# Patient Record
Sex: MALE | Race: WHITE | NOT HISPANIC OR LATINO | Employment: OTHER | URBAN - METROPOLITAN AREA
[De-identification: names, ages, dates, MRNs, and addresses within clinical notes are randomized per-mention and may not be internally consistent; named-entity substitution may affect disease eponyms.]

---

## 2017-04-22 ENCOUNTER — HOSPITAL ENCOUNTER (EMERGENCY)
Facility: HOSPITAL | Age: 32
Discharge: HOME | End: 2017-04-22
Attending: EMERGENCY MEDICINE | Admitting: EMERGENCY MEDICINE

## 2017-04-22 ENCOUNTER — APPOINTMENT (EMERGENCY)
Dept: CT IMAGING | Facility: HOSPITAL | Age: 32
End: 2017-04-22

## 2017-04-22 VITALS
HEART RATE: 109 BPM | DIASTOLIC BLOOD PRESSURE: 99 MMHG | RESPIRATION RATE: 16 BRPM | WEIGHT: 196.65 LBS | OXYGEN SATURATION: 99 % | TEMPERATURE: 98.3 F | SYSTOLIC BLOOD PRESSURE: 194 MMHG

## 2017-04-22 DIAGNOSIS — E10.9 DM TYPE 1 (DIABETES MELLITUS, TYPE 1) (HCC): ICD-10-CM

## 2017-04-22 DIAGNOSIS — R51 HEADACHE(784.0): ICD-10-CM

## 2017-04-22 DIAGNOSIS — I16.1 HYPERTENSIVE EMERGENCY: Primary | ICD-10-CM

## 2017-04-22 LAB
ANION GAP SERPL CALCULATED.3IONS-SCNC: 7 MMOL/L (ref 4–13)
BASOPHILS # BLD AUTO: 0.07 THOUSANDS/ΜL (ref 0–0.1)
BASOPHILS NFR BLD AUTO: 1 % (ref 0–1)
BUN SERPL-MCNC: 25 MG/DL (ref 5–25)
CALCIUM SERPL-MCNC: 8.8 MG/DL (ref 8.3–10.1)
CHLORIDE SERPL-SCNC: 101 MMOL/L (ref 100–108)
CO2 SERPL-SCNC: 29 MMOL/L (ref 21–32)
CREAT SERPL-MCNC: 2.2 MG/DL (ref 0.6–1.3)
EOSINOPHIL # BLD AUTO: 0.36 THOUSAND/ΜL (ref 0–0.61)
EOSINOPHIL NFR BLD AUTO: 3 % (ref 0–6)
ERYTHROCYTE [DISTWIDTH] IN BLOOD BY AUTOMATED COUNT: 14.2 % (ref 11.6–15.1)
GFR SERPL CREATININE-BSD FRML MDRD: 34.6 ML/MIN/1.73SQ M
GLUCOSE SERPL-MCNC: 305 MG/DL (ref 65–140)
GLUCOSE SERPL-MCNC: 356 MG/DL (ref 65–140)
HCT VFR BLD AUTO: 39 % (ref 36.5–49.3)
HGB BLD-MCNC: 13.4 G/DL (ref 12–17)
LYMPHOCYTES # BLD AUTO: 2.74 THOUSANDS/ΜL (ref 0.6–4.47)
LYMPHOCYTES NFR BLD AUTO: 25 % (ref 14–44)
MCH RBC QN AUTO: 31.8 PG (ref 26.8–34.3)
MCHC RBC AUTO-ENTMCNC: 34.4 G/DL (ref 31.4–37.4)
MCV RBC AUTO: 92 FL (ref 82–98)
MONOCYTES # BLD AUTO: 0.5 THOUSAND/ΜL (ref 0.17–1.22)
MONOCYTES NFR BLD AUTO: 5 % (ref 4–12)
NEUTROPHILS # BLD AUTO: 7.44 THOUSANDS/ΜL (ref 1.85–7.62)
NEUTS SEG NFR BLD AUTO: 66 % (ref 43–75)
PLATELET # BLD AUTO: 288 THOUSANDS/UL (ref 149–390)
PMV BLD AUTO: 10.7 FL (ref 8.9–12.7)
POTASSIUM SERPL-SCNC: 4.3 MMOL/L (ref 3.5–5.3)
RBC # BLD AUTO: 4.22 MILLION/UL (ref 3.88–5.62)
SODIUM SERPL-SCNC: 137 MMOL/L (ref 136–145)
WBC # BLD AUTO: 11.11 THOUSAND/UL (ref 4.31–10.16)

## 2017-04-22 PROCEDURE — 36415 COLL VENOUS BLD VENIPUNCTURE: CPT | Performed by: EMERGENCY MEDICINE

## 2017-04-22 PROCEDURE — 80048 BASIC METABOLIC PNL TOTAL CA: CPT | Performed by: EMERGENCY MEDICINE

## 2017-04-22 PROCEDURE — 96360 HYDRATION IV INFUSION INIT: CPT

## 2017-04-22 PROCEDURE — 99284 EMERGENCY DEPT VISIT MOD MDM: CPT

## 2017-04-22 PROCEDURE — 70450 CT HEAD/BRAIN W/O DYE: CPT

## 2017-04-22 PROCEDURE — 93005 ELECTROCARDIOGRAM TRACING: CPT

## 2017-04-22 PROCEDURE — 85025 COMPLETE CBC W/AUTO DIFF WBC: CPT | Performed by: EMERGENCY MEDICINE

## 2017-04-22 PROCEDURE — 82948 REAGENT STRIP/BLOOD GLUCOSE: CPT

## 2017-04-22 RX ORDER — CARVEDILOL 12.5 MG/1
25 TABLET ORAL 2 TIMES DAILY WITH MEALS
Status: DISCONTINUED | OUTPATIENT
Start: 2017-04-23 | End: 2017-04-22

## 2017-04-22 RX ORDER — ASPIRIN 81 MG/1
81 TABLET ORAL DAILY
COMMUNITY
End: 2018-02-05 | Stop reason: HOSPADM

## 2017-04-22 RX ORDER — HYDRALAZINE HYDROCHLORIDE 10 MG/1
50 TABLET, FILM COATED ORAL ONCE
Status: DISCONTINUED | OUTPATIENT
Start: 2017-04-22 | End: 2017-04-22 | Stop reason: SDUPTHER

## 2017-04-22 RX ORDER — CARVEDILOL 12.5 MG/1
25 TABLET ORAL 2 TIMES DAILY WITH MEALS
Status: DISCONTINUED | OUTPATIENT
Start: 2017-04-22 | End: 2017-04-23 | Stop reason: HOSPADM

## 2017-04-22 RX ORDER — CARVEDILOL 25 MG/1
25 TABLET ORAL 2 TIMES DAILY WITH MEALS
Qty: 60 TABLET | Refills: 0 | Status: SHIPPED | OUTPATIENT
Start: 2017-04-22 | End: 2018-02-05 | Stop reason: HOSPADM

## 2017-04-22 RX ORDER — HYDRALAZINE HYDROCHLORIDE 25 MG/1
50 TABLET, FILM COATED ORAL 3 TIMES DAILY
Qty: 90 TABLET | Refills: 0 | Status: SHIPPED | OUTPATIENT
Start: 2017-04-22 | End: 2018-02-05 | Stop reason: HOSPADM

## 2017-04-22 RX ORDER — LISINOPRIL 10 MG/1
10 TABLET ORAL DAILY
Status: DISCONTINUED | OUTPATIENT
Start: 2017-04-22 | End: 2017-04-23 | Stop reason: HOSPADM

## 2017-04-22 RX ORDER — LISINOPRIL 10 MG/1
10 TABLET ORAL DAILY
Qty: 30 TABLET | Refills: 0 | Status: SHIPPED | OUTPATIENT
Start: 2017-04-22 | End: 2018-02-05 | Stop reason: HOSPADM

## 2017-04-22 RX ORDER — LISINOPRIL 10 MG/1
10 TABLET ORAL DAILY
Status: DISCONTINUED | OUTPATIENT
Start: 2017-04-23 | End: 2017-04-22

## 2017-04-22 RX ORDER — HYDRALAZINE HYDROCHLORIDE 25 MG/1
50 TABLET, FILM COATED ORAL ONCE
Status: COMPLETED | OUTPATIENT
Start: 2017-04-22 | End: 2017-04-22

## 2017-04-22 RX ADMIN — LISINOPRIL 10 MG: 10 TABLET ORAL at 22:40

## 2017-04-22 RX ADMIN — HYDRALAZINE HYDROCHLORIDE 50 MG: 25 TABLET, FILM COATED ORAL at 22:40

## 2017-04-22 RX ADMIN — CARVEDILOL 25 MG: 12.5 TABLET, FILM COATED ORAL at 22:41

## 2017-04-22 RX ADMIN — SODIUM CHLORIDE 1000 ML: 0.9 INJECTION, SOLUTION INTRAVENOUS at 22:22

## 2017-04-23 LAB
ATRIAL RATE: 120 BPM
P AXIS: 74 DEGREES
PR INTERVAL: 152 MS
QRS AXIS: 61 DEGREES
QRSD INTERVAL: 56 MS
QT INTERVAL: 296 MS
QTC INTERVAL: 409 MS
T WAVE AXIS: 64 DEGREES
VENTRICULAR RATE: 115 BPM

## 2017-06-19 ENCOUNTER — ALLSCRIPTS OFFICE VISIT (OUTPATIENT)
Dept: OTHER | Facility: OTHER | Age: 32
End: 2017-06-19

## 2017-06-19 DIAGNOSIS — N28.9 DISORDER OF KIDNEY AND URETER: ICD-10-CM

## 2017-06-19 DIAGNOSIS — R80.9 PROTEINURIA: ICD-10-CM

## 2017-06-19 DIAGNOSIS — E10.21 TYPE 1 DIABETES MELLITUS WITH DIABETIC NEPHROPATHY (HCC): ICD-10-CM

## 2017-06-19 DIAGNOSIS — N18.2 CHRONIC KIDNEY DISEASE, STAGE II (MILD): ICD-10-CM

## 2017-06-19 DIAGNOSIS — I10 ESSENTIAL (PRIMARY) HYPERTENSION: ICD-10-CM

## 2017-07-11 ENCOUNTER — APPOINTMENT (OUTPATIENT)
Dept: LAB | Facility: CLINIC | Age: 32
End: 2017-07-11

## 2017-07-11 DIAGNOSIS — N18.2 CHRONIC KIDNEY DISEASE, STAGE II (MILD): ICD-10-CM

## 2017-07-11 DIAGNOSIS — I10 ESSENTIAL (PRIMARY) HYPERTENSION: ICD-10-CM

## 2017-07-11 DIAGNOSIS — E10.21 TYPE 1 DIABETES MELLITUS WITH DIABETIC NEPHROPATHY (HCC): ICD-10-CM

## 2017-07-11 LAB
ALBUMIN SERPL BCP-MCNC: 3 G/DL (ref 3.5–5)
ALP SERPL-CCNC: 88 U/L (ref 46–116)
ALT SERPL W P-5'-P-CCNC: 18 U/L (ref 12–78)
ANION GAP SERPL CALCULATED.3IONS-SCNC: 5 MMOL/L (ref 4–13)
AST SERPL W P-5'-P-CCNC: 14 U/L (ref 5–45)
BILIRUB SERPL-MCNC: 0.41 MG/DL (ref 0.2–1)
BUN SERPL-MCNC: 32 MG/DL (ref 5–25)
CALCIUM SERPL-MCNC: 9 MG/DL (ref 8.3–10.1)
CHLORIDE SERPL-SCNC: 111 MMOL/L (ref 100–108)
CHOLEST SERPL-MCNC: 123 MG/DL (ref 50–200)
CO2 SERPL-SCNC: 25 MMOL/L (ref 21–32)
CREAT SERPL-MCNC: 2.35 MG/DL (ref 0.6–1.3)
CREAT UR-MCNC: 111 MG/DL
EST. AVERAGE GLUCOSE BLD GHB EST-MCNC: 209 MG/DL
GFR SERPL CREATININE-BSD FRML MDRD: 32.1 ML/MIN/1.73SQ M
GLUCOSE P FAST SERPL-MCNC: 160 MG/DL (ref 65–99)
HBA1C MFR BLD: 8.9 % (ref 4.2–6.3)
HDLC SERPL-MCNC: 48 MG/DL (ref 40–60)
LDLC SERPL CALC-MCNC: 62 MG/DL (ref 0–100)
POTASSIUM SERPL-SCNC: 5.7 MMOL/L (ref 3.5–5.3)
PROT SERPL-MCNC: 6.2 G/DL (ref 6.4–8.2)
PROT UR-MCNC: 318 MG/DL
PROT/CREAT UR: 2.86 MG/G{CREAT} (ref 0–0.1)
SODIUM SERPL-SCNC: 141 MMOL/L (ref 136–145)
TRIGL SERPL-MCNC: 64 MG/DL

## 2017-07-11 PROCEDURE — 36415 COLL VENOUS BLD VENIPUNCTURE: CPT

## 2017-07-11 PROCEDURE — 83036 HEMOGLOBIN GLYCOSYLATED A1C: CPT

## 2017-07-11 PROCEDURE — 80061 LIPID PANEL: CPT

## 2017-07-11 PROCEDURE — 82570 ASSAY OF URINE CREATININE: CPT

## 2017-07-11 PROCEDURE — 80053 COMPREHEN METABOLIC PANEL: CPT

## 2017-07-11 PROCEDURE — 84156 ASSAY OF PROTEIN URINE: CPT

## 2017-07-17 ENCOUNTER — ALLSCRIPTS OFFICE VISIT (OUTPATIENT)
Dept: OTHER | Facility: OTHER | Age: 32
End: 2017-07-17

## 2018-01-10 NOTE — MISCELLANEOUS
Provider Comments  Provider Comments:   Patient scheduled for appointment this morning  However, did not attend  Attempted to contact patient to reschedule  Phone continues to ring without option to leave voicemail        Signatures   Electronically signed by : Maik Espino MD; Jun 28 2016  1:18PM EST                       (Acknowledgement)

## 2018-01-11 NOTE — MISCELLANEOUS
Message  I have called the patient multiple times to schedule his follow up for July  I called him on 5/12/16, 5/19/16, 5/27/16, 5/31/16, 6/6/16, 6/9/16, 6/16/16, and 7/6/16  Mailed patient out letter to contact us to schedule  Cathy Gupta      Active Problems    1  Ataxia (781 3) (R27 0)   2  Cerebellar stroke (434 91) (I63 9)   3  Chronic kidney disease (CKD), stage II (mild) (585 2) (N18 2)   4  Diabetes mellitus type 1 (250 01) (E10 9)   5  Esophagitis (530 10) (K20 9)   6  Gastritis (535 50) (K29 70)   7  History of cerebrovascular accident (V12 54) (Z86 73)   8  Hypertension (401 9) (I10)   9  Nausea and vomiting (787 01) (R11 2)   10  Need for immunization against influenza (V04 81) (Z23)   11  Proteinuria (791 0) (R80 9)    Current Meds   1  Aspirin 81 MG TABS; Therapy: (Recorded:91Ejk0613) to Recorded   2  Atorvastatin Calcium 40 MG Oral Tablet; TAKE 1 TABLET DAILY; Therapy: 86KKU6914 to (Evaluate:96Jhi7820)  Requested for: 14FHK7628; Last   Rx:14Irs7881 Ordered   3  BD Pen Needle Ultrafine 29G X 12 7MM Miscellaneous; USE AS DIRECTED WITH   PENS; Therapy: 56IJZ6487 to (Evaluate:04Wmp5241)  Requested for: 79Rud7253; Last   Rx:16Qfo6778 Ordered   4  Carvedilol 25 MG Oral Tablet; TAKE 1 TABLET TWICE DAILY; Therapy: 09KXF8870 to (Yves Ferreira)  Requested for: 49WAE4073; Last   NZ:08IRY5004 Ordered   5  HydrALAZINE HCl - 50 MG Oral Tablet; TAKE 2 TABLET 3 times daily  Requested for:   01HXM1530; Last Rx:07Mar2016 Ordered   6  Lantus 100 UNIT/ML Subcutaneous Solution; inject 18units in AM and 18units PM;   Therapy: 45OHS3352 to (Evaluate:01Jun2016)  Requested for: 44IHH6712; Last   Rx:03Mar2016 Ordered   7  Lisinopril 10 MG Oral Tablet; take 1 tablet by mouth every day; Therapy: 28FIC9658 to (Carlton Houston)  Requested for: 02Dec2015; Last   Rx:02Dec2015 Ordered   8  NIFEdipine ER Osmotic Release 90 MG Oral Tablet Extended Release 24 Hour   (Procardia XL); Take 1 tablet daily;    Therapy: 74XVQ9556 to (Evaluate:76Sfa1304)  Requested for: 61DWO7165; Last   Rx:38Deh2837 Ordered   9  NovoLOG 100 UNIT/ML Subcutaneous Solution; take 8units at breakfast, 12units at   lunch and 14units at dinner; Therapy: 03SKU7647 to (Evaluate:89Wsg4554)  Requested for: 90GBK8869; Last   Rx:03Mar2016 Ordered   10  Omeprazole 20 MG Oral Capsule Delayed Release; TAKE 1 CAPSULE DAILY; Therapy: 23NXF5159 to (Evaluate:36Eay3399)  Requested for: 49SVF8978; Last    Rx:28Jan2016 Ordered    Allergies    1  Sulfa Drugs    Signatures   Electronically signed by :  ALLI Arnold ; Jul 6 2016 11:13AM EST

## 2018-01-13 VITALS
HEART RATE: 79 BPM | HEIGHT: 71 IN | WEIGHT: 209.25 LBS | SYSTOLIC BLOOD PRESSURE: 170 MMHG | BODY MASS INDEX: 29.29 KG/M2 | RESPIRATION RATE: 16 BRPM | DIASTOLIC BLOOD PRESSURE: 88 MMHG | TEMPERATURE: 97.7 F | OXYGEN SATURATION: 98 %

## 2018-01-14 VITALS
RESPIRATION RATE: 16 BRPM | HEART RATE: 77 BPM | BODY MASS INDEX: 28.42 KG/M2 | DIASTOLIC BLOOD PRESSURE: 81 MMHG | HEIGHT: 71 IN | WEIGHT: 203 LBS | OXYGEN SATURATION: 98 % | SYSTOLIC BLOOD PRESSURE: 130 MMHG | TEMPERATURE: 98.1 F

## 2018-01-22 ENCOUNTER — APPOINTMENT (INPATIENT)
Dept: NEUROLOGY | Facility: HOSPITAL | Age: 33
DRG: 045 | End: 2018-01-22
Payer: COMMERCIAL

## 2018-01-22 ENCOUNTER — APPOINTMENT (EMERGENCY)
Dept: CT IMAGING | Facility: HOSPITAL | Age: 33
DRG: 045 | End: 2018-01-22
Payer: COMMERCIAL

## 2018-01-22 ENCOUNTER — HOSPITAL ENCOUNTER (INPATIENT)
Facility: HOSPITAL | Age: 33
LOS: 14 days | Discharge: HOME | DRG: 045 | End: 2018-02-05
Attending: EMERGENCY MEDICINE | Admitting: INTERNAL MEDICINE
Payer: COMMERCIAL

## 2018-01-22 ENCOUNTER — APPOINTMENT (EMERGENCY)
Dept: RADIOLOGY | Facility: HOSPITAL | Age: 33
DRG: 045 | End: 2018-01-22
Payer: COMMERCIAL

## 2018-01-22 ENCOUNTER — TELEPHONE (OUTPATIENT)
Dept: NEUROLOGY | Facility: HOSPITAL | Age: 33
End: 2018-01-22

## 2018-01-22 ENCOUNTER — APPOINTMENT (INPATIENT)
Dept: MRI IMAGING | Facility: HOSPITAL | Age: 33
DRG: 045 | End: 2018-01-22
Payer: COMMERCIAL

## 2018-01-22 DIAGNOSIS — I63.9 CEREBROVASCULAR ACCIDENT (CVA), UNSPECIFIED MECHANISM (HCC): ICD-10-CM

## 2018-01-22 DIAGNOSIS — N18.9 CHRONIC KIDNEY DISEASE: ICD-10-CM

## 2018-01-22 DIAGNOSIS — N17.0 ACUTE RENAL FAILURE WITH ACUTE TUBULAR NECROSIS SUPERIMPOSED ON STAGE 2 CHRONIC KIDNEY DISEASE (HCC): ICD-10-CM

## 2018-01-22 DIAGNOSIS — H46.9 OPTIC NEURITIS DUE TO MULTIPLE SCLEROSIS (HCC): ICD-10-CM

## 2018-01-22 DIAGNOSIS — H51.0 BINOCULAR VISION DISORDER WITH CONJUGATE GAZE PALSY: ICD-10-CM

## 2018-01-22 DIAGNOSIS — Z86.73 HISTORY OF LACUNAR CEREBROVASCULAR ACCIDENT (CVA): ICD-10-CM

## 2018-01-22 DIAGNOSIS — N17.0 ACUTE RENAL FAILURE WITH ACUTE TUBULAR NECROSIS SUPERIMPOSED ON STAGE 3 CHRONIC KIDNEY DISEASE (HCC): ICD-10-CM

## 2018-01-22 DIAGNOSIS — E10.9 DIABETES MELLITUS TYPE 1 (HCC): ICD-10-CM

## 2018-01-22 DIAGNOSIS — N18.30 ACUTE RENAL FAILURE WITH ACUTE TUBULAR NECROSIS SUPERIMPOSED ON STAGE 3 CHRONIC KIDNEY DISEASE (HCC): ICD-10-CM

## 2018-01-22 DIAGNOSIS — E72.11 HYPERHOMOCYSTEINEMIA (HCC): ICD-10-CM

## 2018-01-22 DIAGNOSIS — I16.0 HYPERTENSIVE URGENCY: ICD-10-CM

## 2018-01-22 DIAGNOSIS — N18.2 ACUTE RENAL FAILURE WITH ACUTE TUBULAR NECROSIS SUPERIMPOSED ON STAGE 2 CHRONIC KIDNEY DISEASE (HCC): ICD-10-CM

## 2018-01-22 DIAGNOSIS — F32.A DEPRESSION: ICD-10-CM

## 2018-01-22 DIAGNOSIS — E10.21 TYPE 1 DIABETES MELLITUS WITH DIABETIC NEPHROPATHY, WITH LONG-TERM CURRENT USE OF INSULIN (HCC): ICD-10-CM

## 2018-01-22 DIAGNOSIS — I63.9 CEREBROVASCULAR ACCIDENT (CVA) OF LEFT PONTINE STRUCTURE (HCC): ICD-10-CM

## 2018-01-22 DIAGNOSIS — E55.9 VITAMIN D DEFICIENCY: ICD-10-CM

## 2018-01-22 DIAGNOSIS — G35 OPTIC NEURITIS DUE TO MULTIPLE SCLEROSIS (HCC): ICD-10-CM

## 2018-01-22 DIAGNOSIS — I10 HYPERTENSION: ICD-10-CM

## 2018-01-22 DIAGNOSIS — E83.39 HYPERPHOSPHATEMIA: ICD-10-CM

## 2018-01-22 DIAGNOSIS — R11.0 NAUSEA: ICD-10-CM

## 2018-01-22 DIAGNOSIS — H53.8 BLURRED VISION: ICD-10-CM

## 2018-01-22 DIAGNOSIS — R80.1 PERSISTENT PROTEINURIA: ICD-10-CM

## 2018-01-22 DIAGNOSIS — I63.9 CVA (CEREBRAL VASCULAR ACCIDENT) (HCC): Primary | ICD-10-CM

## 2018-01-22 DIAGNOSIS — R80.9 PROTEINURIA: ICD-10-CM

## 2018-01-22 DIAGNOSIS — H53.30 BINOCULAR VISUAL DISTURBANCE: ICD-10-CM

## 2018-01-22 DIAGNOSIS — R79.89 AZOTEMIA: ICD-10-CM

## 2018-01-22 LAB
ABO GROUP BLD: NORMAL
ALBUMIN SERPL BCP-MCNC: 2.8 G/DL (ref 3.5–5)
ALP SERPL-CCNC: 89 U/L (ref 46–116)
ALT SERPL W P-5'-P-CCNC: 19 U/L (ref 12–78)
ANION GAP SERPL CALCULATED.3IONS-SCNC: 7 MMOL/L (ref 4–13)
ANION GAP SERPL CALCULATED.3IONS-SCNC: 9 MMOL/L (ref 4–13)
APTT PPP: 33 SECONDS (ref 23–35)
AST SERPL W P-5'-P-CCNC: 16 U/L (ref 5–45)
BILIRUB SERPL-MCNC: 0.4 MG/DL (ref 0.2–1)
BLD GP AB SCN SERPL QL: NEGATIVE
BUN SERPL-MCNC: 35 MG/DL (ref 5–25)
BUN SERPL-MCNC: 43 MG/DL (ref 5–25)
CALCIUM SERPL-MCNC: 8.7 MG/DL (ref 8.3–10.1)
CALCIUM SERPL-MCNC: 8.9 MG/DL (ref 8.3–10.1)
CHLORIDE SERPL-SCNC: 105 MMOL/L (ref 100–108)
CHLORIDE SERPL-SCNC: 105 MMOL/L (ref 100–108)
CO2 SERPL-SCNC: 22 MMOL/L (ref 21–32)
CO2 SERPL-SCNC: 24 MMOL/L (ref 21–32)
CREAT SERPL-MCNC: 3.74 MG/DL (ref 0.6–1.3)
CREAT SERPL-MCNC: 4.31 MG/DL (ref 0.6–1.3)
CRP SERPL HS-MCNC: <0.9 MG/L
ERYTHROCYTE [DISTWIDTH] IN BLOOD BY AUTOMATED COUNT: 14 % (ref 11.6–15.1)
GFR SERPL CREATININE-BSD FRML MDRD: 17 ML/MIN/1.73SQ M
GFR SERPL CREATININE-BSD FRML MDRD: 20 ML/MIN/1.73SQ M
GLUCOSE SERPL-MCNC: 103 MG/DL (ref 65–140)
GLUCOSE SERPL-MCNC: 133 MG/DL (ref 65–140)
GLUCOSE SERPL-MCNC: 358 MG/DL (ref 65–140)
GLUCOSE SERPL-MCNC: 360 MG/DL (ref 65–140)
GLUCOSE SERPL-MCNC: 392 MG/DL (ref 65–140)
GLUCOSE SERPL-MCNC: 399 MG/DL (ref 65–140)
GLUCOSE SERPL-MCNC: 465 MG/DL (ref 65–140)
GLUCOSE SERPL-MCNC: 477 MG/DL (ref 65–140)
HCT VFR BLD AUTO: 30.8 % (ref 36.5–49.3)
HCYS SERPL-SCNC: 17.4 UMOL/L (ref 5.3–14.2)
HGB BLD-MCNC: 10.5 G/DL (ref 12–17)
INR PPP: 0.88 (ref 0.86–1.16)
MAGNESIUM SERPL-MCNC: 2 MG/DL (ref 1.6–2.6)
MCH RBC QN AUTO: 30.7 PG (ref 26.8–34.3)
MCHC RBC AUTO-ENTMCNC: 34.1 G/DL (ref 31.4–37.4)
MCV RBC AUTO: 90 FL (ref 82–98)
NT-PROBNP SERPL-MCNC: 2833 PG/ML
PLATELET # BLD AUTO: 275 THOUSANDS/UL (ref 149–390)
PMV BLD AUTO: 10.4 FL (ref 8.9–12.7)
POTASSIUM SERPL-SCNC: 4.8 MMOL/L (ref 3.5–5.3)
POTASSIUM SERPL-SCNC: 5.3 MMOL/L (ref 3.5–5.3)
PROT SERPL-MCNC: 6.2 G/DL (ref 6.4–8.2)
PROTHROMBIN TIME: 12.2 SECONDS (ref 12.1–14.4)
RBC # BLD AUTO: 3.42 MILLION/UL (ref 3.88–5.62)
RH BLD: POSITIVE
SODIUM SERPL-SCNC: 136 MMOL/L (ref 136–145)
SODIUM SERPL-SCNC: 136 MMOL/L (ref 136–145)
SPECIMEN EXPIRATION DATE: NORMAL
TROPONIN I SERPL-MCNC: 0.03 NG/ML
TROPONIN I SERPL-MCNC: 0.04 NG/ML
TROPONIN I SERPL-MCNC: 0.04 NG/ML
TROPONIN I SERPL-MCNC: 0.05 NG/ML
WBC # BLD AUTO: 8.24 THOUSAND/UL (ref 4.31–10.16)

## 2018-01-22 PROCEDURE — 36415 COLL VENOUS BLD VENIPUNCTURE: CPT | Performed by: EMERGENCY MEDICINE

## 2018-01-22 PROCEDURE — 86901 BLOOD TYPING SEROLOGIC RH(D): CPT | Performed by: EMERGENCY MEDICINE

## 2018-01-22 PROCEDURE — 85027 COMPLETE CBC AUTOMATED: CPT | Performed by: EMERGENCY MEDICINE

## 2018-01-22 PROCEDURE — 99285 EMERGENCY DEPT VISIT HI MDM: CPT

## 2018-01-22 PROCEDURE — 83735 ASSAY OF MAGNESIUM: CPT | Performed by: EMERGENCY MEDICINE

## 2018-01-22 PROCEDURE — 80048 BASIC METABOLIC PNL TOTAL CA: CPT | Performed by: PHYSICIAN ASSISTANT

## 2018-01-22 PROCEDURE — A9585 GADOBUTROL INJECTION: HCPCS | Performed by: INTERNAL MEDICINE

## 2018-01-22 PROCEDURE — 80053 COMPREHEN METABOLIC PANEL: CPT | Performed by: EMERGENCY MEDICINE

## 2018-01-22 PROCEDURE — 86141 C-REACTIVE PROTEIN HS: CPT | Performed by: PHYSICIAN ASSISTANT

## 2018-01-22 PROCEDURE — 86900 BLOOD TYPING SEROLOGIC ABO: CPT | Performed by: EMERGENCY MEDICINE

## 2018-01-22 PROCEDURE — 82948 REAGENT STRIP/BLOOD GLUCOSE: CPT

## 2018-01-22 PROCEDURE — 86850 RBC ANTIBODY SCREEN: CPT | Performed by: EMERGENCY MEDICINE

## 2018-01-22 PROCEDURE — 83090 ASSAY OF HOMOCYSTEINE: CPT | Performed by: PHYSICIAN ASSISTANT

## 2018-01-22 PROCEDURE — 84484 ASSAY OF TROPONIN QUANT: CPT | Performed by: EMERGENCY MEDICINE

## 2018-01-22 PROCEDURE — 85610 PROTHROMBIN TIME: CPT | Performed by: EMERGENCY MEDICINE

## 2018-01-22 PROCEDURE — 70498 CT ANGIOGRAPHY NECK: CPT

## 2018-01-22 PROCEDURE — 70450 CT HEAD/BRAIN W/O DYE: CPT

## 2018-01-22 PROCEDURE — 70553 MRI BRAIN STEM W/O & W/DYE: CPT

## 2018-01-22 PROCEDURE — 96374 THER/PROPH/DIAG INJ IV PUSH: CPT

## 2018-01-22 PROCEDURE — 70496 CT ANGIOGRAPHY HEAD: CPT

## 2018-01-22 PROCEDURE — 71045 X-RAY EXAM CHEST 1 VIEW: CPT

## 2018-01-22 PROCEDURE — 83880 ASSAY OF NATRIURETIC PEPTIDE: CPT | Performed by: EMERGENCY MEDICINE

## 2018-01-22 PROCEDURE — 84484 ASSAY OF TROPONIN QUANT: CPT | Performed by: PHYSICIAN ASSISTANT

## 2018-01-22 PROCEDURE — 95816 EEG AWAKE AND DROWSY: CPT

## 2018-01-22 PROCEDURE — 96375 TX/PRO/DX INJ NEW DRUG ADDON: CPT

## 2018-01-22 PROCEDURE — 85730 THROMBOPLASTIN TIME PARTIAL: CPT | Performed by: EMERGENCY MEDICINE

## 2018-01-22 PROCEDURE — 93005 ELECTROCARDIOGRAM TRACING: CPT | Performed by: EMERGENCY MEDICINE

## 2018-01-22 RX ORDER — LABETALOL HYDROCHLORIDE 5 MG/ML
10 INJECTION, SOLUTION INTRAVENOUS ONCE
Status: COMPLETED | OUTPATIENT
Start: 2018-01-22 | End: 2018-01-22

## 2018-01-22 RX ORDER — CARVEDILOL 12.5 MG/1
25 TABLET ORAL 2 TIMES DAILY WITH MEALS
Status: DISCONTINUED | OUTPATIENT
Start: 2018-01-22 | End: 2018-01-22

## 2018-01-22 RX ORDER — NICOTINE 21 MG/24HR
1 PATCH, TRANSDERMAL 24 HOURS TRANSDERMAL DAILY
Status: DISCONTINUED | OUTPATIENT
Start: 2018-01-23 | End: 2018-01-24

## 2018-01-22 RX ORDER — ASPIRIN 81 MG/1
162 TABLET ORAL DAILY
Status: DISCONTINUED | OUTPATIENT
Start: 2018-01-23 | End: 2018-01-23

## 2018-01-22 RX ORDER — ONDANSETRON 2 MG/ML
4 INJECTION INTRAMUSCULAR; INTRAVENOUS EVERY 6 HOURS PRN
Status: DISCONTINUED | OUTPATIENT
Start: 2018-01-22 | End: 2018-02-05 | Stop reason: HOSPADM

## 2018-01-22 RX ORDER — ATORVASTATIN CALCIUM 40 MG/1
40 TABLET, FILM COATED ORAL EVERY EVENING
Status: DISCONTINUED | OUTPATIENT
Start: 2018-01-22 | End: 2018-02-05 | Stop reason: HOSPADM

## 2018-01-22 RX ORDER — HYDRALAZINE HYDROCHLORIDE 20 MG/ML
10 INJECTION INTRAMUSCULAR; INTRAVENOUS EVERY 4 HOURS PRN
Status: DISCONTINUED | OUTPATIENT
Start: 2018-01-22 | End: 2018-01-22

## 2018-01-22 RX ORDER — CARVEDILOL 12.5 MG/1
25 TABLET ORAL 2 TIMES DAILY WITH MEALS
Status: DISCONTINUED | OUTPATIENT
Start: 2018-01-23 | End: 2018-01-22

## 2018-01-22 RX ORDER — ASPIRIN 81 MG/1
162 TABLET, CHEWABLE ORAL ONCE
Status: COMPLETED | OUTPATIENT
Start: 2018-01-22 | End: 2018-01-22

## 2018-01-22 RX ORDER — INSULIN GLARGINE 100 [IU]/ML
24 INJECTION, SOLUTION SUBCUTANEOUS 2 TIMES DAILY
Status: DISCONTINUED | OUTPATIENT
Start: 2018-01-22 | End: 2018-01-22

## 2018-01-22 RX ORDER — SODIUM CHLORIDE 9 MG/ML
100 INJECTION, SOLUTION INTRAVENOUS CONTINUOUS
Status: DISCONTINUED | OUTPATIENT
Start: 2018-01-22 | End: 2018-01-24

## 2018-01-22 RX ORDER — CLOPIDOGREL BISULFATE 75 MG/1
300 TABLET ORAL ONCE
Status: COMPLETED | OUTPATIENT
Start: 2018-01-22 | End: 2018-01-22

## 2018-01-22 RX ORDER — NIFEDIPINE 10 MG/1
10 CAPSULE ORAL EVERY 8 HOURS SCHEDULED
Status: DISCONTINUED | OUTPATIENT
Start: 2018-01-22 | End: 2018-01-22

## 2018-01-22 RX ORDER — MAGNESIUM HYDROXIDE/ALUMINUM HYDROXICE/SIMETHICONE 120; 1200; 1200 MG/30ML; MG/30ML; MG/30ML
30 SUSPENSION ORAL EVERY 6 HOURS PRN
Status: DISCONTINUED | OUTPATIENT
Start: 2018-01-22 | End: 2018-01-24

## 2018-01-22 RX ORDER — INSULIN GLARGINE 100 [IU]/ML
24 INJECTION, SOLUTION SUBCUTANEOUS 2 TIMES DAILY
COMMUNITY
Start: 2015-07-21 | End: 2018-02-05 | Stop reason: HOSPADM

## 2018-01-22 RX ORDER — ONDANSETRON 2 MG/ML
4 INJECTION INTRAMUSCULAR; INTRAVENOUS ONCE
Status: COMPLETED | OUTPATIENT
Start: 2018-01-22 | End: 2018-01-22

## 2018-01-22 RX ORDER — HYDRALAZINE HYDROCHLORIDE 20 MG/ML
5 INJECTION INTRAMUSCULAR; INTRAVENOUS EVERY 4 HOURS PRN
Status: DISCONTINUED | OUTPATIENT
Start: 2018-01-22 | End: 2018-01-22

## 2018-01-22 RX ORDER — NIFEDIPINE 90 MG/1
1 TABLET, EXTENDED RELEASE ORAL DAILY
COMMUNITY
Start: 2016-02-17 | End: 2018-02-05 | Stop reason: HOSPADM

## 2018-01-22 RX ORDER — HYDRALAZINE HYDROCHLORIDE 25 MG/1
50 TABLET, FILM COATED ORAL 3 TIMES DAILY
Status: DISCONTINUED | OUTPATIENT
Start: 2018-01-23 | End: 2018-01-25

## 2018-01-22 RX ORDER — NIFEDIPINE 10 MG/1
30 CAPSULE ORAL EVERY 8 HOURS SCHEDULED
Status: DISCONTINUED | OUTPATIENT
Start: 2018-01-22 | End: 2018-01-22

## 2018-01-22 RX ORDER — HYDRALAZINE HYDROCHLORIDE 20 MG/ML
5 INJECTION INTRAMUSCULAR; INTRAVENOUS EVERY 6 HOURS PRN
Status: DISCONTINUED | OUTPATIENT
Start: 2018-01-22 | End: 2018-01-22

## 2018-01-22 RX ORDER — DOCUSATE SODIUM 100 MG/1
100 CAPSULE, LIQUID FILLED ORAL 2 TIMES DAILY
Status: DISCONTINUED | OUTPATIENT
Start: 2018-01-22 | End: 2018-02-05 | Stop reason: HOSPADM

## 2018-01-22 RX ORDER — ACETAMINOPHEN 325 MG/1
650 TABLET ORAL EVERY 4 HOURS PRN
Status: DISCONTINUED | OUTPATIENT
Start: 2018-01-22 | End: 2018-02-05 | Stop reason: HOSPADM

## 2018-01-22 RX ADMIN — CARVEDILOL 25 MG: 12.5 TABLET, FILM COATED ORAL at 16:22

## 2018-01-22 RX ADMIN — HYDRALAZINE HYDROCHLORIDE 10 MG: 20 INJECTION INTRAMUSCULAR; INTRAVENOUS at 17:10

## 2018-01-22 RX ADMIN — ATORVASTATIN CALCIUM 40 MG: 40 TABLET, FILM COATED ORAL at 17:52

## 2018-01-22 RX ADMIN — GADOBUTROL 9 ML: 604.72 INJECTION INTRAVENOUS at 21:03

## 2018-01-22 RX ADMIN — CLOPIDOGREL BISULFATE 300 MG: 75 TABLET ORAL at 10:45

## 2018-01-22 RX ADMIN — LABETALOL HYDROCHLORIDE 10 MG: 5 INJECTION, SOLUTION INTRAVENOUS at 11:50

## 2018-01-22 RX ADMIN — SODIUM CHLORIDE 2.5 MG/HR: 0.9 INJECTION, SOLUTION INTRAVENOUS at 18:10

## 2018-01-22 RX ADMIN — SODIUM CHLORIDE 100 ML/HR: 0.9 INJECTION, SOLUTION INTRAVENOUS at 21:23

## 2018-01-22 RX ADMIN — IOHEXOL 85 ML: 350 INJECTION, SOLUTION INTRAVENOUS at 10:38

## 2018-01-22 RX ADMIN — SODIUM CHLORIDE 10 UNITS/HR: 9 INJECTION, SOLUTION INTRAVENOUS at 18:15

## 2018-01-22 RX ADMIN — INSULIN HUMAN 5 UNITS: 100 INJECTION, SOLUTION PARENTERAL at 11:50

## 2018-01-22 RX ADMIN — ASPIRIN 81 MG 162 MG: 81 TABLET ORAL at 10:45

## 2018-01-22 RX ADMIN — ONDANSETRON 4 MG: 2 INJECTION INTRAMUSCULAR; INTRAVENOUS at 19:59

## 2018-01-22 RX ADMIN — LABETALOL HYDROCHLORIDE 10 MG: 5 INJECTION, SOLUTION INTRAVENOUS at 10:25

## 2018-01-22 RX ADMIN — ONDANSETRON 4 MG: 2 INJECTION INTRAMUSCULAR; INTRAVENOUS at 10:29

## 2018-01-22 RX ADMIN — HYDRALAZINE HYDROCHLORIDE 5 MG: 20 INJECTION INTRAMUSCULAR; INTRAVENOUS at 14:25

## 2018-01-22 RX ADMIN — INSULIN GLARGINE 24 UNITS: 100 INJECTION, SOLUTION SUBCUTANEOUS at 17:20

## 2018-01-22 RX ADMIN — LABETALOL HYDROCHLORIDE 10 MG: 5 INJECTION, SOLUTION INTRAVENOUS at 16:00

## 2018-01-22 NOTE — ED NOTES
Nicardipine gtt order and pump verified at 2 5mg/hr prior to administration  Verified by myself, Shanon Rodriguez RN, and SHAYLA De La Fuente, RN  01/22/18 2252

## 2018-01-22 NOTE — ED NOTES
Spoke to MRI tech, pt scheduled for MRI brain at [facility]       Mike Landry, MIKAEL  01/22/18 1277

## 2018-01-22 NOTE — ED NOTES
ER RN called ICU to give report  Admitting nurse Brandee to call back for report   ER Charge Brandee notified of pt's scheduled MRI at [facility] \[facility] [place] [facility]\""  01/22/18 8376

## 2018-01-22 NOTE — CONSULTS
Consultation - Neurology   Denece Plants 27 y o  male MRN: [id number]  Unit/Bed#: ED 11 Encounter: [id number]      Physician Requesting Consult: Linard Leventhal, DO  Reason for Consult / Principal Problem:  Stroke alert  Stroke alert called:    Neurology stroke alert response:    Hx and PE limited by:  None  Review of previous medical records was completed  Family, specifically patient's mother, was present at the bedside for history and examination  Neurological Examination:   NIH Stroke Scale Category Score   1 a  Level of consciousness (alert, drowsy, coma)  0   1 b  LOC Question (month, age) Both, 1 correct, neither 0   1 c  LOC commands (eyes; fist) Both, 1 correct, neither 0   2  Best gaze (Tracking) Normal, Partial, gaze paresis 2  3  Visual field cuts- None, Partial ernestine, Complete ernestine, B Blind 0  4  Facial palsy (teeth, brows and lids shut) Normal, Minor, Partial, Complete 0   5 a  Motor Arm Left  (Elevate and score 10 sec drift) None, Drift (<10 sec), Some effort (falls < 10 sec),  No effort, No movement, Unable to score 0   5 b  Motor Arm Right (Elevate and score 10 sec drift) None, Drift (<10 sec), Some effort (falls < 10 sec),  No effort, No movement, Unable to score 0   6 a  Motor Leg Left (Elevate 30 hold 5 count) None, Drift (<5 sec), Some effort (falls < 5 sec),  No effort, No movement, Unable to score 0   6 b  Motor Leg Right (Elevate 30 hold 5 count) None, Drift (<5 sec), Some effort (falls < 5 sec),  No effort, No movement, Unable to score 0   7  Limb Ataxia (FNF HTS) Absent, Present one limb, Present two limbs, 0  8  Sensory (PP face, arm, trunk, leg) Normal, Mild/mod, Severe/total 0   9  Best Language (items, picture, reads) Good, Mild/mod, Severe, Mute 0   10  Dysarthria (speech clarity) Normal, Mild/mod, Severe dysarthria 0   11  Extinction/neglect None, Partial neglect, Complete 0    Total NIHSS 2   Mental Status:  The patient was awake, alert, attentive, oriented to person, place, and time  Recent and remote memory intact to conversation with no evidence of language dysfunction  Satisfactory fund of knowledge  Normal attention span and concentration  Able to name, repeat, describe a complex scene  Assessment/Plan:  1  Possibly new small vessel stroke that presented with waking this morning  The patient has had a previous infarct   2  Prior July 2015 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 and also heterozygote for prothrombin gene mutation  He was referred to hematology oncology it appears he has never seen them  That time he was also advised to cease smoking a pack a day he has resumed smoking at half a pack a day  Aspirin statin and blood pressure control or advocated at that time  Since then he reports generally good compliance although not daily with his blood pressure medications  3  Type 1 diabetes mellitus- maintained on insulin with what he reports be generally good control  4  Hypertension- he remains on 3 agents carvedilol a press a link lisinopril  5  Tobacco use- he remains at a half a pack a smoker day  Plan:  He will be admitted he needs a full stroke workup above  Whether not his MRI will indicate that he has had a new infarct or could this be a recrudescence episode of his previous infarct remains to be determined  He should have a echo and he may even need a SANJEEV  His CTA is clean at this point will continue on aspirin high until we have further information to determine if this was a hypercoagulable event or possibly a small vessel infarct possibly related to a poor blood pressure control as was noted when he was admitted  HPI: Huey Quispe is a right handed  27 y o  male who  has seen Dr Esme Rosales of our stroke team as an outpatient in the office in September of 2015 after the patient experienced a stroke while he was visiting friends    Had a small cerebellar infarct in July of 2015 with known stroke risk factors of smoking and an undiagnosed hypertension with systolics in the 555 and diabetes as he is a type 1 diabetic since the age of 1  When he saw Dr Derrick Rizzo in the office he was encouraged to maintain his blood pressure medications as well as to maintain his tobacco abstinence and maintain on his aspirin  We had also asked him to have a hematology oncology consult regarding the genetic variation noted above and whether not this may portend a hypercoagulable state  Apparently that did not occur  Patient and his mother reports that in the meantime has gotten back to life generally pretty well  He is driving again he reports he generally has much better balance and he had when he 1st recovered  It is in the context of this that the patient reports he has been taking his medications generally most of the time he remembers  He awoke this morning with new complaints of dizziness and nausea miss  He reports his vision was also a blurry this morning he did not endorse any particular headache  He presented to the emergency room his blood pressure is noted to be in the area of 230  Of visual examination by   to her arc triggered a stroke alert and neurology responded Um immediately and we agreed that the patient in the room with his mother before he was taken to CT scan  His stroke alert as noted above was very low at only 2 as a result of his gaze palsy  ROS: 12 system cued query:  Positive for the complaints of an inability to walk and gaze to the left  He continues to report being nauseous he was not gaited any more his parents assisted him to ambulate to the car  Historical Information     Past Medical History:   Diagnosis Date    Diabetes type 1, controlled ([facility] [address] )     Hypertension     Stroke, cerebellar 2015 2013     History reviewed  No pertinent surgical history      Social History :  He is single he is living at home with his parents  Half a pack a day smoker occasional alcohol  Family History: here is no significant family history to suggest a bleeding or clotting disorder although his great-grandmother experienced DVT in her 62s and his great-grandfather a stroke in his 62s  His mother and father are alive and well  Allergies   Allergen Reactions    Sulfa Antibiotics Rash     Meds:all current active meds have been reviewed    Scheduled Meds:  PRN Meds:       Physical Exam:   Objective   Vitals:Blood pressure (!) 171/94, pulse 76, temperature 98 °F (36 7 °C), temperature source Oral, resp  rate 18, weight 93 5 kg (206 lb 2 1 oz), SpO2 100 %  ,Body mass index is 29 16 kg/m²  Patient was examined in emergency department bed his mother is at the bedside  General: alert, appears stated age and cooperative  Head: Normocephalic, without obvious abnormality, atraumatic  Neck: no carotid bruit,   Lungs: clear to auscultation ant  bilaterally  Heart: regular rate and rhythm, S1, S2 normal, no murmur appreciated,   Abdomen: soft, +BS    Extremities: atraumatic, no cyanosis or edema    Neurologic:   Mental status: Alert, completely oriented, spontaneous conversation and thought content appropriate without dysarthria or aphasia  CN Exam: TIANNA, EOM's were noted have a slight upbeat nystagmus on the left more so than the right  He was unable to scan or look to the left side bilaterally  VF limited to right side clearly and independently because of his gaze palsy  , Gaze conjugate on the right side  He lost conjugation with a moved to the left side  Otherwise no sensory or motor lateralizations (No PP on face), Hearing I B, CNIX-XII I B  Motor: full power, age appropriate x 4 limbs  Sensory: intact  X 4 limbs, 4 mod inc lt, temp, vib and prop, (not PP tested)  Cerebellar: no past pointing or drift from high Othella Sell is a Romberg position, no gross ataxia w maneuvers, Fine motor & finger taps, age appropriate, WNL    DTR's: Age appropriate, WNL; Plantars: downgoing B  Gait:  He was not gaited at this time he reports he occasionally has loss of balance  Lab Results:   I have personally reviewed pertinent reports  , CBC:   Results from last 7 days  Lab Units 01/22/18  1015   WBC Thousand/uL 8 24   RBC Million/uL 3 42*   HEMOGLOBIN g/dL 10 5*   HEMATOCRIT % 30 8*   MCV fL 90   PLATELETS Thousands/uL 275   , BMP/CMP:   Results from last 7 days  Lab Units 01/22/18  1015   SODIUM mmol/L 136   POTASSIUM mmol/L 5 3   CHLORIDE mmol/L 105   CO2 mmol/L 22   ANION GAP mmol/L 9   BUN mg/dL 35*   CREATININE mg/dL 3 74*   GLUCOSE RANDOM mg/dL 360*   CALCIUM mg/dL 8 9   AST U/L 16   ALT U/L 19   ALK PHOS U/L 89   TOTAL PROTEIN g/dL 6 2*   ALBUMIN g/dL 2 8*   BILIRUBIN TOTAL mg/dL 0 40   EGFR ml/min/1 73sq m 20   , Vitamin B12:   , HgBA1C:   , TSH:   , Coagulation:   Results from last 7 days  Lab Units 01/22/18  1015   INR  0 88    A1c and lipid panel are pending  Imaging Studies: I have personally reviewed pertinent films in PACS and CT scan shows not necessarily and old cerebellar infarct as reported I do see a previous the limb epic lacunar infarct on his CT scan on my review  Radiology notes that as well as somewhat precocious volume loss given his young age  CTA was clean with no large vessel flow restrictive disease of either the head or neck  EEG, Echo, Pathology, and Other Studies: We questioned when we saw him in the office if he ever had an echocardiogram in 2432 he certainly needs 1 now he may need a SANJEEV  Counseling / Coordination of Care  Total Critical Care time spent 50 minutes excluding procedures, teaching and family updates  Dictation voice to text software has been used in the creation of this document  Please consider this in light of any contextual or grammatical errors

## 2018-01-22 NOTE — ED NOTES
ICU admitting nurse Yris received report of pt  Yris to monitor pt in MRI   ER Charge updated     Jacy Savage RN  01/22/18 8919

## 2018-01-22 NOTE — ED NOTES
Neuro NP Augusto Coronado came to nurses station and asked if we could find out if Pt's current room assignment ([phone]) would put his left or right side facing the door  Because of his left side visual deficit she would like his right side to face the door  Upon calling 3rd floor was told that room would have his left side facing the door   Unit clerk will have the charge nurse call me to discuss changing his room to accommodate [facility] CTR request      Hailey Orozco RN  01/22/18 6503

## 2018-01-22 NOTE — ED NOTES
Lunch tray provided  Pt consuming food with negative complications  Assessment unchanged  NSR on monitor   Niko Wisdom RN  01/22/18 9304

## 2018-01-22 NOTE — ED PROVIDER NOTES
History  Chief Complaint   Patient presents with    Dizziness     c/o dizziness and nausea since he woke up also states his vision is blurry  stroke 2 5 years ago     26-year-old male comes in complaining of dizziness blurry vision nausea and vomiting  Patient has a history of diabetes type 1 chronic kidney disease and had a stroke in 2015  Patient states these are the same symptoms he had when he had his 1st stroke  Patient has a noticeable in were turning of his left eye  History provided by:  Patient and parent  History limited by:  Age  Dizziness   Quality:  Lightheadedness  Severity:  Severe  Onset quality:  Sudden  Duration:  2 hours  Timing:  Constant  Progression:  Worsening  Chronicity:  Recurrent  Context: inactivity    Ineffective treatments:  None tried  Associated symptoms: nausea and vision changes    Associated symptoms: no blood in stool, no chest pain and no headaches    Nausea:     Severity:  Severe    Onset quality:  Sudden    Duration:  2 hours    Timing:  Constant    Progression:  Worsening  Risk factors: heart disease and hx of stroke    Risk factors: no anemia and no Meniere's disease        Prior to Admission Medications   Prescriptions Last Dose Informant Patient Reported?  Taking?   aspirin (ECOTRIN LOW STRENGTH) 81 mg EC tablet   Yes No   Sig: Take 81 mg by mouth daily   carvedilol (COREG) 25 mg tablet   No No   Sig: Take 1 tablet by mouth 2 (two) times a day with meals for 30 days   hydrALAZINE (APRESOLINE) 25 mg tablet   No No   Sig: Take 2 tablets by mouth 3 (three) times a day for 30 days   insulin aspart (NOVOLOG) 100 units/mL injection   Yes No   Sig: Inject under the skin 3 (three) times a day before meals   lisinopril (ZESTRIL) 10 mg tablet   No No   Sig: Take 1 tablet by mouth daily for 30 days      Facility-Administered Medications: None       Past Medical History:   Diagnosis Date    Diabetes type 1, controlled ([facility] )     Hypertension     Stroke ([facility] ) 2013 History reviewed  No pertinent surgical history  History reviewed  No pertinent family history  I have reviewed and agree with the history as documented  Social History   Substance Use Topics    Smoking status: Current Every Day Smoker     Packs/day: 0 50     Types: Cigarettes    Smokeless tobacco: Never Used    Alcohol use Yes      Comment: socially        Review of Systems   Constitutional: Negative for activity change and appetite change  HENT: Negative for congestion and facial swelling  Eyes: Negative for discharge and redness  Respiratory: Negative for cough and wheezing  Cardiovascular: Negative for chest pain and leg swelling  Gastrointestinal: Positive for nausea  Negative for abdominal distention, abdominal pain and blood in stool  Endocrine: Negative for cold intolerance and polydipsia  Genitourinary: Negative for difficulty urinating and hematuria  Musculoskeletal: Negative for arthralgias and gait problem  Skin: Negative for color change and rash  Allergic/Immunologic: Negative for food allergies and immunocompromised state  Neurological: Positive for dizziness  Negative for seizures and headaches  Hematological: Negative for adenopathy  Does not bruise/bleed easily  Psychiatric/Behavioral: Negative for agitation, confusion and decreased concentration  All other systems reviewed and are negative        Physical Exam  ED Triage Vitals   Temperature Pulse Respirations Blood Pressure SpO2   01/22/18 1012 01/22/18 1012 01/22/18 1012 01/22/18 1013 01/22/18 1012   98 °F (36 7 °C) 92 18 (!) 212/107 100 %      Temp Source Heart Rate Source Patient Position - Orthostatic VS BP Location FiO2 (%)   01/22/18 1012 01/22/18 1012 01/22/18 1012 01/22/18 1012 --   Oral Monitor Sitting Left arm       Pain Score       01/22/18 1030       No Pain           Orthostatic Vital Signs  Vitals:    01/22/18 1050 01/22/18 1100 01/22/18 1115 01/22/18 1130   BP:  (!) 183/103 (!) 186/107 (!) 186/106   Pulse: 78 76  78   Patient Position - Orthostatic VS:  Lying  Lying       Physical Exam   Constitutional: He is oriented to person, place, and time  He appears well-developed and well-nourished  Non-toxic appearance  HENT:   Head: Normocephalic and atraumatic  Right Ear: Tympanic membrane normal    Left Ear: Tympanic membrane normal    Nose: Nose normal    Mouth/Throat: Oropharynx is clear and moist    Eyes: Conjunctivae, EOM and lids are normal  Pupils are equal, round, and reactive to light  Neck: Trachea normal and normal range of motion  Neck supple  No JVD present  Carotid bruit is not present  Cardiovascular: Normal rate, regular rhythm, normal heart sounds and intact distal pulses  No extrasystoles are present  Pulmonary/Chest: Effort normal  He has no decreased breath sounds  He has no wheezes  He has no rhonchi  He has no rales  He exhibits no tenderness and no deformity  Abdominal: Soft  Bowel sounds are normal  There is no tenderness  There is no rebound, no guarding and no CVA tenderness  Musculoskeletal:        Right shoulder: He exhibits normal range of motion, no tenderness, no swelling and no deformity  Cervical back: Normal  He exhibits normal range of motion, no tenderness, no bony tenderness and no deformity  Lymphadenopathy:     He has no cervical adenopathy  He has no axillary adenopathy  Neurological: He is alert and oriented to person, place, and time  He has normal strength and normal reflexes  A cranial nerve deficit is present  No sensory deficit  He displays a negative Romberg sign  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   Skin: Skin is warm and dry  Psychiatric: He has a normal mood and affect  His speech is normal and behavior is normal  Judgment and thought content normal  Cognition and memory are normal    Nursing note and vitals reviewed        ED Medications  Medications   labetalol (NORMODYNE) injection 10 mg (not administered)   insulin regular (HumuLIN R,NovoLIN R) injection 5 Units (not administered)   labetalol (NORMODYNE) injection 10 mg (10 mg Intravenous Given 1/22/18 1025)   ondansetron (ZOFRAN) injection 4 mg (4 mg Intravenous Given 1/22/18 1029)   iohexol (OMNIPAQUE) 350 MG/ML injection (SINGLE-DOSE) 85 mL (85 mL Intravenous Given 1/22/18 1038)   clopidogrel (PLAVIX) tablet 300 mg (300 mg Oral Given 1/22/18 1045)   aspirin chewable tablet 162 mg (162 mg Oral Given 1/22/18 1045)       Diagnostic Studies  Results Reviewed     Procedure Component Value Units Date/Time    Magnesium [[id number]]  (Normal) Collected:  01/22/18 1015    Lab Status:  Final result Specimen:  Blood from Arm, Right Updated:  01/22/18 1101     Magnesium 2 0 mg/dL     B-type natriuretic peptide [[id number]]  (Abnormal) Collected:  01/22/18 1015    Lab Status:  Final result Specimen:  Blood from Arm, Right Updated:  01/22/18 1101     NT-proBNP 2,833 (H) pg/mL     Troponin I [[id number]]  (Normal) Collected:  01/22/18 1015    Lab Status:  Final result Specimen:  Blood from Arm, Right Updated:  01/22/18 1101     Troponin I 0 04 ng/mL     Narrative:         [employer] Chemistry analyzer 99% cutoff is > 0 04 ng/mL in network labs    o cTnI 99% cutoff is useful only when applied to patients in the clinical setting of myocardial ischemia  o cTnI 99% cutoff should be interpreted in the context of clinical history, ECG findings and possibly cardiac imaging to establish correct diagnosis  o cTnI 99% cutoff may be suggestive but clearly not indicative of a coronary event without the clinical setting of myocardial ischemia      Comprehensive metabolic panel [[id number]]  (Abnormal) Collected:  01/22/18 1015    Lab Status:  Final result Specimen:  Blood from Arm, Right Updated:  01/22/18 1101     Sodium 136 mmol/L      Potassium 5 3 mmol/L      Chloride 105 mmol/L      CO2 22 mmol/L      Anion Gap 9 mmol/L      BUN 35 (H) mg/dL      Creatinine 3 74 (H) mg/dL Glucose 360 (H) mg/dL      Calcium 8 9 mg/dL      AST 16 U/L      ALT 19 U/L      Alkaline Phosphatase 89 U/L      Total Protein 6 2 (L) g/dL      Albumin 2 8 (L) g/dL      Total Bilirubin 0 40 mg/dL      eGFR 20 ml/min/1 73sq m     Narrative:         National Kidney Disease Education Program recommendations are as follows:  GFR calculation is accurate only with a steady state creatinine  Chronic Kidney disease less than 60 ml/min/1 73 sq  meters  Kidney failure less than 15 ml/min/1 73 sq  meters  Protime-INR [[id number]]  (Normal) Collected:  01/22/18 1015    Lab Status:  Final result Specimen:  Blood from Arm, Right Updated:  01/22/18 1040     Protime 12 2 seconds      INR 0 88    APTT [[id number]]  (Normal) Collected:  01/22/18 1015    Lab Status:  Final result Specimen:  Blood from Arm, Right Updated:  01/22/18 1040     PTT 33 seconds     Narrative: Therapeutic Heparin Range = 60-90 seconds    CBC [[id number]]  (Abnormal) Collected:  01/22/18 1015    Lab Status:  Final result Specimen:  Blood from Arm, Right Updated:  01/22/18 1031     WBC 8 24 Thousand/uL      RBC 3 42 (L) Million/uL      Hemoglobin 10 5 (L) g/dL      Hematocrit 30 8 (L) %      MCV 90 fL      MCH 30 7 pg      MCHC 34 1 g/dL      RDW 14 0 %      Platelets 690 Thousands/uL      MPV 10 4 fL     Fingerstick Glucose (POCT) [[id number]]  (Abnormal) Collected:  01/22/18 1029    Lab Status:  Final result Updated:  01/22/18 1030     POC Glucose 358 (H) mg/dl                  CTA head and neck with and without contrast   Final Result by Ernie Spear MD (01/22 1107)      Normal CTA of the head or neck  No large vessel flow restrictive disease  * I personally telephoned this result to [facility] on 1/22/2018 1048 hours  Workstation performed: [id number]         CT stroke alert brain   Final Result by Ernie Spear MD (01/22 1100)      No intracranial hemorrhage or mass    Progression of what most probably represents precocious chronic small vessel disease since prior study 9 months earlier  Based upon clinical presentation patient may have brainstem infarct which is not evident on    this exam but could be documented with MR        Findings were directly discussed with Karishma Sutton on 1/22/2018 10:48 AM          Workstation performed: [id number]         XR stroke alert portable chest    (Results Pending)              Procedures  ECG 12 Lead Documentation  Date/Time: 1/22/2018 10:25 AM  Performed by: Oscar Storm by: Uzma Ashby Time  Performed by: Sharon Jones  Authorized by: Sharon Jones     Critical care provider statement:     Critical care time (minutes):  32    Critical care was necessary to treat or prevent imminent or life-threatening deterioration of the following conditions:  CNS failure or compromise    Critical care was time spent personally by me on the following activities:  Obtaining history from patient or surrogate, development of treatment plan with patient or surrogate, discussions with consultants, discussions with primary provider, evaluation of patient's response to treatment, examination of patient, interpretation of cardiac output measurements, ordering and performing treatments and interventions, ordering and review of laboratory studies, ordering and review of radiographic studies, re-evaluation of patient's condition and review of old charts           Phone Contacts  ED Phone Contact    ED Course  ED Course                                MDM  Number of Diagnoses or Management Options  Blurred vision: new and requires workup  Chronic kidney disease: established and worsening  CVA (cerebral vascular accident) [facility]): new and requires workup  Diabetes mellitus type 1 ([facility] ): established and worsening  Hypertension: established and worsening     Amount and/or Complexity of Data Reviewed  Clinical lab tests: ordered and reviewed  Tests in the radiology section of CPT®: ordered and reviewed  Tests in the medicine section of CPT®: ordered and reviewed  Decide to obtain previous medical records or to obtain history from someone other than the patient: yes (Patient has been seen by our neurologist in the past )  Obtain history from someone other than the patient: yes (Patient's mother is also present in able to give some history as well )  Review and summarize past medical records: yes (Patient did have a stroke in 2015 patient has been noncompliant due to a variety of reasons usually financial )  Discuss the patient with other providers: yes (Spoke with Dr Lynn Martinez from Neurology came down and saw and examined the patient he suggests Plavix 300 mg in getting to ASA of 325 as well  Will admit to slim get inpatient MRI and further evaluation )  Independent visualization of images, tracings, or specimens: yes    Patient Progress  Patient progress: stable    CritCare Time    Disposition  Final diagnoses:   CVA (cerebral vascular accident) ([facility] )   Blurred vision   Chronic kidney disease   Diabetes mellitus type 1 ([facility] )   Hypertension     Time reflects when diagnosis was documented in both MDM as applicable and the Disposition within this note     Time User Action Codes Description Comment    1/22/2018 10:43 AM Epimenio Kraft K Add [I63 9] CVA (cerebral vascular accident) ([facility] )     1/22/2018 11:37 AM Epimenchrista Kraft K Add [H53 8] Blurred vision     1/22/2018 11:37 AM Epimenio Kraft K Add [N18 9] Chronic kidney disease     1/22/2018 11:37 AM Adriel Min Add [E10 9] Diabetes mellitus type 1 ([facility] )     1/22/2018 11:37 AM Jamie Min Add [I10] Hypertension       ED Disposition     ED Disposition Condition Comment    Admit  Case was discussed with Dr Edgar Sow and the patient's admission status was agreed to be Admission Status: inpatient status to the service of Dr Ele Choi           Follow-up Information    None       Patient's Medications   Discharge Prescriptions No medications on file     No discharge procedures on file      ED Provider  Electronically Signed by           Gabby Mcnulty DO  01/22/18 1143

## 2018-01-22 NOTE — H&P
Addendum:  After multiple attempts to decrease BP ease labetalol 40 mg IV, hydralazine 15 mg IV, nifedipine 30 mg p o , carvedilol 25 mg p o  patient blood pressures are refractory 214/104  At this time will initiate Cardene drip to titrate  In addition patient BGs continue to rise 477 was given Lantus 24 units given patient is actively vomiting we will initiate insulin drip  Upgrade level of care to step-down level 2  Critical care advance practitioner aware  H&P- Ya Living 1985, 27 y o  male MRN: [id number]    Unit/Bed#: ED 11 Encounter: [id number]    Primary Care Provider: Vy David DO   Date and time admitted to hospital: 1/22/2018 10:13 AM        * Conjugate gaze palsy   Assessment & Plan    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 risk factors include hypertensive urgency fairly uncontrolled type 1 diabetes mellitus and significant smoker half to 1/2 packs a day  · Neurology consulted stroke alert upon arrival   Stat head CT negative for hemorrhage or mass; chronic small vessel brain stem infarct, chest x-ray negative for acute pulmonary disease, CTA head and neck was negative for hemorrhage, ischemia or stenosis  Plan for brain MRI, EEG, echocardiogram   Given aspirin 162 mg p o  and Plavix 300 mg p o  in the ED  · Continue stroke pathway with aspirin 162 mg p o  daily, atorvastatin 40 mg p o  Daily  · Neurochecks, telemetry, EKG p r n    · Obtain TSH, homocystine level, hemoglobin A1c  · PT/OT/speech therapy        Hypertensive urgency   Assessment & Plan    · Present upon admission elevated 212/107 possibly secondary to noncompliance with home medication in the setting of suspected TIA/CVA  · Decrease blood pressure no greater than 20%  within 24 hours ~170/95  · Hydralazine p r n  for SBP greater than 180 hold if less than 175  · Encourage compliance  Continue home medication in the a m  carvedilol 25 mg b i d , nifedipine 90 mg q day, hydralazine 50 mg t i d   · Hold home medication lisinopril due to acute kidney injury         Acute renal failure superimposed on stage 2 chronic kidney disease ([facility])   Assessment & Plan    · Present upon admission BUN/creatinine elevated 35/3 74 baseline 1 9-2 2 likely secondary to uncontrolled type 1 diabetes mellitus  · Continue IVF normal saline at 100 mL/HR, monitor ins/outs, daily weights  · Obtain UA with microscopy and urine creatinine  · If BUN/creatinine continues to rise consider renal ultrasound for intrinsic causes/nephrology consultation  · Follows outpatient nephrologist Dr Mariaelena Zhao will need close follow-up outpatient  Type 1 diabetes mellitus with diabetic nephropathy, with long-term current use of insulin ([facility])   Assessment & Plan    · Elevated  Hemoglobin A1c 8 9 July 2017 per patient BGs 400s in the a m  likely candidate for insulin pump however patient has resisted  · Goal BG between 140-180 repeat hemoglobin A1c  · Continue home medication Lantus 24 units b i d , add lispro 7 units t i d  with meals, insulin sliding scale, Accu-Cheks q i d , hypoglycemic protocol          VTE Prophylaxis: Will ambulate patient  / sequential compression device   Code Status:  Full code-discussed the patient at the bedside  POLST: POLST information provided but not completed  Discussion with family:  Discussed with patient and patient's wife via phone in regards to appropriate management of hypertensive urgency and type 1 diabetes mellitus    Anticipated Length of Stay:  Patient will be admitted on an Inpatient basis with an anticipated length of stay of  at least 2 midnights     Justification for Hospital Stay:  Left conjugate gaze suspected TIA/CVA    Total Time for Visit, including Counseling / Coordination of Care: 1 hour  Greater than 50% of this total time spent on direct patient counseling and coordination of care  Chief Complaint:   "Woke up this morning and had this blurred vision and could not get up and walk and persisted "    History of Present Illness:    Jun Rivas is a 27 y o  male history of left lacunar infarct 07/2015 hypertension, hyperlipidemia, type 1 diabetes mellitus, chronic kidney disease stage 2 who presents with blurred vision and left lateral gaze palsy earlier prior to admission  Patient stated a sudden onset of blurred vision at the and occasional diplopia around 830 this morning  Patient attempted to get out of bed and had difficulty ambulating  Patient stated persistent double vision and reported to his mother who brought patient to the emergency department for further evaluation  Patient also reports persistent nausea without episodes of vomiting headache abdominal pain, diarrhea, constipation, fevers or chills  Patient admitted to nasal congestion with maxillary sinus pressure roughly 2 weeks ago which had resolved without over counter medication or prescriptions  Patient admits to smoking half to 1 pack per day and 2-3 cups of coffee a day denies excessive EtOH consumption socially drinks  Of note, patient route was hospitalized for lacunar infarct July 2015 likely due to C677T MTHR and heterozygous prothrombin gene mutation with an elevated homocystine level  Patient was instructed to follow up with Hematology in [place] has not done so  Patient recently seen at outpatient PCP who was increased Lantus to 24 units b i d  due to uncontrolled BGs  Patient also was seen by Dr Nannette Mayo outpatient nephrology on 02/2016 due to chronic kidney disease stage to without adjustments to home medication      In the ED, patient was found to be hypertensive at 212/107, stroke alert was called and a stat chest x-ray revealed no acute pulmonary disease Head CT revealed no hemorrhage or mass however possible chronic small vessel disease suspected brainstem infarct  Neurology suggested CTA head and neck which revealed no acute hemorrhage, infarct, stenosis and initiated  mg X 1 dose and Plavix 300 mg X 1 dose  Patient was also administered labetalol 20 mg IV for resolving blood pressure 170/94  Patient labs were significant for an elevation in BUN/creatinine at 35/3 74 and BG of 360 initial troponin slightly elevated at 0 04 and anemia at 10 5/30 8  Patient was then admitted to the medical-surgical floor for further evaluation of suspected TIA/CVA  Review of Systems:    Review of Systems   Constitutional: Positive for activity change  Negative for appetite change, chills, diaphoresis, fatigue, fever and unexpected weight change  HENT: Negative for congestion, dental problem, ear pain, facial swelling, sinus pain, sneezing and sore throat  Eyes: Positive for photophobia and visual disturbance  Negative for pain, discharge and itching  Respiratory: Negative for apnea, cough, choking, chest tightness, shortness of breath, wheezing and stridor  Cardiovascular: Negative for chest pain, palpitations and leg swelling  Gastrointestinal: Negative for abdominal distention, constipation, diarrhea, nausea and rectal pain  Endocrine: Negative for polyphagia and polyuria  Genitourinary: Negative for difficulty urinating, discharge, dysuria, genital sores, scrotal swelling and urgency  Musculoskeletal: Positive for gait problem  Negative for arthralgias  Skin: Negative for color change, pallor and rash  Neurological: Positive for weakness  Negative for tremors, syncope, speech difficulty, light-headedness and numbness  Psychiatric/Behavioral: Negative for agitation, decreased concentration and hallucinations         Past Medical and Surgical History:     Past Medical History:   Diagnosis Date    Cerebellar stroke side undetermined 2015 2013  Diabetes type 1, controlled ([facility] [address] )     Hypertension        History reviewed  No pertinent surgical history  Meds/Allergies:    Prior to Admission medications    Medication Sig Start Date End Date Taking? Authorizing Provider   insulin glargine (LANTUS) 100 units/mL subcutaneous injection Inject 24 Units under the skin 2 (two) times a day 7/21/15  Yes Historical Provider, MD   NIFEdipine (PROCARDIA XL) 90 mg 24 hr tablet Take 1 tablet by mouth daily 2/17/16  Yes Historical Provider, MD   aspirin (ECOTRIN LOW STRENGTH) 81 mg EC tablet Take 81 mg by mouth daily    Historical Provider, MD   carvedilol (COREG) 25 mg tablet Take 1 tablet by mouth 2 (two) times a day with meals for 30 days 4/22/17 5/22/17  Malena Cavazos,    hydrALAZINE (APRESOLINE) 25 mg tablet Take 2 tablets by mouth 3 (three) times a day for 30 days 4/22/17 5/22/17  Malena Cavazos,    insulin aspart (NOVOLOG) 100 units/mL injection Inject under the skin 3 (three) times a day before meals    Historical Provider, MD   lisinopril (ZESTRIL) 10 mg tablet Take 1 tablet by mouth daily for 30 days 4/22/17 5/22/17  Armida Gerard, DO     I have reviewed home medications with patient personally  Allergies: Allergies   Allergen Reactions    Sulfa Antibiotics Rash       Social History:     Marital Status: Single   Occupation:  [employer]   Patient Pre-hospital Living Situation:  Lives with mother at home  Patient Pre-hospital Level of Mobility:  Ambulates without cane rolling walker assistance  Patient Pre-hospital Diet Restrictions:  None  Substance Use History:   History   Alcohol Use    Yes     Comment: socially     History   Smoking Status    Current Every Day Smoker    Packs/day: 0 50    Types: Cigarettes   Smokeless Tobacco    Never Used     History   Drug Use    Frequency: 3 0 times per week    Types: Marijuana     Comment: weekly       Family History:    History reviewed  No pertinent family history      Physical Exam:     Vitals: Blood Pressure: (!) 200/101 (01/22/18 1445)  Pulse: 84 (01/22/18 1445)  Temperature: 98 °F (36 7 °C) (01/22/18 1012)  Temp Source: Oral (01/22/18 1012)  Respirations: 18 (01/22/18 1445)  Weight - Scale: 93 5 kg (206 lb 2 1 oz) (01/22/18 1015)  SpO2: 100 % (01/22/18 1445)    Physical Exam   Constitutional: He is oriented to person, place, and time  He appears well-developed  No distress  Sitting upright in bed slightly anxious alert and oriented to person place and time   HENT:   Head: Normocephalic and atraumatic  Mouth/Throat: Oropharynx is clear and moist  No oropharyngeal exudate  Eyes: Conjunctivae are normal  Pupils are equal, round, and reactive to light  Right eye exhibits no discharge  Left eye exhibits no discharge  Right conjunctiva is not injected  Right conjunctiva has no hemorrhage  Left conjunctiva is not injected  Left conjunctiva has no hemorrhage  No scleral icterus  Neck: Normal range of motion  Neck supple  No JVD present  No tracheal deviation present  Thyromegaly present  Cardiovascular: Normal rate, regular rhythm and intact distal pulses  Exam reveals no gallop and no friction rub  No murmur heard  DP pulses present bilaterally, bilateral lower extremity edema +2 pitting   Pulmonary/Chest: Effort normal  No respiratory distress  He has no wheezes  He has no rales  Diminished breath sounds at base bilaterally   Abdominal: Bowel sounds are normal  He exhibits no distension and no mass  There is no tenderness  There is no rebound and no guarding  Musculoskeletal: He exhibits edema  He exhibits no tenderness or deformity  Lymphadenopathy:     He has no cervical adenopathy  Neurological: He is alert and oriented to person, place, and time  He displays normal reflexes  No cranial nerve deficit  Coordination abnormal    Skin: Skin is warm and dry  No rash noted  He is not diaphoretic  No pallor     Psychiatric: His behavior is normal  Thought content normal  Additional Data:     Lab Results: I have personally reviewed pertinent reports  Results from last 7 days  Lab Units 01/22/18  1015   WBC Thousand/uL 8 24   HEMOGLOBIN g/dL 10 5*   HEMATOCRIT % 30 8*   PLATELETS Thousands/uL 275       Results from last 7 days  Lab Units 01/22/18  1015   SODIUM mmol/L 136   POTASSIUM mmol/L 5 3   CHLORIDE mmol/L 105   CO2 mmol/L 22   BUN mg/dL 35*   CREATININE mg/dL 3 74*   CALCIUM mg/dL 8 9   TOTAL PROTEIN g/dL 6 2*   BILIRUBIN TOTAL mg/dL 0 40   ALK PHOS U/L 89   ALT U/L 19   AST U/L 16   GLUCOSE RANDOM mg/dL 360*       Results from last 7 days  Lab Units 01/22/18  1015   INR  0 88       Imaging: I have personally reviewed pertinent films in PACS    XR stroke alert portable chest   Final Result by Modesto Sellers MD (01/22 1218)      No active pulmonary disease  Workstation performed: [id number]         CTA head and neck with and without contrast   Final Result by Germán Lua MD (01/22 1107)      Normal CTA of the head or neck  No large vessel flow restrictive disease  * I personally telephoned this result to [facility] on 1/22/2018 1048 hours  Workstation performed: [id number]         CT stroke alert brain   Final Result by Germán Lua MD (01/22 1100)      No intracranial hemorrhage or mass  Progression of what most probably represents precocious chronic small vessel disease since prior study 9 months earlier  Based upon clinical presentation patient may have brainstem infarct which is not evident on    this exam but could be documented with MR        Findings were directly discussed with Cassidy Grullon on 1/22/2018 10:48 AM          Workstation performed: [id number]         MRI inpatient order    (Results Pending)       EKG, Pathology, and Other Studies Reviewed on Admission:   · EKG:  Normal sinus rhythm without ST elevation or depression 89  · Chest x-ray-no acute pulmonary disease  · Head CT-no hemorrhage or mass noted, chronic small vessel disease with suspected brainstem infarct  · CTA head and neck-no hemorrhaging, stenosis or infarct    Allscripts / Epic Records Reviewed: Yes     ** Please Note: This note has been constructed using a voice recognition system   **

## 2018-01-22 NOTE — ASSESSMENT & PLAN NOTE
· Elevated  Hemoglobin A1c 8 9 July 2017 per patient BGs 400s in the a m  likely candidate for insulin pump however patient has resisted  · Goal BG between 140-180 repeat hemoglobin A1c  · Continue home medication Lantus 24 units b i d , add lispro 7 units t i d  with meals, insulin sliding scale, Accu-Cheks q i d , hypoglycemic protocol

## 2018-01-22 NOTE — ED NOTES
Pt sleeping on stretcher in negative distress  Resp regular and non-labored  NSR on monitor  VS  Pt easily awoken by voice  Assessment unchanged  Will continue to monitor       Jenise Lr RN  01/22/18 6422

## 2018-01-22 NOTE — ASSESSMENT & PLAN NOTE
· Present upon admission elevated 212/107 possibly secondary to noncompliance with home medication in the setting of suspected TIA/CVA  · Decrease blood pressure no greater than 20%  within 24 hours ~170/95  · Hydralazine p r n  for SBP greater than 180 hold if less than 175  · Encourage compliance    Continue home medication in the a m  carvedilol 25 mg b i d , nifedipine 90 mg q day, hydralazine 50 mg t i d   · Hold home medication lisinopril due to acute kidney injury

## 2018-01-22 NOTE — ED NOTES
Per [employer], administer Lantus 24units SQ and Hydralazine 10mg IVP now, and then administer Humalog 7units plus Humalog per sliding scale right before pt eats  Dinner menu provided       Divine Maddox RN  01/22/18 4919

## 2018-01-22 NOTE — ED NOTES
Regular insulin gtt initiated at this time, algorithm 1, 10units/hr  Blood glucose 477mg/dL        Ailyn Contreras RN  01/22/18 5758

## 2018-01-22 NOTE — ED NOTES
HIEU Horn at bedside to evaluate pt Pt to be medicated per PA order for HTN  Assessment unchanged       Ally Callejas RN  01/22/18 3632

## 2018-01-22 NOTE — ASSESSMENT & PLAN NOTE
· Present upon admission BUN/creatinine elevated 35/3 74 baseline 1 9-2 2 likely secondary to uncontrolled type 1 diabetes mellitus  · Continue IVF normal saline at 100 mL/HR, monitor ins/outs, daily weights  · Obtain UA with microscopy and urine creatinine  · If BUN/creatinine continues to rise consider renal ultrasound for intrinsic causes/nephrology consultation  · Follows outpatient nephrologist Dr Roma Christensen will need close follow-up outpatient

## 2018-01-23 ENCOUNTER — GENERIC CONVERSION - ENCOUNTER (OUTPATIENT)
Dept: OTHER | Facility: OTHER | Age: 33
End: 2018-01-23

## 2018-01-23 ENCOUNTER — APPOINTMENT (INPATIENT)
Dept: ULTRASOUND IMAGING | Facility: HOSPITAL | Age: 33
DRG: 045 | End: 2018-01-23
Payer: COMMERCIAL

## 2018-01-23 ENCOUNTER — APPOINTMENT (INPATIENT)
Dept: NON INVASIVE DIAGNOSTICS | Facility: HOSPITAL | Age: 33
DRG: 045 | End: 2018-01-23
Payer: COMMERCIAL

## 2018-01-23 PROBLEM — Z86.73 HISTORY OF LACUNAR CEREBROVASCULAR ACCIDENT (CVA): Status: ACTIVE | Noted: 2018-01-22

## 2018-01-23 LAB
ALBUMIN SERPL BCP-MCNC: 2.4 G/DL (ref 3.5–5)
ALP SERPL-CCNC: 75 U/L (ref 46–116)
ALT SERPL W P-5'-P-CCNC: 16 U/L (ref 12–78)
ANION GAP SERPL CALCULATED.3IONS-SCNC: 10 MMOL/L (ref 4–13)
AST SERPL W P-5'-P-CCNC: 15 U/L (ref 5–45)
BACTERIA UR QL AUTO: ABNORMAL /HPF
BILIRUB SERPL-MCNC: 0.3 MG/DL (ref 0.2–1)
BILIRUB UR QL STRIP: NEGATIVE
BUN SERPL-MCNC: 43 MG/DL (ref 5–25)
CALCIUM SERPL-MCNC: 8.3 MG/DL (ref 8.3–10.1)
CHLORIDE SERPL-SCNC: 108 MMOL/L (ref 100–108)
CHOLEST SERPL-MCNC: 215 MG/DL (ref 50–200)
CK MB SERPL-MCNC: 1.4 % (ref 0–2.5)
CK MB SERPL-MCNC: 2.3 NG/ML (ref 0–5)
CK SERPL-CCNC: 161 U/L (ref 39–308)
CLARITY UR: CLEAR
CO2 SERPL-SCNC: 21 MMOL/L (ref 21–32)
COLOR UR: YELLOW
CREAT SERPL-MCNC: 4.57 MG/DL (ref 0.6–1.3)
CREAT UR-MCNC: 76.5 MG/DL
CREAT UR-MCNC: 76.5 MG/DL
ERYTHROCYTE [DISTWIDTH] IN BLOOD BY AUTOMATED COUNT: 13.9 % (ref 11.6–15.1)
EST. AVERAGE GLUCOSE BLD GHB EST-MCNC: 137 MG/DL
GFR SERPL CREATININE-BSD FRML MDRD: 16 ML/MIN/1.73SQ M
GLUCOSE SERPL-MCNC: 112 MG/DL (ref 65–140)
GLUCOSE SERPL-MCNC: 128 MG/DL (ref 65–140)
GLUCOSE SERPL-MCNC: 135 MG/DL (ref 65–140)
GLUCOSE SERPL-MCNC: 152 MG/DL (ref 65–140)
GLUCOSE SERPL-MCNC: 179 MG/DL (ref 65–140)
GLUCOSE SERPL-MCNC: 188 MG/DL (ref 65–140)
GLUCOSE SERPL-MCNC: 192 MG/DL (ref 65–140)
GLUCOSE SERPL-MCNC: 220 MG/DL (ref 65–140)
GLUCOSE SERPL-MCNC: 242 MG/DL (ref 65–140)
GLUCOSE SERPL-MCNC: 50 MG/DL (ref 65–140)
GLUCOSE SERPL-MCNC: 51 MG/DL (ref 65–140)
GLUCOSE SERPL-MCNC: 68 MG/DL (ref 65–140)
GLUCOSE SERPL-MCNC: 80 MG/DL (ref 65–140)
GLUCOSE SERPL-MCNC: 86 MG/DL (ref 65–140)
GLUCOSE UR STRIP-MCNC: ABNORMAL MG/DL
HBA1C MFR BLD: 6.4 % (ref 4.2–6.3)
HCT VFR BLD AUTO: 26.8 % (ref 36.5–49.3)
HDLC SERPL-MCNC: 38 MG/DL (ref 40–60)
HGB BLD-MCNC: 9.1 G/DL (ref 12–17)
HGB UR QL STRIP.AUTO: ABNORMAL
INR PPP: 0.95 (ref 0.86–1.16)
KETONES UR STRIP-MCNC: NEGATIVE MG/DL
LDLC SERPL CALC-MCNC: 144 MG/DL (ref 0–100)
LEUKOCYTE ESTERASE UR QL STRIP: ABNORMAL
MAGNESIUM SERPL-MCNC: 2 MG/DL (ref 1.6–2.6)
MCH RBC QN AUTO: 30.7 PG (ref 26.8–34.3)
MCHC RBC AUTO-ENTMCNC: 34 G/DL (ref 31.4–37.4)
MCV RBC AUTO: 91 FL (ref 82–98)
MICROALBUMIN UR-MCNC: 7100 MG/L (ref 0–20)
MICROALBUMIN/CREAT 24H UR: 9281 MG/G CREATININE (ref 0–30)
MUCOUS THREADS UR QL AUTO: ABNORMAL
NITRITE UR QL STRIP: NEGATIVE
NON-SQ EPI CELLS URNS QL MICRO: ABNORMAL /HPF
PH UR STRIP.AUTO: 6 [PH] (ref 4.5–8)
PLATELET # BLD AUTO: 235 THOUSANDS/UL (ref 149–390)
PMV BLD AUTO: 10.4 FL (ref 8.9–12.7)
POTASSIUM SERPL-SCNC: 4.2 MMOL/L (ref 3.5–5.3)
PROT SERPL-MCNC: 5.4 G/DL (ref 6.4–8.2)
PROT UR STRIP-MCNC: >=300 MG/DL
PROTHROMBIN TIME: 13 SECONDS (ref 12.1–14.4)
RBC # BLD AUTO: 2.96 MILLION/UL (ref 3.88–5.62)
RBC #/AREA URNS AUTO: ABNORMAL /HPF
SODIUM SERPL-SCNC: 139 MMOL/L (ref 136–145)
SP GR UR STRIP.AUTO: 1.02 (ref 1–1.03)
TRIGL SERPL-MCNC: 165 MG/DL
TSH SERPL DL<=0.05 MIU/L-ACNC: 2.21 UIU/ML (ref 0.36–3.74)
UROBILINOGEN UR QL STRIP.AUTO: 0.2 E.U./DL
WBC # BLD AUTO: 8.75 THOUSAND/UL (ref 4.31–10.16)
WBC #/AREA URNS AUTO: ABNORMAL /HPF

## 2018-01-23 PROCEDURE — 86430 RHEUMATOID FACTOR TEST QUAL: CPT | Performed by: INTERNAL MEDICINE

## 2018-01-23 PROCEDURE — 83036 HEMOGLOBIN GLYCOSYLATED A1C: CPT | Performed by: INTERNAL MEDICINE

## 2018-01-23 PROCEDURE — 85027 COMPLETE CBC AUTOMATED: CPT | Performed by: INTERNAL MEDICINE

## 2018-01-23 PROCEDURE — 85610 PROTHROMBIN TIME: CPT | Performed by: INTERNAL MEDICINE

## 2018-01-23 PROCEDURE — 82550 ASSAY OF CK (CPK): CPT | Performed by: NURSE PRACTITIONER

## 2018-01-23 PROCEDURE — 82948 REAGENT STRIP/BLOOD GLUCOSE: CPT

## 2018-01-23 PROCEDURE — 81001 URINALYSIS AUTO W/SCOPE: CPT | Performed by: PHYSICIAN ASSISTANT

## 2018-01-23 PROCEDURE — 86803 HEPATITIS C AB TEST: CPT | Performed by: INTERNAL MEDICINE

## 2018-01-23 PROCEDURE — 82553 CREATINE MB FRACTION: CPT | Performed by: NURSE PRACTITIONER

## 2018-01-23 PROCEDURE — 86235 NUCLEAR ANTIGEN ANTIBODY: CPT | Performed by: INTERNAL MEDICINE

## 2018-01-23 PROCEDURE — 82043 UR ALBUMIN QUANTITATIVE: CPT | Performed by: PHYSICIAN ASSISTANT

## 2018-01-23 PROCEDURE — 80053 COMPREHEN METABOLIC PANEL: CPT | Performed by: INTERNAL MEDICINE

## 2018-01-23 PROCEDURE — 80061 LIPID PANEL: CPT | Performed by: INTERNAL MEDICINE

## 2018-01-23 PROCEDURE — 83735 ASSAY OF MAGNESIUM: CPT | Performed by: INTERNAL MEDICINE

## 2018-01-23 PROCEDURE — 84443 ASSAY THYROID STIM HORMONE: CPT | Performed by: INTERNAL MEDICINE

## 2018-01-23 PROCEDURE — 93306 TTE W/DOPPLER COMPLETE: CPT

## 2018-01-23 PROCEDURE — 76770 US EXAM ABDO BACK WALL COMP: CPT

## 2018-01-23 PROCEDURE — 82570 ASSAY OF URINE CREATININE: CPT | Performed by: PHYSICIAN ASSISTANT

## 2018-01-23 RX ORDER — CARVEDILOL 12.5 MG/1
25 TABLET ORAL 2 TIMES DAILY WITH MEALS
Status: DISCONTINUED | OUTPATIENT
Start: 2018-01-23 | End: 2018-01-29

## 2018-01-23 RX ORDER — INSULIN GLARGINE 100 [IU]/ML
12 INJECTION, SOLUTION SUBCUTANEOUS EVERY 12 HOURS SCHEDULED
Status: DISCONTINUED | OUTPATIENT
Start: 2018-01-23 | End: 2018-01-24

## 2018-01-23 RX ADMIN — ATORVASTATIN CALCIUM 40 MG: 40 TABLET, FILM COATED ORAL at 18:00

## 2018-01-23 RX ADMIN — HYDRALAZINE HYDROCHLORIDE 50 MG: 25 TABLET, FILM COATED ORAL at 15:55

## 2018-01-23 RX ADMIN — DOCUSATE SODIUM 100 MG: 100 CAPSULE, LIQUID FILLED ORAL at 18:01

## 2018-01-23 RX ADMIN — HYDRALAZINE HYDROCHLORIDE 50 MG: 25 TABLET, FILM COATED ORAL at 09:38

## 2018-01-23 RX ADMIN — DOCUSATE SODIUM 100 MG: 100 CAPSULE, LIQUID FILLED ORAL at 09:38

## 2018-01-23 RX ADMIN — SODIUM CHLORIDE 100 ML/HR: 0.9 INJECTION, SOLUTION INTRAVENOUS at 09:35

## 2018-01-23 RX ADMIN — SODIUM CHLORIDE 100 ML/HR: 0.9 INJECTION, SOLUTION INTRAVENOUS at 18:20

## 2018-01-23 RX ADMIN — HYDRALAZINE HYDROCHLORIDE 50 MG: 25 TABLET, FILM COATED ORAL at 20:11

## 2018-01-23 RX ADMIN — ASPIRIN 162 MG: 81 TABLET, COATED ORAL at 09:38

## 2018-01-23 RX ADMIN — SODIUM CHLORIDE 5 MG/HR: 0.9 INJECTION, SOLUTION INTRAVENOUS at 19:10

## 2018-01-23 RX ADMIN — INSULIN GLARGINE 12 UNITS: 100 INJECTION, SOLUTION SUBCUTANEOUS at 22:27

## 2018-01-23 RX ADMIN — CARVEDILOL 25 MG: 12.5 TABLET, FILM COATED ORAL at 16:00

## 2018-01-23 NOTE — CONSULTS
Consultation - Nephrology   Izabella Mathur 27 y o  male MRN: [id number]  Unit/Bed#:  Encounter: [id number]    ASSESSMENT:  1  Acute Kidney Injury vs progression of chronic kidney disease- Likely etiology secondary to hypertensive emergency complicated by IV contrast load (1/22) +/- contrast induced nephropathy in the setting of lisinopril +/- mild NSAID use  - would avoid gadolinium if able (will discuss with Dr Jb Singh)  - avoid IV contrast until renal function stabilizes  - avoid nephrotoxics (NSAIDs & lisinopril)  - avoid hypotension  - renal ultrasound pending  - CPK level pending  - PVR okay at 112ml  - monitor strict intake/output/weights  - no urgent need for dialysis but discussed that dialysis was in his future  - continue IVF  2  Chronic Kidney Disease stage III- Baseline creatinine 1 9 in early 2016 but was 2 3 in July 2017  Suspected etiology from diabetic nephropathy  I do not feel a renal biopsy is warranted at this point  Previously followed with Dr Josh Burgess (last seen in 2/2016)  - UA: + glucose, trace blood (1-2 RBC), >300 protein  3  Hypertensive Urgency- BP still elevated, goal BP by tomorrow morning is in the 962V systolic  - holding lisinopril  - cardene drip managed by primary team  - currently on hydralazine 50mg TID (home dose)  - start carvedilol 25mg BID (home dose)  4  Proteinuria- likely secondary to diabetic nephropathy  - will check SPEP and UPEP  5  Diabetes mellitus, type 1- diagnosed around the age of 2  5  Bilateral Right Gaze Palsy / ? Demyelinating Disease / Cerebral Vascular Disease- neurology on board  - will discuss with Dr Jb Singh in regard to gadolinium  7  Anemia- check iron studies  8   BMD- check phosphorus and vitamin D level    PLAN:  Check iron studies  Check SPEP/UPEP  Check phos and vitamin D  Start carvedilol 25mg BID      HISTORY OF PRESENT ILLNESS:  Requesting Physician: Anjelica Stanley MD  Reason for Consult: LLUVIA/CKD    Izabella Mathur is a 27 y o  year old male who was admitted to [address] [place] [address] after presenting with vision changes and gait/balance abnormalities starting the morning of 1/22  The patient has a significant history of type 1 diabetes mellitus since the age of 1  He is on lantus and sliding scale humalog at home  He states that his morning blood glucose levels are the worst and can range up into the 400s  Throughout the day his blood glucose level is usually more controlled  He has not seen an endocrinologist since he was very young  He had a stroke in 2015 while at a bachelor party on the 4th of July  He saw Dr Jazmine Castillo in 2016 but was lost to follow up  The patient states he takes Advil about once a week for headaches  He does not check his blood pressures at home but states when he goes to see his PCP they are usually good  He denies urinary problems  He denies shortness of breath  A renal consultation is requested today for assistance in the management of acute kidney injury on chronic kidney disease  PAST MEDICAL HISTORY:  Past Medical History:   Diagnosis Date    Cerebellar stroke side undetermined 2015 2013    Diabetes type 1, controlled ([facility] [address] )     Hypertension        PAST SURGICAL HISTORY:  History reviewed  No pertinent surgical history  ALLERGIES:  Allergies   Allergen Reactions    Sulfa Antibiotics Rash       SOCIAL HISTORY:  History   Alcohol Use    Yes     Comment: socially     History   Drug Use    Frequency: 3 0 times per week    Types: Marijuana     Comment: weekly     History   Smoking Status    Current Every Day Smoker    Packs/day: 0 50    Types: Cigarettes   Smokeless Tobacco    Never Used       FAMILY HISTORY:  History reviewed  No pertinent family history  No known renal family history    Grandfather with type 2 diabetes    MEDICATIONS:    Current Facility-Administered Medications:     acetaminophen (TYLENOL) tablet 650 mg, 650 mg, Oral, Q4H PRN, Anthony Rivas PA-C   aluminum-magnesium hydroxide-simethicone (MYLANTA) 200-200-20 mg/5 mL oral suspension 30 mL, 30 mL, Oral, Q6H PRN, Justina L Jassi, PA-C    atorvastatin (LIPITOR) tablet 40 mg, 40 mg, Oral, QPM, Justina L Jassi, PA-C, 40 mg at 01/22/18 1752    docusate sodium (COLACE) capsule 100 mg, 100 mg, Oral, BID, Justina L Jassi, PA-C, 100 mg at 01/23/18 9429    hydrALAZINE (APRESOLINE) tablet 50 mg, 50 mg, Oral, TID, Justina L Jassi, PA-C, 50 mg at 01/23/18 8712    insulin regular (HumuLIN R,NovoLIN R) 1 Units/mL in sodium chloride 0 9 % 100 mL infusion, 0 3-21 Units/hr, Intravenous, Titrated, Justina L Jassi, PA-C, Last Rate: 6 mL/hr at 01/23/18 1400, 6 Units/hr at 01/23/18 1400    niCARdipine (CARDENE) 25 mg (STANDARD CONCENTRATION) in sodium chloride 0 9% 250 mL, 1-15 mg/hr, Intravenous, Titrated, Justina L Jassi, PA-C, Stopped at 01/23/18 1220    nicotine (NICODERM CQ) 21 mg/24 hr TD 24 hr patch 1 patch, 1 patch, Transdermal, Daily, Justina L Jassi, PA-C    ondansetron (ZOFRAN) injection 4 mg, 4 mg, Intravenous, Q6H PRN, Justina L Jassi, PA-C, 4 mg at 01/22/18 1959    sodium chloride 0 9 % infusion, 100 mL/hr, Intravenous, Continuous, Justina L Jassi, PA-C, Last Rate: 100 mL/hr at 01/23/18 1200, 100 mL/hr at 01/23/18 1200    REVIEW OF SYSTEMS:  A complete review of systems was performed and found to be negative unless otherwise noted in the history of present illness  General: No fevers, chills  Cardiovascular:  No chest pain, No leg edema  Respiratory: No cough, sputum production,  No shortness of breath  Gastrointestinal:  No nausea/vomiting,  No diarrhea,  No abdominal pain  Genitourinary: No hematuria  No foamy urine    No dysuria    PHYSICAL EXAM:  Current Weight: Weight - Scale: 91 3 kg (201 lb 4 5 oz)  First Weight: Weight - Scale: 93 5 kg (206 lb 2 1 oz)  Vitals:    01/23/18 1245 01/23/18 1400 01/23/18 1445 01/23/18 1500   BP: (!) 182/95 (!) 180/95 (!) 183/95 (!) 184/103   BP Location:    Left arm   Pulse: 93 86 90 90   Resp:    20 Temp:    98 1 °F (36 7 °C)   TempSrc:    Oral   SpO2: 99% 98% 100% 98%   Weight:       Height:           Intake/Output Summary (Last 24 hours) at 01/23/18 1523  Last data filed at 01/23/18 1400   Gross per 24 hour   Intake          2169 75 ml   Output             1040 ml   Net          1129 75 ml     General: NAD  Skin: no rash  HEENT: right gaze  Neck: supple  Chest: CTAB  CVS: RRR  Abdomen: soft, nt, nd  Extremities: trace bilateral edema  Neuro: alert awake  Psych: mood and affect appropriate     Lab Results:     Results from last 7 days  Lab Units 01/23/18  0450 01/22/18  1942 01/22/18  1015   WBC Thousand/uL 8 75  --  8 24   HEMOGLOBIN g/dL 9 1*  --  10 5*   HEMATOCRIT % 26 8*  --  30 8*   PLATELETS Thousands/uL 235  --  275   SODIUM mmol/L 139 136 136   POTASSIUM mmol/L 4 2 4 8 5 3   CHLORIDE mmol/L 108 105 105   CO2 mmol/L 21 24 22   BUN mg/dL 43* 43* 35*   CREATININE mg/dL 4 57* 4 31* 3 74*   CALCIUM mg/dL 8 3 8 7 8 9   MAGNESIUM mg/dL 2 0  --  2 0   ALBUMIN g/dL 2 4*  --  2 8*   TOTAL PROTEIN g/dL 5 4*  --  6 2*   BILIRUBIN TOTAL mg/dL 0 30  --  0 40   ALK PHOS U/L 75  --  89   ALT U/L 16  --  19   AST U/L 15  --  16   GLUCOSE RANDOM mg/dL 86 392* 360*

## 2018-01-23 NOTE — PROGRESS NOTES
Progress Note - Neurology   Rony Mckeon 27 y o  male MRN: [id number]  Unit/Bed#:  Encounter: [id number]        Assessment/Plan :  1  Bilateral right gaze palsy- we appreciate Ophthalmology seeing this patient in such a timely fashion  They note no acute disease process separate from that which we have identified as well that may be responsible for his vision disturbance at this time  Apparent new onset CVA with MRI diffusion abnormality this noted in both the right frontal cortical area as well as a small midline lesion in the brainstem/mid brain area  He has several risk factors coarse for stroke including that of his untreated hypertension, his dyslipidemia, as well as his long-standing diabetes  CTA of head and neck was clear  He is currently started on aspirin this morning however given the possibility demyelinating disease, see below they are currently being held particularly the Plavix for a possible spinal tap on 1-26   2  Questionable demyelinating disease verses the possibility of chronic white matter changes noted with gadolinium on brain MRI may represent that of MS or other autoimmune demyelinating disease  This was reviewed with the patient and his parents at bedside  We have asked that he go back for both C and T spine imaging with and without gadolinium as well  Will likely have this gentleman undergo spinal tap testing down in Interventional Radiology on January 26th   3  Cerebral vascular disease-his MRI aside from the questionable enhancing lesions noted above is also seems to be significant for a degree of chronic small vessel disease out of character for his age  4  Type 1 diabetes- the patient reports he has been able to maintain his insurance for his insulin and has been compliant with his diabetic management regime  Reviewed with the patient and his family at the bedside is his current A1c which is 6 4 and markedly improved from his A1c of 8 9 in July of 2017    The patient reports he does not have an endocrinologist since he was a young child  I suggest that the patient will be re-establishing contact with an endocrinologist either during this hospitalization or in the near future  5  Uncontrolled hypertension- the patient reports he does not check his blood pressures at home  He had seen a nephrologist to put him on the current lisinopril  He reports he has not known that he has had the degree of hypertension that was present upon his admission  I discussed with the patient and his family following up with Internal Medicine he may need a nephrologist to assist with the tighter control of his blood pressures as well as his renal function     6  Acute on likely chronic kidney disease- this patient is unaware of what his renal function was previous to this admission  He reports he has not seen a nephrologist in years  May certainly benefit from a renal consult at this time  Plan: At this time the patient will be continuing to stay in the ICU as he Internal Medicine team works to manage his refractory blood pressure  He continues today feeling generally well without headache despite his blurry vision over the last a 36 hours  His blood sugars are showing good control at this point he remains on an insulin drip  He will hopefully be starting physical and occupational therapy in light of his restricted vision to the left side  We have discussed with the patient our concern of his MRI and the question of demyelinating disease that was raised today  He is agreeable to undergoing a repeat MRI for both C and T spines with gadolinium, as well as undergoing spinal tap by either Neurology at the bedside tomorrow or by interventional radiology on Friday the 26th  The patient as well as his mother in particular had several questions all of which we attempted to answer at this point given are limited database at this time  His echocardiogram has been done but is pending  We reinforced with them control of routine stroke risk factors including that of blood pressure diabetes dyslipidemia and the promotion of wellness with positive diet and exercise  Thank you for allowing us participate in the care of this pleasant gentleman  Should any questions or concerns please not hesitate to give us a call  He will be following up with us in the office as well in approximately 4-6 weeks  He has seen Dr Chuyita Chu previously and he will be continuing to follow with him as well  Subjective:   My vision is still pretty blurry I do not have a headache  ROS: 12 system cued query was positive for the complaints of continued poor vision  He denies any headache when queried specifically  He reports a slight degree of subjective weakness without numbness or tingling at the left lip cheek area  No chest pain no shortness of breath  He does not feel weak or have other lateralizing signs or symptoms  There is no dysesthesias reported  No bladder or bowel complaints when queried specifically either  Patient reports that he has had sensations of being off balance before but is always felt as if it is related to his previous stroke  His mother reports that she feels his left facial droop which she thinks he had on his 1st stroke in 2015 is worse today  Vitals: Blood pressure (!) 182/95, pulse 93, temperature 98 7 °F (37 1 °C), temperature source Oral, resp  rate 20, height 5' 10" (1 778 m), weight 91 3 kg (201 lb 4 5 oz), SpO2 99 %  ,Body mass index is 28 88 kg/m²  MEDS:    atorvastatin 40 mg Oral QPM   docusate sodium 100 mg Oral BID   hydrALAZINE 50 mg Oral TID   nicotine 1 patch Transdermal Daily   Plavix and aspirin are currently on hold  Physical Exam:    Meagan Azaellise seen in:  In the critical care bed  His parents are at the bedside     General appearance: alert,   Neck: No carotid bruit  Lungs, Heart, & abdomen:  WNL  Extremities: atraumatic, no cyanosis or edema    Neurologic: Mental status: Alert, completely oriented, spontaneous conversation is fluent without aphasia or dysarthria, thought content appropriate  He seems to be appropriately concerned and off anti can reporting his occasional noncompliance with his blood pressure medications but he reports some seemingly believable reports of controlling his blood sugar and his attempts that improving his diet and exercise  CN:  Continues with a right gaze preference both at rest and during EOM maneuvers  He still does have nystagmus on several or in several directions  He appears to also have a limited upgaze  Gaze conjugate when he is focusing towards the right side  Otherwise non lateralizing sensory  He has a left lateralizing nasolabial fold weakness on his motor exam,  Reminder [id number] normal    Motor: full power age appropriate x 4 limbs, a subtle degree of increased spasticity on his left upper extremity was appreciated to manipulation  Sensory:  Grossly intact  X 4 limbs, PP not tested  Cerebellar: no ataxia or past pointing w pronation from a modified seated Romberg position  Finger taps age appropriate  Gait:  Has not been gaited yet  DTR's:  +3 with 2 beats of clonus in the right lower extremity compared to the left  Plantars: downgoing left mute on the right      Lab Results:   I have personally reviewed pertinent reports    , CBC:   Results from last 7 days  Lab Units 01/23/18  0450 01/22/18  1015   WBC Thousand/uL 8 75 8 24   RBC Million/uL 2 96* 3 42*   HEMOGLOBIN g/dL 9 1* 10 5*   HEMATOCRIT % 26 8* 30 8*   MCV fL 91 90   PLATELETS Thousands/uL 235 275   , BMP/CMP:   Results from last 7 days  Lab Units 01/23/18  0450 01/22/18  1942 01/22/18  1015   SODIUM mmol/L 139 136 136   POTASSIUM mmol/L 4 2 4 8 5 3   CHLORIDE mmol/L 108 105 105   CO2 mmol/L 21 24 22   ANION GAP mmol/L 10 7 9   BUN mg/dL 43* 43* 35*   CREATININE mg/dL 4 57* 4 31* 3 74*   GLUCOSE RANDOM mg/dL 86 392* 360*   CALCIUM mg/dL 8 3 8 7 8 9 AST U/L 15  --  16   ALT U/L 16  --  19   ALK PHOS U/L 75  --  89   TOTAL PROTEIN g/dL 5 4*  --  6 2*   ALBUMIN g/dL 2 4*  --  2 8*   BILIRUBIN TOTAL mg/dL 0 30  --  0 40   EGFR ml/min/1 73sq m 16 17 20   , Vitamin B12:   , HgBA1C:   Results from last 7 days  Lab Units 01/23/18 0450   HEMOGLOBIN A1C % 6 4*   , TSH:   Results from last 7 days  Lab Units 01/23/18 0450   TSH 3RD GENERATON uIU/mL 2 208   , Coagulation:   Results from last 7 days  Lab Units 01/23/18 0450   INR  0 95   , Lipid Profile:   Results from last 7 days  Lab Units 01/23/18 0450   HDL mg/dL 38*   CHOLESTEROL mg/dL 215*   LDL CALC mg/dL 144*   TRIGLYCERIDES mg/dL 165*   , Urinalysis:   Results from last 7 days  Lab Units 01/23/18  0218   COLOR UA  Yellow   CLARITY UA  Clear   SPEC GRAV UA  1 020   PH UA  6 0   LEUKOCYTES UA  Elevated glucose may cause decreased leukocyte values  See urine microscopic for [facility] result/*   NITRITE UA  Negative   PROTEIN UA mg/dl >=300*   GLUCOSE UA mg/dl >=1000 (1%)*   KETONES UA mg/dl Negative   BILIRUBIN UA  Negative   BLOOD UA  Trace-Intact*    His renal function continues to be elevated somewhat markedly so with a 4 5 creatinine today compared with 3 5 yesterday despite IV hydration  Imaging Studies: I have personally reviewed pertinent films in PACS and His MRI notes the small areas of of appear to be punctate restricted diffusion in both the mid brain approximate to the midline as well as in the mid frontal region  Has a what appears to be a significant degree of chronic small vessel disease on his FLAIR images  And on his post gadolinium sequences he has what appears to be an area of true enhancement also in the anterior frontal lobe though again near the midline on the right       Given the findings on his MRI of the question of a demyelinating disease imaging both his C and T coarse for the question of neuromyelitis optica would certainly be appropriate however given his renal compromise at this time it is non urgent and should be held  EEG, Pathology, and Other Studies: His EEG was within normal limits  Counseling / Coordination of Care  Total time spent today 70 minutes  Greater than 50% of total time was spent with the patient and / or family counseling and / or coordination of care  A description of the counseling / coordination of care: All of the above was discussed with the patient his parents at the bedside  His mother also had several concerns which were voiced outside of the patient's hearing in the gonzalez  Other questions revolved around as 1 would expect his current condition and the diagnostic testing is prognosis for recovery and its implications  All of their questions were answered films were reviewed at the bedside medications and lab tests were also reviewed  A overwhelming majority of today's visit was spent with counseling and coordination of care  Dictation voice to text software has been used in the creation of this document  Please consider this in light of any contextual or grammatical errors

## 2018-01-23 NOTE — PROCEDURES
ELECTROENCEPHALOGRAM (EEG)        Patient Name:  Ya Calderón  MRN: [id number]   :  1985 File #: [id number]    Age: 27 y o  Encounter #: [id number]   Date performed: 18                                                                    Report date: 2018                                                               Study type: Routine EEG     ICD 10 diagnosis: Spells/Fit NOS R56 9     Start time: 0 End time:       -------------------------------------------------------------------------------------------------------------------   Patient History:  27year old male admitted through the emergency room on 18 because of symptoms of dizziness and nausea  Systolic blood pressure was 230 on arrival  Routine EEG is being done to check for seizures because "bilateral eye deviation" was observed  Reportedly has a history of stroke that occurred in 2015       Medications include:   atorvastatin 40 mg Oral QPM   docusate sodium 100 mg Oral BID   hydrALAZINE 50 mg Oral TID   nicotine 1 patch Transdermal Daily         -------------------------------------------------------------------------------------------------------------------   Description of Procedure:  · 32 channel digital recording with electrodes placed according to the International 10-20 system with additional T1/T2 electrodes, EOG, EKG, and simultaneous video  A monitoring technologist supervised the continuous recording   The recording was technically satisfactory      -------------------------------------------------------------------------------------------------------------------   Results:   Background Activity:   During wakefulness, background activity consists of continuous, well-formed, normal voltage, posteriorly dominant, symmetric, reactive 10 HZ alpha activity with AP gradient present      Activation Procedures:   Hyperventilation was not performed      Stepped photic stimulation between 1-30 cps was performed and did not alter the tracing  Abnormal Findings:     No focal abnormalities nor interictal epileptiform discharges were noted  No electrographic seizures occurred during this recording      Other findings: The single lead EKG demonstrated a regular rhythm      Events:   No push button events occurred during this study      -------------------------------------------------------------------------------------------------------------------   Interpretation: This is a normal 31 minute EEG        Scribe Attestation:  Documented by Ximena Melvin, acting as a scribe for Dr Romana Mallet on 1/23/18 at 1:27 PM      Physician Attestation:  All documentation recorded by the scribe accurately reflects the service I personally performed and the decisions made by me   I was present during the time the encounter was recorded and have reviewed all the documentation and confirm that it is all accurate and representative of the encounter        Jimenez Quick MD   [id number] [facility]  Pager # [phone]

## 2018-01-23 NOTE — SOCIAL WORK
CM was contacted by ICU nurse manager regarding patient and that family had questions regarding his insurance as they reported they did not think he had any  CM contacted Patel Panda from financial assistance who verified that patient had full [place] coverage  Patel Panda said she would print verification and visit with patient and his family

## 2018-01-23 NOTE — CASE MANAGEMENT
Initial Clinical Review    Admission: Date/Time/Statement: 1/22/18 @ 1139     Orders Placed This Encounter   Procedures    Inpatient Admission (expected length of stay for this patient is greater than two midnights)     Standing Status:   Standing     Number of Occurrences:   1     Order Specific Question:   Admitting Physician     Answer:   Tyree Brownlee     Order Specific Question:   Level of Care     Answer:   Med Surg [16]     Order Specific Question:   Estimated length of stay     Answer:   More than 2 Midnights     Order Specific Question:   Certification     Answer:   I certify that inpatient services are medically necessary for this patient for a duration of greater than two midnights  See H&P and MD Progress Notes for additional information about the patient's course of treatment  ED: Date/Time/Mode of Arrival:   ED Arrival Information     Expected Arrival Acuity Means of Arrival Escorted By Service Admission Type    - 1/22/2018 10:05 Emergent Walk-In Family Member Critical Care/ICU Emergency    Arrival Complaint    Dizziness/nausea          Chief Complaint:   Chief Complaint   Patient presents with    Dizziness     c/o dizziness and nausea since he woke up also states his vision is blurry  stroke 2 5 years ago       History of Illness: 49-year-old male comes in complaining of dizziness blurry vision nausea and vomiting  Patient has a history of diabetes type 1 chronic kidney disease and had a stroke in 2015  Patient states these are the same symptoms he had when he had his 1st stroke  Patient has a noticeable in were turning of his left eye       ED Vital Signs:   ED Triage Vitals   Temperature Pulse Respirations Blood Pressure SpO2   01/22/18 1012 01/22/18 1012 01/22/18 1012 01/22/18 1013 01/22/18 1012   98 °F (36 7 °C) 92 18 (!) 212/107 100 %      Temp Source Heart Rate Source Patient Position - Orthostatic VS BP Location FiO2 (%)   01/22/18 1012 01/22/18 1012 01/22/18 1012 01/22/18 1012 --   Oral Monitor Sitting Left arm       Pain Score       01/22/18 1030       No Pain        Wt Readings from Last 1 Encounters:   01/22/18 91 3 kg (201 lb 4 5 oz)       Vital Signs (abnormal):   Date and Time Temp Pulse SpO2 Resp BP   01/22/18 1900 -- 90 99 % 17  187/99   01/22/18 1845 -- 90 99 % 18  181/92   01/22/18 1830 -- 90 98 % 17  180/96   01/22/18 1800 -- -- -- --  200/100   01/22/18 1730 -- 92 100 % 18  198/97   01/22/18 1715 -- 88 99 % 18  202/94   01/22/18 1710 -- -- -- --  212/104   01/22/18 1645 -- 86 99 % 18  193/98   01/22/18 1643 -- 86 100 % 19  193/102   01/22/18 1626 -- -- -- -- --   01/22/18 1615 -- 82 99 % --  188/94   01/22/18 1545 -- 86 99 % 18  198/98   01/22/18 1535 -- -- -- --  183/101   01/22/18 1531 -- 86 99 % 18  207/104   01/22/18 1530 -- -- -- -- --   01/22/18 1515 -- 84 99 % --  197/97   01/22/18 1445 -- 84 100 % 18  200/101   01/22/18 1425 -- -- -- --  212/101     Abnormal Labs/Diagnostic Test Results: NT-pro BNP 2,833, Bun 35, Creat 3 74, Glucose 360, H/H 10 5/30 8    CT Stroke Brain: No intracranial hemorrhage or mass   Progression of what most probably represents precocious chronic small vessel disease since prior study 9 months earlier  Junie Ornelas upon clinical presentation patient may have brainstem infarct which is not evident on   this exam but could be documented with MR     ED Treatment:   Medication Administration from 01/22/2018 1005 to 01/22/2018 1934       Date/Time Order Dose Route Action Action by Comments     01/22/2018 1025 labetalol (NORMODYNE) injection 10 mg 10 mg Intravenous Given Mary Quinonez RN /116     01/22/2018 1029 ondansetron (ZOFRAN) injection 4 mg 4 mg Intravenous Given Mary Quinonez RN      01/22/2018 1038 iohexol (OMNIPAQUE) 350 MG/ML injection (SINGLE-DOSE) 85 mL 85 mL Intravenous Given Yani Greene      01/22/2018 1045 clopidogrel (PLAVIX) tablet 300 mg 300 mg Oral Given Mike Landry RN      01/22/2018 1045 aspirin chewable tablet 162 mg 162 mg Oral Given Isaiah Andrade RN      01/22/2018 1150 labetalol (NORMODYNE) injection 10 mg 10 mg Intravenous Given Isaiah Andrade /94 HR 79     01/22/2018 1150 insulin regular (HumuLIN R,NovoLIN R) injection 5 Units 5 Units Intravenous Given Isaiah Andrade RN      01/22/2018 1425 hydrALAZINE (APRESOLINE) injection 5 mg 5 mg Intravenous Given Isaiah Andrade RN      01/22/2018 1752 atorvastatin (LIPITOR) tablet 40 mg 40 mg Oral Given Isaiah Andrade, RN      01/22/2018 1622 carvedilol (COREG) tablet 25 mg 25 mg Oral Given Isaiah Andrade, RN      01/22/2018 1600 labetalol (NORMODYNE) injection 10 mg 10 mg Intravenous Given Mary Quinonez RN bp 193/100 hr 91     01/22/2018 1710 hydrALAZINE (APRESOLINE) injection 10 mg 10 mg Intravenous Given Isaiah Andrade, MIKAEL      01/22/2018 1720 insulin glargine (LANTUS) subcutaneous injection 24 Units 24 Units Subcutaneous Given Isaiah Andrade RN      01/22/2018 1810 niCARdipine (CARDENE) 25 mg (STANDARD CONCENTRATION) in sodium chloride 0 9% 250 mL 2 5 mg/hr Intravenous New Bag Mary Quinonez RN /105 HR  89     01/22/2018 1815 insulin regular (HumuLIN R,NovoLIN R) 1 Units/mL in sodium chloride 0 9 % 100 mL infusion 10 Units/hr Intravenous New Bag Mary Quinonez RN         Physical Exam   Constitutional: He is oriented to person, place, and time  He appears well-developed and well-nourished  Non-toxic appearance  Cardiovascular: Normal rate, regular rhythm, normal heart sounds and intact distal pulses  No extrasystoles are present  Pulmonary/Chest: Effort normal  He has no decreased breath sounds  He has no wheezes  He has no rhonchi  He has no rales  He exhibits no tenderness and no deformity  Neurological: He is alert and oriented to person, place, and time  He has normal strength and normal reflexes  A cranial nerve deficit is present  No sensory deficit  He displays a negative Romberg sign     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      Past Medical/Surgical History: Active Ambulatory Problems     Diagnosis Date Noted    No Active Ambulatory Problems     Resolved Ambulatory Problems     Diagnosis Date Noted    No Resolved Ambulatory Problems     Past Medical History:   Diagnosis Date    Cerebellar stroke side undetermined 2015 2013    Diabetes type 1, controlled ([place] [address] )     Hypertension        Admitting Diagnosis: Dizziness [R42]  Blurred vision [H53 8]  Nausea [R11 0]  Hypertension [I10]  CVA (cerebral vascular accident) ([place] [address] ) [I63 9]  Chronic kidney disease [N18 9]  Diabetes mellitus type 1 ([place] [address] ) [E10 9]  Cerebrovascular accident (CVA), unspecified mechanism ([place] [address] ) [I63 9]    Age/Sex: 27 y o  male    Assessment/Plan:     * Conjugate gaze palsy   Assessment & Plan     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 Neurology consulted stroke alert upon arrival   Stat head CT negative for hemorrhage or mass; chronic small vessel brain stem infarct, chest x-ray negative for acute pulmonary disease, CTA head and neck was negative for hemorrhage, ischemia or stenosis  Plan for brain MRI, EEG, echocardiogram   Given aspirin 162 mg p o  and Plavix 300 mg p o  in the ED  ? Continue stroke pathway with aspirin 162 mg p o  daily, atorvastatin 40 mg p o  Daily  ? Neurochecks, telemetry, EKG p r n   ? Obtain TSH, homocystine level, hemoglobin A1c  ?  PT/OT/speech therapy          Hypertensive urgency   Assessment & Plan     · Present upon admission elevated 212/107 possibly secondary to noncompliance with home medication in the setting of suspected TIA/CVA  ? Decrease blood pressure no greater than 20%  within 24 hours ~170/95  ? Hydralazine p r n  for SBP greater than 180 hold if less than 175  ? Encourage compliance  Continue home medication in the a m  carvedilol 25 mg b i d , nifedipine 90 mg q day, hydralazine 50 mg t i d   ? Hold home medication lisinopril due to acute kidney injury           Acute renal failure superimposed on stage 2 chronic kidney disease (HCC)   Assessment & Plan     · Present upon admission BUN/creatinine elevated 35/3 74 baseline 1 9-2 2 likely secondary to uncontrolled type 1 diabetes mellitus  ? Continue IVF normal saline at 100 mL/HR, monitor ins/outs, daily weights  ? Obtain UA with microscopy and urine creatinine  ? If BUN/creatinine continues to rise consider renal ultrasound for intrinsic causes/nephrology consultation  ? Follows outpatient nephrologist Dr Iftikhar Bill will need close follow-up outpatient           Type 1 diabetes mellitus with diabetic nephropathy, with long-term current use of insulin [facility])   Assessment & Plan     · Elevated  Hemoglobin A1c 8 9 July 2017 per patient BGs 400s in the a m  likely candidate for insulin pump however patient has resisted  ? Goal BG between 140-180 repeat hemoglobin A1c  ? Continue home medication Lantus 24 units b i d , add lispro 7 units t i d  with meals, insulin sliding scale, Accu-Cheks q i d , hypoglycemic protocol             VTE Prophylaxis: Will ambulate patient  / sequential compression device   Code Status:  Full code-discussed the patient at the bedside  POLST: POLST information provided but not completed  Discussion with family:  Discussed with patient and patient's wife via phone in regards to appropriate management of hypertensive urgency and type 1 diabetes mellitus     Anticipated Length of Stay:  Patient will be admitted on an Inpatient basis with an anticipated length of stay of  at least 2 midnights     Justification for Hospital Stay: Left conjugate gaze suspected TIA/CVA    Addendum:  After multiple attempts to decrease BP ease labetalol 40 mg IV, hydralazine 15 mg IV, nifedipine 30 mg p o , carvedilol 25 mg p o  patient blood pressures are refractory 214/104  At this time will initiate Cardene drip to titrate  In addition patient BGs continue to rise 477 was given Lantus 24 units given patient is actively vomiting we will initiate insulin drip  Upgrade level of care to step-down level 2    Critical care advance practitioner aware      Admission Orders:  Inpatient/StepDown  Continuous Cardiac Monitoring  Serial Cardiac Enzymes q3h x 3  Consult Neuro r/e CVA   Bilateral Sequential Compression Device  OT/PT Consult  Echocardiogram  MRI of Brain  Carotid Dopplar  EEG awake or drowsy  Dysphagia evaluation  Neuro Check q4h  Nasal O2 @ 2L  SSI  IV Cardene Drip  IV Insulin    Scheduled Meds:   aspirin 162 mg Oral Daily   atorvastatin 40 mg Oral QPM   docusate sodium 100 mg Oral BID   hydrALAZINE 50 mg Oral TID   nicotine 1 patch Transdermal Daily     IV Hydralazine 10 mg x 1 thus far  IV Hydralazine 5 mg x 1 thus far  IV Zofran 4 mg x 1 thus far

## 2018-01-23 NOTE — SPEECH THERAPY NOTE
Speech Language/Pathology  Speech/Language Pathology  Assessment    Patient Name: Massimo KONG'Z Date: 1/23/2018     Problem List  Patient Active Problem List   Diagnosis    Conjugate gaze palsy    Acute renal failure superimposed on stage 2 chronic kidney disease ([facility] [address] )    Hypertensive urgency    Type 1 diabetes mellitus with diabetic nephropathy, with long-term current use of insulin ([facility] [address] )    History of lacunar cerebrovascular accident (CVA)     Past Medical History  Past Medical History:   Diagnosis Date    Cerebellar stroke side undetermined 2015 2013    Diabetes type 1, controlled ([facility] [address] )     Hypertension      Past Surgical History  History reviewed  No pertinent surgical history  Pt is a 28 yo gentleman admitted to [facility] 1/22/17 for conjugate gaze palsy  pt w/ previous diagnosis of L lacunar infarct July 2015  Patient stated a sudden onset of blurred vision at the and occasional diplopia around 830 this morning  Patient attempted to get out of bed and had difficulty ambulating  Patient stated persistent double vision and reported to his mother who brought patient to the emergency department for further evaluation  Patient also reports persistent nausea without episodes of vomiting headache abdominal pain, diarrhea, constipation, fevers or chills  Patient admitted to nasal congestion with maxillary sinus pressure roughly 2 weeks ago which had resolved without over counter medication or prescriptions  Consult received for speech/swallow eval on stroke pathway  Pt passed nsg swallow screen; tolerating regular diet w/o s/s dysphagia or aspiration  No speech/language deficits reported  NIH score is 2 for gaze and visual deficits  No need for formal speech/swallow eval at this time  Reconsult if needed    Sandre Osgood, SLP

## 2018-01-23 NOTE — ED NOTES
SAV Horn notified of /96 HR 92  Pt currently on Cardene @ 2 5mg/hr   Instructed by Irais Castillo to maintain Cardene gtt at 2 5mg and increase gtt if BP >093 systolic     Sharri Cohen RN  01/22/18 7503

## 2018-01-23 NOTE — PLAN OF CARE
Problem: PAIN - ADULT  Goal: Verbalizes/displays adequate comfort level or baseline comfort level  Interventions:  - Encourage patient to monitor pain and request assistance  - Assess pain using appropriate pain scale  - Administer analgesics based on type and severity of pain and evaluate response  - Implement non-pharmacological measures as appropriate and evaluate response  - Consider cultural and social influences on pain and pain management  - Notify physician/advanced practitioner if interventions unsuccessful or patient reports new pain   Outcome: Progressing      Problem: INFECTION - ADULT  Goal: Absence or prevention of progression during hospitalization  INTERVENTIONS:  - Assess and monitor for signs and symptoms of infection  - Monitor lab/diagnostic results  - Monitor all insertion sites, i e  indwelling lines, tubes, and drains  - Monitor endotracheal (as able) and nasal secretions for changes in amount and color  - [place] appropriate cooling/warming therapies per order  - Administer medications as ordered  - Instruct and encourage patient and family to use good hand hygiene technique  - Identify and instruct in appropriate isolation precautions for identified infection/condition   Outcome: Progressing    Goal: Absence of fever/infection during neutropenic period  INTERVENTIONS:  - Monitor WBC  - Implement neutropenic guidelines   Outcome: Progressing      Problem: SAFETY ADULT  Goal: Patient will remain free of falls  INTERVENTIONS:  - Assess patient frequently for physical needs  -  Identify cognitive and physical deficits and behaviors that affect risk of falls    -  [place] fall precautions as indicated by assessment   - Educate patient/family on patient safety including physical limitations  - Instruct patient to call for assistance with activity based on assessment  - Modify environment to reduce risk of injury  - Consider OT/PT consult to assist with strengthening/mobility    Outcome: Progressing    Goal: Maintain or return to baseline ADL function  INTERVENTIONS:  -  Assess patient's ability to carry out ADLs; assess patient's baseline for ADL function and identify physical deficits which impact ability to perform ADLs (bathing, care of mouth/teeth, toileting, grooming, dressing, etc )  - Assess/evaluate cause of self-care deficits   - Assess range of motion  - Assess patient's mobility; develop plan if impaired  - Assess patient's need for assistive devices and provide as appropriate  - Encourage maximum independence but intervene and supervise when necessary  ¯ Involve family in performance of ADLs  ¯ Assess for home care needs following discharge   ¯ Request OT consult to assist with ADL evaluation and planning for discharge  ¯ Provide patient education as appropriate   Outcome: Progressing    Goal: Maintain or return mobility status to optimal level  INTERVENTIONS:  - Assess patient's baseline mobility status (ambulation, transfers, stairs, etc )    - Identify cognitive and physical deficits and behaviors that affect mobility  - Identify mobility aids required to assist with transfers and/or ambulation (gait belt, sit-to-stand, lift, walker, cane, etc )  - [place] fall precautions as indicated by assessment  - Record patient progress and toleration of activity level on Mobility SBAR; progress patient to next Phase/Stage  - Instruct patient to call for assistance with activity based on assessment  - Request Rehabilitation consult to assist with strengthening/weightbearing, etc    Outcome: Progressing      Problem: Knowledge Deficit  Goal: Patient/family/caregiver demonstrates understanding of disease process, treatment plan, medications, and discharge instructions  Complete learning assessment and assess knowledge base    Interventions:  - Provide teaching at level of understanding  - Provide teaching via preferred learning methods   Outcome: Progressing      Problem: Neurological Deficit  Goal: Neurological status is stable or improving  Interventions:  - Monitor and assess patient's level of consciousness, motor function, sensory function, and level of assistance needed for ADLs  - Monitor and report changes from baseline  Collaborate with interdisciplinary team to initiate plan and implement interventions as ordered  - Provide and maintain a safe environment  - Utilize seizure precautions  - Utilize fall precautions  - Utilize aspiration precautions  - Utilize bleeding precautions  Outcome: Progressing      Problem: Activity Intolerance/Impaired Mobility  Goal: Mobility/activity is maintained at optimum level for patient  Interventions:  - Assess and monitor patient  barriers to mobility and need for assistive/adaptive devices  - Assess patient's emotional response to limitations  - Collaborate with interdisciplinary team and initiate plans and interventions as ordered  - Encourage independent activity per ability   - Maintain proper body alignment  - Perform active/passive rom as tolerated/ordered  - Plan activities to conserve energy   - Turn patient   Outcome: Progressing      Problem: Communication Impairment  Goal: Ability to express needs and understand communication  Assess patient's communication skills and ability to understand information  Patient will demonstrate use of effective communication techniques, alternative methods of communication and understanding even if not able to speak  - Encourage communication and provide alternate methods of communication as needed  - Collaborate with case management/ for discharge needs  - Include patient/family/caregiver in decisions related to communication  Outcome: Completed Date Met: 01/23/18      Problem: Potential for Aspiration  Goal: Non-ventilated patient's risk of aspiration is minimized  Assess and monitor vital signs, respiratory status, and labs (WBC)    Monitor for signs of aspiration (tachypnea, cough, rales, wheezing, cyanosis, fever)  - Assess and monitor patient's ability to swallow  - Place patient up in chair to eat if possible  - HOB up at 90 degrees to eat if unable to get patient up into chair   - Supervise patient during oral intake  - Instruct patient to take small bites  - Instruct patient to take small single sips when taking liquids  - Follow patient-specific strategies generated by speech pathologist    Outcome: Completed Date Met: 01/23/18    Goal: Ventilated patient's risk of aspiration is minimized  Assess and monitor vital signs, respiratory status, airway cuff pressure, and labs (WBC)  Monitor for signs of aspiration (tachypnea, cough, rales, wheezing, cyanosis, fever)  - Elevate head of bed 30 degrees if patient has tube feeding   - Monitor tube feeding  Outcome: Completed Date Met: 01/23/18      Problem: Nutrition  Goal: Nutrition/Hydration status is improving  Monitor and assess patient's nutrition/hydration status for malnutrition (ex- brittle hair, bruises, dry skin, pale skin and conjunctiva, muscle wasting, smooth red tongue, and disorientation)  Collaborate with interdisciplinary team and initiate plan and interventions as ordered  Monitor patient's weight and dietary intake as ordered or per policy  Utilize nutrition screening tool and intervene per policy  Determine patient's food preferences and provide high-protein, high-caloric foods as appropriate  - Assist patient with eating   - Allow adequate time for meals   - Encourage patient to take dietary supplement as ordered  - Collaborate with clinical nutritionist   - Include patient/family/caregiver in decisions related to nutrition     Outcome: Completed Date Met: 01/23/18      Problem: Potential for Falls  Goal: Patient will remain free of falls  INTERVENTIONS:  - Assess patient frequently for physical needs  -  Identify cognitive and physical deficits and behaviors that affect risk of falls   -  [place] fall precautions as indicated by assessment   - Educate patient/family on patient safety including physical limitations  - Instruct patient to call for assistance with activity based on assessment  - Modify environment to reduce risk of injury  - Consider OT/PT consult to assist with strengthening/mobility    Outcome: Progressing      Problem: Prexisting or High Potential for Compromised Skin Integrity  Goal: Skin integrity is maintained or improved  INTERVENTIONS:  - Identify patients at risk for skin breakdown  - Assess and monitor skin integrity  - Assess and monitor nutrition and hydration status  - Monitor labs (i e  albumin)  - Assess for incontinence   - Turn and reposition patient  - Assist with mobility/ambulation  - Relieve pressure over bony prominences  - Avoid friction and shearing  - Provide appropriate hygiene as needed including keeping skin clean and dry  - Evaluate need for skin moisturizer/barrier cream  - Collaborate with interdisciplinary team (i e  Nutrition, Rehabilitation, etc )   - Patient/family teaching   Outcome: Completed Date Met: 01/23/18

## 2018-01-23 NOTE — ASSESSMENT & PLAN NOTE
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

## 2018-01-23 NOTE — PROGRESS NOTES
Received a call from radiologist regardin MRI  After review with other radiologists a punctate focus of restricted diffusion was noted in velia  The findings of demyelination do persist  Awaiting stabilization of renal function for MRI with contrast  Antiplatelets on hold for now for LP planned 1/26

## 2018-01-23 NOTE — CONSULTS
This 57-year-old gentleman presented to the LT[facility], located within [facility] room with new onset stroke symptoms including inability to look to the left and trouble walking  He also had nausea and vomiting  His medical history includes diabetes and hypertension with nephropathy  He [place] and spect[place]  He denies eye surgery of any kind  The patient is wearing a patch over his left eye at present for comfort  He complains of blurry vision  On examination visual acuity without correction at near is 20/20 both eyes  Pupils are sluggish and 2 5 mm  No afferent pupil defect is noted  Evaluation of the extraocular muscles shows a left lateral gaze palsy  Also noted is nystagmus in several gazes  Confrontation visual fields are full both eyes  ( a right sided field defect was noted by neurology  I did not find this on my exam ) The penlight exam is unremarkable  Intra-ocular pressures are 16 both eyes  The patient's eyes were dilated at 12:30 p m  with Mydriacyl  The lens and vitreous cavities are clear  The fundus examination shows occasional blot hemorrhages in the right eye and some peripapillary exudates in the left eye  Impression I agree with the Neurology note  The patient 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

## 2018-01-23 NOTE — PROGRESS NOTES
Progress Note Ana Aguirre 1985, 27 y o  male MRN: [id number]    Unit/Bed#:  Encounter: [id number]    Primary Care Provider: Andreea Cardozo DO   Date and time admitted to hospital: 1/22/2018 10:13 AM        Type 1 diabetes mellitus with diabetic nephropathy, with long-term current use of insulin ([facility])   Assessment & Plan    · Glucose stabilized  · A1c is 6 4  · Continue basal/bolus insulin        Hypertensive urgency   Assessment & Plan    · Continue coreg, hydarlazine, nicardipine drip        Acute renal failure superimposed on stage 2 chronic kidney disease ([facility] [address] )   Assessment & Plan    · Present upon admission BUN/creatinine elevated 35/3 74 baseline 1 9-2 2 likely secondary to uncontrolled type 1 diabetes mellitus  · Continue IVF normal saline at 100 mL/HR, monitor ins/outs, daily weights  · Significant proteinuria noted on UA  · Oliguric, monitor serial bladder scans, straight cath PRN x3  · Check renal ultrasound  · Await CK level  · Consult with nephrology        * Gaze palsy   Assessment & Plan    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 risk factors include hypertensive urgency fairly uncontrolled type 1 diabetes mellitus and significant smoker half to 1/2 packs a day  · Possible demyelination noted on MRI  · Will need contrast MRI when stable and possible LP  · Outpatient follow up with opthalmology for retinopathy  · Titrate nicardipine drip to ensure gradual reduction in BP  · Patching eyes q2h          VTE Pharmacologic Prophylaxis:   Pharmacologic: Heparin  Mechanical VTE Prophylaxis in Place: Yes    Patient Centered Rounds: I have performed bedside rounds with nursing staff today      Education and Discussions with Family / Patient: patient states he will update parents    Time Spent for Care: 20 minutes  More than 50% of total time spent on counseling and coordination of care as described above  Current Length of Stay: 1 day(s)    Current Patient Status: Inpatient   Certification Statement: The patient will continue to require additional inpatient hospital stay due to hypertensive urgency    Discharge Plan: TBD    Code Status: Level 1 - Full Code      Subjective:   No change in vision  Denies headache, chest pain, SOB, focal weakness  Objective:     Vitals:   Temp (24hrs), Av 4 °F (36 9 °C), Min:98 1 °F (36 7 °C), Max:98 7 °F (37 1 °C)    HR:  [82-98] 90  Resp:  [17-20] 20  BP: (160-212)/() 184/103  SpO2:  [93 %-100 %] 98 %  Body mass index is 28 88 kg/m²  Input and Output Summary (last 24 hours): Intake/Output Summary (Last 24 hours) at 18 1509  Last data filed at 18 1400   Gross per 24 hour   Intake          2169 75 ml   Output             1040 ml   Net          1129 75 ml       Physical Exam:     Physical Exam   Constitutional: He is oriented to person, place, and time  He appears well-developed and well-nourished  HENT:   Head: Normocephalic and atraumatic  Mouth/Throat: No oropharyngeal exudate  Eyes:   Pupils 4mm equal, sluggish to sligh, left gaze palsy   Neck: Neck supple  No JVD present  Cardiovascular: Normal rate and regular rhythm  Pulmonary/Chest: Effort normal  He has no wheezes  He has no rales  Musculoskeletal: Normal range of motion  He exhibits no edema or tenderness  Neurological: He is alert and oriented to person, place, and time  Skin: Skin is warm and dry  Psychiatric: He has a normal mood and affect   His behavior is normal        Additional Data:     Labs:      Results from last 7 days  Lab Units 18  0450   WBC Thousand/uL 8 75   HEMOGLOBIN g/dL 9 1*   HEMATOCRIT % 26 8*   PLATELETS Thousands/uL 235       Results from last 7 days  Lab Units 01/23/18  0450   SODIUM mmol/L 139   POTASSIUM mmol/L 4 2   CHLORIDE mmol/L 108   CO2 mmol/L 21   BUN mg/dL 43*   CREATININE mg/dL 4 57*   CALCIUM mg/dL 8 3   TOTAL PROTEIN g/dL 5 4*   BILIRUBIN TOTAL mg/dL 0 30   ALK PHOS U/L 75   ALT U/L 16   AST U/L 15   GLUCOSE RANDOM mg/dL 86       Results from last 7 days  Lab Units 01/23/18  0450   INR  0 95       * I Have Reviewed All Lab Data Listed Above  * Additional Pertinent Lab Tests Reviewed: All Labs Within Last 24 Hours Reviewed    Imaging:    Imaging Reports Reviewed Today Include: MRI    Recent Cultures (last 7 days):           Last 24 Hours Medication List:     atorvastatin 40 mg Oral QPM   docusate sodium 100 mg Oral BID   hydrALAZINE 50 mg Oral TID   nicotine 1 patch Transdermal Daily        Today, Patient Was Seen By: Mariaa Billings MD    ** Please Note: Dictation voice to text software may have been used in the creation of this document   **

## 2018-01-23 NOTE — ASSESSMENT & PLAN NOTE
· Present upon admission BUN/creatinine elevated 35/3 74 baseline 1 9-2 2 likely secondary to uncontrolled type 1 diabetes mellitus  · Continue IVF normal saline at 100 mL/HR, monitor ins/outs, daily weights  · Significant proteinuria noted on UA  · Oliguric, monitor serial bladder scans, straight cath PRN x3  · Check renal ultrasound  · Await CK level  · Consult with nephrology

## 2018-01-24 ENCOUNTER — APPOINTMENT (INPATIENT)
Dept: MRI IMAGING | Facility: HOSPITAL | Age: 33
DRG: 045 | End: 2018-01-24
Payer: COMMERCIAL

## 2018-01-24 LAB
25(OH)D3 SERPL-MCNC: 10.9 NG/ML (ref 30–100)
ALBUMIN SERPL BCP-MCNC: 2.2 G/DL (ref 3.5–5)
ALP SERPL-CCNC: 85 U/L (ref 46–116)
ALT SERPL W P-5'-P-CCNC: 15 U/L (ref 12–78)
ANION GAP SERPL CALCULATED.3IONS-SCNC: 11 MMOL/L (ref 4–13)
ANION GAP SERPL CALCULATED.3IONS-SCNC: 12 MMOL/L (ref 4–13)
AST SERPL W P-5'-P-CCNC: 16 U/L (ref 5–45)
BASOPHILS # BLD AUTO: 0.04 THOUSANDS/ΜL (ref 0–0.1)
BASOPHILS NFR BLD AUTO: 1 % (ref 0–1)
BILIRUB SERPL-MCNC: 0.2 MG/DL (ref 0.2–1)
BUN SERPL-MCNC: 47 MG/DL (ref 5–25)
BUN SERPL-MCNC: 47 MG/DL (ref 5–25)
CALCIUM SERPL-MCNC: 7.7 MG/DL (ref 8.3–10.1)
CALCIUM SERPL-MCNC: 8.1 MG/DL (ref 8.3–10.1)
CHLORIDE SERPL-SCNC: 106 MMOL/L (ref 100–108)
CHLORIDE SERPL-SCNC: 108 MMOL/L (ref 100–108)
CK MB SERPL-MCNC: 1.4 % (ref 0–2.5)
CK MB SERPL-MCNC: 2.6 NG/ML (ref 0–5)
CK SERPL-CCNC: 187 U/L (ref 39–308)
CO2 SERPL-SCNC: 20 MMOL/L (ref 21–32)
CO2 SERPL-SCNC: 21 MMOL/L (ref 21–32)
CREAT SERPL-MCNC: 6.8 MG/DL (ref 0.6–1.3)
CREAT SERPL-MCNC: 7.36 MG/DL (ref 0.6–1.3)
DEPRECATED AT III PPP: 103 % OF NORMAL (ref 92–136)
ENA RNP AB SER-ACNC: <0.2 AI (ref 0–0.9)
ENA SM AB SER-ACNC: <0.2 AI (ref 0–0.9)
EOSINOPHIL # BLD AUTO: 0.4 THOUSAND/ΜL (ref 0–0.61)
EOSINOPHIL NFR BLD AUTO: 5 % (ref 0–6)
ERYTHROCYTE [DISTWIDTH] IN BLOOD BY AUTOMATED COUNT: 13.9 % (ref 11.6–15.1)
FERRITIN SERPL-MCNC: 222 NG/ML (ref 8–388)
GFR SERPL CREATININE-BSD FRML MDRD: 10 ML/MIN/1.73SQ M
GFR SERPL CREATININE-BSD FRML MDRD: 9 ML/MIN/1.73SQ M
GLUCOSE SERPL-MCNC: 109 MG/DL (ref 65–140)
GLUCOSE SERPL-MCNC: 116 MG/DL (ref 65–140)
GLUCOSE SERPL-MCNC: 157 MG/DL (ref 65–140)
GLUCOSE SERPL-MCNC: 165 MG/DL (ref 65–140)
GLUCOSE SERPL-MCNC: 181 MG/DL (ref 65–140)
GLUCOSE SERPL-MCNC: 183 MG/DL (ref 65–140)
GLUCOSE SERPL-MCNC: 68 MG/DL (ref 65–140)
GLUCOSE SERPL-MCNC: 72 MG/DL (ref 65–140)
GLUCOSE SERPL-MCNC: 95 MG/DL (ref 65–140)
HCT VFR BLD AUTO: 26 % (ref 36.5–49.3)
HCV AB SER QL: NORMAL
HGB BLD-MCNC: 8.9 G/DL (ref 12–17)
IRON SATN MFR SERPL: 28 %
IRON SERPL-MCNC: 44 UG/DL (ref 65–175)
LYMPHOCYTES # BLD AUTO: 1.6 THOUSANDS/ΜL (ref 0.6–4.47)
LYMPHOCYTES NFR BLD AUTO: 19 % (ref 14–44)
MCH RBC QN AUTO: 30.9 PG (ref 26.8–34.3)
MCHC RBC AUTO-ENTMCNC: 34.2 G/DL (ref 31.4–37.4)
MCV RBC AUTO: 90 FL (ref 82–98)
MONOCYTES # BLD AUTO: 0.56 THOUSAND/ΜL (ref 0.17–1.22)
MONOCYTES NFR BLD AUTO: 7 % (ref 4–12)
NEUTROPHILS # BLD AUTO: 5.86 THOUSANDS/ΜL (ref 1.85–7.62)
NEUTS SEG NFR BLD AUTO: 68 % (ref 43–75)
PHOSPHATE SERPL-MCNC: 6.5 MG/DL (ref 2.7–4.5)
PLATELET # BLD AUTO: 222 THOUSANDS/UL (ref 149–390)
PMV BLD AUTO: 10.6 FL (ref 8.9–12.7)
POTASSIUM SERPL-SCNC: 4.4 MMOL/L (ref 3.5–5.3)
POTASSIUM SERPL-SCNC: 5 MMOL/L (ref 3.5–5.3)
PROT SERPL-MCNC: 5.1 G/DL (ref 6.4–8.2)
RBC # BLD AUTO: 2.88 MILLION/UL (ref 3.88–5.62)
RHEUMATOID FACT SER QL LA: NEGATIVE
SODIUM SERPL-SCNC: 138 MMOL/L (ref 136–145)
SODIUM SERPL-SCNC: 140 MMOL/L (ref 136–145)
TIBC SERPL-MCNC: 160 UG/DL (ref 250–450)
WBC # BLD AUTO: 8.46 THOUSAND/UL (ref 4.31–10.16)

## 2018-01-24 PROCEDURE — 70551 MRI BRAIN STEM W/O DYE: CPT

## 2018-01-24 PROCEDURE — 99232 SBSQ HOSP IP/OBS MODERATE 35: CPT | Performed by: PHYSICIAN ASSISTANT

## 2018-01-24 PROCEDURE — G8978 MOBILITY CURRENT STATUS: HCPCS

## 2018-01-24 PROCEDURE — 83540 ASSAY OF IRON: CPT | Performed by: INTERNAL MEDICINE

## 2018-01-24 PROCEDURE — 85613 RUSSELL VIPER VENOM DILUTED: CPT | Performed by: INTERNAL MEDICINE

## 2018-01-24 PROCEDURE — 97163 PT EVAL HIGH COMPLEX 45 MIN: CPT

## 2018-01-24 PROCEDURE — 82306 VITAMIN D 25 HYDROXY: CPT | Performed by: INTERNAL MEDICINE

## 2018-01-24 PROCEDURE — 84165 PROTEIN E-PHORESIS SERUM: CPT | Performed by: INTERNAL MEDICINE

## 2018-01-24 PROCEDURE — 85306 CLOT INHIBIT PROT S FREE: CPT | Performed by: INTERNAL MEDICINE

## 2018-01-24 PROCEDURE — 85705 THROMBOPLASTIN INHIBITION: CPT | Performed by: INTERNAL MEDICINE

## 2018-01-24 PROCEDURE — 82553 CREATINE MB FRACTION: CPT | Performed by: INTERNAL MEDICINE

## 2018-01-24 PROCEDURE — G8987 SELF CARE CURRENT STATUS: HCPCS

## 2018-01-24 PROCEDURE — 82728 ASSAY OF FERRITIN: CPT | Performed by: INTERNAL MEDICINE

## 2018-01-24 PROCEDURE — 97167 OT EVAL HIGH COMPLEX 60 MIN: CPT

## 2018-01-24 PROCEDURE — 80048 BASIC METABOLIC PNL TOTAL CA: CPT | Performed by: INTERNAL MEDICINE

## 2018-01-24 PROCEDURE — 86038 ANTINUCLEAR ANTIBODIES: CPT | Performed by: INTERNAL MEDICINE

## 2018-01-24 PROCEDURE — 81240 F2 GENE: CPT | Performed by: INTERNAL MEDICINE

## 2018-01-24 PROCEDURE — 72141 MRI NECK SPINE W/O DYE: CPT

## 2018-01-24 PROCEDURE — 84100 ASSAY OF PHOSPHORUS: CPT | Performed by: INTERNAL MEDICINE

## 2018-01-24 PROCEDURE — 85303 CLOT INHIBIT PROT C ACTIVITY: CPT | Performed by: INTERNAL MEDICINE

## 2018-01-24 PROCEDURE — 84166 PROTEIN E-PHORESIS/URINE/CSF: CPT | Performed by: INTERNAL MEDICINE

## 2018-01-24 PROCEDURE — G8979 MOBILITY GOAL STATUS: HCPCS

## 2018-01-24 PROCEDURE — 82550 ASSAY OF CK (CPK): CPT | Performed by: INTERNAL MEDICINE

## 2018-01-24 PROCEDURE — 86255 FLUORESCENT ANTIBODY SCREEN: CPT | Performed by: INTERNAL MEDICINE

## 2018-01-24 PROCEDURE — 81241 F5 GENE: CPT | Performed by: INTERNAL MEDICINE

## 2018-01-24 PROCEDURE — 85300 ANTITHROMBIN III ACTIVITY: CPT | Performed by: INTERNAL MEDICINE

## 2018-01-24 PROCEDURE — 85025 COMPLETE CBC W/AUTO DIFF WBC: CPT | Performed by: INTERNAL MEDICINE

## 2018-01-24 PROCEDURE — 86147 CARDIOLIPIN ANTIBODY EA IG: CPT | Performed by: INTERNAL MEDICINE

## 2018-01-24 PROCEDURE — G8988 SELF CARE GOAL STATUS: HCPCS

## 2018-01-24 PROCEDURE — 86146 BETA-2 GLYCOPROTEIN ANTIBODY: CPT | Performed by: INTERNAL MEDICINE

## 2018-01-24 PROCEDURE — 82948 REAGENT STRIP/BLOOD GLUCOSE: CPT

## 2018-01-24 PROCEDURE — 80053 COMPREHEN METABOLIC PANEL: CPT | Performed by: INTERNAL MEDICINE

## 2018-01-24 PROCEDURE — 85670 THROMBIN TIME PLASMA: CPT | Performed by: INTERNAL MEDICINE

## 2018-01-24 PROCEDURE — 72146 MRI CHEST SPINE W/O DYE: CPT

## 2018-01-24 PROCEDURE — 83550 IRON BINDING TEST: CPT | Performed by: INTERNAL MEDICINE

## 2018-01-24 PROCEDURE — 85732 THROMBOPLASTIN TIME PARTIAL: CPT | Performed by: INTERNAL MEDICINE

## 2018-01-24 PROCEDURE — 85305 CLOT INHIBIT PROT S TOTAL: CPT | Performed by: INTERNAL MEDICINE

## 2018-01-24 RX ORDER — FUROSEMIDE 10 MG/ML
60 INJECTION INTRAMUSCULAR; INTRAVENOUS ONCE
Status: COMPLETED | OUTPATIENT
Start: 2018-01-24 | End: 2018-01-24

## 2018-01-24 RX ORDER — HEPARIN SODIUM 5000 [USP'U]/ML
5000 INJECTION, SOLUTION INTRAVENOUS; SUBCUTANEOUS EVERY 8 HOURS SCHEDULED
Status: DISCONTINUED | OUTPATIENT
Start: 2018-01-24 | End: 2018-01-25

## 2018-01-24 RX ADMIN — ATORVASTATIN CALCIUM 40 MG: 40 TABLET, FILM COATED ORAL at 17:20

## 2018-01-24 RX ADMIN — HEPARIN SODIUM 5000 UNITS: 5000 INJECTION, SOLUTION INTRAVENOUS; SUBCUTANEOUS at 10:00

## 2018-01-24 RX ADMIN — CARVEDILOL 25 MG: 12.5 TABLET, FILM COATED ORAL at 07:52

## 2018-01-24 RX ADMIN — HYDRALAZINE HYDROCHLORIDE 50 MG: 25 TABLET, FILM COATED ORAL at 08:26

## 2018-01-24 RX ADMIN — DOCUSATE SODIUM 100 MG: 100 CAPSULE, LIQUID FILLED ORAL at 17:20

## 2018-01-24 RX ADMIN — CALCIUM ACETATE 667 MG: 667 CAPSULE ORAL at 11:06

## 2018-01-24 RX ADMIN — CALCIUM ACETATE 667 MG: 667 CAPSULE ORAL at 15:44

## 2018-01-24 RX ADMIN — HEPARIN SODIUM 5000 UNITS: 5000 INJECTION, SOLUTION INTRAVENOUS; SUBCUTANEOUS at 21:32

## 2018-01-24 RX ADMIN — DOCUSATE SODIUM 100 MG: 100 CAPSULE, LIQUID FILLED ORAL at 08:26

## 2018-01-24 RX ADMIN — HYDRALAZINE HYDROCHLORIDE 50 MG: 25 TABLET, FILM COATED ORAL at 15:24

## 2018-01-24 RX ADMIN — SODIUM CHLORIDE 100 ML/HR: 0.9 INJECTION, SOLUTION INTRAVENOUS at 04:50

## 2018-01-24 RX ADMIN — FUROSEMIDE 60 MG: 10 INJECTION, SOLUTION INTRAMUSCULAR; INTRAVENOUS at 11:06

## 2018-01-24 RX ADMIN — HYDRALAZINE HYDROCHLORIDE 50 MG: 25 TABLET, FILM COATED ORAL at 21:32

## 2018-01-24 RX ADMIN — SODIUM CHLORIDE 2.5 MG/HR: 0.9 INJECTION, SOLUTION INTRAVENOUS at 09:30

## 2018-01-24 RX ADMIN — INSULIN LISPRO 1 UNITS: 100 INJECTION, SOLUTION INTRAVENOUS; SUBCUTANEOUS at 11:08

## 2018-01-24 RX ADMIN — SODIUM BICARBONATE 100 ML/HR: 84 INJECTION, SOLUTION INTRAVENOUS at 10:30

## 2018-01-24 RX ADMIN — CARVEDILOL 25 MG: 12.5 TABLET, FILM COATED ORAL at 15:44

## 2018-01-24 RX ADMIN — HEPARIN SODIUM 5000 UNITS: 5000 INJECTION, SOLUTION INTRAVENOUS; SUBCUTANEOUS at 14:15

## 2018-01-24 RX ADMIN — INSULIN GLARGINE 12 UNITS: 100 INJECTION, SOLUTION SUBCUTANEOUS at 08:26

## 2018-01-24 NOTE — ASSESSMENT & PLAN NOTE
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

## 2018-01-24 NOTE — PLAN OF CARE
Problem: OCCUPATIONAL THERAPY ADULT  Goal: Performs self-care activities at highest level of function for planned discharge setting  See evaluation for individualized goals  Treatment Interventions: ADL retraining, Visual perceptual retraining, UE strengthening/ROM, Equipment evaluation/education, Compensatory technique education, Continued evaluation, Activityengagement, Patient/family training          See flowsheet documentation for full assessment, interventions and recommendations  Limitation: Decreased ADL status, Decreased endurance, Visual deficit, Decreased self-care trans, Decreased high-level ADLs     Assessment: Pt is a 26 yo male admitted [facility] 1/22/2018  Pt presents w/ gaze palsy and signficant PMH impacting his occupational performance including Type 1 DM, HTN, hx CVA  Pt reports living w/ his parents in [address] [place] [address] PTA  Pt reports I w/ ADL/IADL including driving  Upon evaluation, pt demonstrated B UE AROM WFL to complete ADL, and UE strength WFL  Pt reports right hand dominance  Pt completed bed mobility w/ S supine to sit at EOB  Pt completed UBD/LBD w/ S after set- up and increased time  Pt engaged in functional mobiltiy w/ CG/ min A using IV pole  Pt reports wearing glassess at baseline  Pt demonstrated difficulty stimuli on L and decreased tracking w/ left eye laterally and right eye medially  Therapist observed that eye movements were not smooth  Pt able to read time accurately from clock on wall, but reports blurry  Pt presents w/ decreased standing balance, decreased activity tolerance / endurance, decreased dynamic sitting balalnce, decreased vision, decreased bilateral fine motor coordination impacting his I w/ dressing, bathing, feeding, oral hygiene, functional mobility, functional transfers  Pt would benefit from OT while in acute care to address deficits  From an OT perspective, pt would benefit from post acute rehab when medically stable for discharge from acute care   Will continue to follow pt while in acute care     OT Discharge Recommendation: Other (Comment) (post acute rehab)  OT - OK to Discharge:  (to post acute rehab at this time when medically stable)

## 2018-01-24 NOTE — PLAN OF CARE
Problem: PAIN - ADULT  Goal: Verbalizes/displays adequate comfort level or baseline comfort level  Interventions:  - Encourage patient to monitor pain and request assistance  - Assess pain using appropriate pain scale  - Administer analgesics based on type and severity of pain and evaluate response  - Implement non-pharmacological measures as appropriate and evaluate response  - Consider cultural and social influences on pain and pain management  - Notify physician/advanced practitioner if interventions unsuccessful or patient reports new pain   Outcome: Progressing      Problem: INFECTION - ADULT  Goal: Absence or prevention of progression during hospitalization  INTERVENTIONS:  - Assess and monitor for signs and symptoms of infection  - Monitor lab/diagnostic results  - Monitor all insertion sites, i e  indwelling lines, tubes, and drains  - Monitor endotracheal (as able) and nasal secretions for changes in amount and color  - [place] appropriate cooling/warming therapies per order  - Administer medications as ordered  - Instruct and encourage patient and family to use good hand hygiene technique  - Identify and instruct in appropriate isolation precautions for identified infection/condition   Outcome: Progressing    Goal: Absence of fever/infection during neutropenic period  INTERVENTIONS:  - Monitor WBC  - Implement neutropenic guidelines   Outcome: Progressing      Problem: SAFETY ADULT  Goal: Patient will remain free of falls  INTERVENTIONS:  - Assess patient frequently for physical needs  -  Identify cognitive and physical deficits and behaviors that affect risk of falls    -  [place] fall precautions as indicated by assessment   - Educate patient/family on patient safety including physical limitations  - Instruct patient to call for assistance with activity based on assessment  - Modify environment to reduce risk of injury  - Consider OT/PT consult to assist with strengthening/mobility    Outcome: Progressing    Goal: Maintain or return to baseline ADL function  INTERVENTIONS:  -  Assess patient's ability to carry out ADLs; assess patient's baseline for ADL function and identify physical deficits which impact ability to perform ADLs (bathing, care of mouth/teeth, toileting, grooming, dressing, etc )  - Assess/evaluate cause of self-care deficits   - Assess range of motion  - Assess patient's mobility; develop plan if impaired  - Assess patient's need for assistive devices and provide as appropriate  - Encourage maximum independence but intervene and supervise when necessary  ¯ Involve family in performance of ADLs  ¯ Assess for home care needs following discharge   ¯ Request OT consult to assist with ADL evaluation and planning for discharge  ¯ Provide patient education as appropriate   Outcome: Progressing    Goal: Maintain or return mobility status to optimal level  INTERVENTIONS:  - Assess patient's baseline mobility status (ambulation, transfers, stairs, etc )    - Identify cognitive and physical deficits and behaviors that affect mobility  - Identify mobility aids required to assist with transfers and/or ambulation (gait belt, sit-to-stand, lift, walker, cane, etc )  - [place] fall precautions as indicated by assessment  - Record patient progress and toleration of activity level on Mobility SBAR; progress patient to next Phase/Stage  - Instruct patient to call for assistance with activity based on assessment  - Request Rehabilitation consult to assist with strengthening/weightbearing, etc    Outcome: Not Progressing      Problem: DISCHARGE PLANNING  Goal: Discharge to home or other facility with appropriate resources  INTERVENTIONS:  - Identify barriers to discharge w/patient and caregiver  - Arrange for needed discharge resources and transportation as appropriate  - Identify discharge learning needs (meds, wound care, etc )  - Arrange for interpretive services to assist at discharge as needed  - Refer to Case Management Department for coordinating discharge planning if the patient needs post-hospital services based on physician/advanced practitioner order or complex needs related to functional status, cognitive ability, or social support system   Outcome: Progressing      Problem: Knowledge Deficit  Goal: Patient/family/caregiver demonstrates understanding of disease process, treatment plan, medications, and discharge instructions  Complete learning assessment and assess knowledge base  Interventions:  - Provide teaching at level of understanding  - Provide teaching via preferred learning methods   Outcome: Progressing      Problem: Neurological Deficit  Goal: Neurological status is stable or improving  Interventions:  - Monitor and assess patient's level of consciousness, motor function, sensory function, and level of assistance needed for ADLs  - Monitor and report changes from baseline  Collaborate with interdisciplinary team to initiate plan and implement interventions as ordered  - Provide and maintain a safe environment  - Utilize seizure precautions  - Utilize fall precautions  - Utilize aspiration precautions  - Utilize bleeding precautions  Outcome: Progressing      Problem: Activity Intolerance/Impaired Mobility  Goal: Mobility/activity is maintained at optimum level for patient  Interventions:  - Assess and monitor patient  barriers to mobility and need for assistive/adaptive devices  - Assess patient's emotional response to limitations  - Collaborate with interdisciplinary team and initiate plans and interventions as ordered  - Encourage independent activity per ability   - Maintain proper body alignment  - Perform active/passive rom as tolerated/ordered    - Plan activities to conserve energy   - Turn patient   Outcome: Progressing      Problem: Potential for Falls  Goal: Patient will remain free of falls  INTERVENTIONS:  - Assess patient frequently for physical needs  -  Identify cognitive and physical deficits and behaviors that affect risk of falls  -  [place] fall precautions as indicated by assessment   - Educate patient/family on patient safety including physical limitations  - Instruct patient to call for assistance with activity based on assessment  - Modify environment to reduce risk of injury  - Consider OT/PT consult to assist with strengthening/mobility    Outcome: Progressing      Problem: Nutrition/Hydration-ADULT  Goal: Nutrient/Hydration intake appropriate for improving, restoring or maintaining nutritional needs  Monitor and assess patient's nutrition/hydration status for malnutrition (ex- brittle hair, bruises, dry skin, pale skin and conjunctiva, muscle wasting, smooth red tongue, and disorientation)  Collaborate with interdisciplinary team and initiate plan and interventions as ordered  Monitor patient's weight and dietary intake as ordered or per policy  Utilize nutrition screening tool and intervene per policy  Determine patient's food preferences and provide high-protein, high-caloric foods as appropriate       INTERVENTIONS:  - Monitor oral intake, urinary output, labs, and treatment plans  - Assess nutrition and hydration status and recommend course of action  - Evaluate amount of meals eaten  - Assist patient with eating if necessary   - Allow adequate time for meals  - Recommend/ encourage appropriate diets, oral nutritional supplements, and vitamin/mineral supplements  - Order, calculate, and assess calorie counts as needed  - Recommend, monitor, and adjust tube feedings and TPN/PPN based on assessed needs  - Assess need for intravenous fluids  - Provide specific nutrition/hydration education as appropriate  - Include patient/family/caregiver in decisions related to nutrition   Outcome: Progressing

## 2018-01-24 NOTE — PROGRESS NOTES
Progress Note - Neurology   Thelbert Bamberger Mulberger 27 y o  male MRN: [id number]  Unit/Bed#:  Encounter: [id number]    Assessment:  15-year-old male presenting with bilateral right gaze palsy  Neuroimaging obtained as patient presented as stroke alert, CTA of head and neck negative for acute large vessel disease  MRI of the brain (with and without contrast) was obtained evening of January 22nd, revealing diffusion abnormality in the posterior velia  Plan:  1  With new left facial droop, 7th cranial nerve findings and decreased facial sensation in left V3 territory, will recommend repeat MRI brain  This study will be obtained without contrast due to ongoing renal impairment  Will also recommend MRI of the cervical spine and thoracic spine to rule out further evidence of demyelinating disease  The studies as well will be performed without contrast due to renal dysfunction  2   Plan for LP on 01/26, holding antiplatelet secondary to this procedure  CSF orders are placed  3  Nephrology following closely with need for hemodialysis/potential for nephrogenic systemic sclerosis, with gadolinium administration on 1/22  4   Continued metabolic and infectious derangement monitoring and correction per primary team   5   Supportive care  6   Therapies following  7   Discussed plan of care with attending neurologist   Continue to monitor neurologic exam closely    Subjective:   Patient resting in bedside chair, doing okay today  Subjectively notes may be minimal improvement with vision changes, still with persistent right gaze preference with inability to cross midline  Upon personal examination today and per discussion with family, noticed new onset of left facial droop of cranial nerve findings/light touch deficits on the left which are new today compared to neuro evaluation yesterday    ROS:  Affirms vision changes, gaze palsy    Denies speech changes, focal weakness or sensory changes, chest pain, shortness of, abdominal pain  Vitals: Blood pressure (!) 175/95, pulse 82, temperature 98 6 °F (37 °C), temperature source Oral, resp  rate 18, height 5' 10" (1 778 m), weight 91 3 kg (201 lb 4 5 oz), SpO2 98 %  ,Body mass index is 28 88 kg/m²  Physical Exam:   Physical Exam   Constitutional: He is oriented to person, place, and time  He appears well-developed and well-nourished  HENT:   Head: Normocephalic and atraumatic  Eyes: Conjunctivae are normal  Pupils are equal, round, and reactive to light  L gaze palsy   Neck: Normal range of motion  Neck supple  Cardiovascular: Normal rate and regular rhythm  Pulmonary/Chest: Effort normal and breath sounds normal    Abdominal: Soft  Bowel sounds are normal    Musculoskeletal: Normal range of motion  Neurological: He is alert and oriented to person, place, and time  He has a normal Finger-Nose-Finger Test and a normal Heel to [employer]    Reflex Scores:       Patellar reflexes are 4+ on the right side and 4+ on the left side  Skin: Skin is warm and dry  Psychiatric: His speech is normal       Neurologic Exam     Mental Status   Oriented to person, place, and time  Follows 2 step commands  Attention: normal  Concentration: normal    Speech: speech is normal   Knowledge: good  Able to read  Able to repeat  Normal comprehension  Cranial Nerves     CN II   Visual fields full to confrontation  CN III, IV, VI   Pupils are equal, round, and reactive to light  CN V   Left facial sensation deficit: Diminished V3 sensation light touch/pinprick compared to left  CN VII   Left facial weakness: Left facial droop noted, decreased eyelid closure with left compared to right, a symmetric eyebrow raise (right greater than left)  Motor Exam   Right arm pronator drift: absent  Left arm pronator drift: absent  5/5 UE and LE throughout bilaterally        Sensory Exam   Light touch normal      Gait, Coordination, and Reflexes     Coordination   Finger to nose coordination: normal  Heel to shin coordination: normal    Tremor   Resting tremor: absent  Intention tremor: absent    Reflexes   Right patellar: 4+  Left patellar: 4+  Right plantar: upgoing  Left plantar: normal  Right ankle clonus: present (Few beats nonsustained)  Left ankle clonus: absent      Lab, Imaging and other studies:   US kidney/bladder 1/23/18: Normal sonographic appearance of the kidneys    CBC:   Results from last 7 days  Lab Units 01/24/18  0508 01/23/18  0450 01/22/18  1015   WBC Thousand/uL 8 46 8 75 8 24   RBC Million/uL 2 88* 2 96* 3 42*   HEMOGLOBIN g/dL 8 9* 9 1* 10 5*   HEMATOCRIT % 26 0* 26 8* 30 8*   MCV fL 90 91 90   PLATELETS Thousands/uL 222 235 275   , BMP/CMP:   Results from last 7 days  Lab Units 01/24/18  0508 01/23/18  0450 01/22/18  1942 01/22/18  1015   SODIUM mmol/L 140 139 136 136   POTASSIUM mmol/L 4 4 4 2 4 8 5 3   CHLORIDE mmol/L 108 108 105 105   CO2 mmol/L 20* 21 24 22   ANION GAP mmol/L 12 10 7 9   BUN mg/dL 47* 43* 43* 35*   CREATININE mg/dL 6 80* 4 57* 4 31* 3 74*   GLUCOSE RANDOM mg/dL 72 86 392* 360*   CALCIUM mg/dL 8 1* 8 3 8 7 8 9   AST U/L  --  15  --  16   ALT U/L  --  16  --  19   ALK PHOS U/L  --  75  --  89   TOTAL PROTEIN g/dL  --  5 4*  --  6 2*   BILIRUBIN TOTAL mg/dL  --  0 30  --  0 40   EGFR ml/min/1 73sq m 10 16 17 20   , Vitamin B12:   , HgBA1C:   Results from last 7 days  Lab Units 01/23/18  0450   HEMOGLOBIN A1C % 6 4*   , TSH:   Results from last 7 days  Lab Units 01/23/18  0450   TSH 3RD GENERATON uIU/mL 2 208   , Coagulation:   Results from last 7 days  Lab Units 01/23/18  0450   INR  0 95   , Lipid Profile:   Results from last 7 days  Lab Units 01/23/18 0450   HDL mg/dL 38*   CHOLESTEROL mg/dL 215*   LDL CALC mg/dL 144*   TRIGLYCERIDES mg/dL 165*   , Ammonia:   , Urinalysis:   Results from last 7 days  Lab Units 01/23/18  0218   COLOR UA  Yellow   CLARITY UA  Clear   SPEC GRAV UA  1 020   PH UA  6 0   LEUKOCYTES UA  Elevated glucose may cause decreased leukocyte values  See urine microscopic for [facility] result/*   NITRITE UA  Negative   PROTEIN UA mg/dl >=300*   GLUCOSE UA mg/dl >=1000 (1%)*   KETONES UA mg/dl Negative   BILIRUBIN UA  Negative   BLOOD UA  Trace-Intact*   , Medication Drug Levels:       Invalid input(s): CARBAMAZEPINE,  PHENOBARB, LACOSAMIDE, OXCARBAZEPINE  VTE Prophylaxis: Sequential compression device (Venodyne)  and Heparin    Counseling / Coordination of Care  Total time spent today 30 minutes  Greater than 50% of total time was spent with the patient and / or family counseling and / or coordination of care   A description of the counseling / coordination of care: Discussed plan of care with patient/family and primary team

## 2018-01-24 NOTE — PROCEDURES
Procedures by Timoteo Rader MD  at 2018  1:13 PM      Author:  Timoteo Rader MD Service:  Neurology Author Type:  Physician    Filed:  2018  1:28 PM Date of Service:  2018  1:13 PM Status:  Signed    :  Timoteo Rader MD (Physician)        Procedure Orders:       1  EEG awake or drowsy routine [[id number]] ordered by Jared Dougherty at 18 1427                  ELECTROENCEPHALOGRAM (EEG)        Patient Name: Jeanie Kline  MRN: [id number]   :  1985 File #: [id number]    Age: 27 y o  Encounter #: [id number]   Date performed:par18      par par par par par par par par par par par par par par par par par par par par par par par par par par par par par par   Report date: 2018 par par par par par par par par par par par par par par par par par par par par par par par par par par par par par     Study type: Routine EEG     ICD 10 diagnosis: Spells/Fit NOS R56 9     Start time: 1610 End time:       -------------------------------------------------------------------------------------------------------------------   Patient History:  27year old male admitted through the emergency room on 18 because of symptoms of dizziness and nausea  Systolic blood pressure was 230 on arrival  Routine EEG is being done to check for seizures because bilateral eye deviation was observed   Reportedly  has a history of stroke that occurred in 2015       Medications include:   atorvastatin 40 mg Oral QPM   docusate sodium 100 mg Oral BID   hydrALAZINE 50 mg Oral TID   nicotine 1 patch Transdermal Daily         -------------------------------------------------------------------------------------------------------------------par  Description of Procedure:  ·  32 channel digital recording with electrodes placed according to the International 10-20 system with additional T1/T2 electrodes, EOG, EKG,parand simultaneous  video  A monitoring technologist supervised the continuous recording  The recording was technically satisfactory      -------------------------------------------------------------------------------------------------------------------   Results:   Background Activity:   During wakefulness, background activity consists of continuous, well-formed, normal voltage, posteriorly dominant, symmetric, reactive 10 HZ alpha activity with AP gradient present      Activation Procedures:   Hyperventilation was not performed      Stepped photic stimulation between 1-30 cps was performed and did not alter the tracing  Abnormal Findings:     No focal abnormalities nor interictal epileptiform discharges were noted  No electrographic seizures occurred during this recording      Other findings: The single lead EKG demonstrated a regular rhythm      Events:   No push button events occurred during this study      -------------------------------------------------------------------------------------------------------------------par  Interpretation: This is a normal 31 minute EEG        Scribe Attestation:  Documented by Cheyanne Fontaine, acting as a scribe for Dr Latoay Weiss on 1/23/18 at 1:27 PM      Physician Attestation:  All documentation recorded by the scribe accurately reflects the service I personally performed and the decisions made by me  I was present during the time the encounter was recorded and have reviewed all the documentation and confirm that it is  all accurate and representative of the encounter        Ana Duncan, [address] [facility]  Pager # [phone]    Jan 23 2018  1:29PM [facility] Standard Time

## 2018-01-24 NOTE — PHYSICAL THERAPY NOTE
PHYSICAL THERAPY EVALUATION  NAME:  Swati Mckeon  DATE: 01/24/18    AGE:   27 y o  Mrn:   [id number]  ADMIT DX:  Dizziness [R42]  Blurred vision [H53 8]  Nausea [R11 0]  Hypertension [I10]  CVA (cerebral vascular accident) ([facility] ) [I63 9]  Chronic kidney disease [N18 9]  Diabetes mellitus type 1 ([facility] ) [E10 9]  C[facility]brovascular [facility] ([facility] unspecified [facility] [I63 9]    Past Medical History:   Diagnosis Date    Cerebellar stroke side undetermined 2015 2013    Diabetes type 1, controlled ([facility] )     Hypertension      Length Of Stay: 2  Performed at least 2 patient identifiers during session: Name and wristband    PHYSICAL THERAPY EVALUATION :    01/24/18 0824   Note Type   Note type Eval only   Pain Assessment   Pain Assessment No/denies pain   Home Living   Type of [address] Bed/bath upstairs; Two level  (flight in home w/ railng, 2 NIRU)   Bathroom Shower/Tub Tub/shower unit   [address] [place] [address] (none)   Prior Function   Level of [place] Independent with ADLs and functional mobility   Lives With Family  (parents)   ADL Assistance Independent   IADLs (drives)   Falls in the last 6 months 0   Restrictions/Precautions   Other Precautions Fall Risk;Visual impairment;Telemetry;Multiple lines   General   Family/Caregiver Present No   Cognition   Overall Cognitive Status WFL   Arousal/Participation Alert   Orientation Level Oriented X4   Following Commands Follows all commands and directions without difficulty   Comments Limited facial movement on L side, including smile, eye closing, pucker   No swalloing difficulty   RUE Strength   RUE Overall Strength Within Functional Limits - able to perform ADL tasks with strength   LUE Strength   LUE Overall Strength Within Functional Limits - able to perform ADL tasks with strength   Strength RLE   R Hip Flexion 4+/5   R Knee Extension 4+/5   R Ankle Dorsiflexion 4+/5   Strength LLE   L Hip Flexion 4+/5   L Knee Extension 4+/5   L Ankle Dorsiflexion 4+/5   Coordination   Movements are Fluid and Coordinated 0   Coordination and Movement Description tremors/dysmetria   Sensation (limited vision B eyes to L field, +nystagmus w/R gaze)   Light Touch   RLE Light Touch Grossly intact   LLE Light Touch Grossly intact   Bed Mobility   Supine to Sit 5  Supervision   Additional items Assist x 1;HOB elevated   Transfers   Sit to Stand 5  Supervision   Additional items Assist x 1;Verbal cues   Stand to Sit 5  Supervision   Additional items Assist x 1;Verbal cues   Ambulation/Elevation   Gait pattern Excessively slow; Ataxia; Inconsistent kailee  (mild ataxia, inconsistent w/obstacle mgmt on L)   Gait Assistance 4  Minimal assist   Additional items Assist x 1;Verbal cues  (for obstacle management; second A for IV/DASH management)   Assistive Device (using IV pole for support)   Distance 100'   Stair Management Assistance Not tested   Balance   Static Sitting Good   Static Standing Fair -   Ambulatory Poor +   Endurance Deficit   Endurance Deficit Yes   Endurance Deficit Description limited amb distance   Activity Tolerance   Activity Tolerance Patient limited by fatigue   Medical Staff Made Aware spoke to HIEU Davidson OT, and renal   Nurse Made Aware spoke to Kat Jeronimo RN   Assessment   Prognosis Good   Problem List Decreased strength;Decreased endurance; Impaired balance;Decreased mobility; Decreased coordination; Impaired vision   Barriers to Discharge Inaccessible home environment  (NIRU And stairs inside of home)   Goals   Patient Goals to move better   STG Expiration Date 02/03/18   Short Term Goal #1 Pt will perform transfers consistent modified I, amb w/+/- cane for > 150' w/o uncorrected losses of balance, perform TUG And DGI with    Treatment Day 0   Plan   Treatment/Interventions Functional transfer training;LE strengthening/ROM; Elevations; Therapeutic exercise;Equipment eval/education; Bed mobility;Gait training;Patient/family training;Cognitive reorientation; Endurance training;Spoke to nursing   PT Frequency 5x/wk   Recommendation   Recommendation Post acute IP rehab  (unless pt progresses to more indep level of function)   Equipment Recommended Other (Comment)  (debra LEGER)   Additional Comments unless pt progresses to more indep level of function, and is able to perform stairs  Barthel Index   Feeding 5   Bathing 0   Grooming Score 5   Dressing Score 5   Bladder Score 10   Bowels Score 10   Toilet Use Score 5   Transfers (Bed/Chair) Score 10   Mobility (Level Surface) Score 0   Stairs Score 0   Barthel Index Score 50   (Please find full objective findings from PT assessment regarding body systems outlined above)  Pt requires PT /OT co-eval due to signficant assistance with mobility and cognitive-behavioral impairments  Assessment: Pt is a 27 y o  male seen for PT evaluation s/p admit to [address] [place] [address] on 1/22/2018 w/ CVA (cerebral vascular accident) ([facility] )  Pt in Critical care area  Order placed for PT  Prior to admission, pt was independent w/ all functional mobility w/ o device, lived in multi-level home and lived with parents  Upon evaluation: Pt requires supervision assistance for bed mobility and transfers and minimal assistance for transfers and ambulation with unilateral support of IV Pole  Pt's clinical presentation is currently unstable/unpredictable given the functional mobility deficits above, especially impaired balance, decreased endurance, gait deviations, decreased functional mobility tolerance, fall risk and facial weakness, limited vision, combined with medical complications of hypertension  and abnormal renal lab values  Pt to benefit from continued skilled PT tx while in hospital and upon DC to address deficits as defined above and maximize level of functional mobility   From PT/mobility standpoint, recommendation at time of d/c would be inpatient rehab pending progress in order to maximize pt's functional independence and consistency w/ mobility in order to facilitate return to PLOF  Recommend trial with cane next 1-2 sessions, ther ex next 1-2 session, OT consult and case management consult  The following objective measures were performed on IE: Barthel Index 50/100; Modified Adams 4 (moderate/severe disability)  Comorbidities affecting pt's physical performance at time of assessment include:smoker, HTN and CVA  Personal factors affecting pt at time of IE include: hx of non-compliance, steps to enter environment, multi-level environment and inability to perform current job functions    Phillip Carlson, PT, DPT

## 2018-01-24 NOTE — PROGRESS NOTES
Received page by a on-call Neurology in regards to appropriate imaging for which brain MRI without contrast was ordered on 01/22/2018 at 14:35 however was canceled and a modification order at 20:55 with contrast gadobutrol administered under no cosign sign per protocol  Spoke with critical care advance practitioner in regards to administration of gadobutrol to discuss with Nephrology in regards to appropriate treatment

## 2018-01-24 NOTE — PROGRESS NOTES
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 risk factors include hypertensive urgency fairly uncontrolled type 1 diabetes mellitus and significant smoker half to 1/2 packs a day  · Punctate CVA in [place] noted on MRI (see addendum from yesterday)  · Possible demyelination noted on MRI  · Will need contrast MRI when stable and possible LP  · Outpatient follow up with opthalmology for retinopathy  · Titrate nicardipine drip to ensure gradual reduction in BP  · Patching eyes q2h  · Continue statin  · Antiplatelets held for LP on the 26th  · PT/OT following        Acute renal failure superimposed on stage 2 chronic kidney disease ([facility] )   Assessment & Plan    · Present upon admission, baseline 1 9-2 2 likely secondary to uncontrolled type 1 diabetes mellitus  · Suspect ARF due to contrast induced nephropathy  · Due to decreased serum bicarb, will start a 1/2 NS bicarb infusion  · Significant proteinuria noted on UA  · Oliguric, monitor serial bladder scans  · Renal ultrasound is unremarkbale  · Change to low phos, low K diet  · Nephrology following        Hypertensive urgency   Assessment & Plan    · Likely due to CVA  · Continue coreg, hydarlazine, nicardipine drip, BP titrating down as recomended        Type 1 diabetes mellitus with diabetic nephropathy, with long-term current use of insulin ([facility])   Assessment & Plan    · Glucose stabilized  · A1c is 6 4  · Stop IV insulin infusion and change to basal/bolus            VTE Pharmacologic Prophylaxis:   Pharmacologic: Heparin  Mechanical VTE Prophylaxis in Place: Yes    Patient Centered Rounds: I have performed bedside rounds with nursing staff today  Discussions with Specialists or Other Care Team Provider: nephrology    Education and Discussions with Family / Patient: patient, plan of care    Time Spent for Care: 20 minutes  More than 50% of total time spent on counseling and coordination of care as described above  Current Length of Stay: 2 day(s)    Current Patient Status: Inpatient   Certification Statement: The patient will continue to require additional inpatient hospital stay due to ARF    Discharge Plan: home when stable    Code Status: Level 1 - Full Code      Subjective:   Reports slight improvement with vision  Denies SOB, edema, pain, headache  Objective:     Vitals:   Temp (24hrs), Av 2 °F (36 8 °C), Min:97 9 °F (36 6 °C), Max:98 6 °F (37 °C)    HR:  [82-94] 88  Resp:  [18-20] 18  BP: (156-204)/() 177/91  SpO2:  [93 %-100 %] 98 %  Body mass index is 28 88 kg/m²  Input and Output Summary (last 24 hours): Intake/Output Summary (Last 24 hours) at 18 0912  Last data filed at 18 0330   Gross per 24 hour   Intake           2343 2 ml   Output              670 ml   Net           1673 2 ml       Physical Exam:     Physical Exam   Constitutional: He is oriented to person, place, and time  He appears well-developed and well-nourished  HENT:   Head: Normocephalic and atraumatic  Mouth/Throat: Oropharynx is clear and moist    Eyes: Pupils are equal, round, and reactive to light  No scleral icterus  Left gaze palsy   Neck: Neck supple  No JVD present  Cardiovascular: Normal rate and regular rhythm  Pulmonary/Chest: Effort normal and breath sounds normal  He has no wheezes  He has no rales  Abdominal: Soft  He exhibits no distension  There is no tenderness     Musculoskeletal: He exhibits no edema or tenderness  Neurological: He is alert and oriented to person, place, and time  A cranial nerve deficit is present  Strength in UE and LE is 5/5 equal bilateral   Skin: Skin is warm and dry  Psychiatric: He has a normal mood and affect  His behavior is normal          Additional Data:     Labs:      Results from last 7 days  Lab Units 01/24/18  0508   WBC Thousand/uL 8 46   HEMOGLOBIN g/dL 8 9*   HEMATOCRIT % 26 0*   PLATELETS Thousands/uL 222   NEUTROS PCT % 68   LYMPHS PCT % 19   MONOS PCT % 7   EOS PCT % 5       Results from last 7 days  Lab Units 01/24/18  0508 01/23/18  0450   SODIUM mmol/L 140 139   POTASSIUM mmol/L 4 4 4 2   CHLORIDE mmol/L 108 108   CO2 mmol/L 20* 21   BUN mg/dL 47* 43*   CREATININE mg/dL 6 80* 4 57*   CALCIUM mg/dL 8 1* 8 3   TOTAL PROTEIN g/dL  --  5 4*   BILIRUBIN TOTAL mg/dL  --  0 30   ALK PHOS U/L  --  75   ALT U/L  --  16   AST U/L  --  15   GLUCOSE RANDOM mg/dL 72 86       Results from last 7 days  Lab Units 01/23/18  0450   INR  0 95       * I Have Reviewed All Lab Data Listed Above  * Additional Pertinent Lab Tests Reviewed: All Labs Within Last 24 Hours Reviewed    Imaging:    Imaging Reports Reviewed Today Include: MRI    Recent Cultures (last 7 days):           Last 24 Hours Medication List:     atorvastatin 40 mg Oral QPM   carvedilol 25 mg Oral BID With Meals   docusate sodium 100 mg Oral BID   hydrALAZINE 50 mg Oral TID   insulin glargine 12 Units Subcutaneous Q12H [facility]   insulin lispro 1-6 Units Subcutaneous TID AC   insulin lispro 6 Units Subcutaneous TID With Meals        Today, Patient Was Seen By: Adonay Molina MD    ** Please Note: Dictation voice to text software may have been used in the creation of this document   **

## 2018-01-24 NOTE — OCCUPATIONAL THERAPY NOTE
[address] Evaluation     Patient Name: Tiffani Winter  VHOAJ'D Date: 1/24/2018  Problem List  Patient Active Problem List   Diagnosis    [place] CVA (cerebral vascular accident) ([facility]    Acute renal failure superimposed on stage 2 chronic kidney disease ([facility] )    Hypertensive urgency    Type 1 diabetes mellitus with diabetic nephropathy, with long-term current use of insulin [facility]    History of lacunar cerebrovascular accident (CVA)     Past Medical History  Past Medical History:   Diagnosis Date    Cerebellar stroke side undetermined 2015 2013    Diabetes type 1, controlled ([facility] )     Hypertension      Past Surgical History  History reviewed  No pertinent surgical history  01/24/18 0845   Note Type   Note type Eval/Treat   Restrictions/Precautions   Weight Bearing Precautions Per Order No   Other Precautions Multiple lines;Telemetry; Fall Risk;Visual impairment   Pain Assessment   Pain Assessment No/denies pain   Pain Score No Pain   Home Living   Type of Home House   Home Layout Bed/bath upstairs; Two level;Stairs to enter with rails   Bathroom Shower/Tub Tub/shower unit   Bathroom Toilet Standard   Bathroom Equipment Other (Comment)  (no DME or grab bars)   Bathroom Accessibility Other (Comment)  (up full flight of stairs)   Home Equipment Other (Comment)  (no AD PTA)   Additional Comments Pt reports living w/ his parents in 2     Prior Function   Level of 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 [employer] Pt reports enjoying woodworking   ADL   Eating Assistance 5  Supervision/Setup   Eating Deficit Setup; Other (Comment)  (pt reports difficulty managing straw due to weak L)   Grooming Assistance 6  Modified Independent   Grooming Deficit Setup;Supervision/safety   UB Bathing Assistance Unable to assess   LB Bathing Assistance Unable to assess   UB Dressing Assistance 5  Supervision/Setup   UB Dressing Deficit Setup; Increased time to complete   LB Dressing Assistance 5  Supervision/Setup   LB Dressing Deficit Setup; Increased time to complete   Bed Mobility   Supine to Sit 5  Supervision   Additional items Assist x 1; Increased time required   Sit to Supine Unable to assess   Additional Comments Pt seated OOB in chair post eval w/ call bell in reach   Transfers   Sit to Stand 5  Supervision   Additional items Assist x 1; Increased time required   Stand to Sit 5  Supervision   Additional items Assist x 1; Armrests; Verbal cues   Functional Mobility   Functional Mobility 4  Minimal assistance  (CG/ min A using IV pole)   Additional Comments pt engaged in functional mobility w/ CS using IV pole and benefits from VC's for obstacle negotiation and to educated pt to increase attention to left   Additional items (IV pole)   Balance   Static Sitting Good   Static Standing Fair   Activity Tolerance   Activity Tolerance Patient limited by fatigue;Treatment limited secondary to medical complications (Comment)  (mulitple lines)   Medical Staff Made Aware spoke to PT, Sandra Garza and HIEU   Nurse Made Aware spoke to RN, Anjel Sawyer   RUE Assessment   RUE Assessment [facility]   RU Strength   RUE Overall Strength Within Functional Limits - able to perform ADL tasks with strength   LUE Assessment   LUE Assessment WFL   LUE Strength   LUE Overall Strength Within Functional Limits - able to perform ADL tasks with strength   Hand Function   Fine Motor Coordination Impaired  (right hand dominant; decreased finger to nose w/ L)   Sensation   Light Touch (decreaed to light touch L side of face)   Sharp/Dull Not tested   Vision-Basic Assessment   Current Vision Wears glasses all the time   Vision - Complex Assessment   Ocular Range of Motion Restricted on the left   Head Position WDL   Tracking L eye does not track laterally;R eye does not track medially;Decreased smoothness of horizontal tracking;Decreased smoothness of vertical tracking   Additional Comments Pt benefits from cues for obstacle negotiation on left  Pt demonstrated difficulty detecting stimuli on left   Perception   Inattention/Neglect (pt aware of L visual field deficits)   Motor Planning Appears intact   Perseveration Not present   Cognition   Overall Cognitive Status WFL   Arousal/Participation Arousable; Cooperative  (pt sleeping upon therapist arrival)   Attention Within functional limits   Orientation Level Oriented to person;Oriented to place;Oriented to situation   Memory Within functional limits   Following Commands Follows multistep commands with increased time or repetition   Comments Pt agreed to participate in OT eval  Pt identified by full name and   Pt able to provide social history and PLOF  Pt able to follow multi step directions w/ VC's and participate in conversation appropriately   Assessment   Limitation Decreased ADL status; Decreased endurance;Visual deficit; Decreased self-care trans;Decreased high-level ADLs   Assessment Pt is a 28 yo male [facility]  Pt presents w/ gaze palsy and signficant PMH impacting his occupational performance including Type 1 DM, HTN, hx CVA  Pt reports living w/ his parents in [address] [place] [address] PTA  Pt reports I w/ ADL/IADL including driving  Upon evaluation, pt demonstrated B UE AROM WFL to complete ADL, and UE strength WFL  Pt reports right hand dominance  Pt completed bed mobility w/ S supine to sit at EOB  Pt completed UBD/LBD w/ S after set- up and increased time   Pt engaged in functional mobiltiy w/ CG/ min A using IV pole  Pt reports wearing glassess at baseline  Pt demonstrated difficulty stimuli on L and decreased tracking w/ left eye laterally and right eye medially  Therapist observed that eye movements were not smooth  Pt able to read time accurately from clock on wall, but reports blurry  Pt presents w/ decreased standing balance, decreased activity tolerance / endurance, decreased dynamic sitting balalnce, decreased vision, decreased bilateral fine motor coordination impacting his I w/ dressing, bathing, feeding, oral hygiene, functional mobility, functional transfers  Pt would benefit from OT while in acute care to address deficits  From an OT perspective, pt would benefit from post acute rehab when medically stable for discharge from acute care  Will continue to follow pt while in acute care   Goals   Patient Goals none stated   Plan   Treatment Interventions ADL retraining;Visual perceptual retraining;UE strengthening/ROM; Equipment evaluation/education; Compensatory technique education;Continued evaluation; Activityengagement;Patient/family training   Goal Expiration Date 02/03/18   OT Frequency 5x/wk   Recommendation   OT Discharge Recommendation Other (Comment)  (post acute rehab)   OT - OK to Discharge (to post acute rehab at this time when medically stable)   Barthel Index   Feeding 5   Bathing 0   Grooming Score 5   Dressing Score 5   Bladder Score 10   Bowels Score 10   Toilet Use Score 5   Transfers (Bed/Chair) Score 10   Mobility (Level Surface) Score 0   Stairs Score 0   Barthel Index Score 50   Modified Radha Scale   Modified Radha Scale 4   Pt goals to be met in 10 days:  1  Pt will complete bed mobility w/ mod I supine <> sit  2  Pt will complete UBD w/ mod I  3  Pt will complete LBD w/ mod I  4  Pt will demonstrate good attention and participation in continued evaluation to assess visual deficits to assist in safe discharge planning  5   Pt will demonstrate increased activity tolerance and standing tolerance for at least 10 minutes to complete grooming standing at sink w/ mod I after set- up  6  Pt will complete functional transfers to bed, chair, and toilet w/ mod I  7  Pt will complete functional tub / shower transfer w/ mod I  8  Pt will demonstrate good attention and verbalized understanding of B UE HEP / therex to increase UE strength to maximize I w/ ADL/IADL performance  9  Pt will demonstrate understanding of B UE HEP / therex  10  Pt will demonstrate good attention and verbalized understanding of compensatory strategies to max I w/ ADL performance  11   Pt will consistently demonstrate understanding of compensatory strategies during ADL performance  Marcelina Cano, OT

## 2018-01-24 NOTE — PLAN OF CARE
Problem: PHYSICAL THERAPY ADULT  Goal: Performs mobility at highest level of function for planned discharge setting  See evaluation for individualized goals  Treatment/Interventions: Functional transfer training, LE strengthening/ROM, Elevations, Therapeutic exercise, Equipment eval/education, Bed mobility, Gait training, Patient/family training, Cognitive reorientation, Endurance training, Spoke to nursing  Equipment Recommended: Other (Comment) (cane TBD)       See flowsheet documentation for full assessment, interventions and recommendations  Prognosis: Good  Problem List: Decreased strength, Decreased endurance, Impaired balance, Decreased mobility, Decreased coordination, Impaired vision  Assessment:  Pt is a 27 y o  male seen for PT evaluation s/p admit to [address] [place] [address] on 1/22/2018 w/ CVA (cerebral vascular accident) ([facility] )  Pt in Critical care area  Order placed for PT  Prior to admission, pt was independent w/ all functional mobility w/ o device, lived in multi-level home and lived with parents  Upon evaluation: Pt requires supervision assistance for bed mobility and transfers and minimal assistance for transfers and ambulation with unilateral support of IV Pole  Pt's clinical presentation is currently unstable/unpredictable given the functional mobility deficits above, especially impaired balance, decreased endurance, gait deviations, decreased functional mobility tolerance, fall risk and facial weakness, limited vision, combined with medical complications of hypertension  and abnormal renal lab values  Pt to benefit from continued skilled PT tx while in hospital and upon DC to address deficits as defined above and maximize level of functional mobility  From PT/mobility standpoint, recommendation at time of d/c would be inpatient rehab pending progress in order to maximize pt's functional independence and consistency w/ mobility in order to facilitate return to OF    Recommend trial with cane next 1-2 sessions, ther ex next 1-2 session, OT consult and case management consult  Barriers to Discharge: Inaccessible home environment (NIRU And stairs inside of home)     Recommendation: Post acute IP rehab (unless pt progresses to more indep level of function)          See flowsheet documentation for full assessment

## 2018-01-24 NOTE — PROGRESS NOTES
NEPHROLOGY PROGRESS NOTE   Mag Mckeon 27 y o  male MRN: [id number]  Unit/Bed#:  Encounter: [id number]  Reason for Consult: LLUVIA/CKD    ASSESSMENT/PLAN:  1  Acute Kidney Injury vs progression of chronic kidney disease- Likely etiology secondary to hypertensive emergency complicated by IV contrast load (1/22) +/- contrast induced nephropathy in the setting of lisinopril +/- mild NSAID use  - avoid IV contrast and gadolinium until renal function stabilizes  - avoid nephrotoxics (NSAIDs & lisinopril)  - avoid hypotension  - renal ultrasound normal  - CPK level 161 (acceptable)  - PVR okay at 112ml  - monitor strict intake/output/weights  - no urgent need for dialysis but will evaluate daily for need, if UO drops or if electrolyte imbalance, will need dialysis  - continue IVF but change to bicarbonate drip  2  Chronic Kidney Disease stage III- Baseline creatinine 1 9 in early 2016 but was 2 3 in July 2017  Suspected etiology from diabetic nephropathy  I do not feel a renal biopsy is warranted at this point  Previously followed with Dr Caitlyn Whiting (last seen in 2/2016)  - UA: + glucose, trace blood (1-2 RBC), >300 protein  3  Hypertensive Urgency- BP improved to goal for this morning, avoid rapid changes in lowering BP  - holding lisinopril  - cardene drip managed by primary team  - currently on hydralazine 50mg TID (home dose)  - start carvedilol 25mg BID (home dose)  4  Proteinuria- likely secondary to diabetic nephropathy  - SPEP and UPEP pending  5  Diabetes mellitus, type 1- diagnosed around the age of 2  5  Bilateral Right Gaze Palsy / ? Demyelinating Disease / Cerebral Vascular Disease- neurology on board  - received gabutrol (MRI) on 1/22  - received IV contrast (CTA) on 1/22  7  Anemia- check iron studies  8  BMD- phosphorus elevated, start binder with meals  - vitamin D level pending    SUBJECTIVE:  Patient fatigued  Denies significant decrease in appetite  Denies metallic taste in mouth  Denies belching more than normal   Denies N/V/D  Dialysis discussed with patient and his mother  All questions answered      OBJECTIVE:  Current Weight: Weight - Scale: 91 3 kg (201 lb 4 5 oz)  Vitals:    01/24/18 0515 01/24/18 0700 01/24/18 0815 01/24/18 0915   BP: (!) 172/99 (!) 177/91 166/95 (!) 175/95   BP Location:  Right arm     Pulse: 89 88 80 82   Resp:  18     Temp:  98 6 °F (37 °C)     TempSrc:  Oral     SpO2: 97% 98% 97% 98%   Weight:       Height:           Intake/Output Summary (Last 24 hours) at 01/24/18 0935  Last data filed at 01/24/18 0330   Gross per 24 hour   Intake           2343 2 ml   Output              670 ml   Net           1673 2 ml     General: NAD  Skin: no rash  HEENT: normocephalic atraumatic  Neck: supple  Chest: decreased breath sounds  Heart: RRR  Abdomen: soft, nt, nd  Extremities: no edema  Neuro: alert awake  Psych: mood and affect appropriate    Medications:    Current Facility-Administered Medications:     acetaminophen (TYLENOL) tablet 650 mg, 650 mg, Oral, Q4H PRN, Justina L Jassi, PA-C    atorvastatin (LIPITOR) tablet 40 mg, 40 mg, Oral, QPM, Justina L Jassi, PA-C, 40 mg at 01/23/18 1800    carvedilol (COREG) tablet 25 mg, 25 mg, Oral, BID With Meals, [employer] PA-C, 25 mg at 01/24/18 0806    docusate sodium (COLACE) capsule 100 mg, 100 mg, Oral, BID, Justina L Jassi, PA-C, 100 mg at 01/24/18 0826    heparin (porcine) subcutaneous injection 5,000 Units, 5,000 Units, Subcutaneous, Q8H [facility] **AND** Platelet count, , , Once, Shama Tejada MD    hydrALAZINE (APRESOLINE) tablet 50 mg, 50 mg, Oral, TID, Justina L Jassi, PA-C, 50 mg at 01/24/18 0826    insulin glargine (LANTUS) subcutaneous injection 12 Units, 12 Units, Subcutaneous, Q12H [facility], Vinh Buckley PA-C, 12 Units at 01/24/18 0826    insulin lispro (HumaLOG) 100 units/mL subcutaneous injection 1-6 Units, 1-6 Units, Subcutaneous, TID VALERIE **AND** Fingerstick Glucose (POCT), , , TID AC, Shama Tejada MD    insulin lispro (HumaLOG) 100 units/mL subcutaneous injection 6 Units, 6 Units, Subcutaneous, TID With Meals, Sherren Pax, MD    niCARdipine (CARDENE) 25 mg (STANDARD CONCENTRATION) in sodium chloride 0 9% 250 mL, 1-15 mg/hr, Intravenous, Titrated, Justina Keene PA-C, Last Rate: 25 mL/hr at 01/24/18 0330, 2 5 mg/hr at 01/24/18 0330    ondansetron (ZOFRAN) injection 4 mg, 4 mg, Intravenous, Q6H PRN, Justina Keene PA-C, 4 mg at 01/22/18 1959    sodium chloride 0 9 % infusion, 100 mL/hr, Intravenous, Continuous, DEISI Lucio, Last Rate: 100 mL/hr at 01/24/18 0450, 100 mL/hr at 01/24/18 0450    Laboratory Results:    Results from last 7 days  Lab Units 01/24/18  0508 01/23/18  0450 01/22/18  1942 01/22/18  1015   WBC Thousand/uL 8 46 8 75  --  8 24   HEMOGLOBIN g/dL 8 9* 9 1*  --  10 5*   HEMATOCRIT % 26 0* 26 8*  --  30 8*   PLATELETS Thousands/uL 222 235  --  275   SODIUM mmol/L 140 139 136 136   POTASSIUM mmol/L 4 4 4 2 4 8 5 3   CHLORIDE mmol/L 108 108 105 105   CO2 mmol/L 20* 21 24 22   BUN mg/dL 47* 43* 43* 35*   CREATININE mg/dL 6 80* 4 57* 4 31* 3 74*   CALCIUM mg/dL 8 1* 8 3 8 7 8 9   MAGNESIUM mg/dL  --  2 0  --  2 0   PHOSPHORUS mg/dL 6 5*  --   --   --    TOTAL PROTEIN g/dL  --  5 4*  --  6 2*   GLUCOSE RANDOM mg/dL 72 86 392* 360*

## 2018-01-24 NOTE — PROGRESS NOTES
I received a call from the on-call neurologist Dr Jared Henry at approximately 7 pm that the patient did in fact had an MRI with gadolinium  First when I saw the patient earlier this afternoon all that was resulted was a CT angiogram of the head and neck  There was no result of any MRI at that time; I had spoken with neurology advanced practitioner about discontinuing an MRI with gadolinium that had been ordered given the LLUVIA until stabilization of kidney function  I was not aware until called by Dr Jared Henry that the patient had already received a MRI with gadolinium  The patient received gabutrol, which is a Group II agent as noted in the "[employer] Manual on Contrast Media" from May 2017  Group II agents: "the risk of NSF among patients exposed to standard or lower than standard doses of group II GBCAs is sufficiently low or possibly nonexistent such that assessment of renal function with a questionnaire or laboratory testing is optional prior to intravenous administration "     Here is more information from the ACR Manual on Contrast Media concerning Group II agents: "Group II agents are strongly preferred in patients at risk for NSF  Given the very low, if any, risk of NSF development with group II agents, regardless of renal function or dialysis status, informed consent is not recommended prior to GBCA group II injection "    Regarding the need of dialysis is a group II agent is used:  "When using a group II agent, the risk of NSF is so low that the ACR Committee on Drugs and Contrast Media believes that the risk-benefit equation does not favor repeated dialysis sessions "    I spoke with Dr Jared Henry, neurology, and  Vilma Griffith PA-C in ICU this evening  Based on the above, and the fact that the patient's exposure to gadolinium was Jan 22 at 8pm, this patient's exposure to gadolinium is more than 20 hours   I believe that given the above recommendations from the [employer], especially as Gabutrol,  A Class II agent was used, the risks of dialysis right now outweigh the benefits  Regarding the patient's LLUVIA if there is no improvement in the next 24-48 hours dialysis may be required, but given the above information would not do a dialysis right now  Continue with supportive care with IV fluids and blood pressure control with IV nicardipine  In my initial consult note I had mentioned the possibility  Of dialysis being needed should gadolinium be administered, however, the patient was administrated a Class II agent, gabutrol, and are following the recommendations as mentioned above

## 2018-01-25 ENCOUNTER — APPOINTMENT (INPATIENT)
Dept: CT IMAGING | Facility: HOSPITAL | Age: 33
DRG: 045 | End: 2018-01-25
Payer: COMMERCIAL

## 2018-01-25 LAB
ANION GAP SERPL CALCULATED.3IONS-SCNC: 10 MMOL/L (ref 4–13)
APTT PPP: 35 SECONDS (ref 23–35)
BUN SERPL-MCNC: 52 MG/DL (ref 5–25)
CALCIUM SERPL-MCNC: 7.8 MG/DL (ref 8.3–10.1)
CARDIOLIPIN IGA SER IA-ACNC: <9 APL U/ML (ref 0–11)
CARDIOLIPIN IGG SER IA-ACNC: <9 GPL U/ML (ref 0–14)
CARDIOLIPIN IGM SER IA-ACNC: <9 MPL U/ML (ref 0–12)
CHLORIDE SERPL-SCNC: 105 MMOL/L (ref 100–108)
CO2 SERPL-SCNC: 20 MMOL/L (ref 21–32)
CREAT SERPL-MCNC: 7.87 MG/DL (ref 0.6–1.3)
GFR SERPL CREATININE-BSD FRML MDRD: 8 ML/MIN/1.73SQ M
GLUCOSE SERPL-MCNC: 163 MG/DL (ref 65–140)
GLUCOSE SERPL-MCNC: 179 MG/DL (ref 65–140)
GLUCOSE SERPL-MCNC: 188 MG/DL (ref 65–140)
GLUCOSE SERPL-MCNC: 236 MG/DL (ref 65–140)
GLUCOSE SERPL-MCNC: 249 MG/DL (ref 65–140)
GLUCOSE SERPL-MCNC: 276 MG/DL (ref 65–140)
INR PPP: 0.95 (ref 0.86–1.16)
MAGNESIUM SERPL-MCNC: 2.1 MG/DL (ref 1.6–2.6)
PHOSPHATE SERPL-MCNC: 6.9 MG/DL (ref 2.7–4.5)
POTASSIUM SERPL-SCNC: 4.8 MMOL/L (ref 3.5–5.3)
PROT C AG ACT/NOR PPP IA: 122 % OF NORMAL (ref 60–150)
PROT S ACT/NOR PPP: 115 % (ref 57–157)
PROT S PPP-ACNC: 88 % (ref 60–150)
PROTHROMBIN TIME: 13 SECONDS (ref 12.1–14.4)
SODIUM SERPL-SCNC: 135 MMOL/L (ref 136–145)

## 2018-01-25 PROCEDURE — 83735 ASSAY OF MAGNESIUM: CPT | Performed by: INTERNAL MEDICINE

## 2018-01-25 PROCEDURE — 84100 ASSAY OF PHOSPHORUS: CPT | Performed by: INTERNAL MEDICINE

## 2018-01-25 PROCEDURE — 70450 CT HEAD/BRAIN W/O DYE: CPT

## 2018-01-25 PROCEDURE — 82948 REAGENT STRIP/BLOOD GLUCOSE: CPT

## 2018-01-25 PROCEDURE — 85730 THROMBOPLASTIN TIME PARTIAL: CPT | Performed by: PHYSICIAN ASSISTANT

## 2018-01-25 PROCEDURE — 99255 IP/OBS CONSLTJ NEW/EST HI 80: CPT | Performed by: INTERNAL MEDICINE

## 2018-01-25 PROCEDURE — 99233 SBSQ HOSP IP/OBS HIGH 50: CPT | Performed by: PHYSICIAN ASSISTANT

## 2018-01-25 PROCEDURE — 99232 SBSQ HOSP IP/OBS MODERATE 35: CPT | Performed by: INTERNAL MEDICINE

## 2018-01-25 PROCEDURE — 85610 PROTHROMBIN TIME: CPT | Performed by: PHYSICIAN ASSISTANT

## 2018-01-25 PROCEDURE — 80048 BASIC METABOLIC PNL TOTAL CA: CPT | Performed by: INTERNAL MEDICINE

## 2018-01-25 RX ORDER — HEPARIN SODIUM 5000 [USP'U]/ML
5000 INJECTION, SOLUTION INTRAVENOUS; SUBCUTANEOUS EVERY 8 HOURS SCHEDULED
Status: DISCONTINUED | OUTPATIENT
Start: 2018-01-25 | End: 2018-01-25

## 2018-01-25 RX ORDER — ERGOCALCIFEROL 1.25 MG/1
50000 CAPSULE ORAL WEEKLY
Status: DISCONTINUED | OUTPATIENT
Start: 2018-01-26 | End: 2018-02-05 | Stop reason: HOSPADM

## 2018-01-25 RX ORDER — LABETALOL HYDROCHLORIDE 5 MG/ML
10 INJECTION, SOLUTION INTRAVENOUS EVERY 4 HOURS PRN
Status: DISCONTINUED | OUTPATIENT
Start: 2018-01-25 | End: 2018-02-05 | Stop reason: HOSPADM

## 2018-01-25 RX ORDER — AMLODIPINE BESYLATE 5 MG/1
5 TABLET ORAL DAILY
Status: DISCONTINUED | OUTPATIENT
Start: 2018-01-25 | End: 2018-01-26

## 2018-01-25 RX ORDER — HYDRALAZINE HYDROCHLORIDE 20 MG/ML
10 INJECTION INTRAMUSCULAR; INTRAVENOUS ONCE
Status: DISCONTINUED | OUTPATIENT
Start: 2018-01-25 | End: 2018-01-31

## 2018-01-25 RX ORDER — HYDRALAZINE HYDROCHLORIDE 20 MG/ML
10 INJECTION INTRAMUSCULAR; INTRAVENOUS ONCE
Status: COMPLETED | OUTPATIENT
Start: 2018-01-25 | End: 2018-01-25

## 2018-01-25 RX ORDER — INSULIN GLARGINE 100 [IU]/ML
15 INJECTION, SOLUTION SUBCUTANEOUS
Status: DISCONTINUED | OUTPATIENT
Start: 2018-01-25 | End: 2018-01-27

## 2018-01-25 RX ORDER — INSULIN GLARGINE 100 [IU]/ML
15 INJECTION, SOLUTION SUBCUTANEOUS
Status: DISCONTINUED | OUTPATIENT
Start: 2018-01-26 | End: 2018-01-27

## 2018-01-25 RX ORDER — HYDRALAZINE HYDROCHLORIDE 25 MG/1
75 TABLET, FILM COATED ORAL 3 TIMES DAILY
Status: DISCONTINUED | OUTPATIENT
Start: 2018-01-25 | End: 2018-01-26

## 2018-01-25 RX ADMIN — DOCUSATE SODIUM 100 MG: 100 CAPSULE, LIQUID FILLED ORAL at 09:04

## 2018-01-25 RX ADMIN — SODIUM CHLORIDE 5 MG/HR: 0.9 INJECTION, SOLUTION INTRAVENOUS at 09:40

## 2018-01-25 RX ADMIN — HYDRALAZINE HYDROCHLORIDE 75 MG: 25 TABLET, FILM COATED ORAL at 20:59

## 2018-01-25 RX ADMIN — HYDRALAZINE HYDROCHLORIDE 10 MG: 20 INJECTION INTRAMUSCULAR; INTRAVENOUS at 02:18

## 2018-01-25 RX ADMIN — CALCIUM ACETATE 667 MG: 667 CAPSULE ORAL at 09:03

## 2018-01-25 RX ADMIN — CARVEDILOL 25 MG: 12.5 TABLET, FILM COATED ORAL at 09:03

## 2018-01-25 RX ADMIN — INSULIN LISPRO 3 UNITS: 100 INJECTION, SOLUTION INTRAVENOUS; SUBCUTANEOUS at 11:52

## 2018-01-25 RX ADMIN — CALCIUM ACETATE 667 MG: 667 CAPSULE ORAL at 15:52

## 2018-01-25 RX ADMIN — INSULIN GLARGINE 15 UNITS: 100 INJECTION, SOLUTION SUBCUTANEOUS at 22:58

## 2018-01-25 RX ADMIN — ONDANSETRON 4 MG: 2 INJECTION INTRAMUSCULAR; INTRAVENOUS at 02:18

## 2018-01-25 RX ADMIN — INSULIN LISPRO 1 UNITS: 100 INJECTION, SOLUTION INTRAVENOUS; SUBCUTANEOUS at 22:59

## 2018-01-25 RX ADMIN — SODIUM BICARBONATE 100 ML/HR: 84 INJECTION, SOLUTION INTRAVENOUS at 02:05

## 2018-01-25 RX ADMIN — LABETALOL HYDROCHLORIDE 10 MG: 5 INJECTION, SOLUTION INTRAVENOUS at 18:37

## 2018-01-25 RX ADMIN — INSULIN LISPRO 6 UNITS: 100 INJECTION, SOLUTION INTRAVENOUS; SUBCUTANEOUS at 13:15

## 2018-01-25 RX ADMIN — SODIUM CHLORIDE 5 MG/HR: 0.9 INJECTION, SOLUTION INTRAVENOUS at 04:14

## 2018-01-25 RX ADMIN — CALCIUM ACETATE 667 MG: 667 CAPSULE ORAL at 13:15

## 2018-01-25 RX ADMIN — INSULIN LISPRO 4 UNITS: 100 INJECTION, SOLUTION INTRAVENOUS; SUBCUTANEOUS at 17:52

## 2018-01-25 RX ADMIN — HYDRALAZINE HYDROCHLORIDE 75 MG: 25 TABLET, FILM COATED ORAL at 15:52

## 2018-01-25 RX ADMIN — HEPARIN SODIUM 5000 UNITS: 5000 INJECTION, SOLUTION INTRAVENOUS; SUBCUTANEOUS at 06:39

## 2018-01-25 RX ADMIN — CARVEDILOL 25 MG: 12.5 TABLET, FILM COATED ORAL at 15:52

## 2018-01-25 RX ADMIN — HYDRALAZINE HYDROCHLORIDE 50 MG: 25 TABLET, FILM COATED ORAL at 09:03

## 2018-01-25 RX ADMIN — AMLODIPINE BESYLATE 5 MG: 5 TABLET ORAL at 13:23

## 2018-01-25 RX ADMIN — INSULIN LISPRO 3 UNITS: 100 INJECTION, SOLUTION INTRAVENOUS; SUBCUTANEOUS at 09:04

## 2018-01-25 RX ADMIN — ATORVASTATIN CALCIUM 40 MG: 40 TABLET, FILM COATED ORAL at 17:51

## 2018-01-25 NOTE — ASSESSMENT & PLAN NOTE
· Present upon admission, baseline 1 9-2 2 likely secondary to uncontrolled type 1 diabetes mellitus  · Suspect ARF due to contrast induced nephropathy  · Bicarb infusion per Nephrology  · Significant proteinuria noted on Ua, SPEP UPEP pending  · Oliguric, monitor serial bladder scans  · Renal ultrasound is unremarkbale  · low phos, low K diet  · Nephrology following, possible dialysis on Friday if renal function does not improve

## 2018-01-25 NOTE — PROGRESS NOTES
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 risk factors include hypertensive urgency fairly uncontrolled type 1 diabetes mellitus and significant smoker half to 1/2 packs a day  · Punctate CVA in [place] noted on MRI (see addendum from yesterday)  · Demyelination noted on MRI  · Needs LP to eval for MS  · Outpatient follow up with opthalmology for retinopathy  · Titrate nicardipine drip to ensure gradual reduction in BP  · Patching eyes q2h  · Continue statin  · Antiplatelets held for LP on the 26th  · PT/OT following        Acute renal failure superimposed on stage 2 chronic kidney disease ([address] )   Assessment & Plan    · Present upon admission, baseline 1 9-2 2 likely secondary to uncontrolled type 1 diabetes mellitus  · Suspect ARF due to contrast induced nephropathy  · Bicarb infusion per Nephrology  · Significant proteinuria noted on Ua, SPEP UPEP pending  · Oliguric, monitor serial bladder scans  · Renal ultrasound is unremarkbale  · low phos, low K diet  · Nephrology following, possible dialysis on Friday if renal function does not improve        Hypertensive urgency   Assessment & Plan    · Improving   · Likely due to CVA  · Continue coreg, hydarlazine, nicardipine drip, BP titrating down as recommended  · Add amlodipine and titrate down the cardiac injury        Type 1 diabetes mellitus with diabetic nephropathy, with long-term current use of insulin ([address] ) Assessment & Plan    · Glucose stabilized  · A1c is 6 4  · Continue basal bolus insulin  · Titrate up Lantus today            VTE Pharmacologic Prophylaxis:   Pharmacologic: Heparin  Mechanical VTE Prophylaxis in Place: Yes    Patient Centered Rounds: I have performed bedside rounds with nursing staff today  Education and Discussions with Family / Patient: discussed findings of studies and plan of care with the patient and family at bedside    Time Spent for Care: 30 minutes  More than 50% of total time spent on counseling and coordination of care as described above  Current Length of Stay: 3 day(s)    Current Patient Status: Inpatient   Certification Statement: The patient will continue to require additional inpatient hospital stay due to pending LP    Discharge Plan: TBD    Code Status: Level 1 - Full Code      Subjective:   No change in vision or facial droop    Objective:     Vitals:   Temp (24hrs), Av 4 °F (36 9 °C), Min:98 °F (36 7 °C), Max:98 6 °F (37 °C)    HR:  [73-91] 80  Resp:  [16-19] 19  BP: (151-202)/() 151/98  SpO2:  [97 %-100 %] 97 %  Body mass index is 28 88 kg/m²  Input and Output Summary (last 24 hours): Intake/Output Summary (Last 24 hours) at 18 1310  Last data filed at 18 1151   Gross per 24 hour   Intake          3800 42 ml   Output              625 ml   Net          3175 42 ml       Physical Exam:     Physical Exam   Constitutional: He is oriented to person, place, and time  Obese   HENT:   Head: Normocephalic and atraumatic  Eyes: Pupils are equal, round, and reactive to light  Left gaze palsy   Neck: Neck supple  No JVD present  Cardiovascular: Normal rate and regular rhythm  Pulmonary/Chest: Effort normal and breath sounds normal  He has no wheezes  He has no rales  Abdominal: Soft  There is no tenderness  Musculoskeletal: He exhibits edema  Neurological: He is alert and oriented to person, place, and time   A cranial nerve deficit is present  Left facial droop   Skin: Skin is warm and dry  Additional Data:     Labs:      Results from last 7 days  Lab Units 01/24/18  0508   WBC Thousand/uL 8 46   HEMOGLOBIN g/dL 8 9*   HEMATOCRIT % 26 0*   PLATELETS Thousands/uL 222   NEUTROS PCT % 68   LYMPHS PCT % 19   MONOS PCT % 7   EOS PCT % 5       Results from last 7 days  Lab Units 01/25/18  0445 01/24/18  1534   SODIUM mmol/L 135* 138   POTASSIUM mmol/L 4 8 5 0   CHLORIDE mmol/L 105 106   CO2 mmol/L 20* 21   BUN mg/dL 52* 47*   CREATININE mg/dL 7 87* 7 36*   CALCIUM mg/dL 7 8* 7 7*   TOTAL PROTEIN g/dL  --  5 1*   BILIRUBIN TOTAL mg/dL  --  0 20   ALK PHOS U/L  --  85   ALT U/L  --  15   AST U/L  --  16   GLUCOSE RANDOM mg/dL 179* 109       Results from last 7 days  Lab Units 01/25/18  1202   INR  0 95       * I Have Reviewed All Lab Data Listed Above  * Additional Pertinent Lab Tests Reviewed: All Labs Within Last 24 Hours Reviewed    Imaging:    Imaging Reports Reviewed Today Include: Ct, MRI    Recent Cultures (last 7 days):           Last 24 Hours Medication List:     amLODIPine 5 mg Oral Daily   atorvastatin 40 mg Oral QPM   calcium acetate 667 mg Oral TID With Meals   carvedilol 25 mg Oral BID With Meals   docusate sodium 100 mg Oral BID   [START ON 1/26/2018] ergocalciferol 50,000 Units Oral Weekly   heparin (porcine) 5,000 Units Subcutaneous Q8H [facility]   hydrALAZINE 10 mg Intravenous Once   hydrALAZINE 75 mg Oral TID   insulin lispro 1-6 Units Subcutaneous TID AC   insulin lispro 6 Units Subcutaneous TID With Meals        Today, Patient Was Seen By: Jorge Alberto Iniguez MD    ** Please Note: Dictation voice to text software may have been used in the creation of this document   **

## 2018-01-25 NOTE — ASSESSMENT & PLAN NOTE
· Improving   · Likely due to CVA  · Continue coreg, hydarlazine, nicardipine drip, BP titrating down as recommended  · Add amlodipine and titrate down the cardiac injury

## 2018-01-25 NOTE — PROGRESS NOTES
Progress Note - Neurology   [facility] 27 [facility]  [facility] MRN: [id number]  Unit/Bed#:  Encounter: [id number]    Assessment:  23-year-old male presenting with bilateral right gaze palsy  Neuroimaging obtained as patient presented as stroke alert, CTA of head and neck negative for acute large vessel disease  MRI of the brain (with and without contrast) was obtained evening of January 22nd, revealing diffusion abnormality in the posterior velia  Evaluating for suspected demyelinating disease of unclear etiology (MS, autoimmune)  Plan:  1  Repeat MRI brain, MRI of the cervical and thoracic spine, as well as repeat CT head reviewed with attending  2   Plan for LP tomorrow via IR  CSF orders placed, as well as recommend obtaining opening pressure  Administration of DDAVP for bleed risk prior to LP was discussed with IR nurse, pharmacy, and Nephrology attending  3   Will discuss course of treatment following review of CSF results (steroid trial, etc )  4  Nephrology following closely with acute kidney injury on CKD, monitoring renal function and need for hemodialysis  5   Continued hemodynamic monitoring, as well as correction of metabolic and infectious derangements per primary team   6   Supportive care  7   Therapies following  8   Discussed plan of care with attending neurologist, who is in agreement with plan  Continue to monitor neurologic exam closely  Subjective:   Patient resting in bed, notes continued right gaze preference with inability to cross midline to gaze to the left  Notes continued vision changes, unchanged compared to yesterday's evaluation  Otherwise no acute neurologic symptoms  Vitals: Blood pressure 151/98, pulse 80, temperature 98 °F (36 7 °C), temperature source Oral, resp  rate 19, height 5' 10" (1 778 m), weight 91 3 kg (201 lb 4 5 oz), SpO2 97 %  ,Body mass index is 28 88 kg/m²      Physical Exam:  Physical Exam   Constitutional: He is oriented to person, place, and time  He appears well-developed and well-nourished  HENT:   Head: Normocephalic and atraumatic  Eyes: Conjunctivae are normal  Pupils are equal, round, and reactive to light  Neck: Normal range of motion  Neck supple  Cardiovascular: Normal rate and regular rhythm  Pulmonary/Chest: Effort normal and breath sounds normal    Abdominal: Soft  Bowel sounds are normal    Musculoskeletal: Normal range of motion  Neurological: He is alert and oriented to person, place, and time  He has a normal Finger-Nose-Finger Test and a normal Heel to [employer]    Skin: Skin is warm and dry  Psychiatric: His speech is normal       Neurologic Exam     Mental Status   Oriented to person, place, and time  Follows 2 step commands  Attention: normal  Concentration: normal    Speech: speech is normal   Knowledge: good  Able to read  Able to repeat  Normal comprehension  Cranial Nerves     CN II   Visual fields full to confrontation  CN III, IV, VI   Pupils are equal, round, and reactive to light  CN V   Left facial sensation deficit: Slightly diminished V3 on the left compared to right  CN VIII   CN VIII normal      CN IX, X   CN IX normal    CN X normal      CN XII   CN XII normal      Remains with right gaze preference and bilateral gaze palsy  Remains with left facial droop, nasolabial fold asymmetry and decreased eyelid closure strength, slightly lasts eyebrow raise on the left  Motor Exam   Muscle bulk: normal  Overall muscle tone: normal  Right arm pronator drift: absent  Left arm pronator drift: absent  Intact upper and lower extremity strength throughout bilaterally  Sensory Exam   Light touch normal      Diminished vibratory sensation with the right toe compared to left, otherwise intact vibratory sensation throughout        Gait, Coordination, and Reflexes     Coordination   Finger to nose coordination: normal  Heel to shin coordination: normal    Tremor   Resting tremor: absent  Intention tremor: absent    Reflexes   Right plantar: upgoing  Left plantar: normal  Right ankle clonus: present (Few beats nonsustained)  Left ankle clonus present: Questionable 1-2 beats nonsustained  Lab, Imaging and other studies:    CT head 01/25/2018: No acute intracranial abnormality  Periventricular white matter foci of hypoattenuation as on similar studies and consistent with chronic ischemic disease versus possible demyelinating disease  MRI brain 01/24/2018: 1  Similar-appearing small focus of subacute infarct along the posterior aspect of the velia near the facial colliculus (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)  2  Unchanged white matter regions of signal abnormality superiorly related to demyelinating disease or other white matter etiologies  3  Increased signal abnormality in the right anterior genu corpus callosum and right thalamus which could be from retained contrast from recent MRI  4  Diffusely increased subarachnoid FLAIR signal likely due to any recent lumbar puncture, oxygenation or metabolic etiology  Please follow-up with unenhanced CT brain  MRI cervical spine 01/24/2018: No clear evidence for demyelinating disease in the cervical cord  MRI thoracic spine 01/24/2018: Given the degree of motion, difficult to identify clear cord lesions to confirm demyelinating disease           CBC:   Results from last 7 days  Lab Units 01/24/18  0508 01/23/18  0450 01/22/18  1015   WBC Thousand/uL 8 46 8 75 8 24   RBC Million/uL 2 88* 2 96* 3 42*   HEMOGLOBIN g/dL 8 9* 9 1* 10 5*   HEMATOCRIT % 26 0* 26 8* 30 8*   MCV fL 90 91 90   PLATELETS Thousands/uL 222 235 275   , BMP/CMP:   Results from last 7 days  Lab Units 01/25/18  0445 01/24/18  1534 01/24/18  0508 01/23/18  0450  01/22/18  1015   SODIUM mmol/L 135* 138 140 139  < > 136   POTASSIUM mmol/L 4 8 5 0 4 4 4 2  < > 5 3   CHLORIDE mmol/L 105 106 108 108  < > 105   CO2 mmol/L 20* 21 20* 21  < > 22   ANION GAP mmol/L 10 11 12 10  < > 9   BUN mg/dL 52* 47* 47* 43*  < > 35*   CREATININE mg/dL 7 87* 7 36* 6 80* 4 57*  < > 3 74*   GLUCOSE RANDOM mg/dL 179* 109 72 86  < > 360*   CALCIUM mg/dL 7 8* 7 7* 8 1* 8 3  < > 8 9   AST U/L  --  16  --  15  --  16   ALT U/L  --  15  --  16  --  19   ALK PHOS U/L  --  85  --  75  --  89   TOTAL PROTEIN g/dL  --  5 1*  --  5 4*  --  6 2*   BILIRUBIN TOTAL mg/dL  --  0 20  --  0 30  --  0 40   EGFR ml/min/1 73sq m 8 9 10 16  < > 20   < > = values in this interval not displayed  , Vitamin B12:   , HgBA1C:   Results from last 7 days  Lab Units 01/23/18  0450   HEMOGLOBIN A1C % 6 4*   , TSH:   Results from last 7 days  Lab Units 01/23/18  0450   TSH 3RD GENERATON uIU/mL 2 208   , Lipid Profile:   Results from last 7 days  Lab Units 01/23/18  0450   HDL mg/dL 38*   CHOLESTEROL mg/dL 215*   LDL CALC mg/dL 144*   TRIGLYCERIDES mg/dL 165*   , Urinalysis:   Results from last 7 days  Lab Units 01/23/18  0218   COLOR UA  Yellow   CLARITY UA  Clear   SPEC GRAV UA  1 020   PH UA  6 0   LEUKOCYTES UA  Elevated glucose may cause decreased leukocyte values  See urine microscopic for [facility] result/*   NITRITE UA  Negative   PROTEIN UA mg/dl >=300*   GLUCOSE UA mg/dl >=1000 (1%)*   KETONES UA mg/dl Negative   BILIRUBIN UA  Negative   BLOOD UA  Trace-Intact*     VTE Prophylaxis: Sequential compression device (Venodyne)  and Heparin    Counseling / Coordination of Care  Total time spent today 25 minutes  Greater than 50% of total time was spent with the patient and / or family counseling and / or coordination of care  A description of the counseling / coordination of care: Discussed plan of care with patient/family and primary team   Reviewed MRI brain as well as spinal imaging, and CT head today  For LP tomorrow morning

## 2018-01-25 NOTE — CONSULTS
Consultation - Loreto Medeiros Linda 27 y o  male MRN: [id number]    Unit/Bed#:  Encounter: [id number]      Assessment/Plan     Assessment: This is a 27y o -year-old male with type 1 diabetes, with CKD, worsening creatinine, with hyperglycemia  Goal for blood sugars 100-180 mg per dL, without hypoglycemia  CKD- managed by Nephrology  Bilateral right gaze palsy- suspected demyelinating disease of unclear etiology, managed by neurology  Hypertensive urgency- managed by primary team    Plan discussed with ICU team     Plan:  - increase Lantus to 15 units twice a day  -increase Humalog to 10 units with meals  -continue sliding scale with meals and at bedtime  -hypoglycemia protocol in place  -continue fingerstick monitoring the q a c , q h s  and at 2:00 a m   -discussed importance of compliance to insulin regimen, and complications of uncontrolled diabetes   -discussed to make follow-up appointment in 2 weeks after discharge with Endocrinology  -discussed with patient about consequences of hyperglycemia causing life-threatening complications such as DKA, syncopal episodes, and death    Thank you for consulting Endocrinology, please do not hesitate to call if you have any questions  CC: Diabetes Consult    History of Present Illness     HPI: Sujata Slade is a 27y o  year old male with type 1 diabetes, with CKD, stage Iv, worsened to stage 5, uncontrolled A1c, was admitted to [facility], because of vision disturbance  His currently admitted in ICU, to rule out stroke  CT scan showed periventricular white matter foci of hypoattenuation consistent with chronic ischemic disease versus possible demyelinating disease  He is followed by Neurology  We were called for diabetes management    He was started on insulin drip, for elevated blood sugars, it was stopped yesterday, the patient was given Lantus 12 units at bedtime, and received 12 units in the morning  He is also getting Humalog 6 units with meals  Blood Glucose trending down to in 200 range  Denies low blood sugar less than 70, denies hypoglycemia episode causing unresponsiveness or ER admission  He has type 1 diabetes since age 1, with unstable course  He admits noncompliance to his insulin regimen, he takes Lantus 24 units twice a day, and Humalog 10 units with meals plus scale  He misses doses frequently  Stated blood sugars are in 100-400 range  He has not followed up with Ophthalmology for last 2 years  He does not follow up with Endocrinology for diabetes  Currently diabetes is managed by his primary care physician    He has history of hypertension, was managed by Nephrology, patient was lost to follow-up  Admitted with hypertensive in urgency   BP Readings from Last 3 Encounters:   01/25/18 (!) 187/97   07/17/17 170/88   06/19/17 130/81       He denies any thyroid problems  Last A1c is  Lab Results   Component Value Date    HGBA1C 6 4 (H) 01/23/2018       Inpatient consult to Endocrinology  Consult performed by: [facility], [facility] [address]  ordered by: Chapito Coy          Review of Systems   Constitutional: Positive for activity change and fatigue  Negative for appetite change, diaphoresis, fever and unexpected weight change  HENT: Negative  Eyes: Positive for visual disturbance  Respiratory: Negative for cough, chest tightness and shortness of breath  Cardiovascular: Positive for leg swelling  Negative for chest pain and palpitations  Gastrointestinal: Positive for nausea  Negative for abdominal pain, blood in stool, constipation, diarrhea and vomiting  Endocrine: Negative for cold intolerance, heat intolerance, polydipsia, polyphagia and polyuria  Genitourinary: Positive for decreased urine volume  Negative for dysuria, enuresis, frequency and urgency  Musculoskeletal: Negative  Skin: Negative for pallor, rash and wound  Allergic/Immunologic: Negative      Neurological: Negative for dizziness, tremors, weakness and numbness  Hematological: Negative  Psychiatric/Behavioral: Negative  Historical Information   Past Medical History:   Diagnosis Date    Cerebellar stroke side undetermined 2015 2013    Diabetes type 1, controlled ([facility] [address] )     Hypertension      History reviewed  No pertinent surgical history  Social History   History   Alcohol Use    Yes     Comment: socially     History   Drug Use    Frequency: 3 0 times per week    Types: Marijuana     Comment: weekly     History   Smoking Status    Current Every Day Smoker    Packs/day: 0 50    Types: Cigarettes   Smokeless Tobacco    Never Used     Family History: History reviewed  No pertinent family history      Meds/Allergies   Current Facility-Administered Medications   Medication Dose Route Frequency Provider Last Rate Last Dose    acetaminophen (TYLENOL) tablet 650 mg  650 mg Oral Q4H PRN Justina Keene PA-C        amLODIPine (NORVASC) tablet 5 mg  5 mg Oral Daily Taylor Strauss MD   5 mg at 01/25/18 1323    atorvastatin (LIPITOR) tablet 40 mg  40 mg Oral QPM Justina Keene PA-C   40 mg at 01/24/18 1720    calcium acetate (PHOSLO) capsule 667 mg  667 mg Oral TID With Meals Erika Green PA-C   667 mg at 01/25/18 1315    carvedilol (COREG) tablet 25 mg  25 mg Oral BID With Meals [facility], PA-C   25 mg at 01/25/18 0893    docusate sodium (COLACE) capsule 100 mg  100 mg Oral BID Karon Keene PA-C   100 mg at 01/25/18 0904    [START ON 1/26/2018] ergocalciferol (VITAMIN D2) capsule 50,000 Units  50,000 Units Oral Weekly Erika Green PA-C        hydrALAZINE (APRESOLINE) injection 10 mg  10 mg Intravenous Once Taylor Strauss MD        hydrALAZINE (APRESOLINE) tablet 75 mg  75 mg Oral TID Erika Green PA-C        insulin lispro (HumaLOG) 100 units/mL subcutaneous injection 1-6 Units  1-6 Units Subcutaneous TID [facility] Taylor Strauss MD   3 Units at 01/25/18 1152    insulin lispro (HumaLOG) 100 units/mL subcutaneous injection 6 Units  6 Units Subcutaneous TID With Meals Yared Lara MD   6 Units at 01/25/18 1315    niCARdipine (CARDENE) 25 mg (STANDARD CONCENTRATION) in sodium chloride 0 9% 250 mL  1-15 mg/hr Intravenous Titrated Michelle Cantor PA-C   Stopped at 01/25/18 1323    ondansetron (ZOFRAN) injection 4 mg  4 mg Intravenous Q6H PRN Michelle Cantor PA-C   4 mg at 01/25/18 0218     Allergies   Allergen Reactions    Sulfa Antibiotics Rash       Objective   Vitals: Blood pressure (!) 187/97, pulse 84, temperature 98 1 °F (36 7 °C), temperature source Oral, resp  rate (!) 23, height 5' 10" (1 778 m), weight 91 3 kg (201 lb 4 5 oz), SpO2 98 %  Intake/Output Summary (Last 24 hours) at 01/25/18 1532  Last data filed at 01/25/18 1401   Gross per 24 hour   Intake          3882 08 ml   Output              625 ml   Net          3257 08 ml     Invasive Devices     Peripheral Intravenous Line            Peripheral IV 01/22/18 Right Hand 3 days    Peripheral IV 01/24/18 Right Antecubital less than 1 day                Physical Exam   Constitutional: He is oriented to person, place, and time  He appears well-developed and well-nourished  No distress  HENT:   Head: Normocephalic and atraumatic  Can not move right eye medially    Eyes: Conjunctivae and EOM are normal  Pupils are equal, round, and reactive to light  Right eye exhibits no discharge  Left eye exhibits no discharge  Neck: Normal range of motion  Neck supple  No tracheal deviation present  No thyromegaly present  Cardiovascular: Normal rate, regular rhythm, normal heart sounds and intact distal pulses  Exam reveals no friction rub  No murmur heard  Pulmonary/Chest: Effort normal and breath sounds normal  No respiratory distress  He has no wheezes  Abdominal: Soft  Bowel sounds are normal  He exhibits no distension  There is no tenderness  Musculoskeletal: Normal range of motion  He exhibits no edema, tenderness or deformity     Lymphadenopathy:     He has no cervical adenopathy  Neurological: He is alert and oriented to person, place, and time  He has normal reflexes  He exhibits normal muscle tone  Coordination normal    Skin: Skin is warm and dry  No rash noted  He is not diaphoretic  No erythema  No pallor  Psychiatric: He has a normal mood and affect  His behavior is normal    Vitals reviewed  The history was obtained from the review of the chart, patient  Lab Results:     Results from last 7 days  Lab Units 01/23/18  0450   HEMOGLOBIN A1C % 6 4*     Lab Results   Component Value Date    WBC 8 46 01/24/2018    HGB 8 9 (L) 01/24/2018    HCT 26 0 (L) 01/24/2018    MCV 90 01/24/2018     01/24/2018     Lab Results   Component Value Date/Time    BUN 52 (H) 01/25/2018 04:45 AM    BUN 34 (H) 11/03/2015 11:32 AM     (L) 01/25/2018 04:45 AM     10/28/2015 05:43 PM    K 4 8 01/25/2018 04:45 AM    K 3 6 10/28/2015 05:43 PM     01/25/2018 04:45 AM     10/28/2015 05:43 PM    CO2 20 (L) 01/25/2018 04:45 AM    CO2 27 10/28/2015 05:43 PM    CREATININE 7 87 (H) 01/25/2018 04:45 AM    CREATININE 1 90 (H) 11/03/2015 11:33 AM    AST 16 01/24/2018 03:34 PM    AST 10 10/28/2015 05:43 PM    ALT 15 01/24/2018 03:34 PM    ALT 25 10/28/2015 05:43 PM    ALB 2 2 (L) 01/24/2018 03:34 PM    ALB 4 0 10/28/2015 05:43 PM     Recent Labs      01/23/18   0450   CHOL  215*   HDL  38*   TRIG  165*     No results found for: MICROALGUSTAVO MENDIOLA  POC Glucose (mg/dl)   Date Value   01/25/2018 249 (H)   01/25/2018 236 (H)   01/25/2018 188 (H)   01/24/2018 157 (H)   01/24/2018 95   01/24/2018 116   01/24/2018 68   01/24/2018 183 (H)   01/24/2018 165 (H)   01/23/2018 188 (H)       Imaging Studies: I have personally reviewed pertinent reports  Portions of the record may have been created with voice recognition software

## 2018-01-25 NOTE — ASSESSMENT & PLAN NOTE
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

## 2018-01-25 NOTE — PROGRESS NOTES
NEPHROLOGY PROGRESS NOTE   Álvaro Mckeon 27 y o  male MRN: [id number]  Unit/Bed#:  Encounter: [id number]  Reason for Consult: LLUVIA/CKD    ASSESSMENT/PLAN:  1  Acute Kidney Injury vs progression of chronic kidney disease- Likely etiology secondary to hypertensive emergency complicated by IV contrast load (1/22) +/- contrast induced nephropathy in the setting of lisinopril +/- mild NSAID use  - avoid IV contrast and gadolinium until renal function stabilizes  - avoid nephrotoxics (NSAIDs & lisinopril)  - avoid hypotension  - renal ultrasound normal  - CPK level 161 (acceptable)  - PVR okay at 112ml  - monitor strict intake/output/weights  - no urgent need for dialysis but will evaluate daily for need  - urine output is decreased and creatinine continues to rise, unsure if he will be able to recover at this point, will discuss with Dr Christin Avila to determine if a dialysis line should be placed  - continue IVF with bicarbonate  2  Chronic Kidney Disease stage III- Baseline creatinine 1 9 in early 2016 but was 2 3 in July 2017   Suspected etiology from diabetic nephropathy   I do not feel a renal biopsy is warranted at this point   Previously followed with Dr Ricardo Garcia (last seen in 2/2016)  - UA: + glucose, trace blood (1-2 RBC), >300 protein  3  Hypertensive Urgency- BP improved to goal for this morning, avoid rapid changes in lowering BP  - holding lisinopril  - cardene drip managed by primary team  - currently on hydralazine 50mg TID and carvedilol 25mg BID (home dose)  - increase hydralazine to 75mg TID  - consider adding amlodipine also to regimen  4  Proteinuria- likely secondary to diabetic nephropathy  - SPEP and UPEP pending  5  Diabetes mellitus, type 1- diagnosed around the age of 2  5  Bilateral Right Gaze Palsy / ? Demyelinating Disease / Cerebral Vascular Disease- neurology on board  - received gabutrol (MRI) on 1/22  - received IV contrast (CTA) on 1/22  7  Anemia- iron studies acceptable  8  BMD- phosphorus elevated, phoslo with meals started on 1/24  - vitamin D level 10 9, start ergocalciferol weekly x12 on 1/26    SUBJECTIVE:  Patient had some belching last night  Denies changes in appetite or metallic taste in mouth  Denies SOB  States he has urinary hesitancy  Denies noticing decreased urine output      OBJECTIVE:  Current Weight: Weight - Scale: 91 3 kg (201 lb 4 5 oz)  Vitals:    01/25/18 0155 01/25/18 0225 01/25/18 0425 01/25/18 0726   BP: (!) 194/88 (!) 173/95 169/94 (!) 175/87   BP Location: Right arm Left arm Left arm Left arm   Pulse: 86 83 84 90   Resp:    16   Temp:   98 6 °F (37 °C) 98 6 °F (37 °C)   TempSrc:    Oral   SpO2: 97% 98% 100% 97%   Weight:       Height:           Intake/Output Summary (Last 24 hours) at 01/25/18 0950  Last data filed at 01/25/18 0800   Gross per 24 hour   Intake          3474 17 ml   Output              825 ml   Net          2649 17 ml     General: NAD  Skin: no rash  HEENT: right gaze  Neck: supple  Chest: decreased breath sounds  Heart: RRR  Abdomen: soft, nt, nd  Extremities: no edema  Neuro: alert awake  Psych: mood and affect appropriate    Medications:    Current Facility-Administered Medications:     acetaminophen (TYLENOL) tablet 650 mg, 650 mg, Oral, Q4H PRN, Justina Keene, PA-C    atorvastatin (LIPITOR) tablet 40 mg, 40 mg, Oral, QPM, Justina Keene, PA-C, 40 mg at 01/24/18 1720    calcium acetate (PHOSLO) capsule 667 mg, 667 mg, Oral, TID With Meals, [employer] PA-C, 667 mg at 01/25/18 0903    carvedilol (COREG) tablet 25 mg, 25 mg, Oral, BID With Meals, The AURELIANO Saldivar-C, 25 mg at 01/25/18 0903    docusate sodium (COLACE) capsule 100 mg, 100 mg, Oral, BID, Justina Keene, PA-C, 100 mg at 01/25/18 9652    hydrALAZINE (APRESOLINE) injection 10 mg, 10 mg, Intravenous, Once, Rusty Fleming MD    hydrALAZINE (APRESOLINE) tablet 50 mg, 50 mg, Oral, TID, Justina Keene PA-C, 50 mg at 01/25/18 0903    insulin lispro (HumaLOG) 100 units/mL subcutaneous injection 1-6 Units, 1-6 Units, Subcutaneous, TID AC, 3 Units at 01/25/18 0904 **AND** Fingerstick Glucose (POCT), , , TID AC, Chavez Martinez MD    insulin lispro (HumaLOG) 100 units/mL subcutaneous injection 6 Units, 6 Units, Subcutaneous, TID With Meals, Chavez Martinez MD    niCARdipine (CARDENE) 25 mg (STANDARD CONCENTRATION) in sodium chloride 0 9% 250 mL, 1-15 mg/hr, Intravenous, Titrated, Justina Keene PA-C, Last Rate: 50 mL/hr at 01/25/18 0940, 5 mg/hr at 01/25/18 0940    ondansetron (ZOFRAN) injection 4 mg, 4 mg, Intravenous, Q6H PRN, Justina Keene PA-C, 4 mg at 01/25/18 0218    sodium bicarbonate 75 mEq in sodium chloride 0 45 % 1,000 mL infusion, 100 mL/hr, Intravenous, Continuous, Erika Green PA-C, Last Rate: 100 mL/hr at 01/25/18 0205, 100 mL/hr at 01/25/18 0205    Laboratory Results:    Results from last 7 days  Lab Units 01/25/18  0445 01/24/18  1534 01/24/18  0508 01/23/18  0450 01/22/18  1942 01/22/18  1015   WBC Thousand/uL  --   --  8 46 8 75  --  8 24   HEMOGLOBIN g/dL  --   --  8 9* 9 1*  --  10 5*   HEMATOCRIT %  --   --  26 0* 26 8*  --  30 8*   PLATELETS Thousands/uL  --   --  222 235  --  275   SODIUM mmol/L 135* 138 140 139 136 136   POTASSIUM mmol/L 4 8 5 0 4 4 4 2 4 8 5 3   CHLORIDE mmol/L 105 106 108 108 105 105   CO2 mmol/L 20* 21 20* 21 24 22   BUN mg/dL 52* 47* 47* 43* 43* 35*   CREATININE mg/dL 7 87* 7 36* 6 80* 4 57* 4 31* 3 74*   CALCIUM mg/dL 7 8* 7 7* 8 1* 8 3 8 7 8 9   MAGNESIUM mg/dL 2 1  --   --  2 0  --  2 0   PHOSPHORUS mg/dL 6 9*  --  6 5*  --   --   --    TOTAL PROTEIN g/dL  --  5 1*  --  5 4*  --  6 2*   GLUCOSE RANDOM mg/dL 179* 109 72 86 392* 360*

## 2018-01-26 ENCOUNTER — APPOINTMENT (INPATIENT)
Dept: RADIOLOGY | Facility: HOSPITAL | Age: 33
DRG: 045 | End: 2018-01-26
Payer: COMMERCIAL

## 2018-01-26 PROBLEM — R11.0 NAUSEA: Status: ACTIVE | Noted: 2018-01-26

## 2018-01-26 LAB
ANION GAP SERPL CALCULATED.3IONS-SCNC: 12 MMOL/L (ref 4–13)
ANION GAP SERPL CALCULATED.3IONS-SCNC: 12 MMOL/L (ref 4–13)
APPEARANCE CSF: NORMAL
APTT SCREEN TO CONFIRM RATIO: 1.07 RATIO (ref 0–1.4)
B2 GLYCOPROT1 IGA SER-ACNC: <9 GPI IGA UNITS (ref 0–25)
B2 GLYCOPROT1 IGG SER-ACNC: <9 GPI IGG UNITS (ref 0–20)
B2 GLYCOPROT1 IGM SER-ACNC: <9 GPI IGM UNITS (ref 0–32)
BASOPHILS # BLD AUTO: 0.06 THOUSANDS/ΜL (ref 0–0.1)
BASOPHILS NFR BLD AUTO: 1 % (ref 0–1)
BUN SERPL-MCNC: 59 MG/DL (ref 5–25)
BUN SERPL-MCNC: 62 MG/DL (ref 5–25)
C GATTII+NEOFOR DNA CSF QL NAA+NON-PROBE: NOT DETECTED
C-ANCA TITR SER IF: NORMAL TITER
CALCIUM SERPL-MCNC: 8 MG/DL (ref 8.3–10.1)
CALCIUM SERPL-MCNC: 8.1 MG/DL (ref 8.3–10.1)
CHLORIDE SERPL-SCNC: 105 MMOL/L (ref 100–108)
CHLORIDE SERPL-SCNC: 105 MMOL/L (ref 100–108)
CMV DNA CSF QL NAA+NON-PROBE: NOT DETECTED
CO2 SERPL-SCNC: 19 MMOL/L (ref 21–32)
CO2 SERPL-SCNC: 20 MMOL/L (ref 21–32)
CONFIRM APTT/NORMAL: 46.3 SEC (ref 0–55)
CREAT SERPL-MCNC: 8.2 MG/DL (ref 0.6–1.3)
CREAT SERPL-MCNC: 8.33 MG/DL (ref 0.6–1.3)
E COLI K1 DNA CSF QL NAA+NON-PROBE: NOT DETECTED
EOSINOPHIL # BLD AUTO: 0.26 THOUSAND/ΜL (ref 0–0.61)
EOSINOPHIL NFR BLD AUTO: 4 % (ref 0–6)
ERYTHROCYTE [DISTWIDTH] IN BLOOD BY AUTOMATED COUNT: 13.5 % (ref 11.6–15.1)
EV RNA CSF QL NAA+NON-PROBE: NOT DETECTED
GFR SERPL CREATININE-BSD FRML MDRD: 8 ML/MIN/1.73SQ M
GFR SERPL CREATININE-BSD FRML MDRD: 8 ML/MIN/1.73SQ M
GLUCOSE CSF-MCNC: 110 MG/DL (ref 50–80)
GLUCOSE SERPL-MCNC: 137 MG/DL (ref 65–140)
GLUCOSE SERPL-MCNC: 152 MG/DL (ref 65–140)
GLUCOSE SERPL-MCNC: 161 MG/DL (ref 65–140)
GLUCOSE SERPL-MCNC: 208 MG/DL (ref 65–140)
GLUCOSE SERPL-MCNC: 243 MG/DL (ref 65–140)
GLUCOSE SERPL-MCNC: 263 MG/DL (ref 65–140)
GLUCOSE SERPL-MCNC: 325 MG/DL (ref 65–140)
GP B STREP DNA CSF QL NAA+NON-PROBE: NOT DETECTED
GRAM STN SPEC: NORMAL
GRAM STN SPEC: NORMAL
HAEM INFLU DNA CSF QL NAA+NON-PROBE: NOT DETECTED
HCT VFR BLD AUTO: 24.1 % (ref 36.5–49.3)
HGB BLD-MCNC: 8.3 G/DL (ref 12–17)
HHV6 DNA CSF QL NAA+NON-PROBE: NOT DETECTED
HSV1 DNA CSF QL NAA+NON-PROBE: NOT DETECTED
HSV2 DNA CSF QL NAA+NON-PROBE: NOT DETECTED
L MONOCYTOG DNA CSF QL NAA+NON-PROBE: NOT DETECTED
LA PPP-IMP: NORMAL
LYMPHOCYTES # BLD AUTO: 1.31 THOUSANDS/ΜL (ref 0.6–4.47)
LYMPHOCYTES NFR BLD AUTO: 18 % (ref 14–44)
LYMPHOCYTES NFR CSF MANUAL: 68 %
MCH RBC QN AUTO: 31 PG (ref 26.8–34.3)
MCHC RBC AUTO-ENTMCNC: 34.4 G/DL (ref 31.4–37.4)
MCV RBC AUTO: 90 FL (ref 82–98)
MONOCYTES # BLD AUTO: 0.58 THOUSAND/ΜL (ref 0.17–1.22)
MONOCYTES NFR BLD AUTO: 8 % (ref 4–12)
MONOS+MACROS CSF MANUAL: 21 %
MYELOPEROXIDASE AB SER IA-ACNC: <9 U/ML (ref 0–9)
N MEN DNA CSF QL NAA+NON-PROBE: NOT DETECTED
NEUTROPHILS # BLD AUTO: 5.01 THOUSANDS/ΜL (ref 1.85–7.62)
NEUTROPHILS NFR CSF MANUAL: 11 %
NEUTS SEG NFR BLD AUTO: 69 % (ref 43–75)
P-ANCA ATYPICAL TITR SER IF: NORMAL TITER
P-ANCA TITR SER IF: NORMAL TITER
PARECHOVIRUS A RNA CSF QL NAA+NON-PROBE: NOT DETECTED
PLATELET # BLD AUTO: 217 THOUSANDS/UL (ref 149–390)
PMV BLD AUTO: 10 FL (ref 8.9–12.7)
POTASSIUM SERPL-SCNC: 4.8 MMOL/L (ref 3.5–5.3)
POTASSIUM SERPL-SCNC: 5 MMOL/L (ref 3.5–5.3)
PROT CSF-MCNC: 36 MG/DL (ref 15–45)
PROT S ACT/NOR PPP: 140 % (ref 63–140)
PROTEINASE3 AB SER IA-ACNC: <3.5 U/ML (ref 0–3.5)
RBC # BLD AUTO: 2.68 MILLION/UL (ref 3.88–5.62)
RBC # CSF MANUAL: 1 UL (ref 0–10)
RYE IGE QN: NEGATIVE
S PNEUM DNA CSF QL NAA+NON-PROBE: NOT DETECTED
SCREEN APTT: 43.6 SEC (ref 0–51.9)
SCREEN DRVVT: 42.5 SEC (ref 0–47)
SODIUM SERPL-SCNC: 136 MMOL/L (ref 136–145)
SODIUM SERPL-SCNC: 137 MMOL/L (ref 136–145)
THROMBIN TIME: 19.4 SEC (ref 0–23)
TOTAL CELLS COUNTED BLD: NO
TOTAL CELLS COUNTED SPEC: 19
TUBE # CSF: 4
VZV DNA CSF QL NAA+NON-PROBE: NOT DETECTED
WBC # BLD AUTO: 7.22 THOUSAND/UL (ref 4.31–10.16)
WBC # CSF AUTO: 1 /UL (ref 0–5)

## 2018-01-26 PROCEDURE — 82042 OTHER SOURCE ALBUMIN QUAN EA: CPT | Performed by: PHYSICIAN ASSISTANT

## 2018-01-26 PROCEDURE — 83873 ASSAY OF CSF PROTEIN: CPT | Performed by: PHYSICIAN ASSISTANT

## 2018-01-26 PROCEDURE — 99232 SBSQ HOSP IP/OBS MODERATE 35: CPT | Performed by: INTERNAL MEDICINE

## 2018-01-26 PROCEDURE — 87529 HSV DNA AMP PROBE: CPT | Performed by: PHYSICIAN ASSISTANT

## 2018-01-26 PROCEDURE — 80048 BASIC METABOLIC PNL TOTAL CA: CPT | Performed by: PHYSICIAN ASSISTANT

## 2018-01-26 PROCEDURE — 87496 CYTOMEG DNA AMP PROBE: CPT | Performed by: PHYSICIAN ASSISTANT

## 2018-01-26 PROCEDURE — 82784 ASSAY IGA/IGD/IGG/IGM EACH: CPT | Performed by: PHYSICIAN ASSISTANT

## 2018-01-26 PROCEDURE — 82164 ANGIOTENSIN I ENZYME TEST: CPT | Performed by: PHYSICIAN ASSISTANT

## 2018-01-26 PROCEDURE — 77003 FLUOROGUIDE FOR SPINE INJECT: CPT

## 2018-01-26 PROCEDURE — 82040 ASSAY OF SERUM ALBUMIN: CPT | Performed by: PHYSICIAN ASSISTANT

## 2018-01-26 PROCEDURE — 89050 BODY FLUID CELL COUNT: CPT | Performed by: PHYSICIAN ASSISTANT

## 2018-01-26 PROCEDURE — 84157 ASSAY OF PROTEIN OTHER: CPT | Performed by: PHYSICIAN ASSISTANT

## 2018-01-26 PROCEDURE — 83916 OLIGOCLONAL BANDS: CPT | Performed by: PHYSICIAN ASSISTANT

## 2018-01-26 PROCEDURE — 99233 SBSQ HOSP IP/OBS HIGH 50: CPT | Performed by: PHYSICIAN ASSISTANT

## 2018-01-26 PROCEDURE — 87532 HHV-6 DNA AMP PROBE: CPT | Performed by: PHYSICIAN ASSISTANT

## 2018-01-26 PROCEDURE — 87653 STREP B DNA AMP PROBE: CPT | Performed by: PHYSICIAN ASSISTANT

## 2018-01-26 PROCEDURE — 85025 COMPLETE CBC W/AUTO DIFF WBC: CPT | Performed by: INTERNAL MEDICINE

## 2018-01-26 PROCEDURE — 87798 DETECT AGENT NOS DNA AMP: CPT | Performed by: PHYSICIAN ASSISTANT

## 2018-01-26 PROCEDURE — 82948 REAGENT STRIP/BLOOD GLUCOSE: CPT

## 2018-01-26 PROCEDURE — 77003 FLUOROGUIDE FOR SPINE INJECT: CPT | Performed by: RADIOLOGY

## 2018-01-26 PROCEDURE — 87070 CULTURE OTHR SPECIMN AEROBIC: CPT | Performed by: PHYSICIAN ASSISTANT

## 2018-01-26 PROCEDURE — 62270 DX LMBR SPI PNXR: CPT | Performed by: RADIOLOGY

## 2018-01-26 PROCEDURE — 009U3ZX DRAINAGE OF SPINAL CANAL, PERCUTANEOUS APPROACH, DIAGNOSTIC: ICD-10-PCS | Performed by: INTERNAL MEDICINE

## 2018-01-26 PROCEDURE — 99231 SBSQ HOSP IP/OBS SF/LOW 25: CPT | Performed by: NURSE PRACTITIONER

## 2018-01-26 PROCEDURE — 87498 ENTEROVIRUS PROBE&REVRS TRNS: CPT | Performed by: PHYSICIAN ASSISTANT

## 2018-01-26 PROCEDURE — 62270 DX LMBR SPI PNXR: CPT

## 2018-01-26 PROCEDURE — 89051 BODY FLUID CELL COUNT: CPT | Performed by: PHYSICIAN ASSISTANT

## 2018-01-26 PROCEDURE — 82945 GLUCOSE OTHER FLUID: CPT | Performed by: PHYSICIAN ASSISTANT

## 2018-01-26 RX ORDER — HYDRALAZINE HYDROCHLORIDE 25 MG/1
100 TABLET, FILM COATED ORAL 3 TIMES DAILY
Status: DISCONTINUED | OUTPATIENT
Start: 2018-01-26 | End: 2018-01-29

## 2018-01-26 RX ORDER — PROMETHAZINE HYDROCHLORIDE 25 MG/ML
25 INJECTION, SOLUTION INTRAMUSCULAR; INTRAVENOUS EVERY 6 HOURS PRN
Status: DISCONTINUED | OUTPATIENT
Start: 2018-01-26 | End: 2018-02-05 | Stop reason: HOSPADM

## 2018-01-26 RX ORDER — HYDRALAZINE HYDROCHLORIDE 20 MG/ML
10 INJECTION INTRAMUSCULAR; INTRAVENOUS EVERY 6 HOURS PRN
Status: DISCONTINUED | OUTPATIENT
Start: 2018-01-26 | End: 2018-02-05 | Stop reason: HOSPADM

## 2018-01-26 RX ORDER — NIFEDIPINE 30 MG/1
30 TABLET, EXTENDED RELEASE ORAL DAILY
Status: DISCONTINUED | OUTPATIENT
Start: 2018-01-27 | End: 2018-01-29

## 2018-01-26 RX ORDER — LABETALOL HYDROCHLORIDE 5 MG/ML
10 INJECTION, SOLUTION INTRAVENOUS ONCE
Status: COMPLETED | OUTPATIENT
Start: 2018-01-26 | End: 2018-01-26

## 2018-01-26 RX ADMIN — CARVEDILOL 25 MG: 12.5 TABLET, FILM COATED ORAL at 08:00

## 2018-01-26 RX ADMIN — INSULIN LISPRO 3 UNITS: 100 INJECTION, SOLUTION INTRAVENOUS; SUBCUTANEOUS at 17:05

## 2018-01-26 RX ADMIN — DESMOPRESSIN ACETATE 22 MCG: 4 SOLUTION INTRAVENOUS at 09:44

## 2018-01-26 RX ADMIN — INSULIN LISPRO 1 UNITS: 100 INJECTION, SOLUTION INTRAVENOUS; SUBCUTANEOUS at 02:04

## 2018-01-26 RX ADMIN — HYDRALAZINE HYDROCHLORIDE 100 MG: 25 TABLET, FILM COATED ORAL at 15:32

## 2018-01-26 RX ADMIN — LABETALOL HYDROCHLORIDE 10 MG: 5 INJECTION, SOLUTION INTRAVENOUS at 04:20

## 2018-01-26 RX ADMIN — LABETALOL HYDROCHLORIDE 10 MG: 5 INJECTION, SOLUTION INTRAVENOUS at 04:18

## 2018-01-26 RX ADMIN — CALCIUM ACETATE 667 MG: 667 CAPSULE ORAL at 07:58

## 2018-01-26 RX ADMIN — CALCIUM ACETATE 667 MG: 667 CAPSULE ORAL at 11:44

## 2018-01-26 RX ADMIN — SODIUM CHLORIDE 5 MG/HR: 0.9 INJECTION, SOLUTION INTRAVENOUS at 06:43

## 2018-01-26 RX ADMIN — HYDRALAZINE HYDROCHLORIDE 100 MG: 25 TABLET, FILM COATED ORAL at 21:33

## 2018-01-26 RX ADMIN — INSULIN LISPRO 3 UNITS: 100 INJECTION, SOLUTION INTRAVENOUS; SUBCUTANEOUS at 12:42

## 2018-01-26 RX ADMIN — CARVEDILOL 25 MG: 12.5 TABLET, FILM COATED ORAL at 15:32

## 2018-01-26 RX ADMIN — LABETALOL HYDROCHLORIDE 10 MG: 5 INJECTION, SOLUTION INTRAVENOUS at 02:04

## 2018-01-26 RX ADMIN — ATORVASTATIN CALCIUM 40 MG: 40 TABLET, FILM COATED ORAL at 17:05

## 2018-01-26 RX ADMIN — AMLODIPINE BESYLATE 5 MG: 5 TABLET ORAL at 08:00

## 2018-01-26 RX ADMIN — INSULIN GLARGINE 15 UNITS: 100 INJECTION, SOLUTION SUBCUTANEOUS at 21:33

## 2018-01-26 RX ADMIN — SODIUM CHLORIDE 1000 MG: 0.9 INJECTION, SOLUTION INTRAVENOUS at 21:39

## 2018-01-26 RX ADMIN — HYDRALAZINE HYDROCHLORIDE 10 MG: 20 INJECTION INTRAMUSCULAR; INTRAVENOUS at 11:43

## 2018-01-26 RX ADMIN — ERGOCALCIFEROL 50000 UNITS: 1.25 CAPSULE ORAL at 08:00

## 2018-01-26 RX ADMIN — LABETALOL HYDROCHLORIDE 10 MG: 5 INJECTION, SOLUTION INTRAVENOUS at 12:18

## 2018-01-26 RX ADMIN — INSULIN LISPRO 1 UNITS: 100 INJECTION, SOLUTION INTRAVENOUS; SUBCUTANEOUS at 21:34

## 2018-01-26 RX ADMIN — HYDRALAZINE HYDROCHLORIDE 10 MG: 20 INJECTION INTRAMUSCULAR; INTRAVENOUS at 03:22

## 2018-01-26 RX ADMIN — PROMETHAZINE HYDROCHLORIDE 25 MG: 25 INJECTION INTRAMUSCULAR; INTRAVENOUS at 09:16

## 2018-01-26 RX ADMIN — CALCIUM ACETATE 667 MG: 667 CAPSULE ORAL at 15:31

## 2018-01-26 RX ADMIN — HYDRALAZINE HYDROCHLORIDE 75 MG: 25 TABLET, FILM COATED ORAL at 08:00

## 2018-01-26 RX ADMIN — HYDRALAZINE HYDROCHLORIDE 10 MG: 20 INJECTION INTRAMUSCULAR; INTRAVENOUS at 17:35

## 2018-01-26 RX ADMIN — ONDANSETRON 4 MG: 2 INJECTION INTRAMUSCULAR; INTRAVENOUS at 06:36

## 2018-01-26 RX ADMIN — LABETALOL HYDROCHLORIDE 10 MG: 5 INJECTION, SOLUTION INTRAVENOUS at 19:48

## 2018-01-26 NOTE — PROGRESS NOTES
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   Risk factors include hypertensive urgency, DM1 and significant smoker half to 1/2 packs a day  · Punctate CVA in velia noted on MRI (see addendum from yesterday)  · Demyelination noted on MRI  · Needs LP to eval for MS  · Outpatient follow up with opthalmology for retinopathy  · Titrate nicardipine drip to ensure gradual reduction in BP  · Patching eyes q2h  · Continue statin  · Antiplatelets held for LP today  · PT/OT following        Acute renal failure superimposed on stage 2 chronic kidney disease (HCC)   Assessment & Plan    · Present upon admission, baseline 1 9-2 2  · Suspect ARF due to contrast induced nephropathy and hypertensive urgency with ACEI and NSAID use  · Bicarb infusion per Nephrology completed  · Significant proteinuria noted on Ua, SPEP UPEP pending  · Oliguric, but urine output in increased  · Renal ultrasound is unremarkbale  · low phos, low K diet  · Nephrology following and evaluating daily for need of HD        Hypertensive urgency   Assessment & Plan    · Likely due to CVA  · Continue coreg, hydralazine increased, amlodpine added, PRN nicardipine drip        Type 1 diabetes mellitus with diabetic nephropathy, with long-term current use of insulin ([facility])   Assessment & Plan    · Glucose stabilized  · A1c is 6 4  · Continue basal bolus insulin            VTE Pharmacologic Prophylaxis: Pharmacologic: Pharmacologic VTE Prophylaxis contraindicated due to pending LP  Mechanical VTE Prophylaxis in Place: Yes    Patient Centered Rounds: I have performed bedside rounds with nursing staff today  Discussions with Specialists or Other Care Team Provider: nephrology    Education and Discussions with Family / Patient: family at bedside, discussed plan of care    Time Spent for Care: 20 minutes  More than 50% of total time spent on counseling and coordination of care as described above  Current Length of Stay: 4 day(s)    Current Patient Status: Inpatient   Certification Statement: The patient will continue to require additional inpatient hospital stay due to ARF    Discharge Plan: home when stable    Code Status: Level 1 - Full Code      Subjective:   Nausea, without vomiting for diarrhea  Good urine output per patient, no change in vision  Objective:     Vitals:   Temp (24hrs), Av 3 °F (36 8 °C), Min:98 °F (36 7 °C), Max:98 7 °F (37 1 °C)    HR:  [76-85] 85  Resp:  [0-30] 15  BP: (145-188)/() 186/91  SpO2:  [97 %-99 %] 97 %  Body mass index is 32 5 kg/m²  Input and Output Summary (last 24 hours): Intake/Output Summary (Last 24 hours) at 18 0923  Last data filed at 18 0800   Gross per 24 hour   Intake          1553 33 ml   Output             2395 ml   Net          -841 67 ml       Physical Exam:     Physical Exam   Constitutional: He is oriented to person, place, and time  He appears well-developed and well-nourished  HENT:   Head: Normocephalic and atraumatic  Eyes: Pupils are equal, round, and reactive to light  No scleral icterus  Left gaze palsey   Neck: Neck supple  No JVD present  Cardiovascular: Normal rate and regular rhythm  Pulmonary/Chest: He has no wheezes  He has no rales  Abdominal: Soft  He exhibits no distension  There is no tenderness  Musculoskeletal: Normal range of motion  He exhibits edema  He exhibits no tenderness  Neurological: He is alert and oriented to person, place, and time  A cranial nerve deficit is present  Left facial droop   Skin: Skin is warm and dry  No rash noted  Additional Data:     Labs:      Results from last 7 days  Lab Units 01/26/18  0432   WBC Thousand/uL 7 22   HEMOGLOBIN g/dL 8 3*   HEMATOCRIT % 24 1*   PLATELETS Thousands/uL 217   NEUTROS PCT % 69   LYMPHS PCT % 18   MONOS PCT % 8   EOS PCT % 4       Results from last 7 days  Lab Units 01/26/18  0432  01/24/18  1534   SODIUM mmol/L 137  < > 138   POTASSIUM mmol/L 4 8  < > 5 0   CHLORIDE mmol/L 105  < > 106   CO2 mmol/L 20*  < > 21   BUN mg/dL 59*  < > 47*   CREATININE mg/dL 8 33*  < > 7 36*   CALCIUM mg/dL 8 1*  < > 7 7*   TOTAL PROTEIN g/dL  --   --  5 1*   BILIRUBIN TOTAL mg/dL  --   --  0 20   ALK PHOS U/L  --   --  85   ALT U/L  --   --  15   AST U/L  --   --  16   GLUCOSE RANDOM mg/dL 137  < > 109   < > = values in this interval not displayed  Results from last 7 days  Lab Units 01/25/18  1202   INR  0 95       * I Have Reviewed All Lab Data Listed Above  * Additional Pertinent Lab Tests Reviewed:  All Labs Within Last 24 Hours Reviewed        Recent Cultures (last 7 days):           Last 24 Hours Medication List:     amLODIPine 5 mg Oral Daily   atorvastatin 40 mg Oral QPM   calcium acetate 667 mg Oral TID With Meals   carvedilol 25 mg Oral BID With Meals   desmopressin 0 3 mcg/kg (Ideal) Intravenous Once   docusate sodium 100 mg Oral BID   ergocalciferol 50,000 Units Oral Weekly   hydrALAZINE 10 mg Intravenous Once   hydrALAZINE 75 mg Oral TID   insulin glargine 15 Units Subcutaneous Daily With Breakfast   insulin glargine 15 Units Subcutaneous HS   insulin lispro 1-5 Units Subcutaneous HS   insulin lispro 1-5 Units Subcutaneous 0200   insulin lispro 1-6 Units Subcutaneous TID AC   insulin lispro 10 Units Subcutaneous TID With Meals        Today, Patient Was Seen By: Lorraine Price MD    ** Please Note: Dictation voice to text software may have been used in the creation of this document   **

## 2018-01-26 NOTE — PROGRESS NOTES
NEPHROLOGY PROGRESS NOTE   Marisa Mckeon 27 y o  male MRN: [id number]  Unit/Bed#:  Encounter: [id number]  Reason for Consult: LLUVIA/CKD    ASSESSMENT/PLAN:  1  Acute Kidney Injury vs progression of chronic kidney disease- Likely etiology secondary to hypertensive emergency complicated by IV contrast load (1/22) +/- contrast induced nephropathy in the setting of lisinopril +/- mild NSAID use  - avoid IV contrast and gadolinium until renal function stabilizes  - avoid nephrotoxics (NSAIDs & lisinopril)  - avoid hypotension  - renal ultrasound normal  - CPK level 161 (acceptable)  - PVR okay at 112ml  - monitor strict intake/output/weights  - no urgent need for dialysis but will evaluate daily for need  - creatinine rate of rise has improved (1 in 24hrs to 0 5 in 24hrs)  - urine output improved significantly  - minimal uremic symptoms  - off IVF/bicarb drip since 1/25  2  Chronic Kidney Disease stage III- Baseline creatinine 1 9 in early 2016 but was 2 3 in July 2017   Suspected etiology from diabetic nephropathy   I do not feel a renal biopsy is warranted at this point   Previously followed with Dr Marianna Stuart (last seen in 2/2016)  - UA: + glucose, trace blood (1-2 RBC), >300 protein  3  Hypertensive Urgency- BP stable but not at goal  - holding lisinopril  - cardene drip managed by primary team  - currently on hydralazine 75mg TID and carvedilol 25mg BID and amlodipine 5mg daily (started 1/25)  - increase hydralazine to 100mg TID  4  Proteinuria- likely secondary to diabetic nephropathy  - SPEP and UPEP pending  5  Diabetes mellitus, type 1- diagnosed around the age of 2  5  Bilateral Right Gaze Palsy / ? Demyelinating Disease / Cerebral Vascular Disease- neurology on board  - received gabutrol (MRI) on 1/22  - received IV contrast (CTA) on 1/22  7  Anemia- iron studies acceptable  8   BMD- phosphorus elevated, phoslo with meals started on 1/24  - vitamin D level 10 9, start ergocalciferol weekly x12 on 1/26  9  Mildly low bicarbonate- consider bicarbonate tablets if bicarb worsens    SUBJECTIVE:  Patient is nauseous this am   Urine output improved as well as hesitancy      OBJECTIVE:  Current Weight: Weight - Scale: 106 kg (233 lb 0 4 oz)  Vitals:    01/26/18 0259 01/26/18 0312 01/26/18 0600 01/26/18 0700   BP: (!) 188/97   (!) 186/91   BP Location:       Pulse:  82  85   Resp:  16  15   Temp:  98 4 °F (36 9 °C)  98 3 °F (36 8 °C)   TempSrc:  Oral  Oral   SpO2:  97%  97%   Weight:   106 kg (233 lb 0 4 oz)    Height:           Intake/Output Summary (Last 24 hours) at 01/26/18 2010  Last data filed at 01/26/18 0800   Gross per 24 hour   Intake          1553 33 ml   Output             2395 ml   Net          -841 67 ml     General: NAD  Skin: no rash  HEENT: normocephalic atraumatic  Neck: supple  Chest: decreased breath sounds  Heart: RRR  Abdomen: soft, nt, nd  Extremities: trace bilateral edema  Neuro: alert awake  Psych: mood and affect appropriate    Medications:    Current Facility-Administered Medications:     acetaminophen (TYLENOL) tablet 650 mg, 650 mg, Oral, Q4H PRN, Justina Keene PA-C    amLODIPine (NORVASC) tablet 5 mg, 5 mg, Oral, Daily, Sanya Ayala MD, 5 mg at 01/26/18 0800    atorvastatin (LIPITOR) tablet 40 mg, 40 mg, Oral, QPM, Justina Keene PA-C, 40 mg at 01/25/18 1751    calcium acetate (PHOSLO) capsule 667 mg, 667 mg, Oral, TID With Meals, Johana Saenz PA-C, 667 mg at 01/26/18 0758    carvedilol (COREG) tablet 25 mg, 25 mg, Oral, BID With Meals, AURELIANO Liu-CLOTILDE, 25 mg at 01/26/18 0800    desmopressin (DDAVP) 22 mcg in sodium chloride 0 9 % 50 mL IVPB, 0 3 mcg/kg (Ideal), Intravenous, Once, TumblrSAV    docusate sodium (COLACE) capsule 100 mg, 100 mg, Oral, BID, Justina Keene PA-C, 100 mg at 01/25/18 0102    ergocalciferol (VITAMIN D2) capsule 50,000 Units, 50,000 Units, Oral, Weekly, Erika Green PA-C, 50,000 Units at 01/26/18 0800    hydrALAZINE (APRESOLINE) injection 10 mg, 10 mg, Intravenous, Once, Vania Rascon MD    hydrALAZINE (APRESOLINE) injection 10 mg, 10 mg, Intravenous, Q6H PRN, [facility], SAV, 10 mg at 01/26/18 0322    hydrALAZINE (APRESOLINE) tablet 75 mg, 75 mg, Oral, TID, AURELIANO Sauceda-C, 75 mg at 01/26/18 0800    insulin glargine (LANTUS) subcutaneous injection 15 Units, 15 Units, Subcutaneous, Daily With Breakfast, Quita Nageotte, MD    insulin glargine (LANTUS) subcutaneous injection 15 Units, 15 Units, Subcutaneous, HS, Quita Nageotte, MD, 15 Units at 01/25/18 2258    insulin lispro (HumaLOG) 100 units/mL subcutaneous injection 1-5 Units, 1-5 Units, Subcutaneous, HS, Quita Nageotte, MD, 1 Units at 01/25/18 2259    insulin lispro (HumaLOG) 100 units/mL subcutaneous injection 1-5 Units, 1-5 Units, Subcutaneous, 0200, Quita Nageotte, MD, 1 Units at 01/26/18 0204    insulin lispro (HumaLOG) 100 units/mL subcutaneous injection 1-6 Units, 1-6 Units, Subcutaneous, TID AC, 4 Units at 01/25/18 1752 **AND** Fingerstick Glucose (POCT), , , TID AC, Vania Rascon MD    insulin lispro (HumaLOG) 100 units/mL subcutaneous injection 10 Units, 10 Units, Subcutaneous, TID With Meals, Quita Nageotte, MD, 10 Units at 01/25/18 1753    labetalol (NORMODYNE) injection 10 mg, 10 mg, Intravenous, Q4H PRN, Justina Keene PA-C, 10 mg at 01/26/18 0418    niCARdipine (CARDENE) 25 mg (STANDARD CONCENTRATION) in sodium chloride 0 9% 250 mL, 1-15 mg/hr, Intravenous, Titrated, Justina Keene PA-C, Stopped at 01/26/18 0837    ondansetron (ZOFRAN) injection 4 mg, 4 mg, Intravenous, Q6H PRN, Justina Keene PA-C, 4 mg at 01/26/18 0636    promethazine (PHENERGAN) injection 25 mg, 25 mg, Intravenous, Q6H PRN, Vania Rascon MD, 25 mg at 01/26/18 0916    Laboratory Results:    Results from last 7 days  Lab Units 01/26/18  0432 01/25/18  0445 01/24/18  1534 01/24/18  0508 01/23/18  0450 01/22/18  1942 01/22/18  1015   WBC Thousand/uL 7 22  --   --  8 46 8 75  --  8 24   HEMOGLOBIN g/dL 8 3*  --   --  8 9* 9 1*  --  10 5*   HEMATOCRIT % 24 1*  --   --  26 0* 26 8*  --  30 8*   PLATELETS Thousands/uL 217  --   --  222 235  --  275   SODIUM mmol/L 137 135* 138 140 139 136 136   POTASSIUM mmol/L 4 8 4 8 5 0 4 4 4 2 4 8 5 3   CHLORIDE mmol/L 105 105 106 108 108 105 105   CO2 mmol/L 20* 20* 21 20* 21 24 22   BUN mg/dL 59* 52* 47* 47* 43* 43* 35*   CREATININE mg/dL 8 33* 7 87* 7 36* 6 80* 4 57* 4 31* 3 74*   CALCIUM mg/dL 8 1* 7 8* 7 7* 8 1* 8 3 8 7 8 9   MAGNESIUM mg/dL  --  2 1  --   --  2 0  --  2 0   PHOSPHORUS mg/dL  --  6 9*  --  6 5*  --   --   --    TOTAL PROTEIN g/dL  --   --  5 1*  --  5 4*  --  6 2*   GLUCOSE RANDOM mg/dL 137 179* 109 72 86 392* 360*

## 2018-01-26 NOTE — ASSESSMENT & PLAN NOTE
· Present upon admission, baseline 1 9-2 2  · Suspect ARF due to contrast induced nephropathy and hypertensive urgency with ACEI and NSAID use  · Bicarb infusion per Nephrology completed  · Significant proteinuria noted on Ua, SPEP UPEP pending  · Oliguric, but urine output in increased  · Renal ultrasound is unremarkbale  · low phos, low K diet  · Nephrology following and evaluating daily for need of HD

## 2018-01-26 NOTE — BRIEF OP NOTE (RAD/CATH)
Lumbar Puncture left L2-3  Procedure Note    PATIENT NAME: Terence Clark  : 1985  MRN: [id number]     Pre-op Diagnosis:   1  CVA (cerebral vascular accident) ([address] )    2  Blurred vision    3  Chronic kidney disease    4  Diabetes mellitus type 1 ([facility] )    5  Hypertension    6  Cerebrovascular accident (CVA), unspecified mechanism ([facility] )    7  Optic neuritis due to multiple sclerosis ([facility] )    8  Hypertensive urgency    9  Acute renal failure with acute tubular necrosis superimposed on stage 2 chronic kidney disease ([address] )    10  Proteinuria      Post-op Diagnosis:   1  CVA (cerebral vascular accident) ([facility] [address] )    2  Blurred vision    3  Chronic kidney disease    4  Diabetes mellitus type 1 ([facility] [address] )    5  Hypertension    6  Cerebrovascular accident (CVA), unspecified mechanism ([facility] [address] [facility] )    7  Optic neuritis due to multiple sclerosis ([facility] )    8  Hypertensive urgency    9  Acute renal failure with acute tubular necrosis superimposed on stage 2 chronic kidney disease ([facility] [address] )    10  Proteinuria        Surgeon:   Miguel Mason MD  Assistants:     No qualified resident was available, Resident is only observing    Estimated Blood Loss: none  Findings:     Clear CSF returned from Left L2-3 interspace  Opening pressure slightly elevated at 23 mm H2O  This was reduced to 14 at closing       Specimens:  Clear CSF     Complications:  none    Anesthesia: Pascual Zarate MD     Date: 2018  Time: 10:35 AM

## 2018-01-26 NOTE — ASSESSMENT & PLAN NOTE
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

## 2018-01-26 NOTE — ASSESSMENT & PLAN NOTE
Abdominal exam in benign  No new medications except for amlodipine  Suspect nausea due to LLUVIA  Will add phenergan for nausea

## 2018-01-26 NOTE — PROGRESS NOTES
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- the etiology of his gaze palsy remains unknown  The constellation of his symptoms, coupled with his MRI findings and now with the tap findings will hopefully lead to a better clarification as to whether not this patient is experiencing a separate autoimmune process such as a demyelinating episode, verses is he experiencing the progression of cerebral vascular disease related to his significant risk stroke risk factors as noted below  At this time his protein from his tap is available and that is with normal limits, his glucose is elevated as 1 would expect given his diabetes his white and red counts are also flat none of his other parameters are available  At this time we have elected to start him on a high dose pulse steroid protocol  Will have his 1st dose tonight and 2nd and 3rd doses over the weekend we will follow with him and determine whether not he would benefit from a 5 doses versus just 3   2  Hypertensive malignancy and acute kidney failure with chronic kidney disease- being managed by internal medicine  3  Type 1 diabetes mellitus- endocrinologist notice appreciated      Plan:  I have discussed with the patient his parents at the bedside the results that we are hoping further clarification aware hoping to obtain of through his tap  Some as as outlined above with the high dose pulse steroids I have discussed this with his parents and we will get them started tonight  We will continue to follow his labs and his exam with you over the next several days  Hopefully he can be transitioned out to the floor      Subjective:   I do not have a headache at all on hungry is anything    ROS: 12 system cued query was essentially negative he denies any headache no changes in his vision no changes in his ocular movements by his own report  His parents report they see no change in his gaze as they speak with him  He has had no further symptom progression  Vitals: Blood pressure (!) 181/85, pulse 93, temperature 98 5 °F (36 9 °C), temperature source Oral, resp  rate 12, height 5' 10" (1 778 m), weight 106 kg (233 lb 0 4 oz), SpO2 97 %  ,Body mass index is 32 5 kg/m²  MEDS:    amLODIPine 5 mg Oral Daily   atorvastatin 40 mg Oral QPM   calcium acetate 667 mg Oral TID With Meals   carvedilol 25 mg Oral BID With Meals   docusate sodium 100 mg Oral BID   ergocalciferol 50,000 Units Oral Weekly   hydrALAZINE 10 mg Intravenous Once   hydrALAZINE 100 mg Oral TID   insulin glargine 15 Units Subcutaneous Daily With Breakfast   insulin glargine 15 Units Subcutaneous HS   insulin lispro 1-5 Units Subcutaneous HS   insulin lispro 1-5 Units Subcutaneous 0200   insulin lispro 1-6 Units Subcutaneous TID AC   insulin lispro 12 Units Subcutaneous TID With Meals    Now that his tap is done we will start him back on his Plavix and start him on a dose of steroids later on this evening  Physical Exam:    Adriane Rose seen in:  At the bedside chair  General appearance: alert, and alert  Extremities: atraumatic, no cyanosis or edema    Neurologic:   Mental status: Alert, oriented, thought content appropriate  CN:  Continues with a bilateral right gaze palsy on the EOMs  No further neurologic exam was conducted, the vast overwhelming portion of the visit was conducted speaking in answering questions with the patient and his family  Lab Results:   I have personally reviewed pertinent reports    , CBC:   Results from last 7 days  Lab Units 01/26/18  0432 01/24/18  0508 01/23/18  0450   WBC Thousand/uL 7 22 8 46 8 75   RBC Million/uL 2 68* 2 88* 2 96*   HEMOGLOBIN g/dL 8 3* 8 9* 9 1*   HEMATOCRIT % 24 1* 26 0* 26 8*   MCV fL 90 90 91   PLATELETS Thousands/uL 217 222 235   , BMP/CMP:   Results from last 7 days  Lab Units 01/26/18  1418 01/26/18  0432 01/25/18  0445   SODIUM mmol/L 136 137 135*   POTASSIUM mmol/L 5 0 4 8 4 8   CHLORIDE mmol/L 105 105 105   CO2 mmol/L 19* 20* 20*   ANION GAP mmol/L 12 12 10   BUN mg/dL 62* 59* 52*   CREATININE mg/dL 8 20* 8 33* 7 87*   GLUCOSE RANDOM mg/dL 325* 137 179*   CALCIUM mg/dL 8 0* 8 1* 7 8*   AST U/L  --   --   --    ALT U/L  --   --   --    ALK PHOS U/L  --   --   --    TOTAL PROTEIN g/dL  --   --   --    BILIRUBIN TOTAL mg/dL  --   --   --    EGFR ml/min/1 73sq m 8 8 8   Vitamin B12:   , HgBA1C:   Results from last 7 days  Lab Units 01/23/18  0450   HEMOGLOBIN A1C % 6 4*   , TSH:   Results from last 7 days  Lab Units 01/23/18  0450   TSH 3RD GENERATON uIU/mL 2 208   His CSF studies today demonstrate a protein which is within normal limits  Glucose is mildly elevated  Imaging Studies: I have personally reviewed pertinent films in PACS and films were all reviewed  Counseling/Coordination of Care  Total time spent today 45 minutes  Greater than 50% of total time was spent with the patient and / or family counseling and / or coordination of care  A description of the counseling / coordination of care: All of the above was discussed with the patient his family  Dictation voice to text software has been used in the creation of this document  Please consider this in light of any contextual or grammatical errors

## 2018-01-26 NOTE — NUTRITION
01/26/18 1019   Assessment   Timepoint Nutrition Review   Labs   List Completed Labs (1/26 K 4 8, BUN 59, Creat 8 33, Ca 8 1  1/25Phos 6 9  Meds reviewed)   Feeding Route   PO Independent   Adequacy of Intake   Nutrition Modality PO  (CCD Level 2, 2gram Potassium ,Low Phosphorus)   Intake Meals %   Estimated Calorie Intake %   Estimated Protein Intake  %   Estimated Fluid Intake %   Estimated calorie intake compared to estimated need Meeting estimated calorie needs, exceeding protein needs   PES Statement   Problem Clinical   Biochemical (2) Altered nutrition-related laboratory values (specify) NC-2 2   Related to Other (comment)  (physiological events)   As evidenced by: Abnormal lab (specify)  (Creat,Phosphorus)   Patient Nutrition Goals   Goal other (comment);glucose in range  (Improved Creat and Phosphorus)   Goal Status revised   Timeframe to complete goal by next f/u   Recommendations/Interventions   Summary Patient with increasing Creat level with Phos level elevated  K has improved   Spoke with PA, will adjust diet to CCD Level 2, Pre Renal   Malnutrition/BMI Present No   Interventions Diet: order adjustment   Nutrition Recommendations Other (specify)  (Provide CCD Level 2, Pre Renal Diet)   Nutrition Complexity Risk   Nutrition complexity level Moderate risk   Nutrition review: 02/02/18   Follow up date 01/30/18  (chk Phos,Creat,K)

## 2018-01-27 PROBLEM — R11.0 NAUSEA: Status: RESOLVED | Noted: 2018-01-26 | Resolved: 2018-01-27

## 2018-01-27 LAB
ANION GAP SERPL CALCULATED.3IONS-SCNC: 11 MMOL/L (ref 4–13)
BASOPHILS # BLD MANUAL: 0 THOUSAND/UL (ref 0–0.1)
BASOPHILS NFR MAR MANUAL: 0 % (ref 0–1)
BUN SERPL-MCNC: 60 MG/DL (ref 5–25)
CALCIUM SERPL-MCNC: 8.4 MG/DL (ref 8.3–10.1)
CHLORIDE SERPL-SCNC: 105 MMOL/L (ref 100–108)
CO2 SERPL-SCNC: 21 MMOL/L (ref 21–32)
CREAT SERPL-MCNC: 7.38 MG/DL (ref 0.6–1.3)
EOSINOPHIL # BLD MANUAL: 0 THOUSAND/UL (ref 0–0.4)
EOSINOPHIL NFR BLD MANUAL: 0 % (ref 0–6)
ERYTHROCYTE [DISTWIDTH] IN BLOOD BY AUTOMATED COUNT: 13.5 % (ref 11.6–15.1)
GFR SERPL CREATININE-BSD FRML MDRD: 9 ML/MIN/1.73SQ M
GLUCOSE SERPL-MCNC: 179 MG/DL (ref 65–140)
GLUCOSE SERPL-MCNC: 192 MG/DL (ref 65–140)
GLUCOSE SERPL-MCNC: 265 MG/DL (ref 65–140)
GLUCOSE SERPL-MCNC: 347 MG/DL (ref 65–140)
GLUCOSE SERPL-MCNC: 482 MG/DL (ref 65–140)
GLUCOSE SERPL-MCNC: 560 MG/DL (ref 65–140)
HCT VFR BLD AUTO: 25.4 % (ref 36.5–49.3)
HGB BLD-MCNC: 8.6 G/DL (ref 12–17)
LYMPHOCYTES # BLD AUTO: 0.3 THOUSAND/UL (ref 0.6–4.47)
LYMPHOCYTES # BLD AUTO: 5 % (ref 14–44)
MCH RBC QN AUTO: 30.5 PG (ref 26.8–34.3)
MCHC RBC AUTO-ENTMCNC: 33.9 G/DL (ref 31.4–37.4)
MCV RBC AUTO: 90 FL (ref 82–98)
MONOCYTES # BLD AUTO: 0.06 THOUSAND/UL (ref 0–1.22)
MONOCYTES NFR BLD: 1 % (ref 4–12)
NEUTROPHILS # BLD MANUAL: 5.65 THOUSAND/UL (ref 1.85–7.62)
NEUTS BAND NFR BLD MANUAL: 2 % (ref 0–8)
NEUTS SEG NFR BLD AUTO: 92 % (ref 43–75)
PLATELET # BLD AUTO: 228 THOUSANDS/UL (ref 149–390)
PLATELET BLD QL SMEAR: ADEQUATE
PMV BLD AUTO: 10.9 FL (ref 8.9–12.7)
POTASSIUM SERPL-SCNC: 5 MMOL/L (ref 3.5–5.3)
RBC # BLD AUTO: 2.82 MILLION/UL (ref 3.88–5.62)
SODIUM SERPL-SCNC: 137 MMOL/L (ref 136–145)
TOTAL CELLS COUNTED SPEC: 100
WBC # BLD AUTO: 6.01 THOUSAND/UL (ref 4.31–10.16)

## 2018-01-27 PROCEDURE — 82947 ASSAY GLUCOSE BLOOD QUANT: CPT | Performed by: INTERNAL MEDICINE

## 2018-01-27 PROCEDURE — 99232 SBSQ HOSP IP/OBS MODERATE 35: CPT | Performed by: INTERNAL MEDICINE

## 2018-01-27 PROCEDURE — 82948 REAGENT STRIP/BLOOD GLUCOSE: CPT

## 2018-01-27 PROCEDURE — 99233 SBSQ HOSP IP/OBS HIGH 50: CPT | Performed by: NURSE PRACTITIONER

## 2018-01-27 PROCEDURE — 99233 SBSQ HOSP IP/OBS HIGH 50: CPT | Performed by: INTERNAL MEDICINE

## 2018-01-27 PROCEDURE — 85007 BL SMEAR W/DIFF WBC COUNT: CPT | Performed by: INTERNAL MEDICINE

## 2018-01-27 PROCEDURE — 85027 COMPLETE CBC AUTOMATED: CPT | Performed by: INTERNAL MEDICINE

## 2018-01-27 PROCEDURE — 80048 BASIC METABOLIC PNL TOTAL CA: CPT | Performed by: INTERNAL MEDICINE

## 2018-01-27 RX ORDER — LORAZEPAM 0.5 MG/1
0.5 TABLET ORAL EVERY 8 HOURS PRN
Status: DISCONTINUED | OUTPATIENT
Start: 2018-01-27 | End: 2018-02-05 | Stop reason: HOSPADM

## 2018-01-27 RX ORDER — PANTOPRAZOLE SODIUM 40 MG/1
40 TABLET, DELAYED RELEASE ORAL
Status: DISCONTINUED | OUTPATIENT
Start: 2018-01-28 | End: 2018-01-27

## 2018-01-27 RX ORDER — DIPHENHYDRAMINE HCL 25 MG
25 TABLET ORAL
Status: DISCONTINUED | OUTPATIENT
Start: 2018-01-27 | End: 2018-02-05 | Stop reason: HOSPADM

## 2018-01-27 RX ORDER — PANTOPRAZOLE SODIUM 20 MG/1
20 TABLET, DELAYED RELEASE ORAL
Status: DISCONTINUED | OUTPATIENT
Start: 2018-01-27 | End: 2018-01-27

## 2018-01-27 RX ORDER — INSULIN GLARGINE 100 [IU]/ML
20 INJECTION, SOLUTION SUBCUTANEOUS
Status: DISCONTINUED | OUTPATIENT
Start: 2018-01-27 | End: 2018-01-28

## 2018-01-27 RX ORDER — INSULIN GLARGINE 100 [IU]/ML
20 INJECTION, SOLUTION SUBCUTANEOUS
Status: DISCONTINUED | OUTPATIENT
Start: 2018-01-28 | End: 2018-01-28

## 2018-01-27 RX ORDER — INSULIN GLARGINE 100 [IU]/ML
20 INJECTION, SOLUTION SUBCUTANEOUS
Status: DISCONTINUED | OUTPATIENT
Start: 2018-01-27 | End: 2018-01-27

## 2018-01-27 RX ORDER — CLOPIDOGREL BISULFATE 75 MG/1
75 TABLET ORAL DAILY
Status: DISCONTINUED | OUTPATIENT
Start: 2018-01-27 | End: 2018-02-05 | Stop reason: HOSPADM

## 2018-01-27 RX ORDER — PANTOPRAZOLE SODIUM 40 MG/1
40 TABLET, DELAYED RELEASE ORAL
Status: DISCONTINUED | OUTPATIENT
Start: 2018-01-27 | End: 2018-01-29

## 2018-01-27 RX ORDER — HEPARIN SODIUM 5000 [USP'U]/ML
5000 INJECTION, SOLUTION INTRAVENOUS; SUBCUTANEOUS EVERY 8 HOURS SCHEDULED
Status: DISCONTINUED | OUTPATIENT
Start: 2018-01-27 | End: 2018-02-05 | Stop reason: HOSPADM

## 2018-01-27 RX ADMIN — INSULIN LISPRO 3 UNITS: 100 INJECTION, SOLUTION INTRAVENOUS; SUBCUTANEOUS at 08:18

## 2018-01-27 RX ADMIN — HEPARIN SODIUM 5000 UNITS: 5000 INJECTION, SOLUTION INTRAVENOUS; SUBCUTANEOUS at 20:27

## 2018-01-27 RX ADMIN — PANTOPRAZOLE SODIUM 40 MG: 40 TABLET, DELAYED RELEASE ORAL at 13:31

## 2018-01-27 RX ADMIN — CARVEDILOL 25 MG: 12.5 TABLET, FILM COATED ORAL at 08:18

## 2018-01-27 RX ADMIN — HYDRALAZINE HYDROCHLORIDE 100 MG: 25 TABLET, FILM COATED ORAL at 20:27

## 2018-01-27 RX ADMIN — SODIUM CHLORIDE 10 UNITS/HR: 9 INJECTION, SOLUTION INTRAVENOUS at 20:53

## 2018-01-27 RX ADMIN — INSULIN GLARGINE 20 UNITS: 100 INJECTION, SOLUTION SUBCUTANEOUS at 20:53

## 2018-01-27 RX ADMIN — INSULIN LISPRO 1 UNITS: 100 INJECTION, SOLUTION INTRAVENOUS; SUBCUTANEOUS at 02:51

## 2018-01-27 RX ADMIN — HYDRALAZINE HYDROCHLORIDE 10 MG: 20 INJECTION INTRAMUSCULAR; INTRAVENOUS at 20:27

## 2018-01-27 RX ADMIN — CALCIUM ACETATE 667 MG: 667 CAPSULE ORAL at 15:34

## 2018-01-27 RX ADMIN — INSULIN GLARGINE 15 UNITS: 100 INJECTION, SOLUTION SUBCUTANEOUS at 08:18

## 2018-01-27 RX ADMIN — LABETALOL HYDROCHLORIDE 10 MG: 5 INJECTION, SOLUTION INTRAVENOUS at 17:02

## 2018-01-27 RX ADMIN — LABETALOL HYDROCHLORIDE 10 MG: 5 INJECTION, SOLUTION INTRAVENOUS at 22:10

## 2018-01-27 RX ADMIN — CLOPIDOGREL BISULFATE 75 MG: 75 TABLET ORAL at 11:38

## 2018-01-27 RX ADMIN — CARVEDILOL 25 MG: 12.5 TABLET, FILM COATED ORAL at 15:34

## 2018-01-27 RX ADMIN — INSULIN LISPRO 6 UNITS: 100 INJECTION, SOLUTION INTRAVENOUS; SUBCUTANEOUS at 11:39

## 2018-01-27 RX ADMIN — HYDRALAZINE HYDROCHLORIDE 100 MG: 25 TABLET, FILM COATED ORAL at 08:18

## 2018-01-27 RX ADMIN — CALCIUM ACETATE 667 MG: 667 CAPSULE ORAL at 08:18

## 2018-01-27 RX ADMIN — SODIUM CHLORIDE 1000 MG: 0.9 INJECTION, SOLUTION INTRAVENOUS at 09:16

## 2018-01-27 RX ADMIN — CALCIUM ACETATE 667 MG: 667 CAPSULE ORAL at 11:38

## 2018-01-27 RX ADMIN — HYDRALAZINE HYDROCHLORIDE 100 MG: 25 TABLET, FILM COATED ORAL at 15:34

## 2018-01-27 RX ADMIN — ATORVASTATIN CALCIUM 40 MG: 40 TABLET, FILM COATED ORAL at 17:00

## 2018-01-27 RX ADMIN — NIFEDIPINE 30 MG: 30 TABLET, FILM COATED, EXTENDED RELEASE ORAL at 08:18

## 2018-01-27 RX ADMIN — HEPARIN SODIUM 5000 UNITS: 5000 INJECTION, SOLUTION INTRAVENOUS; SUBCUTANEOUS at 13:31

## 2018-01-27 RX ADMIN — INSULIN LISPRO 20 UNITS: 100 INJECTION, SOLUTION INTRAVENOUS; SUBCUTANEOUS at 16:56

## 2018-01-27 NOTE — ASSESSMENT & PLAN NOTE
· Likely due to CVA  · Continue coreg and hydralazine  · Changed amlodipine to nifedipine  · BP improved this AM  · Continue to monitor

## 2018-01-27 NOTE — PROGRESS NOTES
Called by nurse regarding high blood glucose - pt started on high dose steroids yesterday   For now increase premeal humalog to 18 units   Coverage scale to higher alogorithm   Increase lantus to 20 units twice daily     Recheck f s at 8 pm if not improving , will start IV insulin infusion

## 2018-01-27 NOTE — PROGRESS NOTES
NEPHROLOGY PROGRESS NOTE   Som Mckeon 27 y o  male MRN: [id number]  Unit/Bed#:  Encounter: [id number]  Reason for Consult:  Acute on chronic kidney injury    ASSESSMENT and PLAN:    75-year-old male with a past medical history of CVA, type 1 diabetes, HTN, smoker, hyperlipidemia, CKD lost to follow-up who presents on 01/22 with change in vision   Patient was found to have hypertensive emergency and initially stroke alert was also called   CT revealed a suspected brainstem infarct and therefore the patient underwent a CT angiogram with contrast which did not show any acute hemorrhage or infarct   Patient was subsequently transferred to elevated care in the ICU for initiation of Cardene drip   Nephrology is on board for elevated creatinine elevation   Patient also subsequently underwent an MRI with contrast      1) LLUVIA on CKD - baseline creatinine 1 9 in 2016 and then 2 3 in 2017   Chronic disease possibly secondary to diabetes versus hypertension   Patient presented with hypertensive emergency, and patient did receive contrast initially with both MRI and CT contrast and was on ACE-inhibitor at home which could all be contributing to renal dysfunction   UA with glucose, trace blood, proteinuria      Etiology for acute decline in renal function is likely multifactorial from hypertensive urgency, progression of disease, versus a glomerular issue if there is no improvement given longstanding diabetes and microalbumin and creatinine ratio of greater than 9 g   I am not sure that the patient will have sufficient renal recovery but will monitor for now and plan for dialysis Friday if there is no improvement in renal dysfunction      - Cr slightly improved today  - I/O; avoid nephrotoxic agents  - renal ultrasound right kidney 12 6 cm, left kidney 12 4 cm, normal appearing kidneys   No hydro  - continue to hold IV fluids and diuretics for today    Patient appears to be in post ATN diuretic phase currently  - I am not certain that the biopsy would  at this point   Plus the blood pressures are uncontrolled   Therefore no urgent biopsy for now  - case discussed with ICU AP this AM     2) Change in vision - concern for mid brain lesion  Given MRI concerns for enhancing cerebral lesions, patient underwent LP to rule out viral issue or MS  There was one pontine lesion       - evaluated by Optho  - EEG without finding of seizure activity  - LP - completed 1/26  F/u results per Neurology  - any MRI studies should be without contrast  - the issues with contrast were reviewed from earlier in week   - concern for MS per discussion with Neurology this AM    - started on high dose steroids  - if pt is to remain on high dose steroids, will need PCP prophylaxis; would avoid bactrim given LLUVIA  Could use atovaquone  On vit D  On PPI     3) HTN urgency/emergency     - continue holding ACEi; hydralazine increased 1/25 and 1/26  - on carvedilol  - goal -170 today  - primary team Attending has changed to nifedipine this AM  - BP likely higher also due to steroids on board  - monitor with changes     4) proteinuria     -SPEP  -UPEP     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     - low Vit D - on weekly vit D     6) electrolytes-monitor potassium closely  - slowly rising  - on Low K diet  - recheck in AM    SUBJECTIVE / INTERVAL HISTORY:    Patient denies complaints  Urine output improving  I/O 445 cc /2 5 L  blood pressure is 983 to 178 systolic      OBJECTIVE:  Current Weight: Weight - Scale: 106 kg (233 lb 0 4 oz)  Vitals:    01/26/18 2256 01/27/18 0236 01/27/18 0700 01/27/18 0920   BP: (!) 178/97 (!) 173/81 (!) 201/93 169/90   BP Location: Left arm Left arm     Pulse: 85 83 97    Resp: 13 20 18    Temp: 98 3 °F (36 8 °C) 98 2 °F (36 8 °C) 98 5 °F (36 9 °C)    TempSrc: Oral Oral Oral    SpO2: 96% 95% 95%    Weight:       Height:           Intake/Output Summary (Last 24 hours) at 01/27/18 1101  Last data filed at 01/27/18 0927   Gross per 24 hour   Intake           822 83 ml   Output             3200 ml   Net         -2377 17 ml     General:  No acute distress  Skin:  No rash  Eyes:  Anicteric sclera  ENT:  Moist mucous membranes  Neck:  Supple  Chest:  CTA bilaterally  CVS:  S1, S2  Abdomen:  Soft, nontender  Extremities:  Trace lower extremity edema  :  No Hoffman  Neuro:  AAO x3  Psych:  Normal affect      Medications:    Current Facility-Administered Medications:     acetaminophen (TYLENOL) tablet 650 mg, 650 mg, Oral, Q4H PRN, Justina Keene PA-C    atorvastatin (LIPITOR) tablet 40 mg, 40 mg, Oral, QPM, Justina Keene PA-C, 40 mg at 01/26/18 1705    calcium acetate (PHOSLO) capsule 667 mg, 667 mg, Oral, TID With Meals, [facility], 667 mg at 01/27/18 0818    carvedilol (COREG) tablet 25 mg, 25 mg, Oral, BID With Meals, [facility], 25 mg at 01/27/18 0818    clopidogrel (PLAVIX) tablet 75 mg, 75 mg, Oral, Daily, Shama Tejada MD    diphenhydrAMINE (BENADRYL) tablet 25 mg, 25 mg, Oral, HS PRN, Shama Tejada MD    docusate sodium (COLACE) capsule 100 mg, 100 mg, Oral, BID, Justina Keene PA-C, 100 mg at 01/25/18 0904    ergocalciferol (VITAMIN D2) capsule 50,000 Units, 50,000 Units, Oral, Weekly, Erika Green PA-C, 50,000 Units at 01/26/18 0800    heparin (porcine) subcutaneous injection 5,000 Units, 5,000 Units, Subcutaneous, Q8H [facility], Shama Tejada MD    hydrALAZINE (APRESOLINE) injection 10 mg, 10 mg, Intravenous, Once, Shama Tejada MD    hydrALAZINE (APRESOLINE) injection 10 mg, 10 mg, Intravenous, Q6H PRN, Wilma Pennington PA-C, 10 mg at 01/26/18 1735    hydrALAZINE (APRESOLINE) tablet 100 mg, 100 mg, Oral, TID, Erika Green PA-C, 100 mg at 01/27/18 0818    insulin glargine (LANTUS) subcutaneous injection 15 Units, 15 Units, Subcutaneous, Daily With Breakfast, Denis Vigil MD, 15 Units at 01/27/18 0818    insulin glargine (LANTUS) subcutaneous injection 15 Units, 15 Units, Subcutaneous, HS, Sasha Bee MD, 15 Units at 01/26/18 2133    insulin lispro (HumaLOG) 100 units/mL subcutaneous injection 1-5 Units, 1-5 Units, Subcutaneous, HS, Sasha Bee MD, 1 Units at 01/26/18 2134    insulin lispro (HumaLOG) 100 units/mL subcutaneous injection 1-5 Units, 1-5 Units, Subcutaneous, 0200, Sasha Bee MD, 1 Units at 01/27/18 0251    insulin lispro (HumaLOG) 100 units/mL subcutaneous injection 1-6 Units, 1-6 Units, Subcutaneous, TID AC, 3 Units at 01/27/18 0818 **AND** Fingerstick Glucose (POCT), , , TID AC, Dony Beach MD    insulin lispro (HumaLOG) 100 units/mL subcutaneous injection 12 Units, 12 Units, Subcutaneous, TID With Meals, Sasha Bee MD, 12 Units at 01/27/18 0819    labetalol (NORMODYNE) injection 10 mg, 10 mg, Intravenous, Q4H PRN, Justina Keene PA-C, 10 mg at 01/26/18 1948    LORazepam (ATIVAN) tablet 0 5 mg, 0 5 mg, Oral, Q8H PRN, Dony Beach MD    methylPREDNISolone sodium succinate (Solu-MEDROL) 1,000 mg in sodium chloride 0 9 % 250 mL IVPB, 1,000 mg, Intravenous, Daily, [facility], [facility], Last Rate: 250 mL/hr at 01/27/18 0916, 1,000 mg at 01/27/18 0916    niCARdipine (CARDENE) 25 mg (STANDARD CONCENTRATION) in sodium chloride 0 9% 250 mL, 1-15 mg/hr, Intravenous, Titrated, Justina Keene PA-C, Stopped at 01/26/18 4396    NIFEdipine (PROCARDIA XL) 24 hr tablet 30 mg, 30 mg, Oral, Daily, Dony Beach MD, 30 mg at 01/27/18 0818    ondansetron (ZOFRAN) injection 4 mg, 4 mg, Intravenous, Q6H PRN, Justina Keene PA-C, 4 mg at 01/26/18 0636    pantoprazole (PROTONIX) EC tablet 20 mg, 20 mg, Oral, Early Morning, Dony Beach MD    promethazine (PHENERGAN) injection 25 mg, 25 mg, Intravenous, Q6H PRN, Dony Beach MD, 25 mg at 01/26/18 0916    Laboratory Results:    Results from last 7 days  Lab Units 01/27/18  0432 01/26/18  1418 01/26/18  0432 01/25/18  0445 01/24/18  1534 01/24/18  0508 01/23/18  0450  01/22/18  1015   WBC Thousand/uL 6 01  --  7 22  --   --  8 46 8 75  --  8 24   HEMOGLOBIN g/dL 8 6*  --  8 3*  --   --  8 9* 9 1*  --  10 5*   HEMATOCRIT % 25 4*  --  24 1*  --   --  26 0* 26 8*  --  30 8*   PLATELETS Thousands/uL 228  --  217  --   --  222 235  --  275   SODIUM mmol/L 137 136 137 135* 138 140 139  < > 136   POTASSIUM mmol/L 5 0 5 0 4 8 4 8 5 0 4 4 4 2  < > 5 3   CHLORIDE mmol/L 105 105 105 105 106 108 108  < > 105   CO2 mmol/L 21 19* 20* 20* 21 20* 21  < > 22   BUN mg/dL 60* 62* 59* 52* 47* 47* 43*  < > 35*   CREATININE mg/dL 7 38* 8 20* 8 33* 7 87* 7 36* 6 80* 4 57*  < > 3 74*   CALCIUM mg/dL 8 4 8 0* 8 1* 7 8* 7 7* 8 1* 8 3  < > 8 9   MAGNESIUM mg/dL  --   --   --  2 1  --   --  2 0  --  2 0   PHOSPHORUS mg/dL  --   --   --  6 9*  --  6 5*  --   --   --    TOTAL PROTEIN g/dL  --   --   --   --  5 1*  --  5 4*  --  6 2*   GLUCOSE RANDOM mg/dL 179* 325* 137 179* 109 72 86  < > 360*   < > = values in this interval not displayed

## 2018-01-27 NOTE — ASSESSMENT & PLAN NOTE
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

## 2018-01-27 NOTE — PROGRESS NOTES
Progress Note - Neurology   Lala Mckeon 27 y o  male MRN: [id number]  Unit/Bed#:  Encounter: [id number]        Assessment/Plan :  1  Constellation central neurologic symptoms including a right bilateral gaze palsy, punctate ischemic injury posterior pontine area confirmed on MRI, and MRI changes of small vessel spaces somewhat consistent with demyelinating disease all still present and stable  The patient's LP done to confirm or rule out the existence of demyelinating disease was initially unremarkable as his protein is 39 and the remainder of his counts are within normal limits with the exception of his glucose, which is consistent with his diabetes  His paraneoplastic panel negating herpes Lyme zoster etc are all negative  And his MRI repeat of the brain without contrast as well as C and T spines also without contrast do not indicate any lesions in his cervical spine and his T-spine was moving somewhat and was less than diagnostic study  They have not been done with contrast given his renal disease at this point  Nevertheless we have started him on a crispin Plavix rt 3 day course of steroids at this point  His gaze palsy remains unchanged, and he appears to be tolerating it well not requiring an eye patch and he does not have headaches at this point either  He has been restarted on both his Plavix and continued on his statin  2  Cerebral vascular disease- if autoimmune disease is ultimately ruled out based on his tap and review of his films as a whole his white matter changes which raise the concern for demyelinating disease will ultimately wind up most likely being characterized as atherosclerotic small vessel disease  This patient has a moderate burden certainly given his young age  He should continue on both a anti-platelet and statin  Additionally after he has been on his Plavix for several days he should have a P2Y12 assay checked to ascertain that he is a true responder    3  Type 1 diabetes mellitus- his sugars are going to be elevated over the next several days of because of his steroid infusion he can be covered accordingly  4  Uncontrolled hypertension and stage III/IV renal disease- of being managed by Nephrology  We discussed this morning that they are comfortable that administering steroids at this point as they feel that based on his clinical picture and the fact that he has not had follow-up in the intervening 2 years may indicate a benign picture of clinical to deterioration as opposed to an inflammatory process  Plan: At this point we will continue with this patient's steroids tomorrow  We can determine tomorrow whether not he would benefit from a total of 5 days or whether not he he may just be a candidate for 3  I suspect that he could be transferred out to the floor to a regular room to somewhat liberalize his activity  We will continue to follow him with you with the serial exams  Please call for any questions or concerns  I discussed with the patient and his mother who was at the bedside at that time whether not the patient feels that he may be somewhat overwhelmed and depressed with his healthcare situation at this point  We discussed the benefits of possibly adopting an antidepressant at this time  He will consider it    Subjective:   I still do not have a headache I think I am doing okay whether not I just feel better from the steroids that might be true  ROS: 12 system cued query was negative on all of the above  He does not endorse any other complaints at this point  The staff nurse reports no adverse events over the last 24 hours  Vitals: Blood pressure 169/90, pulse 97, temperature 98 5 °F (36 9 °C), temperature source Oral, resp  rate 18, height 5' 10" (1 778 m), weight 106 kg (233 lb 0 4 oz), SpO2 95 %  ,Body mass index is 32 5 kg/m²      MEDS:    atorvastatin 40 mg Oral QPM   calcium acetate 667 mg Oral TID With Meals   carvedilol 25 mg Oral BID With Meals   clopidogrel 75 mg Oral Daily   docusate sodium 100 mg Oral BID   ergocalciferol 50,000 Units Oral Weekly   heparin (porcine) 5,000 Units Subcutaneous Q8H [address]   hydrALAZINE 10 mg Intravenous Once   hydrALAZINE 100 mg Oral TID   insulin glargine 15 Units Subcutaneous Daily With Breakfast   insulin glargine 15 Units Subcutaneous HS   insulin lispro 1-5 Units Subcutaneous HS   insulin lispro 1-5 Units Subcutaneous 0200   insulin lispro 1-6 Units Subcutaneous TID AC   insulin lispro 12 Units Subcutaneous TID With Meals   methylPREDNISolone sodium succinate 1,000 mg Intravenous Daily   NIFEdipine 30 mg Oral Daily   pantoprazole 40 mg Oral Early Morning           Physical Exam:    Pat seen in:  In the bedside chair,  General appearance: alert,   Lungs, Heart, & abdomen WNL  Extremities: atraumatic, no cyanosis or edema    Neurologic:   Mental status:  Remains bright, no deficits  CN:  Cranial nerve exam remains stable and unchanged  He continues with a bilateral right gaze palsy  Motor and Sensory:  Exams are unchanged there are no deficits  Cerebellar:  No ataxia with maneuvers  Gait:  Was limited and cautious because of his vision there was no lateralizing weakness or drift     We were able to ambulate him around the unit in the gonzalez with a temporary monitor  Lab Results:   I have personally reviewed pertinent reports    , CBC:   Results from last 7 days  Lab Units 01/27/18  0432 01/26/18  0432 01/24/18  0508   WBC Thousand/uL 6 01 7 22 8 46   RBC Million/uL 2 82* 2 68* 2 88*   HEMOGLOBIN g/dL 8 6* 8 3* 8 9*   HEMATOCRIT % 25 4* 24 1* 26 0*   MCV fL 90 90 90   PLATELETS Thousands/uL 228 217 222   , BMP/CMP:   Results from last 7 days  Lab Units 01/27/18  0432 01/26/18  1418 01/26/18  0432   SODIUM mmol/L 137 136 137   POTASSIUM mmol/L 5 0 5 0 4 8   CHLORIDE mmol/L 105 105 105   CO2 mmol/L 21 19* 20*   ANION GAP mmol/L 11 12 12   BUN mg/dL 60* 62* 59*   CREATININE mg/dL 7 38* 8 20* 8 33*   GLUCOSE RANDOM mg/dL 179* 325* 137   CALCIUM mg/dL 8 4 8 0* 8 1*   AST U/L  --   --   --    ALT U/L  --   --   --    ALK PHOS U/L  --   --   --    TOTAL PROTEIN g/dL  --   --   --    BILIRUBIN TOTAL mg/dL  --   --   --    EGFR ml/min/1 73sq m 9 8 8   his blood sugars are somewhat elevated today likely because of his steroids  His creatinine is beginning to trend down  Expect his white count will also bump up tomorrow related to his steroids  His paraneoplastic panel is negative on his CSF  His MS panel is pending  Imaging Studies: No new neuro imaging  Counseling / Coordination of Care  Total time spent today 40 minutes  Greater than 50% of total time was spent with the patient and / or family counseling and / or coordination of care  A description of the counseling / coordination of care: All of the above was discussed again with the patient and his mother who was present  The diagnostic considerations and testing at each step can't have been discussed with them along the way  They have had appropriate concerning questions at each step of the way  Dictation voice to text software has been used in the creation of this document  Please consider this in light of any contextual or grammatical errors

## 2018-01-27 NOTE — PROGRESS NOTES
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   Risk factors include hypertensive urgency, DM1 and significant smoker half to 1/2 packs a day  · Punctate CVA in velia noted on MRI (see addendum from yesterday)  · Demyelination noted on MRI, LP performed, initial CSF studies are unremarkable, MS panel is pending  · Currently being treated with high dose solumdrol  · Outpatient follow up with opthalmology for retinopathy  · Titrate nicardipine drip to ensure gradual reduction in BP  · Patching eyes q2h  · Continue statin  · Restart antiplatelets  · PT/OT following        Acute renal failure superimposed on stage 2 chronic kidney disease (HCC)   Assessment & Plan    · Present upon admission, baseline 1 9-2 2  · Suspect ARF due to contrast induced nephropathy and hypertensive urgency with ACEI and NSAID use  · Bicarb infusion per Nephrology completed  · Significant proteinuria noted on Ua, SPEP UPEP pending  · Renal ultrasound is unremarkbale  · low phos, low K diet  · Urine output is adequate  · Creatinine is downtrending        Hypertensive urgency   Assessment & Plan    · Likely due to CVA  · Continue coreg and hydralazine  · Changed amlodipine to nifedipine  · BP improved this AM  · Continue to monitor        Type 1 diabetes mellitus with diabetic nephropathy, with long-term current use of insulin ([facility])   Assessment & Plan    · Glucose stabilized  · A1c is 6 4  · Continue basal bolus insulin          VTE Pharmacologic Prophylaxis:   Pharmacologic: Heparin  Mechanical VTE Prophylaxis in Place: Yes    Patient Centered Rounds: I have performed bedside rounds with nursing staff today  Education and Discussions with Family / Patient: family at bedside, updated plan of care    Time Spent for Care: 15 minutes  More than 50% of total time spent on counseling and coordination of care as described above  Current Length of Stay: 5 day(s)    Current Patient Status: Inpatient   Certification Statement: The patient will continue to require additional inpatient hospital stay due to IV solumedrol    Discharge Plan: TBD    Code Status: Level 1 - Full Code      Subjective:   Denies nausea, good appetite, positive for insomnia after receiving solumedrol    Objective:     Vitals:   Temp (24hrs), Av 4 °F (36 9 °C), Min:98 2 °F (36 8 °C), Max:98 8 °F (37 1 °C)    HR:  [79-97] 97  Resp:  [12-20] 18  BP: (169-201)/(81-97) 169/90  SpO2:  [95 %-97 %] 95 %  Body mass index is 32 5 kg/m²  Input and Output Summary (last 24 hours): Intake/Output Summary (Last 24 hours) at 18 1035  Last data filed at 18 7586   Gross per 24 hour   Intake           822 83 ml   Output             3200 ml   Net         -2377 17 ml       Physical Exam:     Physical Exam   Constitutional: He is oriented to person, place, and time  He appears well-developed and well-nourished  HENT:   Head: Normocephalic and atraumatic  Eyes: Pupils are equal, round, and reactive to light  Right eye exhibits no discharge  Left eye exhibits no discharge  No scleral icterus  Neck: Neck supple  No JVD present  Cardiovascular: Normal rate and regular rhythm  No murmur heard  Pulmonary/Chest: Effort normal and breath sounds normal    Abdominal: Soft  He exhibits no distension  Musculoskeletal: He exhibits edema  Neurological: He is alert and oriented to person, place, and time  A cranial nerve deficit is present  Left gaze palsy bilateral   Skin: Skin is warm and dry           Additional Data:     Labs:      Results from last 7 days  Lab Units 18  0396 01/26/18  0432   WBC Thousand/uL 6 01 7 22   HEMOGLOBIN g/dL 8 6* 8 3*   HEMATOCRIT % 25 4* 24 1*   PLATELETS Thousands/uL 228 217   NEUTROS PCT %  --  69   LYMPHS PCT %  --  18   LYMPHO PCT % 5*  --    MONOS PCT %  --  8   MONO PCT MAN % 1*  --    EOS PCT %  --  4   EOSINO PCT MANUAL % 0  --        Results from last 7 days  Lab Units 01/27/18  0432  01/24/18  1534   SODIUM mmol/L 137  < > 138   POTASSIUM mmol/L 5 0  < > 5 0   CHLORIDE mmol/L 105  < > 106   CO2 mmol/L 21  < > 21   BUN mg/dL 60*  < > 47*   CREATININE mg/dL 7 38*  < > 7 36*   CALCIUM mg/dL 8 4  < > 7 7*   TOTAL PROTEIN g/dL  --   --  5 1*   BILIRUBIN TOTAL mg/dL  --   --  0 20   ALK PHOS U/L  --   --  85   ALT U/L  --   --  15   AST U/L  --   --  16   GLUCOSE RANDOM mg/dL 179*  < > 109   < > = values in this interval not displayed  Results from last 7 days  Lab Units 01/25/18  1202   INR  0 95       * I Have Reviewed All Lab Data Listed Above  * Additional Pertinent Lab Tests Reviewed:  All Labs Within Last 24 Hours Reviewed      Recent Cultures (last 7 days):       Results from last 7 days  Lab Units 01/26/18  1037   GRAM STAIN RESULT  No No polys seen  No No bacteria seen       Last 24 Hours Medication List:     atorvastatin 40 mg Oral QPM   calcium acetate 667 mg Oral TID With Meals   carvedilol 25 mg Oral BID With Meals   clopidogrel 75 mg Oral Daily   docusate sodium 100 mg Oral BID   ergocalciferol 50,000 Units Oral Weekly   heparin (porcine) 5,000 Units Subcutaneous Q8H [address]   hydrALAZINE 10 mg Intravenous Once   hydrALAZINE 100 mg Oral TID   insulin glargine 15 Units Subcutaneous Daily With Breakfast   insulin glargine 15 Units Subcutaneous HS   insulin lispro 1-5 Units Subcutaneous HS   insulin lispro 1-5 Units Subcutaneous 0200   insulin lispro 1-6 Units Subcutaneous TID AC   insulin lispro 12 Units Subcutaneous TID With Meals   methylPREDNISolone sodium succinate 1,000 mg Intravenous Daily   NIFEdipine 30 mg Oral Daily   pantoprazole 20 mg Oral Early Morning        Today, Patient Was Seen By: Kezia Acevedo MD    ** Please Note: Dictation voice to text software may have been used in the creation of this document   **

## 2018-01-27 NOTE — ASSESSMENT & PLAN NOTE
· Present upon admission, baseline 1 9-2 2  · Suspect ARF due to contrast induced nephropathy and hypertensive urgency with ACEI and NSAID use  · Bicarb infusion per Nephrology completed  · Significant proteinuria noted on Ua, SPEP UPEP pending  · Renal ultrasound is unremarkbale  · low phos, low K diet  · Urine output is adequate  · Creatinine is downtrending

## 2018-01-28 PROBLEM — H51.0: Status: ACTIVE | Noted: 2018-01-28

## 2018-01-28 PROBLEM — H53.30 BINOCULAR VISUAL DISTURBANCE: Status: ACTIVE | Noted: 2018-01-28

## 2018-01-28 LAB
ALBUMIN SERPL ELPH-MCNC: 2.86 G/DL (ref 3.5–5)
ALBUMIN SERPL ELPH-MCNC: 57.1 % (ref 52–65)
ALPHA1 GLOB SERPL ELPH-MCNC: 0.29 G/DL (ref 0.1–0.4)
ALPHA1 GLOB SERPL ELPH-MCNC: 5.7 % (ref 2.5–5)
ALPHA2 GLOB SERPL ELPH-MCNC: 0.98 G/DL (ref 0.4–1.2)
ALPHA2 GLOB SERPL ELPH-MCNC: 19.6 % (ref 7–13)
ANION GAP SERPL CALCULATED.3IONS-SCNC: 13 MMOL/L (ref 4–13)
BASOPHILS # BLD AUTO: 0.01 THOUSANDS/ΜL (ref 0–0.1)
BASOPHILS NFR BLD AUTO: 0 % (ref 0–1)
BETA GLOB ABNORMAL SERPL ELPH-MCNC: 0.24 G/DL (ref 0.4–0.8)
BETA1 GLOB SERPL ELPH-MCNC: 4.8 % (ref 5–13)
BETA2 GLOB SERPL ELPH-MCNC: 4.9 % (ref 2–8)
BETA2+GAMMA GLOB SERPL ELPH-MCNC: 0.25 G/DL (ref 0.2–0.5)
BUN SERPL-MCNC: 70 MG/DL (ref 5–25)
CALCIUM SERPL-MCNC: 8.7 MG/DL (ref 8.3–10.1)
CHLORIDE SERPL-SCNC: 104 MMOL/L (ref 100–108)
CO2 SERPL-SCNC: 20 MMOL/L (ref 21–32)
CREAT SERPL-MCNC: 6.63 MG/DL (ref 0.6–1.3)
EOSINOPHIL # BLD AUTO: 0 THOUSAND/ΜL (ref 0–0.61)
EOSINOPHIL NFR BLD AUTO: 0 % (ref 0–6)
ERYTHROCYTE [DISTWIDTH] IN BLOOD BY AUTOMATED COUNT: 13 % (ref 11.6–15.1)
ERYTHROCYTE [DISTWIDTH] IN BLOOD BY AUTOMATED COUNT: 13.1 % (ref 11.6–15.1)
GAMMA GLOB ABNORMAL SERPL ELPH-MCNC: 0.4 G/DL (ref 0.5–1.6)
GAMMA GLOB SERPL ELPH-MCNC: 7.9 % (ref 12–22)
GFR SERPL CREATININE-BSD FRML MDRD: 10 ML/MIN/1.73SQ M
GLUCOSE SERPL-MCNC: 117 MG/DL (ref 65–140)
GLUCOSE SERPL-MCNC: 127 MG/DL (ref 65–140)
GLUCOSE SERPL-MCNC: 152 MG/DL (ref 65–140)
GLUCOSE SERPL-MCNC: 163 MG/DL (ref 65–140)
GLUCOSE SERPL-MCNC: 165 MG/DL (ref 65–140)
GLUCOSE SERPL-MCNC: 214 MG/DL (ref 65–140)
GLUCOSE SERPL-MCNC: 225 MG/DL (ref 65–140)
GLUCOSE SERPL-MCNC: 233 MG/DL (ref 65–140)
GLUCOSE SERPL-MCNC: 251 MG/DL (ref 65–140)
GLUCOSE SERPL-MCNC: 259 MG/DL (ref 65–140)
GLUCOSE SERPL-MCNC: 290 MG/DL (ref 65–140)
GLUCOSE SERPL-MCNC: 338 MG/DL (ref 65–140)
GLUCOSE SERPL-MCNC: 478 MG/DL (ref 65–140)
GLUCOSE SERPL-MCNC: 92 MG/DL (ref 65–140)
GLUCOSE SERPL-MCNC: >500 MG/DL (ref 65–140)
GLUCOSE SERPL-MCNC: >500 MG/DL (ref 65–140)
HCT VFR BLD AUTO: 24.6 % (ref 36.5–49.3)
HCT VFR BLD AUTO: 24.7 % (ref 36.5–49.3)
HGB BLD-MCNC: 8.5 G/DL (ref 12–17)
HGB BLD-MCNC: 8.5 G/DL (ref 12–17)
IGG/ALB SER: 1.33 {RATIO} (ref 1.1–1.8)
LYMPHOCYTES # BLD AUTO: 1.06 THOUSANDS/ΜL (ref 0.6–4.47)
LYMPHOCYTES NFR BLD AUTO: 6 % (ref 14–44)
MCH RBC QN AUTO: 30.6 PG (ref 26.8–34.3)
MCH RBC QN AUTO: 30.6 PG (ref 26.8–34.3)
MCHC RBC AUTO-ENTMCNC: 34.4 G/DL (ref 31.4–37.4)
MCHC RBC AUTO-ENTMCNC: 34.6 G/DL (ref 31.4–37.4)
MCV RBC AUTO: 89 FL (ref 82–98)
MCV RBC AUTO: 89 FL (ref 82–98)
MONOCYTES # BLD AUTO: 0.96 THOUSAND/ΜL (ref 0.17–1.22)
MONOCYTES NFR BLD AUTO: 6 % (ref 4–12)
NEUTROPHILS # BLD AUTO: 15.27 THOUSANDS/ΜL (ref 1.85–7.62)
NEUTS SEG NFR BLD AUTO: 88 % (ref 43–75)
PLATELET # BLD AUTO: 272 THOUSANDS/UL (ref 149–390)
PLATELET # BLD AUTO: 273 THOUSANDS/UL (ref 149–390)
PMV BLD AUTO: 11.4 FL (ref 8.9–12.7)
PMV BLD AUTO: 11.4 FL (ref 8.9–12.7)
POTASSIUM SERPL-SCNC: 4.3 MMOL/L (ref 3.5–5.3)
PROT SERPL-MCNC: 5 G/DL (ref 6.4–8.2)
RBC # BLD AUTO: 2.78 MILLION/UL (ref 3.88–5.62)
RBC # BLD AUTO: 2.78 MILLION/UL (ref 3.88–5.62)
SODIUM SERPL-SCNC: 137 MMOL/L (ref 136–145)
WBC # BLD AUTO: 16.63 THOUSAND/UL (ref 4.31–10.16)
WBC # BLD AUTO: 17.3 THOUSAND/UL (ref 4.31–10.16)

## 2018-01-28 PROCEDURE — 85027 COMPLETE CBC AUTOMATED: CPT | Performed by: INTERNAL MEDICINE

## 2018-01-28 PROCEDURE — 99291 CRITICAL CARE FIRST HOUR: CPT | Performed by: NURSE PRACTITIONER

## 2018-01-28 PROCEDURE — 99232 SBSQ HOSP IP/OBS MODERATE 35: CPT | Performed by: INTERNAL MEDICINE

## 2018-01-28 PROCEDURE — 82948 REAGENT STRIP/BLOOD GLUCOSE: CPT

## 2018-01-28 PROCEDURE — 80048 BASIC METABOLIC PNL TOTAL CA: CPT | Performed by: INTERNAL MEDICINE

## 2018-01-28 PROCEDURE — 85025 COMPLETE CBC W/AUTO DIFF WBC: CPT | Performed by: INTERNAL MEDICINE

## 2018-01-28 RX ORDER — ALBUMIN, HUMAN INJ 5% 5 %
175 SOLUTION INTRAVENOUS EVERY OTHER DAY
Status: DISCONTINUED | OUTPATIENT
Start: 2018-01-29 | End: 2018-02-04

## 2018-01-28 RX ORDER — INSULIN GLARGINE 100 [IU]/ML
25 INJECTION, SOLUTION SUBCUTANEOUS EVERY 12 HOURS SCHEDULED
Status: DISCONTINUED | OUTPATIENT
Start: 2018-01-28 | End: 2018-01-29

## 2018-01-28 RX ADMIN — HEPARIN SODIUM 5000 UNITS: 5000 INJECTION, SOLUTION INTRAVENOUS; SUBCUTANEOUS at 22:16

## 2018-01-28 RX ADMIN — HYDRALAZINE HYDROCHLORIDE 100 MG: 25 TABLET, FILM COATED ORAL at 16:07

## 2018-01-28 RX ADMIN — HEPARIN SODIUM 5000 UNITS: 5000 INJECTION, SOLUTION INTRAVENOUS; SUBCUTANEOUS at 06:19

## 2018-01-28 RX ADMIN — SODIUM CHLORIDE 0.5 UNITS/HR: 9 INJECTION, SOLUTION INTRAVENOUS at 08:19

## 2018-01-28 RX ADMIN — HYDRALAZINE HYDROCHLORIDE 100 MG: 25 TABLET, FILM COATED ORAL at 08:20

## 2018-01-28 RX ADMIN — HEPARIN SODIUM 5000 UNITS: 5000 INJECTION, SOLUTION INTRAVENOUS; SUBCUTANEOUS at 13:57

## 2018-01-28 RX ADMIN — CALCIUM ACETATE 667 MG: 667 CAPSULE ORAL at 11:51

## 2018-01-28 RX ADMIN — HYDRALAZINE HYDROCHLORIDE 100 MG: 25 TABLET, FILM COATED ORAL at 22:00

## 2018-01-28 RX ADMIN — ATORVASTATIN CALCIUM 40 MG: 40 TABLET, FILM COATED ORAL at 18:03

## 2018-01-28 RX ADMIN — CLOPIDOGREL BISULFATE 75 MG: 75 TABLET ORAL at 08:19

## 2018-01-28 RX ADMIN — CALCIUM ACETATE 667 MG: 667 CAPSULE ORAL at 16:06

## 2018-01-28 RX ADMIN — CARVEDILOL 25 MG: 12.5 TABLET, FILM COATED ORAL at 16:06

## 2018-01-28 RX ADMIN — CARVEDILOL 25 MG: 12.5 TABLET, FILM COATED ORAL at 08:19

## 2018-01-28 RX ADMIN — NIFEDIPINE 30 MG: 30 TABLET, FILM COATED, EXTENDED RELEASE ORAL at 08:20

## 2018-01-28 RX ADMIN — ONDANSETRON 4 MG: 2 INJECTION INTRAMUSCULAR; INTRAVENOUS at 04:39

## 2018-01-28 RX ADMIN — PANTOPRAZOLE SODIUM 40 MG: 40 TABLET, DELAYED RELEASE ORAL at 06:19

## 2018-01-28 RX ADMIN — INSULIN GLARGINE 20 UNITS: 100 INJECTION, SOLUTION SUBCUTANEOUS at 09:11

## 2018-01-28 RX ADMIN — INSULIN GLARGINE 25 UNITS: 100 INJECTION, SOLUTION SUBCUTANEOUS at 22:00

## 2018-01-28 RX ADMIN — SODIUM CHLORIDE 1000 MG: 0.9 INJECTION, SOLUTION INTRAVENOUS at 08:19

## 2018-01-28 RX ADMIN — CALCIUM ACETATE 667 MG: 667 CAPSULE ORAL at 08:19

## 2018-01-28 NOTE — PROGRESS NOTES
Progress Note - Martín Mckeon 27 y o  male MRN: [id number]    Unit/Bed#:  Encounter: [id number]      CC: diabetes f/u    Subjective: Myra Sacks is a 27y o  year old male with type 1 diabetes  Feels well  No complaints  No hypoglycemia  Was started on IV steroid by Neurology on [address] in severe hyperglycemia-he was switched over to IV insulin infusion last night  Objective:     Vitals: Blood pressure (!) 177/86, pulse 77, temperature 98 2 °F (36 8 °C), temperature source Oral, resp  rate (!) 25, height 5' 10" (1 778 m), weight 106 kg (233 lb 0 4 oz), SpO2 97 %  ,Body mass index is 32 5 kg/m²  Intake/Output Summary (Last 24 hours) at 01/28/18 1615  Last data filed at 01/28/18 1608   Gross per 24 hour   Intake           629 39 ml   Output             3545 ml   Net         -2915 61 ml       Physical Exam:  General Appearance: awake, appears stated age and cooperative  Head: Normocephalic, without obvious abnormality, atraumatic  Extremities: moves all extremities  Skin: Skin color and temperature normal    Pulm: no labored breathing    Lab, Imaging and other studies: I have personally reviewed pertinent reports  Results from last 7 days  Lab Units 01/28/18  0420  01/27/18  0432 01/26/18  1418   SODIUM mmol/L 137  --  137 136   POTASSIUM mmol/L 4 3  --  5 0 5 0   CHLORIDE mmol/L 104  --  105 105   CO2 mmol/L 20*  --  21 19*   BUN mg/dL 70*  --  60* 62*   CREATININE mg/dL 6 63*  --  7 38* 8 20*   GLUCOSE RANDOM mg/dL 165*  < > 179* 325*   CALCIUM mg/dL 8 7  --  8 4 8 0*   < > = values in this interval not displayed        POC Glucose (mg/dl)   Date Value   01/28/2018 214 (H)   01/28/2018 290 (H)   01/28/2018 233 (H)   01/28/2018 117   01/28/2018 92   01/28/2018 127   01/28/2018 163 (H)   01/28/2018 259 (H)   01/27/2018 338 (H)   01/27/2018 478 (H)       Assessment:  Type 1 diabetes with hyperglycemia  Chronic kidney disease  Acute kidney injury  Hypertension    Plan:  Sugars improved since starting IV insulin infusion-today may have been his last dose of steroids, although there is a possibility that it may be continued for another couple of days  Discussed with the nurse that after neurology follow-up it is determined that he will not need ongoing steroids then will switch to basal bolus insulin starting at bedtime tonight  Lantus 25 units twice daily and discontinue IV insulin infusion 1 hour after bedtime dose of Lantus is given  Start Humalog 15 units before meals  If on the other hand steroids are going to be continued then would suggest continuing IV insulin infusion as well  Acute kidney injury/chronic kidney disease-nephrology on board    Hypertension-being managed by Nephrology and primary team        Portions of the record may have been created with voice recognition software

## 2018-01-28 NOTE — PROGRESS NOTES
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    Assessment & Plan    In light of the fact that 3 days of high-dose steroids has not improved his been ocular gaze palsy we have discussed therapeutic plasma exchange with the patient at this point  After informed discussion at the bedside of the process procedure risks and benefits, including the fact that this will be 5 treatments every other day for the next 10 days total the patient was agreeable  We will proceed with insertion of a temporary Misha catheter tomorrow  Discussion with Nephrology concerning the institution of plasmapheresis, they have no problem in terms of a threat to his renal function  Binocular visual disturbance   Assessment & Plan    There has been no improvement with the high dose steroids however he has only had 3 days  We will continue an additional 2 infusions and start plasmapheresis as well  Cerebrovascular accident (CVA) of left pontine structure [facility])   Assessment & Plan    The diffusion positive imaging noted on his MRI is a punctate lateral posterior pontine infarct with the little correlation to his clinical symptoms at this time  Acute renal failure superimposed on stage 2 chronic kidney disease ([address] )   Assessment & Plan    Significantly decreased renal function although his creatinine and his urinary output are improving  Hypertensive urgency   Assessment & Plan    His blood pressure is currently being monitored and maintained without IV control agents          Type 1 diabetes mellitus with diabetic nephropathy, with long-term current use of insulin ([address] )   Assessment & Plan    His sugars have been markedly elevated as a result of his steroids  Given the fact that he is back on an insulin coverage regime we will continue with 2 more days of his steroids  History of lacunar cerebrovascular accident (CVA)   Assessment & Plan    The etiology of his previous stroke (2015) is unknown  He has not been on any anti-platelet agents or cholesterol reducing medications in the intervening 2-3 years  Subjective:   I am getting use to it  ROS: 12 system cued query was unchanged from previous days  He denies any headache  Continued visual disturbance  He is enjoying being somewhat more mobile  He acknowledges and reports his blood sugars are much more out a control but endorses that the coverage is a good idea  He continues to deny any other problems  He reports more urinary output  Vitals: Blood pressure (!) 177/86, pulse 77, temperature 98 2 °F (36 8 °C), temperature source Oral, resp  rate (!) 25, height 5' 10" (1 778 m), weight 106 kg (233 lb 0 4 oz), SpO2 97 %  ,Body mass index is 32 5 kg/m²  MEDS:    atorvastatin 40 mg Oral QPM   calcium acetate 667 mg Oral TID With Meals   carvedilol 25 mg Oral BID With Meals   clopidogrel 75 mg Oral Daily   docusate sodium 100 mg Oral BID   ergocalciferol 50,000 Units Oral Weekly   heparin (porcine) 5,000 Units Subcutaneous Q8H [facility]   hydrALAZINE 10 mg Intravenous Once   hydrALAZINE 100 mg Oral TID   insulin glargine 25 Units Subcutaneous Q12H [facility]   [START ON 1/29/2018] insulin lispro 15 Units Subcutaneous TID With Meals   [START ON 1/29/2018] insulin lispro 2-12 Units Subcutaneous TID With Meals   [START ON 1/29/2018] insulin lispro 2-12 Units Subcutaneous HS   NIFEdipine 30 mg Oral Daily   pantoprazole 40 mg Oral Early Morning    No p r n  meds    Physical Exam:    Rachel Rosita seen in:  Sitting out in the bedside chair watching TV  He appears comfortable and relaxed  His family is not present at this time    General appearance: alert, limited physical and neurologic exam   Extremities: atraumatic, no cyanosis or edema    Neurologic:   Mental status: Alert, complete oriented, spontaneous conversation and thought content appropriate  He is beginning to have a better understanding of his healthcare situation  Had much more pointed and appropriate questions today  He understood our conversation concerning the testing diagnostics and treatments to date  He had appropriate questions concerning the proposed plasma exchange  His emotional mood today seemed brighter and even today with no emotional or mood swings during our conversation of his status so far  CN:  Cranial nerve exam remains unchanged with a right gaze preference and continued inability to gaze left  Motor:  He is using his upper and lower extremities spontaneously without lateralizing weakness  Cerebellar: no ataxia noted with the gross and fine spontaneous motor tasks  Gait:  He was not gaited I did not observe his gait today  The staff nurse reports his gait is smooth and stable and he is able to manipulate his cables well and compensate for his left visual field deficit  Lab Results:   I have personally reviewed pertinent reports  , CBC:   Results from last 7 days  Lab Units 01/28/18 0420 01/27/18 0432 01/26/18 0432   WBC Thousand/uL 17 30*  16 63* 6 01 7 22   RBC Million/uL 2 78*  2 78* 2 82* 2 68*   HEMOGLOBIN g/dL 8 5*  8 5* 8 6* 8 3*   HEMATOCRIT % 24 7*  24 6* 25 4* 24 1*   MCV fL 89  89 90 90   PLATELETS Thousands/uL 272  273 228 217    His white count as a response to his steroids        BMP/CMP:   Results from last 7 days  Lab Units 01/28/18 0420 01/27/18 2007   SODIUM mmol/L 137  --    POTASSIUM mmol/L 4 3  --    CHLORIDE mmol/L 104  --    CO2 mmol/L 20*  --    ANION GAP mmol/L 13  --    BUN mg/dL 70*  --    CREATININE mg/dL 6 63*  --    GLUCOSE RANDOM mg/dL 165* 560*   CALCIUM mg/dL 8 7  --    AST U/L  --   --    ALT U/L  --   --    ALK PHOS U/L  --   --    TOTAL PROTEIN g/dL  --   --    BILIRUBIN TOTAL mg/dL  --   --    EGFR ml/min/1 73sq m 10  --    < > = values in this interval not displayed  , Vitamin B12:   , HgBA1C:   Results from last 7 days  Lab Units 01/23/18 0450   HEMOGLOBIN A1C % 6 4*   , TSH:   Results from last 7 days  Lab Units 01/23/18 0450   TSH 3RD GENERATON uIU/mL 2 208   , Coagulation:   Results from last 7 days  Lab Units 01/25/18  1202   INR  0 95   , Lipid Profile:   Results from last 7 days  Lab Units 01/23/18 0450   HDL mg/dL 38*   CHOLESTEROL mg/dL 215*   LDL CALC mg/dL 144*   TRIGLYCERIDES mg/dL 165*       Imaging Studies: No new neuro imaging  His films from [facility] have not been loaded although they have been received onto the PACS system is yet and tib be available for review  Counseling / Coordination of Care  Total time spent today 30 minutes  Greater than 50% of total time was spent with the patient and / or family counseling and / or coordination of care  A description of the counseling / coordination of care: The overwhelming majority of his exam today was spent in counseling and coordination of care  All of his concerns and questions were addressed at the bedside I believe he is understanding of the treatment plan and they reasons for it at this point  Dictation voice to text software has been used in the creation of this document  Please consider this in light of any contextual or grammatical errors

## 2018-01-28 NOTE — ASSESSMENT & PLAN NOTE
There has been no improvement with the high dose steroids however he has only had 3 days  We will continue an additional 2 infusions and start plasmapheresis as well

## 2018-01-28 NOTE — ASSESSMENT & PLAN NOTE
Significantly decreased renal function although his creatinine and his urinary output are improving

## 2018-01-28 NOTE — PROGRESS NOTES
NEPHROLOGY PROGRESS NOTE   Adilene Mckeon 27 y o  male MRN: [id number]  Unit/Bed#:  Encounter: [id number]  Reason for Consult: LLUVIA on CKD    ASSESSMENT and PLAN:    75-year-old male with a past medical history of CVA, type 1 diabetes, HTN, smoker, hyperlipidemia, CKD lost to follow-up who presents on 01/22 with change in vision   Patient was found to have hypertensive emergency and initially stroke alert was also called   CT revealed a suspected brainstem infarct and therefore the patient underwent a CT angiogram with contrast which did not show any acute hemorrhage or infarct   Patient was subsequently transferred to [facility] care in the ICU for initiation of Cardene drip   Nephrology is on board for elevated creatinine elevation   Patient also subsequently underwent an MRI with contrast      1) LLUVIA on CKD - baseline creatinine 1 9 in 2016 and then 2 3 in 2017   Chronic disease possibly secondary to diabetes versus hypertension   Patient presented with hypertensive emergency, and patient did receive contrast initially with both MRI and CT contrast and was on ACE-inhibitor at home which could all be contributing to renal dysfunction   UA with glucose, trace blood, proteinuria      Etiology for acute decline in renal function is likely multifactorial from hypertensive urgency, progression of disease, versus a glomerular issue if there is no improvement given longstanding diabetes and microalbumin and creatinine ratio of greater than 9 g   I am not sure that the patient will have sufficient renal recovery but will monitor for now and plan for dialysis Friday if there is no improvement in renal dysfunction      - pt appears to be in post ATN diuresis phase    - I/O; avoid nephrotoxic agents  - renal ultrasound right kidney 12 6 cm, left kidney 12 4 cm, normal appearing kidneys   No hydro  - continue to hold IV fluids and diuretics for today    - I am not certain that the biopsy would  at this point   Plus the blood pressures are uncontrolled   Therefore no urgent biopsy for now  - case discussed with ICU AP this AM     2) Change in vision - concern for mid brain lesion  Given MRI concerns for enhancing cerebral lesions, patient underwent LP to rule out viral issue or MS  There was one pontine lesion       - evaluated by Optho  - EEG without finding of seizure activity  - LP - completed 1/26  F/u results per Neurology  - any MRI studies should be without contrast  - the issues with contrast were reviewed from earlier in week   - concern for MS per discussion with Neurology this AM    - started on high dose steroids  - if pt is to remain on high dose steroids (currently on 3 day course of high dose steroids per Neurology note), will need PCP prophylaxis; would avoid bactrim given LLUVIA  Could use atovaquone  On vit D  On PPI     3) HTN urgency/emergency     - continue holding ACEi; hydralazine increased 1/25 and 1/26  - on carvedilol  - goal -170 today  - primary team Attending has changed to nifedipine 1/27  - BP likely higher also due to steroids on board  - monitor with changes - can titrate nifedipine if needed  BP overall improving     4) proteinuria     -SPEP - ordered  -UPEP     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     - low Vit D - on weekly vit D     6) electrolytes-monitor potassium closely      - K stable    7) leukocytosis - likely secondary to steroids  Monitor for infectious etiology    8) azotemia - likely due to LLUVIA, but rising today, likely due to steroids    9) DM    - on insulin gtt   Management per Primary and Endo team    SUBJECTIVE / INTERVAL HISTORY:  Pt denies complaints    OBJECTIVE:  Current Weight: Weight - Scale: 106 kg (233 lb 0 4 oz)  Vitals:    01/27/18 1900 01/27/18 2300 01/28/18 0300 01/28/18 0751   BP: (!) 198/94 (!) 186/89 (!) 178/86 (!) 174/87   BP Location: Left arm Right arm Right arm Right arm   Pulse: 94 93 80 80   Resp: (!) 23 14 21 20   Temp: 98 3 °F (36 8 °C) 98 6 °F (37 °C) 98 °F (36 7 °C) 98 2 °F (36 8 °C)   TempSrc: Oral Oral Oral Oral   SpO2: 96% 97% 97% 98%   Weight:       Height:           Intake/Output Summary (Last 24 hours) at 01/28/18 4946  Last data filed at 01/28/18 3380   Gross per 24 hour   Intake          1316 11 ml   Output             3650 ml   Net         -2333 89 ml     General:  No acute distress  Skin:  No rash  Eyes:  Anicteric sclera  ENT:  Moist mucous membranes  Neck:  Supple  Chest:  CTA bilaterally  CVS:  S1, S2  Abdomen:  Soft, nontender  Extremities:  Trace lower extremity edema  :  No Hoffman  Neuro:  AAO x3  Psych:  Normal affect    Medications:    Current Facility-Administered Medications:     acetaminophen (TYLENOL) tablet 650 mg, 650 mg, Oral, Q4H PRN, Justina Keene PA-C    atorvastatin (LIPITOR) tablet 40 mg, 40 mg, Oral, QPM, Justina Keene PA-C, 40 mg at 01/27/18 1700    calcium acetate (PHOSLO) capsule 667 mg, 667 mg, Oral, TID With Meals, Aydee Johnson PA-C, 667 mg at 01/28/18 0819    carvedilol (COREG) tablet 25 mg, 25 mg, Oral, BID With Meals, Aydee Johnson PA-C, 25 mg at 01/28/18 0819    clopidogrel (PLAVIX) tablet 75 mg, 75 mg, Oral, Daily, Sherren Pax, MD, 75 mg at 01/28/18 0819    diphenhydrAMINE (BENADRYL) tablet 25 mg, 25 mg, Oral, HS PRN, Sherren Pax, MD    docusate sodium (COLACE) capsule 100 mg, 100 mg, Oral, BID, Justina Keene PA-C, 100 mg at 01/25/18 0904    ergocalciferol (VITAMIN D2) capsule 50,000 Units, 50,000 Units, Oral, Weekly, Erika Green PA-C, 50,000 Units at 01/26/18 0800    heparin (porcine) subcutaneous injection 5,000 Units, 5,000 Units, Subcutaneous, Q8H [facility], Sherren Pax, MD, 5,000 Units at 01/28/18 4430    hydrALAZINE (APRESOLINE) injection 10 mg, 10 mg, Intravenous, Once, Sherren Pax, MD    hydrALAZINE (APRESOLINE) injection 10 mg, 10 mg, Intravenous, Q6H PRN, Perry Fam PA-C, 10 mg at 01/27/18 2027    hydrALAZINE (APRESOLINE) tablet 100 mg, 100 mg, Oral, TID, Erika SAV Green, 100 mg at 01/28/18 0820    insulin glargine (LANTUS) subcutaneous injection 20 Units, 20 Units, Subcutaneous, Daily With Breakfast, Stewart Garcia MD, 20 Units at 01/28/18 0911    insulin glargine (LANTUS) subcutaneous injection 20 Units, 20 Units, Subcutaneous, HS, Stewart Garcia MD, 20 Units at 01/27/18 2053    insulin regular (HumuLIN R,NovoLIN R) 1 Units/mL in sodium chloride 0 9 % 100 mL infusion, 0 3-21 Units/hr, Intravenous, Titrated, [facility], Last Rate: 0 5 mL/hr at 01/28/18 0819, 0 5 Units/hr at 01/28/18 0819    labetalol (NORMODYNE) injection 10 mg, 10 mg, Intravenous, Q4H PRN, Justina Keene PA-C, 10 mg at 01/27/18 2210    LORazepam (ATIVAN) tablet 0 5 mg, 0 5 mg, Oral, Q8H PRN, Yared Lara MD    methylPREDNISolone sodium succinate (Solu-MEDROL) 1,000 mg in sodium chloride 0 9 % 250 mL IVPB, 1,000 mg, Intravenous, Daily, [facility], Last Rate: 250 mL/hr at 01/28/18 0819, 1,000 mg at 01/28/18 0819    niCARdipine (CARDENE) 25 mg (STANDARD CONCENTRATION) in sodium chloride 0 9% 250 mL, 1-15 mg/hr, Intravenous, Titrated, Justina Keene PA-C, Stopped at 01/26/18 4062    NIFEdipine (PROCARDIA XL) 24 hr tablet 30 mg, 30 mg, Oral, Daily, Yared Lara MD, 30 mg at 01/28/18 0820    ondansetron (ZOFRAN) injection 4 mg, 4 mg, Intravenous, Q6H PRN, Justina Keene PA-C, 4 mg at 01/28/18 0439    pantoprazole (PROTONIX) EC tablet 40 mg, 40 mg, Oral, Early Morning, DEISI Lucio, 40 mg at 01/28/18 0619    promethazine (PHENERGAN) injection 25 mg, 25 mg, Intravenous, Q6H PRN, Yared Lara MD, 25 mg at 01/26/18 0916    Laboratory Results:    Results from last 7 days  Lab Units 01/28/18  0420 01/27/18 2007 01/27/18  0432 01/26/18  1418 01/26/18  0432 01/25/18  0445 01/24/18  1534 01/24/18  0508 01/23/18  0450  01/22/18  1015   WBC Thousand/uL 17 30*  16 63*  --  6 01  --  7 22  --   --  8 46 8 75  --  8 24   HEMOGLOBIN g/dL 8 5*  8 5*  --  8 6*  --  8 3*  --   -- 8  9* 9 1*  --  10 5*   HEMATOCRIT % 24 7*  24 6*  --  25 4*  --  24 1*  --   --  26 0* 26 8*  --  30 8*   PLATELETS Thousands/uL 272  273  --  228  --  217  --   --  222 235  --  275   SODIUM mmol/L 137  --  137 136 137 135* 138 140 139  < > 136   POTASSIUM mmol/L 4 3  --  5 0 5 0 4 8 4 8 5 0 4 4 4 2  < > 5 3   CHLORIDE mmol/L 104  --  105 105 105 105 106 108 108  < > 105   CO2 mmol/L 20*  --  21 19* 20* 20* 21 20* 21  < > 22   BUN mg/dL 70*  --  60* 62* 59* 52* 47* 47* 43*  < > 35*   CREATININE mg/dL 6 63*  --  7 38* 8 20* 8 33* 7 87* 7 36* 6 80* 4 57*  < > 3 74*   CALCIUM mg/dL 8 7  --  8 4 8 0* 8 1* 7 8* 7 7* 8 1* 8 3  < > 8 9   MAGNESIUM mg/dL  --   --   --   --   --  2 1  --   --  2 0  --  2 0   PHOSPHORUS mg/dL  --   --   --   --   --  6 9*  --  6 5*  --   --   --    TOTAL PROTEIN g/dL  --   --   --   --   --   --  5 1*  --  5 4*  --  6 2*   GLUCOSE RANDOM mg/dL 165* 560* 179* 325* 137 179* 109 72 86  < > 360*   < > = values in this interval not displayed

## 2018-01-28 NOTE — ASSESSMENT & PLAN NOTE
In light of the fact that 3 days of high-dose steroids has not improved his been ocular gaze palsy we have discussed therapeutic plasma exchange with the patient at this point  After informed discussion at the bedside of the process procedure risks and benefits, including the fact that this will be 5 treatments every other day for the next 10 days total the patient was agreeable  We will proceed with insertion of a temporary Misha catheter tomorrow  Discussion with Nephrology concerning the institution of plasmapheresis, they have no problem in terms of a threat to his renal function

## 2018-01-28 NOTE — ASSESSMENT & PLAN NOTE
The diffusion positive imaging noted on his MRI is a punctate lateral posterior pontine infarct with the little correlation to his clinical symptoms at this time

## 2018-01-28 NOTE — ASSESSMENT & PLAN NOTE
The etiology of his previous stroke (2015) is unknown  He has not been on any anti-platelet agents or cholesterol reducing medications in the intervening 2-3 years

## 2018-01-28 NOTE — ASSESSMENT & PLAN NOTE
His sugars have been markedly elevated as a result of his steroids  Given the fact that he is back on an insulin coverage regime we will continue with 2 more days of his steroids

## 2018-01-29 ENCOUNTER — APPOINTMENT (INPATIENT)
Dept: RADIOLOGY | Facility: HOSPITAL | Age: 33
DRG: 045 | End: 2018-01-29
Payer: COMMERCIAL

## 2018-01-29 PROBLEM — R79.89 AZOTEMIA: Status: ACTIVE | Noted: 2018-01-29

## 2018-01-29 PROBLEM — E83.39 HYPERPHOSPHATEMIA: Status: ACTIVE | Noted: 2018-01-29

## 2018-01-29 PROBLEM — E55.9 VITAMIN D DEFICIENCY: Status: ACTIVE | Noted: 2018-01-29

## 2018-01-29 LAB
ALBUMIN SERPL BCP-MCNC: 2.1 G/DL (ref 3.5–5)
ALBUMIN UR ELPH-MCNC: 72.9 %
ALPHA1 GLOB MFR UR ELPH: 2.3 %
ALPHA2 GLOB MFR UR ELPH: 6.3 %
ANION GAP SERPL CALCULATED.3IONS-SCNC: 11 MMOL/L (ref 4–13)
ANION GAP SERPL CALCULATED.3IONS-SCNC: 12 MMOL/L (ref 4–13)
APTT PPP: 30 SECONDS (ref 23–35)
B-GLOBULIN MFR UR ELPH: 7.3 %
BACTERIA CSF CULT: NO GROWTH
BUN SERPL-MCNC: 71 MG/DL (ref 5–25)
BUN SERPL-MCNC: 71 MG/DL (ref 5–25)
CA-I BLD-SCNC: 1.15 MMOL/L (ref 1.12–1.32)
CALCIUM SERPL-MCNC: 8.5 MG/DL (ref 8.3–10.1)
CALCIUM SERPL-MCNC: 8.6 MG/DL (ref 8.3–10.1)
CHLORIDE SERPL-SCNC: 106 MMOL/L (ref 100–108)
CHLORIDE SERPL-SCNC: 107 MMOL/L (ref 100–108)
CO2 SERPL-SCNC: 21 MMOL/L (ref 21–32)
CO2 SERPL-SCNC: 21 MMOL/L (ref 21–32)
COMMENT: NORMAL
CREAT SERPL-MCNC: 5.22 MG/DL (ref 0.6–1.3)
CREAT SERPL-MCNC: 5.24 MG/DL (ref 0.6–1.3)
ERYTHROCYTE [DISTWIDTH] IN BLOOD BY AUTOMATED COUNT: 13.3 % (ref 11.6–15.1)
F5 GENE MUT ANL BLD/T: NORMAL
FIBRINOGEN PPP-MCNC: 469 MG/DL (ref 227–495)
GAMMA GLOB MFR UR ELPH: 11.2 %
GFR SERPL CREATININE-BSD FRML MDRD: 13 ML/MIN/1.73SQ M
GFR SERPL CREATININE-BSD FRML MDRD: 13 ML/MIN/1.73SQ M
GLUCOSE SERPL-MCNC: 104 MG/DL (ref 65–140)
GLUCOSE SERPL-MCNC: 112 MG/DL (ref 65–140)
GLUCOSE SERPL-MCNC: 208 MG/DL (ref 65–140)
GLUCOSE SERPL-MCNC: 255 MG/DL (ref 65–140)
GLUCOSE SERPL-MCNC: 264 MG/DL (ref 65–140)
GLUCOSE SERPL-MCNC: 285 MG/DL (ref 65–140)
GLUCOSE SERPL-MCNC: 320 MG/DL (ref 65–140)
GLUCOSE SERPL-MCNC: 63 MG/DL (ref 65–140)
GLUCOSE SERPL-MCNC: 68 MG/DL (ref 65–140)
GLUCOSE SERPL-MCNC: 78 MG/DL (ref 65–140)
GLUCOSE SERPL-MCNC: 80 MG/DL (ref 65–140)
GLUCOSE SERPL-MCNC: 80 MG/DL (ref 65–140)
GLUCOSE SERPL-MCNC: 84 MG/DL (ref 65–140)
GLUCOSE SERPL-MCNC: 90 MG/DL (ref 65–140)
GLUCOSE SERPL-MCNC: 92 MG/DL (ref 65–140)
GLUCOSE SERPL-MCNC: 92 MG/DL (ref 65–140)
GLUCOSE SERPL-MCNC: 93 MG/DL (ref 65–140)
HCT VFR BLD AUTO: 26.5 % (ref 36.5–49.3)
HGB BLD-MCNC: 9.1 G/DL (ref 12–17)
HU1 AB TITR SER: NORMAL TITER
HU2 AB TITR SER IF: NORMAL TITER
INR PPP: 0.93 (ref 0.86–1.16)
MCH RBC QN AUTO: 30.7 PG (ref 26.8–34.3)
MCHC RBC AUTO-ENTMCNC: 34.3 G/DL (ref 31.4–37.4)
MCV RBC AUTO: 90 FL (ref 82–98)
PHOSPHATE SERPL-MCNC: 5.7 MG/DL (ref 2.7–4.5)
PLATELET # BLD AUTO: 298 THOUSANDS/UL (ref 149–390)
PMV BLD AUTO: 11.2 FL (ref 8.9–12.7)
POTASSIUM SERPL-SCNC: 4.2 MMOL/L (ref 3.5–5.3)
POTASSIUM SERPL-SCNC: 4.2 MMOL/L (ref 3.5–5.3)
PROT PATTERN UR ELPH-IMP: ABNORMAL
PROT UR-MCNC: 218 MG/DL
PROTHROMBIN TIME: 12.7 SECONDS (ref 12.1–14.4)
RBC # BLD AUTO: 2.96 MILLION/UL (ref 3.88–5.62)
SODIUM SERPL-SCNC: 139 MMOL/L (ref 136–145)
SODIUM SERPL-SCNC: 139 MMOL/L (ref 136–145)
WBC # BLD AUTO: 18.65 THOUSAND/UL (ref 4.31–10.16)

## 2018-01-29 PROCEDURE — 99232 SBSQ HOSP IP/OBS MODERATE 35: CPT | Performed by: INTERNAL MEDICINE

## 2018-01-29 PROCEDURE — 77001 FLUOROGUIDE FOR VEIN DEVICE: CPT

## 2018-01-29 PROCEDURE — 82948 REAGENT STRIP/BLOOD GLUCOSE: CPT

## 2018-01-29 PROCEDURE — C1752 CATH,HEMODIALYSIS,SHORT-TERM: HCPCS

## 2018-01-29 PROCEDURE — 85384 FIBRINOGEN ACTIVITY: CPT | Performed by: NURSE PRACTITIONER

## 2018-01-29 PROCEDURE — 76937 US GUIDE VASCULAR ACCESS: CPT

## 2018-01-29 PROCEDURE — 82330 ASSAY OF CALCIUM: CPT | Performed by: NURSE PRACTITIONER

## 2018-01-29 PROCEDURE — 02H633Z INSERTION OF INFUSION DEVICE INTO RIGHT ATRIUM, PERCUTANEOUS APPROACH: ICD-10-PCS | Performed by: INTERNAL MEDICINE

## 2018-01-29 PROCEDURE — 76937 US GUIDE VASCULAR ACCESS: CPT | Performed by: RADIOLOGY

## 2018-01-29 PROCEDURE — 80069 RENAL FUNCTION PANEL: CPT | Performed by: NURSE PRACTITIONER

## 2018-01-29 PROCEDURE — C1894 INTRO/SHEATH, NON-LASER: HCPCS

## 2018-01-29 PROCEDURE — 85730 THROMBOPLASTIN TIME PARTIAL: CPT | Performed by: NURSE PRACTITIONER

## 2018-01-29 PROCEDURE — 99233 SBSQ HOSP IP/OBS HIGH 50: CPT | Performed by: NURSE PRACTITIONER

## 2018-01-29 PROCEDURE — 80048 BASIC METABOLIC PNL TOTAL CA: CPT | Performed by: PHYSICIAN ASSISTANT

## 2018-01-29 PROCEDURE — 36556 INSERT NON-TUNNEL CV CATH: CPT | Performed by: RADIOLOGY

## 2018-01-29 PROCEDURE — 6A551Z3 PHERESIS OF PLASMA, MULTIPLE: ICD-10-PCS | Performed by: INTERNAL MEDICINE

## 2018-01-29 PROCEDURE — 85610 PROTHROMBIN TIME: CPT | Performed by: NURSE PRACTITIONER

## 2018-01-29 PROCEDURE — 85027 COMPLETE CBC AUTOMATED: CPT | Performed by: NURSE PRACTITIONER

## 2018-01-29 PROCEDURE — 36556 INSERT NON-TUNNEL CV CATH: CPT

## 2018-01-29 PROCEDURE — 77001 FLUOROGUIDE FOR VEIN DEVICE: CPT | Performed by: RADIOLOGY

## 2018-01-29 RX ORDER — NIFEDIPINE 30 MG/1
30 TABLET, EXTENDED RELEASE ORAL ONCE
Status: COMPLETED | OUTPATIENT
Start: 2018-01-29 | End: 2018-01-29

## 2018-01-29 RX ORDER — NIFEDIPINE 30 MG/1
60 TABLET, EXTENDED RELEASE ORAL DAILY
Status: DISCONTINUED | OUTPATIENT
Start: 2018-01-30 | End: 2018-01-29

## 2018-01-29 RX ORDER — HYDRALAZINE HYDROCHLORIDE 25 MG/1
100 TABLET, FILM COATED ORAL 3 TIMES DAILY
Status: DISCONTINUED | OUTPATIENT
Start: 2018-01-29 | End: 2018-01-30

## 2018-01-29 RX ORDER — PANTOPRAZOLE SODIUM 40 MG/1
40 TABLET, DELAYED RELEASE ORAL
Status: DISCONTINUED | OUTPATIENT
Start: 2018-01-30 | End: 2018-02-05 | Stop reason: HOSPADM

## 2018-01-29 RX ORDER — NIFEDIPINE 30 MG/1
60 TABLET, EXTENDED RELEASE ORAL DAILY
Status: DISCONTINUED | OUTPATIENT
Start: 2018-01-30 | End: 2018-01-30

## 2018-01-29 RX ORDER — CARVEDILOL 12.5 MG/1
25 TABLET ORAL 2 TIMES DAILY WITH MEALS
Status: DISCONTINUED | OUTPATIENT
Start: 2018-01-29 | End: 2018-01-30

## 2018-01-29 RX ADMIN — ATORVASTATIN CALCIUM 40 MG: 40 TABLET, FILM COATED ORAL at 17:13

## 2018-01-29 RX ADMIN — Medication 1 UNITS/HR: at 02:07

## 2018-01-29 RX ADMIN — DOCUSATE SODIUM 100 MG: 100 CAPSULE, LIQUID FILLED ORAL at 17:13

## 2018-01-29 RX ADMIN — CALCIUM ACETATE 667 MG: 667 CAPSULE ORAL at 15:55

## 2018-01-29 RX ADMIN — HYDRALAZINE HYDROCHLORIDE 100 MG: 25 TABLET, FILM COATED ORAL at 15:54

## 2018-01-29 RX ADMIN — LABETALOL HYDROCHLORIDE 10 MG: 5 INJECTION, SOLUTION INTRAVENOUS at 00:08

## 2018-01-29 RX ADMIN — NIFEDIPINE 30 MG: 30 TABLET, FILM COATED, EXTENDED RELEASE ORAL at 08:09

## 2018-01-29 RX ADMIN — HEPARIN SODIUM 5000 UNITS: 5000 INJECTION, SOLUTION INTRAVENOUS; SUBCUTANEOUS at 06:25

## 2018-01-29 RX ADMIN — CARVEDILOL 25 MG: 12.5 TABLET, FILM COATED ORAL at 15:55

## 2018-01-29 RX ADMIN — ONDANSETRON 4 MG: 2 INJECTION INTRAMUSCULAR; INTRAVENOUS at 04:21

## 2018-01-29 RX ADMIN — PANTOPRAZOLE SODIUM 40 MG: 40 TABLET, DELAYED RELEASE ORAL at 06:25

## 2018-01-29 RX ADMIN — CARVEDILOL 25 MG: 12.5 TABLET, FILM COATED ORAL at 07:52

## 2018-01-29 RX ADMIN — LABETALOL HYDROCHLORIDE 10 MG: 5 INJECTION, SOLUTION INTRAVENOUS at 04:07

## 2018-01-29 RX ADMIN — ALBUMIN HUMAN 175 G: 0.05 INJECTION, SOLUTION INTRAVENOUS at 22:02

## 2018-01-29 RX ADMIN — PROMETHAZINE HYDROCHLORIDE 25 MG: 25 INJECTION INTRAMUSCULAR; INTRAVENOUS at 06:44

## 2018-01-29 RX ADMIN — NIFEDIPINE 30 MG: 30 TABLET, FILM COATED, EXTENDED RELEASE ORAL at 17:13

## 2018-01-29 RX ADMIN — HYDRALAZINE HYDROCHLORIDE 10 MG: 20 INJECTION INTRAMUSCULAR; INTRAVENOUS at 02:08

## 2018-01-29 RX ADMIN — SODIUM CHLORIDE 1000 MG: 0.9 INJECTION, SOLUTION INTRAVENOUS at 12:43

## 2018-01-29 RX ADMIN — HEPARIN SODIUM 5000 UNITS: 5000 INJECTION, SOLUTION INTRAVENOUS; SUBCUTANEOUS at 22:47

## 2018-01-29 RX ADMIN — HYDRALAZINE HYDROCHLORIDE 100 MG: 25 TABLET, FILM COATED ORAL at 08:04

## 2018-01-29 NOTE — PROGRESS NOTES
NEPHROLOGY PROGRESS NOTE   Norma Mckeon 27 y o  male MRN: [id number]  Unit/Bed#:  Encounter: [id number]  Reason for Consult:  Acute kidney injury on chronic kidney disease    ASSESSMENT and PLAN:    The patient is a 17-year-old male with a history of type 1 diabetes mellitus, hypertension, CVA, smoker, hyperlipidemia and chronic kidney disease who was lost to follow-up  He presented on 01/22/2018 with a change in vision  Patient was found to have hypertensive urgency additionally MRI showed punctate left lateral pontine infarct along with concerns for demyelinating disorder  Creatinine peaked at 8 33  Nephrology following for management of acute on chronic CKD and hypertension   -plasma exchange initiated on 01/29/2018    1  Acute kidney injury:  Creatinine peaked at 8 33 currently down to 5 22   · LLUVIA due to ATN due to uncontrolled hypertension, contrast exposure in the setting of altered renal hemodynamics due to ACE-inhibitor, NSAIDs also contribute  · Renal function slowly improving  · Appears to be in the diuretic phase of ATN  · Renal ultrasound right kidney 12 6 cm, left kidney 12 4 cm, normal appearing kidneys with no hydronephrosis noted  · Significant proteinuria >9g  Consideration for biopsy but will likely not change the course of management at this point and certainly would be contraindicated due to uncontrolled hypertension  2  Chronic kidney disease:  Baseline creatinine unknown  Last creatinine 2 3 in 2017  CKD with significant proteinuria may be secondary to diabetic nephropathy  Certainly hypertensive nephrosclerosis also in the differential   3  Proteinuria:  SPEP, UPEP pending   4  Hypertensive emergency:  · On Coreg 25 mg twice a day, hydralazine 100 mg 3 times a day and nifedipine 30 mg daily  · Continue p r n  hydralazine and labetalol  · Increase nifedipine to 60 mg daily  5  Vision disturbance:  CNS demyelination suspected vs chronic ischemic disease    Patient on high-dose steroids; for plasma exchange starting today  Neurology following  · For plasmapheresis every other day for 10 days  · Status post lumbar puncture  · For steroid prophylaxis avoid Bactrim due to LLUVIA; atovaquone preferred  6  CVA: Acute left pontine  Neurology following  7  Hyperphosphatemia:  On PhosLo, renal diet  8  Diabetes mellitus:  Per primary team    SUMMARY OF RECOMMENDATIONS:  · Continue to closely monitor renal function  · Increase nifedipine to 60 mg daily  · Avoid hypotension  · Continue p r n  Medications  · Spoke with Neurology regarding plan of care  SUBJECTIVE / INTERVAL HISTORY:  No complaints  Denies chest pain, shortness of Breath  Mother in attendance  Spoke to both patient and mother concerning renal function  OBJECTIVE:  Current Weight: Weight - Scale: 104 kg (229 lb 8 oz)  Vitals:    01/29/18 0010 01/29/18 0405 01/29/18 0600 01/29/18 0700   BP: (!) 200/91 (!) 197/95  (!) 187/91   BP Location:       Pulse: 79 78  68   Resp: 22 18  17   Temp: 98 5 °F (36 9 °C) 98 6 °F (37 °C)     TempSrc: Oral Oral     SpO2: 99% 99%     Weight:   104 kg (229 lb 8 oz)    Height:           Intake/Output Summary (Last 24 hours) at 01/29/18 0804  Last data filed at 01/29/18 0405   Gross per 24 hour   Intake           958 48 ml   Output             2195 ml   Net         -1236 52 ml     General:  No acute distress  Quietly lying in bed  Skin:  Warm and dry, no rash  Eyes:  Sclera clear, rightward gaze noted  ENT:  Oropharynx moist  Neck:  Supple, no JVD  Chest:  Clear bilaterally  No wheezes, rhonchi, rales  CVS:  Regular rhythm, no murmur, rub, gallop appreciated  Abdomen:  Soft, nondistended, nontender, bowel sounds present  Extremities:  Trace edema  :  No Hoffman  Neuro:  Oriented to surroundings    Left visual field defect  Psych:  Appropriate for circumstances    Medications:    Current Facility-Administered Medications:     acetaminophen (TYLENOL) tablet 650 mg, 650 mg, Oral, Q4H PRN, Justina Keene PA-C    albumin human (FLEXBUMIN) 5 % injection 175 g, 175 g, Intravenous, Every Other Day, DEISI Lucio    atorvastatin (LIPITOR) tablet 40 mg, 40 mg, Oral, QPM, Justina Keene PA-C, 40 mg at 01/28/18 1803    calcium acetate (PHOSLO) capsule 667 mg, 667 mg, Oral, TID With Meals, [facility], 667 mg at 01/28/18 1606    carvedilol (COREG) tablet 25 mg, 25 mg, Oral, BID With Meals, [facility], 25 mg at 01/29/18 4396    clopidogrel (PLAVIX) tablet 75 mg, 75 mg, Oral, Daily, Vania Rascon MD, Stopped at 01/29/18 0804    diphenhydrAMINE (BENADRYL) tablet 25 mg, 25 mg, Oral, HS PRN, Vania Rascon MD    docusate sodium (COLACE) capsule 100 mg, 100 mg, Oral, BID, Justina Keene PA-C, 100 mg at 01/25/18 0904    ergocalciferol (VITAMIN D2) capsule 50,000 Units, 50,000 Units, Oral, Weekly, Erika Green PA-C, 50,000 Units at 01/26/18 0800    heparin (porcine) subcutaneous injection 5,000 Units, 5,000 Units, Subcutaneous, Q8H [facility], Vania Rascon MD, 5,000 Units at 01/29/18 7435    hydrALAZINE (APRESOLINE) injection 10 mg, 10 mg, Intravenous, Once, Vania Rascon MD    hydrALAZINE (APRESOLINE) injection 10 mg, 10 mg, Intravenous, Q6H PRN, [facility], 10 mg at 01/29/18 0208    hydrALAZINE (APRESOLINE) tablet 100 mg, 100 mg, Oral, TID, Erika Green PA-C, 100 mg at 01/28/18 2200    insulin glargine (LANTUS) subcutaneous injection 25 Units, 25 Units, Subcutaneous, Q12H [facility], Johnny Copeland MD, 25 Units at 01/28/18 2200    insulin lispro (HumaLOG) 100 units/mL subcutaneous injection 15 Units, 15 Units, Subcutaneous, TID With Meals, Johnny Copeland MD    insulin lispro (HumaLOG) 100 units/mL subcutaneous injection 2-12 Units, 2-12 Units, Subcutaneous, TID With Meals **AND** Fingerstick Glucose (POCT), , , TID AC, Johnny Copeland MD    insulin lispro (HumaLOG) 100 units/mL subcutaneous injection 2-12 Units, 2-12 Units, Subcutaneous, HS, Johnny Copeland MD    insulin regular (HumuLIN R,NovoLIN R) 1 Units/mL in sodium chloride 0 9 % 100 mL infusion, 0 3-21 Units/hr, Intravenous, Titrated, Wilma Pennington PA-C, Last Rate: 0 3 mL/hr at 01/29/18 0800, 0 3 Units/hr at 01/29/18 0800    labetalol (NORMODYNE) injection 10 mg, 10 mg, Intravenous, Q4H PRN, Justina Keene PA-C, 10 mg at 01/29/18 0407    LORazepam (ATIVAN) tablet 0 5 mg, 0 5 mg, Oral, Q8H PRN, Michell Chou MD    niCARdipine (CARDENE) 25 mg (STANDARD CONCENTRATION) in sodium chloride 0 9% 250 mL, 1-15 mg/hr, Intravenous, Titrated, David Ibanez PA-C, Stopped at 01/26/18 5113    NIFEdipine (PROCARDIA XL) 24 hr tablet 30 mg, 30 mg, Oral, Daily, Michell Chou MD, 30 mg at 01/28/18 0820    ondansetron (ZOFRAN) injection 4 mg, 4 mg, Intravenous, Q6H PRN, Justina Keene PA-C, 4 mg at 01/29/18 0421    pantoprazole (PROTONIX) EC tablet 40 mg, 40 mg, Oral, Early Morning, DEISI Lucio, 40 mg at 01/29/18 7467    promethazine (PHENERGAN) injection 25 mg, 25 mg, Intravenous, Q6H PRN, Michell Chou MD, 25 mg at 01/29/18 0644    Laboratory Results:    Results from last 7 days  Lab Units 01/29/18  0633 01/29/18  0625 01/28/18  0420 01/27/18 2007 01/27/18  0432 01/26/18  1418 01/26/18  0432 01/25/18  0445 01/24/18  1534 01/24/18  0508 01/23/18  0450  01/22/18  1015   WBC Thousand/uL  --  18 65* 17 30*  16 63*  --  6 01  --  7 22  --   --  8 46 8 75  --  8 24   HEMOGLOBIN g/dL  --  9 1* 8 5*  8 5*  --  8 6*  --  8 3*  --   --  8 9* 9 1*  --  10 5*   HEMATOCRIT %  --  26 5* 24 7*  24 6*  --  25 4*  --  24 1*  --   --  26 0* 26 8*  --  30 8*   PLATELETS Thousands/uL  --  298 272  273  --  228  --  217  --   --  222 235  --  275   SODIUM mmol/L 139 139 137  --  137 136 137 135* 138 140 139  < > 136   POTASSIUM mmol/L 4 2 4 2 4 3  --  5 0 5 0 4 8 4 8 5 0 4 4 4 2  < > 5 3   CHLORIDE mmol/L 106 107 104  --  105 105 105 105 106 108 108  < > 105   CO2 mmol/L 21 21 20*  --  21 19* 20* 20* 21 20* 21  < > 22   BUN mg/dL 71* 71* 70*  -- 60* 62* 59* 52* 47* 47* 43*  < > 35*   CREATININE mg/dL 5 22* 5 24* 6 63*  --  7 38* 8 20* 8 33* 7 87* 7 36* 6 80* 4 57*  < > 3 74*   CALCIUM mg/dL 8 6 8 5 8 7  --  8 4 8 0* 8 1* 7 8* 7 7* 8 1* 8 3  < > 8 9   MAGNESIUM mg/dL  --   --   --   --   --   --   --  2 1  --   --  2 0  --  2 0   PHOSPHORUS mg/dL  --  5 7*  --   --   --   --   --  6 9*  --  6 5*  --   --   --    TOTAL PROTEIN g/dL  --   --   --   --   --   --   --   --  5 1* 5 0* 5 4*  --  6 2*   GLUCOSE RANDOM mg/dL 80 80 165* 560* 179* 325* 137 179* 109 72 86  < > 360*   < > = values in this interval not displayed    Previous work up:

## 2018-01-29 NOTE — ASSESSMENT & PLAN NOTE
3 days of high-dose steroids have not improved binocular gaze palsy  I discussed therapeutic plasma exchange with the patient and his parents at the bedside as a treatment for a suspected autoimmune disease disorder as yet undetermined  We discussed his diagnostic workup so far and that a unifying diagnosis has alluded us to this point  We discussed with them that we feel that they benefit of starting plasmapheresis given his visual disturbance and his MRI outweighs the risks  His parents participated fully in the discussion at the bedside  They are all agreeable  We will proceed with insertion of a temporary Misha catheter this afternoon in the radiology IR  I have had a discussion with the plasmapheresis nurse, his for recess will likely start late today  Discussion with Nephrology again concerning the institution of plasmapheresis, they they agree that the benefits outweigh the risks

## 2018-01-29 NOTE — PROGRESS NOTES
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    Assessment & Plan    S/p 3 days of high dose solumedrol  No improvement in left gaze palsy  Will continue with two more days of IV solumdrol and plasma exchange will be initialted by neurology 5 treatments every other day  tristan catheter placed today        Cerebrovascular accident (CVA) of left pontine structure ([address] )   Assessment & Plan    · Left lateral conjugate gaze palsey with blurred vision, left facial droop, slurred speech and ataxic gait  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   Risk factors include hypertensive urgency, DM1 and significant smoker half to 1/2 packs a day  · Punctate CVA in velia noted on MRI (see addendum from yesterday)  · Demyelination noted on MRI, LP performed, initial CSF studies are unremarkable, MS panel is pending  · Currently being treated with high dose solumdrol  · Outpatient follow up with opthalmology for retinopathy  · Titrate nicardipine drip to ensure gradual reduction in BP  · Patching eyes q2h  · Continue statin  · continue antiplatelets  · PT/OT following        Acute renal failure superimposed on stage 3 chronic kidney disease (HCC)   Assessment & Plan    · Present upon admission, baseline 1 9-2 2  · Suspect ARF due to contrast induced nephropathy and hypertensive urgency with ACEI and NSAID use  · Bicarb infusion per Nephrology completed  · Significant proteinuria noted on Ua, SPEP UPEP pending  · Renal ultrasound is unremarkbale  · low phos, low K diet  · Urine output is adequate  · Creatinine is downtrending  · Continue to monitor BMP daily        Hypertensive urgency   Assessment & Plan    · Likely due to CVA  · Continue coreg and hydralazine  · Nifedipine increased  · Still elevated Bps, likely due to steroids  · Continue to monitor        Type 1 diabetes mellitus with diabetic nephropathy, with long-term current use of insulin (HCC)   Assessment & Plan    · Worsening glucose due to steroids  · Started on insulin drip maintain while on high dose IV steroids            VTE Pharmacologic Prophylaxis:   Pharmacologic: Heparin  Mechanical VTE Prophylaxis in Place: Yes    Patient Centered Rounds: I have performed bedside rounds with nursing staff today  Discussions with Specialists or Other Care Team Provider: neurology    Education and Discussions with Family / Patient: patient and mother, at bedside, plan of care    Time Spent for Care: 15 minutes  More than 50% of total time spent on counseling and coordination of care as described above  Current Length of Stay: 7 day(s)    Current Patient Status: Inpatient   Certification Statement: The patient will continue to require additional inpatient hospital stay due to [place]UV[place], plasma exchange    Discharge Plan: TBD    Code Status: Level 1 - Full Code      Subjective:   No change in vision, still has some edema  Denies SOB, abdominal pain or nausea    Objective:     Vitals:   Temp (24hrs), Av 5 °F (36 9 °C), Min:98 4 °F (36 9 °C), Max:98 6 °F (37 °C)    HR:  [68-79] 71  Resp:  [13-23] 13  BP: (155-200)/() 177/97  SpO2:  [99 %] 99 %  Body mass index is 32 01 kg/m²  Input and Output Summary (last 24 hours): Intake/Output Summary (Last 24 hours) at 18 1731  Last data filed at 18 1600   Gross per 24 hour   Intake          1285 91 ml   Output             3665 ml   Net         -2379 09 ml       Physical Exam:     Physical Exam   Constitutional: He is oriented to person, place, and time  He appears well-developed and well-nourished  HENT:   Head: Normocephalic and atraumatic  Mouth/Throat: No oropharyngeal exudate  Eyes: Pupils are equal, round, and reactive to light  No scleral icterus  Left gaze palsy   Neck: Neck supple  No JVD present  Cardiovascular: Normal rate and regular rhythm  Pulmonary/Chest: Effort normal and breath sounds normal    Abdominal: Soft  Bowel sounds are normal  There is no tenderness  Musculoskeletal: He exhibits edema  He exhibits no tenderness  Neurological: He is alert and oriented to person, place, and time  Skin: Skin is warm and dry  No rash noted  No erythema  Psychiatric: He has a normal mood and affect  His behavior is normal          Additional Data:     Labs:      Results from last 7 days  Lab Units 01/29/18  0625 01/28/18  0420   WBC Thousand/uL 18 65* 17 30*  16 63*   HEMOGLOBIN g/dL 9 1* 8 5*  8 5*   HEMATOCRIT % 26 5* 24 7*  24 6*   PLATELETS Thousands/uL 298 272  273   NEUTROS PCT %  --  88*   LYMPHS PCT %  --  6*   MONOS PCT %  --  6   EOS PCT %  --  0       Results from last 7 days  Lab Units 01/29/18  0633  01/24/18  1534   SODIUM mmol/L 139  < > 138   POTASSIUM mmol/L 4 2  < > 5 0   CHLORIDE mmol/L 106  < > 106   CO2 mmol/L 21  < > 21   BUN mg/dL 71*  < > 47*   CREATININE mg/dL 5 22*  < > 7 36*   CALCIUM mg/dL 8 6  < > 7 7*   TOTAL PROTEIN g/dL  --   --  5 1*   BILIRUBIN TOTAL mg/dL  --   --  0 20   ALK PHOS U/L  --   --  85   ALT U/L  --   --  15   AST U/L  --   --  16   GLUCOSE RANDOM mg/dL 80  < > 109   < > = values in this interval not displayed  Results from last 7 days  Lab Units 01/29/18  0625   INR  0 93       * I Have Reviewed All Lab Data Listed Above  * Additional Pertinent Lab Tests Reviewed:  All Labs Within Last 24 Hours Reviewed      Recent Cultures (last 7 days):       Results from last 7 days  Lab Units 01/26/18  1037   GRAM STAIN RESULT  No No polys seen  No No bacteria seen       Last 24 Hours Medication List:     albumin human 175 g Intravenous Every Other Day   atorvastatin 40 mg Oral QPM   calcium acetate 667 mg Oral TID With Meals   carvedilol 25 mg Oral BID With Meals   clopidogrel 75 mg Oral Daily   docusate sodium 100 mg Oral BID   ergocalciferol 50,000 Units Oral Weekly   heparin (porcine) 5,000 Units Subcutaneous Q8H [address]   hydrALAZINE 10 mg Intravenous Once   hydrALAZINE 100 mg Oral TID   methylPREDNISolone sodium succinate 1,000 mg Intravenous Daily   [START ON 1/30/2018] NIFEdipine 60 mg Oral Daily   [START ON 1/30/2018] pantoprazole 40 mg Oral Early Morning        Today, Patient Was Seen By: Anyi Arellano MD    ** Please Note: Dictation voice to text software may have been used in the creation of this document   **

## 2018-01-29 NOTE — ASSESSMENT & PLAN NOTE
· Likely due to CVA  · Continue coreg and hydralazine  · Nifedipine increased  · Still elevated Bps, likely due to steroids  · Continue to monitor

## 2018-01-29 NOTE — PROGRESS NOTES
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    Assessment & Plan    3 days of high-dose steroids have not improved binocular gaze palsy  I discussed therapeutic plasma exchange with the patient and his parents at the bedside as a treatment for a suspected autoimmune disease disorder as yet undetermined  We discussed his diagnostic workup so far and that a unifying diagnosis has alluded us to this point  We discussed with them that we feel that they benefit of starting plasmapheresis given his visual disturbance and his MRI outweighs the risks  His parents participated fully in the discussion at the bedside  They are all agreeable  We will proceed with insertion of a temporary Misha catheter this afternoon in the radiology IR  I have had a discussion with the plasmapheresis nurse, his for recess will likely start late today  Discussion with Nephrology again concerning the institution of plasmapheresis, they they agree that the benefits outweigh the risks  Binocular visual disturbance   Assessment & Plan    There has been no improvement with the high dose steroids however he has only had 3 days  We will continue an additional 2 infusions and start plasmapheresis as well  Cerebrovascular accident (CVA) of left pontine structure ([address] )   Assessment & Plan    No change he remains on his aspirin and statin        Acute renal failure superimposed on stage 2 chronic kidney disease ([address] )   Assessment & Plan    His creatinine and his urinary output are improving  Remains being followed by Nephrology I have reassured his parents that the plasmapheresis will not risk his renal function to the best of our knowledge at this point  Hypertensive urgency   Assessment & Plan    He remains with daily adjustments by Nephrology for his blood pressure meds  Ace inhibitors which the patient was on premorbidly apparently are contraindicated within 24 hours of plasmapheresis he is not on those at this time  Type 1 diabetes mellitus with diabetic nephropathy, with long-term current use of insulin ([facility] [address] )   Assessment & Plan    He had 2 episodes of lower sugar responses overnight  I see no contraindications to plasmapheresis given his type 1 diabetes  History of lacunar cerebrovascular accident (CVA)   Assessment & Plan    The etiology of his previous stroke (2015) is unknown  He has not been on any anti-platelet agents or cholesterol reducing medications in the intervening 2-3 years  He will remain on them post discharge  Subjective:   I had a bad night last night I had 2 episodes were my sugar went down    ROS: 12 system cued query was positive for fatigue  Mild nausea today  Otherwise he reports no headache his vision continues unchanged the remainder of his query is also negative and largely unchanged from previous  Vitals: Blood pressure 155/76, pulse 69, temperature 98 5 °F (36 9 °C), temperature source Oral, resp  rate 14, height 5' 10" (1 778 m), weight 104 kg (229 lb 8 oz), SpO2 99 %  ,Body mass index is 32 01 kg/m²      MEDS:    albumin human 175 g Intravenous Every Other Day   atorvastatin 40 mg Oral QPM   calcium acetate 667 mg Oral TID With Meals   carvedilol 25 mg Oral BID With Meals   clopidogrel 75 mg Oral Daily   docusate sodium 100 mg Oral BID   ergocalciferol 50,000 Units Oral Weekly   heparin (porcine) 5,000 Units Subcutaneous Q8H [address]   hydrALAZINE 10 mg Intravenous Once   hydrALAZINE 100 mg Oral TID   methylPREDNISolone sodium succinate 1,000 mg Intravenous Daily   [START ON 1/30/2018] NIFEdipine 60 mg Oral Daily   [START ON 1/30/2018] pantoprazole 40 mg Oral Early Morning       Physical Exam:    Francisco Sparks seen in:  He is still in bed drowsing and waking at intervals during the extended discussion with his parents  General appearance: alert,   Extremities: atraumatic, no cyanosis or edema    Neurologic:   Mental status: Alert, again he remains completely oriented, spontaneous conversation and thought content appropriate, he is groggy somewhat today but remains cognitively intact,  CN:  Continues with benign ocular right lateral gaze palsy and restriction  Non lateralizing sensory & motor exam, (PP not tested on face)  Reminder CNVIII-XII normal, with again the occasional infrequent reports of left labial leakage  Motor: full power age appropriate x 4 limbs on maneuvers in the bed  Sensory:  Grossly intact  X 4 limbs, PP not tested  Cerebellar: no ataxia or past pointing with maneuvers    Gait:  He was not gaited today      Lab Results:   I have personally reviewed pertinent reports    , CBC:   Results from last 7 days  Lab Units 01/29/18  0625 01/28/18  0420 01/27/18  0432   WBC Thousand/uL 18 65* 17 30*  16 63* 6 01   RBC Million/uL 2 96* 2 78*  2 78* 2 82*   HEMOGLOBIN g/dL 9 1* 8 5*  8 5* 8 6*   HEMATOCRIT % 26 5* 24 7*  24 6* 25 4*   MCV fL 90 89  89 90   PLATELETS Thousands/uL 298 272  273 228   , BMP/CMP:   Results from last 7 days  Lab Units 01/29/18  0633 01/29/18  0625 01/28/18  0420  01/24/18  1534 01/24/18  0508 01/23/18  0450   SODIUM mmol/L 139 139 137  < > 138 140 139   POTASSIUM mmol/L 4 2 4 2 4 3  < > 5 0 4 4 4 2   CHLORIDE mmol/L 106 107 104  < > 106 108 108   CO2 mmol/L 21 21 20*  < > 21 20* 21   ANION GAP mmol/L 12 11 13  < > 11 12 10   BUN mg/dL 71* 71* 70*  < > 47* 47* 43*   CREATININE mg/dL 5 22* 5 24* 6 63*  < > 7 36* 6 80* 4 57*   GLUCOSE RANDOM mg/dL 80 80 165*  < > 109 72 86   CALCIUM mg/dL 8 6 8 5 8 7  < > 7 7* 8 1* 8 3   AST U/L  --   --   --   --  16  --  15   ALT U/L  --   --   --   --  15  --  16   ALK PHOS U/L  --   --   --   --  85  --  75   TOTAL PROTEIN g/dL  --   --   -- --  5 1* 5 0* 5 4*   BILIRUBIN TOTAL mg/dL  --   --   --   --  0 20  --  0 30   EGFR ml/min/1 73sq m 13 13 10  < > 9 10 16   < > = values in this interval not displayed  , Coagulation:   Results from last 7 days  Lab Units 01/29/18  0625   INR  0 93   , Lipid Profile:   Results from last 7 days  Lab Units 01/23/18  0450   HDL mg/dL 38*   CHOLESTEROL mg/dL 215*   LDL CALC mg/dL 144*   TRIGLYCERIDES mg/dL 165*   His fibrinogen level today is 4 69  His ionized calcium is 1 15  Albumin is 2 1  No new neuro imaging or diagnostics        Counseling / Coordination of Care  Total time spent today 60 minutes  Greater than 50% of total time was spent with the patient and / or family counseling and / or coordination of care  A description of the counseling / coordination of care: An extended discussion was held with both his parents at this time concerning his progress and status today  It included still be competing or simultaneous working diagnostic varies testing to date the importance of labs the procedures and the treatment plan as tentatively out outlined at this time for the week  They had several questions they had multiple concerns again I believe they were addressed to their satisfaction at this time  Dictation voice to text software has been used in the creation of this document  Please consider this in light of any contextual or grammatical errors

## 2018-01-29 NOTE — ASSESSMENT & PLAN NOTE
The etiology of his previous stroke (2015) is unknown  He has not been on any anti-platelet agents or cholesterol reducing medications in the intervening 2-3 years  He will remain on them post discharge

## 2018-01-29 NOTE — ASSESSMENT & PLAN NOTE
· Likely due to CVA  · Continue coreg and hydralazine  · Nifedipine recently added, may need titration up  · Still elevated Bps, likely due to steroids  · Continue to monitor

## 2018-01-29 NOTE — PLAN OF CARE
Problem: PAIN - ADULT  Goal: Verbalizes/displays adequate comfort level or baseline comfort level  Interventions:  - Encourage patient to monitor pain and request assistance  - Assess pain using appropriate pain scale  - Administer analgesics based on type and severity of pain and evaluate response  - Implement non-pharmacological measures as appropriate and evaluate response  - Consider cultural and social influences on pain and pain management  - Notify physician/advanced practitioner if interventions unsuccessful or patient reports new pain   Outcome: Progressing      Problem: INFECTION - ADULT  Goal: Absence or prevention of progression during hospitalization  INTERVENTIONS:  - Assess and monitor for signs and symptoms of infection  - Monitor lab/diagnostic results  - Monitor all insertion sites, i e  indwelling lines, tubes, and drains  - Monitor endotracheal (as able) and nasal secretions for changes in amount and color  - [place] appropriate cooling/warming therapies per order  - Administer medications as ordered  - Instruct and encourage patient and family to use good hand hygiene technique  - Identify and instruct in appropriate isolation precautions for identified infection/condition   Outcome: Progressing    Goal: Absence of fever/infection during neutropenic period  INTERVENTIONS:  - Monitor WBC  - Implement neutropenic guidelines   Outcome: Progressing      Problem: SAFETY ADULT  Goal: Patient will remain free of falls  INTERVENTIONS:  - Assess patient frequently for physical needs  -  Identify cognitive and physical deficits and behaviors that affect risk of falls    -  [place] fall precautions as indicated by assessment   - Educate patient/family on patient safety including physical limitations  - Instruct patient to call for assistance with activity based on assessment  - Modify environment to reduce risk of injury  - Consider OT/PT consult to assist with strengthening/mobility    Outcome: Progressing    Goal: Maintain or return to baseline ADL function  INTERVENTIONS:  -  Assess patient's ability to carry out ADLs; assess patient's baseline for ADL function and identify physical deficits which impact ability to perform ADLs (bathing, care of mouth/teeth, toileting, grooming, dressing, etc )  - Assess/evaluate cause of self-care deficits   - Assess range of motion  - Assess patient's mobility; develop plan if impaired  - Assess patient's need for assistive devices and provide as appropriate  - Encourage maximum independence but intervene and supervise when necessary  ¯ Involve family in performance of ADLs  ¯ Assess for home care needs following discharge   ¯ Request OT consult to assist with ADL evaluation and planning for discharge  ¯ Provide patient education as appropriate   Outcome: Progressing    Goal: Maintain or return mobility status to optimal level  INTERVENTIONS:  - Assess patient's baseline mobility status (ambulation, transfers, stairs, etc )    - Identify cognitive and physical deficits and behaviors that affect mobility  - Identify mobility aids required to assist with transfers and/or ambulation (gait belt, sit-to-stand, lift, walker, cane, etc )  - [place] fall precautions as indicated by assessment  - Record patient progress and toleration of activity level on Mobility SBAR; progress patient to next Phase/Stage  - Instruct patient to call for assistance with activity based on assessment  - Request Rehabilitation consult to assist with strengthening/weightbearing, etc    Outcome: Progressing      Problem: Knowledge Deficit  Goal: Patient/family/caregiver demonstrates understanding of disease process, treatment plan, medications, and discharge instructions  Complete learning assessment and assess knowledge base    Interventions:  - Provide teaching at level of understanding  - Provide teaching via preferred learning methods   Outcome: Progressing      Problem: Neurological Deficit  Goal: Neurological status is stable or improving  Interventions:  - Monitor and assess patient's level of consciousness, motor function, sensory function, and level of assistance needed for ADLs  - Monitor and report changes from baseline  Collaborate with interdisciplinary team to initiate plan and implement interventions as ordered  - Provide and maintain a safe environment  - Utilize seizure precautions  - Utilize fall precautions  - Utilize aspiration precautions  - Utilize bleeding precautions  Outcome: Progressing      Problem: Activity Intolerance/Impaired Mobility  Goal: Mobility/activity is maintained at optimum level for patient  Interventions:  - Assess and monitor patient  barriers to mobility and need for assistive/adaptive devices  - Assess patient's emotional response to limitations  - Collaborate with interdisciplinary team and initiate plans and interventions as ordered  - Encourage independent activity per ability   - Maintain proper body alignment  - Perform active/passive rom as tolerated/ordered  - Plan activities to conserve energy   - Turn patient   Outcome: Progressing      Problem: Potential for Falls  Goal: Patient will remain free of falls  INTERVENTIONS:  - Assess patient frequently for physical needs  -  Identify cognitive and physical deficits and behaviors that affect risk of falls    -  [place] fall precautions as indicated by assessment   - Educate patient/family on patient safety including physical limitations  - Instruct patient to call for assistance with activity based on assessment  - Modify environment to reduce risk of injury  - Consider OT/PT consult to assist with strengthening/mobility    Outcome: Progressing      Problem: Nutrition/Hydration-ADULT  Goal: Nutrient/Hydration intake appropriate for improving, restoring or maintaining nutritional needs  Monitor and assess patient's nutrition/hydration status for malnutrition (ex- brittle hair, bruises, dry skin, pale skin and conjunctiva, muscle wasting, smooth red tongue, and disorientation)  Collaborate with interdisciplinary team and initiate plan and interventions as ordered  Monitor patient's weight and dietary intake as ordered or per policy  Utilize nutrition screening tool and intervene per policy  Determine patient's food preferences and provide high-protein, high-caloric foods as appropriate       INTERVENTIONS:  - Monitor oral intake, urinary output, labs, and treatment plans  - Assess nutrition and hydration status and recommend course of action  - Evaluate amount of meals eaten  - Assist patient with eating if necessary   - Allow adequate time for meals  - Recommend/ encourage appropriate diets, oral nutritional supplements, and vitamin/mineral supplements  - Order, calculate, and assess calorie counts as needed  - Recommend, monitor, and adjust tube feedings and TPN/PPN based on assessed needs  - Assess need for intravenous fluids  - Provide specific nutrition/hydration education as appropriate  - Include patient/family/caregiver in decisions related to nutrition   Outcome: Progressing

## 2018-01-29 NOTE — ASSESSMENT & PLAN NOTE
S/p 3 days of high dose solumedrol  No improvement in left gaze palsy  Will continue with two more days of IV solumdrol and plasma exchange will be initialted by neurology 5 treatments every other day  tristan catheter placed today

## 2018-01-29 NOTE — ASSESSMENT & PLAN NOTE
He remains with daily adjustments by Nephrology for his blood pressure meds  Ace inhibitors which the patient was on premorbidly apparently are contraindicated within 24 hours of plasmapheresis he is not on those at this time

## 2018-01-29 NOTE — PROGRESS NOTES
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    Assessment & Plan    S/p 3 days of high dose solumedrol  No improvement in left gaze palsy  For plasma exchange tomorrow        Cerebrovascular accident (CVA) of left pontine structure ([address] )   Assessment & Plan    · Left lateral conjugate gaze palsey with blurred vision, left facial droop, slurred speech and ataxic gait  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   Risk factors include hypertensive urgency, DM1 and significant smoker half to 1/2 packs a day  · Punctate CVA in velia noted on MRI (see addendum from yesterday)  · Demyelination noted on MRI, LP performed, initial CSF studies are unremarkable, MS panel is pending  · Currently being treated with high dose solumdrol  · Outpatient follow up with opthalmology for retinopathy  · Titrate nicardipine drip to ensure gradual reduction in BP  · Patching eyes q2h  · Continue statin  · continue antiplatelets  · PT/OT following        Acute renal failure superimposed on stage 2 chronic kidney disease (HCC)   Assessment & Plan    · Present upon admission, baseline 1 9-2 2  · Suspect ARF due to contrast induced nephropathy and hypertensive urgency with ACEI and NSAID use  · Bicarb infusion per Nephrology completed  · Significant proteinuria noted on Ua, SPEP UPEP pending  · Renal ultrasound is unremarkbale  · low phos, low K diet  · Urine output is adequate  · Creatinine is downtrending  · Continue to monitor BMP daily        Hypertensive urgency   Assessment & Plan    · Likely due to CVA  · Continue coreg and hydralazine  · Nifedipine recently added, may need titration up  · Still elevated Bps, likely due to steroids  · Continue to monitor        Type 1 diabetes mellitus with diabetic nephropathy, with long-term current use of insulin ([facility])   Assessment & Plan    · Worsening glucose due to steroids  · Started on insulin drip maintain while on high dose IV steroids            VTE Pharmacologic Prophylaxis:   Pharmacologic: Heparin  Mechanical VTE Prophylaxis in Place: Yes    Patient Centered Rounds: I have performed bedside rounds with nursing staff today  Discussions with Specialists or Other Care Team Provider: neurology    Education and Discussions with Family / Patient: mother at bedside, plan of care    Time Spent for Care: 15 minutes  More than 50% of total time spent on counseling and coordination of care as described above  Current Length of Stay: 6 day(s)    Current Patient Status: Inpatient   Certification Statement: The patient will continue to require additional inpatient hospital stay due to pending plasma exchange    Discharge Plan: TBD    Code Status: Level 1 - Full Code      Subjective:   Reports improved vision acuity early this AM which was transient  Has continued left gaze palsy  Objective:     Vitals:   Temp (24hrs), Av 2 °F (36 8 °C), Min:97 8 °F (36 6 °C), Max:98 6 °F (37 °C)    HR:  [77-93] 79  Resp:  [14-25] 23  BP: (156-186)/(81-89) 179/87  SpO2:  [97 %-99 %] 99 %  Body mass index is 32 5 kg/m²  Input and Output Summary (last 24 hours): Intake/Output Summary (Last 24 hours) at 18 2243  Last data filed at 18 2100   Gross per 24 hour   Intake           513 87 ml   Output             3295 ml   Net         -2781 13 ml       Physical Exam:     Physical Exam   Constitutional: He is oriented to person, place, and time  He appears well-developed and well-nourished  HENT:   Head: Normocephalic and atraumatic  Eyes: EOM are normal  Pupils are equal, round, and reactive to light  No scleral icterus  Neck: Neck supple  No JVD present  Cardiovascular: Normal rate and regular rhythm      Pulmonary/Chest: Effort normal  He has no wheezes  He has no rales  Abdominal: Soft  There is no tenderness  Musculoskeletal: He exhibits edema  He exhibits no tenderness  Neurological: He is alert and oriented to person, place, and time  A cranial nerve deficit is present  Skin: Skin is warm and dry  Additional Data:     Labs:      Results from last 7 days  Lab Units 01/28/18  0420   WBC Thousand/uL 17 30*  16 63*   HEMOGLOBIN g/dL 8 5*  8 5*   HEMATOCRIT % 24 7*  24 6*   PLATELETS Thousands/uL 272  273   NEUTROS PCT % 88*   LYMPHS PCT % 6*   MONOS PCT % 6   EOS PCT % 0       Results from last 7 days  Lab Units 01/28/18  0420  01/24/18  1534   SODIUM mmol/L 137  < > 138   POTASSIUM mmol/L 4 3  < > 5 0   CHLORIDE mmol/L 104  < > 106   CO2 mmol/L 20*  < > 21   BUN mg/dL 70*  < > 47*   CREATININE mg/dL 6 63*  < > 7 36*   CALCIUM mg/dL 8 7  < > 7 7*   TOTAL PROTEIN g/dL  --   --  5 1*   BILIRUBIN TOTAL mg/dL  --   --  0 20   ALK PHOS U/L  --   --  85   ALT U/L  --   --  15   AST U/L  --   --  16   GLUCOSE RANDOM mg/dL 165*  < > 109   < > = values in this interval not displayed  Results from last 7 days  Lab Units 01/25/18  1202   INR  0 95       * I Have Reviewed All Lab Data Listed Above  * Additional Pertinent Lab Tests Reviewed:  All Labs Within Last 24 Hours Reviewed        Recent Cultures (last 7 days):       Results from last 7 days  Lab Units 01/26/18  1037   GRAM STAIN RESULT  No No polys seen  No No bacteria seen       Last 24 Hours Medication List:     [START ON 1/29/2018] albumin human 175 g Intravenous Every Other Day   atorvastatin 40 mg Oral QPM   calcium acetate 667 mg Oral TID With Meals   carvedilol 25 mg Oral BID With Meals   clopidogrel 75 mg Oral Daily   docusate sodium 100 mg Oral BID   ergocalciferol 50,000 Units Oral Weekly   heparin (porcine) 5,000 Units Subcutaneous Q8H [address]   hydrALAZINE 10 mg Intravenous Once   hydrALAZINE 100 mg Oral TID   insulin glargine 25 Units Subcutaneous Q12H [address]   [START ON 1/29/2018] insulin lispro 15 Units Subcutaneous TID With Meals   [START ON 1/29/2018] insulin lispro 2-12 Units Subcutaneous TID With Meals   [START ON 1/29/2018] insulin lispro 2-12 Units Subcutaneous HS   NIFEdipine 30 mg Oral Daily   pantoprazole 40 mg Oral Early Morning        Today, Patient Was Seen By: Katerina Landry MD    ** Please Note: Dictation voice to text software may have been used in the creation of this document   **

## 2018-01-29 NOTE — ASSESSMENT & PLAN NOTE
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

## 2018-01-29 NOTE — PLAN OF CARE

## 2018-01-29 NOTE — ASSESSMENT & PLAN NOTE
· Worsening glucose due to steroids  · Started on insulin drip maintain while on high dose IV steroids

## 2018-01-29 NOTE — ASSESSMENT & PLAN NOTE
· Present upon admission, baseline 1 9-2 2  · Suspect ARF due to contrast induced nephropathy and hypertensive urgency with ACEI and NSAID use  · Bicarb infusion per Nephrology completed  · Significant proteinuria noted on Ua, SPEP UPEP pending  · Renal ultrasound is unremarkbale  · low phos, low K diet  · Urine output is adequate  · Creatinine is downtrending  · Continue to monitor BMP daily

## 2018-01-29 NOTE — ASSESSMENT & PLAN NOTE
He had 2 episodes of lower sugar responses overnight  I see no contraindications to plasmapheresis given his type 1 diabetes

## 2018-01-29 NOTE — PROCEDURES
Central Line Insertion  Date/Time: 1/29/2018 2:43 PM  Performed by: Mariel Greene by: Augustine Deng     Patient location:  IR  Consent:     Consent obtained:  Written    Consent given by:  Patient    Risks discussed:  Bleeding and infection    Alternatives discussed:  No treatment  Universal protocol:     Procedure explained and questions answered to patient or proxy's satisfaction: yes      Relevant documents present and verified: yes      Test results available and properly labeled: yes      Imaging studies available: yes      Required blood products, implants, devices, and special equipment available: yes      Site/side marked: yes      Immediately prior to procedure, a time out was called: yes      Patient identity confirmed:  Verbally with patient and arm band  Pre-procedure details:     Hand hygiene: Hand hygiene performed prior to insertion      Sterile barrier technique: All elements of maximal sterile technique followed      Skin preparation:  2% chlorhexidine    Skin preparation agent: Skin preparation agent completely dried prior to procedure    Indications:     Central line indications: other (comment)      Central line indications comment:  Plasmapheresis  Anesthesia (see MAR for exact dosages):      Anesthesia method:  Local infiltration    Local anesthetic:  Lidocaine 1% w/o epi  Procedure details:     Location:  Left internal jugular    Vessel type: vein      Laterality:  Right    Approach: percutaneous technique used      Patient position:  Flat    Catheter type:  Double lumen    Catheter size:  14 Fr    Landmarks identified: yes      Ultrasound guidance: yes      Sterile ultrasound techniques: Sterile gel and sterile probe covers were used      Number of attempts:  1    Successful placement: yes    Post-procedure details:     Post-procedure:  Line sutured and dressing applied    Assessment:  Blood return through all ports, free fluid flow, placement verified by x-ray and no pneumothorax on x-ray    Post-procedure complications: none      Patient tolerance of procedure:   Tolerated well, no immediate complications

## 2018-01-29 NOTE — PROGRESS NOTES
Progress Note - Miko Mckeon 27 y o  male MRN: [id number]    Unit/Bed#:  Encounter: [id number]      CC: diabetes f/u    Subjective: Devi Robles is a 27y o  year old male with type 1 diabetes  Feels well  No complaints  No hypoglycemia  Was started on steroids , high dose for multiple sclerosis   methylPREDNISolone sodium succinate (Solu-MEDROL) 1,000 mg in sodium chloride 0 9 % 250 mL IVPB was given today and scheduled again in AM      blood sugars tightly controlled  He is NPO for procedure  Currently insulin drip is continuing      Objective:     Vitals: Blood pressure 155/76, pulse 69, temperature 98 5 °F (36 9 °C), temperature source Oral, resp  rate 14, height 5' 10" (1 778 m), weight 104 kg (229 lb 8 oz), SpO2 99 %  ,Body mass index is 32 01 kg/m²  Intake/Output Summary (Last 24 hours) at 01/29/18 1445  Last data filed at 01/29/18 1400   Gross per 24 hour   Intake          1085 99 ml   Output             3675 ml   Net         -2589 01 ml       Physical Exam:  General Appearance: awake, appears stated age and cooperative  Head: Normocephalic, without obvious abnormality, atraumatic  Extremities: moves all extremities  Skin: Skin color and temperature normal    Pulm: no labored breathing    Lab, Imaging and other studies: I have personally reviewed pertinent reports  POC Glucose (mg/dl)   Date Value   01/29/2018 84   01/29/2018 104   01/29/2018 92   01/29/2018 92   01/29/2018 68   01/29/2018 63 (L)   01/29/2018 90   01/29/2018 93   01/29/2018 112   01/28/2018 225 (H)       Assessment:  Type 1 diabetes with hyperglycemia/hypoglycemia, currently on a very high dose of Solu-Medrol for multiple sclerosis, the last dose is going to be tomorrow, in order to prevent fluctuation in blood sugars and postprandial excursion will continued insulin infusion for now        Plan:  -switch to basal bolus insulin regimen once he finish bolus steroids   -continue to monitor fingersticks every 2 hour, and adjust the drip accordingly  -hypoglycemia protocol is in place  -will continue to monitor and make further adjustment    Maciel Flowers MD        Portions of the record may have been created with voice recognition software

## 2018-01-29 NOTE — SOCIAL WORK
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

## 2018-01-30 LAB
ACE CSF-CCNC: 1.1 U/L (ref 0–2.5)
ANION GAP SERPL CALCULATED.3IONS-SCNC: 9 MMOL/L (ref 4–13)
BUN SERPL-MCNC: 69 MG/DL (ref 5–25)
CALCIUM SERPL-MCNC: 8.2 MG/DL (ref 8.3–10.1)
CHLORIDE SERPL-SCNC: 107 MMOL/L (ref 100–108)
CO2 SERPL-SCNC: 21 MMOL/L (ref 21–32)
CREAT SERPL-MCNC: 4.77 MG/DL (ref 0.6–1.3)
ERYTHROCYTE [DISTWIDTH] IN BLOOD BY AUTOMATED COUNT: 13.4 % (ref 11.6–15.1)
F2 GENE MUT ANL BLD/T: ABNORMAL
GFR SERPL CREATININE-BSD FRML MDRD: 15 ML/MIN/1.73SQ M
GLUCOSE SERPL-MCNC: 128 MG/DL (ref 65–140)
GLUCOSE SERPL-MCNC: 141 MG/DL (ref 65–140)
GLUCOSE SERPL-MCNC: 144 MG/DL (ref 65–140)
GLUCOSE SERPL-MCNC: 161 MG/DL (ref 65–140)
GLUCOSE SERPL-MCNC: 173 MG/DL (ref 65–140)
GLUCOSE SERPL-MCNC: 191 MG/DL (ref 65–140)
GLUCOSE SERPL-MCNC: 195 MG/DL (ref 65–140)
GLUCOSE SERPL-MCNC: 196 MG/DL (ref 65–140)
GLUCOSE SERPL-MCNC: 202 MG/DL (ref 65–140)
GLUCOSE SERPL-MCNC: 211 MG/DL (ref 65–140)
GLUCOSE SERPL-MCNC: 259 MG/DL (ref 65–140)
GLUCOSE SERPL-MCNC: 278 MG/DL (ref 65–140)
GLUCOSE SERPL-MCNC: 99 MG/DL (ref 65–140)
HCT VFR BLD AUTO: 28.3 % (ref 36.5–49.3)
HGB BLD-MCNC: 9.5 G/DL (ref 12–17)
Lab: ABNORMAL
MCH RBC QN AUTO: 30.4 PG (ref 26.8–34.3)
MCHC RBC AUTO-ENTMCNC: 33.6 G/DL (ref 31.4–37.4)
MCV RBC AUTO: 90 FL (ref 82–98)
PLATELET # BLD AUTO: 329 THOUSANDS/UL (ref 149–390)
PMV BLD AUTO: 10.8 FL (ref 8.9–12.7)
POTASSIUM SERPL-SCNC: 4.4 MMOL/L (ref 3.5–5.3)
RBC # BLD AUTO: 3.13 MILLION/UL (ref 3.88–5.62)
SODIUM SERPL-SCNC: 137 MMOL/L (ref 136–145)
WBC # BLD AUTO: 14.93 THOUSAND/UL (ref 4.31–10.16)

## 2018-01-30 PROCEDURE — 85027 COMPLETE CBC AUTOMATED: CPT | Performed by: NURSE PRACTITIONER

## 2018-01-30 PROCEDURE — 99232 SBSQ HOSP IP/OBS MODERATE 35: CPT | Performed by: NURSE PRACTITIONER

## 2018-01-30 PROCEDURE — 82948 REAGENT STRIP/BLOOD GLUCOSE: CPT

## 2018-01-30 PROCEDURE — 97110 THERAPEUTIC EXERCISES: CPT

## 2018-01-30 PROCEDURE — 99232 SBSQ HOSP IP/OBS MODERATE 35: CPT | Performed by: INTERNAL MEDICINE

## 2018-01-30 PROCEDURE — 99223 1ST HOSP IP/OBS HIGH 75: CPT | Performed by: PHYSICIAN ASSISTANT

## 2018-01-30 PROCEDURE — 97530 THERAPEUTIC ACTIVITIES: CPT

## 2018-01-30 PROCEDURE — 97116 GAIT TRAINING THERAPY: CPT

## 2018-01-30 PROCEDURE — 80048 BASIC METABOLIC PNL TOTAL CA: CPT | Performed by: NURSE PRACTITIONER

## 2018-01-30 RX ORDER — CARVEDILOL 12.5 MG/1
25 TABLET ORAL 2 TIMES DAILY WITH MEALS
Status: DISCONTINUED | OUTPATIENT
Start: 2018-01-30 | End: 2018-02-05 | Stop reason: HOSPADM

## 2018-01-30 RX ORDER — HYDRALAZINE HYDROCHLORIDE 25 MG/1
50 TABLET, FILM COATED ORAL 3 TIMES DAILY
Status: DISCONTINUED | OUTPATIENT
Start: 2018-01-30 | End: 2018-01-30

## 2018-01-30 RX ORDER — HYDRALAZINE HYDROCHLORIDE 25 MG/1
100 TABLET, FILM COATED ORAL 3 TIMES DAILY
Status: DISCONTINUED | OUTPATIENT
Start: 2018-01-30 | End: 2018-02-05 | Stop reason: HOSPADM

## 2018-01-30 RX ORDER — HYDRALAZINE HYDROCHLORIDE 25 MG/1
25 TABLET, FILM COATED ORAL ONCE
Status: COMPLETED | OUTPATIENT
Start: 2018-01-30 | End: 2018-01-30

## 2018-01-30 RX ORDER — HYDRALAZINE HYDROCHLORIDE 25 MG/1
25 TABLET, FILM COATED ORAL 3 TIMES DAILY
Status: DISCONTINUED | OUTPATIENT
Start: 2018-01-30 | End: 2018-01-30

## 2018-01-30 RX ORDER — NIFEDIPINE 30 MG/1
30 TABLET, EXTENDED RELEASE ORAL DAILY
Status: DISCONTINUED | OUTPATIENT
Start: 2018-01-31 | End: 2018-01-31

## 2018-01-30 RX ORDER — CARVEDILOL 12.5 MG/1
12.5 TABLET ORAL 2 TIMES DAILY WITH MEALS
Status: DISCONTINUED | OUTPATIENT
Start: 2018-01-30 | End: 2018-01-30

## 2018-01-30 RX ORDER — NIFEDIPINE 30 MG/1
30 TABLET, EXTENDED RELEASE ORAL ONCE
Status: COMPLETED | OUTPATIENT
Start: 2018-01-30 | End: 2018-01-30

## 2018-01-30 RX ADMIN — DOCUSATE SODIUM 100 MG: 100 CAPSULE, LIQUID FILLED ORAL at 17:58

## 2018-01-30 RX ADMIN — HYDRALAZINE HYDROCHLORIDE 100 MG: 25 TABLET, FILM COATED ORAL at 15:31

## 2018-01-30 RX ADMIN — HYDRALAZINE HYDROCHLORIDE 10 MG: 20 INJECTION INTRAMUSCULAR; INTRAVENOUS at 11:39

## 2018-01-30 RX ADMIN — HYDRALAZINE HYDROCHLORIDE 100 MG: 25 TABLET, FILM COATED ORAL at 20:31

## 2018-01-30 RX ADMIN — CALCIUM ACETATE 667 MG: 667 CAPSULE ORAL at 16:04

## 2018-01-30 RX ADMIN — SODIUM CHLORIDE 1000 MG: 0.9 INJECTION, SOLUTION INTRAVENOUS at 10:00

## 2018-01-30 RX ADMIN — DOCUSATE SODIUM 100 MG: 100 CAPSULE, LIQUID FILLED ORAL at 08:48

## 2018-01-30 RX ADMIN — Medication 8 UNITS/HR: at 13:30

## 2018-01-30 RX ADMIN — CARVEDILOL 25 MG: 12.5 TABLET, FILM COATED ORAL at 16:04

## 2018-01-30 RX ADMIN — CALCIUM ACETATE 667 MG: 667 CAPSULE ORAL at 11:39

## 2018-01-30 RX ADMIN — NIFEDIPINE 30 MG: 30 TABLET, FILM COATED, EXTENDED RELEASE ORAL at 13:35

## 2018-01-30 RX ADMIN — HEPARIN SODIUM 5000 UNITS: 5000 INJECTION, SOLUTION INTRAVENOUS; SUBCUTANEOUS at 22:02

## 2018-01-30 RX ADMIN — CARVEDILOL 12.5 MG: 12.5 TABLET, FILM COATED ORAL at 08:52

## 2018-01-30 RX ADMIN — ATORVASTATIN CALCIUM 40 MG: 40 TABLET, FILM COATED ORAL at 17:58

## 2018-01-30 RX ADMIN — HYDRALAZINE HYDROCHLORIDE 25 MG: 25 TABLET, FILM COATED ORAL at 08:59

## 2018-01-30 RX ADMIN — CLOPIDOGREL BISULFATE 75 MG: 75 TABLET ORAL at 08:48

## 2018-01-30 RX ADMIN — HEPARIN SODIUM 5000 UNITS: 5000 INJECTION, SOLUTION INTRAVENOUS; SUBCUTANEOUS at 13:35

## 2018-01-30 RX ADMIN — HYDRALAZINE HYDROCHLORIDE 25 MG: 25 TABLET, FILM COATED ORAL at 08:47

## 2018-01-30 RX ADMIN — PANTOPRAZOLE SODIUM 40 MG: 40 TABLET, DELAYED RELEASE ORAL at 06:12

## 2018-01-30 RX ADMIN — CALCIUM ACETATE 667 MG: 667 CAPSULE ORAL at 08:30

## 2018-01-30 RX ADMIN — HEPARIN SODIUM 5000 UNITS: 5000 INJECTION, SOLUTION INTRAVENOUS; SUBCUTANEOUS at 06:13

## 2018-01-30 NOTE — PROGRESS NOTES
Per Jacquie Acevedo, Plasmapheresis technician, please hold hydralazine 100 mg prior to plasmapheresis  OK to hold per H   Jai ANG

## 2018-01-30 NOTE — PROGRESS NOTES
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   Active Problems:    Cerebrovascular accident (CVA) of left pontine structure ([facility] )    Acute renal failure superimposed on stage 3 chronic kidney disease ([facility] )    Hypertensive urgency    Type 1 diabetes mellitus with diabetic nephropathy, with long-term current use of insulin ([facility] )    History of lacunar cerebrovascular accident (CVA)    Hyperphosphatemia    Vitamin D deficiency    Azotemia      Plan:  1  Subacute CVA with enhancement on brain MRI, rule out demyelinating process, previous CVA,  clinically with left lateral gaze palsy and left facial weakness, did not improve on IV steroids, started on plasmapheresis, s/p  LP follow on MS panel, continue Plavix and statin, hematology consulted, Neurology is following  2  LLUVIA /CKD stage III with proteinuria, secondary to ATN with contrast induced nephropathy,  negative renal ultrasound, negative SPEP,  UPEP  creatinine is improving,  nephrology is following  3  Hypertensive urgency, nephrology is following, currently on Coreg, nifedipine, and hydralazine  4  Diabetes mellitus type 1 with A1c 6 4, endocrine is following, on insulin drip  5  Vitamin-D deficiency, on vitamin-D supplement  6  Leukocytosis, likely secondary to steroid, monitor  7  Tobacco smoking, refused nicotine patch       VTE Pharmacologic Prophylaxis:   Pharmacologic: Heparin  Mechanical VTE Prophylaxis in Place: Yes    Patient Centered Rounds: I have performed bedside rounds with nursing staff today  Discussions with Specialists or Other Care Team Provider:     Education and Discussions with Family / Patient:  Patient and his mother at bedside    Time Spent for Care: 30 minutes    More than 50% of total time spent on counseling and coordination of care as described above  Current Length of Stay: 8 day(s)    Current Patient Status: Inpatient   Certification Statement: The patient will continue to require additional inpatient hospital stay due to Management of acute CVA    Discharge Plan / Estimated Discharge Date: not ready yet    Code Status: Level 1 - Full Code      Subjective:   Patient seen and examined  Comfortable sitting in chair  No chest pain or shortness of breath    Objective:     Vitals:   Temp (24hrs), Av 4 °F (36 9 °C), Min:98 2 °F (36 8 °C), Max:98 8 °F (37 1 °C)    HR:  [67-84] 70  Resp:  [13-18] 16  BP: (146-191)/() 185/90  SpO2:  [98 %-99 %] 98 %  Body mass index is 31 73 kg/m²  Input and Output Summary (last 24 hours): Intake/Output Summary (Last 24 hours) at 18 1354  Last data filed at 18 1350   Gross per 24 hour   Intake          1646 41 ml   Output             2490 ml   Net          -843 59 ml       Physical Exam:     Physical Exam  Patient is awake alert in no acute distress  Lung clear to auscultation bilateral  Heart positive S1-S2 no murmur  Abdomen soft nontender positive bowel sounds  Lower extremity 1+ pitting edema    Additional Data:     Labs:      Results from last 7 days  Lab Units 18  1001  18  0420   WBC Thousand/uL 14 93*  < > 17 30*  16 63*   HEMOGLOBIN g/dL 9 5*  < > 8 5*  8 5*   HEMATOCRIT % 28 3*  < > 24 7*  24 6*   PLATELETS Thousands/uL 329  < > 272  273   NEUTROS PCT %  --   --  88*   LYMPHS PCT %  --   --  6*   MONOS PCT %  --   --  6   EOS PCT %  --   --  0   < > = values in this interval not displayed      Results from last 7 days  Lab Units 18  1001  18  1534   SODIUM mmol/L 137  < > 138   POTASSIUM mmol/L 4 4  < > 5 0   CHLORIDE mmol/L 107  < > 106   CO2 mmol/L 21  < > 21   BUN mg/dL 69*  < > 47*   CREATININE mg/dL 4 77*  < > 7 36*   CALCIUM mg/dL 8 2*  < > 7 7*   TOTAL PROTEIN g/dL  -- --  5 1*   BILIRUBIN TOTAL mg/dL  --   --  0 20   ALK PHOS U/L  --   --  85   ALT U/L  --   --  15   AST U/L  --   --  16   GLUCOSE RANDOM mg/dL 278*  < > 109   < > = values in this interval not displayed  Results from last 7 days  Lab Units 01/29/18  0625   INR  0 93       * I Have Reviewed All Lab Data Listed Above  * Additional Pertinent Lab Tests Reviewed:  [facility] Admission Reviewed    Imaging:    Imaging Reports Reviewed Today Include:   Imaging Personally Reviewed by Myself Includes:      Recent Cultures (last 7 days):       Results from last 7 days  Lab Units 01/26/18  1037   GRAM STAIN RESULT  No No polys seen  No No bacteria seen       Last 24 Hours Medication List:     Current Facility-Administered Medications:  acetaminophen 650 mg Oral Q4H PRN Justina Keene PA-C    albumin human 175 g Intravenous Every Other Day DEISI Lucio    atorvastatin 40 mg Oral QPM Justina Keene PA-C    calcium acetate 667 mg Oral TID With [employer]    carvedilol 25 mg Oral BID With Meals DEISI Velasquez    clopidogrel 75 mg Oral Daily Ryan Mancera MD    diphenhydrAMINE 25 mg Oral HS PRN Ryan Mancera MD    docusate sodium 100 mg Oral BID Justina Keene PA-C    ergocalciferol 50,000 Units Oral Weekly Erikatam Green PA-C    heparin (porcine) 5,000 Units Subcutaneous Q8H [facility] Ryan Mancera MD    hydrALAZINE 10 mg Intravenous Once Ryan Mancera MD    hydrALAZINE 10 mg Intravenous Q6H PRN Wilma Pennington PA-C    hydrALAZINE 100 mg Oral TID Sohan Valle MD    insulin regular (HumuLIN R,NovoLIN R) infusion 0 3-21 Units/hr Intravenous Titrated Samina Brisceo MD Last Rate: 8 Units/hr (01/30/18 1330)   labetalol 10 mg Intravenous Q4H PRN Justina Keene PA-C    LORazepam 0 5 mg Oral Q8H PRN Ryan Mancera MD    niCARdipine 1-15 mg/hr Intravenous Titrated Ramona Anthony PA-C Last Rate: Stopped (01/26/18 1198)   [START ON 1/31/2018] NIFEdipine 30 mg Oral Daily Sohan Valle MD ondansetron 4 mg Intravenous Q6H PRN Justina Keene PA-C    pantoprazole 40 mg Oral Early Morning DEISI Lucio    promethazine 25 mg Intravenous Q6H PRN Declan Pathak MD         Today, Patient Was Seen By: Johana Jarrett DO    ** Please Note: This note has been constructed using a voice recognition system   **

## 2018-01-30 NOTE — PHYSICAL THERAPY NOTE
Physical Therapy Progress Note     01/30/18 1324   Pain Assessment   Pain Assessment No/denies pain   Pain Score No Pain   Restrictions/Precautions   Weight Bearing Precautions Per Order No   Other Precautions Multiple lines;Telemetry; Fall Risk;Visual impairment   General   Chart Reviewed Yes   Family/Caregiver Present No   Cognition   Overall Cognitive Status WFL   Subjective   Subjective Agreeable to PT  Used two forms of patient identification - Name, birthdate and  patient's bracelet ID   Transfers   Sit to Stand 5  Supervision   Additional items Assist x 1; Armrests   Stand to Sit 5  Supervision   Additional items Assist x 1; Armrests   Ambulation/Elevation   Gait pattern Decreased foot clearance; Inconsistent kailee  (mild instability occ )   Gait Assistance 4  Minimal assist   Additional items Assist x 1;Verbal cues   Assistive Device (IV pole)   Distance 250ft   Balance   Static Sitting Good   Static Standing Fair   Ambulatory Fair -  (with IV pole)   Endurance Deficit   Endurance Deficit Yes   Endurance Deficit Description Fatigue   Activity Tolerance   Activity Tolerance Patient limited by fatigue   Nurse Made Aware Yes   Exercises   Quad Sets Sitting;Bilateral;AROM;20 reps   Heelslides Sitting;Bilateral;AROM;20 reps   Hip Flexion Bilateral;AROM;20 reps; Sitting   Hip Abduction Bilateral;AROM;20 reps; Sitting   Knee AROM Long Arc Quad Bilateral;Sitting;20 reps;AROM   Ankle Pumps Bilateral;AROM;20 reps; Sitting   Marching Standing;20 reps;Bilateral   TKR Bilateral;AROM;20 reps;Standing   Balance training  FT standing with head turns   Assessment   Prognosis Good   Problem List Decreased strength;Decreased endurance;Decreased range of motion; Impaired balance;Decreased mobility; Impaired vision;Decreased coordination   Assessment Patient seen for pT session  Reported slight soreness in the cervical area  patient noted to be very guarded with all levels of mobility   Patient able to ambulate longer distance today with IV pole and slight unsteadiness noted  Patient needed IV pole for a steadier gait as patient unsteady without support  Patient unable to perform any standing dynamic actiivity without BUE support  Needed 1 UE support for standing marches, ham curls and statsic standing with NBOS and head turns  Patient performed LE AROM in long sitting, sitting and standing positions  Patient noted with higher HR with exertion 88-97 bpm noted  however HR decreased to low to mid 80s with rest  Patient fatigued at the end of session  Will continue to follow during the stay to improve functional mobility  Noted patient tend to lookse balance to the R commonly  Goals   Patient Goals None reported   STG Expiration Date 02/03/18   Plan   Treatment/Interventions Functional transfer training;LE strengthening/ROM; Therapeutic exercise; Endurance training;Patient/family training;Equipment eval/education;Gait training;Spoke to nursing   PT Frequency 5x/wk   Recommendation   Recommendation Post acute IP rehab  (unless patient progress to more I level of function)   Equipment Recommended Other (Comment)  (TBD possibly cane)         Rose Mary Gleason, PTA

## 2018-01-30 NOTE — PLAN OF CARE
Problem: PHYSICAL THERAPY ADULT  Goal: Performs mobility at highest level of function for planned discharge setting  See evaluation for individualized goals  Treatment/Interventions: Functional transfer training, LE strengthening/ROM, Elevations, Therapeutic exercise, Equipment eval/education, Bed mobility, Gait training, Patient/family training, Cognitive reorientation, Endurance training, Spoke to nursing  Equipment Recommended: Other (Comment) (cane TBD)       See flowsheet documentation for full assessment, interventions and recommendations  Outcome: Progressing  Prognosis: Good  Problem List: Decreased strength, Decreased endurance, Decreased range of motion, Impaired balance, Decreased mobility, Impaired vision, Decreased coordination  Assessment: Patient seen for pT session  Reported slight soreness in the cervical area  patient noted to be very guarded with all levels of mobility  Patient able to ambulate longer distance today with IV pole and slight unsteadiness noted  Patient needed IV pole for a steadier gait as patient unsteady without support  Patient unable to perform any standing dynamic actiivity without BUE support  Needed 1 UE support for standing marches, ham curls and statsic standing with NBOS and head turns  Patient performed LE AROM in long sitting, sitting and standing positions  Patient noted with higher HR with exertion 88-97 bpm noted  however HR decreased to low to mid 80s with rest  Patient fatigued at the end of session  Will continue to follow during the stay to improve functional mobility  Noted patient tend to lookse balance to the R commonly  Barriers to Discharge: Inaccessible home environment (NIRU And stairs inside of home)     Recommendation: Post acute IP rehab (unless patient progress to more I level of function)          See flowsheet documentation for full assessment

## 2018-01-30 NOTE — PROGRESS NOTES
NEPHROLOGY PROGRESS NOTE   Marily Mckeon 27 y o  male MRN: [id number]  Unit/Bed#:  Encounter: [id number]  Reason for Consult:  Acute kidney injury on chronic kidney disease    ASSESSMENT and PLAN:    The patient is a 75-year-old male with a history of type 1 diabetes mellitus, hypertension, CVA, smoker, hyperlipidemia and chronic kidney disease who was lost to renal follow-up  He presented on 01/22/2018 with a change in vision  Patient was found to have hypertensive urgency  Additionally MRI showed punctate left lateral pontine infarct along with concerns for demyelinating disorder  Creatinine peaked at 8 33  Nephrology following for management of acute on chronic CKD and hypertension   -plasma exchange initiated on 01/29/2018     1  Acute kidney injury:  Creatinine peaked at 8 33 -->5 22 on 01/29  · LLUVIA due to ATN due to uncontrolled hypertension, contrast exposure in the setting of altered renal hemodynamics due to ACE-inhibitor, NSAIDs also contribute  · Renal function slowly improving  Check labs this morning  · Appears to be in the diuretic phase of ATN-urine output 3 5 L  · Renal ultrasound right kidney 12 6 cm, left kidney 12 4 cm, normal appearing kidneys with no hydronephrosis noted  · Significant proteinuria >9g  (Consideration for biopsy but will likely not change the course of management at this point and certainly would be contraindicated due to uncontrolled hypertension-will continue to evaluate)  · Proteinuria:  SPEP, UPEP negative for monoclonal gammopathy  · ANCA negative, CARMEN negative, hepatitis-C antibody negative  2  Chronic kidney disease:  Baseline creatinine unknown  Last creatinine 2 3 in 2017  · Etiology of CKD-diabetic nephropathy, hypertensive nephrosclerosis   3  Proteinuria:  SPEP, UPEP pending   4   Hypertensive emergency:  · On Coreg 25 mg twice a day, hydralazine 100 mg 3 times a day and nifedipine 30 mg daily  · Continue p r n  hydralazine and labetalol  · Blood pressure ranging from 042 to 629 systolic this morning  Decrease Coreg to 12 5 mg twice a day, decrease hydralazine to 50 mg 3 times a day and hold nifedipine  · Continue to closely monitor blood pressure  Can give a dose of nifedipine later this morning if blood pressure > 160  5  Vision disturbance:  CNS demyelination suspected vs chronic ischemic disease  Patient on high-dose steroids; for plasma exchange starting today  Neurology following  · For plasmapheresis every other day for 10 days  · Status post lumbar puncture  So far workup negative  · For steroid prophylaxis avoid Bactrim due to LLUVIA; atovaquone preferred  6  CVA: Acute left pontine  Neurology following  7  Hyperphosphatemia:  On PhosLo, renal diet  Monitor phosphorus level  8  Diabetes mellitus:  Per primary team  9  Anemia:  Iron saturation 28%, ferritin 222  10  Vitamin-D deficiency:  Vitamin-D level 10 9-on ergocalciferol weekly x 12   Addendum:  Blood pressure again rising  Will give patient a dose of nifedipine and increase Coreg back to 25 mg every 12 hours  Continue to closely monitor  SUMMARY OF RECOMMENDATIONS:  · Await results of a m  labs  · Decrease hydralazine to 50 mg every 8 hours  · Decrease Coreg to 12 5 mg every 12 hours  · Hold nifedipine  If blood pressure remains elevated we can dose nifedipine later this morning  · Continue p r n  Medications  · Avoid hypotension    SUBJECTIVE / INTERVAL HISTORY:  No complaints  Tolerated pheresis yesterday  No issues at this time      OBJECTIVE:  Current Weight: Weight - Scale: 103 kg (227 lb 8 2 oz)  Vitals:    01/30/18 0730 01/30/18 0735 01/30/18 0745 01/30/18 0800   BP: 153/77 146/77 154/78 156/78   BP Location:  Right arm     Pulse: 69 73 68 67   Resp:  16     Temp:  98 8 °F (37 1 °C)     TempSrc:  Oral     SpO2:       Weight:       Height:           Intake/Output Summary (Last 24 hours) at 01/30/18 0850  Last data filed at 01/30/18 0836   Gross per 24 hour Intake          1037 93 ml   Output             3290 ml   Net         -2252 07 ml     General:  No acute distress  Quietly lying in bed  Family at bedside  Skin:  Warm and dry, no rash  Eyes:  Sclera clear, rightward gaze noted  ENT:  Oropharynx moist  Neck:  Supple, no JVD  Chest:  Clear bilaterally  No wheezes, rhonchi, rales  CVS:  Regular rhythm, no murmur, rub, gallop appreciated  Abdomen:  Soft, nondistended, nontender, bowel sounds present  Extremities:  Trace edema  :  No Hoffman  Neuro:  Oriented to surroundings    Left visual field defect  Psych:  Appropriate for circumstances    Medications:    Current Facility-Administered Medications:     acetaminophen (TYLENOL) tablet 650 mg, 650 mg, Oral, Q4H PRN, Justina Keene PA-C    albumin human (FLEXBUMIN) 5 % injection 175 g, 175 g, Intravenous, Every Other Day, DEISI Lucio, 175 g at 01/29/18 2202    atorvastatin (LIPITOR) tablet 40 mg, 40 mg, Oral, QPM, Justina Keene PA-C, 40 mg at 01/29/18 1713    calcium acetate (PHOSLO) capsule 667 mg, 667 mg, Oral, TID With Meals, Tori Khanna PA-C, 667 mg at 01/30/18 0830    carvedilol (COREG) tablet 12 5 mg, 12 5 mg, Oral, BID With Meals, Alvia Dance, CRNP    clopidogrel (PLAVIX) tablet 75 mg, 75 mg, Oral, Daily, Judge Nikkie MD, 75 mg at 01/30/18 0848    diphenhydrAMINE (BENADRYL) tablet 25 mg, 25 mg, Oral, HS PRN, Judge Nikkie MD    docusate sodium (COLACE) capsule 100 mg, 100 mg, Oral, BID, Justina Keene PA-C, 100 mg at 01/30/18 0848    ergocalciferol (VITAMIN D2) capsule 50,000 Units, 50,000 Units, Oral, Weekly, Erika Green PA-C, 50,000 Units at 01/26/18 0800    heparin (porcine) subcutaneous injection 5,000 Units, 5,000 Units, Subcutaneous, Q8H [address], Judge Nikkie MD, 5,000 Units at 01/30/18 3774    hydrALAZINE (APRESOLINE) injection 10 mg, 10 mg, Intravenous, Once, Judge Nikkie MD    hydrALAZINE (APRESOLINE) injection 10 mg, 10 mg, Intravenous, Q6H PRN, Wilma Pennington PA-C, 10 mg at 01/29/18 0208    hydrALAZINE (APRESOLINE) tablet 50 mg, 50 mg, Oral, TID, DEISI Easley    insulin regular (HumuLIN R,NovoLIN R) 1 Units/mL in sodium chloride 0 9 % 100 mL infusion, 0 3-21 Units/hr, Intravenous, Titrated, Thi Akhtar MD, Last Rate: 1 5 mL/hr at 01/30/18 0619, 1 5 Units/hr at 01/30/18 0619    labetalol (NORMODYNE) injection 10 mg, 10 mg, Intravenous, Q4H PRN, Justina Keene PA-C, 10 mg at 01/29/18 0407    LORazepam (ATIVAN) tablet 0 5 mg, 0 5 mg, Oral, Q8H PRN, Adore Montes MD    methylPREDNISolone sodium succinate (Solu-MEDROL) 1,000 mg in sodium chloride 0 9 % 250 mL IVPB, 1,000 mg, Intravenous, Daily, DEISI Lucio, Stopped at 01/29/18 1400    niCARdipine (CARDENE) 25 mg (STANDARD CONCENTRATION) in sodium chloride 0 9% 250 mL, 1-15 mg/hr, Intravenous, Titrated, Felice Darnell PA-C, Stopped at 01/26/18 0837    ondansetron (ZOFRAN) injection 4 mg, 4 mg, Intravenous, Q6H PRN, Justina Keene PA-C, 4 mg at 01/29/18 0421    pantoprazole (PROTONIX) EC tablet 40 mg, 40 mg, Oral, Early Morning, DEISI Lucio, 40 mg at 01/30/18 0612    promethazine (PHENERGAN) injection 25 mg, 25 mg, Intravenous, Q6H PRN, Adore Montes MD, 25 mg at 01/29/18 0644    Laboratory Results:    Results from last 7 days  Lab Units 01/29/18  0633 01/29/18  0625 01/28/18  0420 01/27/18  2007 01/27/18  0432 01/26/18  1418 01/26/18  0432 01/25/18  0445 01/24/18  1534 01/24/18  0508   WBC Thousand/uL  --  18 65* 17 30*  16 63*  --  6 01  --  7 22  --   --  8 46   HEMOGLOBIN g/dL  --  9 1* 8 5*  8 5*  --  8 6*  --  8 3*  --   --  8 9*   HEMATOCRIT %  --  26 5* 24 7*  24 6*  --  25 4*  --  24 1*  --   --  26 0*   PLATELETS Thousands/uL  --  298 272  273  --  228  --  217  --   --  222   SODIUM mmol/L 139 139 137  --  137 136 137 135* 138 140   POTASSIUM mmol/L 4 2 4 2 4 3  --  5 0 5 0 4 8 4 8 5 0 4 4   CHLORIDE mmol/L 106 107 104  --  105 105 105 105 106 108   CO2 mmol/L 21 21 20*  --  21 19* 20* 20* 21 20* BUN mg/dL 71* 71* 70*  --  60* 62* 59* 52* 47* 47*   CREATININE mg/dL 5 22* 5 24* 6 63*  --  7 38* 8 20* 8 33* 7 87* 7 36* 6 80*   CALCIUM mg/dL 8 6 8 5 8 7  --  8 4 8 0* 8 1* 7 8* 7 7* 8 1*   MAGNESIUM mg/dL  --   --   --   --   --   --   --  2 1  --   --    PHOSPHORUS mg/dL  --  5 7*  --   --   --   --   --  6 9*  --  6 5*   TOTAL PROTEIN g/dL  --   --   --   --   --   --   --   --  5 1* 5 0*   GLUCOSE RANDOM mg/dL 80 80 165* 560* 179* 325* 137 179* 109 72     Previous work up:

## 2018-01-31 LAB
ALB CSF/SERPL: 6 {RATIO} (ref 0–8)
ALBUMIN CSF-MCNC: 15 MG/DL (ref 11–48)
ALBUMIN SERPL BCP-MCNC: 2.7 G/DL (ref 3.5–5)
ALBUMIN SERPL-MCNC: 2.6 G/DL (ref 3.5–5.5)
ANION GAP SERPL CALCULATED.3IONS-SCNC: 10 MMOL/L (ref 4–13)
ANION GAP SERPL CALCULATED.3IONS-SCNC: 9 MMOL/L (ref 4–13)
APTT PPP: 29 SECONDS (ref 23–35)
BASOPHILS # BLD AUTO: 0 THOUSANDS/ΜL (ref 0–0.1)
BASOPHILS NFR BLD AUTO: 0 % (ref 0–1)
BUN SERPL-MCNC: 73 MG/DL (ref 5–25)
BUN SERPL-MCNC: 75 MG/DL (ref 5–25)
CA-I BLD-SCNC: 1.16 MMOL/L (ref 1.12–1.32)
CALCIUM SERPL-MCNC: 8.3 MG/DL (ref 8.3–10.1)
CALCIUM SERPL-MCNC: 8.4 MG/DL (ref 8.3–10.1)
CHLORIDE SERPL-SCNC: 106 MMOL/L (ref 100–108)
CHLORIDE SERPL-SCNC: 108 MMOL/L (ref 100–108)
CO2 SERPL-SCNC: 22 MMOL/L (ref 21–32)
CO2 SERPL-SCNC: 23 MMOL/L (ref 21–32)
CREAT SERPL-MCNC: 4.42 MG/DL (ref 0.6–1.3)
CREAT SERPL-MCNC: 4.43 MG/DL (ref 0.6–1.3)
EOSINOPHIL # BLD AUTO: 0 THOUSAND/ΜL (ref 0–0.61)
EOSINOPHIL NFR BLD AUTO: 0 % (ref 0–6)
ERYTHROCYTE [DISTWIDTH] IN BLOOD BY AUTOMATED COUNT: 13 % (ref 11.6–15.1)
FIBRINOGEN PPP-MCNC: 198 MG/DL (ref 227–495)
FOLATE SERPL-MCNC: 4 NG/ML (ref 3.1–17.5)
GFR SERPL CREATININE-BSD FRML MDRD: 16 ML/MIN/1.73SQ M
GFR SERPL CREATININE-BSD FRML MDRD: 16 ML/MIN/1.73SQ M
GLUCOSE SERPL-MCNC: 105 MG/DL (ref 65–140)
GLUCOSE SERPL-MCNC: 114 MG/DL (ref 65–140)
GLUCOSE SERPL-MCNC: 120 MG/DL (ref 65–140)
GLUCOSE SERPL-MCNC: 130 MG/DL (ref 65–140)
GLUCOSE SERPL-MCNC: 151 MG/DL (ref 65–140)
GLUCOSE SERPL-MCNC: 153 MG/DL (ref 65–140)
GLUCOSE SERPL-MCNC: 153 MG/DL (ref 65–140)
GLUCOSE SERPL-MCNC: 157 MG/DL (ref 65–140)
GLUCOSE SERPL-MCNC: 178 MG/DL (ref 65–140)
GLUCOSE SERPL-MCNC: 198 MG/DL (ref 65–140)
GLUCOSE SERPL-MCNC: 220 MG/DL (ref 65–140)
GLUCOSE SERPL-MCNC: 236 MG/DL (ref 65–140)
GLUCOSE SERPL-MCNC: 267 MG/DL (ref 65–140)
GLUCOSE SERPL-MCNC: 61 MG/DL (ref 65–140)
HCT VFR BLD AUTO: 26.3 % (ref 36.5–49.3)
HGB BLD-MCNC: 9 G/DL (ref 12–17)
IGG CSF-MCNC: 1.1 MG/DL (ref 0–8.6)
IGG SERPL-MCNC: 313 MG/DL (ref 700–1600)
IGG SYNTH RATE SER+CSF CALC-MRATE: 0.3 MG/DAY
IGG/ALB CLEAR SER+CSF-RTO: 0.6 (ref 0–0.7)
IGG/ALB CSF: 0.07 {RATIO} (ref 0–0.25)
INR PPP: 1.04 (ref 0.86–1.16)
LYMPHOCYTES # BLD AUTO: 1.49 THOUSANDS/ΜL (ref 0.6–4.47)
LYMPHOCYTES NFR BLD AUTO: 12 % (ref 14–44)
MBP CSF-MCNC: 12.2 NG/ML (ref 0–1.2)
MCH RBC QN AUTO: 30.7 PG (ref 26.8–34.3)
MCHC RBC AUTO-ENTMCNC: 34.2 G/DL (ref 31.4–37.4)
MCV RBC AUTO: 90 FL (ref 82–98)
MONOCYTES # BLD AUTO: 0.67 THOUSAND/ΜL (ref 0.17–1.22)
MONOCYTES NFR BLD AUTO: 5 % (ref 4–12)
NEUTROPHILS # BLD AUTO: 10.56 THOUSANDS/ΜL (ref 1.85–7.62)
NEUTS SEG NFR BLD AUTO: 83 % (ref 43–75)
OLIGOCLONAL BANDS.IT SER+CSF QL: ABNORMAL
PHOSPHATE SERPL-MCNC: 5.2 MG/DL (ref 2.7–4.5)
PLATELET # BLD AUTO: 308 THOUSANDS/UL (ref 149–390)
PMV BLD AUTO: 11.2 FL (ref 8.9–12.7)
POTASSIUM SERPL-SCNC: 4.2 MMOL/L (ref 3.5–5.3)
POTASSIUM SERPL-SCNC: 4.3 MMOL/L (ref 3.5–5.3)
PROTHROMBIN TIME: 13.9 SECONDS (ref 12.1–14.4)
RBC # BLD AUTO: 2.93 MILLION/UL (ref 3.88–5.62)
SODIUM SERPL-SCNC: 138 MMOL/L (ref 136–145)
SODIUM SERPL-SCNC: 140 MMOL/L (ref 136–145)
WBC # BLD AUTO: 12.72 THOUSAND/UL (ref 4.31–10.16)

## 2018-01-31 PROCEDURE — 85384 FIBRINOGEN ACTIVITY: CPT | Performed by: INTERNAL MEDICINE

## 2018-01-31 PROCEDURE — 99232 SBSQ HOSP IP/OBS MODERATE 35: CPT | Performed by: NURSE PRACTITIONER

## 2018-01-31 PROCEDURE — 82948 REAGENT STRIP/BLOOD GLUCOSE: CPT

## 2018-01-31 PROCEDURE — 80069 RENAL FUNCTION PANEL: CPT | Performed by: INTERNAL MEDICINE

## 2018-01-31 PROCEDURE — 85025 COMPLETE CBC W/AUTO DIFF WBC: CPT | Performed by: PHYSICIAN ASSISTANT

## 2018-01-31 PROCEDURE — 99232 SBSQ HOSP IP/OBS MODERATE 35: CPT | Performed by: INTERNAL MEDICINE

## 2018-01-31 PROCEDURE — 80048 BASIC METABOLIC PNL TOTAL CA: CPT | Performed by: PHYSICIAN ASSISTANT

## 2018-01-31 PROCEDURE — 85730 THROMBOPLASTIN TIME PARTIAL: CPT | Performed by: INTERNAL MEDICINE

## 2018-01-31 PROCEDURE — 83918 ORGANIC ACIDS TOTAL QUANT: CPT | Performed by: INTERNAL MEDICINE

## 2018-01-31 PROCEDURE — 82746 ASSAY OF FOLIC ACID SERUM: CPT | Performed by: INTERNAL MEDICINE

## 2018-01-31 PROCEDURE — 82330 ASSAY OF CALCIUM: CPT | Performed by: INTERNAL MEDICINE

## 2018-01-31 PROCEDURE — 84207 ASSAY OF VITAMIN B-6: CPT | Performed by: INTERNAL MEDICINE

## 2018-01-31 PROCEDURE — 85610 PROTHROMBIN TIME: CPT | Performed by: INTERNAL MEDICINE

## 2018-01-31 RX ORDER — FOLIC ACID 1 MG/1
1 TABLET ORAL DAILY
Status: DISCONTINUED | OUTPATIENT
Start: 2018-01-31 | End: 2018-02-05 | Stop reason: HOSPADM

## 2018-01-31 RX ORDER — POLYETHYLENE GLYCOL 3350 17 G/17G
17 POWDER, FOR SOLUTION ORAL DAILY
Status: DISCONTINUED | OUTPATIENT
Start: 2018-01-31 | End: 2018-02-05 | Stop reason: HOSPADM

## 2018-01-31 RX ORDER — INSULIN GLARGINE 100 [IU]/ML
22 INJECTION, SOLUTION SUBCUTANEOUS
Status: DISCONTINUED | OUTPATIENT
Start: 2018-01-31 | End: 2018-02-01

## 2018-01-31 RX ORDER — NIFEDIPINE 30 MG/1
30 TABLET, EXTENDED RELEASE ORAL 2 TIMES DAILY
Status: DISCONTINUED | OUTPATIENT
Start: 2018-01-31 | End: 2018-02-05 | Stop reason: HOSPADM

## 2018-01-31 RX ORDER — INSULIN GLARGINE 100 [IU]/ML
22 INJECTION, SOLUTION SUBCUTANEOUS
Status: DISCONTINUED | OUTPATIENT
Start: 2018-02-01 | End: 2018-02-01

## 2018-01-31 RX ADMIN — CALCIUM ACETATE 667 MG: 667 CAPSULE ORAL at 12:06

## 2018-01-31 RX ADMIN — HEPARIN SODIUM 5000 UNITS: 5000 INJECTION, SOLUTION INTRAVENOUS; SUBCUTANEOUS at 14:14

## 2018-01-31 RX ADMIN — INSULIN GLARGINE 22 UNITS: 100 INJECTION, SOLUTION SUBCUTANEOUS at 22:00

## 2018-01-31 RX ADMIN — ATORVASTATIN CALCIUM 40 MG: 40 TABLET, FILM COATED ORAL at 17:03

## 2018-01-31 RX ADMIN — CALCIUM ACETATE 667 MG: 667 CAPSULE ORAL at 07:31

## 2018-01-31 RX ADMIN — HYDRALAZINE HYDROCHLORIDE 100 MG: 25 TABLET, FILM COATED ORAL at 17:02

## 2018-01-31 RX ADMIN — HYDRALAZINE HYDROCHLORIDE 10 MG: 20 INJECTION INTRAMUSCULAR; INTRAVENOUS at 04:13

## 2018-01-31 RX ADMIN — FOLIC ACID 1 MG: 1 TABLET ORAL at 14:16

## 2018-01-31 RX ADMIN — HEPARIN SODIUM 5000 UNITS: 5000 INJECTION, SOLUTION INTRAVENOUS; SUBCUTANEOUS at 22:01

## 2018-01-31 RX ADMIN — DOCUSATE SODIUM 100 MG: 100 CAPSULE, LIQUID FILLED ORAL at 08:40

## 2018-01-31 RX ADMIN — HEPARIN SODIUM 5000 UNITS: 5000 INJECTION, SOLUTION INTRAVENOUS; SUBCUTANEOUS at 05:58

## 2018-01-31 RX ADMIN — CALCIUM ACETATE 667 MG: 667 CAPSULE ORAL at 16:59

## 2018-01-31 RX ADMIN — PANTOPRAZOLE SODIUM 40 MG: 40 TABLET, DELAYED RELEASE ORAL at 05:58

## 2018-01-31 RX ADMIN — ALBUMIN HUMAN 175 G: 0.05 INJECTION, SOLUTION INTRAVENOUS at 08:41

## 2018-01-31 RX ADMIN — CARVEDILOL 25 MG: 12.5 TABLET, FILM COATED ORAL at 07:31

## 2018-01-31 RX ADMIN — NIFEDIPINE 30 MG: 30 TABLET, FILM COATED, EXTENDED RELEASE ORAL at 17:03

## 2018-01-31 RX ADMIN — HYDRALAZINE HYDROCHLORIDE 100 MG: 25 TABLET, FILM COATED ORAL at 22:00

## 2018-01-31 RX ADMIN — CARVEDILOL 25 MG: 12.5 TABLET, FILM COATED ORAL at 16:59

## 2018-01-31 RX ADMIN — POLYETHYLENE GLYCOL 3350 17 G: 17 POWDER, FOR SOLUTION ORAL at 12:06

## 2018-01-31 RX ADMIN — NIFEDIPINE 30 MG: 30 TABLET, FILM COATED, EXTENDED RELEASE ORAL at 08:40

## 2018-01-31 RX ADMIN — HYDRALAZINE HYDROCHLORIDE 100 MG: 25 TABLET, FILM COATED ORAL at 08:40

## 2018-01-31 RX ADMIN — CLOPIDOGREL BISULFATE 75 MG: 75 TABLET ORAL at 08:40

## 2018-01-31 NOTE — PROGRESS NOTES
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   Active Problems:    Cerebrovascular accident (CVA) of left pontine structure ([facility] )    Acute renal failure superimposed on stage 3 chronic kidney disease ([facility] )    Hypertensive urgency    Type 1 diabetes mellitus with diabetic nephropathy, with long-term current use of insulin ([facility] )    History of lacunar cerebrovascular accident (CVA)    Hyperphosphatemia    Vitamin D deficiency    Azotemia      Plan:    1  Subacute CVA with enhancement on brain MRI, rule out demyelinating process, history of previous CVA,  S/p  LP follow on MS panel, started on plasmapheresis by Neurology  continue Plavix and statin  Evaluated by Hematology due to hyperhomocystinemia/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 MTHFR and heterozygous prothrombin gene mutation  No definite indication for therapeutic anticoagulation at this time,  follow on B6 and medthylmalonic acid level  2  LLUVIA /CKD stage III with proteinuria, secondary to ATN with contrast induced nephropathy,  negative renal ultrasound, negative SPEP,  UPEP  nephrology is following  3  Hypertensive urgency, not well controlled,  nephrology is following, currently on Coreg, nifedipine, and hydralazine  4  Diabetes mellitus type 1 with A1c 6 4, endocrine is following, on insulin drip  5  Vitamin-D deficiency, on vitamin-D supplement  6  Leukocytosis, improving likely secondary to steroid, monitor  7  Tobacco smoking, refused nicotine patch       VTE Pharmacologic Prophylaxis:   Pharmacologic: Heparin  Mechanical VTE Prophylaxis in Place: Yes    Patient Centered Rounds: I have performed bedside rounds with nursing staff today      Discussions with Specialists or Other Care Team Provider: Education and Discussions with Family / Patient:  Patient and his mother at bedside    Time Spent for Care: 30 minutes  More than 50% of total time spent on counseling and coordination of care as described above  Current Length of Stay: 9 day(s)    Current Patient Status: Inpatient   Certification Statement: The patient will continue to require additional inpatient hospital stay due to Management of acute CVA    Discharge Plan / Estimated Discharge Date: not ready yet    Code Status: Level 1 - Full Code      Subjective:   Patient seen and examined  Comfortable in bed  Current leg and plasmapheresis  No chest pain or shortness of breath    Objective:     Vitals:   Temp (24hrs), Av 1 °F (36 7 °C), Min:97 8 °F (36 6 °C), Max:98 7 °F (37 1 °C)    HR:  [66-80] 72  Resp:  [16-18] 18  BP: (158-208)/() 170/90  SpO2:  [97 %-99 %] 99 %  Body mass index is 32 41 kg/m²  Input and Output Summary (last 24 hours):        Intake/Output Summary (Last 24 hours) at 18 1045  Last data filed at 18 0841   Gross per 24 hour   Intake            218 1 ml   Output             2420 ml   Net          -2201 9 ml       Physical Exam:     Physical Exam  Patient is awake alert in no acute distress  Lung clear to auscultation bilateral  Heart positive S1-S2 no murmur  Abdomen soft nontender positive bowel sounds  Lower extremity 1+ pitting edema    Additional Data:     Labs:      Results from last 7 days  Lab Units 18  0423   WBC Thousand/uL 12 72*   HEMOGLOBIN g/dL 9 0*   HEMATOCRIT % 26 3*   PLATELETS Thousands/uL 308   NEUTROS PCT % 83*   LYMPHS PCT % 12*   MONOS PCT % 5   EOS PCT % 0       Results from last 7 days  Lab Units 18  0425  18  1534   SODIUM mmol/L 140  < > 138   POTASSIUM mmol/L 4 3  < > 5 0   CHLORIDE mmol/L 108  < > 106   CO2 mmol/L 23  < > 21   BUN mg/dL 75*  < > 47*   CREATININE mg/dL 4 43*  < > 7 36*   CALCIUM mg/dL 8 4  < > 7 7*   TOTAL PROTEIN g/dL  --   --  5 1*   BILIRUBIN TOTAL mg/dL  --   --  0 20   ALK PHOS U/L  --   --  85   ALT U/L  --   --  15   AST U/L  --   --  16   GLUCOSE RANDOM mg/dL 153*  < > 109   < > = values in this interval not displayed  Results from last 7 days  Lab Units 01/31/18  0417   INR  1 04       * I Have Reviewed All Lab Data Listed Above  * Additional Pertinent Lab Tests Reviewed:  [facility] Admission Reviewed    Imaging:    Imaging Reports Reviewed Today Include:   Imaging Personally Reviewed by Myself Includes:      Recent Cultures (last 7 days):       Results from last 7 days  Lab Units 01/26/18  1037   GRAM STAIN RESULT  No No polys seen  No No bacteria seen       Last 24 Hours Medication List:     Current Facility-Administered Medications:  acetaminophen 650 mg Oral Q4H PRN Justina Keene PA-C    albumin human 175 g Intravenous Every Other Day DEISI Lucio    atorvastatin 40 mg Oral QPM Justina Keene PA-C    calcium acetate 667 mg Oral TID With SAV Winn    carvedilol 25 mg Oral BID With Meals DEISI Blake    clopidogrel 75 mg Oral Daily Caio Pathak MD    diphenhydrAMINE 25 mg Oral HS PRN Caio Pathak MD    docusate sodium 100 mg Oral BID Justina Keene PA-C    ergocalciferol 50,000 Units Oral Weekly Erika Green PA-C    heparin (porcine) 5,000 Units Subcutaneous Q8H [facility] Caio Pathak MD    hydrALAZINE 10 mg Intravenous Q6H PRN Severiano Deal, PA-C    hydrALAZINE 100 mg Oral TID Kylah Garrett MD    insulin regular (HumuLIN R,NovoLIN R) infusion 0 3-21 Units/hr Intravenous Titrated Carey Marsh DO Last Rate: 6 Units/hr (01/31/18 1010)   labetalol 10 mg Intravenous Q4H PRN Justina Keene PA-C    LORazepam 0 5 mg Oral Q8H PRN Caio Pathak MD    niCARdipine 1-15 mg/hr Intravenous Titrated Pasha Palacio PA-C Last Rate: Stopped (01/26/18 5925)   NIFEdipine 30 mg Oral Daily Kylah Garrett MD    ondansetron 4 mg Intravenous Q6H PRN Justina Keene PA-C    pantoprazole 40 mg Oral Early Morning Krysten DEISI Chance    promethazine 25 mg Intravenous Q6H PRN Golden MD Mitesh         Today, Patient Was Seen By: Aristeo Wagner DO    ** Please Note: This note has been constructed using a voice recognition system   **

## 2018-01-31 NOTE — PROGRESS NOTES
Progress Note - Loreto Mckeon 27 y o  male MRN: [id number]    Unit/Bed#:  Encounter: [id number]      CC: diabetes f/u    Subjective: Sujata Slade is a 27y o  year old male with type 1 diabetes  Feels well  No complaints  No hypoglycemia  He is requiring 12 units of insulin per hour, after breakfast  Overnight he was requiring 2 units/hour  He has finished IV steroids yesterday  Objective:     Vitals: Blood pressure (!) 171/82, pulse 83, temperature 97 9 °F (36 6 °C), temperature source Oral, resp  rate 18, height 5' 10" (1 778 m), weight 105 kg (232 lb 5 8 oz), SpO2 99 %  ,Body mass index is 32 41 kg/m²  Intake/Output Summary (Last 24 hours) at 01/31/18 1637  Last data filed at 01/31/18 1400   Gross per 24 hour   Intake           105 71 ml   Output             2425 ml   Net         -2319 29 ml       Physical Exam:  General Appearance: awake, appears stated age and cooperative  Head: Normocephalic, without obvious abnormality, atraumatic  Extremities: moves all extremities  Skin: Skin color and temperature normal    Pulm: no labored breathing    Lab, Imaging and other studies: I have personally reviewed pertinent reports  POC Glucose (mg/dl)   Date Value   01/31/2018 220 (H)   01/31/2018 267 (H)   01/31/2018 236 (H)   01/31/2018 198 (H)   01/31/2018 120   01/31/2018 153 (H)   01/31/2018 130   01/31/2018 157 (H)   01/30/2018 105   01/30/2018 141 (H)     Lab Results   Component Value Date    CREATININE 4 43 (H) 01/31/2018       Assessment:  Type 1 diabetes with hyperglycemia, induced by IV steroids, IV steroids are stopped now, blood sugars expected to improve    2   CKD    Plan:  -will switch patient to subcu insulin regimen tonight  -start Lantus 22 units at bedtime, stop insulin drip 2 hours later  -start Lantus 22 units in the morning   -start Humalog 12 units with meals+ Humalog scale with meals with algorithm 4  -start Humalog scale at bedtime  -continue to monitor blood sugars and make changes accordingly  -please inform Endocrinology if he gets started back on steroid, to adjust his insulin regimen    2  CKD- currently managed by Nephrology, creatinine is in stable range  Portions of the record may have been created with voice recognition software

## 2018-01-31 NOTE — PROGRESS NOTES
NEPHROLOGY PROGRESS NOTE   Tomás Mckeon 27 y o  male MRN: [id number]  Unit/Bed#:  Encounter: [id number]  Reason for Consult:  Acute kidney injury, CKD     ASSESSMENT and PLAN:    The patient is a 57-year-old male with a history of type 1 diabetes mellitus, hypertension, CVA, smoker, hyperlipidemia and chronic kidney disease who was lost to renal follow-up  Henna Plaza presented on 01/22/2018 with a change in vision  Patient was found to have hypertensive urgency  Additionally MRI showed punctate left lateral pontine infarct along with concerns for demyelinating disorder   Creatinine peaked at 8  35   Nephrology following for management of acute on chronic CKD and hypertension   -plasma exchange initiated on 01/29/2018  Plan is for every other day times 10 days     1  Acute kidney injury:  Nonoliguric  Renal function slowly improving   Creatinine peaked at 8 33 -->5 22 on 01/29--->4 77 on 1/30-->4 43 on 1/31  · LLUVIA due to ATN due to uncontrolled hypertension, contrast exposure in the setting of altered renal hemodynamics due to ACE-inhibitor, NSAIDs also contribute  · Renal ultrasound right kidney 12 6 cm, left kidney 12 4 cm, normal appearing kidneys with no hydronephrosis noted  · Significant proteinuria >9g   (Consideration for biopsy but will likely not change the course of management at this point; contraindicated due to uncontrolled hypertension-will continue to evaluate)  · Proteinuria:  SPEP, UPEP negative for monoclonal gammopathy  · ANCA negative, CARMEN negative, hepatitis-C antibody negative  2  Chronic kidney disease:  Baseline creatinine unknown   Last creatinine 2 3 in 2017     · Etiology of CKD-diabetic nephropathy, hypertensive nephrosclerosis   3  Hypertensive emergency:  · On Coreg 25 mg twice a day, hydralazine 100 mg 3 times a day and nifedipine 30 mg daily  Blood pressure variable    Appeared to be improving yesterday and meds were tapered but then blood pressure again escalated and currently remains high  Will likely need to increase nifedipine to 60 mg daily  · Continue p r n  hydralazine and labetalol  · May need to give a dose of nifedipine later today if blood pressure remains elevated  5  Vision disturbance:  CNS demyelination suspected vs chronic ischemic disease   Patient on high-dose steroids without improvement; for plasma exchange starting today   Neurology following  · For plasmapheresis every other day for 10 days  · Status post lumbar puncture  So far workup negative  · For steroid prophylaxis avoid Bactrim due to LLUVIA; atovaquone preferred  6  CVA: Acute left pontine  History of CVA 1   Neurology following  7  Hyperhomocystinemia, C6&&T homozygosity, heterozygous prothrombin gene mutation:  Hematology/oncology following  8  Hyperphosphatemia:  On PhosLo, renal diet  Phosphorus level declining currently 5 2  9  Diabetes mellitus:  Per primary team  10  Anemia:  Iron saturation 28%, ferritin 222  11  Vitamin-D deficiency:  Vitamin-D level 10 9-on ergocalciferol weekly x 12    SUMMARY OF RECOMMENDATIONS:  · Continue to closely monitor renal function  · If blood pressure remains elevated will give a 2nd dose of nifedipine later today  · Continue p r n  Medications  · Will touch base with Neurology regarding recommendations for blood pressure control    SUBJECTIVE / INTERVAL HISTORY:  No specific complaints  Appetite intact    Difficulty sleeping due to frequent care issues    OBJECTIVE:  Current Weight: Weight - Scale: 105 kg (232 lb 5 8 oz)  Vitals:    01/31/18 0600 01/31/18 0700 01/31/18 0805 01/31/18 0850   BP:  (!) 203/99 (!) 200/98 (!) 208/105   BP Location:  Right arm     Pulse:  76 66 68   Resp:  18     Temp:  97 8 °F (36 6 °C)     TempSrc:  Oral     SpO2:       Weight: 105 kg (232 lb 5 8 oz)      Height:           Intake/Output Summary (Last 24 hours) at 01/31/18 0938  Last data filed at 01/31/18 0841   Gross per 24 hour   Intake           707 78 ml   Output 2420 ml   Net         -1712 22 ml     General:  No acute distress  Skin:  Warm and dry, no rash  Eyes:  Sclera clear, rightward gaze  ENT:  Oropharynx moist  Neck:  Supple no JVD  Chest:  Clear bilaterally no wheezes, rhonchi, rales  CVS:  Regular rhythm, no murmur, rub, gallop appreciated  Abdomen:  Soft, nondistended, nontender, bowel sounds present  Extremities:  Mild edema  :  Voiding  Neuro:  Alert and oriented  Psych:  Appropriate    Medications:    Current Facility-Administered Medications:     acetaminophen (TYLENOL) tablet 650 mg, 650 mg, Oral, Q4H PRN, Justina Keene PA-C    albumin human (FLEXBUMIN) 5 % injection 175 g, 175 g, Intravenous, Every Other Day, DEISI Lucio, 175 g at 01/31/18 0841    atorvastatin (LIPITOR) tablet 40 mg, 40 mg, Oral, QPM, Justina Keene PA-C, 40 mg at 01/30/18 1758    calcium acetate (PHOSLO) capsule 667 mg, 667 mg, Oral, TID With Meals, Sagrario Saldivar PA-C, 667 mg at 01/31/18 0731    carvedilol (COREG) tablet 25 mg, 25 mg, Oral, BID With Meals, DEISI Bills, 25 mg at 01/31/18 0731    clopidogrel (PLAVIX) tablet 75 mg, 75 mg, Oral, Daily, Madeline Cruz MD, 75 mg at 01/31/18 0840    diphenhydrAMINE (BENADRYL) tablet 25 mg, 25 mg, Oral, HS PRN, Madeline Cruz MD    docusate sodium (COLACE) capsule 100 mg, 100 mg, Oral, BID, Justina Keene PA-C, 100 mg at 01/31/18 0840    ergocalciferol (VITAMIN D2) capsule 50,000 Units, 50,000 Units, Oral, Weekly, Erika Green PA-C, 50,000 Units at 01/26/18 0800    heparin (porcine) subcutaneous injection 5,000 Units, 5,000 Units, Subcutaneous, Q8H [facility], Madeline Cruz MD, 5,000 Units at 01/31/18 0558    hydrALAZINE (APRESOLINE) injection 10 mg, 10 mg, Intravenous, Q6H PRN, Deya Crandall PA-C, 10 mg at 01/31/18 0413    hydrALAZINE (APRESOLINE) tablet 100 mg, 100 mg, Oral, TID, Bertha Ruiz MD, 100 mg at 01/31/18 0840    insulin regular (HumuLIN R,NovoLIN R) 1 Units/mL in sodium chloride 0 9 % 100 mL infusion, 0 3-21 Units/hr, Intravenous, Titrated, Darol Ran, DO, Last Rate: 2 mL/hr at 01/31/18 0809, 2 Units/hr at 01/31/18 0809    labetalol (NORMODYNE) injection 10 mg, 10 mg, Intravenous, Q4H PRN, Justina Keene PA-C, 10 mg at 01/29/18 0407    LORazepam (ATIVAN) tablet 0 5 mg, 0 5 mg, Oral, Q8H PRN, Thanh Bingham MD    niCARdipine (CARDENE) 25 mg (STANDARD CONCENTRATION) in sodium chloride 0 9% 250 mL, 1-15 mg/hr, Intravenous, Titrated, Petra Leos PA-C, Stopped at 01/26/18 0753    NIFEdipine (PROCARDIA XL) 24 hr tablet 30 mg, 30 mg, Oral, Daily, Maral Valentine MD, 30 mg at 01/31/18 0840    ondansetron (ZOFRAN) injection 4 mg, 4 mg, Intravenous, Q6H PRN, Justina Keene PA-C, 4 mg at 01/29/18 0421    pantoprazole (PROTONIX) EC tablet 40 mg, 40 mg, Oral, Early Morning, DEISI Lucio, 40 mg at 01/31/18 0558    promethazine (PHENERGAN) injection 25 mg, 25 mg, Intravenous, Q6H PRN, Thanh Bingham MD, 25 mg at 01/29/18 0644    Laboratory Results:    Results from last 7 days  Lab Units 01/31/18  0425 01/31/18  0423 01/31/18  0417 01/30/18  1001 01/29/18  4130 01/29/18  0625 01/28/18  0420 01/27/18 2007 01/27/18  0432  01/26/18  0432 01/25/18  0445 01/24/18  1534   WBC Thousand/uL  --  12 72*  --  14 93*  --  18 65* 17 30*  16 63*  --  6 01  --  7 22  --   --    HEMOGLOBIN g/dL  --  9 0*  --  9 5*  --  9 1* 8 5*  8 5*  --  8 6*  --  8 3*  --   --    HEMATOCRIT %  --  26 3*  --  28 3*  --  26 5* 24 7*  24 6*  --  25 4*  --  24 1*  --   --    PLATELETS Thousands/uL  --  308  --  329  --  298 272  273  --  228  --  217  --   --    SODIUM mmol/L 140  --  138 137 139 139 137  --  137  < > 137 135* 138   POTASSIUM mmol/L 4 3  --  4 2 4 4 4 2 4 2 4 3  --  5 0  < > 4 8 4 8 5 0   CHLORIDE mmol/L 108  --  106 107 106 107 104  --  105  < > 105 105 106   CO2 mmol/L 23  --  22 21 21 21 20*  --  21  < > 20* 20* 21   BUN mg/dL 75*  --  73* 69* 71* 71* 70*  --  60*  < > 59* 52* 47*   CREATININE mg/dL 4 43*  --  4 42* 4 77* 5 22* 5 24* 6 63*  --  7 38*  < > 8 33* 7 87* 7 36*   CALCIUM mg/dL 8 4  --  8 3 8 2* 8 6 8 5 8 7  --  8 4  < > 8 1* 7 8* 7 7*   MAGNESIUM mg/dL  --   --   --   --   --   --   --   --   --   --   --  2 1  --    PHOSPHORUS mg/dL  --   --  5 2*  --   --  5 7*  --   --   --   --   --  6 9*  --    TOTAL PROTEIN g/dL  --   --   --   --   --   --   --   --   --   --   --   --  5 1*   GLUCOSE RANDOM mg/dL 153*  --  151* 278* 80 80 165* 560* 179*  < > 137 179* 109   < > = values in this interval not displayed    Previous work up:

## 2018-02-01 LAB
ANION GAP SERPL CALCULATED.3IONS-SCNC: 8 MMOL/L (ref 4–13)
BASOPHILS # BLD AUTO: 0.01 THOUSANDS/ΜL (ref 0–0.1)
BASOPHILS NFR BLD AUTO: 0 % (ref 0–1)
BUN SERPL-MCNC: 67 MG/DL (ref 5–25)
CALCIUM SERPL-MCNC: 7.8 MG/DL (ref 8.3–10.1)
CHLORIDE SERPL-SCNC: 109 MMOL/L (ref 100–108)
CO2 SERPL-SCNC: 25 MMOL/L (ref 21–32)
CREAT SERPL-MCNC: 3.76 MG/DL (ref 0.6–1.3)
EOSINOPHIL # BLD AUTO: 0.34 THOUSAND/ΜL (ref 0–0.61)
EOSINOPHIL NFR BLD AUTO: 4 % (ref 0–6)
ERYTHROCYTE [DISTWIDTH] IN BLOOD BY AUTOMATED COUNT: 13.1 % (ref 11.6–15.1)
GFR SERPL CREATININE-BSD FRML MDRD: 20 ML/MIN/1.73SQ M
GLUCOSE SERPL-MCNC: 107 MG/DL (ref 65–140)
GLUCOSE SERPL-MCNC: 107 MG/DL (ref 65–140)
GLUCOSE SERPL-MCNC: 113 MG/DL (ref 65–140)
GLUCOSE SERPL-MCNC: 158 MG/DL (ref 65–140)
GLUCOSE SERPL-MCNC: 177 MG/DL (ref 65–140)
GLUCOSE SERPL-MCNC: 198 MG/DL (ref 65–140)
GLUCOSE SERPL-MCNC: 216 MG/DL (ref 65–140)
GLUCOSE SERPL-MCNC: 47 MG/DL (ref 65–140)
GLUCOSE SERPL-MCNC: 59 MG/DL (ref 65–140)
GLUCOSE SERPL-MCNC: 85 MG/DL (ref 65–140)
GLUCOSE SERPL-MCNC: 92 MG/DL (ref 65–140)
HCT VFR BLD AUTO: 26.7 % (ref 36.5–49.3)
HGB BLD-MCNC: 9.2 G/DL (ref 12–17)
LYMPHOCYTES # BLD AUTO: 2.75 THOUSANDS/ΜL (ref 0.6–4.47)
LYMPHOCYTES NFR BLD AUTO: 28 % (ref 14–44)
MCH RBC QN AUTO: 31.1 PG (ref 26.8–34.3)
MCHC RBC AUTO-ENTMCNC: 34.5 G/DL (ref 31.4–37.4)
MCV RBC AUTO: 90 FL (ref 82–98)
MONOCYTES # BLD AUTO: 0.83 THOUSAND/ΜL (ref 0.17–1.22)
MONOCYTES NFR BLD AUTO: 9 % (ref 4–12)
NEUTROPHILS # BLD AUTO: 5.83 THOUSANDS/ΜL (ref 1.85–7.62)
NEUTS SEG NFR BLD AUTO: 59 % (ref 43–75)
PLATELET # BLD AUTO: 271 THOUSANDS/UL (ref 149–390)
PMV BLD AUTO: 10.5 FL (ref 8.9–12.7)
POTASSIUM SERPL-SCNC: 3.9 MMOL/L (ref 3.5–5.3)
RBC # BLD AUTO: 2.96 MILLION/UL (ref 3.88–5.62)
SODIUM SERPL-SCNC: 142 MMOL/L (ref 136–145)
WBC # BLD AUTO: 9.76 THOUSAND/UL (ref 4.31–10.16)

## 2018-02-01 PROCEDURE — 85025 COMPLETE CBC W/AUTO DIFF WBC: CPT | Performed by: INTERNAL MEDICINE

## 2018-02-01 PROCEDURE — 80048 BASIC METABOLIC PNL TOTAL CA: CPT | Performed by: INTERNAL MEDICINE

## 2018-02-01 PROCEDURE — 82948 REAGENT STRIP/BLOOD GLUCOSE: CPT

## 2018-02-01 PROCEDURE — 99232 SBSQ HOSP IP/OBS MODERATE 35: CPT | Performed by: INTERNAL MEDICINE

## 2018-02-01 PROCEDURE — 99232 SBSQ HOSP IP/OBS MODERATE 35: CPT | Performed by: NURSE PRACTITIONER

## 2018-02-01 RX ORDER — INSULIN GLARGINE 100 [IU]/ML
12 INJECTION, SOLUTION SUBCUTANEOUS
Status: DISCONTINUED | OUTPATIENT
Start: 2018-02-02 | End: 2018-02-02

## 2018-02-01 RX ORDER — DEXTROSE MONOHYDRATE 25 G/50ML
INJECTION, SOLUTION INTRAVENOUS
Status: COMPLETED
Start: 2018-02-01 | End: 2018-02-01

## 2018-02-01 RX ORDER — INSULIN GLARGINE 100 [IU]/ML
12 INJECTION, SOLUTION SUBCUTANEOUS
Status: DISCONTINUED | OUTPATIENT
Start: 2018-02-01 | End: 2018-02-02

## 2018-02-01 RX ORDER — INSULIN GLARGINE 100 [IU]/ML
10 INJECTION, SOLUTION SUBCUTANEOUS ONCE
Status: DISCONTINUED | OUTPATIENT
Start: 2018-02-01 | End: 2018-02-01

## 2018-02-01 RX ADMIN — CALCIUM ACETATE 667 MG: 667 CAPSULE ORAL at 15:39

## 2018-02-01 RX ADMIN — CARVEDILOL 25 MG: 12.5 TABLET, FILM COATED ORAL at 15:39

## 2018-02-01 RX ADMIN — CALCIUM ACETATE 667 MG: 667 CAPSULE ORAL at 08:13

## 2018-02-01 RX ADMIN — PANTOPRAZOLE SODIUM 40 MG: 40 TABLET, DELAYED RELEASE ORAL at 06:25

## 2018-02-01 RX ADMIN — INSULIN LISPRO 1 UNITS: 100 INJECTION, SOLUTION INTRAVENOUS; SUBCUTANEOUS at 21:23

## 2018-02-01 RX ADMIN — INSULIN GLARGINE 12 UNITS: 100 INJECTION, SOLUTION SUBCUTANEOUS at 21:20

## 2018-02-01 RX ADMIN — CARVEDILOL 25 MG: 12.5 TABLET, FILM COATED ORAL at 08:13

## 2018-02-01 RX ADMIN — HEPARIN SODIUM 5000 UNITS: 5000 INJECTION, SOLUTION INTRAVENOUS; SUBCUTANEOUS at 14:00

## 2018-02-01 RX ADMIN — HYDRALAZINE HYDROCHLORIDE 100 MG: 25 TABLET, FILM COATED ORAL at 08:13

## 2018-02-01 RX ADMIN — INSULIN LISPRO 2 UNITS: 100 INJECTION, SOLUTION INTRAVENOUS; SUBCUTANEOUS at 13:04

## 2018-02-01 RX ADMIN — DEXTROSE MONOHYDRATE 25 ML: 500 INJECTION PARENTERAL at 00:06

## 2018-02-01 RX ADMIN — NIFEDIPINE 30 MG: 30 TABLET, FILM COATED, EXTENDED RELEASE ORAL at 08:21

## 2018-02-01 RX ADMIN — HEPARIN SODIUM 5000 UNITS: 5000 INJECTION, SOLUTION INTRAVENOUS; SUBCUTANEOUS at 21:21

## 2018-02-01 RX ADMIN — HEPARIN SODIUM 5000 UNITS: 5000 INJECTION, SOLUTION INTRAVENOUS; SUBCUTANEOUS at 06:25

## 2018-02-01 RX ADMIN — FOLIC ACID 1 MG: 1 TABLET ORAL at 08:13

## 2018-02-01 RX ADMIN — INSULIN LISPRO 8 UNITS: 100 INJECTION, SOLUTION INTRAVENOUS; SUBCUTANEOUS at 17:15

## 2018-02-01 RX ADMIN — ATORVASTATIN CALCIUM 40 MG: 40 TABLET, FILM COATED ORAL at 17:17

## 2018-02-01 RX ADMIN — CLOPIDOGREL BISULFATE 75 MG: 75 TABLET ORAL at 08:13

## 2018-02-01 RX ADMIN — HYDRALAZINE HYDROCHLORIDE 100 MG: 25 TABLET, FILM COATED ORAL at 15:40

## 2018-02-01 RX ADMIN — HYDRALAZINE HYDROCHLORIDE 100 MG: 25 TABLET, FILM COATED ORAL at 20:25

## 2018-02-01 RX ADMIN — INSULIN LISPRO 2 UNITS: 100 INJECTION, SOLUTION INTRAVENOUS; SUBCUTANEOUS at 17:15

## 2018-02-01 RX ADMIN — DOCUSATE SODIUM 100 MG: 100 CAPSULE, LIQUID FILLED ORAL at 17:18

## 2018-02-01 RX ADMIN — DOCUSATE SODIUM 100 MG: 100 CAPSULE, LIQUID FILLED ORAL at 08:13

## 2018-02-01 RX ADMIN — CALCIUM ACETATE 667 MG: 667 CAPSULE ORAL at 11:48

## 2018-02-01 RX ADMIN — INSULIN LISPRO 8 UNITS: 100 INJECTION, SOLUTION INTRAVENOUS; SUBCUTANEOUS at 13:03

## 2018-02-01 RX ADMIN — DEXTROSE MONOHYDRATE 50 ML: 500 INJECTION PARENTERAL at 02:04

## 2018-02-01 RX ADMIN — NIFEDIPINE 30 MG: 30 TABLET, FILM COATED, EXTENDED RELEASE ORAL at 17:17

## 2018-02-01 NOTE — PROGRESS NOTES
NEPHROLOGY PROGRESS NOTE   Elvis Mckeon 27 y o  male MRN: [id number]  Unit/Bed#:  Encounter: [id number]  Reason for Consult:  Acute kidney injury, CKD, hypertension    ASSESSMENT and PLAN:    The patient is a 68-year-old male with a history of type 1 diabetes mellitus, hypertension, CVA, smoker, hyperlipidemia and chronic kidney disease who was lost to renal follow-up  Peng Castañeda presented on 01/22/2018 with a change in vision  Patient was found to have hypertensive urgency  Additionally MRI showed punctate left lateral pontine infarct along with concerns for demyelinating disorder  History of CVA 2015   Creatinine peaked at 8  35   Nephrology following for management of acute on chronic CKD and hypertension   -plasma exchange initiated on 01/29/2018  Plan is for QOD x 10 days     1  Acute kidney injury:  Nonoliguric  Renal function slowly improving   Creatinine peaked at 8 33 -->5 22 on 01/29--->4 77 on 1/30-->4 43 on 1/31-->3 76 on 2/1  · LLUVIA due to ATN due to uncontrolled hypertension, contrast exposure in the setting of altered renal hemodynamics due to ACE-inhibitor, NSAIDs also contribute  · Renal ultrasound right kidney 12 6 cm, left kidney 12 4 cm, normal appearing kidneys with no hydronephrosis noted  · Significant proteinuria >9g   (Consideration for biopsy but will likely not change the course of management at this point)  · Proteinuria:  SPEP, UPEP negative for monoclonal gammopathy  · ANCA negative, CARMEN negative, hepatitis-C antibody negative  2  Chronic kidney disease:  Baseline creatinine unknown   Last creatinine 2 3 in 2017     · Etiology of CKD-diabetic nephropathy, hypertensive nephrosclerosis   3  Hypertensive emergency:  · On Coreg 25 mg twice a day, hydralazine 100 mg 3 times a day and nifedipine 30 mg b i d  · Blood pressure improving  Continue to monitor  · Hold parameters reviewed on current medications    5  Vision disturbance:  CNS demyelination suspected vs chronic ischemic disease   Patient on high-dose steroids without improvement; for plasma exchange starting today   Neurology following  · For plasmapheresis every other day for 10 days  · Status post lumbar puncture  So far workup negative  6  CVA: Acute left pontine  History of CVA 1   Neurology following  7  Hyperhomocystinemia, C6&&T homozygosity, heterozygous prothrombin gene mutation:  Hematology/oncology following  8  Hyperphosphatemia:  On PhosLo, renal diet  Phosphorus level declining currently 5 2  · Continue to monitor  Will likely be able to discontinue PhosLo and phosphorus restriction as renal function improves  9  Diabetes mellitus:  Per primary team  10  Anemia:  Iron saturation 28%, ferritin 222  11  Vitamin-D deficiency:  Vitamin-D level 10 9-on ergocalciferol weekly x 12     SUMMARY OF RECOMMENDATIONS:  · Continue to closely monitor renal function  · No changes in medications at this time  · Avoid hypotension  Hold parameters reviewed on current medication  · Recheck urine protein creatinine ratio at steady state    SUBJECTIVE / INTERVAL HISTORY:  Feeling fine this morning  No complaints  No headache  No dizziness  No nausea vomiting diarrhea  Spoke with mother who was at bedside  OBJECTIVE:  Current Weight: Weight - Scale: 106 kg (234 lb 9 1 oz)  Vitals:    01/31/18 2300 02/01/18 0256 02/01/18 0532 02/01/18 0659   BP: 167/80 166/83  (!) 184/86   BP Location: Right arm Right arm  Right arm   Pulse: 71 68  72   Resp: 20 20  19   Temp: 98 3 °F (36 8 °C) 98 5 °F (36 9 °C)  98 4 °F (36 9 °C)   TempSrc: Oral Oral  Oral   SpO2: 98% 99%  99%   Weight:   106 kg (234 lb 9 1 oz)    Height:           Intake/Output Summary (Last 24 hours) at 02/01/18 0811  Last data filed at 02/01/18 0429   Gross per 24 hour   Intake            53 61 ml   Output             3100 ml   Net         -3046 39 ml     General:  No acute distress    Comfortably lying in bed  Skin:  Warm and dry, no rash  Eyes:  Sclera clear, rightward gaze  ENT:  Oropharynx moist  Neck:  Supple no JVD  Chest:  Clear bilaterally no wheezes, rhonchi, rales  CVS:  Regular rhythm, no murmur, rub, gallop appreciated  Abdomen:  Soft, nondistended, nontender, bowel sounds present  Extremities:  trace edema  :  Voiding  Neuro:  Alert and oriented  Psych:  Appropriate    Medications:    Current Facility-Administered Medications:     acetaminophen (TYLENOL) tablet 650 mg, 650 mg, Oral, Q4H PRN, Justina Keene PA-C    albumin human (FLEXBUMIN) 5 % injection 175 g, 175 g, Intravenous, Every Other Day, DEISI Lucio, 175 g at 01/31/18 0841    atorvastatin (LIPITOR) tablet 40 mg, 40 mg, Oral, QPM, Justina Keene PA-C, 40 mg at 01/31/18 1703    calcium acetate (PHOSLO) capsule 667 mg, 667 mg, Oral, TID With Meals, The SAV Saldivar, 667 mg at 01/31/18 1659    carvedilol (COREG) tablet 25 mg, 25 mg, Oral, BID With Meals, DEISI Johnson, 25 mg at 01/31/18 1659    clopidogrel (PLAVIX) tablet 75 mg, 75 mg, Oral, Daily, Nancy Moreno MD, 75 mg at 01/31/18 0840    diphenhydrAMINE (BENADRYL) tablet 25 mg, 25 mg, Oral, HS PRN, Nancy Moreno MD    docusate sodium (COLACE) capsule 100 mg, 100 mg, Oral, BID, Justina Keene PA-C, 100 mg at 01/31/18 0840    ergocalciferol (VITAMIN D2) capsule 50,000 Units, 50,000 Units, Oral, Weekly, Erika Green PA-C, 50,000 Units at 75/24/91 1153    folic acid (FOLVITE) tablet 1 mg, 1 mg, Oral, Daily, Filomena Mcdaniel PA-C, 1 mg at 01/31/18 1416    heparin (porcine) subcutaneous injection 5,000 Units, 5,000 Units, Subcutaneous, Q8H [address], Nancy Moreno MD, 5,000 Units at 02/01/18 0625    hydrALAZINE (APRESOLINE) injection 10 mg, 10 mg, Intravenous, Q6H PRN, Lopez Pandya PA-C, 10 mg at 01/31/18 0413    hydrALAZINE (APRESOLINE) tablet 100 mg, 100 mg, Oral, TID, Radha Epperson MD, 100 mg at 01/31/18 2200    insulin glargine (LANTUS) subcutaneous injection 10 Units, 10 Units, Subcutaneous, Once, MD Sissy Swanson insulin glargine (LANTUS) subcutaneous injection 22 Units, 22 Units, Subcutaneous, Daily With Breakfast, Madhuri Dawkins MD    insulin glargine (LANTUS) subcutaneous injection 22 Units, 22 Units, Subcutaneous, HS, Madhuri Dawkins MD, 22 Units at 01/31/18 2200    insulin lispro (HumaLOG) 100 units/mL subcutaneous injection 1-6 Units, 1-6 Units, Subcutaneous, HS, Madhuri Dawkins MD    insulin lispro (HumaLOG) 100 units/mL subcutaneous injection 12 Units, 12 Units, Subcutaneous, TID With Meals, Madhuri Dawkins MD    insulin lispro (HumaLOG) 100 units/mL subcutaneous injection 2-12 Units, 2-12 Units, Subcutaneous, TID AC **AND** Fingerstick Glucose (POCT), , , TID AC, Daniela Tejeda MD    insulin lispro (HumaLOG) 100 units/mL subcutaneous injection 6 Units, 6 Units, Subcutaneous, Once, Madhuri Dawkins MD    labetalol (NORMODYNE) injection 10 mg, 10 mg, Intravenous, Q4H PRN, Justina Keene PA-C, 10 mg at 01/29/18 0407    LORazepam (ATIVAN) tablet 0 5 mg, 0 5 mg, Oral, Q8H PRN, Arlene Braswell MD    niCARdipine (CARDENE) 25 mg (STANDARD CONCENTRATION) in sodium chloride 0 9% 250 mL, 1-15 mg/hr, Intravenous, Titrated, Justina Keene PA-C, Stopped at 01/26/18 8934    NIFEdipine (PROCARDIA XL) 24 hr tablet 30 mg, 30 mg, Oral, BID, Aislinn Armstrong MD, 30 mg at 01/31/18 1703    ondansetron (ZOFRAN) injection 4 mg, 4 mg, Intravenous, Q6H PRN, TRACY FunezC, 4 mg at 01/29/18 0421    pantoprazole (PROTONIX) EC tablet 40 mg, 40 mg, Oral, Early Morning, DEISI Lucio, 40 mg at 02/01/18 0625    polyethylene glycol (MIRALAX) packet 17 g, 17 g, Oral, Daily, Sylvia Rios DO, 17 g at 01/31/18 1206    promethazine (PHENERGAN) injection 25 mg, 25 mg, Intravenous, Q6H PRN, Arlene Braswell MD, 25 mg at 01/29/18 0644    Laboratory Results:    Results from last 7 days  Lab Units 02/01/18  0410 01/31/18  0425 01/31/18  0423 01/31/18  0961 01/30/18  1001 01/29/18  3795 01/29/18  0482 01/28/18  3438 01/27/18 0432 01/26/18 0432   WBC Thousand/uL 9 76  --  12 72*  --  14 93*  --  18 65* 17 30*  16 63*  --  6 01  --  7 22   HEMOGLOBIN g/dL 9 2*  --  9 0*  --  9 5*  --  9 1* 8 5*  8 5*  --  8 6*  --  8 3*   HEMATOCRIT % 26 7*  --  26 3*  --  28 3*  --  26 5* 24 7*  24 6*  --  25 4*  --  24 1*   PLATELETS Thousands/uL 271  --  308  --  329  --  298 272  273  --  228  --  217   SODIUM mmol/L 142 140  --  138 137 139 139 137  --  137  < > 137   POTASSIUM mmol/L 3 9 4 3  --  4 2 4 4 4 2 4 2 4 3  --  5 0  < > 4 8   CHLORIDE mmol/L 109* 108  --  106 107 106 107 104  --  105  < > 105   CO2 mmol/L 25 23  --  22 21 21 21 20*  --  21  < > 20*   BUN mg/dL 67* 75*  --  73* 69* 71* 71* 70*  --  60*  < > 59*   CREATININE mg/dL 3 76* 4 43*  --  4 42* 4 77* 5 22* 5 24* 6 63*  --  7 38*  < > 8 33*   CALCIUM mg/dL 7 8* 8 4  --  8 3 8 2* 8 6 8 5 8 7  --  8 4  < > 8 1*   PHOSPHORUS mg/dL  --   --   --  5 2*  --   --  5 7*  --   --   --   --   --    GLUCOSE RANDOM mg/dL 107 153*  --  151* 278* 80 80 165*  < > 179*  < > 137   < > = values in this interval not displayed    Previous work up:

## 2018-02-01 NOTE — PROGRESS NOTES
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 )    Hypertensive urgency    Type 1 diabetes mellitus with diabetic nephropathy, with long-term current use of insulin [facility] )    History of lacunar cerebrovascular accident (CVA)    Hyperphosphatemia    Vitamin D deficiency    Azotemia      Plan:    1  Subacute CVA with vision disturbance,  enhancement on brain MRI rule out demyelinating process,  Previous hx of  CVA, negative MS panel, started on plasmapheresis # 2 by Neurology  continue Plavix and statin  Per Hematology no need for therapeutic anticoagulation at this time  follow on B6 and medthylmalonic acid level  2  LLUVIA /CKD stage III with proteinuria, improving,  secondary to ATN with contrast induced nephropathy,  nephrology is following  3  Hypertensive urgency, improving nephrology is following, currently on Coreg, nifedipine, and hydralazine  4  Diabetes mellitus type 1 with A1c 6 4, endocrine is following, off insulin drip,  started on subcu insulin  5  Vitamin-D deficiency, on vitamin-D supplement  6  Leukocytosis, resolved  7  Tobacco smoking, refused nicotine patch  8  Constipation continue with stool softener       VTE Pharmacologic Prophylaxis:   Pharmacologic: Heparin  Mechanical VTE Prophylaxis in Place: Yes    Patient Centered Rounds: I have performed bedside rounds with nursing staff today  Discussions with Specialists or Other Care Team Provider:     Education and Discussions with Family / Patient:  Patient    Time Spent for Care: 30 minutes    More than 50% of total time spent on counseling and coordination of care as described above  Current Length of Stay: 10 day(s)    Current Patient Status: Inpatient   Certification Statement: The patient will continue to require additional inpatient hospital stay due to Management of acute CVA    Discharge Plan / Estimated Discharge Date: not ready yet    Code Status: Level 1 - Full Code      Subjective:   Patient seen and examined  Comfortable in chair  Still with visual disturbances  No chest pain or shortness of breath    Objective:     Vitals:   Temp (24hrs), Av 1 °F (36 7 °C), Min:97 7 °F (36 5 °C), Max:98 5 °F (36 9 °C)    HR:  [67-83] 67  Resp:  [18-20] 19  BP: (111-184)/(60-97) 164/97  SpO2:  [98 %-100 %] 99 %  Body mass index is 32 72 kg/m²  Input and Output Summary (last 24 hours): Intake/Output Summary (Last 24 hours) at 18 1055  Last data filed at 18 0825   Gross per 24 hour   Intake           495 61 ml   Output             3100 ml   Net         -2604 39 ml       Physical Exam:     Physical Exam  Patient is awake alert in no acute distress  Lung clear to auscultation bilateral  Heart positive S1-S2 no murmur  Abdomen soft nontender positive bowel sounds  Lower extremity edema    Additional Data:     Labs:      Results from last 7 days  Lab Units 18  0410   WBC Thousand/uL 9 76   HEMOGLOBIN g/dL 9 2*   HEMATOCRIT % 26 7*   PLATELETS Thousands/uL 271   NEUTROS PCT % 59   LYMPHS PCT % 28   MONOS PCT % 9   EOS PCT % 4       Results from last 7 days  Lab Units 18  0410   SODIUM mmol/L 142   POTASSIUM mmol/L 3 9   CHLORIDE mmol/L 109*   CO2 mmol/L 25   BUN mg/dL 67*   CREATININE mg/dL 3 76*   CALCIUM mg/dL 7 8*   GLUCOSE RANDOM mg/dL 107       Results from last 7 days  Lab Units 18  0417   INR  1 04       * I Have Reviewed All Lab Data Listed Above  * Additional Pertinent Lab Tests Reviewed:  [facility] Admission Reviewed    Imaging:    Imaging Reports Reviewed Today Include:   Imaging Personally Reviewed by Myself Includes:      Recent Cultures (last 7 days):       Results from last 7 days  Lab Units 01/26/18  1037   GRAM STAIN RESULT  No No polys seen  No No bacteria seen       Last 24 Hours Medication List:     Current Facility-Administered Medications:  acetaminophen 650 mg Oral Q4H PRN Justina Keene PA-C    albumin human 175 g Intravenous Every Other Day DEISI Lucio    atorvastatin 40 mg Oral QPM Justina Keene PA-C    calcium acetate 667 mg Oral TID With Meals Irma Sanchez PA-C    carvedilol 25 mg Oral BID With Meals DEISI Bills    clopidogrel 75 mg Oral Daily Madeline Cruz MD    diphenhydrAMINE 25 mg Oral HS PRN Madeline Cruz MD    docusate sodium 100 mg Oral BID Justina Keene PA-C    ergocalciferol 50,000 Units Oral Weekly Erika Green PA-C    folic acid 1 mg Oral Daily Filomena Mcdaniel PA-C    heparin (porcine) 5,000 Units Subcutaneous Q8H [address] Madeline Cruz MD    hydrALAZINE 10 mg Intravenous Q6H PRN Wilma Pennington PA-C    hydrALAZINE 100 mg Oral TID Bertha Ruiz MD    insulin glargine 10 Units Subcutaneous Once Maciel Flowers MD    insulin glargine 22 Units Subcutaneous Daily With Breakfast Maciel Flowers MD    insulin glargine 22 Units Subcutaneous HS Maciel Flowers MD    insulin lispro 1-6 Units Subcutaneous HS Maciel Flowers MD    insulin lispro 12 Units Subcutaneous TID With Meals Maciel Flowers MD    insulin lispro 2-12 Units Subcutaneous TID AC Maciel Flowers MD    insulin lispro 6 Units Subcutaneous Once Maciel Flowers MD    labetalol 10 mg Intravenous Q4H PRN Justina Keene PA-C    LORazepam 0 5 mg Oral Q8H PRN Madeline Cruz MD    niCARdipine 1-15 mg/hr Intravenous Titrated Unique Sellers PA-C Last Rate: Stopped (01/26/18 6053)   NIFEdipine 30 mg Oral BID Bertha Ruiz MD    ondansetron 4 mg Intravenous Q6H PRN Justina Keene PA-C    pantoprazole 40 mg Oral Early Morning DEISI Lucio polyethylene glycol 17 g Oral Daily Carey Marsh DO    promethazine 25 mg Intravenous Q6H PRN Caio Pathak MD         Today, Patient Was Seen By: Carey Marsh DO    ** Please Note: This note has been constructed using a voice recognition system   **

## 2018-02-01 NOTE — CASE MANAGEMENT
Continued Stay Review    Date:2/1/2018    Vital Signs: /90 (BP Location: Right arm)   Pulse 67   Temp 99 °F (37 2 °C) (Oral)   Resp 20   Ht 5' 10" (1 778 m)   Wt 106 kg (234 lb 9 1 oz)   SpO2 99%   BMI 32 72 kg/m²     Medications:   Scheduled Meds:   Current Facility-Administered Medications:  acetaminophen 650 mg Oral Q4H PRN Justina Keene PA-C    albumin human 175 g Intravenous Every Other Day DEISI Lucio    atorvastatin 40 mg Oral QPM Justina Keene PA-C    calcium acetate 667 mg Oral TID With WellSAV Welch    carvedilol 25 mg Oral BID With Meals DEISI Ellis    clopidogrel 75 mg Oral Daily Rusty Fleming MD    diphenhydrAMINE 25 mg Oral HS PRN Rusty Fleming MD    docusate sodium 100 mg Oral BID Justina Keene PA-C    ergocalciferol 50,000 Units Oral Weekly Erika Green PA-C    folic acid 1 mg Oral Daily Filomena Mcdaniel PA-C    heparin (porcine) 5,000 Units Subcutaneous Q8H [address] Rusty Fleming MD    hydrALAZINE 10 mg Intravenous Q6H PRN Wilma Pennington PA-C    hydrALAZINE 100 mg Oral TID Neftali Morgan MD    insulin glargine 22 Units Subcutaneous Daily With Breakfast Dalila Khan MD    insulin glargine 22 Units Subcutaneous HS Dalila Khan MD    insulin lispro 1-6 Units Subcutaneous HS Dalila Khan MD    insulin lispro 2-12 Units Subcutaneous TID AC Dalila Khan MD    insulin lispro 8 Units Subcutaneous TID With Meals Dalila Khan MD    labetalol 10 mg Intravenous Q4H PRN Justina Keene PA-C    LORazepam 0 5 mg Oral Q8H PRN Rusty Fleming MD    niCARdipine 1-15 mg/hr Intravenous Titrated Ranshabbir Reid PA-C Last Rate: Stopped (01/26/18 9018)   NIFEdipine 30 mg Oral BID Neftali Morgan MD    ondansetron 4 mg Intravenous Q6H PRN Justina Keene PA-C    pantoprazole 40 mg Oral Early Morning DEISI Lucio    polyethylene glycol 17 g Oral Daily Rhoderick Lipoma, DO    promethazine 25 mg Intravenous Q6H PRN Rusty Fleming MD      Continuous Infusions:   niCARdipine 1-15 mg/hr Last Rate: Stopped (01/26/18 0837)     PRN Meds:   acetaminophen    diphenhydrAMINE    hydrALAZINE    labetalol    LORazepam    ondansetron    promethazine    Abnormal Labs/Diagnostic Results: bs--  Cl 109--bun 67--cr 3 76--ca 7 8    hgb 9 2/26 7    Age/Sex: 27 y o  male     Assessment/Plan: Principal Problem:    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, 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   Active Problems:    Cerebrovascular accident (CVA) of left pontine structure ([facility] )    Acute renal failure superimposed on stage 3 chronic kidney disease ([facility] [facility] )    Hypertensive urgency    Type 1 diabetes mellitus with diabetic nephropathy, with long-term current use of insulin ([facility])    History of lacunar cerebrovascular accident (CVA)    Hyperphosphatemia    Vitamin D deficiency    Azotemia        Plan:     1   Subacute CVA with vision disturbance,  enhancement on brain MRI rule out demyelinating process,  Previous hx of  CVA, negative MS panel, started on plasmapheresis # 2 by Neurology  continue Plavix and statin  Per Hematology no need for therapeutic anticoagulation at this time  follow on B6 and medthylmalonic acid level  2  LLUVIA /CKD stage III with proteinuria, improving,  secondary to ATN with contrast induced nephropathy,  nephrology is following  3  Hypertensive urgency, improving nephrology is following, currently on Coreg, nifedipine, and hydralazine  4  Diabetes mellitus type 1 with A1c 6 4, endocrine is following, off insulin drip,  started on subcu insulin  5  Vitamin-D deficiency, on vitamin-D supplement  6  Leukocytosis, resolved  7  Tobacco smoking, refused nicotine patch  8  Constipation continue with stool softener        VTE Pharmacologic Prophylaxis:   Pharmacologic: Heparin  Mechanical VTE Prophylaxis in Place:  Yes     Patient Centered Rounds: I have performed bedside rounds with nursing staff today      Discussions with Specialists or Other Care Team Provider:      Education and Discussions with Family / Patient:  Patient     Time Spent for Care: 30 minutes    More than 50% of total time spent on counseling and coordination of care as described above      Current Length of Stay: 10 day(s)     Current Patient Status: Inpatient   Certification Statement: The patient will continue to require additional inpatient hospital stay due to Management of acute CVA       Discharge Plan: lives wit parents- pt recommends post acute ip rehab

## 2018-02-01 NOTE — PROGRESS NOTES
Progress Note - Thelbert Bamberger Mulberger 27 y o  male MRN: [id number]    Unit/Bed#:  Encounter: [id number]      CC: diabetes f/u    Subjective: Brain Whyte is a 27y o  year old male with type 1 diabetes  Feels well  No complaints  Had  Hypoglycemia in AM , was given lantus 20 units at night and insulin infusion was turned off       Objective:     Vitals: Blood pressure 160/90, pulse 67, temperature 99 °F (37 2 °C), temperature source Oral, resp  rate 20, height 5' 10" (1 778 m), weight 106 kg (234 lb 9 1 oz), SpO2 99 %  ,Body mass index is 32 72 kg/m²  Intake/Output Summary (Last 24 hours) at 02/01/18 1516  Last data filed at 02/01/18 1300   Gross per 24 hour   Intake              682 ml   Output             2575 ml   Net            -1893 ml       Physical Exam:  General Appearance: awake, appears stated age and cooperative  Head: Normocephalic, without obvious abnormality, atraumatic  Extremities: moves all extremities  Skin: Skin color and temperature normal    Pulm: no labored breathing    Lab, Imaging and other studies: I have personally reviewed pertinent reports  POC Glucose (mg/dl)   Date Value   02/01/2018 158 (H)   02/01/2018 85   02/01/2018 92   02/01/2018 113   02/01/2018 107   02/01/2018 47 (L)   01/31/2018 59 (L)   01/31/2018 61 (L)   01/31/2018 114   01/31/2018 178 (H)       Assessment:  Type 1 diabetes with hypoglycemia, with CKD stage 4     Plan:  -Reduce lantus to 12 units twice a day   -Reduce humalog to 6 units with meals   -continue sliding scale with meals and at at bedtime   - hypoglycemia protocol is in place     Denis Vigil MD          Portions of the record may have been created with voice recognition software

## 2018-02-02 LAB
ALBUMIN SERPL BCP-MCNC: 2.6 G/DL (ref 3.5–5)
ANION GAP SERPL CALCULATED.3IONS-SCNC: 5 MMOL/L (ref 4–13)
ANION GAP SERPL CALCULATED.3IONS-SCNC: 6 MMOL/L (ref 4–13)
BASOPHILS # BLD MANUAL: 0 THOUSAND/UL (ref 0–0.1)
BASOPHILS NFR MAR MANUAL: 0 % (ref 0–1)
BUN SERPL-MCNC: 66 MG/DL (ref 5–25)
BUN SERPL-MCNC: 67 MG/DL (ref 5–25)
CALCIUM SERPL-MCNC: 7.8 MG/DL (ref 8.3–10.1)
CALCIUM SERPL-MCNC: 7.9 MG/DL (ref 8.3–10.1)
CHLORIDE SERPL-SCNC: 107 MMOL/L (ref 100–108)
CHLORIDE SERPL-SCNC: 108 MMOL/L (ref 100–108)
CO2 SERPL-SCNC: 26 MMOL/L (ref 21–32)
CO2 SERPL-SCNC: 26 MMOL/L (ref 21–32)
CREAT SERPL-MCNC: 3.5 MG/DL (ref 0.6–1.3)
CREAT SERPL-MCNC: 3.55 MG/DL (ref 0.6–1.3)
EOSINOPHIL # BLD MANUAL: 0.38 THOUSAND/UL (ref 0–0.4)
EOSINOPHIL NFR BLD MANUAL: 4 % (ref 0–6)
ERYTHROCYTE [DISTWIDTH] IN BLOOD BY AUTOMATED COUNT: 13.2 % (ref 11.6–15.1)
FIBRINOGEN PPP-MCNC: 205 MG/DL (ref 227–495)
GFR SERPL CREATININE-BSD FRML MDRD: 21 ML/MIN/1.73SQ M
GFR SERPL CREATININE-BSD FRML MDRD: 21 ML/MIN/1.73SQ M
GLUCOSE SERPL-MCNC: 176 MG/DL (ref 65–140)
GLUCOSE SERPL-MCNC: 181 MG/DL (ref 65–140)
GLUCOSE SERPL-MCNC: 240 MG/DL (ref 65–140)
GLUCOSE SERPL-MCNC: 342 MG/DL (ref 65–140)
GLUCOSE SERPL-MCNC: 83 MG/DL (ref 65–140)
GLUCOSE SERPL-MCNC: 86 MG/DL (ref 65–140)
GLUCOSE SERPL-MCNC: 89 MG/DL (ref 65–140)
GLUCOSE SERPL-MCNC: 90 MG/DL (ref 65–140)
HCT VFR BLD AUTO: 26.3 % (ref 36.5–49.3)
HGB BLD-MCNC: 8.9 G/DL (ref 12–17)
LYMPHOCYTES # BLD AUTO: 2.49 THOUSAND/UL (ref 0.6–4.47)
LYMPHOCYTES # BLD AUTO: 26 % (ref 14–44)
MCH RBC QN AUTO: 31 PG (ref 26.8–34.3)
MCHC RBC AUTO-ENTMCNC: 33.8 G/DL (ref 31.4–37.4)
MCV RBC AUTO: 92 FL (ref 82–98)
METHYLMALONATE SERPL-SCNC: 545 NMOL/L (ref 0–378)
MONOCYTES # BLD AUTO: 0.38 THOUSAND/UL (ref 0–1.22)
MONOCYTES NFR BLD: 4 % (ref 4–12)
NEUTROPHILS # BLD MANUAL: 6.13 THOUSAND/UL (ref 1.85–7.62)
NEUTS BAND NFR BLD MANUAL: 1 % (ref 0–8)
NEUTS SEG NFR BLD AUTO: 63 % (ref 43–75)
PHOSPHATE SERPL-MCNC: 4.2 MG/DL (ref 2.7–4.5)
PLATELET # BLD AUTO: 272 THOUSANDS/UL (ref 149–390)
PLATELET BLD QL SMEAR: ADEQUATE
PMV BLD AUTO: 10.7 FL (ref 8.9–12.7)
POTASSIUM SERPL-SCNC: 4.3 MMOL/L (ref 3.5–5.3)
POTASSIUM SERPL-SCNC: 4.3 MMOL/L (ref 3.5–5.3)
RBC # BLD AUTO: 2.87 MILLION/UL (ref 3.88–5.62)
SODIUM SERPL-SCNC: 138 MMOL/L (ref 136–145)
SODIUM SERPL-SCNC: 140 MMOL/L (ref 136–145)
TOTAL CELLS COUNTED SPEC: 100
VARIANT LYMPHS # BLD AUTO: 2 %
WBC # BLD AUTO: 9.58 THOUSAND/UL (ref 4.31–10.16)

## 2018-02-02 PROCEDURE — 82948 REAGENT STRIP/BLOOD GLUCOSE: CPT

## 2018-02-02 PROCEDURE — G8978 MOBILITY CURRENT STATUS: HCPCS

## 2018-02-02 PROCEDURE — 99232 SBSQ HOSP IP/OBS MODERATE 35: CPT | Performed by: PHYSICIAN ASSISTANT

## 2018-02-02 PROCEDURE — 80069 RENAL FUNCTION PANEL: CPT | Performed by: INTERNAL MEDICINE

## 2018-02-02 PROCEDURE — 97164 PT RE-EVAL EST PLAN CARE: CPT

## 2018-02-02 PROCEDURE — 99232 SBSQ HOSP IP/OBS MODERATE 35: CPT | Performed by: INTERNAL MEDICINE

## 2018-02-02 PROCEDURE — 80048 BASIC METABOLIC PNL TOTAL CA: CPT | Performed by: PHYSICIAN ASSISTANT

## 2018-02-02 PROCEDURE — 85384 FIBRINOGEN ACTIVITY: CPT | Performed by: INTERNAL MEDICINE

## 2018-02-02 PROCEDURE — 85027 COMPLETE CBC AUTOMATED: CPT | Performed by: PHYSICIAN ASSISTANT

## 2018-02-02 PROCEDURE — 99233 SBSQ HOSP IP/OBS HIGH 50: CPT | Performed by: NURSE PRACTITIONER

## 2018-02-02 PROCEDURE — 99232 SBSQ HOSP IP/OBS MODERATE 35: CPT | Performed by: NURSE PRACTITIONER

## 2018-02-02 PROCEDURE — 85007 BL SMEAR W/DIFF WBC COUNT: CPT | Performed by: PHYSICIAN ASSISTANT

## 2018-02-02 PROCEDURE — G8979 MOBILITY GOAL STATUS: HCPCS

## 2018-02-02 PROCEDURE — 97116 GAIT TRAINING THERAPY: CPT

## 2018-02-02 RX ORDER — INSULIN GLARGINE 100 [IU]/ML
6 INJECTION, SOLUTION SUBCUTANEOUS
Status: DISCONTINUED | OUTPATIENT
Start: 2018-02-03 | End: 2018-02-05 | Stop reason: HOSPADM

## 2018-02-02 RX ORDER — INSULIN GLARGINE 100 [IU]/ML
6 INJECTION, SOLUTION SUBCUTANEOUS
Status: DISCONTINUED | OUTPATIENT
Start: 2018-02-02 | End: 2018-02-05 | Stop reason: HOSPADM

## 2018-02-02 RX ADMIN — HYDRALAZINE HYDROCHLORIDE 100 MG: 25 TABLET, FILM COATED ORAL at 22:27

## 2018-02-02 RX ADMIN — INSULIN LISPRO 5 UNITS: 100 INJECTION, SOLUTION INTRAVENOUS; SUBCUTANEOUS at 22:30

## 2018-02-02 RX ADMIN — INSULIN LISPRO 8 UNITS: 100 INJECTION, SOLUTION INTRAVENOUS; SUBCUTANEOUS at 08:45

## 2018-02-02 RX ADMIN — ERGOCALCIFEROL 50000 UNITS: 1.25 CAPSULE ORAL at 08:43

## 2018-02-02 RX ADMIN — HEPARIN SODIUM 5000 UNITS: 5000 INJECTION, SOLUTION INTRAVENOUS; SUBCUTANEOUS at 14:27

## 2018-02-02 RX ADMIN — DOCUSATE SODIUM 100 MG: 100 CAPSULE, LIQUID FILLED ORAL at 08:40

## 2018-02-02 RX ADMIN — HYDRALAZINE HYDROCHLORIDE 100 MG: 25 TABLET, FILM COATED ORAL at 17:39

## 2018-02-02 RX ADMIN — CARVEDILOL 25 MG: 12.5 TABLET, FILM COATED ORAL at 17:39

## 2018-02-02 RX ADMIN — CALCIUM ACETATE 667 MG: 667 CAPSULE ORAL at 07:33

## 2018-02-02 RX ADMIN — ALBUMIN HUMAN 175 G: 0.05 INJECTION, SOLUTION INTRAVENOUS at 08:52

## 2018-02-02 RX ADMIN — INSULIN GLARGINE 6 UNITS: 100 INJECTION, SOLUTION SUBCUTANEOUS at 22:30

## 2018-02-02 RX ADMIN — INSULIN GLARGINE 12 UNITS: 100 INJECTION, SOLUTION SUBCUTANEOUS at 08:47

## 2018-02-02 RX ADMIN — HEPARIN SODIUM 5000 UNITS: 5000 INJECTION, SOLUTION INTRAVENOUS; SUBCUTANEOUS at 22:30

## 2018-02-02 RX ADMIN — CARVEDILOL 25 MG: 12.5 TABLET, FILM COATED ORAL at 07:33

## 2018-02-02 RX ADMIN — CLOPIDOGREL BISULFATE 75 MG: 75 TABLET ORAL at 08:40

## 2018-02-02 RX ADMIN — NIFEDIPINE 30 MG: 30 TABLET, FILM COATED, EXTENDED RELEASE ORAL at 19:58

## 2018-02-02 RX ADMIN — ATORVASTATIN CALCIUM 40 MG: 40 TABLET, FILM COATED ORAL at 17:40

## 2018-02-02 RX ADMIN — HYDRALAZINE HYDROCHLORIDE 100 MG: 25 TABLET, FILM COATED ORAL at 08:41

## 2018-02-02 RX ADMIN — HEPARIN SODIUM 5000 UNITS: 5000 INJECTION, SOLUTION INTRAVENOUS; SUBCUTANEOUS at 06:18

## 2018-02-02 RX ADMIN — FOLIC ACID 1 MG: 1 TABLET ORAL at 08:39

## 2018-02-02 RX ADMIN — INSULIN LISPRO 4 UNITS: 100 INJECTION, SOLUTION INTRAVENOUS; SUBCUTANEOUS at 17:40

## 2018-02-02 RX ADMIN — PANTOPRAZOLE SODIUM 40 MG: 40 TABLET, DELAYED RELEASE ORAL at 06:18

## 2018-02-02 RX ADMIN — INSULIN LISPRO 2 UNITS: 100 INJECTION, SOLUTION INTRAVENOUS; SUBCUTANEOUS at 11:47

## 2018-02-02 RX ADMIN — NIFEDIPINE 30 MG: 30 TABLET, FILM COATED, EXTENDED RELEASE ORAL at 08:40

## 2018-02-02 NOTE — PLAN OF CARE
Problem: PHYSICAL THERAPY ADULT  Goal: Performs mobility at highest level of function for planned discharge setting  See evaluation for individualized goals  Treatment/Interventions: Functional transfer training, LE strengthening/ROM, Elevations, Therapeutic exercise, Equipment eval/education, Bed mobility, Gait training, Patient/family training, Cognitive reorientation, Endurance training, Spoke to nursing  Equipment Recommended: Other (Comment) (cane TBD)       See flowsheet documentation for full assessment, interventions and recommendations  Outcome: Progressing  Prognosis: Good  Problem List: Decreased strength, Decreased endurance, Impaired balance, Decreased mobility, Impaired vision, Decreased coordination, Obesity  Assessment: pt shows improvement in mobility status since previous sessions w/ increased ambulation distances and introduction of stair training  pt continues to need standby to min assist to maintain safety  mobility impairments are related to weakness, impaired balance, decreased endurance, and visual impairments  mobility impairments is also evident in Barthel Index score of 70/100  pt's TUG score indicates he is at risk for falls (requiring greater than 13 seconds to complete) and DGI score also indicates pt as fall risk (scoring less than 19 of possible 24 indiciating increased risk for falling)  pt needs continued PT intervention to expedite further improveemnts in mobility status  discharge recommendation is for rehab to [facility] level of functional independence  Barriers to Discharge: Inaccessible home environment (NIRU And stairs inside of home)     Recommendation: Post acute IP rehab          See flowsheet documentation for full assessment

## 2018-02-02 NOTE — PROGRESS NOTES
NEPHROLOGY PROGRESS NOTE   Norma Mckeon 27 y o  male MRN: [id number]  Unit/Bed#:  Encounter: [id number]  Reason for Consult:  Acute kidney injury, CKD, hypertension    ASSESSMENT and PLAN:    The patient is a 61-year-old male with a history of type 1 diabetes mellitus since the age of 1, hypertension, CVA, smoker, hyperlipidemia and chronic kidney disease who was lost to renal follow-up  Shama Douglas presented on 01/22/2018 with a change in vision  Patient was found to have hypertensive urgency  Additionally MRI showed punctate left lateral pontine infarct along with concerns for demyelinating disorder  History of CVA 2015   Creatinine peaked at 8  35   Nephrology following for management of acute on chronic CKD and hypertension   -plasma exchange initiated on 01/29/2018  Fabienne Darnell is for QOD x 10 days     1  Acute kidney injury:  Nonoliguric  Renal function steadily improving   Creatinine peaked at 8 33 -->5 22 on 01/29--->4 77 on 1/30-->4 43 on 1/31-->3 76 on 2/1-->3 55 2/2  · LLUVIA due to ATN due to uncontrolled hypertension, contrast exposure in the setting of altered renal hemodynamics due to ACE-inhibitor, NSAIDs also contribute  · Renal ultrasound right kidney 12 6 cm, left kidney 12 4 cm, normal appearing kidneys with no hydronephrosis noted  · Significant proteinuria >9g   (Consideration for biopsy but will likely not change the course of management at this point)  · Proteinuria:  SPEP, UPEP negative for monoclonal gammopathy  · ANCA negative, CARMEN negative, hepatitis-C antibody negative  · Repeat urine protein creatinine ratio at steady state  Continue to avoid nephrotoxic agents  Avoid hypotension  Continue to monitor renal function, I&O  2  Chronic kidney disease:  Baseline creatinine unknown   Last creatinine 2 3 in 2017     · Etiology of CKD-diabetic nephropathy, hypertensive nephrosclerosis   3   Hypertensive emergency:  · On Coreg 25 mg twice a day, hydralazine 100 mg 3 times a day and nifedipine 30 mg b i d  · Blood pressure improving  · No change in blood pressure medications at this time  Blood pressure tends to decline on days that patient receives pheresis  5  Vision disturbance:  CNS demyelination suspected vs chronic ischemic disease   Patient on high-dose steroids without improvement; on plasma exchange Neurology following  · For plasmapheresis every other day for 10 days  · Status post lumbar puncture  So far workup negative  · No improvement in vision or rightward gaze  6  CVA: Acute left pontine   History of CVA 2015   Neurology following  7  Hyperhomocystinemia, C6&&T homozygosity, heterozygous prothrombin gene mutation:  Hematology/oncology following  8  Hyperphosphatemia:    · Phosphorus down to 4 2  · Discontinue binder  Continue low phosphorus diet and monitor  9  Diabetes mellitus:  Per primary team  10  Anemia:  Iron saturation 28%, ferritin 222  11  Vitamin-D deficiency:  Vitamin-D level 10 9-on ergocalciferol weekly x 12     SUMMARY OF RECOMMENDATIONS:  · Discontinue binder  Continue low phosphorus diet at this time  May be able to liberalize diet if phosphorus remains within normal limits and renal function continues to improve  · Recheck urine protein creatinine ratio at steady stateNo change  · No change in antihypertensives at this time  Continue to monitor blood pressure    SUBJECTIVE / INTERVAL HISTORY:  No complaints  Family at bedside    All questions answered    OBJECTIVE:  Current Weight: Weight - Scale: 106 kg (233 lb 14 5 oz)  Vitals:    02/01/18 2218 02/02/18 0400 02/02/18 0538 02/02/18 0659   BP: 160/83 160/80  (!) 176/86   BP Location: Right arm Right arm  Right arm   Pulse: 70 73  74   Resp: 18 18     Temp: 98 3 °F (36 8 °C) 98 2 °F (36 8 °C)  98 °F (36 7 °C)   TempSrc: Oral Oral  Oral   SpO2:       Weight:   106 kg (233 lb 14 5 oz)    Height:           Intake/Output Summary (Last 24 hours) at 02/02/18 0940  Last data filed at 02/02/18 0749   Gross per 24 hour Intake              240 ml   Output             2875 ml   Net            -2635 ml     General:  No acute distress    Comfortably lying in bed  Skin:  Warm and dry, no rash  Eyes:  Sclera clear, rightward gaze  ENT:  Oropharynx moist  Neck:  Supple no JVD  Chest:  Clear bilaterally no wheezes, rhonchi, rales  CVS:  Regular rhythm, no murmur, rub, gallop appreciated  Abdomen:  Soft, nondistended, nontender, bowel sounds present  Extremities:  trace edema  :  Voiding  Neuro:  Alert and oriented  Psych:  Appropriate    Medications:    Current Facility-Administered Medications:     acetaminophen (TYLENOL) tablet 650 mg, 650 mg, Oral, Q4H PRN, Justina Keene PA-C    albumin human (FLEXBUMIN) 5 % injection 175 g, 175 g, Intravenous, Every Other Day, DEISI Lucio, 175 g at 02/02/18 0852    atorvastatin (LIPITOR) tablet 40 mg, 40 mg, Oral, QPM, Justina Keene PA-C, 40 mg at 02/01/18 1717    calcium acetate (PHOSLO) capsule 667 mg, 667 mg, Oral, TID With Meals, The S[facility] Saldivar, 667 mg at 02/02/18 0284    carvedilol (COREG) tablet 25 mg, 25 mg, Oral, BID With Meals, DEISI Grant, 25 mg at 02/02/18 0733    clopidogrel (PLAVIX) tablet 75 mg, 75 mg, Oral, Daily, Taylor Strauss MD, 75 mg at 02/02/18 0840    diphenhydrAMINE (BENADRYL) tablet 25 mg, 25 mg, Oral, HS PRN, Taylor Strauss MD    docusate sodium (COLACE) capsule 100 mg, 100 mg, Oral, BID, Justina Keene PA-C, 100 mg at 02/02/18 0840    ergocalciferol (VITAMIN D2) capsule 50,000 Units, 50,000 Units, Oral, Weekly, Erika Green PA-C, 50,000 Units at 52/43/37 3828    folic acid (FOLVITE) tablet 1 mg, 1 mg, Oral, Daily, Filomena Mcdaniel PA-C, 1 mg at 02/02/18 0839    heparin (porcine) subcutaneous injection 5,000 Units, 5,000 Units, Subcutaneous, Q8H [address], Taylor Strauss MD, 5,000 Units at 02/02/18 0618    hydrALAZINE (APRESOLINE) injection 10 mg, 10 mg, Intravenous, Q6H PRN, Curry Decker PA-C, 10 mg at 01/31/18 0413    hydrALAZINE (APRESOLINE) tablet 100 mg, 100 mg, Oral, TID, Nicole Proctor MD, 100 mg at 02/02/18 0841    insulin glargine (LANTUS) subcutaneous injection 12 Units, 12 Units, Subcutaneous, Daily With Breakfast, Abena Lester MD, 12 Units at 02/02/18 0847    insulin glargine (LANTUS) subcutaneous injection 12 Units, 12 Units, Subcutaneous, HS, Abena Lester MD, 12 Units at 02/01/18 2120    insulin lispro (HumaLOG) 100 units/mL subcutaneous injection 1-6 Units, 1-6 Units, Subcutaneous, HS, Abena Lester MD, 1 Units at 02/01/18 2123    insulin lispro (HumaLOG) 100 units/mL subcutaneous injection 2-12 Units, 2-12 Units, Subcutaneous, TID AC, 2 Units at 02/01/18 1715 **AND** Fingerstick Glucose (POCT), , , TID AC, Abena Lester MD    insulin lispro (HumaLOG) 100 units/mL subcutaneous injection 8 Units, 8 Units, Subcutaneous, TID With Meals, Abena Lester MD, 8 Units at 02/02/18 0845    labetalol (NORMODYNE) injection 10 mg, 10 mg, Intravenous, Q4H PRN, Justina Keene PA-C, 10 mg at 01/29/18 0407    LORazepam (ATIVAN) tablet 0 5 mg, 0 5 mg, Oral, Q8H PRN, Christin Navarro MD    niCARdipine (CARDENE) 25 mg (STANDARD CONCENTRATION) in sodium chloride 0 9% 250 mL, 1-15 mg/hr, Intravenous, Titrated, Justina Keene PA-C, Stopped at 01/26/18 3824    NIFEdipine (PROCARDIA XL) 24 hr tablet 30 mg, 30 mg, Oral, BID, Nicole Proctor MD, 30 mg at 02/02/18 0840    ondansetron (ZOFRAN) injection 4 mg, 4 mg, Intravenous, Q6H PRN, Justina Keene PA-C, 4 mg at 01/29/18 0421    pantoprazole (PROTONIX) EC tablet 40 mg, 40 mg, Oral, Early Morning, DEISI Lucio, 40 mg at 02/02/18 0618    polyethylene glycol (MIRALAX) packet 17 g, 17 g, Oral, Daily, Bri Rodriguez DO, 17 g at 01/31/18 1206    promethazine (PHENERGAN) injection 25 mg, 25 mg, Intravenous, Q6H PRN, Christin Navarro MD, 25 mg at 01/29/18 3548    Laboratory Results:    Results from last 7 days  Lab Units 02/02/18  0417 02/01/18  0410 01/31/18  042 01/31/18  0423 01/31/18  0417 01/30/18  1001 01/29/18  1581 01/29/18  0625 01/28/18  0420  01/27/18  0432   WBC Thousand/uL 9 58 9 76  --  12 72*  --  14 93*  --  18 65* 17 30*  16 63*  --  6 01   HEMOGLOBIN g/dL 8 9* 9 2*  --  9 0*  --  9 5*  --  9 1* 8 5*  8 5*  --  8 6*   HEMATOCRIT % 26 3* 26 7*  --  26 3*  --  28 3*  --  26 5* 24 7*  24 6*  --  25 4*   PLATELETS Thousands/uL 272 271  --  308  --  329  --  298 272  273  --  228   SODIUM mmol/L 140  138 142 140  --  138 137 139 139 137  --  137   POTASSIUM mmol/L 4 3  4 3 3 9 4 3  --  4 2 4 4 4 2 4 2 4 3  --  5 0   CHLORIDE mmol/L 108  107 109* 108  --  106 107 106 107 104  --  105   CO2 mmol/L 26  26 25 23  --  22 21 21 21 20*  --  21   BUN mg/dL 66*  67* 67* 75*  --  73* 69* 71* 71* 70*  --  60*   CREATININE mg/dL 3 55*  3 50* 3 76* 4 43*  --  4 42* 4 77* 5 22* 5 24* 6 63*  --  7 38*   CALCIUM mg/dL 7 9*  7 8* 7 8* 8 4  --  8 3 8 2* 8 6 8 5 8 7  --  8 4   PHOSPHORUS mg/dL 4 2  --   --   --  5 2*  --   --  5 7*  --   --   --    GLUCOSE RANDOM mg/dL 83  90 107 153*  --  151* 278* 80 80 165*  < > 179*   < > = values in this interval not displayed    Previous work up:

## 2018-02-02 NOTE — PROGRESS NOTES
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   Active Problems:    Cerebrovascular accident (CVA) of left pontine structure ([facility] [address] )    Acute renal failure superimposed on stage 3 chronic kidney disease ([facility] )    Hypertensive urgency    Type 1 diabetes mellitus with diabetic nephropathy, with long-term current use of insulin ([facility] )    History of lacunar cerebrovascular accident (CVA)    Hyperphosphatemia    Vitamin D deficiency    Azotemia      Plan:    1  Subacute CVA with visual disturbance,  enhancement on brain MRI,  rule out demyelinating process,  Previous hx of  CVA, negative MS panel, started on plasmapheresis # 3 by Neurology  continue Plavix and statin  Per Hematology no need for therapeutic anticoagulation at this time  follow on B6 and medthylmalonic acid level, continue physical therapy  2  LLUVIA /CKD stage III with proteinuria, improving,  secondary to ATN with contrast induced nephropathy,  nephrology is following  3  Hypertensive urgency, improving,  nephrology is following, currently on Coreg, nifedipine, and hydralazine  4  Diabetes mellitus type 1 with A1c 6 4, endocrine is following, off insulin drip,  on subcu insulin  5  Vitamin-D deficiency, on vitamin-D supplement  6  Tobacco smoking, refused nicotine patch  7  Constipation continue with stool softener    Down grade to med/ surge bed  Will discuss with Neurology to round on patient       VTE Pharmacologic Prophylaxis:   Pharmacologic: Heparin  Mechanical VTE Prophylaxis in Place: Yes    Patient Centered Rounds: I have performed bedside rounds with nursing staff today      Discussions with Specialists or Other Care Team Provider:     Education and Discussions with Family / Patient: Patient and his mother    Time Spent for Care: 30 minutes  More than 50% of total time spent on counseling and coordination of care as described above  Current Length of Stay: 11 day(s)    Current Patient Status: Inpatient   Certification Statement: The patient will continue to require additional inpatient hospital stay due to Management of acute CVA    Discharge Plan / Estimated Discharge Date: not ready yet    Code Status: Level 1 - Full Code      Subjective:   Patient seen and examined  Comfortable in chair  Still with visual disturbances  No chest pain or shortness of breath    Objective:     Vitals:   Temp (24hrs), Av 1 °F (36 7 °C), Min:97 8 °F (36 6 °C), Max:98 6 °F (37 °C)    HR:  [70-81] 81  Resp:  [18-20] 18  BP: (139-176)/(70-86) 147/70  SpO2:  [99 %] 99 %  Body mass index is 32 62 kg/m²  Input and Output Summary (last 24 hours):        Intake/Output Summary (Last 24 hours) at 18 1158  Last data filed at 18 1154   Gross per 24 hour   Intake              240 ml   Output             3575 ml   Net            -3335 ml       Physical Exam:     Physical Exam  Patient is awake alert in no acute distress  Lung clear to auscultation bilateral  Heart positive S1-S2 no murmur  Abdomen soft nontender positive bowel sounds  Lower extremity edema    Additional Data:     Labs:      Results from last 7 days  Lab Units 18  0417 18  0410   WBC Thousand/uL 9 58 9 76   HEMOGLOBIN g/dL 8 9* 9 2*   HEMATOCRIT % 26 3* 26 7*   PLATELETS Thousands/uL 272 271   NEUTROS PCT %  --  59   LYMPHS PCT %  --  28   LYMPHO PCT % 26  --    MONOS PCT %  --  9   MONO PCT MAN % 4  --    EOS PCT %  --  4   EOSINO PCT MANUAL % 4  --        Results from last 7 days  Lab Units 18  0417   SODIUM mmol/L 140  138   POTASSIUM mmol/L 4 3  4 3   CHLORIDE mmol/L 108  107   CO2 mmol/L 26  26   BUN mg/dL 66*  67*   CREATININE mg/dL 3 55*  3 50*   CALCIUM mg/dL 7 9*  7 8*   GLUCOSE RANDOM mg/dL 83  90 Results from last 7 days  Lab Units 01/31/18  0417   INR  1 04       * I Have Reviewed All Lab Data Listed Above  * Additional Pertinent Lab Tests Reviewed: [facility] Admission Reviewed    Imaging:    Imaging Reports Reviewed Today Include:   Imaging Personally Reviewed by Myself Includes:      Recent Cultures (last 7 days):           Last 24 Hours Medication List:     Current Facility-Administered Medications:  acetaminophen 650 mg Oral Q4H PRN Justina Keene PA-C   albumin human 175 g Intravenous Every Other Day DEISI Lucio   atorvastatin 40 mg Oral QPM Justina Keene PA-C   carvedilol 25 mg Oral BID With Meals DEISI Gtz   clopidogrel 75 mg Oral Daily Michell Chou MD   diphenhydrAMINE 25 mg Oral HS PRN Michell Chou MD   docusate sodium 100 mg Oral BID Lavella GoSAV   ergocalciferol 50,000 Units Oral Weekly Erika SAV Green   folic acid 1 mg Oral Daily Filomena Mcdaniel PA-C   heparin (porcine) 5,000 Units Subcutaneous Q8H [address] Michell Chou MD   hydrALAZINE 10 mg Intravenous Q6H PRN Wilma Pennington PA-C   hydrALAZINE 100 mg Oral TID Verner Hollering, MD   insulin glargine 12 Units Subcutaneous Daily With Breakfast Marcela Bradshaw MD   insulin glargine 12 Units Subcutaneous HS Marcela Bradshaw MD   insulin lispro 1-6 Units Subcutaneous HS Marcela Bradshaw MD   insulin lispro 2-12 Units Subcutaneous TID AC Daniela Tejeda MD   labetalol 10 mg Intravenous Q4H PRN Justina Keene PA-C   LORazepam 0 5 mg Oral Q8H PRN Michell Chou MD   NIFEdipine 30 mg Oral BID Verner Hollering, MD   ondansetron 4 mg Intravenous Q6H PRN Justina Keene PA-C   pantoprazole 40 mg Oral Early Morning DEISI Lucio   polyethylene glycol 17 g Oral Daily Leonela Puri DO   promethazine 25 mg Intravenous Q6H PRN Michell Chou MD        Today, Patient Was Seen By: Leonela Puri DO    ** Please Note: This note has been constructed using a voice recognition system   **

## 2018-02-02 NOTE — PROGRESS NOTES
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 and heterozygosity for PTH gene mutation  He was not followed by a hematologist  Since that time, he has not experienced any other episodes of thrombus (DVT, PE, etc) until this recent admission which is being worked up for stroke vs demyelinating disorder by  Neurology team CT Head and MRI head were unrevealing for CVA  He has been initiated on plasmapheresis and corticosteroids for possible demyelinating disorder       He is a current smoker ~ 1/2 to 1 pk per day, ETOH is socially ~ 1 drink/week  He is a Type I diabetic insulin dependent  His mother had breast cancer 10 years ago, currently disease free, he has a maternal great GF w/ history of strokes  Otherwise no pertinent FH      Assessment/Plan:   #1 Hyperhomocystenemia, 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,prothrombin gene heterozygosity  -elevated homocysteine level to 17 4 umol/L on 1/22  Folic Acid levels within normal range  Initiated on Folic Acid supplementation 1000 mg/daily  -B6 and B12 levels are pending, when final resulted recommend replete PRN  -He is at high risk for cardiovascular events, which is not reversible  Recommend proactive with tight control of blood pressure, diabetes, lipids  Recommend DVT ppx when immobile for prolonged periods of time such as surgery, hospitalization, otherwise therapeutic anticoagulation not clearly indicated in this case     -Stroke prophylaxis/anti-platlet per Neurology with Plavix and statin  -He has an outpatient follow up in Hematology on 3/5/2018 at 2:20 pm to establish care and determine appropriate follow up  We will sign off, please contact us if you have any questions  Subjective:  No complaints today, he reports feeling very well today  Review of Systems   All other systems reviewed and are negative  Objective:  BP (!) 176/86 (BP Location: Right arm)   Pulse 74   Temp 98 °F (36 7 °C) (Oral)   Resp 18   Ht 5' 10" (1 778 m)   Wt 106 kg (233 lb 14 5 oz)   SpO2 99%   BMI 32 62 kg/m²     Physical Exam   Constitutional: He is oriented to person, place, and time  He appears well-nourished  No distress  Eyes: Conjunctivae are normal  No scleral icterus  Abdominal: He exhibits no distension  Musculoskeletal: He exhibits edema  Neurological: He is alert and oriented to person, place, and time  Skin: Skin is warm and dry  Vitals reviewed  Recent Labs      01/31/18   0423  01/31/18   0425  02/01/18   0410  02/02/18   0417   WBC  12 72*   --   9 76  9 58   HGB  9 0*   --   9 2*  8 9*   PLT  308   --   271  272   MCV  90   --   90  92   RDW  13 0   --   13 1  13 2   CREATININE   --   4 43*  3 76*  3 55*  3 50*     Laboratory studies were reviewed    Imaging Studies:        Pathology: None    Code Status: Level 1 - Full Code    Counseling / Coordination of Care  Total floor / unit time spent today 15 minutes  Greater than 50% of total time was spent with the patient and / or family counseling and / or coordination of care

## 2018-02-02 NOTE — PROGRESS NOTES
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  vs CNS demyelination  Continues with right b/l gaze palsy despite treatment with steroids and plasmapheresis  MS panel negative with myelin Basic protein 12 2 and zero oligo bands observed in CSF  Homocysteine level to 17 4 on 1/22/18  2  CVA of left pontine structure with h/o stroke in 2015  Anticoagulation workup revealed Thrombosis panel with prothrombin gene mutation single [id number] in factor II (prothrombin)  Hematology consulted and appreciate recommendations  Plan:  -discussed with Attending, no more plasmapheresis indicated at this time   -initiate dual antiplatelet therapy with Plavix 75 mg and  mg daily  -continue with Lipitor 40 mg daily  - continue with PT/OT treatment; consider rehabilitation  -f/u with with outpatient [facility] Neurology stroke program with MD Atul Zabala in 4-6 weeks    We will sign off, please contact with any question      Subjective:   No acute events overnight  Continues with right b/l gaze palsy but otherwise reports feeling well today  All other systems reviewed and are negative    Vitals: Blood pressure 147/70, pulse 81, temperature 97 9 °F (36 6 °C), temperature source Oral, resp  rate 16, height 5' 10" (1 778 m), weight 106 kg (233 lb 14 5 oz), SpO2 99 %  ,Body mass index is 32 62 kg/m²      MEDS:    Current Facility-Administered Medications:  acetaminophen 650 mg Oral Q4H PRN Jesisca Lin FIFI Keene PA-C   albumin human 175 g Intravenous Every Other Day DEISI Lucio   atorvastatin 40 mg Oral QPM Justina Keene PA-C   carvedilol 25 mg Oral BID With Meals DEISI Castillo   clopidogrel 75 mg Oral Daily Chico Sagastume MD   diphenhydrAMINE 25 mg Oral HS PRN Chico Sagastume MD   docusate sodium 100 mg Oral BID Liang GrSAV   ergocalciferol 50,000 Units Oral Weekly Erika Green PA-C   folic acid 1 mg Oral Daily Filomena Mcdaniel PA-C   heparin (porcine) 5,000 Units Subcutaneous Q8H [facility] Chico Sagastume MD   hydrALAZINE 10 mg Intravenous Q6H PRN Wilma Pennington PA-C   hydrALAZINE 100 mg Oral TID Nino Post MD   insulin glargine 12 Units Subcutaneous Daily With Breakfast Misha Amin MD   insulin glargine 12 Units Subcutaneous HS Misha Amin MD   insulin lispro 1-6 Units Subcutaneous HS Misha Amin MD   insulin lispro 2-12 Units Subcutaneous TID AC Misha Amin MD   labetalol 10 mg Intravenous Q4H PRN Justina Keene PA-C   LORazepam 0 5 mg Oral Q8H PRN Chico Sagastume MD   NIFEdipine 30 mg Oral BID Nino Post MD   ondansetron 4 mg Intravenous Q6H PRN Justina Keene PA-C   pantoprazole 40 mg Oral Early Morning DEISI Lucio   polyethylene glycol 17 g Oral Daily Aristeo Wagner DO   promethazine 25 mg Intravenous Q6H PRN Chico Sagastume MD   :        Physical Exam:    Pat seen in: bed with parents at bedside  General appearance: awake, alert, oriented, continues with right B/L gaze palsy    Neck: No carotid bruit  Lungs, Heart, & abdomen WNL  Extremities: atraumatic, with B/L lower extremity edema  Physical Exam   Constitutional: He is oriented to person, place, and time  Neurological: He is oriented to person, place, and time  He has normal strength  Psychiatric: His speech is normal        Neurologic Exam     Mental Status   Oriented to person, place, and time  Oriented to year and month     Attention: normal  Concentration: normal    Speech: speech is normal   Level of consciousness: alert  Knowledge: good  Cranial Nerves     CN II   Visual acuity: normal  Right visual field deficit: none  Left visual field deficit: none     CN III, IV, VI   Right pupil: Size: 2 mm  Shape: regular  Reactivity: brisk  Left pupil: Size: 2 mm  Shape: regular  Reactivity: brisk  continues with right B/L gaze palsy; CN VII-XI within normal limits     Motor Exam   Muscle bulk: normal  Overall muscle tone: normal    Strength   Strength 5/5 throughout  Sensory Exam   Light touch normal        Lab Results:   I have personally reviewed pertinent reports  , CBC:   Results from last 7 days  Lab Units 02/02/18 0417 02/01/18 0410 01/31/18  0423   WBC Thousand/uL 9 58 9 76 12 72*   RBC Million/uL 2 87* 2 96* 2 93*   HEMOGLOBIN g/dL 8 9* 9 2* 9 0*   HEMATOCRIT % 26 3* 26 7* 26 3*   MCV fL 92 90 90   PLATELETS Thousands/uL 272 271 308   , BMP/CMP:   Results from last 7 days  Lab Units 02/02/18 0417 02/01/18 0410 01/31/18  0425   SODIUM mmol/L 140  138 142 140   POTASSIUM mmol/L 4 3  4 3 3 9 4 3   CHLORIDE mmol/L 108  107 109* 108   CO2 mmol/L 26  26 25 23   ANION GAP mmol/L 6  5 8 9   BUN mg/dL 66*  67* 67* 75*   CREATININE mg/dL 3 55*  3 50* 3 76* 4 43*   GLUCOSE RANDOM mg/dL 83  90 107 153*   CALCIUM mg/dL 7 9*  7 8* 7 8* 8 4   EGFR ml/min/1 73sq m 21  21 20 16     Coagulation:   Results from last 7 days  Lab Units 01/31/18 0417   INR  1 04     Vitamin D 25 Hydroxy: 1/24/18  10 9    Imaging Studies: I have personally reviewed pertinent reports  EEG, Pathology, and Other Studies: I have personally reviewed pertinent reports        VTE Prophylaxis: Heparin

## 2018-02-02 NOTE — PHYSICAL THERAPY NOTE
PHYSICAL THERAPY REEVAL NOTE    Patient Name: Zita Dennis  YQYDOCARO Date: 2/2/2018 02/02/18 1500   Pain Assessment   Pain Assessment No/denies pain   Restrictions/Precautions   Other Precautions Fall Risk   General   Chart Reviewed Yes   Additional Pertinent History Pt was seen previously for PT intervention  Pt exceeded previously established goals  Reeval completed due to pt's exceeding goals  Dx: binocular vision w/ conjugate gaze palsy and CVA of left pontine structure  Comorbidities: CVA, DM, and HTN  Personal factors: steps to enter home and living in [facility]  UEs: 4/5  LEs: 4+/5  Light touch normal all extremities  Impaired coordination all extremities  Pt has overall visual impairment, worse on right  Clinical presentation: evolving (evident in need for assist w/ all phases of mobility, need for input to maintain mobility safety, visual impairment affecting mobility status and safety)  Family/Caregiver Present No   Cognition   Overall Cognitive Status WFL   Arousal/Participation Alert; Cooperative   Attention Within functional limits   Orientation Level Oriented X4;Other (Comment)  (pt identified w/ full name and birth date)   Following Commands Follows multistep commands with increased time or repetition   Subjective   Subjective pt seen sitting in chair w/ family present  agreed to PT eval  denied pain  notes continued visual impairment and feeling unseatdy w/ mobility  Transfers   Sit to Stand 5  Supervision   Additional items Increased time required   Stand to Sit 5  Supervision   Additional items Verbal cues  (for body positioning, controlled descent)   Additional Comments timed up and go: 24 3 seconds  dynamic gait index 14/24  Ambulation/Elevation   Gait pattern Wide COLBY; Decreased foot clearance;Shuffling; Ataxia; Inconsistent kailee; Excessively slow   Gait Assistance 5  Supervision   Additional items Verbal cues  (for obstacle negotiation, finding room)   Distance 150 feet x3 w/ standing rest breaks x 2 to 3 minutes each   Stair Management Assistance 4  Minimal assist   Additional items Assist x 1;Verbal cues; Tactile cues  (for railing use, breathing technique)   Stair Management Technique One rail L;Step to pattern;Nonreciprocal   Number of Stairs 4  (additional not possible due to fatigue )   Balance   Static Sitting Good   Dynamic Sitting Fair   Static Standing Fair -   Dynamic Standing Fair -   Ambulatory Fair -   Activity Tolerance   Activity Tolerance Patient limited by fatigue   Nurse Made Aware spoke to [address], [address]    Assessment   Prognosis Good   Problem List Decreased strength;Decreased endurance; Impaired balance;Decreased mobility; Impaired vision;Decreased coordination;Obesity   Assessment pt shows improvement in mobility status since previous sessions w/ increased ambulation distances and introduction of stair training  pt continues to need standby to min assist to maintain safety  mobility impairments are related to weakness, impaired balance, decreased endurance, and visual impairments  mobility impairments is also evident in Barthel Index score of 70/100  pt's TUG score indicates he is at risk for falls (requiring greater than 13 seconds to complete) and DGI score also indicates pt as fall risk (scoring less than 19 of possible 24 indiciating increased risk for falling)  pt needs continued PT intervention to expedite further improveemnts in mobility status  discharge recommendation is for rehab to [facility] level of functional independence  Goals   Patient Goals get better, be able to drive   STG Expiration Date 02/09/18   Short Term Goal #1 pt will:  Increase bilateral LE strength 1/2 grade to facilitate independent mobility, Perform all transfers independently to improve independence, Ambulate 600 ft  without assistive device modified independent w/o LOB, Navigate 10 stairs w/ supervision with unilateral handrail to facilitate return to previous living environment, Increase ambulatory balance 1/2 grade to decrease risk for falls, Complete exercise program independently, Tolerate 3 hr OOB to faciliate upright tolerance, Improve Barthel Index score to 90 or greater to facilitate independence, Complete TUG in 15 seconds or less to decrease risk for falls, Complete Dynamic Gait Index w/ score of 18 or greater to decrease fall risk factors   Treatment Day 1   Plan   Treatment/Interventions Functional transfer training;LE strengthening/ROM; Elevations; Therapeutic exercise; Endurance training;Patient/family training;Gait training; Compensatory technique education   Progress Progressing toward goals   PT Frequency 5x/wk; Other (Comment)  (and 1x on weekend)   Recommendation   Recommendation Post acute IP rehab     Skilled inpatient PT recommended while in hospital to progress pt toward treatment goals      Yakelin Johnna , PT

## 2018-02-02 NOTE — PROGRESS NOTES
Progress Note - Primitivo Mckeon 27 y o  male MRN: [id number]    Unit/Bed#:  Encounter: [id number]      CC: diabetes f/u    Subjective: Isabelle Handley is a 27y o  year old male with type 2 diabetes  Feels well  No complaints  No hypoglycemia  Did not receive Humalog today   Blood sugars tightly controlled   LLUVIA/CKD improving       Lab Results   Component Value Date    CREATININE 3 55 (H) 02/02/2018    CREATININE 3 50 (H) 02/02/2018         Objective:     Vitals: Blood pressure 147/70, pulse 81, temperature 97 9 °F (36 6 °C), temperature source Oral, resp  rate 16, height 5' 10" (1 778 m), weight 106 kg (233 lb 14 5 oz), SpO2 99 %  ,Body mass index is 32 62 kg/m²  Intake/Output Summary (Last 24 hours) at 02/02/18 1425  Last data filed at 02/02/18 1154   Gross per 24 hour   Intake                0 ml   Output             3575 ml   Net            -3575 ml       Physical Exam:  General Appearance: awake, appears stated age and cooperative  Head: Normocephalic, without obvious abnormality, atraumatic  Extremities: moves all extremities  Skin: Skin color and temperature normal    Pulm: no labored breathing    Lab, Imaging and other studies: I have personally reviewed pertinent reports  POC Glucose (mg/dl)   Date Value   02/02/2018 181 (H)   02/02/2018 176 (H)   02/02/2018 89   02/02/2018 86   02/01/2018 177 (H)   02/01/2018 198 (H)   02/01/2018 216 (H)   02/01/2018 158 (H)   02/01/2018 85   02/01/2018 92       Assessment:  Type 1 diabetes with CKD, admitted for ? CVA and visual disturbance   Treated with Plasma pheresis and solumedrol by neurology, and now off the steroids   Blood sugars trending down     Plan:  Reduce lantus to 6 units twice a day   Start humalog from tomorrow, 4 units with meals +scale   Continue humalog sliding scale at bedtime   Hypoglycemia protocol is in place     Abner Kwok MD        Portions of the record may have been created with voice recognition software

## 2018-02-03 LAB
ANION GAP SERPL CALCULATED.3IONS-SCNC: 6 MMOL/L (ref 4–13)
BUN SERPL-MCNC: 60 MG/DL (ref 5–25)
CALCIUM SERPL-MCNC: 7.9 MG/DL (ref 8.3–10.1)
CHLORIDE SERPL-SCNC: 108 MMOL/L (ref 100–108)
CO2 SERPL-SCNC: 27 MMOL/L (ref 21–32)
CREAT SERPL-MCNC: 3.41 MG/DL (ref 0.6–1.3)
GFR SERPL CREATININE-BSD FRML MDRD: 22 ML/MIN/1.73SQ M
GLUCOSE SERPL-MCNC: 181 MG/DL (ref 65–140)
GLUCOSE SERPL-MCNC: 197 MG/DL (ref 65–140)
GLUCOSE SERPL-MCNC: 213 MG/DL (ref 65–140)
GLUCOSE SERPL-MCNC: 304 MG/DL (ref 65–140)
GLUCOSE SERPL-MCNC: 375 MG/DL (ref 65–140)
POTASSIUM SERPL-SCNC: 4.5 MMOL/L (ref 3.5–5.3)
SODIUM SERPL-SCNC: 141 MMOL/L (ref 136–145)

## 2018-02-03 PROCEDURE — 80048 BASIC METABOLIC PNL TOTAL CA: CPT | Performed by: NURSE PRACTITIONER

## 2018-02-03 PROCEDURE — 82948 REAGENT STRIP/BLOOD GLUCOSE: CPT

## 2018-02-03 PROCEDURE — 99232 SBSQ HOSP IP/OBS MODERATE 35: CPT | Performed by: INTERNAL MEDICINE

## 2018-02-03 RX ORDER — ASPIRIN 81 MG/1
162 TABLET, CHEWABLE ORAL DAILY
Status: DISCONTINUED | OUTPATIENT
Start: 2018-02-04 | End: 2018-02-05 | Stop reason: HOSPADM

## 2018-02-03 RX ADMIN — HEPARIN SODIUM 5000 UNITS: 5000 INJECTION, SOLUTION INTRAVENOUS; SUBCUTANEOUS at 14:16

## 2018-02-03 RX ADMIN — HYDRALAZINE HYDROCHLORIDE 100 MG: 25 TABLET, FILM COATED ORAL at 22:04

## 2018-02-03 RX ADMIN — INSULIN GLARGINE 6 UNITS: 100 INJECTION, SOLUTION SUBCUTANEOUS at 08:15

## 2018-02-03 RX ADMIN — NIFEDIPINE 30 MG: 30 TABLET, FILM COATED, EXTENDED RELEASE ORAL at 08:13

## 2018-02-03 RX ADMIN — NIFEDIPINE 30 MG: 30 TABLET, FILM COATED, EXTENDED RELEASE ORAL at 18:34

## 2018-02-03 RX ADMIN — HYDRALAZINE HYDROCHLORIDE 100 MG: 25 TABLET, FILM COATED ORAL at 08:13

## 2018-02-03 RX ADMIN — CARVEDILOL 25 MG: 12.5 TABLET, FILM COATED ORAL at 06:31

## 2018-02-03 RX ADMIN — ATORVASTATIN CALCIUM 40 MG: 40 TABLET, FILM COATED ORAL at 18:34

## 2018-02-03 RX ADMIN — INSULIN LISPRO 4 UNITS: 100 INJECTION, SOLUTION INTRAVENOUS; SUBCUTANEOUS at 12:05

## 2018-02-03 RX ADMIN — HYDRALAZINE HYDROCHLORIDE 100 MG: 25 TABLET, FILM COATED ORAL at 16:40

## 2018-02-03 RX ADMIN — INSULIN LISPRO 4 UNITS: 100 INJECTION, SOLUTION INTRAVENOUS; SUBCUTANEOUS at 08:16

## 2018-02-03 RX ADMIN — INSULIN GLARGINE 6 UNITS: 100 INJECTION, SOLUTION SUBCUTANEOUS at 22:04

## 2018-02-03 RX ADMIN — DOCUSATE SODIUM 100 MG: 100 CAPSULE, LIQUID FILLED ORAL at 18:35

## 2018-02-03 RX ADMIN — PANTOPRAZOLE SODIUM 40 MG: 40 TABLET, DELAYED RELEASE ORAL at 06:31

## 2018-02-03 RX ADMIN — INSULIN LISPRO 4 UNITS: 100 INJECTION, SOLUTION INTRAVENOUS; SUBCUTANEOUS at 16:44

## 2018-02-03 RX ADMIN — CARVEDILOL 25 MG: 12.5 TABLET, FILM COATED ORAL at 16:39

## 2018-02-03 RX ADMIN — DOCUSATE SODIUM 100 MG: 100 CAPSULE, LIQUID FILLED ORAL at 08:13

## 2018-02-03 RX ADMIN — CLOPIDOGREL BISULFATE 75 MG: 75 TABLET ORAL at 08:12

## 2018-02-03 RX ADMIN — FOLIC ACID 1 MG: 1 TABLET ORAL at 08:13

## 2018-02-03 RX ADMIN — HEPARIN SODIUM 5000 UNITS: 5000 INJECTION, SOLUTION INTRAVENOUS; SUBCUTANEOUS at 06:31

## 2018-02-03 RX ADMIN — HEPARIN SODIUM 5000 UNITS: 5000 INJECTION, SOLUTION INTRAVENOUS; SUBCUTANEOUS at 22:03

## 2018-02-03 RX ADMIN — INSULIN LISPRO 2 UNITS: 100 INJECTION, SOLUTION INTRAVENOUS; SUBCUTANEOUS at 22:07

## 2018-02-03 RX ADMIN — INSULIN LISPRO 2 UNITS: 100 INJECTION, SOLUTION INTRAVENOUS; SUBCUTANEOUS at 08:17

## 2018-02-03 RX ADMIN — INSULIN LISPRO 10 UNITS: 100 INJECTION, SOLUTION INTRAVENOUS; SUBCUTANEOUS at 12:06

## 2018-02-03 RX ADMIN — INSULIN LISPRO 8 UNITS: 100 INJECTION, SOLUTION INTRAVENOUS; SUBCUTANEOUS at 16:43

## 2018-02-03 NOTE — PROGRESS NOTES
NEPHROLOGY PROGRESS NOTE   Marisa Mckeon 27 y o  male MRN: [id number]  Unit/Bed#:  Encounter: [id number]  Reason for Consult: LLUVIA/CKd    ASSESSMENT AND PLAN:  Patient is a 64-year-old male with type 1 diabetes since age of 1, hypertension, history of CVA, hyperlipidemia, CKD stage IIIB with baseline serum creatinine 2 2 to 2 3, presented with change in vision and found to have hypertensive urgency  We are consulted for Lluvia/CKd      Nonoliguric LLUVIA (POA) on CKD stage IIIB, baseline serum creatinine 2 2 to 2 3 in 2017, follows with Dr Marianna Stuart  - LLUVIA likely secondary to ATN + contrast induced nephropathy (creatinine worsened after contrast exposure although patient already had LLUVIA before getting contrast) + use of ACE-inhibitor, peak creatinine 8 3 continue to improve to 3 4 today  - urine output good and likely in post ATN diuretic phase  - avoid nephrotoxins, monitor intake and output  - renal ultrasound showed normal size kidneys, no hydro  - currently remains off diuretics and off IV fluid  - initial urinalysis showed 1 to 2 RBCs, greater than 3+ proteinuria     CKD likely secondary to long-term diabetes and hypertension     Nephrotic range proteinuria  - urine microalbumin/creatinine ratio 9 2 g this is not in a steady state  - last hemoglobin A1c 6 4 this admission although previously uncontrolled diabetes  - Will need repeat UPC ratio and preferably 24 hour urine collection for urine protein and creatinine once renal function is stable and improved for better assessment and can re-evaluate at that point for any need for renal biopsy  - urinalysis shows no significant hematuria  - SPEP and UPEP negative, Anca negative, CARMEN negative, rheumatoid factor negative, check complements  - avoid Ace inhibitors/ARB for now     Azotemia, improving    - likely secondary to steroid use and LLUVIA/CKD, last dose of steroid 1/30/18  - now remains off steroid     - patient denies any uremic symptoms   Continue to monitor     Hypertensive urgency, now overall improved, last couple readings remains elevated  - currently remains on carvedilol, hydralazine, nifedipine  - continue current antihypertensive regimen  Continue p r n  hydralazine/labetalol  avoid low BP   - if blood pressure remains persistently greater than 150/90, may consider starting low-dose torsemide      Subacute CVA with Change in vision/blurry vision  - concern for demyelinating disease, lumbar puncture nonsignificant  - ongoing neurology follow-up  - EEG without seizure activity  - Patient had CTA and MRI with gadolinium on 1/22/18  - Avoid any further contrast going forward until renal function is improved  - status post high-dose steroid course and plasmapheresis       Mild hyperphosphatemia, on binders with each meal closely monitor with improving renal function  Vitamin-D deficiency, vitamin-D level 10 9 this admission, on vitamin-D 08431 units weekly      okay from Nephrology standpoint for discharge if BP remains acceptable  Will need outpatient nephrology follow-up and repeat BMP in one week  SUBJECTIVE:  Patient seen and examined at bedside  No chest pain, shortness of breath, nausea, vomiting, abdominal pain or diarrhea  No urinary complaints       OBJECTIVE:  Current Weight: Weight - Scale: 106 kg (233 lb 14 5 oz)  Vitals:    02/03/18 0700   BP: (!) 178/88   Pulse: 82   Resp: 18   Temp: 98 3 °F (36 8 °C)   SpO2: 100%       Intake/Output Summary (Last 24 hours) at 02/03/18 8088  Last data filed at 02/03/18 0630   Gross per 24 hour   Intake                0 ml   Output             3050 ml   Net            -3050 ml       Physical Examination:  General:  Lying in bed, no acute distress   Eyes:  Mild conjunctival pallor present  ENT:  External examination of ears and nose unremarkable  Neck:  Supple  Respiratory:  Bilateral air entry present, no wheezing present  CVS:  S1, S2 present  GI:  Soft, nontender, nondistended  CNS:  Active alert oriented x3  Extremities:  1+ edema in both lower extremities  Skin:  No rash in both legs    Medications:    Current Facility-Administered Medications:     acetaminophen (TYLENOL) tablet 650 mg, 650 mg, Oral, Q4H PRN, Justina Keene PA-C    albumin human (FLEXBUMIN) 5 % injection 175 g, 175 g, Intravenous, Every Other Day, DEISI Lucio, 175 g at 02/02/18 0852    atorvastatin (LIPITOR) tablet 40 mg, 40 mg, Oral, QPM, Justina Keene PA-C, 40 mg at 02/02/18 1740    carvedilol (COREG) tablet 25 mg, 25 mg, Oral, BID With Meals, DEISI Bone, 25 mg at 02/03/18 0631    clopidogrel (PLAVIX) tablet 75 mg, 75 mg, Oral, Daily, Thanh Bingham MD, 75 mg at 02/02/18 0840    diphenhydrAMINE (BENADRYL) tablet 25 mg, 25 mg, Oral, HS PRN, Thanh Bingham MD    docusate sodium (COLACE) capsule 100 mg, 100 mg, Oral, BID, Justina Keene PA-C, 100 mg at 02/02/18 0840    ergocalciferol (VITAMIN D2) capsule 50,000 Units, 50,000 Units, Oral, Weekly, Erika Green PA-C, 50,000 Units at 81/44/76 0917    folic acid (FOLVITE) tablet 1 mg, 1 mg, Oral, Daily, Filomena Mcdaniel PA-C, 1 mg at 02/02/18 0839    heparin (porcine) subcutaneous injection 5,000 Units, 5,000 Units, Subcutaneous, Q8H [address], Thanh Bingham MD, 5,000 Units at 02/03/18 0631    hydrALAZINE (APRESOLINE) injection 10 mg, 10 mg, Intravenous, Q6H PRN, [place] SAV CASIANO, 10 mg at 01/31/18 0413    hydrALAZINE (APRESOLINE) tablet 100 mg, 100 mg, Oral, TID, Maral Valentine MD, 100 mg at 02/02/18 2227    insulin glargine (LANTUS) subcutaneous injection 6 Units, 6 Units, Subcutaneous, Daily With Breakfast, Martha Welch MD    insulin glargine (LANTUS) subcutaneous injection 6 Units, 6 Units, Subcutaneous, HS, Martha Welch MD, 6 Units at 02/02/18 2230    insulin lispro (HumaLOG) 100 units/mL subcutaneous injection 1-6 Units, 1-6 Units, Subcutaneous, HS, Martha Welch MD, 5 Units at 02/02/18 2230    insulin lispro (HumaLOG) 100 units/mL subcutaneous injection 2-12 Units, 2-12 Units, Subcutaneous, TID AC, 4 Units at 02/02/18 1740 **AND** Fingerstick Glucose (POCT), , , TID AC, Daniela Tejeda MD    insulin lispro (HumaLOG) 100 units/mL subcutaneous injection 4 Units, 4 Units, Subcutaneous, TID With Meals, Samina Briscoe MD    labetalol (NORMODYNE) injection 10 mg, 10 mg, Intravenous, Q4H PRN, Justina Keene PA-C, 10 mg at 01/29/18 0407    LORazepam (ATIVAN) tablet 0 5 mg, 0 5 mg, Oral, Q8H PRN, Ryan Mancera MD    NIFEdipine (PROCARDIA XL) 24 hr tablet 30 mg, 30 mg, Oral, BID, Sohan Valle MD, 30 mg at 02/02/18 1958    ondansetron (ZOFRAN) injection 4 mg, 4 mg, Intravenous, Q6H PRN, Justina Keene PA-C, 4 mg at 01/29/18 0421    pantoprazole (PROTONIX) EC tablet 40 mg, 40 mg, Oral, Early Morning, Krysten Chance, CRNP, 40 mg at 02/03/18 0631    polyethylene glycol (MIRALAX) packet 17 g, 17 g, Oral, Daily, Boy MartaDO, 17 g at 01/31/18 1206    promethazine (PHENERGAN) injection 25 mg, 25 mg, Intravenous, Q6H PRN, Ryan Mancera MD, 25 mg at 01/29/18 0644    Laboratory Results:    Results from last 7 days  Lab Units 02/03/18  0452 02/02/18  0417 02/01/18  0410 01/31/18  0425 01/31/18  0423 01/31/18  0417 01/30/18  1001 01/29/18  0633 01/29/18  0625 01/28/18  0420   WBC Thousand/uL  --  9 58 9 76  --  12 72*  --  14 93*  --  18 65* 17 30*  16 63*   HEMOGLOBIN g/dL  --  8 9* 9 2*  --  9 0*  --  9 5*  --  9 1* 8 5*  8 5*   HEMATOCRIT %  --  26 3* 26 7*  --  26 3*  --  28 3*  --  26 5* 24 7*  24 6*   PLATELETS Thousands/uL  --  272 271  --  308  --  329  --  298 272  273   SODIUM mmol/L 141 140  138 142 140  --  138 137 139 139 137   POTASSIUM mmol/L 4 5 4 3  4 3 3 9 4 3  --  4 2 4 4 4 2 4 2 4 3   CHLORIDE mmol/L 108 108  107 109* 108  --  106 107 106 107 104   CO2 mmol/L 27 26  26 25 23  --  22 21 21 21 20*   BUN mg/dL 60* 66*  67* 67* 75*  --  73* 69* 71* 71* 70*   CREATININE mg/dL 3 41* 3 55*  3 50* 3 76* 4 43*  --  4 42* 4 77* 5 22* 5 24* 6 63*   CALCIUM mg/dL 7 9* 7 9*  7 8* 7 8* 8 4  --  8 3 8 2* 8 6 8 5 8 7   PHOSPHORUS mg/dL  --  4 2  --   --   --  5 2*  --   --  5 7*  --    GLUCOSE RANDOM mg/dL 181* 83  90 107 153*  --  151* 278* 80 80 165*       No results found for this or any previous visit  No results found for this or any previous visit  No results found for this or any previous visit  No results found for this or any previous visit  No results found for this or any previous visit  Results for orders placed during the hospital encounter of 10/28/15   US retroperitoneal complete    [facility] [id number]   EXAM STOP = [id number]   FILMS USED = N/A      RENAL ULTRASOUND      INDICATION- LLUVIA      COMPARISON- None  TECHNIQUE-   Ultrasound of the retroperitoneum was performed with a   curvilinear transducer utilizing volumetric sweeps and still imaging   techniques  FINDINGS-   KIDNEYS-   Symmetric and normal size  Right kidney-  11 6 x 6 9 cm  Normal echogenicity and contour  No suspicious masses detected  No hydronephrosis  No shadowing calculi  No perinephric fluid collections  Left kidney-  11 4 x 7 4 cm  Normal echogenicity and contour  No suspicious masses detected  No hydronephrosis  No shadowing calculi  No perinephric fluid collections  URETERS-   Nonvisualized  BLADDER-    Collapsed bladder, suboptimally evaluated  IMPRESSION-       Unremarkable kidneys  Limited evaluation of the bladder due to   underdistention         Transcribed on- MANI Reyes MD        Reading Radiologist- MANI Diop MD        Electronically Signed- MANI Diop MD        Released Date Time- 10/28/15 1822    ------------------------------------------------------------------------------   READ BY = [id number] CARPIO   RELEASED BY = [id number] CARPIO Portions of the record may have been created with voice recognition software  Occasional wrong word or "sound a like" substitutions may have occurred due to the inherent limitations of voice recognition software  Read the chart carefully and recognize, using context, where substitutions have occurred

## 2018-02-03 NOTE — PROGRESS NOTES
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   Active Problems:    Cerebrovascular accident (CVA) of left pontine structure ([facility] )    Acute renal failure superimposed on stage 3 chronic kidney disease ([facility] )    Hypertensive urgency    Type 1 diabetes mellitus with diabetic nephropathy, with long-term current use of insulin ([facility] )    History of lacunar cerebrovascular accident (CVA)    Hyperphosphatemia    Vitamin D deficiency    Azotemia      Plan:    1  Subacute CVA with visual disturbance, positive prothrombin gene mutation, Per Hematology no need for therapeutic anticoagulation,  per Neurology continue Plavix and statin  Outpatient Hematology and Neurology follow up,  DC HD catheter  2  LLUVIA /CKD stage III with proteinuria, improving,  secondary to ATN with contrast induced nephropathy,  nephrology is following  3  Hypertensive urgency, improving,  nephrology is following, currently on Coreg, nifedipine, and hydralazine  4  Diabetes mellitus type 1 with A1c 6 4, endocrine is following, currently on Lantus and Humalog   5  Vitamin-D deficiency, on vitamin-D supplement  6  Tobacco smoking, refused nicotine patch     Physical therapy recommending rehab   consult case management    VTE Pharmacologic Prophylaxis:   Pharmacologic: Heparin  Mechanical VTE Prophylaxis in Place: Yes    Patient Centered Rounds: I have performed bedside rounds with nursing staff today  Discussions with Specialists or Other Care Team Provider:     Education and Discussions with Family / Patient:  Patient and his  father    Time Spent for Care: 30 minutes  More than 50% of total time spent on counseling and coordination of care as described above      Current Length of Stay: 12 day(s)    Current Patient Status: Inpatient   Certification Statement: The patient will continue to require additional inpatient hospital stay due to Management of acute CVA    Discharge Plan / Estimated Discharge Date: not ready yet    Code Status: Level 1 - Full Code      Subjective:   Patient seen and examined  Comfortable in chair  Still with visual disturbances  No chest pain or shortness of breath  Father at bedside    Objective:     Vitals:   Temp (24hrs), Av 2 °F (36 8 °C), Min:97 9 °F (36 6 °C), Max:98 3 °F (36 8 °C)    HR:  [80-84] 82  Resp:  [16-18] 18  BP: (138-179)/(70-89) 178/88  SpO2:  [98 %-100 %] 100 %  Body mass index is 32 62 kg/m²  Input and Output Summary (last 24 hours): Intake/Output Summary (Last 24 hours) at 18 0800  Last data filed at 18 0630   Gross per 24 hour   Intake                0 ml   Output             2300 ml   Net            -2300 ml       Physical Exam:     Physical Exam  Patient is awake alert in no acute distress  Lung clear to auscultation bilateral  Heart positive S1-S2 no murmur  Abdomen soft nontender positive bowel sounds  Lower extremity edema    Additional Data:     Labs:      Results from last 7 days  Lab Units 187 18  0410   WBC Thousand/uL 9 58 9 76   HEMOGLOBIN g/dL 8 9* 9 2*   HEMATOCRIT % 26 3* 26 7*   PLATELETS Thousands/uL 272 271   NEUTROS PCT %  --  59   LYMPHS PCT %  --  28   LYMPHO PCT % 26  --    MONOS PCT %  --  9   MONO PCT MAN % 4  --    EOS PCT %  --  4   EOSINO PCT MANUAL % 4  --        Results from last 7 days  Lab Units 18  0452   SODIUM mmol/L 141   POTASSIUM mmol/L 4 5   CHLORIDE mmol/L 108   CO2 mmol/L 27   BUN mg/dL 60*   CREATININE mg/dL 3 41*   CALCIUM mg/dL 7 9*   GLUCOSE RANDOM mg/dL 181*       Results from last 7 days  Lab Units 18  0417   INR  1 04       * I Have Reviewed All Lab Data Listed Above  * Additional Pertinent Lab Tests Reviewed:  [facility]    Imaging:    Imaging Reports Reviewed Today Include:   Imaging Personally Reviewed by Myself Includes:      Recent Cultures (last 7 days):           Last 24 Hours Medication List:     Current Facility-Administered Medications:  acetaminophen 650 mg Oral Q4H PRN Justina eKene PA-C   albumin human 175 g Intravenous Every Other Day KrystenDEISI Mckeon   atorvastatin 40 mg Oral QPM Justina Keene PA-C   carvedilol 25 mg Oral BID With Meals DEISI Grant   clopidogrel 75 mg Oral Daily Taylor Strauss MD   diphenhydrAMINE 25 mg Oral HS PRN Taylor Strauss MD   docusate sodium 100 mg Oral BID Justina Keene PA-C   ergocalciferol 50,000 Units Oral Weekly Erika Green PA-C   folic acid 1 mg Oral Daily Filomena Mcdaniel PA-C   heparin (porcine) 5,000 Units Subcutaneous Q8H [address] Taylor Strauss MD   hydrALAZINE 10 mg Intravenous Q6H PRN Wilma Pennington PA-C   hydrALAZINE 100 mg Oral TID Connie Tucker MD   insulin glargine 6 Units Subcutaneous Daily With Breakfast Peg Jain MD   insulin glargine 6 Units Subcutaneous HS Peg Jain MD   insulin lispro 1-6 Units Subcutaneous HS Peg Jain MD   insulin lispro 2-12 Units Subcutaneous TID AC Peg Jain MD   insulin lispro 4 Units Subcutaneous TID With Meals Peg Jain MD   labetalol 10 mg Intravenous Q4H PRN Justina Keene PA-C   LORazepam 0 5 mg Oral Q8H PRN Taylor Strauss MD   NIFEdipine 30 mg Oral BID Connie Tucker MD   ondansetron 4 mg Intravenous Q6H PRN Justina Keene PA-C   pantoprazole 40 mg Oral Early Morning KrystenDEISI Mckeon   polyethylene glycol 17 g Oral Daily Ryann Bradshaw DO   promethazine 25 mg Intravenous Q6H PRN Taylor Strauss MD        Today, Patient Was Seen By: Ryann Bradshaw DO    ** Please Note: This note has been constructed using a voice recognition system   **

## 2018-02-04 LAB
ANION GAP SERPL CALCULATED.3IONS-SCNC: 7 MMOL/L (ref 4–13)
BUN SERPL-MCNC: 57 MG/DL (ref 5–25)
C3 SERPL-MCNC: 53.4 MG/DL (ref 90–180)
C4 SERPL-MCNC: 13 MG/DL (ref 10–40)
CALCIUM SERPL-MCNC: 8.2 MG/DL (ref 8.3–10.1)
CHLORIDE SERPL-SCNC: 108 MMOL/L (ref 100–108)
CO2 SERPL-SCNC: 24 MMOL/L (ref 21–32)
CREAT SERPL-MCNC: 3.25 MG/DL (ref 0.6–1.3)
GFR SERPL CREATININE-BSD FRML MDRD: 24 ML/MIN/1.73SQ M
GLUCOSE SERPL-MCNC: 123 MG/DL (ref 65–140)
GLUCOSE SERPL-MCNC: 129 MG/DL (ref 65–140)
GLUCOSE SERPL-MCNC: 151 MG/DL (ref 65–140)
GLUCOSE SERPL-MCNC: 153 MG/DL (ref 65–140)
GLUCOSE SERPL-MCNC: 219 MG/DL (ref 65–140)
GLUCOSE SERPL-MCNC: 304 MG/DL (ref 65–140)
POTASSIUM SERPL-SCNC: 5.2 MMOL/L (ref 3.5–5.3)
SODIUM SERPL-SCNC: 139 MMOL/L (ref 136–145)
VIT B6 SERPL-MCNC: 12.7 UG/L (ref 5.3–46.7)

## 2018-02-04 PROCEDURE — 99232 SBSQ HOSP IP/OBS MODERATE 35: CPT | Performed by: INTERNAL MEDICINE

## 2018-02-04 PROCEDURE — 82948 REAGENT STRIP/BLOOD GLUCOSE: CPT

## 2018-02-04 PROCEDURE — 86160 COMPLEMENT ANTIGEN: CPT | Performed by: INTERNAL MEDICINE

## 2018-02-04 PROCEDURE — 97116 GAIT TRAINING THERAPY: CPT

## 2018-02-04 PROCEDURE — 97112 NEUROMUSCULAR REEDUCATION: CPT

## 2018-02-04 PROCEDURE — 80048 BASIC METABOLIC PNL TOTAL CA: CPT | Performed by: INTERNAL MEDICINE

## 2018-02-04 RX ORDER — TORSEMIDE 20 MG/1
20 TABLET ORAL DAILY
Status: DISCONTINUED | OUTPATIENT
Start: 2018-02-04 | End: 2018-02-05 | Stop reason: HOSPADM

## 2018-02-04 RX ADMIN — INSULIN LISPRO 2 UNITS: 100 INJECTION, SOLUTION INTRAVENOUS; SUBCUTANEOUS at 21:49

## 2018-02-04 RX ADMIN — HYDRALAZINE HYDROCHLORIDE 100 MG: 25 TABLET, FILM COATED ORAL at 21:44

## 2018-02-04 RX ADMIN — INSULIN LISPRO 2 UNITS: 100 INJECTION, SOLUTION INTRAVENOUS; SUBCUTANEOUS at 10:29

## 2018-02-04 RX ADMIN — CARVEDILOL 25 MG: 12.5 TABLET, FILM COATED ORAL at 08:08

## 2018-02-04 RX ADMIN — ATORVASTATIN CALCIUM 40 MG: 40 TABLET, FILM COATED ORAL at 18:46

## 2018-02-04 RX ADMIN — NIFEDIPINE 30 MG: 30 TABLET, FILM COATED, EXTENDED RELEASE ORAL at 18:46

## 2018-02-04 RX ADMIN — NIFEDIPINE 30 MG: 30 TABLET, FILM COATED, EXTENDED RELEASE ORAL at 08:08

## 2018-02-04 RX ADMIN — HYDRALAZINE HYDROCHLORIDE 100 MG: 25 TABLET, FILM COATED ORAL at 16:47

## 2018-02-04 RX ADMIN — HEPARIN SODIUM 5000 UNITS: 5000 INJECTION, SOLUTION INTRAVENOUS; SUBCUTANEOUS at 21:44

## 2018-02-04 RX ADMIN — INSULIN LISPRO 8 UNITS: 100 INJECTION, SOLUTION INTRAVENOUS; SUBCUTANEOUS at 11:40

## 2018-02-04 RX ADMIN — FOLIC ACID 1 MG: 1 TABLET ORAL at 08:09

## 2018-02-04 RX ADMIN — CLOPIDOGREL BISULFATE 75 MG: 75 TABLET ORAL at 08:08

## 2018-02-04 RX ADMIN — INSULIN LISPRO 4 UNITS: 100 INJECTION, SOLUTION INTRAVENOUS; SUBCUTANEOUS at 11:40

## 2018-02-04 RX ADMIN — PANTOPRAZOLE SODIUM 40 MG: 40 TABLET, DELAYED RELEASE ORAL at 05:36

## 2018-02-04 RX ADMIN — INSULIN GLARGINE 6 UNITS: 100 INJECTION, SOLUTION SUBCUTANEOUS at 21:44

## 2018-02-04 RX ADMIN — TORSEMIDE 20 MG: 20 TABLET ORAL at 11:39

## 2018-02-04 RX ADMIN — HEPARIN SODIUM 5000 UNITS: 5000 INJECTION, SOLUTION INTRAVENOUS; SUBCUTANEOUS at 05:36

## 2018-02-04 RX ADMIN — INSULIN LISPRO 2 UNITS: 100 INJECTION, SOLUTION INTRAVENOUS; SUBCUTANEOUS at 16:49

## 2018-02-04 RX ADMIN — HYDRALAZINE HYDROCHLORIDE 100 MG: 25 TABLET, FILM COATED ORAL at 08:08

## 2018-02-04 RX ADMIN — CARVEDILOL 25 MG: 12.5 TABLET, FILM COATED ORAL at 18:46

## 2018-02-04 RX ADMIN — INSULIN LISPRO 4 UNITS: 100 INJECTION, SOLUTION INTRAVENOUS; SUBCUTANEOUS at 16:49

## 2018-02-04 RX ADMIN — ASPIRIN 81 MG 162 MG: 81 TABLET ORAL at 08:09

## 2018-02-04 RX ADMIN — INSULIN LISPRO 4 UNITS: 100 INJECTION, SOLUTION INTRAVENOUS; SUBCUTANEOUS at 10:29

## 2018-02-04 RX ADMIN — INSULIN GLARGINE 6 UNITS: 100 INJECTION, SOLUTION SUBCUTANEOUS at 08:08

## 2018-02-04 NOTE — PROGRESS NOTES
NEPHROLOGY PROGRESS NOTE   Marisa Mckeon 27 y o  male MRN: [id number]  Unit/Bed#: -01 Encounter: [id number]  Reason for Consult: LLUVIA/CKD    ASSESSMENT AND PLAN:  Patient is a 68-year-old male with type 1 diabetes since age of 1, hypertension, history of CVA, hyperlipidemia, CKD stage IIIB with baseline serum creatinine 2 2 to 2 3, presented with change in vision and found to have hypertensive urgency  We are consulted for Lluvia/CKd      Nonoliguric LLUVIA (POA) on CKD stage IIIB, baseline serum creatinine 2 2 to 2 3 in 2017, follows with Dr Marianna Stuart  - LLUVIA likely secondary to ATN + contrast induced nephropathy (creatinine worsened after contrast exposure although patient already had LLUVIA before getting contrast) + use of ACE-inhibitor, peak creatinine 8 3 continue to improve to 3 2 today  - urine output good  - avoid nephrotoxins, monitor intake and output  - renal ultrasound showed normal size kidneys, no hydro  - restart diuretics as below  - initial urinalysis showed 1 to 2 RBCs, greater than 3+ proteinuria     CKD likely secondary to long-term diabetes and hypertension     Nephrotic range proteinuria  - urine microalbumin/creatinine ratio 9 2 g this is not in a steady state  - last hemoglobin A1c 6 4 this admission although previously uncontrolled diabetes  - Will need repeat UPC ratio and preferably 24 hour urine collection for urine protein and creatinine once renal function is stable and improved for better assessment and can re-evaluate at that point for any need for renal biopsy  - urinalysis shows no significant hematuria  - SPEP and UPEP negative, Anca negative, CARMEN negative, rheumatoid factor negative, check complements  - avoid Ace inhibitors/ARB for now     Azotemia, improving    - likely secondary to steroid use and LLUVIA/CKD, last dose of steroid 1/30/18  - now remains off steroid  - patient denies any uremic symptoms   Continue to monitor    Hyperkalemia: Strict K restricted diet    Start torsemide as below      Hypertensive urgency, now overall improved, last couple readings remains elevated  - currently remains on carvedilol, hydralazine, nifedipine  - continue current antihypertensive regimen  Continue p r n  hydralazine/labetalol    avoid low BP  - will start torsemide 20 mg p o  Daily     Subacute CVA with Change in vision/blurry vision  - concern for demyelinating disease, lumbar puncture nonsignificant  - ongoing neurology follow-up  - EEG without seizure activity  - Patient had CTA and MRI with gadolinium on 1/22/18  - Avoid any further contrast going forward until renal function is improved  - status post high-dose steroid course and plasmapheresis       Mild hyperphosphatemia, on binders with each meal closely monitor with improving renal function, check serum phosphorus in a m  if patient is still here  Vitamin-D deficiency, vitamin-D level 10 9 this admission, on vitamin-D 61024 units weekly      okay from Nephrology standpoint for discharge  Will need outpatient nephrology follow-up and repeat BMP in one week  Discussed with Dr Terry Bees:  Patient seen and examined at bedside  No chest pain, shortness of breath, nausea, vomiting, abdominal pain or diarrhea  No urinary complaints       OBJECTIVE:  Current Weight: Weight - Scale: 107 kg (236 lb 8 9 oz)  Vitals:    02/04/18 0659   BP: 167/83   Pulse: 80   Resp: 18   Temp: 98 3 °F (36 8 °C)   SpO2: 97%       Intake/Output Summary (Last 24 hours) at 02/04/18 0743  Last data filed at 02/04/18 0980   Gross per 24 hour   Intake              180 ml   Output             1500 ml   Net            -1320 ml       Physical Examination:  General:  Lying in bed, no acute distress   Eyes:  Mild conjunctival pallor present  ENT:  External examination of ears and nose unremarkable  Neck:  Supple  Respiratory:  Bilateral air entry present  CVS:  S1, S2 present  GI:  Soft, nontender, nondistended  CNS:  Active alert oriented x3  Extremities:  1+ edema in lower extremities  Skin:  No new changes    Medications:    Current Facility-Administered Medications:     acetaminophen (TYLENOL) tablet 650 mg, 650 mg, Oral, Q4H PRN, Justina Keene PA-C    albumin human (FLEXBUMIN) 5 % injection 175 g, 175 g, Intravenous, Every Other Day, KrystenDEISI Saucedo, 175 g at 02/02/18 8848    aspirin chewable tablet 162 mg, 162 mg, Oral, Daily, DEISI Reynolds    atorvastatin (LIPITOR) tablet 40 mg, 40 mg, Oral, QPM, Justina Keene PA-C, 40 mg at 02/03/18 1834    carvedilol (COREG) tablet 25 mg, 25 mg, Oral, BID With Meals, DEISI Yusuf, 25 mg at 02/03/18 1639    clopidogrel (PLAVIX) tablet 75 mg, 75 mg, Oral, Daily, Vania Rascon MD, 75 mg at 02/03/18 2860    diphenhydrAMINE (BENADRYL) tablet 25 mg, 25 mg, Oral, HS PRN, Vania Rascon MD    docusate sodium (COLACE) capsule 100 mg, 100 mg, Oral, BID, Justina Keene PA-C, 100 mg at 02/03/18 1835    ergocalciferol (VITAMIN D2) capsule 50,000 Units, 50,000 Units, Oral, Weekly, Erika Green PA-C, 50,000 Units at 76/32/71 5447    folic acid (FOLVITE) tablet 1 mg, 1 mg, Oral, Daily, Filomena Mcdaniel PA-C, 1 mg at 02/03/18 0813    heparin (porcine) subcutaneous injection 5,000 Units, 5,000 Units, Subcutaneous, Q8H [facility], Vania Rascon MD, 5,000 Units at 02/04/18 0536    hydrALAZINE (APRESOLINE) injection 10 mg, 10 mg, Intravenous, Q6H PRN, [facility], 10 mg at 01/31/18 0413    hydrALAZINE (APRESOLINE) tablet 100 mg, 100 mg, Oral, TID, Ochoa Woods MD, 100 mg at 02/03/18 2204    insulin glargine (LANTUS) subcutaneous injection 6 Units, 6 Units, Subcutaneous, Daily With Breakfast, Quita Nageotte, MD, 6 Units at 02/03/18 0815    insulin glargine (LANTUS) subcutaneous injection 6 Units, 6 Units, Subcutaneous, HS, Quita Nageotte, MD, 6 Units at 02/03/18 2204    insulin lispro (HumaLOG) 100 units/mL subcutaneous injection 1-6 Units, 1-6 Units, Subcutaneous, HS, Demarco Andrade MD, 2 Units at 02/03/18 2207    insulin lispro (HumaLOG) 100 units/mL subcutaneous injection 2-12 Units, 2-12 Units, Subcutaneous, TID AC, 8 Units at 02/03/18 1643 **AND** Fingerstick Glucose (POCT), , , TID AC, Demarco Andrade MD    insulin lispro (HumaLOG) 100 units/mL subcutaneous injection 4 Units, 4 Units, Subcutaneous, TID With Meals, Demarco Andrade MD, 4 Units at 02/03/18 1644    labetalol (NORMODYNE) injection 10 mg, 10 mg, Intravenous, Q4H PRN, Justina Keene PA-C, 10 mg at 01/29/18 0407    LORazepam (ATIVAN) tablet 0 5 mg, 0 5 mg, Oral, Q8H PRN, Mark Banerjee MD    NIFEdipine (PROCARDIA XL) 24 hr tablet 30 mg, 30 mg, Oral, BID, Lalitha Aguillon MD, 30 mg at 02/03/18 1834    ondansetron (ZOFRAN) injection 4 mg, 4 mg, Intravenous, Q6H PRN, Justina Keene PA-C, 4 mg at 01/29/18 0421    pantoprazole (PROTONIX) EC tablet 40 mg, 40 mg, Oral, Early Morning, DEISI Lucio, 40 mg at 02/04/18 0536    polyethylene glycol (MIRALAX) packet 17 g, 17 g, Oral, Daily, Fernando Flowers, DO, 17 g at 01/31/18 1206    promethazine (PHENERGAN) injection 25 mg, 25 mg, Intravenous, Q6H PRN, Mark Banerjee MD, 25 mg at 01/29/18 0644    Laboratory Results:    Results from last 7 days  Lab Units 02/04/18  0602 02/03/18  0452 02/02/18  0417 02/01/18  0410 01/31/18  0425 01/31/18  0423 01/31/18  0417 01/30/18  1001  01/29/18  0625   WBC Thousand/uL  --   --  9 58 9 76  --  12 72*  --  14 93*  --  18 65*   HEMOGLOBIN g/dL  --   --  8 9* 9 2*  --  9 0*  --  9 5*  --  9 1*   HEMATOCRIT %  --   --  26 3* 26 7*  --  26 3*  --  28 3*  --  26 5*   PLATELETS Thousands/uL  --   --  272 271  --  308  --  329  --  298   SODIUM mmol/L 139 141 140  138 142 140  --  138 137  < > 139   POTASSIUM mmol/L 5 2 4 5 4 3  4 3 3 9 4 3  --  4 2 4 4  < > 4 2   CHLORIDE mmol/L 108 108 108  107 109* 108  --  106 107  < > 107   CO2 mmol/L 24 27 26  26 25 23  --  22 21  < > 21   BUN mg/dL 57* 60* 66*  67* 67* 75*  --  73* 69*  < > 71*   CREATININE mg/dL 3 25* 3 41* 3 55*  3 50* 3 76* 4 43*  --  4 42* 4 77*  < > 5 24*   CALCIUM mg/dL 8 2* 7 9* 7 9*  7 8* 7 8* 8 4  --  8 3 8 2*  < > 8 5   PHOSPHORUS mg/dL  --   --  4 2  --   --   --  5 2*  --   --  5 7*   GLUCOSE RANDOM mg/dL 123 181* 83  90 107 153*  --  151* 278*  < > 80   < > = values in this interval not displayed  No results found for this or any previous visit  No results found for this or any previous visit  No results found for this or any previous visit  No results found for this or any previous visit  No results found for this or any previous visit  Results for orders placed during the hospital encounter of 10/28/15   US retroperitoneal complete    Narrative EXAM ROOM = [facility] START = [id number]   EXAM STOP = [id number]   FILMS USED = N/A      RENAL ULTRASOUND      INDICATION- LLUVIA      COMPARISON- None  TECHNIQUE-   Ultrasound of the retroperitoneum was performed with a   curvilinear transducer utilizing volumetric sweeps and still imaging   techniques  FINDINGS-   KIDNEYS-   Symmetric and normal size  Right kidney-  11 6 x 6 9 cm  Normal echogenicity and contour  No suspicious masses detected  No hydronephrosis  No shadowing calculi  No perinephric fluid collections  Left kidney-  11 4 x 7 4 cm  Normal echogenicity and contour  No suspicious masses detected  No hydronephrosis  No shadowing calculi  No perinephric fluid collections  URETERS-   Nonvisualized  BLADDER-    Collapsed bladder, suboptimally evaluated  IMPRESSION-       Unremarkable kidneys  Limited evaluation of the bladder due to   underdistention         Transcribed on- MANI Lamas MD        Reading Radiologist- MANI Tamez MD        Electronically Signed- MANI Tamez MD        Released Date Time- 10/28/15 0005 ------------------------------------------------------------------------------   READ BY = [id number]^DEMAR CARPIO   RELEASED BY = [id number]^DEMAR CARPIO        Portions of the record may have been created with voice recognition software  Occasional wrong word or "sound a like" substitutions may have occurred due to the inherent limitations of voice recognition software  Read the chart carefully and recognize, using context, where substitutions have occurred

## 2018-02-04 NOTE — PLAN OF CARE
Problem: PHYSICAL THERAPY ADULT  Goal: Performs mobility at highest level of function for planned discharge setting  See evaluation for individualized goals  Treatment/Interventions: Functional transfer training, LE strengthening/ROM, Elevations, Therapeutic exercise, Equipment eval/education, Bed mobility, Gait training, Patient/family training, Cognitive reorientation, Endurance training, Spoke to nursing  Equipment Recommended: Other (Comment) (cane TBD)       See flowsheet documentation for full assessment, interventions and recommendations  Outcome: Progressing  Prognosis: Good  Problem List: Decreased strength, Decreased endurance, Impaired balance, Decreased mobility, Impaired vision, Decreased coordination, Obesity  Assessment: pt continues to progress w/ increased ambualtion distance  pt continues to be challenged during dynamic tasks and movements not incorporating UEs  pt continues to be at risk for falling  pt continues to require > 13 seconds to perform TUG, indicating fall risk  further inpatient PT intervention is needed to improve functional tolerance  discharge recommendation is for inpatient rehab to faciltitate return to independent mobility and function  Barriers to Discharge: Inaccessible home environment (NIRU And stairs inside of home)     Recommendation: Post acute IP rehab          See flowsheet documentation for full assessment

## 2018-02-04 NOTE — PROGRESS NOTES
Progress Note - Adilene Mckeon 27 y o  male MRN: [id number]    Unit/Bed#: -01 Encounter: [id number]      CC: diabetes f/u    Subjective: Chalice Bloch is a 27y o  year old male with type 1 diabetes  Feels well  No complaints  No hypoglycemia  Objective:     Vitals: Blood pressure 167/83, pulse 80, temperature 98 3 °F (36 8 °C), temperature source Oral, resp  rate 18, height 5' 10" (1 778 m), weight 107 kg (236 lb 8 9 oz), SpO2 97 %  ,Body mass index is 33 94 kg/m²  Intake/Output Summary (Last 24 hours) at 02/04/18 1030  Last data filed at 02/04/18 0218   Gross per 24 hour   Intake              180 ml   Output             1200 ml   Net            -1020 ml       Physical Exam:  General Appearance: awake, appears stated age and cooperative  Head: Normocephalic, without obvious abnormality, atraumatic  Extremities: moves all extremities  Skin: Skin color and temperature normal    Pulm: no labored breathing    Lab, Imaging and other studies: I have personally reviewed pertinent reports  POC Glucose (mg/dl)   Date Value   02/04/2018 153 (H)   02/04/2018 129   02/03/2018 213 (H)   02/03/2018 304 (H)   02/03/2018 375 (H)   02/03/2018 197 (H)   02/02/2018 342 (H)   02/02/2018 240 (H)   02/02/2018 181 (H)   02/02/2018 176 (H)       Assessment:  Type 1 diabetes with hyperglycemia, CVA and 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

## 2018-02-04 NOTE — PHYSICAL THERAPY NOTE
PHYSICAL THERAPY TREATMENT NOTE    Patient Name: Brain Whyte  YVHMP'N Date: 2/4/2018 02/04/18 1245   Pain Assessment   Pain Assessment No/denies pain   Restrictions/Precautions   Other Precautions Fall Risk   General   Chart Reviewed Yes   Family/Caregiver Present No   Cognition   Overall Cognitive Status [facility]   Arousal/Participation Alert; Cooperative   Attention Within functional limits   Following Commands Follows one step commands without difficulty   Subjective   Subjective pt seen sitting in chair  agreed to PT intervention  denied pain or dizziness  reports feeling continued off balance  Bed Mobility   Additional Comments time up and go performed twice: 21 3 seconds, 18 6 seconds  Transfers   Sit to Stand 5  Supervision   Stand to Sit 5  Supervision   Additional Comments during ambulation: supervision w/ nods and changes in velocity  minx1 w/ head turns, narrow base of support, eyes closed, and backwards ambulation  Ambulation/Elevation   Gait pattern Wide COLBY; Ataxia; Inconsistent kailee   Gait Assistance 5  Supervision   Distance 200 feet x4 w/ rest breaks x 1 to 2 minutes each  (additional not possible due to fatigue)   Stair Management Assistance 5  Supervision  (w/ single railing use  modx1 w/o railing)   Additional items Assist x 1;Verbal cues  (for railing use)   Stair Management Technique (4 steps w/ single railing, 4 steps w/o railing)   Number of Stairs 4  (x2)   Balance   Static Sitting Good   Dynamic Sitting Fair   Static Standing Fair   Dynamic Standing Fair -   Ambulatory Fair -   Activity Tolerance   Activity Tolerance Patient limited by fatigue   Nurse Made Aware spoke to NSG   Exercises   Balance training  sit to stands: 5x w/ single UE, 5x w/o UEs (w/ minx1), 5x w/ eyes closed and w/o UEs (w/ minx1)  side steps 20 feet x2 (w/ minx1)     Assessment   Prognosis Good   Problem List Decreased strength;Decreased endurance; Impaired balance;Decreased mobility; Impaired vision;Decreased coordination;Obesity   Assessment pt continues to progress w/ increased ambualtion distance  pt continues to be challenged during dynamic tasks and movements not incorporating UEs  pt continues to be at risk for falling  pt continues to require > 13 seconds to perform TUG, indicating fall risk  further inpatient PT intervention is needed to improve functional tolerance  discharge recommendation is for inpatient rehab to faciltitate return to independent mobility and function  Goals   Patient Goals get better   [facility] Expiration Date 02/09/18   Treatment Day 2   Plan   Treatment/Interventions Functional transfer training;LE strengthening/ROM; Elevations; Therapeutic exercise; Endurance training;Patient/family training;Bed mobility;Gait training   Progress Progressing toward goals   PT Frequency 5x/wk; Other (Comment)  (and 1x on weekend)   Recommendation   Recommendation Post acute IP rehab     Skilled inpatient PT recommended while in hospital to progress pt toward treatment goals      Marilin Palomino , PT

## 2018-02-04 NOTE — PROGRESS NOTES
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   Active Problems:    Cerebrovascular accident (CVA) of left pontine structure ([facility] [address] )    Acute renal failure superimposed on stage 3 [facility] )    Hypertensive urgency    Type 1 diabetes mellitus with diabetic nephropathy, with long-term current use of insulin ([facility] )    History of lacunar cerebrovascular accident (CVA)    Hyperphosphatemia    Vitamin D deficiency    Azotemia      Plan:    1  Subacute CVA with visual disturbance, positive prothrombin gene mutation, Per Hematology no need for therapeutic anticoagulation,  per Neurology continue Plavix and statin  Outpatient Hematology and Neurology follow up,   2  LLUVIA /CKD stage III with proteinuria, improving,  secondary to ATN with contrast induced nephropathy,  nephrology is following, started on Demadex  3  Hypertensive urgency, improving,  nephrology is following, currently on Coreg, nifedipine, and hydralazine  4  Diabetes mellitus type 1 with A1c 6 4, blood sugar acceptable,  endocrine is following, currently on Lantus and Humalog   5  Vitamin-D deficiency, on vitamin-D supplement  6  Tobacco smoking, refused nicotine patch     PT recommending rehab   case management is following    VTE Pharmacologic Prophylaxis:   Pharmacologic: Heparin  Mechanical VTE Prophylaxis in Place: Yes    Patient Centered Rounds: I have performed bedside rounds with nursing staff today  Discussions with Specialists or Other Care Team Provider:  Nephrology    Education and Discussions with Family / Patient:  Patient and his  mother    Time Spent for Care: 30 minutes    More than 50% of total time spent on counseling and coordination of care as described above     Current Length of Stay: 13 day(s)    Current Patient Status: Inpatient   Certification Statement: The patient will continue to require additional inpatient hospital stay due to Management of acute CVA    Discharge Plan / Estimated Discharge Date:  Awaiting rehab placement    Code Status: Level 1 - Full Code      Subjective:   Patient seen and examined  Comfortable in bed  No chest pain or shortness of breath  Mother at bedside    Objective:     Vitals:   Temp (24hrs), Av 2 °F (36 8 °C), Min:97 9 °F (36 6 °C), Max:98 5 °F (36 9 °C)    HR:  [72-89] 80  Resp:  [16-18] 18  BP: (156-176)/(78-92) 167/83  SpO2:  [97 %-100 %] 97 %  Body mass index is 33 94 kg/m²  Input and Output Summary (last 24 hours): Intake/Output Summary (Last 24 hours) at 18 0824  Last data filed at 18 0218   Gross per 24 hour   Intake              180 ml   Output             1200 ml   Net            -1020 ml       Physical Exam:     Physical Exam  Patient is awake alert in no acute distress  Lung clear to auscultation bilateral  Heart positive S1-S2 no murmur  Abdomen soft nontender positive bowel sounds  Lower extremity 2+ pitting edema    Additional Data:     Labs:      Results from last 7 days  Lab Units 18  0417 18  0410   WBC Thousand/uL 9 58 9 76   HEMOGLOBIN g/dL 8 9* 9 2*   HEMATOCRIT % 26 3* 26 7*   PLATELETS Thousands/uL 272 271   NEUTROS PCT %  --  59   LYMPHS PCT %  --  28   LYMPHO PCT % 26  --    MONOS PCT %  --  9   MONO PCT MAN % 4  --    EOS PCT %  --  4   EOSINO PCT MANUAL % 4  --        Results from last 7 days  Lab Units 18  0602   SODIUM mmol/L 139   POTASSIUM mmol/L 5 2   CHLORIDE mmol/L 108   CO2 mmol/L 24   BUN mg/dL 57*   CREATININE mg/dL 3 25*   CALCIUM mg/dL 8 2*   GLUCOSE RANDOM mg/dL 123       Results from last 7 days  Lab Units 18  0417   INR  1 04       * I Have Reviewed All Lab Data Listed Above  * Additional Pertinent Lab Tests Reviewed:  All Labs For Current Hospital Admission Reviewed    Imaging:    Imaging Reports Reviewed Today Include:   Imaging Personally Reviewed by Myself Includes:      Recent Cultures (last 7 days):           Last 24 Hours Medication List:     Current Facility-Administered Medications:  acetaminophen 650 mg Oral Q4H PRN Justina Keene PA-C   albumin human 175 g Intravenous Every Other Day KrystenDEISI Saucedo   aspirin 162 mg Oral Daily Ashley JerseyDEISI maharaj   atorvastatin 40 mg Oral QPM Justina Keene PA-C   carvedilol 25 mg Oral BID With Meals DEISI Mills   clopidogrel 75 mg Oral Daily Yared Lara MD   diphenhydrAMINE 25 mg Oral HS PRN Yared Lara MD   docusate sodium 100 mg Oral BID Justina Keene PA-C   ergocalciferol 50,000 Units Oral Weekly Erika Green PA-C   folic acid 1 mg Oral Daily Filomena Mcdaniel PA-C   heparin (porcine) 5,000 Units Subcutaneous Q8H [address]  Yared Lara MD   hydrALAZINE 10 mg Intravenous Q6H PRN Wilma Pennington PA-C   hydrALAZINE 100 mg Oral TID Abbie Shaw MD   insulin glargine 6 Units Subcutaneous Daily With Breakfast Digna Cox MD   insulin glargine 6 Units Subcutaneous HS Digna Cox MD   insulin lispro 1-6 Units Subcutaneous HS Digna Cox MD   insulin lispro 2-12 Units Subcutaneous TID AC Digna Cox MD   insulin lispro 4 Units Subcutaneous TID With Meals Digna Cox MD   labetalol 10 mg Intravenous Q4H PRN Justina Keene PA-C   LORazepam 0 5 mg Oral Q8H PRN Yared Lara MD   NIFEdipine 30 mg Oral BID Abbie Shaw MD   ondansetron 4 mg Intravenous Q6H PRN Justina Keene PA-C   pantoprazole 40 mg Oral Early Morning DEISI Lucio   polyethylene glycol 17 g Oral Daily Gracia Hernandez DO   promethazine 25 mg Intravenous Q6H PRN Yared Lara MD   torsemide 20 mg Oral Daily Abbie Shaw MD        Today, Patient Was Seen By: Gracia Hernandez DO    ** Please Note: This note has been constructed using a voice recognition system   **

## 2018-02-04 NOTE — PLAN OF CARE
Activity Intolerance/Impaired Mobility     Mobility/activity is maintained at optimum level for patient [place]     Discharge to home or other facility with appropriate resources Progressing        DISCHARGE PLANNING - CARE MANAGEMENT     Discharge to post-acute care or home with appropriate resources Progressing        INFECTION - ADULT     Absence or prevention of progression during hospitalization Progressing     Absence of fever/infection during neutropenic period Progressing        Knowledge Deficit     Patient/family/caregiver demonstrates understanding of disease process, treatment plan, medications, and discharge instructions Progressing        Neurological Deficit     Neurological status is stable or improving Progressing        Nutrition/Hydration-ADULT     Nutrient/Hydration intake appropriate for improving, restoring or maintaining nutritional needs Progressing        PAIN - ADULT     Verbalizes/displays adequate comfort level or baseline comfort level Progressing        Potential for Falls     Patient will remain free of falls Progressing        SAFETY ADULT     Patient will remain free of falls Progressing     Maintain or return to baseline ADL function Progressing     Maintain or return mobility status to optimal level Progressing

## 2018-02-05 VITALS
WEIGHT: 238.1 LBS | BODY MASS INDEX: 34.09 KG/M2 | HEART RATE: 80 BPM | HEIGHT: 70 IN | RESPIRATION RATE: 16 BRPM | TEMPERATURE: 98.6 F | OXYGEN SATURATION: 96 % | SYSTOLIC BLOOD PRESSURE: 146 MMHG | DIASTOLIC BLOOD PRESSURE: 68 MMHG

## 2018-02-05 PROBLEM — E83.39 HYPERPHOSPHATEMIA: Status: RESOLVED | Noted: 2018-01-29 | Resolved: 2018-02-05

## 2018-02-05 PROBLEM — E72.11 HYPERHOMOCYSTEINEMIA (HCC): Status: ACTIVE | Noted: 2018-02-05

## 2018-02-05 PROBLEM — R79.89 AZOTEMIA: Status: RESOLVED | Noted: 2018-01-29 | Resolved: 2018-02-05

## 2018-02-05 LAB
ANION GAP SERPL CALCULATED.3IONS-SCNC: 7 MMOL/L (ref 4–13)
BUN SERPL-MCNC: 54 MG/DL (ref 5–25)
CALCIUM SERPL-MCNC: 8.3 MG/DL (ref 8.3–10.1)
CHLORIDE SERPL-SCNC: 108 MMOL/L (ref 100–108)
CO2 SERPL-SCNC: 27 MMOL/L (ref 21–32)
CREAT SERPL-MCNC: 3.39 MG/DL (ref 0.6–1.3)
GFR SERPL CREATININE-BSD FRML MDRD: 22 ML/MIN/1.73SQ M
GLUCOSE SERPL-MCNC: 130 MG/DL (ref 65–140)
GLUCOSE SERPL-MCNC: 174 MG/DL (ref 65–140)
GLUCOSE SERPL-MCNC: 295 MG/DL (ref 65–140)
PHOSPHATE SERPL-MCNC: 4 MG/DL (ref 2.7–4.5)
POTASSIUM SERPL-SCNC: 4.8 MMOL/L (ref 3.5–5.3)
SODIUM SERPL-SCNC: 142 MMOL/L (ref 136–145)

## 2018-02-05 PROCEDURE — 97168 OT RE-EVAL EST PLAN CARE: CPT

## 2018-02-05 PROCEDURE — 82948 REAGENT STRIP/BLOOD GLUCOSE: CPT

## 2018-02-05 PROCEDURE — 99239 HOSP IP/OBS DSCHRG MGMT >30: CPT | Performed by: INTERNAL MEDICINE

## 2018-02-05 PROCEDURE — G8988 SELF CARE GOAL STATUS: HCPCS

## 2018-02-05 PROCEDURE — 80048 BASIC METABOLIC PNL TOTAL CA: CPT | Performed by: INTERNAL MEDICINE

## 2018-02-05 PROCEDURE — 90686 IIV4 VACC NO PRSV 0.5 ML IM: CPT | Performed by: INTERNAL MEDICINE

## 2018-02-05 PROCEDURE — 99232 SBSQ HOSP IP/OBS MODERATE 35: CPT | Performed by: INTERNAL MEDICINE

## 2018-02-05 PROCEDURE — G8987 SELF CARE CURRENT STATUS: HCPCS

## 2018-02-05 PROCEDURE — 84100 ASSAY OF PHOSPHORUS: CPT | Performed by: INTERNAL MEDICINE

## 2018-02-05 RX ORDER — ESCITALOPRAM OXALATE 10 MG/1
10 TABLET ORAL DAILY
Qty: 30 TABLET | Refills: 0 | Status: SHIPPED | OUTPATIENT
Start: 2018-02-05 | End: 2018-03-12 | Stop reason: SDUPTHER

## 2018-02-05 RX ORDER — FOLIC ACID 1 MG/1
1 TABLET ORAL DAILY
Qty: 30 TABLET | Refills: 0 | Status: SHIPPED | OUTPATIENT
Start: 2018-02-06 | End: 2018-02-05

## 2018-02-05 RX ORDER — CLOPIDOGREL BISULFATE 75 MG/1
75 TABLET ORAL DAILY
Qty: 30 TABLET | Refills: 0 | Status: SHIPPED | OUTPATIENT
Start: 2018-02-06 | End: 2018-03-12 | Stop reason: SDUPTHER

## 2018-02-05 RX ORDER — FOLIC ACID 1 MG/1
1 TABLET ORAL DAILY
Qty: 30 TABLET | Refills: 0 | Status: SHIPPED | OUTPATIENT
Start: 2018-02-06 | End: 2018-03-12 | Stop reason: SDUPTHER

## 2018-02-05 RX ORDER — CLOPIDOGREL BISULFATE 75 MG/1
75 TABLET ORAL DAILY
Qty: 30 TABLET | Refills: 0 | Status: SHIPPED | OUTPATIENT
Start: 2018-02-06 | End: 2018-02-05

## 2018-02-05 RX ORDER — ERGOCALCIFEROL 1.25 MG/1
50000 CAPSULE ORAL WEEKLY
Qty: 4 CAPSULE | Refills: 0 | Status: SHIPPED | OUTPATIENT
Start: 2018-02-09 | End: 2018-02-26 | Stop reason: SDUPTHER

## 2018-02-05 RX ORDER — ASPIRIN 81 MG/1
162 TABLET, CHEWABLE ORAL DAILY
Qty: 30 TABLET | Refills: 0 | Status: SHIPPED | OUTPATIENT
Start: 2018-02-06 | End: 2018-02-05

## 2018-02-05 RX ORDER — TORSEMIDE 20 MG/1
20 TABLET ORAL DAILY
Qty: 30 TABLET | Refills: 0 | Status: SHIPPED | OUTPATIENT
Start: 2018-02-06 | End: 2018-02-05

## 2018-02-05 RX ORDER — HYDRALAZINE HYDROCHLORIDE 100 MG/1
100 TABLET, FILM COATED ORAL 3 TIMES DAILY
Qty: 90 TABLET | Refills: 0 | Status: SHIPPED | OUTPATIENT
Start: 2018-02-05 | End: 2018-02-05

## 2018-02-05 RX ORDER — ATORVASTATIN CALCIUM 40 MG/1
40 TABLET, FILM COATED ORAL EVERY EVENING
Qty: 30 TABLET | Refills: 0 | Status: SHIPPED | OUTPATIENT
Start: 2018-02-05 | End: 2018-03-12 | Stop reason: SDUPTHER

## 2018-02-05 RX ORDER — HYDRALAZINE HYDROCHLORIDE 100 MG/1
100 TABLET, FILM COATED ORAL 3 TIMES DAILY
Qty: 90 TABLET | Refills: 0 | Status: SHIPPED | OUTPATIENT
Start: 2018-02-05 | End: 2018-02-22 | Stop reason: SDUPTHER

## 2018-02-05 RX ORDER — CARVEDILOL 25 MG/1
25 TABLET ORAL 2 TIMES DAILY WITH MEALS
Qty: 60 TABLET | Refills: 0 | Status: SHIPPED | OUTPATIENT
Start: 2018-02-05 | End: 2018-02-05

## 2018-02-05 RX ORDER — ESCITALOPRAM OXALATE 10 MG/1
10 TABLET ORAL DAILY
Status: DISCONTINUED | OUTPATIENT
Start: 2018-02-05 | End: 2018-02-05 | Stop reason: HOSPADM

## 2018-02-05 RX ORDER — INSULIN GLARGINE 100 [IU]/ML
6 INJECTION, SOLUTION SUBCUTANEOUS
Qty: 10 ML | Refills: 0 | Status: SHIPPED | OUTPATIENT
Start: 2018-02-06 | End: 2018-02-05

## 2018-02-05 RX ORDER — TORSEMIDE 20 MG/1
20 TABLET ORAL DAILY
Qty: 30 TABLET | Refills: 0 | Status: SHIPPED | OUTPATIENT
Start: 2018-02-06 | End: 2018-02-19 | Stop reason: HOSPADM

## 2018-02-05 RX ORDER — NIFEDIPINE 30 MG/1
30 TABLET, FILM COATED, EXTENDED RELEASE ORAL 2 TIMES DAILY
Qty: 60 TABLET | Refills: 0 | Status: SHIPPED | OUTPATIENT
Start: 2018-02-05 | End: 2018-02-05

## 2018-02-05 RX ORDER — NIFEDIPINE 30 MG/1
30 TABLET, FILM COATED, EXTENDED RELEASE ORAL 2 TIMES DAILY
Qty: 60 TABLET | Refills: 0 | Status: SHIPPED | OUTPATIENT
Start: 2018-02-05 | End: 2018-02-10 | Stop reason: ALTCHOICE

## 2018-02-05 RX ORDER — INSULIN GLARGINE 100 [IU]/ML
6 INJECTION, SOLUTION SUBCUTANEOUS
Qty: 10 ML | Refills: 0 | Status: SHIPPED | OUTPATIENT
Start: 2018-02-05 | End: 2018-02-19 | Stop reason: HOSPADM

## 2018-02-05 RX ORDER — CARVEDILOL 25 MG/1
25 TABLET ORAL 2 TIMES DAILY WITH MEALS
Qty: 60 TABLET | Refills: 0 | Status: SHIPPED | OUTPATIENT
Start: 2018-02-05 | End: 2018-02-19 | Stop reason: HOSPADM

## 2018-02-05 RX ORDER — ERGOCALCIFEROL 1.25 MG/1
50000 CAPSULE ORAL WEEKLY
Qty: 4 CAPSULE | Refills: 0 | Status: SHIPPED | OUTPATIENT
Start: 2018-02-09 | End: 2018-02-05

## 2018-02-05 RX ORDER — ATORVASTATIN CALCIUM 40 MG/1
40 TABLET, FILM COATED ORAL EVERY EVENING
Qty: 30 TABLET | Refills: 0 | Status: SHIPPED | OUTPATIENT
Start: 2018-02-05 | End: 2018-02-05

## 2018-02-05 RX ORDER — INSULIN GLARGINE 100 [IU]/ML
6 INJECTION, SOLUTION SUBCUTANEOUS
Qty: 10 ML | Refills: 0 | Status: SHIPPED | OUTPATIENT
Start: 2018-02-05 | End: 2018-02-05

## 2018-02-05 RX ORDER — INSULIN GLARGINE 100 [IU]/ML
6 INJECTION, SOLUTION SUBCUTANEOUS
Qty: 10 ML | Refills: 0 | Status: SHIPPED | OUTPATIENT
Start: 2018-02-06 | End: 2018-02-19 | Stop reason: HOSPADM

## 2018-02-05 RX ORDER — ASPIRIN 81 MG/1
162 TABLET, CHEWABLE ORAL DAILY
Qty: 30 TABLET | Refills: 0 | Status: SHIPPED | OUTPATIENT
Start: 2018-02-06 | End: 2018-03-19 | Stop reason: ALTCHOICE

## 2018-02-05 RX ADMIN — INSULIN LISPRO 2 UNITS: 100 INJECTION, SOLUTION INTRAVENOUS; SUBCUTANEOUS at 09:01

## 2018-02-05 RX ADMIN — INFLUENZA VIRUS VACCINE 0.5 ML: 15; 15; 15; 15 SUSPENSION INTRAMUSCULAR at 14:37

## 2018-02-05 RX ADMIN — FOLIC ACID 1 MG: 1 TABLET ORAL at 08:27

## 2018-02-05 RX ADMIN — INSULIN GLARGINE 6 UNITS: 100 INJECTION, SOLUTION SUBCUTANEOUS at 08:26

## 2018-02-05 RX ADMIN — ASPIRIN 81 MG 162 MG: 81 TABLET ORAL at 08:26

## 2018-02-05 RX ADMIN — PANTOPRAZOLE SODIUM 40 MG: 40 TABLET, DELAYED RELEASE ORAL at 05:00

## 2018-02-05 RX ADMIN — CLOPIDOGREL BISULFATE 75 MG: 75 TABLET ORAL at 08:27

## 2018-02-05 RX ADMIN — HEPARIN SODIUM 5000 UNITS: 5000 INJECTION, SOLUTION INTRAVENOUS; SUBCUTANEOUS at 05:00

## 2018-02-05 RX ADMIN — TORSEMIDE 20 MG: 20 TABLET ORAL at 08:27

## 2018-02-05 RX ADMIN — INSULIN LISPRO 6 UNITS: 100 INJECTION, SOLUTION INTRAVENOUS; SUBCUTANEOUS at 11:49

## 2018-02-05 RX ADMIN — NIFEDIPINE 30 MG: 30 TABLET, FILM COATED, EXTENDED RELEASE ORAL at 08:27

## 2018-02-05 RX ADMIN — CARVEDILOL 25 MG: 12.5 TABLET, FILM COATED ORAL at 08:26

## 2018-02-05 RX ADMIN — INSULIN LISPRO 4 UNITS: 100 INJECTION, SOLUTION INTRAVENOUS; SUBCUTANEOUS at 09:01

## 2018-02-05 RX ADMIN — HYDRALAZINE HYDROCHLORIDE 100 MG: 25 TABLET, FILM COATED ORAL at 08:26

## 2018-02-05 RX ADMIN — INSULIN LISPRO 4 UNITS: 100 INJECTION, SOLUTION INTRAVENOUS; SUBCUTANEOUS at 11:50

## 2018-02-05 NOTE — PROGRESS NOTES
NEPHROLOGY PROGRESS NOTE   Karina Mckeon 27 y o  male MRN: [id number]  Unit/Bed#: -01 Encounter: [id number]  Reason for Consult: LLUVIA, CKD    ASSESSMENT and PLAN:  1  LLUVIA: Resolving  Creatinine stable at around 3 3 to 3 4 the past 3 days  Etiology is ATN due to contrast nephropathy  2  CKD IIIB: Baseline creatinine is 2 2 to 2 4 in 2017  Follows Dr Poonam Nath  3  Nephrotic range proteinuria: Suspect due to DM neph  Serologic work up negative to date  4  Hypertension: Started on Torsemide 20 mg daily yesterday  Monitor renal function with diuretics  Given fluid overload on exam, will likely need to stay on Torsemide for now  Continue rest of BP meds  Would NOT restart Lisinopril on discharge  5  Subacute CVA  6  MBD: Continue Ergocalciferol for Vit D def  7  DM  8  Dispo: stable for discharge from renal standpoint  SUMMARY OF RECOMMENDATIONS:  · May be discharged from renal standpoint  · Continue current BP meds including Torsemide on discharge  · No Lisinopril on discharge  · CBC, BMP 1 week after discharge  · We will arrange for office follow up with Dr Poonam Nath or one of our NPs  Discussed with motherJoel  SUBJECTIVE / INTERVAL HISTORY:  Denies any CP or SOB  OBJECTIVE:  Current Weight: Weight - Scale: 108 kg (238 lb 1 6 oz)  Vitals:    02/04/18 2144 02/04/18 2210 02/05/18 0633 02/05/18 0759   BP: (!) 181/86 (!) 175/82  146/68   BP Location:  Right arm  Right arm   Pulse:  76  80   Resp:  16  16   Temp:  99 °F (37 2 °C)  98 6 °F (37 °C)   TempSrc:  Oral  Oral   SpO2:  98%  96%   Weight:   108 kg (238 lb 1 6 oz)    Height:           Intake/Output Summary (Last 24 hours) at 02/05/18 1006  Last data filed at 02/05/18 0801   Gross per 24 hour   Intake              600 ml   Output              850 ml   Net             -250 ml     General: conscious, cooperative, no distress  Chest/Lungs: crackles on the R lower lung field  CVS: distinct heart sounds, (+) murmur in the base  Abdomen: soft  Extremities: (+) leg and arm edema       Medications:    Current Facility-Administered Medications:     acetaminophen (TYLENOL) tablet 650 mg, 650 mg, Oral, Q4H PRN, Justina Keene PA-C    aspirin chewable tablet 162 mg, 162 mg, Oral, Daily, DEISI Hancock, 162 mg at 02/05/18 7336    atorvastatin (LIPITOR) tablet 40 mg, 40 mg, Oral, QPM, Justina Keene PA-C, 40 mg at 02/04/18 1846    carvedilol (COREG) tablet 25 mg, 25 mg, Oral, BID With Meals, DEISI Hsieh, 25 mg at 02/05/18 9389    clopidogrel (PLAVIX) tablet 75 mg, 75 mg, Oral, Daily, Farzana Dudley MD, 75 mg at 02/05/18 0827    diphenhydrAMINE (BENADRYL) tablet 25 mg, 25 mg, Oral, HS PRN, Farzana Dudley MD    docusate sodium (COLACE) capsule 100 mg, 100 mg, Oral, BID, Justina Keene PA-C, 100 mg at 02/03/18 1835    ergocalciferol (VITAMIN D2) capsule 50,000 Units, 50,000 Units, Oral, Weekly, Erika Green PA-C, 50,000 Units at 92/64/33 4296    folic acid (FOLVITE) tablet 1 mg, 1 mg, Oral, Daily, Filomena Mcdaniel PA-C, 1 mg at 02/05/18 0827    heparin (porcine) subcutaneous injection 5,000 Units, 5,000 Units, Subcutaneous, Q8H [facility], Farzana Dudley MD, 5,000 Units at 02/05/18 0500    hydrALAZINE (APRESOLINE) injection 10 mg, 10 mg, Intravenous, Q6H PRN, Dayna Kinney PA-C, 10 mg at 01/31/18 0413    hydrALAZINE (APRESOLINE) tablet 100 mg, 100 mg, Oral, TID, Ashley Peck MD, 100 mg at 02/05/18 0826    insulin glargine (LANTUS) subcutaneous injection 6 Units, 6 Units, Subcutaneous, Daily With Breakfast, Abner Kwok MD, 6 Units at 02/05/18 0826    insulin glargine (LANTUS) subcutaneous injection 6 Units, 6 Units, Subcutaneous, HS, Abner Kwok MD, 6 Units at 02/04/18 2144    insulin lispro (HumaLOG) 100 units/mL subcutaneous injection 1-6 Units, 1-6 Units, Subcutaneous, HS, Abner Kwok MD, 2 Units at 02/04/18 2149    insulin lispro (HumaLOG) 100 units/mL subcutaneous injection 2-12 Units, 2-12 Units, Subcutaneous, TID AC, 2 Units at 02/05/18 0901 **AND** Fingerstick Glucose (POCT), , , TID AC, Mark Tristan MD    insulin lispro (HumaLOG) 100 units/mL subcutaneous injection 4 Units, 4 Units, Subcutaneous, TID With Meals, Mark Tristan MD, 4 Units at 02/05/18 0901    labetalol (NORMODYNE) injection 10 mg, 10 mg, Intravenous, Q4H PRN, Justina Keene PA-C, 10 mg at 01/29/18 0407    LORazepam (ATIVAN) tablet 0 5 mg, 0 5 mg, Oral, Q8H PRN, Caio Pathak MD    NIFEdipine (PROCARDIA XL) 24 hr tablet 30 mg, 30 mg, Oral, BID, Kylah Garrett MD, 30 mg at 02/05/18 0827    ondansetron (ZOFRAN) injection 4 mg, 4 mg, Intravenous, Q6H PRN, Justina Keene PA-C, 4 mg at 01/29/18 0421    pantoprazole (PROTONIX) EC tablet 40 mg, 40 mg, Oral, Early Morning, DEISI Lucio, 40 mg at 02/05/18 0500    polyethylene glycol (MIRALAX) packet 17 g, 17 g, Oral, Daily, Carey Marsh DO, 17 g at 01/31/18 1206    promethazine (PHENERGAN) injection 25 mg, 25 mg, Intravenous, Q6H PRN, Caio Pathak MD, 25 mg at 01/29/18 0644    torsemide (DEMADEX) tablet 20 mg, 20 mg, Oral, Daily, Kylah Garrett MD, 20 mg at 02/05/18 0827    Laboratory Results:    Results from last 7 days  Lab Units 02/05/18  0503 02/04/18  0602 02/03/18  7827 02/02/18  0417 02/01/18  0410 01/31/18  0425 01/31/18  0423 01/31/18  0417 01/30/18  1001   WBC Thousand/uL  --   --   --  9 58 9 76  --  12 72*  --  14 93*   HEMOGLOBIN g/dL  --   --   --  8 9* 9 2*  --  9 0*  --  9 5*   HEMATOCRIT %  --   --   --  26 3* 26 7*  --  26 3*  --  28 3*   PLATELETS Thousands/uL  --   --   --  272 271  --  308  --  329   SODIUM mmol/L 142 139 141 140  138 142 140  --  138 137   POTASSIUM mmol/L 4 8 5 2 4 5 4 3  4 3 3 9 4 3  --  4 2 4 4   CHLORIDE mmol/L 108 108 108 108  107 109* 108  --  106 107   CO2 mmol/L 27 24 27 26  26 25 23  --  22 21   BUN mg/dL 54* 57* 60* 66*  67* 67* 75*  --  73* 69*   CREATININE mg/dL 3 39* 3 25* 3 41* 3 55*  3 50* 3 76* 4 43*  -- 4 42* 4 77*   CALCIUM mg/dL 8 3 8 2* 7 9* 7 9*  7 8* 7 8* 8 4  --  8 3 8 2*   PHOSPHORUS mg/dL 4 0  --   --  4 2  --   --   --  5 2*  --    GLUCOSE RANDOM mg/dL 130 123 181* 83  90 107 153*  --  151* 278*     Previous work up:  SPEP negative, UPEP negative  ANCA negative, CARMEN negative  Rh factor negative     C3 54, C4 13

## 2018-02-05 NOTE — ASSESSMENT & PLAN NOTE
His creatinine and his urinary output are improving  Crt 3ish today   Remains being followed by Nephrology

## 2018-02-05 NOTE — DISCHARGE SUMMARY
Discharge Summary - 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,    Active Problems:    Cerebrovascular accident (CVA) of left pontine structure ([facility] [address] )    Acute renal failure superimposed on stage 3 chronic kidney disease ([facility] [address] )    Hypertensive urgency    Type 1 diabetes mellitus with diabetic nephropathy, with long-term current use of insulin ([facility] )    History of lacunar cerebrovascular accident (CVA)    Vitamin D deficiency    Hyperhomocysteinemia ([facility] [address] )  Resolved Problems:    Nausea    Hyperphosphatemia    Azotemia      Consultations During Hospital Stay:  · Neurology  · Nephrology  · Hematology  · Endocrine  · After monitor the    Procedures Performed:   Plasmapheresis  Lumbar puncture with negative MS panel  Brain MRI    1  Similar-appearing small focus of subacute infarct along the posterior aspect of the velia near the facial colliculus (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)  2  Unchanged white matter regions of signal abnormality superiorly related to demyelinating disease or other white matter etiologies  3  Increased signal abnormality in the right anterior genu corpus callosum and right thalamus which could be from retained contrast from recent MRI  4  Diffusely increased subarachnoid FLAIR signal likely due to any recent lumbar puncture, oxygenation or metabolic etiology      Normal renal ultrasound  Normal head and neck CTA    Significant Findings / Test Results:     · As above    Incidental Findings:   · none    Test Results Pending at Discharge (will require follow up):   · none     Outpatient Tests Requested:  · CBC BMP in 1 week    Complications:  none    Hospital Course:      Pedrito Durham is a 27 y o  male patient who originally presented to the hospital on 1/22/2018 due to pleural effusion and left lateral gaze palsy  Patient was evaluated in the emergency room, he was found to have accelerated hypertension, acute on chronic renal failure,  and negative head and neck CTA  Patient was admitted the hospital Neurology consulted, further workup including brain MRI, cervical spine and thoracic spine MRI, lumbar puncture revealed subacute CVA with enhancement on MRI lumbar puncture was negative for MS, patient treated with steroid without improvement, then he was started on plasmapheresis, it was felt his visual disturbances is secondary to stroke, he was treated with aspirin, Plavix and statin, patient with previous CVA and positive history of prothrombin gene mutation, Hematology recommending no therapeutic anticoagulation at this time    Nephrology consulted regarding acute renal failure it was felt secondary to ATN and contrast induced nephropathy, his chronic kidney disease and proteinuria secondary to diabetes and hypertension, blood pressure medications were adjusted, started on Demadex for lower extremity edema, currently his kidney function is improving  Endocrine consulted regarding diabetes mellitus type 1 he was kept on insulin    Currently patient is in stable condition, he will be discharged home, he was started on Lexapro by Neurology for post stroke depression, he was instructed to follow with his family doctor in 1 week and with Hematology on 3/5/2018 in addition to Neurology in 4-6 weeks, patient also instructed to follow with Nephrology in 1 week    Outpatient physical therapy    Condition at Discharge: stable     Discharge Day Visit / Exam:     * Please refer to separate progress note for these details *    Discussion with Family:  Patient smaller      Discharge instructions/Information to patient and family:   See after visit summary for information provided to patient and family  Provisions for Follow-Up Care:  See after visit summary for information related to follow-up care and any pertinent home health orders  Planned Readmission: no     Discharge Statement:  I spent 36  minutes discharging the patient  This time was spent on the day of discharge  I had direct contact with the patient on the day of discharge  Greater than 50% of the total time was spent examining patient, answering all patient questions, arranging and discussing plan of care with patient as well as directly providing post-discharge instructions  Additional time then spent on discharge activities  Discharge Medications:  See after visit summary for reconciled discharge medications provided to patient and family  ** Please Note: This note has been constructed using a voice recognition system   **

## 2018-02-05 NOTE — PROGRESS NOTES
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,    Assessment & Plan    - positive prothrombin gene mutation  - recommend continuing dual antiplatelet therapy with Plavix 75 mg and aspirin 162 mg daily    I have discussed with with the patient predominantly more so than his mother at the bedside is going to be the tight control he is going to need to maintain of all of his stroke risk factors and does relate predominantly to his diabetes including his blood sugars, his blood pressure comma his exercise, and his tobacco usage  He was agreeable at this point  We also discussed blood pressure monitoring at home and how to report this to your nephrologist   He had several questions all of which were answered at this time  Prior July 2015 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 and also heterozygote for prothrombin gene mutation  Peng Castañeda was referred to hematology oncology it appears he has never seen them   That time he was also advised to cease smoking a pack a day he has resumed smoking at half a pack a day  Binocular visual disturbance   Assessment & Plan    There has been no improvement with the high dose steroids however he has only had 3 days  We will continue an additional 2 infusions and start plasmapheresis as well          Cerebrovascular accident (CVA) of left pontine structure [facility])   Assessment & Plan    July 2015 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 and also heterozygote for prothrombin gene mutation  Peng Castañeda was referred to hematology oncology it appears he has never seen them   That time he was also advised to cease smoking a pack a day he has resumed smoking at half a pack a day  Acute renal failure superimposed on stage 3 chronic kidney disease ([address] )   Assessment & Plan    His creatinine and his urinary output are improving  Crt 3ish today  Remains being followed by Nephrology         Hypertensive urgency   Assessment & Plan    I have d/w patient in home monitoring and control of meds  Several questions all answered at this time  Type 1 diabetes mellitus with diabetic nephropathy, with long-term current use of insulin ([facility])   Assessment & Plan    Stable  D/w patient tight outpat control to reduce CVA RF        History of lacunar cerebrovascular accident (CVA)   Assessment & Plan    The etiology of his previous stroke (2015) is likely small vessel disease as it is now  He has not been on any anti-platelet agents or cholesterol reducing medications in the intervening 2-3 years  He will remain on them post discharge  Subjective:   A gained some much weight is also feels like I am so swollen    ROS: 12 system cued query was positive for very little improvement in his gaze subjectively over the last week  He denies any new complaints or weakness  A full systemic query was a gauged in all negative  Patient's mother is concerned about depression, the staff nurse reports no adverse events over the last 24 hours  Vitals: Blood pressure 146/68, pulse 80, temperature 98 6 °F (37 °C), temperature source Oral, resp  rate 16, height 5' 10" (1 778 m), weight 108 kg (238 lb 1 6 oz), SpO2 96 %  ,Body mass index is 34 16 kg/m²      MEDS:    Current Facility-Administered Medications:  acetaminophen 650 mg Oral Q4H PRN   aspirin 162 mg Oral Daily   atorvastatin 40 mg Oral QPM   carvedilol 25 mg Oral BID With Meals   clopidogrel 75 mg Oral Daily   diphenhydrAMINE 25 mg Oral HS PRN   docusate sodium 100 mg Oral BID   ergocalciferol 50,000 Units Oral Weekly   folic acid 1 mg Oral Daily   heparin (porcine) 5,000 Units Subcutaneous Q8H [facility]   hydrALAZINE 10 mg Intravenous Q6H PRN   hydrALAZINE 100 mg Oral TID   insulin glargine 6 Units Subcutaneous Daily With Breakfast   insulin glargine 6 Units Subcutaneous HS   insulin lispro 1-6 Units Subcutaneous HS   insulin lispro 2-12 Units Subcutaneous TID AC   insulin lispro 4 Units Subcutaneous TID With Meals   labetalol 10 mg Intravenous Q4H PRN   LORazepam 0 5 mg Oral Q8H PRN   NIFEdipine 30 mg Oral BID   ondansetron 4 mg Intravenous Q6H PRN   pantoprazole 40 mg Oral Early Morning   polyethylene glycol 17 g Oral Daily   promethazine 25 mg Intravenous Q6H PRN   torsemide 20 mg Oral Daily       Physical Exam:    Pat seen in: The bedside chair  He was also observed to be ambulating independently with his mother around the gonzalez  General appearance: alert, conversant  Neck: No carotid bruit  Lungs, Heart, & abdomen: WNL  Extremities: atraumatic, no cyanosis, there is a generalized degree of mild to edema noted in his limbs as well as his face overall  Neurologic:   Mental status: Alert, oriented completely, spontaneous conversation and thought content appropriate  CN:  Remains with a right binocular gaze palsy and a left nasolabial fold weakness  Motor: full power age appropriate x 4 limbs  Sensory: intact  X 4 limbs, PP not tested  Cerebellar: no ataxia with modified maneuvers Finger taps age appropriate  Gait: Fluid smooth, however because of his visual restrictions he needs to scan frequently  He had no LOB w cadance or directional change  DTR's: Age appropriate,  Plantars: downgoing     Lab Results:   I have personally reviewed pertinent reports    , CBC:   Results from last 7 days  Lab Units 02/02/18  0417 02/01/18  0410 01/31/18  0423   WBC Thousand/uL 9 58 9 76 12 72*   RBC Million/uL 2 87* 2 96* 2 93*   HEMOGLOBIN g/dL 8 9* 9 2* 9 0*   HEMATOCRIT % 26 3* 26 7* 26 3*   MCV fL 92 90 90   PLATELETS Thousands/uL 272 271 308   , BMP/CMP:   Results from last 7 days  Lab Units 02/05/18  0503 02/04/18  0602 02/03/18  0452   SODIUM mmol/L 142 139 141   POTASSIUM mmol/L 4 8 5 2 4 5   CHLORIDE mmol/L 108 108 108   CO2 mmol/L 27 24 27   ANION GAP mmol/L 7 7 6   BUN mg/dL 54* 57* 60*   CREATININE mg/dL 3 39* 3 25* 3 41*   GLUCOSE RANDOM mg/dL 130 123 181*   CALCIUM mg/dL 8 3 8 2* 7 9*   EGFR ml/min/1 73sq m 22 24 22   , Vitamin B12:   , HgBA1C:   , TSH:       Imaging Studies: No new neuro imaging         Counseling / Coordination of Care  Total time spent today 40 minutes  Greater than 50% of total time was spent with the patient and / or family counseling and / or coordination of care  A description of the counseling / coordination of care: All of the above was discussed with the patient and his mother at the bedside  They both had several questions all of which I believe were answered to their satisfaction  A wide range of stroke stroke or related healthcare topics were all discussed  Dictation voice to text software has been used in the creation of this document  Please consider this in light of any contextual or grammatical errors

## 2018-02-05 NOTE — ASSESSMENT & PLAN NOTE
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

## 2018-02-05 NOTE — SOCIAL WORK
LOS 14  Not a bundle; Not a readmission  [facility] as he is walking 200' x 4 and is only supervision  Cm will follow up with pt

## 2018-02-05 NOTE — PROGRESS NOTES
Discussed with patient's mother regarding Endocrine recommendation for the dosage of Humalog to be changed to 6 units t i d  with meals and scale

## 2018-02-05 NOTE — ASSESSMENT & PLAN NOTE
- positive prothrombin gene mutation  - recommend continuing dual antiplatelet therapy with Plavix 75 mg and aspirin 162 mg daily    I have discussed with with the patient predominantly more so than his mother at the bedside is going to be the tight control he is going to need to maintain of all of his stroke risk factors and does relate predominantly to his diabetes including his blood sugars, his blood pressure comma his exercise, and his tobacco usage  He was agreeable at this point  We also discussed blood pressure monitoring at home and how to report this to your nephrologist   He had several questions all of which were answered at this time  Prior July 2015 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

## 2018-02-05 NOTE — ASSESSMENT & PLAN NOTE
I have d/w patient in home monitoring and control of meds  Several questions all answered at this time

## 2018-02-05 NOTE — PLAN OF CARE
Problem: DISCHARGE PLANNING - CARE MANAGEMENT  Goal: Discharge to post-acute care or home with appropriate resources  INTERVENTIONS:  - Conduct assessment to determine patient/family and health care team treatment goals, and need for post-acute services based on payer coverage, community resources, and patient preferences, and barriers to discharge  - Address psychosocial, clinical, and financial barriers to discharge as identified in assessment in conjunction with the patient/family and health care team  - Arrange appropriate level of post-acute services according to patients   needs and preference and payer coverage in collaboration with the physician and health care team  - Communicate with and update the patient/family, physician, and health care team regarding progress on the discharge plan  - Arrange appropriate transportation to post-acute venues   Outcome: Progressing  LOS 14  Not a bundle; Not a readmission  [facility] as he is walking 200' x 4 and is only supervision  Cm will follow up with pt

## 2018-02-05 NOTE — ASSESSMENT & PLAN NOTE
The etiology of his previous stroke (2015) is likely small vessel disease as it is now  He has not been on any anti-platelet agents or cholesterol reducing medications in the intervening 2-3 years  He will remain on them post discharge

## 2018-02-05 NOTE — SOCIAL WORK
Cm met with pt and mother  CM explained pt is able to return home as [facility] recommended this because he is able to ambulate over 200' and was able to do this 4 times  Pt is not homebound and does not want VNA  Cm suggested OP PT/OT which pt is very receptive to  Mother feels as though this is the best option for pt as well  Cm provided list of locations for OP PT  CM explained script would be written and provided to them at DC  Pt requested a script for pens for his insulin as this would be easier to administer his insulin  CM name and role reviewed and Discharge Checklist provided  Encouraged patient and caregiver to review prior to discharge

## 2018-02-05 NOTE — OCCUPATIONAL THERAPY NOTE
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, 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     Binocular visual disturbance    Hyperphosphatemia    Vitamin D deficiency    Azotemia     Past Medical History  Past Medical History:   Diagnosis Date    Cerebellar stroke side undetermined 2015 2013    Diabetes type 1, controlled ([facility] )     Hypertension      Past Surgical History  History reviewed  No pertinent surgical history  02/05/18 0828   Note Type   Note type Re-eval   Restrictions/Precautions   Weight Bearing Precautions Per Order No   Other Precautions Fall Risk   Pain Assessment   Pain Assessment No/denies pain   Pain Score No Pain   Home Living   Type of Home House   Home Layout Bed/bath upstairs; Two level   Bathroom Shower/Tub Tub/shower unit   Bathroom Toilet Standard   Bathroom Equipment Other (Comment)  (no grab bars or DME)   Bathroom Accessibility Other (Comment)  (up full flight of stairs)   Home Equipment Other (Comment)  (no AD at baseline)   Additional Comments Pt lives w/ hise parents in [address] [place] [address]   Prior Function   Level of [place] Independent with ADLs and functional mobility   Lives With Family  (parents)   Receives Help From Family   ADL Assistance Independent   IADLs Independent   Falls in the last 6 months 0   Vocational Unemployed   Comments Pt reports I w/ ADL/ IADL PTA including driving   Lifestyle   Autonomy Pt reports I w/ ADL/ IADL PTA   Reciprocal Relationships Pt reports living w/ parents in [facility] and involved parents ; present during re-eval   Service to Others Pt reports currently not working  Pt added that he was [employer]     Intrinsic Gratification Pt reports enjoying wood working  ADL   Eating Assistance 6  Modified independent   Eating Deficit Setup   Grooming Assistance 5  Supervision/Setup   Grooming Deficit Increased time to complete  (standing at sink )   UB Bathing Assistance 6  Modified Independent   UB Bathing Deficit Setup; Increased time to complete   LB Bathing Assistance 5  Supervision/Setup   LB Bathing Deficit Setup; Increased time to complete   UB Dressing Assistance 6  Modified independent   UB Dressing Deficit Setup; Increased time to complete   LB Dressing Assistance 6  Modified independent   LB Dressing Deficit Setup; Increased time to complete   Additional Comments increased time and cues to complete ADL; S standing at sink; leaning agains wall on right for support and to assist   Bed Mobility   Supine to Sit Unable to assess   Sit to Supine Unable to assess   Additional Comments Pt seated at EOB upon therapist arrival and OOB in chair following re-eval   Transfers   Sit to Stand 5  Supervision   Additional items Increased time required   Stand to Sit 5  Supervision   Additional items Increased time required   Additional Comments Pt completed tub transfer w/ S and increased time using grab bars   Functional Mobility   Functional Mobility 5  Supervision   Additional Comments increased time due to decreased vision   Additional items (no AD)   Balance   Static Sitting Good   Dynamic Sitting Fair   Static Standing Fair   Ambulatory Fair -   Activity Tolerance   Activity Tolerance Patient tolerated treatment well   Nurse Made Aware spoke to Zari YOUSSEF Assessment   RUE Assessment [facility]   RU Strength   RUE Overall Strength Deficits; Other (Comment)  (grasp 3+/5; able to complete ADL w/ strength)   Edema   RUE Edema Non-pitting   LUE Assessment   LUE Assessment WFL   LUE Strength   LUE Overall Strength Deficits; Other (Comment)  (grasp 3+/5; able to complete ADL w/ strength)   Edema   LUE Edema Non-pitting   Hand Function   Fine Motor Coordination Impaired  (R handed; decreased finger to nose)   Vision-Basic Assessment   Current Vision Wears glasses all the time   Patient Visual Report Blurring of print when reading;Blurring of vision when changing focal distance; Other (Comment)  (able to recognize Exit sign; reports blurry)   Vision - Complex Assessment   Head Position WDL   Perception   Motor Planning Appears intact   Perseveration Not present   Cognition   Overall Cognitive Status [facility]   Arousal/Participation Alert; Cooperative   Attention Within functional limits   Orientation Level Oriented X4   Memory Within functional limits   Following Commands Follows multistep commands with increased time or repetition   Comments Pt identified by full name and wrist band  Pt agreed to participate in OT re-eval  Pt able to participate in conversation appropriately  Pt able to follow multi- step directions w/ increased time  Assessment   Limitation Decreased ADL status; Decreased UE strength;Decreased high-level ADLs; Decreased self-care trans; Visual deficit   Assessment Pt is a 26 yo male [facility]  Pt seen for OT re-eval  Pt presents w/ binocular vision disorder and significant PMH impacting his occupational performance including HTN, DM, hx CVA  Pt reports I w/ ADL/ IADL PTA and lives w/ his parents in [facility]  Pt demonstrated B UE AROM WFL to complete ADL, and reports right hand dominance  Pt presents w/ non pitting edema B hands  Pt engaged in grooming w/ S after set- up standing at sink  Pt completed UBD / LBD w/ mod I after set- up and increased time while seated at EOB  Pt engaged in functional mobility household distances w/ S and increased time  Pt demonstrated understanding of compensatory strategies to compensate for visual deficits   Pt benefits from min cues and increased time to complete ADL  Pt engaged in functional tub transfer using grab bar w/ S  Pt presents w/ decreased UE strength, decreased dynamic standing balance, decreased activity tolerance, decreased vision impacting his I w/ dressing, bathing, functional mobility, functional transfers, community mobility, clothing mgmt, and food prep / clean up  Pt would benefit from continued OT while in acute care to address deficits  From an OT persepctive, pt would benefit from post acute rehab when medically stable for discharge from acute care to return to baseline level of I  Will continue to follow pt while in acute care   Goals   Patient Goals to get better and be able to drive   Plan   Treatment Interventions ADL retraining;Visual perceptual retraining;Functional transfer training;UE strengthening/ROM; Equipment evaluation/education;Patient/family training; Compensatory technique education; Activityengagement   Goal Expiration Date 02/12/18   OT Frequency 3-5x/wk   Recommendation   OT Discharge Recommendation (post acute rehab)   OT - OK to Discharge (when medically stable)   Barthel Index   Feeding 10   Bathing 0   Grooming Score 5   Dressing Score 10   Bladder Score 10   Bowels Score 10   Toilet Use Score 5   Transfers (Bed/Chair) Score 10   Mobility (Level Surface) Score 10   Stairs Score 5   Barthel Index Score 75   Modified Radha Scale   Modified Radha Scale 3   Pt goals to be met in 7 days:  1  Pt will complete functional transfers to all surfaces w/ mod I  2  Pt will demonstrate good attention and verbalize understanding of compensatory strategies to max I w/ ADL performance  3  Pt will consistently demonstrate understanding of compensatory strategies during ADL performance  4  Pt will complete light homemaking task to gather clothing from closet w/ mod I  5  Pt will complete functional tub transfer w/ mod I  6  Pt will complete UB ADL w/ mod I  7   Pt will complete LB ADL w/ mod I    Jamaica Rajput, OT

## 2018-02-05 NOTE — DISCHARGE INSTR - AVS FIRST PAGE
New left pontine infarct resulting in a binocular right gaze palsy  Tight control as much as possible on your blood pressures your blood sugars, stopping tobacco as soon as possible, increasing daily movement and activity are all appropriate stroke risk factors that you need to control as much as possible to reduced your risk of your next stroke  We have started you on Plavix as well as 2 baby aspirins a day (162 mg) as well as Lipitor in addition to medications for your blood pressure or blood sugar renal disease etc     Outpatient therapy physical and occupational, will be able to assist you with your of visual problems and getting about the house in the community easily  Follow-up with all of your medical providers including:   Your family doctor  Your neurologist, Dr Sebastian Gonzalez,  Your nephrologist,   Your endocrinologist,

## 2018-02-05 NOTE — PROGRESS NOTES
Progress Note - Marisa Mckeon 27 y o  male MRN: [id number]    Unit/Bed#: -01 Encounter: [id number]      CC: diabetes f/u    Subjective: Hailee Mcintosh is a 27y o  year old male with type 1 diabetes  Feels well  No complaints  No hypoglycemia  His stated his appetite is improved  Blood sugars elevated after meals  Denies nausea, vomiting  He has good energy    Objective:     Vitals: Blood pressure 146/68, pulse 80, temperature 98 6 °F (37 °C), temperature source Oral, resp  rate 16, height 5' 10" (1 778 m), weight 108 kg (238 lb 1 6 oz), SpO2 96 %  ,Body mass index is 34 16 kg/m²  Intake/Output Summary (Last 24 hours) at 02/05/18 1453  Last data filed at 02/05/18 0801   Gross per 24 hour   Intake              600 ml   Output              850 ml   Net             -250 ml       Physical Exam:  General Appearance: awake, appears stated age and cooperative  Head: Normocephalic, without obvious abnormality, atraumatic  Extremities: moves all extremities  Skin: Skin color and temperature normal    Pulm: no labored breathing    Lab, Imaging and other studies: I have personally reviewed pertinent reports  POC Glucose (mg/dl)   Date Value   02/05/2018 295 (H)   02/05/2018 174 (H)   02/04/2018 219 (H)   02/04/2018 151 (H)   02/04/2018 304 (H)   02/04/2018 153 (H)   02/04/2018 129   02/03/2018 213 (H)   02/03/2018 304 (H)   02/03/2018 375 (H)       Assessment:  diabetes with hyperglycemia, type 1, uncontrolled  Lab Results   Component Value Date    HGBA1C 6 4 (H) 01/23/2018         Plan:  -continue Lantus 12 units twice a day  -Increase  Humalog with meals, to 6 units with meals +scale   -pt will need appt, for follow up with Endocrinology on discharge, in 2-3 weeks  -please see discharge instructions  -discussed with patient and family  -discussed plan with primary team, Dr Elisha Hewitt MD        Portions of the record may have been created with voice recognition software

## 2018-02-05 NOTE — PLAN OF CARE
Activity Intolerance/Impaired Mobility     Mobility/activity is maintained at optimum level for patient Adequate for Discharge        DISCHARGE PLANNING     Discharge to home or other facility with appropriate resources Adequate for Discharge        DISCHARGE PLANNING - CARE MANAGEMENT     Discharge to post-acute care or home with appropriate resources Adequate for Discharge        INFECTION - ADULT     Absence or prevention of progression during hospitalization Adequate for Discharge     Absence of fever/infection during neutropenic period Adequate for Discharge        Knowledge Deficit     Patient/family/caregiver demonstrates understanding of disease process, treatment plan, medications, and discharge instructions Adequate for Discharge        Neurological Deficit     Neurological status is stable or improving Adequate for Discharge        Nutrition/Hydration-ADULT     Nutrient/Hydration intake appropriate for improving, restoring or maintaining nutritional needs Adequate for Discharge        PAIN - ADULT     Verbalizes/displays adequate comfort level or baseline comfort level Adequate for Discharge        Potential for Falls     Patient will remain free of falls Adequate for Discharge        SAFETY ADULT     Patient will remain free of falls Adequate for Discharge     Maintain or return to baseline ADL function Adequate for Discharge     Maintain or return mobility status to optimal level Adequate for Discharge

## 2018-02-05 NOTE — PLAN OF CARE
Problem: DISCHARGE PLANNING - CARE MANAGEMENT  Goal: Discharge to post-acute care or home with appropriate resources  INTERVENTIONS:  - Conduct assessment to determine patient/family and health care team treatment goals, and need for post-acute services based on payer coverage, community resources, and patient preferences, and barriers to discharge  - Address psychosocial, clinical, and financial barriers to discharge as identified in assessment in conjunction with the patient/family and health care team  - Arrange appropriate level of post-acute services according to patients   needs and preference and payer coverage in collaboration with the physician and health care team  - Communicate with and update the patient/family, physician, and health care team regarding progress on the discharge plan  - Arrange appropriate transportation to post-acute venues   Outcome: Completed Date Met: 02/05/18  Cm met with pt and mother  CM explained pt is able to return home as [facility] recommended this because he is able to ambulate over 200' and was able to do this 4 times  Pt is not homebound and does not want VNA  Cm suggested OP PT/OT which pt is very receptive to  Mother feels as though this is the best option for pt as well  Cm provided list of locations for OP PT  CM explained script would be written and provided to them at [place]  Pt requested a script for pens for his insulin as this would be easier to administer his insulin  CM name and role reviewed and Discharge Checklist provided  Encouraged patient and caregiver to review prior to discharge

## 2018-02-05 NOTE — PLAN OF CARE
Problem: OCCUPATIONAL THERAPY ADULT  Goal: Performs self-care activities at highest level of function for planned discharge setting  See evaluation for individualized goals  Treatment Interventions: ADL retraining, Visual perceptual retraining, UE strengthening/ROM, Equipment evaluation/education, Compensatory technique education, Continued evaluation, Activityengagement, Patient/family training          See flowsheet documentation for full assessment, interventions and recommendations  Outcome: Progressing  Limitation: Decreased ADL status, Decreased UE strength, Decreased high-level ADLs, Decreased self-care trans, Visual deficit     Assessment: Pt is a 28 yo male [facility] on 1/22/2018  Pt seen for OT re-eval  Pt presents w/ binocular vision disorder and significant PMH impacting his occupational performance including HTN, DM, hx CVA  Pt reports I w/ ADL/ IADL PTA and lives w/ his parents in [facility]  Pt demonstrated B UE AROM WFL to complete ADL, and reports right hand dominance  Pt presents w/ non pitting edema B hands  Pt engaged in grooming w/ S after set- up standing at sink  Pt completed UBD / LBD w/ mod I after set- up and increased time while seated at EOB  Pt engaged in functional mobility household distances w/ S and increased time  Pt demonstrated understanding of compensatory strategies to compensate for visual deficits  Pt benefits from min cues and increased time to complete ADL  Pt engaged in functional tub transfer using grab bar w/ S  Pt presents w/ decreased UE strength, decreased dynamic standing balance, decreased activity tolerance, decreased vision impacting his I w/ dressing, bathing, functional mobility, functional transfers, community mobility, clothing mgmt, and food prep / clean up  Pt would benefit from continued OT while in acute care to address deficits   From an OT persepctive, pt would benefit from post acute rehab when medically stable for discharge from acute care to return to baseline level of I   Will continue to follow pt while in acute care     OT Discharge Recommendation:  (post acute rehab)  OT - OK to Discharge:  (when medically stable)

## 2018-02-05 NOTE — PROGRESS NOTES
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   Active Problems:    Cerebrovascular accident (CVA) of left pontine structure ([facility] )    Acute renal failure superimposed on stage 3 chronic kidney disease ([facility] )    Hypertensive urgency    Type 1 diabetes mellitus with diabetic nephropathy, with long-term current use of insulin ([facility] )    History of lacunar cerebrovascular accident (CVA)    Hyperphosphatemia    Vitamin D deficiency    Azotemia      Plan:    1  Visual disturbances secondary to Subacute CVA , hx of  positive prothrombin gene mutation, Per Hematology no need for therapeutic anticoagulation,  per Neurology continue Plavix and statin  Outpatient Hematology and Neurology follow up, awaiting rehab placement  2  LLUVIA /CKD stage III with proteinuria and lower extremity edema, secondary to ATN with contrast induced nephropathy, started on [facility] nephrology is following  3  Hypertensive urgency, nephrology is following, currently on Coreg, nifedipine, and hydralazine  4  Diabetes mellitus type 1 with A1c 6 4, blood sugar acceptable,  endocrine is following, currently on Lantus and Humalog   5  Vitamin-D deficiency, on vitamin-D supplement  6  Tobacco smoking, refused nicotine patch      VTE Pharmacologic Prophylaxis:   Pharmacologic: Heparin  Mechanical VTE Prophylaxis in Place: Yes    Patient Centered Rounds: I have performed bedside rounds with nursing staff today  Discussions with Specialists or Other Care Team Provider:      Education and Discussions with Family / Patient:  Patient and parents at bedside    Time Spent for Care: 30 minutes  More than 50% of total time spent on counseling and coordination of care as described above      Current Length of Stay: 14 day(s)    Current Patient Status: Inpatient   Certification Statement: The patient will continue to require additional inpatient hospital stay due to Management of acute CVA    Discharge Plan / Estimated Discharge Date:  Awaiting rehab placement    Code Status: Level 1 - Full Code      Subjective:   Patient seen and examined  Comfortable in bed  No chest pain or shortness of breath    Objective:     Vitals:   Temp (24hrs), Av 6 °F (37 °C), Min:98 2 °F (36 8 °C), Max:99 °F (37 2 °C)    HR:  [73-80] 80  Resp:  [16] 16  BP: (146-181)/(68-86) 146/68  SpO2:  [96 %-98 %] 96 %  Body mass index is 34 16 kg/m²  Input and Output Summary (last 24 hours): Intake/Output Summary (Last 24 hours) at 18 1053  Last data filed at 18 0801   Gross per 24 hour   Intake              600 ml   Output              850 ml   Net             -250 ml       Physical Exam:     Physical Exam  Patient is awake alert in no acute distress  Lung clear to auscultation bilateral  Heart positive S1-S2 no murmur  Abdomen soft nontender positive bowel sounds  Lower extremity 2+ pitting edema    Additional Data:     Labs:      Results from last 7 days  Lab Units 18  0417 18  0410   WBC Thousand/uL 9 58 9 76   HEMOGLOBIN g/dL 8 9* 9 2*   HEMATOCRIT % 26 3* 26 7*   PLATELETS Thousands/uL 272 271   NEUTROS PCT %  --  59   LYMPHS PCT %  --  28   LYMPHO PCT % 26  --    MONOS PCT %  --  9   MONO PCT MAN % 4  --    EOS PCT %  --  4   EOSINO PCT MANUAL % 4  --        Results from last 7 days  Lab Units 18  0503   SODIUM mmol/L 142   POTASSIUM mmol/L 4 8   CHLORIDE mmol/L 108   CO2 mmol/L 27   BUN mg/dL 54*   CREATININE mg/dL 3 39*   CALCIUM mg/dL 8 3   GLUCOSE RANDOM mg/dL 130       Results from last 7 days  Lab Units 18  0417   INR  1 04       * I Have Reviewed All Lab Data Listed Above  * Additional Pertinent Lab Tests Reviewed:  [facility]    Imaging:    Imaging Reports Reviewed Today Include:   Imaging Personally Reviewed by Myself Includes:      Recent Cultures (last 7 days):           Last 24 Hours Medication List:     Current Facility-Administered Medications:  acetaminophen 650 mg Oral Q4H PRN Anthony Rivas PA-C   aspirin 162 mg Oral Daily DEISI Hancock   atorvastatin 40 mg Oral QPM Justina Keene PA-C   carvedilol 25 mg Oral BID With Meals DEISI Grant   clopidogrel 75 mg Oral Daily Taylor Strauss MD   diphenhydrAMINE 25 mg Oral HS PRN Taylor Strauss MD   docusate sodium 100 mg Oral BID Anthony Rivas PA-C   ergocalciferol 50,000 Units Oral Weekly Erika Green PA-C   folic acid 1 mg Oral Daily Filomena Mcdaniel PA-C   heparin (porcine) 5,000 Units Subcutaneous Q8H [facility] Taylor Strauss MD   hydrALAZINE 10 mg Intravenous Q6H PRN Wilma Pennington PA-C   hydrALAZINE 100 mg Oral TID Connie Tucker MD   insulin glargine 6 Units Subcutaneous Daily With Breakfast Peg Jain MD   insulin glargine 6 Units Subcutaneous HS Peg Jain MD   insulin lispro 1-6 Units Subcutaneous HS Peg Jain MD   insulin lispro 2-12 Units Subcutaneous TID AC Peg Jain MD   insulin lispro 4 Units Subcutaneous TID With Meals Peg Jain MD   labetalol 10 mg Intravenous Q4H PRN Justina Keene PA-C   LORazepam 0 5 mg Oral Q8H PRN Taylor Strauss MD   NIFEdipine 30 mg Oral BID Connie Tucker MD   ondansetron 4 mg Intravenous Q6H PRN Justina Keene PA-C   pantoprazole 40 mg Oral Early Morning DEISI Lucio   polyethylene glycol 17 g Oral Daily Ryann Bradshaw DO   promethazine 25 mg Intravenous Q6H PRN Taylor Strauss MD   torsemide 20 mg Oral Daily Connie Tucker MD        Today, Patient Was Seen By: Ryann Bradshaw DO    ** Please Note: This note has been constructed using a voice recognition system   **

## 2018-02-05 NOTE — DISCHARGE INSTRUCTIONS
INSULIN DOSAGE INSTRUCTIONS    Name: Edwin Sands                        : 1985  MRN #: [id number]    Your Current Insulin  and dose is: Before Breakfast Before Lunch Before Evening Meal Bedtime     Humalog Insulin     6 units      6 units    6  Units     Regular, Apidra, Humalog orNovolog Sliding Scale:   <80              151-200 + 1 +1 +1    201-250 + 2 +2 +2 +   251-300 + 3 +3 +3 +   301-350 + 4 +4 +4 +   >350 + 5 +5 +5 +                     Lantus Insulin   6 units    6 units      Additional Instructions:   Please test your blood sugar:  _4_ Times per day  X_ Before Breakfast                _ Alternate Testing  X_ Before Lunch                _ 2 Hours After  Meal  X_ Before Evening Meal               _ 3 a m   x_ Before Bedtime Snack     Target Blood sugar range _80-160 mg/dl _  Call if your [facility], DO  blood sugar is less than _70 _ or greater than _400__      Today's Date: 2018

## 2018-02-07 ENCOUNTER — TELEPHONE (OUTPATIENT)
Dept: INTERNAL MEDICINE CLINIC | Facility: CLINIC | Age: 33
End: 2018-02-07

## 2018-02-07 DIAGNOSIS — I63.9 CEREBROVASCULAR ACCIDENT (CVA) OF LEFT PONTINE STRUCTURE (HCC): Primary | ICD-10-CM

## 2018-02-07 NOTE — TELEPHONE ENCOUNTER
Violeta Quiroz from the Neurology Dept called to note that Pt has a hospital follow up with them on 2/15  Pt needs a Physician Order be put in for this  The Dx was  CVA Left [place] Structure  Pt has [facility] so they require the Physician Order  Would you be able to put this into Epic? She noted that since the faxes are down, this would be the best method

## 2018-02-08 ENCOUNTER — TELEPHONE (OUTPATIENT)
Dept: NEUROLOGY | Facility: CLINIC | Age: 33
End: 2018-02-08

## 2018-02-08 NOTE — TELEPHONE ENCOUNTER
Lm for pt to call office back regarding appt with Abner Braun on 2/15/18 appt needs to be RS  Pt needs to be seen 4-6 weeks after hospitalization  Also needs to be with Dr Rea Hu

## 2018-02-09 ENCOUNTER — EVALUATION (OUTPATIENT)
Dept: PHYSICAL THERAPY | Facility: CLINIC | Age: 33
End: 2018-02-09
Payer: COMMERCIAL

## 2018-02-09 ENCOUNTER — TRANSITIONAL CARE MANAGEMENT (OUTPATIENT)
Dept: INTERNAL MEDICINE CLINIC | Facility: CLINIC | Age: 33
End: 2018-02-09

## 2018-02-09 ENCOUNTER — OFFICE VISIT (OUTPATIENT)
Dept: INTERNAL MEDICINE CLINIC | Facility: CLINIC | Age: 33
End: 2018-02-09
Payer: COMMERCIAL

## 2018-02-09 VITALS
HEIGHT: 71 IN | TEMPERATURE: 98.1 F | DIASTOLIC BLOOD PRESSURE: 100 MMHG | BODY MASS INDEX: 32.56 KG/M2 | HEART RATE: 75 BPM | SYSTOLIC BLOOD PRESSURE: 180 MMHG | RESPIRATION RATE: 16 BRPM | WEIGHT: 232.6 LBS | OXYGEN SATURATION: 98 %

## 2018-02-09 VITALS — DIASTOLIC BLOOD PRESSURE: 92 MMHG | SYSTOLIC BLOOD PRESSURE: 172 MMHG | HEART RATE: 87 BPM

## 2018-02-09 DIAGNOSIS — H53.8 BLURRED VISION: ICD-10-CM

## 2018-02-09 DIAGNOSIS — N17.0 ACUTE RENAL FAILURE WITH ACUTE TUBULAR NECROSIS SUPERIMPOSED ON STAGE 2 CHRONIC KIDNEY DISEASE (HCC): ICD-10-CM

## 2018-02-09 DIAGNOSIS — E72.11 HYPERHOMOCYSTEINEMIA (HCC): ICD-10-CM

## 2018-02-09 DIAGNOSIS — E10.22 UNCONTROLLED TYPE 1 DIABETES MELLITUS WITH CHRONIC KIDNEY DISEASE, UNSPECIFIED CKD STAGE: ICD-10-CM

## 2018-02-09 DIAGNOSIS — N18.9 CHRONIC KIDNEY DISEASE: ICD-10-CM

## 2018-02-09 DIAGNOSIS — I63.9 CVA (CEREBRAL VASCULAR ACCIDENT) (HCC): Primary | ICD-10-CM

## 2018-02-09 DIAGNOSIS — G35 OPTIC NEURITIS DUE TO MULTIPLE SCLEROSIS (HCC): ICD-10-CM

## 2018-02-09 DIAGNOSIS — E83.39 HYPERPHOSPHATEMIA: ICD-10-CM

## 2018-02-09 DIAGNOSIS — E55.9 VITAMIN D DEFICIENCY: ICD-10-CM

## 2018-02-09 DIAGNOSIS — N18.30 ACUTE RENAL FAILURE WITH ACUTE TUBULAR NECROSIS SUPERIMPOSED ON STAGE 3 CHRONIC KIDNEY DISEASE (HCC): ICD-10-CM

## 2018-02-09 DIAGNOSIS — N18.2 ACUTE RENAL FAILURE WITH ACUTE TUBULAR NECROSIS SUPERIMPOSED ON STAGE 2 CHRONIC KIDNEY DISEASE (HCC): ICD-10-CM

## 2018-02-09 DIAGNOSIS — I63.9 CEREBROVASCULAR ACCIDENT (CVA) OF LEFT PONTINE STRUCTURE (HCC): ICD-10-CM

## 2018-02-09 DIAGNOSIS — R80.1 PERSISTENT PROTEINURIA: ICD-10-CM

## 2018-02-09 DIAGNOSIS — H51.0 BINOCULAR VISION DISORDER WITH CONJUGATE GAZE PALSY: ICD-10-CM

## 2018-02-09 DIAGNOSIS — I16.0 HYPERTENSIVE URGENCY: ICD-10-CM

## 2018-02-09 DIAGNOSIS — I63.9 CEREBROVASCULAR ACCIDENT (CVA), UNSPECIFIED MECHANISM (HCC): ICD-10-CM

## 2018-02-09 DIAGNOSIS — R79.89 AZOTEMIA: ICD-10-CM

## 2018-02-09 DIAGNOSIS — N17.0 ACUTE RENAL FAILURE WITH ACUTE TUBULAR NECROSIS SUPERIMPOSED ON STAGE 3 CHRONIC KIDNEY DISEASE (HCC): ICD-10-CM

## 2018-02-09 DIAGNOSIS — Z86.73 HISTORY OF LACUNAR CEREBROVASCULAR ACCIDENT (CVA): ICD-10-CM

## 2018-02-09 DIAGNOSIS — R11.0 NAUSEA: ICD-10-CM

## 2018-02-09 DIAGNOSIS — H46.9 OPTIC NEURITIS DUE TO MULTIPLE SCLEROSIS (HCC): ICD-10-CM

## 2018-02-09 DIAGNOSIS — R80.9 PROTEINURIA: ICD-10-CM

## 2018-02-09 DIAGNOSIS — H53.30 BINOCULAR VISUAL DISTURBANCE: ICD-10-CM

## 2018-02-09 DIAGNOSIS — I10 ACCELERATED HYPERTENSION: Primary | ICD-10-CM

## 2018-02-09 DIAGNOSIS — E10.9 DIABETES MELLITUS TYPE 1 (HCC): ICD-10-CM

## 2018-02-09 DIAGNOSIS — E10.65 UNCONTROLLED TYPE 1 DIABETES MELLITUS WITH CHRONIC KIDNEY DISEASE, UNSPECIFIED CKD STAGE: ICD-10-CM

## 2018-02-09 DIAGNOSIS — I10 HYPERTENSION: ICD-10-CM

## 2018-02-09 DIAGNOSIS — E10.21 TYPE 1 DIABETES MELLITUS WITH DIABETIC NEPHROPATHY, WITH LONG-TERM CURRENT USE OF INSULIN (HCC): ICD-10-CM

## 2018-02-09 PROCEDURE — G8978 MOBILITY CURRENT STATUS: HCPCS

## 2018-02-09 PROCEDURE — 99496 TRANSJ CARE MGMT HIGH F2F 7D: CPT | Performed by: INTERNAL MEDICINE

## 2018-02-09 PROCEDURE — G8979 MOBILITY GOAL STATUS: HCPCS

## 2018-02-09 PROCEDURE — 97162 PT EVAL MOD COMPLEX 30 MIN: CPT

## 2018-02-09 RX ORDER — ATORVASTATIN CALCIUM 40 MG/1
1 TABLET, FILM COATED ORAL DAILY
COMMUNITY
Start: 2016-02-17 | End: 2018-02-10 | Stop reason: ALTCHOICE

## 2018-02-09 RX ORDER — CARVEDILOL 25 MG/1
1 TABLET ORAL 2 TIMES DAILY
COMMUNITY
Start: 2016-01-03 | End: 2018-02-10 | Stop reason: ALTCHOICE

## 2018-02-09 RX ORDER — PEN NEEDLE, DIABETIC 31 GX5/16"
NEEDLE, DISPOSABLE MISCELLANEOUS
COMMUNITY
Start: 2018-02-05 | End: 2018-02-27 | Stop reason: SDUPTHER

## 2018-02-09 RX ORDER — INSULIN LISPRO 100 [IU]/ML
INJECTION, SOLUTION INTRAVENOUS; SUBCUTANEOUS
COMMUNITY
Start: 2018-02-05 | End: 2018-02-19 | Stop reason: HOSPADM

## 2018-02-09 RX ORDER — HYDRALAZINE HYDROCHLORIDE 50 MG/1
1 TABLET, FILM COATED ORAL 3 TIMES DAILY
COMMUNITY
End: 2018-02-09

## 2018-02-09 RX ORDER — NIFEDIPINE 60 MG/1
TABLET, FILM COATED, EXTENDED RELEASE ORAL
Status: ON HOLD | COMMUNITY
Start: 2018-02-05 | End: 2018-02-19

## 2018-02-09 NOTE — ASSESSMENT & PLAN NOTE
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

## 2018-02-09 NOTE — PROGRESS NOTES
PT Evaluation     Today's date: 2018  Patient name: Pedrito Durham  : 1985  MRN: [id number]  Referring provider: Lalitha Chauhan DO  Dx:   Encounter Diagnoses   Name Primary?  CVA (cerebral vascular accident) ([facility] ) Yes    Blurred vision     Chronic kidney disease     Diabetes mellitus type 1 ([facility] )     Hypertension     Cerebrovascular accident (CVA), unspecified mechanism ([facility] )     Optic neuritis due to multiple sclerosis ([facility] )     Hypertensive urgency     Acute renal failure with acute tubular necrosis superimposed on stage 2 chronic kidney disease (HCC)     Proteinuria     Acute renal failure with acute tubular necrosis superimposed on stage 3 chronic kidney disease (HCC)     Azotemia     Hyperphosphatemia     Persistent proteinuria     Vitamin D deficiency     Cerebrovascular accident (CVA) of left pontine structure ([facility] )     Type 1 diabetes mellitus with diabetic nephropathy, with long-term current use of insulin ([facility])     History of lacunar cerebrovascular accident (CVA)     Nausea     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,       Hyperhomocysteinemia (HCC)     Binocular visual disturbance                   Assessment  Impairments: abnormal coordination, abnormal gait, activity intolerance, impaired balance, impaired physical strength and lacks appropriate home exercise program    Patient is not irritable  Assessment details: Patient is a 27year old male presenting to physical therapy with a CVA on 18  Patient has a history of a previous CVA, HTN, DM type 1, and blurry vision  Patient demonstrates impaired coordination impaired vision, mild impairments in LE strength per MMT and the 5x STS, impaired balance per the Callejas and the mCTSIS, and impaired gait speed and endurance per the 6 MWT  Patient may benefit from a referral to occupational therapy secondary to visual changes from the CVA   Patient may benefit from skilled physical therapy to address the above impairments and facilitate return to daily activities with decreased risk for falls and without limitation  Understanding of Dx/Px/POC: good   Prognosis: good    Goals  STG 1: Patient will complete the Callejas Balance Assessment with a score of 52/56 in 4 weeks   STG 2: Patient will ambulate 1150 feet during the 6 MWT with no AD and good stability in 4 weeks  STG 3: Patient will complete the 5x STS in <15 seconds with no UE use in 4 weeks    LTG 1: Patient will deny falls or near falls in 8 weeks  LTG 2: Patient will be independent with HEP in 8 weeks      Plan  Patient would benefit from: skilled PT and OT eval  Referral necessary: Yes  Planned therapy interventions: neuromuscular re-education, patient education, balance, therapeutic exercise, therapeutic activities, strengthening and home exercise program  Frequency: 2x week  Duration in visits: 16  Duration in weeks: 8  Treatment plan discussed with: patient and family        Subjective Evaluation    History of Present Illness  Date of onset: 1/22/2018  Mechanism of injury: Patient reports that he woke up and was dizzy with visual changes, thought he had a stroke so he went to hospital  Initially thought it was MS but now thinks it was a CVA - on 1/22/18  Patient reports his vision has gotten slightly better and reports that he is "getting by" when moving around  He reports some difficulty with balance and continued vision changes  Reports jessi difficulty with ADLs but is doing everything on his own     Not a recurrent problem Quality of life: fair    Pain  No pain reported    Social Support  Steps to enter house: yes  2  Stairs in house: yes (landing in the milddle of the flight)   20  Lives in: multiple-level home  Lives with: parents    not working (Previously looking for a job and working for [employer] dominance: right      Diagnostic Tests  MRI studies: abnormal  CT scan: abnormal        Objective  PT/OT Neuro Exam  Neurologic Exam   Balance Test    6 Minute Walk Test (ft): 950 feet with no AD   Gait Speed (ft/s): 3 60   5x Sit To Stand (s): 21 19 sec from orange chair         MCTSIB Number of Seconds   Feet Together, Eyes Open 30   Feet Together, Eyes Closed 30   Feet Together, Eyes Open Foam 22   Feet Together, Eyes Closed Foam unable       Flowsheet Rows    Flowsheet Row Most Recent Value   Callejas Balance Scale   1  Sitting to Standing  4   2  Standing Unsupported  4   3  Sitting with Back Unsupported but Feet Supported on Floor or on a Stool  4   4  Standing to Sitting  4   5  Transfers  4   6  Standing Unsupported with Eyes Closed  3   7  Standing Unsupported with Feet Together  4   8  Reach Forward with Outstretched Arm While Standing  4   9   Object from Floor from a Standing Position  4   10  Turning to Look Behind Over Left and Right Shoulders While Standing  4   11  Turn 360 Degrees  2   12  Place Alternate Foot on Step or Stool While Standing Unsupported  3   13  Standing Unsupported One Foot in [address] [facility]  2   14   Standing on One Leg  1   Callejas Balance Score  47          Coordination Left Right   Heel To Shin + +   Rapid Alternating Movement     UE + +   LE + +       Sensation Left Right   Kinesthesia Intact Intact   Light Touch Intact Intact       Muscle Tone Left Right   Modified Joel Scale     Hamstring 0 0   Gastroc 0 0   Quad 0 0       Manual Muscle Testing - Hip Left Right   Flexion 4+ 4+   Extension 4 4   Abduction 4 4   External Rotation 4- 4-     Manual Muscle Testing - Knee Left Right   Flexion 5 5   Extension 4 5     Manual Muscle Testing - Ankle Left Right   Doriflexion 4- 4-   Plantarflexion 2 3        Transfers    Sit To Stand Independent   W/C To Bed Independent   Sit To Supine Independent   Roll Independent       Gait Assessment:   Decreased trunk rotation, moderate path deviation, wide COLBY, decreased reciprocal arm swing    Mild left facial drooping noted, denies difficulty with swallowing or drinking    VOMS (Vestibular/Ocular Motor Screen)-- blurry vision     Resting position: left lateral      Smooth pursuit Abnormal     Saccades (horizontal) Normal, slowed in all directions     Saccades (vertical)  Normal, slowed in all directions     Convergence: Insufficiency - decreased teaming bilaterally       Precautions: CVA with visual disturbances, CKD, DM type 1, HTN, falls    Daily Treatment Diary     Exercise Diary              TM             Step ups             FT/EO foam             FA/EC foam             Tandem gait             Tandem              march

## 2018-02-09 NOTE — ASSESSMENT & PLAN NOTE
Take Nifedipine ER 60 mg in p m , continue carvedilol 25 mg b i d , hydralazine 100 mg t i d  and torsemide 20 mg in a m  Call early next week with BP logs for review  Patient advised to present to ED with worsened dizziness, change in vision or facial asymmetry

## 2018-02-09 NOTE — PROGRESS NOTES
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, elevated homocysteine levels and heterozygote PT gene mutation  He is to remain on ASA< plavix and statin  BP is labile, with goal sbp 150-160s/ due to subacute timing of his his stroke  Continue follow up with Neuro  He is participating in Pt, will also refer for OT  Acute renal failure superimposed on stage 3 chronic kidney disease ([facility] )  Reprinted script for repeat CBC and BMP to be done prior to his Nephrology follow up    Type 1 diabetes mellitus with diabetic nephropathy, with long-term current use of insulin ([facility] )  Refer to Endo  1  Accelerated hypertension     2  Cerebrovascular accident (CVA) of left pontine structure [facility])  Ambulatory referral to Occupational Therapy   3  Uncontrolled type 1 diabetes mellitus with chronic kidney disease, unspecified CKD stage [facility])  Ambulatory referral to Endocrinology   4  Acute renal failure with acute tubular necrosis superimposed on stage 3 chronic kidney disease (HCC)           Subjective:      Patient ID: Alondra Lewis is a 27 y o  male  He is accompanied by his mom      Date and time hospital follow up call was made:  2/7/2018 10:31 AM  Hospital care reviewed:  Records reviewed  Patient was hopsitalized at:  [address] [place] [address]  Date of admission:  1/22/18  Date of discharge:  2/5/18  Diagnosis:  Visual disturbances secondary to subacute CVA  Were the patients medicaitons reviewed and updated:  Yes  Current symptoms:  None  Post hospital issues:  None  Scheduled for follow up?:  Yes  I have advised the patient to call PCP with any new or worsening symptoms   (please type in name along with any credentials): Sera Bryson CMA  Counseling:  Patient  Comments:  Patient appointment scheduled for 2/9/17     He was hospitalized at [facility] on 1/22-2/5/18  He presented with left lateral gaze palsy and feeling imbalanced  In the ED, he was noted to have accelerated HTN and acute on chronic kidney disease  CTH and neck CTA were negative  MRI brain showed small focus of subacute infarct along the posterior aspect of the velia near the facial collicullus, unchanged white matter regions of signal abnormality superiorly related to demyelinating disease or other white matter etiology, increased signal abnormality in the right anterior genu corpus callosum and right thalamus, diffusely increased subarachnoid FLAIR signal likely due to recent lumbar puncture  He was started on plasmapheresis for MS suspect but LP was negative for MS  His symptoms were thought to be secondary to a subacute stroke and was treated with aspirin Plavix and atorvastatin  He had cryptogenic stroke in 2015 and was found to have prothrombin gene mutation at the time but no anticoagulation was indicated then  His creatinine on presentation was 3 and continued to rise after receiving contrast   His blood pressure medications were adjusted, he was discontinued off lisinopril  He was continued on Coreg 25 mg b i d , hydralazine 100 mg t i d  and nifedipine 30 mg twice daily  He was also added on torsemide 20 mg daily  At the pharmacy, he was given a prescription of nifedipine ER 60 mg daily  His discharge he added was 3  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 and insulin regimen was adjusted  Today he reports he has self adjusted his Lantus from 60 units b i d  to 9 units in a m  and 6 units in p m  due to elevated FBS  He takes Humalog 4units TID + SSI  Over the past 2 days, his home BP have been elevated in the mornings    He reports 190-200/90-110s, pulse 60-70s but decreases to 140-160/- with medications  When bp is high, he has felt nausea  He still feels dizzy  Denies Villagomez, SOB, fever  The following portions of the patient's history were reviewed and updated as appropriate: allergies, current medications, past family history, past medical history, past social history, past surgical history and problem list     Review of Systems   Constitutional: Positive for unexpected weight change  Eyes: Positive for visual disturbance  Respiratory: Negative  Cardiovascular: Positive for leg swelling  Negative for chest pain  Neurological: Positive for dizziness  Negative for headaches  Psychiatric/Behavioral: Positive for dysphoric mood  Objective:   BP (!) 180/100 (BP Location: Left arm, Patient Position: Sitting, Cuff Size: Adult)   Pulse 75   Temp 98 1 °F (36 7 °C) (Tympanic)   Resp 16   Ht 5' 11" (1 803 m)   Wt 106 kg (232 lb 9 6 oz)   SpO2 98%   BMI 32 44 kg/m²          Physical Exam   Constitutional: He is oriented to person, place, and time  He appears well-developed  No distress  HENT:   Mouth/Throat: No oropharyngeal exudate  Eyes: Pupils are equal, round, and reactive to light  Neck: Neck supple  No thyromegaly present  Cardiovascular: Normal rate, regular rhythm and normal heart sounds  BLE 2+ edema with excoriations   Pulmonary/Chest: Effort normal and breath sounds normal    Neurological: He is alert and oriented to person, place, and time  He has normal strength  He displays a negative Romberg sign  Gait abnormal    Left lateral gaze palsy  Tandem walking and stable   Psychiatric: He has a normal mood and affect  Vitals reviewed

## 2018-02-10 ENCOUNTER — HOSPITAL ENCOUNTER (INPATIENT)
Facility: HOSPITAL | Age: 33
LOS: 9 days | Discharge: HOME | DRG: 199 | End: 2018-02-19
Attending: EMERGENCY MEDICINE | Admitting: INTERNAL MEDICINE
Payer: COMMERCIAL

## 2018-02-10 DIAGNOSIS — I16.0 HYPERTENSIVE URGENCY: ICD-10-CM

## 2018-02-10 DIAGNOSIS — N17.0 ACUTE RENAL FAILURE WITH ACUTE TUBULAR NECROSIS SUPERIMPOSED ON STAGE 3 CHRONIC KIDNEY DISEASE (HCC): ICD-10-CM

## 2018-02-10 DIAGNOSIS — I63.9 CVA (CEREBRAL VASCULAR ACCIDENT) (HCC): ICD-10-CM

## 2018-02-10 DIAGNOSIS — H53.8 BLURRED VISION: ICD-10-CM

## 2018-02-10 DIAGNOSIS — E10.21 TYPE 1 DIABETES MELLITUS WITH DIABETIC NEPHROPATHY, WITH LONG-TERM CURRENT USE OF INSULIN (HCC): ICD-10-CM

## 2018-02-10 DIAGNOSIS — H51.0 BINOCULAR VISION DISORDER WITH CONJUGATE GAZE PALSY: ICD-10-CM

## 2018-02-10 DIAGNOSIS — R79.89 AZOTEMIA: ICD-10-CM

## 2018-02-10 DIAGNOSIS — I10 HYPERTENSION: Primary | ICD-10-CM

## 2018-02-10 DIAGNOSIS — N17.9 ACUTE RENAL FAILURE SUPERIMPOSED ON STAGE 3 CHRONIC KIDNEY DISEASE, UNSPECIFIED ACUTE RENAL FAILURE TYPE: ICD-10-CM

## 2018-02-10 DIAGNOSIS — R11.0 NAUSEA: ICD-10-CM

## 2018-02-10 DIAGNOSIS — N18.3 ACUTE RENAL FAILURE SUPERIMPOSED ON STAGE 3 CHRONIC KIDNEY DISEASE, UNSPECIFIED ACUTE RENAL FAILURE TYPE: ICD-10-CM

## 2018-02-10 DIAGNOSIS — E10.9 DIABETES MELLITUS TYPE 1 (HCC): ICD-10-CM

## 2018-02-10 DIAGNOSIS — E72.11 HYPERHOMOCYSTEINEMIA (HCC): ICD-10-CM

## 2018-02-10 DIAGNOSIS — H53.30 BINOCULAR VISUAL DISTURBANCE: ICD-10-CM

## 2018-02-10 DIAGNOSIS — N18.30 ANEMIA IN STAGE 3 CHRONIC KIDNEY DISEASE (HCC): Chronic | ICD-10-CM

## 2018-02-10 DIAGNOSIS — Z86.73 HISTORY OF LACUNAR CEREBROVASCULAR ACCIDENT (CVA): ICD-10-CM

## 2018-02-10 DIAGNOSIS — E55.9 VITAMIN D DEFICIENCY: ICD-10-CM

## 2018-02-10 DIAGNOSIS — I63.9 CEREBROVASCULAR ACCIDENT (CVA), UNSPECIFIED MECHANISM (HCC): ICD-10-CM

## 2018-02-10 DIAGNOSIS — G35 OPTIC NEURITIS DUE TO MULTIPLE SCLEROSIS (HCC): ICD-10-CM

## 2018-02-10 DIAGNOSIS — D63.1 ANEMIA IN STAGE 3 CHRONIC KIDNEY DISEASE (HCC): Chronic | ICD-10-CM

## 2018-02-10 DIAGNOSIS — N17.0 ACUTE RENAL FAILURE WITH ACUTE TUBULAR NECROSIS SUPERIMPOSED ON STAGE 2 CHRONIC KIDNEY DISEASE (HCC): ICD-10-CM

## 2018-02-10 DIAGNOSIS — R80.1 PERSISTENT PROTEINURIA: ICD-10-CM

## 2018-02-10 DIAGNOSIS — R80.9 PROTEINURIA: ICD-10-CM

## 2018-02-10 DIAGNOSIS — I63.9 CEREBROVASCULAR ACCIDENT (CVA) OF LEFT PONTINE STRUCTURE (HCC): ICD-10-CM

## 2018-02-10 DIAGNOSIS — I10 ACCELERATED HYPERTENSION: ICD-10-CM

## 2018-02-10 DIAGNOSIS — N18.4 CHRONIC KIDNEY DISEASE, STAGE 4 (SEVERE) (HCC): Chronic | ICD-10-CM

## 2018-02-10 DIAGNOSIS — N18.2 ACUTE RENAL FAILURE WITH ACUTE TUBULAR NECROSIS SUPERIMPOSED ON STAGE 2 CHRONIC KIDNEY DISEASE (HCC): ICD-10-CM

## 2018-02-10 DIAGNOSIS — N18.30 ACUTE RENAL FAILURE WITH ACUTE TUBULAR NECROSIS SUPERIMPOSED ON STAGE 3 CHRONIC KIDNEY DISEASE (HCC): ICD-10-CM

## 2018-02-10 DIAGNOSIS — N18.9 CHRONIC KIDNEY DISEASE: ICD-10-CM

## 2018-02-10 DIAGNOSIS — H46.9 OPTIC NEURITIS DUE TO MULTIPLE SCLEROSIS (HCC): ICD-10-CM

## 2018-02-10 DIAGNOSIS — E83.39 HYPERPHOSPHATEMIA: ICD-10-CM

## 2018-02-10 DIAGNOSIS — E87.5 ACUTE HYPERKALEMIA: ICD-10-CM

## 2018-02-10 PROBLEM — D72.829 LEUKOCYTOSIS (LEUCOCYTOSIS): Status: ACTIVE | Noted: 2018-02-10

## 2018-02-10 LAB
ALBUMIN SERPL BCP-MCNC: 3 G/DL (ref 3.5–5)
ALP SERPL-CCNC: 78 U/L (ref 46–116)
ALT SERPL W P-5'-P-CCNC: 26 U/L (ref 12–78)
ANION GAP SERPL CALCULATED.3IONS-SCNC: 12 MMOL/L (ref 4–13)
ANION GAP SERPL CALCULATED.3IONS-SCNC: 8 MMOL/L (ref 4–13)
AST SERPL W P-5'-P-CCNC: 16 U/L (ref 5–45)
BASOPHILS # BLD AUTO: 0.08 THOUSANDS/ΜL (ref 0–0.1)
BASOPHILS NFR BLD AUTO: 1 % (ref 0–1)
BILIRUB SERPL-MCNC: 0.4 MG/DL (ref 0.2–1)
BUN SERPL-MCNC: 37 MG/DL (ref 5–25)
BUN SERPL-MCNC: 38 MG/DL (ref 5–25)
CALCIUM SERPL-MCNC: 8.5 MG/DL (ref 8.3–10.1)
CALCIUM SERPL-MCNC: 9 MG/DL (ref 8.3–10.1)
CHLORIDE SERPL-SCNC: 108 MMOL/L (ref 100–108)
CHLORIDE SERPL-SCNC: 109 MMOL/L (ref 100–108)
CO2 SERPL-SCNC: 21 MMOL/L (ref 21–32)
CO2 SERPL-SCNC: 22 MMOL/L (ref 21–32)
CREAT SERPL-MCNC: 3.86 MG/DL (ref 0.6–1.3)
CREAT SERPL-MCNC: 4.03 MG/DL (ref 0.6–1.3)
EOSINOPHIL # BLD AUTO: 0.31 THOUSAND/ΜL (ref 0–0.61)
EOSINOPHIL NFR BLD AUTO: 3 % (ref 0–6)
ERYTHROCYTE [DISTWIDTH] IN BLOOD BY AUTOMATED COUNT: 13.3 % (ref 11.6–15.1)
GFR SERPL CREATININE-BSD FRML MDRD: 18 ML/MIN/1.73SQ M
GFR SERPL CREATININE-BSD FRML MDRD: 19 ML/MIN/1.73SQ M
GLUCOSE SERPL-MCNC: 233 MG/DL (ref 65–140)
GLUCOSE SERPL-MCNC: 235 MG/DL (ref 65–140)
GLUCOSE SERPL-MCNC: 243 MG/DL (ref 65–140)
HCT VFR BLD AUTO: 26.2 % (ref 36.5–49.3)
HGB BLD-MCNC: 8.7 G/DL (ref 12–17)
LYMPHOCYTES # BLD AUTO: 1.78 THOUSANDS/ΜL (ref 0.6–4.47)
LYMPHOCYTES NFR BLD AUTO: 16 % (ref 14–44)
MAGNESIUM SERPL-MCNC: 2 MG/DL (ref 1.6–2.6)
MCH RBC QN AUTO: 31.2 PG (ref 26.8–34.3)
MCHC RBC AUTO-ENTMCNC: 33.2 G/DL (ref 31.4–37.4)
MCV RBC AUTO: 94 FL (ref 82–98)
MONOCYTES # BLD AUTO: 0.48 THOUSAND/ΜL (ref 0.17–1.22)
MONOCYTES NFR BLD AUTO: 4 % (ref 4–12)
NEUTROPHILS # BLD AUTO: 8.24 THOUSANDS/ΜL (ref 1.85–7.62)
NEUTS SEG NFR BLD AUTO: 76 % (ref 43–75)
PLATELET # BLD AUTO: 185 THOUSANDS/UL (ref 149–390)
PLATELET # BLD AUTO: 261 THOUSANDS/UL (ref 149–390)
PMV BLD AUTO: 10.4 FL (ref 8.9–12.7)
PMV BLD AUTO: 11 FL (ref 8.9–12.7)
POTASSIUM SERPL-SCNC: 5.8 MMOL/L (ref 3.5–5.3)
POTASSIUM SERPL-SCNC: 7.1 MMOL/L (ref 3.5–5.3)
POTASSIUM SERPL-SCNC: 7.4 MMOL/L (ref 3.5–5.3)
PROT SERPL-MCNC: 5.6 G/DL (ref 6.4–8.2)
RBC # BLD AUTO: 2.79 MILLION/UL (ref 3.88–5.62)
SODIUM SERPL-SCNC: 138 MMOL/L (ref 136–145)
SODIUM SERPL-SCNC: 142 MMOL/L (ref 136–145)
TROPONIN I SERPL-MCNC: 0.02 NG/ML
TROPONIN I SERPL-MCNC: 0.03 NG/ML
WBC # BLD AUTO: 10.89 THOUSAND/UL (ref 4.31–10.16)

## 2018-02-10 PROCEDURE — 36415 COLL VENOUS BLD VENIPUNCTURE: CPT | Performed by: EMERGENCY MEDICINE

## 2018-02-10 PROCEDURE — 99291 CRITICAL CARE FIRST HOUR: CPT | Performed by: INTERNAL MEDICINE

## 2018-02-10 PROCEDURE — 85025 COMPLETE CBC W/AUTO DIFF WBC: CPT | Performed by: EMERGENCY MEDICINE

## 2018-02-10 PROCEDURE — 99253 IP/OBS CNSLTJ NEW/EST LOW 45: CPT | Performed by: INTERNAL MEDICINE

## 2018-02-10 PROCEDURE — 83735 ASSAY OF MAGNESIUM: CPT | Performed by: PHYSICIAN ASSISTANT

## 2018-02-10 PROCEDURE — 96374 THER/PROPH/DIAG INJ IV PUSH: CPT

## 2018-02-10 PROCEDURE — 80053 COMPREHEN METABOLIC PANEL: CPT | Performed by: PHYSICIAN ASSISTANT

## 2018-02-10 PROCEDURE — 84132 ASSAY OF SERUM POTASSIUM: CPT | Performed by: EMERGENCY MEDICINE

## 2018-02-10 PROCEDURE — 99284 EMERGENCY DEPT VISIT MOD MDM: CPT

## 2018-02-10 PROCEDURE — 85049 AUTOMATED PLATELET COUNT: CPT | Performed by: PHYSICIAN ASSISTANT

## 2018-02-10 PROCEDURE — 80048 BASIC METABOLIC PNL TOTAL CA: CPT | Performed by: EMERGENCY MEDICINE

## 2018-02-10 PROCEDURE — 96376 TX/PRO/DX INJ SAME DRUG ADON: CPT

## 2018-02-10 PROCEDURE — 84484 ASSAY OF TROPONIN QUANT: CPT | Performed by: EMERGENCY MEDICINE

## 2018-02-10 PROCEDURE — 84484 ASSAY OF TROPONIN QUANT: CPT | Performed by: PHYSICIAN ASSISTANT

## 2018-02-10 PROCEDURE — 93005 ELECTROCARDIOGRAM TRACING: CPT | Performed by: EMERGENCY MEDICINE

## 2018-02-10 PROCEDURE — 82948 REAGENT STRIP/BLOOD GLUCOSE: CPT

## 2018-02-10 RX ORDER — LABETALOL HYDROCHLORIDE 5 MG/ML
20 INJECTION, SOLUTION INTRAVENOUS ONCE
Status: COMPLETED | OUTPATIENT
Start: 2018-02-10 | End: 2018-02-10

## 2018-02-10 RX ORDER — ONDANSETRON 2 MG/ML
4 INJECTION INTRAMUSCULAR; INTRAVENOUS ONCE
Status: COMPLETED | OUTPATIENT
Start: 2018-02-10 | End: 2018-02-10

## 2018-02-10 RX ORDER — CLOPIDOGREL BISULFATE 75 MG/1
75 TABLET ORAL DAILY
Status: DISCONTINUED | OUTPATIENT
Start: 2018-02-11 | End: 2018-02-19 | Stop reason: HOSPADM

## 2018-02-10 RX ORDER — INSULIN GLARGINE 100 [IU]/ML
6 INJECTION, SOLUTION SUBCUTANEOUS
Status: DISCONTINUED | OUTPATIENT
Start: 2018-02-10 | End: 2018-02-17

## 2018-02-10 RX ORDER — INSULIN GLARGINE 100 [IU]/ML
6 INJECTION, SOLUTION SUBCUTANEOUS
Status: DISCONTINUED | OUTPATIENT
Start: 2018-02-11 | End: 2018-02-13

## 2018-02-10 RX ORDER — NIFEDIPINE 30 MG/1
60 TABLET, EXTENDED RELEASE ORAL DAILY
Status: DISCONTINUED | OUTPATIENT
Start: 2018-02-11 | End: 2018-02-10

## 2018-02-10 RX ORDER — CARVEDILOL 12.5 MG/1
25 TABLET ORAL 2 TIMES DAILY WITH MEALS
Status: DISCONTINUED | OUTPATIENT
Start: 2018-02-11 | End: 2018-02-12

## 2018-02-10 RX ORDER — ASPIRIN 81 MG/1
162 TABLET, CHEWABLE ORAL DAILY
Status: DISCONTINUED | OUTPATIENT
Start: 2018-02-11 | End: 2018-02-19 | Stop reason: HOSPADM

## 2018-02-10 RX ORDER — ESCITALOPRAM OXALATE 10 MG/1
10 TABLET ORAL DAILY
Status: DISCONTINUED | OUTPATIENT
Start: 2018-02-11 | End: 2018-02-19 | Stop reason: HOSPADM

## 2018-02-10 RX ORDER — FUROSEMIDE 10 MG/ML
80 INJECTION INTRAMUSCULAR; INTRAVENOUS ONCE
Status: COMPLETED | OUTPATIENT
Start: 2018-02-10 | End: 2018-02-10

## 2018-02-10 RX ORDER — FOLIC ACID 1 MG/1
1 TABLET ORAL DAILY
Status: DISCONTINUED | OUTPATIENT
Start: 2018-02-11 | End: 2018-02-19 | Stop reason: HOSPADM

## 2018-02-10 RX ORDER — ATORVASTATIN CALCIUM 40 MG/1
40 TABLET, FILM COATED ORAL EVERY EVENING
Status: DISCONTINUED | OUTPATIENT
Start: 2018-02-10 | End: 2018-02-19 | Stop reason: HOSPADM

## 2018-02-10 RX ORDER — TORSEMIDE 20 MG/1
20 TABLET ORAL DAILY
Status: DISCONTINUED | OUTPATIENT
Start: 2018-02-11 | End: 2018-02-11

## 2018-02-10 RX ORDER — DEXTROSE MONOHYDRATE 25 G/50ML
25 INJECTION, SOLUTION INTRAVENOUS ONCE
Status: COMPLETED | OUTPATIENT
Start: 2018-02-10 | End: 2018-02-10

## 2018-02-10 RX ORDER — HYDRALAZINE HYDROCHLORIDE 25 MG/1
100 TABLET, FILM COATED ORAL EVERY 8 HOURS SCHEDULED
Status: DISCONTINUED | OUTPATIENT
Start: 2018-02-10 | End: 2018-02-11

## 2018-02-10 RX ORDER — NIFEDIPINE 30 MG/1
60 TABLET, EXTENDED RELEASE ORAL
Status: DISCONTINUED | OUTPATIENT
Start: 2018-02-11 | End: 2018-02-11

## 2018-02-10 RX ORDER — CHLORHEXIDINE GLUCONATE 0.12 MG/ML
15 RINSE ORAL EVERY 12 HOURS SCHEDULED
Status: DISCONTINUED | OUTPATIENT
Start: 2018-02-10 | End: 2018-02-11

## 2018-02-10 RX ORDER — HEPARIN SODIUM 5000 [USP'U]/ML
5000 INJECTION, SOLUTION INTRAVENOUS; SUBCUTANEOUS EVERY 8 HOURS SCHEDULED
Status: DISCONTINUED | OUTPATIENT
Start: 2018-02-10 | End: 2018-02-19 | Stop reason: HOSPADM

## 2018-02-10 RX ORDER — SODIUM POLYSTYRENE SULFONATE 15 G/60ML
30 SUSPENSION ORAL; RECTAL ONCE
Status: COMPLETED | OUTPATIENT
Start: 2018-02-10 | End: 2018-02-10

## 2018-02-10 RX ADMIN — SODIUM CHLORIDE 10 MG/HR: 0.9 INJECTION, SOLUTION INTRAVENOUS at 21:59

## 2018-02-10 RX ADMIN — FUROSEMIDE 80 MG: 10 INJECTION, SOLUTION INTRAMUSCULAR; INTRAVENOUS at 20:56

## 2018-02-10 RX ADMIN — CHLORHEXIDINE GLUCONATE 15 ML: 1.2 RINSE ORAL at 22:46

## 2018-02-10 RX ADMIN — SODIUM CHLORIDE 5 MG/HR: 0.9 INJECTION, SOLUTION INTRAVENOUS at 19:11

## 2018-02-10 RX ADMIN — SODIUM CHLORIDE 1000 ML: 0.9 INJECTION, SOLUTION INTRAVENOUS at 19:23

## 2018-02-10 RX ADMIN — SODIUM CHLORIDE 15 MG/HR: 0.9 INJECTION, SOLUTION INTRAVENOUS at 22:28

## 2018-02-10 RX ADMIN — SODIUM CHLORIDE 125 ML/HR: 0.9 INJECTION, SOLUTION INTRAVENOUS at 21:45

## 2018-02-10 RX ADMIN — DEXTROSE MONOHYDRATE 25 ML: 25 INJECTION, SOLUTION INTRAVENOUS at 19:25

## 2018-02-10 RX ADMIN — CALCIUM GLUCONATE 1 G: 98 INJECTION, SOLUTION INTRAVENOUS at 19:34

## 2018-02-10 RX ADMIN — LABETALOL HYDROCHLORIDE 20 MG: 5 INJECTION, SOLUTION INTRAVENOUS at 17:58

## 2018-02-10 RX ADMIN — ONDANSETRON 4 MG: 2 INJECTION INTRAMUSCULAR; INTRAVENOUS at 23:23

## 2018-02-10 RX ADMIN — INSULIN LISPRO 2 UNITS: 100 INJECTION, SOLUTION INTRAVENOUS; SUBCUTANEOUS at 22:20

## 2018-02-10 RX ADMIN — HEPARIN SODIUM 5000 UNITS: 5000 INJECTION, SOLUTION INTRAVENOUS; SUBCUTANEOUS at 22:19

## 2018-02-10 RX ADMIN — INSULIN HUMAN 6 UNITS: 100 INJECTION, SOLUTION PARENTERAL at 19:29

## 2018-02-10 RX ADMIN — LABETALOL HYDROCHLORIDE 20 MG: 5 INJECTION, SOLUTION INTRAVENOUS at 18:37

## 2018-02-10 RX ADMIN — ATORVASTATIN CALCIUM 40 MG: 40 TABLET, FILM COATED ORAL at 22:46

## 2018-02-10 RX ADMIN — HYDRALAZINE HYDROCHLORIDE 100 MG: 25 TABLET, FILM COATED ORAL at 22:19

## 2018-02-10 RX ADMIN — SODIUM POLYSTYRENE SULFONATE 30 G: 15 SUSPENSION ORAL; RECTAL at 19:20

## 2018-02-10 RX ADMIN — INSULIN GLARGINE 6 UNITS: 100 INJECTION, SOLUTION SUBCUTANEOUS at 22:20

## 2018-02-10 NOTE — ED PROVIDER NOTES
History  Chief Complaint   Patient presents with    Hypertension     Pt presents to the ED with HTN episode onset this morning  Recently discharged Monday for stroke  Patient presents to the emergency department for evaluation of hypertension  Patient has a history of high blood pressure takes his blood pressure at home and was over 200 at home prior to arrival   States he has mild nausea and a mild headache but otherwise has no new symptoms  Patient was discharged Monday from this hospital after being diagnosed with his 2nd stroke  He is on Plavix but not other blood thinners  He denies trauma or fall  Denies chest pain sob fever chills cough  Denies abdominal or back pain  Denies new visual complaints  He is on and has been taking his multiple antihypertensives  Prior to Admission Medications   Prescriptions Last Dose Informant Patient Reported? Taking?    B-D ULTRAFINE III SHORT PEN 31G X 8 MM MISC   Yes No   HUMALOG KWIKPEN 100 UNIT/ML SOPN   Yes No   NIFEdipine ER (ADALAT CC) 30 MG 24 hr tablet   No No   Sig: Take 1 tablet (30 mg total) by mouth 2 (two) times a day   NIFEdipine ER (ADALAT CC) 60 MG 24 hr tablet   Yes No   aspirin 81 MG tablet   Yes No   Sig: Take 1 tablet by mouth daily   aspirin 81 mg chewable tablet   No No   Sig: Chew 2 tablets (162 mg total) daily   atorvastatin (LIPITOR) 40 mg tablet   No No   Sig: Take 1 tablet (40 mg total) by mouth every evening   atorvastatin (LIPITOR) 40 mg tablet   Yes No   Sig: Take 1 tablet by mouth daily   carvedilol (COREG) 25 mg tablet   No No   Sig: Take 1 tablet (25 mg total) by mouth 2 (two) times a day with meals   carvedilol (COREG) 25 mg tablet   Yes No   Sig: Take 1 tablet by mouth 2 (two) times a day   clopidogrel (PLAVIX) 75 mg tablet   No No   Sig: Take 1 tablet (75 mg total) by mouth daily   ergocalciferol (VITAMIN D2) 50,000 units   No No   Sig: Take 1 capsule (50,000 Units total) by mouth once a week for 10 doses escitalopram (LEXAPRO) 10 mg tablet   No No   Sig: Take 1 tablet (10 mg total) by mouth daily   folic acid (FOLVITE) 1 mg tablet   No No   Sig: Take 1 tablet (1 mg total) by mouth daily   hydrALAZINE (APRESOLINE) 100 MG tablet   No No   Sig: Take 1 tablet (100 mg total) by mouth 3 (three) times a day   influenza inactivated quadrivalent vaccine (FLULAVAL) 0 5 ML BROCK   No No   Sig: Inject 0 5 mL into the shoulder, thigh, or buttocks prior to discharge (vancomycin) for up to 1 dose   insulin glargine (LANTUS) 100 units/mL subcutaneous injection   No No   Sig: Inject 6 Units under the skin daily at bedtime U-100 pen   insulin glargine (LANTUS) 100 units/mL subcutaneous injection   No No   Sig: Inject 6 Units under the skin daily with breakfast   insulin lispro (HumaLOG) 100 units/mL injection   No No   Sig: Inject 4 Units under the skin 3 (three) times a day with meals U-100 pen   torsemide (DEMADEX) 20 mg tablet   No No   Sig: Take 1 tablet (20 mg total) by mouth daily      Facility-Administered Medications: None       Past Medical History:   Diagnosis Date    Cerebellar stroke side undetermined 2015 2013    Diabetes type 1, controlled ([facility] [address] )     Hypertension        History reviewed  No pertinent surgical history  History reviewed  No pertinent family history  I have reviewed and agree with the history as documented  Social History   Substance Use Topics    Smoking status: Current Every Day Smoker     Packs/day: 0 50     Types: Cigarettes    Smokeless tobacco: Never Used    Alcohol use Yes      Comment: socially        Review of Systems   Constitutional: Negative  Negative for activity change, appetite change, chills, diaphoresis, fatigue and fever  HENT: Negative  Negative for congestion, ear pain, facial swelling, sore throat and trouble swallowing  Eyes: Positive for visual disturbance  Negative for photophobia  Respiratory: Negative    Negative for cough, chest tightness, shortness of breath, wheezing and stridor  Cardiovascular: Negative  Negative for chest pain, palpitations and leg swelling  Gastrointestinal: Positive for nausea  Negative for abdominal distention, abdominal pain, anal bleeding, blood in stool, constipation, diarrhea and vomiting  Endocrine: Negative  Genitourinary: Negative  Negative for dysuria  Musculoskeletal: Negative  Negative for back pain, neck pain and neck stiffness  Skin: Negative  Negative for rash and wound  Allergic/Immunologic: Negative  Neurological: Negative  Negative for dizziness, tremors, seizures, syncope, facial asymmetry, speech difficulty, weakness, light-headedness, numbness and headaches  Hematological: Negative  Does not bruise/bleed easily  Psychiatric/Behavioral: Negative  Physical Exam  ED Triage Vitals   Temperature Pulse Respirations Blood Pressure SpO2   02/10/18 1709 02/10/18 1709 02/10/18 1709 02/10/18 1709 02/10/18 1709   98 3 °F (36 8 °C) 74 18 (!) 246/114 99 %      Temp Source Heart Rate Source Patient Position - Orthostatic VS BP Location FiO2 (%)   02/10/18 1709 02/10/18 1709 02/10/18 1709 02/10/18 1709 --   Oral Monitor Sitting Left arm       Pain Score       02/10/18 1852       No Pain           Orthostatic Vital Signs  Vitals:    02/10/18 1709 02/10/18 1727 02/10/18 1852   BP: (!) 246/114 (!) 260/122 (!) 231/106   Pulse: 74  68   Patient Position - Orthostatic VS: Sitting Lying Sitting       Physical Exam   Constitutional: He is oriented to person, place, and time  He appears well-developed and well-nourished  Nontoxic appearance without respiratory distress  Patient can engage in normal conversation and follow simple commands  He looks comfortable sitting upright in the stretcher   HENT:   Head: Normocephalic and atraumatic     Right Ear: External ear normal    Left Ear: External ear normal    Mouth/Throat: Oropharynx is clear and moist    Eyes: Conjunctivae and EOM are normal  Pupils are equal, round, and reactive to light  Neck: Normal range of motion  Neck supple  Cardiovascular: Normal rate, regular rhythm, normal heart sounds and intact distal pulses  Pulmonary/Chest: Effort normal and breath sounds normal  No respiratory distress  He has no wheezes  He has no rales  He exhibits no tenderness  Abdominal: Soft  Bowel sounds are normal  He exhibits no distension and no mass  There is no tenderness  There is no rebound and no guarding  No hernia  Musculoskeletal: Normal range of motion  He exhibits no edema, tenderness or deformity  Neurological: He is alert and oriented to person, place, and time  He has normal reflexes  He displays normal reflexes  No cranial nerve deficit or sensory deficit  He exhibits normal muscle tone  Coordination normal    Skin: Skin is warm and dry  Psychiatric: He has a normal mood and affect  His behavior is normal  Judgment and thought content normal    Nursing note and vitals reviewed  ED Medications  Medications   niCARdipine (CARDENE) 25 mg (STANDARD CONCENTRATION) in sodium chloride 0 9% 250 mL (5 mg/hr Intravenous New Bag 2/10/18 1911)   calcium gluconate 1 g in sodium chloride 0 9 % 100 mL IVPB (1 g Intravenous New Bag 2/10/18 1934)   sodium chloride 0 9 % bolus 1,000 mL (1,000 mL Intravenous New Bag 2/10/18 1923)   labetalol (NORMODYNE) injection 20 mg (20 mg Intravenous Given 2/10/18 1758)   labetalol (NORMODYNE) injection 20 mg (20 mg Intravenous Given 2/10/18 1837)   dextrose 50 % IV solution 25 mL (25 mL Intravenous Given 2/10/18 1925)   insulin regular (HumuLIN R,NovoLIN R) injection 6 Units (6 Units Intravenous Given 2/10/18 1929)   sodium polystyrene sulfonate (KAYEXALATE) oral suspension 30 g (30 g Oral Given 2/10/18 1920)       Diagnostic Studies  Results Reviewed     Procedure Component Value Units Date/Time    Potassium [[id number]] Collected:  02/10/18 1925    Lab Status:   In process Specimen:  Blood from Arm, Left Updated:  02/10/18 1932 Basic metabolic panel [[id number]]  (Abnormal) Collected:  02/10/18 1837    Lab Status:  Final result Specimen:  Blood from Arm, Right Updated:  02/10/18 1902     Sodium 138 mmol/L      Potassium 7 4 (HH) mmol/L      Chloride 108 mmol/L      CO2 22 mmol/L      Anion Gap 8 mmol/L      BUN 38 (H) mg/dL      Creatinine 4 03 (H) mg/dL      Glucose 233 (H) mg/dL      Calcium 8 5 mg/dL      eGFR 18 ml/min/1 73sq m     Narrative:         National Kidney Disease Education Program recommendations are as follows:  GFR calculation is accurate only with a steady state creatinine  Chronic Kidney disease less than 60 ml/min/1 73 sq  meters  Kidney failure less than 15 ml/min/1 73 sq  meters  Troponin I [[id number]]  (Normal) Collected:  02/10/18 1744    Lab Status:  Final result Specimen:  Blood from Arm, Left Updated:  02/10/18 1825     Troponin I 0 02 ng/mL     Narrative:         [employer] Chemistry analyzer 99% cutoff is > 0 04 ng/mL in network labs    o cTnI 99% cutoff is useful only when applied to patients in the clinical setting of myocardial ischemia  o cTnI 99% cutoff should be interpreted in the context of clinical history, ECG findings and possibly cardiac imaging to establish correct diagnosis  o cTnI 99% cutoff may be suggestive but clearly not indicative of a coronary event without the clinical setting of myocardial ischemia      CBC and differential [[id number]]  (Abnormal) Collected:  02/10/18 1744    Lab Status:  Final result Specimen:  Blood from Arm, Left Updated:  02/10/18 1755     WBC 10 89 (H) Thousand/uL      RBC 2 79 (L) Million/uL      Hemoglobin 8 7 (L) g/dL      Hematocrit 26 2 (L) %      MCV 94 fL      MCH 31 2 pg      MCHC 33 2 g/dL      RDW 13 3 %      MPV 11 0 fL      Platelets 750 Thousands/uL      Neutrophils Relative 76 (H) %      Lymphocytes Relative 16 %      Monocytes Relative 4 %      Eosinophils Relative 3 %      Basophils Relative 1 %      Neutrophils Absolute 8 24 (H) Thousands/µL Lymphocytes Absolute 1 78 Thousands/µL      Monocytes Absolute 0 48 Thousand/µL      Eosinophils Absolute 0 31 Thousand/µL      Basophils Absolute 0 08 Thousands/µL                  No orders to display              Procedures  ECG 12 Lead Documentation  Date/Time: 2/10/2018 5:44 PM  Performed by: Keyanna Urbina by: Hildy Gene     ECG reviewed by me, the ED Provider: yes    Patient location:  ED  Previous ECG:     Previous ECG:  Compared to current    Similarity:  Changes noted  Interpretation:     Interpretation: normal    Rate:     ECG rate:  70    ECG rate assessment: normal    Rhythm:     Rhythm: sinus rhythm    Ectopy:     Ectopy: none    QRS:     QRS axis:  Normal    QRS intervals:  Normal  Conduction:     Conduction: normal    ST segments:     ST segments:  Normal  T waves:     T waves: normal             Phone Contacts  ED Phone Contact    ED Course  ED Course as of Feb 10 1949   Sat Feb 10, 2018   1845 Patient to be admitted to the level 2 step-down secondary to severe hypertension  He is neurologically intact and hemodynamically stable other than the elevated hypertension  He was given 2 doses of IV labetalol and subsequently has been put on a Cardene IV drip  1907 Potassium: (!!) 7 4 1908 Patient's potassium came back high and this will be treated  He will be given Kayexalate insulin D50 and calcium gluconate Potassium: (!!) 7 4                               MDM  The patient presented with a condition in which there was a high probability of imminent or life-threatening deterioration, and critical care services (excluding separately billable procedures) totalled 30-74 minutes          Disposition  Final diagnoses:   Hypertension     Time reflects when diagnosis was documented in both MDM as applicable and the Disposition within this note     Time User Action Codes Description Comment    2/10/2018  6:25 PM Dorothy, [address],[place] [address] Hypertension       ED Disposition     ED Disposition Condition Comment    Admit  Case was discussed with Dr Lin Wang and the patient's admission status was agreed to be Admission Status: inpatient status to the service of Dr Earl Figueroa    None       Patient's Medications   Discharge Prescriptions    No medications on file     No discharge procedures on file      ED Provider  Electronically Signed by           Paz Javier MD  02/10/18 1846       Paz Javier MD  02/10/18 0288       Paz Javier MD  02/10/18 7684

## 2018-02-11 ENCOUNTER — APPOINTMENT (INPATIENT)
Dept: ULTRASOUND IMAGING | Facility: HOSPITAL | Age: 33
DRG: 199 | End: 2018-02-11
Payer: COMMERCIAL

## 2018-02-11 ENCOUNTER — APPOINTMENT (INPATIENT)
Dept: RADIOLOGY | Facility: HOSPITAL | Age: 33
DRG: 199 | End: 2018-02-11
Payer: COMMERCIAL

## 2018-02-11 PROBLEM — R80.1 PERSISTENT PROTEINURIA: Status: ACTIVE | Noted: 2018-02-11

## 2018-02-11 PROBLEM — N17.9 ACUTE KIDNEY INJURY (HCC): Status: ACTIVE | Noted: 2018-02-11

## 2018-02-11 PROBLEM — N18.4 CHRONIC KIDNEY DISEASE, STAGE 4 (SEVERE) (HCC): Status: ACTIVE | Noted: 2018-02-11

## 2018-02-11 LAB
ALBUMIN SERPL BCP-MCNC: 2.8 G/DL (ref 3.5–5)
ALP SERPL-CCNC: 73 U/L (ref 46–116)
ALT SERPL W P-5'-P-CCNC: 22 U/L (ref 12–78)
ANION GAP SERPL CALCULATED.3IONS-SCNC: 11 MMOL/L (ref 4–13)
ANION GAP SERPL CALCULATED.3IONS-SCNC: 8 MMOL/L (ref 4–13)
ANION GAP SERPL CALCULATED.3IONS-SCNC: 9 MMOL/L (ref 4–13)
AST SERPL W P-5'-P-CCNC: 15 U/L (ref 5–45)
BILIRUB SERPL-MCNC: 0.3 MG/DL (ref 0.2–1)
BUN SERPL-MCNC: 32 MG/DL (ref 5–25)
BUN SERPL-MCNC: 34 MG/DL (ref 5–25)
BUN SERPL-MCNC: 38 MG/DL (ref 5–25)
CALCIUM SERPL-MCNC: 8.5 MG/DL (ref 8.3–10.1)
CALCIUM SERPL-MCNC: 8.5 MG/DL (ref 8.3–10.1)
CALCIUM SERPL-MCNC: 8.6 MG/DL (ref 8.3–10.1)
CHLORIDE SERPL-SCNC: 110 MMOL/L (ref 100–108)
CHLORIDE SERPL-SCNC: 111 MMOL/L (ref 100–108)
CHLORIDE SERPL-SCNC: 112 MMOL/L (ref 100–108)
CO2 SERPL-SCNC: 20 MMOL/L (ref 21–32)
CO2 SERPL-SCNC: 21 MMOL/L (ref 21–32)
CO2 SERPL-SCNC: 22 MMOL/L (ref 21–32)
CREAT SERPL-MCNC: 3.72 MG/DL (ref 0.6–1.3)
CREAT SERPL-MCNC: 3.82 MG/DL (ref 0.6–1.3)
CREAT SERPL-MCNC: 3.9 MG/DL (ref 0.6–1.3)
ERYTHROCYTE [DISTWIDTH] IN BLOOD BY AUTOMATED COUNT: 13.2 % (ref 11.6–15.1)
GFR SERPL CREATININE-BSD FRML MDRD: 19 ML/MIN/1.73SQ M
GFR SERPL CREATININE-BSD FRML MDRD: 19 ML/MIN/1.73SQ M
GFR SERPL CREATININE-BSD FRML MDRD: 20 ML/MIN/1.73SQ M
GLUCOSE SERPL-MCNC: 153 MG/DL (ref 65–140)
GLUCOSE SERPL-MCNC: 156 MG/DL (ref 65–140)
GLUCOSE SERPL-MCNC: 158 MG/DL (ref 65–140)
GLUCOSE SERPL-MCNC: 166 MG/DL (ref 65–140)
GLUCOSE SERPL-MCNC: 188 MG/DL (ref 65–140)
GLUCOSE SERPL-MCNC: 207 MG/DL (ref 65–140)
GLUCOSE SERPL-MCNC: 273 MG/DL (ref 65–140)
HCT VFR BLD AUTO: 23.4 % (ref 36.5–49.3)
HGB BLD-MCNC: 7.7 G/DL (ref 12–17)
MAGNESIUM SERPL-MCNC: 1.9 MG/DL (ref 1.6–2.6)
MCH RBC QN AUTO: 30.8 PG (ref 26.8–34.3)
MCHC RBC AUTO-ENTMCNC: 32.9 G/DL (ref 31.4–37.4)
MCV RBC AUTO: 94 FL (ref 82–98)
PHOSPHATE SERPL-MCNC: 4.4 MG/DL (ref 2.7–4.5)
PLATELET # BLD AUTO: 189 THOUSANDS/UL (ref 149–390)
PMV BLD AUTO: 10 FL (ref 8.9–12.7)
POTASSIUM SERPL-SCNC: 5.4 MMOL/L (ref 3.5–5.3)
POTASSIUM SERPL-SCNC: 5.5 MMOL/L (ref 3.5–5.3)
POTASSIUM SERPL-SCNC: 5.8 MMOL/L (ref 3.5–5.3)
PROT SERPL-MCNC: 5.4 G/DL (ref 6.4–8.2)
RBC # BLD AUTO: 2.5 MILLION/UL (ref 3.88–5.62)
SODIUM SERPL-SCNC: 141 MMOL/L (ref 136–145)
SODIUM SERPL-SCNC: 141 MMOL/L (ref 136–145)
SODIUM SERPL-SCNC: 142 MMOL/L (ref 136–145)
WBC # BLD AUTO: 9.49 THOUSAND/UL (ref 4.31–10.16)

## 2018-02-11 PROCEDURE — 71045 X-RAY EXAM CHEST 1 VIEW: CPT

## 2018-02-11 PROCEDURE — 80053 COMPREHEN METABOLIC PANEL: CPT | Performed by: PHYSICIAN ASSISTANT

## 2018-02-11 PROCEDURE — 85027 COMPLETE CBC AUTOMATED: CPT | Performed by: PHYSICIAN ASSISTANT

## 2018-02-11 PROCEDURE — 93975 VASCULAR STUDY: CPT

## 2018-02-11 PROCEDURE — 99233 SBSQ HOSP IP/OBS HIGH 50: CPT | Performed by: INTERNAL MEDICINE

## 2018-02-11 PROCEDURE — 83835 ASSAY OF METANEPHRINES: CPT | Performed by: INTERNAL MEDICINE

## 2018-02-11 PROCEDURE — 82948 REAGENT STRIP/BLOOD GLUCOSE: CPT

## 2018-02-11 PROCEDURE — 83735 ASSAY OF MAGNESIUM: CPT | Performed by: PHYSICIAN ASSISTANT

## 2018-02-11 PROCEDURE — 80048 BASIC METABOLIC PNL TOTAL CA: CPT | Performed by: PHYSICIAN ASSISTANT

## 2018-02-11 PROCEDURE — 99233 SBSQ HOSP IP/OBS HIGH 50: CPT | Performed by: PHYSICIAN ASSISTANT

## 2018-02-11 PROCEDURE — 84100 ASSAY OF PHOSPHORUS: CPT | Performed by: PHYSICIAN ASSISTANT

## 2018-02-11 RX ORDER — SODIUM POLYSTYRENE SULFONATE 15 G/60ML
15 SUSPENSION ORAL; RECTAL ONCE
Status: DISCONTINUED | OUTPATIENT
Start: 2018-02-11 | End: 2018-02-12

## 2018-02-11 RX ORDER — NIFEDIPINE 30 MG/1
60 TABLET, EXTENDED RELEASE ORAL
Status: DISCONTINUED | OUTPATIENT
Start: 2018-02-11 | End: 2018-02-19 | Stop reason: HOSPADM

## 2018-02-11 RX ORDER — ERGOCALCIFEROL 1.25 MG/1
50000 CAPSULE ORAL WEEKLY
Status: DISCONTINUED | OUTPATIENT
Start: 2018-02-16 | End: 2018-02-19 | Stop reason: HOSPADM

## 2018-02-11 RX ORDER — HYDRALAZINE HYDROCHLORIDE 20 MG/ML
10 INJECTION INTRAMUSCULAR; INTRAVENOUS EVERY 6 HOURS PRN
Status: DISCONTINUED | OUTPATIENT
Start: 2018-02-11 | End: 2018-02-19 | Stop reason: HOSPADM

## 2018-02-11 RX ORDER — HYDRALAZINE HYDROCHLORIDE 25 MG/1
100 TABLET, FILM COATED ORAL EVERY 8 HOURS SCHEDULED
Status: DISCONTINUED | OUTPATIENT
Start: 2018-02-11 | End: 2018-02-13

## 2018-02-11 RX ORDER — HYDRALAZINE HYDROCHLORIDE 20 MG/ML
20 INJECTION INTRAMUSCULAR; INTRAVENOUS EVERY 8 HOURS PRN
Status: DISCONTINUED | OUTPATIENT
Start: 2018-02-11 | End: 2018-02-11

## 2018-02-11 RX ORDER — SODIUM CHLORIDE 9 MG/ML
125 INJECTION, SOLUTION INTRAVENOUS CONTINUOUS
Status: DISCONTINUED | OUTPATIENT
Start: 2018-02-11 | End: 2018-02-11

## 2018-02-11 RX ORDER — SODIUM POLYSTYRENE SULFONATE 15 G/60ML
30 SUSPENSION ORAL; RECTAL ONCE
Status: COMPLETED | OUTPATIENT
Start: 2018-02-11 | End: 2018-02-11

## 2018-02-11 RX ADMIN — HEPARIN SODIUM 5000 UNITS: 5000 INJECTION, SOLUTION INTRAVENOUS; SUBCUTANEOUS at 14:08

## 2018-02-11 RX ADMIN — INSULIN LISPRO 4 UNITS: 100 INJECTION, SOLUTION INTRAVENOUS; SUBCUTANEOUS at 14:59

## 2018-02-11 RX ADMIN — HYDRALAZINE HYDROCHLORIDE 100 MG: 25 TABLET, FILM COATED ORAL at 14:07

## 2018-02-11 RX ADMIN — NIFEDIPINE 60 MG: 30 TABLET, FILM COATED, EXTENDED RELEASE ORAL at 08:22

## 2018-02-11 RX ADMIN — CLOPIDOGREL BISULFATE 75 MG: 75 TABLET ORAL at 08:22

## 2018-02-11 RX ADMIN — ESCITALOPRAM OXALATE 10 MG: 10 TABLET ORAL at 08:22

## 2018-02-11 RX ADMIN — INSULIN LISPRO 4 UNITS: 100 INJECTION, SOLUTION INTRAVENOUS; SUBCUTANEOUS at 12:11

## 2018-02-11 RX ADMIN — ATORVASTATIN CALCIUM 40 MG: 40 TABLET, FILM COATED ORAL at 17:27

## 2018-02-11 RX ADMIN — SODIUM CHLORIDE 10 MG/HR: 0.9 INJECTION, SOLUTION INTRAVENOUS at 00:34

## 2018-02-11 RX ADMIN — HYDRALAZINE HYDROCHLORIDE 10 MG: 20 INJECTION INTRAMUSCULAR; INTRAVENOUS at 16:47

## 2018-02-11 RX ADMIN — INSULIN GLARGINE 6 UNITS: 100 INJECTION, SOLUTION SUBCUTANEOUS at 08:23

## 2018-02-11 RX ADMIN — INSULIN GLARGINE 6 UNITS: 100 INJECTION, SOLUTION SUBCUTANEOUS at 21:55

## 2018-02-11 RX ADMIN — CARVEDILOL 25 MG: 12.5 TABLET, FILM COATED ORAL at 08:22

## 2018-02-11 RX ADMIN — INSULIN LISPRO 2 UNITS: 100 INJECTION, SOLUTION INTRAVENOUS; SUBCUTANEOUS at 16:23

## 2018-02-11 RX ADMIN — INSULIN LISPRO 1 UNITS: 100 INJECTION, SOLUTION INTRAVENOUS; SUBCUTANEOUS at 21:57

## 2018-02-11 RX ADMIN — HEPARIN SODIUM 5000 UNITS: 5000 INJECTION, SOLUTION INTRAVENOUS; SUBCUTANEOUS at 21:55

## 2018-02-11 RX ADMIN — HYDRALAZINE HYDROCHLORIDE 100 MG: 25 TABLET, FILM COATED ORAL at 06:04

## 2018-02-11 RX ADMIN — HEPARIN SODIUM 5000 UNITS: 5000 INJECTION, SOLUTION INTRAVENOUS; SUBCUTANEOUS at 06:05

## 2018-02-11 RX ADMIN — INSULIN LISPRO 4 UNITS: 100 INJECTION, SOLUTION INTRAVENOUS; SUBCUTANEOUS at 16:17

## 2018-02-11 RX ADMIN — INSULIN LISPRO 1 UNITS: 100 INJECTION, SOLUTION INTRAVENOUS; SUBCUTANEOUS at 08:47

## 2018-02-11 RX ADMIN — INSULIN LISPRO 4 UNITS: 100 INJECTION, SOLUTION INTRAVENOUS; SUBCUTANEOUS at 08:48

## 2018-02-11 RX ADMIN — SODIUM CHLORIDE 2.5 MG/HR: 0.9 INJECTION, SOLUTION INTRAVENOUS at 08:53

## 2018-02-11 RX ADMIN — NIFEDIPINE 60 MG: 30 TABLET, FILM COATED, EXTENDED RELEASE ORAL at 16:18

## 2018-02-11 RX ADMIN — SODIUM CHLORIDE 5 MG/HR: 0.9 INJECTION, SOLUTION INTRAVENOUS at 02:36

## 2018-02-11 RX ADMIN — FOLIC ACID 1 MG: 1 TABLET ORAL at 08:22

## 2018-02-11 RX ADMIN — SODIUM CHLORIDE 7.5 MG/HR: 0.9 INJECTION, SOLUTION INTRAVENOUS at 06:03

## 2018-02-11 RX ADMIN — HYDRALAZINE HYDROCHLORIDE 100 MG: 25 TABLET, FILM COATED ORAL at 21:54

## 2018-02-11 RX ADMIN — CARVEDILOL 25 MG: 12.5 TABLET, FILM COATED ORAL at 16:17

## 2018-02-11 RX ADMIN — SODIUM CHLORIDE 5 MG/HR: 0.9 INJECTION, SOLUTION INTRAVENOUS at 03:46

## 2018-02-11 RX ADMIN — SODIUM POLYSTYRENE SULFONATE 30 G: 15 SUSPENSION ORAL; RECTAL at 09:39

## 2018-02-11 RX ADMIN — ASPIRIN 81 MG 162 MG: 81 TABLET ORAL at 08:22

## 2018-02-11 NOTE — CONSULTS
Consultation - Nephrology   Félix Steel 27 y o  male MRN: [id number]  Unit/Bed#:  Encounter: [id number]      Assessment/Plan:  1  Acute kidney injury, likely secondary to accelerated hypertension  2  Stage 4 chronic kidney disease, last discharge creatinine 3 3-3 4, complements normal, Anca negative, CARMEN negative, SPEP-no monoclonal gammopathy, rheumatoid factor negative, hepatitis-C negative  3  Hyperkalemia, secondary to renal failure, dietary indiscretion  4  Accelerated hypertension, some component of dietary indiscretion, compliance appears adequate  5  Recent CVA, with visual disturbances  6  Previous stage 3 chronic kidney disease, previous baseline 2 2-2 4  7  Nephrotic range proteinuria, suspecting diabetic nephropathy, previous serologic workup negative  8  Anemia of chronic disease    Plan:  · Agree with nicardipine drip, gradual reduction of blood pressure over the next 24-48 hours  · Resume antihypertensives including Coreg, hydralazine as well as nifedipine, although will increase nifedipine to 60 mg b i d , if no significant improvement of blood pressure may consider either alpha blocker or clonidine  · Given Lasix 80 mg x1 secondary to hyperkalemia, will give 1-2 L of saline given accelerated hypertension and renal failure  · Already given insulin, calcium, Kayexalate as well as Lasix  · Given refractory in resistant hypertension, check plasma metanephrines  Also will check renal Doppler are looking for any evidence of renal artery stenosis  · Continue to monitor renal function and potassium levels closely    History of Present Illness   Physician Requesting Consult: Leonela Crowell MD  Reason for Consult / Principal Problem:  Acute renal failure, hyperkalemia and accelerated hypertension  HPI: Félix Steel is a 27y o  year old male who presents with worsening hypertension      Patient is a 68-year-old male who presents to [facility] with complaints of accelerated hypertension  Patient has been checking his blood pressure since discharge  Systolic blood pressures have been noted greater than 200  He really did not have an a new complaints including headache, worsening visual changes, chest pain, shortness of breath, abdominal pain or worsening lower extremity swelling  Denies any active chest pain or shortness of Breath  He does admit to possibly some dietary indiscretion next specially with potassium as well as with sodium  History obtained from chart review and the patient    Review of Systems   Constitutional: Negative for appetite change and fatigue  Eyes: Positive for visual disturbance  Respiratory: Negative for cough and shortness of breath  Cardiovascular: Positive for leg swelling  Negative for chest pain  Gastrointestinal: Negative for abdominal distention, abdominal pain, diarrhea, nausea and vomiting  Genitourinary: Negative for dysuria, flank pain, frequency and hematuria  Musculoskeletal: Negative for arthralgias, back pain and myalgias  Skin: Negative for wound  Neurological: Negative for dizziness, syncope, weakness and light-headedness  Psychiatric/Behavioral: Negative for confusion  All other systems reviewed and are negative        Review of Systems: pertinent findings as noted above otherwise negative    Historical Information   Patient Active Problem List   Diagnosis    Cerebrovascular accident (CVA) of left pontine structure ([facility] [address] )    Acute renal failure superimposed on stage 3 chronic kidney disease ([facility] [address] )    Hypertensive urgency    Type 1 diabetes mellitus with diabetic nephropathy, with long-term current use of insulin ([facility])    History of lacunar cerebrovascular accident (CVA)    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,      Binocular visual disturbance    Vitamin D deficiency    Hyperhomocysteinemia ([facility] [address] )    Accelerated hypertension    Acute hyperkalemia    Anemia in stage 3 chronic kidney disease    Leukocytosis (leucocytosis)     Past Medical History:   Diagnosis Date    Cerebellar stroke side undetermined 2015 2013    Diabetes type 1, controlled ([facility] [address] )     Hypertension      History reviewed  No pertinent surgical history  Social History   History   Alcohol Use    Yes     Comment: socially     History   Drug Use    Frequency: 3 0 times per week    Types: Marijuana     Comment: weekly     History   Smoking Status    Current Every Day Smoker    Packs/day: 0 50    Types: Cigarettes   Smokeless Tobacco    Never Used     History reviewed  No pertinent family history      Meds/Allergies   current meds:   Current Facility-Administered Medications   Medication Dose Route Frequency    [START ON 2/11/2018] aspirin chewable tablet 162 mg  162 mg Oral Daily    atorvastatin (LIPITOR) tablet 40 mg  40 mg Oral QPM    [START ON 2/11/2018] carvedilol (COREG) tablet 25 mg  25 mg Oral BID With Meals    chlorhexidine (PERIDEX) 0 12 % oral rinse 15 mL  15 mL Swish & Spit Q12H [address]    [START ON 2/11/2018] clopidogrel (PLAVIX) tablet 75 mg  75 mg Oral Daily    [START ON 2/11/2018] escitalopram (LEXAPRO) tablet 10 mg  10 mg Oral Daily    [START ON 9/99/7201] folic acid (FOLVITE) tablet 1 mg  1 mg Oral Daily    heparin (porcine) subcutaneous injection 5,000 Units  5,000 Units Subcutaneous Q8H [address]    hydrALAZINE (APRESOLINE) tablet 100 mg  100 mg Oral Q8H [address]    [START ON 2/11/2018] insulin glargine (LANTUS) subcutaneous injection 6 Units  6 Units Subcutaneous Daily With Breakfast    insulin glargine (LANTUS) subcutaneous injection 6 Units  6 Units Subcutaneous HS    insulin lispro (HumaLOG) 100 units/mL subcutaneous injection 1-5 Units  1-5 Units Subcutaneous HS    [START ON 2/11/2018] insulin lispro (HumaLOG) 100 units/mL subcutaneous injection 1-6 Units  1-6 Units Subcutaneous TID AC    [START ON 2/11/2018] insulin lispro (HumaLOG) 100 units/mL subcutaneous injection 4 Units  4 Units Subcutaneous Daily With Breakfast    [START ON 2/11/2018] insulin lispro (HumaLOG) 100 units/mL subcutaneous injection 4 Units  4 Units Subcutaneous Daily With Lunch    [START ON 2/11/2018] insulin lispro (HumaLOG) 100 units/mL subcutaneous injection 4 Units  4 Units Subcutaneous Daily With Dinner    niCARdipine (CARDENE) 25 mg (STANDARD CONCENTRATION) in sodium chloride 0 9% 250 mL  5 mg/hr Intravenous Titrated    [START ON 2/11/2018] NIFEdipine (PROCARDIA XL) 24 hr tablet 60 mg  60 mg Oral BID (diuretic)    [START ON 2/11/2018] torsemide (DEMADEX) tablet 20 mg  20 mg Oral Daily       Allergies   Allergen Reactions    Sulfa Antibiotics Rash         Objective   BP (!) 185/92 (BP Location: Left arm)   Pulse 90   Temp 98 3 °F (36 8 °C) (Oral)   Resp 20   Ht 5' 11" (1 803 m)   Wt 103 kg (226 lb 10 1 oz)   SpO2 97%   BMI 31 61 kg/m²     Intake/Output Summary (Last 24 hours) at 02/10/18 2216  Last data filed at 02/10/18 2129   Gross per 24 hour   Intake             1000 ml   Output              840 ml   Net              160 ml       Current Weight: Weight - Scale: 103 kg (226 lb 10 1 oz)    Physical Exam   Constitutional: He is oriented to person, place, and time  He appears well-developed  No distress  HENT:   Head: Normocephalic  Mouth/Throat: Oropharynx is clear and moist    Eyes: Conjunctivae are normal  No scleral icterus  Neck: Neck supple  No JVD present  Cardiovascular: Normal rate, regular rhythm and normal heart sounds  Pulmonary/Chest: Effort normal and breath sounds normal  No respiratory distress  He has no wheezes  He has no rales  Abdominal: Soft  There is no tenderness  Musculoskeletal: He exhibits no edema (+1-2 edema bilaterally)  Neurological: He is alert and oriented to person, place, and time  Skin: Skin is warm and dry  No rash noted  Psychiatric: He has a normal mood and affect  His behavior is normal    Nursing note and vitals reviewed          Lab Results:      Results from last 7 days  Lab Units 02/10/18  2208 02/10/18  1744   WBC Thousand/uL  --  10 89*   HEMOGLOBIN g/dL  --  8 7*   HEMATOCRIT %  --  26 2*   PLATELETS Thousands/uL 185 261       Results from last 7 days  Lab Units 02/10/18  1925 02/10/18  1837   SODIUM mmol/L  --  138   POTASSIUM mmol/L 7 1* 7 4*   CHLORIDE mmol/L  --  108   CO2 mmol/L  --  22   BUN mg/dL  --  38*   CREATININE mg/dL  --  4 03*   GLUCOSE RANDOM mg/dL  --  233*   CALCIUM mg/dL  --  8 5

## 2018-02-11 NOTE — PLAN OF CARE
CARDIOVASCULAR - ADULT     Maintains optimal cardiac output and hemodynamic stability Progressing     Absence of cardiac dysrhythmias or at baseline rhythm Progressing        DISCHARGE PLANNING     Discharge to home or other facility with appropriate resources Progressing        INFECTION - ADULT     Absence or prevention of progression during hospitalization Progressing     Absence of fever/infection during neutropenic period Progressing        Knowledge Deficit     Patient/family/caregiver demonstrates understanding of disease process, treatment plan, medications, and discharge instructions Progressing        METABOLIC, FLUID AND ELECTROLYTES - ADULT     Electrolytes maintained within normal limits Progressing     Fluid balance maintained Progressing     Glucose maintained within target range Progressing        PAIN - ADULT     Verbalizes/displays adequate comfort level or baseline comfort level Progressing        SAFETY ADULT     Patient will remain free of falls Progressing     Maintain or return to baseline ADL function Progressing     Maintain or return mobility status to optimal level Progressing

## 2018-02-11 NOTE — PROGRESS NOTES
Progress Note - Critical Care   Swati Mckeon 27 y o  male MRN: [id number]  Unit/Bed#:  Encounter: [id number]    Impression:  Principal Problem:    Hypertensive urgency  Active Problems:    Acute renal failure superimposed on stage 3 chronic kidney disease (HCC)    Type 1 diabetes mellitus with diabetic nephropathy, with long-term current use of insulin (HCC)    History of lacunar cerebrovascular accident (CVA)    Hyperhomocysteinemia (HCC)    Accelerated hypertension    Acute hyperkalemia    Anemia in stage 3 chronic kidney disease    Leukocytosis (leucocytosis)  Resolved Problems:    Nausea      Plan:  Neuro:    pain control with PO pain medication   CAM-ICU score daily   Delirium precautions   Patient was discharged 2/5/2018  In July of 2015 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 and also heterozygote for prothrombin gene mutation   Neurology was seeing him on last admission and he has follow-ups as outpatient with Neurology for the past admission for CVA  Patient has deficit on the left side from last admission stroke, visual impairment which is improving        CV:   Control BP with home medications and start Cardene drip titrated down to 5 mg from 12 5  He had an ultrasound of the kidneys last admission which was normal  HR controlled   continue ASA/ PLavix    Hydralazine 100 mg q 8, coreg 25 mg b i d , torsemide 40 mg daily, and nifedipine 60 mg b i d     Lung:   Incentive spirometer  Wean FIO2 for PO2 > 92 %   Respiratory protocol  HOB > 30         GI:   PPIs - continue home treatment  Diet on diabetic diet carb controlled, renal diet     FEN: Replete electrolytes as needed, DVT prophylaxis     : Monitor ins and outs  Creatinine increased to 4  Potassium was 7 4  Treating acute hyperkalemia, volume resuscitation, with Lasix, D50 insulin and Kayexalate  Repeat checkup in 4 hours     Nephrology is following for possible dialysis as needed  Creatinine baseline is 2 4 now at [facility]   Dominguez Michael  Outpatient nephrology   Arterial duplexes complete for kidney pending      ID: Monitor white count and temperature  No acute issues  Heme:   monitor hemoglobin and white count baseline  anemia due to chronic rate kidney disease  Heme-Onc is following outpatient      Endo: Insulin sliding scale, with meal homelog and Lantus is p m  and a m      MSK:  Moves all extremities  Disposition:  Step-down level 1    Reason for Admission:  Hyperkalemia and hypertension    HPI/24hr events:   Able to reduce Cardene during the night, potassium decreased to 5 5 after treatment,   Nephrology saw patient in consult        Physical Exam     General:  Appears stated age of 27 , in no acute distress, well nourished   Head: NC/ AT   HEENT:  PERRLA, dentition good, left-sided facial droop, visual impairment on the left eye,   Neck: supple, no JVD, carotids 2 +, trachea midline   Chest: Clear to auscultation bilaterally, no rhonchi  No wheezing  No rales,   No tenderness to palpitation, atraumatic no bruising  CV: RRR, no murmurs, S1/ S2 normal, no rubs  Abd: Soft and non tender, non distended, bowel sounds are present, no guarding or rebound, no abdominal bruit  MSK: Normal range of motion of neck shoulder elbow and wrists, good  strength, normal range of motions of hip knee ankle and feet  Range of motion of spine not tested  Pulses: +2 femoral, no bruit, brachial, radial, DP/ PT +2    Neuro: Alert and oriented ×3,   Sensation intact  Patient has known deficit on the left side from previous stroke    Vitals:   Vitals:    18 0230 18 0245 18 0300 18 0315   BP: 153/72 150/74 150/78 152/76   Pulse: 81 85 86 85   Resp: 20 (!) 23 20 19   Temp:       TempSrc:       SpO2: 94% 96% 96% 95%   Weight:       Height:                 Tele Rhythm: NSR This was personally reviewed by myself      Temperature: Temp (24hrs), Av 3 °F (36 8 °C), Min:98 3 °F (36 8 °C), Max:98 3 °F (36 8 °C)  Current: Temperature: 98 3 °F (36 8 °C)    Weights: IBW: 75 3 kg  Body mass index is 31 61 kg/m²  Hemodynamic Monitoring:  N/A       Respiratory:  SpO2: SpO2: 95 %, SpO2 Activity: SpO2 Activity: At Rest, SpO2 Device: O2 Device: None (Room air)       Intake and Outputs:    Intake/Output Summary (Last 24 hours) at 02/11/18 0445  Last data filed at 02/11/18 0251   Gross per 24 hour   Intake          1685 83 ml   Output             1165 ml   Net           520 83 ml     I/O last 24 hours: In: 1685 8 [P O :240; I V :445 8; IV Piggyback:1000]  Out: 6682 [Urine:1165]        Nutrition:        Diet Orders            Start     Ordered    02/10/18 2155  Diet Renal; PreRenal (no dialysis); Consistent Carbohydrate Diet Level 2 (5 carb servings/75 grams CHO/meal)  Diet effective now     Question Answer Comment   Diet Type Renal    Renal PreRenal (no dialysis)    Other Restriction(s): Consistent Carbohydrate Diet Level 2 (5 carb servings/75 grams CHO/meal)    RD to adjust diet per protocol?  No        02/10/18 2154          Labs:     Results from last 7 days  Lab Units 02/11/18  0254 02/10/18  2208 02/10/18  1744   WBC Thousand/uL 9 49  --  10 89*   HEMOGLOBIN g/dL 7 7*  --  8 7*   HEMATOCRIT % 23 4*  --  26 2*   PLATELETS Thousands/uL 189 185 261   NEUTROS PCT %  --   --  76*   MONOS PCT %  --   --  4       Results from last 7 days  Lab Units 02/11/18  0254 02/10/18  2208 02/10/18  1925 02/10/18  1837   SODIUM mmol/L 141 142  --  138   POTASSIUM mmol/L 5 5* 5 8* 7 1* 7 4*   CHLORIDE mmol/L 111* 109*  --  108   CO2 mmol/L 22 21  --  22   BUN mg/dL 38* 37*  --  38*   CREATININE mg/dL 3 90* 3 86*  --  4 03*   CALCIUM mg/dL 8 5 9 0  --  8 5   TOTAL PROTEIN g/dL 5 4* 5 6*  --   --    BILIRUBIN TOTAL mg/dL 0 30 0 40  --   --    ALK PHOS U/L 73 78  --   --    ALT U/L 22 26  --   --    AST U/L 15 16  --   --    GLUCOSE RANDOM mg/dL 153* 235*  --  233*       Results from last 7 days  Lab Units 02/11/18  0254 02/10/18  2208   MAGNESIUM mg/dL 1 9 2 0       Results from last 7 days  Lab Units 02/11/18  0254 02/05/18  0503   PHOSPHORUS mg/dL 4 4 4 0                0  Lab Value Date/Time   TROPONINI 0 03 02/10/2018 2208   TROPONINI 0 02 02/10/2018 1744   TROPONINI 0 03 01/22/2018 2130     ABG:        Micro:   Blood Culture: No results found for: BLOODCX  Urine Culture: No results found for: URINECX  Sputum Culture: No components found for: SPUTUMCX  Wound Culure: No results found for: WOUNDCULT        Allergies:    Allergies   Allergen Reactions    Sulfa Antibiotics Rash       Medications:   Scheduled Meds:    Current Facility-Administered Medications:  aspirin 162 mg Oral Daily Sindi Vergara PA-C    atorvastatin 40 mg Oral QPM Sindi Vergara PA-C    carvedilol 25 mg Oral BID With Meals Sindi Vergara PA-C    chlorhexidine 15 mL Swish & Spit Q12H [address] Sindi Vergara PA-C    clopidogrel 75 mg Oral Daily Sindi Vergara PA-C    escitalopram 10 mg Oral Daily Sindi Vergara PA-C    folic acid 1 mg Oral Daily Sindi Vergara PA-C    heparin (porcine) 5,000 Units Subcutaneous Q8H [address] Sindi Vergara PA-C    hydrALAZINE 100 mg Oral Q8H [address] Sindi Vergara PA-C    insulin glargine 6 Units Subcutaneous Daily With Breakfast Sindi Vergara PA-C    insulin glargine 6 Units Subcutaneous HS Sindi Vergara PA-C    insulin lispro 1-5 Units Subcutaneous HS Sindi Vergara PA-C    insulin lispro 1-6 Units Subcutaneous TID AC Sindi Vergara PA-C    insulin lispro 4 Units Subcutaneous Daily With Breakfast Sindi Vergara PA-C    insulin lispro 4 Units Subcutaneous Daily With Lunch Sindi Vergara PA-C    insulin lispro 4 Units Subcutaneous Daily With Dinner Heidimarie Opperman, PA-C    niCARdipine 5 mg/hr Intravenous Titrated Frankie Smith MD Last Rate: 5 mg/hr (02/11/18 0346)   NIFEdipine ER 60 mg Oral BID (diuretic) Abena Lester Wendall Oppenheim,     sodium chloride 125 mL/hr Intravenous Continuous Sindi Vergara PA-C    torsemide 20 mg Oral Daily Sindi Vergara PA-C        VTE Pharmacologic Prophylaxis: Sequential compression device (Venodyne)  and Heparin  VTE Mechanical Prophylaxis: sequential compression device    Continuous Infusions:    niCARdipine 5 mg/hr Last Rate: 5 mg/hr (02/11/18 0346)   sodium chloride 125 mL/hr      PRN Meds:       Invasive lines and devices: Invasive Devices     Peripheral Intravenous Line            Peripheral IV 02/10/18 Left Antecubital less than 1 day    Peripheral IV 02/10/18 Right Antecubital less than 1 day                Code Status: Level 1 - Full Code    Counseling / Coordination of Care  Total Critical Care time spent 30 minutes excluding procedures, teaching and family updates        Yoselin Alexander PA-C  DATE: February 11, 2018  TIME: 4:45 AM

## 2018-02-11 NOTE — PROGRESS NOTES
NEPHROLOGY PROGRESS NOTE   Willow Mckeon 27 y o  male MRN: [id number]  Unit/Bed#:  Encounter: [id number]  Reason for Consult: LLUVIA/CKD    ASSESSMENT/PLAN:  1  Acute Kidney Injury- secondary to hemodynamic changes with accelerated hypertension  - creatinine slightly improved from admission  - continue IVF  - avoid nephrotoxics and contrast  - recent previous contrast induced nephropathy/ATN  2  Chronic Kidney Disease stage IV- Baseline creatinine previously was in the low 2s but upon discharge from the hospital last month was 3 3-3 4  Follow up with Dr Pedrito Hairston  Continue to trend creatinine to determine baseline  No need for dialysis at this time  Etiology likely due to diabetic nephropathy  - previous workup negative: CARMEN, ANCA, SPEP, UPEP, C3, C4, hepatitis  3  Accelerated Hypertension- BP improved greatly from admission with nicardipine drip (titration per critical care)  - antihypertensives: coreg 25mg BID, hydralazine 100mg TID, nifedipine 60mg BID  - nifedipine was increased to BID on 2/11/18  - hold torsemide for now  - workup pending: metanephrines & renal artery doppler  4  Proteinuria- Suspected secondary to diabetic nephropathy  Follow as an outpatient  - not on ACEI/ARB due to LLUVIA and advanced renal disease  5  Hyperkalemia- secondary to dietary indiscretion and LLUVIA  - low K diet  - medically treat  6  Anemia of chronic disease- hgb trending down  - avoid epogen with recent CVA and prior history of CVA  - transfuse for hgb <7  - iron studies in January were WNL  7  Recent CVA / ? Demyelinating Disease  8  BMD- phos acceptable  - ergocalciferol weekly until 4/13  9  Type 1 Diabetes Mellitus- diagnosed at age of 1    SUBJECTIVE:  Patient in bed in no acute distress  Mother at bedside  Discussed dietary instructions for potassium and sodium      OBJECTIVE:  Current Weight: Weight - Scale: 103 kg (227 lb 1 2 oz)  Vitals:    02/11/18 0615 02/11/18 0630 02/11/18 0645 02/11/18 0726   BP: 160/78 166/79 155/73 154/74   BP Location:    Left arm   Pulse: 90 95 88 94   Resp: 18 12 18 13   Temp:    98 3 °F (36 8 °C)   TempSrc:    Oral   SpO2: 96% 97% 96% 96%   Weight:       Height:           Intake/Output Summary (Last 24 hours) at 02/11/18 0844  Last data filed at 02/11/18 0800   Gross per 24 hour   Intake          3491 66 ml   Output             2165 ml   Net          1326 66 ml     General: NAD  Skin: no rash  HEENT: normocephalic atraumatic  Neck: supple  Chest: CTAB  Heart: RRR  Abdomen: soft, nt, nd  Extremities: trace edema  Neuro: alert awake  Psych: mood and affect appropriate    Medications:    Current Facility-Administered Medications:     aspirin chewable tablet 162 mg, 162 mg, Oral, Daily, Sindi Vergara PA-C, 162 mg at 02/11/18 0822    atorvastatin (LIPITOR) tablet 40 mg, 40 mg, Oral, QPM, Sindi Vergara PA-C, 40 mg at 02/10/18 2246    carvedilol (COREG) tablet 25 mg, 25 mg, Oral, BID With Meals, Sindi Vergara PA-C, 25 mg at 02/11/18 0822    clopidogrel (PLAVIX) tablet 75 mg, 75 mg, Oral, Daily, Sindi Vergara PA-C, 75 mg at 02/11/18 3206    escitalopram (LEXAPRO) tablet 10 mg, 10 mg, Oral, Daily, Sindi Vergara PA-C, 10 mg at 33/79/82 6297    folic acid (FOLVITE) tablet 1 mg, 1 mg, Oral, Daily, Sindi Vergara PA-C, 1 mg at 02/11/18 3525    heparin (porcine) subcutaneous injection 5,000 Units, 5,000 Units, Subcutaneous, Q8H [facility], 5,000 Units at 02/11/18 0605 **AND** Platelet count, , , Once, Sindi Vergara PA-C    hydrALAZINE (APRESOLINE) tablet 100 mg, 100 mg, Oral, Q8H HALIE, AURELIANO Rodas-CLOTILDE, 100 mg at 02/11/18 0604    insulin glargine (LANTUS) subcutaneous injection 6 Units, 6 Units, Subcutaneous, Daily With Breakfast, Sindi Vergara PA-C, 6 Units at 02/11/18 0823    insulin glargine (LANTUS) subcutaneous injection 6 Units, 6 Units, Subcutaneous, HS, Sindi Vergara PA-C, 6 Units at 02/10/18 2220    insulin lispro (HumaLOG) 100 units/mL subcutaneous injection 1-5 Units, 1-5 Units, Subcutaneous, HS, Sindi Vergara PA-C, 2 Units at 02/10/18 2220    insulin lispro (HumaLOG) 100 units/mL subcutaneous injection 1-6 Units, 1-6 Units, Subcutaneous, TID AC **AND** Fingerstick Glucose (POCT), , , TID AC, Sindi Vergara PA-C    insulin lispro (HumaLOG) 100 units/mL subcutaneous injection 4 Units, 4 Units, Subcutaneous, Daily With Breakfast, Sindi Vergara PA-C    insulin lispro (HumaLOG) 100 units/mL subcutaneous injection 4 Units, 4 Units, Subcutaneous, Daily With Lunch, Sindi Vergara PA-C    insulin lispro (HumaLOG) 100 units/mL subcutaneous injection 4 Units, 4 Units, Subcutaneous, Daily With Dinner, Sindi Vergara PA-C    niCARdipine (CARDENE) 25 mg (STANDARD CONCENTRATION) in sodium chloride 0 9% 250 mL, 5 mg/hr, Intravenous, Titrated, Elizabeth Schmitz MD, Last Rate: 50 mL/hr at 02/11/18 0613, 5 mg/hr at 02/11/18 6162    NIFEdipine (PROCARDIA XL) 24 hr tablet 60 mg, 60 mg, Oral, BID (diuretic), Juliene Scheuermann Dunn, DO, 60 mg at 02/11/18 4854    sodium chloride 0 9 % infusion, 125 mL/hr, Intravenous, Continuous, Sindi Vergara PA-C, Last Rate: 125 mL/hr at 02/10/18 2145, 125 mL/hr at 02/10/18 2145    Laboratory Results:    Results from last 7 days  Lab Units 02/11/18  0254 02/10/18  2208 02/10/18  1925 02/10/18  1837 02/10/18  1744 02/05/18  0503   WBC Thousand/uL 9 49  --   --   --  10 89*  --    HEMOGLOBIN g/dL 7 7*  --   --   --  8 7*  --    HEMATOCRIT % 23 4*  --   --   --  26 2*  --    PLATELETS Thousands/uL 189 185  --   --  261  --    SODIUM mmol/L 141 142  --  138  --  142   POTASSIUM mmol/L 5 5* 5 8* 7 1* 7 4*  --  4 8   CHLORIDE mmol/L 111* 109*  --  108  --  108   CO2 mmol/L 22 21  --  22  --  27   BUN mg/dL 38* 37*  --  38*  --  54*   CREATININE mg/dL 3 90* 3 86*  --  4 03*  --  3 39*   CALCIUM mg/dL 8 5 9 0  --  8 5  --  8 3   MAGNESIUM mg/dL 1 9 2 0  --   --   --   -- PHOSPHORUS mg/dL 4 4  --   --   --   --  4 0   TOTAL PROTEIN g/dL 5 4* 5 6*  --   --   --   --    GLUCOSE RANDOM mg/dL 153* 235*  --  233*  --  130

## 2018-02-11 NOTE — H&P
History and Physical - Critical Care   Jay Mckeon 27 y o  male MRN: [id number]  Unit/Bed#: ED 27 Encounter: [id number]    Reason for Admission / Chief Complaint: HTN     History of Present Illness:  iNssa Heaton is a 27 y o  male who presents for evaluation of hypertension has a history of high blood pressure history of 2 strokes CKD stage III, patient was thus discharged on Monday today today he is kept checking his blood pressure and it would increase, denies any illness, pain but admits to some nausea and headache  His home regimes is Nifedipine ER 60 mg in p m ,  carvedilol 25 mg b i d , hydralazine 100 mg t i d  and torsemide 20 mg in a m  he is compliant with his medication  The has a history of CVA + 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, elevated homocysteine levels and heterozygote PT gene mutation  Which he remains on aspirin Plavix and statin for, he is following up with Neurology on a continuous basis  he participates in PT and OT  He has outpatient nephrology follow-ups for acute renal failure on stage 3 chronic kidney disease  On this admission his creatinine went to 4 and his potassium levels increased to 7 4, he was treated in the ER with dextrose Kayexalate insulin 6 units  Will add normal saline a 1000 bolus with 80 of Lasix, also art albuterol neb, notified nephrology Dr Geovanny Orozco about the patient and agrees with said plan, critical care attending Dr Lelo Boothe was notified about the step-down 1 admit  Placed in ICU bed in case the need for dialysis occurs  History obtained from chart review and the patient  Past Medical History:  Past Medical History:   Diagnosis Date    Cerebellar stroke side undetermined 2015 2013    Diabetes type 1, controlled ([facility] [address] )     Hypertension        Past Surgical History:  History reviewed  No pertinent surgical history  Past Family History:  History reviewed  No pertinent family history      Social History:  History   Smoking Status    Current Every Day Smoker    Packs/day: 0 50    Types: Cigarettes   Smokeless Tobacco    Never Used     History   Alcohol Use    Yes     Comment: socially     History   Drug Use    Frequency: 3 0 times per week    Types: Marijuana     Comment: weekly     Marital Status: Single  Exercise History: n/a     Medications:  Current Facility-Administered Medications   Medication Dose Route Frequency    niCARdipine (CARDENE) 25 mg (STANDARD CONCENTRATION) in sodium chloride 0 9% 250 mL  5 mg/hr Intravenous Titrated     Home medications:  Prior to Admission medications    Medication Sig Start Date End Date Taking?  Authorizing Provider   aspirin 81 mg chewable tablet Chew 2 tablets (162 mg total) daily 2/6/18   Lindsay Christy DO   aspirin 81 MG tablet Take 1 tablet by mouth daily    Historical Provider, MD   atorvastatin (LIPITOR) 40 mg tablet Take 1 tablet (40 mg total) by mouth every evening 2/5/18   Lindsay Christy DO   atorvastatin (LIPITOR) 40 mg tablet Take 1 tablet by mouth daily 2/17/16   Historical Provider, MD QUEEN ULTRAFINE III SHORT PEN 31G X 8 MM MISC  2/5/18   Historical Provider, MD   carvedilol (COREG) 25 mg tablet Take 1 tablet (25 mg total) by mouth 2 (two) times a day with meals 2/5/18   Linsday Christy DO   carvedilol (COREG) 25 mg tablet Take 1 tablet by mouth 2 (two) times a day 1/3/16   Historical Provider, MD   clopidogrel (PLAVIX) 75 mg tablet Take 1 tablet (75 mg total) by mouth daily 2/6/18   Lindsay Christy DO   ergocalciferol (VITAMIN D2) 50,000 units Take 1 capsule (50,000 Units total) by mouth once a week for 10 doses 2/9/18 4/14/18  Lindsay Christy DO   escitalopram (LEXAPRO) 10 mg tablet Take 1 tablet (10 mg total) by mouth daily 2/5/18   Lindsay Christy DO   folic acid (FOLVITE) 1 mg tablet Take 1 tablet (1 mg total) by mouth daily 2/6/18   Lindsay Christy DO   HUMALOG KWIKPEN 100 UNIT/ML SOPN  2/5/18   Historical Provider, MD   hydrALAZINE (APRESOLINE) 100 MG tablet Take 1 tablet (100 mg total) by mouth 3 (three) times a day 2/5/18   Beck Monet DO   influenza inactivated quadrivalent vaccine (FLULAVAL) 0 5 ML BROCK Inject 0 5 mL into the shoulder, thigh, or buttocks prior to discharge (vancomycin) for up to 1 dose 2/5/18   Beck Monet DO   insulin glargine (LANTUS) 100 units/mL subcutaneous injection Inject 6 Units under the skin daily at bedtime U-100 pen 2/5/18   Beck Monet, DO   insulin glargine (LANTUS) 100 units/mL subcutaneous injection Inject 6 Units under the skin daily with breakfast 2/6/18   Beck Monet DO   insulin lispro (HumaLOG) 100 units/mL injection Inject 4 Units under the skin 3 (three) times a day with meals U-100 pen 2/5/18   Beck Monet DO   NIFEdipine ER (ADALAT CC) 30 MG 24 hr tablet Take 1 tablet (30 mg total) by mouth 2 (two) times a day 2/5/18   Beck Monet DO   NIFEdipine ER (ADALAT CC) 60 MG 24 hr tablet  2/5/18   Historical Provider, MD   torsemide (DEMADEX) 20 mg tablet Take 1 tablet (20 mg total) by mouth daily 2/6/18   Beck Monet DO     Allergies: Allergies   Allergen Reactions    Sulfa Antibiotics Rash       ROS:   Review of Systems   Constitutional: Negative for appetite change, diaphoresis, fatigue and fever  HENT: Negative for congestion, facial swelling, rhinorrhea, sneezing and tinnitus  Eyes: Negative for photophobia, pain and discharge  Respiratory: Negative for apnea, cough and shortness of breath  Cardiovascular: Negative for chest pain and leg swelling  Gastrointestinal: Positive for nausea  Negative for abdominal distention, abdominal pain, diarrhea and vomiting  Endocrine: Negative for cold intolerance, polydipsia and polyphagia  Genitourinary: Negative for enuresis, frequency and hematuria  Musculoskeletal: Negative for arthralgias, gait problem and myalgias  Skin: Negative for color change and rash  Allergic/Immunologic: Negative for environmental allergies and food allergies  Neurological: Positive for headaches   Negative for syncope, speech difficulty and light-headedness  Hematological: Negative for adenopathy  Does not bruise/bleed easily  Psychiatric/Behavioral: Negative for behavioral problems, decreased concentration and hallucinations  The patient is not hyperactive  Vitals:  Vitals:    02/10/18 1709 02/10/18 1727 02/10/18 1852 02/10/18 2100   BP: (!) 246/114 (!) 260/122 (!) 231/106 (!) 194/103   BP Location: Left arm Right arm Left arm Right arm   Pulse: 74  68 85   Resp:    Temp: 98 3 °F (36 8 °C)      TempSrc: Oral      SpO2: 99%  100% 97%     Temperature:   Temp (24hrs), Av 3 °F (36 8 °C), Min:98 3 °F (36 8 °C), Max:98 3 °F (36 8 °C)    Current Temperature: 98 3 °F (36 8 °C)    Weights: There is no height or weight on file to calculate BMI  Hemodynamic Monitoring:  N/A     Non-Invasive/Invasive Ventilation Settings:  Respiratory    Lab Data (Last 4 hours)    None         O2/Vent Data (Last 4 hours)    None              No results found for: PHART, FJJ1TDK, PO2ART, SCJ3BKJ, L8ISKTZV, BEART, SOURCE  SpO2: SpO2: 97 %, SpO2 Activity: SpO2 Activity: At Rest, SpO2 Device: O2 Device: None (Room air)    Physical Exam     General:  Appears stated age of 27 , in no acute distress, well nourished   Head: NC/ AT   HEENT:  PERRLA,   Neck: supple, no JVD, carotids 2 +, trachea midline   Chest: Clear to auscultation bilaterally, no rhonchi  No wheezing  No rales,   CV: RRR, no murmurs, S1/ S2 normal, no rubs  Abd: Soft and non tender, non distended, bowel sounds are present,  MSK: moves all extremties   Pulses: +2 femoral, no bruit, brachial, radial, DP/ PT +2    Neuro: Alert and oriented ×3,   Sensation intact         Labs:    Results from last 7 days  Lab Units 02/10/18  1744   WBC Thousand/uL 10 89*   HEMOGLOBIN g/dL 8 7*   HEMATOCRIT % 26 2*   PLATELETS Thousands/uL 261   NEUTROS PCT % 76*   MONOS PCT % 4        Results from last 7 days  Lab Units 02/10/18  1925 02/10/18  1837 18  0502 02/04/18  0602   SODIUM mmol/L  --  138 142 139   POTASSIUM mmol/L 7 1* 7 4* 4 8 5 2   CHLORIDE mmol/L  --  108 108 108   CO2 mmol/L  --  22 27 24   BUN mg/dL  --  38* 54* 57*   CREATININE mg/dL  --  4 03* 3 39* 3 25*   CALCIUM mg/dL  --  8 5 8 3 8 2*   GLUCOSE RANDOM mg/dL  --  233* 130 123           Results from last 7 days  Lab Units 02/05/18  0503   PHOSPHORUS mg/dL 4 0                0  Lab Value Date/Time   TROPONINI 0 02 02/10/2018 1744   TROPONINI 0 03 01/22/2018 2130   TROPONINI 0 05 (H) 01/22/2018 1748   TROPONINI 0 04 01/22/2018 1600   TROPONINI 0 04 01/22/2018 1015     ABG:        Imaging:   Cta Head And Neck With And Without Contrast    Result Date: 1/22/2018  Narrative: CTA NECK AND BRAIN WITH     CONTRAST INDICATION: Visual change related to VI nerve palsy COMPARISON:   MRA 11/5/2015 TECHNIQUE:  Routine CT imaging of the Brain without contrast   Post contrast imaging was performed after administration of iodinated contrast through the neck and brain  Post contrast axial 0 625 mm images timed to opacify the arterial system  3D rendering was performed on an independent workstation  MIP reconstructions performed  Coronal reconstructions were performed of the noncontrast portion of the brain  Radiation dose length product (DLP) for this visit:  604 mGy-cm   This examination, like all CT scans performed in the [facility], was performed utilizing techniques to minimize radiation dose exposure, including the use of iterative reconstruction and automated exposure control  IV Contrast:  85 mL of iohexol (OMNIPAQUE)     IMAGE QUALITY:   Diagnostic FINDINGS: CERVICAL VASCULATURE AORTIC ARCH AND GREAT VESSELS:  Normal aortic arch and great vessel origins  Normal visualized subclavian vessels  RIGHT VERTEBRAL ARTERY CERVICAL SEGMENT:  Normal origin  The vessel is normal in caliber throughout the neck  LEFT VERTEBRAL ARTERY CERVICAL SEGMENT:  Normal origin   The vessel is normal in caliber throughout the neck  RIGHT EXTRACRANIAL CAROTID SEGMENT:  Normal caliber common carotid artery  Normal bifurcation and cervical internal carotid artery  No stenosis or dissection  LEFT EXTRACRANIAL CAROTID SEGMENT:  Normal caliber common carotid artery  Normal bifurcation and cervical internal carotid artery  No stenosis or dissection  NASCET criteria was used to determine the degree of internal carotid artery diameter stenosis  INTRACRANIAL VASCULATURE INTERNAL CAROTID ARTERIES:  Normal enhancement of the intracranial portions of the internal carotid arteries  Normal ophthalmic artery origins  Normal ICA terminus  ANTERIOR CIRCULATION:  Symmetric A1 segments and anterior cerebral arteries with normal enhancement  Normal anterior communicating artery  MIDDLE CEREBRAL ARTERY CIRCULATION:  M1 segment and middle cerebral artery branches demonstrate normal enhancement bilaterally  DISTAL VERTEBRAL ARTERIES:  Normal distal vertebral arteries  Posterior inferior cerebellar artery origins are normal  Normal vertebral basilar junction  BASILAR ARTERY:  Basilar artery is normal in caliber  Normal superior cerebellar arteries  POSTERIOR CEREBRAL ARTERIES: Both posterior cerebral arteries arises from the basilar tip  Both arteries demonstrate normal flow-related enhancement  Normal posterior communicating arteries  DURAL VENOUS SINUSES:  Normal  NON VASCULAR ANATOMY BONY STRUCTURES:  No acute osseous abnormality  SOFT TISSUES OF THE NECK:  Normal         THORACIC INLET:  Unremarkable  Impression: Normal CTA of the head or neck  No large vessel flow restrictive disease  * I personally telephoned this result to [place] [id number] on 1/22/2018 1048 hours  Workstation performed: [id number]     Ct Head Wo Contrast    Result Date: 1/25/2018  Narrative: CT BRAIN - WITHOUT CONTRAST INDICATION:  Gaze palsy  COMPARISON:  None  TECHNIQUE:  CT examination of the brain was performed    In addition to axial images, coronal reformatted images were created and submitted for interpretation  Radiation dose length product (DLP) for this visit:  1323 75 mGy-cm   This examination, like all CT scans performed in the [facility], was performed utilizing techniques to minimize radiation dose exposure, including the use of iterative reconstruction and automated exposure control  IMAGE QUALITY:  Diagnostic  FINDINGS:  PARENCHYMA:  Periventricular white matter foci of decreased attenuation suspicious for sequelae of demyelinating disease in a young patient  Chronic ischemic change also concern  These findings are similar to prior studies  There is no parenchymal hemorrhage  VENTRICLES AND EXTRA-AXIAL SPACES:  Normal for patient's age  VISUALIZED ORBITS AND PARANASAL SINUSES:  Unremarkable  CALVARIUM AND EXTRACRANIAL SOFT TISSUES:   Normal      Impression: No acute intracranial abnormality  Periventricular white matter foci of hypoattenuation as on similar studies and consistent with chronic ischemic disease versus possible demyelinating disease  Workstation performed: [id number]     Mri Brain Wo Contrast    Result Date: 1/24/2018  Narrative: MRI BRAIN WITHOUT CONTRAST INDICATION:  CVA COMPARISON:   1/22/2018 TECHNIQUE:  Sagittal T1, axial T2, axial FLAIR, axial T1, axial [place] and axial diffusion imaging  IMAGE QUALITY:  Diagnostic  FINDINGS: BRAIN PARENCHYMA: Similar-appearing small focus of diffusion restriction along the posterior aspect of the velia near the facial colliculus (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) with T2/FLAIR signal abnormality suggesting subacute infarct  Unchanged T2/FLAIR signal abnormality identified in the bilateral frontoparietal and right temporal periventricular regions, along the corpus callosum, abnormal for a patient of this age   Some of these signal lesions appear perpendicular to the left lateral ventricle and therefore could indicate demyelinating disease  These findings could relate to demyelinating disease although other white matter etiologies are not excluded  There is increased T1 signal abnormality in the right anterior genu corpus callosum, right medial superior frontal lobe and right thalamus which could be from retained contrast  VENTRICLES:  The ventricles are normal in size and contour  SELLA AND PITUITARY GLAND:  Normal  ORBITS:  Normal  PARANASAL SINUSES:  Normal  VASCULATURE:  Evaluation of the major intracranial vasculature demonstrates appropriate flow voids  CALVARIUM AND SKULL BASE:  Normal  EXTRACRANIAL SOFT TISSUES:  Normal      Impression: 1  Similar-appearing small focus of subacute infarct along the posterior aspect of the velia near the facial colliculus (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)  2  Unchanged white matter regions of signal abnormality superiorly related to demyelinating disease or other white matter etiologies  3  Increased signal abnormality in the right anterior genu corpus callosum and right thalamus which could be from retained contrast from recent MRI  4  Diffusely increased subarachnoid FLAIR signal likely due to any recent lumbar puncture, oxygenation or metabolic etiology  Please follow-up with unenhanced CT brain  Workstation performed: [id number]     Mri Brain W Wo Contrast    Addendum Date: 1/23/2018 Addendum:   Tiny focus of diffusion restriction along the posterior aspect of the velia near the facial colliculus (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) with T2/FLAIR signal abnormality suggesting subacute infarct  I personally discussed this study with Dr Reji Valle on 1/23/2018 3:41 PM      Result Date: 1/23/2018  Narrative: MRI BRAIN WITH AND WITHOUT CONTRAST INDICATION:  left lateral gaze diplopia  History taken directly from the electronic ordering system  COMPARISON:  None   TECHNIQUE: Sagittal T1, axial T2, axial FLAIR, axial T1, axial [place], axial diffusion  Sagittal, axial and coronal T1 postcontrast   Axial BRAVO post contrast   IV Contrast:  9 mL of gadobutrol injection (MULTI-DOSE)  IMAGE QUALITY:   Diagnostic  BRAIN PARENCHYMA:  There is no discrete mass, mass effect or midline shift  T2/FLAIR signal abnormality identified in the bilateral frontoparietal and right temporal periventricular regions, along the corpus callosum, abnormal for a patient of this age  Some of these signal lesions appear perpendicular to the left lateral ventricle and therefore could indicate demyelinating disease  These findings could relate to demyelinating disease although other white matter etiologies are not excluded  There is enhancement along the T2/FLAIR signal abnormality in the right anterior genu lesion which could indicate active demyelinating disease  Neoplasm is much less likely but not completely excluded  Tiny area of 4 mm enhancement seen along the right medial frontal lobe could also relate to the same active demyelination  There is no intracranial hemorrhage  There is no evidence of acute infarction and diffusion imaging is unremarkable  VENTRICLES:  The ventricles are normal in size and contour  SELLA AND PITUITARY GLAND:  Normal  ORBITS:  Normal  PARANASAL SINUSES:  Normal  VASCULATURE:  Evaluation of the major intracranial vasculature demonstrates appropriate flow voids  CALVARIUM AND SKULL BASE:  Normal  EXTRACRANIAL SOFT TISSUES:  Normal      Impression: 1  No evidence of diffusion restriction  2  Frontotemporoparietal signal abnormalities as described above possibly related to demyelinating disease although other white matter etiologies are not excluded  3  Enhancement of the anterior portion of the right genu corpus callosum could relate to an active demyelinating lesion, much less likely neoplasm   Workstation performed: [id number]     Mri Cervical Spine Wo Contrast    Result Date: 1/25/2018  Narrative: MRI CERVICAL SPINE WITHOUT CONTRAST INDICATION:  Concern for multiple sclerosis  Renal failure  COMPARISON:  None  TECHNIQUE:  Sagittal T1, sagittal T2, sagittal inversion recovery, axial T2, axial  2D merge     IMAGE QUALITY:  Diagnostic FINDINGS: ALIGNMENT:  The alignment is normal without significant listhesis  MARROW SIGNAL:  Marrow signal is within normal limits without signs of an infiltrative process  No signs of acute fracture or significant marrow edema pattern  CERVICAL AND VISUALIZED THORACIC CORD:  Within the limitations of motion, the cervical cord does not demonstrate any intramedullary lesions  There is no evidence to confirm demyelinating disease within the cervical cord  PREVERTEBRAL AND PARASPINAL SOFT TISSUES:   Normal         VISUALIZED POSTERIOR FOSSA:  Again noted is the evolving infarct in the facial colliculus  CERVICAL DISC SPACES:     C2-C3:  No significant spinal canal stenosis  No right and no left neural foraminal stenosis  C3-C4:  No significant spinal canal stenosis  No right and no left neural foraminal stenosis  C4-C5:  Disc osteophyte complex with uncovertebral hypertrophy  Indentation on the ventral thecal sac without cord deformity  No right and no left neural foraminal stenosis  C5-C6:  Disc osteophyte complex with uncovertebral hypertrophy  Flattening of the cord without compression  Mild right and mild left neural foraminal stenosis  C6-C7:  No significant spinal canal stenosis  No right and no left neural foraminal stenosis  C7-T1:  No significant spinal canal stenosis  No right and no left neural foraminal stenosis  UPPER THORACIC DISC SPACES:  Normal      Impression: No clear evidence for demyelinating disease in the cervical cord  Workstation performed: [id number]     Mri Thoracic Spine Wo Contrast    Result Date: 1/25/2018  Narrative: MRI THORACIC SPINE WITHOUT CONTRAST INDICATION:  Multiple sclerosis COMPARISON:  Concurrent MRI of the cervical spine   TECHNIQUE:  Sagittal T1, sagittal T2, sagittal inversion recovery, axial T2,  axial 2D MERGE  IMAGE QUALITY: Motion degraded  FINDINGS: ALIGNMENT: Normal alignment of the thoracic spine  No compression fracture  No subluxation  No scoliosis  MARROW SIGNAL:  Normal marrow signal is identified within the visualized bony structures  No discrete marrow lesion  THORACIC CORD: Given the degree of motion, discrete lesions are difficult to identify  However the, the cord does appear slightly heterogeneous  Consider repeat examination when indicated  No lesion is confirmed on more than 1 sequence PARAVERTEBRAL SOFT TISSUES:  Normal         THORACIC DEGENERATIVE CHANGE: No disc herniation, canal stenosis or foraminal narrowing  No degenerative changes  Impression: Given the degree of motion, difficult to identify clear cord lesions to confirm demyelinating disease  Workstation performed: [id number]     Ir Central Line Placement    Result Date: 1/29/2018  Narrative: Procedure: Placement of right-sided non tunneled pheresis catheter  History: 68-year-old male patient with 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, 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  A large bore central venous catheter is needed for plasmapheresis  Comments: The patient was identified  The risks, benefits and alternatives were explained to the patient who expressed understanding and signed an consent  The patient was placed in the supine position  Real-time ultrasound examination of the right side of the neck showed patent, compressible internal jugular vein suitable for access and placement of a non tunneled pheresis catheter  A permanent ultrasound image was recorded  Maximum sterile barrier technique including cap, mask, gown and gloves, as well as a large sterile sheath was used  Appropriate hand hygiene was performed  The right side of the neck and chest was prepped using 2% chlorhexidine for cutaneous antisepsis and draped in the usual sterile fashion   The ultrasound probe was introduced to the field in a sterile sleeve  Sterile ultrasound gel was used  Timeout verification, with all participants present, was performed prior to any intervention 1% lidocaine (6 mL) was used for local anesthesia  Under real-time ultrasound guidance, the right internal jugular vein was accessed using a 21 gauge micropuncture needle  Using standard needle, guidewire, catheter exchange techniques and multi sequential dilatation of the subcutaneous tract, a 14 [place], 20 cm, SLX Hemo-Cath, non tunneled pheresis catheter was placed over a guidewire  Under fluoroscopy guidance, the tip was positioned in the right atrium  Both ports had good forward flush and return  Both ports were instilled with heparin  The catheter was sutured at the skin exit site and a sterile dressing was applied  The patient tolerated the procedure well  Fluoroscopy time: 0 3 minutes  Image count:  2  Contrast: None  Estimated blood loss: None  VTe prophylaxis: Short duration procedure not requiring VTe prophylaxis  Complications: None  Medications: IV moderate sedation was not required  The patient's cardiorespiratory status was monitored before, during and after the procedure by interventional radiology nursing staff, under my direct supervision  Impression: Impression: Uncomplicated placement of right internal jugular vein approach, 14 [place], 20 cm, SLX Hemo-Cath, non tunneled pheresis catheter  The pheresis catheter may be used immediately  Workstation performed: [id number]     Xr Stroke Alert Portable Chest    Result Date: 1/22/2018  Narrative: CHEST INDICATION:  Stroke alert COMPARISON:  None VIEWS:   AP frontal IMAGES:  1 FINDINGS:     Cardiomediastinal silhouette appears unremarkable  The lungs are clear  No pneumothorax or pleural effusion  Visualized osseous structures appear within normal limits for the patient's age  Impression: No active pulmonary disease   Workstation performed: [id number]     Ct Stroke Alert Brain    Result Date: 1/22/2018  Narrative: CT BRAIN - STROKE ALERT PROTOCOL INDICATION:  Visual changes, history of CVA, left eye turns inward COMPARISON:  CT for 20/2/2017 TECHNIQUE:  CT examination of the brain was performed  In addition to axial images, coronal reformatted images were created and submitted for interpretation  Radiation dose length product (DLP) for this visit:  1323 mGy-cm   This examination, like all CT scans performed in the [facility], was performed utilizing techniques to minimize radiation dose exposure, including the use of iterative reconstruction and automated exposure control  IMAGE QUALITY:  Diagnostic  FINDINGS:  PARENCHYMA:  No intracranial hemorrhage, mass, mass effect or edema  Numerous, poorly marginated areas of low density are identified in the white matter of both hemispheres  These have progressed since prior study and likely reflect sequela of chronic small vessel disease  Acute changes would be difficult to assess without MR  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 a common finding in patients with diabetes and hypertension  No gross brainstem pathology  VENTRICLES AND EXTRA-AXIAL SPACES:  Normal for patient's age  VISUALIZED ORBITS AND PARANASAL SINUSES:  Trace mucosal thickening throughout the paranasal sinuses  CALVARIUM AND EXTRACRANIAL SOFT TISSUES:  Soft tissue density material in both external auditory canals, nonspecific  Periapical lucencies in the maxillary premolars bilaterally  Impression: No intracranial hemorrhage or mass  Progression of what most probably represents precocious chronic small vessel disease since prior study 9 months earlier  Based upon clinical presentation patient may have brainstem infarct which is not evident on this exam but could be documented with MR   Findings were directly discussed with Adilene Burnett on 1/22/2018 10:48 AM  Workstation performed: [id number]      Kidney And Bladder    Result Date: 1/24/2018  Narrative: RENAL ULTRASOUND INDICATION: Acute renal failure COMPARISON: Renal ultrasound October 28, 2015 TECHNIQUE:   Ultrasound of the retroperitoneum was performed with a curvilinear transducer utilizing volumetric sweeps and still imaging techniques  FINDINGS: KIDNEYS: Symmetric and normal size  Right kidney:  12 6 x 5 9 cm  Normal echogenicity and contour  No suspicious masses detected  No hydronephrosis  No shadowing calculi  No perinephric fluid collections  Left kidney:  12 4 x 7 3 cm  Normal echogenicity and contour  No suspicious masses detected  No hydronephrosis  No shadowing calculi  No perinephric fluid collections  URETERS: Nonvisualized  BLADDER: Incompletely distended  No focal thickening or mass lesions  Bilateral ureteral jets detected  Impression: Normal sonographic appearance of the kidneys  Workstation performed: [id number]     Ir Lumbar Puncture    Result Date: 1/26/2018  Narrative: LUMBAR PUNCTURE UNDER FLUOROSCOPY    History: Unable to obtain spinal fluid on floor Fluoroscopy time: 0 9 minutes Images: Anesthesia: 1% local lidocaine infiltration only  Procedure: The patient was identified verbally, and by wrist band   " Time out" procedure was performed  Informed consent was obtained  Following obtaining informed consent, the lower back was prepped and draped in usual sterile fashion  All elements of maximal sterile barrier technique were followed (cap, mask, sterile gown, sterile gloves, large sterile sheet, hand hygiene, and 2% chlorhexidine for cutaneous antisepsis)  Lidocaine was given as local anesthesia  Using fluoroscopic guidance, A 22 gauge needle was placed over the left L2-3 interspace  Clear CSF was returned  This was sampled in multiple tubes, and sent for laboratory analysis as requested  Findings: CSF appear grossly clear  Impression: Impression: Successful lumbar puncture under fluoroscopic guidance   Workstation performed: [id number]      I have personally reviewed pertinent reports  EKG: This was personally reviewed by myself  Micro:  No results found for: Taj Armstrong, Dontae Sanon    ______________________________________________________________________    Assessment:     Principal Problem:    Hypertensive urgency  Active Problems:    Acute renal failure superimposed on stage 3 chronic kidney disease (HCC)    Type 1 diabetes mellitus with diabetic nephropathy, with long-term current use of insulin (HCC)    History of lacunar cerebrovascular accident (CVA)    Hyperhomocysteinemia (HCC)    Accelerated hypertension    Acute hyperkalemia    Anemia in stage 3 chronic kidney disease    Leukocytosis (leucocytosis)  Resolved Problems:    Nausea      Plan:     Neuro:    pain control with PO pain medication   CAM-ICU score daily   Delirium precautions   Patient was thus discharged 2/5/2018  During his hospital stay he had 3 3 treatments with plasmapheresis, also he had high-dose steroids  With no significant improvement  In July of 2015 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 and also heterozygote for prothrombin gene mutation  He seeing heme on Hematology for  Neurology was seeing him on last admission and he has follow-ups as outpatient with Neurology for the past admission  CV:   Control BP with home medications and start Cardene drip  He had an ultrasound of the kidneys last admission which was normal  HR controlled with   continue ASA/ PLavix     Lung:   Incentive spirometer  Wean FIO2 for PO2 > 92 %   Respiratory protocol  HOB > 30       GI:   PPIs - continue home treatment  Diet on diabetic diet carb controlled, renal diet    FEN: Replete electrolytes as needed, DVT prophylaxis    :    Monitor ins and outs  Creatinine increased to 4  Potassium was 7 4  Treating acute hyperkalemia, volume resuscitation, with Lasix, D50 insulin and Kayexalate  Repeat checkup in 4 hours  Nephrology is following for possible dialysis as needed  Creatinine baseline is 2 4 now at 4  Follows Dr Carmen Pinon  Outpatient nephrology   AD complete for kidney pending     ID:   Monitor white count and temperature  No acute issues  Heme:   monitor hemoglobin and white count baseline  anemia due to chronic rate kidney disease  Heme-Onc is following outpatient     Endo: Insulin sliding scale, with meal homelog and Lantus is p m  and a m  MSK:  Moves all extremities  Disposition:  Step-down level 1      Counseling / Coordination of Care  Total Critical Care time spent 30 minutes excluding procedures, teaching and family updates  ______________________________________________________________________    VTE Pharmacologic Prophylaxis: Sequential compression device (Venodyne)   VTE Mechanical Prophylaxis: sequential compression device    Invasive lines and devices: Invasive Devices     Peripheral Intravenous Line            Peripheral IV 02/10/18 Left Antecubital less than 1 day    Peripheral IV 02/10/18 Right Antecubital less than 1 day                Code Status: Prior  POA:    POLST:      Given critical illness, patient length of stay will require greater than two midnights  Portions of the record may have been created with voice recognition software  Occasional wrong word or "sound a like" substitutions may have occurred due to the inherent limitations of voice recognition software  Read the chart carefully and recognize, using context, where substitutions have occurred        Sindi Vergara PA-C

## 2018-02-11 NOTE — CASE MANAGEMENT
Thank you,  [facility] in the [facility] by Rickie Carcamo for 2017  Network Utilization Review Department  Phone: [phone]; Fax [phone]  ATTENTION: The Network Utilization Review Department is now centralized for our 7 Facilities  Make a note that we have a new phone and fax numbers for our Department  Please call with any questions or concerns to [phone] and carefully follow the prompts so that you are directed to the right person  All voicemails are confidential  Fax any determinations, approvals, denials, and requests for initial or continue stay review clinical to [phone]  Due to HIGH CALL volume, it would be easier if you could please send faxed requests to expedite your requests and in part, help us provide discharge notifications faster     ===============================================================    Initial Clinical Review    Admission: Date/Time/Statement: 2/10/18 @ 1845   Orders Placed This Encounter   Procedures    Inpatient Admission (expected length of stay for this patient is greater than two midnights)     Standing Status:   Standing     Number of Occurrences:   1     Order Specific Question:   Admitting Physician     Answer:   Bob Ross     Order Specific Question:   Level of Care     Answer:   Level 2 Stepdown / HOT [14]     Order Specific Question:   Estimated length of stay     Answer:   More than 2 Midnights     Order Specific Question:   Certification     Answer:   I certify that inpatient services are medically necessary for this patient for a duration of greater than two midnights  See H&P and MD Progress Notes for additional information about the patient's course of treatment           ED: Date/Time/Mode of Arrival:   ED Arrival Information     Expected Arrival Acuity Means of Arrival Escorted By Service Admission Type    - 2/10/2018 15:39 Emergent Walk-In Family Member Critical Care/ICU Emergency Arrival Complaint    high blood pressure          Chief Complaint:   Chief Complaint   Patient presents with    Hypertension     Pt presents to the ED with HTN episode onset this morning  Recently discharged Monday for stroke  History of Illness:   Edwin Sands is a 27 y o  male who presents for evaluation of hypertension has a history of high blood pressure history of 2 strokes CKD stage III, patient was thus discharged on Monday today today he is kept checking his blood pressure and it would increase, denies any illness, pain but admits to some nausea and headache  His home regimes is Nifedipine ER 60 mg in p m ,  carvedilol 25 mg b i d , hydralazine 100 mg t i d  and torsemide 20 mg in a m  he is compliant with his medication  The has a history of CVA + 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, elevated homocysteine levels and heterozygote PT gene mutation  Which he remains on aspirin Plavix and statin for, he is following up with Neurology on a continuous basis  he participates in PT and OT  He has outpatient nephrology follow-ups for acute renal failure on stage 3 chronic kidney disease   On this admission his creatinine went to 4 and his potassium levels increased to 7 4, he was treated in the ER with dextrose Kayexalate insulin 6 units    ED Vital Signs:   ED Triage Vitals   Temperature Pulse Respirations Blood Pressure SpO2   02/10/18 1709 02/10/18 1709 02/10/18 1709 02/10/18 1709 02/10/18 1709   98 3 °F (36 8 °C) 74 18 (!) 246/114 99 %      Temp Source Heart Rate Source Patient Position - Orthostatic VS BP Location FiO2 (%)   02/10/18 1709 02/10/18 1709 02/10/18 1709 02/10/18 1709 --   Oral Monitor Sitting Left arm       Pain Score       02/10/18 1852       No Pain        Wt Readings from Last 1 Encounters:   02/11/18 103 kg (227 lb 1 2 oz)     Abnormal Labs/Diagnostic Test Results:     WBC Thousand/uL 10 89*   HEMOGLOBIN g/dL 8 7*   HEMATOCRIT % 26 2*   PLATELETS Thousands/uL 261     Lab Units 02/10/18  1837   SODIUM mmol/L 138   POTASSIUM mmol/L 7 4*   CHLORIDE mmol/L 108   CO2 mmol/L 22   BUN mg/dL 38*   CREATININE mg/dL 4 03*   CALCIUM mg/dL 8 5   GLUCOSE RANDOM mg/dL 233*     TROPONINI 0 02 02/10/2018 1744     Normal CTA of the head or neck  No large vessel flow restrictive disease  CT head: No acute intracranial abnormality  Periventricular white matter foci of hypoattenuation as on similar studies and consistent with chronic ischemic disease versus possible demyelinating disease  MRI brain:  1  Similar-appearing small focus of subacute infarct along the posterior aspect of the velia near the facial colliculus (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)  2  Unchanged white matter regions of signal abnormality superiorly related to demyelinating disease or other white matter etiologies  3  Increased signal abnormality in the right anterior genu corpus callosum and right thalamus which could be from retained contrast from recent MRI  4  Diffusely increased subarachnoid FLAIR signal likely due to any recent lumbar puncture, oxygenation or metabolic etiology      ED Treatment:   Medication Administration from 02/10/2018 1539 to 02/10/2018 2141       Date/Time Order Dose Route Action Action by Comments     02/10/2018 1758 labetalol (NORMODYNE) injection 20 mg 20 mg Intravenous Given Shanta De Luna RN      02/10/2018 1837 labetalol (NORMODYNE) injection 20 mg 20 mg Intravenous Given Shanta De Luna RN      02/10/2018 2059 niCARdipine (CARDENE) 25 mg (STANDARD CONCENTRATION) in sodium chloride 0 9% 250 mL 7 5 mg/hr Intravenous Rate/Dose Change Shelli Goldstein RN      02/10/2018 1911 niCARdipine (CARDENE) 25 mg (STANDARD CONCENTRATION) in sodium chloride 0 9% 250 mL 5 mg/hr Intravenous [address]      02/10/2018 1925 dextrose 50 % IV solution 25 mL 25 mL Intravenous Given Shelli Goldstein RN      02/10/2018 1929 insulin regular (HumuLIN R,NovoLIN R) injection 6 Units 6 Units Intravenous Given Shelli Goldstein RN      02/10/2018 1920 sodium polystyrene sulfonate (KAYEXALATE) oral suspension 30 g 30 g Oral Given Shelli Goldstein RN      02/10/2018 1934 calcium gluconate 1 g in sodium chloride 0 9 % 100 mL IVPB 1 g Intravenous [address] [facility]      02/10/2018 1923 sodium chloride 0 9 % bolus 1,000 mL 1,000 mL Intravenous [address] [facility]      02/10/2018 2056 furosemide (LASIX) injection 80 mg 80 mg Intravenous Given Shelli Goldstein RN /103          Past Medical/Surgical History:    Active Ambulatory Problems     Diagnosis Date Noted    Cerebrovascular accident (CVA) of left pontine structure ([facility] [address] ) 07/21/2015    Acute renal failure superimposed on stage 3 chronic kidney disease ([facility] [address] ) 12/02/2015    Hypertensive urgency 07/21/2015    Type 1 diabetes mellitus with diabetic nephropathy, with long-term current use of insulin ([facility] [address] ) 06/19/2017    History of lacunar cerebrovascular accident (CVA) 01/22/2018    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,   01/28/2018    Binocular visual disturbance 01/28/2018    Vitamin D deficiency 01/29/2018    Hyperhomocysteinemia ([facility] [address] ) 02/05/2018    Accelerated hypertension 02/09/2018     Resolved Ambulatory Problems     Diagnosis Date Noted    Nausea 01/26/2018    Hyperphosphatemia 01/29/2018    Azotemia 01/29/2018     Past Medical History:   Diagnosis Date    Cerebellar stroke side undetermined 2015 2013    Diabetes type 1, controlled ([facility] [address] )     Hypertension        Admitting Diagnosis: Acute hyperkalemia [E87 5]  Hypertension [I10]  Acute renal failure superimposed on stage 3 chronic kidney disease, unspecified acute renal failure type ([facility] [address] ) [N17 9, N18 3]    Age/Sex: 27 y o  male    Assessment/Plan:   Hypertensive urgency  Active Problems:    Acute renal failure superimposed on stage 3 chronic kidney disease (HCC)    Type 1 diabetes mellitus with diabetic nephropathy, with long-term current use of insulin ([facility] [address] )    History of lacunar cerebrovascular accident (CVA)    Hyperhomocysteinemia (HCC)    Accelerated hypertension    Acute hyperkalemia    Anemia in stage 3 chronic kidney disease    Leukocytosis (leucocytosis)  Resolved Problems:    Nausea        Plan:      Neuro:    pain control with PO pain medication   CAM-ICU score daily   Delirium precautions   Patient was thus discharged 2/5/2018  During his hospital stay he had 3 3 treatments with plasmapheresis, also he had high-dose steroids  With no significant improvement  In July of 2015 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 and also heterozygote for prothrombin gene mutation  He seeing heme on Hematology for  Neurology was seeing him on last admission and he has follow-ups as outpatient with Neurology for the past admission          CV:   Control BP with home medications and start Cardene drip  He had an ultrasound of the kidneys last admission which was normal  HR controlled with   continue ASA/ PLavix      Lung:   Incentive spirometer  Wean FIO2 for PO2 > 92 %   Respiratory protocol  HOB > 30         GI:   PPIs - continue home treatment  Diet on diabetic diet carb controlled, renal diet     FEN: Replete electrolytes as needed, DVT prophylaxis     : Monitor ins and outs  Creatinine increased to 4  Potassium was 7 4  Treating acute hyperkalemia, volume resuscitation, with Lasix, D50 insulin and Kayexalate  Repeat checkup in 4 hours  Nephrology is following for possible dialysis as needed  Creatinine baseline is 2 4 now at [address]  Dr Joseph Rodriguez  Outpatient nephrology   AD complete for kidney pending      ID: Monitor white count and temperature  No acute issues  Heme:   monitor hemoglobin and white count baseline  anemia due to chronic rate kidney disease  Heme-Onc is following outpatient      Endo:    Insulin sliding scale, with meal homelog and Lantus is p m  and a m      MSK:  Moves all extremities  Disposition:  Step-down level 1      VTE Pharmacologic Prophylaxis: Sequential compression device (Venodyne)   VTE Mechanical Prophylaxis: sequential compression device     Admission Orders:  Scheduled Meds:   Current Facility-Administered Medications:  aspirin 162 mg Oral Daily Sindi Vergara PA-C    atorvastatin 40 mg Oral QPM Sindi Vergara PA-C    carvedilol 25 mg Oral BID With Meals Sindi Vergara PA-C    clopidogrel 75 mg Oral Daily Sindi Vergara PA-C    [START ON 2/16/2018] ergocalciferol 50,000 Units Oral Weekly Erika VelizSAV palma    escitalopram 10 mg Oral Daily Sindi Vergara PA-C    folic acid 1 mg Oral Daily Sindi Vergara PA-C    heparin (porcine) 5,000 Units Subcutaneous Q8H [address] Sindi Vergara PA-C    hydrALAZINE 20 mg Intravenous Q8H PRN Wilma Pennington PA-C    hydrALAZINE 100 mg Oral [facility] Martinez Neff MD    insulin glargine 6 Units Subcutaneous Daily With Breakfast Sindi Vergara PA-C    insulin glargine 6 Units Subcutaneous HS Sindi Vergara PA-C    insulin lispro 1-5 Units Subcutaneous HS Sindi Vergara PA-C    insulin lispro 1-6 Units Subcutaneous TID AC Sindi Vergara PA-C    insulin lispro 4 Units Subcutaneous Daily With Breakfast Sindi Vergara PA-C    insulin lispro 4 Units Subcutaneous Daily With Lunch Sindi Vergara PA-C    insulin lispro 4 Units Subcutaneous Daily With Dinner Sindi Vergara PA-C    niCARdipine 5 mg/hr Intravenous Titrated Kym Stuart MD Last Rate: 2 5 mg/hr (02/11/18 0853)   NIFEdipine ER 60 mg Oral BID (diuretic) Martinez Neff MD    sodium polystyrene sulfonate 15 g [facility]      Continuous Infusions:   niCARdipine 5 mg/hr Last Rate: 2 5 mg/hr (02/11/18 0853)     PRN Meds: hydrALAZINE

## 2018-02-12 ENCOUNTER — APPOINTMENT (OUTPATIENT)
Dept: OCCUPATIONAL THERAPY | Facility: CLINIC | Age: 33
End: 2018-02-12
Payer: COMMERCIAL

## 2018-02-12 ENCOUNTER — APPOINTMENT (OUTPATIENT)
Dept: PHYSICAL THERAPY | Facility: CLINIC | Age: 33
End: 2018-02-12
Payer: COMMERCIAL

## 2018-02-12 PROBLEM — R80.1 PERSISTENT PROTEINURIA: Chronic | Status: ACTIVE | Noted: 2018-02-11

## 2018-02-12 PROBLEM — D63.1 ANEMIA IN STAGE 3 CHRONIC KIDNEY DISEASE (HCC): Chronic | Status: ACTIVE | Noted: 2018-02-10

## 2018-02-12 PROBLEM — N18.30 ANEMIA IN STAGE 3 CHRONIC KIDNEY DISEASE (HCC): Chronic | Status: ACTIVE | Noted: 2018-02-10

## 2018-02-12 PROBLEM — E10.21 TYPE 1 DIABETES MELLITUS WITH DIABETIC NEPHROPATHY, WITH LONG-TERM CURRENT USE OF INSULIN (HCC): Chronic | Status: ACTIVE | Noted: 2017-06-19

## 2018-02-12 PROBLEM — N17.9 ACUTE KIDNEY INJURY (HCC): Status: RESOLVED | Noted: 2018-02-11 | Resolved: 2018-02-12

## 2018-02-12 PROBLEM — E87.5 ACUTE HYPERKALEMIA: Status: RESOLVED | Noted: 2018-02-10 | Resolved: 2018-02-12

## 2018-02-12 PROBLEM — E55.9 VITAMIN D DEFICIENCY: Chronic | Status: ACTIVE | Noted: 2018-01-29

## 2018-02-12 PROBLEM — D72.829 LEUKOCYTOSIS (LEUCOCYTOSIS): Status: RESOLVED | Noted: 2018-02-10 | Resolved: 2018-02-12

## 2018-02-12 PROBLEM — I10 ACCELERATED HYPERTENSION: Status: RESOLVED | Noted: 2018-02-09 | Resolved: 2018-02-12

## 2018-02-12 PROBLEM — H51.0: Chronic | Status: ACTIVE | Noted: 2018-01-28

## 2018-02-12 PROBLEM — N18.4 CHRONIC KIDNEY DISEASE, STAGE 4 (SEVERE) (HCC): Chronic | Status: ACTIVE | Noted: 2018-02-11

## 2018-02-12 PROBLEM — E72.11 HYPERHOMOCYSTEINEMIA (HCC): Chronic | Status: ACTIVE | Noted: 2018-02-05

## 2018-02-12 PROBLEM — H53.30 BINOCULAR VISUAL DISTURBANCE: Chronic | Status: ACTIVE | Noted: 2018-01-28

## 2018-02-12 LAB
ANION GAP SERPL CALCULATED.3IONS-SCNC: 7 MMOL/L (ref 4–13)
ANION GAP SERPL CALCULATED.3IONS-SCNC: 9 MMOL/L (ref 4–13)
ATRIAL RATE: 70 BPM
BASOPHILS # BLD AUTO: 0.05 THOUSANDS/ΜL (ref 0–0.1)
BASOPHILS NFR BLD AUTO: 1 % (ref 0–1)
BUN SERPL-MCNC: 35 MG/DL (ref 5–25)
BUN SERPL-MCNC: 36 MG/DL (ref 5–25)
CALCIUM SERPL-MCNC: 8.3 MG/DL (ref 8.3–10.1)
CALCIUM SERPL-MCNC: 8.4 MG/DL (ref 8.3–10.1)
CHLORIDE SERPL-SCNC: 111 MMOL/L (ref 100–108)
CHLORIDE SERPL-SCNC: 111 MMOL/L (ref 100–108)
CO2 SERPL-SCNC: 22 MMOL/L (ref 21–32)
CO2 SERPL-SCNC: 24 MMOL/L (ref 21–32)
CREAT SERPL-MCNC: 3.68 MG/DL (ref 0.6–1.3)
CREAT SERPL-MCNC: 3.84 MG/DL (ref 0.6–1.3)
EOSINOPHIL # BLD AUTO: 0.32 THOUSAND/ΜL (ref 0–0.61)
EOSINOPHIL NFR BLD AUTO: 4 % (ref 0–6)
ERYTHROCYTE [DISTWIDTH] IN BLOOD BY AUTOMATED COUNT: 13.4 % (ref 11.6–15.1)
GFR SERPL CREATININE-BSD FRML MDRD: 19 ML/MIN/1.73SQ M
GFR SERPL CREATININE-BSD FRML MDRD: 20 ML/MIN/1.73SQ M
GLUCOSE SERPL-MCNC: 107 MG/DL (ref 65–140)
GLUCOSE SERPL-MCNC: 153 MG/DL (ref 65–140)
GLUCOSE SERPL-MCNC: 186 MG/DL (ref 65–140)
GLUCOSE SERPL-MCNC: 231 MG/DL (ref 65–140)
GLUCOSE SERPL-MCNC: 248 MG/DL (ref 65–140)
GLUCOSE SERPL-MCNC: 91 MG/DL (ref 65–140)
HCT VFR BLD AUTO: 23.7 % (ref 36.5–49.3)
HGB BLD-MCNC: 7.9 G/DL (ref 12–17)
INR PPP: 0.9 (ref 0.86–1.16)
LYMPHOCYTES # BLD AUTO: 1.79 THOUSANDS/ΜL (ref 0.6–4.47)
LYMPHOCYTES NFR BLD AUTO: 19 % (ref 14–44)
MAGNESIUM SERPL-MCNC: 1.8 MG/DL (ref 1.6–2.6)
MCH RBC QN AUTO: 31.3 PG (ref 26.8–34.3)
MCHC RBC AUTO-ENTMCNC: 33.3 G/DL (ref 31.4–37.4)
MCV RBC AUTO: 94 FL (ref 82–98)
MONOCYTES # BLD AUTO: 0.51 THOUSAND/ΜL (ref 0.17–1.22)
MONOCYTES NFR BLD AUTO: 6 % (ref 4–12)
NEUTROPHILS # BLD AUTO: 6.56 THOUSANDS/ΜL (ref 1.85–7.62)
NEUTS SEG NFR BLD AUTO: 70 % (ref 43–75)
P AXIS: 45 DEGREES
PHOSPHATE SERPL-MCNC: 4.8 MG/DL (ref 2.7–4.5)
PLATELET # BLD AUTO: 199 THOUSANDS/UL (ref 149–390)
PMV BLD AUTO: 10.1 FL (ref 8.9–12.7)
POTASSIUM SERPL-SCNC: 5.2 MMOL/L (ref 3.5–5.3)
POTASSIUM SERPL-SCNC: 5.3 MMOL/L (ref 3.5–5.3)
PR INTERVAL: 162 MS
PROTHROMBIN TIME: 12.4 SECONDS (ref 12.1–14.4)
QRS AXIS: 35 DEGREES
QRSD INTERVAL: 70 MS
QT INTERVAL: 390 MS
QTC INTERVAL: 421 MS
RBC # BLD AUTO: 2.52 MILLION/UL (ref 3.88–5.62)
SODIUM SERPL-SCNC: 142 MMOL/L (ref 136–145)
SODIUM SERPL-SCNC: 142 MMOL/L (ref 136–145)
T WAVE AXIS: 92 DEGREES
VENTRICULAR RATE: 70 BPM
WBC # BLD AUTO: 9.23 THOUSAND/UL (ref 4.31–10.16)

## 2018-02-12 PROCEDURE — 93976 VASCULAR STUDY: CPT | Performed by: SURGERY

## 2018-02-12 PROCEDURE — 99233 SBSQ HOSP IP/OBS HIGH 50: CPT | Performed by: INTERNAL MEDICINE

## 2018-02-12 PROCEDURE — 82948 REAGENT STRIP/BLOOD GLUCOSE: CPT

## 2018-02-12 PROCEDURE — 80048 BASIC METABOLIC PNL TOTAL CA: CPT | Performed by: PHYSICIAN ASSISTANT

## 2018-02-12 PROCEDURE — 85610 PROTHROMBIN TIME: CPT | Performed by: PHYSICIAN ASSISTANT

## 2018-02-12 PROCEDURE — 82088 ASSAY OF ALDOSTERONE: CPT | Performed by: INTERNAL MEDICINE

## 2018-02-12 PROCEDURE — 93010 ELECTROCARDIOGRAM REPORT: CPT | Performed by: INTERNAL MEDICINE

## 2018-02-12 PROCEDURE — 85025 COMPLETE CBC W/AUTO DIFF WBC: CPT | Performed by: INTERNAL MEDICINE

## 2018-02-12 PROCEDURE — 83735 ASSAY OF MAGNESIUM: CPT | Performed by: INTERNAL MEDICINE

## 2018-02-12 PROCEDURE — 84100 ASSAY OF PHOSPHORUS: CPT | Performed by: INTERNAL MEDICINE

## 2018-02-12 PROCEDURE — 84244 ASSAY OF RENIN: CPT | Performed by: INTERNAL MEDICINE

## 2018-02-12 PROCEDURE — 99233 SBSQ HOSP IP/OBS HIGH 50: CPT | Performed by: NURSE PRACTITIONER

## 2018-02-12 RX ORDER — LABETALOL HYDROCHLORIDE 5 MG/ML
10 INJECTION, SOLUTION INTRAVENOUS EVERY 6 HOURS PRN
Status: DISCONTINUED | OUTPATIENT
Start: 2018-02-12 | End: 2018-02-19 | Stop reason: HOSPADM

## 2018-02-12 RX ORDER — LABETALOL 100 MG/1
100 TABLET, FILM COATED ORAL EVERY 12 HOURS SCHEDULED
Status: DISCONTINUED | OUTPATIENT
Start: 2018-02-12 | End: 2018-02-13

## 2018-02-12 RX ADMIN — INSULIN LISPRO 4 UNITS: 100 INJECTION, SOLUTION INTRAVENOUS; SUBCUTANEOUS at 17:24

## 2018-02-12 RX ADMIN — INSULIN LISPRO 1 UNITS: 100 INJECTION, SOLUTION INTRAVENOUS; SUBCUTANEOUS at 08:16

## 2018-02-12 RX ADMIN — HYDRALAZINE HYDROCHLORIDE 10 MG: 20 INJECTION INTRAMUSCULAR; INTRAVENOUS at 00:12

## 2018-02-12 RX ADMIN — HEPARIN SODIUM 5000 UNITS: 5000 INJECTION, SOLUTION INTRAVENOUS; SUBCUTANEOUS at 21:28

## 2018-02-12 RX ADMIN — HEPARIN SODIUM 5000 UNITS: 5000 INJECTION, SOLUTION INTRAVENOUS; SUBCUTANEOUS at 05:07

## 2018-02-12 RX ADMIN — INSULIN LISPRO 4 UNITS: 100 INJECTION, SOLUTION INTRAVENOUS; SUBCUTANEOUS at 08:19

## 2018-02-12 RX ADMIN — SODIUM CHLORIDE 5 MG/HR: 0.9 INJECTION, SOLUTION INTRAVENOUS at 09:37

## 2018-02-12 RX ADMIN — HEPARIN SODIUM 5000 UNITS: 5000 INJECTION, SOLUTION INTRAVENOUS; SUBCUTANEOUS at 13:58

## 2018-02-12 RX ADMIN — LABETALOL HYDROCHLORIDE 100 MG: 100 TABLET, FILM COATED ORAL at 17:23

## 2018-02-12 RX ADMIN — HYDRALAZINE HYDROCHLORIDE 100 MG: 25 TABLET, FILM COATED ORAL at 13:57

## 2018-02-12 RX ADMIN — NIFEDIPINE 60 MG: 30 TABLET, FILM COATED, EXTENDED RELEASE ORAL at 17:23

## 2018-02-12 RX ADMIN — HYDRALAZINE HYDROCHLORIDE 10 MG: 20 INJECTION INTRAMUSCULAR; INTRAVENOUS at 20:26

## 2018-02-12 RX ADMIN — ATORVASTATIN CALCIUM 40 MG: 40 TABLET, FILM COATED ORAL at 17:24

## 2018-02-12 RX ADMIN — INSULIN LISPRO 2 UNITS: 100 INJECTION, SOLUTION INTRAVENOUS; SUBCUTANEOUS at 21:30

## 2018-02-12 RX ADMIN — CLOPIDOGREL BISULFATE 75 MG: 75 TABLET ORAL at 08:15

## 2018-02-12 RX ADMIN — SODIUM CHLORIDE 2.5 MG/HR: 0.9 INJECTION, SOLUTION INTRAVENOUS at 01:12

## 2018-02-12 RX ADMIN — CARVEDILOL 25 MG: 12.5 TABLET, FILM COATED ORAL at 08:14

## 2018-02-12 RX ADMIN — HYDRALAZINE HYDROCHLORIDE 100 MG: 25 TABLET, FILM COATED ORAL at 21:31

## 2018-02-12 RX ADMIN — LABETALOL 20 MG/4 ML (5 MG/ML) INTRAVENOUS SYRINGE 10 MG: at 23:44

## 2018-02-12 RX ADMIN — HYDRALAZINE HYDROCHLORIDE 100 MG: 25 TABLET, FILM COATED ORAL at 05:07

## 2018-02-12 RX ADMIN — INSULIN LISPRO 4 UNITS: 100 INJECTION, SOLUTION INTRAVENOUS; SUBCUTANEOUS at 11:58

## 2018-02-12 RX ADMIN — ESCITALOPRAM OXALATE 10 MG: 10 TABLET ORAL at 08:15

## 2018-02-12 RX ADMIN — INSULIN LISPRO 3 UNITS: 100 INJECTION, SOLUTION INTRAVENOUS; SUBCUTANEOUS at 11:58

## 2018-02-12 RX ADMIN — FOLIC ACID 1 MG: 1 TABLET ORAL at 08:15

## 2018-02-12 RX ADMIN — SODIUM CHLORIDE 7.5 MG/HR: 0.9 INJECTION, SOLUTION INTRAVENOUS at 05:07

## 2018-02-12 RX ADMIN — INSULIN GLARGINE 6 UNITS: 100 INJECTION, SOLUTION SUBCUTANEOUS at 21:28

## 2018-02-12 RX ADMIN — NIFEDIPINE 60 MG: 30 TABLET, FILM COATED, EXTENDED RELEASE ORAL at 08:14

## 2018-02-12 RX ADMIN — ASPIRIN 81 MG 162 MG: 81 TABLET ORAL at 08:15

## 2018-02-12 RX ADMIN — INSULIN LISPRO 1 UNITS: 100 INJECTION, SOLUTION INTRAVENOUS; SUBCUTANEOUS at 17:24

## 2018-02-12 RX ADMIN — INSULIN GLARGINE 6 UNITS: 100 INJECTION, SOLUTION SUBCUTANEOUS at 08:16

## 2018-02-12 NOTE — PROGRESS NOTES
Progress Note - ICU Transfer to [facility] [place] 27 [facility]  [facility] MRN: [id number]  [facility]   Unit/Bed#:  Encounter: [id number]    Code Status: Level 1 - Full Code  POA:    POLST:      Reason for ICU admission:  Hypertensive urgency on IV Cardene, LLUVIA/CKD stage IV with hyperkalemia    Active problems:   Principal Problem:    Hypertensive urgency  Active Problems:    Type 1 diabetes mellitus with diabetic nephropathy, with long-term current use of insulin (HCC)    History of lacunar cerebrovascular accident (CVA)    Anemia in stage 3 chronic kidney disease    Chronic kidney disease, stage 4 (severe) ([facility] [address] )  Resolved Problems:    Acute renal failure superimposed on stage 3 chronic kidney disease (HCC)    Nausea    Accelerated hypertension    Acute hyperkalemia    Leukocytosis (leucocytosis)    Acute kidney injury [facility])      Consultants:  Nephrology    History of Present Illness: Breanna Sorto is a 27 y o  male who presents for evaluation of hypertension has a history of high blood pressure history of x2 CVA (most recent last month), CKD stage IV likely due to diabetic nephropathy, DM type I,  patient was recently discharged last Monday from the hospital Today the patient kept checking his blood pressure was persistently increased, he denied any illness, denies pain but admits to some nausea and headache  His home regimes is Nifedipine ER 60 mg in p m ,  carvedilol 25 mg b i d , hydralazine 100 mg t i d  and torsemide 20 mg in a m  he is compliant with his medication  The has a history of CVA + 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, elevated homocysteine levels and heterozygote PT gene mutation  Which he remains on aspirin Plavix and statin for, he is following up with Neurology on a continuous basis  he participates in PT and OT  He has outpatient nephrology follow-ups for acute renal failure on stage IV chronic kidney disease   On this admission his creatinine went to 4 and his potassium levels increased to 7 4, he was treated in the ER with dextrose Kayexalate insulin 6 units  Will add normal saline a 1000 bolus with 80 of Lasix, also art albuterol neb, notified nephrology Dr Bethany Martins about the patient and agrees with said plan, critical care attending Dr Jeanne Cowden was notified about the step-down 1 admit  Placed in ICU bed in case the need for dialysis occurs  Summary of clinical course:  Patient was treated initially for his hyperkalemia in the emergency department with the interventions listed above, he was started on IV Cardene infusion for hypertensive urgency  His home medication nifedipine was up titrated to 60 mg b i d  yesterday, in addition to continuing his current home medication antihypertensive regimen  Nephrology was consulted for his acute kidney injury on his superimposed CKD stage 4 with hyperkalemia  Renal artery Doppler evaluation was performed with normal appearance of both kidneys  Pending serum metanephrines to rule out pheochromocytoma  Today the patient was weaned off of IV Cardene, his blood pressures were stable in the 140s to 160s  The patient's home medication Coreg was discontinued and was subsequently transitioned to labetalol 100 mg b i d  Recent or scheduled procedures:   2/11/2018- Renal Artery Duplex Normal sonographic appearance of the kidneys  2/11/2018- CXR- Right Lowe Lobe Consolidation     Outstanding/pending diagnostics:  None    Cultures:  None       Mobilization Plan:  Early mobilization initiated, out of bed/ambulatory  Nutrition Plan:  Pre renal, CCD 2 diet    Discharge Plan:   Patient should be ready for discharge once medically/stable ready per SLIM  Recent adjustment to patient's blood pressure medication we will continue to monitor how the patient tolerates this adjustment       Initial Physical Therapy Recommendations: Not consulted, no needs  Initial Occupational Therapy Recommendations:  Not consulted, no needs  Initial /Plan:  See case management daily progress note    Home medications that are not reordered and reason why:  Torsemide- due to LLUVIA     Specific Diagnosis Plan:  1 ) Hypertensive urgency  2 ) AKA I/CKD 4 with hyperkalemia  3 ) Type 1 DM  4 ) Recent CVA at velia with prothrombin gene mutation  5 )  Chronic anemia    See critical care progress note for full assessment and plan        Spoke with Dr Purcell Boas regarding transfer  Please call [phone] with any questions or concerns  Portions of the record may have been created with voice recognition software  Occasional wrong word or "sound a like" substitutions may have occurred due to the inherent limitations of voice recognition software  Read the chart carefully and recognize, using context, where substitutions have occurred      DEISI Selby

## 2018-02-12 NOTE — PROGRESS NOTES
Progress Note - Critical Care   Álvaro Mckeon 27 y o  male MRN: [id number]  Unit/Bed#:  Encounter: [id number]    Impression:  Principal Problem:    Hypertensive urgency  Active Problems:    Acute renal failure superimposed on stage 3 chronic kidney disease (HCC)    Type 1 diabetes mellitus with diabetic nephropathy, with long-term current use of insulin ([facility])    History of lacunar cerebrovascular accident (CVA)    Vitamin D deficiency    Hyperhomocysteinemia (HCC)    Accelerated hypertension    Acute hyperkalemia    Anemia in stage 3 chronic kidney disease    Leukocytosis (leucocytosis)    Acute kidney injury ([address] )    Chronic kidney disease, stage 4 (severe) ([facility])    Persistent proteinuria  Resolved Problems:    Nausea      Plan:  Neuro:    pain control with PO pain medication   CAM-ICU score daily   Delirium precautions   Patient was discharged 2/5/2018  In July of 2015 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 and also heterozygote for prothrombin gene mutation   Neurology was seeing him on last admission and he has follow-ups as outpatient with Neurology for the past admission for CVA  Patient has deficit on the left side from last admission stroke, visual impairment which is improving        CV:   Control BP with home medications increased nifedipine, gave hydralazine IV prn overnight had to be restarted on cardene at midnight SBP > 180  HR controlled   continue ASA/ PLavix    Hydralazine 100 mg q 8, coreg 25 mg b i d , torsemide 40 mg daily, and nifedipine 60 mg b i d      Lung:   Incentive spirometer  Wean FIO2 for PO2 > 92 %   HOB > 30         GI:   PPIs - continue home treatment  Diet on diabetic diet carb controlled, renal diet     FEN: Replete electrolytes as needed, DVT prophylaxis     :    Monitor ins and outs  Creatinine increased to 4 down to 3 84  Potassium was 7 4 -5 8-5 4-5 3  Treated acute hyperkalemia, volume resuscitation, with Lasix, D50 insulin and Kayexalate on admission repeat Kayexalate yesterday  Nephrology is following for possible dialysis as needed  Creatinine baseline is 2 4 now at [address]  Dr Marleta Dandy  Outpatient nephrology   Arterial duplexes complete for kidney pending      ID: Monitor white count and temperature  No acute issues  Heme:   monitor hemoglobin and white count baseline  anemia due to chronic rate kidney disease  Heme-Onc is following outpatient      Endo: Insulin sliding scale, with meal homelog and Lantus is p m  and a m      MSK:  Moves all extremities  Disposition:  Step-down level 1    Reason for Admission:  Hypertension hyperkalemia    HPI/24hr events:   Was of Cardene for several hours, blood pressure increased again gave hydralazine p r n  no improvement started back up on Cardene      Physical Exam     General:  Appears stated age of 27 , in no acute distress, well nourished   Head: NC/ AT   HEENT:  PERRLA, patient has a left facial droop from a previous stroke and visual disturbances on the left  Neck: supple, no JVD, carotids 2 +, trachea midline   Chest: Clear to auscultation bilaterally, no rhonchi  No wheezing  No rales,   CV: RRR, no murmurs, S1/ S2 normal, no rubs  Abd: Soft and non tender, non distended, bowel sounds are present,   MSK: move all extremities   Pulses: +2 femoral, no bruit, brachial, radial, DP/ PT +2    Neuro: Alert and oriented ×3, Sensation intact  Vitals:   Vitals:    18 0400 18 0410 18 0430 18 0507   BP: 167/79 164/75 160/74 165/77   BP Location:   Right arm    Pulse: 91 90 87    Resp: (!) 23 20 18    Temp:   98 3 °F (36 8 °C)    TempSrc:   Oral    SpO2: 95% 95% 94%    Weight:       Height:                 Tele Rhythm: NSR This was personally reviewed by myself      Temperature: Temp (24hrs), Av 3 °F (36 8 °C), Min:98 1 °F (36 7 °C), Max:98 5 °F (36 9 °C)  Current: Temperature: 98 3 °F (36 8 °C)    Weights: IBW: 75 3 kg  Body mass index is 31 67 kg/m²  Hemodynamic Monitoring:  N/A       Respiratory:  SpO2: SpO2: 94 %, SpO2 Activity: SpO2 Activity: At Rest, SpO2 Device: O2 Device: None (Room air)       Intake and Outputs:    Intake/Output Summary (Last 24 hours) at 02/12/18 0552  Last data filed at 02/12/18 0200   Gross per 24 hour   Intake          2303 34 ml   Output             2785 ml   Net          -481 66 ml     I/O last 24 hours: In: 4991 7 [P O :960; I V :3031 7; IV Piggyback:1000]  Out: 3950 [Urine:3950]    UOP: 100-200/hour     Nutrition:        Diet Orders            Start     Ordered    02/10/18 2155  Diet Renal; PreRenal (no dialysis); Consistent Carbohydrate Diet Level 2 (5 carb servings/75 grams CHO/meal)  Diet effective now     Question Answer Comment   Diet Type Renal    Renal PreRenal (no dialysis)    Other Restriction(s): Consistent Carbohydrate Diet Level 2 (5 carb servings/75 grams CHO/meal)    RD to adjust diet per protocol?  No        02/10/18 2154        Labs:     Results from last 7 days  Lab Units 02/12/18  0453 02/11/18  0254 02/10/18  2208 02/10/18  1744   WBC Thousand/uL 9 23 9 49  --  10 89*   HEMOGLOBIN g/dL 7 9* 7 7*  --  8 7*   HEMATOCRIT % 23 7* 23 4*  --  26 2*   PLATELETS Thousands/uL 199 189 185 261   NEUTROS PCT % 70  --   --  76*   MONOS PCT % 6  --   --  4       Results from last 7 days  Lab Units 02/12/18  0453 02/12/18  0005 02/11/18  1616  02/11/18  0254 02/10/18  2208   SODIUM mmol/L 142 142 142  < > 141 142   POTASSIUM mmol/L 5 2 5 3 5 4*  < > 5 5* 5 8*   CHLORIDE mmol/L 111* 111* 110*  < > 111* 109*   CO2 mmol/L 22 24 21  < > 22 21   BUN mg/dL 35* 36* 32*  < > 38* 37*   CREATININE mg/dL 3 68* 3 84* 3 82*  < > 3 90* 3 86*   CALCIUM mg/dL 8 4 8 3 8 6  < > 8 5 9 0   TOTAL PROTEIN g/dL  --   --   --   --  5 4* 5 6*   BILIRUBIN TOTAL mg/dL  --   --   --   --  0 30 0 40   ALK PHOS U/L  --   --   --   --  73 78   ALT U/L  --   --   --   --  22 26   AST U/L  --   --   --   --  15 16 GLUCOSE RANDOM mg/dL 91 107 188*  < > 153* 235*   < > = values in this interval not displayed  Results from last 7 days  Lab Units 02/12/18  0453 02/11/18  0254 02/10/18  2208   MAGNESIUM mg/dL 1 8 1 9 2 0       Results from last 7 days  Lab Units 02/12/18  0453 02/11/18  0254   PHOSPHORUS mg/dL 4 8* 4 4        Results from last 7 days  Lab Units 02/12/18  0458   INR  0 90           0  Lab Value Date/Time   TROPONINI 0 03 02/10/2018 2208   TROPONINI 0 02 02/10/2018 1744   TROPONINI 0 03 01/22/2018 2130     ABG:      Imaging:   CXR: clear I have personally reviewed pertinent reports  Micro:   Blood Culture: No results found for: BLOODCX  Urine Culture: No results found for: URINECX  Sputum Culture: No components found for: SPUTUMCX  Wound Culure: No results found for: WOUNDCULT        Allergies:    Allergies   Allergen Reactions    Sulfa Antibiotics Rash       Medications:   Scheduled Meds:    Current Facility-Administered Medications:  aspirin 162 mg Oral Daily Sindi Vergara PA-C    atorvastatin 40 mg Oral QPM Sindi Vergara PA-C    carvedilol 25 mg Oral BID With Meals Sindi Vergara PA-C    clopidogrel 75 mg Oral Daily Sindi Vergara PA-C    [START ON 2/16/2018] ergocalciferol 50,000 Units Oral Weekly Erika Green PA-C    escitalopram 10 mg Oral Daily Sindi Vergara PA-C    folic acid 1 mg Oral Daily Sindi Vergara PA-C    heparin (porcine) 5,000 Units Subcutaneous Q8H [facility] Sindi Vergara PA-C    hydrALAZINE 10 mg Intravenous Q6H PRN Wilma Pennington PA-C    hydrALAZINE 100 mg Oral [facility] Manuela Paris MD    insulin glargine 6 Units Subcutaneous Daily With Breakfast Sindi Vergara PA-C    insulin glargine 6 Units Subcutaneous HS Sindi Vergara PA-C    insulin lispro 1-5 Units Subcutaneous HS Sindi Vergara PA-C    insulin lispro 1-6 Units Subcutaneous TID AC Aleenamarie Opperman, PA-C    insulin lispro 4 Units Subcutaneous Daily With Breakfast Sindi Vergara PA-C    insulin lispro 4 Units Subcutaneous Daily With Lunch Sindi Vergara PA-C    insulin lispro 4 Units Subcutaneous Daily With Dinner Sindi Vergara PA-C    niCARdipine 5 mg/hr Intravenous Titrated Prince Bai MD Last Rate: 7 5 mg/hr (02/12/18 0507)   NIFEdipine ER 60 mg Oral BID (diuretic) Venecia Olson MD    sodium polystyrene sulfonate 15 g Oral Once Severiano Deal, PA-C        VTE Pharmacologic Prophylaxis: Sequential compression device (Venodyne)  heparin sq   VTE Mechanical Prophylaxis: sequential compression device    Continuous Infusions:    niCARdipine 5 mg/hr Last Rate: 7 5 mg/hr (02/12/18 0507)     PRN Meds:    hydrALAZINE 10 mg Q6H PRN       Invasive lines and devices: Invasive Devices     Peripheral Intravenous Line            Peripheral IV 02/10/18 Left Antecubital 1 day    Peripheral IV 02/10/18 Right Antecubital 1 day                Code Status: Level 1 - Full Code    Counseling / Coordination of Care  Total Critical Care time spent 30 minutes excluding procedures, teaching and family updates        Zee Trejo PA-C  DATE: February 12, 2018  TIME: 5:52 AM

## 2018-02-13 ENCOUNTER — TELEPHONE (OUTPATIENT)
Dept: NEUROLOGY | Facility: CLINIC | Age: 33
End: 2018-02-13

## 2018-02-13 LAB
ANION GAP SERPL CALCULATED.3IONS-SCNC: 10 MMOL/L (ref 4–13)
BUN SERPL-MCNC: 35 MG/DL (ref 5–25)
CALCIUM SERPL-MCNC: 8.5 MG/DL (ref 8.3–10.1)
CHLORIDE SERPL-SCNC: 110 MMOL/L (ref 100–108)
CO2 SERPL-SCNC: 21 MMOL/L (ref 21–32)
CREAT SERPL-MCNC: 3.73 MG/DL (ref 0.6–1.3)
GFR SERPL CREATININE-BSD FRML MDRD: 20 ML/MIN/1.73SQ M
GLUCOSE SERPL-MCNC: 185 MG/DL (ref 65–140)
GLUCOSE SERPL-MCNC: 186 MG/DL (ref 65–140)
GLUCOSE SERPL-MCNC: 253 MG/DL (ref 65–140)
GLUCOSE SERPL-MCNC: 276 MG/DL (ref 65–140)
GLUCOSE SERPL-MCNC: 410 MG/DL (ref 65–140)
POTASSIUM SERPL-SCNC: 5.1 MMOL/L (ref 3.5–5.3)
SODIUM SERPL-SCNC: 141 MMOL/L (ref 136–145)

## 2018-02-13 PROCEDURE — 99233 SBSQ HOSP IP/OBS HIGH 50: CPT | Performed by: NURSE PRACTITIONER

## 2018-02-13 PROCEDURE — 99233 SBSQ HOSP IP/OBS HIGH 50: CPT | Performed by: FAMILY MEDICINE

## 2018-02-13 PROCEDURE — 82948 REAGENT STRIP/BLOOD GLUCOSE: CPT

## 2018-02-13 PROCEDURE — 80048 BASIC METABOLIC PNL TOTAL CA: CPT | Performed by: NURSE PRACTITIONER

## 2018-02-13 RX ORDER — TORSEMIDE 20 MG/1
20 TABLET ORAL ONCE
Status: DISCONTINUED | OUTPATIENT
Start: 2018-02-13 | End: 2018-02-19 | Stop reason: HOSPADM

## 2018-02-13 RX ORDER — LABETALOL 100 MG/1
100 TABLET, FILM COATED ORAL EVERY 8 HOURS SCHEDULED
Status: DISCONTINUED | OUTPATIENT
Start: 2018-02-13 | End: 2018-02-13

## 2018-02-13 RX ORDER — LABETALOL 100 MG/1
200 TABLET, FILM COATED ORAL EVERY 8 HOURS SCHEDULED
Status: DISCONTINUED | OUTPATIENT
Start: 2018-02-13 | End: 2018-02-17

## 2018-02-13 RX ORDER — INSULIN GLARGINE 100 [IU]/ML
7 INJECTION, SOLUTION SUBCUTANEOUS
Status: DISCONTINUED | OUTPATIENT
Start: 2018-02-14 | End: 2018-02-17

## 2018-02-13 RX ORDER — HYDRALAZINE HYDROCHLORIDE 25 MG/1
100 TABLET, FILM COATED ORAL EVERY 8 HOURS SCHEDULED
Status: DISCONTINUED | OUTPATIENT
Start: 2018-02-13 | End: 2018-02-19 | Stop reason: HOSPADM

## 2018-02-13 RX ORDER — LABETALOL HYDROCHLORIDE 5 MG/ML
10 INJECTION, SOLUTION INTRAVENOUS ONCE
Status: COMPLETED | OUTPATIENT
Start: 2018-02-13 | End: 2018-02-13

## 2018-02-13 RX ORDER — LABETALOL 200 MG/1
200 TABLET, FILM COATED ORAL EVERY 8 HOURS SCHEDULED
Status: DISCONTINUED | OUTPATIENT
Start: 2018-02-13 | End: 2018-02-13

## 2018-02-13 RX ADMIN — HYDRALAZINE HYDROCHLORIDE 10 MG: 20 INJECTION INTRAMUSCULAR; INTRAVENOUS at 16:38

## 2018-02-13 RX ADMIN — INSULIN GLARGINE 6 UNITS: 100 INJECTION, SOLUTION SUBCUTANEOUS at 21:17

## 2018-02-13 RX ADMIN — INSULIN LISPRO 1 UNITS: 100 INJECTION, SOLUTION INTRAVENOUS; SUBCUTANEOUS at 21:18

## 2018-02-13 RX ADMIN — HYDRALAZINE HYDROCHLORIDE 100 MG: 25 TABLET, FILM COATED ORAL at 21:18

## 2018-02-13 RX ADMIN — LABETALOL 20 MG/4 ML (5 MG/ML) INTRAVENOUS SYRINGE 10 MG: at 18:28

## 2018-02-13 RX ADMIN — LABETALOL HYDROCHLORIDE 100 MG: 100 TABLET, FILM COATED ORAL at 05:13

## 2018-02-13 RX ADMIN — ASPIRIN 81 MG 162 MG: 81 TABLET ORAL at 08:51

## 2018-02-13 RX ADMIN — HEPARIN SODIUM 5000 UNITS: 5000 INJECTION, SOLUTION INTRAVENOUS; SUBCUTANEOUS at 05:12

## 2018-02-13 RX ADMIN — HEPARIN SODIUM 5000 UNITS: 5000 INJECTION, SOLUTION INTRAVENOUS; SUBCUTANEOUS at 15:00

## 2018-02-13 RX ADMIN — HYDRALAZINE HYDROCHLORIDE 10 MG: 20 INJECTION INTRAMUSCULAR; INTRAVENOUS at 04:47

## 2018-02-13 RX ADMIN — LABETALOL HYDROCHLORIDE 10 MG: 5 INJECTION, SOLUTION INTRAVENOUS at 12:43

## 2018-02-13 RX ADMIN — ATORVASTATIN CALCIUM 40 MG: 40 TABLET, FILM COATED ORAL at 17:32

## 2018-02-13 RX ADMIN — NIFEDIPINE 60 MG: 30 TABLET, FILM COATED, EXTENDED RELEASE ORAL at 08:20

## 2018-02-13 RX ADMIN — HYDRALAZINE HYDROCHLORIDE 100 MG: 25 TABLET, FILM COATED ORAL at 05:13

## 2018-02-13 RX ADMIN — INSULIN LISPRO 4 UNITS: 100 INJECTION, SOLUTION INTRAVENOUS; SUBCUTANEOUS at 11:36

## 2018-02-13 RX ADMIN — LABETALOL 20 MG/4 ML (5 MG/ML) INTRAVENOUS SYRINGE 10 MG: at 01:27

## 2018-02-13 RX ADMIN — FOLIC ACID 1 MG: 1 TABLET ORAL at 08:51

## 2018-02-13 RX ADMIN — ESCITALOPRAM OXALATE 10 MG: 10 TABLET ORAL at 08:51

## 2018-02-13 RX ADMIN — INSULIN GLARGINE 6 UNITS: 100 INJECTION, SOLUTION SUBCUTANEOUS at 08:20

## 2018-02-13 RX ADMIN — NIFEDIPINE 60 MG: 30 TABLET, FILM COATED, EXTENDED RELEASE ORAL at 16:38

## 2018-02-13 RX ADMIN — LABETALOL 20 MG/4 ML (5 MG/ML) INTRAVENOUS SYRINGE 10 MG: at 11:35

## 2018-02-13 RX ADMIN — INSULIN LISPRO 4 UNITS: 100 INJECTION, SOLUTION INTRAVENOUS; SUBCUTANEOUS at 08:53

## 2018-02-13 RX ADMIN — INSULIN LISPRO 4 UNITS: 100 INJECTION, SOLUTION INTRAVENOUS; SUBCUTANEOUS at 16:39

## 2018-02-13 RX ADMIN — INSULIN LISPRO 4 UNITS: 100 INJECTION, SOLUTION INTRAVENOUS; SUBCUTANEOUS at 16:38

## 2018-02-13 RX ADMIN — LABETALOL HYDROCHLORIDE 10 MG: 5 INJECTION, SOLUTION INTRAVENOUS at 03:27

## 2018-02-13 RX ADMIN — INSULIN LISPRO 3 UNITS: 100 INJECTION, SOLUTION INTRAVENOUS; SUBCUTANEOUS at 08:51

## 2018-02-13 RX ADMIN — CLOPIDOGREL BISULFATE 75 MG: 75 TABLET ORAL at 08:51

## 2018-02-13 RX ADMIN — INSULIN LISPRO 6 UNITS: 100 INJECTION, SOLUTION INTRAVENOUS; SUBCUTANEOUS at 11:35

## 2018-02-13 RX ADMIN — LABETALOL HYDROCHLORIDE 200 MG: 100 TABLET, FILM COATED ORAL at 21:18

## 2018-02-13 NOTE — ASSESSMENT & PLAN NOTE
Known history of type 1 diabetes    Currently on insulin-Lantus and sliding scale  Monitor blood sugar  Adjust the dose of his Lantus slightly to improve the glycemic control

## 2018-02-13 NOTE — TELEPHONE ENCOUNTER
2nd message left to R/S 2/15/18 HFU appt   Needs to be 6-8 weeks after hospitalization and needs to be with Monika Alfaro

## 2018-02-13 NOTE — CASE MANAGEMENT
Requested DC Summary for case #[id number]    Mary Shipman DO Physician Signed Hospitalist  Discharge Summaries Date of Service: 2/5/2018  2:35 PM         []Hide copied text     Discharge Summary - [address] Internal Medicine     Patient Information: Terence Clark 27 y o  male MRN: [id number]  Unit/Bed#: -01 Encounter: [id number]     Discharging Physician / Practitioner: Mary Shipman DO  PCP: Umair Rangel DO  Admission Date: 1/22/2018  Discharge Date: 02/05/18     Disposition:      Home      Reason for Admission:   Visual disturbances secondary to subacute CVA  Hypertensive urgency  Acute renal failure on chronic kidney disease     Discharge Diagnoses:      Principal Problem:    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,    Active Problems:    Cerebrovascular accident (CVA) of left pontine structure ([address] )    Acute renal failure superimposed on stage 3 chronic kidney disease ([address] )    Hypertensive urgency    Type 1 diabetes mellitus with diabetic nephropathy, with long-term current use of insulin ([facility] )    History of lacunar cerebrovascular accident (CVA)    Vitamin D deficiency    Hyperhomocysteinemia ([address] )  Resolved Problems:    Nausea    Hyperphosphatemia    Azotemia        Consultations During Hospital Stay:  · Neurology  · Nephrology  · Hematology  · Endocrine  · After monitor the     Procedures Performed:   Plasmapheresis  Lumbar puncture with negative MS panel  Brain MRI    1  Similar-appearing small focus of subacute infarct along the posterior aspect of the velia near the facial colliculus (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)  2  Unchanged white matter regions of signal abnormality superiorly related to demyelinating disease or other white matter etiologies  3  Increased signal abnormality in the right anterior genu corpus callosum and right thalamus which could be from retained contrast from recent MRI    4  Diffusely increased subarachnoid FLAIR signal likely due to any recent lumbar puncture, oxygenation or metabolic etiology      Normal renal ultrasound  Normal head and neck CTA     Significant Findings / Test Results:      · As above     Incidental Findings:   · none     Test Results Pending at Discharge (will require follow up):   · none     Outpatient Tests Requested:  · CBC BMP in 1 week     Complications:  none     Hospital Course:      Hailee Mcintosh is a 27 y o  male patient who originally presented to the hospital on 1/22/2018 due to pleural effusion and left lateral gaze palsy  Patient was evaluated in the emergency room, he was found to have accelerated hypertension, acute on chronic renal failure,  and negative head and neck CTA  Patient was admitted the hospital Neurology consulted, further workup including brain MRI, cervical spine and thoracic spine MRI, lumbar puncture revealed subacute CVA with enhancement on MRI lumbar puncture was negative for MS, patient treated with steroid without improvement, then he was started on plasmapheresis, it was felt his visual disturbances is secondary to stroke, he was treated with aspirin, Plavix and statin, patient with previous CVA and positive history of prothrombin gene mutation, Hematology recommending no therapeutic anticoagulation at this time     Nephrology consulted regarding acute renal failure it was felt secondary to ATN and contrast induced nephropathy, his chronic kidney disease and proteinuria secondary to diabetes and hypertension, blood pressure medications were adjusted, started on Demadex for lower extremity edema, currently his kidney function is improving  Endocrine consulted regarding diabetes mellitus type 1 he was kept on insulin     Currently patient is in stable condition, he will be discharged home, he was started on Lexapro by Neurology for post stroke depression, he was instructed to follow with his family doctor in 1 week and with Hematology on 3/5/2018 in addition to Neurology in 4-6 weeks, patient also instructed to follow with Nephrology in 1 week     Outpatient physical therapy     Condition at Discharge: stable      Discharge Day Visit / Exam:      * Please refer to separate progress note for these details *     Discussion with Family:  Patient smaller        Discharge instructions/Information to patient and family:   See after visit summary for information provided to patient and family        Provisions for Follow-Up Care:  See after visit summary for information related to follow-up care and any pertinent home health orders        Planned Readmission: no      Discharge Statement:  I spent 36  minutes discharging the patient  This time was spent on the day of discharge  I had direct contact with the patient on the day of discharge  Greater than 50% of the total time was spent examining patient, answering all patient questions, arranging and discussing plan of care with patient as well as directly providing post-discharge instructions  Additional time then spent on discharge activities      Discharge Medications:  See after visit summary for reconciled discharge medications provided to patient and family        ** Please Note: This note has been constructed using a voice recognition system   **

## 2018-02-13 NOTE — ASSESSMENT & PLAN NOTE
Patient was weaned off of the Cardizem yesterday    Blood pressure still remains elevated requiring an IV bolus dose as today  Increase the dose of labetalol to 200 mg 3 times a day  Monitor blood pressure  Can be moved to the [facility] telemetry

## 2018-02-13 NOTE — PROGRESS NOTES
Progress Note Robson Santiago 1985, 27 y o  male MRN: [id number]    Unit/Bed#:  Encounter: [id number]    Primary Care Provider: Tri Lees DO   Date and time admitted to hospital: 2/10/2018  5:21 PM        Chronic kidney disease, stage 4 (severe) ([facility] )   Assessment & Plan    Creatinine is 3 7 3  Patient with good urine output  Nephrology on board  Possible new baseline  Trend creatinine        Anemia in stage 3 chronic kidney disease   Assessment & Plan    Monitor H andH        History of lacunar cerebrovascular accident (CVA)   Assessment & Plan    Continue with the current care, no deficits noted on exam        Type 1 diabetes mellitus with diabetic nephropathy, with long-term current use of insulin ([facility])   Assessment & Plan    Known history of type 1 diabetes  Currently on insulin-Lantus and sliding scale  Monitor blood sugar  Adjust the dose of his Lantus slightly to improve the glycemic control        * Hypertensive urgency   Assessment & Plan    Patient was weaned off of the Cardizem yesterday  Blood pressure still remains elevated requiring an IV bolus dose as today  Increase the dose of labetalol to 200 mg 3 times a day  Monitor blood pressure  Can be moved to the [facility] telemetry            VTE Pharmacologic Prophylaxis:   Pharmacologic: Heparin  Mechanical VTE Prophylaxis in Place: Yes    Patient Centered Rounds: I have performed bedside rounds with nursing staff today  Discussions with Specialists or Other Care Team Provider:  Nephrology and critical care    Education and Discussions with Family / Patient:  Family updated in the room    Time Spent for Care:  40 minutes  More than 50% of total time spent on counseling and coordination of care as described above      Current Length of Stay: 3 day(s)    Current Patient Status: Inpatient   Certification Statement: The patient will continue to require additional inpatient hospital stay due to Uncontrolled blood pressure    Discharge Plan:     Code Status: Level 1 - Full Code      Subjective:   Patient seen and examined  No specific complaints offered  No headache dizziness or lightheadedness  No chest pain  No change in vision    Objective:     Vitals:   Temp (24hrs), Av 6 °F (37 °C), Min:98 2 °F (36 8 °C), Max:99 1 °F (37 3 °C)    HR:  [77-97] 97  Resp:  [15-94] 20  BP: (146-198)/() 181/85  SpO2:  [96 %-100 %] 97 %  Body mass index is 32 29 kg/m²  Input and Output Summary (last 24 hours): Intake/Output Summary (Last 24 hours) at 18 0916  Last data filed at 18 0800   Gross per 24 hour   Intake           353 33 ml   Output             3520 ml   Net         -3166 67 ml       Physical Exam:     Physical Exam   Constitutional: He is oriented to person, place, and time  He appears well-developed and well-nourished  HENT:   Head: Normocephalic and atraumatic  Eyes: EOM are normal  Pupils are equal, round, and reactive to light  Neck: Normal range of motion  Cardiovascular: Normal rate and regular rhythm  No murmur heard  Pulmonary/Chest: Effort normal and breath sounds normal  No respiratory distress  He has no wheezes  Abdominal: Soft  Bowel sounds are normal  He exhibits no distension  Musculoskeletal: Normal range of motion  He exhibits no edema  Neurological: He is alert and oriented to person, place, and time  No cranial nerve deficit  Skin: Skin is warm and dry  No erythema         Additional Data:     Labs:      Results from last 7 days  Lab Units 18  0453   WBC Thousand/uL 9 23   HEMOGLOBIN g/dL 7 9*   HEMATOCRIT % 23 7*   PLATELETS Thousands/uL 199   NEUTROS PCT % 70   LYMPHS PCT % 19   MONOS PCT % 6   EOS PCT % 4       Results from last 7 days  Lab Units 18  0450  18  0254   SODIUM mmol/L 141  < > 141   POTASSIUM mmol/L 5 1  < > 5 5*   CHLORIDE mmol/L 110*  < > 111*   CO2 mmol/L 21  < > 22   BUN mg/dL 35*  < > 38*   CREATININE mg/dL 3 73*  < > 3 90*   CALCIUM mg/dL 8 5  < > 8 5   TOTAL PROTEIN g/dL  --   --  5 4*   BILIRUBIN TOTAL mg/dL  --   --  0 30   ALK PHOS U/L  --   --  73   ALT U/L  --   --  22   AST U/L  --   --  15   GLUCOSE RANDOM mg/dL 185*  < > 153*   < > = values in this interval not displayed  Results from last 7 days  Lab Units 02/12/18  0458   INR  0 90       * I Have Reviewed All Lab Data Listed Above  * Additional Pertinent Lab Tests Reviewed:  [address] Admission Reviewed    Imaging:    Imaging Reports Reviewed Today Include:  Renal artery  Doppler  Imaging Personally Reviewed by Myself Includes:      Recent Cultures (last 7 days):           Last 24 Hours Medication List:     Current Facility-Administered Medications:  aspirin 162 mg Oral Daily Sindi Vergara PA-C   atorvastatin 40 mg Oral QPM Sindi Vergara PA-C   clopidogrel 75 mg Oral Daily Sindi Vergara PA-C   [START ON 2/16/2018] ergocalciferol 50,000 Units Oral Weekly Erika Green PA-C   escitalopram 10 mg Oral Daily Sindi Vergara PA-C   folic acid 1 mg Oral Daily Sindi Vergara PA-C   heparin (porcine) 5,000 Units Subcutaneous Q8H [address] Sindi Vergara PA-C   hydrALAZINE 10 mg Intravenous Q6H PRN Wilma Pennington PA-C   hydrALAZINE 100 mg Oral [facility] Umair Alvarez MD   insulin glargine 6 Units Subcutaneous HS Sindi Vergara PA-C   [START ON 2/14/2018] insulin glargine 7 Units Subcutaneous Daily With Breakfast Patrice Cotter MD   insulin lispro 1-5 Units Subcutaneous HS Sindi Vergara PA-C   insulin lispro 1-6 Units Subcutaneous TID AC Sindi Vergara PA-C   insulin lispro 4 Units Subcutaneous Daily With Breakfast Sindi Vergara PA-C   insulin lispro 4 Units Subcutaneous Daily With Lunch Sindi Vergara PA-C   insulin lispro 4 Units Subcutaneous Daily With Dinner Sindi Vergara PA-C   labetalol 10 mg Intravenous Q6H PRN DEISI Wisdom   labetalol 200 mg Oral [facility] Anya Lama, CRNP   NIFEdipine ER 60 mg Oral BID (diuretic) Beatris Hatchet, MD        Today, Patient Was Seen By: Aixa Wright MD    ** Please Note: Dictation voice to text software may have been used in the creation of this document   **

## 2018-02-13 NOTE — ASSESSMENT & PLAN NOTE
Creatinine is 3 7 3  Patient with good urine output  Nephrology on board  Possible new baseline  Trend creatinine

## 2018-02-14 ENCOUNTER — APPOINTMENT (OUTPATIENT)
Dept: PHYSICAL THERAPY | Facility: CLINIC | Age: 33
End: 2018-02-14
Payer: COMMERCIAL

## 2018-02-14 LAB
ALBUMIN SERPL BCP-MCNC: 2.6 G/DL (ref 3.5–5)
ALP SERPL-CCNC: 68 U/L (ref 46–116)
ALT SERPL W P-5'-P-CCNC: 17 U/L (ref 12–78)
ANION GAP SERPL CALCULATED.3IONS-SCNC: 11 MMOL/L (ref 4–13)
AST SERPL W P-5'-P-CCNC: 15 U/L (ref 5–45)
BASOPHILS # BLD AUTO: 0.05 THOUSANDS/ΜL (ref 0–0.1)
BASOPHILS NFR BLD AUTO: 1 % (ref 0–1)
BILIRUB SERPL-MCNC: 0.4 MG/DL (ref 0.2–1)
BUN SERPL-MCNC: 34 MG/DL (ref 5–25)
CALCIUM SERPL-MCNC: 8.5 MG/DL (ref 8.3–10.1)
CHLORIDE SERPL-SCNC: 110 MMOL/L (ref 100–108)
CO2 SERPL-SCNC: 20 MMOL/L (ref 21–32)
CREAT SERPL-MCNC: 3.65 MG/DL (ref 0.6–1.3)
EOSINOPHIL # BLD AUTO: 0.22 THOUSAND/ΜL (ref 0–0.61)
EOSINOPHIL NFR BLD AUTO: 4 % (ref 0–6)
ERYTHROCYTE [DISTWIDTH] IN BLOOD BY AUTOMATED COUNT: 13.5 % (ref 11.6–15.1)
GFR SERPL CREATININE-BSD FRML MDRD: 20 ML/MIN/1.73SQ M
GLUCOSE SERPL-MCNC: 133 MG/DL (ref 65–140)
GLUCOSE SERPL-MCNC: 178 MG/DL (ref 65–140)
GLUCOSE SERPL-MCNC: 181 MG/DL (ref 65–140)
GLUCOSE SERPL-MCNC: 210 MG/DL (ref 65–140)
GLUCOSE SERPL-MCNC: 310 MG/DL (ref 65–140)
HCT VFR BLD AUTO: 20.3 % (ref 36.5–49.3)
HCT VFR BLD AUTO: 21.3 % (ref 36.5–49.3)
HEMOCCULT STL QL: NEGATIVE
HGB BLD-MCNC: 6.9 G/DL (ref 12–17)
HGB BLD-MCNC: 7.2 G/DL (ref 12–17)
LYMPHOCYTES # BLD AUTO: 1.37 THOUSANDS/ΜL (ref 0.6–4.47)
LYMPHOCYTES NFR BLD AUTO: 24 % (ref 14–44)
MCH RBC QN AUTO: 31.5 PG (ref 26.8–34.3)
MCHC RBC AUTO-ENTMCNC: 34 G/DL (ref 31.4–37.4)
MCV RBC AUTO: 93 FL (ref 82–98)
MONOCYTES # BLD AUTO: 0.33 THOUSAND/ΜL (ref 0.17–1.22)
MONOCYTES NFR BLD AUTO: 6 % (ref 4–12)
NEUTROPHILS # BLD AUTO: 3.85 THOUSANDS/ΜL (ref 1.85–7.62)
NEUTS SEG NFR BLD AUTO: 65 % (ref 43–75)
PLATELET # BLD AUTO: 178 THOUSANDS/UL (ref 149–390)
PMV BLD AUTO: 9.6 FL (ref 8.9–12.7)
POTASSIUM SERPL-SCNC: 5.2 MMOL/L (ref 3.5–5.3)
PROT SERPL-MCNC: 5.1 G/DL (ref 6.4–8.2)
RBC # BLD AUTO: 2.19 MILLION/UL (ref 3.88–5.62)
SODIUM SERPL-SCNC: 141 MMOL/L (ref 136–145)
WBC # BLD AUTO: 5.82 THOUSAND/UL (ref 4.31–10.16)

## 2018-02-14 PROCEDURE — 85018 HEMOGLOBIN: CPT | Performed by: FAMILY MEDICINE

## 2018-02-14 PROCEDURE — 82948 REAGENT STRIP/BLOOD GLUCOSE: CPT

## 2018-02-14 PROCEDURE — 80053 COMPREHEN METABOLIC PANEL: CPT | Performed by: FAMILY MEDICINE

## 2018-02-14 PROCEDURE — 99233 SBSQ HOSP IP/OBS HIGH 50: CPT | Performed by: NURSE PRACTITIONER

## 2018-02-14 PROCEDURE — 99232 SBSQ HOSP IP/OBS MODERATE 35: CPT | Performed by: FAMILY MEDICINE

## 2018-02-14 PROCEDURE — 85014 HEMATOCRIT: CPT | Performed by: FAMILY MEDICINE

## 2018-02-14 PROCEDURE — 82272 OCCULT BLD FECES 1-3 TESTS: CPT | Performed by: FAMILY MEDICINE

## 2018-02-14 PROCEDURE — 85025 COMPLETE CBC W/AUTO DIFF WBC: CPT | Performed by: FAMILY MEDICINE

## 2018-02-14 RX ORDER — CHLORTHALIDONE 25 MG/1
25 TABLET ORAL DAILY
Status: DISCONTINUED | OUTPATIENT
Start: 2018-02-14 | End: 2018-02-17

## 2018-02-14 RX ORDER — TORSEMIDE 20 MG/1
20 TABLET ORAL DAILY
Status: DISCONTINUED | OUTPATIENT
Start: 2018-02-14 | End: 2018-02-16

## 2018-02-14 RX ORDER — CLONIDINE HYDROCHLORIDE 0.1 MG/1
0.2 TABLET ORAL ONCE
Status: COMPLETED | OUTPATIENT
Start: 2018-02-14 | End: 2018-02-14

## 2018-02-14 RX ADMIN — ATORVASTATIN CALCIUM 40 MG: 40 TABLET, FILM COATED ORAL at 17:17

## 2018-02-14 RX ADMIN — NIFEDIPINE 60 MG: 30 TABLET, FILM COATED, EXTENDED RELEASE ORAL at 16:28

## 2018-02-14 RX ADMIN — NIFEDIPINE 60 MG: 30 TABLET, FILM COATED, EXTENDED RELEASE ORAL at 08:01

## 2018-02-14 RX ADMIN — INSULIN LISPRO 4 UNITS: 100 INJECTION, SOLUTION INTRAVENOUS; SUBCUTANEOUS at 07:57

## 2018-02-14 RX ADMIN — HYDRALAZINE HYDROCHLORIDE 10 MG: 20 INJECTION INTRAMUSCULAR; INTRAVENOUS at 16:28

## 2018-02-14 RX ADMIN — LABETALOL HYDROCHLORIDE 200 MG: 100 TABLET, FILM COATED ORAL at 21:34

## 2018-02-14 RX ADMIN — HEPARIN SODIUM 5000 UNITS: 5000 INJECTION, SOLUTION INTRAVENOUS; SUBCUTANEOUS at 21:34

## 2018-02-14 RX ADMIN — INSULIN LISPRO 2 UNITS: 100 INJECTION, SOLUTION INTRAVENOUS; SUBCUTANEOUS at 21:34

## 2018-02-14 RX ADMIN — INSULIN LISPRO 1 UNITS: 100 INJECTION, SOLUTION INTRAVENOUS; SUBCUTANEOUS at 07:58

## 2018-02-14 RX ADMIN — INSULIN LISPRO 5 UNITS: 100 INJECTION, SOLUTION INTRAVENOUS; SUBCUTANEOUS at 11:15

## 2018-02-14 RX ADMIN — HYDRALAZINE HYDROCHLORIDE 100 MG: 25 TABLET, FILM COATED ORAL at 13:23

## 2018-02-14 RX ADMIN — LABETALOL HYDROCHLORIDE 200 MG: 100 TABLET, FILM COATED ORAL at 13:23

## 2018-02-14 RX ADMIN — HYDRALAZINE HYDROCHLORIDE 100 MG: 25 TABLET, FILM COATED ORAL at 05:10

## 2018-02-14 RX ADMIN — FOLIC ACID 1 MG: 1 TABLET ORAL at 08:01

## 2018-02-14 RX ADMIN — CHLORTHALIDONE 25 MG: 25 TABLET ORAL at 17:17

## 2018-02-14 RX ADMIN — TORSEMIDE 20 MG: 20 TABLET ORAL at 13:23

## 2018-02-14 RX ADMIN — ASPIRIN 81 MG 162 MG: 81 TABLET ORAL at 08:15

## 2018-02-14 RX ADMIN — INSULIN GLARGINE 6 UNITS: 100 INJECTION, SOLUTION SUBCUTANEOUS at 21:34

## 2018-02-14 RX ADMIN — LABETALOL HYDROCHLORIDE 200 MG: 100 TABLET, FILM COATED ORAL at 05:10

## 2018-02-14 RX ADMIN — CLOPIDOGREL BISULFATE 75 MG: 75 TABLET ORAL at 08:15

## 2018-02-14 RX ADMIN — INSULIN GLARGINE 7 UNITS: 100 INJECTION, SOLUTION SUBCUTANEOUS at 07:59

## 2018-02-14 RX ADMIN — HYDRALAZINE HYDROCHLORIDE 100 MG: 25 TABLET, FILM COATED ORAL at 21:34

## 2018-02-14 RX ADMIN — INSULIN LISPRO 4 UNITS: 100 INJECTION, SOLUTION INTRAVENOUS; SUBCUTANEOUS at 16:51

## 2018-02-14 RX ADMIN — INSULIN LISPRO 4 UNITS: 100 INJECTION, SOLUTION INTRAVENOUS; SUBCUTANEOUS at 11:15

## 2018-02-14 RX ADMIN — CLONIDINE HYDROCHLORIDE 0.2 MG: 0.1 TABLET ORAL at 14:27

## 2018-02-14 RX ADMIN — ESCITALOPRAM OXALATE 10 MG: 10 TABLET ORAL at 08:01

## 2018-02-14 RX ADMIN — HEPARIN SODIUM 5000 UNITS: 5000 INJECTION, SOLUTION INTRAVENOUS; SUBCUTANEOUS at 13:23

## 2018-02-14 NOTE — ASSESSMENT & PLAN NOTE
Monitor H andH/  Repeat H and H showed a hemoglobin of 7 2  Baseline hemoglobin appears to be around 8 and patient is asymptomatic    Monitor H and H  Transfuse if hemoglobin less than 7  Consent obtained and in the chart just in case if the patient needs transfusion

## 2018-02-14 NOTE — PLAN OF CARE
Problem: DISCHARGE PLANNING - CARE MANAGEMENT  Goal: Discharge to post-acute care or home with appropriate resources  INTERVENTIONS:  - Conduct assessment to determine patient/family and health care team treatment goals, and need for post-acute services based on payer coverage, community resources, and patient preferences, and barriers to discharge  - Address psychosocial, clinical, and financial barriers to discharge as identified in assessment in conjunction with the patient/family and health care team  - Arrange appropriate level of post-acute services according to patients   needs and preference and payer coverage in collaboration with the physician and health care team  - Communicate with and update the patient/family, physician, and health care team regarding progress on the discharge plan  - Arrange appropriate transportation to post-acute venues  Outcome: Progressing  Cm met w pt and his mother, Alanna Lawrence, [phone]  Pt lives with mother and has support at home upon d c ind w adls  No dme  Employed  Uses [employer] for RXs  Has ride home at d c  Pt has already been going outpatient for PT/OT  No other reported needs at this time  Readmission     CM reviewed discharge planning process including the following: identifying help at home, patient preference for discharge planning needs, pharmacy preference, and availability of treatment team to discuss questions or concerns patient and/or family may have regarding understanding medications and recognizing signs and symptoms once discharged  CM also encouraged patient to follow up with all recommended appointments after discharge  Patient advised of importance for patient and family to participate in managing patients medical well being  CM name and role reviewed and Discharge Checklist provided  Encouraged patient and caregiver to review prior to discharge

## 2018-02-14 NOTE — SOCIAL WORK
Cm met w pt and his mother, Issa Otto, [phone]  Pt lives with mother and has support at home upon d c ind w adls  No dme  Employed  Uses [employer] for RXs  Has ride home at d c  Pt has already been going outpatient for PT/OT  No other reported needs at this time  Readmission     CM reviewed discharge planning process including the following: identifying help at home, patient preference for discharge planning needs, pharmacy preference, and availability of treatment team to discuss questions or concerns patient and/or family may have regarding understanding medications and recognizing signs and symptoms once discharged  CM also encouraged patient to follow up with all recommended appointments after discharge  Patient advised of importance for patient and family to participate in managing patients medical well being  CM name and role reviewed and Discharge Checklist provided  Encouraged patient and caregiver to review prior to discharge

## 2018-02-14 NOTE — PLAN OF CARE
Problem: PAIN - ADULT  Goal: Verbalizes/displays adequate comfort level or baseline comfort level  Interventions:  - Encourage patient to monitor pain and request assistance  - Assess pain using appropriate pain scale  - Administer analgesics based on type and severity of pain and evaluate response  - Implement non-pharmacological measures as appropriate and evaluate response  - Consider cultural and social influences on pain and pain management  - Notify physician/advanced practitioner if interventions unsuccessful or patient reports new pain   Outcome: Progressing      Problem: INFECTION - ADULT  Goal: Absence or prevention of progression during hospitalization  INTERVENTIONS:  - Assess and monitor for signs and symptoms of infection  - Monitor lab/diagnostic results  - Monitor all insertion sites, i e  indwelling lines, tubes, and drains  - Monitor endotracheal (as able) and nasal secretions for changes in amount and color  - [place] appropriate cooling/warming therapies per order  - Administer medications as ordered  - Instruct and encourage patient and family to use good hand hygiene technique  - Identify and instruct in appropriate isolation precautions for identified infection/condition   Outcome: Progressing    Goal: Absence of fever/infection during neutropenic period  INTERVENTIONS:  - Monitor WBC  - Implement neutropenic guidelines   Outcome: Progressing      Problem: SAFETY ADULT  Goal: Patient will remain free of falls  INTERVENTIONS:  - Assess patient frequently for physical needs  -  Identify cognitive and physical deficits and behaviors that affect risk of falls    -  [place] fall precautions as indicated by assessment   - Educate patient/family on patient safety including physical limitations  - Instruct patient to call for assistance with activity based on assessment  - Modify environment to reduce risk of injury  - Consider OT/PT consult to assist with strengthening/mobility    Outcome: Progressing    Goal: Maintain or return to baseline ADL function  INTERVENTIONS:  -  Assess patient's ability to carry out ADLs; assess patient's baseline for ADL function and identify physical deficits which impact ability to perform ADLs (bathing, care of mouth/teeth, toileting, grooming, dressing, etc )  - Assess/evaluate cause of self-care deficits   - Assess range of motion  - Assess patient's mobility; develop plan if impaired  - Assess patient's need for assistive devices and provide as appropriate  - Encourage maximum independence but intervene and supervise when necessary  ¯ Involve family in performance of ADLs  ¯ Assess for home care needs following discharge   ¯ Request OT consult to assist with ADL evaluation and planning for discharge  ¯ Provide patient education as appropriate   Outcome: Progressing    Goal: Maintain or return mobility status to optimal level  INTERVENTIONS:  - Assess patient's baseline mobility status (ambulation, transfers, stairs, etc )    - Identify cognitive and physical deficits and behaviors that affect mobility  - Identify mobility aids required to assist with transfers and/or ambulation (gait belt, sit-to-stand, lift, walker, cane, etc )  - [place] fall precautions as indicated by assessment  - Record patient progress and toleration of activity level on Mobility SBAR; progress patient to next Phase/Stage  - Instruct patient to call for assistance with activity based on assessment  - Request Rehabilitation consult to assist with strengthening/weightbearing, etc    Outcome: Progressing      Problem: DISCHARGE PLANNING  Goal: Discharge to home or other facility with appropriate resources  INTERVENTIONS:  - Identify barriers to discharge w/patient and caregiver  - Arrange for needed discharge resources and transportation as appropriate  - Identify discharge learning needs (meds, wound care, etc )  - Arrange for interpretive services to assist at discharge as needed  - Refer to Case Management Department for coordinating discharge planning if the patient needs post-hospital services based on physician/advanced practitioner order or complex needs related to functional status, cognitive ability, or social support system   Outcome: Progressing      Problem: Knowledge Deficit  Goal: Patient/family/caregiver demonstrates understanding of disease process, treatment plan, medications, and discharge instructions  Complete learning assessment and assess knowledge base  Interventions:  - Provide teaching at level of understanding  - Provide teaching via preferred learning methods   Outcome: Progressing      Problem: CARDIOVASCULAR - ADULT  Goal: Maintains optimal cardiac output and hemodynamic stability  INTERVENTIONS:  - Monitor I/O, vital signs and rhythm  - Monitor for S/S and trends of decreased cardiac output i e  bleeding, hypotension  - Administer and titrate ordered vasoactive medications to optimize hemodynamic stability  - Assess quality of pulses, skin color and temperature  - Assess for signs of decreased coronary artery perfusion - ex   Angina  - Instruct patient to report change in severity of symptoms   Outcome: Progressing    Goal: Absence of cardiac dysrhythmias or at baseline rhythm  INTERVENTIONS:  - Continuous cardiac monitoring, monitor vital signs, obtain 12 lead EKG if indicated  - Administer antiarrhythmic and heart rate control medications as ordered  - Monitor electrolytes and administer replacement therapy as ordered   Outcome: Progressing      Problem: METABOLIC, FLUID AND ELECTROLYTES - ADULT  Goal: Electrolytes maintained within normal limits  INTERVENTIONS:  - Monitor labs and assess patient for signs and symptoms of electrolyte imbalances  - Administer electrolyte replacement as ordered  - Monitor response to electrolyte replacements, including repeat lab results as appropriate  - Instruct patient on fluid and nutrition as appropriate   Outcome: Progressing    Goal: Fluid balance maintained  INTERVENTIONS:  - Monitor labs and assess for signs and symptoms of volume excess or deficit  - Monitor I/O and WT  - Instruct patient on fluid and nutrition as appropriate   Outcome: Progressing    Goal: Glucose maintained within target range  INTERVENTIONS:  - Monitor Blood Glucose as ordered  - Assess for signs and symptoms of hyperglycemia and hypoglycemia  - Administer ordered medications to maintain glucose within target range  - Assess nutritional intake and initiate nutrition service referral as needed   Outcome: Progressing      Problem: Potential for Falls  Goal: Patient will remain free of falls  INTERVENTIONS:  - Assess patient frequently for physical needs  -  Identify cognitive and physical deficits and behaviors that affect risk of falls    -  [place] fall precautions as indicated by assessment   - Educate patient/family on patient safety including physical limitations  - Instruct patient to call for assistance with activity based on assessment  - Modify environment to reduce risk of injury  - Consider OT/PT consult to assist with strengthening/mobility    Outcome: Progressing

## 2018-02-14 NOTE — ASSESSMENT & PLAN NOTE
Creatinine is 3 65  Patient with good urine output  Possible new baseline  Trend creatinine  Nephrology on board

## 2018-02-14 NOTE — ASSESSMENT & PLAN NOTE
Blood pressure was stable over night and in the morning    Started have elevated blood pressure to worse the evening Nephrology adjust that the medication  Monitor for now

## 2018-02-14 NOTE — ASSESSMENT & PLAN NOTE
Known history of type 1 diabetes  Currently on insulin-Lantus and sliding scale  Monitor blood sugar  Adjusted the dose of Lantus yesterday    Monitor blood sugar

## 2018-02-14 NOTE — PROGRESS NOTES
Progress Note Gayle Proper 1985, 27 y o  male MRN: [id number]    Unit/Bed#: -01 Encounter: [id number]    Primary Care Provider: Maddy De La Rosa DO   Date and time admitted to hospital: 2/10/2018  5:21 PM        Chronic kidney disease, stage 4 (severe) ([facility] )   Assessment & Plan    Creatinine is 3 65  Patient with good urine output  Possible new baseline  Trend creatinine  Nephrology on board        Anemia in stage 3 chronic kidney disease   Assessment & Plan    Monitor H andH/  Repeat H and H showed a hemoglobin of 7 2  Baseline hemoglobin appears to be around 8 and patient is asymptomatic  Monitor H and H  Transfuse if hemoglobin less than 7  Consent obtained and in the chart just in case if the patient needs transfusion        History of lacunar cerebrovascular accident (CVA)   Assessment & Plan    Continue with the current care, no deficits noted on exam        Type 1 diabetes mellitus with diabetic nephropathy, with long-term current use of insulin ([facility] )   Assessment & Plan    Known history of type 1 diabetes  Currently on insulin-Lantus and sliding scale  Monitor blood sugar  Adjusted the dose of Lantus yesterday  Monitor blood sugar        * Hypertensive urgency   Assessment & Plan    Blood pressure was stable over night and in the morning  Started have elevated blood pressure to worse the evening Nephrology adjust that the medication  Monitor for now            VTE Pharmacologic Prophylaxis:   Pharmacologic: Heparin  Mechanical VTE Prophylaxis in Place: Yes    Patient Centered Rounds: I have performed bedside rounds with nursing staff today  Discussions with Specialists or Other Care Team Provider:     Education and Discussions with Family / Patient:  Patient only    Time Spent for Care: 30 minutes  More than 50% of total time spent on counseling and coordination of care as described above      Current Length of Stay: 4 day(s)    Current Patient Status: Inpatient   Certification Statement: The patient will continue to require additional inpatient hospital stay due to Hypertensive urgency and anemia    Discharge Plan:     Code Status: Level 1 - Full Code      Subjective:   Patient seen and examined  Patient reported that he had bleeding from his nose when he was blowing nose dialysis today  Only specks of blood no active bleeding  No dark stools    Objective:     Vitals:   Temp (24hrs), Av 6 °F (37 °C), Min:98 5 °F (36 9 °C), Max:98 9 °F (37 2 °C)    HR:  [74-89] 74  Resp:  [18] 18  BP: (142-231)/() 162/78  SpO2:  [96 %-99 %] 99 %  Body mass index is 31 33 kg/m²  Input and Output Summary (last 24 hours): Intake/Output Summary (Last 24 hours) at 18 1718  Last data filed at 18 1550   Gross per 24 hour   Intake             1500 ml   Output             2100 ml   Net             -600 ml       Physical Exam:     Physical Exam   Constitutional: He appears well-developed and well-nourished  HENT:   Head: Normocephalic and atraumatic  Eyes: Pupils are equal, round, and reactive to light  Neck: Normal range of motion  Neck supple  No thyromegaly present  Cardiovascular: Normal rate and regular rhythm  Pulmonary/Chest: Effort normal    Abdominal: Soft  Bowel sounds are normal  He exhibits no distension  Musculoskeletal: He exhibits edema  Neurological: He is alert  No cranial nerve deficit  Skin: Skin is warm and dry         Additional Data:     Labs:      Results from last 7 days  Lab Units 18  0839 18  0619   WBC Thousand/uL  --  5 82   HEMOGLOBIN g/dL 7 2* 6 9*   HEMATOCRIT % 21 3* 20 3*   PLATELETS Thousands/uL  --  178   NEUTROS PCT %  --  65   LYMPHS PCT %  --  24   MONOS PCT %  --  6   EOS PCT %  --  4       Results from last 7 days  Lab Units 18  0619   SODIUM mmol/L 141   POTASSIUM mmol/L 5 2   CHLORIDE mmol/L 110*   CO2 mmol/L 20*   BUN mg/dL 34*   CREATININE mg/dL 3 65*   CALCIUM mg/dL 8 5   TOTAL PROTEIN g/dL 5 1* BILIRUBIN TOTAL mg/dL 0 40   ALK PHOS U/L 68   ALT U/L 17   AST U/L 15   GLUCOSE RANDOM mg/dL 178*       Results from last 7 days  Lab Units 02/12/18  0458   INR  0 90       * I Have Reviewed All Lab Data Listed Above  * Additional Pertinent Lab Tests Reviewed:  Ellen Cueto Admission Reviewed    Imaging:    Imaging Reports Reviewed Today Include:   Imaging Personally Reviewed by Myself Includes:      Recent Cultures (last 7 days):           Last 24 Hours Medication List:     Current Facility-Administered Medications:  aspirin 162 mg Oral Daily Sindi Vergara PA-C   atorvastatin 40 mg Oral QPM Sindi Vergara PA-C   chlorthalidone 25 mg Oral Daily Seble Pratt MD   clopidogrel 75 mg Oral Daily Sindi Vergara PA-C   [START ON 2/16/2018] ergocalciferol 50,000 Units Oral Weekly Erika Green PA-C   escitalopram 10 mg Oral Daily Sindi Vergara PA-C   folic acid 1 mg Oral Daily Sindi Vergara PA-C   heparin (porcine) 5,000 Units Subcutaneous Q8H [address] Sindi Vergara PA-C   hydrALAZINE 10 mg Intravenous Q6H PRN Wilma Pennington PA-C   hydrALAZINE 100 mg Oral Q8H [address] Seble Pratt MD   insulin glargine 6 Units Subcutaneous HS Sindi Vergara PA-C   insulin glargine 7 Units Subcutaneous Daily With Breakfast Olegario Reyes MD   insulin lispro 1-5 Units Subcutaneous HS Sindi Vergara PA-C   insulin lispro 1-6 Units Subcutaneous TID AC Sindi Vergara PA-C   insulin lispro 4 Units Subcutaneous Daily With Breakfast Sindi Vergara PA-C   insulin lispro 4 Units Subcutaneous Daily With Lunch Sindi Vergara PA-C   insulin lispro 4 Units Subcutaneous Daily With Dinner Sindi Vergara PA-C   labetalol 10 mg Intravenous Q6H PRN DEISI Hancock   labetalol 200 mg Oral Q8H [address] Seble Pratt MD   NIFEdipine ER 60 mg Oral BID (diuretic) Tho Jamil MD   torsemide 20 mg Oral Once Seble Pratt MD   torsemide 20 mg Oral Daily DEISI Nelson Today, Patient Was Seen By: Mortimer Leber, MD    ** Please Note: Dictation voice to text software may have been used in the creation of this document   **

## 2018-02-15 LAB
ABO GROUP BLD: NORMAL
ALBUMIN SERPL BCP-MCNC: 2.7 G/DL (ref 3.5–5)
ALDOST SERPL-MCNC: <1 NG/DL (ref 0–30)
ALP SERPL-CCNC: 73 U/L (ref 46–116)
ALT SERPL W P-5'-P-CCNC: 34 U/L (ref 12–78)
ANION GAP SERPL CALCULATED.3IONS-SCNC: 12 MMOL/L (ref 4–13)
AST SERPL W P-5'-P-CCNC: 41 U/L (ref 5–45)
BASOPHILS # BLD AUTO: 0.05 THOUSANDS/ΜL (ref 0–0.1)
BASOPHILS NFR BLD AUTO: 1 % (ref 0–1)
BILIRUB SERPL-MCNC: 0.3 MG/DL (ref 0.2–1)
BLD GP AB SCN SERPL QL: NEGATIVE
BUN SERPL-MCNC: 33 MG/DL (ref 5–25)
CALCIUM SERPL-MCNC: 8.5 MG/DL (ref 8.3–10.1)
CHLORIDE SERPL-SCNC: 109 MMOL/L (ref 100–108)
CO2 SERPL-SCNC: 20 MMOL/L (ref 21–32)
CREAT SERPL-MCNC: 3.8 MG/DL (ref 0.6–1.3)
EOSINOPHIL # BLD AUTO: 0.21 THOUSAND/ΜL (ref 0–0.61)
EOSINOPHIL NFR BLD AUTO: 4 % (ref 0–6)
ERYTHROCYTE [DISTWIDTH] IN BLOOD BY AUTOMATED COUNT: 13.5 % (ref 11.6–15.1)
FERRITIN SERPL-MCNC: 188 NG/ML (ref 8–388)
FOLATE SERPL-MCNC: >20 NG/ML (ref 3.1–17.5)
GFR SERPL CREATININE-BSD FRML MDRD: 19 ML/MIN/1.73SQ M
GLUCOSE SERPL-MCNC: 131 MG/DL (ref 65–140)
GLUCOSE SERPL-MCNC: 142 MG/DL (ref 65–140)
GLUCOSE SERPL-MCNC: 228 MG/DL (ref 65–140)
GLUCOSE SERPL-MCNC: 231 MG/DL (ref 65–140)
GLUCOSE SERPL-MCNC: 258 MG/DL (ref 65–140)
HCT VFR BLD AUTO: 21 % (ref 36.5–49.3)
HCT VFR BLD AUTO: 24.6 % (ref 36.5–49.3)
HGB BLD-MCNC: 6.9 G/DL (ref 12–17)
HGB BLD-MCNC: 8.2 G/DL (ref 12–17)
IRON SATN MFR SERPL: 31 %
IRON SERPL-MCNC: 60 UG/DL (ref 65–175)
LYMPHOCYTES # BLD AUTO: 1.61 THOUSANDS/ΜL (ref 0.6–4.47)
LYMPHOCYTES NFR BLD AUTO: 33 % (ref 14–44)
MCH RBC QN AUTO: 30.8 PG (ref 26.8–34.3)
MCHC RBC AUTO-ENTMCNC: 32.9 G/DL (ref 31.4–37.4)
MCV RBC AUTO: 94 FL (ref 82–98)
MONOCYTES # BLD AUTO: 0.39 THOUSAND/ΜL (ref 0.17–1.22)
MONOCYTES NFR BLD AUTO: 8 % (ref 4–12)
NEUTROPHILS # BLD AUTO: 2.64 THOUSANDS/ΜL (ref 1.85–7.62)
NEUTS SEG NFR BLD AUTO: 54 % (ref 43–75)
PLATELET # BLD AUTO: 183 THOUSANDS/UL (ref 149–390)
PMV BLD AUTO: 10.7 FL (ref 8.9–12.7)
POTASSIUM SERPL-SCNC: 4.9 MMOL/L (ref 3.5–5.3)
PROT SERPL-MCNC: 5.4 G/DL (ref 6.4–8.2)
RBC # BLD AUTO: 2.24 MILLION/UL (ref 3.88–5.62)
RENIN PLAS-CCNC: 0.29 NG/ML/HR (ref 0.17–5.38)
RH BLD: POSITIVE
SODIUM SERPL-SCNC: 141 MMOL/L (ref 136–145)
SPECIMEN EXPIRATION DATE: NORMAL
TIBC SERPL-MCNC: 191 UG/DL (ref 250–450)
VIT B12 SERPL-MCNC: 535 PG/ML (ref 100–900)
WBC # BLD AUTO: 4.9 THOUSAND/UL (ref 4.31–10.16)

## 2018-02-15 PROCEDURE — 85014 HEMATOCRIT: CPT | Performed by: FAMILY MEDICINE

## 2018-02-15 PROCEDURE — P9021 RED BLOOD CELLS UNIT: HCPCS

## 2018-02-15 PROCEDURE — 85018 HEMOGLOBIN: CPT | Performed by: FAMILY MEDICINE

## 2018-02-15 PROCEDURE — 86901 BLOOD TYPING SEROLOGIC RH(D): CPT | Performed by: FAMILY MEDICINE

## 2018-02-15 PROCEDURE — 82728 ASSAY OF FERRITIN: CPT | Performed by: FAMILY MEDICINE

## 2018-02-15 PROCEDURE — 83540 ASSAY OF IRON: CPT | Performed by: FAMILY MEDICINE

## 2018-02-15 PROCEDURE — 83550 IRON BINDING TEST: CPT | Performed by: FAMILY MEDICINE

## 2018-02-15 PROCEDURE — 85025 COMPLETE CBC W/AUTO DIFF WBC: CPT | Performed by: FAMILY MEDICINE

## 2018-02-15 PROCEDURE — 99232 SBSQ HOSP IP/OBS MODERATE 35: CPT | Performed by: FAMILY MEDICINE

## 2018-02-15 PROCEDURE — 82948 REAGENT STRIP/BLOOD GLUCOSE: CPT

## 2018-02-15 PROCEDURE — 82746 ASSAY OF FOLIC ACID SERUM: CPT | Performed by: FAMILY MEDICINE

## 2018-02-15 PROCEDURE — 80053 COMPREHEN METABOLIC PANEL: CPT | Performed by: FAMILY MEDICINE

## 2018-02-15 PROCEDURE — 99233 SBSQ HOSP IP/OBS HIGH 50: CPT | Performed by: INTERNAL MEDICINE

## 2018-02-15 PROCEDURE — 86850 RBC ANTIBODY SCREEN: CPT | Performed by: FAMILY MEDICINE

## 2018-02-15 PROCEDURE — 86900 BLOOD TYPING SEROLOGIC ABO: CPT | Performed by: FAMILY MEDICINE

## 2018-02-15 PROCEDURE — 86923 COMPATIBILITY TEST ELECTRIC: CPT

## 2018-02-15 PROCEDURE — 30233N1 TRANSFUSION OF NONAUTOLOGOUS RED BLOOD CELLS INTO PERIPHERAL VEIN, PERCUTANEOUS APPROACH: ICD-10-PCS | Performed by: FAMILY MEDICINE

## 2018-02-15 PROCEDURE — 82607 VITAMIN B-12: CPT | Performed by: FAMILY MEDICINE

## 2018-02-15 RX ADMIN — NIFEDIPINE 60 MG: 30 TABLET, FILM COATED, EXTENDED RELEASE ORAL at 17:46

## 2018-02-15 RX ADMIN — TORSEMIDE 20 MG: 20 TABLET ORAL at 09:05

## 2018-02-15 RX ADMIN — FOLIC ACID 1 MG: 1 TABLET ORAL at 09:04

## 2018-02-15 RX ADMIN — INSULIN GLARGINE 6 UNITS: 100 INJECTION, SOLUTION SUBCUTANEOUS at 21:04

## 2018-02-15 RX ADMIN — CLOPIDOGREL BISULFATE 75 MG: 75 TABLET ORAL at 09:04

## 2018-02-15 RX ADMIN — INSULIN LISPRO 2 UNITS: 100 INJECTION, SOLUTION INTRAVENOUS; SUBCUTANEOUS at 21:05

## 2018-02-15 RX ADMIN — INSULIN LISPRO 3 UNITS: 100 INJECTION, SOLUTION INTRAVENOUS; SUBCUTANEOUS at 16:59

## 2018-02-15 RX ADMIN — HYDRALAZINE HYDROCHLORIDE 100 MG: 25 TABLET, FILM COATED ORAL at 05:21

## 2018-02-15 RX ADMIN — HEPARIN SODIUM 5000 UNITS: 5000 INJECTION, SOLUTION INTRAVENOUS; SUBCUTANEOUS at 15:07

## 2018-02-15 RX ADMIN — ASPIRIN 81 MG 162 MG: 81 TABLET ORAL at 09:04

## 2018-02-15 RX ADMIN — CHLORTHALIDONE 25 MG: 25 TABLET ORAL at 09:05

## 2018-02-15 RX ADMIN — ATORVASTATIN CALCIUM 40 MG: 40 TABLET, FILM COATED ORAL at 17:46

## 2018-02-15 RX ADMIN — NIFEDIPINE 60 MG: 30 TABLET, FILM COATED, EXTENDED RELEASE ORAL at 09:04

## 2018-02-15 RX ADMIN — HEPARIN SODIUM 5000 UNITS: 5000 INJECTION, SOLUTION INTRAVENOUS; SUBCUTANEOUS at 21:04

## 2018-02-15 RX ADMIN — LABETALOL 20 MG/4 ML (5 MG/ML) INTRAVENOUS SYRINGE 10 MG: at 22:28

## 2018-02-15 RX ADMIN — LABETALOL HYDROCHLORIDE 200 MG: 100 TABLET, FILM COATED ORAL at 15:09

## 2018-02-15 RX ADMIN — INSULIN LISPRO 4 UNITS: 100 INJECTION, SOLUTION INTRAVENOUS; SUBCUTANEOUS at 09:05

## 2018-02-15 RX ADMIN — INSULIN LISPRO 4 UNITS: 100 INJECTION, SOLUTION INTRAVENOUS; SUBCUTANEOUS at 16:59

## 2018-02-15 RX ADMIN — LABETALOL HYDROCHLORIDE 200 MG: 100 TABLET, FILM COATED ORAL at 21:04

## 2018-02-15 RX ADMIN — LABETALOL HYDROCHLORIDE 200 MG: 100 TABLET, FILM COATED ORAL at 05:21

## 2018-02-15 RX ADMIN — INSULIN GLARGINE 7 UNITS: 100 INJECTION, SOLUTION SUBCUTANEOUS at 09:05

## 2018-02-15 RX ADMIN — INSULIN LISPRO 3 UNITS: 100 INJECTION, SOLUTION INTRAVENOUS; SUBCUTANEOUS at 11:59

## 2018-02-15 RX ADMIN — HEPARIN SODIUM 5000 UNITS: 5000 INJECTION, SOLUTION INTRAVENOUS; SUBCUTANEOUS at 05:21

## 2018-02-15 RX ADMIN — ESCITALOPRAM OXALATE 10 MG: 10 TABLET ORAL at 09:04

## 2018-02-15 RX ADMIN — HYDRALAZINE HYDROCHLORIDE 100 MG: 25 TABLET, FILM COATED ORAL at 21:03

## 2018-02-15 RX ADMIN — INSULIN LISPRO 4 UNITS: 100 INJECTION, SOLUTION INTRAVENOUS; SUBCUTANEOUS at 11:59

## 2018-02-15 RX ADMIN — HYDRALAZINE HYDROCHLORIDE 100 MG: 25 TABLET, FILM COATED ORAL at 15:07

## 2018-02-15 NOTE — PLAN OF CARE
CARDIOVASCULAR - ADULT     Maintains optimal cardiac output and hemodynamic stability Progressing     Absence of cardiac dysrhythmias or at baseline rhythm Progressing        DISCHARGE PLANNING     Discharge to home or other facility with appropriate resources Progressing        DISCHARGE PLANNING - CARE MANAGEMENT     Discharge to post-acute care or home with appropriate resources Progressing        Knowledge Deficit     Patient demonstrates understanding of disease process, treatment plan, medications, and discharge instructions Progressing        METABOLIC, FLUID AND ELECTROLYTES - ADULT     Electrolytes maintained within normal limits Progressing     Fluid balance maintained Progressing     Glucose maintained within target range Progressing        PAIN - ADULT     Verbalizes adequate comfort level or baseline comfort level Progressing        Potential for Falls     Patient will remain free of falls Progressing        SAFETY ADULT     Patient will remain free of falls Progressing     Maintain or return to baseline ADL function Progressing     Maintain or return mobility status to optimal level Progressing

## 2018-02-15 NOTE — PROGRESS NOTES
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   Due to concerns for for demyelinating disorder patient underwent a course of plasmapheresis urgency -discharged on 02/05/2018       1  Acute kidney injury:  Resolving, nonoliguric   Felt to be likely related to accelerated hypertension, and episode of recent LLUVIA  · Creatinine has increased slightly to 3 80 mg/dL probably in part related to antihypertensive regimen/need to keep his blood pressure improved and the diuretic  We may have to accept a higher creatinine in the low 4s to keep his blood pressure/edema/volume under control  2  Chronic kidney disease stage 4:  Baseline creatinine likely near 3 3-3 5  CKD likely due to diabetic nephropathy  This may be higher baseline is outlined above  He will require close outpatient follow-up  3  Nephrotic range proteinuria:  Microalbumin creatinine ratio on 01/23/2018 9281 mg/g   Suspected diabetic nephropathy -previous serologic workup negative  4  Hypertensive urgency:  Currently on labetalol 200 mg t i d , hydralazine 100 mg every 8 hours, nifedipine 60 mg twice a day  The patient's blood pressures improved overall  He does get an occasional spike and he had several yesterday  Overall however the course has improved nicely  I would allow the current regimen to take effect but with extremely close outpatient follow-up along with the inpatient follow-up and adjustments as needed    · Renal artery Doppler evaluation-no evidence of renovascular disease on the right, unable to visualize left renal artery due to excessive bowel gas  · Aldosterone unremarkable  · Metanephrines pending  · TSH normal  · Torsemide was just added yesterday; for now continue chlorthalidone for extra 8 in hyperkalemic problem  · NO ACEi or ARB due to renal insufficiency/hyperkalemia  5  Hyperkalemia:  Resolved   Potassium remains high normal    Secondary to renal failure, dietary discretion, type 4 RTA  · Combo diuretics, in addition low-potassium diet  ? Need for Lorayne Tabitha going forward  6  Anemia:    · Transfuse for hemoglobin less than 7   Patient will receive a unit of packed red blood cells today per primary service  · No HETAL due to recent CVA  · Iron saturation 28%, ferritin level 222  7  Recent CVA left pontine, history of lacunar CVA  8  Hyperhomocystinemia:  Hematology follow-up  9  CKD MBD:  Phosphorus mildly elevated 4 8 on 02/12/2018   On renal diet   Continue to monitor  Subjective:   Patient overall feeling better  No chest pain or shortness of breath  No nausea vomiting or diarrhea  No headache  Urinating well  Objective:     Vitals: Blood pressure 133/65, pulse 77, temperature 98 8 °F (37 1 °C), temperature source Oral, resp  rate 18, height 5' 11" (1 803 m), weight 101 kg (223 lb 1 7 oz), SpO2 98 %  ,Body mass index is 31 12 kg/m²      Weight (last 2 days)     Date/Time   Weight    02/15/18 0500  101 (223 11)    02/14/18 0600  102 (224 65)    02/13/18 1350  105 (231 48)    02/13/18 0543  105 (231 48)                Intake/Output Summary (Last 24 hours) at 02/15/18 0941  Last data filed at 02/15/18 0500   Gross per 24 hour   Intake             1380 ml   Output             2100 ml   Net             -720 ml            Physical Exam: General:  No acute distress  Skin:  No acute rash  Eyes:  No scleral icterus  ENT:  Moist mucous membranes  Neck:  Supple, no jugular venous distention  Chest:  Clear to auscultation percussion  CVS:  No rubs or gallops appreciated  Abdomen:  Soft and nontender with normal bowel sounds  Extremities:  1+ lower extremity edema bilaterally  Neuro:  Grossly intact  Psych:  Alert and oriented                Medications:    Scheduled Meds:  Current Facility-Administered Medications:  aspirin 162 mg Oral Daily Sindi Vergara PA-C   atorvastatin 40 mg Oral QPM Sindi Vergara PA-C   chlorthalidone 25 mg Oral Daily Kimberly Lovell MD   clopidogrel 75 mg Oral Daily Sindi Vergara PA-C   [START ON 2/16/2018] ergocalciferol 50,000 Units Oral Weekly Erika Green PA-C   escitalopram 10 mg Oral Daily Sindi Vergara PA-C   folic acid 1 mg Oral Daily Sindi Vergara PA-C   heparin (porcine) 5,000 Units Subcutaneous Q8H [address] Sindi Vergara PA-C   hydrALAZINE 10 mg Intravenous Q6H PRN Wilma Pennington PA-C   hydrALAZINE 100 mg Oral Q8H [address] Kimberly Lovell MD   insulin glargine 6 Units Subcutaneous HS Sindi Vergara PA-C   insulin glargine 7 Units Subcutaneous Daily With Breakfast Janice Everett MD   insulin lispro 1-5 Units Subcutaneous HS Sindi Vergara PA-C   insulin lispro 1-6 Units Subcutaneous TID AC Sindi Vergara PA-C   insulin lispro 4 Units Subcutaneous Daily With Breakfast Sindi Vergara PA-C   insulin lispro 4 Units Subcutaneous Daily With Lunch Sindi Vergara PA-C   insulin lispro 4 Units Subcutaneous Daily With Dinner Sindi Vergara PA-C   labetalol 10 mg Intravenous Q6H PRN DEISI Hancock   labetalol 200 mg Oral Q8H [address] Kimberly Lovell MD   NIFEdipine ER 60 mg Oral BID (diuretic) Thu Mcarthur MD   torsemide 20 mg Oral Once Kimberly Lovell MD   torsemide 20 mg Oral Daily DEISI Buckley       PRN Meds: hydrALAZINE    labetalol    Continuous Infusions:     Lab, Imaging and other studies: I have personally reviewed pertinent labs    Laboratory Results:    Results from last 7 days  Lab Units 02/15/18  0527 02/14/18  9633 02/14/18  3347 02/13/18  0450 02/12/18  0453 02/12/18  0005 02/11/18  1616 02/11/18  0841 02/11/18  0254 02/10/18  2208  02/10/18  1744   WBC Thousand/uL 4 90  --  5 82  --  9 23  --   --   --  9 49  --   --  10 89*   HEMOGLOBIN g/dL 6 9* 7 2* 6 9*  --  7 9*  --   --   --  7 7*  --   --  8 7*   HEMATOCRIT % 21 0* 21 3* 20 3*  --  23 7*  --   --   --  23 4*  --   --  26 2*   PLATELETS Thousands/uL 183  --  178  --  199  --   --   --  189 185  --  261   SODIUM mmol/L 141  --  141 141 142 142 142 141 141 142  < >  --    POTASSIUM mmol/L 4 9  --  5 2 5 1 5 2 5 3 5 4* 5 8* 5 5* 5 8*  < >  --    CHLORIDE mmol/L 109*  --  110* 110* 111* 111* 110* 112* 111* 109*  < >  --    CO2 mmol/L 20*  --  20* 21 22 24 21 20* 22 21  < >  --    BUN mg/dL 33*  --  34* 35* 35* 36* 32* 34* 38* 37*  < >  --    CREATININE mg/dL 3 80*  --  3 65* 3 73* 3 68* 3 84* 3 82* 3 72* 3 90* 3 86*  < >  --    CALCIUM mg/dL 8 5  --  8 5 8 5 8 4 8 3 8 6 8 5 8 5 9 0  < >  --    MAGNESIUM mg/dL  --   --   --   --  1 8  --   --   --  1 9 2 0  --   --    PHOSPHORUS mg/dL  --   --   --   --  4 8*  --   --   --  4 4  --   --   --    TOTAL PROTEIN g/dL 5 4*  --  5 1*  --   --   --   --   --  5 4* 5 6*  --   --    GLUCOSE RANDOM mg/dL 131  --  178* 185* 91 107 188* 158* 153* 235*  < >  --    < > = values in this interval not displayed  Urinalysis: Lab Results   Component Value Date    COLORU Yellow 01/23/2018    COLORU Yellow 10/28/2015    CLARITYU Clear 01/23/2018    CLARITYU Cloudy 10/28/2015    SPECGRAV 1 020 01/23/2018    SPECGRAV 1 017 10/28/2015    PHUR 6 0 01/23/2018    PHUR 5 0 10/28/2015    LEUKOCYTESUR (A) 01/23/2018     Elevated glucose may cause decreased leukocyte values   See urine microscopic for [facility] result/    LEUKOCYTESUR Trace (A) 10/28/2015    NITRITE Negative 01/23/2018    NITRITE Negative 10/28/2015    PROTEINUA >=300 (A) 01/23/2018    PROTEINUA 300 (3+) (A) 10/28/2015    GLUCOSEU >=1000 (1%) (A) 01/23/2018    GLUCOSEU >=1000 (1%) (A) 10/28/2015    KETONESU Negative 01/23/2018 KETONESU Negative 10/28/2015    BILIRUBINUR Negative 01/23/2018    BILIRUBINUR INTERFERENCE (A) 10/28/2015    BLOODU Trace-Intact (A) 01/23/2018    BLOODU Negative 10/28/2015     ABGs: No results found for: [facility]  Radiology review:     Portions of the record may have been created with voice recognition software   Occasional wrong word or "sound a like" substitutions may have occurred due to the inherent limitations of voice recognition software   Read the chart carefully and recognize, using context, where substitutions have occurred

## 2018-02-16 LAB
ABO GROUP BLD BPU: NORMAL
ANION GAP SERPL CALCULATED.3IONS-SCNC: 10 MMOL/L (ref 4–13)
BASOPHILS # BLD AUTO: 0.04 THOUSANDS/ΜL (ref 0–0.1)
BASOPHILS NFR BLD AUTO: 1 % (ref 0–1)
BPU ID: NORMAL
BUN SERPL-MCNC: 34 MG/DL (ref 5–25)
CALCIUM SERPL-MCNC: 8.5 MG/DL (ref 8.3–10.1)
CHLORIDE SERPL-SCNC: 110 MMOL/L (ref 100–108)
CO2 SERPL-SCNC: 20 MMOL/L (ref 21–32)
CREAT SERPL-MCNC: 3.58 MG/DL (ref 0.6–1.3)
EOSINOPHIL # BLD AUTO: 0.23 THOUSAND/ΜL (ref 0–0.61)
EOSINOPHIL NFR BLD AUTO: 5 % (ref 0–6)
ERYTHROCYTE [DISTWIDTH] IN BLOOD BY AUTOMATED COUNT: 14.2 % (ref 11.6–15.1)
GFR SERPL CREATININE-BSD FRML MDRD: 21 ML/MIN/1.73SQ M
GLUCOSE SERPL-MCNC: 103 MG/DL (ref 65–140)
GLUCOSE SERPL-MCNC: 110 MG/DL (ref 65–140)
GLUCOSE SERPL-MCNC: 173 MG/DL (ref 65–140)
GLUCOSE SERPL-MCNC: 198 MG/DL (ref 65–140)
GLUCOSE SERPL-MCNC: 248 MG/DL (ref 65–140)
GLUCOSE SERPL-MCNC: 349 MG/DL (ref 65–140)
HCT VFR BLD AUTO: 24.1 % (ref 36.5–49.3)
HGB BLD-MCNC: 8.1 G/DL (ref 12–17)
LYMPHOCYTES # BLD AUTO: 1.09 THOUSANDS/ΜL (ref 0.6–4.47)
LYMPHOCYTES NFR BLD AUTO: 21 % (ref 14–44)
MAGNESIUM SERPL-MCNC: 1.9 MG/DL (ref 1.6–2.6)
MCH RBC QN AUTO: 30.9 PG (ref 26.8–34.3)
MCHC RBC AUTO-ENTMCNC: 33.6 G/DL (ref 31.4–37.4)
MCV RBC AUTO: 92 FL (ref 82–98)
METANEPH FREE SERPL-MCNC: 36 PG/ML (ref 0–62)
MONOCYTES # BLD AUTO: 0.36 THOUSAND/ΜL (ref 0.17–1.22)
MONOCYTES NFR BLD AUTO: 7 % (ref 4–12)
NEUTROPHILS # BLD AUTO: 3.38 THOUSANDS/ΜL (ref 1.85–7.62)
NEUTS SEG NFR BLD AUTO: 66 % (ref 43–75)
NORMETANEPHRINE SERPL-MCNC: 177 PG/ML (ref 0–145)
PLATELET # BLD AUTO: 177 THOUSANDS/UL (ref 149–390)
PMV BLD AUTO: 10.2 FL (ref 8.9–12.7)
POTASSIUM SERPL-SCNC: 4.9 MMOL/L (ref 3.5–5.3)
RBC # BLD AUTO: 2.62 MILLION/UL (ref 3.88–5.62)
SODIUM SERPL-SCNC: 140 MMOL/L (ref 136–145)
UNIT DISPENSE STATUS: NORMAL
UNIT PRODUCT CODE: NORMAL
UNIT RH: NORMAL
WBC # BLD AUTO: 5.1 THOUSAND/UL (ref 4.31–10.16)

## 2018-02-16 PROCEDURE — 99232 SBSQ HOSP IP/OBS MODERATE 35: CPT | Performed by: FAMILY MEDICINE

## 2018-02-16 PROCEDURE — 99233 SBSQ HOSP IP/OBS HIGH 50: CPT | Performed by: NURSE PRACTITIONER

## 2018-02-16 PROCEDURE — 80048 BASIC METABOLIC PNL TOTAL CA: CPT | Performed by: INTERNAL MEDICINE

## 2018-02-16 PROCEDURE — 85025 COMPLETE CBC W/AUTO DIFF WBC: CPT | Performed by: INTERNAL MEDICINE

## 2018-02-16 PROCEDURE — 83735 ASSAY OF MAGNESIUM: CPT | Performed by: INTERNAL MEDICINE

## 2018-02-16 PROCEDURE — 82948 REAGENT STRIP/BLOOD GLUCOSE: CPT

## 2018-02-16 RX ORDER — TORSEMIDE 20 MG/1
20 TABLET ORAL
Status: DISCONTINUED | OUTPATIENT
Start: 2018-02-16 | End: 2018-02-19 | Stop reason: HOSPADM

## 2018-02-16 RX ORDER — ACETAMINOPHEN 325 MG/1
650 TABLET ORAL EVERY 6 HOURS PRN
Status: DISCONTINUED | OUTPATIENT
Start: 2018-02-16 | End: 2018-02-19 | Stop reason: HOSPADM

## 2018-02-16 RX ORDER — OXYCODONE HYDROCHLORIDE 5 MG/1
5 TABLET ORAL ONCE
Status: COMPLETED | OUTPATIENT
Start: 2018-02-16 | End: 2018-02-16

## 2018-02-16 RX ADMIN — INSULIN LISPRO 4 UNITS: 100 INJECTION, SOLUTION INTRAVENOUS; SUBCUTANEOUS at 16:54

## 2018-02-16 RX ADMIN — ACETAMINOPHEN 650 MG: 325 TABLET, FILM COATED ORAL at 09:24

## 2018-02-16 RX ADMIN — INSULIN LISPRO 4 UNITS: 100 INJECTION, SOLUTION INTRAVENOUS; SUBCUTANEOUS at 12:25

## 2018-02-16 RX ADMIN — HYDRALAZINE HYDROCHLORIDE 100 MG: 25 TABLET, FILM COATED ORAL at 05:29

## 2018-02-16 RX ADMIN — TORSEMIDE 20 MG: 20 TABLET ORAL at 09:09

## 2018-02-16 RX ADMIN — ATORVASTATIN CALCIUM 40 MG: 40 TABLET, FILM COATED ORAL at 16:53

## 2018-02-16 RX ADMIN — LABETALOL 20 MG/4 ML (5 MG/ML) INTRAVENOUS SYRINGE 10 MG: at 04:35

## 2018-02-16 RX ADMIN — HYDRALAZINE HYDROCHLORIDE 100 MG: 25 TABLET, FILM COATED ORAL at 22:04

## 2018-02-16 RX ADMIN — ERGOCALCIFEROL 50000 UNITS: 1.25 CAPSULE ORAL at 09:10

## 2018-02-16 RX ADMIN — INSULIN LISPRO 5 UNITS: 100 INJECTION, SOLUTION INTRAVENOUS; SUBCUTANEOUS at 16:54

## 2018-02-16 RX ADMIN — LABETALOL HYDROCHLORIDE 200 MG: 100 TABLET, FILM COATED ORAL at 12:27

## 2018-02-16 RX ADMIN — ASPIRIN 81 MG 162 MG: 81 TABLET ORAL at 09:09

## 2018-02-16 RX ADMIN — HYDRALAZINE HYDROCHLORIDE 10 MG: 20 INJECTION INTRAMUSCULAR; INTRAVENOUS at 09:08

## 2018-02-16 RX ADMIN — LABETALOL HYDROCHLORIDE 200 MG: 100 TABLET, FILM COATED ORAL at 22:04

## 2018-02-16 RX ADMIN — HYDRALAZINE HYDROCHLORIDE 100 MG: 25 TABLET, FILM COATED ORAL at 12:28

## 2018-02-16 RX ADMIN — NIFEDIPINE 60 MG: 30 TABLET, FILM COATED, EXTENDED RELEASE ORAL at 09:09

## 2018-02-16 RX ADMIN — NIFEDIPINE 60 MG: 30 TABLET, FILM COATED, EXTENDED RELEASE ORAL at 16:53

## 2018-02-16 RX ADMIN — CLOPIDOGREL BISULFATE 75 MG: 75 TABLET ORAL at 09:09

## 2018-02-16 RX ADMIN — TORSEMIDE 20 MG: 20 TABLET ORAL at 16:53

## 2018-02-16 RX ADMIN — FOLIC ACID 1 MG: 1 TABLET ORAL at 09:09

## 2018-02-16 RX ADMIN — ESCITALOPRAM OXALATE 10 MG: 10 TABLET ORAL at 09:09

## 2018-02-16 RX ADMIN — HEPARIN SODIUM 5000 UNITS: 5000 INJECTION, SOLUTION INTRAVENOUS; SUBCUTANEOUS at 12:26

## 2018-02-16 RX ADMIN — LABETALOL 20 MG/4 ML (5 MG/ML) INTRAVENOUS SYRINGE 10 MG: at 23:46

## 2018-02-16 RX ADMIN — INSULIN GLARGINE 6 UNITS: 100 INJECTION, SOLUTION SUBCUTANEOUS at 22:04

## 2018-02-16 RX ADMIN — INSULIN LISPRO 4 UNITS: 100 INJECTION, SOLUTION INTRAVENOUS; SUBCUTANEOUS at 09:10

## 2018-02-16 RX ADMIN — CHLORTHALIDONE 25 MG: 25 TABLET ORAL at 09:10

## 2018-02-16 RX ADMIN — HEPARIN SODIUM 5000 UNITS: 5000 INJECTION, SOLUTION INTRAVENOUS; SUBCUTANEOUS at 22:05

## 2018-02-16 RX ADMIN — OXYCODONE HYDROCHLORIDE 5 MG: 5 TABLET ORAL at 12:27

## 2018-02-16 RX ADMIN — HEPARIN SODIUM 5000 UNITS: 5000 INJECTION, SOLUTION INTRAVENOUS; SUBCUTANEOUS at 05:28

## 2018-02-16 RX ADMIN — INSULIN LISPRO 2 UNITS: 100 INJECTION, SOLUTION INTRAVENOUS; SUBCUTANEOUS at 22:06

## 2018-02-16 RX ADMIN — LABETALOL HYDROCHLORIDE 200 MG: 100 TABLET, FILM COATED ORAL at 05:29

## 2018-02-16 RX ADMIN — INSULIN LISPRO 2 UNITS: 100 INJECTION, SOLUTION INTRAVENOUS; SUBCUTANEOUS at 12:25

## 2018-02-16 RX ADMIN — INSULIN GLARGINE 7 UNITS: 100 INJECTION, SOLUTION SUBCUTANEOUS at 09:09

## 2018-02-16 RX ADMIN — INSULIN LISPRO 1 UNITS: 100 INJECTION, SOLUTION INTRAVENOUS; SUBCUTANEOUS at 09:21

## 2018-02-16 NOTE — PROGRESS NOTES
Progress Note Yesi Turner 1985, 27 y o  male MRN: [id number]    Unit/Bed#: -01 Encounter: [id number]    Primary Care Provider: Soo Hong DO   Date and time admitted to hospital: 2/10/2018  5:21 PM        Chronic kidney disease, stage 4 (severe) (HCC)   Assessment & Plan    Creatinine is 3 8, trending up  Good urine output  No signs of uremia  Nephrology on board        Anemia in stage 3 chronic kidney disease   Assessment & Plan    Monitor H andH/  Repeat hemoglobin is down to 6 9   Will give 1 unit of packed RBCs  Monitor H and H  Hemoccult is negative  Patient with the uncontrolled blood pressure-may not be able to get the  Epogen        History of lacunar cerebrovascular accident (CVA)   Assessment & Plan    Continue with the current care, no deficits noted on exam        Type 1 diabetes mellitus with diabetic nephropathy, with long-term current use of insulin ([facility] [place] [address] )   Assessment & Plan    Known history of type 1 diabetes  Currently on insulin-Lantus and sliding scale  Monitor blood sugar  Blood sugar is elevated  Adjust the dose of Lantus and monitor        * Hypertensive urgency   Assessment & Plan    Blood pressure was stable over night and in the morning  Monitor for now with the current medication            VTE Pharmacologic Prophylaxis:   Pharmacologic: Heparin  Mechanical VTE Prophylaxis in Place: Yes    Patient Centered Rounds: I have performed bedside rounds with nursing staff today  Discussions with Specialists or Other Care Team Provider:  Nephrology    Education and Discussions with Family / Patient:  Mother updated in the room    Time Spent for Care: 30 minutes  More than 50% of total time spent on counseling and coordination of care as described above      Current Length of Stay: 5 day(s)    Current Patient Status: Inpatient   Certification Statement: The patient will continue to require additional inpatient hospital stay due to Anemia elevated creatinine    Discharge Plan:     Code Status: Level 1 - Full Code      Subjective:   Patient seen and examined  No specific complaints offered  Objective:     Vitals:   Temp (24hrs), Av 4 °F (36 9 °C), Min:98 1 °F (36 7 °C), Max:98 8 °F (37 1 °C)    HR:  [71-90] 78  Resp:  [16-20] 17  BP: (133-188)/(65-95) 188/95  SpO2:  [98 %-100 %] 98 %  Body mass index is 31 12 kg/m²  Input and Output Summary (last 24 hours): Intake/Output Summary (Last 24 hours) at 02/15/18 193  Last data filed at 02/15/18 1700   Gross per 24 hour   Intake             1130 ml   Output             2700 ml   Net            -1570 ml       Physical Exam:     Physical Exam   Constitutional: He is oriented to person, place, and time  He appears well-developed and well-nourished  HENT:   Head: Normocephalic and atraumatic  Eyes: EOM are normal  Pupils are equal, round, and reactive to light  Neck: Normal range of motion  Neck supple  Cardiovascular: Normal rate and regular rhythm  No murmur heard  Pulmonary/Chest: Effort normal    Abdominal: Soft  He exhibits no distension  Musculoskeletal: He exhibits edema  Neurological: He is alert and oriented to person, place, and time  No cranial nerve deficit  Skin: Skin is warm and dry  No erythema         Additional Data:     Labs:      Results from last 7 days  Lab Units 02/15/18  1755 02/15/18  0527   WBC Thousand/uL  --  4 90   HEMOGLOBIN g/dL 8 2* 6 9*   HEMATOCRIT % 24 6* 21 0*   PLATELETS Thousands/uL  --  183   NEUTROS PCT %  --  54   LYMPHS PCT %  --  33   MONOS PCT %  --  8   EOS PCT %  --  4       Results from last 7 days  Lab Units 02/15/18  0527   SODIUM mmol/L 141   POTASSIUM mmol/L 4 9   CHLORIDE mmol/L 109*   CO2 mmol/L 20*   BUN mg/dL 33*   CREATININE mg/dL 3 80*   CALCIUM mg/dL 8 5   TOTAL PROTEIN g/dL 5 4*   BILIRUBIN TOTAL mg/dL 0 30   ALK PHOS U/L 73   ALT U/L 34   AST U/L 41   GLUCOSE RANDOM mg/dL 131       Results from last 7 days  Lab Units 02/12/18  0458   INR  0 90       * I Have Reviewed All Lab Data Listed Above  * Additional Pertinent Lab Tests Reviewed:  [address] Admission Reviewed    Imaging:    Imaging Reports Reviewed Today Include:   Imaging Personally Reviewed by Myself Includes:      Recent Cultures (last 7 days):           Last 24 Hours Medication List:     Current Facility-Administered Medications:  aspirin 162 mg Oral Daily Sindi Vergara PA-C   atorvastatin 40 mg Oral QPM Sindi Vergara PA-C   chlorthalidone 25 mg Oral Daily Seble Pratt MD   clopidogrel 75 mg Oral Daily Sindi Vergara PA-C   [START ON 2/16/2018] ergocalciferol 50,000 Units Oral Weekly Erika Green PA-C   escitalopram 10 mg Oral Daily Sindi Vergara PA-C   folic acid 1 mg Oral Daily Sindi Vergara PA-C   heparin (porcine) 5,000 Units Subcutaneous Q8H [address] Sindi Vergara PA-C   hydrALAZINE 10 mg Intravenous Q6H PRN Wilma Pennington PA-C   hydrALAZINE 100 mg Oral Q8H [address] Seble Pratt MD   insulin glargine 6 Units Subcutaneous HS Sindi Vergara PA-C   insulin glargine 7 Units Subcutaneous Daily With Breakfast Olegario Reyes MD   insulin lispro 1-5 Units Subcutaneous HS Sindi Vergara PA-C   insulin lispro 1-6 Units Subcutaneous TID AC Sindi Vergara PA-C   insulin lispro 4 Units Subcutaneous Daily With Breakfast Sindi Vergara PA-C   insulin lispro 4 Units Subcutaneous Daily With Lunch Sindi Vergara PA-C   insulin lispro 4 Units Subcutaneous Daily With Dinner Sindi Vergara PA-C   labetalol 10 mg Intravenous Q6H PRN DEISI Hancock   labetalol 200 mg Oral Q8H [address] Seble Pratt MD   NIFEdipine ER 60 mg Oral BID (diuretic) Tho Jamil MD   torsemide 20 mg Oral Once Seble Pratt MD   torsemide 20 mg Oral Daily DEISI Nelson        Today, Patient Was Seen By: Olegario Reyes MD    ** Please Note: Dictation voice to text software may have been used in the creation of this document   **

## 2018-02-16 NOTE — ASSESSMENT & PLAN NOTE
Patient with headache earlier today  Headache resolved currently    No focal deficits  If the headache is persistent will obtain a CT scan

## 2018-02-16 NOTE — ASSESSMENT & PLAN NOTE
Blood pressure was stable over night and in the morning      Monitor for now with the current medication

## 2018-02-16 NOTE — ASSESSMENT & PLAN NOTE
Known history of type 1 diabetes  Currently on insulin-Lantus and sliding scale  Monitor blood sugar  Blood sugar is elevated    Adjust the dose of Lantus and monitor

## 2018-02-16 NOTE — PROGRESS NOTES
Progress Note Terence Clark 1985, 27 y o  male MRN: [id number]    Unit/Bed#: -01 Encounter: [id number]    Primary Care Provider: Umair Rangel DO   Date and time admitted to hospital: 2/10/2018  5:21 PM        Chronic kidney disease, stage 4 (severe) ([facility])   Assessment & Plan    Creatinine is 3 5, trending down  Good urine output  No signs of uremia  Nephrology on board        Anemia in stage 3 chronic kidney disease   Assessment & Plan    Monitor H andH/  Status post packed RBCs as today  Hemoglobin improved  Remained stable  Appears to have anemia of chronic disease likely secondary to CKD        History of lacunar cerebrovascular accident (CVA)   Assessment & Plan    Patient with headache earlier today  Headache resolved currently  No focal deficits  If the headache is persistent will obtain a CT scan        Type 1 diabetes mellitus with diabetic nephropathy, with long-term current use of insulin ([facility])   Assessment & Plan    Known history of type 1 diabetes  Currently on insulin-Lantus and sliding scale  Monitor blood sugar  Blood sugar is elevated  Adjust the dose of Lantus and monitor        * Hypertensive urgency   Assessment & Plan    Blood pressure is still elevated, increase the dose of torsemide by Nephrology  Monitor for now                VTE Pharmacologic Prophylaxis:   Pharmacologic: Heparin  Mechanical VTE Prophylaxis in Place: Yes    Patient Centered Rounds: I have performed bedside rounds with nursing staff today  Discussions with Specialists or Other Care Team Provider:     Education and Discussions with Family / Patient:  Mother updated in the room    Time Spent for Care: 30 minutes  More than 50% of total time spent on counseling and coordination of care as described above      Current Length of Stay: 6 day(s)    Current Patient Status: Inpatient   Certification Statement: The patient will continue to require additional inpatient hospital stay due to Hypertension    Discharge Plan:     Code Status: Level 1 - Full Code      Subjective:   Patient seen and examined  Patient reported that he had some headache and later today  Headache was resolved with pain medication and currently he do not have any headache    Objective:     Vitals:   Temp (24hrs), Av 5 °F (36 9 °C), Min:98 4 °F (36 9 °C), Max:98 6 °F (37 °C)    HR:  [72-88] 72  Resp:  [17-18] 18  BP: (160-189)/(62-94) 170/62  SpO2:  [98 %-99 %] 99 %  Body mass index is 30 99 kg/m²  Input and Output Summary (last 24 hours): Intake/Output Summary (Last 24 hours) at 18 1636  Last data filed at 18 1356   Gross per 24 hour   Intake             1130 ml   Output             3175 ml   Net            -2045 ml       Physical Exam:     Physical Exam   Constitutional: He is oriented to person, place, and time  He appears well-developed and well-nourished  HENT:   Head: Normocephalic and atraumatic  Eyes: EOM are normal  Pupils are equal, round, and reactive to light  Neck: Normal range of motion  Neck supple  Cardiovascular: Normal rate and regular rhythm  No murmur heard  Pulmonary/Chest: Effort normal and breath sounds normal  No respiratory distress  He has no wheezes  Abdominal: Soft  Bowel sounds are normal  He exhibits no distension  There is no tenderness  Musculoskeletal: Normal range of motion  He exhibits edema  Neurological: He is alert and oriented to person, place, and time  No cranial nerve deficit  Skin: Skin is warm and dry         Additional Data:     Labs:      Results from last 7 days  Lab Units 18  0510   WBC Thousand/uL 5 10   HEMOGLOBIN g/dL 8 1*   HEMATOCRIT % 24 1*   PLATELETS Thousands/uL 177   NEUTROS PCT % 66   LYMPHS PCT % 21   MONOS PCT % 7   EOS PCT % 5       Results from last 7 days  Lab Units 18  0510 02/15/18  0527   SODIUM mmol/L 140 141   POTASSIUM mmol/L 4 9 4 9   CHLORIDE mmol/L 110* 109*   CO2 mmol/L 20* 20*   BUN mg/dL 34* 33* CREATININE mg/dL 3 58* 3 80*   CALCIUM mg/dL 8 5 8 5   TOTAL PROTEIN g/dL  --  5 4*   BILIRUBIN TOTAL mg/dL  --  0 30   ALK PHOS U/L  --  73   ALT U/L  --  34   AST U/L  --  41   GLUCOSE RANDOM mg/dL 110 131       Results from last 7 days  Lab Units 02/12/18  0458   INR  0 90       * I Have Reviewed All Lab Data Listed Above  * Additional Pertinent Lab Tests Reviewed:  [address] Admission Reviewed    Imaging:    Imaging Reports Reviewed Today Include:   Imaging Personally Reviewed by Myself Includes:      Recent Cultures (last 7 days):           Last 24 Hours Medication List:     Current Facility-Administered Medications:  acetaminophen 650 mg Oral Q6H PRN Aixa Wright MD   aspirin 162 mg Oral Daily Sindi Vergara PA-C   atorvastatin 40 mg Oral QPM Sindi Vergara PA-C   chlorthalidone 25 mg Oral Daily Jamee Reynolds MD   clopidogrel 75 mg Oral Daily Sindi Vergara PA-C   ergocalciferol 50,000 Units Oral Weekly Erika Green PA-C   escitalopram 10 mg Oral Daily Sindi Vergara PA-C   folic acid 1 mg Oral Daily Sindi Vergara PA-C   heparin (porcine) 5,000 Units Subcutaneous Q8H [address] Sindi Vergara PA-C   hydrALAZINE 10 mg Intravenous Q6H PRN Wilma Pennington PA-C   hydrALAZINE 100 mg Oral Q8H [address] Jamee Reynolds MD   insulin glargine 6 Units Subcutaneous HS Sindi Vergara PA-C   insulin glargine 7 Units Subcutaneous Daily With Breakfast Aixa Wright MD   insulin lispro 1-5 Units Subcutaneous HS Sindi Vergara PA-C   insulin lispro 1-6 Units Subcutaneous TID AC Sindi Vergara PA-C   insulin lispro 4 Units Subcutaneous Daily With Breakfast Sindi Vergara PA-C   insulin lispro 4 Units Subcutaneous Daily With Lunch Sindi Vergara PA-C   insulin lispro 4 Units Subcutaneous Daily With Dinner Sindi Vergara PA-C   labetalol 10 mg Intravenous Q6H PRN DEISI Hancock   labetalol 200 mg Oral Q8H Eino SquMD linsey   NIFEdipine ER 60 mg Oral BID (diuretic) Thu Mcarthur MD   torsemide 20 mg Oral Once Kimberly Lovell MD   torsemide 20 mg Oral BID (diuretic) DEISI Buckley        Today, Patient Was Seen By: Janice Everett MD    ** Please Note: Dictation voice to text software may have been used in the creation of this document   **

## 2018-02-16 NOTE — PROGRESS NOTES
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   Due to concerns for for demyelinating disorder patient underwent a course of plasmapheresis urgency -discharged on 02/05/2018       1  Acute kidney injury:  Appears to have resolved- Patient likely at baseline  Will need to continue to trend at steady state  · Creatinine down to 3 58, eGFR 21  2  Chronic kidney disease stage 4:  Baseline creatinine likely near 3 3-3 5  CKD likely due to diabetic nephropathy  · Outpatient follow-up: appointment next Thursday- [place]  3  Nephrotic range proteinuria:  Microalbumin creatinine ratio on 01/23/2018 9281 mg/g   Suspected diabetic nephropathy -previous serologic workup negative  Renal biopsy would likely not change current course/treatment plan  Uncontrolled hypertension also would make renal biopsy contraindicated at this time  4  Hypertensive urgency:  Currently on labetalol 200 mg t i d , hydralazine 100 mg every 8 hours, nifedipine 60 mg twice a day, chlorthalidone 25 mg daily, torsemide 20 mg daily    Blood pressure improving-but remains above goal   Some component of volume overload likely contributing    Weight is declining-significant edema  · Increase torsemide to BID  · Renal artery Doppler evaluation-no evidence of renovascular disease on the right, unable to visualize left renal artery due to excessive bowel gas  · Aldosterone Level less than 1 0, renin 0 288- PAC/PRA ratio 3 47  Which does not support primary aldosteronism  · Metanephrine level pending  · TSH normal  · NO ACEi or ARB due to renal insufficiency/hyperkalemia  5  Hyperkalemia:  Resolved   Potassium 4 9   Secondary to renal failure, dietary discretion, type 4 RTA  · Daily diuretic will help with potassium excretion  Also discussed low-potassium diet  6  Anemia:    · Transfuse for hemoglobin less than 7     · No HETAL due to recent CVA  · Hemoglobin up to 8 1 following transfusion (6 9 on 2/15)  · Iron saturation 28%, ferritin level 222  7  Recent CVA left pontine, history of lacunar CVA  8  Hyperhomocystinemia:  Hematology follow-up  9  CKD MBD:  Phosphorus mildly elevated 4 8 on 02/12/2018   On renal diet   Continue to monitor       SUMMARY OF RECOMMENDATIONS:  · Torsemide 20 mg BID  · Hopefully can discharge in the next 24 hrs    SUBJECTIVE / INTERVAL HISTORY:  C/O headache  No dizziness  OBJECTIVE:  Current Weight: Weight - Scale: 101 kg (222 lb 3 6 oz)  Vitals:    02/16/18 0332 02/16/18 0528 02/16/18 0534 02/16/18 0908   BP: (!) 180/62 160/82  (!) 182/68   BP Location: Right arm Right arm     Pulse:  83  88   Resp:    18   Temp:       TempSrc:       SpO2:    99%   Weight:   101 kg (222 lb 3 6 oz)    Height:           Intake/Output Summary (Last 24 hours) at 02/16/18 1117  Last data filed at 02/16/18 1057   Gross per 24 hour   Intake             1130 ml   Output             3375 ml   Net            -2245 ml     Physical Exam   Constitutional: He is oriented to person, place, and time  He appears well-developed and well-nourished  HENT:   Head: Normocephalic and atraumatic  Eyes: Conjunctivae are normal  No scleral icterus  Neck: No JVD present  Cardiovascular: Normal rate and regular rhythm  Exam reveals no gallop and no friction rub  No murmur heard  Pulmonary/Chest: Effort normal and breath sounds normal  He has no wheezes  He has no rales  Abdominal: Soft   Bowel sounds are normal  He exhibits no distension  There is no tenderness  Musculoskeletal: He exhibits edema  Neurological: He is alert and oriented to person, place, and time  Skin: Skin is warm and dry  Psychiatric: He has a normal mood and affect   His behavior is normal  Judgment and thought content normal        Medications:    Current Facility-Administered Medications:     acetaminophen (TYLENOL) tablet 650 mg, 650 mg, Oral, Q6H PRN, Doyle Boyer MD, 650 mg at 02/16/18 8158    aspirin chewable tablet 162 mg, 162 mg, Oral, Daily, Sindi Vergara PA-C, 162 mg at 02/16/18 0909    atorvastatin (LIPITOR) tablet 40 mg, 40 mg, Oral, QPM, Sindi Vergara PA-C, 40 mg at 02/15/18 1746    chlorthalidone tablet 25 mg, 25 mg, Oral, Daily, Yarelis Espitia MD, 25 mg at 02/16/18 0910    clopidogrel (PLAVIX) tablet 75 mg, 75 mg, Oral, Daily, Sindi Vergara PA-C, 75 mg at 02/16/18 0909    ergocalciferol (VITAMIN D2) capsule 50,000 Units, 50,000 Units, Oral, Weekly, Erika Green PA-C, 50,000 Units at 02/16/18 0910    escitalopram (LEXAPRO) tablet 10 mg, 10 mg, Oral, Daily, Sindi Vergara PA-C, 10 mg at 85/38/23 6325    folic acid (FOLVITE) tablet 1 mg, 1 mg, Oral, Daily, Sindi Vergara PA-C, 1 mg at 02/16/18 0909    heparin (porcine) subcutaneous injection 5,000 Units, 5,000 Units, Subcutaneous, Q8H [facility], 5,000 Units at 02/16/18 0528 **AND** Platelet count, , , Once, Sindi Vergara PA-C    hydrALAZINE (APRESOLINE) injection 10 mg, 10 mg, Intravenous, Q6H PRN, Wilma Pennington PA-C, 10 mg at 02/16/18 0908    hydrALAZINE (APRESOLINE) tablet 100 mg, 100 mg, Oral, Q8H [facility], Yarelis Espitia MD, 100 mg at 02/16/18 0529    insulin glargine (LANTUS) subcutaneous injection 6 Units, 6 Units, Subcutaneous, HS, Sindi Vergara PA-C, 6 Units at 02/15/18 2104    insulin glargine (LANTUS) subcutaneous injection 7 Units, 7 Units, Subcutaneous, Daily With Breakfast, Doyle Boyer MD, 7 Units at 02/16/18 0909    insulin lispro (HumaLOG) 100 units/mL subcutaneous injection 1-5 Units, 1-5 Units, Subcutaneous, HS, Sindi Vergara PA-C, 2 Units at 02/15/18 2105    insulin lispro (HumaLOG) 100 units/mL subcutaneous injection 1-6 Units, 1-6 Units, Subcutaneous, TID AC, 1 Units at 02/16/18 0921 **AND** Fingerstick Glucose (POCT), , , TID AC, Sindi Vergara PA-C    insulin lispro (HumaLOG) 100 units/mL subcutaneous injection 4 Units, 4 Units, Subcutaneous, Daily With Breakfast, Sindi Vergara PA-C, 4 Units at 02/16/18 0910    insulin lispro (HumaLOG) 100 units/mL subcutaneous injection 4 Units, 4 Units, Subcutaneous, Daily With Lunch, Sindi Vergara PA-C, 4 Units at 02/15/18 1159    insulin lispro (HumaLOG) 100 units/mL subcutaneous injection 4 Units, 4 Units, Subcutaneous, Daily With Dinner, Sindi Vergara PA-C, 4 Units at 02/15/18 1659    labetalol (NORMODYNE) injection 10 mg, 10 mg, Intravenous, Q6H PRN, DEISI Rodríguez, 10 mg at 02/16/18 0435    labetalol (NORMODYNE) tablet 200 mg, 200 mg, Oral, Q8H [address], Alicia Sevilla MD, 200 mg at 02/16/18 0529    NIFEdipine (PROCARDIA XL) 24 hr tablet 60 mg, 60 mg, Oral, BID (diuretic), Andry Lynne MD, 60 mg at 02/16/18 0909    torsemide [facility]) tablet 20 mg, 20 mg, Oral, Once, Alicia Sevilla MD    torsemide [facility]) tablet 20 mg, 20 mg, Oral, Daily, DEISI Bills, 20 mg at 02/16/18 8650    Laboratory Results:    Results from last 7 days  Lab Units 02/16/18  0510 02/15/18  1755 02/15/18  0527 02/14/18  0839 02/14/18  0619 02/13/18  0450 02/12/18  0453 02/12/18  0005 02/11/18  1616  02/11/18  0254 02/10/18  2208  02/10/18  1744   WBC Thousand/uL 5 10  --  4 90  --  5 82  --  9 23  --   --   --  9 49  --   --  10 89*   HEMOGLOBIN g/dL 8 1* 8 2* 6 9* 7 2* 6 9*  --  7 9*  --   --   --  7 7*  --   --  8 7*   HEMATOCRIT % 24 1* 24 6* 21 0* 21 3* 20 3*  --  23 7*  --   --   --  23 4*  --   --  26 2*   PLATELETS Thousands/uL 177  --  183  --  178  --  199  -- --   --  189 185  --  261   SODIUM mmol/L 140  --  141  --  141 141 142 142 142  < > 141 142  < >  --    POTASSIUM mmol/L 4 9  --  4 9  --  5 2 5 1 5 2 5 3 5 4*  < > 5 5* 5 8*  < >  --    CHLORIDE mmol/L 110*  --  109*  --  110* 110* 111* 111* 110*  < > 111* 109*  < >  --    CO2 mmol/L 20*  --  20*  --  20* 21 22 24 21  < > 22 21  < >  --    BUN mg/dL 34*  --  33*  --  34* 35* 35* 36* 32*  < > 38* 37*  < >  --    CREATININE mg/dL 3 58*  --  3 80*  --  3 65* 3 73* 3 68* 3 84* 3 82*  < > 3 90* 3 86*  < >  --    CALCIUM mg/dL 8 5  --  8 5  --  8 5 8 5 8 4 8 3 8 6  < > 8 5 9 0  < >  --    MAGNESIUM mg/dL 1 9  --   --   --   --   --  1 8  --   --   --  1 9 2 0  --   --    PHOSPHORUS mg/dL  --   --   --   --   --   --  4 8*  --   --   --  4 4  --   --   --    TOTAL PROTEIN g/dL  --   --  5 4*  --  5 1*  --   --   --   --   --  5 4* 5 6*  --   --    GLUCOSE RANDOM mg/dL 110  --  131  --  178* 185* 91 107 188*  < > 153* 235*  < >  --    < > = values in this interval not displayed    Previous work up:

## 2018-02-16 NOTE — ASSESSMENT & PLAN NOTE
Monitor H andH/  Repeat hemoglobin is down to 6 9   Will give 1 unit of packed RBCs  Monitor H and H  Hemoccult is negative  Patient with the uncontrolled blood pressure-may not be able to get the  Epogen

## 2018-02-16 NOTE — ASSESSMENT & PLAN NOTE
Monitor H andH/  Status post packed RBCs as today  Hemoglobin improved  Remained stable    Appears to have anemia of chronic disease likely secondary to CKD

## 2018-02-17 LAB
ALBUMIN SERPL BCP-MCNC: 2.7 G/DL (ref 3.5–5)
ALP SERPL-CCNC: 78 U/L (ref 46–116)
ALT SERPL W P-5'-P-CCNC: 50 U/L (ref 12–78)
ANION GAP SERPL CALCULATED.3IONS-SCNC: 8 MMOL/L (ref 4–13)
AST SERPL W P-5'-P-CCNC: 25 U/L (ref 5–45)
BASOPHILS # BLD AUTO: 0.02 THOUSANDS/ΜL (ref 0–0.1)
BASOPHILS NFR BLD AUTO: 0 % (ref 0–1)
BILIRUB SERPL-MCNC: 0.3 MG/DL (ref 0.2–1)
BUN SERPL-MCNC: 35 MG/DL (ref 5–25)
CALCIUM SERPL-MCNC: 8.4 MG/DL (ref 8.3–10.1)
CHLORIDE SERPL-SCNC: 107 MMOL/L (ref 100–108)
CO2 SERPL-SCNC: 24 MMOL/L (ref 21–32)
CREAT SERPL-MCNC: 3.6 MG/DL (ref 0.6–1.3)
EOSINOPHIL # BLD AUTO: 0.13 THOUSAND/ΜL (ref 0–0.61)
EOSINOPHIL NFR BLD AUTO: 3 % (ref 0–6)
ERYTHROCYTE [DISTWIDTH] IN BLOOD BY AUTOMATED COUNT: 13.8 % (ref 11.6–15.1)
GFR SERPL CREATININE-BSD FRML MDRD: 21 ML/MIN/1.73SQ M
GLUCOSE SERPL-MCNC: 137 MG/DL (ref 65–140)
GLUCOSE SERPL-MCNC: 254 MG/DL (ref 65–140)
GLUCOSE SERPL-MCNC: 280 MG/DL (ref 65–140)
GLUCOSE SERPL-MCNC: 303 MG/DL (ref 65–140)
GLUCOSE SERPL-MCNC: 377 MG/DL (ref 65–140)
HCT VFR BLD AUTO: 23.4 % (ref 36.5–49.3)
HGB BLD-MCNC: 7.9 G/DL (ref 12–17)
LYMPHOCYTES # BLD AUTO: 0.83 THOUSANDS/ΜL (ref 0.6–4.47)
LYMPHOCYTES NFR BLD AUTO: 18 % (ref 14–44)
MCH RBC QN AUTO: 31 PG (ref 26.8–34.3)
MCHC RBC AUTO-ENTMCNC: 33.8 G/DL (ref 31.4–37.4)
MCV RBC AUTO: 92 FL (ref 82–98)
MONOCYTES # BLD AUTO: 0.35 THOUSAND/ΜL (ref 0.17–1.22)
MONOCYTES NFR BLD AUTO: 8 % (ref 4–12)
NEUTROPHILS # BLD AUTO: 3.22 THOUSANDS/ΜL (ref 1.85–7.62)
NEUTS SEG NFR BLD AUTO: 71 % (ref 43–75)
PLATELET # BLD AUTO: 171 THOUSANDS/UL (ref 149–390)
PMV BLD AUTO: 10 FL (ref 8.9–12.7)
POTASSIUM SERPL-SCNC: 5.1 MMOL/L (ref 3.5–5.3)
PROT SERPL-MCNC: 5.3 G/DL (ref 6.4–8.2)
RBC # BLD AUTO: 2.55 MILLION/UL (ref 3.88–5.62)
SODIUM SERPL-SCNC: 139 MMOL/L (ref 136–145)
WBC # BLD AUTO: 4.55 THOUSAND/UL (ref 4.31–10.16)

## 2018-02-17 PROCEDURE — 99232 SBSQ HOSP IP/OBS MODERATE 35: CPT | Performed by: FAMILY MEDICINE

## 2018-02-17 PROCEDURE — 85025 COMPLETE CBC W/AUTO DIFF WBC: CPT | Performed by: FAMILY MEDICINE

## 2018-02-17 PROCEDURE — 99233 SBSQ HOSP IP/OBS HIGH 50: CPT | Performed by: INTERNAL MEDICINE

## 2018-02-17 PROCEDURE — 80053 COMPREHEN METABOLIC PANEL: CPT | Performed by: FAMILY MEDICINE

## 2018-02-17 PROCEDURE — 82948 REAGENT STRIP/BLOOD GLUCOSE: CPT

## 2018-02-17 RX ORDER — INSULIN GLARGINE 100 [IU]/ML
8 INJECTION, SOLUTION SUBCUTANEOUS
Status: DISCONTINUED | OUTPATIENT
Start: 2018-02-18 | End: 2018-02-19 | Stop reason: HOSPADM

## 2018-02-17 RX ORDER — LABETALOL 100 MG/1
300 TABLET, FILM COATED ORAL EVERY 8 HOURS SCHEDULED
Status: DISCONTINUED | OUTPATIENT
Start: 2018-02-17 | End: 2018-02-19 | Stop reason: HOSPADM

## 2018-02-17 RX ORDER — CHLORTHALIDONE 25 MG/1
50 TABLET ORAL DAILY
Status: DISCONTINUED | OUTPATIENT
Start: 2018-02-18 | End: 2018-02-19 | Stop reason: HOSPADM

## 2018-02-17 RX ORDER — ONDANSETRON 2 MG/ML
4 INJECTION INTRAMUSCULAR; INTRAVENOUS EVERY 6 HOURS PRN
Status: DISCONTINUED | OUTPATIENT
Start: 2018-02-17 | End: 2018-02-19 | Stop reason: HOSPADM

## 2018-02-17 RX ORDER — INSULIN GLARGINE 100 [IU]/ML
8 INJECTION, SOLUTION SUBCUTANEOUS
Status: DISCONTINUED | OUTPATIENT
Start: 2018-02-17 | End: 2018-02-19 | Stop reason: HOSPADM

## 2018-02-17 RX ADMIN — ATORVASTATIN CALCIUM 40 MG: 40 TABLET, FILM COATED ORAL at 17:42

## 2018-02-17 RX ADMIN — LABETALOL HYDROCHLORIDE 200 MG: 100 TABLET, FILM COATED ORAL at 14:36

## 2018-02-17 RX ADMIN — INSULIN LISPRO 6 UNITS: 100 INJECTION, SOLUTION INTRAVENOUS; SUBCUTANEOUS at 08:23

## 2018-02-17 RX ADMIN — NIFEDIPINE 60 MG: 30 TABLET, FILM COATED, EXTENDED RELEASE ORAL at 16:08

## 2018-02-17 RX ADMIN — HEPARIN SODIUM 5000 UNITS: 5000 INJECTION, SOLUTION INTRAVENOUS; SUBCUTANEOUS at 14:36

## 2018-02-17 RX ADMIN — ONDANSETRON 4 MG: 2 INJECTION INTRAMUSCULAR; INTRAVENOUS at 06:37

## 2018-02-17 RX ADMIN — HYDRALAZINE HYDROCHLORIDE 100 MG: 25 TABLET, FILM COATED ORAL at 14:36

## 2018-02-17 RX ADMIN — HEPARIN SODIUM 5000 UNITS: 5000 INJECTION, SOLUTION INTRAVENOUS; SUBCUTANEOUS at 06:30

## 2018-02-17 RX ADMIN — HYDRALAZINE HYDROCHLORIDE 100 MG: 25 TABLET, FILM COATED ORAL at 07:59

## 2018-02-17 RX ADMIN — CHLORTHALIDONE 25 MG: 25 TABLET ORAL at 08:22

## 2018-02-17 RX ADMIN — ESCITALOPRAM OXALATE 10 MG: 10 TABLET ORAL at 08:22

## 2018-02-17 RX ADMIN — LABETALOL 20 MG/4 ML (5 MG/ML) INTRAVENOUS SYRINGE 10 MG: at 06:57

## 2018-02-17 RX ADMIN — INSULIN LISPRO 2 UNITS: 100 INJECTION, SOLUTION INTRAVENOUS; SUBCUTANEOUS at 21:53

## 2018-02-17 RX ADMIN — LABETALOL HYDROCHLORIDE 200 MG: 100 TABLET, FILM COATED ORAL at 08:00

## 2018-02-17 RX ADMIN — TORSEMIDE 20 MG: 20 TABLET ORAL at 16:08

## 2018-02-17 RX ADMIN — INSULIN GLARGINE 8 UNITS: 100 INJECTION, SOLUTION SUBCUTANEOUS at 21:52

## 2018-02-17 RX ADMIN — FOLIC ACID 1 MG: 1 TABLET ORAL at 08:22

## 2018-02-17 RX ADMIN — HEPARIN SODIUM 5000 UNITS: 5000 INJECTION, SOLUTION INTRAVENOUS; SUBCUTANEOUS at 21:52

## 2018-02-17 RX ADMIN — INSULIN LISPRO 4 UNITS: 100 INJECTION, SOLUTION INTRAVENOUS; SUBCUTANEOUS at 12:32

## 2018-02-17 RX ADMIN — INSULIN LISPRO 4 UNITS: 100 INJECTION, SOLUTION INTRAVENOUS; SUBCUTANEOUS at 08:23

## 2018-02-17 RX ADMIN — HYDRALAZINE HYDROCHLORIDE 100 MG: 25 TABLET, FILM COATED ORAL at 21:52

## 2018-02-17 RX ADMIN — NIFEDIPINE 60 MG: 30 TABLET, FILM COATED, EXTENDED RELEASE ORAL at 08:22

## 2018-02-17 RX ADMIN — TORSEMIDE 20 MG: 20 TABLET ORAL at 08:22

## 2018-02-17 RX ADMIN — LABETALOL HYDROCHLORIDE 300 MG: 100 TABLET, FILM COATED ORAL at 21:52

## 2018-02-17 RX ADMIN — INSULIN GLARGINE 7 UNITS: 100 INJECTION, SOLUTION SUBCUTANEOUS at 08:22

## 2018-02-17 RX ADMIN — ASPIRIN 81 MG 162 MG: 81 TABLET ORAL at 08:22

## 2018-02-17 RX ADMIN — CLOPIDOGREL BISULFATE 75 MG: 75 TABLET ORAL at 08:22

## 2018-02-17 NOTE — ASSESSMENT & PLAN NOTE
Creatinine is 3 6, relatively stable    Likely the new baseline is closer to 3 5 or above  Good urine output  No signs of uremia  Nephrology on board

## 2018-02-17 NOTE — ASSESSMENT & PLAN NOTE
Blood pressure is still elevated  Nephrology adjusted the medication and increased the dose of torsemide  Patient still remained with elevated blood pressure    Patient is on multiple agents    Will defer to Nephrology for further management

## 2018-02-17 NOTE — ASSESSMENT & PLAN NOTE
Patient with known history of type 1 diabetes  Blood sugar appears to be elevated    Will adjust the dose of Lantus and the mealtime insulin  Continue with the sliding scale  Monitor blood sugar glucose

## 2018-02-17 NOTE — ASSESSMENT & PLAN NOTE
Monitor H andH/  Status post packed RBCs day before yesterday  Hemoglobin today is 7 9  Slightly lower from yesterday of 8  No signs of active bleeding  Monitor H and H transfuse p r n

## 2018-02-17 NOTE — PROGRESS NOTES
NEPHROLOGY PROGRESS NOTE   Som Mckeon 27 y o  male MRN: [id number]  Unit/Bed#: -39 Encounter: [id number]  Reason for Consult: LLUVIA    ASSESSMENT and PLAN:    1 )  Acute kidney injury  - resolved, back to baseline    2 )  Chronic kidney disease stage IV  - baseline creatinine 2 3-3 5  - outpatient nephrologist: Dr Marla Richardson    3 )  Accelerated hypertension/hypertensive urgency  - currently on labetalol 200 mg t i d   - currently on chlorthalidone 25 mg daily, increase chlorthalidone to 50 mg daily starting tomorrow  - increase labetalol to 300 mg 3 times a day  - hydralazine 100 mg every 8 hours  - avoid Ace inhibitor, ARB, and spironolactone given the hyperkalemia  - Aldosterone Level less than 1 0, renin 0 288- PAC/PRA ratio 3 47  Which does not support primary aldosteronism   - Renal artery Doppler evaluation-no evidence of renovascular disease on the right, unable to visualize left renal artery due to excessive bowel gas    4 )  Volume overload  - continue torsemide 20 mg twice a day  - increase chlorthalidone to 50 mg  - check daily weights  - net -3 L yesterday and overall net negative greater than 9 L since admission    5 )  Hyperkalemia  - resolved      SUMMARY OF RECOMMENDATIONS:    · Increase labetalol to 300 mg  · Increased chlorthalidone to 50 mg  · Continue torsemide  · Check daily weights    SUBJECTIVE / INTERVAL HISTORY:    Swelling unchanged as per the patient    Diuresing really well    OBJECTIVE:  Current Weight: Weight - Scale: 101 kg (221 lb 12 5 oz)  Vitals:    02/17/18 1129 02/17/18 1436 02/17/18 1447 02/17/18 1605   BP: 168/78 (!) 180/90  150/98   BP Location: Right arm   Right arm   Pulse:   79    Resp:   22    Temp:   98 8 °F (37 1 °C)    TempSrc:   Oral    SpO2:   99%    Weight:       Height:           Intake/Output Summary (Last 24 hours) at 02/17/18 1633  Last data filed at 02/17/18 1446   Gross per 24 hour   Intake              600 ml   Output             2775 ml   Net -2175 ml       Physical Exam   Constitutional: He is oriented to person, place, and time  He appears well-developed and well-nourished  HENT:   Head: Normocephalic and atraumatic  Eyes: Pupils are equal, round, and reactive to light  Neck: Neck supple  Cardiovascular: Normal rate, regular rhythm and normal heart sounds  No murmur heard  Pulmonary/Chest: Effort normal and breath sounds normal  No respiratory distress  He has no wheezes  Abdominal: Soft  Bowel sounds are normal    Musculoskeletal: He exhibits edema  Neurological: He is alert and oriented to person, place, and time  Skin: Skin is warm         Medications:    Current Facility-Administered Medications:     acetaminophen (TYLENOL) tablet 650 mg, 650 mg, Oral, Q6H PRN, Doyle Boyer MD, 650 mg at 02/16/18 6609    aspirin chewable tablet 162 mg, 162 mg, Oral, Daily, Sindi Vergara PA-C, 162 mg at 02/17/18 0822    atorvastatin (LIPITOR) tablet 40 mg, 40 mg, Oral, QPM, Sindi Vergara PA-C, 40 mg at 02/16/18 1653    [START ON 2/18/2018] chlorthalidone tablet 50 mg, 50 mg, Oral, Daily, Yarelis Espitia MD    clopidogrel (PLAVIX) tablet 75 mg, 75 mg, Oral, Daily, Sindi Vergara PA-C, 75 mg at 02/17/18 0822    ergocalciferol (VITAMIN D2) capsule 50,000 Units, 50,000 Units, Oral, Weekly, Erika Green PA-C, 50,000 Units at 02/16/18 0910    escitalopram (LEXAPRO) tablet 10 mg, 10 mg, Oral, Daily, Sindi Vergara PA-C, 10 mg at 21/85/58 5809    folic acid (FOLVITE) tablet 1 mg, 1 mg, Oral, Daily, Sindi Vergara PA-C 1 mg at 02/17/18 8119    heparin (porcine) subcutaneous injection 5,000 Units, 5,000 Units, Subcutaneous, Q8H [address], 5,000 Units at 02/17/18 1436 **AND** Platelet count, , , Once, Heidimarie Opperman, PA-C    hydrALAZINE (APRESOLINE) injection 10 mg, 10 mg, Intravenous, Q6H PRN, Wilma Pennington PA-C, 10 mg at 02/16/18 0908    hydrALAZINE (APRESOLINE) tablet 100 mg, 100 mg, Oral, Q8H [address], Betty Person MD, 100 mg at 02/17/18 1436    insulin glargine (LANTUS) subcutaneous injection 8 Units, 8 Units, Subcutaneous, HS, Mahesh Wharton MD  [facility]  [START ON 2/18/2018] insulin glargine (LANTUS) subcutaneous injection 8 Units, 8 Units, Subcutaneous, Daily With Breakfast, Mahesh Wharton MD    insulin lispro (HumaLOG) 100 units/mL subcutaneous injection 1-5 Units, 1-5 Units, Subcutaneous, HS, Sindi Vergara PA-C, 2 Units at 02/16/18 2206    insulin lispro (HumaLOG) 100 units/mL subcutaneous injection 1-6 Units, 1-6 Units, Subcutaneous, TID AC, 4 Units at 02/17/18 1232 **AND** Fingerstick Glucose (POCT), , , TID AC, Sindi Vergara PA-C    [START ON 2/18/2018] insulin lispro (HumaLOG) 100 units/mL subcutaneous injection 5 Units, 5 Units, Subcutaneous, Daily With Breakfast, Mahesh Wharton MD    insulin lispro (HumaLOG) 100 units/mL subcutaneous injection 5 Units, 5 Units, Subcutaneous, Daily With Lunch, Mahesh Wharton MD, 5 Units at 02/17/18 1232    insulin lispro (HumaLOG) 100 units/mL subcutaneous injection 5 Units, 5 Units, Subcutaneous, Daily With Dinner, Mahesh Wharton MD    labetalol (NORMODYNE) injection 10 mg, 10 mg, Intravenous, Q6H PRN, DEISI Kay, 10 mg at 02/17/18 0657    labetalol (NORMODYNE) tablet 200 mg, 200 mg, Oral, Q8H [facility], Shahram Olivo MD, 200 mg at 02/17/18 1436    NIFEdipine (PROCARDIA XL) 24 hr tablet 60 mg, 60 mg, Oral, BID (diuretic), Anju Nixon MD, 60 mg at 02/17/18 1608    ondansetron (ZOFRAN) injection 4 mg, 4 mg, Intravenous, Q6H PRN, Elvia Corbin PA-C, 4 mg at 02/17/18 1059    torsemide (DEMADEX) tablet 20 mg, 20 mg, Oral, Once, Shahram Olivo MD    torsemide [facility]) tablet 20 mg, 20 mg, Oral, BID (diuretic), DEISI Gtz, 20 mg at 02/17/18 1608    Laboratory Results:    Results from last 7 days  Lab Units 02/17/18  1140 02/16/18  0510 02/15/18  1755 02/15/18  0527 02/14/18  1374 02/14/18  7197 02/13/18  0450 02/12/18  0453 02/12/18  0005  02/11/18  0254 02/10/18  2208  02/10/18  1744   WBC Thousand/uL 4 55 5 10  --  4 90  --  5 82  --  9 23  --   --  9 49  --   --  10 89*   HEMOGLOBIN g/dL 7 9* 8 1* 8 2* 6 9* 7 2* 6 9*  --  7 9*  --   --  7 7*  --   --  8 7*   HEMATOCRIT % 23 4* 24 1* 24 6* 21 0* 21 3* 20 3*  --  23 7*  --   --  23 4*  --   --  26 2*   PLATELETS Thousands/uL 171 177  --  183  --  178  --  199  --   --  189 185  --  261   SODIUM mmol/L 139 140  --  141  --  141 141 142 142  < > 141 142  < >  --    POTASSIUM mmol/L 5 1 4 9  --  4 9  --  5 2 5 1 5 2 5 3  < > 5 5* 5 8*  < >  --    CHLORIDE mmol/L 107 110*  --  109*  --  110* 110* 111* 111*  < > 111* 109*  < >  --    CO2 mmol/L 24 20*  --  20*  --  20* 21 22 24  < > 22 21  < >  --    BUN mg/dL 35* 34*  --  33*  --  34* 35* 35* 36*  < > 38* 37*  < >  --    CREATININE mg/dL 3 60* 3 58*  --  3 80*  --  3 65* 3 73* 3 68* 3 84*  < > 3 90* 3 86*  < >  --    CALCIUM mg/dL 8 4 8 5  --  8 5  --  8 5 8 5 8 4 8 3  < > 8 5 9 0  < >  --    MAGNESIUM mg/dL  --  1 9  --   --   --   --   --  1 8  --   --  1 9 2 0  --   --    PHOSPHORUS mg/dL  --   --   --   --   --   --   --  4 8*  --   --  4 4  --   --   --    TOTAL PROTEIN g/dL 5 3*  --   --  5 4*  --  5 1*  --   --   --   --  5 4* 5 6*  --   --    GLUCOSE RANDOM mg/dL 303* 110  --  131  --  178* 185* 91 107  < > 153* 235*  < >  --    < > = values in this interval not displayed

## 2018-02-17 NOTE — PROGRESS NOTES
Progress Note Dodie  1985, 27 y o  male MRN: [id number]    Unit/Bed#: -01 Encounter: [id number]    Primary Care Provider: Chelly Espinoza DO   Date and time admitted to hospital: 2/10/2018  5:21 PM        Chronic kidney disease, stage 4 (severe) ([facility] [address] )   Assessment & Plan    Creatinine is 3 6, relatively stable  Likely the new baseline is closer to 3 5 or above  Good urine output  No signs of uremia  Nephrology on board        Anemia in stage 3 chronic kidney disease   Assessment & Plan    Monitor H andH/  Status post packed RBCs day before yesterday  Hemoglobin today is 7 9  Slightly lower from yesterday of 8  No signs of active bleeding  Monitor H and H transfuse p r n  Type 1 diabetes mellitus with diabetic nephropathy, with long-term current use of insulin [facility])   Assessment & Plan    Patient with known history of type 1 diabetes  Blood sugar appears to be elevated  Will adjust the dose of Lantus and the mealtime insulin  Continue with the sliding scale  Monitor blood sugar glucose        * Hypertensive urgency   Assessment & Plan    Blood pressure is still elevated  Nephrology adjusted the medication and increased the dose of torsemide  Patient still remained with elevated blood pressure    Patient is on multiple agents  Will defer to Nephrology for further management            VTE Pharmacologic Prophylaxis:   Pharmacologic: Heparin  Mechanical VTE Prophylaxis in Place: Yes    Patient Centered Rounds: I have performed bedside rounds with nursing staff today  Discussions with Specialists or Other Care Team Provider:     Education and Discussions with Family / Patient:  Mother updated in the room    Time Spent for Care: 30 minutes  More than 50% of total time spent on counseling and coordination of care as described above      Current Length of Stay: 7 day(s)    Current Patient Status: Inpatient   Certification Statement: The patient will continue to require additional inpatient hospital stay due to Elevated blood pressure    Discharge Plan:     Code Status: Level 1 - Full Code      Subjective:   Patient seen and examined  Patient reported that he vomited earlier x2  Since then no further vomiting  No headache today    Objective:     Vitals:   Temp (24hrs), Av 7 °F (37 1 °C), Min:98 5 °F (36 9 °C), Max:98 9 °F (37 2 °C)    HR:  [72-89] 84  Resp:  [16-18] 18  BP: (168-210)/(62-98) 168/78  SpO2:  [99 %-100 %] 99 %  Body mass index is 30 93 kg/m²  Input and Output Summary (last 24 hours): Intake/Output Summary (Last 24 hours) at 18 1428  Last data filed at 18 1236   Gross per 24 hour   Intake              420 ml   Output             2575 ml   Net            -2155 ml       Physical Exam:     Physical Exam   Constitutional: He appears well-developed and well-nourished  HENT:   Head: Normocephalic and atraumatic  Eyes: EOM are normal  Pupils are equal, round, and reactive to light  Neck: Normal range of motion  Cardiovascular: Normal rate and regular rhythm  Pulmonary/Chest: Effort normal and breath sounds normal    Abdominal: Soft  Bowel sounds are normal    Musculoskeletal: Normal range of motion  He exhibits no edema  Neurological: No cranial nerve deficit  Coordination normal    No gross neurological deficits   Skin: Skin is warm and dry         Additional Data:     Labs:      Results from last 7 days  Lab Units 18  1140   WBC Thousand/uL 4 55   HEMOGLOBIN g/dL 7 9*   HEMATOCRIT % 23 4*   PLATELETS Thousands/uL 171   NEUTROS PCT % 71   LYMPHS PCT % 18   MONOS PCT % 8   EOS PCT % 3       Results from last 7 days  Lab Units 18  1140   SODIUM mmol/L 139   POTASSIUM mmol/L 5 1   CHLORIDE mmol/L 107   CO2 mmol/L 24   BUN mg/dL 35*   CREATININE mg/dL 3 60*   CALCIUM mg/dL 8 4   TOTAL PROTEIN g/dL 5 3*   BILIRUBIN TOTAL mg/dL 0 30   ALK PHOS U/L 78   ALT U/L 50   AST U/L 25   GLUCOSE RANDOM mg/dL 303*       Results from last 7 days  Lab Units 02/12/18  0458   INR  0 90       * I Have Reviewed All Lab Data Listed Above  * Additional Pertinent Lab Tests Reviewed:  [facility] Admission Reviewed    Imaging:    Imaging Reports Reviewed Today Include:   Imaging Personally Reviewed by Myself Includes:      Recent Cultures (last 7 days):           Last 24 Hours Medication List:     Current Facility-Administered Medications:  acetaminophen 650 mg Oral Q6H PRN Aleida Sandhu MD   aspirin 162 mg Oral Daily Sindi Vergara PA-C   atorvastatin 40 mg Oral QPM Sindi Vergara PA-C   chlorthalidone 25 mg Oral Daily Kya León MD   clopidogrel 75 mg Oral Daily Sindi Vergara PA-C   ergocalciferol 50,000 Units Oral Weekly Erikatam Green PA-C   escitalopram 10 mg Oral Daily Sindi Vergara PA-C   folic acid 1 mg Oral Daily Sindi Vergara PA-C   heparin (porcine) 5,000 Units Subcutaneous Q8H [address] Sindi Vergara PA-C   hydrALAZINE 10 mg Intravenous Q6H PRN Wilma Pennington PA-C   hydrALAZINE 100 mg Oral Q8H [address] Kya León MD   insulin glargine 8 Units Subcutaneous HS Aleida Sandhu MD   [START ON 2/18/2018] insulin glargine 8 Units Subcutaneous Daily With Breakfast Aleida Sandhu MD   insulin lispro 1-5 Units Subcutaneous HS Sindi Vergara PA-C   insulin lispro 1-6 Units Subcutaneous TID AC Sindi Vergara PA-C   [START ON 2/18/2018] insulin lispro 5 Units Subcutaneous Daily With Breakfast Aleida Sandhu MD   insulin lispro 5 Units Subcutaneous Daily With Lunch Aleida Sandhu MD   insulin lispro 5 Units Subcutaneous Daily With Lindy Ardon MD   labetalol 10 mg Intravenous Q6H PRN DEISI Miner   labetalol 200 mg Oral Q8H [address] Kya León MD   NIFEdipine ER 60 mg Oral BID (diuretic) Anni Haddad MD   ondansetron 4 mg Intravenous Q6H PRN Lilliana Lester PA-C   torsemide 20 mg Oral Once Kya León MD   torsemide 20 mg Oral BID (diuretic) DEISI Castillo        Today, Patient Was Seen By: Finn Cole Ernesto Turner MD    ** Please Note: Dictation voice to text software may have been used in the creation of this document   **

## 2018-02-18 LAB
ANION GAP SERPL CALCULATED.3IONS-SCNC: 7 MMOL/L (ref 4–13)
BASOPHILS # BLD AUTO: 0.06 THOUSANDS/ΜL (ref 0–0.1)
BASOPHILS NFR BLD AUTO: 2 % (ref 0–1)
BUN SERPL-MCNC: 38 MG/DL (ref 5–25)
CALCIUM SERPL-MCNC: 8.4 MG/DL (ref 8.3–10.1)
CHLORIDE SERPL-SCNC: 107 MMOL/L (ref 100–108)
CO2 SERPL-SCNC: 23 MMOL/L (ref 21–32)
CREAT SERPL-MCNC: 3.92 MG/DL (ref 0.6–1.3)
CREAT UR-MCNC: 88.6 MG/DL
EOSINOPHIL # BLD AUTO: 0.2 THOUSAND/ΜL (ref 0–0.61)
EOSINOPHIL NFR BLD AUTO: 5 % (ref 0–6)
ERYTHROCYTE [DISTWIDTH] IN BLOOD BY AUTOMATED COUNT: 13.8 % (ref 11.6–15.1)
GFR SERPL CREATININE-BSD FRML MDRD: 19 ML/MIN/1.73SQ M
GLUCOSE SERPL-MCNC: 260 MG/DL (ref 65–140)
GLUCOSE SERPL-MCNC: 266 MG/DL (ref 65–140)
GLUCOSE SERPL-MCNC: 273 MG/DL (ref 65–140)
GLUCOSE SERPL-MCNC: 273 MG/DL (ref 65–140)
GLUCOSE SERPL-MCNC: 294 MG/DL (ref 65–140)
HCT VFR BLD AUTO: 23.9 % (ref 36.5–49.3)
HGB BLD-MCNC: 8 G/DL (ref 12–17)
LYMPHOCYTES # BLD AUTO: 1.03 THOUSANDS/ΜL (ref 0.6–4.47)
LYMPHOCYTES NFR BLD AUTO: 26 % (ref 14–44)
MCH RBC QN AUTO: 30.8 PG (ref 26.8–34.3)
MCHC RBC AUTO-ENTMCNC: 33.5 G/DL (ref 31.4–37.4)
MCV RBC AUTO: 92 FL (ref 82–98)
MONOCYTES # BLD AUTO: 0.25 THOUSAND/ΜL (ref 0.17–1.22)
MONOCYTES NFR BLD AUTO: 6 % (ref 4–12)
NEUTROPHILS # BLD AUTO: 2.49 THOUSANDS/ΜL (ref 1.85–7.62)
NEUTS SEG NFR BLD AUTO: 61 % (ref 43–75)
PLATELET # BLD AUTO: 166 THOUSANDS/UL (ref 149–390)
PMV BLD AUTO: 9.5 FL (ref 8.9–12.7)
POTASSIUM SERPL-SCNC: 5.1 MMOL/L (ref 3.5–5.3)
RBC # BLD AUTO: 2.6 MILLION/UL (ref 3.88–5.62)
SODIUM 24H UR-SCNC: 60 MOL/L
SODIUM SERPL-SCNC: 137 MMOL/L (ref 136–145)
UUN 24H UR-MCNC: 321 MG/DL
WBC # BLD AUTO: 4.03 THOUSAND/UL (ref 4.31–10.16)

## 2018-02-18 PROCEDURE — 82570 ASSAY OF URINE CREATININE: CPT | Performed by: INTERNAL MEDICINE

## 2018-02-18 PROCEDURE — 84540 ASSAY OF URINE/UREA-N: CPT | Performed by: INTERNAL MEDICINE

## 2018-02-18 PROCEDURE — 84300 ASSAY OF URINE SODIUM: CPT | Performed by: INTERNAL MEDICINE

## 2018-02-18 PROCEDURE — 99232 SBSQ HOSP IP/OBS MODERATE 35: CPT | Performed by: FAMILY MEDICINE

## 2018-02-18 PROCEDURE — 82948 REAGENT STRIP/BLOOD GLUCOSE: CPT

## 2018-02-18 PROCEDURE — 80048 BASIC METABOLIC PNL TOTAL CA: CPT | Performed by: FAMILY MEDICINE

## 2018-02-18 PROCEDURE — 85025 COMPLETE CBC W/AUTO DIFF WBC: CPT | Performed by: FAMILY MEDICINE

## 2018-02-18 PROCEDURE — 99233 SBSQ HOSP IP/OBS HIGH 50: CPT | Performed by: INTERNAL MEDICINE

## 2018-02-18 RX ADMIN — ATORVASTATIN CALCIUM 40 MG: 40 TABLET, FILM COATED ORAL at 17:27

## 2018-02-18 RX ADMIN — INSULIN LISPRO 3 UNITS: 100 INJECTION, SOLUTION INTRAVENOUS; SUBCUTANEOUS at 17:31

## 2018-02-18 RX ADMIN — TORSEMIDE 20 MG: 20 TABLET ORAL at 08:04

## 2018-02-18 RX ADMIN — HYDRALAZINE HYDROCHLORIDE 100 MG: 25 TABLET, FILM COATED ORAL at 06:32

## 2018-02-18 RX ADMIN — CLOPIDOGREL BISULFATE 75 MG: 75 TABLET ORAL at 08:05

## 2018-02-18 RX ADMIN — CHLORTHALIDONE 50 MG: 25 TABLET ORAL at 08:07

## 2018-02-18 RX ADMIN — ESCITALOPRAM OXALATE 10 MG: 10 TABLET ORAL at 08:05

## 2018-02-18 RX ADMIN — HYDRALAZINE HYDROCHLORIDE 100 MG: 25 TABLET, FILM COATED ORAL at 14:05

## 2018-02-18 RX ADMIN — NIFEDIPINE 60 MG: 30 TABLET, FILM COATED, EXTENDED RELEASE ORAL at 08:04

## 2018-02-18 RX ADMIN — LABETALOL 20 MG/4 ML (5 MG/ML) INTRAVENOUS SYRINGE 10 MG: at 22:26

## 2018-02-18 RX ADMIN — INSULIN LISPRO 4 UNITS: 100 INJECTION, SOLUTION INTRAVENOUS; SUBCUTANEOUS at 08:05

## 2018-02-18 RX ADMIN — HEPARIN SODIUM 5000 UNITS: 5000 INJECTION, SOLUTION INTRAVENOUS; SUBCUTANEOUS at 21:34

## 2018-02-18 RX ADMIN — HEPARIN SODIUM 5000 UNITS: 5000 INJECTION, SOLUTION INTRAVENOUS; SUBCUTANEOUS at 14:05

## 2018-02-18 RX ADMIN — ASPIRIN 81 MG 162 MG: 81 TABLET ORAL at 08:04

## 2018-02-18 RX ADMIN — FOLIC ACID 1 MG: 1 TABLET ORAL at 08:05

## 2018-02-18 RX ADMIN — HYDRALAZINE HYDROCHLORIDE 100 MG: 25 TABLET, FILM COATED ORAL at 21:36

## 2018-02-18 RX ADMIN — INSULIN LISPRO 4 UNITS: 100 INJECTION, SOLUTION INTRAVENOUS; SUBCUTANEOUS at 11:47

## 2018-02-18 RX ADMIN — INSULIN GLARGINE 8 UNITS: 100 INJECTION, SOLUTION SUBCUTANEOUS at 08:05

## 2018-02-18 RX ADMIN — LABETALOL HYDROCHLORIDE 300 MG: 100 TABLET, FILM COATED ORAL at 14:05

## 2018-02-18 RX ADMIN — NIFEDIPINE 60 MG: 30 TABLET, FILM COATED, EXTENDED RELEASE ORAL at 16:38

## 2018-02-18 RX ADMIN — TORSEMIDE 20 MG: 20 TABLET ORAL at 16:37

## 2018-02-18 RX ADMIN — HEPARIN SODIUM 5000 UNITS: 5000 INJECTION, SOLUTION INTRAVENOUS; SUBCUTANEOUS at 06:32

## 2018-02-18 RX ADMIN — INSULIN LISPRO 3 UNITS: 100 INJECTION, SOLUTION INTRAVENOUS; SUBCUTANEOUS at 21:35

## 2018-02-18 RX ADMIN — LABETALOL HYDROCHLORIDE 300 MG: 100 TABLET, FILM COATED ORAL at 21:37

## 2018-02-18 RX ADMIN — INSULIN GLARGINE 8 UNITS: 100 INJECTION, SOLUTION SUBCUTANEOUS at 21:34

## 2018-02-18 RX ADMIN — LABETALOL HYDROCHLORIDE 300 MG: 100 TABLET, FILM COATED ORAL at 06:32

## 2018-02-18 NOTE — ASSESSMENT & PLAN NOTE
Creatinine is 3 9, relatively stable      Good urine output  No signs of uremia  Nephrology on board

## 2018-02-18 NOTE — PROGRESS NOTES
NEPHROLOGY PROGRESS NOTE   Rony Mckeon 27 y o  male MRN: [id number]  Unit/Bed#: -39 Encounter: [id number]  Reason for Consult: LLUVIA    ASSESSMENT and PLAN:    1 )  Acute kidney injury  - resolved, back to baseline, but creatinine has started to trend up  - his weight has decreased from 221-215  - anticipate a higher baseline creatinine to keep med of volume overload and also keep his blood pressure better controlled  - creatinine continues to trend up tomorrow reduce torsemide  - check a fractional excretion of urea     2 )  Chronic kidney disease stage IV  - baseline creatinine 2 3-3 5, anticipate higher baseline creatinine  - outpatient nephrologist: Dr Jaclyn Castellanos     3 )  Accelerated hypertension/hypertensive urgency  - currently on labetalol 200 mg t i d   - chlorthalidone 50 mg  - labetalol to 300 mg 3 times a day  - hydralazine 100 mg every 8 hours  - avoid Ace inhibitor, ARB, and spironolactone given the hyperkalemia  - Aldosterone Level less than 1 0, renin 0 288- PAC/PRA ratio 3 47   Which does not support primary aldosteronism   - Renal artery Doppler evaluation-no evidence of renovascular disease on the right, unable to visualize left renal artery due to excessive bowel gas  - blood pressure overall better controlled, aim to continue gentle reduction of the blood pressure     4 )  Volume overload  - continue torsemide 20 mg twice a day  - increase chlorthalidone to 50 mg  - check daily weights  - creatinine is trending up, will check a blood work tomorrow, if continues to trend up, reduce the dose of the torsemide      5 )  Hyperkalemia  - resolved    SUMMARY OF RECOMMENDATIONS:    · Continue current diuretic dose as currently prescribed  Weights are trending down overall achieving net negative fluid balance    · Blood pressure also better controlled, continue current antihypertensive regiment continue gentle reduction of the blood pressure  · Creatinine is starting to slowly trend up, this could be anticipated in the setting of diuresis as well as better blood pressure control  May be at a higher baseline creatinine if it means keeping him out of volume overload and kidneys blood pressure controlled  · Check a fractional excretion of urea    SUBJECTIVE / INTERVAL HISTORY:    No chest pain or shortness of breath  Swelling is improving    OBJECTIVE:  Current Weight: Weight - Scale: 97 7 kg (215 lb 6 2 oz)  Vitals:    02/18/18 0600 02/18/18 0632 02/18/18 0700 02/18/18 1310   BP:  (!) 184/72 160/82 156/82   BP Location:   Right arm Right arm   Pulse:  99 88    Resp:   18    Temp:   98 2 °F (36 8 °C)    TempSrc:   Oral    SpO2:   98%    Weight: 97 7 kg (215 lb 6 2 oz)      Height:           Intake/Output Summary (Last 24 hours) at 02/18/18 1349  Last data filed at 02/18/18 1310   Gross per 24 hour   Intake              900 ml   Output             3150 ml   Net            -2250 ml       Physical Exam   Constitutional: He is oriented to person, place, and time  He appears well-developed and well-nourished  No distress  HENT:   Head: Normocephalic and atraumatic  Eyes: Pupils are equal, round, and reactive to light  No scleral icterus  Neck: Normal range of motion  Neck supple  Cardiovascular: Normal rate, regular rhythm and normal heart sounds  Exam reveals no gallop and no friction rub  No murmur heard  Pulmonary/Chest: Effort normal and breath sounds normal  No respiratory distress  He has no wheezes  He has no rales  He exhibits no tenderness  Abdominal: Soft  Bowel sounds are normal  He exhibits no distension  There is no tenderness  There is no rebound  Musculoskeletal: Normal range of motion  He exhibits edema  Neurological: He is alert and oriented to person, place, and time  Skin: No rash noted  He is not diaphoretic  Psychiatric: He has a normal mood and affect         Medications:    Current Facility-Administered Medications:     acetaminophen (TYLENOL) tablet 650 mg, 650 mg, Oral, Q6H PRN, Ronit Osuna MD, 650 mg at 02/16/18 3910    aspirin chewable tablet 162 mg, 162 mg, Oral, Daily, Sindi Vergara PA-C, 162 mg at 02/18/18 0804    atorvastatin (LIPITOR) tablet 40 mg, 40 mg, Oral, QPM, Sindi Vergara PA-C, 40 mg at 02/17/18 1742    chlorthalidone tablet 50 mg, 50 mg, Oral, Daily, Graham Hernandez MD, 50 mg at 02/18/18 0807    clopidogrel (PLAVIX) tablet 75 mg, 75 mg, Oral, Daily, Sindi Vergara PA-C, 75 mg at 02/18/18 0805    ergocalciferol (VITAMIN D2) capsule 50,000 Units, 50,000 Units, Oral, Weekly, Erika Green PA-C, 50,000 Units at 02/16/18 0910    escitalopram (LEXAPRO) tablet 10 mg, 10 mg, Oral, Daily, Sindi Vergara PA-C, 10 mg at 06/89/05 1613    folic acid (FOLVITE) tablet 1 mg, 1 mg, Oral, Daily, Sindi Vergara PA-C, 1 mg at 02/18/18 0805    heparin (porcine) subcutaneous injection 5,000 Units, 5,000 Units, Subcutaneous, Q8H [facility], 5,000 Units at 02/18/18 9931 **AND** Platelet count, , , Once, Sindi Vergara PA-C    hydrALAZINE (APRESOLINE) injection 10 mg, 10 mg, Intravenous, Q6H PRN, Wilma Pennington PA-C, 10 mg at 02/16/18 0908    hydrALAZINE (APRESOLINE) tablet 100 mg, 100 mg, Oral, Q8H [facility], Graham Hernandez MD, 100 mg at 02/18/18 4933    insulin glargine (LANTUS) subcutaneous injection 8 Units, 8 Units, Subcutaneous, HS, Ronit Osuna MD, 8 Units at 02/17/18 2152    insulin glargine (LANTUS) subcutaneous injection 8 Units, 8 Units, Subcutaneous, Daily With Breakfast, Ronit Osuna MD, 8 Units at 02/18/18 0805    insulin lispro (HumaLOG) 100 units/mL subcutaneous injection 1-5 Units, 1-5 Units, Subcutaneous, HS, Sindi Vergara PA-C, 2 Units at 02/17/18 2153    insulin lispro (HumaLOG) 100 units/mL subcutaneous injection 1-6 Units, 1-6 Units, Subcutaneous, TID AC, 4 Units at 02/18/18 1147 **AND** Fingerstick Glucose (POCT), , , TID AC, Sindi Vergara PA-C    insulin lispro (HumaLOG) 100 units/mL subcutaneous injection 5 Units, 5 Units, Subcutaneous, Daily With Breakfast, Geeta Pérez MD, 5 Units at 02/18/18 0806    insulin lispro (HumaLOG) 100 units/mL subcutaneous injection 5 Units, 5 Units, Subcutaneous, Daily With Lunch, Getea Pérez MD, 5 Units at 02/18/18 1146    insulin lispro (HumaLOG) 100 units/mL subcutaneous injection 5 Units, 5 Units, Subcutaneous, Daily With Ramona Anthony MD, 5 Units at 02/17/18 1731    labetalol (NORMODYNE) injection 10 mg, 10 mg, Intravenous, Q6H PRN, DEISI Whittaker, 10 mg at 02/17/18 0657    labetalol (NORMODYNE) tablet 300 mg, 300 mg, Oral, Q8H [facility], Eli Emerson MD, 300 mg at 02/18/18 6135    NIFEdipine (PROCARDIA XL) 24 hr tablet 60 mg, 60 mg, Oral, BID (diuretic), Danyel Anderson MD, 60 mg at 02/18/18 0804    ondansetron [facility]) injection 4 mg, 4 mg, Intravenous, Q6H PRN, Hudson Rodriguez PA-C, 4 mg at 02/17/18 0561    torsemide (DEMADEX) tablet 20 mg, 20 mg, Oral, Once, Eli Emerson MD    torsemide [facility]) tablet 20 mg, 20 mg, Oral, BID (diuretic), DEISI Velasquez, 20 mg at 02/18/18 0804    Laboratory Results:    Results from last 7 days  Lab Units 02/18/18  1304 02/17/18  1140 02/16/18  0510 02/15/18  1755 02/15/18  0527 02/14/18  0839 02/14/18  0619 02/13/18  0450 02/12/18  0453   WBC Thousand/uL 4 03* 4 55 5 10  --  4 90  --  5 82  --  9 23   HEMOGLOBIN g/dL 8 0* 7 9* 8 1* 8 2* 6 9* 7 2* 6 9*  --  7 9*   HEMATOCRIT % 23 9* 23 4* 24 1* 24 6* 21 0* 21 3* 20 3*  --  23 7*   PLATELETS Thousands/uL 166 171 177  --  183  --  178  --  199   SODIUM mmol/L 137 139 140  --  141  --  141 141 142   POTASSIUM mmol/L 5 1 5 1 4 9  --  4 9  --  5 2 5 1 5 2   CHLORIDE mmol/L 107 107 110*  --  109*  --  110* 110* 111*   CO2 mmol/L 23 24 20*  --  20*  --  20* 21 22   BUN mg/dL 38* 35* 34*  --  33*  --  34* 35* 35*   CREATININE mg/dL 3 92* 3 60* 3 58*  --  3 80*  --  3 65* 3 73* 3 68*   CALCIUM mg/dL 8 4 8 4 8 5  --  8 5  --  8 5 8 5 8 4   MAGNESIUM mg/dL  --   --  1 9  --   --   -- --   --  1 8   PHOSPHORUS mg/dL  --   --   --   --   --   --   --   --  4 8*   TOTAL PROTEIN g/dL  --  5 3*  --   --  5 4*  --  5 1*  --   --    GLUCOSE RANDOM mg/dL 260* 303* 110  --  131  --  178* 185* 91

## 2018-02-18 NOTE — ASSESSMENT & PLAN NOTE
Patient with known history of type 1 diabetes  Blood sugar appears to be elevated    Adjust the dose of Lantus as today   Monitor blood sugar glucose

## 2018-02-18 NOTE — PROGRESS NOTES
Progress Note Lorie Lopez 1985, 27 y o  male MRN: [id number]    Unit/Bed#: -01 Encounter: [id number]    Primary Care Provider: Sade Linder DO   Date and time admitted to hospital: 2/10/2018  5:21 PM        Chronic kidney disease, stage 4 (severe) ([address] )   Assessment & Plan    Creatinine is 3 9, relatively stable  Good urine output  No signs of uremia  Nephrology on board        Anemia in stage 3 chronic kidney disease   Assessment & Plan    Monitor H andH/  Status post packed RBCs  H and H remained stable        Type 1 diabetes mellitus with diabetic nephropathy, with long-term current use of insulin [facility])   Assessment & Plan    Patient with known history of type 1 diabetes  Blood sugar appears to be elevated  Adjust the dose of Lantus as today   Monitor blood sugar glucose        * Hypertensive urgency   Assessment & Plan    Blood pressure appears to be better controlled today  Multiple medication adjustments made during this hospitalization  Nephrology on board            VTE Pharmacologic Prophylaxis:   Pharmacologic: Heparin  Mechanical VTE Prophylaxis in Place: Yes    Patient Centered Rounds: I have performed bedside rounds with nursing staff today  Discussions with Specialists or Other Care Team Provider:     Education and Discussions with Family / Patient:    Time Spent for Care: 30 minutes  More than 50% of total time spent on counseling and coordination of care as described above  Current Length of Stay: 8 day(s)    Current Patient Status: Inpatient   Certification Statement: The patient will continue to require additional inpatient hospital stay due to Elevated creatinine    Discharge Plan:     Code Status: Level 1 - Full Code      Subjective:   Patient seen and examined  Feeling better  No specific complaints offered    No headache no nausea vomiting    Objective:     Vitals:   Temp (24hrs), Av 4 °F (36 9 °C), Min:98 2 °F (36 8 °C), Max:98 6 °F (37 °C)    HR: [81-99] 83  Resp:  [16-18] 16  BP: (142-184)/(72-92) 142/92  SpO2:  [98 %-100 %] 100 %  Body mass index is 30 04 kg/m²  Input and Output Summary (last 24 hours): Intake/Output Summary (Last 24 hours) at 02/18/18 1743  Last data filed at 02/18/18 1516   Gross per 24 hour   Intake              960 ml   Output             2500 ml   Net            -1540 ml       Physical Exam:     Physical Exam   Constitutional: He is oriented to person, place, and time  He appears well-developed and well-nourished  HENT:   Head: Normocephalic and atraumatic  Eyes: Pupils are equal, round, and reactive to light  Neck: Normal range of motion  Cardiovascular: Normal rate and regular rhythm  Pulmonary/Chest: Effort normal    Abdominal: Soft  Bowel sounds are normal  He exhibits no distension  Musculoskeletal: Normal range of motion  He exhibits edema  Edema is improving  Weight is down   Neurological: He is alert and oriented to person, place, and time  No cranial nerve deficit  Skin: Skin is warm and dry  Additional Data:     Labs:      Results from last 7 days  Lab Units 02/18/18  1304   WBC Thousand/uL 4 03*   HEMOGLOBIN g/dL 8 0*   HEMATOCRIT % 23 9*   PLATELETS Thousands/uL 166   NEUTROS PCT % 61   LYMPHS PCT % 26   MONOS PCT % 6   EOS PCT % 5       Results from last 7 days  Lab Units 02/18/18  1304 02/17/18  1140   SODIUM mmol/L 137 139   POTASSIUM mmol/L 5 1 5 1   CHLORIDE mmol/L 107 107   CO2 mmol/L 23 24   BUN mg/dL 38* 35*   CREATININE mg/dL 3 92* 3 60*   CALCIUM mg/dL 8 4 8 4   TOTAL PROTEIN g/dL  --  5 3*   BILIRUBIN TOTAL mg/dL  --  0 30   ALK PHOS U/L  --  78   ALT U/L  --  50   AST U/L  --  25   GLUCOSE RANDOM mg/dL 260* 303*       Results from last 7 days  Lab Units 02/12/18  0458   INR  0 90       * I Have Reviewed All Lab Data Listed Above  * Additional Pertinent Lab Tests Reviewed:  All Labs For Current Hospital Admission Reviewed    Imaging:    Imaging Reports Reviewed Today Include: Imaging Personally Reviewed by Myself Includes:      Recent Cultures (last 7 days):           Last 24 Hours Medication List:     Current Facility-Administered Medications:  acetaminophen 650 mg Oral Q6H PRN Unique Julien MD   aspirin 162 mg Oral Daily Sindi Vergara PA-C   atorvastatin 40 mg Oral QPM Sindi Vergara PA-C   chlorthalidone 50 mg Oral Daily Lashon Singh MD   clopidogrel 75 mg Oral Daily Sindi Vergara PA-C   ergocalciferol 50,000 Units Oral Weekly Erika KlSAV palma   escitalopram 10 mg Oral Daily Sindi Vergara PA-C   folic acid 1 mg Oral Daily Sindi Vergara PA-C   heparin (porcine) 5,000 Units Subcutaneous Q8H [address] Sindi Vergara PA-C   hydrALAZINE 10 mg Intravenous Q6H PRN Wilma Pennington PA-C   hydrALAZINE 100 mg Oral Q8H [address] Lashon Singh MD   insulin glargine 8 Units Subcutaneous HS Unique Julien MD   insulin glargine 8 Units Subcutaneous Daily With Breakfast Unique Julien MD   insulin lispro 1-5 Units Subcutaneous HS Sindi Vergara PA-C   insulin lispro 1-6 Units Subcutaneous TID AC Sindi Vergara PA-C   insulin lispro 5 Units Subcutaneous Daily With Breakfast Unique Julien MD   insulin lispro 5 Units Subcutaneous Daily With Lunch Unique Julien MD   insulin lispro 5 Units Subcutaneous Daily With Quincy Pyle MD   labetalol 10 mg Intravenous Q6H PRN DEISI Villalpando   labetalol 300 mg Oral Q8H [address] Lashon Singh MD   NIFEdipine ER 60 mg Oral BID (diuretic) Martinez Neff MD   ondansetron 4 mg Intravenous Q6H PRN Diana Rodriguez PA-C   torsemide 20 mg Oral Once Lashon Singh MD   torsemide 20 mg Oral BID (diuretic) DEISI Easley        Today, Patient Was Seen By: Unique Julien MD    ** Please Note: Dictation voice to text software may have been used in the creation of this document   **

## 2018-02-18 NOTE — ASSESSMENT & PLAN NOTE
Blood pressure appears to be better controlled today  Multiple medication adjustments made during this hospitalization  Nephrology on board

## 2018-02-19 ENCOUNTER — APPOINTMENT (OUTPATIENT)
Dept: PHYSICAL THERAPY | Facility: CLINIC | Age: 33
End: 2018-02-19
Payer: COMMERCIAL

## 2018-02-19 ENCOUNTER — APPOINTMENT (OUTPATIENT)
Dept: OCCUPATIONAL THERAPY | Facility: CLINIC | Age: 33
End: 2018-02-19
Payer: COMMERCIAL

## 2018-02-19 VITALS
WEIGHT: 213.63 LBS | DIASTOLIC BLOOD PRESSURE: 80 MMHG | SYSTOLIC BLOOD PRESSURE: 142 MMHG | RESPIRATION RATE: 20 BRPM | TEMPERATURE: 98.2 F | HEIGHT: 71 IN | OXYGEN SATURATION: 99 % | BODY MASS INDEX: 29.91 KG/M2 | HEART RATE: 89 BPM

## 2018-02-19 PROBLEM — R60.0 BILATERAL LEG EDEMA: Status: ACTIVE | Noted: 2018-02-19

## 2018-02-19 LAB
ANION GAP SERPL CALCULATED.3IONS-SCNC: 11 MMOL/L (ref 4–13)
BUN SERPL-MCNC: 39 MG/DL (ref 5–25)
CALCIUM SERPL-MCNC: 8.4 MG/DL (ref 8.3–10.1)
CHLORIDE SERPL-SCNC: 108 MMOL/L (ref 100–108)
CO2 SERPL-SCNC: 22 MMOL/L (ref 21–32)
CREAT SERPL-MCNC: 3.66 MG/DL (ref 0.6–1.3)
ERYTHROCYTE [DISTWIDTH] IN BLOOD BY AUTOMATED COUNT: 13.7 % (ref 11.6–15.1)
GFR SERPL CREATININE-BSD FRML MDRD: 20 ML/MIN/1.73SQ M
GLUCOSE SERPL-MCNC: 130 MG/DL (ref 65–140)
GLUCOSE SERPL-MCNC: 167 MG/DL (ref 65–140)
GLUCOSE SERPL-MCNC: 279 MG/DL (ref 65–140)
HCT VFR BLD AUTO: 23.4 % (ref 36.5–49.3)
HGB BLD-MCNC: 7.9 G/DL (ref 12–17)
MCH RBC QN AUTO: 31.2 PG (ref 26.8–34.3)
MCHC RBC AUTO-ENTMCNC: 33.8 G/DL (ref 31.4–37.4)
MCV RBC AUTO: 93 FL (ref 82–98)
PLATELET # BLD AUTO: 159 THOUSANDS/UL (ref 149–390)
PMV BLD AUTO: 9.2 FL (ref 8.9–12.7)
POTASSIUM SERPL-SCNC: 4.7 MMOL/L (ref 3.5–5.3)
RBC # BLD AUTO: 2.53 MILLION/UL (ref 3.88–5.62)
SODIUM SERPL-SCNC: 141 MMOL/L (ref 136–145)
WBC # BLD AUTO: 4.49 THOUSAND/UL (ref 4.31–10.16)

## 2018-02-19 PROCEDURE — 99232 SBSQ HOSP IP/OBS MODERATE 35: CPT | Performed by: PHYSICIAN ASSISTANT

## 2018-02-19 PROCEDURE — 85027 COMPLETE CBC AUTOMATED: CPT | Performed by: PHYSICIAN ASSISTANT

## 2018-02-19 PROCEDURE — 99239 HOSP IP/OBS DSCHRG MGMT >30: CPT | Performed by: FAMILY MEDICINE

## 2018-02-19 PROCEDURE — 82948 REAGENT STRIP/BLOOD GLUCOSE: CPT

## 2018-02-19 PROCEDURE — 80048 BASIC METABOLIC PNL TOTAL CA: CPT | Performed by: INTERNAL MEDICINE

## 2018-02-19 RX ORDER — INSULIN GLARGINE 100 [IU]/ML
8 INJECTION, SOLUTION SUBCUTANEOUS
Qty: 10 ML | Refills: 0 | Status: SHIPPED | OUTPATIENT
Start: 2018-02-19 | End: 2018-04-02 | Stop reason: SDUPTHER

## 2018-02-19 RX ORDER — NIFEDIPINE 60 MG/1
60 TABLET, FILM COATED, EXTENDED RELEASE ORAL
Qty: 60 TABLET | Refills: 0 | Status: CANCELLED | OUTPATIENT
Start: 2018-02-19

## 2018-02-19 RX ORDER — LABETALOL 300 MG/1
300 TABLET, FILM COATED ORAL EVERY 8 HOURS SCHEDULED
Qty: 90 TABLET | Refills: 0 | Status: SHIPPED | OUTPATIENT
Start: 2018-02-19 | End: 2018-02-22 | Stop reason: SDUPTHER

## 2018-02-19 RX ORDER — CHLORTHALIDONE 50 MG/1
50 TABLET ORAL DAILY
Qty: 30 TABLET | Refills: 0 | Status: SHIPPED | OUTPATIENT
Start: 2018-02-20 | End: 2018-02-22 | Stop reason: SDUPTHER

## 2018-02-19 RX ORDER — ONDANSETRON 4 MG/1
4 TABLET, FILM COATED ORAL EVERY 8 HOURS PRN
Qty: 20 TABLET | Refills: 0 | Status: SHIPPED | OUTPATIENT
Start: 2018-02-19 | End: 2018-08-16

## 2018-02-19 RX ORDER — INSULIN GLARGINE 100 [IU]/ML
8 INJECTION, SOLUTION SUBCUTANEOUS
Qty: 10 ML | Refills: 0 | Status: SHIPPED | OUTPATIENT
Start: 2018-02-20 | End: 2018-04-02 | Stop reason: SDUPTHER

## 2018-02-19 RX ORDER — NIFEDIPINE 60 MG/1
60 TABLET, FILM COATED, EXTENDED RELEASE ORAL 2 TIMES DAILY
Refills: 0
Start: 2018-02-19 | End: 2018-02-22 | Stop reason: SDUPTHER

## 2018-02-19 RX ORDER — TORSEMIDE 20 MG/1
20 TABLET ORAL
Refills: 0
Start: 2018-02-19 | End: 2018-02-22 | Stop reason: SDUPTHER

## 2018-02-19 RX ADMIN — TORSEMIDE 20 MG: 20 TABLET ORAL at 08:32

## 2018-02-19 RX ADMIN — FOLIC ACID 1 MG: 1 TABLET ORAL at 08:32

## 2018-02-19 RX ADMIN — NIFEDIPINE 60 MG: 30 TABLET, FILM COATED, EXTENDED RELEASE ORAL at 08:32

## 2018-02-19 RX ADMIN — ASPIRIN 81 MG 162 MG: 81 TABLET ORAL at 08:32

## 2018-02-19 RX ADMIN — LABETALOL HYDROCHLORIDE 300 MG: 100 TABLET, FILM COATED ORAL at 06:01

## 2018-02-19 RX ADMIN — HYDRALAZINE HYDROCHLORIDE 100 MG: 25 TABLET, FILM COATED ORAL at 06:03

## 2018-02-19 RX ADMIN — CLOPIDOGREL BISULFATE 75 MG: 75 TABLET ORAL at 08:32

## 2018-02-19 RX ADMIN — HYDRALAZINE HYDROCHLORIDE 100 MG: 25 TABLET, FILM COATED ORAL at 15:59

## 2018-02-19 RX ADMIN — INSULIN LISPRO 3 UNITS: 100 INJECTION, SOLUTION INTRAVENOUS; SUBCUTANEOUS at 11:53

## 2018-02-19 RX ADMIN — HEPARIN SODIUM 5000 UNITS: 5000 INJECTION, SOLUTION INTRAVENOUS; SUBCUTANEOUS at 06:03

## 2018-02-19 RX ADMIN — INSULIN GLARGINE 8 UNITS: 100 INJECTION, SOLUTION SUBCUTANEOUS at 08:32

## 2018-02-19 RX ADMIN — ESCITALOPRAM OXALATE 10 MG: 10 TABLET ORAL at 08:32

## 2018-02-19 RX ADMIN — CHLORTHALIDONE 50 MG: 25 TABLET ORAL at 08:35

## 2018-02-19 RX ADMIN — INSULIN LISPRO 1 UNITS: 100 INJECTION, SOLUTION INTRAVENOUS; SUBCUTANEOUS at 08:37

## 2018-02-19 RX ADMIN — LABETALOL HYDROCHLORIDE 300 MG: 100 TABLET, FILM COATED ORAL at 15:59

## 2018-02-19 NOTE — CASE MANAGEMENT
Continued Stay Review    Date: 2018    Vitals:   Temp (24hrs), Av 7 °F (37 1 °C), Min:98 5 °F (36 9 °C), Max:98 9 °F (37 2 °C)     HR:  [72-89] 84  Resp:  [16-18] 18  BP: (168-210)/(62-98) 168/78  SpO2:  [99 %-100 %] 99 %  Body mass index is 30 93 kg/m²  Medications:   Scheduled Meds:   Current Facility-Administered Medications:  aspirin 162 mg Oral Daily   atorvastatin 40 mg Oral QPM   chlorthalidone 50 mg Oral Daily   clopidogrel 75 mg Oral Daily   ergocalciferol 50,000 Units Oral Weekly   escitalopram 10 mg Oral Daily   folic acid 1 mg Oral Daily   heparin (porcine) 5,000 Units Subcutaneous Q8H [address]   hydrALAZINE 10 mg Intravenous Q6H PRN   hydrALAZINE 100 mg Oral Q8H HALIE   insulin glargine 8 Units Subcutaneous HS   insulin glargine 8 Units Subcutaneous Daily With Breakfast   insulin lispro 1-5 Units Subcutaneous HS   insulin lispro 1-6 Units Subcutaneous TID AC   insulin lispro 5 Units Subcutaneous Daily With Breakfast   insulin lispro 5 Units Subcutaneous Daily With Lunch   insulin lispro 5 Units Subcutaneous Daily With Dinner   labetalol 300 mg Oral Q8H HALIE   NIFEdipine ER 60 mg Oral BID (diuretic)   torsemide 20 mg Oral BID (diuretic)     Abnormal Labs/Diagnostic Results: H/H 7  4, Bun 35, Creat 3 60    Age/Sex: 27 y o  male     Assessment/Plan:     Chronic kidney disease, stage 4 (severe) ([facility])   Assessment & Plan     Creatinine is 3 6, relatively stable  Likely the new baseline is closer to 3 5 or above  Good urine output  No signs of uremia  Nephrology on board          Anemia in stage 3 chronic kidney disease   Assessment & Plan     Monitor H andH/  Status post packed RBCs day before yesterday  Hemoglobin today is 7 9  Slightly lower from yesterday of 8  No signs of active bleeding    Monitor H and H transfuse p r n           Type 1 diabetes mellitus with diabetic nephropathy, with long-term current use of insulin [facility])   Assessment & Plan     Patient with known history of type 1 diabetes  Blood sugar appears to be elevated  Will adjust the dose of Lantus and the mealtime insulin  Continue with the sliding scale  Monitor blood sugar glucose          * Hypertensive urgency   Assessment & Plan     Blood pressure is still elevated  Nephrology adjusted the medication and increased the dose of torsemide  Patient still remained with elevated blood pressure    Patient is on multiple agents  Will defer to Nephrology for further management                VTE Pharmacologic Prophylaxis:   Pharmacologic: Heparin  Mechanical VTE Prophylaxis in Place:  Yes       Current Length of Stay: 7 day(s)     Current Patient Status: Inpatient   Certification Statement: The patient will continue to require additional inpatient hospital stay due to Elevated blood pressure     Renal Daily Review:   Accelerated hypertension/hypertensive urgency  - currently on labetalol 200 mg t i d   - currently on chlorthalidone 25 mg daily, increase chlorthalidone to 50 mg daily starting tomorrow  - increase labetalol to 300 mg 3 times a day  - hydralazine 100 mg every 8 hours  - avoid Ace inhibitor, ARB, and spironolactone given the hyperkalemia  - Aldosterone Level less than 1 0, renin 0 288- PAC/PRA ratio 3 47   Which does not support primary aldosteronism   - Renal artery Doppler evaluation-no evidence of renovascular disease on the right, unable to visualize left renal artery due to excessive bowel gas       Volume overload  - continue torsemide 20 mg twice a day  - increase chlorthalidone to 50 mg  - check daily weights  - net -3 L yesterday and overall net negative greater than 9 L since admission

## 2018-02-19 NOTE — PROGRESS NOTES
NEPHROLOGY PROGRESS NOTE   Shalini Townsendcristy Mckeon 27 y o  male MRN: [id number]  Unit/Bed#: -62 Encounter: [id number]  Reason for Consult: LLUVIA/CKD/HTN    ASSESSMENT/PLAN:  1  Acute Kidney Injury- secondary to accelerated hypertension and recent LLUVIA  Creatinine improving and now close to baseline   - no acute indication for dialysis  - avoid nephrotoxics  2  Chronic Kidney Disease stage IV- Baseline creatinine 3 3-3 5  Etiology secondary to diabetic nephropathy  Follows with Dr Angel Luna  3  Nephrotic Range Proteinuria- likely secondary to diabetic nephropathy, serologic workup previously negative  4  Accelerated Hypertension- BP improving, will continue to manage as an outpatient  - secondary workup negative for primary aldosteronism, pheochromocytoma, and renovascular disease of the right side (left unable to be visualized)  - antihypertensive regimen: labetalol 300mg TID, chlorthalidone 50mg daily, hydralazine 100mg TID, nifedipine 60mg BID, torsemide 20mg BID  - patient aware to avoid salt and check his weights and blood pressures at home  5  Volume Overload- continue with torsemide 20mg BID and chlorthalidone 50mg daily  - may be able to increase if no weight loss as an outpatient  6  Hyperkalemia- low K diet, improved  7  Anemia- no erwin due to recent cva  - iron studies acceptable  - hgb stable at 7 9    Disposition:  Okay from a renal standpoint for discharge  Follow up appointment with DEISI Falk on Thursday 2/22  [facility] Wednesday before appointment if discharged  SUBJECTIVE:  Patient wants to go home  States LE edema is slightly improved      OBJECTIVE:  Current Weight: Weight - Scale: 96 9 kg (213 lb 10 oz)  Vitals:    02/19/18 0020 02/19/18 0600 02/19/18 0601 02/19/18 0700   BP: 170/78  162/78 142/80   BP Location: Right arm   Right arm   Pulse:   89 89   Resp:    20   Temp:    98 2 °F (36 8 °C)   TempSrc:    Oral   SpO2:    99%   Weight:  96 9 kg (213 lb 10 oz)     Height: Intake/Output Summary (Last 24 hours) at 02/19/18 1248  Last data filed at 02/19/18 0900   Gross per 24 hour   Intake              960 ml   Output             2425 ml   Net            -1465 ml     General: NAD  Skin: no rash  HEENT: normocephalic   Neck: supple  Chest: CTAB  Heart: RRR  Abdomen: soft, nt, nd  Extremities: ++ edema  Neuro: alert awake  Psych: mood and affect appropriate    Medications:    Current Facility-Administered Medications:     acetaminophen (TYLENOL) tablet 650 mg, 650 mg, Oral, Q6H PRN, Rice Jeans, MD, 650 mg at 02/16/18 4240    aspirin chewable tablet 162 mg, 162 mg, Oral, Daily, Sindi Vergara PA-C, 162 mg at 02/19/18 7630    atorvastatin (LIPITOR) tablet 40 mg, 40 mg, Oral, QPM, Sindi Vergara PA-C, 40 mg at 02/18/18 1727    chlorthalidone tablet 50 mg, 50 mg, Oral, Daily, Viriidana Lehman MD, 50 mg at 02/19/18 0835    clopidogrel (PLAVIX) tablet 75 mg, 75 mg, Oral, Daily, Sindi Vergara PA-C, 75 mg at 02/19/18 3949    ergocalciferol (VITAMIN D2) capsule 50,000 Units, 50,000 Units, Oral, Weekly, Erika Green PA-C, 50,000 Units at 02/16/18 0910    escitalopram (LEXAPRO) tablet 10 mg, 10 mg, Oral, Daily, Sindi Vergara PA-C, 10 mg at 82/06/43 1817    folic acid (FOLVITE) tablet 1 mg, 1 mg, Oral, Daily, Sindi Vergara PA-C, 1 mg at 02/19/18 5401    heparin (porcine) subcutaneous injection 5,000 Units, 5,000 Units, Subcutaneous, Q8H [facility], 5,000 Units at 02/19/18 0603 **AND** Platelet count, , , Once, Sindi Vergara PA-C    hydrALAZINE (APRESOLINE) injection 10 mg, 10 mg, Intravenous, Q6H PRN, Wilma Pennington PA-C, 10 mg at 02/16/18 0908    hydrALAZINE (APRESOLINE) tablet 100 mg, 100 mg, Oral, Q8H [facility] [place] MD Dominik, 100 mg at 02/19/18 0603    insulin glargine (LANTUS) subcutaneous injection 8 Units, 8 Units, Subcutaneous, HS, Rice Jeans, MD, 8 Units at 02/18/18 2134    insulin glargine (LANTUS) subcutaneous injection 8 Units, 8 Units, Subcutaneous, Daily With Breakfast, Nat Mancera MD, 8 Units at 02/19/18 0832    insulin lispro (HumaLOG) 100 units/mL subcutaneous injection 1-5 Units, 1-5 Units, Subcutaneous, HS, Sindi Vergara PA-C, 3 Units at 02/18/18 2135    insulin lispro (HumaLOG) 100 units/mL subcutaneous injection 1-6 Units, 1-6 Units, Subcutaneous, TID AC, 3 Units at 02/19/18 1153 **AND** Fingerstick Glucose (POCT), , , TID AC, Sindi Vergara PA-C    insulin lispro (HumaLOG) 100 units/mL subcutaneous injection 5 Units, 5 Units, Subcutaneous, Daily With Breakfast, Nat Mancera MD, 5 Units at 02/19/18 0838    insulin lispro (HumaLOG) 100 units/mL subcutaneous injection 5 Units, 5 Units, Subcutaneous, Daily With Lunch, Nat Mancera MD, 5 Units at 02/19/18 1153    insulin lispro (HumaLOG) 100 units/mL subcutaneous injection 5 Units, 5 Units, Subcutaneous, Daily With Mairlin Arboleda MD, 5 Units at 02/18/18 1730    labetalol (NORMODYNE) injection 10 mg, 10 mg, Intravenous, Q6H PRN, DEISI Waite, 10 mg at 02/18/18 2226    labetalol (NORMODYNE) tablet 300 mg, 300 mg, Oral, Q8H [facility], Jamari Hancock MD, 300 mg at 02/19/18 0601    NIFEdipine (PROCARDIA XL) 24 hr tablet 60 mg, 60 mg, Oral, BID (diuretic), Terence Marshall MD, 60 mg at 02/19/18 0832    ondansetron (ZOFRAN) injection 4 mg, 4 mg, Intravenous, Q6H PRN, Ihsan Gaxiola PA-C, 4 mg at 02/17/18 2312    torsemide (DEMADEX) tablet 20 mg, 20 mg, Oral, Once, Jamari Hancock MD    torsemide [facility]) tablet 20 mg, 20 mg, Oral, BID (diuretic), DEISI Nix, 20 mg at 02/19/18 0877    Laboratory Results:    Results from last 7 days  Lab Units 02/19/18  0434 02/19/18  0055 02/18/18  1304 02/17/18  1140 02/16/18  0510 02/15/18  1755 02/15/18  0527 02/14/18  0839 02/14/18  0619  02/13/18  0450   WBC Thousand/uL  --  4 49 4 03* 4 55 5 10  --  4 90  --  5 82  --   --    HEMOGLOBIN g/dL  --  7 9* 8 0* 7 9* 8 1* 8 2* 6 9* 7 2* 6 9*  < >  --    HEMATOCRIT %  --  23 4* 23 9* 23 4* 24 1* 24 6* 21 0* 21 3* 20 3*  < >  --    PLATELETS Thousands/uL  --  159 166 171 177  --  183  --  178  --   --    SODIUM mmol/L 141  --  137 139 140  --  141  --  141  --  141   POTASSIUM mmol/L 4 7  --  5 1 5 1 4 9  --  4 9  --  5 2  --  5 1   CHLORIDE mmol/L 108  --  107 107 110*  --  109*  --  110*  --  110*   CO2 mmol/L 22  --  23 24 20*  --  20*  --  20*  --  21   BUN mg/dL 39*  --  38* 35* 34*  --  33*  --  34*  --  35*   CREATININE mg/dL 3 66*  --  3 92* 3 60* 3 58*  --  3 80*  --  3 65*  --  3 73*   CALCIUM mg/dL 8 4  --  8 4 8 4 8 5  --  8 5  --  8 5  --  8 5   MAGNESIUM mg/dL  --   --   --   --  1 9  --   --   --   --   --   --    TOTAL PROTEIN g/dL  --   --   --  5 3*  --   --  5 4*  --  5 1*  --   --    GLUCOSE RANDOM mg/dL 130  --  260* 303* 110  --  131  --  178*  --  185*   < > = values in this interval not displayed

## 2018-02-19 NOTE — CASE MANAGEMENT
Continued Stay Review    Date: 02/18/2018    Vital Signs: /80 (BP Location: Right arm)   Pulse 89   Temp 98 2 °F (36 8 °C) (Oral)   Resp 20   Ht 5' 11" (1 803 m)   Wt 96 9 kg (213 lb 10 oz)   SpO2 99%   BMI 29 79 kg/m²     Medications:   Scheduled Meds:   Current Facility-Administered Medications:  aspirin 162 mg Oral Daily   atorvastatin 40 mg Oral QPM   chlorthalidone 50 mg Oral Daily   clopidogrel 75 mg Oral Daily   ergocalciferol 50,000 Units Oral Weekly   escitalopram 10 mg Oral Daily   folic acid 1 mg Oral Daily   heparin (porcine) 5,000 Units Subcutaneous Q8H [address]   hydrALAZINE 100 mg Oral Q8H [address]   insulin glargine 8 Units Subcutaneous HS   insulin glargine 8 Units Subcutaneous Daily With Breakfast   insulin lispro 1-5 Units Subcutaneous HS   insulin lispro 1-6 Units Subcutaneous TID AC   insulin lispro 5 Units Subcutaneous Daily With Breakfast   insulin lispro 5 Units Subcutaneous Daily With Lunch   insulin lispro 5 Units Subcutaneous Daily With Dinner   labetalol 10 mg Intravenous Q6H PRN   labetalol 300 mg Oral Q8H HALIE   NIFEdipine ER 60 mg Oral BID (diuretic)   torsemide 20 mg Oral BID (diuretic)       Abnormal Labs/Diagnostic Results:     Age/Sex: 27 y o  male     Assessment/Plan: Renal    ASSESSMENT and PLAN:     1 )  Acute kidney injury  - resolved, back to baseline, but creatinine has started to trend up  - his weight has decreased from 221-215  - anticipate a higher baseline creatinine to keep med of volume overload and also keep his blood pressure better controlled  - creatinine continues to trend up tomorrow reduce torsemide  - check a fractional excretion of urea     2 )  Chronic kidney disease stage IV  - baseline creatinine 2 3-3 5, anticipate higher baseline creatinine  - outpatient nephrologist: Dr Pedrito Hairston     3 )  Accelerated hypertension/hypertensive urgency  - currently on labetalol 200 mg t i d   - chlorthalidone 50 mg  - labetalol to 300 mg 3 times a day  - hydralazine 100 mg every 8 hours  - avoid Ace inhibitor, ARB, and spironolactone given the hyperkalemia  - Aldosterone Level less than 1 0, renin 0 288- PAC/PRA ratio 3 47   Which does not support primary aldosteronism   - Renal artery Doppler evaluation-no evidence of renovascular disease on the right, unable to visualize left renal artery due to excessive bowel gas  - blood pressure overall better controlled, aim to continue gentle reduction of the blood pressure     4 )  Volume overload  - continue torsemide 20 mg twice a day  - increase chlorthalidone to 50 mg  - check daily weights  - creatinine is trending up, will check a blood work tomorrow, if continues to trend up, reduce the dose of the torsemide      5 )  Hyperkalemia  - resolved     SUMMARY OF RECOMMENDATIONS:     · Continue current diuretic dose as currently prescribed  Weights are trending down overall achieving net negative fluid balance  · Blood pressure also better controlled, continue current antihypertensive regiment continue gentle reduction of the blood pressure  · Creatinine is starting to slowly trend up, this could be anticipated in the setting of diuresis as well as better blood pressure control  May be at a higher baseline creatinine if it means keeping him out of volume overload and kidneys blood pressure controlled    · Check a fractional excretion of urea

## 2018-02-19 NOTE — CASE MANAGEMENT
Continued Stay Review    Date: 2018    Vitals:   Temp (24hrs), Av 5 °F (36 9 °C), Min:98 4 °F (36 9 °C), Max:98 6 °F (37 °C)     HR:  [72-88] 72  Resp:  [17-18] 18  BP: (160-189)/(62-94) 170/62  SpO2:  [98 %-99 %] 99 %  Body mass index is 30 99 kg/m²         Medications:   Scheduled Meds:   Current Facility-Administered Medications:  aspirin 162 mg Oral Daily   atorvastatin 40 mg Oral QPM   chlorthalidone 50 mg Oral Daily   clopidogrel 75 mg Oral Daily   ergocalciferol 50,000 Units Oral Weekly   escitalopram 10 mg Oral Daily   folic acid 1 mg Oral Daily   heparin (porcine) 5,000 Units Subcutaneous Q8H [facility]   hydrALAZINE 100 mg Oral Q8H [facility]   insulin glargine 8 Units Subcutaneous HS   insulin glargine 8 Units Subcutaneous Daily With Breakfast   insulin lispro 1-5 Units Subcutaneous HS   insulin lispro 1-6 Units Subcutaneous TID AC   insulin lispro 5 Units Subcutaneous Daily With Breakfast   insulin lispro 5 Units Subcutaneous Daily With Lunch   insulin lispro 5 Units Subcutaneous Daily With Dinner   labetalol 300 mg Oral Q8H HALIE   NIFEdipine ER 60 mg Oral BID (diuretic)   torsemide 20 mg Oral Once   torsemide 20 mg Oral BID (diuretic)     IV Hydralazine 10 mg x 1 last 24 hours  IV Labetalol 10 mg x 2 last 24 hours    Abnormal Labs/Diagnostic Results: H/H 8  1,   CHLORIDE mmol/L 110*   CO2 mmol/L 20*   BUN mg/dL 34*   CREATININE mg/dL 3 58*         Age/Sex: 27 y o  male     Assessment/Plan:     Chronic kidney disease, stage 4 (severe) ([facility])   Assessment & Plan     Creatinine is 3 5, trending down  Good urine output  No signs of uremia  Nephrology on board          Anemia in stage 3 chronic kidney disease   Assessment & Plan     Monitor H andH/  Status post packed RBCs as today  Hemoglobin improved  Remained stable  Appears to have anemia of chronic disease likely secondary to CKD          History of lacunar cerebrovascular accident (CVA)   Assessment & Plan     Patient with headache earlier today  Headache resolved currently  No focal deficits  If the headache is persistent will obtain a CT scan          Type 1 diabetes mellitus with diabetic nephropathy, with long-term current use of insulin (HCC)   Assessment & Plan     Known history of type 1 diabetes  Currently on insulin-Lantus and sliding scale  Monitor blood sugar  Blood sugar is elevated  Adjust the dose of Lantus and monitor          * Hypertensive urgency   Assessment & Plan     Blood pressure is still elevated, increase the dose of torsemide by Nephrology  Monitor for now                      VTE Pharmacologic Prophylaxis:   Pharmacologic: Heparin  Mechanical VTE Prophylaxis in Place:  Yes     Current Length of Stay: 6 day(s)     Current Patient Status: Inpatient   Certification Statement: The patient will continue to require additional inpatient hospital stay due to Hypertension

## 2018-02-19 NOTE — CASE MANAGEMENT
Continued Stay Review    Date: 02/19/2018    Vital Signs: /80 (BP Location: Right arm)   Pulse 89   Temp 98 2 °F (36 8 °C) (Oral)   Resp 20   Ht 5' 11" (1 803 m)   Wt 96 9 kg (213 lb 10 oz)   SpO2 99%   BMI 29 79 kg/m²     Medications:   Scheduled Meds:   Current Facility-Administered Medications:  aspirin 162 mg Oral Daily   atorvastatin 40 mg Oral QPM   chlorthalidone 50 mg Oral Daily   clopidogrel 75 mg Oral Daily   ergocalciferol 50,000 Units Oral Weekly   escitalopram 10 mg Oral Daily   folic acid 1 mg Oral Daily   heparin (porcine) 5,000 Units Subcutaneous Q8H [address]   hydrALAZINE 100 mg Oral Q8H [address]   insulin glargine 8 Units Subcutaneous HS   insulin glargine 8 Units Subcutaneous Daily With Breakfast   insulin lispro 1-5 Units Subcutaneous HS   insulin lispro 1-6 Units Subcutaneous TID AC   insulin lispro 5 Units Subcutaneous Daily With Breakfast   insulin lispro 5 Units Subcutaneous Daily With Lunch   insulin lispro 5 Units Subcutaneous Daily With Dinner   labetalol 300 mg Oral Q8H HALIE   NIFEdipine ER 60 mg Oral BID (diuretic)   torsemide 20 mg Oral BID (diuretic)     IV Labetalol 10 mg x 1 last 24 hours    Abnormal Labs/Diagnostic Results: Bun 39, Creat 3 66    Age/Sex: 27 y o  male     Assessment/Plan:     NEPHROLOGY PROGRESS NOTE   Juan Mckeon 27 y o  male MRN: [id number]  Unit/Bed#: -01 Encounter: [id number]  Reason for Consult: LLUVIA/CKD/HTN     ASSESSMENT/PLAN:  1  Acute Kidney Injury- secondary to accelerated hypertension and recent LLUVIA  Creatinine improving and now close to baseline   - no acute indication for dialysis  - avoid nephrotoxics  2  Chronic Kidney Disease stage IV- Baseline creatinine 3 3-3 5  Etiology secondary to diabetic nephropathy  Follows with Dr Courtney Freeman  3  Nephrotic Range Proteinuria- likely secondary to diabetic nephropathy, serologic workup previously negative  4   Accelerated Hypertension- BP improving, will continue to manage as an outpatient  - secondary workup negative for primary aldosteronism, pheochromocytoma, and renovascular disease of the right side (left unable to be visualized)  - antihypertensive regimen: labetalol 300mg TID, chlorthalidone 50mg daily, hydralazine 100mg TID, nifedipine 60mg BID, torsemide 20mg BID  - patient aware to avoid salt and check his weights and blood pressures at home  5  Volume Overload- continue with torsemide 20mg BID and chlorthalidone 50mg daily  - may be able to increase if no weight loss as an outpatient  6  Hyperkalemia- low K diet, improved  7  Anemia- no erwin due to recent cva  - iron studies acceptable  - hgb stable at 7 9     Disposition:  Okay from a renal standpoint for discharge  Follow up appointment with DEISI Mcguire on Thursday 2/22  [facility] Wednesday before appointment if discharged

## 2018-02-19 NOTE — PROGRESS NOTES
Pt BP at 2220 194/105  10 Labetalol IV given  Repeat /78  Woke pt from sound sleep to recheck BP  Pt had blood on pillow  Gums on upper left jaw were bleeding  Pt stated that he had not brushed aggressively earlier in the evening  Pt stated that it had not happened before  Will continue to monitor

## 2018-02-20 ENCOUNTER — TELEPHONE (OUTPATIENT)
Dept: NEUROLOGY | Facility: CLINIC | Age: 33
End: 2018-02-20

## 2018-02-20 NOTE — DISCHARGE SUMMARY
Discharge- Katherine Aguilar 1985, 27 y o  male MRN: [id number]    Unit/Bed#: -01 Encounter: [id number]    Primary Care Provider: Kay Javier DO   Date and time admitted to hospital: 2/10/2018  5:21 PM        Chronic kidney disease, stage 4 (severe) ([facility] [address] [facility] )   Assessment & Plan    Creatinine is 3 6, likely the new baseline is about 3 5  Discussed with Nephrology  Patient has an outpatient appointment on Thursday -okay for discharge with the current medication        Anemia in stage 3 chronic kidney disease   Assessment & Plan    Monitor H andH/  Status post packed RBCs  H and H remained stable posttransfusion        History of lacunar cerebrovascular accident (CVA)   Assessment & Plan    Stable continue with the outpatient medication        Type 1 diabetes mellitus with diabetic nephropathy, with long-term current use of insulin [facility]   Assessment & Plan    Patient with known history of type 1 diabetes    Blood sugar acceptable  Continue with the Lantus and Humalog along with sliding scale of insulin  Patient reported that he is comfortable managing his blood sugar as an outpatient        * Hypertensive urgency   Assessment & Plan    Blood pressure appears to be better controlled today  Multiple medication adjustments made during this hospitalization  Nephrology on board  Continue with the current medication                Resolved Problems  Date Reviewed: 2/19/2018          Resolved    Acute renal failure superimposed on stage 3 chronic kidney disease ([facility] [address] ) 2/12/2018     Resolved by  Radonna Lundborg, CRNP    Nausea 1/27/2018     Resolved by  Teena Martin PA-C    Accelerated hypertension 2/12/2018     Resolved by  Radonna Lundborg, CRNP    Acute hyperkalemia 2/12/2018     Resolved by  Radonna Lundborg, CRNP    Leukocytosis (leucocytosis) 2/12/2018     Resolved by  Radonna Lundborg, CRNP    Acute kidney injury ([facility] [address] ) 2/12/2018     Resolved by  Radonna Lundborg, CRNP Consultations During Hospital Stay:  Nephrology  Procedures Performed:       Significant Findings / Test Results:   Renal artery Doppler-no significant stenosis    Chest x-ray-right lower lobe pneumonia  Incidental Findings:       Test Results Pending at Discharge (will require follow up):   ·      Outpatient Tests Requested:  · BMP    Complications:  None    Reason for Admission hypertensive urgency    Hospital Course: Lucinda Bai is a 27 y o  male patient who originally presented to the hospital on 2/10/2018 due to hypertensive urgency  Patient was admitted to intensive care unit with Cardene drip  Cardene drip was gradually weaned off  Patient had multiple medication admit chest min during the stay in the hospital   Patient's blood pressure eventually get is controlled and he will be discharged home with the current medication  Patient also with chronic kidney disease stages for with worsening of creatinine  It appears that the patient's creatinine on the day of discharge is 3 66  His new baseline is closer to 3 5  Patient has an outpatient appointment with Nephrology  Patient also with anemia likely secondary to anemia of chronic kidney disease  Received 1 unit of packed RBCs  Hemoglobin remained stable at 7 9  For details refer to the chart    Please see above list of diagnoses and related plan for additional information  Condition at Discharge: good     Discharge Day Visit / Exam:     Subjective:  Patient seen and examined  Patient feeling very well and agreeable to go home today  Vitals: Blood Pressure: 142/80 (02/19/18 0700)  Pulse: 89 (02/19/18 0700)  Temperature: 98 2 °F (36 8 °C) (02/19/18 0700)  Temp Source: Oral (02/19/18 0700)  Respirations: 20 (02/19/18 0700)  Height: 5' 11" (180 3 cm) (02/13/18 1350)  Weight - Scale: 96 9 kg (213 lb 10 oz) (02/19/18 0600)  SpO2: 99 % (02/19/18 0700)  Exam:   Physical Exam   Constitutional: He appears well-developed and well-nourished  HENT:   Head: Normocephalic and atraumatic  Eyes: EOM are normal  Pupils are equal, round, and reactive to light  Neck: Normal range of motion  Neck supple  Cardiovascular: Normal rate and regular rhythm  No murmur heard  Pulmonary/Chest: No respiratory distress  He has no wheezes  Abdominal: Soft  Musculoskeletal: He exhibits edema  Neurological: He is alert  No cranial nerve deficit  Coordination normal    Skin: Skin is warm and dry  Discussion with Family:  Mother updated in the room    Discharge instructions/Information to patient and family:   See after visit summary for information provided to patient and family  Provisions for Follow-Up Care:  See after visit summary for information related to follow-up care and any pertinent home health orders  Disposition:      Home    For Discharges to [address] [facility]:   · Not Applicable to this Patient - Not Applicable to this Patient    Planned Readmission: none     Discharge Statement:  I spent 45 minutes discharging the patient  This time was spent on the day of discharge  I had direct contact with the patient on the day of discharge  Greater than 50% of the total time was spent examining patient, answering all patient questions, arranging and discussing plan of care with patient as well as directly providing post-discharge instructions  Additional time then spent on discharge activities  Discharge Medications:  See after visit summary for reconciled discharge medications provided to patient and family        ** Please Note: This note has been constructed using a voice recognition system **

## 2018-02-20 NOTE — ASSESSMENT & PLAN NOTE
Patient with known history of type 1 diabetes    Blood sugar acceptable  Continue with the Lantus and Humalog along with sliding scale of insulin  Patient reported that he is comfortable managing his blood sugar as an outpatient

## 2018-02-20 NOTE — ASSESSMENT & PLAN NOTE
Creatinine is 3 6, likely the new baseline is about 3 5  Discussed with Nephrology  Patient has an outpatient appointment on Thursday -okay for discharge with the current medication

## 2018-02-20 NOTE — ASSESSMENT & PLAN NOTE
Blood pressure appears to be better controlled today  Multiple medication adjustments made during this hospitalization  Nephrology on board  Continue with the current medication

## 2018-02-21 ENCOUNTER — OFFICE VISIT (OUTPATIENT)
Dept: PHYSICAL THERAPY | Facility: CLINIC | Age: 33
End: 2018-02-21
Payer: COMMERCIAL

## 2018-02-21 ENCOUNTER — APPOINTMENT (OUTPATIENT)
Dept: LAB | Facility: CLINIC | Age: 33
End: 2018-02-21
Payer: COMMERCIAL

## 2018-02-21 DIAGNOSIS — D63.1 ANEMIA IN STAGE 3 CHRONIC KIDNEY DISEASE (HCC): Chronic | ICD-10-CM

## 2018-02-21 DIAGNOSIS — N17.9 ACUTE RENAL FAILURE SUPERIMPOSED ON STAGE 3 CHRONIC KIDNEY DISEASE, UNSPECIFIED ACUTE RENAL FAILURE TYPE: ICD-10-CM

## 2018-02-21 DIAGNOSIS — I16.0 HYPERTENSIVE URGENCY: ICD-10-CM

## 2018-02-21 DIAGNOSIS — I63.9 CEREBROVASCULAR ACCIDENT (CVA) OF LEFT PONTINE STRUCTURE (HCC): Primary | ICD-10-CM

## 2018-02-21 DIAGNOSIS — N18.4 CHRONIC KIDNEY DISEASE, STAGE 4 (SEVERE) (HCC): ICD-10-CM

## 2018-02-21 DIAGNOSIS — N18.3 ACUTE RENAL FAILURE SUPERIMPOSED ON STAGE 3 CHRONIC KIDNEY DISEASE, UNSPECIFIED ACUTE RENAL FAILURE TYPE: ICD-10-CM

## 2018-02-21 DIAGNOSIS — E10.21 TYPE 1 DIABETES MELLITUS WITH DIABETIC NEPHROPATHY, WITH LONG-TERM CURRENT USE OF INSULIN (HCC): ICD-10-CM

## 2018-02-21 DIAGNOSIS — N18.4 CHRONIC KIDNEY DISEASE, STAGE 4 (SEVERE) (HCC): Chronic | ICD-10-CM

## 2018-02-21 DIAGNOSIS — N18.30 ANEMIA IN STAGE 3 CHRONIC KIDNEY DISEASE (HCC): Chronic | ICD-10-CM

## 2018-02-21 LAB
ALBUMIN SERPL BCP-MCNC: 3.2 G/DL (ref 3.5–5)
ANION GAP SERPL CALCULATED.3IONS-SCNC: 8 MMOL/L (ref 4–13)
BUN SERPL-MCNC: 38 MG/DL (ref 5–25)
CALCIUM SERPL-MCNC: 8.9 MG/DL (ref 8.3–10.1)
CHLORIDE SERPL-SCNC: 108 MMOL/L (ref 100–108)
CO2 SERPL-SCNC: 24 MMOL/L (ref 21–32)
CREAT SERPL-MCNC: 4 MG/DL (ref 0.6–1.3)
ERYTHROCYTE [DISTWIDTH] IN BLOOD BY AUTOMATED COUNT: 13.9 % (ref 11.6–15.1)
GFR SERPL CREATININE-BSD FRML MDRD: 18 ML/MIN/1.73SQ M
GLUCOSE SERPL-MCNC: 232 MG/DL (ref 65–140)
HCT VFR BLD AUTO: 26.2 % (ref 36.5–49.3)
HGB BLD-MCNC: 8.8 G/DL (ref 12–17)
MCH RBC QN AUTO: 30.7 PG (ref 26.8–34.3)
MCHC RBC AUTO-ENTMCNC: 33.6 G/DL (ref 31.4–37.4)
MCV RBC AUTO: 91 FL (ref 82–98)
PHOSPHATE SERPL-MCNC: 4.4 MG/DL (ref 2.7–4.5)
PLATELET # BLD AUTO: 248 THOUSANDS/UL (ref 149–390)
PMV BLD AUTO: 10.1 FL (ref 8.9–12.7)
POTASSIUM SERPL-SCNC: 5.2 MMOL/L (ref 3.5–5.3)
RBC # BLD AUTO: 2.87 MILLION/UL (ref 3.88–5.62)
SODIUM SERPL-SCNC: 140 MMOL/L (ref 136–145)
WBC # BLD AUTO: 4.55 THOUSAND/UL (ref 4.31–10.16)

## 2018-02-21 PROCEDURE — 97112 NEUROMUSCULAR REEDUCATION: CPT

## 2018-02-21 PROCEDURE — 97164 PT RE-EVAL EST PLAN CARE: CPT

## 2018-02-21 PROCEDURE — 85027 COMPLETE CBC AUTOMATED: CPT

## 2018-02-21 PROCEDURE — 80069 RENAL FUNCTION PANEL: CPT

## 2018-02-21 PROCEDURE — 36415 COLL VENOUS BLD VENIPUNCTURE: CPT

## 2018-02-21 NOTE — PROGRESS NOTES
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   Due to concerns for for demyelinating disorder patient underwent a course of plasmapheresis urgency -discharged on 02/05/2018  He was readmitted on 2/10/18 with hypertensive urgency  The patient is a 78-year-old  male with a history of diabetes mellitus type 1, CKD, CVA, hyperhomocystinemia who was  Previously managed by Dr Jennifer Sesay but lost to follow-up  The first CVA, which was July 2015, 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  Beba Johnson was found to be 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 and also heterozygote for prothrombin gene mutation  He was scheduled for subsequent follow-up with hematology oncology but did not follow-up  He also continued smoking cigarettes  In review of previous labs it appears that creatinine baseline was between 2-2 4 as far back as 2015 up until the summer of 2017  During hospitalization from 1/22/18-2/5/18 creatinine peaked at 8 3 and was down to 3 39 at discharge  When patient was readmitted on 2/10/18 creatinine was 4 03 and was down to 3 66 at discharge  Follow-up labs show a creatinine of 4 mg/dL  Beba Johnson underwent significant adjustment of antihypertensives during second hospital visit  He was discharged on labetalol, chlorthalidone, hydralazine, nifedipine and torsemide  I had the pleasure of seeing Beba Johnson  today in the renal clinic for the continued management of CKD  Since our last visit, there has been no ER visits or hospitilizations  He is eating well and sleeping well    He did have one episode of vomiting two days ago after taking his medications in the morning on an empty stomach  Patient denies any chest pain, shortness of breath  Edema has greatly improved  The last blood work was done on 2/21 (yesterday), which we have reviewed together  He is monitoring his blood pressure at home and overall blood pressure is improving  ASSESSMENT and PLAN:   1  Recent Acute kidney injury:  Felt to be related to uncontrolled hypertension In the setting of recent ATN  2  Chronic kidney disease stage 4:  Former baseline creatinine 2 3-2 5  More recently creatinine appeared to plateau in the mid to upper threes  We will likely need to tolerate higher baseline creatinine to maintain euvolemia  Creatinine at discharge 3 66    · CKD likely due to diabetic nephropathy  · Renal ultrasound January 2018: Right kidney 12 6 cm, left kidney 12 4 cm, No hydronephrosis, normal echogenicity  · Urinalysis 1-2 RBCs  3  Nephrotic range proteinuria:  Microalbumin creatinine ratio on 01/23/2018 9281 mg/g   Suspected diabetic nephropathy  · Previous serologic workup negative  · Renal biopsy would likely not change current course/treatment plan  · Follow-up urine protein creatinine ratio at steady state  4  Hypertensive urgency:  At discharge labetalol 300mg TID, chlorthalidone 50mg daily, hydralazine 100mg TID, nifedipine 60mg BID, torsemide 20mg BID  · Continue current medications  Patient appears to be reaching euvolemic therefore I instructed Jermaine Benites in his mother to decrease torsemide to daily dosing when he feels he has reached a natan  ,  · Added- Clonidine 0 1 mg when necessary every eight hours added as rescue for persistently elevated blood pressure greater than 180 mmHg  · Home blood pressure monitor correlated: Diastolic reading 10 mm higher  Systolic reading correlated adequately  Home blood pressure readings generally acceptable  Only two readings have been greater than 180 since discharge and at that time patient was due for medications  Blood pressure remains above goal but is improving with diuresis  Providing a dose of clonidine when necessary as a rescue medicine  · Monitor daily weight  · Renal artery doppler evaluation-no evidence of renovascular disease on the right, unable to visualize left renal artery due to excessive bowel gas  · Aldosterone level less than 1 0, renin 0 288- PAC/PRA ratio 3 47  Which does not support primary aldosteronism  · Metanephrine level within normal limits 36,  normetanephrine level elevated 177  · TSH normal  5  Hyperkalemia:  Resolved   Potassium 4 7 at discharge  Follow-up labs show a potassium level of 5 2  · Likely secondary to renal insufficiency,? Type IV RTA  · Daily diuretic will help with potassium excretion   Also discussed low-potassium diet  6  Anemia:    · No HETAL due to recent CVA  · Patient required transfusion during hospitalization  · Hemoglobin at discharge 7 9  · Iron saturation 28%, ferritin level 222  · Follow-up hematology especially in light of #8  7  Recent CVA left pontine, history of lacunar CVA  8  Hyperhomocystinemia:  Hematology follow-up  9  CKD MBD:  Check phosphorus level    Plan: Follow-up with either Dr Daniel Alarcon or Dr Jonas Yusuf since it is been a number of years since patient has follow-up with Dr Nell Mora and due to recent events would like to stay with the physicians who treated him more recently  PATIENT INSTRUCTIONS:    Patient Instructions   1  Continue meds  2  Labs weekly x 2  3  Return in 3 weeks for follow up  4  Continue home BP monitoring    OBJECTIVE:  Current Weight: Weight - Scale: 97 1 kg (214 lb)  Vitals:    02/22/18 1445 02/22/18 1517 02/22/18 1518 02/22/18 1528   BP:  (!) 188/98 (!) 192/90 (!) 184/104   BP Location:  Right arm     Patient Position:  Sitting     Pulse:    77   Weight: 97 1 kg (214 lb)      Height: 5' 11" (1 803 m)       Body mass index is 29 85 kg/m²        REVIEW OF SYSTEMS:    Review of Systems   Constitutional: Positive for activity change (Rations activity level improving  Less fatigue  Actually is doing better and sleeping well at night ) and unexpected weight change (Weight has declined approximately 20-25 pounds over the span of two hospitalizations)  Negative for appetite change (Eating well), chills and fever  HENT: Negative  Eyes: Negative  Respiratory: Negative  Cardiovascular: Positive for leg swelling  Negative for chest pain  Gastrointestinal: Negative  Genitourinary: Negative  Musculoskeletal: Negative  Skin: Negative  Neurological: Negative  Hematological: Negative  Psychiatric/Behavioral: Negative  PHYSICAL EXAM:      Physical Exam   Constitutional: He is oriented to person, place, and time  He appears well-developed  HENT:   Head: Normocephalic and atraumatic  Eyes: Conjunctivae are normal  Left eye exhibits no discharge  No scleral icterus  Left eye has persistent rightward gaze   Neck: Neck supple  No JVD present  No tracheal deviation present  Cardiovascular: Normal rate, regular rhythm, normal heart sounds and intact distal pulses  Exam reveals no gallop and no friction rub  No murmur heard  Pulmonary/Chest: Effort normal and breath sounds normal    Abdominal: Soft  Bowel sounds are normal  He exhibits no distension and no mass  There is no tenderness  There is no rebound and no guarding  Musculoskeletal: He exhibits edema (Mild lower extremity edema  No upper extremity edema)  Neurological: He is alert and oriented to person, place, and time  Skin: Skin is warm  No rash noted  Psychiatric: He has a normal mood and affect   His behavior is normal  Judgment and thought content normal        Medications:    Current Outpatient Prescriptions:     aspirin 81 mg chewable tablet, Chew 2 tablets (162 mg total) daily, Disp: 30 tablet, Rfl: 0    atorvastatin (LIPITOR) 40 mg tablet, Take 1 tablet (40 mg total) by mouth every evening, Disp: 30 tablet, Rfl: 0    B-D ULTRAFINE III SHORT PEN 31G X 8 MM MISC, , Disp: , Rfl:     chlorthalidone (HYGROTEN) 50 MG tablet, Take 1 tablet (50 mg total) by mouth daily, Disp: 90 tablet, Rfl: 3    clopidogrel (PLAVIX) 75 mg tablet, Take 1 tablet (75 mg total) by mouth daily, Disp: 30 tablet, Rfl: 0    ergocalciferol (VITAMIN D2) 50,000 units, Take 1 capsule (50,000 Units total) by mouth once a week for 10 doses, Disp: 4 capsule, Rfl: 0    escitalopram (LEXAPRO) 10 mg tablet, Take 1 tablet (10 mg total) by mouth daily, Disp: 30 tablet, Rfl: 0    folic acid (FOLVITE) 1 mg tablet, Take 1 tablet (1 mg total) by mouth daily, Disp: 30 tablet, Rfl: 0    hydrALAZINE (APRESOLINE) 100 MG tablet, Take 1 tablet (100 mg total) by mouth 3 (three) times a day, Disp: 270 tablet, Rfl: 3    insulin glargine (LANTUS) 100 units/mL subcutaneous injection, Inject 8 Units under the skin daily at bedtime, Disp: 10 mL, Rfl: 0    insulin glargine (LANTUS) 100 units/mL subcutaneous injection, Inject 8 Units under the skin daily with breakfast, Disp: 10 mL, Rfl: 0    insulin lispro (HumaLOG) 100 units/mL injection, Inject 5 Units under the skin 3 (three) times a day with meals U-100 pen, Disp: 10 mL, Rfl: 0    labetalol (NORMODYNE) 300 mg tablet, Take 1 tablet (300 mg total) by mouth every 8 (eight) hours, Disp: 270 tablet, Rfl: 3    NIFEdipine ER (ADALAT CC) 60 MG 24 hr tablet, Take 1 tablet (60 mg total) by mouth 2 (two) times a day, Disp: 180 tablet, Rfl: 3    ondansetron (ZOFRAN) 4 mg tablet, Take 1 tablet (4 mg total) by mouth every 8 (eight) hours as needed for nausea or vomiting, Disp: 20 tablet, Rfl: 0    torsemide (DEMADEX) 20 mg tablet, Take 1 tablet (20 mg total) by mouth 2 (two) times a day, Disp: 180 tablet, Rfl: 3    cloNIDine (CATAPRES) 0 1 mg tablet, Take 1 tablet (0 1 mg total) by mouth every 8 (eight) hours as needed for high blood pressure (For SBP greater then 180), Disp: 30 tablet, Rfl: 3    Laboratory Results:    Results from last 7 days  Lab Units 02/21/18  1217 02/19/18  0434 02/19/18  0055 02/18/18  1304 02/17/18  1140 02/16/18  0510 02/15/18  1755   WBC Thousand/uL 4 55  --  4 49 4 03* 4 55 5 10  --    HEMOGLOBIN g/dL 8 8*  --  7 9* 8 0* 7 9* 8 1* 8 2*   HEMATOCRIT % 26 2*  --  23 4* 23 9* 23 4* 24 1* 24 6*   PLATELETS Thousands/uL 248  --  159 166 171 177  --    SODIUM mmol/L 140 141  --  137 139 140  --    POTASSIUM mmol/L 5 2 4 7  --  5 1 5 1 4 9  --    CHLORIDE mmol/L 108 108  --  107 107 110*  --    CO2 mmol/L 24 22  --  23 24 20*  --    BUN mg/dL 38* 39*  --  38* 35* 34*  --    CREATININE mg/dL 4 00* 3 66*  --  3 92* 3 60* 3 58*  --    CALCIUM mg/dL 8 9 8 4  --  8 4 8 4 8 5  --    MAGNESIUM mg/dL  --   --   --   --   --  1 9  --    PHOSPHORUS mg/dL 4 4  --   --   --   --   --   --    TOTAL PROTEIN g/dL  --   --   --   --  5 3*  --   --    GLUCOSE RANDOM mg/dL 232* 130  --  260* 303* 110  --

## 2018-02-21 NOTE — PROGRESS NOTES
PT Re-evaluation    Today's date: 2018  Patient name: Yissel Laboy  : 1985  MRN: [id number]  Referring provider: Cayla Grant DO  Dx:   Encounter Diagnoses   Name Primary?  Cerebrovascular accident (CVA) of left pontine structure ([facility] [address] ) Yes    Hypertensive urgency     Chronic kidney disease, stage 4 (severe) (HCC)     Type 1 diabetes mellitus with diabetic nephropathy, with long-term current use of insulin ([facility])        Start Time: 1100  Stop Time: 1200  Total time in clinic (min): 60 minutes    Assessment  Impairments: abnormal coordination, abnormal gait, activity intolerance, impaired balance, impaired physical strength and lacks appropriate home exercise program    Patient is not irritable  Assessment details: Patient is a 27year old male presenting for a re-evaluation following a recent hospitalization due to hypertension  Patient continues to demonstrate impaired coordination, impaired vision, mild impairments in LE strength per MMT and the 5x STS, impaired balance per the Callejas and the mCTSIS, and impaired gait speed and endurance per the 6 MWT  Patient may benefit from continued skilled physical therapy to address the above impairments and facilitate return to daily activities with decreased risk for falls and without limitation     Understanding of Dx/Px/POC: good   Prognosis: good    Goals  STG 1: Patient will complete the Callejas Balance Assessment with a score of 52/56 in 4 weeks - Not met  STG 2: Patient will ambulate 1150 feet during the 6 MWT with no AD and good stability in 4 weeks - not met  STG 3: Patient will complete the 5x STS in <15 seconds with no UE use in 4 weeks - not met    LTG 1: Patient will deny falls or near falls in 8 weeks - not met  LTG 2: Patient will be independent with HEP in 8 weeks - not met      Plan  Patient would benefit from: skilled PT and OT eval  Referral necessary: Yes  Planned therapy interventions: neuromuscular re-education, patient education, balance, therapeutic exercise, therapeutic activities, strengthening, home exercise program, gait training, graded exercise and flexibility  Frequency: 2x week  Duration in visits: 16  Duration in weeks: 8  Treatment plan discussed with: patient and family        Subjective Evaluation    History of Present Illness  Date of onset: 2018  Mechanism of injury: Patient reports that he woke up and was dizzy with visual changes, thought he had a stroke so he went to hospital  Initially thought it was MS but now thinks it was a CVA - on 18  Patient reports his vision has gotten slightly better and reports that he is "getting by" when moving around  He reports some difficulty with balance and continued vision changes  Reports jessi difficulty with ADLs but is doing everything on his own     Not a recurrent problem Quality of life: fair    Pain  No pain reported    Social Support  Steps to enter house: yes  2  Stairs in house: yes (landing in the milddle of the flight)   20  Lives in: multiple-level home  Lives with: parents    Employment status: not working (Previously looking for a job and working for [employer])  Hand dominance: right      Diagnostic Tests  MRI studies: abnormal  CT scan: abnormal        Objective  PT/OT Neuro Exam  Neurologic Exam   Balance Test    6 Minute Walk Test (ft): 1070 feet with no AD   Gait Speed (ft/s): 4 01   5x Sit To Stand (s): 20 99 sec from orange chair, no UE     Gait Assessment:   Decreased trunk rotation, moderate path deviation, wide COLBY, decreased reciprocal arm swing    MCTSIB Number of Seconds   Feet Together, Eyes Open 30   Feet Together, Eyes Closed 30   Feet Together, Eyes Open Foam 18   Feet Together, Eyes Closed Foam unable     Callejas/56    Coordination Left Right   Heel To Shin + +   Rapid Alternating Movement     UE + +   LE + +       Sensation Left Right   Kinesthesia Intact Intact   Light Touch Intact Intact       Muscle Tone Left Right   Modified Joel Scale Hamstring 0 0   Gastroc 0 0   Quad 0 0       Manual Muscle Testing - Hip Left Right   Flexion 4+ 4+   Extension 4 4   Abduction 4 4   External Rotation 4- 4-     Manual Muscle Testing - Knee Left Right   Flexion 5 5   Extension 4 5     Manual Muscle Testing - Ankle Left Right   Doriflexion 4 4   Plantarflexion 2 3        Transfers    Sit To Stand Independent   W/C To Bed Independent   Sit To Supine Independent   Roll Independent       Mild left facial drooping noted, denies difficulty with swallowing or drinking    VOMS (Vestibular/Ocular Motor Screen)-- blurry vision     Resting position: left lateral      Smooth pursuit Abnormal     Saccades (horizontal) Normal, slowed in all directions     Saccades (vertical)  Normal, slowed in all directions     Convergence: Insufficiency - decreased teaming bilaterally       Precautions: CVA with visual disturbances, CKD, DM type 1, HTN, falls    Daily Treatment Diary     Exercise Diary  2/21            TM             Step ups 8" 10x2            FT/EO foam 30"x3            FA/EC foam 30"x3            Tandem gait 2 laps            Semitandem 30"x2            HK marches 15x2            STS feet staggered 10x2            Step taps from foam 8" 20x            Rockerboard taps ML/A  15x ea            HR/TR standing 20xea

## 2018-02-22 ENCOUNTER — OFFICE VISIT (OUTPATIENT)
Dept: NEPHROLOGY | Facility: CLINIC | Age: 33
End: 2018-02-22
Payer: COMMERCIAL

## 2018-02-22 ENCOUNTER — TRANSITIONAL CARE MANAGEMENT (OUTPATIENT)
Dept: INTERNAL MEDICINE CLINIC | Facility: CLINIC | Age: 33
End: 2018-02-22

## 2018-02-22 VITALS
SYSTOLIC BLOOD PRESSURE: 184 MMHG | WEIGHT: 214 LBS | HEART RATE: 77 BPM | DIASTOLIC BLOOD PRESSURE: 104 MMHG | BODY MASS INDEX: 29.96 KG/M2 | HEIGHT: 71 IN

## 2018-02-22 DIAGNOSIS — N18.30 ACUTE RENAL FAILURE WITH ACUTE TUBULAR NECROSIS SUPERIMPOSED ON STAGE 3 CHRONIC KIDNEY DISEASE (HCC): ICD-10-CM

## 2018-02-22 DIAGNOSIS — R79.89 AZOTEMIA: ICD-10-CM

## 2018-02-22 DIAGNOSIS — I10 ACCELERATED HYPERTENSION: ICD-10-CM

## 2018-02-22 DIAGNOSIS — E10.21 TYPE 1 DIABETES MELLITUS WITH DIABETIC NEPHROPATHY, WITH LONG-TERM CURRENT USE OF INSULIN (HCC): ICD-10-CM

## 2018-02-22 DIAGNOSIS — E72.11 HYPERHOMOCYSTEINEMIA (HCC): ICD-10-CM

## 2018-02-22 DIAGNOSIS — G35 OPTIC NEURITIS DUE TO MULTIPLE SCLEROSIS (HCC): ICD-10-CM

## 2018-02-22 DIAGNOSIS — Z86.73 HISTORY OF LACUNAR CEREBROVASCULAR ACCIDENT (CVA): ICD-10-CM

## 2018-02-22 DIAGNOSIS — I63.9 CEREBROVASCULAR ACCIDENT (CVA), UNSPECIFIED MECHANISM (HCC): ICD-10-CM

## 2018-02-22 DIAGNOSIS — N18.2 ACUTE RENAL FAILURE WITH ACUTE TUBULAR NECROSIS SUPERIMPOSED ON STAGE 2 CHRONIC KIDNEY DISEASE (HCC): ICD-10-CM

## 2018-02-22 DIAGNOSIS — H53.8 BLURRED VISION: ICD-10-CM

## 2018-02-22 DIAGNOSIS — N17.9 ACUTE RENAL FAILURE SUPERIMPOSED ON STAGE 3 CHRONIC KIDNEY DISEASE, UNSPECIFIED ACUTE RENAL FAILURE TYPE: ICD-10-CM

## 2018-02-22 DIAGNOSIS — R80.1 PERSISTENT PROTEINURIA: ICD-10-CM

## 2018-02-22 DIAGNOSIS — N17.0 ACUTE RENAL FAILURE WITH ACUTE TUBULAR NECROSIS SUPERIMPOSED ON STAGE 3 CHRONIC KIDNEY DISEASE (HCC): ICD-10-CM

## 2018-02-22 DIAGNOSIS — E83.39 HYPERPHOSPHATEMIA: ICD-10-CM

## 2018-02-22 DIAGNOSIS — N17.0 ACUTE RENAL FAILURE WITH ACUTE TUBULAR NECROSIS SUPERIMPOSED ON STAGE 2 CHRONIC KIDNEY DISEASE (HCC): ICD-10-CM

## 2018-02-22 DIAGNOSIS — I63.9 CEREBROVASCULAR ACCIDENT (CVA) OF LEFT PONTINE STRUCTURE (HCC): ICD-10-CM

## 2018-02-22 DIAGNOSIS — I16.0 HYPERTENSIVE URGENCY: ICD-10-CM

## 2018-02-22 DIAGNOSIS — E55.9 VITAMIN D DEFICIENCY: ICD-10-CM

## 2018-02-22 DIAGNOSIS — H46.9 OPTIC NEURITIS DUE TO MULTIPLE SCLEROSIS (HCC): ICD-10-CM

## 2018-02-22 DIAGNOSIS — N18.3 ACUTE RENAL FAILURE SUPERIMPOSED ON STAGE 3 CHRONIC KIDNEY DISEASE, UNSPECIFIED ACUTE RENAL FAILURE TYPE: ICD-10-CM

## 2018-02-22 DIAGNOSIS — H51.0 BINOCULAR VISION DISORDER WITH CONJUGATE GAZE PALSY: ICD-10-CM

## 2018-02-22 DIAGNOSIS — R11.0 NAUSEA: ICD-10-CM

## 2018-02-22 DIAGNOSIS — H53.30 BINOCULAR VISUAL DISTURBANCE: ICD-10-CM

## 2018-02-22 DIAGNOSIS — I10 ESSENTIAL HYPERTENSION: ICD-10-CM

## 2018-02-22 PROCEDURE — TCMPR: Performed by: INTERNAL MEDICINE

## 2018-02-22 PROCEDURE — 99214 OFFICE O/P EST MOD 30 MIN: CPT | Performed by: NURSE PRACTITIONER

## 2018-02-22 RX ORDER — HYDRALAZINE HYDROCHLORIDE 100 MG/1
100 TABLET, FILM COATED ORAL 3 TIMES DAILY
Qty: 270 TABLET | Refills: 3 | Status: SHIPPED | OUTPATIENT
Start: 2018-02-22 | End: 2018-02-26 | Stop reason: SDUPTHER

## 2018-02-22 RX ORDER — TORSEMIDE 20 MG/1
20 TABLET ORAL
Qty: 180 TABLET | Refills: 3
Start: 2018-02-22 | End: 2018-02-26 | Stop reason: SDUPTHER

## 2018-02-22 RX ORDER — NIFEDIPINE 60 MG/1
60 TABLET, FILM COATED, EXTENDED RELEASE ORAL 2 TIMES DAILY
Qty: 180 TABLET | Refills: 3
Start: 2018-02-22 | End: 2018-02-26 | Stop reason: SDUPTHER

## 2018-02-22 RX ORDER — LABETALOL 300 MG/1
300 TABLET, FILM COATED ORAL EVERY 8 HOURS SCHEDULED
Qty: 270 TABLET | Refills: 3 | Status: SHIPPED | OUTPATIENT
Start: 2018-02-22 | End: 2018-02-26 | Stop reason: SDUPTHER

## 2018-02-22 RX ORDER — CLONIDINE HYDROCHLORIDE 0.1 MG/1
0.1 TABLET ORAL EVERY 8 HOURS PRN
Qty: 30 TABLET | Refills: 3 | Status: SHIPPED | OUTPATIENT
Start: 2018-02-22 | End: 2018-02-26 | Stop reason: SDUPTHER

## 2018-02-22 RX ORDER — CHLORTHALIDONE 50 MG/1
50 TABLET ORAL DAILY
Qty: 90 TABLET | Refills: 3 | Status: SHIPPED | OUTPATIENT
Start: 2018-02-22 | End: 2018-02-26 | Stop reason: SDUPTHER

## 2018-02-22 NOTE — PATIENT INSTRUCTIONS
1  Continue meds  2  Labs weekly x 2  3  Return in 3 weeks for follow up  4  Continue home BP monitoring As discussed  5   Low potassium diet

## 2018-02-23 NOTE — CASE MANAGEMENT
Notification of Discharge  This is a Notification of Discharge from our facility [address]  Please be advised that this patient has been discharge from our facility  Below you will find the admission and discharge date and time including the patients disposition  PRESENTATION DATE: 2/10/2018  5:21 PM  IP ADMISSION DATE: 2/10/18 1845  DISCHARGE DATE: 2/19/2018  4:21 PM  DISPOSITION: Home/Self Care    [address] [facility] in the [facility] by Rickie Carcamo for 2017  Network Utilization Review Department  Phone: [phone]; Fax [phone]  ATTENTION: The Network Utilization Review Department is now centralized for our 7 Facilities  Make a note that we have a new phone and fax numbers for our Department  Please call with any questions or concerns to [phone] and carefully follow the prompts so that you are directed to the right person  All voicemails are confidential  Fax any determinations, approvals, denials, and requests for initial or continue stay review clinical to [phone]  Due to HIGH CALL volume, it would be easier if you could please send faxed requests to expedite your requests and in part, help us provide discharge notifications faster

## 2018-02-26 ENCOUNTER — OFFICE VISIT (OUTPATIENT)
Dept: PHYSICAL THERAPY | Facility: CLINIC | Age: 33
End: 2018-02-26
Payer: COMMERCIAL

## 2018-02-26 DIAGNOSIS — G35 OPTIC NEURITIS DUE TO MULTIPLE SCLEROSIS (HCC): ICD-10-CM

## 2018-02-26 DIAGNOSIS — I10 ACCELERATED HYPERTENSION: ICD-10-CM

## 2018-02-26 DIAGNOSIS — N17.0 ACUTE RENAL FAILURE WITH ACUTE TUBULAR NECROSIS SUPERIMPOSED ON STAGE 2 CHRONIC KIDNEY DISEASE (HCC): ICD-10-CM

## 2018-02-26 DIAGNOSIS — I10 ESSENTIAL HYPERTENSION: ICD-10-CM

## 2018-02-26 DIAGNOSIS — Z86.73 HISTORY OF LACUNAR CEREBROVASCULAR ACCIDENT (CVA): ICD-10-CM

## 2018-02-26 DIAGNOSIS — R80.1 PERSISTENT PROTEINURIA: ICD-10-CM

## 2018-02-26 DIAGNOSIS — E72.11 HYPERHOMOCYSTEINEMIA (HCC): ICD-10-CM

## 2018-02-26 DIAGNOSIS — N17.9 ACUTE RENAL FAILURE SUPERIMPOSED ON STAGE 3 CHRONIC KIDNEY DISEASE, UNSPECIFIED ACUTE RENAL FAILURE TYPE: ICD-10-CM

## 2018-02-26 DIAGNOSIS — H46.9 OPTIC NEURITIS DUE TO MULTIPLE SCLEROSIS (HCC): ICD-10-CM

## 2018-02-26 DIAGNOSIS — I16.0 HYPERTENSIVE URGENCY: ICD-10-CM

## 2018-02-26 DIAGNOSIS — I63.9 CEREBROVASCULAR ACCIDENT (CVA) OF LEFT PONTINE STRUCTURE (HCC): ICD-10-CM

## 2018-02-26 DIAGNOSIS — N18.30 ACUTE RENAL FAILURE WITH ACUTE TUBULAR NECROSIS SUPERIMPOSED ON STAGE 3 CHRONIC KIDNEY DISEASE (HCC): ICD-10-CM

## 2018-02-26 DIAGNOSIS — E83.39 HYPERPHOSPHATEMIA: ICD-10-CM

## 2018-02-26 DIAGNOSIS — R11.0 NAUSEA: ICD-10-CM

## 2018-02-26 DIAGNOSIS — I63.9 CEREBROVASCULAR ACCIDENT (CVA), UNSPECIFIED MECHANISM (HCC): ICD-10-CM

## 2018-02-26 DIAGNOSIS — N18.2 ACUTE RENAL FAILURE WITH ACUTE TUBULAR NECROSIS SUPERIMPOSED ON STAGE 2 CHRONIC KIDNEY DISEASE (HCC): ICD-10-CM

## 2018-02-26 DIAGNOSIS — H53.8 BLURRED VISION: ICD-10-CM

## 2018-02-26 DIAGNOSIS — H53.30 BINOCULAR VISUAL DISTURBANCE: ICD-10-CM

## 2018-02-26 DIAGNOSIS — R79.89 AZOTEMIA: ICD-10-CM

## 2018-02-26 DIAGNOSIS — E55.9 VITAMIN D DEFICIENCY: ICD-10-CM

## 2018-02-26 DIAGNOSIS — N17.0 ACUTE RENAL FAILURE WITH ACUTE TUBULAR NECROSIS SUPERIMPOSED ON STAGE 3 CHRONIC KIDNEY DISEASE (HCC): ICD-10-CM

## 2018-02-26 DIAGNOSIS — H51.0 BINOCULAR VISION DISORDER WITH CONJUGATE GAZE PALSY: ICD-10-CM

## 2018-02-26 DIAGNOSIS — I63.9 CEREBROVASCULAR ACCIDENT (CVA) OF LEFT PONTINE STRUCTURE (HCC): Primary | ICD-10-CM

## 2018-02-26 DIAGNOSIS — N18.3 ACUTE RENAL FAILURE SUPERIMPOSED ON STAGE 3 CHRONIC KIDNEY DISEASE, UNSPECIFIED ACUTE RENAL FAILURE TYPE: ICD-10-CM

## 2018-02-26 DIAGNOSIS — E10.21 TYPE 1 DIABETES MELLITUS WITH DIABETIC NEPHROPATHY, WITH LONG-TERM CURRENT USE OF INSULIN (HCC): ICD-10-CM

## 2018-02-26 PROCEDURE — 97112 NEUROMUSCULAR REEDUCATION: CPT | Performed by: PHYSICAL THERAPIST

## 2018-02-26 PROCEDURE — 97110 THERAPEUTIC EXERCISES: CPT | Performed by: PHYSICAL THERAPIST

## 2018-02-26 RX ORDER — TORSEMIDE 20 MG/1
20 TABLET ORAL DAILY
Qty: 180 TABLET | Refills: 3 | Status: SHIPPED | OUTPATIENT
Start: 2018-02-26 | End: 2018-03-12 | Stop reason: SDUPTHER

## 2018-02-26 RX ORDER — CHLORTHALIDONE 50 MG/1
50 TABLET ORAL DAILY
Qty: 30 TABLET | Refills: 5 | Status: SHIPPED | OUTPATIENT
Start: 2018-02-26 | End: 2018-03-19 | Stop reason: SDUPTHER

## 2018-02-26 RX ORDER — NIFEDIPINE 60 MG/1
60 TABLET, FILM COATED, EXTENDED RELEASE ORAL 2 TIMES DAILY
Qty: 180 TABLET | Refills: 3 | Status: SHIPPED | OUTPATIENT
Start: 2018-02-26 | End: 2018-04-12 | Stop reason: SDUPTHER

## 2018-02-26 RX ORDER — CLONIDINE HYDROCHLORIDE 0.1 MG/1
0.1 TABLET ORAL EVERY 8 HOURS PRN
Qty: 90 TABLET | Refills: 3 | Status: SHIPPED | OUTPATIENT
Start: 2018-02-26 | End: 2018-05-01 | Stop reason: ALTCHOICE

## 2018-02-26 RX ORDER — LABETALOL 300 MG/1
300 TABLET, FILM COATED ORAL EVERY 8 HOURS SCHEDULED
Qty: 270 TABLET | Refills: 3 | Status: SHIPPED | OUTPATIENT
Start: 2018-02-26 | End: 2018-03-19 | Stop reason: SDUPTHER

## 2018-02-26 RX ORDER — HYDRALAZINE HYDROCHLORIDE 100 MG/1
100 TABLET, FILM COATED ORAL 3 TIMES DAILY
Qty: 270 TABLET | Refills: 3 | Status: SHIPPED | OUTPATIENT
Start: 2018-02-26 | End: 2018-03-12 | Stop reason: SDUPTHER

## 2018-02-26 RX ORDER — ERGOCALCIFEROL 1.25 MG/1
50000 CAPSULE ORAL WEEKLY
Qty: 8 CAPSULE | Refills: 0 | Status: SHIPPED | OUTPATIENT
Start: 2018-02-26 | End: 2018-05-01 | Stop reason: ALTCHOICE

## 2018-02-26 NOTE — PROGRESS NOTES
Daily Note     Today's date: 2018  Patient name: Massimo Jimenez  : 1985  MRN: [id number]  Referring provider: Zoila Araiza DO  Dx:   Encounter Diagnosis     ICD-10-CM    1  Cerebrovascular accident (CVA) of left pontine structure [facility]) [address]               PT Re-evaluation    Today's date: 2018  Patient name: Massimo Jimenez  : 1985  MRN: [id number]  Referring provider: Zoila Araiza DO  Dx:   Encounter Diagnosis   Name Primary?  Cerebrovascular accident (CVA) of left pontine structure ([address] ) Yes                  Assessment  Impairments: abnormal coordination, abnormal gait, activity intolerance, impaired balance, impaired physical strength and lacks appropriate home exercise program    Patient is not irritable  Assessment details: Patient is a 27year old male presenting for a re-evaluation following a recent hospitalization due to hypertension  Patient continues to demonstrate impaired coordination, impaired vision, mild impairments in LE strength per MMT and the 5x STS, impaired balance per the Callejas and the mCTSIS, and impaired gait speed and endurance per the 6 MWT  Patient may benefit from continued skilled physical therapy to address the above impairments and facilitate return to daily activities with decreased risk for falls and without limitation     Understanding of Dx/Px/POC: good   Prognosis: good    Goals  STG 1: Patient will complete the Callejas Balance Assessment with a score of 52/56 in 4 weeks - Not met  STG 2: Patient will ambulate 1150 feet during the 6 MWT with no AD and good stability in 4 weeks - not met  STG 3: Patient will complete the 5x STS in <15 seconds with no UE use in 4 weeks - not met    LTG 1: Patient will deny falls or near falls in 8 weeks - not met  LTG 2: Patient will be independent with HEP in 8 weeks - not met      Plan  Patient would benefit from: skilled PT and OT eval  Referral necessary: Yes  Planned therapy interventions: neuromuscular re-education, patient education, balance, therapeutic exercise, therapeutic activities, strengthening, home exercise program, gait training, graded exercise and flexibility  Frequency: 2x week  Duration in visits: 16  Duration in weeks: 8  Treatment plan discussed with: patient and family        Subjective Evaluation    History of Present Illness  Date of onset: 2018  Mechanism of injury: Patient reports that he woke up and was dizzy with visual changes, thought he had a stroke so he went to hospital  Initially thought it was MS but now thinks it was a CVA - on 18  Patient reports his vision has gotten slightly better and reports that he is "getting by" when moving around  He reports some difficulty with balance and continued vision changes  Reports jessi difficulty with ADLs but is doing everything on his own     Not a recurrent problem Quality of life: fair    Pain  No pain reported    Social Support  Steps to enter house: yes  2  Stairs in house: yes (landing in the milddle of the flight)   20  Lives in: multiple-level home  Lives with: parents    Employment status: not working (Previously looking for a job and working for [employer])  Hand dominance: right      Diagnostic Tests  MRI studies: abnormal  CT scan: abnormal        Objective  PT/OT Neuro Exam  Neurologic Exam   Balance Test    6 Minute Walk Test (ft): 1070 feet with no AD   Gait Speed (ft/s): 4 01   5x Sit To Stand (s): 20 99 sec from orange chair, no UE     Gait Assessment:   Decreased trunk rotation, moderate path deviation, wide COLBY, decreased reciprocal arm swing    MCTSIB Number of Seconds   Feet Together, Eyes Open 30   Feet Together, Eyes Closed 30   Feet Together, Eyes Open Foam 18   Feet Together, Eyes Closed Foam unable     Callejas/56    Coordination Left Right   Heel To Shin + +   Rapid Alternating Movement     UE + +   LE + +       Sensation Left Right   Kinesthesia Intact Intact   Light Touch Intact Intact       Muscle Tone Left Right Modified Joel Scale     Hamstring 0 0   Gastroc 0 0   Quad 0 0       Manual Muscle Testing - Hip Left Right   Flexion 4+ 4+   Extension 4 4   Abduction 4 4   External Rotation 4- 4-     Manual Muscle Testing - Knee Left Right   Flexion 5 5   Extension 4 5     Manual Muscle Testing - Ankle Left Right   Doriflexion 4 4   Plantarflexion 2 3        Transfers    Sit To Stand Independent   W/C To Bed Independent   Sit To Supine Independent   Roll Independent       Mild left facial drooping noted, denies difficulty with swallowing or drinking    VOMS (Vestibular/Ocular Motor Screen)-- blurry vision     Resting position: left lateral      Smooth pursuit Abnormal     Saccades (horizontal) Normal, slowed in all directions     Saccades (vertical)  Normal, slowed in all directions     Convergence: Insufficiency - decreased teaming bilaterally       Precautions: CVA with visual disturbances, CKD, DM type 1, HTN, falls    Daily Treatment Diary     Exercise Diary  2/21            TM             Step ups 8" 10x2            FT/EO foam 30"x3            FA/EC foam 30"x3            Tandem gait 2 laps            Semitandem 30"x2            HK marches 15x2            STS feet staggered 10x2            Step taps from foam 8" 20x            Rockerboard taps ML/A  15x ea            HR/TR standing 20xea                                                                                                                                          Subjective: ***      Objective: See treatment diary below      Assessment: Tolerated treatment {Tolerated treatment :2700739175}   Patient {assessment:2100001011}      Plan: {PLAN:[id number]}

## 2018-02-26 NOTE — PROGRESS NOTES
Daily Note     Today's date: 2018  Patient name: Félix Steel  : 1985  MRN: [id number]  Referring provider: Lisseth Mulligan DO  Dx: No diagnosis found  Subjective: ***      Objective: See treatment diary below     Exercise Diary                       TM                       Step ups 8" 10x2                     FT/EO foam 30"x3                     FA/EC foam 30"x3                     Tandem gait 2 laps                     Semitandem 30"x2                     HK marches 15x2                     STS feet staggered 10x2                     Step taps from foam 8" 20x                     Rockerboard taps ML/A  15x ea                     HR/TR standing 20xea                                                                                                                                                                                                                                                       Assessment: Tolerated treatment {Tolerated treatment :0672596679}   Patient {assessment:[id number]}      Plan: {PLAN:2387633993}

## 2018-02-26 NOTE — PROGRESS NOTES
Daily Note     Today's date: 2018  Patient name: Huey Quispe  : 1985  MRN: [id number]  Referring provider: Joshua Lombardi DO  Dx:   Encounter Diagnosis     ICD-10-CM    1  Cerebrovascular accident (CVA) of left pontine structure ([address] ) I63 50                   Subjective: Offered no complaints of prior to session      Objective: See treatment diary below  Precautions: Blind, diabetes, CVA    Daily Treatment Diary       Exercise Diary  18       Bike 15 min       Treadmill        FTEO  airex 30"x3       FAEC airex 30"x3       Tandem gait        Semi-tandem stance 30"x2       HK marches 10ft // bars 4 laps no UE       STS feet staggered        Step taps from foam        Rockerboard taps A/P 15x - occ use of Ue  M/L       HR/TR standing 20x ea                                                                                        Assessment: Required  Frequent seated rest break due to feelings of nausea, reporting his blood sugar was high this am, took insuline, but was still high prior to coming to therapy  Challenged with balance activities continue to progress as able      Plan: Progress treatment as tolerated

## 2018-02-27 ENCOUNTER — OFFICE VISIT (OUTPATIENT)
Dept: INTERNAL MEDICINE CLINIC | Facility: CLINIC | Age: 33
End: 2018-02-27
Payer: COMMERCIAL

## 2018-02-27 ENCOUNTER — OFFICE VISIT (OUTPATIENT)
Dept: OCCUPATIONAL THERAPY | Facility: CLINIC | Age: 33
End: 2018-02-27
Payer: COMMERCIAL

## 2018-02-27 ENCOUNTER — OFFICE VISIT (OUTPATIENT)
Dept: NEUROLOGY | Facility: CLINIC | Age: 33
End: 2018-02-27
Payer: COMMERCIAL

## 2018-02-27 VITALS
OXYGEN SATURATION: 98 % | WEIGHT: 212 LBS | HEIGHT: 71 IN | SYSTOLIC BLOOD PRESSURE: 114 MMHG | BODY MASS INDEX: 29.68 KG/M2 | TEMPERATURE: 97.4 F | RESPIRATION RATE: 14 BRPM | DIASTOLIC BLOOD PRESSURE: 80 MMHG | HEART RATE: 83 BPM

## 2018-02-27 VITALS
HEIGHT: 70 IN | HEART RATE: 66 BPM | WEIGHT: 212.7 LBS | RESPIRATION RATE: 18 BRPM | SYSTOLIC BLOOD PRESSURE: 162 MMHG | BODY MASS INDEX: 30.45 KG/M2 | DIASTOLIC BLOOD PRESSURE: 82 MMHG

## 2018-02-27 DIAGNOSIS — N18.4 CHRONIC KIDNEY DISEASE, STAGE 4 (SEVERE) (HCC): Chronic | ICD-10-CM

## 2018-02-27 DIAGNOSIS — E10.21 TYPE 1 DIABETES MELLITUS WITH DIABETIC NEPHROPATHY, WITH LONG-TERM CURRENT USE OF INSULIN (HCC): Chronic | ICD-10-CM

## 2018-02-27 DIAGNOSIS — I63.9 CEREBROVASCULAR ACCIDENT (CVA) OF LEFT PONTINE STRUCTURE (HCC): Chronic | ICD-10-CM

## 2018-02-27 DIAGNOSIS — H51.0 BINOCULAR VISION DISORDER WITH CONJUGATE GAZE PALSY: ICD-10-CM

## 2018-02-27 DIAGNOSIS — I63.9 CEREBROVASCULAR ACCIDENT (CVA) OF LEFT PONTINE STRUCTURE (HCC): Primary | ICD-10-CM

## 2018-02-27 DIAGNOSIS — H51.0 BINOCULAR VISION DISORDER WITH CONJUGATE GAZE PALSY: Chronic | ICD-10-CM

## 2018-02-27 DIAGNOSIS — E72.11 HYPERHOMOCYSTEINEMIA (HCC): Chronic | ICD-10-CM

## 2018-02-27 DIAGNOSIS — I16.0 HYPERTENSIVE URGENCY: Primary | ICD-10-CM

## 2018-02-27 DIAGNOSIS — I63.9 CEREBROVASCULAR ACCIDENT (CVA), UNSPECIFIED MECHANISM (HCC): Primary | ICD-10-CM

## 2018-02-27 DIAGNOSIS — I51.7 LEFT ATRIAL DILATION: ICD-10-CM

## 2018-02-27 PROCEDURE — 97166 OT EVAL MOD COMPLEX 45 MIN: CPT | Performed by: OCCUPATIONAL THERAPIST

## 2018-02-27 PROCEDURE — G8991 OTHER PT/OT GOAL STATUS: HCPCS | Performed by: OCCUPATIONAL THERAPIST

## 2018-02-27 PROCEDURE — 99215 OFFICE O/P EST HI 40 MIN: CPT | Performed by: NURSE PRACTITIONER

## 2018-02-27 PROCEDURE — G8990 OTHER PT/OT CURRENT STATUS: HCPCS | Performed by: OCCUPATIONAL THERAPIST

## 2018-02-27 PROCEDURE — 99214 OFFICE O/P EST MOD 30 MIN: CPT | Performed by: INTERNAL MEDICINE

## 2018-02-27 RX ORDER — PEN NEEDLE, DIABETIC 31 GX5/16"
NEEDLE, DISPOSABLE MISCELLANEOUS 4 TIMES DAILY
Qty: 400 EACH | Refills: 5 | Status: SHIPPED | OUTPATIENT
Start: 2018-02-27 | End: 2018-08-09 | Stop reason: SDUPTHER

## 2018-02-27 NOTE — ASSESSMENT & PLAN NOTE
Noted on echo  Will send him to cardiology for further eval, may need SANJEEV or possible loop recorder given history of multiple strokes and prothrombin gene mutation  May need eventual full AC

## 2018-02-27 NOTE — PATIENT INSTRUCTIONS
Continue to monitor and control secondary stroke risk factors (BP, blood sugar, cholesterol)-I will defer management of these issues to your family doctor and nephrologist     So proud of you for quitting smoking! Keep up the good work  For now, continue aspirin and plavix  You can cut down to aspirin 81mg  We are checking labs to see which medication will be the best option going forward; however, I will let hematology make the final decision in this as you may require full anticoagulation due to the prothrombin gene mutation  We are also checking labs to rule out an accompanying autoimmune issues, which could also play a role in your strokes  Repeat MRI of the brain in May  Schedule an appt with Dr Lc Gerard (neuro ophthalmologist)      Schedule an appt with cardiology for possible trans-esophageal echocardiogram vs loop recorder given hx of multiple strokes, prothrombin gene mutation, and dilated atrium on echocardiogram

## 2018-02-27 NOTE — PROGRESS NOTES
Patient ID: Alondra Lewis is a 27 y o  male  Assessment/Plan:    Cerebrovascular accident (CVA) of left pontine structure ([facility] [address] )  With conjugate gaze palsy, which per pt is resolving  He continues to have balance issues related to this  He is getting PT/OT but would also benefit from seeing Dr Dot Erazo  He is managing his secondary stroke risks-his BP is labile but is being managed by nephrology  He reports good control of his blood sugars  He continues on lipitor as well as aspirin and statin  He has also quit smoking  I do not feel that he needs dual anti-platelets, and have also advised him that he can decrease his aspirin to 81mg daily  I will check API and P2Y12 to determine which would be most efficacious  He has a history of a prothrombin gene mutation, and there is a question of whether he would require full Ac  In the hospital, hematology did not feel that this was necessary  He is to see hematology on 3/5 and I will defer this decision to them  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,    Improving with some ability for left lateral movement now  Will refer to Dr Dot Erazo  Hyperhomocysteinemia ([facility] [address] )  Continues on folic acid  Will re-check levels  Is also followed by hematology  Left atrial dilation  Noted on echo  Will send him to cardiology for further eval, may need SANJEEV or possible loop recorder given history of multiple strokes and prothrombin gene mutation  May need eventual full AC  Type 1 diabetes mellitus with diabetic nephropathy, with long-term current use of insulin (HCC)  Not currently followed by endocrine-managed by PCP  Pt noting good control overall  Last A1c 6 4  Given this history, there is some concern for other autoimmune diseases concurrently  Will check sed rate, CRP, CARMEN, SPEP, ANCAS to eval and will refer to rheumatology as appropriate  Subjective:     Alondra Lewis is a right handed  27 y o  male who  has seen Dr Hortencia Ford of our stroke team as an outpatient in the office in September of 2015 after the patient experienced a stroke while he was visiting friends  Had a small cerebellar infarct in July of 2015 with known stroke risk factors of smoking and an undiagnosed hypertension with systolics in the 528 and diabetes as he is a type 1 diabetic since the age of 1  When he saw Dr Olga Mueller in the office he was encouraged to maintain his blood pressure medications as well as to maintain his tobacco abstinence and maintain on his aspirin  We had also asked him to have a hematology oncology consult regarding 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 MTHFR and heterozygosity for PTH gene mutation  Apparently that did not occur  Patient and his mother reports that in the meantime has gotten back to life generally pretty well  He is driving again he reports he generally has much better balance and he had when he 1st recovered  It is in the context of this that the patient reports he has been taking his medications generally most of the time he remembers  He presented to the ER on 1/22/18 with new complaints of dizziness and nausea  He reports his vision was also a blurry but he did not endorse any particular headache  In the emergency room his blood pressure was noted to be 584 systolically  His stroke alert score was very low at only 2 as a result of his gaze palsy  CT and CTA were negative  He was loaded with Plavix and started on Plavix 75mg, aspirin 325mg, and Lipitor 80mg  MRI was done and noted one pontine lesion felt to be new ischemia  EEG was negative for any seizure activity  MRI was noted for questionable enhancing lesions, concern for MS vs  other demyelinating process  On 1/24 he developed left sided facial weakness/droop and the MRI was repeated but without contrast due to acute kidney injury    Results showed Similar-appearing small focus of subacute infarct along the posterior aspect of the velia near the facial colliculus (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)  MRI of the C-spine was negative for evidence of MS, MRI of the T-spine was inconclusive due to movement  LP was done and negative for MS-no oligoclonal bands  He was given 3 days of high dose IV steroids, but without improvement of his gaze palsy  He was then started on plasmapharesis, which also did not improve his symptoms  A1c 6 4, paraneoplastic panel, Lyme, herpes negative, homocysteine level 17 4  Echo showed EF of 60-65% with mildly dilated atrium  He was continued on dual antiplatelets, Lipitor, insulin, and BP meds  Heme-onc was consulted and did not feel that full ac was required, given he has had no other events such as DVT/PE, and that anti-platelet with secondary stroke prevention would be sufficient  He has an apt with them on 3/5  Also of note, he was hospitalized with hypertensive urgency from 2/10-2/19  Today Chanell Dumas presents for follow-up with his mom  He states that things are going well, he feels that his vision is improving  He denies headaches, vision changes, numbness or tingling, speech or swallowing issues  He does have some balance issues but denies any falls  He is eating and sleeping well  He quit smoking  His blood pressure can be labile, but he is followed by nephrology for management  He is taking his blood pressure medications, lipitor, aspirin, plavix, and insulin as prescribed  He states his blood sugars are well controlled  Objective:    Blood pressure 162/82, pulse 66, resp  rate 18, height 5' 10" (1 778 m), weight 96 5 kg (212 lb 11 2 oz)  Physical Exam   Constitutional: He appears well-developed and well-nourished  HENT:   Head: Normocephalic  Eyes: Pupils are equal, round, and reactive to light  Neurological: He has normal strength and normal reflexes  Gait normal    Psychiatric: He has a normal mood and affect  His speech is normal and behavior is normal    Vitals reviewed        Neurological Exam    Mental Status  The patient is alert and oriented to person, place, time, and situation  His speech is normal  He has normal attention span and concentration  He follows multi-step commands  He has a normal fund of knowledge  Cranial Nerves    CN II: The patient's visual acuity and visual fields are normal   CN III, IV, VI: The patient's pupils are equally round and reactive to light  The patient's pupils have conjugate gaze palsy  CN V: The patient has normal facial sensation  CN VII:  The patient has no right-sided facial weakness  The patient has lower left-sided facial weakness  CN VIII:  The patient's hearing is normal   CN IX, X: The patient has symmetric palate movement and normal gag reflex  CN XI: The patient's shoulder shrug strength is normal   CN XII: The patient's tongue is midline without atrophy or fasciculations  Motor   His strength is 5/5 throughout all four extremities  Sensory  The patient's sensation is to light touch  Reflexes  Deep tendon reflexes are 2+ and symmetric in all four extremities with downgoing toes bilaterally  Gait and Coordination  The patient has normal gait and station  ROS:    Review of Systems   Constitutional: Positive for fatigue  HENT: Negative  Eyes: Positive for visual disturbance  Respiratory: Negative  Cardiovascular: Negative  Gastrointestinal: Positive for nausea  Endocrine: Negative  Genitourinary: Negative  Musculoskeletal: Negative  Skin: Negative  Allergic/Immunologic: Negative  Neurological: Positive for light-headedness  Hematological: Negative  Psychiatric/Behavioral: Negative

## 2018-02-27 NOTE — ASSESSMENT & PLAN NOTE
Not currently followed by endocrine-managed by PCP  Pt noting good control overall  Last A1c 6 4  Given this history, there is some concern for other autoimmune diseases concurrently  Will check sed rate, CRP, CARMEN, SPEP, ANCAS to eval and will refer to rheumatology as appropriate

## 2018-02-27 NOTE — PROGRESS NOTES
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   Due to concerns for for demyelinating disorder patient underwent a course of plasmapheresis urgency -discharged on 02/05/2018  He was readmitted on 2/10/18 with hypertensive urgency  The patient is a 68-year-old  male with a history of diabetes mellitus type 1, CKD, CVA, hyperhomocystinemia who was  Previously managed by Dr Juan Manuel Chaparro but lost to follow-up  The first CVA, which was July 2015, 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  Frances Hernandez was found to be 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 and also heterozygote for prothrombin gene mutation  He was scheduled for subsequent follow-up with hematology oncology but did not follow-up  He also continued smoking cigarettes  In review of previous labs it appears that creatinine baseline was between 2-2 4 as far back as 2015 up until the summer of 2017  During hospitalization from 1/22/18-2/5/18 creatinine peaked at 8 3 and was down to 3 39 at discharge  When patient was readmitted on 2/10/18 creatinine was 4 03 and was down to 3 66 at discharge  Follow-up labs show a creatinine of 4 mg/dL  Frances Hernandez underwent significant adjustment of antihypertensives during second hospital visit  He was discharged on labetalol, chlorthalidone, hydralazine, nifedipine and torsemide      Pt reports noticing visual disturbances since recent CVA-- reports difficulties with binocular vision, difficulties with scanning to L visual field  Pt with L facial droop that persists  Pt denies Hand and UE deficits  Pt reports visual disturbances is currently affecting balance at this time  Pt with mild tremors in BUE  Pt lives in a two story home with mother and father  Pt I status for all ADLs at this time  Pt dependent on mother for all IADLs  Pt loss role of  and worker at this time  Pt worked prior as a [employer]  prior to the CVA  PMH:   Past Medical History:   Diagnosis Date    Acute kidney injury ([facility] [address] )     Cerebellar stroke side undetermined 2015    Diabetes type 1, controlled ([facility] [address] )     Hypertension          Pain Levels:     Restin    With Activity:  0    Objective     Functional Assessment     Comments  See impairment section for further details  Impairment Observations:      Dynamometer Position #2:   R: 105  L: 115    Action:  3 point pinch: R 20 and L 20  2 point pinch: R 16 and L 14 5  Lateral pinch: R 24 and L 23    9-hole Peg Test: RUE: 26 50 seconds, LUE: 32 78 seconds  Pt is R hand dominant; minimal BUE tremors noted      AROM:  Full throughout BUE  MMT 5/5 throughout BUE      Vision Screen: no glasses or contacts worn  Visual acuity, near:  R 20/70 with one error, L 20/70 with one error  Binocularity, far: orthophoric  Binocularity, near: low exophoria  Red/Green fusion:  Diplopia at 10"  Convergence: slight decreased teaming in L eye  Surjit string: suppression of near vision  Pursuits:  Slightly jerky in all planes; Pt unable to scan to L visual field with L eye; L eye stops at midline position  Saccades: inaccurate in all planes, dysmetria (undershooting);  Pt unable to track to L visual field with L eye; L eye stops at midline position  Limited-- Pt unable to scan to L visual field with L eye; L eye stops at midline position  Visual perceptual midline shifted below horizon  CISS 16/60 PASS  L head tilt noted while performing vision screen; resting L esophoria   Pt reports neurologist recommending Pt for neuro optometrist to further assess visual insufficiencies  MOCA version 7 1 scoring 28/30 indicative of WNL range  Assessment    Assessment details: See skilled analysis for further details  Skilled Analysis:  Pt is a 27 y o  male referred to Occupational Therapy s/p CVA  Pt presents with visual insufficiencies  Pt unable to scan to L visual field with L eye; L eye stops at midline position; resting L esophoria, and poor binocularity  Pt to schedule an appt with neuro optometrist to further assess visual deficits  Pt will benefit from skilled Occupational Therapy services 2x/week for 8 weeks with focus on visual perceptual and motor training to improve on the above deficits and enhance QOL  Short Term Goals:  - Pt will increase oculomotor control for improved saccades, con/divergent tasks for improved reading, board to table tasks with minimal increase in symptoms 4 weeks  - Pt will increased L eye pursuits and ROM to L visual field x 50% for improved binocularity x 4 weeks           Long Term Goals:  -Pt will increase oculomotor control for improved dynamic activities with head turns, board/screen to table tasks symptom free, improved VMI and return to baseline handwriting  - Pt will increase oculomotor control for WNL saccades, con/divergent tasks symptom free for improved QOL  -Pt will increased L eye pursuits and ROM to L visual field x 75% for improved binocularity        INTERVENTION COMMENTS:  Diagnosis: CVA  Precautions: Acute Kidney injury, HTN, DM  FOTO: 23% limitation in UE function  Insurance: Payor: Darya Serrano / Plan: Darya Serrano / Product Type: Medicaid HMO /   1 of 24 visits, PN due 03/27/2018

## 2018-02-27 NOTE — ASSESSMENT & PLAN NOTE
With conjugate gaze palsy, which per pt is resolving  He continues to have balance issues related to this  He is getting PT/OT but would also benefit from seeing Dr Shayna Young  He is managing his secondary stroke risks-his BP is labile but is being managed by nephrology  He reports good control of his blood sugars  He continues on lipitor as well as aspirin and statin  He has also quit smoking  I do not feel that he needs dual anti-platelets, and have also advised him that he can decrease his aspirin to 81mg daily  I will check API and P2Y12 to determine which would be most efficacious  He has a history of a prothrombin gene mutation, and there is a question of whether he would require full Ac  In the hospital, hematology did not feel that this was necessary  He is to see hematology on 3/5 and I will defer this decision to them

## 2018-02-27 NOTE — ASSESSMENT & PLAN NOTE
bp high in AM but improves with medications  Pt to continue close monitoring on chlorthalidone 50mg daily, hydralazine 100mg TID, labetalol 300mg TID, nifedipine ER 60mg daily and torsemide 20mg daily  Has clonidine 0 1mg as needed

## 2018-02-27 NOTE — ASSESSMENT & PLAN NOTE
bp improving  He remains on ASA/plavix and statin  He has quit smoking  In PT/OT and established with Neuro  Has upcoming appt with heme for consideration of anticoagulation due to PT gene mutation, elev homocysteine level and MTHR mutations with multiple CVAs

## 2018-02-27 NOTE — PROGRESS NOTES
Assessment/Plan:    Hypertensive urgency  bp high in AM but improves with medications  Pt to continue close monitoring on chlorthalidone 50mg daily, hydralazine 100mg TID, labetalol 300mg TID, nifedipine ER 60mg daily and torsemide 20mg daily  Has clonidine 0 1mg as needed  Cerebrovascular accident (CVA) of left pontine structure ([address] )  bp improving  He remains on ASA/plavix and statin  He has quit smoking  In PT/OT and established with Neuro  Has upcoming appt with heme for consideration of anticoagulation due to PT gene mutation, elev homocysteine level and MTHR mutations with multiple CVAs  Chronic kidney disease, stage 4 (severe) ([facility])  Baseline cr around 3 5  He is followed by Nephrology    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,    He has been referred to neuro-ophtho     1  Hypertensive urgency     2  Type 1 diabetes mellitus with diabetic nephropathy, with long-term current use of insulin ([facility])  B-D ULTRAFINE III SHORT PEN 31G X 8 MM MISC   3  Cerebrovascular accident (CVA) of left pontine structure ([address] )     4  Chronic kidney disease, stage 4 (severe) ([facility])           Subjective:      Patient ID: Nisreen Pham is a 27 y o  male  HPI   28yo male with Dm1 with nephropathy, HLD, HTN, CKD Iv, h/o cerebellar stroke with recent L pontine CVA here for follow up care  He is accompanied by his mom  He was rehospitalized at [facility] 2/10-2/19/18 for hypertensive urgency  Home sbp were 200s/-  He had persistent dizziness from his stroke  Medications were adjusted  He received 1units PRBCs due to AOCD  Home bp lately have been high in AM around 190-200s/110s, they decrease as the day goes on  He is in Pt, started OT today  Has had follow up with Nephrology and Neurology  Is scheduled to see Heme due to pos MTHR mutations and PT gene mutation  Also referred to Cardio due to L atrial dilation  He plans to see Neuro-ophtho due to conjugate gaze palsy    Reports R blurry vision, nausea in Am  Denies Cp, SOB  He has quit smoking        The following portions of the patient's history were reviewed and updated as appropriate: allergies, current medications, past family history, past medical history, past social history, past surgical history and problem list       Current Outpatient Prescriptions:     aspirin 81 mg chewable tablet, Chew 2 tablets (162 mg total) daily (Patient taking differently: Chew 81 mg daily  ), Disp: 30 tablet, Rfl: 0    atorvastatin (LIPITOR) 40 mg tablet, Take 1 tablet (40 mg total) by mouth every evening, Disp: 30 tablet, Rfl: 0    B-D ULTRAFINE III SHORT PEN 31G X 8 MM MISC, Inject under the skin 4 (four) times a day, Disp: 400 each, Rfl: 5    chlorthalidone (HYGROTEN) 50 MG tablet, Take 1 tablet (50 mg total) by mouth daily, Disp: 30 tablet, Rfl: 5    cloNIDine (CATAPRES) 0 1 mg tablet, Take 1 tablet (0 1 mg total) by mouth every 8 (eight) hours as needed for high blood pressure (For SBP greater then 180), Disp: 90 tablet, Rfl: 3    clopidogrel (PLAVIX) 75 mg tablet, Take 1 tablet (75 mg total) by mouth daily, Disp: 30 tablet, Rfl: 0    ergocalciferol (VITAMIN D2) 50,000 units, Take 1 capsule (50,000 Units total) by mouth once a week for 10 doses, Disp: 8 capsule, Rfl: 0    escitalopram (LEXAPRO) 10 mg tablet, Take 1 tablet (10 mg total) by mouth daily, Disp: 30 tablet, Rfl: 0    folic acid (FOLVITE) 1 mg tablet, Take 1 tablet (1 mg total) by mouth daily, Disp: 30 tablet, Rfl: 0    hydrALAZINE (APRESOLINE) 100 MG tablet, Take 1 tablet (100 mg total) by mouth 3 (three) times a day, Disp: 270 tablet, Rfl: 3    insulin glargine (LANTUS) 100 units/mL subcutaneous injection, Inject 8 Units under the skin daily at bedtime, Disp: 10 mL, Rfl: 0    insulin glargine (LANTUS) 100 units/mL subcutaneous injection, Inject 8 Units under the skin daily with breakfast, Disp: 10 mL, Rfl: 0    insulin lispro (HumaLOG) 100 units/mL injection, Inject 5 Units under the skin 3 (three) times a day with meals U-100 pen, Disp: 10 mL, Rfl: 0    labetalol (NORMODYNE) 300 mg tablet, Take 1 tablet (300 mg total) by mouth every 8 (eight) hours, Disp: 270 tablet, Rfl: 3    NIFEdipine ER (ADALAT CC) 60 MG 24 hr tablet, Take 1 tablet (60 mg total) by mouth 2 (two) times a day, Disp: 180 tablet, Rfl: 3    ondansetron (ZOFRAN) 4 mg tablet, Take 1 tablet (4 mg total) by mouth every 8 (eight) hours as needed for nausea or vomiting, Disp: 20 tablet, Rfl: 0    torsemide (DEMADEX) 20 mg tablet, Take 1 tablet (20 mg total) by mouth daily, Disp: 180 tablet, Rfl: 3    Review of Systems   Constitutional: Negative  Eyes: Positive for visual disturbance  Respiratory: Negative  Cardiovascular: Negative  Gastrointestinal: Positive for nausea  Neurological: Positive for dizziness and headaches  Objective:    /80 (BP Location: Left arm, Patient Position: Sitting)   Pulse 83   Temp (!) 97 4 °F (36 3 °C)   Resp 14   Ht 5' 11" (1 803 m)   Wt 96 2 kg (212 lb)   SpO2 98%   BMI 29 57 kg/m²      Physical Exam   Constitutional: He is oriented to person, place, and time  He appears well-developed and well-nourished  No distress  HENT:   Mouth/Throat: No oropharyngeal exudate  Eyes: Pupils are equal, round, and reactive to light  L lateral gaze palsy   Neck: Neck supple  Cardiovascular: Normal rate, regular rhythm, normal heart sounds and intact distal pulses  Pulmonary/Chest: Effort normal and breath sounds normal    Neurological: He is alert and oriented to person, place, and time  He displays a negative Romberg sign  Psychiatric: He has a normal mood and affect  His behavior is normal    Vitals reviewed

## 2018-02-28 ENCOUNTER — OFFICE VISIT (OUTPATIENT)
Dept: PHYSICAL THERAPY | Facility: CLINIC | Age: 33
End: 2018-02-28
Payer: COMMERCIAL

## 2018-02-28 ENCOUNTER — LAB (OUTPATIENT)
Dept: LAB | Facility: CLINIC | Age: 33
End: 2018-02-28
Payer: COMMERCIAL

## 2018-02-28 ENCOUNTER — TRANSCRIBE ORDERS (OUTPATIENT)
Dept: LAB | Facility: CLINIC | Age: 33
End: 2018-02-28

## 2018-02-28 DIAGNOSIS — N18.4 CHRONIC KIDNEY DISEASE, STAGE 4 (SEVERE) (HCC): ICD-10-CM

## 2018-02-28 DIAGNOSIS — N17.9 ACUTE RENAL FAILURE SUPERIMPOSED ON STAGE 3 CHRONIC KIDNEY DISEASE, UNSPECIFIED ACUTE RENAL FAILURE TYPE: ICD-10-CM

## 2018-02-28 DIAGNOSIS — I16.0 HYPERTENSIVE URGENCY: ICD-10-CM

## 2018-02-28 DIAGNOSIS — I63.9 CEREBROVASCULAR ACCIDENT (CVA) OF LEFT PONTINE STRUCTURE (HCC): ICD-10-CM

## 2018-02-28 DIAGNOSIS — N18.3 ACUTE RENAL FAILURE SUPERIMPOSED ON STAGE 3 CHRONIC KIDNEY DISEASE, UNSPECIFIED ACUTE RENAL FAILURE TYPE: ICD-10-CM

## 2018-02-28 DIAGNOSIS — E10.21 TYPE 1 DIABETES MELLITUS WITH DIABETIC NEPHROPATHY, WITH LONG-TERM CURRENT USE OF INSULIN (HCC): ICD-10-CM

## 2018-02-28 DIAGNOSIS — I63.9 CEREBROVASCULAR ACCIDENT (CVA) OF LEFT PONTINE STRUCTURE (HCC): Primary | ICD-10-CM

## 2018-02-28 DIAGNOSIS — I63.9 STROKE-IN-EVOLUTION SYNDROME (HCC): Primary | ICD-10-CM

## 2018-02-28 LAB
ANION GAP SERPL CALCULATED.3IONS-SCNC: 11 MMOL/L (ref 4–13)
BUN SERPL-MCNC: 61 MG/DL (ref 5–25)
CALCIUM SERPL-MCNC: 9 MG/DL (ref 8.3–10.1)
CHLORIDE SERPL-SCNC: 108 MMOL/L (ref 100–108)
CO2 SERPL-SCNC: 21 MMOL/L (ref 21–32)
CREAT SERPL-MCNC: 4.58 MG/DL (ref 0.6–1.3)
CRP SERPL QL: <3 MG/L
ERYTHROCYTE [DISTWIDTH] IN BLOOD BY AUTOMATED COUNT: 13.4 % (ref 11.6–15.1)
ERYTHROCYTE [SEDIMENTATION RATE] IN BLOOD: 43 MM/HOUR (ref 0–10)
GFR SERPL CREATININE-BSD FRML MDRD: 16 ML/MIN/1.73SQ M
GLUCOSE SERPL-MCNC: 160 MG/DL (ref 65–140)
HCT VFR BLD AUTO: 25.7 % (ref 36.5–49.3)
HCYS SERPL-SCNC: 16.5 UMOL/L (ref 5.3–14.2)
HGB BLD-MCNC: 8.9 G/DL (ref 12–17)
MAGNESIUM SERPL-MCNC: 2.8 MG/DL (ref 1.6–2.6)
MCH RBC QN AUTO: 31.1 PG (ref 26.8–34.3)
MCHC RBC AUTO-ENTMCNC: 34.6 G/DL (ref 31.4–37.4)
MCV RBC AUTO: 90 FL (ref 82–98)
PLATELET # BLD AUTO: 273 THOUSANDS/UL (ref 149–390)
PMV BLD AUTO: 9.9 FL (ref 8.9–12.7)
POTASSIUM SERPL-SCNC: 5.1 MMOL/L (ref 3.5–5.3)
RBC # BLD AUTO: 2.86 MILLION/UL (ref 3.88–5.62)
SODIUM SERPL-SCNC: 140 MMOL/L (ref 136–145)
WBC # BLD AUTO: 5.19 THOUSAND/UL (ref 4.31–10.16)

## 2018-02-28 PROCEDURE — 86140 C-REACTIVE PROTEIN: CPT

## 2018-02-28 PROCEDURE — 97112 NEUROMUSCULAR REEDUCATION: CPT

## 2018-02-28 PROCEDURE — 83090 ASSAY OF HOMOCYSTEINE: CPT

## 2018-02-28 PROCEDURE — 84165 PROTEIN E-PHORESIS SERUM: CPT | Performed by: PATHOLOGY

## 2018-02-28 PROCEDURE — 83735 ASSAY OF MAGNESIUM: CPT

## 2018-02-28 PROCEDURE — 85027 COMPLETE CBC AUTOMATED: CPT

## 2018-02-28 PROCEDURE — 84165 PROTEIN E-PHORESIS SERUM: CPT

## 2018-02-28 PROCEDURE — 97110 THERAPEUTIC EXERCISES: CPT

## 2018-02-28 PROCEDURE — 86038 ANTINUCLEAR ANTIBODIES: CPT

## 2018-02-28 PROCEDURE — 86255 FLUORESCENT ANTIBODY SCREEN: CPT

## 2018-02-28 PROCEDURE — 36415 COLL VENOUS BLD VENIPUNCTURE: CPT

## 2018-02-28 PROCEDURE — 85652 RBC SED RATE AUTOMATED: CPT

## 2018-02-28 PROCEDURE — 80048 BASIC METABOLIC PNL TOTAL CA: CPT

## 2018-02-28 NOTE — PROGRESS NOTES
Daily Note     Today's date: 2018  Patient name: Alondra Lewis  : 1985  MRN: [id number]  Referring provider: Brad Herrera DO  Dx:   Encounter Diagnosis     ICD-10-CM    1  Cerebrovascular accident (CVA) of left pontine structure ([facility] [address] ) I63 50    2  Hypertensive urgency I16 0    3  Chronic kidney disease, stage 4 (severe) ([facility]) N18 4    4  Type 1 diabetes mellitus with diabetic nephropathy, with long-term current use of insulin ([facility]) E10 21        Start Time: 1000  Stop Time: 1055  Total time in clinic (min): 55 minutes    Subjective: Pt  Reported no new complaints  Objective: See treatment diary below    Precautions: Blind, diabetes, CVA     Daily Treatment Diary         Exercise Diary  18         Bike 15 min  10 min         Treadmill             FTEO  airex 30"x3  airex 30"x3         FAEC airex 30"x3  airex 30"x3         Tandem gait     3 laps  Mini //           Semi-tandem stance 30"x2  30"x2         HK marches 10ft // bars 4 laps no UE  10ft // bars 4 laps no UE         STS feet staggered    10x L foot back  10x R foot back          Step taps from foam    20x ea 6" step         Rockerboard taps A/P 15x - occ use of Ue  M/L A/P 15x - occ use of Ue  M/L         HR/TR standing 20x ea   10ft // bars 4 laps no UE          cone taps    10xea                                                                                                                                     Assessment: Tolerated treatment fair  Patient noted fatigue throughout treatment patient asked to continue with treatment with fatigue noted  Patient was able to perform added exercises with minimal LOB and mid-mod fatigue noted post treatment  Rest breaks taken in between exercises  Patient was able to perform tandem and high knee marches with no UE HHA  Patient would benefit from continued PT      Plan: Progress treatment as tolerated

## 2018-03-01 ENCOUNTER — TELEPHONE (OUTPATIENT)
Dept: NEUROLOGY | Facility: CLINIC | Age: 33
End: 2018-03-01

## 2018-03-01 ENCOUNTER — TRANSCRIBE ORDERS (OUTPATIENT)
Dept: LAB | Facility: HOSPITAL | Age: 33
End: 2018-03-01

## 2018-03-01 ENCOUNTER — OFFICE VISIT (OUTPATIENT)
Dept: OCCUPATIONAL THERAPY | Facility: CLINIC | Age: 33
End: 2018-03-01
Payer: COMMERCIAL

## 2018-03-01 ENCOUNTER — APPOINTMENT (OUTPATIENT)
Dept: LAB | Facility: HOSPITAL | Age: 33
End: 2018-03-01
Payer: COMMERCIAL

## 2018-03-01 DIAGNOSIS — I63.9 CEREBROVASCULAR ACCIDENT (CVA), UNSPECIFIED MECHANISM (HCC): Primary | ICD-10-CM

## 2018-03-01 DIAGNOSIS — I63.9 CEREBROVASCULAR ACCIDENT (CVA) OF LEFT PONTINE STRUCTURE (HCC): ICD-10-CM

## 2018-03-01 DIAGNOSIS — H51.0 BINOCULAR VISION DISORDER WITH CONJUGATE GAZE PALSY: Chronic | ICD-10-CM

## 2018-03-01 LAB
PA ADP BLD-ACNC: 189 PRU (ref 194–418)
PA ADP BLD-ACNC: 382 ARU

## 2018-03-01 PROCEDURE — 97535 SELF CARE MNGMENT TRAINING: CPT | Performed by: OCCUPATIONAL THERAPIST

## 2018-03-01 PROCEDURE — 85576 BLOOD PLATELET AGGREGATION: CPT

## 2018-03-01 PROCEDURE — 36415 COLL VENOUS BLD VENIPUNCTURE: CPT

## 2018-03-01 NOTE — TELEPHONE ENCOUNTER
Thom Ferrara  I was told per the  today that [facility] called today bc they were tasked by you re P2Y12, I see pt had these labs drawn and are abnormal, what are you looking to have done for this patient    If you want nursing to discuss these results please advise

## 2018-03-01 NOTE — PROGRESS NOTES
Daily Note     Today's date: 3/1/2018  Patient name: Huey Quispe  : 1985  MRN: [id number]  Referring provider: Parmjit Boucher DO  Dx: No diagnosis found  Subjective: "My BP was actually pretty good this morning"  Objective: See treatment description below      Assessment: Tolerated treatment fair  Patient exhibited good technique with therapeutic exercises     BP readings with 153/85 as per Pt, which is on the lower readings for Pt  Pt reports monitor BP closely throughout the day  Tracking tube with focus on scanning/tracking from L to R with occluding R eye initially  Pt with R eye occluded x 10 minutes and then OTR occluded L eye to strengthen B/L eyes L>R and increase ROM abilities  Pt reports visual blurriness with L eye taped-- reports more blurriness noted in R eye vs  L eye  [place] Se scanning sheet with focus on sustained convergence with visual clutter facilitating improved binocularity-- Pt with x 1 tendency of compensating by occluding R eye; Pt able to self-correct  Pt continues with a L head tilt to compensate 2* resting L esophoric position of eye  Pt denies visual fatigue or diplopia  Multi-matrix beginning functional task with focus on visual tracking and scanning in all visual field facilitating improved binocular vision-- Pt with increased time required jackson visual scanning/tracking abilities 2* significant visual blurriness  Pt presents with improved abilities to identify when occluding vision in one eye-- able to self-correct without cues required  Plan: Continue per plan of care         INTERVENTION COMMENTS:  Diagnosis: CVA  Precautions: Acute Kidney injury, HTN, DM  FOTO: 23% limitation in UE function  Insurance: Payor: Gemma Mckeon / Plan: Gemma Mckeon / Product Type: Medicaid HMO /   2 of 24 visits, PN due 2018

## 2018-03-01 NOTE — TELEPHONE ENCOUNTER
Can someone please call Gopi Foy and let him know that his API and P2Y12 are both therapeutic, meaning he responds appropriately to both aspirin and plavix   I would have him continue the aspirin 81mg and plavix 75mg daily for now, and let hematology make the final decision at his appointment on Monday  Thanks  (I accidentally sent to neuro-surg the first time   oops!)

## 2018-03-02 LAB
C-ANCA TITR SER IF: NORMAL TITER
MYELOPEROXIDASE AB SER IA-ACNC: <9 U/ML (ref 0–9)
P-ANCA ATYPICAL TITR SER IF: NORMAL TITER
P-ANCA TITR SER IF: NORMAL TITER
PROTEINASE3 AB SER IA-ACNC: <3.5 U/ML (ref 0–3.5)
RYE IGE QN: NEGATIVE

## 2018-03-03 LAB
ALBUMIN SERPL ELPH-MCNC: 3.74 G/DL (ref 3.5–5)
ALBUMIN SERPL ELPH-MCNC: 62.3 % (ref 52–65)
ALPHA1 GLOB SERPL ELPH-MCNC: 0.38 G/DL (ref 0.1–0.4)
ALPHA1 GLOB SERPL ELPH-MCNC: 6.3 % (ref 2.5–5)
ALPHA2 GLOB SERPL ELPH-MCNC: 0.97 G/DL (ref 0.4–1.2)
ALPHA2 GLOB SERPL ELPH-MCNC: 16.2 % (ref 7–13)
BETA GLOB ABNORMAL SERPL ELPH-MCNC: 0.32 G/DL (ref 0.4–0.8)
BETA1 GLOB SERPL ELPH-MCNC: 5.4 % (ref 5–13)
BETA2 GLOB SERPL ELPH-MCNC: 3.5 % (ref 2–8)
BETA2+GAMMA GLOB SERPL ELPH-MCNC: 0.21 G/DL (ref 0.2–0.5)
GAMMA GLOB ABNORMAL SERPL ELPH-MCNC: 0.38 G/DL (ref 0.5–1.6)
GAMMA GLOB SERPL ELPH-MCNC: 6.3 % (ref 12–22)
IGG/ALB SER: 1.65 {RATIO} (ref 1.1–1.8)
PROT SERPL-MCNC: 6 G/DL (ref 6.4–8.2)

## 2018-03-05 ENCOUNTER — OFFICE VISIT (OUTPATIENT)
Dept: HEMATOLOGY ONCOLOGY | Facility: CLINIC | Age: 33
End: 2018-03-05
Payer: COMMERCIAL

## 2018-03-05 VITALS
RESPIRATION RATE: 16 BRPM | DIASTOLIC BLOOD PRESSURE: 90 MMHG | TEMPERATURE: 98.5 F | WEIGHT: 213 LBS | BODY MASS INDEX: 29.82 KG/M2 | SYSTOLIC BLOOD PRESSURE: 140 MMHG | HEIGHT: 71 IN | OXYGEN SATURATION: 98 % | HEART RATE: 88 BPM

## 2018-03-05 DIAGNOSIS — E72.11 HYPERHOMOCYSTEINEMIA (HCC): Chronic | ICD-10-CM

## 2018-03-05 DIAGNOSIS — I63.9 CEREBROVASCULAR ACCIDENT (CVA) OF LEFT PONTINE STRUCTURE (HCC): Chronic | ICD-10-CM

## 2018-03-05 DIAGNOSIS — D63.1 ANEMIA IN STAGE 3 CHRONIC KIDNEY DISEASE (HCC): Primary | Chronic | ICD-10-CM

## 2018-03-05 DIAGNOSIS — N18.30 ANEMIA IN STAGE 3 CHRONIC KIDNEY DISEASE (HCC): Primary | Chronic | ICD-10-CM

## 2018-03-05 DIAGNOSIS — E10.21 TYPE 1 DIABETES MELLITUS WITH DIABETIC NEPHROPATHY, WITH LONG-TERM CURRENT USE OF INSULIN (HCC): Chronic | ICD-10-CM

## 2018-03-05 DIAGNOSIS — I16.0 HYPERTENSIVE URGENCY: ICD-10-CM

## 2018-03-05 DIAGNOSIS — D68.52 PROTHROMBIN GENE MUTATION (HCC): ICD-10-CM

## 2018-03-05 PROCEDURE — 99214 OFFICE O/P EST MOD 30 MIN: CPT | Performed by: INTERNAL MEDICINE

## 2018-03-05 NOTE — PROGRESS NOTES
Oncology Consult Note  Thelbert Bamberger Mulberger 27 y o  male MRN: [id number]  Unit/Bed#:  Encounter: [id number]      Presenting Complaint: prothrombin gene mutation, history of stroke x2    History of Presenting Illness: 15-year-old  male with past medical history of hypertension, type 1 diabetes mellitus, insulin dependent, had a history of stroke at [facility] in 2015, he was admitted to the hospital in February 2018 with hypertensive crisis, was found to have weakness consistent with ischemic pontine area  Stroke, he was evaluated by Neurology, thrombophilia profile showed heterozygous for prothrombin gene mutation, elevation of homocystine at 16  He also has acute chronic kidney disease imposed on a chronic kidney disease with creatinine of 5, anemia secondary to chronic kidney insufficiency with hemoglobin of 8 9, normal iron studies  Currently on folic acid, Plavix 75 mg p o  Daily, aspirin 81 mg daily   Prior to the current admission to the hospital the patient told me that he was taking baby aspirin like 3 to 4 times a week  He was evaluated by Neurology as well   He smokes 1 pack of cigarettes daily   He uses marijuana intermittently  He does not drink  No family history of blood disorders    Review of Systems - As stated in the HPI otherwise the fourteen point review of systems was negative      Past Medical History:   Diagnosis Date    Acute kidney injury (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     Social History Main Topics    Smoking status: Former Smoker     Packs/day: 0 50     Years: 12 00     Types: Cigarettes    Smokeless tobacco: Never Used      Comment: quit 2/2018    Alcohol use Yes      Comment: socially    Drug use: Yes     Frequency: 3 0 times per week     Types: Marijuana      Comment: weekly    Sexual activity: Not Asked     Other Topics Concern    None     Social History Narrative    Caffeine use    single       Family History   Problem Relation Age of Onset    Breast cancer Mother     Hyperlipidemia Father     Hypertension Father     Leukemia Maternal Grandmother     Hyperlipidemia Maternal Grandfather     Hypertension Maternal Grandfather     Hyperlipidemia Paternal Grandmother     Hypertension Paternal Grandmother     Heart disease Paternal Grandfather      cardiac disorder    Diabetes Paternal Grandfather        Allergies   Allergen Reactions    Sulfa Antibiotics Rash         Current Outpatient Prescriptions:     aspirin 81 mg chewable tablet, Chew 2 tablets (162 mg total) daily (Patient taking differently: Chew 81 mg daily  ), Disp: 30 tablet, Rfl: 0    atorvastatin (LIPITOR) 40 mg tablet, Take 1 tablet (40 mg total) by mouth every evening, Disp: 30 tablet, Rfl: 0    B-D ULTRAFINE III SHORT PEN 31G X 8 MM MISC, Inject under the skin 4 (four) times a day, Disp: 400 each, Rfl: 5    chlorthalidone (HYGROTEN) 50 MG tablet, Take 1 tablet (50 mg total) by mouth daily, Disp: 30 tablet, Rfl: 5    cloNIDine (CATAPRES) 0 1 mg tablet, Take 1 tablet (0 1 mg total) by mouth every 8 (eight) hours as needed for high blood pressure (For SBP greater then 180), Disp: 90 tablet, Rfl: 3    clopidogrel (PLAVIX) 75 mg tablet, Take 1 tablet (75 mg total) by mouth daily, Disp: 30 tablet, Rfl: 0    ergocalciferol (VITAMIN D2) 50,000 units, Take 1 capsule (50,000 Units total) by mouth once a week for 10 doses, Disp: 8 capsule, Rfl: 0    escitalopram (LEXAPRO) 10 mg tablet, Take 1 tablet (10 mg total) by mouth daily, Disp: 30 tablet, Rfl: 0    folic acid (FOLVITE) 1 mg tablet, Take 1 tablet (1 mg total) by mouth daily, Disp: 30 tablet, Rfl: 0    hydrALAZINE (APRESOLINE) 100 MG tablet, Take 1 tablet (100 mg total) by mouth 3 (three) times a day, Disp: 270 tablet, Rfl: 3    insulin glargine (LANTUS) 100 units/mL subcutaneous injection, Inject 8 Units under the skin daily at bedtime, Disp: 10 mL, Rfl: 0    insulin glargine (LANTUS) 100 units/mL subcutaneous injection, Inject 8 Units under the skin daily with breakfast, Disp: 10 mL, Rfl: 0    insulin lispro (HumaLOG) 100 units/mL injection, Inject 5 Units under the skin 3 (three) times a day with meals U-100 pen, Disp: 10 mL, Rfl: 0    labetalol (NORMODYNE) 300 mg tablet, Take 1 tablet (300 mg total) by mouth every 8 (eight) hours, Disp: 270 tablet, Rfl: 3    NIFEdipine ER (ADALAT CC) 60 MG 24 hr tablet, Take 1 tablet (60 mg total) by mouth 2 (two) times a day, Disp: 180 tablet, Rfl: 3    ondansetron (ZOFRAN) 4 mg tablet, Take 1 tablet (4 mg total) by mouth every 8 (eight) hours as needed for nausea or vomiting, Disp: 20 tablet, Rfl: 0    torsemide (DEMADEX) 20 mg tablet, Take 1 tablet (20 mg total) by mouth daily, Disp: 180 tablet, Rfl: 3      /90   Pulse 88   Temp 98 5 °F (36 9 °C) (Tympanic)   Resp 16   Ht 5' 10 5" (1 791 m)   Wt 96 6 kg (213 lb)   SpO2 98%   BMI 30 13 kg/m²       General Appearance:    Alert, oriented        Eyes:    PERRL   Ears:    Normal external ear canals, both ears   Nose:   Nares normal, septum midline   Throat:   Mucosa moist  Pharynx without injection  Neck:   Supple       Lungs:     Clear to auscultation bilaterally   Chest Wall:    No tenderness or deformity    Heart:    Regular rate and rhythm       Abdomen:     Soft, non-tender, bowel sounds +, no organomegaly           Extremities:   Extremities no cyanosis or edema       Skin:   no rash or icterus  Lymph nodes:   Cervical, supraclavicular, and axillary nodes normal   Neurologic:   CNII-XII intact, normal strength, weakness of the left side             No results found for this or any previous visit (from the past 48 hour(s))        Vas Renal Artery Complete    Result Date: 2/12/2018  Narrative:  [facility] REPORT CLINICAL: Indications:  Benign Essential Hypertension [I10]  Presents with mild HA and nausea and systolic pressure over 558IBDP  Risk Factors The patient has history of obesity, hypertension, diabetes (Type I) and previous smoking (quit 1-5yrs ago)  H/O 2 strokes, CKD In July of 2015 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 and also heterozygote for prothrombin gene mutation Clinical Right Pressure:  165/88 mm Hg  FINDINGS:  Unilateral     Impression      PSV (cm/s)  Sup-Otilia Ao                            178  Px Inf-Jitendra Ao                         147  Ds Inf-Jitendra Ao                         106  Prox  SMA      Not visualized               Right          Impression  PSV (cm/s)  EDV (cm/s)   RAR    RI  Kidney (cm)  Ostial Renal                      135          27  0 76                     Prox Renal     Normal             141          21  0 79  0 85               Mid Renal                         115          26  0 64  0 81               Dist Renal                         60          12  0 34  0 81               Celiac Artery                      27           8        0 71               Kidney                                                               11 20  Renal          Patent                                                        Left           Impression      PSV (cm/s)  EDV (cm/s)   RAR    RI  Kidney (cm)  Prox Renal     Not Visualized                                                   Dist Renal                             80              0 45                     Celiac Artery                          17           3        0 82               Kidney                                                                   12 10     CONCLUSION:  Impression The abdominal aorta is widely patent and normal caliber    RIGHT RENAL: No evidence of significant arterial occlusive disease in the main renal artery  Patent renal vein Adequate parenchymal flow noted with a renovascular resistive index of  71  Renal/Aorta Ratio:  79   The Kidney measures 11 2 cm  LEFT RENAL: The renovascular resistive index of  82 may suggest intrinsic parenchymal disease The Kidney measures 12 cm  Tech note: Limited study due to excessive bowel gas left renal artery and vein and mesenteric arteries not seen  SIGNATURE: Electronically Signed by: Catarino Deluna on 2018-02-12 12:40:26 PM    Xr Chest Portable Icu    Result Date: 2/12/2018  Narrative: CHEST INDICATION:  Dyspnea  COMPARISON:  1/22/2018 VIEWS:   AP frontal IMAGES:  1 FINDINGS: Heart size within normal limits for portable technique  Normal pulmonary vasculature  New right lower lobe consolidation  No pneumothorax or effusion  Bones are unremarkable  Impression: Right lower lobe pneumonia  The study was marked in EPIC for significant notification  Workstation performed: [id number]       Assessment and plan:  1  Recurrent thrombotic stroke in the  Pontine area of the brain, in a patient who had initial stroke in 2015 at [facility], his mother told me he was not compliant with his insulin, blood pressure medication, he presented to the hospital with hypertensive crisis, uncontrolled diabetes, acute kidney disease with creatinine of 5, anemia with hemoglobin of 8 9, evaluation by Neurology late 2 thrombophilia profile showed prothrombin gene mutation,  Hyper homocystinemia   Currently on Plavix and aspirin  2  The patient was partially compliant with aspirin prior to the this presentation  3  Plavix 75 mg p o  Daily indefinitely  4  Folic acid 1-2 mg p o  Daily  5  Follow-up in 3 months with CBC, homocystine level, MTHFR mutation  6   No need for Aranesp at this time because of recent stroke, he is asymptomatic from anemia point of view    Plan:

## 2018-03-06 ENCOUNTER — APPOINTMENT (OUTPATIENT)
Dept: LAB | Facility: CLINIC | Age: 33
End: 2018-03-06
Payer: COMMERCIAL

## 2018-03-06 DIAGNOSIS — E10.9 DIABETES MELLITUS TYPE 1 (HCC): ICD-10-CM

## 2018-03-06 DIAGNOSIS — R80.1 PERSISTENT PROTEINURIA: ICD-10-CM

## 2018-03-06 DIAGNOSIS — N18.30 ACUTE RENAL FAILURE WITH ACUTE TUBULAR NECROSIS SUPERIMPOSED ON STAGE 3 CHRONIC KIDNEY DISEASE (HCC): ICD-10-CM

## 2018-03-06 DIAGNOSIS — N17.9 ACUTE RENAL FAILURE SUPERIMPOSED ON STAGE 3 CHRONIC KIDNEY DISEASE, UNSPECIFIED ACUTE RENAL FAILURE TYPE: ICD-10-CM

## 2018-03-06 DIAGNOSIS — H51.0 BINOCULAR VISION DISORDER WITH CONJUGATE GAZE PALSY: ICD-10-CM

## 2018-03-06 DIAGNOSIS — H46.9 OPTIC NEURITIS DUE TO MULTIPLE SCLEROSIS (HCC): ICD-10-CM

## 2018-03-06 DIAGNOSIS — N18.30 ANEMIA IN STAGE 3 CHRONIC KIDNEY DISEASE (HCC): Chronic | ICD-10-CM

## 2018-03-06 DIAGNOSIS — I63.9 CEREBROVASCULAR ACCIDENT (CVA) OF LEFT PONTINE STRUCTURE (HCC): ICD-10-CM

## 2018-03-06 DIAGNOSIS — N28.9 DISORDER OF KIDNEY AND URETER: ICD-10-CM

## 2018-03-06 DIAGNOSIS — E10.21 TYPE 1 DIABETES MELLITUS WITH DIABETIC NEPHROPATHY (HCC): ICD-10-CM

## 2018-03-06 DIAGNOSIS — I16.0 HYPERTENSIVE URGENCY: ICD-10-CM

## 2018-03-06 DIAGNOSIS — E10.21 TYPE 1 DIABETES MELLITUS WITH DIABETIC NEPHROPATHY, WITH LONG-TERM CURRENT USE OF INSULIN (HCC): ICD-10-CM

## 2018-03-06 DIAGNOSIS — N18.3 ACUTE RENAL FAILURE SUPERIMPOSED ON STAGE 3 CHRONIC KIDNEY DISEASE, UNSPECIFIED ACUTE RENAL FAILURE TYPE: ICD-10-CM

## 2018-03-06 DIAGNOSIS — N17.0 ACUTE RENAL FAILURE WITH ACUTE TUBULAR NECROSIS SUPERIMPOSED ON STAGE 2 CHRONIC KIDNEY DISEASE (HCC): ICD-10-CM

## 2018-03-06 DIAGNOSIS — I63.9 CEREBROVASCULAR ACCIDENT (CVA), UNSPECIFIED MECHANISM (HCC): ICD-10-CM

## 2018-03-06 DIAGNOSIS — R11.0 NAUSEA: ICD-10-CM

## 2018-03-06 DIAGNOSIS — N18.9 CHRONIC KIDNEY DISEASE: ICD-10-CM

## 2018-03-06 DIAGNOSIS — R79.89 AZOTEMIA: ICD-10-CM

## 2018-03-06 DIAGNOSIS — H53.30 BINOCULAR VISUAL DISTURBANCE: ICD-10-CM

## 2018-03-06 DIAGNOSIS — E83.39 HYPERPHOSPHATEMIA: ICD-10-CM

## 2018-03-06 DIAGNOSIS — N17.0 ACUTE RENAL FAILURE WITH ACUTE TUBULAR NECROSIS SUPERIMPOSED ON STAGE 3 CHRONIC KIDNEY DISEASE (HCC): ICD-10-CM

## 2018-03-06 DIAGNOSIS — H53.8 BLURRED VISION: ICD-10-CM

## 2018-03-06 DIAGNOSIS — N18.2 ACUTE RENAL FAILURE WITH ACUTE TUBULAR NECROSIS SUPERIMPOSED ON STAGE 2 CHRONIC KIDNEY DISEASE (HCC): ICD-10-CM

## 2018-03-06 DIAGNOSIS — D63.1 ANEMIA IN STAGE 3 CHRONIC KIDNEY DISEASE (HCC): Chronic | ICD-10-CM

## 2018-03-06 DIAGNOSIS — E72.11 HYPERHOMOCYSTEINEMIA (HCC): ICD-10-CM

## 2018-03-06 DIAGNOSIS — R80.9 PROTEINURIA: ICD-10-CM

## 2018-03-06 DIAGNOSIS — I63.9 CVA (CEREBRAL VASCULAR ACCIDENT) (HCC): ICD-10-CM

## 2018-03-06 DIAGNOSIS — D68.52 PROTHROMBIN GENE MUTATION (HCC): ICD-10-CM

## 2018-03-06 DIAGNOSIS — Z86.73 HISTORY OF LACUNAR CEREBROVASCULAR ACCIDENT (CVA): ICD-10-CM

## 2018-03-06 DIAGNOSIS — E55.9 VITAMIN D DEFICIENCY: ICD-10-CM

## 2018-03-06 DIAGNOSIS — G35 OPTIC NEURITIS DUE TO MULTIPLE SCLEROSIS (HCC): ICD-10-CM

## 2018-03-06 DIAGNOSIS — I10 HYPERTENSION: ICD-10-CM

## 2018-03-06 LAB
ALBUMIN SERPL BCP-MCNC: 3 G/DL (ref 3.5–5)
ALP SERPL-CCNC: 81 U/L (ref 46–116)
ALT SERPL W P-5'-P-CCNC: 18 U/L (ref 12–78)
ANION GAP SERPL CALCULATED.3IONS-SCNC: 8 MMOL/L (ref 4–13)
AST SERPL W P-5'-P-CCNC: 12 U/L (ref 5–45)
BASOPHILS # BLD AUTO: 0.06 THOUSANDS/ΜL (ref 0–0.1)
BASOPHILS NFR BLD AUTO: 1 % (ref 0–1)
BILIRUB SERPL-MCNC: 0.34 MG/DL (ref 0.2–1)
BUN SERPL-MCNC: 49 MG/DL (ref 5–25)
CALCIUM SERPL-MCNC: 8.7 MG/DL (ref 8.3–10.1)
CHLORIDE SERPL-SCNC: 110 MMOL/L (ref 100–108)
CO2 SERPL-SCNC: 22 MMOL/L (ref 21–32)
CREAT SERPL-MCNC: 4.53 MG/DL (ref 0.6–1.3)
EOSINOPHIL # BLD AUTO: 0.2 THOUSAND/ΜL (ref 0–0.61)
EOSINOPHIL NFR BLD AUTO: 3 % (ref 0–6)
ERYTHROCYTE [DISTWIDTH] IN BLOOD BY AUTOMATED COUNT: 13 % (ref 11.6–15.1)
EST. AVERAGE GLUCOSE BLD GHB EST-MCNC: 131 MG/DL
GFR SERPL CREATININE-BSD FRML MDRD: 16 ML/MIN/1.73SQ M
GLUCOSE SERPL-MCNC: 252 MG/DL (ref 65–140)
HBA1C MFR BLD: 6.2 % (ref 4.2–6.3)
HCT VFR BLD AUTO: 25.8 % (ref 36.5–49.3)
HCYS SERPL-SCNC: 15.8 UMOL/L (ref 5.3–14.2)
HGB BLD-MCNC: 8.8 G/DL (ref 12–17)
LYMPHOCYTES # BLD AUTO: 1.21 THOUSANDS/ΜL (ref 0.6–4.47)
LYMPHOCYTES NFR BLD AUTO: 18 % (ref 14–44)
MCH RBC QN AUTO: 30.6 PG (ref 26.8–34.3)
MCHC RBC AUTO-ENTMCNC: 34.1 G/DL (ref 31.4–37.4)
MCV RBC AUTO: 90 FL (ref 82–98)
MONOCYTES # BLD AUTO: 0.47 THOUSAND/ΜL (ref 0.17–1.22)
MONOCYTES NFR BLD AUTO: 7 % (ref 4–12)
NEUTROPHILS # BLD AUTO: 4.78 THOUSANDS/ΜL (ref 1.85–7.62)
NEUTS SEG NFR BLD AUTO: 71 % (ref 43–75)
NRBC BLD AUTO-RTO: 0 /100 WBCS
PLATELET # BLD AUTO: 268 THOUSANDS/UL (ref 149–390)
PMV BLD AUTO: 10.2 FL (ref 8.9–12.7)
POTASSIUM SERPL-SCNC: 5.6 MMOL/L (ref 3.5–5.3)
PROT SERPL-MCNC: 6.1 G/DL (ref 6.4–8.2)
PROT UR-MCNC: 539 MG/DL
RBC # BLD AUTO: 2.88 MILLION/UL (ref 3.88–5.62)
SODIUM SERPL-SCNC: 140 MMOL/L (ref 136–145)
WBC # BLD AUTO: 6.75 THOUSAND/UL (ref 4.31–10.16)

## 2018-03-06 PROCEDURE — 80053 COMPREHEN METABOLIC PANEL: CPT

## 2018-03-06 PROCEDURE — 81291 MTHFR GENE: CPT

## 2018-03-06 PROCEDURE — 84156 ASSAY OF PROTEIN URINE: CPT

## 2018-03-06 PROCEDURE — 83090 ASSAY OF HOMOCYSTEINE: CPT | Performed by: INTERNAL MEDICINE

## 2018-03-06 PROCEDURE — 36415 COLL VENOUS BLD VENIPUNCTURE: CPT

## 2018-03-06 PROCEDURE — 85025 COMPLETE CBC W/AUTO DIFF WBC: CPT

## 2018-03-06 PROCEDURE — 83036 HEMOGLOBIN GLYCOSYLATED A1C: CPT

## 2018-03-06 PROCEDURE — 83918 ORGANIC ACIDS TOTAL QUANT: CPT | Performed by: INTERNAL MEDICINE

## 2018-03-07 ENCOUNTER — APPOINTMENT (OUTPATIENT)
Dept: OCCUPATIONAL THERAPY | Facility: CLINIC | Age: 33
End: 2018-03-07
Payer: COMMERCIAL

## 2018-03-07 ENCOUNTER — APPOINTMENT (OUTPATIENT)
Dept: PHYSICAL THERAPY | Facility: CLINIC | Age: 33
End: 2018-03-07
Payer: COMMERCIAL

## 2018-03-09 ENCOUNTER — OFFICE VISIT (OUTPATIENT)
Dept: PHYSICAL THERAPY | Facility: CLINIC | Age: 33
End: 2018-03-09
Payer: COMMERCIAL

## 2018-03-09 ENCOUNTER — OFFICE VISIT (OUTPATIENT)
Dept: OCCUPATIONAL THERAPY | Facility: CLINIC | Age: 33
End: 2018-03-09
Payer: COMMERCIAL

## 2018-03-09 DIAGNOSIS — I63.9 CEREBROVASCULAR ACCIDENT (CVA), UNSPECIFIED MECHANISM (HCC): Primary | ICD-10-CM

## 2018-03-09 DIAGNOSIS — I63.9 CEREBROVASCULAR ACCIDENT (CVA) OF LEFT PONTINE STRUCTURE (HCC): Primary | ICD-10-CM

## 2018-03-09 DIAGNOSIS — E87.5 HYPERKALEMIA: Primary | ICD-10-CM

## 2018-03-09 LAB — METHYLMALONATE SERPL-SCNC: 347 NMOL/L (ref 0–378)

## 2018-03-09 PROCEDURE — G8978 MOBILITY CURRENT STATUS: HCPCS

## 2018-03-09 PROCEDURE — 97112 NEUROMUSCULAR REEDUCATION: CPT

## 2018-03-09 PROCEDURE — G8979 MOBILITY GOAL STATUS: HCPCS

## 2018-03-09 PROCEDURE — 97110 THERAPEUTIC EXERCISES: CPT

## 2018-03-09 PROCEDURE — 97535 SELF CARE MNGMENT TRAINING: CPT

## 2018-03-09 NOTE — PROGRESS NOTES
Progress Note    Today's date: 3/9/2018  Patient name: Dax Flores  : 1985  MRN: [id number]  Referring provider: Yasir King DO  Dx:   Encounter Diagnosis   Name Primary?  Cerebrovascular accident (CVA) of left pontine structure ([facility] [address] ) Yes       Start Time: 1100  Stop Time: 1200  Total time in clinic (min): 60 minutes    Assessment  Impairments: abnormal coordination, abnormal gait, activity intolerance, impaired balance, impaired physical strength and lacks appropriate home exercise program    Patient is not irritable  Assessment details: Progress note performed this session  Compared to initial evaluation patient demonstrates improved strength per the 5x STS, improved balance per the mCTSIS and the Callejas, and improved gait speed and endurance per the 6 MWT  Patient has made progress toward all his goals but continues to demonstrate impaired balance and LE strength and may benefit from continued skilled PT to address the above impairments and facilitate return to daily activities without limitation    Understanding of Dx/Px/POC: good   Prognosis: good    Goals  STG 1: Patient will complete the Callejas Balance Assessment with a score of 52/56 in 4 weeks - partially met  STG 2: Patient will ambulate 1150 feet during the 6 MWT with no AD and good stability in 4 weeks - partially met  STG 3: Patient will complete the 5x STS in <15 seconds with no UE use in 4 weeks - met    LTG 1: Patient will deny falls or near falls in 8 weeks - met  LTG 2: Patient will be independent with HEP in 8 weeks - partially met  LTG 3: Patient will be an independent community ambulator with a gait speed of >0 45 ft/sec to safely negotiate streets in 8 weeks - New      Plan  Patient would benefit from: skilled PT and OT eval  Referral necessary: Yes  Planned therapy interventions: neuromuscular re-education, patient education, balance, therapeutic exercise, therapeutic activities, strengthening and home exercise program  Frequency: 2x week  Duration in visits: 8  Duration in weeks: 4  Treatment plan discussed with: patient        Subjective Evaluation    History of Present Illness  Date of onset: 1/22/2018  Mechanism of injury: Patient reports that his balance is improving and he is feeling a little bit better with Pt  Patient reports compliance to HEP and denies falls since starting PT  Not a recurrent problem   Quality of life: fair    Pain  No pain reported    Social Support  Steps to enter house: yes  2  Stairs in house: yes (landing in the milddle of the flight)   20  Lives in: multiple-level home  Lives with: parents    Employment status: not working (Previously looking for a job and working for [employer])  Hand dominance: right      Diagnostic Tests  MRI studies: abnormal  CT scan: abnormal        Objective  PT/OT Neuro Exam  Neurologic Exam   Balance Test    6 Minute Walk Test (ft): 1100 feet with no AD   Gait Speed (ft/s): 4 16   5x Sit To Stand (s): 13 9 sec from purple chair         MCTSIB Number of Seconds   Feet Together, Eyes Open 30   Feet Together, Eyes Closed 30   Feet Together, Eyes Open Foam 30   Feet Together, Eyes Closed Foam 8       Flowsheet Rows    Flowsheet Row Most Recent Value   Callejas Balance Scale   1  Sitting to Standing  4   2  Standing Unsupported  4   3  Sitting with Back Unsupported but Feet Supported on Floor or on a Stool  4   4  Standing to Sitting  4   5  Transfers  4   6  Standing Unsupported with Eyes Closed  4   7  Standing Unsupported with Feet Together  4   8  Reach Forward with Outstretched Arm While Standing  4   9   Object from Floor from a Standing Position  4   10  Turning to Look Behind Over Left and Right Shoulders While Standing  4   11  Turn 360 Degrees  2   12  Place Alternate Foot on Step or Stool While Standing Unsupported  2   13  Standing Unsupported One Foot in [facility]  4   14   Standing on One Leg  2   Callejas Balance Score  50          Coordination Left Right   Heel To Shin + +   Rapid Alternating Movement     UE + +   LE + +       Sensation Left Right   Kinesthesia Intact Intact   Light Touch Intact Intact       Muscle Tone Left Right   Modified Joel Scale     Hamstring 0 0   Gastroc 0 0   Quad 0 0       Manual Muscle Testing - Hip Left Right   Flexion 4+ 4+   Extension 4 4   Abduction 4 4   External Rotation 4- 4-     Manual Muscle Testing - Knee Left Right   Flexion 5 5   Extension 4 5     Manual Muscle Testing - Ankle Left Right   Doriflexion 4- 4-   Plantarflexion 2 3        Transfers    Sit To Stand Independent   W/C To Bed Independent   Sit To Supine Independent   Roll Independent       Gait Assessment:   Decreased trunk rotation, moderate path deviation, wide COLBY, decreased reciprocal arm swing    Mild left facial drooping noted, denies difficulty with swallowing or drinking    VOMS (Vestibular/Ocular Motor Screen)-- blurry vision     Resting position: left lateral      Smooth pursuit Abnormal     Saccades (horizontal) Normal, slowed in all directions     Saccades (vertical)  Normal, slowed in all directions     Convergence: Insufficiency - decreased teaming bilaterally       Precautions: CVA with visual disturbances, CKD, DM type 1, HTN, falls    Daily Treatment Diary        Exercise Diary  2/26/18  2/28/2018  3/9       Bike 15 min  10 min  10 min L2       Treadmill             FTEO  airex 30"x3  airex 30"x3  airex 30"x3       FAEC airex 30"x3  airex 30"x3  airex 30"x3       Tandem gait     3 laps  Mini //   3 laps // bars       Semi-tandem stance 30"x2  30"x2 Tandem 30"x2       HK marches 10ft // bars 4 laps no UE  10ft // bars 4 laps no UE  3 laps       STS feet staggered    10x L foot back  10x R foot back   15x L foot back  15x R foot back        Step taps from foam    20x ea 6" step         Rockerboard taps A/P 15x - occ use of Ue  M/L A/P 15x - occ use of Ue  M/L  A/P 15x - occ use of Ue  M/L       HR/TR standing 20x ea   10ft // bars 4 laps no UE  30xea      cone taps    10xea From foam  10xea       Step downs     10xea 4"

## 2018-03-09 NOTE — PROGRESS NOTES
Daily Note     Today's date: 3/9/2018  Patient name: Ya Calderón  : 1985  MRN: [id number]  Referring provider: Yovanny Long DO  Dx:   Encounter Diagnosis   Name Primary?  Cerebrovascular accident (CVA), unspecified mechanism ([facility] [address] ) Yes                  Subjective: "If I take my glasses off my vision is pretty good upclose, just a little blurry  Objective: See treatment diary below      Assessment: Tolerated treatment well  Patient would benefit from continued OT    R eye occluded for 10 min switching to L eye occluded for 10 min during convergence/divergence task alphabetizing states with board to table copy  Pt stated decrease blurriness in L eye with R eye occluded  Scanning with increase in visual field to locate words and form into sentences, pt stated it was "kind a blurry"  Pt removed glasses for about 2/3 of session to improve visual acuity  Pt required additional time to locate words in clutter  Plan: Continued skilled OT per POC with focus on ocularmotor      INTERVENTION COMMENTS:  Diagnosis: CVA  Precautions: Acute Kidney injury, HTN, DM  Insurance: Payor: Dhruv Height / Plan: Dhruv Height / Product Type: Medicaid HMO /   3 of 24 visits, PN due 2018

## 2018-03-12 ENCOUNTER — TELEPHONE (OUTPATIENT)
Dept: INTERNAL MEDICINE CLINIC | Facility: CLINIC | Age: 33
End: 2018-03-12

## 2018-03-12 DIAGNOSIS — N18.30 ACUTE RENAL FAILURE WITH ACUTE TUBULAR NECROSIS SUPERIMPOSED ON STAGE 3 CHRONIC KIDNEY DISEASE (HCC): ICD-10-CM

## 2018-03-12 DIAGNOSIS — H46.9 OPTIC NEURITIS DUE TO MULTIPLE SCLEROSIS (HCC): ICD-10-CM

## 2018-03-12 DIAGNOSIS — H53.30 BINOCULAR VISUAL DISTURBANCE: ICD-10-CM

## 2018-03-12 DIAGNOSIS — E55.9 VITAMIN D DEFICIENCY: ICD-10-CM

## 2018-03-12 DIAGNOSIS — R79.89 AZOTEMIA: ICD-10-CM

## 2018-03-12 DIAGNOSIS — E72.11 HYPERHOMOCYSTEINEMIA (HCC): ICD-10-CM

## 2018-03-12 DIAGNOSIS — E10.21 TYPE 1 DIABETES MELLITUS WITH DIABETIC NEPHROPATHY, WITH LONG-TERM CURRENT USE OF INSULIN (HCC): ICD-10-CM

## 2018-03-12 DIAGNOSIS — E10.21 TYPE 1 DIABETES MELLITUS WITH DIABETIC NEPHROPATHY, WITH LONG-TERM CURRENT USE OF INSULIN (HCC): Primary | Chronic | ICD-10-CM

## 2018-03-12 DIAGNOSIS — Z86.73 HISTORY OF LACUNAR CEREBROVASCULAR ACCIDENT (CVA): ICD-10-CM

## 2018-03-12 DIAGNOSIS — I63.9 CEREBROVASCULAR ACCIDENT (CVA) OF LEFT PONTINE STRUCTURE (HCC): ICD-10-CM

## 2018-03-12 DIAGNOSIS — H51.0 BINOCULAR VISION DISORDER WITH CONJUGATE GAZE PALSY: ICD-10-CM

## 2018-03-12 DIAGNOSIS — H53.8 BLURRED VISION: ICD-10-CM

## 2018-03-12 DIAGNOSIS — N17.0 ACUTE RENAL FAILURE WITH ACUTE TUBULAR NECROSIS SUPERIMPOSED ON STAGE 2 CHRONIC KIDNEY DISEASE (HCC): ICD-10-CM

## 2018-03-12 DIAGNOSIS — N18.3 ACUTE RENAL FAILURE SUPERIMPOSED ON STAGE 3 CHRONIC KIDNEY DISEASE, UNSPECIFIED ACUTE RENAL FAILURE TYPE: ICD-10-CM

## 2018-03-12 DIAGNOSIS — R11.0 NAUSEA: ICD-10-CM

## 2018-03-12 DIAGNOSIS — E83.39 HYPERPHOSPHATEMIA: ICD-10-CM

## 2018-03-12 DIAGNOSIS — N17.9 ACUTE RENAL FAILURE SUPERIMPOSED ON STAGE 3 CHRONIC KIDNEY DISEASE, UNSPECIFIED ACUTE RENAL FAILURE TYPE: ICD-10-CM

## 2018-03-12 DIAGNOSIS — I63.9 CEREBROVASCULAR ACCIDENT (CVA), UNSPECIFIED MECHANISM (HCC): ICD-10-CM

## 2018-03-12 DIAGNOSIS — G35 OPTIC NEURITIS DUE TO MULTIPLE SCLEROSIS (HCC): ICD-10-CM

## 2018-03-12 DIAGNOSIS — N18.2 ACUTE RENAL FAILURE WITH ACUTE TUBULAR NECROSIS SUPERIMPOSED ON STAGE 2 CHRONIC KIDNEY DISEASE (HCC): ICD-10-CM

## 2018-03-12 DIAGNOSIS — I10 ESSENTIAL HYPERTENSION: ICD-10-CM

## 2018-03-12 DIAGNOSIS — N17.0 ACUTE RENAL FAILURE WITH ACUTE TUBULAR NECROSIS SUPERIMPOSED ON STAGE 3 CHRONIC KIDNEY DISEASE (HCC): ICD-10-CM

## 2018-03-12 DIAGNOSIS — F33.9 EPISODE OF RECURRENT MAJOR DEPRESSIVE DISORDER, UNSPECIFIED DEPRESSION EPISODE SEVERITY (HCC): Primary | ICD-10-CM

## 2018-03-12 DIAGNOSIS — R80.1 PERSISTENT PROTEINURIA: ICD-10-CM

## 2018-03-12 DIAGNOSIS — I16.0 HYPERTENSIVE URGENCY: ICD-10-CM

## 2018-03-12 RX ORDER — ATORVASTATIN CALCIUM 40 MG/1
40 TABLET, FILM COATED ORAL EVERY EVENING
Qty: 30 TABLET | Refills: 5 | Status: SHIPPED | OUTPATIENT
Start: 2018-03-12 | End: 2018-08-09 | Stop reason: SDUPTHER

## 2018-03-12 RX ORDER — FOLIC ACID 1 MG/1
1 TABLET ORAL DAILY
Qty: 30 TABLET | Refills: 5 | Status: SHIPPED | OUTPATIENT
Start: 2018-03-12 | End: 2018-08-09 | Stop reason: SDUPTHER

## 2018-03-12 RX ORDER — CLOPIDOGREL BISULFATE 75 MG/1
75 TABLET ORAL DAILY
Qty: 30 TABLET | Refills: 5 | Status: SHIPPED | OUTPATIENT
Start: 2018-03-12 | End: 2018-08-09 | Stop reason: SDUPTHER

## 2018-03-12 RX ORDER — HYDRALAZINE HYDROCHLORIDE 100 MG/1
100 TABLET, FILM COATED ORAL 3 TIMES DAILY
Qty: 270 TABLET | Refills: 3 | Status: ON HOLD | OUTPATIENT
Start: 2018-03-12 | End: 2018-09-17

## 2018-03-12 RX ORDER — ESCITALOPRAM OXALATE 10 MG/1
10 TABLET ORAL DAILY
Qty: 30 TABLET | Refills: 5 | Status: SHIPPED | OUTPATIENT
Start: 2018-03-12 | End: 2018-08-09 | Stop reason: SDUPTHER

## 2018-03-12 RX ORDER — TORSEMIDE 20 MG/1
20 TABLET ORAL EVERY OTHER DAY
Qty: 90 TABLET | Refills: 3 | Status: SHIPPED | OUTPATIENT
Start: 2018-03-12 | End: 2018-07-11 | Stop reason: SDUPTHER

## 2018-03-12 NOTE — TELEPHONE ENCOUNTER
Devang Gómez called about Pt  She is trying to get Lucinda Osullivan into the endocrinologist in [address], but they told her that her son will need a referral  I looked under the referral tab and saw that there is a pending referral for Endocrinology, but I am not sure if this is something that has to be reordered  Would you be able to add a referral order for the Pt  I will call back Devang Gómez once I have more information for her at [phone]  Thank you!

## 2018-03-12 NOTE — TELEPHONE ENCOUNTER
You may have to make sure the dx code is correct  It had these popups which I never saw before needing a detailed dx

## 2018-03-14 ENCOUNTER — OFFICE VISIT (OUTPATIENT)
Dept: OCCUPATIONAL THERAPY | Facility: CLINIC | Age: 33
End: 2018-03-14
Payer: COMMERCIAL

## 2018-03-14 ENCOUNTER — TELEPHONE (OUTPATIENT)
Dept: OTHER | Facility: HOSPITAL | Age: 33
End: 2018-03-14

## 2018-03-14 ENCOUNTER — LAB (OUTPATIENT)
Dept: LAB | Facility: CLINIC | Age: 33
End: 2018-03-14
Payer: COMMERCIAL

## 2018-03-14 ENCOUNTER — OFFICE VISIT (OUTPATIENT)
Dept: PHYSICAL THERAPY | Facility: CLINIC | Age: 33
End: 2018-03-14
Payer: COMMERCIAL

## 2018-03-14 DIAGNOSIS — I63.9 CEREBROVASCULAR ACCIDENT (CVA) OF LEFT PONTINE STRUCTURE (HCC): Primary | ICD-10-CM

## 2018-03-14 DIAGNOSIS — E87.5 HYPERKALEMIA: ICD-10-CM

## 2018-03-14 DIAGNOSIS — E87.5 HYPERKALEMIA: Primary | ICD-10-CM

## 2018-03-14 LAB
ANION GAP SERPL CALCULATED.3IONS-SCNC: 6 MMOL/L (ref 4–13)
BUN SERPL-MCNC: 42 MG/DL (ref 5–25)
CALCIUM SERPL-MCNC: 8.9 MG/DL (ref 8.3–10.1)
CHLORIDE SERPL-SCNC: 111 MMOL/L (ref 100–108)
CO2 SERPL-SCNC: 23 MMOL/L (ref 21–32)
CREAT SERPL-MCNC: 4.35 MG/DL (ref 0.6–1.3)
GFR SERPL CREATININE-BSD FRML MDRD: 17 ML/MIN/1.73SQ M
GLUCOSE P FAST SERPL-MCNC: 176 MG/DL (ref 65–99)
Lab: NORMAL
MTHFR GENE MUT ANL BLD/T: NORMAL
POTASSIUM SERPL-SCNC: 5.6 MMOL/L (ref 3.5–5.3)
SODIUM SERPL-SCNC: 140 MMOL/L (ref 136–145)

## 2018-03-14 PROCEDURE — 97535 SELF CARE MNGMENT TRAINING: CPT

## 2018-03-14 PROCEDURE — 97110 THERAPEUTIC EXERCISES: CPT | Performed by: PHYSICAL THERAPIST

## 2018-03-14 PROCEDURE — 97112 NEUROMUSCULAR REEDUCATION: CPT | Performed by: PHYSICAL THERAPIST

## 2018-03-14 PROCEDURE — 36415 COLL VENOUS BLD VENIPUNCTURE: CPT

## 2018-03-14 PROCEDURE — 80048 BASIC METABOLIC PNL TOTAL CA: CPT

## 2018-03-14 RX ORDER — SODIUM BICARBONATE 650 MG/1
650 TABLET ORAL 3 TIMES DAILY
Qty: 90 TABLET | Refills: 5 | Status: SHIPPED | OUTPATIENT
Start: 2018-03-14 | End: 2018-03-19 | Stop reason: SDUPTHER

## 2018-03-14 NOTE — PROGRESS NOTES
Daily Note     Today's date: 3/14/2018  Patient name: Tiffani Winter  : 1985  MRN: [id number]  Referring provider: Mira Farrar DO  Dx:   Encounter Diagnosis     ICD-10-CM    1  Cerebrovascular accident (CVA) of left pontine structure ([address] ) [address] [id number]                   Subjective: No new complaints  Denied falls, dizziness, and HA  Objective: See treatment diary below  Precautions: CVA with visual disturbances, CKD, DM type 1, HTN, falls     Daily Treatment Diary         Exercise Diary  2/26/18  2018  3/9  3/14/18     Bike 15 min  10 min  10 min L2  l2 x10 min     Treadmill             FTEO  airex 30"x3  airex 30"x3  airex 30"x3       FAEC airex 30"x3  airex 30"x3  airex 30"x3       Tandem gait     3 laps  Mini //   3 laps // bars [employer]  40'x2, arms out     Semi-tandem stance 30"x2  30"x2 Tandem 30"x2  semi  [id number] EC     HK marches 10ft // bars 4 laps no UE  10ft // bars 4 laps no UE  3 laps  in gonzalez  40'x2     STS feet staggered    10x L foot back  10x R foot back   15x L foot back  15x R foot back        Step taps from foam    20x ea 6" step    8"  2x30     Rockerboard taps A/P 15x - occ use of Ue  M/L A/P 15x - occ use of Ue  M/L  A/P 15x - occ use of Ue  M/L M/L, A/P  1 min ea     HR/TR standing 20x ea   10ft // bars 4 laps no UE  30xea        cone taps    10xea From foam  10xea  From foam  20xea     Step downs     10xea 4"  6" R/L  2x15      BWD walking with Hts, H/V        2x40' each                                                                                            Assessment: Tolerated treatment well  Patient would benefit from continued PT  Pt able to progress to gonzalez without // bars for tandem gait and Hk  Added bwd walking with Hts to challenge balance, had to complete slowly to maintain balance  Plan: Continue per plan of care  Continue with progressions from today

## 2018-03-14 NOTE — PROGRESS NOTES
I'm cc re: hyperK secondary to CKD  Left v/m with patient, rec low K diet with repeat bmp  Any other suggestions?   Thanks

## 2018-03-14 NOTE — PROGRESS NOTES
Occupational Therapy Daily Note     Today's date: 3/14/2018  Patient name: Stephanie Montana  : 1985  MRN: [id number]  Referring provider: Priscilla Talbert, DO  Dx:   Encounter Diagnosis   Name Primary?  Cerebrovascular accident (CVA) of left pontine structure ([facility] [address] ) Yes     Subjective: "I have a hard time seeing"  Objective: See treatment diary below  Assessment: Pt seen for OT treatment session focusing on activity tolerance, retrieval of letter cards in far R peripheral field w/ matching placement on letter board in far L peripheral field, retrieval of small colored blocks w/ matching letter onto letter board amidst visual clutter, reported moderate difficulty w/ task  First half of session no taping 2* per pt report utilizes glasses for distance only, denied need for close work and/or close visual clutter  Pt engaged in Rey Haddad /VM re-training worksheets w/ R eye occluded via taping for ~10 minutes, L eye occluded via taping for ~10 minutes, remaining 10 minutes no taping  Pt completed letter cancellation, trailmaking, line tangles, word find/search, noted moderate difficulty reporting "I'm not doing so good on these ones"  Pt tolerated session fairly well, reports moderate difficulty w/ /VM tasks, denies HA post taping  Pt continues to demonstrate Pt presents with visual insufficiencies  Pt unable to scan to L visual field with L eye; L eye stops at midline position; resting L esophoria, and poor binocularity  Pt to schedule an appt with neuro optometrist to further assess visual deficits  Tolerated treatment well  Patient would benefit from continued skilled OT  Recommend completion of moderate to complex line tangles, maze, figure ground via "S is for Saturday" and "Reading Readiness"      Plan: Continued skilled OT per POC with focus on /VM re-training, ocular motor re-training,      INTERVENTION COMMENTS:  Diagnosis: CVA  Precautions: Acute Kidney injury, HTN, DM  Insurance: Payor: Dhruv Height / Plan: Dhruv Height / Product Type: Medicaid HMO /   5 JZ 93 UQHPDF, PN due 03/27/2018

## 2018-03-16 ENCOUNTER — OFFICE VISIT (OUTPATIENT)
Dept: PHYSICAL THERAPY | Facility: CLINIC | Age: 33
End: 2018-03-16
Payer: COMMERCIAL

## 2018-03-16 ENCOUNTER — OFFICE VISIT (OUTPATIENT)
Dept: OCCUPATIONAL THERAPY | Facility: CLINIC | Age: 33
End: 2018-03-16
Payer: COMMERCIAL

## 2018-03-16 DIAGNOSIS — I63.9 CEREBROVASCULAR ACCIDENT (CVA) OF LEFT PONTINE STRUCTURE (HCC): Primary | ICD-10-CM

## 2018-03-16 PROCEDURE — 97110 THERAPEUTIC EXERCISES: CPT | Performed by: PHYSICAL THERAPIST

## 2018-03-16 PROCEDURE — 97112 NEUROMUSCULAR REEDUCATION: CPT | Performed by: PHYSICAL THERAPIST

## 2018-03-16 PROCEDURE — 97535 SELF CARE MNGMENT TRAINING: CPT

## 2018-03-16 NOTE — PROGRESS NOTES
Daily Note     Today's date: 3/16/2018  Patient name: Nikos Mancera  : 1985  MRN: [id number]  Referring provider: Horace Whiting DO  Dx:   Encounter Diagnosis   Name Primary?  Cerebrovascular accident (CVA) of left pontine structure ([address] ) Yes                  Subjective: "This takes a lot to focus, it jayashree have been easier 2 months ago "      Objective: See treatment diary below      Assessment: Pt tolerated treatment well  Patient would benefit from continued OT  Pt engaged in word circles puzzles with focus on attending to left, blocks placed to left), tracking in scanning clockwise and count clockwise  clockwise to trace word in [place], attend to left to retrieve blocks from high/low mat and place on OH shelf  Min difficulty to solve puzzles and and trace sequence of letters with accuracy  No noted difficulty located letters in clutter, pt attended to left during task  Line tangles and visual discrimination task with vision occluded for 10 min  alternating B eyes  No noted difficulty, pt compensated for visual deficit with head tilts  Plan: Continued skilled OT per POC with focus on ocular motor and attention to left       INTERVENTION COMMENTS:  Diagnosis: CVA  Precautions: Acute Kidney injury, HTN, DM  Insurance: Payor: Yara Holloway / Plan: Yara Holloway / Product Type: Medicaid HMO /    ANGELICA RICARDO due 2018

## 2018-03-17 ENCOUNTER — APPOINTMENT (OUTPATIENT)
Dept: LAB | Facility: CLINIC | Age: 33
End: 2018-03-17
Payer: COMMERCIAL

## 2018-03-17 DIAGNOSIS — E87.5 HYPERKALEMIA: ICD-10-CM

## 2018-03-17 LAB
ANION GAP SERPL CALCULATED.3IONS-SCNC: 8 MMOL/L (ref 4–13)
BUN SERPL-MCNC: 42 MG/DL (ref 5–25)
CALCIUM SERPL-MCNC: 8.4 MG/DL (ref 8.3–10.1)
CHLORIDE SERPL-SCNC: 110 MMOL/L (ref 100–108)
CO2 SERPL-SCNC: 23 MMOL/L (ref 21–32)
CREAT SERPL-MCNC: 4.36 MG/DL (ref 0.6–1.3)
GFR SERPL CREATININE-BSD FRML MDRD: 16 ML/MIN/1.73SQ M
GLUCOSE SERPL-MCNC: 195 MG/DL (ref 65–140)
POTASSIUM SERPL-SCNC: 6.1 MMOL/L (ref 3.5–5.3)
SODIUM SERPL-SCNC: 141 MMOL/L (ref 136–145)

## 2018-03-17 PROCEDURE — 36415 COLL VENOUS BLD VENIPUNCTURE: CPT

## 2018-03-17 PROCEDURE — 80048 BASIC METABOLIC PNL TOTAL CA: CPT

## 2018-03-18 ENCOUNTER — TELEPHONE (OUTPATIENT)
Dept: OTHER | Facility: HOSPITAL | Age: 33
End: 2018-03-18

## 2018-03-18 DIAGNOSIS — N18.4 CKD (CHRONIC KIDNEY DISEASE) STAGE 4, GFR 15-29 ML/MIN (HCC): ICD-10-CM

## 2018-03-18 DIAGNOSIS — E87.5 HYPERKALEMIA: Primary | ICD-10-CM

## 2018-03-18 RX ORDER — SODIUM POLYSTYRENE SULFONATE 15 G/60ML
15 SUSPENSION ORAL; RECTAL ONCE
Qty: 500 ML | Refills: 0 | Status: SHIPPED | OUTPATIENT
Start: 2018-03-18 | End: 2018-03-18

## 2018-03-18 NOTE — PROGRESS NOTES
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   Due to concerns for for demyelinating disorder patient underwent a course of plasmapheresis -discharged on 02/05/2018  He was readmitted on 2/10/18 with hypertensive urgency  The patient is a 75-year-old  male with a history of diabetes mellitus type 1, CKD, CVA, hyperhomocystinemia who was  Previously managed by Dr Lilia Hernandez but lost to follow-up  The first CVA, which was July 2015, 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  Kaitlin Cedillo was found to be 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 and also heterozygote for prothrombin gene mutation  In review of previous labs it appears that creatinine baseline was between 2-2 4 as far back as 2015 up until the summer of 2017  During hospitalization from 1/22/18-2/5/18 creatinine peaked at 8 3 and was down to 3 39 at discharge  When patient was readmitted on 2/10/18 creatinine was 4 03 and was down to 3 66 at discharge  Kaitlin Cedillo underwent significant adjustment of antihypertensives during second hospital visit  He was discharged on labetalol, chlorthalidone, hydralazine, nifedipine and torsemide  He also takes clonidine, as needed, if systolic blood pressure greater than 329 Systolic  Since our last visit on 2/22/2018, there has been no ER visits or hospitilizations  He has no significant lower extremity edema, no chest pain or shortness of breath   He has had frequent blood work to assess renal function and potassium levels  Despite low potassium diet and diuretic potassium levels have remained elevated  Last week he was placed on sodium bicarbonate tablets but potassium increased further requiring a dose of Kayexalate  Stat labs performed this morning with a repeat potassium 5 2 (6 1)  Additionally over the past weekend he had an episode of blurred vision associated with a decrease in blood pressure therefore labetalol was decreased to 150 mg in the morning and afternoon dose and continued with 300 mg at bedtime  ASSESSMENT and PLAN:   1  Chronic kidney disease stage IV: Former baseline creatinine 2 3-2 5  During recent hospitalizations creatinine plateaued in the mid upper threes  Following discharge creatinine increased to the mid fours but has been slowly improving and is currently down to 3 8  · CKD etiology likely hypertensive nephrosclerosis, diabetic nephropathy  · Schedule kidney smart class  2  Nephrotic range proteinuria: Microalbumin creatinine ratio 9281 mg/g in January 2018  Diabetic nephropathy suspected  · Repeat urine protein creatinine ratio   · Good blood pressure control essential although difficult  Patient is on a significant amount of medication at this time and although control is better it is still not adequate  3  Hypertension:   · Antihypertensives- labetalol 300mg TID, chlorthalidone 50mg daily, hydralazine 100mg TID, nifedipine 60mg BID, torsemide 20mg BID  Clonidine as needed for systolic blood pressure greater than 180  · Blood pressure generally the highest upon arising in the morning  On Saturday blood pressure precipitously dropped following medications and the patient reported blurred vision therefore labetalol dose was decreased  Labetalol decreased to 150 mg in the morning and afternoon, continue with 300 mg at bedtime due to morning surge of blood pressure    · Renal artery doppler evaluation-no evidence of renovascular disease on the right, unable to visualize left renal artery due to excessive bowel gas  · Previous workup- Aldosterone level less than 1 0, renin 0 288- PAC/PRA ratio 3 47   Which does not support primary aldosteronism  Metanephrine level within normal limits 36,  normetanephrine level elevated 177  TSH normal  · Blood pressures at home are generally 784-807'A systolic following morning surge  4  Hyperkalemia:    · Potassium level elevated despite low potassium diet and diuretic  Potassium level was remaining 5 6 therefore sodium bicarbonate 650 mg 3 times a day was started last week  Labs are repeated in two days and potassium level had increased to 6 1 therefore one dose of Kayexalate given  Repeat potassium level 5 2  · Continue low potassium diet  Increase sodium bicarbonate tablets to four times a day and closely monitor with weekly labs  If potassium level increases again may need to switch to Bicitra  Also consider [place]  · ? Type IV RTA  5  Anemia:  No HETAL due to CVA  Iron saturation 28%, ferritin level 222  Follow-up hematology especially in light of #8  6  Recent CVA left pontine, history of lacunar CVA  7  Hyperhomocystinemia:  Hematology follow-up  8  CKD MBD:  Check phosphorus level    Plan: Labs weekly, follow-up with Dr Bhupendra Bliss, kidney smart class  Continue home blood pressure monitoring    PATIENT INSTRUCTIONS:    Patient Instructions   · Increase sodium bicarbonate to 4 tablets a day  · Labetolol 150 mg in the morning and afternoon   Continue 300 mg at bedtime  · Labs in one week  · Low potassium diet  · Follow up in 4-6 weeks  · Torsemide 10 mg daily    OBJECTIVE:  Current Weight: Weight - Scale: 95 7 kg (211 lb)  Vitals:    03/19/18 1501 03/19/18 1528 03/19/18 1529   BP: 148/82 152/86 154/88   BP Location: Left arm Right arm Right arm   Patient Position: Sitting Sitting Standing   Cuff Size:  Standard Standard   Pulse:   82   Weight: 95 7 kg (211 lb)     Height: 5' 10" (1 778 m)      Body mass index is 30 28 kg/m²  REVIEW OF SYSTEMS:    Review of Systems   Constitutional: Negative  HENT: Negative  Respiratory: Negative  Cardiovascular: Negative  Gastrointestinal: Negative  Genitourinary: Negative  Musculoskeletal: Negative  Skin: Negative  Neurological: Negative  Hematological: Negative  Psychiatric/Behavioral: Negative  PHYSICAL EXAM:      Physical Exam   Constitutional: He is oriented to person, place, and time  He appears well-developed  No distress  HENT:   Head: Normocephalic  Mouth/Throat: Oropharynx is clear and moist    Eyes: Conjunctivae are normal  No scleral icterus  Neck: Neck supple  No JVD present  No carotid bruit   Cardiovascular: Normal rate and regular rhythm  Murmur (Soft systolic murmur) heard  Pulmonary/Chest: Effort normal and breath sounds normal  No respiratory distress  He has no wheezes  He has no rales  Abdominal: Soft  Bowel sounds are normal  He exhibits no distension  There is no tenderness  There is no rebound and no guarding  Musculoskeletal: He exhibits no edema  Neurological: He is alert and oriented to person, place, and time  Skin: Skin is warm and dry  No rash noted  No erythema  Psychiatric: He has a normal mood and affect   His behavior is normal  Judgment and thought content normal        Medications:    Current Outpatient Prescriptions:     atorvastatin (LIPITOR) 40 mg tablet, Take 1 tablet (40 mg total) by mouth every evening, Disp: 30 tablet, Rfl: 5    B-D ULTRAFINE III SHORT PEN 31G X 8 MM MISC, Inject under the skin 4 (four) times a day, Disp: 400 each, Rfl: 5    chlorthalidone (HYGROTEN) 50 MG tablet, Take 1 tablet (50 mg total) by mouth daily, Disp: 30 tablet, Rfl: 5    cloNIDine (CATAPRES) 0 1 mg tablet, Take 1 tablet (0 1 mg total) by mouth every 8 (eight) hours as needed for high blood pressure (For SBP greater then 180), Disp: 90 tablet, Rfl: 3    clopidogrel (PLAVIX) 75 mg tablet, Take 1 tablet (75 mg total) by mouth daily, Disp: 30 tablet, Rfl: 5    ergocalciferol (VITAMIN D2) 50,000 units, Take 1 capsule (50,000 Units total) by mouth once a week for 10 doses, Disp: 8 capsule, Rfl: 0    escitalopram (LEXAPRO) 10 mg tablet, Take 1 tablet (10 mg total) by mouth daily, Disp: 30 tablet, Rfl: 5    folic acid (FOLVITE) 1 mg tablet, Take 1 tablet (1 mg total) by mouth daily, Disp: 30 tablet, Rfl: 5    hydrALAZINE (APRESOLINE) 100 MG tablet, Take 1 tablet (100 mg total) by mouth 3 (three) times a day, Disp: 270 tablet, Rfl: 3    insulin glargine (LANTUS) 100 units/mL subcutaneous injection, Inject 8 Units under the skin daily at bedtime, Disp: 10 mL, Rfl: 0    insulin glargine (LANTUS) 100 units/mL subcutaneous injection, Inject 8 Units under the skin daily with breakfast, Disp: 10 mL, Rfl: 0    insulin lispro (HumaLOG) 100 units/mL injection, Inject 5 Units under the skin 3 (three) times a day with meals U-100 pen, Disp: 10 mL, Rfl: 0    labetalol (NORMODYNE) 300 mg tablet, Take 1 tablet (300 mg total) by mouth every 8 (eight) hours (Patient taking differently: Take 300 mg by mouth every 8 (eight) hours  ), Disp: 270 tablet, Rfl: 3    NIFEdipine ER (ADALAT CC) 60 MG 24 hr tablet, Take 1 tablet (60 mg total) by mouth 2 (two) times a day, Disp: 180 tablet, Rfl: 3    ondansetron (ZOFRAN) 4 mg tablet, Take 1 tablet (4 mg total) by mouth every 8 (eight) hours as needed for nausea or vomiting, Disp: 20 tablet, Rfl: 0    sodium bicarbonate 650 mg tablet, Take 1 tablet (650 mg total) by mouth 3 (three) times a day, Disp: 90 tablet, Rfl: 5    torsemide (DEMADEX) 20 mg tablet, Take 1 tablet (20 mg total) by mouth every other day, Disp: 90 tablet, Rfl: 3    Laboratory Results:    Results from last 7 days  Lab Units 03/19/18  1022 03/17/18  1147 03/14/18  0958   SODIUM mmol/L 141 141 140   POTASSIUM mmol/L 5 2 6 1* 5 6*   CHLORIDE mmol/L 108 110* 111*   CO2 mmol/L 23 23 23   BUN mg/dL 35* 42* 42*   CREATININE mg/dL 3 84* 4 36* 4 35*   CALCIUM mg/dL 8 8 8 4 8 9   GLUCOSE RANDOM mg/dL 212* 195*  --        Results for orders placed or performed in visit on 78/80/72   Basic metabolic panel   Result Value Ref Range    Sodium 141 136 - 145 mmol/L    Potassium 5 2 3 5 - 5 3 mmol/L    Chloride 108 100 - 108 mmol/L    CO2 23 21 - 32 mmol/L    Anion Gap 10 4 - 13 mmol/L    BUN 35 (H) 5 - 25 mg/dL    Creatinine 3 84 (H) 0 60 - 1 30 mg/dL    Glucose 212 (H) 65 - 140 mg/dL    Calcium 8 8 8 3 - 10 1 mg/dL    eGFR 19 ml/min/1 73sq m

## 2018-03-19 ENCOUNTER — LAB (OUTPATIENT)
Dept: LAB | Facility: CLINIC | Age: 33
End: 2018-03-19
Payer: COMMERCIAL

## 2018-03-19 ENCOUNTER — OFFICE VISIT (OUTPATIENT)
Dept: NEPHROLOGY | Facility: CLINIC | Age: 33
End: 2018-03-19
Payer: COMMERCIAL

## 2018-03-19 VITALS
WEIGHT: 211 LBS | SYSTOLIC BLOOD PRESSURE: 154 MMHG | BODY MASS INDEX: 30.21 KG/M2 | HEART RATE: 82 BPM | HEIGHT: 70 IN | DIASTOLIC BLOOD PRESSURE: 88 MMHG

## 2018-03-19 DIAGNOSIS — N18.30 CKD (CHRONIC KIDNEY DISEASE), STAGE III (HCC): ICD-10-CM

## 2018-03-19 DIAGNOSIS — E87.5 HYPERKALEMIA: ICD-10-CM

## 2018-03-19 DIAGNOSIS — N18.30 CKD (CHRONIC KIDNEY DISEASE), STAGE III (HCC): Primary | ICD-10-CM

## 2018-03-19 DIAGNOSIS — I10 ACCELERATED HYPERTENSION: ICD-10-CM

## 2018-03-19 LAB
ANION GAP SERPL CALCULATED.3IONS-SCNC: 10 MMOL/L (ref 4–13)
BUN SERPL-MCNC: 35 MG/DL (ref 5–25)
CALCIUM SERPL-MCNC: 8.8 MG/DL (ref 8.3–10.1)
CHLORIDE SERPL-SCNC: 108 MMOL/L (ref 100–108)
CO2 SERPL-SCNC: 23 MMOL/L (ref 21–32)
CREAT SERPL-MCNC: 3.84 MG/DL (ref 0.6–1.3)
GFR SERPL CREATININE-BSD FRML MDRD: 19 ML/MIN/1.73SQ M
GLUCOSE SERPL-MCNC: 212 MG/DL (ref 65–140)
POTASSIUM SERPL-SCNC: 5.2 MMOL/L (ref 3.5–5.3)
SODIUM SERPL-SCNC: 141 MMOL/L (ref 136–145)

## 2018-03-19 PROCEDURE — 99214 OFFICE O/P EST MOD 30 MIN: CPT | Performed by: NURSE PRACTITIONER

## 2018-03-19 PROCEDURE — 80048 BASIC METABOLIC PNL TOTAL CA: CPT

## 2018-03-19 PROCEDURE — 36415 COLL VENOUS BLD VENIPUNCTURE: CPT

## 2018-03-19 RX ORDER — CHLORTHALIDONE 50 MG/1
50 TABLET ORAL DAILY
Qty: 30 TABLET | Refills: 0 | Status: SHIPPED | OUTPATIENT
Start: 2018-03-19 | End: 2018-07-18

## 2018-03-19 RX ORDER — LABETALOL 300 MG/1
300 TABLET, FILM COATED ORAL EVERY 8 HOURS SCHEDULED
Qty: 60 TABLET | Refills: 5 | Status: SHIPPED | OUTPATIENT
Start: 2018-03-19 | End: 2018-04-12 | Stop reason: SDUPTHER

## 2018-03-19 RX ORDER — SODIUM BICARBONATE 650 MG/1
650 TABLET ORAL 4 TIMES DAILY
Qty: 90 TABLET | Refills: 5 | Status: SHIPPED | OUTPATIENT
Start: 2018-03-19 | End: 2018-04-04 | Stop reason: SDUPTHER

## 2018-03-19 NOTE — LETTER
March 19, 2018     Radha Duque DO  [address]  [address] [place] [id number]    Patient: Pedrito Durham   YOB: 1985   Date of Visit: 3/19/2018       Dear Dr Silvia Jackson: Thank you for referring Gio Garcia to me for evaluation  Below are my notes for this consultation  If you have questions, please do not hesitate to call me  I look forward to following your patient along with you  Sincerely,        DEISI Yusuf        CC: No Recipients  Rosario Yusuf  3/19/2018  6:39 PM  Sign at close encounter  [address]  Linda 27 y o  male MRN: [id number]  3/19/2018    Reason for Visit: Follow-up for management of chronic kidney disease and hypertension    History of present illness, interval history: It was a pleasure seeing Agustina Pendleton again in the nephrology clinic  He has had two significant hospitalizations this year  He was hospitalized with acute CVA (Also had a previous lacunar infarct), Significant LLUVIA on CKD, hypertensive urgency, 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   Due to concerns for for demyelinating disorder patient underwent a course of plasmapheresis -discharged on 02/05/2018  He was readmitted on 2/10/18 with hypertensive urgency  The patient is a 78-year-old  male with a history of diabetes mellitus type 1, CKD, CVA, hyperhomocystinemia who was  Previously managed by Dr Brenna Whipple but lost to follow-up  The first CVA, which was July 2015, 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  Agustina Pendleton was found to be 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 and also heterozygote for prothrombin gene mutation  In review of previous labs it appears that creatinine baseline was between 2-2 4 as far back as 2015 up until the summer of 2017  During hospitalization from 1/22/18-2/5/18 creatinine peaked at 8 3 and was down to 3 39 at discharge    When patient was readmitted on 2/10/18 creatinine was 4 03 and was down to 3 66 at discharge  Taft Primrose underwent significant adjustment of antihypertensives during second hospital visit  He was discharged on labetalol, chlorthalidone, hydralazine, nifedipine and torsemide  He also takes clonidine, as needed, if systolic blood pressure greater than 173 Systolic  Since our last visit on 2/22/2018, there has been no ER visits or hospitilizations  He has no significant lower extremity edema, no chest pain or shortness of breath  He has had frequent blood work to assess renal function and potassium levels  Despite low potassium diet and diuretic potassium levels have remained elevated  Last week he was placed on sodium bicarbonate tablets but potassium increased further requiring a dose of Kayexalate  Stat labs performed this morning with a repeat potassium 5 2 (6 1)  Additionally over the past weekend he had an episode of blurred vision associated with a decrease in blood pressure therefore labetalol was decreased to 150 mg in the morning and afternoon dose and continued with 300 mg at bedtime  ASSESSMENT and PLAN:   1  Chronic kidney disease stage IV: Former baseline creatinine 2 3-2 5  During recent hospitalizations creatinine plateaued in the mid upper threes  Following discharge creatinine increased to the mid fours but has been slowly improving and is currently down to 3 8  · CKD etiology likely hypertensive nephrosclerosis, diabetic nephropathy  · Schedule kidney smart class  2  Nephrotic range proteinuria: Microalbumin creatinine ratio 9281 mg/g in January 2018  Diabetic nephropathy suspected  · Repeat urine protein creatinine ratio   · Good blood pressure control essential although difficult  Patient is on a significant amount of medication at this time and although control is better it is still not adequate  3   Hypertension:   · Antihypertensives- labetalol 300mg TID, chlorthalidone 50mg daily, hydralazine 100mg TID, nifedipine 60mg BID, torsemide 20mg BID  Clonidine as needed for systolic blood pressure greater than 180  · Blood pressure generally the highest upon arising in the morning  On Saturday blood pressure precipitously dropped following medications and the patient reported blurred vision therefore labetalol dose was decreased  Labetalol decreased to 150 mg in the morning and afternoon, continue with 300 mg at bedtime due to morning surge of blood pressure  · Renal artery doppler evaluation-no evidence of renovascular disease on the right, unable to visualize left renal artery due to excessive bowel gas  · Previous workup- Aldosterone level less than 1 0, renin 0 288- PAC/PRA ratio 3 47   Which does not support primary aldosteronism  Metanephrine level within normal limits 36,  normetanephrine level elevated 177  TSH normal  · Blood pressures at home are generally 114-995'S systolic following morning surge  4  Hyperkalemia:    · Potassium level elevated despite low potassium diet and diuretic  Potassium level was remaining 5 6 therefore sodium bicarbonate 650 mg 3 times a day was started last week  Labs are repeated in two days and potassium level had increased to 6 1 therefore one dose of Kayexalate given  Repeat potassium level 5 2  · Continue low potassium diet  Increase sodium bicarbonate tablets to four times a day and closely monitor with weekly labs  If potassium level increases again may need to switch to Bicitra  Also consider Veltassa  · ? Type IV RTA  5  Anemia:  No HETAL due to CVA  Iron saturation 28%, ferritin level 222  Follow-up hematology especially in light of #8  6  Recent CVA left pontine, history of lacunar CVA  7  Hyperhomocystinemia:  Hematology follow-up  8  CKD MBD:  Check phosphorus level    Plan: Labs weekly, follow-up with Dr Andrew Snow, kidney smart class   Continue home blood pressure monitoring    PATIENT INSTRUCTIONS:    Patient Instructions   · Increase sodium bicarbonate to 4 tablets a day  · Labetolol 150 mg in the morning and afternoon  Continue 300 mg at bedtime  · Labs in one week  · Low potassium diet  · Follow up in 4-6 weeks  · Torsemide 10 mg daily    OBJECTIVE:  Current Weight: Weight - Scale: 95 7 kg (211 lb)  Vitals:    03/19/18 1501 03/19/18 1528 03/19/18 1529   BP: 148/82 152/86 154/88   BP Location: Left arm Right arm Right arm   Patient Position: Sitting Sitting Standing   Cuff Size:  Standard Standard   Pulse:   82   Weight: 95 7 kg (211 lb)     Height: 5' 10" (1 778 m)      Body mass index is 30 28 kg/m²  REVIEW OF SYSTEMS:    Review of Systems   Constitutional: Negative  HENT: Negative  Respiratory: Negative  Cardiovascular: Negative  Gastrointestinal: Negative  Genitourinary: Negative  Musculoskeletal: Negative  Skin: Negative  Neurological: Negative  Hematological: Negative  Psychiatric/Behavioral: Negative  PHYSICAL EXAM:      Physical Exam   Constitutional: He is oriented to person, place, and time  He appears well-developed  No distress  HENT:   Head: Normocephalic  Mouth/Throat: Oropharynx is clear and moist    Eyes: Conjunctivae are normal  No scleral icterus  Neck: Neck supple  No JVD present  No carotid bruit   Cardiovascular: Normal rate and regular rhythm  Murmur (Soft systolic murmur) heard  Pulmonary/Chest: Effort normal and breath sounds normal  No respiratory distress  He has no wheezes  He has no rales  Abdominal: Soft  Bowel sounds are normal  He exhibits no distension  There is no tenderness  There is no rebound and no guarding  Musculoskeletal: He exhibits no edema  Neurological: He is alert and oriented to person, place, and time  Skin: Skin is warm and dry  No rash noted  No erythema  Psychiatric: He has a normal mood and affect   His behavior is normal  Judgment and thought content normal        Medications:    Current Outpatient Prescriptions:     atorvastatin (LIPITOR) 40 mg tablet, Take 1 tablet (40 mg total) by mouth every evening, Disp: 30 tablet, Rfl: 5    B-D ULTRAFINE III SHORT PEN 31G X 8 MM MISC, Inject under the skin 4 (four) times a day, Disp: 400 each, Rfl: 5    chlorthalidone (HYGROTEN) 50 MG tablet, Take 1 tablet (50 mg total) by mouth daily, Disp: 30 tablet, Rfl: 5    cloNIDine (CATAPRES) 0 1 mg tablet, Take 1 tablet (0 1 mg total) by mouth every 8 (eight) hours as needed for high blood pressure (For SBP greater then 180), Disp: 90 tablet, Rfl: 3    clopidogrel (PLAVIX) 75 mg tablet, Take 1 tablet (75 mg total) by mouth daily, Disp: 30 tablet, Rfl: 5    ergocalciferol (VITAMIN D2) 50,000 units, Take 1 capsule (50,000 Units total) by mouth once a week for 10 doses, Disp: 8 capsule, Rfl: 0    escitalopram (LEXAPRO) 10 mg tablet, Take 1 tablet (10 mg total) by mouth daily, Disp: 30 tablet, Rfl: 5    folic acid (FOLVITE) 1 mg tablet, Take 1 tablet (1 mg total) by mouth daily, Disp: 30 tablet, Rfl: 5    hydrALAZINE (APRESOLINE) 100 MG tablet, Take 1 tablet (100 mg total) by mouth 3 (three) times a day, Disp: 270 tablet, Rfl: 3    insulin glargine (LANTUS) 100 units/mL subcutaneous injection, Inject 8 Units under the skin daily at bedtime, Disp: 10 mL, Rfl: 0    insulin glargine (LANTUS) 100 units/mL subcutaneous injection, Inject 8 Units under the skin daily with breakfast, Disp: 10 mL, Rfl: 0    insulin lispro (HumaLOG) 100 units/mL injection, Inject 5 Units under the skin 3 (three) times a day with meals U-100 pen, Disp: 10 mL, Rfl: 0    labetalol (NORMODYNE) 300 mg tablet, Take 1 tablet (300 mg total) by mouth every 8 (eight) hours (Patient taking differently: Take 300 mg by mouth every 8 (eight) hours  ), Disp: 270 tablet, Rfl: 3    NIFEdipine ER (ADALAT CC) 60 MG 24 hr tablet, Take 1 tablet (60 mg total) by mouth 2 (two) times a day, Disp: 180 tablet, Rfl: 3    ondansetron (ZOFRAN) 4 mg tablet, Take 1 tablet (4 mg total) by mouth every 8 (eight) hours as needed for nausea or vomiting, Disp: 20 tablet, Rfl: 0    sodium bicarbonate 650 mg tablet, Take 1 tablet (650 mg total) by mouth 3 (three) times a day, Disp: 90 tablet, Rfl: 5    torsemide (DEMADEX) 20 mg tablet, Take 1 tablet (20 mg total) by mouth every other day, Disp: 90 tablet, Rfl: 3    Laboratory Results:    Results from last 7 days  Lab Units 03/19/18  1022 03/17/18  1147 03/14/18  0958   SODIUM mmol/L 141 141 140   POTASSIUM mmol/L 5 2 6 1* 5 6*   CHLORIDE mmol/L 108 110* 111*   CO2 mmol/L 23 23 23   BUN mg/dL 35* 42* 42*   CREATININE mg/dL 3 84* 4 36* 4 35*   CALCIUM mg/dL 8 8 8 4 8 9   GLUCOSE RANDOM mg/dL 212* 195*  --        Results for orders placed or performed in visit on 02/82/63   Basic metabolic panel   Result Value Ref Range    Sodium 141 136 - 145 mmol/L    Potassium 5 2 3 5 - 5 3 mmol/L    Chloride 108 100 - 108 mmol/L    CO2 23 21 - 32 mmol/L    Anion Gap 10 4 - 13 mmol/L    BUN 35 (H) 5 - 25 mg/dL    Creatinine 3 84 (H) 0 60 - 1 30 mg/dL    Glucose 212 (H) 65 - 140 mg/dL    Calcium 8 8 8 3 - 10 1 mg/dL    eGFR 19 ml/min/1 73sq m

## 2018-03-19 NOTE — PATIENT INSTRUCTIONS
· Increase sodium bicarbonate to 4 tablets a day  · Labetolol 150 mg in the morning and afternoon   Continue 300 mg at bedtime  · Labs in one week  · Low potassium diet  · Follow up in 4-6 weeks  · Torsemide 10 mg daily  · Kidney smart class

## 2018-03-20 ENCOUNTER — TELEPHONE (OUTPATIENT)
Dept: NEUROLOGY | Facility: CLINIC | Age: 33
End: 2018-03-20

## 2018-03-20 NOTE — TELEPHONE ENCOUNTER
Cora May from Cardiology stating that the pt has an appt with them on 3/26/18, but still needs a referral placed

## 2018-03-20 NOTE — TELEPHONE ENCOUNTER
I spoke Emmanuel Flow at Cardiology and explained that per Trip Lam, Cardiology will need to request physician order from PCP  Pt has [facility] and managed care plans require orders to come from managing PCP  Emmanuel Flow to contact PCP

## 2018-03-21 ENCOUNTER — APPOINTMENT (OUTPATIENT)
Dept: OCCUPATIONAL THERAPY | Facility: CLINIC | Age: 33
End: 2018-03-21
Payer: COMMERCIAL

## 2018-03-21 ENCOUNTER — APPOINTMENT (OUTPATIENT)
Dept: PHYSICAL THERAPY | Facility: CLINIC | Age: 33
End: 2018-03-21
Payer: COMMERCIAL

## 2018-03-23 ENCOUNTER — OFFICE VISIT (OUTPATIENT)
Dept: PHYSICAL THERAPY | Facility: CLINIC | Age: 33
End: 2018-03-23
Payer: COMMERCIAL

## 2018-03-23 ENCOUNTER — OFFICE VISIT (OUTPATIENT)
Dept: OCCUPATIONAL THERAPY | Facility: CLINIC | Age: 33
End: 2018-03-23
Payer: COMMERCIAL

## 2018-03-23 DIAGNOSIS — I63.9 CEREBROVASCULAR ACCIDENT (CVA) OF LEFT PONTINE STRUCTURE (HCC): Primary | ICD-10-CM

## 2018-03-23 PROCEDURE — 97110 THERAPEUTIC EXERCISES: CPT

## 2018-03-23 PROCEDURE — 97112 NEUROMUSCULAR REEDUCATION: CPT

## 2018-03-23 PROCEDURE — 97535 SELF CARE MNGMENT TRAINING: CPT

## 2018-03-23 NOTE — PROGRESS NOTES
Daily Note     Today's date: 3/23/2018  Patient name: Alondra Lewis  : 1985  MRN: [id number]  Referring provider: Matias Jimenez DO  Dx:   Encounter Diagnosis   Name Primary?  Cerebrovascular accident (CVA) of left pontine structure ([facility] ) Yes                  Subjective: "I don't want to get too excited, but I really think I am improving "      Objective: See treatment diary below      Assessment: Tolerated treatment well  Completed card match with reacher for convergence and divergence with cards also placed to the left for increased attention  Reported blurriness and double vision  Word search with directional arrows focusing on board to table copy  Reported no HA throughout session       Plan: Continued skilled OT per POC with focus on oculomotor movements    INTERVENTION COMMENTS:  Diagnosis: Cerebrovascular accident (CVA) of left pontine structure ([facility] ) [I63 50]  Precautions: Acute Kidney injury, HTN, DM  FOTO:  6 of 24 visits, PN scheduled for

## 2018-03-23 NOTE — PROGRESS NOTES
Daily Note     Today's date: 3/23/2018  Patient name: Massimo Jimenez  : 1985  MRN: [id number]  Referring provider: Zoila Araiza DO  Dx:   Encounter Diagnosis     ICD-10-CM    1  Cerebrovascular accident (CVA) of left pontine structure ([address] ) [address] 50        Start Time: 1200  Stop Time: 1300  Total time in clinic (min): 60 minutes    Subjective: Patient with no new complaints  Report he is feeling good today  Objective: See treatment diary below  Precautions: CVA with visual disturbances, CKD, DM type 1, HTN, falls     Daily Treatment Diary         Exercise Diary  3/23  2018  3/9  3/14/18  3/16   Bike 10 min, L3  10 min  10 min L2  l2 x10 min  10 min   Treadmill            FTEO    airex 30"x3  airex 30"x3       FAEC   airex 30"x3  airex 30"x3       Tandem gait 3 laps   3 laps  Mini //   3 laps // bars [employer], arms out  3 laps   Semi-tandem stance   30"x2 Tandem 30"x2  semi  [id number] EC     HK marches 2# ankle weights 1 lap gonzalez  10ft // bars 4 laps no UE  3 laps  in gonzalez  40'x2     STS feet staggered 15x L foot back  15x R foot back from orange chair  10x L foot back  10x R foot back   15x L foot back  15x R foot back     3x10   Step taps from foam   20x ea 6" step    8"  2x30     Rockerboard taps 20xea EC A/P 15x - occ use of Ue  M/L  A/P 15x - occ use of Ue  M/L M/L, A/P  1 min ea  40x ea   Heel/toe walking  Gonzalez 1 lap ea  10ft // bars 4 laps no UE  30xea    heel walking/toe walking 3 laps ea    cone taps   10xea From foam  10xea  From foam  20xea     Step downs 6"   10xea 4"  6" R/L  2x15     fwd/bwd walking with HTs, H/V  1 lap ea      2x40' each      lunges 10x2 ea        3 laps    step ups  6" foam on floor and step  10xea        foam 2x15 ea                                                              Assessment: Tolerated treatment well with ability to progress exercises with visible LE fatigue   Patient with continued visual deficits but mild instability during dynamic balance exercises  Updated HEP with lunges, patient verbalized understanding  Patient may benefit from continued higher level balance training  Plan: Progress treatment as tolerated

## 2018-03-26 ENCOUNTER — OFFICE VISIT (OUTPATIENT)
Dept: CARDIOLOGY CLINIC | Facility: CLINIC | Age: 33
End: 2018-03-26
Payer: COMMERCIAL

## 2018-03-26 VITALS
SYSTOLIC BLOOD PRESSURE: 120 MMHG | BODY MASS INDEX: 29.82 KG/M2 | HEART RATE: 80 BPM | DIASTOLIC BLOOD PRESSURE: 52 MMHG | WEIGHT: 213 LBS | HEIGHT: 71 IN

## 2018-03-26 DIAGNOSIS — I15.0 RENOVASCULAR HYPERTENSION: ICD-10-CM

## 2018-03-26 DIAGNOSIS — E10.21 TYPE 1 DIABETES MELLITUS WITH DIABETIC NEPHROPATHY, WITH LONG-TERM CURRENT USE OF INSULIN (HCC): Chronic | ICD-10-CM

## 2018-03-26 DIAGNOSIS — N18.4 CHRONIC KIDNEY DISEASE, STAGE 4 (SEVERE) (HCC): Chronic | ICD-10-CM

## 2018-03-26 DIAGNOSIS — I63.9 CEREBROVASCULAR ACCIDENT (CVA) OF LEFT PONTINE STRUCTURE (HCC): Primary | Chronic | ICD-10-CM

## 2018-03-26 DIAGNOSIS — I51.7 LEFT ATRIAL DILATION: ICD-10-CM

## 2018-03-26 PROCEDURE — 99204 OFFICE O/P NEW MOD 45 MIN: CPT | Performed by: INTERNAL MEDICINE

## 2018-03-26 PROCEDURE — 93000 ELECTROCARDIOGRAM COMPLETE: CPT | Performed by: INTERNAL MEDICINE

## 2018-03-26 NOTE — PROGRESS NOTES
Consultation - Cardiology   Karina Mckeon 27 y o  male MRN: [id number]  Unit/Bed#:  Encounter: [id number]  Physician Requesting Consult: No att  providers found  Reason for Consult / Principal Problem: unexplained CVA    Assessment:  1  CVA x 2   2  HTN  3  IDDM  4  Hypercholesterolemia  5  CVD Stage 4    Plan:  I had a long discussion with Mr Stacy Salvador and his mother  I feel that it his previous CVAs are less likely due to a cardiac arrhythmia of srtuctural heart disease but will order a SANJEEV and ILR  There is no cardiac  indication for using Coumadin at this time  I have reviewed his medications and made no changes  History of Present Illness     HPI: Félix Steel is a 27y o  year old male who is seen for cardiac evaluation because of 2 previous CVAs  He denies any cardiac history of 2 previous TTEs have not shown any structural heart disease  He denies palpitations and no arrhythmias have ever been documented  He has HTN, IDDM with nephropathy, CKD, hypercholesterolemia  He was a smoker  He does see hematology for a blood disorder and is on Plavix        Review of Systems:    Alert awake oriented, comfortable, denies any complaints  No fevers chills nausea vomiting  No weakness, dizziness, seizures  no cough, shortness of breath, or wheezing  Denies any palpitations, chest pain, diaphoresis  Denies leg edema, pain or paresthesias  Denies any skin rashes  Denies abdominal pain, bloody stools, masses  Denies any depression or suicidal ideations      Historical Information   Past Medical History:   Diagnosis Date    [facility]     Cerebellar stroke side undetermined 2015 2013    Diabetes type 1, controlled ([facility] )     Hypertension      Past Surgical History:   Procedure Laterality Date    TONSILLECTOMY       History   Alcohol Use    Yes     Comment: socially     History   Drug Use    Frequency: 3 0 times per week    Types: Marijuana     Comment: weekly     History   Smoking Status    Former Smoker    Packs/day: 0 50    Years: 12 00    Types: Cigarettes   Smokeless Tobacco    Never Used     Comment: quit 2/2018     Family History: non-contributory    Meds/Allergies   all current active meds have been reviewed  Allergies   Allergen Reactions    Sulfa Antibiotics Rash       Objective   Vitals: Blood pressure 120/52, pulse 80, height 5' 10 5" (1 791 m), weight 96 6 kg (213 lb)  , Body mass index is 30 13 kg/m²  ,     [id number]            Physical Exam:  GEN: Hailee Grooms appears well, alert and oriented x 3, pleasant and cooperative   HEENT: pupils equal, round, and reactive to light; extraocular muscles intact  NECK: supple, no carotid bruits   HEART: regular rhythm, normal S1 and S2, no murmurs, clicks, gallops or rubs   LUNGS: clear to auscultation bilaterally; no wheezes, rales, or rhonchi   ABDOMEN: normal bowel sounds, soft, no tenderness, no distention  EXTREMITIES: peripheral pulses normal; no clubbing, cyanosis, or edema  NEURO: no focal findings   SKIN: normal without suspicious lesions on exposed skin    Lab Results:   Office Visit on 03/26/2018   Component Date Value Ref Range Status    Interpretation 03/26/2018    Final    NSR  Normal ECG  Imaging: I have personally reviewed pertinent reports        EKG: NSR, normal ECG

## 2018-03-28 ENCOUNTER — APPOINTMENT (OUTPATIENT)
Dept: LAB | Facility: CLINIC | Age: 33
End: 2018-03-28
Payer: COMMERCIAL

## 2018-03-28 ENCOUNTER — OFFICE VISIT (OUTPATIENT)
Dept: PHYSICAL THERAPY | Facility: CLINIC | Age: 33
End: 2018-03-28
Payer: COMMERCIAL

## 2018-03-28 ENCOUNTER — OFFICE VISIT (OUTPATIENT)
Dept: OCCUPATIONAL THERAPY | Facility: CLINIC | Age: 33
End: 2018-03-28
Payer: COMMERCIAL

## 2018-03-28 DIAGNOSIS — I63.9 CEREBROVASCULAR ACCIDENT (CVA) OF LEFT PONTINE STRUCTURE (HCC): Primary | ICD-10-CM

## 2018-03-28 DIAGNOSIS — N18.30 CKD (CHRONIC KIDNEY DISEASE), STAGE III (HCC): ICD-10-CM

## 2018-03-28 LAB
ANION GAP SERPL CALCULATED.3IONS-SCNC: 8 MMOL/L (ref 4–13)
BUN SERPL-MCNC: 45 MG/DL (ref 5–25)
CALCIUM SERPL-MCNC: 8.9 MG/DL (ref 8.3–10.1)
CHLORIDE SERPL-SCNC: 111 MMOL/L (ref 100–108)
CO2 SERPL-SCNC: 24 MMOL/L (ref 21–32)
CREAT SERPL-MCNC: 4.4 MG/DL (ref 0.6–1.3)
GFR SERPL CREATININE-BSD FRML MDRD: 16 ML/MIN/1.73SQ M
GLUCOSE SERPL-MCNC: 106 MG/DL (ref 65–140)
POTASSIUM SERPL-SCNC: 5.2 MMOL/L (ref 3.5–5.3)
SODIUM SERPL-SCNC: 143 MMOL/L (ref 136–145)

## 2018-03-28 PROCEDURE — 36415 COLL VENOUS BLD VENIPUNCTURE: CPT

## 2018-03-28 PROCEDURE — 97110 THERAPEUTIC EXERCISES: CPT | Performed by: PHYSICAL THERAPIST

## 2018-03-28 PROCEDURE — 97112 NEUROMUSCULAR REEDUCATION: CPT

## 2018-03-28 PROCEDURE — 97112 NEUROMUSCULAR REEDUCATION: CPT | Performed by: PHYSICAL THERAPIST

## 2018-03-28 PROCEDURE — 80048 BASIC METABOLIC PNL TOTAL CA: CPT

## 2018-03-28 PROCEDURE — 97530 THERAPEUTIC ACTIVITIES: CPT

## 2018-03-28 NOTE — PROGRESS NOTES
Daily Note     Today's date: 3/28/2018  Patient name: Radha Red  : 1985  MRN: [id number]  Referring provider: Naina Fuentes DO  Dx:   Encounter Diagnosis     ICD-10-CM    1  Cerebrovascular accident (CVA) of left pontine structure ([address] ) [address] 50                   Subjective: Feeling tired today  Objective: See treatment diary below  Precautions: CVA with visual disturbances, CKD, DM type 1, HTN, falls     Daily Treatment Diary         Exercise Diary  3/23  3/28  3/9  3/14/18  3/16   Bike 10 min, L3  10 min  10 min L2  l2 x10 min  10 min   Treadmill            FTEO    airex 30"x3  airex 30"x3       FAEC   airex 30"x3  airex 30"x3       Tandem gait 3 laps   3 laps  Mini //   3 laps // bars [employer], arms out  3 laps   Semi-tandem stance    Tandem 30"x2  semi  [id number] EC     HK marches 2# ankle weights 1 lap gonzalez  10ft // bars 4 laps no UE  3 laps  in gonzalez  40'x2     STS feet staggered 15x L foot back  15x R foot back from orange chair  foam- 30x  15x L foot back  15x R foot back     3x10   Step taps from foam   20x ea cones    8"  2x30     Rockerboard taps 20xea EC A/P/ lat 15x -   A/P 15x - occ use of Ue  M/L M/L, A/P  1 min ea  40x ea   Heel/toe walking  Gonzalez 1 lap ea   30xea    heel walking/toe walking 3 laps ea    cone taps   20x side stepping From foam  10xea  From foam  20xea     Step downs 6"   10xea 4"  6" R/L  2x15     fwd/bwd walking with HTs, H/V  1 lap ea  ` lap ea    2x40' each      lunges 10x2 ea  15xea      3 laps    step ups  6" foam on floor and step  10xea  8" foam 15x ea      foam 2x15 ea                                                              Assessment: Pt required seated rest break frequently due to fatigue in Le  Added side stepping with cone taps which patient struggled  Plan to continue at next session with balance challenges without Ue  Plan: Progress treatment as tolerated

## 2018-03-28 NOTE — PROGRESS NOTES
Daily Note     Today's date: 3/28/2018  Patient name: Jun Rivas  : 1985  MRN: [id number]  Referring provider: Khadijah Gibbons DO  Dx:   Encounter Diagnosis   Name Primary?  Cerebrovascular accident (CVA) of left pontine structure ([facility] ) Yes                  Subjective: "I don't know for sure but I feel like my vision is getting better"  Objective: See treatment diary below      Assessment: Tolerated treatment well  Patient exhibited good technique with therapeutic exercises   Pt engaged in arrow color task with focus on Binocular ROM, sustaining head in neutral, tracking and sustained convergence coloring arrows from outside in  Pt frequently missed arrows positioned to left and required verbal cues to sustain head in neutral during task  Pt reported blurriness in right eye  Supine on mat pt engaged in "add a letter" word search focusing on sustained convergence and binocular muscle strengthening/endurance  No report of difficulty with task         Plan: Continued skilled OT per POC with focus on sustained convergence/binocular ROM    INTERVENTION COMMENTS:  Diagnosis: Cerebrovascular accident (CVA) of left pontine structure ([facility] ) [I63 50]  Precautions: Acute Kidney injury, HTN, DM  7 of 24 visits, PN scheduled for

## 2018-03-29 ENCOUNTER — TELEPHONE (OUTPATIENT)
Dept: OTHER | Facility: HOSPITAL | Age: 33
End: 2018-03-29

## 2018-03-29 NOTE — TELEPHONE ENCOUNTER
I spoke with patient's mother, Chai Rucker regarding Lavina labs  Although the creatinine has bumped up into the 4s again potassium level is stable at 5 2 and Radha Golden is feeling well  I was a little concerned because sodium level is 143 so I told her to make sure that he is staying hydrated  He is going for placement of a loop recorder/SANJEEV next week  Cardiology is monitoring for PAF in light of history of CVA x2 and blood disorder    Labs next week

## 2018-03-30 ENCOUNTER — APPOINTMENT (OUTPATIENT)
Dept: PHYSICAL THERAPY | Facility: CLINIC | Age: 33
End: 2018-03-30
Payer: COMMERCIAL

## 2018-03-30 ENCOUNTER — APPOINTMENT (OUTPATIENT)
Dept: OCCUPATIONAL THERAPY | Facility: CLINIC | Age: 33
End: 2018-03-30
Payer: COMMERCIAL

## 2018-04-02 ENCOUNTER — APPOINTMENT (OUTPATIENT)
Dept: PHYSICAL THERAPY | Facility: CLINIC | Age: 33
End: 2018-04-02
Payer: COMMERCIAL

## 2018-04-02 ENCOUNTER — APPOINTMENT (OUTPATIENT)
Dept: OCCUPATIONAL THERAPY | Facility: CLINIC | Age: 33
End: 2018-04-02
Payer: COMMERCIAL

## 2018-04-02 DIAGNOSIS — H53.8 BLURRED VISION: ICD-10-CM

## 2018-04-02 DIAGNOSIS — N17.0 ACUTE RENAL FAILURE WITH ACUTE TUBULAR NECROSIS SUPERIMPOSED ON STAGE 2 CHRONIC KIDNEY DISEASE (HCC): ICD-10-CM

## 2018-04-02 DIAGNOSIS — G35 OPTIC NEURITIS DUE TO MULTIPLE SCLEROSIS (HCC): ICD-10-CM

## 2018-04-02 DIAGNOSIS — R79.89 AZOTEMIA: ICD-10-CM

## 2018-04-02 DIAGNOSIS — H51.0 BINOCULAR VISION DISORDER WITH CONJUGATE GAZE PALSY: ICD-10-CM

## 2018-04-02 DIAGNOSIS — E10.21 TYPE 1 DIABETES MELLITUS WITH DIABETIC NEPHROPATHY, WITH LONG-TERM CURRENT USE OF INSULIN (HCC): ICD-10-CM

## 2018-04-02 DIAGNOSIS — N18.2 ACUTE RENAL FAILURE WITH ACUTE TUBULAR NECROSIS SUPERIMPOSED ON STAGE 2 CHRONIC KIDNEY DISEASE (HCC): ICD-10-CM

## 2018-04-02 DIAGNOSIS — R11.0 NAUSEA: ICD-10-CM

## 2018-04-02 DIAGNOSIS — I63.9 CEREBROVASCULAR ACCIDENT (CVA), UNSPECIFIED MECHANISM (HCC): ICD-10-CM

## 2018-04-02 DIAGNOSIS — N17.0 ACUTE RENAL FAILURE WITH ACUTE TUBULAR NECROSIS SUPERIMPOSED ON STAGE 3 CHRONIC KIDNEY DISEASE (HCC): ICD-10-CM

## 2018-04-02 DIAGNOSIS — E55.9 VITAMIN D DEFICIENCY: ICD-10-CM

## 2018-04-02 DIAGNOSIS — H46.9 OPTIC NEURITIS DUE TO MULTIPLE SCLEROSIS (HCC): ICD-10-CM

## 2018-04-02 DIAGNOSIS — R80.1 PERSISTENT PROTEINURIA: ICD-10-CM

## 2018-04-02 DIAGNOSIS — I63.9 CEREBROVASCULAR ACCIDENT (CVA) OF LEFT PONTINE STRUCTURE (HCC): ICD-10-CM

## 2018-04-02 DIAGNOSIS — H53.30 BINOCULAR VISUAL DISTURBANCE: ICD-10-CM

## 2018-04-02 DIAGNOSIS — E83.39 HYPERPHOSPHATEMIA: ICD-10-CM

## 2018-04-02 DIAGNOSIS — N18.30 ACUTE RENAL FAILURE WITH ACUTE TUBULAR NECROSIS SUPERIMPOSED ON STAGE 3 CHRONIC KIDNEY DISEASE (HCC): ICD-10-CM

## 2018-04-02 DIAGNOSIS — I16.0 HYPERTENSIVE URGENCY: ICD-10-CM

## 2018-04-02 DIAGNOSIS — Z86.73 HISTORY OF LACUNAR CEREBROVASCULAR ACCIDENT (CVA): ICD-10-CM

## 2018-04-02 DIAGNOSIS — E72.11 HYPERHOMOCYSTEINEMIA (HCC): ICD-10-CM

## 2018-04-02 RX ORDER — INSULIN GLARGINE 100 [IU]/ML
8 INJECTION, SOLUTION SUBCUTANEOUS
Qty: 10 ML | Refills: 0 | Status: SHIPPED | OUTPATIENT
Start: 2018-04-02 | End: 2018-08-09 | Stop reason: SDUPTHER

## 2018-04-02 RX ORDER — INSULIN GLARGINE 100 [IU]/ML
8 INJECTION, SOLUTION SUBCUTANEOUS
Qty: 10 ML | Refills: 2 | Status: SHIPPED | OUTPATIENT
Start: 2018-04-02 | End: 2018-05-01 | Stop reason: SDUPTHER

## 2018-04-03 DIAGNOSIS — E55.9 VITAMIN D DEFICIENCY: ICD-10-CM

## 2018-04-03 DIAGNOSIS — N18.2 ACUTE RENAL FAILURE WITH ACUTE TUBULAR NECROSIS SUPERIMPOSED ON STAGE 2 CHRONIC KIDNEY DISEASE (HCC): ICD-10-CM

## 2018-04-03 DIAGNOSIS — H53.30 BINOCULAR VISUAL DISTURBANCE: ICD-10-CM

## 2018-04-03 DIAGNOSIS — N17.0 ACUTE RENAL FAILURE WITH ACUTE TUBULAR NECROSIS SUPERIMPOSED ON STAGE 2 CHRONIC KIDNEY DISEASE (HCC): ICD-10-CM

## 2018-04-03 DIAGNOSIS — R11.0 NAUSEA: ICD-10-CM

## 2018-04-03 DIAGNOSIS — E83.39 HYPERPHOSPHATEMIA: ICD-10-CM

## 2018-04-03 DIAGNOSIS — R80.1 PERSISTENT PROTEINURIA: ICD-10-CM

## 2018-04-03 DIAGNOSIS — H46.9 OPTIC NEURITIS DUE TO MULTIPLE SCLEROSIS (HCC): ICD-10-CM

## 2018-04-03 DIAGNOSIS — E72.11 HYPERHOMOCYSTEINEMIA (HCC): ICD-10-CM

## 2018-04-03 DIAGNOSIS — N17.0 ACUTE RENAL FAILURE WITH ACUTE TUBULAR NECROSIS SUPERIMPOSED ON STAGE 3 CHRONIC KIDNEY DISEASE (HCC): ICD-10-CM

## 2018-04-03 DIAGNOSIS — H51.0 BINOCULAR VISION DISORDER WITH CONJUGATE GAZE PALSY: ICD-10-CM

## 2018-04-03 DIAGNOSIS — E10.21 TYPE 1 DIABETES MELLITUS WITH DIABETIC NEPHROPATHY, WITH LONG-TERM CURRENT USE OF INSULIN (HCC): ICD-10-CM

## 2018-04-03 DIAGNOSIS — I63.9 CEREBROVASCULAR ACCIDENT (CVA) OF LEFT PONTINE STRUCTURE (HCC): ICD-10-CM

## 2018-04-03 DIAGNOSIS — G35 OPTIC NEURITIS DUE TO MULTIPLE SCLEROSIS (HCC): ICD-10-CM

## 2018-04-03 DIAGNOSIS — I16.0 HYPERTENSIVE URGENCY: ICD-10-CM

## 2018-04-03 DIAGNOSIS — I63.9 CEREBROVASCULAR ACCIDENT (CVA), UNSPECIFIED MECHANISM (HCC): ICD-10-CM

## 2018-04-03 DIAGNOSIS — N18.30 ACUTE RENAL FAILURE WITH ACUTE TUBULAR NECROSIS SUPERIMPOSED ON STAGE 3 CHRONIC KIDNEY DISEASE (HCC): ICD-10-CM

## 2018-04-03 DIAGNOSIS — Z86.73 HISTORY OF LACUNAR CEREBROVASCULAR ACCIDENT (CVA): ICD-10-CM

## 2018-04-03 DIAGNOSIS — H53.8 BLURRED VISION: ICD-10-CM

## 2018-04-03 DIAGNOSIS — R79.89 AZOTEMIA: ICD-10-CM

## 2018-04-03 NOTE — PROGRESS NOTES
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,      Binocular visual disturbance    Vitamin D deficiency    Hyperhomocysteinemia ([facility] )    Hyperkalemia    Anemia in stage 3 chronic kidney disease    Chronic kidney disease, stage 4 (severe) (HCC)    Persistent proteinuria    Bilateral leg edema    Ataxia    Esophagitis    Gastritis    Left atrial dilation    Renovascular hypertension     Past Medical Hx:   Past Medical History:   Diagnosis Date    Acute kidney injury ([facility] )     Cerebellar stroke side undetermined 2015    Diabetes type 1, controlled ([facility] )     Hypertension      Past Surgical Hx:   Past Surgical History:   Procedure Laterality Date    TONSILLECTOMY          Pain Levels:   Resting:  {gen number 5-74:590667}    With Activity:  {gen number 5-60:542777}    Subjective/Patient Goal: "***"    History of Present Illness  Pt is a pleasant, active, unemployed 27 y o  male seen for OT cele s/p referred to [address] [place] [address] s/p d/[email or web address] s/p adm w/ with pmh of CVA, HTN, and DM type 1, who presented to the ED on 18  with bilateral right gaze palsy and admitted as a stroke alert with Neuroimaging completed  CTA of head and neck negative for acute large vessel disease   MRI of the brain (with and without contrast) was obtained revealing diffusion abnormality in the posterior velia   Pt w/ h/o 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: likely related to CVA  vs CNS demyelination  Continues with right b/l gaze palsy despite treatment with steroids and plasmapheresis  MS panel negative with myelin Basic protein 12 2 and zero oligo bands observed in CSF  Homocysteine level to 17 4 on 1/22/18  Also w/ CVA of left pontine structure with h/o stroke in 2015  Anticoagulation workup revealed Thrombosis panel with prothrombin gene mutation single [id number] in factor II (prothrombin), comorbidities as listed above  Pt seen by OT/PT in [facility] recommended outpt OT/PT, now seen for progress note/status update  Lifestyle Performance Model:  Autonomy: Pt was I w/ I/ADLs, has not returned to driving since CVA, and required no use of DME PTA  Reciprocal Relationships: Supportive parents pt lives w/ are able to A as needed  Service to Others: Pt is presently unemployed s/p CVA's was working prior as an [employer]  Intrinsic Gratification: Enjoys woodworking and "classic" music  Home Setup: Pt lives in a [facility] w/ 2nd floor bedroom/bathroom w/ first floor 1/2 bathroom w/ 2 NIRU in [place]    Objective  Impairments Section:     UE Strength:   ANGEL: RUE: /200 LUE: /200   PINCER: 3 point pinch: RUE , LUE:   2 point pinch: RUE:  , LUE:   Lateral pincher: RUE: , LUE:     Coordination:   9 HOLE PEG TEST:     RUE:  seconds, LUE:  Seconds    Range of Motion:  AROM:   Shoulder elevation:    Shoulder FF:   Shoulder ABD:   ER/IR:   Elbow ext/flex:   Sup/Pron:   Wrist flex/ext:   Composite:   Hook:   Opposition:   Finger to nose:   Dysdiadochokinesia:    PROM:   Shoulder elevation:    Shoulder FF:   Shoulder ABD:   ER/IR:   Elbow ext/flex:   Sup/Pron:   Wrist flex/ext:    Sensation:  MYOFILAMENTS:   RUE:   LUE:    Visual Perceptual and Functional Cognition:  1  Convergence Insufficiency Symptom Survey (CISS): /60    2   Concussion Cognitive Checklist: self report symptom checklist  *Patient indicated that {He/she (caps):01530} is experiencing the following symptoms:    · Memory: {TDFQPV:97808}    · Attention: {ATTENTION:12047}    · Processing: {PROCESSIN}    · Executive Functions: {EXECUTIVE FUNCTIONS:88684}    · Communication: {COMMUNICATION:98437}    · Visual: {VISUAL:31860}    · Emotional: {EMOTIONAL:80139}    · Increased Sensitivities to: {INCREASED SENSITIVITIES TO:67517}     3  Contextual Memory Test (CMT): Pt reports has*** noticed a change in *** memory, rates her memory capacity at ***%, if studied 20 objects for 90 seconds would recall *** of them, would have recalled *** of them pre injury, frequently forgets things that happened the day before ***% of the time, forgets important details ***% of the time, frequently forgets things people have told her ***% of the time, frequently forgets things that happened a few minutes ago ***% of the time, would*** remember facts about this form a week from now  IMMEDIATE RECALL:   ***/20  score falls  in *** deficit range    DELAYED RECALL:  ***/20 score falls in *** deficit range   RECOGNITION:  ***/20 with *** confabulations resulting in score ***/20     4  Song Cognitive Assessment Version 8 1 (MoCA V8 1)  Visuospatial/executive functioning: /5  Naming: /3  Memory: 1st trial: , 2nd trial:   Attention/concentration: /2  List of letters:   Serial Seven Subtraction: 3 w/ *** errors  Language/sentence repetition: /2  Language Fluency: /  Abstract/Correlational Thinking: 2  Delayed Recall: 5  Orientation: /6   Memory Index Score: 15  MoCA V1 8 1 Raw Score: /30, MIS: /15, indicative of *** neurocognitive impairments  5  Vision Screening Recording Form:   vision screen:   near acuity:    binocularity far:  red green fusion:   near point of convergence:   perlita string:   pursuits:   saccades:   ocular ROM:   Visual Perceptual Midline shift:      6  Anxiety/Depression:  Pt engaged in the Neuro QOL Anxiety Short Form and Neuro QOL Depression Short Form in order to screen for anxiety and depressive s/s   Pt reported the following:    Anxiety- Short Form:   --used to measure anxious s/s  For scoring purposes, scores of 25+ indicate the threshold for treatment per neurology/SLIM  Score:***/40  Pt most often chose the response *** when asked about feeling anxious s/s      Depression- Short Form:   --used to measure depressive s/s  For scoring purposes, scores of 25+ indicate the threshold for treatment per neurology/SLIM  Score:***/40  Pt most often chose the response *** when asked about feeling depressive s/s  Assessment/Plan  Occupational Therapy Skilled Analysis Assessment and Plan of Care:  Pt requires overall mod I for ADLs/self care and mod I for fx'l mobility w/ *** DME  Pt is currently demonstrating the following occupational deficits: limited 2* ***  The following Occupational Performance Areas to address include: {tsoccupationalperformanceareas:73153}  Based on the aforementioned OT evaluation, functional performance deficits, and assessments, pt has been identified as a *** complexity evaluation  Pt to continue to benefit from outpatient skilled OT services to address the following goals *** to  w/in *** with special focus on self-care management, pt education,  and VM training as well as motor training to improve above defiicits and enhance overall QOL/function  Goals:  Short Term Goals:  - Pt will increase oculomotor control for improved saccades, con/divergent tasks for improved reading, board to table tasks with minimal increase in symptoms 4 weeks -PARTIALLY MET  - Pt will increased L eye pursuits and ROM to L visual field x 50% for improved binocularity x 4 weeks   -PARTIALLY MET    Long Term Goals:  -Pt will increase oculomotor control for improved dynamic activities with head turns, board/screen to table tasks symptom free, improved VMI and return to baseline handwriting-PARTIALLY MET  - Pt will increase oculomotor control for WNL saccades, con/divergent tasks symptom free for improved QOL-PARTIALLY MET  -Pt will increased L eye pursuits and ROM to L visual field x 75% for improved binocularity-PARTIALLY MET    INTERVENTION COMMENTS:  Diagnosis: Posterior Daisy CVA  Precautions: LLUVIA, HTN, conjugate gaze  FOTO: ***  Insurance: Chris Jaime [[id number]]  8 of 24 visits, PN due 5/4/2018    Thank you for the consult!   Please call if you have any questions: [phone]  Sumanth Barrera, OTD, OTR/L, C-GCM, CSRS  Director of Outpatient Neuro Occupational Therapy

## 2018-04-04 ENCOUNTER — APPOINTMENT (OUTPATIENT)
Dept: LAB | Facility: CLINIC | Age: 33
End: 2018-04-04
Payer: COMMERCIAL

## 2018-04-04 ENCOUNTER — APPOINTMENT (OUTPATIENT)
Dept: PHYSICAL THERAPY | Facility: CLINIC | Age: 33
End: 2018-04-04
Payer: COMMERCIAL

## 2018-04-04 ENCOUNTER — APPOINTMENT (OUTPATIENT)
Dept: OCCUPATIONAL THERAPY | Facility: CLINIC | Age: 33
End: 2018-04-04
Payer: COMMERCIAL

## 2018-04-04 ENCOUNTER — TELEPHONE (OUTPATIENT)
Dept: CARDIOLOGY CLINIC | Facility: CLINIC | Age: 33
End: 2018-04-04

## 2018-04-04 DIAGNOSIS — N18.30 CKD (CHRONIC KIDNEY DISEASE), STAGE III (HCC): ICD-10-CM

## 2018-04-04 DIAGNOSIS — E87.5 HYPERKALEMIA: ICD-10-CM

## 2018-04-04 LAB
ANION GAP SERPL CALCULATED.3IONS-SCNC: 9 MMOL/L (ref 4–13)
BUN SERPL-MCNC: 36 MG/DL (ref 5–25)
CALCIUM SERPL-MCNC: 8.8 MG/DL (ref 8.3–10.1)
CHLORIDE SERPL-SCNC: 103 MMOL/L (ref 100–108)
CO2 SERPL-SCNC: 28 MMOL/L (ref 21–32)
CREAT SERPL-MCNC: 4.55 MG/DL (ref 0.6–1.3)
GFR SERPL CREATININE-BSD FRML MDRD: 16 ML/MIN/1.73SQ M
GLUCOSE SERPL-MCNC: 208 MG/DL (ref 65–140)
POTASSIUM SERPL-SCNC: 4.8 MMOL/L (ref 3.5–5.3)
SODIUM SERPL-SCNC: 140 MMOL/L (ref 136–145)

## 2018-04-04 PROCEDURE — 80048 BASIC METABOLIC PNL TOTAL CA: CPT

## 2018-04-04 PROCEDURE — 36415 COLL VENOUS BLD VENIPUNCTURE: CPT

## 2018-04-04 RX ORDER — SODIUM BICARBONATE 650 MG/1
650 TABLET ORAL 4 TIMES DAILY
Qty: 120 TABLET | Refills: 6 | Status: SHIPPED | OUTPATIENT
Start: 2018-04-04 | End: 2019-03-05

## 2018-04-04 NOTE — TELEPHONE ENCOUNTER
Pts Mom called stating that pt is feeling dizzy, sore throat, and vomiting and asked if Loop for tomorrow can be postponed  Pt will call back to reschedule when he is feeling better

## 2018-04-09 ENCOUNTER — TELEPHONE (OUTPATIENT)
Dept: OTHER | Facility: HOSPITAL | Age: 33
End: 2018-04-09

## 2018-04-09 ENCOUNTER — EVALUATION (OUTPATIENT)
Dept: OCCUPATIONAL THERAPY | Facility: CLINIC | Age: 33
End: 2018-04-09
Payer: COMMERCIAL

## 2018-04-09 ENCOUNTER — OFFICE VISIT (OUTPATIENT)
Dept: PHYSICAL THERAPY | Facility: CLINIC | Age: 33
End: 2018-04-09
Payer: COMMERCIAL

## 2018-04-09 VITALS — SYSTOLIC BLOOD PRESSURE: 180 MMHG | DIASTOLIC BLOOD PRESSURE: 94 MMHG | HEART RATE: 84 BPM

## 2018-04-09 DIAGNOSIS — D64.9 ANEMIA: ICD-10-CM

## 2018-04-09 DIAGNOSIS — I63.9 CEREBROVASCULAR ACCIDENT (CVA) OF LEFT PONTINE STRUCTURE (HCC): Primary | ICD-10-CM

## 2018-04-09 DIAGNOSIS — N18.4 CKD (CHRONIC KIDNEY DISEASE) STAGE 4, GFR 15-29 ML/MIN (HCC): Primary | ICD-10-CM

## 2018-04-09 DIAGNOSIS — E87.5 HYPERKALEMIA: ICD-10-CM

## 2018-04-09 PROCEDURE — 97112 NEUROMUSCULAR REEDUCATION: CPT

## 2018-04-09 PROCEDURE — G8979 MOBILITY GOAL STATUS: HCPCS

## 2018-04-09 PROCEDURE — G8978 MOBILITY CURRENT STATUS: HCPCS

## 2018-04-09 PROCEDURE — 97164 PT RE-EVAL EST PLAN CARE: CPT

## 2018-04-09 PROCEDURE — 97530 THERAPEUTIC ACTIVITIES: CPT

## 2018-04-09 PROCEDURE — G8991 OTHER PT/OT GOAL STATUS: HCPCS

## 2018-04-09 PROCEDURE — G8990 OTHER PT/OT CURRENT STATUS: HCPCS

## 2018-04-09 NOTE — PROGRESS NOTES
Occupational Therapy Stroke Progress Note/Status Update: Today's Date: 2018  Patient Name: Best Russell  : 1985  MRN: [id number]  Referring Provider: Teena Mcnally DO  Dx: Cerebrovascular accident (CVA) of left pontine structure ([facility] ) [I63 [id number]]    Active Problem List:   Patient Active Problem List   Diagnosis    Cerebrovascular accident (CVA) of left pontine structure ([facility] )    Hypertensive urgency    Type 1 diabetes mellitus with diabetic nephropathy, with long-term current use of insulin 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,      Binocular visual disturbance    Vitamin D deficiency    Hyperhomocysteinemia ([facility] )    Hyperkalemia    Anemia in stage 3 chronic kidney disease    Chronic kidney disease, stage 4 (severe) (HCC)    Persistent proteinuria    Bilateral leg edema    Ataxia    Esophagitis    Gastritis    Left atrial dilation    Renovascular hypertension     Past Medical Hx:   Past Medical History:   Diagnosis Date    Acute kidney injury ([facility] )     Cerebellar stroke side undetermined 2015    Diabetes type 1, controlled ([facility] )     Hypertension      Past Surgical Hx:   Past Surgical History:   Procedure Laterality Date    TONSILLECTOMY        Pain Levels:   Restin    With Activity:  0    Subjective/Patient Goal: "I felt lousy last week I was super dizzy and didn't feel well"    History of Present Illness:  Pt is a 27 y o  male who was referred to Occupational Therapy s/p CVA  Pt has had two hospitalizations within the last month  Candi May was hospitalized with acute CVA (Also had a previous lacunar infarct), LLUVIA on CKD, hypertensive urgency, 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   Due to concerns for for demyelinating disorder patient underwent a course of plasmapheresis urgency -discharged on 2018  He was readmitted on 2/10/18 with hypertensive urgency   Pt has a h/o diabetes mellitus type 1, CKD, CVA, hyperhomocystinemia who was  Previously managed by Dr Christine Alvarez but lost to follow-up  The first CVA, which was July 2015, 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  Dimitry Penny was found to be 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 and also heterozygote for prothrombin gene mutation  He was scheduled for subsequent follow-up with hematology oncology but did not follow-up  He also continued smoking cigarettes   In review of previous labs it appears that creatinine baseline was between 2-2 4 as far back as 2015 up until the summer of 2017   During hospitalization from 1/22/18-2/5/18 creatinine peaked at 8 3 and was down to 3 39 at discharge   When patient was readmitted on 2/10/18 creatinine was 4 03 and was down to 3 66 at discharge   Follow-up labs show a creatinine of 4 mg/dL  Dimitry Penny underwent significant adjustment of antihypertensives during second hospital visit  Kacy Burns was discharged on labetalol, chlorthalidone, hydralazine, nifedipine and torsemide, ultimately dx'd w/ subacute posterior velia CVA, MRIB confirms same, comorbidities as listed above  Lifestyle Performance Model:  Autonomy: Pt was I w/ I/ADLS, drove, & required no use of DME PTA though has not been driving since recent CVA 1/2018, parents assist w/ driving as needed  Reciprocal Relationships: Supportive parents pt moved in w/ s/p recent CVA, pt was living alone prior to CVA, single  Service to Others: Pt is unemployed currently, was working as a  for [employer]  Intrinsic Gratification: Enjoys woodworking and listening to classic rock music  Home Setup: Pt lives w/ parents in a [facility] w/ 2nd floor bedroom/bathroom w/ first floor 1/2 bathroom w/ "few" NIRU      Objective  Impairments Section:   UE Strength:   ANGEL: RUE: 105/200 LUE: 100/200   PINCER: 3 point pinch: RUE 19, LUE:20   2 point pinch: RUE: 16, LUE:18   Lateral pincher: RUE: 25, LUE: 23    Coordination:   9 HOLE PEG TEST:     RUE: 39 32 seconds, LUE: 30 96 Seconds    Range of Motion:  AROM:   Full t/o B/L UE WFL/WNL    PROM:   Full t/o B/L UE WFL/WNL    Sensation:  MYOFILAMENTS:   RUE: WNL 3 61   LUE: WNL 3 61    Visual Perceptual and Functional Cognition:  1  Convergence Insufficiency Symptom Survey (CISS): 22/60 FAIL    2  Concussion Cognitive Checklist: self report symptom checklist  *Patient indicated that he is experiencing the following symptoms:    · Memory: Remembering people's names    · Attention: None reported    · Processing: None reported    · Executive Functions: Monitoring your own ideas, behaviors, and/or emotions    · Communication: None reported    · Visual: Misspelling while texting/email and Change in handwriting    · Emotional: None reported    · Increased Sensitivities to: Noise     3  Contextual Memory Test (CMT): Deferred 2* scored WNL/WFL on MoCA on I E  On 2/27/2018  ;:  4  [place] Cognitive Assessment Version: (Deferred 2* scored 28/30 on I E  On 2/27/2018 indicative of WNL/WFL)    5  Vision Screening Recording Form:   vision screen: + glasses for distance only, initially for near acuity, pt reports since CVA glasses have been ineffective and worse for near acuity  near acuity: R 20/30, L 20/25   binocularity far: orthophoria  Binocularity near: low exophoria  red green fusion: PLEG @ 3"   near point of convergence: diplopia @ 12" w/ poor tolerance of oncoming objects, decreased teaming B/L w/ poor fusion   perlita string: suppression of near vision w/ convergence insufficiency  Pursuits: jerky in all planes w/ dysmetria   saccades: inaccurate in all planes, unable to cross from midline to far L VF, L eye 2* conjugate gaze w/ ocular palsy,    ocular ROM: limited 2* L eye conjugate gaze w/ ocular palsy, unable to cross midline to far L VF  Visual Perceptual Midline shift: At midline and below horizon    6   Anxiety/Depression:  Pt engaged in the Neuro QOL Anxiety Short Form and Neuro QOL Depression Short Form in order to screen for anxiety and depressive s/s  Pt reported the following:    Anxiety- Short Form:   --used to measure anxious s/s  For scoring purposes, scores of 25+ indicate the threshold for treatment per neurology/SLIM  Score:15/40  Pt most often chose the response never when asked about feeling anxious s/s      Depression- Short Form:   --used to measure depressive s/s  For scoring purposes, scores of 25+ indicate the threshold for treatment per neurology/SLIM  Score:15/40  Pt most often chose the response rarely when asked about feeling depressive s/s  Assessment/Plan  Occupational Therapy Skilled Analysis Assessment and Plan of Care:  Pt requires overall mod I for ADLs/self care and mod I for fx'l mobility w/o DME  Pt is currently demonstrating the following occupational deficits: limited 2* diplopia @ 12", PLRG @ 3", resting L esophoria, decreased teaming L eye, L eye does not cross midline from midline to far L VF, jerky pursuits in all planes, dysmetric saccades, L conjugate gaze ocular palsy, binocular vision impairments, shifted below horizon w/ L head tilt persistent  The following Occupational Performance Areas to address include: medication management, socialization, health maintenance, cleaning, meal prep, money management, household maintenance, care of children, care of pets, job performance/volunteering and social participation  Based on the aforementioned OT evaluation, functional performance deficits, and assessments, pt has been identified as a high complexity evaluation  Pt to continue to benefit from outpatient skilled OT services to address the following goals 2x/wk to  w/in 4 more weeks with special focus on self-care management, pt education,  and VM training as well as motor training to improve above defiicits and enhance overall QOL/function      Pt reports contacted neuro opthamologist Dr Keara Babin though did not accept his insurance, scheduled w/ neuro opthamologist Francisca Doan in [place] next month  Continue to recommend neuro optometrist for vision therapy  Goals:  Short Term Goals:  - Pt will increase oculomotor control for improved saccades, con/divergent tasks for improved reading, board to table tasks with minimal increase in symptoms 4 weeks-PARTIALLY MET  - Pt will increased L eye pursuits and ROM to L visual field x 50% for improved binocularity x 4 weeks  -PARTIALLY MET     Long Term Goals:  -Pt will increase oculomotor control for improved dynamic activities with head turns, board/screen to table tasks symptom free, improved VMI and return to baseline handwriting-PARTIALLY MET  - Pt will increase oculomotor control for WNL saccades, con/divergent tasks symptom free for improved QOL-PARTIALLY MET  -Pt will increased L eye pursuits and ROM to L visual field x 75% for improved binocularity-PARTIALLY MET    INTERVENTION COMMENTS:  Diagnosis: Posterior Daisy CVA  Precautions: LLUVIA, HTN, DM I  Foto: 81 with 19% limitation  Insurance: Payor: Jaime Tabares / Plan: Jaime Tabares / Product Type: Medicaid HMO /   8 of 24 visits, PN due 5/9/2018    Thank you for the consult!   Please call if you have any questions: [phone]  Colin Grams, OTCRISTIAN, OTR/L, C-GCALLI, CSRS  Director of Outpatient Neuro Occupational Therapy

## 2018-04-09 NOTE — PROGRESS NOTES
Re-evaluation    Today's date: 2018  Patient name: Carlos Perez  : 1985  MRN: [id number]  Referring provider: Lakesha Callejas DO  Dx:   Encounter Diagnosis   Name Primary?  Cerebrovascular accident (CVA) of left pontine structure ([address] ) Yes       Start Time: 1500  Stop Time: 1600  Total time in clinic (min): 60 minutes    Assessment  Impairments: abnormal coordination, abnormal gait, activity intolerance, impaired balance, impaired physical strength and lacks appropriate home exercise program  Other impairment: Dizziness    Patient is not irritable  Assessment details: Re-evaluation completed this session  Compared to initial evaluation patient demonstrates significant improvements in LE strength per MMT, improved balance per the Callejas and the mCTSIB, and improved gait speed and endurance per the 6 MWT  Patient presents with increased dizziness this session, with positive [place]-Hallpike testing indicating left posterior canalithiasis BPPV  Performed L CRM x 3 this session with mild reduction in symptoms during each repetition  Patient has made progress toward his goals but deferred the 6 MWT this session due to instability from recent exacerbation of dizziness  Patient may benefit from continued skilled physical therapy to address the above deficits and maximize his functional mobility while minimizing fall risk     Barriers to therapy: Dizziness  Understanding of Dx/Px/POC: good   Prognosis: good    Goals  STG 1: Patient will complete the Callejas Balance Assessment with a score of 52/56 in 4 weeks - partially met  STG 2: Patient will ambulate 1150 feet during the 6 MWT with no AD and good stability in 4 weeks - partially met  STG 3: Patient will complete the 5x STS in <15 seconds with no UE use in 4 weeks - met  STG 4: Patient will maintain his balance with feet together and eyes closed on the foam for 30 seconds in 4 weeks - new    LTG 1: Patient will deny falls or near falls in 8 weeks - met  LTG 2: Patient will be independent with HEP in 8 weeks - partially met  LTG 3: Patient will be an independent community ambulator with a gait speed of >0 45 ft/sec to safely negotiate streets in 8 weeks - partially met      Plan  Patient would benefit from: skilled PT and OT eval  Referral necessary: Yes  Planned therapy interventions: neuromuscular re-education, patient education, balance, therapeutic exercise, therapeutic activities, strengthening and home exercise program  Frequency: 2x week  Duration in weeks: 12  Treatment plan discussed with: patient        Subjective Evaluation    History of Present Illness  Date of onset: 1/22/2018  Mechanism of injury: Patient reports that he went to his family's cabin 1 week ago and was throwing up the entire time and had a sore throat but since then has been very dizzy  Patient reports that he feels like his head is spinning  Patient reports that he was having some dizziness before the trip due to impaired vision but now he feels like it is very severe even when he is sitting or lying with his eyes closed  Patient reports that otherwise, being at the cabin with good PT and he was able to negotiate the uneven ground with a little instability but no falls     Not a recurrent problem   Quality of life: fair    Pain  No pain reported    Social Support  Steps to enter house: yes  2  Stairs in house: yes (landing in the milddle of the flight)   20  Lives in: multiple-level home  Lives with: parents    Employment status: not working (Previously looking for a job and working for [employer])  Hand dominance: right      Diagnostic Tests  MRI studies: abnormal  CT scan: abnormal  Treatments  Current treatment: physical therapy and occupational therapy        Objective  PT/OT Neuro Exam  Neurologic Exam     Dizziness  Current: 2/10  At worst: 8/10   At best: 1/10  Exacerbating factors: rolling over in bed, position changes    Balance Test    6 Minute Walk Test (ft): 1100 feet with no AD - not assessed this session due to dizziness   Gait Speed (ft/s): 4 16 - not assessed this session due to dizziness   5x Sit To Stand (s): 13 9 sec from purple chair         MCTSIB Number of Seconds   Feet Together, Eyes Open 30   Feet Together, Eyes Closed 30   Feet Together, Eyes Open Foam 30   Feet Together, Eyes Closed Foam 8     Flowsheet Rows    Flowsheet Row Most Recent Value   Callejas Balance Scale   1  Sitting to Standing  4   2  Standing Unsupported  4   3  Sitting with Back Unsupported but Feet Supported on Floor or on a Stool  4   4  Standing to Sitting  4   5  Transfers  4   6  Standing Unsupported with Eyes Closed  4   7  Standing Unsupported with Feet Together  4   8  Reach Forward with Outstretched Arm While Standing  4   9   Object from Floor from a Standing Position  4   10  Turning to Look Behind Over Left and Right Shoulders While Standing  4   11  Turn 360 Degrees  3   12  Place Alternate Foot on Step or Stool While Standing Unsupported  2   13  Standing Unsupported One Foot in [facility]  4   14   Standing on One Leg  2   Callejas Balance Score  51      Callejas: Indicates no increased risk for falls    Manual Muscle Testing - Hip Left Right   Flexion 5 5   Extension 4 4   Abduction 4 4   External Rotation 4 4     Manual Muscle Testing - Knee Left Right   Flexion 5 5   Extension 5 5     Manual Muscle Testing - Ankle Left Right   Doriflexion 4 4   Plantarflexion 3 3        Transfers    Sit To Stand Independent   W/C To Bed Independent   Sit To Supine Independent   Roll Independent       Gait Assessment:   Decreased trunk rotation, moderate path deviation, wide COLBY, decreased reciprocal arm swing    Mild left facial drooping noted, denies difficulty with swallowing or drinking    VOMS (Vestibular/Ocular Motor Screen)-- blurry vision     Resting position: left lateral      Smooth pursuit Abnormal     Saccades (horizontal) Normal, slowed in all directions     Saccades (vertical)  Normal, slowed in all directions     Convergence: Insufficiency - decreased teaming bilaterally    Cervical ROM: WFL  Alar ligament: Neg  Sharp Lazaro: Neg   Axial compression: Neg  Spurling's: Neg  Modified VBI screen: Mild increase in dizziness with bending over with rotation      Positional testing: Right Left   Oscar jefferson Mild symptoms +   Roll test: - -         Precautions: CVA with visual disturbances, CKD, DM type 1, HTN, falls     Daily Treatment Diary         Exercise Diary  3/23  3/28 4/9  3/14/18  3/16   Bike 10 min, L3  10 min   l2 x10 min  10 min   Treadmill           FTEO    airex 30"x3        FAEC   airex 30"x3        Tandem gait 3 laps   3 laps  Mini //   3 laps  in gonzalez  40'x2, arms out  3 laps   Semi-tandem stance      semi  [id number] EC     HK marches 2# ankle weights 1 lap gonzalez  10ft // bars 4 laps no UE Arms over chest 15x on foam  in gonzalez  40'x2     STS feet staggered 15x L foot back  15x R foot back from orange chair  foam- 30x     3x10   Step taps from foam   20x ea cones   8"  2x30     Rockerboard taps 20xea EC A/P/ lat 15x -   M/L, A/P  1 min ea  40x ea   Heel/toe walking  Gonzalez 1 lap ea  2 laps ea    heel walking/toe walking 3 laps ea    cone taps   20x side stepping   From foam  20xea     Step downs 6"     6" R/L  2x15     fwd/bwd walking with HTs, H/V  1 lap ea  1 lap ea   2x40' each      lunges 10x2 ea  15xea Deferred due to dizziness    3 laps    step ups  6" foam on floor and step  10xea  8" foam 15x ea     foam 2x15 ea

## 2018-04-09 NOTE — TELEPHONE ENCOUNTER
I spoke with Linwood Daniel mother, Gricelda Coates regarding repeat labs  Potassium level is 4 8 which we are very happy about  Creatinine has bumped up again slightly  Apparently Elisabet Casanova went to his grandfather's cabin over the weekend and blood sugars were intermittently very elevated which may have caused some polyuria and mild dehydration  He was also complaining of dizziness intermittently  Repeat labs will be obtained this week and before next appointment in the beginning of May with Dr Magi Vasquez   I also instructed Gricelda Coates to obtain blood pressures sitting and standing  His a m  blood pressure readings are in the 160s (before medications) which is quite good for Elisabet Casanova considering how difficult is to control his blood pressure  She will call with results of blood pressure readings

## 2018-04-10 ENCOUNTER — APPOINTMENT (OUTPATIENT)
Dept: LAB | Facility: CLINIC | Age: 33
End: 2018-04-10
Payer: COMMERCIAL

## 2018-04-10 DIAGNOSIS — E87.5 HYPERKALEMIA: ICD-10-CM

## 2018-04-10 DIAGNOSIS — N18.4 CKD (CHRONIC KIDNEY DISEASE) STAGE 4, GFR 15-29 ML/MIN (HCC): ICD-10-CM

## 2018-04-10 LAB
ANION GAP SERPL CALCULATED.3IONS-SCNC: 9 MMOL/L (ref 4–13)
BUN SERPL-MCNC: 45 MG/DL (ref 5–25)
CALCIUM SERPL-MCNC: 8.5 MG/DL
CHLORIDE SERPL-SCNC: 103 MMOL/L (ref 100–108)
CO2 SERPL-SCNC: 23 MMOL/L (ref 21–32)
CREAT SERPL-MCNC: 4.36 MG/DL (ref 0.6–1.3)
GFR SERPL CREATININE-BSD FRML MDRD: 16 ML/MIN/1.73SQ M
GLUCOSE SERPL-MCNC: 277 MG/DL (ref 65–140)
POTASSIUM SERPL-SCNC: 4.8 MMOL/L (ref 3.5–5.3)
SODIUM SERPL-SCNC: 135 MMOL/L (ref 136–145)

## 2018-04-10 PROCEDURE — 36415 COLL VENOUS BLD VENIPUNCTURE: CPT

## 2018-04-10 PROCEDURE — 80048 BASIC METABOLIC PNL TOTAL CA: CPT

## 2018-04-11 ENCOUNTER — APPOINTMENT (OUTPATIENT)
Dept: OCCUPATIONAL THERAPY | Facility: CLINIC | Age: 33
End: 2018-04-11
Payer: COMMERCIAL

## 2018-04-11 ENCOUNTER — APPOINTMENT (OUTPATIENT)
Dept: PHYSICAL THERAPY | Facility: CLINIC | Age: 33
End: 2018-04-11
Payer: COMMERCIAL

## 2018-04-11 ENCOUNTER — TELEPHONE (OUTPATIENT)
Dept: NEPHROLOGY | Facility: CLINIC | Age: 33
End: 2018-04-11

## 2018-04-11 DIAGNOSIS — I10 ACCELERATED HYPERTENSION: ICD-10-CM

## 2018-04-12 RX ORDER — LABETALOL 300 MG/1
150 TABLET, FILM COATED ORAL EVERY 8 HOURS SCHEDULED
Qty: 60 TABLET | Refills: 3 | Status: SHIPPED | OUTPATIENT
Start: 2018-04-12 | End: 2020-03-03 | Stop reason: HOSPADM

## 2018-04-12 RX ORDER — NIFEDIPINE 60 MG/1
60 TABLET, FILM COATED, EXTENDED RELEASE ORAL EVERY EVENING
Qty: 180 TABLET | Refills: 3 | Status: SHIPPED | OUTPATIENT
Start: 2018-04-12 | End: 2018-06-14 | Stop reason: SDUPTHER

## 2018-04-12 NOTE — TELEPHONE ENCOUNTER
CHART update:  I had weekly conversations with Carolann Sharp or his mother Ewa Linares  Blood pressure is responding well to current treatment in fact medications have been tapered  He is currently taking labetalol 150 mg t i d , hydralazine 100 mg t i d , nifedipine 60 mg b i d , chlorthalidone 50 mg daily  When I last spoke with his mother Ewa Linares regarding blood work results she had mentioned that Carolann Sharp did not feel too well the preceding weekend and was getting dizzy at times therefore I instructed her to do blood pressures sitting and standing  Yesterday morning blood pressure upon arising was in the 398U systolic and he took his medications as prescribed  At approximately 0900 hr blood pressure readings were recheck and blood pressure was 756 systolic with a decline to 109 upon standing and Carolann Sharp reported dizziness  In the afternoon blood pressure readings were similar therefore his mother did not given the afternoon dose of hydralazine or labetalol  He felt somewhat better  This morning blood pressure readings were in the 170-180 upon arising  Follow-up readings showed a decline upon standing from 298 to 820 systolic, similar readings were obtained in the afternoon  At this juncture I am holding a m  dose of nifedipine and continuing p m  dose  Continue hydralazine and labetalol as ordered along with chlorthalidone  I will be calling Ewa Linares tomorrow afternoon between 2-3 p m  at that time we will review blood pressure readings and make further medication adjustments

## 2018-04-15 ENCOUNTER — TELEPHONE (OUTPATIENT)
Dept: INPATIENT UNIT | Facility: HOSPITAL | Age: 33
End: 2018-04-15

## 2018-04-15 NOTE — PROGRESS NOTES
Occupational Therapy Daily Note:    Today's date: 4/15/2018  Patient name: Dominic Lewis  : 1985  MRN: [id number]  Referring provider: Kelly Vyas DO  Dx: No diagnosis found  Subjective: ***    Objective: See treatment diary below    Assessment: Pt seen for OT treatment session focusing on     Pt is currently demonstrating the following occupational deficits: limited 2* diplopia @ 12", PLRG @ 3", resting L esophoria, decreased teaming L eye, L eye does not cross midline from midline to far L VF, jerky pursuits in all planes, dysmetric saccades, L conjugate gaze ocular palsy, binocular vision impairments, shifted below horizon w/ L head tilt persistent  Tolerated treatment well  Patient would benefit from continued skilled OT       Plan: Continued skilled OT per POC with focus on /VM re-training, ocular motor re-training,      INTERVENTION COMMENTS:  Diagnosis: Posterior Daisy CVA  Precautions: Acute Kidney injury, HTN, DM  Insurance: Payor: Vincent Charter / Plan: Vincent Charter / Product Type: Medicaid HMO /   0 CC 86 MJDBGT, PN due 2018    Thank you for the consult!   Please call if you have any questions: [phone]  Avtar Temple, SVEN, OTR/L, C-GCM, CSRS  Director of Outpatient Neuro Occupational Therapy

## 2018-04-16 ENCOUNTER — HOSPITAL ENCOUNTER (OUTPATIENT)
Dept: NON INVASIVE DIAGNOSTICS | Facility: HOSPITAL | Age: 33
Discharge: HOME | End: 2018-04-16
Attending: INTERNAL MEDICINE
Payer: COMMERCIAL

## 2018-04-16 ENCOUNTER — APPOINTMENT (OUTPATIENT)
Dept: OCCUPATIONAL THERAPY | Facility: CLINIC | Age: 33
End: 2018-04-16
Payer: COMMERCIAL

## 2018-04-16 ENCOUNTER — APPOINTMENT (OUTPATIENT)
Dept: PHYSICAL THERAPY | Facility: CLINIC | Age: 33
End: 2018-04-16
Payer: COMMERCIAL

## 2018-04-16 ENCOUNTER — ANESTHESIA (OUTPATIENT)
Dept: NON INVASIVE DIAGNOSTICS | Facility: HOSPITAL | Age: 33
End: 2018-04-16
Payer: COMMERCIAL

## 2018-04-16 ENCOUNTER — HOSPITAL ENCOUNTER (OUTPATIENT)
Dept: NON INVASIVE DIAGNOSTICS | Facility: HOSPITAL | Age: 33
Discharge: HOME | End: 2018-04-16
Attending: INTERNAL MEDICINE | Admitting: INTERNAL MEDICINE
Payer: COMMERCIAL

## 2018-04-16 ENCOUNTER — TELEPHONE (OUTPATIENT)
Dept: OTHER | Facility: HOSPITAL | Age: 33
End: 2018-04-16

## 2018-04-16 ENCOUNTER — ANESTHESIA EVENT (OUTPATIENT)
Dept: NON INVASIVE DIAGNOSTICS | Facility: HOSPITAL | Age: 33
End: 2018-04-16
Payer: COMMERCIAL

## 2018-04-16 VITALS
DIASTOLIC BLOOD PRESSURE: 82 MMHG | OXYGEN SATURATION: 98 % | RESPIRATION RATE: 17 BRPM | SYSTOLIC BLOOD PRESSURE: 178 MMHG | HEART RATE: 72 BPM

## 2018-04-16 VITALS
HEIGHT: 71 IN | DIASTOLIC BLOOD PRESSURE: 98 MMHG | TEMPERATURE: 99 F | BODY MASS INDEX: 29.4 KG/M2 | RESPIRATION RATE: 18 BRPM | WEIGHT: 210 LBS | SYSTOLIC BLOOD PRESSURE: 154 MMHG | HEART RATE: 76 BPM | OXYGEN SATURATION: 98 %

## 2018-04-16 DIAGNOSIS — I63.9 CEREBROVASCULAR ACCIDENT (CVA) OF LEFT PONTINE STRUCTURE (HCC): Chronic | ICD-10-CM

## 2018-04-16 PROCEDURE — 93325 DOPPLER ECHO COLOR FLOW MAPG: CPT | Performed by: INTERNAL MEDICINE

## 2018-04-16 PROCEDURE — 33282 HB IMPLANT PAT-ACTIVE HT RECORD: CPT | Performed by: INTERNAL MEDICINE

## 2018-04-16 PROCEDURE — 93320 DOPPLER ECHO COMPLETE: CPT | Performed by: INTERNAL MEDICINE

## 2018-04-16 PROCEDURE — 93312 ECHO TRANSESOPHAGEAL: CPT | Performed by: INTERNAL MEDICINE

## 2018-04-16 PROCEDURE — 33282 PR IMPLANTATION PT-ACTIVATED CARDIAC EVENT RECORDER: CPT | Performed by: INTERNAL MEDICINE

## 2018-04-16 PROCEDURE — C1764 EVENT RECORDER, CARDIAC: HCPCS

## 2018-04-16 RX ORDER — SODIUM CHLORIDE 9 MG/ML
INJECTION, SOLUTION INTRAVENOUS CONTINUOUS PRN
Status: DISCONTINUED | OUTPATIENT
Start: 2018-04-16 | End: 2018-04-16 | Stop reason: SURG

## 2018-04-16 RX ORDER — LIDOCAINE HYDROCHLORIDE AND EPINEPHRINE 10; 10 MG/ML; UG/ML
INJECTION, SOLUTION INFILTRATION; PERINEURAL CODE/TRAUMA/SEDATION MEDICATION
Status: COMPLETED | OUTPATIENT
Start: 2018-04-16 | End: 2018-04-16

## 2018-04-16 RX ORDER — LIDOCAINE HYDROCHLORIDE 10 MG/ML
INJECTION, SOLUTION INFILTRATION; PERINEURAL AS NEEDED
Status: DISCONTINUED | OUTPATIENT
Start: 2018-04-16 | End: 2018-04-16 | Stop reason: SURG

## 2018-04-16 RX ORDER — CLONIDINE HYDROCHLORIDE 0.1 MG/1
0.1 TABLET ORAL ONCE
Status: COMPLETED | OUTPATIENT
Start: 2018-04-16 | End: 2018-04-16

## 2018-04-16 RX ORDER — PROPOFOL 10 MG/ML
INJECTION, EMULSION INTRAVENOUS CONTINUOUS PRN
Status: DISCONTINUED | OUTPATIENT
Start: 2018-04-16 | End: 2018-04-16 | Stop reason: SURG

## 2018-04-16 RX ORDER — PROPOFOL 10 MG/ML
INJECTION, EMULSION INTRAVENOUS AS NEEDED
Status: DISCONTINUED | OUTPATIENT
Start: 2018-04-16 | End: 2018-04-16 | Stop reason: SURG

## 2018-04-16 RX ADMIN — LIDOCAINE HYDROCHLORIDE 100 MG: 10 INJECTION, SOLUTION INFILTRATION; PERINEURAL at 11:26

## 2018-04-16 RX ADMIN — PROPOFOL 100 MG: 10 INJECTION, EMULSION INTRAVENOUS at 11:26

## 2018-04-16 RX ADMIN — SODIUM CHLORIDE: 0.9 INJECTION, SOLUTION INTRAVENOUS at 10:50

## 2018-04-16 RX ADMIN — CLONIDINE HYDROCHLORIDE 0.1 MG: 0.1 TABLET ORAL at 12:45

## 2018-04-16 RX ADMIN — PROPOFOL 140 MCG/KG/MIN: 10 INJECTION, EMULSION INTRAVENOUS at 11:26

## 2018-04-16 RX ADMIN — LIDOCAINE HYDROCHLORIDE AND EPINEPHRINE 20 ML: 10; 10 INJECTION, SOLUTION INFILTRATION; PERINEURAL at 10:10

## 2018-04-16 NOTE — NURSING NOTE
S/W DEISI Lua who pt sees for renal issues and blood pressure  She recommended Clonidine 0 1 mg for elevated blood pressure post SANJEEV  Order obtained from anesthesia

## 2018-04-16 NOTE — ANESTHESIA POSTPROCEDURE EVALUATION
Post-Op Assessment Note      CV Status:  Stable    Mental Status:  Alert and awake    Hydration Status:  Euvolemic    PONV Controlled:  Controlled    Airway Patency:  Patent    Post Op Vitals Reviewed: Yes          Staff: CRNA           BP  192/89   Temp      Pulse  71   Resp   16   SpO2   99%

## 2018-04-16 NOTE — DISCHARGE INSTRUCTIONS
Transesophageal Echocardiogram   WHAT YOU NEED TO KNOW:   A transesophageal echocardiogram (SANJEEV) is a procedure used to check for problems with your heart  It will also show any problems in the blood vessels near your heart  Sound waves are sent to the heart through a tube inserted into your throat  The sound waves show the structure and function of your heart through pictures on a monitor  DISCHARGE INSTRUCTIONS:   Rest:  Rest until you are fully awake  You may be sleepy for a while after your procedure  Do not eat or drink until you are able to swallow normally:  Start with soft foods, such as oatmeal, yogurt, or applesauce  Once you can eat soft foods easily, you may begin to eat solid foods  Follow up with your healthcare provider as directed:  Write down your questions so you remember to ask them during your visits  Contact your healthcare provider if:   · You have a fever or chills  · You taste blood in your mouth  · You have a severe sore throat or difficulty swallowing  · You have questions or concerns about your condition or care  Seek care immediately or call 911 if:   · You have any of the following signs of a heart attack:      ¨ Squeezing, pressure, or pain in your chest that lasts longer than 5 minutes or returns    ¨ Discomfort or pain in your back, neck, jaw, stomach, or arm     ¨ Trouble breathing    ¨ Nausea or vomiting    ¨ Lightheadedness or a sudden cold sweat, especially with chest pain or trouble breathing    © 2017 [address] [place] [address] Street is for End User's use only and may not be sold, redistributed or otherwise used for commercial purposes  All illustrations and images included in CareNotes® are the copyrighted property of A D A M , Inc  or Rickie Carcamo  The above information is an  only  It is not intended as medical advice for individual conditions or treatments   Talk to your doctor, nurse or pharmacist before following any medical regimen to see if it is safe and effective for you  Cardiac Loop Recorder Insertion   WHAT YOU NEED TO KNOW:   A cardiac loop recorder is a device used to diagnose heart rhythm problems, such as a fast or irregular heartbeat  It is implanted in your left chest or armpit, just under the skin  The device records a pattern of your heart's rhythm, called an EKG  Your device records automatic EKGs, depending on how your healthcare provider programs it  You may also receive a handheld controller  You press a button on the controller when you have symptoms, such as dizziness or lightheadedness  The device will record an EKG at that moment  The recording can help your healthcare provider see if your symptoms may be caused by heart rhythm problems  Your healthcare provider will remove the device after it has collected enough data  You may need the device for up to 3 years  The procedure to remove the device is similar to the procedure used to implant it  DISCHARGE INSTRUCTIONS:   Follow up with your cardiologist as directed: You will need to return in 1 to 2 weeks  Your cardiologist will check your incision  He may also program your device settings again  He will retrieve data from the device every 1 to 3 months with a monitor held over your skin  You may be able to transmit data from your device over the phone  You will do this by calling a number provided by your cardiologist  Ask for information about this process  Write down your questions so you remember to ask them during your visits  Wound care:  Carefully wash your incision with soap and water  Keep the area clean and dry until it heals  Return to activity:  Most people can return to normal activities soon after the procedure  Your cardiologist may want to know if your work involves electrical current or high-voltage equipment  Ask about other electrical items that could interfere with your cardiac loop recorder     Contact your cardiologist if:   · You have a fever or chills  · Your wound is red, swollen, or draining pus  · You have questions or concerns about your condition or care  Seek care immediately or call 911 if:   · You feel weak, dizzy, or faint  · You lose consciousness  © 2017 [facility] Information is for End User's use only and may not be sold, redistributed or otherwise used for commercial purposes  All illustrations and images included in CareNotes® are the copyrighted property of A D A M , Inc  or Rickie Carcamo  The above information is an  only  It is not intended as medical advice for individual conditions or treatments  Talk to your doctor, nurse or pharmacist before following any medical regimen to see if it is safe and effective for you  Moderate Sedation   WHAT YOU NEED TO KNOW:   Moderate sedation, or conscious sedation, is medicine used during procedures to help you feel relaxed and calm  You will be awake and able to follow directions without anxiety or pain  You will remember little to none of the procedure  You may feel tired, weak, or unsteady on your feet after you get sedation  You may also have trouble concentrating or short-term memory loss  These symptoms should go away in 24 hours or less  DISCHARGE INSTRUCTIONS:   Call 911 or have someone else call for any of the following:   · You have sudden trouble breathing  · You cannot be woken  Seek care immediately if:   · You have a severe headache or dizziness  · Your heart is beating faster than usual   Contact your healthcare provider if:   · You have a fever  · You have nausea or are vomiting for more than 8 hours after the procedure  · Your skin is itchy, swollen, or you have a rash  · You have questions or concerns about your condition or care  Self-care:   · Have someone stay with you for 24 hours  This person can drive you to errands and help you do things around the house   This person can also watch for problems  · Rest and do quiet activities for 24 hours  Do not exercise, ride a bike, or play sports  Stand up slowly to prevent dizziness and falls  Take short walks around the house with another person  Slowly return to your usual activities the next day  · Do not drive or use dangerous machines or tools for 24 hours  You may injure yourself or others  Examples include a lawnmower, saw, or drill  Do not return to work for 24 hours if you use dangerous machines or tools for work  · Do not make important decisions for 24 hours  For example, do not sign important papers or invest money  · Drink liquids as directed  Liquids help flush the sedation medicine out of your body  Ask how much liquid to drink each day and which liquids are best for you  · Eat small, frequent meals to prevent nausea and vomiting  Start with clear liquids such as juice or broth  If you do not vomit after clear liquids, you can eat your usual foods  · Do not drink alcohol or take medicines that make you drowsy  This includes medicines that help you sleep and anxiety medicines  Ask your healthcare provider if it is safe for you to take pain medicine  Follow up with your healthcare provider as directed:  Write down your questions so you remember to ask them during your visits  © 2017 [address]  Information is for End User's use only and may not be sold, redistributed or otherwise used for commercial purposes  All illustrations and images included in CareNotes® are the copyrighted property of [employer]  or Rickie Carcamo  The above information is an  only  It is not intended as medical advice for individual conditions or treatments  Talk to your doctor, nurse or pharmacist before following any medical regimen to see if it is safe and effective for you

## 2018-04-16 NOTE — ANESTHESIA PREPROCEDURE EVALUATION
Review of Systems/Medical History  Patient summary reviewed    No history of anesthetic complications     Cardiovascular  Hypertension poorly controlled,    Pulmonary  Negative pulmonary ROS        GI/Hepatic  Negative GI/hepatic ROS          Kidney disease ,        Endo/Other  Negative endo/other ROS Diabetes well controlled ,      GYN  Negative gynecology ROS          Hematology  Anemia ,     Musculoskeletal  Negative musculoskeletal ROS        Neurology    CVA , residual symptoms,   Comment: Slight droop to left side of face Psychology   Negative psychology ROS              Physical Exam    Airway    Mallampati score: III  TM Distance: >3 FB  Neck ROM: full     Dental       Cardiovascular  Cardiovascular exam normal    Pulmonary  Pulmonary exam normal     Other Findings        Anesthesia Plan  ASA Score- 3     Anesthesia Type- IV sedation with anesthesia with ASA Monitors  Additional Monitors:   Airway Plan:         Plan Factors-    Induction- intravenous  Postoperative Plan-     Informed Consent- Anesthetic plan and risks discussed with patient  Transthoracic Echocardiogram  2D, M-mode, Doppler, and Color Doppler     Study date:  2018     Patient: Venancio Palmer  MR number: [id number]  Account number: [de-identified]  : 1985  Age: 27 years  Gender: Male  Status: Inpatient  Location: Bedside  Height: 70 in  Weight: 200 6 lb  BP: 175/ 84 mmHg     Indications: TIA; hypertensive renal disease     Diagnoses: G45 9 - Transient cerebral ischemic attack, unspecified     Sonographer:  BRITTA Carcamo  Primary Physician:  Arjun Helms DO  Referring Physician:  Juan Luis Hung MD  Group:  [id number]  Interpreting Physician:  Beatriz Nagel DO     SUMMARY     LEFT VENTRICLE:  Systolic function was normal  Ejection fraction was estimated in the range of 60 % to 65 %  There were no regional wall motion abnormalities  Wall thickness was increased    There was moderate concentric hypertrophy  Doppler parameters were consistent with abnormal left ventricular relaxation (grade 1 diastolic dysfunction)  LEFT ATRIUM:  The atrium was mildly dilated  ATRIAL SEPTUM:  There was redundancy of the septum, with borderline criteria for aneurysm  TRICUSPID VALVE:  There was mild regurgitation  PERICARDIUM:  A small pericardial effusion was identified  There was no evidence of hemodynamic compromise  HISTORY: PRIOR HISTORY: H/o CVA, type I diabetes, hypertension     PROCEDURE: The procedure was performed at the bedside  This was a routine study  The transthoracic approach was used  The study included complete 2D imaging, M-mode, complete spectral Doppler, and color Doppler  The heart rate was 88 bpm,  at the start of the study  Images were obtained from the parasternal, apical, subcostal, and suprasternal notch acoustic windows  Image quality was adequate  LEFT VENTRICLE: Size was normal  Systolic function was normal  Ejection fraction was estimated in the range of 60 % to 65 %  There were no regional wall motion abnormalities  Wall thickness was increased  There was moderate concentric  hypertrophy  DOPPLER: Doppler parameters were consistent with abnormal left ventricular relaxation (grade 1 diastolic dysfunction)  RIGHT VENTRICLE: The size was normal  Systolic function was normal  Wall thickness was normal      LEFT ATRIUM: The atrium was mildly dilated  ATRIAL SEPTUM: There was redundancy of the septum, with borderline criteria for aneurysm  RIGHT ATRIUM: Size was normal      MITRAL VALVE: Valve structure was normal  There was normal leaflet separation  DOPPLER: The transmitral velocity was within the normal range  There was no evidence for stenosis  There was no regurgitation  AORTIC VALVE: The valve was trileaflet  Leaflets exhibited normal thickness and normal cuspal separation   DOPPLER: Transaortic velocity was within the normal range  There was no evidence for stenosis  There was no regurgitation  TRICUSPID VALVE: The valve structure was normal  There was normal leaflet separation  DOPPLER: The transtricuspid velocity was within the normal range  There was no evidence for stenosis  There was mild regurgitation  PULMONIC VALVE: Not well visualized  PERICARDIUM: A small pericardial effusion was identified  There was no evidence of hemodynamic compromise  AORTA: The root exhibited normal size  SYSTEMIC VEINS: IVC: The inferior vena cava was normal in size and course   Respirophasic changes were normal      SYSTEM MEASUREMENT TABLES     2D  %FS: 47 43 %  AV Diam: 3 51 cm  EDV(Teich): 128 82 ml  EF Biplane: 59 04 %  EF(Teich): 78 5 %  ESV(Teich): 27 7 ml  IVSd: 1 37 cm  LA Area: 20 58 cm2  LA Diam: 5 03 cm  LVEDV MOD A2C: 88 51 ml  LVEDV MOD A4C: 94 85 ml  LVEDV MOD BP: 92 67 ml  LVEF MOD A2C: 58 42 %  LVEF MOD A4C: 62 62 %  LVESV MOD A2C: 36 8 ml  LVESV MOD A4C: 35 46 ml  LVESV MOD BP: 37 96 ml  LVIDd: 5 19 cm  LVIDs: 2 73 cm  LVLd A2C: 9 27 cm  LVLd A4C: 8 86 cm  LVLs A2C: 7 3 cm  LVLs A4C: 6 59 cm  LVPWd: 1 35 cm  RA Area: 16 26 cm2  RVIDd: 3 87 cm  SV MOD A2C: 51 71 ml  SV MOD A4C: 59 39 ml  SV(Teich): 101 12 ml     MM  TAPSE: 2 68 cm     PW  MV A David: 1 04 m/s  MV Dec Henderson: 5 47 m/s2  MV DecT: 193 74 ms  MV E David: 1 06 m/s  MV E/A Ratio: 1 02  MV PHT: 56 18 ms  MVA By PHT: 3 92 cm2  TV A David: 0 47 m/s  TV Dec Henderson: 3 8 m/s2  TV Dec Time: 170 06 ms  TV E David: 0 65 m/s  TV E/A Ratio: 1 39     [employer] Accredited Echocardiography Laboratory     Prepared and electronically signed by     Triston Adames DO  Signed 23-Jan-2018 16:57:09

## 2018-04-16 NOTE — DISCHARGE INSTRUCTIONS
No driving for 24 hours  Keep loop recorder incision dry for one week  Do not use lotions/powders/creams on incision  Remove outer bandage 48 hours after procedure - if present, leave underlying steri-strips in place, they will either fall off on their own or will be removed at 2 week follow up appointment  Please call the office [phone] if you notice redness, swelling, bleeding, or drainage from incision or if you develop fevers  Cardiac Loop Recorder Insertion      WHAT YOU SHOULD KNOW:    A cardiac loop recorder is a device used to diagnose heart rhythm problems, such as a fast or irregular heartbeat  It is implanted in your left chest, just under the skin  The device records a pattern of your heart's rhythm, called an EKG  Your device records automatic EKGs, depending on how your caregiver programs it  You may also receive a handheld controller  You press a button on the controller when you have symptoms, such as dizziness, lightheadedness, or palpitations  The device will record an EKG at that moment  The recording can help your caregiver see if your symptoms may be caused by heart rhythm problems  Your caregiver will remove the device after it has collected enough data  You may need the device for up to 3 years  The procedure to remove the device is similar to the procedure used to implant it       AFTER YOU LEAVE:    Follow up with your cardiologist as directed: You will need to return in 1 to 2 weeks  Your cardiologist will check your incision  He may also program your device settings again  He will retrieve data from the device every 1 to 3 months with a monitor held over your skin  You may be able to transmit data from your device from home as well  You will do this by calling a number provided by your cardiologist, or as they have instructed you  Ask for information about this process   Write down your questions so you remember to ask them during your visits       Wound care: Keep loop recorder incision dry for one week  Do not use lotions/powders/creams on incision  Remove outer bandage 48 hours after procedure - leave underlying steri-strips in place, they will either fall off on their own or will be removed at 2 week follow up appointment  Please call the office if you notice redness, swelling, bleeding, or drainage from incision or if you develop fevers  After that first week, carefully wash your incision with soap and water  Keep the area clean and dry until it heals       Return to activity: If you received anesthesia, you will not be able to drive for 24 hours  Otherwise, most people can return to normal activities soon after the procedure  Your cardiologist may want to know if your work involves electrical current or high-voltage equipment  Ask about other electrical items that could interfere with your cardiac loop recorder       Contact your cardiologist if:   · You have a fever or chills  · Your wound is red, swollen, or draining pus  · You have questions or concerns about your condition or care  Seek care immediately or call 911 if:   · You feel weak, dizzy, or faint  · You lose consciousness  © 2014 [employer] is for End User's use only and may not be sold, redistributed or otherwise used for commercial purposes  All illustrations and images included in CareNotes® are the copyrighted property of [employer]  or Rickie Carcamo  The above information is an  only  It is not intended as medical advice for individual conditions or treatments  Talk to your doctor, nurse or pharmacist before following any medical regimen to see if it is safe and effective for you

## 2018-04-16 NOTE — TELEPHONE ENCOUNTER
Recent symptomatic orthostasis therefore AM nifedipine dose held  BP elevated today during loop recorder insertion therefore I am restarting Nifedipine at 30 mg in the AM and 60 mg in the PM  Zaid Reynolds will call with BP update

## 2018-04-18 ENCOUNTER — OFFICE VISIT (OUTPATIENT)
Dept: OCCUPATIONAL THERAPY | Facility: CLINIC | Age: 33
End: 2018-04-18
Payer: COMMERCIAL

## 2018-04-18 ENCOUNTER — OFFICE VISIT (OUTPATIENT)
Dept: PHYSICAL THERAPY | Facility: CLINIC | Age: 33
End: 2018-04-18
Payer: COMMERCIAL

## 2018-04-18 DIAGNOSIS — I63.9 CEREBROVASCULAR ACCIDENT (CVA) OF LEFT PONTINE STRUCTURE (HCC): Primary | ICD-10-CM

## 2018-04-18 PROCEDURE — 97112 NEUROMUSCULAR REEDUCATION: CPT

## 2018-04-18 PROCEDURE — 97110 THERAPEUTIC EXERCISES: CPT

## 2018-04-18 PROCEDURE — 97530 THERAPEUTIC ACTIVITIES: CPT

## 2018-04-18 NOTE — PROGRESS NOTES
Daily Note     Today's date: 2018  Patient name: Best Russell  : 1985  MRN: [id number]  Referring provider: Teena Mcnally DO  Dx:   Encounter Diagnosis     ICD-10-CM    1  Cerebrovascular accident (CVA) of left pontine structure ([address] ) [address] 50        Start Time: 1200  Stop Time: 1300  Total time in clinic (min): 60 minutes    Subjective: Patient with a loop recorder placed on Monday, reports the incision site is sore but feels better each day  Patient reports that with turning over in bed and getting out of bed at night he feels dizzy and it feels like his head is spinning, not the room  Patient reports dizziness throughout the session just feels like off-balance       Objective: See treatment diary below  Precautions: CVA with visual disturbances, CKD, DM type 1, HTN, falls     Daily Treatment Diary         Exercise Diary  3/23  3/28 4/9 4/18    Bike 10 min, L3  10 min      Treadmill         FTEO    airex 30"x3  Foam 30"x3    FAEC   airex 30"x3  Foam 30"x3    Tandem gait 3 laps   3 laps  Mini //   3 laps     Semi-tandem stance         HK marches 2# ankle weights 1 lap gonzalez  10ft // bars 4 laps no UE Arms over chest 15x on foam Arms over chest 20x on foam    STS feet staggered 15x L foot back  15x R foot back from orange chair  foam- 30x      Step taps from foam   20x ea cones      Rockerboard taps 20xea EC A/P/ lat 15x -       Heel/toe walking  Gonzalez 1 lap ea  2 laps ea      cone taps   20x side stepping  2 cones from foam 20xea    Step downs 6"        fwd/bwd walking with HTs, H/V  1 lap ea  1 lap ea       lunges 10x2 ea  15xea Deferred due to dizziness 10xea     step ups  6" foam on floor and step  10xea  8" foam 15x ea  8" foam 15x ea     knee to elbow marches with red MB       15xea     FT/EO perturbations                                          BP sitting: 160/98 mmHg  BP standin/96 mmHg    Assessment: Tolerated treatment fair with moderate exacerbation of dizziness, possible hypoglyecemia, patient at a snack at 12:50 pm  Also assessed blood pressure in sitting and standing with drop in BP with change in position to standing  Patient continues to demonstrate impaired balance, noting most difficulty with tandem gait and decreased COLBY  Plan: Progress treatment as tolerated

## 2018-04-18 NOTE — PROGRESS NOTES
Daily Note     Today's date: 2018  Patient name: Asia Squires  : 1985  MRN: [id number]  Referring provider: Fritz Hernandez DO  Dx:   Encounter Diagnosis   Name Primary?  Cerebrovascular accident (CVA) of left pontine structure ([facility] [address] ) Yes                  Subjective: "I feel I'm getting better, I don't know if its the tape or not"  Objective: See treatment diary below      Assessment: Tolerated treatment well  Patient would benefit from continued OT  Pt engaged in cluttered letter placement with focus on ocularmotor, left eye taped for 10 min, right eye taped for 10 min, peripheral field taped for 10min, completed remainder of activity untaped  Pt required additional time for task completion  Plan: Continued skilled OT per POC with focus on ocularmotor      INTERVENTION COMMENTS:  Diagnosis: Posterior Daisy CVA  Precautions: LLUVIA, HTN, DM I  Insurance: Payor: Chris Jaime / Plan: Chris Jaime / Product Type: Medicaid HMO /   9 of 24 visits, PN due 2018

## 2018-04-20 ENCOUNTER — OFFICE VISIT (OUTPATIENT)
Dept: PHYSICAL THERAPY | Facility: CLINIC | Age: 33
End: 2018-04-20
Payer: COMMERCIAL

## 2018-04-20 ENCOUNTER — OFFICE VISIT (OUTPATIENT)
Dept: OCCUPATIONAL THERAPY | Facility: CLINIC | Age: 33
End: 2018-04-20
Payer: COMMERCIAL

## 2018-04-20 DIAGNOSIS — I63.9 CEREBROVASCULAR ACCIDENT (CVA) OF LEFT PONTINE STRUCTURE (HCC): Primary | ICD-10-CM

## 2018-04-20 PROCEDURE — 97110 THERAPEUTIC EXERCISES: CPT

## 2018-04-20 PROCEDURE — 97530 THERAPEUTIC ACTIVITIES: CPT

## 2018-04-20 PROCEDURE — 97112 NEUROMUSCULAR REEDUCATION: CPT

## 2018-04-20 NOTE — PROGRESS NOTES
Daily Note     Today's date: 2018  Patient name: Cassaundra Rubinstein  : 1985  MRN: [id number]  Referring provider: Clarke Peter DO  Dx:   Encounter Diagnosis     ICD-10-CM    1  Cerebrovascular accident (CVA) of left pontine structure ([address] ) I63 50        Start Time: 0900  Stop Time: 1000  Total time in clinic (min): 60 minutes    Subjective: Patient reports no new complaints, the soreness from the loop recorder placement is improving  Objective: See treatment diary below  Precautions: CVA with visual disturbances, CKD, DM type 1, HTN, falls     Daily Treatment Diary         Exercise Diary  3/23  3/28 4/9 4/18 4/20   Bike 10 min, L3  10 min      Treadmill      1 1 mph, 0-1 UE support, 10 min   FTEO    airex 30"x3  Foam 30"x3 D/c   FAEC   airex 30"x3  Foam 30"x3 FT/EC foam 30"x3   Tandem gait 3 laps   3 laps  Mini //   3 laps  40' in gonzalez   Semi-tandem stance         HK marches 2# ankle weights 1 lap gonzalez  10ft // bars 4 laps no UE Arms over chest 15x on foam Arms over chest 20x on foam    STS feet staggered 15x L foot back  15x R foot back from orange chair  foam- 30x      Step taps from foam   20x ea cones      Rockerboard taps 20xea EC A/P/ lat 15x -       Heel/toe walking  Gonzalez 1 lap ea  2 laps ea      cone taps   20x side stepping  2 cones from foam 20xea    Step downs 6"        fwd/bwd walking with HTs, H/V  1 lap ea  1 lap ea       lunges 10x2 ea  15xea Deferred due to dizziness 10xea 10x ea    step ups  6" foam on floor and step  10xea  8" foam 15x ea  8" foam 15x ea 8" from foam, to foam 15x ea    knee to elbow marches with red MB       15xea 15x ea    FT/EO perturbations          foam FT/EO  2 min    tandem stance          30"x2 ea    backwards walking         40' - dizziness     BP sittin/92 mmHg after walking  BP standin/90 mmHg      Assessment: Tolerated treatment fair with frequent exacerbation of dizziness and LE fatigue, requiring more frequent rest breaks this session  Patient continues to demonstrate most difficulty with tandem gait, frequent LOB usually able to self correct  Attempted the TM this session which patient was able to complete with minimal UE support, but he demonstrates right lateral trunk flexion with gait and balance training  Patient demonstrated fatigue post treatment and would benefit from continued PT  Plan: Progress treatment as tolerated

## 2018-04-20 NOTE — PROGRESS NOTES
Occupational Therapy Daily Note:    Today's date: 2018  Patient name: Gillian Anderson  : 1985  MRN: [id number]  Referring provider: Rita Roberts DO  Dx:   Encounter Diagnosis   Name Primary?  Cerebrovascular accident (CVA) of left pontine structure ([address] ) Yes     Subjective: "My blood pressure has been going too low now"  Objective: See treatment diary below  Assessment: Pt seen for OT treatment session focusing on /VM re-training, binocular vision activities, taping strategies  Pt engaged in IQ Fit level 1, 25 w/ min difficulty, trialed task 49 w/ max difficulty, downgraded to level 40, mod difficulty w/ inability to complete, downgraded again to level 30 w/ min difficulty w/ successful completion  Pt engaged in taping strategies R eye occluded x10 minutes, L eye occluded x10 minutes, B/L peripheral field occluded x10 minutes, remainder of session binocular vision engagement t/o Josephus Bath worksheets: Trailmaking, [address] [facility] [address] for R/L laterality completed 50% of task  C/o feeling "blurred vision" after task and slightly dizzy, PT reported + orthostatic hypotensive, MD aware to adjust BP meds  Pt is currently demonstrating the following occupational deficits: limited 2* diplopia @ 12", PLRG @ 3", resting L esophoria, decreased teaming L eye, L eye does not cross midline from midline to far L VF, jerky pursuits in all planes, dysmetric saccades, L conjugate gaze ocular palsy, binocular vision impairments, shifted below horizon w/ L head tilt persistent  Tolerated treatment well  Patient would benefit from continued skilled OT  Plan: Continued skilled OT per POC with focus on /VM re-training, ocular motor re-training      INTERVENTION COMMENTS:  Diagnosis: CVA  Precautions: Acute Kidney injury, HTN, DM  Insurance: Payor: Nacho Gonzalez / Plan: Nacho Gonzalez / Product Type: Medicaid HMO /   24 KR 27 MFOJEF, PN due 2018    Thank you for the consult!   Please call if you have any questions: [phone]  SVEN Montanez, OTR/L, C-GCM, CSRS  Director of Outpatient Neuro Occupational Therapy

## 2018-04-23 ENCOUNTER — OFFICE VISIT (OUTPATIENT)
Dept: OCCUPATIONAL THERAPY | Facility: CLINIC | Age: 33
End: 2018-04-23
Payer: COMMERCIAL

## 2018-04-23 ENCOUNTER — OFFICE VISIT (OUTPATIENT)
Dept: PHYSICAL THERAPY | Facility: CLINIC | Age: 33
End: 2018-04-23
Payer: COMMERCIAL

## 2018-04-23 DIAGNOSIS — I63.9 CEREBROVASCULAR ACCIDENT (CVA) OF LEFT PONTINE STRUCTURE (HCC): Primary | ICD-10-CM

## 2018-04-23 PROCEDURE — 97110 THERAPEUTIC EXERCISES: CPT

## 2018-04-23 PROCEDURE — 97112 NEUROMUSCULAR REEDUCATION: CPT

## 2018-04-23 PROCEDURE — 97535 SELF CARE MNGMENT TRAINING: CPT

## 2018-04-23 NOTE — PROGRESS NOTES
Daily Note     Today's date: 2018  Patient name: Sourav Isaacs  : 1985  MRN: [id number]  Referring provider: Niesha Moreno DO  Dx:   Encounter Diagnosis   Name Primary?  Cerebrovascular accident (CVA) of left pontine structure [facility]) Yes                  Patient was treated by COREY Rojas and was under direct supervision of Denny Rose 27, C[place]/L  Subjective: "I haven't been having any headaches  I've been getting lightheaded though because the doctor changed my blood pressure med "      Objective: See treatment diary below      Assessment: Tolerated treatment well  I Spy at slantboard with L eye occluded x10min followed by R eye occluded x10min for further strengthening for binocular fusion and saccades  Noted with mod difficulty with pt able to complete 1 page in 35min  Bug search in supine with B/L peripheral field occluded x10min for sustained convergence and saccades with no verbal cues required and average pace  Plan: Continued skilled OT per POC with focus on oculomotor control      INTERVENTION COMMENTS:  Diagnosis: Cerebrovascular accident (CVA) of left pontine structure ([address] ) [[address]63 50]  Precautions: Acute Kidney injury, HTN, DM  Insurance: Payor: Key Gurrola / Plan: Key Gurrola / Product Type: Medicaid HMO /   FOTO:  11 of 24 visits, PN due 18

## 2018-04-23 NOTE — PROGRESS NOTES
Daily Note     Today's date: 2018  Patient name: Byron Emerson  : 1985  MRN: [id number]  Referring provider: Arzella Hamman, DO  Dx:   Encounter Diagnosis     ICD-10-CM    1  Cerebrovascular accident (CVA) of left pontine structure ([facility] [address] ) [address] 50        Start Time: 1200  Stop Time: 1300  Total time in clinic (min): 60 minutes    Subjective: Patient report his BP and blood sugar were both high this morning  Patient took a BP pill and it seemed like it was too low   Patient denies dizziness or       Objective: See treatment diary below  Precautions: CVA with visual disturbances, CKD, DM type 1, HTN, falls     Daily Treatment Diary         Exercise Diary     Bike        Treadmill 1 4 mph, 3% incline, 0-1 UE 10 min    1 1 mph, 0-1 UE support, 10 min   FTEO     Foam 30"x3 D/c   FTEC, foam 30" x 2   Foam 30"x3 FT/EC foam 30"x3   Tandem gait   3 laps  40' in gonzalez   Semi-tandem stance        HK marches   Arms over chest 15x on foam Arms over chest 20x on foam    STS feet staggered        Step taps from foam        Rockerboard taps        Heel/toe walking    2 laps ea      cone taps    2 cones from foam 20xea    Step downs        fwd/bwd walking with HTs, H/V         lunges   Deferred due to dizziness 10xea 10x ea    step ups    8" foam 15x ea 8" from foam, to foam 15x ea    knee to elbow marches with red MB 40' x 1    15xea 15x ea    FT/EO perturbations        foam FT/EO  2 min    tandem stance        30"x2 ea    backwards walking       40' - dizziness   VOR x 1, large targert Seated, plain  30" ea       Saccades, large targets Seated, plain, 60" ea       FT/EO HT/HN 30" ea                         BP sitting, following walk in, pre-treatment: 184/92 mmHg   BP sitting, resting, pre-treatment: 166/84 mmHg  BP sitting, resting, post-treatment: 154/86 mmHg    VOMS (Vestibular/Ocular Motor Screen)     Baseline symptoms: dizziness     Smooth pursuit Abnormal, notes constant diplopia      Saccades (horizontal) Abnormal     Saccades (vertical)  Abnormal     Convergence (near point)--Abnormal trial, left convergence insufficiency, right eye with left horizontal nystagmus at end range convergence      VOR (horizontal) Abnormal with head thrust to the right        Assessment: Tolerated treatment fair with continued exacerbation of dizziness  Performed a vestibular/occulomotor exam this session and patient presents with evidence of a vestibular hypofunction secondary to the CVA  Initiated vestibular treatment this session with ability to complete exercises but mild exacerbation of dizziness  Issued HEP of VOR x 1 with patient understanding  Patient demonstrated fatigue post treatment and would benefit from continued PT      Plan: Progress treatment as tolerated  Incorporate vestibular exercises in future treatment

## 2018-04-25 ENCOUNTER — TELEPHONE (OUTPATIENT)
Dept: NEUROLOGY | Facility: CLINIC | Age: 33
End: 2018-04-25

## 2018-04-25 ENCOUNTER — OFFICE VISIT (OUTPATIENT)
Dept: PHYSICAL THERAPY | Facility: CLINIC | Age: 33
End: 2018-04-25
Payer: COMMERCIAL

## 2018-04-25 ENCOUNTER — APPOINTMENT (OUTPATIENT)
Dept: LAB | Facility: CLINIC | Age: 33
End: 2018-04-25
Payer: COMMERCIAL

## 2018-04-25 ENCOUNTER — OFFICE VISIT (OUTPATIENT)
Dept: OCCUPATIONAL THERAPY | Facility: CLINIC | Age: 33
End: 2018-04-25
Payer: COMMERCIAL

## 2018-04-25 DIAGNOSIS — I63.9 CEREBROVASCULAR ACCIDENT (CVA) OF LEFT PONTINE STRUCTURE (HCC): Primary | ICD-10-CM

## 2018-04-25 DIAGNOSIS — N18.30 CKD (CHRONIC KIDNEY DISEASE), STAGE III (HCC): ICD-10-CM

## 2018-04-25 LAB
25(OH)D3 SERPL-MCNC: 24.9 NG/ML (ref 30–100)
ALBUMIN SERPL BCP-MCNC: 3.2 G/DL (ref 3.5–5)
ALP SERPL-CCNC: 81 U/L (ref 46–116)
ALT SERPL W P-5'-P-CCNC: 20 U/L (ref 12–78)
ANION GAP SERPL CALCULATED.3IONS-SCNC: 8 MMOL/L (ref 4–13)
AST SERPL W P-5'-P-CCNC: 14 U/L (ref 5–45)
BACTERIA UR QL AUTO: ABNORMAL /HPF
BILIRUB SERPL-MCNC: 0.4 MG/DL (ref 0.2–1)
BILIRUB UR QL STRIP: NEGATIVE
BUN SERPL-MCNC: 52 MG/DL (ref 5–25)
CALCIUM SERPL-MCNC: 9 MG/DL (ref 8.3–10.1)
CHLORIDE SERPL-SCNC: 105 MMOL/L (ref 100–108)
CLARITY UR: CLEAR
CO2 SERPL-SCNC: 25 MMOL/L (ref 21–32)
COLOR UR: YELLOW
CREAT SERPL-MCNC: 4.32 MG/DL (ref 0.6–1.3)
CREAT UR-MCNC: 60.4 MG/DL
ERYTHROCYTE [DISTWIDTH] IN BLOOD BY AUTOMATED COUNT: 13 % (ref 11.6–15.1)
GFR SERPL CREATININE-BSD FRML MDRD: 17 ML/MIN/1.73SQ M
GLUCOSE P FAST SERPL-MCNC: 302 MG/DL (ref 65–99)
GLUCOSE UR STRIP-MCNC: ABNORMAL MG/DL
HCT VFR BLD AUTO: 26.6 % (ref 36.5–49.3)
HGB BLD-MCNC: 8.9 G/DL (ref 12–17)
HGB UR QL STRIP.AUTO: NEGATIVE
KETONES UR STRIP-MCNC: NEGATIVE MG/DL
LEUKOCYTE ESTERASE UR QL STRIP: ABNORMAL
MAGNESIUM SERPL-MCNC: 2.5 MG/DL (ref 1.6–2.6)
MCH RBC QN AUTO: 29.5 PG (ref 26.8–34.3)
MCHC RBC AUTO-ENTMCNC: 33.5 G/DL (ref 31.4–37.4)
MCV RBC AUTO: 88 FL (ref 82–98)
NITRITE UR QL STRIP: NEGATIVE
NON-SQ EPI CELLS URNS QL MICRO: ABNORMAL /HPF
PH UR STRIP.AUTO: 7 [PH] (ref 4.5–8)
PHOSPHATE SERPL-MCNC: 4.6 MG/DL (ref 2.7–4.5)
PLATELET # BLD AUTO: 258 THOUSANDS/UL (ref 149–390)
PMV BLD AUTO: 9.6 FL (ref 8.9–12.7)
POTASSIUM SERPL-SCNC: 5 MMOL/L (ref 3.5–5.3)
PROT SERPL-MCNC: 5.9 G/DL (ref 6.4–8.2)
PROT UR STRIP-MCNC: >=300 MG/DL
PROT UR-MCNC: 266 MG/DL
PROT/CREAT UR: 4.4 MG/G{CREAT} (ref 0–0.1)
PTH-INTACT SERPL-MCNC: 164.6 PG/ML (ref 18.4–80.1)
RBC # BLD AUTO: 3.02 MILLION/UL (ref 3.88–5.62)
RBC #/AREA URNS AUTO: ABNORMAL /HPF
SODIUM SERPL-SCNC: 138 MMOL/L (ref 136–145)
SP GR UR STRIP.AUTO: 1.02 (ref 1–1.03)
UROBILINOGEN UR QL STRIP.AUTO: 0.2 E.U./DL
WBC # BLD AUTO: 6.34 THOUSAND/UL (ref 4.31–10.16)
WBC #/AREA URNS AUTO: ABNORMAL /HPF

## 2018-04-25 PROCEDURE — 83970 ASSAY OF PARATHORMONE: CPT

## 2018-04-25 PROCEDURE — 36415 COLL VENOUS BLD VENIPUNCTURE: CPT

## 2018-04-25 PROCEDURE — 97112 NEUROMUSCULAR REEDUCATION: CPT | Performed by: PHYSICAL THERAPIST

## 2018-04-25 PROCEDURE — 80053 COMPREHEN METABOLIC PANEL: CPT

## 2018-04-25 PROCEDURE — 82570 ASSAY OF URINE CREATININE: CPT

## 2018-04-25 PROCEDURE — 84156 ASSAY OF PROTEIN URINE: CPT

## 2018-04-25 PROCEDURE — 81001 URINALYSIS AUTO W/SCOPE: CPT

## 2018-04-25 PROCEDURE — 85027 COMPLETE CBC AUTOMATED: CPT

## 2018-04-25 PROCEDURE — 84100 ASSAY OF PHOSPHORUS: CPT

## 2018-04-25 PROCEDURE — 97530 THERAPEUTIC ACTIVITIES: CPT

## 2018-04-25 PROCEDURE — 97112 NEUROMUSCULAR REEDUCATION: CPT

## 2018-04-25 PROCEDURE — 83735 ASSAY OF MAGNESIUM: CPT

## 2018-04-25 PROCEDURE — 97110 THERAPEUTIC EXERCISES: CPT | Performed by: PHYSICAL THERAPIST

## 2018-04-25 PROCEDURE — 82306 VITAMIN D 25 HYDROXY: CPT

## 2018-04-25 NOTE — PROGRESS NOTES
Daily Note     Today's date: 2018  Patient name: Wellington Gaona  : 1985  MRN: [id number]  Referring provider: Jessie Roche, DO  Dx:   Encounter Diagnosis   Name Primary?  Cerebrovascular accident (CVA) of left pontine structure ([facility] ) Yes                  Subjective: "Its hard for me to see in my peripheral "      Objective: See treatment diary below      Assessment: Tolerated treatment well  Patient would benefit from continued OT  Pt completed ocularmotor tasks with focus on tracking with L eye occluded for 10min, R eye occluded for 10 min, peripheral occluded for 10 min, no change in vision reported by pt  Pt engaged in peripheral vision activity with sustained gaze on central focal point, pt needed to recall specific shape/letter in peripheral, mod difficulty  Pt engaged in same task, reading letters from central focal point w/recall of specific letter highlighted with flashlight, max difficulty, frequently unable to recall letters in peripheral vision  Pt engaged in ball bounce with focus on tracking and eye/hand coordination, improvement noted with mass practice and with b/l integration          Plan: Continued skilled OT per POC with focus on ocularmotor    INTERVENTION COMMENTS:  Diagnosis: Cerebrovascular accident (CVA) of left pontine structure ([facility] ) [I63 50]  Precautions: Acute Kidney injury, HTN, DM  Insurance: Payor: Patria Phan / Plan: Patria Phan / Product Type: Medicaid HMO /   12 of 24 visits, PN due 18

## 2018-04-25 NOTE — TELEPHONE ENCOUNTER
Jo Smith from Dr Valente Carranza office [phone] called to obtain reports of any scans and office notes before patient's appointment tomorrow    Was referred to Dr Valente Carranza office by Wilton Mccord to  [phone]

## 2018-04-25 NOTE — PROGRESS NOTES
Daily Note     Today's date: 2018  Patient name: Darlene Cabral  : 1985  MRN: [id number]  Referring provider: Chapin Hoffman DO  Dx:   Encounter Diagnosis     ICD-10-CM    1  Cerebrovascular accident (CVA) of left pontine structure ([address] ) I63 50                   Subjective: Denies complaints  Soreness from loop recorder a little more yest, unsure why  Objective: See treatment diary below  Precautions: CVA with visual disturbances, CKD, DM type 1, HTN, falls     Daily Treatment Diary         Exercise Diary  18       Bike        Treadmill 2 0 with 1 UE  x10 min       FTEO         FAEC        Tandem gait // bars  Arms crossed, EO  3 laps       Semi-tandem stance Foam, arms crossed, EC  4x30s       HK marches        STS feet staggered        Step taps from foam 8"  x30 alt heels       Rockerboard taps M/L & A/P  1 min ea       Heel/toe walking          cone taps        Step downs        fwd/bwd walking with HTs, H/V BWD with [employer] HT  2x40', in [place]       [employer] in [place], 40' + 20'        step ups R/L Step-overs  6" & foam  2x10 ea        knee to elbow marches with red MB         FT/EO perturbations         tandem stance        Foam twists, R/L and up/down Arms crossed, Eo, x1min         BP after lunges 163/88, Hr: 103      Assessment: Pt able to increase speed on TM with good balance and proper step length, did not lean onto right side  Did require 1 UE support for balance  Progressed to some activities in [place] which pt required Iveth with to assist with balance due to not having UE support  Had increased difficulty with walking lunges due to LE weakness and decreased balance, unable to finish second rep due to c/o fatigue and dizziness  Vitals taken and per pt within his normal, symptoms possibly due to pt holding his breath with exertion  Plan: Continue with increased TM speed

## 2018-04-26 ENCOUNTER — TELEPHONE (OUTPATIENT)
Dept: NEUROLOGY | Facility: CLINIC | Age: 33
End: 2018-04-26

## 2018-04-26 NOTE — TELEPHONE ENCOUNTER
Received call from Dr Sheyla Lu office  They sent several faxes and made several phone calls to request medical records  Nothing noted in chart  We sent the referral we should have sent medical records as well   Sent over office note and sent a stat request to [id number] for all medical records to be sent to Dr Sheyla Lu office [address] [facility] [phone] [address] [phone]

## 2018-04-27 NOTE — PROGRESS NOTES
Occupational Therapy Daily Note:    Today's date: 2018  Patient name: Alia Waller  : 1985  MRN: [id number]  Referring provider: Alex Yang DO  Dx:   Encounter Diagnosis   Name Primary?  Cerebrovascular accident (CVA) of left pontine structure ([facility] [address] ) Yes     Subjective: "I saw the neuro eye doctor and he gave me this patch I'm just concerned it's not going to strengthen my eyes because with OT I know we're working on both eyes working together"  Objective: See treatment diary below  Assessment: Pt seen for OT treatment session focusing on convergence/divergence two column saccades board to table w/ tracking tube use  Pt arrived w/ eye patch from recent appt w/ neuro "eye doctor" unable to clarify if neuro optometry vs neuroopthamologist "in [place] not sure"  Explained pt needed more OT/PT appts, will schedule after this OT session  Also discussed need for re-eval next week to determine continued OT services for ongoing /VM re-training w/ likely need for ultimate external referral for vision therapy  Pt understands and agreeable  Pt contacted mother re: doctor seen, reported Dr Latoya Crouch opthamologist, has f/u appt ~1 month  Pt engaged in taping strategies R eye occluded x10 minutes, L eye occluded x10 minutes, B/L peripheral field occluded x10 minutes, remainder of session binocular vision engagement t/o alternating /VM tasks I e  Pixy Cubes tabletop and pegboard pattern construction via tabletop pegboard pattern convergence w/ divergence to board template @ eye height w/ instruction to color in blocks on template w/ colored markers to replicate pattern  Pt instructed to complete 5 Pixy cubes cards per trial and 1 pegboard pattern per trial w/ alternation of tasks  Pt denied HA post session      Pt is currently demonstrating the following occupational deficits: limited 2* diplopia @ 12", PLRG @ 3", resting L esophoria, decreased teaming L eye, L eye does not cross midline from midline to far L VF, jerky pursuits in all planes, dysmetric saccades, L conjugate gaze ocular palsy, binocular vision impairments, shifted below horizon w/ L head tilt persistent  Tolerated treatment well  Patient would benefit from continued skilled OT      Plan: Continued skilled OT per POC with focus on /VM re-training, ocular motor re-training      INTERVENTION COMMENTS:  Diagnosis: CVA  Precautions: Acute Kidney injury, HTN, DM  Insurance: Payor: Nahum Mckeon / Plan: Nahum Mckeon / Product Type: Medicaid O /   13 of 58 XBCNEK, PN due 5/9/2018     Thank you for the consult!   Please call if you have any questions: [phone]  SVEN Garcia, OTR/L, C-GCM, CSRS  Director of Outpatient Neuro Occupational Therapy

## 2018-04-30 ENCOUNTER — OFFICE VISIT (OUTPATIENT)
Dept: PHYSICAL THERAPY | Facility: CLINIC | Age: 33
End: 2018-04-30
Payer: COMMERCIAL

## 2018-04-30 ENCOUNTER — OFFICE VISIT (OUTPATIENT)
Dept: OCCUPATIONAL THERAPY | Facility: CLINIC | Age: 33
End: 2018-04-30
Payer: COMMERCIAL

## 2018-04-30 DIAGNOSIS — I63.9 CEREBROVASCULAR ACCIDENT (CVA) OF LEFT PONTINE STRUCTURE (HCC): Primary | ICD-10-CM

## 2018-04-30 PROCEDURE — 97112 NEUROMUSCULAR REEDUCATION: CPT

## 2018-04-30 PROCEDURE — 97530 THERAPEUTIC ACTIVITIES: CPT

## 2018-04-30 PROCEDURE — 97110 THERAPEUTIC EXERCISES: CPT

## 2018-04-30 NOTE — PROGRESS NOTES
Daily Note     Today's date: 2018  Patient name: Darlene Cabral  : 1985  MRN: [id number]  Referring provider: Chapin Hoffman DO  Dx:   Encounter Diagnosis     ICD-10-CM    1  Cerebrovascular accident (CVA) of left pontine structure ([address] ) I63 50        Subjective: States that he saw neuro opthamologist who told him to wear right eye cover for glasses  Objective: See treatment diary below  Precautions: CVA with visual disturbances, CKD, DM type 1, HTN, falls     Daily Treatment Diary         Exercise Diary  18      Bike        Treadmill 2 0 with 1 UE  x10 min 2 2 mph, 1 UE, 10 min      FTEO         FAEC  FTEC on foam, 30''x3      Tandem gait // bars  Arms crossed, EO  3 laps 3 laps      Semi-tandem stance Foam, arms crossed, EC  4x30s Foam, EO, 30''x2      HK marches        STS feet staggered        Step taps from foam 8"  x30 alt heels       Rockerboard taps M/L & A/P  1 min ea 20x      Heel/toe walking          cone taps        Step downs        fwd/bwd walking with HTs, H/V BWD with horzion HT  2x40', in [place]       [employer] in [place], 36' + 20' 2 laps       step ups R/L Step-overs  6" & foam  2x10 ea Step-overs, 6'' & foam, 2x10       knee to elbow marches with red MB         FT/EO perturbations         tandem stance        VORx1-seated-plain (big letters)  30''x2      Saccades-seated-plain (big letters)  60''x1      Foam twists, R/L and up/down Arms crossed, Eo, x1min             Assessment: Patient had some minimal exacerbation of dizziness with oculomotor, consider progressing time and challenge as able  Patient demonstrates noted instability and LOB  Requires use of grab bar or stepping strategy to correct  Very challenge with NBOS and EC activities  Noted fatigue requiring seated rest breaks during session  Plan: Continue to progress as able

## 2018-05-01 ENCOUNTER — IN-CLINIC DEVICE VISIT (OUTPATIENT)
Dept: CARDIOLOGY CLINIC | Facility: CLINIC | Age: 33
End: 2018-05-01

## 2018-05-01 ENCOUNTER — OFFICE VISIT (OUTPATIENT)
Dept: NEPHROLOGY | Facility: CLINIC | Age: 33
End: 2018-05-01
Payer: COMMERCIAL

## 2018-05-01 VITALS — BODY MASS INDEX: 29.26 KG/M2 | WEIGHT: 209 LBS | HEIGHT: 71 IN

## 2018-05-01 DIAGNOSIS — N18.4 ANEMIA IN STAGE 4 CHRONIC KIDNEY DISEASE (HCC): ICD-10-CM

## 2018-05-01 DIAGNOSIS — Z86.73 HISTORY OF LACUNAR CEREBROVASCULAR ACCIDENT (CVA): Chronic | ICD-10-CM

## 2018-05-01 DIAGNOSIS — I15.0 RENOVASCULAR HYPERTENSION: ICD-10-CM

## 2018-05-01 DIAGNOSIS — E87.5 HYPERKALEMIA: ICD-10-CM

## 2018-05-01 DIAGNOSIS — D63.1 ANEMIA IN STAGE 4 CHRONIC KIDNEY DISEASE (HCC): ICD-10-CM

## 2018-05-01 DIAGNOSIS — N18.4 CHRONIC KIDNEY DISEASE, STAGE 4 (SEVERE) (HCC): Chronic | ICD-10-CM

## 2018-05-01 DIAGNOSIS — E10.21 TYPE 1 DIABETES MELLITUS WITH DIABETIC NEPHROPATHY, WITH LONG-TERM CURRENT USE OF INSULIN (HCC): Primary | Chronic | ICD-10-CM

## 2018-05-01 DIAGNOSIS — Z86.73 PERSONAL HISTORY OF TRANSIENT CEREBRAL ISCHEMIA: Primary | ICD-10-CM

## 2018-05-01 DIAGNOSIS — Z95.818 PRESENCE OF OTHER CARDIAC IMPLANTS AND GRAFTS: ICD-10-CM

## 2018-05-01 PROBLEM — N18.30 ANEMIA IN STAGE 3 CHRONIC KIDNEY DISEASE (HCC): Chronic | Status: RESOLVED | Noted: 2018-02-10 | Resolved: 2018-05-01

## 2018-05-01 PROCEDURE — 99024 POSTOP FOLLOW-UP VISIT: CPT | Performed by: INTERNAL MEDICINE

## 2018-05-01 PROCEDURE — 99214 OFFICE O/P EST MOD 30 MIN: CPT | Performed by: INTERNAL MEDICINE

## 2018-05-01 NOTE — PROGRESS NOTES
NEPHROLOGY OFFICE VISIT   Caio Mckeon 27 y o  male MRN: [id number]  5/1/2018    Reason for Visit: CKD    ASSESSMENT and PLAN:    I had the pleasure of seeing Amy Jefferson today in the renal clinic for the continued management of chronic kidney disease  Since our last visit, there has been no ER visits or hospitilizations  He currently has no complaints at this time and is feeling well  Patient denies any chest pain, shortness of breath and swelling  The last blood work was done on April 2018, which we have reviewed together  His blood pressure log has been reviewed  Is systolic blood pressure has been ranging between 150-160 on average with an occasional 180 reading  His diastolic has been ranging between 85-95  He did attend Kidney Smart class  I did have discussions about hemodialysis, peritoneal dialysis and kidney transplant  I had seen the patient before in the hospital   This is my 1st outpatient visit with him  1 )  Chronic kidney disease stage IV  -his renal dysfunction has worsened but overall has plateaued at a higher baseline creatinine between 3 5-4 5 mg/dL  -no uremic symptoms at this time    I did discuss the findings of uremia with him  -he has attended Kidney Smart class  -presumed etiology is diabetic nephropathy and hypertensive nephrosclerosis  -prior nephrologist: Dr Janak Durbin  -avoid NSAIDs  -blood pressure and diabetic control  -avoid Ace inhibitor and ARB due to hyperkalemia  -preferred modality of dialysis is peritoneal dialysis  -refer to Dr Adam Reyez for [facility] transplant evaluation    2 )  Resistant hypertension  -blood pressure overall improved, he does feel symptomatic when his blood pressure is less than 130 as it makes him feel dizzy and lightheaded  -at this time aim to keep his systolic blood pressure between 140-160  -continue hydralazine 100 mg 3 times a day  -continue labetalol 150 mg in the morning and afternoon and 300 mg in the evening  -continue torsemide 10 mg daily  -continue nifedipine 30 mg in the morning and 60 mg in the evening  -continue chlorthalidone 50 mg daily  -low 2 g sodium diet    3 )  Anemia of chronic kidney disease  -will start Epogen when his hemoglobin drops below 8  -will order iron studies at the next visit    4 )  Mineral bone disorder-chronic kidney disease  -low phosphorus diet  -order PTH and phosphorus at the next visit  -completed a course of ergo calciferol 13219 units, can start vitamin-D 2000 units daily as maintenance    5 )  Hyperhomocystinemia    6 )  History of CVA  -lacunar infarct  -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, 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, underwent plasmapheresis  -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 and also heterozygote for prothrombin gene mutation  7 )  Diabetes mellitus type 1    PATIENT INSTRUCTIONS:    Patient Instructions   1 )  Low 2 g sodium diet    2 )  Monitor weights at home    3 )  Avoid NSAIDs    4 )  Monitor blood pressure at home, call if blood pressure greater than 150/90 persistently              Orders Placed This Encounter   Procedures    Comprehensive metabolic panel     This is a patient instruction: Patient fasting for 8 hours or longer recommended  Standing Status:   Future     Standing Expiration Date:   5/1/2019    Ambulatory referral to renal transplant evaluation     Standing Status:   Future     Standing Expiration Date:   11/1/2018     Referral Priority:   Routine     Referral Type:   Consult - AMB     Referral Reason:   Specialty Services Required     Requested Specialty:   Nephrology     Number of Visits Requested:   1     Expiration Date:   5/1/2019       OBJECTIVE:  Current Weight: Weight - Scale: 94 8 kg (209 lb)  Vitals:    05/01/18 1457   Weight: 94 8 kg (209 lb)   Height: 5' 11" (1 803 m)    Body mass index is 29 15 kg/m²        REVIEW OF SYSTEMS:    Review of Systems   Constitutional: Negative for activity change and fever  Respiratory: Negative for cough, chest tightness, shortness of breath and wheezing  Cardiovascular: Negative for chest pain and leg swelling  Gastrointestinal: Negative for abdominal pain, diarrhea, nausea and vomiting  Endocrine: Negative for polyuria  Genitourinary: Negative for difficulty urinating, dysuria, flank pain, frequency and urgency  Skin: Negative for rash  Neurological: Negative for dizziness, syncope, light-headedness and headaches  PHYSICAL EXAM:      Physical Exam   Constitutional: He is oriented to person, place, and time  He appears well-developed and well-nourished  No distress  HENT:   Head: Normocephalic and atraumatic  Eyes: Pupils are equal, round, and reactive to light  No scleral icterus  Neck: Normal range of motion  Neck supple  Cardiovascular: Normal rate, regular rhythm and normal heart sounds  Exam reveals no gallop and no friction rub  No murmur heard  Pulmonary/Chest: Effort normal and breath sounds normal  No respiratory distress  He has no wheezes  He has no rales  He exhibits no tenderness  Abdominal: Soft  Bowel sounds are normal  He exhibits no distension  There is no tenderness  There is no rebound  Musculoskeletal: Normal range of motion  He exhibits no edema  Neurological: He is alert and oriented to person, place, and time  Skin: No rash noted  He is not diaphoretic  Psychiatric: He has a normal mood and affect  Nursing note and vitals reviewed        Medications:    Current Outpatient Prescriptions:     atorvastatin (LIPITOR) 40 mg tablet, Take 1 tablet (40 mg total) by mouth every evening, Disp: 30 tablet, Rfl: 5    B-D ULTRAFINE III SHORT PEN 31G X 8 MM MISC, Inject under the skin 4 (four) times a day, Disp: 400 each, Rfl: 5    chlorthalidone (HYGROTEN) 50 MG tablet, Take 1 tablet (50 mg total) by mouth daily, Disp: 30 tablet, Rfl: 0    clopidogrel (PLAVIX) 75 mg tablet, Take 1 tablet (75 mg total) by mouth daily, Disp: 30 tablet, Rfl: 5    escitalopram (LEXAPRO) 10 mg tablet, Take 1 tablet (10 mg total) by mouth daily, Disp: 30 tablet, Rfl: 5    folic acid (FOLVITE) 1 mg tablet, Take 1 tablet (1 mg total) by mouth daily, Disp: 30 tablet, Rfl: 5    hydrALAZINE (APRESOLINE) 100 MG tablet, Take 1 tablet (100 mg total) by mouth 3 (three) times a day, Disp: 270 tablet, Rfl: 3    insulin glargine (LANTUS) 100 units/mL subcutaneous injection, Inject 8 Units under the skin daily with breakfast, Disp: 10 mL, Rfl: 0    insulin lispro (HumaLOG) 100 units/mL injection, Inject 5 Units under the skin 3 (three) times a day with meals U-100 pen, Disp: 10 mL, Rfl: 2    labetalol (NORMODYNE) 300 mg tablet, Take 0 5 tablets (150 mg total) by mouth every 8 (eight) hours (Patient taking differently: Take 150 mg by mouth every 8 (eight) hours Take 150 mg  In AM, 150 mg  At 2:00 PM and 300 mg  At 10:00 PM ), Disp: 60 tablet, Rfl: 3    NIFEdipine ER (ADALAT CC) 60 MG 24 hr tablet, Take 1 tablet (60 mg total) by mouth every evening (Patient taking differently: Take 60 mg by mouth every evening Take 30 mg   In AM, take 60 mg  @ 4PM ), Disp: 180 tablet, Rfl: 3    ondansetron (ZOFRAN) 4 mg tablet, Take 1 tablet (4 mg total) by mouth every 8 (eight) hours as needed for nausea or vomiting, Disp: 20 tablet, Rfl: 0    sodium bicarbonate 650 mg tablet, Take 1 tablet (650 mg total) by mouth 4 (four) times a day, Disp: 120 tablet, Rfl: 6    torsemide (DEMADEX) 20 mg tablet, Take 1 tablet (20 mg total) by mouth every other day (Patient taking differently: Take 10 mg by mouth daily 1/2 tablet daily ), Disp: 90 tablet, Rfl: 3    Laboratory Results:    Results from last 7 days  Lab Units 04/25/18  0634   WBC Thousand/uL 6 34   HEMOGLOBIN g/dL 8 9*   HEMATOCRIT % 26 6*   PLATELETS Thousands/uL 258   SODIUM mmol/L 138   POTASSIUM mmol/L 5 0   CHLORIDE mmol/L 105   CO2 mmol/L 25   BUN mg/dL 52*   CREATININE mg/dL 4 32*   CALCIUM mg/dL 9  0   MAGNESIUM mg/dL 2 5   PHOSPHORUS mg/dL 4 6*   TOTAL PROTEIN g/dL 5 9*       Results for orders placed or performed in visit on 85/37/01   Basic metabolic panel   Result Value Ref Range    Sodium 135 (L) 136 - 145 mmol/L    Potassium 4 8 3 5 - 5 3 mmol/L    Chloride 103 100 - 108 mmol/L    CO2 23 21 - 32 mmol/L    Anion Gap 9 4 - 13 mmol/L    BUN 45 (H) 5 - 25 mg/dL    Creatinine 4 36 (H) 0 60 - 1 30 mg/dL    Glucose 277 (H) 65 - 140 mg/dL    Calcium 8 5 mg/dL    eGFR 16 ml/min/1 73sq m   Comprehensive metabolic panel   Result Value Ref Range    Sodium 138 136 - 145 mmol/L    Potassium 5 0 3 5 - 5 3 mmol/L    Chloride 105 100 - 108 mmol/L    CO2 25 21 - 32 mmol/L    Anion Gap 8 4 - 13 mmol/L    BUN 52 (H) 5 - 25 mg/dL    Creatinine 4 32 (H) 0 60 - 1 30 mg/dL    Glucose, Fasting 302 (H) 65 - 99 mg/dL    Calcium 9 0 8 3 - 10 1 mg/dL    AST 14 5 - 45 U/L    ALT 20 12 - 78 U/L    Alkaline Phosphatase 81 46 - 116 U/L    Total Protein 5 9 (L) 6 4 - 8 2 g/dL    Albumin 3 2 (L) 3 5 - 5 0 g/dL    Total Bilirubin 0 40 0 20 - 1 00 mg/dL    eGFR 17 ml/min/1 73sq m   Urinalysis with reflex to microscopic   Result Value Ref Range    Color, UA Yellow     Clarity, UA Clear     Specific Gravity, UA 1 020 1 003 - 1 030    pH, UA 7 0 4 5 - 8 0    Leukocytes, UA (A) Negative     Elevated glucose may cause decreased leukocyte values   See urine microscopic for [facility] result/    Nitrite, UA Negative Negative    Protein, UA >=300 (A) Negative mg/dl    Glucose, UA >=1000 (1%) (A) Negative mg/dl    Ketones, UA Negative Negative mg/dl    Urobilinogen, UA 0 2 0 2, 1 0 E U /dl E U /dl    Bilirubin, UA Negative Negative    Blood, UA Negative Negative   Protein / creatinine ratio, urine   Result Value Ref Range    Creatinine, Ur 60 4 mg/dL    Protein Urine Random 266 mg/dL    Prot/Creat Ratio, Ur 4 40 (H) 0 00 - 0 10   CBC   Result Value Ref Range    WBC 6 34 4 31 - 10 16 Thousand/uL    RBC 3 02 (L) 3 88 - 5 62 Million/uL    Hemoglobin 8 9 (L) 12 0 - 17 0 g/dL    Hematocrit 26 6 (L) 36 5 - 49 3 %    MCV 88 82 - 98 fL    MCH 29 5 26 8 - 34 3 pg    MCHC 33 5 31 4 - 37 4 g/dL    RDW 13 0 11 6 - 15 1 %    Platelets 974 485 - 715 Thousands/uL    MPV 9 6 8 9 - 12 7 fL   Magnesium   Result Value Ref Range    Magnesium 2 5 1 6 - 2 6 mg/dL   Phosphorus   Result Value Ref Range    Phosphorus 4 6 (H) 2 7 - 4 5 mg/dL   PTH, intact   Result Value Ref Range     6 (H) 18 4 - 80 1 pg/mL   Vitamin D 25 hydroxy   Result Value Ref Range    Vit D, 25-Hydroxy 24 9 (L) 30 0 - 100 0 ng/mL   Urine Microscopic   Result Value Ref Range    RBC, UA 1-2 (A) None Seen, 0-5 /hpf    WBC, UA 0-1 (A) None Seen, 0-5, 5-55, 5-65 /hpf    Epithelial Cells Occasional None Seen, Occasional /hpf    Bacteria, UA Occasional None Seen, Occasional /hpf

## 2018-05-01 NOTE — PATIENT INSTRUCTIONS
1  )  Low 2 g sodium diet    2 )  Monitor weights at home    3 )  Avoid NSAIDs    4 )  Monitor blood pressure at home, call if blood pressure greater than 150/90 persistently    5 )

## 2018-05-01 NOTE — PROGRESS NOTES
Results for orders placed or performed in visit on 05/01/18   Cardiac EP device report    Narrative    MDT Juan Pierson [facility]  BATTERY STATUS "GOOD"  NO DEVICE DETECTED EPISODES  1 PT ACTIVATED EPISODE DONE FOR DEMONSTRATION  PRESENTING RHYTHM NSR  NORMAL DEVICE FUNCTION  WOUND CHECK: INCISION CLEAN AND DRY WITH EDGES APPROXIMATED; WOUND CARE AND RESTRICTIONS REVIEWED WITH PATIENT   GV

## 2018-05-02 ENCOUNTER — OFFICE VISIT (OUTPATIENT)
Dept: OCCUPATIONAL THERAPY | Facility: CLINIC | Age: 33
End: 2018-05-02
Payer: COMMERCIAL

## 2018-05-02 ENCOUNTER — OFFICE VISIT (OUTPATIENT)
Dept: PHYSICAL THERAPY | Facility: CLINIC | Age: 33
End: 2018-05-02
Payer: COMMERCIAL

## 2018-05-02 DIAGNOSIS — I63.9 CEREBROVASCULAR ACCIDENT (CVA) OF LEFT PONTINE STRUCTURE (HCC): Primary | ICD-10-CM

## 2018-05-02 PROCEDURE — 97110 THERAPEUTIC EXERCISES: CPT

## 2018-05-02 PROCEDURE — 97112 NEUROMUSCULAR REEDUCATION: CPT

## 2018-05-02 PROCEDURE — 97535 SELF CARE MNGMENT TRAINING: CPT

## 2018-05-02 NOTE — PROGRESS NOTES
Daily Note     Today's date: 2018  Patient name: Lamin Gray  : 1985  MRN: [id number]  Referring provider: Jeni Nolasco DO  Dx:   Encounter Diagnosis   Name Primary?  Cerebrovascular accident (CVA) of left pontine structure ([address] ) Yes                  Subjective: "Yea, mornings are not the best time for me  I tend to do better in the afternoon but I had to make this appt in the morning it's the only time they had open"  Objective: See treatment diary below      Assessment: Tolerated treatment well  Patient would benefit from continued OT  Pt utilized eyepatch currently placed over right eye(affected eye) recommended by neuro opthalmologist for safety  Therapist reviewed reasons for eye patch concurring with OTR's reasonings from prior session  Pt understood and agreed stating he doesn't utilize the eye patch at home except for unsafe situations to decrease double vision  Pt engaged in standing task with head turns and vision occluded focusing on sustained convergence, visual scanning/perception and 2 column saccades duplicating pattern onto table to left from pattern placed on vertical plane to facilitate head turns  Min difficulty with spatial orientation  Pt requested to sit due to dizziness, therapist took BP LUE-170/60, /80(pt stated taking BP this AM in LUE, 160/80)  Pt completed task seated, also ate some M&M's due to low blood sugar  Pt stated feeling a little better post session  Plan: Continued skilled OT per POC with focus on ocular motor and V/P retraining

## 2018-05-02 NOTE — PROGRESS NOTES
Daily Note     Today's date: 2018  Patient name: Danilo Carrero  : 1985  MRN: [id number]  Referring provider: Kori Fisher DO  Dx:   Encounter Diagnosis     ICD-10-CM    1  Cerebrovascular accident (CVA) of left pontine structure ([address] ) I63 50        Start Time: 0800  Stop Time: 0900  Total time in clinic (min): 60 minutes    Subjective: Patient reports he went to his nephrologist and was informed that high blood sugar could lead to low blood pressure  Objective: See treatment diary below  Precautions: CVA with visual disturbances, CKD, DM type 1, HTN, falls     Daily Treatment Diary         Exercise Diary  18     Bike        Treadmill 2 0 with 1 UE  x10 min 2 2 mph, 1 UE, 10 min 2 3, 1 UE  10 min     FTEO         FAEC  FTEC on foam, 30''x3      Tandem gait // bars  Arms crossed, EO  3 laps 3 laps      Semi-tandem stance Foam, arms crossed, EC  4x30s Foam, EO, 30''x2 Foam, arms crossed, EC  30" x 2 ea     HK marches        STS feet staggered        Step taps from foam 8"  x30 alt heels  8" x 40,  Alt heels     Rockerboard taps M/L & A/P  1 min ea 20x ML EC, AP EO  20x ea     Heel/toe walking          cone taps        Step downs        fwd/bwd walking with HTs, H/V BWD with horzion HT  2x40', in [facility] [employer] in [place], 36' + 20' 2 laps 40'       step ups R/L Step-overs  6" & foam  2x10 ea Step-overs, 6'' & foam, 2x10 Step-overs, 6'' & foam, 2x15      knee to elbow marches with red MB         FT/EO perturbations         tandem stance        VORx1-seated-plain   30''x2 60" ea, eye patch donned     Saccades-seated-plain  60''x1 60" ea, eye patch donned     Foam twists, R/L and up/down Arms crossed, Eo, x1min  Arms crossed, EO, 1 min ea            Assessment: Patient presents with a right eye patch, donned throughout the session   Tolerated treatment fair with frequent LOB to the right, attempted same exercise without right eye patch and patient demonstrated less frequent LOB  Performed oculomotor exercises with eye patch donned secondary to elimination of diplopia  Patient would benefit from continued PT  Plan: Progress treatment as tolerated

## 2018-05-07 ENCOUNTER — OFFICE VISIT (OUTPATIENT)
Dept: PHYSICAL THERAPY | Facility: CLINIC | Age: 33
End: 2018-05-07
Payer: COMMERCIAL

## 2018-05-07 ENCOUNTER — APPOINTMENT (OUTPATIENT)
Dept: PHYSICAL THERAPY | Facility: CLINIC | Age: 33
End: 2018-05-07
Payer: COMMERCIAL

## 2018-05-07 ENCOUNTER — OFFICE VISIT (OUTPATIENT)
Dept: OCCUPATIONAL THERAPY | Facility: CLINIC | Age: 33
End: 2018-05-07
Payer: COMMERCIAL

## 2018-05-07 DIAGNOSIS — I63.9 CEREBROVASCULAR ACCIDENT (CVA) OF LEFT PONTINE STRUCTURE (HCC): Primary | ICD-10-CM

## 2018-05-07 PROCEDURE — 97535 SELF CARE MNGMENT TRAINING: CPT

## 2018-05-07 PROCEDURE — 97140 MANUAL THERAPY 1/> REGIONS: CPT

## 2018-05-07 PROCEDURE — 97112 NEUROMUSCULAR REEDUCATION: CPT

## 2018-05-07 PROCEDURE — 97110 THERAPEUTIC EXERCISES: CPT

## 2018-05-07 NOTE — PROGRESS NOTES
Daily Note     Today's date: 2018  Patient name: Bozena Jenkins  : 1985  MRN: [id number]  Referring provider: Mehnaz Pierre DO  Dx:   Encounter Diagnosis     ICD-10-CM    1  Cerebrovascular accident (CVA) of left pontine structure ([address] ) I63 50        Subjective: Patient reports glucose level low (73) prior to coming to therapy      Objective: See treatment diary below  Precautions: CVA with visual disturbances, CKD, DM type 1, HTN, falls     Daily Treatment Diary         Exercise Diary  18    Bike        Treadmill 2 0 with 1 UE  x10 min 2 2 mph, 1 UE, 10 min 2 3, 1 UE  10 min 2 2 mph, 1 UE, no UE    FTEO         FAEC  FTEC on foam, 30''x3  FTEC on foam, 30''x3    Tandem gait // bars  Arms crossed, EO  3 laps 3 laps  3 laps    Semi-tandem stance Foam, arms crossed, EC  4x30s Foam, EO, 30''x2 Foam, arms crossed, EC  30" x 2 ea Foam, EO, 30''x2    HK marches        STS feet staggered        Step taps from foam 8"  x30 alt heels  8" x 40,  Alt heels     Rockerboard taps M/L & A/P  1 min ea 20x ML EC, AP EO  20x ea ML EC, AP EO  20x ea    Heel/toe walking          cone taps        Step downs        fwd/bwd walking with HTs, H/V BWD with [employer] HT  2x40', in [place]       [employer] in [place], 36' + 20' 2 laps 40'       step ups R/L Step-overs  6" & foam  2x10 ea Step-overs, 6'' & foam, 2x10 Step-overs, 6'' & foam, 2x15 Step-overs, 6'' & foam, 2x10     knee to elbow marches with red MB         FT/EO perturbations    FA/EC, 30''     tandem stance        VORx1-seated-plain   30''x2 60" ea, eye patch donned 60" ea, eye patch donned    Saccades-seated-plain  60''x1 60" ea, eye patch donned 60" ea, eye patch donned    Sidestepping-foam beams    3 laps                    Foam twists, R/L and up/down Arms crossed, Eo, x1min  Arms crossed, EO, 1 min ea  Arms crossed, EO, 1 min ea, D 6/10          Assessment: Patient significantly challenged with EC M/L rockerboard taps, with frequent LOB, require assistance from therapist to prevent fall  Continues to be challenged with noted unsteadiness with both static and dynamic balance activities  Dizziness at worst 6/10  Plan: Progress treatment as tolerated

## 2018-05-07 NOTE — PROGRESS NOTES
Daily Note     Today's date: 2018  Patient name: Melani Shelley  : 1985  MRN: [id number]  Referring provider: Marylen Crofts, DO  Dx:   Encounter Diagnosis   Name Primary?  Cerebrovascular accident (CVA) of left pontine structure ([address] ) Yes                  Subjective: "My blood pressure was fine before I came here "      Objective: See treatment diary below      Assessment: Tolerated treatment well  Patient would benefit from continued OT Pt engaged in /VMI task focusing on binocularity w/sustained convergence using tongs hand to target, dowels->MR pegs->pom poms using mirror to gauge distance to target with mod difficulty  Peripheral field taping to B/L eyes focusing on sustained convergence while engaging in "Completion Word Worksheet" without difficulty  Plan: Continued skilled OT per POC with focus on ocularmotor, scanning, tracking, sustained convergence with and without vision occluded       INTERVENTION COMMENTS:  Diagnosis: CVA  Precautions: Acute Kidney injury, HTN, DM  Insurance: Payor: Martin Chan / Plan: Martin Chan / Product Type: Medicaid HMO /   15 of 58 [id number], PN due 2018

## 2018-05-08 ENCOUNTER — TRANSCRIBE ORDERS (OUTPATIENT)
Dept: LAB | Facility: CLINIC | Age: 33
End: 2018-05-08

## 2018-05-08 ENCOUNTER — APPOINTMENT (OUTPATIENT)
Dept: LAB | Facility: CLINIC | Age: 33
End: 2018-05-08
Payer: COMMERCIAL

## 2018-05-08 DIAGNOSIS — E87.5 HYPERKALEMIA: ICD-10-CM

## 2018-05-08 LAB
ANION GAP SERPL CALCULATED.3IONS-SCNC: 9 MMOL/L (ref 4–13)
BUN SERPL-MCNC: 50 MG/DL (ref 5–25)
CALCIUM SERPL-MCNC: 8.8 MG/DL (ref 8.3–10.1)
CHLORIDE SERPL-SCNC: 105 MMOL/L (ref 100–108)
CO2 SERPL-SCNC: 24 MMOL/L (ref 21–32)
CREAT SERPL-MCNC: 4.71 MG/DL (ref 0.6–1.3)
GFR SERPL CREATININE-BSD FRML MDRD: 15 ML/MIN/1.73SQ M
GLUCOSE P FAST SERPL-MCNC: 272 MG/DL (ref 65–99)
POTASSIUM SERPL-SCNC: 4.6 MMOL/L (ref 3.5–5.3)
SODIUM SERPL-SCNC: 138 MMOL/L (ref 136–145)

## 2018-05-08 PROCEDURE — 36415 COLL VENOUS BLD VENIPUNCTURE: CPT

## 2018-05-08 PROCEDURE — 80048 BASIC METABOLIC PNL TOTAL CA: CPT

## 2018-05-09 ENCOUNTER — OFFICE VISIT (OUTPATIENT)
Dept: PHYSICAL THERAPY | Facility: CLINIC | Age: 33
End: 2018-05-09
Payer: COMMERCIAL

## 2018-05-09 ENCOUNTER — OFFICE VISIT (OUTPATIENT)
Dept: OCCUPATIONAL THERAPY | Facility: CLINIC | Age: 33
End: 2018-05-09
Payer: COMMERCIAL

## 2018-05-09 DIAGNOSIS — I63.9 CEREBROVASCULAR ACCIDENT (CVA) OF LEFT PONTINE STRUCTURE (HCC): Primary | ICD-10-CM

## 2018-05-09 PROCEDURE — 97112 NEUROMUSCULAR REEDUCATION: CPT | Performed by: PHYSICAL THERAPIST

## 2018-05-09 PROCEDURE — 97535 SELF CARE MNGMENT TRAINING: CPT

## 2018-05-09 PROCEDURE — 97110 THERAPEUTIC EXERCISES: CPT | Performed by: PHYSICAL THERAPIST

## 2018-05-09 NOTE — PROGRESS NOTES
Daily Note     Today's date: 2018  Patient name: Terrence Galeana  : 1985  MRN: [id number]  Referring provider: Afsaneh Marques DO  Dx:   Encounter Diagnosis     ICD-10-CM    1  Cerebrovascular accident (CVA) of left pontine structure ([address] ) I63 50        Subjective: Pt reports having high blood sugar this morning and also feeling dehydrated as a result  Blood glucose 250 before coming to therapy today  Reports feeling no sx related to hyper/hypo glycemia       Objective: See treatment diary below  Precautions: CVA with visual disturbances, CKD, DM type 1, HTN, falls     Daily Treatment Diary         Exercise Diary  18   Bike        Treadmill 2 0 with 1 UE  x10 min 2 2 mph, 1 UE, 10 min 2 3, 1 UE  10 min 2 2 mph, 1 UE, no UE 2 2 mph, 1 UE   FTEO         FAEC  FTEC on foam, 30''x3  FTEC on foam, 30''x3 FTEC on foam, 30''x3   Tandem gait // bars  Arms crossed, EO  3 laps 3 laps  3 laps    Semi-tandem stance Foam, arms crossed, EC  4x30s Foam, EO, 30''x2 Foam, arms crossed, EC  30" x 2 ea Foam, EO, 30''x2 Foam, EO, 30''x3   HK marches        STS feet staggered        Step taps from foam 8"  x30 alt heels  8" x 40,  Alt heels     Rockerboard taps M/L & A/P  1 min ea 20x ML EC, AP EO  20x ea ML EC, AP EO  20x ea ML EC, AP EO  20x ea   Heel/toe walking          cone taps        Step downs        fwd/bwd walking with HTs, H/V BWD with horzion HT  2x40', in [place]       [employer] in [place], 36' + 20' 2 laps 36'   40' in //bars     step ups R/L Step-overs  6" & foam  2x10 ea Step-overs, 6'' & foam, 2x10 Step-overs, 6'' & foam, 2x15 Step-overs, 6'' & foam, 2x10 Step-overs, 6'' & foam, 2x10    knee to elbow marches with red MB         FT/EO perturbations    FA/EC, 30'' FA/EC, 30''x2    tandem stance        VORx1-seated-plain   30''x2 60" ea, eye patch donned 60" ea, eye patch donned Standing 60" L/R, up ea, eye patch donned   Saccades-seated-plain  60''x1 60" ea, eye patch donned 60" ea, eye patch donned Standing on foam, 60" L/R up/down, with eye patch   Sidestepping-foam beams    3 laps 6 laps 1 beam                   Foam twists, R/L and up/down Arms crossed, Eo, x1min  Arms crossed, EO, 1 min ea  Arms crossed, EO, 1 min ea, D 6/10 Arms crossed, EO, 1 min ea,         Assessment: Pt performed well today  Demonstrated postural sway and occasional need for UE support during balance exercises  Required short rest breaks in between exercises due to fatigue  Was able to tolerate progression of VOR and sacaddes to standing and showed some minor postural sway with small increase in dizziness  Would benefit form PT to address balance, dizziness, and mobility  Plan: Progress treatment as tolerated

## 2018-05-09 NOTE — PROGRESS NOTES
Daily Note     Today's date: 2018  Patient name: Bozena Jenkins  : 1985  MRN: [id number]  Referring provider: Claryce Castleman, DO  Dx:   Encounter Diagnosis   Name Primary?  Cerebrovascular accident (CVA) of left pontine structure ([facility] ) Yes                  Subjective: "My BP was pretty high this morning, but I don't feel dizzy "      Objective: See treatment diary below      Assessment: Tolerated treatment well  Patient arrived and declined for therapist to take BP stating he took it this morning and "it was high " Peripheral taping to B/L eyes for 15 minutes with no increase in symptoms  Noted with double vision and requested for patient to utilize personal glasses with patch  Patient reported that he felt his depth perception was off while completing task       Plan: Continued skilled OT per POC    INTERVENTION COMMENTS:  Diagnosis: Cerebrovascular accident (CVA) of left pontine structure ([facility] ) [I63 50]  Precautions: Acute Kidney injury, HTN, DM  FOTO:  16 of 24 visits, PN scheduled

## 2018-05-10 ENCOUNTER — OFFICE VISIT (OUTPATIENT)
Dept: INTERNAL MEDICINE CLINIC | Facility: CLINIC | Age: 33
End: 2018-05-10
Payer: COMMERCIAL

## 2018-05-10 VITALS
DIASTOLIC BLOOD PRESSURE: 82 MMHG | OXYGEN SATURATION: 98 % | WEIGHT: 213.6 LBS | TEMPERATURE: 98.2 F | BODY MASS INDEX: 29.9 KG/M2 | HEART RATE: 76 BPM | SYSTOLIC BLOOD PRESSURE: 156 MMHG | HEIGHT: 71 IN

## 2018-05-10 DIAGNOSIS — H51.0 BINOCULAR VISION DISORDER WITH CONJUGATE GAZE PALSY: Chronic | ICD-10-CM

## 2018-05-10 DIAGNOSIS — E10.21 TYPE 1 DIABETES MELLITUS WITH DIABETIC NEPHROPATHY, WITH LONG-TERM CURRENT USE OF INSULIN (HCC): Primary | Chronic | ICD-10-CM

## 2018-05-10 DIAGNOSIS — I15.0 RENOVASCULAR HYPERTENSION: ICD-10-CM

## 2018-05-10 PROCEDURE — 99214 OFFICE O/P EST MOD 30 MIN: CPT | Performed by: INTERNAL MEDICINE

## 2018-05-10 RX ORDER — MULTIVIT-MIN/IRON/FOLIC ACID/K 18-600-40
1 CAPSULE ORAL DAILY
COMMUNITY
End: 2020-10-26 | Stop reason: HOSPADM

## 2018-05-10 NOTE — PROGRESS NOTES
Assessment/Plan:    Type 1 diabetes mellitus with diabetic nephropathy, with long-term current use of insulin ([facility])  DM1 with nephropathy - BS are labile, he has been on Lantus 10units BID and self adjusting Humalog 5units TID  Next A1c due 6/2018  No ACEI due to CKD and hyperkalemia  Refer to Podiatry  He is scheduled to establish with Endo at [facility] 9/2018  Renovascular hypertension  Bp less labile on current regimen of chlorthalidone 50mg daily, hydralazine 100mg TID, labetalol 150mg in AM and 300mg in PM, nifedipine 30mg in AM 60mg in PM and torsemide 20mg daily  BMP monitored by Renal     Binocular visual disturbance  On observation by Neuro-ophthalmologist    1  Type 1 diabetes mellitus with diabetic nephropathy, with long-term current use of insulin ([facility] )  HEMOGLOBIN A1C W/ EAG ESTIMATION    Ambulatory referral to Podiatry   2  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,       3  Renovascular hypertension         Subjective:      Patient ID: Dominic Lewis is a 27 y o  male  HPI  28yo male with Dm1 with nephropathy, HLD, HTN, CKD IV with h/o cerebellar stroke and L pontine CVA here for follow up care  He is accompanied by his mother  If bp gets down to SBP 120s gets vision changes and dizziness  In AM gets higher bp up to sbp 180  Goal 150-160/80s  He is not able to see Endo at the clinic until 9/2018  FBS can be up to low 200s  Gets hypoglycemic events when doing PT, about once every 2 weeks  Seen by Neuro-opthalmologist in [facility], who is hopeful that his vision will be corrected, if not will need lenses or surgery       The following portions of the patient's history were reviewed and updated as appropriate: allergies, current medications, past family history, past medical history, past social history, past surgical history and problem list     Current Outpatient Prescriptions:     atorvastatin (LIPITOR) 40 mg tablet, Take 1 tablet (40 mg total) by mouth every evening, Disp: 30 tablet, Rfl: 5    B-D ULTRAFINE III SHORT PEN 31G X 8 MM MISC, Inject under the skin 4 (four) times a day, Disp: 400 each, Rfl: 5    chlorthalidone (HYGROTEN) 50 MG tablet, Take 1 tablet (50 mg total) by mouth daily, Disp: 30 tablet, Rfl: 0    Cholecalciferol (VITAMIN D) 2000 units CAPS, Take 1 capsule by mouth daily, Disp: , Rfl:     clopidogrel (PLAVIX) 75 mg tablet, Take 1 tablet (75 mg total) by mouth daily, Disp: 30 tablet, Rfl: 5    escitalopram (LEXAPRO) 10 mg tablet, Take 1 tablet (10 mg total) by mouth daily, Disp: 30 tablet, Rfl: 5    folic acid (FOLVITE) 1 mg tablet, Take 1 tablet (1 mg total) by mouth daily, Disp: 30 tablet, Rfl: 5    hydrALAZINE (APRESOLINE) 100 MG tablet, Take 1 tablet (100 mg total) by mouth 3 (three) times a day, Disp: 270 tablet, Rfl: 3    insulin glargine (LANTUS) 100 units/mL subcutaneous injection, Inject 8 Units under the skin daily with breakfast (Patient taking differently: Inject 10 Units under the skin 2 (two) times a day  ), Disp: 10 mL, Rfl: 0    insulin lispro (HumaLOG) 100 units/mL injection, Inject 5 Units under the skin 3 (three) times a day with meals U-100 pen, Disp: 10 mL, Rfl: 2    labetalol (NORMODYNE) 300 mg tablet, Take 0 5 tablets (150 mg total) by mouth every 8 (eight) hours (Patient taking differently: Take 150 mg by mouth every 8 (eight) hours Take 150 mg  In AM, 150 mg  At 2:00 PM and 300 mg  At 10:00 PM ), Disp: 60 tablet, Rfl: 3    NIFEdipine ER (ADALAT CC) 60 MG 24 hr tablet, Take 1 tablet (60 mg total) by mouth every evening (Patient taking differently: Take 60 mg by mouth every evening Take 30 mg   In AM, take 60 mg  @ 4PM ), Disp: 180 tablet, Rfl: 3    ondansetron (ZOFRAN) 4 mg tablet, Take 1 tablet (4 mg total) by mouth every 8 (eight) hours as needed for nausea or vomiting, Disp: 20 tablet, Rfl: 0    sodium bicarbonate 650 mg tablet, Take 1 tablet (650 mg total) by mouth 4 (four) times a day, Disp: 120 tablet, Rfl: 6    torsemide (DEMADEX) 20 mg tablet, Take 1 tablet (20 mg total) by mouth every other day (Patient taking differently: Take 10 mg by mouth daily 1/2 tablet daily ), Disp: 90 tablet, Rfl: 3    Review of Systems   Constitutional: Negative  HENT: Negative  Eyes: Positive for visual disturbance  Respiratory: Negative  Cardiovascular: Negative  Genitourinary: Negative  Neurological: Positive for dizziness  Negative for headaches  Objective:    /82 (BP Location: Right arm, Patient Position: Sitting, Cuff Size: Adult)   Pulse 76   Temp 98 2 °F (36 8 °C) (Tympanic)   Ht 5' 11" (1 803 m)   Wt 96 9 kg (213 lb 9 6 oz)   SpO2 98%   BMI 29 79 kg/m²      Physical Exam   Constitutional: He is oriented to person, place, and time  He appears well-developed and well-nourished  HENT:   Mouth/Throat: Oropharynx is clear and moist    Eyes:   Wears eye cover   Cardiovascular: Normal rate, regular rhythm and normal heart sounds  Pulmonary/Chest: Effort normal and breath sounds normal    Neurological: He is alert and oriented to person, place, and time  Rhomberg improved   Vitals reviewed

## 2018-05-10 NOTE — ASSESSMENT & PLAN NOTE
Bp less labile on current regimen of chlorthalidone 50mg daily, hydralazine 100mg TID, labetalol 150mg in AM and 300mg in PM, nifedipine 30mg in AM 60mg in PM and torsemide 20mg daily    BMP monitored by Renal

## 2018-05-10 NOTE — ASSESSMENT & PLAN NOTE
DM1 with nephropathy - BS are labile, he has been on Lantus 10units BID and self adjusting Humalog 5units TID  Next A1c due 6/2018  No ACEI due to CKD and hyperkalemia  Refer to Podiatry  He is scheduled to establish with Real at [facility] 9/2018

## 2018-05-11 NOTE — PROGRESS NOTES
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,      Binocular visual disturbance    Vitamin D deficiency    Hyperhomocysteinemia (HCC)    Hyperkalemia    Chronic kidney disease, stage 4 (severe) (HCC)    Persistent proteinuria    Bilateral leg edema    Ataxia    Esophagitis    Gastritis    Left atrial dilation    Renovascular hypertension    Anemia in stage 4 chronic kidney disease (HCC)     Past Medical Hx:   Past Medical History:   Diagnosis Date    Acute kidney injury ([facility] )     Cerebellar stroke side undetermined 2015    Diabetes type 1, controlled ([facility] )     Hypertension      Past Surgical Hx:   Past Surgical History:   Procedure Laterality Date    TONSILLECTOMY        Pain Levels:   Restin    With Activity:  0    Subjective/Patient Goal: "To get my eyes working better"       History of Present Illness:  Pt is a 27 y  o  male who was referred to Occupational Therapy s/p CVA   Pt has had two hospitalizations within the last month  Hipolito Swartz was hospitalized with acute CVA (Also had a previous lacunar infarct), LLUVIA on CKD, hypertensive urgency, 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   Due to concerns for for demyelinating disorder patient underwent a course of plasmapheresis urgency -discharged on 2018  He was readmitted on 2/10/18 with hypertensive urgency  Pt has a h/o diabetes mellitus type 1, CKD, CVA, hyperhomocystinemia who was  Previously managed by Dr Juan David Crowe but lost to follow-up  The first CVA, which was July 2015, 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  Dayna Amador was found to be 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 and also heterozygote for prothrombin gene mutation  He was scheduled for subsequent follow-up with hematology oncology but did not follow-up  He also continued smoking cigarettes   In review of previous labs it appears that creatinine baseline was between 2-2 4 as far back as 2015 up until the summer of 2017   During hospitalization from 1/22/18-2/5/18 creatinine peaked at 8 3 and was down to 3 39 at discharge   When patient was readmitted on 2/10/18 creatinine was 4 03 and was down to 3 66 at discharge   Follow-up labs show a creatinine of 4 mg/dL  Dayna Amador underwent significant adjustment of antihypertensives during second hospital visit  [facility] was discharged on labetalol, chlorthalidone, hydralazine, nifedipine and torsemide, ultimately dx'd w/ subacute posterior velia CVA, MRIB confirms same, comorbidities as listed above      Lifestyle Performance Model:  Autonomy: Pt was I w/ I/ADLS, drove, & required no use of DME PTA though has not been driving since recent CVA 1/2018, parents assist w/ driving as needed  Reciprocal Relationships: Supportive parents pt moved in w/ s/p recent CVA, pt was living alone prior to CVA, single  Service to Others: Pt is unemployed currently, was working as a  for [employer]  Intrinsic Gratification: Enjoys woodworking and listening to classic rock music  Home Setup: Pt lives w/ parents in a [facility] w/ 2nd floor bedroom/bathroom w/ first floor 1/2 bathroom w/ "few" NIRU      Objective  Impairments Section:   UE Strength:   ANGEL: RUE: 120/200 LUE: 115/200   PINCER: 3 point pinch: RUE 21, LUE:21   2 point pinch: RUE:  20, LUE:20   Lateral pincher: RUE: 23, LUE: 22    Coordination:   9 HOLE PEG TEST:     RUE: 30 6 seconds, LUE: 29 0 Seconds    Range of Motion:  AROM:              Full t/o B/L UE WFL/WNL     PROM:              Full t/o B/L UE WFL/WNL    Sensation:  MYOFILAMENTS:              RUE: WNL 3 61              LUE: WNL 3 61    Visual Perceptual and Functional Cognition:  1  Convergence Insufficiency Symptom Survey (CISS): 14/60 PASS    2  Concussion Cognitive Checklist: self report symptom checklist  *Patient indicated that he is experiencing the following symptoms:    · Memory: Remembering your schedule    · Attention: Dividing your attention (i e , multi-tasking)    · Processing: None reported    · Executive Functions: None reported    · Communication: None reported    · Visual: Losing spot on the page when reading    · Emotional: Frustration tolerance    · Increased Sensitivities to: Noise     3  Contextual Memory Test (CMT): Deferred 2* scored WNL/WFL on MoCA on I E  On 2/27/2018  ;:  4  Song Cognitive Assessment Version: (Deferred 2* scored 28/30 on I E  On 2/27/2018 indicative of WNL/WFL)    5  Vision Screening Recording Form:   vision screen: + glasses for distance only, initially for near acuity, pt reports since CVA glasses have been ineffective and worse for near acuity  near acuity: R 20/70, L 20/50  binocularity far: orthophoria  binocularity near: orthophoria  red green fusion: PLRG @ 5"  near point of convergence: diplopia @ 12"  perlita string: divergent insufficiency w/ suppression of far  pursuits: slightly jerky  saccades: inaccurate in all planes, able to initiate crossing from midline to far L VF, L eye 2* conjugate gaze w/ ocular palsy  ocular ROM: intact/full  visual perceptual midline shift: shifted to the L of midline and @ horizon    6  Anxiety/Depression:  Pt engaged in the Neuro QOL Anxiety Short Form and Neuro QOL Depression Short Form in order to screen for anxiety and depressive s/s  Pt reported the following:  Anxiety- Short Form:   --used to measure anxious s/s   For scoring purposes, scores of 25+ indicate the threshold for treatment per neurology/SLIM  Score:12/40  Pt most often chose the response rarely when asked about feeling anxious s/s  Depression- Short Form:   --used to measure depressive s/s  For scoring purposes, scores of 25+ indicate the threshold for treatment per neurology/SLIM  Score:16/40  Pt most often chose the response rarely when asked about feeling depressive s/s  Assessment/Plan  Occupational Therapy Skilled Analysis Assessment and Plan of Care:  Pt requires overall mod I for ADLs/self care and mod I for fx'l mobility w/o DME  Pt is currently demonstrating the following occupational deficits: limited 2* diplopia @ 12", PLRG @ 3", resting L esophoria, decreased teaming L eye, L eye does not cross midline from midline to far L VF, jerky pursuits in all planes, dysmetric saccades, L conjugate gaze ocular palsy, binocular vision impairments, shifted below horizon w/ L head tilt persistent  The following Occupational Performance Areas to address include: medication management, socialization, health maintenance, cleaning, meal prep, money management, household maintenance, care of children, care of pets, job performance/volunteering and social participation  Pt reports contacted neuro opthamologist Dr Mary Moseley though did not accept his insurance, scheduled w/ neuro opthamologist Bryan Perdomo in [place] this past month, recommended eye patching, has been wearing w/ subjective reports of reduction in diplopia and dizziness  At this point, pt has functionally plateaued, discussed vision therapy and recommendation for same w/ follow up w/ neuro optometrist  Discussed areas w/ pt and recommendations, pt agreeable, requested resources in the [facility], pt given written handouts w/ phone numbers, addresses, and vision therapy program information for Dr Trenton Olvera and [facility] as well as [employer], pt acknowledged reception of such   Pt has reached ceiling level from a /VM standpoint w/ OT, recommend vision therapy  No further skillable outpt OT needs indicated at this time  D/C from caseload, D/C OT  Pt agreeable      Goals:  Short Term Goals:  - Pt will increase oculomotor control for improved saccades, con/divergent tasks for improved reading, board to table tasks with minimal increase in symptoms 4 weeks-PARTIALLY MET  - Pt will increased L eye pursuits and ROM to L visual field x 50% for improved binocularity x 4 weeks  -PARTIALLY MET     Long Term Goals:  -Pt will increase oculomotor control for improved dynamic activities with head turns, board/screen to table tasks symptom free, improved VMI and return to baseline handwriting-PARTIALLY MET  - Pt will increase oculomotor control for WNL saccades, con/divergent tasks symptom free for improved QOL-PARTIALLY MET  -Pt will increased L eye pursuits and ROM to L visual field x 75% for improved binocularity-PARTIALLY MET     INTERVENTION COMMENTS:  Diagnosis: Posterior Daisy CVA  Precautions: LLUVIA, HTN, DM I  Foto: 64 with 36% limitation  Insurance: Payor: Vincent Charter / Plan: Vincent Charter / Product Type: Medicaid HMO /   17 of 24 visits, PN due 6/14/2018     Thank you for the consult!   Please call if you have any questions: [phone]  Avtar Temple, OTCRISTIAN, OTR/L, C-HAKEEM, CSRS  Director of Outpatient Neuro Occupational Therapy

## 2018-05-14 ENCOUNTER — EVALUATION (OUTPATIENT)
Dept: OCCUPATIONAL THERAPY | Facility: CLINIC | Age: 33
End: 2018-05-14
Payer: COMMERCIAL

## 2018-05-14 ENCOUNTER — OFFICE VISIT (OUTPATIENT)
Dept: PHYSICAL THERAPY | Facility: CLINIC | Age: 33
End: 2018-05-14
Payer: COMMERCIAL

## 2018-05-14 DIAGNOSIS — I63.9 CEREBROVASCULAR ACCIDENT (CVA) OF LEFT PONTINE STRUCTURE (HCC): Chronic | ICD-10-CM

## 2018-05-14 DIAGNOSIS — I63.9 CEREBROVASCULAR ACCIDENT (CVA) OF LEFT PONTINE STRUCTURE (HCC): Primary | ICD-10-CM

## 2018-05-14 PROCEDURE — G8992 OTHER PT/OT  D/C STATUS: HCPCS

## 2018-05-14 PROCEDURE — 97110 THERAPEUTIC EXERCISES: CPT

## 2018-05-14 PROCEDURE — G8991 OTHER PT/OT GOAL STATUS: HCPCS

## 2018-05-14 PROCEDURE — G8990 OTHER PT/OT CURRENT STATUS: HCPCS

## 2018-05-14 PROCEDURE — 97530 THERAPEUTIC ACTIVITIES: CPT

## 2018-05-14 PROCEDURE — 97112 NEUROMUSCULAR REEDUCATION: CPT

## 2018-05-14 NOTE — PROGRESS NOTES
Daily Note     Today's date: 2018  Patient name: Lamin Gray  : 1985  MRN: [id number]  Referring provider: Nelson Diaz DO  Dx:   No diagnosis found  Subjective: No new complaint  No falls or pain reported  Arrived with eye patch over right eye  Objective: See treatment diary below  PT tactile and verbal cues for exercise technique and progression  All ex's challenging  Denies complaint at end of session    Precautions: CVA with visual disturbances, CKD, DM type 1, HTN, falls     Daily Treatment Diary         Exercise Diary  18   Bike         Treadmill 2 0 with 1 UE  x10 min 2 2 mph, 1 UE, 10 min 2 3, 1 UE  10 min 2 2 mph, 1 UE, no UE 2 2 mph, 1 UE 12 min 2 2 MPH, 1 UE   FTEO       FTEOHT 10x2, 6 LOB, use of VF   FAEC  FTEC on foam, 30''x3  FTEC on foam, 30''x3 FTEC on foam, 30''x3 FTEC 10"x5, 4 LOB; FAEC 10"x5, 0 LOB;    VOR Cx w/ ball on foam FA 10x horiz/ vert each, 3 LOB   Tandem gait // bars  Arms crossed, EO  3 laps 3 laps  3 laps  1 UE F/B in parallel bars 1 UE x 3   Semi-tandem stance Foam, arms crossed, EC  4x30s Foam, EO, 30''x2 Foam, arms crossed, EC  30" x 2 ea Foam, EO, 30''x2 Foam, EO, 30''x3    HK marches         STS feet staggered         Step taps from foam 8"  x30 alt heels  8" x 40,  Alt heels      Rockerboard taps M/L & A/P  1 min ea 20x ML EC, AP EO  20x ea ML EC, AP EO  20x ea ML EC, AP EO  20x ea    Heel/toe walking           cone taps      2x10 each, change speed   Step downs         fwd/bwd walking with HTs, H/V BWD with horzion HT  2x40', in [facility] all direcetions, change speed, start/ stop x 4 minutes    lunges Walking in gonzalez, 40' + 20' 2 laps [address]   40' in //bars      step ups R/L Step-overs  6" & foam  2x10 ea Step-overs, 6'' & foam, 2x10 Step-overs, 6'' & foam, 2x15 Step-overs, 6'' & foam, 2x10 Step-overs, 6'' & foam, 2x10     knee to elbow marches with red MB          FT/EO perturbations    FA/EC, 30'' FA/EC, 30''x2  tandem stance         VORx1-seated-plain   30''x2 60" ea, eye patch donned 60" ea, eye patch donned Standing 60" L/R, up ea, eye patch donned Standing 90 seconds each direction, no LOB- issued for HEP   Saccades-seated-plain  60''x1 60" ea, eye patch donned 60" ea, eye patch donned Standing on foam, 60" L/R up/down, with eye patch    Sidestepping-foam beams    3 laps 6 laps 1 beam Braiding firm, 2 min- ataxic   1/4 squats EO/EC      EO 15x, EC 10x, no LOB   Ball Toss FA      Static x 2min; 2 LOB   Foam twists, R/L and up/down Arms crossed, Eo, x1min  Arms crossed, EO, 1 min ea  Arms crossed, EO, 1 min ea, D 6/10 Arms crossed, EO, 1 min ea,          Assessment: Pt performed well today  Note slight LE ataxia w/ coordination ex's  Educated on use of visual fixation for orientation/ stability w/ balance ex's and use of somatosensory input- patient demo understanding  X1 view issued for HEP 2x/day for 90 seconds each direction  Patient voiced and demo understanding  Plan: Continue per plan of care  Progress treatment as tolerated

## 2018-05-16 ENCOUNTER — APPOINTMENT (OUTPATIENT)
Dept: OCCUPATIONAL THERAPY | Facility: CLINIC | Age: 33
End: 2018-05-16
Payer: COMMERCIAL

## 2018-05-16 ENCOUNTER — OFFICE VISIT (OUTPATIENT)
Dept: PHYSICAL THERAPY | Facility: CLINIC | Age: 33
End: 2018-05-16
Payer: COMMERCIAL

## 2018-05-16 DIAGNOSIS — I63.9 CEREBROVASCULAR ACCIDENT (CVA) OF LEFT PONTINE STRUCTURE (HCC): Primary | ICD-10-CM

## 2018-05-16 PROCEDURE — 97110 THERAPEUTIC EXERCISES: CPT | Performed by: PHYSICAL THERAPIST

## 2018-05-16 PROCEDURE — 97112 NEUROMUSCULAR REEDUCATION: CPT | Performed by: PHYSICAL THERAPIST

## 2018-05-16 NOTE — PROGRESS NOTES
Daily Note     Today's date: 2018  Patient name: Melani Shelley  : 1985  MRN: [id number]  Referring provider: Olimpia Robertson DO  Dx:   Encounter Diagnosis     ICD-10-CM    1  Cerebrovascular accident (CVA) of left pontine structure ([address] ) [address] 50        Subjective: NO new changes this session, has an eye patch over eye    Objective: See treatment diary below  Precautions: CVA with visual disturbances, CKD, DM type 1, HTN, falls     Daily Treatment Diary         Exercise Diary          Bike         Treadmill 2 4 mph one UE        FTEO          FAEC         Tandem gait 3 laps no ue        Semi-tandem stance         HK marches         STS  On foam 3x10        Step taps from foam         Rockerboard taps 30x with HT/hn        Heel/toe walking           cone taps         Step downs         fwd/bwd walking with HTs, H/V          lunges 3 laps         step ups R/L With foam 15x ea         knee to elbow marches with red MB          FT/EO perturbations          tandem stance         VORx1-seated-plain          Saccades-seated-plain         Sidestepping-foam beams 3 laps        1/4 squats EO/EC         Ball Toss FA         Foam twists, R/L and up/down 10x              Assessment: Pt continues to demonstrate ataxia with coordination exercise  HE demonstrates improvement with strengthening exercise with improved endurance  Plan to continue with coordination and balance activities        Plan: Continue per plan of care  Progress treatment as tolerated

## 2018-05-17 ENCOUNTER — TELEPHONE (OUTPATIENT)
Dept: NEUROLOGY | Facility: CLINIC | Age: 33
End: 2018-05-17

## 2018-05-21 ENCOUNTER — APPOINTMENT (OUTPATIENT)
Dept: OCCUPATIONAL THERAPY | Facility: CLINIC | Age: 33
End: 2018-05-21
Payer: COMMERCIAL

## 2018-05-21 ENCOUNTER — HOSPITAL ENCOUNTER (OUTPATIENT)
Dept: MRI IMAGING | Facility: HOSPITAL | Age: 33
Discharge: HOME | End: 2018-05-21
Payer: COMMERCIAL

## 2018-05-21 ENCOUNTER — OFFICE VISIT (OUTPATIENT)
Dept: PHYSICAL THERAPY | Facility: CLINIC | Age: 33
End: 2018-05-21
Payer: COMMERCIAL

## 2018-05-21 DIAGNOSIS — I63.9 CEREBROVASCULAR ACCIDENT (CVA) OF LEFT PONTINE STRUCTURE (HCC): ICD-10-CM

## 2018-05-21 DIAGNOSIS — I63.9 CEREBROVASCULAR ACCIDENT (CVA) OF LEFT PONTINE STRUCTURE (HCC): Primary | ICD-10-CM

## 2018-05-21 PROCEDURE — 70551 MRI BRAIN STEM W/O DYE: CPT

## 2018-05-21 PROCEDURE — 97112 NEUROMUSCULAR REEDUCATION: CPT

## 2018-05-21 PROCEDURE — 97110 THERAPEUTIC EXERCISES: CPT

## 2018-05-21 NOTE — PROGRESS NOTES
Daily Note     Today's date: 2018  Patient name: Sonya Poon  : 1985  MRN: [id number]  Referring provider: Doreen Toledo DO  Dx:   Encounter Diagnosis     ICD-10-CM    1  Cerebrovascular accident (CVA) of left pontine structure ([address] ) [address] 50        Start Time: 1000  Stop Time: 1100  Total time in clinic (min): 60 minutes    Subjective: Patient reports no new changes since last session  Objective: See treatment diary below  Precautions: CVA with visual disturbances, CKD, DM type 1, HTN, falls     Daily Treatment Diary         Exercise Diary         Bike         Treadmill 2 4 mph one UE 2 4  Mph, 3% incline, 1 UE  10 min        FTEO          FTEC foam  30" x 3       Tandem gait 3 laps no ue 3 laps, no UE       Semi-tandem stance         HK marches         STS  On foam 3x10 On foam, EC  2 x 15       Step taps from foam         Rockerboard taps 30x with HT/hn 20x ea with HT/HN       VOR Cx w/ ball on foam FA 10x horiz/ vert each   60" ea         cone taps         Step downs         fwd/bwd walking with HTs, H/V  Hallway, HT all directions, 40'x4    Change speed bwd 40' x 2         lunges 3 laps         step ups R/L With foam 15x ea         knee to elbow marches with red MB          FT/EO perturbations          tandem stance         VORx1  seated-busy  30" ea       Saccades  seated-busy  30" ea       Sidestepping-foam beams 3 laps Braiding firm, 3 laps       mini squats EO/EC         Ball Toss FA         Foam twists, R/L and up/down 10x 10x ea            Assessment: Tolerated treatment well with mild exacerbation of dizziness following exercises on uneven surfaces  Patient demonstrates improved balance this session with feet shoulder width apart, increased difficulty with narrow COLBY  Patient also able to progress VOR exercises to busy background with mild difficulty maintaining focus on the target   Patient demonstrated fatigue post treatment and would benefit from continued PT       Plan: Progress treatment as tolerated

## 2018-05-23 ENCOUNTER — OFFICE VISIT (OUTPATIENT)
Dept: PHYSICAL THERAPY | Facility: CLINIC | Age: 33
End: 2018-05-23
Payer: COMMERCIAL

## 2018-05-23 ENCOUNTER — APPOINTMENT (OUTPATIENT)
Dept: OCCUPATIONAL THERAPY | Facility: CLINIC | Age: 33
End: 2018-05-23
Payer: COMMERCIAL

## 2018-05-23 DIAGNOSIS — I63.9 CEREBROVASCULAR ACCIDENT (CVA) OF LEFT PONTINE STRUCTURE (HCC): Primary | ICD-10-CM

## 2018-05-23 PROCEDURE — 97112 NEUROMUSCULAR REEDUCATION: CPT

## 2018-05-23 PROCEDURE — 97110 THERAPEUTIC EXERCISES: CPT

## 2018-05-23 NOTE — PROGRESS NOTES
Daily Note     Today's date: 2018  Patient name: Alia Waller  : 1985  MRN: [id number]  Referring provider: Alex Yang DO  Dx:   Encounter Diagnosis     ICD-10-CM    1  Cerebrovascular accident (CVA) of left pontine structure ([address] ) [address] 50           Subjective: Patient reports no new changes since last session  Objective: See treatment diary below  Precautions: CVA with visual disturbances, CKD, DM type 1, HTN, falls     Daily Treatment Diary         Exercise Diary        Bike         Treadmill 2 4 mph one UE 2 4  Mph, 3% incline, 1 UE  10 min  2 3 mph, 4% incline, 10 min      FTEO          FTEC foam  30" x 3 30''x3      Tandem gait 3 laps no ue 3 laps, no UE 3 laps      Semi-tandem stance   Foam, 30''x3      HK marches         STS  On foam 3x10 On foam, EC  2 x 15 On foam, EC, 2x15      Step taps from foam         Rockerboard taps 30x with HT/hn 20x ea with HT/HN 20x      VOR Cx w/ ball on foam FA 10x horiz/ vert each   60" ea         cone taps         Step downs         fwd/bwd walking with HTs, H/V  Hallway, HT all directions, 40'x4    Change speed bwd 40' x 2         lunges 3 laps         step ups R/L With foam 15x ea         knee to elbow marches with red MB          FT/EO perturbations          tandem stance         VORx1  seated-busy  30" ea seated-busy  30" eax2      Saccades  seated-busy  30" ea seated-busy  30" eax2      Sidestepping-foam beams 3 laps Braiding firm, 3 laps Braiding firm, 3 laps      mini squats EO/EC         Ball Toss FA         Foam twists, R/L and up/down 10x 10x ea  EC, 30''x2          Assessment: Moderately increased dizziness with some exercises  Patient continues to demonstrate impaired balance with noted instability and sway with proprioceptive training, but improving  Reports increased dizziness with VOR, but states being able to keep target in better focus  Continues to fatigue easily, given frequent seated breaks        Plan: Progress treatment as tolerated

## 2018-05-25 ENCOUNTER — OFFICE VISIT (OUTPATIENT)
Dept: NEUROLOGY | Facility: CLINIC | Age: 33
End: 2018-05-25
Payer: COMMERCIAL

## 2018-05-25 VITALS
HEIGHT: 71 IN | WEIGHT: 213 LBS | DIASTOLIC BLOOD PRESSURE: 95 MMHG | HEART RATE: 83 BPM | BODY MASS INDEX: 29.82 KG/M2 | SYSTOLIC BLOOD PRESSURE: 195 MMHG

## 2018-05-25 DIAGNOSIS — I16.0 HYPERTENSIVE URGENCY: ICD-10-CM

## 2018-05-25 DIAGNOSIS — I63.9 CEREBROVASCULAR ACCIDENT (CVA) OF LEFT PONTINE STRUCTURE (HCC): Chronic | ICD-10-CM

## 2018-05-25 DIAGNOSIS — G37.9 CNS DEMYELINATING DISEASE (HCC): Primary | ICD-10-CM

## 2018-05-25 DIAGNOSIS — I51.7 LEFT ATRIAL DILATION: ICD-10-CM

## 2018-05-25 PROCEDURE — 99215 OFFICE O/P EST HI 40 MIN: CPT | Performed by: PSYCHIATRY & NEUROLOGY

## 2018-05-25 NOTE — PROGRESS NOTES
Patient ID: Lashanda Hector is a 27 y o  male  Assessment/Plan:     Problem List Items Addressed This Visit        Cardiovascular and Mediastinum    Cerebrovascular accident (CVA) of left pontine structure ([address] ) (Chronic)    Hypertensive urgency    Left atrial dilation      Other Visit Diagnoses     CNS demyelinating disease ([facility] [address] )    -  Primary    Relevant Orders    NMO IgG Autoantibodies    Fatty acids, long chain         Mr  Lukasz Walters has presented for follow up on multiple hypertensive events with CVA and double vision  He described significant improvement in double vision at this point  MRI brain was completed in May 2018 and he is here today with his parents to discuss his findings  Patient has extensive white matter changes in subinsular area and thalamus along with posterior fossa lesions with progression of white mater has been noted  Agree with corpus callosum involvement  I do appreciate several lesions resemble MS like lesions, and corpus callosum involvement is also noted, but unfortunately there are multiple mimickers which can resemble MS, including CADASIL  Will be glad to help with genetic testing if clinically indicated by Stroke team     Patient was asked to add aspirin 81 mg to his regimen with Plavix for 1 more month only- CTA head/neck was completely unremarkable  No acute stroke appreciated on his recent MRI in may 2018  No intractable migraines despite BP is >190's today  Patient has hyperdense cerebellar nuclei and basal ganglia area- pantothenate kinase deficiency might be of mild concern, but not classic on radiographic presentation  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 with arylsulfatase A evaluation  No new focal neurologic deficit  Neuro-ophthalmology follow up recommended - he was asked to bring OCT study to comment on optic nerve integrity  MS panel was negative, MRI C/T showed no demyelination     Vitamin D 2 50,000 IU weekly might be considered as level is 10 9  Follow up with stroke team in 3 months  HPI/History of Present Illness    Mr  Sarai Sam is a 28 yo with CVA 2015 and 2018, type I DM, stage 3, HTN to 200's, several hospitalizations for Hypertensive urgency, homozygous for MTHFR and heterozygous for prothrombin gene, and now in transplant list for CKD, who presented for evaluation of abnormal   2015 was cerebellar stroke with vertigo on and off described, with 95 % improvement  In January 2018 he had pontine CVA and his symptoms were left lateral gaze OS, double vision, blurriness  Since that time he quite smoking, evaluated by hematologist and nephrologist    Now HTN is better controlled, but goal is 150/85 mmHg  Hematology recommended to continue Plavix only  Lipitor was continued  Patient has loop recorder placed  Left atrial dilaltion noted  No arrhythmias, SANJEEV was normal      Neuro-ophthalmology at [place] recommended patch with surgery will be considered only if no improvement  Patient is not following with endocrinologist  Patient has improvement by 65% to his baseline  Difficulties with ADLs and depth perception  March 30 he had nausea, room spinning and vomiting with worsening of vision since that time  No fevers described  No family history, stroke in old age  MRI brain May 2018 : On the previous study a small focus of diffusion restriction was noted in the velia in the region of the facial colliculus   There has been normalization of the diffusion restriction in this area and this lesion appears now as a T2   hyperintense focus  T2 hyperintense foci are noted in the bilateral middle cerebellar peduncle and in the inferior cerebellar peduncle, unchanged  T2 hyperintense focus noted in the right inferior cerebellar peduncle and right medulla, new from the previous study   There are scattered white matter T2 hyperintense foci in the periventricular region    These are unchanged from the previous study  Juxtacortical T2 hyperintense foci are noted in the left frontal region, not seen on the previous study are seen in image 17 of series 5  T2 hyperintense foci with the hyperintense rim on the FLAIR sequences in the left thalamus are unchanged  On the previous study lesion was seen in the right subinsular region, seen in image 11 of series 7  This is not identified on the current study  The following portions of the patient's history were reviewed and updated as appropriate:   He  has a past medical history of Acute kidney injury ([facility] [address] ); Cerebellar stroke side undetermined 2015 (2013); Diabetes type 1, controlled ([address] ); and Hypertension  He   Patient Active Problem List    Diagnosis Date Noted    Anemia in stage 4 chronic kidney disease ([facility] [address] ) 05/01/2018    Renovascular hypertension 03/26/2018    Left atrial dilation 02/27/2018    Bilateral leg edema 02/19/2018    Chronic kidney disease, stage 4 (severe) ([facility] [address] ) 02/11/2018    Persistent proteinuria 02/11/2018    Hyperkalemia 02/10/2018    Hyperhomocysteinemia ([facility] [address] ) 02/05/2018    Vitamin D deficiency 01/29/2018    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,   01/28/2018     Class: Acute    Binocular visual disturbance 01/28/2018    History of lacunar cerebrovascular accident (CVA) 01/22/2018    Type 1 diabetes mellitus with diabetic nephropathy, with long-term current use of insulin ([facility] [address] ) 06/19/2017    Cerebrovascular accident (CVA) of left pontine structure ([facility] [address] ) 07/21/2015     Class: Acute    Hypertensive urgency 07/21/2015    Ataxia 07/21/2015    Esophagitis 07/21/2015    Gastritis 07/21/2015     He  has a past surgical history that includes Tonsillectomy    His family history includes Breast cancer in his mother; Diabetes in his paternal grandfather; Heart disease in his paternal grandfather; Hyperlipidemia in his father, maternal grandfather, and paternal grandmother; Hypertension in his father, maternal grandfather, mother, and paternal grandmother; Leukemia in his maternal grandmother  He  reports that he has quit smoking  His smoking use included Cigarettes  He has a 6 00 pack-year smoking history  He has never used smokeless tobacco  He reports that he drinks alcohol  He reports that he uses drugs, including Marijuana, about 3 times per week  Current Outpatient Prescriptions   Medication Sig Dispense Refill    atorvastatin (LIPITOR) 40 mg tablet Take 1 tablet (40 mg total) by mouth every evening 30 tablet 5    chlorthalidone (HYGROTEN) 50 MG tablet Take 1 tablet (50 mg total) by mouth daily 30 tablet 0    Cholecalciferol (VITAMIN D) 2000 units CAPS Take 1 capsule by mouth daily      clopidogrel (PLAVIX) 75 mg tablet Take 1 tablet (75 mg total) by mouth daily 30 tablet 5    escitalopram (LEXAPRO) 10 mg tablet Take 1 tablet (10 mg total) by mouth daily 30 tablet 5    folic acid (FOLVITE) 1 mg tablet Take 1 tablet (1 mg total) by mouth daily 30 tablet 5    hydrALAZINE (APRESOLINE) 100 MG tablet Take 1 tablet (100 mg total) by mouth 3 (three) times a day 270 tablet 3    insulin glargine (LANTUS) 100 units/mL subcutaneous injection Inject 8 Units under the skin daily with breakfast (Patient taking differently: Inject 10 Units under the skin 2 (two) times a day  ) 10 mL 0    insulin lispro (HumaLOG) 100 units/mL injection Inject 5 Units under the skin 3 (three) times a day with meals U-100 pen 10 mL 2    labetalol (NORMODYNE) 300 mg tablet Take 0 5 tablets (150 mg total) by mouth every 8 (eight) hours (Patient taking differently: Take 150 mg by mouth every 8 (eight) hours Take 150 mg  In AM, 150 mg  At 2:00 PM and 300 mg  At 10:00 PM ) 60 tablet 3    NIFEdipine ER (ADALAT CC) 60 MG 24 hr tablet Take 1 tablet (60 mg total) by mouth every evening (Patient taking differently: Take 60 mg by mouth every evening Take 30 mg   In AM, take 60 mg  @ 4PM ) 180 tablet 3    ondansetron (ZOFRAN) 4 mg tablet Take 1 tablet (4 mg total) by mouth every 8 (eight) hours as needed for nausea or vomiting 20 tablet 0    sodium bicarbonate 650 mg tablet Take 1 tablet (650 mg total) by mouth 4 (four) times a day 120 tablet 6    torsemide (DEMADEX) 20 mg tablet Take 1 tablet (20 mg total) by mouth every other day (Patient taking differently: Take 10 mg by mouth daily 1/2 tablet daily ) 90 tablet 3    B-D ULTRAFINE III SHORT PEN 31G X 8 MM MISC Inject under the skin 4 (four) times a day 400 each 5     No current facility-administered medications for this visit  Current Outpatient Prescriptions on File Prior to Visit   Medication Sig    atorvastatin (LIPITOR) 40 mg tablet Take 1 tablet (40 mg total) by mouth every evening    chlorthalidone (HYGROTEN) 50 MG tablet Take 1 tablet (50 mg total) by mouth daily    Cholecalciferol (VITAMIN D) 2000 units CAPS Take 1 capsule by mouth daily    clopidogrel (PLAVIX) 75 mg tablet Take 1 tablet (75 mg total) by mouth daily    escitalopram (LEXAPRO) 10 mg tablet Take 1 tablet (10 mg total) by mouth daily    folic acid (FOLVITE) 1 mg tablet Take 1 tablet (1 mg total) by mouth daily    hydrALAZINE (APRESOLINE) 100 MG tablet Take 1 tablet (100 mg total) by mouth 3 (three) times a day    insulin glargine (LANTUS) 100 units/mL subcutaneous injection Inject 8 Units under the skin daily with breakfast (Patient taking differently: Inject 10 Units under the skin 2 (two) times a day  )    insulin lispro (HumaLOG) 100 units/mL injection Inject 5 Units under the skin 3 (three) times a day with meals U-100 pen    labetalol (NORMODYNE) 300 mg tablet Take 0 5 tablets (150 mg total) by mouth every 8 (eight) hours (Patient taking differently: Take 150 mg by mouth every 8 (eight) hours Take 150 mg  In AM, 150 mg  At 2:00 PM and 300 mg  At 10:00 PM )    NIFEdipine ER (ADALAT CC) 60 MG 24 hr tablet Take 1 tablet (60 mg total) by mouth every evening (Patient taking differently: Take 60 mg by mouth every evening Take 30 mg   In AM, take 60 mg  @ 4PM )    ondansetron (ZOFRAN) 4 mg tablet Take 1 tablet (4 mg total) by mouth every 8 (eight) hours as needed for nausea or vomiting    sodium bicarbonate 650 mg tablet Take 1 tablet (650 mg total) by mouth 4 (four) times a day    torsemide (DEMADEX) 20 mg tablet Take 1 tablet (20 mg total) by mouth every other day (Patient taking differently: Take 10 mg by mouth daily 1/2 tablet daily )    B-D ULTRAFINE III SHORT PEN 31G X 8 MM MISC Inject under the skin 4 (four) times a day     No current facility-administered medications on file prior to visit  He is allergic to sulfa antibiotics            Objective:    Blood pressure (!) 195/95, pulse 83, height 5' 11" (1 803 m), weight 96 6 kg (213 lb)  Physical Exam/Neurological Exam  CONSTITUTIONAL: NAD, pleasant  NECK: supple, no lymphadenopathy, no thyromegaly, no JVD  CARDIOVASCULAR: RRR, normal S1S2, no murmurs, no rubs  RESP: clear to auscultation bilaterally, no wheezes/rhonchi/rales  ABDOMEN: soft, non tender, non distended  SKIN: no rash or skin lesions  EXTREMITIES: no edema, pulses 2+bilaterally  PSYCH: appropriate mood and affect  NEUROLOGIC COMPREHENSIVE EXAM: Patient is oriented to person, place and time, NAD; appropriate affect  CN II, III, IV, V, VI, VII,VIII,IX,X,XI-XII intact with EOMI,nystagmus to the right on horizontal gaze;  PERRLA, OKN intact, VF grossly intact, fundi poorly visualized secondary to pupillary constriction; symmetric face noted  Motor: 5/5 UE/LE bilateral symmetric; Sensory: intact to light touch and pinprick bilaterally; normal vibration sensation feet bilaterally; Coordination within normal limits on FTN and VERA testing; DTR: 2/4 through, no Babinski, no clonus  Tandem gait is intact  Romberg: negative  ROS:  12 points of review of system was reviewed with the patient and was unremarkable with exception: see HPI  Review of Systems   Constitutional: Negative for appetite change and fever  HENT: Negative  Negative for hearing loss, tinnitus, trouble swallowing and voice change  Eyes: Negative  Negative for photophobia and pain  Respiratory: Negative  Negative for shortness of breath  Cardiovascular: Negative  Negative for palpitations  Gastrointestinal: Positive for diarrhea  Negative for nausea and vomiting  Endocrine: Negative  Negative for cold intolerance and heat intolerance  Genitourinary: Negative  Negative for dysuria, frequency and urgency  Musculoskeletal: Negative  Negative for myalgias and neck pain  Skin: Negative  Negative for rash  Neurological: Positive for dizziness and weakness  Negative for tremors, seizures, syncope, facial asymmetry, speech difficulty, light-headedness, numbness and headaches  Balance problems     Hematological: Negative  Does not bruise/bleed easily  Psychiatric/Behavioral: Negative  Negative for confusion, hallucinations and sleep disturbance

## 2018-05-29 ENCOUNTER — APPOINTMENT (OUTPATIENT)
Dept: LAB | Facility: CLINIC | Age: 33
End: 2018-05-29
Payer: COMMERCIAL

## 2018-05-29 ENCOUNTER — TRANSCRIBE ORDERS (OUTPATIENT)
Dept: LAB | Facility: CLINIC | Age: 33
End: 2018-05-29

## 2018-05-29 DIAGNOSIS — G37.9 CNS DEMYELINATING DISEASE (HCC): Primary | ICD-10-CM

## 2018-05-29 DIAGNOSIS — G37.9 CNS DEMYELINATING DISEASE (HCC): ICD-10-CM

## 2018-05-29 DIAGNOSIS — N18.4 CHRONIC KIDNEY DISEASE, STAGE IV (SEVERE) (HCC): ICD-10-CM

## 2018-05-29 DIAGNOSIS — N18.4 CHRONIC KIDNEY DISEASE, STAGE 4 (SEVERE) (HCC): Chronic | ICD-10-CM

## 2018-05-29 LAB
ALBUMIN SERPL BCP-MCNC: 3.3 G/DL (ref 3.5–5)
ALP SERPL-CCNC: 86 U/L (ref 46–116)
ALT SERPL W P-5'-P-CCNC: 20 U/L (ref 12–78)
ANION GAP SERPL CALCULATED.3IONS-SCNC: 10 MMOL/L (ref 4–13)
AST SERPL W P-5'-P-CCNC: 15 U/L (ref 5–45)
BILIRUB SERPL-MCNC: 0.4 MG/DL (ref 0.2–1)
BUN SERPL-MCNC: 59 MG/DL (ref 5–25)
CALCIUM SERPL-MCNC: 8.9 MG/DL (ref 8.3–10.1)
CHLORIDE SERPL-SCNC: 107 MMOL/L (ref 100–108)
CO2 SERPL-SCNC: 24 MMOL/L (ref 21–32)
CREAT SERPL-MCNC: 5.12 MG/DL (ref 0.6–1.3)
GFR SERPL CREATININE-BSD FRML MDRD: 14 ML/MIN/1.73SQ M
GLUCOSE P FAST SERPL-MCNC: 188 MG/DL (ref 65–99)
POTASSIUM SERPL-SCNC: 4.8 MMOL/L (ref 3.5–5.3)
PROT SERPL-MCNC: 6.2 G/DL (ref 6.4–8.2)
SODIUM SERPL-SCNC: 141 MMOL/L (ref 136–145)

## 2018-05-29 PROCEDURE — 83520 IMMUNOASSAY QUANT NOS NONAB: CPT

## 2018-05-29 PROCEDURE — 82657 ENZYME CELL ACTIVITY: CPT

## 2018-05-29 PROCEDURE — 82726 LONG CHAIN FATTY ACIDS: CPT

## 2018-05-29 PROCEDURE — 80053 COMPREHEN METABOLIC PANEL: CPT

## 2018-05-29 PROCEDURE — 36415 COLL VENOUS BLD VENIPUNCTURE: CPT

## 2018-05-30 ENCOUNTER — APPOINTMENT (OUTPATIENT)
Dept: OCCUPATIONAL THERAPY | Facility: CLINIC | Age: 33
End: 2018-05-30
Payer: COMMERCIAL

## 2018-05-30 ENCOUNTER — EVALUATION (OUTPATIENT)
Dept: PHYSICAL THERAPY | Facility: CLINIC | Age: 33
End: 2018-05-30
Payer: COMMERCIAL

## 2018-05-30 DIAGNOSIS — I63.9 CEREBROVASCULAR ACCIDENT (CVA) OF LEFT PONTINE STRUCTURE (HCC): Primary | ICD-10-CM

## 2018-05-30 PROCEDURE — 97112 NEUROMUSCULAR REEDUCATION: CPT | Performed by: PHYSICAL THERAPIST

## 2018-05-30 PROCEDURE — 97110 THERAPEUTIC EXERCISES: CPT | Performed by: PHYSICAL THERAPIST

## 2018-05-30 NOTE — PROGRESS NOTES
Re-evaluation    Today's date: 2018  Patient name: Marlene Christopher  : 1985  MRN: [id number]  Referring provider: John Ruano DO  Dx:   No diagnosis found  Assessment  Impairments: abnormal coordination, abnormal gait, activity intolerance, impaired balance, impaired physical strength and lacks appropriate home exercise program  Other impairment: Dizziness  Patient presents with symptom irritability no  Assessment details: Since last re-evaluation patient ahs made minimal improvements in overall levels of dizziness, balance, endurance, gait efficiency and functional improvements  However patient recently had brain MRI which showed changes in whitematter  Lack of progress may be due to changes  Pt is independent with Hep  He will be placed on hold at this time to continue with HEP independently  Plan to complete RE in 4 weeks to establish if patient is declining or improving or staying the same     Barriers to therapy: Dizziness  Understanding of Dx/Px/POC: good   Prognosis: good    Goals  STG 1: Patient will complete the Callejas Balance Assessment with a score of 52/56 in 4 weeks - partially met  STG 2: Patient will ambulate 1150 feet during the 6 MWT with no AD and good stability in 4 weeks - partially met  STG 3: Patient will complete the 5x STS in <15 seconds with no UE use in 4 weeks - met  STG 4: Patient will maintain his balance with feet together and eyes closed on the foam for 30 seconds in 4 weeks - NOT MET    LTG 1: Patient will deny falls or near falls in 8 weeks - met  LTG 2: Patient will be independent with HEP in 8 weeks - partially met  LTG 3: Patient will be an independent community ambulator with a gait speed of >0 45 ft/sec to safely negotiate streets in 8 weeks - partially met      Plan  Patient would benefit from: skilled PT and OT eval  Referral necessary: Yes  Planned therapy interventions: neuromuscular re-education, patient education, balance, therapeutic exercise, therapeutic activities, strengthening and home exercise program  Frequency: 2x week  Duration in weeks: 12  Treatment plan discussed with: patient        Subjective Evaluation    History of Present Illness  Date of onset: 1/22/2018  Mechanism of injury: Patient reports that he went to his family's cabin 1 week ago and was throwing up the entire time and had a sore throat but since then has been very dizzy  Patient reports that he feels like his head is spinning  Patient reports that he was having some dizziness before the trip due to impaired vision but now he feels like it is very severe even when he is sitting or lying with his eyes closed  Patient reports that otherwise, being at the cabin with good PT and he was able to negotiate the uneven ground with a little instability but no falls     Not a recurrent problem   Quality of life: fair    Pain  No pain reported    Social Support  Steps to enter house: yes  2  Stairs in house: yes (landing in the milddle of the flight)   20  Lives in: multiple-level home  Lives with: parents    Employment status: not working (Previously looking for a job and working for [employer])  Hand dominance: right      Diagnostic Tests  MRI studies: abnormal  CT scan: abnormal  Treatments  Current treatment: physical therapy and occupational therapy        Objective  PT/OT Neuro Exam  Neurologic Exam     Dizziness  Current: 2/10  At worst: 8/10   At best: 1/10  Exacerbating factors: rolling over in bed, position changes    Balance Test    6 Minute Walk Test (ft): 1100 feet with no AD - 1150 no AD 5/30   Gait Speed (ft/s): 4 16 - not assessed this session due to dizziness   5x Sit To Stand (s): 13 9 sec from purple chair         MCTSIB Number of Seconds   Feet Together, Eyes Open 30   Feet Together, Eyes Closed 30   Feet Together, Eyes Open Foam 30   Feet Together, Eyes Closed Foam 8     Callejas: Indicates no increased risk for falls    Manual Muscle Testing - Hip Left Right   Flexion 5 5 Extension 4 4   Abduction 4 4   External Rotation 4 4     Manual Muscle Testing - Knee Left Right   Flexion 5 5   Extension 5 5     Manual Muscle Testing - Ankle Left Right   Doriflexion 4 4   Plantarflexion 3 3        Transfers    Sit To Stand Independent   W/C To Bed Independent   Sit To Supine Independent   Roll Independent       Gait Assessment:   Decreased trunk rotation, moderate path deviation, wide COLBY, decreased reciprocal arm swing    Mild left facial drooping noted, denies difficulty with swallowing or drinking    VOMS (Vestibular/Ocular Motor Screen)-- blurry vision     Resting position: left lateral      Smooth pursuit Abnormal     Saccades (horizontal) Normal, slowed in all directions     Saccades (vertical)  Normal, slowed in all directions     Convergence: Insufficiency - decreased teaming bilaterally    Cervical ROM: WFL  Alar ligament: Neg  Sharp Lazaro: Neg   Axial compression: Neg  Spurling's: Neg  Modified VBI screen: Mild increase in dizziness with bending over with rotation      Positional testing: Right Left   Oscar Gonzalez pike Mild symptoms +   Roll test: - -         Precautions: CVA with visual disturbances, CKD, DM type 1, HTN, falls     Daily Treatment Diary         Exercise Diary  3/23  3/28 4/9  3/14/18  3/16   Bike 10 min, L3  10 min   l2 x10 min  10 min   Treadmill           FTEO    airex 30"x3        FAEC   airex 30"x3        Tandem gait 3 laps   3 laps  Mini //   3 laps  in gonzalez  40'x2, arms out  3 laps   Semi-tandem stance      semi  [id number] EC     HK marches 2# ankle weights 1 lap gonzalez  10ft // bars 4 laps no UE Arms over chest 15x on foam  in gonzalez  40'x2     STS feet staggered 15x L foot back  15x R foot back from orange chair  foam- 30x     3x10   Step taps from foam   20x ea cones   8"  2x30     Rockerboard taps 20xea EC A/P/ lat 15x -   M/L, A/P  1 min ea  40x ea   Heel/toe walking  Gonzalez 1 lap ea  2 laps ea    heel walking/toe walking 3 laps ea    cone taps   20x side stepping  From foam  20xea     Step downs 6"     6" R/L  2x15     fwd/bwd walking with HTs, H/V  1 lap ea  1 lap ea   2x40' each      lunges 10x2 ea  15xea Deferred due to dizziness    3 laps    step ups  6" foam on floor and step  10xea  8" foam 15x ea     foam 2x15 ea

## 2018-06-01 ENCOUNTER — APPOINTMENT (OUTPATIENT)
Dept: OCCUPATIONAL THERAPY | Facility: CLINIC | Age: 33
End: 2018-06-01
Payer: COMMERCIAL

## 2018-06-01 ENCOUNTER — APPOINTMENT (OUTPATIENT)
Dept: PHYSICAL THERAPY | Facility: CLINIC | Age: 33
End: 2018-06-01
Payer: COMMERCIAL

## 2018-06-01 LAB — AQP4 H2O CHANNEL AB SERPL IA-ACNC: <1.5 U/ML (ref 0–3)

## 2018-06-04 ENCOUNTER — OFFICE VISIT (OUTPATIENT)
Dept: HEMATOLOGY ONCOLOGY | Facility: CLINIC | Age: 33
End: 2018-06-04
Payer: COMMERCIAL

## 2018-06-04 VITALS
HEIGHT: 71 IN | OXYGEN SATURATION: 97 % | DIASTOLIC BLOOD PRESSURE: 82 MMHG | BODY MASS INDEX: 29.68 KG/M2 | RESPIRATION RATE: 16 BRPM | SYSTOLIC BLOOD PRESSURE: 122 MMHG | WEIGHT: 212 LBS | TEMPERATURE: 98.4 F | HEART RATE: 96 BPM

## 2018-06-04 DIAGNOSIS — D68.52 HETEROZYGOUS FOR PROTHROMBIN G20210A MUTATION (HCC): ICD-10-CM

## 2018-06-04 DIAGNOSIS — N18.4 ANEMIA IN STAGE 4 CHRONIC KIDNEY DISEASE (HCC): Primary | ICD-10-CM

## 2018-06-04 DIAGNOSIS — Z15.89 HOMOZYGOUS MTHFR MUTATION C677T: Chronic | ICD-10-CM

## 2018-06-04 DIAGNOSIS — D63.1 ANEMIA IN STAGE 4 CHRONIC KIDNEY DISEASE (HCC): Primary | ICD-10-CM

## 2018-06-04 LAB
LEFT EYE DIABETIC RETINOPATHY: NORMAL
RIGHT EYE DIABETIC RETINOPATHY: NORMAL

## 2018-06-04 PROCEDURE — 3072F LOW RISK FOR RETINOPATHY: CPT | Performed by: INTERNAL MEDICINE

## 2018-06-04 PROCEDURE — 99215 OFFICE O/P EST HI 40 MIN: CPT | Performed by: PHYSICIAN ASSISTANT

## 2018-06-04 RX ORDER — ASPIRIN 81 MG/1
81 TABLET ORAL DAILY
COMMUNITY
End: 2018-08-24

## 2018-06-04 NOTE — PROGRESS NOTES
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  Currently on Plavix and aspirin for 1 additional month (through mid June) per neurology  Continue Folic acid 1-2 mg p o  Daily     3  Anemia due to CKD/ CD  Stable since at least 2/2018 at 8 8- 8 9  Aranesp not given due to history of CVA and  asymptomatic from anemia point of view  4   CKD  Cr 4-5  Mg/dL since 2/2018  Follows with Dr Magi Vasquez   HD/PD have been discussed  5   MS like legions on imaging  Seeing neurology  6   History of tobacco use  Has stopped smoking  At this time, we will see him prn  Presenting Complaint: prothrombin gene mutation, history of stroke x2    History of Presenting Illness: 75-year-old  male with past medical history of hypertension, type 1 diabetes mellitus, insulin dependent, had a history of stroke at [facility] in 2015  Evaluation during that encounter revealed 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 MTHFR and heterozygosity for prothrombin gene mutation  Hewas admitted to the hospital in January 2018 with hypertensive crisis, was found to have weakness consistent with ischemic pontine area stroke, He was evaluated by Neurology, thrombophilia profile showed heterozygous for prothrombin gene mutation, elevation of homocystine at 16  He also had acute chronic kidney disease imposed on a chronic kidney disease with creatinine of 5, anemia secondary to chronic kidney insufficiency with hemoglobin of 8 9, normal iron studies  He was on folic acid, Plavix 75 mg p o   Daily, aspirin 81 mg daily   Prior to the current admission to the hospital the patient told me that he was taking baby aspirin like 3 to 4 times a week  He was evaluated by Neurology as well   He smokes 1 pack of cigarettes daily   He uses marijuana intermittently  He does not drink  No family history of blood disorders    2/28/2018: No monoclonal bands on SPEP, normal CRP, ESR 43, 16 5, normal iron studies  Interval History:  Has been undergoing PT  Will be establishing with endocrinology at [facility] 9/2018  Seen by Neuro-opthalmologist in [place]  Vision improving  Review of Systems - As stated in the HPI otherwise the fourteen point review of systems was negative      Past Medical History:   Diagnosis Date    [facility] [address] [facility]     Cerebellar stroke side undetermined 2015 2013    Diabetes type 1, controlled ([facility] [address] [facility]     Hypertension        Social History     Social History    Marital status: Single     Spouse name: N/A    Number of children: N/A    Years of education: N/A     Occupational History          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     Social History Main Topics    Smoking status: Former Smoker     Packs/day: 0 50     Years: 12 00     Types: Cigarettes    Smokeless tobacco: Never Used      Comment: quit 2/2018    Alcohol use Yes      Comment: socially    Drug use: Yes     Frequency: 3 0 times per week     Types: Marijuana      Comment: weekly    Sexual activity: Not on file     Other Topics Concern    Not on file     Social History Narrative    Caffeine use    single       Family History   Problem Relation Age of Onset    Breast cancer Mother     Hypertension Mother     Hyperlipidemia Father     Hypertension Father     Leukemia Maternal Grandmother     Hyperlipidemia Maternal Grandfather     Hypertension Maternal Grandfather     Hyperlipidemia Paternal Grandmother     Hypertension Paternal Grandmother     Heart disease Paternal Grandfather      cardiac disorder    Diabetes Paternal Grandfather        Allergies   Allergen Reactions    Sulfa Antibiotics Rash Current Outpatient Prescriptions:     atorvastatin (LIPITOR) 40 mg tablet, Take 1 tablet (40 mg total) by mouth every evening, Disp: 30 tablet, Rfl: 5    B-D ULTRAFINE III SHORT PEN 31G X 8 MM MISC, Inject under the skin 4 (four) times a day, Disp: 400 each, Rfl: 5    chlorthalidone (HYGROTEN) 50 MG tablet, Take 1 tablet (50 mg total) by mouth daily, Disp: 30 tablet, Rfl: 0    Cholecalciferol (VITAMIN D) 2000 units CAPS, Take 1 capsule by mouth daily, Disp: , Rfl:     clopidogrel (PLAVIX) 75 mg tablet, Take 1 tablet (75 mg total) by mouth daily, Disp: 30 tablet, Rfl: 5    escitalopram (LEXAPRO) 10 mg tablet, Take 1 tablet (10 mg total) by mouth daily, Disp: 30 tablet, Rfl: 5    folic acid (FOLVITE) 1 mg tablet, Take 1 tablet (1 mg total) by mouth daily, Disp: 30 tablet, Rfl: 5    hydrALAZINE (APRESOLINE) 100 MG tablet, Take 1 tablet (100 mg total) by mouth 3 (three) times a day, Disp: 270 tablet, Rfl: 3    insulin glargine (LANTUS) 100 units/mL subcutaneous injection, Inject 8 Units under the skin daily with breakfast (Patient taking differently: Inject 10 Units under the skin 2 (two) times a day  ), Disp: 10 mL, Rfl: 0    insulin lispro (HumaLOG) 100 units/mL injection, Inject 5 Units under the skin 3 (three) times a day with meals U-100 pen, Disp: 10 mL, Rfl: 2    labetalol (NORMODYNE) 300 mg tablet, Take 0 5 tablets (150 mg total) by mouth every 8 (eight) hours (Patient taking differently: Take 150 mg by mouth every 8 (eight) hours Take 150 mg  In AM, 150 mg  At 2:00 PM and 300 mg  At 10:00 PM ), Disp: 60 tablet, Rfl: 3    NIFEdipine ER (ADALAT CC) 60 MG 24 hr tablet, Take 1 tablet (60 mg total) by mouth every evening (Patient taking differently: Take 60 mg by mouth every evening Take 30 mg   In AM, take 60 mg  @ 4PM ), Disp: 180 tablet, Rfl: 3    ondansetron (ZOFRAN) 4 mg tablet, Take 1 tablet (4 mg total) by mouth every 8 (eight) hours as needed for nausea or vomiting, Disp: 20 tablet, Rfl: 0    sodium bicarbonate 650 mg tablet, Take 1 tablet (650 mg total) by mouth 4 (four) times a day, Disp: 120 tablet, Rfl: 6    torsemide (DEMADEX) 20 mg tablet, Take 1 tablet (20 mg total) by mouth every other day (Patient taking differently: Take 10 mg by mouth daily 1/2 tablet daily ), Disp: 90 tablet, Rfl: 3      There were no vitals taken for this visit  General Appearance:    Alert, oriented        Eyes:    Right eye patched  Ears:    Normal external ear canals, both ears   Nose:   Nares normal, septum midline   Throat:   Mucosa moist  Pharynx without injection  Neck:   Supple       Lungs:     Clear to auscultation bilaterally   Chest Wall:    No tenderness or deformity    Heart:    Regular rate and rhythm       Abdomen:     Soft, non-tender, bowel sounds +, no organomegaly           Extremities:   Extremities no cyanosis or edema       Skin:   no rash or icterus  Lymph nodes:   Cervical, supraclavicular, and axillary nodes normal   Neurologic:   Right steppage gait, mouth droop  No results found for this or any previous visit (from the past 48 hour(s))  Vas Renal Artery Complete    Result Date: 2/12/2018  Narrative:  [facility]PORT [facility]INICAL: Indications:  Benign Essential Hypertension [I10]  Presents with mild HA and nausea and systolic pressure over [id number]  Risk Factors The patient has history of obesity, hypertension, diabetes (Type I) and previous smoking (quit 1-5yrs ago)  H/O 2 strokes, CKD In July of 2015 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 and also heterozygote for prothrombin gene mutation Clinical Right Pressure:  165/88 mm Hg  FINDINGS:  Unilateral     Impression      PSV (cm/s)  Sup-Otilia Ao                            178  Px Inf-Jitendra Ao                         147  Ds Inf-Jitendra Ao                         106  Prox   SMA      Not visualized               Right Impression  PSV (cm/s)  EDV (cm/s)   RAR    RI  Kidney (cm)  Ostial Renal                      135          27  0 76                     Prox Renal     Normal             141          21  0 79  0 85               Mid Renal                         115          26  0 64  0 81               Dist Renal                         60          12  0 34  0 81               Celiac Artery                      27           8        0 71               Kidney                                                               11 20  Renal          Patent                                                        Left           Impression      PSV (cm/s)  EDV (cm/s)   RAR    RI  Kidney (cm)  Prox Renal     Not Visualized                                                   Dist Renal                             80              0 45                     Celiac Artery                          17           3        0 82               Kidney                                                                   12 10     CONCLUSION:  Impression The abdominal aorta is widely patent and normal caliber  RIGHT RENAL: No evidence of significant arterial occlusive disease in the main renal artery  Patent renal vein Adequate parenchymal flow noted with a renovascular resistive index of  71  Renal/Aorta Ratio:  79   The Kidney measures 11 2 cm  LEFT RENAL: The renovascular resistive index of  82 may suggest intrinsic parenchymal disease The Kidney measures 12 cm  Tech note: Limited study due to excessive bowel gas left renal artery and vein and mesenteric arteries not seen  SIGNATURE: Electronically Signed by: Leonadr Marin on 2018-02-12 12:40:26 PM    Xr Chest Portable Icu    Result Date: 2/12/2018  Narrative: CHEST INDICATION:  Dyspnea  COMPARISON:  1/22/2018 VIEWS:   AP frontal IMAGES:  1 FINDINGS: Heart size within normal limits for portable technique  Normal pulmonary vasculature  New right lower lobe consolidation    No pneumothorax or effusion  Bones are unremarkable  Impression: Right lower lobe pneumonia  The study was marked in EPIC for significant notification   Workstation performed: [id number]

## 2018-06-05 LAB — MISCELLANEOUS LAB TEST RESULT: NORMAL

## 2018-06-06 ENCOUNTER — APPOINTMENT (OUTPATIENT)
Dept: LAB | Facility: CLINIC | Age: 33
End: 2018-06-06
Payer: COMMERCIAL

## 2018-06-06 DIAGNOSIS — E10.21 TYPE 1 DIABETES MELLITUS WITH DIABETIC NEPHROPATHY, WITH LONG-TERM CURRENT USE OF INSULIN (HCC): Chronic | ICD-10-CM

## 2018-06-06 LAB
EST. AVERAGE GLUCOSE BLD GHB EST-MCNC: 105 MG/DL
HBA1C MFR BLD: 5.3 % (ref 4.2–6.3)

## 2018-06-06 PROCEDURE — 83036 HEMOGLOBIN GLYCOSYLATED A1C: CPT

## 2018-06-06 PROCEDURE — 36415 COLL VENOUS BLD VENIPUNCTURE: CPT

## 2018-06-08 DIAGNOSIS — N18.4 CKD (CHRONIC KIDNEY DISEASE), STAGE IV (HCC): Primary | ICD-10-CM

## 2018-06-12 ENCOUNTER — APPOINTMENT (OUTPATIENT)
Dept: LAB | Facility: CLINIC | Age: 33
End: 2018-06-12
Payer: COMMERCIAL

## 2018-06-12 DIAGNOSIS — N18.4 CKD (CHRONIC KIDNEY DISEASE), STAGE IV (HCC): ICD-10-CM

## 2018-06-12 LAB
ANION GAP SERPL CALCULATED.3IONS-SCNC: 10 MMOL/L (ref 4–13)
BUN SERPL-MCNC: 51 MG/DL (ref 5–25)
CALCIUM SERPL-MCNC: 8.8 MG/DL (ref 8.3–10.1)
CHLORIDE SERPL-SCNC: 106 MMOL/L (ref 100–108)
CO2 SERPL-SCNC: 24 MMOL/L (ref 21–32)
CREAT SERPL-MCNC: 5.64 MG/DL (ref 0.6–1.3)
GFR SERPL CREATININE-BSD FRML MDRD: 12 ML/MIN/1.73SQ M
GLUCOSE SERPL-MCNC: 182 MG/DL (ref 65–140)
POTASSIUM SERPL-SCNC: 5.1 MMOL/L (ref 3.5–5.3)
SODIUM SERPL-SCNC: 140 MMOL/L (ref 136–145)

## 2018-06-12 PROCEDURE — 80048 BASIC METABOLIC PNL TOTAL CA: CPT

## 2018-06-12 PROCEDURE — 36415 COLL VENOUS BLD VENIPUNCTURE: CPT

## 2018-06-14 ENCOUNTER — OFFICE VISIT (OUTPATIENT)
Dept: NEPHROLOGY | Facility: CLINIC | Age: 33
End: 2018-06-14
Payer: COMMERCIAL

## 2018-06-14 VITALS
SYSTOLIC BLOOD PRESSURE: 168 MMHG | WEIGHT: 213 LBS | BODY MASS INDEX: 29.82 KG/M2 | DIASTOLIC BLOOD PRESSURE: 86 MMHG | HEIGHT: 71 IN | HEART RATE: 78 BPM

## 2018-06-14 DIAGNOSIS — E55.9 VITAMIN D DEFICIENCY: Primary | ICD-10-CM

## 2018-06-14 DIAGNOSIS — R60.0 BILATERAL LEG EDEMA: ICD-10-CM

## 2018-06-14 DIAGNOSIS — N18.4 CKD (CHRONIC KIDNEY DISEASE) STAGE 4, GFR 15-29 ML/MIN (HCC): ICD-10-CM

## 2018-06-14 DIAGNOSIS — I10 ACCELERATED HYPERTENSION: ICD-10-CM

## 2018-06-14 LAB
C 26:1: 0.16
C22:0: 14.01
C22:1(N-9): 1
C24:0: 13.68
C24:22: 0.98
C26:0: 0.3
C26:22: 0.02
METHOD: NORMAL
PHYTANATE SERPL-MCNC: 0.85 UG/ML
PRISTANATE SERPL-MCNC: 0.11 UG/ML
REFERENCE: NORMAL
VLCFA INTERPRETATION: NORMAL

## 2018-06-14 PROCEDURE — 99214 OFFICE O/P EST MOD 30 MIN: CPT | Performed by: NURSE PRACTITIONER

## 2018-06-14 RX ORDER — NIFEDIPINE 60 MG/1
60 TABLET, FILM COATED, EXTENDED RELEASE ORAL EVERY EVENING
Qty: 30 TABLET | Refills: 5 | Status: SHIPPED | OUTPATIENT
Start: 2018-06-14 | End: 2018-07-18 | Stop reason: SDUPTHER

## 2018-06-14 RX ORDER — CLONIDINE HYDROCHLORIDE 0.1 MG/1
0.1 TABLET ORAL DAILY
COMMUNITY
End: 2018-07-25 | Stop reason: SDUPTHER

## 2018-06-14 RX ORDER — ERGOCALCIFEROL (VITAMIN D2) 1250 MCG
50000 CAPSULE ORAL
Qty: 12 CAPSULE | Refills: 0 | Status: SHIPPED | OUTPATIENT
Start: 2018-06-14 | End: 2018-09-17 | Stop reason: HOSPADM

## 2018-06-14 NOTE — PATIENT INSTRUCTIONS
1   Hold torsemide  Please take a dose if weight gain goes up by 3 lb in 1 day or 5 lb in 1 week  2  Vitamin-D 2, ergo calciferol 85491 units monthly  3  Continue current medications except for caveat of holding torsemide  4  I will provide you with orders for blood work every 2 weeks  5  Please view choices video that I have given you  6  I will let you know about question regarding insulin pump  7  Please call us when you have made a decision regarding modality  We can refer you to a surgeon if needed

## 2018-06-14 NOTE — PROGRESS NOTES
NEPHROLOGY OFFICE VISIT   Michael Mckeon 27 y o  male MRN: [id number]  6/14/2018    Reason for Visit:  Chronic kidney disease, hypertension    ASSESSMENT and PLAN:    I had the pleasure of seeing Carla Dunne today in the renal clinic for the continued management of chronic kidney disease and hypertension  He was last seen in the nephrology clinic on May 1, 2018 by Dr Jessica Constant has had several hospitalizations this year for acute CVA, acute kidney injury on CKD, hypertensive urgency  Last year creatinine was between 2-2 4 but since hospitalization in January and February creatinine has been elevated and more recently is up to 5 64  Blood pressure has been extremely difficult to control  Multiple medication adjustments over the last several months  More recently patient was instructed to take clonidine a chest nightly which has helped a m  blood pressure readings  Interestingly, Carla Dunne feels symptomatic i e  dizzy when blood pressure is in the low 130s  Last protein creatinine ratio 4 4  He currently has no complaints at this time and is feeling well  Patient denies any chest pain, shortness of breath and swelling  Although his vision is poor it has somewhat improved since his eye deviation has lessened  His mother, Gaynelle Lesches is in attendance and is very involved in [facility]  The last blood work was done on 6/12/18, which we have reviewed together  ASSESSMENT and PLAN:   1  Chronic kidney disease stage IV: Former baseline creatinine 2 3-2 5  Progressive worsening of renal function  Last creatinine up to 5 64 with an eGFR of 12    · CKD etiology likely hypertensive nephrosclerosis, diabetic nephropathy  · Attended [facility] class  · I had a long discussion with Carla Dunne and his mother regarding modality choices  His mother was concerned because she was thinking that if Carla Dunne was on PD would not have very much supervision    After I explained to her training and follow-up she was more amenable to consider this modality  I did give them a video to watch-"choices", which has actual patient is talking about their choice and how it has affected them  I suggested follow up within 4 weeks  I also plan to give Amina Regan a call next week to discuss his decision and to possibly initiate referral to surgeon for placement of PD catheter  2  Nephrotic range proteinuria: Microalbumin creatinine ratio 9281 mg/g in January 2018  Diabetic nephropathy suspected  More recently urine protein creatinine ratio 4 4  3  Hypertension:   · Antihypertensives- labetalol 150 mg in the morning and in the afternoon and 300mg hs, chlorthalidone 50mg daily, hydralazine 100mg TID, nifedipine 60mg BID, torsemide 20mg BID  CLONIDINE RECENTLY ADDED HS  Better a m  blood pressure control although blood pressures are generally in the 160s in the a m     If his blood pressure is lower any takes his a m  blood pressure medications blood pressure declines too much and he becomes symptomatic  · Volume-no edema  Will hold on torsemide and dose if weight goes up or edema returns  I will be checking with volume next week regarding weights, edema in blood pressure readings   · Renal artery doppler evaluation-no evidence of renovascular disease on the right, unable to visualize left renal artery due to excessive bowel gas  · Previous workup- Aldosterone level less than 1 0, renin 0 288- PAC/PRA ratio 3 47   Which does not support primary aldosteronism  Metanephrine level within normal limits 36,  normetanephrine level elevated 177  TSH normal  4  Hyperkalemia:    · Resolved  Potassium level has been fairly stable  Currently 5 1 (creatinine generally 4 6-5 1)  On a low-potassium diet  5  Anemia:  No HETAL due to CVA  Iron saturation 28%, ferritin level 222   6  Recent CVA left pontine, history of lacunar CVA  7  Hyperhomocystinemia:  Hematology follow-up  8  CKD MBD:  Vitamin-D level 24 9 in April    Patient is on vitamin D3 2000 units per day  I also provided him with a prescription for ergo calciferol 84879 units monthly      PATIENT INSTRUCTIONS:    Patient Instructions   1  Hold torsemide  Please take a dose if weight gain goes up by 3 lb in 1 day or 5 lb in 1 week  2  Vitamin-D 2, ergo calciferol 68065 units monthly  3  Continue current medications except for caveat of holding torsemide  4  I will provide you with orders for blood work every 2 weeks  5  Please view choices video that I have given you  6  I will let you know about question regarding insulin pump  7  Please call us when you have made a decision regarding modality  We can refer you to a surgeon if needed  Orders Placed This Encounter   Procedures    Basic metabolic panel     This is a patient instruction: Patient fasting for 8 hours or longer recommended  Standing Status:   Standing     Number of Occurrences:   6     Standing Expiration Date:   9/14/2018    CBC     Standing Status:   Future     Standing Expiration Date:   9/14/2018       OBJECTIVE:  Current Weight: Weight - Scale: 96 6 kg (213 lb)  Vitals:    06/14/18 0957 06/14/18 1047 06/14/18 1049   BP:  164/80 168/86   BP Location:  Left arm Right arm   Patient Position:  Sitting Sitting   Cuff Size:  Standard Standard   Pulse:  78    Weight: 96 6 kg (213 lb)     Height: 5' 11" (1 803 m)      Body mass index is 29 71 kg/m²  REVIEW OF SYSTEMS:    Review of Systems   Constitutional: Negative  Negative for appetite change, fatigue, fever and unexpected weight change  HENT: Negative  Eyes: Positive for visual disturbance  Respiratory: Negative  Cardiovascular: Negative  Gastrointestinal: Negative  Genitourinary: Negative  Musculoskeletal: Negative  Skin: Negative  Neurological: Negative  Psychiatric/Behavioral: Negative  PHYSICAL EXAM:      Physical Exam   Constitutional: He is oriented to person, place, and time     Chronically ill appearing 27year-old gentleman in no acute distress  He appears relatively well-nourished   HENT:   Head: Normocephalic and atraumatic  Mouth/Throat: Oropharynx is clear and moist    Eyes: Conjunctivae are normal  Right eye exhibits no discharge  Left eye exhibits discharge  No scleral icterus  Neck: Neck supple  No JVD present  Cardiovascular: Normal rate and regular rhythm  Pulmonary/Chest: Effort normal and breath sounds normal  No respiratory distress  He has no wheezes  He has no rales  Abdominal: Soft  Bowel sounds are normal  He exhibits no distension  There is no tenderness  There is no rebound and no guarding  Musculoskeletal: He exhibits no edema  Neurological: He is alert and oriented to person, place, and time  Skin: Skin is warm and dry  No rash noted  No erythema  Psychiatric: He has a normal mood and affect   His behavior is normal  Judgment and thought content normal        Medications:    Current Outpatient Prescriptions:     aspirin (ECOTRIN LOW STRENGTH) 81 mg EC tablet, Take 81 mg by mouth daily, Disp: , Rfl:     atorvastatin (LIPITOR) 40 mg tablet, Take 1 tablet (40 mg total) by mouth every evening, Disp: 30 tablet, Rfl: 5    B-D ULTRAFINE III SHORT PEN 31G X 8 MM MISC, Inject under the skin 4 (four) times a day, Disp: 400 each, Rfl: 5    chlorthalidone (HYGROTEN) 50 MG tablet, Take 1 tablet (50 mg total) by mouth daily, Disp: 30 tablet, Rfl: 0    Cholecalciferol (VITAMIN D) 2000 units CAPS, Take 1 capsule by mouth daily, Disp: , Rfl:     cloNIDine (CATAPRES) 0 1 mg tablet, Take 0 1 mg by mouth daily, Disp: , Rfl:     clopidogrel (PLAVIX) 75 mg tablet, Take 1 tablet (75 mg total) by mouth daily, Disp: 30 tablet, Rfl: 5    escitalopram (LEXAPRO) 10 mg tablet, Take 1 tablet (10 mg total) by mouth daily, Disp: 30 tablet, Rfl: 5    hydrALAZINE (APRESOLINE) 100 MG tablet, Take 1 tablet (100 mg total) by mouth 3 (three) times a day, Disp: 270 tablet, Rfl: 3    insulin glargine (LANTUS) 100 units/mL subcutaneous injection, Inject 8 Units under the skin daily with breakfast (Patient taking differently: Inject 10 Units under the skin 2 (two) times a day  ), Disp: 10 mL, Rfl: 0    insulin lispro (HumaLOG) 100 units/mL injection, Inject 5 Units under the skin 3 (three) times a day with meals U-100 pen, Disp: 10 mL, Rfl: 2    labetalol (NORMODYNE) 300 mg tablet, Take 0 5 tablets (150 mg total) by mouth every 8 (eight) hours (Patient taking differently: Take 300 mg by mouth 2 (two) times a day Take 150 mg  In AM, 150 mg  At 2:00 PM and 300 mg  At 10:00 PM ), Disp: 60 tablet, Rfl: 3    NIFEdipine ER (ADALAT CC) 60 MG 24 hr tablet, Take 1 tablet (60 mg total) by mouth every evening Take 30 mg   In AM, take 60 mg  @ 4PM, Disp: 30 tablet, Rfl: 5    ondansetron (ZOFRAN) 4 mg tablet, Take 1 tablet (4 mg total) by mouth every 8 (eight) hours as needed for nausea or vomiting, Disp: 20 tablet, Rfl: 0    sodium bicarbonate 650 mg tablet, Take 1 tablet (650 mg total) by mouth 4 (four) times a day, Disp: 120 tablet, Rfl: 6    torsemide (DEMADEX) 20 mg tablet, Take 1 tablet (20 mg total) by mouth every other day (Patient taking differently: Take 10 mg by mouth daily 1/2 tablet daily ), Disp: 90 tablet, Rfl: 3    ergocalciferol (ERGOCALCIFEROL) 73403 units capsule, Take 1 capsule (50,000 Units total) by mouth every 30 (thirty) days for 12 days, Disp: 12 capsule, Rfl: 0    folic acid (FOLVITE) 1 mg tablet, Take 1 tablet (1 mg total) by mouth daily, Disp: 30 tablet, Rfl: 5    Laboratory Results:    Results from last 7 days  Lab Units 06/12/18  1120   SODIUM mmol/L 140   POTASSIUM mmol/L 5 1   CHLORIDE mmol/L 106   CO2 mmol/L 24   BUN mg/dL 51*   CREATININE mg/dL 5 64*   CALCIUM mg/dL 8 8   GLUCOSE RANDOM mg/dL 182*       Results for orders placed or performed in visit on 99/89/24   Basic metabolic panel   Result Value Ref Range    Sodium 140 136 - 145 mmol/L    Potassium 5 1 3 5 - 5 3 mmol/L    Chloride 106 100 - 108 mmol/L    CO2 24 21 - 32 mmol/L    Anion Gap 10 4 - 13 mmol/L    BUN 51 (H) 5 - 25 mg/dL    Creatinine 5 64 (H) 0 60 - 1 30 mg/dL    Glucose 182 (H) 65 - 140 mg/dL    Calcium 8 8 8 3 - 10 1 mg/dL    eGFR 12 ml/min/1 73sq m

## 2018-06-20 ENCOUNTER — EVALUATION (OUTPATIENT)
Dept: PHYSICAL THERAPY | Facility: CLINIC | Age: 33
End: 2018-06-20
Payer: COMMERCIAL

## 2018-06-20 DIAGNOSIS — I63.9 CEREBROVASCULAR ACCIDENT (CVA) OF LEFT PONTINE STRUCTURE (HCC): Primary | ICD-10-CM

## 2018-06-20 PROCEDURE — 97112 NEUROMUSCULAR REEDUCATION: CPT | Performed by: PHYSICAL THERAPIST

## 2018-06-20 NOTE — PROGRESS NOTES
Re-evaluation    Today's date: 2018  Patient name: Aileen Jewell  : 1985  MRN: [id number]  Referring provider: Nic Garcia DO  Dx:   No diagnosis found  Assessment  Impairments: abnormal coordination, abnormal gait, activity intolerance, impaired balance, impaired physical strength and lacks appropriate home exercise program  Other impairment: Dizziness  Patient presents with symptom irritability no  Assessment details: Since last physical therapy re-evaluation patient has made progress while at home completing HEP  Pt will be discharged at this time to independent HEP where he will continue to improve with balance, endurance, dizziness, and overall LE strength     Barriers to therapy: Dizziness  Understanding of Dx/Px/POC: good   Prognosis: good    Goals  STG 1: Patient will complete the Callejas Balance Assessment with a score of 52/56 in 4 weeks - partially met  STG 2: Patient will ambulate 1150 feet during the 6 MWT with no AD and good stability in 4 weeks - partially met  STG 3: Patient will complete the 5x STS in <15 seconds with no UE use in 4 weeks - met  STG 4: Patient will maintain his balance with feet together and eyes closed on the foam for 30 seconds in 4 weeks - NOT MET    LTG 1: Patient will deny falls or near falls in 8 weeks - met  LTG 2: Patient will be independent with HEP in 8 weeks - partially met  LTG 3: Patient will be an independent community ambulator with a gait speed of >0 45 ft/sec to safely negotiate streets in 8 weeks - partially met      Plan  Patient would benefit from: skilled PT and OT eval  Referral necessary: Yes  Planned therapy interventions: neuromuscular re-education, patient education, balance, therapeutic exercise, therapeutic activities, strengthening and home exercise program  Frequency: 2x week  Duration in weeks: 12  Treatment plan discussed with: patient        Subjective Evaluation    History of Present Illness  Date of onset: 1/22/2018  Mechanism of injury: Pt reports no new changes since his last session of physical therapy  He has been doing exercises at home and has noted improvements continued     Not a recurrent problem   Quality of life: fair    Pain  No pain reported    Social Support  Steps to enter house: yes  2  Stairs in house: yes (landing in the milddle of the flight)   20  Lives in: multiple-level home  Lives with: parents    Employment status: not working (Previously looking for a job and working for [employer])  Hand dominance: right      Diagnostic Tests  MRI studies: abnormal  CT scan: abnormal  Treatments  Current treatment: physical therapy and occupational therapy        Objective  PT/OT Neuro Exam  Neurologic Exam     Dizziness  Current: 2/10  At worst: 8/10   At best: 1/10  Exacerbating factors: rolling over in bed, position changes    Balance Test    6 Minute Walk Test (ft): 1450ft    Gait Speed (ft/s): 3 2 ft/sec   5x Sit To Stand (s): 9 25 sec from purple chair         MCTSIB Number of Seconds   Feet Together, Eyes Open 30   Feet Together, Eyes Closed 30   Feet Together, Eyes Open Foam 30   Feet Together, Eyes Closed Foam 8     Callejas: Indicates no increased risk for falls    Manual Muscle Testing - Hip Left Right   Flexion 5 5   Extension 4 4   Abduction 4 4   External Rotation 4 4     Manual Muscle Testing - Knee Left Right   Flexion 5 5   Extension 5 5     Manual Muscle Testing - Ankle Left Right   Doriflexion 4 4   Plantarflexion 3 3        Transfers    Sit To Stand Independent   W/C To Bed Independent   Sit To Supine Independent   Roll Independent       Gait Assessment:   Decreased trunk rotation, moderate path deviation, wide COLBY, decreased reciprocal arm swing    Mild left facial drooping noted, denies difficulty with swallowing or drinking    VOMS (Vestibular/Ocular Motor Screen)-- blurry vision     Resting position: left lateral      Smooth pursuit Abnormal     Saccades (horizontal) Normal, slowed in all directions     Saccades (vertical)  Normal, slowed in all directions     Convergence: Insufficiency - decreased teaming bilaterally    Cervical ROM: WFL  Alar ligament: Neg  Sharp Lazaro: Neg   Axial compression: Neg  Spurling's: Neg  Modified VBI screen: Mild increase in dizziness with bending over with rotation      Positional testing: Right Left   Oscar jefferson Mild symptoms +   Roll test: - -         Precautions: CVA with visual disturbances, CKD, DM type 1, HTN, falls     Daily Treatment Diary         Exercise Diary  3/23  3/28 4/9  3/14/18  3/16   Bike 10 min, L3  10 min   l2 x10 min  10 min   Treadmill           FTEO    airex 30"x3        FAEC   airex 30"x3        Tandem gait 3 laps   3 laps  Mini //   3 laps  in gonzalez  40'x2, arms out  3 laps   Semi-tandem stance      semi  [id number] EC     HK marches 2# ankle weights 1 lap gonzalez  10ft // bars 4 laps no UE Arms over chest 15x on foam  in gonzalez  40'x2     STS feet staggered 15x L foot back  15x R foot back from orange chair  foam- 30x     3x10   Step taps from foam   20x ea cones   8"  2x30     Rockerboard taps 20xea EC A/P/ lat 15x -   M/L, A/P  1 min ea  40x ea   Heel/toe walking  Gonzalez 1 lap ea  2 laps ea    heel walking/toe walking 3 laps ea    cone taps   20x side stepping   From foam  20xea     Step downs 6"     6" R/L  2x15     fwd/bwd walking with HTs, H/V  1 lap ea  1 lap ea   2x40' each      lunges 10x2 ea  15xea Deferred due to dizziness    3 laps    step ups  6" foam on floor and step  10xea  8" foam 15x ea     foam 2x15 ea

## 2018-06-26 ENCOUNTER — APPOINTMENT (OUTPATIENT)
Dept: LAB | Facility: CLINIC | Age: 33
End: 2018-06-26
Payer: COMMERCIAL

## 2018-06-26 ENCOUNTER — TELEPHONE (OUTPATIENT)
Dept: OTHER | Facility: HOSPITAL | Age: 33
End: 2018-06-26

## 2018-06-26 DIAGNOSIS — E87.5 HYPERKALEMIA: Primary | ICD-10-CM

## 2018-06-26 DIAGNOSIS — N18.4 CKD (CHRONIC KIDNEY DISEASE) STAGE 4, GFR 15-29 ML/MIN (HCC): ICD-10-CM

## 2018-06-26 LAB
ERYTHROCYTE [DISTWIDTH] IN BLOOD BY AUTOMATED COUNT: 13.6 % (ref 11.6–15.1)
HCT VFR BLD AUTO: 24.6 % (ref 36.5–49.3)
HGB BLD-MCNC: 8 G/DL (ref 12–17)
MCH RBC QN AUTO: 29.9 PG (ref 26.8–34.3)
MCHC RBC AUTO-ENTMCNC: 32.5 G/DL (ref 31.4–37.4)
MCV RBC AUTO: 92 FL (ref 82–98)
PLATELET # BLD AUTO: 273 THOUSANDS/UL (ref 149–390)
PMV BLD AUTO: 9.4 FL (ref 8.9–12.7)
RBC # BLD AUTO: 2.68 MILLION/UL (ref 3.88–5.62)
WBC # BLD AUTO: 5.27 THOUSAND/UL (ref 4.31–10.16)

## 2018-06-26 PROCEDURE — 85027 COMPLETE CBC AUTOMATED: CPT

## 2018-06-26 RX ORDER — SODIUM POLYSTYRENE SULFONATE 15 G/60ML
15 SUSPENSION ORAL; RECTAL ONCE
Qty: 120 ML | Refills: 0 | Status: SHIPPED | OUTPATIENT
Start: 2018-06-26 | End: 2018-06-26

## 2018-06-27 ENCOUNTER — TELEPHONE (OUTPATIENT)
Dept: NEPHROLOGY | Facility: CLINIC | Age: 33
End: 2018-06-27

## 2018-06-27 NOTE — TELEPHONE ENCOUNTER
----- Message from [facility] Saldivar sent at 6/26/2018  4:31 PM EDT -----  Regarding: kayexalate  I called patient and asked him to call office in regards to labs  Please let him know his potassium is elevated  I ordered kayexalate to his pharmacy  He should take 15g (60ml) one time  He also should be instructed to eat a low potassium diet  Otherwise, labs look stable  Thanks

## 2018-07-09 ENCOUNTER — APPOINTMENT (OUTPATIENT)
Dept: LAB | Facility: CLINIC | Age: 33
End: 2018-07-09
Payer: COMMERCIAL

## 2018-07-11 ENCOUNTER — OFFICE VISIT (OUTPATIENT)
Dept: NEPHROLOGY | Facility: CLINIC | Age: 33
End: 2018-07-11
Payer: COMMERCIAL

## 2018-07-11 VITALS
SYSTOLIC BLOOD PRESSURE: 172 MMHG | DIASTOLIC BLOOD PRESSURE: 80 MMHG | WEIGHT: 217 LBS | BODY MASS INDEX: 30.38 KG/M2 | HEIGHT: 71 IN | HEART RATE: 78 BPM

## 2018-07-11 DIAGNOSIS — N18.3 ACUTE RENAL FAILURE SUPERIMPOSED ON STAGE 3 CHRONIC KIDNEY DISEASE, UNSPECIFIED ACUTE RENAL FAILURE TYPE: ICD-10-CM

## 2018-07-11 DIAGNOSIS — N18.4 ANEMIA IN STAGE 4 CHRONIC KIDNEY DISEASE (HCC): ICD-10-CM

## 2018-07-11 DIAGNOSIS — N17.9 ACUTE RENAL FAILURE SUPERIMPOSED ON STAGE 3 CHRONIC KIDNEY DISEASE, UNSPECIFIED ACUTE RENAL FAILURE TYPE: ICD-10-CM

## 2018-07-11 DIAGNOSIS — E55.9 VITAMIN D DEFICIENCY: Chronic | ICD-10-CM

## 2018-07-11 DIAGNOSIS — E87.5 HYPERKALEMIA: ICD-10-CM

## 2018-07-11 DIAGNOSIS — N18.4 CKD (CHRONIC KIDNEY DISEASE) STAGE 4, GFR 15-29 ML/MIN (HCC): Primary | ICD-10-CM

## 2018-07-11 DIAGNOSIS — D63.1 ANEMIA IN STAGE 4 CHRONIC KIDNEY DISEASE (HCC): ICD-10-CM

## 2018-07-11 DIAGNOSIS — R80.1 PERSISTENT PROTEINURIA: Chronic | ICD-10-CM

## 2018-07-11 PROCEDURE — 99214 OFFICE O/P EST MOD 30 MIN: CPT | Performed by: NURSE PRACTITIONER

## 2018-07-11 RX ORDER — TORSEMIDE 20 MG/1
20 TABLET ORAL DAILY
Qty: 90 TABLET | Refills: 3 | Status: SHIPPED | OUTPATIENT
Start: 2018-07-11 | End: 2018-07-18 | Stop reason: SDUPTHER

## 2018-07-11 NOTE — PATIENT INSTRUCTIONS
1  Restart torsemide: start at 20 mg daily  May need to increase dose to 40 mg daily if weight does not improve  2  Take 60 mg nifedipine in the Am if systolic blood pressure >382  3  Go for surgical eval for PD catheter placement  4  Blood work in one week   5  Take a dose of kayexelate today  6  NO NSAIDS  7  Follow up visit in 4 weeks  8  Blood work before next visit    Chronic Kidney Disease Diet   AMBULATORY CARE:   A chronic kidney disease diet  limits protein, phosphorus, sodium, and potassium  Liquids may also need to be limited in later stages of chronic kidney disease  This diet can help slow down the rate of damage to your kidneys  Your diet may change over time as your health condition changes  You may also need to make other diet changes if you have other health problems, such as diabetes  Diet changes: There are 5 stages of chronic kidney disease  The diet changes you need to make are based on your stage of kidney disease  Work with your dietitian or healthcare provider to plan meals that are right for you  You may need any of the following:  · Limit protein  in all stages of kidney disease  Limit the portion sizes of protein you eat to limit the amount of work your kidneys have to do  Foods that are high in protein are meat, poultry (chicken and turkey), fish, eggs, and dairy (milk, cheese, yogurt)  Your healthcare provider will tell you how much protein to eat each day  · Limit sodium  if you have high blood pressure  Limit your sodium intake to less than 2,300 milligrams (mg) each day  Ask your dietitian or healthcare provider how much sodium you can have each day  The amount of sodium you should have depends on your stage of kidney disease  Table salt, canned foods, soups, salted snacks, and processed meats, like deli meats and sausage, are high in sodium  · Limit the amount of phosphorus  you eat  Your kidneys cannot get rid of extra phosphorus that builds up in your blood   This may cause your bones to lose calcium and weaken  Foods that are high in phosphorus are dairy products, beans, peas, nuts, and whole grains  Phosphorus is also found in cocoa, beer, and cola drinks  Your healthcare provider will tell you how much phosphorus you can have each day  · Limit potassium  if your potassium blood levels are too high  Your dietitian or healthcare provider will tell you if you need to limit potassium  Potassium is found in fruits and vegetables  · Limit liquids  as directed  Your healthcare provider may recommend that you limit liquids in stages 4 and 5 of kidney disease  If your body retains fluids, you will have swelling and fluid may build up in your lungs  This can cause other health problems, such as shortness of breath  Foods you may include: Your dietitian will tell you how many servings you can have from each of the food groups below  The approximate amount of these nutrients is listed next to each food group  Read the food label to find the exact amount  · Bread, cereal, and grains: These foods contain about 80 calories, 2 grams (g) of protein, 150 mg of sodium, 50 mg of potassium, and 30 mg of phosphorus  ¨ 1 slice (1 ounce) of bread ([place], [place], raisin, light rye, or sourdough white), small dinner roll, or 6-inch tortilla    ¨ ½ of a hamburger bun, hot dog bun, or English muffin or ¼ of a bagel    ¨ 1 cup of unsweetened cereal or ½ cup of cooked cereal, such as cream of wheat    ¨ ? cup of cooked pasta (noodles, macaroni, or spaghetti) or rice    ¨ 4 (2-inch) unsalted crackers or 3 squares of rah crackers    ¨ 3 cups of air-popped, unsalted popcorn    ¨ ¾ ounce of unsalted pretzels    · Vegetables:  A serving of these foods contains about 30 calories, 2 g of protein, 50 mg of sodium, and 50 mg of phosphorus       ¨ Low potassium (less than 150 mg):      ¨ ½ cup cooked green beans, cabbage, cauliflower, beets, or corn    ¨ 1 cup raw cucumber, endive, alfalfa sprouts, cabbage, cauliflower, or watercress    ¨ 1 cup of all types of lettuce    ¨ ¼ cup cooked or ½ cup raw mushrooms or onions    ¨ 1 cup cooked eggplant    ¨ Medium potassium (150 to 250 mg):      ¨ 1 cup raw broccoli, celery, or zucchini    ¨ ½ cup cooked asparagus, broccoli, celery, green peas, summer squash, zucchini, or peppers    ¨ 1 cup cooked kale or turnips    · Fruits:  A serving of these foods contains about 60 calories, 0 g protein, 0 mg sodium, and 150 mg of phosphorus  Each serving is ½ cup, unless another amount is given  ¨ Low potassium (less than 150 mg): ¨ Apple juice, applesauce, or 1 small apple    ¨ Blueberries    ¨ Cranberries or cranberry juice cocktail    ¨ Fresh or canned pears (light syrup or packed in water)    ¨ Grapes or grape juice    ¨ Canned peaches (light syrup or packed in water)    ¨ Pineapple or strawberries    ¨ 1 tangerine     ¨ Watermelon    ¨ Medium potassium (150 to 250 mg):      ¨ Fresh peaches or pears    ¨ Cherries    ¨ Cantaloupe, angela, or papaya    ¨ Grapefruit or grapefruit juice    · Meat, poultry, and fish: These foods have about 75 calories, 7 g of protein, an average of 65 mg of sodium, 115 mg of potassium, and 70 mg of phosphorus  Do not use salt to prepare these foods  ¨ 1 ounce of cooked beef, pork, or poultry    ¨ 1 ounce of any fresh or frozen fish, lobster, shrimp, crab, clams, tuna, unsalted canned salmon, or unsalted sardines    · Other protein foods: These foods have about 90 calories, 7 g of protein, an average of 100 mg of sodium, 100 mg of potassium, and 120 mg of phosphorus  ¨ 1 large whole egg or ¼ cup of low-cholesterol egg substitute    ¨ 1 ounce of cheese    ¨ ¼ cup of cottage cheese or tofu    ¨ 1 ounce of unsalted nuts or 2 tablespoons of peanut butter    · Fats: These foods have very little protein and about 45 calories, 55 milligrams of sodium, 10 milligrams of potassium, and 5 milligrams of phosphorus   Include healthy fats, such as unsaturated fats, which are listed below  ¨ 1 teaspoon margarine or mayonnaise     ¨ 1 teaspoon oil (safflower, sunflower, corn, soybean, olive, peanut, canola)    ¨ 1 tablespoon oil-based salad dressing (such as [place]) or 2 tablespoons mayonnaise-based salad dressing (such as ranch)  Foods to limit or avoid:   · Starches: The following foods have higher amounts of sodium, potassium, or phosphorus  ¨ Biscuits, muffins, pancakes, and waffles     ¨ Cake and cornbread from boxed mixes    ¨ Oatmeal and whole-wheat cereals    ¨ Salted pretzel sticks or rings and sandwich cookies    · Meat and protein foods: The following are high in sodium and phosphorus  ¨ Deli-style meat, such as roast beef, ham, and turkey    ¨ Canned salmon and sardines    ¨ Processed cheese, such as American cheese and cheese spreads    ¨ Smoked or cured meat, such as corned beef, robert, ham, hot dogs, and sausage    · Legumes: These foods have about 90 calories, 6 g of protein, less than 10 mg of sodium, 250 mg of potassium, and 100 mg of phosphorus  ¨ ? cup of black beans, red kidney beans, black-eye peas, garbanzos, and lentils    ¨ ¼ cup of green or mature soybeans    · Dairy: The following foods have about 8 g of protein, an average of 120 mg of sodium, 350 mg of potassium, and 220 mg of phosphorus  ¨ 1 cup of milk (fat-free, low-fat, whole, buttermilk, or chocolate milk)    ¨ 1 cup of low-fat plain or sugar-free yogurt or ice cream    ¨ ½ cup of pudding or custard    ¨ Nondairy milk substitutes: These foods have 75 calories, 1 gram of protein, and an average of 40 mg of sodium, 60 mg of potassium, and 60 mg of phosphorus  A serving is ½ cup of almond, rice, or soy milk, or nondairy creamer  · Vegetables: The following vegetables are high in potassium  Each serving has more than 250 mg of potassium  A serving is ½ cup, unless another amount is given      ¨ Artichoke or ¼ of a medium avocado    ¨ [place] sprouts, beets, chard, shorty or mustard greens    ¨ Potatoes, sweet potatoes, pumpkin, and yams    ¨ ¾ cup of okra    ¨ Raw tomatoes and low-sodium tomato juice, or tomato sauce    ¨ Winter squash, cooked asparagus, and cooked spinach    · Fruit:  The following fruits are high in potassium  Each serving has more than 250 mg of potassium  ¨ 3 fresh apricots    ¨ 1 small nectarine (2 inches across)    ¨ 1 small orange and ½ cup of orange juice    ¨ ¼ cup of dates     ¨ ? of a small honeydew melon    ¨ 1 six-inch banana     ¨ ½ cup of prune juice or prunes and kiwifruit    · Fats:  Limit unhealthy fats, such as saturated fats, which are listed below  ¨ Butter, lard, cream cheese, whipped cream, and sour cream    ¨ Powdered coffee creamer    · Other: The following foods are high in sodium  ¨ Frozen dinners, soups, and fast foods, such as hamburgers and pizza (see the food label for serving sizes)    ¨ Table salt and seasoned salts, such as onion or garlic salt    ¨ Barbecue sauce, ketchup, mustard, soy sauce, steak sauce, and teriyaki sauce  Contact your healthcare provider if:   · You are gaining or losing weight very quickly  · You have shortness of breath  · You have nausea and are vomiting  · You feel very weak and tired  · You are having trouble following the chronic kidney disease diet  © 2017 [employer] Information is for End User's use only and may not be sold, redistributed or otherwise used for commercial purposes  All illustrations and images included in CareNotes® are the copyrighted property of [employer]  or Rickie Carcamo  The above information is an  only  It is not intended as medical advice for individual conditions or treatments  Talk to your doctor, nurse or pharmacist before following any medical regimen to see if it is safe and effective for you

## 2018-07-11 NOTE — PROGRESS NOTES
NEPHROLOGY OFFICE VISIT   Devonte Mckeon 27 y o  male MRN: [id number]  7/11/2018    Reason for Visit:  Follow-up for management of chronic kidney disease and hypertension  NEPHROLOGY OFFICE VISIT   Devonte Mckeon 27 y o  male MRN: [id number]  7/6/2018     Reason for Visit:  Follow-up       ASSESSMENT and PLAN:     I had the pleasure of seeing Machelle Walker today in the renal clinic for the continued management of chronic kidney disease and hypertension  He was last seen in the nephrology clinic on 06/14/2018  During that visit it was established that renal function continue to worsen with creatinine plateauing at 5 6 although Machelle Walker did not display any overt uremic signs or symptoms  We discussed modality choices at last office visit  Blood pressure remains difficult to control-he has gone through multiple medication changes over the last few months  Most recently torsemide was discontinued and patient was instructed to take it per weight gain/edema  He has gained approximately 4 lb in appears to have some facial puffiness  Blood pressure has worsened  His appetite remains intact  No bad taste in his mouth  Unfortunately, potassium level increased to 5 6 on 6/26-  One dose of Kayexalate was given  Follow-up potassium 5 5  Machelle Walker has a history of diabetes mellitus type 1, CKD, CVA, hyperhomocystinemia who was previously managed by Dr Ranjan Crane but lost to follow-up  The first CVA, which was July 2015, 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  Machelle Walker was found to be 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 and also heterozygote for prothrombin gene mutation  He was hospitalized with In February with acute CVA,  significant acute kidney injury on CKD  He underwent a course of plasmapheresis due to concerns for a demyelinating disorder     Unfortunately follow-up blood work shows worsening creatinine; on 07/09 creatinine 6 38 with a GFR of 8   Elisabet Casanova has decided to go on peritoneal dialysis therefore referral placed to surgery  ASSESSMENT and PLAN:   1  Chronic kidney disease stage IV-V: Former baseline creatinine 2 3-2 5  Progressive worsening of renal function  Now creatinine has increased into the mid 5s in June and is currently 6 38 on 07/09/2018  No overt uremic signs and symptoms  · CKD etiology likely hypertensive nephrosclerosis, diabetic nephropathy  · Previous attended [facility] class  · Elisabet Casanova would like to pursue peritoneal dialysis  · Plan on referral to surgery, placement of peritoneal dialysis catheter  · Elisabet Casanova also has an appointment with Dr Chinmay Blanton next week to discuss transplant option  · Follow-up in the Nephrology Clinic next month  · Blood work next week and before next appointment  2  Nephrotic range proteinuria: Microalbumin creatinine ratio 9281 mg/g in January 2018   Diabetic nephropathy suspected   More recently urine protein creatinine ratio 4 4  3  Hypertension, volume status:  Weight gain 4 lb  · Antihypertensives- labetalol t i d  -150 mg in the morning and in the afternoon and 300mg hs, chlorthalidone 50mg daily, hydralazine 100mg TID, nifedipine 30 mg in the Am and 60 mg, (June 14th torsemide d/yolanda)  Restart torsemide 20 mg daily  Clonidine 1 dose HS due to high AM readings  · If a m  blood pressure readings greater than 180 mmHg, patient instructed to take 60 mg of nifedipine  · Renal artery doppler evaluation-no evidence of renovascular disease on the right, unable to visualize left renal artery due to excessive bowel gas  · Previous workup- Aldosterone level less than 1 0, renin 0 28 if a m  blood pressure readings 8- PAC/PRA ratio 3 47   Which does not support primary aldosteronism  Metanephrine level within normal limits 36,  normetanephrine level elevated 177  TSH normal  4   Hyperkalemia:    · Potassium level recently increased likely due to torsemide being recently stopped   · Will give 1 dose of Kayexalate today   · Restart torsemide   · Check labs next week    · On a low-potassium diet  5  Anemia:  No HETAL due to CVA  Iron saturation 28%, ferritin level 222  · Last hemoglobin 8 0  · Recheck CBC before next appointment  6  Recent CVA left pontine, history of lacunar CVA  7  Hyperhomocystinemia:  Hematology follow-up  8  CKD MBD:    · Vitamin-D level 24 9 in April    6  · Patient is on vitamin D3 2000 units per day and ergo calciferol 16070 units monthly  · Check PTH before next appointment  This was a lengthy appointment due to discussion regarding modality and diet  PATIENT INSTRUCTIONS:    Patient Instructions   1  Restart torsemide: start at 20 mg daily  May need to increase dose to 40 mg daily if weight does not improve  2  Take 60 mg nifedipine in the Am if systolic blood pressure >566  3  Go for surgical eval for PD catheter placement  4  Blood work in one week   5  Take a dose of kayexelate today  6  NO NSAIDS  7  Follow up visit in 4 weeks  8  Blood work before next visit    Chronic Kidney Disease Diet   AMBULATORY CARE:   A chronic kidney disease diet  limits protein, phosphorus, sodium, and potassium  Liquids may also need to be limited in later stages of chronic kidney disease  This diet can help slow down the rate of damage to your kidneys  Your diet may change over time as your health condition changes  You may also need to make other diet changes if you have other health problems, such as diabetes  Diet changes: There are 5 stages of chronic kidney disease  The diet changes you need to make are based on your stage of kidney disease  Work with your dietitian or healthcare provider to plan meals that are right for you  You may need any of the following:  · Limit protein  in all stages of kidney disease  Limit the portion sizes of protein you eat to limit the amount of work your kidneys have to do   Foods that are high in protein are meat, poultry (chicken and turkey), fish, eggs, and dairy (milk, cheese, yogurt)  Your healthcare provider will tell you how much protein to eat each day  · Limit sodium  if you have high blood pressure  Limit your sodium intake to less than 2,300 milligrams (mg) each day  Ask your dietitian or healthcare provider how much sodium you can have each day  The amount of sodium you should have depends on your stage of kidney disease  Table salt, canned foods, soups, salted snacks, and processed meats, like deli meats and sausage, are high in sodium  · Limit the amount of phosphorus  you eat  Your kidneys cannot get rid of extra phosphorus that builds up in your blood  This may cause your bones to lose calcium and weaken  Foods that are high in phosphorus are dairy products, beans, peas, nuts, and whole grains  Phosphorus is also found in cocoa, beer, and cola drinks  Your healthcare provider will tell you how much phosphorus you can have each day  · Limit potassium  if your potassium blood levels are too high  Your dietitian or healthcare provider will tell you if you need to limit potassium  Potassium is found in fruits and vegetables  · Limit liquids  as directed  Your healthcare provider may recommend that you limit liquids in stages 4 and 5 of kidney disease  If your body retains fluids, you will have swelling and fluid may build up in your lungs  This can cause other health problems, such as shortness of breath  Foods you may include: Your dietitian will tell you how many servings you can have from each of the food groups below  The approximate amount of these nutrients is listed next to each food group  Read the food label to find the exact amount  · Bread, cereal, and grains: These foods contain about 80 calories, 2 grams (g) of protein, 150 mg of sodium, 50 mg of potassium, and 30 mg of phosphorus      ¨ 1 slice (1 ounce) of bread ([place], [place], raisin, light rye, or sourdough white), small dinner roll, or 6-inch tortilla    ¨ ½ of a hamburger bun, hot dog bun, or English muffin or ¼ of a bagel    ¨ 1 cup of unsweetened cereal or ½ cup of cooked cereal, such as cream of wheat    ¨ ? cup of cooked pasta (noodles, macaroni, or spaghetti) or rice    ¨ 4 (2-inch) unsalted crackers or 3 squares of rah crackers    ¨ 3 cups of air-popped, unsalted popcorn    ¨ ¾ ounce of unsalted pretzels    · Vegetables:  A serving of these foods contains about 30 calories, 2 g of protein, 50 mg of sodium, and 50 mg of phosphorus  ¨ Low potassium (less than 150 mg):      ¨ ½ cup cooked green beans, cabbage, cauliflower, beets, or corn    ¨ 1 cup raw cucumber, endive, alfalfa sprouts, cabbage, cauliflower, or watercress    ¨ 1 cup of all types of lettuce    ¨ ¼ cup cooked or ½ cup raw mushrooms or onions    ¨ 1 cup cooked eggplant    ¨ Medium potassium (150 to 250 mg):      ¨ 1 cup raw broccoli, celery, or zucchini    ¨ ½ cup cooked asparagus, broccoli, celery, green peas, summer squash, zucchini, or peppers    ¨ 1 cup cooked kale or turnips    · Fruits:  A serving of these foods contains about 60 calories, 0 g protein, 0 mg sodium, and 150 mg of phosphorus  Each serving is ½ cup, unless another amount is given  ¨ Low potassium (less than 150 mg): ¨ Apple juice, applesauce, or 1 small apple    ¨ Blueberries    ¨ Cranberries or cranberry juice cocktail    ¨ Fresh or canned pears (light syrup or packed in water)    ¨ Grapes or grape juice    ¨ Canned peaches (light syrup or packed in water)    ¨ Pineapple or strawberries    ¨ 1 tangerine     ¨ Watermelon    ¨ Medium potassium (150 to 250 mg):      ¨ Fresh peaches or pears    ¨ Cherries    ¨ Cantaloupe, angela, or papaya    ¨ Grapefruit or grapefruit juice    · Meat, poultry, and fish: These foods have about 75 calories, 7 g of protein, an average of 65 mg of sodium, 115 mg of potassium, and 70 mg of phosphorus  Do not use salt to prepare these foods       ¨ 1 ounce of cooked beef, pork, or poultry    ¨ 1 ounce of any fresh or frozen fish, lobster, shrimp, crab, clams, tuna, unsalted canned salmon, or unsalted sardines    · Other protein foods: These foods have about 90 calories, 7 g of protein, an average of 100 mg of sodium, 100 mg of potassium, and 120 mg of phosphorus  ¨ 1 large whole egg or ¼ cup of low-cholesterol egg substitute    ¨ 1 ounce of cheese    ¨ ¼ cup of cottage cheese or tofu    ¨ 1 ounce of unsalted nuts or 2 tablespoons of peanut butter    · Fats: These foods have very little protein and about 45 calories, 55 milligrams of sodium, 10 milligrams of potassium, and 5 milligrams of phosphorus  Include healthy fats, such as unsaturated fats, which are listed below  ¨ 1 teaspoon margarine or mayonnaise     ¨ 1 teaspoon oil (safflower, sunflower, corn, soybean, olive, peanut, canola)    ¨ 1 tablespoon oil-based salad dressing (such as [place]) or 2 tablespoons mayonnaise-based salad dressing (such as ranch)  Foods to limit or avoid:   · Starches: The following foods have higher amounts of sodium, potassium, or phosphorus  ¨ Biscuits, muffins, pancakes, and waffles     ¨ Cake and cornbread from boxed mixes    ¨ Oatmeal and whole-wheat cereals    ¨ Salted pretzel sticks or rings and sandwich cookies    · Meat and protein foods: The following are high in sodium and phosphorus  ¨ Deli-style meat, such as roast beef, ham, and turkey    ¨ Canned salmon and sardines    ¨ Processed cheese, such as American cheese and cheese spreads    ¨ Smoked or cured meat, such as corned beef, robert, ham, hot dogs, and sausage    · Legumes: These foods have about 90 calories, 6 g of protein, less than 10 mg of sodium, 250 mg of potassium, and 100 mg of phosphorus  ¨ ? cup of black beans, red kidney beans, black-eye peas, garbanzos, and lentils    ¨ ¼ cup of green or mature soybeans    · Dairy:   The following foods have about 8 g of protein, an average of 120 mg of sodium, 350 mg of potassium, and 220 mg of phosphorus  ¨ 1 cup of milk (fat-free, low-fat, whole, buttermilk, or chocolate milk)    ¨ 1 cup of low-fat plain or sugar-free yogurt or ice cream    ¨ ½ cup of pudding or custard    ¨ Nondairy milk substitutes: These foods have 75 calories, 1 gram of protein, and an average of 40 mg of sodium, 60 mg of potassium, and 60 mg of phosphorus  A serving is ½ cup of almond, rice, or soy milk, or nondairy creamer  · Vegetables: The following vegetables are high in potassium  Each serving has more than 250 mg of potassium  A serving is ½ cup, unless another amount is given  ¨ Artichoke or ¼ of a medium avocado    ¨ [place] sprouts, beets, chard, shorty or mustard greens    ¨ Potatoes, sweet potatoes, pumpkin, and yams    ¨ ¾ cup of okra    ¨ Raw tomatoes and low-sodium tomato juice, or tomato sauce    ¨ Winter squash, cooked asparagus, and cooked spinach    · Fruit:  The following fruits are high in potassium  Each serving has more than 250 mg of potassium  ¨ 3 fresh apricots    ¨ 1 small nectarine (2 inches across)    ¨ 1 small orange and ½ cup of orange juice    ¨ ¼ cup of dates     ¨ ? of a small honeydew melon    ¨ 1 six-inch banana     ¨ ½ cup of prune juice or prunes and kiwifruit    · Fats:  Limit unhealthy fats, such as saturated fats, which are listed below  ¨ Butter, lard, cream cheese, whipped cream, and sour cream    ¨ Powdered coffee creamer    · Other: The following foods are high in sodium  ¨ Frozen dinners, soups, and fast foods, such as hamburgers and pizza (see the food label for serving sizes)    ¨ Table salt and seasoned salts, such as onion or garlic salt    ¨ Barbecue sauce, ketchup, mustard, soy sauce, steak sauce, and teriyaki sauce  Contact your healthcare provider if:   · You are gaining or losing weight very quickly  · You have shortness of breath  · You have nausea and are vomiting  · You feel very weak and tired      · You are having trouble following the chronic kidney disease diet  © 2017 [employer]  Information is for End User's use only and may not be sold, redistributed or otherwise used for commercial purposes  All illustrations and images included in CareNotes® are the copyrighted property of [employer]  or Rickie Carcamo  The above information is an  only  It is not intended as medical advice for individual conditions or treatments  Talk to your doctor, nurse or pharmacist before following any medical regimen to see if it is safe and effective for you  Orders Placed This Encounter   Procedures    Basic metabolic panel     This is a patient instruction: Patient fasting for 8 hours or longer recommended  Standing Status:   Future     Standing Expiration Date:   10/11/2018    Renal function panel     Standing Status:   Future     Standing Expiration Date:   7/11/2019       OBJECTIVE:  Current Weight: Weight - Scale: 98 4 kg (217 lb)  Vitals:    07/11/18 1117 07/11/18 1203   BP:  (!) 172/80   BP Location:  Left arm   Patient Position:  Sitting   Cuff Size:  Standard   Pulse:  78   Weight: 98 4 kg (217 lb)    Height: 5' 11" (1 803 m)     Body mass index is 30 27 kg/m²  REVIEW OF SYSTEMS:    Review of Systems   Constitutional: Positive for unexpected weight change (4 lb weight gain)  Negative for activity change, appetite change and fever  HENT: Negative  Eyes: Negative  Respiratory: Negative  Cardiovascular: Positive for leg swelling ( mild leg edema)  Gastrointestinal: Negative  Genitourinary: Negative  Musculoskeletal: Negative  Neurological: Negative  Psychiatric/Behavioral: Negative  PHYSICAL EXAM:      Physical Exam   Constitutional: He appears well-developed and well-nourished  No distress  HENT:   Head: Normocephalic and atraumatic  Mouth/Throat: Oropharynx is clear and moist    Eyes: Conjunctivae are normal  Right eye exhibits no discharge   Left eye exhibits no discharge  No scleral icterus  Neck: Neck supple  No JVD present  Cardiovascular: Normal rate, regular rhythm and normal heart sounds  Exam reveals no gallop and no friction rub  No murmur heard  Pulmonary/Chest: Effort normal and breath sounds normal  No respiratory distress  He has no wheezes  He has no rales  Abdominal: Soft  Bowel sounds are normal  He exhibits no distension  There is no tenderness  There is no rebound and no guarding  Musculoskeletal: He exhibits edema (Slight facial puffiness, lower extremities edematous, nonpitting)  Neurological: He is alert  Skin: Skin is warm and dry  No rash noted  No erythema  Psychiatric: He has a normal mood and affect   His behavior is normal  Judgment and thought content normal        Medications:    Current Outpatient Prescriptions:     aspirin (ECOTRIN LOW STRENGTH) 81 mg EC tablet, Take 81 mg by mouth daily, Disp: , Rfl:     atorvastatin (LIPITOR) 40 mg tablet, Take 1 tablet (40 mg total) by mouth every evening, Disp: 30 tablet, Rfl: 5    B-D ULTRAFINE III SHORT PEN 31G X 8 MM MISC, Inject under the skin 4 (four) times a day, Disp: 400 each, Rfl: 5    chlorthalidone (HYGROTEN) 50 MG tablet, Take 1 tablet (50 mg total) by mouth daily, Disp: 30 tablet, Rfl: 0    Cholecalciferol (VITAMIN D) 2000 units CAPS, Take 1 capsule by mouth daily, Disp: , Rfl:     cloNIDine (CATAPRES) 0 1 mg tablet, Take 0 1 mg by mouth daily, Disp: , Rfl:     clopidogrel (PLAVIX) 75 mg tablet, Take 1 tablet (75 mg total) by mouth daily, Disp: 30 tablet, Rfl: 5    ergocalciferol (ERGOCALCIFEROL) 23227 units capsule, Take 1 capsule (50,000 Units total) by mouth every 30 (thirty) days for 12 days, Disp: 12 capsule, Rfl: 0    escitalopram (LEXAPRO) 10 mg tablet, Take 1 tablet (10 mg total) by mouth daily, Disp: 30 tablet, Rfl: 5    folic acid (FOLVITE) 1 mg tablet, Take 1 tablet (1 mg total) by mouth daily, Disp: 30 tablet, Rfl: 5    hydrALAZINE (APRESOLINE) 100 MG tablet, Take 1 tablet (100 mg total) by mouth 3 (three) times a day, Disp: 270 tablet, Rfl: 3    insulin glargine (LANTUS) 100 units/mL subcutaneous injection, Inject 8 Units under the skin daily with breakfast (Patient taking differently: Inject 10 Units under the skin 2 (two) times a day  ), Disp: 10 mL, Rfl: 0    insulin lispro (HumaLOG) 100 units/mL injection, Inject 5 Units under the skin 3 (three) times a day with meals U-100 pen, Disp: 10 mL, Rfl: 2    labetalol (NORMODYNE) 300 mg tablet, Take 0 5 tablets (150 mg total) by mouth every 8 (eight) hours (Patient taking differently: Take 300 mg by mouth 2 (two) times a day Take 150 mg  In AM, 150 mg  At 2:00 PM and 300 mg  At 10:00 PM ), Disp: 60 tablet, Rfl: 3    NIFEdipine ER (ADALAT CC) 60 MG 24 hr tablet, Take 1 tablet (60 mg total) by mouth every evening Take 30 mg   In AM, take 60 mg  @ 4PM, Disp: 30 tablet, Rfl: 5    ondansetron (ZOFRAN) 4 mg tablet, Take 1 tablet (4 mg total) by mouth every 8 (eight) hours as needed for nausea or vomiting, Disp: 20 tablet, Rfl: 0    sodium bicarbonate 650 mg tablet, Take 1 tablet (650 mg total) by mouth 4 (four) times a day, Disp: 120 tablet, Rfl: 6    torsemide (DEMADEX) 20 mg tablet, Take 1 tablet (20 mg total) by mouth daily Take an extra dose in the afternoon for three days then daily, Disp: 90 tablet, Rfl: 3    Laboratory Results:    Results from last 7 days  Lab Units 07/09/18  1000   SODIUM mmol/L 145   POTASSIUM mmol/L 5 5*   CHLORIDE mmol/L 109*   CO2 mmol/L 23   BUN mg/dL 65*   CREATININE mg/dL 6 38*   CALCIUM mg/dL 8 8   GLUCOSE RANDOM mg/dL 89       Results for orders placed or performed in visit on 70/32/33   Basic metabolic panel   Result Value Ref Range    Sodium 145 136 - 145 mmol/L    Potassium 5 5 (H) 3 5 - 5 3 mmol/L    Chloride 109 (H) 100 - 108 mmol/L    CO2 23 21 - 32 mmol/L    Anion Gap 13 4 - 13 mmol/L    BUN 65 (H) 5 - 25 mg/dL    Creatinine 6 38 (H) 0 60 - 1 30 mg/dL Glucose 89 65 - 140 mg/dL    Calcium 8 8 8 3 - 10 1 mg/dL    eGFR 10 ml/min/1 73sq m

## 2018-07-16 ENCOUNTER — APPOINTMENT (OUTPATIENT)
Dept: LAB | Facility: CLINIC | Age: 33
End: 2018-07-16
Payer: COMMERCIAL

## 2018-07-16 DIAGNOSIS — N18.4 CKD (CHRONIC KIDNEY DISEASE) STAGE 4, GFR 15-29 ML/MIN (HCC): ICD-10-CM

## 2018-07-16 LAB
ANION GAP SERPL CALCULATED.3IONS-SCNC: 10 MMOL/L (ref 4–13)
BUN SERPL-MCNC: 63 MG/DL (ref 5–25)
CALCIUM SERPL-MCNC: 8.9 MG/DL (ref 8.3–10.1)
CHLORIDE SERPL-SCNC: 108 MMOL/L (ref 100–108)
CO2 SERPL-SCNC: 25 MMOL/L (ref 21–32)
CREAT SERPL-MCNC: 6.41 MG/DL (ref 0.6–1.3)
ERYTHROCYTE [DISTWIDTH] IN BLOOD BY AUTOMATED COUNT: 13.3 % (ref 11.6–15.1)
GFR SERPL CREATININE-BSD FRML MDRD: 10 ML/MIN/1.73SQ M
GLUCOSE SERPL-MCNC: 112 MG/DL (ref 65–140)
HCT VFR BLD AUTO: 23.9 % (ref 36.5–49.3)
HGB BLD-MCNC: 7.8 G/DL (ref 12–17)
MCH RBC QN AUTO: 29.4 PG (ref 26.8–34.3)
MCHC RBC AUTO-ENTMCNC: 32.6 G/DL (ref 31.4–37.4)
MCV RBC AUTO: 90 FL (ref 82–98)
PLATELET # BLD AUTO: 243 THOUSANDS/UL (ref 149–390)
PMV BLD AUTO: 9 FL (ref 8.9–12.7)
POTASSIUM SERPL-SCNC: 5 MMOL/L (ref 3.5–5.3)
RBC # BLD AUTO: 2.65 MILLION/UL (ref 3.88–5.62)
SODIUM SERPL-SCNC: 143 MMOL/L (ref 136–145)
WBC # BLD AUTO: 5.71 THOUSAND/UL (ref 4.31–10.16)

## 2018-07-16 PROCEDURE — 80048 BASIC METABOLIC PNL TOTAL CA: CPT

## 2018-07-16 PROCEDURE — 36415 COLL VENOUS BLD VENIPUNCTURE: CPT

## 2018-07-16 PROCEDURE — 85027 COMPLETE CBC AUTOMATED: CPT

## 2018-07-18 ENCOUNTER — OFFICE VISIT (OUTPATIENT)
Dept: NEPHROLOGY | Facility: CLINIC | Age: 33
End: 2018-07-18
Payer: COMMERCIAL

## 2018-07-18 VITALS
DIASTOLIC BLOOD PRESSURE: 70 MMHG | HEIGHT: 70 IN | SYSTOLIC BLOOD PRESSURE: 180 MMHG | HEART RATE: 76 BPM | WEIGHT: 216.8 LBS | BODY MASS INDEX: 31.04 KG/M2

## 2018-07-18 DIAGNOSIS — N18.3 ACUTE RENAL FAILURE SUPERIMPOSED ON STAGE 3 CHRONIC KIDNEY DISEASE, UNSPECIFIED ACUTE RENAL FAILURE TYPE: ICD-10-CM

## 2018-07-18 DIAGNOSIS — N17.9 ACUTE RENAL FAILURE SUPERIMPOSED ON STAGE 3 CHRONIC KIDNEY DISEASE, UNSPECIFIED ACUTE RENAL FAILURE TYPE: ICD-10-CM

## 2018-07-18 DIAGNOSIS — I10 ACCELERATED HYPERTENSION: ICD-10-CM

## 2018-07-18 DIAGNOSIS — N18.4 CHRONIC KIDNEY DISEASE, STAGE 4 (SEVERE) (HCC): Chronic | ICD-10-CM

## 2018-07-18 PROCEDURE — 99215 OFFICE O/P EST HI 40 MIN: CPT | Performed by: INTERNAL MEDICINE

## 2018-07-18 RX ORDER — TORSEMIDE 20 MG/1
20 TABLET ORAL
Qty: 90 TABLET | Refills: 0 | Status: SHIPPED | OUTPATIENT
Start: 2018-07-18 | End: 2018-09-17 | Stop reason: HOSPADM

## 2018-07-18 RX ORDER — NIFEDIPINE 60 MG/1
60 TABLET, FILM COATED, EXTENDED RELEASE ORAL
Qty: 30 TABLET | Refills: 0 | Status: SHIPPED | OUTPATIENT
Start: 2018-07-18 | End: 2018-08-24 | Stop reason: SDUPTHER

## 2018-07-18 NOTE — LETTER
July 18, 2018     Ervin Coon MD  [address] [facility] [place]    [address] [address]    Patient: Elvia Powell   YOB: 1985   Date of Visit: 7/18/2018       Dear Dr Maciel Kathleen:    Thank you for referring Leopoldo Jacqueline to me for evaluation  Below are my notes for this consultation  If you have questions, please do not hesitate to call me  I look forward to following your patient along with you  Sincerely,        Joelle Pang MD        CC: No Recipients  Joelle Pang MD  7/18/2018  4:20 PM  Sign at close encounter  Dov Verdugo is a 27 y o  male  male referred by Dr Claudio Ambriz seen in the Nephrology Clinic for pre-transplant kidney evaluation  Patient has a PMHx of CKD, felt to be secondary to hypertension (diagnosed 3-4 years ago) and diabetes (diagnosed at 1years old, type I), CVA (first CVA 1, then again January 2018), type 1 diabetes, HTN, smoker, hyperlipidemia, smoker  SANJEEV in 4/2018 - EF 60%, no thrombus noted, no foramen ovale    Now has loop recorder    MRI brain -new area of T2 hyperintensity in the right middle and right inferior cerebellar peduncle with resolution of a focus of hyperintensity in the right subinsular region  Suggesting dissemination in time and space  There is concern for demyelinating disease  Renal Doppler study with no evidence of occlusive disease bilaterally  There is possible intrinsic parenchymal disease on the left kidney  Left renal artery was not able to be evaluated due to bowel gas  Plan   1  Will present the patient's case the transplant committee meeting  Patient does have risk factors including poor balance due to prior stroke versus vision disturbances, unclear neurological issues at this juncture, heterozygote for prothrombin gene mutation, longstanding diabetes, uncontrolled hypertension  Given these factors, still would want to evaluate further given the patient's young age    2  The need to avoid blood transfusion to decrease allosensitization was explained to the patient  3  The advantages of a living donor over a  donor was explained to the patient and family  I strongly encouraged the patient and family to pursue a living donor  4   Patient has a history of lacunar infarct and recurrent stroke,  Also has 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 and underwent plasma exchange prior  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 heterozygote for prothrombin gene mutation  Patient follows with hematology and neurology  On plavix and aspirin  We will need to discuss further with both teams to determine the exact etiology  Patient is being referred to a stroke specialist by the primary neurologist and this will happen in   DM type I - uses 50 units insulin daily  6  Shingles episode 6-7 years ago  7  HTN - BP at home is 160-170s; in the AM is higher at times; is taking labetalol 150, 150, 300, hydralazine 100 TID; clonidine 0 1 mg HS, chlorthalidone 50 mg daily, torsemide  I will reach out to the primary nephrologist as the blood pressures are still elevated today  Consideration could be for holding chlorthalidone and increasing torsemide  I have discussed with Terrence Galeana and family at length about the risk and benefits of kidney transplantation  I have strongly encouraged him to pursue living donation, and explained to him the benefits of living donation over  donor transplantation  We have briefly discussed the surgical procedure  We have discussed about the need for life long immunosuppression and the importance of compliance  We have discussed the side effects of immunosuppression including but not limited to infection, malignancies and developing/worsening diabetes control  Terrence Galeana verbalized understanding and is interested in pursuing transplant      During this visit, I spent 60 minutes with the patient; more than 50% of the time I counseled the patient regarding the risks and benefits of kidney transplantation, the immediate and long-term complications of kidney transplantation, and the advantage of receiving a living donor kidney transplant  It was a pleasure evaluating your patient in the office today  Thank you for allowing our team to participate in the care of Mr Melani Shelley  Please do not hesitate to contact our team if further issues/questions shall arise in the interim  HISTORY OF PRESENT ILLNESS    Melani Shelley is a 27 y o  male seen in the Nephrology Clinic for evaluation for kidney transplant  CKD IV due to diabetes/HTN, not on dialysis, S Cr 6 4mg/dl, eGFR 10 ml/min  Renal disease is not biopsy proven  Primary Nephrologist is Dr Aruna Green     Native Urine Output: yes  Hx of voiding difficulty: no  Hx of hematuria: no  Hx of proteinuria: yes  Hx of nephrolithiasis: no  Hx of recurrent urinary tract infection: no  HTN: onset 3-4 years ago,  hx of malignant HTN: yes, admission for HTN emergencies: yes  DM type 3 onset 1years old  Total insulin requirement/day 50 units  DM retinopathy: no, Laser Surgery: no  DM neuropathy: yes  DM gastroparesis: no  DKA: as a child  Hypoglycemic awareness: no  Hx of amputation: no   PAD/PVD: no, Claudication: no  Cardiac history  CAD: no, MI: no   Primary Cardiologist: Dr Higinio King  Exercise tolerance: does walking and PT when able to  Has balance issues with CVA prior  Hx of CVA: yes  Hx of DVT/PE: no  Viral Infection:  HIV: no  Hep B: no  Hep C: no  Cancer: History of cancer: no  Health maintenance and other pertinent history:    Colonoscopy: no  Prostate: no    Blood transfusion: yes    Last admission February - with high BP    Review Of Systems:     Constitutional: decreasing appetite  no fevers, chills, involuntary weight gain or weight loss  Eyes: vision disturbances  ENT: no ear disease, epistaxis or oral ulcers    Respiratory: no SOB  Cardiovascular: no CP, palpitations, claudication, edema  GI: no N/V/D/C, abdominal pain, melena, BRBPR  : see HPI  Endocrine: no thyroid disease  Heme: prior CVA X 2  MS: no joint effusions or deformities    Skin: no skin disease  Neuro: + prior CVA  Psych: no depression, anxiety    Past Medical History:   Diagnosis Date    [facility] [address] [facility]     Cerebellar stroke side undetermined 2015 2013    Diabetes type 1, controlled ([facility] [address] [facility]     Hypertension      Past Surgical History:   Procedure Laterality Date    TONSILLECTOMY        Awaiting PD catheter placement   Abdominal Surgeries: no    Family History   Problem Relation Age of Onset    Breast cancer Mother     Hypertension Mother     Hyperlipidemia Father     Hypertension Father     Leukemia Maternal Grandmother     Hyperlipidemia Maternal Grandfather     Hypertension Maternal Grandfather     Hyperlipidemia Paternal Grandmother     Hypertension Paternal Grandmother     Heart disease Paternal Grandfather         cardiac disorder    Diabetes Paternal Grandfather      Social History     Social History    Marital status: Single     Spouse name: N/A    Number of children: N/A    Years of education: N/A     Occupational History          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     Social History Main Topics    Smoking status: Former Smoker     Packs/day: 0 50     Years: 12 00     Types: Cigarettes    Smokeless tobacco: Never Used      Comment: quit 2/2018    Alcohol use Yes      Comment: socially    Drug use: Yes     Frequency: 3 0 times per week     Types: Marijuana      Comment: weekly    Sexual activity: Not Asked     Other Topics Concern    None     Social History Narrative    Caffeine use    single     Lives with mother and father  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  Living donors: has not discussed as of yet   Current Outpatient Prescriptions   Medication Sig Dispense Refill    aspirin (ECOTRIN LOW STRENGTH) 81 mg EC tablet Take 81 mg by mouth daily      atorvastatin (LIPITOR) 40 mg tablet Take 1 tablet (40 mg total) by mouth every evening 30 tablet 5    B-D ULTRAFINE III SHORT PEN 31G X 8 MM MISC Inject under the skin 4 (four) times a day 400 each 5    chlorthalidone (HYGROTEN) 50 MG tablet Take 1 tablet (50 mg total) by mouth daily 30 tablet 0    Cholecalciferol (VITAMIN D) 2000 units CAPS Take 1 capsule by mouth daily      cloNIDine (CATAPRES) 0 1 mg tablet Take 0 1 mg by mouth daily      clopidogrel (PLAVIX) 75 mg tablet Take 1 tablet (75 mg total) by mouth daily 30 tablet 5    escitalopram (LEXAPRO) 10 mg tablet Take 1 tablet (10 mg total) by mouth daily 30 tablet 5    folic acid (FOLVITE) 1 mg tablet Take 1 tablet (1 mg total) by mouth daily 30 tablet 5    hydrALAZINE (APRESOLINE) 100 MG tablet Take 1 tablet (100 mg total) by mouth 3 (three) times a day 270 tablet 3    insulin glargine (LANTUS) 100 units/mL subcutaneous injection Inject 8 Units under the skin daily with breakfast (Patient taking differently: Inject 10 Units under the skin 2 (two) times a day  ) 10 mL 0    insulin lispro (HumaLOG) 100 units/mL injection Inject 5 Units under the skin 3 (three) times a day with meals U-100 pen 10 mL 2    labetalol (NORMODYNE) 300 mg tablet Take 0 5 tablets (150 mg total) by mouth every 8 (eight) hours (Patient taking differently: Take 300 mg by mouth 2 (two) times a day Take 150 mg  In AM, 150 mg  At 2:00 PM and 300 mg  At 10:00 PM ) 60 tablet 3    NIFEdipine ER (ADALAT CC) 60 MG 24 hr tablet Take 1 tablet (60 mg total) by mouth every evening Take 30 mg   In AM, take 60 mg  @ 4PM 30 tablet 5    ondansetron (ZOFRAN) 4 mg tablet Take 1 tablet (4 mg total) by mouth every 8 (eight) hours as needed for nausea or vomiting 20 tablet 0    sodium bicarbonate 650 mg tablet Take 1 tablet (650 mg total) by mouth 4 (four) times a day 120 tablet 6    torsemide (DEMADEX) 20 mg tablet Take 1 tablet (20 mg total) by mouth daily Take an extra dose in the afternoon for three days then daily 90 tablet 3    ergocalciferol (ERGOCALCIFEROL) 79941 units capsule Take 1 capsule (50,000 Units total) by mouth every 30 (thirty) days for 12 days 12 capsule 0     No current facility-administered medications for this visit        Sulfa antibiotics  Physical Exam:  BP (!) 180/70   Pulse 76   Ht 5' 10 25" (1 784 m)   Wt 98 3 kg (216 lb 12 8 oz)   BMI 30 89 kg/m²    General : NAD    HEENT: dentition appropriate   Cardiac:  RRR, S1, S2 normal,  no murmur    Lung:  CTAB   Abdomen:  soft, nontender, no ascites    femoral pulses (2+ and soft, no bruit) and DP/PT pulses palpable bilateral   trace edema   Neuro:no focal neuro deficits   Skin: tattoo no  Carotid bruit: no  Lymph: no LN- cervical, axillary, inguinal  Poor balance on ambulation

## 2018-07-18 NOTE — PROGRESS NOTES
Vicky Guillen is a 27 y o  male  male referred by Dr Radha Rebloledo seen in the Nephrology Clinic for pre-transplant kidney evaluation  Patient has a PMHx of CKD, felt to be secondary to hypertension (diagnosed 3-4 years ago) and diabetes (diagnosed at 1years old, type I), CVA (first CVA 1, then again 2018), type 1 diabetes, HTN, smoker, hyperlipidemia, smoker  SANJEEV in 2018 - EF 60%, no thrombus noted, no foramen ovale    Now has loop recorder    MRI brain -new area of T2 hyperintensity in the right middle and right inferior cerebellar peduncle with resolution of a focus of hyperintensity in the right subinsular region  Suggesting dissemination in time and space  There is concern for demyelinating disease  Renal Doppler study with no evidence of occlusive disease bilaterally  There is possible intrinsic parenchymal disease on the left kidney  Left renal artery was not able to be evaluated due to bowel gas  Plan   1  Will present the patient's case the transplant committee meeting  Patient does have risk factors including poor balance due to prior stroke versus vision disturbances, unclear neurological issues at this juncture, heterozygote for prothrombin gene mutation, longstanding diabetes, uncontrolled hypertension  Given these factors, still would want to evaluate further given the patient's young age  2  The need to avoid blood transfusion to decrease allosensitization was explained to the patient  3  The advantages of a living donor over a  donor was explained to the patient and family  I strongly encouraged the patient and family to pursue a living donor  4   Patient has a history of lacunar infarct and recurrent stroke,  Also has 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 and underwent plasma exchange prior  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 heterozygote for prothrombin gene mutation    Patient follows with hematology and neurology  On plavix and aspirin  We will need to discuss further with both teams to determine the exact etiology  Patient is being referred to a stroke specialist by the primary neurologist and this will happen in   DM type I - uses 50 units insulin daily  6  Shingles episode 6-7 years ago  7  HTN - BP at home is 160-170s; in the AM is higher at times; is taking labetalol 150, 150, 300, hydralazine 100 TID; clonidine 0 1 mg HS, chlorthalidone 50 mg daily, torsemide  I will reach out to the primary nephrologist as the blood pressures are still elevated today  Consideration could be for holding chlorthalidone and increasing torsemide  I have discussed with Ann Interiano and family at length about the risk and benefits of kidney transplantation  I have strongly encouraged him to pursue living donation, and explained to him the benefits of living donation over  donor transplantation  We have briefly discussed the surgical procedure  We have discussed about the need for life long immunosuppression and the importance of compliance  We have discussed the side effects of immunosuppression including but not limited to infection, malignancies and developing/worsening diabetes control  Ann Interiano verbalized understanding and is interested in pursuing transplant  During this visit, I spent 60 minutes with the patient; more than 50% of the time I counseled the patient regarding the risks and benefits of kidney transplantation, the immediate and long-term complications of kidney transplantation, and the advantage of receiving a living donor kidney transplant  It was a pleasure evaluating your patient in the office today  Thank you for allowing our team to participate in the care of Mr Ann Interiano  Please do not hesitate to contact our team if further issues/questions shall arise in the interim         HISTORY OF PRESENT ILLNESS    Ann Interiano is a 27 y o  male seen in the Nephrology Clinic for evaluation for kidney transplant  CKD IV due to diabetes/HTN, not on dialysis, S Cr 6 4mg/dl, eGFR 10 ml/min  Renal disease is not biopsy proven  Primary Nephrologist is Dr Makenzie Hercules     Native Urine Output: yes  Hx of voiding difficulty: no  Hx of hematuria: no  Hx of proteinuria: yes  Hx of nephrolithiasis: no  Hx of recurrent urinary tract infection: no  HTN: onset 3-4 years ago,  hx of malignant HTN: yes, admission for HTN emergencies: yes  DM type 3 onset 1years old  Total insulin requirement/day 50 units  DM retinopathy: no, Laser Surgery: no  DM neuropathy: yes  DM gastroparesis: no  DKA: as a child  Hypoglycemic awareness: no  Hx of amputation: no   PAD/PVD: no, Claudication: no  Cardiac history - CAD: no, MI: no   Primary Cardiologist: Dr Won Pimentel  Exercise tolerance: does walking and PT when able to  Has balance issues with CVA prior  Hx of CVA: yes  Hx of DVT/PE: no  Viral Infection:  HIV: no  Hep B: no  Hep C: no  Cancer: History of cancer: no  Health maintenance and other pertinent history:    Colonoscopy: no  Prostate: no    Blood transfusion: yes    Last admission February - with high BP    Review Of Systems:     Constitutional: decreasing appetite  no fevers, chills, involuntary weight gain or weight loss  Eyes: vision disturbances  ENT: no ear disease, epistaxis or oral ulcers  Respiratory: no SOB  Cardiovascular: no CP, palpitations, claudication, edema  GI: no N/V/D/C, abdominal pain, melena, BRBPR  : see HPI  Endocrine: no thyroid disease  Heme: prior CVA X 2  MS: no joint effusions or deformities    Skin: no skin disease  Neuro: + prior CVA  Psych: no depression, anxiety    Past Medical History:   Diagnosis Date    [facility] [address] [facility]     Cerebellar stroke side undetermined 2015 2013    Diabetes type 1, controlled ([facility] [address] [facility]     Hypertension      Past Surgical History:   Procedure Laterality Date    TONSILLECTOMY        Awaiting PD catheter placement   Abdominal Surgeries: no    Family History   Problem Relation Age of Onset   [facility] Breast cancer Mother     Hypertension Mother     Hyperlipidemia Father     Hypertension Father     Leukemia Maternal Grandmother     Hyperlipidemia Maternal Grandfather     Hypertension Maternal Grandfather     Hyperlipidemia Paternal Grandmother     Hypertension Paternal Grandmother     Heart disease Paternal Grandfather         cardiac disorder    Diabetes Paternal Grandfather      Social History     Social History    Marital status: Single     Spouse name: N/A    Number of children: N/A    Years of education: N/A     Occupational History          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     Social History Main Topics    Smoking status: Former Smoker     Packs/day: 0 50     Years: 12 00     Types: Cigarettes    Smokeless tobacco: Never Used      Comment: quit 2/2018    Alcohol use Yes      Comment: socially    Drug use: Yes     Frequency: 3 0 times per week     Types: Marijuana      Comment: weekly    Sexual activity: Not Asked     Other Topics Concern    None     Social History Narrative    Caffeine use    single     Lives with mother and father  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  Living donors: has not discussed as of yet   Current Outpatient Prescriptions   Medication Sig Dispense Refill    aspirin (ECOTRIN LOW STRENGTH) 81 mg EC tablet Take 81 mg by mouth daily      atorvastatin (LIPITOR) 40 mg tablet Take 1 tablet (40 mg total) by mouth every evening 30 tablet 5    B-D ULTRAFINE III SHORT PEN 31G X 8 MM MISC Inject under the skin 4 (four) times a day 400 each 5    chlorthalidone (HYGROTEN) 50 MG tablet Take 1 tablet (50 mg total) by mouth daily 30 tablet 0    Cholecalciferol (VITAMIN D) 2000 units CAPS Take 1 capsule by mouth daily      cloNIDine (CATAPRES) 0 1 mg tablet Take 0 1 mg by mouth daily      clopidogrel (PLAVIX) 75 mg tablet Take 1 tablet (75 mg total) by mouth daily 30 tablet 5    escitalopram (LEXAPRO) 10 mg tablet Take 1 tablet (10 mg total) by mouth daily 30 tablet 5    folic acid (FOLVITE) 1 mg tablet Take 1 tablet (1 mg total) by mouth daily 30 tablet 5    hydrALAZINE (APRESOLINE) 100 MG tablet Take 1 tablet (100 mg total) by mouth 3 (three) times a day 270 tablet 3    insulin glargine (LANTUS) 100 units/mL subcutaneous injection Inject 8 Units under the skin daily with breakfast (Patient taking differently: Inject 10 Units under the skin 2 (two) times a day  ) 10 mL 0    insulin lispro (HumaLOG) 100 units/mL injection Inject 5 Units under the skin 3 (three) times a day with meals U-100 pen 10 mL 2    labetalol (NORMODYNE) 300 mg tablet Take 0 5 tablets (150 mg total) by mouth every 8 (eight) hours (Patient taking differently: Take 300 mg by mouth 2 (two) times a day Take 150 mg  In AM, 150 mg  At 2:00 PM and 300 mg  At 10:00 PM ) 60 tablet 3    NIFEdipine ER (ADALAT CC) 60 MG 24 hr tablet Take 1 tablet (60 mg total) by mouth every evening Take 30 mg  In AM, take 60 mg  @ 4PM 30 tablet 5    ondansetron (ZOFRAN) 4 mg tablet Take 1 tablet (4 mg total) by mouth every 8 (eight) hours as needed for nausea or vomiting 20 tablet 0    sodium bicarbonate 650 mg tablet Take 1 tablet (650 mg total) by mouth 4 (four) times a day 120 tablet 6    torsemide (DEMADEX) 20 mg tablet Take 1 tablet (20 mg total) by mouth daily Take an extra dose in the afternoon for three days then daily 90 tablet 3    ergocalciferol (ERGOCALCIFEROL) 02335 units capsule Take 1 capsule (50,000 Units total) by mouth every 30 (thirty) days for 12 days 12 capsule 0     No current facility-administered medications for this visit        Sulfa antibiotics  Physical Exam:  BP (!) 180/70   Pulse 76   Ht 5' 10 25" (1 784 m)   Wt 98 3 kg (216 lb 12 8 oz)   BMI 30 89 kg/m²   General : NAD    HEENT: dentition appropriate   Cardiac:  RRR, S1, S2 normal,  no murmur    Lung:  CTAB   Abdomen:  soft, nontender, no ascites femoral pulses (2+ and soft, no bruit) and DP/PT pulses palpable bilateral   trace edema   Neuro:no focal neuro deficits   Skin: tattoo no  Carotid bruit: no  Lymph: no LN- cervical, axillary, inguinal  Poor balance on ambulation

## 2018-07-19 DIAGNOSIS — N18.4 CHRONIC KIDNEY DISEASE (CKD) STAGE G4/A3, SEVERELY DECREASED GLOMERULAR FILTRATION RATE (GFR) BETWEEN 15-29 ML/MIN/1.73 SQUARE METER AND ALBUMINURIA CREATININE RATIO GREATER THAN 300 MG/G (HCC): Primary | ICD-10-CM

## 2018-07-20 ENCOUNTER — TELEPHONE (OUTPATIENT)
Dept: INTERNAL MEDICINE CLINIC | Facility: CLINIC | Age: 33
End: 2018-07-20

## 2018-07-20 NOTE — TELEPHONE ENCOUNTER
Patient is having surgery on 8/6/2018 and the surgeon reccommended that he stop plavix for 5 days prior  and resume immediately afterwards  They just wanted to check with you to make sure you were comfortable   with that  I told her you were out till Monday and she said that's fine, she will wait to hear from you

## 2018-07-23 ENCOUNTER — APPOINTMENT (OUTPATIENT)
Dept: LAB | Facility: CLINIC | Age: 33
End: 2018-07-23
Payer: COMMERCIAL

## 2018-07-23 DIAGNOSIS — N18.4 CHRONIC KIDNEY DISEASE (CKD) STAGE G4/A3, SEVERELY DECREASED GLOMERULAR FILTRATION RATE (GFR) BETWEEN 15-29 ML/MIN/1.73 SQUARE METER AND ALBUMINURIA CREATININE RATIO GREATER THAN 300 MG/G (HCC): ICD-10-CM

## 2018-07-23 LAB
ANION GAP SERPL CALCULATED.3IONS-SCNC: 7 MMOL/L (ref 4–13)
BUN SERPL-MCNC: 74 MG/DL (ref 5–25)
CALCIUM SERPL-MCNC: 8.9 MG/DL (ref 8.3–10.1)
CHLORIDE SERPL-SCNC: 106 MMOL/L (ref 100–108)
CO2 SERPL-SCNC: 24 MMOL/L (ref 21–32)
CREAT SERPL-MCNC: 6.96 MG/DL (ref 0.6–1.3)
GFR SERPL CREATININE-BSD FRML MDRD: 9 ML/MIN/1.73SQ M
GLUCOSE SERPL-MCNC: 199 MG/DL (ref 65–140)
POTASSIUM SERPL-SCNC: 5.3 MMOL/L (ref 3.5–5.3)
SODIUM SERPL-SCNC: 137 MMOL/L (ref 136–145)

## 2018-07-23 PROCEDURE — 36415 COLL VENOUS BLD VENIPUNCTURE: CPT

## 2018-07-23 PROCEDURE — 80048 BASIC METABOLIC PNL TOTAL CA: CPT

## 2018-07-24 ENCOUNTER — TELEPHONE (OUTPATIENT)
Dept: OTHER | Facility: HOSPITAL | Age: 33
End: 2018-07-24

## 2018-07-24 DIAGNOSIS — I10 HTN (HYPERTENSION): Primary | ICD-10-CM

## 2018-07-24 NOTE — TELEPHONE ENCOUNTER
Spoke with patient's mother, Ludwin Mendez  Patient scheduled for PD catheter insertion on 8/6/18 by Dr Diamond Otero  Advised may hold plavix 5 days prior to procedure and resume as soon as allowed by surgeon

## 2018-07-25 RX ORDER — CLONIDINE HYDROCHLORIDE 0.1 MG/1
0.2 TABLET ORAL
Qty: 60 TABLET | Refills: 5 | Status: SHIPPED | OUTPATIENT
Start: 2018-07-25 | End: 2018-09-16

## 2018-07-25 NOTE — TELEPHONE ENCOUNTER
I was able to contact Juve Millyfani mother Phuong Quevedo regarding recent blood work  Robbie Martinez is actually away on a little vacation right now  Overall he is doing quite well  His appetite remains good  His blood pressure is a little bit high at times in the morning and his mother relates that she is worried about the reading of 190 at times  I had felt that this was primarily volume related but now he is on an increased diuretic and blood pressure remains elevated therefore I instructed her to increase hs clonidine to 0 2 milligrams  Robbie Martinez is scheduled for peritoneal dialysis catheter placement in the beginning of August   Hopefully this will go well and he can begin PD training as soon as possible to begin renal replacement therapy  At this time acid-base balance and electrolytes are stable  No indication to start dialysis urgently

## 2018-07-30 ENCOUNTER — TELEPHONE (OUTPATIENT)
Dept: NEPHROLOGY | Facility: CLINIC | Age: 33
End: 2018-07-30

## 2018-07-30 NOTE — PRE-PROCEDURE INSTRUCTIONS
Pre-Surgery Instructions:   Medication Instructions    aspirin (ECOTRIN LOW STRENGTH) 81 mg EC tablet Patient was instructed by Physician and understands   atorvastatin (LIPITOR) 40 mg tablet Instructed patient per Anesthesia Guidelines   Cholecalciferol (VITAMIN D) 2000 units CAPS Patient was instructed by Physician and understands   cloNIDine (CATAPRES) 0 1 mg tablet Instructed patient per Anesthesia Guidelines   clopidogrel (PLAVIX) 75 mg tablet Patient was instructed by Physician and understands   ergocalciferol (ERGOCALCIFEROL) 45108 units capsule Patient was instructed by Physician and understands   escitalopram (LEXAPRO) 10 mg tablet Instructed patient per Anesthesia Guidelines   folic acid (FOLVITE) 1 mg tablet Patient was instructed by Physician and understands   hydrALAZINE (APRESOLINE) 100 MG tablet Instructed patient per Anesthesia Guidelines   insulin glargine (LANTUS) 100 units/mL subcutaneous injection Patient was instructed by Physician and understands   insulin lispro (HumaLOG) 100 units/mL injection Patient was instructed by Physician and understands   labetalol (NORMODYNE) 300 mg tablet Instructed patient per Anesthesia Guidelines   NIFEdipine ER (ADALAT CC) 60 MG 24 hr tablet Instructed patient per Anesthesia Guidelines   ondansetron (ZOFRAN) 4 mg tablet Instructed patient per Anesthesia Guidelines   sodium bicarbonate 650 mg tablet Instructed patient per Anesthesia Guidelines   torsemide (DEMADEX) 20 mg tablet Instructed patient per Anesthesia Guidelines  Pre op and bathing instructions reviewed   Pt has hibiclens

## 2018-07-30 NOTE — TELEPHONE ENCOUNTER
Hello    Hope you are well    Dr Mariza Mackenzie - thank you for message    Ms Wayne Cruz - I will send email to [facility] but if we can put in files that pt is starting dialysis soon    Thank you    vivien

## 2018-07-30 NOTE — TELEPHONE ENCOUNTER
Nephrology    Spoke with the patient over the phone  Patient is reporting some loss of appetite for the past few days  He reports no nausea no vomiting  He reports he might have a metallic taste that he noticed this morning  He reports no tremors  His last potassium was 5 3 his bicarbonate level was normal   His crit creatinine continues to trend up and is almost 7  I spoke to mother at different options he could do  The 1st option ease plan for PD catheter placement next Monday bite surgery and then we could just plan to start him on PD as soon as the catheter is functioning of ready to use  The other option is going to [facility] for urgent start PD  In the 3rd option is going to [facility] to start hemodialysis with a transition to peritoneal dialysis  Discussed risks and benefits with him  He says he does not feel that bad that he would like to wait till Monday get his PD catheter and start as soon as PD catheter is ready  I reached out to surgery and PD nurse

## 2018-07-31 DIAGNOSIS — N18.5 CKD (CHRONIC KIDNEY DISEASE) STAGE 5, GFR LESS THAN 15 ML/MIN (HCC): Primary | ICD-10-CM

## 2018-08-01 ENCOUNTER — TELEPHONE (OUTPATIENT)
Dept: OTHER | Facility: HOSPITAL | Age: 33
End: 2018-08-01

## 2018-08-01 ENCOUNTER — APPOINTMENT (OUTPATIENT)
Dept: RADIOLOGY | Facility: CLINIC | Age: 33
End: 2018-08-01
Payer: COMMERCIAL

## 2018-08-01 ENCOUNTER — APPOINTMENT (OUTPATIENT)
Dept: LAB | Facility: CLINIC | Age: 33
End: 2018-08-01
Payer: COMMERCIAL

## 2018-08-01 DIAGNOSIS — N18.5 CKD (CHRONIC KIDNEY DISEASE) STAGE 5, GFR LESS THAN 15 ML/MIN (HCC): ICD-10-CM

## 2018-08-01 DIAGNOSIS — N18.5 CKD (CHRONIC KIDNEY DISEASE) STAGE 5, GFR LESS THAN 15 ML/MIN (HCC): Primary | ICD-10-CM

## 2018-08-01 LAB
HAV IGM SER QL: NORMAL
HBV CORE IGM SER QL: NORMAL
HBV SURFACE AG SER QL: NORMAL
HCV AB SER QL: NORMAL

## 2018-08-01 PROCEDURE — 71046 X-RAY EXAM CHEST 2 VIEWS: CPT

## 2018-08-01 PROCEDURE — 80074 ACUTE HEPATITIS PANEL: CPT

## 2018-08-01 NOTE — TELEPHONE ENCOUNTER
Most recent labs show a slight improvement in creatinine and potassium  I spoke with Lorna Cardona regarding recent results  Davian Natarajan has been doing fairly well but is now noticing a decline in appetite  Will will continue with plan for PD catheter placement next Monday  [facility] is aware of plan  I spoke with Romi Cheema (PD RN) making sure that she is aware that mom will be coming to teaching  Weekly BMP's to closely monitor  Hopefully Davian Natarajan will remain stable until PD can be initiated                Ref Range & Units 8/1/18 1035 7/23/18 0636 7/16/18 0846    Sodium 136 - 145 mmol/L 141  137  143     Potassium 3 5 - 5 3 mmol/L 4 6  5 3  5 0     Chloride 100 - 108 mmol/L 107  106  108     CO2 21 - 32 mmol/L 25  24  25     Anion Gap 4 - 13 mmol/L 9  7  10     BUN 5 - 25 mg/dL 54   74   63      Creatinine 0 60 - 1 30 mg/dL 6 66   6 96CM   6 41CM     Comment: Standardized to IDMS reference method

## 2018-08-06 ENCOUNTER — HOSPITAL ENCOUNTER (OUTPATIENT)
Facility: HOSPITAL | Age: 33
Setting detail: OUTPATIENT SURGERY
Discharge: HOME | End: 2018-08-06
Attending: SURGERY | Admitting: SURGERY
Payer: COMMERCIAL

## 2018-08-06 ENCOUNTER — ANESTHESIA (OUTPATIENT)
Dept: PERIOP | Facility: HOSPITAL | Age: 33
End: 2018-08-06
Payer: COMMERCIAL

## 2018-08-06 ENCOUNTER — ANESTHESIA EVENT (OUTPATIENT)
Dept: PERIOP | Facility: HOSPITAL | Age: 33
End: 2018-08-06
Payer: COMMERCIAL

## 2018-08-06 VITALS
SYSTOLIC BLOOD PRESSURE: 175 MMHG | DIASTOLIC BLOOD PRESSURE: 92 MMHG | HEIGHT: 71 IN | HEART RATE: 80 BPM | BODY MASS INDEX: 30.24 KG/M2 | OXYGEN SATURATION: 97 % | TEMPERATURE: 97.6 F | RESPIRATION RATE: 17 BRPM | WEIGHT: 216 LBS

## 2018-08-06 DIAGNOSIS — N18.4 CHRONIC KIDNEY DISEASE, STAGE 4 (SEVERE) (HCC): Primary | Chronic | ICD-10-CM

## 2018-08-06 LAB
GLUCOSE SERPL-MCNC: 218 MG/DL (ref 65–140)
GLUCOSE SERPL-MCNC: 258 MG/DL (ref 65–140)

## 2018-08-06 PROCEDURE — C1750 CATH, HEMODIALYSIS,LONG-TERM: HCPCS | Performed by: SURGERY

## 2018-08-06 PROCEDURE — 82948 REAGENT STRIP/BLOOD GLUCOSE: CPT

## 2018-08-06 DEVICE — PERITONEAL DIALYSIS CATHETER,MONCRIEF-POPOVICH SWAN NECK CURL CATH, 2 CUFF,62.5 CM
Type: IMPLANTABLE DEVICE | Site: ABDOMEN | Status: NON-FUNCTIONAL
Brand: ARGYLE
Removed: 2020-11-18

## 2018-08-06 RX ORDER — FENTANYL CITRATE 50 UG/ML
INJECTION, SOLUTION INTRAMUSCULAR; INTRAVENOUS AS NEEDED
Status: DISCONTINUED | OUTPATIENT
Start: 2018-08-06 | End: 2018-08-06 | Stop reason: SURG

## 2018-08-06 RX ORDER — PROMETHAZINE HYDROCHLORIDE 25 MG/ML
12.5 INJECTION, SOLUTION INTRAMUSCULAR; INTRAVENOUS ONCE AS NEEDED
Status: DISCONTINUED | OUTPATIENT
Start: 2018-08-06 | End: 2018-08-06 | Stop reason: HOSPADM

## 2018-08-06 RX ORDER — METOCLOPRAMIDE HYDROCHLORIDE 5 MG/ML
INJECTION INTRAMUSCULAR; INTRAVENOUS AS NEEDED
Status: DISCONTINUED | OUTPATIENT
Start: 2018-08-06 | End: 2018-08-06 | Stop reason: SURG

## 2018-08-06 RX ORDER — METOCLOPRAMIDE HYDROCHLORIDE 5 MG/ML
10 INJECTION INTRAMUSCULAR; INTRAVENOUS ONCE AS NEEDED
Status: DISCONTINUED | OUTPATIENT
Start: 2018-08-06 | End: 2018-08-06 | Stop reason: HOSPADM

## 2018-08-06 RX ORDER — LIDOCAINE HYDROCHLORIDE 10 MG/ML
INJECTION, SOLUTION INFILTRATION; PERINEURAL AS NEEDED
Status: DISCONTINUED | OUTPATIENT
Start: 2018-08-06 | End: 2018-08-06 | Stop reason: SURG

## 2018-08-06 RX ORDER — MORPHINE SULFATE 10 MG/ML
4 INJECTION, SOLUTION INTRAMUSCULAR; INTRAVENOUS
Status: DISCONTINUED | OUTPATIENT
Start: 2018-08-06 | End: 2018-08-06 | Stop reason: HOSPADM

## 2018-08-06 RX ORDER — ONDANSETRON 2 MG/ML
4 INJECTION INTRAMUSCULAR; INTRAVENOUS EVERY 4 HOURS PRN
Status: DISCONTINUED | OUTPATIENT
Start: 2018-08-06 | End: 2018-08-06 | Stop reason: HOSPADM

## 2018-08-06 RX ORDER — ONDANSETRON 2 MG/ML
INJECTION INTRAMUSCULAR; INTRAVENOUS AS NEEDED
Status: DISCONTINUED | OUTPATIENT
Start: 2018-08-06 | End: 2018-08-06 | Stop reason: SURG

## 2018-08-06 RX ORDER — OXYCODONE HYDROCHLORIDE AND ACETAMINOPHEN 5; 325 MG/1; MG/1
2 TABLET ORAL EVERY 4 HOURS PRN
Qty: 28 TABLET | Refills: 0 | Status: SHIPPED | OUTPATIENT
Start: 2018-08-06 | End: 2018-11-09 | Stop reason: ALTCHOICE

## 2018-08-06 RX ORDER — OXYCODONE HYDROCHLORIDE AND ACETAMINOPHEN 5; 325 MG/1; MG/1
2 TABLET ORAL EVERY 4 HOURS PRN
Status: DISCONTINUED | OUTPATIENT
Start: 2018-08-06 | End: 2018-08-06 | Stop reason: HOSPADM

## 2018-08-06 RX ORDER — HYDRALAZINE HYDROCHLORIDE 20 MG/ML
10 INJECTION INTRAMUSCULAR; INTRAVENOUS
Status: DISCONTINUED | OUTPATIENT
Start: 2018-08-06 | End: 2018-08-06 | Stop reason: HOSPADM

## 2018-08-06 RX ORDER — ROCURONIUM BROMIDE 10 MG/ML
INJECTION, SOLUTION INTRAVENOUS AS NEEDED
Status: DISCONTINUED | OUTPATIENT
Start: 2018-08-06 | End: 2018-08-06 | Stop reason: SURG

## 2018-08-06 RX ORDER — SODIUM CHLORIDE 9 MG/ML
INJECTION, SOLUTION INTRAVENOUS CONTINUOUS PRN
Status: DISCONTINUED | OUTPATIENT
Start: 2018-08-06 | End: 2018-08-06 | Stop reason: SURG

## 2018-08-06 RX ORDER — GLYCOPYRROLATE 0.2 MG/ML
INJECTION INTRAMUSCULAR; INTRAVENOUS AS NEEDED
Status: DISCONTINUED | OUTPATIENT
Start: 2018-08-06 | End: 2018-08-06 | Stop reason: SURG

## 2018-08-06 RX ORDER — PROPOFOL 10 MG/ML
INJECTION, EMULSION INTRAVENOUS AS NEEDED
Status: DISCONTINUED | OUTPATIENT
Start: 2018-08-06 | End: 2018-08-06 | Stop reason: SURG

## 2018-08-06 RX ORDER — BUPIVACAINE HYDROCHLORIDE AND EPINEPHRINE 2.5; 5 MG/ML; UG/ML
INJECTION, SOLUTION EPIDURAL; INFILTRATION; INTRACAUDAL; PERINEURAL AS NEEDED
Status: DISCONTINUED | OUTPATIENT
Start: 2018-08-06 | End: 2018-08-06 | Stop reason: HOSPADM

## 2018-08-06 RX ORDER — ONDANSETRON 2 MG/ML
4 INJECTION INTRAMUSCULAR; INTRAVENOUS ONCE AS NEEDED
Status: DISCONTINUED | OUTPATIENT
Start: 2018-08-06 | End: 2018-08-06 | Stop reason: HOSPADM

## 2018-08-06 RX ORDER — SODIUM CHLORIDE, SODIUM LACTATE, POTASSIUM CHLORIDE, CALCIUM CHLORIDE 600; 310; 30; 20 MG/100ML; MG/100ML; MG/100ML; MG/100ML
100 INJECTION, SOLUTION INTRAVENOUS CONTINUOUS
Status: DISCONTINUED | OUTPATIENT
Start: 2018-08-06 | End: 2018-08-06 | Stop reason: HOSPADM

## 2018-08-06 RX ORDER — MIDAZOLAM HYDROCHLORIDE 1 MG/ML
INJECTION INTRAMUSCULAR; INTRAVENOUS AS NEEDED
Status: DISCONTINUED | OUTPATIENT
Start: 2018-08-06 | End: 2018-08-06 | Stop reason: SURG

## 2018-08-06 RX ORDER — LABETALOL HYDROCHLORIDE 5 MG/ML
10 INJECTION, SOLUTION INTRAVENOUS
Status: DISCONTINUED | OUTPATIENT
Start: 2018-08-06 | End: 2018-08-06 | Stop reason: HOSPADM

## 2018-08-06 RX ORDER — SODIUM CHLORIDE, SODIUM LACTATE, POTASSIUM CHLORIDE, CALCIUM CHLORIDE 600; 310; 30; 20 MG/100ML; MG/100ML; MG/100ML; MG/100ML
125 INJECTION, SOLUTION INTRAVENOUS CONTINUOUS
Status: DISCONTINUED | OUTPATIENT
Start: 2018-08-06 | End: 2018-08-06 | Stop reason: HOSPADM

## 2018-08-06 RX ORDER — FENTANYL CITRATE/PF 50 MCG/ML
25 SYRINGE (ML) INJECTION
Status: DISCONTINUED | OUTPATIENT
Start: 2018-08-06 | End: 2018-08-06 | Stop reason: HOSPADM

## 2018-08-06 RX ADMIN — NEOSTIGMINE METHYLSULFATE 5 MG: 1 INJECTION, SOLUTION INTRAMUSCULAR; INTRAVENOUS; SUBCUTANEOUS at 13:07

## 2018-08-06 RX ADMIN — FENTANYL CITRATE 50 MCG: 50 INJECTION INTRAMUSCULAR; INTRAVENOUS at 12:35

## 2018-08-06 RX ADMIN — ROCURONIUM BROMIDE 40 MG: 10 INJECTION INTRAVENOUS at 12:36

## 2018-08-06 RX ADMIN — OXYCODONE HYDROCHLORIDE AND ACETAMINOPHEN 2 TABLET: 5; 325 TABLET ORAL at 14:36

## 2018-08-06 RX ADMIN — METOCLOPRAMIDE HYDROCHLORIDE 10 MG: 5 INJECTION INTRAMUSCULAR; INTRAVENOUS at 12:29

## 2018-08-06 RX ADMIN — ROCURONIUM BROMIDE 10 MG: 10 INJECTION INTRAVENOUS at 12:50

## 2018-08-06 RX ADMIN — FENTANYL CITRATE 50 MCG: 50 INJECTION INTRAMUSCULAR; INTRAVENOUS at 12:43

## 2018-08-06 RX ADMIN — ONDANSETRON 4 MG: 2 INJECTION INTRAMUSCULAR; INTRAVENOUS at 12:29

## 2018-08-06 RX ADMIN — LIDOCAINE HYDROCHLORIDE 50 MG: 10 INJECTION, SOLUTION INFILTRATION; PERINEURAL at 12:36

## 2018-08-06 RX ADMIN — MIDAZOLAM HYDROCHLORIDE 2 MG: 1 INJECTION, SOLUTION INTRAMUSCULAR; INTRAVENOUS at 12:29

## 2018-08-06 RX ADMIN — SODIUM CHLORIDE: 0.9 INJECTION, SOLUTION INTRAVENOUS at 12:31

## 2018-08-06 RX ADMIN — CEFAZOLIN SODIUM 2000 MG: 2 SOLUTION INTRAVENOUS at 12:41

## 2018-08-06 RX ADMIN — PROPOFOL 200 MG: 10 INJECTION, EMULSION INTRAVENOUS at 12:36

## 2018-08-06 RX ADMIN — HYDRALAZINE HYDROCHLORIDE 10 MG: 20 INJECTION INTRAMUSCULAR; INTRAVENOUS at 13:39

## 2018-08-06 RX ADMIN — GLYCOPYRROLATE 0.8 MG: 0.2 INJECTION, SOLUTION INTRAMUSCULAR; INTRAVENOUS at 13:07

## 2018-08-06 NOTE — ANESTHESIA PREPROCEDURE EVALUATION
Review of Systems/Medical History  Patient summary reviewed  Chart reviewed  No history of anesthetic complications     Cardiovascular  EKG reviewed, Exercise tolerance (METS): >4,  Hypertension ,    Pulmonary  Smoker ex-smoker  ,        GI/Hepatic  Negative GI/hepatic ROS          Chronic kidney disease stage 4,        Endo/Other  Diabetes poorly controlled type 1 Insulin,   Obesity    GYN  Negative gynecology ROS          Hematology  Anemia ,     Musculoskeletal  Negative musculoskeletal ROS        Neurology    CVA , residual symptoms,    Psychology   Negative psychology ROS              Physical Exam    Airway    Mallampati score: III  TM Distance: >3 FB  Neck ROM: full     Dental   No notable dental hx     Cardiovascular      Pulmonary      Other Findings        Anesthesia Plan  ASA Score- 3     Anesthesia Type- general with ASA Monitors  Additional Monitors:   Airway Plan: ETT  Plan Factors- Patient instructed to abstain from smoking on day of procedure  Patient did not smoke on day of surgery  Induction- intravenous  Postoperative Plan- Plan for postoperative opioid use  Planned trial extubation    Informed Consent- Anesthetic plan and risks discussed with patient, mother and father

## 2018-08-06 NOTE — OP NOTE
OPERATIVE REPORT  PATIENT NAME: Franky Mathews    :  1985  MRN: [id number]  Pt Location: AN OR ROOM 02    SURGERY DATE: 2018    Surgeon(s) and Role:     * Khanh Ma,  - Primary     * Luciana Melissa MD - Assisting    Preop Diagnosis:  Renal failure [N19]    Post-Op Diagnosis Codes:     * Renal failure [N19]    Procedure(s) (LRB):  LAPAROSCOPIC PD CATHETER PLACEMENT (N/A)    Specimen(s):  * No specimens in log *    Estimated Blood Loss:   Minimal    Drains:  NG/OG/Enteral Tube Orogastric 18 Fr (Active)   Number of days: 0       Anesthesia Type:   General/local    Operative Indications:  Renal failure [N19]      Operative Findings:  Review of Systems/Medical History  Patient summary reviewed  Chart reviewed  No history of anesthetic complications     Cardiovascular  EKG reviewed, Exercise tolerance (METS): >4,  Hypertension ,  Pulmonary  Smoker ex-smoker  ,       GI/Hepatic  Negative GI/hepatic ROS          Chronic kidney disease stage 4,       Endo/Other  Diabetes poorly controlled type 1 Insulin,   Obesity     GYN  Negative gynecology ROS          Hematology  Anemia ,    Musculoskeletal  Negative musculoskeletal ROS       Neurology  CVA , residual symptoms,  Psychology   Negative psychology ROS          Height 71 inches weight 98 kg/216 lb BMI 30  ASA 3, wound class 1    Complications:   None    Procedure and Technique:  Patient was brought into the OR and  identified by visualization, conversation, and by armband  Sequential compression pumps were placed  Was given perioperative antibiotics  Abdomen is then prepped and draped in a sterile fashion  Time-out was performed and was assured that the prep was dry  Local was instilled at the supraumbilical fold  Small vertical skin incision was made subcutaneous tissues were bluntly dissected with Kocher clamps to the fascia  Fascia was lifted up and divided the midline   I poked through the underlying peritoneum with a hemostat gaining access into the abdominal cavity  A 5 mm trocar was placed under direct visualization  Was placed in Trendelenburg position to look in the pelvis  A 5 mm trocar was placed under direct visualization in the right mid abdomen  After choosing appropriate spot on the left rectus muscle local was instilled  11 mm trocar was placed in a Z-type fashion through the fascia and then peritoneum into the abdomen  A Kayla Popowich  PD catheter was then placed through the 11 mm trocar assuring that the coiled aspect was down low in the pelvis  It was also assured that the deeper cut cuff was above the peritoneal reflection  Remaining aspects of the catheter then tunneled in the left subcutaneous space and exited through a separate left lower quadrant stab incision  Titanium lower lock adaptor was applied  Saline bag was hooked up and it was excellent flow saline into the pelvis  Bag was placed on the floor and the fluid came out with ease  Catheter was then capped with 500 units of heparin  White clipped and lower lock caps were placed  Exterior eyes portion the catheter was then coiled up and placed in the subcutaneous pocket left lower quadrant  Pneumoperitoneum was released  Fascia at the umbilical site was closing 0 Vicryl in a figure-of-eight fashion  Local was instilled  Irrigation is carried out  Three 0 Vicryl was used to close subcutaneous tissues  Four Monocryl was used to close skin of all wounds in a subcuticular fashion  Wounds washed and dried  Sterile histoacryl was applied  He was awakened in the operating returned to the recovery area in stable condition having tolerated the procedure well     I was present for the entire procedure    Patient Disposition:  PACU     SIGNATURE: Fay Toledo DO  DATE: August 6, 2018  TIME: 1:10 PM

## 2018-08-06 NOTE — DISCHARGE INSTRUCTIONS
May shower or bathe issue normally do  Ice to incisions as needed  Wounds are covered with surgical glue    May apply bandage if you notice slight drainage    May use Tylenol along the with ice for pain  Prescription for Percocet provided      Call Dr Valentino Pina with questions or concerns, [phone]

## 2018-08-06 NOTE — ANESTHESIA POSTPROCEDURE EVALUATION
Post-Op Assessment Note      CV Status:  Stable    Mental Status:  Awake    Hydration Status:  Stable    PONV Controlled:  None    Airway Patency:  Patent    Post Op Vitals Reviewed: Yes          Staff: Anesthesiologist           BP (!) 183/94 (Simultaneous filing  User may not have seen previous data ) (08/06/18 1330)    Temp 99 1 °F (37 3 °C) (Simultaneous filing  User may not have seen previous data ) (08/06/18 1330)    Pulse 83 (Simultaneous filing  User may not have seen previous data ) (08/06/18 1330)   Resp 18 (Simultaneous filing   User may not have seen previous data ) (08/06/18 1330)    SpO2 94 % (08/06/18 1330)

## 2018-08-08 ENCOUNTER — APPOINTMENT (OUTPATIENT)
Dept: LAB | Facility: CLINIC | Age: 33
End: 2018-08-08
Payer: COMMERCIAL

## 2018-08-08 DIAGNOSIS — N18.4 CKD (CHRONIC KIDNEY DISEASE) STAGE 4, GFR 15-29 ML/MIN (HCC): ICD-10-CM

## 2018-08-08 LAB
ALBUMIN SERPL BCP-MCNC: 3.5 G/DL (ref 3.5–5)
ANION GAP SERPL CALCULATED.3IONS-SCNC: 10 MMOL/L (ref 4–13)
BUN SERPL-MCNC: 56 MG/DL (ref 5–25)
CALCIUM SERPL-MCNC: 8.8 MG/DL (ref 8.3–10.1)
CHLORIDE SERPL-SCNC: 105 MMOL/L (ref 100–108)
CO2 SERPL-SCNC: 25 MMOL/L (ref 21–32)
CREAT SERPL-MCNC: 6.85 MG/DL (ref 0.6–1.3)
GFR SERPL CREATININE-BSD FRML MDRD: 10 ML/MIN/1.73SQ M
GLUCOSE P FAST SERPL-MCNC: 115 MG/DL (ref 65–99)
PHOSPHATE SERPL-MCNC: 5.4 MG/DL (ref 2.7–4.5)
POTASSIUM SERPL-SCNC: 4.8 MMOL/L (ref 3.5–5.3)
PTH-INTACT SERPL-MCNC: 283.8 PG/ML (ref 18.4–80.1)
SODIUM SERPL-SCNC: 140 MMOL/L (ref 136–145)

## 2018-08-08 PROCEDURE — 36415 COLL VENOUS BLD VENIPUNCTURE: CPT

## 2018-08-08 PROCEDURE — 83970 ASSAY OF PARATHORMONE: CPT

## 2018-08-08 PROCEDURE — 80069 RENAL FUNCTION PANEL: CPT

## 2018-08-09 ENCOUNTER — OFFICE VISIT (OUTPATIENT)
Dept: INTERNAL MEDICINE CLINIC | Facility: CLINIC | Age: 33
End: 2018-08-09
Payer: COMMERCIAL

## 2018-08-09 VITALS
SYSTOLIC BLOOD PRESSURE: 132 MMHG | RESPIRATION RATE: 16 BRPM | BODY MASS INDEX: 30.66 KG/M2 | OXYGEN SATURATION: 93 % | HEIGHT: 71 IN | WEIGHT: 219 LBS | DIASTOLIC BLOOD PRESSURE: 74 MMHG | HEART RATE: 83 BPM | TEMPERATURE: 98 F

## 2018-08-09 DIAGNOSIS — Z99.2 PERITONEAL DIALYSIS CATHETER IN PLACE (HCC): ICD-10-CM

## 2018-08-09 DIAGNOSIS — H51.0 BINOCULAR VISION DISORDER WITH CONJUGATE GAZE PALSY: ICD-10-CM

## 2018-08-09 DIAGNOSIS — N17.0 ACUTE RENAL FAILURE WITH ACUTE TUBULAR NECROSIS SUPERIMPOSED ON STAGE 2 CHRONIC KIDNEY DISEASE (HCC): ICD-10-CM

## 2018-08-09 DIAGNOSIS — E10.21 TYPE 1 DIABETES MELLITUS WITH DIABETIC NEPHROPATHY, WITH LONG-TERM CURRENT USE OF INSULIN (HCC): Chronic | ICD-10-CM

## 2018-08-09 DIAGNOSIS — E72.11 HYPERHOMOCYSTEINEMIA (HCC): ICD-10-CM

## 2018-08-09 DIAGNOSIS — I16.0 HYPERTENSIVE URGENCY: ICD-10-CM

## 2018-08-09 DIAGNOSIS — H46.9 OPTIC NEURITIS DUE TO MULTIPLE SCLEROSIS (HCC): ICD-10-CM

## 2018-08-09 DIAGNOSIS — N17.0 ACUTE RENAL FAILURE WITH ACUTE TUBULAR NECROSIS SUPERIMPOSED ON STAGE 3 CHRONIC KIDNEY DISEASE (HCC): ICD-10-CM

## 2018-08-09 DIAGNOSIS — E55.9 VITAMIN D DEFICIENCY: ICD-10-CM

## 2018-08-09 DIAGNOSIS — I63.9 CEREBROVASCULAR ACCIDENT (CVA), UNSPECIFIED MECHANISM (HCC): ICD-10-CM

## 2018-08-09 DIAGNOSIS — H53.8 BLURRED VISION: ICD-10-CM

## 2018-08-09 DIAGNOSIS — K59.09 OTHER CONSTIPATION: ICD-10-CM

## 2018-08-09 DIAGNOSIS — G35 OPTIC NEURITIS DUE TO MULTIPLE SCLEROSIS (HCC): ICD-10-CM

## 2018-08-09 DIAGNOSIS — H53.30 BINOCULAR VISUAL DISTURBANCE: ICD-10-CM

## 2018-08-09 DIAGNOSIS — E83.39 HYPERPHOSPHATEMIA: ICD-10-CM

## 2018-08-09 DIAGNOSIS — I63.9 CEREBROVASCULAR ACCIDENT (CVA) OF LEFT PONTINE STRUCTURE (HCC): ICD-10-CM

## 2018-08-09 DIAGNOSIS — R80.1 PERSISTENT PROTEINURIA: ICD-10-CM

## 2018-08-09 DIAGNOSIS — F33.9 EPISODE OF RECURRENT MAJOR DEPRESSIVE DISORDER, UNSPECIFIED DEPRESSION EPISODE SEVERITY (HCC): ICD-10-CM

## 2018-08-09 DIAGNOSIS — R11.0 NAUSEA: Primary | ICD-10-CM

## 2018-08-09 DIAGNOSIS — R79.89 AZOTEMIA: ICD-10-CM

## 2018-08-09 DIAGNOSIS — N18.2 ACUTE RENAL FAILURE WITH ACUTE TUBULAR NECROSIS SUPERIMPOSED ON STAGE 2 CHRONIC KIDNEY DISEASE (HCC): ICD-10-CM

## 2018-08-09 DIAGNOSIS — Z86.73 HISTORY OF LACUNAR CEREBROVASCULAR ACCIDENT (CVA): ICD-10-CM

## 2018-08-09 DIAGNOSIS — N18.30 ACUTE RENAL FAILURE WITH ACUTE TUBULAR NECROSIS SUPERIMPOSED ON STAGE 3 CHRONIC KIDNEY DISEASE (HCC): ICD-10-CM

## 2018-08-09 PROCEDURE — 99214 OFFICE O/P EST MOD 30 MIN: CPT | Performed by: INTERNAL MEDICINE

## 2018-08-09 RX ORDER — ATORVASTATIN CALCIUM 40 MG/1
40 TABLET, FILM COATED ORAL EVERY EVENING
Qty: 90 TABLET | Refills: 1 | Status: SHIPPED | OUTPATIENT
Start: 2018-08-09 | End: 2019-01-04 | Stop reason: SDUPTHER

## 2018-08-09 RX ORDER — FOLIC ACID 1 MG/1
1 TABLET ORAL DAILY
Qty: 90 TABLET | Refills: 1 | Status: SHIPPED | OUTPATIENT
Start: 2018-08-09 | End: 2019-03-05

## 2018-08-09 RX ORDER — ESCITALOPRAM OXALATE 10 MG/1
10 TABLET ORAL DAILY
Qty: 90 TABLET | Refills: 1 | Status: SHIPPED | OUTPATIENT
Start: 2018-08-09 | End: 2018-11-05 | Stop reason: SDUPTHER

## 2018-08-09 RX ORDER — INSULIN GLARGINE 100 [IU]/ML
INJECTION, SOLUTION SUBCUTANEOUS
Qty: 10 ML | Refills: 3 | Status: SHIPPED | OUTPATIENT
Start: 2018-08-09 | End: 2018-08-16

## 2018-08-09 RX ORDER — CLOPIDOGREL BISULFATE 75 MG/1
75 TABLET ORAL DAILY
Qty: 90 TABLET | Refills: 1 | Status: SHIPPED | OUTPATIENT
Start: 2018-08-09 | End: 2019-02-19 | Stop reason: SDUPTHER

## 2018-08-09 RX ORDER — PEN NEEDLE, DIABETIC 31 GX5/16"
NEEDLE, DISPOSABLE MISCELLANEOUS 4 TIMES DAILY
Qty: 400 EACH | Refills: 3 | Status: ON HOLD | OUTPATIENT
Start: 2018-08-09 | End: 2019-04-18

## 2018-08-09 NOTE — PROGRESS NOTES
Assessment/Plan:    Other constipation  Secondary to decreased appetite and percocet  Advised starting colace 100mg bid and senna daily  Patient also encouraged to increase his ambulation    Type 1 diabetes mellitus with diabetic nephropathy, with long-term current use of insulin ([facility])  DM1 with nephropathy - A1c is well controlled at 5 3 on self adjusted dose of Lantus 10u AM and 12u PM and Humalog 5U TID  Patient aware to decrease dose when his appetite is suppressed  He has upcoming appt to establish care with Endo 9/2018  Nausea  Possibly secondary to percocet vs constipation  Advised to use zofran which he has a prescription of and wean off percocet  Peritoneal dialysis catheter in place [facility])  Tender lower abd without sign of infection  Advised weaning off percocet, switch to tylenol  Follow up with Surgery  1  Nausea  atorvastatin (LIPITOR) 40 mg tablet    clopidogrel (PLAVIX) 75 mg tablet    folic acid (FOLVITE) 1 mg tablet   2  Type 1 diabetes mellitus with diabetic nephropathy, with long-term current use of insulin ([facility])  Hemoglobin A1C    atorvastatin (LIPITOR) 40 mg tablet    clopidogrel (PLAVIX) 75 mg tablet    folic acid (FOLVITE) 1 mg tablet    insulin glargine (LANTUS) 100 units/mL subcutaneous injection    B-D ULTRAFINE III SHORT PEN 31G X 8 MM MISC   3  Other constipation     4  Peritoneal dialysis catheter in place [facility])     5  Blurred vision  atorvastatin (LIPITOR) 40 mg tablet    clopidogrel (PLAVIX) 75 mg tablet    folic acid (FOLVITE) 1 mg tablet   6  Cerebrovascular accident (CVA), unspecified mechanism ([facility] [address] )  atorvastatin (LIPITOR) 40 mg tablet    clopidogrel (PLAVIX) 75 mg tablet    folic acid (FOLVITE) 1 mg tablet   7  Optic neuritis due to multiple sclerosis (HCC)  atorvastatin (LIPITOR) 40 mg tablet    clopidogrel (PLAVIX) 75 mg tablet    folic acid (FOLVITE) 1 mg tablet   8   Hypertensive urgency  atorvastatin (LIPITOR) 40 mg tablet    clopidogrel (PLAVIX) 75 mg tablet    folic acid (FOLVITE) 1 mg tablet   9  Acute renal failure with acute tubular necrosis superimposed on stage 2 chronic kidney disease (HCC)  atorvastatin (LIPITOR) 40 mg tablet    clopidogrel (PLAVIX) 75 mg tablet    folic acid (FOLVITE) 1 mg tablet   10  Acute renal failure with acute tubular necrosis superimposed on stage 3 chronic kidney disease (HCC)  atorvastatin (LIPITOR) 40 mg tablet    clopidogrel (PLAVIX) 75 mg tablet    folic acid (FOLVITE) 1 mg tablet   11  Azotemia  atorvastatin (LIPITOR) 40 mg tablet    clopidogrel (PLAVIX) 75 mg tablet    folic acid (FOLVITE) 1 mg tablet   12  Hyperphosphatemia  atorvastatin (LIPITOR) 40 mg tablet    clopidogrel (PLAVIX) 75 mg tablet    folic acid (FOLVITE) 1 mg tablet   13  Persistent proteinuria  atorvastatin (LIPITOR) 40 mg tablet    clopidogrel (PLAVIX) 75 mg tablet    folic acid (FOLVITE) 1 mg tablet   14  Vitamin D deficiency  atorvastatin (LIPITOR) 40 mg tablet    clopidogrel (PLAVIX) 75 mg tablet    folic acid (FOLVITE) 1 mg tablet   15  Cerebrovascular accident (CVA) of left pontine structure ([address] )  atorvastatin (LIPITOR) 40 mg tablet    clopidogrel (PLAVIX) 75 mg tablet    folic acid (FOLVITE) 1 mg tablet   16  History of lacunar cerebrovascular accident (CVA)  atorvastatin (LIPITOR) 40 mg tablet    clopidogrel (PLAVIX) 75 mg tablet    folic acid (FOLVITE) 1 mg tablet   17  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,    atorvastatin (LIPITOR) 40 mg tablet    clopidogrel (PLAVIX) 75 mg tablet    folic acid (FOLVITE) 1 mg tablet   18  Hyperhomocysteinemia (HCC)  atorvastatin (LIPITOR) 40 mg tablet    clopidogrel (PLAVIX) 75 mg tablet    folic acid (FOLVITE) 1 mg tablet   19   Binocular visual disturbance  atorvastatin (LIPITOR) 40 mg tablet    clopidogrel (PLAVIX) 75 mg tablet    folic acid (FOLVITE) 1 mg tablet   20  Episode of recurrent major depressive disorder, unspecified depression episode severity ([facility])  escitalopram (LEXAPRO) 10 mg tablet       Subjective:      Patient ID: Ann Interiano is a 27 y o  male  HPI  28yo male with DM1 with nephropathy, HTN, HLD, CKD IV with h/o cerebellar stroke and L pontine CVA here for follow up care  He is accompanied by his mom  Had PD catheter placed 3 days ago  Abd pain is improving, but feels nausea this morning and fatigue  He has been taking percocet q8h for the past 3 days  Denies fever, urinary changes, vomiting, diarrhea or SOB  Last BM 3-4 days ago  BP usually high in AM but settles down in the afternoon  Has had minimal hypoglycemic symptoms, though has been self adjusting his Lantus and Humalog while nauseated      The following portions of the patient's history were reviewed and updated as appropriate: allergies, current medications, past family history, past medical history, past social history, past surgical history and problem list     Current Outpatient Prescriptions:     aspirin (ECOTRIN LOW STRENGTH) 81 mg EC tablet, Take 81 mg by mouth daily, Disp: , Rfl:     atorvastatin (LIPITOR) 40 mg tablet, Take 1 tablet (40 mg total) by mouth every evening, Disp: 90 tablet, Rfl: 1    B-D ULTRAFINE III SHORT PEN 31G X 8 MM MISC, Inject under the skin 4 (four) times a day, Disp: 400 each, Rfl: 3    Cholecalciferol (VITAMIN D) 2000 units CAPS, Take 1 capsule by mouth daily, Disp: , Rfl:     cloNIDine (CATAPRES) 0 1 mg tablet, Take 2 tablets (0 2 mg total) by mouth daily at bedtime, Disp: 60 tablet, Rfl: 5    clopidogrel (PLAVIX) 75 mg tablet, Take 1 tablet (75 mg total) by mouth daily, Disp: 90 tablet, Rfl: 1    ergocalciferol (ERGOCALCIFEROL) 82773 units capsule, Take 1 capsule (50,000 Units total) by mouth every 30 (thirty) days for 12 days, Disp: 12 capsule, Rfl: 0    escitalopram (LEXAPRO) 10 mg tablet, Take 1 tablet (10 mg total) by mouth daily, Disp: 90 tablet, Rfl: 1    folic acid (FOLVITE) 1 mg tablet, Take 1 tablet (1 mg total) by mouth daily, Disp: 90 tablet, Rfl: 1    hydrALAZINE (APRESOLINE) 100 MG tablet, Take 1 tablet (100 mg total) by mouth 3 (three) times a day, Disp: 270 tablet, Rfl: 3    insulin glargine (LANTUS) 100 units/mL subcutaneous injection, Inject 10units in AM and 12units in PM, Disp: 10 mL, Rfl: 3    insulin lispro (HumaLOG) 100 units/mL injection, Inject 5 Units under the skin 3 (three) times a day with meals U-100 pen, Disp: 10 mL, Rfl: 2    labetalol (NORMODYNE) 300 mg tablet, Take 0 5 tablets (150 mg total) by mouth every 8 (eight) hours (Patient taking differently: Take 300 mg by mouth 2 (two) times a day Take 150 mg  In AM, 150 mg  At 2:00 PM and 300 mg  At 10:00 PM ), Disp: 60 tablet, Rfl: 3    NIFEdipine ER (ADALAT CC) 60 MG 24 hr tablet, Take 1 tablet (60 mg total) by mouth 2 (two) times a day Take 30 mg  In AM, take 60 mg  @ 4PM, Disp: 30 tablet, Rfl: 0    ondansetron (ZOFRAN) 4 mg tablet, Take 1 tablet (4 mg total) by mouth every 8 (eight) hours as needed for nausea or vomiting, Disp: 20 tablet, Rfl: 0    oxyCODONE-acetaminophen (PERCOCET) 5-325 mg per tablet, Take 2 tablets by mouth every 4 (four) hours as needed for moderate pain for up to 20 doses Max Daily Amount: 12 tablets, Disp: 28 tablet, Rfl: 0    sodium bicarbonate 650 mg tablet, Take 1 tablet (650 mg total) by mouth 4 (four) times a day, Disp: 120 tablet, Rfl: 6    torsemide (DEMADEX) 20 mg tablet, Take 1 tablet (20 mg total) by mouth 2 (two) times a day Take an extra dose in the afternoon for three days then daily, Disp: 90 tablet, Rfl: 0    Review of Systems   Constitutional: Positive for fatigue  Negative for fever and unexpected weight change  Respiratory: Negative  Cardiovascular: Negative  Gastrointestinal: Positive for abdominal pain, constipation and nausea  Negative for vomiting  Genitourinary: Negative for dysuria, frequency and hematuria  Neurological: Positive for dizziness  Negative for headaches         Objective:    /74 (BP Location: Left arm, Patient Position: Sitting, Cuff Size: Large)   Pulse 83   Temp 98 °F (36 7 °C)   Resp 16   Ht 5' 11" (1 803 m)   Wt 99 3 kg (219 lb)   SpO2 93%   BMI 30 54 kg/m²      Physical Exam   Constitutional: He is oriented to person, place, and time  He appears well-developed and well-nourished  HENT:   Mouth/Throat: Oropharynx is clear and moist    Eyes:   Wears eye cover   Cardiovascular: Normal rate, regular rhythm and normal heart sounds  No LE edema   Pulmonary/Chest: Effort normal and breath sounds normal  No respiratory distress  He has no wheezes  Abdominal: Soft  There is tenderness  There is no rigidity, no rebound and no guarding  Incision sites healing, nonerythamatous  Lower abdominal tender on light palpation   Neurological: He is alert and oriented to person, place, and time  Vitals reviewed        Lab Results   Component Value Date    HGBA1C 5 3 06/06/2018

## 2018-08-09 NOTE — ASSESSMENT & PLAN NOTE
Possibly secondary to percocet vs constipation  Advised to use zofran which he has a prescription of and wean off percocet

## 2018-08-09 NOTE — ASSESSMENT & PLAN NOTE
DM1 with nephropathy - A1c is well controlled at 5 3 on self adjusted dose of Lantus 10u AM and 12u PM and Humalog 5U TID  Patient aware to decrease dose when his appetite is suppressed  He has upcoming appt to establish care with Endo 9/2018

## 2018-08-09 NOTE — ASSESSMENT & PLAN NOTE
Secondary to decreased appetite and percocet  Advised starting colace 100mg bid and senna daily    Patient also encouraged to increase his ambulation

## 2018-08-09 NOTE — ASSESSMENT & PLAN NOTE
Tender lower abd without sign of infection  Advised weaning off percocet, switch to tylenol  Follow up with Surgery

## 2018-08-10 ENCOUNTER — TELEPHONE (OUTPATIENT)
Dept: INTERNAL MEDICINE CLINIC | Facility: CLINIC | Age: 33
End: 2018-08-10

## 2018-08-10 DIAGNOSIS — F33.9 EPISODE OF RECURRENT MAJOR DEPRESSIVE DISORDER, UNSPECIFIED DEPRESSION EPISODE SEVERITY (HCC): ICD-10-CM

## 2018-08-10 RX ORDER — ESCITALOPRAM OXALATE 5 MG/1
10 TABLET ORAL DAILY
Qty: 180 TABLET | Refills: 1 | Status: CANCELLED | OUTPATIENT
Start: 2018-08-10

## 2018-08-10 NOTE — TELEPHONE ENCOUNTER
[address] [place] [address] sent a notification that the Escitalopram 10mg is on manufacture back-order and they asked that another script be sent over to sub/adjust the meds

## 2018-08-10 NOTE — TELEPHONE ENCOUNTER
Called the pharmacy, they were able to do a verbal for 20mg (take 1/2 tablet) until the issue is resolved with the backorder  No new Rx is required

## 2018-08-15 ENCOUNTER — TELEPHONE (OUTPATIENT)
Dept: OTHER | Facility: HOSPITAL | Age: 33
End: 2018-08-15

## 2018-08-15 ENCOUNTER — APPOINTMENT (OUTPATIENT)
Dept: LAB | Facility: CLINIC | Age: 33
End: 2018-08-15
Payer: COMMERCIAL

## 2018-08-15 ENCOUNTER — TRANSCRIBE ORDERS (OUTPATIENT)
Dept: LAB | Facility: CLINIC | Age: 33
End: 2018-08-15

## 2018-08-15 DIAGNOSIS — N18.4 CHRONIC KIDNEY DISEASE, STAGE IV (SEVERE) (HCC): ICD-10-CM

## 2018-08-15 DIAGNOSIS — N18.4 CHRONIC KIDNEY DISEASE, STAGE IV (SEVERE) (HCC): Primary | ICD-10-CM

## 2018-08-15 DIAGNOSIS — E87.5 HYPERKALEMIA: Primary | ICD-10-CM

## 2018-08-15 LAB
ALBUMIN SERPL BCP-MCNC: 3.5 G/DL (ref 3.5–5)
ANION GAP SERPL CALCULATED.3IONS-SCNC: 10 MMOL/L (ref 4–13)
BUN SERPL-MCNC: 54 MG/DL (ref 5–25)
CALCIUM SERPL-MCNC: 9 MG/DL (ref 8.3–10.1)
CHLORIDE SERPL-SCNC: 104 MMOL/L (ref 100–108)
CO2 SERPL-SCNC: 24 MMOL/L (ref 21–32)
CREAT SERPL-MCNC: 7.1 MG/DL (ref 0.6–1.3)
GFR SERPL CREATININE-BSD FRML MDRD: 9 ML/MIN/1.73SQ M
GLUCOSE SERPL-MCNC: 225 MG/DL (ref 65–140)
PHOSPHATE SERPL-MCNC: 5.5 MG/DL (ref 2.7–4.5)
POTASSIUM SERPL-SCNC: 5.9 MMOL/L (ref 3.5–5.3)
SODIUM SERPL-SCNC: 138 MMOL/L (ref 136–145)

## 2018-08-15 PROCEDURE — 80069 RENAL FUNCTION PANEL: CPT

## 2018-08-15 PROCEDURE — 36415 COLL VENOUS BLD VENIPUNCTURE: CPT

## 2018-08-15 RX ORDER — SODIUM POLYSTYRENE SULFONATE 15 G/60ML
15 SUSPENSION ORAL; RECTAL 2 TIMES DAILY
Qty: 500 ML | Refills: 0 | Status: SHIPPED | OUTPATIENT
Start: 2018-08-15 | End: 2018-08-16

## 2018-08-15 NOTE — TELEPHONE ENCOUNTER
Results for Kady Desouza (MRN [id number]) as of 8/15/2018 16:39   Ref  Range 8/15/2018 10:51   Sodium Latest Ref Range: 136 - 145 mmol/L 138   Potassium Latest Ref Range: 3 5 - 5 3 mmol/L 5 9 (H)   Chloride Latest Ref Range: 100 - 108 mmol/L 104   CO2 Latest Ref Range: 21 - 32 mmol/L 24   Anion Gap Latest Ref Range: 4 - 13 mmol/L 10   BUN Latest Ref Range: 5 - 25 mg/dL 54 (H)   Creatinine Latest Ref Range: 0 60 - 1 30 mg/dL 7 10 (H)   Glucose Latest Ref Range: 65 - 140 mg/dL 225 (H)   Calcium Latest Ref Range: 8 3 - 10 1 mg/dL 9 0   Albumin Latest Ref Range: 3 5 - 5 0 g/dL 3 5   eGFR Latest Units: ml/min/1 73sq m 9   Phosphorus Latest Ref Range: 2 7 - 4 5 mg/dL 5 5 (H)    I called Dimitry Penny but he was not picking up is phone therefore I called his mother Kiara Dickey  I told her that Dimtiry Penny should take 1 dose of the Kayexalate and if it was not effective he could take the 2nd dose  Apparently he had 5 days of constipation which may have contributed to the problem  I also instructed Kiara Dickey to please let Dimitry Penny now that if he was having any changes that were concerning that he should come to the emergency room

## 2018-08-16 ENCOUNTER — OFFICE VISIT (OUTPATIENT)
Dept: NEPHROLOGY | Facility: CLINIC | Age: 33
End: 2018-08-16
Payer: COMMERCIAL

## 2018-08-16 VITALS
SYSTOLIC BLOOD PRESSURE: 162 MMHG | WEIGHT: 191 LBS | BODY MASS INDEX: 26.74 KG/M2 | DIASTOLIC BLOOD PRESSURE: 92 MMHG | HEIGHT: 71 IN

## 2018-08-16 DIAGNOSIS — E10.21 TYPE 1 DIABETES MELLITUS WITH DIABETIC NEPHROPATHY, WITH LONG-TERM CURRENT USE OF INSULIN (HCC): Primary | Chronic | ICD-10-CM

## 2018-08-16 DIAGNOSIS — E10.21 TYPE 1 DIABETES MELLITUS WITH DIABETIC NEPHROPATHY, WITH LONG-TERM CURRENT USE OF INSULIN (HCC): Chronic | ICD-10-CM

## 2018-08-16 DIAGNOSIS — N18.4 ANEMIA IN STAGE 4 CHRONIC KIDNEY DISEASE (HCC): ICD-10-CM

## 2018-08-16 DIAGNOSIS — D63.1 ANEMIA IN STAGE 4 CHRONIC KIDNEY DISEASE (HCC): ICD-10-CM

## 2018-08-16 DIAGNOSIS — N18.4 CHRONIC KIDNEY DISEASE, STAGE 4 (SEVERE) (HCC): Primary | Chronic | ICD-10-CM

## 2018-08-16 DIAGNOSIS — E87.5 HYPERKALEMIA: ICD-10-CM

## 2018-08-16 PROCEDURE — 99214 OFFICE O/P EST MOD 30 MIN: CPT | Performed by: INTERNAL MEDICINE

## 2018-08-16 RX ORDER — ONDANSETRON 4 MG/1
4 TABLET, FILM COATED ORAL EVERY 8 HOURS PRN
Qty: 30 TABLET | Refills: 3 | Status: SHIPPED | OUTPATIENT
Start: 2018-08-16 | End: 2019-03-23 | Stop reason: SDUPTHER

## 2018-08-16 NOTE — PATIENT INSTRUCTIONS
1  )  Low 2 g sodium diet    2 )  Monitor weights at home    3 )  Avoid NSAIDs    4 )  Monitor blood pressure at home, call if blood pressure greater than 150/90 persistently    5 ) Repeat Potassium tomorrow

## 2018-08-16 NOTE — PROGRESS NOTES
NEPHROLOGY OFFICE VISIT   Patrice Mckeon 27 y o  male MRN: [id number]  8/16/2018    Reason for Visit: CKD, Hyperkalemia, HTN    ASSESSMENT and PLAN:    I had the pleasure of seeing Leopold Mallet today in the renal clinic for the continued management of advanced chronic kidney disease  Since our last visit, he had a PD catheter imbedded on August 6th by Dr Mary Chapin, and has a follow-up later this afternoon  He reports some nausea no vomiting no metallic taste no tremors in his hands  No chest pain or pleuritic chest pain  We did discuss about starting him on peritoneal dialysis once his PD catheter becomes unroofed  Potassium was noted to be 5 9  Was given a dose of Kayexalate  Also on torsemide  1 )  Chronic kidney disease stage V  -his renal function continues to slowly worsen with a creatinine now at 7 1, BUN of 54  -he has mild uremic symptoms in the setting of generalized fatigue as well as poor appetite  -PD catheter placed by Dr Mary Chapin, August 6th  -he has attended [facility] class  -presumed etiology is diabetic nephropathy and hypertensive nephrosclerosis  -prior nephrologist: Dr Akhil Michael  -avoid NSAIDs  -blood pressure and diabetic control  -avoid Ace inhibitor and ARB due to hyperkalemia  -preferred modality of dialysis is peritoneal dialysis  -has been evaluated by [facility] by Dr David Durham  -following with General surgery for his PD catheter today   -plan to start peritoneal dialysis as soon as his PD catheter is unroofed      2 )  Resistant hypertension  -blood pressure overall improved, he does feel symptomatic when his blood pressure is less than 130 as it makes him feel dizzy and lightheaded  -at this time aim to keep his systolic blood pressure between 140-160  -SANJEEV showed injection fraction of 60%  No foramina ovale  -renal artery Doppler showed no evidence of occlusive disease bilaterally  There is possible intrinsic parenchymal disease of the left kidney    The left renal artery was not able to be evaluated due to bowel gas  -continue hydralazine 100 mg 3 times a day  -continue labetalol 150 mg in the morning and afternoon and 300 mg in the evening  -continue torsemide 20 mg twice a day  -continue nifedipine 60 mg twice a day  - Unable to take any ACE-inhibitor, ARB or aldosterone blocker due to hyperkalemia  -low 2 g sodium diet     3 )  Anemia of chronic kidney disease  -will start Epogen when his hemoglobin drops below 8  -will order iron studies at the next visit     4 )  Mineral bone disorder-chronic kidney disease  -low phosphorus diet  -order PTH and phosphorus at the next visit  -completed a course of ergo calciferol 91197 units, can start vitamin-D 2000 units daily as maintenance     5 )  Hyperhomocystinemia     6 )  History of CVA  -lacunar infarct  -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, 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, underwent plasmapheresis  -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 and also heterozygote for prothrombin gene mutation  -MRI of the brain showed a new area of T2 hyperintensity in the right middle and right inferior cerebellar peduncle with resolution of a focus of hyperintensity in the right super insular region  Suggesting dissemination in time and space    Concern for demyelinating disease      7 )  Diabetes mellitus type 1    8 )  Hyperkalemia  -was given Kayexalate, continue torsemide  -low-potassium diet  -avoid fist clenching during blood draws  -repeat potassium tomorrow    PATIENT INSTRUCTIONS:    Patient Instructions   1 )  Low 2 g sodium diet    2 )  Monitor weights at home    3 )  Avoid NSAIDs    4 )  Monitor blood pressure at home, call if blood pressure greater than 150/90 persistently    5 ) Repeat Potassium tomorrow          Orders Placed This Encounter   Procedures    Potassium     Standing Status:   Future     Standing Expiration Date:   8/16/2019       OBJECTIVE:  Current Weight: Weight - Scale: 86 6 kg (191 lb)  Vitals:    08/16/18 1116   BP: 162/92   Weight: 86 6 kg (191 lb)   Height: 5' 11" (1 803 m)    Body mass index is 26 64 kg/m²  REVIEW OF SYSTEMS:    Review of Systems   Constitutional: Negative for activity change and fever  Respiratory: Negative for cough, chest tightness, shortness of breath and wheezing  Cardiovascular: Negative for chest pain and leg swelling  Gastrointestinal: Negative for abdominal pain, diarrhea, nausea and vomiting  Endocrine: Negative for polyuria  Genitourinary: Negative for difficulty urinating, dysuria, flank pain, frequency and urgency  Skin: Negative for rash  Neurological: Negative for dizziness, syncope, light-headedness and headaches  PHYSICAL EXAM:      Physical Exam   Constitutional: He is oriented to person, place, and time  He appears well-developed and well-nourished  No distress  HENT:   Head: Normocephalic and atraumatic  Eyes: Pupils are equal, round, and reactive to light  No scleral icterus  Neck: Normal range of motion  Neck supple  Cardiovascular: Normal rate, regular rhythm and normal heart sounds  Exam reveals no gallop and no friction rub  No murmur heard  Pulmonary/Chest: Effort normal and breath sounds normal  No respiratory distress  He has no wheezes  He has no rales  He exhibits no tenderness  Abdominal: Soft  Bowel sounds are normal  He exhibits no distension  There is no tenderness  There is no rebound  Musculoskeletal: Normal range of motion  He exhibits no edema  Neurological: He is alert and oriented to person, place, and time  Skin: No rash noted  He is not diaphoretic  Psychiatric: He has a normal mood and affect  Nursing note and vitals reviewed        Medications:    Current Outpatient Prescriptions:     aspirin (ECOTRIN LOW STRENGTH) 81 mg EC tablet, Take 81 mg by mouth daily, Disp: , Rfl:     atorvastatin (LIPITOR) 40 mg tablet, Take 1 tablet (40 mg total) by mouth every evening, Disp: 90 tablet, Rfl: 1    B-D ULTRAFINE III SHORT PEN 31G X 8 MM MISC, Inject under the skin 4 (four) times a day, Disp: 400 each, Rfl: 3    Cholecalciferol (VITAMIN D) 2000 units CAPS, Take 1 capsule by mouth daily, Disp: , Rfl:     cloNIDine (CATAPRES) 0 1 mg tablet, Take 2 tablets (0 2 mg total) by mouth daily at bedtime, Disp: 60 tablet, Rfl: 5    clopidogrel (PLAVIX) 75 mg tablet, Take 1 tablet (75 mg total) by mouth daily, Disp: 90 tablet, Rfl: 1    escitalopram (LEXAPRO) 10 mg tablet, Take 1 tablet (10 mg total) by mouth daily, Disp: 90 tablet, Rfl: 1    folic acid (FOLVITE) 1 mg tablet, Take 1 tablet (1 mg total) by mouth daily, Disp: 90 tablet, Rfl: 1    hydrALAZINE (APRESOLINE) 100 MG tablet, Take 1 tablet (100 mg total) by mouth 3 (three) times a day, Disp: 270 tablet, Rfl: 3    insulin glargine (BASAGLAR KWIKPEN) 100 units/mL injection pen, Inject 10u sc in AM and 12u in PM, Disp: 5 pen, Rfl: 5    insulin lispro (HumaLOG) 100 units/mL injection, Inject 5 Units under the skin 3 (three) times a day with meals U-100 pen, Disp: 10 mL, Rfl: 2    labetalol (NORMODYNE) 300 mg tablet, Take 0 5 tablets (150 mg total) by mouth every 8 (eight) hours (Patient taking differently: Take 300 mg by mouth 2 (two) times a day Take 150 mg  In AM, 150 mg  At 2:00 PM and 300 mg  At 10:00 PM ), Disp: 60 tablet, Rfl: 3    NIFEdipine ER (ADALAT CC) 60 MG 24 hr tablet, Take 1 tablet (60 mg total) by mouth 2 (two) times a day Take 30 mg   In AM, take 60 mg  @ 4PM (Patient taking differently: Take 60 mg by mouth 2 (two) times a day  ), Disp: 30 tablet, Rfl: 0    oxyCODONE-acetaminophen (PERCOCET) 5-325 mg per tablet, Take 2 tablets by mouth every 4 (four) hours as needed for moderate pain for up to 20 doses Max Daily Amount: 12 tablets, Disp: 28 tablet, Rfl: 0    sodium bicarbonate 650 mg tablet, Take 1 tablet (650 mg total) by mouth 4 (four) times a day, Disp: 120 tablet, Rfl: 6    sodium polystyrene sulfonate (KAYEXALATE) 15 g/60 mL suspension, Take 60 mL (15 g total) by mouth 2 (two) times a day for 2 doses If first dose ineffective then take second dose, Disp: 500 mL, Rfl: 0    torsemide (DEMADEX) 20 mg tablet, Take 1 tablet (20 mg total) by mouth 2 (two) times a day Take an extra dose in the afternoon for three days then daily (Patient taking differently: Take 20 mg by mouth 2 (two) times a day  ), Disp: 90 tablet, Rfl: 0    ergocalciferol (ERGOCALCIFEROL) 73649 units capsule, Take 1 capsule (50,000 Units total) by mouth every 30 (thirty) days for 12 days, Disp: 12 capsule, Rfl: 0    ondansetron (ZOFRAN) 4 mg tablet, Take 1 tablet (4 mg total) by mouth every 8 (eight) hours as needed for nausea or vomiting, Disp: 30 tablet, Rfl: 3    Laboratory Results:    Results from last 7 days  Lab Units 08/15/18  1051   SODIUM mmol/L 138   POTASSIUM mmol/L 5 9*   CHLORIDE mmol/L 104   CO2 mmol/L 24   BUN mg/dL 54*   CREATININE mg/dL 7 10*   CALCIUM mg/dL 9 0   PHOSPHORUS mg/dL 5 5*   GLUCOSE RANDOM mg/dL 225*       Results for orders placed or performed in visit on 08/15/18   Renal function panel   Result Value Ref Range    Albumin 3 5 3 5 - 5 0 g/dL    Calcium 9 0 8 3 - 10 1 mg/dL    Phosphorus 5 5 (H) 2 7 - 4 5 mg/dL    Glucose 225 (H) 65 - 140 mg/dL    BUN 54 (H) 5 - 25 mg/dL    Creatinine 7 10 (H) 0 60 - 1 30 mg/dL    Sodium 138 136 - 145 mmol/L    Potassium 5 9 (H) 3 5 - 5 3 mmol/L    Chloride 104 100 - 108 mmol/L    CO2 24 21 - 32 mmol/L    Anion Gap 10 4 - 13 mmol/L    eGFR 9 ml/min/1 73sq m

## 2018-08-16 NOTE — Clinical Note
Thank you for placing a PD catheter in this patient  The plan is to start him on PD as soon as the catheter is unroofed as he does have some mild uremic symptoms  I believe he has a follow-up with you today    Thank you

## 2018-08-17 ENCOUNTER — TELEPHONE (OUTPATIENT)
Dept: OTHER | Facility: HOSPITAL | Age: 33
End: 2018-08-17

## 2018-08-17 ENCOUNTER — APPOINTMENT (OUTPATIENT)
Dept: LAB | Facility: CLINIC | Age: 33
End: 2018-08-17
Payer: COMMERCIAL

## 2018-08-17 ENCOUNTER — TRANSCRIBE ORDERS (OUTPATIENT)
Dept: LAB | Facility: CLINIC | Age: 33
End: 2018-08-17

## 2018-08-17 DIAGNOSIS — E87.5 HYPERKALEMIA: ICD-10-CM

## 2018-08-17 DIAGNOSIS — N18.5 CKD (CHRONIC KIDNEY DISEASE) STAGE 5, GFR LESS THAN 15 ML/MIN (HCC): Primary | ICD-10-CM

## 2018-08-17 NOTE — TELEPHONE ENCOUNTER
I left a message for Carla Dunne but spoke directly to his mother, Gaynelle Lesches  Plan is for peritoneal dialysis catheter to be unroofed on August 27th after which he can begin training for peritoneal dialysis  Recently potassium elevated to 5 9  Responded to 1 dose of Kayexalate  Potassium came down to 4 7  Creatinine 7 05 with a eGFR remaining 9-10  No new symptoms or concerns  Next blood work next Wednesday  I reiterated instructions regarding signs of worsening renal function and if any of which occur he is to go immediately to the emergency room  I spoke with Gulshan Chavez regarding plans for initiation of peritoneal dialysis and training  She will be calling Carla Dunne on Monday 8/20 to affirm plans to start teaching after unroofing on August 27th  His mother Gaynelle Lesches is aware of the plan of care

## 2018-08-22 ENCOUNTER — APPOINTMENT (OUTPATIENT)
Dept: LAB | Facility: CLINIC | Age: 33
End: 2018-08-22
Payer: COMMERCIAL

## 2018-08-22 ENCOUNTER — TELEPHONE (OUTPATIENT)
Dept: OTHER | Facility: HOSPITAL | Age: 33
End: 2018-08-22

## 2018-08-22 DIAGNOSIS — N18.5 CKD (CHRONIC KIDNEY DISEASE) STAGE 5, GFR LESS THAN 15 ML/MIN (HCC): ICD-10-CM

## 2018-08-22 LAB
ANION GAP SERPL CALCULATED.3IONS-SCNC: 14 MMOL/L (ref 4–13)
BUN SERPL-MCNC: 50 MG/DL (ref 5–25)
CALCIUM SERPL-MCNC: 8.6 MG/DL (ref 8.3–10.1)
CHLORIDE SERPL-SCNC: 104 MMOL/L (ref 100–108)
CO2 SERPL-SCNC: 23 MMOL/L (ref 21–32)
CREAT SERPL-MCNC: 6.51 MG/DL (ref 0.6–1.3)
GFR SERPL CREATININE-BSD FRML MDRD: 10 ML/MIN/1.73SQ M
GLUCOSE SERPL-MCNC: 219 MG/DL (ref 65–140)
POTASSIUM SERPL-SCNC: 4.8 MMOL/L (ref 3.5–5.3)
SODIUM SERPL-SCNC: 141 MMOL/L (ref 136–145)

## 2018-08-22 PROCEDURE — 36415 COLL VENOUS BLD VENIPUNCTURE: CPT

## 2018-08-22 PROCEDURE — 80048 BASIC METABOLIC PNL TOTAL CA: CPT

## 2018-08-22 NOTE — TELEPHONE ENCOUNTER
I called Machelle Walker and let him know that his blood work was stable  He will be starting peritoneal dialysis training

## 2018-08-24 ENCOUNTER — OFFICE VISIT (OUTPATIENT)
Dept: NEUROLOGY | Facility: CLINIC | Age: 33
End: 2018-08-24
Payer: COMMERCIAL

## 2018-08-24 VITALS
RESPIRATION RATE: 18 BRPM | DIASTOLIC BLOOD PRESSURE: 58 MMHG | BODY MASS INDEX: 32.94 KG/M2 | SYSTOLIC BLOOD PRESSURE: 128 MMHG | WEIGHT: 236.2 LBS | HEART RATE: 80 BPM

## 2018-08-24 DIAGNOSIS — Z15.89 HOMOZYGOUS MTHFR MUTATION C677T: Chronic | ICD-10-CM

## 2018-08-24 DIAGNOSIS — I51.7 LEFT ATRIAL DILATION: Primary | ICD-10-CM

## 2018-08-24 DIAGNOSIS — H53.30 BINOCULAR VISUAL DISTURBANCE: Chronic | ICD-10-CM

## 2018-08-24 DIAGNOSIS — I10 ACCELERATED HYPERTENSION: ICD-10-CM

## 2018-08-24 DIAGNOSIS — D68.52 HETEROZYGOUS FOR PROTHROMBIN G20210A MUTATION (HCC): ICD-10-CM

## 2018-08-24 DIAGNOSIS — E78.5 DYSLIPIDEMIA: ICD-10-CM

## 2018-08-24 DIAGNOSIS — Z86.73 HISTORY OF LACUNAR CEREBROVASCULAR ACCIDENT (CVA): Chronic | ICD-10-CM

## 2018-08-24 DIAGNOSIS — I15.0 RENOVASCULAR HYPERTENSION: ICD-10-CM

## 2018-08-24 PROCEDURE — 99215 OFFICE O/P EST HI 40 MIN: CPT | Performed by: PSYCHIATRY & NEUROLOGY

## 2018-08-24 RX ORDER — NIFEDIPINE 60 MG/1
60 TABLET, FILM COATED, EXTENDED RELEASE ORAL
Qty: 180 TABLET | Refills: 5 | Status: ON HOLD | OUTPATIENT
Start: 2018-08-24 | End: 2018-09-17

## 2018-08-24 RX ORDER — NIFEDIPINE 60 MG/1
60 TABLET, FILM COATED, EXTENDED RELEASE ORAL
Qty: 60 TABLET | Refills: 3 | OUTPATIENT
Start: 2018-08-24

## 2018-08-24 NOTE — PROGRESS NOTES
Patient ID: Lamin Gray is a 27 y o  male  Assessment/Plan:    No problem-specific Assessment & Plan notes found for this encounter  Diagnoses and all orders for this visit:    Left atrial dilation    Renovascular hypertension    Heterozygous for prothrombin [id number] mutation ([facility] 75 )    Binocular visual disturbance    History of lacunar cerebrovascular accident (CVA)    Homozygous MTHFR mutation C677T ([facility] )    Dyslipidemia  -     Lipid panel; Future    Other orders  -     Discontinue: insulin glargine (BASAGLAR KWIKPEN) 100 units/mL injection pen; Basaglar KwikPen U-100 Insulin 100 unit/mL (3 mL) subcutaneous           Subjective:    MASSIMO mcdermott presents for follow-up with regard to his prior strokes  He reports no new events concerning for recurrent TIA or stroke  He most recently saw  Dr Jose Geronimo and at that point in time she started him on dual anti-platelet therapy for 1 month  He reports that he is actually still taking aspirin  He is having some bruising, and this is occurring at sites were he is injecting insulin peripherally as result of not being able to inject in the abdomen with his new peritoneal dialysis tube in place  He denies any major bleeding  He denies any falls  He takes all his medications as prescribed  He continues to work with the ophthalmology team who reports that he is healing quite well and continue to improve  He also has been told he may be a candidate for renal transplant at this point in time and his family had a question as to whether there would be any issue with him having surgery  Particularly, his cerebral and extracranial vasculature  Were checked in January of 2018 with no severe stenosis detected  His MTHFR mutation should not provide any increased risk per se as he does not have significant hyper homocystinemia  He is heterozygous for a in antithrombin 3 mutation, however this is of unclear clinical significance    He should remain off of an anti-platelet agent as little as possible in general     The following portions of the patient's history were reviewed and updated as appropriate: allergies, current medications, past medical history, past social history, past surgical history and problem list     Social History     Social History    Marital status: Single     Spouse name: N/A    Number of children: N/A    Years of education: N/A     Occupational History          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     Social History Main Topics    Smoking status: Former Smoker     Packs/day: 0 50     Years: 12 00     Types: Cigarettes    Smokeless tobacco: Never Used      Comment: quit 2/2018    Alcohol use Yes      Comment: rarely    Drug use: Yes     Frequency: 3 0 times per week     Types: Marijuana      Comment: weekly-none since 11/2017    Sexual activity: Not on file     Other Topics Concern    Not on file     Social History Narrative    Caffeine use    single       Scheduled Meds:  Continuous Infusions:  No current facility-administered medications for this visit  PRN Meds:  Objective:    Blood pressure 128/58, pulse 80, resp  rate 18, weight 107 kg (236 lb 3 2 oz)  Physical Exam    Neurological Exam      At the time of our evaluation bill was awake, alert, and brightly interactive  He was minimally anxious appearing  He continues to have a dysconjugate gaze with some horizontal diplopia on primary gaze and leftward gaze in particular  His visual fields are decreased in the right upper lower quadrant from the right eye in particular  He also has mild increased sensation in V2 on the right-hand side  Motor testing reveals 5/5 strength in the bilateral upper lower extremities although he has a minimal left upper extremity fix which may just be related to him being right handed  He has a minimal postural tremor  His gait was stable with no ataxia    He has minimal increased temperature sensitivity in the right hand but not the right forearm, and otherwise temperature sensation is intact in the bilateral upper and lower extremities  ROS:    Review of Systems   Constitutional: Positive for appetite change and fatigue  HENT: Negative  Eyes: Negative  Respiratory: Negative  Cardiovascular: Positive for leg swelling  Gastrointestinal: Negative  Endocrine: Negative  Genitourinary: Negative  Musculoskeletal: Positive for gait problem  Skin: Negative  Allergic/Immunologic: Negative  Hematological: Bruises/bleeds easily  Psychiatric/Behavioral: Negative          Total encounter time 40 minutes

## 2018-08-24 NOTE — TELEPHONE ENCOUNTER
Patient follows with Dr Yelitza Zeng and has had multiple med changes recently  Therefore I will defer to him regarding chronic management for hypertension refills and medications

## 2018-08-24 NOTE — PATIENT INSTRUCTIONS
Stroke: Herbie Estefanía with his family for follow-up in regard to his prior strokes and overall cerebral vascular disease  He reports no new symptoms  Concerning for recurrent TIA or stroke  His neurologic exam is stable in the office and reportedly the ophthalmologist is encouraged by his recovery in terms of eye movements  He has not had any falls  He was recently seen by the neuro inflammation team, and at that point in time he was restarted on aspirin for a short stent  He does have some bruising at the peripheral sites where he is injecting insulin, but no severe bleeding  He is currently pending initiation of peritoneal dialysis and may be a candidate for kidney transplant in the future  He takes all of his medication as prescribed  - for secondary stroke prevention he should continue his current combination of Plavix, statin, and appropriate blood pressure and glycemic control  -his most recent hemoglobin A1c is an excellent range and he should continue to treat according to instructions by his   Family physician  - his prior cholesterol check from January of 2018 was out of range, but subsequent to that he began taking atorvastatin  Although his hepatic function panel was checked in May, his cholesterol panel was not repeated at that time  I will request that be performed in order to ensure that he is at goal, and if his cholesterol remains out of a desirable range we will likely increase his atorvastatin  - at this point aspirin can be stopped  - he should continue to be physically active and to avoid tobacco as much as possible     -He should continue his work with the ophthalmology team  - in terms of a kidney transplant, from a cerebral vascular standpoint he should not be at significantly increased risk compared to the average patient and I would not consider his prior strokes to be a contraindication in any way shape or form to receiving a kidney transplant    His cerebral and extracranial arteries are normal, therefore he should not present and exceedingly high surgical risk  He should spend is little time off of his anti-platelet agents as possible  If aspirin as opposed to Plavix would facilitate in easier transition during a demetrio surgical time frame, it would not be unreasonable to treat with aspirin until the time of his transplant  I will defer that to the good judgment of his kidney doctor, but would be happy to provide any assistance necessary  I will plan for him to return to the office to see me in 6 months time but we would be happy to see him sooner if the need should arise  If he has any new stroke-like symptoms including sudden painless loss of vision, difficulty with speaking or swallowing, vertigo /room spinning that does not quickly resolve, weakness /numbness affecting 1 side of the body he should proceed to the nearest emergency room immediately

## 2018-08-27 ENCOUNTER — HOSPITAL ENCOUNTER (OUTPATIENT)
Facility: HOSPITAL | Age: 33
Setting detail: OUTPATIENT SURGERY
Discharge: HOME | End: 2018-08-27
Attending: SURGERY | Admitting: SURGERY
Payer: COMMERCIAL

## 2018-08-27 VITALS
OXYGEN SATURATION: 99 % | RESPIRATION RATE: 18 BRPM | WEIGHT: 210 LBS | DIASTOLIC BLOOD PRESSURE: 94 MMHG | SYSTOLIC BLOOD PRESSURE: 182 MMHG | BODY MASS INDEX: 29.4 KG/M2 | HEART RATE: 87 BPM | HEIGHT: 71 IN | TEMPERATURE: 98.4 F

## 2018-08-27 DIAGNOSIS — Z99.2 PERITONEAL DIALYSIS CATHETER IN PLACE (HCC): Primary | ICD-10-CM

## 2018-08-27 LAB — GLUCOSE SERPL-MCNC: 154 MG/DL (ref 65–140)

## 2018-08-27 PROCEDURE — 82948 REAGENT STRIP/BLOOD GLUCOSE: CPT

## 2018-08-27 RX ORDER — ACETAMINOPHEN 325 MG/1
650 TABLET ORAL EVERY 6 HOURS PRN
Status: DISCONTINUED | OUTPATIENT
Start: 2018-08-27 | End: 2018-08-27 | Stop reason: HOSPADM

## 2018-08-27 RX ORDER — ACETAMINOPHEN 325 MG/1
TABLET ORAL
Qty: 30 TABLET | Refills: 0
Start: 2018-08-27 | End: 2018-11-09 | Stop reason: ALTCHOICE

## 2018-08-27 RX ORDER — LIDOCAINE HYDROCHLORIDE AND EPINEPHRINE 10; 10 MG/ML; UG/ML
INJECTION, SOLUTION INFILTRATION; PERINEURAL AS NEEDED
Status: DISCONTINUED | OUTPATIENT
Start: 2018-08-27 | End: 2018-08-27 | Stop reason: HOSPADM

## 2018-08-27 NOTE — OP NOTE
OPERATIVE REPORT  PATIENT NAME: Sabra Branham    :  1985  MRN: [id number]  Pt Location: AN OR ROOM 02    SURGERY DATE: 2018    Surgeon(s) and Role:     * Geoff Figueroa DO - Primary     * Félix Mendez MD - Assisting    Preop Diagnosis:  Renal failure [N19]    Post-Op Diagnosis Codes:     * Renal failure [N19]    Procedure(s) (LRB):  UNROOF PD CATHETER (N/A)    Specimen(s):  * No specimens in log *    Estimated Blood Loss:   Minimal    Drains:  NG/OG/Enteral Tube Orogastric 18 Fr (Active)   Number of days: 21       Anesthesia Type:   Local    Operative Indications:  Renal failure [N19]      Operative Findings:      Complications:   None    Procedure and Technique:  Patient was brought into the operative suite and placed supine the OR table  Left lower quadrant area where previously buried PD catheter had been placed was prepped and draped in a sterile fashion  Time-out was performed and was assured that the prep was dry  Local was instilled through the left lower quadrant scar skin incision was made  Underlying subcutaneous tissue was divided hot cautery until the catheter was noted  This was exterior eyes  Catheter was flushed with several aliquots of saline  It was capped with 500 U of heparin  Pocket was irrigated out  0 Vicryl was used to close subcutaneous tissues  Four 0 Monocryl was used in an inverted subcuticular individual fashion to close skin wounds and washed and dried  Sterile dressings with Tegaderm were applied  He has an appointment with his PD nurse tomorrow  He returned to the recovery area in stable condition having tolerated the procedure well     I was present for the entire procedure    Patient Disposition:  PACU     SIGNATURE: Geoff Figueroa DO  DATE: 2018  TIME: 3:26 PM

## 2018-08-27 NOTE — DISCHARGE INSTRUCTIONS
Keep appointment with your PD nurse for tomorrow, 08/28/2018    Ice pack as needed to wound    Tylenol as needed for pain    Call Dr Jim Nj, [phone], with questions or concerns

## 2018-09-05 ENCOUNTER — REMOTE DEVICE CLINIC VISIT (OUTPATIENT)
Dept: CARDIOLOGY CLINIC | Facility: CLINIC | Age: 33
End: 2018-09-05
Payer: COMMERCIAL

## 2018-09-05 DIAGNOSIS — Z86.73 PERSONAL HISTORY OF TRANSIENT ISCHEMIC ATTACK: Primary | ICD-10-CM

## 2018-09-05 DIAGNOSIS — Z95.818 PRESENCE OF OTHER CARDIAC IMPLANTS AND GRAFTS: ICD-10-CM

## 2018-09-05 PROCEDURE — 93298 REM INTERROG DEV EVAL SCRMS: CPT | Performed by: INTERNAL MEDICINE

## 2018-09-05 PROCEDURE — 93299 PR REM INTERROG ICPMS/SCRMS <30 D TECH REVIEW: CPT | Performed by: INTERNAL MEDICINE

## 2018-09-05 NOTE — PROGRESS NOTES
MDT LOOP  CARELINK TRANSMISSION: LOOP RECORDER  PRESENTING RHYTHM VS @ 87 BPM  BATTERY STATUS "OK"  NO PATIENT OR DEVICE ACTIVATED EPISODES  NORMAL DEVICE FUNCTION   DL

## 2018-09-08 ENCOUNTER — LAB (OUTPATIENT)
Dept: LAB | Facility: CLINIC | Age: 33
End: 2018-09-08
Payer: COMMERCIAL

## 2018-09-08 DIAGNOSIS — E78.5 DYSLIPIDEMIA: ICD-10-CM

## 2018-09-08 DIAGNOSIS — E10.21 TYPE 1 DIABETES MELLITUS WITH DIABETIC NEPHROPATHY, WITH LONG-TERM CURRENT USE OF INSULIN (HCC): Chronic | ICD-10-CM

## 2018-09-08 LAB
CHOLEST SERPL-MCNC: 85 MG/DL (ref 50–200)
HBA1C MFR BLD HPLC: 4.9 %
HDLC SERPL-MCNC: 35 MG/DL (ref 40–60)
LDLC SERPL CALC-MCNC: 37 MG/DL (ref 0–100)
NONHDLC SERPL-MCNC: 50 MG/DL
TRIGL SERPL-MCNC: 65 MG/DL

## 2018-09-08 PROCEDURE — 83036 HEMOGLOBIN GLYCOSYLATED A1C: CPT

## 2018-09-08 PROCEDURE — 80061 LIPID PANEL: CPT

## 2018-09-08 PROCEDURE — 36415 COLL VENOUS BLD VENIPUNCTURE: CPT

## 2018-09-09 DIAGNOSIS — F33.9 EPISODE OF RECURRENT MAJOR DEPRESSIVE DISORDER, UNSPECIFIED DEPRESSION EPISODE SEVERITY (HCC): Primary | ICD-10-CM

## 2018-09-10 DIAGNOSIS — F33.9 EPISODE OF RECURRENT MAJOR DEPRESSIVE DISORDER, UNSPECIFIED DEPRESSION EPISODE SEVERITY (HCC): ICD-10-CM

## 2018-09-10 RX ORDER — ESCITALOPRAM OXALATE 10 MG/1
10 TABLET ORAL DAILY
Qty: 90 TABLET | Refills: 0 | Status: CANCELLED | OUTPATIENT
Start: 2018-09-10

## 2018-09-10 RX ORDER — ESCITALOPRAM OXALATE 20 MG/1
TABLET ORAL
Qty: 15 TABLET | Refills: 5 | Status: SHIPPED | OUTPATIENT
Start: 2018-09-10 | End: 2018-09-16

## 2018-09-11 ENCOUNTER — OFFICE VISIT (OUTPATIENT)
Dept: MULTI SPECIALTY CLINIC | Facility: CLINIC | Age: 33
End: 2018-09-11
Payer: COMMERCIAL

## 2018-09-11 VITALS
HEART RATE: 86 BPM | BODY MASS INDEX: 28.89 KG/M2 | DIASTOLIC BLOOD PRESSURE: 88 MMHG | SYSTOLIC BLOOD PRESSURE: 148 MMHG | TEMPERATURE: 99.9 F | WEIGHT: 206.35 LBS | HEIGHT: 71 IN

## 2018-09-11 DIAGNOSIS — E10.21 TYPE 1 DIABETES MELLITUS WITH DIABETIC NEPHROPATHY, WITH LONG-TERM CURRENT USE OF INSULIN (HCC): Chronic | ICD-10-CM

## 2018-09-11 DIAGNOSIS — I10 ESSENTIAL HYPERTENSION: Primary | ICD-10-CM

## 2018-09-11 DIAGNOSIS — E78.5 DYSLIPIDEMIA: ICD-10-CM

## 2018-09-11 PROCEDURE — 99213 OFFICE O/P EST LOW 20 MIN: CPT | Performed by: INTERNAL MEDICINE

## 2018-09-11 NOTE — PROGRESS NOTES
Follow Up Alvin Mckeon 26 y o  male MRN: [id number] @ Encounter: [id number]      Assessment/Plan     Assessment: This is a 26y o -year-old male with type 1 diabetes, hypertension and hyperlipidemia  Plan:  1  Type 1 diabetes:  His most recent hemoglobin A1c is 4 9  He presents with no blood sugars or meter for download  He states that he is experiencing fairly regularly occurring hypoglycemia overnight  For this, I have asked him to decrease his dose of Lantus to 10 U at bedtime  I have asked him to check his blood sugars 4 times daily and for the record to the office in 1-2 weeks for review and further adjustment, if necessary  Discussed a dex com continuous glucose monitor at length during the time of the visit  Provided supplemental educational materials and the name of local representative for further information  I have also asked him to take part in a dex com continuous glucose monitor diagnostic to gain further insight into his glycemic control throughout the day especially as he progresses through peritoneal dialysis  Check hemoglobin A1c and comprehensive metabolic panel prior to next visit  2   Hypertension:  His blood pressure is elevated in the office today, however, he has had hypertensive crisis in the recent past   He follows cardiology closely  For now, continue current regimen and continue to monitor blood pressure regularly  3   Dyslipidemia:   Continue atorvastatin  CC:   Type 1 Diabetes clinic consult    History of Present Illness     HPI: 26 y o  male presents in the clinic today for initial consult of Type 1 Diabetes  He states he has been diagnosed with type 1 diabetes for 32 years  He is checking his blood sugars 4 times daily  He denies any recent episodes of hypoglycemia, polydipsia, polyuria or polyphagia  Recently started peritoneal dialysis last week  He also states that he has had a decreased appetite   His most recent hemoglobin A1c from September 8, 2018 is 4 9  Current Diabetic medication regimen is as follows:  Lantus 11 in AM and 12 at night  Humalog 5 units with sliding scale of 1 unit for every 25 over 150  Will need to switch to Admelog due to insurance  Suffered CVA in January 2018 and has has eye problems since including retinopathy  he states he is up to date with his diabetic eye exam   he has no complaints about his feet and does follow podiatry for regular diabetic foot care  For his hypertension, he is treated with labetalol 300 mg twice daily, hydralazine 100 mg 3 times daily and Nifedipine ER 60 mg 2 times daily  His dyslipidemia is treated with atorvastatin 40 mg daily  Consults    Review of Systems   Constitutional: Positive for fatigue ( at times)  Negative for chills and fever  HENT: Negative  Eyes: Positive for visual disturbance (Right eye post CVA)  Negative for photophobia, pain, discharge, redness and itching  Respiratory: Negative for cough and shortness of breath  Cardiovascular: Negative for chest pain and palpitations  Gastrointestinal: Positive for nausea ( at times)  Negative for abdominal pain, constipation, diarrhea and vomiting  Endocrine: Negative for cold intolerance, heat intolerance, polydipsia, polyphagia and polyuria  Genitourinary: Negative  Musculoskeletal: Negative  Skin: Negative  Allergic/Immunologic: Negative  Neurological: Positive for dizziness ( with hypoglycemia)  Negative for syncope, light-headedness and headaches  Hematological: Negative  Psychiatric/Behavioral: Negative  All other systems reviewed and are negative        Historical Information   Past Medical History:   Diagnosis Date    [facility]     Cerebellar stroke side undetermined 2015 2015,1/2018    Diabetes type 1, controlled ([facility]     IDDM    History of shingles 2010    History of transfusion 02/2018    Hypertension     Muscle weakness     general unsteadiness Past Surgical History:   Procedure Laterality Date    CARDIAC LOOP RECORDER  05/2018    PERITONEAL CATHETER INSERTION N/A 8/27/2018    Procedure: UNROOF PD CATHETER;  Surgeon: Gulshan Fam DO;  [facility];  Service: General    SD LAP INSERTION TUNNELED INTRAPERITONEAL CATHETER N/A 8/6/2018    Procedure: LAPAROSCOPIC PD CATHETER PLACEMENT;  Surgeon: Gulshan Fam DO;  [facility];  Service: General    TONSILLECTOMY       Social History   History   Alcohol Use    Yes     Comment: rarely     History   Drug Use    Frequency: 3 0 times per week    Types: Marijuana     Comment: weekly-none since 11/2017     History   Smoking Status    Former Smoker    Packs/day: 0 50    Years: 12 00    Types: Cigarettes   Smokeless Tobacco    Never Used     Comment: quit 2/2018     Family History:   Family History   Problem Relation Age of Onset    Breast cancer Mother     Hypertension Mother     Hyperlipidemia Father     Hypertension Father     Leukemia Maternal Grandmother     Hyperlipidemia Maternal Grandfather     Hypertension Maternal Grandfather     Hyperlipidemia Paternal Grandmother     Hypertension Paternal Grandmother     Heart disease Paternal Grandfather         cardiac disorder    Diabetes Paternal Grandfather        Meds/Allergies   Current Outpatient Prescriptions   Medication Sig Dispense Refill    acetaminophen (TYLENOL) 325 mg tablet Take 650 mg tylenol as needed every 6-8 hours for pain   30 tablet 0    atorvastatin (LIPITOR) 40 mg tablet Take 1 tablet (40 mg total) by mouth every evening 90 tablet 1    B-D ULTRAFINE III SHORT PEN 31G X 8 MM MISC Inject under the skin 4 (four) times a day 400 each 3    Cholecalciferol (VITAMIN D) 2000 units CAPS Take 1 capsule by mouth daily      cloNIDine (CATAPRES) 0 1 mg tablet Take 2 tablets (0 2 mg total) by mouth daily at bedtime 60 tablet 5    clopidogrel (PLAVIX) 75 mg tablet Take 1 tablet (75 mg total) by mouth daily 90 tablet 1    ergocalciferol (ERGOCALCIFEROL) 01064 units capsule Take 1 capsule (50,000 Units total) by mouth every 30 (thirty) days for 12 days 12 capsule 0    escitalopram (LEXAPRO) 10 mg tablet Take 1 tablet (10 mg total) by mouth daily 90 tablet 1    escitalopram (LEXAPRO) 20 mg tablet TAKE 1/2 (ONE-HALF) TABLET BY MOUTH ONCE DAILY 15 tablet 5    folic acid (FOLVITE) 1 mg tablet Take 1 tablet (1 mg total) by mouth daily 90 tablet 1    hydrALAZINE (APRESOLINE) 100 MG tablet Take 1 tablet (100 mg total) by mouth 3 (three) times a day 270 tablet 3    insulin glargine (BASAGLAR KWIKPEN) 100 units/mL injection pen Inject 10u sc in AM and 12u in PM 5 pen 5    insulin lispro (HumaLOG) 100 units/mL injection Inject 5 Units under the skin 3 (three) times a day with meals U-100 pen 10 mL 2    labetalol (NORMODYNE) 300 mg tablet Take 0 5 tablets (150 mg total) by mouth every 8 (eight) hours (Patient taking differently: Take 300 mg by mouth 2 (two) times a day Take 150 mg  In AM, 150 mg  At 2:00 PM and 300 mg  At 10:00 PM ) 60 tablet 3    NIFEdipine ER (ADALAT CC) 60 MG 24 hr tablet Take 1 tablet (60 mg total) by mouth 2 (two) times a day 180 tablet 5    ondansetron (ZOFRAN) 4 mg tablet Take 1 tablet (4 mg total) by mouth every 8 (eight) hours as needed for nausea or vomiting 30 tablet 3    oxyCODONE-acetaminophen (PERCOCET) 5-325 mg per tablet Take 2 tablets by mouth every 4 (four) hours as needed for moderate pain for up to 20 doses Max Daily Amount: 12 tablets 28 tablet 0    sodium bicarbonate 650 mg tablet Take 1 tablet (650 mg total) by mouth 4 (four) times a day 120 tablet 6    torsemide (DEMADEX) 20 mg tablet Take 1 tablet (20 mg total) by mouth 2 (two) times a day Take an extra dose in the afternoon for three days then daily (Patient taking differently: Take 20 mg by mouth 2 (two) times a day  ) 90 tablet 0     No current facility-administered medications for this visit        Allergies   Allergen Reactions    Sulfa Antibiotics Rash       Objective       Invasive Devices     Drain            NG/OG/Enteral Tube Orogastric 18 Fr 36 days                Physical Exam   Constitutional: He is oriented to person, place, and time  He appears well-developed and well-nourished  No distress  HENT:   Head: Normocephalic and atraumatic  Nose: Nose normal    Mouth/Throat: Oropharynx is clear and moist    Eyes: Conjunctivae and EOM are normal  Pupils are equal, round, and reactive to light  Right eye exhibits no discharge  Left eye exhibits no discharge  Wears glasses with prism to right eye lens   Neck: Normal range of motion  Neck supple  No JVD present  No tracheal deviation present  No thyromegaly present  Cardiovascular: Normal rate, regular rhythm, normal heart sounds and intact distal pulses  Exam reveals no gallop and no friction rub  No murmur heard  Pulmonary/Chest: Effort normal and breath sounds normal  No stridor  No respiratory distress  He has no wheezes  He has no rales  He exhibits no tenderness  Abdominal: Soft  Bowel sounds are normal  He exhibits no distension  There is tenderness (Slight)  Peritoneal dialysis   Musculoskeletal: Normal range of motion  He exhibits no edema, tenderness or deformity  Lymphadenopathy:     He has no cervical adenopathy  Neurological: He is alert and oriented to person, place, and time  Skin: Skin is warm and dry  No rash noted  No erythema  No pallor  Dry skin   Psychiatric: He has a normal mood and affect  His behavior is normal  Judgment and thought content normal    Vitals reviewed      Lab Results:     Results from last 7 days  Lab Units 09/08/18   HEMOGLOBIN A1C  4 9     Lab Results   Component Value Date    WBC 5 71 07/16/2018    HGB 7 8 (L) 07/16/2018    HCT 23 9 (L) 07/16/2018    MCV 90 07/16/2018     07/16/2018     Lab Results   Component Value Date/Time    BUN 50 (H) 08/22/2018 10:58 AM    BUN 34 (H) 11/03/2015 11:32 AM     08/22/2018 10:58 AM     10/28/2015 05:43 PM    K 4 8 08/22/2018 10:58 AM    K 3 6 10/28/2015 05:43 PM     08/22/2018 10:58 AM     10/28/2015 05:43 PM    CO2 23 08/22/2018 10:58 AM    CO2 27 10/28/2015 05:43 PM    CREATININE 6 51 (H) 08/22/2018 10:58 AM    CREATININE 1 90 (H) 11/03/2015 11:33 AM    AST 15 05/29/2018 06:32 AM    AST 10 10/28/2015 05:43 PM    ALT 20 05/29/2018 06:32 AM    ALT 25 10/28/2015 05:43 PM    ALB 3 5 08/15/2018 10:51 AM    ALB 4 0 10/28/2015 05:43 PM     No results for input(s): CHOL, HDL, LDL, TRIG, VLDL in the last 72 hours  No results found for: Zhane Morocho  POC Glucose (mg/dl)   Date Value   08/27/2018 154 (H)   08/06/2018 258 (H)   08/06/2018 218 (H)   02/19/2018 279 (H)   02/19/2018 167 (H)   02/18/2018 273 (H)   02/18/2018 266 (H)   02/18/2018 294 (H)   02/18/2018 273 (H)   02/17/2018 254 (H)     Portions of the record may have been created with voice recognition software

## 2018-09-11 NOTE — PATIENT INSTRUCTIONS
Be mindful of diet  Stay active and stay hydrated  For your overnight hypoglycemia, please decrease your Lantus 10 units at bedtime  Check your blood sugars regularly  Send a record of your blood sugars to the office in 2 weeks for review and further adjustment, if necessary  Continue your antihypertensive medication regimen  Continue atorvastatin  Consider DEX COM continue glucose monitor as discussed  Evans Green RN is the local representative

## 2018-09-12 LAB
EST. AVERAGE GLUCOSE BLD GHB EST-MCNC: 94 MG/DL
HBA1C MFR BLD: 4.9 % (ref 4.2–6.3)

## 2018-09-13 ENCOUNTER — TELEPHONE (OUTPATIENT)
Dept: NEUROLOGY | Facility: CLINIC | Age: 33
End: 2018-09-13

## 2018-09-13 NOTE — TELEPHONE ENCOUNTER
----- Message from Ira Suazo MD sent at 9/12/2018 10:52 PM EDT -----  The cholesterol panel is significantly improved indeed  The HDL remains a little low, but the other numbers are terrific  Will continue on current therapy

## 2018-09-13 NOTE — PROGRESS NOTES
The cholesterol panel is significantly improved indeed  The HDL remains a little low, but the other numbers are terrific  Will continue on current therapy

## 2018-09-16 ENCOUNTER — HOSPITAL ENCOUNTER (INPATIENT)
Facility: HOSPITAL | Age: 33
LOS: 1 days | Discharge: HOME | DRG: 422 | End: 2018-09-17
Attending: EMERGENCY MEDICINE | Admitting: INTERNAL MEDICINE
Payer: COMMERCIAL

## 2018-09-16 ENCOUNTER — APPOINTMENT (EMERGENCY)
Dept: CT IMAGING | Facility: HOSPITAL | Age: 33
DRG: 422 | End: 2018-09-16
Payer: COMMERCIAL

## 2018-09-16 DIAGNOSIS — N18.3 ACUTE RENAL FAILURE SUPERIMPOSED ON STAGE 3 CHRONIC KIDNEY DISEASE, UNSPECIFIED ACUTE RENAL FAILURE TYPE: ICD-10-CM

## 2018-09-16 DIAGNOSIS — H53.8 BLURRED VISION: ICD-10-CM

## 2018-09-16 DIAGNOSIS — I63.9 CEREBROVASCULAR ACCIDENT (CVA) OF LEFT PONTINE STRUCTURE (HCC): ICD-10-CM

## 2018-09-16 DIAGNOSIS — R80.1 PERSISTENT PROTEINURIA: ICD-10-CM

## 2018-09-16 DIAGNOSIS — N18.2 ACUTE RENAL FAILURE WITH ACUTE TUBULAR NECROSIS SUPERIMPOSED ON STAGE 2 CHRONIC KIDNEY DISEASE (HCC): ICD-10-CM

## 2018-09-16 DIAGNOSIS — I10 ACCELERATED HYPERTENSION: ICD-10-CM

## 2018-09-16 DIAGNOSIS — N18.30 ACUTE RENAL FAILURE WITH ACUTE TUBULAR NECROSIS SUPERIMPOSED ON STAGE 3 CHRONIC KIDNEY DISEASE (HCC): ICD-10-CM

## 2018-09-16 DIAGNOSIS — Z99.2 PERITONEAL DIALYSIS CATHETER IN PLACE (HCC): ICD-10-CM

## 2018-09-16 DIAGNOSIS — I16.0 HYPERTENSIVE URGENCY: ICD-10-CM

## 2018-09-16 DIAGNOSIS — G35 OPTIC NEURITIS DUE TO MULTIPLE SCLEROSIS (HCC): ICD-10-CM

## 2018-09-16 DIAGNOSIS — N17.9 ACUTE RENAL FAILURE SUPERIMPOSED ON STAGE 3 CHRONIC KIDNEY DISEASE, UNSPECIFIED ACUTE RENAL FAILURE TYPE: ICD-10-CM

## 2018-09-16 DIAGNOSIS — R11.0 NAUSEA: ICD-10-CM

## 2018-09-16 DIAGNOSIS — N17.0 ACUTE RENAL FAILURE WITH ACUTE TUBULAR NECROSIS SUPERIMPOSED ON STAGE 2 CHRONIC KIDNEY DISEASE (HCC): ICD-10-CM

## 2018-09-16 DIAGNOSIS — E55.9 VITAMIN D DEFICIENCY: ICD-10-CM

## 2018-09-16 DIAGNOSIS — H46.9 OPTIC NEURITIS DUE TO MULTIPLE SCLEROSIS (HCC): ICD-10-CM

## 2018-09-16 DIAGNOSIS — E72.11 HYPERHOMOCYSTEINEMIA (HCC): ICD-10-CM

## 2018-09-16 DIAGNOSIS — I63.9 CEREBROVASCULAR ACCIDENT (CVA), UNSPECIFIED MECHANISM (HCC): ICD-10-CM

## 2018-09-16 DIAGNOSIS — Z86.73 HISTORY OF LACUNAR CEREBROVASCULAR ACCIDENT (CVA): ICD-10-CM

## 2018-09-16 DIAGNOSIS — R11.2 NAUSEA & VOMITING: ICD-10-CM

## 2018-09-16 DIAGNOSIS — I10 ESSENTIAL HYPERTENSION: ICD-10-CM

## 2018-09-16 DIAGNOSIS — E10.21 TYPE 1 DIABETES MELLITUS WITH DIABETIC NEPHROPATHY, WITH LONG-TERM CURRENT USE OF INSULIN (HCC): ICD-10-CM

## 2018-09-16 DIAGNOSIS — N17.0 ACUTE RENAL FAILURE WITH ACUTE TUBULAR NECROSIS SUPERIMPOSED ON STAGE 3 CHRONIC KIDNEY DISEASE (HCC): ICD-10-CM

## 2018-09-16 DIAGNOSIS — I15.0 RENOVASCULAR HYPERTENSION: ICD-10-CM

## 2018-09-16 DIAGNOSIS — E83.39 HYPERPHOSPHATEMIA: ICD-10-CM

## 2018-09-16 DIAGNOSIS — N17.9 ACUTE KIDNEY INJURY (HCC): ICD-10-CM

## 2018-09-16 DIAGNOSIS — H53.30 BINOCULAR VISUAL DISTURBANCE: ICD-10-CM

## 2018-09-16 DIAGNOSIS — H51.0 BINOCULAR VISION DISORDER WITH CONJUGATE GAZE PALSY: ICD-10-CM

## 2018-09-16 DIAGNOSIS — R79.89 AZOTEMIA: ICD-10-CM

## 2018-09-16 DIAGNOSIS — E86.0 DEHYDRATION: Primary | ICD-10-CM

## 2018-09-16 LAB
ALBUMIN SERPL BCP-MCNC: 3.7 G/DL (ref 3.5–5)
ALP SERPL-CCNC: 79 U/L (ref 46–116)
ALT SERPL W P-5'-P-CCNC: 21 U/L (ref 12–78)
ANION GAP SERPL CALCULATED.3IONS-SCNC: 10 MMOL/L (ref 4–13)
AST SERPL W P-5'-P-CCNC: 17 U/L (ref 5–45)
BACTERIA UR QL AUTO: ABNORMAL /HPF
BASOPHILS # BLD AUTO: 0.07 THOUSANDS/ΜL (ref 0–0.1)
BASOPHILS NFR BLD AUTO: 1 % (ref 0–1)
BILIRUB SERPL-MCNC: 0.4 MG/DL (ref 0.2–1)
BILIRUB UR QL STRIP: NEGATIVE
BUN SERPL-MCNC: 48 MG/DL (ref 5–25)
CALCIUM SERPL-MCNC: 9.1 MG/DL (ref 8.3–10.1)
CHLORIDE SERPL-SCNC: 96 MMOL/L (ref 100–108)
CLARITY UR: CLEAR
CO2 SERPL-SCNC: 32 MMOL/L (ref 21–32)
COLOR UR: YELLOW
CREAT SERPL-MCNC: 11.74 MG/DL (ref 0.6–1.3)
EOSINOPHIL # BLD AUTO: 0.29 THOUSAND/ΜL (ref 0–0.61)
EOSINOPHIL NFR BLD AUTO: 3 % (ref 0–6)
ERYTHROCYTE [DISTWIDTH] IN BLOOD BY AUTOMATED COUNT: 14.2 % (ref 11.6–15.1)
GFR SERPL CREATININE-BSD FRML MDRD: 5 ML/MIN/1.73SQ M
GLUCOSE SERPL-MCNC: 191 MG/DL (ref 65–140)
GLUCOSE SERPL-MCNC: 197 MG/DL (ref 65–140)
GLUCOSE SERPL-MCNC: 91 MG/DL (ref 65–140)
GLUCOSE UR STRIP-MCNC: ABNORMAL MG/DL
HCT VFR BLD AUTO: 31.2 % (ref 36.5–49.3)
HGB BLD-MCNC: 10.7 G/DL (ref 12–17)
HGB UR QL STRIP.AUTO: NEGATIVE
HYALINE CASTS #/AREA URNS LPF: ABNORMAL /LPF
IMM GRANULOCYTES # BLD AUTO: 0.01 THOUSAND/UL (ref 0–0.2)
IMM GRANULOCYTES NFR BLD AUTO: 0 % (ref 0–2)
INR PPP: 1.05 (ref 0.86–1.17)
KETONES UR STRIP-MCNC: NEGATIVE MG/DL
LACTATE SERPL-SCNC: 1 MMOL/L (ref 0.5–2)
LEUKOCYTE ESTERASE UR QL STRIP: NEGATIVE
LIPASE SERPL-CCNC: 110 U/L (ref 73–393)
LYMPHOCYTES # BLD AUTO: 1.72 THOUSANDS/ΜL (ref 0.6–4.47)
LYMPHOCYTES NFR BLD AUTO: 20 % (ref 14–44)
MAGNESIUM SERPL-MCNC: 2.4 MG/DL (ref 1.6–2.6)
MCH RBC QN AUTO: 30.8 PG (ref 26.8–34.3)
MCHC RBC AUTO-ENTMCNC: 34.3 G/DL (ref 31.4–37.4)
MCV RBC AUTO: 90 FL (ref 82–98)
MONOCYTES # BLD AUTO: 0.65 THOUSAND/ΜL (ref 0.17–1.22)
MONOCYTES NFR BLD AUTO: 8 % (ref 4–12)
NEUTROPHILS # BLD AUTO: 5.94 THOUSANDS/ΜL (ref 1.85–7.62)
NEUTS SEG NFR BLD AUTO: 68 % (ref 43–75)
NITRITE UR QL STRIP: NEGATIVE
NON-SQ EPI CELLS URNS QL MICRO: ABNORMAL /HPF
NRBC BLD AUTO-RTO: 0 /100 WBCS
PH UR STRIP.AUTO: 6.5 [PH] (ref 4.5–8)
PLATELET # BLD AUTO: 384 THOUSANDS/UL (ref 149–390)
PMV BLD AUTO: 10.2 FL (ref 8.9–12.7)
POTASSIUM SERPL-SCNC: 3.4 MMOL/L (ref 3.5–5.3)
PROT SERPL-MCNC: 7 G/DL (ref 6.4–8.2)
PROT UR STRIP-MCNC: >=300 MG/DL
PROTHROMBIN TIME: 13.4 SECONDS (ref 11.8–14.2)
RBC # BLD AUTO: 3.47 MILLION/UL (ref 3.88–5.62)
RBC #/AREA URNS AUTO: ABNORMAL /HPF
SODIUM SERPL-SCNC: 138 MMOL/L (ref 136–145)
SP GR UR STRIP.AUTO: 1.02 (ref 1–1.03)
UROBILINOGEN UR QL STRIP.AUTO: 0.2 E.U./DL
WBC # BLD AUTO: 8.68 THOUSAND/UL (ref 4.31–10.16)
WBC #/AREA URNS AUTO: ABNORMAL /HPF

## 2018-09-16 PROCEDURE — 99220 PR INITIAL OBSERVATION CARE/DAY 70 MINUTES: CPT | Performed by: HOSPITALIST

## 2018-09-16 PROCEDURE — 96361 HYDRATE IV INFUSION ADD-ON: CPT

## 2018-09-16 PROCEDURE — 83605 ASSAY OF LACTIC ACID: CPT | Performed by: EMERGENCY MEDICINE

## 2018-09-16 PROCEDURE — 99284 EMERGENCY DEPT VISIT MOD MDM: CPT

## 2018-09-16 PROCEDURE — 81001 URINALYSIS AUTO W/SCOPE: CPT

## 2018-09-16 PROCEDURE — 36415 COLL VENOUS BLD VENIPUNCTURE: CPT | Performed by: EMERGENCY MEDICINE

## 2018-09-16 PROCEDURE — 85025 COMPLETE CBC W/AUTO DIFF WBC: CPT | Performed by: EMERGENCY MEDICINE

## 2018-09-16 PROCEDURE — 82948 REAGENT STRIP/BLOOD GLUCOSE: CPT

## 2018-09-16 PROCEDURE — 85610 PROTHROMBIN TIME: CPT | Performed by: EMERGENCY MEDICINE

## 2018-09-16 PROCEDURE — 83690 ASSAY OF LIPASE: CPT | Performed by: EMERGENCY MEDICINE

## 2018-09-16 PROCEDURE — 74176 CT ABD & PELVIS W/O CONTRAST: CPT

## 2018-09-16 PROCEDURE — 96374 THER/PROPH/DIAG INJ IV PUSH: CPT

## 2018-09-16 PROCEDURE — 83735 ASSAY OF MAGNESIUM: CPT | Performed by: EMERGENCY MEDICINE

## 2018-09-16 PROCEDURE — 80053 COMPREHEN METABOLIC PANEL: CPT | Performed by: EMERGENCY MEDICINE

## 2018-09-16 RX ORDER — CLOPIDOGREL BISULFATE 75 MG/1
75 TABLET ORAL DAILY
Status: DISCONTINUED | OUTPATIENT
Start: 2018-09-17 | End: 2018-09-16

## 2018-09-16 RX ORDER — LABETALOL 100 MG/1
150 TABLET, FILM COATED ORAL 2 TIMES DAILY
Status: DISCONTINUED | OUTPATIENT
Start: 2018-09-16 | End: 2018-09-17 | Stop reason: HOSPADM

## 2018-09-16 RX ORDER — FOLIC ACID 1 MG/1
1 TABLET ORAL DAILY
Status: DISCONTINUED | OUTPATIENT
Start: 2018-09-17 | End: 2018-09-17 | Stop reason: HOSPADM

## 2018-09-16 RX ORDER — SODIUM CHLORIDE, SODIUM GLUCONATE, SODIUM ACETATE, POTASSIUM CHLORIDE, MAGNESIUM CHLORIDE, SODIUM PHOSPHATE, DIBASIC, AND POTASSIUM PHOSPHATE .53; .5; .37; .037; .03; .012; .00082 G/100ML; G/100ML; G/100ML; G/100ML; G/100ML; G/100ML; G/100ML
100 INJECTION, SOLUTION INTRAVENOUS CONTINUOUS
Status: DISPENSED | OUTPATIENT
Start: 2018-09-16 | End: 2018-09-17

## 2018-09-16 RX ORDER — ESCITALOPRAM OXALATE 10 MG/1
10 TABLET ORAL DAILY
Status: DISCONTINUED | OUTPATIENT
Start: 2018-09-17 | End: 2018-09-17 | Stop reason: HOSPADM

## 2018-09-16 RX ORDER — ATORVASTATIN CALCIUM 40 MG/1
40 TABLET, FILM COATED ORAL EVERY EVENING
Status: DISCONTINUED | OUTPATIENT
Start: 2018-09-17 | End: 2018-09-16

## 2018-09-16 RX ORDER — NIFEDIPINE 30 MG/1
60 TABLET, EXTENDED RELEASE ORAL DAILY
Status: DISCONTINUED | OUTPATIENT
Start: 2018-09-17 | End: 2018-09-17 | Stop reason: HOSPADM

## 2018-09-16 RX ORDER — ONDANSETRON 2 MG/ML
4 INJECTION INTRAMUSCULAR; INTRAVENOUS ONCE
Status: COMPLETED | OUTPATIENT
Start: 2018-09-16 | End: 2018-09-16

## 2018-09-16 RX ORDER — HYDRALAZINE HYDROCHLORIDE 25 MG/1
50 TABLET, FILM COATED ORAL 3 TIMES DAILY
Status: DISCONTINUED | OUTPATIENT
Start: 2018-09-16 | End: 2018-09-17 | Stop reason: HOSPADM

## 2018-09-16 RX ORDER — INSULIN GLARGINE 100 [IU]/ML
10 INJECTION, SOLUTION SUBCUTANEOUS EVERY 12 HOURS SCHEDULED
Status: DISCONTINUED | OUTPATIENT
Start: 2018-09-16 | End: 2018-09-17 | Stop reason: HOSPADM

## 2018-09-16 RX ORDER — ONDANSETRON 2 MG/ML
4 INJECTION INTRAMUSCULAR; INTRAVENOUS EVERY 4 HOURS PRN
Status: DISCONTINUED | OUTPATIENT
Start: 2018-09-16 | End: 2018-09-17 | Stop reason: HOSPADM

## 2018-09-16 RX ORDER — CLOPIDOGREL BISULFATE 75 MG/1
75 TABLET ORAL DAILY
Status: DISCONTINUED | OUTPATIENT
Start: 2018-09-16 | End: 2018-09-17 | Stop reason: HOSPADM

## 2018-09-16 RX ORDER — SODIUM CHLORIDE 9 MG/ML
100 INJECTION, SOLUTION INTRAVENOUS CONTINUOUS
Status: DISCONTINUED | OUTPATIENT
Start: 2018-09-16 | End: 2018-09-16

## 2018-09-16 RX ORDER — SODIUM BICARBONATE 650 MG/1
650 TABLET ORAL 4 TIMES DAILY
Status: DISCONTINUED | OUTPATIENT
Start: 2018-09-16 | End: 2018-09-17 | Stop reason: HOSPADM

## 2018-09-16 RX ORDER — ATORVASTATIN CALCIUM 40 MG/1
40 TABLET, FILM COATED ORAL EVERY EVENING
Status: DISCONTINUED | OUTPATIENT
Start: 2018-09-16 | End: 2018-09-17 | Stop reason: HOSPADM

## 2018-09-16 RX ADMIN — SODIUM CHLORIDE, SODIUM GLUCONATE, SODIUM ACETATE, POTASSIUM CHLORIDE, MAGNESIUM CHLORIDE, SODIUM PHOSPHATE, DIBASIC, AND POTASSIUM PHOSPHATE 100 ML/HR: .53; .5; .37; .037; .03; .012; .00082 INJECTION, SOLUTION INTRAVENOUS at 16:52

## 2018-09-16 RX ADMIN — SODIUM BICARBONATE 650 MG TABLET 650 MG: at 21:16

## 2018-09-16 RX ADMIN — CALCIUM ACETATE 667 MG: 667 CAPSULE ORAL at 16:59

## 2018-09-16 RX ADMIN — HYDRALAZINE HYDROCHLORIDE 50 MG: 25 TABLET ORAL at 21:15

## 2018-09-16 RX ADMIN — INSULIN GLARGINE 10 UNITS: 100 INJECTION, SOLUTION SUBCUTANEOUS at 21:16

## 2018-09-16 RX ADMIN — ONDANSETRON 4 MG: 2 INJECTION INTRAMUSCULAR; INTRAVENOUS at 10:57

## 2018-09-16 RX ADMIN — ATORVASTATIN CALCIUM 40 MG: 40 TABLET, FILM COATED ORAL at 16:59

## 2018-09-16 RX ADMIN — INSULIN LISPRO 2 UNITS: 100 INJECTION, SOLUTION INTRAVENOUS; SUBCUTANEOUS at 21:16

## 2018-09-16 RX ADMIN — SODIUM BICARBONATE 650 MG TABLET 650 MG: at 17:00

## 2018-09-16 RX ADMIN — INSULIN LISPRO 5 UNITS: 100 INJECTION, SOLUTION INTRAVENOUS; SUBCUTANEOUS at 16:52

## 2018-09-16 RX ADMIN — LABETALOL HYDROCHLORIDE 150 MG: 100 TABLET, FILM COATED ORAL at 16:59

## 2018-09-16 RX ADMIN — CLOPIDOGREL 75 MG: 75 TABLET, FILM COATED ORAL at 16:59

## 2018-09-16 RX ADMIN — INSULIN LISPRO 1 UNITS: 100 INJECTION, SOLUTION INTRAVENOUS; SUBCUTANEOUS at 16:52

## 2018-09-16 RX ADMIN — SODIUM CHLORIDE 1000 ML: 0.9 INJECTION, SOLUTION INTRAVENOUS at 10:57

## 2018-09-16 NOTE — ASSESSMENT & PLAN NOTE
Labetalol 150mg qam, afternoon; 300mg in the evening  Hydralazine 100mg TID  Nifedipine 60mg BID  Hold torsemide

## 2018-09-16 NOTE — H&P
H&P- Franky Mathews 1985, 26 y o  male MRN: [id number]    Unit/Bed#: ANGELA Encounter: [id number]    Primary Care Provider: Mariella Solomon DO   Date and time admitted to hospital: 9/16/2018 10:23 AM    * Acute kidney injury [facility])   Assessment & Plan    Recently started on manual PD QID  C/o increased fullness  Poor oral intake  Decreased UOP  Suspected prerenal injury   Hold further PD  Consult nephrology  IVF; trend BMP            Peritoneal dialysis catheter in place [facility])   Assessment & Plan    Plan as above; appreciate nephrology input         Nausea and vomiting   Assessment & Plan    Since last Friday  No f/c; no diarrhea  Suspect this is related to new start on PD  Antiemetics  Appreciate nephrology         Type 1 diabetes mellitus with diabetic nephropathy, with long-term current use of insulin ([address] )   Assessment & Plan    Lab Results   Component Value Date    HGBA1C 4 9 09/08/2018       No results for input(s): POCGLU in the last 72 hours  Blood Sugar Average: Last 72 hrs:     Reports poor control at home with levels in 300s   Home regimen lantus 11units qam and 10units qpm   Lispro 5 units with meals  + ISS   Consider endocrine consult if difficult to control         Renovascular hypertension   Assessment & Plan    Labetalol 150mg qam, afternoon; 300mg in the evening  Hydralazine 100mg TID  Nifedipine 60mg BID  Hold torsemide             VTE Prophylaxis: low risk;   / sequential compression device   Code Status: FULL   POLST: POLST form is not discussed and not completed at this time  Anticipated Length of Stay:  Patient will be admitted on an Observation basis with an anticipated length of stay of  < 2 midnights  Justification for Hospital Stay: dehydration, LLUVIA in pt newly started on PD     Total Time for Visit, including Counseling / Coordination of Care: 1 hour  Greater than 50% of this total time spent on direct patient counseling and coordination of care      Chief Complaint: Nausea and vomiting    History of Present Illness:    Bradley Lyle is a 26 y o  male history of type 1 diabetes on insulin, end-stage renal disease newly started on PD who presents with nausea, vomiting and poor appetite since Friday  Patient states he has been unable to tolerate any oral intake since Friday due to nausea  He has a new start on PD has been doing his exchanges manually  No cloudy fluid observed  No pain in abdomen or around catheter site  He states he feels full despite emptying and as result has no appetite  Also having episodes of nausea with vomiting  Has loose stool but not frequent  No fevers or chills  No other sick contacts and no sick family members in the house  No lower extremity swelling or shortness of breath  Has noted more labile blood sugars in the past few days as well was recently seen by Endocrinology who decreased his nighttime basal dose  He has been compliant with all his medications  Review of Systems:    Review of Systems   Constitutional: Positive for appetite change  Negative for chills and fever  Respiratory: Negative for cough and shortness of breath  Cardiovascular: Negative for chest pain and leg swelling  Gastrointestinal: Positive for abdominal distention, nausea and vomiting  Negative for abdominal pain  Genitourinary: Positive for decreased urine volume  Neurological: Positive for dizziness and weakness         Past Medical and Surgical History:     Past Medical History:   Diagnosis Date    Acute kidney injury ([facility] [address] )     Cerebellar stroke side undetermined 2015 2015,1/2018    Diabetes type 1, controlled ([facility] [address] )     IDDM    History of shingles 2010    History of transfusion 02/2018    Hypertension     Muscle weakness     general unsteadiness       Past Surgical History:   Procedure Laterality Date    CARDIAC LOOP RECORDER  05/2018    PERITONEAL CATHETER INSERTION N/A 8/27/2018    Procedure: UNROOF PD CATHETER;  Surgeon: Guy Brandt DO [place];  [facility]: [facility];  Service: General    [place] LAP INSERTION TUNNELED INTRAPERITONEAL CATHETER N/A 8/6/2018    Procedure: LAPAROSCOPIC PD CATHETER PLACEMENT;  Surgeon: Gulshan Fam DO;  Location: [facility];  Service: General    TONSILLECTOMY         Meds/Allergies:    Prior to Admission medications    Medication Sig Start Date End Date Taking?  Authorizing Provider   calcium acetate (PHOSLO) 667 mg capsule Take 1,334 mg by mouth 3 (three) times a day with meals   Yes Historical Provider, MD   Cholecalciferol (VITAMIN D) 2000 units CAPS Take 1 capsule by mouth daily   Yes Historical Provider, MD   clopidogrel (PLAVIX) 75 mg tablet Take 1 tablet (75 mg total) by mouth daily 8/9/18  Yes Genaro Morales DO   ergocalciferol (ERGOCALCIFEROL) 95215 units capsule Take 1 capsule (50,000 Units total) by mouth every 30 (thirty) days for 12 days 6/14/18 9/16/18 Yes DEISI Mccain   escitalopram (LEXAPRO) 20 mg tablet TAKE 1/2 (ONE-HALF) TABLET BY MOUTH ONCE DAILY 9/10/18  Yes Genaro Morales DO   folic acid (FOLVITE) 1 mg tablet Take 1 tablet (1 mg total) by mouth daily 8/9/18  Yes Genaro Morales DO   hydrALAZINE (APRESOLINE) 100 MG tablet Take 1 tablet (100 mg total) by mouth 3 (three) times a day  Patient taking differently: Take 50 mg by mouth 3 (three) times a day   3/12/18  Yes DEISI Mccain   insulin glargine (BASAGLAR KWIKPEN) 100 units/mL injection pen Inject 10u sc in AM and 12u in PM  Patient taking differently: Inject 10 Units under the skin 2 (two) times a day Inject 10u sc in AM and 12u in PM  8/16/18  Yes Genaro Morales DO   insulin lispro (ADMELOG SOLOSTAR) 100 units/mL injection pen Inject 5 Units under the skin 3 (three) times a day with meals 9/11/18  Yes DEISI Espino   labetalol (NORMODYNE) 300 mg tablet Take 0 5 tablets (150 mg total) by mouth every 8 (eight) hours  Patient taking differently: Take 150 mg by mouth daily   4/12/18  Yes DEISI Mccain   NIFEdipine ER (ADALAT CC) 60 MG 24 hr tablet Take 1 tablet (60 mg total) by mouth 2 (two) times a day 8/24/18  Yes Karen Mcdanile MD   sodium bicarbonate 650 mg tablet Take 1 tablet (650 mg total) by mouth 4 (four) times a day 4/4/18  Yes Karen Mcdaniel MD   torsemide (DEMADEX) 20 mg tablet Take 1 tablet (20 mg total) by mouth 2 (two) times a day Take an extra dose in the afternoon for three days then daily  Patient taking differently: Take 20 mg by mouth 2 (two) times a day   7/18/18  Yes Joshua Beasley MD   acetaminophen (TYLENOL) 325 mg tablet Take 650 mg tylenol as needed every 6-8 hours for pain  Patient not taking: Reported on 9/11/2018 8/27/18   Jeraldene Soulier, MD   atorvastatin (LIPITOR) 40 mg tablet Take 1 tablet (40 mg total) by mouth every evening 8/9/18   [facility], DO   B-D ULTRAFINE III SHORT PEN 31G X 8 MM MISC Inject under the skin 4 (four) times a day 8/9/18   [facility] Sick, DO   escitalopram (LEXAPRO) 10 mg tablet Take 1 tablet (10 mg total) by mouth daily 8/9/18   Marlengana Sick,    ondansetron (ZOFRAN) 4 mg tablet Take 1 tablet (4 mg total) by mouth every 8 (eight) hours as needed for nausea or vomiting  Patient not taking: Reported on 9/11/2018 8/16/18   Karen Mcdaniel MD   oxyCODONE-acetaminophen (PERCOCET) 5-325 mg per tablet Take 2 tablets by mouth every 4 (four) hours as needed for moderate pain for up to 20 doses Max Daily Amount: 12 tablets  Patient not taking: Reported on 9/11/2018 8/6/18   Felipe Lindo DO   cloNIDine (CATAPRES) 0 1 mg tablet Take 2 tablets (0 2 mg total) by mouth daily at bedtime  Patient not taking: Reported on 9/11/2018 7/25/18 9/16/18  DEISI Jimenez     I have reviewed home medications with patient personally  Allergies:    Allergies   Allergen Reactions    Sulfa Antibiotics Rash     Social History:  Marital Status: Single   Patient Pre-hospital Living Situation:   Patient Pre-hospital Level of Mobility: independent   Patient Pre-hospital Diet Restrictions: diabetic   Substance Use History: History   Alcohol Use    Yes     Comment: rarely     History   Smoking Status    Former Smoker    Packs/day: 0 50    Years: 12 00    Types: Cigarettes   Smokeless Tobacco    Never Used     Comment: quit 2/2018     History   Drug Use No     Comment: weekly-none since 11/2017       Family History:    Family History   Problem Relation Age of Onset   Eitan Romero Breast cancer Mother     Hypertension Mother     Hyperlipidemia Father     Hypertension Father     Leukemia Maternal Grandmother     Hyperlipidemia Maternal Grandfather     Hypertension Maternal Grandfather     Hyperlipidemia Paternal Grandmother     Hypertension Paternal Grandmother     Heart disease Paternal Grandfather         cardiac disorder    Diabetes Paternal Grandfather        Physical Exam:     Vitals:   Blood Pressure: 149/82 (09/16/18 1425)  Pulse: 94 (09/16/18 1425)  Temperature: 98 6 °F (37 °C) (09/16/18 1001)  Temp Source: Oral (09/16/18 1001)  Respirations: 14 (09/16/18 1425)  Weight - Scale: 88 kg (194 lb) (09/16/18 0956)  SpO2: 97 % (09/16/18 1425)    Physical Exam   Constitutional: He is oriented to person, place, and time  No distress  Non toxic appearing    HENT:   Head: Normocephalic and atraumatic  Cardiovascular: Normal rate and regular rhythm  Exam reveals no friction rub  No murmur heard  Pulmonary/Chest: Effort normal and breath sounds normal  No respiratory distress  He has no wheezes  Abdominal: Soft  Bowel sounds are normal  He exhibits no distension  There is no tenderness  There is no rebound  PD site is clean; non tender  Musculoskeletal: He exhibits no edema  Neurological: He is alert and oriented to person, place, and time  Skin: He is not diaphoretic  Nursing note and vitals reviewed  Additional Data:     Lab Results: I have personally reviewed pertinent reports          Results from last 7 days  Lab Units 09/16/18  1058   WBC Thousand/uL 8 68   HEMOGLOBIN g/dL 10 7*   HEMATOCRIT % 31 2* PLATELETS Thousands/uL 384   NEUTROS PCT % 68   LYMPHS PCT % 20   MONOS PCT % 8   EOS PCT % 3       Results from last 7 days  Lab Units 09/16/18  1058   SODIUM mmol/L 138   POTASSIUM mmol/L 3 4*   CHLORIDE mmol/L 96*   CO2 mmol/L 32   BUN mg/dL 48*   CREATININE mg/dL 11 74*   CALCIUM mg/dL 9 1   ALK PHOS U/L 79   ALT U/L 21   AST U/L 17       Results from last 7 days  Lab Units 09/16/18  1058   INR  1 05       Imaging: I have personally reviewed pertinent reports  Ct Abdomen Pelvis Wo Contrast    Result Date: 9/16/2018  Narrative: CT ABDOMEN AND PELVIS WITHOUT IV CONTRAST INDICATION:   Nausea, vomiting, suprapubic tenderness, recent placement and initiation of use of peritoneal dialysis catheter  COMPARISON:  Renal ultrasound performed January 23, 2018  TECHNIQUE:  CT examination of the abdomen and pelvis was performed without intravenous contrast   Axial, sagittal, and coronal 2D reformatted images were created from the source data and submitted for interpretation  Radiation dose length product (DLP) for this visit:  417 mGy-cm   This examination, like all CT scans performed in the [facility], was performed utilizing techniques to minimize radiation dose exposure, including the use of iterative reconstruction and automated exposure control  Enteric contrast was not administered  FINDINGS: ABDOMEN LOWER CHEST:  No clinically significant abnormality identified in the visualized lower chest  LIVER/BILIARY TREE:  Unremarkable  GALLBLADDER:  No calcified gallstones  No pericholecystic inflammatory change  SPLEEN:  Unremarkable  PANCREAS:  Unremarkable  ADRENAL GLANDS:  Unremarkable  KIDNEYS/URETERS:  Unremarkable  No hydronephrosis  STOMACH AND BOWEL:  Unremarkable  APPENDIX:  No findings to suggest appendicitis  ABDOMINOPELVIC CAVITY:  Moderate volume of abdominopelvic fluid consistent with peritoneal dialysis    Expected appearance of peritoneal dialysis catheter, coiling within the right hemipelvis  No free intraperitoneal gas  VESSELS:  Unremarkable for patient's age  PELVIS REPRODUCTIVE ORGANS:  Unremarkable for patient's age  URINARY BLADDER:  Unremarkable  ABDOMINAL WALL/INGUINAL REGIONS:  Changes of recent suprapubic catheter placement  Otherwise unremarkable body wall  OSSEOUS STRUCTURES:  No acute fracture or destructive osseous lesion  Impression: No noncontrast CT abnormality to account for the patient's symptoms  Abdominopelvic fluid consistent with peritoneal dialysis  Expected appearance of peritoneal dialysis catheter  Workstation performed: [id number]     Allscripts / Epic Records Reviewed: Yes     ** Please Note: This note has been constructed using a voice recognition system   **

## 2018-09-16 NOTE — ED PROVIDER NOTES
History  Chief Complaint   Patient presents with    Vomiting     Pt  c/o vomiting for three days with nausea  Pt  Type 1 diabetic last reading 189 @0900  Pt  took ondansetron @0700  The patient is a 75-year-old male who presents to the emergency department relating that he has been [address] up since Friday    He relates that his glucoses have been high  They have been averaging in the upper 300s over that time  He has occasionally had episodes where his glucose was 2 low as well  Over this time he has felt very tired  Last week he did have an appointment with his endocrinologist   Due to having had low blood glucoses on a number of nights his Lantus dose was decreased  He is not sure if this accounts for the elevated glucose is alone or whether not he has developed DKA as well  He has not been having any fevers and he has been checking regularly  He did start peritoneal dialysis approximately 2 weeks ago  He is doing this manually q i d  He has not yet started with a cycler  As a result of its use he feels full all the time and his appetite has been significantly reduced  He notes over the past 3 to 4 days he has minimally urinated  Prior to that he had voided regularly  He denies any bowel abnormalities  No known sick contacts  No chest pain or shortness of breath  No nasal congestion or sore throat  He did have the ondansetron tablets to swallow  These have not adequately alleviated his nausea/vomiting  Past medical history reviewed  Patient's nephrologist is Dr Person             Prior to Admission Medications   Prescriptions Last Dose Informant Patient Reported? Taking?    B-D ULTRAFINE III SHORT PEN 31G X 8 MM MISC   No No   Sig: Inject under the skin 4 (four) times a day   Cholecalciferol (VITAMIN D) 2000 units CAPS  Mother Yes Yes   Sig: Take 1 capsule by mouth daily   NIFEdipine ER (ADALAT CC) 60 MG 24 hr tablet   No Yes   Sig: Take 1 tablet (60 mg total) by mouth 2 (two) times a day acetaminophen (TYLENOL) 325 mg tablet Unknown at Unknown time  No No   Sig: Take 650 mg tylenol as needed every 6-8 hours for pain     Patient not taking: Reported on 9/11/2018    atorvastatin (LIPITOR) 40 mg tablet 9/15/2018 at Unknown time  No Yes   Sig: Take 1 tablet (40 mg total) by mouth every evening   calcium acetate (PHOSLO) 667 mg capsule   Yes Yes   Sig: Take 1,334 mg by mouth 3 (three) times a day with meals   clopidogrel (PLAVIX) 75 mg tablet   No Yes   Sig: Take 1 tablet (75 mg total) by mouth daily   ergocalciferol (ERGOCALCIFEROL) 42498 units capsule   No Yes   Sig: Take 1 capsule (50,000 Units total) by mouth every 30 (thirty) days for 12 days   escitalopram (LEXAPRO) 10 mg tablet   No No   Sig: Take 1 tablet (10 mg total) by mouth daily   folic acid (FOLVITE) 1 mg tablet   No Yes   Sig: Take 1 tablet (1 mg total) by mouth daily   hydrALAZINE (APRESOLINE) 100 MG tablet  Mother No Yes   Sig: Take 1 tablet (100 mg total) by mouth 3 (three) times a day   Patient taking differently: Take 50 mg by mouth 3 (three) times a day     insulin glargine (BASAGLAR KWIKPEN) 100 units/mL injection pen   No Yes   Sig: Inject 10u sc in AM and 12u in PM   Patient taking differently: Inject 10 Units under the skin 2 (two) times a day Inject 10u sc in AM and 12u in PM    insulin lispro (ADMELOG SOLOSTAR) 100 units/mL injection pen   No Yes   Sig: Inject 5 Units under the skin 3 (three) times a day with meals   labetalol (NORMODYNE) 300 mg tablet  Mother No Yes   Sig: Take 0 5 tablets (150 mg total) by mouth every 8 (eight) hours   Patient taking differently: Take 150 mg by mouth daily     ondansetron (ZOFRAN) 4 mg tablet   No No   Sig: Take 1 tablet (4 mg total) by mouth every 8 (eight) hours as needed for nausea or vomiting   Patient not taking: Reported on 9/11/2018    oxyCODONE-acetaminophen (PERCOCET) 5-325 mg per tablet   No No   Sig: Take 2 tablets by mouth every 4 (four) hours as needed for moderate pain for up to 20 doses Max Daily Amount: 12 tablets   Patient not taking: Reported on 9/11/2018    sodium bicarbonate 650 mg tablet  Mother No Yes   Sig: Take 1 tablet (650 mg total) by mouth 4 (four) times a day   torsemide (DEMADEX) 20 mg tablet  Self No Yes   Sig: Take 1 tablet (20 mg total) by mouth 2 (two) times a day Take an extra dose in the afternoon for three days then daily   Patient taking differently: Take 20 mg by mouth 2 (two) times a day        Facility-Administered Medications: None       Past Medical History:   Diagnosis Date    [facility] kidney injury ([facility] [address] )     Cerebellar stroke side undetermined 2015 2015,1/2018    Diabetes type 1, controlled ([facility] [address] )     IDDM    History of shingles 2010    History of transfusion 02/2018    Hypertension     Muscle weakness     general unsteadiness       Past Surgical History:   Procedure Laterality Date    CARDIAC LOOP RECORDER  05/2018    PERITONEAL CATHETER INSERTION N/A 8/27/2018    Procedure: UNROOF PD CATHETER;  Surgeon: Sunita Hawley DO;  [facility];  Service: General    PA LAP INSERTION TUNNELED INTRAPERITONEAL CATHETER N/A 8/6/2018    Procedure: LAPAROSCOPIC PD CATHETER PLACEMENT;  Surgeon: Sunita Hawley DO;  [facility];  Service: General    TONSILLECTOMY         Family History   Problem Relation Age of Onset    Breast cancer Mother     Hypertension Mother     Hyperlipidemia Father     Hypertension Father     Leukemia Maternal Grandmother     Hyperlipidemia Maternal Grandfather     Hypertension Maternal Grandfather     Hyperlipidemia Paternal Grandmother     Hypertension Paternal Grandmother     Heart disease Paternal Grandfather         cardiac disorder    Diabetes Paternal Grandfather      I have reviewed and agree with the history as documented      Social History   Substance Use Topics    Smoking status: Former Smoker     Packs/day: 0 50     Years: 12 00     Types: Cigarettes    Smokeless tobacco: Never Used      Comment: quit 2/2018    Alcohol use Yes      Comment: rarely        Review of Systems   All other systems reviewed and are negative  Physical Exam  Physical Exam   Constitutional: He is oriented to person, place, and time  He appears well-developed and well-nourished  Appears mildly malaised   HENT:   Head: Normocephalic  Eyes: Conjunctivae and EOM are normal    Cardiovascular: Normal rate and regular rhythm  Pulmonary/Chest: Effort normal and breath sounds normal    Abdominal: Soft  Bowel sounds are normal  He exhibits no distension  There is tenderness (Suprapubic-moderate)  There is no guarding  No CVA tenderness  Was peritoneal dialysis catheter is present in the left lower abdomen  No erythema or tenderness surrounding site  Musculoskeletal: Normal range of motion  He exhibits no edema or tenderness  Neurological: He is alert and oriented to person, place, and time  Skin: Skin is warm and dry  Psychiatric: He has a normal mood and affect  His behavior is normal    Nursing note and vitals reviewed        Vital Signs  ED Triage Vitals   Temperature Pulse Respirations Blood Pressure SpO2   09/16/18 1001 09/16/18 0956 09/16/18 0956 09/16/18 0956 09/16/18 0956   98 6 °F (37 °C) 93 20 130/66 100 %      Temp Source Heart Rate Source Patient Position - Orthostatic VS BP Location FiO2 (%)   09/16/18 1001 09/16/18 1221 09/16/18 1425 09/16/18 1425 --   Oral Monitor Lying Right arm       Pain Score       09/16/18 0956       No Pain           Vitals:    09/16/18 1221 09/16/18 1425 09/16/18 1515 09/16/18 2114   BP: 133/80 149/82 169/93 168/86   Pulse: 90 94 97 85   Patient Position - Orthostatic VS:  Lying Lying Lying       Visual Acuity      ED Medications  Medications   escitalopram (LEXAPRO) tablet 10 mg (not administered)   hydrALAZINE (APRESOLINE) tablet 50 mg (50 mg Oral Given 7/83/16 7722)   folic acid (FOLVITE) tablet 1 mg (not administered)   labetalol (NORMODYNE) tablet 150 mg (150 mg Oral Given 9/16/18 1659)   NIFEdipine (PROCARDIA XL) 24 hr tablet 60 mg (not administered)   sodium bicarbonate tablet 650 mg (650 mg Oral Given 9/16/18 2116)   calcium acetate (PHOSLO) capsule 667 mg (667 mg Oral Given 9/16/18 1659)   ondansetron (ZOFRAN) injection 4 mg (not administered)   insulin glargine (LANTUS) subcutaneous injection 10 Units 0 1 mL (10 Units Subcutaneous Given 9/16/18 2116)   insulin lispro (HumaLOG) 100 units/mL subcutaneous injection 5 Units (5 Units Subcutaneous Given 9/16/18 1652)   insulin lispro (HumaLOG) 100 units/mL subcutaneous injection 1-5 Units (1 Units Subcutaneous Given 9/16/18 1652)   multi-electrolyte (ISOLYTE-S PH 7 4 equivalent) IV solution (100 mL/hr Intravenous New Bag 9/16/18 1652)   insulin lispro (HumaLOG) 100 units/mL subcutaneous injection 1-6 Units (2 Units Subcutaneous Given 9/16/18 2116)   atorvastatin (LIPITOR) tablet 40 mg (40 mg Oral Given 9/16/18 1659)   clopidogrel (PLAVIX) tablet 75 mg (75 mg Oral Given 9/16/18 1659)   sodium chloride 0 9 % bolus 1,000 mL (0 mL Intravenous Stopped 9/16/18 1200)   ondansetron (ZOFRAN) injection 4 mg (4 mg Intravenous Given 9/16/18 1057)       Diagnostic Studies  Results Reviewed     Procedure Component Value Units Date/Time    Urine Microscopic [[id number]]  (Abnormal) Collected:  09/16/18 1426    Lab Status:  Final result Specimen:  Urine from Urine, Clean Catch Updated:  09/16/18 1440     RBC, UA None Seen /hpf      WBC, UA 0-5 /hpf      Epithelial Cells Occasional /hpf      Bacteria, UA Occasional /hpf      Hyaline Casts, UA 1-2 (A) /lpf     ED Urine Macroscopic [[id number]]  (Abnormal) Collected:  09/16/18 1426    Lab Status:  Final result Specimen:  Urine Updated:  09/16/18 1414     Color, UA Yellow     Clarity, UA Clear     pH, UA 6 5     Leukocytes, UA Negative     Nitrite, UA Negative     Protein, UA >=300 (A) mg/dl      Glucose,  (1/10%) (A) mg/dl      Ketones, UA Negative mg/dl      Urobilinogen, UA 0 2 E U /dl Bilirubin, UA Negative     Blood, UA Negative     Specific Gravity, UA 1 025    Narrative:       CLINITEK RESULT    Lactic acid, plasma [[id number]]  (Normal) Collected:  09/16/18 1058    Lab Status:  Final result Specimen:  Blood from Arm, Right Updated:  09/16/18 1133     LACTIC ACID 1 0 mmol/L     Narrative:         Result may be elevated if tourniquet was used during collection  Comprehensive metabolic panel [[id number]]  (Abnormal) Collected:  09/16/18 1058    Lab Status:  Final result Specimen:  Blood from Arm, Right Updated:  09/16/18 1130     Sodium 138 mmol/L      Potassium 3 4 (L) mmol/L      Chloride 96 (L) mmol/L      CO2 32 mmol/L      ANION GAP 10 mmol/L      BUN 48 (H) mg/dL      Creatinine 11 74 (H) mg/dL      Glucose 91 mg/dL      Calcium 9 1 mg/dL      AST 17 U/L      ALT 21 U/L      Alkaline Phosphatase 79 U/L      Total Protein 7 0 g/dL      Albumin 3 7 g/dL      Total Bilirubin 0 40 mg/dL      eGFR 5 ml/min/1 73sq m     Narrative:         National Kidney Disease Education Program recommendations are as follows:  GFR calculation is accurate only with a steady state creatinine  Chronic Kidney disease less than 60 ml/min/1 73 sq  meters  Kidney failure less than 15 ml/min/1 73 sq  meters      Magnesium [[id number]]  (Normal) Collected:  09/16/18 1058    Lab Status:  Final result Specimen:  Blood from Arm, Right Updated:  09/16/18 1130     Magnesium 2 4 mg/dL     Lipase [[id number]]  (Normal) Collected:  09/16/18 1058    Lab Status:  Final result Specimen:  Blood from Arm, Right Updated:  09/16/18 1130     Lipase 110 u/L     Protime-INR [[id number]]  (Normal) Collected:  09/16/18 1058    Lab Status:  Final result Specimen:  Blood from Arm, Right Updated:  09/16/18 1121     Protime 13 4 seconds      INR 1 05    CBC and differential [[id number]]  (Abnormal) Collected:  09/16/18 1058    Lab Status:  Final result Specimen:  Blood from Arm, Right Updated:  09/16/18 1104     WBC 8 68 Thousand/uL      RBC 3 47 (L) Million/uL      Hemoglobin 10 7 (L) g/dL      Hematocrit 31 2 (L) %      MCV 90 fL      MCH 30 8 pg      MCHC 34 3 g/dL      RDW 14 2 %      MPV 10 2 fL      Platelets 702 Thousands/uL      nRBC 0 /100 WBCs      Neutrophils Relative 68 %      Immat GRANS % 0 %      Lymphocytes Relative 20 %      Monocytes Relative 8 %      Eosinophils Relative 3 %      Basophils Relative 1 %      Neutrophils Absolute 5 94 Thousands/µL      Immature Grans Absolute 0 01 Thousand/uL      Lymphocytes Absolute 1 72 Thousands/µL      Monocytes Absolute 0 65 Thousand/µL      Eosinophils Absolute 0 29 Thousand/µL      Basophils Absolute 0 07 Thousands/µL                  CT abdomen pelvis wo contrast   Final Result by María Elena Jones MD (09/16 1243)      No noncontrast CT abnormality to account for the patient's symptoms  Abdominopelvic fluid consistent with peritoneal dialysis  Expected appearance of peritoneal dialysis catheter  Workstation performed: [id number]                    Procedures  Procedures       Phone Contacts  ED Phone Contact    ED Course  ED Course as of Sep 16 2224   Barry Chanel Sep 16, 2018   1055 Medications ordered for symptomatic treatment  Labs are pending  Discussed desire to obtain CT scan given suprapubic tenderness and recent instrumentation/placement of the peritoneal dialysis catheter  Family expressed concern over contrast as patient had worsening of renal function following this in January  Have paged Nephrology (patient's nephrologist Dr Person) to discuss  1135 Patient not in DKA  He is over at CT at this time  1204 Patient feeling improved  Nausea controlled  I have been in contact with the patient's nephrologist-Dr Person    Decision was made to obtain CT without contrast at this time  If contrast is absolutely needed patient can be hydrated pre and post study  Patient's creatinine is elevated today at 11  Recent creatinine had been in the 6 to 7 range    This is likely on the basis of volume depletion  Patient will be admitted overnight for gentle hydration as reviewed with Dr Person    Peritoneal dialysis will be held at this time  Patient and family aware  CT results remain pending  Will contact admitting team following their return  MDM  Number of Diagnoses or Management Options  Dehydration:   Nausea & vomiting:   Diagnosis management comments: Pt  Continued w/ improved feeling during ED stay  2pm medications taken from home supply  Plan will be for drainage of PD fluid currently in place  All of pt's & family's Qs were answered  The patient presented with a condition in which there was a high probability of imminent or life-threatening deterioration, and critical care services (excluding separately billable procedures) totalled 30-74 minutes (30)  Disposition  Final diagnoses:   Dehydration   Nausea & vomiting     Time reflects when diagnosis was documented in both MDM as applicable and the Disposition within this note     Time User Action Codes Description Comment    9/16/2018  2:08 PM Ney Reese A Add [E86 0] Dehydration     9/16/2018  2:08 PM Eddi Jones A Add [R11 2] Nausea & vomiting     9/16/2018  3:00 PM Unk Leandro [I15 0] Renovascular hypertension     9/16/2018  3:00 PM Philipp Fend [I15 0] Renovascular hypertension     9/16/2018  3:00 PM Dillon Mendes Add [Z99 2] Peritoneal dialysis catheter in place ([facility] [address] )     9/16/2018  3:00 PM Rivereno Fend [Z99 2] Peritoneal dialysis catheter in place [facility])       ED Disposition     ED Disposition Condition Comment    Admit  Case was discussed with Dr Vini Jones and the patient's admission status was agreed to be Admission Status: observation status to the service of Dr Vini Jones           Follow-up Information    None         Current Discharge Medication List      CONTINUE these medications which have NOT CHANGED    Details   atorvastatin (LIPITOR) 40 mg tablet Take 1 tablet (40 mg total) by mouth every evening  Qty: 90 tablet, Refills: 1    Associated Diagnoses: Blurred vision; Cerebrovascular accident (CVA), unspecified mechanism ([facility] ); Optic neuritis due to multiple sclerosis ([facility] ); Hypertensive urgency; Acute renal failure with acute tubular necrosis superimposed on stage 2 chronic kidney disease ([facility] ); Acute renal failure with acute tubular necrosis superimposed on stage 3 chronic kidney disease ([facility] ); Azotemia; Hyperphosphatemia; Persistent proteinuria; Vitamin D deficiency; Cerebrovascular accident (CVA) of left pontine structure ([facility] ); Type 1 diabetes mellitus with diabetic nephropathy, with long-term current use of insulin ([facility] ); History of lacunar cerebrovascular accident (CVA); Nausea; 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; Hyperhomocysteinemia ([facility] ); Binocular visual disturbance      calcium acetate (PHOSLO) 667 mg capsule Take 1,334 mg by mouth 3 (three) times a day with meals      Cholecalciferol (VITAMIN D) 2000 units CAPS Take 1 capsule by mouth daily      clopidogrel (PLAVIX) 75 mg tablet Take 1 tablet (75 mg total) by mouth daily  Qty: 90 tablet, Refills: 1    Associated Diagnoses: Blurred vision; Cerebrovascular accident (CVA), unspecified mechanism ([facility] ); Optic neuritis due to multiple sclerosis ([facility] ); Hypertensive urgency; Acute renal failure with acute tubular necrosis superimposed on stage 2 chronic kidney disease ([facility] ); Acute renal failure with acute tubular necrosis superimposed on stage 3 chronic kidney disease ([facility] ); Azotemia; Hyperphosphatemia; Persistent proteinuria; Vitamin D deficiency; Cerebrovascular accident (CVA) of left pontine structure ([facility] ); Type 1 diabetes mellitus with diabetic nephropathy, with long-term current use of insulin ([facility] ); History of lacunar cerebrovascular accident (CVA); Nausea; 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; Hyperhomocysteinemia ([facility] );  Binocular visual disturbance ergocalciferol (ERGOCALCIFEROL) 25251 units capsule Take 1 capsule (50,000 Units total) by mouth every 30 (thirty) days for 12 days  Qty: 12 capsule, Refills: 0    Associated Diagnoses: Vitamin D deficiency      folic acid (FOLVITE) 1 mg tablet Take 1 tablet (1 mg total) by mouth daily  Qty: 90 tablet, Refills: 1    Associated Diagnoses: Blurred vision; Cerebrovascular accident (CVA), unspecified mechanism ([facility] [address] ); Optic neuritis due to multiple sclerosis ([facility] [address] ); Hypertensive urgency; Acute renal failure with acute tubular necrosis superimposed on stage 2 chronic kidney disease ([facility] [address] ); Acute renal failure with acute tubular necrosis superimposed on stage 3 chronic kidney disease ([address] ); Azotemia; Hyperphosphatemia; Persistent proteinuria; Vitamin D deficiency; Cerebrovascular accident (CVA) of left pontine structure ([facility] [address] ); Type 1 diabetes mellitus with diabetic nephropathy, with long-term current use of insulin ([facility] [address] ); History of lacunar cerebrovascular accident (CVA); Nausea; 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; Hyperhomocysteinemia ([address] ); Binocular visual disturbance      hydrALAZINE (APRESOLINE) 100 MG tablet Take 1 tablet (100 mg total) by mouth 3 (three) times a day  Qty: 270 tablet, Refills: 3    Associated Diagnoses: Blurred vision; Essential hypertension; Cerebrovascular accident (CVA), unspecified mechanism ([address] ); Optic neuritis due to multiple sclerosis ([facility] [address] ); Hypertensive urgency; Acute renal failure with acute tubular necrosis superimposed on stage 2 chronic kidney disease ([facility] [address] ); Acute renal failure with acute tubular necrosis superimposed on stage 3 chronic kidney disease ([address] ); Azotemia; Hyperphosphatemia; Persistent proteinuria; Vitamin D deficiency; Cerebrovascular accident (CVA) of left pontine structure ([address] ); Type 1 diabetes mellitus with diabetic nephropathy, with long-term current use of insulin ([facility] [address] ); History of lacunar cerebrovascular accident (CVA); Nausea;  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; Hyperhomocysteinemia ([facility] [address] ); Binocular visual disturbance; Acute renal failure superimposed on stage 3 chronic kidney disease, unspecified acute renal failure type ([facility])      insulin glargine (BASAGLAR KWIKPEN) 100 units/mL injection pen Inject 10u sc in AM and 12u in PM  Qty: 5 pen, Refills: 5    Comments: Dx E10 21  Associated Diagnoses: Type 1 diabetes mellitus with diabetic nephropathy, with long-term current use of insulin ([facility])      insulin lispro (ADMELOG SOLOSTAR) 100 units/mL injection pen Inject 5 Units under the skin 3 (three) times a day with meals  Qty: 5 pen, Refills: 3    Associated Diagnoses: Type 1 diabetes mellitus with diabetic nephropathy, with long-term current use of insulin ([facility])      labetalol (NORMODYNE) 300 mg tablet Take 0 5 tablets (150 mg total) by mouth every 8 (eight) hours  Qty: 60 tablet, Refills: 3    Associated Diagnoses: Accelerated hypertension      NIFEdipine ER (ADALAT CC) 60 MG 24 hr tablet Take 1 tablet (60 mg total) by mouth 2 (two) times a day  Qty: 180 tablet, Refills: 5    Associated Diagnoses: Accelerated hypertension      sodium bicarbonate 650 mg tablet Take 1 tablet (650 mg total) by mouth 4 (four) times a day  Qty: 120 tablet, Refills: 6    Associated Diagnoses: Hyperkalemia      torsemide (DEMADEX) 20 mg tablet Take 1 tablet (20 mg total) by mouth 2 (two) times a day Take an extra dose in the afternoon for three days then daily  Qty: 90 tablet, Refills: 0    Associated Diagnoses: Acute renal failure superimposed on stage 3 chronic kidney disease, unspecified acute renal failure type ([facility])      acetaminophen (TYLENOL) 325 mg tablet Take 650 mg tylenol as needed every 6-8 hours for pain    Qty: 30 tablet, Refills: 0    Associated Diagnoses: Peritoneal dialysis catheter in place ([facility] [address] )      B-D ULTRAFINE III SHORT PEN 31G X 8 MM MISC Inject under the skin 4 (four) times a day  Qty: 400 each, Refills: 3    Comments: Dx A04 [id number]  Associated Diagnoses: Type 1 diabetes mellitus with diabetic nephropathy, with long-term current use of insulin ([facility])      escitalopram (LEXAPRO) 10 mg tablet Take 1 tablet (10 mg total) by mouth daily  Qty: 90 tablet, Refills: 1    Associated Diagnoses: Episode of recurrent major depressive disorder, unspecified depression episode severity ([facility])      ondansetron (ZOFRAN) 4 mg tablet Take 1 tablet (4 mg total) by mouth every 8 (eight) hours as needed for nausea or vomiting  Qty: 30 tablet, Refills: 3    Associated Diagnoses: Chronic kidney disease, stage 4 (severe) ([facility])      oxyCODONE-acetaminophen (PERCOCET) 5-325 mg per tablet Take 2 tablets by mouth every 4 (four) hours as needed for moderate pain for up to 20 doses Max Daily Amount: 12 tablets  Qty: 28 tablet, Refills: 0    Associated Diagnoses: Chronic kidney disease, stage 4 (severe) ([facility])           No discharge procedures on file      ED Provider  Electronically Signed by           Srinivasan La MD  09/16/18 5277

## 2018-09-16 NOTE — ASSESSMENT & PLAN NOTE
Recently started on manual PD QID  C/o increased fullness  Poor oral intake   Decreased UOP  Suspected prerenal injury   Hold further PD  Consult nephrology  IVF; trend BMP

## 2018-09-16 NOTE — ASSESSMENT & PLAN NOTE
Lab Results   Component Value Date    HGBA1C 4 9 09/08/2018       No results for input(s): POCGLU in the last 72 hours      Blood Sugar Average: Last 72 hrs:     Reports poor control at home with levels in 300s   Home regimen lantus 11units qam and 10units qpm   Lispro 5 units with meals  + ISS   Consider endocrine consult if difficult to control

## 2018-09-16 NOTE — ASSESSMENT & PLAN NOTE
Since last Friday  No f/c; no diarrhea  Suspect this is related to new start on PD  Antiemetics  Appreciate nephrology

## 2018-09-16 NOTE — PLAN OF CARE
Problem: PAIN - ADULT  Goal: Verbalizes/displays adequate comfort level or baseline comfort level  Interventions:  - Encourage patient to monitor pain and request assistance  - Assess pain using appropriate pain scale  - Administer analgesics based on type and severity of pain and evaluate response  - Implement non-pharmacological measures as appropriate and evaluate response  - Consider cultural and social influences on pain and pain management  - Notify physician/advanced practitioner if interventions unsuccessful or patient reports new pain  Outcome: Progressing      Problem: INFECTION - ADULT  Goal: Absence or prevention of progression during hospitalization  INTERVENTIONS:  - Assess and monitor for signs and symptoms of infection  - Monitor lab/diagnostic results  - Monitor all insertion sites, i e  indwelling lines, tubes, and drains  - Monitor endotracheal (as able) and nasal secretions for changes in amount and color  - [place] appropriate cooling/warming therapies per order  - Administer medications as ordered  - Instruct and encourage patient and family to use good hand hygiene technique  - Identify and instruct in appropriate isolation precautions for identified infection/condition  Outcome: Progressing    Goal: Absence of fever/infection during neutropenic period  INTERVENTIONS:  - Monitor WBC  - Implement neutropenic guidelines  Outcome: Progressing

## 2018-09-17 VITALS
HEART RATE: 76 BPM | BODY MASS INDEX: 28.52 KG/M2 | HEIGHT: 71 IN | SYSTOLIC BLOOD PRESSURE: 158 MMHG | DIASTOLIC BLOOD PRESSURE: 88 MMHG | OXYGEN SATURATION: 99 % | WEIGHT: 203.71 LBS | RESPIRATION RATE: 18 BRPM | TEMPERATURE: 99 F

## 2018-09-17 PROBLEM — D64.9 ANEMIA: Status: ACTIVE | Noted: 2018-05-01

## 2018-09-17 PROBLEM — R11.2 NAUSEA AND VOMITING: Status: RESOLVED | Noted: 2018-01-26 | Resolved: 2018-09-17

## 2018-09-17 LAB
ANION GAP SERPL CALCULATED.3IONS-SCNC: 16 MMOL/L (ref 4–13)
BASOPHILS # BLD AUTO: 0.07 THOUSANDS/ΜL (ref 0–0.1)
BASOPHILS NFR BLD AUTO: 1 % (ref 0–1)
BUN SERPL-MCNC: 48 MG/DL (ref 5–25)
CALCIUM SERPL-MCNC: 9 MG/DL (ref 8.3–10.1)
CHLORIDE SERPL-SCNC: 98 MMOL/L (ref 100–108)
CO2 SERPL-SCNC: 28 MMOL/L (ref 21–32)
CREAT SERPL-MCNC: 11.53 MG/DL (ref 0.6–1.3)
EOSINOPHIL # BLD AUTO: 0.28 THOUSAND/ΜL (ref 0–0.61)
EOSINOPHIL NFR BLD AUTO: 4 % (ref 0–6)
ERYTHROCYTE [DISTWIDTH] IN BLOOD BY AUTOMATED COUNT: 14 % (ref 11.6–15.1)
GFR SERPL CREATININE-BSD FRML MDRD: 5 ML/MIN/1.73SQ M
GLUCOSE P FAST SERPL-MCNC: 117 MG/DL (ref 65–99)
GLUCOSE SERPL-MCNC: 117 MG/DL (ref 65–140)
GLUCOSE SERPL-MCNC: 157 MG/DL (ref 65–140)
GLUCOSE SERPL-MCNC: 288 MG/DL (ref 65–140)
HCT VFR BLD AUTO: 26.5 % (ref 36.5–49.3)
HGB BLD-MCNC: 8.8 G/DL (ref 12–17)
IMM GRANULOCYTES # BLD AUTO: 0.02 THOUSAND/UL (ref 0–0.2)
IMM GRANULOCYTES NFR BLD AUTO: 0 % (ref 0–2)
LYMPHOCYTES # BLD AUTO: 2.09 THOUSANDS/ΜL (ref 0.6–4.47)
LYMPHOCYTES NFR BLD AUTO: 28 % (ref 14–44)
MCH RBC QN AUTO: 30.1 PG (ref 26.8–34.3)
MCHC RBC AUTO-ENTMCNC: 33.2 G/DL (ref 31.4–37.4)
MCV RBC AUTO: 91 FL (ref 82–98)
MONOCYTES # BLD AUTO: 0.62 THOUSAND/ΜL (ref 0.17–1.22)
MONOCYTES NFR BLD AUTO: 8 % (ref 4–12)
NEUTROPHILS # BLD AUTO: 4.39 THOUSANDS/ΜL (ref 1.85–7.62)
NEUTS SEG NFR BLD AUTO: 59 % (ref 43–75)
NRBC BLD AUTO-RTO: 0 /100 WBCS
PLATELET # BLD AUTO: 305 THOUSANDS/UL (ref 149–390)
PMV BLD AUTO: 10.1 FL (ref 8.9–12.7)
POTASSIUM SERPL-SCNC: 3.5 MMOL/L (ref 3.5–5.3)
RBC # BLD AUTO: 2.92 MILLION/UL (ref 3.88–5.62)
SODIUM SERPL-SCNC: 142 MMOL/L (ref 136–145)
WBC # BLD AUTO: 7.47 THOUSAND/UL (ref 4.31–10.16)

## 2018-09-17 PROCEDURE — 99254 IP/OBS CNSLTJ NEW/EST MOD 60: CPT | Performed by: INTERNAL MEDICINE

## 2018-09-17 PROCEDURE — 99239 HOSP IP/OBS DSCHRG MGMT >30: CPT | Performed by: PHYSICIAN ASSISTANT

## 2018-09-17 PROCEDURE — 82948 REAGENT STRIP/BLOOD GLUCOSE: CPT

## 2018-09-17 PROCEDURE — 80048 BASIC METABOLIC PNL TOTAL CA: CPT | Performed by: HOSPITALIST

## 2018-09-17 PROCEDURE — 85025 COMPLETE CBC W/AUTO DIFF WBC: CPT | Performed by: HOSPITALIST

## 2018-09-17 RX ORDER — HYDRALAZINE HYDROCHLORIDE 100 MG/1
50 TABLET, FILM COATED ORAL 3 TIMES DAILY
Status: ON HOLD
Start: 2018-09-17 | End: 2020-08-06 | Stop reason: SDUPTHER

## 2018-09-17 RX ORDER — NIFEDIPINE 60 MG/1
60 TABLET, FILM COATED, EXTENDED RELEASE ORAL DAILY
Qty: 180 TABLET | Refills: 0 | Status: ON HOLD
Start: 2018-09-17 | End: 2020-03-03 | Stop reason: SDUPTHER

## 2018-09-17 RX ADMIN — INSULIN LISPRO 3 UNITS: 100 INJECTION, SOLUTION INTRAVENOUS; SUBCUTANEOUS at 11:30

## 2018-09-17 RX ADMIN — HYDRALAZINE HYDROCHLORIDE 50 MG: 25 TABLET ORAL at 13:36

## 2018-09-17 RX ADMIN — FOLIC ACID 1 MG: 1 TABLET ORAL at 13:36

## 2018-09-17 RX ADMIN — INSULIN LISPRO 5 UNITS: 100 INJECTION, SOLUTION INTRAVENOUS; SUBCUTANEOUS at 08:38

## 2018-09-17 RX ADMIN — LABETALOL HYDROCHLORIDE 150 MG: 100 TABLET, FILM COATED ORAL at 05:57

## 2018-09-17 RX ADMIN — SODIUM BICARBONATE 650 MG TABLET 650 MG: at 08:38

## 2018-09-17 RX ADMIN — CALCIUM ACETATE 667 MG: 667 CAPSULE ORAL at 11:29

## 2018-09-17 RX ADMIN — HYDRALAZINE HYDROCHLORIDE 50 MG: 25 TABLET ORAL at 05:57

## 2018-09-17 RX ADMIN — NIFEDIPINE 60 MG: 30 TABLET, FILM COATED, EXTENDED RELEASE ORAL at 05:57

## 2018-09-17 RX ADMIN — SODIUM BICARBONATE 650 MG TABLET 650 MG: at 11:30

## 2018-09-17 RX ADMIN — INSULIN LISPRO 1 UNITS: 100 INJECTION, SOLUTION INTRAVENOUS; SUBCUTANEOUS at 08:39

## 2018-09-17 RX ADMIN — ESCITALOPRAM OXALATE 10 MG: 10 TABLET ORAL at 05:56

## 2018-09-17 RX ADMIN — CALCIUM ACETATE 667 MG: 667 CAPSULE ORAL at 08:37

## 2018-09-17 RX ADMIN — INSULIN GLARGINE 10 UNITS: 100 INJECTION, SOLUTION SUBCUTANEOUS at 08:38

## 2018-09-17 RX ADMIN — INSULIN LISPRO 5 UNITS: 100 INJECTION, SOLUTION INTRAVENOUS; SUBCUTANEOUS at 11:30

## 2018-09-17 NOTE — CASE MANAGEMENT
Initial Clinical Review    Admission: Date/Time/Statement: 9/16/2018  1409 OBSERVATION AND CHANGED TO INPATIENT 9/17/2018  0937 RE:  Patient admitted with LLUVIA, recently started manual PD QID, presented with nausea/vomiting, treated with IVF; creatinine has not improved on 9/17, will need additional stay, nephrology input pending,     Start   Ordered   09/17/18 0937  Inpatient Admission Once     Transfer Service: General Medicine       Question Answer Comment   Admitting Physician Julieanne Mcardle    Level of Care Med Surg    Estimated length of stay More than 2 Midnights    Certification I certify that inpatient services are medically necessary for this patient for a duration of greater than two midnights  See H&P and MD Progress Notes for additional information about the patient's course of treatment  09/17/18 5637             ED: Date/Time/Mode of Arrival:   ED Arrival Information     Expected Arrival Acuity Means of Arrival Escorted By Service Admission Type    - 9/16/2018 09:47 Urgent Walk-In Family Member Hospitalist Urgent    Arrival Complaint    vomiting/abd pain          Chief Complaint:   Chief Complaint   Patient presents with    Vomiting     Pt  c/o vomiting for three days with nausea  Pt  Type 1 diabetic last reading 189 @0900  Pt  took ondansetron @0700  History of Illness: 26 y o  male history of type 1 diabetes on insulin, end-stage renal disease newly started on PD who presents with nausea, vomiting and poor appetite since Friday  Patient states he has been unable to tolerate any oral intake since Friday due to nausea  He has a new start on PD has been doing his exchanges manually  No cloudy fluid observed  No pain in abdomen or around catheter site  He states he feels full despite emptying and as result has no appetite  Also having episodes of nausea with vomiting  Has loose stool    Has noted more labile blood sugars in the past few days as well was recently seen by Endocrinology who decreased his nighttime basal dose  ED Vital Signs:   ED Triage Vitals   Temperature Pulse Respirations Blood Pressure SpO2   09/16/18 1001 09/16/18 0956 09/16/18 0956 09/16/18 0956 09/16/18 0956   98 6 °F (37 °C) 93 20 130/66 100 %      Temp Source Heart Rate Source Patient Position - Orthostatic VS BP Location FiO2 (%)   09/16/18 1001 09/16/18 1221 09/16/18 1425 09/16/18 1425 --   Oral Monitor Lying Right arm       Pain Score       09/16/18 0956       No Pain        Wt Readings from Last 1 Encounters:   09/16/18 92 4 kg (203 lb 11 3 oz)       Vital Signs (abnormal): maximum temperature 99 1   Exam - malaise  Moderate suprapubic tenderness  Peritoneal dialysis cathter present LL abdomen  Abnormal Labs/Diagnostic Test Results: (baseline creatinine as of 8/16/2018  Was 7 1)  K 3 4  Cl 96  Bun 48  Creatinine 11 4  Wbc 8 68, hgb 10 7, hct 31 2    UA >=300 protein  1/10% glucose  Ct abdomen - No noncontrast CT abnormality to account for the patient's symptoms  Abdominopelvic fluid consistent with peritoneal dialysis   Expected appearance of peritoneal dialysis catheter  1644  Glucose 191  2107 glucose 197    9/17/2018-  Cl 98  Anion gap 16  Bun 48  Creatinine 11 53  Glucose 117  Wbc 7 47   hgb 8 8, hct 26 5       ED Treatment:   Medication Administration from 09/16/2018 0947 to 09/16/2018 1532       Date/Time Order Dose Route Action Comments     09/16/2018 1200 sodium chloride 0 9 % bolus 1,000 mL 0 mL Intravenous Stopped      09/16/2018 1057 sodium chloride 0 9 % bolus 1,000 mL 1,000 mL Intravenous New Bag      09/16/2018 1057 ondansetron (ZOFRAN) injection 4 mg 4 mg Intravenous Given           Past Medical/Surgical History:   Past Medical History:   Diagnosis Date    Acute kidney injury ([address] )     Cerebellar stroke side undetermined 2015 2015,1/2018    Diabetes type 1, controlled [address] [address]     History of shingles 2010    History of transfusion 02/2018    Hypertension     Muscle weakness        Admitting Diagnosis: Dehydration [E86 0]  Vomiting [R11 10]  Nausea & vomiting [R11 2]  Renovascular hypertension [I15 0]  Peritoneal dialysis catheter in place [facility]) [Z99 2]    Age/Sex: 26 y o  male    Assessment/Plan: This is a 26year old man who presents to ED with poor po intake related to nausea and vomiting  He recently started manual PD qid  Patient is suspected to have pre renal injury- LLUVIA, plan is observation, hold PD, hold torsemide, consult renal  IVF, antiemetics prn and trend BMP  Patient is a type 1 diabetic, has poor control with blood sugars in 300s, will continue lantus,  lispro with meals, sliding scale insulin with qid glucose  Admission Orders:  9/16/2018  1409 OBSERVATION  AND CHANGED TO INPATIENT 9/17/2018  8221  Scheduled Meds:   Current Facility-Administered Medications:  atorvastatin 40 mg Oral QPM   calcium acetate 667 mg Oral TID With Meals   clopidogrel 75 mg Oral Daily   escitalopram 10 mg Oral Daily   folic acid 1 mg Oral Daily   hydrALAZINE 50 mg Oral TID   insulin glargine 10 Units Subcutaneous Q12H [address]   insulin lispro 1-5 Units Subcutaneous TID AC   insulin lispro 1-6 Units Subcutaneous HS   insulin lispro 5 Units Subcutaneous TID With Meals   labetalol 150 mg Oral BID   NIFEdipine ER 60 mg Oral Daily   ondansetron 4 mg Intravenous Q4H PRN   sodium bicarbonate 650 mg Oral 4x Daily     Continuous Infusions:     multi-electrolyte (ISOLYTE-S PH 7 4 equivalent) IV solution  Rate: 100 mL/hr Dose: 100 mL/hr  Freq: Continuous Route: IV  Last Dose: Stopped (09/17/18 0335)  Start: 09/16/18 1545 End: 09/17/18 0251    PRN Meds: ondansetron - not used     OTHER ORDERS:  Fingerstick glucose qid  Consult nephrology      Thank you,  [address] [facility] Review Department  Phone: [phone];  Fax [phone]  ATTENTION: Please call with any questions or concerns to [phone]  and carefully follow the prompts so that you are directed to the right person  Send all requests for admission clinical reviews, approved or denied determinations and any other requests to fax [phone]   All voicemails are confidential

## 2018-09-17 NOTE — ASSESSMENT & PLAN NOTE
Over all blood sugars moderately elevated   Labetalol 150mg qam, afternoon; 300mg in the evening  Hydralazine 100mg TID  Nifedipine 60mg BID  Hold torsemide   Renal input pending

## 2018-09-17 NOTE — PROGRESS NOTES
Progress Note Ashok Pollack 1985, 26 y o  male MRN: [id number]    Unit/Bed#: -01 Encounter: [id number]    Primary Care Provider: Mariella Solomon DO   Date and time admitted to hospital: 9/16/2018 10:23 AM    * Acute kidney injury [facility])   Assessment & Plan    · Recently started on manual PD QID  Presented with nausea,vomiting,  increased fullness, poor oral intake, decreased UOP  · Reports feeling symptomatically improved today and was able to eat  · Creatinine has not improved however; await consult from nephrology to determine additional management  · IVF; trend BMP            Nausea and vomitingresolved as of 9/17/2018   Assessment & Plan    Suspect this is related to new start on PD; now improving  Antiemetics          Type 1 diabetes mellitus with diabetic nephropathy, with long-term current use of insulin [facility])   Assessment & Plan    Lab Results   Component Value Date    HGBA1C 4 9 09/08/2018       Recent Labs      09/16/18   1644  09/16/18   2107  09/17/18   0808   POCGLU  191*  197*  157*       Blood Sugar Average: Last 72 hrs:  (P) 576 [id number]   Reports poor control at home with levels in 300s   Home regimen lantus 11units qam and 10units qpm   Lispro 5 units with meals  + ISS   Consider endocrine consult if difficult to control --currently seems to be stable at this time however  He recently saw them at the clinic because of his insurance        Renovascular hypertension   Assessment & Plan    Over all blood sugars moderately elevated   Labetalol 150mg qam, afternoon; 300mg in the evening  Hydralazine 100mg TID  Nifedipine 60mg BID  Hold torsemide   Renal input pending        Anemia in stage 3 chronic kidney disease   Assessment & Plan    · Monitor blood counts          VTE Pharmacologic Prophylaxis: low risk  Pharmacologic:low risk  Mechanical VTE Prophylaxis in Place: No    Patient Centered Rounds: I have performed bedside rounds with nursing staff today      Discussions with Specialists or Other Care Team Provider:     Education and Discussions with Family / Patient: parents at bedside    Time Spent for Care: 30 minutes  More than 50% of total time spent on counseling and coordination of care as described above  Current Length of Stay: 0 day(s)    Current Patient Status: Observation   Certification Statement: The patient, admitted on an observation basis, will now require > 2 midnight hospital stay due to unchanged LLUVIA; suspect patient will not be stable for discharge today but await Nephrology input    Discharge Plan:  Home when stable with Nephrology, doubtful to happen today with unchanged creatinine    Code Status: Level 1 - Full Code      Subjective:   Patient reports he is feeling better  He states that he ate dinner last night and a good breakfast this morning  He is not feeling nausea or vomiting  He had transient abdominal discomfort with taking a deep breath yesterday which has already resolved  He denies any confusion, tremor, headache, or feeling of being volume overloaded    Objective:     Vitals:   Temp (24hrs), Av 8 °F (37 1 °C), Min:98 6 °F (37 °C), Max:99 1 °F (37 3 °C)    HR:  [85-97] 87  Resp:  [14-20] 16  BP: (130-170)/(66-93) 164/82  SpO2:  [97 %-100 %] 98 %  Body mass index is 28 41 kg/m²  Input and Output Summary (last 24 hours): Intake/Output Summary (Last 24 hours) at 18 0904  Last data filed at 18 0603   Gross per 24 hour   Intake              480 ml   Output              650 ml   Net             -170 ml       Physical Exam:     Physical Exam   Constitutional: He appears well-developed and well-nourished  No distress  HENT:   Head: Normocephalic and atraumatic  Eyes: Conjunctivae are normal  Right eye exhibits no discharge  Left eye exhibits no discharge  No scleral icterus  Neck: Neck supple  Cardiovascular: Normal rate, regular rhythm and normal heart sounds  No murmur heard    Pulmonary/Chest: Effort normal and breath sounds normal  No respiratory distress  He has no wheezes  He has no rales  Abdominal: Soft  He exhibits no distension  There is no tenderness  Musculoskeletal: He exhibits no edema  Neurological: He is alert  Awake alert and interactive with no evidence of confusion or tremor   Skin: Skin is warm and dry  No rash noted  He is not diaphoretic  No erythema  No pallor  Psychiatric: He has a normal mood and affect  His behavior is normal  Thought content normal    Vitals reviewed  Additional Data:     Labs:      Results from last 7 days  Lab Units 09/17/18  0606   WBC Thousand/uL 7 47   HEMOGLOBIN g/dL 8 8*   HEMATOCRIT % 26 5*   PLATELETS Thousands/uL 305   NEUTROS PCT % 59   LYMPHS PCT % 28   MONOS PCT % 8   EOS PCT % 4       Results from last 7 days  Lab Units 09/17/18  0606 09/16/18  1058   SODIUM mmol/L 142 138   POTASSIUM mmol/L 3 5 3 4*   CHLORIDE mmol/L 98* 96*   CO2 mmol/L 28 32   BUN mg/dL 48* 48*   CREATININE mg/dL 11 53* 11 74*   CALCIUM mg/dL 9 0 9 1   ALK PHOS U/L  --  79   ALT U/L  --  21   AST U/L  --  17       Results from last 7 days  Lab Units 09/16/18  1058   INR  1 05       Results from last 7 days  Lab Units 09/17/18  0808 09/16/18  2107 09/16/18  1644   POC GLUCOSE mg/dl 157* 197* 191*           * I Have Reviewed All Lab Data Listed Above  * Additional Pertinent Lab Tests Reviewed:  [facility] Reviewed    Imaging:    Imaging Reports Reviewed Today Include:   Imaging Personally Reviewed by Myself Includes:      Recent Cultures (last 7 days):           Last 24 Hours Medication List:     Current Facility-Administered Medications:  atorvastatin 40 mg Oral QPM Bassem Champagne MD   calcium acetate 667 mg Oral TID With Meals Bassem Champagne MD   clopidogrel 75 mg Oral Daily Bassem Champagne MD   escitalopram 10 mg Oral Daily Bassem Champagne MD   folic acid 1 mg Oral Daily Bassem Champagne MD   hydrALAZINE 50 mg Oral TID Bassem Champagne MD   insulin glargine 10 Units Subcutaneous Q12H Mago Barrett MD   insulin lispro 1-5 Units Subcutaneous TID David Reed MD   insulin lispro 1-6 Units Subcutaneous HS Maxine Gallegos MD   insulin lispro 5 Units Subcutaneous TID With Meals Maxine Gallegos MD   labetalol 150 mg Oral BID Maxine Gallegos MD   NIFEdipine ER 60 mg Oral Daily Maxine Gallegos MD   ondansetron 4 mg Intravenous Q4H PRN Maxine Gallegos MD   sodium bicarbonate 650 mg Oral 4x Daily Maxine Gallegos MD        Today, Patient Was Seen By: Nivia Messer PA-C    ** Please Note: Dictation voice to text software may have been used in the creation of this document   **

## 2018-09-17 NOTE — DISCHARGE SUMMARY
Please see progress note from earlier today  Initially we expected the patient would be staying another night in the hospital but upon discussing the case with Nephrology later in the day and re-evaluating the patient, it seems he is doing better and is well enough to go home today    Discharging Physician / Practitioner: Dwain Pugh PA-C  PCP: Henny Hewitt DO  Admission Date:   Admission Orders     Ordered        09/17/18 0937  Inpatient Admission  Once         09/16/18 1411  Place in Observation (expected length of stay for this patient is less than two midnights)  Once             Discharge Date: 09/17/18    Resolved Problems  Date Reviewed: 9/17/2018          Resolved    Nausea and vomiting 9/17/2018     Resolved by  Dwain Pugh PA-C        Consultations During Hospital Stay:  · Nephrology    Procedures Performed:     September 16th CT scan of the abdomen and pelvis:No noncontrast CT abnormality to account for the patient's symptoms  Abdominopelvic fluid consistent with peritoneal dialysis  Expected appearance of peritoneal dialysis catheter  Significant Findings / Test Results:     · See above    Incidental Findings:   · none    Test Results Pending at Discharge (will require follow up):   · none     Outpatient Tests Requested:  · Routine BMP, CBC and phosphorus    Complications:  none    Reason for Admission:  Nausea and vomiting    Hospital Course: Loius Moscoso is a 26 y o  male patient who originally presented to the hospital on 9/16/2018 due to poor appetite, nausea and vomiting, and sense of abdominal fullness  This patient with end-stage renal disease and type 1 diabetes had recently been started on peritoneal dialysis 2 weeks ago  Upon admission to the hospital it was noted that his creatinine was elevated at 11 74  He was seen in consultation by Nephrology  He had initially received IV fluids on admission and his dialysis treatment was held    Today we provided him with a 2 L filling and he felt well afterward  He has been able to eat and drink well yesterday and today  His blood sugars and blood pressure remained stable  If his nausea and vomiting return, he should be evaluated by GI for possible diabetic gastroparesis    Please see above list of diagnoses and related plan for additional information  Condition at Discharge: stable     Discharge Day Visit / Exam:     * Please refer to separate progress note for these details *    Discussion with Family:  At bedside    Discharge instructions/Information to patient and family:   See after visit summary for information provided to patient and family  Provisions for Follow-Up Care:  See after visit summary for information related to follow-up care and any pertinent home health orders  Disposition:     Home    For Discharges to [facility] SNF:   · Not Applicable to this Patient - Not Applicable to this Patient    Planned Readmission: none     Discharge Statement:  I spent 45 minutes discharging the patient  This time was spent on the day of discharge  I had direct contact with the patient on the day of discharge  Greater than 50% of the total time was spent examining patient, answering all patient questions, arranging and discussing plan of care with patient as well as directly providing post-discharge instructions  Additional time then spent on discharge activities  Case discussed with Nephrology    Discharge Medications:  See after visit summary for reconciled discharge medications provided to patient and family        ** Please Note: This note has been constructed using a voice recognition system **

## 2018-09-17 NOTE — ASSESSMENT & PLAN NOTE
· Recently started on manual PD QID    Presented with nausea,vomiting,  increased fullness, poor oral intake, decreased UOP  · Reports feeling symptomatically improved today and was able to eat  · Creatinine has not improved however; await consult from nephrology to determine additional management  · IVF; trend BMP

## 2018-09-17 NOTE — ASSESSMENT & PLAN NOTE
Lab Results   Component Value Date    HGBA1C 4 9 09/08/2018       Recent Labs      09/16/18   1644  09/16/18   2107  09/17/18   0808   POCGLU  191*  197*  157*       Blood Sugar Average: Last 72 hrs:  (P) 366 [id number]   Reports poor control at home with levels in 300s   Home regimen lantus 11units qam and 10units qpm   Lispro 5 units with meals  + ISS   Consider endocrine consult if difficult to control --currently seems to be stable at this time however    He recently saw them at the clinic because of his insurance

## 2018-09-18 ENCOUNTER — TELEPHONE (OUTPATIENT)
Dept: INTERNAL MEDICINE CLINIC | Facility: CLINIC | Age: 33
End: 2018-09-18

## 2018-09-18 NOTE — CASE MANAGEMENT
Notification of Discharge  This is a Notification of Discharge from our facility [address]  Please be advised that this patient has been discharge from our facility  Below you will find the admission and discharge date and time including the patients disposition  PRESENTATION DATE: 9/16/2018 10:23 AM  IP ADMISSION DATE: 9/17/18 0937  DISCHARGE DATE: 9/17/2018  3:43 PM  DISPOSITION: [address] [place] [address] in the [facility] [place] Util[facility] Review Department  Phone: [phone]; Fax [phone]  ATTENTION: The Network Utilization Review Department is now centralized for our 9 Facilities  Make a note that we have a new phone and fax numbers for our Department  Please call with any questions or concerns to [phone] and carefully follow the prompts so that you are directed to the right person  All voicemails are confidential  Fax any determinations, approvals, denials, and requests for initial or continue stay review clinical to [phone]  Due to HIGH CALL volume, it would be easier if you could please send faxed requests to expedite your requests and in part, help us provide discharge notifications faster    Reference #[id number]

## 2018-09-20 ENCOUNTER — TRANSITIONAL CARE MANAGEMENT (OUTPATIENT)
Dept: INTERNAL MEDICINE CLINIC | Facility: CLINIC | Age: 33
End: 2018-09-20

## 2018-09-24 ENCOUNTER — OFFICE VISIT (OUTPATIENT)
Dept: INTERNAL MEDICINE CLINIC | Facility: CLINIC | Age: 33
End: 2018-09-24
Payer: COMMERCIAL

## 2018-09-24 VITALS
BODY MASS INDEX: 28.98 KG/M2 | HEART RATE: 84 BPM | HEIGHT: 71 IN | OXYGEN SATURATION: 98 % | RESPIRATION RATE: 16 BRPM | WEIGHT: 207 LBS | SYSTOLIC BLOOD PRESSURE: 180 MMHG | DIASTOLIC BLOOD PRESSURE: 100 MMHG | TEMPERATURE: 98 F

## 2018-09-24 DIAGNOSIS — Z00.8 ENCOUNTER FOR ELECTROCARDIOGRAM: ICD-10-CM

## 2018-09-24 DIAGNOSIS — H50.9 STRABISMUS: ICD-10-CM

## 2018-09-24 DIAGNOSIS — N18.6 END-STAGE RENAL DISEASE ON PERITONEAL DIALYSIS (HCC): ICD-10-CM

## 2018-09-24 DIAGNOSIS — I15.0 RENOVASCULAR HYPERTENSION: ICD-10-CM

## 2018-09-24 DIAGNOSIS — Z99.2 END-STAGE RENAL DISEASE ON PERITONEAL DIALYSIS (HCC): ICD-10-CM

## 2018-09-24 DIAGNOSIS — E10.21 TYPE 1 DIABETES MELLITUS WITH DIABETIC NEPHROPATHY, WITH LONG-TERM CURRENT USE OF INSULIN (HCC): Primary | Chronic | ICD-10-CM

## 2018-09-24 DIAGNOSIS — Z23 NEED FOR INFLUENZA VACCINATION: ICD-10-CM

## 2018-09-24 PROCEDURE — 1111F DSCHRG MED/CURRENT MED MERGE: CPT | Performed by: INTERNAL MEDICINE

## 2018-09-24 PROCEDURE — 90471 IMMUNIZATION ADMIN: CPT

## 2018-09-24 PROCEDURE — 90686 IIV4 VACC NO PRSV 0.5 ML IM: CPT

## 2018-09-24 PROCEDURE — 99496 TRANSJ CARE MGMT HIGH F2F 7D: CPT | Performed by: INTERNAL MEDICINE

## 2018-09-24 PROCEDURE — 93000 ELECTROCARDIOGRAM COMPLETE: CPT | Performed by: INTERNAL MEDICINE

## 2018-09-24 RX ORDER — ERGOCALCIFEROL 1.25 MG/1
50000 CAPSULE ORAL
COMMUNITY
Start: 2018-08-27 | End: 2019-04-09 | Stop reason: SDUPTHER

## 2018-09-24 NOTE — ASSESSMENT & PLAN NOTE
BP meds adjusted due to dehydration  BP have been elevated since    Patient is scheduled for follow up with Nephro tomorrow and will defer adjustments to them

## 2018-09-24 NOTE — PROGRESS NOTES
Assessment/Plan:    Type 1 diabetes mellitus with diabetic nephropathy, with long-term current use of insulin (HCC)  DM1 with nephropathy - his FBS have been elevated since starting overnight PD, likely due to dialysate  Recommend increasing Basaglar back to 10u in AM and 12u in PM, continue on lispro 5units TID  Renovascular hypertension  BP meds adjusted due to dehydration  BP have been elevated since  Patient is scheduled for follow up with Nephro tomorrow and will defer adjustments to them    End-stage renal disease on peritoneal dialysis [facility])  PD started several weeks ago per Nephro    Strabismus  Scheduled for BMBR with adjustable sutures on 10/22/18 by Dr Kevin Cruz  He is moderate to high risk of developing a major adverse clinical event for low risk eye surgery  1  Type 1 diabetes mellitus with diabetic nephropathy, with long-term current use of insulin ([facility] [address] )     2  End-stage renal disease on peritoneal dialysis ([facility] [address] )     3  Renovascular hypertension     4  Strabismus     5  Encounter for electrocardiogram  POCT ECG   6  Need for influenza vaccination  SYRINGE/SINGLE-DOSE VIAL: influenza vaccine, [id number], quadrivalent, 0 5 mL, preservative-free, for patients 3+ yr (FLUZONE)       Subjective:      Patient ID: Sabra Branham is a 26 y o  male  HPI  Date and time hospital follow up call was made:  9/18/2018 10:31 AM  Hospital care reviewed:  Records reviewed  Patient was hopsitalized at:  [facility]  Date of admission:  9/16/18  Date of discharge:  9/17/18  Diagnosis:  Acute Kidney Injury  Disposition:  Home  Were the patients medicaitons reviewed and updated:  Yes  Scheduled for follow up?:  Yes  I have advised the patient to call PCP with any new or worsening symptoms (please type in name along with any credentials):   Roberts Staff CMA  Counseling:  Patient  Comments:  Patient's appt scheduled for 9/24/18     34yo male with DM1 with nephropathy, HLD, HTN, ESRD on PD with cerebellar stroke and L pontine CVA here for OZ  He is accompanied by his mother  He was hospitalized at [facility] 9/16-9/17/18 for LLUVIA secondary to poor appetite, n/v and abd fullness  His creatine was 11 74  Weight was down to 193pounds  He had started PD two weeks prior  He received IVF with improvement  His bp medications were adjusted as well  He was discontinued off torsemide, chlorthalidone and clonidine  Nifedipine was decreased from 60mg bid to once daily, labetalol was decreased from 300mg to 150mg bid and hydralazine was decreased from 100mg to 1/2tab tid  Since discharge, his weights have gradually increased  He denies LE swelling, SOB, nausea  BP around 190/111  He established with Endo early September and insulin regimen was adjusted due to an A1c of 4 9  Terril Nine was decreased to 10units bit  At the time he was having dialysis four times daily but started nightly dialysis one week ago  His FBS have been upper 200-300s with highest at 440  His mother reports he is sleeping less, dizziness is about the same  Eye surgery planned 10/22/18 for strabismus by Dr Justina Shipman      The following portions of the patient's history were reviewed and updated as appropriate: allergies, current medications, past family history, past medical history, past social history, past surgical history and problem list     Current Outpatient Prescriptions:     acetaminophen (TYLENOL) 325 mg tablet, Take 650 mg tylenol as needed every 6-8 hours for pain , Disp: 30 tablet, Rfl: 0    atorvastatin (LIPITOR) 40 mg tablet, Take 1 tablet (40 mg total) by mouth every evening, Disp: 90 tablet, Rfl: 1    B-D ULTRAFINE III SHORT PEN 31G X 8 MM MISC, Inject under the skin 4 (four) times a day, Disp: 400 each, Rfl: 3    calcium acetate (PHOSLO) 667 mg capsule, Take 1 capsule (667 mg total) by mouth 3 (three) times a day with meals, Disp: , Rfl: 0    Cholecalciferol (VITAMIN D) 2000 units CAPS, Take 1 capsule by mouth daily, Disp: , Rfl:     clopidogrel (PLAVIX) 75 mg tablet, Take 1 tablet (75 mg total) by mouth daily, Disp: 90 tablet, Rfl: 1    ergocalciferol (VITAMIN D2) 50,000 units, , Disp: , Rfl:     escitalopram (LEXAPRO) 10 mg tablet, Take 1 tablet (10 mg total) by mouth daily, Disp: 90 tablet, Rfl: 1    folic acid (FOLVITE) 1 mg tablet, Take 1 tablet (1 mg total) by mouth daily, Disp: 90 tablet, Rfl: 1    hydrALAZINE (APRESOLINE) 100 MG tablet, Take 0 5 tablets (50 mg total) by mouth 3 (three) times a day, Disp: , Rfl:     insulin glargine (BASAGLAR KWIKPEN) 100 units/mL injection pen, Inject 10u sc in AM and 12u in PM (Patient taking differently: Inject 10 Units under the skin 2 (two) times a day Inject 10u sc in AM and 12u in PM ), Disp: 5 pen, Rfl: 5    insulin lispro (ADMELOG SOLOSTAR) 100 units/mL injection pen, Inject 5 Units under the skin 3 (three) times a day with meals, Disp: 5 pen, Rfl: 3    labetalol (NORMODYNE) 300 mg tablet, Take 0 5 tablets (150 mg total) by mouth every 8 (eight) hours (Patient taking differently: Take 150 mg by mouth daily  ), Disp: 60 tablet, Rfl: 3    NIFEdipine ER (ADALAT CC) 60 MG 24 hr tablet, Take 1 tablet (60 mg total) by mouth daily, Disp: 180 tablet, Rfl: 0    ondansetron (ZOFRAN) 4 mg tablet, Take 1 tablet (4 mg total) by mouth every 8 (eight) hours as needed for nausea or vomiting, Disp: 30 tablet, Rfl: 3    oxyCODONE-acetaminophen (PERCOCET) 5-325 mg per tablet, Take 2 tablets by mouth every 4 (four) hours as needed for moderate pain for up to 20 doses Max Daily Amount: 12 tablets, Disp: 28 tablet, Rfl: 0    sodium bicarbonate 650 mg tablet, Take 1 tablet (650 mg total) by mouth 4 (four) times a day, Disp: 120 tablet, Rfl: 6      Review of Systems   Constitutional: Positive for fatigue and unexpected weight change  HENT: Negative  Respiratory: Negative  Cardiovascular: Negative  Gastrointestinal: Negative      Genitourinary:        Urinary frequency   Neurological: Positive for dizziness  Negative for headaches  Objective:    BP (!) 180/100 (BP Location: Left arm, Patient Position: Sitting)   Pulse 84   Temp 98 °F (36 7 °C)   Resp 16   Ht 5' 11" (1 803 m)   Wt 93 9 kg (207 lb)   SpO2 98%   BMI 28 87 kg/m²      Physical Exam   Constitutional: He is oriented to person, place, and time  He appears well-developed and well-nourished  HENT:   Mouth/Throat: Oropharynx is clear and moist    Eyes: Conjunctivae are normal  Pupils are equal, round, and reactive to light  Neck: Neck supple  Cardiovascular: Normal rate, regular rhythm, normal heart sounds and intact distal pulses  No BLE edema   Pulmonary/Chest: Effort normal and breath sounds normal  No respiratory distress  He has no wheezes  Abdominal: Soft  Bowel sounds are normal  He exhibits no distension  There is no tenderness  There is no rigidity  PD catheter intact   Neurological: He is alert and oriented to person, place, and time  Psychiatric: He has a normal mood and affect  Vitals reviewed        EKG: NSR at 74bpm, normal axis, normal EKG

## 2018-09-24 NOTE — ASSESSMENT & PLAN NOTE
Scheduled for BMBR with adjustable sutures on 10/22/18 by Dr Jamarcus Arroyo  He is moderate to high risk of developing a major adverse clinical event for low risk eye surgery

## 2018-09-24 NOTE — ASSESSMENT & PLAN NOTE
DM1 with nephropathy - his FBS have been elevated since starting overnight PD, likely due to dialysate  Recommend increasing Basaglar back to 10u in AM and 12u in PM, continue on lispro 5units TID

## 2018-10-19 ENCOUNTER — TELEPHONE (OUTPATIENT)
Dept: NEPHROLOGY | Facility: CLINIC | Age: 33
End: 2018-10-19

## 2018-10-19 NOTE — TELEPHONE ENCOUNTER
Called and spoke to patients mother  They are aware of attempts made by [facility] to reach out  I encouraged them to call and discuss the insurance and financial concerns with [facility] so barriers can be addressed  Mother Eleno Rain) stated she will have him call this afternoon  Thank you

## 2018-11-05 ENCOUNTER — TELEPHONE (OUTPATIENT)
Dept: CARDIOLOGY CLINIC | Facility: CLINIC | Age: 33
End: 2018-11-05

## 2018-11-05 DIAGNOSIS — F33.9 EPISODE OF RECURRENT MAJOR DEPRESSIVE DISORDER, UNSPECIFIED DEPRESSION EPISODE SEVERITY (HCC): ICD-10-CM

## 2018-11-05 RX ORDER — ESCITALOPRAM OXALATE 10 MG/1
10 TABLET ORAL DAILY
Qty: 90 TABLET | Refills: 2 | Status: SHIPPED | OUTPATIENT
Start: 2018-11-05 | End: 2019-08-05 | Stop reason: SDUPTHER

## 2018-11-09 ENCOUNTER — OFFICE VISIT (OUTPATIENT)
Dept: INTERNAL MEDICINE CLINIC | Facility: CLINIC | Age: 33
End: 2018-11-09
Payer: COMMERCIAL

## 2018-11-09 VITALS
DIASTOLIC BLOOD PRESSURE: 76 MMHG | OXYGEN SATURATION: 98 % | TEMPERATURE: 99.1 F | BODY MASS INDEX: 29.68 KG/M2 | HEIGHT: 71 IN | HEART RATE: 84 BPM | WEIGHT: 212 LBS | SYSTOLIC BLOOD PRESSURE: 166 MMHG

## 2018-11-09 DIAGNOSIS — Z99.2 END-STAGE RENAL DISEASE ON PERITONEAL DIALYSIS (HCC): ICD-10-CM

## 2018-11-09 DIAGNOSIS — R19.7 DIARRHEA, UNSPECIFIED TYPE: Primary | ICD-10-CM

## 2018-11-09 DIAGNOSIS — E10.21 TYPE 1 DIABETES MELLITUS WITH DIABETIC NEPHROPATHY, WITH LONG-TERM CURRENT USE OF INSULIN (HCC): Chronic | ICD-10-CM

## 2018-11-09 DIAGNOSIS — N18.6 END-STAGE RENAL DISEASE ON PERITONEAL DIALYSIS (HCC): ICD-10-CM

## 2018-11-09 PROBLEM — N17.9 ACUTE KIDNEY INJURY (HCC): Status: RESOLVED | Noted: 2018-02-11 | Resolved: 2018-11-09

## 2018-11-09 PROBLEM — K59.09 OTHER CONSTIPATION: Status: RESOLVED | Noted: 2018-08-09 | Resolved: 2018-11-09

## 2018-11-09 PROBLEM — D63.1 ANEMIA IN STAGE 3 CHRONIC KIDNEY DISEASE (HCC): Chronic | Status: RESOLVED | Noted: 2018-02-10 | Resolved: 2018-11-09

## 2018-11-09 PROBLEM — E87.5 HYPERKALEMIA: Status: RESOLVED | Noted: 2018-02-10 | Resolved: 2018-11-09

## 2018-11-09 PROBLEM — N18.30 ANEMIA IN STAGE 3 CHRONIC KIDNEY DISEASE (HCC): Chronic | Status: RESOLVED | Noted: 2018-02-10 | Resolved: 2018-11-09

## 2018-11-09 PROBLEM — Z00.8 ENCOUNTER FOR ELECTROCARDIOGRAM: Status: RESOLVED | Noted: 2018-09-24 | Resolved: 2018-11-09

## 2018-11-09 PROCEDURE — 3008F BODY MASS INDEX DOCD: CPT | Performed by: INTERNAL MEDICINE

## 2018-11-09 PROCEDURE — 99214 OFFICE O/P EST MOD 30 MIN: CPT | Performed by: INTERNAL MEDICINE

## 2018-11-09 PROCEDURE — 3066F NEPHROPATHY DOC TX: CPT | Performed by: INTERNAL MEDICINE

## 2018-11-09 PROCEDURE — 1036F TOBACCO NON-USER: CPT | Performed by: INTERNAL MEDICINE

## 2018-11-09 RX ORDER — TORSEMIDE 20 MG/1
100 TABLET ORAL DAILY
COMMUNITY
Start: 2018-10-16 | End: 2019-09-06

## 2018-11-09 NOTE — ASSESSMENT & PLAN NOTE
BS labile, high in AM   Patient nervous adjusting evening dose of Basaglar, therefore advised increasing dose of basaglar to 14units in AM and continuing with 12units in PM   Continue on Lispro 5units TID  He is also followed by Endo    Next a1c due 12/2018

## 2018-11-09 NOTE — ASSESSMENT & PLAN NOTE
Secondary to DM  On nightly PD  Followed by Nephro    Patient considering transplant for kidney and pancreas through [facility]

## 2018-11-09 NOTE — PROGRESS NOTES
Assessment/Plan:    **11/15/18 Addendum: Per Dr Ruthie Martinez, patient is scheduled for [facility] with adjustable sutures and LF with adjustable sutures 11/16/18 secondary to strabismus  He is moderate to high risk of developing a major adverse clinical event for low risk eye surgery  Diarrhea  Loose, water frequent stools  Will r/o C dif due to contacts in health care settings    Type 1 diabetes mellitus with diabetic nephropathy, with long-term current use of insulin (HCC)  BS labile, high in AM   Patient nervous adjusting evening dose of Basaglar, therefore advised increasing dose of basaglar to 14units in AM and continuing with 12units in PM   Continue on Lispro 5units TID  He is also followed by Endo  Next a1c due 12/2018    End-stage renal disease on peritoneal dialysis ([facility] [address] )  Secondary to DM  On nightly PD  Followed by Nephro  Patient considering transplant for kidney and pancreas through [facility]    1  Diarrhea, unspecified type  Clostridium difficile toxin by PCR   2  Type 1 diabetes mellitus with diabetic nephropathy, with long-term current use of insulin (HCC)     3  End-stage renal disease on peritoneal dialysis (HCC)         Subjective:      Patient ID: Nissa Heaton is a 26 y o  male  HPI  29yo male with DM1 with nephropathy, ESRD on PD, HLD, HTN with h/o cerebellar stroke and L pontine CVA here for follow up  He is accompanied by his mother  His eye surgery was rescheduled for 11/16/18  Family is working on getting him placed in transplast list for kidney and pancreas at [place], they will be attending an info meeting in the near future  Does nightly PD dialysis, notices BS in AM high, sometimes in 400s, BS throughout of the day are labile  Denies hypoglycemic episodes  He is now taking Basaglar 12units BID and remains on lispro 5units TID      For past week sbp >200 in AM, torsemide was adjusted to 20mg in AM and two tabs in PM   Appetite is improved, nausea improved, weight has increased  However, he has had loose liquid stools x 3-4x per day since he had dialysis catheter placed  Baseline BM was twice daily and formed  Not associated with food intake  No abdominal pain      The following portions of the patient's history were reviewed and updated as appropriate: allergies, current medications, past family history, past medical history, past social history, past surgical history and problem list     Current Outpatient Prescriptions:     atorvastatin (LIPITOR) 40 mg tablet, Take 1 tablet (40 mg total) by mouth every evening, Disp: 90 tablet, Rfl: 1    B-D ULTRAFINE III SHORT PEN 31G X 8 MM MISC, Inject under the skin 4 (four) times a day, Disp: 400 each, Rfl: 3    calcium acetate (PHOSLO) 667 mg capsule, Take 1 capsule (667 mg total) by mouth 3 (three) times a day with meals, Disp: , Rfl: 0    Cholecalciferol (VITAMIN D) 2000 units CAPS, Take 1 capsule by mouth daily, Disp: , Rfl:     clopidogrel (PLAVIX) 75 mg tablet, Take 1 tablet (75 mg total) by mouth daily, Disp: 90 tablet, Rfl: 1    ergocalciferol (VITAMIN D2) 50,000 units, , Disp: , Rfl:     escitalopram (LEXAPRO) 10 mg tablet, Take 1 tablet (10 mg total) by mouth daily, Disp: 90 tablet, Rfl: 2    folic acid (FOLVITE) 1 mg tablet, Take 1 tablet (1 mg total) by mouth daily, Disp: 90 tablet, Rfl: 1    hydrALAZINE (APRESOLINE) 100 MG tablet, Take 0 5 tablets (50 mg total) by mouth 3 (three) times a day (Patient taking differently: Take by mouth 2 (two) times a day 50mg twice a day and 100mg once at bedtime ), Disp: , Rfl:     insulin glargine (BASAGLAR KWIKPEN) 100 units/mL injection pen, Inject 10u sc in AM and 12u in PM (Patient taking differently: Inject 10 Units under the skin 2 (two) times a day Inject 10u sc in AM and 12u in PM ), Disp: 5 pen, Rfl: 5    insulin lispro (ADMELOG SOLOSTAR) 100 units/mL injection pen, Inject 5 Units under the skin 3 (three) times a day with meals, Disp: 5 pen, Rfl: 3    labetalol (NORMODYNE) 300 mg tablet, Take 0 5 tablets (150 mg total) by mouth every 8 (eight) hours (Patient taking differently: Take 300 mg by mouth 3 (three) times a day  ), Disp: 60 tablet, Rfl: 3    NIFEdipine ER (ADALAT CC) 60 MG 24 hr tablet, Take 1 tablet (60 mg total) by mouth daily (Patient taking differently: Take 60 mg by mouth 2 (two) times a day  ), Disp: 180 tablet, Rfl: 0    ondansetron (ZOFRAN) 4 mg tablet, Take 1 tablet (4 mg total) by mouth every 8 (eight) hours as needed for nausea or vomiting, Disp: 30 tablet, Rfl: 3    sodium bicarbonate 650 mg tablet, Take 1 tablet (650 mg total) by mouth 4 (four) times a day, Disp: 120 tablet, Rfl: 6    torsemide (DEMADEX) 20 mg tablet, Take 20 mg by mouth One tablets in the morning and two tablets at night , Disp: , Rfl:     Review of Systems   Constitutional: Positive for appetite change and fatigue  Negative for fever  Weight gain   HENT: Negative  Eyes: Positive for visual disturbance  Respiratory: Negative  Cardiovascular: Negative  Gastrointestinal: Positive for diarrhea  Negative for blood in stool and nausea  Genitourinary:        Urinary frequency   Neurological: Positive for dizziness  Negative for headaches  Objective:    /76   Pulse 84   Temp 99 1 °F (37 3 °C)   Ht 5' 11" (1 803 m)   Wt 96 2 kg (212 lb)   SpO2 98%   BMI 29 57 kg/m²      Physical Exam   Constitutional: He is oriented to person, place, and time  He appears well-developed and well-nourished  No distress  HENT:   Mouth/Throat: Oropharynx is clear and moist    Eyes: Pupils are equal, round, and reactive to light  Neck: Neck supple  Cardiovascular: Normal rate, regular rhythm, normal heart sounds and intact distal pulses  trace BLE edema   Pulmonary/Chest: Effort normal and breath sounds normal  No respiratory distress  He has no wheezes  Abdominal: Soft  Bowel sounds are normal  He exhibits no distension  There is no tenderness   There is no rigidity  PD catheter intact   Neurological: He is alert and oriented to person, place, and time  Psychiatric: He has a normal mood and affect  Vitals reviewed

## 2018-11-15 ENCOUNTER — TELEPHONE (OUTPATIENT)
Dept: INTERNAL MEDICINE CLINIC | Facility: CLINIC | Age: 33
End: 2018-11-15

## 2018-11-15 NOTE — TELEPHONE ENCOUNTER
I placed an addendum on my 11/9/18 note, which was the last time I saw the patient  Please print and fax to the eye doctor  Thanks

## 2018-11-15 NOTE — TELEPHONE ENCOUNTER
PT IS CALLING IN REGARDS TO INSULIN (ADMELOG SOLOSTAR)  PT IS STATING HE NEEDS 10 UNITS INSTEAD OF 5 UNITS BECAUSE IT IS A SHORT ACTING INSULIN  PT STATED HE IS GOING THROUGH THE PENS FASTER W/ ONLY 5 UNITS

## 2018-11-15 NOTE — TELEPHONE ENCOUNTER
Pt came in for his pre-op clearance on 9/24  He is getting surgery tomorrow, but the office called because Dr Abril Gomez added an addition eye muscle he would like to do surgery on  They asked if you would be able to add an addendum on the note to include the following:    BMRR with adjustable sutures and LFRR with adjustable sutures  Addended note can be faxed to direct F# [phone]    Will call Ozzy Jackman back at P# [phone] to let her know it was addended

## 2018-11-19 ENCOUNTER — TRANSCRIBE ORDERS (OUTPATIENT)
Dept: LAB | Facility: CLINIC | Age: 33
End: 2018-11-19

## 2018-11-19 DIAGNOSIS — E10.21 TYPE 1 DIABETES MELLITUS WITH DIABETIC NEPHROPATHY, WITH LONG-TERM CURRENT USE OF INSULIN (HCC): Chronic | ICD-10-CM

## 2018-11-20 ENCOUNTER — TELEPHONE (OUTPATIENT)
Dept: INTERNAL MEDICINE CLINIC | Facility: CLINIC | Age: 33
End: 2018-11-20

## 2018-11-20 DIAGNOSIS — E10.21 TYPE 1 DIABETES MELLITUS WITH DIABETIC NEPHROPATHY, WITH LONG-TERM CURRENT USE OF INSULIN (HCC): Chronic | ICD-10-CM

## 2018-11-21 NOTE — TELEPHONE ENCOUNTER
Per verbal response of Dr Dipti Hudson, pt does not need pre dental work antibiotics  Mom Kavitha Days advised and verbalized understanding

## 2018-11-27 ENCOUNTER — TELEPHONE (OUTPATIENT)
Dept: INTERNAL MEDICINE CLINIC | Facility: CLINIC | Age: 33
End: 2018-11-27

## 2018-11-27 NOTE — TELEPHONE ENCOUNTER
Patient needs to have two root canals done and they need a letter from you stating that he is a Type 1 Diabetic  Please put a note together for him and then call mom to come and pick it up so she can get this work scheduled

## 2018-11-27 NOTE — LETTER
To Whom It May Concern,    Please be advised that Genna Perez has been under my medical care  He has Type 1 diabetes      Thank you,        Sade Linder, [address] Internal Medicine

## 2018-12-05 ENCOUNTER — REMOTE DEVICE CLINIC VISIT (OUTPATIENT)
Dept: CARDIOLOGY CLINIC | Facility: CLINIC | Age: 33
End: 2018-12-05
Payer: COMMERCIAL

## 2018-12-05 DIAGNOSIS — Z86.73 PERSONAL HISTORY OF TRANSIENT ISCHEMIC ATTACK: Primary | ICD-10-CM

## 2018-12-05 DIAGNOSIS — Z95.818 PRESENCE OF OTHER CARDIAC IMPLANTS AND GRAFTS: ICD-10-CM

## 2018-12-05 PROCEDURE — 93299 PR REM INTERROG ICPMS/SCRMS <30 D TECH REVIEW: CPT | Performed by: INTERNAL MEDICINE

## 2018-12-05 PROCEDURE — 93298 REM INTERROG DEV EVAL SCRMS: CPT | Performed by: INTERNAL MEDICINE

## 2018-12-05 NOTE — PROGRESS NOTES
MDT LOOP  CARELINK TRANSMISSION: LOOP RECORDER  PRESENTING RHYTHM NSR @ 81 BPM  BATTERY STATUS "OK"  NO PATIENT OR DEVICE ACTIVATED EPISODES  NORMAL DEVICE FUNCTION   DL

## 2018-12-07 ENCOUNTER — OFFICE VISIT (OUTPATIENT)
Dept: URGENT CARE | Age: 33
End: 2018-12-07
Payer: COMMERCIAL

## 2018-12-07 VITALS
BODY MASS INDEX: 29.12 KG/M2 | SYSTOLIC BLOOD PRESSURE: 163 MMHG | RESPIRATION RATE: 18 BRPM | HEIGHT: 71 IN | TEMPERATURE: 98 F | DIASTOLIC BLOOD PRESSURE: 86 MMHG | OXYGEN SATURATION: 99 % | WEIGHT: 208 LBS | HEART RATE: 81 BPM

## 2018-12-07 DIAGNOSIS — J06.9 ACUTE UPPER RESPIRATORY INFECTION: ICD-10-CM

## 2018-12-07 DIAGNOSIS — H66.91 ACUTE RIGHT OTITIS MEDIA: Primary | ICD-10-CM

## 2018-12-07 PROCEDURE — 99283 EMERGENCY DEPT VISIT LOW MDM: CPT | Performed by: FAMILY MEDICINE

## 2018-12-07 PROCEDURE — G0382 LEV 3 HOSP TYPE B ED VISIT: HCPCS | Performed by: FAMILY MEDICINE

## 2018-12-07 RX ORDER — AMOXICILLIN 500 MG/1
500 CAPSULE ORAL EVERY 8 HOURS SCHEDULED
Qty: 30 CAPSULE | Refills: 0 | Status: SHIPPED | OUTPATIENT
Start: 2018-12-07 | End: 2018-12-17

## 2018-12-07 NOTE — PROGRESS NOTES
[facility]        NAME: Ariel Curtis is a 26 y o  male  : 1985    MRN: [id number]  DATE: 2018  TIME: 10:23 AM    Assessment and Plan   Acute right otitis media [H66 91]  1  Acute right otitis media  amoxicillin (AMOXIL) 500 mg capsule   2  Acute upper respiratory infection           Patient Instructions     Patient Instructions   Amoxicillin 3 times a day until finished (please take probiotics)  Tylenol, or ibuprofen (Advil/Motrin) as needed  Recheck/follow-up with family physician as needed  Please go to the hospital emergency department if needed  Follow up with PCP in 3-5 days  Proceed to  ER if symptoms worsen  Chief Complaint     Chief Complaint   Patient presents with    Earache     right ear pain x 2 days         History of Present Illness       Right ear pain, congestion        Review of Systems   Review of Systems   HENT: Positive for congestion and ear pain  Respiratory: Negative  Cardiovascular: Negative  Musculoskeletal: Negative  Skin: Negative  Neurological: Negative            Current Medications       Current Outpatient Prescriptions:     amoxicillin (AMOXIL) 500 mg capsule, Take 1 capsule (500 mg total) by mouth every 8 (eight) hours for 30 doses, Disp: 30 capsule, Rfl: 0    atorvastatin (LIPITOR) 40 mg tablet, Take 1 tablet (40 mg total) by mouth every evening, Disp: 90 tablet, Rfl: 1    B-D ULTRAFINE III SHORT PEN 31G X 8 MM MISC, Inject under the skin 4 (four) times a day, Disp: 400 each, Rfl: 3    calcium acetate (PHOSLO) 667 mg capsule, Take 1 capsule (667 mg total) by mouth 3 (three) times a day with meals, Disp: , Rfl: 0    Cholecalciferol (VITAMIN D) 2000 units CAPS, Take 1 capsule by mouth daily, Disp: , Rfl:     clopidogrel (PLAVIX) 75 mg tablet, Take 1 tablet (75 mg total) by mouth daily, Disp: 90 tablet, Rfl: 1    ergocalciferol (VITAMIN D2) 50,000 units, , Disp: , Rfl:     escitalopram (LEXAPRO) 10 mg tablet, Take 1 tablet (10 mg total) by mouth daily, Disp: 90 tablet, Rfl: 2    folic acid (FOLVITE) 1 mg tablet, Take 1 tablet (1 mg total) by mouth daily, Disp: 90 tablet, Rfl: 1    hydrALAZINE (APRESOLINE) 100 MG tablet, Take 0 5 tablets (50 mg total) by mouth 3 (three) times a day (Patient taking differently: Take by mouth 2 (two) times a day 50mg twice a day and 100mg once at bedtime ), Disp: , Rfl:     insulin glargine (BASAGLAR KWIKPEN) 100 units/mL injection pen, Inject 10u sc in AM and 12u in PM (Patient taking differently: Inject 10 Units under the skin 2 (two) times a day Inject 10u sc in AM and 12u in PM ), Disp: 5 pen, Rfl: 5    insulin lispro (ADMELOG SOLOSTAR) 100 units/mL injection pen, Inject 10 units with sliding scale of 1 unit for every 25 over 150, 3 times daily with meals, Disp: 10 pen, Rfl: 1    labetalol (NORMODYNE) 300 mg tablet, Take 0 5 tablets (150 mg total) by mouth every 8 (eight) hours (Patient taking differently: Take 300 mg by mouth 3 (three) times a day  ), Disp: 60 tablet, Rfl: 3    NIFEdipine ER (ADALAT CC) 60 MG 24 hr tablet, Take 1 tablet (60 mg total) by mouth daily (Patient taking differently: Take 60 mg by mouth 2 (two) times a day  ), Disp: 180 tablet, Rfl: 0    ondansetron (ZOFRAN) 4 mg tablet, Take 1 tablet (4 mg total) by mouth every 8 (eight) hours as needed for nausea or vomiting, Disp: 30 tablet, Rfl: 3    sodium bicarbonate 650 mg tablet, Take 1 tablet (650 mg total) by mouth 4 (four) times a day, Disp: 120 tablet, Rfl: 6    torsemide (DEMADEX) 20 mg tablet, Take 20 mg by mouth One tablets in the morning and two tablets at night , Disp: , Rfl:     Current Allergies     Allergies as of 12/07/2018 - Reviewed 12/07/2018   Allergen Reaction Noted    Sulfa antibiotics Rash 07/21/2015            The following portions of the patient's history were reviewed and updated as appropriate: allergies, current medications, past family history, past medical history, past social history, past surgical history and problem list      Past Medical History:   Diagnosis Date    [facility] kidney [facility] ([facility] [address] )     Cerebellar stroke side undetermined 2015 2015,1/2018    Diabetes type 1, controlled ([facility] [address] )     IDDM    History of shingles 2010    History of transfusion 02/2018    Hypertension     Muscle weakness     general unsteadiness       Past Surgical History:   Procedure Laterality Date    CARDIAC LOOP RECORDER  05/2018    PERITONEAL CATHETER INSERTION N/A 8/27/2018    Procedure: UNROOF PD CATHETER;  Surgeon: Jalil Quevedo DO;  [facility]: [facility];  Service: General    VA LAP INSERTION TUNNELED INTRAPERITONEAL CATHETER N/A 8/6/2018    Procedure: LAPAROSCOPIC PD CATHETER PLACEMENT;  Surgeon: Jalil Quevedo DO;  Location: [facility];  Service: General    TONSILLECTOMY         Family History   Problem Relation Age of Onset    Breast cancer Mother     Hypertension Mother     Hyperlipidemia Father     Hypertension Father     Leukemia Maternal Grandmother     Hyperlipidemia Maternal Grandfather     Hypertension Maternal Grandfather     Hyperlipidemia Paternal Grandmother     Hypertension Paternal Grandmother     Heart disease Paternal Grandfather         cardiac disorder    Diabetes Paternal Grandfather          Medications have been verified  Objective   /86   Pulse 81   Temp 98 °F (36 7 °C) (Temporal)   Resp 18   Ht 5' 11" (1 803 m)   Wt 94 3 kg (208 lb)   SpO2 99%   BMI 29 01 kg/m²        Physical Exam     Physical Exam   Constitutional: He is oriented to person, place, and time  He appears well-developed and well-nourished  HENT:   Left Ear: External ear normal    Erythema of the right eardrum; slight nasal congestion; slight injection of the oropharynx   Neck: Normal range of motion  Neck supple  Cardiovascular: Normal rate, regular rhythm and normal heart sounds      Pulmonary/Chest: Effort normal and breath sounds normal    Neurological: He is alert and oriented to person, place, and time  No nuchal rigidity   Skin: Skin is warm  Fair color and turgor   Psychiatric: He has a normal mood and affect  His behavior is normal    Nursing note and vitals reviewed

## 2018-12-07 NOTE — PATIENT INSTRUCTIONS
Amoxicillin 3 times a day until finished (please take probiotics)  Tylenol, or ibuprofen (Advil/Motrin) as needed  Recheck/follow-up with family physician as needed  Please go to the hospital emergency department if needed

## 2018-12-10 ENCOUNTER — LAB (OUTPATIENT)
Dept: LAB | Facility: CLINIC | Age: 33
End: 2018-12-10
Payer: COMMERCIAL

## 2018-12-10 DIAGNOSIS — I10 ESSENTIAL HYPERTENSION: ICD-10-CM

## 2018-12-10 DIAGNOSIS — E10.21 TYPE 1 DIABETES MELLITUS WITH DIABETIC NEPHROPATHY, WITH LONG-TERM CURRENT USE OF INSULIN (HCC): Chronic | ICD-10-CM

## 2018-12-10 LAB
ALBUMIN SERPL BCP-MCNC: 3.1 G/DL (ref 3.5–5)
ALP SERPL-CCNC: 75 U/L (ref 46–116)
ALT SERPL W P-5'-P-CCNC: 23 U/L (ref 12–78)
ANION GAP SERPL CALCULATED.3IONS-SCNC: 14 MMOL/L (ref 4–13)
AST SERPL W P-5'-P-CCNC: 12 U/L (ref 5–45)
BILIRUB SERPL-MCNC: 0.5 MG/DL (ref 0.2–1)
BUN SERPL-MCNC: 55 MG/DL (ref 5–25)
CALCIUM SERPL-MCNC: 9.1 MG/DL (ref 8.3–10.1)
CHLORIDE SERPL-SCNC: 105 MMOL/L (ref 100–108)
CO2 SERPL-SCNC: 24 MMOL/L (ref 21–32)
CREAT SERPL-MCNC: 7.64 MG/DL (ref 0.6–1.3)
EST. AVERAGE GLUCOSE BLD GHB EST-MCNC: 108 MG/DL
GFR SERPL CREATININE-BSD FRML MDRD: 8 ML/MIN/1.73SQ M
GLUCOSE SERPL-MCNC: 58 MG/DL (ref 65–140)
HBA1C MFR BLD: 5.4 % (ref 4.2–6.3)
POTASSIUM SERPL-SCNC: 3.9 MMOL/L (ref 3.5–5.3)
PROT SERPL-MCNC: 6 G/DL (ref 6.4–8.2)
SODIUM SERPL-SCNC: 143 MMOL/L (ref 136–145)

## 2018-12-10 PROCEDURE — 83036 HEMOGLOBIN GLYCOSYLATED A1C: CPT

## 2018-12-10 PROCEDURE — 36415 COLL VENOUS BLD VENIPUNCTURE: CPT

## 2018-12-10 PROCEDURE — 80053 COMPREHEN METABOLIC PANEL: CPT

## 2018-12-10 NOTE — PROGRESS NOTES
Patient Progress Note      CC: diabetes    Referring Provider  Johnathan Tolbert, Do  [address]  [address], [address] [place], [address]     History of Present Illness:   Devi Robles is a 26 y o  male with a history of type 1 diabetes with long term use of insulin since 30 years  Diabetes course has been stable  Complications of DM: CVA, ESRD on peritoneal dialysis  Denies recent illness or hospitalizations  Denies recent severe hypoglycemic or severe hyperglycemic episodes  Denies any issues with his current regimen  Home glucose monitoring: are performed regularly, at least 4 times a day  Just yesterday, he started using the [facility]  Currently glucose is 151 mg/dl  Home blood glucose readings:   Before breakfast: 165- mid 300s  Before lunch: 140-310 mg/dl  Before dinner: high 100s - 200s  Bedtime: 150-268 mg/dl    Current regimen: Basaglar 12 units in the AM and 12 units in PM, Admelog 7 units TID with meals + sliding scale of 1 unit for every 25 over 150 mg/dl  Compliant most of the time, denies any side effects from medications  Injects in: thighs, arms  Rotates sites: Yes  Hypoglycemic episodes: No, infrequent  H/o of hypoglycemia causing hospitalization or Intervention such as glucagon injection  or ambulance call :  No  Hypoglycemia symptoms: dizziness, jitteriness and weak  Treatment of hypoglycemia: glucose tablets, juice    Medic alert tag: recommended: Yes    Diabetes education: No  Diet:  3 meals per day, 1 snacks per day  Timing of meals is predictable     Diabetic diet compliance:  noncompliant much of the time  Activity: Daily activity is predictable: No, limited activity after stroke         Further diabetic ROS: no polyuria or polydipsia, no chest pain, dyspnea or TIAs, no numbness, tingling or pain in extremities        Ophthamology: sees at least yearly; no retinopathy per patient  Podiatry: yearly  Infuenza vaccine: up-to-date    Has hypertension: on torsemide, hydralazine, nifedipine, labetalol, compliant most of the time  Has hyperlipidemia: on atorvastatin- tolerating well, no myalgias  compliant most of the time, denies any side effects from medications  Thyroid disorders: No  History of pancreatitis: No    Vitamin D deficiency: is taking 50,000 units monthly and 2000 IU daily   Reports managed by neurologist     Patient Active Problem List   Diagnosis    Cerebrovascular accident (CVA) of left pontine structure ([facility] )    Hypertensive urgency    Type 1 diabetes mellitus with diabetic nephropathy, with long-term current use of insulin ([facility] )    History of lacunar cerebrovascular accident (CVA)    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,      Binocular visual disturbance    Vitamin D deficiency    Homozygous MTHFR mutation [id number] ([place] [id number] )    End-stage renal disease on peritoneal dialysis ([place] [address] )    Persistent proteinuria    Bilateral leg edema    Ataxia    Left atrial dilation    Renovascular hypertension    Anemia    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 ([place] [address] )    Peritoneal dialysis catheter in place ([place] [address] )    Dyslipidemia    Strabismus    Diarrhea      Past Medical History:   Diagnosis Date    Acute kidney injury ([place] [address] )     Cerebellar stroke side undetermined 2015 2015,1/2018    Diabetes type 1, controlled ([place] [address] )     IDDM    History of shingles 2010    History of transfusion 02/2018    Hypertension     Muscle weakness     general unsteadiness      Past Surgical History:   Procedure Laterality Date    CARDIAC LOOP RECORDER  05/2018    EYE SURGERY      PERITONEAL CATHETER INSERTION N/A 8/27/2018    Procedure: UNROOF PD CATHETER;  Surgeon: Krishna Quevedo DO;  [facility];  Service: General    MD LAP INSERTION TUNNELED INTRAPERITONEAL CATHETER N/A 8/6/2018    Procedure: LAPAROSCOPIC PD CATHETER PLACEMENT;  Surgeon: Krishna Quevedo DO;  [facility];  Service: General    TONSILLECTOMY        Family History   Problem Relation Age of Onset    Breast cancer Mother     Hypertension Mother     Hyperlipidemia Father     Hypertension Father     Leukemia Maternal Grandmother     Hyperlipidemia Maternal Grandfather     Hypertension Maternal Grandfather     Hyperlipidemia Paternal Grandmother     Hypertension Paternal Grandmother     Heart disease Paternal Grandfather         cardiac disorder    Diabetes Paternal Grandfather      Social History   Substance Use Topics    Smoking status: Former Smoker     Packs/day: 0 50     Years: 12 00     Types: Cigarettes    Smokeless tobacco: Never Used      Comment: quit 2/2018    Alcohol use Yes      Comment: rarely     Allergies   Allergen Reactions    Sulfa Antibiotics Rash         Current Outpatient Prescriptions:     amoxicillin (AMOXIL) 500 mg capsule, Take 1 capsule (500 mg total) by mouth every 8 (eight) hours for 30 doses, Disp: 30 capsule, Rfl: 0    atorvastatin (LIPITOR) 40 mg tablet, Take 1 tablet (40 mg total) by mouth every evening, Disp: 90 tablet, Rfl: 1    b complex vitamins capsule, Take 1 capsule by mouth daily, Disp: , Rfl:     B-D ULTRAFINE III SHORT PEN 31G X 8 MM MISC, Inject under the skin 4 (four) times a day, Disp: 400 each, Rfl: 3    calcium acetate (PHOSLO) 667 mg capsule, Take 1 capsule (667 mg total) by mouth 3 (three) times a day with meals, Disp: , Rfl: 0    Cholecalciferol (VITAMIN D) 2000 units CAPS, Take 1 capsule by mouth daily, Disp: , Rfl:     clopidogrel (PLAVIX) 75 mg tablet, Take 1 tablet (75 mg total) by mouth daily, Disp: 90 tablet, Rfl: 1    ergocalciferol (VITAMIN D2) 50,000 units, , Disp: , Rfl:     escitalopram (LEXAPRO) 10 mg tablet, Take 1 tablet (10 mg total) by mouth daily, Disp: 90 tablet, Rfl: 2    hydrALAZINE (APRESOLINE) 100 MG tablet, Take 0 5 tablets (50 mg total) by mouth 3 (three) times a day (Patient taking differently: Take by mouth 2 (two) times a day 50mg twice a day and 100mg once at bedtime ), Disp: , Rfl:     insulin glargine (BASAGLAR KWIKPEN) 100 units/mL injection pen, Inject 12u in AM and 13u in PM, Disp: 5 pen, Rfl: 0    insulin lispro (ADMELOG SOLOSTAR) 100 units/mL injection pen, Inject 10 units with sliding scale of 1 unit for every 25 over 150, 3 times daily with meals, Disp: 10 pen, Rfl: 1    labetalol (NORMODYNE) 300 mg tablet, Take 0 5 tablets (150 mg total) by mouth every 8 (eight) hours (Patient taking differently: Take 300 mg by mouth 3 (three) times a day  ), Disp: 60 tablet, Rfl: 3    ondansetron (ZOFRAN) 4 mg tablet, Take 1 tablet (4 mg total) by mouth every 8 (eight) hours as needed for nausea or vomiting, Disp: 30 tablet, Rfl: 3    sodium bicarbonate 650 mg tablet, Take 1 tablet (650 mg total) by mouth 4 (four) times a day (Patient taking differently: Take 650 mg by mouth 2 (two) times a day  ), Disp: 120 tablet, Rfl: 6    torsemide (DEMADEX) 20 mg tablet, Take 60 mg by mouth every morning One tablets in the morning and two tablets at night , Disp: , Rfl:     folic acid (FOLVITE) 1 mg tablet, Take 1 tablet (1 mg total) by mouth daily (Patient not taking: Reported on 12/11/2018 ), Disp: 90 tablet, Rfl: 1    NIFEdipine ER (ADALAT CC) 60 MG 24 hr tablet, Take 1 tablet (60 mg total) by mouth daily (Patient taking differently: Take 60 mg by mouth 2 (two) times a day  ), Disp: 180 tablet, Rfl: 0  Review of Systems   Constitutional: Positive for appetite change (improved) and fatigue  Negative for activity change and unexpected weight change  HENT: Negative for trouble swallowing  Eyes: Positive for visual disturbance (diplopia, does follow-up with neuro-ophthalmologist)  Respiratory: Negative for shortness of breath  Cardiovascular: Negative for chest pain and palpitations  Gastrointestinal: Negative for constipation and diarrhea  Endocrine: Negative for cold intolerance, heat intolerance, polydipsia and polyuria  Musculoskeletal: Negative  Skin: Negative for wound     Neurological: Negative for numbness  Psychiatric/Behavioral: Negative  Physical Exam:  Body mass index is 29 52 kg/m²  /92 (BP Location: Left arm, Patient Position: Sitting, Cuff Size: Adult)   Pulse 88   Temp 98 1 °F (36 7 °C) (Oral)   Ht 5' 11" (1 803 m)   Wt 96 kg (211 lb 10 3 oz)   BMI 29 52 kg/m²    Wt Readings from Last 3 Encounters:   12/11/18 96 kg (211 lb 10 3 oz)   12/07/18 94 3 kg (208 lb)   11/09/18 96 2 kg (212 lb)       Physical Exam   Constitutional: He appears well-developed and well-nourished  HENT:   Head: Normocephalic  Eyes: Pupils are equal, round, and reactive to light  EOM are normal  No scleral icterus  Sclera hyperemic b/l   Neck: Neck supple  No thyromegaly present  Cardiovascular: Normal rate and regular rhythm  No murmur heard  Pulses:       Radial pulses are 2+ on the right side, and 2+ on the left side  Pulmonary/Chest: Effort normal and breath sounds normal  No respiratory distress  He has no wheezes  Neurological: He is alert  Skin: Skin is warm and dry  Psychiatric: He has a normal mood and affect  Nursing note and vitals reviewed  Patient's shoes and socks were not removed  Labs:      Ref   Range 12/10/2018 09:14   Sodium Latest Ref Range: 136 - 145 mmol/L 143   Potassium Latest Ref Range: 3 5 - 5 3 mmol/L 3 9   Chloride Latest Ref Range: 100 - 108 mmol/L 105   CO2 Latest Ref Range: 21 - 32 mmol/L 24   Anion Gap Latest Ref Range: 4 - 13 mmol/L 14 (H)   BUN Latest Ref Range: 5 - 25 mg/dL 55 (H)   Creatinine Latest Ref Range: 0 60 - 1 30 mg/dL 7 64 (H)   Glucose, Random Latest Ref Range: 65 - 140 mg/dL 58 (L)   Calcium Latest Ref Range: 8 3 - 10 1 mg/dL 9 1   AST Latest Ref Range: 5 - 45 U/L 12   ALT Latest Ref Range: 12 - 78 U/L 23   Alkaline Phosphatase Latest Ref Range: 46 - 116 U/L 75   Total Protein Latest Ref Range: 6 4 - 8 2 g/dL 6 0 (L)   Albumin Latest Ref Range: 3 5 - 5 0 g/dL 3 1 (L)   TOTAL BILIRUBIN Latest Ref Range: 0 20 - 1 00 mg/dL 0 50   eGFR Latest Units: ml/min/1 73sq m 8      Ref  Range 9/8/2018 07:26   Cholesterol Latest Ref Range: 50 - 200 mg/dL 85   Triglycerides Latest Ref Range: <=150 mg/dL 65   HDL Latest Ref Range: 40 - 60 mg/dL 35 (L)   Non-HDL Cholesterol Latest Units: mg/dl 50   LDL Direct Latest Ref Range: 0 - 100 mg/dL 37      Ref  Range 3/6/2018 11:21 6/6/2018 09:24 9/8/2018 00:00   Hemoglobin A1C Unknown 6 2 5 3 4 9      Ref  Range 12/10/2018 09:14   Hemoglobin A1C Latest Ref Range: 4 2 - 6 3 % 5 4      Ref  Range 1/23/2018 04:50   TSH 3RD GENERATON Latest Ref Range: 0 358 - 3 740 uIU/mL 2 208       Plan:    Hien Umanzor was seen today for endo follow-up consult  Diagnoses and all orders for this visit:    Type 1 diabetes mellitus with diabetic nephropathy, with long-term current use of insulin ([address] )  HGA1C 5 4%  Worsened  A1C likely unreliable due to ESRD  Treatment regimen: Increase Basaglar at bedtime to 13 units as fasting glucose is elevated  No other changes at this time  Blood sugars are variable during the day, advised to keep carb intake consistent  Download DEXCOM in 2 weeks and call for us to review  Discussed intensive insulin regimen does increase risk for hypoglycemia  Episodes of hypoglycemia can lead to permanent disability and death  Discussed risks/complications associated with uncontrolled diabetes  Advised to adhere to diabetic diet, and recommended staying active/exercising routinely  Keep carbohydrates consistent to limit blood glucose fluctuations  Advised to call if blood sugars less than 70 mg/dl or over 300 mg/dl  Check blood glucose 4+ times a day  Discussed symptoms and treatment of hypoglycemia  Referred to diabetes/nutrition education and CGM training  Recommended routine follow-up with podiatry and ophthalmology  Ordered blood work to complete prior to next visit  -     Hemoglobin A1C; Future  -     Basic metabolic panel;  Future  -     insulin glargine (Aleyda Lava) 100 units/mL injection pen; Inject 12u in AM and 13u in PM  -     Ambulatory referral to Diabetic Education; Future  -     Ambulatory referral to medical nutrition therapy for diabetes; Future    Hypertension  Elevated today  Patient reports today's reading is much better than most days and blood pressure is being closely monitored by nephrology  Continue current treatment  Vitamin D deficiency  Continue current supplementation  Patient reports, vitamin D is managed by neurologist     Dyslipidemia  LDL 37, at goal  Continue statin therapy    End-stage renal disease on peritoneal dialysis [facility])  Managed by nephrology    Discussed with the patient diagnosis and treatment and all questions fully answered  He will call me if any problems arise  Counseled patient on diagnostic results, prognosis, risk and benefit of treatment options, instruction for management, importance of treatment compliance, risk factor reduction and impressions      Franca Alcaraz PA-C      [facility] ENDOCRINOLOGY

## 2018-12-11 ENCOUNTER — OFFICE VISIT (OUTPATIENT)
Dept: MULTI SPECIALTY CLINIC | Facility: CLINIC | Age: 33
End: 2018-12-11
Payer: COMMERCIAL

## 2018-12-11 VITALS
TEMPERATURE: 98.1 F | DIASTOLIC BLOOD PRESSURE: 92 MMHG | WEIGHT: 211.64 LBS | HEART RATE: 88 BPM | HEIGHT: 71 IN | BODY MASS INDEX: 29.63 KG/M2 | SYSTOLIC BLOOD PRESSURE: 162 MMHG

## 2018-12-11 DIAGNOSIS — N18.6 END-STAGE RENAL DISEASE ON PERITONEAL DIALYSIS (HCC): ICD-10-CM

## 2018-12-11 DIAGNOSIS — E55.9 VITAMIN D DEFICIENCY: Chronic | ICD-10-CM

## 2018-12-11 DIAGNOSIS — Z99.2 END-STAGE RENAL DISEASE ON PERITONEAL DIALYSIS (HCC): ICD-10-CM

## 2018-12-11 DIAGNOSIS — E10.21 TYPE 1 DIABETES MELLITUS WITH DIABETIC NEPHROPATHY, WITH LONG-TERM CURRENT USE OF INSULIN (HCC): Primary | Chronic | ICD-10-CM

## 2018-12-11 DIAGNOSIS — E78.5 DYSLIPIDEMIA: ICD-10-CM

## 2018-12-11 PROCEDURE — 99214 OFFICE O/P EST MOD 30 MIN: CPT | Performed by: INTERNAL MEDICINE

## 2018-12-11 RX ORDER — VITAMIN B COMPLEX
1 CAPSULE ORAL
COMMUNITY

## 2018-12-19 ENCOUNTER — TELEPHONE (OUTPATIENT)
Dept: ENDOCRINOLOGY | Facility: CLINIC | Age: 33
End: 2018-12-19

## 2018-12-19 ENCOUNTER — OFFICE VISIT (OUTPATIENT)
Dept: DIABETES SERVICES | Facility: CLINIC | Age: 33
End: 2018-12-19
Payer: COMMERCIAL

## 2018-12-19 ENCOUNTER — TELEPHONE (OUTPATIENT)
Dept: DIABETES SERVICES | Facility: CLINIC | Age: 33
End: 2018-12-19

## 2018-12-19 DIAGNOSIS — E10.21 TYPE 1 DIABETES MELLITUS WITH DIABETIC NEPHROPATHY, WITH LONG-TERM CURRENT USE OF INSULIN (HCC): Chronic | ICD-10-CM

## 2018-12-19 PROCEDURE — 95249 CONT GLUC MNTR PT PROV EQP: CPT

## 2018-12-19 NOTE — TELEPHONE ENCOUNTER
Only 1 week of data for review, because patient just started using DEXCOM  Average glucose 170 with standard deviation of 56  Morning glucose tends to run a little high, but over the past 2 days (18th and 19th) blood sugars have been better controlled  Try to keep carbohydrate intake consistent to avoid fluctuations in blood glucose  For now continue current regimen and send download in 1-2 weeks for review  Call if blood glucose persistently less than 70 or over 300       Franca Alcaraz PA-C

## 2018-12-19 NOTE — TELEPHONE ENCOUNTER
I saw him for his DexCom today  He inserted himself a week ago   I did a download and am having it sent to your office for review  I'm not sure what the process is for our clinic patients using sensor

## 2018-12-19 NOTE — PATIENT INSTRUCTIONS
Change sensor every 10 days  Confirm sensor reading with finger reading if no trend arrows or numbers prior to bolusing insulin  Download clarity to home computer and save as a PDF file

## 2018-12-19 NOTE — PROGRESS NOTES
Personal CGM Initiation    Met with Yissel Laboy, 26  -yr old patient, accompanied by his father  He has inserted and started the sensor 9dyas ago  He did not bring in a new sensor for today  The patient/family brings all necessary equipment, fully charged?:NO    The patient/family was assisted with set-up of the device  High alert: 230 Snooze time:120 minutes  Low alert: 80 Snooze time: 20 minutes  Other alert:low alert soon   Alerts set? : YES    Topics discussed included:   difference between sensor and meter glucose   when fingerstick confirmation is advisable   system set-up   sensor insertion and start    no calibration procedure required and schedule   reading graphs and trend information   troubleshooting alarms    activities of daily living: shower, swimming, travel, CT/MRI  downloading appropriate carrol(s) for downloading/sharing  sensor removal, disposal    I have asked him to download the clarity carrol to his home computer and save the download as a PDF  He will then be able to send to his practioner

## 2019-01-04 DIAGNOSIS — H51.0 BINOCULAR VISION DISORDER WITH CONJUGATE GAZE PALSY: ICD-10-CM

## 2019-01-04 DIAGNOSIS — N18.30 ACUTE RENAL FAILURE WITH ACUTE TUBULAR NECROSIS SUPERIMPOSED ON STAGE 3 CHRONIC KIDNEY DISEASE (HCC): ICD-10-CM

## 2019-01-04 DIAGNOSIS — Z86.73 HISTORY OF LACUNAR CEREBROVASCULAR ACCIDENT (CVA): ICD-10-CM

## 2019-01-04 DIAGNOSIS — N18.2 ACUTE RENAL FAILURE WITH ACUTE TUBULAR NECROSIS SUPERIMPOSED ON STAGE 2 CHRONIC KIDNEY DISEASE (HCC): ICD-10-CM

## 2019-01-04 DIAGNOSIS — I16.0 HYPERTENSIVE URGENCY: ICD-10-CM

## 2019-01-04 DIAGNOSIS — E72.11 HYPERHOMOCYSTEINEMIA (HCC): ICD-10-CM

## 2019-01-04 DIAGNOSIS — N17.0 ACUTE RENAL FAILURE WITH ACUTE TUBULAR NECROSIS SUPERIMPOSED ON STAGE 3 CHRONIC KIDNEY DISEASE (HCC): ICD-10-CM

## 2019-01-04 DIAGNOSIS — I63.9 CEREBROVASCULAR ACCIDENT (CVA), UNSPECIFIED MECHANISM (HCC): ICD-10-CM

## 2019-01-04 DIAGNOSIS — E10.21 TYPE 1 DIABETES MELLITUS WITH DIABETIC NEPHROPATHY, WITH LONG-TERM CURRENT USE OF INSULIN (HCC): Chronic | ICD-10-CM

## 2019-01-04 DIAGNOSIS — N17.0 ACUTE RENAL FAILURE WITH ACUTE TUBULAR NECROSIS SUPERIMPOSED ON STAGE 2 CHRONIC KIDNEY DISEASE (HCC): ICD-10-CM

## 2019-01-04 DIAGNOSIS — R11.0 NAUSEA: ICD-10-CM

## 2019-01-04 DIAGNOSIS — E83.39 HYPERPHOSPHATEMIA: ICD-10-CM

## 2019-01-04 DIAGNOSIS — I63.9 CEREBROVASCULAR ACCIDENT (CVA) OF LEFT PONTINE STRUCTURE (HCC): ICD-10-CM

## 2019-01-04 DIAGNOSIS — H46.9 OPTIC NEURITIS DUE TO MULTIPLE SCLEROSIS (HCC): ICD-10-CM

## 2019-01-04 DIAGNOSIS — R79.89 AZOTEMIA: ICD-10-CM

## 2019-01-04 DIAGNOSIS — H53.8 BLURRED VISION: ICD-10-CM

## 2019-01-04 DIAGNOSIS — R80.1 PERSISTENT PROTEINURIA: ICD-10-CM

## 2019-01-04 DIAGNOSIS — H53.30 BINOCULAR VISUAL DISTURBANCE: ICD-10-CM

## 2019-01-04 DIAGNOSIS — G35 OPTIC NEURITIS DUE TO MULTIPLE SCLEROSIS (HCC): ICD-10-CM

## 2019-01-04 DIAGNOSIS — E55.9 VITAMIN D DEFICIENCY: ICD-10-CM

## 2019-01-04 RX ORDER — ATORVASTATIN CALCIUM 40 MG/1
40 TABLET, FILM COATED ORAL EVERY EVENING
Qty: 90 TABLET | Refills: 1 | Status: SHIPPED | OUTPATIENT
Start: 2019-01-04 | End: 2019-01-28 | Stop reason: SDUPTHER

## 2019-01-23 ENCOUNTER — TELEPHONE (OUTPATIENT)
Dept: INTERNAL MEDICINE CLINIC | Facility: CLINIC | Age: 34
End: 2019-01-23

## 2019-01-23 NOTE — LETTER
To Whom It May Concern,     French Elizondo has been under my medical care  Dental clearance is required prior to placement on transplant list for kidney and pancreas  His medical conditions include diabetes type 1 with nephropathy, end-stage renal disease on peritoneal dialysis with history of strokes      Thank you,        Philipp Nobles, [id number] Internal Medicine

## 2019-01-23 NOTE — TELEPHONE ENCOUNTER
Chitra Rockwell mother [facility] Raya called and asked if you would write a letter stating pts medical issues for dental clearance for patient so that he may be put on a list as a candidate for transplant(KIdney and pancreas)  the letter must state that he needs dental clearance for transplant list You did write a letter stating earlier that  pt has type 1 diabetes,but that was not enough for insurance purposes  The letter can be faxed at [address] [employer])  [facility] Raya can be reached at [phone]

## 2019-01-28 DIAGNOSIS — N18.30 ACUTE RENAL FAILURE WITH ACUTE TUBULAR NECROSIS SUPERIMPOSED ON STAGE 3 CHRONIC KIDNEY DISEASE (HCC): ICD-10-CM

## 2019-01-28 DIAGNOSIS — E72.11 HYPERHOMOCYSTEINEMIA (HCC): ICD-10-CM

## 2019-01-28 DIAGNOSIS — H51.0 BINOCULAR VISION DISORDER WITH CONJUGATE GAZE PALSY: ICD-10-CM

## 2019-01-28 DIAGNOSIS — I63.9 CEREBROVASCULAR ACCIDENT (CVA), UNSPECIFIED MECHANISM (HCC): ICD-10-CM

## 2019-01-28 DIAGNOSIS — E55.9 VITAMIN D DEFICIENCY: ICD-10-CM

## 2019-01-28 DIAGNOSIS — R11.0 NAUSEA: ICD-10-CM

## 2019-01-28 DIAGNOSIS — R80.1 PERSISTENT PROTEINURIA: ICD-10-CM

## 2019-01-28 DIAGNOSIS — H53.30 BINOCULAR VISUAL DISTURBANCE: ICD-10-CM

## 2019-01-28 DIAGNOSIS — Z86.73 HISTORY OF LACUNAR CEREBROVASCULAR ACCIDENT (CVA): ICD-10-CM

## 2019-01-28 DIAGNOSIS — R79.89 AZOTEMIA: ICD-10-CM

## 2019-01-28 DIAGNOSIS — N17.0 ACUTE RENAL FAILURE WITH ACUTE TUBULAR NECROSIS SUPERIMPOSED ON STAGE 2 CHRONIC KIDNEY DISEASE (HCC): ICD-10-CM

## 2019-01-28 DIAGNOSIS — G35 OPTIC NEURITIS DUE TO MULTIPLE SCLEROSIS (HCC): ICD-10-CM

## 2019-01-28 DIAGNOSIS — N17.0 ACUTE RENAL FAILURE WITH ACUTE TUBULAR NECROSIS SUPERIMPOSED ON STAGE 3 CHRONIC KIDNEY DISEASE (HCC): ICD-10-CM

## 2019-01-28 DIAGNOSIS — N18.2 ACUTE RENAL FAILURE WITH ACUTE TUBULAR NECROSIS SUPERIMPOSED ON STAGE 2 CHRONIC KIDNEY DISEASE (HCC): ICD-10-CM

## 2019-01-28 DIAGNOSIS — I63.9 CEREBROVASCULAR ACCIDENT (CVA) OF LEFT PONTINE STRUCTURE (HCC): ICD-10-CM

## 2019-01-28 DIAGNOSIS — H53.8 BLURRED VISION: ICD-10-CM

## 2019-01-28 DIAGNOSIS — E10.21 TYPE 1 DIABETES MELLITUS WITH DIABETIC NEPHROPATHY, WITH LONG-TERM CURRENT USE OF INSULIN (HCC): Chronic | ICD-10-CM

## 2019-01-28 DIAGNOSIS — E83.39 HYPERPHOSPHATEMIA: ICD-10-CM

## 2019-01-28 DIAGNOSIS — I16.0 HYPERTENSIVE URGENCY: ICD-10-CM

## 2019-01-28 DIAGNOSIS — H46.9 OPTIC NEURITIS DUE TO MULTIPLE SCLEROSIS (HCC): ICD-10-CM

## 2019-01-28 RX ORDER — ATORVASTATIN CALCIUM 40 MG/1
40 TABLET, FILM COATED ORAL EVERY EVENING
Qty: 90 TABLET | Refills: 1 | Status: SHIPPED | OUTPATIENT
Start: 2019-01-28 | End: 2019-08-05 | Stop reason: SDUPTHER

## 2019-02-05 ENCOUNTER — APPOINTMENT (OUTPATIENT)
Dept: LAB | Facility: CLINIC | Age: 34
End: 2019-02-05
Payer: COMMERCIAL

## 2019-02-05 ENCOUNTER — TRANSCRIBE ORDERS (OUTPATIENT)
Dept: LAB | Facility: CLINIC | Age: 34
End: 2019-02-05

## 2019-02-05 DIAGNOSIS — R19.7 DIARRHEA, UNSPECIFIED TYPE: ICD-10-CM

## 2019-02-05 PROCEDURE — 87493 C DIFF AMPLIFIED PROBE: CPT

## 2019-02-06 ENCOUNTER — TELEPHONE (OUTPATIENT)
Dept: INTERNAL MEDICINE CLINIC | Facility: CLINIC | Age: 34
End: 2019-02-06

## 2019-02-06 LAB — C DIFF TOX GENS STL QL NAA+PROBE: NORMAL

## 2019-02-06 NOTE — TELEPHONE ENCOUNTER
Would like to know the results of the stool test they took in yesterday  Results are listed  Please call

## 2019-02-19 DIAGNOSIS — Z86.73 HISTORY OF LACUNAR CEREBROVASCULAR ACCIDENT (CVA): ICD-10-CM

## 2019-02-19 DIAGNOSIS — E10.21 TYPE 1 DIABETES MELLITUS WITH DIABETIC NEPHROPATHY, WITH LONG-TERM CURRENT USE OF INSULIN (HCC): Chronic | ICD-10-CM

## 2019-02-19 DIAGNOSIS — H53.8 BLURRED VISION: ICD-10-CM

## 2019-02-19 DIAGNOSIS — E83.39 HYPERPHOSPHATEMIA: ICD-10-CM

## 2019-02-19 DIAGNOSIS — N18.30 ACUTE RENAL FAILURE WITH ACUTE TUBULAR NECROSIS SUPERIMPOSED ON STAGE 3 CHRONIC KIDNEY DISEASE (HCC): ICD-10-CM

## 2019-02-19 DIAGNOSIS — N17.0 ACUTE RENAL FAILURE WITH ACUTE TUBULAR NECROSIS SUPERIMPOSED ON STAGE 2 CHRONIC KIDNEY DISEASE (HCC): ICD-10-CM

## 2019-02-19 DIAGNOSIS — H53.30 BINOCULAR VISUAL DISTURBANCE: ICD-10-CM

## 2019-02-19 DIAGNOSIS — R11.0 NAUSEA: ICD-10-CM

## 2019-02-19 DIAGNOSIS — R80.1 PERSISTENT PROTEINURIA: ICD-10-CM

## 2019-02-19 DIAGNOSIS — G35 OPTIC NEURITIS DUE TO MULTIPLE SCLEROSIS (HCC): ICD-10-CM

## 2019-02-19 DIAGNOSIS — N17.0 ACUTE RENAL FAILURE WITH ACUTE TUBULAR NECROSIS SUPERIMPOSED ON STAGE 3 CHRONIC KIDNEY DISEASE (HCC): ICD-10-CM

## 2019-02-19 DIAGNOSIS — R79.89 AZOTEMIA: ICD-10-CM

## 2019-02-19 DIAGNOSIS — N18.2 ACUTE RENAL FAILURE WITH ACUTE TUBULAR NECROSIS SUPERIMPOSED ON STAGE 2 CHRONIC KIDNEY DISEASE (HCC): ICD-10-CM

## 2019-02-19 DIAGNOSIS — E55.9 VITAMIN D DEFICIENCY: ICD-10-CM

## 2019-02-19 DIAGNOSIS — I16.0 HYPERTENSIVE URGENCY: ICD-10-CM

## 2019-02-19 DIAGNOSIS — I63.9 CEREBROVASCULAR ACCIDENT (CVA), UNSPECIFIED MECHANISM (HCC): ICD-10-CM

## 2019-02-19 DIAGNOSIS — I63.9 CEREBROVASCULAR ACCIDENT (CVA) OF LEFT PONTINE STRUCTURE (HCC): ICD-10-CM

## 2019-02-19 DIAGNOSIS — H51.0 BINOCULAR VISION DISORDER WITH CONJUGATE GAZE PALSY: ICD-10-CM

## 2019-02-19 DIAGNOSIS — E72.11 HYPERHOMOCYSTEINEMIA (HCC): ICD-10-CM

## 2019-02-19 DIAGNOSIS — H46.9 OPTIC NEURITIS DUE TO MULTIPLE SCLEROSIS (HCC): ICD-10-CM

## 2019-02-19 RX ORDER — CLOPIDOGREL BISULFATE 75 MG/1
75 TABLET ORAL DAILY
Qty: 90 TABLET | Refills: 3 | Status: SHIPPED | OUTPATIENT
Start: 2019-02-19 | End: 2019-05-08

## 2019-03-05 ENCOUNTER — OFFICE VISIT (OUTPATIENT)
Dept: CARDIOLOGY CLINIC | Facility: CLINIC | Age: 34
End: 2019-03-05
Payer: COMMERCIAL

## 2019-03-05 VITALS
BODY MASS INDEX: 29.54 KG/M2 | HEART RATE: 72 BPM | DIASTOLIC BLOOD PRESSURE: 82 MMHG | SYSTOLIC BLOOD PRESSURE: 200 MMHG | HEIGHT: 71 IN | WEIGHT: 211 LBS

## 2019-03-05 DIAGNOSIS — Z01.810 PREOP CARDIOVASCULAR EXAM: Primary | ICD-10-CM

## 2019-03-05 DIAGNOSIS — I15.0 RENOVASCULAR HYPERTENSION: ICD-10-CM

## 2019-03-05 DIAGNOSIS — Z86.73 HISTORY OF LACUNAR CEREBROVASCULAR ACCIDENT (CVA): Chronic | ICD-10-CM

## 2019-03-05 PROCEDURE — 99214 OFFICE O/P EST MOD 30 MIN: CPT | Performed by: INTERNAL MEDICINE

## 2019-03-05 RX ORDER — PHENOL 1.4 %
3 AEROSOL, SPRAY (ML) MUCOUS MEMBRANE
COMMUNITY
End: 2020-01-02

## 2019-03-05 RX ORDER — GENTAMICIN SULFATE 1 MG/G
1 CREAM TOPICAL DAILY
COMMUNITY
Start: 2018-11-27 | End: 2020-05-26

## 2019-03-05 RX ORDER — CINACALCET 30 MG/1
60 TABLET, FILM COATED ORAL
COMMUNITY
End: 2019-09-06

## 2019-03-05 NOTE — PROGRESS NOTES
Cardiology Follow Up    Brain Whyte  1985  [id number]  [address] [facility] [place] [place] [id number] [phone]    1  Preop cardiovascular exam     2  History of lacunar cerebrovascular accident (CVA)     3  Renovascular hypertension           Discussion/Summary: His BP is elevated today  This is being managed by her nephrologist   He is otherwise active without anginal symptoms  Recent cardiac testing including an nuclear stress test and a SANJEEV were unremarkable  I feel that his cardiac risk for transplant surgery is acceptable and he is cleared without further cardiac testing needed  Interval History: He is seen for cardiac evaluation prior to being listed for kidney / pancreas transplant  He has HTN and is now on PD  He has DM, hypercholesterolemia  He did have 2 previous CVAs  SANJEEV showed no structural heart disease, no thrombus  He has an implantable loop recorder in which has not shown any Atrial Fibrillation  Nuclear stress test 2/15/2019 - EF 60%, No ischemia  ECG 2/4/2019 - NSR, non specific ST and T wave abnl  QT prolongation  He does some walking short distances  He denies CP, chest pressure, SOB          Patient Active Problem List   Diagnosis    Cerebrovascular accident (CVA) of left pontine structure ([facility]    Type 1 diabetes mellitus with diabetic nephropathy, with long-term current use of insulin ([facility] )    History of lacunar cerebrovascular accident (CVA)    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)    Dyslipidemia    Strabismus    Diarrhea    Preop cardiovascular exam     Past Medical History:   Diagnosis Date    [facility]     Cerebellar stroke side undetermined 2015 2015,1/2018    Diabetes type 1, controlled ([facility] )     IDDM    History of shingles 2010    History of transfusion 02/2018    Hypertension     Muscle weakness     general unsteadiness     Social History     Socioeconomic History    Marital status: Single     Spouse name: Not on file    Number of children: Not on file    Years of education: Not on file    Highest education level: Not on file   Occupational History    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   Social Needs    Financial resource strain: Not on file    Food insecurity:     Worry: Not on file     Inability: Not on file    Transportation needs:     Medical: Not on file     Non-medical: Not on file   Tobacco Use    Smoking status: Former Smoker     Packs/day: 0 50     Years: 12 00     Pack years: 6 00     Types: Cigarettes    Smokeless tobacco: Never Used    Tobacco comment: quit 2/2018   Substance and Sexual Activity    Alcohol use: Yes     Comment: rarely    Drug use: No     Comment: weekly-none since 11/2017    Sexual activity: Not on file   Lifestyle    Physical activity:     Days per week: Not on file     Minutes per session: Not on file    Stress: Not on file   Relationships    Social connections:     Talks on phone: Not on file     Gets together: Not on file     Attends Mu-ism service: Not on file     Active member of club or organization: Not on file     Attends meetings of clubs or organizations: Not on file     Relationship status: Not on file    Intimate partner violence:     Fear of current or ex partner: Not on file     Emotionally abused: Not on file     Physically abused: Not on file     Forced sexual activity: Not on file   Other Topics Concern    Not on file   Social History Narrative Caffeine use    single      Family History   Problem Relation Age of Onset   Brennan Breast cancer Mother     Hypertension Mother     Hyperlipidemia Father     Hypertension Father     Leukemia Maternal Grandmother     Hyperlipidemia Maternal Grandfather     Hypertension Maternal Grandfather     Hyperlipidemia Paternal Grandmother     Hypertension Paternal Grandmother     Heart disease Paternal Grandfather         cardiac disorder    Diabetes Paternal Grandfather      Past Surgical History:   Procedure Laterality Date    CARDIAC LOOP RECORDER  05/2018    EYE SURGERY      PERITONEAL CATHETER INSERTION N/A 8/27/2018    Procedure: UNROOF PD CATHETER;  Surgeon: Zechariah Burciaga DO;  [facility];  Service: General    NM LAP INSERTION TUNNELED INTRAPERITONEAL CATHETER N/A 8/6/2018    Procedure: LAPAROSCOPIC PD CATHETER PLACEMENT;  Surgeon: Zechariah Burciaga DO;  [facility];  Service: General    TONSILLECTOMY         Current Outpatient Medications:     atorvastatin (LIPITOR) 40 mg tablet, Take 1 tablet (40 mg total) by mouth every evening, Disp: 90 tablet, Rfl: 1    b complex vitamins capsule, Take 1 capsule by mouth daily, Disp: , Rfl:     calcium acetate (PHOSLO) 667 mg capsule, Take 1 capsule (667 mg total) by mouth 3 (three) times a day with meals, Disp: , Rfl: 0    Cholecalciferol (VITAMIN D) 2000 units CAPS, Take 1 capsule by mouth daily, Disp: , Rfl:     clopidogrel (PLAVIX) 75 mg tablet, Take 1 tablet (75 mg total) by mouth daily, Disp: 90 tablet, Rfl: 3    ergocalciferol (VITAMIN D2) 50,000 units, Take 50,000 Units by mouth every 28 days , Disp: , Rfl:     escitalopram (LEXAPRO) 10 mg tablet, Take 1 tablet (10 mg total) by mouth daily, Disp: 90 tablet, Rfl: 2    gentamicin (GARAMYCIN) 0 1 % cream, , Disp: , Rfl:     hydrALAZINE (APRESOLINE) 100 MG tablet, Take 0 5 tablets (50 mg total) by mouth 3 (three) times a day (Patient taking differently: Take 100 mg by mouth 100 mg twice daily), Disp: , Rfl:     insulin glargine (BASAGLAR KWIKPEN) 100 units/mL injection pen, Inject 12u in AM and 13u in PM, Disp: 5 pen, Rfl: 0    insulin lispro (ADMELOG SOLOSTAR) 100 units/mL injection pen, Inject 10 units with sliding scale of 1 unit for every 25 over 150, 3 times daily with meals, Disp: 10 pen, Rfl: 1    labetalol (NORMODYNE) 300 mg tablet, Take 0 5 tablets (150 mg total) by mouth every 8 (eight) hours (Patient taking differently: Take 300 mg by mouth 3 (three) times a day  ), Disp: 60 tablet, Rfl: 3    melatonin 3 mg, Take 3 mg by mouth, Disp: , Rfl:     NIFEdipine ER (ADALAT CC) 60 MG 24 hr tablet, Take 1 tablet (60 mg total) by mouth daily (Patient taking differently: Take 60 mg by mouth 2 (two) times a day  ), Disp: 180 tablet, Rfl: 0    ondansetron (ZOFRAN) 4 mg tablet, Take 1 tablet (4 mg total) by mouth every 8 (eight) hours as needed for nausea or vomiting, Disp: 30 tablet, Rfl: 3    torsemide (DEMADEX) 20 mg tablet, Take 60 mg by mouth every morning 3 tablets for total of 60 mg in AM & 2 tablets for 40 mg in afternoon, Disp: , Rfl:     B-D ULTRAFINE III SHORT PEN 31G X 8 MM MISC, Inject under the skin 4 (four) times a day, Disp: 400 each, Rfl: 3    cinacalcet (SENSIPAR) 30 mg tablet, Take 30 mg by mouth, Disp: , Rfl:   Allergies   Allergen Reactions    Sulfa Antibiotics Rash     Vitals:    03/05/19 1308   BP: (!) 200/82   BP Location: Left arm   Patient Position: Sitting   Cuff Size: Standard   Pulse: 72   Weight: 95 7 kg (211 lb)   Height: 5' 11" (1 803 m)     Weight (last 2 days)     Date/Time   Weight    03/05/19 1308   95 7 (211)             Blood pressure (!) 200/82, pulse 72, height 5' 11" (1 803 m), weight 95 7 kg (211 lb)  , Body mass index is 29 43 kg/m²  Labs:  Appointment on 02/05/2019   Component Date Value    C difficile toxin by PCR 02/05/2019 NEGATIVE for C difficle toxin by PCR       Lab on 12/10/2018   Component Date Value    Sodium 12/10/2018 143     Potassium 12/10/2018 3 9     Chloride 12/10/2018 105     CO2 12/10/2018 24     ANION GAP 12/10/2018 14*    BUN 12/10/2018 55*    Creatinine 12/10/2018 7 64*    Glucose 12/10/2018 58*    Calcium 12/10/2018 9 1     AST 12/10/2018 12     ALT 12/10/2018 23     Alkaline Phosphatase 12/10/2018 75     Total Protein 12/10/2018 6 0*    Albumin 12/10/2018 3 1*    Total Bilirubin 12/10/2018 0 50     eGFR 12/10/2018 8     Hemoglobin A1C 12/10/2018 5 4     EAG 12/10/2018 108    Admission on 09/16/2018, Discharged on 09/17/2018   Component Date Value    WBC 09/16/2018 8 68     RBC 09/16/2018 3 47*    Hemoglobin 09/16/2018 10 7*    Hematocrit 09/16/2018 31 2*    MCV 09/16/2018 90     MCH 09/16/2018 30 8     MCHC 09/16/2018 34 3     RDW 09/16/2018 14 2     MPV 09/16/2018 10 2     Platelets 09/31/3757 384     nRBC 09/16/2018 0     Neutrophils Relative 09/16/2018 68     Immat GRANS % 09/16/2018 0     Lymphocytes Relative 09/16/2018 20     Monocytes Relative 09/16/2018 8     Eosinophils Relative 09/16/2018 3     Basophils Relative 09/16/2018 1     Neutrophils Absolute 09/16/2018 5 94     Immature Grans Absolute 09/16/2018 0 01     Lymphocytes Absolute 09/16/2018 1 72     Monocytes Absolute 09/16/2018 0 65     Eosinophils Absolute 09/16/2018 0 29     Basophils Absolute 09/16/2018 0 07     Sodium 09/16/2018 138     Potassium 09/16/2018 3 4*    Chloride 09/16/2018 96*    CO2 09/16/2018 32     ANION GAP 09/16/2018 10     BUN 09/16/2018 48*    Creatinine 09/16/2018 11 74*    Glucose 09/16/2018 91     Calcium 09/16/2018 9 1     AST 09/16/2018 17     ALT 09/16/2018 21     Alkaline Phosphatase 09/16/2018 79     Total Protein 09/16/2018 7 0     Albumin 09/16/2018 3 7     Total Bilirubin 09/16/2018 0 40     eGFR 09/16/2018 5     LACTIC ACID 09/16/2018 1 0     Protime 09/16/2018 13 4     INR 09/16/2018 1 05     Magnesium 09/16/2018 2 4     Lipase 09/16/2018 110     Color, UA 09/16/2018 Yellow     Clarity, UA 09/16/2018 Clear     pH, UA 09/16/2018 6 5     Leukocytes, UA 09/16/2018 Negative     Nitrite, UA 09/16/2018 Negative     Protein, UA 09/16/2018 >=300*    Glucose, UA 09/16/2018 100 (1/10%)*    Ketones, UA 09/16/2018 Negative     Urobilinogen, UA 09/16/2018 0 2     Bilirubin, UA 09/16/2018 Negative     Blood, UA 09/16/2018 Negative     Specific Gravity, UA 09/16/2018 1 025     RBC, UA 09/16/2018 None Seen     WBC, UA 09/16/2018 0-5     Epithelial Cells 09/16/2018 Occasional     Bacteria, UA 09/16/2018 Occasional     Hyaline Casts, UA 09/16/2018 1-2*    POC Glucose 09/16/2018 191*    POC Glucose 09/16/2018 197*    Sodium 09/17/2018 142     Potassium 09/17/2018 3 5     Chloride 09/17/2018 98*    CO2 09/17/2018 28     ANION GAP 09/17/2018 16*    BUN 09/17/2018 48*    Creatinine 09/17/2018 11 53*    Glucose 09/17/2018 117     Glucose, Fasting 09/17/2018 117*    Calcium 09/17/2018 9 0     eGFR 09/17/2018 5     WBC 09/17/2018 7 47     RBC 09/17/2018 2 92*    Hemoglobin 09/17/2018 8 8*    Hematocrit 09/17/2018 26 5*    MCV 09/17/2018 91     MCH 09/17/2018 30 1     MCHC 09/17/2018 33 2     RDW 09/17/2018 14 0     MPV 09/17/2018 10 1     Platelets 59/48/2181 305     nRBC 09/17/2018 0     Neutrophils Relative 09/17/2018 59     Immat GRANS % 09/17/2018 0     Lymphocytes Relative 09/17/2018 28     Monocytes Relative 09/17/2018 8     Eosinophils Relative 09/17/2018 4     Basophils Relative 09/17/2018 1     Neutrophils Absolute 09/17/2018 4 39     Immature Grans Absolute 09/17/2018 0 02     Lymphocytes Absolute 09/17/2018 2 09     Monocytes Absolute 09/17/2018 0 62     Eosinophils Absolute 09/17/2018 0 28     Basophils Absolute 09/17/2018 0 07     POC Glucose 09/17/2018 157*    POC Glucose 09/17/2018 288*   Orders Only on 09/10/2018   Component Date Value    Hemoglobin A1C 09/08/2018 4 9    Lab on 09/08/2018   Component Date Value    Hemoglobin A1C 09/08/2018 4 9     EAG 09/08/2018 94     Cholesterol 09/08/2018 85     Triglycerides 09/08/2018 65     HDL, Direct 09/08/2018 35*    LDL Calculated 09/08/2018 37     Non-HDL-Chol (CHOL-HDL) 09/08/2018 50      Imaging: No results found  Review of Systems:  Review of Systems   Constitutional: Negative for diaphoresis, fatigue, fever and unexpected weight change  HENT: Negative  Respiratory: Negative for cough, shortness of breath and wheezing  Cardiovascular: Negative for chest pain, palpitations and leg swelling  Gastrointestinal: Negative for abdominal pain, diarrhea and nausea  Musculoskeletal: Negative for gait problem and myalgias  Skin: Negative for rash  Neurological: Negative for dizziness and numbness  Psychiatric/Behavioral: Negative  Physical Exam:  Physical Exam   Constitutional: He is oriented to person, place, and time  He appears well-developed and well-nourished  HENT:   Head: Normocephalic and atraumatic  Eyes: Pupils are equal, round, and reactive to light  Neck: Normal range of motion  Neck supple  No JVD present  Cardiovascular: Regular rhythm, S1 normal, S2 normal and normal pulses  Pulses:       Carotid pulses are 2+ on the right side, and 2+ on the left side  Pulmonary/Chest: Effort normal and breath sounds normal  He has no wheezes  He has no rales  Abdominal: Soft  Bowel sounds are normal  There is no tenderness  Musculoskeletal: Normal range of motion  He exhibits no edema or tenderness  Neurological: He is alert and oriented to person, place, and time  He has normal reflexes  No cranial nerve deficit  Skin: Skin is warm  Psychiatric: He has a normal mood and affect

## 2019-03-06 ENCOUNTER — OFFICE VISIT (OUTPATIENT)
Dept: URGENT CARE | Age: 34
End: 2019-03-06
Payer: COMMERCIAL

## 2019-03-06 VITALS
HEIGHT: 71 IN | HEART RATE: 77 BPM | OXYGEN SATURATION: 99 % | BODY MASS INDEX: 29.12 KG/M2 | WEIGHT: 208 LBS | SYSTOLIC BLOOD PRESSURE: 186 MMHG | DIASTOLIC BLOOD PRESSURE: 90 MMHG | TEMPERATURE: 98.1 F | RESPIRATION RATE: 18 BRPM

## 2019-03-06 DIAGNOSIS — H61.22 IMPACTED CERUMEN OF LEFT EAR: Primary | ICD-10-CM

## 2019-03-06 LAB
LEFT EYE DIABETIC RETINOPATHY: NORMAL
RIGHT EYE DIABETIC RETINOPATHY: NORMAL

## 2019-03-06 PROCEDURE — 2022F DILAT RTA XM EVC RTNOPTHY: CPT | Performed by: INTERNAL MEDICINE

## 2019-03-06 PROCEDURE — G0382 LEV 3 HOSP TYPE B ED VISIT: HCPCS | Performed by: FAMILY MEDICINE

## 2019-03-06 PROCEDURE — 99283 EMERGENCY DEPT VISIT LOW MDM: CPT | Performed by: FAMILY MEDICINE

## 2019-03-06 PROCEDURE — 99213 OFFICE O/P EST LOW 20 MIN: CPT | Performed by: FAMILY MEDICINE

## 2019-03-06 PROCEDURE — 69210 REMOVE IMPACTED EAR WAX UNI: CPT | Performed by: NURSE PRACTITIONER

## 2019-03-06 NOTE — PROGRESS NOTES
[facility]        NAME: Izabella Mathur is a 26 y o  male  : 1985    MRN: [id number]  DATE: 2019  TIME: 11:23 AM    Assessment and Plan   Impacted cerumen of left ear [H61 22]  1  Impacted cerumen of left ear  Ear cerumen removal         Patient Instructions     Patient Instructions   Moderate amount of cerumen removed from the left ear  Patient had some relief after removal   Recommend use over-the-counter Debrox rest and flush the ear  Blood pressure is elevated here again today  He states he is in contact with his nephrologist who is managing his blood pressure  He did make some changes this morning  He states he will continue to monitor blood pressure and follow up with them as needed  Recommend he go to the ER with any headaches, nausea, vomiting, shortness of breath, chest pain or any worsening symptoms  Chief Complaint     Chief Complaint   Patient presents with    Earache     pt c/o inner ear pain since last night          History of Present Illness   Izabella Mathur presents to the clinic c/o    This is a 68-year-old male here today with complaints of left ear pain  He states he noticed the pain last night  He states pain was worse at night and is now improving at this time  He rates his pain about 3/10  He states he does want to have it checked out early because he has had an ear infection in the past   No fevers bodies or chills  No cough or congestion  Blood pressure is elevated here today  He states he is having his blood pressure managed by his nephrologist   He states he did make some changes to the medication this morning according to them  They are where his blood pressures been elevated over the last several days  He states this morning when he checked his blood pressure it was 164  He states he checks his blood pressure about 3 to 4 times a day  He denies any headaches, chest pain, shortness of breath at this time    He is currently being cleared be placed on a kidney transplant list as he is in end-stage renal disease  He does peritonial  dialysis daily  Review of Systems   Review of Systems   Constitutional: Negative  HENT: Positive for ear pain  Negative for congestion, sinus pressure and sinus pain  Respiratory: Negative  Cardiovascular: Negative  Gastrointestinal: Negative  Neurological: Negative  Psychiatric/Behavioral: Negative            Current Medications     Long-Term Medications   Medication Sig Dispense Refill    atorvastatin (LIPITOR) 40 mg tablet Take 1 tablet (40 mg total) by mouth every evening 90 tablet 1    b complex vitamins capsule Take 1 capsule by mouth daily      B-D ULTRAFINE III SHORT PEN 31G X 8 MM MISC Inject under the skin 4 (four) times a day 400 each 3    calcium acetate (PHOSLO) 667 mg capsule Take 1 capsule (667 mg total) by mouth 3 (three) times a day with meals  0    Cholecalciferol (VITAMIN D) 2000 units CAPS Take 1 capsule by mouth daily      cinacalcet (SENSIPAR) 30 mg tablet Take 30 mg by mouth      clopidogrel (PLAVIX) 75 mg tablet Take 1 tablet (75 mg total) by mouth daily 90 tablet 3    escitalopram (LEXAPRO) 10 mg tablet Take 1 tablet (10 mg total) by mouth daily 90 tablet 2    gentamicin (GARAMYCIN) 0 1 % cream       hydrALAZINE (APRESOLINE) 100 MG tablet Take 0 5 tablets (50 mg total) by mouth 3 (three) times a day (Patient taking differently: Take 100 mg by mouth 100 mg twice daily)      insulin glargine (BASAGLAR KWIKPEN) 100 units/mL injection pen Inject 12u in AM and 13u in PM 5 pen 0    insulin lispro (ADMELOG SOLOSTAR) 100 units/mL injection pen Inject 10 units with sliding scale of 1 unit for every 25 over 150, 3 times daily with meals 10 pen 1    labetalol (NORMODYNE) 300 mg tablet Take 0 5 tablets (150 mg total) by mouth every 8 (eight) hours (Patient taking differently: Take 300 mg by mouth 3 (three) times a day  ) 60 tablet 3    NIFEdipine ER (ADALAT CC) 60 MG 24 hr tablet Take 1 tablet (60 mg total) by mouth daily (Patient taking differently: Take 60 mg by mouth 2 (two) times a day  ) 180 tablet 0    ondansetron (ZOFRAN) 4 mg tablet Take 1 tablet (4 mg total) by mouth every 8 (eight) hours as needed for nausea or vomiting 30 tablet 3       Current Allergies     Allergies as of 03/06/2019 - Reviewed 03/06/2019   Allergen Reaction Noted    Sulfa antibiotics Rash 07/21/2015            The following portions of the patient's history were reviewed and updated as appropriate: allergies, current medications, past family history, past medical history, past social history, past surgical history and problem list     Objective   BP (!) 186/90 (BP Location: Right arm, Patient Position: Sitting, Cuff Size: Standard)   Pulse 77   Temp 98 1 °F (36 7 °C) (Temporal)   Resp 18   Ht 5' 11" (1 803 m)   Wt 94 3 kg (208 lb)   SpO2 99%   BMI 29 01 kg/m²          Physical Exam     Physical Exam   Constitutional: He is oriented to person, place, and time  He appears well-developed and well-nourished  HENT:   Left Ear: External ear normal    Left ear occluded with moderate amount of cerumen  Large amount Cerumen removed   Neck: Normal range of motion  Neck supple  Cardiovascular: Normal rate and regular rhythm  Pulmonary/Chest: Effort normal and breath sounds normal    Neurological: He is alert and oriented to person, place, and time  Skin: Skin is warm and dry  Nursing note and vitals reviewed  Ear cerumen removal  Date/Time: 3/6/2019 11:22 AM  Performed by: [email or web address] by: DEISI Meeks     Patient location:  Clinic  Consent:     Consent obtained:  Verbal    Risks discussed:  Bleeding and dizziness  Procedure details:     Location:  L ear    Procedure type: curette      Approach:  External  Post-procedure details:     Complication:  None    Hearing quality:  Improved    Patient tolerance of procedure:   Tolerated well, no immediate complications

## 2019-03-06 NOTE — PATIENT INSTRUCTIONS
Moderate amount of cerumen removed from the left ear  Patient had some relief after removal   Recommend use over-the-counter Debrox rest and flush the ear  Blood pressure is elevated here again today  He states he is in contact with his nephrologist who is managing his blood pressure  He did make some changes this morning  He states he will continue to monitor blood pressure and follow up with them as needed  Recommend he go to the ER with any headaches, nausea, vomiting, shortness of breath, chest pain or any worsening symptoms

## 2019-03-08 ENCOUNTER — OFFICE VISIT (OUTPATIENT)
Dept: INTERNAL MEDICINE CLINIC | Facility: CLINIC | Age: 34
End: 2019-03-08
Payer: COMMERCIAL

## 2019-03-08 VITALS
DIASTOLIC BLOOD PRESSURE: 100 MMHG | WEIGHT: 212.6 LBS | TEMPERATURE: 98.7 F | RESPIRATION RATE: 14 BRPM | HEART RATE: 85 BPM | OXYGEN SATURATION: 98 % | HEIGHT: 71 IN | SYSTOLIC BLOOD PRESSURE: 198 MMHG | BODY MASS INDEX: 29.76 KG/M2

## 2019-03-08 DIAGNOSIS — F32.1 CURRENT MODERATE EPISODE OF MAJOR DEPRESSIVE DISORDER WITHOUT PRIOR EPISODE (HCC): ICD-10-CM

## 2019-03-08 DIAGNOSIS — I15.0 RENOVASCULAR HYPERTENSION: Primary | ICD-10-CM

## 2019-03-08 DIAGNOSIS — Z23 ENCOUNTER FOR IMMUNIZATION: ICD-10-CM

## 2019-03-08 DIAGNOSIS — E10.21 TYPE 1 DIABETES MELLITUS WITH DIABETIC NEPHROPATHY, WITH LONG-TERM CURRENT USE OF INSULIN (HCC): Chronic | ICD-10-CM

## 2019-03-08 PROCEDURE — 90732 PPSV23 VACC 2 YRS+ SUBQ/IM: CPT | Performed by: INTERNAL MEDICINE

## 2019-03-08 PROCEDURE — 90471 IMMUNIZATION ADMIN: CPT | Performed by: INTERNAL MEDICINE

## 2019-03-08 PROCEDURE — 90472 IMMUNIZATION ADMIN EACH ADD: CPT | Performed by: INTERNAL MEDICINE

## 2019-03-08 PROCEDURE — 90715 TDAP VACCINE 7 YRS/> IM: CPT | Performed by: INTERNAL MEDICINE

## 2019-03-08 PROCEDURE — 99214 OFFICE O/P EST MOD 30 MIN: CPT | Performed by: INTERNAL MEDICINE

## 2019-03-08 NOTE — ASSESSMENT & PLAN NOTE
He reports no hypoglycemic episode on Basaglar 12units in AM and 14units in PM and Lispro SSI  Continue same regimen with follow up Endo

## 2019-03-08 NOTE — ASSESSMENT & PLAN NOTE
He continues to be symptomatic but is stable on escitalopram 10mg daily, therefore will continue present treatment

## 2019-03-08 NOTE — PROGRESS NOTES
Assessment/Plan:    Type 1 diabetes mellitus with diabetic nephropathy, with long-term current use of insulin ([facility] [address] )  He reports no hypoglycemic episode on Basaglar 12units in AM and 14units in PM and Lispro SSI  Continue same regimen with follow up Endo  Renovascular hypertension  Elevated  Has slightly higher volume during the day which may be contributing  BP meds adjusted per Renal     Current moderate episode of major depressive disorder without prior episode ([facility] [address] )  He continues to be symptomatic but is stable on escitalopram 10mg daily, therefore will continue present treatment  1  Renovascular hypertension     2  Type 1 diabetes mellitus with diabetic nephropathy, with long-term current use of insulin ([facility])     3  Current moderate episode of major depressive disorder without prior episode ([facility] [address] )     4  Encounter for immunization  PNEUMOCOCCAL POLYSACCHARIDE VACCINE 23-VALENT =>1YO SQ IM    TDAP VACCINE GREATER THAN OR EQUAL TO 8YO IM       Subjective:      Patient ID: Huey Quispe is a 26 y o  male  HPI  42-year-old male with DM 1 with nephropathy, ESRD on PD, HTN, HLD, vitamin-D deficiency, MDD and h/o cerebellar stroke and L pontine CVA here for follow-up care  He is on transplant list at [facility]  He is accompanied by his mother  Home bp 160-190/-  Had recent adjustment in frequency of hydralazine to 100mg TID due to high morning bp  He is scheduled to take his next dose at 2p  He was getting a high FBS, basaglar adjusted to 12units in AM and 14units in PM, he is on Lispro SSI  Denies hypoglycemic symptoms  He has been vomiting at night with dialysis, therefore he has more fluid on +3 2pounds and gets drained in the evening over the past 3 nights      The following portions of the patient's history were reviewed and updated as appropriate: allergies, current medications, past family history, past medical history, past social history, past surgical history and problem list     Current Outpatient Medications:     atorvastatin (LIPITOR) 40 mg tablet, Take 1 tablet (40 mg total) by mouth every evening, Disp: 90 tablet, Rfl: 1    b complex vitamins capsule, Take 1 capsule by mouth daily, Disp: , Rfl:     B-D ULTRAFINE III SHORT PEN 31G X 8 MM MISC, Inject under the skin 4 (four) times a day, Disp: 400 each, Rfl: 3    calcium acetate (PHOSLO) 667 mg capsule, Take 1 capsule (667 mg total) by mouth 3 (three) times a day with meals, Disp: , Rfl: 0    Cholecalciferol (VITAMIN D) 2000 units CAPS, Take 1 capsule by mouth daily, Disp: , Rfl:     cinacalcet (SENSIPAR) 30 mg tablet, Take 30 mg by mouth, Disp: , Rfl:     clopidogrel (PLAVIX) 75 mg tablet, Take 1 tablet (75 mg total) by mouth daily, Disp: 90 tablet, Rfl: 3    ergocalciferol (VITAMIN D2) 50,000 units, Take 50,000 Units by mouth every 28 days , Disp: , Rfl:     escitalopram (LEXAPRO) 10 mg tablet, Take 1 tablet (10 mg total) by mouth daily, Disp: 90 tablet, Rfl: 2    gentamicin (GARAMYCIN) 0 1 % cream, , Disp: , Rfl:     hydrALAZINE (APRESOLINE) 100 MG tablet, Take 0 5 tablets (50 mg total) by mouth 3 (three) times a day (Patient taking differently: Take 100 mg by mouth 3 (three) times a day 100 mg twice daily), Disp: , Rfl:     insulin glargine (BASAGLAR KWIKPEN) 100 units/mL injection pen, Inject 12u in AM and 13u in PM, Disp: 5 pen, Rfl: 0    insulin lispro (ADMELOG SOLOSTAR) 100 units/mL injection pen, Inject 10 units with sliding scale of 1 unit for every 25 over 150, 3 times daily with meals, Disp: 10 pen, Rfl: 1    labetalol (NORMODYNE) 300 mg tablet, Take 0 5 tablets (150 mg total) by mouth every 8 (eight) hours (Patient taking differently: Take 300 mg by mouth 3 (three) times a day  ), Disp: 60 tablet, Rfl: 3    Melatonin 10 MG TABS, Take 3 mg by mouth daily at bedtime , Disp: , Rfl:     NIFEdipine ER (ADALAT CC) 60 MG 24 hr tablet, Take 1 tablet (60 mg total) by mouth daily (Patient taking differently: Take 60 mg by mouth 2 (two) times a day  ), Disp: 180 tablet, Rfl: 0    ondansetron (ZOFRAN) 4 mg tablet, Take 1 tablet (4 mg total) by mouth every 8 (eight) hours as needed for nausea or vomiting, Disp: 30 tablet, Rfl: 3    torsemide (DEMADEX) 20 mg tablet, Take 60 mg by mouth every morning 3 tablets for total of 60 mg in AM & 2 tablets for 40 mg in afternoon, Disp: , Rfl:     Review of Systems   Constitutional: Negative  Respiratory: Negative  Cardiovascular: Negative  Gastrointestinal: Positive for nausea and vomiting  Neurological: Positive for dizziness and headaches  Psychiatric/Behavioral: Positive for dysphoric mood and sleep disturbance  Objective:    BP (!) 198/100   Pulse 85   Temp 98 7 °F (37 1 °C)   Resp 14   Ht 5' 11" (1 803 m)   Wt 96 4 kg (212 lb 9 6 oz)   SpO2 98%   BMI 29 65 kg/m²      Physical Exam   Constitutional: He is oriented to person, place, and time  He appears well-developed and well-nourished  No distress  HENT:   Mouth/Throat: Oropharynx is clear and moist    Neck: Neck supple  Cardiovascular: Normal rate, regular rhythm and normal heart sounds  Trace edema   Pulmonary/Chest: Effort normal and breath sounds normal  No stridor  No respiratory distress  Neurological: He is alert and oriented to person, place, and time  Psychiatric: He has a normal mood and affect  His behavior is normal    Vitals reviewed

## 2019-03-08 NOTE — ASSESSMENT & PLAN NOTE
Elevated  Has slightly higher volume during the day which may be contributing    BP meds adjusted per Renal

## 2019-03-11 ENCOUNTER — HOSPITAL ENCOUNTER (EMERGENCY)
Facility: HOSPITAL | Age: 34
Discharge: HOME | End: 2019-03-11
Attending: EMERGENCY MEDICINE | Admitting: EMERGENCY MEDICINE
Payer: COMMERCIAL

## 2019-03-11 VITALS
SYSTOLIC BLOOD PRESSURE: 184 MMHG | OXYGEN SATURATION: 99 % | RESPIRATION RATE: 18 BRPM | HEART RATE: 85 BPM | DIASTOLIC BLOOD PRESSURE: 96 MMHG | TEMPERATURE: 98.9 F

## 2019-03-11 DIAGNOSIS — H92.03 EARACHE SYMPTOMS, BILATERAL: ICD-10-CM

## 2019-03-11 DIAGNOSIS — H61.23 BILATERAL IMPACTED CERUMEN: Primary | ICD-10-CM

## 2019-03-11 PROCEDURE — 99282 EMERGENCY DEPT VISIT SF MDM: CPT

## 2019-03-12 NOTE — ED PROVIDER NOTES
History  Chief Complaint   Patient presents with    Earache     Pt c/o left sided ear pain since friday  Pt states he was seen at an urgent care on friday and was told that his pain was due to ear war accumulation  Pt denies improvement since  History provided by:  Patient and parent  Earache   Affected ear: Bilateral, left greater than right  Behind ear:  No abnormality  Quality:  Aching and pressure  Severity:  Mild  Onset quality:  Gradual  Duration:  2 weeks  Timing:  Constant  Progression:  Worsening  Chronicity:  New  Context: not recent URI    Relieved by:  None tried  Worsened by:  Nothing  Ineffective treatments:  None tried  Associated symptoms: no congestion, no cough, no fever, no headaches and no rash        Prior to Admission Medications   Prescriptions Last Dose Informant Patient Reported? Taking?    B-D ULTRAFINE III SHORT PEN 31G X 8 MM MISC  Mother No No   Sig: Inject under the skin 4 (four) times a day   Cholecalciferol (VITAMIN D) 2000 units CAPS  Mother Yes No   Sig: Take 1 capsule by mouth daily   Melatonin 10 MG TABS  Self Yes No   Sig: Take 3 mg by mouth daily at bedtime    NIFEdipine ER (ADALAT CC) 60 MG 24 hr tablet  Mother No No   Sig: Take 1 tablet (60 mg total) by mouth daily   Patient taking differently: Take 60 mg by mouth 2 (two) times a day     atorvastatin (LIPITOR) 40 mg tablet  Mother No No   Sig: Take 1 tablet (40 mg total) by mouth every evening   b complex vitamins capsule  Mother Yes No   Sig: Take 1 capsule by mouth daily   calcium acetate (PHOSLO) 667 mg capsule  Mother No No   Sig: Take 1 capsule (667 mg total) by mouth 3 (three) times a day with meals   cinacalcet (SENSIPAR) 30 mg tablet   Yes No   Sig: Take 30 mg by mouth   clopidogrel (PLAVIX) 75 mg tablet  Mother No No   Sig: Take 1 tablet (75 mg total) by mouth daily   ergocalciferol (VITAMIN D2) 50,000 units  Mother Yes No   Sig: Take 50,000 Units by mouth every 28 days    escitalopram (LEXAPRO) 10 mg tablet Mother No No   Sig: Take 1 tablet (10 mg total) by mouth daily   gentamicin (GARAMYCIN) 0 1 % cream   Yes No   hydrALAZINE (APRESOLINE) 100 MG tablet  Mother No No   Sig: Take 0 5 tablets (50 mg total) by mouth 3 (three) times a day   Patient taking differently: Take 100 mg by mouth 3 (three) times a day 100 mg twice daily   insulin glargine (BASAGLAR KWIKPEN) 100 units/mL injection pen  Mother No No   Sig: Inject 12u in AM and 13u in PM   insulin lispro (ADMELOG SOLOSTAR) 100 units/mL injection pen  Mother No No   Sig: Inject 10 units with sliding scale of 1 unit for every 25 over 150, 3 times daily with meals   labetalol (NORMODYNE) 300 mg tablet  Mother No No   Sig: Take 0 5 tablets (150 mg total) by mouth every 8 (eight) hours   Patient taking differently: Take 300 mg by mouth 3 (three) times a day     ondansetron (ZOFRAN) 4 mg tablet  Mother No No   Sig: Take 1 tablet (4 mg total) by mouth every 8 (eight) hours as needed for nausea or vomiting   torsemide (DEMADEX) 20 mg tablet  Mother Yes No   Sig: Take 60 mg by mouth every morning 3 tablets for total of 60 mg in AM & 2 tablets for 40 mg in afternoon      Facility-Administered Medications: None       Past Medical History:   Diagnosis Date    Acute kidney injury ([facility] [address] )     Cerebellar stroke side undetermined 2015 2015,1/2018    Diabetes type 1, controlled ([facility] [address] )     IDDM    History of shingles 2010    History of transfusion 02/2018    Hypertension     Muscle weakness     general unsteadiness    Retinopathy        Past Surgical History:   Procedure Laterality Date    CARDIAC LOOP RECORDER  05/2018    EYE SURGERY      PERITONEAL CATHETER INSERTION N/A 8/27/2018    Procedure: UNROOF PD CATHETER;  Surgeon: Anish Garcia DO;  [facility];  Service: General    MD LAP INSERTION TUNNELED INTRAPERITONEAL CATHETER N/A 8/6/2018    Procedure: LAPAROSCOPIC PD CATHETER PLACEMENT;  Surgeon: Anish Garcia DO;  [facility];  Service: [facility] TONSILLECTOMY         Family History   Problem Relation Age of Onset   Meryl Leo Breast cancer Mother     Hypertension Mother     Hyperlipidemia Father     Hypertension Father     Leukemia Maternal Grandmother     Hyperlipidemia Maternal Grandfather     Hypertension Maternal Grandfather     Hyperlipidemia Paternal Grandmother     Hypertension Paternal Grandmother     Heart disease Paternal Grandfather         cardiac disorder    Diabetes Paternal Grandfather      I have reviewed and agree with the history as documented  Social History     Tobacco Use    Smoking status: Former Smoker     Packs/day: 0 50     Years: 12 00     Pack years: 6 00     Types: Cigarettes    Smokeless tobacco: Never Used    Tobacco comment: quit 2/2018   Substance Use Topics    Alcohol use: Yes     Comment: rarely    Drug use: Not Currently     Types: Marijuana     Comment: weekly-none since 11/2017        Review of Systems   Constitutional: Negative for fever  HENT: Positive for ear pain  Negative for congestion  Respiratory: Negative for cough, chest tightness and shortness of breath  Cardiovascular: Negative for chest pain  Skin: Negative for rash  Neurological: Negative for dizziness, light-headedness and headaches  Physical Exam  Physical Exam   Constitutional: He appears well-developed  HENT:   Head: Atraumatic  Bilateral cerumen impaction  , post bilateral cerumen impaction removal by irrigation, repeat examination, TMs visual bilaterally, both normal in appearance no evidence of effusion no evidence of otitis media   Eyes: Conjunctivae are normal  Right eye exhibits no discharge  Left eye exhibits no discharge  No scleral icterus  Neck: Neck supple  No tracheal deviation present  Pulmonary/Chest: Effort normal  No stridor  No respiratory distress  Musculoskeletal: He exhibits no deformity  Neurological: He is alert  He exhibits normal muscle tone  Coordination normal    Skin: Skin is warm and dry  No rash noted  No erythema  No pallor  Psychiatric: He has a normal mood and affect  Vitals reviewed  Vital Signs  ED Triage Vitals   Temperature Pulse Respirations Blood Pressure SpO2   03/11/19 2034 03/11/19 2034 03/11/19 2034 03/11/19 2035 03/11/19 2034   98 9 °F (37 2 °C) 85 18 (!) 184/96 99 %      Temp Source Heart Rate Source Patient Position - Orthostatic VS BP Location FiO2 (%)   03/11/19 2034 03/11/19 2034 03/11/19 2035 03/11/19 2035 --   Oral Monitor Sitting Left arm       Pain Score       03/11/19 2034       4           Vitals:    03/11/19 2034 03/11/19 2035   BP:  (!) 184/96   Pulse: 85    Patient Position - Orthostatic VS:  Sitting       Visual Acuity      ED Medications  Medications - No data to display    Diagnostic Studies  Results Reviewed     None                 No orders to display              Procedures  Cerumen Removal  Date/Time: 3/11/2019 8:49 PM  Performed by: Nelson Fairbanks DO  Authorized by: Nelson Fairbanks DO     Patient location:  ED  Consent:     Consent obtained:  Verbal    Consent given by:  Patient  Universal protocol:     Patient identity confirmed:  Verbally with patient  Procedure details:     Local anesthetic:  None    Location:  L ear    Procedure type: irrigation      Approach:  External    Visualization (free text):  Large amount of hard cerumen    Equipment used:  60 cc syringe with a 14 gauge Angiocath in warm water  Post-procedure details:     Complication:  None    Hearing quality:  Improved    Patient tolerance of procedure:   Tolerated well, no immediate complications  Cerumen Removal  Date/Time: 3/11/2019 8:50 PM  Performed by: Nelson Fairbanks DO  Authorized by: Nelson Fairbanks DO     Patient location:  ED  Consent:     Consent obtained:  Verbal    Consent given by:  Patient    Alternatives discussed:  No treatment  Universal protocol:     Patient identity confirmed:  Verbally with patient  Procedure details:     Local anesthetic:  None    Location:  R ear    Procedure type: irrigation      Approach:  External    Visualization (free text):  Large amount of cerumen    Equipment used:  60 cc syringe with a 14 gauge Angiocath in warm water           Phone Contacts  ED Phone Contact    ED Course                               MDM  Number of Diagnoses or Management Options  Bilateral impacted cerumen: new and requires workup  Earache symptoms, bilateral: new and requires workup  Diagnosis management comments: Bilateral cerumen impaction, post irrigation symptoms of bilateral earache completely resolved  Amount and/or Complexity of Data Reviewed  Decide to obtain previous medical records or to obtain history from someone other than the patient: yes  Review and summarize past medical records: yes        Disposition  Final diagnoses:   Bilateral impacted cerumen   Earache symptoms, bilateral     Time reflects when diagnosis was documented in both MDM as applicable and the Disposition within this note     Time User Action Codes Description Comment    3/11/2019  8:49 PM Clay Garcia Add [E24 07] Bilateral impacted cerumen     3/11/2019  8:49 PM Clay Sr [H92 03] Earache symptoms, bilateral       ED Disposition     ED Disposition Condition Date/Time Comment    Discharge Stable Mon Mar 11, 2019  8:48 PM Marisa Mckeon discharge to home/self care              Follow-up Information    None         Discharge Medication List as of 3/11/2019  8:49 PM      CONTINUE these medications which have NOT CHANGED    Details   atorvastatin (LIPITOR) 40 mg tablet Take 1 tablet (40 mg total) by mouth every evening, Starting Mon 1/28/2019, Normal      b complex vitamins capsule Take 1 capsule by mouth daily, Historical Med      B-D ULTRAFINE III SHORT PEN 31G X 8 MM MISC Inject under the skin 4 (four) times a day, Starting u 8/9/2018, Normal      calcium acetate (PHOSLO) 667 mg capsule Take 1 capsule (667 mg total) by mouth 3 (three) times a day with meals, Starting Mon 9/17/2018, No Print      Cholecalciferol (VITAMIN D) 2000 units CAPS Take 1 capsule by mouth daily, Historical Med      cinacalcet (SENSIPAR) 30 mg tablet Take 30 mg by mouth, Historical Med      clopidogrel (PLAVIX) 75 mg tablet Take 1 tablet (75 mg total) by mouth daily, Starting Tue 2/19/2019, Normal      ergocalciferol (VITAMIN D2) 50,000 units Take 50,000 Units by mouth every 28 days , Starting Mon 8/27/2018, Historical Med      escitalopram (LEXAPRO) 10 mg tablet Take 1 tablet (10 mg total) by mouth daily, Starting Mon 11/5/2018, Normal      gentamicin (GARAMYCIN) 0 1 % cream Starting Tue 11/27/2018, Historical Med      hydrALAZINE (APRESOLINE) 100 MG tablet Take 0 5 tablets (50 mg total) by mouth 3 (three) times a day, Starting Mon 9/17/2018, No Print      insulin glargine (BASAGLAR KWIKPEN) 100 units/mL injection pen Inject 12u in AM and 13u in PM, Normal      insulin lispro (ADMELOG SOLOSTAR) 100 units/mL injection pen Inject 10 units with sliding scale of 1 unit for every 25 over 150, 3 times daily with meals, Normal      labetalol (NORMODYNE) 300 mg tablet Take 0 5 tablets (150 mg total) by mouth every 8 (eight) hours, Starting Thu 4/12/2018, Normal      Melatonin 10 MG TABS Take 3 mg by mouth daily at bedtime , Historical Med      NIFEdipine ER (ADALAT CC) 60 MG 24 hr tablet Take 1 tablet (60 mg total) by mouth daily, Starting Mon 9/17/2018, No Print      ondansetron (ZOFRAN) 4 mg tablet Take 1 tablet (4 mg total) by mouth every 8 (eight) hours as needed for nausea or vomiting, Starting Thu 8/16/2018, Normal      torsemide (DEMADEX) 20 mg tablet Take 60 mg by mouth every morning 3 tablets for total of 60 mg in AM & 2 tablets for 40 mg in afternoon, Starting Tue 10/16/2018, Historical Med           No discharge procedures on file      ED Provider  Electronically Signed by           Sandrita Álvarez DO  03/11/19 2239

## 2019-03-18 DIAGNOSIS — R51.9 NONINTRACTABLE HEADACHE, UNSPECIFIED CHRONICITY PATTERN, UNSPECIFIED HEADACHE TYPE: Primary | ICD-10-CM

## 2019-03-20 ENCOUNTER — OFFICE VISIT (OUTPATIENT)
Dept: NEUROLOGY | Facility: CLINIC | Age: 34
End: 2019-03-20
Payer: COMMERCIAL

## 2019-03-20 ENCOUNTER — TELEPHONE (OUTPATIENT)
Dept: NEUROLOGY | Facility: CLINIC | Age: 34
End: 2019-03-20

## 2019-03-20 VITALS
HEART RATE: 78 BPM | SYSTOLIC BLOOD PRESSURE: 182 MMHG | HEIGHT: 71 IN | DIASTOLIC BLOOD PRESSURE: 90 MMHG | WEIGHT: 208 LBS | BODY MASS INDEX: 29.12 KG/M2

## 2019-03-20 DIAGNOSIS — Z15.89 HOMOZYGOUS MTHFR MUTATION C677T: Chronic | ICD-10-CM

## 2019-03-20 DIAGNOSIS — I15.0 RENOVASCULAR HYPERTENSION: ICD-10-CM

## 2019-03-20 DIAGNOSIS — R51.9 NONINTRACTABLE HEADACHE, UNSPECIFIED CHRONICITY PATTERN, UNSPECIFIED HEADACHE TYPE: ICD-10-CM

## 2019-03-20 DIAGNOSIS — D68.52 HETEROZYGOUS FOR PROTHROMBIN G20210A MUTATION (HCC): ICD-10-CM

## 2019-03-20 DIAGNOSIS — E10.21 TYPE 1 DIABETES MELLITUS WITH DIABETIC NEPHROPATHY, WITH LONG-TERM CURRENT USE OF INSULIN (HCC): Primary | Chronic | ICD-10-CM

## 2019-03-20 DIAGNOSIS — E78.5 DYSLIPIDEMIA: ICD-10-CM

## 2019-03-20 DIAGNOSIS — Z86.73 HISTORY OF LACUNAR CEREBROVASCULAR ACCIDENT (CVA): Chronic | ICD-10-CM

## 2019-03-20 PROCEDURE — 99214 OFFICE O/P EST MOD 30 MIN: CPT | Performed by: PSYCHIATRY & NEUROLOGY

## 2019-03-20 RX ORDER — CLONIDINE HYDROCHLORIDE 0.1 MG/1
0.1 TABLET ORAL 3 TIMES DAILY
COMMUNITY
End: 2019-12-18 | Stop reason: DRUGHIGH

## 2019-03-20 NOTE — PROGRESS NOTES
Received overdue results for the following active labs: basic metabolic panel and hemoglobin A1C ordered by Franca Alcaarz PA-C on 12/11/18  Pt reminded to complete them  Pt understood

## 2019-03-21 ENCOUNTER — APPOINTMENT (OUTPATIENT)
Dept: LAB | Facility: CLINIC | Age: 34
End: 2019-03-21
Payer: COMMERCIAL

## 2019-03-21 ENCOUNTER — TRANSCRIBE ORDERS (OUTPATIENT)
Dept: LAB | Facility: CLINIC | Age: 34
End: 2019-03-21

## 2019-03-21 DIAGNOSIS — E10.21 TYPE 1 DIABETES MELLITUS WITH DIABETIC NEPHROPATHY, WITH LONG-TERM CURRENT USE OF INSULIN (HCC): Chronic | ICD-10-CM

## 2019-03-21 LAB
ANION GAP SERPL CALCULATED.3IONS-SCNC: 12 MMOL/L (ref 4–13)
BUN SERPL-MCNC: 64 MG/DL (ref 5–25)
CALCIUM SERPL-MCNC: 8.8 MG/DL (ref 8.3–10.1)
CHLORIDE SERPL-SCNC: 103 MMOL/L (ref 100–108)
CO2 SERPL-SCNC: 24 MMOL/L (ref 21–32)
CREAT SERPL-MCNC: 10.45 MG/DL (ref 0.6–1.3)
EST. AVERAGE GLUCOSE BLD GHB EST-MCNC: 88 MG/DL
GFR SERPL CREATININE-BSD FRML MDRD: 6 ML/MIN/1.73SQ M
GLUCOSE P FAST SERPL-MCNC: 109 MG/DL (ref 65–99)
HBA1C MFR BLD: 4.7 % (ref 4.2–6.3)
POTASSIUM SERPL-SCNC: 3.9 MMOL/L (ref 3.5–5.3)
SODIUM SERPL-SCNC: 139 MMOL/L (ref 136–145)

## 2019-03-21 PROCEDURE — 36415 COLL VENOUS BLD VENIPUNCTURE: CPT

## 2019-03-21 PROCEDURE — 80048 BASIC METABOLIC PNL TOTAL CA: CPT

## 2019-03-21 PROCEDURE — 83036 HEMOGLOBIN GLYCOSYLATED A1C: CPT

## 2019-03-23 DIAGNOSIS — N18.4 CHRONIC KIDNEY DISEASE, STAGE 4 (SEVERE) (HCC): Chronic | ICD-10-CM

## 2019-03-25 ENCOUNTER — OFFICE VISIT (OUTPATIENT)
Dept: GASTROENTEROLOGY | Facility: AMBULARY SURGERY CENTER | Age: 34
End: 2019-03-25
Payer: COMMERCIAL

## 2019-03-25 ENCOUNTER — APPOINTMENT (OUTPATIENT)
Dept: LAB | Facility: AMBULARY SURGERY CENTER | Age: 34
End: 2019-03-25
Attending: INTERNAL MEDICINE
Payer: COMMERCIAL

## 2019-03-25 VITALS
WEIGHT: 209.4 LBS | DIASTOLIC BLOOD PRESSURE: 76 MMHG | RESPIRATION RATE: 14 BRPM | HEIGHT: 71 IN | BODY MASS INDEX: 29.31 KG/M2 | HEART RATE: 70 BPM | TEMPERATURE: 98 F | SYSTOLIC BLOOD PRESSURE: 142 MMHG

## 2019-03-25 DIAGNOSIS — D64.9 ANEMIA, UNSPECIFIED TYPE: ICD-10-CM

## 2019-03-25 DIAGNOSIS — R11.2 NAUSEA AND VOMITING, INTRACTABILITY OF VOMITING NOT SPECIFIED, UNSPECIFIED VOMITING TYPE: ICD-10-CM

## 2019-03-25 DIAGNOSIS — R19.7 DIARRHEA, UNSPECIFIED TYPE: Primary | ICD-10-CM

## 2019-03-25 DIAGNOSIS — R19.7 DIARRHEA, UNSPECIFIED TYPE: ICD-10-CM

## 2019-03-25 LAB
CRP SERPL QL: <3 MG/L
ERYTHROCYTE [SEDIMENTATION RATE] IN BLOOD: 22 MM/HOUR (ref 0–10)

## 2019-03-25 PROCEDURE — 87209 SMEAR COMPLEX STAIN: CPT

## 2019-03-25 PROCEDURE — 85652 RBC SED RATE AUTOMATED: CPT

## 2019-03-25 PROCEDURE — 87177 OVA AND PARASITES SMEARS: CPT

## 2019-03-25 PROCEDURE — 99244 OFF/OP CNSLTJ NEW/EST MOD 40: CPT | Performed by: INTERNAL MEDICINE

## 2019-03-25 PROCEDURE — 86140 C-REACTIVE PROTEIN: CPT

## 2019-03-25 PROCEDURE — 87505 NFCT AGENT DETECTION GI: CPT

## 2019-03-25 PROCEDURE — 36415 COLL VENOUS BLD VENIPUNCTURE: CPT

## 2019-03-25 RX ORDER — ONDANSETRON 4 MG/1
4 TABLET, FILM COATED ORAL EVERY 8 HOURS PRN
Qty: 30 TABLET | Refills: 0 | Status: SHIPPED | OUTPATIENT
Start: 2019-03-25 | End: 2020-11-25 | Stop reason: HOSPADM

## 2019-03-25 NOTE — PATIENT INSTRUCTIONS
Patient is scheduled for an egd with dr sosa at [facility] on 4/18/19  Patient provided lab and stool studie requisition at check out  Patient is a diabetic and takes plavix sent a fax to dr Villareal List office via fax for a medication clearance   [phone]

## 2019-03-25 NOTE — PROGRESS NOTES
Consultation - [facility]  Adilene Gr Augustokyung 26 y o  male MRN: [id number]  Unit/Bed#:  Encounter: [id number]        Consults    ASSESSMENT/PLAN:   1  Nausea/vomiting-l vomiting resolved since PD fluid was change recently however nausea persists  I suspect he may have gastritis versus gastroparesis given history of diabetes  -we discussed eating small meals, low in fat, and to avoid acidic foods   -would recommend EGD to assess for peptic ulcer disease/gastritis   -avoid the use of NSAIDs   -would hold off on starting PPI given history of end-stage renal disease  Discussed potentially starting over-the-counter H2 blocker if bloating/nausea persists  -patient is currently prescribed Zofran on as-needed basis, he states that he requires this very infrequently over the past several weeks  -obtain clearance from Neurology in regards to holding Plavix 5 days before the procedure  2  Diarrhea-suspect may be secondary to diabetic enteropathy versus a functional versus a less likely infectious or inflammatory  -will check stool studies for ova and parasites and stool culture   -will also check inflammatory markers, if these are elevated, may raise the possibility of inflammatory bowel disease though this is less likely given no rectal bleeding or abdominal pain   -would recommend glycemic control   -start on over-the-counter probiotic   -will hold off on colonoscopy if there is no symptomatic improvement with dietary changes, probiotics or if inflammatory markers are elevated  This was discussed with both the patient and his parents who were present at the time of the appointment   -labs were reviewed, LFTs were unremarkable in December, C diff is negative in February, he was noted to be anemic to 8 8 which I suspect is likely secondary to end-stage renal disease    MCV was 91      3  Anemia-likely multifactorial, has history of end-stage renal disease, iron studies last year were notable for iron deficiency as well  -fecal occult blood test was negative last year   -while we will go ahead with EGD, hold off on colonoscopy unless the diarrhea symptoms do not improve         ______________________________________________________________________    Reason for Consult / Principal Problem: [unfilled]    HPI: Yissel Laboy is a 26y o  year old male with history of type 1 diabetic, end-stage renal disease on dialysis, history of CVAs x2, on Plavix currently being evaluated for kidney/pancreas transplant at [facility] presents for evaluation of nausea/vomiting and diarrhea symptoms  Patient states that his vomiting has mostly resolved since his dialysis fluid was changed to several weeks ago  He states that he does still feels slightly nauseous but denies any heartburn symptoms  He also endorses that he has had loose stools for the past 5-6 months  He states that symptoms began soon after his dialysis catheter was changed  He has been tested for C diff which was negative as per patient report  His new medications around the time of the symptom onset was Lexapro  He denies recent travel, antibiotic use or sick contacts at the onset of symptoms  He also endorses that he occasionally has loose stools and nighttime but denies any blood in the stool  He states that he has 2-3 bowel movements on a daily basis  He denies abdominal pan or unintentional weight loss  No NSAID use  Review of Systems: The remainder of the review of systems was negative except for the pertinent positives noted in HPI       Historical Information   Past Medical History:   Diagnosis Date    [facility] kidney [facility] [address] [facility]     Cerebellar stroke side undetermined 2015 2015,1/2018    Diabetes type 1, controlled ([facility] [address] )     IDDM    History of shingles 2010    History of transfusion 02/2018    Hypertension     Muscle weakness     general unsteadiness    Retinopathy      Past Surgical History: Procedure Laterality Date    CARDIAC LOOP RECORDER  2018    EYE SURGERY      PERITONEAL CATHETER INSERTION N/A 2018    Procedure: UNROOF PD CATHETER;  Surgeon: Branden Casanova DO;  [facility];  Service: General    [place] LAP INSERTION TUNNELED INTRAPERITONEAL CATHETER N/A 2018    Procedure: LAPAROSCOPIC PD CATHETER PLACEMENT;  Surgeon: Branden Casanova DO;  [facility];  Service: General    TONSILLECTOMY       Social History   Social History     Substance and Sexual Activity   Alcohol Use Not Currently    Comment: rarely     Social History     Substance and Sexual Activity   Drug Use Not Currently    Types: Marijuana    Comment: weekly-none since 2017     Social History     Tobacco Use   Smoking Status Former Smoker    Packs/day: 0 50    Years: 12 00    Pack years: 6 00    Types: Cigarettes    Last attempt to quit: 2018    Years since quittin 1   Smokeless Tobacco Never Used   Tobacco Comment    quit 2018     Family History   Problem Relation Age of Onset    Breast cancer Mother     Hypertension Mother     Hyperlipidemia Father     Hypertension Father     Leukemia Maternal Grandmother     Hyperlipidemia Maternal Grandfather     Hypertension Maternal Grandfather     Hyperlipidemia Paternal Grandmother     Hypertension Paternal Grandmother     Heart disease Paternal Grandfather         cardiac disorder    Diabetes Paternal Grandfather        Meds/Allergies       (Not in a hospital admission)  No current facility-administered medications for this visit  Allergies   Allergen Reactions    Sulfa Antibiotics Rash       Objective     Blood pressure 142/76, pulse 70, temperature 98 °F (36 7 °C), temperature source Tympanic, resp  rate 14, height 5' 11" (1 803 m), weight 95 kg (209 lb 6 4 oz)      [unfilled]    PHYSICAL EXAM     GEN: well nourished, well developed, no acute distress  HEENT: anicteric, MMM, no cervical or supraclavicular lymphadenopathy  CV: RRR, no m/r/g  CHEST: CTA b/l, no WRR  ABD: +BS, soft, NT/ND, no hepatosplenomegaly  EXT: no c/c/e  SKIN: no rashes,  NEURO: aaox3    Lab Results:   No visits with results within 1 Day(s) from this visit     Latest known visit with results is:   Appointment on 03/21/2019   Component Date Value    Hemoglobin A1C 03/21/2019 4 7     EAG 03/21/2019 88     Sodium 03/21/2019 139     Potassium 03/21/2019 3 9     Chloride 03/21/2019 103     CO2 03/21/2019 24     ANION GAP 03/21/2019 12     BUN 03/21/2019 64*    Creatinine 03/21/2019 10 45*    Glucose, Fasting 03/21/2019 109*    Calcium 03/21/2019 8 8     eGFR 03/21/2019 6      Imaging Studies: I have personally reviewed pertinent films in PACS

## 2019-03-25 NOTE — LETTER
March 25, 2019     Referral [address] [place] [id number]    Patient: Terence Clark   YOB: 1985   Date of Visit: 3/25/2019       Dear Dr Lexie Pearce:    Thank you for referring Carin Ngo to me for evaluation  Below are my notes for this consultation  If you have questions, please do not hesitate to call me  I look forward to following your patient along with you  Sincerely,        Juan Delgado MD        CC: Real Brunner, MD  3/25/2019  9:36 AM  Sign at close encounter  Consultation - [facility]  Willow Mckeon 26 y o  male MRN: [id number]  Unit/Bed#:  Encounter: [id number]        Consults    ASSESSMENT/PLAN:   1  Nausea/vomiting-l vomiting resolved since PD fluid was change recently however nausea persists  I suspect he may have gastritis versus gastroparesis given history of diabetes  -we discussed eating small meals, low in fat, and to avoid acidic foods   -would recommend EGD to assess for peptic ulcer disease/gastritis   -avoid the use of NSAIDs   -would hold off on starting PPI given history of end-stage renal disease  Discussed potentially starting over-the-counter H2 blocker if bloating/nausea persists  -patient is currently prescribed Zofran on as-needed basis, he states that he requires this very infrequently over the past several weeks  -obtain clearance from Neurology in regards to holding Plavix 5 days before the procedure  2  Diarrhea-suspect may be secondary to diabetic enteropathy versus a functional versus a less likely infectious or inflammatory    -will check stool studies for ova and parasites and stool culture   -will also check inflammatory markers, if these are elevated, may raise the possibility of inflammatory bowel disease though this is less likely given no rectal bleeding or abdominal pain   -would recommend glycemic control   -start on over-the-counter probiotic   -will hold off on colonoscopy if there is no symptomatic improvement with dietary changes, probiotics or if inflammatory markers are elevated  This was discussed with both the patient and his parents who were present at the time of the appointment   -labs were reviewed, LFTs were unremarkable in December, C diff is negative in February, he was noted to be anemic to 8 8 which I suspect is likely secondary to end-stage renal disease  MCV was 91      3  Anemia-likely multifactorial, has history of end-stage renal disease, iron studies last year were notable for iron deficiency as well  -fecal occult blood test was negative last year   -while we will go ahead with EGD, hold off on colonoscopy unless the diarrhea symptoms do not improve         ______________________________________________________________________    Reason for Consult / Principal Problem: [unfilled]    HPI: Breanna Sorto is a 26y o  year old male with history of type 1 diabetic, end-stage renal disease on dialysis, history of CVAs x2, on Plavix currently being evaluated for kidney/pancreas transplant at [facility] presents for evaluation of nausea/vomiting and diarrhea symptoms  Patient states that his vomiting has mostly resolved since his dialysis fluid was changed to several weeks ago  He states that he does still feels slightly nauseous but denies any heartburn symptoms  He also endorses that he has had loose stools for the past 5-6 months  He states that symptoms began soon after his dialysis catheter was changed  He has been tested for C diff which was negative as per patient report  His new medications around the time of the symptom onset was Lexapro  He denies recent travel, antibiotic use or sick contacts at the onset of symptoms  He also endorses that he occasionally has loose stools and nighttime but denies any blood in the stool  He states that he has 2-3 bowel movements on a daily basis    He denies abdominal pan or unintentional weight loss  No NSAID use  Review of Systems: The remainder of the review of systems was negative except for the pertinent positives noted in HPI       Historical Information   Past Medical History:   Diagnosis Date    Acute kidney injury ([facility] [address] [facility]     Cerebellar stroke side undetermined 2015,2018    Diabetes type 1, controlled ([facility] [address] )     IDDM    History of shingles     History of transfusion 2018    Hypertension     Muscle weakness     general unsteadiness    Retinopathy      Past Surgical History:   Procedure Laterality Date    CARDIAC LOOP RECORDER  2018    EYE SURGERY      PERITONEAL CATHETER INSERTION N/A 2018    Procedure: UNROOF PD CATHETER;  Surgeon: Shannan Ron DO;  [facility];  Service: General    [place] LAP INSERTION TUNNELED INTRAPERITONEAL CATHETER N/A 2018    Procedure: LAPAROSCOPIC PD CATHETER PLACEMENT;  Surgeon: Shannan Ron DO;  [facility];  Service: General    TONSILLECTOMY       Social History   Social History     Substance and Sexual Activity   Alcohol Use Not Currently    Comment: rarely     Social History     Substance and Sexual Activity   Drug Use Not Currently    Types: Marijuana    Comment: weekly-none since 2017     Social History     Tobacco Use   Smoking Status Former Smoker    Packs/day: 0 50    Years: 12 00    Pack years: 6 00    Types: Cigarettes    Last attempt to quit: 2018    Years since quittin 1   Smokeless Tobacco Never Used   Tobacco Comment    quit 2018     Family History   Problem Relation Age of Onset    Breast cancer Mother     Hypertension Mother     Hyperlipidemia Father     Hypertension Father     Leukemia Maternal Grandmother     Hyperlipidemia Maternal Grandfather     Hypertension Maternal Grandfather     Hyperlipidemia Paternal Grandmother     Hypertension Paternal Grandmother     Heart disease Paternal Grandfather         cardiac disorder    Diabetes Paternal Grandfather        Meds/Allergies       (Not in a hospital admission)  No current facility-administered medications for this visit  Allergies   Allergen Reactions    Sulfa Antibiotics Rash       Objective     Blood pressure 142/76, pulse 70, temperature 98 °F (36 7 °C), temperature source Tympanic, resp  rate 14, height 5' 11" (1 803 m), weight 95 kg (209 lb 6 4 oz)  [unfilled]    PHYSICAL EXAM     GEN: well nourished, well developed, no acute distress  HEENT: anicteric, MMM, no cervical or supraclavicular lymphadenopathy  CV: RRR, no m/r/g  CHEST: CTA b/l, no WRR  ABD: +BS, soft, NT/ND, no hepatosplenomegaly  EXT: no c/c/e  SKIN: no rashes,  NEURO: aaox3    Lab Results:   No visits with results within 1 Day(s) from this visit     Latest known visit with results is:   Appointment on 03/21/2019   Component Date Value    Hemoglobin A1C 03/21/2019 4 7     EAG 03/21/2019 88     Sodium 03/21/2019 139     Potassium 03/21/2019 3 9     Chloride 03/21/2019 103     CO2 03/21/2019 24     ANION GAP 03/21/2019 12     BUN 03/21/2019 64*    Creatinine 03/21/2019 10 45*    Glucose, Fasting 03/21/2019 109*    Calcium 03/21/2019 8 8     eGFR 03/21/2019 6      Imaging Studies: I have personally reviewed pertinent films in PACS

## 2019-03-26 ENCOUNTER — TELEPHONE (OUTPATIENT)
Dept: GASTROENTEROLOGY | Facility: AMBULARY SURGERY CENTER | Age: 34
End: 2019-03-26

## 2019-03-26 LAB
CAMPYLOBACTER DNA SPEC NAA+PROBE: NORMAL
SALMONELLA DNA SPEC QL NAA+PROBE: NORMAL
SHIGA TOXIN STX GENE SPEC NAA+PROBE: NORMAL
SHIGELLA DNA SPEC QL NAA+PROBE: NORMAL

## 2019-03-26 NOTE — PROGRESS NOTES
Daily Note     Today's date: 3/16/2018  Patient name: Isabelle Handley  : 1985  MRN: [id number]  Referring provider: Albina Delgado DO  Dx:   Encounter Diagnosis     ICD-10-CM    1  Cerebrovascular accident (CVA) of left pontine structure ([address] 50                   Subjective: No new changes since last session  Objective: See treatment diary below  Precautions: CVA with visual disturbances, CKD, DM type 1, HTN, falls     Daily Treatment Diary         Exercise Diary  2/26/18  2018  3/9  3/14/18  3/16   Bike 15 min  10 min  10 min L2  l2 x10 min  10 min   Treadmill             FTEO  airex 30"x3  airex 30"x3  airex 30"x3       FAEC airex 30"x3  airex 30"x3  airex 30"x3       Tandem gait     3 laps  Mini //   3 laps // bars [employer]  40'x2, arms out  3 laps   Semi-tandem stance 30"x2  30"x2 Tandem 30"x2  semi  [id number] EC     HK marches 10ft // bars 4 laps no UE  10ft // bars 4 laps no UE  3 laps  in gonzalez  40'x2     STS feet staggered    10x L foot back  10x R foot back   15x L foot back  15x R foot back     3x10   Step taps from foam    20x ea 6" step    8"  2x30     Rockerboard taps A/P 15x - occ use of Ue  M/L A/P 15x - occ use of Ue  M/L  A/P 15x - occ use of Ue  M/L M/L, A/P  1 min ea  40x ea   HR/TR standing 20x ea   10ft // bars 4 laps no UE  30xea    heel walking/toe walking 3 laps ea    cone taps    10xea From foam  10xea  From foam  20xea     Step downs     10xea 4"  6" R/L  2x15      BWD walking with Hts, H/V        2x40' each      lunges          3 laps    step ups          foam 2x15 ea                                                              Assessment: Patient will continue to benefit from exercises on uneven surfaces  Plan next session to progress as able  Plan: Continue per plan of care  Continue with progressions from today  no

## 2019-03-27 ENCOUNTER — TELEPHONE (OUTPATIENT)
Dept: GASTROENTEROLOGY | Facility: CLINIC | Age: 34
End: 2019-03-27

## 2019-03-27 NOTE — LETTER
March 27, 2019     [address] [facility] [address]      Dear   Marilia Show: We have attempted to reach you regarding your results  We ask that you please contact our office upon receipt of this letter to receive your results  Thank you in advance for your cooperation and assistance          Sincerely,   [facility] Staff  [phone]

## 2019-03-27 NOTE — TELEPHONE ENCOUNTER
----- Message from Merline Ignacio MD sent at 3/25/2019  1:12 PM EDT -----  Please inform the patient that his blood tests are notable for mild elevation of inflammatory marker, given its minimal elevation,will hold off on colonoscopy for now    Will wait for the stool test

## 2019-03-28 ENCOUNTER — REMOTE DEVICE CLINIC VISIT (OUTPATIENT)
Dept: CARDIOLOGY CLINIC | Facility: CLINIC | Age: 34
End: 2019-03-28
Payer: COMMERCIAL

## 2019-03-28 DIAGNOSIS — Z95.818 PRESENCE OF OTHER CARDIAC IMPLANTS AND GRAFTS: ICD-10-CM

## 2019-03-28 DIAGNOSIS — Z86.73 PERSONAL HISTORY OF TRANSIENT ISCHEMIC ATTACK: Primary | ICD-10-CM

## 2019-03-28 LAB — O+P STL CONC: NORMAL

## 2019-03-28 PROCEDURE — 93299 PR REM INTERROG ICPMS/SCRMS <30 D TECH REVIEW: CPT | Performed by: INTERNAL MEDICINE

## 2019-03-28 PROCEDURE — 93298 REM INTERROG DEV EVAL SCRMS: CPT | Performed by: INTERNAL MEDICINE

## 2019-03-28 NOTE — PROGRESS NOTES
MDT LOOP  CARELINK TRANSMISSION: LOOP RECORDER  PRESENTING RHYTHM  NSR @ 75 BPM  BATTERY STATUS "OK"  NO PATIENT OR DEVICE ACTIVATED EPISODES  NORMAL DEVICE FUNCTION   DL

## 2019-04-03 ENCOUNTER — TELEPHONE (OUTPATIENT)
Dept: INTERNAL MEDICINE CLINIC | Facility: CLINIC | Age: 34
End: 2019-04-03

## 2019-04-03 DIAGNOSIS — E10.21 TYPE 1 DIABETES MELLITUS WITH DIABETIC NEPHROPATHY, WITH LONG-TERM CURRENT USE OF INSULIN (HCC): Primary | Chronic | ICD-10-CM

## 2019-04-05 RX ORDER — HYDRALAZINE HYDROCHLORIDE 50 MG/1
TABLET, FILM COATED ORAL
Refills: 3 | COMMUNITY
Start: 2019-03-29 | End: 2019-04-09 | Stop reason: SDUPTHER

## 2019-04-09 ENCOUNTER — OFFICE VISIT (OUTPATIENT)
Dept: MULTI SPECIALTY CLINIC | Facility: CLINIC | Age: 34
End: 2019-04-09

## 2019-04-09 ENCOUNTER — TELEPHONE (OUTPATIENT)
Dept: MULTI SPECIALTY CLINIC | Facility: CLINIC | Age: 34
End: 2019-04-09

## 2019-04-09 VITALS
BODY MASS INDEX: 29.26 KG/M2 | TEMPERATURE: 98 F | WEIGHT: 209 LBS | DIASTOLIC BLOOD PRESSURE: 98 MMHG | HEART RATE: 72 BPM | SYSTOLIC BLOOD PRESSURE: 170 MMHG | HEIGHT: 71 IN

## 2019-04-09 DIAGNOSIS — N18.6 END-STAGE RENAL DISEASE ON PERITONEAL DIALYSIS (HCC): ICD-10-CM

## 2019-04-09 DIAGNOSIS — Z99.2 END-STAGE RENAL DISEASE ON PERITONEAL DIALYSIS (HCC): ICD-10-CM

## 2019-04-09 DIAGNOSIS — E78.5 DYSLIPIDEMIA: ICD-10-CM

## 2019-04-09 DIAGNOSIS — I15.0 RENOVASCULAR HYPERTENSION: ICD-10-CM

## 2019-04-09 DIAGNOSIS — E10.21 TYPE 1 DIABETES MELLITUS WITH DIABETIC NEPHROPATHY, WITH LONG-TERM CURRENT USE OF INSULIN (HCC): Primary | Chronic | ICD-10-CM

## 2019-04-09 PROCEDURE — 99214 OFFICE O/P EST MOD 30 MIN: CPT | Performed by: PHYSICIAN ASSISTANT

## 2019-04-09 RX ORDER — FOLIC ACID 1 MG/1
1 TABLET ORAL
Status: ON HOLD | COMMUNITY
End: 2019-04-18

## 2019-04-09 RX ORDER — ERGOCALCIFEROL (VITAMIN D2) 1250 MCG
2000 CAPSULE ORAL DAILY
Status: ON HOLD | COMMUNITY
End: 2020-02-20 | Stop reason: CLARIF

## 2019-04-09 RX ORDER — FUROSEMIDE 20 MG/1
20 TABLET ORAL
Status: ON HOLD | COMMUNITY
End: 2019-04-18

## 2019-04-09 RX ORDER — CINACALCET 30 MG/1
TABLET, FILM COATED ORAL
Refills: 10 | COMMUNITY
Start: 2019-04-05 | End: 2019-04-09 | Stop reason: SDUPTHER

## 2019-04-09 RX ORDER — SODIUM CHLORIDE 1000 MG
10 TABLET, SOLUBLE MISCELLANEOUS
Status: ON HOLD | COMMUNITY
End: 2019-04-18

## 2019-04-17 ENCOUNTER — ANESTHESIA EVENT (OUTPATIENT)
Dept: GASTROENTEROLOGY | Facility: HOSPITAL | Age: 34
End: 2019-04-17
Payer: COMMERCIAL

## 2019-04-17 RX ORDER — SODIUM CHLORIDE, SODIUM LACTATE, POTASSIUM CHLORIDE, CALCIUM CHLORIDE 600; 310; 30; 20 MG/100ML; MG/100ML; MG/100ML; MG/100ML
125 INJECTION, SOLUTION INTRAVENOUS CONTINUOUS
Status: CANCELLED | OUTPATIENT
Start: 2019-04-17

## 2019-04-18 ENCOUNTER — HOSPITAL ENCOUNTER (OUTPATIENT)
Facility: HOSPITAL | Age: 34
Setting detail: OUTPATIENT SURGERY
Discharge: HOME | End: 2019-04-18
Attending: INTERNAL MEDICINE | Admitting: INTERNAL MEDICINE
Payer: COMMERCIAL

## 2019-04-18 ENCOUNTER — ANESTHESIA (OUTPATIENT)
Dept: GASTROENTEROLOGY | Facility: HOSPITAL | Age: 34
End: 2019-04-18
Payer: COMMERCIAL

## 2019-04-18 VITALS
RESPIRATION RATE: 18 BRPM | BODY MASS INDEX: 28.56 KG/M2 | HEART RATE: 80 BPM | DIASTOLIC BLOOD PRESSURE: 88 MMHG | OXYGEN SATURATION: 96 % | SYSTOLIC BLOOD PRESSURE: 173 MMHG | HEIGHT: 71 IN | TEMPERATURE: 98.4 F | WEIGHT: 204 LBS

## 2019-04-18 DIAGNOSIS — R19.7 DIARRHEA, UNSPECIFIED TYPE: ICD-10-CM

## 2019-04-18 DIAGNOSIS — R11.2 NAUSEA AND VOMITING, INTRACTABILITY OF VOMITING NOT SPECIFIED, UNSPECIFIED VOMITING TYPE: ICD-10-CM

## 2019-04-18 DIAGNOSIS — D64.9 ANEMIA, UNSPECIFIED TYPE: ICD-10-CM

## 2019-04-18 DIAGNOSIS — K20.90 ESOPHAGITIS: Primary | ICD-10-CM

## 2019-04-18 PROCEDURE — 43239 EGD BIOPSY SINGLE/MULTIPLE: CPT | Performed by: INTERNAL MEDICINE

## 2019-04-18 PROCEDURE — 88305 TISSUE EXAM BY PATHOLOGIST: CPT | Performed by: PATHOLOGY

## 2019-04-18 PROCEDURE — NC001 PR NO CHARGE: Performed by: INTERNAL MEDICINE

## 2019-04-18 RX ORDER — RANITIDINE 150 MG/1
150 TABLET ORAL
Qty: 30 TABLET | Refills: 2 | Status: SHIPPED | OUTPATIENT
Start: 2019-04-18 | End: 2019-07-12 | Stop reason: SDUPTHER

## 2019-04-18 RX ORDER — PROPOFOL 10 MG/ML
INJECTION, EMULSION INTRAVENOUS AS NEEDED
Status: DISCONTINUED | OUTPATIENT
Start: 2019-04-18 | End: 2019-04-18 | Stop reason: SURG

## 2019-04-18 RX ORDER — ZINC OXIDE 13 %
1 CREAM (GRAM) TOPICAL
COMMUNITY
End: 2020-10-26 | Stop reason: HOSPADM

## 2019-04-18 RX ORDER — SODIUM CHLORIDE 9 MG/ML
INJECTION, SOLUTION INTRAVENOUS CONTINUOUS PRN
Status: DISCONTINUED | OUTPATIENT
Start: 2019-04-18 | End: 2019-04-18 | Stop reason: SURG

## 2019-04-18 RX ORDER — LIDOCAINE HYDROCHLORIDE 10 MG/ML
INJECTION, SOLUTION INFILTRATION; PERINEURAL AS NEEDED
Status: DISCONTINUED | OUTPATIENT
Start: 2019-04-18 | End: 2019-04-18 | Stop reason: SURG

## 2019-04-18 RX ORDER — GLYCOPYRROLATE 0.2 MG/ML
INJECTION INTRAMUSCULAR; INTRAVENOUS AS NEEDED
Status: DISCONTINUED | OUTPATIENT
Start: 2019-04-18 | End: 2019-04-18 | Stop reason: SURG

## 2019-04-18 RX ADMIN — PROPOFOL 100 MG: 10 INJECTION, EMULSION INTRAVENOUS at 08:10

## 2019-04-18 RX ADMIN — GLYCOPYRROLATE 0.2 MG: 0.2 INJECTION, SOLUTION INTRAMUSCULAR; INTRAVENOUS at 08:10

## 2019-04-18 RX ADMIN — SODIUM CHLORIDE: 0.9 INJECTION, SOLUTION INTRAVENOUS at 07:28

## 2019-04-18 RX ADMIN — LIDOCAINE HYDROCHLORIDE ANHYDROUS 30 MG: 10 INJECTION, SOLUTION INFILTRATION at 08:10

## 2019-04-18 RX ADMIN — PROPOFOL 50 MG: 10 INJECTION, EMULSION INTRAVENOUS at 08:12

## 2019-04-18 RX ADMIN — PROPOFOL 50 MG: 10 INJECTION, EMULSION INTRAVENOUS at 08:13

## 2019-04-29 DIAGNOSIS — R11.2 NAUSEA AND VOMITING, INTRACTABILITY OF VOMITING NOT SPECIFIED, UNSPECIFIED VOMITING TYPE: Primary | ICD-10-CM

## 2019-04-30 ENCOUNTER — TELEPHONE (OUTPATIENT)
Dept: GASTROENTEROLOGY | Facility: CLINIC | Age: 34
End: 2019-04-30

## 2019-05-03 ENCOUNTER — TELEPHONE (OUTPATIENT)
Dept: NEUROLOGY | Facility: CLINIC | Age: 34
End: 2019-05-03

## 2019-05-15 LAB
LEFT EYE DIABETIC RETINOPATHY: NORMAL
RIGHT EYE DIABETIC RETINOPATHY: NORMAL

## 2019-05-15 PROCEDURE — 2022F DILAT RTA XM EVC RTNOPTHY: CPT | Performed by: NURSE PRACTITIONER

## 2019-05-21 ENCOUNTER — OFFICE VISIT (OUTPATIENT)
Dept: INTERNAL MEDICINE CLINIC | Facility: CLINIC | Age: 34
End: 2019-05-21
Payer: COMMERCIAL

## 2019-05-21 VITALS
HEART RATE: 73 BPM | RESPIRATION RATE: 16 BRPM | TEMPERATURE: 98 F | SYSTOLIC BLOOD PRESSURE: 142 MMHG | WEIGHT: 207 LBS | DIASTOLIC BLOOD PRESSURE: 94 MMHG | HEIGHT: 71 IN | OXYGEN SATURATION: 96 % | BODY MASS INDEX: 28.98 KG/M2

## 2019-05-21 DIAGNOSIS — J30.89 ENVIRONMENTAL AND SEASONAL ALLERGIES: ICD-10-CM

## 2019-05-21 DIAGNOSIS — H66.90 ACUTE OTITIS MEDIA, UNSPECIFIED OTITIS MEDIA TYPE: Primary | ICD-10-CM

## 2019-05-21 PROCEDURE — 3725F SCREEN DEPRESSION PERFORMED: CPT | Performed by: NURSE PRACTITIONER

## 2019-05-21 PROCEDURE — 99214 OFFICE O/P EST MOD 30 MIN: CPT | Performed by: NURSE PRACTITIONER

## 2019-05-21 RX ORDER — AMOXICILLIN 500 MG/1
500 CAPSULE ORAL EVERY 8 HOURS SCHEDULED
Qty: 30 CAPSULE | Refills: 0 | Status: SHIPPED | OUTPATIENT
Start: 2019-05-21 | End: 2019-05-31

## 2019-05-21 RX ORDER — LISINOPRIL 40 MG/1
40 TABLET ORAL DAILY
COMMUNITY
End: 2020-10-26 | Stop reason: HOSPADM

## 2019-05-21 RX ORDER — ASPIRIN 81 MG/1
81 TABLET, CHEWABLE ORAL DAILY
COMMUNITY
End: 2021-05-03

## 2019-05-21 RX ORDER — AZELASTINE 1 MG/ML
1 SPRAY, METERED NASAL 2 TIMES DAILY
Qty: 1 BOTTLE | Refills: 0 | Status: SHIPPED | OUTPATIENT
Start: 2019-05-21 | End: 2019-09-06

## 2019-05-24 ENCOUNTER — HOSPITAL ENCOUNTER (EMERGENCY)
Facility: HOSPITAL | Age: 34
Discharge: HOME | End: 2019-05-24
Attending: EMERGENCY MEDICINE | Admitting: EMERGENCY MEDICINE
Payer: COMMERCIAL

## 2019-05-24 VITALS
WEIGHT: 205 LBS | RESPIRATION RATE: 16 BRPM | HEART RATE: 84 BPM | HEIGHT: 71 IN | SYSTOLIC BLOOD PRESSURE: 178 MMHG | TEMPERATURE: 98.3 F | DIASTOLIC BLOOD PRESSURE: 84 MMHG | OXYGEN SATURATION: 94 % | BODY MASS INDEX: 28.7 KG/M2

## 2019-05-24 DIAGNOSIS — R11.0 NAUSEA: Primary | ICD-10-CM

## 2019-05-24 DIAGNOSIS — H66.93 BILATERAL OTITIS MEDIA: ICD-10-CM

## 2019-05-24 DIAGNOSIS — R73.9 HYPERGLYCEMIA: ICD-10-CM

## 2019-05-24 LAB
ALBUMIN SERPL BCP-MCNC: 2.8 G/DL (ref 3.5–5)
ALP SERPL-CCNC: 80 U/L (ref 46–116)
ALT SERPL W P-5'-P-CCNC: 19 U/L (ref 12–78)
ANION GAP SERPL CALCULATED.3IONS-SCNC: 15 MMOL/L (ref 4–13)
AST SERPL W P-5'-P-CCNC: 19 U/L (ref 5–45)
BASOPHILS # BLD AUTO: 0.05 THOUSANDS/ΜL (ref 0–0.1)
BASOPHILS NFR BLD AUTO: 1 % (ref 0–1)
BETA-HYDROXYBUTYRATE: 1.7 MMOL/L
BILIRUB SERPL-MCNC: 0.7 MG/DL (ref 0.2–1)
BUN SERPL-MCNC: 66 MG/DL (ref 5–25)
CALCIUM SERPL-MCNC: 9.3 MG/DL (ref 8.3–10.1)
CHLORIDE SERPL-SCNC: 100 MMOL/L (ref 100–108)
CO2 SERPL-SCNC: 24 MMOL/L (ref 21–32)
CREAT SERPL-MCNC: 11.8 MG/DL (ref 0.6–1.3)
EOSINOPHIL # BLD AUTO: 0.26 THOUSAND/ΜL (ref 0–0.61)
EOSINOPHIL NFR BLD AUTO: 5 % (ref 0–6)
ERYTHROCYTE [DISTWIDTH] IN BLOOD BY AUTOMATED COUNT: 14.8 % (ref 11.6–15.1)
GFR SERPL CREATININE-BSD FRML MDRD: 5 ML/MIN/1.73SQ M
GLUCOSE SERPL-MCNC: 255 MG/DL (ref 65–140)
GLUCOSE SERPL-MCNC: 264 MG/DL (ref 65–140)
HCT VFR BLD AUTO: 28.4 % (ref 36.5–49.3)
HGB BLD-MCNC: 9.5 G/DL (ref 12–17)
IMM GRANULOCYTES # BLD AUTO: 0.03 THOUSAND/UL (ref 0–0.2)
IMM GRANULOCYTES NFR BLD AUTO: 1 % (ref 0–2)
LYMPHOCYTES # BLD AUTO: 1.09 THOUSANDS/ΜL (ref 0.6–4.47)
LYMPHOCYTES NFR BLD AUTO: 19 % (ref 14–44)
MCH RBC QN AUTO: 31.3 PG (ref 26.8–34.3)
MCHC RBC AUTO-ENTMCNC: 33.5 G/DL (ref 31.4–37.4)
MCV RBC AUTO: 93 FL (ref 82–98)
MONOCYTES # BLD AUTO: 0.39 THOUSAND/ΜL (ref 0.17–1.22)
MONOCYTES NFR BLD AUTO: 7 % (ref 4–12)
NEUTROPHILS # BLD AUTO: 3.95 THOUSANDS/ΜL (ref 1.85–7.62)
NEUTS SEG NFR BLD AUTO: 67 % (ref 43–75)
NRBC BLD AUTO-RTO: 0 /100 WBCS
PLATELET # BLD AUTO: 234 THOUSANDS/UL (ref 149–390)
PMV BLD AUTO: 9.7 FL (ref 8.9–12.7)
POTASSIUM SERPL-SCNC: 4.5 MMOL/L (ref 3.5–5.3)
PROT SERPL-MCNC: 5.9 G/DL (ref 6.4–8.2)
RBC # BLD AUTO: 3.04 MILLION/UL (ref 3.88–5.62)
SODIUM SERPL-SCNC: 139 MMOL/L (ref 136–145)
WBC # BLD AUTO: 5.77 THOUSAND/UL (ref 4.31–10.16)

## 2019-05-24 PROCEDURE — 80053 COMPREHEN METABOLIC PANEL: CPT | Performed by: EMERGENCY MEDICINE

## 2019-05-24 PROCEDURE — 36415 COLL VENOUS BLD VENIPUNCTURE: CPT | Performed by: EMERGENCY MEDICINE

## 2019-05-24 PROCEDURE — 96374 THER/PROPH/DIAG INJ IV PUSH: CPT

## 2019-05-24 PROCEDURE — 82948 REAGENT STRIP/BLOOD GLUCOSE: CPT

## 2019-05-24 PROCEDURE — 99284 EMERGENCY DEPT VISIT MOD MDM: CPT | Performed by: EMERGENCY MEDICINE

## 2019-05-24 PROCEDURE — 99284 EMERGENCY DEPT VISIT MOD MDM: CPT

## 2019-05-24 PROCEDURE — 85025 COMPLETE CBC W/AUTO DIFF WBC: CPT | Performed by: EMERGENCY MEDICINE

## 2019-05-24 PROCEDURE — 93005 ELECTROCARDIOGRAM TRACING: CPT

## 2019-05-24 PROCEDURE — 82010 KETONE BODYS QUAN: CPT | Performed by: EMERGENCY MEDICINE

## 2019-05-24 RX ORDER — ONDANSETRON 2 MG/ML
4 INJECTION INTRAMUSCULAR; INTRAVENOUS ONCE
Status: COMPLETED | OUTPATIENT
Start: 2019-05-24 | End: 2019-05-24

## 2019-05-24 RX ORDER — AZITHROMYCIN 250 MG/1
500 TABLET, FILM COATED ORAL ONCE
Status: COMPLETED | OUTPATIENT
Start: 2019-05-24 | End: 2019-05-24

## 2019-05-24 RX ORDER — AZITHROMYCIN 250 MG/1
250 TABLET, FILM COATED ORAL DAILY
Qty: 4 TABLET | Refills: 0 | Status: SHIPPED | OUTPATIENT
Start: 2019-05-25 | End: 2019-05-29

## 2019-05-24 RX ADMIN — ONDANSETRON 4 MG: 2 INJECTION INTRAMUSCULAR; INTRAVENOUS at 22:34

## 2019-05-24 RX ADMIN — AZITHROMYCIN 500 MG: 250 TABLET, FILM COATED ORAL at 23:29

## 2019-05-25 LAB
ATRIAL RATE: 88 BPM
P AXIS: 73 DEGREES
PR INTERVAL: 156 MS
QRS AXIS: 66 DEGREES
QRSD INTERVAL: 72 MS
QT INTERVAL: 392 MS
QTC INTERVAL: 474 MS
T WAVE AXIS: 55 DEGREES
VENTRICULAR RATE: 88 BPM

## 2019-05-25 PROCEDURE — 93010 ELECTROCARDIOGRAM REPORT: CPT | Performed by: INTERNAL MEDICINE

## 2019-06-01 ENCOUNTER — OFFICE VISIT (OUTPATIENT)
Dept: URGENT CARE | Age: 34
End: 2019-06-01
Payer: COMMERCIAL

## 2019-06-01 VITALS
WEIGHT: 205 LBS | HEART RATE: 74 BPM | TEMPERATURE: 98 F | RESPIRATION RATE: 20 BRPM | HEIGHT: 70 IN | SYSTOLIC BLOOD PRESSURE: 200 MMHG | OXYGEN SATURATION: 98 % | DIASTOLIC BLOOD PRESSURE: 98 MMHG | BODY MASS INDEX: 29.35 KG/M2

## 2019-06-01 DIAGNOSIS — H60.501 ACUTE OTITIS EXTERNA OF RIGHT EAR, UNSPECIFIED TYPE: Primary | ICD-10-CM

## 2019-06-01 PROCEDURE — 99283 EMERGENCY DEPT VISIT LOW MDM: CPT | Performed by: FAMILY MEDICINE

## 2019-06-01 PROCEDURE — 99213 OFFICE O/P EST LOW 20 MIN: CPT | Performed by: FAMILY MEDICINE

## 2019-06-01 PROCEDURE — G0382 LEV 3 HOSP TYPE B ED VISIT: HCPCS | Performed by: FAMILY MEDICINE

## 2019-06-01 RX ORDER — NEOMYCIN SULFATE, POLYMYXIN B SULFATE AND HYDROCORTISONE 10; 3.5; 1 MG/ML; MG/ML; [USP'U]/ML
4 SUSPENSION/ DROPS AURICULAR (OTIC) 4 TIMES DAILY
Qty: 10 ML | Refills: 0 | Status: SHIPPED | OUTPATIENT
Start: 2019-06-01 | End: 2019-09-06

## 2019-06-05 ENCOUNTER — OFFICE VISIT (OUTPATIENT)
Dept: INTERNAL MEDICINE CLINIC | Facility: CLINIC | Age: 34
End: 2019-06-05
Payer: COMMERCIAL

## 2019-06-05 VITALS
WEIGHT: 209 LBS | HEIGHT: 70 IN | DIASTOLIC BLOOD PRESSURE: 90 MMHG | HEART RATE: 79 BPM | BODY MASS INDEX: 29.92 KG/M2 | SYSTOLIC BLOOD PRESSURE: 160 MMHG | RESPIRATION RATE: 16 BRPM | TEMPERATURE: 98.9 F | OXYGEN SATURATION: 96 %

## 2019-06-05 DIAGNOSIS — H60.501 ACUTE OTITIS EXTERNA OF RIGHT EAR, UNSPECIFIED TYPE: ICD-10-CM

## 2019-06-05 DIAGNOSIS — H60.501 ACUTE OTITIS EXTERNA OF RIGHT EAR, UNSPECIFIED TYPE: Primary | ICD-10-CM

## 2019-06-05 DIAGNOSIS — H92.01 EAR PAIN, RIGHT: Primary | ICD-10-CM

## 2019-06-05 PROCEDURE — 99214 OFFICE O/P EST MOD 30 MIN: CPT | Performed by: NURSE PRACTITIONER

## 2019-06-05 PROCEDURE — 69210 REMOVE IMPACTED EAR WAX UNI: CPT | Performed by: NURSE PRACTITIONER

## 2019-06-05 RX ORDER — CIPROFLOXACIN HYDROCHLORIDE 3.5 MG/ML
SOLUTION/ DROPS TOPICAL
Qty: 5 ML | Refills: 0 | Status: SHIPPED | OUTPATIENT
Start: 2019-06-05 | End: 2019-09-06

## 2019-06-05 RX ORDER — ACETIC ACID 20.65 MG/ML
4 SOLUTION AURICULAR (OTIC) 3 TIMES DAILY
Qty: 15 ML | Refills: 0 | Status: SHIPPED | OUTPATIENT
Start: 2019-06-05 | End: 2019-09-06

## 2019-06-07 ENCOUNTER — TELEPHONE (OUTPATIENT)
Dept: NEPHROLOGY | Facility: CLINIC | Age: 34
End: 2019-06-07

## 2019-06-12 ENCOUNTER — REMOTE DEVICE CLINIC VISIT (OUTPATIENT)
Dept: CARDIOLOGY CLINIC | Facility: CLINIC | Age: 34
End: 2019-06-12
Payer: COMMERCIAL

## 2019-06-12 DIAGNOSIS — Z86.73 PERSONAL HISTORY OF TRANSIENT ISCHEMIC ATTACK: Primary | ICD-10-CM

## 2019-06-12 DIAGNOSIS — Z95.818 PRESENCE OF OTHER CARDIAC IMPLANTS AND GRAFTS: ICD-10-CM

## 2019-06-12 PROCEDURE — 93299 PR REM INTERROG ICPMS/SCRMS <30 D TECH REVIEW: CPT | Performed by: INTERNAL MEDICINE

## 2019-06-12 PROCEDURE — 93298 REM INTERROG DEV EVAL SCRMS: CPT | Performed by: INTERNAL MEDICINE

## 2019-06-13 ENCOUNTER — HOSPITAL ENCOUNTER (OUTPATIENT)
Dept: RADIOLOGY | Facility: HOSPITAL | Age: 34
Discharge: HOME | End: 2019-06-13
Attending: INTERNAL MEDICINE
Payer: COMMERCIAL

## 2019-06-13 DIAGNOSIS — R11.2 NAUSEA AND VOMITING, INTRACTABILITY OF VOMITING NOT SPECIFIED, UNSPECIFIED VOMITING TYPE: ICD-10-CM

## 2019-06-13 PROCEDURE — 78264 GASTRIC EMPTYING IMG STUDY: CPT

## 2019-06-13 PROCEDURE — A9541 TC99M SULFUR COLLOID: HCPCS

## 2019-06-17 ENCOUNTER — TELEPHONE (OUTPATIENT)
Dept: GASTROENTEROLOGY | Facility: AMBULARY SURGERY CENTER | Age: 34
End: 2019-06-17

## 2019-06-25 ENCOUNTER — OFFICE VISIT (OUTPATIENT)
Dept: INTERNAL MEDICINE CLINIC | Facility: CLINIC | Age: 34
End: 2019-06-25
Payer: COMMERCIAL

## 2019-06-25 VITALS
DIASTOLIC BLOOD PRESSURE: 80 MMHG | WEIGHT: 213 LBS | TEMPERATURE: 98.2 F | BODY MASS INDEX: 30.49 KG/M2 | HEART RATE: 74 BPM | RESPIRATION RATE: 18 BRPM | HEIGHT: 70 IN | SYSTOLIC BLOOD PRESSURE: 152 MMHG | OXYGEN SATURATION: 98 %

## 2019-06-25 DIAGNOSIS — T14.8XXA WOUND INFECTION: Primary | ICD-10-CM

## 2019-06-25 DIAGNOSIS — L08.9 WOUND INFECTION: Primary | ICD-10-CM

## 2019-06-25 PROCEDURE — 99214 OFFICE O/P EST MOD 30 MIN: CPT | Performed by: NURSE PRACTITIONER

## 2019-06-25 PROCEDURE — 87070 CULTURE OTHR SPECIMN AEROBIC: CPT | Performed by: NURSE PRACTITIONER

## 2019-06-25 PROCEDURE — 87205 SMEAR GRAM STAIN: CPT | Performed by: NURSE PRACTITIONER

## 2019-06-25 RX ORDER — CEPHALEXIN 500 MG/1
500 CAPSULE ORAL EVERY 6 HOURS SCHEDULED
Qty: 40 CAPSULE | Refills: 0 | Status: SHIPPED | OUTPATIENT
Start: 2019-06-25 | End: 2019-07-05

## 2019-06-26 ENCOUNTER — TELEPHONE (OUTPATIENT)
Dept: INTERNAL MEDICINE CLINIC | Facility: CLINIC | Age: 34
End: 2019-06-26

## 2019-06-28 LAB
BACTERIA WND AEROBE CULT: NO GROWTH
GRAM STN SPEC: NORMAL

## 2019-07-02 ENCOUNTER — OFFICE VISIT (OUTPATIENT)
Dept: INTERNAL MEDICINE CLINIC | Facility: CLINIC | Age: 34
End: 2019-07-02
Payer: COMMERCIAL

## 2019-07-02 VITALS
DIASTOLIC BLOOD PRESSURE: 110 MMHG | HEART RATE: 78 BPM | HEIGHT: 70 IN | OXYGEN SATURATION: 98 % | BODY MASS INDEX: 29.92 KG/M2 | SYSTOLIC BLOOD PRESSURE: 160 MMHG | TEMPERATURE: 98 F | WEIGHT: 209 LBS

## 2019-07-02 DIAGNOSIS — T14.8XXA WOUND INFECTION: Primary | ICD-10-CM

## 2019-07-02 DIAGNOSIS — L08.9 WOUND INFECTION: Primary | ICD-10-CM

## 2019-07-02 DIAGNOSIS — E10.21 TYPE 1 DIABETES MELLITUS WITH DIABETIC NEPHROPATHY, WITH LONG-TERM CURRENT USE OF INSULIN (HCC): Chronic | ICD-10-CM

## 2019-07-02 PROCEDURE — 1036F TOBACCO NON-USER: CPT | Performed by: NURSE PRACTITIONER

## 2019-07-02 PROCEDURE — 3066F NEPHROPATHY DOC TX: CPT | Performed by: NURSE PRACTITIONER

## 2019-07-02 PROCEDURE — 3008F BODY MASS INDEX DOCD: CPT | Performed by: NURSE PRACTITIONER

## 2019-07-02 PROCEDURE — 99213 OFFICE O/P EST LOW 20 MIN: CPT | Performed by: NURSE PRACTITIONER

## 2019-07-02 NOTE — ASSESSMENT & PLAN NOTE
Improvement noted of wounds on lower extremities  Pt to continue with oral antibiotics as prescribed for treatment duration of 14 days  He is to continue with topical steroid and abx as prescribed by dermatology  He is to f/u with them in one month  We discussed management of eczema with avoidance of hot water with bathing and use of emollient cream such as aquaphor

## 2019-07-02 NOTE — PROGRESS NOTES
Assessment/Plan:    Wound infection  Improvement noted of wounds on lower extremities  Pt to continue with oral antibiotics as prescribed for treatment duration of 14 days  He is to continue with topical steroid and abx as prescribed by dermatology  He is to f/u with them in one month  We discussed management of eczema with avoidance of hot water with bathing and use of emollient cream such as aquaphor  Diagnoses and all orders for this visit:    Wound infection  -     mupirocin (BACTROBAN) 2 % ointment; Apply topically 3 (three) times a day    Type 1 diabetes mellitus with diabetic nephropathy, with long-term current use of insulin (HCC)  -     insulin lispro (ADMELOG SOLOSTAR) 100 units/mL injection pen; Inject 9 units with breakfast, 9 units with lunch, 8 units with dinner plus scale of 1 unit for every 25 over 150, 3 times daily with meals          Subjective:      Patient ID: Bradley Lyle is a 26 y o  male  Pt is a 25 y/o male here today for f/u to management of leg wounds on 6/25  PMH of type 1 DM, stroke, HTN, ESRD on peritoneal dialysis  He came in with painful open lesions with purulent drainage noted  Wounds were swabbed and negative for bacterial growth  He was started on cephalexin and advised to use antibiotic ointment on the open lesions  He was also evaluated by dermatology who felt that the wounds were due to scratching 2/2 eczema related to diabetes  They gave him an rx for mupirocin and steroid cream   He has noted significant improvement in the wounds since starting treatment and he denies ongoing pain  He is scheduled to f/u with derm in 1 month  The following portions of the patient's history were reviewed and updated as appropriate: allergies, current medications, past family history, past medical history, past social history, past surgical history and problem list     Review of Systems   Constitutional: Negative for appetite change, chills and fever  Gastrointestinal: Negative for nausea and vomiting  Skin: Positive for wound  Objective:      BP (!) 160/110 (BP Location: Left arm, Patient Position: Sitting, Cuff Size: Adult)   Pulse 78   Temp 98 °F (36 7 °C) (Tympanic)   Ht 5' 10" (1 778 m)   Wt 94 8 kg (209 lb)   SpO2 98%   BMI 29 99 kg/m²          Physical Exam   Constitutional: He is oriented to person, place, and time  Vital signs are normal  He appears well-developed and well-nourished  He is cooperative  Cardiovascular: Normal rate, regular rhythm and normal pulses  Pulmonary/Chest: Effort normal    Neurological: He is alert and oriented to person, place, and time  Skin: Skin is warm and dry  Lesion noted  Scattered wounds on the lower legs in multiple stages of healing  Open lesions are no longer showing sign of purulence and the wound beds have pink granulation tissue  Other lesions are scabbed and healing well  Surrounding skin is no longer swollen and erythematous  Psychiatric: He has a normal mood and affect  His speech is normal and behavior is normal  Judgment and thought content normal  Cognition and memory are normal    Vitals reviewed

## 2019-07-05 DIAGNOSIS — E10.21 TYPE 1 DIABETES MELLITUS WITH DIABETIC NEPHROPATHY, WITH LONG-TERM CURRENT USE OF INSULIN (HCC): Chronic | ICD-10-CM

## 2019-07-05 RX ORDER — INSULIN LISPRO 100 U/ML
INJECTION, SOLUTION SUBCUTANEOUS
Qty: 3 ML | Refills: 1 | Status: ON HOLD | OUTPATIENT
Start: 2019-07-05 | End: 2020-06-23 | Stop reason: SDUPTHER

## 2019-07-12 ENCOUNTER — TELEPHONE (OUTPATIENT)
Dept: GASTROENTEROLOGY | Facility: MEDICAL CENTER | Age: 34
End: 2019-07-12

## 2019-07-12 DIAGNOSIS — K20.90 ESOPHAGITIS: ICD-10-CM

## 2019-07-12 RX ORDER — RANITIDINE 150 MG/1
150 TABLET ORAL
Qty: 30 TABLET | Refills: 2 | Status: SHIPPED | OUTPATIENT
Start: 2019-07-12 | End: 2019-10-10 | Stop reason: SDUPTHER

## 2019-07-12 NOTE — TELEPHONE ENCOUNTER
Refill Request for Medication: ranitidine    Dose: 150    Quantity: 30    Pharmacy: [facility]

## 2019-07-20 ENCOUNTER — APPOINTMENT (OUTPATIENT)
Dept: LAB | Facility: CLINIC | Age: 34
End: 2019-07-20
Payer: COMMERCIAL

## 2019-07-20 DIAGNOSIS — E10.21 TYPE 1 DIABETES MELLITUS WITH DIABETIC NEPHROPATHY, WITH LONG-TERM CURRENT USE OF INSULIN (HCC): ICD-10-CM

## 2019-07-20 DIAGNOSIS — Z99.2 END-STAGE RENAL DISEASE ON PERITONEAL DIALYSIS (HCC): ICD-10-CM

## 2019-07-20 DIAGNOSIS — N18.6 END-STAGE RENAL DISEASE ON PERITONEAL DIALYSIS (HCC): ICD-10-CM

## 2019-07-20 DIAGNOSIS — I15.0 RENOVASCULAR HYPERTENSION: ICD-10-CM

## 2019-07-20 LAB
ANION GAP SERPL CALCULATED.3IONS-SCNC: 12 MMOL/L (ref 4–13)
BUN SERPL-MCNC: 57 MG/DL (ref 5–25)
CALCIUM SERPL-MCNC: 9.7 MG/DL (ref 8.3–10.1)
CHLORIDE SERPL-SCNC: 100 MMOL/L (ref 100–108)
CO2 SERPL-SCNC: 26 MMOL/L (ref 21–32)
CREAT SERPL-MCNC: 11.56 MG/DL (ref 0.6–1.3)
GFR SERPL CREATININE-BSD FRML MDRD: 5 ML/MIN/1.73SQ M
GLUCOSE SERPL-MCNC: 86 MG/DL (ref 65–140)
POTASSIUM SERPL-SCNC: 5.3 MMOL/L (ref 3.5–5.3)
SODIUM SERPL-SCNC: 138 MMOL/L (ref 136–145)
TSH SERPL DL<=0.05 MIU/L-ACNC: 4.16 UIU/ML (ref 0.36–3.74)

## 2019-07-20 PROCEDURE — 84439 ASSAY OF FREE THYROXINE: CPT

## 2019-07-20 PROCEDURE — 80048 BASIC METABOLIC PNL TOTAL CA: CPT

## 2019-07-20 PROCEDURE — 36415 COLL VENOUS BLD VENIPUNCTURE: CPT

## 2019-07-20 PROCEDURE — 84443 ASSAY THYROID STIM HORMONE: CPT

## 2019-07-21 LAB — T4 FREE SERPL-MCNC: 0.77 NG/DL (ref 0.76–1.46)

## 2019-08-05 DIAGNOSIS — H53.30 BINOCULAR VISUAL DISTURBANCE: ICD-10-CM

## 2019-08-05 DIAGNOSIS — N17.0 ACUTE RENAL FAILURE WITH ACUTE TUBULAR NECROSIS SUPERIMPOSED ON STAGE 2 CHRONIC KIDNEY DISEASE (HCC): ICD-10-CM

## 2019-08-05 DIAGNOSIS — H51.0 BINOCULAR VISION DISORDER WITH CONJUGATE GAZE PALSY: ICD-10-CM

## 2019-08-05 DIAGNOSIS — E72.11 HYPERHOMOCYSTEINEMIA (HCC): ICD-10-CM

## 2019-08-05 DIAGNOSIS — E83.39 HYPERPHOSPHATEMIA: ICD-10-CM

## 2019-08-05 DIAGNOSIS — N18.2 ACUTE RENAL FAILURE WITH ACUTE TUBULAR NECROSIS SUPERIMPOSED ON STAGE 2 CHRONIC KIDNEY DISEASE (HCC): ICD-10-CM

## 2019-08-05 DIAGNOSIS — E10.21 TYPE 1 DIABETES MELLITUS WITH DIABETIC NEPHROPATHY, WITH LONG-TERM CURRENT USE OF INSULIN (HCC): Chronic | ICD-10-CM

## 2019-08-05 DIAGNOSIS — H46.9 OPTIC NEURITIS DUE TO MULTIPLE SCLEROSIS (HCC): ICD-10-CM

## 2019-08-05 DIAGNOSIS — R79.89 AZOTEMIA: ICD-10-CM

## 2019-08-05 DIAGNOSIS — I16.0 HYPERTENSIVE URGENCY: ICD-10-CM

## 2019-08-05 DIAGNOSIS — R11.0 NAUSEA: ICD-10-CM

## 2019-08-05 DIAGNOSIS — F33.9 EPISODE OF RECURRENT MAJOR DEPRESSIVE DISORDER, UNSPECIFIED DEPRESSION EPISODE SEVERITY (HCC): ICD-10-CM

## 2019-08-05 DIAGNOSIS — Z86.73 HISTORY OF LACUNAR CEREBROVASCULAR ACCIDENT (CVA): ICD-10-CM

## 2019-08-05 DIAGNOSIS — N18.30 ACUTE RENAL FAILURE WITH ACUTE TUBULAR NECROSIS SUPERIMPOSED ON STAGE 3 CHRONIC KIDNEY DISEASE (HCC): ICD-10-CM

## 2019-08-05 DIAGNOSIS — E55.9 VITAMIN D DEFICIENCY: ICD-10-CM

## 2019-08-05 DIAGNOSIS — G35 OPTIC NEURITIS DUE TO MULTIPLE SCLEROSIS (HCC): ICD-10-CM

## 2019-08-05 DIAGNOSIS — I63.9 CEREBROVASCULAR ACCIDENT (CVA) OF LEFT PONTINE STRUCTURE (HCC): ICD-10-CM

## 2019-08-05 DIAGNOSIS — H53.8 BLURRED VISION: ICD-10-CM

## 2019-08-05 DIAGNOSIS — R80.1 PERSISTENT PROTEINURIA: ICD-10-CM

## 2019-08-05 DIAGNOSIS — N17.0 ACUTE RENAL FAILURE WITH ACUTE TUBULAR NECROSIS SUPERIMPOSED ON STAGE 3 CHRONIC KIDNEY DISEASE (HCC): ICD-10-CM

## 2019-08-05 DIAGNOSIS — I63.9 CEREBROVASCULAR ACCIDENT (CVA), UNSPECIFIED MECHANISM (HCC): ICD-10-CM

## 2019-08-06 RX ORDER — ESCITALOPRAM OXALATE 10 MG/1
10 TABLET ORAL DAILY
Qty: 90 TABLET | Refills: 2 | Status: SHIPPED | OUTPATIENT
Start: 2019-08-06 | End: 2020-01-02 | Stop reason: SDUPTHER

## 2019-08-06 RX ORDER — ATORVASTATIN CALCIUM 40 MG/1
40 TABLET, FILM COATED ORAL EVERY EVENING
Qty: 90 TABLET | Refills: 1 | Status: SHIPPED | OUTPATIENT
Start: 2019-08-06 | End: 2020-02-20 | Stop reason: SDUPTHER

## 2019-08-07 ENCOUNTER — HOSPITAL ENCOUNTER (EMERGENCY)
Facility: HOSPITAL | Age: 34
Discharge: HOME | End: 2019-08-07
Attending: EMERGENCY MEDICINE
Payer: COMMERCIAL

## 2019-08-07 VITALS
TEMPERATURE: 99 F | OXYGEN SATURATION: 98 % | RESPIRATION RATE: 18 BRPM | DIASTOLIC BLOOD PRESSURE: 93 MMHG | HEART RATE: 76 BPM | SYSTOLIC BLOOD PRESSURE: 189 MMHG

## 2019-08-07 DIAGNOSIS — S30.811A ABRASION OF ABDOMINAL WALL, INITIAL ENCOUNTER: Primary | ICD-10-CM

## 2019-08-07 PROCEDURE — 99282 EMERGENCY DEPT VISIT SF MDM: CPT

## 2019-08-07 PROCEDURE — 99282 EMERGENCY DEPT VISIT SF MDM: CPT | Performed by: EMERGENCY MEDICINE

## 2019-08-07 NOTE — ED PROVIDER NOTES
History  Chief Complaint   Patient presents with    Skin Problem     Pt reports having a scab on his lower abdomen from a previous dressing near hi PD cath that "wont stop bleedng " Pt bandage applied at time of triade and red blood noticeable through gauze  Pt takes 81mg of ASA daily  27 yo male with h/o type 1 DM, stroke, HTN, ESRD on peritoneal dialysis presents with superficial scratch on abdominal wall with persistent bleeding  Denies other abnormal bleeding or bruising, denies fever, cloudy PD fluid, or other issues  Denies associated pain/redness/swelling  Has been having diffuse itching thought to be related to diabetic exczema as well as elevated phosphorus  Pt has appointment to have labs drawn in 48 hours  History provided by:  Patient  Laceration   Location:  Torso  Torso laceration location:  Abdomen  Depth:  Cutaneous  Quality: avulsion    Time since incident:  1 day  Pain details:     Severity:  No pain    Timing:  Constant  Foreign body present:  No foreign bodies  Relieved by:  Nothing  Worsened by:  Nothing  Ineffective treatments:  Pressure  Tetanus status:  Unknown  Associated symptoms: no fever, no redness, no swelling and no streaking        Prior to Admission Medications   Prescriptions Last Dose Informant Patient Reported? Taking?    ADMELOG SOLOSTAR 100 units/mL injection pen   No No   Sig: INJECT 10 UNITS WITH A SLIDING SCALE OF 1 UNIT FOR EVERY 25 OVER 150 SUBCUTANEOUSLY THREE TIMES DAILY WITH MEALS   Cholecalciferol (VITAMIN D) 2000 units CAPS  Mother Yes No   Sig: Take 1 capsule by mouth daily   Melatonin 10 MG TABS  Self Yes No   Sig: Take 3 mg by mouth daily at bedtime    NIFEdipine ER (ADALAT CC) 60 MG 24 hr tablet  Mother No No   Sig: Take 1 tablet (60 mg total) by mouth daily   Patient taking differently: Take 60 mg by mouth 2 (two) times a day     Probiotic Product (PROBIOTIC DAILY) CAPS  Self Yes No   Sig: Take 1 capsule by mouth daily   Sucroferric Oxyhydroxide (VELPHORO PO)   Yes No   Sig: Velphoro   acetic acid (VOSOL) 2 % otic solution   No No   Sig: Administer 4 drops to the right ear 3 (three) times a day   aspirin 81 mg chewable tablet  Mother Yes No   Sig: Chew 81 mg daily   atorvastatin (LIPITOR) 40 mg tablet   No No   Sig: Take 1 tablet (40 mg total) by mouth every evening   azelastine (ASTELIN) 0 1 % nasal spray   No No   Si spray into each nostril 2 (two) times a day Use in each nostril as directed   b complex vitamins capsule  Mother Yes No   Sig: Take 1 capsule by mouth daily   calcium acetate (PHOSLO) 667 mg capsule  Mother No No   Sig: Take 1 capsule (667 mg total) by mouth 3 (three) times a day with meals   cinacalcet (SENSIPAR) 30 mg tablet  Self Yes No   Sig: Take 60 mg by mouth    ciprofloxacin (CILOXAN) 0 3 % ophthalmic solution   No No   Sig: Instill 1 drip in the right ear twice daily x 7 days     cloNIDine (CATAPRES) 0 1 mg tablet   Yes No   Sig: Take 0 1 mg by mouth 3 (three) times a day    ergocalciferol (ERGOCALCIFEROL) 02322 units capsule   Yes No   Sig: Take 50,000 Units by mouth every 30 (thirty) days    escitalopram (LEXAPRO) 10 mg tablet   No No   Sig: Take 1 tablet (10 mg total) by mouth daily   gentamicin (GARAMYCIN) 0 1 % cream   Yes No   hydrALAZINE (APRESOLINE) 100 MG tablet  Mother No No   Sig: Take 0 5 tablets (50 mg total) by mouth 3 (three) times a day   Patient taking differently: Take 100 mg by mouth 3 (three) times a day 100 mg twice daily   insulin glargine (BASAGLAR KWIKPEN) 100 units/mL injection pen   No No   Sig: Inject 12u in AM and 15u in PM   insulin lispro (ADMELOG SOLOSTAR) 100 units/mL injection pen   No No   Sig: Inject 9 units with breakfast, 9 units with lunch, 8 units with dinner plus scale of 1 unit for every 25 over 150, 3 times daily with meals   labetalol (NORMODYNE) 300 mg tablet  Mother No No   Sig: Take 0 5 tablets (150 mg total) by mouth every 8 (eight) hours   Patient taking differently: Take 300 mg by mouth 3 (three) times a day     lisinopril (ZESTRIL) 40 mg tablet  Mother Yes No   Sig: Take 40 mg by mouth daily   mupirocin (BACTROBAN) 2 % ointment   No No   Sig: Apply topically 3 (three) times a day   neomycin-polymyxin-hydrocortisone (CORTISPORIN) 0 35%-10,000 units/mL-1% otic suspension   No No   Sig: Administer 4 drops to the right ear 4 (four) times a day   ondansetron (ZOFRAN) 4 mg tablet   No No   Sig: Take 1 tablet (4 mg total) by mouth every 8 (eight) hours as needed for nausea or vomiting   Patient taking differently: Take 4 mg by mouth every 8 (eight) hours as needed for nausea or vomiting    ranitidine (ZANTAC) 150 mg tablet   No No   Sig: Take 1 tablet (150 mg total) by mouth daily at bedtime   torsemide (DEMADEX) 20 mg tablet  Mother Yes No   Sig: Take 100 mg by mouth daily 3 tablets for total of 60 mg in AM & 2 tablets for 40 mg in afternoon      Facility-Administered Medications: None       Past Medical History:   Diagnosis Date    Acute kidney injury ([facility] [address] )     Anxiety     Cerebellar stroke side undetermined 2015 2015,1/2018    Diabetes type 1, controlled ([facility] [address] )     IDDM    Diarrhea     History of shingles 2010    History of transfusion 02/2018    Hypertension     Muscle weakness     general unsteadiness    Retinopathy        Past Surgical History:   Procedure Laterality Date    CARDIAC LOOP RECORDER  05/2018    EGD      EYE SURGERY      PERITONEAL CATHETER INSERTION N/A 8/27/2018    Procedure: UNROOF PD CATHETER;  Surgeon: Jose Mancilla DO;  [facility];  Service: General    AK ESOPHAGOGASTRODUODENOSCOPY TRANSORAL DIAGNOSTIC N/A 4/18/2019    Procedure: ESOPHAGOGASTRODUODENOSCOPY (EGD); Surgeon: Cookie Becker MD;  [facility]: [facility];   Service: Gastroenterology    [place] LAP INSERTION TUNNELED INTRAPERITONEAL CATHETER N/A 8/6/2018    Procedure: LAPAROSCOPIC PD CATHETER PLACEMENT;  Surgeon: Jose Mancilla DO;  [facility];  Service: General    TONSILLECTOMY Family History   Problem Relation Age of Onset   Janee Riddle Breast cancer Mother     Hypertension Mother     Hyperlipidemia Father     Hypertension Father     Leukemia Maternal Grandmother     Hyperlipidemia Maternal Grandfather     Hypertension Maternal Grandfather     Hyperlipidemia Paternal Grandmother     Hypertension Paternal Grandmother     Heart disease Paternal Grandfather         cardiac disorder    Diabetes Paternal Grandfather      I have reviewed and agree with the history as documented  Social History     Tobacco Use    Smoking status: Former Smoker     Packs/day: 0 50     Years: 12 00     Pack years: 6 00     Types: Cigarettes     Last attempt to quit: 2018     Years since quittin 5    Smokeless tobacco: Never Used    Tobacco comment: quit 2018   Substance Use Topics    Alcohol use: Not Currently     Comment: rarely    Drug use: Yes     Frequency: 5 0 times per week     Types: Marijuana        Review of Systems   Constitutional: Negative for chills and fever  Respiratory: Negative for cough  Gastrointestinal: Negative for anal bleeding  Genitourinary: Negative for hematuria  Skin: Positive for wound  All other systems reviewed and are negative  Physical Exam  Physical Exam   Constitutional: He appears well-developed and well-nourished  No distress  HENT:   Head: Normocephalic and atraumatic  Eyes: Conjunctivae are normal    Pulmonary/Chest: Effort normal    Abdominal: Soft  PD catheter in place, site clean/dry/intact without surrounding erythema or swelling, adjacent to umbilicus there is a < 1cm superficial abrasion with very minimal oozing, no underlying hematoma, non-tender   Skin: Capillary refill takes less than 2 seconds  He is not diaphoretic  Scattered excoriation to torso and extremities, no petechiae, no vesicles, see abdominal exam for more details   Psychiatric: He has a normal mood and affect   His speech is normal and behavior is normal  Judgment and thought content normal        Vital Signs  ED Triage Vitals [08/07/19 1753]   Temperature Pulse Respirations Blood Pressure SpO2   99 °F (37 2 °C) 76 18 (!) 189/93 98 %      Temp Source Heart Rate Source Patient Position - Orthostatic VS BP Location FiO2 (%)   Oral Monitor Sitting Right arm --      Pain Score       --           Vitals:    08/07/19 1753   BP: (!) 189/93   Pulse: 76   Patient Position - Orthostatic VS: Sitting         Visual Acuity      ED Medications  Medications - No data to display    Diagnostic Studies  Results Reviewed     None                 No orders to display              Procedures  Procedures       ED Course                               MDM  Number of Diagnoses or Management Options  Abrasion of abdominal wall, initial encounter:   Diagnosis management comments: Superficial bleeding from excoriation noted, no systemic symptoms or other signs of abnormal bleeding/bruising, pt is ESRD on PD which puts at risk for plt dysfunction, given the very minimal amount of bleeding, reassuring exam, and lack of other symptoms as well as close f/u with labs, hemostatic agent applied to wound and pt discharged home  RTER precautions discussed with patient and family who endorsed good insight/understanding  Amount and/or Complexity of Data Reviewed  Review and summarize past medical records: yes    Risk of Complications, Morbidity, and/or Mortality  Presenting problems: minimal  Diagnostic procedures: minimal  Management options: minimal    Patient Progress  Patient progress: improved      Disposition  Final diagnoses:   None     ED Disposition     None      Follow-up Information    None         Patient's Medications   Discharge Prescriptions    No medications on file     No discharge procedures on file      ED Provider  Electronically Signed by           Brayan Antunez MD  08/07/19 5891       Brayan Antunez MD  08/07/19 0209

## 2019-08-07 NOTE — DISCHARGE INSTRUCTIONS
You had a hemostatic agent applied to your wound, please leave dressing in place until morning  Avoid irritationg/injury to area  Avoid itching if possible  Keep finger nails trimmed short to avoid further injury  Hemostatic agent should flake off by its self  Keep dry until morning  Return to ER if you noticed redness/swelling/pain or continued bleeding

## 2019-09-05 NOTE — PROGRESS NOTES
Assessment/Plan:    Problem List Items Addressed This Visit        Endocrine    Type 1 diabetes mellitus with diabetic nephropathy, with long-term current use of insulin (HCC) (Chronic)     Last A1c 5 3 at endo office, BS have been labile and there is consideration for insulin pump  Patient to continue discussion with Endo  Relevant Medications    torsemide (DEMADEX) 100 mg tablet    GLUCAGON EMERGENCY 1 MG injection       Cardiovascular and Mediastinum    Renovascular hypertension     BP have improved with dietary modifications and possibly due to medical marijuana use  He has remained on clonidine 0 1mg TID, hydralazine 100mg twice daily, labetalol 300mg TID, nifedipine 60mg twice daily and lisinopril 40mg daily  Relevant Medications    torsemide (DEMADEX) 100 mg tablet       Musculoskeletal and Integument    Pruritus - Primary     Trial of low dose hydroxyzine to minimize patient scratching         Relevant Medications    hydrOXYzine HCL (ATARAX) 10 mg tablet       Other    Obesity (BMI 30 0-34  9)     BMI 31 with comorbidities  Patient encouraged to decrease overall calorie intake any smaller portion sizes  Patient to increase physical activity as tolerated as well  Subjective:      Patient ID: Trev Elizondo is a 37 y o  male  HPI  40-year-old male with DM 1 with nephropathy, ESRD on PD on transplant list, HLD, HTN, MDD, vitamin-D deficiency and h/o cerebellar stroke and L pontine CVA here for follow-up care  He continues to have rash due to scratching on his arms and legs but are healing  Has upcoming visit with Endo, in the evening his BS have been 300s, gives additional insulin and morning sugars still elevated  Reports blood sugars have been stable  Has had sbp 180s but are less fewer  He has had less dizziness and nausea has resolved    He has changed his diet and is on medical marijuana in the evening for the past 6 months as recommended by Transplant team   He uses a vaporizer  The following portions of the patient's history were reviewed and updated as appropriate: allergies, current medications, past family history, past medical history, past social history, past surgical history and problem list     Current Outpatient Medications:     ADMELOG SOLOSTAR 100 units/mL injection pen, INJECT 10 UNITS WITH A SLIDING SCALE OF 1 UNIT FOR EVERY 25 OVER 150 SUBCUTANEOUSLY THREE TIMES DAILY WITH MEALS, Disp: 3 mL, Rfl: 1    aspirin 81 mg chewable tablet, Chew 81 mg daily, Disp: , Rfl:     atorvastatin (LIPITOR) 40 mg tablet, Take 1 tablet (40 mg total) by mouth every evening, Disp: 90 tablet, Rfl: 1    b complex vitamins capsule, Take 1 capsule by mouth daily, Disp: , Rfl:     calcium acetate (PHOSLO) 667 mg capsule, Take 1 capsule (667 mg total) by mouth 3 (three) times a day with meals, Disp: , Rfl: 0    Cholecalciferol (VITAMIN D) 2000 units CAPS, Take 1 capsule by mouth daily, Disp: , Rfl:     cinacalcet (SENSIPAR) 90 MG tablet, Take 90 mg by mouth daily, Disp: , Rfl: 11    cloNIDine (CATAPRES) 0 1 mg tablet, Take 0 1 mg by mouth 3 (three) times a day , Disp: , Rfl:     escitalopram (LEXAPRO) 10 mg tablet, Take 1 tablet (10 mg total) by mouth daily, Disp: 90 tablet, Rfl: 2    gentamicin (GARAMYCIN) 0 1 % cream, , Disp: , Rfl:     GLUCAGON EMERGENCY 1 MG injection, INJECT 1MG SUBCUTANEOUSLY AS NEEDED (AS DIRECTED)   MAY REPEAT DOSE EVERY 20 MINUTES AS NEEDED, Disp: , Rfl: 3    hydrALAZINE (APRESOLINE) 100 MG tablet, Take 0 5 tablets (50 mg total) by mouth 3 (three) times a day (Patient taking differently: Take 100 mg by mouth 3 (three) times a day 100 mg twice daily), Disp: , Rfl:     insulin glargine (BASAGLAR KWIKPEN) 100 units/mL injection pen, Inject 12u in AM and 15u in PM, Disp: 5 pen, Rfl: 0    labetalol (NORMODYNE) 300 mg tablet, Take 0 5 tablets (150 mg total) by mouth every 8 (eight) hours (Patient taking differently: Take 300 mg by mouth 3 (three) times a day  ), Disp: 60 tablet, Rfl: 3    lisinopril (ZESTRIL) 40 mg tablet, Take 40 mg by mouth daily, Disp: , Rfl:     Melatonin 10 MG TABS, Take 3 mg by mouth daily at bedtime , Disp: , Rfl:     mupirocin (BACTROBAN) 2 % ointment, Apply topically 3 (three) times a day, Disp: 22 g, Rfl: 0    NIFEdipine ER (ADALAT CC) 60 MG 24 hr tablet, Take 1 tablet (60 mg total) by mouth daily (Patient taking differently: Take 60 mg by mouth 2 (two) times a day  ), Disp: 180 tablet, Rfl: 0    ondansetron (ZOFRAN) 4 mg tablet, Take 1 tablet (4 mg total) by mouth every 8 (eight) hours as needed for nausea or vomiting (Patient taking differently: Take 4 mg by mouth every 8 (eight) hours as needed for nausea or vomiting ), Disp: 30 tablet, Rfl: 0    Probiotic Product (PROBIOTIC DAILY) CAPS, Take 1 capsule by mouth daily, Disp: , Rfl:     ranitidine (ZANTAC) 150 mg tablet, Take 1 tablet (150 mg total) by mouth daily at bedtime, Disp: 30 tablet, Rfl: 2    torsemide (DEMADEX) 100 mg tablet, Take 100 mg by mouth every morning, Disp: , Rfl: 11    ergocalciferol (ERGOCALCIFEROL) 71299 units capsule, Take 50,000 Units by mouth every 30 (thirty) days , Disp: , Rfl:     hydrOXYzine HCL (ATARAX) 10 mg tablet, Take 1 tablet (10 mg total) by mouth every 6 (six) hours as needed for itching, Disp: 120 tablet, Rfl: 0    Sucroferric Oxyhydroxide (VELPHORO PO), Velphoro, Disp: , Rfl:     Review of Systems   Constitutional: Negative for unexpected weight change  HENT: Negative  Respiratory: Negative  Musculoskeletal: Negative for arthralgias and back pain  Skin: Positive for rash  pruritis   Neurological: Positive for dizziness  Objective:    /80 (BP Location: Left arm, Patient Position: Sitting)   Pulse 80   Temp 98 2 °F (36 8 °C)   Resp 16   Ht 5' 10" (1 778 m)   Wt 100 kg (221 lb 3 2 oz)   SpO2 97%   BMI 31 74 kg/m²      Physical Exam   Constitutional: He appears well-developed  No distress  HENT:   Mouth/Throat: Oropharynx is clear and moist    Neck: Neck supple  Cardiovascular: Normal rate, regular rhythm, normal heart sounds and intact distal pulses  Pulmonary/Chest: Effort normal and breath sounds normal  No stridor  No respiratory distress  Neurological: He is alert  Skin:   Multiple excoriations on bilateral arms and lower legs   Psychiatric: He has a normal mood and affect  Vitals reviewed  BMI Counseling: Body mass index is 31 74 kg/m²  Discussed the patient's BMI with him  The BMI is above average  BMI counseling and education was provided to the patient  Nutrition recommendations include reducing portion sizes, decreasing overall calorie intake and moderation in carbohydrate intake  Exercise recommendations include moderate aerobic physical activity for 150 minutes/week and strength training exercises

## 2019-09-06 ENCOUNTER — OFFICE VISIT (OUTPATIENT)
Dept: INTERNAL MEDICINE CLINIC | Facility: CLINIC | Age: 34
End: 2019-09-06
Payer: COMMERCIAL

## 2019-09-06 VITALS
WEIGHT: 221.2 LBS | OXYGEN SATURATION: 97 % | SYSTOLIC BLOOD PRESSURE: 132 MMHG | HEART RATE: 80 BPM | BODY MASS INDEX: 31.67 KG/M2 | TEMPERATURE: 98.2 F | DIASTOLIC BLOOD PRESSURE: 80 MMHG | RESPIRATION RATE: 16 BRPM | HEIGHT: 70 IN

## 2019-09-06 DIAGNOSIS — I15.0 RENOVASCULAR HYPERTENSION: ICD-10-CM

## 2019-09-06 DIAGNOSIS — E66.9 OBESITY (BMI 30.0-34.9): ICD-10-CM

## 2019-09-06 DIAGNOSIS — L29.9 PRURITUS: Primary | ICD-10-CM

## 2019-09-06 DIAGNOSIS — E10.21 TYPE 1 DIABETES MELLITUS WITH DIABETIC NEPHROPATHY, WITH LONG-TERM CURRENT USE OF INSULIN (HCC): Chronic | ICD-10-CM

## 2019-09-06 PROBLEM — H92.01 EAR PAIN, RIGHT: Status: RESOLVED | Noted: 2019-06-05 | Resolved: 2019-09-06

## 2019-09-06 PROBLEM — T14.8XXA WOUND INFECTION: Status: RESOLVED | Noted: 2019-06-25 | Resolved: 2019-09-06

## 2019-09-06 PROBLEM — H66.90 ACUTE OTITIS MEDIA: Status: RESOLVED | Noted: 2019-05-21 | Resolved: 2019-09-06

## 2019-09-06 PROBLEM — Z01.810 PREOP CARDIOVASCULAR EXAM: Status: RESOLVED | Noted: 2019-03-05 | Resolved: 2019-09-06

## 2019-09-06 PROBLEM — L08.9 WOUND INFECTION: Status: RESOLVED | Noted: 2019-06-25 | Resolved: 2019-09-06

## 2019-09-06 PROBLEM — H60.501 ACUTE OTITIS EXTERNA OF RIGHT EAR: Status: RESOLVED | Noted: 2019-06-01 | Resolved: 2019-09-06

## 2019-09-06 PROCEDURE — 99214 OFFICE O/P EST MOD 30 MIN: CPT | Performed by: INTERNAL MEDICINE

## 2019-09-06 RX ORDER — CINACALCET 90 MG/1
90 TABLET, FILM COATED ORAL DAILY
Refills: 11 | Status: ON HOLD | COMMUNITY
Start: 2019-09-04 | End: 2020-04-21 | Stop reason: SDUPTHER

## 2019-09-06 RX ORDER — TORSEMIDE 100 MG/1
100 TABLET ORAL 2 TIMES DAILY
Refills: 11 | COMMUNITY
Start: 2019-08-30 | End: 2020-11-25 | Stop reason: HOSPADM

## 2019-09-06 RX ORDER — HYDROXYZINE HYDROCHLORIDE 10 MG/1
10 TABLET, FILM COATED ORAL EVERY 6 HOURS PRN
Qty: 120 TABLET | Refills: 0 | Status: SHIPPED | OUTPATIENT
Start: 2019-09-06 | End: 2020-01-02 | Stop reason: SDUPTHER

## 2019-09-06 RX ORDER — IBUPROFEN 600 MG/1
TABLET ORAL
Refills: 3 | COMMUNITY
Start: 2019-07-24

## 2019-09-06 NOTE — PATIENT INSTRUCTIONS
Obesity   AMBULATORY CARE:   Obesity  is when your body mass index (BMI) is greater than 30  Your healthcare provider will use your height and weight to measure your BMI  The risks of obesity include  many health problems, such as injuries or physical disability  You may need tests to check for the following:  · Diabetes     · High blood pressure or high cholesterol     · Heart disease     · Gallbladder or liver disease     · Cancer of the colon, breast, prostate, liver, or kidney     · Sleep apnea     · Arthritis or gout  Seek care immediately if:   · You have a severe headache, confusion, or difficulty speaking  · You have weakness on one side of your body  · You have chest pain, sweating, or shortness of breath  Contact your healthcare provider if:   · You have symptoms of gallbladder or liver disease, such as pain in your upper abdomen  · You have knee or hip pain and discomfort while walking  · You have symptoms of diabetes, such as intense hunger and thirst, and frequent urination  · You have symptoms of sleep apnea, such as snoring or daytime sleepiness  · You have questions or concerns about your condition or care  Treatment for obesity  focuses on helping you lose weight to improve your health  Even a small decrease in BMI can reduce the risk for many health problems  Your healthcare provider will help you set a weight-loss goal   · Lifestyle changes  are the first step in treating obesity  These include making healthy food choices and getting regular physical activity  Your healthcare provider may suggest a weight-loss program that involves coaching, education, and therapy  · Medicine  may help you lose weight when it is used with a healthy diet and physical activity  · Surgery  can help you lose weight if you are very obese and have other health problems  There are several types of weight-loss surgery  Ask your healthcare provider for more information    Be successful losing weight:   · Set small, realistic goals  An example of a small goal is to walk for 20 minutes 5 days a week  Anther goal is to lose 5% of your body weight  · Tell friends, family members, and coworkers about your goals  and ask for their support  Ask a friend to lose weight with you, or join a weight-loss support group  · Identify foods or triggers that may cause you to overeat , and find ways to avoid them  Remove tempting high-calorie foods from your home and workplace  Place a bowl of fresh fruit on your kitchen counter  If stress causes you to eat, then find other ways to cope with stress  · Keep a diary to track what you eat and drink  Also write down how many minutes of physical activity you do each day  Weigh yourself once a week and record it in your diary  Eating changes: You will need to eat 500 to 1,000 fewer calories each day than you currently eat to lose 1 to 2 pounds a week  The following changes will help you cut calories:  · Eat smaller portions  Use small plates, no larger than 9 inches in diameter  Fill your plate half full of fruits and vegetables  Measure your food using measuring cups until you know what a serving size looks like  · Eat 3 meals and 1 or 2 snacks each day  Plan your meals in advance  Mitch Aguirre and eat at home most of the time  Eat slowly  · Eat fruits and vegetables at every meal   They are low in calories and high in fiber, which makes you feel full  Do not add butter, margarine, or cream sauce to vegetables  Use herbs to season steamed vegetables  · Eat less fat and fewer fried foods  Eat more baked or grilled chicken and fish  These protein sources are lower in calories and fat than red meat  Limit fast food  Dress your salads with olive oil and vinegar instead of bottled dressing  · Limit the amount of sugar you eat  Do not drink sugary beverages  Limit alcohol  Activity changes:  Physical activity is good for your body in many ways   It helps you burn calories and build strong muscles  It decreases stress and depression, and improves your mood  It can also help you sleep better  Talk to your healthcare provider before you begin an exercise program   · Exercise for at least 30 minutes 5 days a week  Start slowly  Set aside time each day for physical activity that you enjoy and that is convenient for you  It is best to do both weight training and an activity that increases your heart rate, such as walking, bicycling, or swimming  · Find ways to be more active  Do yard work and housecleaning  Walk up the stairs instead of using elevators  Spend your leisure time going to events that require walking, such as outdoor festivals or fairs  This extra physical activity can help you lose weight and keep it off  Follow up with your healthcare provider as directed: You may need to meet with a dietitian  Write down your questions so you remember to ask them during your visits  © 2017 [address] [employer] Information is for End User's use only and may not be sold, redistributed or otherwise used for commercial purposes  All illustrations and images included in CareNotes® are the copyrighted property of [employer] , [employer]  or Rickie Carcamo  The above information is an  only  It is not intended as medical advice for individual conditions or treatments  Talk to your doctor, nurse or pharmacist before following any medical regimen to see if it is safe and effective for you  Low Fat Diet   AMBULATORY CARE:   A low-fat diet  is an eating plan that is low in total fat, unhealthy fat, and cholesterol  You may need to follow a low-fat diet if you have trouble digesting or absorbing fat  You may also need to follow this diet if you have high cholesterol  You can also lower your cholesterol by increasing the amount of fiber in your diet  Soluble fiber is a type of fiber that helps to decrease cholesterol levels     Different types of fat in food:   · Limit unhealthy fats  A diet that is high in cholesterol, saturated fat, and trans fat may cause unhealthy cholesterol levels  Unhealthy cholesterol levels increase your risk of heart disease  ¨ Cholesterol:  Limit intake of cholesterol to less than 200 mg per day  Cholesterol is found in meat, eggs, and dairy  ¨ Saturated fat:  Limit saturated fat to less than 7% of your total daily calories  Ask your dietitian how many calories you need each day  Saturated fat is found in butter, cheese, ice cream, whole milk, and palm oil  Saturated fat is also found in meat, such as beef, pork, chicken skin, and processed meats  Processed meats include sausage, hot dogs, and bologna  ¨ Trans fat:  Avoid trans fat as much as possible  Trans fat is used in fried and baked foods  Foods that say trans fat free on the label may still have up to 0 5 grams of trans fat per serving  · Include healthy fats  Replace foods that are high in saturated and trans fat with foods high in healthy fats  This may help to decrease high cholesterol levels  ¨ Monounsaturated fats: These are found in avocados, nuts, and vegetable oils, such as olive, canola, and sunflower oil  ¨ Polyunsaturated fats: These can be found in vegetable oils, such as soybean or corn oil  Omega-3 fats can help to decrease the risk of heart disease  Omega-3 fats are found in fish, such as salmon, herring, trout, and tuna  Omega-3 fats can also be found in plant foods, such as walnuts, flaxseed, soybeans, and canola oil    Foods to limit or avoid:   · Grains:      ¨ Snacks that are made with partially hydrogenated oils, such as chips, regular crackers, and butter-flavored popcorn    ¨ High-fat baked goods, such as biscuits, croissants, doughnuts, pies, cookies, and pastries    · Dairy:      ¨ Whole milk, 2% milk, and yogurt and ice cream made with whole milk    ¨ Half and half creamer, heavy cream, and whipping cream    ¨ Cheese, cream cheese, and sour cream    · Meats and proteins:      ¨ High-fat cuts of meat (T-bone steak, regular hamburger, and ribs)    ¨ Fried meat, poultry (turkey and chicken), and fish    ¨ Poultry (chicken and turkey) with skin    ¨ Cold cuts (salami or bologna), hot dogs, robert, and sausage    ¨ Whole eggs and egg yolks    · Vegetables and fruits with added fat:      ¨ Fried vegetables or vegetables in butter or high-fat sauces, such as cream or cheese sauces    ¨ Fried fruit or fruit served with butter or cream    · Fats:      ¨ Butter, stick margarine, and shortening    ¨ Coconut, palm oil, and palm kernel oil  Foods to include:   · Grains:      ¨ Whole-grain breads, cereals, pasta, and brown rice    ¨ Low-fat crackers and pretzels    · Vegetables and fruits:      ¨ Fresh, frozen, or canned vegetables (no salt or low-sodium)    ¨ Fresh, frozen, dried, or canned fruit (canned in light syrup or fruit juice)    ¨ Avocado    · Low-fat dairy products:      ¨ Nonfat (skim) or 1% milk    ¨ Nonfat or low-fat cheese, yogurt, and cottage cheese    · Meats and proteins:      ¨ Chicken or turkey with no skin    ¨ Baked or broiled fish    ¨ Lean beef and pork (loin, round, extra lean hamburger)    ¨ Beans and peas, unsalted nuts, soy products    ¨ Egg whites and substitutes    ¨ Seeds and nuts    · Fats:      ¨ Unsaturated oil, such as canola, olive, peanut, soybean, or sunflower oil    ¨ Soft or liquid margarine and vegetable oil spread    ¨ Low-fat salad dressing  Other ways to decrease fat:   · Read food labels before you buy foods  Choose foods that have less than 30% of calories from fat  Choose low-fat or fat-free dairy products  Remember that fat free does not mean calorie free  These foods still contain calories, and too many calories can lead to weight gain  · Trim fat from meat and avoid fried food  Trim all visible fat from meat before you cook it  Remove the skin from poultry  Do not mcknight meat, fish, or poultry  Bake, roast, boil, or broil these foods instead  Avoid fried foods  Eat a baked potato instead of [place] fries  Steam vegetables instead of sautéing them in butter  · Add less fat to foods  Use imitation robert bits on salads and baked potatoes instead of regular robert bits  Use fat-free or low-fat salad dressings instead of regular dressings  Use low-fat or nonfat butter-flavored topping instead of regular butter or margarine on popcorn and other foods  Ways to decrease fat in recipes:  Replace high-fat ingredients with low-fat or nonfat ones  This may cause baked goods to be drier than usual  You may need to use nonfat cooking spray on pans to prevent food from sticking  You also may need to change the amount of other ingredients, such as water, in the recipe  Try the following:  · Use low-fat or light margarine instead of regular margarine or shortening  · Use lean ground turkey breast or chicken, or lean ground beef (less than 5% fat) instead of hamburger  · Add 1 teaspoon of canola oil to 8 ounces of skim milk instead of using cream or half and half  · Use grated zucchini, carrots, or apples in breads instead of coconut  · Use blenderized, low-fat cottage cheese, plain tofu, or low-fat ricotta cheese instead of cream cheese  · Use 1 egg white and 1 teaspoon of canola oil, or use ¼ cup (2 ounces) of fat-free egg substitute instead of a whole egg  · Replace half of the oil that is called for in a recipe with applesauce when you bake  Use 3 tablespoons of cocoa powder and 1 tablespoon of canola oil instead of a square of baking chocolate  How to increase fiber:  Eat enough high-fiber foods to get 20 to 30 grams of fiber every day  Slowly increase your fiber intake to avoid stomach cramps, gas, and other problems  · Eat 3 ounces of whole-grain foods each day  An ounce is about 1 slice of bread  Eat whole-grain breads, such as whole-wheat bread   Whole wheat, whole-wheat flour, or other whole grains should be listed as the first ingredient on the food label  Replace white flour with whole-grain flour or use half of each in recipes  Whole-grain flour is heavier than white flour, so you may have to add more yeast or baking powder  · Eat a high-fiber cereal for breakfast   Oatmeal is a good source of soluble fiber  Look for cereals that have bran or fiber in the name  Choose whole-grain products, such as brown rice, barley, and whole-wheat pasta  · Eat more beans, peas, and lentils  For example, add beans to soups or salads  Eat at least 5 cups of fruits and vegetables each day  Eat fruits and vegetables with the peel because the peel is high in fiber  © 2017 [address] [employer] Information is for End User's use only and may not be sold, redistributed or otherwise used for commercial purposes  All illustrations and images included in CareNotes® are the copyrighted property of A D A M [employer]  or Rickie Carcamo  The above information is an  only  It is not intended as medical advice for individual conditions or treatments  Talk to your doctor, nurse or pharmacist before following any medical regimen to see if it is safe and effective for you  Heart Healthy Diet   AMBULATORY CARE:   A heart healthy diet  is an eating plan low in total fat, unhealthy fats, and sodium (salt)  A heart healthy diet helps decrease your risk for heart disease and stroke  Limit the amount of fat you eat to 25% to 35% of your total daily calories  Limit sodium to less than 2,300 mg each day  Healthy fats:  Healthy fats can help improve cholesterol levels  The risk for heart disease is decreased when cholesterol levels are normal  Choose healthy fats, such as the following:  · Unsaturated fat  is found in foods such as soybean, canola, olive, corn, and safflower oils  It is also found in soft tub margarine that is made with liquid vegetable oil       · Omega-3 fat  is found in certain fish, such as salmon, tuna, and trout, and in walnuts and flaxseed  Unhealthy fats:  Unhealthy fats can cause unhealthy cholesterol levels in your blood and increase your risk of heart disease  Limit unhealthy fats, such as the following:  · Cholesterol  is found in animal foods, such as eggs and lobster, and in dairy products made from whole milk  Limit cholesterol to less than 300 milligrams (mg) each day  You may need to limit cholesterol to 200 mg each day if you have heart disease  · Saturated fat  is found in meats, such as robert and hamburger  It is also found in chicken or turkey skin, whole milk, and butter  Limit saturated fat to less than 7% of your total daily calories  Limit saturated fat to less than 6% if you have heart disease or are at increased risk for it  · Trans fat  is found in packaged foods, such as potato chips and cookies  It is also in hard margarine, some fried foods, and shortening  Avoid trans fats as much as possible    Heart healthy foods and drinks to include:  Ask your dietitian or healthcare provider how many servings to have from each of the following food groups:  · Grains:      ¨ Whole-wheat breads, cereals, and pastas, and brown rice    ¨ Low-fat, low-sodium crackers and chips    · Vegetables:      ¨ Broccoli, green beans, green peas, and spinach    ¨ Collards, kale, and lima beans    ¨ Carrots, sweet potatoes, tomatoes, and peppers    ¨ Canned vegetables with no salt added    · Fruits:      ¨ Bananas, peaches, pears, and pineapple    ¨ Grapes, raisins, and dates    ¨ Oranges, tangerines, grapefruit, orange juice, and grapefruit juice    ¨ Apricots, mangoes, melons, and papaya    ¨ Raspberries and strawberries    ¨ Canned fruit with no added sugar    · Low-fat dairy products:      ¨ Nonfat (skim) milk, 1% milk, and low-fat almond, cashew, or soy milks fortified with calcium    ¨ Low-fat cheese, regular or frozen yogurt, and cottage cheese    · Meats and proteins , such as lean cuts of beef and pork (loin, leg, round), skinless chicken and turkey, legumes, soy products, egg whites, and nuts  Foods and drinks to limit or avoid:  Ask your dietitian or healthcare provider about these and other foods that are high in unhealthy fat, sodium, and sugar:  · Snack or packaged foods , such as frozen dinners, cookies, macaroni and cheese, and cereals with more than 300 mg of sodium per serving    · Canned or dry mixes  for cakes, soups, sauces, or gravies    · Vegetables with added sodium , such as instant potatoes, vegetables with added sauces, or regular canned vegetables    · Other foods high in sodium , such as ketchup, barbecue sauce, salad dressing, pickles, olives, soy sauce, and miso    · High-fat dairy foods  such as whole or 2% milk, cream cheese, or sour cream, and cheeses     · High-fat protein foods  such as high-fat cuts of beef (T-bone steaks, ribs), chicken or turkey with skin, and organ meats, such as liver    · Cured or smoked meats , such as hot dogs, robert, and sausage    · Unhealthy fats and oils , such as butter, stick margarine, shortening, and cooking oils such as coconut or palm oil    · Food and drinks high in sugar , such as soft drinks (soda), sports drinks, sweetened tea, candy, cake, cookies, pies, and doughnuts  Other diet guidelines to follow:   · Eat more foods containing omega-3 fats  Eat fish high in omega-3 fats at least 2 times a week  · Limit alcohol  Too much alcohol can damage your heart and raise your blood pressure  Women should limit alcohol to 1 drink a day  Men should limit alcohol to 2 drinks a day  A drink of alcohol is 12 ounces of beer, 5 ounces of wine, or 1½ ounces of liquor  · Choose low-sodium foods  High-sodium foods can lead to high blood pressure  Add little or no salt to food you prepare  Use herbs and spices in place of salt  · Eat more fiber  to help lower cholesterol levels   Eat at least 5 servings of fruits and vegetables each day  Eat 3 ounces of whole-grain foods each day  Legumes (beans) are also a good source of fiber  Lifestyle guidelines:   · Do not smoke  Nicotine and other chemicals in cigarettes and cigars can cause lung and heart damage  Ask your healthcare provider for information if you currently smoke and need help to quit  E-cigarettes or smokeless tobacco still contain nicotine  Talk to your healthcare provider before you use these products  · Exercise regularly  to help you maintain a healthy weight and improve your blood pressure and cholesterol levels  Ask your healthcare provider about the best exercise plan for you  Do not start an exercise program without asking your healthcare provider  Follow up with your healthcare provider as directed:  Write down your questions so you remember to ask them during your visits  © 2017 [address] [facility] Information is for End User's use only and may not be sold, redistributed or otherwise used for commercial purposes  All illustrations and images included in CareNotes® are the copyrighted property of A [employer] M [employer]  or Rickie Carcamo  The above information is an  only  It is not intended as medical advice for individual conditions or treatments  Talk to your doctor, nurse or pharmacist before following any medical regimen to see if it is safe and effective for you

## 2019-09-09 PROBLEM — E66.9 OBESITY (BMI 30.0-34.9): Status: ACTIVE | Noted: 2019-09-09

## 2019-09-09 PROBLEM — E66.811 OBESITY (BMI 30.0-34.9): Status: ACTIVE | Noted: 2019-09-09

## 2019-09-09 NOTE — ASSESSMENT & PLAN NOTE
BP have improved with dietary modifications and possibly due to medical marijuana use  He has remained on clonidine 0 1mg TID, hydralazine 100mg twice daily, labetalol 300mg TID, nifedipine 60mg twice daily and lisinopril 40mg daily

## 2019-09-09 NOTE — ASSESSMENT & PLAN NOTE
BMI 31 with comorbidities  Patient encouraged to decrease overall calorie intake any smaller portion sizes  Patient to increase physical activity as tolerated as well

## 2019-09-09 NOTE — ASSESSMENT & PLAN NOTE
Last A1c 5 3 at endo office, BS have been labile and there is consideration for insulin pump  Patient to continue discussion with Endo

## 2019-09-10 ENCOUNTER — TRANSCRIBE ORDERS (OUTPATIENT)
Dept: INTERNAL MEDICINE CLINIC | Facility: CLINIC | Age: 34
End: 2019-09-10

## 2019-09-10 DIAGNOSIS — H92.09 EAR PAIN: Primary | ICD-10-CM

## 2019-09-11 ENCOUNTER — REMOTE DEVICE CLINIC VISIT (OUTPATIENT)
Dept: CARDIOLOGY CLINIC | Facility: CLINIC | Age: 34
End: 2019-09-11
Payer: COMMERCIAL

## 2019-09-11 DIAGNOSIS — Z95.818 PRESENCE OF OTHER CARDIAC IMPLANTS AND GRAFTS: ICD-10-CM

## 2019-09-11 DIAGNOSIS — Z86.73 PERSONAL HISTORY OF TRANSIENT ISCHEMIC ATTACK: Primary | ICD-10-CM

## 2019-09-11 PROCEDURE — 93299 PR REM INTERROG ICPMS/SCRMS <30 D TECH REVIEW: CPT | Performed by: INTERNAL MEDICINE

## 2019-09-11 PROCEDURE — 93298 REM INTERROG DEV EVAL SCRMS: CPT | Performed by: INTERNAL MEDICINE

## 2019-09-11 NOTE — PROGRESS NOTES
MDT LOOP  CARELINK TRANSMISSION: LOOP RECORDER  PRESENTING RHYTHM NSR @ 80 BPM  BATTERY STATUS "OK"  NO PATIENT OR DEVICE ACTIVATED EPISODES  NORMAL DEVICE FUNCTION   DL

## 2019-09-17 ENCOUNTER — OFFICE VISIT (OUTPATIENT)
Dept: GASTROENTEROLOGY | Facility: AMBULARY SURGERY CENTER | Age: 34
End: 2019-09-17
Payer: COMMERCIAL

## 2019-09-17 VITALS
DIASTOLIC BLOOD PRESSURE: 80 MMHG | SYSTOLIC BLOOD PRESSURE: 150 MMHG | HEIGHT: 70 IN | RESPIRATION RATE: 18 BRPM | WEIGHT: 215.4 LBS | HEART RATE: 80 BPM | TEMPERATURE: 99.1 F | BODY MASS INDEX: 30.84 KG/M2

## 2019-09-17 DIAGNOSIS — K31.84 GASTROPARESIS DIABETICORUM (HCC): Primary | ICD-10-CM

## 2019-09-17 DIAGNOSIS — E11.43 GASTROPARESIS DIABETICORUM (HCC): Primary | ICD-10-CM

## 2019-09-17 PROCEDURE — 99214 OFFICE O/P EST MOD 30 MIN: CPT | Performed by: PHYSICIAN ASSISTANT

## 2019-09-17 RX ORDER — LANTHANUM CARBONATE 1000 MG/1
TABLET, CHEWABLE ORAL
Refills: 11 | Status: ON HOLD | COMMUNITY
Start: 2019-09-11 | End: 2020-02-20

## 2019-09-17 NOTE — ASSESSMENT & PLAN NOTE
She was noted with delayed gastric emptying at the time of recent gastric obtaining study, which was ordered after there was noted to be retained food in the stomach during EGD in April    He reports improving symptoms with H2 blocker therapy and with taking small frequent meals     - Advised patient to continue with dietary changes of small, frequent meals, avoid large amounts of high residue/hard-to-digest foods at one time    - Advised patient maintain regular follow-up with his endocrinologist and other healthcare providers regarding management of his underlying diabetes    - Patient continue with Zofran as needed    - Continue Zantac 150 mg daily    - Office follow-up in 6 months or as needed

## 2019-09-17 NOTE — PROGRESS NOTES
Follow-up Note -  [facility]  Dominic Lewis 1985 37 y o  male         Reason:  Follow up; recurrent nausea and vomiting, gastroparesis recently diagnosed    HPI:  25-year-old male with history of multiple medical problems including type 1 diabetes, cerebrovascular accident, end-stage renal disease on peritoneal dialysis who presents for follow-up; he was seen in the office with Dr Joseph Robledo in March for evaluation of recurrent nausea and vomiting issues, underwent EGD in April which showed moderate gastritis and duodenitis with esophagitis, there was also retained food in the stomach suspicious for gastroparesis  Biopsies were negative for evidence of celiac disease or H  Pylori infection, but a subsequent gastric emptying study did indicate delayed emptying with 64% gastric contents emptied at 4 hours    Patient says he is feeling better at this time, after making some adjustments to his diet, primarily decreasing portion sizes and taking small, frequent meals  He denies any blood in the stools or melena, he says that a few days ago he was experiencing diarrhea which lasted for a couple of days but then resolved on its own  He says he still occasionally gets nausea but this is becoming less frequent  Denies any significant acid reflux at this point, he is taking Zantac 150 mg nightly  REVIEW OF SYSTEMS:      CONSTITUTIONAL: Denies any fever, chills, or rigors  Good appetite, and no recent weight loss  HEENT: No earache or tinnitus  Denies hearing loss or visual disturbances  CARDIOVASCULAR: No chest pain or palpitations  RESPIRATORY: Denies any cough, hemoptysis, shortness of breath or dyspnea on exertion  GASTROINTESTINAL: As noted in the History of Present Illness  GENITOURINARY: No problems with urination  Denies any hematuria or dysuria  NEUROLOGIC: No dizziness or vertigo, denies headaches  MUSCULOSKELETAL: Denies any muscle or joint pain     SKIN: Denies skin rashes or itching  ENDOCRINE: Denies excessive thirst  Denies intolerance to heat or cold  PSYCHOSOCIAL: Denies depression or anxiety  Denies any recent memory loss  Past Medical History:   Diagnosis Date    [facility] [address] )     Anxiety     Cerebellar stroke side undetermined 2015,2018    Diabetes type 1, controlled ([facility] [address] )     IDDM    Diarrhea     History of shingles     History of transfusion 2018    Hypertension     Muscle weakness     general unsteadiness    Retinopathy       Past Surgical History:   Procedure Laterality Date    CARDIAC LOOP RECORDER  2018    EGD      EYE SURGERY      PERITONEAL CATHETER INSERTION N/A 2018    Procedure: UNROOF PD CATHETER;  Surgeon: Sheyla Bedolla DO;  [facility];  Service: General    [place] ESOPHAGOGASTRODUODENOSCOPY TRANSORAL DIAGNOSTIC N/A 2019    Procedure: ESOPHAGOGASTRODUODENOSCOPY (EGD); Surgeon: Che Blas MD;  [facility];   Service: Gastroenterology    [place] LAP INSERTION TUNNELED INTRAPERITONEAL CATHETER N/A 2018    Procedure: LAPAROSCOPIC PD CATHETER PLACEMENT;  Surgeon: Sheyla Bedolla DO;  [facility];  Service: General    TONSILLECTOMY       Social History     Socioeconomic History    Marital status: Single     Spouse name: Not on file    Number of children: Not on file    Years of education: Not on file    Highest education level: Not on file   Occupational History    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   Social Needs    Financial resource strain: Not on file    Food insecurity:     Worry: Not on file     Inability: Not on file    Transportation needs:     Medical: Not on file     Non-medical: Not on file   Tobacco Use    Smoking status: Former Smoker     Packs/day: 0 50     Years: 12 00     Pack years: 6 00     Types: Cigarettes     Last attempt to quit: 2018     Years since quittin 6    Smokeless tobacco: Never Used    Tobacco comment: quit 2/2018   Substance and Sexual Activity    Alcohol use: Not Currently     Comment: rarely    Drug use: Yes     Frequency: 5 0 times per week     Types: Marijuana    Sexual activity: Not on file   Lifestyle    Physical activity:     Days per week: Not on file     Minutes per session: Not on file    Stress: Not on file   Relationships    Social connections:     Talks on phone: Not on file     Gets together: Not on file     Attends Spiritism service: Not on file     Active member of club or organization: Not on file     Attends meetings of clubs or organizations: Not on file     Relationship status: Not on file    Intimate partner violence:     Fear of current or ex partner: Not on file     Emotionally abused: Not on file     Physically abused: Not on file     Forced sexual activity: Not on file   Other Topics Concern    Not on file   Social History Narrative    Caffeine use    single     Family History   Problem Relation Age of Onset    Breast cancer Mother     Hypertension Mother     Hyperlipidemia Father     Hypertension Father     Leukemia Maternal Grandmother     Hyperlipidemia Maternal Grandfather     Hypertension Maternal Grandfather     Hyperlipidemia Paternal Grandmother     Hypertension Paternal Grandmother     Heart disease Paternal Grandfather         cardiac disorder    Diabetes Paternal Grandfather      Sulfa antibiotics  Current Outpatient Medications   Medication Sig Dispense Refill    ADMELOG SOLOSTAR 100 units/mL injection pen INJECT 10 UNITS WITH A SLIDING SCALE OF 1 UNIT FOR EVERY 25 OVER 150 SUBCUTANEOUSLY THREE TIMES DAILY WITH MEALS 3 mL 1    aspirin 81 mg chewable tablet Chew 81 mg daily      atorvastatin (LIPITOR) 40 mg tablet Take 1 tablet (40 mg total) by mouth every evening 90 tablet 1    b complex vitamins capsule Take 1 capsule by mouth daily      calcium acetate (PHOSLO) 667 mg capsule Take 1 capsule (667 mg total) by mouth 3 (three) times a day with meals  0    Cholecalciferol (VITAMIN D) 2000 units CAPS Take 1 capsule by mouth daily      cinacalcet (SENSIPAR) 90 MG tablet Take 90 mg by mouth daily  11    cloNIDine (CATAPRES) 0 1 mg tablet Take 0 1 mg by mouth 3 (three) times a day       ergocalciferol (ERGOCALCIFEROL) 27189 units capsule Take 50,000 Units by mouth every 30 (thirty) days       escitalopram (LEXAPRO) 10 mg tablet Take 1 tablet (10 mg total) by mouth daily 90 tablet 2    gentamicin (GARAMYCIN) 0 1 % cream       GLUCAGON EMERGENCY 1 MG injection INJECT 1MG SUBCUTANEOUSLY AS NEEDED (AS DIRECTED)   MAY REPEAT DOSE EVERY 20 MINUTES AS NEEDED  3    hydrALAZINE (APRESOLINE) 100 MG tablet Take 0 5 tablets (50 mg total) by mouth 3 (three) times a day (Patient taking differently: Take 100 mg by mouth 3 (three) times a day 100 mg twice daily)      hydrOXYzine HCL (ATARAX) 10 mg tablet Take 1 tablet (10 mg total) by mouth every 6 (six) hours as needed for itching 120 tablet 0    insulin glargine (BASAGLAR KWIKPEN) 100 units/mL injection pen Inject 12u in AM and 15u in PM 5 pen 0    labetalol (NORMODYNE) 300 mg tablet Take 0 5 tablets (150 mg total) by mouth every 8 (eight) hours (Patient taking differently: Take 300 mg by mouth as needed ) 60 tablet 3    lanthanum (FOSRENOL) 1000 MG chewable tablet CHEW 1 TABLET BY MOUTH WITH MEALS AND 1 TAB WITH SNACKS DAILY  11    lisinopril (ZESTRIL) 40 mg tablet Take 40 mg by mouth daily      Melatonin 10 MG TABS Take 3 mg by mouth daily at bedtime       mupirocin (BACTROBAN) 2 % ointment Apply topically 3 (three) times a day 22 g 0    NIFEdipine ER (ADALAT CC) 60 MG 24 hr tablet Take 1 tablet (60 mg total) by mouth daily (Patient taking differently: Take 60 mg by mouth 2 (two) times a day  ) 180 tablet 0    ondansetron (ZOFRAN) 4 mg tablet Take 1 tablet (4 mg total) by mouth every 8 (eight) hours as needed for nausea or vomiting (Patient taking differently: Take 4 mg by mouth every 8 (eight) hours as needed for nausea or vomiting ) 30 tablet 0    Probiotic Product (PROBIOTIC DAILY) CAPS Take 1 capsule by mouth daily      ranitidine (ZANTAC) 150 mg tablet Take 1 tablet (150 mg total) by mouth daily at bedtime 30 tablet 2    torsemide (DEMADEX) 100 mg tablet Take 100 mg by mouth every morning  11    Sucroferric Oxyhydroxide (VELPHORO PO) Velphoro       No current facility-administered medications for this visit  Blood pressure 150/80, pulse 80, temperature 99 1 °F (37 3 °C), temperature source Tympanic, resp  rate 18, height 5' 10" (1 778 m), weight 97 7 kg (215 lb 6 4 oz)  PHYSICAL EXAM:      General Appearance:   Alert, Somewhat pale skin,cooperative, no distress, appears stated age    HEENT:   Normocephalic, atraumatic, anicteric      Neck:  Supple, symmetrical, trachea midline, no adenopathy;    thyroid: no enlargement/tenderness/nodules; no carotid  bruit or JVD    Lungs:   Clear to auscultation bilaterally; no rales, rhonchi or wheezing; respirations unlabored    Heart[de-identified]   S1 and S2 normal; regular rate and rhythm; no murmur, rub, or gallop     Abdomen:   Soft, non-tender, non-distended; normal bowel sounds; no masses, no organomegaly , Peritoneal dialysis catheter noted   Extremities: No edema, erythema, wounds, rashes; A few excoriations noted on upper and lower extremities, patient says he was prescribed Atarax for Itching   Rectal:   Deferred                      Lab Results   Component Value Date    WBC 5 77 05/24/2019    HGB 9 5 (L) 05/24/2019    HCT 28 4 (L) 05/24/2019    MCV 93 05/24/2019     05/24/2019     Lab Results   Component Value Date    GLUCOSE 92 10/28/2015    CALCIUM 9 7 07/20/2019     10/28/2015    K 5 3 07/20/2019    CO2 26 07/20/2019     07/20/2019    BUN 57 (H) 07/20/2019    CREATININE 11 56 (H) 07/20/2019     Lab Results   Component Value Date    ALT 19 05/24/2019    AST 19 05/24/2019    ALKPHOS 80 05/24/2019    BILITOT 0 49 10/28/2015     Lab Results   Component Value Date    INR 1 05 09/16/2018    INR 0 90 02/12/2018    INR 1 04 01/31/2018    PROTIME 13 4 09/16/2018    PROTIME 12 4 02/12/2018    PROTIME 13 9 01/31/2018       No results found  ASSESSMENT & PLAN:    Gastroparesis diabeticorum ([address] )  She was noted with delayed gastric emptying at the time of recent gastric obtaining study, which was ordered after there was noted to be retained food in the stomach during EGD in April    He reports improving symptoms with H2 blocker therapy and with taking small frequent meals     - Advised patient to continue with dietary changes of small, frequent meals, avoid large amounts of high residue/hard-to-digest foods at one time    - Advised patient maintain regular follow-up with his endocrinologist and other healthcare providers regarding management of his underlying diabetes    - Patient continue with Zofran as needed    - Continue Zantac 150 mg daily    - Office follow-up in 6 months or as needed

## 2019-09-23 DIAGNOSIS — E10.21 TYPE 1 DIABETES MELLITUS WITH DIABETIC NEPHROPATHY, WITH LONG-TERM CURRENT USE OF INSULIN (HCC): Primary | Chronic | ICD-10-CM

## 2019-09-24 RX ORDER — PEN NEEDLE, DIABETIC 31 GX5/16"
NEEDLE, DISPOSABLE MISCELLANEOUS
Qty: 100 EACH | Refills: 47 | Status: SHIPPED | OUTPATIENT
Start: 2019-09-24

## 2019-10-07 LAB
LEFT EYE DIABETIC RETINOPATHY: NORMAL
RIGHT EYE DIABETIC RETINOPATHY: NORMAL

## 2019-10-10 ENCOUNTER — TELEPHONE (OUTPATIENT)
Dept: GASTROENTEROLOGY | Facility: AMBULARY SURGERY CENTER | Age: 34
End: 2019-10-10

## 2019-10-10 DIAGNOSIS — K20.90 ESOPHAGITIS: ICD-10-CM

## 2019-10-10 RX ORDER — RANITIDINE 150 MG/1
150 TABLET ORAL
Qty: 30 TABLET | Refills: 11 | Status: SHIPPED | OUTPATIENT
Start: 2019-10-10 | End: 2019-12-06

## 2019-11-20 ENCOUNTER — OFFICE VISIT (OUTPATIENT)
Dept: NEUROLOGY | Facility: CLINIC | Age: 34
End: 2019-11-20
Payer: MEDICARE

## 2019-11-20 VITALS
DIASTOLIC BLOOD PRESSURE: 86 MMHG | SYSTOLIC BLOOD PRESSURE: 140 MMHG | HEIGHT: 70 IN | HEART RATE: 86 BPM | WEIGHT: 223 LBS | BODY MASS INDEX: 31.92 KG/M2

## 2019-11-20 DIAGNOSIS — Z86.73 HISTORY OF STROKE: Primary | ICD-10-CM

## 2019-11-20 DIAGNOSIS — G62.9 PERIPHERAL POLYNEUROPATHY: ICD-10-CM

## 2019-11-20 PROCEDURE — 99213 OFFICE O/P EST LOW 20 MIN: CPT | Performed by: PHYSICIAN ASSISTANT

## 2019-11-20 RX ORDER — GABAPENTIN 100 MG/1
100 CAPSULE ORAL
Refills: 1 | COMMUNITY
Start: 2019-11-13 | End: 2021-01-06 | Stop reason: SDUPTHER

## 2019-11-20 NOTE — ASSESSMENT & PLAN NOTE
· Pt uses medical marijuana  · No new medications will be prescribed  · He is looking into a pump for better diabetic control

## 2019-11-20 NOTE — PATIENT INSTRUCTIONS
Peripheral polyneuropathy  · Pt uses medical marijuana  · No new medications will be prescribed  · He is looking into a pump for better diabetic control  History of lacunar cerebrovascular accident (CVA)  · Continue ASA and atorvastatin  · He is medically stable from a neurologic perspective for transplant  · Twenty minutes were spent with the patient gathering history, examining patient and formulating a treatment plan  Pt/family understood and were in agreement with the treatment plan  · He will follow-up in 6 months

## 2019-11-20 NOTE — ASSESSMENT & PLAN NOTE
· Continue ASA and atorvastatin  · He is medically stable from a neurologic perspective for transplant  · Twenty minutes were spent with the patient gathering history, examining patient and formulating a treatment plan  Pt/family understood and were in agreement with the treatment plan  · He will follow-up in 6 months

## 2019-12-06 ENCOUNTER — TELEPHONE (OUTPATIENT)
Dept: GASTROENTEROLOGY | Facility: CLINIC | Age: 34
End: 2019-12-06

## 2019-12-06 DIAGNOSIS — K31.84 GASTROPARESIS DIABETICORUM (HCC): Primary | ICD-10-CM

## 2019-12-06 DIAGNOSIS — E11.43 GASTROPARESIS DIABETICORUM (HCC): Primary | ICD-10-CM

## 2019-12-06 RX ORDER — FAMOTIDINE 20 MG/1
20 TABLET, FILM COATED ORAL
Qty: 30 TABLET | Refills: 5 | Status: SHIPPED | OUTPATIENT
Start: 2019-12-06 | End: 2020-06-16 | Stop reason: SDUPTHER

## 2019-12-06 NOTE — TELEPHONE ENCOUNTER
Patients GI provider:  Dr Ama Tapia    Number to return call: [phone]    Reason for call: Pt's mom called stating the pharmacy informed them that there is a recall on ranitidine and wanted to know what else can he take?     Scheduled procedure/appointment date if applicable: NA

## 2019-12-06 NOTE — TELEPHONE ENCOUNTER
Patient last seen Sept 2019 with hx of n/v, gastroparesis  Recommendation was to continue zantac 150 mg daily  Please order medication to replace zantac, thank you

## 2019-12-07 ENCOUNTER — HOSPITAL ENCOUNTER (INPATIENT)
Facility: HOSPITAL | Age: 34
LOS: 9 days | Discharge: NURSING, REHAB OR LONG-TERM CARE | DRG: 637 | End: 2019-12-16
Attending: EMERGENCY MEDICINE | Admitting: INTERNAL MEDICINE
Payer: MEDICARE

## 2019-12-07 ENCOUNTER — APPOINTMENT (EMERGENCY)
Dept: RADIOLOGY | Facility: HOSPITAL | Age: 34
DRG: 637 | End: 2019-12-07
Payer: MEDICARE

## 2019-12-07 DIAGNOSIS — R50.9 FEVER: ICD-10-CM

## 2019-12-07 DIAGNOSIS — E03.9 HYPOTHYROIDISM: ICD-10-CM

## 2019-12-07 DIAGNOSIS — N18.6 END-STAGE RENAL DISEASE ON PERITONEAL DIALYSIS (HCC): ICD-10-CM

## 2019-12-07 DIAGNOSIS — Z99.2 END-STAGE RENAL DISEASE ON PERITONEAL DIALYSIS (HCC): ICD-10-CM

## 2019-12-07 DIAGNOSIS — E83.41 HYPERMAGNESEMIA: ICD-10-CM

## 2019-12-07 DIAGNOSIS — Z99.2 DEPENDENCE ON PERITONEAL DIALYSIS (HCC): ICD-10-CM

## 2019-12-07 DIAGNOSIS — E83.39 HYPERPHOSPHATEMIA: ICD-10-CM

## 2019-12-07 DIAGNOSIS — E16.2 HYPOGLYCEMIA: Primary | ICD-10-CM

## 2019-12-07 DIAGNOSIS — R10.9 ABDOMINAL PAIN: ICD-10-CM

## 2019-12-07 PROBLEM — T68.XXXA HYPOTHERMIA: Status: ACTIVE | Noted: 2019-12-07

## 2019-12-07 PROBLEM — R79.89 ELEVATED TSH: Status: ACTIVE | Noted: 2019-12-07

## 2019-12-07 PROBLEM — E10.649 TYPE 1 DIABETES MELLITUS WITH HYPOGLYCEMIA (HCC): Status: ACTIVE | Noted: 2017-06-19

## 2019-12-07 LAB
ALBUMIN SERPL BCP-MCNC: 3.5 G/DL (ref 3.5–5)
ALP SERPL-CCNC: 75 U/L (ref 46–116)
ALT SERPL W P-5'-P-CCNC: 16 U/L (ref 12–78)
ANION GAP SERPL CALCULATED.3IONS-SCNC: 15 MMOL/L (ref 4–13)
AST SERPL W P-5'-P-CCNC: 18 U/L (ref 5–45)
BASOPHILS # BLD AUTO: 0.06 THOUSANDS/ΜL (ref 0–0.1)
BASOPHILS NFR BLD AUTO: 1 % (ref 0–1)
BILIRUB SERPL-MCNC: 0.45 MG/DL (ref 0.2–1)
BUN SERPL-MCNC: 50 MG/DL (ref 5–25)
CALCIUM SERPL-MCNC: 9.1 MG/DL (ref 8.3–10.1)
CHLORIDE SERPL-SCNC: 100 MMOL/L (ref 100–108)
CO2 SERPL-SCNC: 28 MMOL/L (ref 21–32)
CREAT SERPL-MCNC: 14.57 MG/DL (ref 0.6–1.3)
EOSINOPHIL # BLD AUTO: 0.38 THOUSAND/ΜL (ref 0–0.61)
EOSINOPHIL NFR BLD AUTO: 5 % (ref 0–6)
ERYTHROCYTE [DISTWIDTH] IN BLOOD BY AUTOMATED COUNT: 14.4 % (ref 11.6–15.1)
GFR SERPL CREATININE-BSD FRML MDRD: 4 ML/MIN/1.73SQ M
GLUCOSE SERPL-MCNC: 103 MG/DL (ref 65–140)
GLUCOSE SERPL-MCNC: 211 MG/DL (ref 65–140)
GLUCOSE SERPL-MCNC: 76 MG/DL (ref 65–140)
GLUCOSE SERPL-MCNC: 77 MG/DL (ref 65–140)
GLUCOSE SERPL-MCNC: 81 MG/DL (ref 65–140)
HCT VFR BLD AUTO: 32.5 % (ref 36.5–49.3)
HGB BLD-MCNC: 10.3 G/DL (ref 12–17)
IMM GRANULOCYTES # BLD AUTO: 0.04 THOUSAND/UL (ref 0–0.2)
IMM GRANULOCYTES NFR BLD AUTO: 1 % (ref 0–2)
LACTATE SERPL-SCNC: 1.2 MMOL/L (ref 0.5–2)
LIPASE SERPL-CCNC: 100 U/L (ref 73–393)
LYMPHOCYTES # BLD AUTO: 1.24 THOUSANDS/ΜL (ref 0.6–4.47)
LYMPHOCYTES NFR BLD AUTO: 15 % (ref 14–44)
MAGNESIUM SERPL-MCNC: 3.1 MG/DL (ref 1.6–2.6)
MCH RBC QN AUTO: 32.3 PG (ref 26.8–34.3)
MCHC RBC AUTO-ENTMCNC: 31.7 G/DL (ref 31.4–37.4)
MCV RBC AUTO: 102 FL (ref 82–98)
MONOCYTES # BLD AUTO: 0.57 THOUSAND/ΜL (ref 0.17–1.22)
MONOCYTES NFR BLD AUTO: 7 % (ref 4–12)
NEUTROPHILS # BLD AUTO: 6.16 THOUSANDS/ΜL (ref 1.85–7.62)
NEUTS SEG NFR BLD AUTO: 71 % (ref 43–75)
NRBC BLD AUTO-RTO: 0 /100 WBCS
PHOSPHATE SERPL-MCNC: 8.9 MG/DL (ref 2.7–4.5)
PLATELET # BLD AUTO: 318 THOUSANDS/UL (ref 149–390)
PMV BLD AUTO: 9.6 FL (ref 8.9–12.7)
POTASSIUM SERPL-SCNC: 5.3 MMOL/L (ref 3.5–5.3)
PROT SERPL-MCNC: 6.9 G/DL (ref 6.4–8.2)
RBC # BLD AUTO: 3.19 MILLION/UL (ref 3.88–5.62)
SODIUM SERPL-SCNC: 143 MMOL/L (ref 136–145)
T4 FREE SERPL-MCNC: 0.83 NG/DL (ref 0.76–1.46)
TROPONIN I SERPL-MCNC: 0.02 NG/ML
TSH SERPL DL<=0.05 MIU/L-ACNC: 6.5 UIU/ML (ref 0.36–3.74)
WBC # BLD AUTO: 8.45 THOUSAND/UL (ref 4.31–10.16)

## 2019-12-07 PROCEDURE — 3E1M39Z IRRIGATION OF PERITONEAL CAVITY USING DIALYSATE, PERCUTANEOUS APPROACH: ICD-10-PCS | Performed by: INTERNAL MEDICINE

## 2019-12-07 PROCEDURE — 84439 ASSAY OF FREE THYROXINE: CPT | Performed by: PHYSICIAN ASSISTANT

## 2019-12-07 PROCEDURE — 93005 ELECTROCARDIOGRAM TRACING: CPT

## 2019-12-07 PROCEDURE — 84443 ASSAY THYROID STIM HORMONE: CPT | Performed by: EMERGENCY MEDICINE

## 2019-12-07 PROCEDURE — 71046 X-RAY EXAM CHEST 2 VIEWS: CPT

## 2019-12-07 PROCEDURE — 36415 COLL VENOUS BLD VENIPUNCTURE: CPT | Performed by: PHYSICIAN ASSISTANT

## 2019-12-07 PROCEDURE — 99223 1ST HOSP IP/OBS HIGH 75: CPT | Performed by: PHYSICIAN ASSISTANT

## 2019-12-07 PROCEDURE — 99283 EMERGENCY DEPT VISIT LOW MDM: CPT | Performed by: EMERGENCY MEDICINE

## 2019-12-07 PROCEDURE — 83735 ASSAY OF MAGNESIUM: CPT | Performed by: EMERGENCY MEDICINE

## 2019-12-07 PROCEDURE — 83690 ASSAY OF LIPASE: CPT | Performed by: EMERGENCY MEDICINE

## 2019-12-07 PROCEDURE — 82948 REAGENT STRIP/BLOOD GLUCOSE: CPT

## 2019-12-07 PROCEDURE — 84100 ASSAY OF PHOSPHORUS: CPT | Performed by: EMERGENCY MEDICINE

## 2019-12-07 PROCEDURE — 99291 CRITICAL CARE FIRST HOUR: CPT

## 2019-12-07 PROCEDURE — 84484 ASSAY OF TROPONIN QUANT: CPT | Performed by: EMERGENCY MEDICINE

## 2019-12-07 PROCEDURE — 80053 COMPREHEN METABOLIC PANEL: CPT | Performed by: EMERGENCY MEDICINE

## 2019-12-07 PROCEDURE — 83605 ASSAY OF LACTIC ACID: CPT | Performed by: EMERGENCY MEDICINE

## 2019-12-07 PROCEDURE — 85025 COMPLETE CBC W/AUTO DIFF WBC: CPT | Performed by: EMERGENCY MEDICINE

## 2019-12-07 RX ORDER — NIFEDIPINE 10 MG/1
50 CAPSULE ORAL EVERY 8 HOURS SCHEDULED
Status: DISCONTINUED | OUTPATIENT
Start: 2019-12-07 | End: 2019-12-11

## 2019-12-07 RX ORDER — ONDANSETRON 2 MG/ML
4 INJECTION INTRAMUSCULAR; INTRAVENOUS EVERY 6 HOURS PRN
Status: CANCELLED | OUTPATIENT
Start: 2019-12-07

## 2019-12-07 RX ORDER — METOCLOPRAMIDE HYDROCHLORIDE 5 MG/ML
10 INJECTION INTRAMUSCULAR; INTRAVENOUS ONCE
Status: COMPLETED | OUTPATIENT
Start: 2019-12-07 | End: 2019-12-07

## 2019-12-07 RX ORDER — ACETAMINOPHEN 325 MG/1
650 TABLET ORAL EVERY 8 HOURS PRN
Status: DISCONTINUED | OUTPATIENT
Start: 2019-12-07 | End: 2019-12-16 | Stop reason: HOSPADM

## 2019-12-07 RX ORDER — HEPARIN SODIUM 5000 [USP'U]/ML
5000 INJECTION, SOLUTION INTRAVENOUS; SUBCUTANEOUS EVERY 8 HOURS SCHEDULED
Status: DISCONTINUED | OUTPATIENT
Start: 2019-12-08 | End: 2019-12-16 | Stop reason: HOSPADM

## 2019-12-07 RX ADMIN — METOCLOPRAMIDE HYDROCHLORIDE 10 MG: 5 INJECTION INTRAMUSCULAR; INTRAVENOUS at 22:47

## 2019-12-07 RX ADMIN — ONDANSETRON 8 MG: 2 INJECTION INTRAMUSCULAR; INTRAVENOUS at 23:18

## 2019-12-07 RX ADMIN — NIFEDIPINE 50 MG: 10 CAPSULE ORAL at 19:21

## 2019-12-08 LAB
ALBUMIN SERPL BCP-MCNC: 3.6 G/DL (ref 3.5–5)
ALP SERPL-CCNC: 79 U/L (ref 46–116)
ALT SERPL W P-5'-P-CCNC: 14 U/L (ref 12–78)
ANION GAP SERPL CALCULATED.3IONS-SCNC: 16 MMOL/L (ref 4–13)
ANION GAP SERPL CALCULATED.3IONS-SCNC: 18 MMOL/L (ref 4–13)
APPEARANCE FLD: CLEAR
AST SERPL W P-5'-P-CCNC: 18 U/L (ref 5–45)
BASOPHILS # BLD AUTO: 0.07 THOUSANDS/ΜL (ref 0–0.1)
BASOPHILS NFR BLD AUTO: 1 % (ref 0–1)
BILIRUB SERPL-MCNC: 0.54 MG/DL (ref 0.2–1)
BUN SERPL-MCNC: 53 MG/DL (ref 5–25)
BUN SERPL-MCNC: 58 MG/DL (ref 5–25)
CALCIUM SERPL-MCNC: 9.5 MG/DL (ref 8.3–10.1)
CALCIUM SERPL-MCNC: 9.9 MG/DL (ref 8.3–10.1)
CHLORIDE SERPL-SCNC: 95 MMOL/L (ref 100–108)
CHLORIDE SERPL-SCNC: 96 MMOL/L (ref 100–108)
CO2 SERPL-SCNC: 27 MMOL/L (ref 21–32)
CO2 SERPL-SCNC: 28 MMOL/L (ref 21–32)
COLOR FLD: COLORLESS
CREAT SERPL-MCNC: 14.94 MG/DL (ref 0.6–1.3)
CREAT SERPL-MCNC: 15.26 MG/DL (ref 0.6–1.3)
EOSINOPHIL # BLD AUTO: 0.16 THOUSAND/ΜL (ref 0–0.61)
EOSINOPHIL NFR BLD AUTO: 1 % (ref 0–6)
ERYTHROCYTE [DISTWIDTH] IN BLOOD BY AUTOMATED COUNT: 14.6 % (ref 11.6–15.1)
FLUAV RNA NPH QL NAA+PROBE: NORMAL
FLUBV RNA NPH QL NAA+PROBE: NORMAL
GFR SERPL CREATININE-BSD FRML MDRD: 4 ML/MIN/1.73SQ M
GFR SERPL CREATININE-BSD FRML MDRD: 4 ML/MIN/1.73SQ M
GLUCOSE SERPL-MCNC: 223 MG/DL (ref 65–140)
GLUCOSE SERPL-MCNC: 236 MG/DL (ref 65–140)
GLUCOSE SERPL-MCNC: 264 MG/DL (ref 65–140)
GLUCOSE SERPL-MCNC: 296 MG/DL (ref 65–140)
GLUCOSE SERPL-MCNC: 297 MG/DL (ref 65–140)
GLUCOSE SERPL-MCNC: 316 MG/DL (ref 65–140)
GLUCOSE SERPL-MCNC: 336 MG/DL (ref 65–140)
GLUCOSE SERPL-MCNC: 354 MG/DL (ref 65–140)
GLUCOSE SERPL-MCNC: 415 MG/DL (ref 65–140)
GLUCOSE SERPL-MCNC: 423 MG/DL (ref 65–140)
HCT VFR BLD AUTO: 31.1 % (ref 36.5–49.3)
HGB BLD-MCNC: 10 G/DL (ref 12–17)
IMM GRANULOCYTES # BLD AUTO: 0.05 THOUSAND/UL (ref 0–0.2)
IMM GRANULOCYTES NFR BLD AUTO: 1 % (ref 0–2)
LYMPHOCYTES # BLD AUTO: 1.16 THOUSANDS/ΜL (ref 0.6–4.47)
LYMPHOCYTES NFR BLD AUTO: 11 % (ref 14–44)
LYMPHOCYTES NFR BLD AUTO: 82 %
MAGNESIUM SERPL-MCNC: 3 MG/DL (ref 1.6–2.6)
MCH RBC QN AUTO: 32.7 PG (ref 26.8–34.3)
MCHC RBC AUTO-ENTMCNC: 32.2 G/DL (ref 31.4–37.4)
MCV RBC AUTO: 102 FL (ref 82–98)
MONOCYTES # BLD AUTO: 0.53 THOUSAND/ΜL (ref 0.17–1.22)
MONOCYTES NFR BLD AUTO: 1 %
MONOCYTES NFR BLD AUTO: 5 % (ref 4–12)
MONONUC CELLS NFR FLD MANUAL: 15 %
NEUTROPHILS # BLD AUTO: 9.07 THOUSANDS/ΜL (ref 1.85–7.62)
NEUTS SEG NFR BLD AUTO: 2 %
NEUTS SEG NFR BLD AUTO: 81 % (ref 43–75)
NRBC BLD AUTO-RTO: 0 /100 WBCS
PLATELET # BLD AUTO: 351 THOUSANDS/UL (ref 149–390)
PMV BLD AUTO: 10.2 FL (ref 8.9–12.7)
POTASSIUM SERPL-SCNC: 5.2 MMOL/L (ref 3.5–5.3)
POTASSIUM SERPL-SCNC: 6 MMOL/L (ref 3.5–5.3)
PROCALCITONIN SERPL-MCNC: 0.44 NG/ML
PROT SERPL-MCNC: 6.8 G/DL (ref 6.4–8.2)
RBC # BLD AUTO: 3.06 MILLION/UL (ref 3.88–5.62)
RSV RNA NPH QL NAA+PROBE: NORMAL
SITE: NORMAL
SODIUM SERPL-SCNC: 139 MMOL/L (ref 136–145)
SODIUM SERPL-SCNC: 141 MMOL/L (ref 136–145)
TOTAL CELLS COUNTED SPEC: 100
WBC # BLD AUTO: 11.04 THOUSAND/UL (ref 4.31–10.16)
WBC # FLD MANUAL: 7 /UL

## 2019-12-08 PROCEDURE — 94664 DEMO&/EVAL PT USE INHALER: CPT

## 2019-12-08 PROCEDURE — 80048 BASIC METABOLIC PNL TOTAL CA: CPT | Performed by: INTERNAL MEDICINE

## 2019-12-08 PROCEDURE — 89051 BODY FLUID CELL COUNT: CPT | Performed by: INTERNAL MEDICINE

## 2019-12-08 PROCEDURE — 99255 IP/OBS CONSLTJ NEW/EST HI 80: CPT | Performed by: INTERNAL MEDICINE

## 2019-12-08 PROCEDURE — 82948 REAGENT STRIP/BLOOD GLUCOSE: CPT

## 2019-12-08 PROCEDURE — 94760 N-INVAS EAR/PLS OXIMETRY 1: CPT

## 2019-12-08 PROCEDURE — 93005 ELECTROCARDIOGRAM TRACING: CPT

## 2019-12-08 PROCEDURE — 85025 COMPLETE CBC W/AUTO DIFF WBC: CPT | Performed by: PHYSICIAN ASSISTANT

## 2019-12-08 PROCEDURE — 84145 PROCALCITONIN (PCT): CPT | Performed by: PHYSICIAN ASSISTANT

## 2019-12-08 PROCEDURE — 94640 AIRWAY INHALATION TREATMENT: CPT

## 2019-12-08 PROCEDURE — 80053 COMPREHEN METABOLIC PANEL: CPT | Performed by: PHYSICIAN ASSISTANT

## 2019-12-08 PROCEDURE — 99233 SBSQ HOSP IP/OBS HIGH 50: CPT | Performed by: FAMILY MEDICINE

## 2019-12-08 PROCEDURE — 87040 BLOOD CULTURE FOR BACTERIA: CPT | Performed by: PHYSICIAN ASSISTANT

## 2019-12-08 PROCEDURE — 87631 RESP VIRUS 3-5 TARGETS: CPT | Performed by: PHYSICIAN ASSISTANT

## 2019-12-08 PROCEDURE — 90945 DIALYSIS ONE EVALUATION: CPT | Performed by: INTERNAL MEDICINE

## 2019-12-08 PROCEDURE — 83735 ASSAY OF MAGNESIUM: CPT | Performed by: PHYSICIAN ASSISTANT

## 2019-12-08 PROCEDURE — 99254 IP/OBS CNSLTJ NEW/EST MOD 60: CPT | Performed by: INTERNAL MEDICINE

## 2019-12-08 RX ORDER — DEXTROSE MONOHYDRATE 25 G/50ML
25 INJECTION, SOLUTION INTRAVENOUS ONCE
Status: DISCONTINUED | OUTPATIENT
Start: 2019-12-08 | End: 2019-12-08

## 2019-12-08 RX ORDER — ONDANSETRON 2 MG/ML
4 INJECTION INTRAMUSCULAR; INTRAVENOUS EVERY 6 HOURS PRN
Status: DISCONTINUED | OUTPATIENT
Start: 2019-12-08 | End: 2019-12-16 | Stop reason: HOSPADM

## 2019-12-08 RX ORDER — LANTHANUM CARBONATE 500 MG/1
1000 TABLET, CHEWABLE ORAL
Status: DISCONTINUED | OUTPATIENT
Start: 2019-12-08 | End: 2019-12-16 | Stop reason: HOSPADM

## 2019-12-08 RX ORDER — CINACALCET 30 MG/1
90 TABLET, FILM COATED ORAL
Status: DISCONTINUED | OUTPATIENT
Start: 2019-12-08 | End: 2019-12-16 | Stop reason: HOSPADM

## 2019-12-08 RX ORDER — HYDRALAZINE HYDROCHLORIDE 25 MG/1
100 TABLET, FILM COATED ORAL 3 TIMES DAILY
Status: DISCONTINUED | OUTPATIENT
Start: 2019-12-08 | End: 2019-12-16 | Stop reason: HOSPADM

## 2019-12-08 RX ORDER — METOCLOPRAMIDE HYDROCHLORIDE 5 MG/ML
10 INJECTION INTRAMUSCULAR; INTRAVENOUS ONCE
Status: COMPLETED | OUTPATIENT
Start: 2019-12-08 | End: 2019-12-08

## 2019-12-08 RX ORDER — INSULIN GLARGINE 100 [IU]/ML
10 INJECTION, SOLUTION SUBCUTANEOUS EVERY MORNING
Status: DISCONTINUED | OUTPATIENT
Start: 2019-12-08 | End: 2019-12-09

## 2019-12-08 RX ORDER — HYDROXYZINE HYDROCHLORIDE 10 MG/1
10 TABLET, FILM COATED ORAL
Status: DISCONTINUED | OUTPATIENT
Start: 2019-12-08 | End: 2019-12-16 | Stop reason: HOSPADM

## 2019-12-08 RX ORDER — LABETALOL 20 MG/4 ML (5 MG/ML) INTRAVENOUS SYRINGE
10 ONCE
Status: COMPLETED | OUTPATIENT
Start: 2019-12-08 | End: 2019-12-08

## 2019-12-08 RX ORDER — SODIUM CHLORIDE 9 MG/ML
50 INJECTION, SOLUTION INTRAVENOUS CONTINUOUS
Status: DISCONTINUED | OUTPATIENT
Start: 2019-12-08 | End: 2019-12-10

## 2019-12-08 RX ORDER — LABETALOL 20 MG/4 ML (5 MG/ML) INTRAVENOUS SYRINGE
10 EVERY 4 HOURS PRN
Status: DISCONTINUED | OUTPATIENT
Start: 2019-12-08 | End: 2019-12-15

## 2019-12-08 RX ORDER — METOCLOPRAMIDE HYDROCHLORIDE 5 MG/ML
10 INJECTION INTRAMUSCULAR; INTRAVENOUS EVERY 6 HOURS PRN
Status: DISCONTINUED | OUTPATIENT
Start: 2019-12-08 | End: 2019-12-11

## 2019-12-08 RX ORDER — NIFEDIPINE 30 MG/1
60 TABLET, EXTENDED RELEASE ORAL 2 TIMES DAILY
Status: DISCONTINUED | OUTPATIENT
Start: 2019-12-08 | End: 2019-12-08

## 2019-12-08 RX ORDER — LISINOPRIL 20 MG/1
40 TABLET ORAL DAILY
Status: DISCONTINUED | OUTPATIENT
Start: 2019-12-08 | End: 2019-12-16 | Stop reason: HOSPADM

## 2019-12-08 RX ORDER — CHOLECALCIFEROL (VITAMIN D3) 10 MCG
1 TABLET ORAL DAILY
Status: DISCONTINUED | OUTPATIENT
Start: 2019-12-08 | End: 2019-12-16 | Stop reason: HOSPADM

## 2019-12-08 RX ORDER — MELATONIN
2000 DAILY
Status: DISCONTINUED | OUTPATIENT
Start: 2019-12-08 | End: 2019-12-16 | Stop reason: HOSPADM

## 2019-12-08 RX ORDER — TORSEMIDE 100 MG/1
100 TABLET ORAL EVERY MORNING
Status: DISCONTINUED | OUTPATIENT
Start: 2019-12-08 | End: 2019-12-09

## 2019-12-08 RX ORDER — HYDRALAZINE HYDROCHLORIDE 20 MG/ML
20 INJECTION INTRAMUSCULAR; INTRAVENOUS EVERY 4 HOURS PRN
Status: DISCONTINUED | OUTPATIENT
Start: 2019-12-08 | End: 2019-12-15

## 2019-12-08 RX ORDER — LANOLIN ALCOHOL/MO/W.PET/CERES
6 CREAM (GRAM) TOPICAL
Status: DISCONTINUED | OUTPATIENT
Start: 2019-12-08 | End: 2019-12-16 | Stop reason: HOSPADM

## 2019-12-08 RX ORDER — GABAPENTIN 100 MG/1
100 CAPSULE ORAL
Status: DISCONTINUED | OUTPATIENT
Start: 2019-12-08 | End: 2019-12-16 | Stop reason: HOSPADM

## 2019-12-08 RX ORDER — ALBUTEROL SULFATE 2.5 MG/3ML
5 SOLUTION RESPIRATORY (INHALATION) ONCE
Status: COMPLETED | OUTPATIENT
Start: 2019-12-08 | End: 2019-12-08

## 2019-12-08 RX ORDER — ATORVASTATIN CALCIUM 40 MG/1
40 TABLET, FILM COATED ORAL EVERY EVENING
Status: DISCONTINUED | OUTPATIENT
Start: 2019-12-08 | End: 2019-12-16 | Stop reason: HOSPADM

## 2019-12-08 RX ORDER — PROMETHAZINE HYDROCHLORIDE 25 MG/ML
25 INJECTION, SOLUTION INTRAMUSCULAR; INTRAVENOUS EVERY 6 HOURS PRN
Status: DISCONTINUED | OUTPATIENT
Start: 2019-12-08 | End: 2019-12-08

## 2019-12-08 RX ORDER — HYDRALAZINE HYDROCHLORIDE 20 MG/ML
10 INJECTION INTRAMUSCULAR; INTRAVENOUS EVERY 6 HOURS PRN
Status: DISCONTINUED | OUTPATIENT
Start: 2019-12-08 | End: 2019-12-08

## 2019-12-08 RX ORDER — ESCITALOPRAM OXALATE 10 MG/1
10 TABLET ORAL DAILY
Status: DISCONTINUED | OUTPATIENT
Start: 2019-12-08 | End: 2019-12-16 | Stop reason: HOSPADM

## 2019-12-08 RX ORDER — DEXTROSE MONOHYDRATE 25 G/50ML
12.5 INJECTION, SOLUTION INTRAVENOUS ONCE
Status: COMPLETED | OUTPATIENT
Start: 2019-12-08 | End: 2019-12-08

## 2019-12-08 RX ORDER — ONDANSETRON 2 MG/ML
4 INJECTION INTRAMUSCULAR; INTRAVENOUS ONCE
Status: COMPLETED | OUTPATIENT
Start: 2019-12-08 | End: 2019-12-08

## 2019-12-08 RX ORDER — ASPIRIN 81 MG/1
81 TABLET, CHEWABLE ORAL DAILY
Status: DISCONTINUED | OUTPATIENT
Start: 2019-12-08 | End: 2019-12-16 | Stop reason: HOSPADM

## 2019-12-08 RX ORDER — FAMOTIDINE 20 MG/1
10 TABLET, FILM COATED ORAL DAILY
Status: DISCONTINUED | OUTPATIENT
Start: 2019-12-08 | End: 2019-12-09

## 2019-12-08 RX ORDER — CLONIDINE HYDROCHLORIDE 0.1 MG/1
0.1 TABLET ORAL 3 TIMES DAILY
Status: DISCONTINUED | OUTPATIENT
Start: 2019-12-08 | End: 2019-12-16 | Stop reason: HOSPADM

## 2019-12-08 RX ADMIN — DESMOPRESSIN ACETATE 40 MG: 0.2 TABLET ORAL at 00:31

## 2019-12-08 RX ADMIN — INSULIN LISPRO 3 UNITS: 100 INJECTION, SOLUTION INTRAVENOUS; SUBCUTANEOUS at 12:22

## 2019-12-08 RX ADMIN — GABAPENTIN 100 MG: 100 CAPSULE ORAL at 00:32

## 2019-12-08 RX ADMIN — INSULIN LISPRO 4 UNITS: 100 INJECTION, SOLUTION INTRAVENOUS; SUBCUTANEOUS at 16:05

## 2019-12-08 RX ADMIN — SODIUM CHLORIDE 50 ML/HR: 0.9 INJECTION, SOLUTION INTRAVENOUS at 07:14

## 2019-12-08 RX ADMIN — ONDANSETRON 4 MG: 2 INJECTION INTRAMUSCULAR; INTRAVENOUS at 21:24

## 2019-12-08 RX ADMIN — LABETALOL HYDROCHLORIDE 300 MG: 100 TABLET, FILM COATED ORAL at 00:32

## 2019-12-08 RX ADMIN — ONDANSETRON 4 MG: 2 INJECTION INTRAMUSCULAR; INTRAVENOUS at 15:54

## 2019-12-08 RX ADMIN — HYDRALAZINE HYDROCHLORIDE 10 MG: 20 INJECTION INTRAMUSCULAR; INTRAVENOUS at 16:00

## 2019-12-08 RX ADMIN — HYDRALAZINE HYDROCHLORIDE 100 MG: 25 TABLET ORAL at 00:32

## 2019-12-08 RX ADMIN — LABETALOL 20 MG/4 ML (5 MG/ML) INTRAVENOUS SYRINGE 10 MG: at 17:30

## 2019-12-08 RX ADMIN — GABAPENTIN 100 MG: 100 CAPSULE ORAL at 22:24

## 2019-12-08 RX ADMIN — INSULIN LISPRO 6 UNITS: 100 INJECTION, SOLUTION INTRAVENOUS; SUBCUTANEOUS at 06:41

## 2019-12-08 RX ADMIN — METOCLOPRAMIDE 10 MG: 5 INJECTION, SOLUTION INTRAMUSCULAR; INTRAVENOUS at 22:30

## 2019-12-08 RX ADMIN — ALBUTEROL SULFATE 5 MG: 2.5 SOLUTION RESPIRATORY (INHALATION) at 07:19

## 2019-12-08 RX ADMIN — CLONIDINE HYDROCHLORIDE 0.1 MG: 0.1 TABLET ORAL at 00:31

## 2019-12-08 RX ADMIN — INSULIN GLARGINE 10 UNITS: 100 INJECTION, SOLUTION SUBCUTANEOUS at 08:51

## 2019-12-08 RX ADMIN — LABETALOL HYDROCHLORIDE 300 MG: 100 TABLET, FILM COATED ORAL at 22:23

## 2019-12-08 RX ADMIN — MELATONIN 6 MG: at 22:23

## 2019-12-08 RX ADMIN — HEPARIN SODIUM 5000 UNITS: 5000 INJECTION INTRAVENOUS; SUBCUTANEOUS at 06:14

## 2019-12-08 RX ADMIN — HYDRALAZINE HYDROCHLORIDE 20 MG: 20 INJECTION INTRAMUSCULAR; INTRAVENOUS at 22:58

## 2019-12-08 RX ADMIN — NIFEDIPINE 50 MG: 10 CAPSULE ORAL at 22:25

## 2019-12-08 RX ADMIN — METOCLOPRAMIDE 10 MG: 5 INJECTION, SOLUTION INTRAMUSCULAR; INTRAVENOUS at 16:52

## 2019-12-08 RX ADMIN — NIFEDIPINE 50 MG: 10 CAPSULE ORAL at 00:34

## 2019-12-08 RX ADMIN — ACETAMINOPHEN 650 MG: 325 TABLET, FILM COATED ORAL at 23:04

## 2019-12-08 RX ADMIN — CLONIDINE HYDROCHLORIDE 0.1 MG: 0.1 TABLET ORAL at 22:23

## 2019-12-08 RX ADMIN — CLONIDINE HYDROCHLORIDE 0.1 MG: 0.1 TABLET ORAL at 15:54

## 2019-12-08 RX ADMIN — ONDANSETRON 4 MG: 2 INJECTION INTRAMUSCULAR; INTRAVENOUS at 00:33

## 2019-12-08 RX ADMIN — INSULIN HUMAN 5 UNITS: 100 INJECTION, SOLUTION PARENTERAL at 07:16

## 2019-12-08 RX ADMIN — PROMETHAZINE HYDROCHLORIDE 25 MG: 25 INJECTION INTRAMUSCULAR; INTRAVENOUS at 13:51

## 2019-12-08 RX ADMIN — ONDANSETRON 4 MG: 2 INJECTION INTRAMUSCULAR; INTRAVENOUS at 08:51

## 2019-12-08 RX ADMIN — HYDRALAZINE HYDROCHLORIDE 100 MG: 25 TABLET ORAL at 09:54

## 2019-12-08 RX ADMIN — MELATONIN 6 MG: at 00:32

## 2019-12-08 RX ADMIN — DEXTROSE 50 % IN WATER (D50W) INTRAVENOUS SYRINGE 13 ML: at 07:24

## 2019-12-08 RX ADMIN — HEPARIN SODIUM 5000 UNITS: 5000 INJECTION INTRAVENOUS; SUBCUTANEOUS at 22:24

## 2019-12-08 RX ADMIN — METOCLOPRAMIDE 10 MG: 5 INJECTION, SOLUTION INTRAMUSCULAR; INTRAVENOUS at 03:02

## 2019-12-09 ENCOUNTER — APPOINTMENT (INPATIENT)
Dept: CT IMAGING | Facility: HOSPITAL | Age: 34
DRG: 637 | End: 2019-12-09
Payer: MEDICARE

## 2019-12-09 PROBLEM — E10.10 DIABETIC KETOACIDOSIS ASSOCIATED WITH TYPE 1 DIABETES MELLITUS (HCC): Status: ACTIVE | Noted: 2019-12-09

## 2019-12-09 PROBLEM — R91.1 INCIDENTAL LUNG NODULE, > 3MM AND < 8MM: Status: ACTIVE | Noted: 2019-12-09

## 2019-12-09 LAB
ALBUMIN SERPL BCP-MCNC: 3.1 G/DL (ref 3.5–5)
ALBUMIN SERPL BCP-MCNC: 3.8 G/DL (ref 3.5–5)
ALP SERPL-CCNC: 77 U/L (ref 46–116)
ALP SERPL-CCNC: 82 U/L (ref 46–116)
ALT SERPL W P-5'-P-CCNC: 16 U/L (ref 12–78)
ALT SERPL W P-5'-P-CCNC: 19 U/L (ref 12–78)
ANION GAP SERPL CALCULATED.3IONS-SCNC: 15 MMOL/L (ref 4–13)
ANION GAP SERPL CALCULATED.3IONS-SCNC: 16 MMOL/L (ref 4–13)
ANION GAP SERPL CALCULATED.3IONS-SCNC: 17 MMOL/L (ref 4–13)
ANION GAP SERPL CALCULATED.3IONS-SCNC: 18 MMOL/L (ref 4–13)
ANION GAP SERPL CALCULATED.3IONS-SCNC: 18 MMOL/L (ref 4–13)
ANION GAP SERPL CALCULATED.3IONS-SCNC: 20 MMOL/L (ref 4–13)
ANION GAP SERPL CALCULATED.3IONS-SCNC: 21 MMOL/L (ref 4–13)
ANISOCYTOSIS BLD QL SMEAR: PRESENT
APPEARANCE FLD: CLEAR
AST SERPL W P-5'-P-CCNC: 22 U/L (ref 5–45)
AST SERPL W P-5'-P-CCNC: 22 U/L (ref 5–45)
BACTERIA UR QL AUTO: ABNORMAL /HPF
BASE EXCESS BLDA CALC-SCNC: 4 MMOL/L (ref -2–3)
BASOPHILS # BLD AUTO: 0.06 THOUSANDS/ΜL (ref 0–0.1)
BASOPHILS # BLD MANUAL: 0 THOUSAND/UL (ref 0–0.1)
BASOPHILS NFR BLD AUTO: 0 % (ref 0–1)
BASOPHILS NFR MAR MANUAL: 0 % (ref 0–1)
BETA-HYDROXYBUTYRATE: 4.3 MMOL/L
BILIRUB DIRECT SERPL-MCNC: 0.18 MG/DL (ref 0–0.2)
BILIRUB SERPL-MCNC: 0.44 MG/DL (ref 0.2–1)
BILIRUB SERPL-MCNC: 0.67 MG/DL (ref 0.2–1)
BILIRUB UR QL STRIP: NEGATIVE
BUN SERPL-MCNC: 61 MG/DL (ref 5–25)
BUN SERPL-MCNC: 61 MG/DL (ref 5–25)
BUN SERPL-MCNC: 62 MG/DL (ref 5–25)
BUN SERPL-MCNC: 63 MG/DL (ref 5–25)
BUN SERPL-MCNC: 65 MG/DL (ref 5–25)
CA-I BLD-SCNC: 1.16 MMOL/L (ref 1.12–1.32)
CALCIUM SERPL-MCNC: 10 MG/DL (ref 8.3–10.1)
CALCIUM SERPL-MCNC: 10.2 MG/DL (ref 8.3–10.1)
CALCIUM SERPL-MCNC: 10.2 MG/DL (ref 8.3–10.1)
CALCIUM SERPL-MCNC: 10.3 MG/DL (ref 8.3–10.1)
CALCIUM SERPL-MCNC: 10.4 MG/DL (ref 8.3–10.1)
CALCIUM SERPL-MCNC: 10.4 MG/DL (ref 8.3–10.1)
CALCIUM SERPL-MCNC: 9.9 MG/DL (ref 8.3–10.1)
CHLORIDE SERPL-SCNC: 102 MMOL/L (ref 100–108)
CHLORIDE SERPL-SCNC: 103 MMOL/L (ref 100–108)
CHLORIDE SERPL-SCNC: 103 MMOL/L (ref 100–108)
CHLORIDE SERPL-SCNC: 89 MMOL/L (ref 100–108)
CHLORIDE SERPL-SCNC: 90 MMOL/L (ref 100–108)
CHLORIDE SERPL-SCNC: 94 MMOL/L (ref 100–108)
CHLORIDE SERPL-SCNC: 99 MMOL/L (ref 100–108)
CLARITY UR: CLEAR
CO2 SERPL-SCNC: 25 MMOL/L (ref 21–32)
CO2 SERPL-SCNC: 26 MMOL/L (ref 21–32)
CO2 SERPL-SCNC: 27 MMOL/L (ref 21–32)
CO2 SERPL-SCNC: 28 MMOL/L (ref 21–32)
CO2 SERPL-SCNC: 29 MMOL/L (ref 21–32)
COLOR FLD: COLORLESS
COLOR UR: YELLOW
CREAT SERPL-MCNC: 13.67 MG/DL (ref 0.6–1.3)
CREAT SERPL-MCNC: 13.74 MG/DL (ref 0.6–1.3)
CREAT SERPL-MCNC: 13.75 MG/DL (ref 0.6–1.3)
CREAT SERPL-MCNC: 13.93 MG/DL (ref 0.6–1.3)
CREAT SERPL-MCNC: 15.34 MG/DL (ref 0.6–1.3)
CREAT SERPL-MCNC: 15.65 MG/DL (ref 0.6–1.3)
CREAT SERPL-MCNC: 15.81 MG/DL (ref 0.6–1.3)
EOSINOPHIL # BLD AUTO: 0.03 THOUSAND/ΜL (ref 0–0.61)
EOSINOPHIL # BLD MANUAL: 0 THOUSAND/UL (ref 0–0.4)
EOSINOPHIL NFR BLD AUTO: 0 % (ref 0–6)
EOSINOPHIL NFR BLD MANUAL: 0 % (ref 0–6)
ERYTHROCYTE [DISTWIDTH] IN BLOOD BY AUTOMATED COUNT: 14.7 % (ref 11.6–15.1)
ERYTHROCYTE [DISTWIDTH] IN BLOOD BY AUTOMATED COUNT: 15 % (ref 11.6–15.1)
EST. AVERAGE GLUCOSE BLD GHB EST-MCNC: 120 MG/DL
FIO2 GAS DIL.REBREATH: 21 L
GFR SERPL CREATININE-BSD FRML MDRD: 3 ML/MIN/1.73SQ M
GFR SERPL CREATININE-BSD FRML MDRD: 3 ML/MIN/1.73SQ M
GFR SERPL CREATININE-BSD FRML MDRD: 4 ML/MIN/1.73SQ M
GLUCOSE SERPL-MCNC: 121 MG/DL (ref 65–140)
GLUCOSE SERPL-MCNC: 130 MG/DL (ref 65–140)
GLUCOSE SERPL-MCNC: 205 MG/DL (ref 65–140)
GLUCOSE SERPL-MCNC: 210 MG/DL (ref 65–140)
GLUCOSE SERPL-MCNC: 233 MG/DL (ref 65–140)
GLUCOSE SERPL-MCNC: 310 MG/DL (ref 65–140)
GLUCOSE SERPL-MCNC: 323 MG/DL (ref 65–140)
GLUCOSE SERPL-MCNC: 334 MG/DL (ref 65–140)
GLUCOSE SERPL-MCNC: 337 MG/DL (ref 65–140)
GLUCOSE SERPL-MCNC: 341 MG/DL (ref 65–140)
GLUCOSE SERPL-MCNC: 382 MG/DL (ref 65–140)
GLUCOSE SERPL-MCNC: 412 MG/DL (ref 65–140)
GLUCOSE SERPL-MCNC: 456 MG/DL (ref 65–140)
GLUCOSE SERPL-MCNC: 566 MG/DL (ref 65–140)
GLUCOSE SERPL-MCNC: 566 MG/DL (ref 65–140)
GLUCOSE SERPL-MCNC: 64 MG/DL (ref 65–140)
GLUCOSE SERPL-MCNC: 682 MG/DL (ref 65–140)
GLUCOSE SERPL-MCNC: 712 MG/DL (ref 65–140)
GLUCOSE SERPL-MCNC: 76 MG/DL (ref 65–140)
GLUCOSE SERPL-MCNC: 94 MG/DL (ref 65–140)
GLUCOSE SERPL-MCNC: >500 MG/DL (ref 65–140)
GLUCOSE SERPL-MCNC: >600 MG/DL (ref 65–140)
GLUCOSE UR STRIP-MCNC: ABNORMAL MG/DL
HBA1C MFR BLD: 5.8 % (ref 4.2–6.3)
HCO3 BLDA-SCNC: 28.6 MMOL/L (ref 22–28)
HCT VFR BLD AUTO: 27.7 % (ref 36.5–49.3)
HCT VFR BLD AUTO: 31.2 % (ref 36.5–49.3)
HCT VFR BLD CALC: 33 % (ref 36.5–49.3)
HGB BLD-MCNC: 10.1 G/DL (ref 12–17)
HGB BLD-MCNC: 8.7 G/DL (ref 12–17)
HGB BLDA-MCNC: 11.2 G/DL (ref 12–17)
HGB UR QL STRIP.AUTO: NEGATIVE
HYPERCHROMIA BLD QL SMEAR: PRESENT
IMM GRANULOCYTES # BLD AUTO: 0.11 THOUSAND/UL (ref 0–0.2)
IMM GRANULOCYTES NFR BLD AUTO: 1 % (ref 0–2)
KETONES UR STRIP-MCNC: NEGATIVE MG/DL
LACTATE SERPL-SCNC: 1.4 MMOL/L (ref 0.5–2)
LACTATE SERPL-SCNC: 1.5 MMOL/L (ref 0.5–2)
LACTATE SERPL-SCNC: 1.7 MMOL/L (ref 0.5–2)
LEUKOCYTE ESTERASE UR QL STRIP: NEGATIVE
LG PLATELETS BLD QL SMEAR: PRESENT
LIPASE SERPL-CCNC: 71 U/L (ref 73–393)
LYMPHOCYTES # BLD AUTO: 1.07 THOUSAND/UL (ref 0.6–4.47)
LYMPHOCYTES # BLD AUTO: 1.38 THOUSANDS/ΜL (ref 0.6–4.47)
LYMPHOCYTES # BLD AUTO: 6 % (ref 14–44)
LYMPHOCYTES NFR BLD AUTO: 52 %
LYMPHOCYTES NFR BLD AUTO: 7 % (ref 14–44)
MACROCYTES BLD QL AUTO: PRESENT
MAGNESIUM SERPL-MCNC: 2.3 MG/DL (ref 1.6–2.6)
MAGNESIUM SERPL-MCNC: 2.6 MG/DL (ref 1.6–2.6)
MAGNESIUM SERPL-MCNC: 2.8 MG/DL (ref 1.6–2.6)
MAGNESIUM SERPL-MCNC: 2.8 MG/DL (ref 1.6–2.6)
MAGNESIUM SERPL-MCNC: 2.9 MG/DL (ref 1.6–2.6)
MAGNESIUM SERPL-MCNC: 3.1 MG/DL (ref 1.6–2.6)
MCH RBC QN AUTO: 32.5 PG (ref 26.8–34.3)
MCH RBC QN AUTO: 32.7 PG (ref 26.8–34.3)
MCHC RBC AUTO-ENTMCNC: 31.4 G/DL (ref 31.4–37.4)
MCHC RBC AUTO-ENTMCNC: 32.4 G/DL (ref 31.4–37.4)
MCV RBC AUTO: 101 FL (ref 82–98)
MCV RBC AUTO: 103 FL (ref 82–98)
MONOCYTES # BLD AUTO: 0.68 THOUSAND/ΜL (ref 0.17–1.22)
MONOCYTES # BLD AUTO: 0.89 THOUSAND/UL (ref 0–1.22)
MONOCYTES NFR BLD AUTO: 3 % (ref 4–12)
MONOCYTES NFR BLD AUTO: 40 %
MONOCYTES NFR BLD: 5 % (ref 4–12)
NEUTROPHILS # BLD AUTO: 17.73 THOUSANDS/ΜL (ref 1.85–7.62)
NEUTROPHILS # BLD MANUAL: 15.89 THOUSAND/UL (ref 1.85–7.62)
NEUTS BAND NFR BLD MANUAL: 1 % (ref 0–8)
NEUTS SEG NFR BLD AUTO: 8 %
NEUTS SEG NFR BLD AUTO: 88 % (ref 43–75)
NEUTS SEG NFR BLD AUTO: 89 % (ref 43–75)
NITRITE UR QL STRIP: NEGATIVE
NON-SQ EPI CELLS URNS QL MICRO: ABNORMAL /HPF
NRBC BLD AUTO-RTO: 0 /100 WBCS
NRBC BLD AUTO-RTO: 0 /100 WBCS
OSMOLALITY UR/SERPL-RTO: 345 MMOL/KG (ref 282–298)
PCO2 BLD: 30 MMOL/L (ref 21–32)
PCO2 BLD: 44 MM HG (ref 36–44)
PH BLD: 7.42 [PH] (ref 7.35–7.45)
PH UR STRIP.AUTO: 7.5 [PH]
PHOSPHATE SERPL-MCNC: 5.8 MG/DL (ref 2.7–4.5)
PHOSPHATE SERPL-MCNC: 5.9 MG/DL (ref 2.7–4.5)
PHOSPHATE SERPL-MCNC: 6.4 MG/DL (ref 2.7–4.5)
PHOSPHATE SERPL-MCNC: 6.6 MG/DL (ref 2.7–4.5)
PHOSPHATE SERPL-MCNC: 7.6 MG/DL (ref 2.7–4.5)
PHOSPHATE SERPL-MCNC: 9.1 MG/DL (ref 2.7–4.5)
PLATELET # BLD AUTO: 324 THOUSANDS/UL (ref 149–390)
PLATELET # BLD AUTO: 380 THOUSANDS/UL (ref 149–390)
PLATELET BLD QL SMEAR: ADEQUATE
PMV BLD AUTO: 10.2 FL (ref 8.9–12.7)
PMV BLD AUTO: 9.8 FL (ref 8.9–12.7)
PO2 BLD: 79 MM HG (ref 75–129)
POLYCHROMASIA BLD QL SMEAR: PRESENT
POTASSIUM BLD-SCNC: 6.1 MMOL/L (ref 3.5–5.3)
POTASSIUM SERPL-SCNC: 4.5 MMOL/L (ref 3.5–5.3)
POTASSIUM SERPL-SCNC: 4.6 MMOL/L (ref 3.5–5.3)
POTASSIUM SERPL-SCNC: 4.6 MMOL/L (ref 3.5–5.3)
POTASSIUM SERPL-SCNC: 4.8 MMOL/L (ref 3.5–5.3)
POTASSIUM SERPL-SCNC: 5.6 MMOL/L (ref 3.5–5.3)
POTASSIUM SERPL-SCNC: 5.6 MMOL/L (ref 3.5–5.3)
POTASSIUM SERPL-SCNC: 6.2 MMOL/L (ref 3.5–5.3)
PROCALCITONIN SERPL-MCNC: 1.24 NG/ML
PROT SERPL-MCNC: 6 G/DL (ref 6.4–8.2)
PROT SERPL-MCNC: 7 G/DL (ref 6.4–8.2)
PROT UR STRIP-MCNC: >=300 MG/DL
RBC # BLD AUTO: 2.68 MILLION/UL (ref 3.88–5.62)
RBC # BLD AUTO: 3.09 MILLION/UL (ref 3.88–5.62)
RBC #/AREA URNS AUTO: ABNORMAL /HPF
SAO2 % BLD FROM PO2: 96 % (ref 60–85)
SITE: NORMAL
SODIUM BLD-SCNC: 131 MMOL/L (ref 136–145)
SODIUM SERPL-SCNC: 137 MMOL/L (ref 136–145)
SODIUM SERPL-SCNC: 139 MMOL/L (ref 136–145)
SODIUM SERPL-SCNC: 141 MMOL/L (ref 136–145)
SODIUM SERPL-SCNC: 144 MMOL/L (ref 136–145)
SODIUM SERPL-SCNC: 144 MMOL/L (ref 136–145)
SODIUM SERPL-SCNC: 145 MMOL/L (ref 136–145)
SODIUM SERPL-SCNC: 148 MMOL/L (ref 136–145)
SP GR UR STRIP.AUTO: 1.01 (ref 1–1.03)
SPECIMEN SOURCE: ABNORMAL
TOTAL CELLS COUNTED SPEC: 100
TOTAL CELLS COUNTED SPEC: 60
TROPONIN I SERPL-MCNC: 0.13 NG/ML
TROPONIN I SERPL-MCNC: 0.15 NG/ML
TROPONIN I SERPL-MCNC: 0.18 NG/ML
TROPONIN I SERPL-MCNC: 0.19 NG/ML
TROPONIN I SERPL-MCNC: 0.2 NG/ML
UROBILINOGEN UR QL STRIP.AUTO: 0.2 E.U./DL
WBC # BLD AUTO: 17.85 THOUSAND/UL (ref 4.31–10.16)
WBC # BLD AUTO: 19.99 THOUSAND/UL (ref 4.31–10.16)
WBC # FLD MANUAL: 6 /UL
WBC #/AREA URNS AUTO: ABNORMAL /HPF

## 2019-12-09 PROCEDURE — 80048 BASIC METABOLIC PNL TOTAL CA: CPT | Performed by: INTERNAL MEDICINE

## 2019-12-09 PROCEDURE — 99291 CRITICAL CARE FIRST HOUR: CPT | Performed by: INTERNAL MEDICINE

## 2019-12-09 PROCEDURE — 84295 ASSAY OF SERUM SODIUM: CPT

## 2019-12-09 PROCEDURE — 83735 ASSAY OF MAGNESIUM: CPT | Performed by: INTERNAL MEDICINE

## 2019-12-09 PROCEDURE — 83735 ASSAY OF MAGNESIUM: CPT | Performed by: PHYSICIAN ASSISTANT

## 2019-12-09 PROCEDURE — 82948 REAGENT STRIP/BLOOD GLUCOSE: CPT

## 2019-12-09 PROCEDURE — 74176 CT ABD & PELVIS W/O CONTRAST: CPT

## 2019-12-09 PROCEDURE — NC001 PR NO CHARGE: Performed by: PHYSICIAN ASSISTANT

## 2019-12-09 PROCEDURE — 36600 WITHDRAWAL OF ARTERIAL BLOOD: CPT

## 2019-12-09 PROCEDURE — 82010 KETONE BODYS QUAN: CPT | Performed by: PHYSICIAN ASSISTANT

## 2019-12-09 PROCEDURE — 80076 HEPATIC FUNCTION PANEL: CPT | Performed by: PHYSICIAN ASSISTANT

## 2019-12-09 PROCEDURE — 83605 ASSAY OF LACTIC ACID: CPT | Performed by: PHYSICIAN ASSISTANT

## 2019-12-09 PROCEDURE — 87205 SMEAR GRAM STAIN: CPT | Performed by: PHYSICIAN ASSISTANT

## 2019-12-09 PROCEDURE — 84484 ASSAY OF TROPONIN QUANT: CPT | Performed by: PHYSICIAN ASSISTANT

## 2019-12-09 PROCEDURE — 84100 ASSAY OF PHOSPHORUS: CPT | Performed by: INTERNAL MEDICINE

## 2019-12-09 PROCEDURE — 99232 SBSQ HOSP IP/OBS MODERATE 35: CPT | Performed by: INTERNAL MEDICINE

## 2019-12-09 PROCEDURE — 82803 BLOOD GASES ANY COMBINATION: CPT

## 2019-12-09 PROCEDURE — 85014 HEMATOCRIT: CPT

## 2019-12-09 PROCEDURE — 87040 BLOOD CULTURE FOR BACTERIA: CPT | Performed by: PHYSICIAN ASSISTANT

## 2019-12-09 PROCEDURE — 82947 ASSAY GLUCOSE BLOOD QUANT: CPT | Performed by: INTERNAL MEDICINE

## 2019-12-09 PROCEDURE — 82947 ASSAY GLUCOSE BLOOD QUANT: CPT

## 2019-12-09 PROCEDURE — 84145 PROCALCITONIN (PCT): CPT | Performed by: PHYSICIAN ASSISTANT

## 2019-12-09 PROCEDURE — 80048 BASIC METABOLIC PNL TOTAL CA: CPT | Performed by: PHYSICIAN ASSISTANT

## 2019-12-09 PROCEDURE — 84132 ASSAY OF SERUM POTASSIUM: CPT

## 2019-12-09 PROCEDURE — 85007 BL SMEAR W/DIFF WBC COUNT: CPT | Performed by: PHYSICIAN ASSISTANT

## 2019-12-09 PROCEDURE — 81001 URINALYSIS AUTO W/SCOPE: CPT | Performed by: PHYSICIAN ASSISTANT

## 2019-12-09 PROCEDURE — 83690 ASSAY OF LIPASE: CPT | Performed by: PHYSICIAN ASSISTANT

## 2019-12-09 PROCEDURE — 70450 CT HEAD/BRAIN W/O DYE: CPT

## 2019-12-09 PROCEDURE — 83930 ASSAY OF BLOOD OSMOLALITY: CPT | Performed by: PHYSICIAN ASSISTANT

## 2019-12-09 PROCEDURE — 84100 ASSAY OF PHOSPHORUS: CPT | Performed by: PHYSICIAN ASSISTANT

## 2019-12-09 PROCEDURE — 74174 CTA ABD&PLVS W/CONTRAST: CPT

## 2019-12-09 PROCEDURE — 83036 HEMOGLOBIN GLYCOSYLATED A1C: CPT | Performed by: PHYSICIAN ASSISTANT

## 2019-12-09 PROCEDURE — 80053 COMPREHEN METABOLIC PANEL: CPT | Performed by: PHYSICIAN ASSISTANT

## 2019-12-09 PROCEDURE — 87070 CULTURE OTHR SPECIMN AEROBIC: CPT | Performed by: PHYSICIAN ASSISTANT

## 2019-12-09 PROCEDURE — 93005 ELECTROCARDIOGRAM TRACING: CPT

## 2019-12-09 PROCEDURE — 85027 COMPLETE CBC AUTOMATED: CPT | Performed by: PHYSICIAN ASSISTANT

## 2019-12-09 PROCEDURE — 85025 COMPLETE CBC W/AUTO DIFF WBC: CPT | Performed by: PHYSICIAN ASSISTANT

## 2019-12-09 PROCEDURE — 89051 BODY FLUID CELL COUNT: CPT | Performed by: PHYSICIAN ASSISTANT

## 2019-12-09 PROCEDURE — 82330 ASSAY OF CALCIUM: CPT

## 2019-12-09 RX ORDER — SODIUM CHLORIDE 9 MG/ML
250 INJECTION, SOLUTION INTRAVENOUS CONTINUOUS
Status: DISCONTINUED | OUTPATIENT
Start: 2019-12-09 | End: 2019-12-09

## 2019-12-09 RX ORDER — SODIUM CHLORIDE, SODIUM GLUCONATE, SODIUM ACETATE, POTASSIUM CHLORIDE, MAGNESIUM CHLORIDE, SODIUM PHOSPHATE, DIBASIC, AND POTASSIUM PHOSPHATE .53; .5; .37; .037; .03; .012; .00082 G/100ML; G/100ML; G/100ML; G/100ML; G/100ML; G/100ML; G/100ML
500 INJECTION, SOLUTION INTRAVENOUS ONCE
Status: COMPLETED | OUTPATIENT
Start: 2019-12-09 | End: 2019-12-09

## 2019-12-09 RX ORDER — SODIUM CHLORIDE, SODIUM GLUCONATE, SODIUM ACETATE, POTASSIUM CHLORIDE, MAGNESIUM CHLORIDE, SODIUM PHOSPHATE, DIBASIC, AND POTASSIUM PHOSPHATE .53; .5; .37; .037; .03; .012; .00082 G/100ML; G/100ML; G/100ML; G/100ML; G/100ML; G/100ML; G/100ML
250 INJECTION, SOLUTION INTRAVENOUS CONTINUOUS
Status: DISPENSED | OUTPATIENT
Start: 2019-12-09 | End: 2019-12-09

## 2019-12-09 RX ORDER — POTASSIUM CHLORIDE 14.9 MG/ML
20 INJECTION INTRAVENOUS ONCE
Status: COMPLETED | OUTPATIENT
Start: 2019-12-09 | End: 2019-12-09

## 2019-12-09 RX ORDER — DEXTROSE AND SODIUM CHLORIDE 5; .9 G/100ML; G/100ML
250 INJECTION, SOLUTION INTRAVENOUS CONTINUOUS
Status: DISCONTINUED | OUTPATIENT
Start: 2019-12-09 | End: 2019-12-10

## 2019-12-09 RX ORDER — CHLORHEXIDINE GLUCONATE 0.12 MG/ML
15 RINSE ORAL EVERY 12 HOURS SCHEDULED
Status: DISCONTINUED | OUTPATIENT
Start: 2019-12-09 | End: 2019-12-11

## 2019-12-09 RX ORDER — SODIUM CHLORIDE 9 MG/ML
500 INJECTION, SOLUTION INTRAVENOUS CONTINUOUS
Status: DISPENSED | OUTPATIENT
Start: 2019-12-09 | End: 2019-12-09

## 2019-12-09 RX ORDER — SODIUM CHLORIDE 9 MG/ML
1000 INJECTION, SOLUTION INTRAVENOUS CONTINUOUS
Status: DISPENSED | OUTPATIENT
Start: 2019-12-09 | End: 2019-12-09

## 2019-12-09 RX ORDER — HYDROMORPHONE HCL/PF 1 MG/ML
0.5 SYRINGE (ML) INJECTION ONCE
Status: COMPLETED | OUTPATIENT
Start: 2019-12-09 | End: 2019-12-09

## 2019-12-09 RX ORDER — LIDOCAINE HYDROCHLORIDE 20 MG/ML
1 JELLY TOPICAL ONCE
Status: COMPLETED | OUTPATIENT
Start: 2019-12-10 | End: 2019-12-10

## 2019-12-09 RX ORDER — INSULIN GLARGINE 100 [IU]/ML
10 INJECTION, SOLUTION SUBCUTANEOUS EVERY 12 HOURS SCHEDULED
Status: DISCONTINUED | OUTPATIENT
Start: 2019-12-09 | End: 2019-12-10

## 2019-12-09 RX ORDER — POTASSIUM CHLORIDE 20 MEQ/1
20 TABLET, EXTENDED RELEASE ORAL ONCE
Status: COMPLETED | OUTPATIENT
Start: 2019-12-09 | End: 2019-12-09

## 2019-12-09 RX ORDER — HYDROMORPHONE HCL/PF 1 MG/ML
0.5 SYRINGE (ML) INJECTION EVERY 4 HOURS PRN
Status: DISCONTINUED | OUTPATIENT
Start: 2019-12-09 | End: 2019-12-16 | Stop reason: HOSPADM

## 2019-12-09 RX ORDER — PANTOPRAZOLE SODIUM 40 MG/1
40 INJECTION, POWDER, FOR SOLUTION INTRAVENOUS EVERY 12 HOURS SCHEDULED
Status: DISCONTINUED | OUTPATIENT
Start: 2019-12-10 | End: 2019-12-11

## 2019-12-09 RX ORDER — SODIUM CHLORIDE, SODIUM GLUCONATE, SODIUM ACETATE, POTASSIUM CHLORIDE, MAGNESIUM CHLORIDE, SODIUM PHOSPHATE, DIBASIC, AND POTASSIUM PHOSPHATE .53; .5; .37; .037; .03; .012; .00082 G/100ML; G/100ML; G/100ML; G/100ML; G/100ML; G/100ML; G/100ML
100 INJECTION, SOLUTION INTRAVENOUS CONTINUOUS
Status: DISCONTINUED | OUTPATIENT
Start: 2019-12-10 | End: 2019-12-10

## 2019-12-09 RX ADMIN — ONDANSETRON 4 MG: 2 INJECTION INTRAMUSCULAR; INTRAVENOUS at 02:44

## 2019-12-09 RX ADMIN — SODIUM CHLORIDE 10 UNITS/HR: 9 INJECTION, SOLUTION INTRAVENOUS at 13:02

## 2019-12-09 RX ADMIN — SODIUM CHLORIDE 1000 ML/HR: 0.9 INJECTION, SOLUTION INTRAVENOUS at 05:36

## 2019-12-09 RX ADMIN — IOHEXOL 100 ML: 350 INJECTION, SOLUTION INTRAVENOUS at 22:41

## 2019-12-09 RX ADMIN — DEXTROSE AND SODIUM CHLORIDE 250 ML/HR: 5; .9 INJECTION, SOLUTION INTRAVENOUS at 12:18

## 2019-12-09 RX ADMIN — SODIUM CHLORIDE 10 MG/HR: 0.9 INJECTION, SOLUTION INTRAVENOUS at 23:39

## 2019-12-09 RX ADMIN — LABETALOL 20 MG/4 ML (5 MG/ML) INTRAVENOUS SYRINGE 10 MG: at 02:44

## 2019-12-09 RX ADMIN — SODIUM CHLORIDE, SODIUM GLUCONATE, SODIUM ACETATE, POTASSIUM CHLORIDE, MAGNESIUM CHLORIDE, SODIUM PHOSPHATE, DIBASIC, AND POTASSIUM PHOSPHATE 250 ML/HR: .53; .5; .37; .037; .03; .012; .00082 INJECTION, SOLUTION INTRAVENOUS at 10:33

## 2019-12-09 RX ADMIN — SODIUM CHLORIDE 10.2 UNITS/HR: 9 INJECTION, SOLUTION INTRAVENOUS at 05:35

## 2019-12-09 RX ADMIN — METOCLOPRAMIDE 10 MG: 5 INJECTION, SOLUTION INTRAMUSCULAR; INTRAVENOUS at 14:04

## 2019-12-09 RX ADMIN — INSULIN HUMAN 8 UNITS: 100 INJECTION, SOLUTION PARENTERAL at 02:55

## 2019-12-09 RX ADMIN — CEFEPIME HYDROCHLORIDE 1000 MG: 1 INJECTION, POWDER, FOR SOLUTION INTRAMUSCULAR; INTRAVENOUS at 22:18

## 2019-12-09 RX ADMIN — POTASSIUM CHLORIDE 20 MEQ: 14.9 INJECTION, SOLUTION INTRAVENOUS at 15:32

## 2019-12-09 RX ADMIN — HEPARIN SODIUM 5000 UNITS: 5000 INJECTION INTRAVENOUS; SUBCUTANEOUS at 06:17

## 2019-12-09 RX ADMIN — METOCLOPRAMIDE 10 MG: 5 INJECTION, SOLUTION INTRAMUSCULAR; INTRAVENOUS at 20:22

## 2019-12-09 RX ADMIN — SODIUM CHLORIDE 50 ML/HR: 0.9 INJECTION, SOLUTION INTRAVENOUS at 02:49

## 2019-12-09 RX ADMIN — ACETAMINOPHEN 650 MG: 325 TABLET, FILM COATED ORAL at 19:53

## 2019-12-09 RX ADMIN — HYDROMORPHONE HYDROCHLORIDE 0.5 MG: 1 INJECTION, SOLUTION INTRAMUSCULAR; INTRAVENOUS; SUBCUTANEOUS at 17:45

## 2019-12-09 RX ADMIN — DEXTROSE AND SODIUM CHLORIDE 250 ML/HR: 5; .9 INJECTION, SOLUTION INTRAVENOUS at 21:07

## 2019-12-09 RX ADMIN — SODIUM CHLORIDE 10 MG/HR: 0.9 INJECTION, SOLUTION INTRAVENOUS at 21:19

## 2019-12-09 RX ADMIN — POTASSIUM CHLORIDE 20 MEQ: 14.9 INJECTION, SOLUTION INTRAVENOUS at 13:02

## 2019-12-09 RX ADMIN — ONDANSETRON 4 MG: 2 INJECTION INTRAMUSCULAR; INTRAVENOUS at 12:11

## 2019-12-09 RX ADMIN — SODIUM CHLORIDE, SODIUM GLUCONATE, SODIUM ACETATE, POTASSIUM CHLORIDE, MAGNESIUM CHLORIDE, SODIUM PHOSPHATE, DIBASIC, AND POTASSIUM PHOSPHATE 500 ML: .53; .5; .37; .037; .03; .012; .00082 INJECTION, SOLUTION INTRAVENOUS at 21:20

## 2019-12-09 RX ADMIN — SODIUM CHLORIDE 5 MG/HR: 0.9 INJECTION, SOLUTION INTRAVENOUS at 05:30

## 2019-12-09 RX ADMIN — METOCLOPRAMIDE 10 MG: 5 INJECTION, SOLUTION INTRAMUSCULAR; INTRAVENOUS at 04:27

## 2019-12-09 RX ADMIN — SODIUM CHLORIDE 500 ML/HR: 0.9 INJECTION, SOLUTION INTRAVENOUS at 06:39

## 2019-12-09 RX ADMIN — VANCOMYCIN HYDROCHLORIDE 1500 MG: 1 INJECTION, POWDER, LYOPHILIZED, FOR SOLUTION INTRAVENOUS at 23:39

## 2019-12-09 RX ADMIN — HYDRALAZINE HYDROCHLORIDE 20 MG: 20 INJECTION INTRAMUSCULAR; INTRAVENOUS at 20:22

## 2019-12-09 RX ADMIN — SODIUM CHLORIDE 7.5 MG/HR: 0.9 INJECTION, SOLUTION INTRAVENOUS at 08:35

## 2019-12-09 RX ADMIN — HEPARIN SODIUM 5000 UNITS: 5000 INJECTION INTRAVENOUS; SUBCUTANEOUS at 13:02

## 2019-12-09 RX ADMIN — HYDROMORPHONE HYDROCHLORIDE 0.5 MG: 1 INJECTION, SOLUTION INTRAMUSCULAR; INTRAVENOUS; SUBCUTANEOUS at 03:06

## 2019-12-10 LAB
AMORPH URATE CRY URNS QL MICRO: ABNORMAL /HPF
ANION GAP SERPL CALCULATED.3IONS-SCNC: 12 MMOL/L (ref 4–13)
ANION GAP SERPL CALCULATED.3IONS-SCNC: 13 MMOL/L (ref 4–13)
ANION GAP SERPL CALCULATED.3IONS-SCNC: 14 MMOL/L (ref 4–13)
BACTERIA UR QL AUTO: ABNORMAL /HPF
BILIRUB UR QL STRIP: NEGATIVE
BUN SERPL-MCNC: 50 MG/DL (ref 5–25)
BUN SERPL-MCNC: 53 MG/DL (ref 5–25)
BUN SERPL-MCNC: 55 MG/DL (ref 5–25)
BUN SERPL-MCNC: 57 MG/DL (ref 5–25)
BUN SERPL-MCNC: 60 MG/DL (ref 5–25)
CALCIUM SERPL-MCNC: 8.9 MG/DL (ref 8.3–10.1)
CALCIUM SERPL-MCNC: 9 MG/DL (ref 8.3–10.1)
CALCIUM SERPL-MCNC: 9.4 MG/DL (ref 8.3–10.1)
CALCIUM SERPL-MCNC: 9.5 MG/DL (ref 8.3–10.1)
CALCIUM SERPL-MCNC: 9.8 MG/DL (ref 8.3–10.1)
CHLORIDE SERPL-SCNC: 106 MMOL/L (ref 100–108)
CHLORIDE SERPL-SCNC: 107 MMOL/L (ref 100–108)
CHLORIDE SERPL-SCNC: 107 MMOL/L (ref 100–108)
CHLORIDE SERPL-SCNC: 108 MMOL/L (ref 100–108)
CHLORIDE SERPL-SCNC: 109 MMOL/L (ref 100–108)
CLARITY UR: ABNORMAL
CO2 SERPL-SCNC: 23 MMOL/L (ref 21–32)
CO2 SERPL-SCNC: 24 MMOL/L (ref 21–32)
CO2 SERPL-SCNC: 26 MMOL/L (ref 21–32)
CO2 SERPL-SCNC: 26 MMOL/L (ref 21–32)
CO2 SERPL-SCNC: 27 MMOL/L (ref 21–32)
COLOR UR: YELLOW
CREAT SERPL-MCNC: 11.55 MG/DL (ref 0.6–1.3)
CREAT SERPL-MCNC: 11.95 MG/DL (ref 0.6–1.3)
CREAT SERPL-MCNC: 12 MG/DL (ref 0.6–1.3)
CREAT SERPL-MCNC: 12.96 MG/DL (ref 0.6–1.3)
CREAT SERPL-MCNC: 13.46 MG/DL (ref 0.6–1.3)
GFR SERPL CREATININE-BSD FRML MDRD: 4 ML/MIN/1.73SQ M
GFR SERPL CREATININE-BSD FRML MDRD: 4 ML/MIN/1.73SQ M
GFR SERPL CREATININE-BSD FRML MDRD: 5 ML/MIN/1.73SQ M
GLUCOSE SERPL-MCNC: 109 MG/DL (ref 65–140)
GLUCOSE SERPL-MCNC: 133 MG/DL (ref 65–140)
GLUCOSE SERPL-MCNC: 143 MG/DL (ref 65–140)
GLUCOSE SERPL-MCNC: 164 MG/DL (ref 65–140)
GLUCOSE SERPL-MCNC: 176 MG/DL (ref 65–140)
GLUCOSE SERPL-MCNC: 182 MG/DL (ref 65–140)
GLUCOSE SERPL-MCNC: 195 MG/DL (ref 65–140)
GLUCOSE SERPL-MCNC: 196 MG/DL (ref 65–140)
GLUCOSE SERPL-MCNC: 199 MG/DL (ref 65–140)
GLUCOSE SERPL-MCNC: 203 MG/DL (ref 65–140)
GLUCOSE SERPL-MCNC: 205 MG/DL (ref 65–140)
GLUCOSE SERPL-MCNC: 206 MG/DL (ref 65–140)
GLUCOSE SERPL-MCNC: 206 MG/DL (ref 65–140)
GLUCOSE SERPL-MCNC: 210 MG/DL (ref 65–140)
GLUCOSE SERPL-MCNC: 215 MG/DL (ref 65–140)
GLUCOSE SERPL-MCNC: 218 MG/DL (ref 65–140)
GLUCOSE SERPL-MCNC: 233 MG/DL (ref 65–140)
GLUCOSE SERPL-MCNC: 265 MG/DL (ref 65–140)
GLUCOSE SERPL-MCNC: 271 MG/DL (ref 65–140)
GLUCOSE SERPL-MCNC: 286 MG/DL (ref 65–140)
GLUCOSE SERPL-MCNC: 312 MG/DL (ref 65–140)
GLUCOSE SERPL-MCNC: 48 MG/DL (ref 65–140)
GLUCOSE SERPL-MCNC: 48 MG/DL (ref 65–140)
GLUCOSE SERPL-MCNC: 57 MG/DL (ref 65–140)
GLUCOSE SERPL-MCNC: 70 MG/DL (ref 65–140)
GLUCOSE SERPL-MCNC: 73 MG/DL (ref 65–140)
GLUCOSE SERPL-MCNC: 76 MG/DL (ref 65–140)
GLUCOSE SERPL-MCNC: 79 MG/DL (ref 65–140)
GLUCOSE SERPL-MCNC: 92 MG/DL (ref 65–140)
GLUCOSE SERPL-MCNC: 94 MG/DL (ref 65–140)
GLUCOSE UR STRIP-MCNC: ABNORMAL MG/DL
HGB UR QL STRIP.AUTO: ABNORMAL
KETONES UR STRIP-MCNC: NEGATIVE MG/DL
L PNEUMO1 AG UR QL IA.RAPID: NEGATIVE
LEUKOCYTE ESTERASE UR QL STRIP: NEGATIVE
MAGNESIUM SERPL-MCNC: 1.9 MG/DL (ref 1.6–2.6)
MAGNESIUM SERPL-MCNC: 2 MG/DL (ref 1.6–2.6)
MAGNESIUM SERPL-MCNC: 2.2 MG/DL (ref 1.6–2.6)
MAGNESIUM SERPL-MCNC: 2.2 MG/DL (ref 1.6–2.6)
MAGNESIUM SERPL-MCNC: 2.5 MG/DL (ref 1.6–2.6)
NITRITE UR QL STRIP: NEGATIVE
NON-SQ EPI CELLS URNS QL MICRO: ABNORMAL /HPF
PH UR STRIP.AUTO: 6 [PH]
PHOSPHATE SERPL-MCNC: 5.1 MG/DL (ref 2.7–4.5)
PHOSPHATE SERPL-MCNC: 5.2 MG/DL (ref 2.7–4.5)
PHOSPHATE SERPL-MCNC: 5.4 MG/DL (ref 2.7–4.5)
PHOSPHATE SERPL-MCNC: 5.8 MG/DL (ref 2.7–4.5)
PHOSPHATE SERPL-MCNC: 6.1 MG/DL (ref 2.7–4.5)
POTASSIUM SERPL-SCNC: 4.5 MMOL/L (ref 3.5–5.3)
POTASSIUM SERPL-SCNC: 4.6 MMOL/L (ref 3.5–5.3)
POTASSIUM SERPL-SCNC: 4.6 MMOL/L (ref 3.5–5.3)
PROT UR STRIP-MCNC: >=300 MG/DL
PTH-INTACT SERPL-MCNC: 389.7 PG/ML (ref 18.4–80.1)
RBC #/AREA URNS AUTO: ABNORMAL /HPF
S PNEUM AG UR QL: NEGATIVE
SODIUM SERPL-SCNC: 142 MMOL/L (ref 136–145)
SODIUM SERPL-SCNC: 145 MMOL/L (ref 136–145)
SODIUM SERPL-SCNC: 145 MMOL/L (ref 136–145)
SODIUM SERPL-SCNC: 147 MMOL/L (ref 136–145)
SODIUM SERPL-SCNC: 147 MMOL/L (ref 136–145)
SP GR UR STRIP.AUTO: 1.02 (ref 1–1.03)
UROBILINOGEN UR QL STRIP.AUTO: 0.2 E.U./DL
WBC #/AREA URNS AUTO: ABNORMAL /HPF

## 2019-12-10 PROCEDURE — 81001 URINALYSIS AUTO W/SCOPE: CPT | Performed by: PHYSICIAN ASSISTANT

## 2019-12-10 PROCEDURE — 80048 BASIC METABOLIC PNL TOTAL CA: CPT | Performed by: INTERNAL MEDICINE

## 2019-12-10 PROCEDURE — 99233 SBSQ HOSP IP/OBS HIGH 50: CPT | Performed by: INTERNAL MEDICINE

## 2019-12-10 PROCEDURE — 84100 ASSAY OF PHOSPHORUS: CPT | Performed by: INTERNAL MEDICINE

## 2019-12-10 PROCEDURE — C9113 INJ PANTOPRAZOLE SODIUM, VIA: HCPCS | Performed by: PHYSICIAN ASSISTANT

## 2019-12-10 PROCEDURE — 99232 SBSQ HOSP IP/OBS MODERATE 35: CPT | Performed by: INTERNAL MEDICINE

## 2019-12-10 PROCEDURE — 83735 ASSAY OF MAGNESIUM: CPT | Performed by: INTERNAL MEDICINE

## 2019-12-10 PROCEDURE — 99255 IP/OBS CONSLTJ NEW/EST HI 80: CPT | Performed by: INTERNAL MEDICINE

## 2019-12-10 PROCEDURE — 87086 URINE CULTURE/COLONY COUNT: CPT | Performed by: PHYSICIAN ASSISTANT

## 2019-12-10 PROCEDURE — 82948 REAGENT STRIP/BLOOD GLUCOSE: CPT

## 2019-12-10 PROCEDURE — 83970 ASSAY OF PARATHORMONE: CPT | Performed by: NURSE PRACTITIONER

## 2019-12-10 PROCEDURE — 87449 NOS EACH ORGANISM AG IA: CPT | Performed by: PHYSICIAN ASSISTANT

## 2019-12-10 RX ORDER — DEXTROSE AND SODIUM CHLORIDE 2.5; 45 G/100ML; MG/100ML
250 INJECTION, SOLUTION INTRAVENOUS CONTINUOUS
Status: DISCONTINUED | OUTPATIENT
Start: 2019-12-10 | End: 2019-12-10

## 2019-12-10 RX ORDER — BISACODYL 10 MG
10 SUPPOSITORY, RECTAL RECTAL DAILY PRN
Status: DISCONTINUED | OUTPATIENT
Start: 2019-12-10 | End: 2019-12-16 | Stop reason: HOSPADM

## 2019-12-10 RX ORDER — CLONIDINE 0.3 MG/24H
0.3 PATCH, EXTENDED RELEASE TRANSDERMAL WEEKLY
Status: DISCONTINUED | OUTPATIENT
Start: 2019-12-10 | End: 2019-12-12

## 2019-12-10 RX ORDER — METRONIDAZOLE 500 MG/1
500 TABLET ORAL EVERY 8 HOURS SCHEDULED
Status: DISCONTINUED | OUTPATIENT
Start: 2019-12-10 | End: 2019-12-12

## 2019-12-10 RX ORDER — DEXTROSE AND SODIUM CHLORIDE 5; .45 G/100ML; G/100ML
250 INJECTION, SOLUTION INTRAVENOUS CONTINUOUS
Status: DISPENSED | OUTPATIENT
Start: 2019-12-10 | End: 2019-12-10

## 2019-12-10 RX ORDER — INSULIN GLARGINE 100 [IU]/ML
15 INJECTION, SOLUTION SUBCUTANEOUS
Status: DISCONTINUED | OUTPATIENT
Start: 2019-12-10 | End: 2019-12-11

## 2019-12-10 RX ORDER — BISACODYL 10 MG
10 SUPPOSITORY, RECTAL RECTAL ONCE
Status: COMPLETED | OUTPATIENT
Start: 2019-12-10 | End: 2019-12-10

## 2019-12-10 RX ORDER — LIDOCAINE HYDROCHLORIDE 20 MG/ML
JELLY TOPICAL
Status: DISPENSED
Start: 2019-12-10 | End: 2019-12-10

## 2019-12-10 RX ORDER — INSULIN GLARGINE 100 [IU]/ML
15 INJECTION, SOLUTION SUBCUTANEOUS
Status: DISCONTINUED | OUTPATIENT
Start: 2019-12-11 | End: 2019-12-10

## 2019-12-10 RX ORDER — GENTAMICIN SULFATE 1 MG/G
OINTMENT TOPICAL DAILY
Status: DISCONTINUED | OUTPATIENT
Start: 2019-12-10 | End: 2019-12-16 | Stop reason: HOSPADM

## 2019-12-10 RX ORDER — DEXTROSE MONOHYDRATE 25 G/50ML
INJECTION, SOLUTION INTRAVENOUS
Status: COMPLETED
Start: 2019-12-10 | End: 2019-12-10

## 2019-12-10 RX ADMIN — DEXTROSE AND SODIUM CHLORIDE 250 ML/HR: 5; .9 INJECTION, SOLUTION INTRAVENOUS at 14:08

## 2019-12-10 RX ADMIN — INSULIN GLARGINE 10 UNITS: 100 INJECTION, SOLUTION SUBCUTANEOUS at 08:14

## 2019-12-10 RX ADMIN — DEXTROSE AND SODIUM CHLORIDE 250 ML/HR: 5; .45 INJECTION, SOLUTION INTRAVENOUS at 21:09

## 2019-12-10 RX ADMIN — METRONIDAZOLE 500 MG: 500 TABLET ORAL at 22:45

## 2019-12-10 RX ADMIN — GABAPENTIN 100 MG: 100 CAPSULE ORAL at 22:43

## 2019-12-10 RX ADMIN — SODIUM CHLORIDE 0.3 UNITS/HR: 9 INJECTION, SOLUTION INTRAVENOUS at 01:09

## 2019-12-10 RX ADMIN — NIFEDIPINE 50 MG: 10 CAPSULE ORAL at 22:59

## 2019-12-10 RX ADMIN — MELATONIN 6 MG: at 22:45

## 2019-12-10 RX ADMIN — INSULIN LISPRO 1 UNITS: 100 INJECTION, SOLUTION INTRAVENOUS; SUBCUTANEOUS at 22:59

## 2019-12-10 RX ADMIN — PANTOPRAZOLE SODIUM 40 MG: 40 INJECTION, POWDER, FOR SOLUTION INTRAVENOUS at 20:01

## 2019-12-10 RX ADMIN — PANTOPRAZOLE SODIUM 40 MG: 40 INJECTION, POWDER, FOR SOLUTION INTRAVENOUS at 00:55

## 2019-12-10 RX ADMIN — SODIUM CHLORIDE, SODIUM GLUCONATE, SODIUM ACETATE, POTASSIUM CHLORIDE, MAGNESIUM CHLORIDE, SODIUM PHOSPHATE, DIBASIC, AND POTASSIUM PHOSPHATE 100 ML/HR: .53; .5; .37; .037; .03; .012; .00082 INJECTION, SOLUTION INTRAVENOUS at 00:47

## 2019-12-10 RX ADMIN — LABETALOL HYDROCHLORIDE 300 MG: 100 TABLET, FILM COATED ORAL at 22:44

## 2019-12-10 RX ADMIN — SODIUM CHLORIDE 15 MG/HR: 0.9 INJECTION, SOLUTION INTRAVENOUS at 18:08

## 2019-12-10 RX ADMIN — DEXTROSE AND SODIUM CHLORIDE 250 ML/HR: 5; .45 INJECTION, SOLUTION INTRAVENOUS at 15:59

## 2019-12-10 RX ADMIN — HEPARIN SODIUM 5000 UNITS: 5000 INJECTION INTRAVENOUS; SUBCUTANEOUS at 05:56

## 2019-12-10 RX ADMIN — HYDROMORPHONE HYDROCHLORIDE 0.5 MG: 1 INJECTION, SOLUTION INTRAMUSCULAR; INTRAVENOUS; SUBCUTANEOUS at 12:17

## 2019-12-10 RX ADMIN — CHLORHEXIDINE GLUCONATE 0.12% ORAL RINSE 15 ML: 1.2 LIQUID ORAL at 20:00

## 2019-12-10 RX ADMIN — HEPARIN SODIUM 5000 UNITS: 5000 INJECTION INTRAVENOUS; SUBCUTANEOUS at 22:43

## 2019-12-10 RX ADMIN — SODIUM CHLORIDE 15 MG/HR: 0.9 INJECTION, SOLUTION INTRAVENOUS at 02:20

## 2019-12-10 RX ADMIN — DEXTROSE AND SODIUM CHLORIDE 250 ML/HR: 5; .9 INJECTION, SOLUTION INTRAVENOUS at 01:31

## 2019-12-10 RX ADMIN — HYDRALAZINE HYDROCHLORIDE 100 MG: 25 TABLET ORAL at 20:00

## 2019-12-10 RX ADMIN — CEFEPIME HYDROCHLORIDE 1000 MG: 1 INJECTION, POWDER, FOR SOLUTION INTRAMUSCULAR; INTRAVENOUS at 23:00

## 2019-12-10 RX ADMIN — SODIUM CHLORIDE 15 MG/HR: 0.9 INJECTION, SOLUTION INTRAVENOUS at 03:12

## 2019-12-10 RX ADMIN — PANTOPRAZOLE SODIUM 40 MG: 40 INJECTION, POWDER, FOR SOLUTION INTRAVENOUS at 08:14

## 2019-12-10 RX ADMIN — HYDROMORPHONE HYDROCHLORIDE 0.5 MG: 1 INJECTION, SOLUTION INTRAMUSCULAR; INTRAVENOUS; SUBCUTANEOUS at 19:29

## 2019-12-10 RX ADMIN — LIDOCAINE HYDROCHLORIDE 1 APPLICATION: 20 JELLY TOPICAL at 00:36

## 2019-12-10 RX ADMIN — DEXTROSE AND SODIUM CHLORIDE 250 ML/HR: 5; .9 INJECTION, SOLUTION INTRAVENOUS at 05:56

## 2019-12-10 RX ADMIN — BISACODYL 10 MG: 10 SUPPOSITORY RECTAL at 11:30

## 2019-12-10 RX ADMIN — HYDROMORPHONE HYDROCHLORIDE 0.5 MG: 1 INJECTION, SOLUTION INTRAMUSCULAR; INTRAVENOUS; SUBCUTANEOUS at 07:47

## 2019-12-10 RX ADMIN — HEPARIN SODIUM 5000 UNITS: 5000 INJECTION INTRAVENOUS; SUBCUTANEOUS at 14:11

## 2019-12-10 RX ADMIN — ONDANSETRON 4 MG: 2 INJECTION INTRAMUSCULAR; INTRAVENOUS at 03:26

## 2019-12-10 RX ADMIN — SODIUM CHLORIDE 12.5 MG/HR: 0.9 INJECTION, SOLUTION INTRAVENOUS at 07:47

## 2019-12-10 RX ADMIN — INSULIN GLARGINE 15 UNITS: 100 INJECTION, SOLUTION SUBCUTANEOUS at 22:43

## 2019-12-10 RX ADMIN — CLONIDINE HYDROCHLORIDE 0.1 MG: 0.1 TABLET ORAL at 20:00

## 2019-12-10 RX ADMIN — DEXTROSE MONOHYDRATE: 500 INJECTION PARENTERAL at 01:09

## 2019-12-10 RX ADMIN — CLONIDINE 0.3 MG: 0.3 PATCH TRANSDERMAL at 19:59

## 2019-12-10 RX ADMIN — LABETALOL 20 MG/4 ML (5 MG/ML) INTRAVENOUS SYRINGE 10 MG: at 03:51

## 2019-12-10 RX ADMIN — SODIUM CHLORIDE 15 MG/HR: 0.9 INJECTION, SOLUTION INTRAVENOUS at 21:46

## 2019-12-11 PROBLEM — A41.9 SEPSIS (HCC): Status: ACTIVE | Noted: 2019-12-11

## 2019-12-11 PROBLEM — T68.XXXA HYPOTHERMIA: Status: RESOLVED | Noted: 2019-12-07 | Resolved: 2019-12-11

## 2019-12-11 PROBLEM — G93.41 METABOLIC ENCEPHALOPATHY: Status: ACTIVE | Noted: 2019-12-11

## 2019-12-11 LAB
ANION GAP SERPL CALCULATED.3IONS-SCNC: 11 MMOL/L (ref 4–13)
ANION GAP SERPL CALCULATED.3IONS-SCNC: 13 MMOL/L (ref 4–13)
ATRIAL RATE: 93 BPM
BACTERIA UR CULT: NORMAL
BUN SERPL-MCNC: 47 MG/DL (ref 5–25)
BUN SERPL-MCNC: 47 MG/DL (ref 5–25)
CALCIUM SERPL-MCNC: 9.2 MG/DL (ref 8.3–10.1)
CALCIUM SERPL-MCNC: 9.3 MG/DL (ref 8.3–10.1)
CHLORIDE SERPL-SCNC: 106 MMOL/L (ref 100–108)
CHLORIDE SERPL-SCNC: 108 MMOL/L (ref 100–108)
CO2 SERPL-SCNC: 23 MMOL/L (ref 21–32)
CO2 SERPL-SCNC: 24 MMOL/L (ref 21–32)
CREAT SERPL-MCNC: 12.07 MG/DL (ref 0.6–1.3)
CREAT SERPL-MCNC: 12.39 MG/DL (ref 0.6–1.3)
ERYTHROCYTE [DISTWIDTH] IN BLOOD BY AUTOMATED COUNT: 14.8 % (ref 11.6–15.1)
GFR SERPL CREATININE-BSD FRML MDRD: 5 ML/MIN/1.73SQ M
GFR SERPL CREATININE-BSD FRML MDRD: 5 ML/MIN/1.73SQ M
GLUCOSE SERPL-MCNC: 152 MG/DL (ref 65–140)
GLUCOSE SERPL-MCNC: 159 MG/DL (ref 65–140)
GLUCOSE SERPL-MCNC: 174 MG/DL (ref 65–140)
GLUCOSE SERPL-MCNC: 206 MG/DL (ref 65–140)
GLUCOSE SERPL-MCNC: 223 MG/DL (ref 65–140)
GLUCOSE SERPL-MCNC: 357 MG/DL (ref 65–140)
GLUCOSE SERPL-MCNC: 77 MG/DL (ref 65–140)
GLUCOSE SERPL-MCNC: 96 MG/DL (ref 65–140)
HCT VFR BLD AUTO: 24.8 % (ref 36.5–49.3)
HGB BLD-MCNC: 7.9 G/DL (ref 12–17)
MAGNESIUM SERPL-MCNC: 1.8 MG/DL (ref 1.6–2.6)
MAGNESIUM SERPL-MCNC: 1.9 MG/DL (ref 1.6–2.6)
MCH RBC QN AUTO: 32.9 PG (ref 26.8–34.3)
MCHC RBC AUTO-ENTMCNC: 31.9 G/DL (ref 31.4–37.4)
MCV RBC AUTO: 103 FL (ref 82–98)
P AXIS: 80 DEGREES
PHOSPHATE SERPL-MCNC: 6.3 MG/DL (ref 2.7–4.5)
PHOSPHATE SERPL-MCNC: 6.7 MG/DL (ref 2.7–4.5)
PLATELET # BLD AUTO: 261 THOUSANDS/UL (ref 149–390)
PMV BLD AUTO: 9.9 FL (ref 8.9–12.7)
POTASSIUM SERPL-SCNC: 4.1 MMOL/L (ref 3.5–5.3)
POTASSIUM SERPL-SCNC: 4.5 MMOL/L (ref 3.5–5.3)
PR INTERVAL: 160 MS
PROCALCITONIN SERPL-MCNC: 1.75 NG/ML
QRS AXIS: 84 DEGREES
QRSD INTERVAL: 82 MS
QT INTERVAL: 396 MS
QTC INTERVAL: 492 MS
RBC # BLD AUTO: 2.4 MILLION/UL (ref 3.88–5.62)
SODIUM SERPL-SCNC: 142 MMOL/L (ref 136–145)
SODIUM SERPL-SCNC: 143 MMOL/L (ref 136–145)
T WAVE AXIS: 59 DEGREES
VANCOMYCIN TROUGH SERPL-MCNC: 15.7 UG/ML (ref 10–20)
VENTRICULAR RATE: 93 BPM
WBC # BLD AUTO: 12.37 THOUSAND/UL (ref 4.31–10.16)

## 2019-12-11 PROCEDURE — 83735 ASSAY OF MAGNESIUM: CPT | Performed by: INTERNAL MEDICINE

## 2019-12-11 PROCEDURE — 84100 ASSAY OF PHOSPHORUS: CPT | Performed by: INTERNAL MEDICINE

## 2019-12-11 PROCEDURE — 97163 PT EVAL HIGH COMPLEX 45 MIN: CPT

## 2019-12-11 PROCEDURE — 80048 BASIC METABOLIC PNL TOTAL CA: CPT | Performed by: INTERNAL MEDICINE

## 2019-12-11 PROCEDURE — NC001 PR NO CHARGE: Performed by: INTERNAL MEDICINE

## 2019-12-11 PROCEDURE — 99233 SBSQ HOSP IP/OBS HIGH 50: CPT | Performed by: INTERNAL MEDICINE

## 2019-12-11 PROCEDURE — G8979 MOBILITY GOAL STATUS: HCPCS

## 2019-12-11 PROCEDURE — 84145 PROCALCITONIN (PCT): CPT | Performed by: PHYSICIAN ASSISTANT

## 2019-12-11 PROCEDURE — 82948 REAGENT STRIP/BLOOD GLUCOSE: CPT

## 2019-12-11 PROCEDURE — C9113 INJ PANTOPRAZOLE SODIUM, VIA: HCPCS | Performed by: PHYSICIAN ASSISTANT

## 2019-12-11 PROCEDURE — G8978 MOBILITY CURRENT STATUS: HCPCS

## 2019-12-11 PROCEDURE — 80202 ASSAY OF VANCOMYCIN: CPT | Performed by: PHYSICIAN ASSISTANT

## 2019-12-11 PROCEDURE — 99232 SBSQ HOSP IP/OBS MODERATE 35: CPT | Performed by: INTERNAL MEDICINE

## 2019-12-11 PROCEDURE — 85027 COMPLETE CBC AUTOMATED: CPT | Performed by: PHYSICIAN ASSISTANT

## 2019-12-11 PROCEDURE — 93010 ELECTROCARDIOGRAM REPORT: CPT | Performed by: INTERNAL MEDICINE

## 2019-12-11 PROCEDURE — 97116 GAIT TRAINING THERAPY: CPT

## 2019-12-11 RX ORDER — INSULIN GLARGINE 100 [IU]/ML
15 INJECTION, SOLUTION SUBCUTANEOUS EVERY MORNING
Status: DISCONTINUED | OUTPATIENT
Start: 2019-12-11 | End: 2019-12-14

## 2019-12-11 RX ORDER — PANTOPRAZOLE SODIUM 40 MG/1
40 TABLET, DELAYED RELEASE ORAL
Status: DISCONTINUED | OUTPATIENT
Start: 2019-12-11 | End: 2019-12-16 | Stop reason: HOSPADM

## 2019-12-11 RX ORDER — METOCLOPRAMIDE 5 MG/1
5 TABLET ORAL
Status: DISCONTINUED | OUTPATIENT
Start: 2019-12-11 | End: 2019-12-16 | Stop reason: HOSPADM

## 2019-12-11 RX ORDER — LABETALOL 100 MG/1
300 TABLET, FILM COATED ORAL EVERY 12 HOURS SCHEDULED
Status: DISCONTINUED | OUTPATIENT
Start: 2019-12-11 | End: 2019-12-16 | Stop reason: HOSPADM

## 2019-12-11 RX ORDER — MAGNESIUM SULFATE HEPTAHYDRATE 40 MG/ML
2 INJECTION, SOLUTION INTRAVENOUS ONCE
Status: COMPLETED | OUTPATIENT
Start: 2019-12-11 | End: 2019-12-11

## 2019-12-11 RX ORDER — METOCLOPRAMIDE 10 MG/1
10 TABLET ORAL
Status: DISCONTINUED | OUTPATIENT
Start: 2019-12-11 | End: 2019-12-11

## 2019-12-11 RX ORDER — NIFEDIPINE 10 MG/1
60 CAPSULE ORAL 2 TIMES DAILY
Status: DISCONTINUED | OUTPATIENT
Start: 2019-12-11 | End: 2019-12-16 | Stop reason: HOSPADM

## 2019-12-11 RX ORDER — INSULIN GLARGINE 100 [IU]/ML
20 INJECTION, SOLUTION SUBCUTANEOUS
Status: DISCONTINUED | OUTPATIENT
Start: 2019-12-11 | End: 2019-12-12

## 2019-12-11 RX ADMIN — INSULIN LISPRO 3 UNITS: 100 INJECTION, SOLUTION INTRAVENOUS; SUBCUTANEOUS at 15:44

## 2019-12-11 RX ADMIN — PANTOPRAZOLE SODIUM 40 MG: 40 TABLET, DELAYED RELEASE ORAL at 15:21

## 2019-12-11 RX ADMIN — Medication 1 CAPSULE: at 08:01

## 2019-12-11 RX ADMIN — METRONIDAZOLE 500 MG: 500 TABLET ORAL at 13:47

## 2019-12-11 RX ADMIN — CHLORHEXIDINE GLUCONATE 0.12% ORAL RINSE 15 ML: 1.2 LIQUID ORAL at 08:03

## 2019-12-11 RX ADMIN — INSULIN LISPRO 4 UNITS: 100 INJECTION, SOLUTION INTRAVENOUS; SUBCUTANEOUS at 11:38

## 2019-12-11 RX ADMIN — INSULIN GLARGINE 20 UNITS: 100 INJECTION, SOLUTION SUBCUTANEOUS at 23:18

## 2019-12-11 RX ADMIN — HEPARIN SODIUM 5000 UNITS: 5000 INJECTION INTRAVENOUS; SUBCUTANEOUS at 21:41

## 2019-12-11 RX ADMIN — MELATONIN 6 MG: at 21:41

## 2019-12-11 RX ADMIN — MELATONIN 2000 UNITS: at 08:01

## 2019-12-11 RX ADMIN — CLONIDINE HYDROCHLORIDE 0.1 MG: 0.1 TABLET ORAL at 15:21

## 2019-12-11 RX ADMIN — METOCLOPRAMIDE HYDROCHLORIDE 5 MG: 5 TABLET ORAL at 11:55

## 2019-12-11 RX ADMIN — INSULIN LISPRO 3 UNITS: 100 INJECTION, SOLUTION INTRAVENOUS; SUBCUTANEOUS at 11:38

## 2019-12-11 RX ADMIN — ONDANSETRON 4 MG: 2 INJECTION INTRAMUSCULAR; INTRAVENOUS at 21:16

## 2019-12-11 RX ADMIN — HYDRALAZINE HYDROCHLORIDE 100 MG: 25 TABLET ORAL at 15:21

## 2019-12-11 RX ADMIN — INSULIN LISPRO 1 UNITS: 100 INJECTION, SOLUTION INTRAVENOUS; SUBCUTANEOUS at 02:45

## 2019-12-11 RX ADMIN — HYDRALAZINE HYDROCHLORIDE 100 MG: 25 TABLET ORAL at 21:40

## 2019-12-11 RX ADMIN — NIFEDIPINE 50 MG: 10 CAPSULE ORAL at 05:55

## 2019-12-11 RX ADMIN — CEFEPIME HYDROCHLORIDE 1000 MG: 1 INJECTION, POWDER, FOR SOLUTION INTRAMUSCULAR; INTRAVENOUS at 23:18

## 2019-12-11 RX ADMIN — ESCITALOPRAM OXALATE 10 MG: 10 TABLET ORAL at 08:01

## 2019-12-11 RX ADMIN — DESMOPRESSIN ACETATE 40 MG: 0.2 TABLET ORAL at 18:15

## 2019-12-11 RX ADMIN — LANTHANUM CARBONATE 1000 MG: 500 TABLET, CHEWABLE ORAL at 08:05

## 2019-12-11 RX ADMIN — HEPARIN SODIUM 5000 UNITS: 5000 INJECTION INTRAVENOUS; SUBCUTANEOUS at 13:47

## 2019-12-11 RX ADMIN — GABAPENTIN 100 MG: 100 CAPSULE ORAL at 21:41

## 2019-12-11 RX ADMIN — MAGNESIUM SULFATE HEPTAHYDRATE 2 G: 40 INJECTION, SOLUTION INTRAVENOUS at 07:51

## 2019-12-11 RX ADMIN — NIFEDIPINE 60 MG: 10 CAPSULE ORAL at 18:16

## 2019-12-11 RX ADMIN — LANTHANUM CARBONATE 1000 MG: 500 TABLET, CHEWABLE ORAL at 15:46

## 2019-12-11 RX ADMIN — VANCOMYCIN HYDROCHLORIDE 1250 MG: 1 INJECTION, POWDER, LYOPHILIZED, FOR SOLUTION INTRAVENOUS at 09:30

## 2019-12-11 RX ADMIN — GENTAMICIN SULFATE: 1 OINTMENT TOPICAL at 08:05

## 2019-12-11 RX ADMIN — SODIUM CHLORIDE 5 MG/HR: 0.9 INJECTION, SOLUTION INTRAVENOUS at 01:59

## 2019-12-11 RX ADMIN — INSULIN GLARGINE 15 UNITS: 100 INJECTION, SOLUTION SUBCUTANEOUS at 12:42

## 2019-12-11 RX ADMIN — CINACALCET HYDROCHLORIDE 90 MG: 30 TABLET, FILM COATED ORAL at 11:36

## 2019-12-11 RX ADMIN — LISINOPRIL 40 MG: 20 TABLET ORAL at 08:01

## 2019-12-11 RX ADMIN — METRONIDAZOLE 500 MG: 500 TABLET ORAL at 05:55

## 2019-12-11 RX ADMIN — INSULIN LISPRO 10 UNITS: 100 INJECTION, SOLUTION INTRAVENOUS; SUBCUTANEOUS at 07:53

## 2019-12-11 RX ADMIN — CLONIDINE HYDROCHLORIDE 0.1 MG: 0.1 TABLET ORAL at 08:01

## 2019-12-11 RX ADMIN — METRONIDAZOLE 500 MG: 500 TABLET ORAL at 21:41

## 2019-12-11 RX ADMIN — LANTHANUM CARBONATE 1000 MG: 500 TABLET, CHEWABLE ORAL at 11:36

## 2019-12-11 RX ADMIN — METOCLOPRAMIDE HYDROCHLORIDE 5 MG: 5 TABLET ORAL at 15:21

## 2019-12-11 RX ADMIN — HYDRALAZINE HYDROCHLORIDE 100 MG: 25 TABLET ORAL at 08:01

## 2019-12-11 RX ADMIN — PANTOPRAZOLE SODIUM 40 MG: 40 INJECTION, POWDER, FOR SOLUTION INTRAVENOUS at 08:05

## 2019-12-11 RX ADMIN — LABETALOL HYDROCHLORIDE 300 MG: 200 TABLET, FILM COATED ORAL at 21:40

## 2019-12-11 RX ADMIN — INSULIN LISPRO 3 UNITS: 100 INJECTION, SOLUTION INTRAVENOUS; SUBCUTANEOUS at 07:53

## 2019-12-11 RX ADMIN — LABETALOL HYDROCHLORIDE 300 MG: 200 TABLET, FILM COATED ORAL at 10:25

## 2019-12-11 RX ADMIN — Medication 1 SPRAY: at 04:09

## 2019-12-11 RX ADMIN — HEPARIN SODIUM 5000 UNITS: 5000 INJECTION INTRAVENOUS; SUBCUTANEOUS at 05:54

## 2019-12-11 RX ADMIN — ASPIRIN 81 MG 81 MG: 81 TABLET ORAL at 08:01

## 2019-12-12 LAB
ANION GAP SERPL CALCULATED.3IONS-SCNC: 16 MMOL/L (ref 4–13)
ATRIAL RATE: 64 BPM
ATRIAL RATE: 91 BPM
BACTERIA SPEC BFLD CULT: NO GROWTH
BUN SERPL-MCNC: 53 MG/DL (ref 5–25)
CALCIUM SERPL-MCNC: 9 MG/DL (ref 8.3–10.1)
CHLORIDE SERPL-SCNC: 102 MMOL/L (ref 100–108)
CO2 SERPL-SCNC: 21 MMOL/L (ref 21–32)
CREAT SERPL-MCNC: 12.82 MG/DL (ref 0.6–1.3)
ERYTHROCYTE [DISTWIDTH] IN BLOOD BY AUTOMATED COUNT: 15.3 % (ref 11.6–15.1)
GFR SERPL CREATININE-BSD FRML MDRD: 4 ML/MIN/1.73SQ M
GLUCOSE SERPL-MCNC: 175 MG/DL (ref 65–140)
GLUCOSE SERPL-MCNC: 200 MG/DL (ref 65–140)
GLUCOSE SERPL-MCNC: 209 MG/DL (ref 65–140)
GLUCOSE SERPL-MCNC: 237 MG/DL (ref 65–140)
GLUCOSE SERPL-MCNC: 42 MG/DL (ref 65–140)
GLUCOSE SERPL-MCNC: 68 MG/DL (ref 65–140)
GLUCOSE SERPL-MCNC: 70 MG/DL (ref 65–140)
GLUCOSE SERPL-MCNC: 85 MG/DL (ref 65–140)
GLUCOSE SERPL-MCNC: 87 MG/DL (ref 65–140)
GLUCOSE SERPL-MCNC: 92 MG/DL (ref 65–140)
GRAM STN SPEC: NORMAL
GRAM STN SPEC: NORMAL
HCT VFR BLD AUTO: 24 % (ref 36.5–49.3)
HGB BLD-MCNC: 7.7 G/DL (ref 12–17)
MAGNESIUM SERPL-MCNC: 2.2 MG/DL (ref 1.6–2.6)
MCH RBC QN AUTO: 32.9 PG (ref 26.8–34.3)
MCHC RBC AUTO-ENTMCNC: 32.1 G/DL (ref 31.4–37.4)
MCV RBC AUTO: 103 FL (ref 82–98)
P AXIS: 64 DEGREES
P AXIS: 78 DEGREES
PHOSPHATE SERPL-MCNC: 7 MG/DL (ref 2.7–4.5)
PLATELET # BLD AUTO: 220 THOUSANDS/UL (ref 149–390)
PMV BLD AUTO: 9.7 FL (ref 8.9–12.7)
POTASSIUM SERPL-SCNC: 4.3 MMOL/L (ref 3.5–5.3)
PR INTERVAL: 158 MS
PR INTERVAL: 164 MS
PROCALCITONIN SERPL-MCNC: 1.89 NG/ML
QRS AXIS: 31 DEGREES
QRS AXIS: 83 DEGREES
QRSD INTERVAL: 74 MS
QRSD INTERVAL: 80 MS
QT INTERVAL: 376 MS
QT INTERVAL: 452 MS
QTC INTERVAL: 462 MS
QTC INTERVAL: 466 MS
RBC # BLD AUTO: 2.34 MILLION/UL (ref 3.88–5.62)
SODIUM SERPL-SCNC: 139 MMOL/L (ref 136–145)
T WAVE AXIS: 37 DEGREES
T WAVE AXIS: 59 DEGREES
VANCOMYCIN TROUGH SERPL-MCNC: 27.9 UG/ML (ref 10–20)
VENTRICULAR RATE: 64 BPM
VENTRICULAR RATE: 91 BPM
WBC # BLD AUTO: 9.95 THOUSAND/UL (ref 4.31–10.16)

## 2019-12-12 PROCEDURE — 99233 SBSQ HOSP IP/OBS HIGH 50: CPT | Performed by: INTERNAL MEDICINE

## 2019-12-12 PROCEDURE — 83735 ASSAY OF MAGNESIUM: CPT | Performed by: INTERNAL MEDICINE

## 2019-12-12 PROCEDURE — 99232 SBSQ HOSP IP/OBS MODERATE 35: CPT | Performed by: INTERNAL MEDICINE

## 2019-12-12 PROCEDURE — 93010 ELECTROCARDIOGRAM REPORT: CPT | Performed by: INTERNAL MEDICINE

## 2019-12-12 PROCEDURE — 85027 COMPLETE CBC AUTOMATED: CPT | Performed by: INTERNAL MEDICINE

## 2019-12-12 PROCEDURE — 84100 ASSAY OF PHOSPHORUS: CPT | Performed by: INTERNAL MEDICINE

## 2019-12-12 PROCEDURE — 82948 REAGENT STRIP/BLOOD GLUCOSE: CPT

## 2019-12-12 PROCEDURE — 97116 GAIT TRAINING THERAPY: CPT

## 2019-12-12 PROCEDURE — 84145 PROCALCITONIN (PCT): CPT | Performed by: INTERNAL MEDICINE

## 2019-12-12 PROCEDURE — 80202 ASSAY OF VANCOMYCIN: CPT | Performed by: INTERNAL MEDICINE

## 2019-12-12 PROCEDURE — 80048 BASIC METABOLIC PNL TOTAL CA: CPT | Performed by: INTERNAL MEDICINE

## 2019-12-12 PROCEDURE — 97530 THERAPEUTIC ACTIVITIES: CPT

## 2019-12-12 RX ORDER — INSULIN GLARGINE 100 [IU]/ML
24 INJECTION, SOLUTION SUBCUTANEOUS
Status: DISCONTINUED | OUTPATIENT
Start: 2019-12-12 | End: 2019-12-13

## 2019-12-12 RX ADMIN — PANTOPRAZOLE SODIUM 40 MG: 40 TABLET, DELAYED RELEASE ORAL at 06:53

## 2019-12-12 RX ADMIN — HYDRALAZINE HYDROCHLORIDE 100 MG: 25 TABLET ORAL at 08:33

## 2019-12-12 RX ADMIN — ESCITALOPRAM OXALATE 10 MG: 10 TABLET ORAL at 08:33

## 2019-12-12 RX ADMIN — INSULIN LISPRO 2 UNITS: 100 INJECTION, SOLUTION INTRAVENOUS; SUBCUTANEOUS at 12:13

## 2019-12-12 RX ADMIN — METOCLOPRAMIDE HYDROCHLORIDE 5 MG: 5 TABLET ORAL at 17:01

## 2019-12-12 RX ADMIN — DESMOPRESSIN ACETATE 40 MG: 0.2 TABLET ORAL at 17:01

## 2019-12-12 RX ADMIN — LABETALOL HYDROCHLORIDE 300 MG: 200 TABLET, FILM COATED ORAL at 21:19

## 2019-12-12 RX ADMIN — LABETALOL HYDROCHLORIDE 300 MG: 200 TABLET, FILM COATED ORAL at 08:33

## 2019-12-12 RX ADMIN — Medication 1 CAPSULE: at 08:33

## 2019-12-12 RX ADMIN — HYDRALAZINE HYDROCHLORIDE 100 MG: 25 TABLET ORAL at 17:00

## 2019-12-12 RX ADMIN — METOCLOPRAMIDE HYDROCHLORIDE 5 MG: 5 TABLET ORAL at 06:53

## 2019-12-12 RX ADMIN — CLONIDINE HYDROCHLORIDE 0.1 MG: 0.1 TABLET ORAL at 21:22

## 2019-12-12 RX ADMIN — PANTOPRAZOLE SODIUM 40 MG: 40 TABLET, DELAYED RELEASE ORAL at 17:01

## 2019-12-12 RX ADMIN — LANTHANUM CARBONATE 1000 MG: 500 TABLET, CHEWABLE ORAL at 08:35

## 2019-12-12 RX ADMIN — INSULIN GLARGINE 24 UNITS: 100 INJECTION, SOLUTION SUBCUTANEOUS at 23:00

## 2019-12-12 RX ADMIN — HEPARIN SODIUM 5000 UNITS: 5000 INJECTION INTRAVENOUS; SUBCUTANEOUS at 21:21

## 2019-12-12 RX ADMIN — MELATONIN 2000 UNITS: at 08:33

## 2019-12-12 RX ADMIN — GABAPENTIN 100 MG: 100 CAPSULE ORAL at 21:19

## 2019-12-12 RX ADMIN — HYDRALAZINE HYDROCHLORIDE 100 MG: 25 TABLET ORAL at 21:21

## 2019-12-12 RX ADMIN — MELATONIN 6 MG: at 21:20

## 2019-12-12 RX ADMIN — CLONIDINE HYDROCHLORIDE 0.1 MG: 0.1 TABLET ORAL at 17:00

## 2019-12-12 RX ADMIN — INSULIN LISPRO 1 UNITS: 100 INJECTION, SOLUTION INTRAVENOUS; SUBCUTANEOUS at 02:21

## 2019-12-12 RX ADMIN — INSULIN LISPRO 3 UNITS: 100 INJECTION, SOLUTION INTRAVENOUS; SUBCUTANEOUS at 17:42

## 2019-12-12 RX ADMIN — NIFEDIPINE 60 MG: 10 CAPSULE ORAL at 08:36

## 2019-12-12 RX ADMIN — INSULIN LISPRO 4 UNITS: 100 INJECTION, SOLUTION INTRAVENOUS; SUBCUTANEOUS at 08:34

## 2019-12-12 RX ADMIN — LANTHANUM CARBONATE 1000 MG: 500 TABLET, CHEWABLE ORAL at 12:14

## 2019-12-12 RX ADMIN — HEPARIN SODIUM 5000 UNITS: 5000 INJECTION INTRAVENOUS; SUBCUTANEOUS at 13:20

## 2019-12-12 RX ADMIN — GENTAMICIN SULFATE 1 APPLICATION: 1 OINTMENT TOPICAL at 08:35

## 2019-12-12 RX ADMIN — INSULIN LISPRO 3 UNITS: 100 INJECTION, SOLUTION INTRAVENOUS; SUBCUTANEOUS at 08:34

## 2019-12-12 RX ADMIN — ONDANSETRON 4 MG: 2 INJECTION INTRAMUSCULAR; INTRAVENOUS at 21:17

## 2019-12-12 RX ADMIN — EPOETIN ALFA 15000 UNITS: 20000 SOLUTION INTRAVENOUS; SUBCUTANEOUS at 11:24

## 2019-12-12 RX ADMIN — ASPIRIN 81 MG 81 MG: 81 TABLET ORAL at 08:33

## 2019-12-12 RX ADMIN — NIFEDIPINE 60 MG: 10 CAPSULE ORAL at 17:02

## 2019-12-12 RX ADMIN — CLONIDINE HYDROCHLORIDE 0.1 MG: 0.1 TABLET ORAL at 08:37

## 2019-12-12 RX ADMIN — METRONIDAZOLE 500 MG: 500 TABLET ORAL at 06:53

## 2019-12-12 RX ADMIN — FUROSEMIDE 120 MG: 10 INJECTION, SOLUTION INTRAMUSCULAR; INTRAVENOUS at 11:29

## 2019-12-12 RX ADMIN — LISINOPRIL 40 MG: 20 TABLET ORAL at 08:33

## 2019-12-12 RX ADMIN — INSULIN LISPRO 3 UNITS: 100 INJECTION, SOLUTION INTRAVENOUS; SUBCUTANEOUS at 12:13

## 2019-12-12 RX ADMIN — METOCLOPRAMIDE HYDROCHLORIDE 5 MG: 5 TABLET ORAL at 12:13

## 2019-12-12 RX ADMIN — LANTHANUM CARBONATE 1000 MG: 500 TABLET, CHEWABLE ORAL at 17:01

## 2019-12-12 RX ADMIN — HEPARIN SODIUM 5000 UNITS: 5000 INJECTION INTRAVENOUS; SUBCUTANEOUS at 06:53

## 2019-12-12 RX ADMIN — INSULIN GLARGINE 15 UNITS: 100 INJECTION, SOLUTION SUBCUTANEOUS at 08:32

## 2019-12-12 RX ADMIN — CINACALCET HYDROCHLORIDE 90 MG: 30 TABLET, FILM COATED ORAL at 12:13

## 2019-12-13 PROBLEM — R11.2 INTRACTABLE NAUSEA AND VOMITING: Status: RESOLVED | Noted: 2019-03-25 | Resolved: 2019-12-13

## 2019-12-13 PROBLEM — G93.41 METABOLIC ENCEPHALOPATHY: Status: RESOLVED | Noted: 2019-12-11 | Resolved: 2019-12-13

## 2019-12-13 LAB
ANION GAP SERPL CALCULATED.3IONS-SCNC: 16 MMOL/L (ref 4–13)
BACTERIA BLD CULT: NORMAL
BACTERIA BLD CULT: NORMAL
BUN SERPL-MCNC: 55 MG/DL (ref 5–25)
CALCIUM SERPL-MCNC: 9.3 MG/DL (ref 8.3–10.1)
CHLORIDE SERPL-SCNC: 100 MMOL/L (ref 100–108)
CO2 SERPL-SCNC: 23 MMOL/L (ref 21–32)
CREAT SERPL-MCNC: 13.33 MG/DL (ref 0.6–1.3)
GFR SERPL CREATININE-BSD FRML MDRD: 4 ML/MIN/1.73SQ M
GLUCOSE SERPL-MCNC: 114 MG/DL (ref 65–140)
GLUCOSE SERPL-MCNC: 125 MG/DL (ref 65–140)
GLUCOSE SERPL-MCNC: 128 MG/DL (ref 65–140)
GLUCOSE SERPL-MCNC: 45 MG/DL (ref 65–140)
GLUCOSE SERPL-MCNC: 50 MG/DL (ref 65–140)
GLUCOSE SERPL-MCNC: 72 MG/DL (ref 65–140)
GLUCOSE SERPL-MCNC: 78 MG/DL (ref 65–140)
GLUCOSE SERPL-MCNC: 78 MG/DL (ref 65–140)
GLUCOSE SERPL-MCNC: 80 MG/DL (ref 65–140)
GLUCOSE SERPL-MCNC: 83 MG/DL (ref 65–140)
GLUCOSE SERPL-MCNC: 93 MG/DL (ref 65–140)
MAGNESIUM SERPL-MCNC: 2.5 MG/DL (ref 1.6–2.6)
PHOSPHATE SERPL-MCNC: 7.8 MG/DL (ref 2.7–4.5)
POTASSIUM SERPL-SCNC: 4.5 MMOL/L (ref 3.5–5.3)
SODIUM SERPL-SCNC: 139 MMOL/L (ref 136–145)

## 2019-12-13 PROCEDURE — 99232 SBSQ HOSP IP/OBS MODERATE 35: CPT | Performed by: INTERNAL MEDICINE

## 2019-12-13 PROCEDURE — 97530 THERAPEUTIC ACTIVITIES: CPT

## 2019-12-13 PROCEDURE — 80048 BASIC METABOLIC PNL TOTAL CA: CPT | Performed by: INTERNAL MEDICINE

## 2019-12-13 PROCEDURE — 84100 ASSAY OF PHOSPHORUS: CPT | Performed by: INTERNAL MEDICINE

## 2019-12-13 PROCEDURE — 82948 REAGENT STRIP/BLOOD GLUCOSE: CPT

## 2019-12-13 PROCEDURE — 97110 THERAPEUTIC EXERCISES: CPT

## 2019-12-13 PROCEDURE — 83735 ASSAY OF MAGNESIUM: CPT | Performed by: INTERNAL MEDICINE

## 2019-12-13 RX ORDER — INSULIN GLARGINE 100 [IU]/ML
20 INJECTION, SOLUTION SUBCUTANEOUS
Status: DISCONTINUED | OUTPATIENT
Start: 2019-12-13 | End: 2019-12-16 | Stop reason: HOSPADM

## 2019-12-13 RX ADMIN — MELATONIN 6 MG: at 21:58

## 2019-12-13 RX ADMIN — HEPARIN SODIUM 5000 UNITS: 5000 INJECTION INTRAVENOUS; SUBCUTANEOUS at 06:21

## 2019-12-13 RX ADMIN — LANTHANUM CARBONATE 1000 MG: 500 TABLET, CHEWABLE ORAL at 13:10

## 2019-12-13 RX ADMIN — CINACALCET HYDROCHLORIDE 90 MG: 30 TABLET, FILM COATED ORAL at 11:58

## 2019-12-13 RX ADMIN — GABAPENTIN 100 MG: 100 CAPSULE ORAL at 21:58

## 2019-12-13 RX ADMIN — GENTAMICIN SULFATE 1 APPLICATION: 1 OINTMENT TOPICAL at 08:48

## 2019-12-13 RX ADMIN — CLONIDINE HYDROCHLORIDE 0.1 MG: 0.1 TABLET ORAL at 08:40

## 2019-12-13 RX ADMIN — INSULIN LISPRO 3 UNITS: 100 INJECTION, SOLUTION INTRAVENOUS; SUBCUTANEOUS at 08:41

## 2019-12-13 RX ADMIN — HEPARIN SODIUM 5000 UNITS: 5000 INJECTION INTRAVENOUS; SUBCUTANEOUS at 21:59

## 2019-12-13 RX ADMIN — LABETALOL HYDROCHLORIDE 300 MG: 200 TABLET, FILM COATED ORAL at 08:40

## 2019-12-13 RX ADMIN — HYDRALAZINE HYDROCHLORIDE 100 MG: 25 TABLET ORAL at 21:58

## 2019-12-13 RX ADMIN — DESMOPRESSIN ACETATE 40 MG: 0.2 TABLET ORAL at 17:27

## 2019-12-13 RX ADMIN — NIFEDIPINE 60 MG: 10 CAPSULE ORAL at 10:56

## 2019-12-13 RX ADMIN — NIFEDIPINE 60 MG: 10 CAPSULE ORAL at 18:13

## 2019-12-13 RX ADMIN — HEPARIN SODIUM 5000 UNITS: 5000 INJECTION INTRAVENOUS; SUBCUTANEOUS at 13:34

## 2019-12-13 RX ADMIN — METOCLOPRAMIDE HYDROCHLORIDE 5 MG: 5 TABLET ORAL at 06:21

## 2019-12-13 RX ADMIN — METOCLOPRAMIDE HYDROCHLORIDE 5 MG: 5 TABLET ORAL at 17:27

## 2019-12-13 RX ADMIN — LANTHANUM CARBONATE 1000 MG: 500 TABLET, CHEWABLE ORAL at 17:28

## 2019-12-13 RX ADMIN — LISINOPRIL 40 MG: 20 TABLET ORAL at 08:40

## 2019-12-13 RX ADMIN — INSULIN GLARGINE 14 UNITS: 100 INJECTION, SOLUTION SUBCUTANEOUS at 08:39

## 2019-12-13 RX ADMIN — METOCLOPRAMIDE HYDROCHLORIDE 5 MG: 5 TABLET ORAL at 11:58

## 2019-12-13 RX ADMIN — Medication 1 CAPSULE: at 08:40

## 2019-12-13 RX ADMIN — ESCITALOPRAM OXALATE 10 MG: 10 TABLET ORAL at 08:40

## 2019-12-13 RX ADMIN — ASPIRIN 81 MG 81 MG: 81 TABLET ORAL at 08:40

## 2019-12-13 RX ADMIN — PANTOPRAZOLE SODIUM 40 MG: 40 TABLET, DELAYED RELEASE ORAL at 17:27

## 2019-12-13 RX ADMIN — ONDANSETRON 4 MG: 2 INJECTION INTRAMUSCULAR; INTRAVENOUS at 21:34

## 2019-12-13 RX ADMIN — CLONIDINE HYDROCHLORIDE 0.1 MG: 0.1 TABLET ORAL at 21:58

## 2019-12-13 RX ADMIN — HYDRALAZINE HYDROCHLORIDE 100 MG: 25 TABLET ORAL at 17:28

## 2019-12-13 RX ADMIN — HYDRALAZINE HYDROCHLORIDE 100 MG: 25 TABLET ORAL at 08:40

## 2019-12-13 RX ADMIN — INSULIN GLARGINE 20 UNITS: 100 INJECTION, SOLUTION SUBCUTANEOUS at 21:59

## 2019-12-13 RX ADMIN — INSULIN LISPRO 3 UNITS: 100 INJECTION, SOLUTION INTRAVENOUS; SUBCUTANEOUS at 13:10

## 2019-12-13 RX ADMIN — Medication 16 G: at 16:48

## 2019-12-13 RX ADMIN — CLONIDINE HYDROCHLORIDE 0.1 MG: 0.1 TABLET ORAL at 17:27

## 2019-12-13 RX ADMIN — LABETALOL HYDROCHLORIDE 300 MG: 200 TABLET, FILM COATED ORAL at 21:57

## 2019-12-13 RX ADMIN — MELATONIN 2000 UNITS: at 08:39

## 2019-12-13 RX ADMIN — LANTHANUM CARBONATE 1000 MG: 500 TABLET, CHEWABLE ORAL at 08:43

## 2019-12-13 RX ADMIN — PANTOPRAZOLE SODIUM 40 MG: 40 TABLET, DELAYED RELEASE ORAL at 06:21

## 2019-12-14 LAB
ANION GAP SERPL CALCULATED.3IONS-SCNC: 16 MMOL/L (ref 4–13)
BACTERIA BLD CULT: NORMAL
BACTERIA BLD CULT: NORMAL
BUN SERPL-MCNC: 50 MG/DL (ref 5–25)
CALCIUM SERPL-MCNC: 9.4 MG/DL (ref 8.3–10.1)
CHLORIDE SERPL-SCNC: 101 MMOL/L (ref 100–108)
CO2 SERPL-SCNC: 24 MMOL/L (ref 21–32)
CREAT SERPL-MCNC: 13.91 MG/DL (ref 0.6–1.3)
ERYTHROCYTE [DISTWIDTH] IN BLOOD BY AUTOMATED COUNT: 15.7 % (ref 11.6–15.1)
GFR SERPL CREATININE-BSD FRML MDRD: 4 ML/MIN/1.73SQ M
GLUCOSE SERPL-MCNC: 108 MG/DL (ref 65–140)
GLUCOSE SERPL-MCNC: 115 MG/DL (ref 65–140)
GLUCOSE SERPL-MCNC: 120 MG/DL (ref 65–140)
GLUCOSE SERPL-MCNC: 121 MG/DL (ref 65–140)
GLUCOSE SERPL-MCNC: 161 MG/DL (ref 65–140)
GLUCOSE SERPL-MCNC: 73 MG/DL (ref 65–140)
GLUCOSE SERPL-MCNC: 85 MG/DL (ref 65–140)
HCT VFR BLD AUTO: 26.1 % (ref 36.5–49.3)
HGB BLD-MCNC: 8.4 G/DL (ref 12–17)
MAGNESIUM SERPL-MCNC: 2.5 MG/DL (ref 1.6–2.6)
MCH RBC QN AUTO: 32.9 PG (ref 26.8–34.3)
MCHC RBC AUTO-ENTMCNC: 32.2 G/DL (ref 31.4–37.4)
MCV RBC AUTO: 102 FL (ref 82–98)
PHOSPHATE SERPL-MCNC: 8.9 MG/DL (ref 2.7–4.5)
PLATELET # BLD AUTO: 238 THOUSANDS/UL (ref 149–390)
PMV BLD AUTO: 10.2 FL (ref 8.9–12.7)
POTASSIUM SERPL-SCNC: 3.7 MMOL/L (ref 3.5–5.3)
RBC # BLD AUTO: 2.55 MILLION/UL (ref 3.88–5.62)
SODIUM SERPL-SCNC: 141 MMOL/L (ref 136–145)
WBC # BLD AUTO: 7.44 THOUSAND/UL (ref 4.31–10.16)

## 2019-12-14 PROCEDURE — 80048 BASIC METABOLIC PNL TOTAL CA: CPT | Performed by: INTERNAL MEDICINE

## 2019-12-14 PROCEDURE — 99233 SBSQ HOSP IP/OBS HIGH 50: CPT | Performed by: INTERNAL MEDICINE

## 2019-12-14 PROCEDURE — 85027 COMPLETE CBC AUTOMATED: CPT | Performed by: INTERNAL MEDICINE

## 2019-12-14 PROCEDURE — 82948 REAGENT STRIP/BLOOD GLUCOSE: CPT

## 2019-12-14 PROCEDURE — 99232 SBSQ HOSP IP/OBS MODERATE 35: CPT | Performed by: INTERNAL MEDICINE

## 2019-12-14 PROCEDURE — 84100 ASSAY OF PHOSPHORUS: CPT | Performed by: INTERNAL MEDICINE

## 2019-12-14 PROCEDURE — 83735 ASSAY OF MAGNESIUM: CPT | Performed by: INTERNAL MEDICINE

## 2019-12-14 RX ORDER — INSULIN GLARGINE 100 [IU]/ML
10 INJECTION, SOLUTION SUBCUTANEOUS EVERY MORNING
Status: DISCONTINUED | OUTPATIENT
Start: 2019-12-15 | End: 2019-12-16 | Stop reason: HOSPADM

## 2019-12-14 RX ORDER — TORSEMIDE 100 MG/1
100 TABLET ORAL DAILY
Status: DISCONTINUED | OUTPATIENT
Start: 2019-12-14 | End: 2019-12-16 | Stop reason: HOSPADM

## 2019-12-14 RX ORDER — LABETALOL 20 MG/4 ML (5 MG/ML) INTRAVENOUS SYRINGE
10 EVERY 6 HOURS
Status: DISCONTINUED | OUTPATIENT
Start: 2019-12-14 | End: 2019-12-14

## 2019-12-14 RX ADMIN — TORSEMIDE 100 MG: 100 TABLET ORAL at 14:03

## 2019-12-14 RX ADMIN — CLONIDINE HYDROCHLORIDE 0.1 MG: 0.1 TABLET ORAL at 16:55

## 2019-12-14 RX ADMIN — ACETAMINOPHEN 650 MG: 325 TABLET, FILM COATED ORAL at 19:45

## 2019-12-14 RX ADMIN — GABAPENTIN 100 MG: 100 CAPSULE ORAL at 21:39

## 2019-12-14 RX ADMIN — HEPARIN SODIUM 5000 UNITS: 5000 INJECTION INTRAVENOUS; SUBCUTANEOUS at 05:44

## 2019-12-14 RX ADMIN — HEPARIN SODIUM 5000 UNITS: 5000 INJECTION INTRAVENOUS; SUBCUTANEOUS at 21:38

## 2019-12-14 RX ADMIN — LABETALOL HYDROCHLORIDE 300 MG: 200 TABLET, FILM COATED ORAL at 21:39

## 2019-12-14 RX ADMIN — DESMOPRESSIN ACETATE 40 MG: 0.2 TABLET ORAL at 17:04

## 2019-12-14 RX ADMIN — LABETALOL HYDROCHLORIDE 300 MG: 200 TABLET, FILM COATED ORAL at 08:17

## 2019-12-14 RX ADMIN — LANTHANUM CARBONATE 1000 MG: 500 TABLET, CHEWABLE ORAL at 12:13

## 2019-12-14 RX ADMIN — METOCLOPRAMIDE HYDROCHLORIDE 5 MG: 5 TABLET ORAL at 05:51

## 2019-12-14 RX ADMIN — ASPIRIN 81 MG 81 MG: 81 TABLET ORAL at 08:18

## 2019-12-14 RX ADMIN — CINACALCET HYDROCHLORIDE 90 MG: 30 TABLET, FILM COATED ORAL at 12:13

## 2019-12-14 RX ADMIN — HYDRALAZINE HYDROCHLORIDE 20 MG: 20 INJECTION INTRAMUSCULAR; INTRAVENOUS at 05:44

## 2019-12-14 RX ADMIN — INSULIN GLARGINE 15 UNITS: 100 INJECTION, SOLUTION SUBCUTANEOUS at 08:24

## 2019-12-14 RX ADMIN — CLONIDINE HYDROCHLORIDE 0.1 MG: 0.1 TABLET ORAL at 21:39

## 2019-12-14 RX ADMIN — ACETAMINOPHEN 650 MG: 325 TABLET, FILM COATED ORAL at 09:41

## 2019-12-14 RX ADMIN — Medication 1 CAPSULE: at 08:18

## 2019-12-14 RX ADMIN — PANTOPRAZOLE SODIUM 40 MG: 40 TABLET, DELAYED RELEASE ORAL at 05:52

## 2019-12-14 RX ADMIN — INSULIN GLARGINE 19 UNITS: 100 INJECTION, SOLUTION SUBCUTANEOUS at 21:41

## 2019-12-14 RX ADMIN — MELATONIN 6 MG: at 21:40

## 2019-12-14 RX ADMIN — LISINOPRIL 40 MG: 20 TABLET ORAL at 08:17

## 2019-12-14 RX ADMIN — CLONIDINE HYDROCHLORIDE 0.1 MG: 0.1 TABLET ORAL at 08:18

## 2019-12-14 RX ADMIN — INSULIN LISPRO 3 UNITS: 100 INJECTION, SOLUTION INTRAVENOUS; SUBCUTANEOUS at 12:16

## 2019-12-14 RX ADMIN — GENTAMICIN SULFATE: 1 OINTMENT TOPICAL at 12:18

## 2019-12-14 RX ADMIN — HYDRALAZINE HYDROCHLORIDE 100 MG: 25 TABLET ORAL at 08:17

## 2019-12-14 RX ADMIN — ESCITALOPRAM OXALATE 10 MG: 10 TABLET ORAL at 08:17

## 2019-12-14 RX ADMIN — PANTOPRAZOLE SODIUM 40 MG: 40 TABLET, DELAYED RELEASE ORAL at 16:56

## 2019-12-14 RX ADMIN — LANTHANUM CARBONATE 1000 MG: 500 TABLET, CHEWABLE ORAL at 16:59

## 2019-12-14 RX ADMIN — METOCLOPRAMIDE HYDROCHLORIDE 5 MG: 5 TABLET ORAL at 16:55

## 2019-12-14 RX ADMIN — ONDANSETRON 4 MG: 2 INJECTION INTRAMUSCULAR; INTRAVENOUS at 21:36

## 2019-12-14 RX ADMIN — NIFEDIPINE 60 MG: 10 CAPSULE ORAL at 08:19

## 2019-12-14 RX ADMIN — HEPARIN SODIUM 5000 UNITS: 5000 INJECTION INTRAVENOUS; SUBCUTANEOUS at 14:00

## 2019-12-14 RX ADMIN — LANTHANUM CARBONATE 1000 MG: 500 TABLET, CHEWABLE ORAL at 08:19

## 2019-12-14 RX ADMIN — HYDRALAZINE HYDROCHLORIDE 20 MG: 20 INJECTION INTRAMUSCULAR; INTRAVENOUS at 15:44

## 2019-12-14 RX ADMIN — HYDRALAZINE HYDROCHLORIDE 100 MG: 25 TABLET ORAL at 21:40

## 2019-12-14 RX ADMIN — METOCLOPRAMIDE HYDROCHLORIDE 5 MG: 5 TABLET ORAL at 12:13

## 2019-12-14 RX ADMIN — MELATONIN 2000 UNITS: at 08:18

## 2019-12-14 RX ADMIN — HYDRALAZINE HYDROCHLORIDE 100 MG: 25 TABLET ORAL at 16:55

## 2019-12-14 RX ADMIN — NIFEDIPINE 60 MG: 10 CAPSULE ORAL at 17:03

## 2019-12-15 LAB
ANION GAP SERPL CALCULATED.3IONS-SCNC: 15 MMOL/L (ref 4–13)
BACTERIA BLD CULT: NORMAL
BACTERIA BLD CULT: NORMAL
BUN SERPL-MCNC: 47 MG/DL (ref 5–25)
CALCIUM SERPL-MCNC: 9.4 MG/DL (ref 8.3–10.1)
CHLORIDE SERPL-SCNC: 101 MMOL/L (ref 100–108)
CO2 SERPL-SCNC: 24 MMOL/L (ref 21–32)
CREAT SERPL-MCNC: 14.54 MG/DL (ref 0.6–1.3)
ERYTHROCYTE [DISTWIDTH] IN BLOOD BY AUTOMATED COUNT: 15.7 % (ref 11.6–15.1)
GFR SERPL CREATININE-BSD FRML MDRD: 4 ML/MIN/1.73SQ M
GLUCOSE SERPL-MCNC: 110 MG/DL (ref 65–140)
GLUCOSE SERPL-MCNC: 158 MG/DL (ref 65–140)
GLUCOSE SERPL-MCNC: 165 MG/DL (ref 65–140)
GLUCOSE SERPL-MCNC: 189 MG/DL (ref 65–140)
GLUCOSE SERPL-MCNC: 199 MG/DL (ref 65–140)
GLUCOSE SERPL-MCNC: 289 MG/DL (ref 65–140)
HCT VFR BLD AUTO: 26.1 % (ref 36.5–49.3)
HGB BLD-MCNC: 8.5 G/DL (ref 12–17)
MAGNESIUM SERPL-MCNC: 2.7 MG/DL (ref 1.6–2.6)
MCH RBC QN AUTO: 32.4 PG (ref 26.8–34.3)
MCHC RBC AUTO-ENTMCNC: 32.6 G/DL (ref 31.4–37.4)
MCV RBC AUTO: 100 FL (ref 82–98)
PHOSPHATE SERPL-MCNC: 8.3 MG/DL (ref 2.7–4.5)
PLATELET # BLD AUTO: 244 THOUSANDS/UL (ref 149–390)
PMV BLD AUTO: 10.1 FL (ref 8.9–12.7)
POTASSIUM SERPL-SCNC: 3.7 MMOL/L (ref 3.5–5.3)
RBC # BLD AUTO: 2.62 MILLION/UL (ref 3.88–5.62)
SODIUM SERPL-SCNC: 140 MMOL/L (ref 136–145)
WBC # BLD AUTO: 6.68 THOUSAND/UL (ref 4.31–10.16)

## 2019-12-15 PROCEDURE — 97530 THERAPEUTIC ACTIVITIES: CPT

## 2019-12-15 PROCEDURE — 84100 ASSAY OF PHOSPHORUS: CPT | Performed by: INTERNAL MEDICINE

## 2019-12-15 PROCEDURE — 85027 COMPLETE CBC AUTOMATED: CPT | Performed by: INTERNAL MEDICINE

## 2019-12-15 PROCEDURE — 99232 SBSQ HOSP IP/OBS MODERATE 35: CPT | Performed by: INTERNAL MEDICINE

## 2019-12-15 PROCEDURE — 97116 GAIT TRAINING THERAPY: CPT

## 2019-12-15 PROCEDURE — 83735 ASSAY OF MAGNESIUM: CPT | Performed by: INTERNAL MEDICINE

## 2019-12-15 PROCEDURE — 82948 REAGENT STRIP/BLOOD GLUCOSE: CPT

## 2019-12-15 PROCEDURE — 80048 BASIC METABOLIC PNL TOTAL CA: CPT | Performed by: INTERNAL MEDICINE

## 2019-12-15 PROCEDURE — 97110 THERAPEUTIC EXERCISES: CPT

## 2019-12-15 PROCEDURE — 99233 SBSQ HOSP IP/OBS HIGH 50: CPT | Performed by: INTERNAL MEDICINE

## 2019-12-15 RX ORDER — HYDRALAZINE HYDROCHLORIDE 20 MG/ML
20 INJECTION INTRAMUSCULAR; INTRAVENOUS EVERY 4 HOURS PRN
Status: DISCONTINUED | OUTPATIENT
Start: 2019-12-15 | End: 2019-12-16 | Stop reason: HOSPADM

## 2019-12-15 RX ORDER — LABETALOL 20 MG/4 ML (5 MG/ML) INTRAVENOUS SYRINGE
10 EVERY 4 HOURS PRN
Status: DISCONTINUED | OUTPATIENT
Start: 2019-12-15 | End: 2019-12-16 | Stop reason: HOSPADM

## 2019-12-15 RX ADMIN — LANTHANUM CARBONATE 1000 MG: 500 TABLET, CHEWABLE ORAL at 12:06

## 2019-12-15 RX ADMIN — NIFEDIPINE 60 MG: 10 CAPSULE ORAL at 08:44

## 2019-12-15 RX ADMIN — CLONIDINE HYDROCHLORIDE 0.1 MG: 0.1 TABLET ORAL at 08:40

## 2019-12-15 RX ADMIN — CLONIDINE HYDROCHLORIDE 0.1 MG: 0.1 TABLET ORAL at 15:57

## 2019-12-15 RX ADMIN — NIFEDIPINE 60 MG: 10 CAPSULE ORAL at 17:16

## 2019-12-15 RX ADMIN — METOCLOPRAMIDE HYDROCHLORIDE 5 MG: 5 TABLET ORAL at 15:56

## 2019-12-15 RX ADMIN — ACETAMINOPHEN 650 MG: 325 TABLET, FILM COATED ORAL at 09:41

## 2019-12-15 RX ADMIN — MELATONIN 2000 UNITS: at 08:40

## 2019-12-15 RX ADMIN — INSULIN GLARGINE 10 UNITS: 100 INJECTION, SOLUTION SUBCUTANEOUS at 08:46

## 2019-12-15 RX ADMIN — INSULIN LISPRO 3 UNITS: 100 INJECTION, SOLUTION INTRAVENOUS; SUBCUTANEOUS at 12:08

## 2019-12-15 RX ADMIN — CINACALCET HYDROCHLORIDE 90 MG: 30 TABLET, FILM COATED ORAL at 12:08

## 2019-12-15 RX ADMIN — HYDRALAZINE HYDROCHLORIDE 100 MG: 25 TABLET ORAL at 15:57

## 2019-12-15 RX ADMIN — METOCLOPRAMIDE HYDROCHLORIDE 5 MG: 5 TABLET ORAL at 12:08

## 2019-12-15 RX ADMIN — INSULIN LISPRO 2 UNITS: 100 INJECTION, SOLUTION INTRAVENOUS; SUBCUTANEOUS at 12:09

## 2019-12-15 RX ADMIN — ESCITALOPRAM OXALATE 10 MG: 10 TABLET ORAL at 08:40

## 2019-12-15 RX ADMIN — INSULIN GLARGINE 20 UNITS: 100 INJECTION, SOLUTION SUBCUTANEOUS at 22:02

## 2019-12-15 RX ADMIN — HEPARIN SODIUM 5000 UNITS: 5000 INJECTION INTRAVENOUS; SUBCUTANEOUS at 05:20

## 2019-12-15 RX ADMIN — HYDRALAZINE HYDROCHLORIDE 100 MG: 25 TABLET ORAL at 08:41

## 2019-12-15 RX ADMIN — MELATONIN 6 MG: at 22:03

## 2019-12-15 RX ADMIN — HYDRALAZINE HYDROCHLORIDE 20 MG: 20 INJECTION INTRAMUSCULAR; INTRAVENOUS at 07:51

## 2019-12-15 RX ADMIN — LISINOPRIL 40 MG: 20 TABLET ORAL at 08:39

## 2019-12-15 RX ADMIN — GABAPENTIN 100 MG: 100 CAPSULE ORAL at 22:03

## 2019-12-15 RX ADMIN — HYDRALAZINE HYDROCHLORIDE 100 MG: 25 TABLET ORAL at 22:04

## 2019-12-15 RX ADMIN — LANTHANUM CARBONATE 1000 MG: 500 TABLET, CHEWABLE ORAL at 08:43

## 2019-12-15 RX ADMIN — HEPARIN SODIUM 5000 UNITS: 5000 INJECTION INTRAVENOUS; SUBCUTANEOUS at 14:47

## 2019-12-15 RX ADMIN — Medication 1 CAPSULE: at 08:41

## 2019-12-15 RX ADMIN — INSULIN LISPRO 3 UNITS: 100 INJECTION, SOLUTION INTRAVENOUS; SUBCUTANEOUS at 17:20

## 2019-12-15 RX ADMIN — ASPIRIN 81 MG 81 MG: 81 TABLET ORAL at 08:40

## 2019-12-15 RX ADMIN — LABETALOL HYDROCHLORIDE 300 MG: 200 TABLET, FILM COATED ORAL at 22:03

## 2019-12-15 RX ADMIN — PANTOPRAZOLE SODIUM 40 MG: 40 TABLET, DELAYED RELEASE ORAL at 15:56

## 2019-12-15 RX ADMIN — LABETALOL HYDROCHLORIDE 300 MG: 200 TABLET, FILM COATED ORAL at 08:39

## 2019-12-15 RX ADMIN — LANTHANUM CARBONATE 1000 MG: 500 TABLET, CHEWABLE ORAL at 17:17

## 2019-12-15 RX ADMIN — INSULIN LISPRO 3 UNITS: 100 INJECTION, SOLUTION INTRAVENOUS; SUBCUTANEOUS at 22:05

## 2019-12-15 RX ADMIN — INSULIN LISPRO 3 UNITS: 100 INJECTION, SOLUTION INTRAVENOUS; SUBCUTANEOUS at 08:47

## 2019-12-15 RX ADMIN — INSULIN LISPRO 2 UNITS: 100 INJECTION, SOLUTION INTRAVENOUS; SUBCUTANEOUS at 08:46

## 2019-12-15 RX ADMIN — CLONIDINE HYDROCHLORIDE 0.1 MG: 0.1 TABLET ORAL at 22:04

## 2019-12-15 RX ADMIN — TORSEMIDE 100 MG: 100 TABLET ORAL at 08:41

## 2019-12-15 RX ADMIN — DESMOPRESSIN ACETATE 40 MG: 0.2 TABLET ORAL at 17:15

## 2019-12-15 RX ADMIN — HEPARIN SODIUM 5000 UNITS: 5000 INJECTION INTRAVENOUS; SUBCUTANEOUS at 22:02

## 2019-12-16 ENCOUNTER — TELEPHONE (OUTPATIENT)
Dept: OTHER | Facility: OTHER | Age: 34
End: 2019-12-16

## 2019-12-16 VITALS
BODY MASS INDEX: 31.92 KG/M2 | TEMPERATURE: 98.5 F | OXYGEN SATURATION: 95 % | DIASTOLIC BLOOD PRESSURE: 70 MMHG | WEIGHT: 228 LBS | HEART RATE: 88 BPM | SYSTOLIC BLOOD PRESSURE: 160 MMHG | HEIGHT: 71 IN | RESPIRATION RATE: 18 BRPM

## 2019-12-16 LAB
ANION GAP SERPL CALCULATED.3IONS-SCNC: 15 MMOL/L (ref 4–13)
BUN SERPL-MCNC: 44 MG/DL (ref 5–25)
CALCIUM SERPL-MCNC: 9.5 MG/DL (ref 8.3–10.1)
CHLORIDE SERPL-SCNC: 100 MMOL/L (ref 100–108)
CO2 SERPL-SCNC: 26 MMOL/L (ref 21–32)
CREAT SERPL-MCNC: 15.18 MG/DL (ref 0.6–1.3)
ERYTHROCYTE [DISTWIDTH] IN BLOOD BY AUTOMATED COUNT: 15.6 % (ref 11.6–15.1)
GFR SERPL CREATININE-BSD FRML MDRD: 4 ML/MIN/1.73SQ M
GLUCOSE SERPL-MCNC: 118 MG/DL (ref 65–140)
GLUCOSE SERPL-MCNC: 205 MG/DL (ref 65–140)
GLUCOSE SERPL-MCNC: 219 MG/DL (ref 65–140)
GLUCOSE SERPL-MCNC: 331 MG/DL (ref 65–140)
GLUCOSE SERPL-MCNC: 74 MG/DL (ref 65–140)
HCT VFR BLD AUTO: 25.8 % (ref 36.5–49.3)
HGB BLD-MCNC: 8.3 G/DL (ref 12–17)
MAGNESIUM SERPL-MCNC: 2.7 MG/DL (ref 1.6–2.6)
MCH RBC QN AUTO: 33.1 PG (ref 26.8–34.3)
MCHC RBC AUTO-ENTMCNC: 32.2 G/DL (ref 31.4–37.4)
MCV RBC AUTO: 103 FL (ref 82–98)
PHOSPHATE SERPL-MCNC: 8.6 MG/DL (ref 2.7–4.5)
PLATELET # BLD AUTO: 295 THOUSANDS/UL (ref 149–390)
PMV BLD AUTO: 10.1 FL (ref 8.9–12.7)
POTASSIUM SERPL-SCNC: 4.1 MMOL/L (ref 3.5–5.3)
RBC # BLD AUTO: 2.51 MILLION/UL (ref 3.88–5.62)
SODIUM SERPL-SCNC: 141 MMOL/L (ref 136–145)
WBC # BLD AUTO: 7.09 THOUSAND/UL (ref 4.31–10.16)

## 2019-12-16 PROCEDURE — 99232 SBSQ HOSP IP/OBS MODERATE 35: CPT | Performed by: INTERNAL MEDICINE

## 2019-12-16 PROCEDURE — 99223 1ST HOSP IP/OBS HIGH 75: CPT | Performed by: INTERNAL MEDICINE

## 2019-12-16 PROCEDURE — 82948 REAGENT STRIP/BLOOD GLUCOSE: CPT

## 2019-12-16 PROCEDURE — 84100 ASSAY OF PHOSPHORUS: CPT | Performed by: INTERNAL MEDICINE

## 2019-12-16 PROCEDURE — 99239 HOSP IP/OBS DSCHRG MGMT >30: CPT | Performed by: INTERNAL MEDICINE

## 2019-12-16 PROCEDURE — 85027 COMPLETE CBC AUTOMATED: CPT | Performed by: INTERNAL MEDICINE

## 2019-12-16 PROCEDURE — 97116 GAIT TRAINING THERAPY: CPT

## 2019-12-16 PROCEDURE — 80048 BASIC METABOLIC PNL TOTAL CA: CPT | Performed by: INTERNAL MEDICINE

## 2019-12-16 PROCEDURE — 83735 ASSAY OF MAGNESIUM: CPT | Performed by: INTERNAL MEDICINE

## 2019-12-16 PROCEDURE — 97530 THERAPEUTIC ACTIVITIES: CPT

## 2019-12-16 RX ADMIN — MELATONIN 2000 UNITS: at 08:28

## 2019-12-16 RX ADMIN — INSULIN LISPRO 4 UNITS: 100 INJECTION, SOLUTION INTRAVENOUS; SUBCUTANEOUS at 08:27

## 2019-12-16 RX ADMIN — NIFEDIPINE 60 MG: 10 CAPSULE ORAL at 08:30

## 2019-12-16 RX ADMIN — CINACALCET HYDROCHLORIDE 90 MG: 30 TABLET, FILM COATED ORAL at 12:20

## 2019-12-16 RX ADMIN — INSULIN LISPRO 4 UNITS: 100 INJECTION, SOLUTION INTRAVENOUS; SUBCUTANEOUS at 02:18

## 2019-12-16 RX ADMIN — HYDRALAZINE HYDROCHLORIDE 100 MG: 25 TABLET ORAL at 08:28

## 2019-12-16 RX ADMIN — Medication 1 CAPSULE: at 08:28

## 2019-12-16 RX ADMIN — LANTHANUM CARBONATE 1000 MG: 500 TABLET, CHEWABLE ORAL at 12:20

## 2019-12-16 RX ADMIN — LISINOPRIL 40 MG: 20 TABLET ORAL at 08:28

## 2019-12-16 RX ADMIN — ASPIRIN 81 MG 81 MG: 81 TABLET ORAL at 08:29

## 2019-12-16 RX ADMIN — ONDANSETRON 4 MG: 2 INJECTION INTRAMUSCULAR; INTRAVENOUS at 09:49

## 2019-12-16 RX ADMIN — LABETALOL HYDROCHLORIDE 300 MG: 200 TABLET, FILM COATED ORAL at 08:29

## 2019-12-16 RX ADMIN — GENTAMICIN SULFATE: 1 OINTMENT TOPICAL at 09:07

## 2019-12-16 RX ADMIN — HEPARIN SODIUM 5000 UNITS: 5000 INJECTION INTRAVENOUS; SUBCUTANEOUS at 05:26

## 2019-12-16 RX ADMIN — CLONIDINE HYDROCHLORIDE 0.1 MG: 0.1 TABLET ORAL at 08:28

## 2019-12-16 RX ADMIN — PANTOPRAZOLE SODIUM 40 MG: 40 TABLET, DELAYED RELEASE ORAL at 08:28

## 2019-12-16 RX ADMIN — INSULIN GLARGINE 10 UNITS: 100 INJECTION, SOLUTION SUBCUTANEOUS at 08:27

## 2019-12-16 RX ADMIN — METOCLOPRAMIDE HYDROCHLORIDE 5 MG: 5 TABLET ORAL at 12:21

## 2019-12-16 RX ADMIN — METOCLOPRAMIDE HYDROCHLORIDE 5 MG: 5 TABLET ORAL at 08:28

## 2019-12-16 RX ADMIN — ESCITALOPRAM OXALATE 10 MG: 10 TABLET ORAL at 08:29

## 2019-12-16 RX ADMIN — TORSEMIDE 100 MG: 100 TABLET ORAL at 08:28

## 2019-12-16 RX ADMIN — INSULIN LISPRO 3 UNITS: 100 INJECTION, SOLUTION INTRAVENOUS; SUBCUTANEOUS at 12:20

## 2019-12-16 RX ADMIN — INSULIN LISPRO 3 UNITS: 100 INJECTION, SOLUTION INTRAVENOUS; SUBCUTANEOUS at 08:27

## 2019-12-16 NOTE — TELEPHONE ENCOUNTER
Sent the following message to Igor Grullon3 @ [phone] via [employer];    [phone] / James Garcia from [address] [place] [address] / [facility] [phone] / Karla sandoval

## 2019-12-16 NOTE — TELEPHONE ENCOUNTER
Christine Reno from [place]/[phone]/ PT: Davon Orozco / : 1985/Admission Medication questions/[place] Text [email or web address] caller to call back in 20-30 minutes if the Dr Amanda King contact her back

## 2019-12-17 ENCOUNTER — NURSING HOME VISIT (OUTPATIENT)
Dept: GERIATRICS | Facility: OTHER | Age: 34
End: 2019-12-17
Payer: COMMERCIAL

## 2019-12-17 DIAGNOSIS — Z99.2 PERITONEAL DIALYSIS CATHETER IN PLACE (HCC): ICD-10-CM

## 2019-12-17 DIAGNOSIS — D64.9 ANEMIA, UNSPECIFIED TYPE: ICD-10-CM

## 2019-12-17 DIAGNOSIS — G62.9 PERIPHERAL POLYNEUROPATHY: ICD-10-CM

## 2019-12-17 DIAGNOSIS — I15.0 RENOVASCULAR HYPERTENSION: ICD-10-CM

## 2019-12-17 DIAGNOSIS — E10.10 DIABETIC KETOACIDOSIS WITHOUT COMA ASSOCIATED WITH TYPE 1 DIABETES MELLITUS (HCC): ICD-10-CM

## 2019-12-17 DIAGNOSIS — Z99.2 END-STAGE RENAL DISEASE ON PERITONEAL DIALYSIS (HCC): Primary | ICD-10-CM

## 2019-12-17 DIAGNOSIS — R26.2 AMBULATORY DYSFUNCTION: ICD-10-CM

## 2019-12-17 DIAGNOSIS — N18.6 END-STAGE RENAL DISEASE ON PERITONEAL DIALYSIS (HCC): Primary | ICD-10-CM

## 2019-12-17 PROCEDURE — 99306 1ST NF CARE HIGH MDM 50: CPT | Performed by: FAMILY MEDICINE

## 2019-12-17 NOTE — PROGRESS NOTES
[address]  [address]  History and Physical    NAME: Garland Mckeon  AGE: 37 y o  SEX: male [id number]    DATE OF ENCOUNTER: 12/19/2019    Code status:  CPR    Assessment and Plan     1  Diabetic ketoacidosis without coma associated with type 1 diabetes mellitus ([facility] [address] )     - stable  - cont Glargine, accuchecks, avoid hypoglycemia    2  End-stage renal disease on peritoneal dialysis ([facility] )     - tolerating well     - monitor electrolytes    3  Ambulatory dysfunction     - PT/OT ordered     - fall precautions    4  Renovascular hypertension     -elevated Bps, asymptomatic, on multiple BP meds     - increase clonidine dose     - continue BP monitoring q shift    5  Peritoneal dialysis catheter in place [facility])     - can self manage, tolerating well    6  Peripheral polyneuropathy     - cont gabapentin    7  Anemia, unspecified type     - sec to CKD     - monitor CBC      All medications and routine orders were reviewed and updated as needed  Plan discussed with: patient    Chief Complaint     Seen for admission at [address] [place] [address], a 36 y/o male admitted to the hospital nausea, vomiting, hypoglycemia (blood sugar of 64) and AMS  He was diagnosed with DKA and sepsis, treated with antibiotics  His blood presure was uncontrolled, treated with IV and PO hypertensive meds  Nephrology consulted  He is also on PD for over a year, tolerating well  PT evaluated the patient and recommended rehab  He was discharged to [facility] for subacute rehab  He was seen and examined at bedside, stable  No new c/o at this time  He is tolerating PD well  He lives at home with his parents  He is independent of ADLs, does not use any assistive devices      HISTORY:  Past Medical History:   Diagnosis Date    Acute kidney injury ([facility] )     Anxiety     Cerebellar stroke side undetermined 2015 2015,1/2018    Diabetes type 1, controlled ([facility] [address] )     IDDM    Diarrhea     History of shingles 2010    History of transfusion 2018    Hypertension     Muscle weakness     general unsteadiness    Retinopathy      Family History   Problem Relation Age of Onset    Breast cancer Mother     Hypertension Mother     Hyperlipidemia Father     Hypertension Father     Leukemia Maternal Grandmother     Hyperlipidemia Maternal Grandfather     Hypertension Maternal Grandfather     Hyperlipidemia Paternal Grandmother     Hypertension Paternal Grandmother     Heart disease Paternal Grandfather         cardiac disorder    Diabetes Paternal Grandfather      Social History     Socioeconomic History    Marital status: Single     Spouse name: None    Number of children: None    Years of education: None    Highest education level: None   Occupational History    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   Social Needs    Financial resource strain: None    Food insecurity:     Worry: None     Inability: None    Transportation needs:     Medical: None     Non-medical: None   Tobacco Use    Smoking status: Former Smoker     Packs/day: 0 50     Years: 12 00     Pack years: 6 00     Types: Cigarettes     Last attempt to quit: 2018     Years since quittin 8    Smokeless tobacco: Never Used    Tobacco comment: quit 2018   Substance and Sexual Activity    Alcohol use: Not Currently     Comment: rarely    Drug use: Yes     Frequency: 5 0 times per week     Types: Marijuana     Comment: medical    Sexual activity: None   Lifestyle    Physical activity:     Days per week: None     Minutes per session: None    Stress: None   Relationships    Social connections:     Talks on phone: None     Gets together: None     Attends Taoism service: None     Active member of club or organization: None     Attends meetings of clubs or organizations: None     Relationship status: None    Intimate partner violence: Fear of current or ex partner: None     Emotionally abused: None     Physically abused: None     Forced sexual activity: None   Other Topics Concern    None   Social History Narrative    Caffeine use    single       Allergies: Allergies   Allergen Reactions    Sulfa Antibiotics Rash       Review of Systems     Review of Systems   Constitutional: Positive for fatigue  HENT: Negative  Eyes: Negative  Respiratory: Negative  Cardiovascular: Negative  Gastrointestinal: Negative  Endocrine: Negative  Genitourinary: Negative  Musculoskeletal: Positive for back pain and gait problem  Neurological: Positive for weakness  Psychiatric/Behavioral: Negative  All other systems reviewed and are negative  As in HPI  Medications and orders     All medications reviewed and updated in Nursing Home EMR  Objective     Vitals: T: 98 8, P: 85, R: 18, BP: 175/82, 98% RA, Wt: 226 lbs    Physical Exam   Constitutional: He is oriented to person, place, and time  He appears well-developed and well-nourished  No distress  HENT:   Head: Normocephalic and atraumatic  Mouth/Throat: Oropharynx is clear and moist  No oropharyngeal exudate  Eyes: Pupils are equal, round, and reactive to light  Conjunctivae and EOM are normal    Neck: Normal range of motion  Neck supple  Cardiovascular: Normal rate, regular rhythm and normal heart sounds  Exam reveals no friction rub  No murmur heard  Pulmonary/Chest: Effort normal and breath sounds normal  No respiratory distress  He has no wheezes  He exhibits no tenderness  Abdominal: Soft  Bowel sounds are normal  He exhibits no distension  There is no tenderness  Musculoskeletal: Normal range of motion  He exhibits no edema, tenderness or deformity  Neurological: He is alert and oriented to person, place, and time  A sensory deficit is present  No cranial nerve deficit  He exhibits normal muscle tone  Coordination normal    Skin: Skin is warm and dry  He is not diaphoretic  Psychiatric: He has a normal mood and affect  His behavior is normal  Judgment and thought content normal    Nursing note and vitals reviewed  Pertinent Laboratory/Diagnostic Studies: The following labs/studies were reviewed please see chart or hospital paperwork for details  Ref Range & Units 12/16/19 0522    Sodium 136 - 145 mmol/L 141    Potassium 3 5 - 5 3 mmol/L 4 1    Chloride 100 - 108 mmol/L 100    CO2 21 - 32 mmol/L 26    ANION GAP 4 - 13 mmol/L 15High     BUN 5 - 25 mg/dL 44High     Creatinine 0 60 - 1 30 mg/dL 15  18High     Comment: Standardized to IDMS reference method   Glucose 65 - 140 mg/dL 205High        Calcium 8 3 - 10 1 mg/dL 9 5    eGFR ml/min/1 73sq m 4      Ref Range & Units 12/16/19 0522    WBC 4 31 - 10 16 Thousand/uL 7 09    RBC 3 88 - 5 62 Million/uL 2 51Low     Hemoglobin 12 0 - 17 0 g/dL 8 3Low     Hematocrit 36 5 - 49 3 % 25 8Low     MCV 82 - 98 fL 103High     MCH 26 8 - 34 3 pg 33 1    MCHC 31 4 - 37 4 g/dL 32 2    RDW 11 6 - 15 1 % 15  6High     Platelets 859 - 697 Thousands/uL 295    MPV 8 9 - 12 7 fL 10 1      - Medication Side Effects: Adverse side effects of medications were reviewed with the patient/guardian today  DOS: 12/19/2019  Facility: [facility] List: [place]  BILLING CODE: [id number] [facility] [address] place of service: POS 31 Skilled Care-Part A Coverage  Diagnoses:   Diagnosis ICD-10-CM Associated Orders   1  End-stage renal disease on peritoneal dialysis ([facility] [address] ) N18 6     Z99 2    2  Diabetic ketoacidosis without coma associated with type 1 diabetes mellitus ([facility]) E10 10    3  Ambulatory dysfunction R26 2    4  Renovascular hypertension I15 0    5  Peritoneal dialysis catheter in place [facility]) Z99 2    6  Peripheral polyneuropathy G62 9    7   Anemia, unspecified type D64 9

## 2019-12-18 ENCOUNTER — NURSING HOME VISIT (OUTPATIENT)
Dept: GERIATRICS | Facility: OTHER | Age: 34
End: 2019-12-18
Payer: COMMERCIAL

## 2019-12-18 DIAGNOSIS — F32.1 CURRENT MODERATE EPISODE OF MAJOR DEPRESSIVE DISORDER WITHOUT PRIOR EPISODE (HCC): ICD-10-CM

## 2019-12-18 DIAGNOSIS — G62.9 PERIPHERAL POLYNEUROPATHY: ICD-10-CM

## 2019-12-18 DIAGNOSIS — Z99.2 END-STAGE RENAL DISEASE ON PERITONEAL DIALYSIS (HCC): ICD-10-CM

## 2019-12-18 DIAGNOSIS — N18.6 END-STAGE RENAL DISEASE ON PERITONEAL DIALYSIS (HCC): ICD-10-CM

## 2019-12-18 DIAGNOSIS — E10.649 TYPE 1 DIABETES MELLITUS WITH HYPOGLYCEMIA AND WITHOUT COMA (HCC): Primary | ICD-10-CM

## 2019-12-18 DIAGNOSIS — R60.0 BILATERAL LEG EDEMA: ICD-10-CM

## 2019-12-18 DIAGNOSIS — I15.0 RENOVASCULAR HYPERTENSION: ICD-10-CM

## 2019-12-18 DIAGNOSIS — L29.9 PRURITUS: ICD-10-CM

## 2019-12-18 PROCEDURE — 99309 SBSQ NF CARE MODERATE MDM 30: CPT | Performed by: PHYSICIAN ASSISTANT

## 2019-12-18 RX ORDER — CLONIDINE HYDROCHLORIDE 0.1 MG/1
0.1 TABLET ORAL AS NEEDED
COMMUNITY
End: 2020-12-17 | Stop reason: HOSPADM

## 2019-12-18 NOTE — PROGRESS NOTES
[facility]  [address]  [phone]  [place]   Code 31          NAME: Savanna Mckeon  AGE: 37 y o  SEX: male    DATE OF ENCOUNTER: 12/18/2019    Assessment and Plan     Type 1 diabetes mellitus with hypoglycemia (HCC)    Lab Results   Component Value Date    HGBA1C 5 8 12/09/2019       Blood sugar log reviewed, stable  Continue insulin lispro 10 units QAC + sliding scale and insulin glargine 12 units QAM and 15 units QHS  Continue monitoring with POC glucose checks  Continue atorvastatin 40 mg PO QD  Peripheral polyneuropathy  Continue gabapentin 100 mg PO QHS  Pruritus  Continue hydroxyzie 10 mg PO Q6H prn for itching  Pt notes itching has improved since being at [facility]  Pt has not had to use hydroxyzine since being at facility  End-stage renal disease on peritoneal dialysis ([facility] [address] )  Pt comfortable with self-administering PD  Continue  Continue cinacalcet 90 mg PO QD and velphoro 500 mg PO QAC  Bilateral leg edema  Continue torsemide 100mg PO QD  Current moderate episode of major depressive disorder without prior episode (HCC)  Continue lexapro 10 mg PO QD  Renovascular hypertension  BP logs reviewed and are elevated  Clonidine increased from 0 1mg PO TID to 0 2 mg PO TID  Continue labatelol 150mg PO QAM and QHS  Continue hydralizine 50 mg PO TID  Continue lisinoptril 40 mg PO QD  Continue nifedipine 60 mg PO QD  Will continue to monitor  All medications and routine orders were reviewed and updated as needed  Chief Complaint     Elevated blood pressures  History of Present Illness     WM is a 36 yo CM with mutliple medical comorbidities including but not limited to type 1 DM, ESRD on HD, depression, HTN, and polyneuropathy, interviewed and examined in collaboration with nursing for SNF follow-up and evaluation of elevated blood pressures  Pt denies changes in vision and headaches at this time   Pt states he feels best with SBP 150s, but discussed with pt that SBP has been 180-220, which is too high  Discussed increase in clonidine with pt and his mother, both are comfortable with this  Pt denies pain at this time  Pt feels that he is getting stronger at facility and is thrilled with his progress thus far  Pt notes he does not have his full appetite yet but is eating 40-50% of each meal, which is improved since his hospitalization  Pt notes that he is able to produce some urine  Pt notes regular BMs  No other concerns besides elevated blood pressure from nursing  The patient's allergies, past medical, surgical, social and family history were reviewed and unchanged  Review of Systems     Review of Systems   Constitutional: Positive for activity change  Negative for chills, fatigue and fever  HENT: Negative for sore throat  Respiratory: Negative for cough and shortness of breath  Cardiovascular: Positive for leg swelling  Negative for chest pain and palpitations  Gastrointestinal: Negative for abdominal distention, constipation, diarrhea, nausea and vomiting  Genitourinary: Negative for dysuria  Musculoskeletal: Negative for arthralgias  Neurological: Negative for dizziness and headaches  Psychiatric/Behavioral: Negative for suicidal ideas  Objective     Vitals: 189/97-98 8F-83 [id number] on RA    Physical Exam   Constitutional: He is oriented to person, place, and time  No distress  Well- appearing male seated comfortably in seat   HENT:   Head: Normocephalic and atraumatic  Nose: Nose normal    Mouth/Throat: Oropharynx is clear and moist  No oropharyngeal exudate  Eyes: Pupils are equal, round, and reactive to light  Conjunctivae and EOM are normal  Right eye exhibits no discharge  Left eye exhibits no discharge  No scleral icterus  Cardiovascular: Normal rate and regular rhythm  Exam reveals no gallop and no friction rub  No murmur heard    Pulmonary/Chest: Effort normal and breath sounds normal  No stridor  No respiratory distress  He has no wheezes  He has no rales  Abdominal: Soft  Bowel sounds are normal  He exhibits distension (slight, on PD)  There is no tenderness  There is no rebound and no guarding  PD in place   Musculoskeletal: Normal range of motion  He exhibits edema (2+ BLE)  He exhibits no tenderness  Neurological: He is alert and oriented to person, place, and time  Skin: He is not diaphoretic  Psychiatric:   Pleasant and cooperative during exam    Nursing note and vitals reviewed  Pertinent Laboratory/Diagnostic Studies:  Reviewed in facility chart     Current Medications   Medications reviewed and updated see facility chart for details        Current Outpatient Medications:     cloNIDine (CATAPRES) 0 2 mg tablet, Take 0 2 mg by mouth 3 (three) times a day, Disp: , Rfl:     ADMELOG SOLOSTAR 100 units/mL injection pen, INJECT 10 UNITS WITH A SLIDING SCALE OF 1 UNIT FOR EVERY 25 OVER 150 SUBCUTANEOUSLY THREE TIMES DAILY WITH MEALS, Disp: 3 mL, Rfl: 1    aspirin 81 mg chewable tablet, Chew 81 mg daily, Disp: , Rfl:     atorvastatin (LIPITOR) 40 mg tablet, Take 1 tablet (40 mg total) by mouth every evening, Disp: 90 tablet, Rfl: 1    b complex vitamins capsule, Take 1 capsule by mouth daily, Disp: , Rfl:     B-D ULTRAFINE III SHORT PEN 31G X 8 MM MISC, USE 1 PEN NEEDLE 8 TIMES DAILY, Disp: 100 each, Rfl: 47    calcium acetate (PHOSLO) 667 mg capsule, Take 1 capsule (667 mg total) by mouth 3 (three) times a day with meals (Patient not taking: Reported on 11/20/2019), Disp: , Rfl: 0    Cholecalciferol (VITAMIN D) 2000 units CAPS, Take 1 capsule by mouth daily, Disp: , Rfl:     cinacalcet (SENSIPAR) 90 MG tablet, Take 90 mg by mouth daily, Disp: , Rfl: 11    ergocalciferol (ERGOCALCIFEROL) 30348 units capsule, Take 2,000 Units by mouth daily , Disp: , Rfl:     escitalopram (LEXAPRO) 10 mg tablet, Take 1 tablet (10 mg total) by mouth daily, Disp: 90 tablet, Rfl: 2   famotidine (PEPCID) 20 mg tablet, Take 1 tablet (20 mg total) by mouth daily at bedtime, Disp: 30 tablet, Rfl: 5    gabapentin (NEURONTIN) 100 mg capsule, Take 100 mg by mouth daily at bedtime, Disp: , Rfl: 1    gentamicin (GARAMYCIN) 0 1 % cream, , Disp: , Rfl:     GLUCAGON EMERGENCY 1 MG injection, INJECT 1MG SUBCUTANEOUSLY AS NEEDED (AS DIRECTED)   MAY REPEAT DOSE EVERY 20 MINUTES AS NEEDED, Disp: , Rfl: 3    hydrALAZINE (APRESOLINE) 100 MG tablet, Take 0 5 tablets (50 mg total) by mouth 3 (three) times a day (Patient taking differently: Take 100 mg by mouth 3 (three) times a day 100 mg twice daily), Disp: , Rfl:     hydrOXYzine HCL (ATARAX) 10 mg tablet, Take 1 tablet (10 mg total) by mouth every 6 (six) hours as needed for itching, Disp: 120 tablet, Rfl: 0    insulin glargine (BASAGLAR KWIKPEN) 100 units/mL injection pen, Inject 12u in AM and 15u in PM, Disp: 5 pen, Rfl: 0    labetalol (NORMODYNE) 300 mg tablet, Take 0 5 tablets (150 mg total) by mouth every 8 (eight) hours (Patient taking differently: Take 300 mg by mouth as needed ), Disp: 60 tablet, Rfl: 3    lanthanum (FOSRENOL) 1000 MG chewable tablet, CHEW 1 TABLET BY MOUTH WITH MEALS AND 1 TAB WITH SNACKS DAILY, Disp: , Rfl: 11    lisinopril (ZESTRIL) 40 mg tablet, Take 40 mg by mouth daily, Disp: , Rfl:     Melatonin 10 MG TABS, Take 3 mg by mouth daily at bedtime , Disp: , Rfl:     mupirocin (BACTROBAN) 2 % ointment, Apply topically 3 (three) times a day (Patient not taking: Reported on 12/7/2019), Disp: 22 g, Rfl: 0    NIFEdipine ER (ADALAT CC) 60 MG 24 hr tablet, Take 1 tablet (60 mg total) by mouth daily (Patient taking differently: Take 60 mg by mouth 2 (two) times a day  ), Disp: 180 tablet, Rfl: 0    ondansetron (ZOFRAN) 4 mg tablet, Take 1 tablet (4 mg total) by mouth every 8 (eight) hours as needed for nausea or vomiting (Patient taking differently: Take 4 mg by mouth every 8 (eight) hours as needed for nausea or vomiting ), Disp: 30 tablet, Rfl: 0    Probiotic Product (PROBIOTIC DAILY) CAPS, Take 1 capsule by mouth daily, Disp: , Rfl:     Sucroferric Oxyhydroxide (VELPHORO PO), Velphoro, Disp: , Rfl:     torsemide (DEMADEX) 100 mg tablet, Take 100 mg by mouth every morning, Disp: , Rfl: 11      Lorena Fish PA-C  12/18/2019 4:36 PM

## 2019-12-18 NOTE — ASSESSMENT & PLAN NOTE
Pt comfortable with self-administering PD  Continue  Continue cinacalcet 90 mg PO QD and velphoro 500 mg PO QAC

## 2019-12-18 NOTE — ASSESSMENT & PLAN NOTE
Continue hydroxyzie 10 mg PO Q6H prn for itching  Pt notes itching has improved since being at [facility]  Pt has not had to use hydroxyzine since being at facility

## 2019-12-18 NOTE — ASSESSMENT & PLAN NOTE
BP logs reviewed and are elevated  Clonidine increased from 0 1mg PO TID to 0 2 mg PO TID  Continue labatelol 150mg PO QAM and QHS  Continue hydralizine 50 mg PO TID  Continue lisinoptril 40 mg PO QD  Continue nifedipine 60 mg PO QD  Will continue to monitor

## 2019-12-18 NOTE — ASSESSMENT & PLAN NOTE
Lab Results   Component Value Date    HGBA1C 5 8 12/09/2019       Blood sugar log reviewed, stable  Continue insulin lispro 10 units QAC + sliding scale and insulin glargine 12 units QAM and 15 units QHS  Continue monitoring with POC glucose checks   Continue atorvastatin 40 mg PO QD

## 2019-12-19 PROBLEM — R26.2 AMBULATORY DYSFUNCTION: Status: ACTIVE | Noted: 2019-12-19

## 2019-12-23 ENCOUNTER — NURSING HOME VISIT (OUTPATIENT)
Dept: GERIATRICS | Facility: OTHER | Age: 34
End: 2019-12-23
Payer: MEDICARE

## 2019-12-23 DIAGNOSIS — E78.5 DYSLIPIDEMIA: ICD-10-CM

## 2019-12-23 DIAGNOSIS — N18.6 END-STAGE RENAL DISEASE ON PERITONEAL DIALYSIS (HCC): ICD-10-CM

## 2019-12-23 DIAGNOSIS — E10.649 TYPE 1 DIABETES MELLITUS WITH HYPOGLYCEMIA AND WITHOUT COMA (HCC): Primary | ICD-10-CM

## 2019-12-23 DIAGNOSIS — G62.9 PERIPHERAL POLYNEUROPATHY: ICD-10-CM

## 2019-12-23 DIAGNOSIS — R60.0 BILATERAL LEG EDEMA: ICD-10-CM

## 2019-12-23 DIAGNOSIS — L29.9 PRURITUS: ICD-10-CM

## 2019-12-23 DIAGNOSIS — Z99.2 PERITONEAL DIALYSIS CATHETER IN PLACE (HCC): ICD-10-CM

## 2019-12-23 DIAGNOSIS — I15.0 RENOVASCULAR HYPERTENSION: ICD-10-CM

## 2019-12-23 DIAGNOSIS — Z99.2 END-STAGE RENAL DISEASE ON PERITONEAL DIALYSIS (HCC): ICD-10-CM

## 2019-12-23 DIAGNOSIS — R26.2 AMBULATORY DYSFUNCTION: ICD-10-CM

## 2019-12-23 DIAGNOSIS — F32.1 CURRENT MODERATE EPISODE OF MAJOR DEPRESSIVE DISORDER WITHOUT PRIOR EPISODE (HCC): ICD-10-CM

## 2019-12-23 PROCEDURE — 99316 NF DSCHRG MGMT 30 MIN+: CPT | Performed by: PHYSICIAN ASSISTANT

## 2019-12-23 NOTE — ASSESSMENT & PLAN NOTE
Lab Results   Component Value Date    HGBA1C 5 8 12/09/2019     Blood sugar log reviewed, stable  Continue insulin glargine 12 units SQ QAM and 15 units SQ QHS  Continue insulin lispro 10 units QAC+ sliding scale  Continue gabapentin 100mg PO QHS for diabetic neuropathy

## 2019-12-23 NOTE — PROGRESS NOTES
[facility]  [address]  [phone]  DISCHARGE SUMMARY- Code 32  Facility: [facility]     NAME: Nohemi Mckeon  AGE: 37 y o  SEX: male  DATE OF ADMISSION: 12/16/19  DATE OF DISCHARGE: 12/24/19  DISCHARGE DISPOSITION: Home     Reason for admission: Patient was admitted from [address] [place] [address]  for rehabilitation after hospitalization for hypoglycemia  Course of stay: Patient was admitted to [facility] for rehabilitation due to generalized weakness/physical deconditioning s/p hypoglycemia  Significant events during the stay include elevated blood pressures, which have resolved with increase dose of clonidine  The patient participated in PT/OT  SUBJECTIVE:     WM is a 38 yo CM with multiple medical comorbidities including but not limited to history of CVA, Type 1 DM, ESRD on PD, and HTN, interviewed and examined in collaboration with nursing for SNF discharge  Pt notes that he is feeling well and much stronger since his admission  Pt feel comfortably self-administering PD  Pt feels comfortable administering insulin and checking blood sugars  Pt states that every so often he gets a little lightheaded but denies dizziness and losing his balance  Pt denies pain at this time  Pt eating well and staying well hydrated  Pt is having regular BMs  Pt denies urinary symptoms  No concerns from nursing at this time  ROS:  Review of Systems   Constitutional: Negative for appetite change, chills and fever  HENT: Negative for sore throat  Respiratory: Negative for cough and shortness of breath  Cardiovascular: Positive for leg swelling  Negative for chest pain and palpitations  Gastrointestinal: Negative for constipation, diarrhea, nausea and vomiting  Musculoskeletal: Negative for arthralgias  Neurological: Positive for light-headedness (Every so often )  Negative for dizziness and headaches  Psychiatric/Behavioral: Negative for suicidal ideas         PHYSICAL EXAM:    Vitals:  132/75-98 8F- 81 bpm- 223 2lb- 18-98% on RA  POC glucose: 89    Physical Exam   Constitutional: No distress  Chronically-ill appearing young male seated comfortably in bed  HENT:   Head: Normocephalic and atraumatic  Nose: Nose normal    Mouth/Throat: Oropharynx is clear and moist  No oropharyngeal exudate  Eyes: Pupils are equal, round, and reactive to light  Conjunctivae are normal  Right eye exhibits no discharge  Left eye exhibits no discharge  No scleral icterus  Cardiovascular: Normal rate and regular rhythm  Exam reveals no gallop and no friction rub  No murmur heard  Pulmonary/Chest: Effort normal and breath sounds normal  No stridor  No respiratory distress  He has no wheezes  He has no rales  Abdominal: Soft  Bowel sounds are normal  He exhibits distension  There is no tenderness  There is no rebound and no guarding  Musculoskeletal: Normal range of motion  He exhibits edema (1+ BLE)  He exhibits no tenderness or deformity  Neurological: He is alert  Skin: He is not diaphoretic  Psychiatric:   Pleasant and cooperative during exam     Nursing note and vitals reviewed  Admission Diagnoses:  Type 1 DM with hypoglycemia, depression, ESRD on PD, HTN     Discharge Diagnoses: Type [facility]    Lab Results   Component Value Date    HGBA1C 5 8 12/09/2019     Blood sugar log reviewed, stable  Continue insulin glargine 12 units SQ QAM and 15 units SQ QHS  Continue insulin lispro 10 units QAC+ sliding scale  Continue gabapentin 100mg PO QHS for diabetic neuropathy  Peripheral polyneuropathy  Continue gabapentin  Pruritus  Continue hydroxyzine 10 mg PO Q6H prn for itching  Pt notes itching less since regularly taking phosphorus binder with meals  End-stage renal disease on peritoneal dialysis [facility])  Continue following with nephrology  Continue PD  Pt notes he is comfortable self-administering PD   Continue cinacalcet 90 mg PO QD, calcium acetate 667 mg PO QD, and velphoro 500mg PO QAC  Bilateral leg edema  Improving with PD  Continue torsemide 100mg PO QD  Peritoneal dialysis catheter in place [facility])  Continue self-administering PD  Continue following with nephrology  Dyslipidemia  Continue atorvastatin 40 mg PO QD  Current moderate episode of major depressive disorder without prior episode (HCC)  Continue lexapro 10mg PO QD  Denies SI/HI at this time  Ambulatory dysfunction  Continue using cane  Continue fall precautions at home  Continue home PT to improve functional status  Renovascular hypertension  BP logs reviewed, stable since increase in clonidine dose  Continue hydralazine 50 mg PO TID, lisinopril 40 mg PO QD, nifedepine 60mg PO QD, labetalol 150mg PO QAM and QHS, and clonidine 0 2mg PO TID  Follow-up Recommendations: Follow-up with PCP  Follow-up with nephrology  Labs and testing performed during stay:  Reviewed in facility chart     New Medications: None   Medication Changes: Increase in clonidine dose  Discontinued Medications: None   Discharge Medications: See discharge medication list which was reviewed and signed        Current Outpatient Medications:     ADMELOG SOLOSTAR 100 units/mL injection pen, INJECT 10 UNITS WITH A SLIDING SCALE OF 1 UNIT FOR EVERY 25 OVER 150 SUBCUTANEOUSLY THREE TIMES DAILY WITH MEALS, Disp: 3 mL, Rfl: 1    aspirin 81 mg chewable tablet, Chew 81 mg daily, Disp: , Rfl:     atorvastatin (LIPITOR) 40 mg tablet, Take 1 tablet (40 mg total) by mouth every evening, Disp: 90 tablet, Rfl: 1    b complex vitamins capsule, Take 1 capsule by mouth daily, Disp: , Rfl:     B-D ULTRAFINE III SHORT PEN 31G X 8 MM MISC, USE 1 PEN NEEDLE 8 TIMES DAILY, Disp: 100 each, Rfl: 47    calcium acetate (PHOSLO) 667 mg capsule, Take 1 capsule (667 mg total) by mouth 3 (three) times a day with meals (Patient not taking: Reported on 11/20/2019), Disp: , Rfl: 0    Cholecalciferol (VITAMIN D) 2000 units CAPS, Take 1 capsule by mouth daily, Disp: , Rfl:     cinacalcet (SENSIPAR) 90 MG tablet, Take 90 mg by mouth daily, Disp: , Rfl: 11    cloNIDine (CATAPRES) 0 2 mg tablet, Take 0 2 mg by mouth 3 (three) times a day, Disp: , Rfl:     ergocalciferol (ERGOCALCIFEROL) 93941 units capsule, Take 2,000 Units by mouth daily , Disp: , Rfl:     escitalopram (LEXAPRO) 10 mg tablet, Take 1 tablet (10 mg total) by mouth daily, Disp: 90 tablet, Rfl: 2    famotidine (PEPCID) 20 mg tablet, Take 1 tablet (20 mg total) by mouth daily at bedtime, Disp: 30 tablet, Rfl: 5    gabapentin (NEURONTIN) 100 mg capsule, Take 100 mg by mouth daily at bedtime, Disp: , Rfl: 1    gentamicin (GARAMYCIN) 0 1 % cream, , Disp: , Rfl:     GLUCAGON EMERGENCY 1 MG injection, INJECT 1MG SUBCUTANEOUSLY AS NEEDED (AS DIRECTED)   MAY REPEAT DOSE EVERY 20 MINUTES AS NEEDED, Disp: , Rfl: 3    hydrALAZINE (APRESOLINE) 100 MG tablet, Take 0 5 tablets (50 mg total) by mouth 3 (three) times a day (Patient taking differently: Take 100 mg by mouth 3 (three) times a day 100 mg twice daily), Disp: , Rfl:     hydrOXYzine HCL (ATARAX) 10 mg tablet, Take 1 tablet (10 mg total) by mouth every 6 (six) hours as needed for itching, Disp: 120 tablet, Rfl: 0    insulin glargine (BASAGLAR KWIKPEN) 100 units/mL injection pen, Inject 12u in AM and 15u in PM, Disp: 5 pen, Rfl: 0    labetalol (NORMODYNE) 300 mg tablet, Take 0 5 tablets (150 mg total) by mouth every 8 (eight) hours (Patient taking differently: Take 300 mg by mouth as needed ), Disp: 60 tablet, Rfl: 3    lanthanum (FOSRENOL) 1000 MG chewable tablet, CHEW 1 TABLET BY MOUTH WITH MEALS AND 1 TAB WITH SNACKS DAILY, Disp: , Rfl: 11    lisinopril (ZESTRIL) 40 mg tablet, Take 40 mg by mouth daily, Disp: , Rfl:     Melatonin 10 MG TABS, Take 3 mg by mouth daily at bedtime , Disp: , Rfl:     mupirocin (BACTROBAN) 2 % ointment, Apply topically 3 (three) times a day (Patient not taking: Reported on 12/7/2019), Disp: 22 g, Rfl: 0    NIFEdipine ER (ADALAT CC) 60 MG 24 hr tablet, Take 1 tablet (60 mg total) by mouth daily (Patient taking differently: Take 60 mg by mouth 2 (two) times a day  ), Disp: 180 tablet, Rfl: 0    ondansetron (ZOFRAN) 4 mg tablet, Take 1 tablet (4 mg total) by mouth every 8 (eight) hours as needed for nausea or vomiting (Patient taking differently: Take 4 mg by mouth every 8 (eight) hours as needed for nausea or vomiting ), Disp: 30 tablet, Rfl: 0    Probiotic Product (PROBIOTIC DAILY) CAPS, Take 1 capsule by mouth daily, Disp: , Rfl:     Sucroferric Oxyhydroxide (VELPHORO PO), Velphoro, Disp: , Rfl:     torsemide (DEMADEX) 100 mg tablet, Take 100 mg by mouth every morning, Disp: , Rfl: 11      Discussion with patient/family and further instructions:  -Fall precautions  -Monitor for signs/symptoms of infection  -Medication list was reviewed and signed      Status at time of discharge: Stable    Billing based on time  Time spent on unit: 40 minutes  Time spent on counseling on debility/condition: 30 minutes      Matilde Domingo PA-C  88/17/62909:72 PM

## 2019-12-23 NOTE — ASSESSMENT & PLAN NOTE
Continue using cane  Continue fall precautions at home  Continue home PT to improve functional status

## 2019-12-23 NOTE — ASSESSMENT & PLAN NOTE
Continue following with nephrology  Continue PD  Pt notes he is comfortable self-administering PD  Continue cinacalcet 90 mg PO QD, calcium acetate 667 mg PO QD, and velphoro 500mg PO QAC

## 2019-12-23 NOTE — ASSESSMENT & PLAN NOTE
Continue hydroxyzine 10 mg PO Q6H prn for itching  Pt notes itching less since regularly taking phosphorus binder with meals

## 2019-12-23 NOTE — ASSESSMENT & PLAN NOTE
BP logs reviewed, stable since increase in clonidine dose  Continue hydralazine 50 mg PO TID, lisinopril 40 mg PO QD, nifedepine 60mg PO QD, labetalol 150mg PO QAM and QHS, and clonidine 0 2mg PO TID

## 2019-12-26 ENCOUNTER — TRANSITIONAL CARE MANAGEMENT (OUTPATIENT)
Dept: INTERNAL MEDICINE CLINIC | Facility: CLINIC | Age: 34
End: 2019-12-26

## 2020-01-02 ENCOUNTER — OFFICE VISIT (OUTPATIENT)
Dept: INTERNAL MEDICINE CLINIC | Facility: CLINIC | Age: 35
End: 2020-01-02
Payer: MEDICARE

## 2020-01-02 VITALS
SYSTOLIC BLOOD PRESSURE: 146 MMHG | HEIGHT: 71 IN | TEMPERATURE: 98 F | DIASTOLIC BLOOD PRESSURE: 78 MMHG | RESPIRATION RATE: 14 BRPM | HEART RATE: 83 BPM | OXYGEN SATURATION: 98 % | BODY MASS INDEX: 32.09 KG/M2 | WEIGHT: 229.2 LBS

## 2020-01-02 DIAGNOSIS — R27.0 ATAXIA: ICD-10-CM

## 2020-01-02 DIAGNOSIS — L29.9 PRURITUS: ICD-10-CM

## 2020-01-02 DIAGNOSIS — F33.9 EPISODE OF RECURRENT MAJOR DEPRESSIVE DISORDER, UNSPECIFIED DEPRESSION EPISODE SEVERITY (HCC): ICD-10-CM

## 2020-01-02 DIAGNOSIS — N18.6 END-STAGE RENAL DISEASE ON PERITONEAL DIALYSIS (HCC): ICD-10-CM

## 2020-01-02 DIAGNOSIS — I15.0 RENOVASCULAR HYPERTENSION: ICD-10-CM

## 2020-01-02 DIAGNOSIS — E10.649 TYPE 1 DIABETES MELLITUS WITH HYPOGLYCEMIA AND WITHOUT COMA (HCC): Primary | ICD-10-CM

## 2020-01-02 DIAGNOSIS — R26.2 AMBULATORY DYSFUNCTION: ICD-10-CM

## 2020-01-02 DIAGNOSIS — Z99.2 END-STAGE RENAL DISEASE ON PERITONEAL DIALYSIS (HCC): ICD-10-CM

## 2020-01-02 PROBLEM — E10.10 DIABETIC KETOACIDOSIS ASSOCIATED WITH TYPE 1 DIABETES MELLITUS (HCC): Status: RESOLVED | Noted: 2019-12-09 | Resolved: 2020-01-02

## 2020-01-02 PROCEDURE — 99495 TRANSJ CARE MGMT MOD F2F 14D: CPT | Performed by: INTERNAL MEDICINE

## 2020-01-02 RX ORDER — HYDROXYZINE HYDROCHLORIDE 10 MG/1
10 TABLET, FILM COATED ORAL EVERY 6 HOURS PRN
Qty: 120 TABLET | Refills: 0 | Status: SHIPPED | OUTPATIENT
Start: 2020-01-02 | End: 2020-05-11 | Stop reason: ALTCHOICE

## 2020-01-02 RX ORDER — ESCITALOPRAM OXALATE 10 MG/1
10 TABLET ORAL DAILY
Qty: 90 TABLET | Refills: 2 | Status: SHIPPED | OUTPATIENT
Start: 2020-01-02 | End: 2021-04-15

## 2020-01-02 RX ORDER — RANITIDINE 150 MG/1
150 CAPSULE ORAL
COMMUNITY
End: 2020-01-02 | Stop reason: ALTCHOICE

## 2020-01-02 NOTE — PROGRESS NOTES
Assessment/Plan:    Problem List Items Addressed This Visit        Endocrine    Type 1 diabetes mellitus with hypoglycemia ([facility] [address] ) - Primary     Pt has received dexcom continuous glucose monitoring device which will aid in possible adjustments of his insulin regimen of glargine 12units in AM and 15units qHS, lispro 10units QAC with sliding scale  Follow up with Endo            Cardiovascular and Mediastinum    Renovascular hypertension     Reinforced low sodium diet  Pt has been self adjusting clonidine dose to 0 1mg TID if SBP <160 and clonidine 0 1mg two tabs TID if SBP >160, hydralazine 50mg TID, lisinoprill 40mg daily, nifedipine 60mg daily, labetalol 150mg in AM and qHS  Continue follow up with Nephrology            Musculoskeletal and Integument    Pruritus     Hydroxyzine renewed  Relevant Medications    hydrOXYzine HCL (ATARAX) 10 mg tablet       Other    End-stage renal disease on peritoneal dialysis ([facility] [address] )    Ataxia    Relevant Orders    Shower chair    Ambulatory dysfunction    Relevant Orders    Shower chair      Other Visit Diagnoses     Episode of recurrent major depressive disorder, unspecified depression episode severity (HCC)        Relevant Medications    escitalopram (LEXAPRO) 10 mg tablet    hydrOXYzine HCL (ATARAX) 10 mg tablet          Subjective:      Patient ID: Concepcion Negro is a 37 y o  male  HPI   TCM Call (since 12/2/2019)     Date and time call was made  12/26/2019 11:14 AM    Hospital care reviewed  Records reviewed    Patient was hospitialized at  Other (comment)    Comment  [facility]     Date of Admission  12/16/19    Date of discharge  12/24/19    Diagnosis  hypoglycemia    Disposition  Home      TCM Call (since 12/2/2019)     Scheduled for follow up?   Yes <img src='C:FILES (X73)    I have advised the patient to call PCP with any new or worsening symptoms  Sumanth Japanese, [address] [place]          30UY male with DM1 with nephropathy, ESRD on PD, MDD, HLD, HTN, vitamin D def with h/o CVA here for OZ  He is accompanied by his mother  He was hospitalized at [facility] 12/7-12/16/19 for hypoglycemia with vomiting  He was found lethargic by his mother, gave 1 glucose ab and when EMS arrived blood glucose was 64  His hospitalization was complicated by LLUVIA, renovascular HTN, DKA and sepsis  He was treated supportively  He was sent to acute rehab at [facility] and discharged 12/24  Rehab stay was complicated by HTN and dose of clonidine was increased  If SBP <160 he takes clonidine 0 1mg tab but if it is higher he takes 0 2mg tab  If his bp goes too low he gets too fatigued  He has remained on Glargine 12units in AM and 15units qHS  Lispro 10units qAC and sliding scale  BS in the evening are high  He checks his sugar at midnight and has given himself 4units  He also gets sugar with dialysis  He denies hypoglycemic events        The following portions of the patient's history were reviewed and updated as appropriate: allergies, current medications, past family history, past medical history, past social history, past surgical history and problem list     Current Outpatient Medications:     ADMELOG SOLOSTAR 100 units/mL injection pen, INJECT 10 UNITS WITH A SLIDING SCALE OF 1 UNIT FOR EVERY 25 OVER 150 SUBCUTANEOUSLY THREE TIMES DAILY WITH MEALS, Disp: 3 mL, Rfl: 1    aspirin 81 mg chewable tablet, Chew 81 mg daily, Disp: , Rfl:     atorvastatin (LIPITOR) 40 mg tablet, Take 1 tablet (40 mg total) by mouth every evening, Disp: 90 tablet, Rfl: 1    b complex vitamins capsule, Take 1 capsule by mouth daily, Disp: , Rfl:     B-D ULTRAFINE III SHORT PEN 31G X 8 MM MISC, USE 1 PEN NEEDLE 8 TIMES DAILY, Disp: 100 each, Rfl: 47    calcium acetate (PHOSLO) 667 mg capsule, Take 1 capsule (667 mg total) by mouth 3 (three) times a day with meals, Disp: , Rfl: 0    Cholecalciferol (VITAMIN D) 2000 units CAPS, Take 1 capsule by mouth daily, Disp: , Rfl:    cinacalcet (SENSIPAR) 90 MG tablet, Take 90 mg by mouth daily, Disp: , Rfl: 11    cloNIDine (CATAPRES) 0 1 mg tablet, Take 0 2 mg by mouth 3 (three) times a day May add another 0 1 as needed, Disp: , Rfl:     ergocalciferol (ERGOCALCIFEROL) 85429 units capsule, Take 2,000 Units by mouth daily , Disp: , Rfl:     Ergocalciferol (VITAMIN D2 PO), Vitamin D2 1,250 mcg (50,000 unit) capsule, Disp: , Rfl:     famotidine (PEPCID) 20 mg tablet, Take 1 tablet (20 mg total) by mouth daily at bedtime, Disp: 30 tablet, Rfl: 5    gabapentin (NEURONTIN) 100 mg capsule, Take 100 mg by mouth daily at bedtime, Disp: , Rfl: 1    gentamicin (GARAMYCIN) 0 1 % cream, , Disp: , Rfl:     GLUCAGON EMERGENCY 1 MG injection, INJECT 1MG SUBCUTANEOUSLY AS NEEDED (AS DIRECTED)   MAY REPEAT DOSE EVERY 20 MINUTES AS NEEDED, Disp: , Rfl: 3    hydrALAZINE (APRESOLINE) 100 MG tablet, Take 0 5 tablets (50 mg total) by mouth 3 (three) times a day (Patient taking differently: Take 100 mg by mouth 3 (three) times a day 100 mg twice daily), Disp: , Rfl:     insulin glargine (BASAGLAR KWIKPEN) 100 units/mL injection pen, Inject 12u in AM and 15u in PM, Disp: 5 pen, Rfl: 0    labetalol (NORMODYNE) 300 mg tablet, Take 0 5 tablets (150 mg total) by mouth every 8 (eight) hours (Patient taking differently: Take 300 mg by mouth as needed ), Disp: 60 tablet, Rfl: 3    lanthanum (FOSRENOL) 1000 MG chewable tablet, CHEW 1 TABLET BY MOUTH WITH MEALS AND 1 TAB WITH SNACKS DAILY, Disp: , Rfl: 11    lisinopril (ZESTRIL) 40 mg tablet, Take 40 mg by mouth daily, Disp: , Rfl:     mupirocin (BACTROBAN) 2 % ointment, Apply topically 3 (three) times a day, Disp: 22 g, Rfl: 0    NIFEdipine ER (ADALAT CC) 60 MG 24 hr tablet, Take 1 tablet (60 mg total) by mouth daily (Patient taking differently: Take 60 mg by mouth 2 (two) times a day  ), Disp: 180 tablet, Rfl: 0    NON FORMULARY, Medical marijuana, has medical card, Disp: , Rfl:     ondansetron (ZOFRAN) 4 mg tablet, Take 1 tablet (4 mg total) by mouth every 8 (eight) hours as needed for nausea or vomiting (Patient taking differently: Take 4 mg by mouth every 8 (eight) hours as needed for nausea or vomiting ), Disp: 30 tablet, Rfl: 0    Probiotic Product (PROBIOTIC DAILY) CAPS, Take 1 capsule by mouth daily, Disp: , Rfl:     Sucroferric Oxyhydroxide (VELPHORO PO), Velphoro, Disp: , Rfl:     torsemide (DEMADEX) 100 mg tablet, Take 100 mg by mouth every morning, Disp: , Rfl: 11    escitalopram (LEXAPRO) 10 mg tablet, Take 1 tablet (10 mg total) by mouth daily, Disp: 90 tablet, Rfl: 2    hydrOXYzine HCL (ATARAX) 10 mg tablet, Take 1 tablet (10 mg total) by mouth every 6 (six) hours as needed for itching, Disp: 120 tablet, Rfl: 0    Review of Systems   Constitutional: Negative for appetite change  +weight fluctuates   Respiratory: Negative  Cardiovascular: Positive for leg swelling  Negative for chest pain and palpitations  Gastrointestinal: Positive for nausea  Negative for vomiting  Genitourinary: Negative  Musculoskeletal: Positive for gait problem  Skin: Positive for color change  pruritis   Neurological: Positive for dizziness  Negative for headaches  Objective:    /78   Pulse 83   Temp 98 °F (36 7 °C)   Resp 14   Ht 5' 11" (1 803 m)   Wt 104 kg (229 lb 3 2 oz)   SpO2 98%   BMI 31 97 kg/m²      Physical Exam   Constitutional: He appears well-developed and well-nourished  HENT:   Mouth/Throat: Oropharynx is clear and moist  No oropharyngeal exudate  Eyes:   Wears glasses   Neck: Neck supple  Cardiovascular: Normal rate, regular rhythm and normal heart sounds  Trace pedal edema b/l   Pulmonary/Chest: Effort normal and breath sounds normal  No respiratory distress  Musculoskeletal:   Ambulates with a cane   Neurological: He is alert     Skin:   Nodular superficial lesions on anterior chest around hair follicles  Thickened excoriation on R nasolacrimal aspect of eye   Psychiatric: He has a normal mood and affect  His behavior is normal    Vitals reviewed  BMI Counseling: Body mass index is 31 97 kg/m²  The BMI is above normal  Nutrition recommendations include decreasing portion sizes, consuming healthier snacks and moderation in carbohydrate intake  Exercise recommendations include moderate physical activity 150 minutes/week and strength training exercises  No pharmacotherapy was ordered

## 2020-01-02 NOTE — ASSESSMENT & PLAN NOTE
Pt has received dexcom continuous glucose monitoring device which will aid in possible adjustments of his insulin regimen of glargine 12units in AM and 15units qHS, lispro 10units QAC with sliding scale    Follow up with Endo

## 2020-01-02 NOTE — ASSESSMENT & PLAN NOTE
Reinforced low sodium diet  Pt has been self adjusting clonidine dose to 0 1mg TID if SBP <160 and clonidine 0 1mg two tabs TID if SBP >160, hydralazine 50mg TID, lisinoprill 40mg daily, nifedipine 60mg daily, labetalol 150mg in AM and qHS    Continue follow up with Nephrology

## 2020-01-06 ENCOUNTER — TELEPHONE (OUTPATIENT)
Dept: INTERNAL MEDICINE CLINIC | Facility: CLINIC | Age: 35
End: 2020-01-06

## 2020-01-06 NOTE — TELEPHONE ENCOUNTER
Pt's mother Ofelia Stuart is calling on behalf of the pt  The shower chair script from last week's appointment needs to be changed to say,"transfer tub bench"  Ofelia Stuart requests that the new script be faxed to [facility] at [phone]  Please call mother back at [phone] once this has been done  Thank you!

## 2020-01-08 ENCOUNTER — TELEPHONE (OUTPATIENT)
Dept: INTERNAL MEDICINE CLINIC | Facility: CLINIC | Age: 35
End: 2020-01-08

## 2020-01-24 RX ORDER — PEN NEEDLE, DIABETIC 32 GX 1/4"
NEEDLE, DISPOSABLE MISCELLANEOUS
Qty: 100 EACH | Refills: 0 | OUTPATIENT
Start: 2020-01-24

## 2020-01-27 ENCOUNTER — REMOTE DEVICE CLINIC VISIT (OUTPATIENT)
Dept: CARDIOLOGY CLINIC | Facility: CLINIC | Age: 35
End: 2020-01-27
Payer: MEDICARE

## 2020-01-27 DIAGNOSIS — Z95.818 PRESENCE OF OTHER CARDIAC IMPLANTS AND GRAFTS: Primary | ICD-10-CM

## 2020-01-27 PROCEDURE — G2066 INTER DEVC REMOTE 30D: HCPCS | Performed by: INTERNAL MEDICINE

## 2020-01-27 PROCEDURE — 93298 REM INTERROG DEV EVAL SCRMS: CPT | Performed by: INTERNAL MEDICINE

## 2020-01-27 NOTE — PROGRESS NOTES
Results for orders placed or performed in visit on 01/27/20   Cardiac EP device report    Narrative    MDT LOOP  CARELINK TRANSMISSION: BATTERY STATUS "OK"  NO PATIENT OR DEVICE ACTIVATED EPISODES  NORMAL DEVICE FUNCTION   NC

## 2020-02-20 ENCOUNTER — APPOINTMENT (EMERGENCY)
Dept: CT IMAGING | Facility: HOSPITAL | Age: 35
DRG: 058 | End: 2020-02-20
Payer: MEDICARE

## 2020-02-20 ENCOUNTER — APPOINTMENT (INPATIENT)
Dept: MRI IMAGING | Facility: HOSPITAL | Age: 35
DRG: 058 | End: 2020-02-20
Payer: MEDICARE

## 2020-02-20 ENCOUNTER — HOSPITAL ENCOUNTER (INPATIENT)
Facility: HOSPITAL | Age: 35
LOS: 1 days | DRG: 058 | End: 2020-02-21
Attending: EMERGENCY MEDICINE | Admitting: INTERNAL MEDICINE
Payer: MEDICARE

## 2020-02-20 DIAGNOSIS — R79.89 AZOTEMIA: ICD-10-CM

## 2020-02-20 DIAGNOSIS — I63.9 CEREBROVASCULAR ACCIDENT (CVA) OF LEFT PONTINE STRUCTURE (HCC): ICD-10-CM

## 2020-02-20 DIAGNOSIS — G40.901 STATUS EPILEPTICUS (HCC): ICD-10-CM

## 2020-02-20 DIAGNOSIS — Z86.73 HISTORY OF LACUNAR CEREBROVASCULAR ACCIDENT (CVA): ICD-10-CM

## 2020-02-20 DIAGNOSIS — I16.0 HYPERTENSIVE URGENCY: ICD-10-CM

## 2020-02-20 DIAGNOSIS — R80.1 PERSISTENT PROTEINURIA: ICD-10-CM

## 2020-02-20 DIAGNOSIS — N18.6 END-STAGE RENAL DISEASE ON PERITONEAL DIALYSIS (HCC): ICD-10-CM

## 2020-02-20 DIAGNOSIS — N17.0 ACUTE RENAL FAILURE WITH ACUTE TUBULAR NECROSIS SUPERIMPOSED ON STAGE 2 CHRONIC KIDNEY DISEASE (HCC): ICD-10-CM

## 2020-02-20 DIAGNOSIS — E83.39 HYPERPHOSPHATEMIA: ICD-10-CM

## 2020-02-20 DIAGNOSIS — H53.8 BLURRED VISION: ICD-10-CM

## 2020-02-20 DIAGNOSIS — Z99.2 END-STAGE RENAL DISEASE ON PERITONEAL DIALYSIS (HCC): ICD-10-CM

## 2020-02-20 DIAGNOSIS — N18.2 ACUTE RENAL FAILURE WITH ACUTE TUBULAR NECROSIS SUPERIMPOSED ON STAGE 2 CHRONIC KIDNEY DISEASE (HCC): ICD-10-CM

## 2020-02-20 DIAGNOSIS — H53.30 BINOCULAR VISUAL DISTURBANCE: ICD-10-CM

## 2020-02-20 DIAGNOSIS — I63.9 CEREBROVASCULAR ACCIDENT (CVA), UNSPECIFIED MECHANISM (HCC): ICD-10-CM

## 2020-02-20 DIAGNOSIS — E10.21 TYPE 1 DIABETES MELLITUS WITH DIABETIC NEPHROPATHY, WITH LONG-TERM CURRENT USE OF INSULIN (HCC): Chronic | ICD-10-CM

## 2020-02-20 DIAGNOSIS — Z86.73 HISTORY OF LACUNAR CEREBROVASCULAR ACCIDENT (CVA): Chronic | ICD-10-CM

## 2020-02-20 DIAGNOSIS — H51.0 BINOCULAR VISION DISORDER WITH CONJUGATE GAZE PALSY: ICD-10-CM

## 2020-02-20 DIAGNOSIS — H46.9 OPTIC NEURITIS DUE TO MULTIPLE SCLEROSIS (HCC): ICD-10-CM

## 2020-02-20 DIAGNOSIS — R11.0 NAUSEA: ICD-10-CM

## 2020-02-20 DIAGNOSIS — E72.11 HYPERHOMOCYSTEINEMIA (HCC): ICD-10-CM

## 2020-02-20 DIAGNOSIS — E55.9 VITAMIN D DEFICIENCY: ICD-10-CM

## 2020-02-20 DIAGNOSIS — R42 VERTIGO: Primary | ICD-10-CM

## 2020-02-20 DIAGNOSIS — N17.0 ACUTE RENAL FAILURE WITH ACUTE TUBULAR NECROSIS SUPERIMPOSED ON STAGE 3 CHRONIC KIDNEY DISEASE (HCC): ICD-10-CM

## 2020-02-20 DIAGNOSIS — N18.30 ACUTE RENAL FAILURE WITH ACUTE TUBULAR NECROSIS SUPERIMPOSED ON STAGE 3 CHRONIC KIDNEY DISEASE (HCC): ICD-10-CM

## 2020-02-20 DIAGNOSIS — G35 OPTIC NEURITIS DUE TO MULTIPLE SCLEROSIS (HCC): ICD-10-CM

## 2020-02-20 PROBLEM — E10.649 TYPE 1 DIABETES MELLITUS WITH HYPOGLYCEMIA (HCC): Chronic | Status: ACTIVE | Noted: 2017-06-19

## 2020-02-20 LAB
ALBUMIN SERPL BCP-MCNC: 3.3 G/DL (ref 3.5–5)
ALP SERPL-CCNC: 77 U/L (ref 46–116)
ALT SERPL W P-5'-P-CCNC: 22 U/L (ref 12–78)
ANION GAP SERPL CALCULATED.3IONS-SCNC: 13 MMOL/L (ref 4–13)
APTT PPP: 33 SECONDS (ref 23–37)
AST SERPL W P-5'-P-CCNC: 20 U/L (ref 5–45)
BASOPHILS # BLD AUTO: 0.07 THOUSANDS/ΜL (ref 0–0.1)
BASOPHILS NFR BLD AUTO: 1 % (ref 0–1)
BILIRUB SERPL-MCNC: 0.43 MG/DL (ref 0.2–1)
BUN SERPL-MCNC: 53 MG/DL (ref 5–25)
CALCIUM SERPL-MCNC: 8.7 MG/DL (ref 8.3–10.1)
CHLORIDE SERPL-SCNC: 101 MMOL/L (ref 100–108)
CO2 SERPL-SCNC: 29 MMOL/L (ref 21–32)
CREAT SERPL-MCNC: 15.32 MG/DL (ref 0.6–1.3)
EOSINOPHIL # BLD AUTO: 0.83 THOUSAND/ΜL (ref 0–0.61)
EOSINOPHIL NFR BLD AUTO: 11 % (ref 0–6)
ERYTHROCYTE [DISTWIDTH] IN BLOOD BY AUTOMATED COUNT: 15.7 % (ref 11.6–15.1)
GFR SERPL CREATININE-BSD FRML MDRD: 4 ML/MIN/1.73SQ M
GLUCOSE SERPL-MCNC: 160 MG/DL (ref 65–140)
GLUCOSE SERPL-MCNC: 171 MG/DL (ref 65–140)
HCT VFR BLD AUTO: 32.6 % (ref 36.5–49.3)
HGB BLD-MCNC: 10.4 G/DL (ref 12–17)
IMM GRANULOCYTES # BLD AUTO: 0.03 THOUSAND/UL (ref 0–0.2)
IMM GRANULOCYTES NFR BLD AUTO: 0 % (ref 0–2)
INR PPP: 1.02 (ref 0.84–1.19)
LYMPHOCYTES # BLD AUTO: 1.57 THOUSANDS/ΜL (ref 0.6–4.47)
LYMPHOCYTES NFR BLD AUTO: 20 % (ref 14–44)
MAGNESIUM SERPL-MCNC: 2.5 MG/DL (ref 1.6–2.6)
MCH RBC QN AUTO: 31.1 PG (ref 26.8–34.3)
MCHC RBC AUTO-ENTMCNC: 31.9 G/DL (ref 31.4–37.4)
MCV RBC AUTO: 98 FL (ref 82–98)
MONOCYTES # BLD AUTO: 0.6 THOUSAND/ΜL (ref 0.17–1.22)
MONOCYTES NFR BLD AUTO: 8 % (ref 4–12)
NEUTROPHILS # BLD AUTO: 4.75 THOUSANDS/ΜL (ref 1.85–7.62)
NEUTS SEG NFR BLD AUTO: 60 % (ref 43–75)
NRBC BLD AUTO-RTO: 0 /100 WBCS
PLATELET # BLD AUTO: 235 THOUSANDS/UL (ref 149–390)
PMV BLD AUTO: 9.3 FL (ref 8.9–12.7)
POTASSIUM SERPL-SCNC: 5.6 MMOL/L (ref 3.5–5.3)
PROT SERPL-MCNC: 6.4 G/DL (ref 6.4–8.2)
PROTHROMBIN TIME: 12.8 SECONDS (ref 11.6–14.5)
RBC # BLD AUTO: 3.34 MILLION/UL (ref 3.88–5.62)
SODIUM SERPL-SCNC: 143 MMOL/L (ref 136–145)
WBC # BLD AUTO: 7.85 THOUSAND/UL (ref 4.31–10.16)

## 2020-02-20 PROCEDURE — 85025 COMPLETE CBC W/AUTO DIFF WBC: CPT | Performed by: EMERGENCY MEDICINE

## 2020-02-20 PROCEDURE — 99285 EMERGENCY DEPT VISIT HI MDM: CPT

## 2020-02-20 PROCEDURE — 85730 THROMBOPLASTIN TIME PARTIAL: CPT | Performed by: EMERGENCY MEDICINE

## 2020-02-20 PROCEDURE — 36415 COLL VENOUS BLD VENIPUNCTURE: CPT | Performed by: EMERGENCY MEDICINE

## 2020-02-20 PROCEDURE — 99223 1ST HOSP IP/OBS HIGH 75: CPT | Performed by: INTERNAL MEDICINE

## 2020-02-20 PROCEDURE — 3E1M39Z IRRIGATION OF PERITONEAL CAVITY USING DIALYSATE, PERCUTANEOUS APPROACH: ICD-10-PCS | Performed by: INTERNAL MEDICINE

## 2020-02-20 PROCEDURE — 85610 PROTHROMBIN TIME: CPT | Performed by: EMERGENCY MEDICINE

## 2020-02-20 PROCEDURE — 70551 MRI BRAIN STEM W/O DYE: CPT

## 2020-02-20 PROCEDURE — 80053 COMPREHEN METABOLIC PANEL: CPT | Performed by: EMERGENCY MEDICINE

## 2020-02-20 PROCEDURE — 96374 THER/PROPH/DIAG INJ IV PUSH: CPT

## 2020-02-20 PROCEDURE — 82948 REAGENT STRIP/BLOOD GLUCOSE: CPT

## 2020-02-20 PROCEDURE — 93005 ELECTROCARDIOGRAM TRACING: CPT

## 2020-02-20 PROCEDURE — 83735 ASSAY OF MAGNESIUM: CPT | Performed by: EMERGENCY MEDICINE

## 2020-02-20 PROCEDURE — 99285 EMERGENCY DEPT VISIT HI MDM: CPT | Performed by: EMERGENCY MEDICINE

## 2020-02-20 PROCEDURE — 70450 CT HEAD/BRAIN W/O DYE: CPT

## 2020-02-20 RX ORDER — ACETAMINOPHEN 325 MG/1
650 TABLET ORAL EVERY 6 HOURS PRN
Status: DISCONTINUED | OUTPATIENT
Start: 2020-02-20 | End: 2020-02-21 | Stop reason: HOSPADM

## 2020-02-20 RX ORDER — NIFEDIPINE 30 MG/1
60 TABLET, EXTENDED RELEASE ORAL DAILY
Status: DISCONTINUED | OUTPATIENT
Start: 2020-02-21 | End: 2020-02-21

## 2020-02-20 RX ORDER — INSULIN GLARGINE 100 [IU]/ML
12 INJECTION, SOLUTION SUBCUTANEOUS
Status: DISCONTINUED | OUTPATIENT
Start: 2020-02-20 | End: 2020-02-21

## 2020-02-20 RX ORDER — ATORVASTATIN CALCIUM 40 MG/1
40 TABLET, FILM COATED ORAL EVERY EVENING
Qty: 90 TABLET | Refills: 1 | Status: SHIPPED | OUTPATIENT
Start: 2020-02-20 | End: 2020-09-08 | Stop reason: SDUPTHER

## 2020-02-20 RX ORDER — ESCITALOPRAM OXALATE 10 MG/1
10 TABLET ORAL DAILY
Status: DISCONTINUED | OUTPATIENT
Start: 2020-02-21 | End: 2020-02-21 | Stop reason: HOSPADM

## 2020-02-20 RX ORDER — ASPIRIN 81 MG/1
81 TABLET, CHEWABLE ORAL DAILY
Status: DISCONTINUED | OUTPATIENT
Start: 2020-02-21 | End: 2020-02-21 | Stop reason: HOSPADM

## 2020-02-20 RX ORDER — HYDRALAZINE HYDROCHLORIDE 25 MG/1
50 TABLET, FILM COATED ORAL 3 TIMES DAILY
Status: DISCONTINUED | OUTPATIENT
Start: 2020-02-20 | End: 2020-02-21 | Stop reason: HOSPADM

## 2020-02-20 RX ORDER — LACTOBACILLUS ACIDOPHILUS / LACTOBACILLUS BULGARICUS 100 MILLION CFU STRENGTH
1 GRANULES ORAL
Status: DISCONTINUED | OUTPATIENT
Start: 2020-02-20 | End: 2020-02-21 | Stop reason: HOSPADM

## 2020-02-20 RX ORDER — MECLIZINE HCL 12.5 MG/1
25 TABLET ORAL ONCE
Status: COMPLETED | OUTPATIENT
Start: 2020-02-20 | End: 2020-02-20

## 2020-02-20 RX ORDER — LABETALOL 20 MG/4 ML (5 MG/ML) INTRAVENOUS SYRINGE
10 EVERY 6 HOURS PRN
Status: DISCONTINUED | OUTPATIENT
Start: 2020-02-20 | End: 2020-02-20

## 2020-02-20 RX ORDER — LABETALOL 20 MG/4 ML (5 MG/ML) INTRAVENOUS SYRINGE
10 EVERY 6 HOURS PRN
Status: DISCONTINUED | OUTPATIENT
Start: 2020-02-20 | End: 2020-02-21 | Stop reason: HOSPADM

## 2020-02-20 RX ORDER — HEPARIN SODIUM 5000 [USP'U]/ML
5000 INJECTION, SOLUTION INTRAVENOUS; SUBCUTANEOUS EVERY 8 HOURS SCHEDULED
Status: DISCONTINUED | OUTPATIENT
Start: 2020-02-20 | End: 2020-02-21 | Stop reason: HOSPADM

## 2020-02-20 RX ORDER — GENTAMICIN SULFATE 1 MG/G
1 OINTMENT TOPICAL DAILY
Status: DISCONTINUED | OUTPATIENT
Start: 2020-02-21 | End: 2020-02-21 | Stop reason: HOSPADM

## 2020-02-20 RX ORDER — ONDANSETRON 2 MG/ML
4 INJECTION INTRAMUSCULAR; INTRAVENOUS EVERY 6 HOURS PRN
Status: DISCONTINUED | OUTPATIENT
Start: 2020-02-20 | End: 2020-02-21 | Stop reason: HOSPADM

## 2020-02-20 RX ORDER — ATORVASTATIN CALCIUM 40 MG/1
40 TABLET, FILM COATED ORAL
Status: DISCONTINUED | OUTPATIENT
Start: 2020-02-20 | End: 2020-02-21 | Stop reason: HOSPADM

## 2020-02-20 RX ORDER — TORSEMIDE 100 MG/1
100 TABLET ORAL
Status: DISCONTINUED | OUTPATIENT
Start: 2020-02-21 | End: 2020-02-21 | Stop reason: HOSPADM

## 2020-02-20 RX ORDER — MELATONIN
2000 DAILY
Status: DISCONTINUED | OUTPATIENT
Start: 2020-02-21 | End: 2020-02-21 | Stop reason: HOSPADM

## 2020-02-20 RX ORDER — ONDANSETRON 2 MG/ML
4 INJECTION INTRAMUSCULAR; INTRAVENOUS ONCE
Status: COMPLETED | OUTPATIENT
Start: 2020-02-20 | End: 2020-02-20

## 2020-02-20 RX ORDER — FAMOTIDINE 20 MG/1
20 TABLET, FILM COATED ORAL
Status: DISCONTINUED | OUTPATIENT
Start: 2020-02-20 | End: 2020-02-21 | Stop reason: HOSPADM

## 2020-02-20 RX ORDER — CINACALCET 30 MG/1
90 TABLET, FILM COATED ORAL DAILY
Status: DISCONTINUED | OUTPATIENT
Start: 2020-02-21 | End: 2020-02-21 | Stop reason: HOSPADM

## 2020-02-20 RX ORDER — GABAPENTIN 100 MG/1
100 CAPSULE ORAL
Status: DISCONTINUED | OUTPATIENT
Start: 2020-02-20 | End: 2020-02-21 | Stop reason: HOSPADM

## 2020-02-20 RX ORDER — LABETALOL 100 MG/1
150 TABLET, FILM COATED ORAL 2 TIMES DAILY
Status: DISCONTINUED | OUTPATIENT
Start: 2020-02-20 | End: 2020-02-21 | Stop reason: HOSPADM

## 2020-02-20 RX ORDER — CLONIDINE HYDROCHLORIDE 0.1 MG/1
0.1 TABLET ORAL 3 TIMES DAILY
Status: DISCONTINUED | OUTPATIENT
Start: 2020-02-20 | End: 2020-02-21 | Stop reason: HOSPADM

## 2020-02-20 RX ORDER — LISINOPRIL 20 MG/1
40 TABLET ORAL DAILY
Status: DISCONTINUED | OUTPATIENT
Start: 2020-02-21 | End: 2020-02-21 | Stop reason: HOSPADM

## 2020-02-20 RX ORDER — ASPIRIN 325 MG
325 TABLET ORAL ONCE
Status: COMPLETED | OUTPATIENT
Start: 2020-02-20 | End: 2020-02-20

## 2020-02-20 RX ADMIN — Medication 1 PACKET: at 21:37

## 2020-02-20 RX ADMIN — HEPARIN SODIUM 5000 UNITS: 5000 INJECTION INTRAVENOUS; SUBCUTANEOUS at 21:37

## 2020-02-20 RX ADMIN — LABETALOL HYDROCHLORIDE 150 MG: 100 TABLET, FILM COATED ORAL at 21:36

## 2020-02-20 RX ADMIN — INSULIN GLARGINE 12 UNITS: 100 INJECTION, SOLUTION SUBCUTANEOUS at 21:49

## 2020-02-20 RX ADMIN — ASPIRIN 325 MG ORAL TABLET 325 MG: 325 PILL ORAL at 18:55

## 2020-02-20 RX ADMIN — ATORVASTATIN CALCIUM 40 MG: 40 TABLET, FILM COATED ORAL at 21:37

## 2020-02-20 RX ADMIN — ONDANSETRON 4 MG: 2 INJECTION INTRAMUSCULAR; INTRAVENOUS at 23:57

## 2020-02-20 RX ADMIN — ONDANSETRON 4 MG: 2 INJECTION INTRAMUSCULAR; INTRAVENOUS at 18:55

## 2020-02-20 RX ADMIN — GABAPENTIN 100 MG: 100 CAPSULE ORAL at 21:37

## 2020-02-20 RX ADMIN — FAMOTIDINE 20 MG: 20 TABLET ORAL at 21:37

## 2020-02-20 RX ADMIN — MECLIZINE 25 MG: 12.5 TABLET ORAL at 18:08

## 2020-02-20 RX ADMIN — CLONIDINE HYDROCHLORIDE 0.1 MG: 0.1 TABLET ORAL at 21:45

## 2020-02-20 NOTE — ED PROVIDER NOTES
History  Chief Complaint   Patient presents with    Dizziness     patient reports dizziness x 2 days  denies headaches, CP, or SOB       History provided by:  Patient   used: No    18-year-old male insulin-dependent diabetic with a history of end-stage renal disease on peritoneal dialysis with history of cerebellar CVA x2 with resultant chronic vertigo presented with worsening dizziness/vertigo over the last 2 days  Also reports feeling like he has had some word-finding difficulty  No associated headache, nausea, vomiting, visual changes, chest pain or shortness of breath, neck pain, fever, chills  No recent falls or injuries  On exam here he has no focal deficits  HINTS exam is normal   Finger-to-nose intact  He has chronic instability with gait, uses a cane  Will check CT head, EKG, labs and re-evaluate  Differential diagnosis includes position vertigo, CVA, viral labyrinthitis  Prior to Admission Medications   Prescriptions Last Dose Informant Patient Reported? Taking? ADMELOG SOLOSTAR 100 units/mL injection pen   No No   Sig: INJECT 10 UNITS WITH A SLIDING SCALE OF 1 UNIT FOR EVERY 25 OVER 150 SUBCUTANEOUSLY THREE TIMES DAILY WITH MEALS   B-D ULTRAFINE III SHORT PEN 31G X 8 MM MISC   No No   Sig: USE 1 PEN NEEDLE 8 TIMES DAILY   Cholecalciferol (VITAMIN D) 2000 units CAPS  Mother Yes No   Sig: Take 1 capsule by mouth daily   Ergocalciferol (VITAMIN D2 PO)   Yes No   Sig: Vitamin D2 1,250 mcg (50,000 unit) capsule   GLUCAGON EMERGENCY 1 MG injection   Yes No   Sig: INJECT 1MG SUBCUTANEOUSLY AS NEEDED (AS DIRECTED)   MAY REPEAT DOSE EVERY 20 MINUTES AS NEEDED   NIFEdipine ER (ADALAT CC) 60 MG 24 hr tablet  Mother No No   Sig: Take 1 tablet (60 mg total) by mouth daily   Patient taking differently: Take 60 mg by mouth 2 (two) times a day     NON FORMULARY   Yes No   Sig: Medical marijuana, has medical card   Probiotic Product (PROBIOTIC DAILY) CAPS  Self Yes No   Sig: Take 1 capsule by mouth daily   Sucroferric Oxyhydroxide (VELPHORO PO)   Yes No   Sig: Velphoro   aspirin 81 mg chewable tablet  Mother Yes No   Sig: Chew 81 mg daily   atorvastatin (LIPITOR) 40 mg tablet   No No   Sig: Take 1 tablet (40 mg total) by mouth every evening   b complex vitamins capsule  Mother Yes No   Sig: Take 1 capsule by mouth daily   calcium acetate (PHOSLO) 667 mg capsule  Mother No No   Sig: Take 1 capsule (667 mg total) by mouth 3 (three) times a day with meals   cinacalcet (SENSIPAR) 90 MG tablet   Yes No   Sig: Take 90 mg by mouth daily   cloNIDine (CATAPRES) 0 1 mg tablet   Yes No   Sig: Take 0 2 mg by mouth 3 (three) times a day May add another 0 1 as needed   ergocalciferol (ERGOCALCIFEROL) 55287 units capsule   Yes No   Sig: Take 2,000 Units by mouth daily    escitalopram (LEXAPRO) 10 mg tablet   No No   Sig: Take 1 tablet (10 mg total) by mouth daily   famotidine (PEPCID) 20 mg tablet   No No   Sig: Take 1 tablet (20 mg total) by mouth daily at bedtime   gabapentin (NEURONTIN) 100 mg capsule   Yes No   Sig: Take 100 mg by mouth daily at bedtime   gentamicin (GARAMYCIN) 0 1 % cream   Yes No   hydrALAZINE (APRESOLINE) 100 MG tablet  Self No No   Sig: Take 0 5 tablets (50 mg total) by mouth 3 (three) times a day   Patient taking differently: Take 100 mg by mouth 3 (three) times a day 100 mg twice daily   hydrOXYzine HCL (ATARAX) 10 mg tablet   No No   Sig: Take 1 tablet (10 mg total) by mouth every 6 (six) hours as needed for itching   insulin glargine (BASAGLAR KWIKPEN) 100 units/mL injection pen   No No   Sig: Inject 12u in AM and 15u in PM   labetalol (NORMODYNE) 300 mg tablet  Mother No No   Sig: Take 0 5 tablets (150 mg total) by mouth every 8 (eight) hours   Patient taking differently: Take 300 mg by mouth as needed    lanthanum (FOSRENOL) 1000 MG chewable tablet   Yes No   Sig: CHEW 1 TABLET BY MOUTH WITH MEALS AND 1 TAB WITH SNACKS DAILY   lisinopril (ZESTRIL) 40 mg tablet  Mother Yes No   Sig: Take 40 mg by mouth daily   mupirocin (BACTROBAN) 2 % ointment   No No   Sig: Apply topically 3 (three) times a day   ondansetron (ZOFRAN) 4 mg tablet   No No   Sig: Take 1 tablet (4 mg total) by mouth every 8 (eight) hours as needed for nausea or vomiting   Patient taking differently: Take 4 mg by mouth every 8 (eight) hours as needed for nausea or vomiting    torsemide (DEMADEX) 100 mg tablet   Yes No   Sig: Take 100 mg by mouth every morning      Facility-Administered Medications: None       Past Medical History:   Diagnosis Date    Acute kidney injury ([facility] [address] )     Anxiety     Cerebellar stroke side undetermined 2015 2015,1/2018    Diabetes type 1, controlled ([facility] )     IDDM    Diarrhea     History of shingles 2010    History of transfusion 02/2018    Hypertension     Muscle weakness     general unsteadiness    Retinopathy        Past Surgical History:   Procedure Laterality Date    CARDIAC LOOP RECORDER  05/2018    EGD      EYE SURGERY      PERITONEAL CATHETER INSERTION N/A 8/27/2018    Procedure: UNROOF PD CATHETER;  Surgeon: Gurmeet De León DO;  [facility]: [facility];  Service: General    WA ESOPHAGOGASTRODUODENOSCOPY TRANSORAL DIAGNOSTIC N/A 4/18/2019    Procedure: ESOPHAGOGASTRODUODENOSCOPY (EGD); Surgeon: Roman Redd MD;  Location: [facility];   Service: Gastroenterology    [place] LAP INSERTION TUNNELED INTRAPERITONEAL CATHETER N/A 8/6/2018    Procedure: LAPAROSCOPIC PD CATHETER PLACEMENT;  Surgeon: Gurmeet De León DO;  Location: [facility];  Service: General    TONSILLECTOMY         Family History   Problem Relation Age of Onset   Humza Lewis Breast cancer Mother     Hypertension Mother     Hyperlipidemia Father     Hypertension Father     Leukemia Maternal Grandmother     Hyperlipidemia Maternal Grandfather     Hypertension Maternal Grandfather     Hyperlipidemia Paternal Grandmother     Hypertension Paternal Grandmother     Heart disease Paternal Grandfather         cardiac disorder    Diabetes Paternal Grandfather      I have reviewed and agree with the history as documented  Social History     Tobacco Use    Smoking status: Former Smoker     Packs/day: 0 50     Years: 12 00     Pack years: 6 00     Types: Cigarettes     Last attempt to quit: 2018     Years since quittin 0    Smokeless tobacco: Never Used    Tobacco comment: quit 2018   Substance Use Topics    Alcohol use: Not Currently     Comment: rarely    Drug use: Yes     Frequency: 5 0 times per week     Types: Marijuana     Comment: medical       Review of Systems   Constitutional: Negative for activity change, appetite change and fever  Respiratory: Negative for chest tightness and shortness of breath  Gastrointestinal: Negative for abdominal pain, nausea and vomiting  Musculoskeletal: Negative for back pain and neck pain  Skin: Negative for color change and rash  Neurological: Positive for dizziness, speech difficulty and light-headedness  Negative for tremors, weakness, numbness and headaches  All other systems reviewed and are negative  Physical Exam  Physical Exam   Constitutional: He is oriented to person, place, and time  He appears well-developed and well-nourished  No distress  HENT:   Head: Normocephalic  Eyes: Pupils are equal, round, and reactive to light  EOM are normal    Normal head impulse, test of skew  No nystagmus  Neck: Normal range of motion  Neck supple  No JVD present  No carotid bruit  Cardiovascular: Normal rate, regular rhythm and intact distal pulses  Pulmonary/Chest: Effort normal and breath sounds normal    Abdominal: Soft  He exhibits no distension  Musculoskeletal: Normal range of motion  He exhibits no edema  Neurological: He is alert and oriented to person, place, and time  No cranial nerve deficit or sensory deficit  He exhibits normal muscle tone  Coordination normal    Skin: Skin is warm and dry  Psychiatric: He has a normal mood and affect   His behavior is normal    Nursing note and vitals reviewed        Vital Signs  ED Triage Vitals [02/20/20 1700]   Temperature Pulse Respirations Blood Pressure SpO2   99 1 °F (37 3 °C) 85 18 (!) 189/93 99 %      Temp Source Heart Rate Source Patient Position - Orthostatic VS BP Location FiO2 (%)   Oral Monitor Lying Right arm --      Pain Score       --           Vitals:    02/20/20 1700   BP: (!) 189/93   Pulse: 85   Patient Position - Orthostatic VS: Lying         Visual Acuity      ED Medications  Medications   meclizine (ANTIVERT) tablet 25 mg (25 mg Oral Given 2/20/20 1808)   aspirin tablet 325 mg (325 mg Oral Given 2/20/20 1855)   ondansetron (ZOFRAN) injection 4 mg (4 mg Intravenous Given 2/20/20 1855)       Diagnostic Studies  Results Reviewed     Procedure Component Value Units Date/Time    Comprehensive metabolic panel [[id number]]  (Abnormal) Collected:  02/20/20 1804    Lab Status:  Final result Specimen:  Blood from Arm, Left Updated:  02/20/20 1837     Sodium 143 mmol/L      Potassium 5 6 mmol/L      Chloride 101 mmol/L      CO2 29 mmol/L      ANION GAP 13 mmol/L      BUN 53 mg/dL      Creatinine 15 32 mg/dL      Glucose 160 mg/dL      Calcium 8 7 mg/dL      AST 20 U/L      ALT 22 U/L      Alkaline Phosphatase 77 U/L      Total Protein 6 4 g/dL      Albumin 3 3 g/dL      Total Bilirubin 0 43 mg/dL      eGFR 4 ml/min/1 73sq m     Narrative:       [place] guidelines for Chronic Kidney Disease (CKD):     Stage 1 with normal or high GFR (GFR > 90 mL/min/1 73 square meters)    Stage 2 Mild CKD (GFR = 60-89 mL/min/1 73 square meters)    Stage 3A Moderate CKD (GFR = 45-59 mL/min/1 73 square meters)    Stage 3B Moderate CKD (GFR = 30-44 mL/min/1 73 square meters)    Stage 4 Severe CKD (GFR = 15-29 mL/min/1 73 square meters)    Stage 5 End Stage CKD (GFR <15 mL/min/1 73 square meters)  Note: GFR calculation is accurate only with a steady state creatinine    Magnesium [[id number]] (Normal) Collected:  02/20/20 1804    Lab Status:  Final result Specimen:  Blood from Arm, Left Updated:  02/20/20 1837     Magnesium 2 5 mg/dL     Protime-INR [[id number]]  (Normal) Collected:  02/20/20 1804    Lab Status:  Final result Specimen:  Blood from Arm, Left Updated:  02/20/20 1830     Protime 12 8 seconds      INR 1 02    APTT [[id number]]  (Normal) Collected:  02/20/20 1804    Lab Status:  Final result Specimen:  Blood from Arm, Left Updated:  02/20/20 1830     PTT 33 seconds     CBC and differential [[id number]]  (Abnormal) Collected:  02/20/20 1804    Lab Status:  Final result Specimen:  Blood from Arm, Left Updated:  02/20/20 1813     WBC 7 85 Thousand/uL      RBC 3 34 Million/uL      Hemoglobin 10 4 g/dL      Hematocrit 32 6 %      MCV 98 fL      MCH 31 1 pg      MCHC 31 9 g/dL      RDW 15 7 %      MPV 9 3 fL      Platelets 335 Thousands/uL      nRBC 0 /100 WBCs      Neutrophils Relative 60 %      Immat GRANS % 0 %      Lymphocytes Relative 20 %      Monocytes Relative 8 %      Eosinophils Relative 11 %      Basophils Relative 1 %      Neutrophils Absolute 4 75 Thousands/µL      Immature Grans Absolute 0 03 Thousand/uL      Lymphocytes Absolute 1 57 Thousands/µL      Monocytes Absolute 0 60 Thousand/µL      Eosinophils Absolute 0 83 Thousand/µL      Basophils Absolute 0 07 Thousands/µL                  CT head without contrast   Final Result by Joyce Espinal MD (02/20 1829)      No acute intracranial abnormality  Workstation performed: [id number]                    Procedures  ECG 12 Lead Documentation Only  Date/Time: 2/20/2020 6:10 PM  Performed by: Ravindra Lewis MD  Authorized by: Ravindra Lewis MD     Indications / Diagnosis:  Vertigo, possible CVA  ECG reviewed by me, the ED Provider: yes    Patient location:  ED  Previous ECG:     Previous ECG:  Compared to current    Comparison ECG info:  12/9/19    Comparison to previous ECG: Nonspecific T-wave changes    Rate: ECG rate:  85  Rhythm:     Rhythm: sinus rhythm    Ectopy:     Ectopy: none    QRS:     QRS axis:  Normal  Conduction:     Conduction: normal    ST segments:     ST segments:  Normal  T waves:     T waves: non-specific               ED Course             Stroke Assessment     Row Name 02/20/20 1848             NIH Stroke Scale    Interval  Baseline      Level of Consciousness (1a )  0      LOC Questions (1b )  0      LOC Commands (1c )  0      Best Gaze (2 )  0      Visual (3 )  0      Facial Palsy (4 )  0      Motor Arm, Left (5a )  0      Motor Arm, Right (5b )  0      Motor Leg, Left (6a )  0      Motor Leg, Right (6b )  0      Limb Ataxia (7 )  0      Sensory (8 )  0      Best Language (9 )  0      Dysarthria (10 )  0      Extinction and Inattention (11 ) (Formerly Neglect)  0      Total  0          First Filed Value   TPA Decision  Patient not a TPA candidate  Patient is not a candidate options  Unclear time of onset outside appropriate time window  MDM  Number of Diagnoses or Management Options  Diagnosis management comments: 70-year-old male with insulin-dependent diabetes end-stage renal disease on PD, history of cerebellar CVA with chronic vertigo now with worsening symptoms the last 2 days  Differential diagnosis includes subacute CVA  No improvement in symptoms with meclizine  CT head, EKG and labs unremarkable  Patient admitted for further workup, neurology consultation         Amount and/or Complexity of Data Reviewed  Clinical lab tests: ordered and reviewed  Tests in the radiology section of CPT®: reviewed and ordered  Discuss the patient with other providers: yes  Independent visualization of images, tracings, or specimens: yes          Disposition  Final diagnoses:   Vertigo     Time reflects when diagnosis was documented in both MDM as applicable and the Disposition within this note     Time User Action Codes Description Comment    2/20/2020  6:56 PM Catherine Goldstein J Add [R42] Vertigo       ED Disposition     ED Disposition Condition Date/Time Comment    Admit Stable Thu Feb 20, 2020  6:56 PM Case was discussed with Dr Gregg Kruger and the patient's admission status was agreed to be Admission Status: inpatient status to the service of Dr Gregg Kruger   Follow-up Information    None         Patient's Medications   Discharge Prescriptions    No medications on file     No discharge procedures on file      PDMP Review     None          ED Provider  Electronically Signed by           Deedee Hidalgo MD  02/20/20 2463

## 2020-02-21 ENCOUNTER — HOSPITAL ENCOUNTER (INPATIENT)
Facility: HOSPITAL | Age: 35
LOS: 7 days | DRG: 100 | End: 2020-02-28
Attending: ANESTHESIOLOGY | Admitting: INTERNAL MEDICINE
Payer: MEDICARE

## 2020-02-21 ENCOUNTER — APPOINTMENT (INPATIENT)
Dept: RADIOLOGY | Facility: HOSPITAL | Age: 35
DRG: 058 | End: 2020-02-21
Payer: MEDICARE

## 2020-02-21 ENCOUNTER — APPOINTMENT (INPATIENT)
Dept: NEUROLOGY | Facility: CLINIC | Age: 35
DRG: 100 | End: 2020-02-21
Payer: MEDICARE

## 2020-02-21 VITALS
WEIGHT: 220.6 LBS | SYSTOLIC BLOOD PRESSURE: 244 MMHG | TEMPERATURE: 97.9 F | BODY MASS INDEX: 30.88 KG/M2 | RESPIRATION RATE: 16 BRPM | DIASTOLIC BLOOD PRESSURE: 129 MMHG | HEIGHT: 71 IN | HEART RATE: 131 BPM | OXYGEN SATURATION: 100 %

## 2020-02-21 DIAGNOSIS — K31.84 GASTROPARESIS DIABETICORUM (HCC): Primary | ICD-10-CM

## 2020-02-21 DIAGNOSIS — E10.649 TYPE 1 DIABETES MELLITUS WITH HYPOGLYCEMIA AND WITHOUT COMA (HCC): Chronic | ICD-10-CM

## 2020-02-21 DIAGNOSIS — N18.6 END-STAGE RENAL DISEASE ON PERITONEAL DIALYSIS (HCC): ICD-10-CM

## 2020-02-21 DIAGNOSIS — Z99.2 END-STAGE RENAL DISEASE ON PERITONEAL DIALYSIS (HCC): ICD-10-CM

## 2020-02-21 DIAGNOSIS — E11.43 GASTROPARESIS DIABETICORUM (HCC): Primary | ICD-10-CM

## 2020-02-21 PROBLEM — E87.5 HYPERKALEMIA: Status: RESOLVED | Noted: 2018-02-10 | Resolved: 2020-02-21

## 2020-02-21 PROBLEM — R56.9 SEIZURE-LIKE ACTIVITY (HCC): Status: ACTIVE | Noted: 2020-02-21

## 2020-02-21 LAB
ANCILLARY VALUES: ABNORMAL
ANION GAP SERPL CALCULATED.3IONS-SCNC: 13 MMOL/L (ref 4–13)
ANION GAP SERPL CALCULATED.3IONS-SCNC: 15 MMOL/L (ref 4–13)
ANION GAP SERPL CALCULATED.3IONS-SCNC: 18 MMOL/L (ref 4–13)
ARTERIAL PATENCY WRIST A: 3
ARTERIAL PATENCY WRIST A: YES
ATRIAL RATE: 85 BPM
BASE EXCESS BLDA CALC-SCNC: -0.8 MMOL/L
BASE EXCESS BLDA CALC-SCNC: -2 MMOL/L (ref -2–3)
BUN SERPL-MCNC: 50 MG/DL (ref 5–25)
BUN SERPL-MCNC: 50 MG/DL (ref 5–25)
BUN SERPL-MCNC: 52 MG/DL (ref 5–25)
CA-I BLD-SCNC: 1.1 MMOL/L (ref 1.12–1.32)
CALCIUM SERPL-MCNC: 8.9 MG/DL (ref 8.3–10.1)
CALCIUM SERPL-MCNC: 9 MG/DL (ref 8.3–10.1)
CALCIUM SERPL-MCNC: 9 MG/DL (ref 8.3–10.1)
CHLORIDE SERPL-SCNC: 100 MMOL/L (ref 100–108)
CHLORIDE SERPL-SCNC: 100 MMOL/L (ref 100–108)
CHLORIDE SERPL-SCNC: 101 MMOL/L (ref 100–108)
CHOLEST SERPL-MCNC: 128 MG/DL (ref 50–200)
CO2 SERPL-SCNC: 24 MMOL/L (ref 21–32)
CO2 SERPL-SCNC: 24 MMOL/L (ref 21–32)
CO2 SERPL-SCNC: 27 MMOL/L (ref 21–32)
CREAT SERPL-MCNC: 15.2 MG/DL (ref 0.6–1.3)
CREAT SERPL-MCNC: 15.44 MG/DL (ref 0.6–1.3)
CREAT SERPL-MCNC: 15.82 MG/DL (ref 0.6–1.3)
DS:DELIVERY SYSTEM: AC
ERYTHROCYTE [DISTWIDTH] IN BLOOD BY AUTOMATED COUNT: 15.5 % (ref 11.6–15.1)
ERYTHROCYTE [DISTWIDTH] IN BLOOD BY AUTOMATED COUNT: 15.5 % (ref 11.6–15.1)
ERYTHROCYTE [DISTWIDTH] IN BLOOD BY AUTOMATED COUNT: 15.6 % (ref 11.6–15.1)
EST. AVERAGE GLUCOSE BLD GHB EST-MCNC: 114 MG/DL
FIO2 GAS DIL.REBREATH: 60 L
GFR SERPL CREATININE-BSD FRML MDRD: 3 ML/MIN/1.73SQ M
GFR SERPL CREATININE-BSD FRML MDRD: 4 ML/MIN/1.73SQ M
GFR SERPL CREATININE-BSD FRML MDRD: 4 ML/MIN/1.73SQ M
GLUCOSE SERPL-MCNC: 205 MG/DL (ref 65–140)
GLUCOSE SERPL-MCNC: 212 MG/DL (ref 65–140)
GLUCOSE SERPL-MCNC: 216 MG/DL (ref 65–140)
GLUCOSE SERPL-MCNC: 219 MG/DL (ref 65–140)
GLUCOSE SERPL-MCNC: 226 MG/DL (ref 65–140)
GLUCOSE SERPL-MCNC: 255 MG/DL (ref 65–140)
GLUCOSE SERPL-MCNC: 275 MG/DL (ref 65–140)
GLUCOSE SERPL-MCNC: 49 MG/DL (ref 65–140)
GLUCOSE SERPL-MCNC: 55 MG/DL (ref 65–140)
GLUCOSE SERPL-MCNC: 64 MG/DL (ref 65–140)
GLUCOSE SERPL-MCNC: 69 MG/DL (ref 65–140)
GLUCOSE SERPL-MCNC: 69 MG/DL (ref 65–140)
GLUCOSE SERPL-MCNC: 70 MG/DL (ref 65–140)
GLUCOSE SERPL-MCNC: 72 MG/DL (ref 65–140)
GLUCOSE SERPL-MCNC: 85 MG/DL (ref 65–140)
GLUCOSE SERPL-MCNC: 85 MG/DL (ref 65–140)
GLUCOSE SERPL-MCNC: 89 MG/DL (ref 65–140)
GLUCOSE SERPL-MCNC: 93 MG/DL (ref 65–140)
HBA1C MFR BLD: 5.6 %
HCO3 BLDA-SCNC: 22.8 MMOL/L (ref 22–28)
HCO3 BLDA-SCNC: 23.2 MMOL/L (ref 22–28)
HCT VFR BLD AUTO: 27.2 % (ref 36.5–49.3)
HCT VFR BLD AUTO: 29 % (ref 36.5–49.3)
HCT VFR BLD AUTO: 34.1 % (ref 36.5–49.3)
HCT VFR BLD CALC: 30 % (ref 36.5–49.3)
HDLC SERPL-MCNC: 38 MG/DL
HGB BLD-MCNC: 11.1 G/DL (ref 12–17)
HGB BLD-MCNC: 9.2 G/DL (ref 12–17)
HGB BLD-MCNC: 9.8 G/DL (ref 12–17)
HGB BLDA-MCNC: 10.2 G/DL (ref 12–17)
HOROWITZ INDEX BLDA+IHG-RTO: 50 MM[HG]
LACTATE SERPL-SCNC: 3.5 MMOL/L (ref 0.5–2)
LACTATE SERPL-SCNC: 5.1 MMOL/L (ref 0.5–2)
LDLC SERPL CALC-MCNC: 71 MG/DL (ref 0–100)
MAGNESIUM SERPL-MCNC: 2.8 MG/DL (ref 1.6–2.6)
MAGNESIUM SERPL-MCNC: 2.8 MG/DL (ref 1.6–2.6)
MCH RBC QN AUTO: 31.4 PG (ref 26.8–34.3)
MCH RBC QN AUTO: 32.5 PG (ref 26.8–34.3)
MCH RBC QN AUTO: 32.6 PG (ref 26.8–34.3)
MCHC RBC AUTO-ENTMCNC: 32.6 G/DL (ref 31.4–37.4)
MCHC RBC AUTO-ENTMCNC: 33.8 G/DL (ref 31.4–37.4)
MCHC RBC AUTO-ENTMCNC: 33.8 G/DL (ref 31.4–37.4)
MCV RBC AUTO: 96 FL (ref 82–98)
MCV RBC AUTO: 96 FL (ref 82–98)
MCV RBC AUTO: 97 FL (ref 82–98)
O2 CT BLDA-SCNC: 13.5 ML/DL (ref 16–23)
OXYHGB MFR BLDA: 98.4 % (ref 94–97)
P AXIS: 65 DEGREES
PCO2 BLD: 24 MMOL/L (ref 21–32)
PCO2 BLD: 41.3 MM HG (ref 36–44)
PCO2 BLDA: 33.2 MM HG (ref 36–44)
PEEP RESPIRATORY: 5 CM[H2O]
PH BLD: 7.36 [PH] (ref 7.35–7.45)
PH BLDA: 7.45 [PH] (ref 7.35–7.45)
PLATELET # BLD AUTO: 253 THOUSANDS/UL (ref 149–390)
PLATELET # BLD AUTO: 261 THOUSANDS/UL (ref 149–390)
PLATELET # BLD AUTO: 271 THOUSANDS/UL (ref 149–390)
PMV BLD AUTO: 10.1 FL (ref 8.9–12.7)
PMV BLD AUTO: 9.3 FL (ref 8.9–12.7)
PMV BLD AUTO: 9.8 FL (ref 8.9–12.7)
PO2 BLD: >400 MM HG (ref 75–129)
PO2 BLDA: 170 MM HG (ref 75–129)
POTASSIUM BLD-SCNC: 5 MMOL/L (ref 3.5–5.3)
POTASSIUM SERPL-SCNC: 4.6 MMOL/L (ref 3.5–5.3)
POTASSIUM SERPL-SCNC: 4.8 MMOL/L (ref 3.5–5.3)
POTASSIUM SERPL-SCNC: 5.4 MMOL/L (ref 3.5–5.3)
PR INTERVAL: 144 MS
PRESSURE SETTING: 5
QRS AXIS: 62 DEGREES
QRSD INTERVAL: 72 MS
QT INTERVAL: 382 MS
QTC INTERVAL: 454 MS
RBC # BLD AUTO: 2.82 MILLION/UL (ref 3.88–5.62)
RBC # BLD AUTO: 3.02 MILLION/UL (ref 3.88–5.62)
RBC # BLD AUTO: 3.54 MILLION/UL (ref 3.88–5.62)
RESPIRATORY RATE: 20
SAMPLE SITE: ABNORMAL
SODIUM BLD-SCNC: 138 MMOL/L (ref 136–145)
SODIUM SERPL-SCNC: 137 MMOL/L (ref 136–145)
SODIUM SERPL-SCNC: 142 MMOL/L (ref 136–145)
SODIUM SERPL-SCNC: 143 MMOL/L (ref 136–145)
SPECIMEN SOURCE: ABNORMAL
SPECIMEN SOURCE: ABNORMAL
T WAVE AXIS: 37 DEGREES
TRIGL SERPL-MCNC: 93 MG/DL
VALPROATE SERPL-MCNC: 36 UG/ML (ref 50–100)
VALPROATE SERPL-MCNC: 71 UG/ML (ref 50–100)
VENT AC: 20
VENT TYPE: ABNORMAL
VENT- AC: AC
VENTILATION VALUE: 500
VENTRICULAR RATE: 85 BPM
VT SETTING VENT: 500 ML
WBC # BLD AUTO: 7.82 THOUSAND/UL (ref 4.31–10.16)
WBC # BLD AUTO: 8.65 THOUSAND/UL (ref 4.31–10.16)
WBC # BLD AUTO: 9.21 THOUSAND/UL (ref 4.31–10.16)

## 2020-02-21 PROCEDURE — 82948 REAGENT STRIP/BLOOD GLUCOSE: CPT

## 2020-02-21 PROCEDURE — 83605 ASSAY OF LACTIC ACID: CPT | Performed by: NURSE PRACTITIONER

## 2020-02-21 PROCEDURE — 94002 VENT MGMT INPAT INIT DAY: CPT

## 2020-02-21 PROCEDURE — NC001 PR NO CHARGE: Performed by: PSYCHIATRY & NEUROLOGY

## 2020-02-21 PROCEDURE — 99291 CRITICAL CARE FIRST HOUR: CPT | Performed by: PSYCHIATRY & NEUROLOGY

## 2020-02-21 PROCEDURE — 71045 X-RAY EXAM CHEST 1 VIEW: CPT

## 2020-02-21 PROCEDURE — 95715 VEEG EA 12-26HR INTMT MNTR: CPT

## 2020-02-21 PROCEDURE — 99232 SBSQ HOSP IP/OBS MODERATE 35: CPT | Performed by: ANESTHESIOLOGY

## 2020-02-21 PROCEDURE — 94760 N-INVAS EAR/PLS OXIMETRY 1: CPT

## 2020-02-21 PROCEDURE — 85027 COMPLETE CBC AUTOMATED: CPT | Performed by: NURSE PRACTITIONER

## 2020-02-21 PROCEDURE — 0BH17EZ INSERTION OF ENDOTRACHEAL AIRWAY INTO TRACHEA, VIA NATURAL OR ARTIFICIAL OPENING: ICD-10-PCS | Performed by: NURSE PRACTITIONER

## 2020-02-21 PROCEDURE — 80061 LIPID PANEL: CPT | Performed by: INTERNAL MEDICINE

## 2020-02-21 PROCEDURE — 82947 ASSAY GLUCOSE BLOOD QUANT: CPT

## 2020-02-21 PROCEDURE — 0BH17EZ INSERTION OF ENDOTRACHEAL AIRWAY INTO TRACHEA, VIA NATURAL OR ARTIFICIAL OPENING: ICD-10-PCS | Performed by: INTERNAL MEDICINE

## 2020-02-21 PROCEDURE — 82803 BLOOD GASES ANY COMBINATION: CPT

## 2020-02-21 PROCEDURE — 99223 1ST HOSP IP/OBS HIGH 75: CPT | Performed by: INTERNAL MEDICINE

## 2020-02-21 PROCEDURE — 83036 HEMOGLOBIN GLYCOSYLATED A1C: CPT | Performed by: PSYCHIATRY & NEUROLOGY

## 2020-02-21 PROCEDURE — 93010 ELECTROCARDIOGRAM REPORT: CPT | Performed by: INTERNAL MEDICINE

## 2020-02-21 PROCEDURE — 85014 HEMATOCRIT: CPT

## 2020-02-21 PROCEDURE — 84295 ASSAY OF SERUM SODIUM: CPT

## 2020-02-21 PROCEDURE — 84132 ASSAY OF SERUM POTASSIUM: CPT

## 2020-02-21 PROCEDURE — 99291 CRITICAL CARE FIRST HOUR: CPT | Performed by: INTERNAL MEDICINE

## 2020-02-21 PROCEDURE — 36600 WITHDRAWAL OF ARTERIAL BLOOD: CPT

## 2020-02-21 PROCEDURE — 80164 ASSAY DIPROPYLACETIC ACD TOT: CPT | Performed by: NURSE PRACTITIONER

## 2020-02-21 PROCEDURE — 83735 ASSAY OF MAGNESIUM: CPT | Performed by: NURSE PRACTITIONER

## 2020-02-21 PROCEDURE — 99233 SBSQ HOSP IP/OBS HIGH 50: CPT | Performed by: PSYCHIATRY & NEUROLOGY

## 2020-02-21 PROCEDURE — 80048 BASIC METABOLIC PNL TOTAL CA: CPT | Performed by: PSYCHIATRY & NEUROLOGY

## 2020-02-21 PROCEDURE — 80048 BASIC METABOLIC PNL TOTAL CA: CPT | Performed by: NURSE PRACTITIONER

## 2020-02-21 PROCEDURE — 82330 ASSAY OF CALCIUM: CPT

## 2020-02-21 PROCEDURE — 85027 COMPLETE CBC AUTOMATED: CPT | Performed by: PSYCHIATRY & NEUROLOGY

## 2020-02-21 PROCEDURE — 5A1935Z RESPIRATORY VENTILATION, LESS THAN 24 CONSECUTIVE HOURS: ICD-10-PCS | Performed by: NURSE PRACTITIONER

## 2020-02-21 PROCEDURE — 82805 BLOOD GASES W/O2 SATURATION: CPT | Performed by: NURSE PRACTITIONER

## 2020-02-21 PROCEDURE — NC001 PR NO CHARGE: Performed by: NURSE PRACTITIONER

## 2020-02-21 PROCEDURE — 31500 INSERT EMERGENCY AIRWAY: CPT | Performed by: NURSE PRACTITIONER

## 2020-02-21 PROCEDURE — 94150 VITAL CAPACITY TEST: CPT

## 2020-02-21 PROCEDURE — 5A1945Z RESPIRATORY VENTILATION, 24-96 CONSECUTIVE HOURS: ICD-10-PCS | Performed by: INTERNAL MEDICINE

## 2020-02-21 RX ORDER — ATORVASTATIN CALCIUM 40 MG/1
40 TABLET, FILM COATED ORAL
Status: DISCONTINUED | OUTPATIENT
Start: 2020-02-21 | End: 2020-02-28 | Stop reason: HOSPADM

## 2020-02-21 RX ORDER — LABETALOL 100 MG/1
150 TABLET, FILM COATED ORAL 2 TIMES DAILY
Status: DISCONTINUED | OUTPATIENT
Start: 2020-02-21 | End: 2020-02-21

## 2020-02-21 RX ORDER — FAMOTIDINE 20 MG/1
20 TABLET, FILM COATED ORAL
Status: CANCELLED | OUTPATIENT
Start: 2020-02-21

## 2020-02-21 RX ORDER — GABAPENTIN 100 MG/1
100 CAPSULE ORAL
Status: CANCELLED | OUTPATIENT
Start: 2020-02-21

## 2020-02-21 RX ORDER — MELATONIN
2000 DAILY
Status: DISCONTINUED | OUTPATIENT
Start: 2020-02-22 | End: 2020-02-28 | Stop reason: HOSPADM

## 2020-02-21 RX ORDER — NIFEDIPINE 10 MG/1
20 CAPSULE ORAL EVERY 8 HOURS SCHEDULED
Status: DISCONTINUED | OUTPATIENT
Start: 2020-02-21 | End: 2020-02-21 | Stop reason: HOSPADM

## 2020-02-21 RX ORDER — ACETAMINOPHEN 160 MG/5ML
650 SUSPENSION, ORAL (FINAL DOSE FORM) ORAL EVERY 6 HOURS PRN
Status: DISCONTINUED | OUTPATIENT
Start: 2020-02-21 | End: 2020-02-23

## 2020-02-21 RX ORDER — HYDRALAZINE HYDROCHLORIDE 20 MG/ML
10 INJECTION INTRAMUSCULAR; INTRAVENOUS EVERY 4 HOURS PRN
Status: DISCONTINUED | OUTPATIENT
Start: 2020-02-21 | End: 2020-02-21

## 2020-02-21 RX ORDER — ONDANSETRON 2 MG/ML
4 INJECTION INTRAMUSCULAR; INTRAVENOUS EVERY 6 HOURS PRN
Status: CANCELLED | OUTPATIENT
Start: 2020-02-21

## 2020-02-21 RX ORDER — NIFEDIPINE 60 MG/1
60 TABLET, EXTENDED RELEASE ORAL DAILY
Status: DISCONTINUED | OUTPATIENT
Start: 2020-02-22 | End: 2020-02-22

## 2020-02-21 RX ORDER — HYDRALAZINE HYDROCHLORIDE 50 MG/1
50 TABLET, FILM COATED ORAL 3 TIMES DAILY
Status: DISCONTINUED | OUTPATIENT
Start: 2020-02-21 | End: 2020-02-21

## 2020-02-21 RX ORDER — ACETAMINOPHEN 160 MG/5ML
650 SUSPENSION, ORAL (FINAL DOSE FORM) ORAL EVERY 6 HOURS PRN
Status: CANCELLED | OUTPATIENT
Start: 2020-02-21

## 2020-02-21 RX ORDER — DEXTROSE MONOHYDRATE 25 G/50ML
25 INJECTION, SOLUTION INTRAVENOUS ONCE
Status: COMPLETED | OUTPATIENT
Start: 2020-02-21 | End: 2020-02-21

## 2020-02-21 RX ORDER — GENTAMICIN SULFATE 1 MG/G
1 OINTMENT TOPICAL DAILY
Status: CANCELLED | OUTPATIENT
Start: 2020-02-22

## 2020-02-21 RX ORDER — HEPARIN SODIUM 5000 [USP'U]/ML
5000 INJECTION, SOLUTION INTRAVENOUS; SUBCUTANEOUS EVERY 8 HOURS SCHEDULED
Status: DISCONTINUED | OUTPATIENT
Start: 2020-02-21 | End: 2020-02-28 | Stop reason: HOSPADM

## 2020-02-21 RX ORDER — LABETALOL 20 MG/4 ML (5 MG/ML) INTRAVENOUS SYRINGE
10 EVERY 6 HOURS PRN
Status: CANCELLED | OUTPATIENT
Start: 2020-02-21

## 2020-02-21 RX ORDER — ONDANSETRON 2 MG/ML
4 INJECTION INTRAMUSCULAR; INTRAVENOUS EVERY 6 HOURS PRN
Status: DISCONTINUED | OUTPATIENT
Start: 2020-02-21 | End: 2020-02-22

## 2020-02-21 RX ORDER — FENTANYL CITRATE 50 UG/ML
50 INJECTION, SOLUTION INTRAMUSCULAR; INTRAVENOUS ONCE
Status: COMPLETED | OUTPATIENT
Start: 2020-02-21 | End: 2020-02-21

## 2020-02-21 RX ORDER — LABETALOL 100 MG/1
150 TABLET, FILM COATED ORAL 2 TIMES DAILY
Status: CANCELLED | OUTPATIENT
Start: 2020-02-21

## 2020-02-21 RX ORDER — DEXTROSE MONOHYDRATE 25 G/50ML
INJECTION, SOLUTION INTRAVENOUS
Status: COMPLETED
Start: 2020-02-21 | End: 2020-02-21

## 2020-02-21 RX ORDER — GENTAMICIN SULFATE 1 MG/G
1 OINTMENT TOPICAL DAILY
Status: DISCONTINUED | OUTPATIENT
Start: 2020-02-22 | End: 2020-02-28 | Stop reason: HOSPADM

## 2020-02-21 RX ORDER — GABAPENTIN 100 MG/1
100 CAPSULE ORAL
Status: DISCONTINUED | OUTPATIENT
Start: 2020-02-21 | End: 2020-02-28 | Stop reason: HOSPADM

## 2020-02-21 RX ORDER — LORAZEPAM 2 MG/ML
INJECTION INTRAMUSCULAR
Status: DISCONTINUED
Start: 2020-02-21 | End: 2020-02-21 | Stop reason: HOSPADM

## 2020-02-21 RX ORDER — CLONIDINE HYDROCHLORIDE 0.1 MG/1
0.1 TABLET ORAL 3 TIMES DAILY
Status: CANCELLED | OUTPATIENT
Start: 2020-02-21

## 2020-02-21 RX ORDER — HYDRALAZINE HYDROCHLORIDE 20 MG/ML
10 INJECTION INTRAMUSCULAR; INTRAVENOUS EVERY 4 HOURS PRN
Status: CANCELLED | OUTPATIENT
Start: 2020-02-21

## 2020-02-21 RX ORDER — CINACALCET 30 MG/1
90 TABLET, FILM COATED ORAL DAILY
Status: DISCONTINUED | OUTPATIENT
Start: 2020-02-22 | End: 2020-02-28 | Stop reason: HOSPADM

## 2020-02-21 RX ORDER — ATORVASTATIN CALCIUM 40 MG/1
40 TABLET, FILM COATED ORAL
Status: CANCELLED | OUTPATIENT
Start: 2020-02-21

## 2020-02-21 RX ORDER — CHLORHEXIDINE GLUCONATE 0.12 MG/ML
15 RINSE ORAL EVERY 12 HOURS SCHEDULED
Status: DISCONTINUED | OUTPATIENT
Start: 2020-02-21 | End: 2020-02-21 | Stop reason: HOSPADM

## 2020-02-21 RX ORDER — MELATONIN
2000 DAILY
Status: CANCELLED | OUTPATIENT
Start: 2020-02-22

## 2020-02-21 RX ORDER — HYDRALAZINE HYDROCHLORIDE 20 MG/ML
10 INJECTION INTRAMUSCULAR; INTRAVENOUS EVERY 4 HOURS PRN
Status: DISCONTINUED | OUTPATIENT
Start: 2020-02-21 | End: 2020-02-21 | Stop reason: HOSPADM

## 2020-02-21 RX ORDER — CHLORHEXIDINE GLUCONATE 0.12 MG/ML
15 RINSE ORAL EVERY 12 HOURS SCHEDULED
Status: DISCONTINUED | OUTPATIENT
Start: 2020-02-21 | End: 2020-02-28 | Stop reason: HOSPADM

## 2020-02-21 RX ORDER — LABETALOL 20 MG/4 ML (5 MG/ML) INTRAVENOUS SYRINGE
10 EVERY 6 HOURS PRN
Status: DISCONTINUED | OUTPATIENT
Start: 2020-02-21 | End: 2020-02-21

## 2020-02-21 RX ORDER — TORSEMIDE 20 MG/1
100 TABLET ORAL
Status: DISCONTINUED | OUTPATIENT
Start: 2020-02-21 | End: 2020-02-21

## 2020-02-21 RX ORDER — LACTOBACILLUS ACIDOPHILUS / LACTOBACILLUS BULGARICUS 100 MILLION CFU STRENGTH
1 GRANULES ORAL
Status: CANCELLED | OUTPATIENT
Start: 2020-02-21

## 2020-02-21 RX ORDER — PROPOFOL 10 MG/ML
INJECTION, EMULSION INTRAVENOUS
Status: COMPLETED
Start: 2020-02-21 | End: 2020-02-21

## 2020-02-21 RX ORDER — CINACALCET 30 MG/1
90 TABLET, FILM COATED ORAL DAILY
Status: CANCELLED | OUTPATIENT
Start: 2020-02-22

## 2020-02-21 RX ORDER — FAMOTIDINE 20 MG/1
20 TABLET, FILM COATED ORAL
Status: DISCONTINUED | OUTPATIENT
Start: 2020-02-21 | End: 2020-02-26

## 2020-02-21 RX ORDER — TORSEMIDE 20 MG/1
100 TABLET ORAL
Status: DISCONTINUED | OUTPATIENT
Start: 2020-02-22 | End: 2020-02-28 | Stop reason: HOSPADM

## 2020-02-21 RX ORDER — LISINOPRIL 20 MG/1
40 TABLET ORAL DAILY
Status: DISCONTINUED | OUTPATIENT
Start: 2020-02-22 | End: 2020-02-22

## 2020-02-21 RX ORDER — ASPIRIN 81 MG/1
81 TABLET, CHEWABLE ORAL DAILY
Status: CANCELLED | OUTPATIENT
Start: 2020-02-22

## 2020-02-21 RX ORDER — LISINOPRIL 20 MG/1
40 TABLET ORAL DAILY
Status: CANCELLED | OUTPATIENT
Start: 2020-02-22

## 2020-02-21 RX ORDER — LACTOBACILLUS ACIDOPHILUS / LACTOBACILLUS BULGARICUS 100 MILLION CFU STRENGTH
1 GRANULES ORAL
Status: DISCONTINUED | OUTPATIENT
Start: 2020-02-21 | End: 2020-02-28 | Stop reason: HOSPADM

## 2020-02-21 RX ORDER — DEXTROSE 10 % IN WATER 10 %
150 INTRAVENOUS SOLUTION INTRAVENOUS ONCE
Status: COMPLETED | OUTPATIENT
Start: 2020-02-21 | End: 2020-02-21

## 2020-02-21 RX ORDER — TORSEMIDE 100 MG/1
100 TABLET ORAL
Status: CANCELLED | OUTPATIENT
Start: 2020-02-21

## 2020-02-21 RX ORDER — LABETALOL 20 MG/4 ML (5 MG/ML) INTRAVENOUS SYRINGE
Status: COMPLETED
Start: 2020-02-21 | End: 2020-02-21

## 2020-02-21 RX ORDER — METOCLOPRAMIDE HYDROCHLORIDE 5 MG/ML
10 INJECTION INTRAMUSCULAR; INTRAVENOUS EVERY 6 HOURS PRN
Status: DISCONTINUED | OUTPATIENT
Start: 2020-02-21 | End: 2020-02-21

## 2020-02-21 RX ORDER — DEXTROSE MONOHYDRATE 50 MG/ML
75 INJECTION, SOLUTION INTRAVENOUS CONTINUOUS
Status: DISCONTINUED | OUTPATIENT
Start: 2020-02-21 | End: 2020-02-21

## 2020-02-21 RX ORDER — HEPARIN SODIUM 5000 [USP'U]/ML
5000 INJECTION, SOLUTION INTRAVENOUS; SUBCUTANEOUS EVERY 8 HOURS SCHEDULED
Status: CANCELLED | OUTPATIENT
Start: 2020-02-21

## 2020-02-21 RX ORDER — FENTANYL CITRATE-0.9 % NACL/PF 10 MCG/ML
25 PLASTIC BAG, INJECTION (ML) INTRAVENOUS CONTINUOUS
Status: DISCONTINUED | OUTPATIENT
Start: 2020-02-21 | End: 2020-02-22

## 2020-02-21 RX ORDER — MIDAZOLAM HYDROCHLORIDE 2 MG/2ML
4 INJECTION, SOLUTION INTRAMUSCULAR; INTRAVENOUS ONCE
Status: COMPLETED | OUTPATIENT
Start: 2020-02-21 | End: 2020-02-21

## 2020-02-21 RX ORDER — CHLORHEXIDINE GLUCONATE 0.12 MG/ML
15 RINSE ORAL EVERY 12 HOURS SCHEDULED
Status: CANCELLED | OUTPATIENT
Start: 2020-02-21

## 2020-02-21 RX ORDER — ASPIRIN 81 MG/1
81 TABLET, CHEWABLE ORAL DAILY
Status: DISCONTINUED | OUTPATIENT
Start: 2020-02-21 | End: 2020-02-28 | Stop reason: HOSPADM

## 2020-02-21 RX ORDER — LABETALOL 20 MG/4 ML (5 MG/ML) INTRAVENOUS SYRINGE
10 EVERY 2 HOUR PRN
Status: DISCONTINUED | OUTPATIENT
Start: 2020-02-21 | End: 2020-02-21

## 2020-02-21 RX ORDER — HYDRALAZINE HYDROCHLORIDE 25 MG/1
50 TABLET, FILM COATED ORAL 3 TIMES DAILY
Status: CANCELLED | OUTPATIENT
Start: 2020-02-21

## 2020-02-21 RX ORDER — LISINOPRIL 20 MG/1
40 TABLET ORAL DAILY
Status: DISCONTINUED | OUTPATIENT
Start: 2020-02-22 | End: 2020-02-21

## 2020-02-21 RX ORDER — CLONIDINE HYDROCHLORIDE 0.1 MG/1
0.1 TABLET ORAL 3 TIMES DAILY
Status: DISCONTINUED | OUTPATIENT
Start: 2020-02-21 | End: 2020-02-22

## 2020-02-21 RX ORDER — LABETALOL 20 MG/4 ML (5 MG/ML) INTRAVENOUS SYRINGE
20 EVERY 2 HOUR PRN
Status: DISCONTINUED | OUTPATIENT
Start: 2020-02-21 | End: 2020-02-22

## 2020-02-21 RX ORDER — NIFEDIPINE 10 MG/1
20 CAPSULE ORAL EVERY 8 HOURS SCHEDULED
Status: DISCONTINUED | OUTPATIENT
Start: 2020-02-21 | End: 2020-02-21

## 2020-02-21 RX ORDER — PROPOFOL 10 MG/ML
5-50 INJECTION, EMULSION INTRAVENOUS
Status: DISCONTINUED | OUTPATIENT
Start: 2020-02-21 | End: 2020-02-22

## 2020-02-21 RX ORDER — HYDRALAZINE HYDROCHLORIDE 50 MG/1
50 TABLET, FILM COATED ORAL 3 TIMES DAILY
Status: DISCONTINUED | OUTPATIENT
Start: 2020-02-21 | End: 2020-02-28 | Stop reason: HOSPADM

## 2020-02-21 RX ORDER — NIFEDIPINE 10 MG/1
20 CAPSULE ORAL EVERY 8 HOURS SCHEDULED
Status: CANCELLED | OUTPATIENT
Start: 2020-02-21

## 2020-02-21 RX ADMIN — DEXTROSE MONOHYDRATE 75 ML/HR: 50 INJECTION, SOLUTION INTRAVENOUS at 20:12

## 2020-02-21 RX ADMIN — HEPARIN SODIUM 5000 UNITS: 5000 INJECTION INTRAVENOUS; SUBCUTANEOUS at 05:09

## 2020-02-21 RX ADMIN — HEPARIN SODIUM 5000 UNITS: 5000 INJECTION INTRAVENOUS; SUBCUTANEOUS at 13:53

## 2020-02-21 RX ADMIN — FENTANYL CITRATE 50 MCG/HR: 50 INJECTION, SOLUTION INTRAMUSCULAR; INTRAVENOUS at 09:35

## 2020-02-21 RX ADMIN — VALPROATE SODIUM 1500 MG: 100 INJECTION, SOLUTION INTRAVENOUS at 14:25

## 2020-02-21 RX ADMIN — METOCLOPRAMIDE HYDROCHLORIDE 10 MG: 5 INJECTION INTRAMUSCULAR; INTRAVENOUS at 04:13

## 2020-02-21 RX ADMIN — ASPIRIN 81 MG 81 MG: 81 TABLET ORAL at 13:53

## 2020-02-21 RX ADMIN — DEXTROSE 50 % IN WATER (D50W) INTRAVENOUS SYRINGE 25 ML: at 19:46

## 2020-02-21 RX ADMIN — DEXTROSE 50 % IN WATER (D50W) INTRAVENOUS SYRINGE 25 ML: at 17:15

## 2020-02-21 RX ADMIN — DEXTROSE 50 % IN WATER (D50W) INTRAVENOUS SYRINGE 25 ML: at 15:41

## 2020-02-21 RX ADMIN — HYDRALAZINE HYDROCHLORIDE 10 MG: 20 INJECTION INTRAMUSCULAR; INTRAVENOUS at 05:09

## 2020-02-21 RX ADMIN — DEXTROSE 150 ML: 10 SOLUTION INTRAVENOUS at 22:30

## 2020-02-21 RX ADMIN — LABETALOL HYDROCHLORIDE 10 MG: 5 INJECTION INTRAVENOUS at 11:06

## 2020-02-21 RX ADMIN — FENTANYL CITRATE 50 MCG: 50 INJECTION, SOLUTION INTRAMUSCULAR; INTRAVENOUS at 09:12

## 2020-02-21 RX ADMIN — LABETALOL 20 MG/4 ML (5 MG/ML) INTRAVENOUS SYRINGE 10 MG: at 01:31

## 2020-02-21 RX ADMIN — PROPOFOL 40 MCG/KG/MIN: 10 INJECTION, EMULSION INTRAVENOUS at 20:18

## 2020-02-21 RX ADMIN — MIDAZOLAM 4 MG: 1 INJECTION INTRAMUSCULAR; INTRAVENOUS at 09:13

## 2020-02-21 RX ADMIN — ONDANSETRON 4 MG: 2 INJECTION INTRAMUSCULAR; INTRAVENOUS at 22:50

## 2020-02-21 RX ADMIN — Medication 50 MCG/HR: at 11:33

## 2020-02-21 RX ADMIN — SODIUM CHLORIDE 50 ML/HR: 234 INJECTION INTRAMUSCULAR; INTRAVENOUS; SUBCUTANEOUS at 23:17

## 2020-02-21 RX ADMIN — DEXTROSE 50 % IN WATER (D50W) INTRAVENOUS SYRINGE 25 ML: at 14:00

## 2020-02-21 RX ADMIN — LABETALOL 20 MG/4 ML (5 MG/ML) INTRAVENOUS SYRINGE 10 MG: at 11:06

## 2020-02-21 RX ADMIN — DEXTROSE MONOHYDRATE 25 ML: 25 INJECTION, SOLUTION INTRAVENOUS at 14:55

## 2020-02-21 RX ADMIN — PROPOFOL 50 MCG/KG/MIN: 10 INJECTION, EMULSION INTRAVENOUS at 11:15

## 2020-02-21 RX ADMIN — VALPROATE SODIUM 750 MG: 100 INJECTION, SOLUTION INTRAVENOUS at 19:56

## 2020-02-21 RX ADMIN — SODIUM CHLORIDE 2.5 MG/HR: 0.9 INJECTION, SOLUTION INTRAVENOUS at 12:00

## 2020-02-21 RX ADMIN — PROPOFOL 25 MCG/KG/MIN: 10 INJECTION, EMULSION INTRAVENOUS at 14:19

## 2020-02-21 RX ADMIN — MIDAZOLAM 2 MG/HR: 5 INJECTION INTRAMUSCULAR; INTRAVENOUS at 09:35

## 2020-02-21 RX ADMIN — HYDRALAZINE HYDROCHLORIDE 10 MG: 20 INJECTION INTRAMUSCULAR; INTRAVENOUS at 18:51

## 2020-02-21 RX ADMIN — LABETALOL 20 MG/4 ML (5 MG/ML) INTRAVENOUS SYRINGE 20 MG: at 23:57

## 2020-02-21 RX ADMIN — LABETALOL HYDROCHLORIDE 10 MG: 5 INJECTION INTRAVENOUS at 22:34

## 2020-02-21 RX ADMIN — HEPARIN SODIUM 5000 UNITS: 5000 INJECTION INTRAVENOUS; SUBCUTANEOUS at 22:56

## 2020-02-21 RX ADMIN — PROPOFOL 50 MCG: 10 INJECTION, EMULSION INTRAVENOUS at 09:14

## 2020-02-21 RX ADMIN — DEXTROSE MONOHYDRATE 25 ML: 25 INJECTION, SOLUTION INTRAVENOUS at 14:00

## 2020-02-21 RX ADMIN — DEXTROSE MONOHYDRATE 25 ML: 25 INJECTION, SOLUTION INTRAVENOUS at 15:41

## 2020-02-21 RX ADMIN — VALPROATE SODIUM 2000 MG: 100 INJECTION, SOLUTION INTRAVENOUS at 08:45

## 2020-02-21 NOTE — H&P
H&P Exam - 255 Steven Mckeon 37 y o  male MRN: [id number]  Unit/Bed#: ICU 02 Encounter: [id number]    -------------------------------------------------------------------------------------------------------------  Chief Complaint: seizures     History of Present Illness   HX and PE limited by: sedation  Bijan Bella is a 37 y o  male with type 1 insulin dependent diabetes complicated with nephropathy, gastroparesis, retinopathy and neuropathy, history of CVA x 2, ESRD on PD 2/2 T1DM, hypertension, dyslipidemia, patient is on the pancreas/renal transplant list at [facility] who presented to [facility] on 2/20/2020 with vertigo and lightheadedness for 2 days intermittently  He also had word finding difficulty but no slurred speech, occipital headache, and nausea  He noted that he felt he was twitching more than normal  The following day, patient was found to have seizures  He received 6 mg of Ativan, loaded with depacon 20 mg/kg and started on 5 mg/kg of depacon q8h    Patient was successfully intubated and transferred to [facility] for continuous EEG monitoring     History obtained from chart review    -------------------------------------------------------------------------------------------------------------  Assessment and Plan:    Neuro:   · Sedation plan: Fentanyl and Propofol  · RASS goal: -2 Light Sedation  · Pain controlled with: Fentanyl  · Pain score: none  · Regulate sleep/wake cycle  · Diagnosis: Status epilepticus 2/2 unknown etiology   · Plan:  · Continue propofol and versed infusino  · Depacon level pending     CV:   · Diagnosis: Hypertension   · Cardiac infusions: Cardene, 2 5 Units/min  · Rhythm: NSR  · Follow rhythm on telemetry    Pulm:   · Diagnosis: mechanical ventilation 2/2 airway protection in the setting of status epilepticus   · SBT plan:   · Chest PT ordered: yes   · Chlorhexidine ordered: yes   · HOB >30 degrees: yes     GI:   · Nutrition/diet plan: NPO  · Stress ulcer prophylaxis: Pepcid IV  · Bowel regimen: None currently    FEN:   · Nutrition: NPO   · Fluid/Diuretic plan: No intervention  · Electrolytes repleted: yes  · Goal: K >4 0, Mag >2 0, and Phos >3 0    :   · Diagnosis: ESRD on dialysis  · Nephro consult   · Monitor electrolytes and Cr with daily BMP   · Indwelling Hoffman present: yes   · Urinary catheter still needed for Strict I and O in a critically ill patient  ID:   · Diagnosis: No acute issues   · Elevated lactic acidosis likely due to seizure activity   · Trend temps and WBC count    Heme:   · Diagnosis: No acute disease   · Trend hgb and plts    Endo:   · Diagnosis: Type 1 DM on insulin   · Glycemic control plan: Blood glucose not controlled  Start insulin infusion  Msk/Skin:  · Mobility goal:   · PT consult: not applicable  · OT consult: not applicable  · Frequent turning and off-loading    Family:  · Family updated within 24 hours: yes   · Family meeting planned today: no       Disposition: Continue Critical Care   Code Status: Prior  --------------------------------------------------------------------------------------------------------------  Review of Systems   Unable to perform ROS: Patient unresponsive       A 12-point, complete review of systems was reviewed and negative except as stated above     Physical Exam   Constitutional: well developed ,well nourished   HENT: normocephalic and atraumatic   Eyes: no scleral icterus; pinpoint pupils   Pulm: clear to auscultation bilaterally; no distress  CV: distal pulses intact  No abnormal heart sounds  GI: soft, non-tender   MSK: no edema  Neuro: patient withdraws to pain in all four extremities  Does not follow commands   Does not open eyes to voice   --------------------------------------------------------------------------------------------------------------  Historical Information   Past Medical History:   Diagnosis Date    [facility] kidney [facility] [address] )     Anxiety     Cerebellar stroke side undetermined 2015 2015,1/2018  Diabetes type 1, controlled ([facility] [address] )     IDDM    Diarrhea     Gastroparesis     History of shingles 2010    History of transfusion 2018    Hypertension     Muscle weakness     general unsteadiness    Retinopathy      Past Surgical History:   Procedure Laterality Date    CARDIAC LOOP RECORDER  2018    EGD      EYE SURGERY      PERITONEAL CATHETER INSERTION N/A 2018    Procedure: UNROOF PD CATHETER;  Surgeon: Poonam Carrero DO;  Location: [facility] Main OR;  Service: General    DC ESOPHAGOGASTRODUODENOSCOPY TRANSORAL DIAGNOSTIC N/A 2019    Procedure: ESOPHAGOGASTRODUODENOSCOPY (EGD); Surgeon: Kirit Sarkar MD;  Location: [facility] GI LAB;   Service: Gastroenterology    DC LAP INSERTION TUNNELED INTRAPERITONEAL CATHETER N/A 2018    Procedure: LAPAROSCOPIC PD CATHETER PLACEMENT;  Surgeon: Poonam Carrero DO;  Location: [facility];  Service: General    TONSILLECTOMY       Social History   Social History     Substance and Sexual Activity   Alcohol Use Not Currently    Comment: rarely     Social History     Substance and Sexual Activity   Drug Use Yes    Frequency: 5 0 times per week    Types: Marijuana    Comment: medical     Social History     Tobacco Use   Smoking Status Former Smoker    Packs/day: 0 50    Years: 12 00    Pack years: 6 00    Types: Cigarettes    Last attempt to quit: 2018    Years since quittin 0   Smokeless Tobacco Never Used   Tobacco Comment    quit 2018     Family History:   Family History   Problem Relation Age of Onset    Breast cancer Mother     Hypertension Mother     Hyperlipidemia Father     Hypertension Father     Leukemia Maternal Grandmother     Hyperlipidemia Maternal Grandfather     Hypertension Maternal Grandfather     Hyperlipidemia Paternal Grandmother     Hypertension Paternal Grandmother     Heart disease Paternal Grandfather         cardiac disorder    Diabetes Paternal Grandfather      I have reviewed this patient's family history and commented on sigificant items within the HPI    Vitals: There were no vitals filed for this visit  Temp  Min: 97 9 °F (36 6 °C)  Max: 99 1 °F (37 3 °C)        There is no height or weight on file to calculate BMI  N/A    Medications:  No current facility-administered medications for this encounter  Home medications:  Prior to Admission Medications   Prescriptions Last Dose Informant Patient Reported? Taking? ADMELOG SOLOSTAR 100 units/mL injection pen   No No   Sig: INJECT 10 UNITS WITH A SLIDING SCALE OF 1 UNIT FOR EVERY 25 OVER 150 SUBCUTANEOUSLY THREE TIMES DAILY WITH MEALS   Patient taking differently: 5 Units 3 (three) times a day with meals    B-D ULTRAFINE III SHORT PEN 31G X 8 MM MISC   No No   Sig: USE 1 PEN NEEDLE 8 TIMES DAILY   Cholecalciferol (VITAMIN D) 2000 units CAPS  Mother Yes No   Sig: Take 1 capsule by mouth daily   GLUCAGON EMERGENCY 1 MG injection   Yes No   Sig: INJECT 1MG SUBCUTANEOUSLY AS NEEDED (AS DIRECTED)   MAY REPEAT DOSE EVERY 20 MINUTES AS NEEDED   NIFEdipine ER (ADALAT CC) 60 MG 24 hr tablet  Mother No No   Sig: Take 1 tablet (60 mg total) by mouth daily   Probiotic Product (PROBIOTIC DAILY) CAPS  Self Yes No   Sig: Take 1 capsule by mouth daily at bedtime    Sucroferric Oxyhydroxide (VELPHORO PO)   Yes No   Si mg 3 (three) times a day with meals    aspirin 81 mg chewable tablet  Mother Yes No   Sig: Chew 81 mg daily   atorvastatin (LIPITOR) 40 mg tablet   No No   Sig: Take 1 tablet (40 mg total) by mouth every evening   Patient taking differently: Take 40 mg by mouth daily at bedtime    b complex vitamins capsule  Mother Yes No   Sig: Take 1 capsule by mouth daily   calcium acetate (PHOSLO) 667 mg capsule  Mother No No   Sig: Take 1 capsule (667 mg total) by mouth 3 (three) times a day with meals   cinacalcet (SENSIPAR) 90 MG tablet   Yes No   Sig: Take 90 mg by mouth daily   cloNIDine (CATAPRES) 0 1 mg tablet   Yes No   Sig: Take 0 1 mg by mouth 3 (three) times a day May add another 0 1 as needed   escitalopram (LEXAPRO) 10 mg tablet   No No   Sig: Take 1 tablet (10 mg total) by mouth daily   famotidine (PEPCID) 20 mg tablet   No No   Sig: Take 1 tablet (20 mg total) by mouth daily at bedtime   Patient taking differently: Take 40 mg by mouth daily at bedtime    gabapentin (NEURONTIN) 100 mg capsule   Yes No   Sig: Take 100 mg by mouth daily at bedtime   gentamicin (GARAMYCIN) 0 1 % cream   Yes No   Sig: Apply 1 application topically daily    hydrALAZINE (APRESOLINE) 100 MG tablet  Self No No   Sig: Take 0 5 tablets (50 mg total) by mouth 3 (three) times a day   Patient taking differently: Take 50 mg by mouth 3 (three) times a day 100 mg twice daily   hydrOXYzine HCL (ATARAX) 10 mg tablet   No No   Sig: Take 1 tablet (10 mg total) by mouth every 6 (six) hours as needed for itching   Patient taking differently: Take 10 mg by mouth 2 (two) times a day    insulin glargine (BASAGLAR KWIKPEN) 100 units/mL injection pen   No No   Sig: Inject 12u in AM and 15u in PM   Patient taking differently: 10 Units every 12 (twelve) hours Inject 10units AM, 14units at night with sliding scale   labetalol (NORMODYNE) 300 mg tablet  Mother No No   Sig: Take 0 5 tablets (150 mg total) by mouth every 8 (eight) hours   Patient taking differently: Take 150 mg by mouth 2 (two) times a day    lisinopril (ZESTRIL) 40 mg tablet  Mother Yes No   Sig: Take 40 mg by mouth daily   ondansetron (ZOFRAN) 4 mg tablet   No No   Sig: Take 1 tablet (4 mg total) by mouth every 8 (eight) hours as needed for nausea or vomiting   Patient taking differently: Take 4 mg by mouth every 8 (eight) hours as needed for nausea or vomiting    torsemide (DEMADEX) 100 mg tablet   Yes No   Sig: Take 100 mg by mouth daily with lunch       Facility-Administered Medications: None     Allergies:   Allergies   Allergen Reactions    Sulfa Antibiotics Rash         Laboratory and Diagnostics:  Results from last 7 days Lab Units 02/21/20  0945 02/21/20  0850 02/21/20  0429 02/20/20  1804   WBC Thousand/uL 7 82  --  9 21 7 85   HEMOGLOBIN g/dL 9 8*  --  11 1* 10 4*   I STAT HEMOGLOBIN g/dl  --  10 2*  --   --    HEMATOCRIT % 29 0*  --  34 1* 32 6*   HEMATOCRIT, ISTAT %  --  30*  --   --    PLATELETS Thousands/uL 261  --  271 235   NEUTROS PCT %  --   --   --  60   MONOS PCT %  --   --   --  8     Results from last 7 days   Lab Units 02/21/20  0945 02/21/20  0850 02/21/20  0429 02/20/20  1804   SODIUM mmol/L 142  --  143 143   POTASSIUM mmol/L 5 4*  --  4 6 5 6*   CHLORIDE mmol/L 100  --  101 101   CO2 mmol/L 24  --  27 29   CO2, I-STAT mmol/L  --  24  --   --    ANION GAP mmol/L 18*  --  15* 13   BUN mg/dL 50*  --  52* 53*   CREATININE mg/dL 15 82*  --  15 44* 15 32*   CALCIUM mg/dL 9 0  --  9 0 8 7   GLUCOSE RANDOM mg/dL 255*  --  212* 160*   ALT U/L  --   --   --  22   AST U/L  --   --   --  20   ALK PHOS U/L  --   --   --  77   ALBUMIN g/dL  --   --   --  3 3*   TOTAL BILIRUBIN mg/dL  --   --   --  0 43     Results from last 7 days   Lab Units 02/21/20  0945 02/20/20  1804   MAGNESIUM mg/dL 2 8* 2 5      Results from last 7 days   Lab Units 02/20/20  1804   INR  1 02   PTT seconds 33              ABG:    VBG:          Micro:          EKG: NSR   Imaging: I have personally reviewed pertinent films in PACS    ------------------------------------------------------------------------------------------------------------  Advance Directive and Living Will:      Power of :    POLST:    ------------------------------------------------------------------------------------------------------------  Anticipated Length of Stay is > 2 midnights    Mohamud Mena MD        Portions of the record may have been created with voice recognition software  Occasional wrong word or "sound a like" substitutions may have occurred due to the inherent limitations of voice recognition software    Read the chart carefully and recognize, using context, where substitutions have occurred

## 2020-02-21 NOTE — ASSESSMENT & PLAN NOTE
· Patient hypertensive on presentation to the hospital with systolic blood pressure in 180s  · Patient has not taken his evening BP meds yet  · Continue home meds  · Labetalol 10 mg IV p r n   As needed for systolic blood pressure greater than 160  · Continue to monitor blood pressure closely

## 2020-02-21 NOTE — PROGRESS NOTES
Meds wasted from Pep transfer: Breanna Arias and writer present for waste  Versed gtt 80ml  Double concentrated fentanyl gtt 90ml  Rinsed down the sink

## 2020-02-21 NOTE — CONSULTS
2           Consultation - Nephrology   Dylan Fernando Mckeon 37 y o  male MRN: [id number]  Unit/Bed#: ICU 05 Encounter: [id number]      Assessment/Plan     Assessment / Plan:  1  End-stage renal disease    The patient is end-stage renal disease is on nocturnal cycler peritoneal dialysis  He was admitted for some dizziness and suffered a grand mal seizure this morning and has been intubated in ICU  Overnight he did undergo peritoneal dialysis as my partner had written orders  Labs from this morning revealed acceptable electrolytes and hemoglobin is at target range of 11  Will continue once arrives at [facility]  I will alert my partner that the patient will be transferred  Patient's blood pressure is very elevated he does have some medications ordered he has just arrived in ICU after his seizure will be monitored on transfer  Continue nocturnal cycler peritoneal dialysis as ordered  2  Neurological     The patient has a history of demyelinating disease and is followed by Neurology  He was admitted for dizziness potentially vertigo symptoms and he suffered a seizure this morning and is presently intubated in the ICU  According to the ICU team patient is being transferred to [facility] for continuous EEG monitoring  History of Present Illness   Physician Requesting Consult: Keli York DO  Reason for Consult / Principal Problem:  End-stage renal disease  Hx and PE limited by:   HPI: Skip Medina is a 37y o  year old male who is presently unable to provide history for me as he is intubated and sedated in ICU  Per review of the chart the patient was admitted to the hospital complaining of dizziness the room spinning feeling lightheaded that was going on and off for 2 days  He presented to the hospital overnight suffered a seizure this morning is presently intubated in the ICU on sedation  He did undergo peritoneal dialysis last night    History obtained from chart review and speaking with the ICU team     Consults    Review of Systems  Patient is presently sedated intubated unable to provide any review of system for me  Historical Information   Patient Active Problem List   Diagnosis    Type [facility] diabetes mellitus with hypoglycemia ([facility]    History of lacunar cerebrovascular accident (CVA)    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,      Binocular visual disturbance    Vitamin D deficiency    Homozygous MTHFR mutation [id number] ([facility] )    Hyperkalemia    End-stage renal disease on peritoneal dialysis ([facility] )    Persistent proteinuria    Bilateral leg edema    Ataxia    Left atrial dilation    Renovascular hypertension    Anemia    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 ([facility] )    Peritoneal dialysis catheter in place ([place] [address] )    Dyslipidemia    Strabismus    Diarrhea    Current moderate episode of major depressive disorder without prior episode ([place] [address] )    Environmental and seasonal allergies    Pruritus    Obesity (BMI 30 0-34  9)    Gastroparesis diabeticorum (HCC)    Peripheral polyneuropathy    Elevated TSH    Incidental lung nodule, > 3mm and < 8mm    Sepsis ([place] [address] )    Ambulatory dysfunction    Vertigo     Past Medical History:   Diagnosis Date    Acute kidney injury ([place] [address] )     Anxiety     Cerebellar stroke side undetermined 2015 2015,1/2018    Diabetes [facility]     IDDM    Diarrhea     Gastroparesis     History of shingles 2010    History of transfusion 02/2018    Hypertension     Muscle weakness     general unsteadiness    Retinopathy      Past Surgical History:   Procedure Laterality Date    CARDIAC LOOP RECORDER  05/2018    EGD      EYE SURGERY      PERITONEAL CATHETER INSERTION N/A 8/27/2018    Procedure: UNROOF PD CATHETER;  Surgeon: Darrel Koyanagi, DO;  [facility];  Service: General    NC ESOPHAGOGASTRODUODENOSCOPY TRANSORAL DIAGNOSTIC N/A 4/18/2019    Procedure: ESOPHAGOGASTRODUODENOSCOPY (EGD); Surgeon: Meaghan Goff MD;  Location: [facility];   Service: Gastroenterology    FL LAP INSERTION TUNNELED INTRAPERITONEAL CATHETER N/A 2018    Procedure: LAPAROSCOPIC PD CATHETER PLACEMENT;  Surgeon: Cece Olmstead DO;  [facility];  Service: General    TONSILLECTOMY       Social History   Social History     Substance and Sexual Activity   Alcohol Use Not Currently    Comment: rarely     Social History     Substance and Sexual Activity   Drug Use Yes    Frequency: 5 0 times per week    Types: Marijuana    Comment: medical     Social History     Tobacco Use   Smoking Status Former Smoker    Packs/day: 0 50    Years: 12 00    Pack years: 6 00    Types: Cigarettes    Last attempt to quit: 2018    Years since quittin 0   Smokeless Tobacco Never Used   Tobacco Comment    quit 2018     Family History   Problem Relation Age of Onset    Breast cancer Mother     Hypertension Mother     Hyperlipidemia Father     Hypertension Father     Leukemia Maternal Grandmother     Hyperlipidemia Maternal Grandfather     Hypertension Maternal Grandfather     Hyperlipidemia Paternal Grandmother     Hypertension Paternal Grandmother     Heart disease Paternal Grandfather         cardiac disorder    Diabetes Paternal Grandfather        Meds/Allergies   current meds:   Current Facility-Administered Medications   Medication Dose Route Frequency    acetaminophen (TYLENOL) tablet 650 mg  650 mg Oral Q6H PRN    aspirin chewable tablet 81 mg  81 mg Oral Daily    atorvastatin (LIPITOR) tablet 40 mg  40 mg Oral HS    calcium acetate (PHOSLO) capsule 667 mg  667 mg Oral TID With Meals    chlorhexidine (PERIDEX) 0 12 % oral rinse 15 mL  15 mL Swish & Spit Q12H [facility]    cholecalciferol (VITAMIN D3) tablet 2,000 Units  2,000 Units Oral Daily    cinacalcet (SENSIPAR) tablet 90 mg  90 mg Oral Daily    cloNIDine (CATAPRES) tablet 0 1 mg  0 1 mg Oral TID    escitalopram (LEXAPRO) tablet 10 mg  10 mg Oral Daily    famotidine (PEPCID) tablet 20 mg  20 mg Oral HS    fentaNYL 20 mcg/mL (DOUBLE CONCENTRATED) infusion in sodium chloride 0 9% 100 mL  50 mcg/hr Intravenous Continuous    gabapentin (NEURONTIN) capsule 100 mg  100 mg Oral HS    gentamicin (GARAMYCIN) 0 1 % ointment 1 application  1 application Topical Daily    heparin (porcine) subcutaneous injection 5,000 Units  5,000 Units Subcutaneous Q8H [address]    hydrALAZINE (APRESOLINE) injection 10 mg  10 mg Intravenous Q4H PRN    hydrALAZINE (APRESOLINE) tablet 50 mg  50 mg Oral TID    insulin regular (HumuLIN R,NovoLIN R) 1 Units/mL in sodium chloride 0 9 % 100 mL infusion  0 3-21 Units/hr Intravenous Titrated    labetalol (NORMODYNE) tablet 150 mg  150 mg Oral BID    Labetalol HCl (NORMODYNE) injection 10 mg  10 mg Intravenous Q6H PRN    lactobacillus acidophilus-bulgaricus (FLORANEX) packet 1 packet  1 packet Oral HS    lisinopril (ZESTRIL) tablet 40 mg  40 mg Oral Daily    LORazepam (ATIVAN) 2 mg/mL injection **ADS Override Pull**        LORazepam (ATIVAN) 2 mg/mL injection **ADS Override Pull**        LORazepam (ATIVAN) 2 mg/mL injection **ADS Override Pull**        metoclopramide (REGLAN) injection 10 mg  10 mg Intravenous Q6H PRN    midazolam (VERSED) 1 mg/mL (DOUBLE CONCENTRATION) 100 mL infusion  2 mg/hr Intravenous Continuous    NIFEdipine (PROCARDIA) capsule 20 mg  20 mg Oral Q8H [address]    ondansetron (ZOFRAN) injection 4 mg  4 mg Intravenous Q6H PRN    torsemide (DEMADEX) tablet 100 mg  100 mg Oral Daily With Lunch    valproate (DEPACON) 2,000 mg in sodium chloride 0 9 % 50 mL BOLUS  20 mg/kg Intravenous Once     Allergies   Allergen Reactions    Sulfa Antibiotics Rash       Objective   No intake or output data in the 24 hours ending 02/21/20 0926  Body mass index is 30 77 kg/m²      Invasive Devices:        PHYSICAL EXAM:  BP (!) 244/129 (BP Location: Left leg)   Pulse (!) 131   Temp 97 9 °F (36 6 °C) (Oral)   Resp 22   Ht 5' 11" (1 803 m)   Wt 100 kg (220 lb 9 6 oz)   SpO2 99%   BMI 30 77 kg/m²     Physical Exam   Constitutional: No distress  Intubated  HENT:   Orally intubated   Neck: Neck supple  No JVD present  Cardiovascular: Normal rate and regular rhythm  Exam reveals no gallop and no friction rub  No significant edema  Pulmonary/Chest: Breath sounds normal  No respiratory distress  He has no wheezes  He has no rales  On ventilator  Abdominal: Soft  Bowel sounds are normal  He exhibits no distension  There is no tenderness  There is no rebound  Neurological:   Patient is unresponsive for me on ventilator  Unable to assess neurological exam    Skin: He is not diaphoretic           Current Weight: Weight - Scale: 100 kg (220 lb 9 6 oz)  First Weight: Weight - Scale: 101 kg (223 lb)    Lab Results:    Results from last 7 days   Lab Units 02/21/20  0850 02/21/20  0429   WBC Thousand/uL  --  9 21   HEMOGLOBIN g/dL  --  11 1*   I STAT HEMOGLOBIN g/dl 10 2*  --    HEMATOCRIT %  --  34 1*   HEMATOCRIT, ISTAT % 30*  --    PLATELETS Thousands/uL  --  271     Results from last 7 days   Lab Units 02/21/20  0850 02/21/20  0429   POTASSIUM mmol/L  --  4 6   CHLORIDE mmol/L  --  101   CO2 mmol/L  --  27   CO2, I-STAT mmol/L 24  --    BUN mg/dL  --  52*   CREATININE mg/dL  --  15 44*   GLUCOSE, ISTAT mg/dl 275*  --    CALCIUM mg/dL  --  9 0     Results from last 7 days   Lab Units 02/21/20  0850 02/21/20  0429 02/20/20  1804   POTASSIUM mmol/L  --  4 6 5 6*   CHLORIDE mmol/L  --  101 101   CO2 mmol/L  --  27 29   CO2, I-STAT mmol/L 24  --   --    BUN mg/dL  --  52* 53*   CREATININE mg/dL  --  15 44* 15 32*   CALCIUM mg/dL  --  9 0 8 7   ALK PHOS U/L  --   --  77   ALT U/L  --   --  22   AST U/L  --   --  20   GLUCOSE, ISTAT mg/dl 275*  --   --

## 2020-02-21 NOTE — SOCIAL WORK
Pt is intubated and sedated  CM met pt's mother Long Almanza and father Blanca  at bedside, introduce self and made aware of CM role at dc  Pt lives with his parents in a 2 story house with 1 NIRU and 13 steps to the 2nd flr bathroom and bedroom  Pt was IPTA with ADL's, does not drive and on disabillity  Pt's parents are retired and can can help pt at home if needed and provide transportation to his appts  Pt uses a SPC for outside the house ambulation  Pt has a RW, BSC and SC at home  Pt has no hx with HHc, alc, drug and IP psych tx  Pt has hx with OO for STR  Pt was dx with ESRD on home PD with Ritu Mancuso  Pt manage his home PD  PCP is Dr Brad Perez  Pharmacy is [facility] in [place] [address]  Pt has transportation when dc  CM reviewed d/c planning process including the following: identifying help at home, patient preference for d/c planning needs, Discharge Lounge, [facility] Meds to Bed program, availability of treatment team to discuss questions or concerns patient and/or family may have regarding understanding medications and recognizing signs and symptoms once discharged  CM also encouraged patient to follow up with all recommended appointments after discharge  Patient advised of importance for patient and family to participate in managing patients medical well being

## 2020-02-21 NOTE — PROGRESS NOTES
Critical Care Accept Note   Ketty Mckeon 37 y o  male MRN: [id number]  Unit/Bed#: S -01 Encounter: [id number]      Assessment:   1  Status Epilepticus  2  Acute respiratory failure secondary to #1  3  ESRD on PD  4  DM1 since age of 1 with multiple complications  5  HTN with hypertensive urgency/emergency    PLAN:  · NEURO - continue propofol and versed gtts, loaded depacon 20mg/kg, recheck level in 2-4 hours, rebolus per neurology, transfer to to [facility] for cEEG monitoring, neurology and epileptology aware  · CARDIAC - monitor BP goal SBP <180, may need prn bolus labetolol vs gtt to reach goal if not improving with seizure control and sedation, continue ASA/Statin, continue oral hypertensive regimen but hold procardia XL  · PULM - continue MV support for airway protection, ABG 7 36/41/>400 - wean FiO2 as tolerated, SBT once neuro status improves  · GI - NPO for now  · RENAL - held further PD was on dwell 4/5, further management per renal   · ENDO - start insulin gtt given variable intake status  · HEME - no active issues  · ID - no active issues, do not suspect infectious cause of seizures  · ICU Care - SCDs, UFH TID, CHG, HOB >30deg, H2 blockade  · Mother updated at bedside and all questions answered  Critical care time excluding procedures, teaching and updates is 65 minutes    Chief Compliant - Rapid response call  History of Present Illness   HPI:  Bijan Bella is a 37 y o  male who has multiple medical problems including DM1 with multiple complications (neuropathy, nephropathy, gastroparesis, retinopathy), ESRD on PD x 18 months - on pancrease/renal transplant list at [facility], HTN, CVA x 2 presented for periods of dizziness and "spells" of not speaking and inattentiveness per his mother  He was admitted to general medicine floor and undergoing neurology evaluation with CT head and MRI  This am rapid response called for patient with active seizure in bathroom    Witnessed at time of start and no head trauma reported  See rapid response note for details  He was undergoing PD at the time  BS was >200  Briefly patient with continued seizure activity after 10mg ativan and 4mg versed as well as valproate load  He was subsequently intubated and placed no propofol gtt and transferred to ICU  Noted HTN during and prior to event up to 240's/120's    Review of systems:  12 point review of systems was not able to be completed as patient intubated      Historical Information   Past Medical History:   Diagnosis Date    Acute kidney injury ([facility] [address] )     Anxiety     Cerebellar stroke side undetermined 2015 2015,1/2018    Diabetes type 1, controlled ([facility] [address] )     IDDM    Diarrhea     Gastroparesis     History of shingles 2010    History of transfusion 02/2018    Hypertension     Muscle weakness     general unsteadiness    Retinopathy      Past Surgical History:   Procedure Laterality Date    CARDIAC LOOP RECORDER  05/2018    EGD      EYE SURGERY      PERITONEAL CATHETER INSERTION N/A 8/27/2018    Procedure: UNROOF PD CATHETER;  Surgeon: Ananth Moore DO;  [facility];  Service: General    SD ESOPHAGOGASTRODUODENOSCOPY TRANSORAL DIAGNOSTIC N/A 4/18/2019    Procedure: ESOPHAGOGASTRODUODENOSCOPY (EGD); Surgeon: Manisha Adler MD;  Location: [facility];   Service: Gastroenterology    SD LAP INSERTION TUNNELED INTRAPERITONEAL CATHETER N/A 8/6/2018    Procedure: LAPAROSCOPIC PD CATHETER PLACEMENT;  Surgeon: Ananth Moore DO;  [facility];  Service: General    TONSILLECTOMY       Family History   Problem Relation Age of Onset   Judithe Spruce Breast cancer Mother     Hypertension Mother     Hyperlipidemia Father     Hypertension Father     Leukemia Maternal Grandmother     Hyperlipidemia Maternal Grandfather     Hypertension Maternal Grandfather     Hyperlipidemia Paternal Grandmother     Hypertension Paternal Grandmother     Heart disease Paternal Grandfather         cardiac disorder    Diabetes Paternal Grandfather        Occupational History: disabled     Social History: former smoker, quit 2 years ago, no ETOH of IVDA reported per mother    Meds/Allergies   Current Facility-Administered Medications   Medication Dose Route Frequency    acetaminophen (TYLENOL) tablet 650 mg  650 mg Oral Q6H PRN    aspirin chewable tablet 81 mg  81 mg Oral Daily    atorvastatin (LIPITOR) tablet 40 mg  40 mg Oral HS    calcium acetate (PHOSLO) capsule 667 mg  667 mg Oral TID With Meals    cholecalciferol (VITAMIN D3) tablet 2,000 Units  2,000 Units Oral Daily    cinacalcet (SENSIPAR) tablet 90 mg  90 mg Oral Daily    cloNIDine (CATAPRES) tablet 0 1 mg  0 1 mg Oral TID    escitalopram (LEXAPRO) tablet 10 mg  10 mg Oral Daily    famotidine (PEPCID) tablet 20 mg  20 mg Oral HS    gabapentin (NEURONTIN) capsule 100 mg  100 mg Oral HS    gentamicin (GARAMYCIN) 0 1 % ointment 1 application  1 application Topical Daily    heparin (porcine) subcutaneous injection 5,000 Units  5,000 Units Subcutaneous Q8H [facility]    hydrALAZINE (APRESOLINE) injection 10 mg  10 mg Intravenous Q4H PRN    hydrALAZINE (APRESOLINE) tablet 50 mg  50 mg Oral TID    insulin glargine (LANTUS) subcutaneous injection 12 Units 0 12 mL  12 Units Subcutaneous HS    insulin lispro (HumaLOG) 100 units/mL subcutaneous injection 1-5 Units  1-5 Units Subcutaneous TID AC    insulin lispro (HumaLOG) 100 units/mL subcutaneous injection 1-5 Units  1-5 Units Subcutaneous HS    insulin lispro (HumaLOG) 100 units/mL subcutaneous injection 5 Units  5 Units Subcutaneous TID With Meals    labetalol (NORMODYNE) tablet 150 mg  150 mg Oral BID    Labetalol HCl (NORMODYNE) injection 10 mg  10 mg Intravenous Q6H PRN    lactobacillus acidophilus-bulgaricus (FLORANEX) packet 1 packet  1 packet Oral HS    lisinopril (ZESTRIL) tablet 40 mg  40 mg Oral Daily    LORazepam (ATIVAN) 2 mg/mL injection **ADS Override Pull**        LORazepam (ATIVAN) 2 mg/mL injection **ADS Override Pull**        LORazepam (ATIVAN) 2 mg/mL injection **ADS Override Pull**        metoclopramide (REGLAN) injection 10 mg  10 mg Intravenous Q6H PRN    NIFEdipine (PROCARDIA XL) 24 hr tablet 60 mg  60 mg Oral Daily    ondansetron (ZOFRAN) injection 4 mg  4 mg Intravenous Q6H PRN    torsemide (DEMADEX) tablet 100 mg  100 mg Oral Daily With Lunch    valproate (DEPACON) 2,000 mg in sodium chloride 0 9 % 50 mL BOLUS  20 mg/kg Intravenous Once     Medications Prior to Admission   Medication    ADMELOG SOLOSTAR 100 units/mL injection pen    aspirin 81 mg chewable tablet    atorvastatin (LIPITOR) 40 mg tablet    b complex vitamins capsule    B-D ULTRAFINE III SHORT PEN 31G X 8 MM MISC    calcium acetate (PHOSLO) 667 mg capsule    Cholecalciferol (VITAMIN D) 2000 units CAPS    cinacalcet (SENSIPAR) 90 MG tablet    cloNIDine (CATAPRES) 0 1 mg tablet    escitalopram (LEXAPRO) 10 mg tablet    famotidine (PEPCID) 20 mg tablet    gabapentin (NEURONTIN) 100 mg capsule    gentamicin (GARAMYCIN) 0 1 % cream    GLUCAGON EMERGENCY 1 MG injection    hydrALAZINE (APRESOLINE) 100 MG tablet    hydrOXYzine HCL (ATARAX) 10 mg tablet    insulin glargine (BASAGLAR KWIKPEN) 100 units/mL injection pen    labetalol (NORMODYNE) 300 mg tablet    lisinopril (ZESTRIL) 40 mg tablet    NIFEdipine ER (ADALAT CC) 60 MG 24 hr tablet    ondansetron (ZOFRAN) 4 mg tablet    Probiotic Product (PROBIOTIC DAILY) CAPS    Sucroferric Oxyhydroxide (VELPHORO PO)    torsemide (DEMADEX) 100 mg tablet     Allergies   Allergen Reactions    Sulfa Antibiotics Rash       Vitals: Blood pressure (!) 244/129, pulse (!) 131, temperature 97 9 °F (36 6 °C), temperature source Oral, resp  rate 22, height 5' 11" (1 803 m), weight 100 kg (220 lb 9 6 oz), SpO2 99 %  , 100% 0 6/5P, Body mass index is 30 77 kg/m²      No intake or output data in the 24 hours ending 02/21/20 0837    Physical Exam  GEN - middle aged obese male initially with active seizure activity, sonorous respirations, right eye deviation and increased tone/rigidity  HEENT tongue laceration, rightward eye deviation, PERRL  NECK no accessory muscle uss  CV reg, tachy, single s1/2, no m/r  Pulm now with mechanical BS, no wheeze, no rales, no central rhonchi  ABD PD cath in place, now drained, clear return, +BS soft ND, no rebound  EXT no edema, warm, multiple skin excoriations    Labs: I have personally reviewed pertinent lab results  Results from last 7 days   Lab Units 02/21/20  0429 02/20/20  1804   WBC Thousand/uL 9 21 7 85   HEMOGLOBIN g/dL 11 1* 10 4*   HEMATOCRIT % 34 1* 32 6*   PLATELETS Thousands/uL 271 235   NEUTROS PCT %  --  60   MONOS PCT %  --  8      Results from last 7 days   Lab Units 02/21/20  0429 02/20/20  1804   POTASSIUM mmol/L 4 6 5 6*   CHLORIDE mmol/L 101 101   CO2 mmol/L 27 29   BUN mg/dL 52* 53*   CREATININE mg/dL 15 44* 15 32*   CALCIUM mg/dL 9 0 8 7   ALK PHOS U/L  --  77   ALT U/L  --  22   AST U/L  --  20     Results from last 7 days   Lab Units 02/20/20  1804   MAGNESIUM mg/dL 2 5          Results from last 7 days   Lab Units 02/20/20  1804   INR  1 02   PTT seconds 33         0   Lab Value Date/Time    TROPONINI 0 20 (H) 12/09/2019 2051    TROPONINI 0 18 (H) 12/09/2019 1725    TROPONINI 0 19 (H) 12/09/2019 1453    TROPONINI 0 15 (H) 12/09/2019 1115    TROPONINI 0 13 (H) 12/09/2019 0303    TROPONINI 0 02 12/07/2019 1835    TROPONINI 0 03 02/10/2018 2208    TROPONINI 0 02 02/10/2018 1744    TROPONINI 0 03 01/22/2018 2130    TROPONINI 0 05 (H) 01/22/2018 1748    TROPONINI 0 04 01/22/2018 1600    TROPONINI 0 04 01/22/2018 1015       Imaging and other studies: I have personally reviewed pertinent reports     and I have personally reviewed pertinent films in PACS  CXR T/L good positions, no PTX, no infiltrates    CT head - no acute mass/bleed or shift    MRI brain - no acute infarct or bleed    EKG, Pathology, and Other Studies: I have personally reviewed pertinent reports  TTE 1/2018 - 60-65%, normal RV, grade I diastolic dysfunction    Code Status: Level 1 - Full Code      Frank Buck DO, [facility]

## 2020-02-21 NOTE — H&P
H&P- Marla Nicolás 1985, 37 y o  male MRN: [id number]    Unit/Bed#: S -01 Encounter: [id number]    Primary Care Provider: Julián Coleman DO   Date and time admitted to hospital: 2/20/2020  5:23 PM      * Vertigo  Assessment & Plan  Assessment  · Patient is a 12-year-old male with a past medical history of CVA x2, previous MRI reports patient has questionable demyelinating disease  ?  · Patient reports for the past 2 days he has been getting vertigo and lightheaded as with no change in vision  He reports some difficulty finding words  But notes speech slur  Plan  · MRI brain:  Pending  · Previous MRI brain: 5/19: There are new area of T2 hyperintensity in the right medulla and right inferior cerebellar peduncle with resolution of a focus of hyperintensity in the right subinsular region  Suggesting dissemination in time and space  This is most suggestive of demyelinating disease  · CT head: no acute intracranial abnormality  · Continue statin and aspirin  · Continue heparin subcu  · Neuro checks q 4h  · Neuro consult pending    Type 1 diabetes mellitus with hypoglycemia [facility])  Assessment & Plan  Lab Results   Component Value Date    HGBA1C 5 8 12/09/2019       No results for input(s): POCGLU in the last 72 hours  Blood Sugar Average: Last 72 hrs:  · HB A1c pending  · Monitor Accu-Cheks  · Insulin sliding scale and place        End-stage renal disease on peritoneal dialysis [facility])  Assessment & Plan  · On PD nightly regimen at home  · Case discussed with nephrology, Dr Eboni Shaver, orders for PD will be placed  · Nephrology consult and place    Renovascular hypertension  Assessment & Plan  · Patient hypertensive on presentation to the hospital with systolic blood pressure in 180s  · Patient has not taken his evening BP meds yet  · Continue home meds  · Labetalol 10 mg IV p r n   As needed for systolic blood pressure greater than 160  · Continue to monitor blood pressure closely    Obesity (BMI 30 0-34  9)  Assessment & Plan  · Diet education    Hyperkalemia  Assessment & Plan  · Level in ED: 5 6  · Obtain EKG  · Nephro on board        VTE Prophylaxis: Heparin  / sequential compression device   Code Status: Level 1  POLST: POLST form is not discussed and not completed at this time  Anticipated Length of Stay:  Patient will be admitted on an Inpatient basis with an anticipated length of stay of  > 2 midnights  Justification for Hospital Stay: Need for further workup and Nephrology and neurology evaluation     Chief Complaint:   Dizziness x 2 days    History of Present Illness:    Concepcion Negro is a 37 y o  male with a past medical history significant for type 1 diabetes mellitus insulin-dependent, CVA , ESRD on PD, hypertension, dyslipidemia who presents to the [facility] ED with with complaints of dizziness for the past 2 days  Patient reports that he felt like the room was spinning, and also lightheadedness for the last 2 days intermittently  He also has some word-finding difficulty, but no slurred speech  Patient notes mild occipital headache, and nausea but no vomiting visual changes chest pain or shortness of breath  Patient does not report any falls or trauma to the head  Patient does not report any numbness tingling weakness  He does note that he has a mild jerk once in a while, which is new for him  Patient also notes that he has been twitching more often than usual   Patient denies any change in medication, and is denies any noncompliance with medication  Denies any fever chills or sick contacts recently  Patient follows up with Dr Cari Heart with Neurology  Review of Systems:    Review of Systems   Constitutional: Negative  HENT: Negative  Eyes: Negative  Respiratory: Negative for cough and shortness of breath  Cardiovascular: Negative for chest pain, palpitations and leg swelling  Gastrointestinal: Positive for nausea  Negative for constipation and vomiting  Genitourinary: Negative  Musculoskeletal: Negative  Skin: Negative  Neurological: Positive for dizziness, speech difficulty (Word finding difficulty), weakness, light-headedness and headaches  Negative for seizures and numbness  Psychiatric/Behavioral: Negative  All other systems reviewed and are negative  Past Medical and Surgical History:     Past Medical History:   Diagnosis Date    Acute kidney injury ([address] )     Anxiety     Cerebellar stroke side undetermined 2015 2015,1/2018    Diabetes type 1, controlled ([address] )     IDDM    Diarrhea     Gastroparesis     History of shingles 2010    History of transfusion 02/2018    Hypertension     Muscle weakness     general unsteadiness    Retinopathy        Past Surgical History:   Procedure Laterality Date    CARDIAC LOOP RECORDER  05/2018    EGD      EYE SURGERY      PERITONEAL CATHETER INSERTION N/A 8/27/2018    Procedure: UNROOF PD CATHETER;  Surgeon: Kofi Arthur DO;  [facility];  Service: General    DE ESOPHAGOGASTRODUODENOSCOPY TRANSORAL DIAGNOSTIC N/A 4/18/2019    Procedure: ESOPHAGOGASTRODUODENOSCOPY (EGD); Surgeon: Shahana Hammond MD;  Location: [facility]; Service: Gastroenterology    DE LAP INSERTION TUNNELED INTRAPERITONEAL CATHETER N/A 8/6/2018    Procedure: LAPAROSCOPIC PD CATHETER PLACEMENT;  Surgeon: Kofi Arthur DO;  [facility];  Service: General    TONSILLECTOMY         Meds/Allergies:    Prior to Admission medications    Medication Sig Start Date End Date Taking?  Authorizing Provider   ADMELOG SOLOSTAR 100 units/mL injection pen INJECT 10 UNITS WITH A SLIDING SCALE OF 1 UNIT FOR EVERY 25 OVER 150 SUBCUTANEOUSLY THREE TIMES DAILY WITH MEALS 7/5/19  Yes Daniel Daniel DO   aspirin 81 mg chewable tablet Chew 81 mg daily   Yes Historical Provider, MD   atorvastatin (LIPITOR) 40 mg tablet Take 1 tablet (40 mg total) by mouth every evening 2/20/20  Yes Daniel Daniel DO   b complex vitamins capsule Take 1 capsule by mouth daily   Yes Historical Provider, MD QUEEN ULTRAFINE III SHORT PEN 31G X 8 MM MISC USE 1 PEN NEEDLE 8 TIMES DAILY 9/24/19  Yes Daniel Daniel DO   calcium acetate (PHOSLO) 667 mg capsule Take 1 capsule (667 mg total) by mouth 3 (three) times a day with meals 9/17/18  Yes Pat Shelton PA-C   Cholecalciferol (VITAMIN D) 2000 units CAPS Take 1 capsule by mouth daily   Yes Historical Provider, MD   cinacalcet (SENSIPAR) 90 MG tablet Take 90 mg by mouth daily 9/4/19  Yes Historical Provider, MD   cloNIDine (CATAPRES) 0 1 mg tablet Take 0 2 mg by mouth 3 (three) times a day May add another 0 1 as needed   Yes Historical Provider, MD   ergocalciferol (ERGOCALCIFEROL) 85434 units capsule Take 2,000 Units by mouth daily    Yes Historical Provider, MD   Ergocalciferol (VITAMIN D2 PO) Vitamin D2 1,250 mcg (50,000 unit) capsule   Yes Historical Provider, MD   escitalopram (LEXAPRO) 10 mg tablet Take 1 tablet (10 mg total) by mouth daily 1/2/20  Yes Daniel Daniel DO   famotidine (PEPCID) 20 mg tablet Take 1 tablet (20 mg total) by mouth daily at bedtime 12/6/19  Yes Tony Huffman PA-C   gabapentin (NEURONTIN) 100 mg capsule Take 100 mg by mouth daily at bedtime 11/13/19  Yes Historical Provider, MD   gentamicin (GARAMYCIN) 0 1 % cream  11/27/18  Yes Historical Provider, MD   GLUCAGON EMERGENCY 1 MG injection INJECT 1MG SUBCUTANEOUSLY AS NEEDED (AS DIRECTED)   MAY REPEAT DOSE EVERY 20 MINUTES AS NEEDED 7/24/19  Yes Historical Provider, MD   hydrALAZINE (APRESOLINE) 100 MG tablet Take 0 5 tablets (50 mg total) by mouth 3 (three) times a day  Patient taking differently: Take 100 mg by mouth 3 (three) times a day 100 mg twice daily 9/17/18  Yes Pat Shelton PA-C   hydrOXYzine HCL (ATARAX) 10 mg tablet Take 1 tablet (10 mg total) by mouth every 6 (six) hours as needed for itching 1/2/20  Yes Daniel Daniel DO   insulin glargine (BASAGLAR KWIKPEN) 100 units/mL injection pen Inject 12u in AM and 15u in PM 4/9/19 Yes Franca Alcaraz PA-C   labetalol (NORMODYNE) 300 mg tablet Take 0 5 tablets (150 mg total) by mouth every 8 (eight) hours  Patient taking differently: Take 300 mg by mouth as needed  4/12/18  Yes DEISI Childress   lanthanum (FOSRENOL) 1000 MG chewable tablet CHEW 1 TABLET BY MOUTH WITH MEALS AND 1 TAB WITH SNACKS DAILY 9/11/19  Yes Historical Provider, MD   lisinopril (ZESTRIL) 40 mg tablet Take 40 mg by mouth daily   Yes Historical Provider, MD   mupirocin (BACTROBAN) 2 % ointment Apply topically 3 (three) times a day 7/2/19  Yes DEISI Mendoza   NIFEdipine ER (ADALAT CC) 60 MG 24 hr tablet Take 1 tablet (60 mg total) by mouth daily  Patient taking differently: Take 60 mg by mouth 2 (two) times a day   9/17/18  Yes Melisa Dowell PA-C   NON FORMULARY Medical marijuana, has medical card   Yes Historical Provider, MD   ondansetron (ZOFRAN) 4 mg tablet Take 1 tablet (4 mg total) by mouth every 8 (eight) hours as needed for nausea or vomiting  Patient taking differently: Take 4 mg by mouth every 8 (eight) hours as needed for nausea or vomiting  3/25/19  Yes Yarelis Talley DO   Probiotic Product (PROBIOTIC DAILY) CAPS Take 1 capsule by mouth daily   Yes Historical Provider, MD   Sucroferric Oxyhydroxide (VELPHORO PO) Velphoro   Yes Historical Provider, MD   torsemide (DEMADEX) 100 mg tablet Take 100 mg by mouth every morning 8/30/19  Yes Historical Provider, MD   atorvastatin (LIPITOR) 40 mg tablet Take 1 tablet (40 mg total) by mouth every evening 8/6/19 2/20/20  Yarelis Talley DO     I have reviewed home medications with patient personally  Allergies:    Allergies   Allergen Reactions    Sulfa Antibiotics Rash       Social History:     Marital Status: Single   Patient Pre-hospital Living Situation:  Home with family  Patient Pre-hospital Level of Mobility:  Uses a cane  Patient Pre-hospital Diet Restrictions:  Diabetic diet  Substance Use History:   Social History     Substance and Sexual Activity   Alcohol Use Not Currently    Comment: rarely     Social History     Tobacco Use   Smoking Status Former Smoker    Packs/day: 0 50    Years: 12 00    Pack years: 6 00    Types: Cigarettes    Last attempt to quit: 2018    Years since quittin 0   Smokeless Tobacco Never Used   Tobacco Comment    quit 2018     Social History     Substance and Sexual Activity   Drug Use Yes    Frequency: 5 0 times per week    Types: Marijuana    Comment: medical       Family History:    Family History   Problem Relation Age of Onset   Humza Lewis Breast cancer Mother     Hypertension Mother     Hyperlipidemia Father     Hypertension Father     Leukemia Maternal Grandmother     Hyperlipidemia Maternal Grandfather     Hypertension Maternal Grandfather     Hyperlipidemia Paternal Grandmother     Hypertension Paternal Grandmother     Heart disease Paternal Grandfather         cardiac disorder    Diabetes Paternal Grandfather        Physical Exam:     Vitals:   Blood Pressure: (!) 210/100 (20)  Pulse: 90 (20)  Temperature: 98 3 °F (36 8 °C) (20)  Temp Source: Oral (20)  Respirations: 18 (20)  Weight - Scale: 101 kg (223 lb) (20 1700)  SpO2: 95 % (20)    Physical Exam   Constitutional: He is oriented to person, place, and time  He appears well-developed and well-nourished  HENT:   Head: Normocephalic and atraumatic  Eyes: Pupils are equal, round, and reactive to light  Neck: Normal range of motion  Neck supple  Cardiovascular: Normal rate, regular rhythm, normal heart sounds and intact distal pulses  No murmur heard  Pulmonary/Chest: Effort normal and breath sounds normal  No respiratory distress  He exhibits no tenderness  Abdominal: Soft  Bowel sounds are normal  He exhibits no distension  There is no tenderness  Musculoskeletal: Normal range of motion  He exhibits no edema or tenderness     Neurological: He is alert and oriented to person, place, and time  No cranial nerve deficit  Skin: Skin is warm and dry  Psychiatric: His behavior is normal    Nursing note and vitals reviewed  Additional Data:     Lab Results: I have personally reviewed pertinent reports  Results from last 7 days   Lab Units 02/20/20  1804   WBC Thousand/uL 7 85   HEMOGLOBIN g/dL 10 4*   HEMATOCRIT % 32 6*   PLATELETS Thousands/uL 235   NEUTROS PCT % 60   LYMPHS PCT % 20   MONOS PCT % 8   EOS PCT % 11*     Results from last 7 days   Lab Units 02/20/20  1804   POTASSIUM mmol/L 5 6*   CHLORIDE mmol/L 101   CO2 mmol/L 29   BUN mg/dL 53*   CREATININE mg/dL 15 32*   CALCIUM mg/dL 8 7   ALK PHOS U/L 77   ALT U/L 22   AST U/L 20     Results from last 7 days   Lab Units 02/20/20  1804   INR  1 02       Imaging: I have personally reviewed pertinent reports  Ct Head Without Contrast    Result Date: 2/20/2020  Narrative: CT BRAIN - WITHOUT CONTRAST INDICATION:   Vertigo  COMPARISON:  12/9/2019  TECHNIQUE:  CT examination of the brain was performed  In addition to axial images, coronal 2D reformatted images were created and submitted for interpretation  Radiation dose length product (DLP) for this visit:  917 mGy-cm   This examination, like all CT scans performed in the [facility], was performed utilizing techniques to minimize radiation dose exposure, including the use of iterative reconstruction and automated exposure control  IMAGE QUALITY:  Diagnostic  FINDINGS: PARENCHYMA:  Periventricular and subcortical hypoattenuating foci consistent with microangiopathic disease No acute intracranial hemorrhage or mass effect  VENTRICLES AND EXTRA-AXIAL SPACES:  No hydrocephalus or extra-axial collection  VISUALIZED ORBITS AND PARANASAL SINUSES:  No retro-orbital mass or proptosis  Mild mucosal thickening in the right posterior ethmoid air cells and maxillary sinuses, likely chronic   CALVARIUM AND EXTRACRANIAL SOFT TISSUES:  No lytic or blastic lesion or soft tissue mass      Impression: No acute intracranial abnormality  Workstation performed: [id number]       EKG, Pathology, and Other Studies Reviewed on Admission:   · EKG: NSR in the ED    Epic / Care Everywhere Records Reviewed: Yes     ** Please Note: This note has been constructed using a voice recognition system   **

## 2020-02-21 NOTE — RESPIRATORY THERAPY NOTE
RT Ventilator Management Note  Saira Mckeon 37 y o  male MRN: [id number]  Unit/Bed#: ICU 02 Encounter: [id number]      Daily Screen       2/21/2020  1050             Patient safety screen outcome[de-identified]  Failed    Not Ready for Weaning due to[de-identified]  Underline problem not resolved            Physical Exam:   Assessment Type: (P) Assess only  General Appearance: (P) Sedated  Respiratory Pattern: (P) Assisted  Chest Assessment: (P) Chest expansion symmetrical      Resp Comments: (P) pt transferred from [facility], placed on vent previous AC settings will continue to  monitor

## 2020-02-21 NOTE — PROCEDURES
Intubation  Date/Time: 2/21/2020 8:30 AM  Performed by: Raj Gayle  Authorized by: DEISI Valentin     Patient location:  Bedside  Other Assisting Provider: Yes (comment) (Dr Cindy Bardales)    Consent:     Consent obtained:  Emergent situation  Universal protocol:     Immediately prior to procedure, a time out was called: yes      Patient identity confirmed:  Arm band  Pre-procedure details:     Patient status:  Altered mental status    Mallampati score:  2    Pretreatment medications:  Lorazepam and etomidate    Paralytics:  Vecuronium  Indications:     Indications for intubation: airway protection    Procedure details:     Preoxygenation:  Bag valve mask    CPR in progress: no      Intubation method:  Oral    Oral intubation technique: Mclaughlin  Laryngoscope blade: Mac 4    Tube size (mm):  8 0    Tube type:  Cuffed and hi-lo    Number of attempts:  1    Ventilation between attempts: no      Cricoid pressure: no      Tube visualized through cords: yes    Placement assessment:     ETT to lip:  24    ETT to teeth:  23    Tube secured with:  ETT hayden    Breath sounds:  Equal and absent over the epigastrium    Placement verification: chest rise, condensation, CXR verification, direct visualization, equal breath sounds, ETCO2 detector and tube exhalation      CXR findings:  ETT in proper place    Ventilator settings:  VC f20  FiO2 40 PEEP 5  Post-procedure details:     Patient tolerance of procedure:   Tolerated well, no immediate complications  Comments:      ETT advanced 1 cm following post-procedure CXR

## 2020-02-21 NOTE — PROGRESS NOTES
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  DM, diabetic nephropathy  ESRD  HTN  Dysplipidemia    Plan:  -  Transferred to [facility] for video EEG monitoring  -  The patient was loaded with Depacon this a m , Depakote level pending, placed on Depacon scheduled dosing  -  Currently intubated and on sedating medications, per ICU team management  -  Continue to treat underlying metabolic and infectious derangements, ICU management   -  Seizure precautions  -  Notify neurology with any changes in examination   -  Please see consultation completed, today by neurology    Subjective:   Per record review this patient is a 26-year-old male with history of multiple medical problems including end-stage renal disease on PD, history of prior stroke, history of hypertension  Per record review he does follow in the outpatient stroke clinic he was last seen by Sergei Arredondo on 11- - at that time he was on ASA and statin medication  Per Dr Gisela Pickard note on 03/20/2019 - the patient's history of type 1 diabetes mellitus with diabetic nephropathy, hypertension, dyslipidemia, history of prior lacunar infarct, homozygous MTHFR mutation, heterozygous for prothrombin [id number] mutation  At that time he was on Plavix and statin, he was switched to ASA, from Plavix per 5-3 notes, he was working forwards to a getting a possible pancreatic and kidney transplant  The patient presented to [address] [place] [address] with word-finding issues, dizziness and a few right arm jerks    It was reported that he was seizing at the time of neurology seeing him, further hx could not be obtained  It was thought that likely partial seizures was leading to presentation to the hospital, as well as subsequent status epilepticus requiring intubation  Is reported the patient received multiple doses of Ativan, and was loaded with Depacon 20 mg/kg, the plan was likely to continue on Depacon every 8 hours  Per critical care note it was reported the patient underwent peritoneal dialysis overnight, on the morning of 221 he ambulated to the bathroom at which point he had witnessed abnormal movements and rapid response activated  He was poorly responsive, he had alternating deviation of gaze and muscle rigidity, the patient was intubated for airway protection - the patient was given 2 g Depakote bolus, he received 10 mg of ativan during the rapid response  He as placed on propofol, versed and fentanyl, and was transferred to [facility] for video EEG monitoring  Per record review he had MRI of the brain done on 02/20 which showed no evidence of acute infarction or intracranial hemorrhage, he had a CT of the head done on 02/20 which showed no acute intracranial abnormality  ROS:  Unable patient is intubated and sedated  Vitals: Blood pressure (!) 198/110, pulse 82, temperature (!) 96 7 °F (35 9 °C), temperature source Rectal, resp  rate 16, SpO2 99 %  ,There is no height or weight on file to calculate BMI         Current Facility-Administered Medications:  acetaminophen 650 mg Oral Q6H PRN GATO GeorgeNP    aspirin 81 mg Oral Daily Arnold Lo, GATONP    atorvastatin 40 mg Oral HS GATO GeorgeNP    calcium acetate 667 mg Oral TID With Meals DEISI George    chlorhexidine 15 mL Swish & Spit Q12H [facility] [place], [place]    [START ON 2/22/2020] cholecalciferol 2,000 Units Oral Daily GATO GeorgeNP    [START ON 2/22/2020] cinacalcet 90 mg Oral Daily DEISI George    cloNIDine 0 1 mg Oral TID Leeanne Brittle Sri, GATONP    famotidine 20 mg Oral HS Sasha Cart, CRNP    fentaNYL 50 mcg/hr Intravenous Continuous Favio Banai, DO    gabapentin 100 mg Oral HS Sasha Cart, CRNP    [START ON 2/22/2020] gentamicin 1 application Topical Daily Sasha Cart, CRNP    heparin (porcine) 5,000 Units Subcutaneous [facility] Parag Fierro, GATONP    hydrALAZINE 10 mg Intravenous Q4H PRN Sasha Cart, CRNP    hydrALAZINE 50 mg Oral TID Sasha Cart, CRNP    insulin regular (HumuLIN R,NovoLIN R) infusion 0 3-21 Units/hr Intravenous Titrated Sasha Cart, CRNP Last Rate: 2 Units/hr (02/21/20 1131)   labetalol 150 mg Oral BID Sasha Cart, CRNP    Labetalol HCl 10 mg Intravenous Q6H PRN Sasha Cart, CRNP    lactobacillus acidophilus-bulgaricus 1 packet Oral HS Sasha Cart, CRNP    [START ON 2/22/2020] lisinopril 40 mg Oral Daily Sasha Cart, CRNP    midazolam 2 mg/hr Intravenous Continuous Sasha Cart, CRNP    niCARdipine 1-15 mg/hr Intravenous Titrated Favio Banai, DO    NIFEdipine 20 mg Oral [facility] Parag Fierro, CRNP    ondansetron 4 mg Intravenous Q6H PRN Sasha Cart, CRNP    torsemide 100 mg Oral Daily With Lunch Sasha Cart, CRNP      Physical Exam:    Vital signs reviewed  Constitutional - Intubated and sedated  HEENT - NC/AT  Cardiac - rate regular  Lungs - Intubated and on vent, in no distress noted  Extremities - No edema noted, in restraints in uppers  Skin - no rashes noted    Neurological    Mental status - intubated and sedated not following commands, comatose, non responsive  Cranial nerves 2 through 12     Motor - no movement observed, minimal withdrawal in uppers and lowers    Sensation -  Grimaced to stimuli in uppers  Coordination -  Unable to assess    Toes are upgoing bilaterally    Lab, Imaging and other studies:   I have personally reviewed pertinent reports    , CBC:   Results from last 7 days   Lab Units 02/21/20  0945 02/21/20  0073 02/21/20  0429 02/20/20  1804   WBC Thousand/uL 7 82  --  9 21 7 85   RBC Million/uL 3 02*  --  3 54* 3 34*   HEMOGLOBIN g/dL 9 8*  --  11 1* 10 4*   I STAT HEMOGLOBIN g/dl  --  10 2*  --   --    HEMATOCRIT % 29 0*  --  34 1* 32 6*   HEMATOCRIT, ISTAT %  --  30*  --   --    MCV fL 96  --  96 98   PLATELETS Thousands/uL 261  --  271 235   , BMP/CMP:   Results from last 7 days   Lab Units 02/21/20  0945 02/21/20  0850 02/21/20  0429 02/20/20  1804   SODIUM mmol/L 142  --  143 143   POTASSIUM mmol/L 5 4*  --  4 6 5 6*   CHLORIDE mmol/L 100  --  101 101   CO2 mmol/L 24  --  27 29   CO2, I-STAT mmol/L  --  24  --   --    BUN mg/dL 50*  --  52* 53*   CREATININE mg/dL 15 82*  --  15 44* 15 32*   GLUCOSE, ISTAT mg/dl  --  275*  --   --    CALCIUM mg/dL 9 0  --  9 0 8 7   AST U/L  --   --   --  20   ALT U/L  --   --   --  22   ALK PHOS U/L  --   --   --  77   EGFR ml/min/1 73sq m 3  --  4 4   , Vitamin B12:   , HgBA1C:   Results from last 7 days   Lab Units 02/21/20  0429   HEMOGLOBIN A1C % 5 6   , TSH:   , Coagulation:   Results from last 7 days   Lab Units 02/20/20  1804   INR  1 02   , Lipid Profile:   Results from last 7 days   Lab Units 02/21/20  0429   HDL mg/dL 38*   LDL CALC mg/dL 71   TRIGLYCERIDES mg/dL 93   , Ammonia:   , Urinalysis:       Invalid input(s): URIBILINOGEN, Drug Screen:   , Medication Drug Levels:       Invalid input(s): CARBAMAZEPINE,  PHENOBARB, LACOSAMIDE, OXCARBAZEPINE     Procedure: Xr Chest Portable    Result Date: 2/21/2020  Narrative: CHEST INDICATION:   intubation- tube placement  COMPARISON:  Chest radiograph from 12/7/2019  EXAM PERFORMED/VIEWS:  XR CHEST PORTABLE FINDINGS:  ET tube 5 cm above the julia  Cardiomediastinal silhouette appears unremarkable  Loop recorder in the left anterior chest wall  The lungs are clear  No pneumothorax or pleural effusion  Osseous structures appear within normal limits for patient age  Impression: No acute cardiopulmonary disease   ET tube 5 cm above the julia  Workstation performed: [id number]     Procedure: Ct Head Without Contrast    Result Date: 2/20/2020  Narrative: CT BRAIN - WITHOUT CONTRAST INDICATION:   Vertigo  COMPARISON:  12/9/2019  TECHNIQUE:  CT examination of the brain was performed  In addition to axial images, coronal 2D reformatted images were created and submitted for interpretation  Radiation dose length product (DLP) for this visit:  917 mGy-cm   This examination, like all CT scans performed in the [facility], was performed utilizing techniques to minimize radiation dose exposure, including the use of iterative reconstruction and automated exposure control  IMAGE QUALITY:  Diagnostic  FINDINGS: PARENCHYMA:  Periventricular and subcortical hypoattenuating foci consistent with microangiopathic disease No acute intracranial hemorrhage or mass effect  VENTRICLES AND EXTRA-AXIAL SPACES:  No hydrocephalus or extra-axial collection  VISUALIZED ORBITS AND PARANASAL SINUSES:  No retro-orbital mass or proptosis  Mild mucosal thickening in the right posterior ethmoid air cells and maxillary sinuses, likely chronic  CALVARIUM AND EXTRACRANIAL SOFT TISSUES:  No lytic or blastic lesion or soft tissue mass  Impression: No acute intracranial abnormality  Workstation performed: [id number]     Procedure: Mri Brain Wo Contrast    Result Date: 2/21/2020  Narrative: MRI BRAIN WITHOUT CONTRAST INDICATION: Dizziness  COMPARISON:   12/9/2019 and 2/20/2020  TECHNIQUE:  Sagittal T1, axial T2, axial FLAIR, axial T1, axial [place] and axial diffusion imaging  IMAGE QUALITY:  Diagnostic  FINDINGS: BRAIN PARENCHYMA: Chronic lacunar infarcts in the left centrum semiovale and left thalamus  Previously demonstrated punctate infarct in the posterior aspect of the velia is no longer visualized  No restricted diffusion to suggest an acute infarct  Periventricular and subcortical T2/FLAIR hyperintense foci consistent with microangiopathic disease  No intracranial hemorrhage or mass effect  VENTRICLES: Prominent perivascular spaces in the basal ganglia  Stable enlargement of the ventricles and sulci, consistent with volume loss, significantly advanced for age  SELLA AND PITUITARY GLAND:  Normal  ORBITS:  Intact globes and orbits  PARANASAL SINUSES:  Mucosal thickening throughout the ethmoid air cells and maxillary sinuses  VASCULATURE:  Evaluation of the major intracranial vasculature demonstrates appropriate flow voids  CALVARIUM AND SKULL BASE:  No osseous lesion  EXTRACRANIAL SOFT TISSUES:  No soft tissue mass  Impression: No evidence of acute infarct or intracranial hemorrhage   Workstation performed: [id number]

## 2020-02-21 NOTE — ASSESSMENT & PLAN NOTE
Assessment  · Patient is a 64-year-old male with a past medical history of CVA x2, previous MRI reports patient has questionable demyelinating disease  ?  · Patient reports for the past 2 days he has been getting vertigo and lightheaded as with no change in vision  He reports some difficulty finding words  But notes speech slur  Plan  · MRI brain:  Pending  · Previous MRI brain: 5/19: There are new area of T2 hyperintensity in the right medulla and right inferior cerebellar peduncle with resolution of a focus of hyperintensity in the right subinsular region  Suggesting dissemination in time and space   This is most suggestive of demyelinating disease  · CT head: no acute intracranial abnormality  · Continue statin and aspirin  · Continue heparin subcu  · Neuro checks q 4h  · Neuro consult pending

## 2020-02-21 NOTE — ASSESSMENT & PLAN NOTE
· On PD nightly regimen at home  · Case discussed with nephrology, Dr Chucky Poe, orders for PD will be placed    · Nephrology consult and place

## 2020-02-21 NOTE — DISCHARGE SUMMARY
Discharge Summary - Juan Mckeon 37 y o  male MRN: [id number]    Unit/Bed#: ICU 05 Encounter: [id number]    Admission Date:   Admission Orders (From admission, onward)     Ordered        02/20/20 1858  Inpatient Admission (expected length of stay for this patient Order details is greater than two midnights)  Once                     Admitting Diagnosis: Dizziness [R42]  Vertigo [R42]    HPI: Patient is a 37year old male presenting on 2/20 with a complaint of vertigo  He has a past medical history of end stage renal disease on peritoneal dialysis on transplant list, type 1 diabetes, hypertension, and history of CVAs  His intracranial imaging was normal and he was admitted to the medical-surgical floor for monitoring  Procedures Performed:   Orders Placed This Encounter   Procedures    ED ECG Documentation Only    Intubation       Summary of Hospital Course: He underwent peritoneal dialysis overnight  On the morning of 2/21 he ambulated to the bathroom at which point he had witnessed abnormal movements and a rapid response was called  He was poorly responsive  He had alternating deviated gaze with muscle rigidity  The decision was made to intubate the patient for airway protection  Neurology was at bedside who recommended a bolus of 2g of valproate  He received 10 mg of Ativan during the rapid response with little effect  He was successfully intubated with an 8/0 endotracheal tube  On transfer to the critical care unit, he was started on propofol, fentanyl, and Versed  A 3 hour valproic acid level was put in  On discussion with Neurology, was felt it was in the patient's best interest to transfer for continuous EEG  He has accepted the [address] [place] [address] and will be transferred to the critical care unit      Significant Findings, Care, Treatment and Services Provided:   2/21 CXR- no acute cardiopulmonary disease, endotracheal tube 5 cm above julia  2/21 ETT  2/20 MRI Brain- no evidence of acute infarct or intracranial hemorrhage  2/20 CT Head- no acute intracranial abnormality    Complications:  None    Discharge Diagnosis:   1  Status epilepticus  2  Respiratory failure  3  Acute encephalopathy  4  End-stage renal disease on peritoneal dialysis  5  Type 1 diabetes  6  Hypertension     Resolved Problems  Date Reviewed: 2/20/2020          Resolved    Hyperkalemia 2/21/2020     Resolved by  DEISI Crews          Condition at Discharge: critical         Discharge instructions/Information to patient and family:   See after visit summary for information provided to patient and family  Provisions for Follow-Up Care:  See after visit summary for information related to follow-up care and any pertinent home health orders  PCP: aMgo Mccormack DO    Disposition: Transferred to the [address] [place] [address] Readmission: Yes at [facility]  Future Encounters      3/12/2020 10:00 AM (30 min) [facility]    OFFICE VISIT LONG PG    [facility] [[facility]]    Mago Mccormack DO           3/16/2020  2:40 PM (20 min) [facility]    OFFICE VISIT SHORT PG    CARD BE [[facility]]    Madeline Burciaga MD          View all              Discharge Statement   I spent 35 minutes discharging the patient  This time was spent on the day of discharge  I had direct contact with the patient on the day of discharge  Additional documentation is required if more than 30 minutes were spent on discharge  Discharge Medications:  See after visit summary for reconciled discharge medications provided to patient and family

## 2020-02-21 NOTE — CONSULTS
PATIENT NAME: Daniel Mckeon  YOB: 1985   MEDICAL RECORD NUMBER: [id number]  Chief Complaint/Reason for Consult: dizziness  However I responded to a rapid response as I was reviewing his chart and he was found seizing on the ground  HPI: Margaret Copeland is a 37 y o  male with history of multiple medical problems including ESRD on PD, stroke, HTN who came in with word finding issues, some dizziness and a few right arm jerks  He is seizing when I saw him, could not obtain any further history at this point  PAST MEDICAL HISTORY  Past Medical History:   Diagnosis Date    Acute [facility] [address]     Anxiety     Cerebellar stroke side undetermined 2015 2015,1/2018    Diabetes type 1, controlled ([facility] [address] )     IDDM    Diarrhea     Gastroparesis     History of shingles 2010    History of transfusion 02/2018    Hypertension     Muscle weakness     general unsteadiness    Retinopathy         PAST SURGICAL HISTORY  Past Surgical History:   Procedure Laterality Date    CARDIAC LOOP RECORDER  05/2018    EGD      EYE SURGERY      PERITONEAL CATHETER INSERTION N/A 8/27/2018    Procedure: UNROOF PD CATHETER;  Surgeon: Jay Perez DO;  [facility];  Service: General    UT ESOPHAGOGASTRODUODENOSCOPY TRANSORAL DIAGNOSTIC N/A 4/18/2019    Procedure: ESOPHAGOGASTRODUODENOSCOPY (EGD); Surgeon: Kevin Zhang MD;  [facility];   Service: Gastroenterology    UT LAP INSERTION TUNNELED INTRAPERITONEAL CATHETER N/A 8/6/2018    Procedure: LAPAROSCOPIC PD CATHETER PLACEMENT;  Surgeon: Jay Perez DO;  [facility];  Service: General    TONSILLECTOMY          ALLERGIES: Sulfa antibiotics     CURRENT MEDICATIONS  Scheduled Meds:  Current Facility-Administered Medications:  LORazepam       acetaminophen 650 mg Oral Q6H PRN Chinedu Freedman MD   aspirin 81 mg Oral Daily Chinedu Freedman MD   atorvastatin 40 mg Oral HS Chinedu Freedman MD   calcium acetate 667 mg Oral TID With Meals Kristine Douglas MD   cholecalciferol 2,000 Units Oral Daily Kristine Douglas MD   cinacalcet 90 mg Oral Daily Kristine Douglas MD   cloNIDine 0 1 mg Oral TID Kristine Douglas MD   escitalopram 10 mg Oral Daily Kristine Douglas MD   famotidine 20 mg Oral HS Kristine Douglas MD   gabapentin 100 mg Oral HS Kristine Douglas MD   gentamicin 1 application Topical Daily Kristine Douglas MD   heparin (porcine) 5,000 Units Subcutaneous Q8H [address] Kristine Douglas MD   hydrALAZINE 10 mg Intravenous Q4H PRN Ronen Vivas PA-C   hydrALAZINE 50 mg Oral TID Kristine Douglas MD   insulin glargine 12 Units Subcutaneous HS Kristine Douglas MD   insulin lispro 1-5 Units Subcutaneous TID AC Kristine Douglas MD   insulin lispro 1-5 Units Subcutaneous HS Kristine Douglas MD   insulin lispro 5 Units Subcutaneous TID With Meals Kristine Douglas MD   labetalol 150 mg Oral BID Kristine Douglas MD   Labetalol HCl 10 mg Intravenous Q6H PRN Abi White MD   lactobacillus acidophilus-bulgaricus 1 packet Oral HS Kristine Douglas MD   lisinopril 40 mg Oral Daily Kristine Douglas MD   LORazepam       LORazepam       metoclopramide 10 mg Intravenous Q6H PRN Ronen Vivas PA-C   NIFEdipine ER 60 mg Oral Daily Kristine Douglas MD   ondansetron 4 mg Intravenous Q6H PRN Abi White MD   torsemide 100 mg Oral Daily With Lunch Kristine Douglas MD   valproate sodium (DEPACON) IV bolus 20 mg/kg Intravenous Once Phylliss Prime, CRNP     Continuous Infusions:   PRN Meds: acetaminophen    hydrALAZINE    Labetalol HCl    metoclopramide    ondansetron     SOCIAL HISTORY  Per records he quit smoking about 2 years ago  His smoking use included cigarettes  He has a 6 00 pack-year smoking history  He has never used smokeless tobacco  He reports that he drank alcohol  He reports that he has current or past drug history  Drug: Marijuana  Frequency: 5 00 times per week        FAMILY HISTORY  Family History   Problem Relation Age of Onset   [facility] Breast cancer Mother     Hypertension Mother    [facility] Hyperlipidemia Father     Hypertension Father     Leukemia Maternal Grandmother     Hyperlipidemia Maternal Grandfather     Hypertension Maternal Grandfather     Hyperlipidemia Paternal Grandmother     Hypertension Paternal Grandmother     Heart disease Paternal Grandfather         cardiac disorder    Diabetes Paternal Grandfather         REVIEW OF SYSTEMS   Unable to complete 12 point ROS due to convulsive status epilepticus  PHYSICAL EXAMINATION  Temp:  [97 9 °F (36 6 °C)-99 1 °F (37 3 °C)] 97 9 °F (36 6 °C)  HR:  [] 131  Resp:  [18-22] 22  BP: (189-244)/() 244/129   General Examination: eyes closed, deviated to the right when opening eyes  HEENT: Normocephalic, Atraumatic  + bleeding from the mouth  CVS: Regular rate and rhythm  S1 S2 noted  No audible murmurs  Lungs: b/l entry  Ext: No edema   Psych: Thought content - Unable to assess  Neurological Examination:   Mental Status: The patient was comatose/unresponsive  Not following any commands  Cranial Nerves:   CN 2-12 grossly intact, right eye deviation  Motor Examination/Sensory Exam  Some grimace and potential withdrawal in all 4 extremities but intermittently very rigid throughout  Reflexes:   Up going toes bilaterally  Coordination: unable to assess due to mental status         Gait: unable to assess      Labs:  Recent Results (from the past 24 hour(s))   CBC and differential    Collection Time: 02/20/20  6:04 PM   Result Value Ref Range    WBC 7 85 4 31 - 10 16 Thousand/uL    RBC 3 34 (L) 3 88 - 5 62 Million/uL    Hemoglobin 10 4 (L) 12 0 - 17 0 g/dL    Hematocrit 32 6 (L) 36 5 - 49 3 %    MCV 98 82 - 98 fL    MCH 31 1 26 8 - 34 3 pg    MCHC 31 9 31 4 - 37 4 g/dL    RDW 15 7 (H) 11 6 - 15 1 %    MPV 9 3 8 9 - 12 7 fL    Platelets 592 937 - 426 Thousands/uL    nRBC 0 /100 WBCs    Neutrophils Relative 60 43 - 75 %    Immat GRANS % 0 0 - 2 %    Lymphocytes Relative 20 14 - 44 %    Monocytes Relative 8 4 - 12 %    Eosinophils Relative 11 (H) 0 - 6 %    Basophils Relative 1 0 - 1 %    Neutrophils Absolute 4 75 1 85 - 7 62 Thousands/µL    Immature Grans Absolute 0 03 0 00 - 0 20 Thousand/uL    Lymphocytes Absolute 1 57 0 60 - 4 47 Thousands/µL    Monocytes Absolute 0 60 0 17 - 1 22 Thousand/µL    Eosinophils Absolute 0 83 (H) 0 00 - 0 61 Thousand/µL    Basophils Absolute 0 07 0 00 - 0 10 Thousands/µL   Protime-INR    Collection Time: 02/20/20  6:04 PM   Result Value Ref Range    Protime 12 8 11 6 - 14 5 seconds    INR 1 02 0 84 - 1 19   APTT    Collection Time: 02/20/20  6:04 PM   Result Value Ref Range    PTT 33 23 - 37 seconds   Comprehensive metabolic panel    Collection Time: 02/20/20  6:04 PM   Result Value Ref Range    Sodium 143 136 - 145 mmol/L    Potassium 5 6 (H) 3 5 - 5 3 mmol/L    Chloride 101 100 - 108 mmol/L    CO2 29 21 - 32 mmol/L    ANION GAP 13 4 - 13 mmol/L    BUN 53 (H) 5 - 25 mg/dL    Creatinine 15 32 (H) 0 60 - 1 30 mg/dL    Glucose 160 (H) 65 - 140 mg/dL    Calcium 8 7 8 3 - 10 1 mg/dL    AST 20 5 - 45 U/L    ALT 22 12 - 78 U/L    Alkaline Phosphatase 77 46 - 116 U/L    Total Protein 6 4 6 4 - 8 2 g/dL    Albumin 3 3 (L) 3 5 - 5 0 g/dL    Total Bilirubin 0 43 0 20 - 1 00 mg/dL    eGFR 4 ml/min/1 73sq m   Magnesium    Collection Time: 02/20/20  6:04 PM   Result Value Ref Range    Magnesium 2 5 1 6 - 2 6 mg/dL   Fingerstick Glucose (POCT)    Collection Time: 02/20/20  9:41 PM   Result Value Ref Range    POC Glucose 171 (H) 65 - 140 mg/dl   Basic metabolic panel    Collection Time: 02/21/20  4:29 AM   Result Value Ref Range    Sodium 143 136 - 145 mmol/L    Potassium 4 6 3 5 - 5 3 mmol/L    Chloride 101 100 - 108 mmol/L    CO2 27 21 - 32 mmol/L    ANION GAP 15 (H) 4 - 13 mmol/L    BUN 52 (H) 5 - 25 mg/dL    Creatinine 15 44 (H) 0 60 - 1 30 mg/dL    Glucose 212 (H) 65 - 140 mg/dL    Calcium 9 0 8 3 - 10 1 mg/dL    eGFR 4 ml/min/1 73sq m   CBC (With Platelets)    Collection Time: 02/21/20  4:29 AM Result Value Ref Range    WBC 9 21 4 31 - 10 16 Thousand/uL    RBC 3 54 (L) 3 88 - 5 62 Million/uL    Hemoglobin 11 1 (L) 12 0 - 17 0 g/dL    Hematocrit 34 1 (L) 36 5 - 49 3 %    MCV 96 82 - 98 fL    MCH 31 4 26 8 - 34 3 pg    MCHC 32 6 31 4 - 37 4 g/dL    RDW 15 5 (H) 11 6 - 15 1 %    Platelets 804 535 - 660 Thousands/uL    MPV 9 3 8 9 - 12 7 fL   Lipid Panel with Direct LDL reflex    Collection Time: 02/21/20  4:29 AM   Result Value Ref Range    Cholesterol 128 50 - 200 mg/dL    Triglycerides 93 <=150 mg/dL    HDL, Direct 38 (L) >=40 mg/dL    LDL Calculated 71 0 - 100 mg/dL   Fingerstick Glucose (POCT)    Collection Time: 02/21/20  7:53 AM   Result Value Ref Range    POC Glucose 226 (H) 65 - 140 mg/dl            panel  Results from last 7 days   Lab Units 02/21/20  0429   POTASSIUM mmol/L 4 6   CHLORIDE mmol/L 101   BUN mg/dL 52*   CREATININE mg/dL 15 44*    Results from last 7 days   Lab Units 02/21/20  0429   HEMATOCRIT % 34 1*   HEMOGLOBIN g/dL 11 1*   MCH pg 31 4   MCHC g/dL 32 6   MCV fL 96   MPV fL 9 3   PLATELETS Thousands/uL 271   RDW % 15 5*   WBC Thousand/uL 9 21    Results from last 7 days   Lab Units 02/20/20  1804   INR  1 02   PTT seconds 33    Results from last 7 days   Lab Units 02/21/20  0429   SODIUM mmol/L 143   CO2 mmol/L 27   BUN mg/dL 52*   CREATININE mg/dL 15 44*    Lab Results   Component Value Date    ALT 22 02/20/2020    AST 20 02/20/2020    ALKPHOS 77 02/20/2020    TBILI 0 43 02/20/2020    Results from last 7 days   Lab Units 02/21/20  0429   CHOLESTEROL mg/dL 128   HDL mg/dL 38*    Lab Results   Component Value Date    HGBA1C 5 8 12/09/2019    TSH i: No results found for: TSH Imaging: CT head   MRI  Brain - personally reviewed - no acute disease       ASSESSMENT/PLAN  38 yo male with multiple serious medical comorbidities with likely partial seizures leading him to present to the hospital and subsequent status epilepticus requiring intubation today       Patient received 6 mg of Ativan, loaded with depacon 20 mg /kg, would check a level in 2-4 hours, reload if < 80, goal level   LIkely continue 5 mg/kg of depacon Q8 but that may need to change based on above  VEEG monitoring  Has had suspicion for seizure in the past (see old EEG which was normal at the time)  Euglycemia, normothermia  SBP numbers here were durign his arm being extended and very rigid, likely inaccurate  Contacted epilepsy and neurology team      Total critical care time 35 minutes

## 2020-02-21 NOTE — RAPID RESPONSE
Progress Note - Rapid Response   Dylan Mckeon 37 y o  male MRN: [id number]    Time Called ( Time): 7521  Date Called: 2/21/2020  Level of Care: MS  Room#: 004  [id number] Time ( Time): 5732  Event End Time ( Time): 5721  Primary reason for call: Acute change in mental status  Interventions:  Airway/Breathing:  Oral Airway, Suctioned, Intubated, Bag Mask, O2 Mask/Nasal, ABG and CXR  Circulation: EKG  Other Treatments: Bolus valproic acid, 10 mg Ativan       Assessment:   1  Seizure    Plan:   · Maintain mechanical ventilation for airway protection, status post bolus of valproic acid will recheck level in 3 hours, aggressive sedation regimen including Versed/fentanyl/propofol, recheck chemistry/CBC, appreciate neurology recommendations, transfer for video EEG       HPI/Chief Complaint (Background/Situation): Skip Medina is a 37y o  year old male who presents on 02/21 with complaint of dizziness  He has a past medical history of type 1 diabetes, cerebrovascular accident, end-stage renal disease on peritoneal dialysis, hypertension, and hyperlipidemia  He had a nonfocal neurologic exam and is intracranial imaging was normal and was referred to medical-surgical floor for admission  Acutely on 02/21 he was noted to have muscular rigidity while using the bathroom  At this point a rapid response was called  On arrival the patient is poorly responsive with an alternating left/right deviated gaze  He was transitioned out of the restroom and intubated for airway protection  He was bolused valproic acid and heavily sedated  He was transferred to the ICU for closer monitoring      Historical Information   Past Medical History:   Diagnosis Date    [facility] kidney [facility] ([facility] [address] [facility]     Anxiety     Cerebellar stroke side undetermined 2015 2015,1/2018    Diabetes type 1, controlled ([facility] [address] )     IDDM    Diarrhea     Gastroparesis     History of shingles 2010    History of transfusion 2018    Hypertension     Muscle weakness     general unsteadiness    Retinopathy      Past Surgical History:   Procedure Laterality Date    CARDIAC LOOP RECORDER  2018    EGD      EYE SURGERY      PERITONEAL CATHETER INSERTION N/A 2018    Procedure: UNROOF PD CATHETER;  Surgeon: Pepe Amin DO;  [facility];  Service: General    IA ESOPHAGOGASTRODUODENOSCOPY TRANSORAL DIAGNOSTIC N/A 2019    Procedure: ESOPHAGOGASTRODUODENOSCOPY (EGD); Surgeon: Mark Castillo MD;  [facility];   Service: Gastroenterology    [place] LAP INSERTION TUNNELED INTRAPERITONEAL CATHETER N/A 2018    Procedure: LAPAROSCOPIC PD CATHETER PLACEMENT;  Surgeon: Pepe Amin DO;  [facility];  Service: General    TONSILLECTOMY       Social History   Social History     Substance and Sexual Activity   Alcohol Use Not Currently    Comment: rarely     Social History     Substance and Sexual Activity   Drug Use Yes    Frequency: 5 0 times per week    Types: Marijuana    Comment: medical     Social History     Tobacco Use   Smoking Status Former Smoker    Packs/day: 0 50    Years: 12 00    Pack years: 6 00    Types: Cigarettes    Last attempt to quit: 2018    Years since quittin 0   Smokeless Tobacco Never Used   Tobacco Comment    quit 2018     Family History:   Family History   Problem Relation Age of Onset    Breast cancer Mother     Hypertension Mother     Hyperlipidemia Father     Hypertension Father     Leukemia Maternal Grandmother     Hyperlipidemia Maternal Grandfather     Hypertension Maternal Grandfather     Hyperlipidemia Paternal Grandmother     Hypertension Paternal Grandmother     Heart disease Paternal Grandfather         cardiac disorder    Diabetes Paternal Grandfather        Meds/Allergies     Current Facility-Administered Medications:  acetaminophen 650 mg Oral Q6H PRN [facility]   aspirin 81 mg Oral Daily [facility] atorvastatin 40 mg Oral HS Ashley Harrison, CRNP   calcium acetate 667 mg Oral TID With Meals Ashley Harrison, CRCARLOS   chlorhexidine 15 mL Swish & Spit Q12H [address] Ashley Harrison, CRNP   cholecalciferol 2,000 Units Oral Daily Ashley Harrison, CRNP   cinacalcet 90 mg Oral Daily Ashley Harrison, CRNP   cloNIDine 0 1 mg Oral TID Ashley Harrison, CRNP   escitalopram 10 mg Oral Daily Ashley Harrison, CRNP   famotidine 20 mg Oral HS Ashley Harrison, CRNP   fentaNYL 50 mcg/hr Intravenous Continuous Ashley Harrison, CRNP   fentanyl citrate (PF) 50 mcg Intravenous Once Ashley Harrison, CRNP   gabapentin 100 mg Oral HS Ashley Harrison, CRNP   gentamicin 1 application Topical Daily Ashley Harrison, CRNP   heparin (porcine) 5,000 Units Subcutaneous Q8H [address], CRNP   hydrALAZINE 10 mg Intravenous Q4H PRN Ashley Harrison, CRNP   hydrALAZINE 50 mg Oral TID Ashley Harrison, CRNP   labetalol 150 mg Oral BID Ashley Harrison, CRNP   Labetalol HCl 10 mg Intravenous Q6H PRN Ashley Harrison, CRNP   lactobacillus acidophilus-bulgaricus 1 packet Oral HS Ashley Harrsion, CRNP   lisinopril 40 mg Oral Daily Ashley Harrison, CRNP   LORazepam       LORazepam       LORazepam       metoclopramide 10 mg Intravenous Q6H PRN Ashley Harrison, CRNP   midazolam 2 mg/hr Intravenous Continuous Ashley Harrison, CRNP   midazolam 4 mg Intravenous Once Ashley Harrison, CRNP   NIFEdipine ER 60 mg Oral Daily Ashley Harrison, CRNP   ondansetron 4 mg Intravenous Q6H PRN Ashley Harrison, CRNP   torsemide 100 mg Oral Daily With Lunch Parag Fierro, GATONP   valproate sodium (DEPACON) IV bolus 20 mg/kg Intravenous Once Ashley Harrison, CRNP         fentaNYL 50 mcg/hr   midazolam 2 mg/hr       Allergies   Allergen Reactions    Sulfa Antibiotics Rash       ROS:  Unable to obtain secondary to mental status    Vitals:   Vitals:    02/21/20 0801   BP: (!) 244/129   Pulse: (!) 131   Resp: 22   Temp:    SpO2:          Physical Exam:  Gen:  Unresponsive in mild respiratory distress  HEENT:  Atraumatic, pupils equal reactive, conjugate gaze, intermittent deviation to the left and right  Neck: Trachea midline, no JVD  Chest: Lungs clear to auscultation bilaterally, erythema with papular formation  Cor: Tachycardic, regular rhythm  Abd: Soft, non-distended, absent bowel sounds  Ext: Atraumatic, no clubbing/cyanosis/edema  Neuro: GCS E1 V1 M3  Skin: Warm, dry    No intake or output data in the 24 hours ending 02/21/20 0851    Respiratory    Lab Data (Last 4 hours)    None         O2/Vent Data (Last 4 hours)    None              Invasive Devices     Peripheral Intravenous Line            Peripheral IV 02/20/20 Left Antecubital less than 1 day                DIAGNOSTIC DATA:    Lab: I have personally reviewed pertinent lab results  CBC:   Results from last 7 days   Lab Units 02/21/20  0429   WBC Thousand/uL 9 21   HEMOGLOBIN g/dL 11 1*   HEMATOCRIT % 34 1*   PLATELETS Thousands/uL 271     CMP:   Results from last 7 days   Lab Units 02/21/20  0429 02/20/20  1804   POTASSIUM mmol/L 4 6 5 6*   CHLORIDE mmol/L 101 101   CO2 mmol/L 27 29   BUN mg/dL 52* 53*   CREATININE mg/dL 15 44* 15 32*   CALCIUM mg/dL 9 0 8 7   ALK PHOS U/L  --  77   ALT U/L  --  22   AST U/L  --  20     PT/INR:   Lab Results   Component Value Date    INR 1 02 02/20/2020   ,   Magnesium: No components found for: MAG,   Phosphorous: No results found for: PHOS    Microbiology:  Lab Results   Component Value Date    BLOODCX No Growth After 5 Days  12/09/2019    BLOODCX No Growth After 5 Days  12/09/2019    BLOODCX No Growth After 5 Days  12/09/2019    BLOODCX No Growth After 5 Days  12/09/2019    URINECX No Growth <1000 cfu/mL 12/10/2019    WOUNDCULT No growth 06/25/2019         OUTCOME:   Transferred to Critical Care Unit, Critical Care Attending notified to transfer and Attending service notified  Family notified of transfer: yes  Family member contacted:  Mother  Code Status: Level 1 - Full Code  Critical Care Time: Total Critical Care time spent 60 minutes excluding procedures, teaching and family updates

## 2020-02-21 NOTE — ASSESSMENT & PLAN NOTE
Lab Results   Component Value Date    HGBA1C 5 8 12/09/2019       No results for input(s): POCGLU in the last 72 hours      Blood Sugar Average: Last 72 hrs:  · HB A1c pending  · Monitor Accu-Cheks  · Insulin sliding scale and place

## 2020-02-22 ENCOUNTER — APPOINTMENT (INPATIENT)
Dept: NEUROLOGY | Facility: CLINIC | Age: 35
DRG: 100 | End: 2020-02-22
Payer: MEDICARE

## 2020-02-22 LAB
ALBUMIN SERPL BCP-MCNC: 3 G/DL (ref 3.5–5)
ALP SERPL-CCNC: 65 U/L (ref 46–116)
ALT SERPL W P-5'-P-CCNC: 16 U/L (ref 12–78)
ANION GAP SERPL CALCULATED.3IONS-SCNC: 14 MMOL/L (ref 4–13)
AST SERPL W P-5'-P-CCNC: 23 U/L (ref 5–45)
BASOPHILS # BLD AUTO: 0.09 THOUSANDS/ΜL (ref 0–0.1)
BASOPHILS NFR BLD AUTO: 1 % (ref 0–1)
BILIRUB SERPL-MCNC: 0.47 MG/DL (ref 0.2–1)
BUN SERPL-MCNC: 54 MG/DL (ref 5–25)
CALCIUM SERPL-MCNC: 8.6 MG/DL (ref 8.3–10.1)
CHLORIDE SERPL-SCNC: 100 MMOL/L (ref 100–108)
CO2 SERPL-SCNC: 25 MMOL/L (ref 21–32)
CREAT SERPL-MCNC: 16.2 MG/DL (ref 0.6–1.3)
EOSINOPHIL # BLD AUTO: 0.34 THOUSAND/ΜL (ref 0–0.61)
EOSINOPHIL NFR BLD AUTO: 3 % (ref 0–6)
ERYTHROCYTE [DISTWIDTH] IN BLOOD BY AUTOMATED COUNT: 15.5 % (ref 11.6–15.1)
GFR SERPL CREATININE-BSD FRML MDRD: 3 ML/MIN/1.73SQ M
GLUCOSE SERPL-MCNC: 100 MG/DL (ref 65–140)
GLUCOSE SERPL-MCNC: 102 MG/DL (ref 65–140)
GLUCOSE SERPL-MCNC: 103 MG/DL (ref 65–140)
GLUCOSE SERPL-MCNC: 111 MG/DL (ref 65–140)
GLUCOSE SERPL-MCNC: 113 MG/DL (ref 65–140)
GLUCOSE SERPL-MCNC: 125 MG/DL (ref 65–140)
GLUCOSE SERPL-MCNC: 125 MG/DL (ref 65–140)
GLUCOSE SERPL-MCNC: 135 MG/DL (ref 65–140)
GLUCOSE SERPL-MCNC: 181 MG/DL (ref 65–140)
GLUCOSE SERPL-MCNC: 182 MG/DL (ref 65–140)
GLUCOSE SERPL-MCNC: 186 MG/DL (ref 65–140)
GLUCOSE SERPL-MCNC: 197 MG/DL (ref 65–140)
GLUCOSE SERPL-MCNC: 223 MG/DL (ref 65–140)
GLUCOSE SERPL-MCNC: 232 MG/DL (ref 65–140)
GLUCOSE SERPL-MCNC: 255 MG/DL (ref 65–140)
GLUCOSE SERPL-MCNC: 288 MG/DL (ref 65–140)
GLUCOSE SERPL-MCNC: 69 MG/DL (ref 65–140)
GLUCOSE SERPL-MCNC: 74 MG/DL (ref 65–140)
GLUCOSE SERPL-MCNC: 80 MG/DL (ref 65–140)
GLUCOSE SERPL-MCNC: 83 MG/DL (ref 65–140)
GLUCOSE SERPL-MCNC: 96 MG/DL (ref 65–140)
HCT VFR BLD AUTO: 30.1 % (ref 36.5–49.3)
HGB BLD-MCNC: 9.7 G/DL (ref 12–17)
IMM GRANULOCYTES # BLD AUTO: 0.05 THOUSAND/UL (ref 0–0.2)
IMM GRANULOCYTES NFR BLD AUTO: 0 % (ref 0–2)
LYMPHOCYTES # BLD AUTO: 1.7 THOUSANDS/ΜL (ref 0.6–4.47)
LYMPHOCYTES NFR BLD AUTO: 14 % (ref 14–44)
MCH RBC QN AUTO: 31.4 PG (ref 26.8–34.3)
MCHC RBC AUTO-ENTMCNC: 32.2 G/DL (ref 31.4–37.4)
MCV RBC AUTO: 97 FL (ref 82–98)
MONOCYTES # BLD AUTO: 0.8 THOUSAND/ΜL (ref 0.17–1.22)
MONOCYTES NFR BLD AUTO: 7 % (ref 4–12)
NEUTROPHILS # BLD AUTO: 9.34 THOUSANDS/ΜL (ref 1.85–7.62)
NEUTS SEG NFR BLD AUTO: 75 % (ref 43–75)
NRBC BLD AUTO-RTO: 0 /100 WBCS
PLATELET # BLD AUTO: 249 THOUSANDS/UL (ref 149–390)
PMV BLD AUTO: 10.1 FL (ref 8.9–12.7)
POTASSIUM SERPL-SCNC: 4.9 MMOL/L (ref 3.5–5.3)
PROT SERPL-MCNC: 5.8 G/DL (ref 6.4–8.2)
RBC # BLD AUTO: 3.09 MILLION/UL (ref 3.88–5.62)
SODIUM SERPL-SCNC: 139 MMOL/L (ref 136–145)
VALPROATE SERPL-MCNC: 37 UG/ML (ref 50–100)
WBC # BLD AUTO: 12.32 THOUSAND/UL (ref 4.31–10.16)

## 2020-02-22 PROCEDURE — 99232 SBSQ HOSP IP/OBS MODERATE 35: CPT | Performed by: PHYSICIAN ASSISTANT

## 2020-02-22 PROCEDURE — 80164 ASSAY DIPROPYLACETIC ACD TOT: CPT | Performed by: INTERNAL MEDICINE

## 2020-02-22 PROCEDURE — 95715 VEEG EA 12-26HR INTMT MNTR: CPT

## 2020-02-22 PROCEDURE — 85025 COMPLETE CBC W/AUTO DIFF WBC: CPT | Performed by: INTERNAL MEDICINE

## 2020-02-22 PROCEDURE — 82948 REAGENT STRIP/BLOOD GLUCOSE: CPT

## 2020-02-22 PROCEDURE — 99233 SBSQ HOSP IP/OBS HIGH 50: CPT | Performed by: INTERNAL MEDICINE

## 2020-02-22 PROCEDURE — 95720 EEG PHY/QHP EA INCR W/VEEG: CPT | Performed by: PSYCHIATRY & NEUROLOGY

## 2020-02-22 PROCEDURE — 80053 COMPREHEN METABOLIC PANEL: CPT | Performed by: INTERNAL MEDICINE

## 2020-02-22 PROCEDURE — 99233 SBSQ HOSP IP/OBS HIGH 50: CPT | Performed by: ANESTHESIOLOGY

## 2020-02-22 RX ORDER — HYDRALAZINE HYDROCHLORIDE 20 MG/ML
20 INJECTION INTRAMUSCULAR; INTRAVENOUS EVERY 4 HOURS PRN
Status: DISCONTINUED | OUTPATIENT
Start: 2020-02-22 | End: 2020-02-28 | Stop reason: HOSPADM

## 2020-02-22 RX ORDER — LABETALOL 20 MG/4 ML (5 MG/ML) INTRAVENOUS SYRINGE
20 EVERY 2 HOUR PRN
Status: DISCONTINUED | OUTPATIENT
Start: 2020-02-22 | End: 2020-02-28 | Stop reason: HOSPADM

## 2020-02-22 RX ORDER — CAPTOPRIL 25 MG/1
50 TABLET ORAL 2 TIMES DAILY
Status: DISCONTINUED | OUTPATIENT
Start: 2020-02-22 | End: 2020-02-22

## 2020-02-22 RX ORDER — FUROSEMIDE 10 MG/ML
20 INJECTION INTRAMUSCULAR; INTRAVENOUS
Status: DISCONTINUED | OUTPATIENT
Start: 2020-02-22 | End: 2020-02-22

## 2020-02-22 RX ORDER — LABETALOL 20 MG/4 ML (5 MG/ML) INTRAVENOUS SYRINGE
10 ONCE
Status: COMPLETED | OUTPATIENT
Start: 2020-02-22 | End: 2020-02-22

## 2020-02-22 RX ORDER — ERYTHROMYCIN 250 MG/1
250 TABLET, COATED ORAL
Status: DISPENSED | OUTPATIENT
Start: 2020-02-22 | End: 2020-02-23

## 2020-02-22 RX ORDER — FUROSEMIDE 10 MG/ML
40 INJECTION INTRAMUSCULAR; INTRAVENOUS ONCE
Status: COMPLETED | OUTPATIENT
Start: 2020-02-22 | End: 2020-02-22

## 2020-02-22 RX ORDER — HYDRALAZINE HYDROCHLORIDE 20 MG/ML
10 INJECTION INTRAMUSCULAR; INTRAVENOUS ONCE
Status: COMPLETED | OUTPATIENT
Start: 2020-02-22 | End: 2020-02-22

## 2020-02-22 RX ORDER — LABETALOL 20 MG/4 ML (5 MG/ML) INTRAVENOUS SYRINGE
20 ONCE
Status: COMPLETED | OUTPATIENT
Start: 2020-02-22 | End: 2020-02-22

## 2020-02-22 RX ORDER — CLONIDINE 0.2 MG/24H
0.2 PATCH, EXTENDED RELEASE TRANSDERMAL WEEKLY
Status: DISCONTINUED | OUTPATIENT
Start: 2020-02-22 | End: 2020-02-24

## 2020-02-22 RX ORDER — LABETALOL 100 MG/1
150 TABLET, FILM COATED ORAL EVERY 12 HOURS SCHEDULED
Status: DISCONTINUED | OUTPATIENT
Start: 2020-02-22 | End: 2020-02-24

## 2020-02-22 RX ORDER — INSULIN GLARGINE 100 [IU]/ML
10 INJECTION, SOLUTION SUBCUTANEOUS EVERY MORNING
Status: DISCONTINUED | OUTPATIENT
Start: 2020-02-22 | End: 2020-02-22

## 2020-02-22 RX ADMIN — SODIUM CHLORIDE 10 MG/HR: 0.9 INJECTION, SOLUTION INTRAVENOUS at 23:21

## 2020-02-22 RX ADMIN — LABETALOL 20 MG/4 ML (5 MG/ML) INTRAVENOUS SYRINGE 10 MG: at 04:06

## 2020-02-22 RX ADMIN — SODIUM CHLORIDE 5 MG/HR: 0.9 INJECTION, SOLUTION INTRAVENOUS at 12:21

## 2020-02-22 RX ADMIN — HEPARIN SODIUM 5000 UNITS: 5000 INJECTION INTRAVENOUS; SUBCUTANEOUS at 21:23

## 2020-02-22 RX ADMIN — LABETALOL 20 MG/4 ML (5 MG/ML) INTRAVENOUS SYRINGE 20 MG: at 11:20

## 2020-02-22 RX ADMIN — ONDANSETRON 4 MG: 2 INJECTION INTRAMUSCULAR; INTRAVENOUS at 07:54

## 2020-02-22 RX ADMIN — HYDRALAZINE HYDROCHLORIDE 50 MG: 50 TABLET ORAL at 01:21

## 2020-02-22 RX ADMIN — CLONIDINE HYDROCHLORIDE 0.1 MG: 0.1 TABLET ORAL at 15:07

## 2020-02-22 RX ADMIN — HYDRALAZINE HYDROCHLORIDE 10 MG: 20 INJECTION INTRAMUSCULAR; INTRAVENOUS at 03:22

## 2020-02-22 RX ADMIN — SODIUM CHLORIDE 8 MG/HR: 0.9 INJECTION, SOLUTION INTRAVENOUS at 16:17

## 2020-02-22 RX ADMIN — LABETALOL 20 MG/4 ML (5 MG/ML) INTRAVENOUS SYRINGE 20 MG: at 02:36

## 2020-02-22 RX ADMIN — GENTAMICIN SULFATE 1 APPLICATION: 1 OINTMENT TOPICAL at 22:08

## 2020-02-22 RX ADMIN — VALPROATE SODIUM 750 MG: 100 INJECTION, SOLUTION INTRAVENOUS at 12:23

## 2020-02-22 RX ADMIN — SODIUM CHLORIDE 50 ML/HR: 234 INJECTION INTRAMUSCULAR; INTRAVENOUS; SUBCUTANEOUS at 19:37

## 2020-02-22 RX ADMIN — FUROSEMIDE 40 MG: 10 INJECTION, SOLUTION INTRAMUSCULAR; INTRAVENOUS at 15:07

## 2020-02-22 RX ADMIN — HEPARIN SODIUM 5000 UNITS: 5000 INJECTION INTRAVENOUS; SUBCUTANEOUS at 15:07

## 2020-02-22 RX ADMIN — VALPROATE SODIUM 750 MG: 100 INJECTION, SOLUTION INTRAVENOUS at 21:22

## 2020-02-22 RX ADMIN — ONDANSETRON 8 MG: 2 INJECTION INTRAMUSCULAR; INTRAVENOUS at 08:54

## 2020-02-22 RX ADMIN — CLONIDINE 0.2 MG: 0.2 PATCH TRANSDERMAL at 20:28

## 2020-02-22 RX ADMIN — GENTAMICIN SULFATE 1 APPLICATION: 1 OINTMENT TOPICAL at 01:00

## 2020-02-22 RX ADMIN — SODIUM CHLORIDE 50 ML/HR: 234 INJECTION INTRAMUSCULAR; INTRAVENOUS; SUBCUTANEOUS at 08:11

## 2020-02-22 RX ADMIN — SODIUM CHLORIDE 10 MG/HR: 0.9 INJECTION, SOLUTION INTRAVENOUS at 22:28

## 2020-02-22 RX ADMIN — VALPROATE SODIUM 750 MG: 100 INJECTION, SOLUTION INTRAVENOUS at 04:55

## 2020-02-22 RX ADMIN — HYDRALAZINE HYDROCHLORIDE 20 MG: 20 INJECTION INTRAMUSCULAR; INTRAVENOUS at 17:09

## 2020-02-22 RX ADMIN — LABETALOL 20 MG/4 ML (5 MG/ML) INTRAVENOUS SYRINGE 20 MG: at 04:55

## 2020-02-22 RX ADMIN — CAPTOPRIL 50 MG: 25 TABLET ORAL at 07:27

## 2020-02-22 RX ADMIN — HYDRALAZINE HYDROCHLORIDE 20 MG: 20 INJECTION INTRAMUSCULAR; INTRAVENOUS at 12:54

## 2020-02-22 RX ADMIN — SODIUM CHLORIDE 3 UNITS/HR: 9 INJECTION, SOLUTION INTRAVENOUS at 05:53

## 2020-02-22 RX ADMIN — CLONIDINE HYDROCHLORIDE 0.1 MG: 0.1 TABLET ORAL at 01:20

## 2020-02-22 RX ADMIN — HEPARIN SODIUM 5000 UNITS: 5000 INJECTION INTRAVENOUS; SUBCUTANEOUS at 05:51

## 2020-02-22 RX ADMIN — SODIUM CHLORIDE 5 MG/HR: 0.9 INJECTION, SOLUTION INTRAVENOUS at 07:57

## 2020-02-22 RX ADMIN — LABETALOL 20 MG/4 ML (5 MG/ML) INTRAVENOUS SYRINGE 20 MG: at 06:23

## 2020-02-22 RX ADMIN — SODIUM CHLORIDE 9 MG/HR: 0.9 INJECTION, SOLUTION INTRAVENOUS at 19:36

## 2020-02-22 NOTE — PROGRESS NOTES
Daily Progress Note - Critical Care   Fransisco Mckeon 37 y o  male MRN: [id number]  Unit/Bed#: ICU 02 Encounter: [id number]        ----------------------------------------------------------------------------------------  HPI/24hr events: Chris Valdes is a 37 y o  male with type 1 insulin dependent diabetes complicated with nephropathy, gastroparesis, retinopathy and neuropathy, history of CVA x 2, ESRD on PD 2/2 T1DM, hypertension, dyslipidemia, patient is on the pancreas/renal transplant list at [facility] who presented to [address] on 2/20/2020 with vertigo and lightheadedness for 2 days intermittently  He also had word finding difficulty but no slurred speech, occipital headache, and nausea  He noted that he felt he was twitching more than normal  The following day, patient was found to have seizures  He received 6 mg of Ativan, loaded with depacon 20 mg/kg and started on 5 mg/kg of depacon q8h    Patient was successfully intubated and transferred to [facility] for continuous EEG monitoring     2/22: patient with elevated blood pressure overnight, given multiple doses of labetalol and hydralazine  Will start patient on his home oral meds to maintain BP in range of 130-150  If blood pressure is not controlled will start cardene  Will resume diet today and start on SSI  However currently patient is nauseous and vomiting, I believe this is due to him being on a fentanyl drip yesterday along with a baseline known diagnosis of gastroparesis  Will try to manage this with zofran for nausea and erythromycin for gastric motility       ---------------------------------------------------------------------------------------  SUBJECTIVE  Patient is alert and oriented to time, place, and person  He is following commands  He does seem a bit lethargic this morning, but I did just wake him up when I came to examine him  He denies headache, dizziness, lightheadedness, vision changes, chest pain, sob, abdominal pain    Shortly after seeing him this morning the patient did have projectile bilious vomiting  Review of Systems  Review of systems was reviewed and negative unless stated above in HPI/24-hour events   ---------------------------------------------------------------------------------------  Assessment and Plan:       Neuro:   · Sedation plan: no sedation required  Patient taken off propofol and fentanyl    · Regulate sleep/wake cycle  · Diagnosis: Status epilepticus 2/2 unknown etiology -resolved  ? Plan:  § Continue depacon 750mg q8, likely increase due to low valproate level total 37  Will likely need to be reloaded and maintenance dose adjustment, but will defer to neurology for recommendations  § Depacon level this AM of 37  § Patient is currently extubated, awake and alert, following commands      CV:   · Diagnosis: renovascular Hypertension   ? Cardiac infusions: Cardene, 2 5 Units/min  ? Multiple boluses of labetalol and hydralazine inneffictive at controlling blood pressure  ? Will restart on all oral home meds: clonidine 0 1 tid, hydralazine 50mg q8, labetalol 150mg bid, lisinopril 40mg, nifedipine 60mg xr daily, torsemide 100mg daily  · Rhythm: NSR  ? Follow rhythm on telemetry     Pulm:   · Diagnosis: Patient extubated successfully overnight  Saturating well on room air  ? SBT plan:   § Chest PT ordered: yes   § Chlorhexidine ordered: yes   § HOB >30 degrees: yes      GI:   · Nutrition/diet plan: will start diabetic diet today once can tolerate and without nausea/vomiting  · Stress ulcer prophylaxis: Pepcid PO  · Bowel regimen: None currently     FEN:   · Nutrition: NPO   · Fluid/Diuretic plan: No intervention  · Electrolytes : K 4 9 this AM  ? Goal: K >4 0, Mag >2 0, and Phos >3 0     :   · Diagnosis: ESRD on PD   ? Nephro consult   ? Monitor electrolytes and Cr with daily BMP   · Indwelling Hoffman present: yes   ?  Urinary catheter still needed for Strict I and O in a critically ill patient      ID:   · Diagnosis: No acute issues   ? Elevated lactic acidosis likely due to seizure activity   ? Trend temps and WBC count     Heme:   · Diagnosis: No acute disease   ? Trend hgb and plts: 9 7 and 249 respectively       Endo:   · Diagnosis: Type 1 DM on insulin -continued on insulin drip and d10 due to nausea/vomiting, patient is not eating yet  · Glycemic control plan: Blood glucose not controlled  Start insulin infusion  Once eating will transition to home meds and SSI  · Will start on diabetic diet with patient's home insulin regiment lantus 12 units in AM and 15 units PM   Will start lispro 5 units with meals, once the patient can tolerate         Msk/Skin:  · Mobility goal:   ? PT consult: not applicable  ? OT consult: not applicable  · Frequent turning and off-loading    Disposition: Continue Critical Care   Code Status: Level 1 - Full Code  ---------------------------------------------------------------------------------------  ICU CORE MEASURES    Prophylaxis   VTE Pharmacologic Prophylaxis: Heparin  VTE Mechanical Prophylaxis: sequential compression device  Stress Ulcer Prophylaxis: Famotidine PO    ABCDE Protocol (if indicated)  Plan to perform spontaneous awakening trial today? Yes  Plan to perform spontaneous breathing trial today? Yes  Obvious barriers to extubation? Yes  CAM-ICU: Negative    Invasive Devices Review  Invasive Devices     Peripheral Intravenous Line            Peripheral IV 02/20/20 Left Antecubital 1 day    Peripheral IV 02/21/20 Dorsal (posterior); Left Forearm less than 1 day    Peripheral IV 02/21/20 Left;Ventral (anterior) Hand less than 1 day              Can any invasive devices be discontinued today?  No (provide explanation): medical therapy required  ---------------------------------------------------------------------------------------  OBJECTIVE    Vitals   Vitals:    02/22/20 0530 02/22/20 0559 02/22/20 0600 02/22/20 0727   BP: (!) 185/94  (!) 185/96 (!) 197/98   BP Location:       Pulse: 78 80    Resp: 18  20    Temp:       TempSrc:       SpO2: 99%  99%    Weight:  103 kg (226 lb 13 7 oz)     Height:         Temp (24hrs), Av 1 °F (36 7 °C), Min:96 7 °F (35 9 °C), Max:98 8 °F (37 1 °C)  Current: Temperature: 98 8 °F (37 1 °C)      Physical Exam   Constitutional: He is oriented to person, place, and time  He appears well-developed and well-nourished  No distress  HENT:   Head: Normocephalic and atraumatic  Eyes: Pupils are equal, round, and reactive to light  No scleral icterus  Neck: Neck supple  No JVD present  No tracheal deviation present  No thyromegaly present  Cardiovascular: Normal rate, regular rhythm, normal heart sounds and intact distal pulses  Exam reveals no gallop and no friction rub  No murmur heard  Pulmonary/Chest: Effort normal and breath sounds normal  No stridor  No respiratory distress  He has no wheezes  He has no rales  He exhibits no tenderness  Abdominal: Soft  Bowel sounds are normal  He exhibits no distension and no mass  There is no tenderness  There is no guarding  No hernia  Musculoskeletal: Normal range of motion  He exhibits no edema, tenderness or deformity  Lymphadenopathy:     He has no cervical adenopathy  Neurological: He is alert and oriented to person, place, and time  Skin: Skin is warm and dry  No rash noted  He is not diaphoretic  No erythema  No pallor  Nursing note and vitals reviewed          Respiratory:  SpO2: SpO2: 99 %, SpO2 Activity: SpO2 Activity: At Rest, SpO2 Device: O2 Device: Other (comment)(vent), Capnography:         Invasive/non-invasive ventilation settings   Respiratory    Lab Data (Last 4 hours)    None         O2/Vent Data (Last 4 hours)    None                Laboratory and Diagnostics:  Results from last 7 days   Lab Units 20  0501 20  1150 20  0945 20  0850 20  0429 20  1804   WBC Thousand/uL 12 32* 8 65 7 82  --  9 21 7 85   HEMOGLOBIN g/dL 9 7* 9 2* 9 8*  --  11 1* 10 4*   I STAT HEMOGLOBIN g/dl  --   --   --  10 2*  --   --    HEMATOCRIT % 30 1* 27 2* 29 0*  --  34 1* 32 6*   HEMATOCRIT, ISTAT %  --   --   --  30*  --   --    PLATELETS Thousands/uL 249 253 261  --  271 235   NEUTROS PCT % 75  --   --   --   --  60   MONOS PCT % 7  --   --   --   --  8     Results from last 7 days   Lab Units 02/22/20  0501 02/21/20  1150 02/21/20  0945 02/21/20  0850 02/21/20  0429 02/20/20  1804   SODIUM mmol/L 139 137 142  --  143 143   POTASSIUM mmol/L 4 9 4 8 5 4*  --  4 6 5 6*   CHLORIDE mmol/L 100 100 100  --  101 101   CO2 mmol/L 25 24 24  --  27 29   CO2, I-STAT mmol/L  --   --   --  24  --   --    ANION GAP mmol/L 14* 13 18*  --  15* 13   BUN mg/dL 54* 50* 50*  --  52* 53*   CREATININE mg/dL 16 20* 15 20* 15 82*  --  15 44* 15 32*   CALCIUM mg/dL 8 6 8 9 9 0  --  9 0 8 7   GLUCOSE RANDOM mg/dL 223* 216* 255*  --  212* 160*   ALT U/L 16  --   --   --   --  22   AST U/L 23  --   --   --   --  20   ALK PHOS U/L 65  --   --   --   --  77   ALBUMIN g/dL 3 0*  --   --   --   --  3 3*   TOTAL BILIRUBIN mg/dL 0 47  --   --   --   --  0 43     Results from last 7 days   Lab Units 02/21/20  1150 02/21/20  0945 02/20/20  1804   MAGNESIUM mg/dL 2 8* 2 8* 2 5      Results from last 7 days   Lab Units 02/20/20  1804   INR  1 02   PTT seconds 33          Results from last 7 days   Lab Units 02/21/20  1150 02/21/20  0945   LACTIC ACID mmol/L 3 5* 5 1*     ABG:  Results from last 7 days   Lab Units 02/21/20  1149   PH ART  7 454*   PCO2 ART mm Hg 33 2*   PO2 ART mm Hg 170 0*   HCO3 ART mmol/L 22 8   BASE EXC ART mmol/L -0 8   ABG SOURCE  Radial, Right     VBG:  Results from last 7 days   Lab Units 02/21/20  1149   ABG SOURCE  Radial, Right           Micro        EKG: reviewed  Imaging:  I have personally reviewed pertinent reports        Intake and Output  I/O       02/20 0701 - 02/21 0700 02/21 0701 - 02/22 0700    I V  (mL/kg)  538 2 (5 6)    IV Piggyback  250    Total Intake(mL/kg)  788 2 (8 2)    Urine (mL/kg/hr)  350    Emesis/NG output  200    Total Output  550    Net  +238 2                  Height and Weights   Height: 5' 11" (180 3 cm)  IBW: 75 3 kg  Body mass index is 31 64 kg/m²  Weight (last 2 days)     Date/Time   Weight    02/22/20 0559   103 (226 85)    02/21/20 1300   96 7 (213 19)                Nutrition       Diet Orders   (From admission, onward)             Start     Ordered    02/22/20 0630  Diet Charanjit/CHO Controlled; Consistent Carbohydrate Diet Level 3 (6 carb servings/90 grams CHO/meal)  Diet effective now     Question Answer Comment   Diet Type Charanjit/CHO Controlled    Charanjit/CHO Controlled Consistent Carbohydrate Diet Level 3 (6 carb servings/90 grams CHO/meal)    RD to adjust diet per protocol?  Yes        02/22/20 0629    02/21/20 1056  Room Service  Once     Question:  Type of Service  Answer:  Room Service - Appropriate with Assistance    02/21/20 1055                    Active Medications  Scheduled Meds:    Current Facility-Administered Medications:  acetaminophen 650 mg Oral Q6H PRN Nonda Marisol, CRNP    aspirin 81 mg Oral Daily Nonda Marisol, CRNP    atorvastatin 40 mg Oral HS Nonda Marisol, CRNP    calcium acetate 667 mg Oral TID With Meals Nonda Marisol, CRNP    captopril 50 mg Oral BID Favio Banai, DO    chlorhexidine 15 mL Swish & Spit Q12H [address] Nonda Marisol, CRNP    cholecalciferol 2,000 Units Oral Daily Nonda Marisol, CRNP    cinacalcet 90 mg Oral Daily Nonda Marisol, CRNP    cloNIDine 0 1 mg Oral TID Nonda Marisol, CRNP    famotidine 20 mg Oral HS Nonda Marisol, CRNP    fentaNYL 25 mcg/hr Intravenous Continuous Fabiola Condon MD Last Rate: Stopped (02/21/20 1700)   gabapentin 100 mg Oral HS Nonda Marisol, CRNP    gentamicin 1 application Topical Daily Nonda Marisol, CRNP    heparin (porcine) 5,000 Units Subcutaneous [facility] DEISI Casas    hydrALAZINE 50 mg Oral TID Favio Jud, DO    insulin glargine 10 Units Subcutaneous QAM Favio [facility], DO    insulin lispro 5 Units Subcutaneous TID With Meals Favio Ban, DO    insulin regular (HumuLIN R,NovoLIN R) infusion 0 3-21 Units/hr Intravenous Titrated [facility] Last Rate: 2 5 Units/hr (02/22/20 0720)   labetalol 150 mg Oral Q12H [address] [facility]    Labetalol HCl 20 mg Intravenous Q2H PRN Favio [facility], DO    lactobacillus acidophilus-bulgaricus 1 packet Oral HS DEISI Casas    niCARdipine 1-15 mg/hr Intravenous Titrated [facility], DO    ondansetron 4 mg Intravenous Q6H PRN [facility]    propofol 5-50 mcg/kg/min Intravenous Titrated Usha Lai MD Last Rate: Stopped (02/21/20 2145)   dextrose 10 % and sodium chloride 0 9 % 50 mL/hr Intravenous Continuous Dayne A Paster, DO Last Rate: Stopped (02/22/20 0721)   torsemide 100 mg Oral Daily With Lunch [facility], DO    valproate sodium 750 mg Intravenous Q8H [address] [facility], DO Last Rate: Stopped (02/22/20 0501)     Continuous Infusions:    fentaNYL 25 mcg/hr Last Rate: Stopped (02/21/20 1700)   insulin regular (HumuLIN R,NovoLIN R) infusion 0 3-21 Units/hr Last Rate: 2 5 Units/hr (02/22/20 0720)   niCARdipine 1-15 mg/hr    propofol 5-50 mcg/kg/min Last Rate: Stopped (02/21/20 2145)   dextrose 10 % and sodium chloride 0 9 % 50 mL/hr Last Rate: Stopped (02/22/20 0721)     PRN Meds:     acetaminophen 650 mg Q6H PRN   Labetalol HCl 20 mg Q2H PRN   ondansetron 4 mg Q6H PRN       Allergies   Allergies   Allergen Reactions    Sulfa Antibiotics Rash     ---------------------------------------------------------------------------------------  Advance Directive and Living Will:      Power of :    POLST:    ---------------------------------------------------------------------------------------  Counseling / Coordination of Care  Total Critical Care time spent 30 minutes excluding procedures, teaching and family updates        Anisha Wilburn DO      Portions of the record may have been created with voice recognition software  Occasional wrong word or "sound a like" substitutions may have occurred due to the inherent limitations of voice recognition software    Read the chart carefully and recognize, using context, where substitutions have occurred

## 2020-02-22 NOTE — PROGRESS NOTES
Follow up Consultation    Nephrology   Jose Michel Linda 37 y o  male MRN: [id number]  Unit/Bed#: ICU 02 Encounter: [id number]      Physician Requesting Consult: Author Alyse DO  Reason for Consult:  Dialysis management       ASSESSMENT/PLAN:  47-year-old male past medical history of homozygous MTHFR mutation, anemia, obesity, diabetes, hypertension and ESRD on PD initially admitted to [address] [place] [address] with dizziness lightheadedness subsequently had a seizure and was intubated and transferred over to [facility] for higher level of care for continuous EEG monitoring  Nephrology following for dialysis management  ESRD:  - Gets usual peritoneal dialysis with [facility]CPD follows at [facility] dialysis  - follows in   - Will do PD today- orders in place   - Estimated dry weight = 94 kilos  - Access - PD catheter  - outpatient orders are 4 cycles of 2200 with last fill of 1 5 L total time of 10 hours  - Acid base and lytes stable  - Renal diet     H/H:   -on Epogen 78544 units weekly as an outpatient hold for now due to acute seizures  - maintain hemoglobin greater than 8 grams/deciliter  - most recent hemoglobin at 9 7 grams/deciliter  - Goal Hb 10-12gm/dL     Bone Mineral Disease:  - check ca, phos  - follow-up by PTH as an outpatient  - on PhosLo 1 tab p o  T i d , Sensipar 90 mg p o  Q day, vitamin-D 2000 units p o  Q day     BP:  - stable, on  clonidine 0 1 mg p o  T i d  hydralazine 50 mg p o  T i d  labetalol 150 mg p o  B i d  Copper torsemide 100 mg p o  Q day     Volume status:  - Clinically is euvolemic    - continue current orders    Seizure episode:  - management as per Primary team  - follow-up with neurology  - on valproate    Diabetes:  - Management as per primary team  - on insulin          Thanks for the consult  Will continue to follow  Please call with questions    Above-mentioned orders and Plan in terms of dialysis was discussed with the team in Jazzy Mckeon MD, FASN, 2/22/2020, 5:13 PM            Objective :  Patient seen and examined in the ICU earlier this a m  With family at bedside no overnight events was extubated doing well at some bouts of nausea intermittently  Was a little groggy  Has never had episodes of peritonitis  No issues with PD overnight  PHYSICAL EXAM  /76   Pulse 98   Temp 98 6 °F (37 °C)   Resp 16   Ht 5' 11" (1 803 m)   Wt 103 kg (226 lb 13 7 oz)   SpO2 99%   BMI 31 64 kg/m²   Temp (24hrs), Av 5 °F (36 9 °C), Min:97 9 °F (36 6 °C), Max:99 °F (37 2 °C)        Intake/Output Summary (Last 24 hours) at 2020 1713  Last data filed at 2020 1701  Gross per 24 hour   Intake 2405 68 ml   Output 510 ml   Net 1895 68 ml       I/O last 24 hours: In: 2644 2 [P O :480; I V :1814 2; IV Piggyback:350]  Out: 860 [Urine:660; Emesis/NG output:200]      Current Weight: Weight - Scale: 103 kg (226 lb 13 7 oz)  First Weight: Weight - Scale: 96 7 kg (213 lb 3 oz)  Physical Exam   Constitutional: He is oriented to person, place, and time  He appears well-developed and well-nourished  No distress  HENT:   Head: Normocephalic and atraumatic  Mouth/Throat: Oropharynx is clear and moist  No oropharyngeal exudate  Eyes: Conjunctivae are normal  No scleral icterus  Neck: Neck supple  No JVD present  Cardiovascular: Normal heart sounds  Exam reveals no friction rub  Pulmonary/Chest: Effort normal  He has no wheezes  He has no rales  Abdominal: Soft  He exhibits no mass  There is no tenderness  PD catheter in place   Musculoskeletal: He exhibits no edema  Neurological: He is alert and oriented to person, place, and time  Skin: Skin is warm and dry  He is not diaphoretic  No pallor  Psychiatric: He has a normal mood and affect  Nursing note and vitals reviewed  Review of Systems   Constitutional: Positive for fatigue  Negative for chills and fever  HENT: Negative for congestion  Respiratory: Negative for cough and shortness of breath  Cardiovascular: Negative for chest pain and leg swelling  Gastrointestinal: Negative for abdominal pain, constipation, diarrhea, nausea and vomiting  Genitourinary: Negative for flank pain  Musculoskeletal: Negative for back pain  Neurological: Negative for dizziness and headaches  Psychiatric/Behavioral: Negative for agitation and confusion  All other systems reviewed and are negative        Scheduled Meds:  Current Facility-Administered Medications:  acetaminophen 650 mg Oral Q6H PRN [facility], CRNP    aspirin 81 mg Oral Daily [facility], CRNP    atorvastatin 40 mg Oral HS [facility], CRNP    calcium acetate 667 mg Oral TID With Meals [facility], CRNP    chlorhexidine 15 mL Swish & Spit Q12H [address] [facility], CRNP    cholecalciferol 2,000 Units Oral Daily [facility], CRNP    cinacalcet 90 mg Oral Daily [facility], CRNP    cloNIDine 0 1 mg Oral TID [facility], CRNP    erythromycin base 250 mg Oral TID AC Favio Banai, DO    famotidine 20 mg Oral HS [facility], DEISI    fentaNYL 25 mcg/hr Intravenous Continuous Nathan Castillo MD Last Rate: Stopped (02/21/20 1700)   gabapentin 100 mg Oral HS [facility], DEISI    gentamicin 1 application Topical Daily [facility], DEISI    heparin (porcine) 5,000 Units Subcutaneous Replaced by [facility] DEISI Casas    hydrALAZINE 20 mg Intravenous Q4H PRN Favio Banai, DO    hydrALAZINE 50 mg Oral TID Favio Banai, DO    insulin regular (HumuLIN R,NovoLIN R) infusion 0 3-21 Units/hr Intravenous Titrated [facility] Last Rate: 6 Units/hr (02/22/20 1623)   labetalol 150 mg Oral Q12H HALIE Favio Banai, DO    Labetalol HCl 20 mg Intravenous Q2H PRN Favio Banai, DO    lactobacillus acidophilus-bulgaricus 1 packet Oral HS DEISI Casas    niCARdipine 1-15 mg/hr Intravenous Titrated Favio Banai, DO Last Rate: 7 mg/hr (02/22/20 1644)   ondansetron 8 mg Intravenous Q6H PRN Favio Banai, DO    propofol 5-50 mcg/kg/min Intravenous Titrated Wandy Harris MD Last Rate: Stopped (02/21/20 2145)   dextrose 10 % and sodium chloride 0 9 % 50 mL/hr Intravenous Continuous Dayne A Paster, DO Last Rate: 50 mL/hr (02/22/20 0811)   torsemide 100 mg Oral Daily With Lunch Favio Banai, DO    valproate sodium 750 mg Intravenous Q8H [address] Favio Banai, DO Last Rate: Stopped (02/22/20 1339)       PRN Meds:   acetaminophen    hydrALAZINE    Labetalol HCl    ondansetron    Continuous Infusions:  fentaNYL 25 mcg/hr Last Rate: Stopped (02/21/20 1700)   insulin regular (HumuLIN R,NovoLIN R) infusion 0 3-21 Units/hr Last Rate: 6 Units/hr (02/22/20 1623)   niCARdipine 1-15 mg/hr Last Rate: 7 mg/hr (02/22/20 1644)   propofol 5-50 mcg/kg/min Last Rate: Stopped (02/21/20 2145)   dextrose 10 % and sodium chloride 0 9 % 50 mL/hr Last Rate: 50 mL/hr (02/22/20 0811)         Invasive Devices:    Invasive Devices     Peripheral Intravenous Line            Peripheral IV 02/20/20 Left Antecubital 1 day    Peripheral IV 02/21/20 Left;Ventral (anterior) Hand 1 day    Peripheral IV 02/22/20 Right Wrist less than 1 day          Drain            Urethral Catheter Temperature probe less than 1 day                  LABORATORY:    Results from last 7 days   Lab Units 02/22/20  0501 02/21/20  1150 02/21/20  0945 02/21/20  0850 02/21/20  0429 02/20/20  1804   WBC Thousand/uL 12 32* 8 65 7 82  --  9 21 7 85   HEMOGLOBIN g/dL 9 7* 9 2* 9 8*  --  11 1* 10 4*   I STAT HEMOGLOBIN g/dl  --   --   --  10 2*  --   --    HEMATOCRIT % 30 1* 27 2* 29 0*  --  34 1* 32 6*   HEMATOCRIT, ISTAT %  --   --   --  30*  --   --    PLATELETS Thousands/uL 249 253 261  --  271 235   POTASSIUM mmol/L 4 9 4 8 5 4*  --  4 6 5 6*   CHLORIDE mmol/L 100 100 100  --  101 101   CO2 mmol/L 25 24 24  --  27 29   CO2, I-STAT mmol/L  --   --   --  24  --   --    BUN mg/dL 54* 50* 50*  --  52* 53*   CREATININE mg/dL 16 20* 15 20* 15 82*  --  15 44* 15 32*   CALCIUM mg/dL 8 6 8 9 9 0  --  9 0 8  7   MAGNESIUM mg/dL  --  2 8* 2 8*  --   --  2 5   GLUCOSE, ISTAT mg/dl  --   --   --  275*  --   --       rest all reviewed    RADIOLOGY:  No orders to display     Rest all reviewed    Portions of the record may have been created with voice recognition software  Occasional wrong word or "sound a like" substitutions may have occurred due to the inherent limitations of voice recognition software  Read the chart carefully and recognize, using context, where substitutions have occurred  If you have any questions, please contact the dictating provider

## 2020-02-22 NOTE — PROGRESS NOTES
Progress Note - Neurology   Asim Sarai Mckeon 37 y o  male MRN: [id number]  Unit/Bed#: ICU 02 Encounter: [id number]    Assessment:  Suspected status epilepticus - resolved  Hypertension  Respiratory failure - extubated successfully overnight  ESRD  DM  Hx of prior CVA    Plan:  -  Continued ICU supportive care, the patient was extubated and following commands, today, blood pressure control per ICU team, as well as nausea and vomiting  -  Video EEG in place will review with epilepsy team, on Depakote 750 mg every 8 hours IV, continue, Depakote level 37  -  Reviewed prior imaging, no need for further imaging at this time, 2/20 - mri of brain no evidence of acute infarct or intracranial hemorrhage  -  Seizure precautions, notify neurology with any changes in examination  -  Frequent neurological checks  -  Secondary stroke prevention with history of strokes    Subjective:   Reviewed ICU daily note from today -    Per record review the patient is a 47-year-old male with type 1 diabetes - complicated with nephropathy, gastroparesis, retinopathy and neuropathy, history of prior CVA, end-stage treated sees on PD, hypertension, dyslipidemia  The patient is on the pancreatic/renal transplant list at [facility]  The patient presented to [facility] to 20 with vertigo and lightheadedness for 2 days intermittently he also had word-finding difficulties but no slurred speech, occipital headache and nausea  The patient was noted to have seizure activity, as described in my HPI/consult by Neurology - the patient was loaded with Ativan, as well as given Depacon and started on maintenance dosing of Depakote, the patient was intubated and transferred [place] [facility] [place] [facility] [place] continues to be EEG monitoring      The patient was seen by neurology yesterday - the patient was to be placed on video EEG monitoring, secondary to Depakote level being low he was given another bolus, and increased dosing to 750 mg every 8 hours, on sedating medications  2-22 the patient was noted to have elevated blood pressures overnight, given multiple doses of medications  It was reported the patient to be awake and alert  The patient was extubated and is following commands  Vitals: Blood pressure (!) 197/98, pulse 80, temperature 98 8 °F (37 1 °C), temperature source Oral, resp  rate 20, height 5' 11" (1 803 m), weight 103 kg (226 lb 13 7 oz), SpO2 99 %  ,Body mass index is 31 64 kg/m²         Current Facility-Administered Medications:  acetaminophen 650 mg Oral Q6H PRN Dellia Arminto, CRNP    aspirin 81 mg Oral Daily Dellia Arminto, CRNP    atorvastatin 40 mg Oral HS Dellia Arminto, CRNP    calcium acetate 667 mg Oral TID With Meals Dellia Arminto, CRNP    captopril 50 mg Oral BID Favio Banai, DO    chlorhexidine 15 mL Swish & Spit Q12H [address] Dellia Arminto, CRNP    cholecalciferol 2,000 Units Oral Daily Dellia Arminto, CRNP    cinacalcet 90 mg Oral Daily Dellia Arminto, CRNP    cloNIDine 0 1 mg Oral TID Dellia Arminto, CRNP    famotidine 20 mg Oral HS Dellia Arminto, CRNP    fentaNYL 25 mcg/hr Intravenous Continuous Catherne Runner, MD Last Rate: Stopped (02/21/20 1700)   gabapentin 100 mg Oral HS Dellia Arminto, CRNP    gentamicin 1 application Topical Daily Dellia Arminto, CRNP    heparin (porcine) 5,000 Units Subcutaneous [facility] DEISI Casas    hydrALAZINE 50 mg Oral TID Favio Banai, DO    insulin regular (HumuLIN R,NovoLIN R) infusion 0 3-21 Units/hr Intravenous Titrated Delanaa Arminto, CRNP Last Rate: 2 Units/hr (02/22/20 0840)   labetalol 150 mg Oral Q12H [address] Favio Banai, DO    Labetalol HCl 20 mg Intravenous Q2H PRN Favio Banai, DO    lactobacillus acidophilus-bulgaricus 1 packet Oral HS DEISI Casas    niCARdipine 1-15 mg/hr Intravenous Titrated Favio Banai, DO Last Rate: 6 mg/hr (02/22/20 0853)   ondansetron 8 mg Intravenous Q6H PRN Favio Renner DO    propofol 5-50 mcg/kg/min Intravenous Titrated Niurka Vaughan MD Last Rate: Stopped (02/21/20 9312)   dextrose 10 % and sodium chloride 0 9 % 50 mL/hr Intravenous Continuous Dayne A Paster, DO Last Rate: 50 mL/hr (02/22/20 9832)   torsemide 100 mg Oral Daily With Lunch Faviomarisa Renner, DO    valproate sodium 750 mg Intravenous Q8H [address] Favio Banai, DO Last Rate: Stopped (02/22/20 0501)       Physical Exam:     Constitutional - in NAD, tired appearing  HEENT - NC/AT, extubated  Cardiac - No murmur noted, rate regular  Lungs - CTAB  Abdomen - ND  Extremities - No edema noted  Skin - no rashes noted    Neurological    Mental status - the patient is awake, drowsy, he is following commands, he is able to tell me his name, he is able to track to examiner, he is able to move uppers and lowers to commands, he falls back to sleep easily    Cranial nerves 2 through 12 are intact, no facial droop, tracks side to side to examiner, pupils are equal and reactive, no gaze preference of palsy noted  Motor - 4/5 uppers and lowers, able to lift off bed uppers and lowers with slow drift to bed bilaterally, no focal weakness seen  Myoclonus noted w outstretched hands, with slow drift to bed  Sensation - nonfocal to touch, grossly, no gross neglect noted  Toes are upgoing bilaterally    Lab, Imaging and other studies:   I have personally reviewed pertinent reports    , CBC:   Results from last 7 days   Lab Units 02/22/20  0501 02/21/20  1150 02/21/20  0945   WBC Thousand/uL 12 32* 8 65 7 82   RBC Million/uL 3 09* 2 82* 3 02*   HEMOGLOBIN g/dL 9 7* 9 2* 9 8*   HEMATOCRIT % 30 1* 27 2* 29 0*   MCV fL 97 97 96   PLATELETS Thousands/uL 249 253 261   , BMP/CMP:   Results from last 7 days   Lab Units 02/22/20  0501 02/21/20  1150 02/21/20  0945 02/21/20  0850  02/20/20  1804   SODIUM mmol/L 139 137 142  --    < > 143   POTASSIUM mmol/L 4 9 4 8 5 4*  --    < > 5 6*   CHLORIDE mmol/L 100 100 100  --    < > 101   CO2 mmol/L 25 24 24  --    < > 29   CO2, I-STAT mmol/L  --   --   --  24  --   --    BUN mg/dL 54* 50* 50*  --    < > 53*   CREATININE mg/dL 16 20* 15 20* 15 82*  --    < > 15 32*   GLUCOSE, ISTAT mg/dl  --   --   --  275*  --   --    CALCIUM mg/dL 8 6 8 9 9 0  --    < > 8 7   AST U/L 23  --   --   --   --  20   ALT U/L 16  --   --   --   --  22   ALK PHOS U/L 65  --   --   --   --  77   EGFR ml/min/1 73sq m 3 4 3  --    < > 4    < > = values in this interval not displayed  , Vitamin B12:   , HgBA1C:   Results from last 7 days   Lab Units 02/21/20  0429   HEMOGLOBIN A1C % 5 6   , TSH:   , Coagulation:   Results from last 7 days   Lab Units 02/20/20  1804   INR  1 02   , Lipid Profile:   Results from last 7 days   Lab Units 02/21/20  0429   HDL mg/dL 38*   LDL CALC mg/dL 71   TRIGLYCERIDES mg/dL 93   , Ammonia:   , Urinalysis:       Invalid input(s): URIBILINOGEN, Drug Screen:   , Medication Drug Levels:   Results from last 7 days   Lab Units 02/22/20  0501 02/21/20  1150 02/21/20  0945   VALPROIC ACID TOTAL ug/mL 37* 36* 71     Procedure: Xr Chest Portable    Result Date: 2/21/2020  Narrative: CHEST INDICATION:   intubation- tube placement  COMPARISON:  Chest radiograph from 12/7/2019  EXAM PERFORMED/VIEWS:  XR CHEST PORTABLE FINDINGS:  ET tube 5 cm above the julia  Cardiomediastinal silhouette appears unremarkable  Loop recorder in the left anterior chest wall  The lungs are clear  No pneumothorax or pleural effusion  Osseous structures appear within normal limits for patient age  Impression: No acute cardiopulmonary disease  ET tube 5 cm above the julia  Workstation performed: [id number]     Procedure: Ct Head Without Contrast    Result Date: 2/20/2020  Narrative: CT BRAIN - WITHOUT CONTRAST INDICATION:   Vertigo  COMPARISON:  12/9/2019  TECHNIQUE:  CT examination of the brain was performed  In addition to axial images, coronal 2D reformatted images were created and submitted for interpretation    Radiation dose length product (DLP) for this visit:  071 mGy-cm   This examination, like all CT scans performed in the [facility], was performed utilizing techniques to minimize radiation dose exposure, including the use of iterative reconstruction and automated exposure control  IMAGE QUALITY:  Diagnostic  FINDINGS: PARENCHYMA:  Periventricular and subcortical hypoattenuating foci consistent with microangiopathic disease No acute intracranial hemorrhage or mass effect  VENTRICLES AND EXTRA-AXIAL SPACES:  No hydrocephalus or extra-axial collection  VISUALIZED ORBITS AND PARANASAL SINUSES:  No retro-orbital mass or proptosis  Mild mucosal thickening in the right posterior ethmoid air cells and maxillary sinuses, likely chronic  CALVARIUM AND EXTRACRANIAL SOFT TISSUES:  No lytic or blastic lesion or soft tissue mass  Impression: No acute intracranial abnormality  Workstation performed: [id number]     Procedure: Mri Brain Wo Contrast    Result Date: 2/21/2020  Narrative: MRI BRAIN WITHOUT CONTRAST INDICATION: Dizziness  COMPARISON:   12/9/2019 and 2/20/2020  TECHNIQUE:  Sagittal T1, axial T2, axial FLAIR, axial T1, axial [place] and axial diffusion imaging  IMAGE QUALITY:  Diagnostic  FINDINGS: BRAIN PARENCHYMA: Chronic lacunar infarcts in the left centrum semiovale and left thalamus  Previously demonstrated punctate infarct in the posterior aspect of the velia is no longer visualized  No restricted diffusion to suggest an acute infarct  Periventricular and subcortical T2/FLAIR hyperintense foci consistent with microangiopathic disease  No intracranial hemorrhage or mass effect  VENTRICLES: Prominent perivascular spaces in the basal ganglia  Stable enlargement of the ventricles and sulci, consistent with volume loss, significantly advanced for age  SELLA AND PITUITARY GLAND:  Normal  ORBITS:  Intact globes and orbits  PARANASAL SINUSES:  Mucosal thickening throughout the ethmoid air cells and maxillary sinuses   VASCULATURE: Evaluation of the major intracranial vasculature demonstrates appropriate flow voids  CALVARIUM AND SKULL BASE:  No osseous lesion  EXTRACRANIAL SOFT TISSUES:  No soft tissue mass  Impression: No evidence of acute infarct or intracranial hemorrhage   Workstation performed: [id number]

## 2020-02-22 NOTE — RESPIRATORY THERAPY NOTE
RT Protocol Note  Trent Goode 37 y o  male MRN: [id number]  Unit/Bed#: ICU 02 Encounter: [id number]    Assessment    Principal Problem:    Seizure-like activity ([place] [address] )  Active Problems:    Type 1 diabetes mellitus with hypoglycemia ([place] [address] )    History of lacunar cerebrovascular accident (CVA)    Homozygous MTHFR mutation [id number] ([place] [address] )    End-stage renal disease on peritoneal dialysis ([place] [address] )    Renovascular hypertension    Dyslipidemia      Home Pulmonary Medications:  none       Past Medical History:   Diagnosis Date    Acute kidney injury ([place] [address] )     Anxiety     Cerebellar stroke side undetermined 2015,2018    Diabetes type 1, controlled ([place] [address] )     IDDM    Diarrhea     Gastroparesis     History of shingles     History of transfusion 2018    Hypertension     Muscle weakness     general unsteadiness    Retinopathy      Social History     Socioeconomic History    Marital status: Single     Spouse name: None    Number of children: None    Years of education: None    Highest education level: None   Occupational History    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   Social Needs    Financial resource strain: None    Food insecurity:     Worry: None     Inability: None    Transportation needs:     Medical: None     Non-medical: None   Tobacco Use    Smoking status: Former Smoker     Packs/day: 0 50     Years: 12 00     Pack years: 6 00     Types: Cigarettes     Last attempt to quit: 2018     Years since quittin 0    Smokeless tobacco: Never Used    Tobacco comment: quit 2018   Substance and Sexual Activity    Alcohol use: Not Currently     Comment: rarely    Drug use: Yes     Frequency: 5 0 times per week     Types: Marijuana     Comment: medical    Sexual activity: None   Lifestyle    Physical activity:     Days per week: None     Minutes per session: None    Stress: None   Relationships    Social connections:     Talks on phone: None     Gets together: None     Attends Scientologist service: None     Active member of club or organization: None     Attends meetings of clubs or organizations: None     Relationship status: None    Intimate partner violence:     Fear of current or ex partner: None     Emotionally abused: None     Physically abused: None     Forced sexual activity: None   Other Topics Concern    None   Social History Narrative    Caffeine use    single       Subjective         Objective    Physical Exam:   Assessment Type: (P) Assess only  General Appearance: (P) Sedated  Respiratory Pattern: (P) Assisted  Chest Assessment: (P) Chest expansion symmetrical  Bilateral Breath Sounds: (P) Clear, Diminished    Vitals:  Blood pressure 140/80, pulse 75, temperature 98 4 °F (36 9 °C), temperature source Oral, resp  rate 18, height 5' 11" (1 803 m), weight 96 7 kg (213 lb 3 oz), SpO2 100 %  Results from last 7 days   Lab Units 02/21/20  1149   PH ART  7 454*   PCO2 ART mm Hg 33 2*   PO2 ART mm Hg 170 0*   HCO3 ART mmol/L 22 8   BASE EXC ART mmol/L -0 8   O2 CONTENT ART mL/dL 13 5*   O2 HGB, ARTERIAL % 98 4*   ABG SOURCE  Radial, Right   MOODY TEST  Yes       Imaging and other studies: I have personally reviewed pertinent reports              Plan    Respiratory Plan: (P) Vent/NIV/HFNC        Resp Comments: pt resting comfortably on ac vent settings will continue to monitor

## 2020-02-23 LAB
ANION GAP SERPL CALCULATED.3IONS-SCNC: 14 MMOL/L (ref 4–13)
BASOPHILS # BLD AUTO: 0.08 THOUSANDS/ΜL (ref 0–0.1)
BASOPHILS NFR BLD AUTO: 1 % (ref 0–1)
BUN SERPL-MCNC: 52 MG/DL (ref 5–25)
CALCIUM SERPL-MCNC: 9.2 MG/DL (ref 8.3–10.1)
CHLORIDE SERPL-SCNC: 103 MMOL/L (ref 100–108)
CO2 SERPL-SCNC: 24 MMOL/L (ref 21–32)
CREAT SERPL-MCNC: 15.4 MG/DL (ref 0.6–1.3)
EOSINOPHIL # BLD AUTO: 0.34 THOUSAND/ΜL (ref 0–0.61)
EOSINOPHIL NFR BLD AUTO: 3 % (ref 0–6)
ERYTHROCYTE [DISTWIDTH] IN BLOOD BY AUTOMATED COUNT: 15.1 % (ref 11.6–15.1)
GFR SERPL CREATININE-BSD FRML MDRD: 4 ML/MIN/1.73SQ M
GLUCOSE SERPL-MCNC: 115 MG/DL (ref 65–140)
GLUCOSE SERPL-MCNC: 138 MG/DL (ref 65–140)
GLUCOSE SERPL-MCNC: 158 MG/DL (ref 65–140)
GLUCOSE SERPL-MCNC: 170 MG/DL (ref 65–140)
GLUCOSE SERPL-MCNC: 188 MG/DL (ref 65–140)
GLUCOSE SERPL-MCNC: 200 MG/DL (ref 65–140)
GLUCOSE SERPL-MCNC: 210 MG/DL (ref 65–140)
GLUCOSE SERPL-MCNC: 211 MG/DL (ref 65–140)
GLUCOSE SERPL-MCNC: 222 MG/DL (ref 65–140)
GLUCOSE SERPL-MCNC: 223 MG/DL (ref 65–140)
GLUCOSE SERPL-MCNC: 228 MG/DL (ref 65–140)
GLUCOSE SERPL-MCNC: 230 MG/DL (ref 65–140)
GLUCOSE SERPL-MCNC: 72 MG/DL (ref 65–140)
GLUCOSE SERPL-MCNC: 79 MG/DL (ref 65–140)
GLUCOSE SERPL-MCNC: 97 MG/DL (ref 65–140)
HCT VFR BLD AUTO: 29.2 % (ref 36.5–49.3)
HGB BLD-MCNC: 9.5 G/DL (ref 12–17)
IMM GRANULOCYTES # BLD AUTO: 0.06 THOUSAND/UL (ref 0–0.2)
IMM GRANULOCYTES NFR BLD AUTO: 1 % (ref 0–2)
LYMPHOCYTES # BLD AUTO: 1.55 THOUSANDS/ΜL (ref 0.6–4.47)
LYMPHOCYTES NFR BLD AUTO: 14 % (ref 14–44)
MCH RBC QN AUTO: 31.7 PG (ref 26.8–34.3)
MCHC RBC AUTO-ENTMCNC: 32.5 G/DL (ref 31.4–37.4)
MCV RBC AUTO: 97 FL (ref 82–98)
MONOCYTES # BLD AUTO: 0.68 THOUSAND/ΜL (ref 0.17–1.22)
MONOCYTES NFR BLD AUTO: 6 % (ref 4–12)
NEUTROPHILS # BLD AUTO: 8.2 THOUSANDS/ΜL (ref 1.85–7.62)
NEUTS SEG NFR BLD AUTO: 75 % (ref 43–75)
NRBC BLD AUTO-RTO: 0 /100 WBCS
PLATELET # BLD AUTO: 254 THOUSANDS/UL (ref 149–390)
PMV BLD AUTO: 9.7 FL (ref 8.9–12.7)
POTASSIUM SERPL-SCNC: 3.7 MMOL/L (ref 3.5–5.3)
RBC # BLD AUTO: 3 MILLION/UL (ref 3.88–5.62)
SODIUM SERPL-SCNC: 141 MMOL/L (ref 136–145)
VALPROATE SERPL-MCNC: 38 UG/ML (ref 50–100)
WBC # BLD AUTO: 10.91 THOUSAND/UL (ref 4.31–10.16)

## 2020-02-23 PROCEDURE — 95720 EEG PHY/QHP EA INCR W/VEEG: CPT | Performed by: PSYCHIATRY & NEUROLOGY

## 2020-02-23 PROCEDURE — 99233 SBSQ HOSP IP/OBS HIGH 50: CPT | Performed by: ANESTHESIOLOGY

## 2020-02-23 PROCEDURE — 80164 ASSAY DIPROPYLACETIC ACD TOT: CPT | Performed by: INTERNAL MEDICINE

## 2020-02-23 PROCEDURE — 99232 SBSQ HOSP IP/OBS MODERATE 35: CPT | Performed by: PSYCHIATRY & NEUROLOGY

## 2020-02-23 PROCEDURE — 99233 SBSQ HOSP IP/OBS HIGH 50: CPT | Performed by: INTERNAL MEDICINE

## 2020-02-23 PROCEDURE — 85025 COMPLETE CBC W/AUTO DIFF WBC: CPT | Performed by: INTERNAL MEDICINE

## 2020-02-23 PROCEDURE — 80048 BASIC METABOLIC PNL TOTAL CA: CPT | Performed by: INTERNAL MEDICINE

## 2020-02-23 PROCEDURE — 3E1M39Z IRRIGATION OF PERITONEAL CAVITY USING DIALYSATE, PERCUTANEOUS APPROACH: ICD-10-PCS | Performed by: INTERNAL MEDICINE

## 2020-02-23 PROCEDURE — 82948 REAGENT STRIP/BLOOD GLUCOSE: CPT

## 2020-02-23 RX ORDER — HALOPERIDOL 5 MG/ML
2 INJECTION INTRAMUSCULAR ONCE
Status: COMPLETED | OUTPATIENT
Start: 2020-02-23 | End: 2020-02-23

## 2020-02-23 RX ORDER — HALOPERIDOL 5 MG/ML
2 INJECTION INTRAMUSCULAR DAILY PRN
Status: DISCONTINUED | OUTPATIENT
Start: 2020-02-23 | End: 2020-02-23

## 2020-02-23 RX ORDER — HALOPERIDOL 5 MG/ML
2 INJECTION INTRAMUSCULAR DAILY PRN
Status: ACTIVE | OUTPATIENT
Start: 2020-02-23 | End: 2020-02-25

## 2020-02-23 RX ORDER — ACETAMINOPHEN 325 MG/1
650 TABLET ORAL EVERY 8 HOURS PRN
Status: DISCONTINUED | OUTPATIENT
Start: 2020-02-23 | End: 2020-02-28 | Stop reason: HOSPADM

## 2020-02-23 RX ORDER — ERYTHROMYCIN 250 MG/1
250 TABLET, COATED ORAL
Status: DISPENSED | OUTPATIENT
Start: 2020-02-23 | End: 2020-02-24

## 2020-02-23 RX ADMIN — VALPROATE SODIUM 750 MG: 100 INJECTION, SOLUTION INTRAVENOUS at 04:40

## 2020-02-23 RX ADMIN — GABAPENTIN 100 MG: 100 CAPSULE ORAL at 21:16

## 2020-02-23 RX ADMIN — FAMOTIDINE 20 MG: 20 TABLET ORAL at 21:16

## 2020-02-23 RX ADMIN — HYDRALAZINE HYDROCHLORIDE 20 MG: 20 INJECTION INTRAMUSCULAR; INTRAVENOUS at 00:13

## 2020-02-23 RX ADMIN — SODIUM CHLORIDE 10 MG/HR: 0.9 INJECTION, SOLUTION INTRAVENOUS at 03:26

## 2020-02-23 RX ADMIN — LABETALOL HYDROCHLORIDE 150 MG: 100 TABLET, FILM COATED ORAL at 21:15

## 2020-02-23 RX ADMIN — ONDANSETRON 8 MG: 2 INJECTION INTRAMUSCULAR; INTRAVENOUS at 18:17

## 2020-02-23 RX ADMIN — CHLORHEXIDINE GLUCONATE 0.12% ORAL RINSE 15 ML: 1.2 LIQUID ORAL at 21:15

## 2020-02-23 RX ADMIN — HEPARIN SODIUM 5000 UNITS: 5000 INJECTION INTRAVENOUS; SUBCUTANEOUS at 14:54

## 2020-02-23 RX ADMIN — SODIUM CHLORIDE 10 MG/HR: 0.9 INJECTION, SOLUTION INTRAVENOUS at 08:30

## 2020-02-23 RX ADMIN — ACETAMINOPHEN 650 MG: 650 SUSPENSION ORAL at 04:29

## 2020-02-23 RX ADMIN — HYDRALAZINE HYDROCHLORIDE 50 MG: 50 TABLET ORAL at 08:36

## 2020-02-23 RX ADMIN — ONDANSETRON 8 MG: 2 INJECTION INTRAMUSCULAR; INTRAVENOUS at 13:24

## 2020-02-23 RX ADMIN — SODIUM CHLORIDE 3 UNITS/HR: 9 INJECTION, SOLUTION INTRAVENOUS at 12:32

## 2020-02-23 RX ADMIN — HEPARIN SODIUM 5000 UNITS: 5000 INJECTION INTRAVENOUS; SUBCUTANEOUS at 05:50

## 2020-02-23 RX ADMIN — HYDRALAZINE HYDROCHLORIDE 50 MG: 50 TABLET ORAL at 21:16

## 2020-02-23 RX ADMIN — ONDANSETRON 8 MG: 2 INJECTION INTRAMUSCULAR; INTRAVENOUS at 00:29

## 2020-02-23 RX ADMIN — LABETALOL HYDROCHLORIDE 150 MG: 100 TABLET, FILM COATED ORAL at 08:36

## 2020-02-23 RX ADMIN — SODIUM CHLORIDE 50 ML/HR: 234 INJECTION INTRAMUSCULAR; INTRAVENOUS; SUBCUTANEOUS at 16:00

## 2020-02-23 RX ADMIN — ERYTHROMYCIN 250 MG: 250 TABLET, FILM COATED ORAL at 16:10

## 2020-02-23 RX ADMIN — GENTAMICIN SULFATE 1 APPLICATION: 1 OINTMENT TOPICAL at 21:32

## 2020-02-23 RX ADMIN — HALOPERIDOL LACTATE 2 MG: 5 INJECTION INTRAMUSCULAR at 10:21

## 2020-02-23 RX ADMIN — LABETALOL 20 MG/4 ML (5 MG/ML) INTRAVENOUS SYRINGE 20 MG: at 16:00

## 2020-02-23 RX ADMIN — VALPROATE SODIUM 750 MG: 100 INJECTION, SOLUTION INTRAVENOUS at 21:15

## 2020-02-23 RX ADMIN — HYDRALAZINE HYDROCHLORIDE 20 MG: 20 INJECTION INTRAMUSCULAR; INTRAVENOUS at 08:27

## 2020-02-23 RX ADMIN — HEPARIN SODIUM 5000 UNITS: 5000 INJECTION INTRAVENOUS; SUBCUTANEOUS at 21:16

## 2020-02-23 RX ADMIN — HYDRALAZINE HYDROCHLORIDE 50 MG: 50 TABLET ORAL at 16:10

## 2020-02-23 RX ADMIN — LABETALOL 20 MG/4 ML (5 MG/ML) INTRAVENOUS SYRINGE 20 MG: at 12:38

## 2020-02-23 RX ADMIN — ACETAMINOPHEN 650 MG: 325 TABLET ORAL at 16:10

## 2020-02-23 RX ADMIN — HYDRALAZINE HYDROCHLORIDE 20 MG: 20 INJECTION INTRAMUSCULAR; INTRAVENOUS at 14:54

## 2020-02-23 RX ADMIN — SODIUM CHLORIDE 7 MG/HR: 0.9 INJECTION, SOLUTION INTRAVENOUS at 14:17

## 2020-02-23 RX ADMIN — ONDANSETRON 8 MG: 2 INJECTION INTRAMUSCULAR; INTRAVENOUS at 05:36

## 2020-02-23 RX ADMIN — ASPIRIN 81 MG 81 MG: 81 TABLET ORAL at 08:36

## 2020-02-23 RX ADMIN — VALPROATE SODIUM 750 MG: 100 INJECTION, SOLUTION INTRAVENOUS at 12:35

## 2020-02-23 NOTE — PLAN OF CARE

## 2020-02-23 NOTE — PROGRESS NOTES
Progress Note - Neurology   Jose Mckeon 37 y o  male MRN: [id number]  Unit/Bed#: ICU 02 Encounter: [id number]    Assessment:  Seizure-like activity suspected status epilepticus resolved  Hypertension  Dyslipidemia  End-stage renal disease on peritoneal dialysis  DM  Nausea and vomiting - intractable  Hx of prior stroke    Plan:  -  continued ICU supportive care, continue to monitor for infectious and metabolic derangements, blood pressure control per ICU team, continues with intractable nausea and vomiting  -  video EEG monitoring, will review with team overnight findings  -  MRI brain without contrast completed which showed no acute infarction or intracranial hemorrhage, this was completed on 2-21  -  currently on Depacon 750 mg every 8 hours, depakote level was, 38, when taking po can change to oral medications  -  Seizure precautions, delirium precautions    -  Reviewed and rounded with attended, plan of care as outlined above  Subjective:   Per ICU chart review 24 hour events, episode of confusion overnight without myoclonic activity resolved by time of physician evaluation, nonfocal examination reported  The patient continues to have nausea and vomiting intractable  ICU team as following  ROS:  He denies any headache problem vision are one-sided weakness or numbness, he does have intractable nausea and vomiting  Vitals: Blood pressure 153/67, pulse 100, temperature 99 5 °F (37 5 °C), temperature source Oral, resp  rate 15, height 5' 11" (1 803 m), weight 103 kg (226 lb 13 7 oz), SpO2 98 %  ,Body mass index is 31 64 kg/m²       Current Facility-Administered Medications:  acetaminophen 650 mg Oral Q6H PRN Loraine Catching, CRNP    aspirin 81 mg Oral Daily Loraine Catching, CRNP    atorvastatin 40 mg Oral HS Loraine Catching, CRNP    calcium acetate 667 mg Oral TID With Meals Loraine Catching, CRNP    chlorhexidine 15 mL Swish & Spit Q12H [facility] Loraine Catching, CRNP    cholecalciferol 2,000 Units Oral Daily Doren Hali, CRNP    cinacalcet 90 mg Oral Daily Doredu Contrerasi, CRNP    cloNIDine 0 2 mg Transdermal Weekly Favio Banai, DO    erythromycin base 250 mg Oral TID AC Favio Banai, DO    famotidine 20 mg Oral HS Doren Hali, CRNP    gabapentin 100 mg Oral HS Doren Hali, CRNP    gentamicin 1 application Topical Daily Doren Hali, CRNP    heparin (porcine) 5,000 Units Subcutaneous [facility] DEISI Casas    hydrALAZINE 20 mg Intravenous Q4H PRN Favio Banai, DO    hydrALAZINE 50 mg Oral TID Favio Banai, DO    insulin regular (HumuLIN R,NovoLIN R) infusion 0 3-21 Units/hr Intravenous Titrated DEISI Velasco Last Rate: 1 5 Units/hr (02/23/20 0558)   labetalol 150 mg Oral Q12H [address] Favio Banai, DO    Labetalol HCl 20 mg Intravenous Q2H PRN Favio Banai, DO    lactobacillus acidophilus-bulgaricus 1 packet Oral HS DEISI Velasco    niCARdipine 1-15 mg/hr Intravenous Titrated Richard Wood MD Last Rate: 10 mg/hr (02/23/20 0326)   ondansetron 8 mg Intravenous Q6H PRN Favio Banai, DO Last Rate: 8 mg (02/23/20 0536)   dextrose 10 % and sodium chloride 0 9 % 50 mL/hr Intravenous Continuous Dayne A Paster, DO Last Rate: 50 mL/hr (02/22/20 1937)   torsemide 100 mg Oral Daily With Lunch Favio Banai, DO    valproate sodium 750 mg Intravenous Q8H [address] Favio Banai, DO Last Rate: 750 mg (02/23/20 0440)     Physical Exam:     Vital signs reviewed  Constitutional - he appears in to slight distress, at times he appears in discomfort from nausea  HEENT - NC/AT, video EEG wires are in place  Lungs - No respiratory distress noted  Abdomen - Non distended  Skin - no rashes noted    Neurological    Mental status - the patient is awake and alert, he is in mild distress secondary to nausea vomiting, however he is able to tell me his name, place, and year  He has decreased attention and concentration, he is able to read and repeat and follow simple commands     No evidence of aphasia or dysarthria at this time  Cranial nerves 2 through 12 are intact - pupils are equal and reactive, extraocular movements intact without any gaze preference or palsy, no facial droop noted, tongue was midline  Motor - he was observed moving his upper and lower extremities equally, he was able to left his upper extremities at least antigravity 4/5 with slow drip today, he was able to wiggle his toes, and move legs equally off bed - observed  No focal weakness noted to examination  Myoclonic twitching noted  Sensation - nonfocal to touch    Toes are upgoing bilaterally    Lab, Imaging and other studies:   I have personally reviewed pertinent reports  , CBC:   Results from last 7 days   Lab Units 02/23/20  0437 02/22/20  0501 02/21/20  1150   WBC Thousand/uL 10 91* 12 32* 8 65   RBC Million/uL 3 00* 3 09* 2 82*   HEMOGLOBIN g/dL 9 5* 9 7* 9 2*   HEMATOCRIT % 29 2* 30 1* 27 2*   MCV fL 97 97 97   PLATELETS Thousands/uL 254 249 253   , BMP/CMP:   Results from last 7 days   Lab Units 02/23/20  0437 02/22/20  0501 02/21/20  1150  02/21/20  0850  02/20/20  1804   SODIUM mmol/L 141 139 137   < >  --    < > 143   POTASSIUM mmol/L 3 7 4 9 4 8   < >  --    < > 5 6*   CHLORIDE mmol/L 103 100 100   < >  --    < > 101   CO2 mmol/L 24 25 24   < >  --    < > 29   CO2, I-STAT mmol/L  --   --   --   --  24  --   --    BUN mg/dL 52* 54* 50*   < >  --    < > 53*   CREATININE mg/dL 15 40* 16 20* 15 20*   < >  --    < > 15 32*   GLUCOSE, ISTAT mg/dl  --   --   --   --  275*  --   --    CALCIUM mg/dL 9 2 8 6 8 9   < >  --    < > 8 7   AST U/L  --  23  --   --   --   --  20   ALT U/L  --  16  --   --   --   --  22   ALK PHOS U/L  --  65  --   --   --   --  77   EGFR ml/min/1 73sq m 4 3 4   < >  --    < > 4    < > = values in this interval not displayed     , Vitamin B12:   , HgBA1C:   Results from last 7 days   Lab Units 02/21/20  0429   HEMOGLOBIN A1C % 5 6   , TSH:   , Coagulation:   Results from last 7 days   Lab Units 02/20/20  1804   INR  1 02   , Lipid Profile:   Results from last 7 days   Lab Units 02/21/20  0429   HDL mg/dL 38*   LDL CALC mg/dL 71   TRIGLYCERIDES mg/dL 93   , Ammonia:   , Urinalysis:       Invalid input(s): URIBILINOGEN, Drug Screen:   , Medication Drug Levels:   Results from last 7 days   Lab Units 02/23/20  0437 02/22/20  0501 02/21/20  1150   VALPROIC ACID TOTAL ug/mL 38* 37* 36*     Procedure: Xr Chest Portable    Result Date: 2/21/2020  Narrative: CHEST INDICATION:   intubation- tube placement  COMPARISON:  Chest radiograph from 12/7/2019  EXAM PERFORMED/VIEWS:  XR CHEST PORTABLE FINDINGS:  ET tube 5 cm above the julia  Cardiomediastinal silhouette appears unremarkable  Loop recorder in the left anterior chest wall  The lungs are clear  No pneumothorax or pleural effusion  Osseous structures appear within normal limits for patient age  Impression: No acute cardiopulmonary disease  ET tube 5 cm above the julia  Workstation performed: [id number]     Procedure: Ct Head Without Contrast    Result Date: 2/20/2020  Narrative: CT BRAIN - WITHOUT CONTRAST INDICATION:   Vertigo  COMPARISON:  12/9/2019  TECHNIQUE:  CT examination of the brain was performed  In addition to axial images, coronal 2D reformatted images were created and submitted for interpretation  Radiation dose length product (DLP) for this visit:  917 mGy-cm   This examination, like all CT scans performed in the [facility], was performed utilizing techniques to minimize radiation dose exposure, including the use of iterative reconstruction and automated exposure control  IMAGE QUALITY:  Diagnostic  FINDINGS: PARENCHYMA:  Periventricular and subcortical hypoattenuating foci consistent with microangiopathic disease No acute intracranial hemorrhage or mass effect  VENTRICLES AND EXTRA-AXIAL SPACES:  No hydrocephalus or extra-axial collection   VISUALIZED ORBITS AND PARANASAL SINUSES:  No retro-orbital mass or proptosis  Mild mucosal thickening in the right posterior ethmoid air cells and maxillary sinuses, likely chronic  CALVARIUM AND EXTRACRANIAL SOFT TISSUES:  No lytic or blastic lesion or soft tissue mass  Impression: No acute intracranial abnormality  Workstation performed: [id number]     Procedure: Mri Brain Wo Contrast    Result Date: 2/21/2020  Narrative: MRI BRAIN WITHOUT CONTRAST INDICATION: Dizziness  COMPARISON:   12/9/2019 and 2/20/2020  TECHNIQUE:  Sagittal T1, axial T2, axial FLAIR, axial T1, axial [place] and axial diffusion imaging  IMAGE QUALITY:  Diagnostic  FINDINGS: BRAIN PARENCHYMA: Chronic lacunar infarcts in the left centrum semiovale and left thalamus  Previously demonstrated punctate infarct in the posterior aspect of the velia is no longer visualized  No restricted diffusion to suggest an acute infarct  Periventricular and subcortical T2/FLAIR hyperintense foci consistent with microangiopathic disease  No intracranial hemorrhage or mass effect  VENTRICLES: Prominent perivascular spaces in the basal ganglia  Stable enlargement of the ventricles and sulci, consistent with volume loss, significantly advanced for age  SELLA AND PITUITARY GLAND:  Normal  ORBITS:  Intact globes and orbits  PARANASAL SINUSES:  Mucosal thickening throughout the ethmoid air cells and maxillary sinuses  VASCULATURE:  Evaluation of the major intracranial vasculature demonstrates appropriate flow voids  CALVARIUM AND SKULL BASE:  No osseous lesion  EXTRACRANIAL SOFT TISSUES:  No soft tissue mass  Impression: No evidence of acute infarct or intracranial hemorrhage   Workstation performed: [id number]

## 2020-02-23 NOTE — PROGRESS NOTES
Progress Note - Critical Care   Myke Grazyna Mckeon 37 y o  male MRN: [id number]  Unit/Bed#: ICU 02 Encounter: [id number]    Assessment:   Principal Problem:    Seizure-like activity ([facility] )  Active Problems:    Type 1 diabetes mellitus with hypoglycemia ([facility] )    History of lacunar cerebrovascular accident (CVA)    Homozygous MTHFR mutation C677T ([facility] )    End-stage renal disease on peritoneal dialysis ([facility] )    Renovascular hypertension    Dyslipidemia  Resolved Problems:    * No resolved hospital problems  *      Plan:   · Neuro:   · Status epilepticus 2/2 unknown etiolology  · Continue depacon 750 mg q8, subtherapeutic this AM, maintain current dosing regiment as per neurology reccomendations  · Remains extubated, awake, alert, following commands, did have brief episode of confusion overnight without convulsive activity  · CV:   · Renovascular hypertension  · Continue double concentrated cardene drip, discontinue as able with resumption of home oral antihypertensives   · Restart home labetolol 150 mg Q12 hours, hydralazine 50 mg TID, clonidine 0 2 mg transdermal patch  · PRN labetalol and hydralazine   · Lung:   · No active issues  Patient tolerated extubation, remains on room air     · GI:   · Pepcid PO GI ppx  · Gastroparesis-continue erythromycin, zofran, advance diet as tolerated  · FEN:   · Remained NPO 2/2 nausea likely due to gastroparesis, ADAT  · Continue D10NS until able to tolerate diabetic diet  · Continued euvolemia on peritoneal dialysis   · Trend electrolytes      · :   · D/C'ed preston catheter, now on urinary retention protocol   · ESRD on peritoneal dialysis  · Nephrology following, appreciate recs  · Tolerated PD yesterday  · ID:   · No active issues  · Heme:   · No active issues  · Subcu heparin DVT ppx  · Trend hgb and plts  · Endo:   · T1 DM on insulin  · Continuing D10 NS, insulin drip, transition to SSI with basal coverage of lantus 12 units in AM and 15 units in PM, lispro 5 units with meals when able to tolerate diet  · Msk/Skin:   · PT/OT not applicable  · OOB  · Disposition: ICU    ______________________________________________________________________    HPI/24hr events: Episode of confusion overnight without myoclonic activity, resolved by time of physician evaluation, non-focal neuro exam at that time, otherwise without complaint    Lines: Peripheral IVx3    Infusions: D10 NS, insulin drip  ______________________________________________________________________  Physical Exam:     Physical Exam   Constitutional: He appears well-developed and well-nourished  No distress  HENT:   Head: Normocephalic  Right Ear: External ear normal    Left Ear: External ear normal    Nose: Nose normal    Mouth/Throat: Oropharynx is clear and moist  No oropharyngeal exudate  Eyes: Pupils are equal, round, and reactive to light  Conjunctivae are normal  Right eye exhibits no discharge  Left eye exhibits no discharge  No scleral icterus  Neck: Normal range of motion  No JVD present  No tracheal deviation present  No thyromegaly present  Cardiovascular: Normal rate, regular rhythm, normal heart sounds and intact distal pulses  Exam reveals no gallop and no friction rub  No murmur heard  Pulmonary/Chest: Effort normal and breath sounds normal  No stridor  No respiratory distress  He has no wheezes  He has no rales  He exhibits no tenderness  Abdominal: Soft  Bowel sounds are normal  He exhibits no distension and no mass  There is no tenderness  There is no rebound and no guarding  No hernia  Lymphadenopathy:     He has no cervical adenopathy  Neurological:   Sleepy but arousable, full strength and sensation bilaterally in upper and lower extremities   Skin: He is not diaphoretic       Review of Systems - Negative except nausea  ______________________________________________________________________  Temperature:   Temp (24hrs), Av 7 °F (37 1 °C), Min:97 9 °F (36 6 °C), Max:99 7 °F (37 6 °C)    Current Temperature: 98 5 °F (36 9 °C)    Vitals:    02/22/20 2100 02/22/20 2200 02/23/20 0000 02/23/20 0013   BP: 170/77 (!) 171/69 162/73 162/73   BP Location:       Pulse: 100 100 102    Resp: 22 (!) 23 21    Temp: 99 7 °F (37 6 °C)  98 5 °F (36 9 °C)    TempSrc:   Oral    SpO2: 98% 100% 94%    Weight:       Height:                 Weights:   IBW: 75 3 kg    Body mass index is 31 64 kg/m²  Weight (last 2 days)     Date/Time   Weight    02/22/20 0559   103 (226 85)    02/21/20 1300   96 7 (213 19)            Height: 5' 11" (180 3 cm)  Hemodynamic Monitoring:  PAP:         Ventilator Settings:   Vent Mode: AC/VC                      No results found for: PHART, ZJE2LTO, PO2ART, KJO0UMN, K7TMLKJQ, BEART, SOURCE    Intake and Outputs:  I/O       02/21 0701 - 02/22 0700 02/22 0701 - 02/23 0700    P  O   480    I V  (mL/kg) 788 2 (7 7) 1370 1 (13 3)    IV Piggyback 300 100    Total Intake(mL/kg) 1088 2 (10 6) 1950 1 (18 9)    Urine (mL/kg/hr) 350 325 (0 1)    Emesis/NG output 200 0    Total Output 550 325    Net +538 2 +1625 1          Unmeasured Emesis Occurrence  3 x        UOP: 0 131 cc/kg/hour   Nutrition:        Diet Orders   (From admission, onward)             Start     Ordered    02/22/20 0630  Diet Charanjit/CHO Controlled; Consistent Carbohydrate Diet Level 3 (6 carb servings/90 grams CHO/meal)  Diet effective now     Question Answer Comment   Diet Type Charanjit/CHO Controlled    Charanjit/CHO Controlled Consistent Carbohydrate Diet Level 3 (6 carb servings/90 grams CHO/meal)    RD to adjust diet per protocol?  Yes        02/22/20 0629    02/21/20 1056  Room Service  Once     Question:  Type of Service  Answer:  Room Service - Appropriate with Assistance    02/21/20 1055                Labs:   Results from last 7 days   Lab Units 02/22/20  0501 02/21/20  1150 02/21/20  0945  02/20/20  1804   WBC Thousand/uL 12 32* 8 65 7 82   < > 7 85   HEMOGLOBIN g/dL 9 7* 9 2* 9 8*   < > 10 4*   I STAT HEMOGLOBIN   --   --   --    < >  -- HEMATOCRIT % 30 1* 27 2* 29 0*   < > 32 6*   HEMATOCRIT, ISTAT   --   --   --    < >  --    PLATELETS Thousands/uL 249 253 261   < > 235   NEUTROS PCT % 75  --   --   --  60   MONOS PCT % 7  --   --   --  8    < > = values in this interval not displayed  Results from last 7 days   Lab Units 02/22/20  0501 02/21/20  1150 02/21/20  0945 02/21/20  0850  02/20/20  1804   POTASSIUM mmol/L 4 9 4 8 5 4*  --    < > 5 6*   CHLORIDE mmol/L 100 100 100  --    < > 101   CO2 mmol/L 25 24 24  --    < > 29   CO2, I-STAT mmol/L  --   --   --  24  --   --    BUN mg/dL 54* 50* 50*  --    < > 53*   CREATININE mg/dL 16 20* 15 20* 15 82*  --    < > 15 32*   CALCIUM mg/dL 8 6 8 9 9 0  --    < > 8 7   ALK PHOS U/L 65  --   --   --   --  77   ALT U/L 16  --   --   --   --  22   AST U/L 23  --   --   --   --  20   GLUCOSE, ISTAT mg/dl  --   --   --  275*  --   --     < > = values in this interval not displayed  Results from last 7 days   Lab Units 02/21/20  1150 02/21/20  0945 02/20/20  1804   MAGNESIUM mg/dL 2 8* 2 8* 2 5          Results from last 7 days   Lab Units 02/20/20  1804   INR  1 02   PTT seconds 33     Results from last 7 days   Lab Units 02/21/20  1150   LACTIC ACID mmol/L 3 5*     0   Lab Value Date/Time    TROPONINI 0 20 (H) 12/09/2019 2051    TROPONINI 0 18 (H) 12/09/2019 1725    TROPONINI 0 19 (H) 12/09/2019 1453    TROPONINI 0 15 (H) 12/09/2019 1115    TROPONINI 0 13 (H) 12/09/2019 0303    TROPONINI 0 02 12/07/2019 1835    TROPONINI 0 03 02/10/2018 2208    TROPONINI 0 02 02/10/2018 1744    TROPONINI 0 03 01/22/2018 2130    TROPONINI 0 05 (H) 01/22/2018 1748    TROPONINI 0 04 01/22/2018 1600    TROPONINI 0 04 01/22/2018 1015     Imaging: N/A I have personally reviewed pertinent reports  EKG: N/A  Micro:  Blood Culture:   Lab Results   Component Value Date    BLOODCX No Growth After 5 Days  12/09/2019    BLOODCX No Growth After 5 Days  12/09/2019    BLOODCX No Growth After 5 Days   12/09/2019    BLOODCX No Growth After 5 Days  12/09/2019    BLOODCX No Growth After 5 Days  12/08/2019    BLOODCX No Growth After 5 Days  12/08/2019     Urine Culture:   Lab Results   Component Value Date    URINECX No Growth <1000 cfu/mL 12/10/2019     Sputum Culture: No components found for: SPUTUMCX  Wound Culure:   Lab Results   Component Value Date    WOUNDCULT No growth 06/25/2019       Lab Results   Component Value Date    BLOODCX No Growth After 5 Days  12/09/2019    BLOODCX No Growth After 5 Days  12/09/2019    BLOODCX No Growth After 5 Days  12/09/2019    BLOODCX No Growth After 5 Days  12/09/2019    URINECX No Growth <1000 cfu/mL 12/10/2019    WOUNDCULT No growth 06/25/2019     Allergies:    Allergies   Allergen Reactions    Sulfa Antibiotics Rash     Medications:   Scheduled Meds:  Current Facility-Administered Medications:  acetaminophen 650 mg Oral Q6H PRN Lilo Cumins, CRNP    aspirin 81 mg Oral Daily Lilo Cumins, CRNP    atorvastatin 40 mg Oral HS Lilo Cumins, CRNP    calcium acetate 667 mg Oral TID With Meals Lilo Cumins, CRNP    chlorhexidine 15 mL Swish & Spit Q12H [address] Lilo Cumins, CRNP    cholecalciferol 2,000 Units Oral Daily Lilo Cumins, CRNP    cinacalcet 90 mg Oral Daily Lilo Cumins, CRNP    cloNIDine 0 2 mg Transdermal Weekly Favio Banai, DO    erythromycin base 250 mg Oral TID AC Favio Banai, DO    famotidine 20 mg Oral HS Lilo Cumins, CRNP    gabapentin 100 mg Oral HS Lilo Cumins, CRNP    gentamicin 1 application Topical Daily Lilo Cumins, CRNP    heparin (porcine) 5,000 Units Subcutaneous [facility] GATO CasasNP    hydrALAZINE 20 mg Intravenous Q4H PRN Favio Banai, DO    hydrALAZINE 50 mg Oral TID Favio Banai, DO    insulin regular (HumuLIN R,NovoLIN R) infusion 0 3-21 Units/hr Intravenous Titrated Lilo Jose, CRNP Last Rate: 3 Units/hr (02/23/20 0013)   labetalol 150 mg Oral Q12H HALIE Favio Banai, DO    Labetalol HCl 20 mg Intravenous Q2H PRN Favio Banai, DO    lactobacillus acidophilus-bulgaricus 1 packet Oral HS Lana Leung, CRNP    niCARdipine 1-15 mg/hr Intravenous Titrated Amber Muller MD Last Rate: 12 5 mg/hr (02/22/20 2354)   ondansetron 8 mg Intravenous Q6H PRN Favio Banai, DO Last Rate: 8 mg (02/23/20 0029)   dextrose 10 % and sodium chloride 0 9 % 50 mL/hr Intravenous Continuous Dayne A Paster, DO Last Rate: 50 mL/hr (02/22/20 1937)   torsemide 100 mg Oral Daily With Lunch Favio Banai, DO    valproate sodium 750 mg Intravenous Q8H [address] Favio Banai, DO Last Rate: Stopped (02/22/20 2228)     Continuous Infusions:  insulin regular (HumuLIN R,NovoLIN R) infusion 0 3-21 Units/hr Last Rate: 3 Units/hr (02/23/20 0013)   niCARdipine 1-15 mg/hr Last Rate: 12 5 mg/hr (02/22/20 2354)   dextrose 10 % and sodium chloride 0 9 % 50 mL/hr Last Rate: 50 mL/hr (02/22/20 1937)     PRN Meds:    acetaminophen 650 mg Q6H PRN   hydrALAZINE 20 mg Q4H PRN   Labetalol HCl 20 mg Q2H PRN   ondansetron 8 mg Q6H PRN     VTE Pharmacologic Prophylaxis: Heparin  VTE Mechanical Prophylaxis: sequential compression device and foot pump applied  Invasive lines and devices: Invasive Devices     Peripheral Intravenous Line            Peripheral IV 02/20/20 Left Antecubital 2 days    Peripheral IV 02/21/20 Left;Ventral (anterior) Hand 1 day    Peripheral IV 02/22/20 Right Wrist less than 1 day                   Code Status: Level 1 - Full Code    Portions of the record may have been created with voice recognition software  Occasional wrong word or "sound a like" substitutions may have occurred due to the inherent limitations of voice recognition software  Read the chart carefully and recognize, using context, where substitutions have occurred      Amber Muller MD

## 2020-02-23 NOTE — PROGRESS NOTES
Follow up Consultation    Nephrology   Daniel Ernesto Mckeon 37 y o  male MRN: [id number]  Unit/Bed#: ICU 02 Encounter: [id number]      Physician Requesting Consult: Jose Moore DO  Reason for Consult:  Dialysis management       ASSESSMENT/PLAN:  25-year-old male past medical history of homozygous MTHFR mutation, anemia, obesity, diabetes, hypertension and ESRD on PD initially admitted to [facility] with dizziness lightheadedness subsequently had a seizure and was intubated and transferred over to [facility] for higher level of care for continuous EEG monitoring  Nephrology following for dialysis management  ESRD:  - Gets usual peritoneal dialysis with [facility] follows at [facility] dialysis  - Will do PD today- orders in place   - Estimated dry weight = 94 kilos  - Access - PD catheter  - outpatient orders are 4 cycles of 2200 with last fill of 1 5 L total time of 10 hours  - will adjust PD orders to change to 2 5% dextrose solutions to help with volume as well as blood pressures  - total UF the last 24 hours of 741, clear effluent  - Acid base and lytes stable  - clinically appears to be minimally hypervolemic  - Renal diet     H/H:   -on Epogen 45420 units weekly as an outpatient hold for now due to acute seizures  - maintain hemoglobin greater than 8 grams/deciliter  - most recent hemoglobin at 9 5 grams/deciliter  - Goal Hb 10-12gm/dL     Bone Mineral Disease:  - check ca, phos  - follow-up by PTH as an outpatient  - on PhosLo 1 tab p o  T i d , Sensipar 90 mg p o  Q day, vitamin-D 2000 units p o  Q day     BP:  - stable, on  clonidine 0 1 mg p o  T i d  hydralazine 50 mg p o  T i d  labetalol 150 mg p o  B i d torsemide 100 mg p o  Q day  - change over to dextrose 2 5% with PD so that get have increase UF that will help blood pressures  Currently on nicardipine drip once tolerating p o  Then can be restarted back with p o  Meds       Volume status:  - Clinically is minimally hypervolemic   - continue current orders    Seizure episode:  - management as per Primary team  - follow-up with neurology  - on valproate    Diabetes:  - Management as per primary team  - on insulin      Thanks for the consult  Will continue to follow  Please call with questions  Above-mentioned orders and Plan in terms of dialysis was discussed with the team in [address] Mckeon MD, [facility], 2020, 9:43 AM            Objective :  Patient seen and examined in the ICU earlier this a m  No overnight events had some bouts of nausea and vomiting x1 T-max of 99 5° blood pressure was elevated is on nicardipine drip  No issues with PD overnight  P o  Meds are being held because of his nausea at this time    PHYSICAL EXAM  BP (!) 178/75   Pulse 99   Temp 99 5 °F (37 5 °C) (Oral)   Resp 15   Ht 5' 11" (1 803 m)   Wt 103 kg (226 lb 13 7 oz)   SpO2 98%   BMI 31 64 kg/m²   Temp (24hrs), Av 9 °F (37 2 °C), Min:98 2 °F (36 8 °C), Max:99 7 °F (37 6 °C)        Intake/Output Summary (Last 24 hours) at 2020 0943  Last data filed at 2020 0401  Gross per 24 hour   Intake 3012 3 ml   Output 325 ml   Net 2687 3 ml       I/O last 24 hours: In: 3138 5 [P O :480; I V :2508 5; IV Piggyback:150]  Out: 325 [Urine:325]      Current Weight: Weight - Scale: 103 kg (226 lb 13 7 oz)  First Weight: Weight - Scale: 96 7 kg (213 lb 3 oz)  Physical Exam   Constitutional: He is oriented to person, place, and time  He appears well-developed and well-nourished  No distress  HENT:   Head: Normocephalic and atraumatic  Mouth/Throat: No oropharyngeal exudate  Eyes: Conjunctivae are normal  No scleral icterus  Neck: Neck supple  No JVD present  No tracheal deviation present  No thyromegaly present  Cardiovascular: Normal heart sounds  Exam reveals no friction rub  Pulmonary/Chest: Effort normal  He has no wheezes  He has no rales  Abdominal: Soft  He exhibits no distension and no mass  PD catheter in place   Musculoskeletal: He exhibits edema  Trace edema bilateral upper lower extremities   Neurological: He is alert and oriented to person, place, and time  Skin: Skin is warm  He is not diaphoretic  No pallor  Psychiatric: He has a normal mood and affect  Nursing note and vitals reviewed  Review of Systems   Constitutional: Positive for fatigue  Negative for appetite change, chills and fever  HENT: Negative for congestion  Respiratory: Negative for cough, shortness of breath and wheezing  Cardiovascular: Negative for chest pain and leg swelling  Gastrointestinal: Positive for nausea  Negative for constipation, diarrhea and vomiting  Genitourinary: Negative for flank pain  Musculoskeletal: Negative for back pain  Neurological: Negative for dizziness and headaches  Psychiatric/Behavioral: Negative for agitation and confusion  All other systems reviewed and are negative        Scheduled Meds:    Current Facility-Administered Medications:  acetaminophen 650 mg Oral Q6H PRN Unknown [place], CRNP    aspirin 81 mg Oral Daily Unknown [place], CRNP    atorvastatin 40 mg Oral HS Unknown [place], CRNP    calcium acetate 667 mg Oral TID With Meals Unknown [place], CRNP    chlorhexidine 15 mL Swish & Spit Q12H [address] Unknown [place], CRNP    cholecalciferol 2,000 Units Oral Daily Unknown [place], CRNP    cinacalcet 90 mg Oral Daily Unknown [place], CRNP    cloNIDine 0 2 mg Transdermal Weekly Favio Banai, DO    erythromycin base 250 mg Oral TID AC Favio Peggyai, DO    famotidine 20 mg Oral HS Unknown [place], CRNP    gabapentin 100 mg Oral HS Unknown [place], CRNP    gentamicin 1 application Topical Daily Unknown [place], CRNP    heparin (porcine) 5,000 Units Subcutaneous [facility] DEISI Casas    hydrALAZINE 20 mg Intravenous Q4H PRN Favio Banai, DO    hydrALAZINE 50 mg Oral TID Favio Peggyai, DO    insulin regular (HumuLIN R,NovoLIN R) infusion 0 3-21 Units/hr Intravenous Titrated Unknown [place], CRNP Last Rate: 3 Units/hr (02/23/20 7261)   labetalol 150 mg Oral Q12H [facility] Favio Renner, DO    Labetalol HCl 20 mg Intravenous Q2H PRN Faviomarisa Renner, DO    lactobacillus acidophilus-bulgaricus 1 packet Oral HS DEISI Pro    niCARdipine 1-15 mg/hr Intravenous Titrated Linda Souza MD Last Rate: 10 mg/hr (02/23/20 0830)   ondansetron 8 mg Intravenous Q6H PRN Favio Renner, DO Last Rate: 8 mg (02/23/20 0536)   dextrose 10 % and sodium chloride 0 9 % 50 mL/hr Intravenous Continuous Dayne A Paster, DO Last Rate: 50 mL/hr (02/22/20 1937)   torsemide 100 mg Oral Daily With Lunch Favio Renner, DO    valproate sodium 750 mg Intravenous Q8H [facility] Peggy, DO Last Rate: 750 mg (02/23/20 0440)       PRN Meds:   acetaminophen    hydrALAZINE    Labetalol HCl    ondansetron    Continuous Infusions:    insulin regular (HumuLIN R,NovoLIN R) infusion 0 3-21 Units/hr Last Rate: 3 Units/hr (02/23/20 0828)   niCARdipine 1-15 mg/hr Last Rate: 10 mg/hr (02/23/20 0830)   dextrose 10 % and sodium chloride 0 9 % 50 mL/hr Last Rate: 50 mL/hr (02/22/20 1937)         Invasive Devices:    Invasive Devices     Peripheral Intravenous Line            Peripheral IV 02/20/20 Left Antecubital 2 days    Peripheral IV 02/21/20 Left;Ventral (anterior) Hand 2 days    Peripheral IV 02/22/20 Right Wrist 1 day                  LABORATORY:    Results from last 7 days   Lab Units 02/23/20  0437 02/22/20  0501 02/21/20  1150 02/21/20  0945 02/21/20  0850 02/21/20  0429 02/20/20  1804   WBC Thousand/uL 10 91* 12 32* 8 65 7 82  --  9 21 7 85   HEMOGLOBIN g/dL 9 5* 9 7* 9 2* 9 8*  --  11 1* 10 4*   I STAT HEMOGLOBIN g/dl  --   --   --   --  10 2*  --   --    HEMATOCRIT % 29 2* 30 1* 27 2* 29 0*  --  34 1* 32 6*   HEMATOCRIT, ISTAT %  --   --   --   --  30*  --   --    PLATELETS Thousands/uL 254 249 253 261  --  271 235   POTASSIUM mmol/L 3 7 4 9 4 8 5 4*  --  4 6 5 6*   CHLORIDE mmol/L 103 100 100 100  --  101 101   CO2 mmol/L 24 25 24 24  --  27 29   CO2, I-STAT mmol/L  --   --   --   --  24  --   --    BUN mg/dL 52* 54* 50* 50*  --  52* 53*   CREATININE mg/dL 15 40* 16 20* 15 20* 15 82*  --  15 44* 15 32*   CALCIUM mg/dL 9 2 8 6 8 9 9 0  --  9 0 8 7   MAGNESIUM mg/dL  --   --  2 8* 2 8*  --   --  2 5   GLUCOSE, ISTAT mg/dl  --   --   --   --  275*  --   --       rest all reviewed    RADIOLOGY:  No orders to display     Rest all reviewed    Portions of the record may have been created with voice recognition software  Occasional wrong word or "sound a like" substitutions may have occurred due to the inherent limitations of voice recognition software  Read the chart carefully and recognize, using context, where substitutions have occurred  If you have any questions, please contact the dictating provider

## 2020-02-24 ENCOUNTER — APPOINTMENT (INPATIENT)
Dept: RADIOLOGY | Facility: HOSPITAL | Age: 35
DRG: 100 | End: 2020-02-24
Payer: MEDICARE

## 2020-02-24 LAB
ANION GAP SERPL CALCULATED.3IONS-SCNC: 14 MMOL/L (ref 4–13)
BUN SERPL-MCNC: 47 MG/DL (ref 5–25)
CALCIUM SERPL-MCNC: 9.7 MG/DL (ref 8.3–10.1)
CHLORIDE SERPL-SCNC: 105 MMOL/L (ref 100–108)
CO2 SERPL-SCNC: 23 MMOL/L (ref 21–32)
CREAT SERPL-MCNC: 15.4 MG/DL (ref 0.6–1.3)
ERYTHROCYTE [DISTWIDTH] IN BLOOD BY AUTOMATED COUNT: 15.1 % (ref 11.6–15.1)
GFR SERPL CREATININE-BSD FRML MDRD: 4 ML/MIN/1.73SQ M
GLUCOSE SERPL-MCNC: 116 MG/DL (ref 65–140)
GLUCOSE SERPL-MCNC: 117 MG/DL (ref 65–140)
GLUCOSE SERPL-MCNC: 133 MG/DL (ref 65–140)
GLUCOSE SERPL-MCNC: 139 MG/DL (ref 65–140)
GLUCOSE SERPL-MCNC: 145 MG/DL (ref 65–140)
GLUCOSE SERPL-MCNC: 150 MG/DL (ref 65–140)
GLUCOSE SERPL-MCNC: 150 MG/DL (ref 65–140)
GLUCOSE SERPL-MCNC: 173 MG/DL (ref 65–140)
GLUCOSE SERPL-MCNC: 181 MG/DL (ref 65–140)
GLUCOSE SERPL-MCNC: 187 MG/DL (ref 65–140)
GLUCOSE SERPL-MCNC: 198 MG/DL (ref 65–140)
GLUCOSE SERPL-MCNC: 261 MG/DL (ref 65–140)
GLUCOSE SERPL-MCNC: 280 MG/DL (ref 65–140)
GLUCOSE SERPL-MCNC: 52 MG/DL (ref 65–140)
HCT VFR BLD AUTO: 28.1 % (ref 36.5–49.3)
HGB BLD-MCNC: 9.1 G/DL (ref 12–17)
MCH RBC QN AUTO: 31.7 PG (ref 26.8–34.3)
MCHC RBC AUTO-ENTMCNC: 32.4 G/DL (ref 31.4–37.4)
MCV RBC AUTO: 98 FL (ref 82–98)
PHOSPHATE SERPL-MCNC: 8.8 MG/DL (ref 2.7–4.5)
PLATELET # BLD AUTO: 242 THOUSANDS/UL (ref 149–390)
PMV BLD AUTO: 10.3 FL (ref 8.9–12.7)
POTASSIUM SERPL-SCNC: 3.7 MMOL/L (ref 3.5–5.3)
RBC # BLD AUTO: 2.87 MILLION/UL (ref 3.88–5.62)
SODIUM SERPL-SCNC: 142 MMOL/L (ref 136–145)
WBC # BLD AUTO: 9.9 THOUSAND/UL (ref 4.31–10.16)

## 2020-02-24 PROCEDURE — 85027 COMPLETE CBC AUTOMATED: CPT | Performed by: STUDENT IN AN ORGANIZED HEALTH CARE EDUCATION/TRAINING PROGRAM

## 2020-02-24 PROCEDURE — 82948 REAGENT STRIP/BLOOD GLUCOSE: CPT

## 2020-02-24 PROCEDURE — 99232 SBSQ HOSP IP/OBS MODERATE 35: CPT | Performed by: INTERNAL MEDICINE

## 2020-02-24 PROCEDURE — NC001 PR NO CHARGE: Performed by: INTERNAL MEDICINE

## 2020-02-24 PROCEDURE — 80048 BASIC METABOLIC PNL TOTAL CA: CPT | Performed by: STUDENT IN AN ORGANIZED HEALTH CARE EDUCATION/TRAINING PROGRAM

## 2020-02-24 PROCEDURE — 99233 SBSQ HOSP IP/OBS HIGH 50: CPT | Performed by: PSYCHIATRY & NEUROLOGY

## 2020-02-24 PROCEDURE — 97167 OT EVAL HIGH COMPLEX 60 MIN: CPT

## 2020-02-24 PROCEDURE — 74018 RADEX ABDOMEN 1 VIEW: CPT

## 2020-02-24 PROCEDURE — 99233 SBSQ HOSP IP/OBS HIGH 50: CPT | Performed by: INTERNAL MEDICINE

## 2020-02-24 PROCEDURE — 97163 PT EVAL HIGH COMPLEX 45 MIN: CPT

## 2020-02-24 PROCEDURE — 84100 ASSAY OF PHOSPHORUS: CPT | Performed by: STUDENT IN AN ORGANIZED HEALTH CARE EDUCATION/TRAINING PROGRAM

## 2020-02-24 RX ORDER — CLONIDINE HYDROCHLORIDE 0.1 MG/1
0.1 TABLET ORAL EVERY 8 HOURS SCHEDULED
Status: DISCONTINUED | OUTPATIENT
Start: 2020-02-24 | End: 2020-02-28 | Stop reason: HOSPADM

## 2020-02-24 RX ORDER — INSULIN GLARGINE 100 [IU]/ML
10 INJECTION, SOLUTION SUBCUTANEOUS EVERY MORNING
Status: DISCONTINUED | OUTPATIENT
Start: 2020-02-25 | End: 2020-02-26

## 2020-02-24 RX ORDER — CLONIDINE HYDROCHLORIDE 0.2 MG/1
0.2 TABLET ORAL EVERY 12 HOURS SCHEDULED
Status: DISCONTINUED | OUTPATIENT
Start: 2020-02-25 | End: 2020-02-24

## 2020-02-24 RX ORDER — ATORVASTATIN CALCIUM 40 MG/1
40 TABLET, FILM COATED ORAL EVERY EVENING
Status: DISCONTINUED | OUTPATIENT
Start: 2020-02-24 | End: 2020-02-24 | Stop reason: SDUPTHER

## 2020-02-24 RX ORDER — INSULIN GLARGINE 100 [IU]/ML
15 INJECTION, SOLUTION SUBCUTANEOUS
Status: DISCONTINUED | OUTPATIENT
Start: 2020-02-24 | End: 2020-02-24

## 2020-02-24 RX ORDER — INSULIN GLARGINE 100 [IU]/ML
14 INJECTION, SOLUTION SUBCUTANEOUS
Status: DISCONTINUED | OUTPATIENT
Start: 2020-02-24 | End: 2020-02-28 | Stop reason: HOSPADM

## 2020-02-24 RX ORDER — INSULIN GLARGINE 100 [IU]/ML
12 INJECTION, SOLUTION SUBCUTANEOUS EVERY MORNING
Status: DISCONTINUED | OUTPATIENT
Start: 2020-02-24 | End: 2020-02-24

## 2020-02-24 RX ORDER — DEXTROSE MONOHYDRATE 25 G/50ML
INJECTION, SOLUTION INTRAVENOUS
Status: COMPLETED
Start: 2020-02-24 | End: 2020-02-24

## 2020-02-24 RX ORDER — NIFEDIPINE 60 MG/1
60 TABLET, EXTENDED RELEASE ORAL DAILY
Status: DISCONTINUED | OUTPATIENT
Start: 2020-02-24 | End: 2020-02-26

## 2020-02-24 RX ORDER — LISINOPRIL 20 MG/1
40 TABLET ORAL DAILY
Status: DISCONTINUED | OUTPATIENT
Start: 2020-02-24 | End: 2020-02-28 | Stop reason: HOSPADM

## 2020-02-24 RX ORDER — LABETALOL 200 MG/1
200 TABLET, FILM COATED ORAL EVERY 12 HOURS SCHEDULED
Status: DISCONTINUED | OUTPATIENT
Start: 2020-02-24 | End: 2020-02-24

## 2020-02-24 RX ORDER — DEXTROSE MONOHYDRATE 25 G/50ML
12.5 INJECTION, SOLUTION INTRAVENOUS ONCE
Status: DISCONTINUED | OUTPATIENT
Start: 2020-02-24 | End: 2020-02-28 | Stop reason: HOSPADM

## 2020-02-24 RX ORDER — POTASSIUM CHLORIDE 20 MEQ/1
40 TABLET, EXTENDED RELEASE ORAL ONCE
Status: COMPLETED | OUTPATIENT
Start: 2020-02-24 | End: 2020-02-24

## 2020-02-24 RX ORDER — ERYTHROMYCIN 250 MG/1
250 TABLET, COATED ORAL
Status: COMPLETED | OUTPATIENT
Start: 2020-02-24 | End: 2020-02-26

## 2020-02-24 RX ORDER — ONDANSETRON 2 MG/ML
4 INJECTION INTRAMUSCULAR; INTRAVENOUS ONCE
Status: DISCONTINUED | OUTPATIENT
Start: 2020-02-24 | End: 2020-02-24

## 2020-02-24 RX ADMIN — LISINOPRIL 40 MG: 20 TABLET ORAL at 09:18

## 2020-02-24 RX ADMIN — DEXTROSE 50 % IN WATER (D50W) INTRAVENOUS SYRINGE 50 ML: at 15:42

## 2020-02-24 RX ADMIN — HYDRALAZINE HYDROCHLORIDE 50 MG: 50 TABLET ORAL at 09:18

## 2020-02-24 RX ADMIN — INSULIN GLARGINE 14 UNITS: 100 INJECTION, SOLUTION SUBCUTANEOUS at 21:59

## 2020-02-24 RX ADMIN — SODIUM CHLORIDE 50 ML/HR: 234 INJECTION INTRAMUSCULAR; INTRAVENOUS; SUBCUTANEOUS at 10:52

## 2020-02-24 RX ADMIN — TORSEMIDE 100 MG: 20 TABLET ORAL at 12:56

## 2020-02-24 RX ADMIN — ONDANSETRON 8 MG: 2 INJECTION INTRAMUSCULAR; INTRAVENOUS at 09:26

## 2020-02-24 RX ADMIN — NIFEDIPINE 60 MG: 60 TABLET, FILM COATED, EXTENDED RELEASE ORAL at 10:49

## 2020-02-24 RX ADMIN — HYDRALAZINE HYDROCHLORIDE 20 MG: 20 INJECTION INTRAMUSCULAR; INTRAVENOUS at 12:54

## 2020-02-24 RX ADMIN — LABETALOL 20 MG/4 ML (5 MG/ML) INTRAVENOUS SYRINGE 20 MG: at 03:21

## 2020-02-24 RX ADMIN — HYDRALAZINE HYDROCHLORIDE 20 MG: 20 INJECTION INTRAMUSCULAR; INTRAVENOUS at 00:14

## 2020-02-24 RX ADMIN — VALPROATE SODIUM 750 MG: 100 INJECTION, SOLUTION INTRAVENOUS at 12:55

## 2020-02-24 RX ADMIN — INSULIN LISPRO 1 UNITS: 100 INJECTION, SOLUTION INTRAVENOUS; SUBCUTANEOUS at 18:12

## 2020-02-24 RX ADMIN — HEPARIN SODIUM 5000 UNITS: 5000 INJECTION INTRAVENOUS; SUBCUTANEOUS at 21:59

## 2020-02-24 RX ADMIN — HYDRALAZINE HYDROCHLORIDE 20 MG: 20 INJECTION INTRAMUSCULAR; INTRAVENOUS at 05:50

## 2020-02-24 RX ADMIN — ONDANSETRON 8 MG: 2 INJECTION INTRAMUSCULAR; INTRAVENOUS at 18:15

## 2020-02-24 RX ADMIN — ERYTHROMYCIN 250 MG: 250 TABLET, FILM COATED ORAL at 19:02

## 2020-02-24 RX ADMIN — HYDRALAZINE HYDROCHLORIDE 50 MG: 50 TABLET ORAL at 21:59

## 2020-02-24 RX ADMIN — LABETALOL HYDROCHLORIDE 150 MG: 100 TABLET, FILM COATED ORAL at 09:19

## 2020-02-24 RX ADMIN — ONDANSETRON 8 MG: 2 INJECTION INTRAMUSCULAR; INTRAVENOUS at 01:22

## 2020-02-24 RX ADMIN — CINACALCET HYDROCHLORIDE 90 MG: 30 TABLET, FILM COATED ORAL at 09:22

## 2020-02-24 RX ADMIN — HEPARIN SODIUM 5000 UNITS: 5000 INJECTION INTRAVENOUS; SUBCUTANEOUS at 05:51

## 2020-02-24 RX ADMIN — CLONIDINE HYDROCHLORIDE 0.1 MG: 0.1 TABLET ORAL at 22:00

## 2020-02-24 RX ADMIN — POTASSIUM CHLORIDE 40 MEQ: 1500 TABLET, EXTENDED RELEASE ORAL at 09:19

## 2020-02-24 RX ADMIN — GABAPENTIN 100 MG: 100 CAPSULE ORAL at 21:59

## 2020-02-24 RX ADMIN — LABETALOL HYDROCHLORIDE 50 MG: 100 TABLET, FILM COATED ORAL at 10:50

## 2020-02-24 RX ADMIN — MELATONIN 2000 UNITS: at 09:18

## 2020-02-24 RX ADMIN — ASPIRIN 81 MG 81 MG: 81 TABLET ORAL at 09:18

## 2020-02-24 RX ADMIN — HEPARIN SODIUM 5000 UNITS: 5000 INJECTION INTRAVENOUS; SUBCUTANEOUS at 15:42

## 2020-02-24 RX ADMIN — CLONIDINE HYDROCHLORIDE 0.1 MG: 0.1 TABLET ORAL at 15:46

## 2020-02-24 RX ADMIN — FAMOTIDINE 20 MG: 20 TABLET ORAL at 21:59

## 2020-02-24 RX ADMIN — ERYTHROMYCIN 250 MG: 250 TABLET, FILM COATED ORAL at 09:22

## 2020-02-24 RX ADMIN — VALPROATE SODIUM 750 MG: 100 INJECTION, SOLUTION INTRAVENOUS at 17:23

## 2020-02-24 RX ADMIN — LABETALOL HCL 300 MG: 200 TABLET, FILM COATED ORAL at 21:59

## 2020-02-24 RX ADMIN — ATORVASTATIN CALCIUM 40 MG: 40 TABLET, FILM COATED ORAL at 22:00

## 2020-02-24 RX ADMIN — LABETALOL 20 MG/4 ML (5 MG/ML) INTRAVENOUS SYRINGE 20 MG: at 19:24

## 2020-02-24 RX ADMIN — INSULIN LISPRO 2 UNITS: 100 INJECTION, SOLUTION INTRAVENOUS; SUBCUTANEOUS at 21:59

## 2020-02-24 RX ADMIN — GENTAMICIN SULFATE 1 APPLICATION: 1 OINTMENT TOPICAL at 22:46

## 2020-02-24 RX ADMIN — CHLORHEXIDINE GLUCONATE 0.12% ORAL RINSE 15 ML: 1.2 LIQUID ORAL at 09:19

## 2020-02-24 RX ADMIN — VALPROATE SODIUM 750 MG: 100 INJECTION, SOLUTION INTRAVENOUS at 05:50

## 2020-02-24 RX ADMIN — ONDANSETRON 8 MG: 2 INJECTION INTRAMUSCULAR; INTRAVENOUS at 12:53

## 2020-02-24 RX ADMIN — HYDRALAZINE HYDROCHLORIDE 50 MG: 50 TABLET ORAL at 16:11

## 2020-02-24 NOTE — PROGRESS NOTES
Transfer Note - Critical Care   Dylan Mckeon 37 y o  male MRN: [id number]  Unit/Bed#: ICU 02 Encounter: [id number]    Code Status: Level 1 - Full Code  POA:    POLST:      Reason for ICU admission:   Status Epilepticus     Active problems:   Principal Problem:    Seizure-like activity ([facility] )  Active Problems:    Type 1 diabetes mellitus with hypoglycemia ([facility] )    History of lacunar cerebrovascular accident (CVA)    Homozygous MTHFR mutation [id number] ([facility] )    End-stage renal disease on peritoneal dialysis ([facility] )    Renovascular hypertension    Dyslipidemia  Resolved Problems:    * No resolved hospital problems  *      Consultants:   Neurology: Rudy Christian PA-C, Yaw Gutierrez PA-C,   Nephrology: Dr Aniket Lopez DO, Dr Heike Davison MD    History of Present Illness: Per Admitting Physician, Dr Timothy Carballo MD  "Skip Medina is a 37 y o  male with type 1 insulin dependent diabetes complicated with nephropathy, gastroparesis, retinopathy and neuropathy, history of CVA x 2, ESRD on PD 2/2 T1DM, hypertension, dyslipidemia, patient is on the pancreas/renal transplant list at [facility] who presented to [facility] on 2/20/2020 with vertigo and lightheadedness for 2 days intermittently  He also had word finding difficulty but no slurred speech, occipital headache, and nausea  He noted that he felt he was twitching more than normal  The following day, patient was found to have seizures  He received 6 mg of Ativan, loaded with depacon 20 mg/kg and started on 5 mg/kg of depacon q8h    Patient was successfully intubated and transferred to [facility] for continuous EEG monitoring "    Summary of clinical course:   Patient was transferred to [facility] from [facility] on 2/21 for Video EEG monitoring due to new onset seizures and due to profound hypertension  Neurology, Epileptology, and Nephrology were consulted   Patient's Depakote level was found to be low at 36 and given another bolus of Depaoke and per Neurology increased his dosing to 750mg q8hrs  Review of VEEG showed "Background activities of reactive anteriorly dominant beta activities with diffuse delta activities gradually increase in frequency with emergence of the posteriorly dominant rhythm, but still with intermittent delta activities by the end of the recording, which suggest initial severe diffuse bilateral cerebral dysfunction (likely with contributions of sedating medications), that gradually improves over the course of the tracing, but remains too slow, suggesting mild diffuse cerebral dysfunction of nonspecific etiology by the end of the tracing; around the time of the push button for head turning and coughing, there was no EEG change from baseline activities, suggesting the event was not a seizure  No electrographic seizures or interictal epileptiform discharges were seen"  Video EEG monitoring from 02/22-2/23 showed "Background activities with diffuse delta activities are too slow suggesting mild to moderate diffuse cerebral dysfunction of nonspecific etiology  More prominent delta activities in the left hemisphere suggests slightly worse cerebral dysfunction in left hemisphere compared to the right  A spell of stretching upon arousal at a period of word-finding difficulty were seen without EEG changes, suggesting those events were not seizures "  Per Neurology, seizure like activity likely provoked in the setting of uncontrolled HTN and concern for rebound effects of Clonidine contributing to the uncontrolled HTN  Recommendations include reducing Depacon to 750mg BID and to follow up with outpatient epilepsy  In addition to status epilepticus, patient was started on a cardene infusion, 2 5units/ min due to renovasular hypertension uncontrolled on home medications and after multiple boluses of Labetalol and Hydralazine  Cardene drip was discontinued on 2/23  Restarted on home medications and per Nephrology increased PTA Labetalol to 200mg BID   Patient also experience nausea and vomiting, likely due to history of gastroparesis  Started on scheduled Zofran IV and Erythromycin  Symptoms controlled however had one reoccurrence of vomiting on 2/24, given one dose of Zofran, KUB ordered, and GI consult placed for further evaluation and recommendations  Assessment and Plan:     Neuro:   · Diagnosis: Status Epilepticus 2/2 unknown etiology  ? Neurology following, appreciate recommendations  ? Plan: Continue Depacon 750mg BID per Neurology   ? Patient will need follow up with outpatient epilepsy  ? Remains extubated, awake and alert, as well as following commands        CV:   · Diagnosis: Renovascular Hypertension  ? Plan: Continue Labetalol 300 BID, Hydralazine 50mg TID, Clonidine 0 2mg transdermal patch, Lisinopril 40mg, Nifedipine 60mg daily, Torsemide 100mg q daily  ? PRN: Labetalol and Hydralazine           Pulm:  · Diagnosis: No Active Issues  ? Extubated on 2/22, remains on room air saturating in high 90s  ? Plan: Supplemental oxygen to maintain oxygen saturation above 92%     GI:   · PPX: Pepcid PO  · Diagnosis: History of Gastroparesis  ? Plan: Continue Erythromycin 250mg TID, Zofran prn, Advance diet as tolerated  ? If refractory nausea: 1x Haldol or 1x Bendadryl  ? KUB ordered, pending read  ? GI consult for further evaluation  · Diet plan: Diabetic diet          :   · Diagnosis: ESRD on Peritoneal Dialysis  ? Per Nephrology, gets usual peritoneal dialysis with CCPD follows at [facility]  ? PD yesterday, access via PD catheter  ? Nephrology following, appreciate recs     · D/C'ed Hoffman Catheter, now on urinary retention protocol  ? UOP: 0  ? Plan: continue urinary retention protocol        F/E/N:   · Plan: continue to monitor and replete electrolytes as needed to maintain K>4, Phos >3, Mag >2  ? K at 3 7 today, will replete         Heme/Onc:   · Diagnosis: No Active Issues  ? Plan: Trend Hgb and Plts   ? Per Nephrology, maintain Hgb greater than 8  ?  Hgb: at 9 1 today  · DVT PPx: Subcutaneous Heparin        Endo:   · Diagnosis: Type 1 DM on insulin   ? Plan: Insulin gtt discontinued  ? Transition to SSI with basal coverage of Lantus 10U in AM and 15 U in PM, (will add) Lispro 5U with meals when able to tolerate diet        ID:   · Diagnosis: No active issues  ? Plan: continue to monitor fever curve and WBC          MSK/Skin:   · Diagnosis: No active issues  ? Plan: PT/ OT no applicable    Recent or scheduled procedures:   Video EEG Monitoring:  (02/21/2020-02/22/2020) abnormal   Background activities of reactive anteriorly dominant beta activities with diffuse delta activities gradually increase in frequency with emergence of the posteriorly dominant rhythm, but still with intermittent delta activities by the end of the recording, which suggest initial severe diffuse bilateral cerebral dysfunction (likely with contributions of sedating medications), that gradually improves over the course of the tracing, but remains too slow, suggesting mild diffuse cerebral dysfunction of nonspecific etiology by the end of the tracing; around the time of the push button for head turning and coughing, there was no EEG change from baseline activities, suggesting the event was not a seizure  No electrographic seizures or interictal epileptiform discharges were seen  -vEEG (02/22/2020-02/23/2020) abnormal   Background activities with diffuse delta activities are too slow suggesting mild to moderate diffuse cerebral dysfunction of nonspecific etiology  More prominent delta activities in the left hemisphere suggests slightly worse cerebral dysfunction in left hemisphere compared to the right  A spell of stretching upon arousal at a period of word-finding difficulty were seen without EEG changes, suggesting those events were not seizures      -CT head 02/20/2020 unremarkable for acute intracranial abnormalities  -MRI brain unremarkable for evidence of acute infarct or intracranial hemorrhage    Outstanding/pending diagnostics:   KUB pending     Cultures:   None       Mobilization Plan:   PT/OT      Nutrition Plan:   Diabetic Diet     VTE Pharmacologic Prophylaxis: Heparin  VTE Mechanical Prophylaxis: sequential compression device    Discharge Plan:   Patient should be ready for discharge to General Medicine Floor on 2/24  Initial Physical Therapy Recommendations: pending evaluation  Initial Occupational Therapy Recommendations: pending evaluation  Initial /Plan:       Spoke with Dr Mitesh Ortiz regarding transfer  Please call [phone] with any questions or concerns  Portions of the record may have been created with voice recognition software  Occasional wrong word or "sound a like" substitutions may have occurred due to the inherent limitations of voice recognition software  Read the chart carefully and recognize, using context, where substitutions have occurred

## 2020-02-24 NOTE — PROGRESS NOTES
Progress Note - Neurology   Saira Manuel Mckeon 37 y o  male MRN: [id number]  Unit/Bed#: ICU 02 Encounter: [id number]    Assessment:  Datne Hirsch is a 37 y o  male with ESRD on PD, history of prior CVA x 2 on ASA and statin, HTN, dyslipidemia, DM type 1 complicated by neuropathy, gastroparesis, and retinopathy, homozygous MTHFR mutation, heterozygous for prothrombin [id number] mutation who initially presented to [place] with word-finding difficulties, dizziness, and RUE jerks thought to be partial seizures which eventually evolved in resulted in subsequent status epilepticus  Patient was intubated [place] and transferred to [place] for video EEG monitoring  1  Status epilepticus, now resolved and off video EEG monitoring  Strong suspicion for provoked seizure int he setting of uncontrolled HTN  Concern for rebound effects of Clonidine contributing to the uncontrolled HTN  Given suspicion for provoked seizure, patient would not need to remain on AEDs, however witnessed left facial twitching on exam today (possibly myoclonic twitching) in the setting an elevated BP of 176/85  Recommend reducing Depacon to 750 mg BID and following up with outpatient epilepsy  2  HTN, appears to be uncontrolled  Medical management per ICU team, recommend follow up with outpatient nephrology for medication adjustment  Concern clonidine may be causing rebound effect attributing to uncontrolled HTN, resulting in seizure activity  3  ESRD, currently on CCPD  Nephrology following  Patient is currently on the kidney transplant list    4  History of prior CVAs x 2  Continue on ASA and statin therapy  Patient reports he was previously on Plavix, however was switched to ASA due to being on the transplant list    5  DM type 1 on insulin, complicated by neuropathy, gastroparesis, and nephropathy    Medical management per primary team   Patient is currently on the transplant list for pancreas    Plan:  -Off video EEG monitoring  -Recommend reducing Depacon 750 mg Q8H to 750 mg BID  -Continue ASA 81 mg daily  -Continue atorvastatin 40 mg daily  -Seizure precautions  -Medical management per primary team  -Continue supportive care per primary team, notify with changes  -The patient should follow-up with outpatient epilepsy team  Communication has been sent to the office to schedule a follow-up appointment  Imaging/Labs:  -CT head 02/20/2020 unremarkable for acute intracranial abnormalities  -MRI brain unremarkable for evidence of acute infarct or intracranial hemorrhage    -vEEG (02/21/2020-02/22/2020) abnormal   Background activities of reactive anteriorly dominant beta activities with diffuse delta activities gradually increase in frequency with emergence of the posteriorly dominant rhythm, but still with intermittent delta activities by the end of the recording, which suggest initial severe diffuse bilateral cerebral dysfunction (likely with contributions of sedating medications), that gradually improves over the course of the tracing, but remains too slow, suggesting mild diffuse cerebral dysfunction of nonspecific etiology by the end of the tracing; around the time of the push button for head turning and coughing, there was no EEG change from baseline activities, suggesting the event was not a seizure  No electrographic seizures or interictal epileptiform discharges were seen  -vEEG (02/22/2020-02/23/2020) abnormal   Background activities with diffuse delta activities are too slow suggesting mild to moderate diffuse cerebral dysfunction of nonspecific etiology  More prominent delta activities in the left hemisphere suggests slightly worse cerebral dysfunction in left hemisphere compared to the right    A spell of stretching upon arousal at a period of word-finding difficulty were seen without EEG changes, suggesting those events were not seizures     -Hyperkalemic on presentation with a potassium of 5 6, most recent 3 7  -Creatinine on presentation 15 32, most recent 15 40  -BUN on presentation 53, most recent 47  -Lactic acid on presentation elevated 5 1, most recent 3 5  -Initial valproic acid level WNL 71, most recent 38  -Lipid panel:  Cholesterol 128, triglycerides 93, HDL 38, LDL 71  -Hemoglobin A1c:  5 6    Subjective:   BP slightly elevated this morning with systolics in the 982Y  Today patient states he feels extremely nauseous  He admits to chest pain secondary to cough, as well as generalized weakness  Patient states he continues to feel lightheaded, which is normal for him, stating this is a residual deficit from his prior strokes  Denies SOB, headache, vision changes, emesis, abdominal pain, focal weakness or new/ worsening numbness  ROS:  12 point ROS performed, as stated above, all others negative  Vitals: Blood pressure (!) 176/94, pulse 88, temperature 100 1 °F (37 8 °C), temperature source Oral, resp  rate 13, height 5' 11" (1 803 m), weight 101 kg (223 lb 5 2 oz), SpO2 98 %  ,Body mass index is 31 15 kg/m²  Physical Exam   Constitutional: He is oriented to person, place, and time  He appears well-developed and well-nourished  No distress  HENT:   Head: Normocephalic and atraumatic  Eyes: Pupils are equal, round, and reactive to light  Conjunctivae and EOM are normal  Right eye exhibits no discharge  Left eye exhibits no discharge  No scleral icterus  Neck: Normal range of motion  Neck supple  Cardiovascular: Normal rate and regular rhythm  Pulmonary/Chest: Effort normal  No respiratory distress  Musculoskeletal: Normal range of motion  Neurological: He is alert and oriented to person, place, and time  Skin: Skin is warm and dry  No rash noted  He is not diaphoretic  No erythema  No pallor  Psychiatric: He has a normal mood and affect  His behavior is normal  Judgment and thought content normal    Nursing note and vitals reviewed      Neurologic Exam     Mental Status   Oriented to person, place, and time  Patient is alert, sitting up in chair, accompanied by mother, now extubated and off vEEG  Patient is oriented x 3  No dysarthria or aphasia noted, able to name all object provided, able to name the president, follows multistep commands, and answers all questions appropriately  Required prolonged period of time to perform simple calculations  Cranial Nerves     CN II   Visual fields full to confrontation  CN III, IV, VI   Pupils are equal, round, and reactive to light  Extraocular motions are normal    Nystagmus: none   Upgaze: normal  Downgaze: normal  Conjugate gaze: present    CN V   Facial sensation intact       CN VIII   Hearing: intact    CN IX, X   Palate: symmetric    CN XI   CN XI normal      CN XII   Tongue deviation: none  Ptosis noted in right eye, chronic per patient; otherwise no other facial asymmetry noted  Left facial twitching lasting 3-4 seconds noted, patient remained alert and talking throughout episode     Motor Exam   Muscle bulk: normal  Overall muscle tone: normal  Shaking noted in bilateral upper extremity with pronator drift testing   strength symmetric, 5/5 bilaterally  Bilateral upper and lower extremity strength testing 5/5 throughout except as noted:  Effort limited in left upper extremity secondary to pain, however appears to have 5/5 strength     Sensory Exam   Light touch normal    Decreased sensation to light touch in distal bilateral lower extremity secondary to neuropathy     Gait, Coordination, and Reflexes     Reflexes   Right plantar: equivocal  Left plantar: equivocal  Right ankle clonus: absent  Left ankle clonus: absent  Dysmetria noted in right upper extremity with finger-to-nose testing, WNL in left upper extremity  Left facial twitching noted, lasting 3-4 seconds, patient remained alert and speaking throughout event  Witnessed patient ambulating with walker, steps appeared slow, gait normal     Lab Results: I have personally reviewed pertinent reports    Recent Results (from the past 24 hour(s))   Fingerstick Glucose (POCT)    Collection Time: 02/23/20 11:30 AM   Result Value Ref Range    POC Glucose 158 (H) 65 - 140 mg/dl   Fingerstick Glucose (POCT)    Collection Time: 02/23/20  2:16 PM   Result Value Ref Range    POC Glucose 79 65 - 140 mg/dl   Fingerstick Glucose (POCT)    Collection Time: 02/23/20  2:52 PM   Result Value Ref Range    POC Glucose 72 65 - 140 mg/dl   Fingerstick Glucose (POCT)    Collection Time: 02/23/20  4:05 PM   Result Value Ref Range    POC Glucose 97 65 - 140 mg/dl   Fingerstick Glucose (POCT)    Collection Time: 02/23/20  5:15 PM   Result Value Ref Range    POC Glucose 200 (H) 65 - 140 mg/dl   Fingerstick Glucose (POCT)    Collection Time: 02/23/20  6:36 PM   Result Value Ref Range    POC Glucose 230 (H) 65 - 140 mg/dl   Fingerstick Glucose (POCT)    Collection Time: 02/23/20  8:13 PM   Result Value Ref Range    POC Glucose 223 (H) 65 - 140 mg/dl   Fingerstick Glucose (POCT)    Collection Time: 02/23/20 10:07 PM   Result Value Ref Range    POC Glucose 138 65 - 140 mg/dl   Fingerstick Glucose (POCT)    Collection Time: 02/24/20 12:09 AM   Result Value Ref Range    POC Glucose 181 (H) 65 - 140 mg/dl   Fingerstick Glucose (POCT)    Collection Time: 02/24/20  2:16 AM   Result Value Ref Range    POC Glucose 187 (H) 65 - 140 mg/dl   Fingerstick Glucose (POCT)    Collection Time: 02/24/20  4:12 AM   Result Value Ref Range    POC Glucose 145 (H) 65 - 140 mg/dl   Basic metabolic panel    Collection Time: 02/24/20  6:01 AM   Result Value Ref Range    Sodium 142 136 - 145 mmol/L    Potassium 3 7 3 5 - 5 3 mmol/L    Chloride 105 100 - 108 mmol/L    CO2 23 21 - 32 mmol/L    ANION GAP 14 (H) 4 - 13 mmol/L    BUN 47 (H) 5 - 25 mg/dL    Creatinine 15 40 (H) 0 60 - 1 30 mg/dL    Glucose 139 65 - 140 mg/dL    Calcium 9 7 8 3 - 10 1 mg/dL    eGFR 4 ml/min/1 73sq m   CBC and Platelet    Collection Time: 02/24/20  6:01 AM   Result Value Ref Range    WBC 9 90 4 31 - 10 16 Thousand/uL    RBC 2 87 (L) 3 88 - 5 62 Million/uL    Hemoglobin 9 1 (L) 12 0 - 17 0 g/dL    Hematocrit 28 1 (L) 36 5 - 49 3 %    MCV 98 82 - 98 fL    MCH 31 7 26 8 - 34 3 pg    MCHC 32 4 31 4 - 37 4 g/dL    RDW 15 1 11 6 - 15 1 %    Platelets 348 154 - 718 Thousands/uL    MPV 10 3 8 9 - 12 7 fL   Fingerstick Glucose (POCT)    Collection Time: 02/24/20  7:55 AM   Result Value Ref Range    POC Glucose 117 65 - 140 mg/dl   Fingerstick Glucose (POCT)    Collection Time: 02/24/20  9:48 AM   Result Value Ref Range    POC Glucose 150 (H) 65 - 140 mg/dl   ]  Imaging Studies: I have personally reviewed pertinent reports and I have personally reviewed pertinent films in PACS  EKG, Pathology, and Other Studies: I have personally reviewed pertinent reports  VTE Prophylaxis: Heparin    Counseling / Coordination of Care  Total time spent today 32 minutes critical care time in ICU  Greater than 50% of total time was spent with the patient and/or family counseling and/or coordination of care  A description of the counseling/coordination of care:  Patient was seen and evaluated  Discussed with attending  Chart reviewed thoroughly including laboratory and imaging studies  Plan of care discussed with patient and primary team   Discussed with patient the possible etiology of his provoked seizure, likely uncontrolled hypertension  Recommend the patient follow up with outpatient epileptologist and nephrology for HTN medication management

## 2020-02-24 NOTE — PHYSICAL THERAPY NOTE
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,      Binocular visual disturbance    Vitamin D deficiency    Homozygous MTHFR mutation C677T ([facility] )    End-stage renal disease on peritoneal dialysis ([place] [address] )    Persistent proteinuria    Bilateral leg edema    Ataxia    Left atrial dilation    Renovascular hypertension    Anemia    Heterozygous for prothrombin [id number] mutation ([place] [address] )    Peritoneal dialysis catheter in place ([place] [address] )    Dyslipidemia    Strabismus    Diarrhea    Current moderate episode of major depressive disorder without prior episode ([place] [address] )    Environmental and seasonal allergies    Pruritus    Obesity (BMI 30 0-34  9)    Gastroparesis diabeticorum (HCC)    Peripheral polyneuropathy    Elevated TSH    Incidental lung nodule, > 3mm and < 8mm    Sepsis (HCC)    Ambulatory dysfunction    Vertigo    Seizure-like activity (HCC)       Past Medical History  Past Medical History:   Diagnosis Date    Acute kidney injury ([place] [address] )     Anxiety     Cerebellar stroke side undetermined 2015 2015,1/2018    Diabetes type 1, controlled ([place] [address] )     IDDM    Diarrhea     Gastroparesis     History of shingles 2010    History of transfusion 02/2018    Hypertension     Muscle weakness     general unsteadiness    Retinopathy        Past Surgical History  Past Surgical History:   Procedure Laterality Date    CARDIAC LOOP RECORDER  05/2018    EGD      EYE SURGERY      PERITONEAL CATHETER INSERTION N/A 8/27/2018    Procedure: UNROOF PD CATHETER;  Surgeon: Munir Jessica DO;  [facility];  Service: General    MD ESOPHAGOGASTRODUODENOSCOPY TRANSORAL DIAGNOSTIC N/A 4/18/2019    Procedure: ESOPHAGOGASTRODUODENOSCOPY Gait pattern Excessively slow; Shuffling;Decreased foot clearance; Ataxia   Gait Assistance 3  Moderate assist   Additional items Assist x 2   Assistive Device Rolling walker   Distance 4'   Balance   Static Sitting Fair   Dynamic Sitting Fair -   Static Standing Poor +   Dynamic Standing Poor   Ambulatory Poor   Endurance Deficit   Endurance Deficit Yes   Endurance Deficit Description limited by fatigue, generalized weakness   Activity Tolerance   Activity Tolerance Patient limited by fatigue;Patient limited by pain   Medical Staff Made Aware OT and CM for D/C planning   Nurse Made Aware yes, nsg gave clearance to work with pt, updated on pt progress   Assessment   Prognosis Good   Problem List Decreased strength;Decreased endurance; Impaired balance;Decreased mobility; Decreased safety awareness;Pain   Assessment Pt is 37 y o  male seen for PT evaluation s/p admit to [facility] on 2/21/2020 w/ Seizure-like activity ([address] )  PT consulted to assess pt's functional mobility and d/c needs  Order placed for PT eval and tx, w/ up and OOB as tolerated order  Comorbidities affecting pt's physical performance at time of assessment include: CVA with residual dizziness, ESRD on PD, DM1, neurpoathy  PTA, pt was ambulates community distances and elevations, lives in multi-level home, has 2 NIRU and on disability  Personal factors affecting pt at time of IE include: ambulating w/ assistive device, stairs to enter home, inability to ambulate household distances, limited home support, positive fall history, impulsivity, unable to perform physical activity, inability to perform IADLs and inability to perform ADLs   Please find objective findings from PT assessment regarding body systems outlined above with impairments and limitations including weakness, decreased ROM, impaired balance, decreased endurance, impaired coordination, gait deviations, pain, decreased activity tolerance, decreased functional mobility tolerance, decreased safety awareness and fall risk  Pt required increased time to complete bed mobility  Noted minimal dizziness at EOB although pt reports that is not uncommon at baseline since prior CVA  Required increased A for transfers with B/L LE weakness  Noted balance deficits during ambulation  Mildly ataxic gait and B/L LE neuropathy requiring increased time for each step  Additional ambulation limited at this time with fatigue  The following objective measures performed on IE also reveal limitations: Barthel Index: 45/100  Pt's clinical presentation is currently unstable/unpredictable seen in pt's presentation of critical care monitoring  Pt to benefit from continued PT tx to address deficits as defined above and maximize level of functional independent mobility and consistency  From PT/mobility standpoint, recommendation at time of d/c would be IP rehab pending progress in order to facilitate return to PLOF  Barriers to Discharge Decreased caregiver support; Inaccessible home environment   Goals   Patient Goals To get stronger   STG Expiration Date 03/07/20   Short Term Goal #1 1  Complete bed mobility and transfers I to decrease need for caregiver in home  2  Ambulate 300' I to complete household and community mobility without A  3  Improve dynamic balance to good to decrease need for UE support during ambulation  4  Be educated & demonstate 12 steps to be able to enter home without A  Plan   Treatment/Interventions LE strengthening/ROM; Functional transfer training; Therapeutic exercise;Patient/family training; Endurance training;Gait training;Bed mobility;OT;Spoke to case management;Spoke to nursing;Equipment eval/education   PT Frequency   (3-5x/wk)   Recommendation   Recommendation Post acute IP rehab   PT - OK to Discharge Yes  (to rehab when medically stable)   Barthel Index   Feeding 10   Bathing 0   Grooming Score 5   Dressing Score 5   Bladder Score 10   Bowels Score 5   Toilet Use Score 5   Transfers (Bed/Chair) Score 5   Mobility (Level Surface) Score 0   Stairs Score 0   Barthel Index Score 45           Jj Vincent, PT

## 2020-02-24 NOTE — PROGRESS NOTES
NEPHROLOGY PROGRESS NOTE   Fransisco Mckeon 37 y o  male MRN: [id number]  Unit/Bed#: ICU 02 Encounter: [id number]  Reason for Consult:  END-STAGE RENAL DISEASE    ASSESSMENT/PLAN:  1  End-stage renal disease, currently on CCPD, 4 cycles 2 2 L with last fill of 1 5 L over 10 hours  2  Seizure-like activity, Neurology following  3  Anemia of chronic disease, HETAL on hold given possible seizure disorder  4  Mineral bone disorder, continue with calcium acetate, Sensipar and vitamin-D replacement  5  Hypertension, suboptimal, continue with clonidine, hydralazine, labetalol and torsemide, adjust labetalol 200 mg b i d   6  Diabetes, type 1, as per critical care  7  Homozygous MTHFR mutation    PLAN:  · Continue with CCPD, 1 9 L ultrafiltration yesterday  · Blood pressure suboptimal, adjusted labetalol to 200 mg b i d   · Potassium placement x1, potassium 3 7    SUBJECTIVE:  Seen examined  Patient awake alert  Overall feels tired fatigue  Nausea seems improved slightly improved appetite today  Denies any chest pain shortness of breath  Denies abdominal pain  Review of Systems    OBJECTIVE:  Current Weight: Weight - Scale: 101 kg (223 lb 5 2 oz)  Vitals:    02/24/20 0510 02/24/20 0600 02/24/20 0610 02/24/20 0843   BP: (!) 179/83  (!) 176/94    Pulse: 92  88    Resp:       Temp:       TempSrc:       SpO2: 96%  98%    Weight:  102 kg (225 lb 1 4 oz)  101 kg (223 lb 5 2 oz)   Height:           Intake/Output Summary (Last 24 hours) at 2/24/2020 0942  Last data filed at 2/24/2020 0501  Gross per 24 hour   Intake 1944 71 ml   Output 0 ml   Net 1944 71 ml       Physical Exam   Constitutional: No distress  HENT:   Head: Normocephalic  Eyes: No scleral icterus  Cardiovascular: Normal rate and regular rhythm  Pulmonary/Chest: Effort normal and breath sounds normal    Abdominal: Soft  There is no tenderness  Musculoskeletal: He exhibits edema  Neurological: He is alert  Skin: Skin is warm and dry   No rash noted        Medications:    Current Facility-Administered Medications:     acetaminophen (TYLENOL) tablet 650 mg, 650 mg, Oral, Q8H PRN, Jennifer Cedeño MD, 650 mg at 02/23/20 1610    aspirin chewable tablet 81 mg, 81 mg, Oral, Daily, GATO GuevaraNP, 81 mg at 02/24/20 2611    atorvastatin (LIPITOR) tablet 40 mg, 40 mg, Oral, HS, GATO GuevaraNP    calcium acetate (PHOSLO) capsule 667 mg, 667 mg, Oral, TID With Meals, DEISI Guevara    chlorhexidine (PERIDEX) 0 12 % oral rinse 15 mL, 15 mL, Swish & Spit, Q12H [facility], DEISI Guevara, 15 mL at 02/24/20 4169    cholecalciferol (VITAMIN D3) tablet 2,000 Units, 2,000 Units, Oral, Daily, DEISI Guevara, 2,000 Units at 02/24/20 7872    cinacalcet (SENSIPAR) tablet 90 mg, 90 mg, Oral, Daily, GATO GuevaraNP, 90 mg at 02/24/20 4154    cloNIDine (CATAPRES-TTS-2) 0 2 mg/24 hr TD weekly patch, 0 2 mg, Transdermal, Weekly, Favio Renner DO, 0 2 mg at 02/22/20 2028    diphenhydrAMINE (BENADRYL) 50 mg in sodium chloride 0 9 % 50 mL IVPB, 50 mg, Intravenous, Daily PRN, Jennifer Cedeño MD    erythromycin base tablet 250 mg, 250 mg, Oral, TID AC, Jose M Bravo MD, 250 mg at 02/24/20 8696    famotidine (PEPCID) tablet 20 mg, 20 mg, Oral, HS, GATO GuevaraNP, 20 mg at 02/23/20 2116    gabapentin (NEURONTIN) capsule 100 mg, 100 mg, Oral, HS, DEISI Guevara, 100 mg at 02/23/20 2116    gentamicin (GARAMYCIN) 0 1 % ointment 1 application, 1 application, Topical, Daily, DEISI Guevara, 1 application at 34/27/04 2132    haloperidol lactate (HALDOL) injection 2 mg, 2 mg, Intramuscular, Daily PRN, Jennifer Cedeño MD    heparin (porcine) subcutaneous injection 5,000 Units, 5,000 Units, Subcutaneous, Q8H [facility], DEISI Guevara, 5,000 Units at 02/24/20 0551    hydrALAZINE (APRESOLINE) injection 20 mg, 20 mg, Intravenous, Q4H PRN, Favio Renner DO, 20 mg at 02/24/20 0550    hydrALAZINE (APRESOLINE) tablet 50 mg, 50 mg, Oral, TID, Favio Banai, DO, 50 mg at 02/24/20 0918    insulin regular (HumuLIN R,NovoLIN R) 1 Units/mL in sodium chloride 0 9 % 100 mL infusion, 0 3-21 Units/hr, Intravenous, Titrated, Wendy Lien, CRNP, Last Rate: 1 5 mL/hr at 02/24/20 0600, 1 5 Units/hr at 02/24/20 0600    labetalol (NORMODYNE) tablet 150 mg, 150 mg, Oral, Q12H [address], Favio Banai, DO, 150 mg at 02/24/20 0919    Labetalol HCl (NORMODYNE) injection 20 mg, 20 mg, Intravenous, Q2H PRN, Favio Banai, DO, 20 mg at 02/24/20 0321    lactobacillus acidophilus-bulgaricus [facility]) packet 1 packet, 1 packet, Oral, HS, Wendy Lien, CRNP    lisinopril (ZESTRIL) tablet 40 mg, 40 mg, Oral, Daily, Susanna Oliver MD, 40 mg at 02/24/20 2933    niCARdipine (CARDENE) 50 mg (DOUBLE CONCENTRATION) IV in sodium chloride 0 9% 250 mL, 1-15 mg/hr, Intravenous, Titrated, Shahbaz Russ MD, Stopped at 02/23/20 2301    NIFEdipine (PROCARDIA XL) 24 hr tablet 60 mg, 60 mg, Oral, Daily, Susanna Oliver MD    ondansetron [facility]) 8 mg in sodium chloride 0 9 % 50 mL IVPB, 8 mg, Intravenous, Q6H, Willard Fish MD, Last Rate: 200 mL/hr at 02/24/20 0926, 8 mg at 02/24/20 0926    sodium chloride (concentrated) 154 mEq in dextrose 10 % 1,000 mL infusion, 50 mL/hr, Intravenous, Continuous, Dayne A Paster, DO, Last Rate: 50 mL/hr at 02/23/20 1600, 50 mL/hr at 02/23/20 1600    torsemide (DEMADEX) tablet 100 mg, 100 mg, Oral, Daily With Lunch, Favio Renner DO    valproate (DEPACON) 750 mg in sodium chloride 0 9 % 50 mL IVPB, 750 mg, Intravenous, Q8H [address], Favio Banai, DO, Last Rate: 50 mL/hr at 02/24/20 0550, 750 mg at 02/24/20 0550    Laboratory Results:  Results from last 7 days   Lab Units 02/24/20  0601 02/23/20  0437 02/22/20  0501 02/21/20  1150 02/21/20  0945 02/21/20  0850 02/21/20  0429 02/20/20  1804   WBC Thousand/uL 9 90 10 91* 12 32* 8 65 7 82  --  9 21 7 85   HEMOGLOBIN g/dL 9 1* 9 5* 9 7* 9 2* 9 8*  --  11 1* 10 4*   I STAT HEMOGLOBIN g/dl --   --   --   --   --  10 2*  --   --    HEMATOCRIT % 28 1* 29 2* 30 1* 27 2* 29 0*  --  34 1* 32 6*   HEMATOCRIT, ISTAT %  --   --   --   --   --  30*  --   --    PLATELETS Thousands/uL 242 254 249 253 261  --  271 235   POTASSIUM mmol/L 3 7 3 7 4 9 4 8 5 4*  --  4 6 5 6*   CHLORIDE mmol/L 105 103 100 100 100  --  101 101   CO2 mmol/L 23 24 25 24 24  --  27 29   CO2, I-STAT mmol/L  --   --   --   --   --  24  --   --    BUN mg/dL 47* 52* 54* 50* 50*  --  52* 53*   CREATININE mg/dL 15 40* 15 40* 16 20* 15 20* 15 82*  --  15 44* 15 32*   CALCIUM mg/dL 9 7 9 2 8 6 8 9 9 0  --  9 0 8 7   MAGNESIUM mg/dL  --   --   --  2 8* 2 8*  --   --  2 5   GLUCOSE, ISTAT mg/dl  --   --   --   --   --  275*  --   --

## 2020-02-24 NOTE — OCCUPATIONAL THERAPY NOTE
Occupational Therapy Evaluation     Patient Name: Saeed Hoskins  LZEVA'J Date: 2/24/2020  Problem List  Principal Problem:    Seizure-like activity ([facility] )  Active Problems:    Type 1 diabetes mellitus with hypoglycemia ([facility] )    History of lacunar cerebrovascular accident (CVA)    Homozygous MTHFR mutation C677T ([facility] )    End-stage renal disease on peritoneal dialysis ([place] [address] )    Renovascular hypertension    Dyslipidemia    Past Medical History  Past Medical History:   Diagnosis Date    Acute kidney injury ([place] [address] )     Anxiety     Cerebellar stroke side undetermined 2015 2015,1/2018    Diabetes type 1, controlled ([place] [address] )     IDDM    Diarrhea     Gastroparesis     History of shingles 2010    History of transfusion 02/2018    Hypertension     Muscle weakness     general unsteadiness    Retinopathy      Past Surgical History  Past Surgical History:   Procedure Laterality Date    CARDIAC LOOP RECORDER  05/2018    EGD      EYE SURGERY      PERITONEAL CATHETER INSERTION N/A 8/27/2018    Procedure: UNROOF PD CATHETER;  Surgeon: Gurmeet De León DO;  [facility];  Service: General    DE ESOPHAGOGASTRODUODENOSCOPY TRANSORAL DIAGNOSTIC N/A 4/18/2019    Procedure: ESOPHAGOGASTRODUODENOSCOPY (EGD); Surgeon: Roman Redd MD;  Location: [facility]; Service: Gastroenterology    DE LAP INSERTION TUNNELED INTRAPERITONEAL CATHETER N/A 8/6/2018    Procedure: LAPAROSCOPIC PD CATHETER PLACEMENT;  Surgeon: Gurmeet De León DO;  [facility];  Service: General    TONSILLECTOMY               02/24/20 0912   Note Type   Note type Eval/Treat   Restrictions/Precautions   Weight Bearing Precautions Per Order No   Other Precautions Cognitive; Chair Alarm; Bed Alarm;Multiple lines;Telemetry; Fall Risk;Pain   Pain Assessment   Pain Assessment No/denies pain   Pain Score No Pain   Home Living   Type of [address] [place] [address] Two level; Other (Comment); Bed/bath upstairs;1/2 bath on main level;Performs ADLs on one level  (2 NIRU)   Bathroom Shower/Tub Walk-in shower  (and tub/shower)   Bathroom Toilet Standard   Bathroom Equipment Grab bars in shower; Tub transfer bench;Grab bars around toilet; Shower chair;Commode   Home Equipment Cane;Walker  (only uses cane outside the home)   Additional Comments pt reports living in 2 SH, 2 NIRU, 1/ 2bath 1st floor, main bed/bath 2nd floor w/ full flight (w/ landing) to get up  Prior Function   Level of [place] Independent with ADLs and functional mobility   Lives With Other (Comment)  (parents)   Receives Help From Family   ADL Assistance Independent   IADLs Independent   Falls in the last 6 months 1 to 4  (2)   Vocational On disability   Comments Pt reports being I w/ ADLS, IADLS, transfers and functional mobility; uses SPC for outside only   Lifestyle   Autonomy I ADLS, IADLS, transfers and functional mobility PTA   Reciprocal Relationships Pt lives w/ parents; they are home as needed   Service to Others Pt does not work   Intrinsic Gratification Pt enjoy sports ([employer] and [employer]); and enjoys TV (political shows)   Psychosocial   Psychosocial (WDL) WDL   Length of Time/Family Visitation 16-30 min  (mother)   ADL   Eating Assistance 5  Supervision/Setup   Grooming Assistance 5  Supervision/Setup   UB Bathing Assistance 4  Minimal Assistance   LB Bathing Assistance 3  Moderate [address] 4  [address] 3  Moderate [address]  3  Moderate Assistance   Functional Assistance 3  Moderate Assistance   Functional Deficit Steadying;Supervision/safety;Verbal cueing; Increased time to complete  (Ax2)   Bed Mobility   Supine to Sit 4  Minimal assistance   Additional items Assist x 1;HOB elevated; Increased time required;Verbal cues;LE management   Sit to Supine Unable to assess   Additional Comments pt went from supine to sit w/ Min A x1 for UB support and LE managment, HOB elevated for assist  Pt sat EOB w/ Fair sitting balance/trunk control   Transfers   Sit to Stand 3  Moderate assistance   Additional items Assist x 2; Increased time required;Verbal cues   Stand to Sit 3  Moderate assistance   Additional items Assist x 2; Increased time required;Verbal cues   Additional Comments Pt performed sit-stand from EOB w/ Mod A x2 for moderate force production into standing, +VC for safety/proper technique   Functional Mobility   Functional Mobility 3  Moderate assistance   Additional Comments Pt took few small steps from EOB to chair w/ Mod A x2, HHA used, +VC for safety  Additional items Hand hold assistance   Balance   Static Sitting Fair   Dynamic Sitting Fair -   Static Standing Poor +   Dynamic Standing Poor   Ambulatory Poor   Activity Tolerance   Activity Tolerance Patient limited by fatigue;Patient limited by pain   Medical Staff Made Aware PT   Nurse Made Aware yes   RUE Assessment   RUE Assessment WFL   LUE Assessment   LUE Assessment WFL   Hand Function   Gross Motor Coordination Functional   Fine Motor Coordination Functional   Sensation   Light Touch Partial deficits in the RLE;Partial deficits in the LLE  (neuropathy B/L feet)   Vision-Basic Assessment   Current Vision Wears glasses all the time   Vision - Complex Assessment   Ocular Range of Motion [facility]   Perception   Inattention/Neglect Appears intact   Cognition   Overall Cognitive Status WFL   Arousal/Participation Responsive; Cooperative   Attention Within functional limits   Orientation Level Oriented X4   Memory Within functional limits   Following Commands Follows one step commands without difficulty   Comments Pt is pleasant and cooperative; needs occasional cue for safety; has some decreased insight into deficits   Assessment   Limitation Decreased ADL status; Decreased Safe judgement during ADL;Decreased endurance;Decreased self-care trans;Decreased high-level ADLs; Decreased sensation   Prognosis Fair   Assessment Pt is a 36 y/o male seen for OT eval s/p adm to [facility] as a transfer from [facility] where he originally presented w/ vertigo and lightheadedness for 2 days intermittently  The following day pt has a seizure; intubated and transferred to [facility] for continuous EEG monitoring  Pt is dx'd w/ seizure like activity  Pt  has a past medical history of Acute kidney injury ([facility] ), Anxiety, Cerebellar stroke side undetermined 2015 (2015,1/2018), Diabetes type 1, controlled ([facility] ), Diarrhea, Gastroparesis, History of shingles (2010), History of transfusion (02/2018), Hypertension, Muscle weakness, and Retinopathy  Pt with active OT orders and up with assistance  orders  Pt lives with parents in 2 , 1-2 NIRU, 1/2 bath 1st floor, main bed/bath 2nd floor w/ 13 steps up and landing in between  Pt was I w/  ADLS and IADLS, does not drive, & required use of DME PTA including cane for use outside the home; also has available a tub-bench, s/c, grab bars and RW  Pt is currently demonstrating the following occupational deficits: Min A UB ADLS, Mod A LB ADLS, Mod A x2 transfers and functional mobility w/ HHA and +VC for safety  These deficits that are impacting pt's baseline areas of occupation are a result of the following impairments: pain, endurance, activity tolerance, functional mobility, forward functional reach, balance, trunk control, functional standing tolerance, decreased I w/ ADLS/IADLS, sensation deficits, decreased safety awareness and coordination deficits  The following Occupational Performance Areas to address include: eating, grooming, bathing/shower, toilet hygiene, dressing, socialization, health maintenance, functional mobility, clothing management and household maintenance  Pt scored overall 45/100 on the Barthel Index  Based on the aforementioned OT evaluation, functional performance deficits, and assessments, pt has been identified as a high complexity evaluation  Recommend STR upon D/C   Pt to continue to benefit from acute immediate OT services to address the following goals 3-5x/week to  w/in 10-14 days:    Goals   Patient Goals to get stronger to be more independent   LTG Time Frame 10-14   Long Term Goal #1 see below listed goals   Plan   Treatment Interventions ADL retraining;Functional transfer training;UE strengthening/ROM; Endurance training;Cognitive reorientation;Patient/family training;Equipment evaluation/education; Compensatory technique education;Continued evaluation; Energy conservation; Activityengagement   Goal Expiration Date 20   OT Frequency 3-5x/wk   Recommendation   OT Discharge Recommendation Short Term Rehab   OT - OK to Discharge Yes  (when medically stable)   Barthel Index   Feeding 10   Bathing 0   Grooming Score 5   Dressing Score 5   Bladder Score 10   Bowels Score 5   Toilet Use Score 5   Transfers (Bed/Chair) Score 5   Mobility (Level Surface) Score 0   Stairs Score 0   Barthel Index Score 45        GOALS    1) Pt will improve activity tolerance to G for min 30 min txment sessions for increase engagement in functional tasks  2) Pt will complete UB/LB dressing/self care w/ S using adaptive device and DME as needed  3) Pt will complete bathing w/ S w/ use of AE and DME as needed  4) Pt will complete toileting w/ S w/ G hygiene/thoroughness using DME as needed  5) Pt will improve functional transfers to S on/off all surfaces using DME as needed w/ G balance/safety   6) Pt will improve functional mobility during ADL/IADL/leisure tasks to S using DME as needed w/ G balance/safety   7) Pt will improve standing balance to S for 10 mins w/ use of DME as needed to increase engagement in sink level ADL tasks  8) Pt will be attentive 100% of the time during ongoing cognitive assessment w/ G participation to assist w/ safe d/c planning/recommendations  9) Pt will demonstrate G carryover of pt/caregiver education and training as appropriate w/o cues w/ good tolerance to increase safety during functional tasks  10) Pt will demonstrate 100% carryover of energy conservation techniques t/o functional I/ADL/leisure tasks w/o cues s/p skilled education to increase endurance during functional tasks       Jacqueline Galindo MS, OTR/L

## 2020-02-24 NOTE — PLAN OF CARE
Problem: PHYSICAL THERAPY ADULT  Goal: Performs mobility at highest level of function for planned discharge setting  See evaluation for individualized goals  Description  Treatment/Interventions: LE strengthening/ROM, Functional transfer training, Therapeutic exercise, Patient/family training, Endurance training, Gait training, Bed mobility, OT, Spoke to case management, Spoke to nursing, Equipment eval/education          See flowsheet documentation for full assessment, interventions and recommendations  Note:   Prognosis: Good  Problem List: Decreased strength, Decreased endurance, Impaired balance, Decreased mobility, Decreased safety awareness, Pain  Assessment: Pt is 37 y o  male seen for PT evaluation s/p admit to [facility] on 2/21/2020 w/ Seizure-like activity ([facility] [address] )  PT consulted to assess pt's functional mobility and d/c needs  Order placed for PT eval and tx, w/ up and OOB as tolerated order  Comorbidities affecting pt's physical performance at time of assessment include: CVA with residual dizziness, ESRD on PD, DM1, neurpoathy  PTA, pt was ambulates community distances and elevations, lives in multi-level home, has 2 NIRU and on disability  Personal factors affecting pt at time of IE include: ambulating w/ assistive device, stairs to enter home, inability to ambulate household distances, limited home support, positive fall history, impulsivity, unable to perform physical activity, inability to perform IADLs and inability to perform ADLs  Please find objective findings from PT assessment regarding body systems outlined above with impairments and limitations including weakness, decreased ROM, impaired balance, decreased endurance, impaired coordination, gait deviations, pain, decreased activity tolerance, decreased functional mobility tolerance, decreased safety awareness and fall risk  Pt required increased time to complete bed mobility   Noted minimal dizziness at EOB although pt reports that is not uncommon at baseline since prior CVA  Required increased A for transfers with B/L LE weakness  Noted balance deficits during ambulation  Mildly ataxic gait and B/L LE neuropathy requiring increased time for each step  Additional ambulation limited at this time with fatigue  The following objective measures performed on IE also reveal limitations: Barthel Index: 45/100  Pt's clinical presentation is currently unstable/unpredictable seen in pt's presentation of critical care monitoring  Pt to benefit from continued PT tx to address deficits as defined above and maximize level of functional independent mobility and consistency  From PT/mobility standpoint, recommendation at time of d/c would be IP rehab pending progress in order to facilitate return to PLOF  Barriers to Discharge: Decreased caregiver support, Inaccessible home environment     Recommendation: Post acute IP rehab     PT - OK to Discharge: Yes(to rehab when medically stable)    See flowsheet documentation for full assessment

## 2020-02-24 NOTE — PLAN OF CARE
Problem: OCCUPATIONAL THERAPY ADULT  Goal: Performs self-care activities at highest level of function for planned discharge setting  See evaluation for individualized goals  Description  Treatment Interventions: ADL retraining, Functional transfer training, UE strengthening/ROM, Endurance training, Cognitive reorientation, Patient/family training, Equipment evaluation/education, Compensatory technique education, Continued evaluation, Energy conservation, Activityengagement          See flowsheet documentation for full assessment, interventions and recommendations  Note:   Limitation: Decreased ADL status, Decreased Safe judgement during ADL, Decreased endurance, Decreased self-care trans, Decreased high-level ADLs, Decreased sensation  Prognosis: Fair  Assessment: Pt is a 38 y/o male seen for OT eval s/p adm to [facility] as a transfer from [facility] where he originally presented w/ vertigo and lightheadedness for 2 days intermittently  The following day pt has a seizure; intubated and transferred to [facility] for continuous EEG monitoring  Pt is dx'd w/ seizure like activity  Pt  has a past medical history of Acute kidney injury ([facility] [address] ), Anxiety, Cerebellar stroke side undetermined 2015 (2015,1/2018), [facility] Diarrhea, Gastroparesis, History of shingles (2010), History of transfusion (02/2018), Hypertension, Muscle weakness, and Retinopathy  Pt with active OT orders and up with assistance  orders  Pt lives with parents in 2 , 1-2 NIRU, 1/2 bath 1st floor, main bed/bath 2nd floor w/ 13 steps up and landing in between  Pt was I w/  ADLS and IADLS, does not drive, & required use of DME PTA including cane for use outside the home; also has available a tub-bench, s/c, grab bars and RW  Pt is currently demonstrating the following occupational deficits: Min A UB ADLS, Mod A LB ADLS, Mod A x2 transfers and functional mobility w/ HHA and +VC for safety   These deficits that are impacting pt's baseline areas of occupation are a result of the following impairments: pain, endurance, activity tolerance, functional mobility, forward functional reach, balance, trunk control, functional standing tolerance, decreased I w/ ADLS/IADLS, sensation deficits, decreased safety awareness and coordination deficits  The following Occupational Performance Areas to address include: eating, grooming, bathing/shower, toilet hygiene, dressing, socialization, health maintenance, functional mobility, clothing management and household maintenance  Pt scored overall 45/100 on the Barthel Index  Based on the aforementioned OT evaluation, functional performance deficits, and assessments, pt has been identified as a high complexity evaluation  Recommend STR upon D/C   Pt to continue to benefit from acute immediate OT services to address the following goals 3-5x/week to  w/in 10-14 days:      OT Discharge Recommendation: Short Term Rehab  OT - OK to Discharge: Yes(when medically stable)     Danette Kim MS, OTR/L

## 2020-02-24 NOTE — PROGRESS NOTES
Daily Progress Note - Critical Care   Tacos Mckeon 37 y o  male MRN: [id number]  Unit/Bed#: ICU 02 Encounter: [id number]        ----------------------------------------------------------------------------------------  HPI:  37year old male with history of Type 1 insulin dependent diabetes complicated by Nephropathy, gastroparesis, retinopathy, and neuropathy  Patient also has a history of CVA x2, ESRD on PD due to T1DM, HTN, dyslipidemia, and he is currently on the pancrease/renal transplant list at [facility]  He presented to [facility] on 2/20/2020 with complaints of vertigo and lightheadedness for 2 days intermittently  He also had word finding difficulty but no slurred speech, occipital headache, and nausea  He noted that he felt he was twitching more than usual  The following day the patient was found to have seizures  He received 6mg of Ativan, loaded with Depacon 20mg/kg and started on 5mg/kg on Depacon q 8hrs  Patient was successfully intubated and transferred to [facility] for continuous EEG monitoring  24hr events:   -Cardene gtt discontinued overnight  Patient had episodes of elevated BP overnight (186/88) improved with 1 dose of Hydralazine 20mg to 153/76; increased BP in the morning 179/83  -Received total of 3 doses of PRN (2 doses of Hydralazine 20mg and 1 dose of Labetalol 20mg)    ---------------------------------------------------------------------------------------  SUBJECTIVE  Patient seen and examined at bedside  Alert and oriented to person, place, and time as well as following commands  He reports no issues overnight and denies any headache, dizziness, chest pain, shortness of breath, abdominal pain, nausea, vomiting  Review of Systems   Constitutional: Negative for chills and fever  HENT: Negative for congestion and rhinorrhea  Eyes: Negative for visual disturbance  Respiratory: Negative for cough and shortness of breath  Cardiovascular: Negative for chest pain and palpitations  Gastrointestinal: Negative for abdominal pain, constipation, diarrhea, nausea and vomiting  Genitourinary: Negative for difficulty urinating and hematuria  Musculoskeletal: Negative for back pain  Neurological: Negative for dizziness, tremors and headaches       Review of systems was reviewed and negative unless stated above in HPI/24-hour events   ---------------------------------------------------------------------------------------  Assessment and Plan:    Neuro:    Diagnosis: Status Epilepticus 2/2 unknown etiology  o Plan: Continue Depacon 750mg q 8 hours, per Neurology once taking PO medications can change to oral medications  o Remains extubated, awake and alert, as well as following commands      CV:    Diagnosis: Renovascular Hypertension  o Double concentrated Cardene drop discontinued overnight  o Plan: Continue home Labetalol 150mg BID, Hydralazine 50mg TID, Clonidine 0 2mg transdermal patch  o PRN: Labetalol and Hydralazine        Pulm:   Diagnosis: No Active Issues  o Extubated on 2/22, remains on room air saturating in high 90s  o Plan: Supplemental oxygen to maintain oxygen saturation above 92%    GI:    PPX: Pepcid PO   Diagnosis: History of Gastroparesis  o Plan: Continue Erythromycin, Zofran, Advance diet as tolerated  o If refractory nausea: 1x Haldol or 1x Bendadryl  o If unresolved, non-urgent GI consultation   Diet plan: Diabetic diet        :    Diagnosis: ESRD on Peritoneal Dialysis  o Per Nephrology, gets usual peritoneal dialysis with CCPD follows at [facility]  o PD yesterday, access via PD catheter  o Nephrology following, appreciate recs     D/C'ed Hoffman Catheter, now on urinary retention protocol  o UOP: 0  o Plan: continue urinary retention protocol      F/E/N:    Plan: continue to monitor and replete electrolytes as needed to maintain K>4, Phos >3, Mag >2  o K at 3 7 today, will replete       Heme/Onc:    Diagnosis: No Active Issues  o Plan: Trend Hgb and Plts o Per Nephrology, maintain Hgb greater than 8  o Hgb: at 9 1 today   DVT PPx: Subcutaneous Heparin      Endo:    Diagnosis: Type 1 DM on insulin   o Plan: Continue Insulin gtt Algorithm #1  o Transition to SSI with basal coverage of Lantus 12U in AM and 15 U in PM, Lispro 5U with meals when able to tolerate diet      ID:    Diagnosis: No active issues  o Plan: continue to monitor fever curve and WBC         MSK/Skin:    Diagnosis: No active issues  o Plan: PT/ OT no applicable        Disposition: Continue Critical Care   Code Status: Level 1 - Full Code  ---------------------------------------------------------------------------------------  ICU CORE MEASURES    Prophylaxis   VTE Pharmacologic Prophylaxis: Heparin Drip  VTE Mechanical Prophylaxis: sequential compression device  Stress Ulcer Prophylaxis: Famotidine PO    ABCDE Protocol (if indicated)  Plan to perform spontaneous awakening trial today? Not applicable  Plan to perform spontaneous breathing trial today? Not applicable  Obvious barriers to extubation? Not applicable  CAM-ICU: Negative    Invasive Devices Review  Invasive Devices     Peripheral Intravenous Line            Peripheral IV 20 Left Antecubital 3 days    Peripheral IV 20 Left;Ventral (anterior) Hand 2 days    Peripheral IV 20 Right Wrist 1 day              Can any invasive devices be discontinued today?  No  ---------------------------------------------------------------------------------------  OBJECTIVE    Vitals   Vitals:    20 0210 20 0430 20 0510 20 0600   BP: 158/83 162/73 (!) 179/83    Pulse: 94 82 92    Resp:       Temp:  100 1 °F (37 8 °C)     TempSrc:  Oral     SpO2: 97% 99% 96%    Weight:    102 kg (225 lb 1 4 oz)   Height:         Temp (24hrs), Av 6 °F (37 6 °C), Min:98 9 °F (37 2 °C), Max:100 1 °F (37 8 °C)  Current: Temperature: 100 1 °F (37 8 °C)        Temp:  [98 9 °F (37 2 °C)-100 1 °F (37 8 °C)] 100 1 °F (37 8 °C)  HR: [] 88  Resp:  [9-22] 13  BP: (133-186)/(60-94) 176/94  Temp (24hrs), Av 6 °F (37 6 °C), Min:98 9 °F (37 2 °C), Max:100 1 °F (37 8 °C)        Physical Exam   Constitutional: He is oriented to person, place, and time  He appears well-developed and well-nourished  No distress  HENT:   Head: Normocephalic and atraumatic  Mouth/Throat: Oropharynx is clear and moist    Eyes: Pupils are equal, round, and reactive to light  EOM are normal    Neck: Neck supple  Cardiovascular: Normal rate and regular rhythm  Pulmonary/Chest: Effort normal and breath sounds normal  He has no wheezes  He has no rales  Abdominal: Soft  Bowel sounds are normal  He exhibits no distension  There is no tenderness  Musculoskeletal: He exhibits no edema or tenderness  Lymphadenopathy:     He has no cervical adenopathy  Neurological: He is alert and oriented to person, place, and time  No cranial nerve deficit  Skin: Skin is warm and dry  He is not diaphoretic  Psychiatric: He has a normal mood and affect  His behavior is normal    Vitals reviewed          Respiratory:  SpO2: SpO2: 98 %, SpO2 Activity: SpO2 Activity: At Rest, SpO2 Device: O2 Device: None (Room air)       Invasive/non-invasive ventilation settings   Respiratory    Lab Data (Last 4 hours)    None         O2/Vent Data (Last 4 hours)    None                Laboratory and Diagnostics:  Results from last 7 days   Lab Units 20  0437 20  0501 20  1150 20  0945 20  0850 20  0429 20  1804   WBC Thousand/uL 10 91* 12 32* 8 65 7 82  --  9 21 7 85   HEMOGLOBIN g/dL 9 5* 9 7* 9 2* 9 8*  --  11 1* 10 4*   I STAT HEMOGLOBIN g/dl  --   --   --   --  10 2*  --   --    HEMATOCRIT % 29 2* 30 1* 27 2* 29 0*  --  34 1* 32 6*   HEMATOCRIT, ISTAT %  --   --   --   --  30*  --   --    PLATELETS Thousands/uL 254 249 253 261  --  271 235   NEUTROS PCT % 75 75  --   --   --   --  60   MONOS PCT % 6 7  --   --   --   --  8     Results from last 7 days   Lab Units 02/23/20  0437 02/22/20  0501 02/21/20  1150 02/21/20  0945 02/21/20  0850 02/21/20  0429 02/20/20  1804   SODIUM mmol/L 141 139 137 142  --  143 143   POTASSIUM mmol/L 3 7 4 9 4 8 5 4*  --  4 6 5 6*   CHLORIDE mmol/L 103 100 100 100  --  101 101   CO2 mmol/L 24 25 24 24  --  27 29   CO2, I-STAT mmol/L  --   --   --   --  24  --   --    ANION GAP mmol/L 14* 14* 13 18*  --  15* 13   BUN mg/dL 52* 54* 50* 50*  --  52* 53*   CREATININE mg/dL 15 40* 16 20* 15 20* 15 82*  --  15 44* 15 32*   CALCIUM mg/dL 9 2 8 6 8 9 9 0  --  9 0 8 7   GLUCOSE RANDOM mg/dL 188* 223* 216* 255*  --  212* 160*   ALT U/L  --  16  --   --   --   --  22   AST U/L  --  23  --   --   --   --  20   ALK PHOS U/L  --  65  --   --   --   --  77   ALBUMIN g/dL  --  3 0*  --   --   --   --  3 3*   TOTAL BILIRUBIN mg/dL  --  0 47  --   --   --   --  0 43     Results from last 7 days   Lab Units 02/21/20  1150 02/21/20  0945 02/20/20  1804   MAGNESIUM mg/dL 2 8* 2 8* 2 5      Results from last 7 days   Lab Units 02/20/20  1804   INR  1 02   PTT seconds 33          Results from last 7 days   Lab Units 02/21/20  1150 02/21/20  0945   LACTIC ACID mmol/L 3 5* 5 1*     ABG:  Results from last 7 days   Lab Units 02/21/20  1149   PH ART  7 454*   PCO2 ART mm Hg 33 2*   PO2 ART mm Hg 170 0*   HCO3 ART mmol/L 22 8   BASE EXC ART mmol/L -0 8   ABG SOURCE  Radial, Right     VBG:  Results from last 7 days   Lab Units 02/21/20  1149   ABG SOURCE  Radial, Right           Micro        EKG: NSR  Imaging:   No orders to display         Intake and Output  I/O       02/22 0701 - 02/23 0700 02/23 0701 - 02/24 0700    P  O  480 100    I V  (mL/kg) 2508 5 (24 4) 2055 9 (20 2)    IV Piggyback 150 200    Total Intake(mL/kg) 3138 5 (30 5) 2355 9 (23 1)    Urine (mL/kg/hr) 325 (0 1) 0 (0)    Emesis/NG output 0 0    Total Output 325 0    Net +2813 5 +2355 9          Unmeasured Emesis Occurrence 3 x 1 x        UOP: 0 ml/hr     Height and Weights   Height: 5' 11" (180 3 cm)  IBW: 75 3 kg  Body mass index is 31 39 kg/m²  Weight (last 2 days)     Date/Time   Weight    02/24/20 0600   102 (225 09)    02/22/20 0559   103 (226 85)                Nutrition       Diet Orders   (From admission, onward)             Start     Ordered    02/22/20 0630  Diet Charanjit/CHO Controlled; Consistent Carbohydrate Diet Level 3 (6 carb servings/90 grams CHO/meal)  Diet effective now     Question Answer Comment   Diet Type Charanjit/CHO Controlled    Charanjit/CHO Controlled Consistent Carbohydrate Diet Level 3 (6 carb servings/90 grams CHO/meal)    RD to adjust diet per protocol?  Yes        02/22/20 0629    02/21/20 1056  Room Service  Once     Question:  Type of Service  Answer:  Room Service - Appropriate with Assistance    02/21/20 1055                Active Medications  Scheduled Meds:  Current Facility-Administered Medications:  acetaminophen 650 mg Oral Q8H PRN Jennifer Cedeño MD    aspirin 81 mg Oral Daily Stephen Broussard, CRNP    atorvastatin 40 mg Oral HS Stephen Broussard, CRNP    calcium acetate 667 mg Oral TID With Meals Stephen Broussard, CRNP    chlorhexidine 15 mL Swish & Spit Q12H [facility] Stephen Broussard, CRNP    cholecalciferol 2,000 Units Oral Daily Stephen Broussard, CRNP    cinacalcet 90 mg Oral Daily Stephen Broussard, CRNP    cloNIDine 0 2 mg Transdermal Weekly Favio Jud, DO    diphenhydrAMINE (BENADRYL) IVPB 50 mg Intravenous Daily PRN Jennifer Cedeño MD    erythromycin base 250 mg Oral TID AC Jennifer Cedeño MD    famotidine 20 mg Oral HS Stephen Broussard, CRNP    gabapentin 100 mg Oral HS Stephen Broussard, CRNP    gentamicin 1 application Topical Daily Stephen Broussard, CRNP    haloperidol lactate 2 mg Intramuscular Daily PRN Jennifer Cedeño MD    heparin (porcine) 5,000 Units Subcutaneous [facility] DEISI Casas    hydrALAZINE 20 mg Intravenous Q4H PRN Favio Renner, DO    hydrALAZINE 50 mg Oral TID Favio Jud, DO    insulin regular (HumuLIN R,NovoLIN R) infusion 0 3-21 Units/hr Intravenous Titrated Dellia [place], CRNP Last Rate: 1 5 Units/hr (02/24/20 0600)   labetalol 150 mg Oral Q12H [facility], DO    Labetalol HCl 20 mg Intravenous Q2H PRN [facility] Peggy, DO    lactobacillus acidophilus-bulgaricus 1 packet Oral HS Dellia [place], CRNP    niCARdipine 1-15 mg/hr Intravenous Titrated Amber Muller MD Last Rate: Stopped (02/23/20 2301)   ondansetron 8 mg Intravenous Q6H Catherne Runner, MD Last Rate: Stopped (02/24/20 0217)   dextrose 10 % and sodium chloride 0 9 % 50 mL/hr Intravenous Continuous Dayne A Paster, DO Last Rate: 50 mL/hr (02/23/20 1600)   torsemide 100 mg Oral Daily With Lunch [facility], DO    valproate sodium 750 mg Intravenous Q8H [facility],  Last Rate: 750 mg (02/24/20 0550)     Continuous Infusions:    insulin regular (HumuLIN R,NovoLIN R) infusion 0 3-21 Units/hr Last Rate: 1 5 Units/hr (02/24/20 0600)   niCARdipine 1-15 mg/hr Last Rate: Stopped (02/23/20 2301)   dextrose 10 % and sodium chloride 0 9 % 50 mL/hr Last Rate: 50 mL/hr (02/23/20 1600)     PRN Meds:     acetaminophen 650 mg Q8H PRN   diphenhydrAMINE (BENADRYL) IVPB 50 mg Daily PRN   haloperidol lactate 2 mg Daily PRN   hydrALAZINE 20 mg Q4H PRN   Labetalol HCl 20 mg Q2H PRN       Allergies   Allergies   Allergen Reactions    Sulfa Antibiotics Rash     ---------------------------------------------------------------------------------------  Advance Directive and Living Will:      Power of :    POLST:    ---------------------------------------------------------------------------------------  Care Time Delivered:   No Critical Care time spent     [facility], DO      Portions of the record may have been created with voice recognition software  Occasional wrong word or "sound a like" substitutions may have occurred due to the inherent limitations of voice recognition software    Read the chart carefully and recognize, using context, where substitutions have occurred

## 2020-02-25 PROBLEM — G40.901 STATUS EPILEPTICUS (HCC): Status: ACTIVE | Noted: 2020-02-21

## 2020-02-25 LAB
ANION GAP SERPL CALCULATED.3IONS-SCNC: 13 MMOL/L (ref 4–13)
BASOPHILS # BLD AUTO: 0.06 THOUSANDS/ΜL (ref 0–0.1)
BASOPHILS NFR BLD AUTO: 1 % (ref 0–1)
BUN SERPL-MCNC: 47 MG/DL (ref 5–25)
CALCIUM SERPL-MCNC: 8.8 MG/DL (ref 8.3–10.1)
CHLORIDE SERPL-SCNC: 99 MMOL/L (ref 100–108)
CO2 SERPL-SCNC: 22 MMOL/L (ref 21–32)
CREAT SERPL-MCNC: 14.5 MG/DL (ref 0.6–1.3)
EOSINOPHIL # BLD AUTO: 0.21 THOUSAND/ΜL (ref 0–0.61)
EOSINOPHIL NFR BLD AUTO: 2 % (ref 0–6)
ERYTHROCYTE [DISTWIDTH] IN BLOOD BY AUTOMATED COUNT: 14.6 % (ref 11.6–15.1)
GFR SERPL CREATININE-BSD FRML MDRD: 4 ML/MIN/1.73SQ M
GLUCOSE SERPL-MCNC: 111 MG/DL (ref 65–140)
GLUCOSE SERPL-MCNC: 267 MG/DL (ref 65–140)
GLUCOSE SERPL-MCNC: 325 MG/DL (ref 65–140)
GLUCOSE SERPL-MCNC: 337 MG/DL (ref 65–140)
GLUCOSE SERPL-MCNC: 424 MG/DL (ref 65–140)
GLUCOSE SERPL-MCNC: 470 MG/DL (ref 65–140)
GLUCOSE SERPL-MCNC: 80 MG/DL (ref 65–140)
HCT VFR BLD AUTO: 25.4 % (ref 36.5–49.3)
HGB BLD-MCNC: 8.6 G/DL (ref 12–17)
IMM GRANULOCYTES # BLD AUTO: 0.07 THOUSAND/UL (ref 0–0.2)
IMM GRANULOCYTES NFR BLD AUTO: 1 % (ref 0–2)
LYMPHOCYTES # BLD AUTO: 0.88 THOUSANDS/ΜL (ref 0.6–4.47)
LYMPHOCYTES NFR BLD AUTO: 10 % (ref 14–44)
MCH RBC QN AUTO: 32.7 PG (ref 26.8–34.3)
MCHC RBC AUTO-ENTMCNC: 33.9 G/DL (ref 31.4–37.4)
MCV RBC AUTO: 97 FL (ref 82–98)
MONOCYTES # BLD AUTO: 1 THOUSAND/ΜL (ref 0.17–1.22)
MONOCYTES NFR BLD AUTO: 12 % (ref 4–12)
NEUTROPHILS # BLD AUTO: 6.48 THOUSANDS/ΜL (ref 1.85–7.62)
NEUTS SEG NFR BLD AUTO: 74 % (ref 43–75)
NRBC BLD AUTO-RTO: 0 /100 WBCS
PLATELET # BLD AUTO: 249 THOUSANDS/UL (ref 149–390)
PMV BLD AUTO: 10.6 FL (ref 8.9–12.7)
POTASSIUM SERPL-SCNC: 4.1 MMOL/L (ref 3.5–5.3)
RBC # BLD AUTO: 2.63 MILLION/UL (ref 3.88–5.62)
SODIUM SERPL-SCNC: 134 MMOL/L (ref 136–145)
WBC # BLD AUTO: 8.7 THOUSAND/UL (ref 4.31–10.16)

## 2020-02-25 PROCEDURE — 97530 THERAPEUTIC ACTIVITIES: CPT

## 2020-02-25 PROCEDURE — 99222 1ST HOSP IP/OBS MODERATE 55: CPT | Performed by: INTERNAL MEDICINE

## 2020-02-25 PROCEDURE — 80048 BASIC METABOLIC PNL TOTAL CA: CPT | Performed by: FAMILY MEDICINE

## 2020-02-25 PROCEDURE — 82948 REAGENT STRIP/BLOOD GLUCOSE: CPT

## 2020-02-25 PROCEDURE — 99223 1ST HOSP IP/OBS HIGH 75: CPT | Performed by: INTERNAL MEDICINE

## 2020-02-25 PROCEDURE — 99233 SBSQ HOSP IP/OBS HIGH 50: CPT | Performed by: INTERNAL MEDICINE

## 2020-02-25 PROCEDURE — 99232 SBSQ HOSP IP/OBS MODERATE 35: CPT | Performed by: INTERNAL MEDICINE

## 2020-02-25 PROCEDURE — 85025 COMPLETE CBC W/AUTO DIFF WBC: CPT | Performed by: FAMILY MEDICINE

## 2020-02-25 PROCEDURE — 97116 GAIT TRAINING THERAPY: CPT

## 2020-02-25 RX ORDER — ONDANSETRON 2 MG/ML
4 INJECTION INTRAMUSCULAR; INTRAVENOUS EVERY 4 HOURS PRN
Status: DISCONTINUED | OUTPATIENT
Start: 2020-02-25 | End: 2020-02-28 | Stop reason: HOSPADM

## 2020-02-25 RX ORDER — VALPROIC ACID 250 MG/1
750 CAPSULE, LIQUID FILLED ORAL EVERY 12 HOURS SCHEDULED
Status: DISCONTINUED | OUTPATIENT
Start: 2020-02-25 | End: 2020-02-28 | Stop reason: HOSPADM

## 2020-02-25 RX ADMIN — CLONIDINE HYDROCHLORIDE 0.1 MG: 0.1 TABLET ORAL at 06:00

## 2020-02-25 RX ADMIN — ERYTHROMYCIN 250 MG: 250 TABLET, FILM COATED ORAL at 07:58

## 2020-02-25 RX ADMIN — TORSEMIDE 100 MG: 20 TABLET ORAL at 11:47

## 2020-02-25 RX ADMIN — ERYTHROMYCIN 250 MG: 250 TABLET, FILM COATED ORAL at 16:39

## 2020-02-25 RX ADMIN — ONDANSETRON 8 MG: 2 INJECTION INTRAMUSCULAR; INTRAVENOUS at 07:58

## 2020-02-25 RX ADMIN — ATORVASTATIN CALCIUM 40 MG: 40 TABLET, FILM COATED ORAL at 21:11

## 2020-02-25 RX ADMIN — GENTAMICIN SULFATE 1 APPLICATION: 1 OINTMENT TOPICAL at 23:00

## 2020-02-25 RX ADMIN — HEPARIN SODIUM 5000 UNITS: 5000 INJECTION INTRAVENOUS; SUBCUTANEOUS at 21:11

## 2020-02-25 RX ADMIN — ONDANSETRON 8 MG: 2 INJECTION INTRAMUSCULAR; INTRAVENOUS at 02:14

## 2020-02-25 RX ADMIN — CHLORHEXIDINE GLUCONATE 0.12% ORAL RINSE 15 ML: 1.2 LIQUID ORAL at 08:02

## 2020-02-25 RX ADMIN — FAMOTIDINE 20 MG: 20 TABLET ORAL at 21:11

## 2020-02-25 RX ADMIN — HEPARIN SODIUM 5000 UNITS: 5000 INJECTION INTRAVENOUS; SUBCUTANEOUS at 14:20

## 2020-02-25 RX ADMIN — LABETALOL 20 MG/4 ML (5 MG/ML) INTRAVENOUS SYRINGE 20 MG: at 03:01

## 2020-02-25 RX ADMIN — INSULIN LISPRO 6 UNITS: 100 INJECTION, SOLUTION INTRAVENOUS; SUBCUTANEOUS at 11:46

## 2020-02-25 RX ADMIN — HYDRALAZINE HYDROCHLORIDE 50 MG: 50 TABLET ORAL at 08:02

## 2020-02-25 RX ADMIN — NIFEDIPINE 60 MG: 60 TABLET, FILM COATED, EXTENDED RELEASE ORAL at 08:04

## 2020-02-25 RX ADMIN — GABAPENTIN 100 MG: 100 CAPSULE ORAL at 21:10

## 2020-02-25 RX ADMIN — INSULIN GLARGINE 10 UNITS: 100 INJECTION, SOLUTION SUBCUTANEOUS at 08:11

## 2020-02-25 RX ADMIN — HEPARIN SODIUM 5000 UNITS: 5000 INJECTION INTRAVENOUS; SUBCUTANEOUS at 05:57

## 2020-02-25 RX ADMIN — VALPROATE SODIUM 750 MG: 100 INJECTION, SOLUTION INTRAVENOUS at 08:11

## 2020-02-25 RX ADMIN — ERYTHROMYCIN 250 MG: 250 TABLET, FILM COATED ORAL at 11:48

## 2020-02-25 RX ADMIN — LACTOBACILLUS ACIDOPHILUS / LACTOBACILLUS BULGARICUS 1 PACKET: 100 MILLION CFU STRENGTH GRANULES at 22:41

## 2020-02-25 RX ADMIN — CALCIUM ACETATE 667 MG: 667 CAPSULE ORAL at 16:39

## 2020-02-25 RX ADMIN — HYDRALAZINE HYDROCHLORIDE 50 MG: 50 TABLET ORAL at 16:39

## 2020-02-25 RX ADMIN — INSULIN LISPRO 8 UNITS: 100 INJECTION, SOLUTION INTRAVENOUS; SUBCUTANEOUS at 07:56

## 2020-02-25 RX ADMIN — INSULIN HUMAN 10 UNITS: 100 INJECTION, SOLUTION PARENTERAL at 05:00

## 2020-02-25 RX ADMIN — ASPIRIN 81 MG 81 MG: 81 TABLET ORAL at 08:01

## 2020-02-25 RX ADMIN — INSULIN LISPRO 6 UNITS: 100 INJECTION, SOLUTION INTRAVENOUS; SUBCUTANEOUS at 03:01

## 2020-02-25 RX ADMIN — CLONIDINE HYDROCHLORIDE 0.1 MG: 0.1 TABLET ORAL at 22:41

## 2020-02-25 RX ADMIN — LABETALOL HCL 300 MG: 200 TABLET, FILM COATED ORAL at 08:01

## 2020-02-25 RX ADMIN — CINACALCET HYDROCHLORIDE 90 MG: 30 TABLET, FILM COATED ORAL at 08:04

## 2020-02-25 RX ADMIN — VALPROIC ACID 750 MG: 250 CAPSULE, LIQUID FILLED ORAL at 21:10

## 2020-02-25 RX ADMIN — MELATONIN 2000 UNITS: at 08:02

## 2020-02-25 RX ADMIN — HYDRALAZINE HYDROCHLORIDE 50 MG: 50 TABLET ORAL at 21:13

## 2020-02-25 RX ADMIN — CHLORHEXIDINE GLUCONATE 0.12% ORAL RINSE 15 ML: 1.2 LIQUID ORAL at 21:10

## 2020-02-25 RX ADMIN — CALCIUM ACETATE 667 MG: 667 CAPSULE ORAL at 07:58

## 2020-02-25 RX ADMIN — LABETALOL HCL 300 MG: 200 TABLET, FILM COATED ORAL at 21:13

## 2020-02-25 RX ADMIN — INSULIN GLARGINE 14 UNITS: 100 INJECTION, SOLUTION SUBCUTANEOUS at 21:15

## 2020-02-25 RX ADMIN — LISINOPRIL 40 MG: 20 TABLET ORAL at 08:01

## 2020-02-25 RX ADMIN — CALCIUM ACETATE 667 MG: 667 CAPSULE ORAL at 11:48

## 2020-02-25 RX ADMIN — CLONIDINE HYDROCHLORIDE 0.1 MG: 0.1 TABLET ORAL at 14:20

## 2020-02-25 NOTE — RESTORATIVE TECHNICIAN NOTE
Restorative Specialist Mobility Note       Activity: Ambulate in room, Bathroom privileges, Dangle, Stand at bedside(Educated/encouraged pt to ambulate with assistance 3-4 x's/day  Assisted pt to the bathroom   PCA Reanna present to further assist pt with ADL's while pt in bathroom )     Assistive Device: Front wheel walker, Other (Comment)(Assist x2 with chair follow )          Henrique YIN, Restorative Technician, [employer]

## 2020-02-25 NOTE — PROGRESS NOTES
NEPHROLOGY PROGRESS NOTE   Vijay Mckeon 37 y o  male MRN: [id number]  [facility] Encounter: [id number]      ASSESSMENT/PLAN:  1  ESRD:  On CCPD and follows at [facility]  · Current orders: 10 hours, 12 5L total volume, 2 2L exchanges with 1 5L last fill   · Currently all 2 5% except for extraneal last fill   · At home uses all 1 5%   · EDW 94kg but current weight 99 3kg so will continue 2 5% dineal since weight and BP are elevated   · Trend daily weights   2  Status epilepticus: neuro on board and felt to be provoked seizure from uncontrolled HTN  3  Anemia of CKD:  HETAL on hold due to possible seizure  · hgb trending down to 8 6 and will continue to monitor  4  Mineral bone disease of CKD:  Continue calcium acetate with meals, Sensipar and vitamin-D replacement  5  Hypertension:  Blood pressure on the higher side  Yesterday his labetalol was increased to 200 mg b i d     Will trend blood pressure today with this recent change  6  DM I: discussed with primary team and patient/family concerned about hyperglycemia today however yesterday he was hypoglycemic and required D50  · Currently using all 2 5% dineal (except last fill) but per patient he was mostly using 1 5% at home which could be contributing to hyperglycemia         SUBJECTIVE:  Feeling ok today  No issues with PD overnight  Concerned about his glucose       OBJECTIVE:  Current Weight: Weight - Scale: 99 3 kg (218 lb 14 7 oz)  Vitals:    02/25/20 0715   BP: 169/91   Pulse: 79   Resp: 17   Temp: 97 9 °F (36 6 °C)   SpO2: 97%       Intake/Output Summary (Last 24 hours) at 2/25/2020 6572  Last data filed at 2/24/2020 1800  Gross per 24 hour   Intake 670 88 ml   Output 150 ml   Net 520 88 ml       General:  No acute distress  Skin:  No rash  Eyes:  Sclerae anicteric  ENT:  Moist mucous membranes  Neck:  Trachea midline with no JVD  Chest:  Clear to auscultation bilaterally  CVS:  Regular rate and rhythm  Abdomen:  Soft, nontender, nondistended  Extremities:  trace edema  Neuro:  Awake and alert  Psych:  Appropriate affect      Medications:  Scheduled Meds:  Current Facility-Administered Medications:  acetaminophen 650 mg Oral Q8H PRN [place] Xiomara, DO    aspirin 81 mg Oral Daily Anabel Marion, DO    atorvastatin 40 mg Oral HS Anabel Marion, DO    calcium acetate 667 mg Oral TID With Meals [place], DO    chlorhexidine 15 mL Swish & Spit Q12H [address] Anabel ROSS, DO    cholecalciferol 2,000 Units Oral Daily Anabel Marion, DO    cinacalcet 90 mg Oral Daily Anabel Marion, DO    cloNIDine 0 1 mg Oral Q8H [address] [place], DO    dextrose 13 mL Intravenous Once [place] Xiomara, DO    diphenhydrAMINE (BENADRYL) IVPB 50 mg Intravenous Daily PRN Anabel Marion, DO    erythromycin base 250 mg Oral TID AC Anabel Marion, DO    famotidine 20 mg Oral HS Anabel Marion, DO    gabapentin 100 mg Oral HS Anabel Marion, DO    gentamicin 1 application Topical Daily Anabel Marion, DO    haloperidol lactate 2 mg Intramuscular Daily PRN Anabel Marion, DO    heparin (porcine) 5,000 Units Subcutaneous [facility] Anabel Marion, DO    hydrALAZINE 20 mg Intravenous Q4H PRN Anabel Marion, DO    hydrALAZINE 50 mg Oral TID Anabel Marion, DO    insulin glargine 10 Units Subcutaneous QAM Anabel Marion, DO    insulin glargine 14 Units Subcutaneous HS Anabel Marion, DO    insulin lispro 2-12 Units Subcutaneous 4x Daily (AC & HS) Prabhjot Ramirez PA-C    labetalol 300 mg Oral Q12H [address] Anabel Marion, DO    Labetalol HCl 20 mg Intravenous Q2H PRN Anabel Marion, DO    lactobacillus acidophilus-bulgaricus 1 packet Oral HS Anabel Marion, DO    lisinopril 40 mg Oral Daily Anabel Marion, DO    NIFEdipine 60 mg Oral Daily Anabel Marion, DO    ondansetron 8 mg Intravenous Q6H Anabel Marion, DO Last Rate: 8 mg (02/25/20 0758)   torsemide 100 mg Oral Daily With [employer], DO    valproate sodium 750 mg Intravenous BID Anabel Schultz,  Last Rate: 750 mg (02/25/20 6972)       PRN Meds:   acetaminophen    diphenhydrAMINE (BENADRYL) IVPB    haloperidol lactate    hydrALAZINE    Labetalol HCl    Continuous Infusions:     Laboratory Results:  Results from last 7 days   Lab Units 02/25/20  0759 02/24/20  0601 02/23/20  0437 02/22/20  0501 02/21/20  1150 02/21/20  0945 02/21/20  0850 02/21/20  0429 02/20/20  1804   WBC Thousand/uL 8 70 9 90 10 91* 12 32* 8 65 7 82  --  9 21 7 85   HEMOGLOBIN g/dL 8 6* 9 1* 9 5* 9 7* 9 2* 9 8*  --  11 1* 10 4*   I STAT HEMOGLOBIN g/dl  --   --   --   --   --   --  10 2*  --   --    HEMATOCRIT % 25 4* 28 1* 29 2* 30 1* 27 2* 29 0*  --  34 1* 32 6*   HEMATOCRIT, ISTAT %  --   --   --   --   --   --  30*  --   --    PLATELETS Thousands/uL 249 242 254 249 253 261  --  271 235   SODIUM mmol/L 134* 142 141 139 137 142  --  143 143   POTASSIUM mmol/L 4 1 3 7 3 7 4 9 4 8 5 4*  --  4 6 5 6*   CHLORIDE mmol/L 99* 105 103 100 100 100  --  101 101   CO2 mmol/L 22 23 24 25 24 24  --  27 29   CO2, I-STAT mmol/L  --   --   --   --   --   --  24  --   --    BUN mg/dL 47* 47* 52* 54* 50* 50*  --  52* 53*   CREATININE mg/dL 14 50* 15 40* 15 40* 16 20* 15 20* 15 82*  --  15 44* 15 32*   CALCIUM mg/dL 8 8 9 7 9 2 8 6 8 9 9 0  --  9 0 8 7   MAGNESIUM mg/dL  --   --   --   --  2 8* 2 8*  --   --  2 5   PHOSPHORUS mg/dL  --  8 8*  --   --   --   --   --   --   --    GLUCOSE, ISTAT mg/dl  --   --   --   --   --   --  275*  --   --

## 2020-02-25 NOTE — CONSULTS
Consultation - GI   Kimi Mckeon 37 y o  male MRN: [id number]  Unit/Bed#: [facility] 708-01 Encounter: [id number]      Assessment/Plan   Assessment:  Mr Heather Benitez is a 38 y/o male with PMHx of DMI, gastroparesis, ESRD on PD and on pancreas/kidney transplant list, CVAx2, and HTN  GI service consulted due to recurrent nausea and vomiting likely secondary to gastroparesis  Plan:  - Patient does report improvement in symptoms  Patient continues to report nausea with resolved vomiting    - Upright abdominal XR completed and was unremarkable with no signs concerning of obstruction    - Continue erythromycin 250 mg and Zofran PRN for N/V  Continue on Famotidine 20 mg   - Patient currently tolerating PO intake  Continue on diabetic diet  - Will require outpatient GI follow up     Physician Requesting Consult: Mike Moyer MD  Reason for Consult / Principal Problem: Gastroparesis   HPI:   Mr Heather Benitez is a 38 y/o male with PMHx of DMI, gastroparesis, ESRD on PD and on pancreas/kidney transplant list, CVAx2, and HTN  GI service consulted due to recurrent nausea and vomiting likely secondary to gastroparesis  He is a patient of Dr Kaylie Brown, last seen in September 2019, and was diagnosed with gastroparesis in June 2019  Last Upper EGD completed in April 2019 which demonstrated moderate gastritis, duodenitis with esophagitis, and retained food in stomach which ultimately prompted patient's emptying studying  Emptying study completed in June 2019 which demonstrated 64% retention after 4 hours  Patient was originally started on Ranitidine 150 mg and was switched to Famotidine 20 mg in September 2019  Prior to this hospitalization, patient states that his gastroparesis has been well controlled on Famotidine and dietary changes and not requiring hospitalizations for the past couple of months   He has only required Zofran once/week; however, he states that he was requiring Zofran every night the week prior to his admission due to recurrent nausea  His symptoms worsened on admit and has been associated with continued nausea and accompanying NBNB vomiting  He last episode of vomiting was on Sunday prior to being started on erythromycin  The patient states that his vomiting was prompted by PO intake including with meal and pill ingestion  He continuous to remain nauseous but has been tolerating PO intake and ate a half orange and cereal for breakfast today  Last BM was yesterday and patient states that he has 3 watery BM episodes  Patient denies any associated abdominal pain, bloating, H/A, but does admit to weakness and fatigue  Inpatient consult to gastroenterology  Consult performed by: Joslyn Hart  Consult ordered by: Nara Mayo DO        Review of Systems: Negative unless otherwise listed in HPI     Historical Information   Past Medical History:   Diagnosis Date    [facility] [address] )     Anxiety     Cerebellar stroke side undetermined 2015 2015,1/2018    Diabetes type 1, controlled ([facility] [address] )     IDDM    Diarrhea     Gastroparesis     History of shingles 2010    History of transfusion 02/2018    Hypertension     Muscle weakness     general unsteadiness    Retinopathy      Past Surgical History:   Procedure Laterality Date    CARDIAC LOOP RECORDER  05/2018    EGD      EYE SURGERY      PERITONEAL CATHETER INSERTION N/A 8/27/2018    Procedure: UNROOF PD CATHETER;  Surgeon: Homero Bee DO;  [facility];  Service: General    [place] ESOPHAGOGASTRODUODENOSCOPY TRANSORAL DIAGNOSTIC N/A 4/18/2019    Procedure: ESOPHAGOGASTRODUODENOSCOPY (EGD); Surgeon: Coreen Cain MD;  [facility];   Service: Gastroenterology    [place] LAP INSERTION TUNNELED INTRAPERITONEAL CATHETER N/A 8/6/2018    Procedure: LAPAROSCOPIC PD CATHETER PLACEMENT;  Surgeon: Homero Bee DO;  [facility];  Service: General    TONSILLECTOMY       Social History   Social History     Substance and Sexual Activity   Alcohol Use Not Currently    Comment: rarely     Social History     Substance and Sexual Activity   Drug Use Yes    Frequency: 5 0 times per week    Types: Marijuana    Comment: medical     E-Cigarette/Vaping    E-Cigarette Use Never User      E-Cigarette/Vaping Substances    Nicotine No     THC No     CBD No     Flavoring No     Other No     Unknown No      Social History     Tobacco Use   Smoking Status Former Smoker    Packs/day: 0 50    Years: 12 00    Pack years: 6 00    Types: Cigarettes    Last attempt to quit: 2018    Years since quittin 0   Smokeless Tobacco Never Used   Tobacco Comment    quit 2018     Allergies   Allergen Reactions    Sulfa Antibiotics Rash       Objective     Intake/Output Summary (Last 24 hours) at 2020 5429  Last data filed at 2020 1800  Gross per 24 hour   Intake 670 88 ml   Output 150 ml   Net 520 88 ml       Invasive Devices:   Peripheral IV 20 Right Wrist (Active)   Site Assessment Clean;Dry; Intact 2020  8:00 AM   Dressing Type Transparent 2020  8:00 AM   Line Status Flushed; Infusing 2020  8:00 AM   Dressing Status Clean;Dry; Intact 2020  8:00 AM       Physical Exam   Constitutional: He is oriented to person, place, and time  He appears well-developed  Appears to be in visible discomfort   HENT:   Head: Normocephalic and atraumatic  Eyes: Conjunctivae are normal  No scleral icterus  Cardiovascular: Normal rate, regular rhythm and normal heart sounds  No murmur heard  Pulmonary/Chest: Effort normal and breath sounds normal  He has no wheezes  Abdominal: Soft  Bowel sounds are normal  He exhibits no distension  Soft, nondistended abdomen with normoactive bowel sounds and no tenderness to palpation   Neurological: He is alert and oriented to person, place, and time  Skin: Skin is warm  Capillary refill takes less than 2 seconds  He is not diaphoretic         Lab Results: I have personally reviewed pertinent reports  Imaging Studies: I have personally reviewed pertinent reports  VTE Prophylaxis: Heparin    Counseling / Coordination of Care  Total floor / unit time spent today 30 minutes  Greater than 50% of total time was spent with the patient and / or family counseling and / or coordination of care

## 2020-02-25 NOTE — PLAN OF CARE
Problem: PHYSICAL THERAPY ADULT  Goal: Performs mobility at highest level of function for planned discharge setting  See evaluation for individualized goals  Description  Treatment/Interventions: LE strengthening/ROM, Functional transfer training, Therapeutic exercise, Patient/family training, Endurance training, Gait training, Bed mobility, OT, Spoke to case management, Spoke to nursing, Equipment eval/education          See flowsheet documentation for full assessment, interventions and recommendations  Outcome: Progressing  Note:   Prognosis: Good  Problem List: Decreased strength, Decreased endurance, Impaired balance, Decreased mobility, Decreased coordination, Decreased safety awareness, Impaired judgement, Pain, Obesity  Assessment: Patient seen for physical therapy session with a focus on gait training, functional transfers, there-ex, and activity tolerance  Primary limitation is fatigue and generalized weakness  Pt made significant improvement from previous PT session and increased ambulation distances  Pt ambulated 20 feet x 2 with chair follow, RW, and mod-min A x  1  Continues to require assistance with RW management and required seated rest break  Progress from A x 2 to A x 1 with sit<> stand transfers  Recommend STR upon discharge once medically stable  Barriers to Discharge: Decreased caregiver support, Inaccessible home environment     Recommendation: Post acute IP rehab     PT - OK to Discharge: Yes(to rehab once medically stable)    See flowsheet documentation for full assessment

## 2020-02-25 NOTE — PHYSICAL THERAPY NOTE
Physical Therapy Treatment Note       02/25/20 1030   Pain Assessment   Pain Assessment 0-10   Pain Score No Pain   Restrictions/Precautions   Weight Bearing Precautions Per Order No   Other Precautions Cognitive; Chair Alarm; Bed Alarm;Multiple lines; Fall Risk   General   Chart Reviewed Yes   Family/Caregiver Present No   Cognition   Overall Cognitive Status Impaired   Arousal/Participation Responsive   Attention Attends with cues to redirect   Orientation Level Oriented X4   Memory Unable to assess   Following Commands Follows one step commands without difficulty   Subjective   Subjective states he feels OK   Transfers   Sit to Stand 3  Moderate assistance   Additional items Assist x 2   Stand to Sit 4  Minimal assistance   Additional items Assist x 2   Toilet transfer 3  Moderate assistance   Additional items Assist x 1   Ambulation/Elevation   Gait pattern   (slow, ataxia, posterior and lateral LOB)   Gait Assistance 3  Moderate assist   Additional items Assist x 1   Assistive Device Rolling walker   Distance 10'x2 to and from BR   Balance   Static Sitting Fair -   Dynamic Sitting Poor +   Static Standing Poor +   Dynamic Standing Poor   Ambulatory Poor   Endurance Deficit   Endurance Deficit Yes   Activity Tolerance   Activity Tolerance Patient limited by fatigue;Treatment limited secondary to medical complications (Comment)   Nurse Made Aware yes   Assessment   Prognosis Good   Problem List Decreased strength;Decreased endurance; Impaired balance;Decreased mobility; Decreased coordination;Decreased cognition; Impaired judgement;Decreased safety awareness   Assessment Pt seen for session for transfers, gait and repositioning  Pt cooperative, nursing requesting assist for gait to BR   cooperative but continued weakness, unsteadiness    remains appropriate for rehab at d/c   Goals   Patient Goals to use the BR   STG Expiration Date 03/07/20   PT Treatment Day 1   Plan   Treatment/Interventions Functional transfer training;LE strengthening/ROM; Therapeutic exercise; Endurance training;Elevations; Patient/family training;Equipment eval/education; Bed mobility;Gait training   Progress Progressing toward goals   PT Frequency   (3-5x/wk)   Recommendation   Recommendation Post acute IP rehab   Equipment Recommended Walker   PT - OK to Discharge Yes  (when stable to rehab)   Luke Vargas PT, DPT CSRS

## 2020-02-25 NOTE — PROGRESS NOTES
Patient was admitted to the hospital to the [address] [place] [address] as he had a prior history of stroke and presented to the ED with possible stroke symptoms  He presented to the ED with complaints of worsening  acute on chronic vertigo with negative initial CT head which cannot rule out a posterior circulation stroke, therefore patient was admitted under the stroke pathway initially to undergo further testing ie  MRI testing with neurology consultation to further r/o either an acute CVA vs another CNS process ie demyelinating process as noted as a differential on prior MRI on 5/19

## 2020-02-25 NOTE — PROGRESS NOTES
Progress Note Arlin Rueda 1985, 37 y o  male MRN: [id number]    Unit/Bed#: [facility] 708-01 Encounter: [id number]    Primary Care Provider: David Massey DO   Date and time admitted to hospital: 2/21/2020 10:44 AM        Diabetic gastroparesis [facility])  Assessment & Plan  GI consulted, appreciate input  Patient started on erythromycin and famotidine 1 day prior, tolerating well  Tolerating diet  Zofran p r n    Outpatient GI follow-up    Dyslipidemia  Assessment & Plan  Continue Lipitor    Renovascular hypertension  Assessment & Plan  Uncontrolled  Patient initially on Cardene drip, now discontinued  Continue clonidine, hydralazine, Procardia    End-stage renal disease on peritoneal dialysis [facility])  Assessment & Plan  Continue peritoneal dialysis  Management as per Nephrology    History of lacunar cerebrovascular accident (CVA)  Assessment & Plan  Continue aspirin and Lipitor    Type 1 diabetes mellitus with hypoglycemia [facility])  Assessment & Plan  Lab Results   Component Value Date    HGBA1C 5 6 02/21/2020       Recent Labs     02/25/20  0415 02/25/20  0559 02/25/20  1108 02/25/20  1636   POCGLU 470* 337* 267* 111       Blood Sugar Average: Last 72 hrs:  (P) 030 [id number]     With episodes of hypo and hyperglycemia this hospitalization  Patient was initially on insulin drip now off and with a higher dextrose dialysate- nephrology to change dialysate  Endocrinology consulted, appreciate input  Continue Lantus 10 units in the morning and 14 units at night, Humalog 6 units with meals, SSI, Accu-Cheks    * Status epilepticus ([address] )  Assessment & Plan  · Intubated then extubated 2/23  · Neurology consulted, appreciate input  · S/p EEG monitoring- no epileptiform activity seen as per Neurology  · As per Neurology thought to be due to uncontrolled hypertension  · Continue Depacon  · Outpatient Neurology follow-up      VTE Pharmacologic Prophylaxis:   Pharmacologic: Heparin  Mechanical VTE Prophylaxis in Place: Yes    Patient Centered Rounds: I have performed bedside rounds with nursing staff today  Discussions with Specialists or Other Care Team Provider:  Endocrinology and nephrology    Education and Discussions with Family / Patient:  Discussed treatment plan with patient, patient's parents    Time Spent for Care: 45 minutes  More than 50% of total time spent on counseling and coordination of care as described above  Current Length of Stay: 4 day(s)    Current Patient Status: Inpatient   Certification Statement: The patient will continue to require additional inpatient hospital stay due to Further workup    Discharge Plan:  Likely rehab    Code Status: Level 1 - Full Code      Subjective:   No acute overnight events  This morning patient was very lethargic  He had very high blood glucose overnight  He is able tolerate diet  Objective:     Vitals:   Temp (24hrs), Av 4 °F (36 9 °C), Min:97 9 °F (36 6 °C), Max:99 5 °F (37 5 °C)    Temp:  [97 9 °F (36 6 °C)-99 5 °F (37 5 °C)] 97 9 °F (36 6 °C)  HR:  [73-89] 73  Resp:  [17-18] 17  BP: (146-198)/(73-98) 150/83  SpO2:  [92 %-100 %] 98 %  Body mass index is 30 72 kg/m²  Input and Output Summary (last 24 hours): Intake/Output Summary (Last 24 hours) at 2020 1718  Last data filed at 2020 1111  Gross per 24 hour   Intake 330 ml   Output    Net 330 ml       Physical Exam:     Physical Exam   Constitutional: He is oriented to person, place, and time  He appears well-developed and well-nourished  HENT:   Head: Normocephalic and atraumatic  Mouth/Throat: Oropharynx is clear and moist    Eyes: Pupils are equal, round, and reactive to light  Conjunctivae are normal    Neck: Neck supple  No JVD present  Cardiovascular: Normal rate, regular rhythm, normal heart sounds and intact distal pulses  Pulmonary/Chest: Effort normal and breath sounds normal    Abdominal: Soft   Bowel sounds are normal    Musculoskeletal: He exhibits no edema or tenderness  Neurological: He is alert and oriented to person, place, and time  Skin: Skin is warm and dry  Capillary refill takes less than 2 seconds  Psychiatric: He has a normal mood and affect  His behavior is normal  Judgment and thought content normal    Nursing note and vitals reviewed  Additional Data:     Labs:    Results from last 7 days   Lab Units 02/25/20  0759   WBC Thousand/uL 8 70   HEMOGLOBIN g/dL 8 6*   HEMATOCRIT % 25 4*   PLATELETS Thousands/uL 249   NEUTROS PCT % 74   LYMPHS PCT % 10*   MONOS PCT % 12   EOS PCT % 2     Results from last 7 days   Lab Units 02/25/20  0759  02/22/20  0501   SODIUM mmol/L 134*   < > 139   POTASSIUM mmol/L 4 1   < > 4 9   CHLORIDE mmol/L 99*   < > 100   CO2 mmol/L 22   < > 25   BUN mg/dL 47*   < > 54*   CREATININE mg/dL 14 50*   < > 16 20*   ANION GAP mmol/L 13   < > 14*   CALCIUM mg/dL 8 8   < > 8 6   ALBUMIN g/dL  --   --  3 0*   TOTAL BILIRUBIN mg/dL  --   --  0 47   ALK PHOS U/L  --   --  65   ALT U/L  --   --  16   AST U/L  --   --  23   GLUCOSE RANDOM mg/dL 325*   < > 223*    < > = values in this interval not displayed  Results from last 7 days   Lab Units 02/20/20  1804   INR  1 02     Results from last 7 days   Lab Units 02/25/20  1636 02/25/20  1108 02/25/20  0559 02/25/20  0415 02/25/20  0231 02/24/20  2116 02/24/20  1924 02/24/20  1732 02/24/20  1701 02/24/20  1538 02/24/20  1341 02/24/20  1135   POC GLUCOSE mg/dl 111 267* 337* 470* 424* 280* 261* 198* 150* 52* 116 173*     Results from last 7 days   Lab Units 02/21/20  0429   HEMOGLOBIN A1C % 5 6     Results from last 7 days   Lab Units 02/21/20  1150 02/21/20  0945   LACTIC ACID mmol/L 3 5* 5 1*           * I Have Reviewed All Lab Data Listed Above  * Additional Pertinent Lab Tests Reviewed:  All Labs Within Last 24 Hours Reviewed    Imaging:    Imaging Reports Reviewed Today Include:  None  Imaging Personally Reviewed by Myself Includes:  None    Recent Cultures (last 7 days): Last 24 Hours Medication List:     Current Facility-Administered Medications:  acetaminophen 650 mg Oral Q8H PRN Daniacrystal Lizandro, DO   aspirin 81 mg Oral Daily Anabel Marion, DO   atorvastatin 40 mg Oral HS Anabel Marion, DO   calcium acetate 667 mg Oral TID With Meals Shawn Bone, DO   chlorhexidine 15 mL Swish & Spit Q12H [address] Anabel ROSS, DO   cholecalciferol 2,000 Units Oral Daily Anabel Marion, DO   cinacalcet 90 mg Oral Daily Anabel Marion, DO   cloNIDine 0 1 mg Oral Q8H [address] Anabel Marion, DO   dextrose 13 mL Intravenous Once Daniacrystal Lizandro, DO   diphenhydrAMINE (BENADRYL) IVPB 50 mg Intravenous Daily PRN Anabel Marion, DO   erythromycin base 250 mg Oral TID AC Anabel Marion, DO   famotidine 20 mg Oral HS Anabel Marion, DO   gabapentin 100 mg Oral HS Anabel Marion, DO   gentamicin 1 application Topical Daily Anabel Marion, DO   haloperidol lactate 2 mg Intramuscular Daily PRN Anabel Marion, DO   heparin (porcine) 5,000 Units Subcutaneous Q8H 27[address] Bhaskar Dunaway, DO   hydrALAZINE 20 mg Intravenous Q4H PRN Anabel Marion, DO   hydrALAZINE 50 mg Oral TID Anabel Marion, DO   insulin glargine 10 Units Subcutaneous QAM Anabel Marion, DO   insulin glargine 14 Units Subcutaneous HS Anabel Marion, DO   insulin lispro 2-12 Units Subcutaneous 4x Daily (AC & HS) Mary Oneil PA-C   insulin lispro 6 Units Subcutaneous TID With Meals Mari Mason MD   labetalol 300 mg Oral Q12H [address] Anabel Marion, DO   Labetalol HCl 20 mg Intravenous Q2H PRN Anabel Marion, DO   lactobacillus acidophilus-bulgaricus 1 packet Oral HS Anabel Marion, DO   lisinopril 40 mg Oral Daily Anabel Marion, DO   NIFEdipine 60 mg Oral Daily Anabel Marion, DO   ondansetron 4 mg Intravenous Q4H PRN Beatriz Torres MD   torsemide 100 mg Oral Daily With Urban Cargo, DO   valproic acid 750 mg Oral Q12H Pop Goldman MD        Today, Patient Was Seen By: Beatriz Torres MD    ** Please Note: Dictation voice to text software may have been used in the creation of this document   **

## 2020-02-25 NOTE — RESTORATIVE TECHNICIAN NOTE
Restorative Specialist Mobility Note       Activity: Ambulate in room, Bathroom privileges, Dangle, Stand at bedside(Educated/encouraged pt to ambulate with assistance 3-4 x's/day  Bed alarm on   Pt callbell, phone/tray within reach )     Assistive Device: Other (Comment)(Assist x2 back to the bed from the bathroom )             Kee YIN, Restorative Technician, [employer]

## 2020-02-25 NOTE — PLAN OF CARE
Problem: Prexisting or High Potential for Compromised Skin Integrity  Goal: Skin integrity is maintained or improved  Description  INTERVENTIONS:  - Identify patients at risk for skin breakdown  - Assess and monitor skin integrity  - Assess and monitor nutrition and hydration status  - Monitor labs   - Assess for incontinence   - Turn and reposition patient  - Assist with mobility/ambulation  - Relieve pressure over bony prominences  - Avoid friction and shearing  - Provide appropriate hygiene as needed including keeping skin clean and dry  - Evaluate need for skin moisturizer/barrier cream  - Collaborate with interdisciplinary team   - Patient/family teaching  - Consider wound care consult   Outcome: Progressing     Problem: Potential for Falls  Goal: Patient will remain free of falls  Description  INTERVENTIONS:  - Assess patient frequently for physical needs  -  Identify cognitive and physical deficits and behaviors that affect risk of falls    -  [place] fall precautions as indicated by assessment   - Educate patient/family on patient safety including physical limitations  - Instruct patient to call for assistance with activity based on assessment  - Modify environment to reduce risk of injury  - Consider OT/PT consult to assist with strengthening/mobility  Outcome: Progressing     Problem: CONFUSION/THOUGHT DISTURBANCE  Goal: Thought disturbances (confusion, delirium, depression, dementia or psychosis) are managed to maintain or return to baseline mental status and functional level  Description  INTERVENTIONS:  - Assess for possible contributors to  thought disturbance, including but not limited to medications, infection, impaired vision or hearing, underlying metabolic abnormalities, dehydration, respiratory compromise,  psychiatric diagnoses and notify attending PHYSICAN/AP  - Monitor and intervene to maintain adequate nutrition, hydration, elimination, sleep and activity  - Decrease environmental stimuli, including noise as appropriate  - Provide frequent contacts to provide refocusing, direction and reassurance as needed  Approach patient calmly with eye contact and at their level  - [place] high risk fall precautions, aspiration precautions and other safety measures, as indicated  - If delirium suspected, notify physician/AP of change in condition and request immediate in-person evaluation  - Pursue consults as appropriate including Geriatric (campus dependent), OT for cognitive evaluation/activity planning, psychiatric, pastoral care, etc   Outcome: Progressing     Problem: Nutrition/Hydration-ADULT  Goal: Nutrient/Hydration intake appropriate for improving, restoring or maintaining nutritional needs  Description  Monitor and assess patient's nutrition/hydration status for malnutrition  Collaborate with interdisciplinary team and initiate plan and interventions as ordered  Monitor patient's weight and dietary intake as ordered or per policy  Utilize nutrition screening tool and intervene as necessary  Determine patient's food preferences and provide high-protein, high-caloric foods as appropriate       INTERVENTIONS:  - Monitor oral intake, urinary output, labs, and treatment plans  - Assess nutrition and hydration status and recommend course of action  - Evaluate amount of meals eaten  - Assist patient with eating if necessary   - Allow adequate time for meals  - Recommend/ encourage appropriate diets, oral nutritional supplements, and vitamin/mineral supplements  - Order, calculate, and assess calorie counts as needed  - Recommend, monitor, and adjust tube feedings and TPN/PPN based on assessed needs  - Assess need for intravenous fluids  - Provide specific nutrition/hydration education as appropriate  - Include patient/family/caregiver in decisions related to nutrition  Outcome: Progressing     Problem: PAIN - ADULT  Goal: Verbalizes/displays adequate comfort level or baseline comfort level  Description  Interventions:  - Encourage patient to monitor pain and request assistance  - Assess pain using appropriate pain scale  - Administer analgesics based on type and severity of pain and evaluate response  - Implement non-pharmacological measures as appropriate and evaluate response  - Consider cultural and social influences on pain and pain management  - Notify physician/advanced practitioner if interventions unsuccessful or patient reports new pain  Outcome: Progressing     Problem: INFECTION - ADULT  Goal: Absence or prevention of progression during hospitalization  Description  INTERVENTIONS:  - Assess and monitor for signs and symptoms of infection  - Monitor lab/diagnostic results  - Monitor all insertion sites, i e  indwelling lines, tubes, and drains  - Monitor endotracheal if appropriate and nasal secretions for changes in amount and color  - [place] appropriate cooling/warming therapies per order  - Administer medications as ordered  - Instruct and encourage patient and family to use good hand hygiene technique  - Identify and instruct in appropriate isolation precautions for identified infection/condition  Outcome: Progressing     Problem: SAFETY ADULT  Goal: Maintain or return to baseline ADL function  Description  INTERVENTIONS:  -  Assess patient's ability to carry out ADLs; assess patient's baseline for ADL function and identify physical deficits which impact ability to perform ADLs (bathing, care of mouth/teeth, toileting, grooming, dressing, etc )  - Assess/evaluate cause of self-care deficits   - Assess range of motion  - Assess patient's mobility; develop plan if impaired  - Assess patient's need for assistive devices and provide as appropriate  - Encourage maximum independence but intervene and supervise when necessary  - Involve family in performance of ADLs  - Assess for home care needs following discharge   - Consider OT consult to assist with ADL evaluation and planning for discharge  - Provide patient education as appropriate  Outcome: Progressing  Goal: Maintain or return mobility status to optimal level  Description  INTERVENTIONS:  - Assess patient's baseline mobility status (ambulation, transfers, stairs, etc )    - Identify cognitive and physical deficits and behaviors that affect mobility  - Identify mobility aids required to assist with transfers and/or ambulation (gait belt, sit-to-stand, lift, walker, cane, etc )  - [place] fall precautions as indicated by assessment  - Record patient progress and toleration of activity level on Mobility SBAR; progress patient to next Phase/Stage  - Instruct patient to call for assistance with activity based on assessment  - Consider rehabilitation consult to assist with strengthening/weightbearing, etc   Outcome: Progressing     Problem: DISCHARGE PLANNING  Goal: Discharge to home or other facility with appropriate resources  Description  INTERVENTIONS:  - Identify barriers to discharge w/patient and caregiver  - Arrange for needed discharge resources and transportation as appropriate  - Identify discharge learning needs (meds, wound care, etc )  - Arrange for interpretive services to assist at discharge as needed  - Refer to Case Management Department for coordinating discharge planning if the patient needs post-hospital services based on physician/advanced practitioner order or complex needs related to functional status, cognitive ability, or social support system  Outcome: Progressing     Problem: Knowledge Deficit  Goal: Patient/family/caregiver demonstrates understanding of disease process, treatment plan, medications, and discharge instructions  Description  Complete learning assessment and assess knowledge base    Interventions:  - Provide teaching at level of understanding  - Provide teaching via preferred learning methods  Outcome: Progressing     Problem: NEUROSENSORY - ADULT  Goal: Achieves stable or improved neurological status  Description  INTERVENTIONS  - Monitor and report changes in neurological status  - Monitor vital signs such as temperature, blood pressure, glucose, and any other labs ordered   - Initiate measures to prevent increased intracranial pressure  - Monitor for seizure activity and implement precautions if appropriate      Outcome: Progressing  Goal: Remains free of injury related to seizures activity  Description  INTERVENTIONS  - Maintain airway, patient safety  and administer oxygen as ordered  - Monitor patient for seizure activity, document and report duration and description of seizure to physician/advanced practitioner  - If seizure occurs,  ensure patient safety during seizure  - Reorient patient post seizure  - Seizure pads on all 4 side rails  - Instruct patient/family to notify RN of any seizure activity including if an aura is experienced  - Instruct patient/family to call for assistance with activity based on nursing assessment  - Administer anti-seizure medications if ordered    Outcome: Progressing  Goal: Achieves maximal functionality and self care  Description  INTERVENTIONS  - Monitor swallowing and airway patency with patient fatigue and changes in neurological status  - Encourage and assist patient to increase activity and self care     - Encourage visually impaired, hearing impaired and aphasic patients to use assistive/communication devices  Outcome: Progressing     Problem: METABOLIC, FLUID AND ELECTROLYTES - ADULT  Goal: Electrolytes maintained within normal limits  Description  INTERVENTIONS:  - Monitor labs and assess patient for signs and symptoms of electrolyte imbalances  - Administer electrolyte replacement as ordered  - Monitor response to electrolyte replacements, including repeat lab results as appropriate  - Instruct patient on fluid and nutrition as appropriate  Outcome: Progressing  Goal: Fluid balance maintained  Description  INTERVENTIONS:  - Monitor labs   - Monitor I/O and WT  - Instruct patient on fluid and nutrition as appropriate  - Assess for signs & symptoms of volume excess or deficit  Outcome: Progressing  Goal: Glucose maintained within target range  Description  INTERVENTIONS:  - Monitor Blood Glucose as ordered  - Assess for signs and symptoms of hyperglycemia and hypoglycemia  - Administer ordered medications to maintain glucose within target range  - Assess nutritional intake and initiate nutrition service referral as needed  Outcome: Progressing

## 2020-02-25 NOTE — PHYSICAL THERAPY NOTE
Physical Therapy Treatment Note    Patient Name: Hilda Fiore    PWUZE'H Date: 2/25/2020     Problem List  Principal Problem:    Seizure-like activity ([facility] )  Active Problems:    Type 1 diabetes mellitus with hypoglycemia ([place] [address] )    History of lacunar cerebrovascular accident (CVA)    Homozygous MTHFR mutation [id number] ([facility] )    End-stage renal disease on peritoneal dialysis ([place] [address] )    Renovascular hypertension    Dyslipidemia       Past Medical History  Past Medical History:   Diagnosis Date    Acute kidney injury ([place] [address] )     Anxiety     Cerebellar stroke side undetermined 2015 2015,1/2018    Diabetes type 1, controlled ([place] [address] )     IDDM    Diarrhea     Gastroparesis     History of shingles 2010    History of transfusion 02/2018    Hypertension     Muscle weakness     general unsteadiness    Retinopathy         Past Surgical History  Past Surgical History:   Procedure Laterality Date    CARDIAC LOOP RECORDER  05/2018    EGD      EYE SURGERY      PERITONEAL CATHETER INSERTION N/A 8/27/2018    Procedure: UNROOF PD CATHETER;  Surgeon: Homero Bee DO;  [facility];  Service: General    [place] ESOPHAGOGASTRODUODENOSCOPY TRANSORAL DIAGNOSTIC N/A 4/18/2019    Procedure: ESOPHAGOGASTRODUODENOSCOPY (EGD); Surgeon: Coreen Cain MD;  Location: [facility]; Service: Gastroenterology    [place] LAP INSERTION TUNNELED INTRAPERITONEAL CATHETER N/A 8/6/2018    Procedure: LAPAROSCOPIC PD CATHETER PLACEMENT;  Surgeon: Homero Bee DO;  [facility];  Service: General    TONSILLECTOMY             02/25/20 1359   Pain Assessment   Pain Assessment No/denies pain   Pain Score No Pain   Restrictions/Precautions   Weight Bearing Precautions Per Order No   Other Precautions Cognitive; Chair Alarm; Bed Alarm; Fall Risk;Multiple lines   General   Chart Reviewed Yes   Family/Caregiver Present No   Cognition   Overall Cognitive Status Impaired Arousal/Participation Responsive   Attention Attends with cues to redirect   Orientation Level Oriented X4   Memory Unable to assess   Following Commands Follows one step commands with increased time or repetition   Comments Pt is pleasant; requires VC for safety  Subjective   Subjective Reports he still feels weak but better   Bed Mobility   Supine to Sit 5  Supervision   Additional items Assist x 1; Increased time required;Verbal cues;HOB elevated   Additional Comments Pt went from supine to sit without physical assistance  HOB elevated for ease  Required increased time to complete  Transfers   Sit to Stand 3  Moderate assistance   Additional items Assist x 2; Increased time required;Verbal cues  (Progressed to mod A x 1)   Stand to Sit 4  Minimal assistance   Additional items Assist x 1;Trapeze bar;Verbal cues   Additional Comments Pt required mod A x 2 for sit to stand transfer for moderate force production; progressed to mod A x 1 for sit to stand transfer  VC for proper hand placement  Performed x3 STS transfers t/o session  Concluded session with patient seated OOB with OT present  Ambulation/Elevation   Gait pattern Ataxia; Short stride; Excessively slow  (Lateral LOB )   Gait Assistance   ((Min-mod A x 1 for LOB and RW management ) )   Additional items Assist x 1;Verbal cues  (+ chair follow)   Assistive Device Rolling walker   Distance 3 feet 20 feet x 2    Balance   Static Sitting Fair -   Dynamic Sitting Poor +   Static Standing Poor   Dynamic Standing Poor   Ambulatory Poor   Endurance Deficit   Endurance Deficit Yes   Endurance Deficit Description Limited by fatigue and activity toelrance   Activity Tolerance   Activity Tolerance Patient limited by fatigue   Medical Staff Made Aware Romana Fang   Nurse Made Aware Yes, Per MIKAEL Snell   Exercises   Hip Flexion Sitting;10 reps;Right;Left;Bilateral   Assessment   Prognosis Good   Problem List Decreased strength;Decreased endurance; Impaired balance;Decreased mobility; Decreased coordination;Decreased safety awareness; Impaired judgement;Pain;Obesity   Assessment Patient seen for physical therapy session with a focus on gait training, functional transfers, there-ex, and activity tolerance  Primary limitations are fatigue and generalized weakness  Pt made significant improvement from previous PT session and increased ambulation distances  Pt ambulated 20 feet x 2 with chair follow, RW, and mod-min A x  1  Continues to require assistance with RW management and required seated rest break  Progress from A x 2 to A x 1 with sit<> stand transfers  Recommend STR upon discharge once medically stable  Barriers to Discharge Decreased caregiver support; Inaccessible home environment   Goals   Patient Goals To get stronger   STG Expiration Date 03/07/20   PT Treatment Day 2   Plan   Treatment/Interventions Functional transfer training;LE strengthening/ROM; Therapeutic exercise; Endurance training;Patient/family training;Bed mobility;Gait training   Progress Progressing toward goals   PT Frequency   (3-5x/wk)   Recommendation   Recommendation Post acute IP rehab   Equipment Recommended Walker   PT - OK to Discharge Yes  (to rehab once medically stable)       Mk Victoria PT, DPT

## 2020-02-25 NOTE — SOCIAL WORK
S/w pt & his mother at bedside & discussed therapy recommendations for IPR  Pt's mother requested referral to [facility]  A post acute care recommendation was made by your care team for Acute Rehab  Discussed [place] [facility] with both patient and caregiver  List of facilities given to both patient and caregiver via in person  both patient and caregiver aware the list is custom filtered for them by preference  and that [facility] post acute providers are designated

## 2020-02-25 NOTE — CONSULTS
Consultation - [facility]    Patient Information: Sruthi Mckeon 37 y o  male MRN: [id number]  Unit/Bed#: [facility] 376-19 Encounter: [id number]  Admitting Physician: Mendel Ferris, MD  PCP: Daniel Daniel DO  Date of Consultation:  02/25/20    ASSESSMENT:  61-year-old male with multi-system complications secondary to type 1 diabetes with a new onset seizure with known history of strokes  PLAN:    1  Type 1 diabetes with complications and severe hyperglycemia  He is presently on basal of 10 units in the morning and 14 units at night but is not getting any mealtime coverage  He also was receiving a high dextrose bath peritoneal dialysis which can all contribute hyperglycemia  Will add mealtime coverage 6 units with each meal along with the correctional insulin of algorithm 4  Nephrology will use a reduced dextrose bath for peritoneal dialysis starting tonight  Will continue the present basal regimen  And titrate according to blood glucose levels over the next 24 hours  Note he has mild gastroparesis which can contribute to postprandial hypoglycemia however he seems to be tolerating diet without metoclopramide  Reason for consultation:   Type 1 diabetes with hyperglycemia    History of Present Illness:    Mckenzie Vanessa is a 37 y o  male who presents with generalized weakness, vertigo and word-finding difficulty  This was followed by seizures on the day after  Endocrinology was consulted for persistent hyperglycemia  He was 1st diagnosed at age 1 with type 1 diabetes and has associated microvascular and macrovascular complications including retinopathy requiring injections, end-stage renal disease on peritoneal dialysis, diabetic gastroparesis, peripheral neuropathy and Charcot foot with deformity, history of strokes but no previous heart attacks or intermittent claudication  He follows with [facility] endocrinology with Dr Enid Hendrickson    More recently he had hypoglycemia and hyperglycemia and was placed on dex com with improvement in his overall glycemic control  Other relevant medical problems include hypertension and dyslipidemia  He is currently waiting in the pancreas and renal transplant list at [facility]  He was never on an insulin pump  He is presently on Basaglar 12 units in the morning and 15 units at bedtime and Admelog 5 units with each meal with ISF 25  Most recent A1c was 5 6  Review of Systems:    Review of Systems    Past Medical and Surgical History:     Past Medical History:   Diagnosis Date    Acute kidney injury ([facility] [address] )     Anxiety     Cerebellar stroke side undetermined 2015 2015,1/2018    Diabetes type 1, controlled ([facility] [address] )     IDDM    Diarrhea     Gastroparesis     History of shingles 2010    History of transfusion 02/2018    Hypertension     Muscle weakness     general unsteadiness    Retinopathy        Past Surgical History:   Procedure Laterality Date    CARDIAC LOOP RECORDER  05/2018    EGD      EYE SURGERY      PERITONEAL CATHETER INSERTION N/A 8/27/2018    Procedure: UNROOF PD CATHETER;  Surgeon: Delisa Nickerson DO;  [facility]: [facility];  Service: General    WY ESOPHAGOGASTRODUODENOSCOPY TRANSORAL DIAGNOSTIC N/A 4/18/2019    Procedure: ESOPHAGOGASTRODUODENOSCOPY (EGD); Surgeon: Selin Hewitt MD;  Location: [facility]; Service: Gastroenterology    [place] LAP INSERTION TUNNELED INTRAPERITONEAL CATHETER N/A 8/6/2018    Procedure: LAPAROSCOPIC PD CATHETER PLACEMENT;  Surgeon: Delisa Nickerson DO;  Location: [facility];  Service: General    TONSILLECTOMY         Meds/Allergies:    PTA meds:   Prior to Admission Medications   Prescriptions Last Dose Informant Patient Reported? Taking?    ADMELOG SOLOSTAR 100 units/mL injection pen   No No   Sig: INJECT 10 UNITS WITH A SLIDING SCALE OF 1 UNIT FOR EVERY 25 OVER 150 SUBCUTANEOUSLY THREE TIMES DAILY WITH MEALS   Patient taking differently: 5 Units 3 (three) times a day with meals    B-D ULTRAFINE III SHORT PEN 31G X 8 MM MISC   No No   Sig: USE 1 PEN NEEDLE 8 TIMES DAILY   Cholecalciferol (VITAMIN D) 2000 units CAPS  Mother Yes No   Sig: Take 1 capsule by mouth daily   GLUCAGON EMERGENCY 1 MG injection   Yes No   Sig: INJECT 1MG SUBCUTANEOUSLY AS NEEDED (AS DIRECTED)   MAY REPEAT DOSE EVERY 20 MINUTES AS NEEDED   NIFEdipine ER (ADALAT CC) 60 MG 24 hr tablet  Mother No No   Sig: Take 1 tablet (60 mg total) by mouth daily   Probiotic Product (PROBIOTIC DAILY) CAPS  Self Yes No   Sig: Take 1 capsule by mouth daily at bedtime    Sucroferric Oxyhydroxide (VELPHORO PO)   Yes No   Si mg 3 (three) times a day with meals    aspirin 81 mg chewable tablet  Mother Yes No   Sig: Chew 81 mg daily   atorvastatin (LIPITOR) 40 mg tablet   No No   Sig: Take 1 tablet (40 mg total) by mouth every evening   Patient taking differently: Take 40 mg by mouth daily at bedtime    b complex vitamins capsule  Mother Yes No   Sig: Take 1 capsule by mouth daily   calcium acetate (PHOSLO) 667 mg capsule  Mother No No   Sig: Take 1 capsule (667 mg total) by mouth 3 (three) times a day with meals   cinacalcet (SENSIPAR) 90 MG tablet   Yes No   Sig: Take 90 mg by mouth daily   cloNIDine (CATAPRES) 0 1 mg tablet   Yes No   Sig: Take 0 1 mg by mouth 3 (three) times a day May add another 0 1 as needed   escitalopram (LEXAPRO) 10 mg tablet   No No   Sig: Take 1 tablet (10 mg total) by mouth daily   famotidine (PEPCID) 20 mg tablet   No No   Sig: Take 1 tablet (20 mg total) by mouth daily at bedtime   Patient taking differently: Take 40 mg by mouth daily at bedtime    gabapentin (NEURONTIN) 100 mg capsule   Yes No   Sig: Take 100 mg by mouth daily at bedtime   gentamicin (GARAMYCIN) 0 1 % cream   Yes No   Sig: Apply 1 application topically daily    hydrALAZINE (APRESOLINE) 100 MG tablet  Self No No   Sig: Take 0 5 tablets (50 mg total) by mouth 3 (three) times a day   Patient taking differently: Take 50 mg by mouth 3 (three) times a day 100 mg twice daily   hydrOXYzine HCL (ATARAX) 10 mg tablet   No No   Sig: Take 1 tablet (10 mg total) by mouth every 6 (six) hours as needed for itching   Patient taking differently: Take 10 mg by mouth 2 (two) times a day    insulin glargine (BASAGLAR KWIKPEN) 100 units/mL injection pen   No No   Sig: Inject 12u in AM and 15u in PM   Patient taking differently: 10 Units every 12 (twelve) hours Inject 10units AM, 14units at night with sliding scale   labetalol (NORMODYNE) 300 mg tablet  Mother No No   Sig: Take 0 5 tablets (150 mg total) by mouth every 8 (eight) hours   Patient taking differently: Take 150 mg by mouth 2 (two) times a day    lisinopril (ZESTRIL) 40 mg tablet  Mother Yes No   Sig: Take 40 mg by mouth daily   ondansetron (ZOFRAN) 4 mg tablet   No No   Sig: Take 1 tablet (4 mg total) by mouth every 8 (eight) hours as needed for nausea or vomiting   Patient taking differently: Take 4 mg by mouth every 8 (eight) hours as needed for nausea or vomiting    torsemide (DEMADEX) 100 mg tablet   Yes No   Sig: Take 100 mg by mouth daily with lunch       Facility-Administered Medications: None       Allergies:    Allergies   Allergen Reactions    Sulfa Antibiotics Rash     History:     Marital Status: Single   Occupation:   Substance Use History:   Social History     Substance and Sexual Activity   Alcohol Use Not Currently    Comment: rarely     Social History     Tobacco Use   Smoking Status Former Smoker    Packs/day: 0 50    Years: 12 00    Pack years: 6 00    Types: Cigarettes    Last attempt to quit: 2018    Years since quittin 0   Smokeless Tobacco Never Used   Tobacco Comment    quit 2018     Social History     Substance and Sexual Activity   Drug Use Yes    Frequency: 5 0 times per week    Types: Marijuana    Comment: medical       Family History:    Family History   Problem Relation Age of Onset   Brennan Breast cancer Mother     Hypertension Mother     Hyperlipidemia Father     Hypertension Father     Leukemia Maternal Grandmother     Hyperlipidemia Maternal Grandfather     Hypertension Maternal Grandfather     Hyperlipidemia Paternal Grandmother     Hypertension Paternal Grandmother     Heart disease Paternal Grandfather         cardiac disorder    Diabetes Paternal Grandfather    No history of diabetes with immediate family members  Physical Exam:     Vitals:   Blood Pressure: 146/79 (02/25/20 1111)  Pulse: 74 (02/25/20 1111)  Temperature: 97 9 °F (36 6 °C) (02/25/20 0715)  Temp Source: Oral (02/24/20 1700)  Respirations: 17 (02/25/20 0715)  Height: 5' 11" (180 3 cm) (02/21/20 1300)  Weight - Scale: 99 9 kg (220 lb 3 8 oz) (02/25/20 0942)  SpO2: 100 % (02/25/20 1111)    Physical Exam   Constitutional: He is oriented to person, place, and time  He appears well-developed  HENT:   Head: Normocephalic  Mouth/Throat: Oropharynx is clear and moist    Eyes: Pupils are equal, round, and reactive to light  Neck: Normal range of motion  No thyromegaly present  Cardiovascular: Normal rate and normal heart sounds  Pulmonary/Chest: Effort normal and breath sounds normal    Abdominal: Soft  Bowel sounds are normal  He exhibits distension  Musculoskeletal: He exhibits deformity  He exhibits no edema  Neurological: He is alert and oriented to person, place, and time  Skin: Capillary refill takes less than 2 seconds  No rash noted  There is pallor  Psychiatric: He has a normal mood and affect  Vitals reviewed          Lab and Imaging Results: I have personally reviewed pertinent films in PACS    Results from last 7 days   Lab Units 02/25/20  0759   WBC Thousand/uL 8 70   HEMOGLOBIN g/dL 8 6*   HEMATOCRIT % 25 4*   PLATELETS Thousands/uL 249   NEUTROS PCT % 74   LYMPHS PCT % 10*   MONOS PCT % 12   EOS PCT % 2     Results from last 7 days   Lab Units 02/25/20  0759  02/22/20  0501  02/21/20  0850   POTASSIUM mmol/L 4 1   < > 4 9   < >  --    CHLORIDE mmol/L 99*   < > 100   < >  --    CO2 mmol/L 22   < > 25   < >  --    CO2, I-STAT mmol/L  --   --   --   --  24   BUN mg/dL 47*   < > 54*   < >  --    CREATININE mg/dL 14 50*   < > 16 20*   < >  --    CALCIUM mg/dL 8 8   < > 8 6   < >  --    ALK PHOS U/L  --   --  65  --   --    ALT U/L  --   --  16  --   --    AST U/L  --   --  23  --   --    GLUCOSE, ISTAT mg/dl  --   --   --   --  275*    < > = values in this interval not displayed  Results from last 7 days   Lab Units 02/20/20  1804   INR  1 02     POC Glucose (mg/dl)   Date Value   02/25/2020 267 (H)   02/25/2020 337 (H)   02/25/2020 470 (H)   02/25/2020 424 (H)   02/24/2020 280 (H)   02/24/2020 261 (H)   02/24/2020 198 (H)   02/24/2020 150 (H)   02/24/2020 52 (L)   02/24/2020 116         ** Please Note: Dragon 360 Dictation voice to text software may have been used in the creation of this document   **

## 2020-02-25 NOTE — ASSESSMENT & PLAN NOTE
· Intubated then extubated 2/23  · Neurology consulted, appreciate input  · S/p EEG monitoring- no epileptiform activity seen as per Neurology  · As per Neurology thought to be due to uncontrolled hypertension  · Continue Depacon  · Outpatient Neurology follow-up

## 2020-02-25 NOTE — ASSESSMENT & PLAN NOTE
Uncontrolled  Patient initially on Cardene drip, now discontinued  Continue clonidine, hydralazine, Procardia

## 2020-02-25 NOTE — PLAN OF CARE
Problem: PHYSICAL THERAPY ADULT  Goal: Performs mobility at highest level of function for planned discharge setting  See evaluation for individualized goals  Description  Treatment/Interventions: LE strengthening/ROM, Functional transfer training, Therapeutic exercise, Patient/family training, Endurance training, Gait training, Bed mobility, OT, Spoke to case management, Spoke to nursing, Equipment eval/education          See flowsheet documentation for full assessment, interventions and recommendations  Outcome: Progressing  Note:   Prognosis: Good  Problem List: Decreased strength, Decreased endurance, Impaired balance, Decreased mobility, Decreased coordination, Decreased cognition, Impaired judgement, Decreased safety awareness  Assessment: Pt seen for session for transfers, gait and repositioning  Pt cooperative, nursing requesting assist for gait to BR   cooperative but continued weakness, unsteadiness  remains appropriate for rehab at d/c  Barriers to Discharge: Decreased caregiver support, Inaccessible home environment     Recommendation: Post acute IP rehab     PT - OK to Discharge: Yes(when stable to rehab)    See flowsheet documentation for full assessment

## 2020-02-26 ENCOUNTER — APPOINTMENT (INPATIENT)
Dept: RADIOLOGY | Facility: HOSPITAL | Age: 35
DRG: 100 | End: 2020-02-26
Attending: INTERNAL MEDICINE
Payer: MEDICARE

## 2020-02-26 LAB
ANION GAP SERPL CALCULATED.3IONS-SCNC: 14 MMOL/L (ref 4–13)
BUN SERPL-MCNC: 50 MG/DL (ref 5–25)
CALCIUM SERPL-MCNC: 8.9 MG/DL (ref 8.3–10.1)
CHLORIDE SERPL-SCNC: 98 MMOL/L (ref 100–108)
CO2 SERPL-SCNC: 22 MMOL/L (ref 21–32)
CREAT SERPL-MCNC: 14.5 MG/DL (ref 0.6–1.3)
GFR SERPL CREATININE-BSD FRML MDRD: 4 ML/MIN/1.73SQ M
GLUCOSE SERPL-MCNC: 112 MG/DL (ref 65–140)
GLUCOSE SERPL-MCNC: 175 MG/DL (ref 65–140)
GLUCOSE SERPL-MCNC: 193 MG/DL (ref 65–140)
GLUCOSE SERPL-MCNC: 210 MG/DL (ref 65–140)
GLUCOSE SERPL-MCNC: 53 MG/DL (ref 65–140)
GLUCOSE SERPL-MCNC: 63 MG/DL (ref 65–140)
GLUCOSE SERPL-MCNC: 80 MG/DL (ref 65–140)
GLUCOSE SERPL-MCNC: 95 MG/DL (ref 65–140)
POTASSIUM SERPL-SCNC: 3.8 MMOL/L (ref 3.5–5.3)
SODIUM SERPL-SCNC: 134 MMOL/L (ref 136–145)

## 2020-02-26 PROCEDURE — 73080 X-RAY EXAM OF ELBOW: CPT

## 2020-02-26 PROCEDURE — 80048 BASIC METABOLIC PNL TOTAL CA: CPT | Performed by: INTERNAL MEDICINE

## 2020-02-26 PROCEDURE — 99232 SBSQ HOSP IP/OBS MODERATE 35: CPT | Performed by: INTERNAL MEDICINE

## 2020-02-26 PROCEDURE — 97535 SELF CARE MNGMENT TRAINING: CPT

## 2020-02-26 PROCEDURE — 82948 REAGENT STRIP/BLOOD GLUCOSE: CPT

## 2020-02-26 PROCEDURE — 99233 SBSQ HOSP IP/OBS HIGH 50: CPT | Performed by: INTERNAL MEDICINE

## 2020-02-26 RX ORDER — NIFEDIPINE 60 MG/1
60 TABLET, EXTENDED RELEASE ORAL 2 TIMES DAILY
Status: DISCONTINUED | OUTPATIENT
Start: 2020-02-26 | End: 2020-02-28 | Stop reason: HOSPADM

## 2020-02-26 RX ORDER — FAMOTIDINE 20 MG/1
20 TABLET, FILM COATED ORAL
Status: COMPLETED | OUTPATIENT
Start: 2020-02-26 | End: 2020-02-27

## 2020-02-26 RX ORDER — DEXTROSE MONOHYDRATE 25 G/50ML
25 INJECTION, SOLUTION INTRAVENOUS AS NEEDED
Status: DISCONTINUED | OUTPATIENT
Start: 2020-02-26 | End: 2020-02-26

## 2020-02-26 RX ORDER — DEXTROSE MONOHYDRATE 25 G/50ML
25 INJECTION, SOLUTION INTRAVENOUS AS NEEDED
Status: DISCONTINUED | OUTPATIENT
Start: 2020-02-26 | End: 2020-02-28 | Stop reason: HOSPADM

## 2020-02-26 RX ORDER — ONDANSETRON 4 MG/1
4 TABLET, ORALLY DISINTEGRATING ORAL EVERY 8 HOURS SCHEDULED
Status: DISCONTINUED | OUTPATIENT
Start: 2020-02-26 | End: 2020-02-28 | Stop reason: HOSPADM

## 2020-02-26 RX ORDER — INSULIN GLARGINE 100 [IU]/ML
8 INJECTION, SOLUTION SUBCUTANEOUS EVERY MORNING
Status: DISCONTINUED | OUTPATIENT
Start: 2020-02-27 | End: 2020-02-27

## 2020-02-26 RX ADMIN — ONDANSETRON 4 MG: 2 INJECTION INTRAMUSCULAR; INTRAVENOUS at 11:45

## 2020-02-26 RX ADMIN — LABETALOL 20 MG/4 ML (5 MG/ML) INTRAVENOUS SYRINGE 20 MG: at 07:47

## 2020-02-26 RX ADMIN — FAMOTIDINE 20 MG: 20 TABLET ORAL at 22:50

## 2020-02-26 RX ADMIN — HYDRALAZINE HYDROCHLORIDE 50 MG: 50 TABLET ORAL at 17:08

## 2020-02-26 RX ADMIN — HYDRALAZINE HYDROCHLORIDE 50 MG: 50 TABLET ORAL at 22:51

## 2020-02-26 RX ADMIN — MELATONIN 2000 UNITS: at 08:55

## 2020-02-26 RX ADMIN — ACETAMINOPHEN 650 MG: 325 TABLET ORAL at 08:56

## 2020-02-26 RX ADMIN — HEPARIN SODIUM 5000 UNITS: 5000 INJECTION INTRAVENOUS; SUBCUTANEOUS at 22:51

## 2020-02-26 RX ADMIN — ATORVASTATIN CALCIUM 40 MG: 40 TABLET, FILM COATED ORAL at 22:47

## 2020-02-26 RX ADMIN — ERYTHROMYCIN 250 MG: 250 TABLET, FILM COATED ORAL at 11:43

## 2020-02-26 RX ADMIN — ONDANSETRON 4 MG: 4 TABLET, ORALLY DISINTEGRATING ORAL at 17:08

## 2020-02-26 RX ADMIN — HYDRALAZINE HYDROCHLORIDE 20 MG: 20 INJECTION INTRAMUSCULAR; INTRAVENOUS at 11:00

## 2020-02-26 RX ADMIN — ASPIRIN 81 MG 81 MG: 81 TABLET ORAL at 08:56

## 2020-02-26 RX ADMIN — CHLORHEXIDINE GLUCONATE 0.12% ORAL RINSE 15 ML: 1.2 LIQUID ORAL at 08:53

## 2020-02-26 RX ADMIN — LABETALOL HCL 300 MG: 200 TABLET, FILM COATED ORAL at 08:54

## 2020-02-26 RX ADMIN — ACETAMINOPHEN 650 MG: 325 TABLET ORAL at 17:12

## 2020-02-26 RX ADMIN — CLONIDINE HYDROCHLORIDE 0.1 MG: 0.1 TABLET ORAL at 17:08

## 2020-02-26 RX ADMIN — LACTOBACILLUS ACIDOPHILUS / LACTOBACILLUS BULGARICUS 1 PACKET: 100 MILLION CFU STRENGTH GRANULES at 22:54

## 2020-02-26 RX ADMIN — CALCIUM ACETATE 667 MG: 667 CAPSULE ORAL at 17:13

## 2020-02-26 RX ADMIN — HEPARIN SODIUM 5000 UNITS: 5000 INJECTION INTRAVENOUS; SUBCUTANEOUS at 06:33

## 2020-02-26 RX ADMIN — INSULIN LISPRO 4 UNITS: 100 INJECTION, SOLUTION INTRAVENOUS; SUBCUTANEOUS at 07:33

## 2020-02-26 RX ADMIN — VALPROIC ACID 750 MG: 250 CAPSULE, LIQUID FILLED ORAL at 08:53

## 2020-02-26 RX ADMIN — CLONIDINE HYDROCHLORIDE 0.1 MG: 0.1 TABLET ORAL at 22:47

## 2020-02-26 RX ADMIN — CHLORHEXIDINE GLUCONATE 0.12% ORAL RINSE 15 ML: 1.2 LIQUID ORAL at 22:46

## 2020-02-26 RX ADMIN — HYDRALAZINE HYDROCHLORIDE 50 MG: 50 TABLET ORAL at 08:54

## 2020-02-26 RX ADMIN — CALCIUM ACETATE 667 MG: 667 CAPSULE ORAL at 11:43

## 2020-02-26 RX ADMIN — NIFEDIPINE 60 MG: 60 TABLET, FILM COATED, EXTENDED RELEASE ORAL at 08:58

## 2020-02-26 RX ADMIN — INSULIN GLARGINE 14 UNITS: 100 INJECTION, SOLUTION SUBCUTANEOUS at 22:51

## 2020-02-26 RX ADMIN — INSULIN GLARGINE 10 UNITS: 100 INJECTION, SOLUTION SUBCUTANEOUS at 09:03

## 2020-02-26 RX ADMIN — LISINOPRIL 40 MG: 20 TABLET ORAL at 08:55

## 2020-02-26 RX ADMIN — VALPROIC ACID 750 MG: 250 CAPSULE, LIQUID FILLED ORAL at 22:49

## 2020-02-26 RX ADMIN — GENTAMICIN SULFATE 1 APPLICATION: 1 OINTMENT TOPICAL at 22:25

## 2020-02-26 RX ADMIN — CINACALCET HYDROCHLORIDE 90 MG: 30 TABLET, FILM COATED ORAL at 08:57

## 2020-02-26 RX ADMIN — LABETALOL HCL 300 MG: 200 TABLET, FILM COATED ORAL at 22:47

## 2020-02-26 RX ADMIN — ACETAMINOPHEN 650 MG: 325 TABLET ORAL at 02:05

## 2020-02-26 RX ADMIN — GABAPENTIN 100 MG: 100 CAPSULE ORAL at 22:50

## 2020-02-26 RX ADMIN — DEXTROSE MONOHYDRATE 25 G: 25 INJECTION, SOLUTION INTRAVENOUS at 14:00

## 2020-02-26 RX ADMIN — NIFEDIPINE 60 MG: 60 TABLET, FILM COATED, EXTENDED RELEASE ORAL at 17:12

## 2020-02-26 RX ADMIN — ONDANSETRON 4 MG: 2 INJECTION INTRAMUSCULAR; INTRAVENOUS at 07:34

## 2020-02-26 RX ADMIN — ERYTHROMYCIN 250 MG: 250 TABLET, FILM COATED ORAL at 06:33

## 2020-02-26 RX ADMIN — HEPARIN SODIUM 5000 UNITS: 5000 INJECTION INTRAVENOUS; SUBCUTANEOUS at 17:08

## 2020-02-26 RX ADMIN — TORSEMIDE 100 MG: 20 TABLET ORAL at 11:43

## 2020-02-26 RX ADMIN — LABETALOL 20 MG/4 ML (5 MG/ML) INTRAVENOUS SYRINGE 20 MG: at 12:05

## 2020-02-26 RX ADMIN — CLONIDINE HYDROCHLORIDE 0.1 MG: 0.1 TABLET ORAL at 06:33

## 2020-02-26 RX ADMIN — ONDANSETRON 4 MG: 4 TABLET, ORALLY DISINTEGRATING ORAL at 22:50

## 2020-02-26 RX ADMIN — CALCIUM ACETATE 667 MG: 667 CAPSULE ORAL at 07:34

## 2020-02-26 NOTE — PROGRESS NOTES
NEPHROLOGY PROGRESS NOTE   Lakeisha Mckeon 37 y o  male MRN: [id number]  Unit/Bed#: [facility] 708-01 Encounter: [id number]      ASSESSMENT/PLAN:  1  ESRD:  On CCPD and follows at [facility]  · Current orders: 10 hours, 12 5L total volume, 2 2L exchanges with 1 5L last fill   · EDW 94kg  · Was on all 2 5% exchanges but yesterday was changed to 1 5% alternating 2 5% PD solution due to hyperglycemia  · extraneal last fill  · UF 1 9 last night  · Will continue 2 5% alternating 1 5% exchanges tonight since glucose improved   2  Status epilepticus: neuro on board and felt to be provoked seizure from uncontrolled HTN  3  Anemia of CKD:  HETAL on hold due to possible seizure  · hgb trending down to 8 6 yesterday  4  Mineral bone disease of CKD:  Continue calcium acetate with meals, Sensipar and vitamin-D replacement  5  Hypertension:  Blood pressure elevated  · Currently on clonidine 0 1mg q8h, hydralazine 50mg tid, labetalol 300mg q12h, lisinopril 40mg daily, nifedipine 60mg daily, torsemide 100mg daily   · Will d/w attending for  Medication changes   6  DM I:  Glucose improving with adjusting dextrose concentration in peritoneal dialysis and insulin adjustment per Endocrinology      SUBJECTIVE:  Feeling better today  His glucose improved with changing PD prescription  Blood pressure remains elevated      OBJECTIVE:  Current Weight: Weight - Scale: 99 9 kg (220 lb 4 8 oz)  Vitals:    02/26/20 0854   BP: (!) 192/88   Pulse: 79   Resp:    Temp:    SpO2:        Intake/Output Summary (Last 24 hours) at 2/26/2020 1049  Last data filed at 2/26/2020 6074  Gross per 24 hour   Intake 660 ml   Output 250 ml   Net 410 ml       General:  No acute distress  Skin:  No rash  Eyes:  Sclerae anicteric  ENT:  Moist mucous membranes  Neck:  Trachea midline with no JVD  Chest:  Clear to auscultation bilaterally  CVS:  Regular rate and rhythm  Abdomen:  Soft, nontender, nondistended  Extremities:  No edema  Neuro:  Awake and alert  Psych: Appropriate affect      Medications:  Scheduled Meds:    Current Facility-Administered Medications:  acetaminophen 650 mg Oral Q8H PRN Shawn Bone, DO   aspirin 81 mg Oral Daily Anabel Marion, DO   atorvastatin 40 mg Oral HS Anabel Marion, DO   calcium acetate 667 mg Oral TID With Meals Shawn Bone, DO   chlorhexidine 15 mL Swish & Spit Q12H [address] Anabel ROSS, DO   cholecalciferol 2,000 Units Oral Daily Anabel Marion, DO   cinacalcet 90 mg Oral Daily Anabel Marion, DO   cloNIDine 0 1 mg Oral Q8H [address] Anabel Marion, DO   dextrose 13 mL Intravenous Once Shawn Bone, DO   diphenhydrAMINE (BENADRYL) IVPB 50 mg Intravenous Daily PRN Anabel Marion, DO   erythromycin base 250 mg Oral TID AC Anabel Marion, DO   famotidine 20 mg Oral BID Beatriz Torres MD   gabapentin 100 mg Oral HS Anabel Marion, DO   gentamicin 1 application Topical Daily Anabel Marion, DO   heparin (porcine) 5,000 Units Subcutaneous Q8H [address],    hydrALAZINE 20 mg Intravenous Q4H PRN Anabel Marion, DO   hydrALAZINE 50 mg Oral TID Anabel Marion, DO   insulin glargine 10 Units Subcutaneous QAM Anabel Marion, DO   insulin glargine 14 Units Subcutaneous HS Anabel Marion, DO   insulin lispro 2-12 Units Subcutaneous 4x Daily (AC & HS) Mary Oneil PA-C   insulin lispro 6 Units Subcutaneous TID With Meals Mari Mason MD   labetalol 300 mg Oral Q12H [address] Anabel Marion, DO   Labetalol HCl 20 mg Intravenous Q2H PRN Anabel Marion, DO   lactobacillus acidophilus-bulgaricus 1 packet Oral HS Anabel Marion, DO   lisinopril 40 mg Oral Daily Anabel Marion, DO   NIFEdipine 60 mg Oral Daily Shawn Bone, DO   ondansetron 4 mg Intravenous Q4H PRN Beatriz Torres MD   torsemide 100 mg Oral Daily With KickAss Candy, DO   valproic acid 750 mg Oral Q12H Pop Goldman MD       PRN Meds:   acetaminophen    diphenhydrAMINE (BENADRYL) IVPB    hydrALAZINE    Labetalol HCl    ondansetron    Laboratory Results:  Results from last 7 days   Lab Units 02/26/20  0500 02/25/20  0759 02/24/20  0601 02/23/20  0437 02/22/20  0501 02/21/20  1150 02/21/20  0945 02/21/20  0850 02/21/20  0429 02/20/20  1804   WBC Thousand/uL  --  8 70 9 90 10 91* 12 32* 8 65 7 82  --  9 21 7 85   HEMOGLOBIN g/dL  --  8 6* 9 1* 9 5* 9 7* 9 2* 9 8*  --  11 1* 10 4*   I STAT HEMOGLOBIN g/dl  --   --   --   --   --   --   --  10 2*  --   --    HEMATOCRIT %  --  25 4* 28 1* 29 2* 30 1* 27 2* 29 0*  --  34 1* 32 6*   HEMATOCRIT, ISTAT %  --   --   --   --   --   --   --  30*  --   --    PLATELETS Thousands/uL  --  249 242 254 249 253 261  --  271 235   SODIUM mmol/L 134* 134* 142 141 139 137 142  --  143 143   POTASSIUM mmol/L 3 8 4 1 3 7 3 7 4 9 4 8 5 4*  --  4 6 5 6*   CHLORIDE mmol/L 98* 99* 105 103 100 100 100  --  101 101   CO2 mmol/L 22 22 23 24 25 24 24  --  27 29   CO2, I-STAT mmol/L  --   --   --   --   --   --   --  24  --   --    BUN mg/dL 50* 47* 47* 52* 54* 50* 50*  --  52* 53*   CREATININE mg/dL 14 50* 14 50* 15 40* 15 40* 16 20* 15 20* 15 82*  --  15 44* 15 32*   CALCIUM mg/dL 8 9 8 8 9 7 9 2 8 6 8 9 9 0  --  9 0 8 7   MAGNESIUM mg/dL  --   --   --   --   --  2 8* 2 8*  --   --  2 5   PHOSPHORUS mg/dL  --   --  8 8*  --   --   --   --   --   --   --    GLUCOSE, ISTAT mg/dl  --   --   --   --   --   --   --  275*  --   --

## 2020-02-26 NOTE — PROGRESS NOTES
Assessment:  12-year-old male with multi-system complications secondary to type 1 diabetes with a new onset seizure with known history of strokes      PLAN:     1  Type 1 diabetes with microvascular and macrovascular complications  In last 24 hours, blood glucose was ranging between 80 at bedtime and 200 during peritoneal dialysis overnight  He was alternating between 1 25% and 2 5% dextrose bags for peritoneal dialysis  He is presently on basal of 10 units in the morning and 14 units at night and mealtime coverage 6 units with each meal along with the correctional insulin of algorithm 4  He reports to not eating his regular meals and his postprandial glucose has been on the lower side  Will reduce mealtime coverage to 4 units with each meals  Reduce in the a m  Dose Lantus to 8 units  Monitor blood glucose next 24 hours and titrate accordingly  His overnight hyperglycemia is most likely associated with the peritoneal dialysis  S:  Overnight tolerated peritoneal dialysis fairly well  Eating less than his usual   No fevers chills  No vomiting but reports nausea  O:  Patient seen and examined personally  BP (!) 192/88   Pulse 79   Temp 98 3 °F (36 8 °C)   Resp 18   Ht 5' 11" (1 803 m)   Wt 99 9 kg (220 lb 4 8 oz)   SpO2 98%   BMI 30 73 kg/m²     Physical Exam   Constitutional: He is oriented to person, place, and time  HENT:   Head: Normocephalic  Mouth/Throat: Oropharynx is clear and moist    Eyes: Pupils are equal, round, and reactive to light  EOM are normal    Musculoskeletal: He exhibits no deformity  Neurological: He is alert and oriented to person, place, and time  Skin: There is pallor  Vitals reviewed          Current Facility-Administered Medications:  acetaminophen 650 mg Oral Q8H PRN Stella Jacobsen DO   aspirin 81 mg Oral Daily Anabel Schultz DO   atorvastatin 40 mg Oral HS Anabel Marion, DO   calcium acetate 667 mg Oral TID With Meals Stella Jacobsen DO chlorhexidine 15 mL Swish & Spit Q12H [address], DO   cholecalciferol 2,000 Units Oral Daily Anabel Schultz, DO   cinacalcet 90 mg Oral Daily Anabel Schultz, DO   cloNIDine 0 1 mg Oral Q8H [facility] Anabel Schultz, DO   dextrose 13 mL Intravenous Once Sonal Mcbride, DO   diphenhydrAMINE (BENADRYL) IVPB 50 mg Intravenous Daily PRN Sonal Mcbride, DO   famotidine 20 mg Oral HS Vini Pinon MD   gabapentin 100 mg Oral HS Anabel Begumlas, DO   gentamicin 1 application Topical Daily Anabel Marion, DO   heparin (porcine) 5,000 Units Subcutaneous Q8H [address], DO   hydrALAZINE 20 mg Intravenous Q4H PRN Anabel Schultz, DO   hydrALAZINE 50 mg Oral TID Sonal Mcbride, DO   insulin glargine 10 Units Subcutaneous QAM Anabel Schultz, DO   insulin glargine 14 Units Subcutaneous HS Sonal Mcbride, DO   insulin lispro 2-12 Units Subcutaneous 4x Daily (AC & HS) Madhuri Beach PA-C   insulin lispro 6 Units Subcutaneous TID With Meals Sin Galeana MD   labetalol 300 mg Oral Q12H [facility] Anabel Schultz, DO   Labetalol HCl 20 mg Intravenous Q2H PRN Anabel Schultz, DO   lactobacillus acidophilus-bulgaricus 1 packet Oral HS Anabel Schultz, DO   lisinopril 40 mg Oral Daily Anabel Begumlas, DO   NIFEdipine 60 mg Oral BID Sarai Aparicio, DO   ondansetron 4 mg Intravenous Q4H PRN Vini Pinon MD   torsemide 100 mg Oral Daily With [place], DO   valproic acid 750 mg Oral Q12H Diony Connell MD        Lab, Imaging and other studies:   POC Glucose (mg/dl)   Date Value   02/26/2020 112   02/26/2020 210 (H)   02/26/2020 175 (H)   02/25/2020 80   02/25/2020 111   02/25/2020 267 (H)   02/25/2020 337 (H)   02/25/2020 470 (H)   02/25/2020 424 (H)   02/24/2020 280 (H)

## 2020-02-26 NOTE — PLAN OF CARE
Problem: OCCUPATIONAL THERAPY ADULT  Goal: Performs self-care activities at highest level of function for planned discharge setting  See evaluation for individualized goals  Description  Treatment Interventions: ADL retraining, Functional transfer training, UE strengthening/ROM, Endurance training, Cognitive reorientation, Patient/family training, Equipment evaluation/education, Compensatory technique education, Continued evaluation, Energy conservation, Activityengagement          See flowsheet documentation for full assessment, interventions and recommendations  Outcome: Progressing  Note:   Limitation: Decreased ADL status, Decreased Safe judgement during ADL, Decreased endurance, Decreased self-care trans, Decreased high-level ADLs, Decreased sensation  Prognosis: Fair  Assessment: Pt participated in occupational therapy with focus on activity tolerance,  bed mob, functional transfers/mob, standing tolerance and balance for pt engagement in UB self-care  Pt cleared by [address] [place] [address] for pt participation in occupational therapy  Pt received HOB raised/supine pt sitting upright and agreeable to therapy     Pt reported his  goal to be able to return to playing with his niece and nephews  Pt requires  Assist for functional transfers/mob with RW and chair set up at bathroom sink 2* pt decreased overall strength and limited activity tolerance  Pt able to tolerate grooming tasks/shaving seated at bathroom sink after OT set up and instruction  Pt will require in-pt rehab to continue to address pt noted deficits which currently impair pt ADL and functional mob          OT Discharge Recommendation: Short Term Rehab  OT - OK to Discharge: Yes(when medically stable)

## 2020-02-26 NOTE — RESTORATIVE TECHNICIAN NOTE
Restorative Specialist Mobility Note       Activity: Ambulate in gonzalez, [address] privileges, Ambulate in room, Chair, Dangle, Stand at bedside(Educated/encouraged pt to ambulate with assistance 3-4 x's/day  Chair alarm on   Pt callbell, phone/tray within reach )     Assistive Device: Front wheel walker       Filiberto Padgett BS, Restorative Technician, [employer] [facility]

## 2020-02-27 LAB
GLUCOSE SERPL-MCNC: 115 MG/DL (ref 65–140)
GLUCOSE SERPL-MCNC: 131 MG/DL (ref 65–140)
GLUCOSE SERPL-MCNC: 159 MG/DL (ref 65–140)
GLUCOSE SERPL-MCNC: 188 MG/DL (ref 65–140)
GLUCOSE SERPL-MCNC: 209 MG/DL (ref 65–140)
GLUCOSE SERPL-MCNC: 236 MG/DL (ref 65–140)

## 2020-02-27 PROCEDURE — 97116 GAIT TRAINING THERAPY: CPT

## 2020-02-27 PROCEDURE — 99232 SBSQ HOSP IP/OBS MODERATE 35: CPT | Performed by: INTERNAL MEDICINE

## 2020-02-27 PROCEDURE — 82948 REAGENT STRIP/BLOOD GLUCOSE: CPT

## 2020-02-27 PROCEDURE — 97530 THERAPEUTIC ACTIVITIES: CPT

## 2020-02-27 PROCEDURE — 99233 SBSQ HOSP IP/OBS HIGH 50: CPT | Performed by: INTERNAL MEDICINE

## 2020-02-27 PROCEDURE — 97535 SELF CARE MNGMENT TRAINING: CPT

## 2020-02-27 RX ORDER — PANTOPRAZOLE SODIUM 40 MG/1
40 TABLET, DELAYED RELEASE ORAL
Status: DISCONTINUED | OUTPATIENT
Start: 2020-02-28 | End: 2020-02-28 | Stop reason: HOSPADM

## 2020-02-27 RX ORDER — DOXAZOSIN 2 MG/1
2 TABLET ORAL
Status: DISCONTINUED | OUTPATIENT
Start: 2020-02-27 | End: 2020-02-28 | Stop reason: HOSPADM

## 2020-02-27 RX ORDER — INSULIN GLARGINE 100 [IU]/ML
5 INJECTION, SOLUTION SUBCUTANEOUS EVERY MORNING
Status: DISCONTINUED | OUTPATIENT
Start: 2020-02-28 | End: 2020-02-27

## 2020-02-27 RX ADMIN — HEPARIN SODIUM 5000 UNITS: 5000 INJECTION INTRAVENOUS; SUBCUTANEOUS at 06:01

## 2020-02-27 RX ADMIN — CALCIUM ACETATE 667 MG: 667 CAPSULE ORAL at 09:23

## 2020-02-27 RX ADMIN — HEPARIN SODIUM 5000 UNITS: 5000 INJECTION INTRAVENOUS; SUBCUTANEOUS at 21:39

## 2020-02-27 RX ADMIN — HEPARIN SODIUM 5000 UNITS: 5000 INJECTION INTRAVENOUS; SUBCUTANEOUS at 13:40

## 2020-02-27 RX ADMIN — NIFEDIPINE 60 MG: 60 TABLET, FILM COATED, EXTENDED RELEASE ORAL at 16:49

## 2020-02-27 RX ADMIN — CLONIDINE HYDROCHLORIDE 0.1 MG: 0.1 TABLET ORAL at 21:38

## 2020-02-27 RX ADMIN — CINACALCET HYDROCHLORIDE 90 MG: 30 TABLET, FILM COATED ORAL at 09:25

## 2020-02-27 RX ADMIN — CHLORHEXIDINE GLUCONATE 0.12% ORAL RINSE 15 ML: 1.2 LIQUID ORAL at 21:33

## 2020-02-27 RX ADMIN — INSULIN GLARGINE 8 UNITS: 100 INJECTION, SOLUTION SUBCUTANEOUS at 09:26

## 2020-02-27 RX ADMIN — HYDRALAZINE HYDROCHLORIDE 50 MG: 50 TABLET ORAL at 09:24

## 2020-02-27 RX ADMIN — INSULIN LISPRO 1 UNITS: 100 INJECTION, SOLUTION INTRAVENOUS; SUBCUTANEOUS at 21:40

## 2020-02-27 RX ADMIN — ONDANSETRON 4 MG: 4 TABLET, ORALLY DISINTEGRATING ORAL at 21:38

## 2020-02-27 RX ADMIN — VALPROIC ACID 750 MG: 250 CAPSULE, LIQUID FILLED ORAL at 21:37

## 2020-02-27 RX ADMIN — ONDANSETRON 4 MG: 2 INJECTION INTRAMUSCULAR; INTRAVENOUS at 16:42

## 2020-02-27 RX ADMIN — INSULIN LISPRO 1 UNITS: 100 INJECTION, SOLUTION INTRAVENOUS; SUBCUTANEOUS at 16:48

## 2020-02-27 RX ADMIN — HYDRALAZINE HYDROCHLORIDE 50 MG: 50 TABLET ORAL at 21:33

## 2020-02-27 RX ADMIN — LABETALOL HCL 300 MG: 200 TABLET, FILM COATED ORAL at 09:23

## 2020-02-27 RX ADMIN — ASPIRIN 81 MG 81 MG: 81 TABLET ORAL at 09:22

## 2020-02-27 RX ADMIN — HYDRALAZINE HYDROCHLORIDE 50 MG: 50 TABLET ORAL at 16:38

## 2020-02-27 RX ADMIN — CHLORHEXIDINE GLUCONATE 0.12% ORAL RINSE 15 ML: 1.2 LIQUID ORAL at 09:23

## 2020-02-27 RX ADMIN — LABETALOL HCL 300 MG: 200 TABLET, FILM COATED ORAL at 21:36

## 2020-02-27 RX ADMIN — CLONIDINE HYDROCHLORIDE 0.1 MG: 0.1 TABLET ORAL at 06:01

## 2020-02-27 RX ADMIN — LACTOBACILLUS ACIDOPHILUS / LACTOBACILLUS BULGARICUS 1 PACKET: 100 MILLION CFU STRENGTH GRANULES at 21:53

## 2020-02-27 RX ADMIN — DOXAZOSIN 2 MG: 2 TABLET ORAL at 21:37

## 2020-02-27 RX ADMIN — INSULIN GLARGINE 14 UNITS: 100 INJECTION, SOLUTION SUBCUTANEOUS at 21:39

## 2020-02-27 RX ADMIN — ONDANSETRON 4 MG: 4 TABLET, ORALLY DISINTEGRATING ORAL at 13:40

## 2020-02-27 RX ADMIN — FAMOTIDINE 20 MG: 20 TABLET ORAL at 21:36

## 2020-02-27 RX ADMIN — ONDANSETRON 4 MG: 4 TABLET, ORALLY DISINTEGRATING ORAL at 06:04

## 2020-02-27 RX ADMIN — CALCIUM ACETATE 667 MG: 667 CAPSULE ORAL at 11:16

## 2020-02-27 RX ADMIN — MELATONIN 2000 UNITS: at 09:24

## 2020-02-27 RX ADMIN — LISINOPRIL 40 MG: 20 TABLET ORAL at 09:22

## 2020-02-27 RX ADMIN — GABAPENTIN 100 MG: 100 CAPSULE ORAL at 21:37

## 2020-02-27 RX ADMIN — INSULIN LISPRO 4 UNITS: 100 INJECTION, SOLUTION INTRAVENOUS; SUBCUTANEOUS at 09:14

## 2020-02-27 RX ADMIN — ACETAMINOPHEN 650 MG: 325 TABLET ORAL at 01:30

## 2020-02-27 RX ADMIN — VALPROIC ACID 750 MG: 250 CAPSULE, LIQUID FILLED ORAL at 09:23

## 2020-02-27 RX ADMIN — GENTAMICIN SULFATE 1 APPLICATION: 1 OINTMENT TOPICAL at 22:03

## 2020-02-27 RX ADMIN — CALCIUM ACETATE 667 MG: 667 CAPSULE ORAL at 16:38

## 2020-02-27 RX ADMIN — NIFEDIPINE 60 MG: 60 TABLET, FILM COATED, EXTENDED RELEASE ORAL at 09:26

## 2020-02-27 RX ADMIN — CLONIDINE HYDROCHLORIDE 0.1 MG: 0.1 TABLET ORAL at 13:40

## 2020-02-27 RX ADMIN — TORSEMIDE 100 MG: 20 TABLET ORAL at 11:19

## 2020-02-27 RX ADMIN — ATORVASTATIN CALCIUM 40 MG: 40 TABLET, FILM COATED ORAL at 21:37

## 2020-02-27 RX ADMIN — ONDANSETRON 4 MG: 2 INJECTION INTRAMUSCULAR; INTRAVENOUS at 09:23

## 2020-02-27 NOTE — OCCUPATIONAL THERAPY NOTE
Occupational Therapy Treatment Note     02/27/20 1106   Restrictions/Precautions   Weight Bearing Precautions Per Order No   Other Precautions Chair Alarm; Fall Risk   Lifestyle   Autonomy I ADLS, IADLS, transfers and functional mobility PTA   Reciprocal Relationships Pt lives w/ parents; they are home as needed   Service to Others Pt does not work   Intrinsic Gratification Pt enjoy sports ([employer] and [employer]); and enjoys TV (political shows)   Pain Assessment   Pain Assessment 0-10   Pain Score No Pain   ADL   Where Assessed Chair   LB Dressing Assistance 4  Minimal Assistance   LB Dressing Deficit Pull up over hips; Tie shoes; Don/doff R shoe;Don/doff L shoe   LB Dressing Comments pt reports abdominal muscle cramping with bending down to attend to his shoes  Transfers   Sit to Stand 3  Moderate assistance   Additional items Assist x 1   Stand to Sit 4  Minimal assistance   Additional items Assist x 1; Increased time required   Cognition   Overall Cognitive Status Impaired   Arousal/Participation Cooperative   Attention Attends with cues to redirect   Orientation Level Oriented X4   Memory Decreased recall of precautions   Following Commands Follows one step commands without difficulty   Activity Tolerance   Activity Tolerance Patient limited by fatigue   Assessment   Assessment Pt participated in occupational therapy with focus on activity tolerance, functional transfers/standing balance and tolerance for pt engagement in functional self-care task/LB self-care and pt L B self-care  Pt cleared by RN/for pt participation in occupational therapy  Pt received sitting out of bed to bedside chair and agreeable to therapy  Pt continues to require assist for functional transfers 2* pt decreased overall strength  Pt required assist for LB self-care dressing 2* pt coordination and pt with limited activity tolerance    Pt will require in-pt rehab to continue to address these above noted deficits which currently impair pt ADL and functional mob      Plan   Treatment Interventions ADL retraining   Goal Expiration Date 03/09/20   OT Treatment Day 3   OT Frequency 3-5x/wk   Recommendation   OT Discharge Recommendation Short Term Rehab   Barthel Index   Feeding 10   Bathing 0   Grooming Score 5   Dressing Score 5   Bladder Score 10   Bowels Score 5   Toilet Use Score 5   Transfers (Bed/Chair) Score 5   Mobility (Level Surface) Score 0   Stairs Score 0   Barthel Index Score 45       Horacio Raza  [place]/L

## 2020-02-27 NOTE — SOCIAL WORK
TC from [facility] admissions & they can accept pt & will have a bed available tomorrow  Pt sleeping  S/w mother in room & they will accept the bed  Ecin message to [facility] & updated SLIM

## 2020-02-27 NOTE — PROGRESS NOTES
Progress Note Aaliyah Phelps 1985, 37 y o  male MRN: [id number]    [facility] Encounter: [id number]    Primary Care Provider: Lona Rodriguez DO   Date and time admitted to hospital: 2/21/2020 10:44 AM        Diabetic gastroparesis [facility])  Assessment & Plan  GI consulted, appreciate input  They have signed off  Continue erythromycin and famotidine, remains with symptoms of dyspepsia and bloating  Tolerating diet  Zofran p r n    Outpatient GI follow-up    Dyslipidemia  Assessment & Plan  Continue Lipitor    Renovascular hypertension  Assessment & Plan  Uncontrolled  Management as per nephrology, appreciate input  Continue clonidine, hydralazine, Procardia    End-stage renal disease on peritoneal dialysis [facility])  Assessment & Plan  Continue peritoneal dialysis  Management as per Nephrology    History of lacunar cerebrovascular accident (CVA)  Assessment & Plan  Continue aspirin and Lipitor    Type 1 diabetes mellitus with hypoglycemia [facility])  Assessment & Plan  Lab Results   Component Value Date    HGBA1C 5 6 02/21/2020       Recent Labs     02/26/20  1103 02/26/20  1329 02/26/20  1348 02/26/20  1558   POCGLU 112 53* 63* 95       Blood Sugar Average: Last 72 hrs:  (P) 581 [id number]     With episodes of hypo and hyperglycemia this hospitalization  Patient now with lower dextrose diasylate  Some hypoglycemia today, endocrine has titrated insulin- appreciate input  Continue Lantus 8 units in the morning and 14 units at night, Humalog 4 units with meals, SSI, Accu-Cheks    * Status epilepticus ([address] )  Assessment & Plan  · Intubated then extubated 2/23  · Neurology consulted, appreciate input  · S/p EEG monitoring- no epileptiform activity seen as per Neurology  · As per Neurology thought to be due to uncontrolled hypertension  · Continue Depacon  · Outpatient Neurology follow-up  · Resolved      VTE Pharmacologic Prophylaxis:   Pharmacologic: Heparin  Mechanical VTE Prophylaxis in Place: Yes    Patient Centered Rounds: I have performed bedside rounds with nursing staff today  Discussions with Specialists or Other Care Team Provider: nephrology    Education and Discussions with Family / Patient: patient, mom and dad    Time Spent for Care: 45 minutes  More than 50% of total time spent on counseling and coordination of care as described above  Current Length of Stay: 5 day(s)    Current Patient Status: Inpatient   Certification Statement: The patient will continue to require additional inpatient hospital stay due to medication titration    Discharge Plan: likely home    Code Status: Level 1 - Full Code      Subjective:   No acute overnight events  This morning patient complains of dyspepsia and bloating  Objective:     Vitals:   Temp (24hrs), Av 3 °F (36 8 °C), Min:98 3 °F (36 8 °C), Max:98 3 °F (36 8 °C)    Temp:  [98 3 °F (36 8 °C)] 98 3 °F (36 8 °C)  HR:  [68-80] 68  Resp:  [18] 18  BP: (135-213)/() 135/77  SpO2:  [97 %-99 %] 99 %  Body mass index is 30 73 kg/m²  Input and Output Summary (last 24 hours): Intake/Output Summary (Last 24 hours) at 2020  Last data filed at 2020 1700  Gross per 24 hour   Intake 810 ml   Output 250 ml   Net 560 ml       Physical Exam:     Physical Exam   Constitutional: He is oriented to person, place, and time  He appears well-developed and well-nourished  HENT:   Head: Normocephalic and atraumatic  Mouth/Throat: Oropharynx is clear and moist    Eyes: Pupils are equal, round, and reactive to light  Conjunctivae are normal    Neck: Neck supple  No JVD present  Cardiovascular: Normal rate, regular rhythm, normal heart sounds and intact distal pulses  Pulmonary/Chest: Effort normal and breath sounds normal    Abdominal: Soft  Bowel sounds are normal    Musculoskeletal: He exhibits no edema  L elbow Tender to palpation w/ edema and ecchymoses   Neurological: He is alert and oriented to person, place, and time     Skin: Skin is warm and dry  Capillary refill takes less than 2 seconds  Psychiatric: He has a normal mood and affect  His behavior is normal  Judgment and thought content normal    Nursing note and vitals reviewed  Additional Data:     Labs:    Results from last 7 days   Lab Units 02/25/20  0759   WBC Thousand/uL 8 70   HEMOGLOBIN g/dL 8 6*   HEMATOCRIT % 25 4*   PLATELETS Thousands/uL 249   NEUTROS PCT % 74   LYMPHS PCT % 10*   MONOS PCT % 12   EOS PCT % 2     Results from last 7 days   Lab Units 02/26/20  0500  02/22/20  0501   SODIUM mmol/L 134*   < > 139   POTASSIUM mmol/L 3 8   < > 4 9   CHLORIDE mmol/L 98*   < > 100   CO2 mmol/L 22   < > 25   BUN mg/dL 50*   < > 54*   CREATININE mg/dL 14 50*   < > 16 20*   ANION GAP mmol/L 14*   < > 14*   CALCIUM mg/dL 8 9   < > 8 6   ALBUMIN g/dL  --   --  3 0*   TOTAL BILIRUBIN mg/dL  --   --  0 47   ALK PHOS U/L  --   --  65   ALT U/L  --   --  16   AST U/L  --   --  23   GLUCOSE RANDOM mg/dL 193*   < > 223*    < > = values in this interval not displayed  Results from last 7 days   Lab Units 02/20/20  1804   INR  1 02     Results from last 7 days   Lab Units 02/26/20  1558 02/26/20  1348 02/26/20  1329 02/26/20  1103 02/26/20  0625 02/26/20  0212 02/25/20  2050 02/25/20  1636 02/25/20  1108 02/25/20  0559 02/25/20  0415 02/25/20  0231   POC GLUCOSE mg/dl 95 63* 53* 112 210* 175* 80 111 267* 337* 470* 424*     Results from last 7 days   Lab Units 02/21/20  0429   HEMOGLOBIN A1C % 5 6     Results from last 7 days   Lab Units 02/21/20  1150 02/21/20  0945   LACTIC ACID mmol/L 3 5* 5 1*           * I Have Reviewed All Lab Data Listed Above  * Additional Pertinent Lab Tests Reviewed:  All Labs Within Last 24 Hours Reviewed    Imaging:    Imaging Reports Reviewed Today Include: XR elbow  Imaging Personally Reviewed by Myself Includes:  none    Recent Cultures (last 7 days):           Last 24 Hours Medication List:     Current Facility-Administered Medications:  acetaminophen 650 mg Oral Q8H PRN Shawn Bone, DO   aspirin 81 mg Oral Daily Anabel Marion, DO   atorvastatin 40 mg Oral HS Anabel Marion, DO   calcium acetate 667 mg Oral TID With Meals Shawn Bone, DO   chlorhexidine 15 mL Swish & Spit Q12H [address] Anabelvalencia HYDE, DO   cholecalciferol 2,000 Units Oral Daily Anabel Marion, DO   cinacalcet 90 mg Oral Daily Anabel Marion, DO   cloNIDine 0 1 mg Oral Q8H [address] Anabel Marion, DO   dextrose 13 mL Intravenous Once Shawn Bone, DO   dextrose 25 g Intravenous PRN Beatriz Torres MD   diphenhydrAMINE (BENADRYL) IVPB 50 mg Intravenous Daily PRN Shawn Bone, DO   famotidine 20 mg Oral HS Beatriz Torres MD   gabapentin 100 mg Oral HS Anabel Marion, DO   gentamicin 1 application Topical Daily Anabel Marion, DO   heparin (porcine) 5,000 Units Subcutaneous Q8H [address],    hydrALAZINE 20 mg Intravenous Q4H PRN Anabel Schultz, DO   hydrALAZINE 50 mg Oral TID Shawn Bone, DO   insulin glargine 14 Units Subcutaneous HS Shawn Bone DO   [START ON 2/27/2020] insulin glargine 8 Units Subcutaneous QAM Mari Mason MD   insulin lispro 2-12 Units Subcutaneous 4x Daily (AC & HS) Mary Oneil PA-C   insulin lispro 4 Units Subcutaneous TID With Meals Mari Mason MD   labetalol 300 mg Oral Q12H [address] Anabel Marion, DO   Labetalol HCl 20 mg Intravenous Q2H PRN Anabel Schultz, DO   lactobacillus acidophilus-bulgaricus 1 packet Oral HS Anabel Marion, DO   lisinopril 40 mg Oral Daily Anabel Marion, DO   NIFEdipine 60 mg Oral BID Christine Aparicio, DO   ondansetron 4 mg Intravenous Q4H PRN Beatriz Torres MD   ondansetron 4 mg Oral Q8H [address] Mari Mason MD   torsemide 100 mg Oral Daily With [employer] DO   valproic acid 750 mg Oral Q12H Pop Goldman MD        Today, Patient Was Seen By: Beatriz Torres MD    ** Please Note: Dictation voice to text software may have been used in the creation of this document   **

## 2020-02-27 NOTE — PROGRESS NOTES
NEPHROLOGY PROGRESS NOTE   Elida Mckeon 37 y o  male MRN: [id number]  Unit/Bed#: [facility] 708-01 Encounter: [id number]      ASSESSMENT/PLAN:  1  ESRD:  On CCPD and follows at [place]  · Current orders: 10 hours, 12 5L total volume, 2 2L exchanges with 1 5L last fill   · EDW 94kg  · Continue 2 5% alt 1 5% with icodextran last fill   · 1 5L UF last night (weight up today but was wearing sneakers)   2  Status epilepticus: neuro on board and felt to be provoked seizure from uncontrolled HTN  3  Anemia of CKD:  HETAL on hold due to possible seizure  · Last hgb 8 6  4  Mineral bone disease of CKD:  Continue calcium acetate with meals, Sensipar and vitamin-D replacement  5  Hypertension:  Blood pressure improving  Nifedipine was increased to bid yesterday so will trend for now   · Currently on clonidine 0 1mg q8h, hydralazine 50mg tid, labetalol 300mg q12h, lisinopril 40mg daily, nifedipine 60mg bid, torsemide 100mg daily   6  DM I:  Glucose improving with adjusting dextrose concentration in peritoneal dialysis and insulin adjustment per Endocrinology      SUBJECTIVE:  Feeling well today, no chest pain or shortness of breath   No issues with PD     OBJECTIVE:  Current Weight: Weight - Scale: 101 kg (223 lb 5 2 oz)(STANDING -- PT DID HAVE SNEAKERS ON )  Vitals:    02/27/20 0743   BP: 160/93   Pulse: 82   Resp: 19   Temp: 98 2 °F (36 8 °C)   SpO2: 98%       Intake/Output Summary (Last 24 hours) at 2/27/2020 1014  Last data filed at 2/27/2020 1304  Gross per 24 hour   Intake 750 ml   Output    Net 750 ml       General:  No acute distress  Skin:  No rash  Eyes:  Sclerae anicteric  ENT:  Moist mucous membranes  Neck:  Trachea midline with no JVD  Chest:  Clear to auscultation bilaterally  CVS:  Regular rate and rhythm  Abdomen:  Soft, nontender, nondistended  Extremities:  Trace ankle edema  Neuro:  Awake and alert  Psych:  Appropriate affect      Medications:  Scheduled Meds:    Current Facility-Administered Medications:  acetaminophen 650 mg Oral Q8H PRN Elayne Chard, DO   aspirin 81 mg Oral Daily Anabel Marion, DO   atorvastatin 40 mg Oral HS Anabel Marion, DO   calcium acetate 667 mg Oral TID With Meals Easton Chard, DO   chlorhexidine 15 mL Swish & Spit Q12H [facility] Anabel ROSS, DO   cholecalciferol 2,000 Units Oral Daily Anabel Marion, DO   cinacalcet 90 mg Oral Daily Anabel Marion, DO   cloNIDine 0 1 mg Oral Q8H [facility] Easton Chard, DO   dextrose 13 mL Intravenous Once Easton Chard, DO   dextrose 25 g Intravenous PRN Orlin Wren MD   diphenhydrAMINE (BENADRYL) IVPB 50 mg Intravenous Daily PRN Easton Chard, DO   famotidine 20 mg Oral HS Orlin Wren MD   gabapentin 100 mg Oral HS Anabel Marion, DO   gentamicin 1 application Topical Daily Anabel Marion, DO   heparin (porcine) 5,000 Units Subcutaneous Q8H [address], DO   hydrALAZINE 20 mg Intravenous Q4H PRN Anabel Marion, DO   hydrALAZINE 50 mg Oral TID Anabel Marion, DO   insulin glargine 14 Units Subcutaneous HS Easton Chard, DO   insulin glargine 8 Units Subcutaneous QAM Maria Luisa Fan MD   insulin lispro 2-12 Units Subcutaneous 4x Daily (AC & HS) Italo Cisneros PA-C   insulin lispro 4 Units Subcutaneous TID With Meals Maria Luisa Fan MD   labetalol 300 mg Oral Q12H [facility] Anabel Marion, DO   Labetalol HCl 20 mg Intravenous Q2H PRN Anabel Marion, DO   lactobacillus acidophilus-bulgaricus 1 packet Oral HS Anabel Marion, DO   lisinopril 40 mg Oral Daily Anabel Marion, DO   NIFEdipine 60 mg Oral BID Wayne Aparicio, DO   ondansetron 4 mg Intravenous Q4H PRN Orlin Wren MD   ondansetron 4 mg Oral Q8H [facility] Maria Luisa Fan MD   torsemide 100 mg Oral Daily With [employer], DO   valproic acid 750 mg Oral Q12H Helga Macdonald MD       PRN Meds:   acetaminophen    dextrose    diphenhydrAMINE (BENADRYL) IVPB    hydrALAZINE    Labetalol HCl    ondansetron    Laboratory Results:  Results from last 7 days   Lab Units 02/26/20  0500 02/25/20  0759 02/24/20  0601 02/23/20  0437 02/22/20  0501 02/21/20  1150 02/21/20  0945 02/21/20  0850 02/21/20  0429 02/20/20  1804   WBC Thousand/uL  --  8 70 9 90 10 91* 12 32* 8 65 7 82  --  9 21 7 85   HEMOGLOBIN g/dL  --  8 6* 9 1* 9 5* 9 7* 9 2* 9 8*  --  11 1* 10 4*   I STAT HEMOGLOBIN g/dl  --   --   --   --   --   --   --  10 2*  --   --    HEMATOCRIT %  --  25 4* 28 1* 29 2* 30 1* 27 2* 29 0*  --  34 1* 32 6*   HEMATOCRIT, ISTAT %  --   --   --   --   --   --   --  30*  --   --    PLATELETS Thousands/uL  --  249 242 254 249 253 261  --  271 235   SODIUM mmol/L 134* 134* 142 141 139 137 142  --  143 143   POTASSIUM mmol/L 3 8 4 1 3 7 3 7 4 9 4 8 5 4*  --  4 6 5 6*   CHLORIDE mmol/L 98* 99* 105 103 100 100 100  --  101 101   CO2 mmol/L 22 22 23 24 25 24 24  --  27 29   CO2, I-STAT mmol/L  --   --   --   --   --   --   --  24  --   --    BUN mg/dL 50* 47* 47* 52* 54* 50* 50*  --  52* 53*   CREATININE mg/dL 14 50* 14 50* 15 40* 15 40* 16 20* 15 20* 15 82*  --  15 44* 15 32*   CALCIUM mg/dL 8 9 8 8 9 7 9 2 8 6 8 9 9 0  --  9 0 8 7   MAGNESIUM mg/dL  --   --   --   --   --  2 8* 2 8*  --   --  2 5   PHOSPHORUS mg/dL  --   --  8 8*  --   --   --   --   --   --   --    GLUCOSE, ISTAT mg/dl  --   --   --   --   --   --   --  275*  --   --

## 2020-02-27 NOTE — ASSESSMENT & PLAN NOTE
GI consulted, appreciate input  They have signed off  Continue erythromycin and famotidine, remains with symptoms of dyspepsia and bloating  Tolerating diet  Zofran p r n    Outpatient GI follow-up

## 2020-02-27 NOTE — SOCIAL WORK
S/w SLIM for care coordination & pt for possible dc either today or tomorrow  Ecin message sent to [facility]  They are requesting updated therapy notes   Communicated with PT & OT

## 2020-02-27 NOTE — RESTORATIVE TECHNICIAN NOTE
Restorative Specialist Mobility Note       Activity: Ambulate in gonzalez, Ambulate in room, Bathroom privileges, Chair, Dangle, Stand at bedside(Educated/encouraged pt to ambulate with assistance 3-4 x's/day  Chair alarm on  Pt callbell, phone/tray within reach )     Assistive Device: Front wheel walker, Other (Comment)(Assist x1 with chair follow   Assisted PT Glenda Chakraborty )       Kee YIN, Restorative Technician, [employer]

## 2020-02-27 NOTE — ASSESSMENT & PLAN NOTE
· Intubated then extubated 2/23  · Neurology consulted, appreciate input  · S/p EEG monitoring- no epileptiform activity seen as per Neurology  · As per Neurology thought to be due to uncontrolled hypertension  · Continue Depacon  · Outpatient Neurology follow-up  · Resolved

## 2020-02-27 NOTE — PLAN OF CARE
Problem: PHYSICAL THERAPY ADULT  Goal: Performs mobility at highest level of function for planned discharge setting  See evaluation for individualized goals  Description  Treatment/Interventions: LE strengthening/ROM, Functional transfer training, Therapeutic exercise, Patient/family training, Endurance training, Gait training, Bed mobility, OT, Spoke to case management, Spoke to nursing, Equipment eval/education          See flowsheet documentation for full assessment, interventions and recommendations  Note:   Prognosis: Good  Problem List: Decreased strength, Decreased endurance, Impaired balance, Decreased mobility, Decreased cognition, Impaired judgement, Decreased safety awareness  Assessment: Patient seen for physical therapy session with a focus on there-ex, balance training, gait training, activity tolerance, and education  Pt received seated OOB and agreeable to PT session  Pt motivated to participate in therapy today  Patient made improvement in ambulation distances today and requires less assistance with all functional mobility  Trial ambulation without RW, requires mod A x 1 to maintain ambulatory balance, occasionally reaches for handrail for increased stabilization  Decreased ambulation distance tolerance without use of RW  1-2 minor lateral LOB, requires min A x 1 to recover LOB  Pt continues to function below his baseline as he is normally able to ambulate household distances without DME  Tolerated standing there-ex well but requires min A x 1 to maintain standing balance  Recommend STR upon discharge once medically stable  Barriers to Discharge: Decreased caregiver support     Recommendation: Post acute IP rehab     PT - OK to Discharge: Yes    See flowsheet documentation for full assessment

## 2020-02-27 NOTE — ASSESSMENT & PLAN NOTE
Uncontrolled  Management as per nephrology, appreciate input  Continue clonidine, hydralazine, Procardia

## 2020-02-27 NOTE — PROGRESS NOTES
Endocrinology Progress Note   Unit/Bed#: [facility] 708-01 Encounter: [id number]        Garland Mckeon 37 y o  male [id number]    Hospital Stay Days: 6    Assessment:  76-year-old male with multi-system complications secondary to type 1 diabetes with a new onset seizure with known history of strokes       Plan:     1  Type 1 diabetes with microvascular and macrovascular complications  In last 24 hours, blood glucose was ranging between 80 at bedtime and 200 during peritoneal dialysis overnight  He was alternating between 1 25% and 2 5% dextrose bags for peritoneal dialysis  BG have been controlled over the last 24 hours  · Stopped am Lantus and 14 units at night   · Continue Humalog 4 units TID with meals with ISS Algorithm #1  · Continue diabetic diet- and reinforced small more frequent meals       Subjective:   Patient seen and examined  Patient tolerating diet ok today minimal nausea  BG got down to 79 and feeling shaking however ate all his lunch  Vitals: Temp (24hrs), Av 3 °F (36 8 °C), Min:98 2 °F (36 8 °C), Max:98 4 °F (36 9 °C)  Current: Temperature: 98 2 °F (36 8 °C)  Vitals:    20 1501 20 2148 20 0600 20 0743   BP: 135/77 156/94  160/93   Pulse: 68 81  82   Resp:    19   Temp: 98 3 °F (36 8 °C) 98 4 °F (36 9 °C)  98 2 °F (36 8 °C)   TempSrc:       SpO2: 99% 98%  98%   Weight:   101 kg (223 lb 5 2 oz)    Height:        Body mass index is 31 15 kg/m²  I/O last 24 hours: In: 18 [P O :990]  Out: -       Physical Exam: /91   Pulse 86   Temp 98 2 °F (36 8 °C)   Resp 19   Ht 5' 11" (1 803 m)   Wt 100 kg (220 lb 7 4 oz)   SpO2 98%   BMI 30 75 kg/m²   General appearance: alert and oriented, in no acute distress  Head: Normocephalic, without obvious abnormality, atraumatic   Abdomen: PD, Sensory in place     Lungs: clear to auscultation bilaterally  Heart: regular rate and rhythm, S1, S2 normal, no murmur, click, rub or gallop  Extremities: extremities normal, warm and well-perfused; no cyanosis, clubbing, or edema  Pulses: 2+ and symmetric     Invasive Devices     Peripheral Intravenous Line            Peripheral IV 02/26/20 Right Forearm less than 1 day                          Labs:   Recent Results (from the past 24 hour(s))   Fingerstick Glucose (POCT)    Collection Time: 02/26/20 11:03 AM   Result Value Ref Range    POC Glucose 112 65 - 140 mg/dl   Fingerstick Glucose (POCT)    Collection Time: 02/26/20  1:29 PM   Result Value Ref Range    POC Glucose 53 (L) 65 - 140 mg/dl   Fingerstick Glucose (POCT)    Collection Time: 02/26/20  1:48 PM   Result Value Ref Range    POC Glucose 63 (L) 65 - 140 mg/dl   Fingerstick Glucose (POCT)    Collection Time: 02/26/20  3:58 PM   Result Value Ref Range    POC Glucose 95 65 - 140 mg/dl   Fingerstick Glucose (POCT)    Collection Time: 02/26/20  9:10 PM   Result Value Ref Range    POC Glucose 80 65 - 140 mg/dl   Fingerstick Glucose (POCT)    Collection Time: 02/27/20  2:53 AM   Result Value Ref Range    POC Glucose 236 (H) 65 - 140 mg/dl   Fingerstick Glucose (POCT)    Collection Time: 02/27/20  6:46 AM   Result Value Ref Range    POC Glucose 209 (H) 65 - 140 mg/dl       Radiology Results: I have personally reviewed pertinent reports  Other Diagnostic Testing:   I have personally reviewed pertinent reports          Active Meds:   Current Facility-Administered Medications   Medication Dose Route Frequency    acetaminophen (TYLENOL) tablet 650 mg  650 mg Oral Q8H PRN    aspirin chewable tablet 81 mg  81 mg Oral Daily    atorvastatin (LIPITOR) tablet 40 mg  40 mg Oral HS    calcium acetate (PHOSLO) capsule 667 mg  667 mg Oral TID With Meals    chlorhexidine (PERIDEX) 0 12 % oral rinse 15 mL  15 mL Swish & Spit Q12H [address]    cholecalciferol (VITAMIN D3) tablet 2,000 Units  2,000 Units Oral Daily    cinacalcet (SENSIPAR) tablet 90 mg  90 mg Oral Daily    cloNIDine (CATAPRES) tablet 0 1 mg  0 1 mg Oral Q8H [address]    dextrose 50 % IV solution 13 mL  13 mL Intravenous Once    dextrose 50 % IV solution 25 g  25 g Intravenous PRN    diphenhydrAMINE (BENADRYL) 50 mg in sodium chloride 0 9 % 50 mL IVPB  50 mg Intravenous Daily PRN    famotidine (PEPCID) tablet 20 mg  20 mg Oral HS    gabapentin (NEURONTIN) capsule 100 mg  100 mg Oral HS    gentamicin (GARAMYCIN) 0 1 % ointment 1 application  1 application Topical Daily    heparin (porcine) subcutaneous injection 5,000 Units  5,000 Units Subcutaneous Q8H [address]    hydrALAZINE (APRESOLINE) injection 20 mg  20 mg Intravenous Q4H PRN    hydrALAZINE (APRESOLINE) tablet 50 mg  50 mg Oral TID    insulin glargine (LANTUS) subcutaneous injection 14 Units 0 14 mL  14 Units Subcutaneous HS    insulin glargine (LANTUS) subcutaneous injection 8 Units 0 08 mL  8 Units Subcutaneous QAM    insulin lispro (HumaLOG) 100 units/mL subcutaneous injection 2-12 Units  2-12 Units Subcutaneous 4x Daily (AC & HS)    insulin lispro (HumaLOG) 100 units/mL subcutaneous injection 4 Units  4 Units Subcutaneous TID With Meals    labetalol (NORMODYNE) tablet 300 mg  300 mg Oral Q12H HALIE    Labetalol HCl (NORMODYNE) injection 20 mg  20 mg Intravenous Q2H PRN    lactobacillus acidophilus-bulgaricus (FLORANEX) packet 1 packet  1 packet Oral HS    lisinopril (ZESTRIL) tablet 40 mg  40 mg Oral Daily    NIFEdipine (PROCARDIA XL) 24 hr tablet 60 mg  60 mg Oral BID    ondansetron (ZOFRAN) injection 4 mg  4 mg Intravenous Q4H PRN    ondansetron (ZOFRAN-ODT) dispersible tablet 4 mg  4 mg Oral Q8H [address]    torsemide (DEMADEX) tablet 100 mg  100 mg Oral Daily With Lunch    valproic acid (DEPAKENE) capsule 750 mg  750 mg Oral Q12H Brooke Lancaster MD

## 2020-02-27 NOTE — ASSESSMENT & PLAN NOTE
Lab Results   Component Value Date    HGBA1C 5 6 02/21/2020       Recent Labs     02/26/20  1103 02/26/20  1329 02/26/20  1348 02/26/20  1558   POCGLU 112 53* 63* 95       Blood Sugar Average: Last 72 hrs:  (P) 726 [id number]     With episodes of hypo and hyperglycemia this hospitalization  Patient now with lower dextrose diasylate  Some hypoglycemia today, endocrine has titrated insulin- appreciate input  Continue Lantus 8 units in the morning and 14 units at night, Humalog 4 units with meals, SSI, Accu-Cheks

## 2020-02-27 NOTE — PLAN OF CARE
Problem: OCCUPATIONAL THERAPY ADULT  Goal: Performs self-care activities at highest level of function for planned discharge setting  See evaluation for individualized goals  Description  Treatment Interventions: ADL retraining, Functional transfer training, UE strengthening/ROM, Endurance training, Cognitive reorientation, Patient/family training, Equipment evaluation/education, Compensatory technique education, Continued evaluation, Energy conservation, Activityengagement          See flowsheet documentation for full assessment, interventions and recommendations  Outcome: Progressing  Note:   Limitation: Decreased ADL status, Decreased Safe judgement during ADL, Decreased endurance, Decreased self-care trans, Decreased high-level ADLs, Decreased sensation  Prognosis: Fair  Assessment: Pt participated in occupational therapy with focus on activity tolerance, functional transfers/standing balance and tolerance for pt engagement in functional self-care task/LB self-care and pt L B self-care  Pt cleared by RN/for pt participation in occupational therapy  Pt received sitting out of bed to bedside chair and agreeable to therapy  Pt continues to require assist for functional transfers 2* pt decreased overall strength  Pt required assist for LB self-care dressing 2* pt coordination and pt with limited activity tolerance  Pt will require in-pt rehab to continue to address these above noted deficits which currently impair pt ADL and functional mob        OT Discharge Recommendation: Short Term Rehab  OT - OK to Discharge: Yes(when medically stable)

## 2020-02-27 NOTE — PHYSICAL THERAPY NOTE
Physical Therapy Treatment Note  Patient Name: Rolanda Tolentino    JLFAR'U Date: 2/27/2020     Problem List  Principal Problem:    Status epilepticus ([facility] )  Active Problems:    Type 1 diabetes mellitus with hypoglycemia ([facility] )    History of lacunar cerebrovascular accident (CVA)    Homozygous MTHFR mutation C677T ([facility] )    End-stage renal disease on peritoneal dialysis ([facility] )    Renovascular hypertension    Dyslipidemia    Diabetic gastroparesis ([facility] )       Past Medical History  Past Medical History:   Diagnosis Date    Acute kidney injury ([facility] )     Anxiety     Cerebellar stroke side undetermined 2015 2015,1/2018    Diabetes type 1, controlled ([place] [address] )     IDDM    Diarrhea     Gastroparesis     History of shingles 2010    History of transfusion 02/2018    Hypertension     Muscle weakness     general unsteadiness    Retinopathy         Past Surgical History  Past Surgical History:   Procedure Laterality Date    CARDIAC LOOP RECORDER  05/2018    EGD      EYE SURGERY      PERITONEAL CATHETER INSERTION N/A 8/27/2018    Procedure: UNROOF PD CATHETER;  Surgeon: Poonam Carrero DO;  [facility];  Service: General    MN ESOPHAGOGASTRODUODENOSCOPY TRANSORAL DIAGNOSTIC N/A 4/18/2019    Procedure: ESOPHAGOGASTRODUODENOSCOPY (EGD); Surgeon: Kirit Sarkar MD;  Location: [facility]; Service: Gastroenterology    MN LAP INSERTION TUNNELED INTRAPERITONEAL CATHETER N/A 8/6/2018    Procedure: LAPAROSCOPIC PD CATHETER PLACEMENT;  Surgeon: Poonam Carrero DO;  [facility]: [facility];  Service: General    TONSILLECTOMY        02/27/20 1039   Pain Assessment   Pain Assessment 0-10   Pain Score 5   Pain Type Acute pain   Pain Location Arm   Pain Orientation Left   Restrictions/Precautions   Weight Bearing Precautions Per Order No   Other Precautions Bed Alarm; Chair Alarm; Fall Risk;Pain;Telemetry;Multiple lines   General   Chart Reviewed Yes   Family/Caregiver Present Yes   Cognition   Arousal/Participation Cooperative   Attention Attends with cues to redirect   Orientation Level Oriented X4   Memory Decreased recall of precautions   Following Commands Follows one step commands without difficulty   Comments Pt is motivated to participate in therapy, requires occasional cues for safety  Subjective   Subjective Reports he has pain in his L elbow when moves it   (X-ray negative for fracture)   Bed Mobility   Additional Comments Not tested  patient seated OOB upon arrival   Transfers   Sit to Stand 4  Minimal assistance   Additional items Assist x 1; Increased time required;Verbal cues   Stand to Sit 4  Minimal assistance   Additional items Assist x 1; Increased time required;Verbal cues   Additional Comments Increased time to complete, requires assistance for minimal force production into standing  Ambulation/Elevation   Gait pattern Ataxia; Short stride; Step to; Foward flexed  (Decreased bilteral heel strike, lateral sway )   Gait Assistance 4  Minimal assist  (min A with RW/ mod A without DME; + chair follow)   Additional items Assist x 1;Verbal cues   Assistive Device Rolling walker  (10 feet x 2 without DME, occasional reaches for handrail)   Distance 80 feet x 4 (w/RW & standing rest break); 10 feet x 2 without DME    Balance   Static Sitting Fair   Dynamic Sitting Fair   Static Standing Poor +   Dynamic Standing Poor +   Ambulatory Poor  (without DME)   Endurance Deficit   Endurance Deficit Yes   Endurance Deficit Description fatigue    Activity Tolerance   Activity Tolerance Patient limited by fatigue   Medical Staff Made Aware Restorative Staff   Nurse Made Aware Yes, Per RN patient is appropriate for PT evaluation   Exercises   Hip Abduction Standing;10 reps;AROM; Right;Left  (x10 each)   Marching Standing;10 reps;AROM; Bilateral  (at handrail, min A x 1 to maintain balance)   Assessment   Prognosis Good   Problem List Decreased strength;Decreased endurance; Impaired balance;Decreased mobility; Decreased cognition; Impaired judgement;Decreased safety awareness   Assessment Patient seen for physical therapy session with a focus on there-ex, balance training, gait training, activity tolerance, and education  Pt received seated OOB and agreeable to PT session  Pt motivated to participate in therapy today  Patient made improvement in ambulation distances today and requires less assistance with all functional mobility  Trial ambulation without RW, requires mod A x 1 to maintain ambulatory balance, occasionally reaches for handrail for increased stabilization  Decreased ambulation distance tolerance without use of RW  1-2 minor lateral LOB, requires min A x 1 to recover LOB  Pt continues to function below his baseline as he is normally able to ambulate household distances without DME  Tolerated standing there-ex well but requires min A x 1 to maintain standing balance  Recommend STR upon discharge once medically stable  Barriers to Discharge Decreased caregiver support   Goals   Patient Goals To walk better   [facility] Expiration Date 03/07/20   PT Treatment Day 3   Plan   Treatment/Interventions Functional transfer training;LE strengthening/ROM; Therapeutic exercise; Endurance training;Patient/family training;Gait training;Bed mobility   PT Frequency Other (Comment)  (3-5x/wk)   Recommendation   Recommendation Post acute IP rehab   Equipment Recommended Walker   PT - OK to Discharge Yes       Elisha Palacio PT, DPT

## 2020-02-28 ENCOUNTER — HOSPITAL ENCOUNTER (INPATIENT)
Facility: HOSPITAL | Age: 35
LOS: 4 days | Discharge: HOME | DRG: 101 | End: 2020-03-03
Attending: PHYSICAL MEDICINE & REHABILITATION | Admitting: PHYSICAL MEDICINE & REHABILITATION
Payer: MEDICARE

## 2020-02-28 VITALS
WEIGHT: 221.34 LBS | RESPIRATION RATE: 18 BRPM | TEMPERATURE: 98.6 F | SYSTOLIC BLOOD PRESSURE: 161 MMHG | DIASTOLIC BLOOD PRESSURE: 95 MMHG | BODY MASS INDEX: 30.99 KG/M2 | HEIGHT: 71 IN | OXYGEN SATURATION: 97 % | HEART RATE: 82 BPM

## 2020-02-28 DIAGNOSIS — I15.0 RENOVASCULAR HYPERTENSION: Primary | ICD-10-CM

## 2020-02-28 DIAGNOSIS — I10 ACCELERATED HYPERTENSION: ICD-10-CM

## 2020-02-28 DIAGNOSIS — K31.84 DIABETIC GASTROPARESIS (HCC): ICD-10-CM

## 2020-02-28 DIAGNOSIS — Z78.9 IMPAIRED MOBILITY AND ADLS: ICD-10-CM

## 2020-02-28 DIAGNOSIS — R56.9 SEIZURES (HCC): ICD-10-CM

## 2020-02-28 DIAGNOSIS — E10.649 TYPE 1 DIABETES MELLITUS WITH HYPOGLYCEMIA (HCC): Chronic | ICD-10-CM

## 2020-02-28 DIAGNOSIS — Z99.2 PERITONEAL DIALYSIS CATHETER IN PLACE (HCC): ICD-10-CM

## 2020-02-28 DIAGNOSIS — E11.43 DIABETIC GASTROPARESIS (HCC): ICD-10-CM

## 2020-02-28 DIAGNOSIS — Z74.09 IMPAIRED MOBILITY AND ADLS: ICD-10-CM

## 2020-02-28 PROBLEM — G40.901 STATUS EPILEPTICUS (HCC): Status: RESOLVED | Noted: 2020-02-21 | Resolved: 2020-02-28

## 2020-02-28 LAB
GLUCOSE SERPL-MCNC: 126 MG/DL (ref 65–140)
GLUCOSE SERPL-MCNC: 133 MG/DL (ref 65–140)
GLUCOSE SERPL-MCNC: 139 MG/DL (ref 65–140)
GLUCOSE SERPL-MCNC: 162 MG/DL (ref 65–140)
GLUCOSE SERPL-MCNC: 177 MG/DL (ref 65–140)

## 2020-02-28 PROCEDURE — 82948 REAGENT STRIP/BLOOD GLUCOSE: CPT

## 2020-02-28 PROCEDURE — 99232 SBSQ HOSP IP/OBS MODERATE 35: CPT | Performed by: INTERNAL MEDICINE

## 2020-02-28 PROCEDURE — NC001 PR NO CHARGE

## 2020-02-28 PROCEDURE — 99239 HOSP IP/OBS DSCHRG MGMT >30: CPT | Performed by: INTERNAL MEDICINE

## 2020-02-28 PROCEDURE — 99223 1ST HOSP IP/OBS HIGH 75: CPT | Performed by: PHYSICAL MEDICINE & REHABILITATION

## 2020-02-28 RX ORDER — INSULIN GLARGINE 100 [IU]/ML
14 INJECTION, SOLUTION SUBCUTANEOUS
Status: CANCELLED | OUTPATIENT
Start: 2020-02-28

## 2020-02-28 RX ORDER — ATORVASTATIN CALCIUM 40 MG/1
40 TABLET, FILM COATED ORAL
Status: CANCELLED | OUTPATIENT
Start: 2020-02-28

## 2020-02-28 RX ORDER — CHLORHEXIDINE GLUCONATE 0.12 MG/ML
15 RINSE ORAL EVERY 12 HOURS SCHEDULED
Status: DISCONTINUED | OUTPATIENT
Start: 2020-02-28 | End: 2020-03-03 | Stop reason: HOSPADM

## 2020-02-28 RX ORDER — ONDANSETRON 4 MG/1
4 TABLET, ORALLY DISINTEGRATING ORAL EVERY 8 HOURS SCHEDULED
Status: DISCONTINUED | OUTPATIENT
Start: 2020-02-28 | End: 2020-03-03 | Stop reason: HOSPADM

## 2020-02-28 RX ORDER — VALPROIC ACID 250 MG/1
750 CAPSULE, LIQUID FILLED ORAL EVERY 12 HOURS SCHEDULED
Status: CANCELLED | OUTPATIENT
Start: 2020-02-28

## 2020-02-28 RX ORDER — LACTOBACILLUS ACIDOPHILUS / LACTOBACILLUS BULGARICUS 100 MILLION CFU STRENGTH
1 GRANULES ORAL
Status: DISCONTINUED | OUTPATIENT
Start: 2020-02-28 | End: 2020-03-03 | Stop reason: HOSPADM

## 2020-02-28 RX ORDER — ACETAMINOPHEN 325 MG/1
650 TABLET ORAL EVERY 8 HOURS PRN
Status: CANCELLED | OUTPATIENT
Start: 2020-02-28

## 2020-02-28 RX ORDER — MELATONIN
2000 DAILY
Status: CANCELLED | OUTPATIENT
Start: 2020-02-29

## 2020-02-28 RX ORDER — LABETALOL 20 MG/4 ML (5 MG/ML) INTRAVENOUS SYRINGE
20 EVERY 2 HOUR PRN
Status: CANCELLED | OUTPATIENT
Start: 2020-02-28

## 2020-02-28 RX ORDER — LACTOBACILLUS ACIDOPHILUS / LACTOBACILLUS BULGARICUS 100 MILLION CFU STRENGTH
1 GRANULES ORAL
Status: CANCELLED | OUTPATIENT
Start: 2020-02-28

## 2020-02-28 RX ORDER — ONDANSETRON 2 MG/ML
4 INJECTION INTRAMUSCULAR; INTRAVENOUS EVERY 4 HOURS PRN
Status: CANCELLED | OUTPATIENT
Start: 2020-02-28

## 2020-02-28 RX ORDER — DEXTROSE MONOHYDRATE 25 G/50ML
25 INJECTION, SOLUTION INTRAVENOUS AS NEEDED
Status: DISCONTINUED | OUTPATIENT
Start: 2020-02-28 | End: 2020-03-03 | Stop reason: HOSPADM

## 2020-02-28 RX ORDER — INSULIN GLARGINE 100 [IU]/ML
14 INJECTION, SOLUTION SUBCUTANEOUS
Status: DISCONTINUED | OUTPATIENT
Start: 2020-02-28 | End: 2020-03-01

## 2020-02-28 RX ORDER — HYDRALAZINE HYDROCHLORIDE 20 MG/ML
20 INJECTION INTRAMUSCULAR; INTRAVENOUS EVERY 4 HOURS PRN
Status: DISCONTINUED | OUTPATIENT
Start: 2020-02-28 | End: 2020-03-03

## 2020-02-28 RX ORDER — CLONIDINE HYDROCHLORIDE 0.1 MG/1
0.1 TABLET ORAL EVERY 8 HOURS SCHEDULED
Status: DISCONTINUED | OUTPATIENT
Start: 2020-02-28 | End: 2020-03-03

## 2020-02-28 RX ORDER — VALPROIC ACID 250 MG/1
750 CAPSULE, LIQUID FILLED ORAL EVERY 12 HOURS SCHEDULED
Status: DISCONTINUED | OUTPATIENT
Start: 2020-02-28 | End: 2020-03-03 | Stop reason: HOSPADM

## 2020-02-28 RX ORDER — ASPIRIN 81 MG/1
81 TABLET, CHEWABLE ORAL DAILY
Status: DISCONTINUED | OUTPATIENT
Start: 2020-02-29 | End: 2020-03-03 | Stop reason: HOSPADM

## 2020-02-28 RX ORDER — CHLORHEXIDINE GLUCONATE 0.12 MG/ML
15 RINSE ORAL EVERY 12 HOURS SCHEDULED
Status: CANCELLED | OUTPATIENT
Start: 2020-02-28

## 2020-02-28 RX ORDER — LISINOPRIL 20 MG/1
40 TABLET ORAL DAILY
Status: CANCELLED | OUTPATIENT
Start: 2020-02-29

## 2020-02-28 RX ORDER — ONDANSETRON 4 MG/1
4 TABLET, ORALLY DISINTEGRATING ORAL EVERY 8 HOURS SCHEDULED
Status: CANCELLED | OUTPATIENT
Start: 2020-02-28

## 2020-02-28 RX ORDER — NIFEDIPINE 60 MG/1
60 TABLET, EXTENDED RELEASE ORAL 2 TIMES DAILY
Status: DISCONTINUED | OUTPATIENT
Start: 2020-02-28 | End: 2020-03-03 | Stop reason: HOSPADM

## 2020-02-28 RX ORDER — PANTOPRAZOLE SODIUM 40 MG/1
40 TABLET, DELAYED RELEASE ORAL
Status: CANCELLED | OUTPATIENT
Start: 2020-02-29

## 2020-02-28 RX ORDER — HYDRALAZINE HYDROCHLORIDE 20 MG/ML
20 INJECTION INTRAMUSCULAR; INTRAVENOUS EVERY 4 HOURS PRN
Status: CANCELLED | OUTPATIENT
Start: 2020-02-28

## 2020-02-28 RX ORDER — CINACALCET 30 MG/1
90 TABLET, FILM COATED ORAL DAILY
Status: CANCELLED | OUTPATIENT
Start: 2020-02-29

## 2020-02-28 RX ORDER — LABETALOL 20 MG/4 ML (5 MG/ML) INTRAVENOUS SYRINGE
20 EVERY 2 HOUR PRN
Status: DISCONTINUED | OUTPATIENT
Start: 2020-02-28 | End: 2020-03-03

## 2020-02-28 RX ORDER — GENTAMICIN SULFATE 1 MG/G
1 OINTMENT TOPICAL DAILY
Status: CANCELLED | OUTPATIENT
Start: 2020-02-28

## 2020-02-28 RX ORDER — PANTOPRAZOLE SODIUM 40 MG/1
40 TABLET, DELAYED RELEASE ORAL
Status: DISCONTINUED | OUTPATIENT
Start: 2020-02-29 | End: 2020-03-03 | Stop reason: HOSPADM

## 2020-02-28 RX ORDER — ACETAMINOPHEN 325 MG/1
650 TABLET ORAL EVERY 8 HOURS PRN
Status: DISCONTINUED | OUTPATIENT
Start: 2020-02-28 | End: 2020-03-03 | Stop reason: HOSPADM

## 2020-02-28 RX ORDER — HEPARIN SODIUM 5000 [USP'U]/ML
5000 INJECTION, SOLUTION INTRAVENOUS; SUBCUTANEOUS EVERY 8 HOURS SCHEDULED
Status: CANCELLED | OUTPATIENT
Start: 2020-02-28

## 2020-02-28 RX ORDER — NIFEDIPINE 60 MG/1
60 TABLET, EXTENDED RELEASE ORAL 2 TIMES DAILY
Status: CANCELLED | OUTPATIENT
Start: 2020-02-28

## 2020-02-28 RX ORDER — ASPIRIN 81 MG/1
81 TABLET, CHEWABLE ORAL DAILY
Status: CANCELLED | OUTPATIENT
Start: 2020-02-29

## 2020-02-28 RX ORDER — TORSEMIDE 20 MG/1
100 TABLET ORAL
Status: CANCELLED | OUTPATIENT
Start: 2020-02-29

## 2020-02-28 RX ORDER — GENTAMICIN SULFATE 1 MG/G
1 OINTMENT TOPICAL DAILY
Status: DISCONTINUED | OUTPATIENT
Start: 2020-02-28 | End: 2020-03-03 | Stop reason: HOSPADM

## 2020-02-28 RX ORDER — DOXAZOSIN 2 MG/1
2 TABLET ORAL
Status: DISCONTINUED | OUTPATIENT
Start: 2020-02-28 | End: 2020-03-03

## 2020-02-28 RX ORDER — DEXTROSE MONOHYDRATE 25 G/50ML
25 INJECTION, SOLUTION INTRAVENOUS AS NEEDED
Status: CANCELLED | OUTPATIENT
Start: 2020-02-28

## 2020-02-28 RX ORDER — DOXAZOSIN 2 MG/1
2 TABLET ORAL
Status: CANCELLED | OUTPATIENT
Start: 2020-02-28

## 2020-02-28 RX ORDER — ONDANSETRON 2 MG/ML
4 INJECTION INTRAMUSCULAR; INTRAVENOUS EVERY 4 HOURS PRN
Status: DISCONTINUED | OUTPATIENT
Start: 2020-02-28 | End: 2020-03-03

## 2020-02-28 RX ORDER — GABAPENTIN 100 MG/1
100 CAPSULE ORAL
Status: CANCELLED | OUTPATIENT
Start: 2020-02-28

## 2020-02-28 RX ORDER — LISINOPRIL 20 MG/1
40 TABLET ORAL DAILY
Status: DISCONTINUED | OUTPATIENT
Start: 2020-02-29 | End: 2020-03-03 | Stop reason: HOSPADM

## 2020-02-28 RX ORDER — GABAPENTIN 100 MG/1
100 CAPSULE ORAL
Status: DISCONTINUED | OUTPATIENT
Start: 2020-02-28 | End: 2020-03-03 | Stop reason: HOSPADM

## 2020-02-28 RX ORDER — MELATONIN
2000 DAILY
Status: DISCONTINUED | OUTPATIENT
Start: 2020-02-29 | End: 2020-03-03 | Stop reason: HOSPADM

## 2020-02-28 RX ORDER — TORSEMIDE 20 MG/1
100 TABLET ORAL
Status: DISCONTINUED | OUTPATIENT
Start: 2020-02-29 | End: 2020-03-03 | Stop reason: HOSPADM

## 2020-02-28 RX ORDER — HEPARIN SODIUM 5000 [USP'U]/ML
5000 INJECTION, SOLUTION INTRAVENOUS; SUBCUTANEOUS EVERY 8 HOURS SCHEDULED
Status: DISCONTINUED | OUTPATIENT
Start: 2020-02-28 | End: 2020-03-03 | Stop reason: HOSPADM

## 2020-02-28 RX ORDER — CINACALCET 30 MG/1
90 TABLET, FILM COATED ORAL DAILY
Status: DISCONTINUED | OUTPATIENT
Start: 2020-02-29 | End: 2020-03-03 | Stop reason: HOSPADM

## 2020-02-28 RX ORDER — HYDRALAZINE HYDROCHLORIDE 50 MG/1
50 TABLET, FILM COATED ORAL 3 TIMES DAILY
Status: CANCELLED | OUTPATIENT
Start: 2020-02-28

## 2020-02-28 RX ORDER — ATORVASTATIN CALCIUM 40 MG/1
40 TABLET, FILM COATED ORAL
Status: DISCONTINUED | OUTPATIENT
Start: 2020-02-28 | End: 2020-03-03 | Stop reason: HOSPADM

## 2020-02-28 RX ORDER — HYDRALAZINE HYDROCHLORIDE 50 MG/1
50 TABLET, FILM COATED ORAL 3 TIMES DAILY
Status: DISCONTINUED | OUTPATIENT
Start: 2020-02-28 | End: 2020-03-03 | Stop reason: HOSPADM

## 2020-02-28 RX ORDER — CLONIDINE HYDROCHLORIDE 0.1 MG/1
0.1 TABLET ORAL EVERY 8 HOURS SCHEDULED
Status: CANCELLED | OUTPATIENT
Start: 2020-02-28

## 2020-02-28 RX ADMIN — GENTAMICIN SULFATE 1 APPLICATION: 1 OINTMENT TOPICAL at 21:26

## 2020-02-28 RX ADMIN — DOXAZOSIN 2 MG: 2 TABLET ORAL at 21:22

## 2020-02-28 RX ADMIN — CLONIDINE HYDROCHLORIDE 0.1 MG: 0.1 TABLET ORAL at 21:21

## 2020-02-28 RX ADMIN — CALCIUM ACETATE 667 MG: 667 CAPSULE ORAL at 13:02

## 2020-02-28 RX ADMIN — PANTOPRAZOLE SODIUM 40 MG: 40 TABLET, DELAYED RELEASE ORAL at 06:53

## 2020-02-28 RX ADMIN — CINACALCET HYDROCHLORIDE 90 MG: 30 TABLET, FILM COATED ORAL at 08:50

## 2020-02-28 RX ADMIN — INSULIN LISPRO 1 UNITS: 100 INJECTION, SOLUTION INTRAVENOUS; SUBCUTANEOUS at 12:10

## 2020-02-28 RX ADMIN — ONDANSETRON 4 MG: 4 TABLET, ORALLY DISINTEGRATING ORAL at 21:24

## 2020-02-28 RX ADMIN — ASPIRIN 81 MG 81 MG: 81 TABLET ORAL at 08:51

## 2020-02-28 RX ADMIN — CHLORHEXIDINE GLUCONATE 0.12% ORAL RINSE 15 ML: 1.2 LIQUID ORAL at 08:51

## 2020-02-28 RX ADMIN — HEPARIN SODIUM 5000 UNITS: 5000 INJECTION INTRAVENOUS; SUBCUTANEOUS at 06:53

## 2020-02-28 RX ADMIN — INSULIN LISPRO 4 UNITS: 100 INJECTION, SOLUTION INTRAVENOUS; SUBCUTANEOUS at 17:40

## 2020-02-28 RX ADMIN — ONDANSETRON 4 MG: 2 INJECTION INTRAMUSCULAR; INTRAVENOUS at 08:57

## 2020-02-28 RX ADMIN — LISINOPRIL 40 MG: 20 TABLET ORAL at 08:51

## 2020-02-28 RX ADMIN — CLONIDINE HYDROCHLORIDE 0.1 MG: 0.1 TABLET ORAL at 06:50

## 2020-02-28 RX ADMIN — ONDANSETRON 4 MG: 2 INJECTION INTRAMUSCULAR; INTRAVENOUS at 13:17

## 2020-02-28 RX ADMIN — HYDRALAZINE HYDROCHLORIDE 50 MG: 50 TABLET ORAL at 08:51

## 2020-02-28 RX ADMIN — ONDANSETRON 4 MG: 4 TABLET, ORALLY DISINTEGRATING ORAL at 06:50

## 2020-02-28 RX ADMIN — CLONIDINE HYDROCHLORIDE 0.1 MG: 0.1 TABLET ORAL at 13:42

## 2020-02-28 RX ADMIN — LACTOBACILLUS ACIDOPHILUS / LACTOBACILLUS BULGARICUS 1 PACKET: 100 MILLION CFU STRENGTH GRANULES at 21:29

## 2020-02-28 RX ADMIN — TORSEMIDE 100 MG: 20 TABLET ORAL at 12:58

## 2020-02-28 RX ADMIN — CHLORHEXIDINE GLUCONATE 0.12% ORAL RINSE 15 ML: 1.2 LIQUID ORAL at 21:21

## 2020-02-28 RX ADMIN — CALCIUM ACETATE 667 MG: 667 CAPSULE ORAL at 08:51

## 2020-02-28 RX ADMIN — HYDRALAZINE HYDROCHLORIDE 50 MG: 50 TABLET, FILM COATED ORAL at 17:39

## 2020-02-28 RX ADMIN — VALPROIC ACID 750 MG: 250 CAPSULE, LIQUID FILLED ORAL at 21:26

## 2020-02-28 RX ADMIN — HEPARIN SODIUM 5000 UNITS: 5000 INJECTION INTRAVENOUS; SUBCUTANEOUS at 21:23

## 2020-02-28 RX ADMIN — HEPARIN SODIUM 5000 UNITS: 5000 INJECTION INTRAVENOUS; SUBCUTANEOUS at 13:42

## 2020-02-28 RX ADMIN — MELATONIN 2000 UNITS: at 08:51

## 2020-02-28 RX ADMIN — ATORVASTATIN CALCIUM 40 MG: 40 TABLET, FILM COATED ORAL at 21:21

## 2020-02-28 RX ADMIN — NIFEDIPINE 60 MG: 60 TABLET, FILM COATED, EXTENDED RELEASE ORAL at 08:50

## 2020-02-28 RX ADMIN — LABETALOL HCL 300 MG: 200 TABLET, FILM COATED ORAL at 21:23

## 2020-02-28 RX ADMIN — LABETALOL HCL 300 MG: 200 TABLET, FILM COATED ORAL at 08:51

## 2020-02-28 RX ADMIN — INSULIN GLARGINE 14 UNITS: 100 INJECTION, SOLUTION SUBCUTANEOUS at 21:23

## 2020-02-28 RX ADMIN — NIFEDIPINE 60 MG: 60 TABLET, FILM COATED, EXTENDED RELEASE ORAL at 17:39

## 2020-02-28 RX ADMIN — VALPROIC ACID 750 MG: 250 CAPSULE, LIQUID FILLED ORAL at 08:50

## 2020-02-28 RX ADMIN — GABAPENTIN 100 MG: 100 CAPSULE ORAL at 21:23

## 2020-02-28 NOTE — SOCIAL WORK
CM was informed that pt is medically stable for dc today  CM was informed by [facility] liaison that pt is accepted and a bed is available today  Pt is for dc to [facility] room 458 at 1:30pm  CM notified pt, pt's bedside MIKAEL Loya, and pt's mother of dc time

## 2020-02-28 NOTE — DISCHARGE SUMMARY
Discharge Summary - [address] [facility]    Patient Information: Kisha Mckeon 37 y o  male MRN: [id number]  [facility] Encounter: [id number]    Discharging Physician / Practitioner: Orlin Wren MD  PCP: Dian Herman DO  Admission Date: 2/21/2020  Discharge Date: 02/28/20    Reason for Admission:  Seizures    Discharge Diagnoses:     Principal Problem (Resolved):    Status epilepticus ([facility] )  Active Problems:    Type 1 diabetes mellitus with hypoglycemia ([place] [address] )    History of lacunar cerebrovascular accident (CVA)    Homozygous MTHFR mutation C677T ([place] [address] )    End-stage renal disease on peritoneal dialysis ([place] [address] )    Renovascular hypertension    Dyslipidemia    Diabetic gastroparesis ([place] [address] )      Consultations During Hospital Stay:  · Neurology  · Nephrology  · Endocrinology  · Gastroenterology    Procedures Performed:   · Video EEG monitoring-no epileptiform activity seen  · MRI brain shows no evidence of acute infarct or intracranial hemorrhage  · X-ray of the elbow shows no evidence of fracture or dislocation  Left elbow joint effusion  In absence of acute trauma which would raise suspicion for occult fracture inflammatory arthropathy should be considered  Soft tissue swelling without soft tissue air  Significant Findings:   · Status epilepticus  · Left elbow joint effusion secondary to trauma    Incidental Findings:   · None     Test Results Pending at Discharge (will require follow up): · None     Outpatient Tests Requested:  · None    Complications:  None    Hospital Course: Fritz Bermudez is a 37 y o  male patient who originally presented to the hospital on 2/21/2020 due to seizures  Patient was transferred from [facility] for video EEG monitoring after being found to have seizures and was intubated  Patient was initially admitted into the ICU then extubated and transferred to the floor  Neurology was consulted    Video EEG monitoring was done but showed no epileptiform activity and was thought to be due to uncontrolled hypertension  They recommended keeping blood pressure below 354 systolic  Nephrology was consulted as patient is on peritoneal hemodialysis and they managed that, they also managed patient's hypertensive meds  Patient was placed on a well controlled hypertensive medication regimen prior to discharge  Endocrinology was consulted as patient had episodes of hyper and hypoglycemia as he is a type 1 diabetic  They adjusted his insulin regimen according to his glucose monitor  Gastroenterology was consulted as patient had episodes of nausea and vomiting after eating  They stated there was likely diabetic gastroparesis and needed outpatient follow-up  Patient was started on erythromycin and Zofran p r n  Patient was initially on Pepcid but should change to a PPI  Patient was noted to have left elbow swelling, an x-ray was done which showed joint effusion, after discussing with patient and patient's parents patient had fallen during his seizure episode and was likely due to trauma during his fall he was given Tylenol and ice packs and over time the swelling had decreased patient did not have any numbness or tingling  Condition at Discharge: fair     Discharge Day Visit / Exam:     Subjective:  No acute overnight events  Patient states he feels well  Vitals: Blood Pressure: 161/95 (02/28/20 0746)  Pulse: 82 (02/28/20 0746)  Temperature: 98 6 °F (37 °C) (02/28/20 0746)  Temp Source: Oral (02/28/20 0746)  Respirations: 18 (02/28/20 0746)  Height: 5' 11" (180 3 cm) (02/21/20 1300)  Weight - Scale: 100 kg (221 lb 5 5 oz)(pt clothed, no shoes ) (02/28/20 0852)  SpO2: 97 % (02/28/20 0746)  Exam:   Physical Exam   Constitutional: He is oriented to person, place, and time  He appears well-developed and well-nourished  HENT:   Head: Normocephalic and atraumatic     Mouth/Throat: Oropharynx is clear and moist    Eyes: Pupils are equal, round, and reactive to light  Conjunctivae are normal    Neck: Neck supple  No JVD present  Cardiovascular: Normal rate, regular rhythm, normal heart sounds and intact distal pulses  Pulmonary/Chest: Effort normal and breath sounds normal    Abdominal: Soft  Bowel sounds are normal    Musculoskeletal:   Left elbow swelling improving   Neurological: He is alert and oriented to person, place, and time  Skin: Skin is warm and dry  Capillary refill takes less than 2 seconds  Psychiatric: He has a normal mood and affect  His behavior is normal  Judgment and thought content normal    Nursing note and vitals reviewed  Discharge instructions/Information to patient and family:   See after visit summary for information provided to patient and family  Provisions for Follow-Up Care:  See after visit summary for information related to follow-up care and any pertinent home health orders  Disposition:     Acute Rehab at [address] [place] [address] SNF:   · Not Applicable to this Patient - Not Applicable to this Patient    Planned Readmission:  No     Discharge Statement:  I spent 38 minutes discharging the patient  This time was spent on the day of discharge  I had direct contact with the patient on the day of discharge  Greater than 50% of the total time was spent examining patient, answering all patient questions, arranging and discussing plan of care with patient as well as directly providing post-discharge instructions  Additional time then spent on discharge activities  Discharge Medications:  See after visit summary for reconciled discharge medications provided to patient and family  ** Please Note: Dragon 360 Dictation voice to text software may have been used in the creation of this document   **

## 2020-02-28 NOTE — PROGRESS NOTES
Progress Note Fausto Patino 1985, 37 y o  male MRN: [id number]    Unit/Bed#: [facility] 708-01 Encounter: [id number]    Primary Care Provider: Joe Banuelos DO   Date and time admitted to hospital: 2/21/2020 10:44 AM        Diabetic gastroparesis [facility])  Assessment & Plan  GI consulted, appreciate input  They have signed off  Continue erythromycin and famotidine, remains with symptoms of dyspepsia and bloating- will try Protonix instead of famotidine  Tolerating diet  Zofran p r n    Outpatient GI follow-up    Dyslipidemia  Assessment & Plan  Continue Lipitor    Renovascular hypertension  Assessment & Plan  Blood pressure better controlled  Management as per nephrology, appreciate input  Continue clonidine, hydralazine, Procardia, Cardura, torsemide    End-stage renal disease on peritoneal dialysis [facility])  Assessment & Plan  With Continue peritoneal dialysis  Management as per Nephrology  Monitor lytes    History of lacunar cerebrovascular accident (CVA)  Assessment & Plan  Continue aspirin and Lipitor    Type 1 diabetes mellitus with hypoglycemia [facility])  Assessment & Plan  Lab Results   Component Value Date    HGBA1C 5 6 02/21/2020       Recent Labs     02/27/20  0646 02/27/20  1044 02/27/20  1500 02/27/20  1600   POCGLU 209* 115 131 188*       Blood Sugar Average: Last 72 hrs:  (P) 853 [id number]     With episodes of hypo and hyperglycemia this hospitalization  Patient now with lower dextrose diasylate  Some hypoglycemia today, endocrine has titrated insulin- appreciate input  Continue Lantus 14 units at night, Humalog 4 units with meals, SSI, Accu-Cheks    * Status epilepticus ([address] )  Assessment & Plan  · Intubated then extubated 2/23  · Neurology consulted, appreciate input  · S/p EEG monitoring- no epileptiform activity seen as per Neurology  · As per Neurology thought to be due to uncontrolled hypertension  · Continue Depacon  · Outpatient Neurology follow-up  · Resolved      VTE Pharmacologic Prophylaxis:   Pharmacologic: Heparin  Mechanical VTE Prophylaxis in Place: Yes    Patient Centered Rounds: I have performed bedside rounds with nursing staff today  Discussions with Specialists or Other Care Team Provider:  Nephrology, Endocrinology    Education and Discussions with Family / Patient:  Discussed treatment plan with patient and mother    Time Spent for Care: 45 minutes  More than 50% of total time spent on counseling and coordination of care as described above  Current Length of Stay: 6 day(s)    Current Patient Status: Inpatient   Certification Statement: The patient will continue to require additional inpatient hospital stay due to Awaiting placement    Discharge Plan:  Rehab    Code Status: Level 1 - Full Code      Subjective:   No acute overnight events  This morning patient had some nausea  This afternoon patient had hypoglycemia that persisted afternoon despite eating well  Objective:     Vitals:   Temp (24hrs), Av 3 °F (36 8 °C), Min:98 2 °F (36 8 °C), Max:98 4 °F (36 9 °C)    Temp:  [98 2 °F (36 8 °C)-98 4 °F (36 9 °C)] 98 2 °F (36 8 °C)  HR:  [74-86] 75  Resp:  [19] 19  BP: (148-160)/(88-94) 148/88  SpO2:  [97 %-98 %] 97 %  Body mass index is 30 75 kg/m²  Input and Output Summary (last 24 hours): Intake/Output Summary (Last 24 hours) at 2020 193  Last data filed at 2020 1700  Gross per 24 hour   Intake 810 ml   Output    Net 810 ml       Physical Exam:     Physical Exam   Constitutional: He is oriented to person, place, and time  He appears well-developed and well-nourished  HENT:   Head: Normocephalic and atraumatic  Mouth/Throat: Oropharynx is clear and moist    Eyes: Pupils are equal, round, and reactive to light  Conjunctivae are normal    Neck: Neck supple  No JVD present  Cardiovascular: Normal rate, regular rhythm, normal heart sounds and intact distal pulses  Pulmonary/Chest: Effort normal and breath sounds normal    Abdominal: Soft  Bowel sounds are normal    Musculoskeletal: He exhibits no edema or tenderness  Neurological: He is alert and oriented to person, place, and time  Skin: Skin is warm and dry  Capillary refill takes less than 2 seconds  Psychiatric: He has a normal mood and affect  His behavior is normal  Judgment and thought content normal    Nursing note and vitals reviewed  Additional Data:     Labs:    Results from last 7 days   Lab Units 02/25/20  0759   WBC Thousand/uL 8 70   HEMOGLOBIN g/dL 8 6*   HEMATOCRIT % 25 4*   PLATELETS Thousands/uL 249   NEUTROS PCT % 74   LYMPHS PCT % 10*   MONOS PCT % 12   EOS PCT % 2     Results from last 7 days   Lab Units 02/26/20  0500  02/22/20  0501   SODIUM mmol/L 134*   < > 139   POTASSIUM mmol/L 3 8   < > 4 9   CHLORIDE mmol/L 98*   < > 100   CO2 mmol/L 22   < > 25   BUN mg/dL 50*   < > 54*   CREATININE mg/dL 14 50*   < > 16 20*   ANION GAP mmol/L 14*   < > 14*   CALCIUM mg/dL 8 9   < > 8 6   ALBUMIN g/dL  --   --  3 0*   TOTAL BILIRUBIN mg/dL  --   --  0 47   ALK PHOS U/L  --   --  65   ALT U/L  --   --  16   AST U/L  --   --  23   GLUCOSE RANDOM mg/dL 193*   < > 223*    < > = values in this interval not displayed  Results from last 7 days   Lab Units 02/27/20  1600 02/27/20  1500 02/27/20  1044 02/27/20  0646 02/27/20  0253 02/26/20  2110 02/26/20  1558 02/26/20  1348 02/26/20  1329 02/26/20  1103 02/26/20  0625 02/26/20  0212   POC GLUCOSE mg/dl 188* 131 115 209* 236* 80 95 63* 53* 112 210* 175*     Results from last 7 days   Lab Units 02/21/20  0429   HEMOGLOBIN A1C % 5 6     Results from last 7 days   Lab Units 02/21/20  1150 02/21/20  0945   LACTIC ACID mmol/L 3 5* 5 1*           * I Have Reviewed All Lab Data Listed Above  * Additional Pertinent Lab Tests Reviewed:  All Labs Within Last 24 Hours Reviewed    Imaging:    Imaging Reports Reviewed Today Include: none  Imaging Personally Reviewed by Myself Includes:  none    Recent Cultures (last 7 days):           Last 24 Hours Medication List:     Current Facility-Administered Medications:  acetaminophen 650 mg Oral Q8H PRN Lorna Deed, DO   aspirin 81 mg Oral Daily Anabel Marion, DO   atorvastatin 40 mg Oral HS Anabel Marion, DO   calcium acetate 667 mg Oral TID With Meals Lorna Deed, DO   chlorhexidine 15 mL Swish & Spit Q12H [facility] Anabel ROSS, DO   cholecalciferol 2,000 Units Oral Daily Anabel Marion, DO   cinacalcet 90 mg Oral Daily Anabel Marion, DO   cloNIDine 0 1 mg Oral Q8H [facility] Anabel Marion, DO   dextrose 13 mL Intravenous Once Lorna Deed, DO   dextrose 25 g Intravenous PRN Steffi Doan MD   diphenhydrAMINE (BENADRYL) IVPB 50 mg Intravenous Daily PRN Lorna Deed, DO   doxazosin 2 mg Oral HS Stefan Aparicio, DO   famotidine 20 mg Oral HS Steffi Doan MD   gabapentin 100 mg Oral HS Anabel Marion, DO   gentamicin 1 application Topical Daily Anabel Marion, DO   heparin (porcine) 5,000 Units Subcutaneous Q8H 2701 Bhaskar Dunaway, DO   hydrALAZINE 20 mg Intravenous Q4H PRN Anabel Marion, DO   hydrALAZINE 50 mg Oral TID Lorna Deed, DO   insulin glargine 14 Units Subcutaneous HS Lorna Deed, DO   insulin lispro 1-5 Units Subcutaneous TID AC Alphonso Shen MD   insulin lispro 1-5 Units Subcutaneous HS Alphonso Shen MD   insulin lispro 4 Units Subcutaneous TID With Meals Joce Wang MD   labetalol 300 mg Oral Q12H [facility] Anabel Marion, DO   Labetalol HCl 20 mg Intravenous Q2H PRN Anabel Marion, DO   lactobacillus acidophilus-bulgaricus 1 packet Oral HS Anabel Marion, DO   lisinopril 40 mg Oral Daily Anabel Marion, DO   NIFEdipine 60 mg Oral BID Stefan Aparicio,    ondansetron 4 mg Intravenous Q4H PRN Steffi Doan MD   ondansetron 4 mg Oral [facility] Joce Wang MD   [START ON 2/28/2020] pantoprazole 40 mg Oral Early Morning Steffi Doan MD   torsemide 100 mg Oral Daily With [place], DO   valproic acid 750 mg Oral Q12H Wilford Siemens, MD        Today, Patient Was Seen By: Army Tushar MD    ** Please Note: Dictation voice to text software may have been used in the creation of this document   **

## 2020-02-28 NOTE — CONSULTS
Internal Medicine Consultation Note    Patient: Gabe Mendez  Age/sex: 37 y o  male  Medical Record #: [id number]    Assessment/Plan    New seizures  · Had status epilepticus on admission  · Per Neurology strong suspicion for provoked seizure in the setting of uncontrolled HTN  · Continue VPA  · Outpt follow-up with Epilepsy team     HTN  · Has been improving  · Continue current medications  · Nephrology was managing inpatient  DM type 1  · HA1C 5 6  · Continue insulin as ordered  · Continue SSI and accuchecks  Adjust medications as needed  · Endocrinology following  ESRD on peritoneal dialysis  · Nephrology to manage  Diabetic gastroparesis  · Suspected etiology of nausea and emesis  · Continue Protonix and ATC Zofran  · Outpt GI follow-up  History of cerebellar stroke  · Continue ASA 81mg daily  · Continue risk factor modification  Subjective/ HPI: Patient seen and examined  He is a 44yo male, with DM type 1, HTN, hyperlipidemia, homozygous MTHFR mutation, heterozygous for prothrombin 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 mutation, ESRD on peritoneal dialysis and history of cerebellar stroke, who presented to [facility] 2/20/2020 with worsening vertigo, Rt arm jerking and word finding difficulties over the past 2 days  When he was seen by Neurology he was actively seizing  He was given Ativan, Versed and Depacon however he continued to seizure  He was intubated and started on a Propofol drip  He was transferred to [facility] for continuous EEG monitoring  Depakote level initially low and he was given a bolus  He had no further seizure activity and video EEG was discontinued 2/24/2020  He was able to be extubated  Brain MRI did not reveal any acute findings  He did have persistent nausea and emesis post-extubation  He was seen by GI and treated with Protonix, Zofran and initially erythromycin due to diabetic gastroparesis  ROS:   A 10 point ROS was performed; negative except as noted above  Social History:    Substance Use History:   Social History     Substance and Sexual Activity   Alcohol Use Not Currently    Comment: rarely     Social History     Tobacco Use   Smoking Status Former Smoker    Packs/day: 0 50    Years: 12 00    Pack years: 6 00    Types: Cigarettes    Last attempt to quit: 2018    Years since quittin 0   Smokeless Tobacco Never Used   Tobacco Comment    quit 2018     Social History     Substance and Sexual Activity   Drug Use Yes    Frequency: 5 0 times per week    Types: Marijuana    Comment: medical       Family History:    Family History   Problem Relation Age of Onset   iMguel Carcamo Breast cancer Mother     Hypertension Mother     Hyperlipidemia Father     Hypertension Father     Leukemia Maternal Grandmother     Hyperlipidemia Maternal Grandfather     Hypertension Maternal Grandfather     Hyperlipidemia Paternal Grandmother     Hypertension Paternal Grandmother     Heart disease Paternal Grandfather         cardiac disorder    Diabetes Paternal Grandfather          Review of Scheduled Meds:    Current Facility-Administered Medications:  acetaminophen 650 mg Oral Q8H PRN Bautista Price MD   [START ON 2020] aspirin 81 mg Oral Daily Mateo Vincent MD   atorvastatin 40 mg Oral HS Mateo Vincent MD   calcium acetate 667 mg Oral TID With Meals Bautista Price MD   chlorhexidine 15 mL Swish & Spit Q12H [address] [employer] MD Jesi   [START ON 2020] cholecalciferol 2,000 Units Oral Daily Bautista Price MD   [START ON 2020] cinacalcet 90 mg Oral Daily Mateo Vincent MD   cloNIDine 0 1 mg Oral Q8H [address] Mateo Vincent MD   dextrose 25 g Intravenous PRN Mateo Vincent MD   diphenhydrAMINE (BENADRYL) IVPB 50 mg Intravenous Daily PRN Mateo Vincent MD   doxazosin 2 mg Oral HS Mateo Vincent MD   gabapentin 100 mg Oral HS Mateo Vincent MD   gentamicin 1 application Topical Daily Mateo Vincent MD   heparin (porcine) 5,000 Units Subcutaneous Q8H [address] [employer] MD Jesi   hydrALAZINE 20 mg Intravenous Q4H PRN Mateo Vincent MD   hydrALAZINE 50 mg Oral TID Mateo Vincent MD   insulin glargine 14 Units Subcutaneous HS Mateo Vincent MD   insulin lispro 1-5 Units Subcutaneous TID AC Mateo Vincent MD   insulin lispro 1-5 Units Subcutaneous HS Mateo Vincent MD   insulin lispro 4 Units Subcutaneous TID With Meals Mateo Vincent MD   labetalol 300 mg Oral Q12H [facility] Mateo Vincent MD   Labetalol HCl 20 mg Intravenous Q2H PRN Mateo Vincent MD   lactobacillus acidophilus-bulgaricus 1 packet Oral HS Cate Tran MD   [START ON 2/29/2020] lisinopril 40 mg Oral Daily Mateo Vincent MD   NIFEdipine 60 mg Oral BID Mateo Vincent MD   ondansetron 4 mg Intravenous Q4H PRN Mateo Vincent MD   ondansetron 4 mg Oral Q8H [facility] Cate Tran MD   [START ON 2/29/2020] pantoprazole 40 mg Oral Early Morning Cate Tran MD   [START ON 2/29/2020] torsemide 100 mg Oral Daily With Lunch Cate Tran MD   valproic acid 750 mg Oral Q12H [facility] Mateo Vincent MD       Labs:     Results from last 7 days   Lab Units 02/25/20  0759 02/24/20  0601   WBC Thousand/uL 8 70 9 90   HEMOGLOBIN g/dL 8 6* 9 1*   HEMATOCRIT % 25 4* 28 1*   PLATELETS Thousands/uL 249 242     Results from last 7 days   Lab Units 02/26/20  0500 02/25/20  0759   SODIUM mmol/L 134* 134*   POTASSIUM mmol/L 3 8 4 1   CHLORIDE mmol/L 98* 99*   CO2 mmol/L 22 22   BUN mg/dL 50* 47*   CREATININE mg/dL 14 50* 14 50*   CALCIUM mg/dL 8 9 8 8                Results from last 7 days   Lab Units 02/28/20  1114 02/28/20  0634 02/28/20  0213   POC GLUCOSE mg/dl 162* 139 177*       Lab Results   Component Value Date    BLOODCX No Growth After 5 Days  12/09/2019    BLOODCX No Growth After 5 Days  12/09/2019    BLOODCX No Growth After 5 Days  12/09/2019    BLOODCX No Growth After 5 Days   12/09/2019    URINECX No Growth <1000 cfu/mL 12/10/2019    WOUNDCULT No growth 06/25/2019       Input and Output Summary (last 24 hours):     No intake or output data in the 24 hours ending 20 1442    Imaging:     No orders to display       *Labs /Radiology studiesReviewed  *Medications reviewed and reconciled as needed  *Please refer to order section for additional ordered labs studies  *Case discussed with primary attending during morning huddle case rounds    Vitals:   Temp (24hrs), Av 1 °F (36 7 °C), Min:97 6 °F (36 4 °C), Max:98 6 °F (37 °C)    Temp:  [97 6 °F (36 4 °C)-98 6 °F (37 °C)] 97 9 °F (36 6 °C)  HR:  [74-82] 76  Resp:  [18] 18  BP: (141-161)/(77-95) 141/77  SpO2:  [96 %-98 %] 98 %  There is no height or weight on file to calculate BMI  Physical Exam:   HEENT:  Head: Normocephalic, no lesions, without obvious abnormality  Neck / Thyroid: Supple, no masses, nodes, nodules or enlargement  CARDIAC:  regular rate and rhythm, S1, S2 normal, no murmur, click, rub or gallop  LUNGS:  normal air entry, lungs clear to auscultation and air entry:  good  ABDOMEN:  soft, non-tender  Bowel sounds normal  No masses, no organomegaly  EXTREMITIES:  extremities normal, warm and well-perfused; no cyanosis, clubbing, or edema  NEURO:   mental status, speech normal, alert and oriented x3, TIANNA and Dysmetria Rt UE on finger to nose testing  Rt ptosis  PSYCH:  Alert and oriented, appropriate affect  Invasive Devices     Peripheral Intravenous Line            Peripheral IV 20 Right Forearm 2 days                   Code Status: Level 1 - Full Code  Current Length of Stay: 0 day(s)    Total floor / unit time spent today 1 hour with more than 50% spent counseling/coordinating care  Counseling includes discussion with patient re: progress  and discussion with patient of his/her current medical state/information  Coordination of patient's care was performed in conjunction with primary service  Time invested included review of patient's labs, vitals, and management of their comorbidities with continued monitoring  In addition, this patient was discussed with medical team including physician and advanced extenders  The care of the patient was extensively discussed and appropriate treatment plan was formulated unique for this patient  ** Please Note: Fluency Direct voice to text software may have been used in the creation of this document   Audio transcription errors may occur**

## 2020-02-28 NOTE — RESTORATIVE TECHNICIAN NOTE
Restorative Specialist Mobility Note       Activity: Ambulate in gonzalez, Ambulate in room, Bathroom privileges, Chair, Dangle, Stand at bedside(Educated/encouraged pt to ambulate with assistance 3-4 x's/day  Chair alarm on   Pt callbell, phone/tray within reach )     Assistive Device: Front wheel walker       [employer] BS, Restorative Technician, [employer]

## 2020-02-28 NOTE — ASSESSMENT & PLAN NOTE
GI consulted, appreciate input  They have signed off  Continue erythromycin and famotidine, remains with symptoms of dyspepsia and bloating- will try Protonix instead of famotidine  Tolerating diet  Zofran p r n    Outpatient GI follow-up

## 2020-02-28 NOTE — ASSESSMENT & PLAN NOTE
Blood pressure better controlled  Management as per nephrology, appreciate input  Continue clonidine, hydralazine, Procardia, Cardura, torsemide

## 2020-02-28 NOTE — PROGRESS NOTES
NEPHROLOGY PROGRESS NOTE   Jose Mckeon 37 y o  male MRN: [id number]  Unit/Bed#: [facility] 708-01 Encounter: [id number]      ASSESSMENT/PLAN:  1  ESRD:  On [facility]CPD and follows at [facility]  · Current orders: 10 hours, 12 5L total volume, 2 2L exchanges with 1 5L last fill   · EDW 94kg  · Continue all 1 5% with icodextran last fill   2  Status epilepticus: neuro on board and felt to be provoked seizure from uncontrolled HTN  3  Anemia of CKD:  HETAL on hold due to possible seizures/hypertension but can hopefully resume since BP better   · Last hgb 8 6  4  Mineral bone disease of CKD:  Continue calcium acetate with meals, Sensipar and vitamin-D replacement  5  Hypertension:  Blood pressure improving  Nifedipine was increased to bid yesterday so will trend for now   · Currently on clonidine 0 1mg q8h, hydralazine 50mg tid, labetalol 300mg q12h, lisinopril 40mg daily, nifedipine 60mg bid, torsemide 100mg daily and doxazosin mg daily which was added last night   6  DM I    Likely being d/c today to [facility]--ok from renal standpoint     SUBJECTIVE:  Feeling better each day  Had some nausea with meds and vomit x 1 last night  No current chest pain or shortness of breath       OBJECTIVE:  Current Weight: Weight - Scale: 100 kg (221 lb 5 5 oz)(pt clothed, no shoes )  Vitals:    02/28/20 0746   BP: 161/95   Pulse: 82   Resp: 18   Temp: 98 6 °F (37 °C)   SpO2: 97%       Intake/Output Summary (Last 24 hours) at 2/28/2020 1003  Last data filed at 2/27/2020 1700  Gross per 24 hour   Intake 630 ml   Output    Net 630 ml       General:  No acute distress  Skin:  No rash  Eyes:  Sclerae anicteric  ENT:  Moist mucous membranes  Neck:  Trachea midline with no JVD  Chest:  Clear to auscultation bilaterally  CVS:  Regular rate and rhythm  Abdomen:  Soft, nontender, nondistended  Extremities:  No edema  Neuro:  Awake and alert  Psych:  Appropriate affect      Medications:  Scheduled Meds:    Current Facility-Administered Medications:  acetaminophen 650 mg Oral Q8H PRN Tejal Smiley, DO   aspirin 81 mg Oral Daily Anabel Schultz, DO   atorvastatin 40 mg Oral HS Anabel Schultz, DO   calcium acetate 667 mg Oral TID With Meals Tejal Smiley, DO   chlorhexidine 15 mL Swish & Spit Q12H [facility] Anabel HYDE, DO   cholecalciferol 2,000 Units Oral Daily Anabel Marion, DO   cinacalcet 90 mg Oral Daily Anabel Marion, DO   cloNIDine 0 1 mg Oral Q8H [facility] Anabel Schultz, DO   dextrose 13 mL Intravenous Once Tejal Smiley, DO   dextrose 25 g Intravenous PRN Raciel Perez MD   diphenhydrAMINE (BENADRYL) IVPB 50 mg Intravenous Daily PRN Tejal Smiley, DO   doxazosin 2 mg Oral HS [place] Aparicio, DO   gabapentin 100 mg Oral HS Anabel Schultz, DO   gentamicin 1 application Topical Daily Anabel Schultz, DO   heparin (porcine) 5,000 Units Subcutaneous Q8H 2701 Bhaskar Dunaway, DO   hydrALAZINE 20 mg Intravenous Q4H PRN Anabel Schultz, DO   hydrALAZINE 50 mg Oral TID Tejal Smiley, DO   insulin glargine 14 Units Subcutaneous HS Tejal Smiley, DO   insulin lispro 1-5 Units Subcutaneous TID AC Milla Santiago MD   insulin lispro 1-5 Units Subcutaneous HS Milla Santiago MD   insulin lispro 4 Units Subcutaneous TID With Meals Reg Joe MD   labetalol 300 mg Oral Q12H [facility] Anabel Schultz, DO   Labetalol HCl 20 mg Intravenous Q2H PRN Anabel Schultz, DO   lactobacillus acidophilus-bulgaricus 1 packet Oral HS Anabel Schultz, DO   lisinopril 40 mg Oral Daily Tejal Schustert, DO   NIFEdipine 60 mg Oral BID Matt Aparicio, DO   ondansetron 4 mg Intravenous Q4H PRN Raciel Perez MD   ondansetron 4 mg Oral [facility] Reg Joe MD   pantoprazole 40 mg Oral Early Morning Raciel Perez MD   torsemide 100 mg Oral Daily With [employer], DO   valproic acid 750 mg Oral Q12H Licha Arellano MD       PRN Meds:   acetaminophen    dextrose    diphenhydrAMINE (BENADRYL) IVPB    hydrALAZINE    Labetalol HCl   ondansetron    Laboratory Results:  Results from last 7 days   Lab Units 02/26/20  0500 02/25/20  0759 02/24/20  0601 02/23/20  0437 02/22/20  0501 02/21/20  1150   WBC Thousand/uL  --  8 70 9 90 10 91* 12 32* 8 65   HEMOGLOBIN g/dL  --  8 6* 9 1* 9 5* 9 7* 9 2*   HEMATOCRIT %  --  25 4* 28 1* 29 2* 30 1* 27 2*   PLATELETS Thousands/uL  --  249 242 254 249 253   SODIUM mmol/L 134* 134* 142 141 139 137   POTASSIUM mmol/L 3 8 4 1 3 7 3 7 4 9 4 8   CHLORIDE mmol/L 98* 99* 105 103 100 100   CO2 mmol/L 22 22 23 24 25 24   BUN mg/dL 50* 47* 47* 52* 54* 50*   CREATININE mg/dL 14 50* 14 50* 15 40* 15 40* 16 20* 15 20*   CALCIUM mg/dL 8 9 8 8 9 7 9 2 8 6 8 9   MAGNESIUM mg/dL  --   --   --   --   --  2 8*   PHOSPHORUS mg/dL  --   --  8 8*  --   --   --

## 2020-02-28 NOTE — PROGRESS NOTES
Endocrinology Progress Note   Unit/Bed#: [facility] 708-01   Encounter: [id number]      Sruthi Mckeon 37 y o  male   MRN: [id number]  Hospital Stay Days: 7    Assessment/Plan:     Assessment:  28-year-old male with type 1 diabetes as his renal disease on peritoneal dialysis with a new onset seizure with known history of strokes       Plan:     1  Type 1 diabetes with microvascular and macrovascular complications,   In last 24 hours, blood glucose has been able with episodes of hypoglycemia throughout afternoon yesterday and in the 200s overnight as well as 200 this morning  He was alternating between 1 25% and 2 5% dextrose bags for peritoneal dialysis  Morning Lantus was stopped yesterday due to episodes of hypoglycemia throughout the day yesterday and to prevent further episodes  · Continue Lantus 14 units at night   · Continue Humalog 4 units TID with meals with ISS Algorithm #1  · Continue diabetic diet- and reinforced small more frequent meals     2  Diabetic gastroparesis  · Encourage frequent shorter meals  · Zofran 30 minutes before meals      Subjective:   Patient seen and examined  No acute events overnight  Patient does state yesterday had episode of vomiting  Patient also had distant hypoglycemia throughout the day yesterday of which ended around dinner time  Morning dose of long-acting insulin was held this morning  Patient still complaining of intermittent nausea  Vitals: Temp (24hrs), Av 2 °F (36 8 °C), Min:97 6 °F (36 4 °C), Max:98 6 °F (37 °C)   Temperature: 98 6 °F (37 °C)  Vitals:    20 2202 20 0600 20 0746 20 0852   BP: 147/78  161/95    Pulse: 75  82    Resp:       Temp: 97 6 °F (36 4 °C)  98 6 °F (37 °C)    TempSrc:       SpO2: 96%  97%    Weight:  101 kg (222 lb 14 2 oz)  100 kg (221 lb 5 5 oz)   Height:          Body mass index is 30 87 kg/m²  I/O last 24 hours:   In: 80 [P O :810]  Out: -     Physical Exam   Constitutional: He appears well-developed and well-nourished  No distress  HENT:   Mouth/Throat: Oropharynx is clear and moist  No oropharyngeal exudate  Cardiovascular: Normal rate, regular rhythm and normal heart sounds  Pulmonary/Chest: Effort normal and breath sounds normal  No respiratory distress  Abdominal:   PD    Musculoskeletal: He exhibits no edema  Invasive Devices     Peripheral Intravenous Line            Peripheral IV 02/26/20 Right Forearm 1 day                Labs: I have personally reviewed pertinent reports  Recent Results (from the past 24 hour(s))   Fingerstick Glucose (POCT)    Collection Time: 02/27/20 10:44 AM   Result Value Ref Range    POC Glucose 115 65 - 140 mg/dl   Fingerstick Glucose (POCT)    Collection Time: 02/27/20  3:00 PM   Result Value Ref Range    POC Glucose 131 65 - 140 mg/dl   Fingerstick Glucose (POCT)    Collection Time: 02/27/20  4:00 PM   Result Value Ref Range    POC Glucose 188 (H) 65 - 140 mg/dl   Fingerstick Glucose (POCT)    Collection Time: 02/27/20  8:34 PM   Result Value Ref Range    POC Glucose 159 (H) 65 - 140 mg/dl   Fingerstick Glucose (POCT)    Collection Time: 02/28/20  2:13 AM   Result Value Ref Range    POC Glucose 177 (H) 65 - 140 mg/dl   Fingerstick Glucose (POCT)    Collection Time: 02/28/20  6:34 AM   Result Value Ref Range    POC Glucose 139 65 - 140 mg/dl     Radiology Results: I have personally reviewed pertinent reports  Xr Chest Portable    Result Date: 2/21/2020  Impression: No acute cardiopulmonary disease  ET tube 5 cm above the julia  Workstation performed: [id number]     Xr Elbow 3+ Vw Left    Result Date: 2/26/2020  Impression: No evidence of fracture or dislocation  Left elbow joint effusion  In the absence of acute trauma (which would raise suspicion for occult fracture), an inflammatory arthropathy should be considered  Soft tissue swelling without soft tissue air   The examination demonstrates a significant  finding and was documented as such in Epic for liaison and referring practitioner notification  Workstation performed: [id number]     Xr Abdomen 1 View Kub    Result Date: 2/24/2020  Impression: Unremarkable abdomen  Workstation performed: [id number]     Other Diagnostic Testing: I have personally reviewed pertinent reports        Active Meds:   Current Facility-Administered Medications   Medication Dose Route Frequency    acetaminophen (TYLENOL) tablet 650 mg  650 mg Oral Q8H PRN    aspirin chewable tablet 81 mg  81 mg Oral Daily    atorvastatin (LIPITOR) tablet 40 mg  40 mg Oral HS    calcium acetate (PHOSLO) capsule 667 mg  667 mg Oral TID With Meals    chlorhexidine (PERIDEX) 0 12 % oral rinse 15 mL  15 mL Swish & Spit Q12H [address]    cholecalciferol (VITAMIN D3) tablet 2,000 Units  2,000 Units Oral Daily    cinacalcet (SENSIPAR) tablet 90 mg  90 mg Oral Daily    cloNIDine (CATAPRES) tablet 0 1 mg  0 1 mg Oral Q8H [address]    dextrose 50 % IV solution 13 mL  13 mL Intravenous Once    dextrose 50 % IV solution 25 g  25 g Intravenous PRN    diphenhydrAMINE (BENADRYL) 50 mg in sodium chloride 0 9 % 50 mL IVPB  50 mg Intravenous Daily PRN    doxazosin (CARDURA) tablet 2 mg  2 mg Oral HS    gabapentin (NEURONTIN) capsule 100 mg  100 mg Oral HS    gentamicin (GARAMYCIN) 0 1 % ointment 1 application  1 application Topical Daily    heparin (porcine) subcutaneous injection 5,000 Units  5,000 Units Subcutaneous Q8H [address]    hydrALAZINE (APRESOLINE) injection 20 mg  20 mg Intravenous Q4H PRN    hydrALAZINE (APRESOLINE) tablet 50 mg  50 mg Oral TID    insulin glargine (LANTUS) subcutaneous injection 14 Units 0 14 mL  14 Units Subcutaneous HS    insulin lispro (HumaLOG) 100 units/mL subcutaneous injection 1-5 Units  1-5 Units Subcutaneous TID AC    insulin lispro (HumaLOG) 100 units/mL subcutaneous injection 1-5 Units  1-5 Units Subcutaneous HS    insulin lispro (HumaLOG) 100 units/mL subcutaneous injection 4 Units  4 Units Subcutaneous TID With Meals    labetalol (NORMODYNE) tablet 300 mg  300 mg Oral Q12H [address]    Labetalol HCl (NORMODYNE) injection 20 mg  20 mg Intravenous Q2H PRN    lactobacillus acidophilus-bulgaricus (FLORANEX) packet 1 packet  1 packet Oral HS    lisinopril (ZESTRIL) tablet 40 mg  40 mg Oral Daily    NIFEdipine (PROCARDIA XL) 24 hr tablet 60 mg  60 mg Oral BID    ondansetron (ZOFRAN) injection 4 mg  4 mg Intravenous Q4H PRN    ondansetron (ZOFRAN-ODT) dispersible tablet 4 mg  4 mg Oral Q8H [address]    pantoprazole (PROTONIX) EC tablet 40 mg  40 mg Oral Early Morning    torsemide (DEMADEX) tablet 100 mg  100 mg Oral Daily With Lunch    valproic acid (DEPAKENE) capsule 750 mg  750 mg Oral Q12H [facility]I Shin

## 2020-02-28 NOTE — PROGRESS NOTES
PHYSICAL MEDICINE AND REHABILITATION   PREADMISSION ASSESSMENT     Projected [facility] Diagnoses:  Impairment of mobility, safety and Activities of Daily Living (ADLs) due to Neurologic Conditions:  03 9  Other Neurologic Disorder    Etiologic Dx: New Onset Seizures due to Uncontrolled Hypertension  Date of Onset: 2/20/2020   Date of surgery: N/A    PATIENT INFORMATION  Name: Marla Monzon Phone #: [phone] (home)   [place] [address]  YOB: 1985 Age: 37 y o  SS#   Marital Status: Single  Ethnicity:   Employment Status: on disability  Extended Emergency Contact Information  Primary Emergency Contact: Francine Mckeon  Address: [address] [place] [address] Phone: [phone]  Relation: Mother  Secondary Emergency Contact: Seaford Ibrahima  Mobile Phone: [phone]  Relation: Father  Advance Directive: Level 1 Full Code - unknown advanced directive    INSURANCE/COVERAGE:     Primary Payor: MEDICARE / Plan: MEDICARE A AND B / Product Type: Medicare A & B Fee for Service /   Secondary Payer: [employer]   Payer Contact:  Payer Contact:   Contact Phone:  Contact Phone:     Authorization #:   Coverage Dates:  LCD:   MEDICARE #: [id number]  Medicare Days: 60/30/60  Medical Record #: [id number]    REFERRAL SOURCE:   Referring provider: Josh Gee MD  Referring facility: [address] [place] [address]  Room: [place] [address] [address]  PCP: Julián Coleman DO PCP phone number: [phone]    MEDICAL INFORMATION  HPI: Patient is a 37year old male that presented to [address] [place] [address] on 2/20/2020 with complaints of dizziness x2 days  Patient has a history of CVA x2 with resultant chronic vertigo  Patient also states some word-finding difficulty  Blood pressure was noted to be elevated at 189/93, and a stroke alert was called   CT of the head was negative, and EKG showed NSR  Patient was with an NIHSS of 0, and not a tPA candidate  Patient was continued on ASA and statin  MRI of the brain was negative  On 2/21, a RRT was called after patient was found on the floor actively seizing  Patient was poorly responsive with alternating left/right deviated gaze  Patient was emergently intubated  Patient received 6mg IV Ativan, and was loaded with Valproic Acid  Patient required transfer to ICU, heavily sedated  Neurology was consulted, with recommendations to continue with IV Depacon and initiated vEEG monitoring  Patient transferred to [facility] for vEEG monitoring  Patient required a Cardene gtt to maintain SBP less than 180  Overnight 2/22, patient required multiple IV medications for HTN  Patient was extubated without difficulty  Nephrology was consulted due to patient's history of ESRD and PD management, as well as HTN medication recommendations  Overnight 2/23, patient experienced an episode of confusion that self-resolved without convulsive activity  Depacon level was subtherapeutic, requiring re-dosing per Neurology  Patient remained NPO due to nausea/vomiting, likely due to patient's history of gastroparesis  For this reason, patient was initiated on an Insulin gtt  GI was consulted, with abdominal x-ray unremarkable  Patient was continued on Erythromycin, PPI and Zofran prn  Patient is to follow-up outpatient  Overnight 2/24, Cardene gtt was discontinued, however patient still required multiple doses of IV medications to maintain BP  vEEG was discontinued, as no electrographic seizures or interictal epileptiform discharges were seen  Background activities with diffuse delta activities were too slow suggesting mild to moderate diffuse cerebral dysfunction of non-specific etiology; more prominent delta activities in left hemisphere suggesting slightly worse cerebral dysfunction in left hemisphere compared to right   Per Neurology, there was strong suspicion for provoked seizure in setting of uncontrolled HTN, with concern for rebound effects of Clonidine contributing to uncontrolled HTN  Given suspicion for provoked seizure, patient wound not need to remain on AEDs  However, patient was with witnessed left facial twitching (? Myoclonic) in setting of elevated BP (176/85)  Depacon dose was reduced to 750mg BID with recommendations for patient to follow-up outpatient with Epilepsy clinic  Patient was also recommended to maintain blood pressure journal and record TID  Insulin gtt was discontinued, and patient tolerating diet  Endocrinology was consulted regarding DM management, with insulin changes made as needed  Patient is to continue on Clonidine, Hydralazine, Procardia, Cardura and Torsemide per Nephrology recommendations  Patient is currently on a Diabetic diet, and is tolerating well  Patient is hemodynamically stable, with noted improvement in blood pressures without seizure activity, and is medically cleared for discharge to [facility] this afternoon  PT/OT therapies were consulted, and they are recommending patient for inpatient Acute Rehab  He has demonstrated that he can tolerate and participate in 3 hours of therapy per day  Past Medical History:   Past Surgical History:    Allergies:     Past Medical History:   Diagnosis Date    Acute kidney injury ([address] )     Anxiety     Cerebellar stroke side undetermined 2015 2015,1/2018    Diabetes type 1, controlled ([facility] [address]     IDDM    Diarrhea     Gastroparesis     History of shingles 2010    History of transfusion 02/2018    Hypertension     Muscle weakness     general unsteadiness    Retinopathy     Past Surgical History:   Procedure Laterality Date    CARDIAC LOOP RECORDER  05/2018    EGD      EYE SURGERY      PERITONEAL CATHETER INSERTION N/A 8/27/2018    Procedure: UNROOF PD CATHETER;  Surgeon: Erika Solano DO;  Location: [facility];  Service: General    MS ESOPHAGOGASTRODUODENOSCOPY TRANSORAL DIAGNOSTIC N/A 4/18/2019    Procedure: ESOPHAGOGASTRODUODENOSCOPY (EGD); Surgeon: Adamaris Ricketts MD;  Location: [facility] LAB; Service: Gastroenterology    WI LAP INSERTION TUNNELED INTRAPERITONEAL CATHETER N/A 8/6/2018    Procedure: LAPAROSCOPIC PD CATHETER PLACEMENT;  Surgeon: Félix Paris DO;  Location[facility];  Service: General    TONSILLECTOMY       Allergies   Allergen Reactions    Sulfa Antibiotics Rash         Comorbidities/Surgeries in the last 100 days: Vertigo, hypertensive urgency, new onset seizures, acute respiratory failure, hypo/hyperglycemia, anxiety, CVA with resultant chronic vertigo 2015 & 2018, DM1, gastroparesis, HTN, ESRD on PD, former smoker  CURRENT VITAL SIGNS:   Temp:  [97 6 °F (36 4 °C)-98 6 °F (37 °C)] 98 6 °F (37 °C)  HR:  [74-82] 82  Resp:  [18] 18  BP: (147-161)/(78-95) 161/95   Intake/Output Summary (Last 24 hours) at 2/28/2020 1228  Last data filed at 2/27/2020 1700  Gross per 24 hour   Intake 480 ml   Output    Net 480 ml        LABORATORY RESULTS:      Lab Results   Component Value Date    HGB 8 6 (L) 02/25/2020    HGB 11 6 (L) 10/28/2015    HCT 25 4 (L) 02/25/2020    HCT 32 1 (L) 10/28/2015    WBC 8 70 02/25/2020    WBC 8 54 10/28/2015     Lab Results   Component Value Date    BUN 50 (H) 02/26/2020    BUN 34 (H) 11/03/2015     10/28/2015    K 3 8 02/26/2020    K 3 6 10/28/2015    CL 98 (L) 02/26/2020     10/28/2015    GLUCOSE 275 (H) 02/21/2020    GLUCOSE 92 10/28/2015    CREATININE 14 50 (H) 02/26/2020    CREATININE 1 90 (H) 11/03/2015     Lab Results   Component Value Date    PROTIME 12 8 02/20/2020    INR 1 02 02/20/2020        DIAGNOSTIC STUDIES:  Xr Chest Portable    Result Date: 2/21/2020  Impression: No acute cardiopulmonary disease  ET tube 5 cm above the julia  Workstation performed: [id number]     Xr Elbow 3+ Vw Left    Result Date: 2/26/2020  Impression: No evidence of fracture or dislocation  Left elbow joint effusion    In the absence of acute trauma (which would raise suspicion for occult fracture), an inflammatory arthropathy should be considered  Soft tissue swelling without soft tissue air  The examination demonstrates a significant  finding and was documented as such in [facility] for liaison and referring practitioner notification  Workstation performed: [id number]     Xr Abdomen 1 View Kub    Result Date: 2020  Impression: Unremarkable abdomen  Workstation performed: [id number]       PRECAUTIONS/SPECIAL NEEDS:  Tobacco:   Social History     Tobacco Use   Smoking Status Former Smoker    Packs/day: 0 50    Years: 12 00    Pack years: 6 00    Types: Cigarettes    Last attempt to quit: 2018    Years since quittin 0   Smokeless Tobacco Never Used   Tobacco Comment    quit 2018   , Alcohol:    Social History     Substance and Sexual Activity   Alcohol Use Not Currently    Comment: rarely   , Anticoagulation:  aspirin 81 mg orally every day and heparin, Blood Sugar Management: as per MD recommendations, Edema Management, Safety Concerns, Pain Management, IV: Type: Peripheral Location: Right Forearm Reason: Medications/IVF, Dietary Restrictions: Diabetic, Visually Impaired, Language Preference: English, Hemodialysis Schedule: PD @ HS, Seizure Precautions and Fall Precautions      MEDICATIONS:     Current Facility-Administered Medications:     acetaminophen (TYLENOL) tablet 650 mg, 650 mg, Oral, Q8H PRN, Anabel Marion, DO, 650 mg at 20 0130    aspirin chewable tablet 81 mg, 81 mg, Oral, Daily, Anabel Marion, DO, 81 mg at 20 0851    atorvastatin (LIPITOR) tablet 40 mg, 40 mg, Oral, HS, Anabel Marion, DO, 40 mg at 20 2137    calcium acetate (PHOSLO) capsule 667 mg, 667 mg, Oral, TID With Meals, Peg Hughs, DO, 667 mg at 20 0851    chlorhexidine (PERIDEX) 0 12 % oral rinse 15 mL, 15 mL, Swish & Spit, Q12H [address], Anabel Marion, DO, 15 mL at 20 0851    cholecalciferol (VITAMIN D3) tablet 2,000 Units, 2,000 Units, Oral, Daily, Linden Darío, DO, 2,000 Units at 02/28/20 0851    cinacalcet (SENSIPAR) tablet 90 mg, 90 mg, Oral, Daily, Anabel Marion, DO, 90 mg at 02/28/20 0850    cloNIDine (CATAPRES) tablet 0 1 mg, 0 1 mg, Oral, Q8H [address], Anabel Marion, DO, 0 1 mg at 02/28/20 0650    dextrose 50 % IV solution 13 mL, 13 mL, Intravenous, Once, Linden Darío, DO    dextrose 50 % IV solution 25 g, 25 g, Intravenous, PRN, Catherine Tompkins MD    diphenhydrAMINE (BENADRYL) 50 mg in sodium chloride 0 9 % 50 mL IVPB, 50 mg, Intravenous, Daily PRN, Linden Darío, DO    doxazosin (CARDURA) tablet 2 mg, 2 mg, Oral, HS, [address] Aparicio, DO, 2 mg at 02/27/20 2137    gabapentin (NEURONTIN) capsule 100 mg, 100 mg, Oral, HS, Anabel Marion, DO, 100 mg at 02/27/20 2137    gentamicin (GARAMYCIN) 0 1 % ointment 1 application, 1 application, Topical, Daily, Anabel Marion, DO, 1 application at 46/77/60 2203    heparin (porcine) subcutaneous injection 5,000 Units, 5,000 Units, Subcutaneous, Q8H [address], Anabel Marion, DO, 5,000 Units at 02/28/20 0653    hydrALAZINE (APRESOLINE) injection 20 mg, 20 mg, Intravenous, Q4H PRN, Anabel Marion, DO, 20 mg at 02/26/20 1100    hydrALAZINE (APRESOLINE) tablet 50 mg, 50 mg, Oral, TID, Anabel Marion, DO, 50 mg at 02/28/20 0851    insulin glargine (LANTUS) subcutaneous injection 14 Units 0 14 mL, 14 Units, Subcutaneous, HS, Anabel Marion, DO, 14 Units at 02/27/20 2139    insulin lispro (HumaLOG) 100 units/mL subcutaneous injection 1-5 Units, 1-5 Units, Subcutaneous, TID AC, 1 Units at 02/27/20 1648 **AND** Fingerstick Glucose (POCT), , , TID AC, Guanaco Gray MD    insulin lispro (HumaLOG) 100 units/mL subcutaneous injection 1-5 Units, 1-5 Units, Subcutaneous, HS, Guanaco Gray MD, 1 Units at 02/27/20 2140    insulin lispro (HumaLOG) 100 units/mL subcutaneous injection 4 Units, 4 Units, Subcutaneous, TID With Meals, Allie Peralta MD, 4 Units at 02/28/20 0894    labetalol (NORMODYNE) tablet 300 mg, 300 mg, Oral, Q12H [facility], Anabel Marion DO, 300 mg at 02/28/20 0851    Labetalol HCl (NORMODYNE) injection 20 mg, 20 mg, Intravenous, Q2H PRN, Anabel Marion, DO, 20 mg at 02/26/20 1205    lactobacillus acidophilus-bulgaricus [facility]) packet 1 packet, 1 packet, Oral, HS, Edyth Lesch, DO, 1 packet at 02/27/20 2153    lisinopril (ZESTRIL) tablet 40 mg, 40 mg, Oral, Daily, Anabel Schultz DO, 40 mg at 02/28/20 0851    NIFEdipine (PROCARDIA XL) 24 hr tablet 60 mg, 60 mg, Oral, BID, Madison Aparicio DO, 60 mg at 02/28/20 0850    ondansetron (ZOFRAN) injection 4 mg, 4 mg, Intravenous, Q4H PRN, Dodie Flores MD, 4 mg at 02/28/20 0857    ondansetron (ZOFRAN-ODT) dispersible tablet 4 mg, 4 mg, Oral, Q8H [facility], Delvin Riley MD, 4 mg at 02/28/20 0650    pantoprazole (PROTONIX) EC tablet 40 mg, 40 mg, Oral, Early Morning, Dodie Flores MD, 40 mg at 02/28/20 7508    torsemide (DEMADEX) tablet 100 mg, 100 mg, Oral, Daily With Lunch, Anabel Schultz DO, 100 mg at 02/27/20 1119    valproic acid (DEPAKENE) capsule 750 mg, 750 mg, Oral, Q12H [facility], Dodie Flores MD, 750 mg at 02/28/20 0850    SKIN INTEGRITY:   no erythema, no peripheral edema, rash    PRIOR LEVEL OF FUNCTION:  He lives in [facility] single family home  Karmen Salas is single and lives with their family (parents)  Self Care: Independent, Indoor Mobility: Independent, Stairs (in/outdoor): Independent and Cognition: Independent  Prior to patient's admission, patient was fully Independent with ADL's and IADL's, with use of SPC only in the community  Patient's parents provided transportation  FALLS IN THE LAST 6 MONTHS: x2    HOME ENVIRONMENT:  The living area: can live on one level  There are 2 steps to enter the home, with 13 steps to second level of home  The patient will have 24 hour supervision available upon discharge    Patient's parents are retired, and home, able to assist as needed  PREVIOUS DME:  Equipment in home (previous DME): Commode, Shower Chair, Tub Bench, Grab Bars, Rolling Walker and [address]:  Physical Therapy Occupational Therapy Speech Therapy   2/27/2020, per PT    Bed Mobility   Additional Comments Not tested  patient seated OOB upon arrival   Transfers   Sit to Stand 4  Minimal assistance   Additional items Assist x 1; Increased time required;Verbal cues   Stand to Sit 4  Minimal assistance   Additional items Assist x 1; Increased time required;Verbal cues   Additional Comments Increased time to complete, requires assistance for minimal force production into standing  Ambulation/Elevation   Gait pattern Ataxia; Short stride; Step to; Foward flexed  (Decreased bilteral heel strike, lateral sway )   Gait Assistance 4  Minimal assist  (min A with RW/ mod A without DME; + chair follow)   Additional items Assist x 1;Verbal cues   Assistive Device Rolling walker  (10 feet x 2 without DME, occasional reaches for handrail)   Distance 80 feet x 4 (w/RW & standing rest break); 10 feet x 2 without DME    Balance   Static Sitting Fair   Dynamic Sitting Fair   Static Standing Poor +   Dynamic Standing Poor +   Ambulatory Poor  (without DME)   Endurance Deficit   Endurance Deficit Yes   Endurance Deficit Description fatigue    Activity Tolerance   Activity Tolerance Patient limited by fatigue   Medical Staff Made Aware Restorative Staff   Nurse Made Aware Yes, Per RN patient is appropriate for PT evaluation   Exercises   Hip Abduction Standing;10 reps;AROM; Right;Left  (x10 each)   Marching Standing;10 reps;AROM; Bilateral  (at handrail, min A x 1 to maintain balance)   Assessment   Prognosis Good   Problem List Decreased strength;Decreased endurance; Impaired balance;Decreased mobility; Decreased cognition; Impaired judgement;Decreased safety awareness   Assessment Patient seen for physical therapy session with a focus on there-ex, balance training, gait training, activity tolerance, and education  Pt received seated OOB and agreeable to PT session  Pt motivated to participate in therapy today  Patient made improvement in ambulation distances today and requires less assistance with all functional mobility  Trial ambulation without RW, requires mod A x 1 to maintain ambulatory balance, occasionally reaches for handrail for increased stabilization  Decreased ambulation distance tolerance without use of RW  1-2 minor lateral LOB, requires min A x 1 to recover LOB  Pt continues to function below his baseline as he is normally able to ambulate household distances without DME  Tolerated standing there-ex well but requires min A x 1 to maintain standing balance  Recommend STR upon discharge once medically stable  Barriers to Discharge Decreased caregiver support   Goals   Patient Goals To walk better   STG Expiration Date 03/07/20   PT Treatment Day 3   Plan   Treatment/Interventions Functional transfer training;LE strengthening/ROM; Therapeutic exercise; Endurance training;Patient/family training;Gait training;Bed mobility   PT Frequency Other (Comment)  (3-5x/wk)   Recommendation   Recommendation Post acute IP rehab   Equipment Recommended Walker   PT - OK to Discharge Yes         Ella Bryson PT, DPT                  2/27/2020, per [place]    ADL   Where Assessed Chair   LB Dressing Assistance 4  Minimal Assistance   LB Dressing Deficit Pull up over hips; Tie shoes; Don/doff R shoe;Don/doff L shoe   LB Dressing Comments pt reports abdominal muscle cramping with bending down to attend to his shoes  Transfers   Sit to Stand 3  Moderate assistance   Additional items Assist x 1   Stand to Sit 4  Minimal assistance   Additional items Assist x 1; Increased time required   Cognition   Overall Cognitive Status Impaired   Arousal/Participation Cooperative   Attention Attends with cues to redirect   Orientation Level Oriented X4   Memory Decreased recall of precautions Following Commands Follows one step commands without difficulty   Activity Tolerance   Activity Tolerance Patient limited by fatigue   Assessment   Assessment Pt participated in occupational therapy with focus on activity tolerance, functional transfers/standing balance and tolerance for pt engagement in functional self-care task/LB self-care and pt L B self-care  Pt cleared by RN/for pt participation in occupational therapy  Pt received sitting out of bed to bedside chair and agreeable to therapy  Pt continues to require assist for functional transfers 2* pt decreased overall strength  Pt required assist for LB self-care dressing 2* pt coordination and pt with limited activity tolerance  Pt will require in-pt rehab to continue to address these above noted deficits which currently impair pt ADL and functional mob  N/A     CURRENT GAP IN FUNCTION  Prior to Admission: Functional Status: Patient was independent with mobility/ambulation, transfers, ADL's, IADL's  Estimated length of stay: 10 to 14 days    Anticipated Post-Discharge Disposition/Treatment  Disposition: Return to previous home/apartment  Outpatient Services: Physical Therapy (PT) and Occupational Therapy (OT)    BARRIERS TO DISCHARGE  Nausea, Weakness, Pain, Balance Difficulty, Fatigue, Home Accessibility, Caregiver Accessibility, Financial Resources, Equipment Needs and Resource Availability    INTERVENTIONS FOR DISCHARGE  Adaptive equipment, Patient/Family/Caregiver Education, Community Resources, Financial Assistance, Arrange DME needs, Medication Changes as per MD recommendations, Therapy exercises, Center of balance support  and Energy conservation education     REQUIRED THERAPY:  Patient will require PT and OT 90 minutes each per day, five days per week to achieve rehab goals       REQUIRED FUNCTIONAL AND MEDICAL MANAGEMENT FOR INPATIENT REHABILITATION:  Skin:  There are no pressure sores currently, rash, Pain Management: Overall pain is well controlled, Deep Vein Thrombosis (DVT) Prophylaxis:  heparin and ASA 81mg PO daily, Diabetes Management: continue sliding scale insulin, patient to do finger sticks as ordered, SLIM to continue to manage diabetes, and further IM management of additional medical conditions while on the ARC, he needs PT/OT intervention, patient/family education and training, possible Neuropsych and Nephrology consults with any other needed consults prn, nursing medication review and management of bowel/bladder function  RECOMMENDED LEVEL OF CARE:   Patient presented to [address] [place] [address] on 2/20/2020 with complaints of dizziness x2 days  Patient also states some word-finding difficulty  Blood pressure was noted to be elevated at 189/93, and a stroke alert was called  CT of the head was negative, and EKG showed NSR  Patient was with an NIHSS of 0, and not a tPA candidate  Patient was continued on ASA and statin  MRI of the brain was negative  On 2/21, a RRT was called after patient was found on the floor actively seizing  Patient was poorly responsive with alternating left/right deviated gaze  Patient was emergently intubated  Patient received 6mg IV Ativan, and was loaded with Valproic Acid  Patient required transfer to ICU, heavily sedated  Neurology was consulted, with recommendations to continue with IV Depacon and initiated vEEG monitoring  Patient transferred to [place] [address] for vEEG monitoring  Patient required a Cardene gtt to maintain SBP less than 180  Overnight 2/22, patient was extubated without difficulty  Nephrology was consulted due to patient's history of ESRD and PD management, as well as HTN medication recommendations  On 2/23, patient's Depacon level was subtherapeutic, requiring re-dosing per Neurology  On 2/24, vEEG was discontinued, as no electrographic seizures or interictal epileptiform discharges were seen   Background activities with diffuse delta activities were too slow suggesting mild to moderate diffuse cerebral dysfunction of non-specific etiology; more prominent delta activities in left hemisphere suggesting slightly worse cerebral dysfunction in left hemisphere compared to right  Per Neurology, there was strong suspicion for provoked seizure in setting of uncontrolled HTN, with concern for rebound effects of Clonidine contributing to uncontrolled HTN  Given suspicion for provoked seizure, patient wound not need to remain on AEDs  However, patient was with witnessed left facial twitching (? Myoclonic) in setting of elevated BP (176/85)  Depacon dose was reduced to 750mg BID with recommendations for patient to follow-up outpatient with Epilepsy clinic  Patient was also recommended to maintain blood pressure journal and record TID  Endocrinology was consulted regarding DM management, with insulin changes made as needed  Patient is to continue on Clonidine, Hydralazine, Procardia, Cardura and Torsemide per Nephrology recommendations  Patient is currently on a Diabetic diet, and is tolerating well  Prior to patient's admission, patient was fully Independent with ADL's and IADL's, with use of SPC only in the community  Currently, patient is Min assist with RW for gait and transfers, and Supervision for grooming, Min assist with LB ADL's  Close medical management and PM&R management is recommended at this time while patient is on the [facility]  Inpatient acute rehab is recommended for patient to maximize overall strength and mobility to Supervision/Modified Independent upon discharge to home with support of family

## 2020-02-28 NOTE — ASSESSMENT & PLAN NOTE
Lab Results   Component Value Date    HGBA1C 5 6 02/21/2020       Recent Labs     02/27/20  0646 02/27/20  1044 02/27/20  1500 02/27/20  1600   POCGLU 209* 115 131 188*       Blood Sugar Average: Last 72 hrs:  (P) 841 [id number]     With episodes of hypo and hyperglycemia this hospitalization  Patient now with lower dextrose diasylate  Some hypoglycemia today, endocrine has titrated insulin- appreciate input  Continue Lantus 14 units at night, Humalog 4 units with meals, SSI, Accu-Cheks

## 2020-02-29 PROBLEM — Z74.09 IMPAIRED MOBILITY AND ADLS: Status: ACTIVE | Noted: 2020-02-29

## 2020-02-29 PROBLEM — Z78.9 IMPAIRED MOBILITY AND ADLS: Status: ACTIVE | Noted: 2020-02-29

## 2020-02-29 PROBLEM — M79.602 LEFT ARM PAIN: Status: ACTIVE | Noted: 2020-02-29

## 2020-02-29 LAB
GLUCOSE SERPL-MCNC: 215 MG/DL (ref 65–140)
GLUCOSE SERPL-MCNC: 235 MG/DL (ref 65–140)
GLUCOSE SERPL-MCNC: 242 MG/DL (ref 65–140)
GLUCOSE SERPL-MCNC: 306 MG/DL (ref 65–140)
GLUCOSE SERPL-MCNC: 75 MG/DL (ref 65–140)
GLUCOSE SERPL-MCNC: 77 MG/DL (ref 65–140)

## 2020-02-29 PROCEDURE — 97116 GAIT TRAINING THERAPY: CPT | Performed by: PHYSICAL THERAPIST

## 2020-02-29 PROCEDURE — 97530 THERAPEUTIC ACTIVITIES: CPT | Performed by: PHYSICAL THERAPIST

## 2020-02-29 PROCEDURE — 82948 REAGENT STRIP/BLOOD GLUCOSE: CPT

## 2020-02-29 PROCEDURE — 97112 NEUROMUSCULAR REEDUCATION: CPT | Performed by: PHYSICAL THERAPIST

## 2020-02-29 PROCEDURE — 97535 SELF CARE MNGMENT TRAINING: CPT

## 2020-02-29 PROCEDURE — 97163 PT EVAL HIGH COMPLEX 45 MIN: CPT | Performed by: PHYSICAL THERAPIST

## 2020-02-29 PROCEDURE — 3E1M39Z IRRIGATION OF PERITONEAL CAVITY USING DIALYSATE, PERCUTANEOUS APPROACH: ICD-10-PCS | Performed by: PHYSICAL MEDICINE & REHABILITATION

## 2020-02-29 PROCEDURE — 99223 1ST HOSP IP/OBS HIGH 75: CPT | Performed by: INTERNAL MEDICINE

## 2020-02-29 PROCEDURE — 97166 OT EVAL MOD COMPLEX 45 MIN: CPT

## 2020-02-29 PROCEDURE — 99232 SBSQ HOSP IP/OBS MODERATE 35: CPT | Performed by: INTERNAL MEDICINE

## 2020-02-29 RX ADMIN — INSULIN LISPRO 2 UNITS: 100 INJECTION, SOLUTION INTRAVENOUS; SUBCUTANEOUS at 22:20

## 2020-02-29 RX ADMIN — HYDRALAZINE HYDROCHLORIDE 50 MG: 50 TABLET, FILM COATED ORAL at 08:48

## 2020-02-29 RX ADMIN — PANTOPRAZOLE SODIUM 40 MG: 40 TABLET, DELAYED RELEASE ORAL at 05:48

## 2020-02-29 RX ADMIN — INSULIN LISPRO 1 UNITS: 100 INJECTION, SOLUTION INTRAVENOUS; SUBCUTANEOUS at 12:58

## 2020-02-29 RX ADMIN — ONDANSETRON 4 MG: 2 INJECTION INTRAMUSCULAR; INTRAVENOUS at 08:40

## 2020-02-29 RX ADMIN — ONDANSETRON 4 MG: 4 TABLET, ORALLY DISINTEGRATING ORAL at 15:14

## 2020-02-29 RX ADMIN — HYDRALAZINE HYDROCHLORIDE 50 MG: 50 TABLET, FILM COATED ORAL at 22:17

## 2020-02-29 RX ADMIN — CALCIUM ACETATE 667 MG: 667 CAPSULE ORAL at 12:57

## 2020-02-29 RX ADMIN — INSULIN HUMAN 5 UNITS: 100 INJECTION, SOLUTION PARENTERAL at 10:37

## 2020-02-29 RX ADMIN — HEPARIN SODIUM 5000 UNITS: 5000 INJECTION INTRAVENOUS; SUBCUTANEOUS at 05:48

## 2020-02-29 RX ADMIN — NIFEDIPINE 60 MG: 60 TABLET, FILM COATED, EXTENDED RELEASE ORAL at 08:49

## 2020-02-29 RX ADMIN — CLONIDINE HYDROCHLORIDE 0.1 MG: 0.1 TABLET ORAL at 15:15

## 2020-02-29 RX ADMIN — ASPIRIN 81 MG 81 MG: 81 TABLET ORAL at 08:49

## 2020-02-29 RX ADMIN — LISINOPRIL 40 MG: 20 TABLET ORAL at 08:47

## 2020-02-29 RX ADMIN — LABETALOL HCL 300 MG: 200 TABLET, FILM COATED ORAL at 22:16

## 2020-02-29 RX ADMIN — CLONIDINE HYDROCHLORIDE 0.1 MG: 0.1 TABLET ORAL at 05:48

## 2020-02-29 RX ADMIN — ONDANSETRON 4 MG: 4 TABLET, ORALLY DISINTEGRATING ORAL at 22:16

## 2020-02-29 RX ADMIN — GENTAMICIN SULFATE 1 APPLICATION: 1 OINTMENT TOPICAL at 22:17

## 2020-02-29 RX ADMIN — VALPROIC ACID 750 MG: 250 CAPSULE, LIQUID FILLED ORAL at 08:50

## 2020-02-29 RX ADMIN — INSULIN LISPRO 2 UNITS: 100 INJECTION, SOLUTION INTRAVENOUS; SUBCUTANEOUS at 08:45

## 2020-02-29 RX ADMIN — ONDANSETRON 4 MG: 2 INJECTION INTRAMUSCULAR; INTRAVENOUS at 03:20

## 2020-02-29 RX ADMIN — INSULIN LISPRO 4 UNITS: 100 INJECTION, SOLUTION INTRAVENOUS; SUBCUTANEOUS at 08:45

## 2020-02-29 RX ADMIN — TORSEMIDE 100 MG: 20 TABLET ORAL at 13:01

## 2020-02-29 RX ADMIN — CLONIDINE HYDROCHLORIDE 0.1 MG: 0.1 TABLET ORAL at 22:17

## 2020-02-29 RX ADMIN — ONDANSETRON 4 MG: 4 TABLET, ORALLY DISINTEGRATING ORAL at 05:48

## 2020-02-29 RX ADMIN — DOXAZOSIN 2 MG: 2 TABLET ORAL at 22:17

## 2020-02-29 RX ADMIN — CALCIUM ACETATE 667 MG: 667 CAPSULE ORAL at 08:51

## 2020-02-29 RX ADMIN — GABAPENTIN 100 MG: 100 CAPSULE ORAL at 22:17

## 2020-02-29 RX ADMIN — LABETALOL HCL 300 MG: 200 TABLET, FILM COATED ORAL at 08:48

## 2020-02-29 RX ADMIN — INSULIN GLARGINE 14 UNITS: 100 INJECTION, SOLUTION SUBCUTANEOUS at 22:19

## 2020-02-29 RX ADMIN — LACTOBACILLUS ACIDOPHILUS / LACTOBACILLUS BULGARICUS 1 PACKET: 100 MILLION CFU STRENGTH GRANULES at 22:15

## 2020-02-29 RX ADMIN — HEPARIN SODIUM 5000 UNITS: 5000 INJECTION INTRAVENOUS; SUBCUTANEOUS at 22:17

## 2020-02-29 RX ADMIN — HEPARIN SODIUM 5000 UNITS: 5000 INJECTION INTRAVENOUS; SUBCUTANEOUS at 15:14

## 2020-02-29 RX ADMIN — VALPROIC ACID 750 MG: 250 CAPSULE, LIQUID FILLED ORAL at 22:14

## 2020-02-29 RX ADMIN — ATORVASTATIN CALCIUM 40 MG: 40 TABLET, FILM COATED ORAL at 22:17

## 2020-02-29 RX ADMIN — MELATONIN 2000 UNITS: at 08:47

## 2020-02-29 RX ADMIN — CHLORHEXIDINE GLUCONATE 0.12% ORAL RINSE 15 ML: 1.2 LIQUID ORAL at 08:47

## 2020-02-29 RX ADMIN — INSULIN LISPRO 4 UNITS: 100 INJECTION, SOLUTION INTRAVENOUS; SUBCUTANEOUS at 17:27

## 2020-02-29 RX ADMIN — CINACALCET HYDROCHLORIDE 90 MG: 30 TABLET, FILM COATED ORAL at 08:59

## 2020-02-29 RX ADMIN — NIFEDIPINE 60 MG: 60 TABLET, FILM COATED, EXTENDED RELEASE ORAL at 17:24

## 2020-02-29 RX ADMIN — CALCIUM ACETATE 667 MG: 667 CAPSULE ORAL at 17:25

## 2020-02-29 RX ADMIN — INSULIN LISPRO 4 UNITS: 100 INJECTION, SOLUTION INTRAVENOUS; SUBCUTANEOUS at 12:58

## 2020-02-29 RX ADMIN — CHLORHEXIDINE GLUCONATE 0.12% ORAL RINSE 15 ML: 1.2 LIQUID ORAL at 22:14

## 2020-02-29 RX ADMIN — HYDRALAZINE HYDROCHLORIDE 50 MG: 50 TABLET, FILM COATED ORAL at 17:24

## 2020-02-29 NOTE — ASSESSMENT & PLAN NOTE
· Acute comprehensive interdisciplinary inpatient rehabilitation including PT, OT, RN, CM, SW, dietary, psychology, etc   · Goal: modified independent

## 2020-02-29 NOTE — PROGRESS NOTES
PT EVAL       02/29/20 6500   Patient Data   Rehab Impairment Other Neurologic Disorders   Etiologic Diagnosis New Onset Seizures due to Uncontrolled Hypertension   Date of Onset 02/20/20   Home Setup   Type of Home Multi Level   Method of Entry Stairs   Number of Stairs 2  (no handrails)   Number of Stairs in Home 12   In Home Hand Rail Bilateral   Available Equipment Rollator; Shower Chair;Single [employer]   Prior Level of Function   Indoor-Mobility (Ambulation) 3  Independent - Patient completed the activities by him/herself, with or without an assistive device, with no assistance from a helper  Stairs 3  Independent - Patient completed the activities by him/herself, with or without an assistive device, with no assistance from a helper  Prior Device Used   [facility])   Patient Preference   Nickname (Patient Preference) Bill    Restrictions/Precautions   Precautions Fall Risk;Bed/chair alarms; Seizure;Supervision on toilet/commode   Pain Assessment   Pain Assessment No/denies pain   Pain Score No Pain   Transfer Bed/Chair/Wheelchair   Limitations Noted In Balance; Coordination; Endurance;Problem Solving;LE Strength   Findings practiced w/ RW and SPC   Type of Assistance Needed Physical assistance   Amount of Physical Assistance Provided 25%-49%   Chair/Bed-to-Chair Transfer CARE Score 3   Roll Left and Right   Type of Assistance Needed Independent   Amount of Physical Assistance Provided No physical assistance   Roll Left and Right CARE Score 6   Sit to Lying   Type of Assistance Needed Supervision   Amount of Physical Assistance Provided No physical assistance   Sit to Lying CARE Score 4   Lying to Sitting on Side of Bed   Type of Assistance Needed Supervision   Amount of Physical Assistance Provided No physical assistance   Lying to Sitting on Side of Bed CARE Score 4   Sit to Stand   Type of Assistance Needed Physical assistance   Amount of Physical Assistance Provided Less than 25%   Sit to Stand CARE Score 3   Car Transfer   Comment not assessed due to fatigue   Reason if not Attempted Safety concerns   Car Transfer CARE Score 88   Ambulation   Primary Mode of Locomotion Prior to Admission Walk   Distance Walked (feet) 150 ft   Assist Device Walker;Cane;Hand Hold   Gait Pattern Ataxic; Inconsistant Marcelle; Slow Marcelle;Decreased foot clearance; Improper weight shift   Limitations Noted In Balance; Coordination; Endurance; Heel Strike;Speed;Strength;Swing   Provided Assistance with: Balance   Walk Assist Level Close Supervision;Minimum Assist;Moderate Assist   Findings CS w/ RW , min A w/ SPC, mod A without AD   Walk 10 Feet   Type of Assistance Needed Physical assistance   Amount of Physical Assistance Provided 50%-74%   Comment no AD   Walk 10 Feet CARE Score 2   Walk 50 Feet with Two Turns   Type of Assistance Needed Physical assistance   Amount of Physical Assistance Provided 50%-74%   Comment no AD   Walk 50 Feet with Two Turns CARE Score 2   Walk 150 Feet   Type of Assistance Needed Physical assistance   Amount of Physical Assistance Provided 50%-74%   Comment no AD   Walk 150 Feet CARE Score 2   Walking 10 Feet on Uneven Surfaces   Reason if not Attempted Safety concerns   Walking 10 Feet on Uneven Surfaces CARE Score 88   Wheel 50 Feet with Two Turns   Reason if not Attempted Activity not applicable   Wheel 50 Feet with Two Turns CARE Score 9   Wheel 150 Feet   Reason if not Attempted Activity not applicable   Wheel 221 Feet CARE Score 9   Curb or Single Stair   Style negotiated Single stair   Type of Assistance Needed Physical assistance   Amount of Physical Assistance Provided 25%-49%   1 Step (Curb) CARE Score 3   4 Steps   Type of Assistance Needed Physical assistance   Amount of Physical Assistance Provided 25%-49%   4 Steps CARE Score 3   12 Steps   Comment fatigue    Reason if not Attempted Safety concerns   12 Steps CARE Score 88   Stairs   Type [place] Steps   # of Steps 6   Assist Devices Bilateral Rail;Single Rail;Cane   Findings initially performed w/ BHR's and min A , also practiced 1 HR + SPC w/ mod A    RLE Assessment   RLE Assessment X   Strength RLE   R Ankle Dorsiflexion 3/5   LLE Assessment   LLE Assessment X   Strength LLE   L Ankle Dorsiflexion 3/5   Coordination   Movements are Fluid and Coordinated 0   Coordination and Movement Description impaired via finger to nose and alternating toe taps    Sensation   Light Touch Partial deficits in the RLE;Partial deficits in the LLE  (neuropathy)   Cognition   Arousal/Participation Cooperative   Orientation Level Oriented X4   Vision   Occulomotor Saccades;Tracking   Vision Comments impaired smooth pursuits w/ saccadic tracking and end gaze nystagmus observed , impaired convergence note L eye delayed and not as smooth as R eye  Pt reports he had strabismus surgery following CVA in 2018 to correct vision deficits  He wears glasses  Objective Measure   PT Measure(s) [facility] 29/56 indicating HIGH fall risk   PT Findings BP end of session 129/81 mmHg   Therapeutic Exercise   Therapeutic Exercise/Activity ankle DF w/ orange resistance band 10 reps each foot   Discharge Information   Patient's Discharge Plan home w/ family support    Patient's Rehab Expectations return home, improve balance    Barriers to Discharge Home Unsafe Home Setup; Decreased Strength;Decreased Endurance; Safety Considerations   Impressions Pt is 36 y/o male admitted to [facility] s/p seizure related to uncontrolled HTN  PMH includes DM type 1, ESRD on peritoneal dialysis, hx of CVA in 2015 and 2018  He did go to outpatient PT following most recent CVA but he has resultant balance and coordination impairments  He also has neuropathy BLE's which is contributing to balance impairment and fall risk  He reports chronic low grade dizziness resultant from prior CVA as well  He was independent PTA, however was furniture walking in the home and experiencing near falls   He uses SPC in the community  He owns rollator but doesn't use it  Overall LE strength grossly 5/5 except dorsiflexion bilaterally 3/5, leading to foot slap during gait more prominent on the left  Callejas score indicates high fall risk  Pt requires mod A with no AD, min A with SPC, and CS with RW  He has 2 steps to enter his home with no handrails and he was unable to perform safely today, required use of at least 1 handrail and mod A from therapist  Parents are retired and will be home with him 24/7  Pt very fatigued by end of session  Educated on therapy schedule, use of alarms for safety, and overall D/C planning  He verbalized understanding of use of call bell  Estimated LOS 2 weeks to achieve supervision level using SPC  He requires skilled PT to address these impairments and maximize safety with all functional mobility      PT Therapy Minutes   PT Time In 1330   PT Time Out 1500   PT Total Time (minutes) 90   PT Mode of treatment - Individual (minutes) 90   PT Mode of treatment - Concurrent (minutes) 0   PT Mode of treatment - Group (minutes) 0   PT Mode of treatment - Co-treat (minutes) 0   PT Mode of Treatment - Total time(minutes) 90 minutes   PT Cumulative Minutes 90   Cumulative Minutes   Cumulative therapy minutes 180

## 2020-02-29 NOTE — PROGRESS NOTES
OT LONG TERM GOALS       02/29/20 0957   Rehab Team Goals   ADL Team Goal Patient will require supervision with ADLs with least restrictive device upon completion of rehab program   Rehab Team Interventions   OT Interventions Self Care;Home Management; Therapeutic Exercise;Community Reintegration;Cognitive Reintegration;Cognitive Retraining;Energy Conservation;Patient/Family Education   Eating Goal   Eating Goal 06  Independent - Patient completes the activity by him/herself with no assistance from a helper  Status Ongoing; Target goal - two weeks  (10-14 days)   Grooming Goal   Oral Hygiene Goal 04  Supervision or touching assistance- [place] provides VERBAL CUES or supervision throughout activity  Task Wash/Dry Face;Wash/Dry Hands;Brush Teeth   Environment Stand at [employer]   Status Ongoing; Target goal - two weeks  (10-14 days)   Intervention Assistive Device;Balance Work;Neuromuscular Education; Therapeutic Exercise; Tolerance Work   Tub/Shower Transfer Goal   Method Tub  (SUPERVISION)   Assist Device Seat with Back   Status Ongoing; Target goal - two weeks  (10-14)   Interventions ADL Training;Assistive Device; Neuromuscular Education   Bathing Goal   Shower/bathe self Goal 04  Supervision or touching assistance- [place] provides VERBAL CUES or supervision throughout activity  Environment Seated;Standing;Sponge Bath   Status Ongoing; Target goal - two weeks  (10-14 days)   Intervention ADL Training;Assistive Device; Neuromuscular Education; Therapeutic Exercise   Upper Body Dressing Goal   Upper body dressing Goal 04  Supervision or touching assistance- [place] provides VERBAL CUES or supervision throughout activity  Task Upper Body   Environment Seated;Standing   Status Ongoing; Target goal - two weeks  (10-14 days)   Intervention Assistive Device;Balance Work;Neuromuscular Education; Therapeutic Exercise; Tolerance Work   Lower Body Dressing Goal   Lower body dressing Goal 04   Supervision or touching assistance- [place] provides VERBAL CUES or supervision throughout activity  Putting on/taking off footwear Goal 04  Supervision or touching assistance- [place] provides VERBAL CUES or supervision throughout activity  Task Lower Body   Status Ongoing; Target goal - two weeks  (10-14 days)   Intervention Assistive Device;Balance Work;Neuromuscular Education; Therapeutic Exercise; Tolerance Work   Toileting Transfer Goal   Toilet transfer Goal 04  Supervision or touching assistance- [place] provides VERBAL CUES or supervision throughout activity  Status Ongoing; Target goal - two weeks  (10-14 days)   Intervention ADL Training;Balance Work;Assistive Device   Toileting Goal   Toileting hygiene Goal 04  Supervision or touching assistance- [place] provides VERBAL CUES or supervision throughout activity  Task Pants Up;Pants Down;Hygiene   Status Ongoing; Target goal - two weeks  (10-14 days)   Intervention ADL Training;Balance Work;Assistive Device   Expression Goal   Expression Assist Level Moderate [place]   Status Ongoing; Target goal - two weeks  (10-14 days)   Social Interaction Goal   Social Interaction Assist Level Moderate [place]   Status Target goal - two weeks  (10-14 days)

## 2020-02-29 NOTE — H&P
PHYSICAL MEDICINE AND REHABILITATION H&P/ADMISSION NOTE  Kisha Mckeon 37 y o  male MRN: [id number]  Unit/Bed#: -01 Encounter: [id number]     Rehab Diagnosis: Impairment of mobility, safety and Activities of Daily Living (ADLs) due to Neurologic Conditions:  03 9  Other Neurologic Disorder Seizures    History of Present Illness:   Fritz Bermudez is a 37 y o  male who presented to the [facility] with status epilepticus on admission  Given ativan, versed and depacon on admission with no break in seizures  Resulted in patient being intubated and started on propofol drip  It was believed seizures were secondary to uncontrolled HTN  Patient was started on video EEG  Seizures did resolve, and patient able to be extubated  Accepted to [facility] on 2/28/20  Subjective: patient seen and examined at bedside  Denies any CP or SOB  Reports feeling stronger every day  Mother also at bedside  He denies any nausea emesis currently  Does c/o left arm pain, particularly to elbow  Review of Systems: A 10-point review of systems was performed  Negative except as listed above  Plan:     * Impaired mobility and ADLs  Assessment & Plan  · Acute comprehensive interdisciplinary inpatient rehabilitation including PT, OT, RN, CM, SW, dietary, psychology, etc   · Goal: modified independent    Left arm pain  Assessment & Plan  · Imaging reveals no acute fracture, but patient noted to have joint effusion, as well as "Significant joint space narrowing or bony erosion   Olecranon enthesopathy noted"  · Will require orthopedic f/u    Peripheral polyneuropathy  Assessment & Plan  · Continue gabapentin    Diabetic gastroparesis ([address] )  Assessment & Plan  · Per patient and family, improved with smaller, frequent meals    Dyslipidemia  Assessment & Plan  · Continue statin    Peritoneal dialysis catheter in place [facility])  Assessment & Plan  Results from last 7 days   Lab Units 02/26/20  0500 02/25/20  6253 02/24/20  0601   CREATININE mg/dL 14 50* 14 50* 15 40*     · Continue monitoring kidney function via intermittent BMP  · Avoid nephrotoxic meds/NSAIDs  · Patient performs PD at home  · Nephrology team following, rafael negron      Anemia  Assessment & Plan  Results from last 7 days   Lab Units 02/25/20  0759 02/24/20  0601 02/23/20  0437   HEMOGLOBIN g/dL 8 6* 9 1* 9 5*     · Monitor CBC intermittently  · Transfuse for Hgb <7      Renovascular hypertension  Assessment & Plan  Temp:  [97 9 °F (36 6 °C)-98 2 °F (36 8 °C)] 98 1 °F (36 7 °C)  HR:  [74-77] 77  Resp:  [16-20] 20  BP: (137-157)/(77-89) 157/89    · IM/nephrology service managing anti-hypertensives, adjusting as needed    Type 1 diabetes mellitus with hypoglycemia [facility])  Assessment & Plan    Recent Labs     02/28/20  2103 02/29/20  0633 02/29/20  0934 02/29/20  1106   POCGLU 126 242* 306* 215*     · Last A1C: 5 6  · Continue diabetic diet  · Continue SSI  · On Lantus, Humalog   · IM service monitoring blood sugars, adjusting regimen as needed  dvt ppx: heparin, scds   Drug regimen reviewed, all potential adverse effects identified and addressed:    Scheduled Meds:    Current Facility-Administered Medications:  acetaminophen 650 mg Oral Q8H PRN Mateo Vincent MD   aspirin 81 mg Oral Daily Mateo Vincent MD   atorvastatin 40 mg Oral HS Mateo Vincent MD   calcium acetate 667 mg Oral TID With Meals Mateo Vincent MD   chlorhexidine 15 mL Swish & Spit Q12H [address] Bautista Price MD   cholecalciferol 2,000 Units Oral Daily Bautista Price MD   cinacalcet 90 mg Oral Daily Mateo Vincent MD   cloNIDine 0 1 mg Oral Q8H [address] Mateo Vincent MD   dextrose 25 g Intravenous PRN Mateo Vincent MD   diphenhydrAMINE (BENADRYL) IVPB 50 mg Intravenous Daily PRN Mateo Vincent MD   doxazosin 2 mg Oral HS Mateo Vincent MD   gabapentin 100 mg Oral HS Mateo Vincent MD   gentamicin 1 application Topical Daily Mateo Vincent MD   heparin (porcine) 5,000 Units Subcutaneous Q8H [address] [employer] MD Jesi   hydrALAZINE 20 mg Intravenous Q4H PRN Mateo Vincent MD   hydrALAZINE 50 mg Oral TID Mateo Vincent MD   insulin glargine 14 Units Subcutaneous HS Mateo Vincent MD   insulin lispro 1-5 Units Subcutaneous TID AC Mateo Vincent MD   insulin lispro 1-5 Units Subcutaneous HS Mateo Vincent MD   insulin lispro 4 Units Subcutaneous TID With Meals Matoe Vincent MD   labetalol 300 mg Oral Q12H [address] Mateo Vincent MD   Labetalol HCl 20 mg Intravenous Q2H PRN Mateo Vincent MD   lactobacillus acidophilus-bulgaricus 1 packet Oral HS Mateo Vincent MD   lisinopril 40 mg Oral Daily Mateo Vincent MD   NIFEdipine 60 mg Oral BID Mateo Vincent MD   ondansetron 4 mg Intravenous Q4H PRN Mateo Vincent MD   ondansetron 4 mg Oral Q8H [address] Mateo Vincent MD   pantoprazole 40 mg Oral Early Morning Mateo Vincent MD   torsemide 100 mg Oral Daily With Lunch Katherin Rico MD   valproic acid 750 mg Oral Q12H [address] Katherin Rico MD        Restrictions include:  none Fall precautions      Functional History - Prior to Admission:      Functional Status: Patient was independent with mobility/ambulation, transfers, ADL's, IADL's  Functional Status Upon Admission to [facility]:  Mobility: min assit  Transfers: min assist  ADLs: min assist     Physical Exam:  Temp:  [97 9 °F (36 6 °C)-98 2 °F (36 8 °C)] 98 1 °F (36 7 °C)  HR:  [74-77] 77  Resp:  [16-20] 20  BP: (137-157)/(77-89) 157/89  SpO2:  [96 %-98 %] 96 %    General: alert, no apparent distress, cooperative, comfortable and very pleasant  HEENT:  Head: Normocephalic, no lesions, without obvious abnormality  Eye: Normal external eye, conjunctiva, lids cornea, KAEL  Ears: normal external ears  Nose: Normal external nose, mucus membranes and septum  LUNGS:  no abnormal respiratory pattern, no retractions noted, non-labored breathing   ABDOMEN:  non-tender, but mildly distended   patinet having gas/BMs  EXTREMITIES: extremities normal, warm and well-perfused; no cyanosis, clubbing, or edema  NEURO:   clear speech, following commands, oriented ot person, place, time  PSYCH:  Affect: euthymic  MMT:   Strength:   Right  Left  Site  Right  Left  Site    5 4 S Ab: Shoulder Abductors  5  5  HF: Hip Flexors    5 4 EF: Elbow Flexors  5  5 KF: Knee Flexors    5  4 EE: Elbow Extensors  5  5  KE: Knee Extensors    5  5  WE: Wrist Extensors  5  5  DR: Dorsi Flexors    5  5  FF: Finger Flexors  5  5  PF: Plantar Flexors    5  5  HI: Hand Intrinsics  5  5  EHL: Extensor Hallucis Longus     Laboratory:    Results from last 7 days   Lab Units 02/25/20  0759 02/24/20  0601 02/23/20  0437   HEMOGLOBIN g/dL 8 6* 9 1* 9 5*   HEMATOCRIT % 25 4* 28 1* 29 2*   WBC Thousand/uL 8 70 9 90 10 91*     Results from last 7 days   Lab Units 02/26/20  0500 02/25/20  0759 02/24/20  0601   BUN mg/dL 50* 47* 47*   SODIUM mmol/L 134* 134* 142   POTASSIUM mmol/L 3 8 4 1 3 7   CHLORIDE mmol/L 98* 99* 105   CREATININE mg/dL 14 50* 14 50* 15 40*            Wt Readings from Last 1 Encounters:   02/28/20 100 kg (221 lb 5 5 oz)     Estimated body mass index is 30 87 kg/m² as calculated from the following:    Height as of this encounter: 5' 11" (1 803 m)  Weight as of this encounter: 100 kg (221 lb 5 5 oz)  Imaging: reviewed     Rehabilitation Prognosis: good     Tolerance for three hours of therapy a day: good     Family/Patient Goals:  Patient/family's goals: Return to previous home/apartment  Patient will receive PT and OT 90 minutes each per day, five days per week to achieve rehab goals or participate in 900 minutes of therapy within a 7 day week period  Mobility Goals: Modified [place]  Transfer Goals: Modified [place]  Activities of Daily Living (ADLs) Goals: Modified [place]    Discharge Planning:  Rehabilitation and discharge goals discussed with the patient and/or family      Case Managment and Social Work to review patient/family resources and to coordinate Discharge Planning  Estimated length of stay: 10 to 14 days    Patient and Family Education and Training:  Rehabilitation and discharge goals discussed with the patient and/or family  Patient/family education/training needs to be discussed in weekly team meeting  Equipment/DME needs: Therapy teams to assess and evaluate for additional equipment/DME needs throughout rehabilitation stay    Past Medical History:   Past Surgical History:   Family History:   Social history:   Past Medical History:   Diagnosis Date    [facility] kidney [facility] )     Anxiety     Cerebellar stroke side undetermined 2015 2015,1/2018    Diabetes type [facility], [facility] )     IDDM    Diarrhea     Gastroparesis     History of shingles 2010    History of transfusion 02/2018    Hypertension     Muscle weakness     general unsteadiness    Retinopathy     Past Surgical History:   Procedure Laterality Date    CARDIAC LOOP RECORDER  05/2018    EGD      EYE SURGERY      PERITONEAL CATHETER INSERTION N/A 8/27/2018    Procedure: UNROOF PD CATHETER;  Surgeon: Denice Vaca DO;  [facility];  Service: General    ID ESOPHAGOGASTRODUODENOSCOPY TRANSORAL DIAGNOSTIC N/A 4/18/2019    Procedure: ESOPHAGOGASTRODUODENOSCOPY (EGD); Surgeon: Eugenia Gutierrez MD;  Location: [facility];   Service: Gastroenterology    ID LAP INSERTION TUNNELED INTRAPERITONEAL CATHETER N/A 8/6/2018    Procedure: LAPAROSCOPIC PD CATHETER PLACEMENT;  Surgeon: Denice Vaca DO;  Location[facility];  Service: General    TONSILLECTOMY       Family History   Problem Relation Age of Onset   Edwin Bones Breast cancer Mother     Hypertension Mother     Hyperlipidemia Father     Hypertension Father     Leukemia Maternal Grandmother     Hyperlipidemia Maternal Grandfather     Hypertension Maternal Grandfather     Hyperlipidemia Paternal Grandmother     Hypertension Paternal Grandmother     Heart disease Paternal Grandfather cardiac disorder    Diabetes Paternal Grandfather       Social History     Socioeconomic History    Marital status: Single     Spouse name: None    Number of children: None    Years of education: None    Highest education level: None   Occupational History    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   Social Needs    Financial resource strain: None    Food insecurity:     Worry: None     Inability: None    Transportation needs:     Medical: None     Non-medical: None   Tobacco Use    Smoking status: Former Smoker     Packs/day: 0 50     Years: 12 00     Pack years: 6 00     Types: Cigarettes     Last attempt to quit: 2018     Years since quittin 0    Smokeless tobacco: Never Used    Tobacco comment: quit 2018   Substance and Sexual Activity    Alcohol use: Not Currently     Comment: rarely    Drug use: Yes     Frequency: 5 0 times per week     Types: Marijuana     Comment: medical    Sexual activity: None   Lifestyle    Physical activity:     Days per week: None     Minutes per session: None    Stress: None   Relationships    Social connections:     Talks on phone: None     Gets together: None     Attends [employer] service: None     Active member of club or organization: None     Attends meetings of clubs or organizations: None     Relationship status: None    Intimate partner violence:     Fear of current or ex partner: None     Emotionally abused: None     Physically abused: None     Forced sexual activity: None   Other Topics Concern    None   Social History Narrative    Caffeine use    single          Current Medical Diagnosis Allergies   Patient Active Problem List   Diagnosis    Type 1 diabetes mellitus with hypoglycemia ([facility] )    History of lacunar cerebrovascular accident (CVA)    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,      Binocular visual disturbance    Vitamin D deficiency    Homozygous MTHFR mutation [id number] ([facility] )    End-stage renal disease on peritoneal dialysis ([facility] )    Persistent proteinuria    Bilateral leg edema    Ataxia    Left atrial dilation    Renovascular hypertension    Anemia    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 ([facility] )    Peritoneal dialysis catheter in place ([place] [address] )    Dyslipidemia    Strabismus    Diarrhea    Current moderate episode of major depressive disorder without prior episode ([place] [address] )    Environmental and seasonal allergies    Pruritus    Obesity (BMI 30 0-34  9)    Diabetic gastroparesis (HCC)    Peripheral polyneuropathy    Elevated TSH    Incidental lung nodule, > 3mm and < 8mm    Sepsis (HCC)    Vertigo    Impaired mobility and ADLs    Left arm pain    Allergies   Allergen Reactions    Sulfa Antibiotics Rash           Medical Necessity Criteria for [facility] Admission: Anemia, with the following plan: monitor H/H, Hypertension, Bowel/Bladder Management, Seizures and CKD on PD  In addition, the preadmission screen, post-admission physical evaluation, overall plan of care and admissions order demonstrate a reasonable expectation that the following criteria were met at the time of admission to the [facility]  1  The patient requires active and ongoing therapeutic intervention of multiple therapy disciplines (physical therapy, occupational therapy, speech-language pathology, or prosthetics/orthotics), one of which is physical or occupational therapy  2  Patient requires an intensive rehabilitation therapy program, as defined in Chapter 1, section 110 2 2 of the CMS Medicare Policy Manual  This intensive rehabilitation therapy program will consist of at least 3 hours of therapy per day at least 5 days per week or at least 15 hours of intensive rehabilitation therapy within a 7 consecutive day period, beginning with the date of admission to the [facility]      3  The patient is reasonably expected to actively participate in, and benefit significantly from, the intensive rehabilitation therapy program as defined in Chapter 1, section 110 2 2 of the CMS Medicare Policy Manual at this time of admission to the [facility]  He can reasonably be expected to make measurable improvement (that will be of practical value to improve the patients functional capacity or adaptation to impairments) as a result of the rehabilitation treatment, as defined in section 110 3, and such improvement can be expected to be made within the prescribed period of time  As noted in the CMS Medicare Policy Manual, the patient need not be expected to achieve complete independence in the domain of self-care nor be expected to return to his or her prior level of functioning in order to meet this standard  4  The patient must require physician supervision by a rehabilitation physician  As such, a rehabilitation physician will conduct face-to-face visits with the patient at least 3 days per week throughout the patients stay in the [facility] to assess the patient both medically and functionally, as well as to modify the course of treatment as needed to maximize the patients capacity to benefit from the rehabilitation process  5  The patient requires an intensive and coordinated interdisciplinary approach to providing rehabilitation, as defined in Chapter 1, section 110 2 5 of the CMS Medicare Policy Manual  This will be achieved through periodic team conferences, conducted at least once in a 7-day period, and comprising of an interdisciplinary team of medical professionals consisting of: a rehabilitation physician, registered nurse,  and/or , and a licensed/certified therapist from each therapy discipline involved in treating the patient  Changes Since Pre-admission Assessment: None -This patient's participation in rehab continues to be reasonable, necessary and appropriate      CMS Required Post-Admission Physician Evaluation Elements  History and Physical, including medical history, functional history and active comorbidities as in above text      PostAdmission Physician Evaluation:  The patient has the potential to make improvement and is in need of physical, occupational, and/or therapy services  The patient may also need nutritional services  Given the patient's complex medical condition and risk of further medical complications, rehabilitative services cannot be safely provided at a lower level of care, such as a skilled nursing facility  I have reviewed the patient's functional and medical status at the time of the preadmission screening and they are the same as on the day of this admission  I acknowledge that I have personally performed a full physical examination on this patient within 24 hours of admission  The patient and/or family demonstrated understanding the rehabilitation program and the discharge process after we discussed them  Agree in entirety: yes  Minor adaptions: none    Major changes: none     Alayna Hernandez MD  Physical Medicine and Rehabilitation    ** Please Note: Fluency Direct voice to text software may have been used in the creation of this document   **

## 2020-02-29 NOTE — PROGRESS NOTES
Internal Medicine Progress Note  Patient: Hilda Fiore  Age/sex: 37 y o  male  Medical Record #: [id number]      ASSESSMENT/PLAN: (Interval History)  Hilda Fiore is seen and examined and management for following issues:    New seizures  · Had status epilepticus on admission  · Per Neurology strong suspicion for provoked seizure in the setting of uncontrolled HTN  · Continue VPA  · Outpt follow-up with Epilepsy team      HTN  · Stable  · Continue current medications  · Nephrology was managing inpatient      DM type 1  · HA1C 5 6  · Continue insulin as ordered  · Continue SSI and accuchecks  Adjust medications as needed  · Endocrinology following  · BS this a m  306, pt concerned requesting coverage  · Non compliant with diet, family bringing outside food to bedside     ESRD on peritoneal dialysis  · Nephrology to manage      Diabetic gastroparesis  · Suspected etiology of nausea and emesis  · Continue Protonix and ATC Zofran  · Outpt GI follow-up      History of cerebellar stroke  · Continue ASA 81mg daily  · Continue risk factor modification    The above assessment and plan was reviewed and updated as determined by my evaluation of the patient on 2/29/2020      Labs:   Results from last 7 days   Lab Units 02/25/20  0759 02/24/20  0601   WBC Thousand/uL 8 70 9 90   HEMOGLOBIN g/dL 8 6* 9 1*   HEMATOCRIT % 25 4* 28 1*   PLATELETS Thousands/uL 249 242     Results from last 7 days   Lab Units 02/26/20  0500 02/25/20  0759   SODIUM mmol/L 134* 134*   POTASSIUM mmol/L 3 8 4 1   CHLORIDE mmol/L 98* 99*   CO2 mmol/L 22 22   BUN mg/dL 50* 47*   CREATININE mg/dL 14 50* 14 50*   CALCIUM mg/dL 8 9 8 8             Results from last 7 days   Lab Units 02/29/20  0934 02/29/20  0633 02/28/20  2103   POC GLUCOSE mg/dl 306* 242* 126       Review of Scheduled Meds:    Current Facility-Administered Medications:  acetaminophen 650 mg Oral Q8H PRN Blanca Singer MD   aspirin 81 mg Oral Daily Blanca Singer MD atorvastatin 40 mg Oral HS Mateo Vincent MD   calcium acetate 667 mg Oral TID With Meals David Dillon MD   chlorhexidine 15 mL Swish & Spit Q12H [address] David Dillon MD   cholecalciferol 2,000 Units Oral Daily David Dillon MD   cinacalcet 90 mg Oral Daily David Dillon MD   cloNIDine 0 1 mg Oral Q8H [address] Mateo Vincent MD   dextrose 25 g Intravenous PRN Mateo Vincent MD   diphenhydrAMINE (BENADRYL) IVPB 50 mg Intravenous Daily PRN Mateo Vincent MD   doxazosin 2 mg Oral HS Mateo Vincent MD   gabapentin 100 mg Oral HS David Dillon MD   gentamicin 1 application Topical Daily Mateo Vincent MD   heparin (porcine) 5,000 Units Subcutaneous Q8H [address] Mateo Vincent MD   hydrALAZINE 20 mg Intravenous Q4H PRN Mateo Vincent MD   hydrALAZINE 50 mg Oral TID David Dillon MD   insulin glargine 14 Units Subcutaneous HS Mateo Vincent MD   insulin lispro 1-5 Units Subcutaneous TID AC Mateo Vincent MD   insulin lispro 1-5 Units Subcutaneous HS Mateo Vincent MD   insulin lispro 4 Units Subcutaneous TID With Meals Mateo Vincent MD   insulin regular 5 Units Subcutaneous Once DEISI Santos   labetalol 300 mg Oral Q12H [address] Mateo Vincent MD   Labetalol HCl 20 mg Intravenous Q2H PRN Mateo Vincent MD   lactobacillus acidophilus-bulgaricus 1 packet Oral HS Mateo Vincent MD   lisinopril 40 mg Oral Daily Mateo Vincent MD   NIFEdipine 60 mg Oral BID Mateo Vincent MD   ondansetron 4 mg Intravenous Q4H PRN Mateo Vincent MD   ondansetron 4 mg Oral Q8H [address] Mateo Vincent MD   pantoprazole 40 mg Oral Early Morning Mateo Vincent MD   torsemide 100 mg Oral Daily With Eddie Hackett MD   valproic acid 750 mg Oral Q12H [address] [employer] MD Jesi       Subjective/ HPI: Patient seen and examined  Patients overnight issues or events were reviewed with nursing or staff during rounds or morning huddle session   New or overnight issues include the following:     Pt without any further seizures, overnight had some issues with peritoneal dialysis  BS this a m  Elevated he believes it is secondary to fluid used for dialysis  He is somewhat non compliant with DM diet as he had yogurt parfait from [employer], 2 blueberry muffins and iced coffee from [employer]  His BS this am 306 however he has not eaten anything yet  Will cover one time and allow endocrine to continue management  ROS:   A 10 point ROS was performed; negative except as noted above  Imaging:     No orders to display       *Labs /Radiology studiesReviewed  *Medications Reviewed and reconciled as needed  *Please refer to order section for additional ordered labs studies  *Case discussed with primary attending during morning huddle case rounds    Physical Examination:  Vitals:   Vitals:    02/28/20 1422 02/28/20 1940 02/29/20 0511   BP: 141/77 137/86 157/89   BP Location: Right arm  Right arm   Pulse: 76 74 77   Resp: 18 16 20   Temp: 97 9 °F (36 6 °C) 98 2 °F (36 8 °C) 98 1 °F (36 7 °C)   TempSrc: Oral Oral Oral   SpO2: 98% 97% 96%   Weight:  100 kg (221 lb 5 5 oz)    Height:  5' 11" (1 803 m)        General Appearance: no distress, conversive  HEENT: PERRLA, conjuctiva normal; oropharynx clear; mucous membranes moist;   Neck:  Supple, no lymphadenopathy or thyromegaly  Lungs: CTA, normal respiratory effort, no retractions, expiratory effort normal  CV: regular rate and rhythm , PMI normal   ABD: soft non tender, +obese +BS x4  EXT: DP pulses intact, trace b/l LE edema  Skin: normal turgor, normal texture, no rash  Psych: affect normal, mood normal  Neuro: AAOx3      The above physical exam was reviewed and updated as determined by my evaluation of the patient on 2/29/2020      Invasive Devices     Peripheral Intravenous Line            Peripheral IV 02/26/20 Right Forearm 2 days                   VTE Pharmacologic Prophylaxis: Sequential compression device (Venodyne)   Code Status: Level 1 - Full Code  Current Length of Stay: 1 day(s)      Total time spent:  30 minutes with more than 50% spent counseling/coordinating care  Counseling includes discussion with patient re: progress  and discussion with patient of his/her current medical state/information  Coordination of patient's care was performed in conjunction with primary service  Time invested included review of patient's labs, vitals, and management of their comorbidities with continued monitoring  In addition, this patient was discussed with medical team including physician and advanced extenders  The care of the patient was extensively discussed and appropriate treatment plan was formulated unique for this patient  ** Please Note:  voice to text software may have been used in the creation of this document   Although proof errors in transcription or interpretation are a potential of such software**

## 2020-02-29 NOTE — ASSESSMENT & PLAN NOTE
Temp:  [97 9 °F (36 6 °C)-98 4 °F (36 9 °C)] 97 9 °F (36 6 °C)  HR:  [76-82] 78  Resp:  [18] 18  BP: (136-173)/(70-88) 136/86    · IM/nephrology service managing anti-hypertensives, adjusting as needed

## 2020-02-29 NOTE — ASSESSMENT & PLAN NOTE
· Imaging reveals no acute fracture, but patient noted to have joint effusion, as well as "Significant joint space narrowing or bony erosion   Olecranon enthesopathy noted"  · Will require orthopedic f/u

## 2020-02-29 NOTE — ASSESSMENT & PLAN NOTE
Results from last 7 days   Lab Units 03/02/20  0636   HEMOGLOBIN g/dL 9 1*     · Monitor CBC intermittently  · Transfuse for Hgb <7

## 2020-02-29 NOTE — CONSULTS
Consultation - Sruthi Mckeon 37 y o  male MRN: [id number]    Unit/Bed#: -01 Encounter: [id number]      Assessment/Plan     Assessment: This is a 37y o -year-old male with type 1 diabetes with microvascular complications, history of two previous stroked, diabetic gastroparesis, end-stage renal disease on peritoneal dialysis and secondary hyperparathyroidism due to end-stage renal disease  Plan:  Type 1 diabetes with microvascular complications - patient is using dexcom  His blood sugars are currently trending down, glucose in 70s  Patient just took glucose tablet  Earlier in the morning blood sugars were elevated today because of higher content of dextrose in his peritoneal dialysis solution  - continue Lantus 14 units at bedtime and Humalog 4 units with meals, correctional insulin sliding scale and diabetic diet  I will continue current regimen and reassess tomorrow  End-stage renal disease on peritoneal dialysis -    Secondary hyperparathyroidism in the setting of end-stage renal disease - continue Sensipar     Diabetic gastroparesis - well controlled without reglan    CC: Diabetes Consult    History of Present Illness     HPI: Mckenzie Vanessa is a 37 y o  male with type 1 diabetes since he was 1years old  Patient was initially hospitalized for status epilepticus  He is on insulin at home  He denies any polyuria, polydipsia, nocturia and blurry vision  He denies heart attack but does admit to history of 2 strokes in the past, neuropathy, nephropathy and retinopathy, hypoglycemia unawareness, gastroparesis  He had episode of hypoglycemia while hospitalized in medsurg unit prior to his rehab  He had 1 previous hospitalization for hypoglycemia that was followed by hyperglycemia and diabetic ketoacidosis  He follows up with [facility]ocr[facility]   He has a family history of type 1 diabetes in his grandfather  He currently admits to nausea otherwise no issues    He is receiving peritoneal dialysis every evening approximately from 10:00 p m  until 10:00 a m  He denies any smoking or alcohol use  Inpatient consult to Endocrinology     Performed by  Pedro Gore MD     Authorized by Gabi Vazquez PA-C              Review of Systems   Constitutional: Negative for fatigue and unexpected weight change  HENT: Negative for congestion, postnasal drip and sore throat  Eyes: Negative for pain and redness  Respiratory: Negative for cough, chest tightness, shortness of breath and wheezing  Cardiovascular: Negative for chest pain, palpitations and leg swelling  Gastrointestinal: Positive for nausea  Negative for abdominal pain, constipation, diarrhea and vomiting  Endocrine: Negative for polydipsia and polyuria  Genitourinary: Negative for dysuria  Musculoskeletal: Negative for arthralgias and back pain  Neurological: Negative for dizziness, light-headedness and headaches  Psychiatric/Behavioral: Negative for agitation  The patient is not nervous/anxious  All other systems reviewed and are negative  Historical Information   Past Medical History:   Diagnosis Date    [facility] [address] )     Anxiety     Cerebellar stroke side undetermined 2015 2015,1/2018    [facility]     IDDM    Diarrhea     Gastroparesis     History of shingles 2010    History of transfusion 02/2018    Hypertension     Muscle weakness     general unsteadiness    Retinopathy      Past Surgical History:   Procedure Laterality Date    CARDIAC LOOP RECORDER  05/2018    EGD      EYE SURGERY      PERITONEAL CATHETER INSERTION N/A 8/27/2018    Procedure: UNROOF PD CATHETER;  Surgeon: Jay Perez DO;  [facility];  Service: General    SD ESOPHAGOGASTRODUODENOSCOPY TRANSORAL DIAGNOSTIC N/A 4/18/2019    Procedure: ESOPHAGOGASTRODUODENOSCOPY (EGD); Surgeon: Kevin Zhang MD;  Location[facility];   Service: Gastroenterology  [place] LAP INSERTION TUNNELED INTRAPERITONEAL CATHETER N/A 2018    Procedure: LAPAROSCOPIC PD CATHETER PLACEMENT;  Surgeon: Denice Vaca DO;  [facility];  Service: General    TONSILLECTOMY       Social History   Social History     Substance and Sexual Activity   Alcohol Use Not Currently    Comment: rarely     Social History     Substance and Sexual Activity   Drug Use Yes    Frequency: 5 0 times per week    Types: Marijuana    Comment: medical     Social History     Tobacco Use   Smoking Status Former Smoker    Packs/day: 0 50    Years: 12 00    Pack years: 6 00    Types: Cigarettes    Last attempt to quit: 2018    Years since quittin 0   Smokeless Tobacco Never Used   Tobacco Comment    quit 2018     Family History:   Family History   Problem Relation Age of Onset    Breast cancer Mother     Hypertension Mother     Hyperlipidemia Father     Hypertension Father     Leukemia Maternal Grandmother     Hyperlipidemia Maternal Grandfather     Hypertension Maternal Grandfather     Hyperlipidemia Paternal Grandmother     Hypertension Paternal Grandmother     Heart disease Paternal Grandfather         cardiac disorder    Diabetes Paternal Grandfather        Meds/Allergies   Current Facility-Administered Medications   Medication Dose Route Frequency Provider Last Rate Last Dose    acetaminophen (TYLENOL) tablet 650 mg  650 mg Oral Q8H PRN Mateo Vincent MD        aspirin chewable tablet 81 mg  81 mg Oral Daily Mateo Vincent MD   81 mg at 20 0849    atorvastatin (LIPITOR) tablet 40 mg  40 mg Oral HS Mateo Vincent MD   40 mg at 20 2121    calcium acetate (PHOSLO) capsule 667 mg  667 mg Oral TID With Meals Xavi Hernandes MD   667 mg at 20 0851    chlorhexidine (PERIDEX) 0 12 % oral rinse 15 mL  15 mL Swish & Spit Q12H [address] Xavi Hernandes MD   15 mL at 20 0847    cholecalciferol (VITAMIN D3) tablet 2,000 Units  2,000 Units Oral Daily Mateo Cecil Gomez MD   2,000 Units at 02/29/20 0847    cinacalcet (SENSIPAR) tablet 90 mg  90 mg Oral Daily Pablo Camacho MD   90 mg at 02/29/20 0859    cloNIDine (CATAPRES) tablet 0 1 mg  0 1 mg Oral Q8H [address] Mateo Vincent MD   0 1 mg at 02/29/20 0548    dextrose 50 % IV solution 25 g  25 g Intravenous PRN Pablo Camacho MD        diphenhydrAMINE (BENADRYL) 50 mg in sodium chloride 0 9 % 50 mL IVPB  50 mg Intravenous Daily PRN Pablo Camacho MD        doxazosin (CARDURA) tablet 2 mg  2 mg Oral HS Mateo Vincent MD   2 mg at 02/28/20 2122    gabapentin (NEURONTIN) capsule 100 mg  100 mg Oral HS Mateo Vincent MD   100 mg at 02/28/20 2123    gentamicin (GARAMYCIN) 0 1 % ointment 1 application  1 application Topical Daily Pablo Camacho MD   1 application at 60/17/33 2126    heparin (porcine) subcutaneous injection 5,000 Units  5,000 Units Subcutaneous [facility] Pablo Camacho MD   5,000 Units at 02/29/20 0548    hydrALAZINE (APRESOLINE) injection 20 mg  20 mg Intravenous Q4H PRN Pablo Camacho MD        hydrALAZINE (APRESOLINE) tablet 50 mg  50 mg Oral TID Pablo Camacho MD   50 mg at 02/29/20 0848    insulin glargine (LANTUS) subcutaneous injection 14 Units 0 14 mL  14 Units Subcutaneous HS Pablo Camacho MD   14 Units at 02/28/20 2123    insulin lispro (HumaLOG) 100 units/mL subcutaneous injection 1-5 Units  1-5 Units Subcutaneous TID AC Mateo Vincent MD   2 Units at 02/29/20 0845    insulin lispro (HumaLOG) 100 units/mL subcutaneous injection 1-5 Units  1-5 Units Subcutaneous HS Mateo Vincent MD        insulin lispro (HumaLOG) 100 units/mL subcutaneous injection 4 Units  4 Units Subcutaneous TID With Meals Pablo Camacho MD   4 Units at 02/29/20 0845    labetalol (NORMODYNE) tablet 300 mg  300 mg Oral Q12H [address] Mateo Vincent MD   300 mg at 02/29/20 0848    Labetalol HCl (NORMODYNE) injection 20 mg  20 mg Intravenous Q2H PRN Pablo Camacho MD        lactobacillus acidophilus-bulgaricus [facility]) packet 1 packet  1 packet Oral HS Kaity Kimble MD   1 packet at 02/28/20 2129    lisinopril (ZESTRIL) tablet 40 mg  40 mg Oral Daily Kaity Kimble MD   40 mg at 02/29/20 0847    NIFEdipine (PROCARDIA XL) 24 hr tablet 60 mg  60 mg Oral BID Kaity Kimble MD   60 mg at 02/29/20 0849    ondansetron (ZOFRAN) injection 4 mg  4 mg Intravenous Q4H PRN Kaity Kimble MD   4 mg at 02/29/20 0840    ondansetron (ZOFRAN-ODT) dispersible tablet 4 mg  4 mg Oral Q8H [address] Mateo Vincent MD   4 mg at 02/29/20 0548    pantoprazole (PROTONIX) EC tablet 40 mg  40 mg Oral Early Morning Mateo Vincent MD   40 mg at 02/29/20 0548    torsemide (DEMADEX) tablet 100 mg  100 mg Oral Daily With Lunch Mateo Vincent MD        valproic acid (DEPAKENE) capsule 750 mg  750 mg Oral Q12H [address] Mateo Vincent MD   750 mg at 02/29/20 0850     Allergies   Allergen Reactions    Sulfa Antibiotics Rash       Objective   Vitals: Blood pressure 157/89, pulse 77, temperature 98 1 °F (36 7 °C), temperature source Oral, resp  rate 20, height 5' 11" (1 803 m), weight 100 kg (221 lb 5 5 oz), SpO2 96 %  Intake/Output Summary (Last 24 hours) at 2/29/2020 1151  Last data filed at 2/29/2020 1135  Gross per 24 hour   Intake 220 ml   Output 100 ml   Net 120 ml     Invasive Devices     Peripheral Intravenous Line            Peripheral IV 02/26/20 Right Forearm 2 days                Physical Exam   Constitutional: He appears well-developed and well-nourished  HENT:   Head: Normocephalic and atraumatic  Mouth/Throat: No oropharyngeal exudate  Eyes: Pupils are equal, round, and reactive to light  Conjunctivae are normal    Neck: Neck supple  Cardiovascular: Normal rate and regular rhythm  No murmur heard  Pulmonary/Chest: Effort normal and breath sounds normal  No respiratory distress  Abdominal: Soft  He exhibits no distension  There is no tenderness  Musculoskeletal: He exhibits no edema  Neurological: He is alert  Skin: Skin is warm and dry  No erythema  Psychiatric: He has a normal mood and affect  The history was obtained from the review of the chart, patient  Lab Results:       Lab Results   Component Value Date    WBC 8 70 02/25/2020    HGB 8 6 (L) 02/25/2020    HCT 25 4 (L) 02/25/2020    MCV 97 02/25/2020     02/25/2020     Lab Results   Component Value Date/Time    BUN 50 (H) 02/26/2020 05:00 AM    BUN 34 (H) 11/03/2015 11:32 AM     10/28/2015 05:43 PM    K 3 8 02/26/2020 05:00 AM    K 3 6 10/28/2015 05:43 PM    CL 98 (L) 02/26/2020 05:00 AM     10/28/2015 05:43 PM    CO2 22 02/26/2020 05:00 AM    CO2 24 02/21/2020 08:50 AM    CREATININE 14 50 (H) 02/26/2020 05:00 AM    CREATININE 1 90 (H) 11/03/2015 11:33 AM    AST 23 02/22/2020 05:01 AM    AST 10 10/28/2015 05:43 PM    ALT 16 02/22/2020 05:01 AM    ALT 25 10/28/2015 05:43 PM    ALB 3 0 (L) 02/22/2020 05:01 AM    ALB 4 0 10/28/2015 05:43 PM     No results for input(s): CHOL, HDL, LDL, TRIG, VLDL in the last 72 hours  No results found for: Mariama Muir  POC Glucose (mg/dl)   Date Value   02/29/2020 215 (H)   02/29/2020 306 (H)   02/29/2020 242 (H)   02/28/2020 126   02/28/2020 133   02/28/2020 162 (H)   02/28/2020 139   02/28/2020 177 (H)   02/27/2020 159 (H)   02/27/2020 188 (H)       Imaging Studies: I have personally reviewed pertinent reports  Portions of the record may have been created with voice recognition software

## 2020-02-29 NOTE — PROGRESS NOTES
OT EVALUATION        02/29/20 7312   Patient Data   Rehab Impairment Impairment of mobility, safety and Activities of Daily Living (ADLs) due to Neurologic Conditions:  03 9  Other Neurologic Disorder     Etiologic Diagnosis New Onset Seizures due to Uncontrolled Hypertension   Date of Onset 02/20/20   Home Setup   Type of Home Multi Level   Method of Entry Stairs   Number of Stairs 2   Number of Stairs in Home 12   First Floor Bathroom Half   Second Floor Bathroom Full;Combo; Door   Second Floor Bathroom Accessibility Shower chair   Baseline Information   Vocation Other  (disability)   Prior Device(s) Used [address] [facility]; Shower Chair   Prior IADL Participation   Money Management Identify Money;Estimate Costs;Estimate Change;Combine Bills;Manage Checkbook   Meal Preparation Microwave;Stove top; Oven;Full Participation   Laundry Partial Participation   Home Cleaning Partial Participation   Prior Level of Function   Self-Care 3  Independent - Patient completed the activities by him/herself, with or without an assistive device, with no assistance from a helper  Indoor-Mobility (Ambulation) 3  Independent - Patient completed the activities by him/herself, with or without an assistive device, with no assistance from a helper  Stairs 3  Independent - Patient completed the activities by him/herself, with or without an assistive device, with no assistance from a helper  Functional Cognition 3  Independent - Patient completed the activities by him/herself, with or without an assistive device, with no assistance from a helper     Prior Assistance Needed for Driving;Household Chores/Cleaning   Prior Device Used   (SPC in community)   Falls in the Last Year   Number of falls in the past 12 months 3   Type of Injury Associated with Fall Injury   Pain Assessment   Pain Assessment No/denies pain   Pain Score No Pain   Eating Assessment   Type of Assistance Needed Independent   Amount of Physical Assistance Provided No physical assistance   Eating CARE Score 6   Oral Hygiene   Type of Assistance Needed Physical assistance   Amount of Physical Assistance Provided 25%-49%   Comment A in stance   Oral Hygiene CARE Score 3   Tub/Shower Transfer   Limitations Noted In Balance; Coordination; Endurance;Problem Solving; Safety; Sequencing   Adaptive Equipment Seat with Back   Assessed Shower   Findings Pt has tub shower  Pt sit in tub shower, on shower chair, however completes sponge bath regularly   Shower/Bathe Self   Type of Assistance Needed Physical assistance   Amount of Physical Assistance Provided Less than 25%   Comment A for balance in stance  SPONGE BATH COMPLETED   Shower/Bathe Self CARE Score 3   Bathing   Assessed Bath Style Sponge Bath   Anticipated D/C Bath Style Sponge Bath   Dressing/Undressing Clothing   Remove UB Clothes Pullover [address] UB Clothes Pullover Shirt   Type of Assistance Needed Physical assistance   Amount of Physical Assistance Provided Less than 25%   Comment did not obtain clothing   Upper Body Dressing CARE Score 3   Remove LB Clothes Pants; Undergarment   Don LB Clothes Pants; Undergarment   Type of Assistance Needed Physical assistance   Amount of Physical Assistance Provided 25%-49%   Comment A for balance   Lower Body Dressing CARE Score 3   Positioning Supported Sit   Putting On/Taking Off Footwear   Type of Assistance Needed Physical assistance   Amount of Physical Assistance Provided Less than 25%   Putting On/Taking Off Footwear CARE Score 3   Toileting Hygiene   Type of Assistance Needed Physical assistance   Amount of Physical Assistance Provided 25%-49%   Comment A for balance   Toileting Hygiene CARE Score 3   Toilet Transfer   Surface Assessed Standard Toilet   Transfer Technique Standard   Type of Assistance Needed Physical assistance   Amount of Physical Assistance Provided 25%-49%   Toilet Transfer CARE Score 3   Transfer Bed/Chair/Wheelchair   Type of Assistance Needed Physical assistance   Amount of Physical Assistance Provided 25%-49%   Chair/Bed-to-Chair Transfer CARE Score 3   Sit to Stand   Type of Assistance Needed Physical assistance; Adaptive equipment   Amount of Physical Assistance Provided Less than 25%   Sit to Stand CARE Score 3   Comprehension   QI: Comprehension 3  Usually Understands: Understands most conversations, but misses some part/intent of message  Requires cues at times to understand  Expression   QI: Expression 3  Exhibits some difficulty with expressing needs and ideas (e g , some words or finishing thoughts) or speech is not clear   Coordination   Movements are Fluid and Coordinated 0   Coordination and Movement Description slight ataxia, decreased motor speed   Sensation   Light Touch Partial deficits in the RLE;Partial deficits in the LLE  (neuopathy in B feet)   Cognition   Overall Cognitive Status Impaired   Arousal/Participation Cooperative   Attention Attends with cues to redirect   Orientation Level Oriented X4   Memory Decreased recall of precautions;Decreased recall of recent events;Decreased short term memory   Following Commands Follows one step commands with increased time or repetition   Comments Decreased insight, decreased safety   Discharge [address] Retired/not working  (disability  )   Patient's Discharge Plan Return home with supportive family   Patient's Rehab Expectations Get stronger, feel better   Barriers to Discharge Home Unsafe Home Setup; Decreased Cognitive Function;Decreased Strength;Decreased Endurance; Safety Considerations   Impressions Pt is a 37year old male who was seen for an OT evaluation following admission to [facility] due with complaints of dizziness x2 days  Patient has a history of CVA x2 with resultant chronic vertigo  Patient also states some word-finding difficulty  On 2/21, a RRT was called after patient was found on the floor actively seizing   Patient was poorly responsive with alternating left/right deviated gaze  Patient was emergently intubated  Pt's comorbidities include: Vertigo, hypertensive urgency, new onset seizures, acute respiratory failure, hypo/hyperglycemia, anxiety, CVA with resultant chronic vertigo 2015 & 2018, DM1, gastroparesis, HTN, ESRD on PD, former smoker  Prior to admission pt was completing ADL's independently, and use a SPC for community mobility  Pt was not driving, and had a life alert    Currently, pt requires Min-Mod assist for overall ADLs, and requires Min-Mod assist for transfers using RW  Pt is currently limited due to the following deficits impacting occupational performance, activity tolerance, endurance, standing balance/tolerance and sitting balance/tolerance  The patient has shown a decline from prior level of function  The patient participates in identification of personal goals to address in Occupational Therapy  Pt benefits from skilled OT services for I/ADL retraining to support the ability to safely participate in daily occupations safely and independently in the most least restrictive way  From OT standpoint, Pt demonstrates Good rehab potential to meet LTG's  Pt is a good rehab candidate, Anticipate 10-14 day length of stay  Occupational Therapy plan of care to focus on the following areas:  ADL re-training, [address] training, IADL re-training, functional transfers, standing tolerance, standing balance, DME training/education, family training/education, and EC techniques/education     OT Therapy Minutes   OT Time In 0930   OT Time Out 1100   OT Total Time (minutes) 90   OT Mode of treatment - Individual (minutes) 90   OT Mode of treatment - Concurrent (minutes) 0   OT Mode of treatment - Group (minutes) 0   OT Mode of treatment - Co-treat (minutes) 0   OT Mode of Treatment - Total time(minutes) 90 minutes   OT Cumulative Minutes 90   Cumulative Minutes   Cumulative therapy minutes 90

## 2020-02-29 NOTE — ASSESSMENT & PLAN NOTE
Recent Labs     03/02/20  2110 03/03/20  0155 03/03/20  0639 03/03/20  1059   POCGLU 214* 373* 290* 85     · Last A1C: 5 6  · Continue diabetic diet  · Continue SSI  · On Lantus, Humalog   · Endocrinology monitoring blood sugars, adjusting regimen as needed  Patient is to resume home regimen on discharge  They will f/u with [facility] endocrinologist on discharge

## 2020-02-29 NOTE — ASSESSMENT & PLAN NOTE
Results from last 7 days   Lab Units 03/03/20  0644 03/02/20  0636 02/26/20  0500   CREATININE mg/dL 13 70* 13 70* 14 50*     · Continue monitoring kidney function via intermittent BMP  · Avoid nephrotoxic meds/NSAIDs  · Patient performs PD at home  · Nephrology team following, appreciate migdalia

## 2020-02-29 NOTE — PROGRESS NOTES
PT LTG's       02/29/20 1330   Rehab Team Goals   Transfer Team Goal Patient will require supervision with transfers with least restrictive device upon completion of rehab program   Locomotion Team Goal Patient will require supervision with locomotion with least restrictive device upon completion of rehab program   Rehab Team Interventions   PT Interventions Gait Training; Therapeutic Exercise;Neuromuscualr Reeducation;Transfer Training;Community Reintegration;Bed Mobility; Wheelchair Mobility; Patient/Family Education   PT Transfer Goal   Roll left and right Goal 06  Independent - Patient completes the activity by him/herself with no assistance from a helper  Sit to lying Goal 06  Independent - Patient completes the activity by him/herself with no assistance from a helper  Lying to sitting on side of bed Goal 06  Independent - Patient completes the activity by him/herself with no assistance from a helper  Sit to stand Goal 04  Supervision or touching assistance- [place] provides VERBAL CUES or supervision throughout activity  Chair/bed-to-chair transfer Goal 04  Supervision or touching assistance- [place] provides VERBAL CUES or supervision throughout activity  Car Transfer Goal 04  Supervision or touching assistance- [place] provides VERBAL CUES or supervision throughout activity  Assistive Device Single [employer]   Environment Level Surface; Uneven Surface; Well Lit; Tile Floor; Carpet; Distractions   Status Ongoing; Target goal - two weeks   Locomotion Goal   Primary discharge mode of locomotion Walking   Target Walk Distance 500 ft   Assist Device Cane   Gait Pattern Improvement Ataxic; Inconsistant Marcelle; Improper weight shift;Decreased foot clearance   Environment Level Surface; Uneven Surface; Well Lit; Tile Floor; Carpet; Distractions   Walk 10 feet Goal 04  Supervision or touching assistance- [place] provides VERBAL CUES or supervision throughout activity  Walk 50 feet with 2 turns Goal 04   Supervision or touching assistance- [place] provides VERBAL CUES or supervision throughout activity  Walk 150 feet Goal 04  Supervision or touching assistance- [place] provides VERBAL CUES or supervision throughout activity  Walking 10 feet on uneven surface 04  Supervision or touching assistance- [place] provides VERBAL CUES or supervision throughout activity  Walking Goal Status Ongoing; Target goal - two weeks   Stairs Goal   1 step or curb goal 04  TOUCHING/ STEADYING assistance as patient completes activity  (no handrails)   4 steps Goal 04  Supervision or touching assistance- [place] provides VERBAL CUES or supervision throughout activity  12 steps Goal 04  Supervision or touching assistance- [place] provides VERBAL CUES or supervision throughout activity  Assist Level Supervision   Number of Stairs 12   Technique Non-reciprocal   Hand Rail Bilateral   Status Ongoing; Target goal - two weeks

## 2020-02-29 NOTE — PROGRESS NOTES
NEPHROLOGY PROGRESS NOTE   Kisha Mckeon 37 y o  male MRN: [id number]  Unit/Bed#: -12 Encounter: [id number]  Reason for Consult:  End-stage renal disease    ASSESSMENT/PLAN:  1  End-stage renal disease, currently on CCPD, 10 hours, 12 5 L, exchange volumes of 2 2 L with the last fill of 1 5 L    2  Accelerated hypertension, overall blood pressure significantly improved, continue with current regimen  3  Anemia of chronic disease, HETAL currently on hold given new onset seizure disorder as well as previous accelerated hypertension  4  Mineral bone disorder secondary to CKD, continue with calcium acetate   5  Seizure disorder, currently on valproic acid  6  History of CVA    PLAN:  · Unfortunately last evening cycler became a plugged and was unable to resume treatment  · Also complains of worsening blood sugars  · Will continue with CCPD however will eliminate icodextran    SUBJECTIVE:  Seen examined  Patient awake alert  Unfortunate again last evening cycler became on plugged unable to resume treatment  Denies any chest pain shortness of breath  Appetite seems to be stable  Review of Systems    OBJECTIVE:  Current Weight: Weight - Scale: 100 kg (221 lb 5 5 oz)  Vitals:    02/28/20 1422 02/28/20 1940 02/29/20 0511   BP: 141/77 137/86 157/89   BP Location: Right arm  Right arm   Pulse: 76 74 77   Resp: 18 16 20   Temp: 97 9 °F (36 6 °C) 98 2 °F (36 8 °C) 98 1 °F (36 7 °C)   TempSrc: Oral Oral Oral   SpO2: 98% 97% 96%   Weight:  100 kg (221 lb 5 5 oz)    Height:  5' 11" (1 803 m)        Intake/Output Summary (Last 24 hours) at 2/29/2020 1045  Last data filed at 2/29/2020 0319  Gross per 24 hour   Intake    Output 100 ml   Net -100 ml       Physical Exam   Constitutional: He is oriented to person, place, and time  No distress  HENT:   Head: Normocephalic  Eyes: No scleral icterus  Neck: Neck supple  Cardiovascular: Normal rate and regular rhythm     Pulmonary/Chest: Effort normal and breath sounds normal    Abdominal: Soft  Musculoskeletal: He exhibits no edema  Neurological: He is alert and oriented to person, place, and time  Skin: Skin is warm and dry  No rash noted         Medications:    Current Facility-Administered Medications:     acetaminophen (TYLENOL) tablet 650 mg, 650 mg, Oral, Q8H PRN, Remberto Vasquez MD    aspirin chewable tablet 81 mg, 81 mg, Oral, Daily, Mateo Vincent MD, 81 mg at 02/29/20 0849    atorvastatin (LIPITOR) tablet 40 mg, 40 mg, Oral, HS, Mateo Vincent MD, 40 mg at 02/28/20 2121    calcium acetate (PHOSLO) capsule 667 mg, 667 mg, Oral, TID With Meals, Remberto Vasquez MD, 667 mg at 02/29/20 0851    chlorhexidine (PERIDEX) 0 12 % oral rinse 15 mL, 15 mL, Swish & Spit, Q12H [address], Remberto Vasquez MD, 15 mL at 02/29/20 0847    cholecalciferol (VITAMIN D3) tablet 2,000 Units, 2,000 Units, Oral, Daily, Remberto Vasquez MD, 2,000 Units at 02/29/20 0847    cinacalcet (SENSIPAR) tablet 90 mg, 90 mg, Oral, Daily, Mateo Vincent MD, 90 mg at 02/29/20 0859    cloNIDine (CATAPRES) tablet 0 1 mg, 0 1 mg, Oral, Q8H [address], Mateo Vincent MD, 0 1 mg at 02/29/20 0548    dextrose 50 % IV solution 25 g, 25 g, Intravenous, PRN, Remberto Vasquez MD    diphenhydrAMINE (BENADRYL) 50 mg in sodium chloride 0 9 % 50 mL IVPB, 50 mg, Intravenous, Daily PRN, Remberto Vasquez MD    doxazosin (CARDURA) tablet 2 mg, 2 mg, Oral, HS, Mateo Vincent MD, 2 mg at 02/28/20 2122    gabapentin (NEURONTIN) capsule 100 mg, 100 mg, Oral, HS, Mateo Vincent MD, 100 mg at 02/28/20 2123    gentamicin (GARAMYCIN) 0 1 % ointment 1 application, 1 application, Topical, Daily, Mateo Vincent MD, 1 application at 14/36/01 2126    heparin (porcine) subcutaneous injection 5,000 Units, 5,000 Units, Subcutaneous, Q8H [address], Mateo Vincent MD, 5,000 Units at 02/29/20 0548    hydrALAZINE (APRESOLINE) injection 20 mg, 20 mg, Intravenous, Q4H PRN, Remberto Vasquez MD    hydrALAZINE (APRESOLINE) tablet 50 mg, 50 mg, Oral, TID, Mateo Vincent MD, 50 mg at 02/29/20 0848    insulin glargine (LANTUS) subcutaneous injection 14 Units 0 14 mL, 14 Units, Subcutaneous, HS, Xavi Hernandes MD, 14 Units at 02/28/20 2123    insulin lispro (HumaLOG) 100 units/mL subcutaneous injection 1-5 Units, 1-5 Units, Subcutaneous, TID AC, 2 Units at 02/29/20 0845 **AND** Fingerstick Glucose (POCT), , , TID AC, Mateo Vincent MD    insulin lispro (HumaLOG) 100 units/mL subcutaneous injection 1-5 Units, 1-5 Units, Subcutaneous, HS, Mateo Vincent MD    insulin lispro (HumaLOG) 100 units/mL subcutaneous injection 4 Units, 4 Units, Subcutaneous, TID With Meals, Xavi Hernandes MD, 4 Units at 02/29/20 0845    labetalol (NORMODYNE) tablet 300 mg, 300 mg, Oral, Q12H [facility], Mateo Vincent MD, 300 mg at 02/29/20 0848    Labetalol HCl (NORMODYNE) injection 20 mg, 20 mg, Intravenous, Q2H PRN, Mateo Vincent MD    lactobacillus acidophilus-bulgaricus (FLORANEX) packet 1 packet, 1 packet, Oral, HS, Xavi Hernandes MD, 1 packet at 02/28/20 2129    lisinopril (ZESTRIL) tablet 40 mg, 40 mg, Oral, Daily, Mateo Vincent MD, 40 mg at 02/29/20 0847    NIFEdipine (PROCARDIA XL) 24 hr tablet 60 mg, 60 mg, Oral, BID, Mateo Vincent MD, 60 mg at 02/29/20 0849    ondansetron (ZOFRAN) injection 4 mg, 4 mg, Intravenous, Q4H PRN, Mateo Vincent MD, 4 mg at 02/29/20 0840    ondansetron (ZOFRAN-ODT) dispersible tablet 4 mg, 4 mg, Oral, Q8H [facility], Mateo Vincent MD, 4 mg at 02/29/20 0548    pantoprazole (PROTONIX) EC tablet 40 mg, 40 mg, Oral, Early Morning, Mateo Vincent MD, 40 mg at 02/29/20 0548    torsemide (DEMADEX) tablet 100 mg, 100 mg, Oral, Daily With Lunch, Xavi Hernandes MD    valproic acid (DEPAKENE) capsule 750 mg, 750 mg, Oral, Q12H [facility], Mateo Vincent MD, 750 mg at 02/29/20 0850    Laboratory Results:  Results from last 7 days   Lab Units 02/26/20  0500 02/25/20  0759 02/24/20  0601 02/23/20  0437   WBC Thousand/uL  --  8 70 9 90 10 91*   HEMOGLOBIN g/dL  --  8 6* 9 1* 9 5*   HEMATOCRIT %  --  25 4* 28 1* 29 2*   PLATELETS Thousands/uL  --  249 242 254   POTASSIUM mmol/L 3 8 4 1 3 7 3 7   CHLORIDE mmol/L 98* 99* 105 103   CO2 mmol/L 22 22 23 24   BUN mg/dL 50* 47* 47* 52*   CREATININE mg/dL 14 50* 14 50* 15 40* 15 40*   CALCIUM mg/dL 8 9 8 8 9 7 9 2   PHOSPHORUS mg/dL  --   --  8 8*  --

## 2020-03-01 LAB
GLUCOSE SERPL-MCNC: 129 MG/DL (ref 65–140)
GLUCOSE SERPL-MCNC: 279 MG/DL (ref 65–140)
GLUCOSE SERPL-MCNC: 337 MG/DL (ref 65–140)
GLUCOSE SERPL-MCNC: 343 MG/DL (ref 65–140)
GLUCOSE SERPL-MCNC: 376 MG/DL (ref 65–140)
GLUCOSE SERPL-MCNC: 95 MG/DL (ref 65–140)

## 2020-03-01 PROCEDURE — 99232 SBSQ HOSP IP/OBS MODERATE 35: CPT | Performed by: INTERNAL MEDICINE

## 2020-03-01 PROCEDURE — NC001 PR NO CHARGE: Performed by: INTERNAL MEDICINE

## 2020-03-01 PROCEDURE — 82948 REAGENT STRIP/BLOOD GLUCOSE: CPT

## 2020-03-01 PROCEDURE — 97530 THERAPEUTIC ACTIVITIES: CPT

## 2020-03-01 RX ORDER — INSULIN GLARGINE 100 [IU]/ML
16 INJECTION, SOLUTION SUBCUTANEOUS
Status: DISCONTINUED | OUTPATIENT
Start: 2020-03-01 | End: 2020-03-03

## 2020-03-01 RX ADMIN — LISINOPRIL 40 MG: 20 TABLET ORAL at 08:46

## 2020-03-01 RX ADMIN — VALPROIC ACID 750 MG: 250 CAPSULE, LIQUID FILLED ORAL at 20:48

## 2020-03-01 RX ADMIN — ONDANSETRON 4 MG: 4 TABLET, ORALLY DISINTEGRATING ORAL at 15:34

## 2020-03-01 RX ADMIN — ASPIRIN 81 MG 81 MG: 81 TABLET ORAL at 08:46

## 2020-03-01 RX ADMIN — INSULIN GLARGINE 16 UNITS: 100 INJECTION, SOLUTION SUBCUTANEOUS at 21:06

## 2020-03-01 RX ADMIN — GABAPENTIN 100 MG: 100 CAPSULE ORAL at 21:06

## 2020-03-01 RX ADMIN — INSULIN LISPRO 4 UNITS: 100 INJECTION, SOLUTION INTRAVENOUS; SUBCUTANEOUS at 08:48

## 2020-03-01 RX ADMIN — GENTAMICIN SULFATE 1 APPLICATION: 1 OINTMENT TOPICAL at 23:28

## 2020-03-01 RX ADMIN — NIFEDIPINE 60 MG: 60 TABLET, FILM COATED, EXTENDED RELEASE ORAL at 17:47

## 2020-03-01 RX ADMIN — TORSEMIDE 100 MG: 20 TABLET ORAL at 11:40

## 2020-03-01 RX ADMIN — ONDANSETRON 4 MG: 4 TABLET, ORALLY DISINTEGRATING ORAL at 05:36

## 2020-03-01 RX ADMIN — VALPROIC ACID 750 MG: 250 CAPSULE, LIQUID FILLED ORAL at 08:50

## 2020-03-01 RX ADMIN — HEPARIN SODIUM 5000 UNITS: 5000 INJECTION INTRAVENOUS; SUBCUTANEOUS at 05:34

## 2020-03-01 RX ADMIN — ONDANSETRON 4 MG: 4 TABLET, ORALLY DISINTEGRATING ORAL at 21:07

## 2020-03-01 RX ADMIN — LACTOBACILLUS ACIDOPHILUS / LACTOBACILLUS BULGARICUS 1 PACKET: 100 MILLION CFU STRENGTH GRANULES at 21:07

## 2020-03-01 RX ADMIN — HEPARIN SODIUM 5000 UNITS: 5000 INJECTION INTRAVENOUS; SUBCUTANEOUS at 21:07

## 2020-03-01 RX ADMIN — NIFEDIPINE 60 MG: 60 TABLET, FILM COATED, EXTENDED RELEASE ORAL at 08:47

## 2020-03-01 RX ADMIN — CHLORHEXIDINE GLUCONATE 0.12% ORAL RINSE 15 ML: 1.2 LIQUID ORAL at 08:46

## 2020-03-01 RX ADMIN — MELATONIN 2000 UNITS: at 08:46

## 2020-03-01 RX ADMIN — ONDANSETRON 4 MG: 2 INJECTION INTRAMUSCULAR; INTRAVENOUS at 09:05

## 2020-03-01 RX ADMIN — LABETALOL HCL 300 MG: 200 TABLET, FILM COATED ORAL at 20:47

## 2020-03-01 RX ADMIN — INSULIN LISPRO 4 UNITS: 100 INJECTION, SOLUTION INTRAVENOUS; SUBCUTANEOUS at 17:48

## 2020-03-01 RX ADMIN — CLONIDINE HYDROCHLORIDE 0.1 MG: 0.1 TABLET ORAL at 15:34

## 2020-03-01 RX ADMIN — DOXAZOSIN 2 MG: 2 TABLET ORAL at 21:06

## 2020-03-01 RX ADMIN — ATORVASTATIN CALCIUM 40 MG: 40 TABLET, FILM COATED ORAL at 21:06

## 2020-03-01 RX ADMIN — CALCIUM ACETATE 667 MG: 667 CAPSULE ORAL at 08:49

## 2020-03-01 RX ADMIN — HYDRALAZINE HYDROCHLORIDE 50 MG: 50 TABLET, FILM COATED ORAL at 17:46

## 2020-03-01 RX ADMIN — CLONIDINE HYDROCHLORIDE 0.1 MG: 0.1 TABLET ORAL at 05:33

## 2020-03-01 RX ADMIN — CHLORHEXIDINE GLUCONATE 0.12% ORAL RINSE 15 ML: 1.2 LIQUID ORAL at 20:47

## 2020-03-01 RX ADMIN — INSULIN LISPRO 3 UNITS: 100 INJECTION, SOLUTION INTRAVENOUS; SUBCUTANEOUS at 21:07

## 2020-03-01 RX ADMIN — INSULIN LISPRO 4 UNITS: 100 INJECTION, SOLUTION INTRAVENOUS; SUBCUTANEOUS at 11:41

## 2020-03-01 RX ADMIN — INSULIN LISPRO 4 UNITS: 100 INJECTION, SOLUTION INTRAVENOUS; SUBCUTANEOUS at 05:37

## 2020-03-01 RX ADMIN — CINACALCET HYDROCHLORIDE 90 MG: 30 TABLET, FILM COATED ORAL at 08:51

## 2020-03-01 RX ADMIN — CLONIDINE HYDROCHLORIDE 0.1 MG: 0.1 TABLET ORAL at 21:07

## 2020-03-01 RX ADMIN — CALCIUM ACETATE 667 MG: 667 CAPSULE ORAL at 11:41

## 2020-03-01 RX ADMIN — INSULIN LISPRO 3 UNITS: 100 INJECTION, SOLUTION INTRAVENOUS; SUBCUTANEOUS at 08:48

## 2020-03-01 RX ADMIN — HYDRALAZINE HYDROCHLORIDE 50 MG: 50 TABLET, FILM COATED ORAL at 08:47

## 2020-03-01 RX ADMIN — HEPARIN SODIUM 5000 UNITS: 5000 INJECTION INTRAVENOUS; SUBCUTANEOUS at 15:34

## 2020-03-01 RX ADMIN — PANTOPRAZOLE SODIUM 40 MG: 40 TABLET, DELAYED RELEASE ORAL at 05:33

## 2020-03-01 RX ADMIN — INSULIN LISPRO 2 UNITS: 100 INJECTION, SOLUTION INTRAVENOUS; SUBCUTANEOUS at 17:47

## 2020-03-01 RX ADMIN — HYDRALAZINE HYDROCHLORIDE 50 MG: 50 TABLET, FILM COATED ORAL at 21:06

## 2020-03-01 RX ADMIN — LABETALOL HCL 300 MG: 200 TABLET, FILM COATED ORAL at 08:47

## 2020-03-01 RX ADMIN — CALCIUM ACETATE 667 MG: 667 CAPSULE ORAL at 17:47

## 2020-03-01 NOTE — PROGRESS NOTES
Internal Medicine Progress Note  Patient: Malcolm Gann  Age/sex: 37 y o  male  Medical Record #: [id number]      ASSESSMENT/PLAN: (Interval History)  Malcolm Gann is seen and examined and management for following issues:    New seizures  · Had status epilepticus on admission  · Per Neurology strong suspicion for provoked seizure in the setting of uncontrolled HTN  · Continue VPA  · Outpt follow-up with Epilepsy team      HTN  · Stable  · Continue current medications  · Nephrology was managing inpatient      DM type 1  · HA1C 5 6  · Continue insulin as ordered  · Continue SSI and accuchecks  Adjust medications as needed  · Endocrinology following  · Non compliant with diet, family bringing outside food to bedside     ESRD on peritoneal dialysis  · Nephrology managing  · Adjustments made to fluids used during treatments     Diabetic gastroparesis  · Suspected etiology of nausea and emesis  · Continue Protonix and ATC Zofran  · Outpt GI follow-up      History of cerebellar stroke  · Continue ASA 81mg daily  · Continue risk factor modification    Anemia of chronic disease  · Monitor cbc  · Transfusion per renal    The above assessment and plan was reviewed and updated as determined by my evaluation of the patient on 3/1/2020      Labs:   Results from last 7 days   Lab Units 02/25/20  0759 02/24/20  0601   WBC Thousand/uL 8 70 9 90   HEMOGLOBIN g/dL 8 6* 9 1*   HEMATOCRIT % 25 4* 28 1*   PLATELETS Thousands/uL 249 242     Results from last 7 days   Lab Units 02/26/20  0500 02/25/20  0759   SODIUM mmol/L 134* 134*   POTASSIUM mmol/L 3 8 4 1   CHLORIDE mmol/L 98* 99*   CO2 mmol/L 22 22   BUN mg/dL 50* 47*   CREATININE mg/dL 14 50* 14 50*   CALCIUM mg/dL 8 9 8 8             Results from last 7 days   Lab Units 03/01/20  0639 03/01/20  0504 02/29/20  2048   POC GLUCOSE mg/dl 337* 376* 235*       Review of Scheduled Meds:    Current Facility-Administered Medications:  acetaminophen 650 mg Oral Q8H PRN Katherin Rico MD   aspirin 81 mg Oral Daily Mateo Vincent MD   atorvastatin 40 mg Oral HS Mateo Vincent MD   calcium acetate 667 mg Oral TID With Meals Katherin Rico MD   chlorhexidine 15 mL Swish & Spit Q12H [address] Katherin Rico MD   cholecalciferol 2,000 Units Oral Daily Katherin Rico MD   cinacalcet 90 mg Oral Daily Katherin Rico MD   cloNIDine 0 1 mg Oral Q8H [address] Mateo Vincent MD   dextrose 25 g Intravenous PRN Mateo Vincent MD   diphenhydrAMINE (BENADRYL) IVPB 50 mg Intravenous Daily PRN Mateo Vincent MD   doxazosin 2 mg Oral HS Mateo Vincent MD   gabapentin 100 mg Oral HS Katherin Rico MD   gentamicin 1 application Topical Daily Mateo Vincent MD   heparin (porcine) 5,000 Units Subcutaneous Q8H [address] Mateo Vincent MD   hydrALAZINE 20 mg Intravenous Q4H PRN Mateo Vincent MD   hydrALAZINE 50 mg Oral TID Mateo Vincent MD   insulin glargine 14 Units Subcutaneous HS Mateo Vincent MD   insulin lispro 1-5 Units Subcutaneous TID AC Mateo Vincent MD   insulin lispro 1-5 Units Subcutaneous HS Mateo Vincent MD   insulin lispro 4 Units Subcutaneous TID With Meals Mateo Vincent MD   labetalol 300 mg Oral Q12H [address] Mateo Vincent MD   Labetalol HCl 20 mg Intravenous Q2H PRN Mateo Vincent MD   lactobacillus acidophilus-bulgaricus 1 packet Oral HS Mateo Vincent MD   lisinopril 40 mg Oral Daily Mateo Vincent MD   NIFEdipine 60 mg Oral BID Mateo Vincent MD   ondansetron 4 mg Intravenous Q4H PRN Mateo Vincent MD   ondansetron 4 mg Oral Q8H [address] Mateo Vincent MD   pantoprazole 40 mg Oral Early Morning Mateo Vincent MD   torsemide 100 mg Oral Daily With Lunch Mateo Vincent MD   valproic acid 750 mg Oral Q12H Jessica Mercado MD       Subjective/ HPI: Patient seen and examined  Patients overnight issues or events were reviewed with nursing or staff during rounds or morning huddle session   New or overnight issues include the following:     Pt had better treatment overnight  BS still trending upward which he is concerned about; states he had both a BM as well as an episode of vomitting    ROS:   A 10 point ROS was performed; negative except as noted above  Imaging:     No orders to display       *Labs /Radiology studiesReviewed  *Medications Reviewed and reconciled as needed  *Please refer to order section for additional ordered labs studies  *Case discussed with primary attending during morning huddle case rounds    Physical Examination:  Vitals:   Vitals:    02/29/20 2009 02/29/20 2057 03/01/20 0500 03/01/20 0548   BP: 155/76  159/92    BP Location: Right arm  Right arm    Pulse: 76  81 86   Resp: 20  18 20   Temp: 98 5 °F (36 9 °C)  98 3 °F (36 8 °C) 98 8 °F (37 1 °C)   TempSrc: Oral  Oral Oral   SpO2: 96%  97% 96%   Weight:  103 kg (228 lb)     Height:           General Appearance: no distress, conversive  HEENT: PERRLA, conjuctiva normal; oropharynx clear; mucous membranes moist;   Neck:  Supple, no lymphadenopathy or thyromegaly  Lungs: CTA, normal respiratory effort, no retractions, expiratory effort normal  CV: regular rate and rhythm , PMI normal   ABD: soft non tender, obese +BS x4  EXT: DP pulses intact,  no edema  Skin: normal turgor, normal texture, no rash  Psych: affect normal, mood normal  Neuro: AAOx3          The above physical exam was reviewed and updated as determined by my evaluation of the patient on 3/1/2020  Invasive Devices     Peripheral Intravenous Line            Peripheral IV 02/26/20 Right Forearm 3 days                   VTE Pharmacologic Prophylaxis: Sequential compression device (Venodyne)   Code Status: Level 1 - Full Code  Current Length of Stay: 2 day(s)      Total time spent:  30 minutes with more than 50% spent counseling/coordinating care  Counseling includes discussion with patient re: progress  and discussion with patient of his/her current medical state/information   Coordination of patient's care was performed in conjunction with primary service  Time invested included review of patient's labs, vitals, and management of their comorbidities with continued monitoring  In addition, this patient was discussed with medical team including physician and advanced extenders  The care of the patient was extensively discussed and appropriate treatment plan was formulated unique for this patient  ** Please Note:  voice to text software may have been used in the creation of this document   Although proof errors in transcription or interpretation are a potential of such software**

## 2020-03-01 NOTE — PROGRESS NOTES
03/01/20 1030   Pain Assessment   Pain Assessment 0-10   Pain Score 3   Pain Type Chronic pain   Pain Location Back   Pain Orientation Lower   Pain Descriptors Aching; Sore   Pain Frequency Intermittent   Pain Onset Progressive   Effect of Pain on Daily Activities reports it happens with ext stance at baseline   Hospital Pain Intervention(s) Repositioned; Rest   Response to Interventions tolerated session   Restrictions/Precautions   Precautions Fall Risk;Bed/chair alarms; Seizure;Supervision on toilet/commode;Visual deficit; Impulsive   Weight Bearing Restrictions No   ROM Restrictions No   Sit to Stand   Type of Assistance Needed Supervision   Amount of Physical Assistance Provided No physical assistance   Sit to Stand CARE Score 4   Bed-Chair Transfer   Type of Assistance Needed Physical assistance   Amount of Physical Assistance Provided 25%-49%   Comment Iveth with RW 2* dec awareness of obstacles in evnironment and quick pace inc risk of fall  Even with cuing, pt demo no improvements in safety  Chair/Bed-to-Chair Transfer CARE Score 3   Functional Standing Tolerance   Time 5min, 2 5min, 3 75min   Activity unsupported stance while performing card matching and pipe tree with focus on standing tolerance and balacne while aslo engaging inhand to target and functional scanning tasks to promote inc functional task indep  Comments pt with posterior rocking when reaching in varying planes with no awareness      Cognition   Overall Cognitive Status Impaired   Arousal/Participation Cooperative   Attention Attends with cues to redirect   Orientation Level Oriented X4   Memory Decreased recall of precautions;Decreased recall of recent events;Decreased short term memory   Following Commands Follows one step commands with increased time or repetition   Comments very limited insight into deficits and inconsistent carryover with safety education   Activity Tolerance   Activity Tolerance Patient limited by fatigue Assessment   Treatment Assessment Pt participated in skilled OT session with focus on functional mobility, standing tolerance and balance, RW safety, obstacle negotiation  Per RN, pt walking through room in AM without staff present  Pt's parents present at start of session and appear very supportive  Verbalized importance of family training this week to help with safe dc planning with both receptive  Will be present during therapy and also will communicate with staff if certain times are better for training  Pt very impulsive with mobility attempting to move prior to therapist being within reach and prior to removal of obstacles  Due to vision deficits, pt has limited awareness of obstacles in environment as demo by running into door frame, chairs, and people in environment even with cuing to alert pt  Performed task where pt req to retrieve bean bags from around therapy unit with focus on obstacle awareness and negotiation, safety with RW, and practice with [address] [facility]  Pt unsafe with RW currently as he turns quickly, does not keep RW on ground req cuing about 50% of time  Provided multiple visual and verbal directions for safe RW management  Pt would like orders to go off unit with family/friends; however, at this time, OT does not believe pt to be appropriate as he has demo'd that he will attempt to walk without trained staff present limiting safety when off unit  Pt would benefit from cont therapy with focus on balance, safety awareness, functional vision, RW safety, self care, and IADL management to ensure pt safety upon dc  Prognosis Fair   Problem List Decreased strength;Decreased endurance; Impaired balance;Decreased mobility; Decreased cognition; Impaired judgement;Decreased safety awareness; Impaired vision   Barriers to Discharge Decreased caregiver support   Plan   Treatment/Interventions ADL retraining;Functional transfer training; Therapeutic exercise; Endurance training;Cognitive reorientation;Patient/family training;Equipment eval/education; Bed mobility   Progress Progressing toward goals   Recommendation   OT Discharge Recommendation Home with family support  (pending pt progress)   OT Therapy Minutes   OT Time In 1030   OT Time Out 1130   OT Total Time (minutes) 60   OT Mode of treatment - Individual (minutes) 60   OT Mode of treatment - Concurrent (minutes) 0   OT Mode of treatment - Group (minutes) 0   OT Mode of treatment - Co-treat (minutes) 0   OT Mode of Treatment - Total time(minutes) 60 minutes   OT Cumulative Minutes 150   Therapy Time missed   Time missed?  No

## 2020-03-01 NOTE — PROGRESS NOTES
Progress Note - Mariam Mckeon 37 y o  male MRN: [id number]    Unit/Bed#: -01 Encounter: [id number]      CC: diabetes f/u    Subjective: Renetta Lomeli is a 37y o  year old male with type 1 diabetes  Feels well  No complaints  Patient had hypoglycemia yesterday at 4:00 p m  - 75    Objective:     Vitals: Blood pressure 159/92, pulse 86, temperature 98 8 °F (37 1 °C), temperature source Oral, resp  rate 20, height 5' 11" (1 803 m), weight 103 kg (228 lb), SpO2 96 %  ,Body mass index is 31 8 kg/m²  Intake/Output Summary (Last 24 hours) at 3/1/2020 1058  Last data filed at 2/29/2020 1657  Gross per 24 hour   Intake 460 ml   Output    Net 460 ml       Physical Exam:  General Appearance: awake, appears stated age and cooperative  Head: Normocephalic, without obvious abnormality, atraumatic  Extremities: moves all extremities  Skin: Skin color and temperature normal    Pulm: no labored breathing    Lab, Imaging and other studies: I have personally reviewed pertinent reports  POC Glucose (mg/dl)   Date Value   03/01/2020 337 (H)   03/01/2020 376 (H)   02/29/2020 235 (H)   02/29/2020 77   02/29/2020 75   02/29/2020 215 (H)   02/29/2020 306 (H)   02/29/2020 242 (H)   02/28/2020 126   02/28/2020 133       Assessment:  diabetes with hyperglycemia    Plan:  Type 1 diabetes uncontrolled with hypoglycemia and hyperglycemia complicated by end-stage renal disease currently on peritoneal dialysis and gastroparesis - patient is using dexcom  He continues to have elevated blood sugars in the morning even though he had been on his regular dextrose content of his peritoneal dialysis solution  Patient admits to increased appetite over the past couple of days  I will increase Lantus to 16 units at bedtime and continue Humalog 4 units with meals       End-stage renal disease on peritoneal dialysis - nephrology following    Secondary hyperparathyroidism secondary to end-stage renal disease - continue Sensipar    Portions of the record may have been created with voice recognition software

## 2020-03-01 NOTE — PROGRESS NOTES
NEPHROLOGY PROGRESS NOTE   Asim Mckeon 37 y o  male MRN: [id number]  Unit/Bed#: -39 Encounter: [id number]  Reason for Consult:  End-stage renal disease    ASSESSMENT/PLAN:  1  End-stage renal disease, currently on CCPD, 10 hours, 12 5 L, exchange volumes of 2 2 L with the last fill of 1 5 L    2  Accelerated hypertension, overall blood pressure significantly improved, continue with current regimen  3  Anemia of chronic disease, HETAL currently on hold given new onset seizure disorder as well as previous accelerated hypertension  4  Mineral bone disorder secondary to CKD, continue with calcium acetate   5  Seizure disorder, currently on valproic acid  6  History of CVA     PLAN:  · Continue with current dialysis prescription  · Weight fluctuant, doubt accuracy, ultra filtrated 1 8 L yesterday  · Blood pressure overall stable  · Check iron stores, may benefit from IV iron    SUBJECTIVE:  Seen examined  Patient feeling well  Denies any chest pain shortness of breath  Denies abdominal pain  Physical therapy is going well  Blood sugars still remain high  Review of Systems    OBJECTIVE:  Current Weight: Weight - Scale: 103 kg (228 lb)  Vitals:    02/29/20 2009 02/29/20 2057 03/01/20 0500 03/01/20 0548   BP: 155/76  159/92    BP Location: Right arm  Right arm    Pulse: 76  81 86   Resp: 20  18 20   Temp: 98 5 °F (36 9 °C)  98 3 °F (36 8 °C) 98 8 °F (37 1 °C)   TempSrc: Oral  Oral Oral   SpO2: 96%  97% 96%   Weight:  103 kg (228 lb)     Height:           Intake/Output Summary (Last 24 hours) at 3/1/2020 1104  Last data filed at 2/29/2020 1657  Gross per 24 hour   Intake 460 ml   Output    Net 460 ml       Physical Exam   Constitutional: He is oriented to person, place, and time  No distress  HENT:   Head: Normocephalic  Eyes: No scleral icterus  Neck: Neck supple  Cardiovascular: Normal rate and regular rhythm  Pulmonary/Chest: Effort normal    Abdominal: Soft  There is no tenderness  Musculoskeletal: He exhibits no edema  Neurological: He is alert and oriented to person, place, and time  Skin: Skin is warm and dry  No rash noted         Medications:    Current Facility-Administered Medications:     acetaminophen (TYLENOL) tablet 650 mg, 650 mg, Oral, Q8H PRN, Shayla Plata MD    aspirin chewable tablet 81 mg, 81 mg, Oral, Daily, Mateo Vincent MD, 81 mg at 03/01/20 0846    atorvastatin (LIPITOR) tablet 40 mg, 40 mg, Oral, HS, Mateo Vincent MD, 40 mg at 02/29/20 2217    calcium acetate (PHOSLO) capsule 667 mg, 667 mg, Oral, TID With Meals, Shayla Plata MD, 667 mg at 03/01/20 0849    chlorhexidine (PERIDEX) 0 12 % oral rinse 15 mL, 15 mL, Swish & Spit, Q12H [address], Shayla Plata MD, 15 mL at 03/01/20 0846    cholecalciferol (VITAMIN D3) tablet 2,000 Units, 2,000 Units, Oral, Daily, Shayla Plata MD, 2,000 Units at 03/01/20 0846    cinacalcet (SENSIPAR) tablet 90 mg, 90 mg, Oral, Daily, Shayla Plata MD, 90 mg at 03/01/20 0851    cloNIDine (CATAPRES) tablet 0 1 mg, 0 1 mg, Oral, Q8H [address], Mateo Vincent MD, 0 1 mg at 03/01/20 0533    dextrose 50 % IV solution 25 g, 25 g, Intravenous, PRN, Shayla Plata MD    diphenhydrAMINE (BENADRYL) 50 mg in sodium chloride 0 9 % 50 mL IVPB, 50 mg, Intravenous, Daily PRN, Shayla Plata MD    doxazosin (CARDURA) tablet 2 mg, 2 mg, Oral, HS, Mateo Vincent MD, 2 mg at 02/29/20 2217    gabapentin (NEURONTIN) capsule 100 mg, 100 mg, Oral, HS, Mateo Vincent MD, 100 mg at 02/29/20 2217    gentamicin (GARAMYCIN) 0 1 % ointment 1 application, 1 application, Topical, Daily, Mateo Vincent MD, 1 application at 49/97/75 2217    heparin (porcine) subcutaneous injection 5,000 Units, 5,000 Units, Subcutaneous, Q8H [address], Mateo Vincent MD, 5,000 Units at 03/01/20 0534    hydrALAZINE (APRESOLINE) injection 20 mg, 20 mg, Intravenous, Q4H PRN, Mateo Vincent MD    hydrALAZINE (APRESOLINE) tablet 50 mg, 50 mg, Oral, TID, Xavi Hernandes MD, 50 mg at 03/01/20 0847    insulin glargine (LANTUS) subcutaneous injection 14 Units 0 14 mL, 14 Units, Subcutaneous, HS, Xavi Hernandes MD, 14 Units at 02/29/20 2219    insulin lispro (HumaLOG) 100 units/mL subcutaneous injection 1-5 Units, 1-5 Units, Subcutaneous, TID AC, 3 Units at 03/01/20 0848 **AND** Fingerstick Glucose (POCT), , , TID AC, Mateo Vincent MD    insulin lispro (HumaLOG) 100 units/mL subcutaneous injection 1-5 Units, 1-5 Units, Subcutaneous, HS, Mateo Vincent MD, 2 Units at 02/29/20 2220    insulin lispro (HumaLOG) 100 units/mL subcutaneous injection 4 Units, 4 Units, Subcutaneous, TID With Meals, Xavi Hernandes MD, 4 Units at 03/01/20 0848    labetalol (NORMODYNE) tablet 300 mg, 300 mg, Oral, Q12H [address], Mateo Vincent MD, 300 mg at 03/01/20 0847    Labetalol HCl (NORMODYNE) injection 20 mg, 20 mg, Intravenous, Q2H PRN, Xavi Hernandes MD    lactobacillus acidophilus-bulgaricus (FLORANEX) packet 1 packet, 1 packet, Oral, HS, Xavi Hernandes MD, 1 packet at 02/29/20 2215    lisinopril (ZESTRIL) tablet 40 mg, 40 mg, Oral, Daily, Mateo Vincent MD, 40 mg at 03/01/20 0846    NIFEdipine (PROCARDIA XL) 24 hr tablet 60 mg, 60 mg, Oral, BID, Mateo Vincent MD, 60 mg at 03/01/20 0847    ondansetron (ZOFRAN) injection 4 mg, 4 mg, Intravenous, Q4H PRN, Mateo Vincent MD, 4 mg at 03/01/20 0905    ondansetron (ZOFRAN-ODT) dispersible tablet 4 mg, 4 mg, Oral, Q8H [address], Mateo Vincent MD, 4 mg at 03/01/20 0536    pantoprazole (PROTONIX) EC tablet 40 mg, 40 mg, Oral, Early Morning, Mateo Vincent MD, 40 mg at 03/01/20 0533    torsemide (DEMADEX) tablet 100 mg, 100 mg, Oral, Daily With Lunch, Mateo Vincent MD, 100 mg at 02/29/20 1301    valproic acid (DEPAKENE) capsule 750 mg, 750 mg, Oral, Q12H [address], Mateo Vincent MD, 750 mg at 03/01/20 0850    Laboratory Results:  Results from last 7 days   Lab Units 02/26/20  0500 02/25/20  0759 02/24/20  0601   WBC Thousand/uL  --  8 70 9 90   HEMOGLOBIN g/dL  --  8 6* 9 1*   HEMATOCRIT %  --  25 4* 28 1*   PLATELETS Thousands/uL  --  249 242   POTASSIUM mmol/L 3 8 4 1 3 7   CHLORIDE mmol/L 98* 99* 105   CO2 mmol/L 22 22 23   BUN mg/dL 50* 47* 47*   CREATININE mg/dL 14 50* 14 50* 15 40*   CALCIUM mg/dL 8 9 8 8 9 7   PHOSPHORUS mg/dL  --   --  8 8*

## 2020-03-02 LAB
ALBUMIN SERPL BCP-MCNC: 2.5 G/DL (ref 3.5–5)
ANION GAP SERPL CALCULATED.3IONS-SCNC: 13 MMOL/L (ref 4–13)
BUN SERPL-MCNC: 61 MG/DL (ref 5–25)
CALCIUM SERPL-MCNC: 8.7 MG/DL (ref 8.3–10.1)
CHLORIDE SERPL-SCNC: 94 MMOL/L (ref 100–108)
CO2 SERPL-SCNC: 25 MMOL/L (ref 21–32)
CREAT SERPL-MCNC: 13.7 MG/DL (ref 0.6–1.3)
ERYTHROCYTE [DISTWIDTH] IN BLOOD BY AUTOMATED COUNT: 13.7 % (ref 11.6–15.1)
FERRITIN SERPL-MCNC: 766 NG/ML (ref 8–388)
GFR SERPL CREATININE-BSD FRML MDRD: 4 ML/MIN/1.73SQ M
GLUCOSE SERPL-MCNC: 150 MG/DL (ref 65–140)
GLUCOSE SERPL-MCNC: 182 MG/DL (ref 65–140)
GLUCOSE SERPL-MCNC: 190 MG/DL (ref 65–140)
GLUCOSE SERPL-MCNC: 214 MG/DL (ref 65–140)
GLUCOSE SERPL-MCNC: 296 MG/DL (ref 65–140)
HCT VFR BLD AUTO: 27.2 % (ref 36.5–49.3)
HGB BLD-MCNC: 9.1 G/DL (ref 12–17)
IRON SATN MFR SERPL: 84 %
IRON SERPL-MCNC: 136 UG/DL (ref 65–175)
MCH RBC QN AUTO: 31.6 PG (ref 26.8–34.3)
MCHC RBC AUTO-ENTMCNC: 33.5 G/DL (ref 31.4–37.4)
MCV RBC AUTO: 94 FL (ref 82–98)
PHOSPHATE SERPL-MCNC: 5.6 MG/DL (ref 2.7–4.5)
PLATELET # BLD AUTO: 327 THOUSANDS/UL (ref 149–390)
PMV BLD AUTO: 10.2 FL (ref 8.9–12.7)
POTASSIUM SERPL-SCNC: 4 MMOL/L (ref 3.5–5.3)
RBC # BLD AUTO: 2.88 MILLION/UL (ref 3.88–5.62)
SODIUM SERPL-SCNC: 132 MMOL/L (ref 136–145)
TIBC SERPL-MCNC: 162 UG/DL (ref 250–450)
WBC # BLD AUTO: 6.36 THOUSAND/UL (ref 4.31–10.16)

## 2020-03-02 PROCEDURE — 97110 THERAPEUTIC EXERCISES: CPT

## 2020-03-02 PROCEDURE — 99232 SBSQ HOSP IP/OBS MODERATE 35: CPT | Performed by: INTERNAL MEDICINE

## 2020-03-02 PROCEDURE — 97535 SELF CARE MNGMENT TRAINING: CPT

## 2020-03-02 PROCEDURE — 97530 THERAPEUTIC ACTIVITIES: CPT

## 2020-03-02 PROCEDURE — 80069 RENAL FUNCTION PANEL: CPT | Performed by: INTERNAL MEDICINE

## 2020-03-02 PROCEDURE — 97112 NEUROMUSCULAR REEDUCATION: CPT

## 2020-03-02 PROCEDURE — 82948 REAGENT STRIP/BLOOD GLUCOSE: CPT

## 2020-03-02 PROCEDURE — 82728 ASSAY OF FERRITIN: CPT | Performed by: INTERNAL MEDICINE

## 2020-03-02 PROCEDURE — 97129 THER IVNTJ 1ST 15 MIN: CPT

## 2020-03-02 PROCEDURE — 97116 GAIT TRAINING THERAPY: CPT

## 2020-03-02 PROCEDURE — 83550 IRON BINDING TEST: CPT | Performed by: INTERNAL MEDICINE

## 2020-03-02 PROCEDURE — 83540 ASSAY OF IRON: CPT | Performed by: INTERNAL MEDICINE

## 2020-03-02 PROCEDURE — 99232 SBSQ HOSP IP/OBS MODERATE 35: CPT | Performed by: PHYSICAL MEDICINE & REHABILITATION

## 2020-03-02 PROCEDURE — 85027 COMPLETE CBC AUTOMATED: CPT | Performed by: INTERNAL MEDICINE

## 2020-03-02 RX ADMIN — ATORVASTATIN CALCIUM 40 MG: 40 TABLET, FILM COATED ORAL at 21:19

## 2020-03-02 RX ADMIN — INSULIN LISPRO 1 UNITS: 100 INJECTION, SOLUTION INTRAVENOUS; SUBCUTANEOUS at 21:18

## 2020-03-02 RX ADMIN — HEPARIN SODIUM 5000 UNITS: 5000 INJECTION INTRAVENOUS; SUBCUTANEOUS at 06:33

## 2020-03-02 RX ADMIN — ONDANSETRON 4 MG: 4 TABLET, ORALLY DISINTEGRATING ORAL at 14:35

## 2020-03-02 RX ADMIN — CALCIUM ACETATE 667 MG: 667 CAPSULE ORAL at 11:43

## 2020-03-02 RX ADMIN — NIFEDIPINE 60 MG: 60 TABLET, FILM COATED, EXTENDED RELEASE ORAL at 18:07

## 2020-03-02 RX ADMIN — VALPROIC ACID 750 MG: 250 CAPSULE, LIQUID FILLED ORAL at 08:15

## 2020-03-02 RX ADMIN — CLONIDINE HYDROCHLORIDE 0.1 MG: 0.1 TABLET ORAL at 06:34

## 2020-03-02 RX ADMIN — ONDANSETRON 4 MG: 4 TABLET, ORALLY DISINTEGRATING ORAL at 06:33

## 2020-03-02 RX ADMIN — MELATONIN 2000 UNITS: at 08:11

## 2020-03-02 RX ADMIN — HEPARIN SODIUM: 1000 INJECTION INTRAVENOUS; SUBCUTANEOUS at 22:02

## 2020-03-02 RX ADMIN — INSULIN LISPRO 1 UNITS: 100 INJECTION, SOLUTION INTRAVENOUS; SUBCUTANEOUS at 16:32

## 2020-03-02 RX ADMIN — HYDRALAZINE HYDROCHLORIDE 50 MG: 50 TABLET, FILM COATED ORAL at 08:10

## 2020-03-02 RX ADMIN — INSULIN LISPRO 4 UNITS: 100 INJECTION, SOLUTION INTRAVENOUS; SUBCUTANEOUS at 08:00

## 2020-03-02 RX ADMIN — CHLORHEXIDINE GLUCONATE 0.12% ORAL RINSE 15 ML: 1.2 LIQUID ORAL at 21:18

## 2020-03-02 RX ADMIN — DOXAZOSIN 2 MG: 2 TABLET ORAL at 21:19

## 2020-03-02 RX ADMIN — CHLORHEXIDINE GLUCONATE 0.12% ORAL RINSE 15 ML: 1.2 LIQUID ORAL at 08:09

## 2020-03-02 RX ADMIN — LABETALOL HCL 300 MG: 200 TABLET, FILM COATED ORAL at 21:19

## 2020-03-02 RX ADMIN — HEPARIN SODIUM: 1000 INJECTION INTRAVENOUS; SUBCUTANEOUS at 22:01

## 2020-03-02 RX ADMIN — ASPIRIN 81 MG 81 MG: 81 TABLET ORAL at 08:10

## 2020-03-02 RX ADMIN — PANTOPRAZOLE SODIUM 40 MG: 40 TABLET, DELAYED RELEASE ORAL at 06:33

## 2020-03-02 RX ADMIN — TORSEMIDE 100 MG: 20 TABLET ORAL at 11:43

## 2020-03-02 RX ADMIN — CALCIUM ACETATE 667 MG: 667 CAPSULE ORAL at 08:00

## 2020-03-02 RX ADMIN — LISINOPRIL 40 MG: 20 TABLET ORAL at 08:09

## 2020-03-02 RX ADMIN — LACTOBACILLUS ACIDOPHILUS / LACTOBACILLUS BULGARICUS 1 PACKET: 100 MILLION CFU STRENGTH GRANULES at 22:31

## 2020-03-02 RX ADMIN — GABAPENTIN 100 MG: 100 CAPSULE ORAL at 21:19

## 2020-03-02 RX ADMIN — ONDANSETRON 4 MG: 4 TABLET, ORALLY DISINTEGRATING ORAL at 21:19

## 2020-03-02 RX ADMIN — HEPARIN SODIUM 5000 UNITS: 5000 INJECTION INTRAVENOUS; SUBCUTANEOUS at 14:34

## 2020-03-02 RX ADMIN — LABETALOL HCL 300 MG: 200 TABLET, FILM COATED ORAL at 08:10

## 2020-03-02 RX ADMIN — INSULIN LISPRO 1 UNITS: 100 INJECTION, SOLUTION INTRAVENOUS; SUBCUTANEOUS at 07:59

## 2020-03-02 RX ADMIN — HYDRALAZINE HYDROCHLORIDE 50 MG: 50 TABLET, FILM COATED ORAL at 16:30

## 2020-03-02 RX ADMIN — CALCIUM ACETATE 667 MG: 667 CAPSULE ORAL at 16:30

## 2020-03-02 RX ADMIN — CINACALCET HYDROCHLORIDE 90 MG: 30 TABLET, FILM COATED ORAL at 08:15

## 2020-03-02 RX ADMIN — NIFEDIPINE 60 MG: 60 TABLET, FILM COATED, EXTENDED RELEASE ORAL at 08:10

## 2020-03-02 RX ADMIN — CLONIDINE HYDROCHLORIDE 0.1 MG: 0.1 TABLET ORAL at 21:19

## 2020-03-02 RX ADMIN — VALPROIC ACID 750 MG: 250 CAPSULE, LIQUID FILLED ORAL at 22:31

## 2020-03-02 RX ADMIN — INSULIN GLARGINE 16 UNITS: 100 INJECTION, SOLUTION SUBCUTANEOUS at 21:18

## 2020-03-02 RX ADMIN — INSULIN LISPRO 2 UNITS: 100 INJECTION, SOLUTION INTRAVENOUS; SUBCUTANEOUS at 11:43

## 2020-03-02 RX ADMIN — GENTAMICIN SULFATE 1 APPLICATION: 1 OINTMENT TOPICAL at 22:02

## 2020-03-02 RX ADMIN — CLONIDINE HYDROCHLORIDE 0.1 MG: 0.1 TABLET ORAL at 14:35

## 2020-03-02 RX ADMIN — HYDRALAZINE HYDROCHLORIDE 50 MG: 50 TABLET, FILM COATED ORAL at 21:19

## 2020-03-02 RX ADMIN — INSULIN LISPRO 4 UNITS: 100 INJECTION, SOLUTION INTRAVENOUS; SUBCUTANEOUS at 11:44

## 2020-03-02 RX ADMIN — HEPARIN SODIUM 5000 UNITS: 5000 INJECTION INTRAVENOUS; SUBCUTANEOUS at 21:18

## 2020-03-02 NOTE — PCC CARE MANAGEMENT
Pt is participating well with therapy, and plans on a return home  Pt has supportive parents who will provide supervision is needed  Pt and his mother were educated on the potential for cont'd care, ie therapy and services  Following to assist with d/c planning needs

## 2020-03-02 NOTE — TEAM CONFERENCE
Acute RehabilitationTeam Conference Note  Date: 3/3/2020   Time: 10:56 AM       Patient Name:  Polo Candelario       Medical Record Number: [id number]   YOB: 1985  Sex:  Male          Room/Bed:  [place] [address]/[facility] 458-01  Payor Info:  Payor: Jeni Carroll / Plan: MEDICARE A AND B / Product Type: Medicare A & B Fee for Service /      Admitting Diagnosis: Seizures ([place] [address] ) [R56 9]  Hypertension [I10]   Admit Date/Time:  2/28/2020  2:17 PM  Admission Comments: No comment available     Primary Diagnosis:  Impaired mobility and ADLs  Principal Problem: Impaired mobility and ADLs    Patient Active Problem List    Diagnosis Date Noted    Impaired mobility and ADLs 02/29/2020    Left arm pain 02/29/2020    Vertigo 02/20/2020    Sepsis ([facility] ) 12/11/2019    Incidental lung nodule, > 3mm and < 8mm 12/09/2019    Elevated TSH 12/07/2019    Peripheral polyneuropathy 11/20/2019    Diabetic gastroparesis ([facility] ) 09/17/2019    Obesity (BMI 30 0-34 9) 09/09/2019    Pruritus 09/06/2019    Environmental and seasonal allergies 05/21/2019    Current moderate episode of major depressive disorder without prior episode ([facility] ) 03/08/2019    Diarrhea 11/09/2018    Strabismus 09/24/2018    Dyslipidemia 08/24/2018    Peritoneal dialysis catheter in place ([place] [address] ) 08/09/2018    Heterozygous for prothrombin c04516h mutation ([facility] ) 06/04/2018    Anemia 05/01/2018    Renovascular hypertension 03/26/2018    Left atrial dilation 02/27/2018    Bilateral leg edema 02/19/2018    End-stage renal disease on peritoneal dialysis ([facility] ) 02/11/2018    Persistent proteinuria 02/11/2018    Homozygous MTHFR mutation C677T ([facility] ) 02/05/2018    Vitamin D deficiency 01/29/2018    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,   01/28/2018     Class: Acute    Binocular visual disturbance 01/28/2018    History of lacunar cerebrovascular accident (CVA) 01/22/2018    Type 1 diabetes mellitus with hypoglycemia ([facility] ) 06/19/2017    Ataxia 07/21/2015       Physical Therapy:    Weight Bearing Status: Full Weight Bearing  Transfers: Minimal Assistance  Bed Mobility: Incidental Touching, Minimal Assistance  Amulation Distance (ft): 150 feet  Ambulation: Minimal Assistance  Assistive Device for Ambulation: Single Point Cane  Discharge Recommendations: Home with:  Leah Coates with[de-identified] 24 Hour Assisteance, 24 Hour Supervision, Family Support, Outpatient Physical Therapy, Home Physical Therapy    4/2/2020   engaged in skilled PT focus on  education with stroke and risk factors of CVA/ strokes , pt instructed with awareness and sxs if stroke, outcome and inc higher risk if having one or more stroke   Pt very impulsive with mobility attempting to move prior to therapist being within reach and prior to removal of obstacles  Pt unsafe with RW currently as he turns quickly, does not keep RW on ground  Pt seem to negotiation SPC better  with less effort, pt moves better but less stability   Pt cont to have dec blance awareness d/t vision deficits   Pt will cont to need skilled PT  to meet goals , pt limited  cosmetic gait holds LLE arm/ elbow  at 90 * during ambulation as above     Occupational Therapy:  Eating: Independent  Grooming: Minimal Assistance  Bathing: Minimal Assistance  Bathing: Minimal Assistance  Upper Body Dressing: Minimal Assistance  Lower Body Dressing: Minimal Assistance  Toileting: Minimal Assistance  Tub/Shower Transfer: Minimal Assistance  Toilet Transfer: Minimal Assistance  Cognition: Within Defined Limits  Orientation: Person, Place, Time, Situation  Discharge Recommendations: Home with:  Leah Coates with[de-identified] Outpatient Occupational Therapy, Family Support       Pt is a 37year old male who was seen for an OT evaluation following admission to [facility] due with complaints of dizziness x2 days  Patient has a history of CVA x2 with resultant chronic vertigo  Patient also states some word-finding difficulty   Prior to admission pt was completing ADL's independently, and use a SPC for community mobility  Pt was not driving, and had a life alert    Currently, pt requires Min-Mod assist for overall ADLs, and requires Min-Mod assist for transfers using RW  Pt is currently limited due to the following deficits impacting occupational performance, activity tolerance, endurance, standing balance/tolerance and sitting balance/tolerance  The patient has shown a decline from prior level of function  The patient participates in identification of personal goals to address in Occupational Therapy  Pt benefits from skilled OT services for I/ADL retraining to support the ability to safely participate in daily occupations safely and independently in the most least restrictive way  From OT standpoint, Pt demonstrates Good rehab potential to meet LTG's  Pt is a good rehab candidate, Anticipate 10-14 day length of stay  Occupational Therapy plan of care to focus on the following areas:  ADL re-training, [address] training, IADL re-training, functional transfers, standing tolerance, standing balance, DME training/education, family training/education, and EC techniques/education  Speech Therapy:           No notes on file    Nursing Notes:  Appetite: Fair  Diet Type: Diabetic, Low fat                                       Incision 08/06/18 Abdomen Other (Comment) (Active)       Incision 08/27/18 Abdomen Other (Comment) (Active)                                Pain Location: Arm  Pain Orientation: Left  Pain Score: 0                       Hospital Pain Intervention(s): Repositioned, Rest          38 yo male with PMHx of ESRD on PD, Type I DM, HTN, HPL, CVA, homozygous for C677T MTHFR mutation and heterozygote for prothombin gene mutation, who initially presented on 2/20 with dizziness, lightheaded to [facility], and had seizure initially also and was initially intubated and sedated  [facility] and care  ESRD - not anuric   Access - PD catheter, now pt is on all 1 5% dwells currently with hep in dwells due to fibrin, gent ointment to catheter P8RN manages, EDW 94 kg  Pt is on seizure precautions; on valproic acid  Mineral Bone disorder- on phos binder, sensipar, vit D  Phos 5/6 on 3/2  HTN is being treated with clonidine, hydralazine, labetalol, torsemide, doxazosin, lisinopril, nifedipine placing him at high risk of polypharmacy  If  He remains stable, may consider lowering or holding doxazosin and increasing another in 24-48 hours  Stable potassium on 3/2  Hyponatremia- 132 3/2  Monitor for now with PD  On 3/3 at 0200 pt had his mom contact the RN to check his bloodsugar which was 373  Nephrology ordered a one time order of 3units of Humalog  Pt is ordered to have Zofran q8hrs scheduled for nausea with PD, and 4mg Zofran PRN q4  We will continue to monitor daily weights at around 1500 using the standing scale  We will monitor  I/O, QID bloodsugars (pt uses Dexcam meter), and vital signs especially HTN  Case Management:     Discharge Planning  Living Arrangements: Parent  Support Systems: Parent, Family members  Assistance Needed: Unknown at this time  Type of Current Residence: Private residence  Current [facility] [address] : No  Pt is participating well with therapy, and plans on a return home  Pt has supportive parents who will provide supervision is needed  Pt and his mother were educated on the potential for cont'd care, ie therapy and services  Following to assist with d/c planning needs  Is the patient actively participating in therapies? yes  List any modifications to the treatment plan:     Barriers Interventions   Decreased sitting balance/tolerance Therapy exercises   Activity tolerance Energy conservation education   Decreased standing balance/tolerance Therapy exercises   anxiety Redirection/education/breathing techniques         Is the patient making expected progress toward goals?  yes  List any update or changes to goals:     Medical Goals: Patient will be medically stable for discharge to [facility] upon completion of rehab program and Patient will be able to manage medical conditions and comorbid conditions with medications and follow up upon completion of rehab program    Weekly Team Goals:   Rehab Team Goals  ADL Team Goal: Patient will require supervision with ADLs with least restrictive device upon completion of rehab program  Transfer Team Goal: Patient will require supervision with transfers with least restrictive device upon completion of rehab program  Locomotion Team Goal: Patient will require supervision with locomotion with least restrictive device upon completion of rehab program    Discussion: Pt presents with the above barriers  Pt is currently min a for transfers, incidental touching/min a for bed mobility, ambulating 150ft, min a with single pt cane  Pts ADLs are min a overall  Pt has had frustrations with his diabetes management while admitted, which is being addressed by Dr Adrienne Reinoso and endocrinology  Pts mother shared that Pt is on the transplant list for kidneys/pancreas, however during his stay at the [facility], it is on hold  Pt goals are supervision level  Recommendations for outpt PT/OT at [facility]  Anticipated Discharge Date:  3 5 20  [facility] Team Members Present: The following team members are supervising care for this patient and were present during this Weekly Team Conference      Physician: Dr Adrienne Reinoso MD  : SERA Arriaza/ JESSYW  Registered Nurse: Ayesha Hobson, MIKAEL  Physical Therapist: Nicki Mendoza DPT  Occupational Therapist: Judy Koo MS, OTR/L  Speech Therapist: Bhavik Garduno MS, CCC-SLP  Other:

## 2020-03-02 NOTE — NURSING NOTE
Patient noncompliant with Diabetic diet and eating increased amount of carbs all day as reported by day shift RN and PCA  Food was brought in by family  Patient requesting/demanding more insulin for elevated glucose reading from dexcom device  Blood sugar checked and 3 units of Insulin was given per order for blood glucose level of 343  Patient became agitated and began yelling when told more insulin was not ordered and diet suggestions were made  Called his mother stating that he's sorry he went to [facility]  He stated he thinks he will die here because he is not getting enough insulin(fast acting)  Also, he is not happy that the HS dose of Lantus was increased by 2 units  He stated that he is afraid to sleep because his blood sugar may drop too low  Endocrine was called and made aware of complaints  At approximately 2130 Dr Cece Singh talked  to patient and I spoke to his mother Parul Polk  She was happy to see him regain his appetite and to see him eating again  She acknowledged that he ate an increased amount of carbs all day  She stated that he is feeling out of control because he cant cover his own blood sugar as routine at home  After his conversation with Dr Chase Casillas and his mother he is settled and relaxed without further complaint  Patient sleeping now, will continue to monitor  3/2 0600 Patients mother called to say "thank you for looking out for him, this has been the best night he has had so far with a Blood sugar less than 200"

## 2020-03-02 NOTE — PROGRESS NOTES
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 ([place] [address] )    Peritoneal dialysis catheter in place ([place] [address] )    Dyslipidemia    Strabismus    Diarrhea    Current moderate episode of major depressive disorder without prior episode ([place] [address] )    Environmental and seasonal allergies    Pruritus    Obesity (BMI 30 0-34  9)    Diabetic gastroparesis (HCC)    Peripheral polyneuropathy    Elevated TSH    Incidental lung nodule, > 3mm and < 8mm    Sepsis (HCC)    Vertigo    Impaired mobility and ADLs    Left arm pain     Past Medical History:   Diagnosis Date    Acute kidney injury ([place] [address] )     Anxiety     Cerebellar stroke side undetermined 2015 2015,1/2018    Diabetes type 1, controlled ([place] [address] )     IDDM    Diarrhea     Gastroparesis     History of shingles 2010    History of transfusion 02/2018    Hypertension     Muscle weakness     general unsteadiness    Retinopathy      Etiologic Diagnosis: New onset seizures due to uncontrolled hypertension  Restrictions/Precautions  Precautions: Fall Risk, Bed/chair alarms, Supervision on toilet/commode  Weight Bearing Restrictions: No  ROM Restrictions: No  ADL Team Goal: Patient will require supervision with ADLs with least restrictive device upon completion of rehab program  Recommendation  OT Discharge Recommendation: Home with family support(pending pt progress)     03/02/20 1300   Pain Assessment   Pain Assessment No/denies pain   Pain Score No Pain Restrictions/Precautions   Precautions Fall Risk;Bed/chair alarms;Supervision on toilet/commode   Lifestyle   Service to Others currently spending time dog sitting    Intrinsic Gratification Pt enjoys spending time with neices and nephews, watching sports, playing with dogs   Tub/Shower Transfer   Findings has Tub Bench, Trial tub nshower transfer tomorrow  Putting On/Taking Off Footwear   Type of Assistance Needed Set-up / clean-up;Supervision   Comment demo ability to tie shoes, don and doff  able to bring foot over knee for donning socks  Putting On/Taking Off Footwear CARE Score 4   Sit to Stand   Type of Assistance Needed Supervision   Comment increased time from low surface  Sit to Stand CARE Score 4   Bed-Chair Transfer   Type of Assistance Needed Physical assistance   Amount of Physical Assistance Provided 25%-49%   Comment hands on hips for balance correction at times  Pt demo fair awareness of obstacles as he likes to hold them for steadying  Pt reports using SPC at home for comm mobility  Discussed use of RW at home to increase independence with parents providing assist  Discussed completion of CGA at D/C if they wish to D/C beofre goals are met  L UE comes into synergistic flexion pattern with slighty flexed wrist   Chair/Bed-to-Chair Transfer CARE Score 3   Toileting Hygiene   Comment use of BSC at night 2* PD  Functional Standing Tolerance   Time 10 min    Activity supported stance    ROM- Right Upper Extremities   R Shoulder AROM; Flexion; Extension   ROM - Left Upper Extremities    L Shoulder AROM; Flexion; Extension   LUE ROM Comment seated AROM with postural control for activation of extensors  Pt dmeo poor sitting posture, with C/O neck pain on R side  Right Upper Extremity- Strength   RUE Strength Comment R UE gross grasp 80,80,80 lbs   below age related norms   Left Upper Extremity-Strength   LUE Strength Comment L UE gross grasp 80,80,90 lbs below age related norms   Neuromuscular Education   Trunk Control Poor trunk control when UE overhead  cont training to increase core strength to sit unsupported w/ good posture  Coordination   Fine Motor 9 Hole Peg test: R UE: 35 20, L UE 36 88  below age related norms  Cognition   Overall Cognitive Status WFL   Arousal/Participation Alert; Cooperative   Attention Attends with cues to redirect   Orientation Level Oriented X4   Memory Decreased recall of precautions   Following Commands Follows multistep commands with increased time or repetition   Comments Pt engaged in [place] Cognitive Assessment (MoCA) version 8 2 Pt scored overall 27/30 indicative of Mild cognitive  impairments  Pt scoring 4/5 points visuospatial/executive function  loosing points only for number placement   3/3 for naming  6/6 scored for attention/concentration  Pt scored 2/2 points for sentence repetition  1/1 for word finding/language fluency, Pt identified 11 words  2/2 for abstract/correlational thinking  Pt scoring 3/5 points delayed recall  Memory index score 8/15  Scoring 6/6 for orientation  Pt educated on functional implications of MOCA score and acknowledged implications for rehab and discharge planning  Pt previously scoring 28/30 in 2018  Pt reports "I feel like there are some things I can not remember "   Vision   Vision Comments Soren gaspar R side double vision at times  Poor distant aquity without glasses on, able to read fine print when close with no glasses on  Additional Activities   Additional Activities Comments able to complete funcitonal ambulation down to ground floor with increased time  Pt uses SPC  Assessment   Treatment Assessment Pt participated in skilled OT treatment session with treatment focus on ADL re-training, fxnl xfers, fxnl cognition, fxnl attention, standing tolerance, standing balance, family training/education and core/trunk control  Pt tolerated session well   Continued plan of care for OT sessions to focus on the following areas: tub transfers, showering,  functional transfers, use of RW vs cane for activities  Prognosis Fair   Problem List Decreased strength;Decreased range of motion;Decreased endurance; Impaired balance;Decreased mobility; Decreased coordination; Impaired judgement;Decreased cognition;Decreased safety awareness; Impaired vision;Obesity   Plan   Treatment/Interventions ADL retraining;Functional transfer training;LE strengthening/ROM; Therapeutic exercise; Endurance training;Cognitive reorientation;Patient/family training;Equipment eval/education; Bed mobility; Compensatory technique education   Progress Progressing toward goals   OT Therapy Minutes   OT Time In 1300   OT Time Out 1430   OT Total Time (minutes) 90   OT Mode of treatment - Individual (minutes) 90   OT Mode of treatment - Concurrent (minutes) 0   OT Mode of treatment - Group (minutes) 0   OT Mode of treatment - Co-treat (minutes) 0   OT Mode of Treatment - Total time(minutes) 90 minutes   OT Cumulative Minutes 240

## 2020-03-02 NOTE — PROGRESS NOTES
Physical Medicine and Rehabilitation Progress Note  Savanna Mckeon 37 y o  male MRN: [id number]  Unit/Bed#: -44 Encounter: [id number]    HPI: Polo Candelario is a 37 y o  male who presented to the [facility] with status epilepticus on admission  Given ativan, versed and depacon on admission with no break in seizures  Resulted in patient being intubated and started on propofol drip  It was believed seizures were secondary to uncontrolled HTN  Patient was started on video EEG  Seizures did resolve, and patient able to be extubated  Accepted to [facility] on 2/28/20       Chief Complaint: seizures    Interval: Patient frustrated due to high blood sugars over this weekend  Patient upset that he was told he could not have cookies at bedside; he states he is only eating them to control low blood sugars and prefers to have them at arms reach  IM has notified endocrinology who will stop by today to discuss with patient  Mother at beside  ROS: A 10 point ROS was performed; negative except as noted above  Assessment/Plan:    * Impaired mobility and ADLs  Assessment & Plan  · Acute comprehensive interdisciplinary inpatient rehabilitation including PT, OT, RN, CM, SW, dietary, psychology, etc   · Goal: modified independent    Left arm pain  Assessment & Plan  · Imaging reveals no acute fracture, but patient noted to have joint effusion, as well as "Significant joint space narrowing or bony erosion   Olecranon enthesopathy noted"  · Will require orthopedic f/u    Peripheral polyneuropathy  Assessment & Plan  · Continue gabapentin    Diabetic gastroparesis ([address] )  Assessment & Plan  · Per patient and family, improved with smaller, frequent meals    Dyslipidemia  Assessment & Plan  · Continue statin    Peritoneal dialysis catheter in place [facility])  Assessment & Plan  Results from last 7 days   Lab Units 03/02/20  0636 02/26/20  0500 02/25/20  0759   CREATININE mg/dL 13 70* 14 50* 14 50* · Continue monitoring kidney function via intermittent BMP  · Avoid nephrotoxic meds/NSAIDs  · Patient performs PD at home  · Nephrology team following, appreciate recs      Anemia  Assessment & Plan  Results from last 7 days   Lab Units 03/02/20  0636 02/25/20  0759   HEMOGLOBIN g/dL 9 1* 8 6*     · Monitor CBC intermittently  · Transfuse for Hgb <7      Renovascular hypertension  Assessment & Plan  Temp:  [98 4 °F (36 9 °C)-98 7 °F (37 1 °C)] 98 4 °F (36 9 °C)  HR:  [75-85] 85  Resp:  [16-20] 20  BP: (143-169)/(65-90) 162/90    · IM/nephrology service managing anti-hypertensives, adjusting as needed    Type 1 diabetes mellitus with hypoglycemia [facility])  Assessment & Plan    Recent Labs     03/01/20  1734 03/01/20  2039 03/02/20  0640 03/02/20  1055   POCGLU 279* 343* 190* 296*     · Last A1C: 5 6  · Continue diabetic diet  · Continue SSI  · On Lantus, Humalog   · Endocrinology monitoring blood sugars, adjusting regimen as needed  dvtppx: heparin, scds   Scheduled Meds:    Current Facility-Administered Medications:  acetaminophen 650 mg Oral Q8H PRN Mateo Vincent MD   aspirin 81 mg Oral Daily Mateo Vincent MD   atorvastatin 40 mg Oral HS Mateo Vincent MD   calcium acetate 667 mg Oral TID With Meals Mateo Vincent MD   chlorhexidine 15 mL Swish & Spit Q12H [address] Pablo Camacho MD   cholecalciferol 2,000 Units Oral Daily Pablo Camacho MD   cinacalcet 90 mg Oral Daily Mateo Vincent MD   cloNIDine 0 1 mg Oral Q8H [address] Mateo Vincent MD   dextrose 25 g Intravenous PRN Mateo Vincent MD   diphenhydrAMINE (BENADRYL) IVPB 50 mg Intravenous Daily PRN Mateo Vincent MD   doxazosin 2 mg Oral HS Mateo Vincent MD   gabapentin 100 mg Oral HS Mateo Vincent MD   gentamicin 1 application Topical Daily Mateo Vincent MD   heparin (porcine) 5,000 Units Subcutaneous Q8H [address] Mateo Vincent MD   hydrALAZINE 20 mg Intravenous Q4H PRN Mateo Vincent MD   hydrALAZINE 50 mg Oral TID Pablo Camacho MD insulin glargine 16 Units Subcutaneous HS Eli Pulido MD   insulin lispro 1-5 Units Subcutaneous TID AC Mateo Vincent MD   insulin lispro 1-5 Units Subcutaneous HS Mateo Vincent MD   insulin lispro 6 Units Subcutaneous TID With Meals Sin Galeana MD   labetalol 300 mg Oral Q12H [address] Mateo Vincent MD   Labetalol HCl 20 mg Intravenous Q2H PRN Mateo Vincent MD   lactobacillus acidophilus-bulgaricus 1 packet Oral HS Mateo Vincent MD   lisinopril 40 mg Oral Daily Mateo Vincent MD   NIFEdipine 60 mg Oral BID Mateo Vincent MD   ondansetron 4 mg Intravenous Q4H PRN Mateo Vincent MD   ondansetron 4 mg Oral Q8H [address] Mateo Vincent MD   pantoprazole 40 mg Oral Early Morning Neda Askew MD   torsemide 100 mg Oral Daily With Olivier Arnett MD   valproic acid 750 mg Oral Q12H [address] Mateo Vincent MD        Objective:    Functional Update:  Mobility: min assist  Transfers: supervision  ADLs: supervision-min assist    Allergies per EMR    Physical Exam:  Temp:  [98 4 °F (36 9 °C)-98 7 °F (37 1 °C)] 98 4 °F (36 9 °C)  HR:  [75-85] 85  Resp:  [16-20] 20  BP: (143-169)/(65-90) 162/90  SpO2:  [95 %-98 %] 96 %    General: alert, no apparent distress, cooperative and comfortable  HEENT:  Head: Normocephalic, no lesions, without obvious abnormality  Eye: Normal external eye, conjunctiva, lidsc cornea  Ears: Normal external ears  Nose: Normal external nose, mucus membranes  LUNGS:  no abnormal respiratory pattern, no retractions noted, non-labored breathing   ABDOMEN:  soft, non-tender, non-distended  EXTREMITIES:  edema: 1+ to bilateral LEs  NEURO:  clear speech, following all commands, oriented x4  PSYCH:  Affect: euthymic    Physical examination is otherwise unchanged from previous encounter, except as noted above      Diagnostic Studies: Reviewed, no new imaging  No orders to display     Laboratory: Reviewed  Results from last 7 days   Lab Units 03/02/20  0636 02/25/20  0624 HEMOGLOBIN g/dL 9 1* 8 6*   HEMATOCRIT % 27 2* 25 4*   WBC Thousand/uL 6 36 8 70     Results from last 7 days   Lab Units 03/02/20  0636 02/26/20  0500 02/25/20  0759   BUN mg/dL 61* 50* 47*   SODIUM mmol/L 132* 134* 134*   POTASSIUM mmol/L 4 0 3 8 4 1   CHLORIDE mmol/L 94* 98* 99*   CREATININE mg/dL 13 70* 14 50* 14 50*            ** Please Note: Fluency Direct voice to text software may have been used in the creation of this document   **

## 2020-03-02 NOTE — PROGRESS NOTES
03/02/20 0945   Pain Assessment   Pain Assessment 0-10   Pain Score 4   Restrictions/Precautions   Precautions Cognitive; Fall Risk;Supervision on toilet/commode;Visual deficit; Seizure   Cognition   Overall Cognitive Status Impaired   Subjective   Subjective pt reports would like to walk around is room ,pt inform about safety and fall risk and instructed no walking w/o staff , pt show fair understanding about calling before walking and pt on chair abd bed alarm   Care giver in room his mother    Sit to Stand   Type of Assistance Needed Supervision   Sit to Stand CARE Score 4   Bed-Chair Transfer   Type of Assistance Needed Physical assistance   Amount of Physical Assistance Provided 25%-49%   Chair/Bed-to-Chair Transfer CARE Score 3   Car Transfer   Type of Assistance Needed Physical assistance   Amount of Physical Assistance Provided 25%-49%   Car Transfer CARE Score 3   Walk 10 Feet   Type of Assistance Needed Physical assistance   Amount of Physical Assistance Provided 50%-74%   Walk 10 Feet CARE Score 2   Walk 50 Feet with Two Turns   Type of Assistance Needed Physical assistance   Amount of Physical Assistance Provided 50%-74%   Walk 50 Feet with Two Turns CARE Score 2   Walk 150 Feet   Type of Assistance Needed Physical assistance   Amount of Physical Assistance Provided 50%-74%   Walk 150 Feet CARE Score 2   Ambulation   Does the patient walk? 2  Yes   Primary Mode of Locomotion Prior to Admission Walk   Distance Walked (feet) 150 ft  (x3)   Assist Device Roller Walker;Cane   Gait Pattern Ataxic; Inconsistant Marcelle;Decreased foot clearance; Improper weight shift   Limitations Noted In Balance; Endurance; Heel Strike;Posture; Safety; Sequencing;Speed;Strength   Provided Assistance with: Direction;Balance   Walk Assist Level Minimum Assist;Contact Guard   Findings with SPC Ba  and needs direction cueing d/t vision deficits    Curb or Single Stair   Style negotiated Single stair   Type of Assistance Needed Physical assistance   Amount of Physical Assistance Provided 25%-49%   1 Step (Curb) CARE Score 3   4 Steps   Type of Assistance Needed Physical assistance   Amount of Physical Assistance Provided 25%-49%   4 Steps CARE Score 3   Therapeutic Interventions   Strengthening STS x10   Flexibility PROM BL LE    Balance Static standing with SPC and no AD ,    Equipment Use   NuStep 10 min LVL 2   Assessment   Treatment Assessment pt engaged in skilled PT focus on  education with stroke and risk factors of CVA/ strokes , pt instructed with awareness and sxs if stroke, outcome and inc higher risk if having one or more stroke   Pt very impulsive with mobility attempting to move prior to therapist being within reach and prior to removal of obstacles  Pt unsafe with RW currently as he turns quickly, does not keep RW on ground  Pt seem to negotiation SPC better  with less effort, pt moves better but less stability   Pt cont to have dec blance awareness d/t vision deficits   Pt will cont to need skilled PT  to meet goals , pt limited  cosmetic gait holds LLE arm/ elbow  at 90 * during ambulation as above   Barriers to Discharge Decreased caregiver support   Plan   Progress Progressing toward goals   PT Therapy Minutes   PT Time In 0945   PT Time Out 1115   PT Total Time (minutes) 90   PT Mode of treatment - Individual (minutes) 75   PT Mode of treatment - Concurrent (minutes) 15   PT Mode of treatment - Group (minutes) 0   PT Mode of treatment - Co-treat (minutes) 0   PT Mode of Treatment - Total time(minutes) 90 minutes   PT Cumulative Minutes 180   Therapy Time missed   Time missed?  No

## 2020-03-02 NOTE — PROGRESS NOTES
Assessment:  57-year-old type 1 diabetic with multiple complications including peritoneal dialysis and gastroparesis  Plan:  1  Type 1 diabetes with hyperglycemia  He reports to increase oral intake compared to last week  Overnight blood glucose was in the 150-340 range  He does admit to intermittent snacks  He will get dextrose 1 25 % bath with peritoneal dialysis today  Continue Lantus 16 units at bedtime and increase mealtime insulin to 6 units with each meal   Reviewed his dex com glucose monitoring system  Since his last dose of 6 units, his blood glucose is improving  Continue correctional insulin along with his mealtime coverage  Discussed with mother at bedside  S:  Reports eating well compared to last week  O:  Patient seen and examined personally  /78 (BP Location: Left arm)   Pulse 76   Temp 98 3 °F (36 8 °C) (Oral)   Resp 18   Ht 5' 11" (1 803 m)   Wt 105 kg (230 lb 6 4 oz)   SpO2 97%   BMI 32 13 kg/m²     Physical Exam   Constitutional: He is oriented to person, place, and time  HENT:   Head: Normocephalic  Mouth/Throat: Oropharynx is clear and moist    Eyes: Pupils are equal, round, and reactive to light  EOM are normal    Musculoskeletal: He exhibits no deformity  Neurological: He is alert and oriented to person, place, and time  Vitals reviewed          Current Facility-Administered Medications:  acetaminophen 650 mg Oral Q8H PRN Harish Best MD   aspirin 81 mg Oral Daily Mateo Vincent MD   atorvastatin 40 mg Oral HS Mateo Vincent MD   calcium acetate 667 mg Oral TID With Meals Harish Best MD   chlorhexidine 15 mL Swish & Spit Q12H [address] MD Jesi   cholecalciferol 2,000 Units Oral Daily Harish Best MD   cinacalcet 90 mg Oral Daily Mateo Vincent MD   cloNIDine 0 1 mg Oral Q8H [address] Mateo Vincent MD   dextrose 25 g Intravenous PRN Harish Best MD   peritoneal dialysis fluid builder  Dialysis Once Edward Feliciano DO peritoneal dialysis fluid builder  Dialysis Once Lonzell Closs, DO   peritoneal dialysis fluid builder  Dialysis Once Lonzell Closs, DO   diphenhydrAMINE (BENADRYL) IVPB 50 mg Intravenous Daily PRN Aldo Carrera MD   doxazosin 2 mg Oral HS Mateo Vincent MD   gabapentin 100 mg Oral HS Aldo Carrera MD   gentamicin 1 application Topical Daily Aldo Carrera MD   heparin (porcine) 5,000 Units Subcutaneous Q8H [address] Mateo Vincent MD   hydrALAZINE 20 mg Intravenous Q4H PRN Mateo Vincent MD   hydrALAZINE 50 mg Oral TID Aldo Carrera MD   insulin glargine 16 Units Subcutaneous HS Fidel Stevens MD   insulin lispro 1-5 Units Subcutaneous TID AC Mateo Vincent MD   insulin lispro 1-5 Units Subcutaneous HS Mateo Vincent MD   insulin lispro 6 Units Subcutaneous TID With Meals Lizette Sparks MD   labetalol 300 mg Oral Q12H [address] Mateo Vincent MD   Labetalol HCl 20 mg Intravenous Q2H PRN Mateo Vincent MD   lactobacillus acidophilus-bulgaricus 1 packet Oral HS Mateo Vincent MD   lisinopril 40 mg Oral Daily Mateo Vincent MD   NIFEdipine 60 mg Oral BID Mateo Vincent MD   ondansetron 4 mg Intravenous Q4H PRN Mateo Vincent MD   ondansetron 4 mg Oral Q8H [address] Mateo Vincent MD   pantoprazole 40 mg Oral Early Morning Mateo Vincent MD   torsemide 100 mg Oral Daily With Lunch Aldo Carrera MD   valproic acid 750 mg Oral Q12H [address] Aldo Carrera MD        Lab, Imaging and other studies:   POC Glucose (mg/dl)   Date Value   03/02/2020 150 (H)   03/02/2020 296 (H)   03/02/2020 190 (H)   03/01/2020 343 (H)   03/01/2020 279 (H)   03/01/2020 129   03/01/2020 95   03/01/2020 337 (H)   03/01/2020 376 (H)   02/29/2020 235 (H)

## 2020-03-02 NOTE — PROGRESS NOTES
Nurse called me that patient is very upset that his blood sugar is 343 and he is getting only 3 units of correctional insulin  Nurse mentioned that patient's mother brought pizza and patient also ate 3 cookies tonight  Patient is noncompliant with hospital carb-controlled diet  He is not interested to be seen by a nutritionist  He told me 3 cookies cannot give him blood sugar of 343  He also voiced that he is not happy with [facility] and will be looking into transferring into another rehab  facility

## 2020-03-02 NOTE — PROGRESS NOTES
Internal Medicine Progress Note  Patient: Renetta Lomeli  Age/sex: 37 y o  male  Medical Record #: [id number]      ASSESSMENT/PLAN: (Interval History)  Renetta Lomeli is seen and examined and management for following issues:    New seizures  · Had status epilepticus on admission  · Per Neurology strong suspicion for provoked seizure in the setting of uncontrolled HTN  · Continue VPA  · Outpt follow-up with Epilepsy team      HTN  · Stable  · Continue current medications  · Nephrology was managing inpatient  DM type 1  · HA1C 5 6  · Continue insulin as ordered per Endocrinology  · Continue SSI and accuchecks  Adjust medications as needed  · Family bringing in food such as peanut M&Ms and Girl  cookies to treat hypoglycemia  ESRD on peritoneal dialysis  · Nephrology managing  · Adjustments made to fluids used during treatments     Diabetic gastroparesis  · Suspected etiology of nausea and emesis  · Continue Protonix and ATC Zofran  · Outpt GI follow-up  History of cerebellar stroke  · Continue ASA 81mg daily  · Continue risk factor modification     Anemia of chronic disease  · Monitor cbc  · Transfusion per renal      The above assessment and plan was reviewed and updated as determined by my evaluation of the patient on 3/2/2020      Labs:   Results from last 7 days   Lab Units 03/02/20  0636 02/25/20  0759   WBC Thousand/uL 6 36 8 70   HEMOGLOBIN g/dL 9 1* 8 6*   HEMATOCRIT % 27 2* 25 4*   PLATELETS Thousands/uL 327 249     Results from last 7 days   Lab Units 03/02/20  0636 02/26/20  0500   SODIUM mmol/L 132* 134*   POTASSIUM mmol/L 4 0 3 8   CHLORIDE mmol/L 94* 98*   CO2 mmol/L 25 22   BUN mg/dL 61* 50*   CREATININE mg/dL 13 70* 14 50*   CALCIUM mg/dL 8 7 8 9             Results from last 7 days   Lab Units 03/02/20  0640 03/01/20  2039 03/01/20  1734   POC GLUCOSE mg/dl 190* 343* 279*       Review of Scheduled Meds:    Current Facility-Administered Medications:  acetaminophen 650 mg Oral Q8H PRN Nehemias Monzon MD   aspirin 81 mg Oral Daily Mateo Vincent MD   atorvastatin 40 mg Oral HS Mateo Vincent MD   calcium acetate 667 mg Oral TID With Meals Nehemias Monzon MD   chlorhexidine 15 mL Swish & Spit Q12H [address] Nehemias Monzon MD   cholecalciferol 2,000 Units Oral Daily Nehemias Monzon MD   cinacalcet 90 mg Oral Daily Nehemias Monzon MD   cloNIDine 0 1 mg Oral Q8H [address] Mateo Vincent MD   dextrose 25 g Intravenous PRN Mateo Vincent MD   diphenhydrAMINE (BENADRYL) IVPB 50 mg Intravenous Daily PRN Mateo Vincent MD   doxazosin 2 mg Oral HS Mateo Vincent MD   gabapentin 100 mg Oral HS Nehemias Monzon MD   gentamicin 1 application Topical Daily Mateo Vincent MD   heparin (porcine) 5,000 Units Subcutaneous Q8H [address] Matoe Vincent MD   hydrALAZINE 20 mg Intravenous Q4H PRN Mateo Vincent MD   hydrALAZINE 50 mg Oral TID Nehemias Monzon MD   insulin glargine 16 Units Subcutaneous HS Fidelina Emerson MD   insulin lispro 1-5 Units Subcutaneous TID AC Mateo Vincent MD   insulin lispro 1-5 Units Subcutaneous HS Mateo Vincent MD   insulin lispro 4 Units Subcutaneous TID With Meals Mateo Vincent MD   labetalol 300 mg Oral Q12H [address] Mateo Vincent MD   Labetalol HCl 20 mg Intravenous Q2H PRN Mateo Vincent MD   lactobacillus acidophilus-bulgaricus 1 packet Oral HS Mateo Vincent MD   lisinopril 40 mg Oral Daily Mateo Vincent MD   NIFEdipine 60 mg Oral BID Mateo Vincent MD   ondansetron 4 mg Intravenous Q4H PRN Mateo Vincent MD   ondansetron 4 mg Oral Q8H [address] Mateo Vincent MD   pantoprazole 40 mg Oral Early Morning Mateo Vincent MD   torsemide 100 mg Oral Daily With Peggy Chester MD   valproic acid 750 mg Oral Q12H Meryle Cousins, MD       Subjective/ HPI: Patient seen and examined  Patients overnight issues or events were reviewed with nursing or staff during rounds or morning huddle session   New or overnight issues include the following:     Pt reports one episode of emesis yesterday AM  No further events  He is upset regarding the treatment of DM and does not see the purpose of OT  Passed pt's concerns onto Endocrinology and Dr Bianca Rubio  ROS:   A 10 point ROS was performed; negative except as noted above  Imaging:     No orders to display       *Labs /Radiology studiesReviewed  *Medications Reviewed and reconciled as needed  *Please refer to order section for additional ordered labs studies  *Case discussed with primary attending during morning huddle case rounds    Physical Examination:  Vitals:   Vitals:    03/01/20 1815 03/01/20 2000 03/02/20 0634 03/02/20 0813   BP:  160/65 169/90 162/90   BP Location:  Right arm Left arm Left arm   Pulse:  84 85    Resp:  20 20    Temp:  98 4 °F (36 9 °C) 98 4 °F (36 9 °C)    TempSrc:  Oral Oral    SpO2:  95% 96%    Weight: 102 kg (224 lb 6 4 oz)      Height:         General Appearance: no distress, conversive  HEENT: PERRLA, conjuctiva normal; oropharynx clear; mucous membranes moist;   Neck:  Supple, no lymphadenopathy or thyromegaly  Lungs: CTA, normal respiratory effort, no retractions, expiratory effort normal  CV: regular rate and rhythm , PMI normal   ABD: soft non tender, no masses , no hepatic or splenomegaly  EXT: DP pulses intact, no lymphadenopathy, no edema  Skin: normal turgor, normal texture, no rash  Psych: affect normal, mood normal  Neuro: AAOx3      The above physical exam was reviewed and updated as determined by my evaluation of the patient on 3/2/2020  Invasive Devices     Peripheral Intravenous Line            Peripheral IV 03/01/20 Dorsal (posterior); Right Hand less than 1 day                   VTE Pharmacologic Prophylaxis: Heparin  Code Status: Level 1 - Full Code  Current Length of Stay: 3 day(s)      Total time spent:  30 minutes with more than 50% spent counseling/coordinating care   Counseling includes discussion with patient re: progress  and discussion with patient of his/her current medical state/information  Coordination of patient's care was performed in conjunction with primary service  Time invested included review of patient's labs, vitals, and management of their comorbidities with continued monitoring  In addition, this patient was discussed with medical team including physician and advanced extenders  The care of the patient was extensively discussed and appropriate treatment plan was formulated unique for this patient  ** Please Note:  voice to text software may have been used in the creation of this document   Although proof errors in transcription or interpretation are a potential of such software**

## 2020-03-02 NOTE — SOCIAL WORK
Met with Pt and his mother, Mayo Earl, to review rehab routine, and CM role  Pt resides with his parents, in a two story home  There are 3STE and then a flight of 13 stairs to the second floor  Pts parents are very supportive, and can provide supervision, if necessary  CM learned that Pt was involved with [facility] in the past, however Mayo Earl nor Pt could recall the provider  Pts mother will look into it, and see if there is any documentation at home  Pt was also involved with outpt services at [facility]  Pt has the following DME: shower chair, tub bench, grab bars, single pt cane, and RW  CM explained the team meeting process, as well as potential LOS  Following to assist with d/c planning needs  CM confirmed that Pt feels his needs/issues are being addressed re: diabetic management  Mayo Earl shared with CM that Pt is on the transplant list for his kidneys and his pancreas, however this is on hold while Pt is admitted at the [facility]  Mayo Earl added that she does not wish for Pts time to be cut short unnecessarily, but she would like for staff to be aware    CM offered to share with the team

## 2020-03-02 NOTE — PROGRESS NOTES
[address] [facility] NOTE   Eleno Mckeon 37 y o  male MRN: [id number]  Unit/Bed#: -42 Encounter: [id number]  Reason for Consult:  ESRD on peritoneal dialysis    ASSESSMENT and PLAN:    38 yo male with PMHx of ESRD on PD, Type I DM, HTN, HPL, CVA, homozygous for C677T MTHFR mutation and heterozygote for prothombin gene mutation, who initially presented on 2/20 with dizziness, lightheaded to [facility], and had seizure initially also and was initially intubatedand sedated  Transferred to [facility] for further evaluation and care  1) ESRD - not anuric    - outpatient unit [facility]  - access - PD catheter  - outpatient prescription is 4 cycles of 2 2 L with last fill 1 5 L for total 10 hours  - PD prescription adjusted to 2 5% alt with 1 5% dwells alternating due to hyperglycemia and improved weight and last fill icodextran and icodextran discontinued over weekend  - now pt is on all 1 5% dwells currently with hep in dwells due to fibrin  - gent ointment to catheter  - EDW 94 kg  - check daily weight - reviewed with Nursing team  - may need to increase PD dwell % tomorrow if gains weight or edema worsening    2) seizure like activity    - per Neurology team  - on valproic acid    3) anemia    - no HETAL due to seizure (was on HETAL as outpatient  - iron level - ferritin 766  Iron sat 84%  Not iron deficient  4) MBD     - cont phos binder, sensipar, vit D  - phos 5 6 on 3/2  Monitor with binders    5) HTN    - on clonidine, hydralazine, labetalol, torsemide, doxazosin, lisinopril, nifedipine  - high risk of polypharmacy  - if remains stable, may consider lowering or holding doxazosin and increasing another another in 24-48 hours    6) electrolytes - stable potassium on 3/2  Hyponatremia-  Monitor for now with PD    7) acid/base - bicarb stable    SUBJECTIVE / INTERVAL HISTORY:    -160s  Weight bed scale yest 102 kg  Not recorded today as of yet   cc   Pt seen at bedside with chloride 0 9 % 50 mL IVPB, 50 mg, Intravenous, Daily PRN, Katherin Rico MD    doxazosin (CARDURA) tablet 2 mg, 2 mg, Oral, HS, Mateo Vincent MD, 2 mg at 03/01/20 2106    gabapentin (NEURONTIN) capsule 100 mg, 100 mg, Oral, HS, Katherin Rico MD, 100 mg at 03/01/20 2106    gentamicin (GARAMYCIN) 0 1 % ointment 1 application, 1 application, Topical, Daily, Katherin Rico MD, 1 application at 12/68/46 2328    heparin (porcine) subcutaneous injection 5,000 Units, 5,000 Units, Subcutaneous, Q8H [address], Katherin Rico MD, 5,000 Units at 03/02/20 9054    hydrALAZINE (APRESOLINE) injection 20 mg, 20 mg, Intravenous, Q4H PRN, Katherin Rico MD    hydrALAZINE (APRESOLINE) tablet 50 mg, 50 mg, Oral, TID, Mateo Vincent MD, 50 mg at 03/02/20 0810    insulin glargine (LANTUS) subcutaneous injection 16 Units 0 16 mL, 16 Units, Subcutaneous, HS, Medhat Kulkarni MD, 16 Units at 03/01/20 2106    insulin lispro (HumaLOG) 100 units/mL subcutaneous injection 1-5 Units, 1-5 Units, Subcutaneous, TID AC, 2 Units at 03/02/20 1143 **AND** Fingerstick Glucose (POCT), , , TID AC, Mateo Vincent MD    insulin lispro (HumaLOG) 100 units/mL subcutaneous injection 1-5 Units, 1-5 Units, Subcutaneous, HS, Mateo Vincent MD, 3 Units at 03/01/20 2107    insulin lispro (HumaLOG) 100 units/mL subcutaneous injection 4 Units, 4 Units, Subcutaneous, TID With Meals, Katherin Rico MD, 4 Units at 03/02/20 1144    labetalol (NORMODYNE) tablet 300 mg, 300 mg, Oral, Q12H [address], Mateo Vincent MD, 300 mg at 03/02/20 0810    Labetalol HCl (NORMODYNE) injection 20 mg, 20 mg, Intravenous, Q2H PRN, Katherin Rico MD    lactobacillus acidophilus-bulgaricus [facility]) packet 1 packet, 1 packet, Oral, HS, Katherin Rico MD, 1 packet at 03/01/20 2107    lisinopril (ZESTRIL) tablet 40 mg, 40 mg, Oral, Daily, Mateo Vincent MD, 40 mg at 03/02/20 0809    NIFEdipine (PROCARDIA XL) 24 hr tablet 60 mg, 60 mg, Oral, BID, Mateo MABRY MD [place], 60 mg at 03/02/20 0810    ondansetron (ZOFRAN) injection 4 mg, 4 mg, Intravenous, Q4H PRN, David Linares MD, 4 mg at 03/01/20 0905    ondansetron (ZOFRAN-ODT) dispersible tablet 4 mg, 4 mg, Oral, Q8H [facility] assisted, Mateo Vincent MD, 4 mg at 03/02/20 9296    pantoprazole (PROTONIX) EC tablet 40 mg, 40 mg, Oral, Early Morning, Mateo Vincent MD, 40 mg at 03/02/20 5579    torsemide (DEMADEX) tablet 100 mg, 100 mg, Oral, Daily With Lunch, David Linares MD, 100 mg at 03/02/20 1143    valproic acid (DEPAKENE) capsule 750 mg, 750 mg, Oral, Q12H [facility], David Linares MD, 750 mg at 03/02/20 0815    Laboratory Results:  Results from last 7 days   Lab Units 03/02/20  0636 02/26/20  0500 02/25/20  0759   WBC Thousand/uL 6 36  --  8 70   HEMOGLOBIN g/dL 9 1*  --  8 6*   HEMATOCRIT % 27 2*  --  25 4*   PLATELETS Thousands/uL 327  --  249   POTASSIUM mmol/L 4 0 3 8 4 1   CHLORIDE mmol/L 94* 98* 99*   CO2 mmol/L 25 22 22   BUN mg/dL 61* 50* 47*   CREATININE mg/dL 13 70* 14 50* 14 50*   CALCIUM mg/dL 8 7 8 9 8 8   PHOSPHORUS mg/dL 5 6*  --   --

## 2020-03-03 VITALS
SYSTOLIC BLOOD PRESSURE: 148 MMHG | TEMPERATURE: 97.9 F | BODY MASS INDEX: 31.23 KG/M2 | HEIGHT: 71 IN | RESPIRATION RATE: 18 BRPM | DIASTOLIC BLOOD PRESSURE: 74 MMHG | WEIGHT: 223.11 LBS | OXYGEN SATURATION: 99 % | HEART RATE: 78 BPM

## 2020-03-03 LAB
ANION GAP SERPL CALCULATED.3IONS-SCNC: 11 MMOL/L (ref 4–13)
BUN SERPL-MCNC: 59 MG/DL (ref 5–25)
CALCIUM SERPL-MCNC: 9 MG/DL (ref 8.3–10.1)
CHLORIDE SERPL-SCNC: 94 MMOL/L (ref 100–108)
CO2 SERPL-SCNC: 28 MMOL/L (ref 21–32)
CREAT SERPL-MCNC: 13.7 MG/DL (ref 0.6–1.3)
GFR SERPL CREATININE-BSD FRML MDRD: 4 ML/MIN/1.73SQ M
GLUCOSE SERPL-MCNC: 285 MG/DL (ref 65–140)
GLUCOSE SERPL-MCNC: 290 MG/DL (ref 65–140)
GLUCOSE SERPL-MCNC: 373 MG/DL (ref 65–140)
GLUCOSE SERPL-MCNC: 85 MG/DL (ref 65–140)
POTASSIUM SERPL-SCNC: 4.4 MMOL/L (ref 3.5–5.3)
SODIUM SERPL-SCNC: 133 MMOL/L (ref 136–145)

## 2020-03-03 PROCEDURE — NC001 PR NO CHARGE: Performed by: PHYSICAL MEDICINE & REHABILITATION

## 2020-03-03 PROCEDURE — 99232 SBSQ HOSP IP/OBS MODERATE 35: CPT | Performed by: PHYSICAL MEDICINE & REHABILITATION

## 2020-03-03 PROCEDURE — 99232 SBSQ HOSP IP/OBS MODERATE 35: CPT | Performed by: INTERNAL MEDICINE

## 2020-03-03 PROCEDURE — 82948 REAGENT STRIP/BLOOD GLUCOSE: CPT

## 2020-03-03 PROCEDURE — 97530 THERAPEUTIC ACTIVITIES: CPT

## 2020-03-03 PROCEDURE — 97530 THERAPEUTIC ACTIVITIES: CPT | Performed by: PHYSICAL THERAPIST

## 2020-03-03 PROCEDURE — 97112 NEUROMUSCULAR REEDUCATION: CPT | Performed by: PHYSICAL THERAPIST

## 2020-03-03 PROCEDURE — 97535 SELF CARE MNGMENT TRAINING: CPT

## 2020-03-03 PROCEDURE — 80048 BASIC METABOLIC PNL TOTAL CA: CPT | Performed by: INTERNAL MEDICINE

## 2020-03-03 RX ORDER — CLONIDINE HYDROCHLORIDE 0.1 MG/1
0.1 TABLET ORAL EVERY 8 HOURS PRN
Status: DISCONTINUED | OUTPATIENT
Start: 2020-03-03 | End: 2020-03-03 | Stop reason: HOSPADM

## 2020-03-03 RX ORDER — ONDANSETRON 4 MG/1
4 TABLET, ORALLY DISINTEGRATING ORAL EVERY 4 HOURS PRN
Status: DISCONTINUED | OUTPATIENT
Start: 2020-03-03 | End: 2020-03-03 | Stop reason: HOSPADM

## 2020-03-03 RX ORDER — CLONIDINE HYDROCHLORIDE 0.2 MG/1
0.2 TABLET ORAL EVERY 8 HOURS SCHEDULED
Status: DISCONTINUED | OUTPATIENT
Start: 2020-03-03 | End: 2020-03-03

## 2020-03-03 RX ORDER — NIFEDIPINE 60 MG/1
60 TABLET, FILM COATED, EXTENDED RELEASE ORAL 2 TIMES DAILY
Qty: 180 TABLET | Refills: 0
Start: 2020-03-03

## 2020-03-03 RX ORDER — DOXAZOSIN 2 MG/1
2 TABLET ORAL
Status: DISCONTINUED | OUTPATIENT
Start: 2020-03-03 | End: 2020-03-03 | Stop reason: HOSPADM

## 2020-03-03 RX ORDER — DOXAZOSIN 2 MG/1
2 TABLET ORAL
Qty: 30 TABLET | Refills: 0 | Status: SHIPPED | OUTPATIENT
Start: 2020-03-03 | End: 2021-03-31 | Stop reason: ALTCHOICE

## 2020-03-03 RX ORDER — INSULIN GLARGINE 100 [IU]/ML
20 INJECTION, SOLUTION SUBCUTANEOUS
Status: DISCONTINUED | OUTPATIENT
Start: 2020-03-03 | End: 2020-03-03 | Stop reason: HOSPADM

## 2020-03-03 RX ORDER — CLONIDINE HYDROCHLORIDE 0.1 MG/1
0.1 TABLET ORAL EVERY 8 HOURS SCHEDULED
Status: DISCONTINUED | OUTPATIENT
Start: 2020-03-03 | End: 2020-03-03 | Stop reason: HOSPADM

## 2020-03-03 RX ORDER — LABETALOL 300 MG/1
300 TABLET, FILM COATED ORAL EVERY 12 HOURS SCHEDULED
Qty: 60 TABLET | Refills: 0 | Status: SHIPPED | OUTPATIENT
Start: 2020-03-03 | End: 2021-04-15

## 2020-03-03 RX ORDER — VALPROIC ACID 250 MG/1
750 CAPSULE, LIQUID FILLED ORAL EVERY 12 HOURS SCHEDULED
Qty: 60 CAPSULE | Refills: 0 | Status: SHIPPED | OUTPATIENT
Start: 2020-03-03 | End: 2020-03-09

## 2020-03-03 RX ADMIN — INSULIN LISPRO 2 UNITS: 100 INJECTION, SOLUTION INTRAVENOUS; SUBCUTANEOUS at 08:03

## 2020-03-03 RX ADMIN — ONDANSETRON 4 MG: 4 TABLET, ORALLY DISINTEGRATING ORAL at 10:19

## 2020-03-03 RX ADMIN — NIFEDIPINE 60 MG: 60 TABLET, FILM COATED, EXTENDED RELEASE ORAL at 08:10

## 2020-03-03 RX ADMIN — CALCIUM ACETATE 667 MG: 667 CAPSULE ORAL at 11:56

## 2020-03-03 RX ADMIN — PANTOPRAZOLE SODIUM 40 MG: 40 TABLET, DELAYED RELEASE ORAL at 06:32

## 2020-03-03 RX ADMIN — ONDANSETRON 4 MG: 2 INJECTION INTRAMUSCULAR; INTRAVENOUS at 01:36

## 2020-03-03 RX ADMIN — ONDANSETRON 4 MG: 4 TABLET, ORALLY DISINTEGRATING ORAL at 01:51

## 2020-03-03 RX ADMIN — TORSEMIDE 100 MG: 20 TABLET ORAL at 12:00

## 2020-03-03 RX ADMIN — HEPARIN SODIUM 5000 UNITS: 5000 INJECTION INTRAVENOUS; SUBCUTANEOUS at 14:20

## 2020-03-03 RX ADMIN — CINACALCET HYDROCHLORIDE 90 MG: 30 TABLET, FILM COATED ORAL at 10:03

## 2020-03-03 RX ADMIN — ASPIRIN 81 MG 81 MG: 81 TABLET ORAL at 10:02

## 2020-03-03 RX ADMIN — CLONIDINE HYDROCHLORIDE 0.1 MG: 0.1 TABLET ORAL at 06:33

## 2020-03-03 RX ADMIN — ONDANSETRON 4 MG: 4 TABLET, ORALLY DISINTEGRATING ORAL at 14:20

## 2020-03-03 RX ADMIN — MELATONIN 2000 UNITS: at 10:07

## 2020-03-03 RX ADMIN — CHLORHEXIDINE GLUCONATE 0.12% ORAL RINSE 15 ML: 1.2 LIQUID ORAL at 08:11

## 2020-03-03 RX ADMIN — HYDRALAZINE HYDROCHLORIDE 50 MG: 50 TABLET, FILM COATED ORAL at 16:14

## 2020-03-03 RX ADMIN — HEPARIN SODIUM 5000 UNITS: 5000 INJECTION INTRAVENOUS; SUBCUTANEOUS at 06:33

## 2020-03-03 RX ADMIN — HYDRALAZINE HYDROCHLORIDE 50 MG: 50 TABLET, FILM COATED ORAL at 10:07

## 2020-03-03 RX ADMIN — CLONIDINE HYDROCHLORIDE 0.1 MG: 0.1 TABLET ORAL at 14:20

## 2020-03-03 RX ADMIN — VALPROIC ACID 750 MG: 250 CAPSULE, LIQUID FILLED ORAL at 08:21

## 2020-03-03 RX ADMIN — LABETALOL HCL 300 MG: 200 TABLET, FILM COATED ORAL at 08:10

## 2020-03-03 RX ADMIN — LISINOPRIL 40 MG: 20 TABLET ORAL at 10:06

## 2020-03-03 RX ADMIN — ONDANSETRON 4 MG: 4 TABLET, ORALLY DISINTEGRATING ORAL at 06:30

## 2020-03-03 RX ADMIN — INSULIN LISPRO 3 UNITS: 100 INJECTION, SOLUTION INTRAVENOUS; SUBCUTANEOUS at 02:15

## 2020-03-03 NOTE — PROGRESS NOTES
03/03/20 1430   Pain Assessment   Pain Assessment No/denies pain   Pain Score No Pain   Restrictions/Precautions   Precautions Bed/chair alarms; Fall Risk   Cognition   Arousal/Participation Alert; Cooperative   Subjective   Subjective Pt reports he is going home today and starting outpatient PT tomorrow    Roll Left and Right   Type of Assistance Needed Independent   Amount of Physical Assistance Provided No physical assistance   Roll Left and Right CARE Score 6   Sit to Lying   Type of Assistance Needed Independent   Amount of Physical Assistance Provided No physical assistance   Sit to Lying CARE Score 6   Lying to Sitting on Side of Bed   Type of Assistance Needed Independent   Amount of Physical Assistance Provided No physical assistance   Lying to Sitting on Side of Bed CARE Score 6   Sit to Stand   Type of Assistance Needed Supervision   Amount of Physical Assistance Provided No physical assistance   Sit to Stand CARE Score 4   Bed-Chair Transfer   Type of Assistance Needed Incidental touching   Amount of Physical Assistance Provided No physical assistance   Chair/Bed-to-Chair Transfer CARE Score 4   Transfer Bed/Chair/Wheelchair   Limitations Noted In Balance; Coordination   Adaptive Equipment Rollator;Cane   All Transfer Contact Guard   Car Transfer   Type of Assistance Needed Incidental touching   Amount of Physical Assistance Provided No physical assistance   Car Transfer CARE Score 4   Walk 10 Feet   Type of Assistance Needed Incidental touching   Amount of Physical Assistance Provided No physical assistance   Walk 10 Feet CARE Score 4   Walk 50 Feet with Two Turns   Type of Assistance Needed Incidental touching   Amount of Physical Assistance Provided No physical assistance   Walk 50 Feet with Two Turns CARE Score 4   Walk 150 Feet   Type of Assistance Needed Incidental touching   Amount of Physical Assistance Provided No physical assistance   Walk 150 Feet CARE Score 4   Walking 10 Feet on Uneven Surfaces   Reason if not Attempted Safety concerns   Walking 10 Feet on Uneven Surfaces CARE Score 88   Ambulation   Does the patient walk? 2  Yes   Primary Mode of Locomotion Prior to Admission Walk   Distance Walked (feet) 150 ft   Assist Device Roller Walker;Cane   Gait Pattern Inconsistant Marcelle; Ataxic   Limitations Noted In Balance; Endurance;Speed   Provided Assistance with: Balance   Walk Assist Level Contact Guard;Close Supervision   Findings CG w/ SPC for short distances, S w/ RW   Wheel 50 Feet with Two Turns   Reason if not Attempted Activity not applicable   Wheel 50 Feet with Two Turns CARE Score 9   Wheel 150 Feet   Reason if not Attempted Activity not applicable   Wheel 737 Feet CARE Score 9   Wheelchair mobility   Does the patient use a wheelchair? 0  No   Curb or Single Stair   Style negotiated Curb   Type of Assistance Needed Physical assistance   Amount of Physical Assistance Provided 25%-49%   Comment min A w/ SPC   1 Step (Curb) CARE Score 3   4 Steps   Type of Assistance Needed Incidental touching   Amount of Physical Assistance Provided No physical assistance   4 Steps CARE Score 4   12 Steps   Type of Assistance Needed Incidental touching   Amount of Physical Assistance Provided No physical assistance   12 Steps CARE Score 4   Stairs   Type Stairs   # of Steps 20   Assist Devices Single Rail;Cane   Findings ascend reciprocal, descend nonreciprocal   Therapeutic Interventions   Neuromuscular Re-Education FTEO firm 30 sec x 3, semi tandem 30 sec x 4, standing marching w/ 3 sec hold BUE support   Assessment   Treatment Assessment Reviewed all functional mobility w/ pt and his mother, recommendations made below  Pt's mother purchased bariatric heavy duty SPC from pharmacy downstairs, adjusted height for pt  Pt ambulated w/ it and states he felt comfortable w/ it  Practiced floor transfer and pt did require UE support from mat table   Recommend rollator in the community, [facility] for household distances  Pt will benefit from continued outpatient PT to reduce fall risk and maximize safety w/ all mobility  Family/Caregiver Present pt's mother    PT Family training done with: reviewed guarding on stairs and recommendations for nonreciprocal pattern when descending  reviewed recommendations for [facility] in the home and [facility] in the community   Problem List Decreased strength; Impaired balance;Decreased coordination   Barriers to Discharge None   Barriers to Discharge Comments pt D/C later today    Plan   Treatment/Interventions Functional transfer training;LE strengthening/ROM; Elevations; Therapeutic exercise; Endurance training;Patient/family training;Equipment eval/education; Bed mobility;Gait training   Progress Progressing toward goals   Recommendation   Recommendation Home with family support; Outpatient PT   Equipment Recommended Walker;Cane   PT Therapy Minutes   PT Time In 1430   PT Time Out 1530   PT Total Time (minutes) 60   PT Mode of treatment - Individual (minutes) 60   PT Mode of treatment - Concurrent (minutes) 0   PT Mode of treatment - Group (minutes) 0   PT Mode of treatment - Co-treat (minutes) 0   PT Mode of Treatment - Total time(minutes) 60 minutes   PT Cumulative Minutes 240   Therapy Time missed   Time missed?  No

## 2020-03-03 NOTE — SOCIAL WORK
ANKIT and Dr Sarah Saucedo met with Pt and his mother to review the recommendations for d/c on Thursday, 3/5, as well as the outpt PT/OT at [address] [place] [address] Neuro  Pt would like to leave today, after he completes shower chair training with OT  Pt and his mother would like CM to schedule the initial appointments for the outpt services  ANKIT informed the team of the above  PCT [address] [place] [address], [facility] to schedule Pts initial outpt PT/OT  PT: 3 4, at 3pm  OT[place] 3 5, [place] [address] shared with Pt and his mother, and placed on d/c instructions

## 2020-03-03 NOTE — PCC NURSING
36 yo male with PMHx of ESRD on PD, Type I DM, HTN, HPL, CVA, homozygous for C677T MTHFR mutation and heterozygote for prothombin gene mutation, who initially presented on 2/20 with dizziness, lightheaded to [facility], and had seizure initially also and was initially intubated and sedated  Transferred to [facility] for further evaluation and care  ESRD - not anuric  Access - PD catheter, now pt is on all 1 5% dwells currently with hep in dwells due to fibrin, gent ointment to catheter P8RN manages, EDW 94 kg  Pt is on seizure precautions; on valproic acid  [place] disorder- on phos binder, sensipar, vit D  Phos 5/6 on 3/2  HTN is being treated with clonidine, hydralazine, labetalol, torsemide, doxazosin, lisinopril, nifedipine placing him at high risk of polypharmacy  If  He remains stable, may consider lowering or holding doxazosin and increasing another in 24-48 hours  Stable potassium on 3/2  Hyponatremia- 132 3/2  Monitor for now with PD  On 3/3 at 0200 pt had his mom contact the RN to check his bloodsugar which was 373  Nephrology ordered a one time order of 3units of Humalog  Pt is ordered to have Zofran q8hrs scheduled for nausea with PD, and 4mg Zofran PRN q4  We will continue to monitor daily weights at around 1500 using the standing scale  We will monitor  I/O, QID bloodsugars (pt uses Dexcam meter), and vital signs especially HTN

## 2020-03-03 NOTE — PROGRESS NOTES
Patient requesting to have more insulin after checking his Dexcom  became upset when he was told he could not have anymore coverage beyond what was administered at    Patient educated that he will need to speak with his attending regarding this when they round with him in the AM

## 2020-03-03 NOTE — PROGRESS NOTES
Physical Medicine and Rehabilitation Progress Note  Natalia Mckeon 37 y o  male MRN: [id number]  Unit/Bed#: -02 Encounter: [id number]    HPI: Karmen Salas is a 37 y o  male who presented to the [facility] with status epilepticus on admission  Given ativan, versed and depacon on admission with no break in seizures  Resulted in patient being intubated and started on propofol drip  It was believed seizures were secondary to uncontrolled HTN  Patient was started on video EEG  Seizures did resolve, and patient able to be extubated  Accepted to [facility] on 2/28/20       Chief Complaint: seizures    Interval: Patient again frustrated with inability to self-regulate his insulin  After discussion with family, patient would prefer to be discharged after therapies this evening  Patient is fairly close to baseline, will be a safe discharge home under the supervision of his family; mother reports her and her SO will be there as they are both retired  Discussed with endocrinology, they want patient back on his home regimen for insulin management  In addition, discussed with therapy teams to perform car transfers and shower transfers prior to d/c today  ROS: A 10 point ROS was performed; negative except as noted above  Assessment/Plan:    * Impaired mobility and ADLs  Assessment & Plan  · Acute comprehensive interdisciplinary inpatient rehabilitation including PT, OT, RN, CM, SW, dietary, psychology, etc   · Goal: modified independent    Left arm pain  Assessment & Plan  · Imaging reveals no acute fracture, but patient noted to have joint effusion, as well as "Significant joint space narrowing or bony erosion   Olecranon enthesopathy noted"  · Will require orthopedic f/u    Peripheral polyneuropathy  Assessment & Plan  · Continue gabapentin    Diabetic gastroparesis ([facility] [address] )  Assessment & Plan  · Per patient and family, improved with smaller, frequent meals    Dyslipidemia  Assessment & Plan  · Continue statin    Peritoneal dialysis catheter in place [facility])  Assessment & Plan  Results from last 7 days   Lab Units 03/02/20  0636 02/26/20  0500 02/25/20  0759   CREATININE mg/dL 13 70* 14 50* 14 50*     · Continue monitoring kidney function via intermittent BMP  · Avoid nephrotoxic meds/NSAIDs  · Patient performs PD at home  · Nephrology team following, appreciate recs      Anemia  Assessment & Plan  Results from last 7 days   Lab Units 03/02/20  0636 02/25/20  0759   HEMOGLOBIN g/dL 9 1* 8 6*     · Monitor CBC intermittently  · Transfuse for Hgb <7      Renovascular hypertension  Assessment & Plan  Temp:  [98 4 °F (36 9 °C)-98 7 °F (37 1 °C)] 98 4 °F (36 9 °C)  HR:  [75-85] 85  Resp:  [16-20] 20  BP: (143-169)/(65-90) 162/90    · IM/nephrology service managing anti-hypertensives, adjusting as needed    Type 1 diabetes mellitus with hypoglycemia [facility])  Assessment & Plan    Recent Labs     03/01/20  1734 03/01/20  2039 03/02/20  0640 03/02/20  1055   POCGLU 279* 343* 190* 296*     · Last A1C: 5 6  · Continue diabetic diet  · Continue SSI  · On Lantus, Humalog   · Endocrinology monitoring blood sugars, adjusting regimen as needed  dvtppx: heparin, scds   Scheduled Meds:    Current Facility-Administered Medications:  acetaminophen 650 mg Oral Q8H PRN Mateo Vincent MD   aspirin 81 mg Oral Daily Mateo Vincent MD   atorvastatin 40 mg Oral HS Mateo Vincent MD   calcium acetate 667 mg Oral TID With Meals Mateo Vincent MD   chlorhexidine 15 mL Swish & Spit Q12H [address] Val Monzon MD   cholecalciferol 2,000 Units Oral Daily Val Monzon MD   cinacalcet 90 mg Oral Daily Mateo Vincent MD   cloNIDine 0 1 mg Oral Q8H PRN Herbie Espinal DO   cloNIDine 0 1 mg Oral Q8H [address] Brian Squires MD   dextrose 25 g Intravenous PRN Mateo Vincent MD   diphenhydrAMINE (BENADRYL) IVPB 50 mg Intravenous Daily PRN Mateo Vincent MD   doxazosin 2 mg Oral HS Brian Squires MD   gabapentin 100 mg Oral HS Mateo Vincent MD   gentamicin 1 application Topical Daily Mateo Vincent MD   glucose 16 g Oral PRN Deonna Alatorre PA-C   heparin (porcine) 5,000 Units Subcutaneous Q8H [facility] Mateo Vincent MD   hydrALAZINE 50 mg Oral TID Mateo Vincent MD   insulin glargine 20 Units Subcutaneous HS Nicko Parnell DO   insulin lispro 1-5 Units Subcutaneous TID AC Vilma Pedro MD   insulin lispro 1-5 Units Subcutaneous HS Vilma Pedro MD   insulin lispro 6 Units Subcutaneous TID With Meals Vilma Pedro MD   labetalol 300 mg Oral Q12H [facility] Mateo Vincent MD   lactobacillus acidophilus-bulgaricus 1 packet Oral HS Mateo Vincent MD   lisinopril 40 mg Oral Daily Mateo Vincent MD   NIFEdipine 60 mg Oral BID Anayeli Guillen MD   ondansetron 4 mg Oral Q8H [facility] Mateo Vincent MD   ondansetron 4 mg Oral Q4H PRN Mariella Vallecillo MD   pantoprazole 40 mg Oral Early Morning Anayeli Guillen MD   torsemide 100 mg Oral Daily With Kyaw Dunbar MD   valproic acid 750 mg Oral Q12H [facility] Mateo Vincent MD        Objective:    Functional Update:  Physical Therapy Occupational Therapy   Weight Bearing Status: Full Weight Bearing  Transfers: Minimal Assistance  Bed Mobility: Incidental Touching, Minimal Assistance  Amulation Distance (ft): 150 feet  Ambulation: Minimal Assistance  Assistive Device for Ambulation: Single [employer]  Discharge Recommendations: Home with:  [address] [facility] Nathan Coates with[de-identified] 24 Hour Assisteance, 24 Hour Supervision, Family Support, Outpatient Physical Therapy, Home Physical Therapy   Eating: Independent  Grooming: Minimal Assistance  Bathing: Minimal Assistance  Bathing: Minimal Assistance  Upper Body Dressing: Minimal Assistance  Lower Body Dressing: Minimal Assistance  Toileting: Minimal Assistance  Tub/Shower Transfer: Minimal Assistance  Toilet Transfer: Minimal Assistance  Cognition: Within Defined Limits  Orientation: Person, Place, Time, Situation       Allergies per EMR    Physical Exam:  Temp:  [97 9 °F (36 6 °C)-98 4 °F (36 9 °C)] 97 9 °F (36 6 °C)  HR:  [76-82] 78  Resp:  [18] 18  BP: (136-173)/(70-88) 136/86  SpO2:  [95 %-99 %] 99 %    General: alert, no apparent distress, cooperative and comfortable  HEENT:  Head: Normocephalic, no lesions, without obvious abnormality  Eye: Normal external eye, conjunctiva, lidsc cornea  Ears: Normal external ears  Nose: Normal external nose, mucus membranes  LUNGS:  no abnormal respiratory pattern, no retractions noted, non-labored breathing   ABDOMEN:  soft, non-tender, non-distended  EXTREMITIES:  edema: 1+ to bilateral LEs (improved as compared to previous)  NEURO:  clear speech, following all commands, oriented x4  PSYCH:  Affect: euthymic    Physical examination is otherwise unchanged from previous encounter, except as noted above  Diagnostic Studies: Reviewed, no new imaging  No orders to display     Laboratory: Reviewed   Results from last 7 days   Lab Units 03/02/20  0636   HEMOGLOBIN g/dL 9 1*   HEMATOCRIT % 27 2*   WBC Thousand/uL 6 36     Results from last 7 days   Lab Units 03/03/20  0644 03/02/20  0636 02/26/20  0500   BUN mg/dL 59* 61* 50*   SODIUM mmol/L 133* 132* 134*   POTASSIUM mmol/L 4 4 4 0 3 8   CHLORIDE mmol/L 94* 94* 98*   CREATININE mg/dL 13 70* 13 70* 14 50*            ** Please Note: Flu[employer] voice to text software may have been used in the creation of this document   **

## 2020-03-03 NOTE — DISCHARGE INSTRUCTIONS
[address] Discharge Instructions    Should you develop fevers, chills, sweats, rigors, or any uncommon drainage from your PD site please contact your family doctor or nephrologist immediately or go to the ER immediately as these are indicators of possible infection     Please note you are restricted from driving/operating a motorized vehicle/operating heavy machinery/etc until you are cleared by your doctors or through a formal driving evaluation  This service is offered through [employer] driving evaluation program on [address] however this evaluation can be done at a site of your choosing  Please contact your family doctor for a referral when your family doctor clears you to perform this evaluation once your neurologic/physical deficits have stabilized  Please see your doctors listed in the follow up providers section of your discharge paperwork, and take the discharge paperwork with you to your appointments    Please note changes may have been made to your medications please refer to your discharge paperwork for your current medications and take this list with you to all your doctors appointments for your doctors to review    Please do not resume a home medication unless the medication reconciliation sheet indicates to do so, please do not assume that a medication that you were given a prescription for is the same as a medication you have at home based on both medications having the same name as dosages and frequency may have changed  Prior to your discharge from the hospital, your nurse has reviewed: your medications, when to take them, how to take them, what they are for, how much to take, and when your next dose is due; please follow these instructions as directed       Please check your blood pressure & heart rate/pulse prior to taking your blood pressure/heart rate medications and 1 hour after, please contact your family doctor or cardiologist immediately for a blood pressure below 100/60 and do not take the medications until speaking with them, please contact your family doctor or cardiologist immediately for blood pressure greater than 160/100; please contact your family doctor or cardiologist immediately for a heart rate/pulse lower than 60 and do not take the medications until speaking with them, please contact your family doctor or cardiologist immediately for heart rate/pulse greater than 100     Unless specifically noted in your medication list provided to you in your discharge paper work, please discuss with your family doctor prior to resuming any vitamins/minerals/supplements you may have been taking prior to your hospitalization     Please note a summary of your hospital stay with relevant information for your doctors has been sent to them, please confirm with your doctors at your follow up visits that they have received this summary and have them contact [address] [place] [address] if they have not received them along with any other medical records they may require

## 2020-03-03 NOTE — PROGRESS NOTES
03/03/20 1230   Pain Assessment   Pain Assessment No/denies pain   Pain Score No Pain   Restrictions/Precautions   Precautions Bed/chair alarms; Fall Risk;Supervision on toilet/commode   Lifestyle   Autonomy "I am ready to go home"   Oral Hygiene   Type of Assistance Needed Incidental touching   Amount of Physical Assistance Provided No physical assistance   Comment CGA at sink   Oral Hygiene CARE Score 4   Shower/Bathe Self   Type of Assistance Needed Supervision   Amount of Physical Assistance Provided No physical assistance   Shower/Bathe Self CARE Score 4   Tub/Shower Transfer   Assessed Tub  (Supervision)   Upper Body Dressing   Type of Assistance Needed Set-up / clean-up   Amount of Physical Assistance Provided No physical assistance   Upper Body Dressing CARE Score 5   Lower Body Dressing   Type of Assistance Needed Supervision   Amount of Physical Assistance Provided No physical assistance   Lower Body Dressing CARE Score 4   Putting On/Taking Off Footwear   Type of Assistance Needed Set-up / clean-up   Amount of Physical Assistance Provided No physical assistance   Putting On/Taking Off Footwear CARE Score 5   Sit to Stand   Type of Assistance Needed Supervision   Amount of Physical Assistance Provided No physical assistance   Sit to Stand CARE Score 4   Toileting Hygiene   Type of Assistance Needed Supervision   Amount of Physical Assistance Provided No physical assistance   Toileting Hygiene CARE Score 4   Toilet Transfer   Type of Assistance Needed Supervision   Amount of Physical Assistance Provided No physical assistance   Toilet Transfer CARE Score 4   Cognition   Overall Cognitive Status WFL   Arousal/Participation Alert; Cooperative   Attention Attends with cues to redirect   Orientation Level Oriented X4   Memory Decreased recall of precautions   Following Commands Follows multistep commands with increased time or repetition   Activity Tolerance   Activity Tolerance Patient tolerated treatment well   Assessment   Treatment Assessment Pt participated in skilled OT session focusing on ADL retraining and functional transfer training  Pt completed ADL at CGA-Supervision level  Pt has tub transfer bench at home PTA  Pts mother educated on safety with SPC when ambulating  Overall pt tolerated session well  No equipment required for D/C  Prognosis Fair   Problem List Decreased strength;Decreased range of motion;Decreased endurance; Impaired balance;Decreased mobility; Decreased coordination; Impaired judgement;Decreased cognition;Decreased safety awareness; Impaired vision;Obesity   Plan   Progress Progressing toward goals   Recommendation   OT Discharge Recommendation Home with family support   OT Therapy Minutes   OT Time In 1230   OT Time Out 1400   OT Total Time (minutes) 90   OT Mode of treatment - Individual (minutes) 90   OT Mode of treatment - Concurrent (minutes) 0   OT Mode of treatment - Group (minutes) 0   OT Mode of treatment - Co-treat (minutes) 0   OT Mode of Treatment - Total time(minutes) 90 minutes   OT Cumulative Minutes 330

## 2020-03-03 NOTE — PROGRESS NOTES
Internal Medicine Progress Note  Patient: Mara Olivo  Age/sex: 37 y o  male  Medical Record #: [id number]      ASSESSMENT/PLAN: (Interval History)  Mara Olivo is seen and examined and management for following issues:    New seizures  · Had status epilepticus on admission  · Per Neurology strong suspicion for provoked seizure in the setting of uncontrolled HTN  · Continue VPA  · Outpt follow-up with Epilepsy team      HTN  · Stable  · Continue current medications  · Nephrology was managing inpatient  DM type 1  · HA1C 5 6  · Continue insulin as ordered per Endocrinology  · Continue SSI and accuchecks  Adjust medications as needed  · Family bringing in food such as peanut M&Ms and Girl  cookies to treat hypoglycemia  ESRD on peritoneal dialysis  · Nephrology managing  · Adjustments made to fluids used during treatments     Diabetic gastroparesis  · Suspected etiology of nausea and emesis  · Continue Protonix and ATC Zofran  · Outpt GI follow-up  History of cerebellar stroke  · Continue ASA 81mg daily  · Continue risk factor modification     Anemia of chronic disease  · Monitor cbc  · Transfusion per renal      The above assessment and plan was reviewed and updated as determined by my evaluation of the patient on 3/3/2020      Labs:   Results from last 7 days   Lab Units 03/02/20  0636   WBC Thousand/uL 6 36   HEMOGLOBIN g/dL 9 1*   HEMATOCRIT % 27 2*   PLATELETS Thousands/uL 327     Results from last 7 days   Lab Units 03/03/20  0644 03/02/20  0636   SODIUM mmol/L 133* 132*   POTASSIUM mmol/L 4 4 4 0   CHLORIDE mmol/L 94* 94*   CO2 mmol/L 28 25   BUN mg/dL 59* 61*   CREATININE mg/dL 13 70* 13 70*   CALCIUM mg/dL 9 0 8 7             Results from last 7 days   Lab Units 03/03/20  0639 03/03/20  0155 03/02/20  2110   POC GLUCOSE mg/dl 290* 373* 214*       Review of Scheduled Meds:    Current Facility-Administered Medications:  acetaminophen 650 mg Oral Q8H ROBERTO CARLOSN Mateo Vincent, MD   aspirin 81 mg Oral Daily Mateo Vincent MD   atorvastatin 40 mg Oral HS Mateo Vincent MD   calcium acetate 667 mg Oral TID With Meals Pablo Camacho MD   chlorhexidine 15 mL Swish & Spit Q12H [facility] Pablo Camacho MD   cholecalciferol 2,000 Units Oral Daily Pablo Camacho MD   cinacalcet 90 mg Oral Daily Mateo Vincent MD   cloNIDine 0 1 mg Oral Q8H [facility] Mateo Vincent MD   cloNIDine 0 1 mg Oral Q8H PRN Unique Hove, DO   dextrose 25 g Intravenous PRN Mateo Vincent MD   diphenhydrAMINE (BENADRYL) IVPB 50 mg Intravenous Daily PRN Mateo Vincent MD   doxazosin 2 mg Oral HS Mateo Vincent MD   gabapentin 100 mg Oral HS Mateo Vincent MD   gentamicin 1 application Topical Daily Mateo Vincent MD   heparin (porcine) 5,000 Units Subcutaneous Q8H [facility] Mateo Vincent MD   hydrALAZINE 50 mg Oral TID Mateo Vincent MD   insulin glargine 20 Units Subcutaneous HS Unique Hove, DO   insulin lispro 1-5 Units Subcutaneous TID AC Mateo Vincent MD   insulin lispro 1-5 Units Subcutaneous HS Mateo Vincent MD   insulin lispro 8 Units Subcutaneous TID With Meals Unique Jhaverive, DO   labetalol 300 mg Oral Q12H [facility] Mateo Vincent MD   lactobacillus acidophilus-bulgaricus 1 packet Oral HS Mateo Vincent MD   lisinopril 40 mg Oral Daily Mateo Vincent MD   NIFEdipine 60 mg Oral BID Mateo Vincent MD   ondansetron 4 mg Oral Q8H [facility] Mateo Vincent MD   ondansetron 4 mg Oral Q4H PRN Matheus Keith MD   pantoprazole 40 mg Oral Early Morning Pablo Camacho MD   torsemide 100 mg Oral Daily With Sumanth Murrell MD   valproic acid 750 mg Oral Q12H Mich Mccord MD       Subjective/ HPI: Patient seen and examined  Patients overnight issues or events were reviewed with nursing or staff during rounds or morning huddle session  New or overnight issues include the following:     Pt upset overnight that he could not get more insulin overnight  He did have one episode of emesis overnight   He has not eaten breakfast today  His mother states that he was afraid he was going to die overnight due to the persistently elevated blood sugars  He has a continuous monitor and his blood sugars were close to 400 all night  Pt's mother reports that when he was at [address] he was able to manage his blood sugars himself  He and she would prefer that he be allowed to do so here as well  Endocrinology to see pt today  ROS:   A 10 point ROS was performed; negative except as noted above  Imaging:     No orders to display       *Labs /Radiology studiesReviewed  *Medications Reviewed and reconciled as needed  *Please refer to order section for additional ordered labs studies  *Case discussed with primary attending during morning huddle case rounds    Physical Examination:  Vitals:   Vitals:    03/02/20 2100 03/03/20 0633 03/03/20 0809 03/03/20 0853   BP: 170/80 (!) 172/80 (!) 172/88    BP Location: Left arm Right arm Right arm    Pulse:  82 82    Resp:  18     Temp:  98 4 °F (36 9 °C)     TempSrc:  Oral     SpO2:  95%     Weight:    102 kg (225 lb 15 5 oz)   Height:           General Appearance: no distress, conversive  HEENT: PERRLA, conjuctiva normal; oropharynx clear; mucous membranes moist;   Neck:  Supple, no lymphadenopathy or thyromegaly  Lungs: CTA, normal respiratory effort, no retractions, expiratory effort normal  CV: regular rate and rhythm , PMI normal   ABD: soft non tender, no masses , no hepatic or splenomegaly  EXT: DP pulses intact, no lymphadenopathy, no edema  Skin: normal turgor, normal texture, no rash  Psych: affect normal, mood normal  Neuro: AAOx3      The above physical exam was reviewed and updated as determined by my evaluation of the patient on 3/3/2020      Invasive Devices     None                    VTE Pharmacologic Prophylaxis: Heparin  Code Status: Level 1 - Full Code  Current Length of Stay: 4 day(s)      Total time spent:  30 minutes with more than 50% spent counseling/coordinating care  Counseling includes discussion with patient re: progress  and discussion with patient of his/her current medical state/information  Coordination of patient's care was performed in conjunction with primary service  Time invested included review of patient's labs, vitals, and management of their comorbidities with continued monitoring  In addition, this patient was discussed with medical team including physician and advanced extenders  The care of the patient was extensively discussed and appropriate treatment plan was formulated unique for this patient  ** Please Note:  voice to text software may have been used in the creation of this document   Although proof errors in transcription or interpretation are a potential of such software**

## 2020-03-03 NOTE — PCC OCCUPATIONAL THERAPY
Pt is a 37year old male who was seen for an OT evaluation following admission to hospitals due with complaints of dizziness x2 days  Patient has a history of CVA x2 with resultant chronic vertigo  Patient also states some word-finding difficulty  Prior to admission pt was completing ADL's independently, and use a SPC for community mobility  Pt was not driving, and had a life alert    Currently, pt requires Min-Mod assist for overall ADLs, and requires Min-Mod assist for transfers using RW  Pt is currently limited due to the following deficits impacting occupational performance, activity tolerance, endurance, standing balance/tolerance and sitting balance/tolerance  The patient has shown a decline from prior level of function  The patient participates in identification of personal goals to address in Occupational Therapy  Pt benefits from skilled OT services for I/ADL retraining to support the ability to safely participate in daily occupations safely and independently in the most least restrictive way  From OT standpoint, Pt demonstrates Good rehab potential to meet LTG's  Pt is a good rehab candidate, Anticipate 10-14 day length of stay  Occupational Therapy plan of care to focus on the following areas:  ADL re-training, [address] training, IADL re-training, functional transfers, standing tolerance, standing balance, DME training/education, family training/education, and EC techniques/education

## 2020-03-03 NOTE — PROGRESS NOTES
[address] [facility] NOTE   Elida Mckeon 37 y o  male MRN: [id number]  Unit/Bed#: -87 Encounter: [id number]  Reason for Consult: ESRD on PD    ASSESSMENT and PLAN:    36 yo male with PMHx of ESRD on PD, Type I DM, HTN, HPL, CVA, homozygous for C677T MTHFR mutation and heterozygote for prothombin gene mutation, who initially presented on 2/20 with dizziness, lightheaded to [facility], and had seizure initially also and was initially intubatedand sedated  Transferred to [facility] for further evaluation and care     1) ESRD - not anuric     - outpatient unit [facility]  - access - PD catheter  - outpatient prescription is 4 cycles of 2 2 L with last fill 1 5 L for total 10 hours  - PD prescription adjusted to 2 5% alt with 1 5% dwells alternating due to hyperglycemia and improved weight and last fill icodextran and icodextran discontinued over weekend  - now pt is on all 1 5% dwells currently with hep in dwells due to fibrin --> clearing per discussing with Nursing team  - gent ointment to catheter  - EDW 94 kg at home but per pt and mother, weight has been increasing at home too  - check daily weight --> slightly improved today  - may need to increase PD dwell % tomorrow if gains weight or edema worsening  - cont PD with 1 5% dwells for now  - cont current antiHTN today  Has prn clonidine in addition to current regimen     2) seizure like activity     - per Neurology team  - on valproic acid     3) anemia     - no HETAL due to seizure (was on HETAL as outpatient  - iron level - ferritin 766  Iron sat 84%  Not iron deficient     4) MBD      - cont phos binder, sensipar, vit D  - phos 5 6 on 3/2   Monitor with binders     5) HTN     - on clonidine, hydralazine, labetalol, torsemide, doxazosin, lisinopril, nifedipine  - high risk of polypharmacy  - if remains stable, may consider lowering or holding doxazosin and increasing another another in 24-48 hours     6) electrolytes - stable on 3/3     7) acid/base - bicarb stable    SUBJECTIVE / INTERVAL HISTORY:    SBP rising 386K systolic  Weight 102 kg (filled)  Pt with nausea overnight  BS elevated overnight       OBJECTIVE:  Current Weight: Weight - Scale: 102 kg (225 lb 15 5 oz)  Vitals:    03/02/20 2100 03/03/20 0633 03/03/20 0809 03/03/20 0853   BP: 170/80 (!) 172/80 (!) 172/88    BP Location: Left arm Right arm Right arm    Pulse:  82 82    Resp:  18     Temp:  98 4 °F (36 9 °C)     TempSrc:  Oral     SpO2:  95%     Weight:    102 kg (225 lb 15 5 oz)   Height:           Intake/Output Summary (Last 24 hours) at 3/3/2020 8629  Last data filed at 3/2/2020 1800  Gross per 24 hour   Intake 480 ml   Output    Net 480 ml     General: NAD  Skin: no rash  Eyes: anicteric sclera  ENT: moist mucous membrane  Neck: supple  Chest: CTA b/l, no ronchii, no wheeze, no rubs, no rales  CVS: s1s2, no murmur, no gallop, no rub  Abdomen: soft, nontender, nl sounds  Extremities: trace edema LE b/l improved  : no preston  Neuro: AAOX3  Psych: normal affect    Medications:    Current Facility-Administered Medications:     acetaminophen (TYLENOL) tablet 650 mg, 650 mg, Oral, Q8H PRN, Mateo Vincent MD    aspirin chewable tablet 81 mg, 81 mg, Oral, Daily, Mateo Vincent MD, 81 mg at 03/02/20 0810    atorvastatin (LIPITOR) tablet 40 mg, 40 mg, Oral, HS, Mateo Vincent MD, 40 mg at 03/02/20 2119    calcium acetate (PHOSLO) capsule 667 mg, 667 mg, Oral, TID With Meals, Bob Coon MD, 667 mg at 03/02/20 1630    chlorhexidine (PERIDEX) 0 12 % oral rinse 15 mL, 15 mL, Swish & Spit, Q12H [address], Mateo Vincent MD, 15 mL at 03/03/20 0811    cholecalciferol (VITAMIN D3) tablet 2,000 Units, 2,000 Units, Oral, Daily, Bob Coon MD, 2,000 Units at 03/02/20 0811    cinacalcet (SENSIPAR) tablet 90 mg, 90 mg, Oral, Daily, Mateo Vincent MD, 90 mg at 03/02/20 0815    cloNIDine (CATAPRES) tablet 0 1 mg, 0 1 mg, Oral, Q8H [address], Mateo Vincent MD, 0 1 mg at 03/03/20 8217   cloNIDine (CATAPRES) tablet 0 1 mg, 0 1 mg, Oral, Q8H PRN, Silva Lam DO    dextrose 50 % IV solution 25 g, 25 g, Intravenous, PRN, Jens Mustafa MD    diphenhydrAMINE (BENADRYL) 50 mg in sodium chloride 0 9 % 50 mL IVPB, 50 mg, Intravenous, Daily PRN, Jens Mustafa MD    doxazosin (CARDURA) tablet 2 mg, 2 mg, Oral, HS, Mateo Vincent MD, 2 mg at 03/02/20 2119    gabapentin (NEURONTIN) capsule 100 mg, 100 mg, Oral, HS, Mateo Vincent MD, 100 mg at 03/02/20 2119    gentamicin (GARAMYCIN) 0 1 % ointment 1 application, 1 application, Topical, Daily, Jens Mustafa MD, 1 application at 98/27/63 2202    heparin (porcine) subcutaneous injection 5,000 Units, 5,000 Units, Subcutaneous, Q8H [facility], Jens Mustafa MD, 5,000 Units at 03/03/20 1676    hydrALAZINE (APRESOLINE) tablet 50 mg, 50 mg, Oral, TID, Jens Mustafa MD, 50 mg at 03/02/20 2119    insulin glargine (LANTUS) subcutaneous injection 20 Units 0 2 mL, 20 Units, Subcutaneous, HS, Silva Lam DO    insulin lispro (HumaLOG) 100 units/mL subcutaneous injection 1-5 Units, 1-5 Units, Subcutaneous, TID AC, 2 Units at 03/03/20 0803 **AND** Fingerstick Glucose (POCT), , , TID AC, Mateo Vincent MD    insulin lispro (HumaLOG) 100 units/mL subcutaneous injection 1-5 Units, 1-5 Units, Subcutaneous, HS, Mateo Vincent MD, 1 Units at 03/02/20 2118    insulin lispro (HumaLOG) 100 units/mL subcutaneous injection 8 Units, 8 Units, Subcutaneous, TID With Meals, Silva Lam DO, 4 Units at 03/03/20 0817    labetalol (NORMODYNE) tablet 300 mg, 300 mg, Oral, Q12H [facility], Mateo Vincent MD, 300 mg at 03/03/20 0810    lactobacillus acidophilus-bulgaricus (FLORANEX) packet 1 packet, 1 packet, Oral, HS, Jens Mustafa MD, 1 packet at 03/02/20 2231    lisinopril (ZESTRIL) tablet 40 mg, 40 mg, Oral, Daily, Mateo Vincent MD, 40 mg at 03/02/20 0809    NIFEdipine (PROCARDIA XL) 24 hr tablet 60 mg, 60 mg, Oral, BID, Mateo Vincent MD, 60 mg at 03/03/20 0810    ondansetron (ZOFRAN-ODT) dispersible tablet 4 mg, 4 mg, Oral, Q8H [facility], Mateo Vincent MD, 4 mg at 03/03/20 0630    ondansetron (ZOFRAN-ODT) dispersible tablet 4 mg, 4 mg, Oral, Q4H PRN, Donney Severe, MD, 4 mg at 03/03/20 0151    pantoprazole (PROTONIX) EC tablet 40 mg, 40 mg, Oral, Early Morning, Mateo Vincent MD, 40 mg at 03/03/20 6786    torsemide (DEMADEX) tablet 100 mg, 100 mg, Oral, Daily With Lunch, Tarun Hinds MD, 100 mg at 03/02/20 1143    valproic acid (DEPAKENE) capsule 750 mg, 750 mg, Oral, Q12H [facility], Tarun Hinds MD, 750 mg at 03/03/20 3357    Laboratory Results:  Results from last 7 days   Lab Units 03/03/20  0644 03/02/20  0636 02/26/20  0500   WBC Thousand/uL  --  6 36  --    HEMOGLOBIN g/dL  --  9 1*  --    HEMATOCRIT %  --  27 2*  --    PLATELETS Thousands/uL  --  327  --    POTASSIUM mmol/L 4 4 4 0 3 8   CHLORIDE mmol/L 94* 94* 98*   CO2 mmol/L 28 25 22   BUN mg/dL 59* 61* 50*   CREATININE mg/dL 13 70* 13 70* 14 50*   CALCIUM mg/dL 9 0 8 7 8 9   PHOSPHORUS mg/dL  --  5 6*  --

## 2020-03-03 NOTE — PROGRESS NOTES
Spent increased time with patient and mother this am r/t BS monitoring and coverage  Also nausea is making difficult to keep meds down  Gave some BP meds and will return after patient tolerates those to give rest  Will continue to monitor

## 2020-03-03 NOTE — PCC PHYSICAL THERAPY
4/2/2020   engaged in skilled PT focus on  education with stroke and risk factors of CVA/ strokes , pt instructed with awareness and sxs if stroke, outcome and inc higher risk if having one or more stroke   Pt very impulsive with mobility attempting to move prior to therapist being within reach and prior to removal of obstacles  Pt unsafe with RW currently as he turns quickly, does not keep RW on ground  Pt seem to negotiation SPC better  with less effort, pt moves better but less stability   Pt cont to have dec blance awareness d/t vision deficits    Pt will cont to need skilled PT  to meet goals , pt limited  cosmetic gait holds LLE arm/ elbow  at 90 * during ambulation as above

## 2020-03-03 NOTE — PROGRESS NOTES
03/03/20 1000   Pain Assessment   Pain Assessment No/denies pain   Restrictions/Precautions   Precautions Bed/chair alarms; Fall Risk;Supervision on toilet/commode   Cognition   Arousal/Participation Cooperative   Subjective   Subjective pt reported feeling tired and nauseated; nursing aware    Sit to Stand   Type of Assistance Needed Supervision   Sit to Stand CARE Score 4   Bed-Chair Transfer   Type of Assistance Needed Supervision; Adaptive equipment;Physical assistance   Amount of Physical Assistance Provided Less than 25%   Chair/Bed-to-Chair Transfer CARE Score 3   Transfer Bed/Chair/Wheelchair   Limitations Noted In Balance; Endurance;UE Strength;LE Strength   Adaptive Equipment Cane   Stand Pivot Minimal Assist;Supervision   Sit to Stand Supervision   Stand to Sit Supervision   Walk 10 Feet   Type of Assistance Needed Physical assistance; Adaptive equipment   Amount of Physical Assistance Provided Less than 25%   Walk 10 Feet CARE Score 3   Ambulation   Findings walked 25' x2 into and out of bathroom, hands on hips and pt used [facility]    Toilet Transfer   Type of Assistance Needed Physical assistance; Adaptive equipment   Amount of Physical Assistance Provided Less than 25%   Toilet Transfer CARE Score 3   Toilet Transfer   Surface Assessed Standard Toilet   Limitations Noted In Balance; Endurance;UE Strength;LE Strength   Adaptive Equipment Grab Bar   Findings CGA-Iveth   Other Comments   Comments session foucsed on pt ed with pt and his his mom  Pt wasn't feeling well; worked on mobility to bathroom and in bathroom, standing at sink to wash hands too  Talked about fall risk factors, dec righting reactions, etc and difference btween RW vs SPC  Reviewed fatigue fluctuates adn how this impacts balance/righting reactions  Discussed recommendation is to use RW when fatigued or by himself and [facility] when family is available to assist, as if pt trips he will most likely fall   Pt's mom reported she will remove the small area rugs to dec risk of tripping  Discussed will cont with mobility this afternoon  Assessment   Treatment Assessment Pt cont to present wtih need for S-Ba at times based upon dec balance/righting reactions  Discussed wtih pt and his mom that another goal of PT is to progress activity tolerance so his endurance can be better so he can do more things, as well as work with medical team so he feels better/less nauseated and can perform more in therapy  After Team meeting it was discussed that pt may leave at the end of the day today  Reviewed that he could do this from a mobility standpoint as long as family is willing to provide S/Ba as needed for safety precautions  Family/Caregiver Present mom   Barriers to Discharge Comments pt reported that there is a banister he can hold onto for the NIRU    PT Barriers   Physical Impairment Decreased strength;Decreased range of motion;Decreased endurance; Impaired balance;Decreased mobility   Functional Limitation Car transfers;Stair negotiation;Standing;Transfers; Walking   Plan   Treatment/Interventions Functional transfer training;LE strengthening/ROM; Elevations; Therapeutic exercise; Endurance training;Patient/family training;Equipment eval/education; Bed mobility;Gait training   Progress Progressing toward goals   Recommendation   Recommendation Outpatient PT; Home with family support   Equipment Recommended Walker;Cane   PT Therapy Minutes   PT Time In 1000   PT Time Out 1030   PT Total Time (minutes) 30   PT Mode of treatment - Individual (minutes) 30   PT Mode of treatment - Concurrent (minutes) 0   PT Mode of treatment - Group (minutes) 0   PT Mode of treatment - Co-treat (minutes) 0   PT Mode of Treatment - Total time(minutes) 30 minutes   PT Cumulative Minutes 210   Therapy Time missed   Time missed?  No

## 2020-03-03 NOTE — PROGRESS NOTES
Assessment:  78-year-old type 1 diabetic with multiple complications including peritoneal dialysis and gastroparesis      Plan:  1  Type 1 diabetes with hyperglycemia  He reports variable appetite and variable p o  Intake associated with a scheduled mealtime insulin coverage leading to variability in the blood glucose  Last 24 hour blood glucose was in  range  He does admit to intermittent snacks  He will get dextrose 1 25 % bath with peritoneal dialysis today  He prefers to go back to twice a day Lantus regimen along with mealtime coverage  He is also contemplating discharged to follow-up with rehab as outpatient  For now will continue with Lantus 16 units at bedtime and Humalog 6 units with each meal along with correctional insulin  If patient would stay, may consider titrating to twice a day basal regimen as he takes at home along with an insulin to carb ratio of 1 is to 6 to allow for variability in mealtime coverage insulin  Continue correctional insulin  S:  Patient is frustrated with the inability to control his labile blood glucose  Had a blood glucose of 373 last night after having some pretzels for a previous low blood glucose  Was given 3 units Humalog 1 time at 2:00 a m  He reports variable appetite and variable p o  Intake associated with a scheduled mealtime insulin coverage leading to variability in the blood glucose  O:  Patient seen and examined personally  /76 (BP Location: Left arm)   Pulse 82   Temp 98 4 °F (36 9 °C) (Oral)   Resp 18   Ht 5' 11" (1 803 m)   Wt 102 kg (225 lb 15 5 oz)   SpO2 95%   BMI 31 52 kg/m²     Physical Exam   Constitutional: He is oriented to person, place, and time  HENT:   Head: Normocephalic  Mouth/Throat: Oropharynx is clear and moist    Eyes: Pupils are equal, round, and reactive to light  EOM are normal    Musculoskeletal: He exhibits no deformity  Neurological: He is alert and oriented to person, place, and time  Vitals reviewed          Current Facility-Administered Medications:  acetaminophen 650 mg Oral Q8H PRN David Linares MD   aspirin 81 mg Oral Daily Mateo Vincent MD   atorvastatin 40 mg Oral HS Mateo Vincent MD   calcium acetate 667 mg Oral TID With Meals David Linares MD   chlorhexidine 15 mL Swish & Spit Q12H [address] MD Jesi   cholecalciferol 2,000 Units Oral Daily David Linares MD   cinacalcet 90 mg Oral Daily Mateo Vincent MD   cloNIDine 0 1 mg Oral Q8H PRN Ctahy Vasquez DO   cloNIDine 0 1 mg Oral Q8H [facility] Umair Stark MD   dextrose 25 g Intravenous PRN Mateo Vincent MD   diphenhydrAMINE (BENADRYL) IVPB 50 mg Intravenous Daily PRN Mateo Vincent MD   doxazosin 2 mg Oral HS Umair Stark MD   gabapentin 100 mg Oral HS Mateo Vinecnt MD   gentamicin 1 application Topical Daily Mateo Vincent MD   glucose 16 g Oral PRN Deonna Alatorre PA-C   heparin (porcine) 5,000 Units Subcutaneous Q8H [facility] Mateo Vincent MD   hydrALAZINE 50 mg Oral TID Mateo Vincent MD   insulin glargine 20 Units Subcutaneous HS Cathy Vasquez DO   insulin lispro 1-5 Units Subcutaneous TID AC Sasha Ascencio MD   insulin lispro 1-5 Units Subcutaneous HS Sasha Ascencio MD   insulin lispro 6 Units Subcutaneous TID With Meals Sasha Ascencio MD   labetalol 300 mg Oral Q12H [facility] Mateo Vincent MD   lactobacillus acidophilus-bulgaricus 1 packet Oral HS Mateo Vincent MD   lisinopril 40 mg Oral Daily Mateo Vincent MD   NIFEdipine 60 mg Oral BID Mateo Vincent MD   ondansetron 4 mg Oral Q8H [facility] Mateo Vincent MD   ondansetron 4 mg Oral Q4H PRN Serafin Carrero MD   pantoprazole 40 mg Oral Early Morning David Linares MD   torsemide 100 mg Oral Daily With Lunch David Linares MD   valproic acid 750 mg Oral Q12H [facility] Mateo Vincent MD        Lab, Imaging and other studies:   POC Glucose (mg/dl)   Date Value   03/03/2020 85   03/03/2020 290 (H)   03/03/2020 373 (H) 03/02/2020 214 (H)   03/02/2020 150 (H)   03/02/2020 296 (H)   03/02/2020 190 (H)   03/01/2020 343 (H)   03/01/2020 279 (H)   03/01/2020 129

## 2020-03-03 NOTE — NURSING NOTE
Patient discharged to home with mother providing transportation  scripts picked up at pharmacy  Instructions given

## 2020-03-03 NOTE — TREATMENT PLAN
Individualized Plan of Miranda Mckeon 37 y o  male MRN: [id number]  Unit/Bed#: -01 Encounter: [id number]     PATIENT INFORMATION  ADMISSION DATE: 2/28/2020  2:17 PM MARIELENA CATEGORY:Neurologic Conditions:  03 9  Other Neurologic Disorder seizures   ADMISSION DIAGNOSIS: Seizures ([facility] [address] ) [R56 9]  Hypertension [I10]  EXPECTED LOS: 4 to 7 days     MEDICAL/FUNCTIONAL PROGNOSIS  Based on my assessment of the patient's medical conditions and current functional status, the prognosis for attaining medical and functional goals or the IRF stay is:  Good    Medical Goals: Patient will be medically stable for discharge to [facility] upon completion of rehab program    [address]: Home - with supervision     ANTICIPATED FOLLOW-UP SERVICE:   Outpatient Therapy Services: PT and OT      Home Health Services: PT, OT and Nursing     DISCIPLINE SPECIFIC PLANS:  Required Disciplines & Services: Rehabillitation Nursing, Case Management, Dietay/Nutrition and Diabetes Education    REQUIRED THERAPY:  Therapy Hours per Day Days per Week Total Days   Physical Therapy 1 5 5 5   Occupational Therapy 1 5 5 5   NOTE: Additional therapy time(s) may be added as appropriate to meet patient needs and to achieve functional goals      Patient will either participate in above therapy regimen or participate in 900 minutes of therapy within 7 day week consisting of PT and OT due to the following medical procedure/condition:Neurologic Conditions:  03 9  Other Neurologic Disorder seizures    ANTICIPATED FUNCTIONAL OUTCOMES:  ADL:  Supervision using least restrictive assistive device   Bladder/Bowel:   Supervision using least restrictive assistive device   Transfers:   Supervision using least restrictive assistive device   Locomotion:   Supervision using least restrictive assistive device   Cognitive:  patient at cog baseline     DISCHARGE PLANNING NEEDS  Equipment needs: Discharge needs to be reviewed with team    REHAB ANTICIPATED PARTICIPATION RESTRICTIONS:  None

## 2020-03-04 ENCOUNTER — TRANSITIONAL CARE MANAGEMENT (OUTPATIENT)
Dept: INTERNAL MEDICINE CLINIC | Facility: CLINIC | Age: 35
End: 2020-03-04

## 2020-03-04 ENCOUNTER — EVALUATION (OUTPATIENT)
Dept: PHYSICAL THERAPY | Facility: CLINIC | Age: 35
End: 2020-03-04
Payer: MEDICARE

## 2020-03-04 DIAGNOSIS — R56.9 SEIZURES (HCC): ICD-10-CM

## 2020-03-04 DIAGNOSIS — R26.9 GAIT ABNORMALITY: Primary | ICD-10-CM

## 2020-03-04 PROCEDURE — 97162 PT EVAL MOD COMPLEX 30 MIN: CPT | Performed by: PHYSICAL THERAPIST

## 2020-03-04 NOTE — PROGRESS NOTES
PT Evaluation     Today's date: 3/4/2020  Patient name: Dante Hirsch  : 1985  MRN: [id number]  Referring provider: Melton Libman, MD  Dx:   Encounter Diagnosis     ICD-10-CM    1  Gait abnormality R26 9    2  Seizures ([address] ) R56 9                   Assessment  Assessment details: Pt is a 37year old male referred with impaired mobility and ADLs secondary to new onset of seizures  Pt presented today with impairments in decreased standing balance, decreased endurance (6MWT TBA NV, limited today due to low blood sugar, stopped at 4min 30s), (+) fall risk per his DGI and 5xSTS scores, decreased BLE strength all which limit him functionally with gait and elevations  Pt will benefit from PT services needed to address above impairments, reduce fall risk, and return to his prior gym routine at home  Impairments: abnormal gait, activity intolerance, impaired balance, impaired physical strength and lacks appropriate home exercise program  Understanding of Dx/Px/POC: good   Prognosis: good    Goals  ST  Pt will improve gait speed to at least 0 75 m/s with least restrictive device within 4 weeks needed to improve safety with ambulation  2  Pt will improve DGI score by at least 3 points within 4 weeks needed to reduce fall risk  4  Pt will demonstrate independence with HEP within 4 weeks  LT  Pt will improve gait speed to at least 1 m/s with least restrictive device within 8 weeks needed to improve safety with ambulation  2  Pt will improve DGI score to at least 22/24 within 8 weeks needed to reduce fall risk  3  Pt will improve 5xSTS score to <15 sec within 8 weeks needed to reduce fall risk  4  Pt will be able to maintain balance on nomcompliant surfaces with eyes closed for 30 seconds within 8 weeks needed to reduce fall risk  5  Pt will demonstrate independence with HEP within 8 weeks      Plan  Patient would benefit from: skilled physical therapy  Planned therapy interventions: neuromuscular re-education, patient education, therapeutic activities, therapeutic exercise, balance, home exercise program and gait training  Frequency: 2x week  Duration in weeks: 8  Plan of Care beginning date: 3/4/2020  Plan of Care expiration date: 5/1/2020  Treatment plan discussed with: patient        Subjective Evaluation    History of Present Illness  Mechanism of injury: Admitted to hospital with seizure, has history of strokes and has had 2 CVAs in the past  Admitted 2/20, was discharged from [facility] yest 3/3  Has lost muscle mass  Uses rollator in community  At house furniture walks and uses [facility]  Denies falls since his seizures  Prior to seizures was walking with no AD around the house and used [facility] for community, would like to return to that  Also has a TM at home and had a good home program he did, would like to get to that as well    Pain  No pain reported    Social Support  Steps to enter house: yes  Stairs in house: yes   Lives in: multiple-level home  Lives with: parents    Hand dominance: right    Patient Goals  Patient goals for therapy: improved balance, increased strength and return to sport/leisure activities          Objective     Strength/Myotome Testing     Left Hip   Planes of Motion   Flexion: 5  Extension: 3+  Abduction: 4-    Right Hip   Planes of Motion   Extension: 3+  Abduction: 4-    Left Knee   Prone flexion: 5  Extension: 5    Right Knee   Prone flexion: 5  Extension: 5    Left Ankle/Foot   Dorsiflexion: 3+  Plantar flexion: 5 (in stting)    Right Ankle/Foot   Dorsiflexion: 3+  Plantar flexion: 5 (in sitting)  Neuro Exam:     Functional outcomes   6 minute walk test: attempted  5x sit to stand: 19 (seconds)      Balance assessments   MCTSIB   Eyes open level surface: 30  Eyes open foam surface: 2  Eyes closed level surface: 30  Eyes closed foam surface: 0    DGI: 11/24 (without AD)    Gait speed:  69 m/s without AD  Deviations: increased COLBY, decreased arm swing (high guard with BUE), increased med/lat sway, decreased heel strike B (foot flat at initial contact), decreased speed    140/87, 98 bpm, blood sugars 60s-90s - mid session     Precautons: DMI, seizures, hx CVAs   HTN  Short Term Goal Expiration Date:(4/3/20)  Long Term Goal Expiration Date: (5/1/20)  POC Expiration Date: (5/1/20)         Manual                                                     Exercise Diary         TM        Step-ups R & L        Semitandem         Tandem gait        Side-stepping        BWD walking                                                                                                                            Modalities

## 2020-03-04 NOTE — DISCHARGE SUMMARY
Discharge Summary - Tee Mckeon 37 y o  male MRN: [id number]  Unit/Bed#: [facility] 179-64 Encounter: [id number]    Admission Date: 2/28/2020     Discharge Date: 3/3/2020    Etiologic/Rehabilitation Diagnosis: Impairment of mobility, safety and Activities of Daily Living (ADLs) due to Neurologic Conditions:  03 9  Other Neurologic Disorder seizures    HPI: Bijan Bella is a 37 y o  male who presented to the [address] [place] [address] with status epilepticus on admission  Given ativan, versed and depacon on admission with no break in seizures  Resulted in patient being intubated and started on propofol drip  It was believed seizures were secondary to uncontrolled HTN  Patient was started on video EEG  Seizures did resolve, and patient able to be extubated  Accepted to [facility] on 2/28/20  Procedures Performed During [facility] Admission: None    Acute Rehabilitation Center Course: Patient participated in a comprehensive interdisciplinary inpatient rehabilitation program which included involvment of MD, therapies (PT, OT, and/or SLP), RN, CM, SW, dietary, and psychology services  He was able to be advanced to an overall supervision level of assist and considered safe for discharge home with family  Please see below for patient's day to day management of medical needs  * Impaired mobility and ADLs  Assessment & Plan  · Acute comprehensive interdisciplinary inpatient rehabilitation including PT, OT, RN, CM, SW, dietary, psychology, etc   · Goal: modified independent    Left arm pain  Assessment & Plan  · Imaging reveals no acute fracture, but patient noted to have joint effusion, as well as "Significant joint space narrowing or bony erosion   Olecranon enthesopathy noted"  · Will require orthopedic f/u    Peripheral polyneuropathy  Assessment & Plan  · Continue gabapentin    Diabetic gastroparesis ([facility] [address] )  Assessment & Plan  · Per patient and family, improved with smaller, frequent meals    Dyslipidemia  Assessment & Plan  · Continue statin    Peritoneal dialysis catheter in place [facility])  Assessment & Plan  Results from last 7 days   Lab Units 03/03/20  0644 03/02/20  0636 02/26/20  0500   CREATININE mg/dL 13 70* 13 70* 14 50*     · Continue monitoring kidney function via intermittent BMP  · Avoid nephrotoxic meds/NSAIDs  · Patient performs PD at home  · Nephrology team following, appreciate recs      Anemia  Assessment & Plan  Results from last 7 days   Lab Units 03/02/20  0636   HEMOGLOBIN g/dL 9 1*     · Monitor CBC intermittently  · Transfuse for Hgb <7      Renovascular hypertension  Assessment & Plan  Temp:  [97 9 °F (36 6 °C)-98 4 °F (36 9 °C)] 97 9 °F (36 6 °C)  HR:  [76-82] 78  Resp:  [18] 18  BP: (136-173)/(70-88) 136/86    · IM/nephrology service managing anti-hypertensives, adjusting as needed    Type 1 diabetes mellitus with hypoglycemia [facility])  Assessment & Plan    Recent Labs     03/02/20  2110 03/03/20  0155 03/03/20  0639 03/03/20  1059   POCGLU 214* 373* 290* 85     · Last A1C: 5 6  · Continue diabetic diet  · Continue SSI  · On Lantus, Humalog   · Endocrinology monitoring blood sugars, adjusting regimen as needed  Patient is to resume home regimen on discharge  They will f/u with [facility] endocrinologist on discharge  Discharge Physical Examination:  General: alert, no apparent distress, cooperative and comfortable  HEENT:  Head: Normocephalic, no lesions, without obvious abnormality    Eye: Normal external eye, conjunctiva, lidsc cornea  Ears: Normal external ears  Nose: Normal external nose, mucus membranes  LUNGS:  no abnormal respiratory pattern, no retractions noted, non-labored breathing   ABDOMEN:  soft, non-tender, non-distended  EXTREMITIES:  edema: 1+ to bilateral LEs (improved as compared to previous)  NEURO:  clear speech, following all commands, oriented x4  PSYCH:  Affect: euthymic    Significant Findings, Care, Treatment and Services Provided: Acute comprehensive interdisciplinary inpatient rehabilitation including PT, OT, SLP, RN, CM, SW, dietary, psychology, etc     Complications: none    Functional Status Upon Admission to [facility]:  Mobility: min assit  Transfers: min assist  ADLs: min assist    Functional Status Upon Discharge from ARC:   Physical Therapy Occupational Therapy   Weight Bearing Status: Full Weight Bearing  Transfers: Minimal Assistance  Bed Mobility: Incidental Touching, Minimal Assistance  Amulation Distance (ft): 150 feet  Ambulation: Minimal Assistance  Assistive Device for Ambulation: Single [employer]  Discharge Recommendations: Home with:  [address] with[de-identified] 24 Hour Assisteance, 24 Hour Supervision, Family Support, Outpatient Physical Therapy, Home Physical Therapy   Eating: Independent  Grooming: Minimal Assistance  Bathing: Minimal Assistance  Bathing: Minimal Assistance  Upper Body Dressing: Minimal Assistance  Lower Body Dressing: Minimal Assistance  Toileting: Minimal Assistance  Tub/Shower Transfer: Minimal Assistance  Toilet Transfer: Minimal Assistance  Cognition: Within Defined Limits  Orientation: Person, Place, Time, Situation         Discharge Diagnosis: Impairment of mobility, safety and Activities of Daily Living (ADLs) due to Neurologic Conditions:  03 9  Other Neurologic Disorder seizures    Discharge Medications:   See after visit summary for reconciled discharge medications provided to patient and family  Condition at Discharge: stable     Discharge instructions/Information to patient and family:   See after visit summary for information provided to patient and family  Provisions for Follow-Up Care:  See after visit summary for information related to follow-up care and any pertinent home health orders        Future Appointments   Date Time Provider Dontae Garza   3/5/2020  9:00 AM Bozena Bill, OT BE OT [address]   3/12/2020 10:00 AM DO JENNIFER Rosa [facility]   3/16/2020  2:40 PM Dheeraj Arroyo Nickolas Lacey MD CARD  Practice-[facility]   4/1/2020  6:30 AM DEVICE REMOTE [place] CARD [facility]   5/21/2020  1:00 PM Hola Snider PA-C NEURO [facility]   7/1/2020  7:15 AM DEVICE REMOTE [place] CARD [facility]   10/8/2020  6:30 AM DEVICE REMOTE [place] CARD [facility]   1/7/2021  6:30 AM DEVICE REMOTE [place] CARD [facility]     Disposition: Home    Planned Readmission: No    Discharge Statement   I spent 45 minutes discharging the patient  This time was spent on the day of discharge  I had direct contact with the patient on the day of discharge  Greater than 50% of the total time was spent examining patient, answering all patient questions, arranging and discussing plan of care with patient as well as directly providing post-discharge instructions  Additional time then spent on discharge activities  Discharge Medications:  See after visit summary for reconciled discharge medications provided to patient and family

## 2020-03-05 ENCOUNTER — EVALUATION (OUTPATIENT)
Dept: OCCUPATIONAL THERAPY | Facility: CLINIC | Age: 35
End: 2020-03-05
Payer: MEDICARE

## 2020-03-05 DIAGNOSIS — R56.9 SEIZURE (HCC): Primary | ICD-10-CM

## 2020-03-05 PROCEDURE — 97167 OT EVAL HIGH COMPLEX 60 MIN: CPT

## 2020-03-05 NOTE — PROGRESS NOTES
OCCUPATIONAL THERAPY INITIAL EVALUATION:    3/5/2020  Skip Medina  1985  [id number]  Javy Torres MD  1  Seizure ([facility] [address] )        Subjective    "I FEEL OVERALL JUST WEAKER"    PATIENT GOAL: "To increase my strength and endurance of my UB to prepare for potential organ transplants I may have"      Assessment/Plan    Skilled Analysis:  Pt is a 37 y o  male referred to Occupational Therapy s/p Seizure ([facility] [address] ) [R56 9]  Pt participated in skilled OT evaluation and following formalized testing, presents with the following areas of deficit:/VM skills, weak convergence of L eye>R, dec ocular teaming, dysmetria/undershooting saccadic mvmts, and jerky pursuits, dec endurance, activity tolerance, balance, strength, ROM, sensation deficits, impaired fine motor skills and coordination deficits  Pt does demo the need for skilled Occupational Therapy services 2x/week for 4-6 weeks with focus on aforementioned deficits to address the goals as listed below       Goals:    STG = LTG    MOTOR:     · Pt will increase b/lUE prehension patterns for improved tripod with utensil management with <25% droppage in 4 weeks  · Pt will tolerate IASTM for improved motor and sensory performance for overall improved hand to target with 25% accuracy 4 weeks  · Pt will increase proprioception of b/l UE hand to target for improved functional reach vision occluded with ADL tasks 4 weeks  · Pt will increase automaticity of b/l UE to WNL for improved grasp release of tabletop items for improved functional performance with salient tasks 4 weeks  · Pt will increase b/l UE rate of manipulation for all FM tests for improved functional performance with salient tasks 4 weeks  · Pt will increase RUE to refined, functional assist with <20% cuing for tabletop tasks for improved functional performance of life roles and salient tasks 4 weeks  · Pt will demo with G tolerance to supine, seated, and in stance exercise x 30 minutes with minimal rest breaks required for increased engagement in life roles 4 weeks  · Pt will demo with G carryover of Home Exercise Program to improve functional progression towards goals in Plan of care and for improved functional use of RUE 4 weeks        Vision:  -   - Pt will increase oculomotor control for improved saccades, con/divergent tasks for improved reading, board to table tasks with minimal increase in symptoms 4 weeks  - Pt will tolerate multi-modal envt x 30 min with 80% accuracy of cog load and min increase of symptoms of 2 levels in HA/dizziness/nausea 4 weeks                    Treatment Interventions  -monocular/b/l peripheral field taping   - UE HEP for strengthening and endurance  - UB therapeutic ex  - sensory/proprioceptive activities  - BITS  - fxnl pursuits/saccades  - near point visual tasks          HISTORY OF PRESENT ILLNESS:     Pt is a 37 y o  male who was referred to Occupational Therapy s/p  Seizure ([facility] ) [R56 9]  Pt presented to [facility] due with complaints of dizziness x2 days  Patient has a history of CVA x2 with resultant chronic vertigo  Patient also stated some word-finding difficulty  On 2/21, a RRT was called after patient was found on the floor actively seizing at home  Patient was poorly responsive with alternating left/right deviated gaze  Patient was emergently intubated  Pt then participated in short acute rehab stay and was d/c home 2 days ago  Pt is now home with family support (from parents)  Prior to admission pt was completing ADL's independently, and use a SPC for community mobility  Pt was not driving, and had a life alert   Pt lives in multilevel home with NIRU and full flight upstairs and to basement    PMH:   Past Medical History:   Diagnosis Date    Acute kidney injury ([facility] [address] )     Anxiety     Cerebellar stroke side undetermined 2015 2015,1/2018    [facility], [facility] )     IDDM    Diarrhea     Gastroparesis     History of shingles 2010    History of transfusion 02/2018  Hypertension     Muscle weakness     general unsteadiness    Retinopathy                Pain Levels:     Restin    With Activity:  4    Objective    Impairment Observations:  - UE weakness      UE ROM and Strength Testing (2nd position):     Dominant Hand: right       1  Dynamometer (2nd position) and Pinch Meter (in lbs)    R UE Impaired     Gross Grasp: 85 lbs     Pincer: 12 lbs     Tripod: 17 lbs     Lateral: 16 lbs      L UE Impaired  Gross Grasp: 83 lbs   Pincer: 10 lbs   Tripod: 18lbs   Lateral: 15 lbs    2     9-hole Peg Test:     RUE:   27 02 seconds      LUE:    27 79 seconds    3  Myofilaments(3 61 WNL):     R UE: 3 61     L UE: 4 31       4  AROM: Affected UE bilateral      Shoulder elevation: FULL  Shoulder FF: FULL  Shoulder ABD: FULL  ER/IR: FULL  Elbow ext/flex:FULL  Sup/Pron: FULL  Wrist flex/ext: FULL  Composite: FU  Hook: FULL  Opposition: FULL  Finger to nose: INTACT  Dysdiadochokinesia: INTACT    5  PROM: Affected UE bilateral      shoulder elevation: FULL  Shoulder FF: FULL  Shoulder ABD: FULL  ER/IR: FULL  Elbow ext/flex: FULL  Sup/Pron: FULL  Wrist flex/ext: FULL      Vision    1   Vision Screen: GLASSES (prescription)    Visual acuity, near: R eye: 20/40  L eye20/40  Binocularity, far: ORTHOPHORIA/TROPIA   Binocularity, near: ORTHOPHORIA/TROPIA  Red/Green fusion: AT 9 INCHES  Convergence: WEAK CONVERGENCE OF L EYE >R EYE, DEC B/L TEAMING   Monica Cheng string: Y and suppression of near  Pursuits: jerky  IN ALL PLANES (GREATEST IN HORIZ/DIAGONAL PLANES)  Saccades:  inaccurate and dysmetric UNDERSHOOTING TARGET  Range of Motion: FULL  Visual perceptual midline: AT horizon;  R OF midline                    INTERVENTION COMMENTS:  Diagnosis: Seizure ([facility] [address] ) [R56 9]  Precautions: fall risk, DM, seizures  Insurance: Payor: Otis Doing / Plan: MEDICARE A AND B / Product Type: Medicare A & B Fee for Service /   1 of 10 visits, PN due 2020

## 2020-03-05 NOTE — CASE MANAGEMENT
Team dc summary - pt made progress and returned home w/spouse and contd outpt pt and ot services at [address]  appmts arranged and placed on dc instructions  Pt had all necessary dme  Family present for dc instructions

## 2020-03-06 ENCOUNTER — OFFICE VISIT (OUTPATIENT)
Dept: PHYSICAL THERAPY | Facility: CLINIC | Age: 35
End: 2020-03-06
Payer: MEDICARE

## 2020-03-06 ENCOUNTER — OFFICE VISIT (OUTPATIENT)
Dept: OCCUPATIONAL THERAPY | Facility: CLINIC | Age: 35
End: 2020-03-06
Payer: MEDICARE

## 2020-03-06 ENCOUNTER — TELEPHONE (OUTPATIENT)
Dept: NEUROLOGY | Facility: CLINIC | Age: 35
End: 2020-03-06

## 2020-03-06 DIAGNOSIS — R56.9 SEIZURE (HCC): Primary | ICD-10-CM

## 2020-03-06 DIAGNOSIS — R26.9 GAIT ABNORMALITY: Primary | ICD-10-CM

## 2020-03-06 DIAGNOSIS — R56.9 SEIZURES (HCC): ICD-10-CM

## 2020-03-06 PROCEDURE — 97112 NEUROMUSCULAR REEDUCATION: CPT | Performed by: PHYSICAL THERAPIST

## 2020-03-06 PROCEDURE — 97110 THERAPEUTIC EXERCISES: CPT | Performed by: PHYSICAL THERAPIST

## 2020-03-06 PROCEDURE — 97110 THERAPEUTIC EXERCISES: CPT

## 2020-03-06 PROCEDURE — 97530 THERAPEUTIC ACTIVITIES: CPT

## 2020-03-06 NOTE — PROGRESS NOTES
Occupational Therapy Daily Note:    Today's date: 3/6/2020  Patient name: Shavon Perez  : 1985  MRN: [id number]  Referring provider: Aparna Anna MD  Dx:   Encounter Diagnosis   Name Primary?  Seizure ([facility] ) Yes                  Subjective: "Im feeling pretty good after PT"    Objective: Pt seen for OT treatment session focusing on /VM skills and HEP development and education  Pt edu on HEP for use of theraputty with mixture of medium soft and hard theraputty  Pt provided with verbal education and handout for carryover for FMS exercises  Pt demo ability to perform and carryout exercises 3 x 10-15 in home environment  Instructed on recommendations to complete 2-3 exercises daily to avoid muscle fatigue    Pt completed multimatrix x 2 trials with standard and alternating figure ground/visual closure cards  Pt req inc time with alternating attention task however was able to complete indep without cues  No c/o HA or eye strain throughout    Assessment: Tolerated treatment well  Patient would benefit from continued skilled OT  Plan: Continued skilled OT per POC      INTERVENTION COMMENTS:  Diagnosis: Seizure ([facility] ) [R56 9]  Precautions: fall risk, DM, seizures  Insurance: Payor: Delisa Summers / Plan: MEDICARE A AND B / Product Type: Medicare A & B Fee for Service /   2 of 10 visits, PN due 2020

## 2020-03-06 NOTE — TELEPHONE ENCOUNTER
----- Message from Delvin Zaman PA-C sent at 2/24/2020 10:55 AM EST -----  Regarding: HFU  Diagnosis/Reason for follow-up:  Provoked seizure  Subspecialty for follow-up:  Epilepsy, Dr Gloria Davis   Recommended timing for HFU:  4-6 weeks  Existing neurologist:  Dr Penn January   Tests/Labs/Imaging ordered:  None   Orders placed electronically:  None  Additional notes:  None    Thank you!

## 2020-03-06 NOTE — PROGRESS NOTES
Daily Note     Today's date: 3/6/2020  Patient name: Concepcion Negro  : 1985  MRN: [id number]  Referring provider: Fallon Everett MD  Dx:   Encounter Diagnosis     ICD-10-CM    1  Gait abnormality R26 9    2  Seizures ([address] ) R56 9                   Subjective: No new complaints  Objective: See treatment diary below    6MWT: 740 ft no AD    Assessment: Tolerated treatment fair, however pt demo very poor endurance and required multiple seated rest breaks throughout the session  Completed 6 MWT today with no AD  Pt demo multiple episodes of LOB throughout the session laine during static standing or turns however pt was always able to self recover  Patient would benefit from continued PT      Plan: Progress treatment as tolerated  Precautons: DMI, seizures, hx CVAs   HTN  Short Term Goal Expiration Date:(4/3/20)  Long Term Goal Expiration Date: (20)  POC Expiration Date: (20)        Manual                                                                                          Exercise Diary   3/6           TM  1 5 mph, 10 min            Step-ups R & L  fw 10x           Semitandem              Tandem gait  2 laps           Side-stepping  OTB 3 laps           BWD walking  3 laps // bars            FAEO foam 30" x 3            Alt step tap 6" 20x            FTEO with HT/HN 30" x 2                                                                                                                                                                           Modalities

## 2020-03-09 ENCOUNTER — OFFICE VISIT (OUTPATIENT)
Dept: OCCUPATIONAL THERAPY | Facility: CLINIC | Age: 35
End: 2020-03-09
Payer: MEDICARE

## 2020-03-09 ENCOUNTER — APPOINTMENT (OUTPATIENT)
Dept: PHYSICAL THERAPY | Facility: CLINIC | Age: 35
End: 2020-03-09
Payer: MEDICARE

## 2020-03-09 ENCOUNTER — OFFICE VISIT (OUTPATIENT)
Dept: NEUROLOGY | Facility: CLINIC | Age: 35
End: 2020-03-09
Payer: MEDICARE

## 2020-03-09 VITALS
WEIGHT: 233 LBS | HEIGHT: 71 IN | DIASTOLIC BLOOD PRESSURE: 81 MMHG | BODY MASS INDEX: 32.62 KG/M2 | SYSTOLIC BLOOD PRESSURE: 161 MMHG | HEART RATE: 75 BPM

## 2020-03-09 DIAGNOSIS — R56.9 PROVOKED SEIZURE (HCC): Primary | ICD-10-CM

## 2020-03-09 DIAGNOSIS — R27.8 ASTERIXIS: ICD-10-CM

## 2020-03-09 DIAGNOSIS — M21.372 FOOT DROP, BILATERAL: ICD-10-CM

## 2020-03-09 DIAGNOSIS — R56.9 SEIZURE (HCC): Primary | ICD-10-CM

## 2020-03-09 DIAGNOSIS — M21.371 FOOT DROP, BILATERAL: ICD-10-CM

## 2020-03-09 PROBLEM — A41.9 SEPSIS (HCC): Status: RESOLVED | Noted: 2019-12-11 | Resolved: 2020-03-09

## 2020-03-09 PROBLEM — E11.40 DIABETIC NEUROPATHY (HCC): Status: ACTIVE | Noted: 2020-03-09

## 2020-03-09 PROCEDURE — 97535 SELF CARE MNGMENT TRAINING: CPT

## 2020-03-09 PROCEDURE — 3066F NEPHROPATHY DOC TX: CPT | Performed by: PSYCHIATRY & NEUROLOGY

## 2020-03-09 PROCEDURE — 1036F TOBACCO NON-USER: CPT | Performed by: PSYCHIATRY & NEUROLOGY

## 2020-03-09 PROCEDURE — 97150 GROUP THERAPEUTIC PROCEDURES: CPT

## 2020-03-09 PROCEDURE — 3044F HG A1C LEVEL LT 7.0%: CPT | Performed by: PSYCHIATRY & NEUROLOGY

## 2020-03-09 PROCEDURE — 99354 PR PROLONGED SVC OUTPATIENT SETTING 1ST HOUR: CPT | Performed by: PSYCHIATRY & NEUROLOGY

## 2020-03-09 PROCEDURE — 1111F DSCHRG MED/CURRENT MED MERGE: CPT | Performed by: PSYCHIATRY & NEUROLOGY

## 2020-03-09 PROCEDURE — 99215 OFFICE O/P EST HI 40 MIN: CPT | Performed by: PSYCHIATRY & NEUROLOGY

## 2020-03-09 RX ORDER — DIVALPROEX SODIUM 500 MG/1
500 TABLET, EXTENDED RELEASE ORAL 2 TIMES DAILY
Qty: 60 TABLET | Refills: 5 | Status: SHIPPED | OUTPATIENT
Start: 2020-03-09 | End: 2020-05-12 | Stop reason: SDUPTHER

## 2020-03-09 NOTE — PATIENT INSTRUCTIONS
It is possible that you had a provoked seizure; although there is no comment if you had a generalized tonic clonic seizure, it is suspected that you may have had a convulsion ("he was seizing")  This could have been provoked by extreme hypertension in combination with uremic encephalopathy  Your EEG study did not capture recurrent seizures when you were in the hospital   No clear risk factor for ongoing seizures, reasonable to wean off of divalproex  Plan:   Likely you had a provoked seizure in the setting of uremia and hypertensive encephalopathy  No clear history of cortical stroke on MRI brain study  No prior history of seizures or convulsions  Recommend decreasing and eventually weaning off of Valproic acid  - may decrease valproic acid 250mg capsule to 2 caps twice a day until no more capsules, refill for Divalproex ER 500mg tab one tab twice a day  - in 1 month check free valproic acid level (blood work)  - routine EEG study  - follow-up with DAMIEN in 2 months and Dr Natasha Bower in 4 months  If no seizure by the 2 month follow-up visit, will decrease divalproex ER to 500mg once a day  - if you have a convulsive seizure or period of confusion, altered awareness, delirium take him to the hospital for urgent evaluation of provoking encephalopathy or if he was having subclinical seizures

## 2020-03-09 NOTE — PROGRESS NOTES
Review of Systems    {LimROS-complete:06328}      Review of Systems   Constitutional: Negative  Negative for appetite change and fever  HENT: Negative  Negative for hearing loss, tinnitus, trouble swallowing and voice change  Eyes: Negative  Negative for photophobia and pain  Respiratory: Negative  Negative for shortness of breath  Cardiovascular: Negative  Negative for palpitations  Gastrointestinal: Positive for nausea  Negative for vomiting  Endocrine: Negative  Negative for cold intolerance and heat intolerance  Genitourinary: Negative  Negative for dysuria, frequency and urgency  Musculoskeletal: Negative  Negative for myalgias and neck pain  Skin: Negative  Negative for rash  Neurological: Positive for dizziness  Negative for tremors, seizures, syncope, facial asymmetry, speech difficulty, weakness, light-headedness, numbness and headaches  Balance problems, difficulty walking   Hematological: Negative  Does not bruise/bleed easily  Psychiatric/Behavioral: Positive for sleep disturbance  Negative for confusion and hallucinations  The patient is nervous/anxious

## 2020-03-09 NOTE — PHYSICAL THERAPY NOTE
PT D/C SUMMARY    Pt was on the ARC for a short period of time; therefore LTGs were not met  Pt's mom was present for the majority of sessions and demonstrated understanding of how to provide A pt  We discussed fall risk factors and deficits in strength, activity tolerance and righting reactions  We reviewed different scenarios (in the house vs outside, for example) and use of RW/SPC/HHA as needed to maintain safety/dec fall risk  Pt d/c home with family support at this time and cont therapy services  Pt was reporting nausea and not feeling well, with vomiting episodes, which contributed to him wanting to go home to manage his blood sugars independently

## 2020-03-09 NOTE — PROGRESS NOTES
Pt discharge home from [facility], and made good in Occupational Therapy  Pt progressed to [address] level for functional transfers, supervision-CGA level for ADL function with use of RW  Pt discharged home with family support  OT recommending use of BSC, tub transfer bench, along with use of grab bars in the shower as mentioned and pt/family received and/or purchased prior to d/c for home use  Family education was necessary prior to d/c and completed to maximize independence and safety with ADL/IADL function at home environment  Pt safe to d/c home at current level of function and continues to present with deficits of decreased strength and coordination, impaired balance, decreased endurance and comorbid factors  Recommending Supervision-CGA for ADLS and daily activities at time of D/C  Pt does require further OT needs with recommendations for OUTPATIENT OT at time of D/C to focus on self care, strength/endurance, coordination and  strength, functional mobility and household maintenance

## 2020-03-09 NOTE — PROGRESS NOTES
[facility]  Patient's Name: Elvia Amaro   Patient's : 1985   Visit Type: hospital follow-up  Referring MD / PCP:  Leah Vigil DO    Assessment:  Mr Elvia Amaro is a 37 y o  man who was having word finding and cognitive difficulty for a couple of days prior to him having a reported "seizure" (it was reported as he was "seizing") without clear semiology reported  He struggles with hypertension, diabetes, and chronic kidney disease on peritoneal dialysis  It is suspected that his seizure was triggered by hypertensive and uremic encephalopathy  His MRI brain study show extensive white matter disease that can be seen in patients with uncontrolled hypertension and other risk factors for vasculopathy  Because he presented with status epilepticus, he was started on divalproex  But with the theory that status epilepticus was triggered by hypertensive encephalopathy, his risk of unprovoked seizure is relatively less than 30% and that he should be weaned off of divalproex  We will monitor for recurrent seizures as we taper his dose of divalproex over the next few months  Another possibility of an underlying trigger for his seizure and hypertensive encephalopathy is the introduction of erythropoietin for his chronic anemia associated with chronic kidney disease  He has been on intermittent erythropoietin for the past year and a half without seizures; but now that he had a seizure, he will need to discuss with his nephrologist as to the frequency or dose of erythropoietin or more frequent monitoring of blood pressure and blood pressure control to reduce his risk of seizures  To determine his future risk for seizures, will request a 24 hours ambulatory EEG study    A normal EEG study or an EEG study without epileptiform discharges does not mean he has no risk; but the presence of epileptiform discharges may increase the suspicion that an underlying seizure disorder should be considered  I'll start with a routine EEG study, then then 24 hours ambulatory EEG study after he is off of valproic acid  Plan:   Likely you had a provoked seizure in the setting of uremia and hypertensive encephalopathy  No clear history of cortical stroke on MRI brain study  No prior history of seizures or convulsions  Recommend decreasing and eventually weaning off of Valproic acid  - may decrease valproic acid 250mg capsule to 2 caps twice a day until no more capsules, refill for Divalproex ER 500mg tab one tab twice a day  - in 1 month check free valproic acid level (blood work)  - routine EEG study  - follow-up with [facility] in 2 months and Dr Tiffanie Hare in 4 months  If no seizure by the 2 month follow-up visit, will decrease divalproex ER to 500mg once a day  - if you have a convulsive seizure or period of confusion, altered awareness, delirium take him to the hospital for urgent evaluation of provoking encephalopathy or if he was having subclinical seizures  Problem List Items Addressed This Visit        Other    Provoked seizure ([facility] [address] ) - Primary    Relevant Medications    divalproex sodium (DEPAKOTE ER) 500 mg 24 hr tablet    Other Relevant Orders    Valproic acid level, free    EEG Awake and asleep    Asterixis    Foot drop, bilateral          Chief Complaint:   Chief Complaint   Patient presents with    Seizures      HPI:      Norma Sims is a 37 y o  right handed male here for hospital follow-up evaluation of seizure  He was previously evaluated by [facility]  The following is from interviewing the patient and review of the available office/hospital notes  Intake History 3/9/2020  Initially, the patient went to the ED in [facility] on 2/20/2020 for two days of dizziness and word finding difficulty (seemingly not speaking and inattentive)    When neurology was assessing the patient on 2/21/2020, he was seizing (see note by Dr Roxie Cooper) and a rapid response was called for status epilepticus  Patient was subsequently given lorazepam 10mg along with midazolam 4mg, intubated, and loaded with 2 g of valproic acid  He was then transferred to [facility] for video EEG monitoring  There was no seizure on video EEG monitoring  The neurology consultant considered that a few right arm jerks and eye deviation was concerning for seizure (or in follow-up neurology note: he was poorly responsive, alternating deviation of gaze and muscle rigidity)  At the end, neurology thought that the seizure could have been due to hypertensive encephalopathy (when seen by Dr Arianna Trejo -778/; 2/21/2020 his BP was 198/110)  He was discharged on divalproex  MRI brain study did not find any new pathology  Video EEG study did not capture further seizures  He was previously followed by Dr Isabel Leo due to multiple white matter T2 hyperintensities along with an acute stroke in January 2018 that affected his abducens nucleus causing diplopia (included CSF work up to rule out MS)  He was seen by Dr Nino Elizondo for the extensive white matter changes on MRI brain imaging study; she did not believe that he had an underlying demyelinating syndrome  He has 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 and 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  (he was seen by Hematology at [facility] on 3/28/2019, the significance of MTHFR mutation is unclear, heterozygous prothrombin gene is more concerning, no family history of VTE and has a greater risk  There is a nephrology consultation follow-up note on 2/22/2020 that referred to the patient getting Epogen 47297 units weekly, and it was on hold when he had seizures  He was on Epogen for about a year and a half ago, it is given if his hemoglobin is less 10  He gets Epogen at his dialysis  Last blood work was 2/7/2020    He saw Dr Jessica Odonnell ([address]) on 1/17/2020 and goes to [facility] to evaluate his peritoneal dialysis and that is where he gets Epogen injection  It seems that he gets Epogen injection twice a month and last one was on 2/7/2020  Patient's history:  He is here with his parents  He was confused on the day he presented to the hospital   He was having a difficult time remembering facts  He was not feeling well for 2 days, he was lethargic, he seems like he was staring for a few seconds, not being able to say what he wanted to say (not able to get the right words out, could not describe how he was feeling)  There was no twitching or jerking activity  There is no prior history of convulsive seizure or childhood seizures  He never had difficulty with finding words in the past     He continues to have difficulty recalling what happened during the hospitalization  His blood pressure in the morning could be 180/100  He usually tries to aim for -160  His nephrologist at his dialysis center manages his blood pressure  If his systolilc blood pressure goes below 120, he gets dizzy (motion feeling or lightheadedness)  There has been no recurrent episode of word finding difficulty  During dialysis (he does his own night time peritoneal dialysis), he notices that his hands have tremors or twitching that makes it difficult for him to control his fine motor skill, including difficulty controlling his fingers for utensils and writing  He has difficulty controlling his hands to  pills  The patient denies any history of myoclonus, staring spells, automatisms, unexplained hyperkinetic behaviors, olfactory / gustatory hallucinations, epigastric rising events, rodolfo vu events, visual hallucinations, unexplained nocturnal enuresis, or nocturnal tongue biting      AED/side effects/compliance:  Valproic acid 750-750    Event/Seizure semiology:  1  "seizing" in the setting of hypertensive encephalopathy - 2/21/2020    Prior Epilepsy History:  x    Special Features  Status epilepticus: No  Self Injury Seizures: No  Precipitating Factors: None    Epilepsy Risk Factors:  Abnormal pregnancy: No  Abnormal birth/: No  Abnormal Development: No  Febrile seizures, simple: No  Febrile seizures, complex: No  CNS infection: No  Mental retardation: No  Cerebral palsy: No  Head injury (moderate/severe): No  CNS neoplasm: No  CNS malformation: No  Neurosurgical procedure: No  Stroke: h/o of 2 strokes; first stroke - loss of balance/walking; second stroke - caused double vision, he needed strabismus surgery to help (MRI brain 2018 - left facial droop, diffusion restriction in the posterior aspect of the velia near the facial colliculus (involving the abducens nucleus))  Alcohol abuse: No  Drug abuse: No  Family history Sz/epilepsy: No    Prior AEDs:  medication Max dose Time used Reason to stop                 Prior workup:  I have reviewed the MRI brain study myself  Imagin2020  MRI brain w/o  Chronic lacunar infarcts in the left centrum semiovale and left thalamus  Previously demonstrated punctate infarct in the posterior velia is no longer visualized  Periventricular/subcortical T2 hyperintensities  Stable enlargement of the ventricles and sulci, consistent with volume loss     2018  MRI brain  Scattered T2 hyperintensities including in the velia, bilateral middle cerebellar peduncle, inferior cerebellar peduncle, right medulla, right inferior cerebellar peduncle, left frontal region  2018  MRI brain  Small focus of subacute infarct along the posterior aspect of the velia near the facial colliculus (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)  White matter signal changes    EEGs:  -2020  LTM - initially the study was started when he was intubated  There was 9-10 Hz alpha activity with diffuse polymorphic delta activity, overtime the background improved with alpha, theta, and beta activity    There were no epileptiform discharges  Events nonepileptic - appearing to be stretching and intermittent word finding difficulty (difficulty with speech)    Labs:  Component      Latest Ref Rng & Units 2/20/2020 2/21/2020 2/22/2020   Sodium      136 - 145 mmol/L 143 143    Potassium      3 5 - 5 3 mmol/L 5 6 (H) 4 6    Chloride      100 - 108 mmol/L 101 101    CO2      21 - 32 mmol/L 29 27    Anion Gap      4 - 13 mmol/L 13 15 (H)    BUN      5 - 25 mg/dL 53 (H) 52 (H)    Creatinine      0 60 - 1 30 mg/dL 15 32 (H) 15 44 (H)    Glucose, Random      65 - 140 mg/dL 160 (H) 212 (H)    Calcium      8 3 - 10 1 mg/dL 8 7 9 0    AST      5 - 45 U/L 20     ALT      12 - 78 U/L 22     Alkaline Phosphatase      46 - 116 U/L 77     Total Protein      6 4 - 8 2 g/dL 6 4     Albumin      3 5 - 5 0 g/dL 3 3 (L)     TOTAL BILIRUBIN      0 20 - 1 00 mg/dL 0 43     eGFR      ml/min/1 73sq m 4 4    WBC      4 31 - 10 16 Thousand/uL  9 21    Red Blood Cell Count      3 88 - 5 62 Million/uL  3 54 (L)    Hemoglobin      12 0 - 17 0 g/dL  11 1 (L)    HCT      36 5 - 49 3 %  34 1 (L)    Platelet Count      616 - 390 Thousands/uL  271    Cholesterol      50 - 200 mg/dL  128    Triglycerides      <=150 mg/dL  93    HDL      >=40 mg/dL  38 (L)    LDL Direct      0 - 100 mg/dL  71    Hemoglobin A1C      Normal 3 8-5 6%; PreDiabetic 5 7-6 4%;  Diabetic >=6 5%; Glycemic control for adults with diabetes <7 0% %  5 6    eAG, EST AVG Glucose      mg/dl  114    VALPROIC ACID TOTAL      50 - 100 ug/mL  36 (L) 37 (L)       General exam   /81 (BP Location: Right arm, Patient Position: Sitting, Cuff Size: Large)   Pulse 75   Ht 5' 11" (1 803 m)   Wt 106 kg (233 lb)   BMI 32 50 kg/m²    Appearance: normally developed, appears well  Carotids: no bruits present  Cardiovascular: regular rate and rhythm: 6-3/0 systolic murmur on the left upper sternal border  Pulmonary: clear to auscultation      HEENT: anicteric and moist mucus membranes / oral cavity   Fundoscopy: optic discs are yellow and sharp    Mental status  Orientation: alert and oriented to name, place, time  [employer] [facility]: intact   Attention and Concentration: able to spell HOUSE forwards and backwards  Current and Remote Memory:recalled 2/3 words after five minutes  Language: spontaneous speech is normal, comprehension is intact and naming is intact    Cranial Nerves  CN 1: not tested  CN 2: left eye full visual fields, right eye there seems to be a visual deficit over the right temporal upper quadrant   CN 3, 4, 6: EOMI, no nystagmus; he denies double vision looking to the left but there is variable double vision in other gaze positions including primary gaze, maximal double vision when looking to the lateral right  CN 5:sensation intact to all distribution V1, V2, V3  CN 7:muscles of facial expression are symmetric  CN 8:symmetric to finger rubs bilaterally  CN 9, 10:no dysarthria present and symmetric elevation of soft palate and uvula is midline  CN 11:symmetric strength of sternocleidomastoid and trapezius muscles  CN 12:tongue is midline    Motor:  Bulk, Tone: normal bulk, normal tone  Pronation: no pronator drift  Strength: Patient has full strength symmetrically of shoulder abduction, biceps, triceps, wrist flexion, wrist extension, finger flexion, finger abduction, hip flexion, knee flexion, knee extension, plantar flexion    He has 3+/5 bilateral dorsiflexion of his feet  Abnormal movements: there is asterixis with hands held up and hyperextended at the wrist     Sensory:  Vibration: intact at the metacarpal joints, variable at the finger tips, variable at the knees, unable to sense at the ankles and toes  Pinprick: intact at the hands and finger tips, intact at the lower leg and ankles, loss of pinprick sensation just after the ankle joint and over the dorsum of the feet and toes  Romberg:wobbling after eye closure but does not fall    Coordination:  FNF:FNF bilaterally intact  VERA:slow finger taps on the left and slow finger taps on the right  FFM:intact  Gait/Station:walks with a cane with bilateral foot drops    Reflexes:  bilateral toes were down going  DTRs bilateral  Triceps 2+/4  Brachiradialis / Biceps 1+/4  Knees 0/4  Ankles 0/4    Past Medical/Surgical History:  Patient Active Problem List   Diagnosis    Type 1 diabetes mellitus with hypoglycemia (HCC)    History of lacunar cerebrovascular accident (CVA)    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)    End-stage renal disease on peritoneal dialysis ([facility])    Persistent proteinuria    Bilateral leg edema    Ataxia    Left atrial dilation    Renovascular hypertension    Anemia    Heterozygous for prothrombin [id number] mutation ([facility] [address] )    [facility]    Dyslipidemia    Strabismus    Diarrhea    Current moderate episode of major depressive disorder without prior episode ([facility] [address] )    Environmental and seasonal allergies    Pruritus    Obesity (BMI 30 0-34  9)    Diabetic gastroparesis ([facility])    Peripheral polyneuropathy    Elevated TSH    Incidental lung nodule, > 3mm and < 8mm    Vertigo    Impaired mobility and ADLs    Left arm pain    Diabetic neuropathy ([facility])    Provoked seizure ([facility])    Asterixis    Foot drop, bilateral    Scalp cyst     Past Medical History:   Diagnosis Date    Acute kidney injury ([facility] [address] )     Anxiety     Cerebellar stroke side undetermined 2015 2015,1/2018    Diabetes type 1, controlled ([facility] [address] )     IDDM    Diarrhea     Gastroparesis     History of shingles 2010    History of transfusion 02/2018    Hypertension     Muscle weakness     general unsteadiness    Retinopathy      Past Surgical History:   Procedure Laterality Date    CARDIAC LOOP RECORDER  05/2018    EGD      EYE SURGERY      PERITONEAL CATHETER INSERTION N/A 8/27/2018    Procedure: UNROOF PD CATHETER;  Surgeon: Timmy Dewitt DO;  [facility];  Service: General    ID ESOPHAGOGASTRODUODENOSCOPY TRANSORAL DIAGNOSTIC N/A 4/18/2019    Procedure: ESOPHAGOGASTRODUODENOSCOPY (EGD); Surgeon: Anjelica Tolliver MD;  Location: [facility];   Service: Gastroenterology    FL LAP INSERTION TUNNELED INTRAPERITONEAL CATHETER N/A 8/6/2018    Procedure: LAPAROSCOPIC PD CATHETER PLACEMENT;  Surgeon: Maricruz De La Torre DO;  [facility];  Service: General    TONSILLECTOMY         Past Psychiatric History:  Depression: No  Anxiety: No  Psychosis: No    Medications:    Current Outpatient Medications:     ADMELOG SOLOSTAR 100 units/mL injection pen, INJECT 10 UNITS WITH A SLIDING SCALE OF 1 UNIT FOR EVERY 25 OVER 150 SUBCUTANEOUSLY THREE TIMES DAILY WITH MEALS (Patient taking differently: 5 Units 3 (three) times a day with meals ), Disp: 3 mL, Rfl: 1    aspirin 81 mg chewable tablet, Chew 81 mg daily, Disp: , Rfl:     atorvastatin (LIPITOR) 40 mg tablet, Take 1 tablet (40 mg total) by mouth every evening (Patient taking differently: Take 40 mg by mouth daily at bedtime ), Disp: 90 tablet, Rfl: 1    b complex vitamins capsule, Take 1 capsule by mouth daily, Disp: , Rfl:     B-D ULTRAFINE III SHORT PEN 31G X 8 MM MISC, USE 1 PEN NEEDLE 8 TIMES DAILY, Disp: 100 each, Rfl: 47    calcium acetate (PHOSLO) 667 mg capsule, Take 1 capsule (667 mg total) by mouth 3 (three) times a day with meals, Disp: , Rfl: 0    Cholecalciferol (VITAMIN D) 2000 units CAPS, Take 1 capsule by mouth daily, Disp: , Rfl:     cinacalcet (SENSIPAR) 90 MG tablet, Take 90 mg by mouth daily, Disp: , Rfl: 11    cloNIDine (CATAPRES) 0 1 mg tablet, Take 0 1 mg by mouth 3 (three) times a day May add another 0 1 as needed, Disp: , Rfl:     doxazosin (CARDURA) 2 mg tablet, Take 1 tablet (2 mg total) by mouth daily at bedtime, Disp: 30 tablet, Rfl: 0    escitalopram (LEXAPRO) 10 mg tablet, Take 1 tablet (10 mg total) by mouth daily, Disp: 90 tablet, Rfl: 2    famotidine (PEPCID) 20 mg tablet, Take 1 tablet (20 mg total) by mouth daily at bedtime, Disp: 30 tablet, Rfl: 5    gabapentin (NEURONTIN) 100 mg capsule, Take 100 mg by mouth daily at bedtime, Disp: , Rfl: 1    gentamicin (GARAMYCIN) 0 1 % cream, Apply 1 application topically daily , Disp: , Rfl:     GLUCAGON EMERGENCY 1 MG injection, INJECT 1MG SUBCUTANEOUSLY AS NEEDED (AS DIRECTED)   MAY REPEAT DOSE EVERY 20 MINUTES AS NEEDED, Disp: , Rfl: 3    hydrALAZINE (APRESOLINE) 100 MG tablet, Take 0 5 tablets (50 mg total) by mouth 3 (three) times a day, Disp: , Rfl:     hydrOXYzine HCL (ATARAX) 10 mg tablet, Take 1 tablet (10 mg total) by mouth every 6 (six) hours as needed for itching (Patient taking differently: Take 10 mg by mouth ), Disp: 120 tablet, Rfl: 0    insulin glargine (BASAGLAR KWIKPEN) 100 units/mL injection pen, Inject 12u in AM and 15u in PM (Patient taking differently: 10 Units every 12 (twelve) hours ), Disp: 5 pen, Rfl: 0    labetalol (NORMODYNE) 300 mg tablet, Take 1 tablet (300 mg total) by mouth every 12 (twelve) hours, Disp: 60 tablet, Rfl: 0    lisinopril (ZESTRIL) 40 mg tablet, Take 40 mg by mouth daily, Disp: , Rfl:     NIFEdipine ER (ADALAT CC) 60 MG 24 hr tablet, Take 1 tablet (60 mg total) by mouth 2 (two) times a day, Disp: 180 tablet, Rfl: 0    ondansetron (ZOFRAN) 4 mg tablet, Take 1 tablet (4 mg total) by mouth every 8 (eight) hours as needed for nausea or vomiting (Patient taking differently: Take 4 mg by mouth every 8 (eight) hours as needed for nausea or vomiting ), Disp: 30 tablet, Rfl: 0    Probiotic Product (PROBIOTIC DAILY) CAPS, Take 1 capsule by mouth daily at bedtime , Disp: , Rfl:     Sucroferric Oxyhydroxide (VELPHORO PO), 500 mg 3 (three) times a day with meals , Disp: , Rfl:     torsemide (DEMADEX) 100 mg tablet, Take 100 mg by mouth daily with lunch , Disp: , Rfl: 11    divalproex sodium (DEPAKOTE ER) 500 mg 24 hr tablet, Take 1 tablet (500 mg total) by mouth 2 (two) times a day, Disp: 60 tablet, Rfl: 5    Allergies: Allergies   Allergen Reactions    Sulfa Antibiotics Rash       Family history:  Family History   Problem Relation Age of Onset   Jose Luis Chao Breast cancer Mother     Hypertension Mother     Hyperlipidemia Father     Hypertension Father     Leukemia Maternal Grandmother     Hyperlipidemia Maternal Grandfather     Hypertension Maternal Grandfather     Hyperlipidemia Paternal Grandmother     Hypertension Paternal Grandmother     Heart disease Paternal Grandfather         cardiac disorder    Diabetes Paternal Grandfather      There is no family history of seizure, epilepsy or developmental delay  Social History  Living situation:  Lives with parents  Work:  Completed some college, disabled from medical condition  Driving:  Last driving a car was 2 years ago (unable to see correctly)   reports that he quit smoking about 2 years ago  His smoking use included cigarettes  He has a 6 00 pack-year smoking history  He has never used smokeless tobacco  He reports that he drank alcohol  He reports that he has current or past drug history  Drug: Marijuana  Frequency: 5 00 times per week  Review of Systems  A review of at least 12 organ/systems was obtained by the medical assistant and reviewed by me, including additional positives/negatives:  Gastrointestinal: Positive for nausea  Negative for vomiting  Neurological: Positive for dizziness  Negative for tremors, seizures, syncope, facial asymmetry, speech difficulty, weakness, light-headedness, numbness and headaches  Balance problems, difficulty walking   Psychiatric/Behavioral: Positive for sleep disturbance  Negative for confusion and hallucinations  The patient is nervous/anxious  Decision making was of high-complexity due to the patient's high risk condition (seizures), psychiatric and neuropsychological comorbidities, behavioral problems, memory and cognitive problems and medication side effects        Prolonged service  I spent an additional 39 minutes to address issues of management of epilepsy/loss of consciousness, psychiatric comorbidities, medication instructions, and monitoring (adverse effects, side effects, and risk of antiepileptic medications)    Started at 2:30PM   Cynthia Mclaughlin at 3:09PM

## 2020-03-09 NOTE — PROGRESS NOTES
Daily Note     Today's date: 3/9/2020  Patient name: Helene Hurtado  : 1985  MRN: [id number]  Referring provider: Michelle Jackson MD  Dx:   Encounter Diagnosis   Name Primary?  Seizure ([facility] [address] ) Yes                  Subjective: "My arms get tired "      Objective: See treatment below  Patient arrived late to session due to schedule conflict with other appointments  Initiated session on UEB for 3 mins prograde and 3 mins retrograde to increase UB strength and endurance for engagement in life roles  Patient completed B/L UE exercises seated utilizing red weighted ball 3x10 reps to increase UB strength and endurance for engagement in life roles  Therapist performed STM in hands with vibration to improve motor and sensory performance  Patient's questionnaire determined that IASTM cannot be performed  Utilized BITs to improve saccades and convergence for improved reading tasks with minimal symptoms  Assessment: Tolerated treatment well  Patient required rest breaks due to poor endurance during UB strengthening  Plan: Continued skilled OT per POC      INTERVENTION COMMENTS:  Precautions: fall risk, DM, seizures, NO IASTM  3 of 10 visits, PN due

## 2020-03-11 ENCOUNTER — TELEPHONE (OUTPATIENT)
Dept: NEUROLOGY | Facility: CLINIC | Age: 35
End: 2020-03-11

## 2020-03-11 ENCOUNTER — OFFICE VISIT (OUTPATIENT)
Dept: PHYSICAL THERAPY | Facility: CLINIC | Age: 35
End: 2020-03-11
Payer: MEDICARE

## 2020-03-11 ENCOUNTER — OFFICE VISIT (OUTPATIENT)
Dept: OCCUPATIONAL THERAPY | Facility: CLINIC | Age: 35
End: 2020-03-11
Payer: MEDICARE

## 2020-03-11 DIAGNOSIS — R56.9 SEIZURES (HCC): ICD-10-CM

## 2020-03-11 DIAGNOSIS — R56.9 SEIZURE (HCC): Primary | ICD-10-CM

## 2020-03-11 DIAGNOSIS — R26.9 GAIT ABNORMALITY: Primary | ICD-10-CM

## 2020-03-11 PROCEDURE — 97116 GAIT TRAINING THERAPY: CPT | Performed by: PHYSICAL THERAPIST

## 2020-03-11 PROCEDURE — 97110 THERAPEUTIC EXERCISES: CPT

## 2020-03-11 PROCEDURE — 97530 THERAPEUTIC ACTIVITIES: CPT

## 2020-03-11 PROCEDURE — 97530 THERAPEUTIC ACTIVITIES: CPT | Performed by: PHYSICAL THERAPIST

## 2020-03-11 PROCEDURE — 97112 NEUROMUSCULAR REEDUCATION: CPT | Performed by: PHYSICAL THERAPIST

## 2020-03-11 NOTE — TELEPHONE ENCOUNTER
Patient's mother stated she forgot that the patient had a 48 hour EEG inpatient on 2/21  Routine EEG was ordered at office visit on 3/9  Patient's mother is questioning if patient needs to have this done  Please advise  Okay to leave message     Minesh Wade [phone]

## 2020-03-11 NOTE — PROGRESS NOTES
Daily Note     Today's date: 3/11/2020  Patient name: Kiersten Alcazar  : 1985  MRN: [id number]  Referring provider: Wanda Cole MD  Dx:   Encounter Diagnosis   Name Primary?  Seizure ([address] ) Yes                   Subjective: "I'm just really fatigued from the seizure a few weeks ago "      Objective: See treatment below  Initiated session with 3 minutes prograde and 3 minutes retrograde on UBE at 1 5 resistance to increase endurance and proximal strengthening for functional tasks  Completed velcro board (whole hand grasp and lateral ) and get-a- pinch (pincer and 3-jaw-federico) clips for  and pinch strengthening for daily tasks  Spot It in vertical plane and embedded word search in horizontal place for increasing accurate saccades for reading tasks  Assessment: Tolerated treatment well  Required rest breaks with velcro board due to muscular fatigue  Nausea noted after get-a- activity     Plan: Continued skilled OT per POC      INTERVENTION COMMENTS:  Precautions: fall risk, DM, seizures, NO IASTM  4 of 10 visits, PN due

## 2020-03-11 NOTE — PROGRESS NOTES
Daily Note     Today's date: 3/11/2020  Patient name: Concepcion Negro  : 1985  MRN: [id number]  Referring provider: Fallon Everett MD  Dx:   Encounter Diagnosis     ICD-10-CM    1  Gait abnormality R26 9    2  Seizures ([address] ) R56 9                   Subjective: No complaints  Saw neurology yesterday with some changes in his seizure meds  Wants to be able to go down into basement to       Objective: See treatment diary below      Assessment: Able to increase gait speed on TM  With BUE pt safe to complete TM at home, discussed making sure his mom was near him first few times he went on TM  Stair training also completed to assess pt's safety, pt able to complete 15 steps with supervision for safety  Advised to have mom with him when he goes up and down stairs into his basement  Continues with difficulty with EC balance requiring UE support and occasional assist from PT  Patient would benefit from continued PT      Plan: Attempt TM with 1 UE NV challenging balance deficits  Precautons: DMI, seizures, hx CVAs   HTN  Short Term Goal Expiration Date:(4/3/20)  Long Term Goal Expiration Date: (20)  POC Expiration Date: (20)        Manual                                                                                          Exercise Diary   3/6  3/11         TM  1 5 mph, 10 min   1 5-1 8 mph BUE x10 min         Step-ups R & L  fw 10x  8" FWD  2x15         Semitandem     firm EC  4x30s          Tandem gait  2 laps  // bars           Side-stepping  OTB 3 laps  GTB 3 laps         BWD walking  3 laps // bars  in gonzalez  2x40' CGA/Iveth          FAEO foam 30" x 3  FA EC foam  3x30s          Alt step tap 6" 20x            FTEO with HT/HN 30" x 2            Stairs   Outside  x15 steps with 1 HR                                                                                                                                                           Modalities

## 2020-03-11 NOTE — PROGRESS NOTES
Assessment/Plan:    Problem List Items Addressed This Visit        Cardiovascular and Mediastinum    Renovascular hypertension     Uncontrolled in AM   Recommend increase dose of doxazosin but will refer to Nephrology            Musculoskeletal and Integument    Scalp cyst - Primary     Recommend warm compresses multiple times per day, if it does not drain, may need I&D            Other    Provoked seizure ([address] )     Secondary to uncontrolled HTN  He was placed on depakote by Neurology with likely discontinuation in the future               Subjective:      Patient ID: Elvia Amaro is a 37 y o  male  HPI  TCM Call (since 2/10/2020)     Date and time call was made  3/4/2020  1:24 [address] reviewed  Records reviewed    Patient was hospitialized at  [facility]    Date of Admission  02/28/20    Date of discharge  03/03/20    Diagnosis  Impaired mobility and ADLs    Disposition  Home    Current Symptoms  None      TCM Call (since 2/10/2020)     Scheduled for follow up? Yes    I have advised the patient to call PCP with any new or worsening symptoms  Kwadwo Juarez CMA    Comments  Patient appt scheduled for 3/12/20        44yo male with DM1 with ESRD on PD, gastroparesis, HLD, HTN, MDD, vitamin D def with h/o CVA here for OZ  He is accompanied by his mother  He was hospitalized at [facility] 2/21-2/28/20 for status epilepticus requiring intubation  Video EEG showed no epileptiform activity and was thought to be due to uncontrolled HTN with recommendation that SBP <160  He was started on depakote  His mother reports plan is to taper off of it  Medication adjustments were made with his BP meds by Nephrology and insulin per Endocrinology  He was seen by GI due to n/v which was likely due to gastroparesis    He was started on erythrommycin and zofran prn and was recommended to switch to PPI  He had noted L elbow swelling with negative xray, this was thought to be from trauma during his fall while having a seizure  He was transferred to inpatient rehab from 2/28-3/3/20  He is currently in outpatient rehab, he continues to feel weak  Has decreased strength and stamina  Doxazosin 2mg qHS was added, his home SBP in the morning >200  He has been a bit more dizzy  Every evening after dinner he has not needed to take his dinner dose of Admelog due to hypoglycemia in 50s  He has had nausea in the morning and takes zofran prn, he has remained on famotidine  He has had pruritis, scratching his arms, chest and noticed lesion behind his L ear  No drainage but is tender      The following portions of the patient's history were reviewed and updated as appropriate: allergies, current medications, past family history, past medical history, past social history, past surgical history and problem list       Current Outpatient Medications:     ADMELOG SOLOSTAR 100 units/mL injection pen, INJECT 10 UNITS WITH A SLIDING SCALE OF 1 UNIT FOR EVERY 25 OVER 150 SUBCUTANEOUSLY THREE TIMES DAILY WITH MEALS (Patient taking differently: 5 Units 3 (three) times a day with meals ), Disp: 3 mL, Rfl: 1    aspirin 81 mg chewable tablet, Chew 81 mg daily, Disp: , Rfl:     atorvastatin (LIPITOR) 40 mg tablet, Take 1 tablet (40 mg total) by mouth every evening (Patient taking differently: Take 40 mg by mouth daily at bedtime ), Disp: 90 tablet, Rfl: 1    b complex vitamins capsule, Take 1 capsule by mouth daily, Disp: , Rfl:     B-D ULTRAFINE III SHORT PEN 31G X 8 MM MISC, USE 1 PEN NEEDLE 8 TIMES DAILY, Disp: 100 each, Rfl: 47    calcium acetate (PHOSLO) 667 mg capsule, Take 1 capsule (667 mg total) by mouth 3 (three) times a day with meals, Disp: , Rfl: 0    Cholecalciferol (VITAMIN D) 2000 units CAPS, Take 1 capsule by mouth daily, Disp: , Rfl:     cinacalcet (SENSIPAR) 90 MG tablet, Take 90 mg by mouth daily, Disp: , Rfl: 11    cloNIDine (CATAPRES) 0 1 mg tablet, Take 0 1 mg by mouth 3 (three) times a day May add another 0 1 as needed, Disp: , Rfl:     divalproex sodium (DEPAKOTE ER) 500 mg 24 hr tablet, Take 1 tablet (500 mg total) by mouth 2 (two) times a day, Disp: 60 tablet, Rfl: 5    doxazosin (CARDURA) 2 mg tablet, Take 1 tablet (2 mg total) by mouth daily at bedtime, Disp: 30 tablet, Rfl: 0    escitalopram (LEXAPRO) 10 mg tablet, Take 1 tablet (10 mg total) by mouth daily, Disp: 90 tablet, Rfl: 2    famotidine (PEPCID) 20 mg tablet, Take 1 tablet (20 mg total) by mouth daily at bedtime, Disp: 30 tablet, Rfl: 5    gabapentin (NEURONTIN) 100 mg capsule, Take 100 mg by mouth daily at bedtime, Disp: , Rfl: 1    gentamicin (GARAMYCIN) 0 1 % cream, Apply 1 application topically daily , Disp: , Rfl:     GLUCAGON EMERGENCY 1 MG injection, INJECT 1MG SUBCUTANEOUSLY AS NEEDED (AS DIRECTED)   MAY REPEAT DOSE EVERY 20 MINUTES AS NEEDED, Disp: , Rfl: 3    hydrALAZINE (APRESOLINE) 100 MG tablet, Take 0 5 tablets (50 mg total) by mouth 3 (three) times a day, Disp: , Rfl:     hydrOXYzine HCL (ATARAX) 10 mg tablet, Take 1 tablet (10 mg total) by mouth every 6 (six) hours as needed for itching (Patient taking differently: Take 10 mg by mouth ), Disp: 120 tablet, Rfl: 0    insulin glargine (BASAGLAR KWIKPEN) 100 units/mL injection pen, Inject 12u in AM and 15u in PM (Patient taking differently: 10 Units every 12 (twelve) hours ), Disp: 5 pen, Rfl: 0    labetalol (NORMODYNE) 300 mg tablet, Take 1 tablet (300 mg total) by mouth every 12 (twelve) hours, Disp: 60 tablet, Rfl: 0    lisinopril (ZESTRIL) 40 mg tablet, Take 40 mg by mouth daily, Disp: , Rfl:     NIFEdipine ER (ADALAT CC) 60 MG 24 hr tablet, Take 1 tablet (60 mg total) by mouth 2 (two) times a day, Disp: 180 tablet, Rfl: 0    ondansetron (ZOFRAN) 4 mg tablet, Take 1 tablet (4 mg total) by mouth every 8 (eight) hours as needed for nausea or vomiting (Patient taking differently: Take 4 mg by mouth every 8 (eight) hours as needed for nausea or vomiting ), Disp: 30 tablet, Rfl: 0    Probiotic Product (PROBIOTIC DAILY) CAPS, Take 1 capsule by mouth daily at bedtime , Disp: , Rfl:     Sucroferric Oxyhydroxide (VELPHORO PO), 500 mg 3 (three) times a day with meals , Disp: , Rfl:     torsemide (DEMADEX) 100 mg tablet, Take 100 mg by mouth daily with lunch , Disp: , Rfl: 11    Review of Systems   Constitutional: Positive for fatigue  Negative for fever  +weight gain   Respiratory: Negative for cough, shortness of breath, wheezing and stridor  Cardiovascular: Negative for chest pain, palpitations and leg swelling  Gastrointestinal: Positive for nausea  Negative for abdominal pain, constipation, diarrhea and vomiting  Musculoskeletal: Positive for gait problem  Skin: Positive for color change, rash and wound  Negative for pallor  pruritis   Neurological: Positive for dizziness, seizures and weakness  Negative for headaches  Objective:    /70 (BP Location: Right arm, Patient Position: Sitting)   Pulse 79   Temp 97 8 °F (36 6 °C)   Resp 16   Ht 5' 11" (1 803 m)   Wt 109 kg (239 lb 9 6 oz)   SpO2 97%   BMI 33 42 kg/m²      Physical Exam   Constitutional: He appears well-developed and well-nourished  No distress  HENT:   Head: Normocephalic  Nose: Nose normal    Mouth/Throat: Oropharynx is clear and moist  No oropharyngeal exudate  Neck: Neck supple  Cardiovascular: Normal rate, regular rhythm and normal heart sounds  Trace pedal edema BL   Pulmonary/Chest: Effort normal and breath sounds normal  No stridor  No respiratory distress  Neurological: He is alert  Skin: Skin is dry  He is not diaphoretic  1 5cm cyst L temporal area, tender on palpation, fluctuant   Psychiatric: He has a normal mood and affect  His behavior is normal    Vitals reviewed

## 2020-03-11 NOTE — TELEPHONE ENCOUNTER
Yes, I need a routine EEG when he is well (what his brain normally does)  The 48 hours EEG study was performed when he was unwell including being intubated and sedated

## 2020-03-12 ENCOUNTER — OFFICE VISIT (OUTPATIENT)
Dept: INTERNAL MEDICINE CLINIC | Facility: CLINIC | Age: 35
End: 2020-03-12
Payer: MEDICARE

## 2020-03-12 ENCOUNTER — TELEPHONE (OUTPATIENT)
Dept: NEUROLOGY | Facility: CLINIC | Age: 35
End: 2020-03-12

## 2020-03-12 VITALS
WEIGHT: 239.6 LBS | RESPIRATION RATE: 16 BRPM | HEIGHT: 71 IN | DIASTOLIC BLOOD PRESSURE: 70 MMHG | TEMPERATURE: 97.8 F | SYSTOLIC BLOOD PRESSURE: 162 MMHG | BODY MASS INDEX: 33.54 KG/M2 | OXYGEN SATURATION: 97 % | HEART RATE: 79 BPM

## 2020-03-12 DIAGNOSIS — L72.9 SCALP CYST: Primary | ICD-10-CM

## 2020-03-12 DIAGNOSIS — R56.9 PROVOKED SEIZURE (HCC): ICD-10-CM

## 2020-03-12 DIAGNOSIS — I15.0 RENOVASCULAR HYPERTENSION: ICD-10-CM

## 2020-03-12 PROCEDURE — 99495 TRANSJ CARE MGMT MOD F2F 14D: CPT | Performed by: INTERNAL MEDICINE

## 2020-03-12 NOTE — ASSESSMENT & PLAN NOTE
Secondary to uncontrolled HTN    He was placed on depakote by Neurology with likely discontinuation in the future

## 2020-03-12 NOTE — TELEPHONE ENCOUNTER
Called patient and left message requesting a call back if patient is willing to travel to [facility] to put him on [place] wait list  Added that we are on the look out for any cancellations

## 2020-03-12 NOTE — TELEPHONE ENCOUNTER
----- Message from Helayne Hammans, MA sent at 3/12/2020  2:45 PM EDT -----  Could you call patient and ask  Thank you  All we have to do at this time is just keep an eye out for last minute cancellations       ----- Message -----  From: Caio Carrero  Sent: 3/12/2020   1:45 PM EDT  To: Helayne Hammans, MA Im sorry, Dr Jose Lindsay next appointment that is available is in June in [facility]  And I have 2 open slots in May 21st if patient is willing to travel to [facility]     ----- Message -----  From: Helayne Hammans, MA  Sent: 3/11/2020   4:03 PM EDT  To: Colleen Sahni,    I know you were looking to fit patients into Dr Breanne Carmona schedule  Not sure if you have any more available room  I have no where on Dr Emmanuel Beauchamp schedule to put this patient  Just wanted to reach out to see if you had anything     Valiant Samson  ----- Message -----  From: Esme Guardado RN  Sent: 3/4/2020  12:00 PM EDT  To: Helayne Hammans, MA    Genaro [facility]oss [employer]ies! Christina Esquivel requested patient see an attending epileptologist within the next few weeks  Established in our office with history of stroke  Discharged from [facility] yesterday following an admission for status  Is it at all possible to save an appointment time when rescheduling other patients on one of the doc's schedules? Christina Esquivel states to contact mother Frank Joya for appt at phone number [phone]  Thanks!

## 2020-03-16 ENCOUNTER — OFFICE VISIT (OUTPATIENT)
Dept: CARDIOLOGY CLINIC | Facility: CLINIC | Age: 35
End: 2020-03-16
Payer: MEDICARE

## 2020-03-16 VITALS
OXYGEN SATURATION: 97 % | DIASTOLIC BLOOD PRESSURE: 108 MMHG | BODY MASS INDEX: 32.9 KG/M2 | SYSTOLIC BLOOD PRESSURE: 162 MMHG | HEIGHT: 71 IN | WEIGHT: 235 LBS | HEART RATE: 64 BPM

## 2020-03-16 DIAGNOSIS — I15.0 RENOVASCULAR HYPERTENSION: Primary | ICD-10-CM

## 2020-03-16 DIAGNOSIS — Z86.73 HISTORY OF LACUNAR CEREBROVASCULAR ACCIDENT (CVA): Chronic | ICD-10-CM

## 2020-03-16 DIAGNOSIS — E78.5 DYSLIPIDEMIA: ICD-10-CM

## 2020-03-16 PROCEDURE — 3008F BODY MASS INDEX DOCD: CPT | Performed by: INTERNAL MEDICINE

## 2020-03-16 PROCEDURE — 1036F TOBACCO NON-USER: CPT | Performed by: INTERNAL MEDICINE

## 2020-03-16 PROCEDURE — 1111F DSCHRG MED/CURRENT MED MERGE: CPT | Performed by: INTERNAL MEDICINE

## 2020-03-16 PROCEDURE — 99213 OFFICE O/P EST LOW 20 MIN: CPT | Performed by: INTERNAL MEDICINE

## 2020-03-16 PROCEDURE — 3066F NEPHROPATHY DOC TX: CPT | Performed by: INTERNAL MEDICINE

## 2020-03-16 PROCEDURE — 3044F HG A1C LEVEL LT 7.0%: CPT | Performed by: INTERNAL MEDICINE

## 2020-03-16 NOTE — TELEPHONE ENCOUNTER
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

## 2020-03-16 NOTE — PROGRESS NOTES
Cardiology Follow Up    Gabe Mendez  1985  [id number]  [facility]  [address] [id number] [phone]    1  Renovascular hypertension     2  History of lacunar cerebrovascular accident (CVA)     3  Dyslipidemia           Discussion/Summary: All of his assessed cardiac problems are stable  I have reviewed his medications and made no changes  I will await to hear for the transplant team whether further cardiac ischemic evaluation is needed to be re listed ( cath versus nuclear stress testing )       Interval History: He has not had any cardiac problems since his last office visit  He remain on PD  He did have a recent seizure thought due to HTN  Nuclear stress test one year ago showed a normal EF with no ischemia  He is presently off of the transplant list for kidney / pancreas  His ILR has not showed any AF  SANJEEV 4/16/2018 - normal EF, no valve problems    He denies CP, SOB  Patient Active Problem List   Diagnosis    Type 1 diabetes mellitus with hypoglycemia ([facility] )    History of lacunar cerebrovascular accident (CVA)    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,      Binocular visual disturbance    Vitamin D deficiency    Homozygous MTHFR mutation [id number] ([facility] [facility] )    End-stage renal disease on peritoneal dialysis ([facility] [address] [facility] )    Persistent proteinuria    Bilateral leg edema    Ataxia    Left atrial dilation    Renovascular hypertension    Anemia    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 ([facility] [facility] )    Peritoneal dialysis catheter in place ([facility] )    Dyslipidemia    Strabismus    Diarrhea    Current moderate episode of major depressive disorder without prior episode ([facility] [address] )    Environmental and seasonal allergies    Pruritus    Obesity (BMI 30 0-34  9)    Diabetic gastroparesis (HCC)    Peripheral polyneuropathy    Elevated TSH    Incidental lung nodule, > 3mm and < 8mm    Vertigo    Impaired mobility and ADLs    Left arm pain    Diabetic neuropathy (HCC)    Provoked seizure (HCC)    Asterixis    Foot drop, bilateral    Scalp cyst     Past Medical History:   Diagnosis Date    Acute kidney injury ([facility] [address] )     Anxiety     Cerebellar stroke side undetermined 2015,2018    Diabetes type 1, controlled [facility]     IDDM    Diarrhea     Gastroparesis     History of shingles 2010    History of transfusion 2018    Hypertension     Muscle weakness     general unsteadiness    Retinopathy      Social History     Socioeconomic History    Marital status: Single     Spouse name: Not on file    Number of children: Not on file    Years of education: Not on file    Highest education level: Not on file   Occupational History    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   Social Needs    Financial resource strain: Not on file    Food insecurity:     Worry: Not on file     Inability: Not on file    Transportation needs:     Medical: Not on file     Non-medical: Not on file   Tobacco Use    Smoking status: Former Smoker     Packs/day: 0 50     Years: 12 00     Pack years: 6 00     Types: Cigarettes     Last attempt to quit: 2018     Years since quittin 1    Smokeless tobacco: Never Used    Tobacco comment: quit 2018   Substance and Sexual Activity    Alcohol use: Not Currently     Comment: rarely    Drug use: Yes     Frequency: 5 0 times per week     Types: Marijuana     Comment: medical    Sexual activity: Not on file   Lifestyle    Physical activity:     Days per week: Not on file     Minutes per session: Not on file    Stress: Not on file   Relationships    Social connections:     Talks on phone: Not on file     Gets together: Not on file     Attends Mosque service: Not on file     Active member of club or organization: Not on file     Attends meetings of clubs or organizations: Not on file     Relationship status: Not on file   Tavia Pulido Intimate partner violence:     Fear of current or ex partner: Not on file     Emotionally abused: Not on file     Physically abused: Not on file     Forced sexual activity: Not on file   Other Topics Concern    Not on file   Social History Narrative    Caffeine use    single      Family History   Problem Relation Age of Onset    Breast cancer Mother     Hypertension Mother     Hyperlipidemia Father     Hypertension Father     Leukemia Maternal Grandmother     Hyperlipidemia Maternal Grandfather     Hypertension Maternal Grandfather     Hyperlipidemia Paternal Grandmother     Hypertension Paternal Grandmother     Heart disease Paternal Grandfather         cardiac disorder    Diabetes Paternal Grandfather      Past Surgical History:   Procedure Laterality Date    CARDIAC LOOP RECORDER  05/2018    EGD      EYE SURGERY      PERITONEAL CATHETER INSERTION N/A 8/27/2018    Procedure: UNROOF PD CATHETER;  Surgeon: Radha Pierce DO;  [facility];  Service: General    CT ESOPHAGOGASTRODUODENOSCOPY TRANSORAL DIAGNOSTIC N/A 4/18/2019    Procedure: ESOPHAGOGASTRODUODENOSCOPY (EGD); Surgeon: Sharad Montana MD;  Location: [facility];   Service: Gastroenterology    CT LAP INSERTION TUNNELED INTRAPERITONEAL CATHETER N/A 8/6/2018    Procedure: LAPAROSCOPIC PD CATHETER PLACEMENT;  Surgeon: Radha Pierce DO;  [facility];  Service: General    TONSILLECTOMY         Current Outpatient Medications:     ADMELOG SOLOSTAR 100 units/mL injection pen, INJECT 10 UNITS WITH A SLIDING SCALE OF 1 UNIT FOR EVERY 25 OVER 150 SUBCUTANEOUSLY THREE TIMES DAILY WITH MEALS (Patient taking differently: 5 Units 3 (three) times a day with meals ), Disp: 3 mL, Rfl: 1    aspirin 81 mg chewable tablet, Chew 81 mg daily, Disp: , Rfl:     atorvastatin (LIPITOR) 40 mg tablet, Take 1 tablet (40 mg total) by mouth every evening (Patient taking differently: Take 40 mg by mouth daily at bedtime ), Disp: 90 tablet, Rfl: 1    b complex vitamins capsule, Take 1 capsule by mouth daily, Disp: , Rfl:     B-D ULTRAFINE III SHORT PEN 31G X 8 MM MISC, USE 1 PEN NEEDLE 8 TIMES DAILY, Disp: 100 each, Rfl: 47    calcium acetate (PHOSLO) 667 mg capsule, Take 1 capsule (667 mg total) by mouth 3 (three) times a day with meals, Disp: , Rfl: 0    Cholecalciferol (VITAMIN D) 2000 units CAPS, Take 1 capsule by mouth daily, Disp: , Rfl:     cinacalcet (SENSIPAR) 90 MG tablet, Take 90 mg by mouth daily, Disp: , Rfl: 11    cloNIDine (CATAPRES) 0 1 mg tablet, Take 0 1 mg by mouth 3 (three) times a day May add another 0 1 as needed, Disp: , Rfl:     divalproex sodium (DEPAKOTE ER) 500 mg 24 hr tablet, Take 1 tablet (500 mg total) by mouth 2 (two) times a day, Disp: 60 tablet, Rfl: 5    doxazosin (CARDURA) 2 mg tablet, Take 1 tablet (2 mg total) by mouth daily at bedtime, Disp: 30 tablet, Rfl: 0    escitalopram (LEXAPRO) 10 mg tablet, Take 1 tablet (10 mg total) by mouth daily, Disp: 90 tablet, Rfl: 2    famotidine (PEPCID) 20 mg tablet, Take 1 tablet (20 mg total) by mouth daily at bedtime, Disp: 30 tablet, Rfl: 5    gabapentin (NEURONTIN) 100 mg capsule, Take 100 mg by mouth daily at bedtime, Disp: , Rfl: 1    gentamicin (GARAMYCIN) 0 1 % cream, Apply 1 application topically daily , Disp: , Rfl:     GLUCAGON EMERGENCY 1 MG injection, INJECT 1MG SUBCUTANEOUSLY AS NEEDED (AS DIRECTED)   MAY REPEAT DOSE EVERY 20 MINUTES AS NEEDED, Disp: , Rfl: 3    hydrALAZINE (APRESOLINE) 100 MG tablet, Take 0 5 tablets (50 mg total) by mouth 3 (three) times a day, Disp: , Rfl:     hydrOXYzine HCL (ATARAX) 10 mg tablet, Take 1 tablet (10 mg total) by mouth every 6 (six) hours as needed for itching (Patient taking differently: Take 10 mg by mouth ), Disp: 120 tablet, Rfl: 0    insulin glargine (BASAGLAR KWIKPEN) 100 units/mL injection pen, Inject 12u in AM and 15u in PM (Patient taking differently: 10 Units every 12 (twelve) hours ), Disp: 5 pen, Rfl: 0   labetalol (NORMODYNE) 300 mg tablet, Take 1 tablet (300 mg total) by mouth every 12 (twelve) hours, Disp: 60 tablet, Rfl: 0    lisinopril (ZESTRIL) 40 mg tablet, Take 40 mg by mouth daily, Disp: , Rfl:     NIFEdipine ER (ADALAT CC) 60 MG 24 hr tablet, Take 1 tablet (60 mg total) by mouth 2 (two) times a day, Disp: 180 tablet, Rfl: 0    ondansetron (ZOFRAN) 4 mg tablet, Take 1 tablet (4 mg total) by mouth every 8 (eight) hours as needed for nausea or vomiting (Patient taking differently: Take 4 mg by mouth every 8 (eight) hours as needed for nausea or vomiting ), Disp: 30 tablet, Rfl: 0    Probiotic Product (PROBIOTIC DAILY) CAPS, Take 1 capsule by mouth daily at bedtime , Disp: , Rfl:     Sucroferric Oxyhydroxide (VELPHORO PO), 500 mg 3 (three) times a day with meals , Disp: , Rfl:     torsemide (DEMADEX) 100 mg tablet, Take 100 mg by mouth daily with lunch , Disp: , Rfl: 11  Allergies   Allergen Reactions    Sulfa Antibiotics Rash     Vitals:    03/16/20 1427   BP: (!) 162/108   BP Location: Right arm   Patient Position: Sitting   Cuff Size: Adult   Pulse: 64   SpO2: 97%   Weight: 107 kg (235 lb)   Height: 5' 11" (1 803 m)     Weight (last 2 days)     Date/Time   Weight    03/16/20 1427   107 (235)             Blood pressure (!) 162/108, pulse 64, height 5' 11" (1 803 m), weight 107 kg (235 lb), SpO2 97 %  , Body mass index is 32 78 kg/m²      Labs:  Admission on 02/28/2020, Discharged on 03/03/2020   Component Date Value    POC Glucose 02/28/2020 133     POC Glucose 02/28/2020 126     POC Glucose 02/29/2020 242*    POC Glucose 02/29/2020 306*    POC Glucose 02/29/2020 215*    POC Glucose 02/29/2020 75     POC Glucose 02/29/2020 77     POC Glucose 02/29/2020 235*    POC Glucose 03/01/2020 376*    POC Glucose 03/01/2020 337*    POC Glucose 03/01/2020 95     POC Glucose 03/01/2020 129     POC Glucose 03/01/2020 279*    POC Glucose 03/01/2020 343*    WBC 03/02/2020 6 36     RBC 03/02/2020 2 88*  Hemoglobin 03/02/2020 9 1*    Hematocrit 03/02/2020 27 2*    MCV 03/02/2020 94     MCH 03/02/2020 31 6     MCHC 03/02/2020 33 5     RDW 03/02/2020 13 7     Platelets 72/08/1927 327     MPV 03/02/2020 10 2     Albumin 03/02/2020 2 5*    Calcium 03/02/2020 8 7     Phosphorus 03/02/2020 5 6*    Glucose 03/02/2020 182*    BUN 03/02/2020 61*    Creatinine 03/02/2020 13 70*    Sodium 03/02/2020 132*    Potassium 03/02/2020 4 0     Chloride 03/02/2020 94*    CO2 03/02/2020 25     ANION GAP 03/02/2020 13     eGFR 03/02/2020 4     Ferritin 03/02/2020 766*    Iron Saturation 03/02/2020 84     TIBC 03/02/2020 162*    Iron 03/02/2020 136     POC Glucose 03/02/2020 190*    POC Glucose 03/02/2020 296*    POC Glucose 03/02/2020 150*    POC Glucose 03/02/2020 214*    POC Glucose 03/03/2020 373*    Sodium 03/03/2020 133*    Potassium 03/03/2020 4 4     Chloride 03/03/2020 94*    CO2 03/03/2020 28     ANION GAP 03/03/2020 11     BUN 03/03/2020 59*    Creatinine 03/03/2020 13 70*    Glucose 03/03/2020 285*    Calcium 03/03/2020 9 0     eGFR 03/03/2020 4     POC Glucose 03/03/2020 290*    POC Glucose 03/03/2020 85    No results displayed because visit has over 200 results        Admission on 02/20/2020, Discharged on 02/21/2020   Component Date Value    WBC 02/20/2020 7 85     RBC 02/20/2020 3 34*    Hemoglobin 02/20/2020 10 4*    Hematocrit 02/20/2020 32 6*    MCV 02/20/2020 98     MCH 02/20/2020 31 1     MCHC 02/20/2020 31 9     RDW 02/20/2020 15 7*    MPV 02/20/2020 9 3     Platelets 82/30/3632 235     nRBC 02/20/2020 0     Neutrophils Relative 02/20/2020 60     Immat GRANS % 02/20/2020 0     Lymphocytes Relative 02/20/2020 20     Monocytes Relative 02/20/2020 8     Eosinophils Relative 02/20/2020 11*    Basophils Relative 02/20/2020 1     Neutrophils Absolute 02/20/2020 4 75     Immature Grans Absolute 02/20/2020 0 03     Lymphocytes Absolute 02/20/2020 1 57     Monocytes Absolute 02/20/2020 0 60     Eosinophils Absolute 02/20/2020 0 83*    Basophils Absolute 02/20/2020 0 07     Protime 02/20/2020 12 8     INR 02/20/2020 1 02     PTT 02/20/2020 33     Sodium 02/20/2020 143     Potassium 02/20/2020 5 6*    Chloride 02/20/2020 101     CO2 02/20/2020 29     ANION GAP 02/20/2020 13     BUN 02/20/2020 53*    Creatinine 02/20/2020 15 32*    Glucose 02/20/2020 160*    Calcium 02/20/2020 8 7     AST 02/20/2020 20     ALT 02/20/2020 22     Alkaline Phosphatase 02/20/2020 77     Total Protein 02/20/2020 6 4     Albumin 02/20/2020 3 3*    Total Bilirubin 02/20/2020 0 43     eGFR 02/20/2020 4     Magnesium 02/20/2020 2 5     POC Glucose 02/20/2020 171*    Sodium 02/21/2020 143     Potassium 02/21/2020 4 6     Chloride 02/21/2020 101     CO2 02/21/2020 27     ANION GAP 02/21/2020 15*    BUN 02/21/2020 52*    Creatinine 02/21/2020 15 44*    Glucose 02/21/2020 212*    Calcium 02/21/2020 9 0     eGFR 02/21/2020 4     WBC 02/21/2020 9 21     RBC 02/21/2020 3 54*    Hemoglobin 02/21/2020 11 1*    Hematocrit 02/21/2020 34 1*    MCV 02/21/2020 96     MCH 02/21/2020 31 4     MCHC 02/21/2020 32 6     RDW 02/21/2020 15 5*    Platelets 71/89/6053 271     MPV 02/21/2020 9 3     Hemoglobin A1C 02/21/2020 5 6     EAG 02/21/2020 114     Cholesterol 02/21/2020 128     Triglycerides 02/21/2020 93     HDL, Direct 02/21/2020 38*    LDL Calculated 02/21/2020 71     POC Glucose 02/21/2020 226*    LACTIC ACID 02/21/2020 5 1*    Sodium 02/21/2020 142     Potassium 02/21/2020 5 4*    Chloride 02/21/2020 100     CO2 02/21/2020 24     ANION GAP 02/21/2020 18*    BUN 02/21/2020 50*    Creatinine 02/21/2020 15 82*    Glucose 02/21/2020 255*    Calcium 02/21/2020 9 0     eGFR 02/21/2020 3     WBC 02/21/2020 7 82     RBC 02/21/2020 3 02*    Hemoglobin 02/21/2020 9 8*    Hematocrit 02/21/2020 29 0*    MCV 02/21/2020 96     MCH 02/21/2020 32 5     MCHC 02/21/2020 33 8     RDW 02/21/2020 15 6*    Platelets 59/89/3333 261     MPV 02/21/2020 9 8     Magnesium 02/21/2020 2 8*    Valproic Acid, Total 02/21/2020 71     pH, Art i-STAT 02/21/2020 7  358     pCO2, Art i-STAT 02/21/2020 41 3     pO2, ART i-STAT 02/21/2020 >400 0*    BE, i-STAT 02/21/2020 -2     HCO3, Art i-STAT 02/21/2020 23 2     CO2, i-STAT 02/21/2020 24     O2 Sat, i-STAT 02/21/2020      SODIUM, I-STAT 02/21/2020 138     Potassium, i-STAT 02/21/2020 5 0     Calcium, Ionized i-STAT 02/21/2020 1 10*    Hct, i-STAT 02/21/2020 30*    Hgb, i-STAT 02/21/2020 10 2*    Glucose, i-STAT 02/21/2020 275*    POC FIO2 02/21/2020 60     Specimen Type 02/21/2020 ARTERIAL     Delivery System 02/21/2020 AC     Respiratory Rate 02/21/2020 20     Vent Type 02/21/2020 TV     Vent Value 02/21/2020 500     Ancillary Values 02/21/2020 PEEPS     Pressure Setting 02/21/2020 05     SITE 02/21/2020 Left Radial     MOODY TEST 02/21/2020 003     Ventricular Rate 02/20/2020 85     Atrial Rate 02/20/2020 85     OR Interval 02/20/2020 144     QRSD Interval 02/20/2020 72     QT Interval 02/20/2020 382     QTC Interval 02/20/2020 454     P Axis 02/20/2020 65     QRS Axis 02/20/2020 62     T Wave Axis 02/20/2020 37    No results displayed because visit has over 200 results  Lab Requisition on 10/17/2019   Component Date Value    Magnesium 10/17/2019 2 6     Potassium 10/17/2019 5 0     Sodium 10/17/2019 139    Orders Only on 10/07/2019   Component Date Value    Right Eye Diabetic Retin* 10/07/2019 Proliferative     Left Eye Diabetic Retino* 10/07/2019 Proliferative      Imaging: Xr Chest Portable    Result Date: 2/21/2020  Narrative: CHEST INDICATION:   intubation- tube placement  COMPARISON:  Chest radiograph from 12/7/2019  EXAM PERFORMED/VIEWS:  XR CHEST PORTABLE FINDINGS:  ET tube 5 cm above the julia  Cardiomediastinal silhouette appears unremarkable   Loop recorder in the left anterior chest wall  The lungs are clear  No pneumothorax or pleural effusion  Osseous structures appear within normal limits for patient age  Impression: No acute cardiopulmonary disease  ET tube 5 cm above the julia  Workstation performed: [id number]     Xr Elbow 3+ Vw Left    Result Date: 2/26/2020  Narrative: LEFT ELBOW INDICATION:   Left elbow pain and swelling  COMPARISON:  None VIEWS:  XR ELBOW 3+ VW LEFT FINDINGS: There is no acute fracture or dislocation  A moderate size joint effusion is present  Significant joint space narrowing or bony erosion  Olecranon enthesopathy noted No lytic or blastic lesions are seen  There is diffuse soft tissue swelling although most pronounced posteriorly  Impression: No evidence of fracture or dislocation  Left elbow joint effusion  In the absence of acute trauma (which would raise suspicion for occult fracture), an inflammatory arthropathy should be considered  Soft tissue swelling without soft tissue air  The examination demonstrates a significant  finding and was documented as such in [facility] for liaison and referring practitioner notification  Workstation performed: [id number]     Xr Abdomen 1 View Kub    Result Date: 2/24/2020  Narrative: ABDOMEN INDICATION:   gastroparesis  COMPARISON:  None VIEWS:  AP supine FINDINGS: A peritoneal dialysis catheter tip projects over the midabdomen  There is a nonobstructive bowel gas pattern  No discernible free air on this supine study  Upright or left lateral decubitus imaging is more sensitive to detect subtle free air in the appropriate setting  No pathologic calcifications or soft tissue masses  Visualized lung bases are clear  Visualized osseous structures are unremarkable for the patient's age  Impression: Unremarkable abdomen  Workstation performed: [id number]     Ct Head Without Contrast    Result Date: 2/20/2020  Narrative: CT BRAIN - WITHOUT CONTRAST INDICATION:   Vertigo  COMPARISON:  12/9/2019   TECHNIQUE: CT examination of the brain was performed  In addition to axial images, coronal 2D reformatted images were created and submitted for interpretation  Radiation dose length product (DLP) for this visit:  917 mGy-cm   This examination, like all CT scans performed in the [facility], was performed utilizing techniques to minimize radiation dose exposure, including the use of iterative reconstruction and automated exposure control  IMAGE QUALITY:  Diagnostic  FINDINGS: PARENCHYMA:  Periventricular and subcortical hypoattenuating foci consistent with microangiopathic disease No acute intracranial hemorrhage or mass effect  VENTRICLES AND EXTRA-AXIAL SPACES:  No hydrocephalus or extra-axial collection  VISUALIZED ORBITS AND PARANASAL SINUSES:  No retro-orbital mass or proptosis  Mild mucosal thickening in the right posterior ethmoid air cells and maxillary sinuses, likely chronic  CALVARIUM AND EXTRACRANIAL SOFT TISSUES:  No lytic or blastic lesion or soft tissue mass  Impression: No acute intracranial abnormality  Workstation performed: [id number]     Mri Brain Wo Contrast    Result Date: 2/21/2020  Narrative: MRI BRAIN WITHOUT CONTRAST INDICATION: Dizziness  COMPARISON:   12/9/2019 and 2/20/2020  TECHNIQUE:  Sagittal T1, axial T2, axial FLAIR, axial T1, axial [place] and axial diffusion imaging  IMAGE QUALITY:  Diagnostic  FINDINGS: BRAIN PARENCHYMA: Chronic lacunar infarcts in the left centrum semiovale and left thalamus  Previously demonstrated punctate infarct in the posterior aspect of the velia is no longer visualized  No restricted diffusion to suggest an acute infarct  Periventricular and subcortical T2/FLAIR hyperintense foci consistent with microangiopathic disease  No intracranial hemorrhage or mass effect  VENTRICLES: Prominent perivascular spaces in the basal ganglia  Stable enlargement of the ventricles and sulci, consistent with volume loss, significantly advanced for age  SELLA AND PITUITARY GLAND:  Normal  ORBITS:  Intact globes and orbits  PARANASAL SINUSES:  Mucosal thickening throughout the ethmoid air cells and maxillary sinuses  VASCULATURE:  Evaluation of the major intracranial vasculature demonstrates appropriate flow voids  CALVARIUM AND SKULL BASE:  No osseous lesion  EXTRACRANIAL SOFT TISSUES:  No soft tissue mass  Impression: No evidence of acute infarct or intracranial hemorrhage  Workstation performed: [id number]       Review of Systems:  Review of Systems   Constitutional: Negative for diaphoresis, fatigue, fever and unexpected weight change  HENT: Negative  Respiratory: Negative for cough, shortness of breath and wheezing  Cardiovascular: Negative for chest pain, palpitations and leg swelling  Gastrointestinal: Negative for abdominal pain, diarrhea and nausea  Musculoskeletal: Negative for gait problem and myalgias  Skin: Negative for rash  Neurological: Negative for dizziness and numbness  Psychiatric/Behavioral: Negative  Physical Exam:  Physical Exam   Constitutional: He is oriented to person, place, and time  He appears well-developed and well-nourished  HENT:   Head: Normocephalic and atraumatic  Eyes: Pupils are equal, round, and reactive to light  Neck: Normal range of motion  Neck supple  No JVD present  Cardiovascular: Regular rhythm, S1 normal, S2 normal and normal pulses  Pulses:       Carotid pulses are 2+ on the right side, and 2+ on the left side  Pulmonary/Chest: Effort normal and breath sounds normal  He has no wheezes  He has no rales  Abdominal: Soft  Bowel sounds are normal  There is no tenderness  Musculoskeletal: Normal range of motion  He exhibits no edema or tenderness  Neurological: He is alert and oriented to person, place, and time  He has normal reflexes  No cranial nerve deficit  Skin: Skin is warm  Psychiatric: He has a normal mood and affect

## 2020-03-16 NOTE — TELEPHONE ENCOUNTER
Mom returned call  Patient was already seen 3/9/20  She was not aware another appt was needed and believes this was already taken care of

## 2020-03-17 ENCOUNTER — TELEPHONE (OUTPATIENT)
Dept: NEUROLOGY | Facility: CLINIC | Age: 35
End: 2020-03-17

## 2020-03-17 ENCOUNTER — APPOINTMENT (OUTPATIENT)
Dept: PHYSICAL THERAPY | Facility: CLINIC | Age: 35
End: 2020-03-17
Payer: MEDICARE

## 2020-03-17 ENCOUNTER — APPOINTMENT (OUTPATIENT)
Dept: OCCUPATIONAL THERAPY | Facility: CLINIC | Age: 35
End: 2020-03-17
Payer: MEDICARE

## 2020-03-17 NOTE — TELEPHONE ENCOUNTER
Pt's mother, Burnie Lennox, called back to relay some information to Dr Suzanne Lechuga  She states that she was speaking with him on the phone earlier  She states that the Epogen injections are not new for patient  He has been recieving them since August of 2018  Pt receives 15,000-20,000 units twice monthly, subq  Last Epogen injection prior to hospital stay was on 2/7/2020  Nephrologist: Dr Willard Nunez with [phone]    Burnie Lennox asked that if there's any additional questions for us to give her a call back

## 2020-03-18 ENCOUNTER — HOSPITAL ENCOUNTER (OUTPATIENT)
Dept: NEUROLOGY | Facility: CLINIC | Age: 35
Discharge: HOME | End: 2020-03-18
Payer: MEDICARE

## 2020-03-18 DIAGNOSIS — R56.9 PROVOKED SEIZURE (HCC): ICD-10-CM

## 2020-03-18 PROCEDURE — 95816 EEG AWAKE AND DROWSY: CPT

## 2020-03-18 PROCEDURE — 95812 EEG 41-60 MINUTES: CPT | Performed by: PSYCHIATRY & NEUROLOGY

## 2020-03-19 ENCOUNTER — TELEPHONE (OUTPATIENT)
Dept: NEUROLOGY | Facility: CLINIC | Age: 35
End: 2020-03-19

## 2020-03-19 NOTE — TELEPHONE ENCOUNTER
----- Message from Jennyfer Hernández MD sent at 3/18/2020  3:23 PM EDT -----  EEG shows background slowing  This could be seen in the setting of kidney disease, but not specific to any disease process  No presence of epileptiform discharges  But the lack of epileptiform discharges do not rule out the possibility of future seizures  Once the patient is weaned off of Depakote (in the future), will request a 24 hours ambulatory EEG study

## 2020-03-20 ENCOUNTER — APPOINTMENT (OUTPATIENT)
Dept: OCCUPATIONAL THERAPY | Facility: CLINIC | Age: 35
End: 2020-03-20
Payer: MEDICARE

## 2020-03-20 ENCOUNTER — APPOINTMENT (OUTPATIENT)
Dept: PHYSICAL THERAPY | Facility: CLINIC | Age: 35
End: 2020-03-20
Payer: MEDICARE

## 2020-03-20 NOTE — TELEPHONE ENCOUNTER
Called and advised pt of all of the below  He verbalized clear understanding of all instructions  States that he is on the list to get kidney transplant  Pt states that he is currently taking depakote er 500 mg bid  Doing well on this med

## 2020-03-23 LAB — LEFT EYE DIABETIC RETINOPATHY: NORMAL

## 2020-03-24 ENCOUNTER — APPOINTMENT (OUTPATIENT)
Dept: PHYSICAL THERAPY | Facility: CLINIC | Age: 35
End: 2020-03-24
Payer: MEDICARE

## 2020-03-24 ENCOUNTER — APPOINTMENT (OUTPATIENT)
Dept: OCCUPATIONAL THERAPY | Facility: CLINIC | Age: 35
End: 2020-03-24
Payer: MEDICARE

## 2020-03-27 ENCOUNTER — APPOINTMENT (OUTPATIENT)
Dept: OCCUPATIONAL THERAPY | Facility: CLINIC | Age: 35
End: 2020-03-27
Payer: MEDICARE

## 2020-03-27 ENCOUNTER — APPOINTMENT (OUTPATIENT)
Dept: PHYSICAL THERAPY | Facility: CLINIC | Age: 35
End: 2020-03-27
Payer: MEDICARE

## 2020-03-30 ENCOUNTER — APPOINTMENT (OUTPATIENT)
Dept: OCCUPATIONAL THERAPY | Facility: CLINIC | Age: 35
End: 2020-03-30
Payer: MEDICARE

## 2020-03-30 ENCOUNTER — APPOINTMENT (OUTPATIENT)
Dept: PHYSICAL THERAPY | Facility: CLINIC | Age: 35
End: 2020-03-30
Payer: MEDICARE

## 2020-03-30 ENCOUNTER — TELEPHONE (OUTPATIENT)
Dept: GASTROENTEROLOGY | Facility: CLINIC | Age: 35
End: 2020-03-30

## 2020-04-01 ENCOUNTER — REMOTE DEVICE CLINIC VISIT (OUTPATIENT)
Dept: CARDIOLOGY CLINIC | Facility: CLINIC | Age: 35
End: 2020-04-01
Payer: MEDICARE

## 2020-04-01 DIAGNOSIS — Z95.818 PRESENCE OF OTHER CARDIAC IMPLANTS AND GRAFTS: Primary | ICD-10-CM

## 2020-04-01 PROCEDURE — G2066 INTER DEVC REMOTE 30D: HCPCS | Performed by: INTERNAL MEDICINE

## 2020-04-01 PROCEDURE — 93298 REM INTERROG DEV EVAL SCRMS: CPT | Performed by: INTERNAL MEDICINE

## 2020-04-02 ENCOUNTER — APPOINTMENT (OUTPATIENT)
Dept: PHYSICAL THERAPY | Facility: CLINIC | Age: 35
End: 2020-04-02
Payer: MEDICARE

## 2020-04-02 ENCOUNTER — TELEMEDICINE (OUTPATIENT)
Dept: PHYSICAL THERAPY | Facility: CLINIC | Age: 35
End: 2020-04-02
Payer: MEDICARE

## 2020-04-02 DIAGNOSIS — R56.9 SEIZURES (HCC): ICD-10-CM

## 2020-04-02 DIAGNOSIS — R26.9 GAIT ABNORMALITY: Primary | ICD-10-CM

## 2020-04-02 PROCEDURE — 97112 NEUROMUSCULAR REEDUCATION: CPT | Performed by: PHYSICAL THERAPIST

## 2020-04-03 ENCOUNTER — TELEMEDICINE (OUTPATIENT)
Dept: OCCUPATIONAL THERAPY | Facility: CLINIC | Age: 35
End: 2020-04-03
Payer: MEDICARE

## 2020-04-03 DIAGNOSIS — R56.9 SEIZURE (HCC): Primary | ICD-10-CM

## 2020-04-03 PROCEDURE — 97110 THERAPEUTIC EXERCISES: CPT | Performed by: OCCUPATIONAL THERAPIST

## 2020-04-06 ENCOUNTER — TELEMEDICINE (OUTPATIENT)
Dept: OCCUPATIONAL THERAPY | Facility: CLINIC | Age: 35
End: 2020-04-06
Payer: MEDICARE

## 2020-04-06 DIAGNOSIS — R56.9 SEIZURE (HCC): Primary | ICD-10-CM

## 2020-04-06 PROCEDURE — 97110 THERAPEUTIC EXERCISES: CPT

## 2020-04-06 PROCEDURE — 97530 THERAPEUTIC ACTIVITIES: CPT

## 2020-04-09 ENCOUNTER — APPOINTMENT (OUTPATIENT)
Dept: PHYSICAL THERAPY | Facility: CLINIC | Age: 35
End: 2020-04-09
Payer: MEDICARE

## 2020-04-15 ENCOUNTER — APPOINTMENT (OUTPATIENT)
Dept: PHYSICAL THERAPY | Facility: CLINIC | Age: 35
End: 2020-04-15
Payer: MEDICARE

## 2020-04-15 ENCOUNTER — APPOINTMENT (OUTPATIENT)
Dept: OCCUPATIONAL THERAPY | Facility: CLINIC | Age: 35
End: 2020-04-15
Payer: MEDICARE

## 2020-04-16 ENCOUNTER — TELEMEDICINE (OUTPATIENT)
Dept: PHYSICAL THERAPY | Facility: CLINIC | Age: 35
End: 2020-04-16
Payer: MEDICARE

## 2020-04-16 DIAGNOSIS — R56.9 SEIZURES (HCC): ICD-10-CM

## 2020-04-16 DIAGNOSIS — R26.9 GAIT ABNORMALITY: Primary | ICD-10-CM

## 2020-04-16 PROCEDURE — 97112 NEUROMUSCULAR REEDUCATION: CPT | Performed by: PHYSICAL THERAPIST

## 2020-04-17 ENCOUNTER — APPOINTMENT (OUTPATIENT)
Dept: OCCUPATIONAL THERAPY | Facility: CLINIC | Age: 35
End: 2020-04-17
Payer: MEDICARE

## 2020-04-19 ENCOUNTER — HOSPITAL ENCOUNTER (INPATIENT)
Facility: HOSPITAL | Age: 35
LOS: 1 days | Discharge: HOME | DRG: 391 | End: 2020-04-21
Attending: EMERGENCY MEDICINE | Admitting: INTERNAL MEDICINE
Payer: MEDICARE

## 2020-04-19 ENCOUNTER — APPOINTMENT (EMERGENCY)
Dept: CT IMAGING | Facility: HOSPITAL | Age: 35
DRG: 391 | End: 2020-04-19
Payer: MEDICARE

## 2020-04-19 ENCOUNTER — APPOINTMENT (EMERGENCY)
Dept: RADIOLOGY | Facility: HOSPITAL | Age: 35
DRG: 391 | End: 2020-04-19
Payer: MEDICARE

## 2020-04-19 DIAGNOSIS — Z99.2 ESRD ON PERITONEAL DIALYSIS (HCC): ICD-10-CM

## 2020-04-19 DIAGNOSIS — R73.9 HYPERGLYCEMIA: ICD-10-CM

## 2020-04-19 DIAGNOSIS — E86.0 DEHYDRATION: ICD-10-CM

## 2020-04-19 DIAGNOSIS — D64.9 CHRONIC ANEMIA: ICD-10-CM

## 2020-04-19 DIAGNOSIS — Z99.2 END-STAGE RENAL DISEASE ON PERITONEAL DIALYSIS (HCC): ICD-10-CM

## 2020-04-19 DIAGNOSIS — N18.6 ESRD ON PERITONEAL DIALYSIS (HCC): ICD-10-CM

## 2020-04-19 DIAGNOSIS — N18.6 END-STAGE RENAL DISEASE ON PERITONEAL DIALYSIS (HCC): ICD-10-CM

## 2020-04-19 DIAGNOSIS — R11.2 NAUSEA AND VOMITING: Primary | ICD-10-CM

## 2020-04-19 LAB
ALBUMIN SERPL BCP-MCNC: 3.3 G/DL (ref 3.5–5)
ALP SERPL-CCNC: 70 U/L (ref 46–116)
ALT SERPL W P-5'-P-CCNC: 19 U/L (ref 12–78)
ANION GAP SERPL CALCULATED.3IONS-SCNC: 16 MMOL/L (ref 4–13)
APTT PPP: 32 SECONDS (ref 23–37)
AST SERPL W P-5'-P-CCNC: 16 U/L (ref 5–45)
BASE EX.OXY STD BLDV CALC-SCNC: 81.2 % (ref 60–80)
BASE EXCESS BLDV CALC-SCNC: 1.9 MMOL/L
BASOPHILS # BLD AUTO: 0.06 THOUSANDS/ΜL (ref 0–0.1)
BASOPHILS NFR BLD AUTO: 1 % (ref 0–1)
BETA-HYDROXYBUTYRATE: 1 MMOL/L
BILIRUB SERPL-MCNC: 0.54 MG/DL (ref 0.2–1)
BUN SERPL-MCNC: 47 MG/DL (ref 5–25)
CALCIUM SERPL-MCNC: 9 MG/DL (ref 8.3–10.1)
CHLORIDE SERPL-SCNC: 93 MMOL/L (ref 100–108)
CO2 SERPL-SCNC: 28 MMOL/L (ref 21–32)
CREAT SERPL-MCNC: 15.35 MG/DL (ref 0.6–1.3)
EOSINOPHIL # BLD AUTO: 0.26 THOUSAND/ΜL (ref 0–0.61)
EOSINOPHIL NFR BLD AUTO: 3 % (ref 0–6)
ERYTHROCYTE [DISTWIDTH] IN BLOOD BY AUTOMATED COUNT: 16.2 % (ref 11.6–15.1)
GFR SERPL CREATININE-BSD FRML MDRD: 4 ML/MIN/1.73SQ M
GLUCOSE SERPL-MCNC: 258 MG/DL (ref 65–140)
GLUCOSE SERPL-MCNC: 276 MG/DL (ref 65–140)
HCO3 BLDV-SCNC: 25.6 MMOL/L (ref 24–30)
HCT VFR BLD AUTO: 26 % (ref 36.5–49.3)
HGB BLD-MCNC: 8.7 G/DL (ref 12–17)
IMM GRANULOCYTES # BLD AUTO: 0.03 THOUSAND/UL (ref 0–0.2)
IMM GRANULOCYTES NFR BLD AUTO: 0 % (ref 0–2)
INR PPP: 1.11 (ref 0.84–1.19)
LACTATE SERPL-SCNC: 1.6 MMOL/L (ref 0.5–2)
LIPASE SERPL-CCNC: 60 U/L (ref 73–393)
LYMPHOCYTES # BLD AUTO: 1.33 THOUSANDS/ΜL (ref 0.6–4.47)
LYMPHOCYTES NFR BLD AUTO: 16 % (ref 14–44)
MAGNESIUM SERPL-MCNC: 2.2 MG/DL (ref 1.6–2.6)
MCH RBC QN AUTO: 33.2 PG (ref 26.8–34.3)
MCHC RBC AUTO-ENTMCNC: 33.5 G/DL (ref 31.4–37.4)
MCV RBC AUTO: 99 FL (ref 82–98)
MONOCYTES # BLD AUTO: 0.59 THOUSAND/ΜL (ref 0.17–1.22)
MONOCYTES NFR BLD AUTO: 7 % (ref 4–12)
NEUTROPHILS # BLD AUTO: 6.17 THOUSANDS/ΜL (ref 1.85–7.62)
NEUTS SEG NFR BLD AUTO: 73 % (ref 43–75)
NRBC BLD AUTO-RTO: 0 /100 WBCS
O2 CT BLDV-SCNC: 10.5 ML/DL
PCO2 BLDV: 36.6 MM HG (ref 42–50)
PH BLDV: 7.46 [PH] (ref 7.3–7.4)
PLATELET # BLD AUTO: 321 THOUSANDS/UL (ref 149–390)
PMV BLD AUTO: 9.7 FL (ref 8.9–12.7)
PO2 BLDV: 51.9 MM HG (ref 35–45)
POTASSIUM SERPL-SCNC: 4.1 MMOL/L (ref 3.5–5.3)
PROT SERPL-MCNC: 6.2 G/DL (ref 6.4–8.2)
PROTHROMBIN TIME: 13.7 SECONDS (ref 11.6–14.5)
RBC # BLD AUTO: 2.62 MILLION/UL (ref 3.88–5.62)
SODIUM SERPL-SCNC: 137 MMOL/L (ref 136–145)
TROPONIN I SERPL-MCNC: 0.03 NG/ML
WBC # BLD AUTO: 8.44 THOUSAND/UL (ref 4.31–10.16)

## 2020-04-19 PROCEDURE — 86901 BLOOD TYPING SEROLOGIC RH(D): CPT | Performed by: EMERGENCY MEDICINE

## 2020-04-19 PROCEDURE — 83690 ASSAY OF LIPASE: CPT | Performed by: EMERGENCY MEDICINE

## 2020-04-19 PROCEDURE — 99285 EMERGENCY DEPT VISIT HI MDM: CPT | Performed by: EMERGENCY MEDICINE

## 2020-04-19 PROCEDURE — 86850 RBC ANTIBODY SCREEN: CPT | Performed by: EMERGENCY MEDICINE

## 2020-04-19 PROCEDURE — 93005 ELECTROCARDIOGRAM TRACING: CPT

## 2020-04-19 PROCEDURE — 96361 HYDRATE IV INFUSION ADD-ON: CPT

## 2020-04-19 PROCEDURE — 70450 CT HEAD/BRAIN W/O DYE: CPT

## 2020-04-19 PROCEDURE — 87040 BLOOD CULTURE FOR BACTERIA: CPT | Performed by: EMERGENCY MEDICINE

## 2020-04-19 PROCEDURE — 85025 COMPLETE CBC W/AUTO DIFF WBC: CPT | Performed by: EMERGENCY MEDICINE

## 2020-04-19 PROCEDURE — 86900 BLOOD TYPING SEROLOGIC ABO: CPT | Performed by: EMERGENCY MEDICINE

## 2020-04-19 PROCEDURE — 83735 ASSAY OF MAGNESIUM: CPT | Performed by: EMERGENCY MEDICINE

## 2020-04-19 PROCEDURE — 36415 COLL VENOUS BLD VENIPUNCTURE: CPT | Performed by: EMERGENCY MEDICINE

## 2020-04-19 PROCEDURE — 87635 SARS-COV-2 COVID-19 AMP PRB: CPT | Performed by: EMERGENCY MEDICINE

## 2020-04-19 PROCEDURE — 82805 BLOOD GASES W/O2 SATURATION: CPT | Performed by: EMERGENCY MEDICINE

## 2020-04-19 PROCEDURE — 80053 COMPREHEN METABOLIC PANEL: CPT | Performed by: EMERGENCY MEDICINE

## 2020-04-19 PROCEDURE — 80164 ASSAY DIPROPYLACETIC ACD TOT: CPT | Performed by: EMERGENCY MEDICINE

## 2020-04-19 PROCEDURE — 83605 ASSAY OF LACTIC ACID: CPT | Performed by: EMERGENCY MEDICINE

## 2020-04-19 PROCEDURE — 84484 ASSAY OF TROPONIN QUANT: CPT | Performed by: EMERGENCY MEDICINE

## 2020-04-19 PROCEDURE — 99285 EMERGENCY DEPT VISIT HI MDM: CPT

## 2020-04-19 PROCEDURE — 85730 THROMBOPLASTIN TIME PARTIAL: CPT | Performed by: EMERGENCY MEDICINE

## 2020-04-19 PROCEDURE — 96374 THER/PROPH/DIAG INJ IV PUSH: CPT

## 2020-04-19 PROCEDURE — 82010 KETONE BODYS QUAN: CPT | Performed by: EMERGENCY MEDICINE

## 2020-04-19 PROCEDURE — 82948 REAGENT STRIP/BLOOD GLUCOSE: CPT

## 2020-04-19 PROCEDURE — 85610 PROTHROMBIN TIME: CPT | Performed by: EMERGENCY MEDICINE

## 2020-04-19 PROCEDURE — 71045 X-RAY EXAM CHEST 1 VIEW: CPT

## 2020-04-19 RX ORDER — ONDANSETRON 2 MG/ML
4 INJECTION INTRAMUSCULAR; INTRAVENOUS ONCE
Status: COMPLETED | OUTPATIENT
Start: 2020-04-19 | End: 2020-04-19

## 2020-04-19 RX ORDER — PROMETHAZINE HYDROCHLORIDE 25 MG/ML
12.5 INJECTION, SOLUTION INTRAMUSCULAR; INTRAVENOUS ONCE
Status: COMPLETED | OUTPATIENT
Start: 2020-04-19 | End: 2020-04-19

## 2020-04-19 RX ORDER — SODIUM CHLORIDE 9 MG/ML
75 INJECTION, SOLUTION INTRAVENOUS CONTINUOUS
Status: DISCONTINUED | OUTPATIENT
Start: 2020-04-20 | End: 2020-04-21

## 2020-04-19 RX ADMIN — SODIUM CHLORIDE 75 ML/HR: 0.9 INJECTION, SOLUTION INTRAVENOUS at 23:55

## 2020-04-19 RX ADMIN — SODIUM CHLORIDE 250 ML: 0.9 INJECTION, SOLUTION INTRAVENOUS at 23:06

## 2020-04-19 RX ADMIN — ONDANSETRON 4 MG: 2 INJECTION INTRAMUSCULAR; INTRAVENOUS at 23:02

## 2020-04-19 RX ADMIN — PROMETHAZINE HYDROCHLORIDE 12.5 MG: 25 INJECTION INTRAMUSCULAR; INTRAVENOUS at 23:55

## 2020-04-20 PROBLEM — R51.9 HEADACHE: Status: ACTIVE | Noted: 2020-04-20

## 2020-04-20 LAB
ABO GROUP BLD: NORMAL
ANION GAP SERPL CALCULATED.3IONS-SCNC: 22 MMOL/L (ref 4–13)
ATRIAL RATE: 97 BPM
BASOPHILS # BLD AUTO: 0.07 THOUSANDS/ΜL (ref 0–0.1)
BASOPHILS NFR BLD AUTO: 1 % (ref 0–1)
BLD GP AB SCN SERPL QL: NEGATIVE
BUN SERPL-MCNC: 51 MG/DL (ref 5–25)
CALCIUM SERPL-MCNC: 8.8 MG/DL (ref 8.3–10.1)
CHLORIDE SERPL-SCNC: 91 MMOL/L (ref 100–108)
CO2 SERPL-SCNC: 23 MMOL/L (ref 21–32)
CREAT SERPL-MCNC: 16.28 MG/DL (ref 0.6–1.3)
EOSINOPHIL # BLD AUTO: 0.12 THOUSAND/ΜL (ref 0–0.61)
EOSINOPHIL NFR BLD AUTO: 1 % (ref 0–6)
ERYTHROCYTE [DISTWIDTH] IN BLOOD BY AUTOMATED COUNT: 16.3 % (ref 11.6–15.1)
GFR SERPL CREATININE-BSD FRML MDRD: 3 ML/MIN/1.73SQ M
GLUCOSE SERPL-MCNC: 117 MG/DL (ref 65–140)
GLUCOSE SERPL-MCNC: 127 MG/DL (ref 65–140)
GLUCOSE SERPL-MCNC: 135 MG/DL (ref 65–140)
GLUCOSE SERPL-MCNC: 292 MG/DL (ref 65–140)
GLUCOSE SERPL-MCNC: 296 MG/DL (ref 65–140)
GLUCOSE SERPL-MCNC: 377 MG/DL (ref 65–140)
GLUCOSE SERPL-MCNC: 432 MG/DL (ref 65–140)
GLUCOSE SERPL-MCNC: 472 MG/DL (ref 65–140)
GLUCOSE SERPL-MCNC: 490 MG/DL (ref 65–140)
GLUCOSE SERPL-MCNC: 78 MG/DL (ref 65–140)
HCT VFR BLD AUTO: 25.3 % (ref 36.5–49.3)
HGB BLD-MCNC: 8.2 G/DL (ref 12–17)
IMM GRANULOCYTES # BLD AUTO: 0.03 THOUSAND/UL (ref 0–0.2)
IMM GRANULOCYTES NFR BLD AUTO: 0 % (ref 0–2)
LYMPHOCYTES # BLD AUTO: 1.05 THOUSANDS/ΜL (ref 0.6–4.47)
LYMPHOCYTES NFR BLD AUTO: 11 % (ref 14–44)
MCH RBC QN AUTO: 32.8 PG (ref 26.8–34.3)
MCHC RBC AUTO-ENTMCNC: 32.4 G/DL (ref 31.4–37.4)
MCV RBC AUTO: 101 FL (ref 82–98)
MONOCYTES # BLD AUTO: 0.59 THOUSAND/ΜL (ref 0.17–1.22)
MONOCYTES NFR BLD AUTO: 6 % (ref 4–12)
NEUTROPHILS # BLD AUTO: 7.72 THOUSANDS/ΜL (ref 1.85–7.62)
NEUTS SEG NFR BLD AUTO: 81 % (ref 43–75)
NRBC BLD AUTO-RTO: 0 /100 WBCS
P AXIS: 87 DEGREES
PLATELET # BLD AUTO: 304 THOUSANDS/UL (ref 149–390)
PMV BLD AUTO: 10 FL (ref 8.9–12.7)
POTASSIUM SERPL-SCNC: 4.8 MMOL/L (ref 3.5–5.3)
PR INTERVAL: 154 MS
QRS AXIS: 79 DEGREES
QRSD INTERVAL: 72 MS
QT INTERVAL: 340 MS
QTC INTERVAL: 431 MS
RBC # BLD AUTO: 2.5 MILLION/UL (ref 3.88–5.62)
RH BLD: POSITIVE
SARS-COV-2 RNA RESP QL NAA+PROBE: NEGATIVE
SODIUM SERPL-SCNC: 136 MMOL/L (ref 136–145)
SPECIMEN EXPIRATION DATE: NORMAL
T WAVE AXIS: 109 DEGREES
VALPROATE SERPL-MCNC: 14 UG/ML (ref 50–100)
VENTRICULAR RATE: 97 BPM
WBC # BLD AUTO: 9.58 THOUSAND/UL (ref 4.31–10.16)

## 2020-04-20 PROCEDURE — 3E1M39Z IRRIGATION OF PERITONEAL CAVITY USING DIALYSATE, PERCUTANEOUS APPROACH: ICD-10-PCS | Performed by: INTERNAL MEDICINE

## 2020-04-20 PROCEDURE — 99220 PR INITIAL OBSERVATION CARE/DAY 70 MINUTES: CPT | Performed by: INTERNAL MEDICINE

## 2020-04-20 PROCEDURE — 82948 REAGENT STRIP/BLOOD GLUCOSE: CPT

## 2020-04-20 PROCEDURE — 93010 ELECTROCARDIOGRAM REPORT: CPT | Performed by: INTERNAL MEDICINE

## 2020-04-20 PROCEDURE — 85025 COMPLETE CBC W/AUTO DIFF WBC: CPT | Performed by: NURSE PRACTITIONER

## 2020-04-20 PROCEDURE — 80048 BASIC METABOLIC PNL TOTAL CA: CPT | Performed by: NURSE PRACTITIONER

## 2020-04-20 PROCEDURE — 99223 1ST HOSP IP/OBS HIGH 75: CPT | Performed by: INTERNAL MEDICINE

## 2020-04-20 PROCEDURE — 87081 CULTURE SCREEN ONLY: CPT | Performed by: INTERNAL MEDICINE

## 2020-04-20 RX ORDER — TORSEMIDE 100 MG/1
100 TABLET ORAL
Status: DISCONTINUED | OUTPATIENT
Start: 2020-04-20 | End: 2020-04-20

## 2020-04-20 RX ORDER — ATORVASTATIN CALCIUM 40 MG/1
40 TABLET, FILM COATED ORAL
Status: DISCONTINUED | OUTPATIENT
Start: 2020-04-20 | End: 2020-04-21 | Stop reason: HOSPADM

## 2020-04-20 RX ORDER — CLONIDINE HYDROCHLORIDE 0.1 MG/1
0.1 TABLET ORAL 3 TIMES DAILY
Status: DISCONTINUED | OUTPATIENT
Start: 2020-04-20 | End: 2020-04-21 | Stop reason: HOSPADM

## 2020-04-20 RX ORDER — METOCLOPRAMIDE HYDROCHLORIDE 5 MG/ML
10 INJECTION INTRAMUSCULAR; INTRAVENOUS EVERY 6 HOURS PRN
Status: DISCONTINUED | OUTPATIENT
Start: 2020-04-20 | End: 2020-04-21 | Stop reason: HOSPADM

## 2020-04-20 RX ORDER — ASPIRIN 81 MG/1
81 TABLET, CHEWABLE ORAL DAILY
Status: DISCONTINUED | OUTPATIENT
Start: 2020-04-20 | End: 2020-04-21 | Stop reason: HOSPADM

## 2020-04-20 RX ORDER — INSULIN GLARGINE 100 [IU]/ML
12 INJECTION, SOLUTION SUBCUTANEOUS EVERY 12 HOURS SCHEDULED
Status: DISCONTINUED | OUTPATIENT
Start: 2020-04-20 | End: 2020-04-20

## 2020-04-20 RX ORDER — LABETALOL 100 MG/1
300 TABLET, FILM COATED ORAL EVERY 12 HOURS SCHEDULED
Status: DISCONTINUED | OUTPATIENT
Start: 2020-04-20 | End: 2020-04-21 | Stop reason: HOSPADM

## 2020-04-20 RX ORDER — DOXAZOSIN MESYLATE 4 MG/1
4 TABLET ORAL 2 TIMES DAILY
Status: DISCONTINUED | OUTPATIENT
Start: 2020-04-20 | End: 2020-04-21 | Stop reason: HOSPADM

## 2020-04-20 RX ORDER — INSULIN GLARGINE 100 [IU]/ML
10 INJECTION, SOLUTION SUBCUTANEOUS EVERY 12 HOURS SCHEDULED
Status: DISCONTINUED | OUTPATIENT
Start: 2020-04-20 | End: 2020-04-21 | Stop reason: HOSPADM

## 2020-04-20 RX ORDER — ACETAMINOPHEN 325 MG/1
650 TABLET ORAL EVERY 6 HOURS PRN
Status: DISCONTINUED | OUTPATIENT
Start: 2020-04-20 | End: 2020-04-21 | Stop reason: HOSPADM

## 2020-04-20 RX ORDER — GABAPENTIN 100 MG/1
100 CAPSULE ORAL
Status: DISCONTINUED | OUTPATIENT
Start: 2020-04-20 | End: 2020-04-21 | Stop reason: HOSPADM

## 2020-04-20 RX ORDER — SACCHAROMYCES BOULARDII 250 MG
250 CAPSULE ORAL 2 TIMES DAILY
Status: DISCONTINUED | OUTPATIENT
Start: 2020-04-20 | End: 2020-04-21 | Stop reason: HOSPADM

## 2020-04-20 RX ORDER — ONDANSETRON 2 MG/ML
4 INJECTION INTRAMUSCULAR; INTRAVENOUS ONCE
Status: COMPLETED | OUTPATIENT
Start: 2020-04-20 | End: 2020-04-20

## 2020-04-20 RX ORDER — GENTAMICIN SULFATE 1 MG/G
1 OINTMENT TOPICAL DAILY
Status: DISCONTINUED | OUTPATIENT
Start: 2020-04-20 | End: 2020-04-21 | Stop reason: HOSPADM

## 2020-04-20 RX ORDER — CHOLECALCIFEROL (VITAMIN D3) 10 MCG
1 TABLET ORAL DAILY
Status: DISCONTINUED | OUTPATIENT
Start: 2020-04-20 | End: 2020-04-21 | Stop reason: HOSPADM

## 2020-04-20 RX ORDER — LISINOPRIL 20 MG/1
40 TABLET ORAL DAILY
Status: DISCONTINUED | OUTPATIENT
Start: 2020-04-20 | End: 2020-04-21 | Stop reason: HOSPADM

## 2020-04-20 RX ORDER — FAMOTIDINE 20 MG/1
10 TABLET, FILM COATED ORAL
Status: DISCONTINUED | OUTPATIENT
Start: 2020-04-20 | End: 2020-04-21 | Stop reason: HOSPADM

## 2020-04-20 RX ORDER — DOXAZOSIN MESYLATE 1 MG/1
2 TABLET ORAL
Status: DISCONTINUED | OUTPATIENT
Start: 2020-04-20 | End: 2020-04-20

## 2020-04-20 RX ORDER — NIFEDIPINE 30 MG/1
60 TABLET, EXTENDED RELEASE ORAL 2 TIMES DAILY
Status: DISCONTINUED | OUTPATIENT
Start: 2020-04-20 | End: 2020-04-21 | Stop reason: HOSPADM

## 2020-04-20 RX ORDER — ONDANSETRON 2 MG/ML
4 INJECTION INTRAMUSCULAR; INTRAVENOUS EVERY 6 HOURS PRN
Status: DISCONTINUED | OUTPATIENT
Start: 2020-04-20 | End: 2020-04-21 | Stop reason: HOSPADM

## 2020-04-20 RX ORDER — INSULIN GLARGINE 100 [IU]/ML
5 INJECTION, SOLUTION SUBCUTANEOUS EVERY 12 HOURS SCHEDULED
Status: DISCONTINUED | OUTPATIENT
Start: 2020-04-20 | End: 2020-04-20

## 2020-04-20 RX ORDER — ESCITALOPRAM OXALATE 10 MG/1
10 TABLET ORAL DAILY
Status: DISCONTINUED | OUTPATIENT
Start: 2020-04-20 | End: 2020-04-21 | Stop reason: HOSPADM

## 2020-04-20 RX ORDER — DIVALPROEX SODIUM 500 MG/1
500 TABLET, EXTENDED RELEASE ORAL 2 TIMES DAILY
Status: DISCONTINUED | OUTPATIENT
Start: 2020-04-20 | End: 2020-04-21 | Stop reason: HOSPADM

## 2020-04-20 RX ORDER — HYDRALAZINE HYDROCHLORIDE 25 MG/1
50 TABLET, FILM COATED ORAL 3 TIMES DAILY
Status: DISCONTINUED | OUTPATIENT
Start: 2020-04-20 | End: 2020-04-21 | Stop reason: HOSPADM

## 2020-04-20 RX ORDER — CINACALCET 30 MG/1
60 TABLET, FILM COATED ORAL DAILY
Status: DISCONTINUED | OUTPATIENT
Start: 2020-04-21 | End: 2020-04-21 | Stop reason: HOSPADM

## 2020-04-20 RX ORDER — LABETALOL 20 MG/4 ML (5 MG/ML) INTRAVENOUS SYRINGE
10 EVERY 6 HOURS PRN
Status: DISCONTINUED | OUTPATIENT
Start: 2020-04-20 | End: 2020-04-21 | Stop reason: HOSPADM

## 2020-04-20 RX ORDER — MELATONIN
2000 DAILY
Status: DISCONTINUED | OUTPATIENT
Start: 2020-04-20 | End: 2020-04-21 | Stop reason: HOSPADM

## 2020-04-20 RX ORDER — CINACALCET 30 MG/1
90 TABLET, FILM COATED ORAL DAILY
Status: DISCONTINUED | OUTPATIENT
Start: 2020-04-20 | End: 2020-04-20

## 2020-04-20 RX ADMIN — INSULIN LISPRO 6 UNITS: 100 INJECTION, SOLUTION INTRAVENOUS; SUBCUTANEOUS at 08:52

## 2020-04-20 RX ADMIN — DOXAZOSIN 4 MG: 4 TABLET ORAL at 17:13

## 2020-04-20 RX ADMIN — ONDANSETRON 4 MG: 2 INJECTION INTRAMUSCULAR; INTRAVENOUS at 01:00

## 2020-04-20 RX ADMIN — ONDANSETRON 4 MG: 2 INJECTION INTRAMUSCULAR; INTRAVENOUS at 04:43

## 2020-04-20 RX ADMIN — LABETALOL HYDROCHLORIDE 100 MG: 100 TABLET, FILM COATED ORAL at 09:49

## 2020-04-20 RX ADMIN — SODIUM CHLORIDE 75 ML/HR: 0.9 INJECTION, SOLUTION INTRAVENOUS at 13:01

## 2020-04-20 RX ADMIN — METOCLOPRAMIDE HYDROCHLORIDE 10 MG: 5 INJECTION INTRAMUSCULAR; INTRAVENOUS at 08:49

## 2020-04-20 RX ADMIN — CLONIDINE HYDROCHLORIDE 0.1 MG: 0.1 TABLET ORAL at 21:09

## 2020-04-20 RX ADMIN — ONDANSETRON 4 MG: 2 INJECTION INTRAMUSCULAR; INTRAVENOUS at 19:49

## 2020-04-20 RX ADMIN — ASPIRIN 81 MG 81 MG: 81 TABLET ORAL at 09:50

## 2020-04-20 RX ADMIN — NIFEDIPINE 60 MG: 30 TABLET, FILM COATED, EXTENDED RELEASE ORAL at 09:51

## 2020-04-20 RX ADMIN — NIFEDIPINE 60 MG: 30 TABLET, FILM COATED, EXTENDED RELEASE ORAL at 17:13

## 2020-04-20 RX ADMIN — Medication 250 MG: at 17:13

## 2020-04-20 RX ADMIN — LISINOPRIL 20 MG: 20 TABLET ORAL at 09:49

## 2020-04-20 RX ADMIN — INSULIN GLARGINE 12 UNITS: 100 INJECTION, SOLUTION SUBCUTANEOUS at 08:53

## 2020-04-20 RX ADMIN — HYDRALAZINE HYDROCHLORIDE 50 MG: 25 TABLET ORAL at 09:49

## 2020-04-20 RX ADMIN — LABETALOL 20 MG/4 ML (5 MG/ML) INTRAVENOUS SYRINGE 10 MG: at 03:21

## 2020-04-20 RX ADMIN — DIVALPROEX SODIUM 500 MG: 500 TABLET, FILM COATED, EXTENDED RELEASE ORAL at 09:49

## 2020-04-20 RX ADMIN — HYDRALAZINE HYDROCHLORIDE 50 MG: 25 TABLET ORAL at 21:09

## 2020-04-20 RX ADMIN — DIVALPROEX SODIUM 500 MG: 500 TABLET, FILM COATED, EXTENDED RELEASE ORAL at 17:14

## 2020-04-20 RX ADMIN — HYDRALAZINE HYDROCHLORIDE 50 MG: 25 TABLET ORAL at 17:14

## 2020-04-20 RX ADMIN — GABAPENTIN 100 MG: 100 CAPSULE ORAL at 21:38

## 2020-04-20 RX ADMIN — GENTAMICIN SULFATE 1 APPLICATION: 1 OINTMENT TOPICAL at 09:51

## 2020-04-20 RX ADMIN — INSULIN LISPRO 4 UNITS: 100 INJECTION, SOLUTION INTRAVENOUS; SUBCUTANEOUS at 21:39

## 2020-04-20 RX ADMIN — LABETALOL HYDROCHLORIDE 300 MG: 100 TABLET, FILM COATED ORAL at 21:09

## 2020-04-20 RX ADMIN — CLONIDINE HYDROCHLORIDE 0.1 MG: 0.1 TABLET ORAL at 17:13

## 2020-04-20 RX ADMIN — ATORVASTATIN CALCIUM 40 MG: 40 TABLET, FILM COATED ORAL at 21:38

## 2020-04-20 RX ADMIN — INSULIN GLARGINE 10 UNITS: 100 INJECTION, SOLUTION SUBCUTANEOUS at 21:39

## 2020-04-20 RX ADMIN — METOCLOPRAMIDE HYDROCHLORIDE 10 MG: 5 INJECTION INTRAMUSCULAR; INTRAVENOUS at 21:39

## 2020-04-20 RX ADMIN — FAMOTIDINE 10 MG: 20 TABLET, FILM COATED ORAL at 21:37

## 2020-04-20 RX ADMIN — CLONIDINE HYDROCHLORIDE 0.1 MG: 0.1 TABLET ORAL at 09:49

## 2020-04-20 RX ADMIN — SODIUM CHLORIDE 10 UNITS/HR: 9 INJECTION, SOLUTION INTRAVENOUS at 11:41

## 2020-04-21 VITALS
BODY MASS INDEX: 32.22 KG/M2 | RESPIRATION RATE: 18 BRPM | TEMPERATURE: 98.8 F | OXYGEN SATURATION: 99 % | WEIGHT: 230.16 LBS | HEIGHT: 71 IN | SYSTOLIC BLOOD PRESSURE: 167 MMHG | DIASTOLIC BLOOD PRESSURE: 82 MMHG | HEART RATE: 84 BPM

## 2020-04-21 PROBLEM — R11.2 NAUSEA AND VOMITING: Status: RESOLVED | Noted: 2018-01-26 | Resolved: 2020-04-21

## 2020-04-21 PROBLEM — R51.9 HEADACHE: Status: RESOLVED | Noted: 2020-04-20 | Resolved: 2020-04-21

## 2020-04-21 LAB
ANION GAP SERPL CALCULATED.3IONS-SCNC: 14 MMOL/L (ref 4–13)
BUN SERPL-MCNC: 52 MG/DL (ref 5–25)
CALCIUM SERPL-MCNC: 9 MG/DL (ref 8.3–10.1)
CHLORIDE SERPL-SCNC: 96 MMOL/L (ref 100–108)
CO2 SERPL-SCNC: 28 MMOL/L (ref 21–32)
CREAT SERPL-MCNC: 16.28 MG/DL (ref 0.6–1.3)
ERYTHROCYTE [DISTWIDTH] IN BLOOD BY AUTOMATED COUNT: 15.5 % (ref 11.6–15.1)
GFR SERPL CREATININE-BSD FRML MDRD: 3 ML/MIN/1.73SQ M
GLUCOSE SERPL-MCNC: 143 MG/DL (ref 65–140)
GLUCOSE SERPL-MCNC: 153 MG/DL (ref 65–140)
GLUCOSE SERPL-MCNC: 245 MG/DL (ref 65–140)
GLUCOSE SERPL-MCNC: 317 MG/DL (ref 65–140)
GLUCOSE SERPL-MCNC: 318 MG/DL (ref 65–140)
GLUCOSE SERPL-MCNC: 43 MG/DL (ref 65–140)
GLUCOSE SERPL-MCNC: 51 MG/DL (ref 65–140)
GLUCOSE SERPL-MCNC: 67 MG/DL (ref 65–140)
GLUCOSE SERPL-MCNC: 96 MG/DL (ref 65–140)
HCT VFR BLD AUTO: 22 % (ref 36.5–49.3)
HGB BLD-MCNC: 7.2 G/DL (ref 12–17)
MAGNESIUM SERPL-MCNC: 2.2 MG/DL (ref 1.6–2.6)
MCH RBC QN AUTO: 32.7 PG (ref 26.8–34.3)
MCHC RBC AUTO-ENTMCNC: 32.7 G/DL (ref 31.4–37.4)
MCV RBC AUTO: 100 FL (ref 82–98)
MRSA NOSE QL CULT: NORMAL
PHOSPHATE SERPL-MCNC: 8.7 MG/DL (ref 2.7–4.5)
PLATELET # BLD AUTO: 280 THOUSANDS/UL (ref 149–390)
PMV BLD AUTO: 9.8 FL (ref 8.9–12.7)
POTASSIUM SERPL-SCNC: 4 MMOL/L (ref 3.5–5.3)
RBC # BLD AUTO: 2.2 MILLION/UL (ref 3.88–5.62)
SODIUM SERPL-SCNC: 138 MMOL/L (ref 136–145)
WBC # BLD AUTO: 7.41 THOUSAND/UL (ref 4.31–10.16)

## 2020-04-21 PROCEDURE — 84100 ASSAY OF PHOSPHORUS: CPT | Performed by: INTERNAL MEDICINE

## 2020-04-21 PROCEDURE — 99233 SBSQ HOSP IP/OBS HIGH 50: CPT | Performed by: INTERNAL MEDICINE

## 2020-04-21 PROCEDURE — 99238 HOSP IP/OBS DSCHRG MGMT 30/<: CPT | Performed by: FAMILY MEDICINE

## 2020-04-21 PROCEDURE — 82948 REAGENT STRIP/BLOOD GLUCOSE: CPT

## 2020-04-21 PROCEDURE — 83735 ASSAY OF MAGNESIUM: CPT | Performed by: INTERNAL MEDICINE

## 2020-04-21 PROCEDURE — RECHECK: Performed by: FAMILY MEDICINE

## 2020-04-21 PROCEDURE — 85027 COMPLETE CBC AUTOMATED: CPT | Performed by: INTERNAL MEDICINE

## 2020-04-21 PROCEDURE — 80048 BASIC METABOLIC PNL TOTAL CA: CPT | Performed by: INTERNAL MEDICINE

## 2020-04-21 RX ORDER — DEXTROSE MONOHYDRATE 25 G/50ML
25 INJECTION, SOLUTION INTRAVENOUS ONCE
Status: COMPLETED | OUTPATIENT
Start: 2020-04-21 | End: 2020-04-21

## 2020-04-21 RX ORDER — CINACALCET 60 MG/1
60 TABLET, FILM COATED ORAL DAILY
Start: 2020-04-21 | End: 2020-11-25 | Stop reason: HOSPADM

## 2020-04-21 RX ORDER — DEXTROSE MONOHYDRATE 25 G/50ML
INJECTION, SOLUTION INTRAVENOUS
Status: COMPLETED
Start: 2020-04-21 | End: 2020-04-21

## 2020-04-21 RX ADMIN — HYDRALAZINE HYDROCHLORIDE 50 MG: 25 TABLET ORAL at 07:52

## 2020-04-21 RX ADMIN — INSULIN LISPRO 5 UNITS: 100 INJECTION, SOLUTION INTRAVENOUS; SUBCUTANEOUS at 07:44

## 2020-04-21 RX ADMIN — Medication 250 MG: at 07:50

## 2020-04-21 RX ADMIN — Medication 250 MG: at 18:00

## 2020-04-21 RX ADMIN — ESCITALOPRAM OXALATE 10 MG: 10 TABLET ORAL at 07:50

## 2020-04-21 RX ADMIN — DOXAZOSIN 4 MG: 4 TABLET ORAL at 18:01

## 2020-04-21 RX ADMIN — CLONIDINE HYDROCHLORIDE 0.1 MG: 0.1 TABLET ORAL at 18:01

## 2020-04-21 RX ADMIN — NIFEDIPINE 60 MG: 30 TABLET, FILM COATED, EXTENDED RELEASE ORAL at 07:52

## 2020-04-21 RX ADMIN — DOXAZOSIN 4 MG: 4 TABLET ORAL at 07:52

## 2020-04-21 RX ADMIN — SODIUM CHLORIDE 75 ML/HR: 0.9 INJECTION, SOLUTION INTRAVENOUS at 06:59

## 2020-04-21 RX ADMIN — CALCIUM ACETATE 667 MG: 667 CAPSULE ORAL at 07:45

## 2020-04-21 RX ADMIN — DIVALPROEX SODIUM 500 MG: 500 TABLET, FILM COATED, EXTENDED RELEASE ORAL at 07:51

## 2020-04-21 RX ADMIN — DIVALPROEX SODIUM 500 MG: 500 TABLET, FILM COATED, EXTENDED RELEASE ORAL at 18:00

## 2020-04-21 RX ADMIN — Medication 1 CAPSULE: at 07:50

## 2020-04-21 RX ADMIN — CINACALCET 60 MG: 30 TABLET, FILM COATED ORAL at 07:59

## 2020-04-21 RX ADMIN — NIFEDIPINE 60 MG: 30 TABLET, FILM COATED, EXTENDED RELEASE ORAL at 18:00

## 2020-04-21 RX ADMIN — CLONIDINE HYDROCHLORIDE 0.1 MG: 0.1 TABLET ORAL at 07:52

## 2020-04-21 RX ADMIN — INSULIN LISPRO 5 UNITS: 100 INJECTION, SOLUTION INTRAVENOUS; SUBCUTANEOUS at 12:59

## 2020-04-21 RX ADMIN — GENTAMICIN SULFATE 1 APPLICATION: 1 OINTMENT TOPICAL at 07:59

## 2020-04-21 RX ADMIN — DEXTROSE MONOHYDRATE 25 ML: 25 INJECTION, SOLUTION INTRAVENOUS at 14:42

## 2020-04-21 RX ADMIN — INSULIN LISPRO 5 UNITS: 100 INJECTION, SOLUTION INTRAVENOUS; SUBCUTANEOUS at 07:45

## 2020-04-21 RX ADMIN — ASPIRIN 81 MG 81 MG: 81 TABLET ORAL at 07:51

## 2020-04-21 RX ADMIN — INSULIN LISPRO 1 UNITS: 100 INJECTION, SOLUTION INTRAVENOUS; SUBCUTANEOUS at 12:58

## 2020-04-21 RX ADMIN — LABETALOL HYDROCHLORIDE 300 MG: 100 TABLET, FILM COATED ORAL at 07:51

## 2020-04-21 RX ADMIN — CALCIUM ACETATE 667 MG: 667 CAPSULE ORAL at 12:58

## 2020-04-21 RX ADMIN — EPOETIN ALFA 20000 UNITS: 20000 SOLUTION INTRAVENOUS; SUBCUTANEOUS at 18:07

## 2020-04-21 RX ADMIN — CALCIUM ACETATE 667 MG: 667 CAPSULE ORAL at 18:01

## 2020-04-21 RX ADMIN — ONDANSETRON 4 MG: 2 INJECTION INTRAMUSCULAR; INTRAVENOUS at 07:49

## 2020-04-21 RX ADMIN — LISINOPRIL 40 MG: 20 TABLET ORAL at 07:52

## 2020-04-21 RX ADMIN — MELATONIN 2000 UNITS: at 07:51

## 2020-04-21 RX ADMIN — INSULIN GLARGINE 10 UNITS: 100 INJECTION, SOLUTION SUBCUTANEOUS at 07:52

## 2020-04-21 RX ADMIN — HYDRALAZINE HYDROCHLORIDE 50 MG: 25 TABLET ORAL at 17:01

## 2020-04-22 ENCOUNTER — TRANSITIONAL CARE MANAGEMENT (OUTPATIENT)
Dept: INTERNAL MEDICINE CLINIC | Facility: CLINIC | Age: 35
End: 2020-04-22

## 2020-04-23 ENCOUNTER — TELEMEDICINE (OUTPATIENT)
Dept: INTERNAL MEDICINE CLINIC | Facility: CLINIC | Age: 35
End: 2020-04-23
Payer: MEDICARE

## 2020-04-23 ENCOUNTER — APPOINTMENT (OUTPATIENT)
Dept: PHYSICAL THERAPY | Facility: CLINIC | Age: 35
End: 2020-04-23
Payer: MEDICARE

## 2020-04-23 ENCOUNTER — APPOINTMENT (OUTPATIENT)
Dept: OCCUPATIONAL THERAPY | Facility: CLINIC | Age: 35
End: 2020-04-23
Payer: MEDICARE

## 2020-04-23 VITALS — DIASTOLIC BLOOD PRESSURE: 80 MMHG | SYSTOLIC BLOOD PRESSURE: 121 MMHG

## 2020-04-23 DIAGNOSIS — E11.43 DIABETIC GASTROPARESIS (HCC): ICD-10-CM

## 2020-04-23 DIAGNOSIS — K52.9 GASTROENTERITIS: ICD-10-CM

## 2020-04-23 DIAGNOSIS — I15.0 RENOVASCULAR HYPERTENSION: Primary | ICD-10-CM

## 2020-04-23 DIAGNOSIS — K31.84 DIABETIC GASTROPARESIS (HCC): ICD-10-CM

## 2020-04-23 PROCEDURE — 99495 TRANSJ CARE MGMT MOD F2F 14D: CPT | Performed by: INTERNAL MEDICINE

## 2020-04-25 LAB
BACTERIA BLD CULT: NORMAL
BACTERIA BLD CULT: NORMAL

## 2020-04-28 ENCOUNTER — APPOINTMENT (OUTPATIENT)
Dept: OCCUPATIONAL THERAPY | Facility: CLINIC | Age: 35
End: 2020-04-28
Payer: MEDICARE

## 2020-04-28 ENCOUNTER — APPOINTMENT (OUTPATIENT)
Dept: PHYSICAL THERAPY | Facility: CLINIC | Age: 35
End: 2020-04-28
Payer: MEDICARE

## 2020-05-07 ENCOUNTER — TELEPHONE (OUTPATIENT)
Dept: INTERNAL MEDICINE CLINIC | Facility: CLINIC | Age: 35
End: 2020-05-07

## 2020-05-07 DIAGNOSIS — R56.9 PROVOKED SEIZURE (HCC): ICD-10-CM

## 2020-05-07 DIAGNOSIS — Z86.73 HISTORY OF LACUNAR CEREBROVASCULAR ACCIDENT (CVA): Primary | Chronic | ICD-10-CM

## 2020-05-07 DIAGNOSIS — R42 VERTIGO: ICD-10-CM

## 2020-05-07 DIAGNOSIS — G62.9 PERIPHERAL POLYNEUROPATHY: ICD-10-CM

## 2020-05-08 ENCOUNTER — APPOINTMENT (OUTPATIENT)
Dept: LAB | Facility: CLINIC | Age: 35
End: 2020-05-08
Payer: MEDICARE

## 2020-05-08 ENCOUNTER — TRANSCRIBE ORDERS (OUTPATIENT)
Dept: LAB | Facility: CLINIC | Age: 35
End: 2020-05-08

## 2020-05-08 DIAGNOSIS — R56.9 PROVOKED SEIZURE (HCC): ICD-10-CM

## 2020-05-08 PROCEDURE — 36415 COLL VENOUS BLD VENIPUNCTURE: CPT

## 2020-05-08 PROCEDURE — 80165 DIPROPYLACETIC ACID FREE: CPT

## 2020-05-11 LAB — VALPROATE FREE SERPL-MCNC: 8.4 UG/ML (ref 6–22)

## 2020-05-11 RX ORDER — BEVACIZUMAB 100 MG/4ML
INJECTION, SOLUTION INTRAVENOUS SEE ADMIN INSTRUCTIONS
COMMUNITY
Start: 2020-04-30 | End: 2020-11-06

## 2020-05-11 RX ORDER — METOLAZONE 10 MG/1
10 TABLET ORAL DAILY
COMMUNITY
Start: 2020-04-30 | End: 2020-11-25 | Stop reason: HOSPADM

## 2020-05-12 ENCOUNTER — TELEMEDICINE (OUTPATIENT)
Dept: NEUROLOGY | Facility: CLINIC | Age: 35
End: 2020-05-12
Payer: MEDICARE

## 2020-05-12 ENCOUNTER — TELEPHONE (OUTPATIENT)
Dept: NEUROLOGY | Facility: CLINIC | Age: 35
End: 2020-05-12

## 2020-05-12 DIAGNOSIS — R56.9 PROVOKED SEIZURE (HCC): Primary | ICD-10-CM

## 2020-05-12 PROCEDURE — 99213 OFFICE O/P EST LOW 20 MIN: CPT | Performed by: PHYSICIAN ASSISTANT

## 2020-05-12 RX ORDER — DIVALPROEX SODIUM 500 MG/1
500 TABLET, EXTENDED RELEASE ORAL DAILY
Qty: 30 TABLET | Refills: 5
Start: 2020-05-12 | End: 2020-07-23

## 2020-05-15 ENCOUNTER — TELEPHONE (OUTPATIENT)
Dept: NEUROLOGY | Facility: CLINIC | Age: 35
End: 2020-05-15

## 2020-05-21 ENCOUNTER — TELEPHONE (OUTPATIENT)
Dept: NEUROLOGY | Facility: CLINIC | Age: 35
End: 2020-05-21

## 2020-05-25 DIAGNOSIS — Z99.2 END-STAGE RENAL DISEASE ON PERITONEAL DIALYSIS (HCC): Primary | ICD-10-CM

## 2020-05-25 DIAGNOSIS — N18.6 END-STAGE RENAL DISEASE ON PERITONEAL DIALYSIS (HCC): Primary | ICD-10-CM

## 2020-05-26 RX ORDER — GENTAMICIN SULFATE 1 MG/G
CREAM TOPICAL
Qty: 30 G | Refills: 2 | Status: SHIPPED | OUTPATIENT
Start: 2020-05-26 | End: 2020-11-25 | Stop reason: HOSPADM

## 2020-06-04 ENCOUNTER — TELEPHONE (OUTPATIENT)
Dept: NEUROLOGY | Facility: CLINIC | Age: 35
End: 2020-06-04

## 2020-06-16 ENCOUNTER — OFFICE VISIT (OUTPATIENT)
Dept: GASTROENTEROLOGY | Facility: AMBULARY SURGERY CENTER | Age: 35
End: 2020-06-16
Payer: MEDICARE

## 2020-06-16 VITALS
BODY MASS INDEX: 32.28 KG/M2 | WEIGHT: 230.6 LBS | HEIGHT: 71 IN | HEART RATE: 80 BPM | DIASTOLIC BLOOD PRESSURE: 80 MMHG | TEMPERATURE: 95.8 F | SYSTOLIC BLOOD PRESSURE: 168 MMHG | RESPIRATION RATE: 18 BRPM

## 2020-06-16 DIAGNOSIS — E11.43 GASTROPARESIS DIABETICORUM (HCC): Primary | ICD-10-CM

## 2020-06-16 DIAGNOSIS — K20.90 ESOPHAGITIS: ICD-10-CM

## 2020-06-16 DIAGNOSIS — K31.84 GASTROPARESIS DIABETICORUM (HCC): Primary | ICD-10-CM

## 2020-06-16 PROCEDURE — 3008F BODY MASS INDEX DOCD: CPT | Performed by: INTERNAL MEDICINE

## 2020-06-16 PROCEDURE — 2026F EYE IMG VALID EVC RTNOPTHY: CPT | Performed by: INTERNAL MEDICINE

## 2020-06-16 PROCEDURE — 3066F NEPHROPATHY DOC TX: CPT | Performed by: INTERNAL MEDICINE

## 2020-06-16 PROCEDURE — 1111F DSCHRG MED/CURRENT MED MERGE: CPT | Performed by: INTERNAL MEDICINE

## 2020-06-16 PROCEDURE — 3044F HG A1C LEVEL LT 7.0%: CPT | Performed by: INTERNAL MEDICINE

## 2020-06-16 PROCEDURE — 99214 OFFICE O/P EST MOD 30 MIN: CPT | Performed by: INTERNAL MEDICINE

## 2020-06-16 PROCEDURE — 1036F TOBACCO NON-USER: CPT | Performed by: INTERNAL MEDICINE

## 2020-06-16 RX ORDER — FAMOTIDINE 20 MG/1
20 TABLET, FILM COATED ORAL 2 TIMES DAILY
Qty: 60 TABLET | Refills: 5 | Status: ON HOLD | OUTPATIENT
Start: 2020-06-16 | End: 2020-08-06 | Stop reason: SDUPTHER

## 2020-06-19 ENCOUNTER — APPOINTMENT (EMERGENCY)
Dept: RADIOLOGY | Facility: HOSPITAL | Age: 35
DRG: 919 | End: 2020-06-19
Payer: MEDICARE

## 2020-06-19 ENCOUNTER — HOSPITAL ENCOUNTER (INPATIENT)
Facility: HOSPITAL | Age: 35
LOS: 4 days | Discharge: HOME | DRG: 919 | End: 2020-06-23
Attending: EMERGENCY MEDICINE | Admitting: HOSPITALIST
Payer: MEDICARE

## 2020-06-19 ENCOUNTER — APPOINTMENT (EMERGENCY)
Dept: CT IMAGING | Facility: HOSPITAL | Age: 35
DRG: 919 | End: 2020-06-19
Payer: MEDICARE

## 2020-06-19 DIAGNOSIS — R91.1 PULMONARY NODULE: ICD-10-CM

## 2020-06-19 DIAGNOSIS — R11.2 NAUSEA AND VOMITING: ICD-10-CM

## 2020-06-19 DIAGNOSIS — A41.9 SEPSIS WITHOUT ACUTE ORGAN DYSFUNCTION, DUE TO UNSPECIFIED ORGANISM (HCC): ICD-10-CM

## 2020-06-19 DIAGNOSIS — I16.0 HYPERTENSIVE URGENCY: ICD-10-CM

## 2020-06-19 DIAGNOSIS — E66.9 OBESITY (BMI 30.0-34.9): ICD-10-CM

## 2020-06-19 DIAGNOSIS — K65.9 PERITONITIS (HCC): ICD-10-CM

## 2020-06-19 DIAGNOSIS — E10.9 TYPE 1 DIABETES MELLITUS (HCC): ICD-10-CM

## 2020-06-19 DIAGNOSIS — E10.21 TYPE 1 DIABETES MELLITUS WITH DIABETIC NEPHROPATHY, WITH LONG-TERM CURRENT USE OF INSULIN (HCC): Chronic | ICD-10-CM

## 2020-06-19 DIAGNOSIS — R73.9 HYPERGLYCEMIA: ICD-10-CM

## 2020-06-19 DIAGNOSIS — E11.10 DKA (DIABETIC KETOACIDOSES): ICD-10-CM

## 2020-06-19 DIAGNOSIS — K65.2 SBP (SPONTANEOUS BACTERIAL PERITONITIS) (HCC): Primary | ICD-10-CM

## 2020-06-19 LAB
ALBUMIN SERPL BCP-MCNC: 3.1 G/DL (ref 3.5–5)
ALP SERPL-CCNC: 85 U/L (ref 46–116)
ALT SERPL W P-5'-P-CCNC: 16 U/L (ref 12–78)
ANION GAP SERPL CALCULATED.3IONS-SCNC: 14 MMOL/L (ref 4–13)
APTT PPP: 32 SECONDS (ref 23–37)
AST SERPL W P-5'-P-CCNC: 18 U/L (ref 5–45)
BASE EX.OXY STD BLDV CALC-SCNC: 88.1 % (ref 60–80)
BASE EXCESS BLDV CALC-SCNC: -4.4 MMOL/L
BASOPHILS # BLD AUTO: 0.06 THOUSANDS/ΜL (ref 0–0.1)
BASOPHILS NFR BLD AUTO: 0 % (ref 0–1)
BETA-HYDROXYBUTYRATE: 5.3 MMOL/L
BILIRUB SERPL-MCNC: 0.68 MG/DL (ref 0.2–1)
BUN SERPL-MCNC: 51 MG/DL (ref 5–25)
CALCIUM SERPL-MCNC: 9.2 MG/DL (ref 8.3–10.1)
CHLORIDE SERPL-SCNC: 96 MMOL/L (ref 100–108)
CO2 SERPL-SCNC: 25 MMOL/L (ref 21–32)
CREAT SERPL-MCNC: 15.37 MG/DL (ref 0.6–1.3)
EOSINOPHIL # BLD AUTO: 5.07 THOUSAND/ΜL (ref 0–0.61)
EOSINOPHIL NFR BLD AUTO: 26 % (ref 0–6)
ERYTHROCYTE [DISTWIDTH] IN BLOOD BY AUTOMATED COUNT: 15.4 % (ref 11.6–15.1)
GFR SERPL CREATININE-BSD FRML MDRD: 4 ML/MIN/1.73SQ M
GLUCOSE SERPL-MCNC: 264 MG/DL (ref 65–140)
GLUCOSE SERPL-MCNC: 286 MG/DL (ref 65–140)
GLUCOSE SERPL-MCNC: 288 MG/DL (ref 65–140)
GLUCOSE SERPL-MCNC: 307 MG/DL (ref 65–140)
HCO3 BLDV-SCNC: 20.4 MMOL/L (ref 24–30)
HCT VFR BLD AUTO: 30.5 % (ref 36.5–49.3)
HGB BLD-MCNC: 9.9 G/DL (ref 12–17)
IMM GRANULOCYTES # BLD AUTO: 0.06 THOUSAND/UL (ref 0–0.2)
IMM GRANULOCYTES NFR BLD AUTO: 0 % (ref 0–2)
INR PPP: 1.12 (ref 0.84–1.19)
LACTATE SERPL-SCNC: 1.4 MMOL/L (ref 0.5–2)
LIPASE SERPL-CCNC: 45 U/L (ref 73–393)
LYMPHOCYTES # BLD AUTO: 1.12 THOUSANDS/ΜL (ref 0.6–4.47)
LYMPHOCYTES NFR BLD AUTO: 6 % (ref 14–44)
MCH RBC QN AUTO: 32.8 PG (ref 26.8–34.3)
MCHC RBC AUTO-ENTMCNC: 32.5 G/DL (ref 31.4–37.4)
MCV RBC AUTO: 101 FL (ref 82–98)
MONOCYTES # BLD AUTO: 0.67 THOUSAND/ΜL (ref 0.17–1.22)
MONOCYTES NFR BLD AUTO: 4 % (ref 4–12)
NEUTROPHILS # BLD AUTO: 12.19 THOUSANDS/ΜL (ref 1.85–7.62)
NEUTS SEG NFR BLD AUTO: 64 % (ref 43–75)
NRBC BLD AUTO-RTO: 0 /100 WBCS
O2 CT BLDV-SCNC: 12.7 ML/DL
PCO2 BLDV: 36.2 MM HG (ref 42–50)
PH BLDV: 7.37 [PH] (ref 7.3–7.4)
PLATELET # BLD AUTO: 255 THOUSANDS/UL (ref 149–390)
PMV BLD AUTO: 10.6 FL (ref 8.9–12.7)
PO2 BLDV: 62.1 MM HG (ref 35–45)
POTASSIUM SERPL-SCNC: 5.1 MMOL/L (ref 3.5–5.3)
PROT SERPL-MCNC: 6.4 G/DL (ref 6.4–8.2)
PROTHROMBIN TIME: 13.8 SECONDS (ref 11.6–14.5)
RBC # BLD AUTO: 3.02 MILLION/UL (ref 3.88–5.62)
SODIUM SERPL-SCNC: 135 MMOL/L (ref 136–145)
WBC # BLD AUTO: 19.17 THOUSAND/UL (ref 4.31–10.16)

## 2020-06-19 PROCEDURE — 87040 BLOOD CULTURE FOR BACTERIA: CPT | Performed by: EMERGENCY MEDICINE

## 2020-06-19 PROCEDURE — 87077 CULTURE AEROBIC IDENTIFY: CPT | Performed by: EMERGENCY MEDICINE

## 2020-06-19 PROCEDURE — 99285 EMERGENCY DEPT VISIT HI MDM: CPT

## 2020-06-19 PROCEDURE — 80053 COMPREHEN METABOLIC PANEL: CPT | Performed by: EMERGENCY MEDICINE

## 2020-06-19 PROCEDURE — 82948 REAGENT STRIP/BLOOD GLUCOSE: CPT

## 2020-06-19 PROCEDURE — 87147 CULTURE TYPE IMMUNOLOGIC: CPT | Performed by: EMERGENCY MEDICINE

## 2020-06-19 PROCEDURE — 74176 CT ABD & PELVIS W/O CONTRAST: CPT

## 2020-06-19 PROCEDURE — 85610 PROTHROMBIN TIME: CPT | Performed by: EMERGENCY MEDICINE

## 2020-06-19 PROCEDURE — 99285 EMERGENCY DEPT VISIT HI MDM: CPT | Performed by: EMERGENCY MEDICINE

## 2020-06-19 PROCEDURE — 83605 ASSAY OF LACTIC ACID: CPT | Performed by: EMERGENCY MEDICINE

## 2020-06-19 PROCEDURE — 87070 CULTURE OTHR SPECIMN AEROBIC: CPT | Performed by: EMERGENCY MEDICINE

## 2020-06-19 PROCEDURE — 82010 KETONE BODYS QUAN: CPT | Performed by: EMERGENCY MEDICINE

## 2020-06-19 PROCEDURE — 36415 COLL VENOUS BLD VENIPUNCTURE: CPT | Performed by: EMERGENCY MEDICINE

## 2020-06-19 PROCEDURE — 96365 THER/PROPH/DIAG IV INF INIT: CPT

## 2020-06-19 PROCEDURE — 83690 ASSAY OF LIPASE: CPT | Performed by: EMERGENCY MEDICINE

## 2020-06-19 PROCEDURE — 82805 BLOOD GASES W/O2 SATURATION: CPT | Performed by: EMERGENCY MEDICINE

## 2020-06-19 PROCEDURE — 87186 SC STD MICRODIL/AGAR DIL: CPT | Performed by: EMERGENCY MEDICINE

## 2020-06-19 PROCEDURE — 87205 SMEAR GRAM STAIN: CPT | Performed by: EMERGENCY MEDICINE

## 2020-06-19 PROCEDURE — 85049 AUTOMATED PLATELET COUNT: CPT | Performed by: PHYSICIAN ASSISTANT

## 2020-06-19 PROCEDURE — 85025 COMPLETE CBC W/AUTO DIFF WBC: CPT | Performed by: EMERGENCY MEDICINE

## 2020-06-19 PROCEDURE — 85610 PROTHROMBIN TIME: CPT | Performed by: PHYSICIAN ASSISTANT

## 2020-06-19 PROCEDURE — 85730 THROMBOPLASTIN TIME PARTIAL: CPT | Performed by: EMERGENCY MEDICINE

## 2020-06-19 PROCEDURE — 71045 X-RAY EXAM CHEST 1 VIEW: CPT

## 2020-06-19 PROCEDURE — 99223 1ST HOSP IP/OBS HIGH 75: CPT | Performed by: PHYSICIAN ASSISTANT

## 2020-06-19 PROCEDURE — 96367 TX/PROPH/DG ADDL SEQ IV INF: CPT

## 2020-06-19 PROCEDURE — 96375 TX/PRO/DX INJ NEW DRUG ADDON: CPT

## 2020-06-19 PROCEDURE — 93005 ELECTROCARDIOGRAM TRACING: CPT

## 2020-06-19 PROCEDURE — 80164 ASSAY DIPROPYLACETIC ACD TOT: CPT | Performed by: EMERGENCY MEDICINE

## 2020-06-19 RX ORDER — HEPARIN SODIUM 5000 [USP'U]/ML
5000 INJECTION, SOLUTION INTRAVENOUS; SUBCUTANEOUS EVERY 8 HOURS SCHEDULED
Status: DISCONTINUED | OUTPATIENT
Start: 2020-06-20 | End: 2020-06-23 | Stop reason: HOSPADM

## 2020-06-19 RX ORDER — DIVALPROEX SODIUM 500 MG/1
500 TABLET, EXTENDED RELEASE ORAL DAILY
Status: DISCONTINUED | OUTPATIENT
Start: 2020-06-20 | End: 2020-06-23 | Stop reason: HOSPADM

## 2020-06-19 RX ORDER — LABETALOL 20 MG/4 ML (5 MG/ML) INTRAVENOUS SYRINGE
Status: COMPLETED
Start: 2020-06-19 | End: 2020-06-19

## 2020-06-19 RX ORDER — FAMOTIDINE 20 MG/1
20 TABLET, FILM COATED ORAL DAILY
Status: DISCONTINUED | OUTPATIENT
Start: 2020-06-20 | End: 2020-06-23 | Stop reason: HOSPADM

## 2020-06-19 RX ORDER — DOXAZOSIN MESYLATE 1 MG/1
4 TABLET ORAL DAILY
Status: DISCONTINUED | OUTPATIENT
Start: 2020-06-20 | End: 2020-06-20

## 2020-06-19 RX ORDER — ESCITALOPRAM OXALATE 10 MG/1
10 TABLET ORAL DAILY
Status: DISCONTINUED | OUTPATIENT
Start: 2020-06-20 | End: 2020-06-23 | Stop reason: HOSPADM

## 2020-06-19 RX ORDER — ATORVASTATIN CALCIUM 40 MG/1
40 TABLET, FILM COATED ORAL
Status: DISCONTINUED | OUTPATIENT
Start: 2020-06-20 | End: 2020-06-23 | Stop reason: HOSPADM

## 2020-06-19 RX ORDER — ONDANSETRON 2 MG/ML
4 INJECTION INTRAMUSCULAR; INTRAVENOUS ONCE
Status: COMPLETED | OUTPATIENT
Start: 2020-06-19 | End: 2020-06-19

## 2020-06-19 RX ORDER — LISINOPRIL 20 MG/1
40 TABLET ORAL DAILY
Status: DISCONTINUED | OUTPATIENT
Start: 2020-06-20 | End: 2020-06-20

## 2020-06-19 RX ORDER — ASPIRIN 81 MG/1
81 TABLET, CHEWABLE ORAL DAILY
Status: DISCONTINUED | OUTPATIENT
Start: 2020-06-20 | End: 2020-06-23 | Stop reason: HOSPADM

## 2020-06-19 RX ORDER — LABETALOL 20 MG/4 ML (5 MG/ML) INTRAVENOUS SYRINGE
10 ONCE
Status: COMPLETED | OUTPATIENT
Start: 2020-06-19 | End: 2020-06-19

## 2020-06-19 RX ORDER — SODIUM CHLORIDE 9 MG/ML
100 INJECTION, SOLUTION INTRAVENOUS CONTINUOUS
Status: DISCONTINUED | OUTPATIENT
Start: 2020-06-19 | End: 2020-06-20

## 2020-06-19 RX ORDER — GABAPENTIN 100 MG/1
100 CAPSULE ORAL
Status: DISCONTINUED | OUTPATIENT
Start: 2020-06-20 | End: 2020-06-23 | Stop reason: HOSPADM

## 2020-06-19 RX ORDER — LORAZEPAM 2 MG/ML
0.5 INJECTION INTRAMUSCULAR ONCE
Status: COMPLETED | OUTPATIENT
Start: 2020-06-19 | End: 2020-06-19

## 2020-06-19 RX ORDER — TORSEMIDE 100 MG/1
100 TABLET ORAL 2 TIMES DAILY
Status: DISCONTINUED | OUTPATIENT
Start: 2020-06-20 | End: 2020-06-23 | Stop reason: HOSPADM

## 2020-06-19 RX ORDER — HYDRALAZINE HYDROCHLORIDE 20 MG/ML
5 INJECTION INTRAMUSCULAR; INTRAVENOUS ONCE
Status: COMPLETED | OUTPATIENT
Start: 2020-06-19 | End: 2020-06-19

## 2020-06-19 RX ORDER — MELATONIN
2000 DAILY
Status: DISCONTINUED | OUTPATIENT
Start: 2020-06-20 | End: 2020-06-23 | Stop reason: HOSPADM

## 2020-06-19 RX ORDER — CINACALCET 30 MG/1
60 TABLET, FILM COATED ORAL DAILY
Status: DISCONTINUED | OUTPATIENT
Start: 2020-06-20 | End: 2020-06-23 | Stop reason: HOSPADM

## 2020-06-19 RX ORDER — HYDRALAZINE HYDROCHLORIDE 25 MG/1
100 TABLET, FILM COATED ORAL 2 TIMES DAILY
Status: DISCONTINUED | OUTPATIENT
Start: 2020-06-20 | End: 2020-06-23 | Stop reason: HOSPADM

## 2020-06-19 RX ORDER — ONDANSETRON 4 MG/1
4 TABLET, ORALLY DISINTEGRATING ORAL EVERY 8 HOURS PRN
Status: DISCONTINUED | OUTPATIENT
Start: 2020-06-19 | End: 2020-06-20

## 2020-06-19 RX ORDER — NIFEDIPINE 30 MG/1
60 TABLET, EXTENDED RELEASE ORAL 2 TIMES DAILY
Status: DISCONTINUED | OUTPATIENT
Start: 2020-06-20 | End: 2020-06-23 | Stop reason: HOSPADM

## 2020-06-19 RX ORDER — HYDRALAZINE HYDROCHLORIDE 20 MG/ML
10 INJECTION INTRAMUSCULAR; INTRAVENOUS ONCE
Status: COMPLETED | OUTPATIENT
Start: 2020-06-19 | End: 2020-06-19

## 2020-06-19 RX ORDER — LABETALOL 100 MG/1
300 TABLET, FILM COATED ORAL EVERY 12 HOURS SCHEDULED
Status: DISCONTINUED | OUTPATIENT
Start: 2020-06-20 | End: 2020-06-23 | Stop reason: HOSPADM

## 2020-06-19 RX ORDER — METOLAZONE 5 MG/1
10 TABLET ORAL DAILY
Status: DISCONTINUED | OUTPATIENT
Start: 2020-06-20 | End: 2020-06-20

## 2020-06-19 RX ORDER — ACETAMINOPHEN 325 MG/1
650 TABLET ORAL EVERY 6 HOURS PRN
Status: DISCONTINUED | OUTPATIENT
Start: 2020-06-19 | End: 2020-06-23 | Stop reason: HOSPADM

## 2020-06-19 RX ADMIN — ONDANSETRON 4 MG: 2 INJECTION INTRAMUSCULAR; INTRAVENOUS at 20:19

## 2020-06-19 RX ADMIN — ONDANSETRON 4 MG: 2 INJECTION INTRAMUSCULAR; INTRAVENOUS at 21:11

## 2020-06-19 RX ADMIN — LORAZEPAM 0.5 MG: 2 INJECTION INTRAMUSCULAR; INTRAVENOUS at 21:43

## 2020-06-19 RX ADMIN — SODIUM CHLORIDE 500 ML: 0.9 INJECTION, SOLUTION INTRAVENOUS at 20:19

## 2020-06-19 RX ADMIN — LABETALOL 20 MG/4 ML (5 MG/ML) INTRAVENOUS SYRINGE 10 MG: at 22:40

## 2020-06-19 RX ADMIN — CEFEPIME HYDROCHLORIDE 2000 MG: 2 INJECTION, POWDER, FOR SOLUTION INTRAVENOUS at 20:33

## 2020-06-19 RX ADMIN — VANCOMYCIN HYDROCHLORIDE 1500 MG: 5 INJECTION, POWDER, LYOPHILIZED, FOR SOLUTION INTRAVENOUS at 21:23

## 2020-06-19 RX ADMIN — FAMOTIDINE 20 MG: 10 INJECTION, SOLUTION INTRAVENOUS at 20:33

## 2020-06-19 RX ADMIN — SODIUM CHLORIDE 5 UNITS/HR: 9 INJECTION, SOLUTION INTRAVENOUS at 21:32

## 2020-06-19 RX ADMIN — SODIUM CHLORIDE 100 ML/HR: 0.9 INJECTION, SOLUTION INTRAVENOUS at 22:30

## 2020-06-19 RX ADMIN — HYDRALAZINE HYDROCHLORIDE 5 MG: 20 INJECTION INTRAMUSCULAR; INTRAVENOUS at 21:38

## 2020-06-19 RX ADMIN — HYDRALAZINE HYDROCHLORIDE 5 MG: 20 INJECTION INTRAMUSCULAR; INTRAVENOUS at 21:11

## 2020-06-19 RX ADMIN — HYDRALAZINE HYDROCHLORIDE 10 MG: 20 INJECTION INTRAMUSCULAR; INTRAVENOUS at 23:27

## 2020-06-20 PROBLEM — Z87.898 HISTORY OF SEIZURES: Status: ACTIVE | Noted: 2020-06-20

## 2020-06-20 LAB
ALBUMIN SERPL BCP-MCNC: 2.4 G/DL (ref 3.5–5)
ALBUMIN SERPL BCP-MCNC: 2.6 G/DL (ref 3.5–5)
ALP SERPL-CCNC: 69 U/L (ref 46–116)
ALP SERPL-CCNC: 74 U/L (ref 46–116)
ALT SERPL W P-5'-P-CCNC: 13 U/L (ref 12–78)
ALT SERPL W P-5'-P-CCNC: 14 U/L (ref 12–78)
ANION GAP SERPL CALCULATED.3IONS-SCNC: 15 MMOL/L (ref 4–13)
ANION GAP SERPL CALCULATED.3IONS-SCNC: 8 MMOL/L (ref 4–13)
ANION GAP SERPL CALCULATED.3IONS-SCNC: 8 MMOL/L (ref 4–13)
ANION GAP SERPL CALCULATED.3IONS-SCNC: 9 MMOL/L (ref 4–13)
AST SERPL W P-5'-P-CCNC: 15 U/L (ref 5–45)
AST SERPL W P-5'-P-CCNC: 21 U/L (ref 5–45)
BASOPHILS # BLD AUTO: 0.06 THOUSANDS/ΜL (ref 0–0.1)
BASOPHILS NFR BLD AUTO: 0 % (ref 0–1)
BILIRUB SERPL-MCNC: 0.46 MG/DL (ref 0.2–1)
BILIRUB SERPL-MCNC: 0.6 MG/DL (ref 0.2–1)
BUN SERPL-MCNC: 49 MG/DL (ref 5–25)
BUN SERPL-MCNC: 50 MG/DL (ref 5–25)
BUN SERPL-MCNC: 51 MG/DL (ref 5–25)
BUN SERPL-MCNC: 51 MG/DL (ref 5–25)
CALCIUM SERPL-MCNC: 8.8 MG/DL (ref 8.3–10.1)
CALCIUM SERPL-MCNC: 8.9 MG/DL (ref 8.3–10.1)
CALCIUM SERPL-MCNC: 8.9 MG/DL (ref 8.3–10.1)
CALCIUM SERPL-MCNC: 9 MG/DL (ref 8.3–10.1)
CHLORIDE SERPL-SCNC: 100 MMOL/L (ref 100–108)
CHLORIDE SERPL-SCNC: 102 MMOL/L (ref 100–108)
CHLORIDE SERPL-SCNC: 98 MMOL/L (ref 100–108)
CHLORIDE SERPL-SCNC: 99 MMOL/L (ref 100–108)
CO2 SERPL-SCNC: 23 MMOL/L (ref 21–32)
CO2 SERPL-SCNC: 28 MMOL/L (ref 21–32)
CO2 SERPL-SCNC: 28 MMOL/L (ref 21–32)
CO2 SERPL-SCNC: 29 MMOL/L (ref 21–32)
CREAT SERPL-MCNC: 13.9 MG/DL (ref 0.6–1.3)
CREAT SERPL-MCNC: 14.04 MG/DL (ref 0.6–1.3)
CREAT SERPL-MCNC: 14.07 MG/DL (ref 0.6–1.3)
CREAT SERPL-MCNC: 14.76 MG/DL (ref 0.6–1.3)
EOSINOPHIL # BLD AUTO: 0.65 THOUSAND/ΜL (ref 0–0.61)
EOSINOPHIL NFR BLD AUTO: 5 % (ref 0–6)
ERYTHROCYTE [DISTWIDTH] IN BLOOD BY AUTOMATED COUNT: 15.2 % (ref 11.6–15.1)
GFR SERPL CREATININE-BSD FRML MDRD: 4 ML/MIN/1.73SQ M
GLUCOSE SERPL-MCNC: 101 MG/DL (ref 65–140)
GLUCOSE SERPL-MCNC: 108 MG/DL (ref 65–140)
GLUCOSE SERPL-MCNC: 109 MG/DL (ref 65–140)
GLUCOSE SERPL-MCNC: 139 MG/DL (ref 65–140)
GLUCOSE SERPL-MCNC: 140 MG/DL (ref 65–140)
GLUCOSE SERPL-MCNC: 149 MG/DL (ref 65–140)
GLUCOSE SERPL-MCNC: 152 MG/DL (ref 65–140)
GLUCOSE SERPL-MCNC: 174 MG/DL (ref 65–140)
GLUCOSE SERPL-MCNC: 186 MG/DL (ref 65–140)
GLUCOSE SERPL-MCNC: 203 MG/DL (ref 65–140)
GLUCOSE SERPL-MCNC: 207 MG/DL (ref 65–140)
GLUCOSE SERPL-MCNC: 209 MG/DL (ref 65–140)
GLUCOSE SERPL-MCNC: 209 MG/DL (ref 65–140)
GLUCOSE SERPL-MCNC: 222 MG/DL (ref 65–140)
GLUCOSE SERPL-MCNC: 236 MG/DL (ref 65–140)
GLUCOSE SERPL-MCNC: 241 MG/DL (ref 65–140)
GLUCOSE SERPL-MCNC: 258 MG/DL (ref 65–140)
GLUCOSE SERPL-MCNC: 259 MG/DL (ref 65–140)
GLUCOSE SERPL-MCNC: 271 MG/DL (ref 65–140)
GLUCOSE SERPL-MCNC: 64 MG/DL (ref 65–140)
GLUCOSE SERPL-MCNC: 96 MG/DL (ref 65–140)
HCT VFR BLD AUTO: 29.2 % (ref 36.5–49.3)
HGB BLD-MCNC: 9.3 G/DL (ref 12–17)
IMM GRANULOCYTES # BLD AUTO: 0.09 THOUSAND/UL (ref 0–0.2)
IMM GRANULOCYTES NFR BLD AUTO: 1 % (ref 0–2)
INR PPP: 1.19 (ref 0.84–1.19)
LYMPHOCYTES # BLD AUTO: 0.99 THOUSANDS/ΜL (ref 0.6–4.47)
LYMPHOCYTES NFR BLD AUTO: 7 % (ref 14–44)
MAGNESIUM SERPL-MCNC: 1.9 MG/DL (ref 1.6–2.6)
MAGNESIUM SERPL-MCNC: 2 MG/DL (ref 1.6–2.6)
MAGNESIUM SERPL-MCNC: 2 MG/DL (ref 1.6–2.6)
MAGNESIUM SERPL-MCNC: 2.2 MG/DL (ref 1.6–2.6)
MCH RBC QN AUTO: 32.3 PG (ref 26.8–34.3)
MCHC RBC AUTO-ENTMCNC: 31.8 G/DL (ref 31.4–37.4)
MCV RBC AUTO: 101 FL (ref 82–98)
MONOCYTES # BLD AUTO: 0.49 THOUSAND/ΜL (ref 0.17–1.22)
MONOCYTES NFR BLD AUTO: 3 % (ref 4–12)
NEUTROPHILS # BLD AUTO: 12.26 THOUSANDS/ΜL (ref 1.85–7.62)
NEUTS SEG NFR BLD AUTO: 84 % (ref 43–75)
NRBC BLD AUTO-RTO: 0 /100 WBCS
PHOSPHATE SERPL-MCNC: 5.5 MG/DL (ref 2.7–4.5)
PHOSPHATE SERPL-MCNC: 5.5 MG/DL (ref 2.7–4.5)
PHOSPHATE SERPL-MCNC: 5.6 MG/DL (ref 2.7–4.5)
PHOSPHATE SERPL-MCNC: 6 MG/DL (ref 2.7–4.5)
PLATELET # BLD AUTO: 225 THOUSANDS/UL (ref 149–390)
PLATELET # BLD AUTO: 239 THOUSANDS/UL (ref 149–390)
PMV BLD AUTO: 10.6 FL (ref 8.9–12.7)
PMV BLD AUTO: 9.9 FL (ref 8.9–12.7)
POTASSIUM SERPL-SCNC: 3.9 MMOL/L (ref 3.5–5.3)
POTASSIUM SERPL-SCNC: 4 MMOL/L (ref 3.5–5.3)
POTASSIUM SERPL-SCNC: 4.3 MMOL/L (ref 3.5–5.3)
POTASSIUM SERPL-SCNC: 4.9 MMOL/L (ref 3.5–5.3)
PROT SERPL-MCNC: 5.7 G/DL (ref 6.4–8.2)
PROT SERPL-MCNC: 5.8 G/DL (ref 6.4–8.2)
PROTHROMBIN TIME: 14.5 SECONDS (ref 11.6–14.5)
RBC # BLD AUTO: 2.88 MILLION/UL (ref 3.88–5.62)
SODIUM SERPL-SCNC: 135 MMOL/L (ref 136–145)
SODIUM SERPL-SCNC: 136 MMOL/L (ref 136–145)
SODIUM SERPL-SCNC: 137 MMOL/L (ref 136–145)
SODIUM SERPL-SCNC: 139 MMOL/L (ref 136–145)
VALPROATE SERPL-MCNC: 12 UG/ML (ref 50–100)
VANCOMYCIN SERPL-MCNC: 20.8 UG/ML
WBC # BLD AUTO: 14.54 THOUSAND/UL (ref 4.31–10.16)

## 2020-06-20 PROCEDURE — 99232 SBSQ HOSP IP/OBS MODERATE 35: CPT | Performed by: INTERNAL MEDICINE

## 2020-06-20 PROCEDURE — 83735 ASSAY OF MAGNESIUM: CPT | Performed by: INTERNAL MEDICINE

## 2020-06-20 PROCEDURE — 80048 BASIC METABOLIC PNL TOTAL CA: CPT | Performed by: INTERNAL MEDICINE

## 2020-06-20 PROCEDURE — 83735 ASSAY OF MAGNESIUM: CPT | Performed by: PHYSICIAN ASSISTANT

## 2020-06-20 PROCEDURE — 84100 ASSAY OF PHOSPHORUS: CPT | Performed by: PHYSICIAN ASSISTANT

## 2020-06-20 PROCEDURE — 85025 COMPLETE CBC W/AUTO DIFF WBC: CPT | Performed by: PHYSICIAN ASSISTANT

## 2020-06-20 PROCEDURE — 99223 1ST HOSP IP/OBS HIGH 75: CPT | Performed by: INTERNAL MEDICINE

## 2020-06-20 PROCEDURE — 80053 COMPREHEN METABOLIC PANEL: CPT | Performed by: PHYSICIAN ASSISTANT

## 2020-06-20 PROCEDURE — 84100 ASSAY OF PHOSPHORUS: CPT | Performed by: INTERNAL MEDICINE

## 2020-06-20 PROCEDURE — 80202 ASSAY OF VANCOMYCIN: CPT | Performed by: INTERNAL MEDICINE

## 2020-06-20 PROCEDURE — 82948 REAGENT STRIP/BLOOD GLUCOSE: CPT

## 2020-06-20 PROCEDURE — 80053 COMPREHEN METABOLIC PANEL: CPT | Performed by: INTERNAL MEDICINE

## 2020-06-20 RX ORDER — SODIUM CHLORIDE 9 MG/ML
2000 INJECTION, SOLUTION INTRAVENOUS CONTINUOUS
Status: DISCONTINUED | OUTPATIENT
Start: 2020-06-20 | End: 2020-06-20

## 2020-06-20 RX ORDER — POTASSIUM CHLORIDE 20 MEQ/1
20 TABLET, EXTENDED RELEASE ORAL ONCE
Status: COMPLETED | OUTPATIENT
Start: 2020-06-20 | End: 2020-06-20

## 2020-06-20 RX ORDER — INSULIN GLARGINE 100 [IU]/ML
6 INJECTION, SOLUTION SUBCUTANEOUS
Status: DISCONTINUED | OUTPATIENT
Start: 2020-06-20 | End: 2020-06-21

## 2020-06-20 RX ORDER — DEXTROSE MONOHYDRATE 25 G/50ML
INJECTION, SOLUTION INTRAVENOUS
Status: COMPLETED
Start: 2020-06-20 | End: 2020-06-20

## 2020-06-20 RX ORDER — SODIUM CHLORIDE 9 MG/ML
500 INJECTION, SOLUTION INTRAVENOUS CONTINUOUS
Status: DISCONTINUED | OUTPATIENT
Start: 2020-06-20 | End: 2020-06-20

## 2020-06-20 RX ORDER — GENTAMICIN SULFATE 1 MG/G
OINTMENT TOPICAL DAILY
Status: DISCONTINUED | OUTPATIENT
Start: 2020-06-20 | End: 2020-06-23 | Stop reason: HOSPADM

## 2020-06-20 RX ORDER — DEXTROSE MONOHYDRATE 50 MG/ML
125 INJECTION, SOLUTION INTRAVENOUS CONTINUOUS
Status: DISCONTINUED | OUTPATIENT
Start: 2020-06-20 | End: 2020-06-20

## 2020-06-20 RX ORDER — HYDROMORPHONE HCL/PF 1 MG/ML
0.2 SYRINGE (ML) INJECTION EVERY 4 HOURS PRN
Status: DISCONTINUED | OUTPATIENT
Start: 2020-06-20 | End: 2020-06-23 | Stop reason: HOSPADM

## 2020-06-20 RX ORDER — DEXTROSE AND SODIUM CHLORIDE 5; .9 G/100ML; G/100ML
250 INJECTION, SOLUTION INTRAVENOUS CONTINUOUS
Status: DISCONTINUED | OUTPATIENT
Start: 2020-06-20 | End: 2020-06-21

## 2020-06-20 RX ORDER — LISINOPRIL 20 MG/1
40 TABLET ORAL DAILY
Status: DISCONTINUED | OUTPATIENT
Start: 2020-06-20 | End: 2020-06-23 | Stop reason: HOSPADM

## 2020-06-20 RX ORDER — HYDRALAZINE HYDROCHLORIDE 20 MG/ML
10 INJECTION INTRAMUSCULAR; INTRAVENOUS EVERY 6 HOURS PRN
Status: DISCONTINUED | OUTPATIENT
Start: 2020-06-20 | End: 2020-06-23 | Stop reason: HOSPADM

## 2020-06-20 RX ORDER — CLONIDINE HYDROCHLORIDE 0.1 MG/1
0.1 TABLET ORAL EVERY 12 HOURS SCHEDULED
Status: DISCONTINUED | OUTPATIENT
Start: 2020-06-20 | End: 2020-06-23 | Stop reason: HOSPADM

## 2020-06-20 RX ORDER — ONDANSETRON 2 MG/ML
4 INJECTION INTRAMUSCULAR; INTRAVENOUS EVERY 4 HOURS PRN
Status: DISCONTINUED | OUTPATIENT
Start: 2020-06-20 | End: 2020-06-23 | Stop reason: HOSPADM

## 2020-06-20 RX ORDER — VANCOMYCIN HYDROCHLORIDE 1 G/200ML
12.5 INJECTION, SOLUTION INTRAVENOUS DAILY PRN
Status: DISCONTINUED | OUTPATIENT
Start: 2020-06-20 | End: 2020-06-23 | Stop reason: HOSPADM

## 2020-06-20 RX ORDER — SODIUM CHLORIDE 9 MG/ML
250 INJECTION, SOLUTION INTRAVENOUS CONTINUOUS
Status: DISCONTINUED | OUTPATIENT
Start: 2020-06-20 | End: 2020-06-20

## 2020-06-20 RX ADMIN — ONDANSETRON 4 MG: 2 INJECTION INTRAMUSCULAR; INTRAVENOUS at 04:42

## 2020-06-20 RX ADMIN — MAGNESIUM OXIDE 800 MG: 400 TABLET ORAL at 19:37

## 2020-06-20 RX ADMIN — GABAPENTIN 100 MG: 100 CAPSULE ORAL at 01:45

## 2020-06-20 RX ADMIN — ATORVASTATIN CALCIUM 40 MG: 40 TABLET, FILM COATED ORAL at 00:16

## 2020-06-20 RX ADMIN — LABETALOL HCL 300 MG: 200 TABLET, FILM COATED ORAL at 21:53

## 2020-06-20 RX ADMIN — LISINOPRIL 40 MG: 20 TABLET ORAL at 09:00

## 2020-06-20 RX ADMIN — POTASSIUM CHLORIDE 20 MEQ: 1500 TABLET, EXTENDED RELEASE ORAL at 23:28

## 2020-06-20 RX ADMIN — DIVALPROEX SODIUM 500 MG: 500 TABLET, FILM COATED, EXTENDED RELEASE ORAL at 09:06

## 2020-06-20 RX ADMIN — HYDROMORPHONE HYDROCHLORIDE 0.2 MG: 1 INJECTION, SOLUTION INTRAMUSCULAR; INTRAVENOUS; SUBCUTANEOUS at 09:00

## 2020-06-20 RX ADMIN — ASPIRIN 81 MG 81 MG: 81 TABLET ORAL at 08:59

## 2020-06-20 RX ADMIN — CLONIDINE HYDROCHLORIDE 0.1 MG: 0.1 TABLET ORAL at 09:00

## 2020-06-20 RX ADMIN — HEPARIN SODIUM 5000 UNITS: 5000 INJECTION INTRAVENOUS; SUBCUTANEOUS at 21:52

## 2020-06-20 RX ADMIN — LABETALOL HCL 300 MG: 200 TABLET, FILM COATED ORAL at 00:16

## 2020-06-20 RX ADMIN — ACETAMINOPHEN 650 MG: 325 TABLET, FILM COATED ORAL at 00:18

## 2020-06-20 RX ADMIN — HYDRALAZINE HYDROCHLORIDE 100 MG: 25 TABLET ORAL at 21:53

## 2020-06-20 RX ADMIN — TORSEMIDE 100 MG: 100 TABLET ORAL at 09:06

## 2020-06-20 RX ADMIN — FAMOTIDINE 20 MG: 20 TABLET, FILM COATED ORAL at 08:59

## 2020-06-20 RX ADMIN — DEXTROSE 125 ML/HR: 5 SOLUTION INTRAVENOUS at 10:01

## 2020-06-20 RX ADMIN — CINACALCET 60 MG: 30 TABLET, FILM COATED ORAL at 09:06

## 2020-06-20 RX ADMIN — LABETALOL HCL 300 MG: 200 TABLET, FILM COATED ORAL at 08:59

## 2020-06-20 RX ADMIN — DEXTROSE MONOHYDRATE 50 ML: 500 INJECTION PARENTERAL at 10:01

## 2020-06-20 RX ADMIN — INSULIN GLARGINE 6 UNITS: 100 INJECTION, SOLUTION SUBCUTANEOUS at 21:52

## 2020-06-20 RX ADMIN — ONDANSETRON 4 MG: 2 INJECTION INTRAMUSCULAR; INTRAVENOUS at 09:15

## 2020-06-20 RX ADMIN — MELATONIN 2000 UNITS: at 09:00

## 2020-06-20 RX ADMIN — DEXTROSE AND SODIUM CHLORIDE 250 ML/HR: 5; .9 INJECTION, SOLUTION INTRAVENOUS at 22:56

## 2020-06-20 RX ADMIN — ONDANSETRON 4 MG: 2 INJECTION INTRAMUSCULAR; INTRAVENOUS at 13:25

## 2020-06-20 RX ADMIN — ESCITALOPRAM OXALATE 10 MG: 10 TABLET ORAL at 09:04

## 2020-06-20 RX ADMIN — Medication 800 MG: at 23:28

## 2020-06-20 RX ADMIN — HEPARIN SODIUM 5000 UNITS: 5000 INJECTION INTRAVENOUS; SUBCUTANEOUS at 16:30

## 2020-06-20 RX ADMIN — POTASSIUM CHLORIDE 20 MEQ: 1500 TABLET, EXTENDED RELEASE ORAL at 19:37

## 2020-06-20 RX ADMIN — VANCOMYCIN HYDROCHLORIDE 250 MG: 500 INJECTION, POWDER, LYOPHILIZED, FOR SOLUTION INTRAVENOUS at 01:34

## 2020-06-20 RX ADMIN — NIFEDIPINE 60 MG: 30 TABLET, FILM COATED, EXTENDED RELEASE ORAL at 08:59

## 2020-06-20 RX ADMIN — CLONIDINE HYDROCHLORIDE 0.1 MG: 0.1 TABLET ORAL at 21:55

## 2020-06-20 RX ADMIN — HYDRALAZINE HYDROCHLORIDE 100 MG: 25 TABLET ORAL at 06:04

## 2020-06-20 RX ADMIN — ATORVASTATIN CALCIUM 40 MG: 40 TABLET, FILM COATED ORAL at 21:52

## 2020-06-20 RX ADMIN — GABAPENTIN 100 MG: 100 CAPSULE ORAL at 21:53

## 2020-06-20 RX ADMIN — DEXTROSE AND SODIUM CHLORIDE 250 ML/HR: 5; .9 INJECTION, SOLUTION INTRAVENOUS at 14:54

## 2020-06-20 RX ADMIN — SODIUM CHLORIDE 100 ML/HR: 0.9 INJECTION, SOLUTION INTRAVENOUS at 09:02

## 2020-06-20 RX ADMIN — HEPARIN SODIUM 5000 UNITS: 5000 INJECTION INTRAVENOUS; SUBCUTANEOUS at 06:04

## 2020-06-21 LAB
ALBUMIN SERPL BCP-MCNC: 2.3 G/DL (ref 3.5–5)
ALP SERPL-CCNC: 64 U/L (ref 46–116)
ALT SERPL W P-5'-P-CCNC: 12 U/L (ref 12–78)
ANION GAP SERPL CALCULATED.3IONS-SCNC: 11 MMOL/L (ref 4–13)
ANION GAP SERPL CALCULATED.3IONS-SCNC: 7 MMOL/L (ref 4–13)
ANION GAP SERPL CALCULATED.3IONS-SCNC: 8 MMOL/L (ref 4–13)
APPEARANCE FLD: CLEAR
AST SERPL W P-5'-P-CCNC: 22 U/L (ref 5–45)
BILIRUB SERPL-MCNC: 0.53 MG/DL (ref 0.2–1)
BUN SERPL-MCNC: 46 MG/DL (ref 5–25)
CALCIUM SERPL-MCNC: 9 MG/DL (ref 8.3–10.1)
CALCIUM SERPL-MCNC: 9 MG/DL (ref 8.3–10.1)
CALCIUM SERPL-MCNC: 9.1 MG/DL (ref 8.3–10.1)
CHLORIDE SERPL-SCNC: 100 MMOL/L (ref 100–108)
CHLORIDE SERPL-SCNC: 98 MMOL/L (ref 100–108)
CHLORIDE SERPL-SCNC: 99 MMOL/L (ref 100–108)
CO2 SERPL-SCNC: 25 MMOL/L (ref 21–32)
CO2 SERPL-SCNC: 27 MMOL/L (ref 21–32)
CO2 SERPL-SCNC: 27 MMOL/L (ref 21–32)
COLOR FLD: COLORLESS
CREAT SERPL-MCNC: 13.15 MG/DL (ref 0.6–1.3)
CREAT SERPL-MCNC: 13.22 MG/DL (ref 0.6–1.3)
CREAT SERPL-MCNC: 13.34 MG/DL (ref 0.6–1.3)
EOSINOPHIL NFR FLD MANUAL: 2 %
ERYTHROCYTE [DISTWIDTH] IN BLOOD BY AUTOMATED COUNT: 15.1 % (ref 11.6–15.1)
EST. AVERAGE GLUCOSE BLD GHB EST-MCNC: 148 MG/DL
GFR SERPL CREATININE-BSD FRML MDRD: 4 ML/MIN/1.73SQ M
GLUCOSE SERPL-MCNC: 142 MG/DL (ref 65–140)
GLUCOSE SERPL-MCNC: 206 MG/DL (ref 65–140)
GLUCOSE SERPL-MCNC: 226 MG/DL (ref 65–140)
GLUCOSE SERPL-MCNC: 226 MG/DL (ref 65–140)
GLUCOSE SERPL-MCNC: 243 MG/DL (ref 65–140)
GLUCOSE SERPL-MCNC: 245 MG/DL (ref 65–140)
GLUCOSE SERPL-MCNC: 287 MG/DL (ref 65–140)
GLUCOSE SERPL-MCNC: 304 MG/DL (ref 65–140)
GLUCOSE SERPL-MCNC: 311 MG/DL (ref 65–140)
GLUCOSE SERPL-MCNC: 314 MG/DL (ref 65–140)
GLUCOSE SERPL-MCNC: 327 MG/DL (ref 65–140)
GLUCOSE SERPL-MCNC: 331 MG/DL (ref 65–140)
GLUCOSE SERPL-MCNC: 335 MG/DL (ref 65–140)
GLUCOSE SERPL-MCNC: 354 MG/DL (ref 65–140)
GLUCOSE SERPL-MCNC: 369 MG/DL (ref 65–140)
HBA1C MFR BLD: 6.8 %
HCT VFR BLD AUTO: 27.1 % (ref 36.5–49.3)
HGB BLD-MCNC: 8.8 G/DL (ref 12–17)
LYMPHOCYTES NFR BLD AUTO: 19 %
MAGNESIUM SERPL-MCNC: 1.9 MG/DL (ref 1.6–2.6)
MAGNESIUM SERPL-MCNC: 2.2 MG/DL (ref 1.6–2.6)
MCH RBC QN AUTO: 32.8 PG (ref 26.8–34.3)
MCHC RBC AUTO-ENTMCNC: 32.5 G/DL (ref 31.4–37.4)
MCV RBC AUTO: 101 FL (ref 82–98)
MONOCYTES NFR BLD AUTO: 18 %
NEUTS SEG NFR BLD AUTO: 61 %
PHOSPHATE SERPL-MCNC: 5.1 MG/DL (ref 2.7–4.5)
PHOSPHATE SERPL-MCNC: 5.4 MG/DL (ref 2.7–4.5)
PHOSPHATE SERPL-MCNC: 5.6 MG/DL (ref 2.7–4.5)
PLATELET # BLD AUTO: 217 THOUSANDS/UL (ref 149–390)
PMV BLD AUTO: 10.5 FL (ref 8.9–12.7)
POTASSIUM SERPL-SCNC: 4 MMOL/L (ref 3.5–5.3)
POTASSIUM SERPL-SCNC: 4.1 MMOL/L (ref 3.5–5.3)
POTASSIUM SERPL-SCNC: 4.8 MMOL/L (ref 3.5–5.3)
PROT SERPL-MCNC: 5.4 G/DL (ref 6.4–8.2)
RBC # BLD AUTO: 2.68 MILLION/UL (ref 3.88–5.62)
SITE: NORMAL
SODIUM SERPL-SCNC: 134 MMOL/L (ref 136–145)
TOTAL CELLS COUNTED SPEC: 100
VANCOMYCIN SERPL-MCNC: 19.6 UG/ML
WBC # BLD AUTO: 8.88 THOUSAND/UL (ref 4.31–10.16)
WBC # FLD MANUAL: 471 /UL

## 2020-06-21 PROCEDURE — 99232 SBSQ HOSP IP/OBS MODERATE 35: CPT | Performed by: INTERNAL MEDICINE

## 2020-06-21 PROCEDURE — 87070 CULTURE OTHR SPECIMN AEROBIC: CPT | Performed by: INTERNAL MEDICINE

## 2020-06-21 PROCEDURE — 87205 SMEAR GRAM STAIN: CPT | Performed by: INTERNAL MEDICINE

## 2020-06-21 PROCEDURE — 83036 HEMOGLOBIN GLYCOSYLATED A1C: CPT | Performed by: INTERNAL MEDICINE

## 2020-06-21 PROCEDURE — 80048 BASIC METABOLIC PNL TOTAL CA: CPT | Performed by: INTERNAL MEDICINE

## 2020-06-21 PROCEDURE — 85027 COMPLETE CBC AUTOMATED: CPT | Performed by: INTERNAL MEDICINE

## 2020-06-21 PROCEDURE — 99233 SBSQ HOSP IP/OBS HIGH 50: CPT | Performed by: INTERNAL MEDICINE

## 2020-06-21 PROCEDURE — 80202 ASSAY OF VANCOMYCIN: CPT | Performed by: INTERNAL MEDICINE

## 2020-06-21 PROCEDURE — 80053 COMPREHEN METABOLIC PANEL: CPT | Performed by: INTERNAL MEDICINE

## 2020-06-21 PROCEDURE — 89051 BODY FLUID CELL COUNT: CPT | Performed by: INTERNAL MEDICINE

## 2020-06-21 PROCEDURE — 83735 ASSAY OF MAGNESIUM: CPT | Performed by: INTERNAL MEDICINE

## 2020-06-21 PROCEDURE — 82948 REAGENT STRIP/BLOOD GLUCOSE: CPT

## 2020-06-21 PROCEDURE — 84100 ASSAY OF PHOSPHORUS: CPT | Performed by: INTERNAL MEDICINE

## 2020-06-21 RX ORDER — INSULIN GLARGINE 100 [IU]/ML
5 INJECTION, SOLUTION SUBCUTANEOUS EVERY MORNING
Status: DISCONTINUED | OUTPATIENT
Start: 2020-06-22 | End: 2020-06-23 | Stop reason: HOSPADM

## 2020-06-21 RX ORDER — INSULIN GLARGINE 100 [IU]/ML
10 INJECTION, SOLUTION SUBCUTANEOUS
Status: DISCONTINUED | OUTPATIENT
Start: 2020-06-21 | End: 2020-06-21

## 2020-06-21 RX ORDER — VANCOMYCIN HYDROCHLORIDE 1 G/200ML
1000 INJECTION, SOLUTION INTRAVENOUS ONCE
Status: COMPLETED | OUTPATIENT
Start: 2020-06-21 | End: 2020-06-21

## 2020-06-21 RX ORDER — INSULIN GLARGINE 100 [IU]/ML
5 INJECTION, SOLUTION SUBCUTANEOUS
Status: ON HOLD | COMMUNITY
End: 2020-10-26 | Stop reason: SDUPTHER

## 2020-06-21 RX ORDER — DOXAZOSIN MESYLATE 4 MG/1
4 TABLET ORAL DAILY
Status: DISCONTINUED | OUTPATIENT
Start: 2020-06-21 | End: 2020-06-23 | Stop reason: HOSPADM

## 2020-06-21 RX ADMIN — CLONIDINE HYDROCHLORIDE 0.1 MG: 0.1 TABLET ORAL at 21:45

## 2020-06-21 RX ADMIN — ASPIRIN 81 MG 81 MG: 81 TABLET ORAL at 08:59

## 2020-06-21 RX ADMIN — LABETALOL HCL 300 MG: 200 TABLET, FILM COATED ORAL at 21:44

## 2020-06-21 RX ADMIN — TORSEMIDE 100 MG: 100 TABLET ORAL at 20:52

## 2020-06-21 RX ADMIN — INSULIN LISPRO 3 UNITS: 100 INJECTION, SOLUTION INTRAVENOUS; SUBCUTANEOUS at 05:48

## 2020-06-21 RX ADMIN — CINACALCET 60 MG: 30 TABLET, FILM COATED ORAL at 08:58

## 2020-06-21 RX ADMIN — LISINOPRIL 40 MG: 20 TABLET ORAL at 18:00

## 2020-06-21 RX ADMIN — INSULIN HUMAN 5 UNITS: 100 INJECTION, SUSPENSION SUBCUTANEOUS at 12:27

## 2020-06-21 RX ADMIN — DEXTROSE AND SODIUM CHLORIDE 250 ML/HR: 5; .9 INJECTION, SOLUTION INTRAVENOUS at 03:21

## 2020-06-21 RX ADMIN — LABETALOL HCL 300 MG: 200 TABLET, FILM COATED ORAL at 08:59

## 2020-06-21 RX ADMIN — ACETAMINOPHEN 650 MG: 325 TABLET, FILM COATED ORAL at 08:58

## 2020-06-21 RX ADMIN — HEPARIN SODIUM 5000 UNITS: 5000 INJECTION INTRAVENOUS; SUBCUTANEOUS at 15:10

## 2020-06-21 RX ADMIN — INSULIN LISPRO 1 UNITS: 100 INJECTION, SOLUTION INTRAVENOUS; SUBCUTANEOUS at 18:22

## 2020-06-21 RX ADMIN — ONDANSETRON 4 MG: 2 INJECTION INTRAMUSCULAR; INTRAVENOUS at 20:38

## 2020-06-21 RX ADMIN — HYDRALAZINE HYDROCHLORIDE 100 MG: 25 TABLET ORAL at 21:44

## 2020-06-21 RX ADMIN — INSULIN HUMAN 4 UNITS: 100 INJECTION, SUSPENSION SUBCUTANEOUS at 21:47

## 2020-06-21 RX ADMIN — GENTAMICIN SULFATE 1 APPLICATION: 1 OINTMENT TOPICAL at 14:19

## 2020-06-21 RX ADMIN — ACETAMINOPHEN 650 MG: 325 TABLET, FILM COATED ORAL at 15:11

## 2020-06-21 RX ADMIN — GABAPENTIN 100 MG: 100 CAPSULE ORAL at 21:45

## 2020-06-21 RX ADMIN — HEPARIN SODIUM 5000 UNITS: 5000 INJECTION INTRAVENOUS; SUBCUTANEOUS at 21:44

## 2020-06-21 RX ADMIN — HYDRALAZINE HYDROCHLORIDE 100 MG: 25 TABLET ORAL at 05:37

## 2020-06-21 RX ADMIN — ESCITALOPRAM OXALATE 10 MG: 10 TABLET ORAL at 08:59

## 2020-06-21 RX ADMIN — INSULIN LISPRO 5 UNITS: 100 INJECTION, SOLUTION INTRAVENOUS; SUBCUTANEOUS at 09:25

## 2020-06-21 RX ADMIN — DIVALPROEX SODIUM 500 MG: 500 TABLET, FILM COATED, EXTENDED RELEASE ORAL at 08:59

## 2020-06-21 RX ADMIN — INSULIN LISPRO 2 UNITS: 100 INJECTION, SOLUTION INTRAVENOUS; SUBCUTANEOUS at 21:47

## 2020-06-21 RX ADMIN — VANCOMYCIN HYDROCHLORIDE 1000 MG: 1 INJECTION, SOLUTION INTRAVENOUS at 09:30

## 2020-06-21 RX ADMIN — ONDANSETRON 4 MG: 2 INJECTION INTRAMUSCULAR; INTRAVENOUS at 03:08

## 2020-06-21 RX ADMIN — NIFEDIPINE 60 MG: 30 TABLET, FILM COATED, EXTENDED RELEASE ORAL at 18:19

## 2020-06-21 RX ADMIN — MELATONIN 2000 UNITS: at 08:59

## 2020-06-21 RX ADMIN — HEPARIN SODIUM 5000 UNITS: 5000 INJECTION INTRAVENOUS; SUBCUTANEOUS at 05:38

## 2020-06-21 RX ADMIN — NIFEDIPINE 60 MG: 30 TABLET, FILM COATED, EXTENDED RELEASE ORAL at 09:00

## 2020-06-21 RX ADMIN — FAMOTIDINE 20 MG: 20 TABLET, FILM COATED ORAL at 08:59

## 2020-06-21 RX ADMIN — ONDANSETRON 4 MG: 2 INJECTION INTRAMUSCULAR; INTRAVENOUS at 15:50

## 2020-06-21 RX ADMIN — ATORVASTATIN CALCIUM 40 MG: 40 TABLET, FILM COATED ORAL at 21:44

## 2020-06-22 LAB
ANION GAP SERPL CALCULATED.3IONS-SCNC: 9 MMOL/L (ref 4–13)
APPEARANCE FLD: CLEAR
ATRIAL RATE: 126 BPM
BUN SERPL-MCNC: 44 MG/DL (ref 5–25)
CALCIUM SERPL-MCNC: 9.1 MG/DL (ref 8.3–10.1)
CHLORIDE SERPL-SCNC: 97 MMOL/L (ref 100–108)
CO2 SERPL-SCNC: 27 MMOL/L (ref 21–32)
COLOR FLD: COLORLESS
CREAT SERPL-MCNC: 13.08 MG/DL (ref 0.6–1.3)
GFR SERPL CREATININE-BSD FRML MDRD: 4 ML/MIN/1.73SQ M
GLUCOSE SERPL-MCNC: 124 MG/DL (ref 65–140)
GLUCOSE SERPL-MCNC: 153 MG/DL (ref 65–140)
GLUCOSE SERPL-MCNC: 171 MG/DL (ref 65–140)
GLUCOSE SERPL-MCNC: 175 MG/DL (ref 65–140)
GLUCOSE SERPL-MCNC: 191 MG/DL (ref 65–140)
GLUCOSE SERPL-MCNC: 202 MG/DL (ref 65–140)
GLUCOSE SERPL-MCNC: 228 MG/DL (ref 65–140)
GLUCOSE SERPL-MCNC: 266 MG/DL (ref 65–140)
GLUCOSE SERPL-MCNC: 269 MG/DL (ref 65–140)
GLUCOSE SERPL-MCNC: 285 MG/DL (ref 65–140)
GLUCOSE SERPL-MCNC: 82 MG/DL (ref 65–140)
LYMPHOCYTES NFR BLD AUTO: 27 %
MAGNESIUM SERPL-MCNC: 2.1 MG/DL (ref 1.6–2.6)
MONOCYTES NFR BLD AUTO: 30 %
NEUTS SEG NFR BLD AUTO: 43 %
P AXIS: 66 DEGREES
PHOSPHATE SERPL-MCNC: 5.8 MG/DL (ref 2.7–4.5)
POTASSIUM SERPL-SCNC: 4 MMOL/L (ref 3.5–5.3)
PR INTERVAL: 138 MS
QRS AXIS: 37 DEGREES
QRSD INTERVAL: 74 MS
QT INTERVAL: 318 MS
QTC INTERVAL: 460 MS
SODIUM SERPL-SCNC: 133 MMOL/L (ref 136–145)
T WAVE AXIS: 69 DEGREES
TOTAL CELLS COUNTED SPEC: 100
VANCOMYCIN SERPL-MCNC: 28.8 UG/ML
VENTRICULAR RATE: 126 BPM
WBC # FLD MANUAL: 510 /UL

## 2020-06-22 PROCEDURE — 89051 BODY FLUID CELL COUNT: CPT | Performed by: INTERNAL MEDICINE

## 2020-06-22 PROCEDURE — 99232 SBSQ HOSP IP/OBS MODERATE 35: CPT | Performed by: INTERNAL MEDICINE

## 2020-06-22 PROCEDURE — 99233 SBSQ HOSP IP/OBS HIGH 50: CPT | Performed by: INTERNAL MEDICINE

## 2020-06-22 PROCEDURE — 82948 REAGENT STRIP/BLOOD GLUCOSE: CPT

## 2020-06-22 PROCEDURE — 83735 ASSAY OF MAGNESIUM: CPT | Performed by: INTERNAL MEDICINE

## 2020-06-22 PROCEDURE — 80048 BASIC METABOLIC PNL TOTAL CA: CPT | Performed by: INTERNAL MEDICINE

## 2020-06-22 PROCEDURE — 93010 ELECTROCARDIOGRAM REPORT: CPT | Performed by: INTERNAL MEDICINE

## 2020-06-22 PROCEDURE — 80202 ASSAY OF VANCOMYCIN: CPT | Performed by: INTERNAL MEDICINE

## 2020-06-22 PROCEDURE — 84100 ASSAY OF PHOSPHORUS: CPT | Performed by: INTERNAL MEDICINE

## 2020-06-22 RX ADMIN — HEPARIN SODIUM 5000 UNITS: 5000 INJECTION INTRAVENOUS; SUBCUTANEOUS at 22:12

## 2020-06-22 RX ADMIN — ONDANSETRON 4 MG: 2 INJECTION INTRAMUSCULAR; INTRAVENOUS at 23:53

## 2020-06-22 RX ADMIN — DOXAZOSIN 4 MG: 4 TABLET ORAL at 08:53

## 2020-06-22 RX ADMIN — LABETALOL HCL 300 MG: 200 TABLET, FILM COATED ORAL at 08:59

## 2020-06-22 RX ADMIN — NIFEDIPINE 60 MG: 30 TABLET, FILM COATED, EXTENDED RELEASE ORAL at 08:54

## 2020-06-22 RX ADMIN — INSULIN LISPRO 2 UNITS: 100 INJECTION, SOLUTION INTRAVENOUS; SUBCUTANEOUS at 09:01

## 2020-06-22 RX ADMIN — TORSEMIDE 100 MG: 100 TABLET ORAL at 19:32

## 2020-06-22 RX ADMIN — HYDRALAZINE HYDROCHLORIDE 100 MG: 25 TABLET ORAL at 05:40

## 2020-06-22 RX ADMIN — INSULIN LISPRO 1 UNITS: 100 INJECTION, SOLUTION INTRAVENOUS; SUBCUTANEOUS at 22:18

## 2020-06-22 RX ADMIN — INSULIN GLARGINE 5 UNITS: 100 INJECTION, SOLUTION SUBCUTANEOUS at 08:56

## 2020-06-22 RX ADMIN — DIVALPROEX SODIUM 500 MG: 500 TABLET, FILM COATED, EXTENDED RELEASE ORAL at 08:54

## 2020-06-22 RX ADMIN — MELATONIN 2000 UNITS: at 08:52

## 2020-06-22 RX ADMIN — LISINOPRIL 40 MG: 20 TABLET ORAL at 17:27

## 2020-06-22 RX ADMIN — HEPARIN SODIUM 5000 UNITS: 5000 INJECTION INTRAVENOUS; SUBCUTANEOUS at 14:43

## 2020-06-22 RX ADMIN — FAMOTIDINE 20 MG: 20 TABLET, FILM COATED ORAL at 08:51

## 2020-06-22 RX ADMIN — GENTAMICIN SULFATE: 1 OINTMENT TOPICAL at 09:03

## 2020-06-22 RX ADMIN — INSULIN LISPRO 2 UNITS: 100 INJECTION, SOLUTION INTRAVENOUS; SUBCUTANEOUS at 12:06

## 2020-06-22 RX ADMIN — HYDRALAZINE HYDROCHLORIDE 100 MG: 25 TABLET ORAL at 22:12

## 2020-06-22 RX ADMIN — CINACALCET 60 MG: 30 TABLET, FILM COATED ORAL at 08:59

## 2020-06-22 RX ADMIN — LABETALOL HCL 300 MG: 200 TABLET, FILM COATED ORAL at 22:12

## 2020-06-22 RX ADMIN — ONDANSETRON 4 MG: 2 INJECTION INTRAMUSCULAR; INTRAVENOUS at 12:47

## 2020-06-22 RX ADMIN — GABAPENTIN 100 MG: 100 CAPSULE ORAL at 22:12

## 2020-06-22 RX ADMIN — HEPARIN SODIUM 5000 UNITS: 5000 INJECTION INTRAVENOUS; SUBCUTANEOUS at 05:40

## 2020-06-22 RX ADMIN — ATORVASTATIN CALCIUM 40 MG: 40 TABLET, FILM COATED ORAL at 22:12

## 2020-06-22 RX ADMIN — NIFEDIPINE 60 MG: 30 TABLET, FILM COATED, EXTENDED RELEASE ORAL at 17:28

## 2020-06-22 RX ADMIN — ASPIRIN 81 MG 81 MG: 81 TABLET ORAL at 08:53

## 2020-06-22 RX ADMIN — ESCITALOPRAM OXALATE 10 MG: 10 TABLET ORAL at 08:52

## 2020-06-22 RX ADMIN — TORSEMIDE 100 MG: 100 TABLET ORAL at 12:04

## 2020-06-23 VITALS
OXYGEN SATURATION: 99 % | HEIGHT: 71 IN | SYSTOLIC BLOOD PRESSURE: 170 MMHG | DIASTOLIC BLOOD PRESSURE: 82 MMHG | TEMPERATURE: 97.3 F | HEART RATE: 75 BPM | WEIGHT: 235.6 LBS | BODY MASS INDEX: 32.98 KG/M2 | RESPIRATION RATE: 16 BRPM

## 2020-06-23 LAB
ANION GAP SERPL CALCULATED.3IONS-SCNC: 11 MMOL/L (ref 4–13)
BACTERIA SPEC BFLD CULT: ABNORMAL
BASOPHILS # BLD AUTO: 0.06 THOUSANDS/ΜL (ref 0–0.1)
BASOPHILS NFR BLD AUTO: 1 % (ref 0–1)
BUN SERPL-MCNC: 42 MG/DL (ref 5–25)
CALCIUM SERPL-MCNC: 8.9 MG/DL (ref 8.3–10.1)
CHLORIDE SERPL-SCNC: 97 MMOL/L (ref 100–108)
CO2 SERPL-SCNC: 26 MMOL/L (ref 21–32)
CREAT SERPL-MCNC: 13.46 MG/DL (ref 0.6–1.3)
EOSINOPHIL # BLD AUTO: 1.54 THOUSAND/ΜL (ref 0–0.61)
EOSINOPHIL NFR BLD AUTO: 29 % (ref 0–6)
ERYTHROCYTE [DISTWIDTH] IN BLOOD BY AUTOMATED COUNT: 14.8 % (ref 11.6–15.1)
GFR SERPL CREATININE-BSD FRML MDRD: 4 ML/MIN/1.73SQ M
GLUCOSE SERPL-MCNC: 142 MG/DL (ref 65–140)
GLUCOSE SERPL-MCNC: 147 MG/DL (ref 65–140)
GLUCOSE SERPL-MCNC: 183 MG/DL (ref 65–140)
GLUCOSE SERPL-MCNC: 272 MG/DL (ref 65–140)
GLUCOSE SERPL-MCNC: 312 MG/DL (ref 65–140)
GLUCOSE SERPL-MCNC: 322 MG/DL (ref 65–140)
GLUCOSE SERPL-MCNC: 341 MG/DL (ref 65–140)
GLUCOSE SERPL-MCNC: 40 MG/DL (ref 65–140)
GLUCOSE SERPL-MCNC: 67 MG/DL (ref 65–140)
GRAM STN SPEC: ABNORMAL
GRAM STN SPEC: ABNORMAL
HCT VFR BLD AUTO: 26.4 % (ref 36.5–49.3)
HGB BLD-MCNC: 8.8 G/DL (ref 12–17)
IMM GRANULOCYTES # BLD AUTO: 0.02 THOUSAND/UL (ref 0–0.2)
IMM GRANULOCYTES NFR BLD AUTO: 0 % (ref 0–2)
LYMPHOCYTES # BLD AUTO: 1.22 THOUSANDS/ΜL (ref 0.6–4.47)
LYMPHOCYTES NFR BLD AUTO: 23 % (ref 14–44)
MCH RBC QN AUTO: 32.6 PG (ref 26.8–34.3)
MCHC RBC AUTO-ENTMCNC: 33.3 G/DL (ref 31.4–37.4)
MCV RBC AUTO: 98 FL (ref 82–98)
MONOCYTES # BLD AUTO: 0.37 THOUSAND/ΜL (ref 0.17–1.22)
MONOCYTES NFR BLD AUTO: 7 % (ref 4–12)
NEUTROPHILS # BLD AUTO: 2.17 THOUSANDS/ΜL (ref 1.85–7.62)
NEUTS SEG NFR BLD AUTO: 40 % (ref 43–75)
NRBC BLD AUTO-RTO: 0 /100 WBCS
PLATELET # BLD AUTO: 266 THOUSANDS/UL (ref 149–390)
PMV BLD AUTO: 10.2 FL (ref 8.9–12.7)
POTASSIUM SERPL-SCNC: 4.7 MMOL/L (ref 3.5–5.3)
RBC # BLD AUTO: 2.7 MILLION/UL (ref 3.88–5.62)
SODIUM SERPL-SCNC: 134 MMOL/L (ref 136–145)
VANCOMYCIN SERPL-MCNC: 24.4 UG/ML
WBC # BLD AUTO: 5.38 THOUSAND/UL (ref 4.31–10.16)

## 2020-06-23 PROCEDURE — 85025 COMPLETE CBC W/AUTO DIFF WBC: CPT | Performed by: INTERNAL MEDICINE

## 2020-06-23 PROCEDURE — 99232 SBSQ HOSP IP/OBS MODERATE 35: CPT | Performed by: INTERNAL MEDICINE

## 2020-06-23 PROCEDURE — 80048 BASIC METABOLIC PNL TOTAL CA: CPT | Performed by: INTERNAL MEDICINE

## 2020-06-23 PROCEDURE — 99239 HOSP IP/OBS DSCHRG MGMT >30: CPT | Performed by: INTERNAL MEDICINE

## 2020-06-23 PROCEDURE — 99233 SBSQ HOSP IP/OBS HIGH 50: CPT | Performed by: INTERNAL MEDICINE

## 2020-06-23 PROCEDURE — 80202 ASSAY OF VANCOMYCIN: CPT | Performed by: INTERNAL MEDICINE

## 2020-06-23 PROCEDURE — 82948 REAGENT STRIP/BLOOD GLUCOSE: CPT

## 2020-06-23 RX ORDER — INSULIN LISPRO 100 [IU]/ML
INJECTION, SOLUTION INTRAVENOUS; SUBCUTANEOUS
Qty: 3 ML | Refills: 0
Start: 2020-06-23 | End: 2020-11-25 | Stop reason: HOSPADM

## 2020-06-23 RX ADMIN — INSULIN GLARGINE 5 UNITS: 100 INJECTION, SOLUTION SUBCUTANEOUS at 09:34

## 2020-06-23 RX ADMIN — NIFEDIPINE 60 MG: 30 TABLET, FILM COATED, EXTENDED RELEASE ORAL at 09:09

## 2020-06-23 RX ADMIN — ESCITALOPRAM OXALATE 10 MG: 10 TABLET ORAL at 09:11

## 2020-06-23 RX ADMIN — TORSEMIDE 100 MG: 100 TABLET ORAL at 12:15

## 2020-06-23 RX ADMIN — HYDRALAZINE HYDROCHLORIDE 100 MG: 25 TABLET ORAL at 06:39

## 2020-06-23 RX ADMIN — INSULIN LISPRO 1 UNITS: 100 INJECTION, SOLUTION INTRAVENOUS; SUBCUTANEOUS at 09:33

## 2020-06-23 RX ADMIN — DIVALPROEX SODIUM 500 MG: 500 TABLET, FILM COATED, EXTENDED RELEASE ORAL at 09:09

## 2020-06-23 RX ADMIN — CINACALCET 60 MG: 30 TABLET, FILM COATED ORAL at 09:09

## 2020-06-23 RX ADMIN — MELATONIN 2000 UNITS: at 09:11

## 2020-06-23 RX ADMIN — HEPARIN SODIUM 5000 UNITS: 5000 INJECTION INTRAVENOUS; SUBCUTANEOUS at 06:41

## 2020-06-23 RX ADMIN — FAMOTIDINE 20 MG: 20 TABLET, FILM COATED ORAL at 09:09

## 2020-06-23 RX ADMIN — LABETALOL HCL 300 MG: 200 TABLET, FILM COATED ORAL at 09:10

## 2020-06-23 RX ADMIN — DOXAZOSIN 4 MG: 4 TABLET ORAL at 09:15

## 2020-06-23 RX ADMIN — GENTAMICIN SULFATE: 1 OINTMENT TOPICAL at 09:26

## 2020-06-23 RX ADMIN — ONDANSETRON 4 MG: 2 INJECTION INTRAMUSCULAR; INTRAVENOUS at 09:12

## 2020-06-23 RX ADMIN — ASPIRIN 81 MG 81 MG: 81 TABLET ORAL at 09:09

## 2020-06-24 DIAGNOSIS — Z71.89 COMPLEX CARE COORDINATION: Primary | ICD-10-CM

## 2020-06-25 LAB
BACTERIA BLD CULT: NORMAL
BACTERIA BLD CULT: NORMAL
BACTERIA SPEC BFLD CULT: NO GROWTH
GRAM STN SPEC: NORMAL

## 2020-06-29 ENCOUNTER — PATIENT OUTREACH (OUTPATIENT)
Dept: INTERNAL MEDICINE CLINIC | Facility: CLINIC | Age: 35
End: 2020-06-29

## 2020-06-30 ENCOUNTER — TRANSITIONAL CARE MANAGEMENT (OUTPATIENT)
Dept: INTERNAL MEDICINE CLINIC | Facility: CLINIC | Age: 35
End: 2020-06-30

## 2020-06-30 NOTE — PROGRESS NOTES
Assessment/Plan:    Problem List Items Addressed This Visit        Endocrine    Type 1 diabetes mellitus with hypoglycemia (HCC) (Chronic)     -DM1 with polyneuropathy, ESRD and gastroparesis  A1c controlled at 6 8  Lab Results   Component Value Date    HGBA1C 6 8 (H) 06/21/2020   -defer insulin adjustment to Endo  Currently take NPH 8u qHS, Lantus 6units in the AM and Humalog 4-2-4  -foot exam performed         Diabetic neuropathy (HCC)     -on gabapentin  Lab Results   Component Value Date    HGBA1C 6 8 (H) 06/21/2020               Cardiovascular and Mediastinum    Renovascular hypertension     -defer medication management to Nephro  -pt on clonidine 0 1mg TID, doxazosin 4mg bid, labetolol 300mg q12, lisinopril 40mg daily, nifedipine 60mg bid and torsemide 100mg two daily            Other    End-stage renal disease on peritoneal dialysis ([facility] [address] )    Current moderate episode of major depressive disorder without prior episode ([facility] ) - Primary     -controlled, denies acute worsening  -continue on escitalopram 10mg daily         Peritonitis ([facility] )     -due to Staphylococcus epidermis PD catheter  -he is completing 14day course of intraperitoneal vancomycin               Subjective:      Patient ID: Jun Rivas is a 37 y o  male  TCM Call (since 5/31/2020)     Date and time call was made  6/24/2020 11:48 AM    Hospital care reviewed  Records reviewed    Patient was hospitialized at  [address] [place] [address]    Date of Admission  06/19/20    Date of discharge  06/23/20    Diagnosis  Peritonitis    Disposition  Home    Current Symptoms  None      TCM Call (since 5/31/2020)     Scheduled for follow up? Yes    I have advised the patient to call PCP with any new or worsening symptoms  Jose Alfredo Vidales    Counseling  Patient    Comments  Patient appointment scheduled for 7/1/20          44yo male with DM1 with ESRD on PD, gastroparesis, HLD, HTN, vitamin D def, MDD and h/o CVA here for ZO     He is accompanied by his mother  He was hospitalized at [facility] 6/19-6/23/20 for Staphylococcus epidermis PD catheter related peritonitis after presenting with abd pain, n/v, tachycardia and leukocytosis  He received vancomycin and will be completing day 14 tomorrow through intraperitoneal   He denies fever  He has self adjusted his insulin regimen but plans to return to discharge doses  He continues to have suppressed appetite, he is eating small meals but has not had vomiting or abdominal pain  Blood pressure remains elevated in the morning but does lower through the course of the day  His mother reports minoxidil was tried by nephrologist but caused hypotension during the day and it was subsequent discontinued  Depression   This is a chronic problem  The current episode started more than 1 year ago  Associated symptoms include nausea and numbness  Pertinent negatives include no abdominal pain, chest pain, coughing, fatigue, fever, headaches, vomiting or weakness  He has tried nothing for the symptoms       The following portions of the patient's history were reviewed and updated as appropriate: allergies, current medications, past family history, past medical history, past social history, past surgical history and problem list     Current Outpatient Medications:     aspirin 81 mg chewable tablet, Chew 81 mg daily, Disp: , Rfl:     atorvastatin (LIPITOR) 40 mg tablet, Take 1 tablet (40 mg total) by mouth every evening (Patient taking differently: Take 40 mg by mouth daily at bedtime ), Disp: 90 tablet, Rfl: 1    AVASTIN 100 MG/4ML, , Disp: , Rfl:     b complex vitamins capsule, Take 1 capsule by mouth daily, Disp: , Rfl:     B-D ULTRAFINE III SHORT PEN 31G X 8 MM MISC, USE 1 PEN NEEDLE 8 TIMES DAILY, Disp: 100 each, Rfl: 47    calcium acetate (PHOSLO) 667 mg capsule, Take 1 capsule (667 mg total) by mouth 3 (three) times a day with meals, Disp: , Rfl: 0    Cholecalciferol (VITAMIN D) 2000 units CAPS, Take 1 capsule by mouth daily, Disp: , Rfl:     cinacalcet (SENSIPAR) 60 MG tablet, Take 1 tablet (60 mg total) by mouth daily, Disp: , Rfl:     cloNIDine (CATAPRES) 0 1 mg tablet, Take 0 1 mg by mouth 3 (three) times a day May add another 0 1 as needed, Disp: , Rfl:     divalproex sodium (DEPAKOTE ER) 500 mg 24 hr tablet, Take 1 tablet (500 mg total) by mouth daily, Disp: 30 tablet, Rfl: 5    doxazosin (CARDURA) 2 mg tablet, Take 1 tablet (2 mg total) by mouth daily at bedtime (Patient taking differently: Take 4 mg by mouth 2 (two) times a day ), Disp: 30 tablet, Rfl: 0    escitalopram (LEXAPRO) 10 mg tablet, Take 1 tablet (10 mg total) by mouth daily, Disp: 90 tablet, Rfl: 2    famotidine (PEPCID) 20 mg tablet, Take 1 tablet (20 mg total) by mouth 2 (two) times a day, Disp: 60 tablet, Rfl: 5    gabapentin (NEURONTIN) 100 mg capsule, Take 100 mg by mouth daily at bedtime, Disp: , Rfl: 1    gentamicin (GARAMYCIN) 0 1 % cream, APPLY TO EXIT SITE ONCE DAILY, Disp: 30 g, Rfl: 2    GLUCAGON EMERGENCY 1 MG injection, INJECT 1MG SUBCUTANEOUSLY AS NEEDED (AS DIRECTED)   MAY REPEAT DOSE EVERY 20 MINUTES AS NEEDED, Disp: , Rfl: 3    hydrALAZINE (APRESOLINE) 100 MG tablet, Take 0 5 tablets (50 mg total) by mouth 3 (three) times a day, Disp: , Rfl:     insulin glargine (LANTUS) 100 units/mL subcutaneous injection, Inject 5 Units under the skin daily after breakfast, Disp: , Rfl:     insulin lispro (Admelog SoloStar) 100 units/mL injection pen, Take 4 units with breakfast, 2 units with lunch, and 4 units with dinner, Disp: 3 mL, Rfl: 0    insulin NPH (HumuLIN N,NovoLIN N) 100 Units/mL subcutaneous injection, Inject 4 Units under the skin daily at bedtime, Disp: , Rfl:     labetalol (NORMODYNE) 300 mg tablet, Take 1 tablet (300 mg total) by mouth every 12 (twelve) hours, Disp: 60 tablet, Rfl: 0    lisinopril (ZESTRIL) 40 mg tablet, Take 40 mg by mouth daily, Disp: , Rfl:     metolazone (ZAROXOLYN) 10 mg tablet, Take 10 mg by mouth 3 (three) times a week, Disp: , Rfl:     NIFEdipine ER (ADALAT CC) 60 MG 24 hr tablet, Take 1 tablet (60 mg total) by mouth 2 (two) times a day, Disp: 180 tablet, Rfl: 0    ondansetron (ZOFRAN) 4 mg tablet, Take 1 tablet (4 mg total) by mouth every 8 (eight) hours as needed for nausea or vomiting (Patient taking differently: Take 4 mg by mouth every 8 (eight) hours as needed for nausea or vomiting ), Disp: 30 tablet, Rfl: 0    Probiotic Product (PROBIOTIC DAILY) CAPS, Take 1 capsule by mouth daily at bedtime , Disp: , Rfl:     Sucroferric Oxyhydroxide (VELPHORO PO), 500 mg 3 (three) times a day with meals , Disp: , Rfl:     torsemide (DEMADEX) 100 mg tablet, Take 100 mg by mouth 2 (two) times a day , Disp: , Rfl: 11    Review of Systems   Constitutional: Positive for appetite change and unexpected weight change (weight loss)  Negative for activity change, fatigue and fever  Respiratory: Negative for cough, shortness of breath, wheezing and stridor  Cardiovascular: Positive for leg swelling  Negative for chest pain and palpitations  Gastrointestinal: Positive for nausea  Negative for abdominal pain, constipation and vomiting  Neurological: Positive for dizziness and numbness  Negative for weakness and headaches  Psychiatric/Behavioral: Positive for decreased concentration, depression and dysphoric mood  Negative for sleep disturbance  The patient is nervous/anxious  Objective:    /80 (BP Location: Left arm, Patient Position: Sitting)   Pulse 92   Temp 98 5 °F (36 9 °C)   Ht 5' 11" (1 803 m)   Wt 103 kg (227 lb)   SpO2 98%   BMI 31 66 kg/m²      Physical Exam   Constitutional: He is oriented to person, place, and time  He appears well-developed and well-nourished  No distress  Neck: Neck supple  Cardiovascular: Normal rate, regular rhythm and normal heart sounds  Pulses are weak pulses     Pulses:       Dorsalis pedis pulses are 1+ on the right side, and 1+ on the left side         Posterior tibial pulses are 1+ on the right side, and 1+ on the left side  Trace BLE edema noted   Pulmonary/Chest: Effort normal and breath sounds normal  No stridor  No respiratory distress  Abdominal: Soft  Bowel sounds are normal  He exhibits no distension  There is no tenderness  Ecchymoses on lower abdomen  PD catheter intact   Feet:   Right Foot:   Skin Integrity: Positive for dry skin  Left Foot:   Skin Integrity: Positive for dry skin  Neurological: He is alert and oriented to person, place, and time  Skin: He is not diaphoretic  Psychiatric: His speech is normal and behavior is normal  His mood appears anxious  He does not exhibit a depressed mood  He is attentive  Vitals reviewed  Right Foot/Ankle   Right Foot Inspection  Skin Exam: dry skin                          Toe Exam: right toe deformity (hammertoes)  Sensory       Monofilament testing: diminished  Vascular    The right DP pulse is 1+  The right PT pulse is 1+  Left Foot/Ankle  Left Foot Inspection  Skin Exam: dry skin                         Toe Exam: left toe deformity (hammertoes)                   Sensory       Monofilament: diminished  Vascular    The left DP pulse is 1+  The left PT pulse is 1+  Assign Risk Category:  Deformity present;  Loss of protective sensation; Weak pulses       Risk: 2

## 2020-07-01 ENCOUNTER — REMOTE DEVICE CLINIC VISIT (OUTPATIENT)
Dept: CARDIOLOGY CLINIC | Facility: CLINIC | Age: 35
End: 2020-07-01
Payer: MEDICARE

## 2020-07-01 ENCOUNTER — OFFICE VISIT (OUTPATIENT)
Dept: INTERNAL MEDICINE CLINIC | Facility: CLINIC | Age: 35
End: 2020-07-01
Payer: MEDICARE

## 2020-07-01 VITALS
HEART RATE: 92 BPM | SYSTOLIC BLOOD PRESSURE: 166 MMHG | WEIGHT: 227 LBS | TEMPERATURE: 98.5 F | OXYGEN SATURATION: 98 % | HEIGHT: 71 IN | BODY MASS INDEX: 31.78 KG/M2 | DIASTOLIC BLOOD PRESSURE: 80 MMHG

## 2020-07-01 DIAGNOSIS — I15.0 RENOVASCULAR HYPERTENSION: ICD-10-CM

## 2020-07-01 DIAGNOSIS — Z99.2 END-STAGE RENAL DISEASE ON PERITONEAL DIALYSIS (HCC): ICD-10-CM

## 2020-07-01 DIAGNOSIS — E10.42 DIABETIC POLYNEUROPATHY ASSOCIATED WITH TYPE 1 DIABETES MELLITUS (HCC): ICD-10-CM

## 2020-07-01 DIAGNOSIS — F32.1 CURRENT MODERATE EPISODE OF MAJOR DEPRESSIVE DISORDER WITHOUT PRIOR EPISODE (HCC): Primary | ICD-10-CM

## 2020-07-01 DIAGNOSIS — Z95.818 PRESENCE OF OTHER CARDIAC IMPLANTS AND GRAFTS: Primary | ICD-10-CM

## 2020-07-01 DIAGNOSIS — N18.6 END-STAGE RENAL DISEASE ON PERITONEAL DIALYSIS (HCC): ICD-10-CM

## 2020-07-01 DIAGNOSIS — K65.9 PERITONITIS (HCC): ICD-10-CM

## 2020-07-01 DIAGNOSIS — E10.649 TYPE 1 DIABETES MELLITUS WITH HYPOGLYCEMIA AND WITHOUT COMA (HCC): Chronic | ICD-10-CM

## 2020-07-01 PROBLEM — A41.9 SEPSIS (HCC): Status: RESOLVED | Noted: 2019-12-11 | Resolved: 2020-07-01

## 2020-07-01 PROCEDURE — G2066 INTER DEVC REMOTE 30D: HCPCS | Performed by: INTERNAL MEDICINE

## 2020-07-01 PROCEDURE — 93298 REM INTERROG DEV EVAL SCRMS: CPT | Performed by: INTERNAL MEDICINE

## 2020-07-01 PROCEDURE — 99496 TRANSJ CARE MGMT HIGH F2F 7D: CPT | Performed by: INTERNAL MEDICINE

## 2020-07-01 NOTE — ASSESSMENT & PLAN NOTE
-DM1 with polyneuropathy, ESRD and gastroparesis  A1c controlled at 6 8  Lab Results   Component Value Date    HGBA1C 6 8 (H) 06/21/2020   -defer insulin adjustment to Endo    Currently take NPH 8u qHS, Lantus 6units in the AM and Humalog 4-2-4  -foot exam performed

## 2020-07-01 NOTE — PROGRESS NOTES
MDT LOOP  CARELINK TRANSMISSION: LOOP RECORDER  PRESENTING RHYTHM NSR @ 92 BPM  BATTERY STATUS "OK"  NO PATIENT OR DEVICE ACTIVATED EPISODES  NORMAL DEVICE FUNCTION   DL

## 2020-07-01 NOTE — ASSESSMENT & PLAN NOTE
-defer medication management to Nephro  -pt on clonidine 0 1mg TID, doxazosin 4mg bid, labetolol 300mg q12, lisinopril 40mg daily, nifedipine 60mg bid and torsemide 100mg two daily

## 2020-07-01 NOTE — ASSESSMENT & PLAN NOTE
-due to Staphylococcus epidermis PD catheter  -he is completing 14day course of intraperitoneal vancomycin

## 2020-07-08 ENCOUNTER — PATIENT OUTREACH (OUTPATIENT)
Dept: INTERNAL MEDICINE CLINIC | Facility: CLINIC | Age: 35
End: 2020-07-08

## 2020-07-08 NOTE — PROGRESS NOTES
Spoke with Isabelle Phillip and introduced myself and why I was calling  He states at this time he is managing his health pretty good  His parents are a great support system for him  He has no issues obtaining his medications or getting to appointments  He declines the need for me to call back at this time

## 2020-07-23 ENCOUNTER — TELEMEDICINE (OUTPATIENT)
Dept: NEUROLOGY | Facility: CLINIC | Age: 35
End: 2020-07-23
Payer: MEDICARE

## 2020-07-23 VITALS — HEIGHT: 71 IN | WEIGHT: 223 LBS | BODY MASS INDEX: 31.22 KG/M2

## 2020-07-23 DIAGNOSIS — R56.9 PROVOKED SEIZURE (HCC): Primary | ICD-10-CM

## 2020-07-23 PROBLEM — M79.602 LEFT ARM PAIN: Status: RESOLVED | Noted: 2020-02-29 | Resolved: 2020-07-23

## 2020-07-23 PROBLEM — N18.6 END-STAGE RENAL DISEASE (HCC): Status: ACTIVE | Noted: 2020-07-23

## 2020-07-23 PROCEDURE — 1111F DSCHRG MED/CURRENT MED MERGE: CPT | Performed by: PSYCHIATRY & NEUROLOGY

## 2020-07-23 PROCEDURE — 3044F HG A1C LEVEL LT 7.0%: CPT | Performed by: PSYCHIATRY & NEUROLOGY

## 2020-07-23 PROCEDURE — 3008F BODY MASS INDEX DOCD: CPT | Performed by: PSYCHIATRY & NEUROLOGY

## 2020-07-23 PROCEDURE — 99214 OFFICE O/P EST MOD 30 MIN: CPT | Performed by: PSYCHIATRY & NEUROLOGY

## 2020-07-23 PROCEDURE — 1036F TOBACCO NON-USER: CPT | Performed by: PSYCHIATRY & NEUROLOGY

## 2020-07-23 PROCEDURE — 2026F EYE IMG VALID EVC RTNOPTHY: CPT | Performed by: PSYCHIATRY & NEUROLOGY

## 2020-07-23 PROCEDURE — 3066F NEPHROPATHY DOC TX: CPT | Performed by: PSYCHIATRY & NEUROLOGY

## 2020-07-23 RX ORDER — DIVALPROEX SODIUM 250 MG/1
250 TABLET, EXTENDED RELEASE ORAL DAILY
Qty: 14 TABLET | Refills: 0
Start: 2020-07-23 | End: 2020-10-26 | Stop reason: HOSPADM

## 2020-07-23 NOTE — PATIENT INSTRUCTIONS
Plan:   Likely you had a provoked seizure in the setting of uremia and hypertensive encephalopathy  No clear history of cortical stroke on MRI brain study  No prior history of seizures or convulsions   - Finish off Divalproex ER 500mg one tab daily at bedtime then next refill is for 250mg tab take 1 tab at bedtime for 14 days then stop  - routine EEG study in 2-4 weeks after the last dose of Divalproex   - if you were to have a recurrent convulsive seizure call 911/EMS for urgent evaluation  - if you have an unusual spell or episode of unresponsiveness or confusion or altered awareness that is less than 5 minutes, call the office the next day; if the episode is more than 30 minutes call 911/EMS    - Dr Tho Parrish in 3 months

## 2020-07-23 NOTE — PROGRESS NOTES
Virtual Brief Visit         Reason for visit is for follow-up for seizure  Provider located at [address] [place] [address] [address]a [address] [place] [id number]    Recent Visits  No visits were found meeting these conditions  Showing recent visits within past 7 days and meeting all other requirements     Today's Visits  Date Type Provider Dept   07/23/20 Telemedicine Vadim Chaparro MD  Neuro [facility]   Showing today's visits and meeting all other requirements     Future Appointments  No visits were found meeting these conditions  Showing future appointments within next 150 days and meeting all other requirements       The patient was identified by name and date of birth  The patient is located at [place], [place]  Huey Quispe was informed that this is a telemedicine visit and that the visit is being conducted through See[employer]  My office door was closed  No one else was in the room  He acknowledged consent and understanding of privacy and security of the video platform  The patient has agreed to participate and understands he can discontinue the visit at any time  Patient is aware this is a billable service  As a result of this visit, I have not referred the patient for further respiratory evaluation  I spent 37 minutes with patient today in which greater than 50% of the time was spent in counseling/coordination of care regarding seizure  VIRTUAL VISIT DISCLAIMER    Huey Quispe acknowledges that he has consented to an online visit or consultation  He understands that the online visit is based solely on information provided by him, and that, in the absence of a face-to-face physical evaluation by the physician, the diagnosis he receives is both limited and provisional in terms of accuracy and completeness  This is not intended to replace a full medical face-to-face evaluation by the physician   Huey Quispe understands and accepts these terms  [facility]  Patient's Name: Jun Rivas   Patient's : 1985   Visit Type: follow-up  Referring MD / PCP:  Soo Hong DO    Assessment:  Mr Jun Rivas is a 37 y o  man who we suspect had a provoked "seizure" in the setting of hypertensive and uremic encephalopathy  He struggles with hypertension, diabetes, and chronic kidney disease on peritoneal dialysis  Thus far EEG studies and brain imaging studies did not indicate a clear predisposition to recurrent seizures  Due to the clear diagnosis of an underlying epilepsy, we decided to proceed with further weaning and eventually discontinuation of Divalproex  It is not clear if the intermittent erythropoietin medication was the cause of his seizure, as he had been on this medication previously without adverse reaction  He had routine EEG study several months ago, this test is not sensitive to rule out the possibility of recurrent seizures  We will repeat the routine EEG study after we have him come off of divalproex  If there are other clinical events of staring or unresponsiveness then we will consider performing an ambulatory EEG study to increase the yield of finding interictal epileptifom discharges  Plan:   Likely you had a provoked seizure in the setting of uremia and hypertensive encephalopathy  No clear history of cortical stroke on MRI brain study    No prior history of seizures or convulsions   - Finish off Divalproex ER 500mg one tab daily at bedtime then next refill is for 250mg tab take 1 tab at bedtime for 14 days then stop  - routine EEG study in 2-4 weeks after the last dose of Divalproex   - if you were to have a recurrent convulsive seizure call 911/EMS for urgent evaluation  - if you have an unusual spell or episode of unresponsiveness or confusion or altered awareness that is less than 5 minutes, call the office the next day; if the episode is more than 30 minutes call 911/EMS  - Dr Gilliam Call in 3 months    Problem List Items Addressed This Visit        Other    Provoked seizure ([address] ) - Primary    Relevant Medications    divalproex sodium (DEPAKOTE ER) 250 mg 24 hr tablet    Other Relevant Orders    EEG awake or drowsy routine          Chief Complaint:   Chief Complaint   Patient presents with    Seizures      HPI:      Alondra Lewis is a 37 y o  right handed male here for follow-up evaluation of seizure  Interval History 7/23/2020  He had a follow-up visit with Alana Menjivar on 5/12/2020  His divalproex was decreased to 500mg daily  There have been no recurrent seizure, loss of consciousness, or black outs  There have been no episode of altered awareness, speech impairment, or confusion  Since decreasing divalproex, he is twitching less (or tremor), he feels that he has better dexterity with his hands as he came down on the divalproex  He recalled that he had an infection in his catheter from peritoneal dialysis  He has completed his antibiotics a couple of weeks ago  He continues to have episodes of lightheadedness, dizziness, and some balance difficulty ever since his stroke  AED/side effects/compliance:  Divalproex  daily    Event/Seizure semiology:  1  "seizing" in the setting of hypertensive encephalopathy - 2/21/2020    Prior Epilepsy History:  Intake History 3/9/2020  Initially, the patient went to the ED in [facility] on 2/20/2020 for two days of dizziness and word finding difficulty (seemingly not speaking and inattentive)  When neurology was assessing the patient on 2/21/2020, he was seizing (see note by Dr Ren Flores) and a rapid response was called for status epilepticus  Patient was subsequently given lorazepam 10mg along with midazolam 4mg, intubated, and loaded with 2 g of valproic acid  He was then transferred to [facility] for video EEG monitoring  There was no seizure on video EEG monitoring    The neurology consultant considered that a few right arm jerks and eye deviation was concerning for seizure (or in follow-up neurology note: he was poorly responsive, alternating deviation of gaze and muscle rigidity)  At the end, neurology thought that the seizure could have been due to hypertensive encephalopathy (when seen by Dr Ritu Novoa -374/; 2/21/2020 his BP was 198/110)  He was discharged on divalproex  MRI brain study did not find any new pathology  Video EEG study did not capture further seizures  He was previously followed by Dr Elias Jane due to multiple white matter T2 hyperintensities along with an acute stroke in January 2018 that affected his abducens nucleus causing diplopia (included CSF work up to rule out MS)  He was seen by Dr Michelle Elizondo for the extensive white matter changes on MRI brain imaging study; she did not believe that he had an underlying demyelinating syndrome  He has 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 and 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  (he was seen by Hematology at [facility] on 3/28/2019, the significance of MTHFR mutation is unclear, heterozygous prothrombin gene is more concerning, no family history of VTE and has a greater risk  There is a nephrology consultation follow-up note on 2/22/2020 that referred to the patient getting Epogen 24347 units weekly, and it was on hold when he had seizures  He was on Epogen for about a year and a half ago, it is given if his hemoglobin is less 10  He gets Epogen at his dialysis  Last blood work was 2/7/2020  He saw Dr Ruth Adames ([address]) on 1/17/2020 and goes to [facility] to evaluate his peritoneal dialysis and that is where he gets Epogen injection  It seems that he gets Epogen injection twice a month and last one was on 2/7/2020  Patient's history:  He is here with his parents  He was confused on the day he presented to the hospital   He was having a difficult time remembering facts    He was not feeling well for 2 days, he was lethargic, he seems like he was staring for a few seconds, not being able to say what he wanted to say (not able to get the right words out, could not describe how he was feeling)  There was no twitching or jerking activity  There is no prior history of convulsive seizure or childhood seizures  He never had difficulty with finding words in the past     He continues to have difficulty recalling what happened during the hospitalization  His blood pressure in the morning could be 180/100  He usually tries to aim for -160  His nephrologist at his dialysis center manages his blood pressure  If his systolilc blood pressure goes below 120, he gets dizzy (motion feeling or lightheadedness)  There has been no recurrent episode of word finding difficulty  During dialysis (he does his own night time peritoneal dialysis), he notices that his hands have tremors or twitching that makes it difficult for him to control his fine motor skill, including difficulty controlling his fingers for utensils and writing  He has difficulty controlling his hands to  pills  The patient denies any history of myoclonus, staring spells, automatisms, unexplained hyperkinetic behaviors, olfactory / gustatory hallucinations, epigastric rising events, rodolfo vu events, visual hallucinations, unexplained nocturnal enuresis, or nocturnal tongue biting      Special Features  Status epilepticus: No  Self Injury Seizures: No  Precipitating Factors: None    Epilepsy Risk Factors:  Abnormal pregnancy: No  Abnormal birth/: No  Abnormal Development: No  Febrile seizures, simple: No  Febrile seizures, complex: No  CNS infection: No  Mental retardation: No  Cerebral palsy: No  Head injury (moderate/severe): No  CNS neoplasm: No  CNS malformation: No  Neurosurgical procedure: No  Stroke: h/o of 2 strokes; first stroke - loss of balance/walking; second stroke - caused double vision, he needed strabismus surgery to help (MRI brain 2018 - left facial droop, diffusion restriction in the posterior aspect of the velia near the facial colliculus (involving the abducens nucleus))  Alcohol abuse: No  Drug abuse: No  Family history Sz/epilepsy: No    Prior AEDs:  medication Max dose Time used Reason to stop                 Prior workup:  x  Imagin2020  MRI brain w/o  Chronic lacunar infarcts in the left centrum semiovale and left thalamus  Previously demonstrated punctate infarct in the posterior velia is no longer visualized  Periventricular/subcortical T2 hyperintensities  Stable enlargement of the ventricles and sulci, consistent with volume loss     2018  MRI brain  Scattered T2 hyperintensities including in the velia, bilateral middle cerebellar peduncle, inferior cerebellar peduncle, right medulla, right inferior cerebellar peduncle, left frontal region  2018  MRI brain  Small focus of subacute infarct along the posterior aspect of the velia near the facial colliculus (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)  White matter signal changes    EEGs:  -2020  LTM - initially the study was started when he was intubated  There was 9-10 Hz alpha activity with diffuse polymorphic delta activity, overtime the background improved with alpha, theta, and beta activity  There were no epileptiform discharges  Events nonepileptic - appearing to be stretching and intermittent word finding difficulty (difficulty with speech)    3/18/2020  Excessive theta activity in the posterior rhythm      Labs:  Component      Latest Ref Rng & Units 2020   WBC      4 31 - 10 16 Thousand/uL 5 38   Red Blood Cell Count      3 88 - 5 62 Million/uL 2 70 (L)   Hemoglobin      12 0 - 17 0 g/dL 8 8 (L)   HCT      36 5 - 49 3 % 26 4 (L)   Platelet Count      807 - 390 Thousands/uL 266   Sodium      136 - 145 mmol/L 134 (L)   Potassium      3 5 - 5 3 mmol/L 4 7   Chloride      100 - 108 mmol/L 97 (L)   CO2      21 - 32 mmol/L 26   Anion Gap      4 - 13 mmol/L 11   BUN      5 - 25 mg/dL 42 (H)   Creatinine      0 60 - 1 30 mg/dL 13 46 (H)   Glucose, Random      65 - 140 mg/dL 341 (H)   Calcium      8 3 - 10 1 mg/dL 8 9   eGFR      ml/min/1 73sq m 4       VIDEO EXAM  General exam   Ht 5' 11" (1 803 m)   Wt 101 kg (223 lb)   BMI 31 10 kg/m²    Appearance: normally developed, appears well  Carotids: not assessed  Cardiovascular: unable to assess on video visit  Pulmonary: unable to assess on virtual visit    HEENT: anicteric   Fundoscopy: not assessed    Mental status  Orientation: alert and oriented to name, place, time  Fund of Knowledge: intact   Attention and Concentration: WORLD - DLROW  Current and Remote Memory:intact  Language: spontaneous speech is normal and comprehension is intact    Cranial Nerves  CN 1: not tested  CN 2: unable to assess due to video exam   CN 3, 4, 6: EOMI, no nystagmus  CN 5:sensation intact to all distribution V1, V2, V3  CN 7:muscles of facial expression are symmetric  CN 8:not assessed  CN 9, 10:no dysarthria present  CN 11:not assessed  CN 12:tongue is midline    Motor:  Bulk, Tone: normal bulk, normal tone  Pronation: no pronator drift  Strength: There is no overt weakness of his hands and arms and legs; confrontational testing was not performed on a virtual visit    There is no abnormal movements of his hands    Sensory:  Lighttouch: not assessed   Romberg: Not assessed    Coordination:  FNF:not assessed  VERA:not assessed  FFM:not assessed  Gait/Station:wide base gait    Reflexes:  Not assessed    Past Medical/Surgical History:  Patient Active Problem List   Diagnosis    Type [facility] mellitus [facility]    History of lacunar cerebrovascular accident (CVA)    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,      Binocular visual disturbance    Vitamin D deficiency    Homozygous MTHFR mutation C677T ([facility]    End-stage renal disease on peritoneal dialysis ([facility] )    Persistent proteinuria    Bilateral leg edema    Ataxia    Esophagitis    Left atrial dilation    Renovascular hypertension    Anemia    Heterozygous for prothrombin [id number] mutation [facility]    Peritoneal dialysis catheter in place ([facility] )    Dyslipidemia    Strabismus    Diarrhea    Current moderate episode of major depressive disorder without prior episode ([place] [address] )    Environmental and seasonal allergies    Pruritus    Obesity (BMI 30 0-34  9)    Gastroparesis diabeticorum (HCC)    Peripheral polyneuropathy    Elevated TSH    Incidental lung nodule, > 3mm and < 8mm    Vertigo    Impaired mobility and ADLs    Diabetic neuropathy (HCC)    [facility] ([facility]    Asterixis    Foot drop, bilateral    Scalp cyst    Per[facility]itis [facility]    End-stage renal disease ([facility]  )     Past Medical History:   Diagnosis Date    Acute kidney injury ([place] [address] )     Anxiety     Cerebellar stroke side undetermined 2015 2015,1/2018    Diabetes type 1, controlled ([place] [address] )     IDDM    Diarrhea     Gastroparesis     History of shingles 2010    History of transfusion 02/2018    Hypertension     Muscle weakness     general unsteadiness    Retinopathy      Past Surgical History:   Procedure Laterality Date    CARDIAC LOOP RECORDER  05/2018    EGD      EYE SURGERY      PERITONEAL CATHETER INSERTION N/A 8/27/2018    Procedure: UNROOF PD CATHETER;  Surgeon: Val Bryant DO;  [facility];  Service: General    NC ESOPHAGOGASTRODUODENOSCOPY TRANSORAL DIAGNOSTIC N/A 4/18/2019    Procedure: ESOPHAGOGASTRODUODENOSCOPY (EGD); Surgeon: Ganga Russell MD;  Location: [facility];   Service: Gastroenterology    NC LAP INSERTION TUNNELED INTRAPERITONEAL CATHETER N/A 8/6/2018    Procedure: LAPAROSCOPIC PD CATHETER PLACEMENT;  Surgeon: Val Bryant DO;  [facility];  Service: General    TONSILLECTOMY         Past Psychiatric History:  Depression: No  Anxiety: No  Psychosis: No    Medications:    Current Outpatient Medications:     aspirin 81 mg chewable tablet, Chew 81 mg daily, Disp: , Rfl:     atorvastatin (LIPITOR) 40 mg tablet, Take 1 tablet (40 mg total) by mouth every evening (Patient taking differently: Take 40 mg by mouth daily at bedtime ), Disp: 90 tablet, Rfl: 1    b complex vitamins capsule, Take 1 capsule by mouth daily, Disp: , Rfl:     B-D ULTRAFINE III SHORT PEN 31G X 8 MM MISC, USE 1 PEN NEEDLE 8 TIMES DAILY, Disp: 100 each, Rfl: 47    calcium acetate (PHOSLO) 667 mg capsule, Take 1 capsule (667 mg total) by mouth 3 (three) times a day with meals, Disp: , Rfl: 0    Cholecalciferol (VITAMIN D) 2000 units CAPS, Take 1 capsule by mouth daily, Disp: , Rfl:     cinacalcet (SENSIPAR) 60 MG tablet, Take 1 tablet (60 mg total) by mouth daily (Patient taking differently: Take 60 mg by mouth every other day ), Disp: , Rfl:     cloNIDine (CATAPRES) 0 1 mg tablet, Take 0 1 mg by mouth as needed May add another 0 1 as needed, Disp: , Rfl:     divalproex sodium (DEPAKOTE ER) 250 mg 24 hr tablet, Take 1 tablet (250 mg total) by mouth daily For 14 days then stop, Disp: 14 tablet, Rfl: 0    doxazosin (CARDURA) 2 mg tablet, Take 1 tablet (2 mg total) by mouth daily at bedtime (Patient taking differently: Take 2 mg by mouth daily ), Disp: 30 tablet, Rfl: 0    escitalopram (LEXAPRO) 10 mg tablet, Take 1 tablet (10 mg total) by mouth daily, Disp: 90 tablet, Rfl: 2    famotidine (PEPCID) 20 mg tablet, Take 1 tablet (20 mg total) by mouth 2 (two) times a day, Disp: 60 tablet, Rfl: 5    gabapentin (NEURONTIN) 100 mg capsule, Take 100 mg by mouth daily at bedtime, Disp: , Rfl: 1    gentamicin (GARAMYCIN) 0 1 % cream, APPLY TO EXIT SITE ONCE DAILY, Disp: 30 g, Rfl: 2    GLUCAGON EMERGENCY 1 MG injection, INJECT 1MG SUBCUTANEOUSLY AS NEEDED (AS DIRECTED)   MAY REPEAT DOSE EVERY 20 MINUTES AS NEEDED, Disp: , Rfl: 3    hydrALAZINE (APRESOLINE) 100 MG tablet, Take 0 5 tablets (50 mg total) by mouth 3 (three) times a day (Patient taking differently: Take 50 mg by mouth 2 (two) times a day ), Disp: , Rfl:     insulin glargine (LANTUS) 100 units/mL subcutaneous injection, Inject 5 Units under the skin daily after breakfast, Disp: , Rfl:     insulin lispro (Admelog SoloStar) 100 units/mL injection pen, Take 4 units with breakfast, 2 units with lunch, and 4 units with dinner, Disp: 3 mL, Rfl: 0    insulin NPH (HumuLIN N,NovoLIN N) 100 Units/mL subcutaneous injection, Inject 4 Units under the skin daily at bedtime, Disp: , Rfl:     labetalol (NORMODYNE) 300 mg tablet, Take 1 tablet (300 mg total) by mouth every 12 (twelve) hours, Disp: 60 tablet, Rfl: 0    lisinopril (ZESTRIL) 40 mg tablet, Take 40 mg by mouth daily, Disp: , Rfl:     metolazone (ZAROXOLYN) 10 mg tablet, Take 10 mg by mouth 3 (three) times a week, Disp: , Rfl:     NIFEdipine ER (ADALAT CC) 60 MG 24 hr tablet, Take 1 tablet (60 mg total) by mouth 2 (two) times a day, Disp: 180 tablet, Rfl: 0    ondansetron (ZOFRAN) 4 mg tablet, Take 1 tablet (4 mg total) by mouth every 8 (eight) hours as needed for nausea or vomiting (Patient taking differently: Take 4 mg by mouth every 8 (eight) hours as needed for nausea or vomiting ), Disp: 30 tablet, Rfl: 0    Probiotic Product (PROBIOTIC DAILY) CAPS, Take 1 capsule by mouth daily at bedtime , Disp: , Rfl:     torsemide (DEMADEX) 100 mg tablet, Take 100 mg by mouth 2 (two) times a day , Disp: , Rfl: 11    AVASTIN 100 MG/4ML, , Disp: , Rfl:     Sucroferric Oxyhydroxide (VELPHORO PO), 500 mg 3 (three) times a day with meals , Disp: , Rfl:     Allergies:   Allergies   Allergen Reactions    Sulfa Antibiotics Rash       Family history:  Family History   Problem Relation Age of Onset   Ashlyn Bahena Breast cancer Mother     Hypertension Mother     Hyperlipidemia Father     Hypertension Father     Leukemia Maternal Grandmother     Hyperlipidemia Maternal Grandfather     Hypertension Maternal Grandfather     Hyperlipidemia Paternal Grandmother     Hypertension Paternal Grandmother     Heart disease Paternal Grandfather         cardiac disorder    Diabetes Paternal Grandfather      There is no family history of seizure, epilepsy or developmental delay  Social History  Living situation:  Lives with parents  Work:  Completed some college, disabled from medical condition  Driving:  Last driving a car was 2 years ago (unable to see correctly)   reports that he quit smoking about 2 years ago  His smoking use included cigarettes  He has a 6 00 pack-year smoking history  He has quit using smokeless tobacco  He reports that he drank alcohol  He reports that he has current or past drug history  Drug: Marijuana  Frequency: 5 00 times per week  Review of Systems  A review of at least 12 organ/systems was obtained by the medical assistant and reviewed by me, including additional positives/negatives:  A review of at least 12 organ/systems was evaluated and there are no complaints  Decision making was of high-complexity due to the patient's high risk condition (seizures), psychiatric and neuropsychological comorbidities, behavioral problems, memory and cognitive problems and medication side effects  The total amount of time spent with the patient was 32 minutes  More than 50% of this time was devoted to counseling and coordination of care  Issues addressed during this virtual video visit included overall management, medication counseling or monitoring (including adverse effects, side effects and risks of antiepileptic medications)     Start time: 6:05PM  End time: 6:37PM

## 2020-07-27 LAB
LEFT EYE DIABETIC RETINOPATHY: NORMAL
RIGHT EYE DIABETIC RETINOPATHY: NORMAL

## 2020-07-28 DIAGNOSIS — Z11.59 SCREENING FOR VIRAL DISEASE: ICD-10-CM

## 2020-07-28 PROCEDURE — U0003 INFECTIOUS AGENT DETECTION BY NUCLEIC ACID (DNA OR RNA); SEVERE ACUTE RESPIRATORY SYNDROME CORONAVIRUS 2 (SARS-COV-2) (CORONAVIRUS DISEASE [COVID-19]), AMPLIFIED PROBE TECHNIQUE, MAKING USE OF HIGH THROUGHPUT TECHNOLOGIES AS DESCRIBED BY CMS-2020-01-R: HCPCS

## 2020-07-29 ENCOUNTER — TELEPHONE (OUTPATIENT)
Dept: NEUROLOGY | Facility: CLINIC | Age: 35
End: 2020-07-29

## 2020-07-29 LAB — SARS-COV-2 RNA SPEC QL NAA+PROBE: NOT DETECTED

## 2020-07-29 NOTE — TELEPHONE ENCOUNTER
----- Message from Abbey Horne MD sent at 7/23/2020  6:27 PM EDT -----  Regarding: follow-up to virtual visit  Please assist in scheduling EEG study  Follow-up with Dr Yudy Lin in 3 months  Please forward after visit summary to the patient      Randee Shawna

## 2020-07-30 ENCOUNTER — TELEPHONE (OUTPATIENT)
Dept: INTERNAL MEDICINE CLINIC | Facility: CLINIC | Age: 35
End: 2020-07-30

## 2020-07-30 ENCOUNTER — HOSPITAL ENCOUNTER (EMERGENCY)
Facility: HOSPITAL | Age: 35
Discharge: HOME | DRG: 919 | End: 2020-07-30
Attending: EMERGENCY MEDICINE
Payer: MEDICARE

## 2020-07-30 VITALS
HEART RATE: 98 BPM | SYSTOLIC BLOOD PRESSURE: 181 MMHG | DIASTOLIC BLOOD PRESSURE: 87 MMHG | OXYGEN SATURATION: 100 % | TEMPERATURE: 100.7 F | RESPIRATION RATE: 20 BRPM

## 2020-07-30 DIAGNOSIS — N18.9 CHRONIC RENAL INSUFFICIENCY: ICD-10-CM

## 2020-07-30 DIAGNOSIS — A08.4 VIRAL GASTROENTERITIS: ICD-10-CM

## 2020-07-30 DIAGNOSIS — D64.9 CHRONIC ANEMIA: ICD-10-CM

## 2020-07-30 DIAGNOSIS — R19.7 DIARRHEA, UNSPECIFIED TYPE: Primary | ICD-10-CM

## 2020-07-30 DIAGNOSIS — E87.5 HYPERKALEMIA: ICD-10-CM

## 2020-07-30 DIAGNOSIS — R50.9 FEVER: ICD-10-CM

## 2020-07-30 LAB
ALBUMIN SERPL BCP-MCNC: 3 G/DL (ref 3.5–5)
ALP SERPL-CCNC: 68 U/L (ref 46–116)
ALT SERPL W P-5'-P-CCNC: 26 U/L (ref 12–78)
ANION GAP SERPL CALCULATED.3IONS-SCNC: 8 MMOL/L (ref 4–13)
AST SERPL W P-5'-P-CCNC: 17 U/L (ref 5–45)
BASOPHILS # BLD AUTO: 0.06 THOUSANDS/ΜL (ref 0–0.1)
BASOPHILS NFR BLD AUTO: 1 % (ref 0–1)
BILIRUB SERPL-MCNC: 0.51 MG/DL (ref 0.2–1)
BUN SERPL-MCNC: 37 MG/DL (ref 5–25)
CALCIUM SERPL-MCNC: 9.2 MG/DL (ref 8.3–10.1)
CHLORIDE SERPL-SCNC: 98 MMOL/L (ref 100–108)
CO2 SERPL-SCNC: 31 MMOL/L (ref 21–32)
CREAT SERPL-MCNC: 12.41 MG/DL (ref 0.6–1.3)
EOSINOPHIL # BLD AUTO: 0.53 THOUSAND/ΜL (ref 0–0.61)
EOSINOPHIL NFR BLD AUTO: 8 % (ref 0–6)
ERYTHROCYTE [DISTWIDTH] IN BLOOD BY AUTOMATED COUNT: 14.9 % (ref 11.6–15.1)
GFR SERPL CREATININE-BSD FRML MDRD: 5 ML/MIN/1.73SQ M
GLUCOSE SERPL-MCNC: 150 MG/DL (ref 65–140)
HCT VFR BLD AUTO: 25.5 % (ref 36.5–49.3)
HGB BLD-MCNC: 8.3 G/DL (ref 12–17)
IMM GRANULOCYTES # BLD AUTO: 0.02 THOUSAND/UL (ref 0–0.2)
IMM GRANULOCYTES NFR BLD AUTO: 0 % (ref 0–2)
LACTATE SERPL-SCNC: 1 MMOL/L (ref 0.5–2)
LIPASE SERPL-CCNC: 41 U/L (ref 73–393)
LYMPHOCYTES # BLD AUTO: 1.51 THOUSANDS/ΜL (ref 0.6–4.47)
LYMPHOCYTES NFR BLD AUTO: 23 % (ref 14–44)
MCH RBC QN AUTO: 32.4 PG (ref 26.8–34.3)
MCHC RBC AUTO-ENTMCNC: 32.5 G/DL (ref 31.4–37.4)
MCV RBC AUTO: 100 FL (ref 82–98)
MONOCYTES # BLD AUTO: 0.6 THOUSAND/ΜL (ref 0.17–1.22)
MONOCYTES NFR BLD AUTO: 9 % (ref 4–12)
NEUTROPHILS # BLD AUTO: 3.96 THOUSANDS/ΜL (ref 1.85–7.62)
NEUTS SEG NFR BLD AUTO: 59 % (ref 43–75)
NRBC BLD AUTO-RTO: 0 /100 WBCS
PLATELET # BLD AUTO: 320 THOUSANDS/UL (ref 149–390)
PMV BLD AUTO: 9.8 FL (ref 8.9–12.7)
POTASSIUM SERPL-SCNC: 5.6 MMOL/L (ref 3.5–5.3)
PROT SERPL-MCNC: 5.7 G/DL (ref 6.4–8.2)
RBC # BLD AUTO: 2.56 MILLION/UL (ref 3.88–5.62)
SODIUM SERPL-SCNC: 137 MMOL/L (ref 136–145)
WBC # BLD AUTO: 6.68 THOUSAND/UL (ref 4.31–10.16)

## 2020-07-30 PROCEDURE — 96360 HYDRATION IV INFUSION INIT: CPT

## 2020-07-30 PROCEDURE — 99284 EMERGENCY DEPT VISIT MOD MDM: CPT

## 2020-07-30 PROCEDURE — 36415 COLL VENOUS BLD VENIPUNCTURE: CPT | Performed by: EMERGENCY MEDICINE

## 2020-07-30 PROCEDURE — 80053 COMPREHEN METABOLIC PANEL: CPT | Performed by: EMERGENCY MEDICINE

## 2020-07-30 PROCEDURE — 87040 BLOOD CULTURE FOR BACTERIA: CPT | Performed by: EMERGENCY MEDICINE

## 2020-07-30 PROCEDURE — 96361 HYDRATE IV INFUSION ADD-ON: CPT

## 2020-07-30 PROCEDURE — 83690 ASSAY OF LIPASE: CPT | Performed by: EMERGENCY MEDICINE

## 2020-07-30 PROCEDURE — 85025 COMPLETE CBC W/AUTO DIFF WBC: CPT | Performed by: EMERGENCY MEDICINE

## 2020-07-30 PROCEDURE — 99284 EMERGENCY DEPT VISIT MOD MDM: CPT | Performed by: EMERGENCY MEDICINE

## 2020-07-30 PROCEDURE — 87493 C DIFF AMPLIFIED PROBE: CPT | Performed by: EMERGENCY MEDICINE

## 2020-07-30 PROCEDURE — 83605 ASSAY OF LACTIC ACID: CPT | Performed by: EMERGENCY MEDICINE

## 2020-07-30 RX ORDER — ACETAMINOPHEN 325 MG/1
650 TABLET ORAL ONCE
Status: DISCONTINUED | OUTPATIENT
Start: 2020-07-30 | End: 2020-07-30 | Stop reason: HOSPADM

## 2020-07-30 RX ORDER — SODIUM POLYSTYRENE SULFONATE 4.1 MEQ/G
30 POWDER, FOR SUSPENSION ORAL; RECTAL ONCE
Status: COMPLETED | OUTPATIENT
Start: 2020-07-30 | End: 2020-07-30

## 2020-07-30 RX ORDER — ONDANSETRON 2 MG/ML
4 INJECTION INTRAMUSCULAR; INTRAVENOUS ONCE
Status: DISCONTINUED | OUTPATIENT
Start: 2020-07-30 | End: 2020-07-30 | Stop reason: HOSPADM

## 2020-07-30 RX ADMIN — SODIUM POLYSTYRENE SULFONATE 30 G: 1 POWDER ORAL; RECTAL at 16:07

## 2020-07-30 RX ADMIN — SODIUM CHLORIDE 1000 ML: 0.9 INJECTION, SOLUTION INTRAVENOUS at 15:01

## 2020-07-30 RX ADMIN — SODIUM CHLORIDE 1000 ML: 0.9 INJECTION, SOLUTION INTRAVENOUS at 16:04

## 2020-07-30 NOTE — ED PROVIDER NOTES
History  Chief Complaint   Patient presents with    Diarrhea     reports diarreah x10 days  reports vomiting starting yesterday  reports was on abx in  for peritonitis  PCP concerned for cdiff  Patient presents to the emergency department with multiple episodes of diarrhea totaling 3-4 per day for the last week  He had 1 episode of vomiting throughout this but is currently been keeping liquids down  He denies fever chills or rash  He was recently admitted in  for peritonitis  Patient does perform peritoneal dialysis nightly  He denies chest pain sob  Denies trauma fall or injury  Denies ill contacts  Denies foreign travel  Denies URI symptoms  Prior to Admission Medications   Prescriptions Last Dose Informant Patient Reported? Taking? AVASTIN 100 MG/4ML   Yes No   B-D ULTRAFINE III SHORT PEN 31G X 8 MM MISC   No No   Sig: USE 1 PEN NEEDLE 8 TIMES DAILY   Cholecalciferol (VITAMIN D) 2000 units CAPS  Mother Yes No   Sig: Take 1 capsule by mouth daily   GLUCAGON EMERGENCY 1 MG injection   Yes No   Sig: INJECT 1MG SUBCUTANEOUSLY AS NEEDED (AS DIRECTED)   MAY REPEAT DOSE EVERY 20 MINUTES AS NEEDED   NIFEdipine ER (ADALAT CC) 60 MG 24 hr tablet   No No   Sig: Take 1 tablet (60 mg total) by mouth 2 (two) times a day   Probiotic Product (PROBIOTIC DAILY) CAPS  Self Yes No   Sig: Take 1 capsule by mouth daily at bedtime    Sucroferric Oxyhydroxide (VELPHORO PO)   Yes No   Si mg 3 (three) times a day with meals    aspirin 81 mg chewable tablet  Mother Yes No   Sig: Chew 81 mg daily   atorvastatin (LIPITOR) 40 mg tablet   No No   Sig: Take 1 tablet (40 mg total) by mouth every evening   Patient taking differently: Take 40 mg by mouth daily at bedtime    b complex vitamins capsule  Mother Yes No   Sig: Take 1 capsule by mouth daily   calcium acetate (PHOSLO) 667 mg capsule  Mother No No   Sig: Take 1 capsule (667 mg total) by mouth 3 (three) times a day with meals   cinacalcet (SENSIPAR) 60 MG tablet   No No   Sig: Take 1 tablet (60 mg total) by mouth daily   Patient taking differently: Take 60 mg by mouth every other day    cloNIDine (CATAPRES) 0 1 mg tablet  Self Yes No   Sig: Take 0 1 mg by mouth as needed May add another 0 1 as needed   divalproex sodium (DEPAKOTE ER) 250 mg 24 hr tablet   No No   Sig: Take 1 tablet (250 mg total) by mouth daily For 14 days then stop   doxazosin (CARDURA) 2 mg tablet  Mother No No   Sig: Take 1 tablet (2 mg total) by mouth daily at bedtime   Patient taking differently: Take 2 mg by mouth daily    escitalopram (LEXAPRO) 10 mg tablet   No No   Sig: Take 1 tablet (10 mg total) by mouth daily   famotidine (PEPCID) 20 mg tablet   No No   Sig: Take 1 tablet (20 mg total) by mouth 2 (two) times a day   gabapentin (NEURONTIN) 100 mg capsule   Yes No   Sig: Take 100 mg by mouth daily at bedtime   gentamicin (GARAMYCIN) 0 1 % cream   No No   Sig: APPLY TO EXIT SITE ONCE DAILY   hydrALAZINE (APRESOLINE) 100 MG tablet  Self No No   Sig: Take 0 5 tablets (50 mg total) by mouth 3 (three) times a day   Patient taking differently: Take 50 mg by mouth 2 (two) times a day    insulin NPH (HumuLIN N,NovoLIN N) 100 Units/mL subcutaneous injection   Yes No   Sig: Inject 4 Units under the skin daily at bedtime   insulin glargine (LANTUS) 100 units/mL subcutaneous injection   Yes No   Sig: Inject 5 Units under the skin daily after breakfast   insulin lispro (Admelog SoloStar) 100 units/mL injection pen   No No   Sig: Take 4 units with breakfast, 2 units with lunch, and 4 units with dinner   labetalol (NORMODYNE) 300 mg tablet   No No   Sig: Take 1 tablet (300 mg total) by mouth every 12 (twelve) hours   lisinopril (ZESTRIL) 40 mg tablet  Mother Yes No   Sig: Take 40 mg by mouth daily   metolazone (ZAROXOLYN) 10 mg tablet   Yes No   Sig: Take 10 mg by mouth 3 (three) times a week   ondansetron (ZOFRAN) 4 mg tablet   No No   Sig: Take 1 tablet (4 mg total) by mouth every 8 (eight) hours as needed for nausea or vomiting   Patient taking differently: Take 4 mg by mouth every 8 (eight) hours as needed for nausea or vomiting    torsemide (DEMADEX) 100 mg tablet  Mother Yes No   Sig: Take 100 mg by mouth 2 (two) times a day       Facility-Administered Medications: None       Past Medical History:   Diagnosis Date    Acute kidney injury ([facility] [address] [facility]     Anxiety     Cerebellar stroke side undetermined 2015 2015,1/2018    Diabetes type 1, controlled ([facility] [address] [facility]     IDDM    Diarrhea     Gastroparesis     History of shingles 2010    History of transfusion 02/2018    Hypertension     Muscle weakness     general unsteadiness    Retinopathy        Past Surgical History:   Procedure Laterality Date    CARDIAC LOOP RECORDER  05/2018    EGD      EYE SURGERY      PERITONEAL CATHETER INSERTION N/A 8/27/2018    Procedure: UNROOF PD CATHETER;  Surgeon: Danie Fonseca DO;  [facility];  Service: General    [place] ESOPHAGOGASTRODUODENOSCOPY TRANSORAL DIAGNOSTIC N/A 4/18/2019    Procedure: ESOPHAGOGASTRODUODENOSCOPY (EGD); Surgeon: Jed Willis MD;  Location: [facility]; Service: Gastroenterology    [place] LAP INSERTION TUNNELED INTRAPERITONEAL CATHETER N/A 8/6/2018    Procedure: LAPAROSCOPIC PD CATHETER PLACEMENT;  Surgeon: Danie Fonseca DO;  [facility];  Service: General    TONSILLECTOMY         Family History   Problem Relation Age of Onset   Mk Pert Breast cancer Mother     Hypertension Mother     Hyperlipidemia Father     Hypertension Father     Leukemia Maternal Grandmother     Hyperlipidemia Maternal Grandfather     Hypertension Maternal Grandfather     Hyperlipidemia Paternal Grandmother     Hypertension Paternal Grandmother     Heart disease Paternal Grandfather         cardiac disorder    Diabetes Paternal Grandfather      I have reviewed and agree with the history as documented      E-Cigarette/Vaping    E-Cigarette Use Never User      E-Cigarette/Vaping Substances    Nicotine No  THC No     CBD No     Flavoring No     Other No     Unknown No      Social History     Tobacco Use    Smoking status: Former Smoker     Packs/day: 0 50     Years: 12 00     Pack years: 6 00     Types: Cigarettes     Last attempt to quit: 2018     Years since quittin 4    Smokeless tobacco: Former User    Tobacco comment: quit 2018   Substance Use Topics    Alcohol use: Not Currently     Comment: rarely    Drug use: Yes     Frequency: 5 0 times per week     Types: Marijuana     Comment: medical       Review of Systems   Constitutional: Negative  Negative for activity change, appetite change, chills, diaphoresis, fatigue and fever  HENT: Negative  Negative for congestion, drooling, ear discharge, rhinorrhea, sinus pressure, sinus pain, trouble swallowing and voice change  Eyes: Negative  Negative for photophobia and visual disturbance  Respiratory: Negative  Negative for cough, chest tightness, shortness of breath, wheezing and stridor  Cardiovascular: Negative  Negative for chest pain, palpitations and leg swelling  Gastrointestinal: Positive for diarrhea and vomiting  Negative for abdominal distention, abdominal pain, blood in stool and constipation  Endocrine: Negative  Genitourinary: Negative  Negative for discharge, flank pain, penile pain, penile swelling and urgency  Musculoskeletal: Negative  Negative for arthralgias, back pain and neck pain  Skin: Negative  Negative for rash and wound  Allergic/Immunologic: Negative  Neurological: Negative  Negative for dizziness, tremors, seizures, syncope, facial asymmetry, speech difficulty, weakness, light-headedness, numbness and headaches  Hematological: Negative  Does not bruise/bleed easily  Psychiatric/Behavioral: Negative  Negative for agitation, behavioral problems and confusion  Physical Exam  Physical Exam   Constitutional: He is oriented to person, place, and time   He appears well-developed and well-nourished  No distress  Nontoxic appearance  No respiratory distress  Patient does not appear washed out or frail  Patient able to engage in normal conversation and answer simple questions appropriately  HENT:   Head: Normocephalic and atraumatic  Right Ear: External ear normal    Left Ear: External ear normal    Mouth/Throat: Oropharynx is clear and moist    Eyes: Pupils are equal, round, and reactive to light  Conjunctivae and EOM are normal    Neck: Normal range of motion  Neck supple  Cardiovascular: Normal rate, regular rhythm, normal heart sounds and intact distal pulses  Pulmonary/Chest: Effort normal and breath sounds normal  No stridor  No respiratory distress  He has no wheezes  He has no rales  He exhibits no tenderness  Abdominal: Soft  Bowel sounds are normal  He exhibits no distension and no mass  There is no tenderness  There is no rebound and no guarding  No hernia  Musculoskeletal: Normal range of motion  He exhibits no edema, tenderness or deformity  Neurological: He is alert and oriented to person, place, and time  He has normal reflexes  He displays normal reflexes  No cranial nerve deficit or sensory deficit  He exhibits normal muscle tone  Coordination normal    Skin: Skin is warm and dry  Capillary refill takes less than 2 seconds  No rash noted  He is not diaphoretic  No erythema  No pallor  Psychiatric: He has a normal mood and affect  His behavior is normal  Judgment and thought content normal    Nursing note and vitals reviewed        Vital Signs  ED Triage Vitals [07/30/20 1304]   Temperature Pulse Respirations Blood Pressure SpO2   (!) 100 7 °F (38 2 °C) 98 20 (!) 181/87 100 %      Temp Source Heart Rate Source Patient Position - Orthostatic VS BP Location FiO2 (%)   Oral -- -- -- --      Pain Score       No Pain           Vitals:    07/30/20 1304   BP: (!) 181/87   Pulse: 98         Visual Acuity      ED Medications  Medications   ondansetron (ZOFRAN) injection 4 mg (4 mg Intravenous Not Given 7/30/20 1501)   sodium chloride 0 9 % bolus 1,000 mL (1,000 mL Intravenous New Bag 7/30/20 1604)   acetaminophen (TYLENOL) tablet 650 mg (has no administration in time range)   sodium chloride 0 9 % bolus 1,000 mL (0 mL Intravenous Stopped 7/30/20 1603)   sodium polystyrene (KAYEXALATE) powder 30 g (30 g Oral Given 7/30/20 1607)       Diagnostic Studies  Results Reviewed     Procedure Component Value Units Date/Time    Comprehensive metabolic panel [[id number]]  (Abnormal) Collected:  07/30/20 1454    Lab Status:  Final result Specimen:  Blood from Hand, Right Updated:  07/30/20 1528     Sodium 137 mmol/L      Potassium 5 6 mmol/L      Chloride 98 mmol/L      CO2 31 mmol/L      ANION GAP 8 mmol/L      BUN 37 mg/dL      Creatinine 12 41 mg/dL      Glucose 150 mg/dL      Calcium 9 2 mg/dL      AST 17 U/L      ALT 26 U/L      Alkaline Phosphatase 68 U/L      Total Protein 5 7 g/dL      Albumin 3 0 g/dL      Total Bilirubin 0 51 mg/dL      eGFR 5 ml/min/1 73sq m     Narrative:       National Kidney Disease Foundation guidelines for Chronic Kidney Disease (CKD):     Stage 1 with normal or high GFR (GFR > 90 mL/min/1 73 square meters)    Stage 2 Mild CKD (GFR = 60-89 mL/min/1 73 square meters)    Stage 3A Moderate CKD (GFR = 45-59 mL/min/1 73 square meters)    Stage 3B Moderate CKD (GFR = 30-44 mL/min/1 73 square meters)    Stage 4 Severe CKD (GFR = 15-29 mL/min/1 73 square meters)    Stage 5 End Stage CKD (GFR <15 mL/min/1 73 square meters)  Note: GFR calculation is accurate only with a steady state creatinine    Lipase [[id number]]  (Abnormal) Collected:  07/30/20 1454    Lab Status:  Final result Specimen:  Blood from Hand, Right Updated:  07/30/20 1528     Lipase 41 u/L     Lactic acid [[id number]]  (Normal) Collected:  07/30/20 1454    Lab Status:  Final result Specimen:  Blood from Hand, Right Updated:  07/30/20 1522     LACTIC ACID 1 0 mmol/L     Narrative:       Result may be elevated if tourniquet was used during collection  CBC and differential [[id number]]  (Abnormal) Collected:  07/30/20 1454    Lab Status:  Final result Specimen:  Blood from Hand, Right Updated:  07/30/20 1508     WBC 6 68 Thousand/uL      RBC 2 56 Million/uL      Hemoglobin 8 3 g/dL      Hematocrit 25 5 %       fL      MCH 32 4 pg      MCHC 32 5 g/dL      RDW 14 9 %      MPV 9 8 fL      Platelets 477 Thousands/uL      nRBC 0 /100 WBCs      Neutrophils Relative 59 %      Immat GRANS % 0 %      Lymphocytes Relative 23 %      Monocytes Relative 9 %      Eosinophils Relative 8 %      Basophils Relative 1 %      Neutrophils Absolute 3 96 Thousands/µL      Immature Grans Absolute 0 02 Thousand/uL      Lymphocytes Absolute 1 51 Thousands/µL      Monocytes Absolute 0 60 Thousand/µL      Eosinophils Absolute 0 53 Thousand/µL      Basophils Absolute 0 06 Thousands/µL     Blood culture #1 [[id number]] Collected:  07/30/20 1501    Lab Status: In process Specimen:  Blood from Arm, Right Updated:  07/30/20 1501    Blood culture #2 [[id number]] Collected:  07/30/20 1454    Lab Status: In process Specimen:  Blood from Hand, Right Updated:  07/30/20 1458    Clostridium difficile toxin by PCR with EIA [[id number]]     Lab Status:  No result Specimen:  Stool from Per Rectum                  No orders to display              Procedures  Procedures         ED Course  ED Course as of Jul 30 1634   Thu Jul 30, 2020   1630 Patient is stable for discharge  Patient given 2 L of saline and Kayexalate for his mildly elevated potassium  He will undergo peritoneal dialysis tonight  No evidence of peritonitis  Patient gave diarrheal sample to rule out C diff but I have low clinical suspicion as his last antibiotics was in June  Discussed signs and symptoms requiring return to the emergency department                                                  MDM      Disposition  Final diagnoses:   Diarrhea, unspecified type   Fever   Viral gastroenteritis   Hyperkalemia   Chronic anemia   Chronic renal insufficiency     Time reflects when diagnosis was documented in both MDM as applicable and the Disposition within this note     Time User Action Codes Description Comment    7/30/2020  4:31 PM Joce De La Cruz Neel Add [R19 7] Diarrhea, unspecified type     7/30/2020  4:31 PM Rockwood Balk Add [R50 9] Fever     7/30/2020  4:32 PM Rockwood Balk Add [A08 4] Viral gastroenteritis     7/30/2020  4:32 PM Rockwood Balk Add [E87 5] Hyperkalemia     7/30/2020  4:32 PM Rockwood Balk Add [D64 9] Chronic anemia     7/30/2020  4:32 PM Rockwood Balk Add [N18 9] Chronic renal insufficiency       ED Disposition     ED Disposition Condition Date/Time Comment    Discharge Stable u Jul 30, 2020  4:31 PM Valentin Metcalf discharge to home/self care  Follow-up Information     Follow up With Specialties Details Why [id number] Tamia Dennison, DO Internal Medicine Schedule an appointment as soon as possible for a visit  As needed [address]  [address] [place] [address]  [phone]            Patient's Medications   Discharge Prescriptions    No medications on file     No discharge procedures on file      PDMP Review       Value Time User    PDMP Reviewed  Yes 4/19/2020 10:30 PM Dimitri Anders MD          ED Provider  Electronically Signed by           Gabriela Greer MD  07/30/20 7172

## 2020-07-31 ENCOUNTER — APPOINTMENT (EMERGENCY)
Dept: CT IMAGING | Facility: HOSPITAL | Age: 35
DRG: 919 | End: 2020-07-31
Payer: MEDICARE

## 2020-07-31 ENCOUNTER — HOSPITAL ENCOUNTER (INPATIENT)
Facility: HOSPITAL | Age: 35
LOS: 6 days | Discharge: HOME | DRG: 919 | End: 2020-08-06
Attending: EMERGENCY MEDICINE | Admitting: HOSPITALIST
Payer: MEDICARE

## 2020-07-31 DIAGNOSIS — E55.9 VITAMIN D DEFICIENCY: ICD-10-CM

## 2020-07-31 DIAGNOSIS — K65.9 PERITONITIS (HCC): ICD-10-CM

## 2020-07-31 DIAGNOSIS — E72.11 HYPERHOMOCYSTEINEMIA (HCC): ICD-10-CM

## 2020-07-31 DIAGNOSIS — Z99.2 PERITONEAL DIALYSIS CATHETER IN PLACE (HCC): ICD-10-CM

## 2020-07-31 DIAGNOSIS — I63.9 CEREBROVASCULAR ACCIDENT (CVA), UNSPECIFIED MECHANISM (HCC): ICD-10-CM

## 2020-07-31 DIAGNOSIS — K31.84 GASTROPARESIS DIABETICORUM (HCC): ICD-10-CM

## 2020-07-31 DIAGNOSIS — H53.30 BINOCULAR VISUAL DISTURBANCE: ICD-10-CM

## 2020-07-31 DIAGNOSIS — N18.2 ACUTE RENAL FAILURE WITH ACUTE TUBULAR NECROSIS SUPERIMPOSED ON STAGE 2 CHRONIC KIDNEY DISEASE (HCC): ICD-10-CM

## 2020-07-31 DIAGNOSIS — N18.3 ACUTE RENAL FAILURE SUPERIMPOSED ON STAGE 3 CHRONIC KIDNEY DISEASE, UNSPECIFIED ACUTE RENAL FAILURE TYPE: ICD-10-CM

## 2020-07-31 DIAGNOSIS — Z86.73 HISTORY OF LACUNAR CEREBROVASCULAR ACCIDENT (CVA): ICD-10-CM

## 2020-07-31 DIAGNOSIS — I63.9 CEREBROVASCULAR ACCIDENT (CVA) OF LEFT PONTINE STRUCTURE (HCC): ICD-10-CM

## 2020-07-31 DIAGNOSIS — N18.6 END-STAGE RENAL DISEASE ON PERITONEAL DIALYSIS (HCC): ICD-10-CM

## 2020-07-31 DIAGNOSIS — E11.43 GASTROPARESIS DIABETICORUM (HCC): ICD-10-CM

## 2020-07-31 DIAGNOSIS — E10.43 TYPE 1 DIABETES MELLITUS WITH DIABETIC GASTROPATHY (HCC): ICD-10-CM

## 2020-07-31 DIAGNOSIS — N18.4 CHRONIC KIDNEY DISEASE, STAGE 4 (SEVERE) (HCC): Chronic | ICD-10-CM

## 2020-07-31 DIAGNOSIS — E10.21 TYPE 1 DIABETES MELLITUS WITH DIABETIC NEPHROPATHY, WITH LONG-TERM CURRENT USE OF INSULIN (HCC): ICD-10-CM

## 2020-07-31 DIAGNOSIS — I16.0 HYPERTENSIVE URGENCY: ICD-10-CM

## 2020-07-31 DIAGNOSIS — N17.9 ACUTE RENAL FAILURE SUPERIMPOSED ON STAGE 3 CHRONIC KIDNEY DISEASE, UNSPECIFIED ACUTE RENAL FAILURE TYPE: ICD-10-CM

## 2020-07-31 DIAGNOSIS — H46.9 OPTIC NEURITIS DUE TO MULTIPLE SCLEROSIS (HCC): ICD-10-CM

## 2020-07-31 DIAGNOSIS — I10 ESSENTIAL HYPERTENSION: ICD-10-CM

## 2020-07-31 DIAGNOSIS — R80.1 PERSISTENT PROTEINURIA: ICD-10-CM

## 2020-07-31 DIAGNOSIS — H51.0 BINOCULAR VISION DISORDER WITH CONJUGATE GAZE PALSY: ICD-10-CM

## 2020-07-31 DIAGNOSIS — E83.39 HYPERPHOSPHATEMIA: ICD-10-CM

## 2020-07-31 DIAGNOSIS — G35 OPTIC NEURITIS DUE TO MULTIPLE SCLEROSIS (HCC): ICD-10-CM

## 2020-07-31 DIAGNOSIS — Z99.2 END-STAGE RENAL DISEASE ON PERITONEAL DIALYSIS (HCC): ICD-10-CM

## 2020-07-31 DIAGNOSIS — R79.89 AZOTEMIA: ICD-10-CM

## 2020-07-31 DIAGNOSIS — R11.0 NAUSEA: ICD-10-CM

## 2020-07-31 DIAGNOSIS — H53.8 BLURRED VISION: ICD-10-CM

## 2020-07-31 DIAGNOSIS — N18.30 ACUTE RENAL FAILURE WITH ACUTE TUBULAR NECROSIS SUPERIMPOSED ON STAGE 3 CHRONIC KIDNEY DISEASE (HCC): ICD-10-CM

## 2020-07-31 DIAGNOSIS — K31.9 TYPE 1 DIABETES MELLITUS WITH DIABETIC GASTROPATHY (HCC): ICD-10-CM

## 2020-07-31 DIAGNOSIS — N17.0 ACUTE RENAL FAILURE WITH ACUTE TUBULAR NECROSIS SUPERIMPOSED ON STAGE 3 CHRONIC KIDNEY DISEASE (HCC): ICD-10-CM

## 2020-07-31 DIAGNOSIS — K52.9 COLITIS: Primary | ICD-10-CM

## 2020-07-31 DIAGNOSIS — N17.0 ACUTE RENAL FAILURE WITH ACUTE TUBULAR NECROSIS SUPERIMPOSED ON STAGE 2 CHRONIC KIDNEY DISEASE (HCC): ICD-10-CM

## 2020-07-31 PROBLEM — R10.9 ABDOMINAL PAIN: Status: ACTIVE | Noted: 2020-07-31

## 2020-07-31 LAB
ALBUMIN SERPL BCP-MCNC: 2.6 G/DL (ref 3.5–5)
ALP SERPL-CCNC: 66 U/L (ref 46–116)
ALT SERPL W P-5'-P-CCNC: 22 U/L (ref 12–78)
ANION GAP SERPL CALCULATED.3IONS-SCNC: 7 MMOL/L (ref 4–13)
APPEARANCE FLD: ABNORMAL
AST SERPL W P-5'-P-CCNC: 16 U/L (ref 5–45)
BASOPHILS # BLD AUTO: 0.03 THOUSANDS/ΜL (ref 0–0.1)
BASOPHILS NFR BLD AUTO: 0 % (ref 0–1)
BILIRUB SERPL-MCNC: 0.42 MG/DL (ref 0.2–1)
BUN SERPL-MCNC: 36 MG/DL (ref 5–25)
C DIFF TOX GENS STL QL NAA+PROBE: NEGATIVE
CALCIUM SERPL-MCNC: 8.6 MG/DL (ref 8.3–10.1)
CHLORIDE SERPL-SCNC: 99 MMOL/L (ref 100–108)
CO2 SERPL-SCNC: 31 MMOL/L (ref 21–32)
COLOR FLD: ABNORMAL
CREAT SERPL-MCNC: 11.89 MG/DL (ref 0.6–1.3)
EOSINOPHIL # BLD AUTO: 0.55 THOUSAND/ΜL (ref 0–0.61)
EOSINOPHIL NFR BLD AUTO: 5 % (ref 0–6)
EOSINOPHIL NFR FLD MANUAL: 3 %
ERYTHROCYTE [DISTWIDTH] IN BLOOD BY AUTOMATED COUNT: 15.3 % (ref 11.6–15.1)
GFR SERPL CREATININE-BSD FRML MDRD: 5 ML/MIN/1.73SQ M
GLUCOSE FLD-MCNC: 308 MG/DL
GLUCOSE SERPL-MCNC: 183 MG/DL (ref 65–140)
GLUCOSE SERPL-MCNC: 258 MG/DL (ref 65–140)
HCT VFR BLD AUTO: 23.9 % (ref 36.5–49.3)
HGB BLD-MCNC: 7.7 G/DL (ref 12–17)
IMM GRANULOCYTES # BLD AUTO: 0.03 THOUSAND/UL (ref 0–0.2)
IMM GRANULOCYTES NFR BLD AUTO: 0 % (ref 0–2)
LACTATE SERPL-SCNC: 1.1 MMOL/L (ref 0.5–2)
LIPASE SERPL-CCNC: 48 U/L (ref 73–393)
LYMPHOCYTES # BLD AUTO: 0.87 THOUSANDS/ΜL (ref 0.6–4.47)
LYMPHOCYTES NFR BLD AUTO: 1 %
LYMPHOCYTES NFR BLD AUTO: 8 % (ref 14–44)
MCH RBC QN AUTO: 32.4 PG (ref 26.8–34.3)
MCHC RBC AUTO-ENTMCNC: 32.2 G/DL (ref 31.4–37.4)
MCV RBC AUTO: 100 FL (ref 82–98)
MONOCYTES # BLD AUTO: 0.48 THOUSAND/ΜL (ref 0.17–1.22)
MONOCYTES NFR BLD AUTO: 25 %
MONOCYTES NFR BLD AUTO: 4 % (ref 4–12)
NEUTROPHILS # BLD AUTO: 9.08 THOUSANDS/ΜL (ref 1.85–7.62)
NEUTS SEG NFR BLD AUTO: 71 %
NEUTS SEG NFR BLD AUTO: 83 % (ref 43–75)
NRBC BLD AUTO-RTO: 0 /100 WBCS
PLATELET # BLD AUTO: 265 THOUSANDS/UL (ref 149–390)
PMV BLD AUTO: 9.8 FL (ref 8.9–12.7)
POTASSIUM SERPL-SCNC: 5.3 MMOL/L (ref 3.5–5.3)
PROCALCITONIN SERPL-MCNC: 0.27 NG/ML
PROT SERPL-MCNC: 5.3 G/DL (ref 6.4–8.2)
RBC # BLD AUTO: 2.38 MILLION/UL (ref 3.88–5.62)
SITE: ABNORMAL
SODIUM SERPL-SCNC: 137 MMOL/L (ref 136–145)
TOTAL CELLS COUNTED SPEC: 100
VALPROATE SERPL-MCNC: <3 UG/ML (ref 50–100)
WBC # BLD AUTO: 11.04 THOUSAND/UL (ref 4.31–10.16)
WBC # FLD MANUAL: 7220 /UL

## 2020-07-31 PROCEDURE — 87081 CULTURE SCREEN ONLY: CPT | Performed by: INTERNAL MEDICINE

## 2020-07-31 PROCEDURE — 99285 EMERGENCY DEPT VISIT HI MDM: CPT

## 2020-07-31 PROCEDURE — 36415 COLL VENOUS BLD VENIPUNCTURE: CPT | Performed by: FAMILY MEDICINE

## 2020-07-31 PROCEDURE — 99285 EMERGENCY DEPT VISIT HI MDM: CPT | Performed by: EMERGENCY MEDICINE

## 2020-07-31 PROCEDURE — 96374 THER/PROPH/DIAG INJ IV PUSH: CPT

## 2020-07-31 PROCEDURE — 87070 CULTURE OTHR SPECIMN AEROBIC: CPT | Performed by: FAMILY MEDICINE

## 2020-07-31 PROCEDURE — 74176 CT ABD & PELVIS W/O CONTRAST: CPT

## 2020-07-31 PROCEDURE — 87077 CULTURE AEROBIC IDENTIFY: CPT | Performed by: FAMILY MEDICINE

## 2020-07-31 PROCEDURE — 82948 REAGENT STRIP/BLOOD GLUCOSE: CPT

## 2020-07-31 PROCEDURE — 80164 ASSAY DIPROPYLACETIC ACD TOT: CPT | Performed by: FAMILY MEDICINE

## 2020-07-31 PROCEDURE — 96375 TX/PRO/DX INJ NEW DRUG ADDON: CPT

## 2020-07-31 PROCEDURE — 99223 1ST HOSP IP/OBS HIGH 75: CPT | Performed by: PHYSICIAN ASSISTANT

## 2020-07-31 PROCEDURE — 89051 BODY FLUID CELL COUNT: CPT | Performed by: FAMILY MEDICINE

## 2020-07-31 PROCEDURE — 82945 GLUCOSE OTHER FLUID: CPT | Performed by: FAMILY MEDICINE

## 2020-07-31 PROCEDURE — 85025 COMPLETE CBC W/AUTO DIFF WBC: CPT | Performed by: FAMILY MEDICINE

## 2020-07-31 PROCEDURE — 87186 SC STD MICRODIL/AGAR DIL: CPT | Performed by: FAMILY MEDICINE

## 2020-07-31 PROCEDURE — 83605 ASSAY OF LACTIC ACID: CPT | Performed by: FAMILY MEDICINE

## 2020-07-31 PROCEDURE — 87040 BLOOD CULTURE FOR BACTERIA: CPT | Performed by: FAMILY MEDICINE

## 2020-07-31 PROCEDURE — 87205 SMEAR GRAM STAIN: CPT | Performed by: FAMILY MEDICINE

## 2020-07-31 PROCEDURE — 80053 COMPREHEN METABOLIC PANEL: CPT | Performed by: FAMILY MEDICINE

## 2020-07-31 PROCEDURE — 87147 CULTURE TYPE IMMUNOLOGIC: CPT | Performed by: FAMILY MEDICINE

## 2020-07-31 PROCEDURE — 84145 PROCALCITONIN (PCT): CPT | Performed by: FAMILY MEDICINE

## 2020-07-31 PROCEDURE — 83690 ASSAY OF LIPASE: CPT | Performed by: FAMILY MEDICINE

## 2020-07-31 RX ORDER — TORSEMIDE 100 MG/1
100 TABLET ORAL
Status: DISCONTINUED | OUTPATIENT
Start: 2020-08-01 | End: 2020-08-01

## 2020-07-31 RX ORDER — FAMOTIDINE 20 MG/1
20 TABLET, FILM COATED ORAL 2 TIMES DAILY
Status: DISCONTINUED | OUTPATIENT
Start: 2020-07-31 | End: 2020-08-01

## 2020-07-31 RX ORDER — NIFEDIPINE 30 MG/1
60 TABLET, EXTENDED RELEASE ORAL 2 TIMES DAILY
Status: DISCONTINUED | OUTPATIENT
Start: 2020-07-31 | End: 2020-08-06 | Stop reason: HOSPADM

## 2020-07-31 RX ORDER — HYDRALAZINE HYDROCHLORIDE 20 MG/ML
10 INJECTION INTRAMUSCULAR; INTRAVENOUS ONCE
Status: COMPLETED | OUTPATIENT
Start: 2020-07-31 | End: 2020-07-31

## 2020-07-31 RX ORDER — ACETAMINOPHEN 325 MG/1
650 TABLET ORAL EVERY 6 HOURS PRN
Status: DISCONTINUED | OUTPATIENT
Start: 2020-07-31 | End: 2020-08-06 | Stop reason: HOSPADM

## 2020-07-31 RX ORDER — LABETALOL 100 MG/1
300 TABLET, FILM COATED ORAL EVERY 12 HOURS SCHEDULED
Status: DISCONTINUED | OUTPATIENT
Start: 2020-07-31 | End: 2020-08-06 | Stop reason: HOSPADM

## 2020-07-31 RX ORDER — DOXAZOSIN MESYLATE 1 MG/1
2 TABLET ORAL
Status: DISCONTINUED | OUTPATIENT
Start: 2020-07-31 | End: 2020-08-06 | Stop reason: HOSPADM

## 2020-07-31 RX ORDER — ESCITALOPRAM OXALATE 10 MG/1
10 TABLET ORAL DAILY
Status: DISCONTINUED | OUTPATIENT
Start: 2020-08-01 | End: 2020-08-06 | Stop reason: HOSPADM

## 2020-07-31 RX ORDER — MORPHINE SULFATE 4 MG/ML
4 INJECTION, SOLUTION INTRAMUSCULAR; INTRAVENOUS ONCE
Status: COMPLETED | OUTPATIENT
Start: 2020-07-31 | End: 2020-07-31

## 2020-07-31 RX ORDER — INSULIN GLARGINE 100 [IU]/ML
5 INJECTION, SOLUTION SUBCUTANEOUS
Status: DISCONTINUED | OUTPATIENT
Start: 2020-08-01 | End: 2020-08-06 | Stop reason: HOSPADM

## 2020-07-31 RX ORDER — METOLAZONE 5 MG/1
10 TABLET ORAL DAILY
Status: DISCONTINUED | OUTPATIENT
Start: 2020-08-01 | End: 2020-08-01

## 2020-07-31 RX ORDER — ATORVASTATIN CALCIUM 40 MG/1
40 TABLET, FILM COATED ORAL
Status: DISCONTINUED | OUTPATIENT
Start: 2020-07-31 | End: 2020-08-06 | Stop reason: HOSPADM

## 2020-07-31 RX ORDER — CINACALCET 30 MG/1
60 TABLET, FILM COATED ORAL EVERY OTHER DAY
Status: DISCONTINUED | OUTPATIENT
Start: 2020-08-01 | End: 2020-08-06 | Stop reason: HOSPADM

## 2020-07-31 RX ORDER — HYDRALAZINE HYDROCHLORIDE 25 MG/1
50 TABLET, FILM COATED ORAL 2 TIMES DAILY
Status: DISCONTINUED | OUTPATIENT
Start: 2020-07-31 | End: 2020-08-02

## 2020-07-31 RX ORDER — GABAPENTIN 100 MG/1
100 CAPSULE ORAL
Status: DISCONTINUED | OUTPATIENT
Start: 2020-07-31 | End: 2020-08-06 | Stop reason: HOSPADM

## 2020-07-31 RX ORDER — METOCLOPRAMIDE HYDROCHLORIDE 5 MG/ML
10 INJECTION INTRAMUSCULAR; INTRAVENOUS ONCE
Status: COMPLETED | OUTPATIENT
Start: 2020-07-31 | End: 2020-07-31

## 2020-07-31 RX ORDER — HYDRALAZINE HYDROCHLORIDE 20 MG/ML
10 INJECTION INTRAMUSCULAR; INTRAVENOUS EVERY 6 HOURS PRN
Status: DISCONTINUED | OUTPATIENT
Start: 2020-07-31 | End: 2020-08-03

## 2020-07-31 RX ORDER — HEPARIN SODIUM 5000 [USP'U]/ML
5000 INJECTION, SOLUTION INTRAVENOUS; SUBCUTANEOUS EVERY 8 HOURS SCHEDULED
Status: DISCONTINUED | OUTPATIENT
Start: 2020-07-31 | End: 2020-08-06 | Stop reason: HOSPADM

## 2020-07-31 RX ORDER — ASPIRIN 81 MG/1
81 TABLET, CHEWABLE ORAL DAILY
Status: DISCONTINUED | OUTPATIENT
Start: 2020-08-01 | End: 2020-08-06 | Stop reason: HOSPADM

## 2020-07-31 RX ORDER — DIVALPROEX SODIUM 500 MG/1
500 TABLET, EXTENDED RELEASE ORAL DAILY
Status: DISCONTINUED | OUTPATIENT
Start: 2020-07-31 | End: 2020-08-06 | Stop reason: HOSPADM

## 2020-07-31 RX ORDER — MELATONIN
2000 DAILY
Status: DISCONTINUED | OUTPATIENT
Start: 2020-08-01 | End: 2020-08-06 | Stop reason: HOSPADM

## 2020-07-31 RX ORDER — LISINOPRIL 20 MG/1
40 TABLET ORAL DAILY
Status: DISCONTINUED | OUTPATIENT
Start: 2020-08-01 | End: 2020-08-06 | Stop reason: HOSPADM

## 2020-07-31 RX ADMIN — METRONIDAZOLE 500 MG: 500 INJECTION, SOLUTION INTRAVENOUS at 23:22

## 2020-07-31 RX ADMIN — HYDRALAZINE HYDROCHLORIDE 10 MG: 20 INJECTION INTRAMUSCULAR; INTRAVENOUS at 21:29

## 2020-07-31 RX ADMIN — METOCLOPRAMIDE HYDROCHLORIDE 10 MG: 5 INJECTION INTRAMUSCULAR; INTRAVENOUS at 19:17

## 2020-07-31 RX ADMIN — LABETALOL HYDROCHLORIDE 300 MG: 100 TABLET, FILM COATED ORAL at 23:28

## 2020-07-31 RX ADMIN — HEPARIN SODIUM 5000 UNITS: 5000 INJECTION INTRAVENOUS; SUBCUTANEOUS at 23:31

## 2020-07-31 RX ADMIN — ATORVASTATIN CALCIUM 40 MG: 40 TABLET, FILM COATED ORAL at 23:30

## 2020-07-31 RX ADMIN — NIFEDIPINE 60 MG: 30 TABLET, EXTENDED RELEASE ORAL at 23:26

## 2020-07-31 RX ADMIN — GABAPENTIN 100 MG: 100 CAPSULE ORAL at 23:30

## 2020-07-31 RX ADMIN — DOXAZOSIN 2 MG: 1 TABLET ORAL at 23:30

## 2020-07-31 RX ADMIN — HYDRALAZINE HYDROCHLORIDE 50 MG: 25 TABLET ORAL at 23:31

## 2020-07-31 RX ADMIN — MORPHINE SULFATE 4 MG: 4 INJECTION INTRAVENOUS at 19:10

## 2020-07-31 NOTE — ED PROVIDER NOTES
History  Chief Complaint   Patient presents with    Abdominal Pain     Pt presents to the ED with c/o abdominal pain and diarrhea, reports "This feels the same way as last time when I had an infection from giving myself peritoneal dialysis "     Mr Zaid Black is a 49-year-old male presenting for evaluation of abdominal pain  Patient was here yesterday 7/30 for diarrhea which has not resolved  Patient states that around 2 pm today, he woke up from a nap with abdominal pain about 8/10  Patient said "it reminds me of when I had an infection from giving myself peritoneal dialysis a while ago " Patient is accompanied by his mother at bedside  Mother reports this morning the drainage fluid from the peritoneal catheter was cloudy  Denies fever, vomiting, chest pain, shortness of breath, loss of appetite  Patient is hypertensive on arrival to the ER today 222/93 and slightly tachycardic heart rate 107  Afebrile at 99  Patient is also tremulous and states that this started occurring once neurologist weaned him off divalproex  Unclear as to when this occurred however perchart review last neuro note 7//23/20  Patient has a complicated mass medical history and multiple admissions to this hospital; hx of stroke 2015/2018, CKD - peritoneal dialysis initiated 2018, isolated seizure feb 2020, gastroparesis, neuropathy, retinopathy  Recent admission in June for peritonitis  History provided by:  Parent and patient  Abdominal Pain   Pain location:  Epigastric  Pain quality: aching, pressure and shooting    Pain radiates to:  Does not radiate  Pain severity:  Moderate (7/10)  Onset quality:  Sudden  Duration:  2 hours  Timing:  Constant  Progression:  Worsening  Chronicity:  New  Context: not alcohol use, not diet changes, not eating, not medication withdrawal, not recent illness, not recent travel and not sick contacts    Relieved by:  Nothing  Worsened by:   Movement  Ineffective treatments:  None tried  Associated symptoms: chills, diarrhea, fatigue and nausea    Associated symptoms: no constipation, no cough, no dysuria, no hematuria, no melena, no shortness of breath and no vomiting    Diarrhea:     Quality:  Unable to specify    Number of occurrences:  11 today    Severity:  Severe    Duration:  2 hours (diarrhea present since yesterday )    Timing:  Constant    Progression:  Worsening      Prior to Admission Medications   Prescriptions Last Dose Informant Patient Reported? Taking? AVASTIN 100 MG/4ML   Yes No   B-D ULTRAFINE III SHORT PEN 31G X 8 MM MISC   No No   Sig: USE 1 PEN NEEDLE 8 TIMES DAILY   Cholecalciferol (VITAMIN D) 2000 units CAPS 2020 at Unknown time Mother Yes Yes   Sig: Take 1 capsule by mouth daily   GLUCAGON EMERGENCY 1 MG injection   Yes No   Sig: INJECT 1MG SUBCUTANEOUSLY AS NEEDED (AS DIRECTED)   MAY REPEAT DOSE EVERY 20 MINUTES AS NEEDED   NIFEdipine ER (ADALAT CC) 60 MG 24 hr tablet   No No   Sig: Take 1 tablet (60 mg total) by mouth 2 (two) times a day   Probiotic Product (PROBIOTIC DAILY) CAPS  Self Yes No   Sig: Take 1 capsule by mouth daily at bedtime    Sucroferric Oxyhydroxide (VELPHORO PO)   Yes No   Si mg 3 (three) times a day with meals    aspirin 81 mg chewable tablet 2020 at Unknown time Mother Yes Yes   Sig: Chew 81 mg daily   atorvastatin (LIPITOR) 40 mg tablet 2020 at Unknown time  No Yes   Sig: Take 1 tablet (40 mg total) by mouth every evening   Patient taking differently: Take 40 mg by mouth daily at bedtime    b complex vitamins capsule 2020 at Unknown time Mother Yes Yes   Sig: Take 1 capsule by mouth daily   calcium acetate (PHOSLO) 667 mg capsule 2020 at Unknown time Mother No Yes   Sig: Take 1 capsule (667 mg total) by mouth 3 (three) times a day with meals   cinacalcet (SENSIPAR) 60 MG tablet 2020 at Unknown time  No Yes   Sig: Take 1 tablet (60 mg total) by mouth daily   Patient taking differently: Take 60 mg by mouth every other day cloNIDine (CATAPRES) 0 1 mg tablet  Self Yes Yes   Sig: Take 0 1 mg by mouth as needed May add another 0 1 as needed   divalproex sodium (DEPAKOTE ER) 250 mg 24 hr tablet 7/31/2020 at Unknown time  No Yes   Sig: Take 1 tablet (250 mg total) by mouth daily For 14 days then stop   doxazosin (CARDURA) 2 mg tablet 7/30/2020 at Unknown time Mother No Yes   Sig: Take 1 tablet (2 mg total) by mouth daily at bedtime   Patient taking differently: Take 2 mg by mouth daily    escitalopram (LEXAPRO) 10 mg tablet 7/31/2020 at Unknown time  No Yes   Sig: Take 1 tablet (10 mg total) by mouth daily   famotidine (PEPCID) 20 mg tablet 7/31/2020 at Unknown time  No Yes   Sig: Take 1 tablet (20 mg total) by mouth 2 (two) times a day   gabapentin (NEURONTIN) 100 mg capsule 7/31/2020 at Unknown time  Yes Yes   Sig: Take 100 mg by mouth daily at bedtime   gentamicin (GARAMYCIN) 0 1 % cream   No No   Sig: APPLY TO EXIT SITE ONCE DAILY   hydrALAZINE (APRESOLINE) 100 MG tablet  Self No No   Sig: Take 0 5 tablets (50 mg total) by mouth 3 (three) times a day   Patient taking differently: Take 50 mg by mouth 2 (two) times a day    insulin NPH (HumuLIN N,NovoLIN N) 100 Units/mL subcutaneous injection   Yes No   Sig: Inject 4 Units under the skin daily at bedtime   insulin glargine (LANTUS) 100 units/mL subcutaneous injection   Yes No   Sig: Inject 5 Units under the skin daily after breakfast   insulin lispro (Admelog SoloStar) 100 units/mL injection pen   No No   Sig: Take 4 units with breakfast, 2 units with lunch, and 4 units with dinner   labetalol (NORMODYNE) 300 mg tablet 7/31/2020 at Unknown time  No Yes   Sig: Take 1 tablet (300 mg total) by mouth every 12 (twelve) hours   lisinopril (ZESTRIL) 40 mg tablet 7/31/2020 at Unknown time Mother Yes Yes   Sig: Take 40 mg by mouth daily   metolazone (ZAROXOLYN) 10 mg tablet Past Week at Unknown time  Yes Yes   Sig: Take 10 mg by mouth 3 (three) times a week   ondansetron (ZOFRAN) 4 mg tablet No No   Sig: Take 1 tablet (4 mg total) by mouth every 8 (eight) hours as needed for nausea or vomiting   Patient taking differently: Take 4 mg by mouth every 8 (eight) hours as needed for nausea or vomiting    torsemide (DEMADEX) 100 mg tablet  Mother Yes No   Sig: Take 100 mg by mouth 2 (two) times a day       Facility-Administered Medications: None       Past Medical History:   Diagnosis Date    [facility] [address] )     Anxiety     Cerebellar stroke side undetermined 2015 2015,1/2018    [facility]     IDDM    Diarrhea     Gastroparesis     History of shingles 2010    History of transfusion 02/2018    Hypertension     Muscle weakness     general unsteadiness    Retinopathy        Past Surgical History:   Procedure Laterality Date    CARDIAC LOOP RECORDER  05/2018    EGD      EYE SURGERY      PERITONEAL CATHETER INSERTION N/A 8/27/2018    Procedure: UNROOF PD CATHETER;  Surgeon: Nilesh Qiu DO;  [facility];  Service: General    UT ESOPHAGOGASTRODUODENOSCOPY TRANSORAL DIAGNOSTIC N/A 4/18/2019    Procedure: ESOPHAGOGASTRODUODENOSCOPY (EGD); Surgeon: Patricia eL MD;  Location: [facility]; Service: Gastroenterology    UT LAP INSERTION TUNNELED INTRAPERITONEAL CATHETER N/A 8/6/2018    Procedure: LAPAROSCOPIC PD CATHETER PLACEMENT;  Surgeon: Nilesh Qiu DO;  [facility];  Service: General    TONSILLECTOMY         Family History   Problem Relation Age of Onset   Wash [employer]er Breast cancer Mother     Hypertension Mother     Hyperlipidemia Father     Hypertension Father     Leukemia Maternal Grandmother     Hyperlipidemia Maternal Grandfather     Hypertension Maternal Grandfather     Hyperlipidemia Paternal Grandmother     Hypertension Paternal Grandmother     Heart disease Paternal Grandfather         cardiac disorder    Diabetes Paternal Grandfather      I have reviewed and agree with the history as documented      E-Cigarette/Vaping    E-Cigarette Use Never User      E-Cigarette/Vaping Substances    Nicotine No     THC No     CBD No     Flavoring No     Other No     Unknown No      Social History     Tobacco Use    Smoking status: Former Smoker     Packs/day: 0 50     Years: 12 00     Pack years: 6 00     Types: Cigarettes     Last attempt to quit: 2018     Years since quittin 4    Smokeless tobacco: Former User    Tobacco comment: quit 2018   Substance Use Topics    Alcohol use: Not Currently     Comment: rarely    Drug use: Yes     Frequency: 5 0 times per week     Types: Marijuana     Comment: medical        Review of Systems   Constitutional: Positive for chills and fatigue  HENT: Negative  Eyes: Negative  Respiratory: Negative for cough and shortness of breath  Gastrointestinal: Positive for abdominal pain, diarrhea and nausea  Negative for constipation, melena and vomiting  Endocrine: Negative  Genitourinary: Negative for difficulty urinating, dysuria, flank pain, frequency, hematuria and urgency  Musculoskeletal: Negative  Neurological: Positive for tremors  Physical Exam  ED Triage Vitals   Temperature Pulse Respirations Blood Pressure SpO2   20 1701 20 1701 20 1701 20 1705 20 1701   99 °F (37 2 °C) (!) 107 16 (!) 222/93 94 %      Temp Source Heart Rate Source Patient Position - Orthostatic VS BP Location FiO2 (%)   20 1701 20 1701 20 1806 20 1701 --   Oral Monitor Lying Right arm       Pain Score       20 1701       7             Orthostatic Vital Signs  Vitals:    20 1701 20 1705 20 1806 20   BP:  (!) 222/93 (!) 216/98 (!) 211/96   Pulse: (!) 107  102 100   Patient Position - Orthostatic VS:   Lying Sitting       Physical Exam   Constitutional: He is oriented to person, place, and time  He appears well-developed and well-nourished  Non-toxic appearance  He appears ill  No distress     HENT:   Head: Normocephalic and atraumatic  Eyes: Pupils are equal, round, and reactive to light  No scleral icterus  Cardiovascular: Regular rhythm and intact distal pulses  Exam reveals no friction rub  Murmur (systolic 3/6) heard  Pulmonary/Chest: Effort normal and breath sounds normal  No respiratory distress  He has no wheezes  Abdominal: Soft  Bowel sounds are normal  He exhibits distension  He exhibits no shifting dullness, no abdominal bruit and no mass  There is generalized tenderness and tenderness in the epigastric area  There is no rigidity, no rebound, no guarding, no CVA tenderness, no tenderness at McBurney's point and negative Baum's sign  [place] [facility] is clean and dry , no erythema noted    Neurological: He is alert and oriented to person, place, and time  He is not disoriented  No cranial nerve deficit  GCS eye subscore is 4  GCS verbal subscore is 5  GCS motor subscore is 6  Patient its tremulous    Skin: Skin is warm  There is pallor  Warm, Pale   Small scab like lesions B/L UE/LE   Psychiatric: He has a normal mood and affect  His behavior is normal    Vitals reviewed  ED Medications  Medications   hydrALAZINE (APRESOLINE) injection 10 mg (has no administration in time range)   morphine (PF) 4 mg/mL injection 4 mg (4 mg Intravenous Given 7/31/20 1910)   metoclopramide (REGLAN) injection 10 mg (10 mg Intravenous Given 7/31/20 1917)       Diagnostic Studies  Results Reviewed     Procedure Component Value Units Date/Time    Glucose, body fluid [[id number]] Collected:  07/31/20 2000    Lab Status: In process Specimen: Body Fluid from Dialysate Updated:  07/31/20 2006    Body fluid white cell count with differential [[id number]] Collected:  07/31/20 2000    Lab Status: In process Specimen: Body Fluid from Dialysate Updated:  07/31/20 2006    Body fluid culture and Gram stain [[id number]] Collected:  07/31/20 2000    Lab Status: In process Specimen:   Body Fluid from Peritoneum Updated:  07/31/20 2006    Comprehensive metabolic panel [[id number]]  (Abnormal) Collected:  07/31/20 1901    Lab Status:  Final result Specimen:  Blood from Arm, Right Updated:  07/31/20 1950     Sodium 137 mmol/L      Potassium 5 3 mmol/L      Chloride 99 mmol/L      CO2 31 mmol/L      ANION GAP 7 mmol/L      BUN 36 mg/dL      Creatinine 11 89 mg/dL      Glucose 258 mg/dL      Calcium 8 6 mg/dL      AST 16 U/L      ALT 22 U/L      Alkaline Phosphatase 66 U/L      Total Protein 5 3 g/dL      Albumin 2 6 g/dL      Total Bilirubin 0 42 mg/dL      eGFR 5 ml/min/1 73sq m     Narrative:       National Kidney Disease Foundation guidelines for Chronic Kidney Disease (CKD):     Stage 1 with normal or high GFR (GFR > 90 mL/min/1 73 square meters)    Stage 2 Mild CKD (GFR = 60-89 mL/min/1 73 square meters)    Stage 3A Moderate CKD (GFR = 45-59 mL/min/1 73 square meters)    Stage 3B Moderate CKD (GFR = 30-44 mL/min/1 73 square meters)    Stage 4 Severe CKD (GFR = 15-29 mL/min/1 73 square meters)    Stage 5 End Stage CKD (GFR <15 mL/min/1 73 square meters)  Note: GFR calculation is accurate only with a steady state creatinine    Lactic acid, plasma [[id number]]  (Normal) Collected:  07/31/20 1901    Lab Status:  Final result Specimen:  Blood from Arm, Right Updated:  07/31/20 1942     LACTIC ACID 1 1 mmol/L     Narrative:       Result may be elevated if tourniquet was used during collection  Lipase [[id number]]  (Abnormal) Collected:  07/31/20 1901    Lab Status:  Final result Specimen:  Blood from Arm, Right Updated:  07/31/20 1934     Lipase 48 u/L     Blood culture #1 [[id number]] Collected:  07/31/20 1913    Lab Status: In process Specimen:  Blood from Arm, Left Updated:  07/31/20 1921    Valproic acid level, total [[id number]] Collected:  07/31/20 1913    Lab Status:   In process Specimen:  Blood from Arm, Right Updated:  07/31/20 1920    CBC and differential [[id number]]  (Abnormal) Collected: 07/31/20 1901    Lab Status:  Final result Specimen:  Blood from Arm, Right Updated:  07/31/20 1916     WBC 11 04 Thousand/uL      RBC 2 38 Million/uL      Hemoglobin 7 7 g/dL      Hematocrit 23 9 %       fL      MCH 32 4 pg      MCHC 32 2 g/dL      RDW 15 3 %      MPV 9 8 fL      Platelets 417 Thousands/uL      nRBC 0 /100 WBCs      Neutrophils Relative 83 %      Immat GRANS % 0 %      Lymphocytes Relative 8 %      Monocytes Relative 4 %      Eosinophils Relative 5 %      Basophils Relative 0 %      Neutrophils Absolute 9 08 Thousands/µL      Immature Grans Absolute 0 03 Thousand/uL      Lymphocytes Absolute 0 87 Thousands/µL      Monocytes Absolute 0 48 Thousand/µL      Eosinophils Absolute 0 55 Thousand/µL      Basophils Absolute 0 03 Thousands/µL     Procalcitonin [[id number]] Collected:  07/31/20 1901    Lab Status: In process Specimen:  Blood from Arm, Right Updated:  07/31/20 1908    Blood culture #2 [[id number]] Collected:  07/31/20 1902    Lab Status: In process Specimen:  Blood from Arm, Right Updated:  07/31/20 1907    UA w Reflex to Microscopic w Reflex to Culture [[id number]]     Lab Status:  No result Specimen:  Urine                  CT abdomen pelvis wo contrast   Final Result by Francine Pool MD (07/31 1948)      Wall thickening of the proximal sigmoid colon and distal descending colon could relate to underdistention or colitis  Moderate amount of abdominal and pelvic ascites with left-sided catheter terminating in the medial pelvis  Left lower lobe pulmonary nodules measuring up to 9 mm  This is minimally increased in size compared to the prior exams dating back to 2018  Follow-up recommended in 12 months  Trace pericardial effusion  Small hiatal hernia  Diffuse bladder wall thickening could relate to underdistention or cystitis, correlate with urinalysis           Workstation performed: [id number]               Procedures  Procedures      ED Course  ED Course as of Jul 31 2055 Fri Jul 31, 2020   1737 Rpt bp during exam 387/21      7115 Pt is a dialysis pt and will hold off on 30ml/kg fluid bolus at this time       1827 Concern for peritonitis   Patient evaluated here yesterday for diarrhea, has not improved, cloudy dialysate fluid from peritoneal catheter   Ordered labs, blood cultures, body fluid cultures/studies   Will reassess       1846 Discussed with patient likely admission but will f/u pending labs  Patient reporting unchanged pain, ordered morphine 4 mg IV      1848 Bp remains high; will consider hydralazine but holding off at this moment       1922 CBC and differential(!)   1939 Upon re-evaluation, patient is now nauseous and had vomited  Reglan 10mg ordered  Procal pend, lactate pend      1955 Comprehensive metabolic panel(!)   6999 Body fluid labs pending   For inpatient admission ; spoke with admitting team   Discussed with patients mother and agrees with plan      2055 Hydralazine 10 ordered           US AUDIT      Most Recent Value   Initial Alcohol Screen: US AUDIT-C    1  How often do you have a drink containing alcohol?  0 Filed at: 07/31/2020 2001   2  How many drinks containing alcohol do you have on a typical day you are drinking? 0 Filed at: 07/31/2020 2001   3a  Male UNDER 65: How often do you have five or more drinks on one occasion? 0 Filed at: 07/31/2020 2001   3b  FEMALE Any Age, or MALE 65+: How often do you have 4 or more drinks on one occassion? 0 Filed at: 07/31/2020 2001   Audit-C Score  0 Filed at: 07/31/2020 2001                  ALETHEA/DAST-10      Most Recent Value   How many times in the past year have you    Used an illegal drug or used a prescription medication for non-medical reasons?   Never Filed at: 07/31/2020 2001                              MDM  Number of Diagnoses or Management Options  Colitis: new and requires workup     Amount and/or Complexity of Data Reviewed  Clinical lab tests: ordered and reviewed  Tests in the radiology section of CPT®: ordered and reviewed  Tests in the medicine section of CPT®: ordered and reviewed    Risk of Complications, Morbidity, and/or Mortality  Presenting problems: moderate  Diagnostic procedures: moderate  Management options: moderate    Patient Progress  Patient progress: stable        Disposition  Final diagnoses:   Colitis     Time reflects when diagnosis was documented in both MDM as applicable and the Disposition within this note     Time User Action Codes Description Comment    7/31/2020  8:48 PM Jodi Almonte Orin [K52 9] Colitis       ED Disposition     ED Disposition Condition Date/Time Comment    Admit Stable Fri Jul 31, 2020  8:50 PM Case was discussed with Lb Orlando and the patient's admission status was agreed to be Admission Status: inpatient status to the service of Dr Jez Saeed    Follow-up Information    None         Patient's Medications   Discharge Prescriptions    No medications on file     No discharge procedures on file  PDMP Review       Value Time User    PDMP Reviewed  Yes 4/19/2020 10:30 PM Iris Chong MD           ED Provider  Attending physically available and evaluated Isabelle Handley  I managed the patient along with the ED Attending      Electronically Signed by         Christy Patel MD  07/31/20 2055

## 2020-08-01 PROBLEM — K31.9 TYPE 1 DIABETES MELLITUS WITH DIABETIC GASTROPATHY (HCC): Status: ACTIVE | Noted: 2017-06-19

## 2020-08-01 PROBLEM — K31.89 TYPE 1 DIABETES MELLITUS WITH DIABETIC GASTROPATHY: Status: ACTIVE | Noted: 2017-06-19

## 2020-08-01 PROBLEM — E10.43 TYPE 1 DIABETES MELLITUS WITH DIABETIC GASTROPATHY (HCC): Status: ACTIVE | Noted: 2017-06-19

## 2020-08-01 LAB
ALBUMIN SERPL BCP-MCNC: 2.4 G/DL (ref 3.5–5)
ALP SERPL-CCNC: 65 U/L (ref 46–116)
ALT SERPL W P-5'-P-CCNC: 15 U/L (ref 12–78)
ANION GAP SERPL CALCULATED.3IONS-SCNC: 8 MMOL/L (ref 4–13)
APPEARANCE FLD: NORMAL
AST SERPL W P-5'-P-CCNC: 15 U/L (ref 5–45)
BACTERIA UR QL AUTO: ABNORMAL /HPF
BASOPHILS # BLD AUTO: 0.04 THOUSANDS/ΜL (ref 0–0.1)
BASOPHILS NFR BLD AUTO: 0 % (ref 0–1)
BILIRUB SERPL-MCNC: 0.43 MG/DL (ref 0.2–1)
BILIRUB UR QL STRIP: NEGATIVE
BUN SERPL-MCNC: 37 MG/DL (ref 5–25)
CALCIUM SERPL-MCNC: 9 MG/DL (ref 8.3–10.1)
CHLORIDE SERPL-SCNC: 101 MMOL/L (ref 100–108)
CLARITY UR: CLEAR
CO2 SERPL-SCNC: 28 MMOL/L (ref 21–32)
COLOR FLD: COLORLESS
COLOR UR: YELLOW
CREAT SERPL-MCNC: 11.88 MG/DL (ref 0.6–1.3)
EOSINOPHIL # BLD AUTO: 0.52 THOUSAND/ΜL (ref 0–0.61)
EOSINOPHIL NFR BLD AUTO: 4 % (ref 0–6)
ERYTHROCYTE [DISTWIDTH] IN BLOOD BY AUTOMATED COUNT: 15.3 % (ref 11.6–15.1)
GFR SERPL CREATININE-BSD FRML MDRD: 5 ML/MIN/1.73SQ M
GLUCOSE SERPL-MCNC: 130 MG/DL (ref 65–140)
GLUCOSE SERPL-MCNC: 130 MG/DL (ref 65–140)
GLUCOSE SERPL-MCNC: 208 MG/DL (ref 65–140)
GLUCOSE SERPL-MCNC: 220 MG/DL (ref 65–140)
GLUCOSE SERPL-MCNC: 240 MG/DL (ref 65–140)
GLUCOSE UR STRIP-MCNC: ABNORMAL MG/DL
HCT VFR BLD AUTO: 24.4 % (ref 36.5–49.3)
HGB BLD-MCNC: 7.8 G/DL (ref 12–17)
HGB UR QL STRIP.AUTO: ABNORMAL
IMM GRANULOCYTES # BLD AUTO: 0.06 THOUSAND/UL (ref 0–0.2)
IMM GRANULOCYTES NFR BLD AUTO: 1 % (ref 0–2)
KETONES UR STRIP-MCNC: NEGATIVE MG/DL
LEUKOCYTE ESTERASE UR QL STRIP: NEGATIVE
LYMPHOCYTES # BLD AUTO: 1.01 THOUSANDS/ΜL (ref 0.6–4.47)
LYMPHOCYTES NFR BLD AUTO: 6 %
LYMPHOCYTES NFR BLD AUTO: 8 % (ref 14–44)
MAGNESIUM SERPL-MCNC: 1.9 MG/DL (ref 1.6–2.6)
MCH RBC QN AUTO: 32.2 PG (ref 26.8–34.3)
MCHC RBC AUTO-ENTMCNC: 32 G/DL (ref 31.4–37.4)
MCV RBC AUTO: 101 FL (ref 82–98)
MONOCYTES # BLD AUTO: 0.58 THOUSAND/ΜL (ref 0.17–1.22)
MONOCYTES NFR BLD AUTO: 48 %
MONOCYTES NFR BLD AUTO: 5 % (ref 4–12)
NEUTROPHILS # BLD AUTO: 10.78 THOUSANDS/ΜL (ref 1.85–7.62)
NEUTS SEG NFR BLD AUTO: 46 %
NEUTS SEG NFR BLD AUTO: 82 % (ref 43–75)
NITRITE UR QL STRIP: NEGATIVE
NON-SQ EPI CELLS URNS QL MICRO: ABNORMAL /HPF
NRBC BLD AUTO-RTO: 0 /100 WBCS
PH UR STRIP.AUTO: 8.5 [PH]
PHOSPHATE SERPL-MCNC: 5.9 MG/DL (ref 2.7–4.5)
PLATELET # BLD AUTO: 241 THOUSANDS/UL (ref 149–390)
PLATELET # BLD AUTO: 244 THOUSANDS/UL (ref 149–390)
PMV BLD AUTO: 10 FL (ref 8.9–12.7)
PMV BLD AUTO: 9.8 FL (ref 8.9–12.7)
POTASSIUM SERPL-SCNC: 5.1 MMOL/L (ref 3.5–5.3)
PROCALCITONIN SERPL-MCNC: 0.44 NG/ML
PROT SERPL-MCNC: 5.2 G/DL (ref 6.4–8.2)
PROT UR STRIP-MCNC: ABNORMAL MG/DL
RBC # BLD AUTO: 2.42 MILLION/UL (ref 3.88–5.62)
RBC #/AREA URNS AUTO: ABNORMAL /HPF
SODIUM SERPL-SCNC: 137 MMOL/L (ref 136–145)
SP GR UR STRIP.AUTO: 1.02 (ref 1–1.03)
TOTAL CELLS COUNTED SPEC: 100
UROBILINOGEN UR QL STRIP.AUTO: 0.2 E.U./DL
VANCOMYCIN SERPL-MCNC: 27.8 UG/ML
WBC # BLD AUTO: 12.99 THOUSAND/UL (ref 4.31–10.16)
WBC # FLD MANUAL: 4610 /UL
WBC #/AREA URNS AUTO: ABNORMAL /HPF

## 2020-08-01 PROCEDURE — 87205 SMEAR GRAM STAIN: CPT | Performed by: INTERNAL MEDICINE

## 2020-08-01 PROCEDURE — 99232 SBSQ HOSP IP/OBS MODERATE 35: CPT | Performed by: HOSPITALIST

## 2020-08-01 PROCEDURE — 84145 PROCALCITONIN (PCT): CPT | Performed by: FAMILY MEDICINE

## 2020-08-01 PROCEDURE — 80053 COMPREHEN METABOLIC PANEL: CPT | Performed by: HOSPITALIST

## 2020-08-01 PROCEDURE — 87070 CULTURE OTHR SPECIMN AEROBIC: CPT | Performed by: INTERNAL MEDICINE

## 2020-08-01 PROCEDURE — 80202 ASSAY OF VANCOMYCIN: CPT | Performed by: INTERNAL MEDICINE

## 2020-08-01 PROCEDURE — 83735 ASSAY OF MAGNESIUM: CPT | Performed by: HOSPITALIST

## 2020-08-01 PROCEDURE — 85049 AUTOMATED PLATELET COUNT: CPT | Performed by: PHYSICIAN ASSISTANT

## 2020-08-01 PROCEDURE — 99223 1ST HOSP IP/OBS HIGH 75: CPT | Performed by: INTERNAL MEDICINE

## 2020-08-01 PROCEDURE — 89051 BODY FLUID CELL COUNT: CPT | Performed by: INTERNAL MEDICINE

## 2020-08-01 PROCEDURE — 99222 1ST HOSP IP/OBS MODERATE 55: CPT | Performed by: INTERNAL MEDICINE

## 2020-08-01 PROCEDURE — 82948 REAGENT STRIP/BLOOD GLUCOSE: CPT

## 2020-08-01 PROCEDURE — 81001 URINALYSIS AUTO W/SCOPE: CPT | Performed by: PHYSICIAN ASSISTANT

## 2020-08-01 PROCEDURE — 84100 ASSAY OF PHOSPHORUS: CPT | Performed by: HOSPITALIST

## 2020-08-01 PROCEDURE — 85025 COMPLETE CBC W/AUTO DIFF WBC: CPT | Performed by: HOSPITALIST

## 2020-08-01 RX ORDER — CLONIDINE HYDROCHLORIDE 0.1 MG/1
0.1 TABLET ORAL 2 TIMES DAILY PRN
Status: DISCONTINUED | OUTPATIENT
Start: 2020-08-01 | End: 2020-08-06 | Stop reason: HOSPADM

## 2020-08-01 RX ORDER — FAMOTIDINE 20 MG/1
10 TABLET, FILM COATED ORAL DAILY
Status: DISCONTINUED | OUTPATIENT
Start: 2020-08-01 | End: 2020-08-06 | Stop reason: HOSPADM

## 2020-08-01 RX ORDER — ONDANSETRON 2 MG/ML
4 INJECTION INTRAMUSCULAR; INTRAVENOUS EVERY 6 HOURS PRN
Status: DISCONTINUED | OUTPATIENT
Start: 2020-08-01 | End: 2020-08-03

## 2020-08-01 RX ORDER — CLONIDINE HYDROCHLORIDE 0.1 MG/1
0.1 TABLET ORAL 2 TIMES DAILY PRN
Status: DISCONTINUED | OUTPATIENT
Start: 2020-08-01 | End: 2020-08-01

## 2020-08-01 RX ORDER — GENTAMICIN SULFATE 1 MG/G
OINTMENT TOPICAL DAILY
Status: DISCONTINUED | OUTPATIENT
Start: 2020-08-01 | End: 2020-08-06 | Stop reason: HOSPADM

## 2020-08-01 RX ADMIN — METRONIDAZOLE 500 MG: 500 INJECTION, SOLUTION INTRAVENOUS at 07:22

## 2020-08-01 RX ADMIN — NIFEDIPINE 60 MG: 30 TABLET, EXTENDED RELEASE ORAL at 18:17

## 2020-08-01 RX ADMIN — GABAPENTIN 100 MG: 100 CAPSULE ORAL at 22:44

## 2020-08-01 RX ADMIN — CHOLECALCIFEROL TAB 25 MCG (1000 UNIT) 2000 UNITS: 25 TAB at 08:14

## 2020-08-01 RX ADMIN — ATORVASTATIN CALCIUM 40 MG: 40 TABLET, FILM COATED ORAL at 22:44

## 2020-08-01 RX ADMIN — ONDANSETRON 4 MG: 2 INJECTION INTRAMUSCULAR; INTRAVENOUS at 15:16

## 2020-08-01 RX ADMIN — HYDRALAZINE HYDROCHLORIDE 10 MG: 20 INJECTION INTRAMUSCULAR; INTRAVENOUS at 08:32

## 2020-08-01 RX ADMIN — LISINOPRIL 40 MG: 20 TABLET ORAL at 18:20

## 2020-08-01 RX ADMIN — ASPIRIN 81 MG 81 MG: 81 TABLET ORAL at 08:15

## 2020-08-01 RX ADMIN — HEPARIN SODIUM 5000 UNITS: 5000 INJECTION INTRAVENOUS; SUBCUTANEOUS at 22:44

## 2020-08-01 RX ADMIN — CINACALCET 60 MG: 30 TABLET, FILM COATED ORAL at 08:14

## 2020-08-01 RX ADMIN — INSULIN LISPRO 1 UNITS: 100 INJECTION, SOLUTION INTRAVENOUS; SUBCUTANEOUS at 00:49

## 2020-08-01 RX ADMIN — CEFTRIAXONE SODIUM 1000 MG: 10 INJECTION, POWDER, FOR SOLUTION INTRAVENOUS at 00:44

## 2020-08-01 RX ADMIN — DOXAZOSIN 2 MG: 1 TABLET ORAL at 22:39

## 2020-08-01 RX ADMIN — HEPARIN SODIUM 5000 UNITS: 5000 INJECTION INTRAVENOUS; SUBCUTANEOUS at 16:46

## 2020-08-01 RX ADMIN — HEPARIN SODIUM 5000 UNITS: 5000 INJECTION INTRAVENOUS; SUBCUTANEOUS at 05:17

## 2020-08-01 RX ADMIN — DIVALPROEX SODIUM 500 MG: 500 TABLET, FILM COATED, EXTENDED RELEASE ORAL at 08:15

## 2020-08-01 RX ADMIN — HYDRALAZINE HYDROCHLORIDE 50 MG: 25 TABLET ORAL at 18:17

## 2020-08-01 RX ADMIN — INSULIN HUMAN 5 UNITS: 100 INJECTION, SUSPENSION SUBCUTANEOUS at 00:47

## 2020-08-01 RX ADMIN — NIFEDIPINE 60 MG: 30 TABLET, EXTENDED RELEASE ORAL at 08:16

## 2020-08-01 RX ADMIN — METRONIDAZOLE 500 MG: 500 INJECTION, SOLUTION INTRAVENOUS at 22:44

## 2020-08-01 RX ADMIN — FAMOTIDINE 10 MG: 20 TABLET, FILM COATED ORAL at 08:16

## 2020-08-01 RX ADMIN — TORSEMIDE 100 MG: 100 TABLET ORAL at 08:16

## 2020-08-01 RX ADMIN — INSULIN HUMAN 5 UNITS: 100 INJECTION, SUSPENSION SUBCUTANEOUS at 22:46

## 2020-08-01 RX ADMIN — INSULIN GLARGINE 5 UNITS: 100 INJECTION, SOLUTION SUBCUTANEOUS at 08:32

## 2020-08-01 RX ADMIN — LABETALOL HYDROCHLORIDE 300 MG: 100 TABLET, FILM COATED ORAL at 08:15

## 2020-08-01 RX ADMIN — DIVALPROEX SODIUM 500 MG: 500 TABLET, FILM COATED, EXTENDED RELEASE ORAL at 02:12

## 2020-08-01 RX ADMIN — INSULIN LISPRO 2 UNITS: 100 INJECTION, SOLUTION INTRAVENOUS; SUBCUTANEOUS at 22:45

## 2020-08-01 RX ADMIN — METRONIDAZOLE 500 MG: 500 INJECTION, SOLUTION INTRAVENOUS at 18:17

## 2020-08-01 RX ADMIN — HYDRALAZINE HYDROCHLORIDE 50 MG: 25 TABLET ORAL at 08:15

## 2020-08-01 RX ADMIN — VANCOMYCIN HYDROCHLORIDE 1750 MG: 1 INJECTION, POWDER, LYOPHILIZED, FOR SOLUTION INTRAVENOUS at 01:22

## 2020-08-01 RX ADMIN — LABETALOL HYDROCHLORIDE 300 MG: 100 TABLET, FILM COATED ORAL at 22:40

## 2020-08-01 RX ADMIN — CEFTRIAXONE SODIUM 1000 MG: 10 INJECTION, POWDER, FOR SOLUTION INTRAVENOUS at 23:46

## 2020-08-01 RX ADMIN — ONDANSETRON 4 MG: 2 INJECTION INTRAMUSCULAR; INTRAVENOUS at 22:56

## 2020-08-01 RX ADMIN — ESCITALOPRAM OXALATE 10 MG: 10 TABLET ORAL at 08:14

## 2020-08-01 NOTE — PLAN OF CARE
Problem: Potential for Falls  Goal: Patient will remain free of falls  Description  INTERVENTIONS:  - Assess patient frequently for physical needs  -  Identify cognitive and physical deficits and behaviors that affect risk of falls    -  Dola fall precautions as indicated by assessment   - Educate patient/family on patient safety including physical limitations  - Instruct patient to call for assistance with activity based on assessment  - Modify environment to reduce risk of injury  - Consider OT/PT consult to assist with strengthening/mobility  Outcome: Progressing     Problem: PAIN - ADULT  Goal: Verbalizes/displays adequate comfort level or baseline comfort level  Description  Interventions:  - Encourage patient to monitor pain and request assistance  - Assess pain using appropriate pain scale  - Administer analgesics based on type and severity of pain and evaluate response  - Implement non-pharmacological measures as appropriate and evaluate response  - Consider cultural and social influences on pain and pain management  - Notify physician/advanced practitioner if interventions unsuccessful or patient reports new pain  Outcome: Progressing     Problem: INFECTION - ADULT  Goal: Absence or prevention of progression during hospitalization  Description  INTERVENTIONS:  - Assess and monitor for signs and symptoms of infection  - Monitor lab/diagnostic results  - Monitor all insertion sites, i e  indwelling lines, tubes, and drains  - Monitor endotracheal if appropriate and nasal secretions for changes in amount and color  - Dola appropriate cooling/warming therapies per order  - Administer medications as ordered  - Instruct and encourage patient and family to use good hand hygiene technique  - Identify and instruct in appropriate isolation precautions for identified infection/condition  Outcome: Progressing  Goal: Absence of fever/infection during neutropenic period  Description  INTERVENTIONS:  - Monitor WBC    Outcome: Progressing     Problem: SAFETY ADULT  Goal: Maintain or return to baseline ADL function  Description  INTERVENTIONS:  -  Assess patient's ability to carry out ADLs; assess patient's baseline for ADL function and identify physical deficits which impact ability to perform ADLs (bathing, care of mouth/teeth, toileting, grooming, dressing, etc )  - Assess/evaluate cause of self-care deficits   - Assess range of motion  - Assess patient's mobility; develop plan if impaired  - Assess patient's need for assistive devices and provide as appropriate  - Encourage maximum independence but intervene and supervise when necessary  - Involve family in performance of ADLs  - Assess for home care needs following discharge   - Consider OT consult to assist with ADL evaluation and planning for discharge  - Provide patient education as appropriate  Outcome: Progressing  Goal: Maintain or return mobility status to optimal level  Description  INTERVENTIONS:  - Assess patient's baseline mobility status (ambulation, transfers, stairs, etc )    - Identify cognitive and physical deficits and behaviors that affect mobility  - Identify mobility aids required to assist with transfers and/or ambulation (gait belt, sit-to-stand, lift, walker, cane, etc )  - Kinsey fall precautions as indicated by assessment  - Record patient progress and toleration of activity level on Mobility SBAR; progress patient to next Phase/Stage  - Instruct patient to call for assistance with activity based on assessment  - Consider rehabilitation consult to assist with strengthening/weightbearing, etc   Outcome: Progressing     Problem: DISCHARGE PLANNING  Goal: Discharge to home or other facility with appropriate resources  Description  INTERVENTIONS:  - Identify barriers to discharge w/patient and caregiver  - Arrange for needed discharge resources and transportation as appropriate  - Identify discharge learning needs (meds, wound care, etc )  - Arrange for interpretive services to assist at discharge as needed  - Refer to Case Management Department for coordinating discharge planning if the patient needs post-hospital services based on physician/advanced practitioner order or complex needs related to functional status, cognitive ability, or social support system  Outcome: Progressing     Problem: Knowledge Deficit  Goal: Patient/family/caregiver demonstrates understanding of disease process, treatment plan, medications, and discharge instructions  Description  Complete learning assessment and assess knowledge base    Interventions:  - Provide teaching at level of understanding  - Provide teaching via preferred learning methods  Outcome: Progressing

## 2020-08-01 NOTE — H&P
H&P- Sujata Slade 1985, 37 y o  male MRN: [id number]  Unit/Bed#: ED 29 Encounter: [id number]  Primary Care Provider: Marivel Braxton DO   Date and time admitted to hospital: 7/31/2020  5:07 PM    * Abdominal pain  Assessment & Plan  · Presents with abdominal pain and rigors  Patient has been admitted for peritonitis in the past and was recently discharged dignosed with staph epidermis peritonitis   · CT scan showed Wall thickening of the proximal sigmoid colon and distal descending colon could relate to underdistention or colitis  Moderate amount of abdominal and pelvic ascites with left-sided catheter terminating in the medial pelvis  · Body cultures are still pending at this time  · Star rochepin and flagyl   · Differential diagnosis includes intra-abdominal infection including peritonitis, cholecystitis, appendicitis, peritoneal dialysis catheter infection, IBD, IBS  · Patient was on intraperitoneal vancomycin for a total of 14 days during prior admission  · Follow body cultures   · Consult ID and nephrology   · Trend wbc and fever   · No sepsis at this time   · Follow blood cultures   · Check fecal calprotectin  · C diff negative on 7/30   · stool enteric pathogens  Peritoneal dialysis catheter in place [facility])  Assessment & Plan  · Patient with peritoneal dialysis catheter in place  · Creatinine is ranged from 13-14  · Consult nephrology for dialysis recommendations  Hypertensive urgency  Assessment & Plan  · Patient with hypertensive urgency with blood pressures greater than 200 in the emergency department  · He was given 5 mg of hydralazine in the emergency department  · Will likely have to add additional antihypertensives at this time  · Patient previously has had difficult blood pressure to control given kidney disease  · Continue his home antihypertensives at this time  · Monitor BP      History of lacunar cerebrovascular accident (CVA)  Assessment & Plan  · Continue ASA, and statin    Type 1 diabetes mellitus with hypoglycemia [facility])  Assessment & Plan  Lab Results   Component Value Date    HGBA1C 6 8 (H) 06/21/2020       No results for input(s): POCGLU in the last 72 hours  Blood Sugar Average: Last 72 hrs:     · Patient is type 1 diabetic and has history of inconsistent sugars  · Continue home medications including Lantus 5 units after breakfast   · Continue Humalog 4 units with breakfast, 2 units with lunch, 4 units with dinner  · Continue Novolin 4 units HS  · Sliding scale insulin  · Accu-Cheks q i d  VTE Prophylaxis: Heparin  / sequential compression device   Code Status: full   POLST: There is no POLST form on file for this patient (pre-hospital)  Discussion with family: patient     Anticipated Length of Stay:  Patient will be admitted on an Inpatient basis with an anticipated length of stay of  > 2 midnights  Justification for Hospital Stay: abdominal pain     Total Time for Visit, including Counseling / Coordination of Care: 1 hour  Greater than 50% of this total time spent on direct patient counseling and coordination of care  Chief Complaint:   Abdominal pain    History of Present Illness:    Dax Flores is a 37 y o  male who presents with abdominal pain  He has past medical history of CVA, chronic kidney disease on peritoneal dialysis, hypertension, type 1 diabetes  Patient presents the emergency department for 2 day history of severe abdominal pain which he states was similar to peritonitis he experienced approximately 1-2 months ago  Patient mother states that he had cloudy fluid drawn from his peritoneal dialysis catheter  They do state that he has been having diarrhea for approximately 1 week  He was seen in the emergency department yesterday for this complaint when he was checked for C diff which was negative at that time    Patient was seen previously for peritonitis and was continued on vancomycin intraperitoneal   Currently he denies any fevers  He does state that he is having chills and some rigors  He does states severe abdominal pain  He states that he had 1 episode of emesis but relates this to the morphine he received in the emergency department  Review of Systems:    Review of Systems   Constitutional: Positive for appetite change, chills and fatigue  Negative for fever and unexpected weight change  Respiratory: Negative for cough, chest tightness and shortness of breath  Cardiovascular: Negative for chest pain and palpitations  Gastrointestinal: Positive for abdominal pain, diarrhea, nausea and vomiting  Genitourinary: Negative for decreased urine volume, dysuria, frequency and urgency  Musculoskeletal: Negative for arthralgias  Neurological: Negative for dizziness, syncope, light-headedness and headaches  All other systems reviewed and are negative  Past Medical and Surgical History:     Past Medical History:   Diagnosis Date    Acute kidney injury ([facility] [address] )     Anxiety     Cerebellar stroke side undetermined 2015 2015,1/2018    Diabetes type 1, controlled ([facility] [address] )     IDDM    Diarrhea     Gastroparesis     History of shingles 2010    History of transfusion 02/2018    Hypertension     Muscle weakness     general unsteadiness    Retinopathy        Past Surgical History:   Procedure Laterality Date    CARDIAC LOOP RECORDER  05/2018    EGD      EYE SURGERY      PERITONEAL CATHETER INSERTION N/A 8/27/2018    Procedure: UNROOF PD CATHETER;  Surgeon: Nora Boyle DO;  [facility];  Service: General    NV ESOPHAGOGASTRODUODENOSCOPY TRANSORAL DIAGNOSTIC N/A 4/18/2019    Procedure: ESOPHAGOGASTRODUODENOSCOPY (EGD); Surgeon: Cassi Alcantara MD;  Location: [facility];   Service: Gastroenterology    NV LAP INSERTION TUNNELED INTRAPERITONEAL CATHETER N/A 8/6/2018    Procedure: LAPAROSCOPIC PD CATHETER PLACEMENT;  Surgeon: Nora Boyle DO;  [facility];  Service: General    TONSILLECTOMY Meds/Allergies:    Prior to Admission medications    Medication Sig Start Date End Date Taking?  Authorizing Provider   aspirin 81 mg chewable tablet Chew 81 mg daily   Yes Historical Provider, MD   atorvastatin (LIPITOR) 40 mg tablet Take 1 tablet (40 mg total) by mouth every evening  Patient taking differently: Take 40 mg by mouth daily at bedtime  2/20/20  Yes Janes Becker, DO   b complex vitamins capsule Take 1 capsule by mouth daily   Yes Historical Provider, MD   calcium acetate (PHOSLO) 667 mg capsule Take 1 capsule (667 mg total) by mouth 3 (three) times a day with meals 9/17/18  Yes Pat Shelton PA-C   Cholecalciferol (VITAMIN D) 2000 units CAPS Take 1 capsule by mouth daily   Yes Historical Provider, MD   cinacalcet (SENSIPAR) 60 MG tablet Take 1 tablet (60 mg total) by mouth daily  Patient taking differently: Take 60 mg by mouth every other day  4/21/20  Yes Kimberlee Everett, DO   cloNIDine (CATAPRES) 0 1 mg tablet Take 0 1 mg by mouth as needed May add another 0 1 as needed   Yes Historical Provider, MD   divalproex sodium (DEPAKOTE ER) 250 mg 24 hr tablet Take 1 tablet (250 mg total) by mouth daily For 14 days then stop 7/23/20  Yes Mouna Brown MD   doxazosin (CARDURA) 2 mg tablet Take 1 tablet (2 mg total) by mouth daily at bedtime  Patient taking differently: Take 2 mg by mouth daily  3/3/20  Yes Mateo Vincent MD   escitalopram (LEXAPRO) 10 mg tablet Take 1 tablet (10 mg total) by mouth daily 1/2/20  Yes Janes Becker,    famotidine (PEPCID) 20 mg tablet Take 1 tablet (20 mg total) by mouth 2 (two) times a day 6/16/20  Yes Jono Bee MD   gabapentin (NEURONTIN) 100 mg capsule Take 100 mg by mouth daily at bedtime 11/13/19  Yes Historical Provider, MD   labetalol (NORMODYNE) 300 mg tablet Take 1 tablet (300 mg total) by mouth every 12 (twelve) hours 3/3/20  Yes Rosangela Resendez MD   lisinopril (ZESTRIL) 40 mg tablet Take 40 mg by mouth daily   Yes Historical Provider, MD   metolazone (ZAROXOLYN) 10 mg tablet Take 10 mg by mouth 3 (three) times a week 4/30/20  Yes Historical Provider, MD   AVASTIN 100 MG/4ML  4/30/20   Historical Provider, MD QUEEN ULTRAFINE III SHORT PEN 31G X 8 MM MISC USE 1 PEN NEEDLE 8 TIMES DAILY 9/24/19   Johnathan Tolbert, DO   gentamicin (GARAMYCIN) 0 1 % cream APPLY TO EXIT SITE ONCE DAILY 5/26/20   Gilberto Deshpande MD   GLUCAGON EMERGENCY 1 MG injection INJECT 1MG SUBCUTANEOUSLY AS NEEDED (AS DIRECTED)   MAY REPEAT DOSE EVERY 20 MINUTES AS NEEDED 7/24/19   Historical Provider, MD   hydrALAZINE (APRESOLINE) 100 MG tablet Take 0 5 tablets (50 mg total) by mouth 3 (three) times a day  Patient taking differently: Take 50 mg by mouth 2 (two) times a day  9/17/18   Pat Shelton PA-C   insulin glargine (LANTUS) 100 units/mL subcutaneous injection Inject 5 Units under the skin daily after breakfast    Historical Provider, MD   insulin lispro (Admelog SoloStar) 100 units/mL injection pen Take 4 units with breakfast, 2 units with lunch, and 4 units with dinner 6/23/20   Neri Thompson MD   insulin NPH (HumuLIN N,NovoLIN N) 100 Units/mL subcutaneous injection Inject 4 Units under the skin daily at bedtime    Historical Provider, MD   NIFEdipine ER (ADALAT CC) 60 MG 24 hr tablet Take 1 tablet (60 mg total) by mouth 2 (two) times a day 3/3/20   Mateo Vincent MD   ondansetron (ZOFRAN) 4 mg tablet Take 1 tablet (4 mg total) by mouth every 8 (eight) hours as needed for nausea or vomiting  Patient taking differently: Take 4 mg by mouth every 8 (eight) hours as needed for nausea or vomiting  3/25/19   Johnathan Tolbert, DO   Probiotic Product (PROBIOTIC DAILY) CAPS Take 1 capsule by mouth daily at bedtime     Historical Provider, MD   Sucroferric Oxyhydroxide (VELPHORO PO) 500 mg 3 (three) times a day with meals     Historical Provider, MD   torsemide (DEMADEX) 100 mg tablet Take 100 mg by mouth 2 (two) times a day  8/30/19   Historical Provider, MD VOGEL have reviewed home medications with patient personally  Allergies: Allergies   Allergen Reactions    Sulfa Antibiotics Rash       Social History:     Marital Status: Single   Occupation: [employer]   Patient Pre-hospital Living Situation: lives at home   Patient Pre-hospital Level of Mobility: ambulates   Patient Pre-hospital Diet Restrictions: renal   Substance Use History:   Social History     Substance and Sexual Activity   Alcohol Use Not Currently    Comment: rarely     Social History     Tobacco Use   Smoking Status Former Smoker    Packs/day: 0 50    Years: 12 00    Pack years: 6 00    Types: Cigarettes    Last attempt to quit: 2018    Years since quittin 4   Smokeless Tobacco Former User   Tobacco Comment    quit 2018     Social History     Substance and Sexual Activity   Drug Use Yes    Frequency: 5 0 times per week    Types: Marijuana    Comment: medical       Family History:    Family History   Problem Relation Age of Onset   Teena Lin Breast cancer Mother     Hypertension Mother     Hyperlipidemia Father     Hypertension Father     Leukemia Maternal Grandmother     Hyperlipidemia Maternal Grandfather     Hypertension Maternal Grandfather     Hyperlipidemia Paternal Grandmother     Hypertension Paternal Grandmother     Heart disease Paternal Grandfather         cardiac disorder    Diabetes Paternal Grandfather        Physical Exam:     Vitals:   Blood Pressure: (!) 219/102 (20)  Pulse: 101 (20)  Temperature: 99 °F (37 2 °C) (20)  Temp Source: Oral (20)  Respirations: 20 (20)  Weight - Scale: 106 kg (233 lb 3 2 oz) (20)  SpO2: 98 % (20)    Physical Exam   Constitutional: He is oriented to person, place, and time  He appears well-developed and well-nourished  No distress  Patient appears ill  Him he does have some rigors on exam    HENT:   Head: Normocephalic and atraumatic     Mouth/Throat: Mucous membranes are not pale, not dry and not cyanotic  Eyes: Pupils are equal, round, and reactive to light  Conjunctivae and EOM are normal  Right eye exhibits no discharge  Left eye exhibits no discharge  Neck: Normal range of motion  Neck supple  No JVD present  Cardiovascular: Normal rate, regular rhythm and normal heart sounds  No murmur heard  Pulmonary/Chest: Effort normal and breath sounds normal  He has no wheezes  He has no rales  Abdominal: Soft  Bowel sounds are normal  He exhibits no distension  There is tenderness  There is rebound  Musculoskeletal: Normal range of motion  He exhibits edema  Trace lower extremity edema, Homans sign negative bilaterally  Neurological: He is alert and oriented to person, place, and time  No cranial nerve deficit or sensory deficit  Skin: Skin is warm and dry  Capillary refill takes less than 2 seconds  He is not diaphoretic  No erythema  No pallor  Psychiatric: He has a normal mood and affect  Nursing note and vitals reviewed  Additional Data:     Lab Results: I have personally reviewed pertinent reports  Results from last 7 days   Lab Units 07/31/20  1901   WBC Thousand/uL 11 04*   HEMOGLOBIN g/dL 7 7*   HEMATOCRIT % 23 9*   PLATELETS Thousands/uL 265   NEUTROS PCT % 83*   LYMPHS PCT % 8*   MONOS PCT % 4   EOS PCT % 5     Results from last 7 days   Lab Units 07/31/20  1901   SODIUM mmol/L 137   POTASSIUM mmol/L 5 3   CHLORIDE mmol/L 99*   CO2 mmol/L 31   BUN mg/dL 36*   CREATININE mg/dL 11 89*   ANION GAP mmol/L 7   CALCIUM mg/dL 8 6   ALBUMIN g/dL 2 6*   TOTAL BILIRUBIN mg/dL 0 42   ALK PHOS U/L 66   ALT U/L 22   AST U/L 16   GLUCOSE RANDOM mg/dL 258*                 Results from last 7 days   Lab Units 07/31/20  1901 07/30/20  1454   LACTIC ACID mmol/L 1 1 1 0       Imaging: I have personally reviewed pertinent reports        CT abdomen pelvis wo contrast   Final Result by Bubba Recinos MD (07/31 1948)      Wall thickening of the proximal sigmoid colon and distal descending colon could relate to underdistention or colitis  Moderate amount of abdominal and pelvic ascites with left-sided catheter terminating in the medial pelvis  Left lower lobe pulmonary nodules measuring up to 9 mm  This is minimally increased in size compared to the prior exams dating back to 2018  Follow-up recommended in 12 months  Trace pericardial effusion  Small hiatal hernia  Diffuse bladder wall thickening could relate to underdistention or cystitis, correlate with urinalysis  Workstation performed: [id number]             EKG, Pathology, and Other Studies Reviewed on Admission:   · all reports viewed in [employer] / [id number] Records Reviewed: Yes     ** Please Note: This note has been constructed using a voice recognition system   **

## 2020-08-01 NOTE — ASSESSMENT & PLAN NOTE
· Patient with hypertensive urgency with BP max of 222/93 in the emergency department  · Current /89  · Currently managed with Doxazosin 2mg, Hydralazine 50mg BID, Labetalol 300mg Q12H, Lisinopril 40mg daily, Nifedipine 60mg BID and Torsemide 100mg BID  · Clonidine will be added PRN  · Nephrology suggested Hydralazine TID if BP continues to be elevated after lunch  · Continue to Monitor BP

## 2020-08-01 NOTE — ASSESSMENT & PLAN NOTE
· Patient with hypertensive urgency with blood pressures greater than 200 in the emergency department  · He was given 5 mg of hydralazine in the emergency department  · Will likely have to add additional antihypertensives at this time  · Patient previously has had difficult blood pressure to control given kidney disease  · Continue his home antihypertensives at this time  · Monitor BP

## 2020-08-01 NOTE — ED NOTES
MS2 charge RN requesting patient remain in ED approx 30mins to allow staffing issues on floor to be resolved        Joaquín Torres RN  07/31/20 [id number]

## 2020-08-01 NOTE — TREATMENT PLAN
Reviewed with Primary team AP and Nursing team    Will start PD manual exchanges q 6 hours 1 5% dwell  AM cell count of PD fluid and culture    Currently appears to have peritonitis  Start vanco also in addition to antibiotics per Primary team    vanc level in AM    Await culture data given recent peritonitis would be concerned if pt needs catheter exchange or removal  Will need to review with ID team once culture data returned

## 2020-08-01 NOTE — ASSESSMENT & PLAN NOTE
· Patient with peritoneal dialysis catheter in place  · Creatinine is ranged from 13-14  · Consult nephrology for dialysis recommendations

## 2020-08-01 NOTE — ASSESSMENT & PLAN NOTE
Lab Results   Component Value Date    HGBA1C 6 8 (H) 06/21/2020       Recent Labs     07/31/20  2319 08/01/20  0747   POCGLU 183* 130       Blood Sugar Average: Last 72 hrs:  (P) 156 5   · 30 year hx of type 1 diabetes with history of inconsistent sugars  Diagnosed at 1years old  · 1 dose of metoclopramide was given yesterday (07/31/2020)  · Continue home medications including Lantus 5 units after breakfast   · Continue Humalog 4 units with breakfast, 2 units with lunch, 4 units with dinner  · Continue Novolin 4 units HS  · Sliding scale insulin  · Accu-Cheks q i d  Ricardo Phelan

## 2020-08-01 NOTE — ASSESSMENT & PLAN NOTE
· Patient with peritoneal dialysis catheter in place  · Creatinine baseline from 12-14   Currently at 11 88  · Nephrology onboard

## 2020-08-01 NOTE — CONSULTS
[address] Burketjennifer Mckeon 37 y o  male MRN: [id number]  Unit/Bed#: S -01 Encounter: [id number]    ASSESSMENT and PLAN:    37 y  o  male  ESRD on PD, Type I DM, HTN, HPL, CVA, seizure homozygous for C677T MTHFR mutation and heterozygote for prothombin gene mutation who was admitted to [facility] after presenting with abd pain  Pt with recent admission for peritonitis in June for stephylococcus epidermidis  Pt was discharged to complete 2 weeks IP vancomycin  Catheter salvage was attempted  PD catheter related infection  Seen in ER on 7/30 for diarrhea       1) ESRD on PD     - outpatient unit [facility]  - outpatient prescription - 4 cycles of 2 2 L and 1 fill at 1 5L with last fill 1 2L for total 10 hours overnight and daily exchange at 4 pm?  -  5  - weight initially 106 kg on 7/31       Plan:  - gent ointment to PD catheter site daily  - cont q 6 hour manual exchange 1 5% dwells today; may need to change to q 4 hour  - monitor for fibrin  - antibiotics on board  - daily vanc level  - await final cultures  - await PD culture data  - daily cell count for now   - BMP in AM  - trend phos  - titrate hydralazine to TID if BP remains elevated at lunch time  - hold diuretics  To note  Pt is not on metolazone at home  On torsemide    Update - reviewed with ID team  Await final cultures  If cultures with no growth or same bacteria as prior in PD fluid, then will need catheter removal  Ok to continue catheter until cultures return and plan finalized        2) HTN     - prior was on hydralazine 50 mg TID, minoxidil 5 mg HS, doxazosin 4 mg BID, clonidine 0 1 mg TID, labetalol 300 mg BID, torsemide 100 mg BID, nifedipine 60 mg BID, lisinopril 40 mg daily  - most recent med list with clonidine 0 1 mg TID, doxazosin 4 mg BID, hydralazine 50 mg TID, labetalol 300 mg BID, lisinopril 40 mg daily, nifedipine 60 mg BID, torsemide 100 mg daily  - currently on doxazosin 2 mg daily, hydralazine 50 mg BID, labetalol 300 mg TID, metolazone 10 mg daily, nifedipine 60 mg BID, torsemide 100 mg BID, lisinopril 40  - clonidine can be added prn 0 1 if needed  - increase hydralazine to tid if bp remains elevated at lunch time  - holding diuretics for now due to possible vol depletion       3) electrolytes     - hyperk improving     4) abd pain - peritonitis vs gut translocation leading to peritonitis     - abd pain  - concern for peritoneal source vs GI source  - CT scan with thickening of prox sigmoid colon  - PD fluid with elevated WBC count  - await PD culture  - UA 4-10 WBC  - daily PD fluid cell count  - may need PD catheter removal vs exchange depending on culture data and ID team recs  This episode may be related to GI/translocation though based on pt symptoms, history  - vanc level 27 8  - antibiotics started overnight     5) MBD     - was on phos binder, sensipar as outpatient     6) acid/base - bicarb stable  Lactic acid nl yest     7) anemia     -monitor  - need to obtain outpatient records Monday as pt does receive epogen as outpatient    HISTORY OF PRESENT ILLNESS:  Requesting Physician: La Trejo MD  Reason for Consult: ESRD on PD    Odell Diego is a 37 y o  male ESRD on PD, Type I DM, HTN, HPL, CVA, seizure homozygous for C677T MTHFR mutation and heterozygote for prothombin gene mutation who was admitted to [facility] after presenting with abd pain on 7/31  A renal consultation is requested today for assistance in the management of ESRD  Pt had abd pain for 2 days  Pt had cloudy dialysate fluid  Also with diarrhea for one week   Also vomiting one time    PAST MEDICAL HISTORY:  Past Medical History:   Diagnosis Date    Acute kidney injury ([address] )     Anxiety     Cerebellar stroke side undetermined 2015 2015,1/2018    Diabetes type 1, controlled ([address] )     IDDM    Diarrhea     Gastroparesis     History of shingles 2010    History of transfusion 02/2018    Hypertension     Muscle weakness     general unsteadiness    Retinopathy        PAST SURGICAL HISTORY:  Past Surgical History:   Procedure Laterality Date    CARDIAC LOOP RECORDER  2018    EGD      EYE SURGERY      PERITONEAL CATHETER INSERTION N/A 2018    Procedure: UNROOF PD CATHETER;  Surgeon: Anish Garcia DO;  [facility];  Service: General    AR ESOPHAGOGASTRODUODENOSCOPY TRANSORAL DIAGNOSTIC N/A 2019    Procedure: ESOPHAGOGASTRODUODENOSCOPY (EGD); Surgeon: Yves Nguyen MD;  Location[facility];   Service: Gastroenterology    AR LAP INSERTION TUNNELED INTRAPERITONEAL CATHETER N/A 2018    Procedure: LAPAROSCOPIC PD CATHETER PLACEMENT;  Surgeon: Anish Garcia DO;  [facility];  Service: General    TONSILLECTOMY         ALLERGIES:  Allergies   Allergen Reactions    Sulfa Antibiotics Rash       SOCIAL HISTORY:  Social History     Substance and Sexual Activity   Alcohol Use Not Currently    Comment: rarely     Social History     Substance and Sexual Activity   Drug Use Yes    Frequency: 5 0 times per week    Types: Marijuana    Comment: medical     Social History     Tobacco Use   Smoking Status Former Smoker    Packs/day: 0 50    Years: 12 00    Pack years: 6 00    Types: Cigarettes    Last attempt to quit: 2018    Years since quittin 4   Smokeless Tobacco Former User   Tobacco Comment    quit 2018       FAMILY HISTORY:  Family History   Problem Relation Age of Onset    Breast cancer Mother     Hypertension Mother     Hyperlipidemia Father     Hypertension Father     Leukemia Maternal Grandmother     Hyperlipidemia Maternal Grandfather     Hypertension Maternal Grandfather     Hyperlipidemia Paternal Grandmother     Hypertension Paternal Grandmother     Heart disease Paternal Grandfather         cardiac disorder    Diabetes Paternal Grandfather        MEDICATIONS:    Current Facility-Administered Medications:     acetaminophen (TYLENOL) tablet 650 mg, 650 mg, Oral, Q6H PRN, Sudarshan Womack PA-C    aspirin chewable tablet 81 mg, 81 mg, Oral, Daily, Sudarshan Womack PA-C    atorvastatin (LIPITOR) tablet 40 mg, 40 mg, Oral, HS, Sudarshan Womack PA-C, 40 mg at 07/31/20 2330    calcium acetate (PHOSLO) capsule 667 mg, 667 mg, Oral, TID With Meals, Ekaterina Ohara PA-C    cefTRIAXone (ROCEPHIN) 1,000 mg in dextrose 5 % 50 mL IVPB, 1,000 mg, Intravenous, Q24H, Sudarshan Womack PA-C, Last Rate: 100 mL/hr at 08/01/20 0044, 1,000 mg at 08/01/20 0044    cholecalciferol (VITAMIN D3) tablet 2,000 Units, 2,000 Units, Oral, Daily, Sudarshan Womack PA-C    cinacalcet (SENSIPAR) tablet 60 mg, 60 mg, Oral, Every Other Day, Sudarshan Womack PA-C    divalproex sodium (DEPAKOTE ER) 24 hr tablet 500 mg, 500 mg, Oral, Daily, Sudarshan Womack PA-C, 500 mg at 08/01/20 0212    doxazosin (CARDURA) tablet 2 mg, 2 mg, Oral, HS, Sudarshan Womack PA-C, 2 mg at 07/31/20 2330    escitalopram (LEXAPRO) tablet 10 mg, 10 mg, Oral, Daily, Sudarshan Womack PA-C    famotidine (PEPCID) tablet 10 mg, 10 mg, Oral, Daily, Sudarshan Womack PA-C    gabapentin (NEURONTIN) capsule 100 mg, 100 mg, Oral, HS, Sudarshan Womack PA-C, 100 mg at 07/31/20 2330    heparin (porcine) subcutaneous injection 5,000 Units, 5,000 Units, Subcutaneous, Q8H [address], 5,000 Units at 08/01/20 0517 **AND** [COMPLETED] Platelet count, , , Once, Sudarshan Womack PA-C    hydrALAZINE (APRESOLINE) injection 10 mg, 10 mg, Intravenous, Q6H PRN, Ekaterina Ohara PA-C    hydrALAZINE (APRESOLINE) tablet 50 mg, 50 mg, Oral, BID, Sudarshan Womack PA-C, 50 mg at 07/31/20 2331    insulin glargine (LANTUS) subcutaneous injection 5 Units 0 05 mL, 5 Units, Subcutaneous, After Breakfast, Sudarshan Womack PA-C    insulin lispro (HumaLOG) 100 units/mL subcutaneous injection 1-5 Units, 1-5 Units, Subcutaneous, HS, Sudarshan Womack PA-C, 1 Units at 08/01/20 0049    insulin lispro (HumaLOG) 100 units/mL subcutaneous injection 1-6 Units, 1-6 Units, Subcutaneous, TID AC **AND** Fingerstick Glucose (POCT), , , TID AC, Sudarshan Womack PA-C    insulin lispro (HumaLOG) 100 units/mL subcutaneous injection 2 Units, 2 Units, Subcutaneous, Daily With Lunch, Sudarshan Womack PA-C    insulin lispro (HumaLOG) 100 units/mL subcutaneous injection 4 Units, 4 Units, Subcutaneous, Daily With Breakfast, Sudarshan Womack PA-C    insulin lispro (HumaLOG) 100 units/mL subcutaneous injection 4 Units, 4 Units, Subcutaneous, Daily With Dinner, Sudarshan Womack PA-C    insulin NPH (HumuLIN N,NovoLIN N) 100 Units/mL subcutaneous injection 5 Units, 5 Units, Subcutaneous, HS, Sudarshan Womack PA-C, 5 Units at 08/01/20 0047    labetalol (NORMODYNE) tablet 300 mg, 300 mg, Oral, Q12H HALIE, Sudarshan Womack PA-C, 300 mg at 07/31/20 2328    lisinopril (ZESTRIL) tablet 40 mg, 40 mg, Oral, Daily, Sudarshan Womack PA-C    metolazone (ZAROXOLYN) tablet 10 mg, 10 mg, Oral, Daily, Sudarshan Womack PA-C    metroNIDAZOLE (FLAGYL) IVPB (premix) 500 mg 100 mL, 500 mg, Intravenous, Q8H, Sudarshan Womack PA-C, Last Rate: 200 mL/hr at 08/01/20 4375, 500 mg at 08/01/20 0618    NIFEdipine (PROCARDIA XL) 24 hr tablet 60 mg, 60 mg, Oral, BID, Sudarshan Womack PA-C, 60 mg at 07/31/20 2326    torsemide (DEMADEX) tablet 100 mg, 100 mg, Oral, BID (diuretic), Sudarshan Womack PA-C    [START ON 8/2/2020] vancomycin (VANCOCIN) 1,250 mg in sodium chloride 0 9 % 250 mL IVPB, 15 mg/kg (Adjusted), Intravenous, Once PRN, Sudarshan Womack PA-C    REVIEW OF SYSTEMS:    All the systems were reviewed and were negative except as documented on the HPI      PHYSICAL EXAM:  Current Weight: Weight - Scale: 106 kg (233 lb 3 2 oz)  First Weight: Weight - Scale: 106 kg (233 lb 3 2 oz)  Vitals:    07/31/20 2313 08/01/20 0230 08/01/20 0700 08/01/20 0701   BP: (!) 180/80 (!) 180/84 (!) 201/87 (!) 185/89   BP Location: Right arm Left arm Left arm Left arm   Pulse: 104 102 99    Resp: 20 20 18    Temp: (!) 100 9 °F (38 3 °C) (!) 100 7 °F (38 2 °C) 99 5 °F (37 5 °C)    TempSrc: Oral Axillary Oral    SpO2: 96% 97% 98%    Weight:       Height: 5' 10" (1 778 m)          Intake/Output Summary (Last 24 hours) at 8/1/2020 0755  Last data filed at 8/1/2020 0630  Gross per 24 hour   Intake    Output -700 ml   Net 700 ml     Physical Exam     General: NAD  Skin: no rash  Eyes: anicteric sclera  ENT: moist mucous membrane  Neck: supple  Chest: CTA b/l, no ronchii, no wheeze, no rubs, no rales  CVS: s1s2, no murmur, no gallop, no rub  Abdomen: soft, nontender, nl sounds, PD catheter site covered     Extremities: no sig edema LE b/l  : no preston  Neuro: AAOX3  Psych: normal affect      Invasive Devices:      Lab Results:   Results from last 7 days   Lab Units 08/01/20  0308 07/31/20  1901 07/30/20  1454   WBC Thousand/uL 12 99* 11 04* 6 68   HEMOGLOBIN g/dL 7 8* 7 7* 8 3*   HEMATOCRIT % 24 4* 23 9* 25 5*   PLATELETS Thousands/uL 244  241 265 320   POTASSIUM mmol/L 5 1 5 3 5 6*   CHLORIDE mmol/L 101 99* 98*   CO2 mmol/L 28 31 31   BUN mg/dL 37* 36* 37*   CREATININE mg/dL 11 88* 11 89* 12 41*   CALCIUM mg/dL 9 0 8 6 9 2   MAGNESIUM mg/dL 1 9  --   --    PHOSPHORUS mg/dL 5 9*  --   --    ALK PHOS U/L 65 66 68   ALT U/L 15 22 26   AST U/L 15 16 17

## 2020-08-01 NOTE — ASSESSMENT & PLAN NOTE
· Presents with abdominal pain and rigors  Patient has been admitted for peritonitis in the past and was recently discharged dignosed with staph epidermis peritonitis   · CT scan showed Wall thickening of the proximal sigmoid colon and distal descending colon could relate to underdistention or colitis  Moderate amount of abdominal and pelvic ascites with left-sided catheter terminating in the medial pelvis  · Body cultures are still pending at this time  · Star rochepin and flagyl   · Differential diagnosis includes intra-abdominal infection including peritonitis, cholecystitis, appendicitis, peritoneal dialysis catheter infection, IBD, IBS  · Patient was on intraperitoneal vancomycin for a total of 14 days during prior admission  · Follow body cultures   · Consult ID and nephrology   · Trend wbc and fever   · No sepsis at this time   · Follow blood cultures   · Check fecal calprotectin  · C diff negative on 7/30   · stool enteric pathogens

## 2020-08-01 NOTE — ASSESSMENT & PLAN NOTE
· Presents with abdominal pain and rigors  Patient has been admitted for peritonitis in the past and was recently discharged dignosed with staph epidermis peritonitis and sent with a course of intraperitoneal Vancomycin for 14 days  · CT scan showed Wall thickening of the proximal sigmoid colon and distal descending colon could relate to underdistention or colitis  Moderate amount of abdominal and pelvic ascites with left-sided catheter terminating in the medial pelvis  C diff negative on 7/30   · Body cultures preliminary results from 1 culture showed no bacteria  · Follow body cultures   · Consulted ID and nephrology   · Follow WBC and VS  · Follow fecal calprotectin  · Awaiting results for stool enteric pathogens

## 2020-08-01 NOTE — CONSULTS
Consultation - Infectious Disease   Primitivo Chelsie Mckeon 37 y o  male MRN: [id number]  Unit/Bed#: S -01 Encounter: [id number]      IMPRESSION & RECOMMENDATIONS:     38-year-old male patient with diabetes mellitus type 1, on peritoneal dialysis for the past 2 years, and recent episode of Staph epidermidis peritonitis treated conservatively with 14 days of intraperitoneal vancomycin, admitted for abdominal pain and fever    1- sepsis, POA:  Fever, tachycardia, mild leucocytosis  Suspect sepsis secondary to peritonitis in setting of abdominal pain and recent history of peritonitis  Concern for bacteremia  CT scan showing possible colitis, though I doubt secondary peritenonitis from intraabdominal infectious focus  C diff tox pcr negative  - continue intravenous antibiotics with vancomycin, ceftriaxone and flagyl  - pharmacy follow up for vancomycin dosing  - follow up final result of fluid culture and blood culture and adjust antibiotics accordingly    2- peritonitis associated with peritoneal dialysis catheter:  First episode of peritonitis diagnosed 06/2020, at that time culture was positive for Staph epidermidis  Peritoneal dialysis catheter was kept in place, and patient received 14 days of intraperitoneal vancomycin through 07/01/2020  - concern that current episode of peritonitis represents a relapsing infection  - continue vancomycin, ceftriaxone and Flagyl for now  - if fluid culture remains negative, then will consider current episode secondary to relapsing infection, and continue vancomycin IV, and stop ceftriaxone and Flagyl; also in this case would recommend removal of peritoneal dialysis catheter    This was discussed at length with nephrologist  - follow-up final blood culture results    3- diabetes mellitus type 1:  This is likely the cause of end-stage renal disease  - management as per primary service    4- end-stage renal disease:  Patient has been on peritoneal dialysis for the past 2 years  He reports being on transplant list, workup done at [facility]  - continue outpatient follow-up with Nephrology    Plan mentioned above discussed in details with patient and his father present at bedside    I Have discussed the above management plan in detail with nephrologist    Extensive review of the medical records in epic including review of the notes, radiographs, and laboratory results     HISTORY OF PRESENT ILLNESS:  Reason for Consult:  Peritonitis  HPI: Devi Robles is a 37y o  year old male with diabetes mellitus type 1, history of CVA, end-stage renal disease, on peritoneal dialysis for the past 2 years, recent history of Staph epidermidis peritonitis, admitted for abdominal pain and  fever  Patient reports that abdominal pain, fever, loose stool started 1 day prior to presentation  Upon admission, patient with low-grade fever T-max 100 9°, hypertensive urgency with blood pressure more than 200/90 mmhg  A CT scan abdomen pelvis was done showing wall thickening of proximal sigmoid colon, and moderate amount of abdominal and pelvic ascites  Sampling of peritoneal fluid was done showing more than 7000 WBC, 71% PMN  Patient was started empirically on vancomycin, ceftriaxone, Flagyl for peritonitis associated with peritoneal dialysis catheter  Review of chart, shows the patient was treated for Staph epidermidis peritonitis 06/2020; he received 14 days of intraperitoneal vancomycin through 07/01/2020  At that time peritoneal dialysis catheter was retained  No other reported history of peritonitis  REVIEW OF SYSTEMS:  A complete review of systems is negative other than that noted in the HPI      PAST MEDICAL HISTORY:  Past Medical History:   Diagnosis Date    Acute [address] [address]     Anxiety     Cerebellar stroke side undetermined 2015 2015,1/2018    Diabetes type 1, controlled ([facility] [address] )     IDDM    Diarrhea     Gastroparesis     History of shingles 2010    History of transfusion 2018    Hypertension     Muscle weakness     general unsteadiness    Retinopathy      Past Surgical History:   Procedure Laterality Date    CARDIAC LOOP RECORDER  2018    EGD      EYE SURGERY      PERITONEAL CATHETER INSERTION N/A 2018    Procedure: UNROOF PD CATHETER;  Surgeon: Agustina Priest DO;  [facility];  Service: General    NV ESOPHAGOGASTRODUODENOSCOPY TRANSORAL DIAGNOSTIC N/A 2019    Procedure: ESOPHAGOGASTRODUODENOSCOPY (EGD); Surgeon: Sheyla Paige MD;  [facility]; Service: Gastroenterology    NV LAP INSERTION TUNNELED INTRAPERITONEAL CATHETER N/A 2018    Procedure: LAPAROSCOPIC PD CATHETER PLACEMENT;  Surgeon: Agustina Priest DO;  [facility];  Service: General    TONSILLECTOMY         FAMILY HISTORY:  Non-contributory    SOCIAL HISTORY:  Social History   Social History     Substance and Sexual Activity   Alcohol Use Not Currently    Comment: rarely     Social History     Substance and Sexual Activity   Drug Use Yes    Frequency: 5 0 times per week    Types: Marijuana    Comment: medical     Social History     Tobacco Use   Smoking Status Former Smoker    Packs/day: 0 50    Years: 12 00    Pack years: 6 00    Types: Cigarettes    Last attempt to quit: 2018    Years since quittin 4   Smokeless Tobacco Former User   Tobacco Comment    quit 2018       ALLERGIES:  Allergies   Allergen Reactions    Sulfa Antibiotics Rash       MEDICATIONS:  All current active medications have been reviewed        PHYSICAL EXAM:  Temp:  [99 °F (37 2 °C)-100 9 °F (38 3 °C)] 99 5 °F (37 5 °C)  HR:  [] 99  Resp:  [16-21] 18  BP: (165-222)/() 165/79  SpO2:  [94 %-98 %] 98 %  Temp (24hrs), Av °F (37 8 °C), Min:99 °F (37 2 °C), Max:100 9 °F (38 3 °C)  Current: Temperature: 99 5 °F (37 5 °C)    Intake/Output Summary (Last 24 hours) at 2020 1350  Last data filed at 2020 0950  Gross per 24 hour   Intake  Output -700 ml   Net 700 ml       General Appearance:  Appearing well, nontoxic, and in no distress   Head:  Normocephalic, without obvious abnormality, atraumatic   Eyes:  Conjunctiva pink and sclera anicteric, both eyes   Nose: Nares normal, mucosa normal, no drainage   Throat: Oropharynx moist without lesions   Neck: Supple, symmetrical, no adenopathy, no tenderness/mass/nodules   Back:   Symmetric, no curvature, ROM normal, no CVA tenderness   Lungs:   Clear to auscultation bilaterally, respirations unlabored   Chest Wall:  No tenderness or deformity   Heart:  RRR; no murmur, rub or gallop   Abdomen:   Soft, diffuse tenderness upon palpation, positive bowel sounds, no guarding or rigidity, left lower quadrant peritoneal dialysis catheter in place, no surrounding purulence   Extremities: No cyanosis, clubbing or edema   Skin: No rashes or lesions  No draining wounds noted  Lymph nodes: Cervical, supraclavicular nodes normal   Neurologic: Alert and oriented times 3, extremity strength 5/5 and symmetric       LABS, IMAGING, & OTHER STUDIES:  Lab Results:  I have personally reviewed pertinent labs  Results from last 7 days   Lab Units 08/01/20 0308 07/31/20  1901 07/30/20  1454   WBC Thousand/uL 12 99* 11 04* 6 68   HEMOGLOBIN g/dL 7 8* 7 7* 8 3*   PLATELETS Thousands/uL 244  241 265 320     Results from last 7 days   Lab Units 08/01/20 0308 07/31/20  1901 07/30/20  1454   SODIUM mmol/L 137 137 137   POTASSIUM mmol/L 5 1 5 3 5 6*   CHLORIDE mmol/L 101 99* 98*   CO2 mmol/L 28 31 31   BUN mg/dL 37* 36* 37*   CREATININE mg/dL 11 88* 11 89* 12 41*   EGFR ml/min/1 73sq m 5 5 5   CALCIUM mg/dL 9 0 8 6 9 2   AST U/L 15 16 17   ALT U/L 15 22 26   ALK PHOS U/L 65 66 68     Results from last 7 days   Lab Units 07/31/20 2000 07/31/20  1913 07/31/20  1902 07/30/20  1647 07/30/20  1501 07/30/20  1454   BLOOD CULTURE   --  Received in Microbiology Lab  Culture in Progress  Received in Microbiology Lab   Culture in Progress  --  No Growth at 24 hrs  No Growth at 24 hrs  GRAM STAIN RESULT  3+ Polys  No bacteria seen  --   --   --   --   --    BODY FLUID CULTURE, STERILE  No growth  --   --   --   --   --    C DIFF TOXIN B   --   --   --  Negative  --   --      Results from last 7 days   Lab Units 08/01/20  0308 07/31/20  1901   PROCALCITONIN ng/ml 0 44* 0 27*                   Imaging Studies:   I have personally reviewed pertinent imaging study reports and images in PACS  CT abdomen and pelvis showing wall thickening of proximal sigmoid and distal descending colon might be related understands shin or to colitis  Moderate amount of abdominal and pelvic ascites, and left-sided catheter terminating in medial pelvis    Other Studies:   I have personally reviewed pertinent reports    C diff toxin PCR 07/30/2020 is negative

## 2020-08-01 NOTE — PROGRESS NOTES
Progress Note Lina Level 1985, 37 y o  male MRN: [id number]    Unit/Bed#: S -01 Encounter: [id number]    Primary Care Provider: Eboni Olivera DO   Date and time admitted to hospital: 7/31/2020  5:07 PM        Peritoneal dialysis catheter in place [facility])  Assessment & Plan  · Patient with peritoneal dialysis catheter in place  · Creatinine baseline from 12-14  Currently at 11 88  · Nephrology onboard      History of lacunar cerebrovascular accident (CVA)  Assessment & Plan  · Continue ASA, and statin  · Continue BP meds with the addition of Clonidine    Type 1 diabetes mellitus with diabetic gastropathy [facility])  Assessment & Plan  Lab Results   Component Value Date    HGBA1C 6 8 (H) 06/21/2020       Recent Labs     07/31/20  2319 08/01/20  0747   POCGLU 183* 130       Blood Sugar Average: Last 72 hrs:  (P) 156 5   · 30 year hx of type 1 diabetes with history of inconsistent sugars  Diagnosed at 1years old  · 1 dose of metoclopramide was given yesterday (07/31/2020)  · Continue home medications including Lantus 5 units after breakfast   · Continue Humalog 4 units with breakfast, 2 units with lunch, 4 units with dinner  · Continue Novolin 4 units HS  · Sliding scale insulin  · Accu-Cheks q i d  Hypertensive urgency  Assessment & Plan  · Patient with hypertensive urgency with BP max of 222/93 in the emergency department  · Current /89  · Currently managed with Doxazosin 2mg, Hydralazine 50mg BID, Labetalol 300mg Q12H, Lisinopril 40mg daily, Nifedipine 60mg BID and Torsemide 100mg BID  · Clonidine will be added PRN  · Nephrology suggested Hydralazine TID if BP continues to be elevated after lunch  · Continue to Monitor BP  * Abdominal pain  Assessment & Plan  · Presents with abdominal pain and rigors   Patient has been admitted for peritonitis in the past and was recently discharged dignosed with staph epidermis peritonitis and sent with a course of intraperitoneal Vancomycin for 14 days  · CT scan showed Wall thickening of the proximal sigmoid colon and distal descending colon could relate to underdistention or colitis  Moderate amount of abdominal and pelvic ascites with left-sided catheter terminating in the medial pelvis  C diff negative on    · Body cultures preliminary results from 1 culture showed no bacteria  · Follow body cultures   · Consulted ID and nephrology   · Follow WBC and VS  · Follow fecal calprotectin  · Awaiting results for stool enteric pathogens  VTE Pharmacologic Prophylaxis:   Pharmacologic: Heparin  Mechanical VTE Prophylaxis in Place: Yes    Discussions with Specialists or Other Care Team Provider: Internal Medicine, Nephrology, ID, Nurse staff  Education and Discussions with Family / Patient: Discussed with patient and mother at bedside    Current Length of Stay: 1 day(s)    Current Patient Status: Inpatient     Discharge Plan / Estimated Discharge Date: >48hrs    Code Status: Level 1 - Full Code      Subjective:   Patient was asleep and snoring but he was easily aroused  He did not complain of any abd pain, chills, SOB, palpitations or dizziness  He had an episode of vomiting this morning when taking his mornings PO meds  Objective:     Vitals:   Temp (24hrs), Av °F (37 8 °C), Min:99 °F (37 2 °C), Max:100 9 °F (38 3 °C)    Temp:  [99 °F (37 2 °C)-100 9 °F (38 3 °C)] 99 5 °F (37 5 °C)  HR:  [] 99  Resp:  [16-21] 18  BP: (165-222)/() 165/79  SpO2:  [94 %-98 %] 98 %  Body mass index is 33 46 kg/m²  Input and Output Summary (last 24 hours): Intake/Output Summary (Last 24 hours) at 2020 1104  Last data filed at 2020 0950  Gross per 24 hour   Intake    Output -700 ml   Net 700 ml       Physical Exam:     Physical Exam   Constitutional: He is oriented to person, place, and time  He appears well-developed and well-nourished  He is sleeping and cooperative  He is easily aroused  He appears ill     HENT:   Head: Normocephalic and atraumatic  Eyes: Conjunctivae and lids are normal    Neck: Normal range of motion and full passive range of motion without pain  Neck supple  No JVD present  No neck rigidity  Normal range of motion present  Cardiovascular: Normal rate, regular rhythm, S1 normal, S2 normal and normal heart sounds  Exam reveals no decreased pulses  Pulmonary/Chest: Effort normal and breath sounds normal  No accessory muscle usage  No tachypnea  No respiratory distress  He has no decreased breath sounds  He has no wheezes  He has no rhonchi  He has no rales  Abdominal: Normal appearance and bowel sounds are normal  He exhibits no distension  There is no tenderness  Neurological: He is alert, oriented to person, place, and time and easily aroused  GCS eye subscore is 4  GCS verbal subscore is 5  GCS motor subscore is 6  Skin: Skin is warm and dry  No rash noted  Psychiatric: He has a normal mood and affect  His speech is normal and behavior is normal          Additional Data:     Labs:    Results from last 7 days   Lab Units 08/01/20  0308   WBC Thousand/uL 12 99*   HEMOGLOBIN g/dL 7 8*   HEMATOCRIT % 24 4*   PLATELETS Thousands/uL 244  241   NEUTROS PCT % 82*   LYMPHS PCT % 8*   MONOS PCT % 5   EOS PCT % 4     Results from last 7 days   Lab Units 08/01/20  0308   POTASSIUM mmol/L 5 1   CHLORIDE mmol/L 101   CO2 mmol/L 28   BUN mg/dL 37*   CREATININE mg/dL 11 88*   CALCIUM mg/dL 9 0   ALK PHOS U/L 65   ALT U/L 15   AST U/L 15           * I Have Reviewed All Lab Data Listed Above  * Additional Pertinent Lab Tests Reviewed: Ellen 64 Admission Reviewed    Imaging:    Imaging Reports Reviewed Today Include: None  Imaging Personally Reviewed by Myself Includes:  None    Recent Cultures (last 7 days):     Results from last 7 days   Lab Units 07/31/20 2000 07/31/20  1913 07/31/20  1902 07/30/20  1647 07/30/20  1501 07/30/20  1454   BLOOD CULTURE   --  Received in Microbiology Lab  Culture in Progress  Received in Microbiology Lab  Culture in Progress  --  No Growth at 24 hrs  No Growth at 24 hrs     GRAM STAIN RESULT  3+ Polys  No bacteria seen  --   --   --   --   --    C DIFF TOXIN B   --   --   --  Negative  --   --        Last 24 Hours Medication List:     Current Facility-Administered Medications:  acetaminophen 650 mg Oral Q6H PRN Sudarshan Womack PA-C    aspirin 81 mg Oral Daily Suadrshan Womack PA-C    atorvastatin 40 mg Oral HS Sudarshan Womack PA-C    calcium acetate 667 mg Oral TID With Meals Sudarshan Womack PA-C    cefTRIAXone 1,000 mg Intravenous Q24H Sudarshan Womack PA-C Last Rate: 1,000 mg (08/01/20 0044)   cholecalciferol 2,000 Units Oral Daily Sudarshan Womack PA-C    cinacalcet 60 mg Oral Every Other Day Sudarshan Womack PA-C    cloNIDine 0 1 mg Oral BID PRN [place] Trejo MD    divalproex sodium 500 mg Oral Daily Sudarshan Womack PA-C    doxazosin 2 mg Oral HS Sudarshan Womack PA-C    escitalopram 10 mg Oral Daily Sudarshan Womack PA-C    famotidine 10 mg Oral Daily Sudarshan Womack PA-C    gabapentin 100 mg Oral HS Sudarshan Womack PA-C    gentamicin  Topical Daily Lon Almeida MD    heparin (porcine) 5,000 Units Subcutaneous [facility] Sudarshan Womack PA-C    hydrALAZINE 10 mg Intravenous Q6H PRN Sudarshan Womack PA-C    hydrALAZINE 50 mg Oral BID Sudarshan Womack PA-C    insulin glargine 5 Units Subcutaneous After Breakfast Sudarshan Womack PA-C    insulin lispro 1-5 Units Subcutaneous HS Sudarshan Womack PA-C    insulin lispro 1-6 Units Subcutaneous TID AC Sudarshan Womack PA-C    insulin lispro 2 Units Subcutaneous Daily With Lunch Sudarshan Womack PA-C    insulin lispro 4 Units Subcutaneous Daily With Breakfast Sudarshan Womack PA-C    insulin lispro 4 Units Subcutaneous Daily With Dinner Sudarshan Womack PA-C    insulin NPH 5 Units Subcutaneous HS Sudarshan Womack PA-C    labetalol 300 mg Oral Q12H [address] Sudarshan Womack PA-C    lisinopril 40 mg Oral Daily Sudarshan Womack PA-C metroNIDAZOLE 500 mg Intravenous Q8H Sudarshan Womack PA-C Last Rate: 500 mg (08/01/20 0722)   NIFEdipine ER 60 mg Oral BID Sudarshan Womack PA-C    ondansetron 4 mg Intravenous Q6H PRN Santo Bamberger, MD Loanne Hemp ON 8/2/2020] vancomycin 15 mg/kg (Adjusted) Intravenous Once PRN Ave Bender PA-C         Today, Patient Was Seen By: Stewart Robledo MD    ** Please Note: This note has been constructed using a voice recognition system   **

## 2020-08-01 NOTE — ASSESSMENT & PLAN NOTE
Lab Results   Component Value Date    HGBA1C 6 8 (H) 06/21/2020       No results for input(s): POCGLU in the last 72 hours  Blood Sugar Average: Last 72 hrs:     · Patient is type 1 diabetic and has history of inconsistent sugars  · Continue home medications including Lantus 5 units after breakfast   · Continue Humalog 4 units with breakfast, 2 units with lunch, 4 units with dinner  · Continue Novolin 4 units HS  · Sliding scale insulin  · Accu-Cheks q i d  [place]

## 2020-08-01 NOTE — PROGRESS NOTES
Vancomycin Assessment    Zita Dennis is a 37 y o  male who is currently receiving vancomycin 1750mg iv load followed with 1250mg (15mg/kg) when daily random vanco level <20 for other peritonitis   Relevant clinical data and objective history reviewed:  Creatinine   Date Value Ref Range Status   08/01/2020 11 88 (H) 0 60 - 1 30 mg/dL Final     Comment:     Standardized to IDMS reference method   07/31/2020 11 89 (H) 0 60 - 1 30 mg/dL Final     Comment:     Standardized to IDMS reference method   07/30/2020 12 41 (H) 0 60 - 1 30 mg/dL Final     Comment:     Standardized to IDMS reference method   11/03/2015 1 90 (H) 0 60 - 1 30 mg/dL Final     Comment:     Standardized to IDMS reference method   10/28/2015 2 37 (H) 0 60 - 1 30 mg/dL Final     Comment:     Standardized to IDMS reference method   10/27/2015 2 44 (H) 0 60 - 1 30 mg/dL Final     Comment:     Standardized to IDMS reference method     Vancomycin Rm   Date Value Ref Range Status   06/23/2020 24 4 ug/mL Final     BP (!) 180/84 (BP Location: Left arm)   Pulse 102   Temp (!) 100 7 °F (38 2 °C) (Axillary)   Resp 20   Ht 5' 10" (1 778 m)   Wt 106 kg (233 lb 3 2 oz)   SpO2 97%   BMI 33 46 kg/m²   No intake/output data recorded    Lab Results   Component Value Date/Time    BUN 37 (H) 08/01/2020 03:08 AM    BUN 34 (H) 11/03/2015 11:32 AM    WBC 12 99 (H) 08/01/2020 03:08 AM    WBC 8 54 10/28/2015 05:43 PM    HGB 7 8 (L) 08/01/2020 03:08 AM    HGB 11 6 (L) 10/28/2015 05:43 PM    HCT 24 4 (L) 08/01/2020 03:08 AM    HCT 32 1 (L) 10/28/2015 05:43 PM     (H) 08/01/2020 03:08 AM    MCV 83 10/28/2015 05:43 PM     08/01/2020 03:08 AM     08/01/2020 03:08 AM     10/28/2015 05:43 PM     Temp Readings from Last 3 Encounters:   08/01/20 (!) 100 7 °F (38 2 °C) (Axillary)   07/30/20 (!) 100 7 °F (38 2 °C) (Oral)   07/01/20 98 5 °F (36 9 °C)     Vancomycin Days of Therapy: 1    Assessment/Plan  The patient is currently on vancomycin utilizing pulse dosing based on adjusted body weight (due to obesity)  Baseline risks associated with therapy include: pre-existing renal impairment and concomitant nephrotoxic medications  The patient is currently receiving 1750mg iv load followed with 1250mg (15mg/kg) when daily random vanco level <20 and is clinically appropriate and dose will be continued  Pharmacy will also follow closely for s/sx of nephrotoxicity, infusion reactions and appropriateness of therapy  BMP and CBC will be ordered per protocol  Plan for daily random trough starting 8/2 at 0600  Due to infection severity, will target a trough of 15-20 (appropriate for most indications)   Pharmacy will continue to follow the patients culture results and clinical progress daily      Vira Alpers, Pharmacist

## 2020-08-02 PROBLEM — K65.9 PERITONITIS (HCC): Status: ACTIVE | Noted: 2020-07-31

## 2020-08-02 PROBLEM — J36 PERITONSILLITIS: Status: ACTIVE | Noted: 2020-07-31

## 2020-08-02 LAB
ABO GROUP BLD: NORMAL
ANION GAP SERPL CALCULATED.3IONS-SCNC: 9 MMOL/L (ref 4–13)
APPEARANCE FLD: CLEAR
BLD GP AB SCN SERPL QL: NEGATIVE
BUN SERPL-MCNC: 41 MG/DL (ref 5–25)
CALCIUM SERPL-MCNC: 8.6 MG/DL (ref 8.3–10.1)
CHLORIDE SERPL-SCNC: 101 MMOL/L (ref 100–108)
CO2 SERPL-SCNC: 28 MMOL/L (ref 21–32)
COLOR FLD: YELLOW
CREAT SERPL-MCNC: 12.62 MG/DL (ref 0.6–1.3)
EOSINOPHIL NFR FLD MANUAL: 4 %
ERYTHROCYTE [DISTWIDTH] IN BLOOD BY AUTOMATED COUNT: 15 % (ref 11.6–15.1)
GFR SERPL CREATININE-BSD FRML MDRD: 4 ML/MIN/1.73SQ M
GLUCOSE SERPL-MCNC: 171 MG/DL (ref 65–140)
GLUCOSE SERPL-MCNC: 205 MG/DL (ref 65–140)
GLUCOSE SERPL-MCNC: 233 MG/DL (ref 65–140)
GLUCOSE SERPL-MCNC: 278 MG/DL (ref 65–140)
GLUCOSE SERPL-MCNC: 290 MG/DL (ref 65–140)
HCT VFR BLD AUTO: 22.2 % (ref 36.5–49.3)
HGB BLD-MCNC: 7.3 G/DL (ref 12–17)
LYMPHOCYTES NFR BLD AUTO: 19 %
MCH RBC QN AUTO: 32.4 PG (ref 26.8–34.3)
MCHC RBC AUTO-ENTMCNC: 32.9 G/DL (ref 31.4–37.4)
MCV RBC AUTO: 99 FL (ref 82–98)
MONOCYTES NFR BLD AUTO: 71 %
MRSA NOSE QL CULT: NORMAL
NEUTS SEG NFR BLD AUTO: 6 %
PHOSPHATE SERPL-MCNC: 7.3 MG/DL (ref 2.7–4.5)
PLATELET # BLD AUTO: 225 THOUSANDS/UL (ref 149–390)
PMV BLD AUTO: 10.3 FL (ref 8.9–12.7)
POTASSIUM SERPL-SCNC: 5.1 MMOL/L (ref 3.5–5.3)
RBC # BLD AUTO: 2.25 MILLION/UL (ref 3.88–5.62)
RH BLD: POSITIVE
SITE: NORMAL
SODIUM SERPL-SCNC: 138 MMOL/L (ref 136–145)
SPECIMEN EXPIRATION DATE: NORMAL
TOTAL CELLS COUNTED SPEC: 100
VANCOMYCIN SERPL-MCNC: 19.2 UG/ML
WBC # BLD AUTO: 6.15 THOUSAND/UL (ref 4.31–10.16)
WBC # FLD MANUAL: 527 /UL

## 2020-08-02 PROCEDURE — 80048 BASIC METABOLIC PNL TOTAL CA: CPT | Performed by: INTERNAL MEDICINE

## 2020-08-02 PROCEDURE — 80202 ASSAY OF VANCOMYCIN: CPT | Performed by: HOSPITALIST

## 2020-08-02 PROCEDURE — 99232 SBSQ HOSP IP/OBS MODERATE 35: CPT | Performed by: HOSPITALIST

## 2020-08-02 PROCEDURE — 86901 BLOOD TYPING SEROLOGIC RH(D): CPT | Performed by: PSYCHIATRY & NEUROLOGY

## 2020-08-02 PROCEDURE — 86900 BLOOD TYPING SEROLOGIC ABO: CPT | Performed by: PSYCHIATRY & NEUROLOGY

## 2020-08-02 PROCEDURE — 82948 REAGENT STRIP/BLOOD GLUCOSE: CPT

## 2020-08-02 PROCEDURE — 99233 SBSQ HOSP IP/OBS HIGH 50: CPT | Performed by: INTERNAL MEDICINE

## 2020-08-02 PROCEDURE — 84100 ASSAY OF PHOSPHORUS: CPT | Performed by: INTERNAL MEDICINE

## 2020-08-02 PROCEDURE — 89051 BODY FLUID CELL COUNT: CPT | Performed by: INTERNAL MEDICINE

## 2020-08-02 PROCEDURE — 86850 RBC ANTIBODY SCREEN: CPT | Performed by: PSYCHIATRY & NEUROLOGY

## 2020-08-02 PROCEDURE — 99232 SBSQ HOSP IP/OBS MODERATE 35: CPT | Performed by: INTERNAL MEDICINE

## 2020-08-02 PROCEDURE — 85027 COMPLETE CBC AUTOMATED: CPT | Performed by: INTERNAL MEDICINE

## 2020-08-02 RX ORDER — HYDRALAZINE HYDROCHLORIDE 25 MG/1
50 TABLET, FILM COATED ORAL EVERY 8 HOURS SCHEDULED
Status: DISCONTINUED | OUTPATIENT
Start: 2020-08-02 | End: 2020-08-06 | Stop reason: HOSPADM

## 2020-08-02 RX ORDER — HYDRALAZINE HYDROCHLORIDE 25 MG/1
50 TABLET, FILM COATED ORAL EVERY 8 HOURS SCHEDULED
Status: DISCONTINUED | OUTPATIENT
Start: 2020-08-02 | End: 2020-08-02

## 2020-08-02 RX ADMIN — HYDRALAZINE HYDROCHLORIDE 10 MG: 20 INJECTION INTRAMUSCULAR; INTRAVENOUS at 16:10

## 2020-08-02 RX ADMIN — VANCOMYCIN HYDROCHLORIDE 1250 MG: 1 INJECTION, POWDER, LYOPHILIZED, FOR SOLUTION INTRAVENOUS at 10:50

## 2020-08-02 RX ADMIN — HEPARIN SODIUM 5000 UNITS: 5000 INJECTION INTRAVENOUS; SUBCUTANEOUS at 05:55

## 2020-08-02 RX ADMIN — INSULIN HUMAN 5 UNITS: 100 INJECTION, SUSPENSION SUBCUTANEOUS at 21:41

## 2020-08-02 RX ADMIN — ONDANSETRON 4 MG: 2 INJECTION INTRAMUSCULAR; INTRAVENOUS at 08:28

## 2020-08-02 RX ADMIN — LABETALOL HYDROCHLORIDE 300 MG: 100 TABLET, FILM COATED ORAL at 21:43

## 2020-08-02 RX ADMIN — GABAPENTIN 100 MG: 100 CAPSULE ORAL at 21:48

## 2020-08-02 RX ADMIN — METRONIDAZOLE 500 MG: 500 INJECTION, SOLUTION INTRAVENOUS at 06:45

## 2020-08-02 RX ADMIN — ACETAMINOPHEN 650 MG: 325 TABLET, FILM COATED ORAL at 18:28

## 2020-08-02 RX ADMIN — ONDANSETRON 4 MG: 2 INJECTION INTRAMUSCULAR; INTRAVENOUS at 21:35

## 2020-08-02 RX ADMIN — INSULIN LISPRO 2 UNITS: 100 INJECTION, SOLUTION INTRAVENOUS; SUBCUTANEOUS at 21:40

## 2020-08-02 RX ADMIN — HEPARIN SODIUM: 1000 INJECTION INTRAVENOUS; SUBCUTANEOUS at 23:40

## 2020-08-02 RX ADMIN — HYDRALAZINE HYDROCHLORIDE 10 MG: 20 INJECTION INTRAMUSCULAR; INTRAVENOUS at 08:25

## 2020-08-02 RX ADMIN — ATORVASTATIN CALCIUM 40 MG: 40 TABLET, FILM COATED ORAL at 21:48

## 2020-08-02 RX ADMIN — DOXAZOSIN 2 MG: 1 TABLET ORAL at 21:46

## 2020-08-02 RX ADMIN — INSULIN GLARGINE 5 UNITS: 100 INJECTION, SOLUTION SUBCUTANEOUS at 08:27

## 2020-08-02 RX ADMIN — HYDRALAZINE HYDROCHLORIDE 50 MG: 25 TABLET ORAL at 21:44

## 2020-08-02 RX ADMIN — HEPARIN SODIUM 5000 UNITS: 5000 INJECTION INTRAVENOUS; SUBCUTANEOUS at 21:37

## 2020-08-02 NOTE — PLAN OF CARE
Problem: Potential for Falls  Goal: Patient will remain free of falls  Description: INTERVENTIONS:  - Assess patient frequently for physical needs  -  Identify cognitive and physical deficits and behaviors that affect risk of falls    -  [place] fall precautions as indicated by assessment   - Educate patient/family on patient safety including physical limitations  - Instruct patient to call for assistance with activity based on assessment  - Modify environment to reduce risk of injury  - Consider OT/PT consult to assist with strengthening/mobility  Outcome: Progressing     Problem: PAIN - ADULT  Goal: Verbalizes/displays adequate comfort level or baseline comfort level  Description: Interventions:  - Encourage patient to monitor pain and request assistance  - Assess pain using appropriate pain scale  - Administer analgesics based on type and severity of pain and evaluate response  - Implement non-pharmacological measures as appropriate and evaluate response  - Consider cultural and social influences on pain and pain management  - Notify physician/advanced practitioner if interventions unsuccessful or patient reports new pain  Outcome: Progressing     Problem: INFECTION - ADULT  Goal: Absence or prevention of progression during hospitalization  Description: INTERVENTIONS:  - Assess and monitor for signs and symptoms of infection  - Monitor lab/diagnostic results  - Monitor all insertion sites, i e  indwelling lines, tubes, and drains  - Monitor endotracheal if appropriate and nasal secretions for changes in amount and color  - [place] appropriate cooling/warming therapies per order  - Administer medications as ordered  - Instruct and encourage patient and family to use good hand hygiene technique  - Identify and instruct in appropriate isolation precautions for identified infection/condition  Outcome: Progressing  Goal: Absence of fever/infection during neutropenic period  Description: INTERVENTIONS:  - Monitor WBC    Outcome: Progressing     Problem: SAFETY ADULT  Goal: Maintain or return to baseline ADL function  Description: INTERVENTIONS:  -  Assess patient's ability to carry out ADLs; assess patient's baseline for ADL function and identify physical deficits which impact ability to perform ADLs (bathing, care of mouth/teeth, toileting, grooming, dressing, etc )  - Assess/evaluate cause of self-care deficits   - Assess range of motion  - Assess patient's mobility; develop plan if impaired  - Assess patient's need for assistive devices and provide as appropriate  - Encourage maximum independence but intervene and supervise when necessary  - Involve family in performance of ADLs  - Assess for home care needs following discharge   - Consider OT consult to assist with ADL evaluation and planning for discharge  - Provide patient education as appropriate  Outcome: Progressing  Goal: Maintain or return mobility status to optimal level  Description: INTERVENTIONS:  - Assess patient's baseline mobility status (ambulation, transfers, stairs, etc )    - Identify cognitive and physical deficits and behaviors that affect mobility  - Identify mobility aids required to assist with transfers and/or ambulation (gait belt, sit-to-stand, lift, walker, cane, etc )  - [place] fall precautions as indicated by assessment  - Record patient progress and toleration of activity level on Mobility SBAR; progress patient to next Phase/Stage  - Instruct patient to call for assistance with activity based on assessment  - Consider rehabilitation consult to assist with strengthening/weightbearing, etc   Outcome: Progressing     Problem: DISCHARGE PLANNING  Goal: Discharge to home or other facility with appropriate resources  Description: INTERVENTIONS:  - Identify barriers to discharge w/patient and caregiver  - Arrange for needed discharge resources and transportation as appropriate  - Identify discharge learning needs (meds, wound care, etc )  - Arrange for interpretive services to assist at discharge as needed  - Refer to Case Management Department for coordinating discharge planning if the patient needs post-hospital services based on physician/advanced practitioner order or complex needs related to functional status, cognitive ability, or social support system  Outcome: Progressing     Problem: Knowledge Deficit  Goal: Patient/family/caregiver demonstrates understanding of disease process, treatment plan, medications, and discharge instructions  Description: Complete learning assessment and assess knowledge base    Interventions:  - Provide teaching at level of understanding  - Provide teaching via preferred learning methods  Outcome: Progressing

## 2020-08-02 NOTE — PROGRESS NOTES
Progress Note Juana Saldaña 1985, 37 y o  male MRN: [id number]    Unit/Bed#: S -01 Encounter: [id number]    Primary Care Provider: Katt Red DO   Date and time admitted to hospital: 7/31/2020  5:07 PM        Peritoneal dialysis catheter in place [facility])  Assessment & Plan  · Patient with peritoneal dialysis catheter in place  · Creatinine baseline from 12-14  Currently at 12 62  · Nephrology onboard  · May need to be removed given that this is likely a relapse of peritonitis 1-2 months ago      Anemia  Assessment & Plan  Patient has chronic anemia  H/H on labs this morning was 7 3/22 2  Consider transfusing if hemoglobin less than 7, however given that the patient is on the transplant list at [facility], the transfusion of blood could prevent him from getting transplanted  Discuss with patient and family before and transfuse patient if critically needed, and consider repeating Hgb/Hct before administartion  Consent obtained    History of lacunar cerebrovascular accident (CVA)  Assessment & Plan  · Continue ASA, and statin  · Continue BP meds with the addition of Clonidine    Type 1 diabetes mellitus with diabetic gastropathy [facility])  Assessment & Plan  Lab Results   Component Value Date    HGBA1C 6 8 (H) 06/21/2020       Recent Labs     08/01/20  0747 08/01/20  1110 08/01/20  1653 08/01/20  2108   POCGLU 130 208* 220* 240*       Blood Sugar Average: Last 72 hrs:  (P) 196 2   · 30 year hx of type 1 diabetes with history of inconsistent sugars  Diagnosed at 1years old  · 1 dose of metoclopramide was given 07/31/2020  · Continue home medications including Lantus 5 units after breakfast   · Continue Humalog 4 units with breakfast, 2 units with lunch, 4 units with dinner  · Continue Novolin 4 units HS  · Sliding scale insulin  · Accu-Cheks q i d  Hypertensive urgency  Assessment & Plan  · Patient with hypertensive urgency with BP max of 222/93 in the emergency department    · Current /80, after hydralazine 184/98  · Currently managed with Doxazosin 2mg, Hydralazine 50mg BID, Labetalol 300mg Q12H, Lisinopril 40mg daily, Nifedipine 60mg BID and Torsemide 100mg BID  · Pt refused AM BP meds due to nausea, IV hydralazine given, nurse will reassess may consider other IV medications if still elevated   · Clonidine added PRN  · Hydralazine increased to TID  · Continue to Monitor BP  · Pt patient is chronically hypertensive    Vitals:    08/01/20 2239 08/01/20 2300 08/02/20 0700 08/02/20 0924   BP: (!) 182/60 (!) 188/79 (!) 208/80 (!) 184/98   BP Location:  Left arm Right arm    Pulse: 97 100 89    Resp:  20 18    Temp:  98 7 °F (37 1 °C) 98 4 °F (36 9 °C)    TempSrc:  Oral Oral    SpO2:  100% 94%    Weight:       Height:             * Peritonitis (HCC)  Assessment & Plan  · Presents with abdominal pain and rigors  Patient has been admitted for peritonitis in the past and was recently discharged dignosed with staph epidermis peritonitis and sent with a course of intraperitoneal Vancomycin for 14 days  · CT scan showed Wall thickening of the proximal sigmoid colon and distal descending colon could relate to underdistention or colitis  Moderate amount of abdominal and pelvic ascites with left-sided catheter terminating in the medial pelvis  C diff negative on 7/30   · Body cultures preliminary results from 1 culture showed no bacteria  · Follow body cultures   · Consulted ID and nephrology   · Follow WBC and VS - no leukocytosis noted on labs today  · Follow fecal calprotectin  · Awaiting results for stool enteric pathogens    · Patient may require removal of peritoneal catheter, surgery has been consulted  · NPO at midnight for possible procedure      VTE Pharmacologic Prophylaxis:   Pharmacologic: Heparin  Mechanical VTE Prophylaxis in Place: No    Discussions with Specialists or Other Care Team Provider:  Infectious Disease, Nephrology    Education and Discussions with Family / Patient:  Blood transfusion, space consent obtained    Current Length of Stay: 2 day(s)    Current Patient Status: Inpatient     Discharge Plan / Estimated Discharge Date: Possibly Monday or Tuesday, continue inpatient treatment for now    Code Status: Level 1 - Full Code      Subjective:   Patient seen and examined  No acute events overnight  Patient had a blood pressure of 208/80 this morning and had refused his p o  hypertensive medications to nausea and having had vomited them yesterday  IV hydralazine was given by RN and blood pressure was subsequently the rechecked 184/98  Note patient is chronically hypertensive  Objective:     Vitals:   Temp (24hrs), Av 3 °F (36 8 °C), Min:97 9 °F (36 6 °C), Max:98 7 °F (37 1 °C)    Temp:  [97 9 °F (36 6 °C)-98 7 °F (37 1 °C)] 98 4 °F (36 9 °C)  HR:  [] 89  Resp:  [18-20] 18  BP: (182-208)/(60-98) 184/98  SpO2:  [94 %-100 %] 94 %  Body mass index is 33 46 kg/m²  Input and Output Summary (last 24 hours): Intake/Output Summary (Last 24 hours) at 2020 1018  Last data filed at 2020 2000  Gross per 24 hour   Intake    Output 450 ml   Net -450 ml       Physical Exam:     Physical Exam   Constitutional: He is oriented to person, place, and time  He appears well-developed and well-nourished  No distress  HENT:   Head: Normocephalic and atraumatic  Nose: Nose normal    Eyes: Pupils are equal, round, and reactive to light  Conjunctivae and EOM are normal    Cardiovascular: Normal rate and regular rhythm  Exam reveals no gallop and no friction rub  No murmur heard  Pulmonary/Chest: Effort normal and breath sounds normal  No respiratory distress  Abdominal: Bowel sounds are normal  There is abdominal tenderness  Neurological: He is alert and oriented to person, place, and time  Skin: Skin is warm and dry  He is not diaphoretic  Psychiatric: He has a normal mood and affect   His behavior is normal  Judgment and thought content normal    Nursing note and vitals reviewed  Additional Data:     Labs: I have personally reviewed pertinent reports  Results from last 7 days   Lab Units 08/02/20  0609 08/01/20  0308 07/31/20  1901 07/30/20  1454   WBC Thousand/uL 6 15 12 99* 11 04* 6 68   HEMOGLOBIN g/dL 7 3* 7 8* 7 7* 8 3*   HEMATOCRIT % 22 2* 24 4* 23 9* 25 5*   PLATELETS Thousands/uL 225 244  241 265 320   NEUTROS PCT %  --  82* 83* 59   MONOS PCT %  --  5 4 9      Results from last 7 days   Lab Units 08/02/20  0609 08/01/20  0308 07/31/20  1901 07/30/20  1454   POTASSIUM mmol/L 5 1 5 1 5 3 5 6*   CHLORIDE mmol/L 101 101 99* 98*   CO2 mmol/L 28 28 31 31   BUN mg/dL 41* 37* 36* 37*   CREATININE mg/dL 12 62* 11 88* 11 89* 12 41*   CALCIUM mg/dL 8 6 9 0 8 6 9 2   ALK PHOS U/L  --  65 66 68   ALT U/L  --  15 22 26   AST U/L  --  15 16 17     Results from last 7 days   Lab Units 08/01/20  0308   MAGNESIUM mg/dL 1 9     Results from last 7 days   Lab Units 08/02/20  0609 08/01/20  0308   PHOSPHORUS mg/dL 7 3* 5 9*          Results from last 7 days   Lab Units 07/31/20  1901   LACTIC ACID mmol/L 1 1           * Additional Pertinent Lab Tests Reviewed: Ellen 66 Admission Reviewed    Radiology Results: I have personally reviewed pertinent reports  Ct Abdomen Pelvis Wo Contrast    Result Date: 7/31/2020  Impression: Wall thickening of the proximal sigmoid colon and distal descending colon could relate to underdistention or colitis  Moderate amount of abdominal and pelvic ascites with left-sided catheter terminating in the medial pelvis  Left lower lobe pulmonary nodules measuring up to 9 mm  This is minimally increased in size compared to the prior exams dating back to 2018  Follow-up recommended in 12 months  Trace pericardial effusion  Small hiatal hernia  Diffuse bladder wall thickening could relate to underdistention or cystitis, correlate with urinalysis   Workstation performed: [id number]       Recent Cultures (last 7 days):     Results from last 7 days Lab Units 08/01/20  1127 07/31/20 2000 07/31/20  1913 07/31/20  1902 07/30/20  1647 07/30/20  1501 07/30/20  1454   BLOOD CULTURE   --   --  No Growth at 24 hrs  No Growth at 24 hrs   --  No Growth at 48 hrs  No Growth at 48 hrs     GRAM STAIN RESULT  2+ Polys  No bacteria seen 3+ Polys  No bacteria seen  --   --   --   --   --    BODY FLUID CULTURE, STERILE   --  No growth  --   --   --   --   --    C DIFF TOXIN B   --   --   --   --  Negative  --   --        Last 24 Hours Medication List:   acetaminophen, 650 mg, Oral, Q6H PRN, Sudarshan Womack PA-C  aspirin, 81 mg, Oral, Daily, Sudarshan Womack PA-C  atorvastatin, 40 mg, Oral, HS, Sudarshan Womack PA-C  calcium acetate, 667 mg, Oral, TID With Meals, Sudarshan Womack PA-C  cefTRIAXone, 1,000 mg, Intravenous, Q24H, Sudarshan Womack PA-C, Last Rate: 1,000 mg (08/01/20 2346)  cholecalciferol, 2,000 Units, Oral, Daily, Sudarshan Womack PA-C  cinacalcet, 60 mg, Oral, Every Other Day, Sudarshan Womack PA-C  cloNIDine, 0 1 mg, Oral, BID PRN, Dariel Arauz MD  divalproex sodium, 500 mg, Oral, Daily, Sudarshan Womack PA-C  doxazosin, 2 mg, Oral, HS, Sudarshan Womack PA-C  escitalopram, 10 mg, Oral, Daily, Sudarshan Womack PA-C  famotidine, 10 mg, Oral, Daily, Sudarshan Womack PA-C  gabapentin, 100 mg, Oral, HS, Sudarshan Womack PA-C  gentamicin, , Topical, Daily, Jian Bronson MD  heparin (porcine), 5,000 Units, Subcutaneous, Q8H [address], Sudarshan Womack PA-C  hydrALAZINE, 10 mg, Intravenous, Q6H PRN, Sudarshan Womack PA-C  hydrALAZINE, 50 mg, Oral, Q8H [address], Jian Bronson MD  insulin glargine, 5 Units, Subcutaneous, After Breakfast, Sudarshan Womack PA-C  insulin lispro, 1-5 Units, Subcutaneous, HS, Sudarshan Womack PA-C  insulin lispro, 1-6 Units, Subcutaneous, TID AC, Sudarshan Womack PA-C  insulin lispro, 2 Units, Subcutaneous, Daily With Lunch, Sudarshan Womack PA-C  insulin lispro, 4 Units, Subcutaneous, Daily With Breakfast, Sudarshan Womack PA-C  insulin lispro, 4 Units, Subcutaneous, Daily With Dinner, Sudarshan Womack PA-C  insulin NPH, 5 Units, Subcutaneous, HS, Sudarshan Womack PA-C  labetalol, 300 mg, Oral, Q12H HALIE, Sudarshan Womack PA-C  lisinopril, 40 mg, Oral, Daily, Sudarshan Womack PA-C  metroNIDAZOLE, 500 mg, Intravenous, Q8H, Sudarshan Womack PA-C, Last Rate: 500 mg (08/02/20 0645)  NIFEdipine ER, 60 mg, Oral, BID, Sudarshan Womack PA-C  ondansetron, 4 mg, Intravenous, Q6H PRN, Otis Lo MD  vancomycin, 15 mg/kg (Adjusted), Intravenous, Daily PRN, Wesley Melo MD  vancomycin, 15 mg/kg (Adjusted), Intravenous, Once, Perfecto Clancy MD         Today, Patient Was Seen By: Elizabeth Vicente, [facility]  Internal Medicine Residency PGY-1    Portions of the record may have been created with voice recognition software  Occasional wrong word or "sound a like" substitutions may have occurred due to the inherent limitations of voice recognition software  Read the chart carefully and recognize, using context, where substitutions have occurred

## 2020-08-02 NOTE — ASSESSMENT & PLAN NOTE
· Presents with abdominal pain and rigors  Patient has been admitted for peritonitis in the past and was recently discharged dignosed with staph epidermis peritonitis and sent with a course of intraperitoneal Vancomycin for 14 days  · CT scan showed Wall thickening of the proximal sigmoid colon and distal descending colon could relate to underdistention or colitis  Moderate amount of abdominal and pelvic ascites with left-sided catheter terminating in the medial pelvis  C diff negative on 7/30   · Body cultures preliminary results from 1 culture showed no bacteria  · Follow body cultures   · Consulted ID and nephrology   · Follow WBC and VS - no leukocytosis noted on labs today  · Follow fecal calprotectin  · Awaiting results for stool enteric pathogens    · Patient may require removal of peritoneal catheter, surgery has been consulted  · NPO at midnight for possible procedure

## 2020-08-02 NOTE — PROGRESS NOTES
Progress Note - Infectious Disease   Almrl Mckeon 37 y o  male MRN: [id number]  Unit/Bed#: S -01 Encounter: [id number]      Impression/Plan:    61-year-old male patient with diabetes mellitus type 1, on peritoneal dialysis for the past 2 years, and recent episode of Staph epidermidis peritonitis treated conservatively with 14 days of intraperitoneal vancomycin, admitted for abdominal pain and fever     1- sepsis, POA:  Fever, tachycardia, mild leucocytosis noted on admission  Suspect sepsis secondary to peritonitis in setting of abdominal pain and recent history of peritonitis  CT scan showing possible colitis, though I doubt secondary peritenonitis from intraabdominal infectious focus  C diff toxin pcr negative  - continue intravenous antibiotics with vancomycin, and follow up final result of blood culture  - pharmacy follow up for vancomycin dosing  - follow up final result of fluid culture and blood culture and adjust antibiotics accordingly     2- peritonitis associated with peritoneal dialysis catheter:  First episode of peritonitis diagnosed 06/2020, at that time culture was positive for Staph epidermidis  Peritoneal dialysis catheter was kept in place, and patient received 14 days of intraperitoneal vancomycin through 07/01/2020  - concern that current episode of peritonitis represents a relapsing infection  - as fluid culture remains negative, will stop ceftriaxone and Flagyl, continue vancomycin IV  -  recommend removal of peritoneal dialysis catheter    This was discussed  with nephrologist  - follow-up final blood culture results     3- diabetes mellitus type 1:  This is likely the cause of end-stage renal disease  - management as per primary service     4- end-stage renal disease:  Patient has been on peritoneal dialysis for the past 2 years  He reports being on transplant list, workup done at [facility]  - continue outpatient follow-up with Nephrology    Plan mentioned above discussed patient and his mother present at the bedside      Antibiotics:  Ceftriaxone day 3  Flagyl day 3  Vancomycin day 3    Subjective:  Patient still having some nausea  No fever or chills in the past 24 hours    Objective:  Vitals:  Temp:  [97 9 °F (36 6 °C)-98 7 °F (37 1 °C)] 98 4 °F (36 9 °C)  HR:  [] 89  Resp:  [18-20] 18  BP: (182-208)/(60-82) 208/80  SpO2:  [94 %-100 %] 94 %  Temp (24hrs), Av 3 °F (36 8 °C), Min:97 9 °F (36 6 °C), Max:98 7 °F (37 1 °C)  Current: Temperature: 98 4 °F (36 9 °C)    Physical Exam:   General Appearance:  Alert, interactive, nontoxic, no acute distress  Throat: Oropharynx moist without lesions  Lungs:   Clear to auscultation bilaterally; no wheezes, rhonchi or rales; respirations unlabored   Heart:  RRR; no murmur, rub or gallop   Abdomen:   Soft, minimal diffuse tenderness, no purulence around the exit site of peritoneal dialysis catheter   Extremities: No clubbing, cyanosis or edema   Skin: No new rashes or lesions  No draining wounds noted  Labs, Imaging, & Other studies:   All pertinent labs and imaging studies were personally reviewed  Results from last 7 days   Lab Units 20  0620  0308 20  1901   WBC Thousand/uL 6 15 12 99* 11 04*   HEMOGLOBIN g/dL 7 3* 7 8* 7 7*   PLATELETS Thousands/uL 225 244  241 265     Results from last 7 days   Lab Units 20  0620  0308 20  1901 20  1454   SODIUM mmol/L 138 137 137 137   POTASSIUM mmol/L 5 1 5 1 5 3 5 6*   CHLORIDE mmol/L 101 101 99* 98*   CO2 mmol/L 28 28 31 31   BUN mg/dL 41* 37* 36* 37*   CREATININE mg/dL 12 62* 11 88* 11 89* 12 41*   EGFR ml/min/1 73sq m 4 5 5 5   CALCIUM mg/dL 8 6 9 0 8 6 9 2   AST U/L  --  15 16 17   ALT U/L  --  15 22 26   ALK PHOS U/L  --  65 66 68     Results from last 7 days   Lab Units 20  1913 20  1902 20  1647 20  1501 20  1454   BLOOD CULTURE   --  No Growth at 24 hrs  No Growth at 24 hrs  --  No Growth at 48 hrs  No Growth at 48 hrs     GRAM STAIN RESULT  3+ Polys  No bacteria seen  --   --   --   --   --    BODY FLUID CULTURE, STERILE  No growth  --   --   --   --   --    C DIFF TOXIN B   --   --   --  Negative  --   --      Results from last 7 days   Lab Units 08/01/20  0308 07/31/20  1901   PROCALCITONIN ng/ml 0 44* 0 27*

## 2020-08-02 NOTE — ASSESSMENT & PLAN NOTE
Lab Results   Component Value Date    HGBA1C 6 8 (H) 06/21/2020       Recent Labs     08/01/20  0747 08/01/20  1110 08/01/20  1653 08/01/20  2108   POCGLU 130 208* 220* 240*       Blood Sugar Average: Last 72 hrs:  (P) 196 2   · 30 year hx of type 1 diabetes with history of inconsistent sugars  Diagnosed at 1years old  · 1 dose of metoclopramide was given 07/31/2020  · Continue home medications including Lantus 5 units after breakfast   · Continue Humalog 4 units with breakfast, 2 units with lunch, 4 units with dinner  · Continue Novolin 4 units HS  · Sliding scale insulin  · Accu-Chewill Valadez

## 2020-08-02 NOTE — ASSESSMENT & PLAN NOTE
· Patient with peritoneal dialysis catheter in place  · Creatinine baseline from 12-14   Currently at 12 62  · Nephrology onboard  · May need to be removed given that this is likely a relapse of peritonitis 1-2 months ago

## 2020-08-02 NOTE — ED ATTENDING ATTESTATION
7/31/2020  I, Shell Mercado MD, saw and evaluated the patient  I have discussed the patient with the resident/non-physician practitioner and agree with the resident's/non-physician practitioner's findings, Plan of Care, and MDM as documented in the resident's/non-physician practitioner's note, except where noted  All available labs and Radiology studies were reviewed  I was present for key portions of any procedure(s) performed by the resident/non-physician practitioner and I was immediately available to provide assistance  At this point I agree with the current assessment done in the Emergency Department  I have conducted an independent evaluation of this patient a history and physical is as follows:    ED Course  ED Course as of Aug 02 0941   Fri Jul 31, 2020   1843 Pulse(!): 8   253 78-year-old male PMH hypertension, diabetes, cerebellar stroke, CKD on a peritoneal dialysis, gastroparesis presenting to the emergency department with epigastric abdominal pain  Patient states that he feels similar to the last time he had peritoneal infection  (-) nausea, (-) vomiting, (-) diarrea, (-) fever  Noted that peritoneal fluid looked cloudy  1858 Vital signs notable for hypertension and tachycardia  Blood Pressure(!): 222/93   1858 Pulse(!): 107   1858 Physical exam remarkable for a pale young male with abdominal tenderness, generalized  No rebound, no rigidity noted  1858 Differential diagnosis includes peritonitis, colitis, bowel obstruction, intra-abdominal infection, gastroparesis, DKA, sepsis  Appropriate lab work and imaging ordered  1859 Patient is complaining of severe pain  4 mg IV morphine ordered  5032 Patient complains of nausea  10 mg IV Reglan ordered for patient  1921 Lower than baseline  Patient is at this time refusing blood transfusion     Hemoglobin(!): 7 7   1953 Potassium: 5 3   1954 BUN(!): 36   1954 Creatinine(!): 11 89   1954 Glucose, Random(!): 258   2001 Wall thickening of the proximal sigmoid colon and distal descending colon could relate to underdistention or colitis      Moderate amount of abdominal and pelvic ascites with left-sided catheter terminating in the medial pelvis       Left lower lobe pulmonary nodules measuring up to 9 mm  This is minimally increased in size compared to the prior exams dating back to 2018  Follow-up recommended in 12 months      Trace pericardial effusion      Small hiatal hernia      Diffuse bladder wall thickening could relate to underdistention or cystitis, correlate with urinalysis  CT abdomen pelvis wo contrast   2003 Upon reassessment, patient's nausea has improved  Abdomen is soft, nondistended  After multiple reassessments, patient's nausea returned  Overall, patient continues to feel unwell  Case discussed with General Medicine for admission      Critical Care Time  Procedures

## 2020-08-02 NOTE — PROGRESS NOTES
Vancomycin IV Pharmacy-to-Dose Consultation    Chalice Bloch is a 37 y o  male who is currently receiving Vancomycin IV with management by the Pharmacy Consult service for the treatment of peritonitis  Assessment/Plan:    The patient's chart was reviewed  There are no signs or symptoms of nephrotoxicity and/or infusion reactions documented  Random level this morning resulted at 19 2 so will re-dose with 1250mg IV once since random level < 20  Based on today's assessment, will continue current vancomycin dosing of 1250mg IV when vancomycin random level < 20  Will plan for next random level to be drawn with AM labs on 8/3  We will continue to follow the patient's culture results and clinical progress daily        Severo Mustache, PharmD   Pharmacist

## 2020-08-02 NOTE — ASSESSMENT & PLAN NOTE
Patient has chronic anemia  H/H on labs this morning was 7 3/22 2  Consider transfusing if hemoglobin less than 7, however given that the patient is on the transplant list at [facility], the transfusion of blood could prevent him from getting transplanted  Discuss with patient and family before and transfuse patient if critically needed, and consider repeating Hgb/Hct before administartion    Consent obtained

## 2020-08-02 NOTE — PROGRESS NOTES
[address] [facility] NOTE   Magaly Mckeon 37 y o  male MRN: [id number]  Unit/Bed#: S -01 Encounter: [id number]  Reason for Consult: ESRD    ASSESSMENT and PLAN:    37 y  o  male  ESRD on PD, Type I DM, HTN, HPL, CVA, seizure homozygous for C677T MTHFR mutation and heterozygote for prothombin gene mutation who was admitted to [facility] after presenting with abd pain  Pt with recent admission for peritonitis in June for stephylococcus epidermidis  Pt was discharged to complete 2 weeks IP vancomycin  Catheter salvage was attempted  PD catheter related infection  Seen in ER on 7/30 for diarrhea       1) ESRD on PD     - outpatient unit [facility]  - outpatient prescription - 4 cycles of 2 2 L and 1 fill at 1 5L with last fill 1 2L for total 10 hours overnight and daily exchange at 4 pm?  -  5  - weight initially 106 kg on 7/31       Plan:  - daily weight  - gent ointment to PD catheter site daily  - cont antibiotics per ID team  - make NPO past midnight in case needs HD catheter placed   - will need surgery consultation for removal of PD catheter if repeat cultures from this admission are either unrevealing or similar bacteria as prior   Await culture data  - change PD to q 4 hour  - increase hydralazine to TID  - reviewed with Nursing team  - reviewed atif pt, patient mother about plan of potential need of catheter removal (PD) and placement of HD catheter as early as early this week --> understands plan  - vanc level per Pharmacy team     2) HTN     - prior was on hydralazine 50 mg TID, minoxidil 5 mg HS, doxazosin 4 mg BID, clonidine 0 1 mg TID, labetalol 300 mg BID, torsemide 100 mg BID, nifedipine 60 mg BID, lisinopril 40 mg daily  - most recent med list with clonidine 0 1 mg TID, doxazosin 4 mg BID, hydralazine 50 mg TID, labetalol 300 mg BID, lisinopril 40 mg daily, nifedipine 60 mg BID, torsemide 100 mg daily  - currently on doxazosin 2 mg daily, hydralazine 50 mg BID, labetalol 300 mg TID, metolazone 10 mg daily, nifedipine 60 mg BID, torsemide 100 mg BID, lisinopril 40  - clonidine can be added prn 0 1 if needed  - increase hydralazine back to TID  - holding diuretics for now due to possible vol depletion due to continued nausea  No vomiting this AM thus far  - consider restarting diuretics tomorrow      3) electrolytes     - hyperk improving overall     4) abd pain - peritonitis vs gut translocation leading to peritonitis     - abd pain  - concern for peritoneal source vs GI source  - CT scan with thickening of prox sigmoid colon  - PD fluid with elevated WBC count  - await PD culture  - UA 4-10 WBC  - daily PD fluid cell count  - may need PD catheter removal vs exchange depending on culture data and ID team recs  - vanc level 27 8 --> 19  - antibiotics started on admission     5) MBD     - was on phos binder, sensipar as outpatient     6) acid/base - bicarb stable   Lactic acid nl yest     7) anemia     -monitor  - need to obtain outpatient records Monday as pt does receive epogen as outpatient    SUBJECTIVE / INTERVAL HISTORY:    Pt with nausea this AM  Fever curve improving    OBJECTIVE:  Current Weight: Weight - Scale: 106 kg (233 lb 3 2 oz)  Vitals:    08/01/20 1537 08/01/20 2239 08/01/20 2300 08/02/20 0700   BP: (!) 192/82 (!) 182/60 (!) 188/79 (!) 208/80   BP Location: Right arm  Left arm Right arm   Pulse: 92 97 100 89   Resp: 18  20 18   Temp: 97 9 °F (36 6 °C)  98 7 °F (37 1 °C) 98 4 °F (36 9 °C)   TempSrc: Oral  Oral Oral   SpO2: 99%  100% 94%   Weight:       Height:           Intake/Output Summary (Last 24 hours) at 8/2/2020 0831  Last data filed at 8/1/2020 2000  Gross per 24 hour   Intake    Output 450 ml   Net -450 ml     General: NAD  Skin: no rash  Eyes: anicteric sclera  ENT: moist mucous membrane  Neck: supple  Chest: CTA b/l, no ronchii, no wheeze, no rubs, no rales  CVS: s1s2, no murmur, no gallop, no rub  Abdomen: soft, nl sounds, no sig tenderness on palpation  Extremities: no edema LE b/l  : no preston  Neuro: AAOX3  Psych: normal affect    Medications:    Current Facility-Administered Medications:     acetaminophen (TYLENOL) tablet 650 mg, 650 mg, Oral, Q6H PRN, Sudarshan Womack PA-C    aspirin chewable tablet 81 mg, 81 mg, Oral, Daily, Sudarshan Womack PA-C, 81 mg at 08/01/20 0815    atorvastatin (LIPITOR) tablet 40 mg, 40 mg, Oral, HS, Sudarshan Womack PA-C, 40 mg at 08/01/20 2244    calcium acetate (PHOSLO) capsule 667 mg, 667 mg, Oral, TID With Meals, Deon Sethi PA-C, Stopped at 08/01/20 1702    cefTRIAXone (ROCEPHIN) 1,000 mg in dextrose 5 % 50 mL IVPB, 1,000 mg, Intravenous, Q24H, Sudarshan Womack PA-C, Last Rate: 100 mL/hr at 08/01/20 2346, 1,000 mg at 08/01/20 2346    cholecalciferol (VITAMIN D3) tablet 2,000 Units, 2,000 Units, Oral, Daily, Sudarshan Womack PA-C, 2,000 Units at 08/01/20 1646    cinacalcet (SENSIPAR) tablet 60 mg, 60 mg, Oral, Every Other Day, Sudarshan Womack PA-C, 60 mg at 08/01/20 0357    cloNIDine (CATAPRES) tablet 0 1 mg, 0 1 mg, Oral, BID PRN, Glenn Morris MD    divalproex sodium (DEPAKOTE ER) 24 hr tablet 500 mg, 500 mg, Oral, Daily, Sudarshan Womack PA-C, 500 mg at 08/01/20 0815    doxazosin (CARDURA) tablet 2 mg, 2 mg, Oral, HS, Sudarshan Womack PA-C, 2 mg at 08/01/20 2239    escitalopram (LEXAPRO) tablet 10 mg, 10 mg, Oral, Daily, Sudarshan Womack PA-C, 10 mg at 08/01/20 2991    famotidine (PEPCID) tablet 10 mg, 10 mg, Oral, Daily, Sudarshan Womack PA-C, 10 mg at 08/01/20 0816    gabapentin (NEURONTIN) capsule 100 mg, 100 mg, Oral, HS, Sudarshan Womack PA-C, 100 mg at 08/01/20 2244    gentamicin (GARAMYCIN) 0 1 % ointment, , Topical, Daily, Laine Oppenheim, MD, Stopped at 08/01/20 7293    heparin (porcine) subcutaneous injection 5,000 Units, 5,000 Units, Subcutaneous, Q8H [address], 5,000 Units at 08/02/20 0555 **AND** [COMPLETED] Platelet count, , , Once, Sudarshan Womack PA-C    hydrALAZINE (APRESOLINE) injection 10 mg, 10 mg, Intravenous, Q6H PRN, Sudarshan Womack PA-C, 10 mg at 08/02/20 0825    hydrALAZINE (APRESOLINE) tablet 50 mg, 50 mg, Oral, BID, Sudarshan Womack PA-C, 50 mg at 08/01/20 1817    insulin glargine (LANTUS) subcutaneous injection 5 Units 0 05 mL, 5 Units, Subcutaneous, After Breakfast, Sudarshan Womack PA-C, 5 Units at 08/02/20 0827    insulin lispro (HumaLOG) 100 units/mL subcutaneous injection 1-5 Units, 1-5 Units, Subcutaneous, HS, Sudarshan Womack PA-C, 2 Units at 08/01/20 2245    insulin lispro (HumaLOG) 100 units/mL subcutaneous injection 1-6 Units, 1-6 Units, Subcutaneous, TID AC **AND** Fingerstick Glucose (POCT), , , TID AC, Sudarshan Womack PA-C    insulin lispro (HumaLOG) 100 units/mL subcutaneous injection 2 Units, 2 Units, Subcutaneous, Daily With Lunch, Sudarshan Womack PA-C    insulin lispro (HumaLOG) 100 units/mL subcutaneous injection 4 Units, 4 Units, Subcutaneous, Daily With Breakfast, Sudarshan Womack PA-C, Stopped at 08/01/20 0819    insulin lispro (HumaLOG) 100 units/mL subcutaneous injection 4 Units, 4 Units, Subcutaneous, Daily With Dinner, Sudarshan Womack PA-C    insulin NPH (HumuLIN N,NovoLIN N) 100 Units/mL subcutaneous injection 5 Units, 5 Units, Subcutaneous, HS, Sudarshan Womack PA-C, 5 Units at 08/01/20 2246    labetalol (NORMODYNE) tablet 300 mg, 300 mg, Oral, Q12H [facility], Sudarshan Womack PA-C, 300 mg at 08/01/20 2240    lisinopril (ZESTRIL) tablet 40 mg, 40 mg, Oral, Daily, Sudarshan Womack PA-C, 40 mg at 08/01/20 1820    metroNIDAZOLE (FLAGYL) IVPB (premix) 500 mg 100 mL, 500 mg, Intravenous, Q8H, Sudarshan Womack PA-C, Last Rate: 200 mL/hr at 08/02/20 0645, 500 mg at 08/02/20 0645    NIFEdipine (PROCARDIA XL) 24 hr tablet 60 mg, 60 mg, Oral, BID, Sudarshan Womack PA-C, 60 mg at 08/01/20 1817    ondansetron (ZOFRAN) injection 4 mg, 4 mg, Intravenous, Q6H PRN, Naveen Roach MD, 4 mg at 08/02/20 0828    vancomycin (VANCOCIN) 1,250 mg in sodium chloride 0 9 % 250 mL IVPB, 15 mg/kg (Adjusted), Intravenous, Daily PRN, Wesley Melo MD    vancomycin (VANCOCIN) 1,250 mg in sodium chloride 0 9 % 250 mL IVPB, 15 mg/kg (Adjusted), Intravenous, Once, Emse Pablo MD    Laboratory Results:  Results from last 7 days   Lab Units 08/02/20  0609 08/01/20  0308 07/31/20  1901 07/30/20  1454   WBC Thousand/uL 6 15 12 99* 11 04* 6 68   HEMOGLOBIN g/dL 7 3* 7 8* 7 7* 8 3*   HEMATOCRIT % 22 2* 24 4* 23 9* 25 5*   PLATELETS Thousands/uL 225 244  241 265 320   POTASSIUM mmol/L 5 1 5 1 5 3 5 6*   CHLORIDE mmol/L 101 101 99* 98*   CO2 mmol/L 28 28 31 31   BUN mg/dL 41* 37* 36* 37*   CREATININE mg/dL 12 62* 11 88* 11 89* 12 41*   CALCIUM mg/dL 8 6 9 0 8 6 9 2   MAGNESIUM mg/dL  --  1 9  --   --    PHOSPHORUS mg/dL 7 3* 5 9*  --   --

## 2020-08-02 NOTE — CONSULTS
Consultation - General Surgery   Mag Shiva Mckeon 37 y o  male MRN: [id number]  Unit/Bed#: S -01 Encounter: [id number]        Assessment:  37y o  year old male with T1DM, HTN, CVA on ASA, ESRD on PD is admitted to the hospital for likely bacterial peritonitis from his PD catheter  Plan:  Follow up PD catheter fluid cultures  Defer to nephrology for if/when to remove PD catheter  Alternative to PD such as permacath/HD per nephrology  Care per primary team    History of Present Illness HPI:  José Antonio Chandler is a 37 y o  male who had an episode of Staph epidermis peritonitis in June 2020 which resolved with 14 days of intraperitoneal vancomycin  He presented to the ED on 7/30 with nausea/vomiting/diarrhea/chills and abdominal pain and was admitted for likely recurrent bacterial peritonitis  Blood and PD catheter fluid cultures were drawn and he was started on empirical antibiotics  General surgery was consulted to evaluate the patient for possible PD catheter removal pending the results of the fluid cultures  Physical Exam   Constitutional: He is oriented to person, place, and time  He appears well-developed and well-nourished  HENT:   Head: Normocephalic and atraumatic  Cardiovascular: Normal rate and regular rhythm  Pulmonary/Chest: Effort normal and breath sounds normal    Abdominal: Soft  He exhibits no distension and no mass  There is abdominal tenderness (mild, diffuse)  There is no rebound and no guarding  No hernia  Not concerning for an acute/surgical abdomen   Neurological: He is alert and oriented to person, place, and time  Skin: Skin is warm and dry  No erythema  PD catheter site not erythematous, no skin breakdown, no drainage, no induration, no tenderness, no signs of cellulitis or other skin/soft tissue infection/abscess  Nursing note and vitals reviewed        Review of Systems    Objective         Intake/Output Summary (Last 24 hours) at 8/2/2020 1220  Last data filed at 8/1/2020 2000  Gross per 24 hour   Intake    Output 450 ml   Net -450 ml       First Vitals:   Blood Pressure: (!) 222/93 (07/31/20 1705)  Pulse: (!) 107 (07/31/20 1701)  Temperature: 99 °F (37 2 °C) (07/31/20 1701)  Temp Source: Oral (07/31/20 1701)  Respirations: 16 (07/31/20 1701)  Height: 5' 10" (07/31/20 2313)  Weight - Scale: 106 kg (233 lb 3 2 oz) (07/31/20 1700)  SpO2: 94 % (07/31/20 1701)    Current Vitals:   Blood Pressure: (!) 184/98 (08/02/20 0924)  Pulse: 89 (08/02/20 0700)  Temperature: 98 4 °F (36 9 °C) (08/02/20 0700)  Temp Source: Oral (08/02/20 0700)  Respirations: 18 (08/02/20 0700)  Height: 5' 10" (07/31/20 2313)  Weight - Scale: 106 kg (233 lb 3 2 oz) (07/31/20 1700)  SpO2: 94 % (08/02/20 0700)    Invasive Devices     Peripheral Intravenous Line            Peripheral IV 07/31/20 Right Antecubital 1 day                Imaging: I have personally reviewed pertinent reports  Ct Abdomen Pelvis Wo Contrast    Result Date: 7/31/2020  Impression: Wall thickening of the proximal sigmoid colon and distal descending colon could relate to underdistention or colitis  Moderate amount of abdominal and pelvic ascites with left-sided catheter terminating in the medial pelvis  Left lower lobe pulmonary nodules measuring up to 9 mm  This is minimally increased in size compared to the prior exams dating back to 2018  Follow-up recommended in 12 months  Trace pericardial effusion  Small hiatal hernia  Diffuse bladder wall thickening could relate to underdistention or cystitis, correlate with urinalysis  Workstation performed: [id number]       EKG, Pathology, and Other Studies: I have personally reviewed pertinent reports      VTE Pharmacologic Prophylaxis: Heparin  VTE Mechanical Prophylaxis: sequential compression device    Historical Information   Past Medical History:   Diagnosis Date    Acute kidney injury ([facility] [address] )     Anxiety     Cerebellar stroke side undetermined 2015 2015,1/2018    Diabetes type 1, controlled ([facility] [address] )     IDDM    Diarrhea     Gastroparesis     History of shingles 2010    History of transfusion 2018    Hypertension     Muscle weakness     general unsteadiness    Retinopathy      Past Surgical History:   Procedure Laterality Date    CARDIAC LOOP RECORDER  2018    EGD      EYE SURGERY      PERITONEAL CATHETER INSERTION N/A 2018    Procedure: UNROOF PD CATHETER;  Surgeon: Aleyda Rosario DO;  [facility];  Service: General    DC ESOPHAGOGASTRODUODENOSCOPY TRANSORAL DIAGNOSTIC N/A 2019    Procedure: ESOPHAGOGASTRODUODENOSCOPY (EGD); Surgeon: Kaleigh Painting MD;  Location[facility];   Service: Gastroenterology    DC LAP INSERTION TUNNELED INTRAPERITONEAL CATHETER N/A 2018    Procedure: LAPAROSCOPIC PD CATHETER PLACEMENT;  Surgeon: Aleyda Rosario DO;  [facility];  Service: General    TONSILLECTOMY       Social History   Social History     Substance and Sexual Activity   Alcohol Use Not Currently    Comment: rarely     Social History     Substance and Sexual Activity   Drug Use Yes    Frequency: 5 0 times per week    Types: Marijuana    Comment: medical     Social History     Tobacco Use   Smoking Status Former Smoker    Packs/day: 0 50    Years: 12 00    Pack years: 6 00    Types: Cigarettes    Last attempt to quit: 2018    Years since quittin 5   Smokeless Tobacco Former User   Tobacco Comment    quit 2018     Family History   Problem Relation Age of Onset    Breast cancer Mother     Hypertension Mother     Hyperlipidemia Father     Hypertension Father     Leukemia Maternal Grandmother     Hyperlipidemia Maternal Grandfather     Hypertension Maternal Grandfather     Hyperlipidemia Paternal Grandmother     Hypertension Paternal Grandmother     Heart disease Paternal Grandfather         cardiac disorder    Diabetes Paternal Grandfather        Meds/Allergies   all current active meds have been reviewed, current meds:   Current Facility-Administered Medications   Medication Dose Route Frequency    acetaminophen (TYLENOL) tablet 650 mg  650 mg Oral Q6H PRN    aspirin chewable tablet 81 mg  81 mg Oral Daily    atorvastatin (LIPITOR) tablet 40 mg  40 mg Oral HS    calcium acetate (PHOSLO) capsule 667 mg  667 mg Oral TID With Meals    cefTRIAXone (ROCEPHIN) 1,000 mg in dextrose 5 % 50 mL IVPB  1,000 mg Intravenous Q24H    cholecalciferol (VITAMIN D3) tablet 2,000 Units  2,000 Units Oral Daily    cinacalcet (SENSIPAR) tablet 60 mg  60 mg Oral Every Other Day    cloNIDine (CATAPRES) tablet 0 1 mg  0 1 mg Oral BID PRN    divalproex sodium (DEPAKOTE ER) 24 hr tablet 500 mg  500 mg Oral Daily    doxazosin (CARDURA) tablet 2 mg  2 mg Oral HS    escitalopram (LEXAPRO) tablet 10 mg  10 mg Oral Daily    famotidine (PEPCID) tablet 10 mg  10 mg Oral Daily    gabapentin (NEURONTIN) capsule 100 mg  100 mg Oral HS    gentamicin (GARAMYCIN) 0 1 % ointment   Topical Daily    heparin (porcine) subcutaneous injection 5,000 Units  5,000 Units Subcutaneous Q8H [facility]    hydrALAZINE (APRESOLINE) injection 10 mg  10 mg Intravenous Q6H PRN    hydrALAZINE (APRESOLINE) tablet 50 mg  50 mg Oral Q8H [facility]    insulin glargine (LANTUS) subcutaneous injection 5 Units 0 05 mL  5 Units Subcutaneous After Breakfast    insulin lispro (HumaLOG) 100 units/mL subcutaneous injection 1-5 Units  1-5 Units Subcutaneous HS    insulin lispro (HumaLOG) 100 units/mL subcutaneous injection 1-6 Units  1-6 Units Subcutaneous TID AC    insulin lispro (HumaLOG) 100 units/mL subcutaneous injection 2 Units  2 Units Subcutaneous Daily With Lunch    insulin lispro (HumaLOG) 100 units/mL subcutaneous injection 4 Units  4 Units Subcutaneous Daily With Breakfast    insulin lispro (HumaLOG) 100 units/mL subcutaneous injection 4 Units  4 Units Subcutaneous Daily With Dinner    insulin NPH (HumuLIN N,NovoLIN N) 100 Units/mL subcutaneous injection 5 Units  5 Units Subcutaneous HS    labetalol (NORMODYNE) tablet 300 mg  300 mg Oral Q12H [address]    lisinopril (ZESTRIL) tablet 40 mg  40 mg Oral Daily    metroNIDAZOLE (FLAGYL) IVPB (premix) 500 mg 100 mL  500 mg Intravenous Q8H    NIFEdipine (PROCARDIA XL) 24 hr tablet 60 mg  60 mg Oral BID    ondansetron (ZOFRAN) injection 4 mg  4 mg Intravenous Q6H PRN    vancomycin (VANCOCIN) 1,250 mg in sodium chloride 0 9 % 250 mL IVPB  15 mg/kg (Adjusted) Intravenous Daily PRN    and PTA meds:   Prior to Admission Medications   Prescriptions Last Dose Informant Patient Reported? Taking? AVASTIN 100 MG/4ML   Yes No   B-D ULTRAFINE III SHORT PEN 31G X 8 MM MISC   No No   Sig: USE 1 PEN NEEDLE 8 TIMES DAILY   Cholecalciferol (VITAMIN D) 2000 units CAPS 2020 at Unknown time Mother Yes Yes   Sig: Take 1 capsule by mouth daily   GLUCAGON EMERGENCY 1 MG injection   Yes No   Sig: INJECT 1MG SUBCUTANEOUSLY AS NEEDED (AS DIRECTED)   MAY REPEAT DOSE EVERY 20 MINUTES AS NEEDED   NIFEdipine ER (ADALAT CC) 60 MG 24 hr tablet   No No   Sig: Take 1 tablet (60 mg total) by mouth 2 (two) times a day   Probiotic Product (PROBIOTIC DAILY) CAPS  Self Yes No   Sig: Take 1 capsule by mouth daily at bedtime    Sucroferric Oxyhydroxide (VELPHORO PO)   Yes No   Si mg 3 (three) times a day with meals    aspirin 81 mg chewable tablet 2020 at Unknown time Mother Yes Yes   Sig: Chew 81 mg daily   atorvastatin (LIPITOR) 40 mg tablet 2020 at Unknown time  No Yes   Sig: Take 1 tablet (40 mg total) by mouth every evening   Patient taking differently: Take 40 mg by mouth daily at bedtime    b complex vitamins capsule 2020 at Unknown time Mother Yes Yes   Sig: Take 1 capsule by mouth daily   calcium acetate (PHOSLO) 667 mg capsule 2020 at Unknown time Mother No Yes   Sig: Take 1 capsule (667 mg total) by mouth 3 (three) times a day with meals   cinacalcet (SENSIPAR) 60 MG tablet 2020 at Unknown time  No Yes   Sig: Take 1 tablet (60 mg total) by mouth daily   Patient taking differently: Take 60 mg by mouth every other day    cloNIDine (CATAPRES) 0 1 mg tablet  Self Yes Yes   Sig: Take 0 1 mg by mouth as needed May add another 0 1 as needed   divalproex sodium (DEPAKOTE ER) 250 mg 24 hr tablet 7/31/2020 at Unknown time  No Yes   Sig: Take 1 tablet (250 mg total) by mouth daily For 14 days then stop   doxazosin (CARDURA) 2 mg tablet 7/30/2020 at Unknown time Mother No Yes   Sig: Take 1 tablet (2 mg total) by mouth daily at bedtime   Patient taking differently: Take 2 mg by mouth daily    escitalopram (LEXAPRO) 10 mg tablet 7/31/2020 at Unknown time  No Yes   Sig: Take 1 tablet (10 mg total) by mouth daily   famotidine (PEPCID) 20 mg tablet 7/31/2020 at Unknown time  No Yes   Sig: Take 1 tablet (20 mg total) by mouth 2 (two) times a day   gabapentin (NEURONTIN) 100 mg capsule 7/31/2020 at Unknown time  Yes Yes   Sig: Take 100 mg by mouth daily at bedtime   gentamicin (GARAMYCIN) 0 1 % cream   No No   Sig: APPLY TO EXIT SITE ONCE DAILY   hydrALAZINE (APRESOLINE) 100 MG tablet  Self No No   Sig: Take 0 5 tablets (50 mg total) by mouth 3 (three) times a day   Patient taking differently: Take 50 mg by mouth 2 (two) times a day    insulin NPH (HumuLIN N,NovoLIN N) 100 Units/mL subcutaneous injection   Yes No   Sig: Inject 4 Units under the skin daily at bedtime   insulin glargine (LANTUS) 100 units/mL subcutaneous injection   Yes No   Sig: Inject 5 Units under the skin daily after breakfast   insulin lispro (Admelog SoloStar) 100 units/mL injection pen   No No   Sig: Take 4 units with breakfast, 2 units with lunch, and 4 units with dinner   labetalol (NORMODYNE) 300 mg tablet 7/31/2020 at Unknown time  No Yes   Sig: Take 1 tablet (300 mg total) by mouth every 12 (twelve) hours   lisinopril (ZESTRIL) 40 mg tablet 7/31/2020 at Unknown time Mother Yes Yes   Sig: Take 40 mg by mouth daily   metolazone (ZAROXOLYN) 10 mg tablet Past Week at Unknown time  Yes Yes   Sig: Take 10 mg by mouth 3 (three) times a week   ondansetron (ZOFRAN) 4 mg tablet   No No   Sig: Take 1 tablet (4 mg total) by mouth every 8 (eight) hours as needed for nausea or vomiting   Patient taking differently: Take 4 mg by mouth every 8 (eight) hours as needed for nausea or vomiting    torsemide (DEMADEX) 100 mg tablet  Mother Yes No   Sig: Take 100 mg by mouth 2 (two) times a day       Facility-Administered Medications: None     Allergies   Allergen Reactions    Sulfa Antibiotics Rash       Lab Results: I have personally reviewed pertinent lab results  , CBC:   Lab Results   Component Value Date    WBC 6 15 08/02/2020    HGB 7 3 (L) 08/02/2020    HCT 22 2 (L) 08/02/2020    MCV 99 (H) 08/02/2020     08/02/2020    MCH 32 4 08/02/2020    MCHC 32 9 08/02/2020    RDW 15 0 08/02/2020    MPV 10 3 08/02/2020   , CMP:   Lab Results   Component Value Date    SODIUM 138 08/02/2020    K 5 1 08/02/2020     08/02/2020    CO2 28 08/02/2020    BUN 41 (H) 08/02/2020    CREATININE 12 62 (H) 08/02/2020    CALCIUM 8 6 08/02/2020    EGFR 4 08/02/2020       Counseling / Coordination of Care  Total floor / unit time spent today 25 minutes  Greater than 50% of total time was spent with the patient and / or family counseling and / or coordination of care            Inpatient consult to Acute Care Surgery     Date/Time 8/2/2020 12:35 PM     Performed by  Cat Ulloa MD     Authorized by DO Cat De La Paz MD  8/2/2020 12:20 PM

## 2020-08-03 ENCOUNTER — TELEPHONE (OUTPATIENT)
Dept: NEUROLOGY | Facility: CLINIC | Age: 35
End: 2020-08-03

## 2020-08-03 LAB
ANION GAP SERPL CALCULATED.3IONS-SCNC: 10 MMOL/L (ref 4–13)
APPEARANCE FLD: CLEAR
BASOPHILS # BLD AUTO: 0.05 THOUSANDS/ΜL (ref 0–0.1)
BASOPHILS NFR BLD AUTO: 1 % (ref 0–1)
BUN SERPL-MCNC: 39 MG/DL (ref 5–25)
CALCIUM SERPL-MCNC: 9.1 MG/DL (ref 8.3–10.1)
CHLORIDE SERPL-SCNC: 99 MMOL/L (ref 100–108)
CO2 SERPL-SCNC: 30 MMOL/L (ref 21–32)
COLOR FLD: COLORLESS
CREAT SERPL-MCNC: 12.96 MG/DL (ref 0.6–1.3)
EOSINOPHIL # BLD AUTO: 0.59 THOUSAND/ΜL (ref 0–0.61)
EOSINOPHIL NFR BLD AUTO: 10 % (ref 0–6)
EOSINOPHIL NFR FLD MANUAL: 1 %
ERYTHROCYTE [DISTWIDTH] IN BLOOD BY AUTOMATED COUNT: 14.7 % (ref 11.6–15.1)
GFR SERPL CREATININE-BSD FRML MDRD: 4 ML/MIN/1.73SQ M
GLUCOSE SERPL-MCNC: 108 MG/DL (ref 65–140)
GLUCOSE SERPL-MCNC: 129 MG/DL (ref 65–140)
GLUCOSE SERPL-MCNC: 149 MG/DL (ref 65–140)
GLUCOSE SERPL-MCNC: 197 MG/DL (ref 65–140)
GLUCOSE SERPL-MCNC: 281 MG/DL (ref 65–140)
GLUCOSE SERPL-MCNC: 319 MG/DL (ref 65–140)
GLUCOSE SERPL-MCNC: 86 MG/DL (ref 65–140)
HCT VFR BLD AUTO: 25.1 % (ref 36.5–49.3)
HGB BLD-MCNC: 8.1 G/DL (ref 12–17)
IMM GRANULOCYTES # BLD AUTO: 0.01 THOUSAND/UL (ref 0–0.2)
IMM GRANULOCYTES NFR BLD AUTO: 0 % (ref 0–2)
LYMPHOCYTES # BLD AUTO: 1.11 THOUSANDS/ΜL (ref 0.6–4.47)
LYMPHOCYTES NFR BLD AUTO: 18 %
LYMPHOCYTES NFR BLD AUTO: 19 % (ref 14–44)
MCH RBC QN AUTO: 32 PG (ref 26.8–34.3)
MCHC RBC AUTO-ENTMCNC: 32.3 G/DL (ref 31.4–37.4)
MCV RBC AUTO: 99 FL (ref 82–98)
MONOCYTES # BLD AUTO: 0.5 THOUSAND/ΜL (ref 0.17–1.22)
MONOCYTES NFR BLD AUTO: 77 %
MONOCYTES NFR BLD AUTO: 8 % (ref 4–12)
NEUTROPHILS # BLD AUTO: 3.7 THOUSANDS/ΜL (ref 1.85–7.62)
NEUTS SEG NFR BLD AUTO: 4 %
NEUTS SEG NFR BLD AUTO: 62 % (ref 43–75)
NRBC BLD AUTO-RTO: 0 /100 WBCS
PLATELET # BLD AUTO: 261 THOUSANDS/UL (ref 149–390)
PMV BLD AUTO: 9.9 FL (ref 8.9–12.7)
POTASSIUM SERPL-SCNC: 4.1 MMOL/L (ref 3.5–5.3)
RBC # BLD AUTO: 2.53 MILLION/UL (ref 3.88–5.62)
SITE: NORMAL
SODIUM SERPL-SCNC: 139 MMOL/L (ref 136–145)
TOTAL CELLS COUNTED SPEC: 100
VANCOMYCIN SERPL-MCNC: 27.7 UG/ML
WBC # BLD AUTO: 5.96 THOUSAND/UL (ref 4.31–10.16)
WBC # FLD MANUAL: 203 /UL

## 2020-08-03 PROCEDURE — 99232 SBSQ HOSP IP/OBS MODERATE 35: CPT | Performed by: INTERNAL MEDICINE

## 2020-08-03 PROCEDURE — 99233 SBSQ HOSP IP/OBS HIGH 50: CPT | Performed by: INTERNAL MEDICINE

## 2020-08-03 PROCEDURE — 89051 BODY FLUID CELL COUNT: CPT | Performed by: INTERNAL MEDICINE

## 2020-08-03 PROCEDURE — 82948 REAGENT STRIP/BLOOD GLUCOSE: CPT

## 2020-08-03 PROCEDURE — 80048 BASIC METABOLIC PNL TOTAL CA: CPT | Performed by: INTERNAL MEDICINE

## 2020-08-03 PROCEDURE — 99232 SBSQ HOSP IP/OBS MODERATE 35: CPT | Performed by: HOSPITALIST

## 2020-08-03 PROCEDURE — 80202 ASSAY OF VANCOMYCIN: CPT | Performed by: HOSPITALIST

## 2020-08-03 PROCEDURE — 85025 COMPLETE CBC W/AUTO DIFF WBC: CPT | Performed by: PSYCHIATRY & NEUROLOGY

## 2020-08-03 RX ORDER — HYDRALAZINE HYDROCHLORIDE 50 MG/1
50 TABLET, FILM COATED ORAL SEE ADMIN INSTRUCTIONS
Status: ON HOLD | COMMUNITY
Start: 2020-07-30 | End: 2020-08-03 | Stop reason: SDUPTHER

## 2020-08-03 RX ORDER — HYDRALAZINE HYDROCHLORIDE 20 MG/ML
10 INJECTION INTRAMUSCULAR; INTRAVENOUS EVERY 4 HOURS PRN
Status: DISCONTINUED | OUTPATIENT
Start: 2020-08-03 | End: 2020-08-06 | Stop reason: HOSPADM

## 2020-08-03 RX ADMIN — NIFEDIPINE 60 MG: 30 TABLET, EXTENDED RELEASE ORAL at 17:37

## 2020-08-03 RX ADMIN — HEPARIN SODIUM 5000 UNITS: 5000 INJECTION INTRAVENOUS; SUBCUTANEOUS at 05:21

## 2020-08-03 RX ADMIN — INSULIN HUMAN 5 UNITS: 100 INJECTION, SUSPENSION SUBCUTANEOUS at 22:19

## 2020-08-03 RX ADMIN — ONDANSETRON 4 MG: 2 INJECTION INTRAMUSCULAR; INTRAVENOUS at 07:30

## 2020-08-03 RX ADMIN — NIFEDIPINE 60 MG: 30 TABLET, EXTENDED RELEASE ORAL at 08:33

## 2020-08-03 RX ADMIN — HEPARIN SODIUM 5000 UNITS: 5000 INJECTION INTRAVENOUS; SUBCUTANEOUS at 22:19

## 2020-08-03 RX ADMIN — HYDRALAZINE HYDROCHLORIDE 50 MG: 25 TABLET ORAL at 13:45

## 2020-08-03 RX ADMIN — HEPARIN SODIUM 5000 UNITS: 5000 INJECTION INTRAVENOUS; SUBCUTANEOUS at 13:45

## 2020-08-03 RX ADMIN — HYDRALAZINE HYDROCHLORIDE 10 MG: 20 INJECTION INTRAMUSCULAR; INTRAVENOUS at 22:30

## 2020-08-03 RX ADMIN — HYDRALAZINE HYDROCHLORIDE 50 MG: 25 TABLET ORAL at 05:21

## 2020-08-03 RX ADMIN — HEPARIN SODIUM: 1000 INJECTION INTRAVENOUS; SUBCUTANEOUS at 08:26

## 2020-08-03 RX ADMIN — ONDANSETRON 8 MG: 2 INJECTION INTRAMUSCULAR; INTRAVENOUS at 20:20

## 2020-08-03 RX ADMIN — DIVALPROEX SODIUM 500 MG: 500 TABLET, FILM COATED, EXTENDED RELEASE ORAL at 08:34

## 2020-08-03 RX ADMIN — CALCIUM ACETATE 667 MG: 667 CAPSULE ORAL at 16:05

## 2020-08-03 RX ADMIN — HEPARIN SODIUM: 1000 INJECTION INTRAVENOUS; SUBCUTANEOUS at 04:30

## 2020-08-03 RX ADMIN — INSULIN LISPRO 3 UNITS: 100 INJECTION, SOLUTION INTRAVENOUS; SUBCUTANEOUS at 22:20

## 2020-08-03 RX ADMIN — HEPARIN SODIUM: 1000 INJECTION INTRAVENOUS; SUBCUTANEOUS at 22:18

## 2020-08-03 RX ADMIN — LABETALOL HYDROCHLORIDE 300 MG: 100 TABLET, FILM COATED ORAL at 08:33

## 2020-08-03 RX ADMIN — HYDRALAZINE HYDROCHLORIDE 10 MG: 20 INJECTION INTRAMUSCULAR; INTRAVENOUS at 07:30

## 2020-08-03 RX ADMIN — HYDRALAZINE HYDROCHLORIDE 10 MG: 20 INJECTION INTRAMUSCULAR; INTRAVENOUS at 11:11

## 2020-08-03 RX ADMIN — GENTAMICIN SULFATE: 1 OINTMENT TOPICAL at 17:42

## 2020-08-03 NOTE — PLAN OF CARE
Problem: Potential for Falls  Goal: Patient will remain free of falls  Description: INTERVENTIONS:  - Assess patient frequently for physical needs  -  Identify cognitive and physical deficits and behaviors that affect risk of falls    -  [place] fall precautions as indicated by assessment   - Educate patient/family on patient safety including physical limitations  - Instruct patient to call for assistance with activity based on assessment  - Modify environment to reduce risk of injury  - Consider OT/PT consult to assist with strengthening/mobility  Outcome: Progressing     Problem: PAIN - ADULT  Goal: Verbalizes/displays adequate comfort level or baseline comfort level  Description: Interventions:  - Encourage patient to monitor pain and request assistance  - Assess pain using appropriate pain scale  - Administer analgesics based on type and severity of pain and evaluate response  - Implement non-pharmacological measures as appropriate and evaluate response  - Consider cultural and social influences on pain and pain management  - Notify physician/advanced practitioner if interventions unsuccessful or patient reports new pain  Outcome: Progressing     Problem: INFECTION - ADULT  Goal: Absence or prevention of progression during hospitalization  Description: INTERVENTIONS:  - Assess and monitor for signs and symptoms of infection  - Monitor lab/diagnostic results  - Monitor all insertion sites, i e  indwelling lines, tubes, and drains  - Monitor endotracheal if appropriate and nasal secretions for changes in amount and color  - [place] appropriate cooling/warming therapies per order  - Administer medications as ordered  - Instruct and encourage patient and family to use good hand hygiene technique  - Identify and instruct in appropriate isolation precautions for identified infection/condition  Outcome: Progressing  Goal: Absence of fever/infection during neutropenic period  Description: INTERVENTIONS:  - Monitor WBC    Outcome: Progressing     Problem: SAFETY ADULT  Goal: Maintain or return to baseline ADL function  Description: INTERVENTIONS:  -  Assess patient's ability to carry out ADLs; assess patient's baseline for ADL function and identify physical deficits which impact ability to perform ADLs (bathing, care of mouth/teeth, toileting, grooming, dressing, etc )  - Assess/evaluate cause of self-care deficits   - Assess range of motion  - Assess patient's mobility; develop plan if impaired  - Assess patient's need for assistive devices and provide as appropriate  - Encourage maximum independence but intervene and supervise when necessary  - Involve family in performance of ADLs  - Assess for home care needs following discharge   - Consider OT consult to assist with ADL evaluation and planning for discharge  - Provide patient education as appropriate  Outcome: Progressing  Goal: Maintain or return mobility status to optimal level  Description: INTERVENTIONS:  - Assess patient's baseline mobility status (ambulation, transfers, stairs, etc )    - Identify cognitive and physical deficits and behaviors that affect mobility  - Identify mobility aids required to assist with transfers and/or ambulation (gait belt, sit-to-stand, lift, walker, cane, etc )  - [place] fall precautions as indicated by assessment  - Record patient progress and toleration of activity level on Mobility SBAR; progress patient to next Phase/Stage  - Instruct patient to call for assistance with activity based on assessment  - Consider rehabilitation consult to assist with strengthening/weightbearing, etc   Outcome: Progressing     Problem: DISCHARGE PLANNING  Goal: Discharge to home or other facility with appropriate resources  Description: INTERVENTIONS:  - Identify barriers to discharge w/patient and caregiver  - Arrange for needed discharge resources and transportation as appropriate  - Identify discharge learning needs (meds, wound care, etc )  - Arrange for interpretive services to assist at discharge as needed  - Refer to Case Management Department for coordinating discharge planning if the patient needs post-hospital services based on physician/advanced practitioner order or complex needs related to functional status, cognitive ability, or social support system  Outcome: Progressing     Problem: Knowledge Deficit  Goal: Patient/family/caregiver demonstrates understanding of disease process, treatment plan, medications, and discharge instructions  Description: Complete learning assessment and assess knowledge base    Interventions:  - Provide teaching at level of understanding  - Provide teaching via preferred learning methods  Outcome: Progressing

## 2020-08-03 NOTE — PROGRESS NOTES
Progress Note - Infectious Disease   Coral Lorraine Mckeon 37 y o  male MRN: [id number]  Unit/Bed#: S -01 Encounter: [id number]      Impression/Plan:  1- sepsis, POA:  Fever, tachycardia, mild leucocytosis  Suspect sepsis secondary to peritonitis in setting of abdominal pain and recent history of peritonitis  Admission blood cultures negative at 48 hours  7/31 Peritoneal fluid growing enterococcus species from broth  CT scan showing possible colitis  C diff tox pcr negative    - continue intravenous vancomycin at present  - follow up final result of blood culture  - pharmacy follow up for vancomycin dosing  - follow up final result of fluid culture adjust antibiotics accordingly     2- peritonitis associated with peritoneal dialysis catheter:  First episode of peritonitis diagnosed 06/2020, at that time culture was positive for Staph epidermidis   Peritoneal dialysis catheter was kept in place, and patient received 14 days of intraperitoneal vancomycin through 07/01/2020 7/31 Peritoneal fluid growing enterococcus species from broth    - Continues Vancomycin pending final fluid culture  - follow-up final fluid culture result     3- diabetes mellitus type 1:  This is likely the cause of end-stage renal disease  - management as per primary service     4- end-stage renal disease:  Patient has been on peritoneal dialysis for the past 2 years  He reports being on transplant list, workup done at [address] follow-up with Nephrology     Above plan discussed in detail with patient and his mother at the bedside, RN and Jennifer Wallace, Nephrology, NP      Antibiotics:  Vancomycin day 4     Subjective:  Patient still having some nausea  Going to try liquids for lunch  IV site blew, for new attempt shortly  No fever or chills in the past 48 hours    ROS: Patient has no fever, chills, sweats overnight; no vomiting, diarrhea; no cough, shortness of breath; no abdomen pain at present   No new symptoms  Objective:  Vitals:  Temp:  [98 3 °F (36 8 °C)-98 8 °F (37 1 °C)] 98 6 °F (37 °C)  HR:  [] 100  Resp:  [18] 18  BP: (180-220)/() 200/100  SpO2:  [95 %-97 %] 96 %  Temp (24hrs), Av 6 °F (37 °C), Min:98 3 °F (36 8 °C), Max:98 8 °F (37 1 °C)  Current: Temperature: 98 6 °F (37 °C)    General Appearance:  Awake, alert, cooperative, resting in chair, no acute distress  Throat: Oropharynx moist without lesions  Lungs:   Clear to auscultation bilaterally; no wheezes, rhonchi or rales; respirations unlabored   Heart:  RRR; S1-S2 heard, no murmur   Abdomen:   Soft, no focal tenderness on palpation, non-distended, positive bowel sounds, PD catheter capped and site nontender   Extremities: No edema, right arm IV site nontender   Skin: No rashes      Labs, Imaging, & Other studies:   All pertinent labs and imaging studies were personally reviewed  Results from last 7 days   Lab Units 20  0528 20  0609 20  0308   WBC Thousand/uL 5 96 6 15 12 99*   HEMOGLOBIN g/dL 8 1* 7 3* 7 8*   PLATELETS Thousands/uL 261 225 244  241     Results from last 7 days   Lab Units 20  0528  20  0308 20  1901 20  1454   POTASSIUM mmol/L 4 1   < > 5 1 5 3 5 6*   CHLORIDE mmol/L 99*   < > 101 99* 98*   CO2 mmol/L 30   < > 28 31 31   BUN mg/dL 39*   < > 37* 36* 37*   CREATININE mg/dL 12 96*   < > 11 88* 11 89* 12 41*   EGFR ml/min/1 73sq m 4   < > 5 5 5   CALCIUM mg/dL 9 1   < > 9 0 8 6 9 2   AST U/L  --   --  15 16 17   ALT U/L  --   --  15 22 26   ALK PHOS U/L  --   --  65 66 68    < > = values in this interval not displayed  Results from last 7 days   Lab Units 20  0308 20  1901   PROCALCITONIN ng/ml 0 44* 0 27*     Results from last 7 days   Lab Units 20  1127 20  2346 20  1913 20  1902 20  1647 20  1501 20  1454   BLOOD CULTURE   --   --   --  No Growth at 48 hrs  No Growth at 48 hrs    --  No Growth at 72 hrs  No Growth at 72 hrs     GRAM STAIN RESULT  2+ Polys  No bacteria seen  --  3+ Polys  No bacteria seen  --   --   --   --   --    BODY FLUID CULTURE, STERILE  No growth  --  Growth in Broth culture only Enterococcus species*  --   --   --   --   --    MRSA CULTURE ONLY   --  No Methicillin Resistant Staphlyococcus aureus (MRSA) isolated  --   --   --   --   --   --    C DIFF TOXIN B   --   --   --   --   --  Negative  --   --

## 2020-08-03 NOTE — PROGRESS NOTES
Progress Note - General Surgery   Coral Lorraine Mckeon 37 y o  male MRN: [id number]  Unit/Bed#: S -01 Encounter: [id number]    Assessment:  37y o  year old male with T1DM, HTN, CVA on ASA, ESRD on PD is admitted to the hospital for likely bacterial peritonitis from his PD catheter      Plan:  Follow up PD catheter fluid cultures  Defer to nephrology for if/when to remove PD catheter  Alternative to PD such as permacath/HD per nephrology  Care per primary team    Subjective/Objective     Subjective: Some worsening abdominal pain overnight, not severe      Objective:     Vitals: Temp:  [98 3 °F (36 8 °C)-98 8 °F (37 1 °C)] 98 8 °F (37 1 °C)  HR:  [83-99] 83  Resp:  [18] 18  BP: (180-220)/(60-98) 185/60  Body mass index is 33 46 kg/m²  I/O       08/01 0701 - 08/02 0700 08/02 0701 - 08/03 0700    P  O   480    Total Intake(mL/kg)  480 (4 5)    Urine (mL/kg/hr) 450 (0 2)     Other 0     Total Output 450     Net -450 +480                Physical Exam:   GEN: NAD  HEENT: MMM  CV: RRR  Lung: Normal effort  Ab: Soft, NT/ND  No erythema/induration or other signs of local skin/soft tissue infection at PD catheter site  Extrem: No CCE  Neuro: A+Ox3    Lab, Imaging and other studies: I have personally reviewed pertinent reports    , CBC with diff:   Lab Results   Component Value Date    WBC 6 15 08/02/2020    HGB 7 3 (L) 08/02/2020    HCT 22 2 (L) 08/02/2020    MCV 99 (H) 08/02/2020     08/02/2020    MCH 32 4 08/02/2020    MCHC 32 9 08/02/2020    RDW 15 0 08/02/2020    MPV 10 3 08/02/2020   , BMP/CMP:   Lab Results   Component Value Date    SODIUM 138 08/02/2020    K 5 1 08/02/2020     08/02/2020    CO2 28 08/02/2020    BUN 41 (H) 08/02/2020    CREATININE 12 62 (H) 08/02/2020    CALCIUM 8 6 08/02/2020    EGFR 4 08/02/2020     VTE Pharmacologic Prophylaxis: Heparin  VTE Mechanical Prophylaxis: sequential compression device    No new Assessment & Plan notes have been filed under this hospital service since the last note was generated    Service: Kenneth Valerio MD  8/2/2020 11:57 PM

## 2020-08-03 NOTE — ASSESSMENT & PLAN NOTE
· Patient with peritoneal dialysis catheter in place  · Creatinine baseline from 12-14     · Nephrology onboard  · Per nephro and ID catheter is to remain in place and pt treated with abx

## 2020-08-03 NOTE — PROGRESS NOTES
[address] [facility] NOTE   Daniel Mckeon 37 y o  male MRN: [id number]  Unit/Bed#: S -01 Encounter: [id number]  Reason for Consult:  End-stage renal disease on PD    ASSESSMENT and PLAN:  57-year-old male with a history of hypertension, CVA, seizure, homozygous for C677T MTHFR mutation and heterozygote for prothombin gene mutation, type 1 diabetes mellitus, end-stage renal disease on PD, recent peritonitis with Staph epi June 2020 who presents with abdominal pain  1  End-stage renal disease on peritoneal dialysis:  · Managed at [facility]  · Target weight 101 kg  · PD prescription CCPD:  Total fills 5 with 4 fills at 2200 mL and last fill 1500 mL  Various percentages of dextrose at night  1 5% day dwell which he drains at 1600 hours and refills with 2 5%  Low average transporter  · Currently on CAPD:  1 5% 2 L exchange  · Fibrin noted in PD fluid -on heparin  · Plan:   · Switch to CCPD  · Drain at 1600 hours, refill with 2 5% 1500 mL at 1600 hours and placed on cycler tonight at 2200 hours  2  Abdominal pain:  · PD fluid:  ·  Initial Gram 7/31 stain Enterococcus  Repeat culture 8/1 shows no growth  · WBC 7000 on admission-->4600-->527, results from today pending  · Last neutrophil count 6% on 08/02   71% on admission  · Peritonitis vs gut translocation leading to peritonitis  · CT scan thickened proximal sigmoid colon  · Recent peritonitis with Staph epi June 2020  LAST DOSE OF VANCOMYCIN JULY 2, 2020  Surveillance cultures on July 23rd negative/no growth  · Antibiotics:  Currently on vancomycin IV  Last dose of Flagyl 8/2  · VANCOMYCIN LEVEL 27 8-->19--> 27 7 today  · Plan:  · Not the same bacteria cultured with repeat culture showing no growth  Will treat and monitor  · No plan to remove PD catheter at this time  · Vancomycin intraperitoneal per ID  · Discussed with ID  3  Hypertension:    · On doxazosin, hydralazine, labetalol, lisinopril, nifedipine    Also on metolazone at home which is on hold  Clonidine use p r n   4  Anemia:    · Hemoglobin 8 1  · Epogen dose 64946 units every other week  · Last dose of Epogen on 07/23/2020  Next dose due on August 6th which we will provide if he remains hospitalized  · Goal hemoglobin 9-10 due to history of seizure/CVA  · On transplant list-if transfusion needed use leuko reduced, CMV negative blood  5  CKD MBD:  On PhosLo/lanthum, vitamin-D, Sensipar  6  Diabetes mellitus type 1:  Management per primary team  7  Vomiting:  Ongoing issues    SUBJECTIVE / INTERVAL HISTORY:  Less vomiting, feel better  Concern about blood pressure elevation but received his medications late  Severe vomiting earlier  OBJECTIVE:  Current Weight: Weight - Scale: 106 kg (233 lb 3 2 oz)  Vitals:    08/03/20 0700 08/03/20 0730 08/03/20 0833 08/03/20 1111   BP:  (!) 195/90 (!) 200/100 (!) 200/100   Pulse: 100      Resp: 18      Temp: 98 6 °F (37 °C)      TempSrc: Oral      SpO2: 96%      Weight:       Height:           Intake/Output Summary (Last 24 hours) at 8/3/2020 1126  Last data filed at 8/3/2020 0846  Gross per 24 hour   Intake 480 ml   Output 600 ml   Net -120 ml     General: NAD  Skin: no rash  Eyes: anicteric sclera  ENT: moist mucous membrane  Neck: supple  Chest: CTA b/l, no ronchii, no wheeze, no rubs, no rales  Normal effort  CVS: s1s2, no murmur, no gallop, no rub  Abdomen: soft, nontender, nl sounds  Extremities:  0 to trace edema LE b/l  : no [place]  Neuro: AAOX3  Psych: normal affect    Pleasant and cooperative  Medications:    Current Facility-Administered Medications:     acetaminophen (TYLENOL) tablet 650 mg, 650 mg, Oral, Q6H PRN, Sudarshan Woamck PA-C, 650 mg at 08/02/20 1828    aspirin chewable tablet 81 mg, 81 mg, Oral, Daily, Sudarshan Womack PA-C, 81 mg at 08/01/20 0815    atorvastatin (LIPITOR) tablet 40 mg, 40 mg, Oral, HS, Sudarshan Womack PA-C, 40 mg at 08/02/20 2148    calcium acetate (PHOSLO) capsule 667 mg, 667 mg, Oral, TID With Meals, Marco Antonio Coronado PA-C, Stopped at 08/01/20 1702    cholecalciferol (VITAMIN D3) tablet 2,000 Units, 2,000 Units, Oral, Daily, Sudarshan Womack PA-C, 2,000 Units at 08/01/20 2063    cinacalcet (SENSIPAR) tablet 60 mg, 60 mg, Oral, Every Other Day, Sudarshan Womack PA-C, 60 mg at 08/01/20 1560    cloNIDine (CATAPRES) tablet 0 1 mg, 0 1 mg, Oral, BID PRN, Dhaval Nascimento MD    divalproex sodium (DEPAKOTE ER) 24 hr tablet 500 mg, 500 mg, Oral, Daily, Sudarshan Womack PA-C, 500 mg at 08/03/20 0500    doxazosin (CARDURA) tablet 2 mg, 2 mg, Oral, HS, Sudarshan Womack PA-C, 2 mg at 08/02/20 2146    escitalopram (LEXAPRO) tablet 10 mg, 10 mg, Oral, Daily, Sudarshan Womack PA-C, 10 mg at 08/01/20 5823    famotidine (PEPCID) tablet 10 mg, 10 mg, Oral, Daily, Sudarshan Womack PA-C, 10 mg at 08/01/20 0816    gabapentin (NEURONTIN) capsule 100 mg, 100 mg, Oral, HS, Sudarshan Womack PA-C, 100 mg at 08/02/20 2148    gentamicin (GARAMYCIN) 0 1 % ointment, , Topical, Daily, Artem Obregon MD, Stopped at 08/01/20 8728    heparin (porcine) subcutaneous injection 5,000 Units, 5,000 Units, Subcutaneous, Q8H [facility], 5,000 Units at 08/03/20 0521 **AND** [COMPLETED] Platelet count, , , Once, Sudarshan Womack PA-C    hydrALAZINE (APRESOLINE) injection 10 mg, 10 mg, Intravenous, Q4H PRN, Dhaval Nascimento MD, 10 mg at 08/03/20 1111    hydrALAZINE (APRESOLINE) tablet 50 mg, 50 mg, Oral, Q8H [facility], Artem Obregon MD, 50 mg at 08/03/20 0521    insulin glargine (LANTUS) subcutaneous injection 5 Units 0 05 mL, 5 Units, Subcutaneous, After Breakfast, Sudarshan Womack PA-C, 5 Units at 08/02/20 0827    insulin lispro (HumaLOG) 100 units/mL subcutaneous injection 1-5 Units, 1-5 Units, Subcutaneous, HS, Sudarshan Womack PA-C, 2 Units at 08/02/20 2140    insulin lispro (HumaLOG) 100 units/mL subcutaneous injection 1-6 Units, 1-6 Units, Subcutaneous, TID AC **AND** Fingerstick Glucose (POCT), , , TID AC, Sudarshan Womack PA-C    insulin lispro (HumaLOG) 100 units/mL subcutaneous injection 2 Units, 2 Units, Subcutaneous, Daily With Lunch, Sudarshan Womack PA-C    insulin lispro (HumaLOG) 100 units/mL subcutaneous injection 4 Units, 4 Units, Subcutaneous, Daily With Breakfast, Sudarshan Womack PA-C, Stopped at 08/01/20 0819    insulin lispro (HumaLOG) 100 units/mL subcutaneous injection 4 Units, 4 Units, Subcutaneous, Daily With Dwana WinterSAV rodriguez, Stopped at 08/02/20 1717    insulin NPH (HumuLIN N,NovoLIN N) 100 Units/mL subcutaneous injection 5 Units, 5 Units, Subcutaneous, HS, Sudarshan Womack PA-C, 5 Units at 08/02/20 2141    labetalol (NORMODYNE) tablet 300 mg, 300 mg, Oral, Q12H [address], Sudarshan Womack PA-C, 300 mg at 08/03/20 0088    lisinopril (ZESTRIL) tablet 40 mg, 40 mg, Oral, Daily, Sudarshan Womack PA-C, 40 mg at 08/01/20 1820    NIFEdipine (PROCARDIA XL) 24 hr tablet 60 mg, 60 mg, Oral, BID, Sudarshan Womack PA-C, 60 mg at 08/03/20 0470    ondansetron (ZOFRAN) 8 mg in sodium chloride 0 9 % 50 mL IVPB, 8 mg, Intravenous, Q6H PRN, Digna Banerjee MD    vancomycin (VANCOCIN) 1,250 mg in sodium chloride 0 9 % 250 mL IVPB, 15 mg/kg (Adjusted), Intravenous, Daily PRN, Wesley Melo MD    Laboratory Results:  Results from last 7 days   Lab Units 08/03/20  0528 08/02/20  0609 08/01/20  0308 07/31/20  1901 07/30/20  1454   WBC Thousand/uL 5 96 6 15 12 99* 11 04* 6 68   HEMOGLOBIN g/dL 8 1* 7 3* 7 8* 7 7* 8 3*   HEMATOCRIT % 25 1* 22 2* 24 4* 23 9* 25 5*   PLATELETS Thousands/uL 261 225 244  241 265 320   POTASSIUM mmol/L 4 1 5 1 5 1 5 3 5 6*   CHLORIDE mmol/L 99* 101 101 99* 98*   CO2 mmol/L 30 28 28 31 31   BUN mg/dL 39* 41* 37* 36* 37*   CREATININE mg/dL 12 96* 12 62* 11 88* 11 89* 12 41*   CALCIUM mg/dL 9 1 8 6 9 0 8 6 9 2   MAGNESIUM mg/dL  --   --  1 9  --   --    PHOSPHORUS mg/dL  --  7 3* 5 9*  --   --

## 2020-08-03 NOTE — ASSESSMENT & PLAN NOTE
Lab Results   Component Value Date    HGBA1C 6 8 (H) 06/21/2020       Recent Labs     08/03/20  0724 08/03/20  1103 08/03/20  1350 08/03/20  1549   POCGLU 197* 319* 129 86       Blood Sugar Average: Last 72 hrs:  (P) 051 [id number]   · 30 year hx of type 1 diabetes with history of inconsistent sugars  Diagnosed at 1years old  · 1 dose of metoclopramide was given 07/31/2020  · Continue home medications including Lantus 5 units after breakfast   · Continue Humalog 4 units with breakfast, 2 units with lunch, 4 units with dinner  · Continue Novolin 4 units HS  · Sliding scale insulin  · Accu-Cheks aashish i d Dara Soulier

## 2020-08-03 NOTE — PROGRESS NOTES
Progress Note Nikos Needs 1985, 37 y o  male MRN: [id number]    Unit/Bed#: S -01 Encounter: [id number]    Primary Care Provider: Janes Becker DO   Date and time admitted to hospital: 7/31/2020  5:07 PM        Peritoneal dialysis catheter in place [facility])  Assessment & Plan  · Patient with peritoneal dialysis catheter in place  · Creatinine baseline from 12-14  · Nephrology onboard  · Per nephro and ID catheter is to remain in place and pt treated with abx    Anemia  Assessment & Plan  Patient has chronic anemia  Hgb on AM labs was 8 1  Consider transfusing if hemoglobin less than 7, however given that the patient is on the transplant list at [facility], the transfusion of blood could prevent him from getting transplanted  Discuss with patient and family before and transfuse patient if critically needed, and consider repeating Hgb/Hct before administartion  Consent obtained    History of lacunar cerebrovascular accident (CVA)  Assessment & Plan  · Continue ASA, and statin  · Continue BP meds with the addition of Clonidine    Type 1 diabetes mellitus with diabetic gastropathy [facility])  Assessment & Plan  Lab Results   Component Value Date    HGBA1C 6 8 (H) 06/21/2020       Recent Labs     08/03/20  0724 08/03/20  1103 08/03/20  1350 08/03/20  1549   POCGLU 197* 319* 129 86       Blood Sugar Average: Last 72 hrs:  (P) 937 [id number]   · 30 year hx of type 1 diabetes with history of inconsistent sugars  Diagnosed at 1years old  · 1 dose of metoclopramide was given 07/31/2020  · Continue home medications including Lantus 5 units after breakfast   · Continue Humalog 4 units with breakfast, 2 units with lunch, 4 units with dinner  · Continue Novolin 4 units HS  · Sliding scale insulin  · Accu-Cheks q i d  Hypertensive urgency  Assessment & Plan  · Patient with hypertensive urgency with BP max of 222/93 in the emergency department    · Currently managed with Doxazosin 2mg, Hydralazine 50mg BID, Labetalol 300mg Q12H, Lisinopril 40mg daily, Nifedipine 60mg BID and Torsemide 100mg BID  · Pt refused AM BP meds due to nausea, IV hydralazine given, nurse will reassess may consider other IV medications if still elevated   · Clonidine added PRN  · Hydralazine increased to TID  · Continue to Monitor BP  · Pt patient is chronically hypertensive    Vitals:    08/03/20 1111 08/03/20 1231 08/03/20 1345 08/03/20 1446   BP: (!) 200/100  170/70 149/71   BP Location:    Right arm   Pulse:    91   Resp:    18   Temp:    98 9 °F (37 2 °C)   TempSrc:    Oral   SpO2:    94%   Weight:  102 kg (224 lb 6 4 oz)     Height:             * Peritonitis (HCC)  Assessment & Plan  · Presents with abdominal pain and rigors  Patient has been admitted for peritonitis in the past and was recently discharged dignosed with staph epidermis peritonitis and sent with a course of intraperitoneal Vancomycin for 14 days  · CT scan showed Wall thickening of the proximal sigmoid colon and distal descending colon could relate to underdistention or colitis  Moderate amount of abdominal and pelvic ascites with left-sided catheter terminating in the medial pelvis  C diff negative on 7/30   · Body cultures preliminary results from 1 culture showed enterococci  · Follow body cultures   · Consulted ID and nephrology   · Follow WBC and VS - no leukocytosis noted on labs today  · Follow fecal calprotectin  · Awaiting results for stool enteric pathogens    · Per nephro and ID catheter is to remain in place and pt treated with abx        VTE Pharmacologic Prophylaxis:   Pharmacologic: Heparin  Mechanical VTE Prophylaxis in Place: No    Discussions with Specialists or Other Care Team Provider: Nephrology and ID    Education and Discussions with Family / Patient: Med rec    Current Length of Stay: 3 day(s)    Current Patient Status: Inpatient     Discharge Plan / Estimated Discharge Date: Continue inpatient treatment    Code Status: Level 1 - Full Code      Subjective:   Patient seen and examined  No acute events overnight  Objective:     Vitals:   Temp (24hrs), Av 8 °F (37 1 °C), Min:98 6 °F (37 °C), Max:98 9 °F (37 2 °C)    Temp:  [98 6 °F (37 °C)-98 9 °F (37 2 °C)] 98 9 °F (37 2 °C)  HR:  [] 91  Resp:  [18] 18  BP: (149-220)/() 149/71  SpO2:  [94 %-96 %] 94 %  Body mass index is 32 2 kg/m²  Input and Output Summary (last 24 hours): Intake/Output Summary (Last 24 hours) at 8/3/2020 1641  Last data filed at 8/3/2020 0846  Gross per 24 hour   Intake 480 ml   Output 600 ml   Net -120 ml       Physical Exam:     Physical Exam   Constitutional: He is oriented to person, place, and time  He appears well-developed and well-nourished  No distress  HENT:   Head: Normocephalic and atraumatic  Nose: Nose normal    Eyes: Pupils are equal, round, and reactive to light  Conjunctivae and EOM are normal    Cardiovascular: Normal rate and regular rhythm  Exam reveals no gallop and no friction rub  No murmur heard  Pulmonary/Chest: Effort normal and breath sounds normal  No respiratory distress  Abdominal: Bowel sounds are normal  There is abdominal tenderness  Neurological: He is alert and oriented to person, place, and time  Skin: Skin is warm and dry  He is not diaphoretic  Psychiatric: He has a normal mood and affect  His behavior is normal  Judgment and thought content normal    Nursing note and vitals reviewed  Additional Data:     Labs: I have personally reviewed pertinent reports    Results from last 7 days   Lab Units 20  0609 20  0308 20  1901   WBC Thousand/uL 5 96 6 15 12 99* 11 04*   HEMOGLOBIN g/dL 8 1* 7 3* 7 8* 7 7*   HEMATOCRIT % 25 1* 22 2* 24 4* 23 9*   PLATELETS Thousands/uL 261 225 244  241 265   NEUTROS PCT % 62  --  82* 83*   MONOS PCT % 8  --  5 4      Results from last 7 days   Lab Units 20  0528 20  0609 20  0308 20  1901 20  1453 POTASSIUM mmol/L 4 1 5 1 5 1 5 3 5 6*   CHLORIDE mmol/L 99* 101 101 99* 98*   CO2 mmol/L 30 28 28 31 31   BUN mg/dL 39* 41* 37* 36* 37*   CREATININE mg/dL 12 96* 12 62* 11 88* 11 89* 12 41*   CALCIUM mg/dL 9 1 8 6 9 0 8 6 9 2   ALK PHOS U/L  --   --  65 66 68   ALT U/L  --   --  15 22 26   AST U/L  --   --  15 16 17     Results from last 7 days   Lab Units 08/01/20  0308   MAGNESIUM mg/dL 1 9     Results from last 7 days   Lab Units 08/02/20  0609 08/01/20  0308   PHOSPHORUS mg/dL 7 3* 5 9*          Results from last 7 days   Lab Units 07/31/20  1901   LACTIC ACID mmol/L 1 1           * Additional Pertinent Lab Tests Reviewed: Ellen 66 Admission Reviewed    Radiology Results: I have personally reviewed pertinent reports  Ct Abdomen Pelvis Wo Contrast    Result Date: 7/31/2020  Impression: Wall thickening of the proximal sigmoid colon and distal descending colon could relate to underdistention or colitis  Moderate amount of abdominal and pelvic ascites with left-sided catheter terminating in the medial pelvis  Left lower lobe pulmonary nodules measuring up to 9 mm  This is minimally increased in size compared to the prior exams dating back to 2018  Follow-up recommended in 12 months  Trace pericardial effusion  Small hiatal hernia  Diffuse bladder wall thickening could relate to underdistention or cystitis, correlate with urinalysis  Workstation performed: [id number]       Recent Cultures (last 7 days):     Results from last 7 days   Lab Units 08/01/20  1127 07/31/20 2000 07/31/20  1913 07/31/20  1902 07/30/20  1647 07/30/20  1501 07/30/20  1454   BLOOD CULTURE   --   --  No Growth at 48 hrs  No Growth at 48 hrs  --  No Growth at 72 hrs  No Growth at 72 hrs     GRAM STAIN RESULT  2+ Polys  No bacteria seen 3+ Polys  No bacteria seen  --   --   --   --   --    BODY FLUID CULTURE, STERILE  No growth Growth in Broth culture only Enterococcus faecalis*  --   --   --   --   --    C DIFF TOXIN B --   --   --   --  Negative  --   --        Last 24 Hours Medication List:   acetaminophen, 650 mg, Oral, Q6H PRN, Sudarshan Womack PA-C  aspirin, 81 mg, Oral, Daily, Sudarshan Womack PA-C  atorvastatin, 40 mg, Oral, HS, Sudarshan Womack PA-C  calcium acetate, 667 mg, Oral, TID With Meals, Sudarshan Womack PA-C  cholecalciferol, 2,000 Units, Oral, Daily, Sudarshan Womack PA-C  cinacalcet, 60 mg, Oral, Every Other Day, Sudarshan Womack PA-C  cloNIDine, 0 1 mg, Oral, BID PRN, Jazmin Lopez MD  divalproex sodium, 500 mg, Oral, Daily, Sudarshan Womack PA-C  doxazosin, 2 mg, Oral, HS, Sudarshan Womack PA-C  escitalopram, 10 mg, Oral, Daily, Sudarshan Womack PA-C  famotidine, 10 mg, Oral, Daily, Sudarshan Womack PA-C  gabapentin, 100 mg, Oral, HS, Sudarshan Womack PA-C  gentamicin, , Topical, Daily, Kandy Rosa MD  heparin (porcine), 5,000 Units, Subcutaneous, Q8H [address], Sudarshan Womack PA-C  hydrALAZINE, 10 mg, Intravenous, Q4H PRN, Jazmin Lopez MD  hydrALAZINE, 50 mg, Oral, Q8H HALIE, Madhav Pillai,   insulin glargine, 5 Units, Subcutaneous, After Breakfast, Sudarshan Womack PA-C  insulin lispro, 1-5 Units, Subcutaneous, HS, Sudarshan Womack PA-C  insulin lispro, 1-6 Units, Subcutaneous, TID AC, Sudarshan Womack PA-C  insulin lispro, 2 Units, Subcutaneous, Daily With Lunch, Sudarshan Womack PA-C  insulin lispro, 4 Units, Subcutaneous, Daily With Breakfast, Sudarshan Womack PA-C  insulin lispro, 4 Units, Subcutaneous, Daily With Dinner, Sudarshan Womack PA-C  insulin NPH, 5 Units, Subcutaneous, HS, Sudarshan Womack PA-C  labetalol, 300 mg, Oral, Q12H [address], Sudarshan Womack PA-C  lisinopril, 40 mg, Oral, Daily, Sudarshan Womack PA-C  NIFEdipine ER, 60 mg, Oral, BID, Sudarshan Womack PA-C  ondansetron, 8 mg, Intravenous, Q6H PRN, Jazmin Lopez MD  vancomycin, 15 mg/kg (Adjusted), Intravenous, Daily PRN, Debora Jang MD         Today, Patient Was Seen By: Anju Palomo, [address] [facility]  Internal Medicine Residency PGY-1    Portions of the record may have been created with voice recognition software  Occasional wrong word or "sound a like" substitutions may have occurred due to the inherent limitations of voice recognition software  Read the chart carefully and recognize, using context, where substitutions have occurred

## 2020-08-03 NOTE — ASSESSMENT & PLAN NOTE
Patient has chronic anemia  Hgb on AM labs was 8 1  Consider transfusing if hemoglobin less than 7, however given that the patient is on the transplant list at [facility], the transfusion of blood could prevent him from getting transplanted  Discuss with patient and family before and transfuse patient if critically needed, and consider repeating Hgb/Hct before administartion    Consent obtained

## 2020-08-03 NOTE — ASSESSMENT & PLAN NOTE
· Presents with abdominal pain and rigors  Patient has been admitted for peritonitis in the past and was recently discharged dignosed with staph epidermis peritonitis and sent with a course of intraperitoneal Vancomycin for 14 days  · CT scan showed Wall thickening of the proximal sigmoid colon and distal descending colon could relate to underdistention or colitis  Moderate amount of abdominal and pelvic ascites with left-sided catheter terminating in the medial pelvis  C diff negative on 7/30   · Body cultures preliminary results from 1 culture showed enterococci  · Follow body cultures   · Consulted ID and nephrology   · Follow WBC and VS - no leukocytosis noted on labs today  · Follow fecal calprotectin  · Awaiting results for stool enteric pathogens    · Per nephro and ID catheter is to remain in place and pt treated with abx

## 2020-08-03 NOTE — ASSESSMENT & PLAN NOTE
· Patient with hypertensive urgency with BP max of 222/93 in the emergency department    · Currently managed with Doxazosin 2mg, Hydralazine 50mg BID, Labetalol 300mg Q12H, Lisinopril 40mg daily, Nifedipine 60mg BID and Torsemide 100mg BID  · Pt refused AM BP meds due to nausea, IV hydralazine given, nurse will reassess may consider other IV medications if still elevated   · Clonidine added PRN  · Hydralazine increased to TID  · Continue to Monitor BP  · Pt patient is chronically hypertensive    Vitals:    08/03/20 1111 08/03/20 1231 08/03/20 1345 08/03/20 1446   BP: (!) 200/100  170/70 149/71   BP Location:    Right arm   Pulse:    91   Resp:    18   Temp:    98 9 °F (37 2 °C)   TempSrc:    Oral   SpO2:    94%   Weight:  102 kg (224 lb 6 4 oz)     Height:

## 2020-08-03 NOTE — TELEPHONE ENCOUNTER
Mom called to request script for depakote er 250mg tablets  This was not received by pharmacy  Appears script is listed as "NO PRINT"  Script called in to pharmacy and left on voicemail

## 2020-08-03 NOTE — PROGRESS NOTES
Pt's BP at 21:15 was 220/88  Administered scheduled: doxazosin (CARDURA) tablet 2 mg, hydrALAZINE (APRESOLINE) tablet 50 mg, labetalol (NORMODYNE) tablet 300 mg  SLIM on-call notified  Will continue to monitor

## 2020-08-03 NOTE — PROGRESS NOTES
Vancomycin IV Pharmacy-to-Dose Consultation    Nissa Heaton is a 37 y o  male who is currently receiving Vancomycin IV with management by the Pharmacy Consult service  Assessment/Plan:  The patient was reviewed  Random level this morning was >20  Patient will not get a Vancomycin dose today  Next random level scheduled for 08/04 @ 0600  Re dose when level is less than 20  We will continue to follow the patients culture results and clinical progress daily      Marielena Garzon, Pharmacist

## 2020-08-03 NOTE — PLAN OF CARE
Problem: Potential for Falls  Goal: Patient will remain free of falls  Description: INTERVENTIONS:  - Assess patient frequently for physical needs  -  Identify cognitive and physical deficits and behaviors that affect risk of falls    -  [place] fall precautions as indicated by assessment   - Educate patient/family on patient safety including physical limitations  - Instruct patient to call for assistance with activity based on assessment  - Modify environment to reduce risk of injury  - Consider OT/PT consult to assist with strengthening/mobility  Outcome: Progressing     Problem: PAIN - ADULT  Goal: Verbalizes/displays adequate comfort level or baseline comfort level  Description: Interventions:  - Encourage patient to monitor pain and request assistance  - Assess pain using appropriate pain scale  - Administer analgesics based on type and severity of pain and evaluate response  - Implement non-pharmacological measures as appropriate and evaluate response  - Consider cultural and social influences on pain and pain management  - Notify physician/advanced practitioner if interventions unsuccessful or patient reports new pain  Outcome: Progressing     Problem: INFECTION - ADULT  Goal: Absence or prevention of progression during hospitalization  Description: INTERVENTIONS:  - Assess and monitor for signs and symptoms of infection  - Monitor lab/diagnostic results  - Monitor all insertion sites, i e  indwelling lines, tubes, and drains  - Monitor endotracheal if appropriate and nasal secretions for changes in amount and color  - [place] appropriate cooling/warming therapies per order  - Administer medications as ordered  - Instruct and encourage patient and family to use good hand hygiene technique  - Identify and instruct in appropriate isolation precautions for identified infection/condition  Outcome: Progressing  Goal: Absence of fever/infection during neutropenic period  Description: INTERVENTIONS:  - Monitor WBC    Outcome: Progressing     Problem: SAFETY ADULT  Goal: Maintain or return to baseline ADL function  Description: INTERVENTIONS:  -  Assess patient's ability to carry out ADLs; assess patient's baseline for ADL function and identify physical deficits which impact ability to perform ADLs (bathing, care of mouth/teeth, toileting, grooming, dressing, etc )  - Assess/evaluate cause of self-care deficits   - Assess range of motion  - Assess patient's mobility; develop plan if impaired  - Assess patient's need for assistive devices and provide as appropriate  - Encourage maximum independence but intervene and supervise when necessary  - Involve family in performance of ADLs  - Assess for home care needs following discharge   - Consider OT consult to assist with ADL evaluation and planning for discharge  - Provide patient education as appropriate  Outcome: Progressing  Goal: Maintain or return mobility status to optimal level  Description: INTERVENTIONS:  - Assess patient's baseline mobility status (ambulation, transfers, stairs, etc )    - Identify cognitive and physical deficits and behaviors that affect mobility  - Identify mobility aids required to assist with transfers and/or ambulation (gait belt, sit-to-stand, lift, walker, cane, etc )  - [place] fall precautions as indicated by assessment  - Record patient progress and toleration of activity level on Mobility SBAR; progress patient to next Phase/Stage  - Instruct patient to call for assistance with activity based on assessment  - Consider rehabilitation consult to assist with strengthening/weightbearing, etc   Outcome: Progressing

## 2020-08-03 NOTE — PROGRESS NOTES
Pt's drained PD fluid had large fibrin clots  Nephrology on-call was notified and Scarlet  was added to PD fluids for first exchange with added Heparin to happen around 23:30  Will continue to monitor

## 2020-08-04 LAB
ANION GAP SERPL CALCULATED.3IONS-SCNC: 9 MMOL/L (ref 4–13)
APPEARANCE FLD: CLEAR
BACTERIA BLD CULT: NORMAL
BACTERIA BLD CULT: NORMAL
BACTERIA SPEC BFLD CULT: ABNORMAL
BACTERIA SPEC BFLD CULT: NO GROWTH
BUN SERPL-MCNC: 36 MG/DL (ref 5–25)
CALCIUM SERPL-MCNC: 9.4 MG/DL (ref 8.3–10.1)
CHLORIDE SERPL-SCNC: 99 MMOL/L (ref 100–108)
CO2 SERPL-SCNC: 31 MMOL/L (ref 21–32)
COLOR FLD: COLORLESS
CREAT SERPL-MCNC: 12.96 MG/DL (ref 0.6–1.3)
ERYTHROCYTE [DISTWIDTH] IN BLOOD BY AUTOMATED COUNT: 14.3 % (ref 11.6–15.1)
GFR SERPL CREATININE-BSD FRML MDRD: 4 ML/MIN/1.73SQ M
GLUCOSE SERPL-MCNC: 172 MG/DL (ref 65–140)
GLUCOSE SERPL-MCNC: 194 MG/DL (ref 65–140)
GLUCOSE SERPL-MCNC: 211 MG/DL (ref 65–140)
GLUCOSE SERPL-MCNC: 237 MG/DL (ref 65–140)
GLUCOSE SERPL-MCNC: 247 MG/DL (ref 65–140)
GRAM STN SPEC: ABNORMAL
GRAM STN SPEC: ABNORMAL
GRAM STN SPEC: NORMAL
GRAM STN SPEC: NORMAL
HCT VFR BLD AUTO: 23.8 % (ref 36.5–49.3)
HGB BLD-MCNC: 7.8 G/DL (ref 12–17)
LYMPHOCYTES NFR BLD AUTO: 43 %
MCH RBC QN AUTO: 32 PG (ref 26.8–34.3)
MCHC RBC AUTO-ENTMCNC: 32.8 G/DL (ref 31.4–37.4)
MCV RBC AUTO: 98 FL (ref 82–98)
MONOCYTES NFR BLD AUTO: 55 %
NEUTS SEG NFR BLD AUTO: 2 %
PLATELET # BLD AUTO: 257 THOUSANDS/UL (ref 149–390)
PMV BLD AUTO: 10.5 FL (ref 8.9–12.7)
POTASSIUM SERPL-SCNC: 3.9 MMOL/L (ref 3.5–5.3)
RBC # BLD AUTO: 2.44 MILLION/UL (ref 3.88–5.62)
SITE: NORMAL
SODIUM SERPL-SCNC: 139 MMOL/L (ref 136–145)
TOTAL CELLS COUNTED SPEC: 100
VANCOMYCIN SERPL-MCNC: 24.1 UG/ML
WBC # BLD AUTO: 4.73 THOUSAND/UL (ref 4.31–10.16)
WBC # FLD MANUAL: 127 /UL

## 2020-08-04 PROCEDURE — 80048 BASIC METABOLIC PNL TOTAL CA: CPT | Performed by: PSYCHIATRY & NEUROLOGY

## 2020-08-04 PROCEDURE — 3E1M39Z IRRIGATION OF PERITONEAL CAVITY USING DIALYSATE, PERCUTANEOUS APPROACH: ICD-10-PCS | Performed by: INTERNAL MEDICINE

## 2020-08-04 PROCEDURE — 99233 SBSQ HOSP IP/OBS HIGH 50: CPT | Performed by: INTERNAL MEDICINE

## 2020-08-04 PROCEDURE — 82948 REAGENT STRIP/BLOOD GLUCOSE: CPT

## 2020-08-04 PROCEDURE — 80202 ASSAY OF VANCOMYCIN: CPT | Performed by: INTERNAL MEDICINE

## 2020-08-04 PROCEDURE — 85027 COMPLETE CBC AUTOMATED: CPT | Performed by: PSYCHIATRY & NEUROLOGY

## 2020-08-04 PROCEDURE — 89051 BODY FLUID CELL COUNT: CPT | Performed by: INTERNAL MEDICINE

## 2020-08-04 PROCEDURE — 99232 SBSQ HOSP IP/OBS MODERATE 35: CPT | Performed by: INTERNAL MEDICINE

## 2020-08-04 RX ORDER — METOCLOPRAMIDE HYDROCHLORIDE 5 MG/ML
5 INJECTION INTRAMUSCULAR; INTRAVENOUS
Status: DISCONTINUED | OUTPATIENT
Start: 2020-08-04 | End: 2020-08-05

## 2020-08-04 RX ADMIN — HYDRALAZINE HYDROCHLORIDE 10 MG: 20 INJECTION INTRAMUSCULAR; INTRAVENOUS at 11:46

## 2020-08-04 RX ADMIN — LISINOPRIL 40 MG: 20 TABLET ORAL at 08:27

## 2020-08-04 RX ADMIN — HYDRALAZINE HYDROCHLORIDE 50 MG: 25 TABLET ORAL at 05:25

## 2020-08-04 RX ADMIN — CALCIUM ACETATE 667 MG: 667 CAPSULE ORAL at 11:46

## 2020-08-04 RX ADMIN — HEPARIN SODIUM 5000 UNITS: 5000 INJECTION INTRAVENOUS; SUBCUTANEOUS at 14:38

## 2020-08-04 RX ADMIN — NIFEDIPINE 60 MG: 30 TABLET, EXTENDED RELEASE ORAL at 17:14

## 2020-08-04 RX ADMIN — DIVALPROEX SODIUM 500 MG: 500 TABLET, FILM COATED, EXTENDED RELEASE ORAL at 08:27

## 2020-08-04 RX ADMIN — FAMOTIDINE 10 MG: 20 TABLET, FILM COATED ORAL at 08:27

## 2020-08-04 RX ADMIN — ONDANSETRON 8 MG: 2 INJECTION INTRAMUSCULAR; INTRAVENOUS at 05:30

## 2020-08-04 RX ADMIN — HYDRALAZINE HYDROCHLORIDE 50 MG: 25 TABLET ORAL at 14:38

## 2020-08-04 RX ADMIN — INSULIN LISPRO 1 UNITS: 100 INJECTION, SOLUTION INTRAVENOUS; SUBCUTANEOUS at 22:09

## 2020-08-04 RX ADMIN — ASPIRIN 81 MG 81 MG: 81 TABLET ORAL at 08:28

## 2020-08-04 RX ADMIN — INSULIN LISPRO 2 UNITS: 100 INJECTION, SOLUTION INTRAVENOUS; SUBCUTANEOUS at 17:17

## 2020-08-04 RX ADMIN — GABAPENTIN 100 MG: 100 CAPSULE ORAL at 22:05

## 2020-08-04 RX ADMIN — LABETALOL HYDROCHLORIDE 300 MG: 100 TABLET, FILM COATED ORAL at 08:26

## 2020-08-04 RX ADMIN — CALCIUM ACETATE 667 MG: 667 CAPSULE ORAL at 17:14

## 2020-08-04 RX ADMIN — INSULIN HUMAN 5 UNITS: 100 INJECTION, SUSPENSION SUBCUTANEOUS at 22:22

## 2020-08-04 RX ADMIN — INSULIN LISPRO 4 UNITS: 100 INJECTION, SOLUTION INTRAVENOUS; SUBCUTANEOUS at 17:17

## 2020-08-04 RX ADMIN — CALCIUM ACETATE 667 MG: 667 CAPSULE ORAL at 08:27

## 2020-08-04 RX ADMIN — METOCLOPRAMIDE HYDROCHLORIDE 5 MG: 5 INJECTION INTRAMUSCULAR; INTRAVENOUS at 17:14

## 2020-08-04 RX ADMIN — ATORVASTATIN CALCIUM 40 MG: 40 TABLET, FILM COATED ORAL at 22:06

## 2020-08-04 RX ADMIN — LABETALOL HYDROCHLORIDE 300 MG: 100 TABLET, FILM COATED ORAL at 22:16

## 2020-08-04 RX ADMIN — DOXAZOSIN 2 MG: 1 TABLET ORAL at 22:15

## 2020-08-04 RX ADMIN — ESCITALOPRAM OXALATE 10 MG: 10 TABLET ORAL at 08:27

## 2020-08-04 RX ADMIN — NIFEDIPINE 60 MG: 30 TABLET, EXTENDED RELEASE ORAL at 08:26

## 2020-08-04 RX ADMIN — INSULIN LISPRO 2 UNITS: 100 INJECTION, SOLUTION INTRAVENOUS; SUBCUTANEOUS at 08:29

## 2020-08-04 RX ADMIN — HEPARIN SODIUM: 1000 INJECTION INTRAVENOUS; SUBCUTANEOUS at 22:02

## 2020-08-04 RX ADMIN — GENTAMICIN SULFATE: 1 OINTMENT TOPICAL at 08:29

## 2020-08-04 RX ADMIN — HYDRALAZINE HYDROCHLORIDE 50 MG: 25 TABLET ORAL at 22:16

## 2020-08-04 RX ADMIN — INSULIN GLARGINE 5 UNITS: 100 INJECTION, SOLUTION SUBCUTANEOUS at 08:28

## 2020-08-04 RX ADMIN — METOCLOPRAMIDE HYDROCHLORIDE 5 MG: 5 INJECTION INTRAMUSCULAR; INTRAVENOUS at 11:46

## 2020-08-04 RX ADMIN — HEPARIN SODIUM 5000 UNITS: 5000 INJECTION INTRAVENOUS; SUBCUTANEOUS at 22:05

## 2020-08-04 RX ADMIN — ONDANSETRON 8 MG: 2 INJECTION INTRAMUSCULAR; INTRAVENOUS at 23:00

## 2020-08-04 RX ADMIN — HEPARIN SODIUM 5000 UNITS: 5000 INJECTION INTRAVENOUS; SUBCUTANEOUS at 05:23

## 2020-08-04 RX ADMIN — INSULIN LISPRO 3 UNITS: 100 INJECTION, SOLUTION INTRAVENOUS; SUBCUTANEOUS at 11:53

## 2020-08-04 RX ADMIN — CHOLECALCIFEROL TAB 25 MCG (1000 UNIT) 2000 UNITS: 25 TAB at 08:30

## 2020-08-04 NOTE — PROGRESS NOTES
Vancomycin IV Pharmacy-to-Dose Consultation    Lucinda Bai is a 37 y o  male who is currently receiving Vancomycin IV with management by the Pharmacy Consult service  Assessment/Plan:  The patient was reviewed  Random level this morning was >20  Patient will not get a Vancomycin dose today  Next random level scheduled for 08/5 @ 0600  Re dose when level is less than 20  We will continue to follow the patients culture results and clinical progress daily  We will continue to follow the patients culture results and clinical progress daily      Opla Uribe, Pharmacist

## 2020-08-04 NOTE — PROGRESS NOTES
[address] [address] [facility] NOTE   Lorena Mckeon 37 y o  male MRN: [id number]  Unit/Bed#: S -01 Encounter: [id number]  Reason for Consult:  ESRD on PD    ASSESSMENT and PLAN:  58-year-old male with a history of hypertension, CVA, seizure, homozygous for C677T MTHFR mutation and heterozygote for prothombin gene mutation, type 1 diabetes mellitus, end-stage renal disease on PD, recent peritonitis with Staph epi June 2020 who presents with abdominal pain  1  End-stage renal disease on peritoneal dialysis:  · Managed at [facility]  · Target weight 101 kg  · PD prescription CCPD:  Total fills 5 with 4 fills at 2200 mL and last fill 1500 mL  Various percentages of dextrose at night  1 5% day dwell which he drains at 1600 hours and refills with 2 5%  Low average transporter  · Currently on CAPD:  1 5% 2 L exchange  · No fibrin noted in PD fluid today  · Ultrafiltration approximately 8-900 mL  · Plan:   ? Switch to CCPD  ? No change in prescription  2  Abdominal pain:  · PD fluid:  ?  Initial Gram 7/31 stain Enterococcus  Repeat culture 8/1 shows no growth  ? WBC 7000 on admission-->4600-->527-->203  ? Last neutrophil count 4% on 08/03   71% on admission  · External contamination vs gut translocation leading to peritonitis  · CT scan thickened proximal sigmoid colon  · Recent peritonitis with Staph epi June 2020  LAST DOSE OF VANCOMYCIN JULY 2, 2020  Surveillance cultures on July 23rd negative/no growth  · Antibiotics:  Currently on vancomycin IV  Last dose of Flagyl 8/2  ? VANCOMYCIN LEVEL 27 8-->19--> 27 7-->24 1   · Plan:  ? Not the same bacteria cultured with repeat culture showing no growth  Will treat and monitor  ? No plan to remove PD catheter at this time  ? Antibiotics per ID  3  Hypertension:    · On doxazosin, hydralazine, labetalol, lisinopril, nifedipine  Also on metolazone at home which is on hold  Clonidine use p r n  · Blood pressure remains elevated  4   Anemia: · Hemoglobin 8 1-->7 8  · Epogen dose 36085 units every other week  ? Last dose of Epogen on 07/23/2020  Next dose due on August 6th which we will provide if he remains hospitalized  ? Goal hemoglobin 9-10 due to history of seizure/CVA  · On transplant list-if transfusion needed use leuko reduced, CMV negative blood  5  CKD MBD:  On PhosLo/lanthum, vitamin-D, Sensipar  6  Diabetes mellitus type 1:  Management per primary team  7  Vomiting:  Ongoing issues    DISPOSITION:  No change in PD prescription  Antibiotics per ID    SUBJECTIVE / INTERVAL HISTORY:  Had vomiting once again last night  No vomiting but intermittent nausea  Getting Reglan    Spoke with patient and father regarding plan of care    OBJECTIVE:  Current Weight: Weight - Scale: 102 kg (224 lb 6 4 oz)  Vitals:    08/04/20 0519 08/04/20 0613 08/04/20 0728 08/04/20 0943   BP: (!) 196/80 (!) 190/74 (!) 218/90 (!) 190/70   BP Location: Right arm Right arm Right arm    Pulse: 98  100    Resp:   18    Temp:   98 6 °F (37 °C)    TempSrc:   Oral    SpO2:   98%    Weight:       Height:           Intake/Output Summary (Last 24 hours) at 8/4/2020 1110  Last data filed at 8/4/2020 0600  Gross per 24 hour   Intake    Output 1 ml   Net -1 ml     General: NAD, chronically ill up  Skin: no rash  Eyes: anicteric sclera  ENT: moist mucous membrane  Neck: supple  Chest: CTA b/l, no ronchii, no wheeze, no rubs, no rales  CVS: s1s2, no murmur, no gallop, no rub  Abdomen: soft, nontender, nl sounds  Extremities: no edema LE b/l  : no preston  Neuro: AAOX3  Psych: normal affect  Medications:    Current Facility-Administered Medications:     acetaminophen (TYLENOL) tablet 650 mg, 650 mg, Oral, Q6H PRN, Sudarshan Womack PA-C, 650 mg at 08/02/20 1828    aspirin chewable tablet 81 mg, 81 mg, Oral, Daily, Sudarshan Womack PA-C, 81 mg at 08/04/20 2410    atorvastatin (LIPITOR) tablet 40 mg, 40 mg, Oral, HS, Sudarshan Womack PA-C, 40 mg at 08/02/20 8316    calcium acetate (PHOSLO) capsule 667 mg, 667 mg, Oral, TID With Meals, [facility], 667 mg at 08/04/20 0827    cholecalciferol (VITAMIN D3) tablet 2,000 Units, 2,000 Units, Oral, Daily, Sudarshan Womack PA-C, 2,000 Units at 08/04/20 0830    cinacalcet (SENSIPAR) tablet 60 mg, 60 mg, Oral, Every Other Day, Sudarshan Womack PA-C, 60 mg at 08/01/20 1395    cloNIDine (CATAPRES) tablet 0 1 mg, 0 1 mg, Oral, BID PRN, Santo Bamberger, MD    divalproex sodium (DEPAKOTE ER) 24 hr tablet 500 mg, 500 mg, Oral, Daily, Sudarshan Womack PA-C, 500 mg at 08/04/20 0827    doxazosin (CARDURA) tablet 2 mg, 2 mg, Oral, HS, Sudarshan Womack PA-C, 2 mg at 08/02/20 2146    escitalopram (LEXAPRO) tablet 10 mg, 10 mg, Oral, Daily, Sudarshan Womack PA-C, 10 mg at 08/04/20 0827    famotidine (PEPCID) tablet 10 mg, 10 mg, Oral, Daily, Sudarshan Womack PA-C, 10 mg at 08/04/20 0827    gabapentin (NEURONTIN) capsule 100 mg, 100 mg, Oral, HS, Sudarshan Womack PA-C, 100 mg at 08/02/20 2148    gentamicin (GARAMYCIN) 0 1 % ointment, , Topical, Daily, Belkis Vanessa MD    heparin (porcine) subcutaneous injection 5,000 Units, 5,000 Units, Subcutaneous, Q8H [facility], 5,000 Units at 08/04/20 0523 **AND** [COMPLETED] Platelet count, , , Once, Sudarshan Womack PA-C    hydrALAZINE (APRESOLINE) injection 10 mg, 10 mg, Intravenous, Q4H PRN, Santo Bamberger, MD, 10 mg at 08/03/20 2230    hydrALAZINE (APRESOLINE) tablet 50 mg, 50 mg, Oral, Q8H [facility], Madhav Pillai DO, 50 mg at 08/04/20 0525    insulin glargine (LANTUS) subcutaneous injection 5 Units 0 05 mL, 5 Units, Subcutaneous, After Breakfast, Sudarshan Womack PA-C, 5 Units at 08/04/20 0828    insulin lispro (HumaLOG) 100 units/mL subcutaneous injection 1-5 Units, 1-5 Units, Subcutaneous, HS, Sudarshan Womack PA-C, 3 Units at 08/03/20 2220    insulin lispro (HumaLOG) 100 units/mL subcutaneous injection 1-6 Units, 1-6 Units, Subcutaneous, TID AC, 2 Units at 08/04/20 0829 **AND** Fingerstick Glucose (POCT), , , TID AC, Sudarshan Womack PA-C    insulin lispro (HumaLOG) 100 units/mL subcutaneous injection 2 Units, 2 Units, Subcutaneous, Daily With Lunch, Sudarshan Womack PA-C    insulin lispro (HumaLOG) 100 units/mL subcutaneous injection 4 Units, 4 Units, Subcutaneous, Daily With Breakfast, Sudarshan Womack PA-C, Stopped at 08/01/20 0819    insulin lispro (HumaLOG) 100 units/mL subcutaneous injection 4 Units, 4 Units, Subcutaneous, Daily With [employer], SAV, Stopped at 08/02/20 1717    insulin NPH (HumuLIN N,NovoLIN N) 100 Units/mL subcutaneous injection 5 Units, 5 Units, Subcutaneous, HS, Sudarshan Womack PA-C, 5 Units at 08/03/20 2219    labetalol (NORMODYNE) tablet 300 mg, 300 mg, Oral, Q12H [facility], Sudarshan Womack PA-C, 300 mg at 08/04/20 0826    lisinopril (ZESTRIL) tablet 40 mg, 40 mg, Oral, Daily, Sudarshan Womack PA-C, 40 mg at 08/04/20 0827    metoclopramide (REGLAN) injection 5 mg, 5 mg, Intravenous, TID AC, Madhav Pillai,     NIFEdipine (PROCARDIA XL) 24 hr tablet 60 mg, 60 mg, Oral, BID, Sudarshan Womack PA-C, 60 mg at 08/04/20 0826    ondansetron (ZOFRAN) 8 mg in sodium chloride 0 9 % 50 mL IVPB, 8 mg, Intravenous, Q6H PRN, Geeta Werner MD, Last Rate: 200 mL/hr at 08/04/20 0530, 8 mg at 08/04/20 0530    vancomycin (VANCOCIN) 1,250 mg in sodium chloride 0 9 % 250 mL IVPB, 15 mg/kg (Adjusted), Intravenous, Daily PRN, Wesley Melo MD    Laboratory Results:  Results from last 7 days   Lab Units 08/04/20  0617 08/04/20  0032 08/03/20  0528 08/02/20  0609 08/01/20  0308 07/31/20  1901 07/30/20  1454   WBC Thousand/uL 4 73  --  5 96 6 15 12 99* 11 04* 6 68   HEMOGLOBIN g/dL 7 8*  --  8 1* 7 3* 7 8* 7 7* 8 3*   HEMATOCRIT % 23 8*  --  25 1* 22 2* 24 4* 23 9* 25 5*   PLATELETS Thousands/uL 257  --  261 225 244  241 265 320   POTASSIUM mmol/L  --  3 9 4 1 5 1 5 1 5 3 5 6*   CHLORIDE mmol/L  --  99* 99* 101 101 99* 98*   CO2 mmol/L  --  31 30 28 28 31 31   BUN mg/dL  --  36* 39* 41* 37* 36* 37* CREATININE mg/dL  --  12 96* 12 96* 12 62* 11 88* 11 89* 12 41*   CALCIUM mg/dL  --  9 4 9 1 8 6 9 0 8 6 9 2   MAGNESIUM mg/dL  --   --   --   --  1 9  --   --    PHOSPHORUS mg/dL  --   --   --  7 3* 5 9*  --   --

## 2020-08-04 NOTE — PROGRESS NOTES
Progress Note - Infectious Disease   Almeta Monica Mckeon 37 y o  male MRN: [id number]  Unit/Bed#: S -01 Encounter: [id number]      Impression/Plan:  1- sepsis, POA:  Fever, tachycardia, mild leucocytosis  Suspect sepsis secondary to peritonitis in setting of diarrheal illness with abdominal pain  Admission blood cultures negative at 72 hours  7/31 Peritoneal fluid growing enterococcus faecalis from broth  CT scan showing possible colitis  C diff tox pcr negative     - continues intravenous vancomycin at present with last dose on 8/2/20  -transition to IP vancomycin as below when Vancomycin level < 20  Today's Vancomycin level 24  - follow up final result of blood culture    - pharmacy followinga for vancomycin dosing     2- Peritonitis  Secondary to Enterococcus  Considered bacterial translocation of got from recent diarrheal illness that has resolved  7/31 Peritoneal fluid growing enterococcus faecalis from broth sensitive to ampicillin and vancomycin at CHILO of 2     - Continues Vancomycin IV at present with last dose on 8/2/20  -transition to IP vancomycin as below when Vancomycin level < 20  Today's Vancomycin level 24  -Plan IP vancomycin to complete 2 week course through 08/13/2020   -if patient would have a 3rd episode of peritonitis, consider removal of catheter at that time  3-end-stage renal disease: Patient has been on peritoneal dialysis for the past 2 years  On transplant list, workup done at [address] [place] [address] Way follow-up with Nephrology    4  diabetes mellitus type 1:  With known severe gastroparesis and likely the cause of end-stage renal disease  - management as per primary service     5   Prior episode of peritonitis diagnosed 06/2020, at that time culture was positive for Staph epidermidis   Peritoneal dialysis catheter was kept in place, and patient received 14 days of intraperitoneal vancomycin through 07/01/2020    Above plan discussed in detail with patient and his mother at the bedside, RN and Dr Paula Nguyen Nephrology, MD     Antibiotics:  Vancomycin day 5     Subjective:  Patient still having nausea, vomiting and diarrhea through the night and just vomited again  Patient did not have breakfast  Going to try lunch  Feeling washed out  No point abdominal tenderness  No fever or chills in the past 72 hours  ROS: Patient has no fever, chills, sweats; no cough, shortness of breath  Objective:  Vitals:  Temp:  [98 6 °F (37 °C)-98 9 °F (37 2 °C)] 98 6 °F (37 °C)  HR:  [] 100  Resp:  [18] 18  BP: (149-218)/(70-93) 190/70  SpO2:  [94 %-98 %] 98 %  Temp (24hrs), Av 8 °F (37 1 °C), Min:98 6 °F (37 °C), Max:98 9 °F (37 2 °C)  Current: Temperature: 98 6 °F (37 °C)    General Appearance:  Awake, alert, cooperative, resting in bed, no acute distress  Throat: Oropharynx moist without lesions  Lungs:   Clear to auscultation bilaterally; no wheezes, rhonchi or rales; respirations unlabored   Heart:  RRR; S1-S2 heard, no murmur   Abdomen:   Soft, no palpable tenderness, protuberant, better site capped and nontender, positive bowel sounds       Extremities: No edema, arm IV site nontender   Skin: No rashes      Labs, Imaging, & Other studies:   All pertinent labs and imaging studies were personally reviewed  Results from last 7 days   Lab Units 20  0617 20  0528 20  0609   WBC Thousand/uL 4 73 5 96 6 15   HEMOGLOBIN g/dL 7 8* 8 1* 7 3*   PLATELETS Thousands/uL 257 261 225     Results from last 7 days   Lab Units 20  0032  20  0308 20  1901 20  1454   POTASSIUM mmol/L 3 9   < > 5 1 5 3 5 6*   CHLORIDE mmol/L 99*   < > 101 99* 98*   CO2 mmol/L 31   < > 28 31 31   BUN mg/dL 36*   < > 37* 36* 37*   CREATININE mg/dL 12 96*   < > 11 88* 11 89* 12 41*   EGFR ml/min/1 73sq m 4   < > 5 5 5   CALCIUM mg/dL 9 4   < > 9 0 8 6 9 2   AST U/L  --   --  15 16 17   ALT U/L  --   --  15 22 26   ALK PHOS U/L  --   --  65 66 68    < > = values in this interval not displayed  Results from last 7 days   Lab Units 08/01/20  0308 07/31/20  1901   PROCALCITONIN ng/ml 0 44* 0 27*     Results from last 7 days   Lab Units 08/01/20  1127 07/31/20  2346 07/31/20 2000 07/31/20  1913 07/31/20  1902 07/30/20  1647 07/30/20  1501 07/30/20  1454   BLOOD CULTURE   --   --   --  No Growth at 72 hrs  No Growth at 72 hrs   --  No Growth After 4 Days  No Growth After 4 Days     GRAM STAIN RESULT  2+ Polys  No bacteria seen  --  3+ Polys  No bacteria seen  --   --   --   --   --    BODY FLUID CULTURE, STERILE  No growth  --  Growth in Broth culture only Enterococcus faecalis*  --   --   --   --   --    MRSA CULTURE ONLY   --  No Methicillin Resistant Staphlyococcus aureus (MRSA) isolated  --   --   --   --   --   --    C DIFF TOXIN B   --   --   --   --   --  Negative  --   --

## 2020-08-04 NOTE — ASSESSMENT & PLAN NOTE
· Patient with hypertensive urgency with BP max of 222/93 in the emergency department    · Currently managed with Doxazosin 2mg, Hydralazine 50mg BID, Labetalol 300mg Q12H, Lisinopril 40mg daily, Nifedipine 60mg BID and Torsemide 100mg BID  · Pt refused AM BP meds due to nausea, IV hydralazine given, nurse will reassess may consider other IV medications if still elevated   · Clonidine added PRN  · Hydralazine increased to TID  · Continue to Monitor BP  · Pt patient is chronically hypertensive    Vitals:    08/04/20 0613 08/04/20 0728 08/04/20 0943 08/04/20 1100   BP: (!) 190/74 (!) 218/90 (!) 190/70 (!) 205/95   BP Location: Right arm Right arm     Pulse:  100     Resp:  18     Temp:  98 6 °F (37 °C)     TempSrc:  Oral     SpO2:  98%     Weight:       Height:

## 2020-08-04 NOTE — ASSESSMENT & PLAN NOTE
Lab Results   Component Value Date    HGBA1C 6 8 (H) 06/21/2020       Recent Labs     08/03/20  1656 08/03/20  2107 08/04/20  0749 08/04/20  1128   POCGLU 108 281* 237* 247*       Blood Sugar Average: Last 72 hrs:  (P) 210 875   · 30 year hx of type 1 diabetes with history of inconsistent sugars  Diagnosed at 1years old  · 1 dose of metoclopramide was given 07/31/2020  · Continue home medications including Lantus 5 units after breakfast   · Continue Humalog 4 units with breakfast, 2 units with lunch, 4 units with dinner  · Continue Novolin 4 units HS  · Sliding scale insulin  · Accu-Cheks q i d  Jim Rolon

## 2020-08-04 NOTE — PROGRESS NOTES
Progress Note Edilberto Bruno 1985, 37 y o  male MRN: [id number]    Unit/Bed#: S -01 Encounter: [id number]    Primary Care Provider: Gio Golden DO   Date and time admitted to hospital: 7/31/2020  5:07 PM        Peritoneal dialysis catheter in place [facility])  Assessment & Plan  · Patient with peritoneal dialysis catheter in place  · Creatinine baseline from 12-14  · Nephrology onboard  · Per nephro and ID catheter is to remain in place and pt treated with abx    Anemia  Assessment & Plan  Patient has chronic anemia  Hgb on AM labs was 7 8  Consider transfusing if hemoglobin less than 7, however given that the patient is on the transplant list at [facility], the transfusion of blood could prevent him from getting transplanted  Discuss with patient and family before and transfuse patient if critically needed, and consider repeating Hgb/Hct before administartion  Consent obtained    History of lacunar cerebrovascular accident (CVA)  Assessment & Plan  · Continue ASA, and statin  · Continue BP meds with the addition of Clonidine    Type 1 diabetes mellitus with diabetic gastropathy [facility])  Assessment & Plan  Lab Results   Component Value Date    HGBA1C 6 8 (H) 06/21/2020       Recent Labs     08/03/20  1656 08/03/20  2107 08/04/20  0749 08/04/20  1128   POCGLU 108 281* 237* 247*       Blood Sugar Average: Last 72 hrs:  (P) 210 875   · 30 year hx of type 1 diabetes with history of inconsistent sugars  Diagnosed at 1years old  · 1 dose of metoclopramide was given 07/31/2020  · Continue home medications including Lantus 5 units after breakfast   · Continue Humalog 4 units with breakfast, 2 units with lunch, 4 units with dinner  · Continue Novolin 4 units HS  · Sliding scale insulin  · Accu-Cheks q i d  Hypertensive urgency  Assessment & Plan  · Patient with hypertensive urgency with BP max of 222/93 in the emergency department    · Currently managed with Doxazosin 2mg, Hydralazine 50mg BID, Labetalol 300mg Q12H, Lisinopril 40mg daily, Nifedipine 60mg BID and Torsemide 100mg BID  · Pt refused AM BP meds due to nausea, IV hydralazine given, nurse will reassess may consider other IV medications if still elevated   · Clonidine added PRN  · Hydralazine increased to TID  · Continue to Monitor BP  · Pt patient is chronically hypertensive    Vitals:    08/04/20 0613 08/04/20 0728 08/04/20 0943 08/04/20 1100   BP: (!) 190/74 (!) 218/90 (!) 190/70 (!) 205/95   BP Location: Right arm Right arm     Pulse:  100     Resp:  18     Temp:  98 6 °F (37 °C)     TempSrc:  Oral     SpO2:  98%     Weight:       Height:             * Peritonitis (HCC)  Assessment & Plan  · Presents with abdominal pain and rigors  Patient has been admitted for peritonitis in the past and was recently discharged dignosed with staph epidermis peritonitis and sent with a course of intraperitoneal Vancomycin for 14 days  · CT scan showed Wall thickening of the proximal sigmoid colon and distal descending colon could relate to underdistention or colitis  Moderate amount of abdominal and pelvic ascites with left-sided catheter terminating in the medial pelvis  C diff negative on 7/30   · Body cultures preliminary results from 1 culture showed enterococci  · Follow body cultures   · Consulted ID and nephrology   · Follow WBC and VS - no leukocytosis noted on labs today  · Follow fecal calprotectin  · Awaiting results for stool enteric pathogens    · Per nephro and ID catheter is to remain in place and pt treated with abx  · We will defer to ID and nephrology for discharge plan        VTE Pharmacologic Prophylaxis:   Pharmacologic: Heparin  Mechanical VTE Prophylaxis in Place: Yes    Discussions with Specialists or Other Care Team Provider:  Nephrology and infectious disease    Education and Discussions with Family / Patient:  A very detailed medication reconciliation was completed yesterday    Current Length of Stay: 4 day(s)    Current Patient Status: Inpatient     Discharge Plan / Estimated Discharge Date:  Continue inpatient treatment, I will defer to Nephrology and Infectious Disease regarding discharge    Code Status: Level 1 - Full Code      Subjective:   Patient seen and examined  No acute events overnight  Patient is reporting is still having episodes of nausea and vomiting  Will add Reglan to his medication regime t i d  Before meals  Objective:     Vitals:   Temp (24hrs), Av 8 °F (37 1 °C), Min:98 6 °F (37 °C), Max:98 9 °F (37 2 °C)    Temp:  [98 6 °F (37 °C)-98 9 °F (37 2 °C)] 98 6 °F (37 °C)  HR:  [] 100  Resp:  [18] 18  BP: (149-218)/(70-95) 205/95  SpO2:  [94 %-98 %] 98 %  Body mass index is 31 8 kg/m²  Input and Output Summary (last 24 hours): Intake/Output Summary (Last 24 hours) at 2020 1233  Last data filed at 2020 0600  Gross per 24 hour   Intake    Output 1 ml   Net -1 ml       Physical Exam:     Physical Exam   Constitutional: He is oriented to person, place, and time  He appears well-developed and well-nourished  No distress  HENT:   Head: Normocephalic and atraumatic  Nose: Nose normal    Eyes: Pupils are equal, round, and reactive to light  Conjunctivae and EOM are normal    Cardiovascular: Normal rate and regular rhythm  Exam reveals no gallop and no friction rub  No murmur heard  Pulmonary/Chest: Effort normal and breath sounds normal  No respiratory distress  Abdominal: Soft  Bowel sounds are normal  He exhibits no distension  There is no abdominal tenderness  Neurological: He is alert and oriented to person, place, and time  Skin: Skin is warm and dry  He is not diaphoretic  Psychiatric: He has a normal mood and affect  His behavior is normal  Judgment and thought content normal          Additional Data:     Labs: I have personally reviewed pertinent reports    Results from last 7 days   Lab Units 20  0617 20  0528 20  0609 20  0308 20  1901   WBC Thousand/uL 4  73 5 96 6 15 12 99* 11 04*   HEMOGLOBIN g/dL 7 8* 8 1* 7 3* 7 8* 7 7*   HEMATOCRIT % 23 8* 25 1* 22 2* 24 4* 23 9*   PLATELETS Thousands/uL 257 261 225 244  241 265   NEUTROS PCT %  --  62  --  82* 83*   MONOS PCT %  --  8  --  5 4      Results from last 7 days   Lab Units 08/04/20  0032 08/03/20  0528 08/02/20  0609 08/01/20  0308 07/31/20  1901 07/30/20  1454   POTASSIUM mmol/L 3 9 4 1 5 1 5 1 5 3 5 6*   CHLORIDE mmol/L 99* 99* 101 101 99* 98*   CO2 mmol/L 31 30 28 28 31 31   BUN mg/dL 36* 39* 41* 37* 36* 37*   CREATININE mg/dL 12 96* 12 96* 12 62* 11 88* 11 89* 12 41*   CALCIUM mg/dL 9 4 9 1 8 6 9 0 8 6 9 2   ALK PHOS U/L  --   --   --  65 66 68   ALT U/L  --   --   --  15 22 26   AST U/L  --   --   --  15 16 17     Results from last 7 days   Lab Units 08/01/20  0308   MAGNESIUM mg/dL 1 9     Results from last 7 days   Lab Units 08/02/20  0609 08/01/20  0308   PHOSPHORUS mg/dL 7 3* 5 9*          Results from last 7 days   Lab Units 07/31/20  1901   LACTIC ACID mmol/L 1 1           * Additional Pertinent Lab Tests Reviewed: Ellen 64 Admission Reviewed    Radiology Results: I have personally reviewed pertinent reports  Ct Abdomen Pelvis Wo Contrast    Result Date: 7/31/2020  Impression: Wall thickening of the proximal sigmoid colon and distal descending colon could relate to underdistention or colitis  Moderate amount of abdominal and pelvic ascites with left-sided catheter terminating in the medial pelvis  Left lower lobe pulmonary nodules measuring up to 9 mm  This is minimally increased in size compared to the prior exams dating back to 2018  Follow-up recommended in 12 months  Trace pericardial effusion  Small hiatal hernia  Diffuse bladder wall thickening could relate to underdistention or cystitis, correlate with urinalysis   Workstation performed: [id number]       Recent Cultures (last 7 days):     Results from last 7 days   Lab Units 08/01/20  1127 07/31/20 2000 07/31/20  1913 07/31/20  1902 07/30/20  1647 07/30/20  1501 07/30/20  1454   BLOOD CULTURE   --   --  No Growth at 72 hrs  No Growth at 72 hrs   --  No Growth After 4 Days  No Growth After 4 Days     GRAM STAIN RESULT  2+ Polys  No bacteria seen 3+ Polys  No bacteria seen  --   --   --   --   --    BODY FLUID CULTURE, STERILE  No growth Growth in Broth culture only Enterococcus faecalis*  --   --   --   --   --    C DIFF TOXIN B   --   --   --   --  Negative  --   --        Last 24 Hours Medication List:   acetaminophen, 650 mg, Oral, Q6H PRN, Sudarshan Womack PA-C  aspirin, 81 mg, Oral, Daily, Sudarshan Womack PA-C  atorvastatin, 40 mg, Oral, HS, Sudarshan Womack PA-C  calcium acetate, 667 mg, Oral, TID With Meals, Sudarshan Womack PA-C  cholecalciferol, 2,000 Units, Oral, Daily, Sudarshan Womack PA-C  cinacalcet, 60 mg, Oral, Every Other Day, Sudarshan Womack PA-C  cloNIDine, 0 1 mg, Oral, BID PRN, Dariel Arauz MD  divalproex sodium, 500 mg, Oral, Daily, Sudarshan Womack PA-C  doxazosin, 2 mg, Oral, HS, Sudarshan Womack PA-C  escitalopram, 10 mg, Oral, Daily, Sudarshan Womack PA-C  famotidine, 10 mg, Oral, Daily, Sudarshan Womack PA-C  gabapentin, 100 mg, Oral, HS, Sudarshan Womack PA-C  gentamicin, , Topical, Daily, Jian Bronson MD  heparin (porcine), 5,000 Units, Subcutaneous, Q8H [address], Sudarshan Womack PA-C  hydrALAZINE, 10 mg, Intravenous, Q4H PRN, Dariel Arauz MD  hydrALAZINE, 50 mg, Oral, Q8H HALIE, Madhav Pillai DO  insulin glargine, 5 Units, Subcutaneous, After Breakfast, Sudarshan Womack PA-C  insulin lispro, 1-5 Units, Subcutaneous, HS, Sudarshan Womack PA-C  insulin lispro, 1-6 Units, Subcutaneous, TID AC, Sudarshan Womack PA-C  insulin lispro, 2 Units, Subcutaneous, Daily With Lunch, AURELIANO Valdez-CLOTILDE  insulin lispro, 4 Units, Subcutaneous, Daily With Breakfast, Sudarshan Womack PA-C  insulin lispro, 4 Units, Subcutaneous, Daily With Dinner, Sudarshan Womack PA-C  insulin NPH, 5 Units, Subcutaneous, HS, Sudarshan Womack, SAV  labetalol, 300 mg, Oral, Q12H [facility], Sudarshan Womack PA-C  lisinopril, 40 mg, Oral, Daily, Sudarshan Womack PA-C  metoclopramide, 5 mg, Intravenous, TID AC, Madhav Pillai DO  NIFEdipine ER, 60 mg, Oral, BID, Sudarshan Womack PA-C  ondansetron, 8 mg, Intravenous, Q6H PRN, Geeta Werner MD, Last Rate: 8 mg (08/04/20 0530)  vancomycin, 15 mg/kg (Adjusted), Intravenous, Daily PRN, Liliam Curran MD         Today, Patient Was Seen By: Gomez Brice, [address] [place] [address]  Internal Medicine Residency PGY-1    Portions of the record may have been created with voice recognition software  Occasional wrong word or "sound a like" substitutions may have occurred due to the inherent limitations of voice recognition software  Read the chart carefully and recognize, using context, where substitutions have occurred

## 2020-08-04 NOTE — ASSESSMENT & PLAN NOTE
· Presents with abdominal pain and rigors  Patient has been admitted for peritonitis in the past and was recently discharged dignosed with staph epidermis peritonitis and sent with a course of intraperitoneal Vancomycin for 14 days  · CT scan showed Wall thickening of the proximal sigmoid colon and distal descending colon could relate to underdistention or colitis  Moderate amount of abdominal and pelvic ascites with left-sided catheter terminating in the medial pelvis  C diff negative on 7/30   · Body cultures preliminary results from 1 culture showed enterococci  · Follow body cultures   · Consulted ID and nephrology   · Follow WBC and VS - no leukocytosis noted on labs today  · Follow fecal calprotectin  · Awaiting results for stool enteric pathogens    · Per nephro and ID catheter is to remain in place and pt treated with abx  · We will defer to ID and nephrology for discharge plan

## 2020-08-04 NOTE — ASSESSMENT & PLAN NOTE
Patient has chronic anemia  Hgb on AM labs was 7 8  Consider transfusing if hemoglobin less than 7, however given that the patient is on the transplant list at [facility], the transfusion of blood could prevent him from getting transplanted  Discuss with patient and family before and transfuse patient if critically needed, and consider repeating Hgb/Hct before administartion    Consent obtained

## 2020-08-05 LAB
ANION GAP SERPL CALCULATED.3IONS-SCNC: 8 MMOL/L (ref 4–13)
BASOPHILS # BLD AUTO: 0.05 THOUSANDS/ΜL (ref 0–0.1)
BASOPHILS NFR BLD AUTO: 1 % (ref 0–1)
BUN SERPL-MCNC: 31 MG/DL (ref 5–25)
CALCIUM SERPL-MCNC: 9.5 MG/DL (ref 8.3–10.1)
CHLORIDE SERPL-SCNC: 100 MMOL/L (ref 100–108)
CO2 SERPL-SCNC: 32 MMOL/L (ref 21–32)
CREAT SERPL-MCNC: 13.04 MG/DL (ref 0.6–1.3)
EOSINOPHIL # BLD AUTO: 0.42 THOUSAND/ΜL (ref 0–0.61)
EOSINOPHIL NFR BLD AUTO: 8 % (ref 0–6)
ERYTHROCYTE [DISTWIDTH] IN BLOOD BY AUTOMATED COUNT: 14.4 % (ref 11.6–15.1)
GFR SERPL CREATININE-BSD FRML MDRD: 4 ML/MIN/1.73SQ M
GLUCOSE SERPL-MCNC: 154 MG/DL (ref 65–140)
GLUCOSE SERPL-MCNC: 166 MG/DL (ref 65–140)
GLUCOSE SERPL-MCNC: 169 MG/DL (ref 65–140)
GLUCOSE SERPL-MCNC: 170 MG/DL (ref 65–140)
GLUCOSE SERPL-MCNC: 204 MG/DL (ref 65–140)
GLUCOSE SERPL-MCNC: 234 MG/DL (ref 65–140)
HCT VFR BLD AUTO: 25.3 % (ref 36.5–49.3)
HGB BLD-MCNC: 8.5 G/DL (ref 12–17)
IMM GRANULOCYTES # BLD AUTO: 0.03 THOUSAND/UL (ref 0–0.2)
IMM GRANULOCYTES NFR BLD AUTO: 1 % (ref 0–2)
LYMPHOCYTES # BLD AUTO: 1.59 THOUSANDS/ΜL (ref 0.6–4.47)
LYMPHOCYTES NFR BLD AUTO: 28 % (ref 14–44)
MCH RBC QN AUTO: 32.1 PG (ref 26.8–34.3)
MCHC RBC AUTO-ENTMCNC: 33.6 G/DL (ref 31.4–37.4)
MCV RBC AUTO: 96 FL (ref 82–98)
MONOCYTES # BLD AUTO: 0.5 THOUSAND/ΜL (ref 0.17–1.22)
MONOCYTES NFR BLD AUTO: 9 % (ref 4–12)
NEUTROPHILS # BLD AUTO: 3.01 THOUSANDS/ΜL (ref 1.85–7.62)
NEUTS SEG NFR BLD AUTO: 53 % (ref 43–75)
NRBC BLD AUTO-RTO: 0 /100 WBCS
PLATELET # BLD AUTO: 282 THOUSANDS/UL (ref 149–390)
PMV BLD AUTO: 10.1 FL (ref 8.9–12.7)
POTASSIUM SERPL-SCNC: 3.8 MMOL/L (ref 3.5–5.3)
RBC # BLD AUTO: 2.65 MILLION/UL (ref 3.88–5.62)
SARS-COV-2 RNA RESP QL NAA+PROBE: NEGATIVE
SODIUM SERPL-SCNC: 140 MMOL/L (ref 136–145)
VANCOMYCIN SERPL-MCNC: 19.3 UG/ML
WBC # BLD AUTO: 5.6 THOUSAND/UL (ref 4.31–10.16)

## 2020-08-05 PROCEDURE — 99232 SBSQ HOSP IP/OBS MODERATE 35: CPT | Performed by: INTERNAL MEDICINE

## 2020-08-05 PROCEDURE — 99223 1ST HOSP IP/OBS HIGH 75: CPT | Performed by: INTERNAL MEDICINE

## 2020-08-05 PROCEDURE — 3E1M39Z IRRIGATION OF PERITONEAL CAVITY USING DIALYSATE, PERCUTANEOUS APPROACH: ICD-10-PCS | Performed by: INTERNAL MEDICINE

## 2020-08-05 PROCEDURE — 80202 ASSAY OF VANCOMYCIN: CPT | Performed by: INTERNAL MEDICINE

## 2020-08-05 PROCEDURE — 85025 COMPLETE CBC W/AUTO DIFF WBC: CPT | Performed by: PSYCHIATRY & NEUROLOGY

## 2020-08-05 PROCEDURE — 87635 SARS-COV-2 COVID-19 AMP PRB: CPT | Performed by: PHYSICIAN ASSISTANT

## 2020-08-05 PROCEDURE — 82948 REAGENT STRIP/BLOOD GLUCOSE: CPT

## 2020-08-05 PROCEDURE — 80048 BASIC METABOLIC PNL TOTAL CA: CPT | Performed by: PSYCHIATRY & NEUROLOGY

## 2020-08-05 RX ORDER — METOCLOPRAMIDE 5 MG/1
5 TABLET ORAL
Status: DISCONTINUED | OUTPATIENT
Start: 2020-08-05 | End: 2020-08-06 | Stop reason: HOSPADM

## 2020-08-05 RX ORDER — METOCLOPRAMIDE 10 MG/1
10 TABLET ORAL
Status: DISCONTINUED | OUTPATIENT
Start: 2020-08-05 | End: 2020-08-05

## 2020-08-05 RX ADMIN — ASPIRIN 81 MG 81 MG: 81 TABLET ORAL at 08:00

## 2020-08-05 RX ADMIN — INSULIN LISPRO 4 UNITS: 100 INJECTION, SOLUTION INTRAVENOUS; SUBCUTANEOUS at 08:02

## 2020-08-05 RX ADMIN — INSULIN LISPRO 1 UNITS: 100 INJECTION, SOLUTION INTRAVENOUS; SUBCUTANEOUS at 08:02

## 2020-08-05 RX ADMIN — INSULIN LISPRO 2 UNITS: 100 INJECTION, SOLUTION INTRAVENOUS; SUBCUTANEOUS at 14:13

## 2020-08-05 RX ADMIN — NIFEDIPINE 60 MG: 30 TABLET, EXTENDED RELEASE ORAL at 17:55

## 2020-08-05 RX ADMIN — INSULIN LISPRO 2 UNITS: 100 INJECTION, SOLUTION INTRAVENOUS; SUBCUTANEOUS at 21:36

## 2020-08-05 RX ADMIN — GENTAMICIN SULFATE: 1 OINTMENT TOPICAL at 08:01

## 2020-08-05 RX ADMIN — CINACALCET 60 MG: 30 TABLET, FILM COATED ORAL at 08:00

## 2020-08-05 RX ADMIN — HYDRALAZINE HYDROCHLORIDE 50 MG: 25 TABLET ORAL at 21:30

## 2020-08-05 RX ADMIN — CALCIUM ACETATE 667 MG: 667 CAPSULE ORAL at 15:55

## 2020-08-05 RX ADMIN — METOCLOPRAMIDE HYDROCHLORIDE 5 MG: 5 TABLET ORAL at 15:55

## 2020-08-05 RX ADMIN — VANCOMYCIN HYDROCHLORIDE 1250 MG: 1 INJECTION, POWDER, LYOPHILIZED, FOR SOLUTION INTRAVENOUS at 10:33

## 2020-08-05 RX ADMIN — HYDRALAZINE HYDROCHLORIDE 50 MG: 25 TABLET ORAL at 14:12

## 2020-08-05 RX ADMIN — ATORVASTATIN CALCIUM 40 MG: 40 TABLET, FILM COATED ORAL at 21:30

## 2020-08-05 RX ADMIN — ESCITALOPRAM OXALATE 10 MG: 10 TABLET ORAL at 08:00

## 2020-08-05 RX ADMIN — INSULIN LISPRO 1 UNITS: 100 INJECTION, SOLUTION INTRAVENOUS; SUBCUTANEOUS at 14:13

## 2020-08-05 RX ADMIN — METOCLOPRAMIDE HYDROCHLORIDE 5 MG: 5 TABLET ORAL at 21:23

## 2020-08-05 RX ADMIN — GABAPENTIN 100 MG: 100 CAPSULE ORAL at 21:30

## 2020-08-05 RX ADMIN — INSULIN GLARGINE 5 UNITS: 100 INJECTION, SOLUTION SUBCUTANEOUS at 07:59

## 2020-08-05 RX ADMIN — DIVALPROEX SODIUM 500 MG: 500 TABLET, FILM COATED, EXTENDED RELEASE ORAL at 08:00

## 2020-08-05 RX ADMIN — METOCLOPRAMIDE HYDROCHLORIDE 5 MG: 5 INJECTION INTRAMUSCULAR; INTRAVENOUS at 06:01

## 2020-08-05 RX ADMIN — HYDRALAZINE HYDROCHLORIDE 50 MG: 25 TABLET ORAL at 05:59

## 2020-08-05 RX ADMIN — ONDANSETRON 8 MG: 2 INJECTION INTRAMUSCULAR; INTRAVENOUS at 23:09

## 2020-08-05 RX ADMIN — INSULIN LISPRO 4 UNITS: 100 INJECTION, SOLUTION INTRAVENOUS; SUBCUTANEOUS at 15:53

## 2020-08-05 RX ADMIN — CALCIUM ACETATE 667 MG: 667 CAPSULE ORAL at 08:00

## 2020-08-05 RX ADMIN — CALCIUM ACETATE 667 MG: 667 CAPSULE ORAL at 14:12

## 2020-08-05 RX ADMIN — HEPARIN SODIUM 5000 UNITS: 5000 INJECTION INTRAVENOUS; SUBCUTANEOUS at 21:31

## 2020-08-05 RX ADMIN — LISINOPRIL 40 MG: 20 TABLET ORAL at 08:00

## 2020-08-05 RX ADMIN — HEPARIN SODIUM 5000 UNITS: 5000 INJECTION INTRAVENOUS; SUBCUTANEOUS at 05:44

## 2020-08-05 RX ADMIN — DOXAZOSIN 2 MG: 1 TABLET ORAL at 21:30

## 2020-08-05 RX ADMIN — INSULIN LISPRO 1 UNITS: 100 INJECTION, SOLUTION INTRAVENOUS; SUBCUTANEOUS at 15:54

## 2020-08-05 RX ADMIN — INSULIN HUMAN 5 UNITS: 100 INJECTION, SUSPENSION SUBCUTANEOUS at 21:36

## 2020-08-05 RX ADMIN — LABETALOL HYDROCHLORIDE 300 MG: 100 TABLET, FILM COATED ORAL at 08:00

## 2020-08-05 RX ADMIN — HEPARIN SODIUM 5000 UNITS: 5000 INJECTION INTRAVENOUS; SUBCUTANEOUS at 14:12

## 2020-08-05 RX ADMIN — NIFEDIPINE 60 MG: 30 TABLET, EXTENDED RELEASE ORAL at 08:00

## 2020-08-05 RX ADMIN — LABETALOL HYDROCHLORIDE 300 MG: 100 TABLET, FILM COATED ORAL at 21:30

## 2020-08-05 RX ADMIN — CHOLECALCIFEROL TAB 25 MCG (1000 UNIT) 2000 UNITS: 25 TAB at 08:01

## 2020-08-05 RX ADMIN — FAMOTIDINE 10 MG: 20 TABLET, FILM COATED ORAL at 08:01

## 2020-08-05 NOTE — ASSESSMENT & PLAN NOTE
Lab Results   Component Value Date    HGBA1C 6 8 (H) 06/21/2020       Recent Labs     08/04/20  1128 08/04/20  1551 08/04/20 2048 08/05/20  0749   POCGLU 247* 211* 172* 204*       Blood Sugar Average: Last 72 hrs:  (P) 811 [id number]   · 30 year hx of type 1 diabetes with history of inconsistent sugars  Diagnosed at 1years old  · 1 dose of metoclopramide was given 07/31/2020  · Continue home medications including Lantus 5 units after breakfast   · Continue Humalog 4 units with breakfast, 2 units with lunch, 4 units with dinner  · Continue Novolin 4 units HS  · Sliding scale insulin  · Accu-Cheks aashish i d  Eli Nagel

## 2020-08-05 NOTE — PROGRESS NOTES
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 and heterozygote for prothombin gene mutation, type 1 diabetes mellitus, end-stage renal disease on PD, recent peritonitis with Staph epi June 2020 who presents with abdominal pain       1  End-stage renal disease on peritoneal dialysis:  · Managed at [facility]  · Target weight 101 kg  · PD prescription CCPD:  Total fills 5 with 4 fills at 2200 mL and last fill 1500 mL   Various percentages of dextrose at night   1 5% day dwell which he drains at 1600 hours and refills with 2 5%   Low average transporter  · Currently on CAPD:  1 5% 2 L exchange  · No fibrin noted in PD fluid   · Ultrafiltration approximately 8-900 mL  · Plan:   ? No changes to current prescription  2  Peritonitis:  · PD fluid:  ?  Initial Gram 7/31 stain Enterococcus   Repeat culture 8/1 shows no growth  ? WBC 7000 on admission-->4600-->527-->203-->127  ? Neutrophil count:  71% on admission with subsequent counts:  46%-->6%-->4%-->2%   · External contamination vs gut translocation leading to peritonitis  · CT scan thickened proximal sigmoid colon-prolonged course of diarrhea  · Recent peritonitis with Staph epi June 2020  LAST DOSE OF VANCOMYCIN JULY 2, 2020  Surveillance cultures on July 23rd negative/no growth  · Antibiotics:  Currently on vancomycin IV   Last dose of Flagyl 8/2  ? VANCOMYCIN LEVEL 27 8-->19--> 27 7-->24 1   · Plan:  ? No plan to remove PD catheter at this time  Organism 1s different than prior and quickly cleared  ? Continue vancomycin per ID  ? At discharge will inform outpatient clinic of intraperitoneal vancomycin with plan to treat through 8/13  3   Hypertension:    · On doxazosin, hydralazine, labetalol, lisinopril, nifedipine   Also on metolazone at home which is on hold   Clonidine use p r n  · Blood pressure remains elevated-missing some doses of medication due to persistent vomiting  Hydralazine recently increased to t i d   Continue current medications and as needed clonidine  4  Anemia:    · Hemoglobin 8 1-->7 8--> He 0 5  · Epogen dose 99932 units every other week  ? Last dose of Epogen on 07/23/2020   Next dose due on August 6th which we will provide if he remains hospitalized  ? Goal hemoglobin 9-10 due to history of seizure/CVA  · On transplant list-if transfusion needed use leuko reduced, CMV negative blood  5  CKD MBD:  Hyperphosphatemia- On PhosLo/lanthum, vitamin-D, Sensipar    6  Diabetes mellitus type 1:  Management per primary team  7  Vomiting:  Ongoing issues  Improved with Reglan  Previous EGD scheduled for 8/6 which may be performed inpatient    DISPOSITION:  No change in PD prescription      SUBJECTIVE / INTERVAL HISTORY:  Feeling better since starting Reglan  No abdominal pain    OBJECTIVE:  Current Weight: Weight - Scale: 97 4 kg (214 lb 12 8 oz)  Vitals:    08/04/20 2230 08/05/20 0559 08/05/20 0747 08/05/20 0812   BP:  (!) 190/96 (!) 187/86    BP Location:   Right arm    Pulse: 101  103    Resp: 18  18    Temp: 98 5 °F (36 9 °C)  98 7 °F (37 1 °C)    TempSrc: Oral  Oral    SpO2: 96%  96%    Weight:    97 4 kg (214 lb 12 8 oz)   Height:           Intake/Output Summary (Last 24 hours) at 8/5/2020 1142  Last data filed at 8/4/2020 2000  Gross per 24 hour   Intake 240 ml   Output 0 ml   Net 240 ml     General: NAD, comfortably walking around in the room and in the [facility]    Skin: no rash  Eyes: anicteric sclera  ENT: moist mucous membrane  Neck: supple  Chest: CTA b/l, no ronchii, no wheeze, no rubs, no rales  CVS: s1s2, no murmur, no gallop, no rub  Abdomen: soft, nontender, nl sounds  Extremities: no significant edema LE b/l  : no preston  Neuro: AAOX3  Psych: normal affect  Medications:    Current Facility-Administered Medications:     acetaminophen (TYLENOL) tablet 650 mg, 650 mg, Oral, Q6H PRN, Sudarshan Womack PA-C, 650 mg at 08/02/20 1828    aspirin chewable tablet 81 mg, 81 mg, Oral, Daily, Sudarshan Womack PA-C, 81 mg at 08/05/20 0800    atorvastatin (LIPITOR) tablet 40 mg, 40 mg, Oral, HS, Sudarshan Womack PA-C, 40 mg at 08/04/20 2206    calcium acetate (PHOSLO) capsule 667 mg, 667 mg, Oral, TID With Meals, Isidro Webber PA-C, 667 mg at 08/05/20 0800    cholecalciferol (VITAMIN D3) tablet 2,000 Units, 2,000 Units, Oral, Daily, Sudarshan Womack PA-C, 2,000 Units at 08/05/20 0801    cinacalcet (SENSIPAR) tablet 60 mg, 60 mg, Oral, Every Other Day, Sudarshan Womack PA-C, 60 mg at 08/05/20 0800    cloNIDine (CATAPRES) tablet 0 1 mg, 0 1 mg, Oral, BID PRN, Hugh Hawley MD    divalproex sodium (DEPAKOTE ER) 24 hr tablet 500 mg, 500 mg, Oral, Daily, Sudarshan Womack PA-C, 500 mg at 08/05/20 0800    doxazosin (CARDURA) tablet 2 mg, 2 mg, Oral, HS, Sudarshan Womack PA-C, 2 mg at 08/04/20 2215    escitalopram (LEXAPRO) tablet 10 mg, 10 mg, Oral, Daily, Sudarshan Womack PA-C, 10 mg at 08/05/20 0800    famotidine (PEPCID) tablet 10 mg, 10 mg, Oral, Daily, Sudarshan Womack PA-C, 10 mg at 08/05/20 0801    gabapentin (NEURONTIN) capsule 100 mg, 100 mg, Oral, HS, Sudarshan Womack PA-C, 100 mg at 08/04/20 2205    gentamicin (GARAMYCIN) 0 1 % ointment, , Topical, Daily, Rodney June MD    heparin (porcine) subcutaneous injection 5,000 Units, 5,000 Units, Subcutaneous, Q8H [facility], 5,000 Units at 08/05/20 0544 **AND** [COMPLETED] Platelet count, , , Once, Sudarshan Womack PA-C    hydrALAZINE (APRESOLINE) injection 10 mg, 10 mg, Intravenous, Q4H PRN, Hugh Hawley MD, 10 mg at 08/04/20 1146    hydrALAZINE (APRESOLINE) tablet 50 mg, 50 mg, Oral, Q8H [facility], Madhav Pillai DO, 50 mg at 08/05/20 0559    insulin glargine (LANTUS) subcutaneous injection 5 Units 0 05 mL, 5 Units, Subcutaneous, After Breakfast, Sudarshan Womack PA-C, 5 Units at 08/05/20 0759    insulin lispro (HumaLOG) 100 units/mL subcutaneous injection 1-5 Units, 1-5 Units, Subcutaneous, HS, Sudarshan Womack PA-C, 1 Units at 08/04/20 2209    insulin lispro (HumaLOG) 100 units/mL subcutaneous injection 1-6 Units, 1-6 Units, Subcutaneous, TID AC, 1 Units at 08/05/20 0802 **AND** Fingerstick Glucose (POCT), , , TID AC, Sudarshan Womack PA-C    insulin lispro (HumaLOG) 100 units/mL subcutaneous injection 2 Units, 2 Units, Subcutaneous, Daily With Lunch, Sudarshan Womack PA-C    insulin lispro (HumaLOG) 100 units/mL subcutaneous injection 4 Units, 4 Units, Subcutaneous, Daily With Breakfast, Sudarshan Womack PA-C, 4 Units at 08/05/20 0802    insulin lispro (HumaLOG) 100 units/mL subcutaneous injection 4 Units, 4 Units, Subcutaneous, Daily With Mikey Bernardo PA-C, 4 Units at 08/04/20 1717    insulin NPH (HumuLIN N,NovoLIN N) 100 Units/mL subcutaneous injection 5 Units, 5 Units, Subcutaneous, HS, Sudarshan Womack PA-C, 5 Units at 08/04/20 2222    labetalol (NORMODYNE) tablet 300 mg, 300 mg, Oral, Q12H [address], Sudarshan Womack PA-C, 300 mg at 08/05/20 0800    lisinopril (ZESTRIL) tablet 40 mg, 40 mg, Oral, Daily, Sudarshan Womack PA-C, 40 mg at 08/05/20 0800    metoclopramide (REGLAN) injection 5 mg, 5 mg, Intravenous, TID AC, Madhav Pillai, , 5 mg at 08/05/20 0601    NIFEdipine (PROCARDIA XL) 24 hr tablet 60 mg, 60 mg, Oral, BID, Sudarshan Womack PA-C, 60 mg at 08/05/20 0800    ondansetron (ZOFRAN) 8 mg in sodium chloride 0 9 % 50 mL IVPB, 8 mg, Intravenous, Q6H PRN, [place], MD, Last Rate: 200 mL/hr at 08/04/20 2300, 8 mg at 08/04/20 2300    vancomycin (VANCOCIN) 1,250 mg in sodium chloride 0 9 % 250 mL IVPB, 15 mg/kg (Adjusted), Intravenous, Daily PRN, Wesley Melo MD    vancomycin (VANCOCIN) 1,250 mg in sodium chloride 0 9 % 250 mL IVPB, 15 mg/kg (Adjusted), Intravenous, Once, Safia Castro MD, Last Rate: 166 7 mL/hr at 08/05/20 1033, 1,250 mg at 08/05/20 [id number]    Laboratory Results:  Results from last 7 days   Lab Units 08/05/20  0816 08/04/20  0617 08/04/20  0032 08/03/20  0528 08/02/20  0609 08/01/20  0308 07/31/20  1901 07/30/20  1454   WBC Thousand/uL 5 60 4 73  --  5 96 6 15 12 99* 11 04* 6 68   HEMOGLOBIN g/dL 8 5* 7 8*  --  8 1* 7 3* 7 8* 7 7* 8 3*   HEMATOCRIT % 25 3* 23 8*  --  25 1* 22 2* 24 4* 23 9* 25 5*   PLATELETS Thousands/uL 282 257  --  261 225 244  241 265 320   POTASSIUM mmol/L 3 8  --  3 9 4 1 5 1 5 1 5 3 5 6*   CHLORIDE mmol/L 100  --  99* 99* 101 101 99* 98*   CO2 mmol/L 32  --  31 30 28 28 31 31   BUN mg/dL 31*  --  36* 39* 41* 37* 36* 37*   CREATININE mg/dL 13 04*  --  12 96* 12 96* 12 62* 11 88* 11 89* 12 41*   CALCIUM mg/dL 9 5  --  9 4 9 1 8 6 9 0 8 6 9 2   MAGNESIUM mg/dL  --   --   --   --   --  1 9  --   --    PHOSPHORUS mg/dL  --   --   --   --  7 3* 5 9*  --   --

## 2020-08-05 NOTE — ASSESSMENT & PLAN NOTE
Followed as an outpt by Dr Tanner Grace  EGD scheduled for tomorrow 8/6, will message GI to see if can be done as inpatient  Pt reports that he is improving with addition of Reglan yesterday TID before meals, only had one episode of N/V since starting it  A1c 6 8 - 6/21/2020    Plan  - Contact GI regarding EGD  - Continue with Reglan TID before meals and Zofran PRN    Recent Labs     08/04/20  1128 08/04/20  1551 08/04/20  2048 08/05/20  0749   POCGLU 247* 211* 172* 204*

## 2020-08-05 NOTE — PROGRESS NOTES
Vancomycin IV Pharmacy-to-Dose Consultation    Perry Nayak is a 37 y o  male who is currently receiving Vancomycin IV with management by the Pharmacy Consult service  Assessment/Plan:  The patient was reviewed  Patient is on PD and random level this morning was less than 20  Will administer a 15mg/kg dose today  Check a random level with am labs tomorrow  Re dose when level is less than 20  We will continue to follow the patients culture results and clinical progress daily      Angel Clements, Pharmacist

## 2020-08-05 NOTE — PROGRESS NOTES
Progress Note - Infectious Disease   Álvaro Mckeon 37 y o  male MRN: [id number]  Unit/Bed#: S -01 Encounter: [id number]      A/P:  1  Sepsis, POA  Improved  2  Peritonitis   Secondary to Enterococcus  3  ESRD on PD   4  Diabetes mellitus type 1    5  Diarrheal illness  6 Prior episode of PD catheter associated peritonitis in 2020 secondary to Staph epi  7  Vomiting        Peritoneal fluid culture shows broth only Enterococcus   Consider bacterial translocation from the gut in the setting of recent diarrheal illness   Prior peritonitis was secondary to Staph epidermidis   As this is a different organism and suspicion for PD associated peritonitis is low, we can attempt to preserve PD catheter  Repeat peritoneal fluid WBC count was much improved  Continue IV vancomycin  Ultimate plan to transition to outpatient intraperitoneal vancomycin to complete 2 week course, through   Plan for EGD tomorrow noted  Will follow up findings  I discussed above plan with patient and mother at bedside, and with Elva Cassidy from nephrology service  Subjective:  Last episode of vomiting was last evening  He says he feels overall better  No fevers or chills  Abdominal pain has improved  Stools are still more loose but less frequent  Tolerating oral intake  Objective:  Vitals:  Temp:  [98 5 °F (36 9 °C)-98 8 °F (37 1 °C)] 98 7 °F (37 1 °C)  HR:  [100-103] 103  Resp:  [18] 18  BP: (187-226)/() 187/86  SpO2:  [96 %-98 %] 96 %  Temp (24hrs), Av 7 °F (37 1 °C), Min:98 5 °F (36 9 °C), Max:98 8 °F (37 1 °C)  Current: Temperature: 98 7 °F (37 1 °C)    Physical Exam:   General:  No acute distress  Psychiatric:  Awake and alert  Pulmonary:  Normal respiratory excursion without accessory muscle use  Abdomen:  Soft, nontender, PD catheter site is clean  Extremities:  No edema  Skin:  No rashes    Lab Results:  I have personally reviewed pertinent labs    Results from last 7 days   Lab Units 08/05/20  6467 08/04/20  0032 08/03/20  0528  08/01/20  0308 07/31/20  1901 07/30/20  1454   POTASSIUM mmol/L 3 8 3 9 4 1   < > 5 1 5 3 5 6*   CHLORIDE mmol/L 100 99* 99*   < > 101 99* 98*   CO2 mmol/L 32 31 30   < > 28 31 31   BUN mg/dL 31* 36* 39*   < > 37* 36* 37*   CREATININE mg/dL 13 04* 12 96* 12 96*   < > 11 88* 11 89* 12 41*   EGFR ml/min/1 73sq m 4 4 4   < > 5 5 5   CALCIUM mg/dL 9 5 9 4 9 1   < > 9 0 8 6 9 2   AST U/L  --   --   --   --  15 16 17   ALT U/L  --   --   --   --  15 22 26   ALK PHOS U/L  --   --   --   --  65 66 68    < > = values in this interval not displayed  Results from last 7 days   Lab Units 08/05/20  0816 08/04/20  0617 08/03/20  0528   WBC Thousand/uL 5 60 4 73 5 96   HEMOGLOBIN g/dL 8 5* 7 8* 8 1*   PLATELETS Thousands/uL 282 257 261     Results from last 7 days   Lab Units 08/01/20  1127 07/31/20  2346 07/31/20  2000 07/31/20  1913 07/31/20  1902 07/30/20  1647 07/30/20  1501 07/30/20  1454   BLOOD CULTURE   --   --   --  No Growth After 4 Days  No Growth After 4 Days  --  No Growth After 5 Days  No Growth After 5 Days  GRAM STAIN RESULT  2+ Polys  No bacteria seen  --  3+ Polys  No bacteria seen  --   --   --   --   --    BODY FLUID CULTURE, STERILE  No growth  --  Growth in Broth culture only Enterococcus faecalis*  --   --   --   --   --    MRSA CULTURE ONLY   --  No Methicillin Resistant Staphlyococcus aureus (MRSA) isolated  --   --   --   --   --   --    C DIFF TOXIN B   --   --   --   --   --  Negative  --   --        Imaging Studies:   I have personally reviewed pertinent imaging study reports and images in PACS  EKG, Pathology, and Other Studies:   I have personally reviewed pertinent reports

## 2020-08-05 NOTE — ASSESSMENT & PLAN NOTE
· Patient with peritoneal dialysis catheter in place  · Creatinine baseline from 12-14     · Nephrology onboard  · Per nephro and ID catheter is to remain in place and pt treated with abx - will continue with IV vanco for now and on d/c will then transition to outpt treatment through 8/13

## 2020-08-05 NOTE — CONSULTS
Consultation - [address] [facility]  Sujata Slade 37 y o  male MRN: [id number]  Unit/Bed#: S -01 Encounter: [id number]         Reason for Consult / Principal Problem:  Gastroparesis    HPI:  Olive Faust is a 58-year-old male with history of type 1 diabetes, end-stage renal disease on PD, previous CVA and gastroparesis  Patient is currently admitted for peritonitis secondary to Enterococcus  Patient being followed by infectious disease, and he is currently on IV vancomycin  Patient was planned to have outpatient EGD tomorrow for gastroparesis  Patient reports that while he has been in patient, Reglan has been helping his symptoms  He reports that since he also skip to eating late night snacks, his symptoms have been slightly improved at home prior to being admitted  He denies any signs of overt GI bleeding  His stool studies were negative as an inpatient, as he also was experiencing diarrhea prior to coming here  It has been suspected that his peritonitis secondary to bacterial translocation  Patient's last EGD was in April last year  LA class B esophagitis noted  A large amount of gastric contents noted, raising the suspicion for gastroparesis initially at that time  Moderate gastritis and duodenitis noted as well  Review of Systems:    CONSTITUTIONAL: Denies any fever, chills, or rigors  Good appetite, and no recent weight loss  HEENT: No earache or tinnitus  Denies hearing loss or visual disturbances  CARDIOVASCULAR: No chest pain or palpitations  RESPIRATORY: Denies any cough, hemoptysis, shortness of breath or dyspnea on exertion  GASTROINTESTINAL: As noted in the History of Present Illness  GENITOURINARY: No problems with urination  Denies any hematuria or dysuria  NEUROLOGIC: No dizziness or vertigo, denies headaches  MUSCULOSKELETAL: Denies any muscle or joint pain  SKIN: Denies skin rashes or itching     ENDOCRINE: Denies excessive thirst  Denies intolerance to heat or cold  PSYCHOSOCIAL: Denies depression or anxiety  Denies any recent memory loss  Historical Information   Past Medical History:   Diagnosis Date    [facility] kidney injury ([facility] )     Anxiety     Cerebellar stroke side undetermined 2015,2018    Diabetes type 1, controlled ([facility] )     IDDM    Diarrhea     Gastroparesis     History of shingles 2010    History of transfusion 2018    Hypertension     Muscle weakness     general unsteadiness    Retinopathy      Past Surgical History:   Procedure Laterality Date    CARDIAC LOOP RECORDER  2018    EGD      EYE SURGERY      PERITONEAL CATHETER INSERTION N/A 2018    Procedure: UNROOF PD CATHETER;  Surgeon: Curtis Oneill DO;  [facility];  Service: General    TN ESOPHAGOGASTRODUODENOSCOPY TRANSORAL DIAGNOSTIC N/A 2019    Procedure: ESOPHAGOGASTRODUODENOSCOPY (EGD); Surgeon: Alireza Negro MD;  [facility];   Service: Gastroenterology    TN LAP INSERTION TUNNELED INTRAPERITONEAL CATHETER N/A 2018    Procedure: LAPAROSCOPIC PD CATHETER PLACEMENT;  Surgeon: Curtis Oneill DO;  [facility];  Service: General    TONSILLECTOMY       Social History   Social History     Substance and Sexual Activity   Alcohol Use Not Currently    Comment: rarely     Social History     Substance and Sexual Activity   Drug Use Yes    Frequency: 5 0 times per week    Types: Marijuana    Comment: medical     Social History     Tobacco Use   Smoking Status Former Smoker    Packs/day: 0 50    Years: 12 00    Pack years: 6 00    Types: Cigarettes    Last attempt to quit: 2018    Years since quittin 5   Smokeless Tobacco Former User   Tobacco Comment    quit 2018     Family History   Problem Relation Age of Onset    Breast cancer Mother     Hypertension Mother     Hyperlipidemia Father     Hypertension Father     Leukemia Maternal Grandmother     Hyperlipidemia Maternal Grandfather     Hypertension Maternal Grandfather     Hyperlipidemia Paternal Grandmother     Hypertension Paternal Grandmother     Heart disease Paternal Grandfather         cardiac disorder    Diabetes Paternal Grandfather         Meds/Allergies     Current Facility-Administered Medications   Medication Dose Route Frequency    acetaminophen (TYLENOL) tablet 650 mg  650 mg Oral Q6H PRN    aspirin chewable tablet 81 mg  81 mg Oral Daily    atorvastatin (LIPITOR) tablet 40 mg  40 mg Oral HS    calcium acetate (PHOSLO) capsule 667 mg  667 mg Oral TID With Meals    cholecalciferol (VITAMIN D3) tablet 2,000 Units  2,000 Units Oral Daily    cinacalcet (SENSIPAR) tablet 60 mg  60 mg Oral Every Other Day    cloNIDine (CATAPRES) tablet 0 1 mg  0 1 mg Oral BID PRN    divalproex sodium (DEPAKOTE ER) 24 hr tablet 500 mg  500 mg Oral Daily    doxazosin (CARDURA) tablet 2 mg  2 mg Oral HS    escitalopram (LEXAPRO) tablet 10 mg  10 mg Oral Daily    famotidine (PEPCID) tablet 10 mg  10 mg Oral Daily    gabapentin (NEURONTIN) capsule 100 mg  100 mg Oral HS    gentamicin (GARAMYCIN) 0 1 % ointment   Topical Daily    heparin (porcine) subcutaneous injection 5,000 Units  5,000 Units Subcutaneous Q8H [address]    hydrALAZINE (APRESOLINE) injection 10 mg  10 mg Intravenous Q4H PRN    hydrALAZINE (APRESOLINE) tablet 50 mg  50 mg Oral Q8H [address]    insulin glargine (LANTUS) subcutaneous injection 5 Units 0 05 mL  5 Units Subcutaneous After Breakfast    insulin lispro (HumaLOG) 100 units/mL subcutaneous injection 1-5 Units  1-5 Units Subcutaneous HS    insulin lispro (HumaLOG) 100 units/mL subcutaneous injection 1-6 Units  1-6 Units Subcutaneous TID AC    insulin lispro (HumaLOG) 100 units/mL subcutaneous injection 2 Units  2 Units Subcutaneous Daily With Lunch    insulin lispro (HumaLOG) 100 units/mL subcutaneous injection 4 Units  4 Units Subcutaneous Daily With Breakfast    insulin lispro (HumaLOG) 100 units/mL subcutaneous injection 4 Units  4 Units Subcutaneous Daily With Dinner    insulin NPH (HumuLIN N,NovoLIN N) 100 Units/mL subcutaneous injection 5 Units  5 Units Subcutaneous HS    labetalol (NORMODYNE) tablet 300 mg  300 mg Oral Q12H [facility]    lisinopril (ZESTRIL) tablet 40 mg  40 mg Oral Daily    metoclopramide (REGLAN) tablet 5 mg  5 mg Oral 4x Daily (AC & HS)    NIFEdipine (PROCARDIA XL) 24 hr tablet 60 mg  60 mg Oral BID    ondansetron (ZOFRAN) 8 mg in sodium chloride 0 9 % 50 mL IVPB  8 mg Intravenous Q6H PRN    vancomycin (VANCOCIN) 1,250 mg in sodium chloride 0 9 % 250 mL IVPB  15 mg/kg (Adjusted) Intravenous Daily PRN       Allergies   Allergen Reactions    Sulfa Antibiotics Rash         Objective     Blood pressure (!) 187/86, pulse 103, temperature 98 7 °F (37 1 °C), temperature source Oral, resp  rate 18, height 5' 10" (1 778 m), weight 97 4 kg (214 lb 12 8 oz), SpO2 96 %  Intake/Output Summary (Last 24 hours) at 8/5/2020 1358  Last data filed at 8/4/2020 2000  Gross per 24 hour   Intake 240 ml   Output 0 ml   Net 240 ml         PHYSICAL EXAM:      General Appearance:   Alert and oriented x 3  Cooperative, and in no respiratory distress   HEENT:   Normocephalic, atraumatic, anicteric      Neck:  Supple, symmetrical, trachea midline   Lungs:   Clear to auscultation bilaterally; no rales, rhonchi or wheezing; respirations unlabored    Heart[de-identified]   S1 and S2 normal; regular rate and rhythm; no murmur, rub, or gallop     Abdomen:   Soft, non-tender, non-distended; normal bowel sounds; no masses, no organomegaly    Genitalia:   Deferred    Rectal:   Deferred    Extremities:  No cyanosis, clubbing or edema    Pulses:  2+ and symmetric all extremities    Skin:  Skin color, texture, turgor normal, no rashes or lesions    Lymph nodes:  No palpable cervical or supraclavicular lymphadenopathy        Lab Results:   Results from last 7 days   Lab Units 08/05/20  0816   WBC Thousand/uL 5 60   HEMOGLOBIN g/dL 8 5*   HEMATOCRIT % 25 3*   PLATELETS Thousands/uL 282   NEUTROS PCT % 53   LYMPHS PCT % 28   MONOS PCT % 9   EOS PCT % 8*     Results from last 7 days   Lab Units 08/05/20  0816  08/01/20  0308   POTASSIUM mmol/L 3 8   < > 5 1   CHLORIDE mmol/L 100   < > 101   CO2 mmol/L 32   < > 28   BUN mg/dL 31*   < > 37*   CREATININE mg/dL 13 04*   < > 11 88*   CALCIUM mg/dL 9 5   < > 9 0   ALK PHOS U/L  --   --  65   ALT U/L  --   --  15   AST U/L  --   --  15    < > = values in this interval not displayed  Results from last 7 days   Lab Units 07/31/20  1901   LIPASE u/L 48*       Imaging Studies: I have personally reviewed pertinent imaging studies  Ct Abdomen Pelvis Wo Contrast    Result Date: 7/31/2020  Impression: Wall thickening of the proximal sigmoid colon and distal descending colon could relate to underdistention or colitis  Moderate amount of abdominal and pelvic ascites with left-sided catheter terminating in the medial pelvis  Left lower lobe pulmonary nodules measuring up to 9 mm  This is minimally increased in size compared to the prior exams dating back to 2018  Follow-up recommended in 12 months  Trace pericardial effusion  Small hiatal hernia  Diffuse bladder wall thickening could relate to underdistention or cystitis, correlate with urinalysis  Workstation performed: [id number]       ASSESSMENT and PLAN:      1) Gastroparesis secondary to diabetes - Patient was due to have EGD with Botox to the pylorus tomorrow as an outpatient  We were contacted by the primary service to consider doing this in patient prior to discharge tomorrow  We went over risks versus benefits of the procedure again   - We will plan for EGD with Botox tomorrow  - Reglan as needed    The patient was seen and examined by Dr Beti Durbin, all baez medical decisions were made with Dr Beti Durbin  Thank you for allowing us to participate in the care of this pleasant patient  We will follow up with you closely

## 2020-08-05 NOTE — ASSESSMENT & PLAN NOTE
Patient has chronic anemia  Hgb on AM labs was 7 8  Consider transfusing if hemoglobin less than 7, however given that the patient is on the transplant list at [facility], the transfusion of blood could prevent him from getting transplanted  Discuss with patient and family before and transfuse patient if critically needed, and consider repeating Hgb/Hct before administartion    Consent obtained  Patient is due for epogen tomorrow, will receive it as an inpatient if he is still here

## 2020-08-05 NOTE — ASSESSMENT & PLAN NOTE
· Patient with hypertensive urgency with BP max of 222/93 in the emergency department    · Currently managed with Doxazosin 2mg, Hydralazine 50mg BID, Labetalol 300mg Q12H, Lisinopril 40mg daily, Nifedipine 60mg BID and Torsemide 100mg BID  · Clonidine PRN  · Hydralazine increased to TID  · Continue to Monitor BP  · Pt patient is chronically hypertensive    Vitals:    08/04/20 2216 08/04/20 2230 08/05/20 0559 08/05/20 0747   BP:   (!) 190/96 (!) 187/86   BP Location:    Right arm   Pulse: 102 101  103   Resp:  18  18   Temp:  98 5 °F (36 9 °C)  98 7 °F (37 1 °C)   TempSrc:  Oral  Oral   SpO2:  96%  96%   Weight:       Height:

## 2020-08-05 NOTE — PROGRESS NOTES
Progress Note Viri Cazares 1985, 37 y o  male MRN: [id number]    Unit/Bed#: S -01 Encounter: [id number]    Primary Care Provider: Umair Rangel DO   Date and time admitted to hospital: 7/31/2020  5:07 PM        Hypertensive urgency  Assessment & Plan  · Patient with hypertensive urgency with BP max of 222/93 in the emergency department  · Currently managed with Doxazosin 2mg, Hydralazine 50mg BID, Labetalol 300mg Q12H, Lisinopril 40mg daily, Nifedipine 60mg BID and Torsemide 100mg BID  · Clonidine PRN  · Hydralazine increased to TID  · Continue to Monitor BP  · Pt patient is chronically hypertensive    Vitals:    08/04/20 2216 08/04/20 2230 08/05/20 0559 08/05/20 0747   BP:   (!) 190/96 (!) 187/86   BP Location:    Right arm   Pulse: 102 101  103   Resp:  18  18   Temp:  98 5 °F (36 9 °C)  98 7 °F (37 1 °C)   TempSrc:  Oral  Oral   SpO2:  96%  96%   Weight:       Height:             Gastroparesis diabeticorum (HCC)  Assessment & Plan  Followed as an outpt by Dr Vinicius Rich  EGD scheduled for tomorrow 8/6, will message GI to see if can be done as inpatient  Pt reports that he is improving with addition of Reglan yesterday TID before meals, only had one episode of N/V since starting it  A1c 6 8 - 6/21/2020    Plan  - Contact GI regarding EGD  - Continue with Reglan TID before meals and Zofran PRN    Recent Labs     08/04/20  1128 08/04/20  1551 08/04/20  2048 08/05/20  0749   POCGLU 247* 211* 172* 204*       Peritoneal dialysis catheter in place [facility])  Assessment & Plan  · Patient with peritoneal dialysis catheter in place  · Creatinine baseline from 12-14     · Nephrology onboard  · Per nephro and ID catheter is to remain in place and pt treated with abx - will continue with IV vanco for now and on d/c will then transition to outpt treatment through 8/13    Anemia  Assessment & Plan  Patient has chronic anemia  Hgb on AM labs was 7 8  Consider transfusing if hemoglobin less than 7, however given that the patient is on the transplant list at [facility], the transfusion of blood could prevent him from getting transplanted  Discuss with patient and family before and transfuse patient if critically needed, and consider repeating Hgb/Hct before administartion  Consent obtained  Patient is due for epogen tomorrow, will receive it as an inpatient if he is still here    History of lacunar cerebrovascular accident (CVA)  Assessment & Plan  · Continue ASA, and statin  · Continue BP meds with the addition of Clonidine    Type 1 diabetes mellitus with diabetic gastropathy [facility])  Assessment & Plan  Lab Results   Component Value Date    HGBA1C 6 8 (H) 06/21/2020       Recent Labs     08/04/20  1128 08/04/20  1551 08/04/20  2048 08/05/20  0749   POCGLU 247* 211* 172* 204*       Blood Sugar Average: Last 72 hrs:  (P) 915 [id number]   · 30 year hx of type 1 diabetes with history of inconsistent sugars  Diagnosed at 1years old  · 1 dose of metoclopramide was given 07/31/2020  · Continue home medications including Lantus 5 units after breakfast   · Continue Humalog 4 units with breakfast, 2 units with lunch, 4 units with dinner  · Continue Novolin 4 units HS  · Sliding scale insulin  · Accu-Cheks q i d  [facility] * Peritonitis ([facility] [address] )  Assessment & Plan  · Presents with abdominal pain and rigors  Patient has been admitted for peritonitis in the past and was recently discharged dignosed with staph epidermis peritonitis and sent with a course of intraperitoneal Vancomycin for 14 days  · CT scan showed Wall thickening of the proximal sigmoid colon and distal descending colon could relate to underdistention or colitis  Moderate amount of abdominal and pelvic ascites with left-sided catheter terminating in the medial pelvis   C diff negative on 7/30   · Body cultures preliminary results from 1 culture showed enterococci  · Follow body cultures   · Consulted ID and nephrology   · Follow WBC and VS - no leukocytosis noted on labs today  · Follow fecal calprotectin  · Awaiting results for stool enteric pathogens  · Per nephro and ID catheter is to remain in place and pt treated with abx  · We will defer to ID and nephrology for discharge plan      VTE Pharmacologic Prophylaxis:   Pharmacologic: Enoxaparin (Lovenox)  Mechanical VTE Prophylaxis in Place: Yes    Discussions with Specialists or Other Care Team Provider: ID, Nephro, GI    Education and Discussions with Family / Patient: Spoke with patient    Current Length of Stay: 5 day(s)    Current Patient Status: Inpatient     Discharge Plan / Estimated Discharge Date: Continue inpatient treatment for now, possible D/C tomorow    Code Status: Level 1 - Full Code      Subjective:   Patient seen and examined  No acute events overnight  The patient reports that his n/v has improved since starting reglan TID before meals with zofran PRN  Patient has an EGD scheduled tomorrow with GI as an outpatient, we have reached out to GI to find out if he is able to have this procedure done as an inpatient, but if not we will discharge him prior to his appointment tomorrow  Objective:     Vitals:   Temp (24hrs), Av 7 °F (37 1 °C), Min:98 5 °F (36 9 °C), Max:98 8 °F (37 1 °C)    Temp:  [98 5 °F (36 9 °C)-98 8 °F (37 1 °C)] 98 7 °F (37 1 °C)  HR:  [100-103] 103  Resp:  [18] 18  BP: (187-226)/() 187/86  SpO2:  [96 %-98 %] 96 %  Body mass index is 30 82 kg/m²  Input and Output Summary (last 24 hours): Intake/Output Summary (Last 24 hours) at 2020 1005  Last data filed at 2020  Gross per 24 hour   Intake 240 ml   Output 0 ml   Net 240 ml       Physical Exam:     Physical Exam   Constitutional: He is oriented to person, place, and time  He appears well-developed and well-nourished  No distress  HENT:   Head: Normocephalic and atraumatic  Nose: Nose normal    Eyes: Pupils are equal, round, and reactive to light   Conjunctivae and EOM are normal    Cardiovascular: Normal rate and regular rhythm  Exam reveals no gallop and no friction rub  No murmur heard  Pulmonary/Chest: Effort normal and breath sounds normal  No respiratory distress  Abdominal: Soft  Bowel sounds are normal  He exhibits no distension  There is no abdominal tenderness  Neurological: He is alert and oriented to person, place, and time  Skin: Skin is warm and dry  He is not diaphoretic  Psychiatric: He has a normal mood and affect  His behavior is normal  Judgment and thought content normal      Additional Data:     Labs: I have personally reviewed pertinent reports  Results from last 7 days   Lab Units 08/05/20  0816 08/04/20  0617 08/03/20  0528  08/01/20  0308   WBC Thousand/uL 5 60 4 73 5 96   < > 12 99*   HEMOGLOBIN g/dL 8 5* 7 8* 8 1*   < > 7 8*   HEMATOCRIT % 25 3* 23 8* 25 1*   < > 24 4*   PLATELETS Thousands/uL 282 257 261   < > 244  241   NEUTROS PCT % 53  --  62  --  82*   MONOS PCT % 9  --  8  --  5    < > = values in this interval not displayed  Results from last 7 days   Lab Units 08/05/20  0816 08/04/20  0032 08/03/20  0528  08/01/20  0308 07/31/20  1901 07/30/20  1454   POTASSIUM mmol/L 3 8 3 9 4 1   < > 5 1 5 3 5 6*   CHLORIDE mmol/L 100 99* 99*   < > 101 99* 98*   CO2 mmol/L 32 31 30   < > 28 31 31   BUN mg/dL 31* 36* 39*   < > 37* 36* 37*   CREATININE mg/dL 13 04* 12 96* 12 96*   < > 11 88* 11 89* 12 41*   CALCIUM mg/dL 9 5 9 4 9 1   < > 9 0 8 6 9 2   ALK PHOS U/L  --   --   --   --  65 66 68   ALT U/L  --   --   --   --  15 22 26   AST U/L  --   --   --   --  15 16 17    < > = values in this interval not displayed  Results from last 7 days   Lab Units 08/01/20  0308   MAGNESIUM mg/dL 1 9     Results from last 7 days   Lab Units 08/02/20  0609 08/01/20  0308   PHOSPHORUS mg/dL 7 3* 5 9*          Results from last 7 days   Lab Units 07/31/20  1901   LACTIC ACID mmol/L 1 1           * Additional Pertinent Lab Tests Reviewed:  Ellen [address] Admission Reviewed    Radiology Results: I have personally reviewed pertinent reports  Ct Abdomen Pelvis Wo Contrast    Result Date: 7/31/2020  Impression: Wall thickening of the proximal sigmoid colon and distal descending colon could relate to underdistention or colitis  Moderate amount of abdominal and pelvic ascites with left-sided catheter terminating in the medial pelvis  Left lower lobe pulmonary nodules measuring up to 9 mm  This is minimally increased in size compared to the prior exams dating back to 2018  Follow-up recommended in 12 months  Trace pericardial effusion  Small hiatal hernia  Diffuse bladder wall thickening could relate to underdistention or cystitis, correlate with urinalysis  Workstation performed: [id number]       Recent Cultures (last 7 days):     Results from last 7 days   Lab Units 08/01/20  1127 07/31/20 2000 07/31/20  1913 07/31/20  1902 07/30/20  1647 07/30/20  1501 07/30/20  1454   BLOOD CULTURE   --   --  No Growth After 4 Days  No Growth After 4 Days  --  No Growth After 5 Days  No Growth After 5 Days     GRAM STAIN RESULT  2+ Polys  No bacteria seen 3+ Polys  No bacteria seen  --   --   --   --   --    BODY FLUID CULTURE, STERILE  No growth Growth in Broth culture only Enterococcus faecalis*  --   --   --   --   --    C DIFF TOXIN B   --   --   --   --  Negative  --   --        Last 24 Hours Medication List:   acetaminophen, 650 mg, Oral, Q6H PRN, Sudarshan Womack PA-C  aspirin, 81 mg, Oral, Daily, Sudarshan Womack PA-C  atorvastatin, 40 mg, Oral, HS, Sudarshan Womack PA-C  calcium acetate, 667 mg, Oral, TID With Meals, Sudarshan Womack PA-C  cholecalciferol, 2,000 Units, Oral, Daily, Sudarshan Womack PA-C  cinacalcet, 60 mg, Oral, Every Other Day, Sudarshan Womack PA-C  cloNIDine, 0 1 mg, Oral, BID PRN, Jazmin Lopez MD  divalproex sodium, 500 mg, Oral, Daily, Sudarshan Womack PA-C  doxazosin, 2 mg, Oral, HS, Sudarshan Womack PA-C  escitalopram, 10 mg, Oral, Daily, Sudarshan Womack SAV  famotidine, 10 mg, Oral, Daily, AURELIANO Valdez-C  gabapentin, 100 mg, Oral, HS, AURELIANO Valdez-CLOTILDE  gentamicin, , Topical, Daily, Filippo Godoy MD  heparin (porcine), 5,000 Units, Subcutaneous, Q8H [address], AURELIANO Valdez-C  hydrALAZINE, 10 mg, Intravenous, Q4H PRN, Raj Fox MD  hydrALAZINE, 50 mg, Oral, Q8H HALIE, Madhav Pillai DO  insulin glargine, 5 Units, Subcutaneous, After Breakfast, AURELIANO Valdez-C  insulin lispro, 1-5 Units, Subcutaneous, HS, Sudarshan Womack PA-C  insulin lispro, 1-6 Units, Subcutaneous, TID AC, AURELIANO Valdez-C  insulin lispro, 2 Units, Subcutaneous, Daily With Lunch, AURELIANO Valdez-C  insulin lispro, 4 Units, Subcutaneous, Daily With Breakfast, Sudarshan Womack PA-C  insulin lispro, 4 Units, Subcutaneous, Daily With Dinner, AURELIANO Valdez-C  insulin NPH, 5 Units, Subcutaneous, HS, AURELIANO Valdez-CLOTILDE  labetalol, 300 mg, Oral, Q12H [address], AURELIANO Valdez-CLOTILDE  lisinopril, 40 mg, Oral, Daily, AURELIANO Valdez-C  metoclopramide, 5 mg, Intravenous, TID AC, Madhav Pillai DO  NIFEdipine ER, 60 mg, Oral, BID, AURELIANO Valdez-CLOTILDE  ondansetron, 8 mg, Intravenous, Q6H PRN, Raj Fox MD, Last Rate: 8 mg (08/04/20 2300)  vancomycin, 15 mg/kg (Adjusted), Intravenous, Daily PRN, Wesley Melo MD  vancomycin, 15 mg/kg (Adjusted), Intravenous, Once, Owen Amin MD         Today, Patient Was Seen By: Taylor Blevins, [address] [place] [address]  Internal Medicine Residency PGY-1    Portions of the record may have been created with voice recognition software  Occasional wrong word or "sound a like" substitutions may have occurred due to the inherent limitations of voice recognition software  Read the chart carefully and recognize, using context, where substitutions have occurred

## 2020-08-05 NOTE — SOCIAL WORK
LOS 5 days  Patient is not bundle or a readmission  CM met with patient and mother at bedside this am to discuss dcp  Patient lives with his parents in a 2 story home with 2 NRIU in [facility]  His bedroom is on the 2nd floor and patietn states no difficulty navigating stairs  Patient uses a straight cane to ambulate outside of home and furniture walks in the home  Patient also has a shower chair  Patient is on nocturnal peritoneal dialysis and follows with [facility]  He has had [facility] in the past but does not recall which agency  He has been to [facility] and [facility] in the past for rehab  Bonny denies hx MI/MIRANDA/ETOH hx with the exception of binge drinking in college  He has no POA/LW and declines info  Patient is on SSD and his parents transport him  Parents will transport home at discharge  Patient will need peritoneal abx at d/c  CM s/w Tamra Vazquez who states that the process is they her dept lets the dialysis center know and they set up the abx with the patient  At thsi time, patient states no other CM needs  CM reviewed discharge planning process including the following: identifying caregivers at home, preference for d/c planning needs,   availability of [facility] program, availability of treatment team to discuss questions or concerns patient and/or family may have regarding diagnosis, plan of care, old or new medications and discharge planning   CM will continue to follow for care coordination and update assessment as appropriate

## 2020-08-06 ENCOUNTER — ANESTHESIA (INPATIENT)
Dept: GASTROENTEROLOGY | Facility: HOSPITAL | Age: 35
DRG: 919 | End: 2020-08-06
Payer: MEDICARE

## 2020-08-06 ENCOUNTER — ANESTHESIA EVENT (INPATIENT)
Dept: GASTROENTEROLOGY | Facility: HOSPITAL | Age: 35
DRG: 919 | End: 2020-08-06
Payer: MEDICARE

## 2020-08-06 ENCOUNTER — APPOINTMENT (INPATIENT)
Dept: GASTROENTEROLOGY | Facility: HOSPITAL | Age: 35
DRG: 919 | End: 2020-08-06
Payer: MEDICARE

## 2020-08-06 VITALS
OXYGEN SATURATION: 99 % | HEIGHT: 70 IN | RESPIRATION RATE: 18 BRPM | TEMPERATURE: 98.1 F | HEART RATE: 97 BPM | WEIGHT: 214 LBS | SYSTOLIC BLOOD PRESSURE: 189 MMHG | DIASTOLIC BLOOD PRESSURE: 85 MMHG | BODY MASS INDEX: 30.64 KG/M2

## 2020-08-06 LAB
ANION GAP SERPL CALCULATED.3IONS-SCNC: 11 MMOL/L (ref 4–13)
APPEARANCE FLD: CLEAR
BACTERIA BLD CULT: NORMAL
BACTERIA BLD CULT: NORMAL
BASOPHILS # BLD AUTO: 0.05 THOUSANDS/ΜL (ref 0–0.1)
BASOPHILS NFR BLD AUTO: 1 % (ref 0–1)
BUN SERPL-MCNC: 29 MG/DL (ref 5–25)
CALCIUM SERPL-MCNC: 9 MG/DL (ref 8.3–10.1)
CHLORIDE SERPL-SCNC: 96 MMOL/L (ref 100–108)
CO2 SERPL-SCNC: 32 MMOL/L (ref 21–32)
COLOR FLD: COLORLESS
CREAT SERPL-MCNC: 12.88 MG/DL (ref 0.6–1.3)
EOSINOPHIL # BLD AUTO: 0.36 THOUSAND/ΜL (ref 0–0.61)
EOSINOPHIL NFR BLD AUTO: 7 % (ref 0–6)
ERYTHROCYTE [DISTWIDTH] IN BLOOD BY AUTOMATED COUNT: 14.2 % (ref 11.6–15.1)
GFR SERPL CREATININE-BSD FRML MDRD: 4 ML/MIN/1.73SQ M
GLUCOSE SERPL-MCNC: 213 MG/DL (ref 65–140)
GLUCOSE SERPL-MCNC: 264 MG/DL (ref 65–140)
GLUCOSE SERPL-MCNC: 274 MG/DL (ref 65–140)
GLUCOSE SERPL-MCNC: 307 MG/DL (ref 65–140)
GLUCOSE SERPL-MCNC: 310 MG/DL (ref 65–140)
HCT VFR BLD AUTO: 25 % (ref 36.5–49.3)
HGB BLD-MCNC: 8.3 G/DL (ref 12–17)
IMM GRANULOCYTES # BLD AUTO: 0.03 THOUSAND/UL (ref 0–0.2)
IMM GRANULOCYTES NFR BLD AUTO: 1 % (ref 0–2)
LYMPHOCYTES # BLD AUTO: 1.23 THOUSANDS/ΜL (ref 0.6–4.47)
LYMPHOCYTES NFR BLD AUTO: 24 % (ref 14–44)
LYMPHOCYTES NFR BLD AUTO: 82 %
MCH RBC QN AUTO: 31.9 PG (ref 26.8–34.3)
MCHC RBC AUTO-ENTMCNC: 33.2 G/DL (ref 31.4–37.4)
MCV RBC AUTO: 96 FL (ref 82–98)
MONOCYTES # BLD AUTO: 0.5 THOUSAND/ΜL (ref 0.17–1.22)
MONOCYTES NFR BLD AUTO: 10 % (ref 4–12)
NEUTROPHILS # BLD AUTO: 3.02 THOUSANDS/ΜL (ref 1.85–7.62)
NEUTS BAND NFR FLD MANUAL: 16 %
NEUTS SEG NFR BLD AUTO: 2 %
NEUTS SEG NFR BLD AUTO: 57 % (ref 43–75)
NRBC BLD AUTO-RTO: 0 /100 WBCS
PLATELET # BLD AUTO: 273 THOUSANDS/UL (ref 149–390)
PMV BLD AUTO: 9.9 FL (ref 8.9–12.7)
POTASSIUM SERPL-SCNC: 3.7 MMOL/L (ref 3.5–5.3)
RBC # BLD AUTO: 2.6 MILLION/UL (ref 3.88–5.62)
SODIUM SERPL-SCNC: 139 MMOL/L (ref 136–145)
TOTAL CELLS COUNTED SPEC: 100
VANCOMYCIN SERPL-MCNC: 29.7 UG/ML
WBC # BLD AUTO: 5.19 THOUSAND/UL (ref 4.31–10.16)
WBC # FLD MANUAL: 7 /UL

## 2020-08-06 PROCEDURE — 43236 UPPR GI SCOPE W/SUBMUC INJ: CPT | Performed by: INTERNAL MEDICINE

## 2020-08-06 PROCEDURE — 80048 BASIC METABOLIC PNL TOTAL CA: CPT | Performed by: PSYCHIATRY & NEUROLOGY

## 2020-08-06 PROCEDURE — 85025 COMPLETE CBC W/AUTO DIFF WBC: CPT | Performed by: PSYCHIATRY & NEUROLOGY

## 2020-08-06 PROCEDURE — 3E0G8GC INTRODUCTION OF OTHER THERAPEUTIC SUBSTANCE INTO UPPER GI, VIA NATURAL OR ARTIFICIAL OPENING ENDOSCOPIC: ICD-10-PCS | Performed by: INTERNAL MEDICINE

## 2020-08-06 PROCEDURE — 88305 TISSUE EXAM BY PATHOLOGIST: CPT | Performed by: PATHOLOGY

## 2020-08-06 PROCEDURE — 99239 HOSP IP/OBS DSCHRG MGMT >30: CPT | Performed by: INTERNAL MEDICINE

## 2020-08-06 PROCEDURE — 82948 REAGENT STRIP/BLOOD GLUCOSE: CPT

## 2020-08-06 PROCEDURE — 99232 SBSQ HOSP IP/OBS MODERATE 35: CPT | Performed by: INTERNAL MEDICINE

## 2020-08-06 PROCEDURE — 43239 EGD BIOPSY SINGLE/MULTIPLE: CPT | Performed by: INTERNAL MEDICINE

## 2020-08-06 PROCEDURE — 0DB68ZX EXCISION OF STOMACH, VIA NATURAL OR ARTIFICIAL OPENING ENDOSCOPIC, DIAGNOSTIC: ICD-10-PCS | Performed by: INTERNAL MEDICINE

## 2020-08-06 PROCEDURE — 89051 BODY FLUID CELL COUNT: CPT | Performed by: INTERNAL MEDICINE

## 2020-08-06 PROCEDURE — 80202 ASSAY OF VANCOMYCIN: CPT | Performed by: INTERNAL MEDICINE

## 2020-08-06 RX ORDER — PROPOFOL 10 MG/ML
INJECTION, EMULSION INTRAVENOUS AS NEEDED
Status: DISCONTINUED | OUTPATIENT
Start: 2020-08-06 | End: 2020-08-06

## 2020-08-06 RX ORDER — SODIUM CHLORIDE 9 MG/ML
INJECTION, SOLUTION INTRAVENOUS CONTINUOUS PRN
Status: DISCONTINUED | OUTPATIENT
Start: 2020-08-06 | End: 2020-08-06

## 2020-08-06 RX ORDER — FENTANYL CITRATE 50 UG/ML
INJECTION, SOLUTION INTRAMUSCULAR; INTRAVENOUS AS NEEDED
Status: DISCONTINUED | OUTPATIENT
Start: 2020-08-06 | End: 2020-08-06

## 2020-08-06 RX ORDER — METOCLOPRAMIDE 5 MG/1
5 TABLET ORAL 2 TIMES DAILY PRN
Qty: 20 TABLET | Refills: 1 | Status: SHIPPED | OUTPATIENT
Start: 2020-08-06 | End: 2020-10-26 | Stop reason: HOSPADM

## 2020-08-06 RX ORDER — KETAMINE HYDROCHLORIDE 50 MG/ML
INJECTION, SOLUTION, CONCENTRATE INTRAMUSCULAR; INTRAVENOUS AS NEEDED
Status: DISCONTINUED | OUTPATIENT
Start: 2020-08-06 | End: 2020-08-06

## 2020-08-06 RX ORDER — FAMOTIDINE 40 MG/1
40 TABLET, FILM COATED ORAL 2 TIMES DAILY
Qty: 60 TABLET | Refills: 2 | Status: SHIPPED | OUTPATIENT
Start: 2020-08-06 | End: 2020-12-07 | Stop reason: SDUPTHER

## 2020-08-06 RX ORDER — METOCLOPRAMIDE 5 MG/1
5 TABLET ORAL 2 TIMES DAILY PRN
Qty: 20 TABLET | Refills: 0 | Status: CANCELLED | OUTPATIENT
Start: 2020-08-06

## 2020-08-06 RX ORDER — HYDRALAZINE HYDROCHLORIDE 100 MG/1
50 TABLET, FILM COATED ORAL 3 TIMES DAILY
Start: 2020-08-06 | End: 2020-11-10 | Stop reason: SDUPTHER

## 2020-08-06 RX ORDER — FAMOTIDINE 40 MG/1
40 TABLET, FILM COATED ORAL 2 TIMES DAILY
Qty: 60 TABLET | Refills: 2 | Status: CANCELLED | OUTPATIENT
Start: 2020-08-06

## 2020-08-06 RX ADMIN — PROPOFOL 50 MG: 10 INJECTION, EMULSION INTRAVENOUS at 11:23

## 2020-08-06 RX ADMIN — EPOETIN ALFA 15000 UNITS: 20000 SOLUTION INTRAVENOUS; SUBCUTANEOUS at 13:43

## 2020-08-06 RX ADMIN — METOCLOPRAMIDE HYDROCHLORIDE 5 MG: 5 TABLET ORAL at 13:42

## 2020-08-06 RX ADMIN — ONABOTULINUMTOXINA 100 UNITS: 100 INJECTION, POWDER, LYOPHILIZED, FOR SOLUTION INTRADERMAL; INTRAMUSCULAR at 11:23

## 2020-08-06 RX ADMIN — HYDRALAZINE HYDROCHLORIDE 50 MG: 25 TABLET ORAL at 13:42

## 2020-08-06 RX ADMIN — HEPARIN SODIUM 5000 UNITS: 5000 INJECTION INTRAVENOUS; SUBCUTANEOUS at 13:42

## 2020-08-06 RX ADMIN — LABETALOL HYDROCHLORIDE 300 MG: 100 TABLET, FILM COATED ORAL at 08:57

## 2020-08-06 RX ADMIN — FENTANYL CITRATE 25 MCG: 50 INJECTION INTRAMUSCULAR; INTRAVENOUS at 11:21

## 2020-08-06 RX ADMIN — METOCLOPRAMIDE HYDROCHLORIDE 5 MG: 5 TABLET ORAL at 06:02

## 2020-08-06 RX ADMIN — LISINOPRIL 40 MG: 20 TABLET ORAL at 08:57

## 2020-08-06 RX ADMIN — HYDRALAZINE HYDROCHLORIDE 50 MG: 25 TABLET ORAL at 06:02

## 2020-08-06 RX ADMIN — PROPOFOL 100 MG: 10 INJECTION, EMULSION INTRAVENOUS at 11:17

## 2020-08-06 RX ADMIN — KETAMINE HYDROCHLORIDE 30 MG: 50 INJECTION INTRAMUSCULAR; INTRAVENOUS at 11:20

## 2020-08-06 RX ADMIN — SODIUM CHLORIDE: 9 INJECTION, SOLUTION INTRAVENOUS at 11:05

## 2020-08-06 RX ADMIN — ONABOTULINUMTOXINA 100 UNITS: 100 INJECTION, POWDER, LYOPHILIZED, FOR SOLUTION INTRADERMAL; INTRAMUSCULAR at 10:00

## 2020-08-06 RX ADMIN — CALCIUM ACETATE 667 MG: 667 CAPSULE ORAL at 13:42

## 2020-08-06 RX ADMIN — PROPOFOL 50 MG: 10 INJECTION, EMULSION INTRAVENOUS at 11:19

## 2020-08-06 RX ADMIN — NIFEDIPINE 60 MG: 30 TABLET, EXTENDED RELEASE ORAL at 08:57

## 2020-08-06 NOTE — ASSESSMENT & PLAN NOTE
Lab Results   Component Value Date    HGBA1C 6 8 (H) 06/21/2020       Recent Labs     08/06/20  0202 08/06/20  0616 08/06/20  0716 08/06/20  1204   POCGLU 213* 310* 264* 274*       Blood Sugar Average: Last 72 hrs:  (P) 377 [id number]   · 30 year hx of type 1 diabetes with history of inconsistent sugars  Diagnosed at 1years old  · Continue home medications including Lantus 5 units after breakfast   · Continue Humalog 4 units with breakfast, 2 units with lunch, 4 units with dinner  · Continue Novolin 4 units HS

## 2020-08-06 NOTE — ASSESSMENT & PLAN NOTE
· Patient with hypertensive urgency with BP max of 222/93 in the emergency department    · Currently managed with Doxazosin 2mg, Hydralazine 50mg BID, Labetalol 300mg Q12H, Lisinopril 40mg daily, Nifedipine 60mg BID and Torsemide 100mg BID  · Clonidine PRN  · Hydralazine increased to TID  · Continue to Monitor BP    Vitals:    08/06/20 1132 08/06/20 1142 08/06/20 1148 08/06/20 1208   BP: (!) 179/90  (!) 180/86 (!) 189/85   BP Location:    Right arm   Pulse: 91 94 93 97   Resp: 20  18 18   Temp:    98 1 °F (36 7 °C)   TempSrc:  Temporal  Oral   SpO2: 100% 100% 100% 99%   Weight:       Height:

## 2020-08-06 NOTE — ASSESSMENT & PLAN NOTE
· Presents with abdominal pain and rigors  Patient has been admitted for peritonitis in the past and was recently discharged dignosed with staph epidermis peritonitis and sent with a course of intraperitoneal Vancomycin for 14 days  · CT scan showed Wall thickening of the proximal sigmoid colon and distal descending colon could relate to underdistention or colitis  Moderate amount of abdominal and pelvic ascites with left-sided catheter terminating in the medial pelvis   C diff negative on 7/30   · Body cultures preliminary results from 1 culture showed enterococci

## 2020-08-06 NOTE — ANESTHESIA PREPROCEDURE EVALUATION
Procedure:  EGD    Relevant Problems   CARDIO   (+) Hypertensive urgency   (+) Renovascular hypertension      ENDO   (+) Type 1 diabetes mellitus with diabetic gastropathy (HCC)      /RENAL   (+) End-stage renal disease (HCC)   (+) End-stage renal disease on peritoneal dialysis (HCC)   (+) Peritoneal dialysis catheter in place ([facility] [address] )      HEMATOLOGY   (+) Anemia   (+) Anemia in stage 3 chronic kidney disease (HCC) (Resolved)      NEURO/PSYCH   (+) Binocular visual disturbance   (+) Cerebrovascular accident (CVA) of left pontine structure ([address] ) (Resolved)   (+) Current moderate episode of major depressive disorder without prior episode ([facility])   (+) Diabetic neuropathy (HCC)   (+) Gastroparesis diabeticorum (HCC)   (+) History of lacunar cerebrovascular accident (CVA)   (+) Provoked seizure (HCC)      Other   (+) Asterixis   (+) Bilateral leg edema   (+) Dyslipidemia   (+) Environmental and seasonal allergies   (+) Esophagitis   (+) Homozygous MTHFR mutation [id number] ([facility])   (+) Impaired mobility and ADLs   (+) Incidental lung nodule, > 3mm and < 8mm   (+) Left atrial dilation   (+) Obesity (BMI 30 0-34 9)   (+) Peripheral polyneuropathy        Physical Exam    Airway    Mallampati score: II  TM Distance: >3 FB  Neck ROM: full     Dental       Cardiovascular  Rhythm: regular, Rate: normal, Cardiovascular exam normal    Pulmonary  Pulmonary exam normal     Other Findings        Anesthesia Plan  ASA Score- 3     Anesthesia Type- IV sedation with anesthesia with ASA Monitors  Additional Monitors:   Airway Plan:           Plan Factors-Exercise tolerance (METS): <4 METS  Chart reviewed  EKG reviewed  Existing labs reviewed  Patient summary reviewed  Patient is not a current smoker  Patient did not smoke on day of surgery  Induction- intravenous  Postoperative Plan-     Informed Consent- Anesthetic plan and risks discussed with patient  I personally reviewed this patient with the CRNA   Discussed and agreed on the Anesthesia Plan with the LIMA Valadez

## 2020-08-06 NOTE — ANESTHESIA POSTPROCEDURE EVALUATION
Post-Op Assessment Note    CV Status:  Stable    Pain management: adequate     Mental Status:  Alert and awake   Hydration Status:  Euvolemic   PONV Controlled:  Controlled   Airway Patency:  Patent      Post Op Vitals Reviewed: Yes      Staff: CRNA         No complications documented      BP  170/69   Temp   98 2   Pulse  74   Resp   18   SpO2   98

## 2020-08-06 NOTE — PROGRESS NOTES
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 and heterozygote for prothombin gene mutation, type 1 diabetes mellitus, end-stage renal disease on PD, recent peritonitis with Staph epi June 2020 who presents with abdominal pain       1  End-stage renal disease on peritoneal dialysis:  · Managed at [facility]  · Target weight 101 kg  · PD prescription CCPD:  Total fills 5 with 4 fills at 2200 mL and last fill 1500 mL   Various percentages of dextrose at night   1 5% day dwell which he drains at 1600 hours and refills with 2 5%   Low average transporter  · Currently on CAPD:  1 5% 2 L exchange  · No fibrin noted in PD fluid   · Plan:   ? Last exchange held due to EGD today  ? No changes to current prescription  2  Peritonitis:  · PD fluid:  ?  Initial Gram 7/31 stain Enterococcus   Repeat culture 8/1 shows no growth  ? WBC 7000 on admission-->4600-->527-->203-->127  ? Neutrophil count:  71% on admission with subsequent counts:  46%-->6%-->4%-->2%   · External contamination vs gut translocation leading to peritonitis  · CT scan thickened proximal sigmoid colon-prolonged course of diarrhea  · Recent peritonitis with Staph epi June 2020 treated with vancomycin   Surveillance cultures on July 23rd negative/no growth  · Antibiotics:  Currently on vancomycin IV   Last dose of Flagyl 8/2  ? VANCOMYCIN LEVEL 27 8-->19--> 27 7-->24 1   · Plan:  ? No plan to remove PD catheter at this time  Organism 1s different than prior and quickly cleared  ? Continue vancomycin per ID  ? At discharge will inform outpatient clinic of intraperitoneal vancomycin with plan to treat through 8/13 per ID  3   Hypertension:    · On doxazosin, hydralazine, labetalol, lisinopril, nifedipine   Also on metolazone at home which is on hold   Clonidine use p r n  · Blood pressure remains elevated-missing some doses of medication due to persistent vomiting  Hydralazine recently increased to t i d   Continue current medications and as needed clonidine  · Blood pressure remains elevated  Monitor  4  Anemia:    · Hemoglobin 8 3  Below goal  · Epogen dose 03638 units every other week  ? Last dose of Epogen on 07/23/2020   ? Goal hemoglobin 9-10 due to history of seizure/CVA  · On transplant list-if transfusion needed use leuko reduced, CMV negative blood  · Plan:  ? Epogen due today will provide dose before discharge  5  CKD MBD:    · Hyperphosphatemia- On PhosLo/lanthum, vitamin-D, Sensipar    6  Diabetes mellitus type 1:  Management per primary team  7  Vomiting:   Improved with Reglan  EGD today     DISPOSITION:  Stable from a renal standpoint  Will arrange for outpatient intraperitoneal antibiotics at discharge  SUBJECTIVE / INTERVAL HISTORY:  No acute issues or complaints at this time    Feeling better with Reglan    OBJECTIVE:  Current Weight: Weight - Scale: 97 1 kg (214 lb)  Vitals:    08/06/20 0559 08/06/20 0600 08/06/20 0700 08/06/20 0945   BP: (!) 185/88  (!) 172/85 (!) 179/90   BP Location: Right arm      Pulse: 98  98 83   Resp:   18 18   Temp:   98 °F (36 7 °C) 97 8 °F (36 6 °C)   TempSrc:   Oral Temporal   SpO2:   99% 99%   Weight:  97 5 kg (214 lb 15 2 oz)  97 1 kg (214 lb)   Height:    5' 10" (1 778 m)     No intake or output data in the 24 hours ending 08/06/20 1026  General: NAD, chronically ill-appearing  Skin: no rash  Eyes: anicteric sclera  ENT: moist mucous membrane  Neck: supple  Chest: CTA b/l, no ronchii, no wheeze, no rubs, no rales  CVS: s1s2, no murmur, no gallop, no rub  Abdomen: soft, nontender, nl sounds  Extremities: no significant edema LE b/l  : no preston  Neuro: AAOX3  Psych: normal affect  Medications:    Current Facility-Administered Medications:     acetaminophen (TYLENOL) tablet 650 mg, 650 mg, Oral, Q6H PRN, AURELIANO Valdez-C, 650 mg at 08/02/20 7992    aspirin chewable tablet 81 mg, 81 mg, Oral, Daily, AURELIANO Valdez-C, 81 mg at 08/05/20 0800    atorvastatin (LIPITOR) tablet 40 mg, 40 mg, Oral, HS, AURELIANO Valdez-C, 40 mg at 08/05/20 2130    calcium acetate (PHOSLO) capsule 667 mg, 667 mg, Oral, TID With Meals, Denise Flericki, PA-C, 667 mg at 08/05/20 1555    cholecalciferol (VITAMIN D3) tablet 2,000 Units, 2,000 Units, Oral, Daily, AURELIANO Valdez-CLOTILDE, 2,000 Units at 08/05/20 0801    cinacalcet (SENSIPAR) tablet 60 mg, 60 mg, Oral, Every Other Day, AURELIANO Valdez-C, 60 mg at 08/05/20 0800    cloNIDine (CATAPRES) tablet 0 1 mg, 0 1 mg, Oral, BID PRN, Raquel Lambert MD    divalproex sodium (DEPAKOTE ER) 24 hr tablet 500 mg, 500 mg, Oral, Daily, AURELIANO Valdez-CLOTILDE, 500 mg at 08/05/20 0800    doxazosin (CARDURA) tablet 2 mg, 2 mg, Oral, HS, AURELIANO Valdez-C, 2 mg at 08/05/20 2130    epoetin rudi (EPOGEN,PROCRIT) injection 15,000 Units, 15,000 Units, Subcutaneous, Once, [employer], DO    escitalopram (LEXAPRO) tablet 10 mg, 10 mg, Oral, Daily, AURELIANO Valdez-C, 10 mg at 08/05/20 0800    famotidine (PEPCID) tablet 10 mg, 10 mg, Oral, Daily, AURELIANO Valdez-C, 10 mg at 08/05/20 0801    gabapentin (NEURONTIN) capsule 100 mg, 100 mg, Oral, HS, AURELIANO Valdez-C, 100 mg at 08/05/20 2130    gentamicin (GARAMYCIN) 0 1 % ointment, , Topical, Daily, Ramu Krishnamurthy MD, Stopped at 08/06/20 0859    heparin (porcine) subcutaneous injection 5,000 Units, 5,000 Units, Subcutaneous, Q8H [address], 5,000 Units at 08/05/20 2131 **AND** [COMPLETED] Platelet count, , , Once, Sudarshan Womack PA-C    hydrALAZINE (APRESOLINE) injection 10 mg, 10 mg, Intravenous, Q4H PRN, Raquel Lambert MD, 10 mg at 08/04/20 1146    hydrALAZINE (APRESOLINE) tablet 50 mg, 50 mg, Oral, Q8H [address], Madhav Pillai DO, 50 mg at 08/06/20 0602    insulin glargine (LANTUS) subcutaneous injection 5 Units 0 05 mL, 5 Units, Subcutaneous, After Breakfast, Sudarshan Womack PA-C, Stopped at 08/06/20 0859    insulin lispro (HumaLOG) 100 units/mL subcutaneous injection 1-5 Units, 1-5 Units, Subcutaneous, HS, Sudarshan Womack PA-C, 2 Units at 08/05/20 2136    insulin lispro (HumaLOG) 100 units/mL subcutaneous injection 1-6 Units, 1-6 Units, Subcutaneous, TID AC, 1 Units at 08/05/20 1554 **AND** Fingerstick Glucose (POCT), , , TID AC, Sudarshan Womack PA-C    insulin lispro (HumaLOG) 100 units/mL subcutaneous injection 2 Units, 2 Units, Subcutaneous, Daily With Lunch, Sudarshan Womack PA-C, 2 Units at 08/05/20 1413    insulin lispro (HumaLOG) 100 units/mL subcutaneous injection 4 Units, 4 Units, Subcutaneous, Daily With Breakfast, Sudarshan Womack PA-C, 4 Units at 08/05/20 0802    insulin lispro (HumaLOG) 100 units/mL subcutaneous injection 4 Units, 4 Units, Subcutaneous, Daily With Mae Ripper, SAV, 4 Units at 08/05/20 1553    insulin NPH (HumuLIN N,NovoLIN N) 100 Units/mL subcutaneous injection 5 Units, 5 Units, Subcutaneous, HS, Sudarshan Womack PA-C, 5 Units at 08/05/20 2136    labetalol (NORMODYNE) tablet 300 mg, 300 mg, Oral, Q12H [address], Sudarshan Womack PA-C, 300 mg at 08/06/20 0857    lisinopril (ZESTRIL) tablet 40 mg, 40 mg, Oral, Daily, Sudarshan Womack PA-C, 40 mg at 08/06/20 0857    metoclopramide (REGLAN) tablet 5 mg, 5 mg, Oral, 4x Daily (AC & HS), Brandee Ascencio MD, 5 mg at 08/06/20 0602    NIFEdipine (PROCARDIA XL) 24 hr tablet 60 mg, 60 mg, Oral, BID, Sudarshan Womakc PA-C, 60 mg at 08/06/20 0857    onabotulinumtoxin A (BOTOX) injection 100 Units, 100 Units, Infiltration, Once, Anabel Shah PA-C    ondansetron (ZOFRAN) 8 mg in sodium chloride 0 9 % 50 mL IVPB, 8 mg, Intravenous, Q6H PRN, Travis Montgomery MD, Last Rate: 200 mL/hr at 08/05/20 2309, 8 mg at 08/05/20 2309    vancomycin (VANCOCIN) 1,250 mg in sodium chloride 0 9 % 250 mL IVPB, 15 mg/kg (Adjusted), Intravenous, Daily PRN, Rosita Hines MD    Laboratory Results:  Results from last 7 days   Lab Units 08/06/20  0613 08/05/20  7312 08/04/20  0617 08/04/20  0032 08/03/20  0528 08/02/20  0609 08/01/20  0308 07/31/20  1901   WBC Thousand/uL 5 19 5 60 4 73  --  5 96 6 15 12 99* 11 04*   HEMOGLOBIN g/dL 8 3* 8 5* 7 8*  --  8 1* 7 3* 7 8* 7 7*   HEMATOCRIT % 25 0* 25 3* 23 8*  --  25 1* 22 2* 24 4* 23 9*   PLATELETS Thousands/uL 273 282 257  --  261 225 244  241 265   POTASSIUM mmol/L 3 7 3 8  --  3 9 4 1 5 1 5 1 5 3   CHLORIDE mmol/L 96* 100  --  99* 99* 101 101 99*   CO2 mmol/L 32 32  --  31 30 28 28 31   BUN mg/dL 29* 31*  --  36* 39* 41* 37* 36*   CREATININE mg/dL 12 88* 13 04*  --  12 96* 12 96* 12 62* 11 88* 11 89*   CALCIUM mg/dL 9 0 9 5  --  9 4 9 1 8 6 9 0 8 6   MAGNESIUM mg/dL  --   --   --   --   --   --  1 9  --    PHOSPHORUS mg/dL  --   --   --   --   --  7 3* 5 9*  --

## 2020-08-06 NOTE — PROGRESS NOTES
Vancomycin IV Pharmacy-to-Dose Consultation    Alondra Lewis is a 37 y o  male who is currently receiving Vancomycin IV with management by the Pharmacy Consult service  Assessment/Plan:  The patient was reviewed  Patient on Peritoneal Dialysis  Vancomycin level this morning was >20 so patient will not get a dose today  Next random level at 0600 tomorrow  Re dose when level is less than 20  We will continue to follow the patients culture results and clinical progress daily      Dejuan Abreu, Pharmacist

## 2020-08-06 NOTE — ASSESSMENT & PLAN NOTE
Followed as an outpt by Dr Amie Roque  EGD performed today, patient had Botox administered during EGD    A1c 6 8 - 6/21/2020    Recent Labs     08/06/20  0202 08/06/20  0616 08/06/20  0716 08/06/20  1204   POCGLU 213* 310* 264* 274*

## 2020-08-06 NOTE — DISCHARGE INSTR - AVS FIRST PAGE
Dear Yissel Laboy,     It was our pleasure to care for you here at [facility]  It is our hope that we were always able to exceed the expected standards for your care during your stay  You were hospitalized due to ***  You were cared for on the *** floor by Tk De La Fuente DO under the service of [id number] [facility] MD Azael with the [facility] who covers for your primary care physician (PCP), Chelly Espinoza DO, while you were hospitalized  If you have any questions or concerns related to this hospitalization, you may contact us at [phone]  For follow up as well as any medication refills, we recommend that you follow up with your primary care physician  A registered nurse will reach out to you by phone within a few days after your discharge to answer any additional questions that you may have after going home  However, at this time we provide for you here, the most important instructions / recommendations at discharge:     · Notable Medication Adjustments -   · Zofran 3 times a day as needed  · Reglan twice a day as needed, use after Zofran if Zofran is unable to control nausea and vomiting  · Vancomycin   · Testing Required after Discharge -   · None  · Important follow up information -   · Please follow-up with Nephrology, Infectious Disease, and Gastroenterology  · Other Notes -   · You received your Epogen treatment on Thursday 8/6/2020  · Please review this entire after visit summary as additional general instructions including medication list, appointments, activity, diet, any pertinent wound care, and other additional recommendations from your care team that may be provided for you        Sincerely,     Tk De La Fuente DO

## 2020-08-06 NOTE — DISCHARGE INSTRUCTIONS
Anemia   WHAT YOU NEED TO KNOW:   Anemia is a low number of red blood cells or a low amount of hemoglobin in your red blood cells  Hemoglobin is a protein that helps carry oxygen throughout your body  Red blood cells use iron to create hemoglobin  Anemia may develop if your body does not have enough iron  It may also develop if your body does not make enough red blood cells or they die faster than your body can make them  DISCHARGE INSTRUCTIONS:   Call 911 or have someone call 911 for any of the following:   · You lose consciousness  · You have severe chest pain  Seek care immediately if:   · You have dark or bloody bowel movements  Contact your healthcare provider if:   · Your symptoms are worse, even after treatment  · You have questions or concerns about your condition or care  Medicines:   · Iron or folic acid supplements  help increase your red blood cell and hemoglobin levels  · Vitamin B12 injections  may help boost your red blood cell level and decrease your symptoms  Ask your healthcare provider how to inject B12  · Take your medicine as directed  Contact your healthcare provider if you think your medicine is not helping or if you have side effects  Tell him of her if you are allergic to any medicine  Keep a list of the medicines, vitamins, and herbs you take  Include the amounts, and when and why you take them  Bring the list or the pill bottles to follow-up visits  Carry your medicine list with you in case of an emergency  Prevent anemia:  Eat healthy foods rich in iron and vitamin C  Nuts, meat, dark leafy green vegetables, and beans are high in iron and protein  Vitamin C helps your body absorb iron  Foods rich in vitamin C include oranges and other citrus fruits  Ask your healthcare provider for a list of other foods that are high in iron or vitamin C  Ask if you need to be on a special diet     Follow up with your healthcare provider as directed:  Write down your questions so you remember to ask them during your visits  © 2017 [address] Information is for End User's use only and may not be sold, redistributed or otherwise used for commercial purposes  All illustrations and images included in CareNotes® are the copyrighted property of [employer]  or Rickie Carcamo  The above information is an  only  It is not intended as medical advice for individual conditions or treatments  Talk to your doctor, nurse or pharmacist before following any medical regimen to see if it is safe and effective for you  10% - bad control"> 10% - bad control,Hemoglobin A1c (HbA1c) greater than 10% indicating poor diabetic control,Haemoglobin A1c greater than 10% indicating poor diabetic control">   Diabetes Mellitus Type 1 in Adults, Ambulatory Care   GENERAL INFORMATION:   Diabetes mellitus type 1  is a disease that affects how your body makes insulin and uses glucose (sugar)  Insulin helps move sugar out of the blood so it can be used for energy  Common symptoms include the following:   · More thirst than usual     · Frequent urination     · Hunger most of the time     · Weight loss without trying     · Blurred vision  Seek immediate care for the following symptoms:   · Blood sugar level that is lower than directed and does not improve with treatment     · Trouble staying awake or focusing    · Shaking or sweating     · Blurred or double vision     · Fruity, sweet smell to your breath, or shallow breathing    · A heartbeat that is fast and weak  Check your blood sugar level as directed: You will be taught how to use a glucose monitor  You will need to check your blood sugar level at least 3 times each day  Ask your healthcare provider when and how often to check during the day  If you check your blood sugar level before a meal , it should be between 70 and 130 mg/dL   If you check your blood sugar level 1 to 2 hours after a meal , it should be less than 180 mg/dL  Ask your healthcare provider if these are good goals for you  You may need to check for ketones in your urine or blood if your level is higher than directed  Write down your results, and show them to your healthcare provider  He may make changes to your medicine, food, or exercise schedules  If your blood sugar level is too low: Your blood sugar level is too low if it goes below 70 mg/dL  Eat or drink a small amount of fast-acting carbohydrate, or take 4 glucose tablets (15 to 20 grams of glucose)  Check your blood sugar level again 15 minutes later  If it is above 70 mg/dL, eat a small snack  If it is still below 70 mg/dL, eat a small amount of fast-acting carbohydrate, or take 4 more glucose tablets  Your healthcare provider or dietitian can tell you which fast-acting carbohydrates to eat, and how much is safe for you  Ask your healthcare provider for more information on diabetic hypoglycemia (low blood sugar level)  Medical alert identification:  Wear medical alert jewelry or carry a card that says you have diabetes  Ask your healthcare provider where to get these items  Manage diabetes mellitus type 1:  Type 1 diabetes cannot be cured, but it can be controlled  The goal is to keep your blood sugar at a normal level  · Take your insulin as directed  Too much insulin may cause your blood sugar level to go too low  You will need at least 1 dose of insulin each day  Insulin can be injected or given through an insulin pump  Ask your healthcare provider which method is best for you  You or a family member will be taught how to give insulin injections if this is the best method for you  Your family member can give you the injections if you are not able  You will be taught how to adjust each insulin dose you take with meals   Always check your blood sugar level before the meal  The dose will be based on your blood sugar level, carbohydrates in the meal, and activity after the meal  · Check your feet each day for sores  Wear shoes and socks that fit correctly  Soak your feet in lukewarm, soapy water for 10 minutes before you cut your nails  Trim your toenails straight across to prevent ingrown toenails  Do not cut your nails into the corners or close to the skin  Do not dig under or around the nail  Ask your healthcare provider for more details about foot care  · Quit smoking  If you smoke, it is never too late to quit  Smoking can worsen the problems that can occur with diabetes  Ask your healthcare provider for information about how to stop smoking if you are having trouble quitting  · Limit alcohol  Alcohol affects your blood sugar level and can make it harder to manage your diabetes  Women should limit alcohol to 1 drink a day  Men should limit alcohol to 2 drinks a day  A drink of alcohol is 12 ounces of beer, 5 ounces of wine, or 1½ ounces of liquor  · Follow your meal plan  Your dietitian will help you create a meal plan to keep your blood sugar level steady  · Exercise as directed  Exercise can help keep your blood sugar level steady, decrease your risk of heart disease, and help you lose weight  Even a 10 to 15 pound weight loss can help you manage your blood sugar level  Exercise for at least 30 minutes, 5 days a week  Work with your healthcare provider to create an exercise plan  You may need to eat a carbohydrate snack before, during, or after you exercise  If your blood sugar level is less than 100 mg/dL, have a carbohydrate snack before you exercise  Examples are 4 to 6 crackers, ½ banana, 8 ounces (1 cup) of milk, or 4 ounces (½ cup) of juice  If your blood sugar level is higher than directed, check your blood or urine for ketones before you exercise  Do not exercise if your blood sugar level is high and you have ketones in your urine or blood  Follow up with your healthcare provider as directed    Your healthcare provider may want you to have your A1c checked every 3 months   Have your eyes checked for retinopathy every 2 years  You will need to have them checked every year if you develop retinopathy  You will also need a urine test every year to check for kidney problems  Have your feet checked every year for problems that can develop if your diabetes is not controlled  Write down your questions so you remember to ask them during your visits  CARE AGREEMENT:   You have the right to help plan your care  Learn about your health condition and how it may be treated  Discuss treatment options with your caregivers to decide what care you want to receive  You always have the right to refuse treatment  The above information is an  only  It is not intended as medical advice for individual conditions or treatments  Talk to your doctor, nurse or pharmacist before following any medical regimen to see if it is safe and effective for you  © 2014 [address] is for End User's use only and may not be sold, redistributed or otherwise used for commercial purposes  All illustrations and images included in CareNotes® are the copyrighted property of [employer]  or [employer]  Continuous Ambulatory Peritoneal Dialysis   WHAT YOU NEED TO KNOW:   What is continuous ambulatory peritoneal dialysis? Continuous ambulatory peritoneal dialysis (CAPD) is done to remove wastes, chemicals, and extra fluid from your body  During CAPD, a liquid called dialysate is put into your abdomen through a catheter (thin tube)  The dialysate pulls wastes, chemicals, and extra fluid from your blood through the peritoneum  The peritoneum is a thin lining on the inside of your abdomen  The peritoneum works like a filter as the wastes are pulled through it  The process of filling and emptying your abdomen with dialysate is called an exchange  Exchanges may be done 3 to 5 times during the day, and once during the night  Why do I need CAPD?   You may need CAPD if your kidneys are not working well, or if they have stopped working  Your kidneys remove wastes and extra fluid from your blood and leave your body through your urine  When your kidneys are damaged, they cannot remove wastes properly  This can cause serious problems in your body  You may need CAPD if you have acute (short-term) or chronic (long-term) kidney failure  During acute kidney failure, you may only need CAPD until your kidneys get better  If you have chronic kidney failure, you will need to have dialysis exchanges for the rest of your life  How is a CAPD catheter put in? A procedure will be done to place the catheter  Medicine will be given to make you relax and decrease pain  Your healthcare provider will make an incision below or beside your belly button, or just below your ribs  He will cut through your muscle and tissue to make a hole where the catheter will be placed  A catheter will be pushed into your abdomen through this hole  The end of the catheter may be placed just under your skin for 3 to 5 weeks  Your healthcare provider will put some liquid through the catheter to check that it works well  He may also put blood thinner medicine in it to help prevent your catheter from getting clogged  The catheter will be held in place with stitches, and the area covered with bandages  How are CAPD exchanges done? CAPD exchanges should be done in a well-lit room  There should be no pets, dander, strong breezes, or fans in the room  They can increase your risk of an infection  · Gather your supplies    Place the following supplies on a clean table close to where you will be doing your CAPD exchange:     ¨ Dialysate bag and waste product bag    ¨ Y-shaped tubing    ¨ IV stand (used to hang your dialysate bag)     ¨ Disposable medical gloves    ¨ Medical mask to wear over your face during CAPD    ¨ Tubing clamps    ¨ New plastic syringe without a needle (if needed)    · Wash your hands with soap and water  Rub your hands together with soap for at least 15 seconds before rinsing them  Dry your hands with a clean towel or paper towel  Do not touch the tubing or catheter without washing your hands and wearing gloves  Keep your fingernails short and clean  · Put on your gloves and mask  Put your mask on so that it covers your mouth and nose  Do not touch anything but the catheter and your supplies after you put the gloves on  · Flush the tubing  Flush the tubing with dialysate liquid before your exchange to help prevent infections  Connect the lower end of the Y tubing to your catheter, and connect the 2 other ends of the tubing to the dialysate bag and the waste bag  Clamp the tubing that is attached to the catheter that goes into your abdomen  This will close off the tubing so that the dialysate does not go into your abdomen yet  Allow 100 milliliters (mL) of fresh dialysate to flow out of the bag, and down the tubing into the waste bag  After 100 mL of dialysate has drained, clamp the tubing that goes to the waste bag  · Let the dialysate flow into your abdomen  Hang the bag at a higher level than your abdomen  Take the clamp off of the tubing that is attached to the catheter that goes into your abdomen  Let the rest of the dialysate flow into your abdomen  This should take no more than 10 minutes  You may lie down, sit, or stand up while the dialysate flows in  After all of the dialysate is in your abdomen, wash your hands and put on new gloves  Disconnect your catheter from the tubing  Clamp your catheter closed  Leave the dialysate in your abdomen for 3 to 5 hours of dwell time  · Drain the dialysate out of your abdomen, and into the waste bag  ¨ After the dwell time, follow the steps of washing your hands and putting on your mask  Be sure the supplies that you need are easy to reach and use  Connect the Y tubing to your catheter again   Do this in the same way as you did to put the dialysate into your abdomen  Clamp the tubing that goes to the dialysate bag so that it is closed  Hang the bag at a lower level than your abdomen  Remove the clamps from the tubing that leads to the waste bag  Let the dialysate drain from your abdomen into the waste bag  ¨ If the dialysate is not flowing out well, change your body position  If this does not make the dialysate flow out better, disconnect the end of the tubing that is attached to your catheter  Use a syringe to gently suck the dialysate out of your abdomen  It should take less than 45 minutes to drain the dialysate out of your abdomen  The dialysate that drains out should be clear  After all the dialysate has drained out, close the waste bag and dispose of it as directed  Wash your hands  What is automated peritoneal dialysis? Automated peritoneal dialysis (APD) is a type of dialysis that uses a machine called a cycler  It puts the dialysate in your abdomen and drains it out after the exchange is complete  You may do 1 exchange that lets the dialysate dwell in your abdomen during the day  At night, you can connect your catheter to the cycler to drain it  Peritoneal dialysis exchanges will also be done while you sleep  If you sleep for 8 to 9 hours, the machine may do 3 to 5 exchanges during that time  With APD, you do not need to stop what you are doing during the day to do an exchange  Ask your healthcare provider for more information about APD  Do I need to follow a special diet? · You will have to limit phosphorus and sodium (salt)  You may need to decrease or increase potassium, depending on your blood levels  You will also need extra protein, because protein is lost through your exchanges  The dialysate contains sugar, which may cause you to gain weight  Your dietitian may want you to decrease the amount of calories you have each day if you gain weight       · You may also need to limit liquid if your body is retaining (holding in) fluid  Your healthcare provider will tell you how much liquid to drink each day  Write down how much liquid you drink each day  Measure the amount of urine you pass each time you go to the bathroom  Your healthcare provider may ask you to weigh yourself each day  Show this information to your healthcare provider when you have follow-up visits  He will tell you if you have too much or too little fluid in your body, and what to do to correct it  When should I contact my healthcare provider? · Pus or fluid is draining out of the exit site  · The dialysate that drains out of your abdomen looks cloudy  · The exit site is bigger than it used to be  · Dialysate is not flowing out of your abdomen during an exchange, even after you change position and use a syringe  · You have a fever or chills  · You have dull pain in your abdomen while you do a dialysis exchange  · A new bump has grown in your abdomen since you started doing CAPD exchanges  · Your catheter exit site is red, tender, or painful  · You have questions or concerns about your condition or care  When should I seek immediate care or call 911? · You have constipation  · You have stomach pain and are vomiting  · You have trouble breathing while you do your exchanges  · Your catheter has a crack or hole in it, or it has come part or all of the way out of your abdomen  CARE AGREEMENT:   You have the right to help plan your care  Learn about your health condition and how it may be treated  Discuss treatment options with your caregivers to decide what care you want to receive  You always have the right to refuse treatment  The above information is an  only  It is not intended as medical advice for individual conditions or treatments  Talk to your doctor, nurse or pharmacist before following any medical regimen to see if it is safe and effective for you    © 2017 [address] Information is for End User's use only and may not be sold, redistributed or otherwise used for commercial purposes  All illustrations and images included in CareNotes® are the copyrighted property of [employer]  or Rickie Carcamo  Diabetic Gastroparesis   AMBULATORY CARE:   Diabetic gastroparesis  is a type of nerve damage that slows digestion  High blood sugar levels from diabetes can damage nerves and tissues in your stomach  The damage prevents your stomach from emptying normally  Gastroparesis is also called delayed gastric emptying  Your symptoms may be worse if you drink alcohol or smoke  Common signs and symptoms:   · Constipation that may be replaced, at times, by diarrhea    · High or low blood sugar levels that you cannot control    · Nausea, vomiting, or loss of appetite    · Bloated or early full feeling while you eat    · Sudden cramps, swelling, or pain in your abdomen    · Heartburn  Seek care immediately if:   · You are vomiting more severely or for a longer period than usual      · You urinate less than usual, and your mouth is dry  · You feel dizzy and weak, or you have fainted  · You have severe pain in your stomach or abdomen  Contact your healthcare provider if:   · Your blood sugar level is higher or lower than healthcare providers have told you it should be  · You continue to have pain and bloating in your abdomen  · You continue to have nausea and vomiting, or you are not able to eat  · You have questions or concerns about your condition or care  Treatment for diabetic gastroparesis  may include medicines to help your stomach muscles move food and liquids out of your stomach faster  You may also need medicines to stop nausea and vomiting  A feeding tube may need to be placed if your gastroparesis is severe  Manage your symptoms:   · Walk after you eat  This may help speed digestion  · Follow the meal plan  that your healthcare or dietitian gave you   This meal plan can help decrease your symptoms  The following may also help you manage your symptoms:     ¨ Eat less fat and fiber  High-fat and high-fiber foods may be hard for your stomach to digest  You may need to avoid fruits and vegetables such as oranges and broccoli  ¨ Eat 4 to 6 small meals a day  Smaller, more frequent meals are easier for your stomach to handle  ¨ Drink more liquids with your meals  Your healthcare provider may recommend liquid meals, such as soup  Liquid is easier to digest than solid food  ¨ Ask if you should prepare your food in a   Blended foods are easier to digest  Ask for directions on which foods to use and how to blend the food correctly  ¨ Ask about vitamins  you may need and how to add them to your meals  · Do not smoke  Nicotine can damage blood vessels, slow your digestion, and make it more difficult to manage your diabetes  Do not use e-cigarettes or smokeless tobacco in place of cigarettes or to help you quit  They still contain nicotine  Ask your healthcare provider for information if you currently smoke and need help quitting  · Do not drink alcohol  Alcohol may slow your digestion more  · Follow your diabetes treatment plan  You may need to check your blood sugar more often  High blood sugar levels slow digestion and can make your symptoms worse  © 2017 [address]  Information is for End User's use only and may not be sold, redistributed or otherwise used for commercial purposes  All illustrations and images included in CareNotes® are the copyrighted property of [employer]  or Rickie Carcamo  The above information is an  only  It is not intended as medical advice for individual conditions or treatments  Talk to your doctor, nurse or pharmacist before following any medical regimen to see if it is safe and effective for you

## 2020-08-06 NOTE — DISCHARGE SUMMARY
Discharge- Nisreen Pham 1985, 37 y o  male MRN: [id number]    Unit/Bed#: S -01 Encounter: [id number]    Primary Care Provider: Yariel Almeida DO   Date and time admitted to hospital: 7/31/2020  5:07 PM      Hypertensive urgency  Assessment & Plan  · Patient with hypertensive urgency with BP max of 222/93 in the emergency department  · Currently managed with Doxazosin 2mg, Hydralazine 50mg BID, Labetalol 300mg Q12H, Lisinopril 40mg daily, Nifedipine 60mg BID and Torsemide 100mg BID  · Clonidine PRN  · Hydralazine increased to TID  · Continue to Monitor BP    Vitals:    08/06/20 1132 08/06/20 1142 08/06/20 1148 08/06/20 1208   BP: (!) 179/90  (!) 180/86 (!) 189/85   BP Location:    Right arm   Pulse: 91 94 93 97   Resp: 20  18 18   Temp:    98 1 °F (36 7 °C)   TempSrc:  Temporal  Oral   SpO2: 100% 100% 100% 99%   Weight:       Height:             Gastroparesis diabeticorum (HCC)  Assessment & Plan  Followed as an outpt by Dr Leena Sanchez  EGD performed today, patient had Botox administered during EGD  A1c 6 8 - 6/21/2020    Recent Labs     08/06/20  0202 08/06/20  0616 08/06/20  0716 08/06/20  1204   POCGLU 213* 310* 264* 274*       Peritoneal dialysis catheter in place [facility])  Assessment & Plan  · Patient with peritoneal dialysis catheter in place  · Creatinine baseline from 12-14     · Nephrology onboard  · Per nephro and ID catheter is to remain in place and pt treated with abx - will continue with IV vanco for now and on d/c will then transition to outpt treatment through 8/13    Anemia  Assessment & Plan  Patient has chronic anemia    History of lacunar cerebrovascular accident (CVA)  Assessment & Plan  · Continue ASA, and statin  · Continue BP meds with the addition of Clonidine    Type 1 diabetes mellitus with diabetic gastropathy [facility])  Assessment & Plan  Lab Results   Component Value Date    HGBA1C 6 8 (H) 06/21/2020       Recent Labs     08/06/20  0202 08/06/20  0250 08/06/20  9820 08/06/20  1204   POCGLU 213* 310* 264* 274*       Blood Sugar Average: Last 72 hrs:  (P) 748 [id number]   · 30 year hx of type 1 diabetes with history of inconsistent sugars  Diagnosed at 1years old  · Continue home medications including Lantus 5 units after breakfast   · Continue Humalog 4 units with breakfast, 2 units with lunch, 4 units with dinner  · Continue Novolin 4 units HS  * Peritonitis ([address] )  Assessment & Plan  · Presents with abdominal pain and rigors  Patient has been admitted for peritonitis in the past and was recently discharged dignosed with staph epidermis peritonitis and sent with a course of intraperitoneal Vancomycin for 14 days  · CT scan showed Wall thickening of the proximal sigmoid colon and distal descending colon could relate to underdistention or colitis  Moderate amount of abdominal and pelvic ascites with left-sided catheter terminating in the medial pelvis  C diff negative on 7/30   · Body cultures preliminary results from 1 culture showed enterococci          Discharging Resident Physician: Gomez Brice DO  Attending: Irina Armenta MD  PCP: Finesse Caceres DO  Admission Date: 7/31/2020  Discharge Date: 08/06/20    Disposition:     Home    Reason for Admission: Infection of PD cath    Consultations During Hospital Stay:  · Infectious Disease, Nephrology, GI    Procedures Performed:     · EGD    Significant Findings / Test Results:     · Enterococci    Incidental Findings:   None  Test Results Pending at Discharge (will require follow up): · None     Outpatient Tests Requested:  · None    Complications:  None    Hospital Course: Price Rodrigues is a 37 y o  male patient who originally presented to the hospital on 7/31/2020 due to abdominal pain  He has past medical history of CVA, chronic kidney disease on peritoneal dialysis, hypertension, type 1 diabetes    Patient presented to the emergency department for 2 day history of severe abdominal pain which he stated was similar to peritonitis he experienced approximately 1-2 months ago  Patient mother states that he had cloudy fluid drawn from his peritoneal dialysis catheter  They do state that he has been having diarrhea for approximately 1 week  He was seen in the emergency department the day prior to admission for this complaint when he was checked for C diff which was negative at that time  Patient was seen previously for peritonitis and was continued on vancomycin intraperitoneal  PT had a CT on 7/31 that showed wall thickening of the proximal sigmoid colon and distal descending colon could relate to underdistention or colitis  Moderate amount of abdominal and pelvic ascites with left-sided catheter terminating in the medial pelvis Pt was then admitted to the hospital and had consults placed to both Nephrology and Infectious Disease  Cultures were drawn which ultimately grew enterococci  Surgery was consulted for possible removal of PD catheter however ultimately it was decided against removal   Patient was continued on vancomycin during his hospital stay  If the patient has a 3rd infection Nephrology and infectious disease will be more likely to have the catheter removed and an HD cath placed  During his stay the patient experienced extensive nausea and vomiting but this was ultimately controlled by placing the patient on daily doses of reglan  The patient was initially scheduled for an outpatient EGD on 80/6 however given the fact that he was still in the hospital this was moved to an inpatient procedure  During the EGD Botox was given to help with his gastroparesis  GI recommended that he be given Reglan to times daily as needed maximum and instead she used primarily Zofran  The patient also received his Epogen shot during his stay on 08/06/2020  The patient was given instructions to follow up with Infectious Disease, nephrology and Gastroenterology as an outpatient      Condition at Discharge: good Discharge Day Visit / Exam:     Subjective:  Patient was seen and examined  No acute events overnight  Vitals: Blood Pressure: (!) 189/85 (08/06/20 1208)  Pulse: 97 (08/06/20 1208)  Temperature: 98 1 °F (36 7 °C) (08/06/20 1208)  Temp Source: Oral (08/06/20 1208)  Respirations: 18 (08/06/20 1208)  Height: 5' 10" (177 8 cm) (08/06/20 0945)  Weight - Scale: 97 1 kg (214 lb) (08/06/20 0945)  SpO2: 99 % (08/06/20 1208)  Exam:   Physical Exam   Constitutional: He is oriented to person, place, and time  He appears well-developed and well-nourished  No distress  HENT:   Head: Normocephalic and atraumatic  Nose: Nose normal    Eyes: Pupils are equal, round, and reactive to light  Conjunctivae and EOM are normal    Cardiovascular: Normal rate and regular rhythm  Exam reveals no gallop and no friction rub  No murmur heard  Pulmonary/Chest: Effort normal and breath sounds normal  No respiratory distress  Abdominal: Soft  Bowel sounds are normal  He exhibits no distension  There is no abdominal tenderness  Neurological: He is alert and oriented to person, place, and time  Skin: Skin is warm and dry  He is not diaphoretic  Psychiatric: He has a normal mood and affect  His behavior is normal  Judgment and thought content normal      Discussion with Family:  Discussions regarding the patient's plan of care and the possible need for a HD catheter if he is to have a 3rd infection requiring the removal of his PD catheter  A thorough medication reconciliation was completed  Discharge instructions/Information to patient and family:   See after visit summary for information provided to patient and family  Provisions for Follow-Up Care:  See after visit summary for information related to follow-up care and any pertinent home health orders  Planned Readmission: None     Discharge Medications:  See after visit summary for reconciled discharge medications provided to patient and family        ** Please Note: This note has been constructed using a voice recognition system **

## 2020-08-07 ENCOUNTER — TRANSITIONAL CARE MANAGEMENT (OUTPATIENT)
Dept: INTERNAL MEDICINE CLINIC | Facility: CLINIC | Age: 35
End: 2020-08-07

## 2020-08-10 ENCOUNTER — OFFICE VISIT (OUTPATIENT)
Dept: INTERNAL MEDICINE CLINIC | Facility: CLINIC | Age: 35
End: 2020-08-10
Payer: MEDICARE

## 2020-08-10 VITALS
BODY MASS INDEX: 32.47 KG/M2 | DIASTOLIC BLOOD PRESSURE: 78 MMHG | HEART RATE: 94 BPM | TEMPERATURE: 98.7 F | SYSTOLIC BLOOD PRESSURE: 150 MMHG | RESPIRATION RATE: 16 BRPM | OXYGEN SATURATION: 98 % | HEIGHT: 70 IN | WEIGHT: 226.8 LBS

## 2020-08-10 DIAGNOSIS — I15.0 RENOVASCULAR HYPERTENSION: ICD-10-CM

## 2020-08-10 DIAGNOSIS — E11.43 GASTROPARESIS DIABETICORUM (HCC): Primary | ICD-10-CM

## 2020-08-10 DIAGNOSIS — K65.9 PERITONITIS (HCC): ICD-10-CM

## 2020-08-10 DIAGNOSIS — Z99.2 PERITONEAL DIALYSIS CATHETER IN PLACE (HCC): ICD-10-CM

## 2020-08-10 DIAGNOSIS — K31.84 GASTROPARESIS DIABETICORUM (HCC): Primary | ICD-10-CM

## 2020-08-10 PROCEDURE — 99496 TRANSJ CARE MGMT HIGH F2F 7D: CPT | Performed by: INTERNAL MEDICINE

## 2020-08-10 RX ORDER — SUCROFERRIC OXYHYDROXIDE 500 MG/1
TABLET, CHEWABLE ORAL
COMMUNITY
Start: 2020-07-23 | End: 2020-11-06

## 2020-08-10 RX ORDER — LISINOPRIL 20 MG/1
TABLET ORAL
COMMUNITY
Start: 2020-07-28 | End: 2020-08-10

## 2020-08-10 NOTE — PROGRESS NOTES
Assessment/Plan:    Problem List Items Addressed This Visit        Digestive    Gastroparesis diabeticorum ([facility] ) - Primary     -secondary to DM1  -s/p botox injection per GI  -continue reglan and small frequent meals            Cardiovascular and Mediastinum    Renovascular hypertension     -labile post hospitalization with low bp causing fatigue  -continue to adjust hydralazine, consider decrease to 50mg TID for now  Will defer additional adjustments per Nephro   -pt also on doxazosin 2mg daily, labetalol 300mg q12, lisinopril 40mg daily, nifedipine 60mg bid and torsemide 100mg bid  Clonidine use is prn  Other    Peritoneal dialysis catheter in place [facility])    Peritonitis ([facility] [address] )     -catheter associated peritonitis, 2nd episode  -per hospital record, decision for catheter removal and switch to HD if another episode occurs  -he is to complete intraperitoneal vanco for total 14d               Subjective:      Patient ID: Haylee Lopez is a 37 y o  male  HPI  TCM Call (since 7/10/2020)     Date and time call was made  8/7/2020 11:45 AM    Hospital care reviewed  Records reviewed    Patient was hospitialized at  [address] [place] [address]    Date of Admission  07/31/20    Date of discharge  08/06/20    Diagnosis  Peritonitis    Disposition  Home    Current Symptoms  None      TCM Call (since 7/10/2020)     Scheduled for follow up? Yes    I have advised the patient to call PCP with any new or worsening symptoms  Srikanth Serrano CMA    Counseling  Patient    Comments  Patient appointment scheduled for 8/10/20        42yo male with DM1 with ESRD on PD, gastroparesis, polyneuropathy, HTN, HLD, MDD, vitamin D def with h/o CVA here for OZ  He is accompanied by his mother  Patient hospitalized at [facility] 7/31-8/6/20 for recurrent peritonitis  He presented with rigors and abdominal pain  CT a/p consistent with peritonitis  Peritoneal culture showing enterococcus faecalis, BC negative   He was placed on vanco q2days  During his hospitalization, he also was noted to have HTN urgency  Hydralazine was adjusted to 100mg TID and continued on other BP meds  EGD was performed inpatient as he was scheduled for outpatient procedure during his hospitalization  Botox administered due to gastroparesis  He is now on reglan, he did not have to use zofran last night  He denies f/c, abd pain, SOB  He adheres to small frequent meals  Appetite is fair  He has had a few low BP since discharge and is self adjusting hydralazine to -50mg instead of 100mg TID       The following portions of the patient's history were reviewed and updated as appropriate: allergies, current medications, past family history, past medical history, past social history, past surgical history and problem list       Current Outpatient Medications:     aspirin 81 mg chewable tablet, Chew 81 mg daily, Disp: , Rfl:     atorvastatin (LIPITOR) 40 mg tablet, Take 1 tablet (40 mg total) by mouth every evening, Disp: 90 tablet, Rfl: 1    AVASTIN 100 MG/4ML, see administration instructions Given by opthalmology, Disp: , Rfl:     b complex vitamins capsule, Take 1 capsule by mouth daily before lunch , Disp: , Rfl:     B-D ULTRAFINE III SHORT PEN 31G X 8 MM MISC, USE 1 PEN NEEDLE 8 TIMES DAILY, Disp: 100 each, Rfl: 47    calcium acetate (PHOSLO) 667 mg capsule, Take 1 capsule (667 mg total) by mouth 3 (three) times a day with meals, Disp: , Rfl: 0    Cholecalciferol (VITAMIN D) 2000 units CAPS, Take 1 capsule by mouth daily, Disp: , Rfl:     cinacalcet (SENSIPAR) 60 MG tablet, Take 1 tablet (60 mg total) by mouth daily (Patient taking differently: Take 60 mg by mouth every other day ), Disp: , Rfl:     cloNIDine (CATAPRES) 0 1 mg tablet, Take 0 1 mg by mouth as needed May add another 0 1 as needed, Disp: , Rfl:     divalproex sodium (DEPAKOTE ER) 250 mg 24 hr tablet, Take 1 tablet (250 mg total) by mouth daily For 14 days then stop, Disp: 14 tablet, Rfl: 0    doxazosin (CARDURA) 2 mg tablet, Take 1 tablet (2 mg total) by mouth daily at bedtime (Patient taking differently: Take 2 mg by mouth daily ), Disp: 30 tablet, Rfl: 0    escitalopram (LEXAPRO) 10 mg tablet, Take 1 tablet (10 mg total) by mouth daily, Disp: 90 tablet, Rfl: 2    famotidine (PEPCID) 40 MG tablet, Take 1 tablet (40 mg total) by mouth 2 (two) times a day, Disp: 60 tablet, Rfl: 2    gabapentin (NEURONTIN) 100 mg capsule, Take 100 mg by mouth daily at bedtime, Disp: , Rfl: 1    gentamicin (GARAMYCIN) 0 1 % cream, APPLY TO EXIT SITE ONCE DAILY, Disp: 30 g, Rfl: 2    GLUCAGON EMERGENCY 1 MG injection, INJECT 1MG SUBCUTANEOUSLY AS NEEDED (AS DIRECTED)   MAY REPEAT DOSE EVERY 20 MINUTES AS NEEDED, Disp: , Rfl: 3    hydrALAZINE (APRESOLINE) 100 MG tablet, Take 0 5 tablets (50 mg total) by mouth 3 (three) times a day, Disp: , Rfl:     insulin glargine (LANTUS) 100 units/mL subcutaneous injection, Inject 5 Units under the skin daily after breakfast, Disp: , Rfl:     insulin lispro (Admelog SoloStar) 100 units/mL injection pen, Take 4 units with breakfast, 2 units with lunch, and 4 units with dinner, Disp: 3 mL, Rfl: 0    insulin NPH (HumuLIN N,NovoLIN N) 100 Units/mL subcutaneous injection, Inject 4 Units under the skin daily at bedtime, Disp: , Rfl:     labetalol (NORMODYNE) 300 mg tablet, Take 1 tablet (300 mg total) by mouth every 12 (twelve) hours (Patient taking differently: Take 300 mg by mouth see administration instructions Take 1 tablet at 6AM and second tablet at 9PM), Disp: 60 tablet, Rfl: 0    lisinopril (ZESTRIL) 40 mg tablet, Take 40 mg by mouth daily, Disp: , Rfl:     metoclopramide (REGLAN) 5 mg tablet, Take 1 tablet (5 mg total) by mouth 2 (two) times a day as needed (May take up two 2 doses as needed for nausea and vomiting if Zofran has not worked), Disp: 20 tablet, Rfl: 1    metolazone (ZAROXOLYN) 10 mg tablet, Take 10 mg by mouth daily Take 1/2 an hour before lunch, Disp: , Rfl:     NIFEdipine ER (ADALAT CC) 60 MG 24 hr tablet, Take 1 tablet (60 mg total) by mouth 2 (two) times a day, Disp: 180 tablet, Rfl: 0    ondansetron (ZOFRAN) 4 mg tablet, Take 1 tablet (4 mg total) by mouth every 8 (eight) hours as needed for nausea or vomiting (Patient taking differently: Take 4 mg by mouth every 8 (eight) hours as needed for nausea or vomiting ), Disp: 30 tablet, Rfl: 0    Probiotic Product (PROBIOTIC DAILY) CAPS, Take 1 capsule by mouth daily at bedtime , Disp: , Rfl:     Sucroferric Oxyhydroxide (VELPHORO PO), 500 mg 3 (three) times a day with meals , Disp: , Rfl:     torsemide (DEMADEX) 100 mg tablet, Take 100 mg by mouth 2 (two) times a day , Disp: , Rfl: 11    Velphoro 500 MG CHEW, CHEW AND SWALLOW 1 TABLET BY MOUTH WITH MEALS (UP TO THREE TIMES DAILY)  , Disp: , Rfl:     Review of Systems   Constitutional: Positive for appetite change, fatigue and unexpected weight change  Negative for chills and fever  Respiratory: Negative for cough, shortness of breath, wheezing and stridor  Cardiovascular: Negative for chest pain, palpitations and leg swelling  Gastrointestinal: Positive for nausea  Negative for abdominal pain and vomiting  Loose stools   Neurological: Positive for dizziness  Negative for headaches  Psychiatric/Behavioral: Positive for decreased concentration and dysphoric mood  Objective:    /78 (BP Location: Left arm, Patient Position: Sitting)   Pulse 94   Temp 98 7 °F (37 1 °C)   Resp 16   Ht 5' 10" (1 778 m)   Wt 103 kg (226 lb 12 8 oz)   SpO2 98%   BMI 32 54 kg/m²      Physical Exam  Vitals signs reviewed  Constitutional:       Appearance: He is obese  He is ill-appearing  He is not diaphoretic  Cardiovascular:      Rate and Rhythm: Normal rate and regular rhythm  Pulses: Normal pulses  Heart sounds: Normal heart sounds  Pulmonary:      Effort: Pulmonary effort is normal  No respiratory distress  Breath sounds: Normal breath sounds  No wheezing  Abdominal:      General: There is no distension  Palpations: Abdomen is soft  Tenderness: There is no abdominal tenderness  There is no guarding  Musculoskeletal:      Right lower leg: No edema  Left lower leg: No edema  Neurological:      Mental Status: He is alert and oriented to person, place, and time  Psychiatric:         Attention and Perception: Attention and perception normal          Mood and Affect: Mood and affect normal          Behavior: Behavior is cooperative

## 2020-08-10 NOTE — ASSESSMENT & PLAN NOTE
-catheter associated peritonitis, 2nd episode  -per hospital record, decision for catheter removal and switch to HD if another episode occurs  -he is to complete intraperitoneal vanco for total 14d

## 2020-08-10 NOTE — ASSESSMENT & PLAN NOTE
-labile post hospitalization with low bp causing fatigue  -continue to adjust hydralazine, consider decrease to 50mg TID for now  Will defer additional adjustments per Nephro   -pt also on doxazosin 2mg daily, labetalol 300mg q12, lisinopril 40mg daily, nifedipine 60mg bid and torsemide 100mg bid  Clonidine use is prn

## 2020-08-11 ENCOUNTER — TELEPHONE (OUTPATIENT)
Dept: GASTROENTEROLOGY | Facility: MEDICAL CENTER | Age: 35
End: 2020-08-11

## 2020-08-11 NOTE — TELEPHONE ENCOUNTER
----- Message from Elroy Garner MD sent at 8/10/2020  2:46 PM EDT -----  Please inform the patient that the gastric biopsies are negative for H pylori  No need for repeat EGD unless alarm symptoms    Continue famotidine 40 mg b i d   Follow-up in the office with PA in the next 3-4 months

## 2020-08-28 ENCOUNTER — TELEPHONE (OUTPATIENT)
Dept: NEUROLOGY | Facility: CLINIC | Age: 35
End: 2020-08-28

## 2020-08-28 NOTE — TELEPHONE ENCOUNTER
PT aware of appointment change from [place] to Dr Carolyne Bauer  PT wanted to keep his appointment on 9/17/20  PT is aware of time change to 10 AM  Offered appointment card to be mailed out  PT stated he wrote it down no card necessary

## 2020-09-08 ENCOUNTER — OFFICE VISIT (OUTPATIENT)
Dept: INTERNAL MEDICINE CLINIC | Facility: CLINIC | Age: 35
End: 2020-09-08
Payer: MEDICARE

## 2020-09-08 ENCOUNTER — HOSPITAL ENCOUNTER (EMERGENCY)
Facility: HOSPITAL | Age: 35
Discharge: HOME | End: 2020-09-08
Attending: EMERGENCY MEDICINE | Admitting: EMERGENCY MEDICINE
Payer: MEDICARE

## 2020-09-08 VITALS
WEIGHT: 227.4 LBS | HEART RATE: 94 BPM | TEMPERATURE: 98.7 F | BODY MASS INDEX: 32.56 KG/M2 | RESPIRATION RATE: 16 BRPM | OXYGEN SATURATION: 97 % | SYSTOLIC BLOOD PRESSURE: 152 MMHG | HEIGHT: 70 IN | DIASTOLIC BLOOD PRESSURE: 98 MMHG

## 2020-09-08 VITALS
HEART RATE: 88 BPM | TEMPERATURE: 98.9 F | DIASTOLIC BLOOD PRESSURE: 82 MMHG | RESPIRATION RATE: 18 BRPM | SYSTOLIC BLOOD PRESSURE: 207 MMHG | OXYGEN SATURATION: 100 %

## 2020-09-08 DIAGNOSIS — E83.39 HYPERPHOSPHATEMIA: ICD-10-CM

## 2020-09-08 DIAGNOSIS — R45.851 VERBALIZES SUICIDAL THOUGHTS: ICD-10-CM

## 2020-09-08 DIAGNOSIS — N17.0 ACUTE RENAL FAILURE WITH ACUTE TUBULAR NECROSIS SUPERIMPOSED ON STAGE 2 CHRONIC KIDNEY DISEASE (HCC): ICD-10-CM

## 2020-09-08 DIAGNOSIS — Z86.73 HISTORY OF LACUNAR CEREBROVASCULAR ACCIDENT (CVA): ICD-10-CM

## 2020-09-08 DIAGNOSIS — I16.0 HYPERTENSIVE URGENCY: ICD-10-CM

## 2020-09-08 DIAGNOSIS — I63.9 CEREBROVASCULAR ACCIDENT (CVA), UNSPECIFIED MECHANISM (HCC): ICD-10-CM

## 2020-09-08 DIAGNOSIS — N18.30 ACUTE RENAL FAILURE WITH ACUTE TUBULAR NECROSIS SUPERIMPOSED ON STAGE 3 CHRONIC KIDNEY DISEASE (HCC): ICD-10-CM

## 2020-09-08 DIAGNOSIS — H53.8 BLURRED VISION: ICD-10-CM

## 2020-09-08 DIAGNOSIS — F32.1 CURRENT MODERATE EPISODE OF MAJOR DEPRESSIVE DISORDER WITHOUT PRIOR EPISODE (HCC): Primary | ICD-10-CM

## 2020-09-08 DIAGNOSIS — R80.1 PERSISTENT PROTEINURIA: ICD-10-CM

## 2020-09-08 DIAGNOSIS — E72.11 HYPERHOMOCYSTEINEMIA (HCC): ICD-10-CM

## 2020-09-08 DIAGNOSIS — R11.0 NAUSEA: ICD-10-CM

## 2020-09-08 DIAGNOSIS — E55.9 VITAMIN D DEFICIENCY: ICD-10-CM

## 2020-09-08 DIAGNOSIS — N17.0 ACUTE RENAL FAILURE WITH ACUTE TUBULAR NECROSIS SUPERIMPOSED ON STAGE 3 CHRONIC KIDNEY DISEASE (HCC): ICD-10-CM

## 2020-09-08 DIAGNOSIS — H53.30 BINOCULAR VISUAL DISTURBANCE: ICD-10-CM

## 2020-09-08 DIAGNOSIS — F32.A DEPRESSION, UNSPECIFIED DEPRESSION TYPE: Primary | ICD-10-CM

## 2020-09-08 DIAGNOSIS — H51.0 BINOCULAR VISION DISORDER WITH CONJUGATE GAZE PALSY: ICD-10-CM

## 2020-09-08 DIAGNOSIS — G35 OPTIC NEURITIS DUE TO MULTIPLE SCLEROSIS (HCC): ICD-10-CM

## 2020-09-08 DIAGNOSIS — N18.2 ACUTE RENAL FAILURE WITH ACUTE TUBULAR NECROSIS SUPERIMPOSED ON STAGE 2 CHRONIC KIDNEY DISEASE (HCC): ICD-10-CM

## 2020-09-08 DIAGNOSIS — E10.21 TYPE 1 DIABETES MELLITUS WITH DIABETIC NEPHROPATHY, WITH LONG-TERM CURRENT USE OF INSULIN (HCC): Chronic | ICD-10-CM

## 2020-09-08 DIAGNOSIS — R79.89 AZOTEMIA: ICD-10-CM

## 2020-09-08 DIAGNOSIS — H46.9 OPTIC NEURITIS DUE TO MULTIPLE SCLEROSIS (HCC): ICD-10-CM

## 2020-09-08 DIAGNOSIS — I63.9 CEREBROVASCULAR ACCIDENT (CVA) OF LEFT PONTINE STRUCTURE (HCC): ICD-10-CM

## 2020-09-08 LAB — ETHANOL EXG-MCNC: 0 MG/DL

## 2020-09-08 PROCEDURE — 99282 EMERGENCY DEPT VISIT SF MDM: CPT | Performed by: EMERGENCY MEDICINE

## 2020-09-08 PROCEDURE — 82075 ASSAY OF BREATH ETHANOL: CPT | Performed by: EMERGENCY MEDICINE

## 2020-09-08 PROCEDURE — 99215 OFFICE O/P EST HI 40 MIN: CPT | Performed by: NURSE PRACTITIONER

## 2020-09-08 PROCEDURE — 99284 EMERGENCY DEPT VISIT MOD MDM: CPT

## 2020-09-08 RX ORDER — ATORVASTATIN CALCIUM 40 MG/1
40 TABLET, FILM COATED ORAL EVERY EVENING
Qty: 90 TABLET | Refills: 1 | Status: SHIPPED | OUTPATIENT
Start: 2020-09-08 | End: 2021-03-23 | Stop reason: SDUPTHER

## 2020-09-08 NOTE — ASSESSMENT & PLAN NOTE
Pt has been feeling suicidal that past couple of days, denies an active plan and "thinks" he would not do anything  I expressed that this is a more urgent change in his mental health and would like to have him evaluated promptly to help modify treatment and get him established with therapy services  He is willing to participate in partial hospitalization, would not be readily agreeable to psychiatric admission  Referral placed for [facility] and pt advised to go to the ER for evaluation

## 2020-09-08 NOTE — ASSESSMENT & PLAN NOTE
Worsening depression symptoms, follow up phq 9 score is 21  He reports a bad relationship with his mother which has caused depression symptoms to build  Unfortunately, he has been having thoughts of suicide in the past couple of days, though he denies an active plan  He is currently taking escitalopram 10mg    Pt referred to the ER and also [facility] partial hospitalization program

## 2020-09-08 NOTE — PROGRESS NOTES
Assessment/Plan:  I have spent 25 minutes with Patient  today in which greater than 50% of this time was spent in counseling/coordination of care regarding Prognosis, Risks and benefits of tx options, Intructions for management, Risk factor reductions and Impressions  Current moderate episode of major depressive disorder without prior episode (HCC)  Worsening depression symptoms, follow up phq 9 score is 21  He reports a bad relationship with his mother which has caused depression symptoms to build  Unfortunately, he has been having thoughts of suicide in the past couple of days, though he denies an active plan  He is currently taking escitalopram 10mg  Pt referred to the ER and also [facility] program       Verbalizes suicidal thoughts  Pt has been feeling suicidal that past couple of days, denies an active plan and "thinks" he would not do anything  I expressed that this is a more urgent change in his mental health and would like to have him evaluated promptly to help modify treatment and get him established with therapy services  He is willing to participate in partial hospitalization, would not be readily agreeable to psychiatric admission  Referral placed for [facility] and pt advised to go to the ER for evaluation  Diagnoses and all orders for this visit:    Current moderate episode of major depressive disorder without prior episode [facility])  -     Ambulatory referral to [facility] or partial psych program; Future    Verbalizes suicidal thoughts  -     Transfer to other facility  -     Ambulatory referral to [facility] or partial psych program; Future          Subjective:      Patient ID: Breanna Sorto is a 38 y o  male  Pt is a 38 y o  y/o male who is seen today for evaluation of depression symptoms  He states that in the past couple of days he has been experiencing thoughts of ending his life    He does not have an actual plan for suicide and does not "think" he will do anything  He states that he feels that his home situation has been causing these feelings to build over time and it has just gotten worse lately  The following portions of the patient's history were reviewed and updated as appropriate: allergies, current medications, past family history, past medical history, past social history, past surgical history and problem list     Review of Systems   Constitutional: Positive for appetite change  Psychiatric/Behavioral: Positive for decreased concentration, dysphoric mood, self-injury, sleep disturbance and suicidal ideas  Negative for agitation, behavioral problems, confusion and hallucinations  The patient is nervous/anxious and is hyperactive  Objective:      /98   Pulse 94   Temp 98 7 °F (37 1 °C)   Resp 16   Ht 5' 10" (1 778 m)   Wt 103 kg (227 lb 6 4 oz)   SpO2 97%   BMI 32 63 kg/m²          Physical Exam  Constitutional:       Appearance: He is well-developed  Eyes:      General: Lids are normal       Conjunctiva/sclera: Conjunctivae normal    Pulmonary:      Effort: Pulmonary effort is normal  No respiratory distress  Skin:     General: Skin is dry  Neurological:      Mental Status: He is alert and oriented to person, place, and time  Psychiatric:         Attention and Perception: Attention normal          Mood and Affect: Mood is depressed  Speech: Speech normal          Behavior: Behavior normal  Behavior is cooperative  Thought Content:  Thought content normal          Cognition and Memory: Cognition normal          Judgment: Judgment normal

## 2020-09-09 ENCOUNTER — TELEPHONE (OUTPATIENT)
Dept: PSYCHIATRY | Facility: CLINIC | Age: 35
End: 2020-09-09

## 2020-09-09 NOTE — ED NOTES
Patient remains in room resting comfortably with no distress noted  Will continue to monitor       Lilian Nieto RN  09/08/20 2028

## 2020-09-09 NOTE — ED NOTES
Intake / safety assessment completed with patient who presents to ED on his own after it was suggested by his PCP he come for further assessment as he expressed suicidal thoughts earlier today  Patient denies having a plan and states he no longer feels suicidal   He admits to having a bad day and stated he was feeling "dark"  Patient reports that he would never do anyting to harm himself  He further notes that he and his mother had gotten into a fight over his gluclose monitor which triggered his thoughts  patient denies homicidal ideation  He also denies any hallucinations  Patient has a number of medical concerns  and relies on both his parents for help  Patient denies any previous attempts to harm self or any inpatient hospitalizations or treatment  According to patient he is prescribed medication for depression by his PCP  Patient states he feels safe to go home which Dr was in agreement with  Patient provided with outpatient resources as well as information on partial programming  Patient is not sure about partial program, but was in agreement with outpatient counseling  Patient was also given 24 hour crisis phone number and was encouraged to return to ED if symptoms worsened

## 2020-09-09 NOTE — ED NOTES
Crisis spoke to provider, okay to d/c and do outpatient resources       Phyllis De Luna, MIKAEL  09/08/20 2028

## 2020-09-09 NOTE — ED PROVIDER NOTES
History  Chief Complaint   Patient presents with    Psychiatric Evaluation     pt reports thought of suicide today, denies plany  states he is not currently feeling suicidal       Terence Clark is a 38 y o  male with an extensive PMHx, but no known psychiatric history who presents today for evaluation  Patient reports that he is feeling down and this started initially when he had a fight with his mother this morning  The patient is significantly dependent on his parents due to his significant medical history and disability and following the fight he reports that he had "dark" thoughts that he has never had before, specifically these thoughts were suicidal in nature  The patient reported these thoughts to his dialysis nurse who then sent him to his PCP for evaluation  The patient's PCP then sent him to the emergency room for further evaluation  The patient reports that he does not know how he would commit suicide and has no specific plan  The patient does report having productive factors those being his nieces and nephews  The patient reported that he would like to speak with crisis as he would like to get set up with counseling as he feels that this would help him deal with the emotional tall that his significant medical issues that have taken on him  Prior to Admission Medications   Prescriptions Last Dose Informant Patient Reported? Taking? AVASTIN 100 MG/4ML   Yes No   Sig: see administration instructions Given by opthalmology   B-D ULTRAFINE III SHORT PEN 31G X 8 MM MISC   No No   Sig: USE 1 PEN NEEDLE 8 TIMES DAILY   Cholecalciferol (VITAMIN D) 2000 units CAPS  Mother Yes No   Sig: Take 1 capsule by mouth daily   GLUCAGON EMERGENCY 1 MG injection   Yes No   Sig: INJECT 1MG SUBCUTANEOUSLY AS NEEDED (AS DIRECTED)   MAY REPEAT DOSE EVERY 20 MINUTES AS NEEDED   NIFEdipine ER (ADALAT CC) 60 MG 24 hr tablet   No No   Sig: Take 1 tablet (60 mg total) by mouth 2 (two) times a day   Probiotic Product (PROBIOTIC DAILY) CAPS  Self Yes No   Sig: Take 1 capsule by mouth daily at bedtime    Sucroferric Oxyhydroxide (VELPHORO PO)   Yes No   Si mg 3 (three) times a day with meals    Velphoro 500 MG CHEW   Yes No   Sig: CHEW AND SWALLOW 1 TABLET BY MOUTH WITH MEALS (UP TO THREE TIMES DAILY)     aspirin 81 mg chewable tablet  Mother Yes No   Sig: Chew 81 mg daily   atorvastatin (LIPITOR) 40 mg tablet   No No   Sig: Take 1 tablet (40 mg total) by mouth every evening   b complex vitamins capsule  Mother Yes No   Sig: Take 1 capsule by mouth daily before lunch    calcium acetate (PHOSLO) 667 mg capsule  Mother No No   Sig: Take 1 capsule (667 mg total) by mouth 3 (three) times a day with meals   cinacalcet (SENSIPAR) 60 MG tablet   No No   Sig: Take 1 tablet (60 mg total) by mouth daily   Patient taking differently: Take 60 mg by mouth every other day    cloNIDine (CATAPRES) 0 1 mg tablet  Self Yes No   Sig: Take 0 1 mg by mouth as needed May add another 0 1 as needed   divalproex sodium (DEPAKOTE ER) 250 mg 24 hr tablet   No No   Sig: Take 1 tablet (250 mg total) by mouth daily For 14 days then stop   doxazosin (CARDURA) 2 mg tablet  Mother No No   Sig: Take 1 tablet (2 mg total) by mouth daily at bedtime   Patient taking differently: Take 2 mg by mouth daily    escitalopram (LEXAPRO) 10 mg tablet   No No   Sig: Take 1 tablet (10 mg total) by mouth daily   famotidine (PEPCID) 40 MG tablet   No No   Sig: Take 1 tablet (40 mg total) by mouth 2 (two) times a day   gabapentin (NEURONTIN) 100 mg capsule   Yes No   Sig: Take 100 mg by mouth daily at bedtime   gentamicin (GARAMYCIN) 0 1 % cream   No No   Sig: APPLY TO EXIT SITE ONCE DAILY   hydrALAZINE (APRESOLINE) 100 MG tablet   No No   Sig: Take 0 5 tablets (50 mg total) by mouth 3 (three) times a day   insulin NPH (HumuLIN N,NovoLIN N) 100 Units/mL subcutaneous injection   Yes No   Sig: Inject 4 Units under the skin daily at bedtime   insulin glargine (LANTUS) 100 units/mL subcutaneous injection   Yes No   Sig: Inject 5 Units under the skin daily after breakfast   insulin lispro (Admelog SoloStar) 100 units/mL injection pen   No No   Sig: Take 4 units with breakfast, 2 units with lunch, and 4 units with dinner   labetalol (NORMODYNE) 300 mg tablet  Mother No No   Sig: Take 1 tablet (300 mg total) by mouth every 12 (twelve) hours   Patient taking differently: Take 300 mg by mouth see administration instructions Take 1 tablet at 6AM and second tablet at 9PM   lisinopril (ZESTRIL) 40 mg tablet  Mother Yes No   Sig: Take 40 mg by mouth daily   metoclopramide (REGLAN) 5 mg tablet   No No   Sig: Take 1 tablet (5 mg total) by mouth 2 (two) times a day as needed (May take up two 2 doses as needed for nausea and vomiting if Zofran has not worked)   metolazone (ZAROXOLYN) 10 mg tablet   Yes No   Sig: Take 10 mg by mouth daily Take 1/2 an hour before lunch   ondansetron (ZOFRAN) 4 mg tablet   No No   Sig: Take 1 tablet (4 mg total) by mouth every 8 (eight) hours as needed for nausea or vomiting   Patient taking differently: Take 4 mg by mouth every 8 (eight) hours as needed for nausea or vomiting    torsemide (DEMADEX) 100 mg tablet  Mother Yes No   Sig: Take 100 mg by mouth 2 (two) times a day       Facility-Administered Medications: None       Past Medical History:   Diagnosis Date    [facility] [address] [facility]     Anxiety     Cerebellar stroke side undetermined 2015 2015,1/2018    Diabetes type 1, controlled ([facility] [address] )     IDDM    Diarrhea     Gastroparesis     History of shingles 2010    History of transfusion 02/2018    Hypertension     Muscle weakness     general unsteadiness    Retinopathy        Past Surgical History:   Procedure Laterality Date    CARDIAC LOOP RECORDER  05/2018    EGD      EYE SURGERY      PERITONEAL CATHETER INSERTION N/A 8/27/2018    Procedure: UNROOF PD CATHETER;  Surgeon: Danielle Justin DO;  [facility];  Service: General    LA ESOPHAGOGASTRODUODENOSCOPY TRANSORAL DIAGNOSTIC N/A 2019    Procedure: ESOPHAGOGASTRODUODENOSCOPY (EGD); Surgeon: Beryle Homme, MD;  [facility]; Service: Gastroenterology    DE LAP INSERTION TUNNELED INTRAPERITONEAL CATHETER N/A 2018    Procedure: LAPAROSCOPIC PD CATHETER PLACEMENT;  Surgeon: Krishna Quevedo DO;  [facility];  Service: General    TONSILLECTOMY         Family History   Problem Relation Age of Onset   [address] [facility] Breast cancer Mother     Hypertension Mother     Hyperlipidemia Father     Hypertension Father     Leukemia Maternal Grandmother     Hyperlipidemia Maternal Grandfather     Hypertension Maternal Grandfather     Hyperlipidemia Paternal Grandmother     Hypertension Paternal Grandmother     Heart disease Paternal Grandfather         cardiac disorder    Diabetes Paternal Grandfather      I have reviewed and agree with the history as documented      E-Cigarette/Vaping    E-Cigarette Use Never User      E-Cigarette/Vaping Substances    Nicotine No     THC No     CBD No     Flavoring No     Other No     Unknown No      Social History     Tobacco Use    Smoking status: Former Smoker     Packs/day: 0 50     Years: 12 00     Pack years: 6 00     Types: Cigarettes     Last attempt to quit: 2018     Years since quittin 6    Smokeless tobacco: Former User    Tobacco comment: quit 2018   Substance Use Topics    Alcohol use: Not Currently     Comment: rarely    Drug use: Yes     Frequency: 5 0 times per week     Types: Marijuana     Comment: medical        Review of Systems    Physical Exam  ED Triage Vitals   Temperature Pulse Respirations Blood Pressure SpO2   20   98 9 °F (37 2 °C) 88 18 (!) 207/82 100 %      Temp src Heart Rate Source Patient Position - Orthostatic VS BP Location FiO2 (%)   -- 20 --    Monitor Sitting Right arm       Pain Score 09/08/20 1913       3             Orthostatic Vital Signs  Vitals:    09/08/20 1910   BP: (!) 207/82   Pulse: 88   Patient Position - Orthostatic VS: Sitting       Physical Exam    ED Medications  Medications - No data to display    Diagnostic Studies  Results Reviewed     Procedure Component Value Units Date/Time    POCT alcohol breath test [[id number]]  (Normal) Resulted:  09/08/20 2001    Lab Status:  Final result Updated:  09/08/20 2001     EXTBreath Alcohol 0 000    Rapid drug screen, urine [[id number]]     Lab Status:  No result Specimen:  Urine            patient is unable to provide a urine drug screen as he is a a kidney failure patient and is on dialysis and does not produce urine  No orders to display         Procedures  Procedures      ED Course                                       MDM  Number of Diagnoses or Management Options  Diagnosis management comments: Patient saw the and spoke with crisis he is cleared from their perspective is clear for discharge and they will set helps resources for outpatient services  Disposition  Final diagnoses:   None     ED Disposition     None      Follow-up Information    None         Patient's Medications   Discharge Prescriptions    No medications on file     No discharge procedures on file  PDMP Review       Value Time User    PDMP Reviewed  Yes 4/19/2020 10:30 PM Michelle Conti MD           ED Provider  Attending physically available and evaluated Nikos Needs  I managed the patient along with the ED Attending      Electronically Signed by         Ximena Culver DO  09/08/20 3263

## 2020-09-14 ENCOUNTER — HOSPITAL ENCOUNTER (OUTPATIENT)
Dept: NEUROLOGY | Facility: CLINIC | Age: 35
Discharge: HOME | End: 2020-09-14
Payer: MEDICARE

## 2020-09-14 DIAGNOSIS — R56.9 PROVOKED SEIZURE (HCC): ICD-10-CM

## 2020-09-14 PROCEDURE — 95816 EEG AWAKE AND DROWSY: CPT | Performed by: PSYCHIATRY & NEUROLOGY

## 2020-09-14 PROCEDURE — 95816 EEG AWAKE AND DROWSY: CPT

## 2020-09-16 ENCOUNTER — TELEPHONE (OUTPATIENT)
Dept: NEUROLOGY | Facility: CLINIC | Age: 35
End: 2020-09-16

## 2020-09-16 NOTE — TELEPHONE ENCOUNTER
----- Message from Fransisca Luke MD sent at 9/16/2020 12:32 PM EDT -----  No epileptiform discharges, no inherent risk for recurrent seizures, will not restart Depakote or antiseizure medication unless he has another unprovoked seizure

## 2020-09-30 ENCOUNTER — OFFICE VISIT (OUTPATIENT)
Dept: INTERNAL MEDICINE CLINIC | Facility: CLINIC | Age: 35
End: 2020-09-30
Payer: MEDICARE

## 2020-09-30 ENCOUNTER — OFFICE VISIT (OUTPATIENT)
Dept: GASTROENTEROLOGY | Facility: AMBULARY SURGERY CENTER | Age: 35
End: 2020-09-30
Payer: MEDICARE

## 2020-09-30 VITALS
HEART RATE: 91 BPM | RESPIRATION RATE: 18 BRPM | SYSTOLIC BLOOD PRESSURE: 148 MMHG | WEIGHT: 230.2 LBS | DIASTOLIC BLOOD PRESSURE: 86 MMHG | BODY MASS INDEX: 32.96 KG/M2 | HEIGHT: 70 IN | TEMPERATURE: 98.4 F | OXYGEN SATURATION: 98 %

## 2020-09-30 VITALS — BODY MASS INDEX: 31.98 KG/M2 | WEIGHT: 223.4 LBS | TEMPERATURE: 97.7 F | HEIGHT: 70 IN

## 2020-09-30 DIAGNOSIS — K31.84 GASTROPARESIS: Primary | ICD-10-CM

## 2020-09-30 DIAGNOSIS — R19.7 DIARRHEA, UNSPECIFIED TYPE: ICD-10-CM

## 2020-09-30 DIAGNOSIS — F32.1 CURRENT MODERATE EPISODE OF MAJOR DEPRESSIVE DISORDER WITHOUT PRIOR EPISODE (HCC): ICD-10-CM

## 2020-09-30 DIAGNOSIS — Z00.00 WELCOME TO MEDICARE PREVENTIVE VISIT: Primary | ICD-10-CM

## 2020-09-30 DIAGNOSIS — Z23 NEED FOR INFLUENZA VACCINATION: ICD-10-CM

## 2020-09-30 PROCEDURE — G0008 ADMIN INFLUENZA VIRUS VAC: HCPCS

## 2020-09-30 PROCEDURE — 99214 OFFICE O/P EST MOD 30 MIN: CPT | Performed by: INTERNAL MEDICINE

## 2020-09-30 PROCEDURE — G0403 EKG FOR INITIAL PREVENT EXAM: HCPCS | Performed by: INTERNAL MEDICINE

## 2020-09-30 PROCEDURE — G0402 INITIAL PREVENTIVE EXAM: HCPCS | Performed by: INTERNAL MEDICINE

## 2020-09-30 PROCEDURE — 90682 RIV4 VACC RECOMBINANT DNA IM: CPT

## 2020-09-30 RX ORDER — HYDRALAZINE HYDROCHLORIDE 50 MG/1
TABLET, FILM COATED ORAL
COMMUNITY
Start: 2020-09-23 | End: 2020-10-26 | Stop reason: HOSPADM

## 2020-09-30 RX ORDER — OXYCODONE HYDROCHLORIDE AND ACETAMINOPHEN 5; 325 MG/1; MG/1
TABLET ORAL
COMMUNITY
End: 2020-11-06

## 2020-09-30 NOTE — ASSESSMENT & PLAN NOTE
-patient removed from transplant list and will need letter reinstating him  -refer to Ching Guillen for earlier appt

## 2020-09-30 NOTE — PROGRESS NOTES
Follow-up Note -  [facility]  Nura Burns 1985 male         Reason:  Hospital follow-up    HPI:  Mr Isauro Hanna, pt of Dr Marta Ramey, had upper endoscopy with Botox injections into the pylorus was done last month when he was hospitalized  He reports doing well except couple of episodes of nausea and vomiting in a week when he eats certain foods like salads  Denies any abdominal pain  Good appetite, no recent weight loss  He reports having issues with chronic diarrhea for almost about a year  Describes as multiple loose bowel movements  Denies any blood or mucus in the stool  REVIEW OF SYSTEMS: Review of Systems   Constitutional: Negative for activity change, appetite change, chills, diaphoresis, fatigue, fever and unexpected weight change  HENT: Negative for ear discharge, ear pain, facial swelling, hearing loss, nosebleeds, sore throat, tinnitus and voice change  Eyes: Negative for pain, discharge, redness, itching and visual disturbance  Respiratory: Negative for apnea, cough, chest tightness, shortness of breath and wheezing  Cardiovascular: Negative for chest pain and palpitations  Gastrointestinal:        As noted in HPI   Endocrine: Negative for cold intolerance, heat intolerance and polyuria  Genitourinary: Negative for difficulty urinating, dysuria, flank pain, hematuria and urgency  Musculoskeletal: Negative for arthralgias, back pain, gait problem, joint swelling and myalgias  Skin: Negative for rash and wound  Neurological: Negative for dizziness, tremors, seizures, speech difficulty, light-headedness, numbness and headaches  Hematological: Negative for adenopathy  Does not bruise/bleed easily  Psychiatric/Behavioral: Negative for agitation, behavioral problems and confusion  The patient is not nervous/anxious           Past Medical History:   Diagnosis Date    [facility] [address] [facility]     Anxiety     Cerebellar stroke side undetermined 2015 ,2018    Diabetes type [facility], controlled ([facility] [address] [facility]     IDDM    Diarrhea     Gastroparesis     History of shingles 2010    History of transfusion 2018    Hypertension     Muscle weakness     general unsteadiness    Retinopathy       Past Surgical History:   Procedure Laterality Date    CARDIAC LOOP RECORDER  2018    EGD      EYE SURGERY      PERITONEAL CATHETER INSERTION N/A 2018    Procedure: UNROOF PD CATHETER;  Surgeon: Andres Miller DO;  [facility];  Service: General    IN ESOPHAGOGASTRODUODENOSCOPY TRANSORAL DIAGNOSTIC N/A 2019    Procedure: ESOPHAGOGASTRODUODENOSCOPY (EGD); Surgeon: Fidelina Lopez MD;  [facility];   Service: Gastroenterology    IN LAP INSERTION TUNNELED INTRAPERITONEAL CATHETER N/A 2018    Procedure: LAPAROSCOPIC PD CATHETER PLACEMENT;  Surgeon: Andres Miller DO;  [facility];  Service: General    TONSILLECTOMY      UPPER GASTROINTESTINAL ENDOSCOPY       Social History     Socioeconomic History    Marital status: Single     Spouse name: Not on file    Number of children: Not on file    Years of education: Not on file    Highest education level: Not on file   Occupational History    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   Social Needs    Financial resource strain: Not on file    Food insecurity     Worry: Not on file     Inability: Not on file    Transportation needs     Medical: No     Non-medical: No   Tobacco Use    Smoking status: Former Smoker     Packs/day: 0 50     Years: 12 00     Pack years: 6 00     Types: Cigarettes     Last attempt to quit: 2018     Years since quittin 6    Smokeless tobacco: Former User    Tobacco comment: quit 2018   Substance and Sexual Activity    Alcohol use: Not Currently     Comment: rarely    Drug use: Yes     Frequency: 5 0 times per week     Types: Marijuana     Comment: medical    Sexual activity: Not on file   Lifestyle    Physical activity Days per week: Not on file     Minutes per session: Not on file    Stress: Not on file   Relationships    Social connections     Talks on phone: Not on file     Gets together: Not on file     Attends Sabianism service: Not on file     Active member of club or organization: Not on file     Attends meetings of clubs or organizations: Not on file     Relationship status: Not on file    Intimate partner violence     Fear of current or ex partner: Not on file     Emotionally abused: Not on file     Physically abused: Not on file     Forced sexual activity: Not on file   Other Topics Concern    Not on file   Social History Narrative    Caffeine use    single     Family History   Problem Relation Age of Onset    Breast cancer Mother     Hypertension Mother     Hyperlipidemia Father     Hypertension Father     Leukemia Maternal Grandmother     Hyperlipidemia Maternal Grandfather     Hypertension Maternal Grandfather     Hyperlipidemia Paternal Grandmother     Hypertension Paternal Grandmother     Heart disease Paternal Grandfather         cardiac disorder    Diabetes Paternal Grandfather      Sulfa antibiotics  Current Outpatient Medications   Medication Sig Dispense Refill    aspirin 81 mg chewable tablet Chew 81 mg daily      atorvastatin (LIPITOR) 40 mg tablet Take 1 tablet (40 mg total) by mouth every evening 90 tablet 1    AVASTIN 100 MG/4ML see administration instructions Given by opthalmology      b complex vitamins capsule Take 1 capsule by mouth daily before lunch       B-D ULTRAFINE III SHORT PEN 31G X 8 MM MISC USE 1 PEN NEEDLE 8 TIMES DAILY 100 each 47    calcium acetate (PHOSLO) 667 mg capsule Take 1 capsule (667 mg total) by mouth 3 (three) times a day with meals  0    Cholecalciferol (VITAMIN D) 2000 units CAPS Take 1 capsule by mouth daily      cinacalcet (SENSIPAR) 60 MG tablet Take 1 tablet (60 mg total) by mouth daily (Patient taking differently: Take 60 mg by mouth every other day )      cloNIDine (CATAPRES) 0 1 mg tablet Take 0 1 mg by mouth as needed May add another 0 1 as needed      divalproex sodium (DEPAKOTE ER) 250 mg 24 hr tablet Take 1 tablet (250 mg total) by mouth daily For 14 days then stop 14 tablet 0    doxazosin (CARDURA) 2 mg tablet Take 1 tablet (2 mg total) by mouth daily at bedtime (Patient taking differently: Take 2 mg by mouth daily ) 30 tablet 0    escitalopram (LEXAPRO) 10 mg tablet Take 1 tablet (10 mg total) by mouth daily 90 tablet 2    famotidine (PEPCID) 40 MG tablet Take 1 tablet (40 mg total) by mouth 2 (two) times a day 60 tablet 2    gabapentin (NEURONTIN) 100 mg capsule Take 100 mg by mouth daily at bedtime  1    gentamicin (GARAMYCIN) 0 1 % cream APPLY TO EXIT SITE ONCE DAILY 30 g 2    GLUCAGON EMERGENCY 1 MG injection INJECT 1MG SUBCUTANEOUSLY AS NEEDED (AS DIRECTED)   MAY REPEAT DOSE EVERY 20 MINUTES AS NEEDED  3    hydrALAZINE (APRESOLINE) 100 MG tablet Take 0 5 tablets (50 mg total) by mouth 3 (three) times a day      hydrALAZINE (APRESOLINE) 50 mg tablet TAKE 1 TABLET BY MOUTH IN THE MORNING AND 2 TABLETS WITH LUNCH AND 1 TABLET AT BEDTIME      insulin glargine (LANTUS) 100 units/mL subcutaneous injection Inject 5 Units under the skin daily after breakfast      insulin lispro (Admelog SoloStar) 100 units/mL injection pen Take 4 units with breakfast, 2 units with lunch, and 4 units with dinner 3 mL 0    insulin NPH (HumuLIN N,NovoLIN N) 100 Units/mL subcutaneous injection Inject 4 Units under the skin daily at bedtime      labetalol (NORMODYNE) 300 mg tablet Take 1 tablet (300 mg total) by mouth every 12 (twelve) hours (Patient taking differently: Take 300 mg by mouth see administration instructions Take 1 tablet at 6AM and second tablet at 9PM) 60 tablet 0    lisinopril (ZESTRIL) 40 mg tablet Take 40 mg by mouth daily      metoclopramide (REGLAN) 5 mg tablet Take 1 tablet (5 mg total) by mouth 2 (two) times a day as needed (May take up two 2 doses as needed for nausea and vomiting if Zofran has not worked) 20 tablet 1    metolazone (ZAROXOLYN) 10 mg tablet Take 10 mg by mouth daily Take 1/2 an hour before lunch      NIFEdipine ER (ADALAT CC) 60 MG 24 hr tablet Take 1 tablet (60 mg total) by mouth 2 (two) times a day 180 tablet 0    oxyCODONE-acetaminophen (PERCOCET) 5-325 mg per tablet oxycodone-acetaminophen 5 mg-325 mg tablet   TAKE 1 TABLET BY MOUTH EVERY 6 HOURS AS NEEDED      Probiotic Product (PROBIOTIC DAILY) CAPS Take 1 capsule by mouth daily at bedtime       Sucroferric Oxyhydroxide (VELPHORO PO) 500 mg 3 (three) times a day with meals       torsemide (DEMADEX) 100 mg tablet Take 100 mg by mouth 2 (two) times a day   11    Velphoro 500 MG CHEW CHEW AND SWALLOW 1 TABLET BY MOUTH WITH MEALS (UP TO THREE TIMES DAILY)   bisacodyl (DULCOLAX) 5 mg EC tablet Take 2 tablets (10 mg total) by mouth once for 1 dose 2 tablet 0    ondansetron (ZOFRAN) 4 mg tablet Take 1 tablet (4 mg total) by mouth every 8 (eight) hours as needed for nausea or vomiting (Patient taking differently: Take 4 mg by mouth every 8 (eight) hours as needed for nausea or vomiting ) 30 tablet 0    polyethylene glycol (GOLYTELY) 4000 mL solution Take 4,000 mL by mouth once for 1 dose 4000 mL 0     No current facility-administered medications for this visit  Temperature 97 7 °F (36 5 °C), temperature source Temporal, height 5' 10" (1 778 m), weight 101 kg (223 lb 6 4 oz)  PHYSICAL EXAM: Physical Exam  Constitutional:       Appearance: He is well-developed  HENT:      Head: Normocephalic and atraumatic  Eyes:      General: No scleral icterus  Right eye: No discharge  Left eye: No discharge  Conjunctiva/sclera: Conjunctivae normal       Pupils: Pupils are equal, round, and reactive to light  Neck:      Musculoskeletal: Neck supple  Thyroid: No thyromegaly  Vascular: No JVD  Trachea: No tracheal deviation     Cardiovascular: Rate and Rhythm: Normal rate and regular rhythm  Heart sounds: Normal heart sounds  No murmur  No friction rub  No gallop  Pulmonary:      Effort: Pulmonary effort is normal  No respiratory distress  Breath sounds: Normal breath sounds  No wheezing or rales  Chest:      Chest wall: No tenderness  Abdominal:      General: Bowel sounds are normal  There is no distension  Palpations: Abdomen is soft  There is no mass  Tenderness: There is no abdominal tenderness  There is no guarding or rebound  Hernia: No hernia is present  Comments: Dialysis catheter in place   Lymphadenopathy:      Cervical: No cervical adenopathy  Skin:     General: Skin is warm and dry  Findings: No erythema or rash  Neurological:      Mental Status: He is alert and oriented to person, place, and time  Psychiatric:         Behavior: Behavior normal          Thought Content: Thought content normal           Lab Results   Component Value Date    WBC 5 19 08/06/2020    HGB 8 3 (L) 08/06/2020    HCT 25 0 (L) 08/06/2020    MCV 96 08/06/2020     08/06/2020     Lab Results   Component Value Date    GLUCOSE 275 (H) 02/21/2020    CALCIUM 9 0 08/06/2020     10/28/2015    K 3 7 08/06/2020    CO2 32 08/06/2020    CL 96 (L) 08/06/2020    BUN 29 (H) 08/06/2020    CREATININE 12 88 (H) 08/06/2020     Lab Results   Component Value Date    ALT 15 08/01/2020    AST 15 08/01/2020    ALKPHOS 65 08/01/2020    BILITOT 0 49 10/28/2015     Lab Results   Component Value Date    INR 1 19 06/19/2020    INR 1 12 06/19/2020    INR 1 11 04/19/2020    PROTIME 14 5 06/19/2020    PROTIME 13 8 06/19/2020    PROTIME 13 7 04/19/2020       No results found  ASSESSMENT & PLAN:    Gastroparesis  History of diabetic gastroparesis status post Botox injection to the pylorus    Patient reports doing well as long as he watches his diet     -advised him about small frequent low-fat, low residue diet    -may take Zofran as needed    -discussed about other medications Reglan and domperidone and the probable side effects  Diarrhea  Appear to most likely functional or due to diabetic autonomic neuropathy  Rule out infectious etiology or IBD    -check stool studies including pancreatic elastase    -Schedule for colonoscopy  -Patient was given instructions about the colonoscopy prep     -Patient was explained about  the risks and benefits of the procedure  Risks including but not limited to bleeding, infection, perforation were explained in detail  Also explained about less than 100% sensitivity with the exam and other alternatives

## 2020-09-30 NOTE — PATIENT INSTRUCTIONS
Medicare Preventive Visit Patient Instructions  Thank you for completing your Welcome to Medicare Visit or Medicare Annual Wellness Visit today  Your next wellness visit will be due in one year (9/30/2021)  The screening/preventive services that you may require over the next 5-10 years are detailed below  Some tests may not apply to you based off risk factors and/or age  Screening tests ordered at today's visit but not completed yet may show as past due  Also, please note that scanned in results may not display below  Preventive Screenings:  Service Recommendations Previous Testing/Comments   Colorectal Cancer Screening  · Colonoscopy    · Fecal Occult Blood Test (FOBT)/Fecal Immunochemical Test (FIT)  · Fecal DNA/Cologuard Test  · Flexible Sigmoidoscopy Age: 54-65 years old   Colonoscopy: every 10 years (May be performed more frequently if at higher risk)  OR  FOBT/FIT: every 1 year  OR  Cologuard: every 3 years  OR  Sigmoidoscopy: every 5 years  Screening may be recommended earlier than age 48 if at higher risk for colorectal cancer  Also, an individualized decision between you and your healthcare provider will decide whether screening between the ages of 74-80 would be appropriate   Colonoscopy: Not on file  FOBT/FIT: 02/14/2018  Cologuard: Not on file  Sigmoidoscopy: Not on file         Prostate Cancer Screening Individualized decision between patient and health care provider in men between ages of 53-78   Medicare will cover every 12 months beginning on the day after your 50th birthday PSA: No results in last 5 years     Screening Not Indicated     Hepatitis C Screening Once for adults born between 1945 and 1965  More frequently in patients at high risk for Hepatitis C Hep C Antibody: 08/01/2018    Screening Current   Diabetes Screening 1-2 times per year if you're at risk for diabetes or have pre-diabetes Fasting glucose: 109 mg/dL   A1C: 6 8 %    Screening Not Indicated  History Diabetes   Cholesterol Screening Once every 5 years if you don't have a lipid disorder  May order more often based on risk factors  Lipid panel: 02/21/2020    Screening Current      Other Preventive Screenings Covered by Medicare:  1  Abdominal Aortic Aneurysm (AAA) Screening: covered once if your at risk  You're considered to be at risk if you have a family history of AAA or a male between the age of 73-68 who smoking at least 100 cigarettes in your lifetime  2  Lung Cancer Screening: covers low dose CT scan once per year if you meet all of the following conditions: (1) Age 50-69; (2) No signs or symptoms of lung cancer; (3) Current smoker or have quit smoking within the last 15 years; (4) You have a tobacco smoking history of at least 30 pack years (packs per day x number of years you smoked); (5) You get a written order from a healthcare provider  3  Glaucoma Screening: covered annually if you're considered high risk: (1) You have diabetes OR (2) Family history of glaucoma OR (3)  aged 48 and older OR (3)  American aged 72 and older  3  Osteoporosis Screening: covered every 2 years if you meet one of the following conditions: (1) Have a vertebral abnormality; (2) On glucocorticoid therapy for more than 3 months; (3) Have primary hyperparathyroidism; (4) On osteoporosis medications and need to assess response to drug therapy  5  HIV Screening: covered annually if you're between the age of 12-76  Also covered annually if you are younger than 13 and older than 72 with risk factors for HIV infection  For pregnant patients, it is covered up to 3 times per pregnancy      Immunizations:  Immunization Recommendations   Influenza Vaccine Annual influenza vaccination during flu season is recommended for all persons aged >= 6 months who do not have contraindications   Pneumococcal Vaccine (Prevnar and Pneumovax)  * Prevnar = PCV13  * Pneumovax = PPSV23 Adults 25-60 years old: 1-3 doses may be recommended based on certain risk factors  Adults 72 years old: Prevnar (PCV13) vaccine recommended followed by Pneumovax (PPSV23) vaccine  If already received PPSV23 since turning 65, then PCV13 recommended at least one year after PPSV23 dose  Hepatitis B Vaccine 3 dose series if at intermediate or high risk (ex: diabetes, end stage renal disease, liver disease)   Tetanus (Td) Vaccine - COST NOT COVERED BY MEDICARE PART B Following completion of primary series, a booster dose should be given every 10 years to maintain immunity against tetanus  Td may also be given as tetanus wound prophylaxis  Tdap Vaccine - COST NOT COVERED BY MEDICARE PART B Recommended at least once for all adults  For pregnant patients, recommended with each pregnancy  Shingles Vaccine (Shingrix) - COST NOT COVERED BY MEDICARE PART B  2 shot series recommended in those aged 48 and above     Health Maintenance Due:      Topic Date Due    Hepatitis C Screening  Completed     Immunizations Due:      Topic Date Due    Pneumococcal Vaccine: Pediatrics (0 to 5 Years) and At-Risk Patients (6 to 59 Years) (3 of 3 - PCV13) 03/08/2020    Influenza Vaccine  07/01/2020     Advance Directives   What are advance directives? Advance directives are legal documents that state your wishes and plans for medical care  These plans are made ahead of time in case you lose your ability to make decisions for yourself  Advance directives can apply to any medical decision, such as the treatments you want, and if you want to donate organs  What are the types of advance directives? There are many types of advance directives, and each state has rules about how to use them  You may choose a combination of any of the following:  · Living will: This is a written record of the treatment you want  You can also choose which treatments you do not want, which to limit, and which to stop at a certain time  This includes surgery, medicine, IV fluid, and tube feedings     · Durable power of  for healthcare [facility]): This is a written record that states who you want to make healthcare choices for you when you are unable to make them for yourself  This person, called a proxy, is usually a family member or a friend  You may choose more than 1 proxy  · Do not resuscitate (DNR) order:  A DNR order is used in case your heart stops beating or you stop breathing  It is a request not to have certain forms of treatment, such as CPR  A DNR order may be included in other types of advance directives  · Medical directive: This covers the care that you want if you are in a coma, near death, or unable to make decisions for yourself  You can list the treatments you want for each condition  Treatment may include pain medicine, surgery, blood transfusions, dialysis, IV or tube feedings, and a ventilator (breathing machine)  · Values history: This document has questions about your views, beliefs, and how you feel and think about life  This information can help others choose the care that you would choose  Why are advance directives important? An advance directive helps you control your care  Although spoken wishes may be used, it is better to have your wishes written down  Spoken wishes can be misunderstood, or not followed  Treatments may be given even if you do not want them  An advance directive may make it easier for your family to make difficult choices about your care  Weight Management   Why it is important to manage your weight:  Being overweight increases your risk of health conditions such as heart disease, high blood pressure, type 2 diabetes, and certain types of cancer  It can also increase your risk for osteoarthritis, sleep apnea, and other respiratory problems  Aim for a slow, steady weight loss  Even a small amount of weight loss can lower your risk of health problems  How to lose weight safely:  A safe and healthy way to lose weight is to eat fewer calories and get regular exercise   You can lose up about 1 pound a week by decreasing the number of calories you eat by 500 calories each day  Healthy meal plan for weight management:  A healthy meal plan includes a variety of foods, contains fewer calories, and helps you stay healthy  A healthy meal plan includes the following:  · Eat whole-grain foods more often  A healthy meal plan should contain fiber  Fiber is the part of grains, fruits, and vegetables that is not broken down by your body  Whole-grain foods are healthy and provide extra fiber in your diet  Some examples of whole-grain foods are whole-wheat breads and pastas, oatmeal, brown rice, and bulgur  · Eat a variety of vegetables every day  Include dark, leafy greens such as spinach, kale, shorty greens, and mustard greens  Eat yellow and orange vegetables such as carrots, sweet potatoes, and winter squash  · Eat a variety of fruits every day  Choose fresh or canned fruit (canned in its own juice or light syrup) instead of juice  Fruit juice has very little or no fiber  · Eat low-fat dairy foods  Drink fat-free (skim) milk or 1% milk  Eat fat-free yogurt and low-fat cottage cheese  Try low-fat cheeses such as mozzarella and other reduced-fat cheeses  · Choose meat and other protein foods that are low in fat  Choose beans or other legumes such as split peas or lentils  Choose fish, skinless poultry (chicken or turkey), or lean cuts of red meat (beef or pork)  Before you cook meat or poultry, cut off any visible fat  · Use less fat and oil  Try baking foods instead of frying them  Add less fat, such as margarine, sour cream, regular salad dressing and mayonnaise to foods  Eat fewer high-fat foods  Some examples of high-fat foods include french fries, doughnuts, ice cream, and cakes  · Eat fewer sweets  Limit foods and drinks that are high in sugar  This includes candy, cookies, regular soda, and sweetened drinks    Exercise:  Exercise at least 30 minutes per day on most days of the week  Some examples of exercise include walking, biking, dancing, and swimming  You can also fit in more physical activity by taking the stairs instead of the elevator or parking farther away from stores  Ask your healthcare provider about the best exercise plan for you  © Copyright [employer] 2018 Information is for End User's use only and may not be sold, redistributed or otherwise used for commercial purposes  All illustrations and images included in CareNotes® are the copyrighted property of [employer]  or [facility] Preventive Visit Patient Instructions  Thank you for completing your Welcome to Medicare Visit or Medicare Annual Wellness Visit today  Your next wellness visit will be due in one year (9/30/2021)  The screening/preventive services that you may require over the next 5-10 years are detailed below  Some tests may not apply to you based off risk factors and/or age  Screening tests ordered at today's visit but not completed yet may show as past due  Also, please note that scanned in results may not display below  Preventive Screenings:  Service Recommendations Previous Testing/Comments   Colorectal Cancer Screening  · Colonoscopy    · Fecal Occult Blood Test (FOBT)/Fecal Immunochemical Test (FIT)  · Fecal DNA/Cologuard Test  · Flexible Sigmoidoscopy Age: 54-65 years old   Colonoscopy: every 10 years (May be performed more frequently if at higher risk)  OR  FOBT/FIT: every 1 year  OR  Cologuard: every 3 years  OR  Sigmoidoscopy: every 5 years  Screening may be recommended earlier than age 48 if at higher risk for colorectal cancer  Also, an individualized decision between you and your healthcare provider will decide whether screening between the ages of 74-80 would be appropriate   Colonoscopy: Not on file  FOBT/FIT: 02/14/2018  Cologuard: Not on file  Sigmoidoscopy: Not on file    Screening Not Indicated     Prostate Cancer Screening Individualized decision between patient and health care provider in men between ages of 53-78   Medicare will cover every 12 months beginning on the day after your 50th birthday PSA: No results in last 5 years     Screening Not Indicated     Hepatitis C Screening Once for adults born between 1945 and 1965  More frequently in patients at high risk for Hepatitis C Hep C Antibody: 08/01/2018    Screening Current   Diabetes Screening 1-2 times per year if you're at risk for diabetes or have pre-diabetes Fasting glucose: 109 mg/dL   A1C: 6 8 %    Screening Not Indicated  History Diabetes  Risks and Benefits Discussed  Screening Current   Cholesterol Screening Once every 5 years if you don't have a lipid disorder  May order more often based on risk factors  Lipid panel: 02/21/2020    Screening Current  Risks and Benefits Discussed      Other Preventive Screenings Covered by Medicare:  6  Abdominal Aortic Aneurysm (AAA) Screening: covered once if your at risk  You're considered to be at risk if you have a family history of AAA or a male between the age of 73-68 who smoking at least 100 cigarettes in your lifetime  7  Lung Cancer Screening: covers low dose CT scan once per year if you meet all of the following conditions: (1) Age 50-69; (2) No signs or symptoms of lung cancer; (3) Current smoker or have quit smoking within the last 15 years; (4) You have a tobacco smoking history of at least 30 pack years (packs per day x number of years you smoked); (5) You get a written order from a healthcare provider  8  Glaucoma Screening: covered annually if you're considered high risk: (1) You have diabetes OR (2) Family history of glaucoma OR (3)  aged 48 and older OR (3)  American aged 72 and older  5   Osteoporosis Screening: covered every 2 years if you meet one of the following conditions: (1) Have a vertebral abnormality; (2) On glucocorticoid therapy for more than 3 months; (3) Have primary hyperparathyroidism; (4) On osteoporosis medications and need to assess response to drug therapy  10  HIV Screening: covered annually if you're between the age of 12-76  Also covered annually if you are younger than 13 and older than 72 with risk factors for HIV infection  For pregnant patients, it is covered up to 3 times per pregnancy  Immunizations:  Immunization Recommendations   Influenza Vaccine Annual influenza vaccination during flu season is recommended for all persons aged >= 6 months who do not have contraindications   Pneumococcal Vaccine (Prevnar and Pneumovax)  * Prevnar = PCV13  * Pneumovax = PPSV23 Adults 25-60 years old: 1-3 doses may be recommended based on certain risk factors  Adults 72 years old: Prevnar (PCV13) vaccine recommended followed by Pneumovax (PPSV23) vaccine  If already received PPSV23 since turning 65, then PCV13 recommended at least one year after PPSV23 dose  Hepatitis B Vaccine 3 dose series if at intermediate or high risk (ex: diabetes, end stage renal disease, liver disease)   Tetanus (Td) Vaccine - COST NOT COVERED BY MEDICARE PART B Following completion of primary series, a booster dose should be given every 10 years to maintain immunity against tetanus  Td may also be given as tetanus wound prophylaxis  Tdap Vaccine - COST NOT COVERED BY MEDICARE PART B Recommended at least once for all adults  For pregnant patients, recommended with each pregnancy  Shingles Vaccine (Shingrix) - COST NOT COVERED BY MEDICARE PART B  2 shot series recommended in those aged 48 and above     Health Maintenance Due:      Topic Date Due    Hepatitis C Screening  Completed     Immunizations Due:      Topic Date Due    Pneumococcal Vaccine: Pediatrics (0 to 5 Years) and At-Risk Patients (6 to 59 Years) (3 of 3 - PCV13) 03/08/2020    Influenza Vaccine  07/01/2020     Advance Directives   What are advance directives? Advance directives are legal documents that state your wishes and plans for medical care   These plans are made ahead of time in case you lose your ability to make decisions for yourself  Advance directives can apply to any medical decision, such as the treatments you want, and if you want to donate organs  What are the types of advance directives? There are many types of advance directives, and each state has rules about how to use them  You may choose a combination of any of the following:  · Living will: This is a written record of the treatment you want  You can also choose which treatments you do not want, which to limit, and which to stop at a certain time  This includes surgery, medicine, IV fluid, and tube feedings  · Durable power of  for healthcare [facility]): This is a written record that states who you want to make healthcare choices for you when you are unable to make them for yourself  This person, called a proxy, is usually a family member or a friend  You may choose more than 1 proxy  · Do not resuscitate (DNR) order:  A DNR order is used in case your heart stops beating or you stop breathing  It is a request not to have certain forms of treatment, such as CPR  A DNR order may be included in other types of advance directives  · Medical directive: This covers the care that you want if you are in a coma, near death, or unable to make decisions for yourself  You can list the treatments you want for each condition  Treatment may include pain medicine, surgery, blood transfusions, dialysis, IV or tube feedings, and a ventilator (breathing machine)  · Values history: This document has questions about your views, beliefs, and how you feel and think about life  This information can help others choose the care that you would choose  Why are advance directives important? An advance directive helps you control your care  Although spoken wishes may be used, it is better to have your wishes written down  Spoken wishes can be misunderstood, or not followed   Treatments may be given even if you do not want them  An advance directive may make it easier for your family to make difficult choices about your care  Weight Management   Why it is important to manage your weight:  Being overweight increases your risk of health conditions such as heart disease, high blood pressure, type 2 diabetes, and certain types of cancer  It can also increase your risk for osteoarthritis, sleep apnea, and other respiratory problems  Aim for a slow, steady weight loss  Even a small amount of weight loss can lower your risk of health problems  How to lose weight safely:  A safe and healthy way to lose weight is to eat fewer calories and get regular exercise  You can lose up about 1 pound a week by decreasing the number of calories you eat by 500 calories each day  Healthy meal plan for weight management:  A healthy meal plan includes a variety of foods, contains fewer calories, and helps you stay healthy  A healthy meal plan includes the following:  · Eat whole-grain foods more often  A healthy meal plan should contain fiber  Fiber is the part of grains, fruits, and vegetables that is not broken down by your body  Whole-grain foods are healthy and provide extra fiber in your diet  Some examples of whole-grain foods are whole-wheat breads and pastas, oatmeal, brown rice, and bulgur  · Eat a variety of vegetables every day  Include dark, leafy greens such as spinach, kale, shorty greens, and mustard greens  Eat yellow and orange vegetables such as carrots, sweet potatoes, and winter squash  · Eat a variety of fruits every day  Choose fresh or canned fruit (canned in its own juice or light syrup) instead of juice  Fruit juice has very little or no fiber  · Eat low-fat dairy foods  Drink fat-free (skim) milk or 1% milk  Eat fat-free yogurt and low-fat cottage cheese  Try low-fat cheeses such as mozzarella and other reduced-fat cheeses  · Choose meat and other protein foods that are low in fat    Choose beans or other legumes such as split peas or lentils  Choose fish, skinless poultry (chicken or turkey), or lean cuts of red meat (beef or pork)  Before you cook meat or poultry, cut off any visible fat  · Use less fat and oil  Try baking foods instead of frying them  Add less fat, such as margarine, sour cream, regular salad dressing and mayonnaise to foods  Eat fewer high-fat foods  Some examples of high-fat foods include french fries, doughnuts, ice cream, and cakes  · Eat fewer sweets  Limit foods and drinks that are high in sugar  This includes candy, cookies, regular soda, and sweetened drinks  Exercise:  Exercise at least 30 minutes per day on most days of the week  Some examples of exercise include walking, biking, dancing, and swimming  You can also fit in more physical activity by taking the stairs instead of the elevator or parking farther away from stores  Ask your healthcare provider about the best exercise plan for you  © Copyright [employer] 2018 Information is for End User's use only and may not be sold, redistributed or otherwise used for commercial purposes   All illustrations and images included in CareNotes® are the copyrighted property of A [employer] A M [employer]  or [address] [place] [address]

## 2020-09-30 NOTE — PROGRESS NOTES
Assessment and Plan:     Problem List Items Addressed This Visit        Other    Current moderate episode of major depressive disorder without prior episode ([address] )     -patient removed from transplant list and will need letter reinstating him  -refer to Celine Centeno for earlier appt         Relevant Orders    Ambulatory referral to Psychiatry      Other Visit Diagnoses     Welcome to Medicare preventive visit    -  Primary    Relevant Orders    POCT ECG (Completed)    Need for influenza vaccination        Relevant Orders    influenza vaccine, quadrivalent, recombinant, PF, 0 5 mL, for patients 18 yr+ (FLUBLOK) (Completed)        BMI Counseling: Body mass index is 33 03 kg/m²  The BMI is above normal  Nutrition recommendations include decreasing portion sizes, consuming healthier snacks, limiting drinks that contain sugar, moderation in carbohydrate intake and reducing intake of cholesterol  Exercise recommendations include exercising 3-5 times per week and strength training exercises  Preventive health issues were discussed with patient, and age appropriate screening tests were ordered as noted in patient's After Visit Summary  Personalized health advice and appropriate referrals for health education or preventive services given if needed, as noted in patient's After Visit Summary  History of Present Illness:     Patient presents for Medicare Annual Wellness visit  He is not scheduled with a therapist until 11/12/20    He has been removed from transplant list due to his ER visit for depression    Patient Care Team:  Maddy De La Rosa,  as PCP - [address] [place] [address], DO as PCP - [facility],[address] [facility])  Soo Haji MD (Neurology)  Fritz Cruz MD (Ophthalmology)  Jerod Boyce MD (Nephrology)  Barbara Valencia MD (Gastroenterology)  Hermann Patterson MD (Internal Medicine)  Meaghan Morillo MD (Transplant)  Freida Luke DPM (Podiatry)     Problem List:     Patient Active Problem List   Diagnosis  Hypertensive urgency    Type 1 diabetes mellitus with diabetic gastropathy (HCC)    History of lacunar cerebrovascular accident (CVA)    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,      Binocular visual disturbance    Vitamin D deficiency    Homozygous MTHFR mutation [id number] (HCC)    End-stage renal disease on peritoneal dialysis (HCC)    Persistent proteinuria    Bilateral leg edema    Ataxia    Esophagitis    Left atrial dilation    Renovascular hypertension    Anemia    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 ([facility] )    Peritoneal dialysis catheter in place ([facility]    Dyslipidemia    Strabismus    Diarrhea    Current moderate episode of major depressive disorder without prior episode ([facility] )    Environmental and seasonal allergies    Pruritus    Obesity (BMI 30 0-34  9)    Gastroparesis diabeticorum (HCC)    Peripheral polyneuropathy    Elevated TSH    Incidental lung nodule, > 3mm and < 8mm    Vertigo    Impaired mobility and ADLs    Diabetic neuropathy (HCC)    Prov[facility] seizure [facility]    Asterixis    Foot drop, bilateral    Scalp cyst    End-stage renal disease ([facility] )    Peritonitis ([facility] )    Verbalizes suicidal thoughts    Gastroparesis      Past Medical and Surgical History:     Past Medical History:   Diagnosis Date    Acute kidney injury ([facility]     Anxiety     Cerebellar stroke side undetermined 2015 2015,1/2018    Diabetes type 1, controlled ([facility] )     IDDM    Diarrhea     Gastroparesis     History of shingles 2010    History of transfusion 02/2018    Hypertension     Muscle weakness     general unsteadiness    Retinopathy      Past Surgical History:   Procedure Laterality Date    CARDIAC LOOP RECORDER  05/2018    EGD      EYE SURGERY      PERITONEAL CATHETER INSERTION N/A 8/27/2018    Procedure: UNROOF PD CATHETER;  Surgeon: Deanna Tabares DO;  [facility];  Service: General    UT ESOPHAGOGASTRODUODENOSCOPY TRANSORAL DIAGNOSTIC N/A 2019    Procedure: ESOPHAGOGASTRODUODENOSCOPY (EGD); Surgeon: Erick Simmons MD;  [facility];   Service: Gastroenterology    CA LAP INSERTION TUNNELED INTRAPERITONEAL CATHETER N/A 2018    Procedure: LAPAROSCOPIC PD CATHETER PLACEMENT;  Surgeon: Blair Quiroz DO;  [facility];  Service: General    TONSILLECTOMY      UPPER GASTROINTESTINAL ENDOSCOPY        Family History:     Family History   Problem Relation Age of Onset    Breast cancer Mother     Hypertension Mother     Hyperlipidemia Father     Hypertension Father     Leukemia Maternal Grandmother     Hyperlipidemia Maternal Grandfather     Hypertension Maternal Grandfather     Hyperlipidemia Paternal Grandmother     Hypertension Paternal Grandmother     Heart disease Paternal Grandfather         cardiac disorder    Diabetes Paternal Grandfather       Social History:     E-Cigarette/Vaping    E-Cigarette Use Never User      E-Cigarette/Vaping Substances    Nicotine No     THC No     CBD No     Flavoring No     Other No     Unknown No      Social History     Socioeconomic History    Marital status: Single     Spouse name: None    Number of children: None    Years of education: None    Highest education level: None   Occupational History    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   Social Needs    Financial resource strain: None    Food insecurity     Worry: None     Inability: None    Transportation needs     Medical: No     Non-medical: No   Tobacco Use    Smoking status: Former Smoker     Packs/day: 0 50     Years: 12 00     Pack years: 6 00     Types: Cigarettes     Last attempt to quit: 2018     Years since quittin 6    Smokeless tobacco: Former User    Tobacco comment: quit 2018   Substance and Sexual Activity    Alcohol use: Not Currently     Comment: rarely    Drug use: Yes     Frequency: 5 0 times per week     Types: Marijuana     Comment: medical    Sexual activity: None   Lifestyle    Physical activity     Days per week: None     Minutes per session: None    Stress: None   Relationships    Social connections     Talks on phone: None     Gets together: None     Attends Christian service: None     Active member of club or organization: None     Attends meetings of clubs or organizations: None     Relationship status: None    Intimate partner violence     Fear of current or ex partner: None     Emotionally abused: None     Physically abused: None     Forced sexual activity: None   Other Topics Concern    None   Social History Narrative    Caffeine use    single      Medications and Allergies:     Current Outpatient Medications   Medication Sig Dispense Refill    aspirin 81 mg chewable tablet Chew 81 mg daily      atorvastatin (LIPITOR) 40 mg tablet Take 1 tablet (40 mg total) by mouth every evening 90 tablet 1    AVASTIN 100 MG/4ML see administration instructions Given by opthalmology      b complex vitamins capsule Take 1 capsule by mouth daily before lunch       B-D ULTRAFINE III SHORT PEN 31G X 8 MM MISC USE 1 PEN NEEDLE 8 TIMES DAILY 100 each 47    bisacodyl (DULCOLAX) 5 mg EC tablet Take 2 tablets (10 mg total) by mouth once for 1 dose 2 tablet 0    calcium acetate (PHOSLO) 667 mg capsule Take 1 capsule (667 mg total) by mouth 3 (three) times a day with meals  0    Cholecalciferol (VITAMIN D) 2000 units CAPS Take 1 capsule by mouth daily      cinacalcet (SENSIPAR) 60 MG tablet Take 1 tablet (60 mg total) by mouth daily (Patient taking differently: Take 60 mg by mouth every other day )      cloNIDine (CATAPRES) 0 1 mg tablet Take 0 1 mg by mouth as needed May add another 0 1 as needed      divalproex sodium (DEPAKOTE ER) 250 mg 24 hr tablet Take 1 tablet (250 mg total) by mouth daily For 14 days then stop 14 tablet 0    doxazosin (CARDURA) 2 mg tablet Take 1 tablet (2 mg total) by mouth daily at bedtime (Patient taking differently: Take 2 mg by mouth daily ) 30 tablet 0    escitalopram (LEXAPRO) 10 mg tablet Take 1 tablet (10 mg total) by mouth daily 90 tablet 2    famotidine (PEPCID) 40 MG tablet Take 1 tablet (40 mg total) by mouth 2 (two) times a day 60 tablet 2    gabapentin (NEURONTIN) 100 mg capsule Take 100 mg by mouth daily at bedtime  1    gentamicin (GARAMYCIN) 0 1 % cream APPLY TO EXIT SITE ONCE DAILY 30 g 2    GLUCAGON EMERGENCY 1 MG injection INJECT 1MG SUBCUTANEOUSLY AS NEEDED (AS DIRECTED)   MAY REPEAT DOSE EVERY 20 MINUTES AS NEEDED  3    hydrALAZINE (APRESOLINE) 100 MG tablet Take 0 5 tablets (50 mg total) by mouth 3 (three) times a day      hydrALAZINE (APRESOLINE) 50 mg tablet TAKE 1 TABLET BY MOUTH IN THE MORNING AND 2 TABLETS WITH LUNCH AND 1 TABLET AT BEDTIME      insulin glargine (LANTUS) 100 units/mL subcutaneous injection Inject 5 Units under the skin daily after breakfast      insulin lispro (Admelog SoloStar) 100 units/mL injection pen Take 4 units with breakfast, 2 units with lunch, and 4 units with dinner 3 mL 0    insulin NPH (HumuLIN N,NovoLIN N) 100 Units/mL subcutaneous injection Inject 4 Units under the skin daily at bedtime      labetalol (NORMODYNE) 300 mg tablet Take 1 tablet (300 mg total) by mouth every 12 (twelve) hours (Patient taking differently: Take 300 mg by mouth see administration instructions Take 1 tablet at 6AM and second tablet at 9PM) 60 tablet 0    lisinopril (ZESTRIL) 40 mg tablet Take 40 mg by mouth daily      metoclopramide (REGLAN) 5 mg tablet Take 1 tablet (5 mg total) by mouth 2 (two) times a day as needed (May take up two 2 doses as needed for nausea and vomiting if Zofran has not worked) 20 tablet 1    metolazone (ZAROXOLYN) 10 mg tablet Take 10 mg by mouth daily Take 1/2 an hour before lunch      NIFEdipine ER (ADALAT CC) 60 MG 24 hr tablet Take 1 tablet (60 mg total) by mouth 2 (two) times a day 180 tablet 0    ondansetron (ZOFRAN) 4 mg tablet Take 1 tablet (4 mg total) by mouth every 8 (eight) hours as needed for nausea or vomiting (Patient taking differently: Take 4 mg by mouth every 8 (eight) hours as needed for nausea or vomiting ) 30 tablet 0    oxyCODONE-acetaminophen (PERCOCET) 5-325 mg per tablet oxycodone-acetaminophen 5 mg-325 mg tablet   TAKE 1 TABLET BY MOUTH EVERY 6 HOURS AS NEEDED      polyethylene glycol (GOLYTELY) 4000 mL solution Take 4,000 mL by mouth once for 1 dose 4000 mL 0    Probiotic Product (PROBIOTIC DAILY) CAPS Take 1 capsule by mouth daily at bedtime       Sucroferric Oxyhydroxide (VELPHORO PO) 500 mg 3 (three) times a day with meals       torsemide (DEMADEX) 100 mg tablet Take 100 mg by mouth 2 (two) times a day   11    Velphoro 500 MG CHEW CHEW AND SWALLOW 1 TABLET BY MOUTH WITH MEALS (UP TO THREE TIMES DAILY)  No current facility-administered medications for this visit        Allergies   Allergen Reactions    Sulfa Antibiotics Rash      Immunizations:     Immunization History   Administered Date(s) Administered     Influenza (IM) Preservative Free 09/12/2017, 09/24/2018    INFLUENZA 01/22/2018, 10/07/2019    Influenza Quadrivalent 3 years and older 10/31/2019    Influenza Quadrivalent Preservative Free 3 years and older IM 02/05/2018    Influenza TIV (IM) 11/10/2015    Influenza, injectable, quadrivalent, preservative free 0 5 mL 09/24/2018    Influenza, recombinant, quadrivalent,injectable, preservative free 09/30/2020    Pneumococcal Polysaccharide PPV23 08/31/2018, 03/08/2019    Tdap 03/08/2019    Tuberculin Skin Test-PPD Intradermal 08/29/2018, 09/03/2019      Health Maintenance:         Topic Date Due    Hepatitis C Screening  Completed         Topic Date Due    Pneumococcal Vaccine: Pediatrics (0 to 5 Years) and At-Risk Patients (6 to 59 Years) (3 of 3 - PCV13) 03/08/2020    Influenza Vaccine  07/01/2020      Medicare Health Risk Assessment:     /86   Pulse 91   Temp 98 4 °F (36 9 °C)   Resp 18   Ht 5' 10" (1 778 m) Wt 104 kg (230 lb 3 2 oz)   SpO2 98%   BMI 33 03 kg/m²      Divine Hair is here for his Welcome to Medicare visit  Last Medicare Wellness visit information reviewed, patient interviewed and updates made to the record today  Health Risk Assessment:   Patient rates overall health as poor  Patient feels that their physical health rating is slightly worse  Eyesight was rated as same  Hearing was rated as same  Patient feels that their emotional and mental health rating is much worse  Pain experienced in the last 7 days has been some  Patient's pain rating has been 5/10  Patient states that he has experienced weight loss or gain in last 6 months  Depression Screening:   PHQ-2 Score: 2  PHQ-9 Score: 6      Fall Risk Screening: In the past year, patient has experienced: no history of falling in past year      Home Safety:  Patient does not have trouble with stairs inside or outside of their home  Patient has working smoke alarms and has working carbon monoxide detector  Home safety hazards include: none  Uses cane outside of his home    Nutrition:   Current diet is Diabetic  Medications:   Patient is currently taking over-the-counter supplements  OTC medications include: see medication list  Patient is able to manage medications  Activities of Daily Living (ADLs)/Instrumental Activities of Daily Living (IADLs):   Walk and transfer into and out of bed and chair?: Yes  Dress and groom yourself?: Yes    Bathe or shower yourself?: Yes    Feed yourself?  Yes  Do your laundry/housekeeping?: Yes  Manage your money, pay your bills and track your expenses?: No  Make your own meals?: Yes    Do your own shopping?: No    ADL comments: Parents handle his medications    Durable Medical Equipment Suppliers  patient does not know    Previous Hospitalizations:   Any hospitalizations or ED visits within the last 12 months?: Yes    How many hospitalizations have you had in the last year?: 3-4    Advance Care Planning: Living will: No    Durable POA for healthcare: No    Advanced directive: No    Advanced directive counseling given: Yes    Five wishes given: No      Cognitive Screening:   Provider or family/friend/caregiver concerned regarding cognition?: No    PREVENTIVE SCREENINGS      Cardiovascular Screening:    General: Screening Current and Risks and Benefits Discussed      Diabetes Screening:     General: Screening Not Indicated, History Diabetes, Risks and Benefits Discussed and Screening Current      Colorectal Cancer Screening:     General: Screening Not Indicated      Prostate Cancer Screening:    General: Screening Not Indicated      Osteoporosis Screening:    General: Risks and Benefits Discussed and Patient Declines      Abdominal Aortic Aneurysm (AAA) Screening:    Risk factors include: tobacco use        Lung Cancer Screening:     General: Screening Not Indicated      Hepatitis C Screening:    General: Screening Current    Other Counseling Topics:   Car/seat belt/driving safety, sunscreen and calcium and vitamin D intake and regular weightbearing exercise  Immunizations dicussed    Review of Systems   Constitutional: Positive for activity change, fatigue and unexpected weight change  Negative for appetite change  HENT: Negative for congestion, postnasal drip, rhinorrhea, sneezing and sore throat  Respiratory: Negative for cough, shortness of breath, wheezing and stridor  Cardiovascular: Positive for leg swelling  Negative for chest pain and palpitations  Gastrointestinal: Positive for diarrhea and nausea  Negative for abdominal pain, constipation and vomiting  Genitourinary:        Oligouric   Musculoskeletal: Positive for gait problem and neck pain (neck cramping)  Negative for back pain and neck stiffness  Neurological: Positive for dizziness, numbness and headaches  Negative for weakness  Psychiatric/Behavioral: Positive for decreased concentration, dysphoric mood and sleep disturbance   The patient is not nervous/anxious  Physical Exam  Vitals signs reviewed  Constitutional:       General: He is not in acute distress  Appearance: Normal appearance  He is obese  He is not diaphoretic  HENT:      Head: Normocephalic  Right Ear: Tympanic membrane, ear canal and external ear normal  There is no impacted cerumen  Left Ear: Tympanic membrane, ear canal and external ear normal  There is no impacted cerumen  Nose: Rhinorrhea present  No congestion  Mouth/Throat:      Mouth: Mucous membranes are moist       Pharynx: Oropharynx is clear  No oropharyngeal exudate or posterior oropharyngeal erythema  Eyes:      Extraocular Movements: Extraocular movements intact  Conjunctiva/sclera: Conjunctivae normal       Pupils: Pupils are equal, round, and reactive to light  Comments: Wears glasses   Neck:      Musculoskeletal: Neck supple  No muscular tenderness  Thyroid: No thyroid mass, thyromegaly or thyroid tenderness  Cardiovascular:      Rate and Rhythm: Normal rate and regular rhythm  Pulses: Decreased pulses  Heart sounds: Normal heart sounds  Pulmonary:      Effort: Pulmonary effort is normal  No respiratory distress  Breath sounds: Normal breath sounds  Abdominal:      General: Bowel sounds are normal  There is no distension  Palpations: Abdomen is soft  Tenderness: There is no abdominal tenderness  Comments: PD catheter intact   Musculoskeletal:      Right lower leg: No edema  Left lower leg: No edema  Lymphadenopathy:      Cervical: No cervical adenopathy  Skin:     General: Skin is dry  Neurological:      Mental Status: He is alert and oriented to person, place, and time  Psychiatric:         Attention and Perception: Attention normal          Mood and Affect: Mood is depressed  Mood is not anxious  Speech: Speech normal          Behavior: Behavior is cooperative         EKG: SR at 83bpm, normal axis, normal QRS   Normal ekg    Philipp Nobles DO

## 2020-09-30 NOTE — ASSESSMENT & PLAN NOTE
Appear to most likely functional or due to diabetic autonomic neuropathy  Rule out infectious etiology or IBD    -check stool studies including pancreatic elastase    -Schedule for colonoscopy  -Patient was given instructions about the colonoscopy prep     -Patient was explained about  the risks and benefits of the procedure  Risks including but not limited to bleeding, infection, perforation were explained in detail  Also explained about less than 100% sensitivity with the exam and other alternatives

## 2020-09-30 NOTE — ASSESSMENT & PLAN NOTE
History of diabetic gastroparesis status post Botox injection to the pylorus  Patient reports doing well as long as he watches his diet     -advised him about small frequent low-fat, low residue diet    -may take Zofran as needed    -discussed about other medications Reglan and domperidone and the probable side effects

## 2020-10-08 ENCOUNTER — REMOTE DEVICE CLINIC VISIT (OUTPATIENT)
Dept: CARDIOLOGY CLINIC | Facility: CLINIC | Age: 35
End: 2020-10-08
Payer: MEDICARE

## 2020-10-08 DIAGNOSIS — Z95.818 PRESENCE OF CARDIAC DEVICE: Primary | ICD-10-CM

## 2020-10-08 PROCEDURE — G2066 INTER DEVC REMOTE 30D: HCPCS | Performed by: INTERNAL MEDICINE

## 2020-10-08 PROCEDURE — 93298 REM INTERROG DEV EVAL SCRMS: CPT | Performed by: INTERNAL MEDICINE

## 2020-10-18 ENCOUNTER — APPOINTMENT (EMERGENCY)
Dept: CT IMAGING | Facility: HOSPITAL | Age: 35
DRG: 371 | End: 2020-10-18
Payer: MEDICARE

## 2020-10-18 ENCOUNTER — HOSPITAL ENCOUNTER (INPATIENT)
Facility: HOSPITAL | Age: 35
LOS: 7 days | Discharge: HOME | DRG: 371 | End: 2020-10-26
Attending: EMERGENCY MEDICINE | Admitting: INTERNAL MEDICINE
Payer: MEDICARE

## 2020-10-18 DIAGNOSIS — N18.6 END-STAGE RENAL DISEASE ON PERITONEAL DIALYSIS (HCC): ICD-10-CM

## 2020-10-18 DIAGNOSIS — K31.9 TYPE 1 DIABETES MELLITUS WITH DIABETIC GASTROPATHY (HCC): ICD-10-CM

## 2020-10-18 DIAGNOSIS — E87.5 HYPERKALEMIA: ICD-10-CM

## 2020-10-18 DIAGNOSIS — R10.32 LLQ PAIN: Primary | ICD-10-CM

## 2020-10-18 DIAGNOSIS — E10.43 TYPE 1 DIABETES MELLITUS WITH DIABETIC GASTROPATHY (HCC): ICD-10-CM

## 2020-10-18 DIAGNOSIS — K52.9 CHRONIC DIARRHEA: ICD-10-CM

## 2020-10-18 DIAGNOSIS — I16.0 HYPERTENSIVE URGENCY: ICD-10-CM

## 2020-10-18 DIAGNOSIS — R10.12 LUQ ABDOMINAL PAIN: ICD-10-CM

## 2020-10-18 DIAGNOSIS — Z99.2 END-STAGE RENAL DISEASE ON PERITONEAL DIALYSIS (HCC): ICD-10-CM

## 2020-10-18 DIAGNOSIS — R19.7 DIARRHEA, UNSPECIFIED TYPE: ICD-10-CM

## 2020-10-18 LAB
ALBUMIN SERPL BCP-MCNC: 2.1 G/DL (ref 3.5–5)
ALP SERPL-CCNC: 79 U/L (ref 46–116)
ALT SERPL W P-5'-P-CCNC: 23 U/L (ref 12–78)
ANION GAP SERPL CALCULATED.3IONS-SCNC: 9 MMOL/L (ref 4–13)
AST SERPL W P-5'-P-CCNC: 19 U/L (ref 5–45)
BASOPHILS # BLD AUTO: 0.1 THOUSANDS/ΜL (ref 0–0.1)
BASOPHILS NFR BLD AUTO: 1 % (ref 0–1)
BILIRUB SERPL-MCNC: 0.29 MG/DL (ref 0.2–1)
BUN SERPL-MCNC: 49 MG/DL (ref 5–25)
CALCIUM ALBUM COR SERPL-MCNC: 9.9 MG/DL (ref 8.3–10.1)
CALCIUM SERPL-MCNC: 8.4 MG/DL (ref 8.3–10.1)
CHLORIDE SERPL-SCNC: 99 MMOL/L (ref 100–108)
CO2 SERPL-SCNC: 29 MMOL/L (ref 21–32)
CREAT SERPL-MCNC: 13.27 MG/DL (ref 0.6–1.3)
EOSINOPHIL # BLD AUTO: 2.03 THOUSAND/ΜL (ref 0–0.61)
EOSINOPHIL NFR BLD AUTO: 23 % (ref 0–6)
ERYTHROCYTE [DISTWIDTH] IN BLOOD BY AUTOMATED COUNT: 14.9 % (ref 11.6–15.1)
GFR SERPL CREATININE-BSD FRML MDRD: 4 ML/MIN/1.73SQ M
GLUCOSE SERPL-MCNC: 200 MG/DL (ref 65–140)
HCT VFR BLD AUTO: 28.9 % (ref 36.5–49.3)
HGB BLD-MCNC: 9.5 G/DL (ref 12–17)
IMM GRANULOCYTES # BLD AUTO: 0.03 THOUSAND/UL (ref 0–0.2)
IMM GRANULOCYTES NFR BLD AUTO: 0 % (ref 0–2)
LACTATE SERPL-SCNC: 0.5 MMOL/L (ref 0.5–2)
LIPASE SERPL-CCNC: 68 U/L (ref 73–393)
LYMPHOCYTES # BLD AUTO: 1.15 THOUSANDS/ΜL (ref 0.6–4.47)
LYMPHOCYTES NFR BLD AUTO: 13 % (ref 14–44)
MAGNESIUM SERPL-MCNC: 2.1 MG/DL (ref 1.6–2.6)
MCH RBC QN AUTO: 31.7 PG (ref 26.8–34.3)
MCHC RBC AUTO-ENTMCNC: 32.9 G/DL (ref 31.4–37.4)
MCV RBC AUTO: 96 FL (ref 82–98)
MONOCYTES # BLD AUTO: 0.56 THOUSAND/ΜL (ref 0.17–1.22)
MONOCYTES NFR BLD AUTO: 6 % (ref 4–12)
NEUTROPHILS # BLD AUTO: 5.08 THOUSANDS/ΜL (ref 1.85–7.62)
NEUTS SEG NFR BLD AUTO: 57 % (ref 43–75)
NRBC BLD AUTO-RTO: 0 /100 WBCS
PHOSPHATE SERPL-MCNC: 7.7 MG/DL (ref 2.7–4.5)
PLATELET # BLD AUTO: 337 THOUSANDS/UL (ref 149–390)
PMV BLD AUTO: 9.7 FL (ref 8.9–12.7)
POTASSIUM SERPL-SCNC: 6.1 MMOL/L (ref 3.5–5.3)
PROT SERPL-MCNC: 5.2 G/DL (ref 6.4–8.2)
RBC # BLD AUTO: 3 MILLION/UL (ref 3.88–5.62)
SODIUM SERPL-SCNC: 137 MMOL/L (ref 136–145)
WBC # BLD AUTO: 8.95 THOUSAND/UL (ref 4.31–10.16)

## 2020-10-18 PROCEDURE — 36415 COLL VENOUS BLD VENIPUNCTURE: CPT | Performed by: EMERGENCY MEDICINE

## 2020-10-18 PROCEDURE — 83735 ASSAY OF MAGNESIUM: CPT | Performed by: EMERGENCY MEDICINE

## 2020-10-18 PROCEDURE — 83605 ASSAY OF LACTIC ACID: CPT | Performed by: EMERGENCY MEDICINE

## 2020-10-18 PROCEDURE — 84100 ASSAY OF PHOSPHORUS: CPT | Performed by: EMERGENCY MEDICINE

## 2020-10-18 PROCEDURE — 83690 ASSAY OF LIPASE: CPT | Performed by: EMERGENCY MEDICINE

## 2020-10-18 PROCEDURE — 96365 THER/PROPH/DIAG IV INF INIT: CPT

## 2020-10-18 PROCEDURE — 80053 COMPREHEN METABOLIC PANEL: CPT | Performed by: EMERGENCY MEDICINE

## 2020-10-18 PROCEDURE — G1004 CDSM NDSC: HCPCS

## 2020-10-18 PROCEDURE — 80048 BASIC METABOLIC PNL TOTAL CA: CPT | Performed by: INTERNAL MEDICINE

## 2020-10-18 PROCEDURE — 74176 CT ABD & PELVIS W/O CONTRAST: CPT

## 2020-10-18 PROCEDURE — 96375 TX/PRO/DX INJ NEW DRUG ADDON: CPT

## 2020-10-18 PROCEDURE — 93005 ELECTROCARDIOGRAM TRACING: CPT

## 2020-10-18 PROCEDURE — 85025 COMPLETE CBC W/AUTO DIFF WBC: CPT | Performed by: EMERGENCY MEDICINE

## 2020-10-18 PROCEDURE — 99285 EMERGENCY DEPT VISIT HI MDM: CPT

## 2020-10-18 PROCEDURE — 99284 EMERGENCY DEPT VISIT MOD MDM: CPT | Performed by: EMERGENCY MEDICINE

## 2020-10-18 RX ORDER — ACETAMINOPHEN 325 MG/1
975 TABLET ORAL ONCE
Status: COMPLETED | OUTPATIENT
Start: 2020-10-18 | End: 2020-10-18

## 2020-10-18 RX ORDER — DIVALPROEX SODIUM 250 MG/1
250 TABLET, EXTENDED RELEASE ORAL DAILY
Status: DISCONTINUED | OUTPATIENT
Start: 2020-10-19 | End: 2020-10-21

## 2020-10-18 RX ORDER — ATORVASTATIN CALCIUM 40 MG/1
40 TABLET, FILM COATED ORAL EVERY EVENING
Status: DISCONTINUED | OUTPATIENT
Start: 2020-10-19 | End: 2020-10-26 | Stop reason: HOSPADM

## 2020-10-18 RX ORDER — HEPARIN SODIUM 5000 [USP'U]/ML
5000 INJECTION, SOLUTION INTRAVENOUS; SUBCUTANEOUS EVERY 8 HOURS SCHEDULED
Status: DISCONTINUED | OUTPATIENT
Start: 2020-10-19 | End: 2020-10-26 | Stop reason: HOSPADM

## 2020-10-18 RX ORDER — FUROSEMIDE 10 MG/ML
80 INJECTION INTRAMUSCULAR; INTRAVENOUS ONCE
Status: COMPLETED | OUTPATIENT
Start: 2020-10-18 | End: 2020-10-18

## 2020-10-18 RX ORDER — ESCITALOPRAM OXALATE 10 MG/1
10 TABLET ORAL DAILY
Status: DISCONTINUED | OUTPATIENT
Start: 2020-10-19 | End: 2020-10-26 | Stop reason: HOSPADM

## 2020-10-18 RX ORDER — DICYCLOMINE HCL 20 MG
20 TABLET ORAL ONCE
Status: COMPLETED | OUTPATIENT
Start: 2020-10-18 | End: 2020-10-18

## 2020-10-18 RX ORDER — LISINOPRIL 10 MG/1
40 TABLET ORAL DAILY
Status: CANCELLED | OUTPATIENT
Start: 2020-10-19

## 2020-10-18 RX ORDER — CALCIUM ACETATE 667 MG/1
667 CAPSULE ORAL
Status: DISCONTINUED | OUTPATIENT
Start: 2020-10-19 | End: 2020-10-19

## 2020-10-18 RX ORDER — LABETALOL 100 MG/1
300 TABLET, FILM COATED ORAL EVERY 12 HOURS SCHEDULED
Status: DISCONTINUED | OUTPATIENT
Start: 2020-10-19 | End: 2020-10-26 | Stop reason: HOSPADM

## 2020-10-18 RX ORDER — HYDRALAZINE HYDROCHLORIDE 25 MG/1
100 TABLET, FILM COATED ORAL
Status: DISCONTINUED | OUTPATIENT
Start: 2020-10-19 | End: 2020-10-19

## 2020-10-18 RX ORDER — FAMOTIDINE 20 MG/1
40 TABLET, FILM COATED ORAL 2 TIMES DAILY
Status: DISCONTINUED | OUTPATIENT
Start: 2020-10-19 | End: 2020-10-26 | Stop reason: HOSPADM

## 2020-10-18 RX ORDER — DOXAZOSIN MESYLATE 1 MG/1
2 TABLET ORAL DAILY
Status: DISCONTINUED | OUTPATIENT
Start: 2020-10-19 | End: 2020-10-26 | Stop reason: HOSPADM

## 2020-10-18 RX ORDER — GABAPENTIN 100 MG/1
100 CAPSULE ORAL
Status: DISCONTINUED | OUTPATIENT
Start: 2020-10-19 | End: 2020-10-26 | Stop reason: HOSPADM

## 2020-10-18 RX ORDER — OXYCODONE HYDROCHLORIDE AND ACETAMINOPHEN 5; 325 MG/1; MG/1
1 TABLET ORAL EVERY 6 HOURS PRN
Status: DISCONTINUED | OUTPATIENT
Start: 2020-10-18 | End: 2020-10-26 | Stop reason: HOSPADM

## 2020-10-18 RX ORDER — CALCIUM GLUCONATE 20 MG/ML
1 INJECTION, SOLUTION INTRAVENOUS ONCE
Status: COMPLETED | OUTPATIENT
Start: 2020-10-18 | End: 2020-10-18

## 2020-10-18 RX ORDER — GENTAMICIN SULFATE 1 MG/G
CREAM TOPICAL DAILY
Status: DISCONTINUED | OUTPATIENT
Start: 2020-10-19 | End: 2020-10-23

## 2020-10-18 RX ORDER — METOLAZONE 5 MG/1
10 TABLET ORAL DAILY
Status: DISCONTINUED | OUTPATIENT
Start: 2020-10-19 | End: 2020-10-26 | Stop reason: HOSPADM

## 2020-10-18 RX ORDER — TORSEMIDE 100 MG/1
100 TABLET ORAL 2 TIMES DAILY
Status: DISCONTINUED | OUTPATIENT
Start: 2020-10-19 | End: 2020-10-26 | Stop reason: HOSPADM

## 2020-10-18 RX ORDER — HYDRALAZINE HYDROCHLORIDE 25 MG/1
50 TABLET, FILM COATED ORAL 3 TIMES DAILY
Status: DISCONTINUED | OUTPATIENT
Start: 2020-10-19 | End: 2020-10-22

## 2020-10-18 RX ORDER — NIFEDIPINE 60 MG/1
60 TABLET, EXTENDED RELEASE ORAL 2 TIMES DAILY
Status: DISCONTINUED | OUTPATIENT
Start: 2020-10-19 | End: 2020-10-19

## 2020-10-18 RX ORDER — CINACALCET 30 MG/1
60 TABLET, FILM COATED ORAL EVERY OTHER DAY
Status: DISCONTINUED | OUTPATIENT
Start: 2020-10-19 | End: 2020-10-26 | Stop reason: HOSPADM

## 2020-10-18 RX ORDER — METOCLOPRAMIDE 5 MG/1
5 TABLET ORAL 2 TIMES DAILY PRN
Status: DISCONTINUED | OUTPATIENT
Start: 2020-10-18 | End: 2020-10-19

## 2020-10-18 RX ORDER — INSULIN GLARGINE 100 [IU]/ML
5 INJECTION, SOLUTION SUBCUTANEOUS
Status: DISCONTINUED | OUTPATIENT
Start: 2020-10-19 | End: 2020-10-21

## 2020-10-18 RX ORDER — ASPIRIN 81 MG/1
81 TABLET, CHEWABLE ORAL DAILY
Status: DISCONTINUED | OUTPATIENT
Start: 2020-10-19 | End: 2020-10-26 | Stop reason: HOSPADM

## 2020-10-18 RX ADMIN — INSULIN HUMAN 5 UNITS: 100 INJECTION, SOLUTION PARENTERAL at 20:50

## 2020-10-18 RX ADMIN — DICYCLOMINE HYDROCHLORIDE 20 MG: 20 TABLET ORAL at 21:58

## 2020-10-18 RX ADMIN — FUROSEMIDE 80 MG: 10 INJECTION, SOLUTION INTRAMUSCULAR; INTRAVENOUS at 20:50

## 2020-10-18 RX ADMIN — ACETAMINOPHEN 975 MG: 325 TABLET, FILM COATED ORAL at 19:54

## 2020-10-18 RX ADMIN — CALCIUM GLUCONATE 1 G: 20 INJECTION, SOLUTION INTRAVENOUS at 20:51

## 2020-10-19 PROBLEM — K65.2 SPONTANEOUS BACTERIAL PERITONITIS (HCC): Status: ACTIVE | Noted: 2020-10-19

## 2020-10-19 PROBLEM — R10.12 LUQ ABDOMINAL PAIN: Status: ACTIVE | Noted: 2020-10-19

## 2020-10-19 LAB
ANION GAP SERPL CALCULATED.3IONS-SCNC: 13 MMOL/L (ref 4–13)
ANION GAP SERPL CALCULATED.3IONS-SCNC: 9 MMOL/L (ref 4–13)
APPEARANCE FLD: ABNORMAL
APPEARANCE FLD: CLEAR
BUN SERPL-MCNC: 50 MG/DL (ref 5–25)
BUN SERPL-MCNC: 51 MG/DL (ref 5–25)
CALCIUM SERPL-MCNC: 8.5 MG/DL (ref 8.3–10.1)
CALCIUM SERPL-MCNC: 8.7 MG/DL (ref 8.3–10.1)
CHLORIDE SERPL-SCNC: 100 MMOL/L (ref 100–108)
CHLORIDE SERPL-SCNC: 99 MMOL/L (ref 100–108)
CO2 SERPL-SCNC: 25 MMOL/L (ref 21–32)
CO2 SERPL-SCNC: 28 MMOL/L (ref 21–32)
COLOR FLD: ABNORMAL
COLOR FLD: NORMAL
CREAT SERPL-MCNC: 13.33 MG/DL (ref 0.6–1.3)
CREAT SERPL-MCNC: 13.36 MG/DL (ref 0.6–1.3)
EOSINOPHIL NFR FLD MANUAL: 2 %
EOSINOPHIL NFR FLD MANUAL: 7 %
ERYTHROCYTE [DISTWIDTH] IN BLOOD BY AUTOMATED COUNT: 15.1 % (ref 11.6–15.1)
GFR SERPL CREATININE-BSD FRML MDRD: 4 ML/MIN/1.73SQ M
GFR SERPL CREATININE-BSD FRML MDRD: 4 ML/MIN/1.73SQ M
GLUCOSE SERPL-MCNC: 107 MG/DL (ref 65–140)
GLUCOSE SERPL-MCNC: 121 MG/DL (ref 65–140)
GLUCOSE SERPL-MCNC: 172 MG/DL (ref 65–140)
GLUCOSE SERPL-MCNC: 199 MG/DL (ref 65–140)
GLUCOSE SERPL-MCNC: 268 MG/DL (ref 65–140)
GLUCOSE SERPL-MCNC: 90 MG/DL (ref 65–140)
HCT VFR BLD AUTO: 30.1 % (ref 36.5–49.3)
HGB BLD-MCNC: 9.7 G/DL (ref 12–17)
LYMPHOCYTES NFR BLD AUTO: 15 %
LYMPHOCYTES NFR BLD AUTO: 24 %
MCH RBC QN AUTO: 31 PG (ref 26.8–34.3)
MCHC RBC AUTO-ENTMCNC: 32.2 G/DL (ref 31.4–37.4)
MCV RBC AUTO: 96 FL (ref 82–98)
MONOCYTES NFR BLD AUTO: 22 %
MONOCYTES NFR BLD AUTO: 32 %
NEUTS SEG NFR BLD AUTO: 47 %
NEUTS SEG NFR BLD AUTO: 51 %
PHOSPHATE SERPL-MCNC: 7.4 MG/DL (ref 2.7–4.5)
PLATELET # BLD AUTO: 302 THOUSANDS/UL (ref 149–390)
PLATELET # BLD AUTO: 311 THOUSANDS/UL (ref 149–390)
PMV BLD AUTO: 9.4 FL (ref 8.9–12.7)
PMV BLD AUTO: 9.4 FL (ref 8.9–12.7)
POTASSIUM SERPL-SCNC: 4.9 MMOL/L (ref 3.5–5.3)
POTASSIUM SERPL-SCNC: 5.7 MMOL/L (ref 3.5–5.3)
RBC # BLD AUTO: 3.13 MILLION/UL (ref 3.88–5.62)
SITE: ABNORMAL
SITE: NORMAL
SODIUM SERPL-SCNC: 136 MMOL/L (ref 136–145)
SODIUM SERPL-SCNC: 138 MMOL/L (ref 136–145)
TOTAL CELLS COUNTED SPEC: 100
TOTAL CELLS COUNTED SPEC: 100
WBC # BLD AUTO: 8.75 THOUSAND/UL (ref 4.31–10.16)
WBC # FLD MANUAL: 174 /UL
WBC # FLD MANUAL: 3790 /UL

## 2020-10-19 PROCEDURE — 36415 COLL VENOUS BLD VENIPUNCTURE: CPT | Performed by: INTERNAL MEDICINE

## 2020-10-19 PROCEDURE — 87205 SMEAR GRAM STAIN: CPT | Performed by: INTERNAL MEDICINE

## 2020-10-19 PROCEDURE — 84100 ASSAY OF PHOSPHORUS: CPT | Performed by: INTERNAL MEDICINE

## 2020-10-19 PROCEDURE — 85049 AUTOMATED PLATELET COUNT: CPT | Performed by: INTERNAL MEDICINE

## 2020-10-19 PROCEDURE — 80048 BASIC METABOLIC PNL TOTAL CA: CPT | Performed by: INTERNAL MEDICINE

## 2020-10-19 PROCEDURE — 85027 COMPLETE CBC AUTOMATED: CPT | Performed by: INTERNAL MEDICINE

## 2020-10-19 PROCEDURE — 87070 CULTURE OTHR SPECIMN AEROBIC: CPT | Performed by: INTERNAL MEDICINE

## 2020-10-19 PROCEDURE — 89051 BODY FLUID CELL COUNT: CPT | Performed by: INTERNAL MEDICINE

## 2020-10-19 PROCEDURE — 99223 1ST HOSP IP/OBS HIGH 75: CPT | Performed by: INTERNAL MEDICINE

## 2020-10-19 PROCEDURE — 82948 REAGENT STRIP/BLOOD GLUCOSE: CPT

## 2020-10-19 PROCEDURE — 99232 SBSQ HOSP IP/OBS MODERATE 35: CPT | Performed by: INTERNAL MEDICINE

## 2020-10-19 PROCEDURE — 90945 DIALYSIS ONE EVALUATION: CPT | Performed by: INTERNAL MEDICINE

## 2020-10-19 PROCEDURE — 3E1M39Z IRRIGATION OF PERITONEAL CAVITY USING DIALYSATE, PERCUTANEOUS APPROACH: ICD-10-PCS | Performed by: INTERNAL MEDICINE

## 2020-10-19 RX ORDER — DICYCLOMINE HCL 20 MG
20 TABLET ORAL 3 TIMES DAILY PRN
Status: DISCONTINUED | OUTPATIENT
Start: 2020-10-19 | End: 2020-10-26 | Stop reason: HOSPADM

## 2020-10-19 RX ORDER — NIFEDIPINE 60 MG/1
60 TABLET, EXTENDED RELEASE ORAL 2 TIMES DAILY
Status: DISCONTINUED | OUTPATIENT
Start: 2020-10-19 | End: 2020-10-26 | Stop reason: HOSPADM

## 2020-10-19 RX ORDER — ONDANSETRON 2 MG/ML
4 INJECTION INTRAMUSCULAR; INTRAVENOUS EVERY 6 HOURS PRN
Status: DISCONTINUED | OUTPATIENT
Start: 2020-10-19 | End: 2020-10-26 | Stop reason: HOSPADM

## 2020-10-19 RX ORDER — CALCIUM ACETATE 667 MG/1
2001 CAPSULE ORAL
Status: DISCONTINUED | OUTPATIENT
Start: 2020-10-19 | End: 2020-10-21

## 2020-10-19 RX ORDER — LOPERAMIDE HYDROCHLORIDE 2 MG/1
2 CAPSULE ORAL 3 TIMES DAILY PRN
Status: DISCONTINUED | OUTPATIENT
Start: 2020-10-19 | End: 2020-10-26 | Stop reason: HOSPADM

## 2020-10-19 RX ORDER — CLONIDINE HYDROCHLORIDE 0.1 MG/1
0.1 TABLET ORAL DAILY
Status: DISCONTINUED | OUTPATIENT
Start: 2020-10-19 | End: 2020-10-22

## 2020-10-19 RX ADMIN — HEPARIN SODIUM 5000 UNITS: 5000 INJECTION INTRAVENOUS; SUBCUTANEOUS at 00:41

## 2020-10-19 RX ADMIN — GABAPENTIN 100 MG: 100 CAPSULE ORAL at 00:28

## 2020-10-19 RX ADMIN — CALCIUM ACETATE 2001 MG: 667 CAPSULE ORAL at 17:22

## 2020-10-19 RX ADMIN — HYDRALAZINE HYDROCHLORIDE 50 MG: 25 TABLET ORAL at 00:28

## 2020-10-19 RX ADMIN — HEPARIN SODIUM 5000 UNITS: 5000 INJECTION INTRAVENOUS; SUBCUTANEOUS at 05:15

## 2020-10-19 RX ADMIN — HEPARIN SODIUM 5000 UNITS: 5000 INJECTION INTRAVENOUS; SUBCUTANEOUS at 13:38

## 2020-10-19 RX ADMIN — METOCLOPRAMIDE 5 MG: 5 TABLET ORAL at 13:38

## 2020-10-19 RX ADMIN — HYDRALAZINE HYDROCHLORIDE 50 MG: 25 TABLET ORAL at 21:19

## 2020-10-19 RX ADMIN — GENTAMICIN SULFATE: 1 CREAM TOPICAL at 10:51

## 2020-10-19 RX ADMIN — DOXAZOSIN 2 MG: 1 TABLET ORAL at 21:18

## 2020-10-19 RX ADMIN — VANCOMYCIN HYDROCHLORIDE 1500 MG: 1 INJECTION, POWDER, LYOPHILIZED, FOR SOLUTION INTRAVENOUS at 13:40

## 2020-10-19 RX ADMIN — FAMOTIDINE 40 MG: 20 TABLET ORAL at 09:14

## 2020-10-19 RX ADMIN — CLONIDINE HYDROCHLORIDE 0.1 MG: 0.1 TABLET ORAL at 00:41

## 2020-10-19 RX ADMIN — FAMOTIDINE 40 MG: 20 TABLET ORAL at 17:23

## 2020-10-19 RX ADMIN — ATORVASTATIN CALCIUM 40 MG: 40 TABLET, FILM COATED ORAL at 00:41

## 2020-10-19 RX ADMIN — CINACALCET 60 MG: 30 TABLET, FILM COATED ORAL at 09:14

## 2020-10-19 RX ADMIN — INSULIN LISPRO 2 UNITS: 100 INJECTION, SOLUTION INTRAVENOUS; SUBCUTANEOUS at 00:31

## 2020-10-19 RX ADMIN — INSULIN GLARGINE 5 UNITS: 100 INJECTION, SOLUTION SUBCUTANEOUS at 10:45

## 2020-10-19 RX ADMIN — INSULIN LISPRO 5 UNITS: 100 INJECTION, SOLUTION INTRAVENOUS; SUBCUTANEOUS at 10:45

## 2020-10-19 RX ADMIN — NIFEDIPINE 60 MG: 60 TABLET, FILM COATED, EXTENDED RELEASE ORAL at 09:16

## 2020-10-19 RX ADMIN — HEPARIN SODIUM 5000 UNITS: 5000 INJECTION INTRAVENOUS; SUBCUTANEOUS at 21:10

## 2020-10-19 RX ADMIN — INSULIN LISPRO 3 UNITS: 100 INJECTION, SOLUTION INTRAVENOUS; SUBCUTANEOUS at 13:44

## 2020-10-19 RX ADMIN — HYDRALAZINE HYDROCHLORIDE 50 MG: 25 TABLET ORAL at 09:14

## 2020-10-19 RX ADMIN — LABETALOL HYDROCHLORIDE 300 MG: 100 TABLET, FILM COATED ORAL at 00:28

## 2020-10-19 RX ADMIN — LABETALOL HYDROCHLORIDE 300 MG: 100 TABLET, FILM COATED ORAL at 21:17

## 2020-10-19 RX ADMIN — INSULIN HUMAN 4 UNITS: 100 INJECTION, SUSPENSION SUBCUTANEOUS at 21:21

## 2020-10-19 RX ADMIN — CEFEPIME HYDROCHLORIDE 1000 MG: 1 INJECTION, POWDER, FOR SOLUTION INTRAMUSCULAR; INTRAVENOUS at 12:44

## 2020-10-19 RX ADMIN — GABAPENTIN 100 MG: 100 CAPSULE ORAL at 21:18

## 2020-10-19 RX ADMIN — ESCITALOPRAM OXALATE 10 MG: 10 TABLET ORAL at 09:14

## 2020-10-19 RX ADMIN — INSULIN HUMAN 4 UNITS: 100 INJECTION, SUSPENSION SUBCUTANEOUS at 00:30

## 2020-10-19 RX ADMIN — BISACODYL 10 MG: 5 TABLET, COATED ORAL at 00:28

## 2020-10-19 RX ADMIN — ONDANSETRON 4 MG: 2 INJECTION INTRAMUSCULAR; INTRAVENOUS at 14:47

## 2020-10-19 RX ADMIN — TORSEMIDE 100 MG: 100 TABLET ORAL at 21:18

## 2020-10-19 RX ADMIN — LABETALOL HYDROCHLORIDE 300 MG: 100 TABLET, FILM COATED ORAL at 09:14

## 2020-10-19 RX ADMIN — ATORVASTATIN CALCIUM 40 MG: 40 TABLET, FILM COATED ORAL at 17:23

## 2020-10-19 RX ADMIN — ASPIRIN 81 MG CHEWABLE TABLET 81 MG: 81 TABLET CHEWABLE at 09:15

## 2020-10-19 RX ADMIN — NIFEDIPINE 60 MG: 60 TABLET, FILM COATED, EXTENDED RELEASE ORAL at 21:20

## 2020-10-19 RX ADMIN — INSULIN LISPRO 5 UNITS: 100 INJECTION, SOLUTION INTRAVENOUS; SUBCUTANEOUS at 18:41

## 2020-10-20 DIAGNOSIS — Z71.89 COMPLEX CARE COORDINATION: Primary | ICD-10-CM

## 2020-10-20 PROBLEM — K52.9 CHRONIC DIARRHEA: Status: ACTIVE | Noted: 2020-10-20

## 2020-10-20 LAB
ANION GAP SERPL CALCULATED.3IONS-SCNC: 7 MMOL/L (ref 4–13)
APPEARANCE FLD: CLEAR
ATRIAL RATE: 81 BPM
BASOPHILS # BLD AUTO: 0.09 THOUSANDS/ΜL (ref 0–0.1)
BASOPHILS NFR BLD AUTO: 1 % (ref 0–1)
BUN SERPL-MCNC: 46 MG/DL (ref 5–25)
C DIFF TOX B TCDB STL QL NAA+PROBE: NEGATIVE
CALCIUM SERPL-MCNC: 8.7 MG/DL (ref 8.3–10.1)
CHLORIDE SERPL-SCNC: 100 MMOL/L (ref 100–108)
CO2 SERPL-SCNC: 29 MMOL/L (ref 21–32)
COLOR FLD: COLORLESS
CREAT SERPL-MCNC: 12.62 MG/DL (ref 0.6–1.3)
EOSINOPHIL # BLD AUTO: 1.43 THOUSAND/ΜL (ref 0–0.61)
EOSINOPHIL NFR BLD AUTO: 23 % (ref 0–6)
EOSINOPHIL NFR FLD MANUAL: 4 %
ERYTHROCYTE [DISTWIDTH] IN BLOOD BY AUTOMATED COUNT: 14.7 % (ref 11.6–15.1)
GFR SERPL CREATININE-BSD FRML MDRD: 4 ML/MIN/1.73SQ M
GLUCOSE SERPL-MCNC: 113 MG/DL (ref 65–140)
GLUCOSE SERPL-MCNC: 230 MG/DL (ref 65–140)
GLUCOSE SERPL-MCNC: 233 MG/DL (ref 65–140)
GLUCOSE SERPL-MCNC: 236 MG/DL (ref 65–140)
GLUCOSE SERPL-MCNC: 242 MG/DL (ref 65–140)
GLUCOSE SERPL-MCNC: 54 MG/DL (ref 65–140)
HCT VFR BLD AUTO: 28.3 % (ref 36.5–49.3)
HGB BLD-MCNC: 9.1 G/DL (ref 12–17)
IMM GRANULOCYTES # BLD AUTO: 0.02 THOUSAND/UL (ref 0–0.2)
IMM GRANULOCYTES NFR BLD AUTO: 0 % (ref 0–2)
LYMPHOCYTES # BLD AUTO: 0.91 THOUSANDS/ΜL (ref 0.6–4.47)
LYMPHOCYTES NFR BLD AUTO: 14 % (ref 14–44)
LYMPHOCYTES NFR BLD AUTO: 19 %
MCH RBC QN AUTO: 30.8 PG (ref 26.8–34.3)
MCHC RBC AUTO-ENTMCNC: 32.2 G/DL (ref 31.4–37.4)
MCV RBC AUTO: 96 FL (ref 82–98)
MONO+MESO NFR FLD MANUAL: 2 %
MONOCYTES # BLD AUTO: 0.43 THOUSAND/ΜL (ref 0.17–1.22)
MONOCYTES NFR BLD AUTO: 34 %
MONOCYTES NFR BLD AUTO: 7 % (ref 4–12)
NEUTROPHILS # BLD AUTO: 3.45 THOUSANDS/ΜL (ref 1.85–7.62)
NEUTS SEG NFR BLD AUTO: 41 %
NEUTS SEG NFR BLD AUTO: 55 % (ref 43–75)
NRBC BLD AUTO-RTO: 0 /100 WBCS
P AXIS: 55 DEGREES
PLATELET # BLD AUTO: 303 THOUSANDS/UL (ref 149–390)
PMV BLD AUTO: 9.4 FL (ref 8.9–12.7)
POTASSIUM SERPL-SCNC: 4.8 MMOL/L (ref 3.5–5.3)
PR INTERVAL: 156 MS
QRS AXIS: 78 DEGREES
QRSD INTERVAL: 68 MS
QT INTERVAL: 382 MS
QTC INTERVAL: 443 MS
RBC # BLD AUTO: 2.95 MILLION/UL (ref 3.88–5.62)
SITE: NORMAL
SODIUM SERPL-SCNC: 136 MMOL/L (ref 136–145)
T WAVE AXIS: 25 DEGREES
TOTAL CELLS COUNTED SPEC: 100
VANCOMYCIN SERPL-MCNC: 22.7 UG/ML
VENTRICULAR RATE: 81 BPM
WBC # BLD AUTO: 6.33 THOUSAND/UL (ref 4.31–10.16)
WBC # FLD MANUAL: 133 /UL

## 2020-10-20 PROCEDURE — 99233 SBSQ HOSP IP/OBS HIGH 50: CPT | Performed by: INTERNAL MEDICINE

## 2020-10-20 PROCEDURE — 87070 CULTURE OTHR SPECIMN AEROBIC: CPT | Performed by: PHYSICIAN ASSISTANT

## 2020-10-20 PROCEDURE — 85025 COMPLETE CBC W/AUTO DIFF WBC: CPT | Performed by: INTERNAL MEDICINE

## 2020-10-20 PROCEDURE — 87209 SMEAR COMPLEX STAIN: CPT | Performed by: INTERNAL MEDICINE

## 2020-10-20 PROCEDURE — 89055 LEUKOCYTE ASSESSMENT FECAL: CPT | Performed by: INTERNAL MEDICINE

## 2020-10-20 PROCEDURE — 87427 SHIGA-LIKE TOXIN AG IA: CPT

## 2020-10-20 PROCEDURE — 82656 EL-1 FECAL QUAL/SEMIQ: CPT | Performed by: INTERNAL MEDICINE

## 2020-10-20 PROCEDURE — 87177 OVA AND PARASITES SMEARS: CPT | Performed by: INTERNAL MEDICINE

## 2020-10-20 PROCEDURE — 93010 ELECTROCARDIOGRAM REPORT: CPT | Performed by: INTERNAL MEDICINE

## 2020-10-20 PROCEDURE — 87205 SMEAR GRAM STAIN: CPT | Performed by: PHYSICIAN ASSISTANT

## 2020-10-20 PROCEDURE — 3E1M39Z IRRIGATION OF PERITONEAL CAVITY USING DIALYSATE, PERCUTANEOUS APPROACH: ICD-10-PCS | Performed by: INTERNAL MEDICINE

## 2020-10-20 PROCEDURE — 99232 SBSQ HOSP IP/OBS MODERATE 35: CPT | Performed by: INTERNAL MEDICINE

## 2020-10-20 PROCEDURE — 80202 ASSAY OF VANCOMYCIN: CPT | Performed by: INTERNAL MEDICINE

## 2020-10-20 PROCEDURE — 82948 REAGENT STRIP/BLOOD GLUCOSE: CPT

## 2020-10-20 PROCEDURE — 87046 STOOL CULTR AEROBIC BACT EA: CPT

## 2020-10-20 PROCEDURE — 87493 C DIFF AMPLIFIED PROBE: CPT | Performed by: INTERNAL MEDICINE

## 2020-10-20 PROCEDURE — 80048 BASIC METABOLIC PNL TOTAL CA: CPT | Performed by: PHYSICIAN ASSISTANT

## 2020-10-20 PROCEDURE — 87045 FECES CULTURE AEROBIC BACT: CPT | Performed by: INTERNAL MEDICINE

## 2020-10-20 PROCEDURE — 89051 BODY FLUID CELL COUNT: CPT | Performed by: PHYSICIAN ASSISTANT

## 2020-10-20 RX ORDER — LISINOPRIL 20 MG/1
20 TABLET ORAL DAILY
Status: DISCONTINUED | OUTPATIENT
Start: 2020-10-20 | End: 2020-10-26 | Stop reason: HOSPADM

## 2020-10-20 RX ADMIN — NIFEDIPINE 60 MG: 60 TABLET, FILM COATED, EXTENDED RELEASE ORAL at 09:38

## 2020-10-20 RX ADMIN — TORSEMIDE 100 MG: 100 TABLET ORAL at 13:10

## 2020-10-20 RX ADMIN — LISINOPRIL 20 MG: 20 TABLET ORAL at 16:39

## 2020-10-20 RX ADMIN — HEPARIN SODIUM 5000 UNITS: 5000 INJECTION INTRAVENOUS; SUBCUTANEOUS at 05:04

## 2020-10-20 RX ADMIN — LABETALOL HYDROCHLORIDE 300 MG: 100 TABLET, FILM COATED ORAL at 21:32

## 2020-10-20 RX ADMIN — HEPARIN SODIUM: 1000 INJECTION INTRAVENOUS; SUBCUTANEOUS at 21:04

## 2020-10-20 RX ADMIN — HEPARIN SODIUM 5000 UNITS: 5000 INJECTION INTRAVENOUS; SUBCUTANEOUS at 21:32

## 2020-10-20 RX ADMIN — B-COMPLEX W/ C & FOLIC ACID TAB 1 TABLET: TAB at 13:10

## 2020-10-20 RX ADMIN — ESCITALOPRAM OXALATE 10 MG: 10 TABLET ORAL at 09:35

## 2020-10-20 RX ADMIN — ATORVASTATIN CALCIUM 40 MG: 40 TABLET, FILM COATED ORAL at 16:39

## 2020-10-20 RX ADMIN — CEFEPIME HYDROCHLORIDE 1000 MG: 2 INJECTION, POWDER, FOR SOLUTION INTRAVENOUS at 13:09

## 2020-10-20 RX ADMIN — GENTAMICIN SULFATE: 1 CREAM TOPICAL at 09:42

## 2020-10-20 RX ADMIN — INSULIN GLARGINE 5 UNITS: 100 INJECTION, SOLUTION SUBCUTANEOUS at 09:40

## 2020-10-20 RX ADMIN — INSULIN HUMAN 4 UNITS: 100 INJECTION, SUSPENSION SUBCUTANEOUS at 21:07

## 2020-10-20 RX ADMIN — TORSEMIDE 100 MG: 100 TABLET ORAL at 21:32

## 2020-10-20 RX ADMIN — FAMOTIDINE 40 MG: 20 TABLET ORAL at 16:39

## 2020-10-20 RX ADMIN — ONDANSETRON 4 MG: 2 INJECTION INTRAMUSCULAR; INTRAVENOUS at 21:41

## 2020-10-20 RX ADMIN — GABAPENTIN 100 MG: 100 CAPSULE ORAL at 21:31

## 2020-10-20 RX ADMIN — INSULIN LISPRO 3 UNITS: 100 INJECTION, SOLUTION INTRAVENOUS; SUBCUTANEOUS at 13:13

## 2020-10-20 RX ADMIN — FAMOTIDINE 40 MG: 20 TABLET ORAL at 09:37

## 2020-10-20 RX ADMIN — HEPARIN SODIUM 5000 UNITS: 5000 INJECTION INTRAVENOUS; SUBCUTANEOUS at 13:09

## 2020-10-20 RX ADMIN — METOLAZONE 10 MG: 2.5 TABLET ORAL at 13:10

## 2020-10-20 RX ADMIN — ASPIRIN 81 MG CHEWABLE TABLET 81 MG: 81 TABLET CHEWABLE at 09:35

## 2020-10-20 RX ADMIN — CALCIUM ACETATE 2001 MG: 667 CAPSULE ORAL at 09:36

## 2020-10-20 RX ADMIN — DOXAZOSIN 2 MG: 1 TABLET ORAL at 21:31

## 2020-10-20 RX ADMIN — INSULIN LISPRO 3 UNITS: 100 INJECTION, SOLUTION INTRAVENOUS; SUBCUTANEOUS at 21:20

## 2020-10-20 RX ADMIN — CALCIUM ACETATE 2001 MG: 667 CAPSULE ORAL at 16:39

## 2020-10-20 RX ADMIN — INSULIN LISPRO 3 UNITS: 100 INJECTION, SOLUTION INTRAVENOUS; SUBCUTANEOUS at 09:41

## 2020-10-20 RX ADMIN — HYDRALAZINE HYDROCHLORIDE 50 MG: 25 TABLET ORAL at 09:38

## 2020-10-20 RX ADMIN — NIFEDIPINE 60 MG: 60 TABLET, FILM COATED, EXTENDED RELEASE ORAL at 21:07

## 2020-10-20 RX ADMIN — CALCIUM ACETATE 2001 MG: 667 CAPSULE ORAL at 13:09

## 2020-10-20 RX ADMIN — LABETALOL HYDROCHLORIDE 300 MG: 100 TABLET, FILM COATED ORAL at 09:42

## 2020-10-20 RX ADMIN — HYDRALAZINE HYDROCHLORIDE 50 MG: 25 TABLET ORAL at 21:33

## 2020-10-20 RX ADMIN — INSULIN LISPRO 5 UNITS: 100 INJECTION, SOLUTION INTRAVENOUS; SUBCUTANEOUS at 09:42

## 2020-10-21 LAB
ALBUMIN SERPL BCP-MCNC: 2 G/DL (ref 3.5–5)
ALP SERPL-CCNC: 78 U/L (ref 46–116)
ALT SERPL W P-5'-P-CCNC: 21 U/L (ref 12–78)
ANION GAP SERPL CALCULATED.3IONS-SCNC: 9 MMOL/L (ref 4–13)
AST SERPL W P-5'-P-CCNC: 39 U/L (ref 5–45)
BASOPHILS # BLD AUTO: 0.06 THOUSANDS/ΜL (ref 0–0.1)
BASOPHILS NFR BLD AUTO: 1 % (ref 0–1)
BILIRUB SERPL-MCNC: 0.43 MG/DL (ref 0.2–1)
BUN SERPL-MCNC: 44 MG/DL (ref 5–25)
CALCIUM ALBUM COR SERPL-MCNC: 10.8 MG/DL (ref 8.3–10.1)
CALCIUM SERPL-MCNC: 9.2 MG/DL (ref 8.3–10.1)
CHLORIDE SERPL-SCNC: 98 MMOL/L (ref 100–108)
CO2 SERPL-SCNC: 29 MMOL/L (ref 21–32)
CREAT SERPL-MCNC: 11.98 MG/DL (ref 0.6–1.3)
EOSINOPHIL # BLD AUTO: 1.34 THOUSAND/ΜL (ref 0–0.61)
EOSINOPHIL NFR BLD AUTO: 20 % (ref 0–6)
ERYTHROCYTE [DISTWIDTH] IN BLOOD BY AUTOMATED COUNT: 14.6 % (ref 11.6–15.1)
GFR SERPL CREATININE-BSD FRML MDRD: 5 ML/MIN/1.73SQ M
GLUCOSE SERPL-MCNC: 125 MG/DL (ref 65–140)
GLUCOSE SERPL-MCNC: 184 MG/DL (ref 65–140)
GLUCOSE SERPL-MCNC: 229 MG/DL (ref 65–140)
GLUCOSE SERPL-MCNC: 251 MG/DL (ref 65–140)
GLUCOSE SERPL-MCNC: 270 MG/DL (ref 65–140)
HCT VFR BLD AUTO: 28.3 % (ref 36.5–49.3)
HGB BLD-MCNC: 9.1 G/DL (ref 12–17)
IMM GRANULOCYTES # BLD AUTO: 0.02 THOUSAND/UL (ref 0–0.2)
IMM GRANULOCYTES NFR BLD AUTO: 0 % (ref 0–2)
LYMPHOCYTES # BLD AUTO: 0.8 THOUSANDS/ΜL (ref 0.6–4.47)
LYMPHOCYTES NFR BLD AUTO: 12 % (ref 14–44)
MCH RBC QN AUTO: 30.5 PG (ref 26.8–34.3)
MCHC RBC AUTO-ENTMCNC: 32.2 G/DL (ref 31.4–37.4)
MCV RBC AUTO: 95 FL (ref 82–98)
MONOCYTES # BLD AUTO: 0.52 THOUSAND/ΜL (ref 0.17–1.22)
MONOCYTES NFR BLD AUTO: 8 % (ref 4–12)
NEUTROPHILS # BLD AUTO: 3.89 THOUSANDS/ΜL (ref 1.85–7.62)
NEUTS SEG NFR BLD AUTO: 59 % (ref 43–75)
NRBC BLD AUTO-RTO: 0 /100 WBCS
PLATELET # BLD AUTO: 316 THOUSANDS/UL (ref 149–390)
PMV BLD AUTO: 10.1 FL (ref 8.9–12.7)
POTASSIUM SERPL-SCNC: 5.4 MMOL/L (ref 3.5–5.3)
PROT SERPL-MCNC: 5.4 G/DL (ref 6.4–8.2)
RBC # BLD AUTO: 2.98 MILLION/UL (ref 3.88–5.62)
SODIUM SERPL-SCNC: 136 MMOL/L (ref 136–145)
VANCOMYCIN SERPL-MCNC: 17.3 UG/ML
WBC # BLD AUTO: 6.63 THOUSAND/UL (ref 4.31–10.16)
WBC SPEC QL GRAM STN: NORMAL

## 2020-10-21 PROCEDURE — 99232 SBSQ HOSP IP/OBS MODERATE 35: CPT | Performed by: INTERNAL MEDICINE

## 2020-10-21 PROCEDURE — 85025 COMPLETE CBC W/AUTO DIFF WBC: CPT | Performed by: INTERNAL MEDICINE

## 2020-10-21 PROCEDURE — 80053 COMPREHEN METABOLIC PANEL: CPT | Performed by: INTERNAL MEDICINE

## 2020-10-21 PROCEDURE — 86682 HELMINTH ANTIBODY: CPT | Performed by: INTERNAL MEDICINE

## 2020-10-21 PROCEDURE — 82948 REAGENT STRIP/BLOOD GLUCOSE: CPT

## 2020-10-21 PROCEDURE — 80202 ASSAY OF VANCOMYCIN: CPT | Performed by: INTERNAL MEDICINE

## 2020-10-21 RX ORDER — INSULIN GLARGINE 100 [IU]/ML
6 INJECTION, SOLUTION SUBCUTANEOUS
Status: DISCONTINUED | OUTPATIENT
Start: 2020-10-22 | End: 2020-10-23

## 2020-10-21 RX ORDER — SEVELAMER HYDROCHLORIDE 800 MG/1
1600 TABLET, FILM COATED ORAL
Status: DISCONTINUED | OUTPATIENT
Start: 2020-10-21 | End: 2020-10-26 | Stop reason: HOSPADM

## 2020-10-21 RX ADMIN — DICYCLOMINE HYDROCHLORIDE 20 MG: 20 TABLET ORAL at 08:40

## 2020-10-21 RX ADMIN — ONDANSETRON 4 MG: 2 INJECTION INTRAMUSCULAR; INTRAVENOUS at 04:06

## 2020-10-21 RX ADMIN — HEPARIN SODIUM: 1000 INJECTION INTRAVENOUS; SUBCUTANEOUS at 20:37

## 2020-10-21 RX ADMIN — DOXAZOSIN 2 MG: 1 TABLET ORAL at 22:23

## 2020-10-21 RX ADMIN — INSULIN LISPRO 4 UNITS: 100 INJECTION, SOLUTION INTRAVENOUS; SUBCUTANEOUS at 12:18

## 2020-10-21 RX ADMIN — ATORVASTATIN CALCIUM 40 MG: 40 TABLET, FILM COATED ORAL at 17:08

## 2020-10-21 RX ADMIN — HEPARIN SODIUM 5000 UNITS: 5000 INJECTION INTRAVENOUS; SUBCUTANEOUS at 22:25

## 2020-10-21 RX ADMIN — ONDANSETRON 4 MG: 2 INJECTION INTRAMUSCULAR; INTRAVENOUS at 12:14

## 2020-10-21 RX ADMIN — HEPARIN SODIUM: 1000 INJECTION INTRAVENOUS; SUBCUTANEOUS at 20:36

## 2020-10-21 RX ADMIN — LABETALOL HYDROCHLORIDE 300 MG: 100 TABLET, FILM COATED ORAL at 08:34

## 2020-10-21 RX ADMIN — NIFEDIPINE 60 MG: 60 TABLET, FILM COATED, EXTENDED RELEASE ORAL at 22:31

## 2020-10-21 RX ADMIN — CINACALCET 60 MG: 30 TABLET, FILM COATED ORAL at 08:34

## 2020-10-21 RX ADMIN — HYDRALAZINE HYDROCHLORIDE 50 MG: 25 TABLET ORAL at 17:08

## 2020-10-21 RX ADMIN — HEPARIN SODIUM 5000 UNITS: 5000 INJECTION INTRAVENOUS; SUBCUTANEOUS at 05:08

## 2020-10-21 RX ADMIN — NIFEDIPINE 60 MG: 60 TABLET, FILM COATED, EXTENDED RELEASE ORAL at 08:35

## 2020-10-21 RX ADMIN — HYDRALAZINE HYDROCHLORIDE 50 MG: 25 TABLET ORAL at 22:23

## 2020-10-21 RX ADMIN — FAMOTIDINE 40 MG: 20 TABLET ORAL at 17:08

## 2020-10-21 RX ADMIN — LOPERAMIDE HYDROCHLORIDE 2 MG: 2 CAPSULE ORAL at 08:40

## 2020-10-21 RX ADMIN — CALCIUM ACETATE 2001 MG: 667 CAPSULE ORAL at 08:34

## 2020-10-21 RX ADMIN — TORSEMIDE 100 MG: 100 TABLET ORAL at 22:23

## 2020-10-21 RX ADMIN — GENTAMICIN SULFATE: 1 CREAM TOPICAL at 08:41

## 2020-10-21 RX ADMIN — FAMOTIDINE 40 MG: 20 TABLET ORAL at 08:34

## 2020-10-21 RX ADMIN — GABAPENTIN 100 MG: 100 CAPSULE ORAL at 22:23

## 2020-10-21 RX ADMIN — INSULIN GLARGINE 5 UNITS: 100 INJECTION, SOLUTION SUBCUTANEOUS at 12:15

## 2020-10-21 RX ADMIN — ASPIRIN 81 MG CHEWABLE TABLET 81 MG: 81 TABLET CHEWABLE at 08:34

## 2020-10-21 RX ADMIN — HYDRALAZINE HYDROCHLORIDE 50 MG: 25 TABLET ORAL at 08:34

## 2020-10-21 RX ADMIN — INSULIN HUMAN 4 UNITS: 100 INJECTION, SUSPENSION SUBCUTANEOUS at 22:26

## 2020-10-21 RX ADMIN — VANCOMYCIN HYDROCHLORIDE 1250 MG: 1 INJECTION, POWDER, LYOPHILIZED, FOR SOLUTION INTRAVENOUS at 08:47

## 2020-10-21 RX ADMIN — ESCITALOPRAM OXALATE 10 MG: 10 TABLET ORAL at 08:34

## 2020-10-21 RX ADMIN — HEPARIN SODIUM 5000 UNITS: 5000 INJECTION INTRAVENOUS; SUBCUTANEOUS at 13:45

## 2020-10-21 RX ADMIN — SEVELAMER HYDROCHLORIDE 1600 MG: 800 TABLET, FILM COATED PARENTERAL at 17:11

## 2020-10-21 RX ADMIN — LABETALOL HYDROCHLORIDE 300 MG: 100 TABLET, FILM COATED ORAL at 22:23

## 2020-10-21 RX ADMIN — INSULIN LISPRO 2 UNITS: 100 INJECTION, SOLUTION INTRAVENOUS; SUBCUTANEOUS at 17:29

## 2020-10-21 RX ADMIN — CEFEPIME HYDROCHLORIDE 1000 MG: 2 INJECTION, POWDER, FOR SOLUTION INTRAVENOUS at 12:19

## 2020-10-22 LAB
ALBUMIN SERPL BCP-MCNC: 2 G/DL (ref 3.5–5)
ALP SERPL-CCNC: 71 U/L (ref 46–116)
ALT SERPL W P-5'-P-CCNC: 12 U/L (ref 12–78)
ANION GAP SERPL CALCULATED.3IONS-SCNC: 9 MMOL/L (ref 4–13)
AST SERPL W P-5'-P-CCNC: 18 U/L (ref 5–45)
BACTERIA SPEC BFLD CULT: NO GROWTH
BASOPHILS # BLD AUTO: 0.09 THOUSANDS/ΜL (ref 0–0.1)
BASOPHILS NFR BLD AUTO: 1 % (ref 0–1)
BILIRUB SERPL-MCNC: 0.39 MG/DL (ref 0.2–1)
BUN SERPL-MCNC: 42 MG/DL (ref 5–25)
CALCIUM ALBUM COR SERPL-MCNC: 10.6 MG/DL (ref 8.3–10.1)
CALCIUM SERPL-MCNC: 9 MG/DL (ref 8.3–10.1)
CHLORIDE SERPL-SCNC: 99 MMOL/L (ref 100–108)
CO2 SERPL-SCNC: 28 MMOL/L (ref 21–32)
CREAT SERPL-MCNC: 11.86 MG/DL (ref 0.6–1.3)
EOSINOPHIL # BLD AUTO: 1.36 THOUSAND/ΜL (ref 0–0.61)
EOSINOPHIL NFR BLD AUTO: 20 % (ref 0–6)
ERYTHROCYTE [DISTWIDTH] IN BLOOD BY AUTOMATED COUNT: 14.8 % (ref 11.6–15.1)
GFR SERPL CREATININE-BSD FRML MDRD: 5 ML/MIN/1.73SQ M
GLUCOSE SERPL-MCNC: 166 MG/DL (ref 65–140)
GLUCOSE SERPL-MCNC: 205 MG/DL (ref 65–140)
GLUCOSE SERPL-MCNC: 220 MG/DL (ref 65–140)
GLUCOSE SERPL-MCNC: 231 MG/DL (ref 65–140)
GLUCOSE SERPL-MCNC: 305 MG/DL (ref 65–140)
GRAM STN SPEC: NORMAL
GRAM STN SPEC: NORMAL
HCT VFR BLD AUTO: 28.1 % (ref 36.5–49.3)
HGB BLD-MCNC: 9.2 G/DL (ref 12–17)
IMM GRANULOCYTES # BLD AUTO: 0.03 THOUSAND/UL (ref 0–0.2)
IMM GRANULOCYTES NFR BLD AUTO: 0 % (ref 0–2)
LYMPHOCYTES # BLD AUTO: 0.98 THOUSANDS/ΜL (ref 0.6–4.47)
LYMPHOCYTES NFR BLD AUTO: 14 % (ref 14–44)
MCH RBC QN AUTO: 31.1 PG (ref 26.8–34.3)
MCHC RBC AUTO-ENTMCNC: 32.7 G/DL (ref 31.4–37.4)
MCV RBC AUTO: 95 FL (ref 82–98)
MONOCYTES # BLD AUTO: 0.51 THOUSAND/ΜL (ref 0.17–1.22)
MONOCYTES NFR BLD AUTO: 8 % (ref 4–12)
NEUTROPHILS # BLD AUTO: 3.84 THOUSANDS/ΜL (ref 1.85–7.62)
NEUTS SEG NFR BLD AUTO: 57 % (ref 43–75)
NRBC BLD AUTO-RTO: 0 /100 WBCS
PHOSPHATE SERPL-MCNC: 6 MG/DL (ref 2.7–4.5)
PLATELET # BLD AUTO: 299 THOUSANDS/UL (ref 149–390)
PMV BLD AUTO: 9.9 FL (ref 8.9–12.7)
POTASSIUM SERPL-SCNC: 4.4 MMOL/L (ref 3.5–5.3)
PROT SERPL-MCNC: 5.1 G/DL (ref 6.4–8.2)
PTH-INTACT SERPL-MCNC: 158.2 PG/ML (ref 18.4–80.1)
RBC # BLD AUTO: 2.96 MILLION/UL (ref 3.88–5.62)
SODIUM SERPL-SCNC: 136 MMOL/L (ref 136–145)
VANCOMYCIN SERPL-MCNC: 27.2 UG/ML
WBC # BLD AUTO: 6.81 THOUSAND/UL (ref 4.31–10.16)

## 2020-10-22 PROCEDURE — 80053 COMPREHEN METABOLIC PANEL: CPT | Performed by: INTERNAL MEDICINE

## 2020-10-22 PROCEDURE — 99232 SBSQ HOSP IP/OBS MODERATE 35: CPT | Performed by: INTERNAL MEDICINE

## 2020-10-22 PROCEDURE — 83970 ASSAY OF PARATHORMONE: CPT | Performed by: INTERNAL MEDICINE

## 2020-10-22 PROCEDURE — 82948 REAGENT STRIP/BLOOD GLUCOSE: CPT

## 2020-10-22 PROCEDURE — 84100 ASSAY OF PHOSPHORUS: CPT | Performed by: INTERNAL MEDICINE

## 2020-10-22 PROCEDURE — 99223 1ST HOSP IP/OBS HIGH 75: CPT | Performed by: INTERNAL MEDICINE

## 2020-10-22 PROCEDURE — 80202 ASSAY OF VANCOMYCIN: CPT | Performed by: INTERNAL MEDICINE

## 2020-10-22 PROCEDURE — 85025 COMPLETE CBC W/AUTO DIFF WBC: CPT | Performed by: INTERNAL MEDICINE

## 2020-10-22 RX ORDER — HYDRALAZINE HYDROCHLORIDE 25 MG/1
50 TABLET, FILM COATED ORAL 3 TIMES DAILY
Status: DISCONTINUED | OUTPATIENT
Start: 2020-10-22 | End: 2020-10-22

## 2020-10-22 RX ORDER — HYDRALAZINE HYDROCHLORIDE 25 MG/1
50 TABLET, FILM COATED ORAL EVERY 8 HOURS SCHEDULED
Status: DISCONTINUED | OUTPATIENT
Start: 2020-10-22 | End: 2020-10-26 | Stop reason: HOSPADM

## 2020-10-22 RX ADMIN — INSULIN LISPRO 1 UNITS: 100 INJECTION, SOLUTION INTRAVENOUS; SUBCUTANEOUS at 18:24

## 2020-10-22 RX ADMIN — B-COMPLEX W/ C & FOLIC ACID TAB 1 TABLET: TAB at 13:12

## 2020-10-22 RX ADMIN — SEVELAMER HYDROCHLORIDE 1600 MG: 800 TABLET, FILM COATED PARENTERAL at 13:12

## 2020-10-22 RX ADMIN — ATORVASTATIN CALCIUM 40 MG: 40 TABLET, FILM COATED ORAL at 18:26

## 2020-10-22 RX ADMIN — SEVELAMER HYDROCHLORIDE 1600 MG: 800 TABLET, FILM COATED PARENTERAL at 09:38

## 2020-10-22 RX ADMIN — INSULIN GLARGINE 6 UNITS: 100 INJECTION, SOLUTION SUBCUTANEOUS at 09:40

## 2020-10-22 RX ADMIN — TORSEMIDE 100 MG: 100 TABLET ORAL at 22:01

## 2020-10-22 RX ADMIN — HYDRALAZINE HYDROCHLORIDE 50 MG: 25 TABLET, FILM COATED ORAL at 22:01

## 2020-10-22 RX ADMIN — DOXAZOSIN 2 MG: 1 TABLET ORAL at 22:01

## 2020-10-22 RX ADMIN — LABETALOL HYDROCHLORIDE 300 MG: 100 TABLET, FILM COATED ORAL at 22:01

## 2020-10-22 RX ADMIN — FAMOTIDINE 40 MG: 20 TABLET ORAL at 09:38

## 2020-10-22 RX ADMIN — GABAPENTIN 100 MG: 100 CAPSULE ORAL at 22:01

## 2020-10-22 RX ADMIN — POLYETHYLENE GLYCOL 3350, SODIUM SULFATE ANHYDROUS, SODIUM BICARBONATE, SODIUM CHLORIDE, POTASSIUM CHLORIDE 4000 ML: 236; 22.74; 6.74; 5.86; 2.97 POWDER, FOR SOLUTION ORAL at 16:17

## 2020-10-22 RX ADMIN — HEPARIN SODIUM 5000 UNITS: 5000 INJECTION INTRAVENOUS; SUBCUTANEOUS at 05:28

## 2020-10-22 RX ADMIN — HYDRALAZINE HYDROCHLORIDE 50 MG: 25 TABLET, FILM COATED ORAL at 16:37

## 2020-10-22 RX ADMIN — INSULIN LISPRO 2 UNITS: 100 INJECTION, SOLUTION INTRAVENOUS; SUBCUTANEOUS at 22:03

## 2020-10-22 RX ADMIN — BISACODYL 10 MG: 5 TABLET, COATED ORAL at 16:17

## 2020-10-22 RX ADMIN — NIFEDIPINE 60 MG: 60 TABLET, FILM COATED, EXTENDED RELEASE ORAL at 09:42

## 2020-10-22 RX ADMIN — GENTAMICIN SULFATE: 1 CREAM TOPICAL at 09:23

## 2020-10-22 RX ADMIN — SEVELAMER HYDROCHLORIDE 1600 MG: 800 TABLET, FILM COATED PARENTERAL at 18:23

## 2020-10-22 RX ADMIN — FAMOTIDINE 40 MG: 20 TABLET ORAL at 18:23

## 2020-10-22 RX ADMIN — HEPARIN SODIUM 5000 UNITS: 5000 INJECTION INTRAVENOUS; SUBCUTANEOUS at 22:01

## 2020-10-22 RX ADMIN — HEPARIN SODIUM 5000 UNITS: 5000 INJECTION INTRAVENOUS; SUBCUTANEOUS at 13:12

## 2020-10-22 RX ADMIN — LABETALOL HYDROCHLORIDE 300 MG: 100 TABLET, FILM COATED ORAL at 09:44

## 2020-10-22 RX ADMIN — ESCITALOPRAM OXALATE 10 MG: 10 TABLET ORAL at 09:40

## 2020-10-22 RX ADMIN — INSULIN LISPRO 5 UNITS: 100 INJECTION, SOLUTION INTRAVENOUS; SUBCUTANEOUS at 18:24

## 2020-10-22 RX ADMIN — METOLAZONE 10 MG: 2.5 TABLET ORAL at 13:13

## 2020-10-22 RX ADMIN — NIFEDIPINE 60 MG: 60 TABLET, FILM COATED, EXTENDED RELEASE ORAL at 22:04

## 2020-10-22 RX ADMIN — LISINOPRIL 20 MG: 20 TABLET ORAL at 09:40

## 2020-10-22 RX ADMIN — CEFEPIME HYDROCHLORIDE 1000 MG: 2 INJECTION, POWDER, FOR SOLUTION INTRAVENOUS at 13:12

## 2020-10-22 RX ADMIN — TORSEMIDE 100 MG: 100 TABLET ORAL at 13:12

## 2020-10-22 RX ADMIN — ASPIRIN 81 MG CHEWABLE TABLET 81 MG: 81 TABLET CHEWABLE at 09:39

## 2020-10-22 RX ADMIN — INSULIN HUMAN 4 UNITS: 100 INJECTION, SUSPENSION SUBCUTANEOUS at 22:02

## 2020-10-22 RX ADMIN — INSULIN LISPRO 5 UNITS: 100 INJECTION, SOLUTION INTRAVENOUS; SUBCUTANEOUS at 09:42

## 2020-10-23 ENCOUNTER — ANESTHESIA EVENT (INPATIENT)
Dept: GASTROENTEROLOGY | Facility: HOSPITAL | Age: 35
DRG: 371 | End: 2020-10-23
Payer: MEDICARE

## 2020-10-23 ENCOUNTER — APPOINTMENT (INPATIENT)
Dept: GASTROENTEROLOGY | Facility: HOSPITAL | Age: 35
DRG: 371 | End: 2020-10-23
Payer: MEDICARE

## 2020-10-23 ENCOUNTER — ANESTHESIA (INPATIENT)
Dept: GASTROENTEROLOGY | Facility: HOSPITAL | Age: 35
DRG: 371 | End: 2020-10-23
Payer: MEDICARE

## 2020-10-23 VITALS — HEART RATE: 84 BPM

## 2020-10-23 PROBLEM — N25.81 SECONDARY HYPERPARATHYROIDISM (HCC): Status: ACTIVE | Noted: 2020-10-23

## 2020-10-23 LAB
ANION GAP SERPL CALCULATED.3IONS-SCNC: 10 MMOL/L (ref 4–13)
BACTERIA SPEC BFLD CULT: NO GROWTH
BUN SERPL-MCNC: 37 MG/DL (ref 5–25)
CALCIUM SERPL-MCNC: 8.8 MG/DL (ref 8.3–10.1)
CHLORIDE SERPL-SCNC: 98 MMOL/L (ref 100–108)
CO2 SERPL-SCNC: 28 MMOL/L (ref 21–32)
CREAT SERPL-MCNC: 11.55 MG/DL (ref 0.6–1.3)
GFR SERPL CREATININE-BSD FRML MDRD: 5 ML/MIN/1.73SQ M
GLUCOSE SERPL-MCNC: 154 MG/DL (ref 65–140)
GLUCOSE SERPL-MCNC: 204 MG/DL (ref 65–140)
GLUCOSE SERPL-MCNC: 236 MG/DL (ref 65–140)
GLUCOSE SERPL-MCNC: 268 MG/DL (ref 65–140)
GLUCOSE SERPL-MCNC: 285 MG/DL (ref 65–140)
GLUCOSE SERPL-MCNC: 337 MG/DL (ref 65–140)
GRAM STN SPEC: NORMAL
O+P STL CONC: NORMAL
POTASSIUM SERPL-SCNC: 4.3 MMOL/L (ref 3.5–5.3)
SODIUM SERPL-SCNC: 136 MMOL/L (ref 136–145)
VANCOMYCIN SERPL-MCNC: 22.2 UG/ML

## 2020-10-23 PROCEDURE — 99232 SBSQ HOSP IP/OBS MODERATE 35: CPT | Performed by: INTERNAL MEDICINE

## 2020-10-23 PROCEDURE — 82948 REAGENT STRIP/BLOOD GLUCOSE: CPT

## 2020-10-23 PROCEDURE — 88305 TISSUE EXAM BY PATHOLOGIST: CPT | Performed by: PATHOLOGY

## 2020-10-23 PROCEDURE — 80202 ASSAY OF VANCOMYCIN: CPT | Performed by: INTERNAL MEDICINE

## 2020-10-23 PROCEDURE — 0DBM8ZX EXCISION OF DESCENDING COLON, VIA NATURAL OR ARTIFICIAL OPENING ENDOSCOPIC, DIAGNOSTIC: ICD-10-PCS | Performed by: INTERNAL MEDICINE

## 2020-10-23 PROCEDURE — 45385 COLONOSCOPY W/LESION REMOVAL: CPT | Performed by: INTERNAL MEDICINE

## 2020-10-23 PROCEDURE — 80048 BASIC METABOLIC PNL TOTAL CA: CPT | Performed by: INTERNAL MEDICINE

## 2020-10-23 PROCEDURE — 0W3P8ZZ CONTROL BLEEDING IN GASTROINTESTINAL TRACT, VIA NATURAL OR ARTIFICIAL OPENING ENDOSCOPIC: ICD-10-PCS | Performed by: INTERNAL MEDICINE

## 2020-10-23 RX ORDER — FENTANYL CITRATE/PF 50 MCG/ML
25 SYRINGE (ML) INJECTION
Status: CANCELLED | OUTPATIENT
Start: 2020-10-23

## 2020-10-23 RX ORDER — KETAMINE HYDROCHLORIDE 50 MG/ML
INJECTION, SOLUTION, CONCENTRATE INTRAMUSCULAR; INTRAVENOUS AS NEEDED
Status: DISCONTINUED | OUTPATIENT
Start: 2020-10-23 | End: 2020-10-23

## 2020-10-23 RX ORDER — PROPOFOL 10 MG/ML
INJECTION, EMULSION INTRAVENOUS CONTINUOUS PRN
Status: DISCONTINUED | OUTPATIENT
Start: 2020-10-23 | End: 2020-10-23

## 2020-10-23 RX ORDER — SODIUM CHLORIDE, SODIUM LACTATE, POTASSIUM CHLORIDE, CALCIUM CHLORIDE 600; 310; 30; 20 MG/100ML; MG/100ML; MG/100ML; MG/100ML
INJECTION, SOLUTION INTRAVENOUS CONTINUOUS PRN
Status: DISCONTINUED | OUTPATIENT
Start: 2020-10-23 | End: 2020-10-23

## 2020-10-23 RX ORDER — HYDRALAZINE HYDROCHLORIDE 20 MG/ML
5 INJECTION INTRAMUSCULAR; INTRAVENOUS ONCE AS NEEDED
Status: CANCELLED | OUTPATIENT
Start: 2020-10-23

## 2020-10-23 RX ORDER — PROPOFOL 10 MG/ML
INJECTION, EMULSION INTRAVENOUS AS NEEDED
Status: DISCONTINUED | OUTPATIENT
Start: 2020-10-23 | End: 2020-10-23

## 2020-10-23 RX ORDER — SODIUM CHLORIDE, SODIUM LACTATE, POTASSIUM CHLORIDE, CALCIUM CHLORIDE 600; 310; 30; 20 MG/100ML; MG/100ML; MG/100ML; MG/100ML
50 INJECTION, SOLUTION INTRAVENOUS CONTINUOUS
Status: CANCELLED | OUTPATIENT
Start: 2020-10-23

## 2020-10-23 RX ORDER — HYDRALAZINE HYDROCHLORIDE 20 MG/ML
INJECTION INTRAMUSCULAR; INTRAVENOUS AS NEEDED
Status: DISCONTINUED | OUTPATIENT
Start: 2020-10-23 | End: 2020-10-23

## 2020-10-23 RX ORDER — HYDROMORPHONE HCL/PF 1 MG/ML
0.2 SYRINGE (ML) INJECTION
Status: CANCELLED | OUTPATIENT
Start: 2020-10-23

## 2020-10-23 RX ORDER — HYDRALAZINE HYDROCHLORIDE 20 MG/ML
5 INJECTION INTRAMUSCULAR; INTRAVENOUS ONCE
Status: CANCELLED | OUTPATIENT
Start: 2020-10-23

## 2020-10-23 RX ORDER — GENTAMICIN SULFATE 1 MG/G
OINTMENT TOPICAL
Status: DISCONTINUED | OUTPATIENT
Start: 2020-10-23 | End: 2020-10-26 | Stop reason: HOSPADM

## 2020-10-23 RX ORDER — LIDOCAINE HYDROCHLORIDE 10 MG/ML
INJECTION, SOLUTION EPIDURAL; INFILTRATION; INTRACAUDAL; PERINEURAL AS NEEDED
Status: DISCONTINUED | OUTPATIENT
Start: 2020-10-23 | End: 2020-10-23

## 2020-10-23 RX ORDER — INSULIN GLARGINE 100 [IU]/ML
10 INJECTION, SOLUTION SUBCUTANEOUS
Status: DISCONTINUED | OUTPATIENT
Start: 2020-10-24 | End: 2020-10-24

## 2020-10-23 RX ORDER — LABETALOL 20 MG/4 ML (5 MG/ML) INTRAVENOUS SYRINGE
10 ONCE
Status: CANCELLED | OUTPATIENT
Start: 2020-10-23

## 2020-10-23 RX ADMIN — CINACALCET 60 MG: 30 TABLET, FILM COATED ORAL at 10:41

## 2020-10-23 RX ADMIN — SEVELAMER HYDROCHLORIDE 1600 MG: 800 TABLET, FILM COATED PARENTERAL at 19:15

## 2020-10-23 RX ADMIN — KETAMINE HYDROCHLORIDE 25 MG: 50 INJECTION INTRAMUSCULAR; INTRAVENOUS at 15:59

## 2020-10-23 RX ADMIN — TORSEMIDE 100 MG: 100 TABLET ORAL at 12:35

## 2020-10-23 RX ADMIN — NIFEDIPINE 60 MG: 60 TABLET, FILM COATED, EXTENDED RELEASE ORAL at 21:59

## 2020-10-23 RX ADMIN — KETAMINE HYDROCHLORIDE 10 MG: 50 INJECTION INTRAMUSCULAR; INTRAVENOUS at 16:03

## 2020-10-23 RX ADMIN — PROPOFOL 120 MCG/KG/MIN: 10 INJECTION, EMULSION INTRAVENOUS at 15:56

## 2020-10-23 RX ADMIN — LABETALOL HYDROCHLORIDE 300 MG: 100 TABLET, FILM COATED ORAL at 10:41

## 2020-10-23 RX ADMIN — NIFEDIPINE 60 MG: 60 TABLET, FILM COATED, EXTENDED RELEASE ORAL at 12:29

## 2020-10-23 RX ADMIN — INSULIN HUMAN 4 UNITS: 100 INJECTION, SUSPENSION SUBCUTANEOUS at 23:27

## 2020-10-23 RX ADMIN — PROPOFOL 100 MG: 10 INJECTION, EMULSION INTRAVENOUS at 15:56

## 2020-10-23 RX ADMIN — FAMOTIDINE 40 MG: 20 TABLET ORAL at 19:16

## 2020-10-23 RX ADMIN — KETAMINE HYDROCHLORIDE 15 MG: 50 INJECTION INTRAMUSCULAR; INTRAVENOUS at 16:07

## 2020-10-23 RX ADMIN — GENTAMICIN SULFATE: 1 OINTMENT TOPICAL at 22:13

## 2020-10-23 RX ADMIN — CEFEPIME HYDROCHLORIDE 1000 MG: 2 INJECTION, POWDER, FOR SOLUTION INTRAVENOUS at 12:35

## 2020-10-23 RX ADMIN — SODIUM CHLORIDE, SODIUM LACTATE, POTASSIUM CHLORIDE, AND CALCIUM CHLORIDE: .6; .31; .03; .02 INJECTION, SOLUTION INTRAVENOUS at 15:19

## 2020-10-23 RX ADMIN — INSULIN LISPRO 3 UNITS: 100 INJECTION, SOLUTION INTRAVENOUS; SUBCUTANEOUS at 22:08

## 2020-10-23 RX ADMIN — ONDANSETRON 4 MG: 2 INJECTION INTRAMUSCULAR; INTRAVENOUS at 22:26

## 2020-10-23 RX ADMIN — GABAPENTIN 100 MG: 100 CAPSULE ORAL at 22:02

## 2020-10-23 RX ADMIN — HYDRALAZINE HYDROCHLORIDE 5 MG: 20 INJECTION INTRAMUSCULAR; INTRAVENOUS at 16:14

## 2020-10-23 RX ADMIN — HYDRALAZINE HYDROCHLORIDE 10 MG: 20 INJECTION INTRAMUSCULAR; INTRAVENOUS at 16:22

## 2020-10-23 RX ADMIN — DOXAZOSIN 2 MG: 1 TABLET ORAL at 21:59

## 2020-10-23 RX ADMIN — LABETALOL HYDROCHLORIDE 300 MG: 100 TABLET, FILM COATED ORAL at 22:21

## 2020-10-23 RX ADMIN — LISINOPRIL 20 MG: 20 TABLET ORAL at 10:40

## 2020-10-23 RX ADMIN — INSULIN LISPRO 5 UNITS: 100 INJECTION, SOLUTION INTRAVENOUS; SUBCUTANEOUS at 20:06

## 2020-10-23 RX ADMIN — ESCITALOPRAM OXALATE 10 MG: 10 TABLET ORAL at 10:41

## 2020-10-23 RX ADMIN — FAMOTIDINE 40 MG: 20 TABLET ORAL at 10:41

## 2020-10-23 RX ADMIN — LIDOCAINE HYDROCHLORIDE 50 MG: 10 INJECTION, SOLUTION EPIDURAL; INFILTRATION; INTRACAUDAL at 15:56

## 2020-10-23 RX ADMIN — HYDRALAZINE HYDROCHLORIDE 50 MG: 25 TABLET, FILM COATED ORAL at 22:01

## 2020-10-23 RX ADMIN — PROPOFOL 50 MG: 10 INJECTION, EMULSION INTRAVENOUS at 16:06

## 2020-10-23 RX ADMIN — TORSEMIDE 100 MG: 100 TABLET ORAL at 22:01

## 2020-10-23 RX ADMIN — HEPARIN SODIUM 5000 UNITS: 5000 INJECTION INTRAVENOUS; SUBCUTANEOUS at 22:03

## 2020-10-23 RX ADMIN — ATORVASTATIN CALCIUM 40 MG: 40 TABLET, FILM COATED ORAL at 19:15

## 2020-10-24 ENCOUNTER — TELEPHONE (OUTPATIENT)
Dept: GASTROENTEROLOGY | Facility: AMBULARY SURGERY CENTER | Age: 35
End: 2020-10-24

## 2020-10-24 PROBLEM — R11.10 EMESIS: Status: ACTIVE | Noted: 2020-10-24

## 2020-10-24 LAB
ALBUMIN SERPL BCP-MCNC: 1.9 G/DL (ref 3.5–5)
ALP SERPL-CCNC: 71 U/L (ref 46–116)
ALT SERPL W P-5'-P-CCNC: 26 U/L (ref 12–78)
ANION GAP SERPL CALCULATED.3IONS-SCNC: 13 MMOL/L (ref 4–13)
APPEARANCE FLD: CLEAR
AST SERPL W P-5'-P-CCNC: 24 U/L (ref 5–45)
BASOPHILS # BLD AUTO: 0.09 THOUSANDS/ΜL (ref 0–0.1)
BASOPHILS NFR BLD AUTO: 1 % (ref 0–1)
BASOPHILS NFR FLD MANUAL: 1 %
BILIRUB SERPL-MCNC: 0.35 MG/DL (ref 0.2–1)
BUN SERPL-MCNC: 38 MG/DL (ref 5–25)
CALCIUM ALBUM COR SERPL-MCNC: 10.6 MG/DL (ref 8.3–10.1)
CALCIUM SERPL-MCNC: 8.9 MG/DL (ref 8.3–10.1)
CHLORIDE SERPL-SCNC: 99 MMOL/L (ref 100–108)
CO2 SERPL-SCNC: 25 MMOL/L (ref 21–32)
COLOR FLD: COLORLESS
CREAT SERPL-MCNC: 12.27 MG/DL (ref 0.6–1.3)
EOSINOPHIL # BLD AUTO: 1.09 THOUSAND/ΜL (ref 0–0.61)
EOSINOPHIL NFR BLD AUTO: 16 % (ref 0–6)
EOSINOPHIL NFR FLD MANUAL: 23 %
ERYTHROCYTE [DISTWIDTH] IN BLOOD BY AUTOMATED COUNT: 14.4 % (ref 11.6–15.1)
GFR SERPL CREATININE-BSD FRML MDRD: 5 ML/MIN/1.73SQ M
GLUCOSE SERPL-MCNC: 179 MG/DL (ref 65–140)
GLUCOSE SERPL-MCNC: 187 MG/DL (ref 65–140)
GLUCOSE SERPL-MCNC: 199 MG/DL (ref 65–140)
GLUCOSE SERPL-MCNC: 240 MG/DL (ref 65–140)
GLUCOSE SERPL-MCNC: 263 MG/DL (ref 65–140)
GLUCOSE SERPL-MCNC: 96 MG/DL (ref 65–140)
HCT VFR BLD AUTO: 27.2 % (ref 36.5–49.3)
HGB BLD-MCNC: 9 G/DL (ref 12–17)
IMM GRANULOCYTES # BLD AUTO: 0.03 THOUSAND/UL (ref 0–0.2)
IMM GRANULOCYTES NFR BLD AUTO: 0 % (ref 0–2)
LYMPHOCYTES # BLD AUTO: 1.08 THOUSANDS/ΜL (ref 0.6–4.47)
LYMPHOCYTES NFR BLD AUTO: 16 % (ref 14–44)
LYMPHOCYTES NFR BLD AUTO: 43 %
MCH RBC QN AUTO: 30.8 PG (ref 26.8–34.3)
MCHC RBC AUTO-ENTMCNC: 33.1 G/DL (ref 31.4–37.4)
MCV RBC AUTO: 93 FL (ref 82–98)
MONOCYTES # BLD AUTO: 0.55 THOUSAND/ΜL (ref 0.17–1.22)
MONOCYTES NFR BLD AUTO: 12 %
MONOCYTES NFR BLD AUTO: 8 % (ref 4–12)
NEUTROPHILS # BLD AUTO: 3.9 THOUSANDS/ΜL (ref 1.85–7.62)
NEUTS SEG NFR BLD AUTO: 21 %
NEUTS SEG NFR BLD AUTO: 59 % (ref 43–75)
NRBC BLD AUTO-RTO: 0 /100 WBCS
PHOSPHATE SERPL-MCNC: 6.6 MG/DL (ref 2.7–4.5)
PLATELET # BLD AUTO: 291 THOUSANDS/UL (ref 149–390)
PMV BLD AUTO: 9.5 FL (ref 8.9–12.7)
POTASSIUM SERPL-SCNC: 4.1 MMOL/L (ref 3.5–5.3)
PROT SERPL-MCNC: 5 G/DL (ref 6.4–8.2)
RBC # BLD AUTO: 2.92 MILLION/UL (ref 3.88–5.62)
SITE: NORMAL
SODIUM SERPL-SCNC: 137 MMOL/L (ref 136–145)
TOTAL CELLS COUNTED SPEC: 100
WBC # BLD AUTO: 6.74 THOUSAND/UL (ref 4.31–10.16)
WBC # FLD MANUAL: 74 /UL

## 2020-10-24 PROCEDURE — 85025 COMPLETE CBC W/AUTO DIFF WBC: CPT | Performed by: INTERNAL MEDICINE

## 2020-10-24 PROCEDURE — 99232 SBSQ HOSP IP/OBS MODERATE 35: CPT | Performed by: INTERNAL MEDICINE

## 2020-10-24 PROCEDURE — 84100 ASSAY OF PHOSPHORUS: CPT | Performed by: INTERNAL MEDICINE

## 2020-10-24 PROCEDURE — 80053 COMPREHEN METABOLIC PANEL: CPT | Performed by: INTERNAL MEDICINE

## 2020-10-24 PROCEDURE — 87205 SMEAR GRAM STAIN: CPT | Performed by: INTERNAL MEDICINE

## 2020-10-24 PROCEDURE — 87070 CULTURE OTHR SPECIMN AEROBIC: CPT | Performed by: INTERNAL MEDICINE

## 2020-10-24 PROCEDURE — 82948 REAGENT STRIP/BLOOD GLUCOSE: CPT

## 2020-10-24 PROCEDURE — 89051 BODY FLUID CELL COUNT: CPT | Performed by: INTERNAL MEDICINE

## 2020-10-24 RX ORDER — METOCLOPRAMIDE HYDROCHLORIDE 5 MG/ML
5 INJECTION INTRAMUSCULAR; INTRAVENOUS EVERY 8 HOURS
Status: DISCONTINUED | OUTPATIENT
Start: 2020-10-24 | End: 2020-10-24

## 2020-10-24 RX ORDER — METOCLOPRAMIDE HYDROCHLORIDE 5 MG/ML
10 INJECTION INTRAMUSCULAR; INTRAVENOUS EVERY 8 HOURS
Status: DISCONTINUED | OUTPATIENT
Start: 2020-10-24 | End: 2020-10-24

## 2020-10-24 RX ORDER — INSULIN GLARGINE 100 [IU]/ML
15 INJECTION, SOLUTION SUBCUTANEOUS
Status: DISCONTINUED | OUTPATIENT
Start: 2020-10-25 | End: 2020-10-26 | Stop reason: HOSPADM

## 2020-10-24 RX ADMIN — INSULIN GLARGINE 10 UNITS: 100 INJECTION, SOLUTION SUBCUTANEOUS at 08:35

## 2020-10-24 RX ADMIN — INSULIN LISPRO 5 UNITS: 100 INJECTION, SOLUTION INTRAVENOUS; SUBCUTANEOUS at 12:58

## 2020-10-24 RX ADMIN — HEPARIN SODIUM: 1000 INJECTION INTRAVENOUS; SUBCUTANEOUS at 21:21

## 2020-10-24 RX ADMIN — LABETALOL HYDROCHLORIDE 300 MG: 100 TABLET, FILM COATED ORAL at 21:43

## 2020-10-24 RX ADMIN — INSULIN HUMAN 4 UNITS: 100 INJECTION, SUSPENSION SUBCUTANEOUS at 21:41

## 2020-10-24 RX ADMIN — SEVELAMER HYDROCHLORIDE 1600 MG: 800 TABLET, FILM COATED PARENTERAL at 17:28

## 2020-10-24 RX ADMIN — TORSEMIDE 100 MG: 100 TABLET ORAL at 21:45

## 2020-10-24 RX ADMIN — INSULIN LISPRO 1 UNITS: 100 INJECTION, SOLUTION INTRAVENOUS; SUBCUTANEOUS at 21:34

## 2020-10-24 RX ADMIN — SEVELAMER HYDROCHLORIDE 1600 MG: 800 TABLET, FILM COATED PARENTERAL at 12:56

## 2020-10-24 RX ADMIN — HEPARIN SODIUM 5000 UNITS: 5000 INJECTION INTRAVENOUS; SUBCUTANEOUS at 05:42

## 2020-10-24 RX ADMIN — FAMOTIDINE 40 MG: 20 TABLET ORAL at 17:28

## 2020-10-24 RX ADMIN — HYDRALAZINE HYDROCHLORIDE 50 MG: 25 TABLET, FILM COATED ORAL at 21:45

## 2020-10-24 RX ADMIN — ESCITALOPRAM OXALATE 10 MG: 10 TABLET ORAL at 08:35

## 2020-10-24 RX ADMIN — ATORVASTATIN CALCIUM 40 MG: 40 TABLET, FILM COATED ORAL at 17:28

## 2020-10-24 RX ADMIN — INSULIN LISPRO 5 UNITS: 100 INJECTION, SOLUTION INTRAVENOUS; SUBCUTANEOUS at 10:06

## 2020-10-24 RX ADMIN — LABETALOL HYDROCHLORIDE 300 MG: 100 TABLET, FILM COATED ORAL at 08:33

## 2020-10-24 RX ADMIN — NIFEDIPINE 60 MG: 60 TABLET, FILM COATED, EXTENDED RELEASE ORAL at 21:46

## 2020-10-24 RX ADMIN — GABAPENTIN 100 MG: 100 CAPSULE ORAL at 21:45

## 2020-10-24 RX ADMIN — DOXAZOSIN 2 MG: 1 TABLET ORAL at 21:44

## 2020-10-24 RX ADMIN — METOLAZONE 10 MG: 2.5 TABLET ORAL at 12:57

## 2020-10-24 RX ADMIN — SEVELAMER HYDROCHLORIDE 1600 MG: 800 TABLET, FILM COATED PARENTERAL at 08:33

## 2020-10-24 RX ADMIN — LISINOPRIL 20 MG: 20 TABLET ORAL at 08:35

## 2020-10-24 RX ADMIN — INSULIN LISPRO 1 UNITS: 100 INJECTION, SOLUTION INTRAVENOUS; SUBCUTANEOUS at 12:58

## 2020-10-24 RX ADMIN — INSULIN LISPRO 3 UNITS: 100 INJECTION, SOLUTION INTRAVENOUS; SUBCUTANEOUS at 10:07

## 2020-10-24 RX ADMIN — FAMOTIDINE 40 MG: 20 TABLET ORAL at 08:35

## 2020-10-24 RX ADMIN — TORSEMIDE 100 MG: 100 TABLET ORAL at 13:02

## 2020-10-24 RX ADMIN — ONDANSETRON 4 MG: 2 INJECTION INTRAMUSCULAR; INTRAVENOUS at 08:29

## 2020-10-24 RX ADMIN — HEPARIN SODIUM 5000 UNITS: 5000 INJECTION INTRAVENOUS; SUBCUTANEOUS at 21:30

## 2020-10-24 RX ADMIN — ASPIRIN 81 MG CHEWABLE TABLET 81 MG: 81 TABLET CHEWABLE at 08:33

## 2020-10-24 RX ADMIN — HYDRALAZINE HYDROCHLORIDE 50 MG: 25 TABLET, FILM COATED ORAL at 05:42

## 2020-10-24 RX ADMIN — GENTAMICIN SULFATE: 1 OINTMENT TOPICAL at 21:29

## 2020-10-24 RX ADMIN — HEPARIN SODIUM 5000 UNITS: 5000 INJECTION INTRAVENOUS; SUBCUTANEOUS at 13:02

## 2020-10-24 RX ADMIN — HYDRALAZINE HYDROCHLORIDE 50 MG: 25 TABLET, FILM COATED ORAL at 13:02

## 2020-10-24 RX ADMIN — NIFEDIPINE 60 MG: 60 TABLET, FILM COATED, EXTENDED RELEASE ORAL at 08:36

## 2020-10-25 PROBLEM — I95.1 ORTHOSTATIC HYPOTENSION: Status: ACTIVE | Noted: 2020-10-25

## 2020-10-25 PROBLEM — E87.5 HYPERKALEMIA: Status: RESOLVED | Noted: 2018-02-10 | Resolved: 2020-10-25

## 2020-10-25 LAB
ANION GAP SERPL CALCULATED.3IONS-SCNC: 10 MMOL/L (ref 4–13)
BASOPHILS # BLD AUTO: 0.1 THOUSANDS/ΜL (ref 0–0.1)
BASOPHILS NFR BLD AUTO: 1 % (ref 0–1)
BUN SERPL-MCNC: 36 MG/DL (ref 5–25)
CALCIUM SERPL-MCNC: 9.1 MG/DL (ref 8.3–10.1)
CHLORIDE SERPL-SCNC: 100 MMOL/L (ref 100–108)
CO2 SERPL-SCNC: 28 MMOL/L (ref 21–32)
CREAT SERPL-MCNC: 12.33 MG/DL (ref 0.6–1.3)
EOSINOPHIL # BLD AUTO: 1.14 THOUSAND/ΜL (ref 0–0.61)
EOSINOPHIL NFR BLD AUTO: 14 % (ref 0–6)
ERYTHROCYTE [DISTWIDTH] IN BLOOD BY AUTOMATED COUNT: 14.5 % (ref 11.6–15.1)
GFR SERPL CREATININE-BSD FRML MDRD: 5 ML/MIN/1.73SQ M
GLUCOSE SERPL-MCNC: 200 MG/DL (ref 65–140)
GLUCOSE SERPL-MCNC: 207 MG/DL (ref 65–140)
GLUCOSE SERPL-MCNC: 219 MG/DL (ref 65–140)
GLUCOSE SERPL-MCNC: 252 MG/DL (ref 65–140)
GLUCOSE SERPL-MCNC: 69 MG/DL (ref 65–140)
HCT VFR BLD AUTO: 27.8 % (ref 36.5–49.3)
HGB BLD-MCNC: 9.3 G/DL (ref 12–17)
IMM GRANULOCYTES # BLD AUTO: 0.04 THOUSAND/UL (ref 0–0.2)
IMM GRANULOCYTES NFR BLD AUTO: 1 % (ref 0–2)
LYMPHOCYTES # BLD AUTO: 1.04 THOUSANDS/ΜL (ref 0.6–4.47)
LYMPHOCYTES NFR BLD AUTO: 13 % (ref 14–44)
MCH RBC QN AUTO: 31.5 PG (ref 26.8–34.3)
MCHC RBC AUTO-ENTMCNC: 33.5 G/DL (ref 31.4–37.4)
MCV RBC AUTO: 94 FL (ref 82–98)
MONOCYTES # BLD AUTO: 0.64 THOUSAND/ΜL (ref 0.17–1.22)
MONOCYTES NFR BLD AUTO: 8 % (ref 4–12)
NEUTROPHILS # BLD AUTO: 5 THOUSANDS/ΜL (ref 1.85–7.62)
NEUTS SEG NFR BLD AUTO: 63 % (ref 43–75)
NRBC BLD AUTO-RTO: 0 /100 WBCS
PHOSPHATE SERPL-MCNC: 6.9 MG/DL (ref 2.7–4.5)
PLATELET # BLD AUTO: 306 THOUSANDS/UL (ref 149–390)
PMV BLD AUTO: 10.2 FL (ref 8.9–12.7)
POTASSIUM SERPL-SCNC: 4.1 MMOL/L (ref 3.5–5.3)
RBC # BLD AUTO: 2.95 MILLION/UL (ref 3.88–5.62)
SODIUM SERPL-SCNC: 138 MMOL/L (ref 136–145)
WBC # BLD AUTO: 7.96 THOUSAND/UL (ref 4.31–10.16)

## 2020-10-25 PROCEDURE — 99232 SBSQ HOSP IP/OBS MODERATE 35: CPT | Performed by: INTERNAL MEDICINE

## 2020-10-25 PROCEDURE — 82948 REAGENT STRIP/BLOOD GLUCOSE: CPT

## 2020-10-25 PROCEDURE — 85025 COMPLETE CBC W/AUTO DIFF WBC: CPT | Performed by: INTERNAL MEDICINE

## 2020-10-25 PROCEDURE — 84100 ASSAY OF PHOSPHORUS: CPT | Performed by: INTERNAL MEDICINE

## 2020-10-25 PROCEDURE — 80048 BASIC METABOLIC PNL TOTAL CA: CPT | Performed by: INTERNAL MEDICINE

## 2020-10-25 RX ORDER — CLONIDINE HYDROCHLORIDE 0.1 MG/1
0.1 TABLET ORAL 3 TIMES DAILY PRN
Status: DISCONTINUED | OUTPATIENT
Start: 2020-10-25 | End: 2020-10-26 | Stop reason: HOSPADM

## 2020-10-25 RX ADMIN — HEPARIN SODIUM: 1000 INJECTION INTRAVENOUS; SUBCUTANEOUS at 22:20

## 2020-10-25 RX ADMIN — ESCITALOPRAM OXALATE 10 MG: 10 TABLET ORAL at 09:20

## 2020-10-25 RX ADMIN — SEVELAMER HYDROCHLORIDE 1600 MG: 800 TABLET, FILM COATED PARENTERAL at 16:27

## 2020-10-25 RX ADMIN — B-COMPLEX W/ C & FOLIC ACID TAB 1 TABLET: TAB at 12:21

## 2020-10-25 RX ADMIN — HYDRALAZINE HYDROCHLORIDE 50 MG: 25 TABLET, FILM COATED ORAL at 16:27

## 2020-10-25 RX ADMIN — LISINOPRIL 20 MG: 20 TABLET ORAL at 09:19

## 2020-10-25 RX ADMIN — LOPERAMIDE HYDROCHLORIDE 2 MG: 2 CAPSULE ORAL at 09:18

## 2020-10-25 RX ADMIN — FAMOTIDINE 40 MG: 20 TABLET ORAL at 09:19

## 2020-10-25 RX ADMIN — METOLAZONE 10 MG: 2.5 TABLET ORAL at 12:21

## 2020-10-25 RX ADMIN — LABETALOL HYDROCHLORIDE 300 MG: 100 TABLET, FILM COATED ORAL at 21:59

## 2020-10-25 RX ADMIN — ONDANSETRON 4 MG: 2 INJECTION INTRAMUSCULAR; INTRAVENOUS at 22:02

## 2020-10-25 RX ADMIN — TORSEMIDE 100 MG: 100 TABLET ORAL at 12:21

## 2020-10-25 RX ADMIN — GABAPENTIN 100 MG: 100 CAPSULE ORAL at 21:52

## 2020-10-25 RX ADMIN — SEVELAMER HYDROCHLORIDE 1600 MG: 800 TABLET, FILM COATED PARENTERAL at 12:21

## 2020-10-25 RX ADMIN — INSULIN LISPRO 5 UNITS: 100 INJECTION, SOLUTION INTRAVENOUS; SUBCUTANEOUS at 09:23

## 2020-10-25 RX ADMIN — FAMOTIDINE 40 MG: 20 TABLET ORAL at 17:20

## 2020-10-25 RX ADMIN — HYDRALAZINE HYDROCHLORIDE 50 MG: 25 TABLET, FILM COATED ORAL at 06:12

## 2020-10-25 RX ADMIN — INSULIN LISPRO 2 UNITS: 100 INJECTION, SOLUTION INTRAVENOUS; SUBCUTANEOUS at 17:44

## 2020-10-25 RX ADMIN — ASPIRIN 81 MG CHEWABLE TABLET 81 MG: 81 TABLET CHEWABLE at 09:18

## 2020-10-25 RX ADMIN — NIFEDIPINE 60 MG: 60 TABLET, FILM COATED, EXTENDED RELEASE ORAL at 09:20

## 2020-10-25 RX ADMIN — ATORVASTATIN CALCIUM 40 MG: 40 TABLET, FILM COATED ORAL at 17:20

## 2020-10-25 RX ADMIN — LOPERAMIDE HYDROCHLORIDE 2 MG: 2 CAPSULE ORAL at 22:01

## 2020-10-25 RX ADMIN — HYDRALAZINE HYDROCHLORIDE 50 MG: 25 TABLET, FILM COATED ORAL at 21:53

## 2020-10-25 RX ADMIN — NIFEDIPINE 60 MG: 60 TABLET, FILM COATED, EXTENDED RELEASE ORAL at 22:00

## 2020-10-25 RX ADMIN — INSULIN HUMAN 4 UNITS: 100 INJECTION, SUSPENSION SUBCUTANEOUS at 21:58

## 2020-10-25 RX ADMIN — DOXAZOSIN 2 MG: 1 TABLET ORAL at 21:51

## 2020-10-25 RX ADMIN — INSULIN LISPRO 2 UNITS: 100 INJECTION, SOLUTION INTRAVENOUS; SUBCUTANEOUS at 21:57

## 2020-10-25 RX ADMIN — GENTAMICIN SULFATE 1 APPLICATION: 1 OINTMENT TOPICAL at 21:52

## 2020-10-25 RX ADMIN — HEPARIN SODIUM 5000 UNITS: 5000 INJECTION INTRAVENOUS; SUBCUTANEOUS at 16:27

## 2020-10-25 RX ADMIN — HEPARIN SODIUM 5000 UNITS: 5000 INJECTION INTRAVENOUS; SUBCUTANEOUS at 21:54

## 2020-10-25 RX ADMIN — CINACALCET 60 MG: 30 TABLET, FILM COATED ORAL at 09:20

## 2020-10-25 RX ADMIN — LABETALOL HYDROCHLORIDE 300 MG: 100 TABLET, FILM COATED ORAL at 09:19

## 2020-10-25 RX ADMIN — TORSEMIDE 100 MG: 100 TABLET ORAL at 22:00

## 2020-10-25 RX ADMIN — INSULIN LISPRO 5 UNITS: 100 INJECTION, SOLUTION INTRAVENOUS; SUBCUTANEOUS at 17:43

## 2020-10-25 RX ADMIN — HEPARIN SODIUM 5000 UNITS: 5000 INJECTION INTRAVENOUS; SUBCUTANEOUS at 06:12

## 2020-10-25 RX ADMIN — INSULIN LISPRO 3 UNITS: 100 INJECTION, SOLUTION INTRAVENOUS; SUBCUTANEOUS at 09:24

## 2020-10-25 RX ADMIN — ONDANSETRON 4 MG: 2 INJECTION INTRAMUSCULAR; INTRAVENOUS at 06:21

## 2020-10-25 RX ADMIN — SEVELAMER HYDROCHLORIDE 1600 MG: 800 TABLET, FILM COATED PARENTERAL at 09:19

## 2020-10-26 ENCOUNTER — TELEPHONE (OUTPATIENT)
Dept: GASTROENTEROLOGY | Facility: AMBULARY SURGERY CENTER | Age: 35
End: 2020-10-26

## 2020-10-26 VITALS
HEART RATE: 89 BPM | OXYGEN SATURATION: 98 % | BODY MASS INDEX: 31.33 KG/M2 | TEMPERATURE: 98.9 F | SYSTOLIC BLOOD PRESSURE: 126 MMHG | DIASTOLIC BLOOD PRESSURE: 81 MMHG | WEIGHT: 223.8 LBS | HEIGHT: 71 IN | RESPIRATION RATE: 18 BRPM

## 2020-10-26 LAB
ANION GAP SERPL CALCULATED.3IONS-SCNC: 13 MMOL/L (ref 4–13)
BUN SERPL-MCNC: 37 MG/DL (ref 5–25)
CALCIUM SERPL-MCNC: 8.4 MG/DL (ref 8.3–10.1)
CHLORIDE SERPL-SCNC: 98 MMOL/L (ref 100–108)
CO2 SERPL-SCNC: 24 MMOL/L (ref 21–32)
CREAT SERPL-MCNC: 12.79 MG/DL (ref 0.6–1.3)
ERYTHROCYTE [DISTWIDTH] IN BLOOD BY AUTOMATED COUNT: 14.2 % (ref 11.6–15.1)
GFR SERPL CREATININE-BSD FRML MDRD: 4 ML/MIN/1.73SQ M
GLUCOSE SERPL-MCNC: 297 MG/DL (ref 65–140)
GLUCOSE SERPL-MCNC: 333 MG/DL (ref 65–140)
GLUCOSE SERPL-MCNC: 82 MG/DL (ref 65–140)
HCT VFR BLD AUTO: 27.9 % (ref 36.5–49.3)
HGB BLD-MCNC: 9 G/DL (ref 12–17)
MCH RBC QN AUTO: 30.8 PG (ref 26.8–34.3)
MCHC RBC AUTO-ENTMCNC: 32.3 G/DL (ref 31.4–37.4)
MCV RBC AUTO: 96 FL (ref 82–98)
PHOSPHATE SERPL-MCNC: 6.9 MG/DL (ref 2.7–4.5)
PLATELET # BLD AUTO: 297 THOUSANDS/UL (ref 149–390)
PMV BLD AUTO: 10.3 FL (ref 8.9–12.7)
POTASSIUM SERPL-SCNC: 4.1 MMOL/L (ref 3.5–5.3)
RBC # BLD AUTO: 2.92 MILLION/UL (ref 3.88–5.62)
SODIUM SERPL-SCNC: 135 MMOL/L (ref 136–145)
WBC # BLD AUTO: 6.67 THOUSAND/UL (ref 4.31–10.16)

## 2020-10-26 PROCEDURE — 82948 REAGENT STRIP/BLOOD GLUCOSE: CPT

## 2020-10-26 PROCEDURE — 99239 HOSP IP/OBS DSCHRG MGMT >30: CPT | Performed by: INTERNAL MEDICINE

## 2020-10-26 PROCEDURE — 85027 COMPLETE CBC AUTOMATED: CPT | Performed by: INTERNAL MEDICINE

## 2020-10-26 PROCEDURE — 80048 BASIC METABOLIC PNL TOTAL CA: CPT | Performed by: INTERNAL MEDICINE

## 2020-10-26 PROCEDURE — 99233 SBSQ HOSP IP/OBS HIGH 50: CPT | Performed by: INTERNAL MEDICINE

## 2020-10-26 PROCEDURE — 84100 ASSAY OF PHOSPHORUS: CPT | Performed by: INTERNAL MEDICINE

## 2020-10-26 PROCEDURE — 99232 SBSQ HOSP IP/OBS MODERATE 35: CPT | Performed by: INTERNAL MEDICINE

## 2020-10-26 RX ORDER — DICYCLOMINE HCL 20 MG
20 TABLET ORAL 3 TIMES DAILY PRN
Qty: 20 TABLET | Refills: 0 | Status: SHIPPED | OUTPATIENT
Start: 2020-10-26 | End: 2021-02-16

## 2020-10-26 RX ORDER — SACCHAROMYCES BOULARDII 250 MG
250 CAPSULE ORAL 2 TIMES DAILY
Qty: 60 CAPSULE | Refills: 0 | Status: SHIPPED | OUTPATIENT
Start: 2020-10-26 | End: 2022-05-12

## 2020-10-26 RX ORDER — LOPERAMIDE HYDROCHLORIDE 2 MG/1
2 CAPSULE ORAL 3 TIMES DAILY PRN
Qty: 30 CAPSULE | Refills: 0 | Status: SHIPPED | OUTPATIENT
Start: 2020-10-26 | End: 2021-08-16

## 2020-10-26 RX ORDER — INSULIN GLARGINE 100 [IU]/ML
6 INJECTION, SOLUTION SUBCUTANEOUS
Refills: 0
Start: 2020-10-26 | End: 2020-11-25 | Stop reason: HOSPADM

## 2020-10-26 RX ORDER — LISINOPRIL 20 MG/1
20 TABLET ORAL DAILY
Qty: 30 TABLET | Refills: 0 | Status: SHIPPED | OUTPATIENT
Start: 2020-10-27 | End: 2022-05-12

## 2020-10-26 RX ADMIN — B-COMPLEX W/ C & FOLIC ACID TAB 1 TABLET: TAB at 12:26

## 2020-10-26 RX ADMIN — ESCITALOPRAM OXALATE 10 MG: 10 TABLET ORAL at 09:13

## 2020-10-26 RX ADMIN — SEVELAMER HYDROCHLORIDE 1600 MG: 800 TABLET, FILM COATED PARENTERAL at 09:13

## 2020-10-26 RX ADMIN — NIFEDIPINE 60 MG: 60 TABLET, FILM COATED, EXTENDED RELEASE ORAL at 09:14

## 2020-10-26 RX ADMIN — METOLAZONE 10 MG: 2.5 TABLET ORAL at 12:27

## 2020-10-26 RX ADMIN — SEVELAMER HYDROCHLORIDE 1600 MG: 800 TABLET, FILM COATED PARENTERAL at 12:27

## 2020-10-26 RX ADMIN — FAMOTIDINE 40 MG: 20 TABLET ORAL at 09:13

## 2020-10-26 RX ADMIN — LABETALOL HYDROCHLORIDE 300 MG: 100 TABLET, FILM COATED ORAL at 09:13

## 2020-10-26 RX ADMIN — HYDRALAZINE HYDROCHLORIDE 50 MG: 25 TABLET, FILM COATED ORAL at 05:42

## 2020-10-26 RX ADMIN — LISINOPRIL 20 MG: 20 TABLET ORAL at 09:13

## 2020-10-26 RX ADMIN — HEPARIN SODIUM 5000 UNITS: 5000 INJECTION INTRAVENOUS; SUBCUTANEOUS at 05:42

## 2020-10-26 RX ADMIN — INSULIN LISPRO 4 UNITS: 100 INJECTION, SOLUTION INTRAVENOUS; SUBCUTANEOUS at 09:15

## 2020-10-26 RX ADMIN — INSULIN LISPRO 5 UNITS: 100 INJECTION, SOLUTION INTRAVENOUS; SUBCUTANEOUS at 09:15

## 2020-10-26 RX ADMIN — ASPIRIN 81 MG CHEWABLE TABLET 81 MG: 81 TABLET CHEWABLE at 09:13

## 2020-10-26 RX ADMIN — TORSEMIDE 100 MG: 100 TABLET ORAL at 12:27

## 2020-10-27 LAB
BACTERIA SPEC BFLD CULT: NO GROWTH
ELASTASE PANC STL-MCNT: 360 UG ELAST./G
GRAM STN SPEC: NORMAL
GRAM STN SPEC: NORMAL
MISCELLANEOUS LAB TEST RESULT: NORMAL
MISCELLANEOUS LAB TEST RESULT: NORMAL

## 2020-10-28 ENCOUNTER — PATIENT OUTREACH (OUTPATIENT)
Dept: INTERNAL MEDICINE CLINIC | Facility: CLINIC | Age: 35
End: 2020-10-28

## 2020-10-30 ENCOUNTER — TELEPHONE (OUTPATIENT)
Dept: VASCULAR SURGERY | Facility: CLINIC | Age: 35
End: 2020-10-30

## 2020-10-30 ENCOUNTER — TRANSCRIBE ORDERS (OUTPATIENT)
Dept: VASCULAR SURGERY | Facility: CLINIC | Age: 35
End: 2020-10-30

## 2020-10-30 ENCOUNTER — OFFICE VISIT (OUTPATIENT)
Dept: NEUROLOGY | Facility: CLINIC | Age: 35
End: 2020-10-30
Payer: MEDICARE

## 2020-10-30 VITALS
HEART RATE: 93 BPM | HEIGHT: 71 IN | DIASTOLIC BLOOD PRESSURE: 87 MMHG | TEMPERATURE: 97.9 F | WEIGHT: 223 LBS | BODY MASS INDEX: 31.22 KG/M2 | SYSTOLIC BLOOD PRESSURE: 146 MMHG

## 2020-10-30 DIAGNOSIS — Z99.2 END-STAGE RENAL DISEASE ON PERITONEAL DIALYSIS (HCC): Primary | ICD-10-CM

## 2020-10-30 DIAGNOSIS — N18.6 END-STAGE RENAL DISEASE ON PERITONEAL DIALYSIS (HCC): ICD-10-CM

## 2020-10-30 DIAGNOSIS — R56.9 PROVOKED SEIZURE (HCC): Primary | ICD-10-CM

## 2020-10-30 DIAGNOSIS — N18.6 END-STAGE RENAL DISEASE (HCC): Primary | ICD-10-CM

## 2020-10-30 DIAGNOSIS — Z99.2 END-STAGE RENAL DISEASE ON PERITONEAL DIALYSIS (HCC): ICD-10-CM

## 2020-10-30 DIAGNOSIS — I16.0 HYPERTENSIVE URGENCY: ICD-10-CM

## 2020-10-30 DIAGNOSIS — N18.6 END-STAGE RENAL DISEASE ON PERITONEAL DIALYSIS (HCC): Primary | ICD-10-CM

## 2020-10-30 PROCEDURE — 99214 OFFICE O/P EST MOD 30 MIN: CPT | Performed by: PSYCHIATRY & NEUROLOGY

## 2020-11-02 ENCOUNTER — TRANSITIONAL CARE MANAGEMENT (OUTPATIENT)
Dept: INTERNAL MEDICINE CLINIC | Facility: CLINIC | Age: 35
End: 2020-11-02

## 2020-11-04 ENCOUNTER — OFFICE VISIT (OUTPATIENT)
Dept: INTERNAL MEDICINE CLINIC | Facility: CLINIC | Age: 35
End: 2020-11-04
Payer: MEDICARE

## 2020-11-04 VITALS
TEMPERATURE: 98.5 F | WEIGHT: 230.6 LBS | OXYGEN SATURATION: 99 % | SYSTOLIC BLOOD PRESSURE: 136 MMHG | RESPIRATION RATE: 16 BRPM | BODY MASS INDEX: 32.28 KG/M2 | HEIGHT: 71 IN | HEART RATE: 87 BPM | DIASTOLIC BLOOD PRESSURE: 80 MMHG

## 2020-11-04 DIAGNOSIS — D72.19 EOSINOPHILIC LEUKOCYTOSIS, UNSPECIFIED TYPE: ICD-10-CM

## 2020-11-04 DIAGNOSIS — N18.6 END-STAGE RENAL DISEASE ON PERITONEAL DIALYSIS (HCC): ICD-10-CM

## 2020-11-04 DIAGNOSIS — R10.12 LUQ ABDOMINAL PAIN: Primary | ICD-10-CM

## 2020-11-04 DIAGNOSIS — J30.89 ENVIRONMENTAL AND SEASONAL ALLERGIES: ICD-10-CM

## 2020-11-04 DIAGNOSIS — Z99.2 END-STAGE RENAL DISEASE ON PERITONEAL DIALYSIS (HCC): ICD-10-CM

## 2020-11-04 PROBLEM — R60.0 BILATERAL LEG EDEMA: Status: RESOLVED | Noted: 2018-02-19 | Resolved: 2020-11-04

## 2020-11-04 PROCEDURE — 99495 TRANSJ CARE MGMT MOD F2F 14D: CPT | Performed by: INTERNAL MEDICINE

## 2020-11-04 RX ORDER — CALCIUM ACETATE 667 MG/1
TABLET ORAL
COMMUNITY
Start: 2020-10-30 | End: 2021-02-16

## 2020-11-05 ENCOUNTER — TELEPHONE (OUTPATIENT)
Dept: VASCULAR SURGERY | Facility: CLINIC | Age: 35
End: 2020-11-05

## 2020-11-05 ENCOUNTER — CONSULT (OUTPATIENT)
Dept: VASCULAR SURGERY | Facility: CLINIC | Age: 35
End: 2020-11-05
Payer: MEDICARE

## 2020-11-05 ENCOUNTER — HOSPITAL ENCOUNTER (OUTPATIENT)
Dept: NON INVASIVE DIAGNOSTICS | Facility: CLINIC | Age: 35
Discharge: HOME | End: 2020-11-05
Payer: MEDICARE

## 2020-11-05 ENCOUNTER — PREP FOR PROCEDURE (OUTPATIENT)
Dept: VASCULAR SURGERY | Facility: CLINIC | Age: 35
End: 2020-11-05

## 2020-11-05 VITALS
WEIGHT: 228 LBS | DIASTOLIC BLOOD PRESSURE: 124 MMHG | HEIGHT: 71 IN | SYSTOLIC BLOOD PRESSURE: 190 MMHG | HEART RATE: 78 BPM | BODY MASS INDEX: 31.92 KG/M2 | TEMPERATURE: 97.8 F

## 2020-11-05 DIAGNOSIS — Z99.2 END-STAGE RENAL DISEASE ON PERITONEAL DIALYSIS (HCC): ICD-10-CM

## 2020-11-05 DIAGNOSIS — N18.6 END-STAGE RENAL DISEASE ON PERITONEAL DIALYSIS (HCC): ICD-10-CM

## 2020-11-05 DIAGNOSIS — N18.6 END-STAGE RENAL DISEASE (HCC): ICD-10-CM

## 2020-11-05 PROCEDURE — 93985 DUP-SCAN HEMO COMPL BI STD: CPT | Performed by: SURGERY

## 2020-11-05 PROCEDURE — 93985 DUP-SCAN HEMO COMPL BI STD: CPT

## 2020-11-05 PROCEDURE — 99203 OFFICE O/P NEW LOW 30 MIN: CPT | Performed by: SURGERY

## 2020-11-05 RX ORDER — CHLORHEXIDINE GLUCONATE 0.12 MG/ML
15 RINSE ORAL ONCE
Status: CANCELLED | OUTPATIENT
Start: 2020-11-09 | End: 2020-11-05

## 2020-11-05 RX ORDER — CEFAZOLIN SODIUM 2 G/50ML
2000 SOLUTION INTRAVENOUS ONCE
Status: CANCELLED | OUTPATIENT
Start: 2020-11-09 | End: 2020-11-05

## 2020-11-06 ENCOUNTER — APPOINTMENT (OUTPATIENT)
Dept: LAB | Facility: CLINIC | Age: 35
End: 2020-11-06
Payer: MEDICARE

## 2020-11-06 DIAGNOSIS — N18.6 END-STAGE RENAL DISEASE (HCC): ICD-10-CM

## 2020-11-06 LAB
ANION GAP SERPL CALCULATED.3IONS-SCNC: 10 MMOL/L (ref 4–13)
BUN SERPL-MCNC: 42 MG/DL (ref 5–25)
CALCIUM SERPL-MCNC: 8.4 MG/DL (ref 8.3–10.1)
CHLORIDE SERPL-SCNC: 100 MMOL/L (ref 100–108)
CO2 SERPL-SCNC: 29 MMOL/L (ref 21–32)
CREAT SERPL-MCNC: 11.42 MG/DL (ref 0.6–1.3)
ERYTHROCYTE [DISTWIDTH] IN BLOOD BY AUTOMATED COUNT: 15.9 % (ref 11.6–15.1)
GFR SERPL CREATININE-BSD FRML MDRD: 5 ML/MIN/1.73SQ M
GLUCOSE P FAST SERPL-MCNC: 327 MG/DL (ref 65–99)
HCT VFR BLD AUTO: 30.5 % (ref 36.5–49.3)
HGB BLD-MCNC: 9.7 G/DL (ref 12–17)
MCH RBC QN AUTO: 30.8 PG (ref 26.8–34.3)
MCHC RBC AUTO-ENTMCNC: 31.8 G/DL (ref 31.4–37.4)
MCV RBC AUTO: 97 FL (ref 82–98)
PLATELET # BLD AUTO: 344 THOUSANDS/UL (ref 149–390)
PMV BLD AUTO: 9.9 FL (ref 8.9–12.7)
POTASSIUM SERPL-SCNC: 4.2 MMOL/L (ref 3.5–5.3)
RBC # BLD AUTO: 3.15 MILLION/UL (ref 3.88–5.62)
SODIUM SERPL-SCNC: 139 MMOL/L (ref 136–145)
WBC # BLD AUTO: 6.99 THOUSAND/UL (ref 4.31–10.16)

## 2020-11-06 PROCEDURE — 36415 COLL VENOUS BLD VENIPUNCTURE: CPT

## 2020-11-06 PROCEDURE — 80048 BASIC METABOLIC PNL TOTAL CA: CPT

## 2020-11-06 PROCEDURE — 85027 COMPLETE CBC AUTOMATED: CPT

## 2020-11-09 ENCOUNTER — ANESTHESIA (OUTPATIENT)
Dept: PERIOP | Facility: HOSPITAL | Age: 35
End: 2020-11-09
Payer: MEDICARE

## 2020-11-09 ENCOUNTER — HOSPITAL ENCOUNTER (OUTPATIENT)
Facility: HOSPITAL | Age: 35
Setting detail: OUTPATIENT SURGERY
Discharge: HOME | End: 2020-11-09
Attending: SURGERY | Admitting: SURGERY
Payer: MEDICARE

## 2020-11-09 ENCOUNTER — ANESTHESIA EVENT (OUTPATIENT)
Dept: PERIOP | Facility: HOSPITAL | Age: 35
End: 2020-11-09
Payer: MEDICARE

## 2020-11-09 VITALS
DIASTOLIC BLOOD PRESSURE: 72 MMHG | BODY MASS INDEX: 30.94 KG/M2 | TEMPERATURE: 98.1 F | SYSTOLIC BLOOD PRESSURE: 134 MMHG | HEART RATE: 71 BPM | WEIGHT: 221 LBS | OXYGEN SATURATION: 96 % | HEIGHT: 71 IN | RESPIRATION RATE: 16 BRPM

## 2020-11-09 VITALS — HEART RATE: 76 BPM

## 2020-11-09 DIAGNOSIS — Z99.2 END-STAGE RENAL DISEASE ON PERITONEAL DIALYSIS (HCC): Primary | ICD-10-CM

## 2020-11-09 DIAGNOSIS — N18.6 END-STAGE RENAL DISEASE ON PERITONEAL DIALYSIS (HCC): Primary | ICD-10-CM

## 2020-11-09 LAB
GLUCOSE SERPL-MCNC: 172 MG/DL (ref 65–140)
GLUCOSE SERPL-MCNC: 195 MG/DL (ref 65–140)

## 2020-11-09 PROCEDURE — 36821 AV FUSION DIRECT ANY SITE: CPT | Performed by: SURGERY

## 2020-11-09 PROCEDURE — 82948 REAGENT STRIP/BLOOD GLUCOSE: CPT

## 2020-11-09 RX ORDER — HYDRALAZINE HYDROCHLORIDE 20 MG/ML
INJECTION INTRAMUSCULAR; INTRAVENOUS AS NEEDED
Status: DISCONTINUED | OUTPATIENT
Start: 2020-11-09 | End: 2020-11-09

## 2020-11-09 RX ORDER — OXYCODONE HYDROCHLORIDE AND ACETAMINOPHEN 5; 325 MG/1; MG/1
1 TABLET ORAL EVERY 4 HOURS PRN
Qty: 18 TABLET | Refills: 0 | Status: SHIPPED | OUTPATIENT
Start: 2020-11-09 | End: 2020-11-11 | Stop reason: SDUPTHER

## 2020-11-09 RX ORDER — KETAMINE HCL IN NACL, ISO-OSM 100MG/10ML
SYRINGE (ML) INJECTION AS NEEDED
Status: DISCONTINUED | OUTPATIENT
Start: 2020-11-09 | End: 2020-11-09

## 2020-11-09 RX ORDER — HEPARIN SODIUM 1000 [USP'U]/ML
INJECTION, SOLUTION INTRAVENOUS; SUBCUTANEOUS AS NEEDED
Status: DISCONTINUED | OUTPATIENT
Start: 2020-11-09 | End: 2020-11-09

## 2020-11-09 RX ORDER — MIDAZOLAM HYDROCHLORIDE 2 MG/2ML
INJECTION, SOLUTION INTRAMUSCULAR; INTRAVENOUS AS NEEDED
Status: DISCONTINUED | OUTPATIENT
Start: 2020-11-09 | End: 2020-11-09

## 2020-11-09 RX ORDER — CHLORHEXIDINE GLUCONATE 0.12 MG/ML
15 RINSE ORAL ONCE
Status: COMPLETED | OUTPATIENT
Start: 2020-11-09 | End: 2020-11-09

## 2020-11-09 RX ORDER — LABETALOL 20 MG/4 ML (5 MG/ML) INTRAVENOUS SYRINGE
AS NEEDED
Status: DISCONTINUED | OUTPATIENT
Start: 2020-11-09 | End: 2020-11-09

## 2020-11-09 RX ORDER — BUPIVACAINE HYDROCHLORIDE 5 MG/ML
INJECTION, SOLUTION EPIDURAL; INTRACAUDAL AS NEEDED
Status: DISCONTINUED | OUTPATIENT
Start: 2020-11-09 | End: 2020-11-09 | Stop reason: HOSPADM

## 2020-11-09 RX ORDER — FENTANYL CITRATE 50 UG/ML
INJECTION, SOLUTION INTRAMUSCULAR; INTRAVENOUS AS NEEDED
Status: DISCONTINUED | OUTPATIENT
Start: 2020-11-09 | End: 2020-11-09

## 2020-11-09 RX ORDER — SODIUM CHLORIDE 9 MG/ML
50 INJECTION, SOLUTION INTRAVENOUS CONTINUOUS
Status: DISCONTINUED | OUTPATIENT
Start: 2020-11-09 | End: 2020-11-09 | Stop reason: HOSPADM

## 2020-11-09 RX ORDER — CEFAZOLIN SODIUM 2 G/50ML
2000 SOLUTION INTRAVENOUS ONCE
Status: COMPLETED | OUTPATIENT
Start: 2020-11-09 | End: 2020-11-09

## 2020-11-09 RX ORDER — OXYCODONE HYDROCHLORIDE AND ACETAMINOPHEN 5; 325 MG/1; MG/1
1 TABLET ORAL EVERY 4 HOURS PRN
Status: DISCONTINUED | OUTPATIENT
Start: 2020-11-09 | End: 2020-11-09 | Stop reason: HOSPADM

## 2020-11-09 RX ADMIN — FENTANYL CITRATE 25 MCG: 50 INJECTION, SOLUTION INTRAMUSCULAR; INTRAVENOUS at 10:59

## 2020-11-09 RX ADMIN — Medication 20 MG: at 11:12

## 2020-11-09 RX ADMIN — LABETALOL 20 MG/4 ML (5 MG/ML) INTRAVENOUS SYRINGE 5 MG: at 11:07

## 2020-11-09 RX ADMIN — FENTANYL CITRATE 25 MCG: 50 INJECTION, SOLUTION INTRAMUSCULAR; INTRAVENOUS at 10:42

## 2020-11-09 RX ADMIN — HEPARIN SODIUM 2500 UNITS: 1000 INJECTION INTRAVENOUS; SUBCUTANEOUS at 11:19

## 2020-11-09 RX ADMIN — MIDAZOLAM 2 MG: 1 INJECTION INTRAMUSCULAR; INTRAVENOUS at 10:55

## 2020-11-09 RX ADMIN — CHLORHEXIDINE GLUCONATE 0.12% ORAL RINSE 15 ML: 1.2 LIQUID ORAL at 09:40

## 2020-11-09 RX ADMIN — FENTANYL CITRATE 50 MCG: 50 INJECTION, SOLUTION INTRAMUSCULAR; INTRAVENOUS at 10:53

## 2020-11-09 RX ADMIN — DEXMEDETOMIDINE HYDROCHLORIDE 1 MCG/KG/HR: 100 INJECTION, SOLUTION INTRAVENOUS at 10:40

## 2020-11-09 RX ADMIN — MIDAZOLAM 2 MG: 1 INJECTION INTRAMUSCULAR; INTRAVENOUS at 10:35

## 2020-11-09 RX ADMIN — Medication 10 MG: at 11:02

## 2020-11-09 RX ADMIN — LABETALOL 20 MG/4 ML (5 MG/ML) INTRAVENOUS SYRINGE 5 MG: at 11:41

## 2020-11-09 RX ADMIN — SODIUM CHLORIDE 50 ML/HR: 0.9 INJECTION, SOLUTION INTRAVENOUS at 10:13

## 2020-11-09 RX ADMIN — HYDRALAZINE HYDROCHLORIDE 5 MG: 20 INJECTION INTRAMUSCULAR; INTRAVENOUS at 11:41

## 2020-11-09 RX ADMIN — CEFAZOLIN SODIUM 2000 MG: 2 SOLUTION INTRAVENOUS at 10:28

## 2020-11-09 RX ADMIN — FENTANYL CITRATE 25 MCG: 50 INJECTION, SOLUTION INTRAMUSCULAR; INTRAVENOUS at 10:57

## 2020-11-09 RX ADMIN — HYDRALAZINE HYDROCHLORIDE 15 MG: 20 INJECTION INTRAMUSCULAR; INTRAVENOUS at 11:45

## 2020-11-09 RX ADMIN — LABETALOL 20 MG/4 ML (5 MG/ML) INTRAVENOUS SYRINGE 10 MG: at 11:26

## 2020-11-10 ENCOUNTER — TELEPHONE (OUTPATIENT)
Dept: INTERNAL MEDICINE CLINIC | Facility: CLINIC | Age: 35
End: 2020-11-10

## 2020-11-10 DIAGNOSIS — R80.1 PERSISTENT PROTEINURIA: ICD-10-CM

## 2020-11-10 DIAGNOSIS — R79.89 AZOTEMIA: ICD-10-CM

## 2020-11-10 DIAGNOSIS — N18.30 ACUTE RENAL FAILURE WITH ACUTE TUBULAR NECROSIS SUPERIMPOSED ON STAGE 3 CHRONIC KIDNEY DISEASE (HCC): ICD-10-CM

## 2020-11-10 DIAGNOSIS — N18.30 ACUTE RENAL FAILURE SUPERIMPOSED ON STAGE 3 CHRONIC KIDNEY DISEASE (HCC): ICD-10-CM

## 2020-11-10 DIAGNOSIS — G35 OPTIC NEURITIS DUE TO MULTIPLE SCLEROSIS (HCC): ICD-10-CM

## 2020-11-10 DIAGNOSIS — I63.9 CEREBROVASCULAR ACCIDENT (CVA) OF LEFT PONTINE STRUCTURE (HCC): ICD-10-CM

## 2020-11-10 DIAGNOSIS — R11.0 NAUSEA: ICD-10-CM

## 2020-11-10 DIAGNOSIS — N18.2 ACUTE RENAL FAILURE WITH ACUTE TUBULAR NECROSIS SUPERIMPOSED ON STAGE 2 CHRONIC KIDNEY DISEASE (HCC): ICD-10-CM

## 2020-11-10 DIAGNOSIS — E10.21 TYPE 1 DIABETES MELLITUS WITH DIABETIC NEPHROPATHY, WITH LONG-TERM CURRENT USE OF INSULIN (HCC): ICD-10-CM

## 2020-11-10 DIAGNOSIS — E55.9 VITAMIN D DEFICIENCY: ICD-10-CM

## 2020-11-10 DIAGNOSIS — I16.0 HYPERTENSIVE URGENCY: ICD-10-CM

## 2020-11-10 DIAGNOSIS — N17.9 ACUTE RENAL FAILURE SUPERIMPOSED ON STAGE 3 CHRONIC KIDNEY DISEASE (HCC): ICD-10-CM

## 2020-11-10 DIAGNOSIS — I63.9 CEREBROVASCULAR ACCIDENT (CVA), UNSPECIFIED MECHANISM (HCC): ICD-10-CM

## 2020-11-10 DIAGNOSIS — E72.11 HYPERHOMOCYSTEINEMIA (HCC): ICD-10-CM

## 2020-11-10 DIAGNOSIS — N17.0 ACUTE RENAL FAILURE WITH ACUTE TUBULAR NECROSIS SUPERIMPOSED ON STAGE 3 CHRONIC KIDNEY DISEASE (HCC): ICD-10-CM

## 2020-11-10 DIAGNOSIS — I15.0 RENOVASCULAR HYPERTENSION: Primary | ICD-10-CM

## 2020-11-10 DIAGNOSIS — N17.0 ACUTE RENAL FAILURE WITH ACUTE TUBULAR NECROSIS SUPERIMPOSED ON STAGE 2 CHRONIC KIDNEY DISEASE (HCC): ICD-10-CM

## 2020-11-10 DIAGNOSIS — I10 ESSENTIAL HYPERTENSION: ICD-10-CM

## 2020-11-10 DIAGNOSIS — H53.30 BINOCULAR VISUAL DISTURBANCE: ICD-10-CM

## 2020-11-10 DIAGNOSIS — H46.9 OPTIC NEURITIS DUE TO MULTIPLE SCLEROSIS (HCC): ICD-10-CM

## 2020-11-10 DIAGNOSIS — H53.8 BLURRED VISION: ICD-10-CM

## 2020-11-10 DIAGNOSIS — Z86.73 HISTORY OF LACUNAR CEREBROVASCULAR ACCIDENT (CVA): ICD-10-CM

## 2020-11-10 DIAGNOSIS — E83.39 HYPERPHOSPHATEMIA: ICD-10-CM

## 2020-11-10 DIAGNOSIS — H51.0 BINOCULAR VISION DISORDER WITH CONJUGATE GAZE PALSY: ICD-10-CM

## 2020-11-10 RX ORDER — HYDRALAZINE HYDROCHLORIDE 100 MG/1
50 TABLET, FILM COATED ORAL 2 TIMES DAILY
Qty: 90 TABLET | Refills: 1 | Status: ON HOLD | OUTPATIENT
Start: 2020-11-10 | End: 2020-11-19 | Stop reason: SDUPTHER

## 2020-11-11 ENCOUNTER — TELEPHONE (OUTPATIENT)
Dept: VASCULAR SURGERY | Facility: CLINIC | Age: 35
End: 2020-11-11

## 2020-11-11 DIAGNOSIS — N18.6 END-STAGE RENAL DISEASE ON PERITONEAL DIALYSIS (HCC): ICD-10-CM

## 2020-11-11 DIAGNOSIS — Z99.2 END-STAGE RENAL DISEASE ON PERITONEAL DIALYSIS (HCC): ICD-10-CM

## 2020-11-11 RX ORDER — OXYCODONE HYDROCHLORIDE AND ACETAMINOPHEN 5; 325 MG/1; MG/1
1 TABLET ORAL EVERY 6 HOURS PRN
Qty: 10 TABLET | Refills: 0 | Status: SHIPPED | OUTPATIENT
Start: 2020-11-11 | End: 2020-11-25 | Stop reason: HOSPADM

## 2020-11-12 ENCOUNTER — SOCIAL WORK (OUTPATIENT)
Dept: BEHAVIORAL/MENTAL HEALTH CLINIC | Facility: CLINIC | Age: 35
End: 2020-11-12
Payer: MEDICARE

## 2020-11-12 DIAGNOSIS — F32.2 CURRENT SEVERE EPISODE OF MAJOR DEPRESSIVE DISORDER WITHOUT PSYCHOTIC FEATURES WITHOUT PRIOR EPISODE (HCC): ICD-10-CM

## 2020-11-12 DIAGNOSIS — G47.00 INSOMNIA, UNSPECIFIED TYPE: ICD-10-CM

## 2020-11-12 DIAGNOSIS — F41.1 GAD (GENERALIZED ANXIETY DISORDER): Primary | ICD-10-CM

## 2020-11-12 PROCEDURE — 90791 PSYCH DIAGNOSTIC EVALUATION: CPT | Performed by: SOCIAL WORKER

## 2020-11-13 ENCOUNTER — APPOINTMENT (EMERGENCY)
Dept: CT IMAGING | Facility: HOSPITAL | Age: 35
DRG: 907 | End: 2020-11-13
Payer: MEDICARE

## 2020-11-13 ENCOUNTER — HOSPITAL ENCOUNTER (INPATIENT)
Facility: HOSPITAL | Age: 35
LOS: 12 days | Discharge: HOME | DRG: 907 | End: 2020-11-25
Attending: EMERGENCY MEDICINE | Admitting: INTERNAL MEDICINE
Payer: MEDICARE

## 2020-11-13 DIAGNOSIS — Z99.2 END-STAGE RENAL DISEASE ON PERITONEAL DIALYSIS (HCC): ICD-10-CM

## 2020-11-13 DIAGNOSIS — N18.6 END-STAGE RENAL DISEASE ON PERITONEAL DIALYSIS (HCC): ICD-10-CM

## 2020-11-13 DIAGNOSIS — E10.10: ICD-10-CM

## 2020-11-13 DIAGNOSIS — E10.43 TYPE 1 DIABETES MELLITUS WITH DIABETIC GASTROPATHY (HCC): ICD-10-CM

## 2020-11-13 DIAGNOSIS — K65.9 PERITONITIS (HCC): ICD-10-CM

## 2020-11-13 DIAGNOSIS — I16.0 HYPERTENSIVE URGENCY: ICD-10-CM

## 2020-11-13 DIAGNOSIS — K31.9 TYPE 1 DIABETES MELLITUS WITH DIABETIC GASTROPATHY (HCC): ICD-10-CM

## 2020-11-13 DIAGNOSIS — A41.9 SEPSIS (HCC): Primary | ICD-10-CM

## 2020-11-13 PROBLEM — R10.9 ABDOMINAL PAIN: Status: ACTIVE | Noted: 2020-11-13

## 2020-11-13 LAB
ALBUMIN SERPL BCP-MCNC: 1.9 G/DL (ref 3.5–5)
ALP SERPL-CCNC: 70 U/L (ref 46–116)
ALT SERPL W P-5'-P-CCNC: 9 U/L (ref 12–78)
ANION GAP SERPL CALCULATED.3IONS-SCNC: 12 MMOL/L (ref 4–13)
APPEARANCE FLD: ABNORMAL
APTT PPP: 33 SECONDS (ref 23–37)
AST SERPL W P-5'-P-CCNC: 23 U/L (ref 5–45)
BASOPHILS # BLD AUTO: 0.04 THOUSANDS/ΜL (ref 0–0.1)
BASOPHILS NFR BLD AUTO: 0 % (ref 0–1)
BASOPHILS NFR FLD MANUAL: 1 %
BILIRUB SERPL-MCNC: 0.37 MG/DL (ref 0.2–1)
BUN SERPL-MCNC: 45 MG/DL (ref 5–25)
CALCIUM ALBUM COR SERPL-MCNC: 10.3 MG/DL (ref 8.3–10.1)
CALCIUM SERPL-MCNC: 8.6 MG/DL (ref 8.3–10.1)
CHLORIDE SERPL-SCNC: 97 MMOL/L (ref 100–108)
CO2 SERPL-SCNC: 27 MMOL/L (ref 21–32)
COLOR FLD: ABNORMAL
CREAT SERPL-MCNC: 12.19 MG/DL (ref 0.6–1.3)
EOSINOPHIL # BLD AUTO: 0.31 THOUSAND/ΜL (ref 0–0.61)
EOSINOPHIL NFR BLD AUTO: 2 % (ref 0–6)
EOSINOPHIL NFR FLD MANUAL: 8 %
ERYTHROCYTE [DISTWIDTH] IN BLOOD BY AUTOMATED COUNT: 14.9 % (ref 11.6–15.1)
FLUAV RNA RESP QL NAA+PROBE: NEGATIVE
FLUBV RNA RESP QL NAA+PROBE: NEGATIVE
GFR SERPL CREATININE-BSD FRML MDRD: 5 ML/MIN/1.73SQ M
GLUCOSE SERPL-MCNC: 201 MG/DL (ref 65–140)
HCT VFR BLD AUTO: 33.8 % (ref 36.5–49.3)
HGB BLD-MCNC: 11 G/DL (ref 12–17)
IMM GRANULOCYTES # BLD AUTO: 0.06 THOUSAND/UL (ref 0–0.2)
IMM GRANULOCYTES NFR BLD AUTO: 1 % (ref 0–2)
INR PPP: 1.01 (ref 0.84–1.19)
LACTATE SERPL-SCNC: 0.9 MMOL/L (ref 0.5–2)
LYMPHOCYTES # BLD AUTO: 0.46 THOUSANDS/ΜL (ref 0.6–4.47)
LYMPHOCYTES NFR BLD AUTO: 15 %
LYMPHOCYTES NFR BLD AUTO: 4 % (ref 14–44)
MCH RBC QN AUTO: 30.6 PG (ref 26.8–34.3)
MCHC RBC AUTO-ENTMCNC: 32.5 G/DL (ref 31.4–37.4)
MCV RBC AUTO: 94 FL (ref 82–98)
MONOCYTES # BLD AUTO: 0.39 THOUSAND/ΜL (ref 0.17–1.22)
MONOCYTES NFR BLD AUTO: 3 % (ref 4–12)
MONOCYTES NFR BLD AUTO: 8 %
NEUTROPHILS # BLD AUTO: 11.53 THOUSANDS/ΜL (ref 1.85–7.62)
NEUTS SEG NFR BLD AUTO: 68 %
NEUTS SEG NFR BLD AUTO: 90 % (ref 43–75)
NRBC BLD AUTO-RTO: 0 /100 WBCS
PLATELET # BLD AUTO: 373 THOUSANDS/UL (ref 149–390)
PMV BLD AUTO: 10.2 FL (ref 8.9–12.7)
POTASSIUM SERPL-SCNC: 4.3 MMOL/L (ref 3.5–5.3)
PROT SERPL-MCNC: 5.4 G/DL (ref 6.4–8.2)
PROTHROMBIN TIME: 13.4 SECONDS (ref 11.6–14.5)
RBC # BLD AUTO: 3.59 MILLION/UL (ref 3.88–5.62)
RSV RNA RESP QL NAA+PROBE: NEGATIVE
SARS-COV-2 RNA RESP QL NAA+PROBE: NEGATIVE
SITE: ABNORMAL
SODIUM SERPL-SCNC: 136 MMOL/L (ref 136–145)
TOTAL CELLS COUNTED SPEC: 100
TROPONIN I SERPL-MCNC: 0.03 NG/ML
WBC # BLD AUTO: 12.79 THOUSAND/UL (ref 4.31–10.16)
WBC # FLD MANUAL: 5827 /UL

## 2020-11-13 PROCEDURE — 87070 CULTURE OTHR SPECIMN AEROBIC: CPT | Performed by: EMERGENCY MEDICINE

## 2020-11-13 PROCEDURE — 99223 1ST HOSP IP/OBS HIGH 75: CPT | Performed by: NURSE PRACTITIONER

## 2020-11-13 PROCEDURE — 99285 EMERGENCY DEPT VISIT HI MDM: CPT | Performed by: EMERGENCY MEDICINE

## 2020-11-13 PROCEDURE — 82945 GLUCOSE OTHER FLUID: CPT | Performed by: EMERGENCY MEDICINE

## 2020-11-13 PROCEDURE — 93005 ELECTROCARDIOGRAM TRACING: CPT

## 2020-11-13 PROCEDURE — 96365 THER/PROPH/DIAG IV INF INIT: CPT

## 2020-11-13 PROCEDURE — 84157 ASSAY OF PROTEIN OTHER: CPT | Performed by: EMERGENCY MEDICINE

## 2020-11-13 PROCEDURE — 74176 CT ABD & PELVIS W/O CONTRAST: CPT

## 2020-11-13 PROCEDURE — 82150 ASSAY OF AMYLASE: CPT | Performed by: EMERGENCY MEDICINE

## 2020-11-13 PROCEDURE — 36415 COLL VENOUS BLD VENIPUNCTURE: CPT

## 2020-11-13 PROCEDURE — 87040 BLOOD CULTURE FOR BACTERIA: CPT | Performed by: EMERGENCY MEDICINE

## 2020-11-13 PROCEDURE — 85730 THROMBOPLASTIN TIME PARTIAL: CPT | Performed by: EMERGENCY MEDICINE

## 2020-11-13 PROCEDURE — 99285 EMERGENCY DEPT VISIT HI MDM: CPT

## 2020-11-13 PROCEDURE — 83036 HEMOGLOBIN GLYCOSYLATED A1C: CPT | Performed by: NURSE PRACTITIONER

## 2020-11-13 PROCEDURE — 84484 ASSAY OF TROPONIN QUANT: CPT | Performed by: EMERGENCY MEDICINE

## 2020-11-13 PROCEDURE — 82247 BILIRUBIN TOTAL: CPT | Performed by: EMERGENCY MEDICINE

## 2020-11-13 PROCEDURE — 85610 PROTHROMBIN TIME: CPT | Performed by: EMERGENCY MEDICINE

## 2020-11-13 PROCEDURE — 85025 COMPLETE CBC W/AUTO DIFF WBC: CPT | Performed by: EMERGENCY MEDICINE

## 2020-11-13 PROCEDURE — G1004 CDSM NDSC: HCPCS

## 2020-11-13 PROCEDURE — 96367 TX/PROPH/DG ADDL SEQ IV INF: CPT

## 2020-11-13 PROCEDURE — 83605 ASSAY OF LACTIC ACID: CPT | Performed by: EMERGENCY MEDICINE

## 2020-11-13 PROCEDURE — 83615 LACTATE (LD) (LDH) ENZYME: CPT | Performed by: EMERGENCY MEDICINE

## 2020-11-13 PROCEDURE — 80053 COMPREHEN METABOLIC PANEL: CPT | Performed by: EMERGENCY MEDICINE

## 2020-11-13 PROCEDURE — 84145 PROCALCITONIN (PCT): CPT | Performed by: EMERGENCY MEDICINE

## 2020-11-13 PROCEDURE — 0241U HB NFCT DS VIR RESP RNA 4 TRGT: CPT | Performed by: EMERGENCY MEDICINE

## 2020-11-13 PROCEDURE — 82042 OTHER SOURCE ALBUMIN QUAN EA: CPT | Performed by: EMERGENCY MEDICINE

## 2020-11-13 PROCEDURE — 89051 BODY FLUID CELL COUNT: CPT | Performed by: EMERGENCY MEDICINE

## 2020-11-13 PROCEDURE — 87205 SMEAR GRAM STAIN: CPT | Performed by: EMERGENCY MEDICINE

## 2020-11-13 RX ORDER — DOXAZOSIN MESYLATE 1 MG/1
2 TABLET ORAL DAILY
Status: DISCONTINUED | OUTPATIENT
Start: 2020-11-14 | End: 2020-11-25 | Stop reason: HOSPADM

## 2020-11-13 RX ORDER — DOCUSATE SODIUM 100 MG/1
100 CAPSULE, LIQUID FILLED ORAL 2 TIMES DAILY
Status: DISCONTINUED | OUTPATIENT
Start: 2020-11-14 | End: 2020-11-25 | Stop reason: HOSPADM

## 2020-11-13 RX ORDER — OXYCODONE HYDROCHLORIDE 5 MG/1
5 TABLET ORAL EVERY 4 HOURS PRN
Status: DISCONTINUED | OUTPATIENT
Start: 2020-11-13 | End: 2020-11-25 | Stop reason: HOSPADM

## 2020-11-13 RX ORDER — METOLAZONE 5 MG/1
10 TABLET ORAL DAILY
Status: DISCONTINUED | OUTPATIENT
Start: 2020-11-14 | End: 2020-11-20

## 2020-11-13 RX ORDER — HYDRALAZINE HYDROCHLORIDE 25 MG/1
25 TABLET, FILM COATED ORAL
Status: DISCONTINUED | OUTPATIENT
Start: 2020-11-14 | End: 2020-11-25 | Stop reason: HOSPADM

## 2020-11-13 RX ORDER — NIFEDIPINE 30 MG/1
60 TABLET, EXTENDED RELEASE ORAL 2 TIMES DAILY
Status: DISCONTINUED | OUTPATIENT
Start: 2020-11-14 | End: 2020-11-25 | Stop reason: HOSPADM

## 2020-11-13 RX ORDER — CALCIUM ACETATE 667 MG/1
2001 CAPSULE ORAL
Status: DISCONTINUED | OUTPATIENT
Start: 2020-11-14 | End: 2020-11-17

## 2020-11-13 RX ORDER — OXYCODONE HYDROCHLORIDE 10 MG/1
10 TABLET ORAL EVERY 4 HOURS PRN
Status: DISCONTINUED | OUTPATIENT
Start: 2020-11-13 | End: 2020-11-25 | Stop reason: HOSPADM

## 2020-11-13 RX ORDER — INSULIN GLARGINE 100 [IU]/ML
6 INJECTION, SOLUTION SUBCUTANEOUS
Status: DISCONTINUED | OUTPATIENT
Start: 2020-11-14 | End: 2020-11-19

## 2020-11-13 RX ORDER — SACCHAROMYCES BOULARDII 250 MG
250 CAPSULE ORAL 2 TIMES DAILY
Status: DISCONTINUED | OUTPATIENT
Start: 2020-11-14 | End: 2020-11-25 | Stop reason: HOSPADM

## 2020-11-13 RX ORDER — TORSEMIDE 100 MG/1
100 TABLET ORAL 2 TIMES DAILY
Status: DISCONTINUED | OUTPATIENT
Start: 2020-11-14 | End: 2020-11-20

## 2020-11-13 RX ORDER — HEPARIN SODIUM 5000 [USP'U]/ML
5000 INJECTION, SOLUTION INTRAVENOUS; SUBCUTANEOUS EVERY 8 HOURS SCHEDULED
Status: DISCONTINUED | OUTPATIENT
Start: 2020-11-14 | End: 2020-11-25 | Stop reason: HOSPADM

## 2020-11-13 RX ORDER — HYDROMORPHONE HCL/PF 1 MG/ML
0.5 SYRINGE (ML) INJECTION
Status: DISCONTINUED | OUTPATIENT
Start: 2020-11-13 | End: 2020-11-25 | Stop reason: HOSPADM

## 2020-11-13 RX ORDER — LISINOPRIL 20 MG/1
20 TABLET ORAL DAILY
Status: DISCONTINUED | OUTPATIENT
Start: 2020-11-14 | End: 2020-11-25 | Stop reason: HOSPADM

## 2020-11-13 RX ORDER — GENTAMICIN SULFATE 1 MG/G
1 OINTMENT TOPICAL DAILY
Status: DISCONTINUED | OUTPATIENT
Start: 2020-11-14 | End: 2020-11-19

## 2020-11-13 RX ORDER — LABETALOL 100 MG/1
300 TABLET, FILM COATED ORAL DAILY
Status: DISCONTINUED | OUTPATIENT
Start: 2020-11-14 | End: 2020-11-20

## 2020-11-13 RX ORDER — FAMOTIDINE 20 MG/1
40 TABLET, FILM COATED ORAL DAILY
Status: DISCONTINUED | OUTPATIENT
Start: 2020-11-14 | End: 2020-11-25 | Stop reason: HOSPADM

## 2020-11-13 RX ORDER — ATORVASTATIN CALCIUM 40 MG/1
40 TABLET, FILM COATED ORAL EVERY EVENING
Status: DISCONTINUED | OUTPATIENT
Start: 2020-11-14 | End: 2020-11-25 | Stop reason: HOSPADM

## 2020-11-13 RX ORDER — ESCITALOPRAM OXALATE 10 MG/1
10 TABLET ORAL DAILY
Status: DISCONTINUED | OUTPATIENT
Start: 2020-11-14 | End: 2020-11-25 | Stop reason: HOSPADM

## 2020-11-13 RX ORDER — ACETAMINOPHEN 325 MG/1
650 TABLET ORAL ONCE
Status: COMPLETED | OUTPATIENT
Start: 2020-11-13 | End: 2020-11-13

## 2020-11-13 RX ORDER — ONDANSETRON 2 MG/ML
4 INJECTION INTRAMUSCULAR; INTRAVENOUS EVERY 6 HOURS PRN
Status: DISCONTINUED | OUTPATIENT
Start: 2020-11-13 | End: 2020-11-21

## 2020-11-13 RX ORDER — GENTAMICIN SULFATE 1 MG/G
OINTMENT TOPICAL DAILY
Status: DISCONTINUED | OUTPATIENT
Start: 2020-11-14 | End: 2020-11-16

## 2020-11-13 RX ORDER — CINACALCET 30 MG/1
60 TABLET, FILM COATED ORAL EVERY OTHER DAY
Status: DISCONTINUED | OUTPATIENT
Start: 2020-11-14 | End: 2020-11-25 | Stop reason: HOSPADM

## 2020-11-13 RX ORDER — GABAPENTIN 100 MG/1
100 CAPSULE ORAL
Status: DISCONTINUED | OUTPATIENT
Start: 2020-11-14 | End: 2020-11-25 | Stop reason: HOSPADM

## 2020-11-13 RX ORDER — ACETAMINOPHEN 325 MG/1
650 TABLET ORAL EVERY 6 HOURS PRN
Status: DISCONTINUED | OUTPATIENT
Start: 2020-11-13 | End: 2020-11-25 | Stop reason: HOSPADM

## 2020-11-13 RX ORDER — DICYCLOMINE HCL 20 MG
20 TABLET ORAL 3 TIMES DAILY PRN
Status: DISCONTINUED | OUTPATIENT
Start: 2020-11-13 | End: 2020-11-25 | Stop reason: HOSPADM

## 2020-11-13 RX ORDER — ASPIRIN 81 MG/1
81 TABLET, CHEWABLE ORAL DAILY
Status: DISCONTINUED | OUTPATIENT
Start: 2020-11-14 | End: 2020-11-25 | Stop reason: HOSPADM

## 2020-11-13 RX ADMIN — CEFEPIME HYDROCHLORIDE 1000 MG: 2 INJECTION, POWDER, FOR SOLUTION INTRAVENOUS at 20:57

## 2020-11-13 RX ADMIN — VANCOMYCIN HYDROCHLORIDE 1500 MG: 1 INJECTION, POWDER, LYOPHILIZED, FOR SOLUTION INTRAVENOUS at 21:25

## 2020-11-13 RX ADMIN — ACETAMINOPHEN 650 MG: 325 TABLET, FILM COATED ORAL at 20:53

## 2020-11-14 ENCOUNTER — APPOINTMENT (INPATIENT)
Dept: RADIOLOGY | Facility: HOSPITAL | Age: 35
DRG: 907 | End: 2020-11-14
Payer: MEDICARE

## 2020-11-14 LAB
ALBUMIN FLD-MCNC: <0.6 G/DL
AMYLASE FLD QL: 4 U/L
ANION GAP SERPL CALCULATED.3IONS-SCNC: 11 MMOL/L (ref 4–13)
BACTERIA UR QL AUTO: NORMAL /HPF
BILIRUB FLD-MCNC: 0.15 MG/DL
BILIRUB UR QL STRIP: NEGATIVE
BUN SERPL-MCNC: 48 MG/DL (ref 5–25)
CALCIUM SERPL-MCNC: 8.5 MG/DL (ref 8.3–10.1)
CHLORIDE SERPL-SCNC: 98 MMOL/L (ref 100–108)
CLARITY UR: CLEAR
CO2 SERPL-SCNC: 26 MMOL/L (ref 21–32)
COLOR UR: YELLOW
CREAT SERPL-MCNC: 13.32 MG/DL (ref 0.6–1.3)
ERYTHROCYTE [DISTWIDTH] IN BLOOD BY AUTOMATED COUNT: 14.9 % (ref 11.6–15.1)
EST. AVERAGE GLUCOSE BLD GHB EST-MCNC: 151 MG/DL
GFR SERPL CREATININE-BSD FRML MDRD: 4 ML/MIN/1.73SQ M
GLUCOSE FLD-MCNC: 202 MG/DL
GLUCOSE SERPL-MCNC: 204 MG/DL (ref 65–140)
GLUCOSE SERPL-MCNC: 214 MG/DL (ref 65–140)
GLUCOSE SERPL-MCNC: 230 MG/DL (ref 65–140)
GLUCOSE SERPL-MCNC: 236 MG/DL (ref 65–140)
GLUCOSE SERPL-MCNC: 246 MG/DL (ref 65–140)
GLUCOSE SERPL-MCNC: 278 MG/DL (ref 65–140)
GLUCOSE UR STRIP-MCNC: ABNORMAL MG/DL
HBA1C MFR BLD: 6.9 %
HCT VFR BLD AUTO: 28.5 % (ref 36.5–49.3)
HGB BLD-MCNC: 9.4 G/DL (ref 12–17)
HGB UR QL STRIP.AUTO: ABNORMAL
KETONES UR STRIP-MCNC: NEGATIVE MG/DL
LDH FLD L TO P-CCNC: 181 U/L
LEUKOCYTE ESTERASE UR QL STRIP: NEGATIVE
MCH RBC QN AUTO: 30.9 PG (ref 26.8–34.3)
MCHC RBC AUTO-ENTMCNC: 33 G/DL (ref 31.4–37.4)
MCV RBC AUTO: 94 FL (ref 82–98)
NITRITE UR QL STRIP: NEGATIVE
NON-SQ EPI CELLS URNS QL MICRO: NORMAL /HPF
PH UR STRIP.AUTO: 7.5 [PH]
PLATELET # BLD AUTO: 298 THOUSANDS/UL (ref 149–390)
PMV BLD AUTO: 9.6 FL (ref 8.9–12.7)
POTASSIUM SERPL-SCNC: 4.5 MMOL/L (ref 3.5–5.3)
PROCALCITONIN SERPL-MCNC: 22.44 NG/ML
PROCALCITONIN SERPL-MCNC: 3.36 NG/ML
PROT FLD-MCNC: <2 G/DL
PROT UR STRIP-MCNC: ABNORMAL MG/DL
RBC # BLD AUTO: 3.04 MILLION/UL (ref 3.88–5.62)
RBC #/AREA URNS AUTO: NORMAL /HPF
SODIUM SERPL-SCNC: 135 MMOL/L (ref 136–145)
SP GR UR STRIP.AUTO: 1.01 (ref 1–1.03)
UROBILINOGEN UR QL STRIP.AUTO: 0.2 E.U./DL
VANCOMYCIN SERPL-MCNC: 18.5 UG/ML
WBC # BLD AUTO: 12.19 THOUSAND/UL (ref 4.31–10.16)
WBC #/AREA URNS AUTO: NORMAL /HPF

## 2020-11-14 PROCEDURE — 99232 SBSQ HOSP IP/OBS MODERATE 35: CPT | Performed by: INTERNAL MEDICINE

## 2020-11-14 PROCEDURE — 82948 REAGENT STRIP/BLOOD GLUCOSE: CPT

## 2020-11-14 PROCEDURE — 99255 IP/OBS CONSLTJ NEW/EST HI 80: CPT | Performed by: INTERNAL MEDICINE

## 2020-11-14 PROCEDURE — 84145 PROCALCITONIN (PCT): CPT | Performed by: EMERGENCY MEDICINE

## 2020-11-14 PROCEDURE — 71045 X-RAY EXAM CHEST 1 VIEW: CPT

## 2020-11-14 PROCEDURE — 85027 COMPLETE CBC AUTOMATED: CPT | Performed by: NURSE PRACTITIONER

## 2020-11-14 PROCEDURE — 80202 ASSAY OF VANCOMYCIN: CPT | Performed by: NURSE PRACTITIONER

## 2020-11-14 PROCEDURE — 81001 URINALYSIS AUTO W/SCOPE: CPT | Performed by: INTERNAL MEDICINE

## 2020-11-14 PROCEDURE — 99254 IP/OBS CNSLTJ NEW/EST MOD 60: CPT | Performed by: INTERNAL MEDICINE

## 2020-11-14 PROCEDURE — 80048 BASIC METABOLIC PNL TOTAL CA: CPT | Performed by: NURSE PRACTITIONER

## 2020-11-14 RX ADMIN — LABETALOL HYDROCHLORIDE 300 MG: 100 TABLET ORAL at 08:33

## 2020-11-14 RX ADMIN — CALCIUM ACETATE 2001 MG: 667 CAPSULE ORAL at 16:32

## 2020-11-14 RX ADMIN — HEPARIN SODIUM 5000 UNITS: 5000 INJECTION INTRAVENOUS; SUBCUTANEOUS at 00:59

## 2020-11-14 RX ADMIN — HEPARIN SODIUM: 1000 INJECTION INTRAVENOUS; SUBCUTANEOUS at 11:00

## 2020-11-14 RX ADMIN — HEPARIN SODIUM: 1000 INJECTION INTRAVENOUS; SUBCUTANEOUS at 19:01

## 2020-11-14 RX ADMIN — HEPARIN SODIUM 5000 UNITS: 5000 INJECTION INTRAVENOUS; SUBCUTANEOUS at 16:32

## 2020-11-14 RX ADMIN — HEPARIN SODIUM 5000 UNITS: 5000 INJECTION INTRAVENOUS; SUBCUTANEOUS at 08:31

## 2020-11-14 RX ADMIN — CINACALCET 60 MG: 30 TABLET, FILM COATED ORAL at 08:33

## 2020-11-14 RX ADMIN — NIFEDIPINE 60 MG: 30 TABLET, FILM COATED, EXTENDED RELEASE ORAL at 18:00

## 2020-11-14 RX ADMIN — B-COMPLEX W/ C & FOLIC ACID TAB 1 TABLET: TAB at 12:24

## 2020-11-14 RX ADMIN — Medication 250 MG: at 18:00

## 2020-11-14 RX ADMIN — HYDRALAZINE HYDROCHLORIDE 25 MG: 25 TABLET, FILM COATED ORAL at 00:59

## 2020-11-14 RX ADMIN — INSULIN HUMAN 9 UNITS: 100 INJECTION, SUSPENSION SUBCUTANEOUS at 21:35

## 2020-11-14 RX ADMIN — INSULIN GLARGINE 6 UNITS: 100 INJECTION, SOLUTION SUBCUTANEOUS at 08:36

## 2020-11-14 RX ADMIN — HYDRALAZINE HYDROCHLORIDE 25 MG: 25 TABLET, FILM COATED ORAL at 21:39

## 2020-11-14 RX ADMIN — GENTAMICIN SULFATE 1 APPLICATION: 1 OINTMENT TOPICAL at 11:34

## 2020-11-14 RX ADMIN — GABAPENTIN 100 MG: 100 CAPSULE ORAL at 21:39

## 2020-11-14 RX ADMIN — HEPARIN SODIUM 5000 UNITS: 5000 INJECTION INTRAVENOUS; SUBCUTANEOUS at 21:38

## 2020-11-14 RX ADMIN — ASPIRIN 81 MG CHEWABLE TABLET 81 MG: 81 TABLET CHEWABLE at 08:32

## 2020-11-14 RX ADMIN — Medication 250 MG: at 08:32

## 2020-11-14 RX ADMIN — ONDANSETRON 4 MG: 2 INJECTION INTRAMUSCULAR; INTRAVENOUS at 01:01

## 2020-11-14 RX ADMIN — FAMOTIDINE 40 MG: 20 TABLET ORAL at 08:33

## 2020-11-14 RX ADMIN — ATORVASTATIN CALCIUM 40 MG: 40 TABLET, FILM COATED ORAL at 01:09

## 2020-11-14 RX ADMIN — DOCUSATE SODIUM 100 MG: 100 CAPSULE ORAL at 18:00

## 2020-11-14 RX ADMIN — ACETAMINOPHEN 650 MG: 325 TABLET, FILM COATED ORAL at 01:28

## 2020-11-14 RX ADMIN — TORSEMIDE 100 MG: 100 TABLET ORAL at 18:00

## 2020-11-14 RX ADMIN — NIFEDIPINE 60 MG: 30 TABLET, FILM COATED, EXTENDED RELEASE ORAL at 08:32

## 2020-11-14 RX ADMIN — ESCITALOPRAM OXALATE 10 MG: 10 TABLET ORAL at 08:33

## 2020-11-14 RX ADMIN — ATORVASTATIN CALCIUM 40 MG: 40 TABLET, FILM COATED ORAL at 18:00

## 2020-11-14 RX ADMIN — INSULIN HUMAN 9 UNITS: 100 INJECTION, SUSPENSION SUBCUTANEOUS at 00:59

## 2020-11-14 RX ADMIN — CEFEPIME HYDROCHLORIDE 1000 MG: 2 INJECTION, POWDER, FOR SOLUTION INTRAVENOUS at 08:40

## 2020-11-14 RX ADMIN — INSULIN LISPRO 2 UNITS: 100 INJECTION, SOLUTION INTRAVENOUS; SUBCUTANEOUS at 21:37

## 2020-11-14 RX ADMIN — GENTAMICIN SULFATE 1 APPLICATION: 1 OINTMENT TOPICAL at 11:33

## 2020-11-14 RX ADMIN — METOLAZONE 10 MG: 5 TABLET ORAL at 12:24

## 2020-11-14 RX ADMIN — INSULIN LISPRO 2 UNITS: 100 INJECTION, SOLUTION INTRAVENOUS; SUBCUTANEOUS at 00:59

## 2020-11-14 RX ADMIN — GABAPENTIN 100 MG: 100 CAPSULE ORAL at 01:08

## 2020-11-14 RX ADMIN — DOXAZOSIN 2 MG: 1 TABLET ORAL at 19:51

## 2020-11-14 RX ADMIN — TORSEMIDE 100 MG: 100 TABLET ORAL at 08:33

## 2020-11-14 RX ADMIN — CALCIUM ACETATE 2001 MG: 667 CAPSULE ORAL at 08:31

## 2020-11-14 RX ADMIN — ONDANSETRON 4 MG: 2 INJECTION INTRAMUSCULAR; INTRAVENOUS at 18:51

## 2020-11-14 RX ADMIN — LISINOPRIL 20 MG: 20 TABLET ORAL at 16:32

## 2020-11-15 LAB
ALBUMIN SERPL BCP-MCNC: 1.8 G/DL (ref 3.5–5)
ALP SERPL-CCNC: 68 U/L (ref 46–116)
ALT SERPL W P-5'-P-CCNC: 13 U/L (ref 12–78)
ANION GAP SERPL CALCULATED.3IONS-SCNC: 11 MMOL/L (ref 4–13)
APPEARANCE FLD: ABNORMAL
AST SERPL W P-5'-P-CCNC: 23 U/L (ref 5–45)
BASOPHILS # BLD AUTO: 0.04 THOUSANDS/ΜL (ref 0–0.1)
BASOPHILS NFR BLD AUTO: 1 % (ref 0–1)
BASOPHILS NFR FLD MANUAL: 0 %
BILIRUB SERPL-MCNC: 0.4 MG/DL (ref 0.2–1)
BUN SERPL-MCNC: 57 MG/DL (ref 5–25)
CALCIUM ALBUM COR SERPL-MCNC: 10.9 MG/DL (ref 8.3–10.1)
CALCIUM SERPL-MCNC: 9.1 MG/DL (ref 8.3–10.1)
CHLORIDE SERPL-SCNC: 93 MMOL/L (ref 100–108)
CO2 SERPL-SCNC: 28 MMOL/L (ref 21–32)
COLOR FLD: ABNORMAL
CREAT SERPL-MCNC: 13.02 MG/DL (ref 0.6–1.3)
EOSINOPHIL # BLD AUTO: 1.13 THOUSAND/ΜL (ref 0–0.61)
EOSINOPHIL NFR BLD AUTO: 15 % (ref 0–6)
EOSINOPHIL NFR FLD MANUAL: 6 %
ERYTHROCYTE [DISTWIDTH] IN BLOOD BY AUTOMATED COUNT: 14.5 % (ref 11.6–15.1)
GFR SERPL CREATININE-BSD FRML MDRD: 4 ML/MIN/1.73SQ M
GLUCOSE SERPL-MCNC: 107 MG/DL (ref 65–140)
GLUCOSE SERPL-MCNC: 197 MG/DL (ref 65–140)
GLUCOSE SERPL-MCNC: 199 MG/DL (ref 65–140)
GLUCOSE SERPL-MCNC: 222 MG/DL (ref 65–140)
GLUCOSE SERPL-MCNC: 254 MG/DL (ref 65–140)
HCT VFR BLD AUTO: 30.2 % (ref 36.5–49.3)
HGB BLD-MCNC: 9.9 G/DL (ref 12–17)
IMM GRANULOCYTES # BLD AUTO: 0.03 THOUSAND/UL (ref 0–0.2)
IMM GRANULOCYTES NFR BLD AUTO: 0 % (ref 0–2)
LYMPHOCYTES # BLD AUTO: 0.65 THOUSANDS/ΜL (ref 0.6–4.47)
LYMPHOCYTES NFR BLD AUTO: 4 %
LYMPHOCYTES NFR BLD AUTO: 9 % (ref 14–44)
MCH RBC QN AUTO: 30.7 PG (ref 26.8–34.3)
MCHC RBC AUTO-ENTMCNC: 32.8 G/DL (ref 31.4–37.4)
MCV RBC AUTO: 94 FL (ref 82–98)
MONOCYTES # BLD AUTO: 0.43 THOUSAND/ΜL (ref 0.17–1.22)
MONOCYTES NFR BLD AUTO: 28 %
MONOCYTES NFR BLD AUTO: 6 % (ref 4–12)
NEUTROPHILS # BLD AUTO: 5.16 THOUSANDS/ΜL (ref 1.85–7.62)
NEUTS SEG NFR BLD AUTO: 62 %
NEUTS SEG NFR BLD AUTO: 69 % (ref 43–75)
NRBC BLD AUTO-RTO: 0 /100 WBCS
PLATELET # BLD AUTO: 330 THOUSANDS/UL (ref 149–390)
PMV BLD AUTO: 10 FL (ref 8.9–12.7)
POTASSIUM SERPL-SCNC: 3.9 MMOL/L (ref 3.5–5.3)
PROT SERPL-MCNC: 5.7 G/DL (ref 6.4–8.2)
RBC # BLD AUTO: 3.23 MILLION/UL (ref 3.88–5.62)
SITE: ABNORMAL
SODIUM SERPL-SCNC: 132 MMOL/L (ref 136–145)
TOTAL CELLS COUNTED SPEC: 100
VANCOMYCIN SERPL-MCNC: 27.5 UG/ML
WBC # BLD AUTO: 7.44 THOUSAND/UL (ref 4.31–10.16)
WBC # FLD MANUAL: 1194 /UL

## 2020-11-15 PROCEDURE — 99233 SBSQ HOSP IP/OBS HIGH 50: CPT | Performed by: INTERNAL MEDICINE

## 2020-11-15 PROCEDURE — 87045 FECES CULTURE AEROBIC BACT: CPT | Performed by: INTERNAL MEDICINE

## 2020-11-15 PROCEDURE — 85025 COMPLETE CBC W/AUTO DIFF WBC: CPT | Performed by: INTERNAL MEDICINE

## 2020-11-15 PROCEDURE — 87427 SHIGA-LIKE TOXIN AG IA: CPT

## 2020-11-15 PROCEDURE — 99232 SBSQ HOSP IP/OBS MODERATE 35: CPT | Performed by: INTERNAL MEDICINE

## 2020-11-15 PROCEDURE — 80053 COMPREHEN METABOLIC PANEL: CPT | Performed by: INTERNAL MEDICINE

## 2020-11-15 PROCEDURE — 87070 CULTURE OTHR SPECIMN AEROBIC: CPT | Performed by: INTERNAL MEDICINE

## 2020-11-15 PROCEDURE — 87205 SMEAR GRAM STAIN: CPT | Performed by: INTERNAL MEDICINE

## 2020-11-15 PROCEDURE — 80202 ASSAY OF VANCOMYCIN: CPT | Performed by: EMERGENCY MEDICINE

## 2020-11-15 PROCEDURE — 82948 REAGENT STRIP/BLOOD GLUCOSE: CPT

## 2020-11-15 PROCEDURE — 87046 STOOL CULTR AEROBIC BACT EA: CPT

## 2020-11-15 PROCEDURE — 89051 BODY FLUID CELL COUNT: CPT | Performed by: INTERNAL MEDICINE

## 2020-11-15 RX ADMIN — HEPARIN SODIUM: 1000 INJECTION INTRAVENOUS; SUBCUTANEOUS at 17:46

## 2020-11-15 RX ADMIN — TORSEMIDE 100 MG: 100 TABLET ORAL at 10:08

## 2020-11-15 RX ADMIN — Medication 250 MG: at 17:42

## 2020-11-15 RX ADMIN — DOXAZOSIN 2 MG: 1 TABLET ORAL at 21:59

## 2020-11-15 RX ADMIN — HEPARIN SODIUM 5000 UNITS: 5000 INJECTION INTRAVENOUS; SUBCUTANEOUS at 13:49

## 2020-11-15 RX ADMIN — HEPARIN SODIUM: 1000 INJECTION INTRAVENOUS; SUBCUTANEOUS at 09:57

## 2020-11-15 RX ADMIN — ATORVASTATIN CALCIUM 40 MG: 40 TABLET, FILM COATED ORAL at 17:42

## 2020-11-15 RX ADMIN — INSULIN LISPRO 2 UNITS: 100 INJECTION, SOLUTION INTRAVENOUS; SUBCUTANEOUS at 13:51

## 2020-11-15 RX ADMIN — GABAPENTIN 100 MG: 100 CAPSULE ORAL at 21:59

## 2020-11-15 RX ADMIN — LABETALOL HYDROCHLORIDE 300 MG: 100 TABLET ORAL at 10:09

## 2020-11-15 RX ADMIN — CALCIUM ACETATE 2001 MG: 667 CAPSULE ORAL at 17:41

## 2020-11-15 RX ADMIN — DOCUSATE SODIUM 100 MG: 100 CAPSULE ORAL at 13:48

## 2020-11-15 RX ADMIN — FAMOTIDINE 40 MG: 20 TABLET ORAL at 10:06

## 2020-11-15 RX ADMIN — NIFEDIPINE 60 MG: 30 TABLET, FILM COATED, EXTENDED RELEASE ORAL at 17:42

## 2020-11-15 RX ADMIN — B-COMPLEX W/ C & FOLIC ACID TAB 1 TABLET: TAB at 13:49

## 2020-11-15 RX ADMIN — INSULIN HUMAN 9 UNITS: 100 INJECTION, SUSPENSION SUBCUTANEOUS at 22:01

## 2020-11-15 RX ADMIN — HEPARIN SODIUM 5000 UNITS: 5000 INJECTION INTRAVENOUS; SUBCUTANEOUS at 05:55

## 2020-11-15 RX ADMIN — VANCOMYCIN HYDROCHLORIDE 1250 MG: 5 INJECTION, POWDER, LYOPHILIZED, FOR SOLUTION INTRAVENOUS at 02:06

## 2020-11-15 RX ADMIN — DOCUSATE SODIUM 100 MG: 100 CAPSULE ORAL at 17:42

## 2020-11-15 RX ADMIN — ESCITALOPRAM OXALATE 10 MG: 10 TABLET ORAL at 10:07

## 2020-11-15 RX ADMIN — CALCIUM ACETATE 2001 MG: 667 CAPSULE ORAL at 13:47

## 2020-11-15 RX ADMIN — HEPARIN SODIUM: 1000 INJECTION INTRAVENOUS; SUBCUTANEOUS at 02:21

## 2020-11-15 RX ADMIN — CALCIUM ACETATE 2001 MG: 667 CAPSULE ORAL at 10:08

## 2020-11-15 RX ADMIN — INSULIN LISPRO 3 UNITS: 100 INJECTION, SOLUTION INTRAVENOUS; SUBCUTANEOUS at 13:51

## 2020-11-15 RX ADMIN — ASPIRIN 81 MG CHEWABLE TABLET 81 MG: 81 TABLET CHEWABLE at 10:06

## 2020-11-15 RX ADMIN — Medication 250 MG: at 10:09

## 2020-11-15 RX ADMIN — HYDRALAZINE HYDROCHLORIDE 25 MG: 25 TABLET, FILM COATED ORAL at 21:59

## 2020-11-15 RX ADMIN — HEPARIN SODIUM 5000 UNITS: 5000 INJECTION INTRAVENOUS; SUBCUTANEOUS at 21:58

## 2020-11-15 RX ADMIN — TORSEMIDE 100 MG: 100 TABLET ORAL at 17:42

## 2020-11-15 RX ADMIN — METOLAZONE 10 MG: 5 TABLET ORAL at 13:48

## 2020-11-15 RX ADMIN — GENTAMICIN SULFATE 1 APPLICATION: 1 OINTMENT TOPICAL at 10:11

## 2020-11-15 RX ADMIN — ONDANSETRON 4 MG: 2 INJECTION INTRAMUSCULAR; INTRAVENOUS at 21:58

## 2020-11-15 RX ADMIN — INSULIN GLARGINE 6 UNITS: 100 INJECTION, SOLUTION SUBCUTANEOUS at 10:10

## 2020-11-15 RX ADMIN — ACETAMINOPHEN 650 MG: 325 TABLET, FILM COATED ORAL at 10:05

## 2020-11-15 RX ADMIN — NIFEDIPINE 60 MG: 30 TABLET, FILM COATED, EXTENDED RELEASE ORAL at 10:09

## 2020-11-15 RX ADMIN — DICYCLOMINE HYDROCHLORIDE 20 MG: 20 TABLET ORAL at 16:41

## 2020-11-16 DIAGNOSIS — Z71.89 COMPLEX CARE COORDINATION: Primary | ICD-10-CM

## 2020-11-16 LAB
ALBUMIN SERPL BCP-MCNC: 1.5 G/DL (ref 3.5–5)
ALP SERPL-CCNC: 65 U/L (ref 46–116)
ALT SERPL W P-5'-P-CCNC: 12 U/L (ref 12–78)
ANION GAP SERPL CALCULATED.3IONS-SCNC: 10 MMOL/L (ref 4–13)
AST SERPL W P-5'-P-CCNC: 18 U/L (ref 5–45)
ATRIAL RATE: 120 BPM
BILIRUB SERPL-MCNC: 0.34 MG/DL (ref 0.2–1)
BUN SERPL-MCNC: 42 MG/DL (ref 5–25)
CALCIUM ALBUM COR SERPL-MCNC: 11.1 MG/DL (ref 8.3–10.1)
CALCIUM SERPL-MCNC: 9.1 MG/DL (ref 8.3–10.1)
CHLORIDE SERPL-SCNC: 94 MMOL/L (ref 100–108)
CO2 SERPL-SCNC: 29 MMOL/L (ref 21–32)
CREAT SERPL-MCNC: 13.31 MG/DL (ref 0.6–1.3)
ERYTHROCYTE [DISTWIDTH] IN BLOOD BY AUTOMATED COUNT: 14.1 % (ref 11.6–15.1)
GFR SERPL CREATININE-BSD FRML MDRD: 4 ML/MIN/1.73SQ M
GLUCOSE SERPL-MCNC: 130 MG/DL (ref 65–140)
GLUCOSE SERPL-MCNC: 169 MG/DL (ref 65–140)
GLUCOSE SERPL-MCNC: 212 MG/DL (ref 65–140)
GLUCOSE SERPL-MCNC: 227 MG/DL (ref 65–140)
GLUCOSE SERPL-MCNC: 295 MG/DL (ref 65–140)
HCT VFR BLD AUTO: 26.6 % (ref 36.5–49.3)
HGB BLD-MCNC: 8.9 G/DL (ref 12–17)
MCH RBC QN AUTO: 30.9 PG (ref 26.8–34.3)
MCHC RBC AUTO-ENTMCNC: 33.5 G/DL (ref 31.4–37.4)
MCV RBC AUTO: 92 FL (ref 82–98)
P AXIS: 71 DEGREES
PLATELET # BLD AUTO: 325 THOUSANDS/UL (ref 149–390)
PMV BLD AUTO: 9.7 FL (ref 8.9–12.7)
POTASSIUM SERPL-SCNC: 4.7 MMOL/L (ref 3.5–5.3)
PR INTERVAL: 140 MS
PROT SERPL-MCNC: 4.9 G/DL (ref 6.4–8.2)
QRS AXIS: 77 DEGREES
QRSD INTERVAL: 72 MS
QT INTERVAL: 308 MS
QTC INTERVAL: 435 MS
RBC # BLD AUTO: 2.88 MILLION/UL (ref 3.88–5.62)
SODIUM SERPL-SCNC: 133 MMOL/L (ref 136–145)
T WAVE AXIS: 38 DEGREES
VANCOMYCIN SERPL-MCNC: 25.1 UG/ML
VENTRICULAR RATE: 120 BPM
WBC # BLD AUTO: 5.05 THOUSAND/UL (ref 4.31–10.16)

## 2020-11-16 PROCEDURE — 80202 ASSAY OF VANCOMYCIN: CPT | Performed by: INTERNAL MEDICINE

## 2020-11-16 PROCEDURE — 82948 REAGENT STRIP/BLOOD GLUCOSE: CPT

## 2020-11-16 PROCEDURE — 80053 COMPREHEN METABOLIC PANEL: CPT | Performed by: PHYSICAL MEDICINE & REHABILITATION

## 2020-11-16 PROCEDURE — 93010 ELECTROCARDIOGRAM REPORT: CPT | Performed by: INTERNAL MEDICINE

## 2020-11-16 PROCEDURE — 99232 SBSQ HOSP IP/OBS MODERATE 35: CPT | Performed by: INTERNAL MEDICINE

## 2020-11-16 PROCEDURE — 85027 COMPLETE CBC AUTOMATED: CPT | Performed by: PHYSICAL MEDICINE & REHABILITATION

## 2020-11-16 RX ORDER — METOCLOPRAMIDE HYDROCHLORIDE 5 MG/ML
10 INJECTION INTRAMUSCULAR; INTRAVENOUS EVERY 6 HOURS PRN
Status: DISCONTINUED | OUTPATIENT
Start: 2020-11-16 | End: 2020-11-16

## 2020-11-16 RX ORDER — ERYTHROMYCIN 250 MG/1
250 TABLET, COATED ORAL
Status: DISCONTINUED | OUTPATIENT
Start: 2020-11-16 | End: 2020-11-25 | Stop reason: HOSPADM

## 2020-11-16 RX ORDER — ERYTHROMYCIN 250 MG/1
250 TABLET, COATED ORAL ONCE
Status: DISCONTINUED | OUTPATIENT
Start: 2020-11-16 | End: 2020-11-16

## 2020-11-16 RX ADMIN — CALCIUM ACETATE 2001 MG: 667 CAPSULE ORAL at 12:19

## 2020-11-16 RX ADMIN — HEPARIN SODIUM 5000 UNITS: 5000 INJECTION INTRAVENOUS; SUBCUTANEOUS at 13:47

## 2020-11-16 RX ADMIN — METOLAZONE 10 MG: 5 TABLET ORAL at 12:20

## 2020-11-16 RX ADMIN — ONDANSETRON 4 MG: 2 INJECTION INTRAMUSCULAR; INTRAVENOUS at 18:02

## 2020-11-16 RX ADMIN — DOCUSATE SODIUM 100 MG: 100 CAPSULE ORAL at 18:04

## 2020-11-16 RX ADMIN — TORSEMIDE 100 MG: 100 TABLET ORAL at 18:04

## 2020-11-16 RX ADMIN — ERYTHROMYCIN 250 MG: 250 TABLET, FILM COATED ORAL at 21:39

## 2020-11-16 RX ADMIN — NIFEDIPINE 60 MG: 30 TABLET, FILM COATED, EXTENDED RELEASE ORAL at 12:20

## 2020-11-16 RX ADMIN — NIFEDIPINE 60 MG: 30 TABLET, FILM COATED, EXTENDED RELEASE ORAL at 21:36

## 2020-11-16 RX ADMIN — HYDRALAZINE HYDROCHLORIDE 25 MG: 25 TABLET, FILM COATED ORAL at 21:39

## 2020-11-16 RX ADMIN — HEPARIN SODIUM: 1000 INJECTION INTRAVENOUS; SUBCUTANEOUS at 07:43

## 2020-11-16 RX ADMIN — DOCUSATE SODIUM 100 MG: 100 CAPSULE ORAL at 12:20

## 2020-11-16 RX ADMIN — ESCITALOPRAM OXALATE 10 MG: 10 TABLET ORAL at 12:20

## 2020-11-16 RX ADMIN — INSULIN LISPRO 1 UNITS: 100 INJECTION, SOLUTION INTRAVENOUS; SUBCUTANEOUS at 08:39

## 2020-11-16 RX ADMIN — INSULIN GLARGINE 6 UNITS: 100 INJECTION, SOLUTION SUBCUTANEOUS at 09:08

## 2020-11-16 RX ADMIN — GENTAMICIN SULFATE 1 APPLICATION: 1 OINTMENT TOPICAL at 08:37

## 2020-11-16 RX ADMIN — INSULIN HUMAN 9 UNITS: 100 INJECTION, SUSPENSION SUBCUTANEOUS at 21:39

## 2020-11-16 RX ADMIN — INSULIN LISPRO 2 UNITS: 100 INJECTION, SOLUTION INTRAVENOUS; SUBCUTANEOUS at 12:23

## 2020-11-16 RX ADMIN — HEPARIN SODIUM 5000 UNITS: 5000 INJECTION INTRAVENOUS; SUBCUTANEOUS at 06:50

## 2020-11-16 RX ADMIN — INSULIN LISPRO 1 UNITS: 100 INJECTION, SOLUTION INTRAVENOUS; SUBCUTANEOUS at 12:24

## 2020-11-16 RX ADMIN — DOXAZOSIN 2 MG: 1 TABLET ORAL at 21:38

## 2020-11-16 RX ADMIN — Medication 250 MG: at 18:04

## 2020-11-16 RX ADMIN — ATORVASTATIN CALCIUM 40 MG: 40 TABLET, FILM COATED ORAL at 18:04

## 2020-11-16 RX ADMIN — HEPARIN SODIUM: 1000 INJECTION INTRAVENOUS; SUBCUTANEOUS at 00:56

## 2020-11-16 RX ADMIN — ASPIRIN 81 MG CHEWABLE TABLET 81 MG: 81 TABLET CHEWABLE at 12:20

## 2020-11-16 RX ADMIN — HEPARIN SODIUM 5000 UNITS: 5000 INJECTION INTRAVENOUS; SUBCUTANEOUS at 21:35

## 2020-11-16 RX ADMIN — ERYTHROMYCIN 250 MG: 250 TABLET, FILM COATED ORAL at 18:11

## 2020-11-16 RX ADMIN — CINACALCET 60 MG: 30 TABLET, FILM COATED ORAL at 12:20

## 2020-11-16 RX ADMIN — ERYTHROMYCIN 250 MG: 250 TABLET, FILM COATED ORAL at 12:26

## 2020-11-16 RX ADMIN — ONDANSETRON 4 MG: 2 INJECTION INTRAMUSCULAR; INTRAVENOUS at 06:50

## 2020-11-16 RX ADMIN — LABETALOL HYDROCHLORIDE 300 MG: 100 TABLET ORAL at 12:19

## 2020-11-16 RX ADMIN — LISINOPRIL 20 MG: 20 TABLET ORAL at 18:04

## 2020-11-16 RX ADMIN — DICYCLOMINE HYDROCHLORIDE 20 MG: 20 TABLET ORAL at 18:04

## 2020-11-16 RX ADMIN — INSULIN LISPRO 4 UNITS: 100 INJECTION, SOLUTION INTRAVENOUS; SUBCUTANEOUS at 08:38

## 2020-11-16 RX ADMIN — GABAPENTIN 100 MG: 100 CAPSULE ORAL at 21:38

## 2020-11-17 LAB
ALBUMIN SERPL BCP-MCNC: 1.5 G/DL (ref 3.5–5)
ALP SERPL-CCNC: 61 U/L (ref 46–116)
ALT SERPL W P-5'-P-CCNC: 8 U/L (ref 12–78)
ANION GAP SERPL CALCULATED.3IONS-SCNC: 10 MMOL/L (ref 4–13)
APPEARANCE FLD: CLEAR
AST SERPL W P-5'-P-CCNC: 21 U/L (ref 5–45)
BILIRUB SERPL-MCNC: 0.36 MG/DL (ref 0.2–1)
BUN SERPL-MCNC: 57 MG/DL (ref 5–25)
CALCIUM ALBUM COR SERPL-MCNC: 10.6 MG/DL (ref 8.3–10.1)
CALCIUM SERPL-MCNC: 8.6 MG/DL (ref 8.3–10.1)
CHLORIDE SERPL-SCNC: 95 MMOL/L (ref 100–108)
CO2 SERPL-SCNC: 28 MMOL/L (ref 21–32)
COLOR FLD: COLORLESS
CREAT SERPL-MCNC: 13.29 MG/DL (ref 0.6–1.3)
EOSINOPHIL NFR FLD MANUAL: 10 %
ERYTHROCYTE [DISTWIDTH] IN BLOOD BY AUTOMATED COUNT: 14.3 % (ref 11.6–15.1)
GFR SERPL CREATININE-BSD FRML MDRD: 4 ML/MIN/1.73SQ M
GLUCOSE SERPL-MCNC: 100 MG/DL (ref 65–140)
GLUCOSE SERPL-MCNC: 107 MG/DL (ref 65–140)
GLUCOSE SERPL-MCNC: 194 MG/DL (ref 65–140)
GLUCOSE SERPL-MCNC: 212 MG/DL (ref 65–140)
GLUCOSE SERPL-MCNC: 253 MG/DL (ref 65–140)
GLUCOSE SERPL-MCNC: 297 MG/DL (ref 65–140)
HCT VFR BLD AUTO: 25.3 % (ref 36.5–49.3)
HGB BLD-MCNC: 8.5 G/DL (ref 12–17)
LYMPHOCYTES NFR BLD AUTO: 4 %
MCH RBC QN AUTO: 31 PG (ref 26.8–34.3)
MCHC RBC AUTO-ENTMCNC: 33.6 G/DL (ref 31.4–37.4)
MCV RBC AUTO: 92 FL (ref 82–98)
MONOCYTES NFR BLD AUTO: 2 %
NEUTS SEG NFR BLD AUTO: 84 %
PLATELET # BLD AUTO: 337 THOUSANDS/UL (ref 149–390)
PMV BLD AUTO: 9.9 FL (ref 8.9–12.7)
POTASSIUM SERPL-SCNC: 3.9 MMOL/L (ref 3.5–5.3)
PROT SERPL-MCNC: 4.7 G/DL (ref 6.4–8.2)
RBC # BLD AUTO: 2.74 MILLION/UL (ref 3.88–5.62)
SITE: NORMAL
SODIUM SERPL-SCNC: 133 MMOL/L (ref 136–145)
TOTAL CELLS COUNTED SPEC: 100
VANCOMYCIN SERPL-MCNC: 22.3 UG/ML
WBC # BLD AUTO: 4.72 THOUSAND/UL (ref 4.31–10.16)
WBC # FLD MANUAL: 2979 /UL

## 2020-11-17 PROCEDURE — NC001 PR NO CHARGE: Performed by: RADIOLOGY

## 2020-11-17 PROCEDURE — 80202 ASSAY OF VANCOMYCIN: CPT | Performed by: INTERNAL MEDICINE

## 2020-11-17 PROCEDURE — 89051 BODY FLUID CELL COUNT: CPT | Performed by: PHYSICAL MEDICINE & REHABILITATION

## 2020-11-17 PROCEDURE — 82948 REAGENT STRIP/BLOOD GLUCOSE: CPT

## 2020-11-17 PROCEDURE — 99232 SBSQ HOSP IP/OBS MODERATE 35: CPT | Performed by: INTERNAL MEDICINE

## 2020-11-17 PROCEDURE — 80053 COMPREHEN METABOLIC PANEL: CPT | Performed by: INTERNAL MEDICINE

## 2020-11-17 PROCEDURE — 99252 IP/OBS CONSLTJ NEW/EST SF 35: CPT | Performed by: SURGERY

## 2020-11-17 PROCEDURE — 85027 COMPLETE CBC AUTOMATED: CPT | Performed by: INTERNAL MEDICINE

## 2020-11-17 PROCEDURE — 99233 SBSQ HOSP IP/OBS HIGH 50: CPT | Performed by: INTERNAL MEDICINE

## 2020-11-17 RX ORDER — SEVELAMER HYDROCHLORIDE 800 MG/1
2400 TABLET, FILM COATED ORAL
Status: DISCONTINUED | OUTPATIENT
Start: 2020-11-18 | End: 2020-11-25 | Stop reason: HOSPADM

## 2020-11-17 RX ORDER — DEXTROSE MONOHYDRATE 25 G/50ML
INJECTION, SOLUTION INTRAVENOUS
Status: DISCONTINUED
Start: 2020-11-17 | End: 2020-11-18 | Stop reason: WASHOUT

## 2020-11-17 RX ADMIN — GABAPENTIN 100 MG: 100 CAPSULE ORAL at 21:11

## 2020-11-17 RX ADMIN — OXYCODONE HYDROCHLORIDE 10 MG: 10 TABLET ORAL at 10:47

## 2020-11-17 RX ADMIN — GENTAMICIN SULFATE 1 APPLICATION: 1 OINTMENT TOPICAL at 12:06

## 2020-11-17 RX ADMIN — ERYTHROMYCIN 250 MG: 250 TABLET, FILM COATED ORAL at 17:03

## 2020-11-17 RX ADMIN — Medication 250 MG: at 17:02

## 2020-11-17 RX ADMIN — INSULIN LISPRO 1 UNITS: 100 INJECTION, SOLUTION INTRAVENOUS; SUBCUTANEOUS at 21:19

## 2020-11-17 RX ADMIN — HYDROMORPHONE HYDROCHLORIDE 0.5 MG: 1 INJECTION, SOLUTION INTRAMUSCULAR; INTRAVENOUS; SUBCUTANEOUS at 18:34

## 2020-11-17 RX ADMIN — HYDROMORPHONE HYDROCHLORIDE 0.5 MG: 1 INJECTION, SOLUTION INTRAMUSCULAR; INTRAVENOUS; SUBCUTANEOUS at 11:33

## 2020-11-17 RX ADMIN — DOCUSATE SODIUM 100 MG: 100 CAPSULE ORAL at 17:02

## 2020-11-17 RX ADMIN — LISINOPRIL 20 MG: 20 TABLET ORAL at 17:02

## 2020-11-17 RX ADMIN — NIFEDIPINE 60 MG: 30 TABLET, FILM COATED, EXTENDED RELEASE ORAL at 21:11

## 2020-11-17 RX ADMIN — HEPARIN SODIUM 5000 UNITS: 5000 INJECTION INTRAVENOUS; SUBCUTANEOUS at 05:09

## 2020-11-17 RX ADMIN — HYDRALAZINE HYDROCHLORIDE 25 MG: 25 TABLET, FILM COATED ORAL at 21:12

## 2020-11-17 RX ADMIN — DICYCLOMINE HYDROCHLORIDE 20 MG: 20 TABLET ORAL at 08:52

## 2020-11-17 RX ADMIN — ERYTHROMYCIN 250 MG: 250 TABLET, FILM COATED ORAL at 14:03

## 2020-11-17 RX ADMIN — ERYTHROMYCIN 250 MG: 250 TABLET, FILM COATED ORAL at 21:16

## 2020-11-17 RX ADMIN — ASPIRIN 81 MG CHEWABLE TABLET 81 MG: 81 TABLET CHEWABLE at 11:32

## 2020-11-17 RX ADMIN — OXYCODONE HYDROCHLORIDE 5 MG: 5 TABLET ORAL at 21:10

## 2020-11-17 RX ADMIN — INSULIN HUMAN 9 UNITS: 100 INJECTION, SUSPENSION SUBCUTANEOUS at 21:18

## 2020-11-17 RX ADMIN — HEPARIN SODIUM 5000 UNITS: 5000 INJECTION INTRAVENOUS; SUBCUTANEOUS at 21:11

## 2020-11-17 RX ADMIN — DOCUSATE SODIUM 100 MG: 100 CAPSULE ORAL at 11:32

## 2020-11-17 RX ADMIN — OXYCODONE HYDROCHLORIDE 5 MG: 5 TABLET ORAL at 17:02

## 2020-11-17 RX ADMIN — DICYCLOMINE HYDROCHLORIDE 20 MG: 20 TABLET ORAL at 16:53

## 2020-11-17 RX ADMIN — INSULIN LISPRO 2 UNITS: 100 INJECTION, SOLUTION INTRAVENOUS; SUBCUTANEOUS at 10:52

## 2020-11-17 RX ADMIN — INSULIN LISPRO 3 UNITS: 100 INJECTION, SOLUTION INTRAVENOUS; SUBCUTANEOUS at 10:51

## 2020-11-17 RX ADMIN — ERYTHROMYCIN 250 MG: 250 TABLET, FILM COATED ORAL at 09:00

## 2020-11-17 RX ADMIN — INSULIN GLARGINE 6 UNITS: 100 INJECTION, SOLUTION SUBCUTANEOUS at 09:02

## 2020-11-17 RX ADMIN — DOXAZOSIN 2 MG: 1 TABLET ORAL at 21:11

## 2020-11-17 RX ADMIN — ATORVASTATIN CALCIUM 40 MG: 40 TABLET, FILM COATED ORAL at 16:51

## 2020-11-17 RX ADMIN — NIFEDIPINE 60 MG: 30 TABLET, FILM COATED, EXTENDED RELEASE ORAL at 08:52

## 2020-11-17 RX ADMIN — HEPARIN SODIUM 5000 UNITS: 5000 INJECTION INTRAVENOUS; SUBCUTANEOUS at 14:03

## 2020-11-17 RX ADMIN — TORSEMIDE 100 MG: 100 TABLET ORAL at 16:50

## 2020-11-17 RX ADMIN — TORSEMIDE 100 MG: 100 TABLET ORAL at 11:31

## 2020-11-17 RX ADMIN — LABETALOL HYDROCHLORIDE 300 MG: 100 TABLET ORAL at 08:52

## 2020-11-18 ENCOUNTER — ANESTHESIA (INPATIENT)
Dept: PERIOP | Facility: HOSPITAL | Age: 35
DRG: 907 | End: 2020-11-18
Payer: MEDICARE

## 2020-11-18 ENCOUNTER — APPOINTMENT (INPATIENT)
Dept: RADIOLOGY | Facility: HOSPITAL | Age: 35
DRG: 907 | End: 2020-11-18
Attending: RADIOLOGY
Payer: MEDICARE

## 2020-11-18 ENCOUNTER — ANESTHESIA EVENT (INPATIENT)
Dept: PERIOP | Facility: HOSPITAL | Age: 35
DRG: 907 | End: 2020-11-18
Payer: MEDICARE

## 2020-11-18 VITALS — HEART RATE: 79 BPM

## 2020-11-18 PROBLEM — Z98.890 PONV (POSTOPERATIVE NAUSEA AND VOMITING): Status: ACTIVE | Noted: 2018-01-26

## 2020-11-18 LAB
ANION GAP SERPL CALCULATED.3IONS-SCNC: 10 MMOL/L (ref 4–13)
BACTERIA BLD CULT: NORMAL
BACTERIA BLD CULT: NORMAL
BUN SERPL-MCNC: 61 MG/DL (ref 5–25)
CALCIUM SERPL-MCNC: 8.9 MG/DL (ref 8.3–10.1)
CHLORIDE SERPL-SCNC: 97 MMOL/L (ref 100–108)
CO2 SERPL-SCNC: 28 MMOL/L (ref 21–32)
CREAT SERPL-MCNC: 15.36 MG/DL (ref 0.6–1.3)
ERYTHROCYTE [DISTWIDTH] IN BLOOD BY AUTOMATED COUNT: 14.1 % (ref 11.6–15.1)
GFR SERPL CREATININE-BSD FRML MDRD: 4 ML/MIN/1.73SQ M
GLUCOSE SERPL-MCNC: 138 MG/DL (ref 65–140)
GLUCOSE SERPL-MCNC: 145 MG/DL (ref 65–140)
GLUCOSE SERPL-MCNC: 148 MG/DL (ref 65–140)
GLUCOSE SERPL-MCNC: 160 MG/DL (ref 65–140)
GLUCOSE SERPL-MCNC: 161 MG/DL (ref 65–140)
GLUCOSE SERPL-MCNC: 84 MG/DL (ref 65–140)
GLUCOSE SERPL-MCNC: 86 MG/DL (ref 65–140)
HBV CORE AB SER QL: NORMAL
HBV CORE IGM SER QL: NORMAL
HBV SURFACE AB SER-ACNC: 192.3 MIU/ML
HBV SURFACE AG SER QL: NORMAL
HCT VFR BLD AUTO: 28 % (ref 36.5–49.3)
HCV AB SER QL: NORMAL
HGB BLD-MCNC: 9.2 G/DL (ref 12–17)
MCH RBC QN AUTO: 31 PG (ref 26.8–34.3)
MCHC RBC AUTO-ENTMCNC: 32.9 G/DL (ref 31.4–37.4)
MCV RBC AUTO: 94 FL (ref 82–98)
PLATELET # BLD AUTO: 327 THOUSANDS/UL (ref 149–390)
PMV BLD AUTO: 9.3 FL (ref 8.9–12.7)
POTASSIUM SERPL-SCNC: 4.5 MMOL/L (ref 3.5–5.3)
RBC # BLD AUTO: 2.97 MILLION/UL (ref 3.88–5.62)
SODIUM SERPL-SCNC: 135 MMOL/L (ref 136–145)
WBC # BLD AUTO: 6.76 THOUSAND/UL (ref 4.31–10.16)

## 2020-11-18 PROCEDURE — 86705 HEP B CORE ANTIBODY IGM: CPT | Performed by: INTERNAL MEDICINE

## 2020-11-18 PROCEDURE — 99152 MOD SED SAME PHYS/QHP 5/>YRS: CPT | Performed by: STUDENT IN AN ORGANIZED HEALTH CARE EDUCATION/TRAINING PROGRAM

## 2020-11-18 PROCEDURE — 86706 HEP B SURFACE ANTIBODY: CPT | Performed by: INTERNAL MEDICINE

## 2020-11-18 PROCEDURE — C1750 CATH, HEMODIALYSIS,LONG-TERM: HCPCS

## 2020-11-18 PROCEDURE — 99233 SBSQ HOSP IP/OBS HIGH 50: CPT | Performed by: INTERNAL MEDICINE

## 2020-11-18 PROCEDURE — 86803 HEPATITIS C AB TEST: CPT | Performed by: INTERNAL MEDICINE

## 2020-11-18 PROCEDURE — 80048 BASIC METABOLIC PNL TOTAL CA: CPT | Performed by: INTERNAL MEDICINE

## 2020-11-18 PROCEDURE — 99232 SBSQ HOSP IP/OBS MODERATE 35: CPT | Performed by: INTERNAL MEDICINE

## 2020-11-18 PROCEDURE — 86704 HEP B CORE ANTIBODY TOTAL: CPT | Performed by: INTERNAL MEDICINE

## 2020-11-18 PROCEDURE — 0WPG03Z REMOVAL OF INFUSION DEVICE FROM PERITONEAL CAVITY, OPEN APPROACH: ICD-10-PCS | Performed by: SURGERY

## 2020-11-18 PROCEDURE — 0JH63XZ INSERTION OF TUNNELED VASCULAR ACCESS DEVICE INTO CHEST SUBCUTANEOUS TISSUE AND FASCIA, PERCUTANEOUS APPROACH: ICD-10-PCS | Performed by: STUDENT IN AN ORGANIZED HEALTH CARE EDUCATION/TRAINING PROGRAM

## 2020-11-18 PROCEDURE — 02H633Z INSERTION OF INFUSION DEVICE INTO RIGHT ATRIUM, PERCUTANEOUS APPROACH: ICD-10-PCS | Performed by: STUDENT IN AN ORGANIZED HEALTH CARE EDUCATION/TRAINING PROGRAM

## 2020-11-18 PROCEDURE — 87340 HEPATITIS B SURFACE AG IA: CPT | Performed by: INTERNAL MEDICINE

## 2020-11-18 PROCEDURE — 82948 REAGENT STRIP/BLOOD GLUCOSE: CPT

## 2020-11-18 PROCEDURE — 76937 US GUIDE VASCULAR ACCESS: CPT | Performed by: STUDENT IN AN ORGANIZED HEALTH CARE EDUCATION/TRAINING PROGRAM

## 2020-11-18 PROCEDURE — 49422 REMOVE TUNNELED IP CATH: CPT | Performed by: SURGERY

## 2020-11-18 PROCEDURE — 36558 INSERT TUNNELED CV CATH: CPT | Performed by: STUDENT IN AN ORGANIZED HEALTH CARE EDUCATION/TRAINING PROGRAM

## 2020-11-18 PROCEDURE — 76937 US GUIDE VASCULAR ACCESS: CPT

## 2020-11-18 PROCEDURE — 99232 SBSQ HOSP IP/OBS MODERATE 35: CPT | Performed by: SURGERY

## 2020-11-18 PROCEDURE — 77001 FLUOROGUIDE FOR VEIN DEVICE: CPT

## 2020-11-18 PROCEDURE — 36558 INSERT TUNNELED CV CATH: CPT

## 2020-11-18 PROCEDURE — 85027 COMPLETE CBC AUTOMATED: CPT | Performed by: INTERNAL MEDICINE

## 2020-11-18 PROCEDURE — 77001 FLUOROGUIDE FOR VEIN DEVICE: CPT | Performed by: STUDENT IN AN ORGANIZED HEALTH CARE EDUCATION/TRAINING PROGRAM

## 2020-11-18 PROCEDURE — 99024 POSTOP FOLLOW-UP VISIT: CPT | Performed by: STUDENT IN AN ORGANIZED HEALTH CARE EDUCATION/TRAINING PROGRAM

## 2020-11-18 RX ORDER — DEXTROSE MONOHYDRATE 25 G/50ML
25 INJECTION, SOLUTION INTRAVENOUS ONCE
Status: COMPLETED | OUTPATIENT
Start: 2020-11-18 | End: 2020-11-18

## 2020-11-18 RX ORDER — FENTANYL CITRATE/PF 50 MCG/ML
50 SYRINGE (ML) INJECTION
Status: DISCONTINUED | OUTPATIENT
Start: 2020-11-18 | End: 2020-11-18 | Stop reason: HOSPADM

## 2020-11-18 RX ORDER — MAGNESIUM HYDROXIDE 1200 MG/15ML
LIQUID ORAL AS NEEDED
Status: DISCONTINUED | OUTPATIENT
Start: 2020-11-18 | End: 2020-11-18 | Stop reason: HOSPADM

## 2020-11-18 RX ORDER — SODIUM CHLORIDE, SODIUM LACTATE, POTASSIUM CHLORIDE, CALCIUM CHLORIDE 600; 310; 30; 20 MG/100ML; MG/100ML; MG/100ML; MG/100ML
125 INJECTION, SOLUTION INTRAVENOUS CONTINUOUS
Status: DISCONTINUED | OUTPATIENT
Start: 2020-11-18 | End: 2020-11-18

## 2020-11-18 RX ORDER — BUPIVACAINE HYDROCHLORIDE 2.5 MG/ML
INJECTION, SOLUTION EPIDURAL; INFILTRATION; INTRACAUDAL AS NEEDED
Status: DISCONTINUED | OUTPATIENT
Start: 2020-11-18 | End: 2020-11-18 | Stop reason: HOSPADM

## 2020-11-18 RX ORDER — DEXAMETHASONE SODIUM PHOSPHATE 4 MG/ML
8 INJECTION, SOLUTION INTRA-ARTICULAR; INTRALESIONAL; INTRAMUSCULAR; INTRAVENOUS; SOFT TISSUE ONCE
Status: DISCONTINUED | OUTPATIENT
Start: 2020-11-18 | End: 2020-11-18 | Stop reason: HOSPADM

## 2020-11-18 RX ORDER — MIDAZOLAM HYDROCHLORIDE 2 MG/2ML
INJECTION, SOLUTION INTRAMUSCULAR; INTRAVENOUS CODE/TRAUMA/SEDATION MEDICATION
Status: COMPLETED | OUTPATIENT
Start: 2020-11-18 | End: 2020-11-18

## 2020-11-18 RX ORDER — ONDANSETRON 2 MG/ML
INJECTION INTRAMUSCULAR; INTRAVENOUS AS NEEDED
Status: DISCONTINUED | OUTPATIENT
Start: 2020-11-18 | End: 2020-11-18

## 2020-11-18 RX ORDER — FENTANYL CITRATE 50 UG/ML
INJECTION, SOLUTION INTRAMUSCULAR; INTRAVENOUS AS NEEDED
Status: DISCONTINUED | OUTPATIENT
Start: 2020-11-18 | End: 2020-11-18

## 2020-11-18 RX ORDER — SODIUM CHLORIDE 9 MG/ML
75 INJECTION, SOLUTION INTRAVENOUS CONTINUOUS
Status: DISCONTINUED | OUTPATIENT
Start: 2020-11-18 | End: 2020-11-19

## 2020-11-18 RX ORDER — DEXTROSE 25 % IN WATER 25 %
25 SYRINGE (ML) INTRAVENOUS ONCE
Status: DISCONTINUED | OUTPATIENT
Start: 2020-11-18 | End: 2020-11-22

## 2020-11-18 RX ORDER — CEFAZOLIN SODIUM 2 G/50ML
SOLUTION INTRAVENOUS AS NEEDED
Status: DISCONTINUED | OUTPATIENT
Start: 2020-11-18 | End: 2020-11-18

## 2020-11-18 RX ORDER — HYDROMORPHONE HCL/PF 1 MG/ML
0.5 SYRINGE (ML) INJECTION
Status: DISCONTINUED | OUTPATIENT
Start: 2020-11-18 | End: 2020-11-18 | Stop reason: HOSPADM

## 2020-11-18 RX ORDER — LIDOCAINE HYDROCHLORIDE 10 MG/ML
INJECTION, SOLUTION EPIDURAL; INFILTRATION; INTRACAUDAL; PERINEURAL AS NEEDED
Status: DISCONTINUED | OUTPATIENT
Start: 2020-11-18 | End: 2020-11-18

## 2020-11-18 RX ORDER — DEXTROSE MONOHYDRATE 25 G/50ML
INJECTION, SOLUTION INTRAVENOUS
Status: DISPENSED
Start: 2020-11-18 | End: 2020-11-18

## 2020-11-18 RX ORDER — PROPOFOL 10 MG/ML
INJECTION, EMULSION INTRAVENOUS AS NEEDED
Status: DISCONTINUED | OUTPATIENT
Start: 2020-11-18 | End: 2020-11-18

## 2020-11-18 RX ORDER — FENTANYL CITRATE 50 UG/ML
INJECTION, SOLUTION INTRAMUSCULAR; INTRAVENOUS CODE/TRAUMA/SEDATION MEDICATION
Status: COMPLETED | OUTPATIENT
Start: 2020-11-18 | End: 2020-11-18

## 2020-11-18 RX ADMIN — GABAPENTIN 100 MG: 100 CAPSULE ORAL at 21:25

## 2020-11-18 RX ADMIN — HYDRALAZINE HYDROCHLORIDE 25 MG: 25 TABLET, FILM COATED ORAL at 21:25

## 2020-11-18 RX ADMIN — ONDANSETRON 4 MG: 2 INJECTION INTRAMUSCULAR; INTRAVENOUS at 17:39

## 2020-11-18 RX ADMIN — ACETAMINOPHEN 650 MG: 325 TABLET, FILM COATED ORAL at 21:25

## 2020-11-18 RX ADMIN — MIDAZOLAM HYDROCHLORIDE 0.5 MG: 1 INJECTION, SOLUTION INTRAMUSCULAR; INTRAVENOUS at 10:34

## 2020-11-18 RX ADMIN — DOXAZOSIN 2 MG: 1 TABLET ORAL at 21:25

## 2020-11-18 RX ADMIN — NIFEDIPINE 60 MG: 30 TABLET, FILM COATED, EXTENDED RELEASE ORAL at 21:25

## 2020-11-18 RX ADMIN — NIFEDIPINE 60 MG: 30 TABLET, FILM COATED, EXTENDED RELEASE ORAL at 08:15

## 2020-11-18 RX ADMIN — LIDOCAINE HYDROCHLORIDE 50 MG: 10 INJECTION, SOLUTION EPIDURAL; INFILTRATION; INTRACAUDAL at 17:37

## 2020-11-18 RX ADMIN — SODIUM CHLORIDE: 0.9 INJECTION, SOLUTION INTRAVENOUS at 17:39

## 2020-11-18 RX ADMIN — CEFAZOLIN SODIUM 2000 MG: 2 SOLUTION INTRAVENOUS at 17:22

## 2020-11-18 RX ADMIN — PROPOFOL 200 MG: 10 INJECTION, EMULSION INTRAVENOUS at 17:37

## 2020-11-18 RX ADMIN — DEXTROSE MONOHYDRATE 25 ML: 500 INJECTION PARENTERAL at 08:26

## 2020-11-18 RX ADMIN — INSULIN HUMAN 9 UNITS: 100 INJECTION, SUSPENSION SUBCUTANEOUS at 21:29

## 2020-11-18 RX ADMIN — OXYCODONE HYDROCHLORIDE 10 MG: 10 TABLET ORAL at 14:27

## 2020-11-18 RX ADMIN — MIDAZOLAM HYDROCHLORIDE 0.5 MG: 1 INJECTION, SOLUTION INTRAMUSCULAR; INTRAVENOUS at 10:40

## 2020-11-18 RX ADMIN — DOCUSATE SODIUM 100 MG: 100 CAPSULE ORAL at 08:15

## 2020-11-18 RX ADMIN — ERYTHROMYCIN 250 MG: 250 TABLET, FILM COATED ORAL at 21:28

## 2020-11-18 RX ADMIN — OXYCODONE HYDROCHLORIDE 5 MG: 5 TABLET ORAL at 07:13

## 2020-11-18 RX ADMIN — FENTANYL CITRATE 25 MCG: 50 INJECTION INTRAMUSCULAR; INTRAVENOUS at 10:34

## 2020-11-18 RX ADMIN — OXYCODONE HYDROCHLORIDE 5 MG: 5 TABLET ORAL at 01:22

## 2020-11-18 RX ADMIN — HEPARIN SODIUM 5000 UNITS: 5000 INJECTION INTRAVENOUS; SUBCUTANEOUS at 21:25

## 2020-11-18 RX ADMIN — FENTANYL CITRATE 50 MCG: 50 INJECTION INTRAMUSCULAR; INTRAVENOUS at 17:37

## 2020-11-18 RX ADMIN — LABETALOL HYDROCHLORIDE 300 MG: 100 TABLET ORAL at 08:15

## 2020-11-19 ENCOUNTER — APPOINTMENT (INPATIENT)
Dept: DIALYSIS | Facility: HOSPITAL | Age: 35
DRG: 907 | End: 2020-11-19
Payer: MEDICARE

## 2020-11-19 ENCOUNTER — TELEPHONE (OUTPATIENT)
Dept: INTERNAL MEDICINE CLINIC | Facility: CLINIC | Age: 35
End: 2020-11-19

## 2020-11-19 DIAGNOSIS — I15.0 RENOVASCULAR HYPERTENSION: ICD-10-CM

## 2020-11-19 LAB
ANION GAP SERPL CALCULATED.3IONS-SCNC: 11 MMOL/L (ref 4–13)
BUN SERPL-MCNC: 66 MG/DL (ref 5–25)
CALCIUM SERPL-MCNC: 8.8 MG/DL (ref 8.3–10.1)
CHLORIDE SERPL-SCNC: 98 MMOL/L (ref 100–108)
CO2 SERPL-SCNC: 28 MMOL/L (ref 21–32)
CREAT SERPL-MCNC: 16.97 MG/DL (ref 0.6–1.3)
ERYTHROCYTE [DISTWIDTH] IN BLOOD BY AUTOMATED COUNT: 14 % (ref 11.6–15.1)
FLUAV RNA RESP QL NAA+PROBE: NEGATIVE
FLUBV RNA RESP QL NAA+PROBE: NEGATIVE
GFR SERPL CREATININE-BSD FRML MDRD: 3 ML/MIN/1.73SQ M
GLUCOSE SERPL-MCNC: 128 MG/DL (ref 65–140)
GLUCOSE SERPL-MCNC: 148 MG/DL (ref 65–140)
GLUCOSE SERPL-MCNC: 192 MG/DL (ref 65–140)
GLUCOSE SERPL-MCNC: 62 MG/DL (ref 65–140)
GLUCOSE SERPL-MCNC: 63 MG/DL (ref 65–140)
GLUCOSE SERPL-MCNC: 83 MG/DL (ref 65–140)
HCT VFR BLD AUTO: 26.9 % (ref 36.5–49.3)
HGB BLD-MCNC: 8.7 G/DL (ref 12–17)
MCH RBC QN AUTO: 30.1 PG (ref 26.8–34.3)
MCHC RBC AUTO-ENTMCNC: 32.3 G/DL (ref 31.4–37.4)
MCV RBC AUTO: 93 FL (ref 82–98)
PLATELET # BLD AUTO: 317 THOUSANDS/UL (ref 149–390)
PMV BLD AUTO: 9.4 FL (ref 8.9–12.7)
POTASSIUM SERPL-SCNC: 4 MMOL/L (ref 3.5–5.3)
RBC # BLD AUTO: 2.89 MILLION/UL (ref 3.88–5.62)
RSV RNA RESP QL NAA+PROBE: NEGATIVE
SARS-COV-2 RNA RESP QL NAA+PROBE: NEGATIVE
SODIUM SERPL-SCNC: 137 MMOL/L (ref 136–145)
WBC # BLD AUTO: 6.36 THOUSAND/UL (ref 4.31–10.16)

## 2020-11-19 PROCEDURE — 82948 REAGENT STRIP/BLOOD GLUCOSE: CPT

## 2020-11-19 PROCEDURE — 99232 SBSQ HOSP IP/OBS MODERATE 35: CPT | Performed by: INTERNAL MEDICINE

## 2020-11-19 PROCEDURE — 5A1D70Z PERFORMANCE OF URINARY FILTRATION, INTERMITTENT, LESS THAN 6 HOURS PER DAY: ICD-10-PCS | Performed by: INTERNAL MEDICINE

## 2020-11-19 PROCEDURE — 80048 BASIC METABOLIC PNL TOTAL CA: CPT | Performed by: SURGERY

## 2020-11-19 PROCEDURE — 90935 HEMODIALYSIS ONE EVALUATION: CPT | Performed by: INTERNAL MEDICINE

## 2020-11-19 PROCEDURE — 99232 SBSQ HOSP IP/OBS MODERATE 35: CPT | Performed by: SURGERY

## 2020-11-19 PROCEDURE — 85027 COMPLETE CBC AUTOMATED: CPT | Performed by: SURGERY

## 2020-11-19 PROCEDURE — 0241U HB NFCT DS VIR RESP RNA 4 TRGT: CPT | Performed by: PHYSICAL MEDICINE & REHABILITATION

## 2020-11-19 RX ORDER — HYDRALAZINE HYDROCHLORIDE 100 MG/1
50 TABLET, FILM COATED ORAL 2 TIMES DAILY
Qty: 90 TABLET | Refills: 1 | Status: SHIPPED | OUTPATIENT
Start: 2020-11-19 | End: 2022-07-16

## 2020-11-19 RX ORDER — METOCLOPRAMIDE HYDROCHLORIDE 5 MG/ML
10 INJECTION INTRAMUSCULAR; INTRAVENOUS EVERY 6 HOURS PRN
Status: DISCONTINUED | OUTPATIENT
Start: 2020-11-19 | End: 2020-11-21

## 2020-11-19 RX ORDER — INSULIN GLARGINE 100 [IU]/ML
4 INJECTION, SOLUTION SUBCUTANEOUS
Status: DISCONTINUED | OUTPATIENT
Start: 2020-11-19 | End: 2020-11-20

## 2020-11-19 RX ADMIN — DICYCLOMINE HYDROCHLORIDE 20 MG: 20 TABLET ORAL at 16:36

## 2020-11-19 RX ADMIN — HYDRALAZINE HYDROCHLORIDE 25 MG: 25 TABLET, FILM COATED ORAL at 21:28

## 2020-11-19 RX ADMIN — OXYCODONE HYDROCHLORIDE 5 MG: 5 TABLET ORAL at 21:46

## 2020-11-19 RX ADMIN — HEPARIN SODIUM 5000 UNITS: 5000 INJECTION INTRAVENOUS; SUBCUTANEOUS at 21:28

## 2020-11-19 RX ADMIN — ASPIRIN 81 MG CHEWABLE TABLET 81 MG: 81 TABLET CHEWABLE at 09:25

## 2020-11-19 RX ADMIN — OXYCODONE HYDROCHLORIDE 10 MG: 10 TABLET ORAL at 09:24

## 2020-11-19 RX ADMIN — HYDROMORPHONE HYDROCHLORIDE 0.5 MG: 1 INJECTION, SOLUTION INTRAMUSCULAR; INTRAVENOUS; SUBCUTANEOUS at 23:21

## 2020-11-19 RX ADMIN — NIFEDIPINE 60 MG: 30 TABLET, FILM COATED, EXTENDED RELEASE ORAL at 21:27

## 2020-11-19 RX ADMIN — SEVELAMER HYDROCHLORIDE 2400 MG: 800 TABLET, FILM COATED PARENTERAL at 16:36

## 2020-11-19 RX ADMIN — DICYCLOMINE HYDROCHLORIDE 20 MG: 20 TABLET ORAL at 09:25

## 2020-11-19 RX ADMIN — Medication 250 MG: at 17:37

## 2020-11-19 RX ADMIN — LISINOPRIL 20 MG: 20 TABLET ORAL at 16:36

## 2020-11-19 RX ADMIN — DICYCLOMINE HYDROCHLORIDE 20 MG: 20 TABLET ORAL at 21:28

## 2020-11-19 RX ADMIN — ATORVASTATIN CALCIUM 40 MG: 40 TABLET, FILM COATED ORAL at 17:37

## 2020-11-19 RX ADMIN — ERYTHROMYCIN 250 MG: 250 TABLET, FILM COATED ORAL at 21:30

## 2020-11-19 RX ADMIN — TORSEMIDE 100 MG: 100 TABLET ORAL at 17:37

## 2020-11-19 RX ADMIN — DOXAZOSIN 2 MG: 1 TABLET ORAL at 21:27

## 2020-11-19 RX ADMIN — HEPARIN SODIUM 5000 UNITS: 5000 INJECTION INTRAVENOUS; SUBCUTANEOUS at 05:10

## 2020-11-19 RX ADMIN — HEPARIN SODIUM 5000 UNITS: 5000 INJECTION INTRAVENOUS; SUBCUTANEOUS at 13:04

## 2020-11-19 RX ADMIN — OXYCODONE HYDROCHLORIDE 5 MG: 5 TABLET ORAL at 16:36

## 2020-11-19 RX ADMIN — ERYTHROMYCIN 250 MG: 250 TABLET, FILM COATED ORAL at 16:37

## 2020-11-19 RX ADMIN — GABAPENTIN 100 MG: 100 CAPSULE ORAL at 21:28

## 2020-11-19 RX ADMIN — HYDROMORPHONE HYDROCHLORIDE 0.5 MG: 1 INJECTION, SOLUTION INTRAMUSCULAR; INTRAVENOUS; SUBCUTANEOUS at 12:16

## 2020-11-19 RX ADMIN — ERYTHROMYCIN 250 MG: 250 TABLET, FILM COATED ORAL at 09:26

## 2020-11-19 RX ADMIN — TRIMETHOBENZAMIDE HYDROCHLORIDE 200 MG: 100 INJECTION INTRAMUSCULAR at 21:28

## 2020-11-19 RX ADMIN — DOCUSATE SODIUM 100 MG: 100 CAPSULE ORAL at 09:25

## 2020-11-20 ENCOUNTER — APPOINTMENT (INPATIENT)
Dept: RADIOLOGY | Facility: HOSPITAL | Age: 35
DRG: 907 | End: 2020-11-20
Payer: MEDICARE

## 2020-11-20 ENCOUNTER — TELEPHONE (OUTPATIENT)
Dept: GASTROENTEROLOGY | Facility: AMBULARY SURGERY CENTER | Age: 35
End: 2020-11-20

## 2020-11-20 ENCOUNTER — APPOINTMENT (INPATIENT)
Dept: DIALYSIS | Facility: HOSPITAL | Age: 35
DRG: 907 | End: 2020-11-20
Payer: MEDICARE

## 2020-11-20 ENCOUNTER — APPOINTMENT (INPATIENT)
Dept: CT IMAGING | Facility: HOSPITAL | Age: 35
DRG: 907 | End: 2020-11-20
Payer: MEDICARE

## 2020-11-20 DIAGNOSIS — L29.9 PRURITUS: Primary | ICD-10-CM

## 2020-11-20 PROBLEM — E10.10 TYPE 1 DIABETES MELLITUS WITH KETOACIDOSIS (HCC): Status: ACTIVE | Noted: 2017-06-19

## 2020-11-20 LAB
ANION GAP SERPL CALCULATED.3IONS-SCNC: 16 MMOL/L (ref 4–13)
ANION GAP SERPL CALCULATED.3IONS-SCNC: 19 MMOL/L (ref 4–13)
ANION GAP SERPL CALCULATED.3IONS-SCNC: 20 MMOL/L (ref 4–13)
ANION GAP SERPL CALCULATED.3IONS-SCNC: 28 MMOL/L (ref 4–13)
BASE EX.OXY STD BLDV CALC-SCNC: 87.2 % (ref 60–80)
BASE EXCESS BLDV CALC-SCNC: -6.2 MMOL/L
BETA-HYDROXYBUTYRATE: 5.7 MMOL/L
BUN SERPL-MCNC: 18 MG/DL (ref 5–25)
BUN SERPL-MCNC: 23 MG/DL (ref 5–25)
BUN SERPL-MCNC: 48 MG/DL (ref 5–25)
BUN SERPL-MCNC: 51 MG/DL (ref 5–25)
CALCIUM SERPL-MCNC: 9 MG/DL (ref 8.3–10.1)
CALCIUM SERPL-MCNC: 9 MG/DL (ref 8.3–10.1)
CALCIUM SERPL-MCNC: 9.3 MG/DL (ref 8.3–10.1)
CALCIUM SERPL-MCNC: 9.3 MG/DL (ref 8.3–10.1)
CHLORIDE SERPL-SCNC: 93 MMOL/L (ref 100–108)
CHLORIDE SERPL-SCNC: 96 MMOL/L (ref 100–108)
CHLORIDE SERPL-SCNC: 98 MMOL/L (ref 100–108)
CHLORIDE SERPL-SCNC: 98 MMOL/L (ref 100–108)
CO2 SERPL-SCNC: 13 MMOL/L (ref 21–32)
CO2 SERPL-SCNC: 18 MMOL/L (ref 21–32)
CO2 SERPL-SCNC: 19 MMOL/L (ref 21–32)
CO2 SERPL-SCNC: 21 MMOL/L (ref 21–32)
CREAT SERPL-MCNC: 12.06 MG/DL (ref 0.6–1.3)
CREAT SERPL-MCNC: 12.32 MG/DL (ref 0.6–1.3)
CREAT SERPL-MCNC: 5.69 MG/DL (ref 0.6–1.3)
CREAT SERPL-MCNC: 6.73 MG/DL (ref 0.6–1.3)
ERYTHROCYTE [DISTWIDTH] IN BLOOD BY AUTOMATED COUNT: 13.9 % (ref 11.6–15.1)
GFR SERPL CREATININE-BSD FRML MDRD: 10 ML/MIN/1.73SQ M
GFR SERPL CREATININE-BSD FRML MDRD: 12 ML/MIN/1.73SQ M
GFR SERPL CREATININE-BSD FRML MDRD: 5 ML/MIN/1.73SQ M
GFR SERPL CREATININE-BSD FRML MDRD: 5 ML/MIN/1.73SQ M
GLUCOSE SERPL-MCNC: 127 MG/DL (ref 65–140)
GLUCOSE SERPL-MCNC: 135 MG/DL (ref 65–140)
GLUCOSE SERPL-MCNC: 142 MG/DL (ref 65–140)
GLUCOSE SERPL-MCNC: 194 MG/DL (ref 65–140)
GLUCOSE SERPL-MCNC: 202 MG/DL (ref 65–140)
GLUCOSE SERPL-MCNC: 206 MG/DL (ref 65–140)
GLUCOSE SERPL-MCNC: 228 MG/DL (ref 65–140)
GLUCOSE SERPL-MCNC: 232 MG/DL (ref 65–140)
GLUCOSE SERPL-MCNC: 235 MG/DL (ref 65–140)
GLUCOSE SERPL-MCNC: 240 MG/DL (ref 65–140)
GLUCOSE SERPL-MCNC: 242 MG/DL (ref 65–140)
GLUCOSE SERPL-MCNC: 263 MG/DL (ref 65–140)
GLUCOSE SERPL-MCNC: 264 MG/DL (ref 65–140)
GLUCOSE SERPL-MCNC: 265 MG/DL (ref 65–140)
GLUCOSE SERPL-MCNC: 292 MG/DL (ref 65–140)
GLUCOSE SERPL-MCNC: 300 MG/DL (ref 65–140)
GLUCOSE SERPL-MCNC: 340 MG/DL (ref 65–140)
GLUCOSE SERPL-MCNC: 377 MG/DL (ref 65–140)
GLUCOSE SERPL-MCNC: 78 MG/DL (ref 65–140)
GLUCOSE SERPL-MCNC: 90 MG/DL (ref 65–140)
GLUCOSE SERPL-MCNC: 96 MG/DL (ref 65–140)
GLUCOSE SERPL-MCNC: 99 MG/DL (ref 65–140)
GLUCOSE SERPL-MCNC: 99 MG/DL (ref 65–140)
HCO3 BLDV-SCNC: 19.2 MMOL/L (ref 24–30)
HCT VFR BLD AUTO: 28.7 % (ref 36.5–49.3)
HGB BLD-MCNC: 8.9 G/DL (ref 12–17)
LACTATE SERPL-SCNC: 0.7 MMOL/L (ref 0.5–2)
LACTATE SERPL-SCNC: 1.9 MMOL/L (ref 0.5–2)
MAGNESIUM SERPL-MCNC: 1.9 MG/DL (ref 1.6–2.6)
MAGNESIUM SERPL-MCNC: 2.3 MG/DL (ref 1.6–2.6)
MAGNESIUM SERPL-MCNC: 2.5 MG/DL (ref 1.6–2.6)
MAGNESIUM SERPL-MCNC: 2.6 MG/DL (ref 1.6–2.6)
MCH RBC QN AUTO: 30.7 PG (ref 26.8–34.3)
MCHC RBC AUTO-ENTMCNC: 31 G/DL (ref 31.4–37.4)
MCV RBC AUTO: 99 FL (ref 82–98)
O2 CT BLDV-SCNC: 11.6 ML/DL
PCO2 BLDV: 37.8 MM HG (ref 42–50)
PH BLDV: 7.32 [PH] (ref 7.3–7.4)
PHOSPHATE SERPL-MCNC: 3.4 MG/DL (ref 2.7–4.5)
PHOSPHATE SERPL-MCNC: 4.5 MG/DL (ref 2.7–4.5)
PHOSPHATE SERPL-MCNC: 5.4 MG/DL (ref 2.7–4.5)
PHOSPHATE SERPL-MCNC: 7.2 MG/DL (ref 2.7–4.5)
PHOSPHATE SERPL-MCNC: 8.8 MG/DL (ref 2.7–4.5)
PLATELET # BLD AUTO: 358 THOUSANDS/UL (ref 149–390)
PMV BLD AUTO: 9.8 FL (ref 8.9–12.7)
PO2 BLDV: 61.1 MM HG (ref 35–45)
POTASSIUM SERPL-SCNC: 3.8 MMOL/L (ref 3.5–5.3)
POTASSIUM SERPL-SCNC: 4.4 MMOL/L (ref 3.5–5.3)
POTASSIUM SERPL-SCNC: 4.4 MMOL/L (ref 3.5–5.3)
POTASSIUM SERPL-SCNC: 5.6 MMOL/L (ref 3.5–5.3)
RBC # BLD AUTO: 2.9 MILLION/UL (ref 3.88–5.62)
SODIUM SERPL-SCNC: 134 MMOL/L (ref 136–145)
SODIUM SERPL-SCNC: 135 MMOL/L (ref 136–145)
TROPONIN I SERPL-MCNC: 0.05 NG/ML
TROPONIN I SERPL-MCNC: 0.06 NG/ML
TROPONIN I SERPL-MCNC: 0.11 NG/ML
WBC # BLD AUTO: 7.52 THOUSAND/UL (ref 4.31–10.16)

## 2020-11-20 PROCEDURE — 82805 BLOOD GASES W/O2 SATURATION: CPT | Performed by: INTERNAL MEDICINE

## 2020-11-20 PROCEDURE — 71045 X-RAY EXAM CHEST 1 VIEW: CPT

## 2020-11-20 PROCEDURE — 99255 IP/OBS CONSLTJ NEW/EST HI 80: CPT | Performed by: INTERNAL MEDICINE

## 2020-11-20 PROCEDURE — 82010 KETONE BODYS QUAN: CPT | Performed by: INTERNAL MEDICINE

## 2020-11-20 PROCEDURE — 99233 SBSQ HOSP IP/OBS HIGH 50: CPT | Performed by: INTERNAL MEDICINE

## 2020-11-20 PROCEDURE — 82948 REAGENT STRIP/BLOOD GLUCOSE: CPT

## 2020-11-20 PROCEDURE — 84100 ASSAY OF PHOSPHORUS: CPT | Performed by: PHYSICAL MEDICINE & REHABILITATION

## 2020-11-20 PROCEDURE — 84100 ASSAY OF PHOSPHORUS: CPT | Performed by: INTERNAL MEDICINE

## 2020-11-20 PROCEDURE — 83735 ASSAY OF MAGNESIUM: CPT | Performed by: PHYSICAL MEDICINE & REHABILITATION

## 2020-11-20 PROCEDURE — 85027 COMPLETE CBC AUTOMATED: CPT | Performed by: PHYSICAL MEDICINE & REHABILITATION

## 2020-11-20 PROCEDURE — 74176 CT ABD & PELVIS W/O CONTRAST: CPT

## 2020-11-20 PROCEDURE — 80048 BASIC METABOLIC PNL TOTAL CA: CPT | Performed by: PHYSICAL MEDICINE & REHABILITATION

## 2020-11-20 PROCEDURE — G1004 CDSM NDSC: HCPCS

## 2020-11-20 PROCEDURE — 84484 ASSAY OF TROPONIN QUANT: CPT | Performed by: PHYSICAL MEDICINE & REHABILITATION

## 2020-11-20 PROCEDURE — 83605 ASSAY OF LACTIC ACID: CPT | Performed by: PHYSICAL MEDICINE & REHABILITATION

## 2020-11-20 PROCEDURE — NC001 PR NO CHARGE: Performed by: SURGERY

## 2020-11-20 PROCEDURE — 84484 ASSAY OF TROPONIN QUANT: CPT | Performed by: INTERNAL MEDICINE

## 2020-11-20 PROCEDURE — 99232 SBSQ HOSP IP/OBS MODERATE 35: CPT | Performed by: INTERNAL MEDICINE

## 2020-11-20 PROCEDURE — 83735 ASSAY OF MAGNESIUM: CPT | Performed by: INTERNAL MEDICINE

## 2020-11-20 PROCEDURE — 83605 ASSAY OF LACTIC ACID: CPT | Performed by: INTERNAL MEDICINE

## 2020-11-20 RX ORDER — POTASSIUM CHLORIDE 14.9 MG/ML
20 INJECTION INTRAVENOUS ONCE
Status: COMPLETED | OUTPATIENT
Start: 2020-11-20 | End: 2020-11-20

## 2020-11-20 RX ORDER — SODIUM CHLORIDE 9 MG/ML
50 INJECTION, SOLUTION INTRAVENOUS CONTINUOUS
Status: DISCONTINUED | OUTPATIENT
Start: 2020-11-20 | End: 2020-11-20

## 2020-11-20 RX ORDER — MAGNESIUM SULFATE HEPTAHYDRATE 40 MG/ML
2 INJECTION, SOLUTION INTRAVENOUS ONCE
Status: COMPLETED | OUTPATIENT
Start: 2020-11-20 | End: 2020-11-20

## 2020-11-20 RX ORDER — METOCLOPRAMIDE HYDROCHLORIDE 5 MG/ML
10 INJECTION INTRAMUSCULAR; INTRAVENOUS ONCE
Status: COMPLETED | OUTPATIENT
Start: 2020-11-20 | End: 2020-11-20

## 2020-11-20 RX ORDER — DEXTROSE MONOHYDRATE 25 G/50ML
25 INJECTION, SOLUTION INTRAVENOUS ONCE
Status: COMPLETED | OUTPATIENT
Start: 2020-11-20 | End: 2020-11-20

## 2020-11-20 RX ORDER — LABETALOL 20 MG/4 ML (5 MG/ML) INTRAVENOUS SYRINGE
10 EVERY 6 HOURS PRN
Status: DISCONTINUED | OUTPATIENT
Start: 2020-11-20 | End: 2020-11-25 | Stop reason: HOSPADM

## 2020-11-20 RX ORDER — DEXTROSE MONOHYDRATE 25 G/50ML
INJECTION, SOLUTION INTRAVENOUS
Status: DISPENSED
Start: 2020-11-20 | End: 2020-11-20

## 2020-11-20 RX ORDER — HYDRALAZINE HYDROCHLORIDE 20 MG/ML
10 INJECTION INTRAMUSCULAR; INTRAVENOUS ONCE
Status: COMPLETED | OUTPATIENT
Start: 2020-11-20 | End: 2020-11-20

## 2020-11-20 RX ORDER — HYDROXYZINE HYDROCHLORIDE 10 MG/1
10 TABLET, FILM COATED ORAL EVERY 6 HOURS PRN
Qty: 100 TABLET | Refills: 0 | Status: SHIPPED | OUTPATIENT
Start: 2020-11-20 | End: 2020-11-25 | Stop reason: HOSPADM

## 2020-11-20 RX ORDER — DEXTROSE AND SODIUM CHLORIDE 5; .45 G/100ML; G/100ML
75 INJECTION, SOLUTION INTRAVENOUS CONTINUOUS
Status: DISCONTINUED | OUTPATIENT
Start: 2020-11-20 | End: 2020-11-21

## 2020-11-20 RX ORDER — SODIUM CHLORIDE 9 MG/ML
1000 INJECTION, SOLUTION INTRAVENOUS CONTINUOUS
Status: DISCONTINUED | OUTPATIENT
Start: 2020-11-20 | End: 2020-11-20

## 2020-11-20 RX ORDER — HYDROMORPHONE HCL/PF 1 MG/ML
0.5 SYRINGE (ML) INJECTION ONCE
Status: DISCONTINUED | OUTPATIENT
Start: 2020-11-20 | End: 2020-11-22

## 2020-11-20 RX ORDER — POTASSIUM CHLORIDE 20 MEQ/1
20 TABLET, EXTENDED RELEASE ORAL ONCE
Status: COMPLETED | OUTPATIENT
Start: 2020-11-20 | End: 2020-11-20

## 2020-11-20 RX ORDER — INSULIN GLARGINE 100 [IU]/ML
5 INJECTION, SOLUTION SUBCUTANEOUS ONCE
Status: DISCONTINUED | OUTPATIENT
Start: 2020-11-20 | End: 2020-11-20

## 2020-11-20 RX ORDER — DEXTROSE AND SODIUM CHLORIDE 5; .9 G/100ML; G/100ML
250 INJECTION, SOLUTION INTRAVENOUS CONTINUOUS
Status: DISCONTINUED | OUTPATIENT
Start: 2020-11-20 | End: 2020-11-20

## 2020-11-20 RX ORDER — PROCHLORPERAZINE 25 MG
25 SUPPOSITORY, RECTAL RECTAL ONCE
Status: COMPLETED | OUTPATIENT
Start: 2020-11-20 | End: 2020-11-20

## 2020-11-20 RX ADMIN — PROCHLORPERAZINE 25 MG: 25 SUPPOSITORY RECTAL at 06:35

## 2020-11-20 RX ADMIN — HYDROMORPHONE HYDROCHLORIDE 0.5 MG: 1 INJECTION, SOLUTION INTRAMUSCULAR; INTRAVENOUS; SUBCUTANEOUS at 17:13

## 2020-11-20 RX ADMIN — DEXTROSE AND SODIUM CHLORIDE 50 ML/HR: 5; .45 INJECTION, SOLUTION INTRAVENOUS at 08:33

## 2020-11-20 RX ADMIN — HYDRALAZINE HYDROCHLORIDE 10 MG: 20 INJECTION INTRAMUSCULAR; INTRAVENOUS at 13:12

## 2020-11-20 RX ADMIN — INSULIN HUMAN 10 UNITS: 100 INJECTION, SOLUTION PARENTERAL at 06:56

## 2020-11-20 RX ADMIN — INSULIN LISPRO 5 UNITS: 100 INJECTION, SOLUTION INTRAVENOUS; SUBCUTANEOUS at 03:33

## 2020-11-20 RX ADMIN — DEXTROSE MONOHYDRATE 25 ML: 25 INJECTION, SOLUTION INTRAVENOUS at 11:21

## 2020-11-20 RX ADMIN — OXYCODONE HYDROCHLORIDE 5 MG: 5 TABLET ORAL at 07:01

## 2020-11-20 RX ADMIN — MAGNESIUM SULFATE IN WATER 2 G: 40 INJECTION, SOLUTION INTRAVENOUS at 17:13

## 2020-11-20 RX ADMIN — METOCLOPRAMIDE HYDROCHLORIDE 10 MG: 5 INJECTION INTRAMUSCULAR; INTRAVENOUS at 22:30

## 2020-11-20 RX ADMIN — OXYCODONE HYDROCHLORIDE 5 MG: 5 TABLET ORAL at 02:58

## 2020-11-20 RX ADMIN — HEPARIN SODIUM 5000 UNITS: 5000 INJECTION INTRAVENOUS; SUBCUTANEOUS at 06:34

## 2020-11-20 RX ADMIN — HYDROMORPHONE HYDROCHLORIDE 0.5 MG: 1 INJECTION, SOLUTION INTRAMUSCULAR; INTRAVENOUS; SUBCUTANEOUS at 13:34

## 2020-11-20 RX ADMIN — ONDANSETRON 4 MG: 2 INJECTION INTRAMUSCULAR; INTRAVENOUS at 19:50

## 2020-11-20 RX ADMIN — LABETALOL 20 MG/4 ML (5 MG/ML) INTRAVENOUS SYRINGE 10 MG: at 23:02

## 2020-11-20 RX ADMIN — EPOETIN ALFA 4000 UNITS: 4000 SOLUTION INTRAVENOUS; SUBCUTANEOUS at 10:13

## 2020-11-20 RX ADMIN — SODIUM CHLORIDE 500 ML/HR: 0.9 INJECTION, SOLUTION INTRAVENOUS at 07:22

## 2020-11-20 RX ADMIN — SODIUM CHLORIDE 9.8 UNITS/HR: 9 INJECTION, SOLUTION INTRAVENOUS at 06:51

## 2020-11-20 RX ADMIN — METOCLOPRAMIDE HYDROCHLORIDE 10 MG: 5 INJECTION INTRAMUSCULAR; INTRAVENOUS at 13:39

## 2020-11-20 RX ADMIN — SODIUM CHLORIDE 1000 ML/HR: 0.9 INJECTION, SOLUTION INTRAVENOUS at 06:22

## 2020-11-20 RX ADMIN — POTASSIUM CHLORIDE 20 MEQ: 14.9 INJECTION, SOLUTION INTRAVENOUS at 19:54

## 2020-11-20 RX ADMIN — ONDANSETRON 4 MG: 2 INJECTION INTRAMUSCULAR; INTRAVENOUS at 11:58

## 2020-11-20 RX ADMIN — LABETALOL 20 MG/4 ML (5 MG/ML) INTRAVENOUS SYRINGE 10 MG: at 14:29

## 2020-11-20 RX ADMIN — HEPARIN SODIUM 5000 UNITS: 5000 INJECTION INTRAVENOUS; SUBCUTANEOUS at 22:06

## 2020-11-20 RX ADMIN — ONDANSETRON 4 MG: 2 INJECTION INTRAMUSCULAR; INTRAVENOUS at 02:39

## 2020-11-21 LAB
ANION GAP SERPL CALCULATED.3IONS-SCNC: 12 MMOL/L (ref 4–13)
ANION GAP SERPL CALCULATED.3IONS-SCNC: 19 MMOL/L (ref 4–13)
ANION GAP SERPL CALCULATED.3IONS-SCNC: 20 MMOL/L (ref 4–13)
ANION GAP SERPL CALCULATED.3IONS-SCNC: 20 MMOL/L (ref 4–13)
ANION GAP SERPL CALCULATED.3IONS-SCNC: 21 MMOL/L (ref 4–13)
ANION GAP SERPL CALCULATED.3IONS-SCNC: 21 MMOL/L (ref 4–13)
ANION GAP SERPL CALCULATED.3IONS-SCNC: 25 MMOL/L (ref 4–13)
ARTERIAL PATENCY WRIST A: NO
BASE EX.OXY STD BLDV CALC-SCNC: 86.2 % (ref 60–80)
BASE EX.OXY STD BLDV CALC-SCNC: 97.2 % (ref 60–80)
BASE EXCESS BLDV CALC-SCNC: -2.8 MMOL/L
BASE EXCESS BLDV CALC-SCNC: -6.5 MMOL/L
BETA-HYDROXYBUTYRATE: 4.4 MMOL/L
BUN SERPL-MCNC: 24 MG/DL (ref 5–25)
BUN SERPL-MCNC: 28 MG/DL (ref 5–25)
BUN SERPL-MCNC: 31 MG/DL (ref 5–25)
BUN SERPL-MCNC: 32 MG/DL (ref 5–25)
CALCIUM SERPL-MCNC: 9.1 MG/DL (ref 8.3–10.1)
CALCIUM SERPL-MCNC: 9.3 MG/DL (ref 8.3–10.1)
CALCIUM SERPL-MCNC: 9.6 MG/DL (ref 8.3–10.1)
CALCIUM SERPL-MCNC: 9.6 MG/DL (ref 8.3–10.1)
CALCIUM SERPL-MCNC: 9.7 MG/DL (ref 8.3–10.1)
CALCIUM SERPL-MCNC: 9.9 MG/DL (ref 8.3–10.1)
CALCIUM SERPL-MCNC: 9.9 MG/DL (ref 8.3–10.1)
CHLORIDE SERPL-SCNC: 103 MMOL/L (ref 100–108)
CHLORIDE SERPL-SCNC: 93 MMOL/L (ref 100–108)
CHLORIDE SERPL-SCNC: 94 MMOL/L (ref 100–108)
CHLORIDE SERPL-SCNC: 96 MMOL/L (ref 100–108)
CHLORIDE SERPL-SCNC: 96 MMOL/L (ref 100–108)
CHLORIDE SERPL-SCNC: 97 MMOL/L (ref 100–108)
CHLORIDE SERPL-SCNC: 98 MMOL/L (ref 100–108)
CO2 SERPL-SCNC: 16 MMOL/L (ref 21–32)
CO2 SERPL-SCNC: 17 MMOL/L (ref 21–32)
CO2 SERPL-SCNC: 18 MMOL/L (ref 21–32)
CO2 SERPL-SCNC: 19 MMOL/L (ref 21–32)
CO2 SERPL-SCNC: 26 MMOL/L (ref 21–32)
CREAT SERPL-MCNC: 7.4 MG/DL (ref 0.6–1.3)
CREAT SERPL-MCNC: 7.42 MG/DL (ref 0.6–1.3)
CREAT SERPL-MCNC: 7.78 MG/DL (ref 0.6–1.3)
CREAT SERPL-MCNC: 8.11 MG/DL (ref 0.6–1.3)
CREAT SERPL-MCNC: 9.05 MG/DL (ref 0.6–1.3)
CREAT SERPL-MCNC: 9.45 MG/DL (ref 0.6–1.3)
CREAT SERPL-MCNC: 9.94 MG/DL (ref 0.6–1.3)
ERYTHROCYTE [DISTWIDTH] IN BLOOD BY AUTOMATED COUNT: 14.2 % (ref 11.6–15.1)
GFR SERPL CREATININE-BSD FRML MDRD: 6 ML/MIN/1.73SQ M
GFR SERPL CREATININE-BSD FRML MDRD: 6 ML/MIN/1.73SQ M
GFR SERPL CREATININE-BSD FRML MDRD: 7 ML/MIN/1.73SQ M
GFR SERPL CREATININE-BSD FRML MDRD: 8 ML/MIN/1.73SQ M
GFR SERPL CREATININE-BSD FRML MDRD: 9 ML/MIN/1.73SQ M
GLUCOSE SERPL-MCNC: 107 MG/DL (ref 65–140)
GLUCOSE SERPL-MCNC: 111 MG/DL (ref 65–140)
GLUCOSE SERPL-MCNC: 129 MG/DL (ref 65–140)
GLUCOSE SERPL-MCNC: 131 MG/DL (ref 65–140)
GLUCOSE SERPL-MCNC: 140 MG/DL (ref 65–140)
GLUCOSE SERPL-MCNC: 175 MG/DL (ref 65–140)
GLUCOSE SERPL-MCNC: 205 MG/DL (ref 65–140)
GLUCOSE SERPL-MCNC: 211 MG/DL (ref 65–140)
GLUCOSE SERPL-MCNC: 211 MG/DL (ref 65–140)
GLUCOSE SERPL-MCNC: 221 MG/DL (ref 65–140)
GLUCOSE SERPL-MCNC: 222 MG/DL (ref 65–140)
GLUCOSE SERPL-MCNC: 222 MG/DL (ref 65–140)
GLUCOSE SERPL-MCNC: 226 MG/DL (ref 65–140)
GLUCOSE SERPL-MCNC: 228 MG/DL (ref 65–140)
GLUCOSE SERPL-MCNC: 229 MG/DL (ref 65–140)
GLUCOSE SERPL-MCNC: 232 MG/DL (ref 65–140)
GLUCOSE SERPL-MCNC: 236 MG/DL (ref 65–140)
GLUCOSE SERPL-MCNC: 240 MG/DL (ref 65–140)
GLUCOSE SERPL-MCNC: 247 MG/DL (ref 65–140)
GLUCOSE SERPL-MCNC: 264 MG/DL (ref 65–140)
GLUCOSE SERPL-MCNC: 319 MG/DL (ref 65–140)
GLUCOSE SERPL-MCNC: 319 MG/DL (ref 65–140)
GLUCOSE SERPL-MCNC: 343 MG/DL (ref 65–140)
GLUCOSE SERPL-MCNC: 370 MG/DL (ref 65–140)
GLUCOSE SERPL-MCNC: 380 MG/DL (ref 65–140)
GLUCOSE SERPL-MCNC: 382 MG/DL (ref 65–140)
GLUCOSE SERPL-MCNC: 385 MG/DL (ref 65–140)
GLUCOSE SERPL-MCNC: 393 MG/DL (ref 65–140)
GLUCOSE SERPL-MCNC: 404 MG/DL (ref 65–140)
GLUCOSE SERPL-MCNC: 555 MG/DL (ref 65–140)
GLUCOSE SERPL-MCNC: 67 MG/DL (ref 65–140)
GLUCOSE SERPL-MCNC: 83 MG/DL (ref 65–140)
HCO3 BLDV-SCNC: 18.2 MMOL/L (ref 24–30)
HCO3 BLDV-SCNC: 21.9 MMOL/L (ref 24–30)
HCT VFR BLD AUTO: 29.8 % (ref 36.5–49.3)
HGB BLD-MCNC: 9.6 G/DL (ref 12–17)
LACTATE SERPL-SCNC: 0.9 MMOL/L (ref 0.5–2)
LACTATE SERPL-SCNC: 1.2 MMOL/L (ref 0.5–2)
LIPASE SERPL-CCNC: 28 U/L (ref 73–393)
MAGNESIUM SERPL-MCNC: 2.6 MG/DL (ref 1.6–2.6)
MAGNESIUM SERPL-MCNC: 2.7 MG/DL (ref 1.6–2.6)
MAGNESIUM SERPL-MCNC: 2.7 MG/DL (ref 1.6–2.6)
MAGNESIUM SERPL-MCNC: 2.8 MG/DL (ref 1.6–2.6)
MAGNESIUM SERPL-MCNC: 2.8 MG/DL (ref 1.6–2.6)
MCH RBC QN AUTO: 30.8 PG (ref 26.8–34.3)
MCHC RBC AUTO-ENTMCNC: 32.2 G/DL (ref 31.4–37.4)
MCV RBC AUTO: 96 FL (ref 82–98)
O2 CT BLDV-SCNC: 12.8 ML/DL
O2 CT BLDV-SCNC: 14.4 ML/DL
PCO2 BLDV: 33.4 MM HG (ref 42–50)
PCO2 BLDV: 37.2 MM HG (ref 42–50)
PH BLDV: 7.36 [PH] (ref 7.3–7.4)
PH BLDV: 7.39 [PH] (ref 7.3–7.4)
PHOSPHATE SERPL-MCNC: 4.9 MG/DL (ref 2.7–4.5)
PHOSPHATE SERPL-MCNC: 5.6 MG/DL (ref 2.7–4.5)
PHOSPHATE SERPL-MCNC: 5.7 MG/DL (ref 2.7–4.5)
PHOSPHATE SERPL-MCNC: 5.7 MG/DL (ref 2.7–4.5)
PHOSPHATE SERPL-MCNC: 6.1 MG/DL (ref 2.7–4.5)
PHOSPHATE SERPL-MCNC: 7 MG/DL (ref 2.7–4.5)
PLATELET # BLD AUTO: 437 THOUSANDS/UL (ref 149–390)
PMV BLD AUTO: 9.5 FL (ref 8.9–12.7)
PO2 BLDV: 167.4 MM HG (ref 35–45)
PO2 BLDV: 57.2 MM HG (ref 35–45)
POTASSIUM SERPL-SCNC: 3.8 MMOL/L (ref 3.5–5.3)
POTASSIUM SERPL-SCNC: 4 MMOL/L (ref 3.5–5.3)
POTASSIUM SERPL-SCNC: 4.1 MMOL/L (ref 3.5–5.3)
POTASSIUM SERPL-SCNC: 4.5 MMOL/L (ref 3.5–5.3)
POTASSIUM SERPL-SCNC: 4.5 MMOL/L (ref 3.5–5.3)
POTASSIUM SERPL-SCNC: 4.8 MMOL/L (ref 3.5–5.3)
POTASSIUM SERPL-SCNC: 4.8 MMOL/L (ref 3.5–5.3)
RBC # BLD AUTO: 3.12 MILLION/UL (ref 3.88–5.62)
SODIUM SERPL-SCNC: 131 MMOL/L (ref 136–145)
SODIUM SERPL-SCNC: 134 MMOL/L (ref 136–145)
SODIUM SERPL-SCNC: 135 MMOL/L (ref 136–145)
SODIUM SERPL-SCNC: 138 MMOL/L (ref 136–145)
SODIUM SERPL-SCNC: 141 MMOL/L (ref 136–145)
WBC # BLD AUTO: 10.88 THOUSAND/UL (ref 4.31–10.16)

## 2020-11-21 PROCEDURE — 83735 ASSAY OF MAGNESIUM: CPT | Performed by: FAMILY MEDICINE

## 2020-11-21 PROCEDURE — 99232 SBSQ HOSP IP/OBS MODERATE 35: CPT | Performed by: INTERNAL MEDICINE

## 2020-11-21 PROCEDURE — 82010 KETONE BODYS QUAN: CPT | Performed by: NURSE PRACTITIONER

## 2020-11-21 PROCEDURE — 83605 ASSAY OF LACTIC ACID: CPT | Performed by: PHYSICIAN ASSISTANT

## 2020-11-21 PROCEDURE — 93005 ELECTROCARDIOGRAM TRACING: CPT

## 2020-11-21 PROCEDURE — 80048 BASIC METABOLIC PNL TOTAL CA: CPT | Performed by: FAMILY MEDICINE

## 2020-11-21 PROCEDURE — 82805 BLOOD GASES W/O2 SATURATION: CPT | Performed by: PHYSICIAN ASSISTANT

## 2020-11-21 PROCEDURE — 83735 ASSAY OF MAGNESIUM: CPT | Performed by: PHYSICAL MEDICINE & REHABILITATION

## 2020-11-21 PROCEDURE — 83690 ASSAY OF LIPASE: CPT | Performed by: FAMILY MEDICINE

## 2020-11-21 PROCEDURE — 80048 BASIC METABOLIC PNL TOTAL CA: CPT | Performed by: PHYSICAL MEDICINE & REHABILITATION

## 2020-11-21 PROCEDURE — 82805 BLOOD GASES W/O2 SATURATION: CPT | Performed by: NURSE PRACTITIONER

## 2020-11-21 PROCEDURE — 84100 ASSAY OF PHOSPHORUS: CPT | Performed by: PHYSICAL MEDICINE & REHABILITATION

## 2020-11-21 PROCEDURE — 83605 ASSAY OF LACTIC ACID: CPT | Performed by: FAMILY MEDICINE

## 2020-11-21 PROCEDURE — 84100 ASSAY OF PHOSPHORUS: CPT | Performed by: FAMILY MEDICINE

## 2020-11-21 PROCEDURE — 85027 COMPLETE CBC AUTOMATED: CPT | Performed by: PHYSICAL MEDICINE & REHABILITATION

## 2020-11-21 PROCEDURE — 82948 REAGENT STRIP/BLOOD GLUCOSE: CPT

## 2020-11-21 PROCEDURE — 99255 IP/OBS CONSLTJ NEW/EST HI 80: CPT | Performed by: INTERNAL MEDICINE

## 2020-11-21 RX ORDER — SODIUM CHLORIDE 9 MG/ML
100 INJECTION, SOLUTION INTRAVENOUS CONTINUOUS
Status: DISCONTINUED | OUTPATIENT
Start: 2020-11-21 | End: 2020-11-21

## 2020-11-21 RX ORDER — DEXTROSE AND SODIUM CHLORIDE 5; .45 G/100ML; G/100ML
250 INJECTION, SOLUTION INTRAVENOUS CONTINUOUS
Status: DISCONTINUED | OUTPATIENT
Start: 2020-11-21 | End: 2020-11-21

## 2020-11-21 RX ORDER — DEXTROSE AND SODIUM CHLORIDE 5; .45 G/100ML; G/100ML
100 INJECTION, SOLUTION INTRAVENOUS CONTINUOUS
Status: DISCONTINUED | OUTPATIENT
Start: 2020-11-21 | End: 2020-11-22

## 2020-11-21 RX ORDER — SODIUM CHLORIDE 9 MG/ML
500 INJECTION, SOLUTION INTRAVENOUS CONTINUOUS
Status: DISCONTINUED | OUTPATIENT
Start: 2020-11-21 | End: 2020-11-21

## 2020-11-21 RX ORDER — POTASSIUM CHLORIDE 14.9 MG/ML
20 INJECTION INTRAVENOUS ONCE
Status: COMPLETED | OUTPATIENT
Start: 2020-11-21 | End: 2020-11-21

## 2020-11-21 RX ORDER — ONDANSETRON 2 MG/ML
4 INJECTION INTRAMUSCULAR; INTRAVENOUS EVERY 6 HOURS PRN
Status: DISCONTINUED | OUTPATIENT
Start: 2020-11-21 | End: 2020-11-25 | Stop reason: HOSPADM

## 2020-11-21 RX ORDER — SODIUM CHLORIDE 9 MG/ML
250 INJECTION, SOLUTION INTRAVENOUS CONTINUOUS
Status: DISCONTINUED | OUTPATIENT
Start: 2020-11-21 | End: 2020-11-21

## 2020-11-21 RX ORDER — METOCLOPRAMIDE HYDROCHLORIDE 5 MG/ML
10 INJECTION INTRAMUSCULAR; INTRAVENOUS EVERY 6 HOURS PRN
Status: DISCONTINUED | OUTPATIENT
Start: 2020-11-21 | End: 2020-11-25 | Stop reason: HOSPADM

## 2020-11-21 RX ORDER — DEXTROSE MONOHYDRATE 25 G/50ML
50 INJECTION, SOLUTION INTRAVENOUS ONCE
Status: COMPLETED | OUTPATIENT
Start: 2020-11-21 | End: 2020-11-21

## 2020-11-21 RX ORDER — HYDRALAZINE HYDROCHLORIDE 20 MG/ML
10 INJECTION INTRAMUSCULAR; INTRAVENOUS EVERY 6 HOURS PRN
Status: DISCONTINUED | OUTPATIENT
Start: 2020-11-21 | End: 2020-11-25 | Stop reason: HOSPADM

## 2020-11-21 RX ORDER — SODIUM CHLORIDE 9 MG/ML
75 INJECTION, SOLUTION INTRAVENOUS CONTINUOUS
Status: DISCONTINUED | OUTPATIENT
Start: 2020-11-21 | End: 2020-11-21

## 2020-11-21 RX ORDER — SODIUM CHLORIDE 9 MG/ML
1000 INJECTION, SOLUTION INTRAVENOUS CONTINUOUS
Status: DISCONTINUED | OUTPATIENT
Start: 2020-11-21 | End: 2020-11-21

## 2020-11-21 RX ORDER — POTASSIUM CHLORIDE 14.9 MG/ML
20 INJECTION INTRAVENOUS ONCE
Status: DISCONTINUED | OUTPATIENT
Start: 2020-11-21 | End: 2020-11-22

## 2020-11-21 RX ADMIN — DEXTROSE AND SODIUM CHLORIDE 100 ML/HR: 5; .45 INJECTION, SOLUTION INTRAVENOUS at 17:58

## 2020-11-21 RX ADMIN — SODIUM CHLORIDE 500 ML: 0.9 INJECTION, SOLUTION INTRAVENOUS at 11:35

## 2020-11-21 RX ADMIN — HYDRALAZINE HYDROCHLORIDE 25 MG: 25 TABLET, FILM COATED ORAL at 21:22

## 2020-11-21 RX ADMIN — Medication 9.6 UNITS/HR: at 15:46

## 2020-11-21 RX ADMIN — METOCLOPRAMIDE HYDROCHLORIDE 10 MG: 5 INJECTION INTRAMUSCULAR; INTRAVENOUS at 18:10

## 2020-11-21 RX ADMIN — ONDANSETRON 4 MG: 2 INJECTION INTRAMUSCULAR; INTRAVENOUS at 21:20

## 2020-11-21 RX ADMIN — GABAPENTIN 100 MG: 100 CAPSULE ORAL at 22:12

## 2020-11-21 RX ADMIN — LABETALOL 20 MG/4 ML (5 MG/ML) INTRAVENOUS SYRINGE 10 MG: at 23:12

## 2020-11-21 RX ADMIN — SODIUM CHLORIDE 100 ML/HR: 0.9 INJECTION, SOLUTION INTRAVENOUS at 16:21

## 2020-11-21 RX ADMIN — HYDRALAZINE HYDROCHLORIDE 10 MG: 20 INJECTION INTRAMUSCULAR; INTRAVENOUS at 19:45

## 2020-11-21 RX ADMIN — LABETALOL 20 MG/4 ML (5 MG/ML) INTRAVENOUS SYRINGE 10 MG: at 08:53

## 2020-11-21 RX ADMIN — METOCLOPRAMIDE HYDROCHLORIDE 10 MG: 5 INJECTION INTRAMUSCULAR; INTRAVENOUS at 04:24

## 2020-11-21 RX ADMIN — LABETALOL HYDROCHLORIDE 300 MG: 200 TABLET, FILM COATED ORAL at 21:21

## 2020-11-21 RX ADMIN — SODIUM CHLORIDE 19.2 UNITS/HR: 9 INJECTION, SOLUTION INTRAVENOUS at 14:30

## 2020-11-21 RX ADMIN — HEPARIN SODIUM 5000 UNITS: 5000 INJECTION INTRAVENOUS; SUBCUTANEOUS at 18:03

## 2020-11-21 RX ADMIN — POTASSIUM CHLORIDE 20 MEQ: 14.9 INJECTION, SOLUTION INTRAVENOUS at 13:53

## 2020-11-21 RX ADMIN — DEXTROSE MONOHYDRATE 50 ML: 500 INJECTION PARENTERAL at 21:14

## 2020-11-21 RX ADMIN — DEXTROSE AND SODIUM CHLORIDE 50 ML/HR: 5; .45 INJECTION, SOLUTION INTRAVENOUS at 04:24

## 2020-11-21 RX ADMIN — ONDANSETRON 4 MG: 2 INJECTION INTRAMUSCULAR; INTRAVENOUS at 11:35

## 2020-11-21 RX ADMIN — DOXAZOSIN 2 MG: 1 TABLET ORAL at 22:12

## 2020-11-21 RX ADMIN — HYDRALAZINE HYDROCHLORIDE 10 MG: 20 INJECTION INTRAMUSCULAR; INTRAVENOUS at 04:19

## 2020-11-21 RX ADMIN — HYDRALAZINE HYDROCHLORIDE 10 MG: 20 INJECTION INTRAMUSCULAR; INTRAVENOUS at 12:17

## 2020-11-21 RX ADMIN — HEPARIN SODIUM 5000 UNITS: 5000 INJECTION INTRAVENOUS; SUBCUTANEOUS at 21:20

## 2020-11-21 RX ADMIN — HEPARIN SODIUM 5000 UNITS: 5000 INJECTION INTRAVENOUS; SUBCUTANEOUS at 05:22

## 2020-11-21 RX ADMIN — SODIUM CHLORIDE 500 ML: 0.9 INJECTION, SOLUTION INTRAVENOUS at 15:45

## 2020-11-21 RX ADMIN — TRIMETHOBENZAMIDE HYDROCHLORIDE 200 MG: 100 INJECTION INTRAMUSCULAR at 03:40

## 2020-11-22 LAB
ANION GAP SERPL CALCULATED.3IONS-SCNC: 12 MMOL/L (ref 4–13)
ANION GAP SERPL CALCULATED.3IONS-SCNC: 14 MMOL/L (ref 4–13)
ANION GAP SERPL CALCULATED.3IONS-SCNC: 17 MMOL/L (ref 4–13)
ANION GAP SERPL CALCULATED.3IONS-SCNC: 20 MMOL/L (ref 4–13)
ATRIAL RATE: 100 BPM
ATRIAL RATE: 98 BPM
BUN SERPL-MCNC: 34 MG/DL (ref 5–25)
BUN SERPL-MCNC: 42 MG/DL (ref 5–25)
BUN SERPL-MCNC: 42 MG/DL (ref 5–25)
BUN SERPL-MCNC: 43 MG/DL (ref 5–25)
CALCIUM SERPL-MCNC: 9 MG/DL (ref 8.3–10.1)
CALCIUM SERPL-MCNC: 9.3 MG/DL (ref 8.3–10.1)
CALCIUM SERPL-MCNC: 9.5 MG/DL (ref 8.3–10.1)
CALCIUM SERPL-MCNC: 9.6 MG/DL (ref 8.3–10.1)
CHLORIDE SERPL-SCNC: 101 MMOL/L (ref 100–108)
CHLORIDE SERPL-SCNC: 103 MMOL/L (ref 100–108)
CHLORIDE SERPL-SCNC: 103 MMOL/L (ref 100–108)
CHLORIDE SERPL-SCNC: 97 MMOL/L (ref 100–108)
CO2 SERPL-SCNC: 17 MMOL/L (ref 21–32)
CO2 SERPL-SCNC: 21 MMOL/L (ref 21–32)
CO2 SERPL-SCNC: 24 MMOL/L (ref 21–32)
CO2 SERPL-SCNC: 25 MMOL/L (ref 21–32)
CREAT SERPL-MCNC: 10.13 MG/DL (ref 0.6–1.3)
CREAT SERPL-MCNC: 11.69 MG/DL (ref 0.6–1.3)
CREAT SERPL-MCNC: 11.96 MG/DL (ref 0.6–1.3)
CREAT SERPL-MCNC: 12.01 MG/DL (ref 0.6–1.3)
ERYTHROCYTE [DISTWIDTH] IN BLOOD BY AUTOMATED COUNT: 14.4 % (ref 11.6–15.1)
GFR SERPL CREATININE-BSD FRML MDRD: 5 ML/MIN/1.73SQ M
GFR SERPL CREATININE-BSD FRML MDRD: 6 ML/MIN/1.73SQ M
GLUCOSE SERPL-MCNC: 109 MG/DL (ref 65–140)
GLUCOSE SERPL-MCNC: 120 MG/DL (ref 65–140)
GLUCOSE SERPL-MCNC: 132 MG/DL (ref 65–140)
GLUCOSE SERPL-MCNC: 134 MG/DL (ref 65–140)
GLUCOSE SERPL-MCNC: 135 MG/DL (ref 65–140)
GLUCOSE SERPL-MCNC: 144 MG/DL (ref 65–140)
GLUCOSE SERPL-MCNC: 153 MG/DL (ref 65–140)
GLUCOSE SERPL-MCNC: 161 MG/DL (ref 65–140)
GLUCOSE SERPL-MCNC: 181 MG/DL (ref 65–140)
GLUCOSE SERPL-MCNC: 185 MG/DL (ref 65–140)
GLUCOSE SERPL-MCNC: 220 MG/DL (ref 65–140)
GLUCOSE SERPL-MCNC: 257 MG/DL (ref 65–140)
GLUCOSE SERPL-MCNC: 264 MG/DL (ref 65–140)
GLUCOSE SERPL-MCNC: 320 MG/DL (ref 65–140)
GLUCOSE SERPL-MCNC: 339 MG/DL (ref 65–140)
GLUCOSE SERPL-MCNC: 339 MG/DL (ref 65–140)
GLUCOSE SERPL-MCNC: 342 MG/DL (ref 65–140)
GLUCOSE SERPL-MCNC: 346 MG/DL (ref 65–140)
GLUCOSE SERPL-MCNC: 358 MG/DL (ref 65–140)
GLUCOSE SERPL-MCNC: 373 MG/DL (ref 65–140)
GLUCOSE SERPL-MCNC: 374 MG/DL (ref 65–140)
GLUCOSE SERPL-MCNC: 56 MG/DL (ref 65–140)
GLUCOSE SERPL-MCNC: 59 MG/DL (ref 65–140)
GLUCOSE SERPL-MCNC: 64 MG/DL (ref 65–140)
HCT VFR BLD AUTO: 27.6 % (ref 36.5–49.3)
HGB BLD-MCNC: 8.8 G/DL (ref 12–17)
MAGNESIUM SERPL-MCNC: 2.7 MG/DL (ref 1.6–2.6)
MAGNESIUM SERPL-MCNC: 2.9 MG/DL (ref 1.6–2.6)
MAGNESIUM SERPL-MCNC: 3 MG/DL (ref 1.6–2.6)
MCH RBC QN AUTO: 30.6 PG (ref 26.8–34.3)
MCHC RBC AUTO-ENTMCNC: 31.9 G/DL (ref 31.4–37.4)
MCV RBC AUTO: 96 FL (ref 82–98)
P AXIS: 68 DEGREES
P AXIS: 69 DEGREES
PHOSPHATE SERPL-MCNC: 5.8 MG/DL (ref 2.7–4.5)
PHOSPHATE SERPL-MCNC: 6.9 MG/DL (ref 2.7–4.5)
PHOSPHATE SERPL-MCNC: 7.1 MG/DL (ref 2.7–4.5)
PLATELET # BLD AUTO: 407 THOUSANDS/UL (ref 149–390)
PMV BLD AUTO: 9.5 FL (ref 8.9–12.7)
POTASSIUM SERPL-SCNC: 3.9 MMOL/L (ref 3.5–5.3)
POTASSIUM SERPL-SCNC: 3.9 MMOL/L (ref 3.5–5.3)
POTASSIUM SERPL-SCNC: 4.6 MMOL/L (ref 3.5–5.3)
POTASSIUM SERPL-SCNC: 4.7 MMOL/L (ref 3.5–5.3)
PR INTERVAL: 148 MS
PR INTERVAL: 156 MS
QRS AXIS: 64 DEGREES
QRS AXIS: 65 DEGREES
QRSD INTERVAL: 82 MS
QRSD INTERVAL: 84 MS
QT INTERVAL: 352 MS
QT INTERVAL: 354 MS
QTC INTERVAL: 451 MS
QTC INTERVAL: 454 MS
RBC # BLD AUTO: 2.88 MILLION/UL (ref 3.88–5.62)
SODIUM SERPL-SCNC: 134 MMOL/L (ref 136–145)
SODIUM SERPL-SCNC: 139 MMOL/L (ref 136–145)
SODIUM SERPL-SCNC: 140 MMOL/L (ref 136–145)
SODIUM SERPL-SCNC: 141 MMOL/L (ref 136–145)
T WAVE AXIS: 34 DEGREES
T WAVE AXIS: 37 DEGREES
VENTRICULAR RATE: 100 BPM
VENTRICULAR RATE: 98 BPM
WBC # BLD AUTO: 11.4 THOUSAND/UL (ref 4.31–10.16)

## 2020-11-22 PROCEDURE — 85027 COMPLETE CBC AUTOMATED: CPT | Performed by: FAMILY MEDICINE

## 2020-11-22 PROCEDURE — 80048 BASIC METABOLIC PNL TOTAL CA: CPT | Performed by: FAMILY MEDICINE

## 2020-11-22 PROCEDURE — 99232 SBSQ HOSP IP/OBS MODERATE 35: CPT | Performed by: INTERNAL MEDICINE

## 2020-11-22 PROCEDURE — 84100 ASSAY OF PHOSPHORUS: CPT | Performed by: NURSE PRACTITIONER

## 2020-11-22 PROCEDURE — 83735 ASSAY OF MAGNESIUM: CPT | Performed by: NURSE PRACTITIONER

## 2020-11-22 PROCEDURE — 99233 SBSQ HOSP IP/OBS HIGH 50: CPT | Performed by: INTERNAL MEDICINE

## 2020-11-22 PROCEDURE — 80048 BASIC METABOLIC PNL TOTAL CA: CPT | Performed by: NURSE PRACTITIONER

## 2020-11-22 PROCEDURE — 93010 ELECTROCARDIOGRAM REPORT: CPT | Performed by: INTERNAL MEDICINE

## 2020-11-22 PROCEDURE — 82948 REAGENT STRIP/BLOOD GLUCOSE: CPT

## 2020-11-22 PROCEDURE — 83735 ASSAY OF MAGNESIUM: CPT | Performed by: FAMILY MEDICINE

## 2020-11-22 PROCEDURE — 84100 ASSAY OF PHOSPHORUS: CPT | Performed by: FAMILY MEDICINE

## 2020-11-22 RX ORDER — SODIUM CHLORIDE, SODIUM GLUCONATE, SODIUM ACETATE, POTASSIUM CHLORIDE, MAGNESIUM CHLORIDE, SODIUM PHOSPHATE, DIBASIC, AND POTASSIUM PHOSPHATE .53; .5; .37; .037; .03; .012; .00082 G/100ML; G/100ML; G/100ML; G/100ML; G/100ML; G/100ML; G/100ML
100 INJECTION, SOLUTION INTRAVENOUS CONTINUOUS
Status: DISCONTINUED | OUTPATIENT
Start: 2020-11-22 | End: 2020-11-23

## 2020-11-22 RX ORDER — SODIUM CHLORIDE 9 MG/ML
500 INJECTION, SOLUTION INTRAVENOUS CONTINUOUS
Status: DISCONTINUED | OUTPATIENT
Start: 2020-11-22 | End: 2020-11-22

## 2020-11-22 RX ORDER — DEXTROSE MONOHYDRATE 25 G/50ML
INJECTION, SOLUTION INTRAVENOUS
Status: DISPENSED
Start: 2020-11-22 | End: 2020-11-23

## 2020-11-22 RX ORDER — SODIUM CHLORIDE 9 MG/ML
1000 INJECTION, SOLUTION INTRAVENOUS CONTINUOUS
Status: DISCONTINUED | OUTPATIENT
Start: 2020-11-22 | End: 2020-11-22

## 2020-11-22 RX ORDER — DEXTROSE AND SODIUM CHLORIDE 5; .45 G/100ML; G/100ML
100 INJECTION, SOLUTION INTRAVENOUS CONTINUOUS
Status: DISCONTINUED | OUTPATIENT
Start: 2020-11-22 | End: 2020-11-24

## 2020-11-22 RX ORDER — SODIUM CHLORIDE 9 MG/ML
250 INJECTION, SOLUTION INTRAVENOUS CONTINUOUS
Status: DISCONTINUED | OUTPATIENT
Start: 2020-11-22 | End: 2020-11-22

## 2020-11-22 RX ORDER — SODIUM CHLORIDE, SODIUM GLUCONATE, SODIUM ACETATE, POTASSIUM CHLORIDE, MAGNESIUM CHLORIDE, SODIUM PHOSPHATE, DIBASIC, AND POTASSIUM PHOSPHATE .53; .5; .37; .037; .03; .012; .00082 G/100ML; G/100ML; G/100ML; G/100ML; G/100ML; G/100ML; G/100ML
500 INJECTION, SOLUTION INTRAVENOUS ONCE
Status: COMPLETED | OUTPATIENT
Start: 2020-11-22 | End: 2020-11-23

## 2020-11-22 RX ADMIN — Medication 9.7 UNITS/HR: at 15:14

## 2020-11-22 RX ADMIN — NIFEDIPINE 60 MG: 30 TABLET, FILM COATED, EXTENDED RELEASE ORAL at 20:33

## 2020-11-22 RX ADMIN — Medication 250 MG: at 18:01

## 2020-11-22 RX ADMIN — DEXTROSE AND SODIUM CHLORIDE 100 ML/HR: 5; .45 INJECTION, SOLUTION INTRAVENOUS at 16:28

## 2020-11-22 RX ADMIN — NIFEDIPINE 60 MG: 30 TABLET, FILM COATED, EXTENDED RELEASE ORAL at 09:11

## 2020-11-22 RX ADMIN — HYDRALAZINE HYDROCHLORIDE 25 MG: 25 TABLET, FILM COATED ORAL at 21:58

## 2020-11-22 RX ADMIN — B-COMPLEX W/ C & FOLIC ACID TAB 1 TABLET: TAB at 12:22

## 2020-11-22 RX ADMIN — ONDANSETRON 4 MG: 2 INJECTION INTRAMUSCULAR; INTRAVENOUS at 07:40

## 2020-11-22 RX ADMIN — GABAPENTIN 100 MG: 100 CAPSULE ORAL at 21:58

## 2020-11-22 RX ADMIN — DOXAZOSIN 2 MG: 1 TABLET ORAL at 20:33

## 2020-11-22 RX ADMIN — SODIUM CHLORIDE, SODIUM GLUCONATE, SODIUM ACETATE, POTASSIUM CHLORIDE, MAGNESIUM CHLORIDE, SODIUM PHOSPHATE, DIBASIC, AND POTASSIUM PHOSPHATE 500 ML: .53; .5; .37; .037; .03; .012; .00082 INJECTION, SOLUTION INTRAVENOUS at 15:12

## 2020-11-22 RX ADMIN — HYDRALAZINE HYDROCHLORIDE 10 MG: 20 INJECTION INTRAMUSCULAR; INTRAVENOUS at 07:40

## 2020-11-22 RX ADMIN — ESCITALOPRAM OXALATE 10 MG: 10 TABLET ORAL at 09:11

## 2020-11-22 RX ADMIN — CINACALCET 60 MG: 30 TABLET, FILM COATED ORAL at 09:13

## 2020-11-22 RX ADMIN — METOCLOPRAMIDE HYDROCHLORIDE 10 MG: 5 INJECTION INTRAMUSCULAR; INTRAVENOUS at 09:18

## 2020-11-22 RX ADMIN — HEPARIN SODIUM 5000 UNITS: 5000 INJECTION INTRAVENOUS; SUBCUTANEOUS at 21:58

## 2020-11-22 RX ADMIN — LABETALOL HYDROCHLORIDE 300 MG: 200 TABLET, FILM COATED ORAL at 09:11

## 2020-11-22 RX ADMIN — Medication 250 MG: at 09:10

## 2020-11-22 RX ADMIN — Medication 4.9 UNITS/HR: at 20:16

## 2020-11-22 RX ADMIN — SODIUM CHLORIDE, SODIUM GLUCONATE, SODIUM ACETATE, POTASSIUM CHLORIDE, MAGNESIUM CHLORIDE, SODIUM PHOSPHATE, DIBASIC, AND POTASSIUM PHOSPHATE 100 ML/HR: .53; .5; .37; .037; .03; .012; .00082 INJECTION, SOLUTION INTRAVENOUS at 14:36

## 2020-11-22 RX ADMIN — SODIUM CHLORIDE 12 UNITS/HR: 9 INJECTION, SOLUTION INTRAVENOUS at 12:18

## 2020-11-22 RX ADMIN — LISINOPRIL 20 MG: 20 TABLET ORAL at 18:01

## 2020-11-22 RX ADMIN — ATORVASTATIN CALCIUM 40 MG: 40 TABLET, FILM COATED ORAL at 18:01

## 2020-11-22 RX ADMIN — HEPARIN SODIUM 5000 UNITS: 5000 INJECTION INTRAVENOUS; SUBCUTANEOUS at 05:32

## 2020-11-22 RX ADMIN — FAMOTIDINE 40 MG: 20 TABLET ORAL at 09:10

## 2020-11-22 RX ADMIN — SODIUM CHLORIDE 7 UNITS/HR: 9 INJECTION, SOLUTION INTRAVENOUS at 05:32

## 2020-11-22 RX ADMIN — HEPARIN SODIUM 5000 UNITS: 5000 INJECTION INTRAVENOUS; SUBCUTANEOUS at 14:35

## 2020-11-22 RX ADMIN — LABETALOL HYDROCHLORIDE 300 MG: 200 TABLET, FILM COATED ORAL at 20:32

## 2020-11-22 RX ADMIN — ASPIRIN 81 MG CHEWABLE TABLET 81 MG: 81 TABLET CHEWABLE at 09:11

## 2020-11-22 RX ADMIN — DOCUSATE SODIUM 100 MG: 100 CAPSULE ORAL at 09:11

## 2020-11-22 RX ADMIN — LABETALOL 20 MG/4 ML (5 MG/ML) INTRAVENOUS SYRINGE 10 MG: at 09:28

## 2020-11-22 RX ADMIN — DOCUSATE SODIUM 100 MG: 100 CAPSULE ORAL at 18:01

## 2020-11-23 ENCOUNTER — APPOINTMENT (INPATIENT)
Dept: DIALYSIS | Facility: HOSPITAL | Age: 35
DRG: 907 | End: 2020-11-23
Payer: MEDICARE

## 2020-11-23 PROBLEM — E46 PROTEIN-CALORIE MALNUTRITION (HCC): Status: ACTIVE | Noted: 2020-11-23

## 2020-11-23 LAB
ANION GAP SERPL CALCULATED.3IONS-SCNC: 13 MMOL/L (ref 4–13)
ANION GAP SERPL CALCULATED.3IONS-SCNC: 13 MMOL/L (ref 4–13)
ANION GAP SERPL CALCULATED.3IONS-SCNC: 14 MMOL/L (ref 4–13)
ANION GAP SERPL CALCULATED.3IONS-SCNC: 16 MMOL/L (ref 4–13)
ANION GAP SERPL CALCULATED.3IONS-SCNC: 16 MMOL/L (ref 4–13)
ANION GAP SERPL CALCULATED.3IONS-SCNC: 8 MMOL/L (ref 4–13)
BUN SERPL-MCNC: 23 MG/DL (ref 5–25)
BUN SERPL-MCNC: 23 MG/DL (ref 5–25)
BUN SERPL-MCNC: 43 MG/DL (ref 5–25)
BUN SERPL-MCNC: 43 MG/DL (ref 5–25)
BUN SERPL-MCNC: 44 MG/DL (ref 5–25)
BUN SERPL-MCNC: 45 MG/DL (ref 5–25)
CALCIUM SERPL-MCNC: 8.6 MG/DL (ref 8.3–10.1)
CALCIUM SERPL-MCNC: 8.6 MG/DL (ref 8.3–10.1)
CALCIUM SERPL-MCNC: 9.2 MG/DL (ref 8.3–10.1)
CALCIUM SERPL-MCNC: 9.2 MG/DL (ref 8.3–10.1)
CALCIUM SERPL-MCNC: 9.3 MG/DL (ref 8.3–10.1)
CALCIUM SERPL-MCNC: 9.4 MG/DL (ref 8.3–10.1)
CHLORIDE SERPL-SCNC: 100 MMOL/L (ref 100–108)
CHLORIDE SERPL-SCNC: 100 MMOL/L (ref 100–108)
CHLORIDE SERPL-SCNC: 101 MMOL/L (ref 100–108)
CHLORIDE SERPL-SCNC: 101 MMOL/L (ref 100–108)
CHLORIDE SERPL-SCNC: 98 MMOL/L (ref 100–108)
CHLORIDE SERPL-SCNC: 99 MMOL/L (ref 100–108)
CO2 SERPL-SCNC: 22 MMOL/L (ref 21–32)
CO2 SERPL-SCNC: 22 MMOL/L (ref 21–32)
CO2 SERPL-SCNC: 23 MMOL/L (ref 21–32)
CO2 SERPL-SCNC: 23 MMOL/L (ref 21–32)
CO2 SERPL-SCNC: 25 MMOL/L (ref 21–32)
CO2 SERPL-SCNC: 28 MMOL/L (ref 21–32)
CREAT SERPL-MCNC: 12.32 MG/DL (ref 0.6–1.3)
CREAT SERPL-MCNC: 12.85 MG/DL (ref 0.6–1.3)
CREAT SERPL-MCNC: 13.06 MG/DL (ref 0.6–1.3)
CREAT SERPL-MCNC: 13.08 MG/DL (ref 0.6–1.3)
CREAT SERPL-MCNC: 6.68 MG/DL (ref 0.6–1.3)
CREAT SERPL-MCNC: 7.67 MG/DL (ref 0.6–1.3)
GFR SERPL CREATININE-BSD FRML MDRD: 10 ML/MIN/1.73SQ M
GFR SERPL CREATININE-BSD FRML MDRD: 4 ML/MIN/1.73SQ M
GFR SERPL CREATININE-BSD FRML MDRD: 5 ML/MIN/1.73SQ M
GFR SERPL CREATININE-BSD FRML MDRD: 8 ML/MIN/1.73SQ M
GLUCOSE SERPL-MCNC: 100 MG/DL (ref 65–140)
GLUCOSE SERPL-MCNC: 110 MG/DL (ref 65–140)
GLUCOSE SERPL-MCNC: 114 MG/DL (ref 65–140)
GLUCOSE SERPL-MCNC: 119 MG/DL (ref 65–140)
GLUCOSE SERPL-MCNC: 158 MG/DL (ref 65–140)
GLUCOSE SERPL-MCNC: 159 MG/DL (ref 65–140)
GLUCOSE SERPL-MCNC: 160 MG/DL (ref 65–140)
GLUCOSE SERPL-MCNC: 161 MG/DL (ref 65–140)
GLUCOSE SERPL-MCNC: 169 MG/DL (ref 65–140)
GLUCOSE SERPL-MCNC: 173 MG/DL (ref 65–140)
GLUCOSE SERPL-MCNC: 174 MG/DL (ref 65–140)
GLUCOSE SERPL-MCNC: 174 MG/DL (ref 65–140)
GLUCOSE SERPL-MCNC: 180 MG/DL (ref 65–140)
GLUCOSE SERPL-MCNC: 182 MG/DL (ref 65–140)
GLUCOSE SERPL-MCNC: 185 MG/DL (ref 65–140)
GLUCOSE SERPL-MCNC: 187 MG/DL (ref 65–140)
GLUCOSE SERPL-MCNC: 188 MG/DL (ref 65–140)
GLUCOSE SERPL-MCNC: 189 MG/DL (ref 65–140)
GLUCOSE SERPL-MCNC: 192 MG/DL (ref 65–140)
GLUCOSE SERPL-MCNC: 201 MG/DL (ref 65–140)
GLUCOSE SERPL-MCNC: 207 MG/DL (ref 65–140)
GLUCOSE SERPL-MCNC: 210 MG/DL (ref 65–140)
GLUCOSE SERPL-MCNC: 210 MG/DL (ref 65–140)
GLUCOSE SERPL-MCNC: 218 MG/DL (ref 65–140)
GLUCOSE SERPL-MCNC: 218 MG/DL (ref 65–140)
GLUCOSE SERPL-MCNC: 224 MG/DL (ref 65–140)
GLUCOSE SERPL-MCNC: 228 MG/DL (ref 65–140)
GLUCOSE SERPL-MCNC: 235 MG/DL (ref 65–140)
GLUCOSE SERPL-MCNC: 245 MG/DL (ref 65–140)
GLUCOSE SERPL-MCNC: 276 MG/DL (ref 65–140)
GLUCOSE SERPL-MCNC: 61 MG/DL (ref 65–140)
GLUCOSE SERPL-MCNC: 95 MG/DL (ref 65–140)
MAGNESIUM SERPL-MCNC: 2.1 MG/DL (ref 1.6–2.6)
MAGNESIUM SERPL-MCNC: 2.2 MG/DL (ref 1.6–2.6)
MAGNESIUM SERPL-MCNC: 2.8 MG/DL (ref 1.6–2.6)
MAGNESIUM SERPL-MCNC: 2.9 MG/DL (ref 1.6–2.6)
PHOSPHATE SERPL-MCNC: 3.1 MG/DL (ref 2.7–4.5)
PHOSPHATE SERPL-MCNC: 3.6 MG/DL (ref 2.7–4.5)
PHOSPHATE SERPL-MCNC: 6.9 MG/DL (ref 2.7–4.5)
PHOSPHATE SERPL-MCNC: 7.1 MG/DL (ref 2.7–4.5)
PHOSPHATE SERPL-MCNC: 7.1 MG/DL (ref 2.7–4.5)
PHOSPHATE SERPL-MCNC: 7.5 MG/DL (ref 2.7–4.5)
POTASSIUM SERPL-SCNC: 3.5 MMOL/L (ref 3.5–5.3)
POTASSIUM SERPL-SCNC: 3.7 MMOL/L (ref 3.5–5.3)
POTASSIUM SERPL-SCNC: 3.9 MMOL/L (ref 3.5–5.3)
POTASSIUM SERPL-SCNC: 4 MMOL/L (ref 3.5–5.3)
POTASSIUM SERPL-SCNC: 4.1 MMOL/L (ref 3.5–5.3)
POTASSIUM SERPL-SCNC: 4.3 MMOL/L (ref 3.5–5.3)
SODIUM SERPL-SCNC: 134 MMOL/L (ref 136–145)
SODIUM SERPL-SCNC: 136 MMOL/L (ref 136–145)
SODIUM SERPL-SCNC: 137 MMOL/L (ref 136–145)
SODIUM SERPL-SCNC: 137 MMOL/L (ref 136–145)
SODIUM SERPL-SCNC: 139 MMOL/L (ref 136–145)
SODIUM SERPL-SCNC: 139 MMOL/L (ref 136–145)

## 2020-11-23 PROCEDURE — 84100 ASSAY OF PHOSPHORUS: CPT | Performed by: NURSE PRACTITIONER

## 2020-11-23 PROCEDURE — 82948 REAGENT STRIP/BLOOD GLUCOSE: CPT

## 2020-11-23 PROCEDURE — 99232 SBSQ HOSP IP/OBS MODERATE 35: CPT | Performed by: INTERNAL MEDICINE

## 2020-11-23 PROCEDURE — 83735 ASSAY OF MAGNESIUM: CPT | Performed by: NURSE PRACTITIONER

## 2020-11-23 PROCEDURE — 80048 BASIC METABOLIC PNL TOTAL CA: CPT | Performed by: NURSE PRACTITIONER

## 2020-11-23 PROCEDURE — 5A1D70Z PERFORMANCE OF URINARY FILTRATION, INTERMITTENT, LESS THAN 6 HOURS PER DAY: ICD-10-PCS | Performed by: INTERNAL MEDICINE

## 2020-11-23 RX ORDER — POTASSIUM CHLORIDE 20 MEQ/1
40 TABLET, EXTENDED RELEASE ORAL ONCE
Status: COMPLETED | OUTPATIENT
Start: 2020-11-23 | End: 2020-11-23

## 2020-11-23 RX ORDER — DEXTROSE MONOHYDRATE 25 G/50ML
12.5 INJECTION, SOLUTION INTRAVENOUS ONCE
Status: COMPLETED | OUTPATIENT
Start: 2020-11-23 | End: 2020-11-23

## 2020-11-23 RX ORDER — POTASSIUM CHLORIDE 14.9 MG/ML
20 INJECTION INTRAVENOUS ONCE
Status: DISCONTINUED | OUTPATIENT
Start: 2020-11-23 | End: 2020-11-25 | Stop reason: HOSPADM

## 2020-11-23 RX ORDER — POTASSIUM CHLORIDE 20 MEQ/1
40 TABLET, EXTENDED RELEASE ORAL ONCE
Status: DISCONTINUED | OUTPATIENT
Start: 2020-11-23 | End: 2020-11-25 | Stop reason: HOSPADM

## 2020-11-23 RX ORDER — POTASSIUM CHLORIDE 20 MEQ/1
20 TABLET, EXTENDED RELEASE ORAL ONCE
Status: DISCONTINUED | OUTPATIENT
Start: 2020-11-23 | End: 2020-11-25 | Stop reason: HOSPADM

## 2020-11-23 RX ORDER — POTASSIUM CHLORIDE 14.9 MG/ML
20 INJECTION INTRAVENOUS ONCE
Status: COMPLETED | OUTPATIENT
Start: 2020-11-23 | End: 2020-11-23

## 2020-11-23 RX ORDER — POTASSIUM CHLORIDE 20 MEQ/1
20 TABLET, EXTENDED RELEASE ORAL ONCE
Status: COMPLETED | OUTPATIENT
Start: 2020-11-23 | End: 2020-11-23

## 2020-11-23 RX ORDER — POTASSIUM CHLORIDE 14.9 MG/ML
20 INJECTION INTRAVENOUS ONCE
Status: COMPLETED | OUTPATIENT
Start: 2020-11-23 | End: 2020-11-24

## 2020-11-23 RX ADMIN — DEXTROSE AND SODIUM CHLORIDE 100 ML/HR: 5; .45 INJECTION, SOLUTION INTRAVENOUS at 02:11

## 2020-11-23 RX ADMIN — LISINOPRIL 20 MG: 20 TABLET ORAL at 17:23

## 2020-11-23 RX ADMIN — POTASSIUM CHLORIDE 20 MEQ: 1500 TABLET, EXTENDED RELEASE ORAL at 17:36

## 2020-11-23 RX ADMIN — DOCUSATE SODIUM 100 MG: 100 CAPSULE ORAL at 08:31

## 2020-11-23 RX ADMIN — EPOETIN ALFA 4000 UNITS: 4000 SOLUTION INTRAVENOUS; SUBCUTANEOUS at 15:01

## 2020-11-23 RX ADMIN — HEPARIN SODIUM 5000 UNITS: 5000 INJECTION INTRAVENOUS; SUBCUTANEOUS at 21:12

## 2020-11-23 RX ADMIN — ONDANSETRON 4 MG: 2 INJECTION INTRAMUSCULAR; INTRAVENOUS at 15:29

## 2020-11-23 RX ADMIN — DEXTROSE AND SODIUM CHLORIDE 100 ML/HR: 5; .45 INJECTION, SOLUTION INTRAVENOUS at 21:25

## 2020-11-23 RX ADMIN — HEPARIN SODIUM 5000 UNITS: 5000 INJECTION INTRAVENOUS; SUBCUTANEOUS at 05:01

## 2020-11-23 RX ADMIN — DOXAZOSIN 2 MG: 1 TABLET ORAL at 21:13

## 2020-11-23 RX ADMIN — B-COMPLEX W/ C & FOLIC ACID TAB 1 TABLET: TAB at 10:21

## 2020-11-23 RX ADMIN — DOCUSATE SODIUM 100 MG: 100 CAPSULE ORAL at 17:23

## 2020-11-23 RX ADMIN — POTASSIUM CHLORIDE 40 MEQ: 1500 TABLET, EXTENDED RELEASE ORAL at 06:22

## 2020-11-23 RX ADMIN — ASPIRIN 81 MG CHEWABLE TABLET 81 MG: 81 TABLET CHEWABLE at 08:31

## 2020-11-23 RX ADMIN — HEPARIN SODIUM 5000 UNITS: 5000 INJECTION INTRAVENOUS; SUBCUTANEOUS at 15:11

## 2020-11-23 RX ADMIN — HYDRALAZINE HYDROCHLORIDE 25 MG: 25 TABLET, FILM COATED ORAL at 21:14

## 2020-11-23 RX ADMIN — GABAPENTIN 100 MG: 100 CAPSULE ORAL at 21:13

## 2020-11-23 RX ADMIN — DEXTROSE MONOHYDRATE 13 ML: 500 INJECTION PARENTERAL at 10:31

## 2020-11-23 RX ADMIN — NIFEDIPINE 60 MG: 30 TABLET, FILM COATED, EXTENDED RELEASE ORAL at 21:13

## 2020-11-23 RX ADMIN — Medication 250 MG: at 08:31

## 2020-11-23 RX ADMIN — LABETALOL HYDROCHLORIDE 300 MG: 200 TABLET, FILM COATED ORAL at 08:30

## 2020-11-23 RX ADMIN — ATORVASTATIN CALCIUM 40 MG: 40 TABLET, FILM COATED ORAL at 17:23

## 2020-11-23 RX ADMIN — TRIMETHOBENZAMIDE HYDROCHLORIDE 200 MG: 100 INJECTION INTRAMUSCULAR at 19:58

## 2020-11-23 RX ADMIN — FAMOTIDINE 40 MG: 20 TABLET ORAL at 08:30

## 2020-11-23 RX ADMIN — POTASSIUM CHLORIDE 40 MEQ: 1500 TABLET, EXTENDED RELEASE ORAL at 10:20

## 2020-11-23 RX ADMIN — POTASSIUM CHLORIDE 20 MEQ: 14.9 INJECTION, SOLUTION INTRAVENOUS at 21:16

## 2020-11-23 RX ADMIN — NIFEDIPINE 60 MG: 30 TABLET, FILM COATED, EXTENDED RELEASE ORAL at 08:30

## 2020-11-23 RX ADMIN — Medication 250 MG: at 17:36

## 2020-11-23 RX ADMIN — ONDANSETRON 4 MG: 2 INJECTION INTRAMUSCULAR; INTRAVENOUS at 08:48

## 2020-11-23 RX ADMIN — DEXTROSE AND SODIUM CHLORIDE 100 ML/HR: 5; .45 INJECTION, SOLUTION INTRAVENOUS at 12:08

## 2020-11-23 RX ADMIN — LABETALOL HYDROCHLORIDE 300 MG: 200 TABLET, FILM COATED ORAL at 21:13

## 2020-11-23 RX ADMIN — ESCITALOPRAM OXALATE 10 MG: 10 TABLET ORAL at 08:31

## 2020-11-24 ENCOUNTER — TELEPHONE (OUTPATIENT)
Dept: GASTROENTEROLOGY | Facility: AMBULARY SURGERY CENTER | Age: 35
End: 2020-11-24

## 2020-11-24 ENCOUNTER — APPOINTMENT (INPATIENT)
Dept: DIALYSIS | Facility: HOSPITAL | Age: 35
DRG: 907 | End: 2020-11-24
Payer: MEDICARE

## 2020-11-24 LAB
ANION GAP SERPL CALCULATED.3IONS-SCNC: 8 MMOL/L (ref 4–13)
ANION GAP SERPL CALCULATED.3IONS-SCNC: 8 MMOL/L (ref 4–13)
ANION GAP SERPL CALCULATED.3IONS-SCNC: 9 MMOL/L (ref 4–13)
BASOPHILS # BLD AUTO: 0.08 THOUSANDS/ΜL (ref 0–0.1)
BASOPHILS NFR BLD AUTO: 1 % (ref 0–1)
BUN SERPL-MCNC: 25 MG/DL (ref 5–25)
BUN SERPL-MCNC: 26 MG/DL (ref 5–25)
BUN SERPL-MCNC: 29 MG/DL (ref 5–25)
CALCIUM SERPL-MCNC: 8.3 MG/DL (ref 8.3–10.1)
CALCIUM SERPL-MCNC: 8.4 MG/DL (ref 8.3–10.1)
CALCIUM SERPL-MCNC: 8.8 MG/DL (ref 8.3–10.1)
CHLORIDE SERPL-SCNC: 100 MMOL/L (ref 100–108)
CHLORIDE SERPL-SCNC: 100 MMOL/L (ref 100–108)
CHLORIDE SERPL-SCNC: 101 MMOL/L (ref 100–108)
CO2 SERPL-SCNC: 26 MMOL/L (ref 21–32)
CO2 SERPL-SCNC: 26 MMOL/L (ref 21–32)
CO2 SERPL-SCNC: 27 MMOL/L (ref 21–32)
CREAT SERPL-MCNC: 8.16 MG/DL (ref 0.6–1.3)
CREAT SERPL-MCNC: 8.44 MG/DL (ref 0.6–1.3)
CREAT SERPL-MCNC: 9.17 MG/DL (ref 0.6–1.3)
EOSINOPHIL # BLD AUTO: 0.65 THOUSAND/ΜL (ref 0–0.61)
EOSINOPHIL NFR BLD AUTO: 11 % (ref 0–6)
ERYTHROCYTE [DISTWIDTH] IN BLOOD BY AUTOMATED COUNT: 14.4 % (ref 11.6–15.1)
GFR SERPL CREATININE-BSD FRML MDRD: 7 ML/MIN/1.73SQ M
GFR SERPL CREATININE-BSD FRML MDRD: 7 ML/MIN/1.73SQ M
GFR SERPL CREATININE-BSD FRML MDRD: 8 ML/MIN/1.73SQ M
GLUCOSE SERPL-MCNC: 100 MG/DL (ref 65–140)
GLUCOSE SERPL-MCNC: 100 MG/DL (ref 65–140)
GLUCOSE SERPL-MCNC: 105 MG/DL (ref 65–140)
GLUCOSE SERPL-MCNC: 108 MG/DL (ref 65–140)
GLUCOSE SERPL-MCNC: 111 MG/DL (ref 65–140)
GLUCOSE SERPL-MCNC: 114 MG/DL (ref 65–140)
GLUCOSE SERPL-MCNC: 115 MG/DL (ref 65–140)
GLUCOSE SERPL-MCNC: 116 MG/DL (ref 65–140)
GLUCOSE SERPL-MCNC: 117 MG/DL (ref 65–140)
GLUCOSE SERPL-MCNC: 139 MG/DL (ref 65–140)
GLUCOSE SERPL-MCNC: 151 MG/DL (ref 65–140)
GLUCOSE SERPL-MCNC: 158 MG/DL (ref 65–140)
GLUCOSE SERPL-MCNC: 159 MG/DL (ref 65–140)
GLUCOSE SERPL-MCNC: 160 MG/DL (ref 65–140)
GLUCOSE SERPL-MCNC: 163 MG/DL (ref 65–140)
GLUCOSE SERPL-MCNC: 173 MG/DL (ref 65–140)
GLUCOSE SERPL-MCNC: 176 MG/DL (ref 65–140)
GLUCOSE SERPL-MCNC: 80 MG/DL (ref 65–140)
HCT VFR BLD AUTO: 24.9 % (ref 36.5–49.3)
HGB BLD-MCNC: 7.9 G/DL (ref 12–17)
IMM GRANULOCYTES # BLD AUTO: 0.09 THOUSAND/UL (ref 0–0.2)
IMM GRANULOCYTES NFR BLD AUTO: 2 % (ref 0–2)
LYMPHOCYTES # BLD AUTO: 0.82 THOUSANDS/ΜL (ref 0.6–4.47)
LYMPHOCYTES NFR BLD AUTO: 14 % (ref 14–44)
MAGNESIUM SERPL-MCNC: 2.2 MG/DL (ref 1.6–2.6)
MAGNESIUM SERPL-MCNC: 2.2 MG/DL (ref 1.6–2.6)
MAGNESIUM SERPL-MCNC: 2.3 MG/DL (ref 1.6–2.6)
MCH RBC QN AUTO: 30.2 PG (ref 26.8–34.3)
MCHC RBC AUTO-ENTMCNC: 31.7 G/DL (ref 31.4–37.4)
MCV RBC AUTO: 95 FL (ref 82–98)
MISCELLANEOUS LAB TEST RESULT: NORMAL
MONOCYTES # BLD AUTO: 0.42 THOUSAND/ΜL (ref 0.17–1.22)
MONOCYTES NFR BLD AUTO: 7 % (ref 4–12)
NEUTROPHILS # BLD AUTO: 3.93 THOUSANDS/ΜL (ref 1.85–7.62)
NEUTS SEG NFR BLD AUTO: 65 % (ref 43–75)
NRBC BLD AUTO-RTO: 0 /100 WBCS
PHOSPHATE SERPL-MCNC: 4.1 MG/DL (ref 2.7–4.5)
PHOSPHATE SERPL-MCNC: 4.3 MG/DL (ref 2.7–4.5)
PHOSPHATE SERPL-MCNC: 4.8 MG/DL (ref 2.7–4.5)
PLATELET # BLD AUTO: 311 THOUSANDS/UL (ref 149–390)
PMV BLD AUTO: 9.5 FL (ref 8.9–12.7)
POTASSIUM SERPL-SCNC: 4 MMOL/L (ref 3.5–5.3)
POTASSIUM SERPL-SCNC: 4.2 MMOL/L (ref 3.5–5.3)
POTASSIUM SERPL-SCNC: 4.4 MMOL/L (ref 3.5–5.3)
RBC # BLD AUTO: 2.62 MILLION/UL (ref 3.88–5.62)
SODIUM SERPL-SCNC: 134 MMOL/L (ref 136–145)
SODIUM SERPL-SCNC: 135 MMOL/L (ref 136–145)
SODIUM SERPL-SCNC: 136 MMOL/L (ref 136–145)
WBC # BLD AUTO: 5.99 THOUSAND/UL (ref 4.31–10.16)

## 2020-11-24 PROCEDURE — 82948 REAGENT STRIP/BLOOD GLUCOSE: CPT

## 2020-11-24 PROCEDURE — 80048 BASIC METABOLIC PNL TOTAL CA: CPT | Performed by: NURSE PRACTITIONER

## 2020-11-24 PROCEDURE — 90935 HEMODIALYSIS ONE EVALUATION: CPT | Performed by: INTERNAL MEDICINE

## 2020-11-24 PROCEDURE — 85025 COMPLETE CBC W/AUTO DIFF WBC: CPT | Performed by: NURSE PRACTITIONER

## 2020-11-24 PROCEDURE — 84100 ASSAY OF PHOSPHORUS: CPT | Performed by: NURSE PRACTITIONER

## 2020-11-24 PROCEDURE — 83735 ASSAY OF MAGNESIUM: CPT | Performed by: NURSE PRACTITIONER

## 2020-11-24 PROCEDURE — 99232 SBSQ HOSP IP/OBS MODERATE 35: CPT | Performed by: INTERNAL MEDICINE

## 2020-11-24 PROCEDURE — 99233 SBSQ HOSP IP/OBS HIGH 50: CPT | Performed by: INTERNAL MEDICINE

## 2020-11-24 PROCEDURE — NC001 PR NO CHARGE: Performed by: INTERNAL MEDICINE

## 2020-11-24 RX ORDER — INSULIN GLARGINE 100 [IU]/ML
10 INJECTION, SOLUTION SUBCUTANEOUS
Status: COMPLETED | OUTPATIENT
Start: 2020-11-24 | End: 2020-11-24

## 2020-11-24 RX ORDER — INSULIN GLARGINE 100 [IU]/ML
15 INJECTION, SOLUTION SUBCUTANEOUS
Status: DISCONTINUED | OUTPATIENT
Start: 2020-11-25 | End: 2020-11-25 | Stop reason: HOSPADM

## 2020-11-24 RX ORDER — INSULIN GLARGINE 100 [IU]/ML
6 INJECTION, SOLUTION SUBCUTANEOUS
Status: DISCONTINUED | OUTPATIENT
Start: 2020-11-24 | End: 2020-11-24

## 2020-11-24 RX ADMIN — INSULIN GLARGINE 6 UNITS: 100 INJECTION, SOLUTION SUBCUTANEOUS at 09:25

## 2020-11-24 RX ADMIN — ONDANSETRON 4 MG: 2 INJECTION INTRAMUSCULAR; INTRAVENOUS at 08:35

## 2020-11-24 RX ADMIN — ERYTHROMYCIN 250 MG: 250 TABLET, FILM COATED ORAL at 14:14

## 2020-11-24 RX ADMIN — ATORVASTATIN CALCIUM 40 MG: 40 TABLET, FILM COATED ORAL at 17:56

## 2020-11-24 RX ADMIN — LABETALOL HYDROCHLORIDE 300 MG: 200 TABLET, FILM COATED ORAL at 21:16

## 2020-11-24 RX ADMIN — EPOETIN ALFA 4000 UNITS: 4000 SOLUTION INTRAVENOUS; SUBCUTANEOUS at 14:21

## 2020-11-24 RX ADMIN — SEVELAMER HYDROCHLORIDE 2400 MG: 800 TABLET, FILM COATED PARENTERAL at 17:57

## 2020-11-24 RX ADMIN — SEVELAMER HYDROCHLORIDE 2400 MG: 800 TABLET, FILM COATED PARENTERAL at 14:15

## 2020-11-24 RX ADMIN — GABAPENTIN 100 MG: 100 CAPSULE ORAL at 21:16

## 2020-11-24 RX ADMIN — HEPARIN SODIUM 5000 UNITS: 5000 INJECTION INTRAVENOUS; SUBCUTANEOUS at 21:16

## 2020-11-24 RX ADMIN — ERYTHROMYCIN 250 MG: 250 TABLET, FILM COATED ORAL at 17:57

## 2020-11-24 RX ADMIN — ONDANSETRON 4 MG: 2 INJECTION INTRAMUSCULAR; INTRAVENOUS at 14:15

## 2020-11-24 RX ADMIN — HYDRALAZINE HYDROCHLORIDE 25 MG: 25 TABLET, FILM COATED ORAL at 21:16

## 2020-11-24 RX ADMIN — ASPIRIN 81 MG CHEWABLE TABLET 81 MG: 81 TABLET CHEWABLE at 08:35

## 2020-11-24 RX ADMIN — Medication 250 MG: at 08:35

## 2020-11-24 RX ADMIN — INSULIN LISPRO 4 UNITS: 100 INJECTION, SOLUTION INTRAVENOUS; SUBCUTANEOUS at 08:44

## 2020-11-24 RX ADMIN — ESCITALOPRAM OXALATE 10 MG: 10 TABLET ORAL at 08:35

## 2020-11-24 RX ADMIN — NIFEDIPINE 60 MG: 30 TABLET, FILM COATED, EXTENDED RELEASE ORAL at 08:35

## 2020-11-24 RX ADMIN — ONDANSETRON 4 MG: 2 INJECTION INTRAMUSCULAR; INTRAVENOUS at 08:45

## 2020-11-24 RX ADMIN — INSULIN LISPRO 2 UNITS: 100 INJECTION, SOLUTION INTRAVENOUS; SUBCUTANEOUS at 14:16

## 2020-11-24 RX ADMIN — HEPARIN SODIUM 5000 UNITS: 5000 INJECTION INTRAVENOUS; SUBCUTANEOUS at 14:15

## 2020-11-24 RX ADMIN — HEPARIN SODIUM 5000 UNITS: 5000 INJECTION INTRAVENOUS; SUBCUTANEOUS at 06:13

## 2020-11-24 RX ADMIN — LISINOPRIL 20 MG: 20 TABLET ORAL at 17:56

## 2020-11-24 RX ADMIN — NIFEDIPINE 60 MG: 30 TABLET, FILM COATED, EXTENDED RELEASE ORAL at 21:16

## 2020-11-24 RX ADMIN — SEVELAMER HYDROCHLORIDE 2400 MG: 800 TABLET, FILM COATED PARENTERAL at 08:36

## 2020-11-24 RX ADMIN — DOCUSATE SODIUM 100 MG: 100 CAPSULE ORAL at 08:35

## 2020-11-24 RX ADMIN — DOCUSATE SODIUM 100 MG: 100 CAPSULE ORAL at 17:56

## 2020-11-24 RX ADMIN — INSULIN GLARGINE 10 UNITS: 100 INJECTION, SOLUTION SUBCUTANEOUS at 21:16

## 2020-11-24 RX ADMIN — LABETALOL HYDROCHLORIDE 300 MG: 200 TABLET, FILM COATED ORAL at 08:35

## 2020-11-24 RX ADMIN — HYDRALAZINE HYDROCHLORIDE 10 MG: 20 INJECTION INTRAMUSCULAR; INTRAVENOUS at 02:57

## 2020-11-24 RX ADMIN — FAMOTIDINE 40 MG: 20 TABLET ORAL at 08:35

## 2020-11-24 RX ADMIN — ONDANSETRON 4 MG: 2 INJECTION INTRAMUSCULAR; INTRAVENOUS at 21:20

## 2020-11-24 RX ADMIN — Medication 250 MG: at 17:56

## 2020-11-24 RX ADMIN — ERYTHROMYCIN 250 MG: 250 TABLET, FILM COATED ORAL at 21:17

## 2020-11-24 RX ADMIN — DOXAZOSIN 2 MG: 1 TABLET ORAL at 21:16

## 2020-11-24 RX ADMIN — CINACALCET 60 MG: 30 TABLET, FILM COATED ORAL at 08:36

## 2020-11-25 VITALS
TEMPERATURE: 98.1 F | OXYGEN SATURATION: 99 % | RESPIRATION RATE: 20 BRPM | BODY MASS INDEX: 30.52 KG/M2 | SYSTOLIC BLOOD PRESSURE: 178 MMHG | WEIGHT: 218 LBS | HEART RATE: 88 BPM | HEIGHT: 71 IN | DIASTOLIC BLOOD PRESSURE: 87 MMHG

## 2020-11-25 LAB
ANION GAP SERPL CALCULATED.3IONS-SCNC: 10 MMOL/L (ref 4–13)
BUN SERPL-MCNC: 19 MG/DL (ref 5–25)
CALCIUM SERPL-MCNC: 8.2 MG/DL (ref 8.3–10.1)
CHLORIDE SERPL-SCNC: 98 MMOL/L (ref 100–108)
CO2 SERPL-SCNC: 26 MMOL/L (ref 21–32)
CREAT SERPL-MCNC: 7 MG/DL (ref 0.6–1.3)
ERYTHROCYTE [DISTWIDTH] IN BLOOD BY AUTOMATED COUNT: 14.5 % (ref 11.6–15.1)
GFR SERPL CREATININE-BSD FRML MDRD: 9 ML/MIN/1.73SQ M
GLUCOSE SERPL-MCNC: 153 MG/DL (ref 65–140)
GLUCOSE SERPL-MCNC: 163 MG/DL (ref 65–140)
GLUCOSE SERPL-MCNC: 90 MG/DL (ref 65–140)
HCT VFR BLD AUTO: 25.2 % (ref 36.5–49.3)
HGB BLD-MCNC: 8 G/DL (ref 12–17)
MAGNESIUM SERPL-MCNC: 2.1 MG/DL (ref 1.6–2.6)
MCH RBC QN AUTO: 30.2 PG (ref 26.8–34.3)
MCHC RBC AUTO-ENTMCNC: 31.7 G/DL (ref 31.4–37.4)
MCV RBC AUTO: 95 FL (ref 82–98)
PHOSPHATE SERPL-MCNC: 4.2 MG/DL (ref 2.7–4.5)
PLATELET # BLD AUTO: 328 THOUSANDS/UL (ref 149–390)
PMV BLD AUTO: 9.5 FL (ref 8.9–12.7)
POTASSIUM SERPL-SCNC: 4.4 MMOL/L (ref 3.5–5.3)
RBC # BLD AUTO: 2.65 MILLION/UL (ref 3.88–5.62)
SODIUM SERPL-SCNC: 134 MMOL/L (ref 136–145)
WBC # BLD AUTO: 4.73 THOUSAND/UL (ref 4.31–10.16)

## 2020-11-25 PROCEDURE — 84100 ASSAY OF PHOSPHORUS: CPT | Performed by: NURSE PRACTITIONER

## 2020-11-25 PROCEDURE — 85027 COMPLETE CBC AUTOMATED: CPT | Performed by: NURSE PRACTITIONER

## 2020-11-25 PROCEDURE — 83735 ASSAY OF MAGNESIUM: CPT | Performed by: NURSE PRACTITIONER

## 2020-11-25 PROCEDURE — 80048 BASIC METABOLIC PNL TOTAL CA: CPT | Performed by: NURSE PRACTITIONER

## 2020-11-25 PROCEDURE — 82948 REAGENT STRIP/BLOOD GLUCOSE: CPT

## 2020-11-25 PROCEDURE — 99232 SBSQ HOSP IP/OBS MODERATE 35: CPT | Performed by: INTERNAL MEDICINE

## 2020-11-25 PROCEDURE — 99239 HOSP IP/OBS DSCHRG MGMT >30: CPT | Performed by: INTERNAL MEDICINE

## 2020-11-25 RX ORDER — SEVELAMER HYDROCHLORIDE 800 MG/1
2400 TABLET, FILM COATED ORAL
Qty: 270 TABLET | Refills: 0 | Status: SHIPPED | OUTPATIENT
Start: 2020-11-25 | End: 2021-08-16

## 2020-11-25 RX ORDER — HYDRALAZINE HYDROCHLORIDE 25 MG/1
25 TABLET, FILM COATED ORAL
Qty: 30 TABLET | Refills: 0 | Status: SHIPPED | OUTPATIENT
Start: 2020-11-25 | End: 2020-12-03 | Stop reason: SDUPTHER

## 2020-11-25 RX ORDER — HUMAN INSULIN 100 [IU]/ML
4 INJECTION, SUSPENSION SUBCUTANEOUS 3 TIMES DAILY
Qty: 10 ML | Refills: 0 | Status: SHIPPED | OUTPATIENT
Start: 2020-11-25 | End: 2020-12-17 | Stop reason: HOSPADM

## 2020-11-25 RX ORDER — INSULIN GLARGINE 100 [IU]/ML
15 INJECTION, SOLUTION SUBCUTANEOUS
Qty: 5 PEN | Refills: 0 | Status: ON HOLD | OUTPATIENT
Start: 2020-11-25 | End: 2020-12-17 | Stop reason: SDUPTHER

## 2020-11-25 RX ORDER — INSULIN GLARGINE 100 [IU]/ML
15 INJECTION, SOLUTION SUBCUTANEOUS
Qty: 10 ML | Refills: 0 | Status: SHIPPED | OUTPATIENT
Start: 2020-11-25 | End: 2020-12-03

## 2020-11-25 RX ORDER — CINACALCET 60 MG/1
60 TABLET, FILM COATED ORAL EVERY OTHER DAY
Qty: 30 TABLET | Refills: 0 | Status: SHIPPED | OUTPATIENT
Start: 2020-11-26 | End: 2021-02-16

## 2020-11-25 RX ORDER — ERYTHROMYCIN 250 MG/1
250 TABLET, COATED ORAL
Qty: 120 TABLET | Refills: 0 | Status: SHIPPED | OUTPATIENT
Start: 2020-11-25 | End: 2020-12-17 | Stop reason: HOSPADM

## 2020-11-25 RX ADMIN — FAMOTIDINE 40 MG: 20 TABLET ORAL at 08:05

## 2020-11-25 RX ADMIN — NIFEDIPINE 60 MG: 30 TABLET, FILM COATED, EXTENDED RELEASE ORAL at 08:05

## 2020-11-25 RX ADMIN — ERYTHROMYCIN 250 MG: 250 TABLET, FILM COATED ORAL at 08:06

## 2020-11-25 RX ADMIN — HEPARIN SODIUM 5000 UNITS: 5000 INJECTION INTRAVENOUS; SUBCUTANEOUS at 05:33

## 2020-11-25 RX ADMIN — ASPIRIN 81 MG CHEWABLE TABLET 81 MG: 81 TABLET CHEWABLE at 08:04

## 2020-11-25 RX ADMIN — ESCITALOPRAM OXALATE 10 MG: 10 TABLET ORAL at 08:05

## 2020-11-25 RX ADMIN — B-COMPLEX W/ C & FOLIC ACID TAB 1 TABLET: TAB at 12:39

## 2020-11-25 RX ADMIN — SEVELAMER HYDROCHLORIDE 2400 MG: 800 TABLET, FILM COATED PARENTERAL at 12:40

## 2020-11-25 RX ADMIN — ERYTHROMYCIN 250 MG: 250 TABLET, FILM COATED ORAL at 12:40

## 2020-11-25 RX ADMIN — INSULIN LISPRO 1 UNITS: 100 INJECTION, SOLUTION INTRAVENOUS; SUBCUTANEOUS at 08:07

## 2020-11-25 RX ADMIN — Medication 250 MG: at 08:04

## 2020-11-25 RX ADMIN — LABETALOL HYDROCHLORIDE 300 MG: 200 TABLET, FILM COATED ORAL at 08:04

## 2020-11-25 RX ADMIN — SEVELAMER HYDROCHLORIDE 2400 MG: 800 TABLET, FILM COATED PARENTERAL at 08:11

## 2020-11-27 ENCOUNTER — TRANSITIONAL CARE MANAGEMENT (OUTPATIENT)
Dept: INTERNAL MEDICINE CLINIC | Facility: CLINIC | Age: 35
End: 2020-11-27

## 2020-11-27 ENCOUNTER — NURSE TRIAGE (OUTPATIENT)
Dept: OTHER | Facility: OTHER | Age: 35
End: 2020-11-27

## 2020-11-27 LAB
BACTERIA SPEC BFLD CULT: NO GROWTH
GRAM STN SPEC: NORMAL
GRAM STN SPEC: NORMAL

## 2020-11-29 LAB
BACTERIA SPEC BFLD CULT: NO GROWTH
GRAM STN SPEC: NORMAL

## 2020-11-30 ENCOUNTER — SOCIAL WORK (OUTPATIENT)
Dept: BEHAVIORAL/MENTAL HEALTH CLINIC | Facility: CLINIC | Age: 35
End: 2020-11-30
Payer: MEDICARE

## 2020-11-30 DIAGNOSIS — F32.1 CURRENT MODERATE EPISODE OF MAJOR DEPRESSIVE DISORDER WITHOUT PRIOR EPISODE (HCC): ICD-10-CM

## 2020-11-30 DIAGNOSIS — R91.1 PULMONARY NODULE: Primary | ICD-10-CM

## 2020-11-30 PROCEDURE — 90834 PSYTX W PT 45 MINUTES: CPT | Performed by: SOCIAL WORKER

## 2020-12-02 ENCOUNTER — TELEPHONE (OUTPATIENT)
Dept: VASCULAR SURGERY | Facility: CLINIC | Age: 35
End: 2020-12-02

## 2020-12-03 ENCOUNTER — OFFICE VISIT (OUTPATIENT)
Dept: VASCULAR SURGERY | Facility: CLINIC | Age: 35
End: 2020-12-03

## 2020-12-03 ENCOUNTER — OFFICE VISIT (OUTPATIENT)
Dept: INTERNAL MEDICINE CLINIC | Facility: CLINIC | Age: 35
End: 2020-12-03
Payer: MEDICARE

## 2020-12-03 VITALS
SYSTOLIC BLOOD PRESSURE: 182 MMHG | DIASTOLIC BLOOD PRESSURE: 90 MMHG | HEIGHT: 71 IN | TEMPERATURE: 98.7 F | BODY MASS INDEX: 30.66 KG/M2 | WEIGHT: 219 LBS | HEART RATE: 84 BPM

## 2020-12-03 VITALS
BODY MASS INDEX: 30.41 KG/M2 | WEIGHT: 217.2 LBS | DIASTOLIC BLOOD PRESSURE: 70 MMHG | RESPIRATION RATE: 16 BRPM | HEIGHT: 71 IN | HEART RATE: 90 BPM | SYSTOLIC BLOOD PRESSURE: 154 MMHG | TEMPERATURE: 99.2 F | OXYGEN SATURATION: 99 %

## 2020-12-03 DIAGNOSIS — Z99.2 END STAGE RENAL DISEASE ON DIALYSIS DUE TO TYPE 1 DIABETES MELLITUS (HCC): Primary | ICD-10-CM

## 2020-12-03 DIAGNOSIS — I15.0 RENOVASCULAR HYPERTENSION: ICD-10-CM

## 2020-12-03 DIAGNOSIS — N18.6 END STAGE RENAL DISEASE ON DIALYSIS DUE TO TYPE 1 DIABETES MELLITUS (HCC): Primary | ICD-10-CM

## 2020-12-03 DIAGNOSIS — K65.9: Primary | ICD-10-CM

## 2020-12-03 DIAGNOSIS — E10.22 END STAGE RENAL DISEASE ON DIALYSIS DUE TO TYPE 1 DIABETES MELLITUS (HCC): Primary | ICD-10-CM

## 2020-12-03 DIAGNOSIS — E10.10 TYPE 1 DIABETES MELLITUS WITH KETOACIDOSIS WITHOUT COMA (HCC): ICD-10-CM

## 2020-12-03 DIAGNOSIS — G62.9 PERIPHERAL POLYNEUROPATHY: ICD-10-CM

## 2020-12-03 DIAGNOSIS — F32.1 CURRENT MODERATE EPISODE OF MAJOR DEPRESSIVE DISORDER WITHOUT PRIOR EPISODE (HCC): ICD-10-CM

## 2020-12-03 PROBLEM — R45.851 VERBALIZES SUICIDAL THOUGHTS: Status: RESOLVED | Noted: 2020-09-08 | Resolved: 2020-12-03

## 2020-12-03 PROCEDURE — 99024 POSTOP FOLLOW-UP VISIT: CPT | Performed by: SURGERY

## 2020-12-03 PROCEDURE — 99215 OFFICE O/P EST HI 40 MIN: CPT | Performed by: INTERNAL MEDICINE

## 2020-12-03 RX ORDER — METOLAZONE 5 MG/1
TABLET ORAL
COMMUNITY
Start: 2020-12-01 | End: 2020-12-17 | Stop reason: HOSPADM

## 2020-12-03 RX ORDER — HYDRALAZINE HYDROCHLORIDE 50 MG/1
TABLET, FILM COATED ORAL
COMMUNITY
Start: 2020-11-16 | End: 2020-12-03 | Stop reason: SDUPTHER

## 2020-12-03 RX ORDER — POLYETHYLENE GLYCOL 3350, SODIUM SULFATE ANHYDROUS, SODIUM BICARBONATE, SODIUM CHLORIDE, POTASSIUM CHLORIDE 236; 22.74; 6.74; 5.86; 2.97 G/4L; G/4L; G/4L; G/4L; G/4L
POWDER, FOR SOLUTION ORAL
COMMUNITY
End: 2020-12-17 | Stop reason: HOSPADM

## 2020-12-03 RX ORDER — SUCROFERRIC OXYHYDROXIDE 500 MG/1
TABLET, CHEWABLE ORAL
COMMUNITY
Start: 2020-11-17 | End: 2021-02-16

## 2020-12-03 RX ORDER — TORSEMIDE 100 MG/1
100 TABLET ORAL DAILY
COMMUNITY
End: 2020-12-17 | Stop reason: HOSPADM

## 2020-12-03 RX ORDER — IBUPROFEN 600 MG/1
TABLET ORAL
COMMUNITY
End: 2020-12-17 | Stop reason: HOSPADM

## 2020-12-07 DIAGNOSIS — E11.43 GASTROPARESIS DIABETICORUM (HCC): ICD-10-CM

## 2020-12-07 DIAGNOSIS — K31.84 GASTROPARESIS DIABETICORUM (HCC): ICD-10-CM

## 2020-12-07 RX ORDER — FAMOTIDINE 40 MG/1
40 TABLET, FILM COATED ORAL 2 TIMES DAILY
Qty: 60 TABLET | Refills: 5 | Status: SHIPPED | OUTPATIENT
Start: 2020-12-07 | End: 2021-10-25 | Stop reason: SDUPTHER

## 2020-12-09 ENCOUNTER — TELEPHONE (OUTPATIENT)
Dept: PSYCHIATRY | Facility: CLINIC | Age: 35
End: 2020-12-09

## 2020-12-14 ENCOUNTER — HOSPITAL ENCOUNTER (INPATIENT)
Facility: HOSPITAL | Age: 35
LOS: 2 days | Discharge: HOME | DRG: 304 | End: 2020-12-17
Attending: EMERGENCY MEDICINE | Admitting: INTERNAL MEDICINE
Payer: MEDICARE

## 2020-12-14 DIAGNOSIS — E10.10: ICD-10-CM

## 2020-12-14 DIAGNOSIS — N18.6 ESRD (END STAGE RENAL DISEASE) (HCC): ICD-10-CM

## 2020-12-14 DIAGNOSIS — K31.84 GASTROPARESIS: ICD-10-CM

## 2020-12-14 DIAGNOSIS — I16.0 HYPERTENSIVE URGENCY: Primary | ICD-10-CM

## 2020-12-14 DIAGNOSIS — I15.0 RENOVASCULAR HYPERTENSION: ICD-10-CM

## 2020-12-14 LAB
ALBUMIN SERPL BCP-MCNC: 2.7 G/DL (ref 3.5–5)
ALP SERPL-CCNC: 80 U/L (ref 46–116)
ALT SERPL W P-5'-P-CCNC: 13 U/L (ref 12–78)
ANION GAP SERPL CALCULATED.3IONS-SCNC: 9 MMOL/L (ref 4–13)
AST SERPL W P-5'-P-CCNC: 11 U/L (ref 5–45)
BASOPHILS # BLD AUTO: 0.07 THOUSANDS/ΜL (ref 0–0.1)
BASOPHILS NFR BLD AUTO: 1 % (ref 0–1)
BILIRUB SERPL-MCNC: 0.48 MG/DL (ref 0.2–1)
BUN SERPL-MCNC: 18 MG/DL (ref 5–25)
CALCIUM ALBUM COR SERPL-MCNC: 9.4 MG/DL (ref 8.3–10.1)
CALCIUM SERPL-MCNC: 8.4 MG/DL (ref 8.3–10.1)
CHLORIDE SERPL-SCNC: 102 MMOL/L (ref 100–108)
CO2 SERPL-SCNC: 29 MMOL/L (ref 21–32)
CREAT SERPL-MCNC: 7.33 MG/DL (ref 0.6–1.3)
EOSINOPHIL # BLD AUTO: 0.72 THOUSAND/ΜL (ref 0–0.61)
EOSINOPHIL NFR BLD AUTO: 12 % (ref 0–6)
ERYTHROCYTE [DISTWIDTH] IN BLOOD BY AUTOMATED COUNT: 15.1 % (ref 11.6–15.1)
GFR SERPL CREATININE-BSD FRML MDRD: 9 ML/MIN/1.73SQ M
GLUCOSE SERPL-MCNC: 202 MG/DL (ref 65–140)
HCT VFR BLD AUTO: 24.4 % (ref 36.5–49.3)
HGB BLD-MCNC: 7.9 G/DL (ref 12–17)
IMM GRANULOCYTES # BLD AUTO: 0.01 THOUSAND/UL (ref 0–0.2)
IMM GRANULOCYTES NFR BLD AUTO: 0 % (ref 0–2)
LYMPHOCYTES # BLD AUTO: 1.02 THOUSANDS/ΜL (ref 0.6–4.47)
LYMPHOCYTES NFR BLD AUTO: 17 % (ref 14–44)
MCH RBC QN AUTO: 30.7 PG (ref 26.8–34.3)
MCHC RBC AUTO-ENTMCNC: 32.4 G/DL (ref 31.4–37.4)
MCV RBC AUTO: 95 FL (ref 82–98)
MONOCYTES # BLD AUTO: 0.33 THOUSAND/ΜL (ref 0.17–1.22)
MONOCYTES NFR BLD AUTO: 6 % (ref 4–12)
NEUTROPHILS # BLD AUTO: 3.88 THOUSANDS/ΜL (ref 1.85–7.62)
NEUTS SEG NFR BLD AUTO: 64 % (ref 43–75)
NRBC BLD AUTO-RTO: 0 /100 WBCS
PLATELET # BLD AUTO: 290 THOUSANDS/UL (ref 149–390)
PMV BLD AUTO: 10 FL (ref 8.9–12.7)
POTASSIUM SERPL-SCNC: 4.6 MMOL/L (ref 3.5–5.3)
PROT SERPL-MCNC: 5.5 G/DL (ref 6.4–8.2)
RBC # BLD AUTO: 2.57 MILLION/UL (ref 3.88–5.62)
SODIUM SERPL-SCNC: 140 MMOL/L (ref 136–145)
WBC # BLD AUTO: 6.03 THOUSAND/UL (ref 4.31–10.16)

## 2020-12-14 PROCEDURE — 85025 COMPLETE CBC W/AUTO DIFF WBC: CPT | Performed by: EMERGENCY MEDICINE

## 2020-12-14 PROCEDURE — 99285 EMERGENCY DEPT VISIT HI MDM: CPT

## 2020-12-14 PROCEDURE — 93005 ELECTROCARDIOGRAM TRACING: CPT

## 2020-12-14 PROCEDURE — 96376 TX/PRO/DX INJ SAME DRUG ADON: CPT

## 2020-12-14 PROCEDURE — 96375 TX/PRO/DX INJ NEW DRUG ADDON: CPT

## 2020-12-14 PROCEDURE — 80053 COMPREHEN METABOLIC PANEL: CPT | Performed by: EMERGENCY MEDICINE

## 2020-12-14 PROCEDURE — 36415 COLL VENOUS BLD VENIPUNCTURE: CPT | Performed by: EMERGENCY MEDICINE

## 2020-12-14 PROCEDURE — 96374 THER/PROPH/DIAG INJ IV PUSH: CPT

## 2020-12-14 PROCEDURE — 99291 CRITICAL CARE FIRST HOUR: CPT | Performed by: EMERGENCY MEDICINE

## 2020-12-14 RX ORDER — METOCLOPRAMIDE HYDROCHLORIDE 5 MG/ML
10 INJECTION INTRAMUSCULAR; INTRAVENOUS ONCE
Status: COMPLETED | OUTPATIENT
Start: 2020-12-14 | End: 2020-12-15

## 2020-12-14 RX ORDER — HYDRALAZINE HYDROCHLORIDE 20 MG/ML
20 INJECTION INTRAMUSCULAR; INTRAVENOUS ONCE
Status: COMPLETED | OUTPATIENT
Start: 2020-12-14 | End: 2020-12-14

## 2020-12-14 RX ORDER — LABETALOL 20 MG/4 ML (5 MG/ML) INTRAVENOUS SYRINGE
10 ONCE
Status: COMPLETED | OUTPATIENT
Start: 2020-12-14 | End: 2020-12-14

## 2020-12-14 RX ORDER — ONDANSETRON 2 MG/ML
4 INJECTION INTRAMUSCULAR; INTRAVENOUS ONCE
Status: COMPLETED | OUTPATIENT
Start: 2020-12-14 | End: 2020-12-14

## 2020-12-14 RX ORDER — HYDRALAZINE HYDROCHLORIDE 20 MG/ML
10 INJECTION INTRAMUSCULAR; INTRAVENOUS ONCE
Status: COMPLETED | OUTPATIENT
Start: 2020-12-14 | End: 2020-12-14

## 2020-12-14 RX ADMIN — HYDRALAZINE HYDROCHLORIDE 10 MG: 20 INJECTION INTRAMUSCULAR; INTRAVENOUS at 22:31

## 2020-12-14 RX ADMIN — LABETALOL 20 MG/4 ML (5 MG/ML) INTRAVENOUS SYRINGE 10 MG: at 23:56

## 2020-12-14 RX ADMIN — HYDRALAZINE HYDROCHLORIDE 20 MG: 20 INJECTION INTRAMUSCULAR; INTRAVENOUS at 23:07

## 2020-12-14 RX ADMIN — ONDANSETRON 4 MG: 2 INJECTION INTRAMUSCULAR; INTRAVENOUS at 22:34

## 2020-12-14 RX ADMIN — LABETALOL 20 MG/4 ML (5 MG/ML) INTRAVENOUS SYRINGE 10 MG: at 23:06

## 2020-12-14 RX ADMIN — HYDRALAZINE HYDROCHLORIDE 20 MG: 20 INJECTION INTRAMUSCULAR; INTRAVENOUS at 23:47

## 2020-12-15 ENCOUNTER — APPOINTMENT (INPATIENT)
Dept: DIALYSIS | Facility: HOSPITAL | Age: 35
DRG: 304 | End: 2020-12-15
Payer: MEDICARE

## 2020-12-15 ENCOUNTER — APPOINTMENT (INPATIENT)
Dept: RADIOLOGY | Facility: HOSPITAL | Age: 35
DRG: 304 | End: 2020-12-15
Payer: MEDICARE

## 2020-12-15 PROBLEM — E10.9 TYPE 1 DIABETES MELLITUS (HCC): Status: ACTIVE | Noted: 2017-06-19

## 2020-12-15 LAB
ANION GAP SERPL CALCULATED.3IONS-SCNC: 13 MMOL/L (ref 4–13)
ANION GAP SERPL CALCULATED.3IONS-SCNC: 14 MMOL/L (ref 4–13)
ATRIAL RATE: 78 BPM
BETA-HYDROXYBUTYRATE: 0.1 MMOL/L
BUN SERPL-MCNC: 23 MG/DL (ref 5–25)
BUN SERPL-MCNC: 25 MG/DL (ref 5–25)
CALCIUM SERPL-MCNC: 8.5 MG/DL (ref 8.3–10.1)
CALCIUM SERPL-MCNC: 8.7 MG/DL (ref 8.3–10.1)
CHLORIDE SERPL-SCNC: 101 MMOL/L (ref 100–108)
CHLORIDE SERPL-SCNC: 106 MMOL/L (ref 100–108)
CO2 SERPL-SCNC: 23 MMOL/L (ref 21–32)
CO2 SERPL-SCNC: 25 MMOL/L (ref 21–32)
CREAT SERPL-MCNC: 7.71 MG/DL (ref 0.6–1.3)
CREAT SERPL-MCNC: 8.34 MG/DL (ref 0.6–1.3)
ERYTHROCYTE [DISTWIDTH] IN BLOOD BY AUTOMATED COUNT: 15.3 % (ref 11.6–15.1)
FLUAV RNA RESP QL NAA+PROBE: NEGATIVE
FLUBV RNA RESP QL NAA+PROBE: NEGATIVE
GFR SERPL CREATININE-BSD FRML MDRD: 7 ML/MIN/1.73SQ M
GFR SERPL CREATININE-BSD FRML MDRD: 8 ML/MIN/1.73SQ M
GLUCOSE SERPL-MCNC: 112 MG/DL (ref 65–140)
GLUCOSE SERPL-MCNC: 135 MG/DL (ref 65–140)
GLUCOSE SERPL-MCNC: 141 MG/DL (ref 65–140)
GLUCOSE SERPL-MCNC: 172 MG/DL (ref 65–140)
GLUCOSE SERPL-MCNC: 232 MG/DL (ref 65–140)
GLUCOSE SERPL-MCNC: 275 MG/DL (ref 65–140)
GLUCOSE SERPL-MCNC: 281 MG/DL (ref 65–140)
GLUCOSE SERPL-MCNC: 285 MG/DL (ref 65–140)
GLUCOSE SERPL-MCNC: 292 MG/DL (ref 65–140)
GLUCOSE SERPL-MCNC: 300 MG/DL (ref 65–140)
GLUCOSE SERPL-MCNC: 42 MG/DL (ref 65–140)
GLUCOSE SERPL-MCNC: 55 MG/DL (ref 65–140)
GLUCOSE SERPL-MCNC: 61 MG/DL (ref 65–140)
GLUCOSE SERPL-MCNC: 69 MG/DL (ref 65–140)
HCT VFR BLD AUTO: 25.8 % (ref 36.5–49.3)
HGB BLD-MCNC: 8.4 G/DL (ref 12–17)
LACTATE SERPL-SCNC: 1.1 MMOL/L (ref 0.5–2)
MAGNESIUM SERPL-MCNC: 1.9 MG/DL (ref 1.6–2.6)
MAGNESIUM SERPL-MCNC: 2 MG/DL (ref 1.6–2.6)
MCH RBC QN AUTO: 30.8 PG (ref 26.8–34.3)
MCHC RBC AUTO-ENTMCNC: 32.6 G/DL (ref 31.4–37.4)
MCV RBC AUTO: 95 FL (ref 82–98)
P AXIS: 82 DEGREES
PHOSPHATE SERPL-MCNC: 4.8 MG/DL (ref 2.7–4.5)
PHOSPHATE SERPL-MCNC: 4.9 MG/DL (ref 2.7–4.5)
PLATELET # BLD AUTO: 287 THOUSANDS/UL (ref 149–390)
PMV BLD AUTO: 9.9 FL (ref 8.9–12.7)
POTASSIUM SERPL-SCNC: 4.4 MMOL/L (ref 3.5–5.3)
POTASSIUM SERPL-SCNC: 5 MMOL/L (ref 3.5–5.3)
PR INTERVAL: 158 MS
QRS AXIS: 76 DEGREES
QRSD INTERVAL: 66 MS
QT INTERVAL: 406 MS
QTC INTERVAL: 454 MS
RBC # BLD AUTO: 2.73 MILLION/UL (ref 3.88–5.62)
RSV RNA RESP QL NAA+PROBE: NEGATIVE
SARS-COV-2 RNA RESP QL NAA+PROBE: NEGATIVE
SODIUM SERPL-SCNC: 138 MMOL/L (ref 136–145)
SODIUM SERPL-SCNC: 144 MMOL/L (ref 136–145)
T WAVE AXIS: 70 DEGREES
VENTRICULAR RATE: 75 BPM
WBC # BLD AUTO: 8.46 THOUSAND/UL (ref 4.31–10.16)

## 2020-12-15 PROCEDURE — 85027 COMPLETE CBC AUTOMATED: CPT | Performed by: INTERNAL MEDICINE

## 2020-12-15 PROCEDURE — 99292 CRITICAL CARE ADDL 30 MIN: CPT | Performed by: INTERNAL MEDICINE

## 2020-12-15 PROCEDURE — 82010 KETONE BODYS QUAN: CPT | Performed by: NURSE PRACTITIONER

## 2020-12-15 PROCEDURE — 80048 BASIC METABOLIC PNL TOTAL CA: CPT | Performed by: INTERNAL MEDICINE

## 2020-12-15 PROCEDURE — 83605 ASSAY OF LACTIC ACID: CPT | Performed by: NURSE PRACTITIONER

## 2020-12-15 PROCEDURE — 84100 ASSAY OF PHOSPHORUS: CPT | Performed by: INTERNAL MEDICINE

## 2020-12-15 PROCEDURE — 96375 TX/PRO/DX INJ NEW DRUG ADDON: CPT

## 2020-12-15 PROCEDURE — 93010 ELECTROCARDIOGRAM REPORT: CPT | Performed by: INTERNAL MEDICINE

## 2020-12-15 PROCEDURE — 71045 X-RAY EXAM CHEST 1 VIEW: CPT

## 2020-12-15 PROCEDURE — 82948 REAGENT STRIP/BLOOD GLUCOSE: CPT

## 2020-12-15 PROCEDURE — 99291 CRITICAL CARE FIRST HOUR: CPT | Performed by: PHYSICIAN ASSISTANT

## 2020-12-15 PROCEDURE — 83735 ASSAY OF MAGNESIUM: CPT | Performed by: INTERNAL MEDICINE

## 2020-12-15 PROCEDURE — 5A1D70Z PERFORMANCE OF URINARY FILTRATION, INTERMITTENT, LESS THAN 6 HOURS PER DAY: ICD-10-PCS | Performed by: INTERNAL MEDICINE

## 2020-12-15 PROCEDURE — 96374 THER/PROPH/DIAG INJ IV PUSH: CPT

## 2020-12-15 PROCEDURE — 99223 1ST HOSP IP/OBS HIGH 75: CPT | Performed by: INTERNAL MEDICINE

## 2020-12-15 PROCEDURE — 0241U HB NFCT DS VIR RESP RNA 4 TRGT: CPT | Performed by: NURSE PRACTITIONER

## 2020-12-15 RX ORDER — HYDRALAZINE HYDROCHLORIDE 25 MG/1
50 TABLET, FILM COATED ORAL EVERY MORNING
Status: DISCONTINUED | OUTPATIENT
Start: 2020-12-15 | End: 2020-12-15

## 2020-12-15 RX ORDER — PROMETHAZINE HYDROCHLORIDE 25 MG/1
25 TABLET ORAL ONCE
Status: DISCONTINUED | OUTPATIENT
Start: 2020-12-15 | End: 2020-12-15

## 2020-12-15 RX ORDER — INSULIN GLARGINE 100 [IU]/ML
7 INJECTION, SOLUTION SUBCUTANEOUS
Status: DISCONTINUED | OUTPATIENT
Start: 2020-12-16 | End: 2020-12-17 | Stop reason: HOSPADM

## 2020-12-15 RX ORDER — FAMOTIDINE 20 MG/1
20 TABLET, FILM COATED ORAL DAILY
Status: DISCONTINUED | OUTPATIENT
Start: 2020-12-15 | End: 2020-12-17 | Stop reason: HOSPADM

## 2020-12-15 RX ORDER — LISINOPRIL 20 MG/1
20 TABLET ORAL
Status: DISCONTINUED | OUTPATIENT
Start: 2020-12-16 | End: 2020-12-15

## 2020-12-15 RX ORDER — DEXTROSE MONOHYDRATE 25 G/50ML
INJECTION, SOLUTION INTRAVENOUS
Status: DISPENSED
Start: 2020-12-15 | End: 2020-12-16

## 2020-12-15 RX ORDER — DOXAZOSIN MESYLATE 1 MG/1
2 TABLET ORAL
Status: DISCONTINUED | OUTPATIENT
Start: 2020-12-15 | End: 2020-12-15

## 2020-12-15 RX ORDER — ONDANSETRON 2 MG/ML
4 INJECTION INTRAMUSCULAR; INTRAVENOUS ONCE
Status: COMPLETED | OUTPATIENT
Start: 2020-12-15 | End: 2020-12-15

## 2020-12-15 RX ORDER — CHLORHEXIDINE GLUCONATE 0.12 MG/ML
15 RINSE ORAL EVERY 12 HOURS SCHEDULED
Status: DISCONTINUED | OUTPATIENT
Start: 2020-12-15 | End: 2020-12-15

## 2020-12-15 RX ORDER — ESCITALOPRAM OXALATE 10 MG/1
10 TABLET ORAL DAILY
Status: DISCONTINUED | OUTPATIENT
Start: 2020-12-15 | End: 2020-12-17 | Stop reason: HOSPADM

## 2020-12-15 RX ORDER — LABETALOL 100 MG/1
300 TABLET, FILM COATED ORAL EVERY 12 HOURS SCHEDULED
Status: DISCONTINUED | OUTPATIENT
Start: 2020-12-15 | End: 2020-12-17 | Stop reason: HOSPADM

## 2020-12-15 RX ORDER — DOXAZOSIN MESYLATE 1 MG/1
2 TABLET ORAL
Status: DISCONTINUED | OUTPATIENT
Start: 2020-12-15 | End: 2020-12-17 | Stop reason: HOSPADM

## 2020-12-15 RX ORDER — CLONIDINE HYDROCHLORIDE 0.1 MG/1
0.1 TABLET ORAL EVERY 12 HOURS SCHEDULED
Status: DISCONTINUED | OUTPATIENT
Start: 2020-12-15 | End: 2020-12-17 | Stop reason: HOSPADM

## 2020-12-15 RX ORDER — HYDRALAZINE HYDROCHLORIDE 25 MG/1
50 TABLET, FILM COATED ORAL EVERY 12 HOURS
Status: DISCONTINUED | OUTPATIENT
Start: 2020-12-15 | End: 2020-12-15

## 2020-12-15 RX ORDER — LABETALOL 100 MG/1
300 TABLET, FILM COATED ORAL DAILY
Status: DISCONTINUED | OUTPATIENT
Start: 2020-12-15 | End: 2020-12-15

## 2020-12-15 RX ORDER — NIFEDIPINE 30 MG/1
60 TABLET, EXTENDED RELEASE ORAL 2 TIMES DAILY
Status: DISCONTINUED | OUTPATIENT
Start: 2020-12-15 | End: 2020-12-17 | Stop reason: HOSPADM

## 2020-12-15 RX ORDER — DEXTROSE MONOHYDRATE 25 G/50ML
INJECTION, SOLUTION INTRAVENOUS
Status: DISPENSED
Start: 2020-12-15 | End: 2020-12-15

## 2020-12-15 RX ORDER — TORSEMIDE 100 MG/1
100 TABLET ORAL DAILY
Status: DISCONTINUED | OUTPATIENT
Start: 2020-12-15 | End: 2020-12-15

## 2020-12-15 RX ORDER — SODIUM CHLORIDE 9 MG/ML
500 INJECTION, SOLUTION INTRAVENOUS CONTINUOUS
Status: DISCONTINUED | OUTPATIENT
Start: 2020-12-15 | End: 2020-12-15

## 2020-12-15 RX ORDER — POTASSIUM CHLORIDE 14.9 MG/ML
20 INJECTION INTRAVENOUS ONCE
Status: COMPLETED | OUTPATIENT
Start: 2020-12-15 | End: 2020-12-15

## 2020-12-15 RX ORDER — SODIUM CHLORIDE, SODIUM GLUCONATE, SODIUM ACETATE, POTASSIUM CHLORIDE, MAGNESIUM CHLORIDE, SODIUM PHOSPHATE, DIBASIC, AND POTASSIUM PHOSPHATE .53; .5; .37; .037; .03; .012; .00082 G/100ML; G/100ML; G/100ML; G/100ML; G/100ML; G/100ML; G/100ML
1000 INJECTION, SOLUTION INTRAVENOUS ONCE
Status: COMPLETED | OUTPATIENT
Start: 2020-12-15 | End: 2020-12-15

## 2020-12-15 RX ORDER — FAMOTIDINE 20 MG/1
40 TABLET, FILM COATED ORAL 2 TIMES DAILY
Status: DISCONTINUED | OUTPATIENT
Start: 2020-12-15 | End: 2020-12-15

## 2020-12-15 RX ORDER — NIFEDIPINE 30 MG/1
60 TABLET, EXTENDED RELEASE ORAL 2 TIMES DAILY
Status: DISCONTINUED | OUTPATIENT
Start: 2020-12-15 | End: 2020-12-15

## 2020-12-15 RX ORDER — CLONIDINE HYDROCHLORIDE 0.1 MG/1
0.1 TABLET ORAL EVERY 8 HOURS PRN
Status: DISCONTINUED | OUTPATIENT
Start: 2020-12-15 | End: 2020-12-17 | Stop reason: HOSPADM

## 2020-12-15 RX ORDER — LISINOPRIL 20 MG/1
20 TABLET ORAL DAILY
Status: DISCONTINUED | OUTPATIENT
Start: 2020-12-15 | End: 2020-12-15

## 2020-12-15 RX ORDER — SEVELAMER HYDROCHLORIDE 800 MG/1
2400 TABLET, FILM COATED ORAL
Status: DISCONTINUED | OUTPATIENT
Start: 2020-12-15 | End: 2020-12-17 | Stop reason: HOSPADM

## 2020-12-15 RX ORDER — ATORVASTATIN CALCIUM 40 MG/1
40 TABLET, FILM COATED ORAL EVERY EVENING
Status: DISCONTINUED | OUTPATIENT
Start: 2020-12-15 | End: 2020-12-17 | Stop reason: HOSPADM

## 2020-12-15 RX ORDER — POTASSIUM CHLORIDE 20 MEQ/1
20 TABLET, EXTENDED RELEASE ORAL ONCE
Status: COMPLETED | OUTPATIENT
Start: 2020-12-15 | End: 2020-12-15

## 2020-12-15 RX ORDER — CINACALCET 30 MG/1
60 TABLET, FILM COATED ORAL EVERY OTHER DAY
Status: DISCONTINUED | OUTPATIENT
Start: 2020-12-16 | End: 2020-12-17 | Stop reason: HOSPADM

## 2020-12-15 RX ORDER — INSULIN GLARGINE 100 [IU]/ML
15 INJECTION, SOLUTION SUBCUTANEOUS
Status: DISCONTINUED | OUTPATIENT
Start: 2020-12-15 | End: 2020-12-15

## 2020-12-15 RX ORDER — CALCIUM ACETATE 667 MG/1
2001 CAPSULE ORAL
Status: DISCONTINUED | OUTPATIENT
Start: 2020-12-15 | End: 2020-12-15

## 2020-12-15 RX ORDER — SODIUM CHLORIDE, SODIUM GLUCONATE, SODIUM ACETATE, POTASSIUM CHLORIDE, MAGNESIUM CHLORIDE, SODIUM PHOSPHATE, DIBASIC, AND POTASSIUM PHOSPHATE .53; .5; .37; .037; .03; .012; .00082 G/100ML; G/100ML; G/100ML; G/100ML; G/100ML; G/100ML; G/100ML
2000 INJECTION, SOLUTION INTRAVENOUS ONCE
Status: DISCONTINUED | OUTPATIENT
Start: 2020-12-15 | End: 2020-12-15

## 2020-12-15 RX ORDER — ASPIRIN 81 MG/1
81 TABLET, CHEWABLE ORAL DAILY
Status: DISCONTINUED | OUTPATIENT
Start: 2020-12-15 | End: 2020-12-17 | Stop reason: HOSPADM

## 2020-12-15 RX ORDER — DEXTROSE AND SODIUM CHLORIDE 5; .9 G/100ML; G/100ML
125 INJECTION, SOLUTION INTRAVENOUS CONTINUOUS
Status: DISCONTINUED | OUTPATIENT
Start: 2020-12-15 | End: 2020-12-16

## 2020-12-15 RX ORDER — ONDANSETRON 2 MG/ML
4 INJECTION INTRAMUSCULAR; INTRAVENOUS EVERY 4 HOURS PRN
Status: DISCONTINUED | OUTPATIENT
Start: 2020-12-15 | End: 2020-12-17 | Stop reason: HOSPADM

## 2020-12-15 RX ORDER — LISINOPRIL 20 MG/1
20 TABLET ORAL
Status: DISCONTINUED | OUTPATIENT
Start: 2020-12-16 | End: 2020-12-17 | Stop reason: HOSPADM

## 2020-12-15 RX ORDER — HEPARIN SODIUM 5000 [USP'U]/ML
5000 INJECTION, SOLUTION INTRAVENOUS; SUBCUTANEOUS EVERY 8 HOURS SCHEDULED
Status: DISCONTINUED | OUTPATIENT
Start: 2020-12-15 | End: 2020-12-17 | Stop reason: HOSPADM

## 2020-12-15 RX ORDER — SODIUM CHLORIDE 9 MG/ML
250 INJECTION, SOLUTION INTRAVENOUS CONTINUOUS
Status: DISCONTINUED | OUTPATIENT
Start: 2020-12-15 | End: 2020-12-15

## 2020-12-15 RX ORDER — HYDRALAZINE HYDROCHLORIDE 25 MG/1
75 TABLET, FILM COATED ORAL EVERY EVENING
Status: DISCONTINUED | OUTPATIENT
Start: 2020-12-15 | End: 2020-12-15

## 2020-12-15 RX ORDER — CALCIUM ACETATE 667 MG/1
667 CAPSULE ORAL
Status: DISCONTINUED | OUTPATIENT
Start: 2020-12-15 | End: 2020-12-15

## 2020-12-15 RX ORDER — HYDRALAZINE HYDROCHLORIDE 25 MG/1
50 TABLET, FILM COATED ORAL EVERY 12 HOURS
Status: DISCONTINUED | OUTPATIENT
Start: 2020-12-15 | End: 2020-12-17 | Stop reason: HOSPADM

## 2020-12-15 RX ORDER — DEXTROSE AND SODIUM CHLORIDE 5; .9 G/100ML; G/100ML
250 INJECTION, SOLUTION INTRAVENOUS CONTINUOUS
Status: DISCONTINUED | OUTPATIENT
Start: 2020-12-15 | End: 2020-12-15

## 2020-12-15 RX ORDER — SODIUM CHLORIDE 9 MG/ML
2000 INJECTION, SOLUTION INTRAVENOUS CONTINUOUS
Status: DISCONTINUED | OUTPATIENT
Start: 2020-12-15 | End: 2020-12-15

## 2020-12-15 RX ADMIN — TORSEMIDE 100 MG: 100 TABLET ORAL at 09:09

## 2020-12-15 RX ADMIN — ASPIRIN 81 MG CHEWABLE TABLET 81 MG: 81 TABLET CHEWABLE at 09:10

## 2020-12-15 RX ADMIN — HEPARIN SODIUM 5000 UNITS: 5000 INJECTION INTRAVENOUS; SUBCUTANEOUS at 21:42

## 2020-12-15 RX ADMIN — DEXTROSE AND SODIUM CHLORIDE 250 ML/HR: 5; .9 INJECTION, SOLUTION INTRAVENOUS at 10:51

## 2020-12-15 RX ADMIN — NIFEDIPINE 60 MG: 30 TABLET, FILM COATED, EXTENDED RELEASE ORAL at 09:09

## 2020-12-15 RX ADMIN — FAMOTIDINE 20 MG: 20 TABLET ORAL at 11:17

## 2020-12-15 RX ADMIN — ONDANSETRON 4 MG: 2 INJECTION INTRAMUSCULAR; INTRAVENOUS at 00:41

## 2020-12-15 RX ADMIN — LISINOPRIL 20 MG: 20 TABLET ORAL at 09:10

## 2020-12-15 RX ADMIN — INSULIN LISPRO 2 UNITS: 100 INJECTION, SOLUTION INTRAVENOUS; SUBCUTANEOUS at 21:49

## 2020-12-15 RX ADMIN — INSULIN GLARGINE 7 UNITS: 100 INJECTION, SOLUTION SUBCUTANEOUS at 21:43

## 2020-12-15 RX ADMIN — LABETALOL HYDROCHLORIDE 300 MG: 100 TABLET ORAL at 09:09

## 2020-12-15 RX ADMIN — HYDRALAZINE HYDROCHLORIDE 50 MG: 25 TABLET, FILM COATED ORAL at 21:42

## 2020-12-15 RX ADMIN — ATORVASTATIN CALCIUM 40 MG: 40 TABLET, FILM COATED ORAL at 18:17

## 2020-12-15 RX ADMIN — SEVELAMER HYDROCHLORIDE 2400 MG: 800 TABLET, FILM COATED PARENTERAL at 09:10

## 2020-12-15 RX ADMIN — NIFEDIPINE 60 MG: 30 TABLET, FILM COATED, EXTENDED RELEASE ORAL at 18:17

## 2020-12-15 RX ADMIN — B-COMPLEX W/ C & FOLIC ACID TAB 1 TABLET: TAB at 11:17

## 2020-12-15 RX ADMIN — POTASSIUM CHLORIDE 20 MEQ: 1500 TABLET, EXTENDED RELEASE ORAL at 11:18

## 2020-12-15 RX ADMIN — ESCITALOPRAM 10 MG: 10 TABLET, FILM COATED ORAL at 09:10

## 2020-12-15 RX ADMIN — HEPARIN SODIUM 5000 UNITS: 5000 INJECTION INTRAVENOUS; SUBCUTANEOUS at 14:22

## 2020-12-15 RX ADMIN — DEXTROSE AND SODIUM CHLORIDE 250 ML/HR: 5; .9 INJECTION, SOLUTION INTRAVENOUS at 17:20

## 2020-12-15 RX ADMIN — SODIUM CHLORIDE 9.9 UNITS/HR: 9 INJECTION, SOLUTION INTRAVENOUS at 10:51

## 2020-12-15 RX ADMIN — DOXAZOSIN 2 MG: 1 TABLET ORAL at 21:42

## 2020-12-15 RX ADMIN — METOCLOPRAMIDE HYDROCHLORIDE 10 MG: 5 INJECTION INTRAMUSCULAR; INTRAVENOUS at 00:03

## 2020-12-15 RX ADMIN — MAGNESIUM OXIDE TAB 400 MG (241.3 MG ELEMENTAL MG) 800 MG: 400 (241.3 MG) TAB at 11:17

## 2020-12-15 RX ADMIN — CHLORHEXIDINE GLUCONATE 0.12% ORAL RINSE 15 ML: 1.2 LIQUID ORAL at 09:10

## 2020-12-15 RX ADMIN — CLONIDINE HYDROCHLORIDE 0.1 MG: 0.1 TABLET ORAL at 21:42

## 2020-12-15 RX ADMIN — NICARDIPINE HYDROCHLORIDE 10 MG/HR: 2.5 INJECTION, SOLUTION INTRAVENOUS at 01:15

## 2020-12-15 RX ADMIN — LABETALOL HYDROCHLORIDE 300 MG: 100 TABLET ORAL at 21:42

## 2020-12-15 RX ADMIN — SODIUM CHLORIDE, SODIUM GLUCONATE, SODIUM ACETATE, POTASSIUM CHLORIDE, MAGNESIUM CHLORIDE, SODIUM PHOSPHATE, DIBASIC, AND POTASSIUM PHOSPHATE 1000 ML: .53; .5; .37; .037; .03; .012; .00082 INJECTION, SOLUTION INTRAVENOUS at 11:19

## 2020-12-16 ENCOUNTER — APPOINTMENT (INPATIENT)
Dept: DIALYSIS | Facility: HOSPITAL | Age: 35
DRG: 304 | End: 2020-12-16
Payer: MEDICARE

## 2020-12-16 LAB
ANION GAP SERPL CALCULATED.3IONS-SCNC: 12 MMOL/L (ref 4–13)
BASOPHILS # BLD AUTO: 0.08 THOUSANDS/ΜL (ref 0–0.1)
BASOPHILS NFR BLD AUTO: 2 % (ref 0–1)
BUN SERPL-MCNC: 20 MG/DL (ref 5–25)
CALCIUM SERPL-MCNC: 8.6 MG/DL (ref 8.3–10.1)
CHLORIDE SERPL-SCNC: 102 MMOL/L (ref 100–108)
CO2 SERPL-SCNC: 25 MMOL/L (ref 21–32)
CREAT SERPL-MCNC: 7.26 MG/DL (ref 0.6–1.3)
EOSINOPHIL # BLD AUTO: 0.56 THOUSAND/ΜL (ref 0–0.61)
EOSINOPHIL NFR BLD AUTO: 11 % (ref 0–6)
ERYTHROCYTE [DISTWIDTH] IN BLOOD BY AUTOMATED COUNT: 15.1 % (ref 11.6–15.1)
GFR SERPL CREATININE-BSD FRML MDRD: 9 ML/MIN/1.73SQ M
GLUCOSE SERPL-MCNC: 108 MG/DL (ref 65–140)
GLUCOSE SERPL-MCNC: 127 MG/DL (ref 65–140)
GLUCOSE SERPL-MCNC: 177 MG/DL (ref 65–140)
GLUCOSE SERPL-MCNC: 184 MG/DL (ref 65–140)
GLUCOSE SERPL-MCNC: 215 MG/DL (ref 65–140)
GLUCOSE SERPL-MCNC: 225 MG/DL (ref 65–140)
GLUCOSE SERPL-MCNC: 247 MG/DL (ref 65–140)
GLUCOSE SERPL-MCNC: 304 MG/DL (ref 65–140)
HCT VFR BLD AUTO: 22.5 % (ref 36.5–49.3)
HGB BLD-MCNC: 7.2 G/DL (ref 12–17)
IMM GRANULOCYTES # BLD AUTO: 0.02 THOUSAND/UL (ref 0–0.2)
IMM GRANULOCYTES NFR BLD AUTO: 0 % (ref 0–2)
LYMPHOCYTES # BLD AUTO: 1.38 THOUSANDS/ΜL (ref 0.6–4.47)
LYMPHOCYTES NFR BLD AUTO: 27 % (ref 14–44)
MAGNESIUM SERPL-MCNC: 1.9 MG/DL (ref 1.6–2.6)
MCH RBC QN AUTO: 30.3 PG (ref 26.8–34.3)
MCHC RBC AUTO-ENTMCNC: 32 G/DL (ref 31.4–37.4)
MCV RBC AUTO: 95 FL (ref 82–98)
MONOCYTES # BLD AUTO: 0.42 THOUSAND/ΜL (ref 0.17–1.22)
MONOCYTES NFR BLD AUTO: 8 % (ref 4–12)
NEUTROPHILS # BLD AUTO: 2.69 THOUSANDS/ΜL (ref 1.85–7.62)
NEUTS SEG NFR BLD AUTO: 52 % (ref 43–75)
NRBC BLD AUTO-RTO: 0 /100 WBCS
PLATELET # BLD AUTO: 234 THOUSANDS/UL (ref 149–390)
PMV BLD AUTO: 10 FL (ref 8.9–12.7)
POTASSIUM SERPL-SCNC: 4.5 MMOL/L (ref 3.5–5.3)
RBC # BLD AUTO: 2.38 MILLION/UL (ref 3.88–5.62)
SODIUM SERPL-SCNC: 139 MMOL/L (ref 136–145)
WBC # BLD AUTO: 5.15 THOUSAND/UL (ref 4.31–10.16)

## 2020-12-16 PROCEDURE — 80048 BASIC METABOLIC PNL TOTAL CA: CPT | Performed by: INTERNAL MEDICINE

## 2020-12-16 PROCEDURE — 99232 SBSQ HOSP IP/OBS MODERATE 35: CPT | Performed by: INTERNAL MEDICINE

## 2020-12-16 PROCEDURE — 5A1D70Z PERFORMANCE OF URINARY FILTRATION, INTERMITTENT, LESS THAN 6 HOURS PER DAY: ICD-10-PCS | Performed by: INTERNAL MEDICINE

## 2020-12-16 PROCEDURE — 83735 ASSAY OF MAGNESIUM: CPT | Performed by: INTERNAL MEDICINE

## 2020-12-16 PROCEDURE — 82948 REAGENT STRIP/BLOOD GLUCOSE: CPT

## 2020-12-16 PROCEDURE — 85025 COMPLETE CBC W/AUTO DIFF WBC: CPT | Performed by: INTERNAL MEDICINE

## 2020-12-16 RX ORDER — CHOLECALCIFEROL (VITAMIN D3) 10 MCG
1 TABLET ORAL
Status: DISCONTINUED | OUTPATIENT
Start: 2020-12-16 | End: 2020-12-17 | Stop reason: HOSPADM

## 2020-12-16 RX ADMIN — SEVELAMER HYDROCHLORIDE 2400 MG: 800 TABLET, FILM COATED PARENTERAL at 12:47

## 2020-12-16 RX ADMIN — INSULIN LISPRO 4 UNITS: 100 INJECTION, SOLUTION INTRAVENOUS; SUBCUTANEOUS at 17:38

## 2020-12-16 RX ADMIN — INSULIN GLARGINE 7 UNITS: 100 INJECTION, SOLUTION SUBCUTANEOUS at 22:20

## 2020-12-16 RX ADMIN — INSULIN LISPRO 1 UNITS: 100 INJECTION, SOLUTION INTRAVENOUS; SUBCUTANEOUS at 17:39

## 2020-12-16 RX ADMIN — ESCITALOPRAM 10 MG: 10 TABLET, FILM COATED ORAL at 08:02

## 2020-12-16 RX ADMIN — FAMOTIDINE 20 MG: 20 TABLET ORAL at 08:01

## 2020-12-16 RX ADMIN — DOXAZOSIN 2 MG: 1 TABLET ORAL at 22:20

## 2020-12-16 RX ADMIN — Medication 1 CAPSULE: at 17:35

## 2020-12-16 RX ADMIN — CINACALCET 60 MG: 30 TABLET, FILM COATED ORAL at 08:09

## 2020-12-16 RX ADMIN — LISINOPRIL 20 MG: 20 TABLET ORAL at 17:35

## 2020-12-16 RX ADMIN — HEPARIN SODIUM 5000 UNITS: 5000 INJECTION INTRAVENOUS; SUBCUTANEOUS at 05:52

## 2020-12-16 RX ADMIN — SEVELAMER HYDROCHLORIDE 2400 MG: 800 TABLET, FILM COATED PARENTERAL at 17:35

## 2020-12-16 RX ADMIN — CLONIDINE HYDROCHLORIDE 0.1 MG: 0.1 TABLET ORAL at 08:01

## 2020-12-16 RX ADMIN — LABETALOL HYDROCHLORIDE 300 MG: 100 TABLET ORAL at 20:48

## 2020-12-16 RX ADMIN — INSULIN LISPRO 3 UNITS: 100 INJECTION, SOLUTION INTRAVENOUS; SUBCUTANEOUS at 12:46

## 2020-12-16 RX ADMIN — LABETALOL HYDROCHLORIDE 300 MG: 100 TABLET ORAL at 08:04

## 2020-12-16 RX ADMIN — INSULIN LISPRO 1 UNITS: 100 INJECTION, SOLUTION INTRAVENOUS; SUBCUTANEOUS at 08:31

## 2020-12-16 RX ADMIN — SEVELAMER HYDROCHLORIDE 2400 MG: 800 TABLET, FILM COATED PARENTERAL at 08:01

## 2020-12-16 RX ADMIN — HEPARIN SODIUM 5000 UNITS: 5000 INJECTION INTRAVENOUS; SUBCUTANEOUS at 12:48

## 2020-12-16 RX ADMIN — ASPIRIN 81 MG CHEWABLE TABLET 81 MG: 81 TABLET CHEWABLE at 08:01

## 2020-12-16 RX ADMIN — NIFEDIPINE 60 MG: 30 TABLET, FILM COATED, EXTENDED RELEASE ORAL at 17:35

## 2020-12-16 RX ADMIN — HYDRALAZINE HYDROCHLORIDE 50 MG: 25 TABLET, FILM COATED ORAL at 20:48

## 2020-12-16 RX ADMIN — ATORVASTATIN CALCIUM 40 MG: 40 TABLET, FILM COATED ORAL at 17:35

## 2020-12-16 RX ADMIN — HEPARIN SODIUM 5000 UNITS: 5000 INJECTION INTRAVENOUS; SUBCUTANEOUS at 22:20

## 2020-12-16 RX ADMIN — CLONIDINE HYDROCHLORIDE 0.1 MG: 0.1 TABLET ORAL at 20:48

## 2020-12-16 RX ADMIN — INSULIN LISPRO 4 UNITS: 100 INJECTION, SOLUTION INTRAVENOUS; SUBCUTANEOUS at 08:09

## 2020-12-17 VITALS
DIASTOLIC BLOOD PRESSURE: 89 MMHG | WEIGHT: 215.39 LBS | BODY MASS INDEX: 30.15 KG/M2 | OXYGEN SATURATION: 96 % | TEMPERATURE: 98.2 F | RESPIRATION RATE: 14 BRPM | SYSTOLIC BLOOD PRESSURE: 160 MMHG | HEIGHT: 71 IN | HEART RATE: 75 BPM

## 2020-12-17 LAB
BASOPHILS # BLD AUTO: 0.07 THOUSANDS/ΜL (ref 0–0.1)
BASOPHILS NFR BLD AUTO: 1 % (ref 0–1)
EOSINOPHIL # BLD AUTO: 0.71 THOUSAND/ΜL (ref 0–0.61)
EOSINOPHIL NFR BLD AUTO: 13 % (ref 0–6)
ERYTHROCYTE [DISTWIDTH] IN BLOOD BY AUTOMATED COUNT: 14.9 % (ref 11.6–15.1)
FERRITIN SERPL-MCNC: 502 NG/ML (ref 8–388)
GLUCOSE SERPL-MCNC: 101 MG/DL (ref 65–140)
GLUCOSE SERPL-MCNC: 103 MG/DL (ref 65–140)
HCT VFR BLD AUTO: 23.1 % (ref 36.5–49.3)
HGB BLD-MCNC: 7.4 G/DL (ref 12–17)
IMM GRANULOCYTES # BLD AUTO: 0.02 THOUSAND/UL (ref 0–0.2)
IMM GRANULOCYTES NFR BLD AUTO: 0 % (ref 0–2)
IRON SATN MFR SERPL: 37 %
IRON SERPL-MCNC: 63 UG/DL (ref 65–175)
LYMPHOCYTES # BLD AUTO: 1.23 THOUSANDS/ΜL (ref 0.6–4.47)
LYMPHOCYTES NFR BLD AUTO: 23 % (ref 14–44)
MCH RBC QN AUTO: 30.3 PG (ref 26.8–34.3)
MCHC RBC AUTO-ENTMCNC: 32 G/DL (ref 31.4–37.4)
MCV RBC AUTO: 95 FL (ref 82–98)
MONOCYTES # BLD AUTO: 0.39 THOUSAND/ΜL (ref 0.17–1.22)
MONOCYTES NFR BLD AUTO: 7 % (ref 4–12)
NEUTROPHILS # BLD AUTO: 2.9 THOUSANDS/ΜL (ref 1.85–7.62)
NEUTS SEG NFR BLD AUTO: 56 % (ref 43–75)
NRBC BLD AUTO-RTO: 0 /100 WBCS
PLATELET # BLD AUTO: 255 THOUSANDS/UL (ref 149–390)
PMV BLD AUTO: 9.5 FL (ref 8.9–12.7)
RBC # BLD AUTO: 2.44 MILLION/UL (ref 3.88–5.62)
TIBC SERPL-MCNC: 172 UG/DL (ref 250–450)
WBC # BLD AUTO: 5.32 THOUSAND/UL (ref 4.31–10.16)

## 2020-12-17 PROCEDURE — 85025 COMPLETE CBC W/AUTO DIFF WBC: CPT | Performed by: NURSE PRACTITIONER

## 2020-12-17 PROCEDURE — 99239 HOSP IP/OBS DSCHRG MGMT >30: CPT | Performed by: INTERNAL MEDICINE

## 2020-12-17 PROCEDURE — 83540 ASSAY OF IRON: CPT | Performed by: NURSE PRACTITIONER

## 2020-12-17 PROCEDURE — 82728 ASSAY OF FERRITIN: CPT | Performed by: NURSE PRACTITIONER

## 2020-12-17 PROCEDURE — 83550 IRON BINDING TEST: CPT | Performed by: NURSE PRACTITIONER

## 2020-12-17 PROCEDURE — 82948 REAGENT STRIP/BLOOD GLUCOSE: CPT

## 2020-12-17 PROCEDURE — 99232 SBSQ HOSP IP/OBS MODERATE 35: CPT | Performed by: PHYSICIAN ASSISTANT

## 2020-12-17 RX ORDER — CLONIDINE HYDROCHLORIDE 0.1 MG/1
0.1 TABLET ORAL EVERY 12 HOURS SCHEDULED
Qty: 60 TABLET | Refills: 0 | Status: SHIPPED | OUTPATIENT
Start: 2020-12-17 | End: 2021-02-16

## 2020-12-17 RX ORDER — HYDRALAZINE HYDROCHLORIDE 100 MG/1
50 TABLET, FILM COATED ORAL
Status: CANCELLED
Start: 2020-12-17

## 2020-12-17 RX ORDER — INSULIN GLARGINE 100 [IU]/ML
14 INJECTION, SOLUTION SUBCUTANEOUS
Status: ON HOLD
Start: 2020-12-17 | End: 2022-07-16 | Stop reason: SDUPTHER

## 2020-12-17 RX ADMIN — SEVELAMER HYDROCHLORIDE 2400 MG: 800 TABLET, FILM COATED PARENTERAL at 12:06

## 2020-12-17 RX ADMIN — LABETALOL HYDROCHLORIDE 300 MG: 100 TABLET ORAL at 09:14

## 2020-12-17 RX ADMIN — INSULIN LISPRO 4 UNITS: 100 INJECTION, SOLUTION INTRAVENOUS; SUBCUTANEOUS at 12:07

## 2020-12-17 RX ADMIN — INSULIN LISPRO 4 UNITS: 100 INJECTION, SOLUTION INTRAVENOUS; SUBCUTANEOUS at 08:09

## 2020-12-17 RX ADMIN — ASPIRIN 81 MG CHEWABLE TABLET 81 MG: 81 TABLET CHEWABLE at 09:14

## 2020-12-17 RX ADMIN — NIFEDIPINE 60 MG: 30 TABLET, FILM COATED, EXTENDED RELEASE ORAL at 09:15

## 2020-12-17 RX ADMIN — CLONIDINE HYDROCHLORIDE 0.1 MG: 0.1 TABLET ORAL at 09:14

## 2020-12-17 RX ADMIN — FAMOTIDINE 20 MG: 20 TABLET ORAL at 09:14

## 2020-12-17 RX ADMIN — HEPARIN SODIUM 5000 UNITS: 5000 INJECTION INTRAVENOUS; SUBCUTANEOUS at 05:50

## 2020-12-17 RX ADMIN — ESCITALOPRAM 10 MG: 10 TABLET, FILM COATED ORAL at 09:14

## 2020-12-17 RX ADMIN — IRON SUCROSE 50 MG: 20 INJECTION, SOLUTION INTRAVENOUS at 09:17

## 2020-12-17 RX ADMIN — HYDRALAZINE HYDROCHLORIDE 50 MG: 25 TABLET, FILM COATED ORAL at 09:14

## 2020-12-21 ENCOUNTER — TELEPHONE (OUTPATIENT)
Dept: PSYCHIATRY | Facility: CLINIC | Age: 35
End: 2020-12-21

## 2020-12-26 ENCOUNTER — TRANSCRIBE ORDERS (OUTPATIENT)
Dept: LAB | Facility: HOSPITAL | Age: 35
End: 2020-12-26

## 2020-12-26 ENCOUNTER — HOSPITAL ENCOUNTER (EMERGENCY)
Facility: HOSPITAL | Age: 35
Discharge: HOME | End: 2020-12-26
Attending: EMERGENCY MEDICINE | Admitting: EMERGENCY MEDICINE
Payer: MEDICARE

## 2020-12-26 VITALS
HEIGHT: 71 IN | DIASTOLIC BLOOD PRESSURE: 80 MMHG | SYSTOLIC BLOOD PRESSURE: 175 MMHG | HEART RATE: 76 BPM | WEIGHT: 217.59 LBS | RESPIRATION RATE: 18 BRPM | BODY MASS INDEX: 30.46 KG/M2 | TEMPERATURE: 98.1 F | OXYGEN SATURATION: 99 %

## 2020-12-26 DIAGNOSIS — I10 HYPERTENSION: ICD-10-CM

## 2020-12-26 DIAGNOSIS — N18.9 CKD (CHRONIC KIDNEY DISEASE): ICD-10-CM

## 2020-12-26 DIAGNOSIS — D64.9 ANEMIA, UNSPECIFIED TYPE: Primary | ICD-10-CM

## 2020-12-26 LAB
ALBUMIN SERPL BCP-MCNC: 3.2 G/DL (ref 3.5–5)
ALP SERPL-CCNC: 82 U/L (ref 46–116)
ALT SERPL W P-5'-P-CCNC: 15 U/L (ref 12–78)
ANION GAP SERPL CALCULATED.3IONS-SCNC: 8 MMOL/L (ref 4–13)
AST SERPL W P-5'-P-CCNC: 9 U/L (ref 5–45)
BASOPHILS # BLD AUTO: 0.06 THOUSANDS/ΜL (ref 0–0.1)
BASOPHILS NFR BLD AUTO: 1 % (ref 0–1)
BILIRUB SERPL-MCNC: 0.38 MG/DL (ref 0.2–1)
BUN SERPL-MCNC: 17 MG/DL (ref 5–25)
CALCIUM ALBUM COR SERPL-MCNC: 9 MG/DL (ref 8.3–10.1)
CALCIUM SERPL-MCNC: 8.4 MG/DL (ref 8.3–10.1)
CHLORIDE SERPL-SCNC: 105 MMOL/L (ref 100–108)
CO2 SERPL-SCNC: 31 MMOL/L (ref 21–32)
CREAT SERPL-MCNC: 6.67 MG/DL (ref 0.6–1.3)
EOSINOPHIL # BLD AUTO: 0.68 THOUSAND/ΜL (ref 0–0.61)
EOSINOPHIL NFR BLD AUTO: 14 % (ref 0–6)
ERYTHROCYTE [DISTWIDTH] IN BLOOD BY AUTOMATED COUNT: 15.9 % (ref 11.6–15.1)
GFR SERPL CREATININE-BSD FRML MDRD: 10 ML/MIN/1.73SQ M
GLUCOSE SERPL-MCNC: 87 MG/DL (ref 65–140)
HCT VFR BLD AUTO: 23.2 % (ref 36.5–49.3)
HGB BLD-MCNC: 7.4 G/DL (ref 12–17)
HOLD SPECIMEN: YES
IMM GRANULOCYTES # BLD AUTO: 0.02 THOUSAND/UL (ref 0–0.2)
IMM GRANULOCYTES NFR BLD AUTO: 0 % (ref 0–2)
LYMPHOCYTES # BLD AUTO: 1.09 THOUSANDS/ΜL (ref 0.6–4.47)
LYMPHOCYTES NFR BLD AUTO: 22 % (ref 14–44)
MCH RBC QN AUTO: 30.5 PG (ref 26.8–34.3)
MCHC RBC AUTO-ENTMCNC: 31.9 G/DL (ref 31.4–37.4)
MCV RBC AUTO: 96 FL (ref 82–98)
MONOCYTES # BLD AUTO: 0.35 THOUSAND/ΜL (ref 0.17–1.22)
MONOCYTES NFR BLD AUTO: 7 % (ref 4–12)
NEUTROPHILS # BLD AUTO: 2.72 THOUSANDS/ΜL (ref 1.85–7.62)
NEUTS SEG NFR BLD AUTO: 56 % (ref 43–75)
NRBC BLD AUTO-RTO: 0 /100 WBCS
PLATELET # BLD AUTO: 261 THOUSANDS/UL (ref 149–390)
PMV BLD AUTO: 9.5 FL (ref 8.9–12.7)
POTASSIUM SERPL-SCNC: 4.3 MMOL/L (ref 3.5–5.3)
PROT SERPL-MCNC: 6 G/DL (ref 6.4–8.2)
RBC # BLD AUTO: 2.43 MILLION/UL (ref 3.88–5.62)
SODIUM SERPL-SCNC: 144 MMOL/L (ref 136–145)
WBC # BLD AUTO: 4.92 THOUSAND/UL (ref 4.31–10.16)

## 2020-12-26 PROCEDURE — 99283 EMERGENCY DEPT VISIT LOW MDM: CPT

## 2020-12-26 PROCEDURE — 80053 COMPREHEN METABOLIC PANEL: CPT | Performed by: EMERGENCY MEDICINE

## 2020-12-26 PROCEDURE — 85025 COMPLETE CBC W/AUTO DIFF WBC: CPT | Performed by: EMERGENCY MEDICINE

## 2020-12-26 PROCEDURE — 36415 COLL VENOUS BLD VENIPUNCTURE: CPT

## 2020-12-26 PROCEDURE — 99284 EMERGENCY DEPT VISIT MOD MDM: CPT | Performed by: PHYSICIAN ASSISTANT

## 2020-12-28 ENCOUNTER — TRANSITIONAL CARE MANAGEMENT (OUTPATIENT)
Dept: INTERNAL MEDICINE CLINIC | Facility: CLINIC | Age: 35
End: 2020-12-28

## 2021-01-03 ENCOUNTER — HOSPITAL ENCOUNTER (EMERGENCY)
Facility: HOSPITAL | Age: 36
Discharge: HOME | End: 2021-01-03
Attending: EMERGENCY MEDICINE
Payer: MEDICARE

## 2021-01-03 VITALS
SYSTOLIC BLOOD PRESSURE: 193 MMHG | OXYGEN SATURATION: 100 % | DIASTOLIC BLOOD PRESSURE: 86 MMHG | HEART RATE: 81 BPM | TEMPERATURE: 97 F | RESPIRATION RATE: 18 BRPM

## 2021-01-03 DIAGNOSIS — D64.9 ANEMIA: Primary | ICD-10-CM

## 2021-01-03 LAB
ALBUMIN SERPL BCP-MCNC: 3.3 G/DL (ref 3.5–5)
ALP SERPL-CCNC: 92 U/L (ref 46–116)
ALT SERPL W P-5'-P-CCNC: 13 U/L (ref 12–78)
ANION GAP SERPL CALCULATED.3IONS-SCNC: 6 MMOL/L (ref 4–13)
AST SERPL W P-5'-P-CCNC: 13 U/L (ref 5–45)
BASOPHILS # BLD AUTO: 0.05 THOUSANDS/ΜL (ref 0–0.1)
BASOPHILS NFR BLD AUTO: 1 % (ref 0–1)
BILIRUB SERPL-MCNC: 0.45 MG/DL (ref 0.2–1)
BUN SERPL-MCNC: 19 MG/DL (ref 5–25)
CALCIUM ALBUM COR SERPL-MCNC: 9.1 MG/DL (ref 8.3–10.1)
CALCIUM SERPL-MCNC: 8.5 MG/DL (ref 8.3–10.1)
CHLORIDE SERPL-SCNC: 99 MMOL/L (ref 100–108)
CO2 SERPL-SCNC: 29 MMOL/L (ref 21–32)
CREAT SERPL-MCNC: 5.65 MG/DL (ref 0.6–1.3)
EOSINOPHIL # BLD AUTO: 0.51 THOUSAND/ΜL (ref 0–0.61)
EOSINOPHIL NFR BLD AUTO: 12 % (ref 0–6)
ERYTHROCYTE [DISTWIDTH] IN BLOOD BY AUTOMATED COUNT: 15.3 % (ref 11.6–15.1)
GFR SERPL CREATININE-BSD FRML MDRD: 12 ML/MIN/1.73SQ M
GLUCOSE SERPL-MCNC: 251 MG/DL (ref 65–140)
HCT VFR BLD AUTO: 24.5 % (ref 36.5–49.3)
HGB BLD-MCNC: 7.9 G/DL (ref 12–17)
IMM GRANULOCYTES # BLD AUTO: 0.01 THOUSAND/UL (ref 0–0.2)
IMM GRANULOCYTES NFR BLD AUTO: 0 % (ref 0–2)
LYMPHOCYTES # BLD AUTO: 0.85 THOUSANDS/ΜL (ref 0.6–4.47)
LYMPHOCYTES NFR BLD AUTO: 20 % (ref 14–44)
MCH RBC QN AUTO: 30.9 PG (ref 26.8–34.3)
MCHC RBC AUTO-ENTMCNC: 32.2 G/DL (ref 31.4–37.4)
MCV RBC AUTO: 96 FL (ref 82–98)
MONOCYTES # BLD AUTO: 0.24 THOUSAND/ΜL (ref 0.17–1.22)
MONOCYTES NFR BLD AUTO: 6 % (ref 4–12)
NEUTROPHILS # BLD AUTO: 2.52 THOUSANDS/ΜL (ref 1.85–7.62)
NEUTS SEG NFR BLD AUTO: 61 % (ref 43–75)
NRBC BLD AUTO-RTO: 0 /100 WBCS
PLATELET # BLD AUTO: 257 THOUSANDS/UL (ref 149–390)
PMV BLD AUTO: 9.6 FL (ref 8.9–12.7)
POTASSIUM SERPL-SCNC: 4.2 MMOL/L (ref 3.5–5.3)
PROT SERPL-MCNC: 6.5 G/DL (ref 6.4–8.2)
RBC # BLD AUTO: 2.56 MILLION/UL (ref 3.88–5.62)
SODIUM SERPL-SCNC: 134 MMOL/L (ref 136–145)
WBC # BLD AUTO: 4.18 THOUSAND/UL (ref 4.31–10.16)

## 2021-01-03 PROCEDURE — 36415 COLL VENOUS BLD VENIPUNCTURE: CPT

## 2021-01-03 PROCEDURE — 99284 EMERGENCY DEPT VISIT MOD MDM: CPT | Performed by: EMERGENCY MEDICINE

## 2021-01-03 PROCEDURE — 85025 COMPLETE CBC W/AUTO DIFF WBC: CPT | Performed by: EMERGENCY MEDICINE

## 2021-01-03 PROCEDURE — 99283 EMERGENCY DEPT VISIT LOW MDM: CPT

## 2021-01-03 PROCEDURE — 80053 COMPREHEN METABOLIC PANEL: CPT | Performed by: EMERGENCY MEDICINE

## 2021-01-03 NOTE — ED NOTES
Dr Hurman Ormond at patient bedside to medically evaluate patient       Robert Holguin RN  01/03/21 9401

## 2021-01-03 NOTE — ED ATTENDING ATTESTATION
1/3/2021  Valencia VOGEL MD, saw and evaluated the patient  I have discussed the patient with the resident/non-physician practitioner and agree with the resident's/non-physician practitioner's findings, Plan of Care, and MDM as documented in the resident's/non-physician practitioner's note, except where noted  All available labs and Radiology studies were reviewed  I was present for key portions of any procedure(s) performed by the resident/non-physician practitioner and I was immediately available to provide assistance  At this point I agree with the current assessment done in the Emergency Department    I have conducted an independent evaluation of this patient a history and physical is as follows:    ED Course  ED Course as of Jan 03 1734   Ric Lay Jan 03, 2021   1620 Er md note- recent lab and lab trends reviewed  by er md      26 -  er md note- all labs first nursed      200 Er md note- case d/w- dr Carin Zurita- nephrology-  -  volume component from hd agrees with d/c--  this discussed with pt and mother who agree with plan             Critical Care Time  Procedures

## 2021-01-03 NOTE — DISCHARGE INSTRUCTIONS
Diagnosis; anemia    - activity as tolerated -- current hemoglobin is 7 9---     - epogen takes 2-6 weeks to work       - please return to  the er for any new/ worsening/concerning symptoms to you

## 2021-01-03 NOTE — ED PROVIDER NOTES
History  Chief Complaint   Patient presents with    Abnormal Lab     pt was at dialysis today, was told his hg is 6 8  pt denies complaints      Shayla Rosa is a 38 y o  male with history of anemia and ESRD on HD who presents to the ED due to a hemoglobin of 6 8 at dialysis this morning  The patient reports that he had a full dialysis session without issues but notes that his blood pressure was not as high as it normally is, so he was given Epogen which he is not usually able to get  He says he has felt a bit more fatigued the last week, and he has had a small amount of increased light-headedness with standing due to the lower BP this morning, but he otherwise denies new symptoms including syncope, fever, chills, headache, vision change, chest pain, palpitations, abdominal pain, and bloody stools  The notes that he always feels dizzy to some extent due to 2 prior strokes, but he says he feels more dizzy when his systolic BP drops below 768  Prior to Admission Medications   Prescriptions Last Dose Informant Patient Reported? Taking? B-D ULTRAFINE III SHORT PEN 31G X 8 MM MISC  Self No No   Sig: USE 1 PEN NEEDLE 8 TIMES DAILY   GLUCAGON EMERGENCY 1 MG injection  Self Yes No   Sig: INJECT 1MG SUBCUTANEOUSLY AS NEEDED (AS DIRECTED)  MAY REPEAT DOSE EVERY 20 MINUTES AS NEEDED   NIFEdipine ER (ADALAT CC) 60 MG 24 hr tablet  Self No No   Sig: Take 1 tablet (60 mg total) by mouth 2 (two) times a day   Velphoro 500 MG CHEW  Self Yes No   Sig: CHEW AND SWALLOW 1 TABLET BY MOUTH WITH MEALS (UP TO THREE TIMES DAILY)     aspirin 81 mg chewable tablet  Self Yes No   Sig: Chew 81 mg daily   atorvastatin (LIPITOR) 40 mg tablet  Self No No   Sig: Take 1 tablet (40 mg total) by mouth every evening   b complex vitamins capsule  Self Yes No   Sig: Take 1 capsule by mouth daily before lunch    bismuth subsalicylate (PEPTO BISMOL) 524 mg/30 mL oral suspension  Self No No   Sig: Take 15 mL (262 mg total) by mouth 2 (two) times a day as needed for diarrhea   calcium acetate (CALPHRON) 667 mg  Self Yes No   Sig: TAKE 3 TABLETS BY MOUTH WITH MEALS   cinacalcet (SENSIPAR) 60 MG tablet  Self No No   Sig: Take 1 tablet (60 mg total) by mouth every other day   cloNIDine (CATAPRES) 0 1 mg tablet   No No   Sig: Take 1 tablet (0 1 mg total) by mouth every 12 (twelve) hours   dicyclomine (BENTYL) 20 mg tablet  Self No No   Sig: Take 1 tablet (20 mg total) by mouth 3 (three) times a day as needed (abdominal cramps)   doxazosin (CARDURA) 2 mg tablet  Self No No   Sig: Take 1 tablet (2 mg total) by mouth daily at bedtime   Patient taking differently: Take 2 mg by mouth daily    escitalopram (LEXAPRO) 10 mg tablet  Self No No   Sig: Take 1 tablet (10 mg total) by mouth daily   famotidine (PEPCID) 40 MG tablet   No No   Sig: Take 1 tablet (40 mg total) by mouth 2 (two) times a day   gabapentin (NEURONTIN) 100 mg capsule  Self Yes No   Sig: Take 100 mg by mouth daily at bedtime   hydrALAZINE (APRESOLINE) 100 MG tablet  Self No No   Sig: Take 0 5 tablets (50 mg total) by mouth 2 (two) times a day   Patient taking differently: Take 50 mg by mouth 50mg in the am ad 75mg at night   insulin glargine (Basaglar KwikPen) 100 units/mL injection pen   No No   Sig: Inject 14 Units under the skin daily at bedtime   insulin lispro (HumaLOG) 100 units/mL injection  Self No No   Sig: Inject 4 Units under the skin 3 (three) times a day with meals   Patient taking differently: Inject 4 Units under the skin 3 (three) times a day with meals Takes up to 5 times a day   labetalol (NORMODYNE) 300 mg tablet  Self No No   Sig: Take 1 tablet (300 mg total) by mouth every 12 (twelve) hours   Patient taking differently: Take 300 mg by mouth daily    lisinopril (ZESTRIL) 20 mg tablet  Self No No   Sig: Take 1 tablet (20 mg total) by mouth daily   loperamide (IMODIUM) 2 mg capsule  Self No No   Sig: Take 1 capsule (2 mg total) by mouth 3 (three) times a day as needed for diarrhea   saccharomyces boulardii (FLORASTOR) 250 mg capsule  Self No No   Sig: Take 1 capsule (250 mg total) by mouth 2 (two) times a day   sevelamer (RENAGEL) 800 mg tablet  Self No No   Sig: Take 3 tablets (2,400 mg total) by mouth 3 (three) times a day with meals      Facility-Administered Medications: None       Past Medical History:   Diagnosis Date    Acute kidney injury ([place] [address] )     Ambulates with cane     Anuria     Anxiety     Chronic kidney disease     Depression     Diabetes mellitus ([place] [address] )     Diarrhea     Falls     Gastroparesis     GERD (gastroesophageal reflux disease)     History of shingles 2010    History of transfusion 02/2018    no adverse reaction    Hyperlipidemia     Hyperphosphatemia     Hypertension     Itching     Muscle weakness     general unsteadiness    Retinopathy     Seizures ([place] [address] )     early 2020 - one time    Skin abnormality     some dime size areas where skin was scratched from itching    Stroke ([place] [address] )     x2 - off balance/no driving/fatigue    Swelling of both lower extremities     Vomiting     Wears glasses        Past Surgical History:   Procedure Laterality Date    CARDIAC LOOP RECORDER  05/2018    COLONOSCOPY      EGD      EYE SURGERY Right     IR TUNNELED DIALYSIS CATHETER PLACEMENT  11/18/2020    PERITONEAL CATHETER INSERTION N/A 8/27/2018    Procedure: UNROOF PD CATHETER;  Surgeon: Munir Jessica DO;  [facility];  Service: General    CT ANASTOMOSIS,AV,ANY SITE Left 11/9/2020    Procedure: CREATION FISTULA  ARTERIOVENOUS (AV) - LEFT WRIST;  Surgeon: Mandi Figueroa MD;  [facility];  Service: Vascular    CT ESOPHAGOGASTRODUODENOSCOPY TRANSORAL DIAGNOSTIC N/A 4/18/2019    Procedure: ESOPHAGOGASTRODUODENOSCOPY (EGD); Surgeon: Alexandria Edwards MD;  [facility];   Service: Gastroenterology    CT LAP INSERTION TUNNELED INTRAPERITONEAL CATHETER N/A 8/6/2018    Procedure: LAPAROSCOPIC PD CATHETER PLACEMENT;  Surgeon: Ronald Griffin DO Guicho;  [facility];  Service: General    AR REMOVAL TUNNELED INTRAPERITONEAL CATHETER N/A 2020    Procedure: REMOVAL CATHETER PERITONEAL DIALYSIS;  Surgeon: Anisha Gong MD;  [facility];  Service: General    TONSILLECTOMY      UPPER GASTROINTESTINAL ENDOSCOPY         Family History   Problem Relation Age of Onset   Brennan Breast cancer Mother     Hypertension Mother     Hyperlipidemia Father     Hypertension Father     Leukemia Maternal Grandmother     Hyperlipidemia Maternal Grandfather     Hypertension Maternal Grandfather     Hyperlipidemia Paternal Grandmother     Hypertension Paternal Grandmother     Heart disease Paternal Grandfather         cardiac disorder    Diabetes Paternal Grandfather      I have reviewed and agree with the history as documented  E-Cigarette/Vaping    E-Cigarette Use Current Every Day User     Comments medical marijuana      E-Cigarette/Vaping Substances    Nicotine No     THC Yes     CBD Yes     Flavoring No     Other No     Unknown No      Social History     Tobacco Use    Smoking status: Former Smoker     Packs/day: 0 50     Years: 12 00     Pack years: 6 00     Types: Cigarettes     Quit date: 2018     Years since quittin 9    Smokeless tobacco: Never Used    Tobacco comment: quit 2018   Substance Use Topics    Alcohol use: Not Currently    Drug use: Yes     Types: Marijuana     Comment: medical marijuana        Review of Systems   Constitutional: Positive for fatigue  Negative for chills and fever  Eyes: Negative for visual disturbance  Respiratory: Negative for shortness of breath  Cardiovascular: Negative for chest pain and palpitations  Gastrointestinal: Negative for abdominal pain and blood in stool  Neurological: Positive for light-headedness (with standing)  Negative for syncope and headaches  All other systems reviewed and are negative        Physical Exam  ED Triage Vitals   Temperature Pulse Respirations Blood Pressure SpO2   01/03/21 1515 01/03/21 1515 01/03/21 1515 01/03/21 1517 01/03/21 1515   (!) 97 °F (36 1 °C) 81 18 (!) 193/86 100 %      Temp Source Heart Rate Source Patient Position - Orthostatic VS BP Location FiO2 (%)   01/03/21 1515 -- -- -- --   Tympanic          Pain Score       --                    Orthostatic Vital Signs  Vitals:    01/03/21 1515 01/03/21 1517   BP:  (!) 193/86   Pulse: 81        Physical Exam  Vitals signs reviewed  Constitutional:       General: He is not in acute distress  HENT:      Head: Normocephalic and atraumatic  Mouth/Throat:      Mouth: Mucous membranes are moist       Pharynx: Oropharynx is clear  Eyes:      General: No scleral icterus  Extraocular Movements: Extraocular movements intact  Pupils: Pupils are equal, round, and reactive to light  Neck:      Musculoskeletal: Normal range of motion and neck supple  Cardiovascular:      Rate and Rhythm: Normal rate and regular rhythm  Pulses: Normal pulses  Heart sounds: Normal heart sounds  No murmur  No friction rub  No gallop  Pulmonary:      Effort: Pulmonary effort is normal  No respiratory distress  Breath sounds: Normal breath sounds  No wheezing, rhonchi or rales  Chest:      Comments: HD catheter in R upper chest   Abdominal:      General: Bowel sounds are normal  There is no distension  Palpations: Abdomen is soft  Tenderness: There is no abdominal tenderness  There is no guarding  Musculoskeletal: Normal range of motion  Right lower leg: Edema (trace) present  Left lower leg: Edema (trace) present  Lymphadenopathy:      Cervical: No cervical adenopathy  Skin:     General: Skin is warm and dry  Coloration: Skin is pale  Neurological:      General: No focal deficit present  Mental Status: He is alert and oriented to person, place, and time     Psychiatric:         Mood and Affect: Mood normal          Behavior: Behavior normal  ED Medications  Medications - No data to display    Diagnostic Studies  Results Reviewed     Procedure Component Value Units Date/Time    Comprehensive metabolic panel [[id number]]  (Abnormal) Collected: 01/03/21 1520    Lab Status: Final result Specimen: Blood from Arm, Right Updated: 01/03/21 1604     Sodium 134 mmol/L      Potassium 4 2 mmol/L      Chloride 99 mmol/L      CO2 29 mmol/L      ANION GAP 6 mmol/L      BUN 19 mg/dL      Creatinine 5 65 mg/dL      Glucose 251 mg/dL      Calcium 8 5 mg/dL      Corrected Calcium 9 1 mg/dL      AST 13 U/L      ALT 13 U/L      Alkaline Phosphatase 92 U/L      Total Protein 6 5 g/dL      Albumin 3 3 g/dL      Total Bilirubin 0 45 mg/dL      eGFR 12 ml/min/1 73sq m     Narrative:      National Kidney Disease Foundation guidelines for Chronic Kidney Disease (CKD):     Stage 1 with normal or high GFR (GFR > 90 mL/min/1 73 square meters)    Stage 2 Mild CKD (GFR = 60-89 mL/min/1 73 square meters)    Stage 3A Moderate CKD (GFR = 45-59 mL/min/1 73 square meters)    Stage 3B Moderate CKD (GFR = 30-44 mL/min/1 73 square meters)    Stage 4 Severe CKD (GFR = 15-29 mL/min/1 73 square meters)    Stage 5 End Stage CKD (GFR <15 mL/min/1 73 square meters)  Note: GFR calculation is accurate only with a steady state creatinine    CBC and differential [[id number]]  (Abnormal) Collected: 01/03/21 1520    Lab Status: Final result Specimen: Blood from Arm, Right Updated: 01/03/21 1529     WBC 4 18 Thousand/uL      RBC 2 56 Million/uL      Hemoglobin 7 9 g/dL      Hematocrit 24 5 %      MCV 96 fL      MCH 30 9 pg      MCHC 32 2 g/dL      RDW 15 3 %      MPV 9 6 fL      Platelets 276 Thousands/uL      nRBC 0 /100 WBCs      Neutrophils Relative 61 %      Immat GRANS % 0 %      Lymphocytes Relative 20 %      Monocytes Relative 6 %      Eosinophils Relative 12 %      Basophils Relative 1 %      Neutrophils Absolute 2 52 Thousands/µL      Immature Grans Absolute 0 01 Thousand/uL Lymphocytes Absolute 0 85 Thousands/µL      Monocytes Absolute 0 24 Thousand/µL      Eosinophils Absolute 0 51 Thousand/µL      Basophils Absolute 0 05 Thousands/µL                  No orders to display           ED Course                                       MDM  Number of Diagnoses or Management Options  Diagnosis management comments: Pt presented due to hemoglobin of 6 8 at dialysis this morning  No significant symptoms  Pt's Hgb typically around 7 and was 7 9 in ED today  Pt reports receiving Epogen at dialysis  Nephrology was contacted and agreeable to discharging pt  Amount and/or Complexity of Data Reviewed  Clinical lab tests: reviewed  Tests in the medicine section of CPT®: reviewed  Discuss the patient with other providers: yes    Risk of Complications, Morbidity, and/or Mortality  Presenting problems: moderate  Diagnostic procedures: moderate  Management options: low    Patient Progress  Patient progress: stable      Disposition  Final diagnoses:   Anemia     Time reflects when diagnosis was documented in both MDM as applicable and the Disposition within this note     Time User Action Codes Description Comment    1/3/2021  4:26 PM Alpha Hail Add [D64 9] Anemia       ED Disposition     ED Disposition Condition Date/Time Comment    Discharge Stable Sun Eliezer 3, 2021  4:25 PM Malika 354 discharge to home/self care  Follow-up Information    None         Patient's Medications   Discharge Prescriptions    No medications on file     No discharge procedures on file  PDMP Review       Value Time User    PDMP Reviewed  Yes 11/11/2020 10:46 AM Felipe Rodríguez PA-C           ED Provider  Attending physically available and evaluated [address]  I managed the patient along with the ED Attending      Electronically Signed by         Goyo Aguilar MD  01/03/21 4781

## 2021-01-05 ENCOUNTER — OFFICE VISIT (OUTPATIENT)
Dept: INTERNAL MEDICINE CLINIC | Facility: CLINIC | Age: 36
End: 2021-01-05
Payer: MEDICARE

## 2021-01-05 VITALS
OXYGEN SATURATION: 99 % | DIASTOLIC BLOOD PRESSURE: 80 MMHG | TEMPERATURE: 98.4 F | RESPIRATION RATE: 16 BRPM | HEART RATE: 75 BPM | WEIGHT: 222 LBS | BODY MASS INDEX: 31.08 KG/M2 | HEIGHT: 71 IN | SYSTOLIC BLOOD PRESSURE: 142 MMHG

## 2021-01-05 DIAGNOSIS — Z99.2 ANEMIA IN CHRONIC KIDNEY DISEASE, ON CHRONIC DIALYSIS (HCC): Primary | ICD-10-CM

## 2021-01-05 DIAGNOSIS — N18.6 ANEMIA IN CHRONIC KIDNEY DISEASE, ON CHRONIC DIALYSIS (HCC): Primary | ICD-10-CM

## 2021-01-05 DIAGNOSIS — N18.6 ESRD (END STAGE RENAL DISEASE) (HCC): ICD-10-CM

## 2021-01-05 DIAGNOSIS — I15.0 RENOVASCULAR HYPERTENSION: ICD-10-CM

## 2021-01-05 DIAGNOSIS — D63.1 ANEMIA IN CHRONIC KIDNEY DISEASE, ON CHRONIC DIALYSIS (HCC): Primary | ICD-10-CM

## 2021-01-05 PROBLEM — D64.9 ANEMIA: Status: RESOLVED | Noted: 2018-05-01 | Resolved: 2021-01-05

## 2021-01-05 PROBLEM — R10.12 LUQ ABDOMINAL PAIN: Status: RESOLVED | Noted: 2020-11-04 | Resolved: 2021-01-05

## 2021-01-05 PROBLEM — R79.89 ELEVATED TSH: Status: RESOLVED | Noted: 2019-12-07 | Resolved: 2021-01-05

## 2021-01-05 PROBLEM — A41.9 SEPSIS (HCC): Status: RESOLVED | Noted: 2019-12-11 | Resolved: 2021-01-05

## 2021-01-05 PROBLEM — R10.9 ABDOMINAL PAIN: Status: RESOLVED | Noted: 2020-11-13 | Resolved: 2021-01-05

## 2021-01-05 PROBLEM — L72.9 SCALP CYST: Status: RESOLVED | Noted: 2020-03-12 | Resolved: 2021-01-05

## 2021-01-05 PROCEDURE — 99214 OFFICE O/P EST MOD 30 MIN: CPT | Performed by: INTERNAL MEDICINE

## 2021-01-05 RX ORDER — TORSEMIDE 100 MG/1
100 TABLET ORAL DAILY
COMMUNITY

## 2021-01-05 RX ORDER — METOLAZONE 10 MG/1
5 TABLET ORAL DAILY
COMMUNITY

## 2021-01-05 RX ORDER — ONDANSETRON 4 MG/1
4 TABLET, FILM COATED ORAL EVERY 8 HOURS PRN
COMMUNITY
End: 2022-05-16 | Stop reason: SDUPTHER

## 2021-01-05 RX ORDER — MELATONIN
2000 DAILY
COMMUNITY

## 2021-01-05 NOTE — PROGRESS NOTES
Assessment/Plan:    Problem List Items Addressed This Visit        Cardiovascular and Mediastinum    Renovascular hypertension     -home BP improved without significant symptoms  -continue on clonidine 0 3mg patch, hydralazine 50mg bid, lisinopril 20mg at dinner, labetalol 300mg bid, nifedipine 60mg bid and doxazosin 2mg qHS  -follow up with Nephro         Relevant Medications    metolazone (ZAROXOLYN) 10 mg tablet    torsemide (DEMADEX) 100 mg tablet       Genitourinary    Anemia in chronic kidney disease, on chronic dialysis ([facility] ) - Primary     -received Epo 1/3  -goal Hb>7 before transfusing  -looks clinically well  -follow up with Nephro         Relevant Medications    metolazone (ZAROXOLYN) 10 mg tablet    torsemide (DEMADEX) 100 mg tablet    ESRD (end stage renal disease) ([facility] )     -on HD MWF         Relevant Medications    metolazone (ZAROXOLYN) 10 mg tablet    torsemide (DEMADEX) 100 mg tablet          Subjective:      Patient ID: Trent Goode is a 38 y o  male  HPI  44yo male with DM1 with ESRD, neuropathy, gastroparesis, renovascular HTN, MDD,  Vitamin D def with h/o CVA here for hospital follow up  He was hospitalized at [facility] 12/14-/12/17/20 for hypertensive urgency  Cardene infusion was initiated and home meds were clonidine 0 1mg bid, hydralazine 50mg bid, lisinopril 20mg at dinner, labetalol 300mg bid, nefidepine 60mg bid and started on doxazosin 2mg qHS  He then presented to [facility] 12/26/20 due to outpatient Hb of 6 8, with repeat at 7 4  Nephro recommend transfusion for Hb of <7  He presented again at [facility] 1/3/21 for Hb of 6 8 at dialysis despite epogen  Repeat Hb at 7 9  When he had last lab drawn 1/3 he felt fatigued and was noted to have   Home BP since has been around 140/80s, sometimes sbp 180-190  He used to take clonidine prn for high SBP but is now on clonidine patch 0 3mg  He has chronic dizziness, Denies HA    Energy is ok today     Overall he is happier on HD  Reports gastroparesis is improved, less nausea off PD  The following portions of the patient's history were reviewed and updated as appropriate: allergies, current medications, past family history, past medical history, past social history, past surgical history and problem list       Current Outpatient Medications:     aspirin 81 mg chewable tablet, Chew 81 mg daily, Disp: , Rfl:     atorvastatin (LIPITOR) 40 mg tablet, Take 1 tablet (40 mg total) by mouth every evening, Disp: 90 tablet, Rfl: 1    b complex vitamins capsule, Take 1 capsule by mouth daily before lunch , Disp: , Rfl:     B-D ULTRAFINE III SHORT PEN 31G X 8 MM MISC, USE 1 PEN NEEDLE 8 TIMES DAILY, Disp: 100 each, Rfl: 47    cholecalciferol (VITAMIN D3) 1,000 units tablet, Take 1,000 Units by mouth daily, Disp: , Rfl:     cinacalcet (SENSIPAR) 60 MG tablet, Take 1 tablet (60 mg total) by mouth every other day, Disp: 30 tablet, Rfl: 0    cloNIDine (CATAPRES) 0 1 mg tablet, Take 1 tablet (0 1 mg total) by mouth every 12 (twelve) hours, Disp: 60 tablet, Rfl: 0    doxazosin (CARDURA) 2 mg tablet, Take 1 tablet (2 mg total) by mouth daily at bedtime (Patient taking differently: Take 2 mg by mouth daily ), Disp: 30 tablet, Rfl: 0    escitalopram (LEXAPRO) 10 mg tablet, Take 1 tablet (10 mg total) by mouth daily, Disp: 90 tablet, Rfl: 2    famotidine (PEPCID) 40 MG tablet, Take 1 tablet (40 mg total) by mouth 2 (two) times a day, Disp: 60 tablet, Rfl: 5    gabapentin (NEURONTIN) 100 mg capsule, Take 100 mg by mouth daily at bedtime, Disp: , Rfl: 1    GLUCAGON EMERGENCY 1 MG injection, INJECT 1MG SUBCUTANEOUSLY AS NEEDED (AS DIRECTED)   MAY REPEAT DOSE EVERY 20 MINUTES AS NEEDED, Disp: , Rfl: 3    hydrALAZINE (APRESOLINE) 100 MG tablet, Take 0 5 tablets (50 mg total) by mouth 2 (two) times a day (Patient taking differently: Take 50 mg by mouth 50mg in the am ad 75mg at night), Disp: 90 tablet, Rfl: 1   insulin glargine (Basaglar KwikPen) 100 units/mL injection pen, Inject 14 Units under the skin daily at bedtime (Patient taking differently: Inject 15 Units under the skin daily at bedtime ), Disp: , Rfl:     insulin lispro (HumaLOG) 100 units/mL injection, Inject 4 Units under the skin 3 (three) times a day with meals (Patient taking differently: Inject 4 Units under the skin 3 (three) times a day with meals Takes up to 5 times a day), Disp: 10 mL, Rfl: 0    labetalol (NORMODYNE) 300 mg tablet, Take 1 tablet (300 mg total) by mouth every 12 (twelve) hours (Patient taking differently: Take 300 mg by mouth daily ), Disp: 60 tablet, Rfl: 0    lisinopril (ZESTRIL) 20 mg tablet, Take 1 tablet (20 mg total) by mouth daily, Disp: 30 tablet, Rfl: 0    loperamide (IMODIUM) 2 mg capsule, Take 1 capsule (2 mg total) by mouth 3 (three) times a day as needed for diarrhea, Disp: 30 capsule, Rfl: 0    metolazone (ZAROXOLYN) 10 mg tablet, Take 10 mg by mouth daily, Disp: , Rfl:     NIFEdipine ER (ADALAT CC) 60 MG 24 hr tablet, Take 1 tablet (60 mg total) by mouth 2 (two) times a day, Disp: 180 tablet, Rfl: 0    ondansetron (ZOFRAN) 4 mg tablet, Take 4 mg by mouth every 8 (eight) hours as needed for nausea or vomiting, Disp: , Rfl:     saccharomyces boulardii (FLORASTOR) 250 mg capsule, Take 1 capsule (250 mg total) by mouth 2 (two) times a day, Disp: 60 capsule, Rfl: 0    sevelamer (RENAGEL) 800 mg tablet, Take 3 tablets (2,400 mg total) by mouth 3 (three) times a day with meals, Disp: 270 tablet, Rfl: 0    torsemide (DEMADEX) 100 mg tablet, Take 100 mg by mouth daily, Disp: , Rfl:     Velphoro 500 MG CHEW, CHEW AND SWALLOW 1 TABLET BY MOUTH WITH MEALS (UP TO THREE TIMES DAILY)  , Disp: , Rfl:     bismuth subsalicylate (PEPTO BISMOL) 524 mg/30 mL oral suspension, Take 15 mL (262 mg total) by mouth 2 (two) times a day as needed for diarrhea (Patient not taking: Reported on 1/5/2021), Disp: 177 mL, Rfl: 0    calcium acetate (CALPHRON) 667 mg, TAKE 3 TABLETS BY MOUTH WITH MEALS, Disp: , Rfl:     dicyclomine (BENTYL) 20 mg tablet, Take 1 tablet (20 mg total) by mouth 3 (three) times a day as needed (abdominal cramps) (Patient not taking: Reported on 1/5/2021), Disp: 20 tablet, Rfl: 0    Review of Systems   Constitutional: Positive for activity change and appetite change  Negative for fever and unexpected weight change (fluctuates)  Respiratory: Negative for cough, chest tightness, shortness of breath and wheezing  Cardiovascular: Negative for chest pain, palpitations and leg swelling  Gastrointestinal: Positive for nausea (much reduced)  Negative for abdominal pain, blood in stool, diarrhea and vomiting  Neurological: Positive for dizziness  Negative for headaches  Objective:    /80 (BP Location: Left arm, Patient Position: Sitting)   Pulse 75   Temp 98 4 °F (36 9 °C) (Tympanic)   Resp 16   Ht 5' 11" (1 803 m)   Wt 101 kg (222 lb)   SpO2 99%   BMI 30 96 kg/m²      Physical Exam  Vitals signs reviewed  Constitutional:       General: He is not in acute distress  Appearance: Normal appearance  He is obese  Cardiovascular:      Rate and Rhythm: Normal rate and regular rhythm  Pulses: Normal pulses  Heart sounds: Normal heart sounds  Pulmonary:      Effort: Pulmonary effort is normal  No respiratory distress  Breath sounds: Normal breath sounds  No wheezing  Musculoskeletal:      Right lower leg: Edema (1+ pitting edema) present  Left lower leg: Edema (1+ pitting edema) present  Comments: L arm AVF  R anterior chest wall port   Neurological:      Mental Status: He is alert and oriented to person, place, and time  Psychiatric:         Attention and Perception: Attention normal          Mood and Affect: Mood and affect normal          Speech: Speech normal          Behavior: Behavior is cooperative

## 2021-01-05 NOTE — ASSESSMENT & PLAN NOTE
-home BP improved without significant symptoms  -continue on clonidine 0 3mg patch, hydralazine 50mg bid, lisinopril 20mg at dinner, labetalol 300mg bid, nifedipine 60mg bid and doxazosin 2mg qHS    -follow up with Nephro

## 2021-01-06 DIAGNOSIS — E10.42 DIABETIC POLYNEUROPATHY ASSOCIATED WITH TYPE 1 DIABETES MELLITUS (HCC): Primary | ICD-10-CM

## 2021-01-07 ENCOUNTER — REMOTE DEVICE CLINIC VISIT (OUTPATIENT)
Dept: CARDIOLOGY CLINIC | Facility: CLINIC | Age: 36
End: 2021-01-07
Payer: MEDICARE

## 2021-01-07 DIAGNOSIS — Z95.818 PRESENCE OF OTHER CARDIAC IMPLANTS AND GRAFTS: Primary | ICD-10-CM

## 2021-01-07 PROCEDURE — G2066 INTER DEVC REMOTE 30D: HCPCS | Performed by: INTERNAL MEDICINE

## 2021-01-07 PROCEDURE — 93298 REM INTERROG DEV EVAL SCRMS: CPT | Performed by: INTERNAL MEDICINE

## 2021-01-07 RX ORDER — GABAPENTIN 100 MG/1
100 CAPSULE ORAL
Qty: 30 CAPSULE | Refills: 5 | Status: SHIPPED | OUTPATIENT
Start: 2021-01-07 | End: 2021-01-22 | Stop reason: SDUPTHER

## 2021-01-07 NOTE — PROGRESS NOTES
MDT LOOP   CARELINK TRANSMISSION: LOOP RECORDER  PRESENTING RHYTHM NSR @ 76 BPM  BATTERY STATUS "OK"  NO PATIENT OR DEVICE ACTIVATED EPISODES  NORMAL DEVICE FUNCTION   DL

## 2021-01-18 ENCOUNTER — OFFICE VISIT (OUTPATIENT)
Dept: GASTROENTEROLOGY | Facility: AMBULARY SURGERY CENTER | Age: 36
End: 2021-01-18
Payer: MEDICARE

## 2021-01-18 VITALS
BODY MASS INDEX: 30.83 KG/M2 | SYSTOLIC BLOOD PRESSURE: 152 MMHG | WEIGHT: 220.2 LBS | DIASTOLIC BLOOD PRESSURE: 64 MMHG | HEIGHT: 71 IN

## 2021-01-18 DIAGNOSIS — K31.84 GASTROPARESIS DIABETICORUM (HCC): ICD-10-CM

## 2021-01-18 DIAGNOSIS — D12.6 TUBULOVILLOUS ADENOMA OF COLON: Primary | ICD-10-CM

## 2021-01-18 DIAGNOSIS — K21.9 GASTROESOPHAGEAL REFLUX DISEASE WITHOUT ESOPHAGITIS: ICD-10-CM

## 2021-01-18 DIAGNOSIS — E11.43 GASTROPARESIS DIABETICORUM (HCC): ICD-10-CM

## 2021-01-18 PROCEDURE — 99214 OFFICE O/P EST MOD 30 MIN: CPT | Performed by: PHYSICIAN ASSISTANT

## 2021-01-18 RX ORDER — SEVELAMER CARBONATE 800 MG/1
TABLET, FILM COATED ORAL
COMMUNITY
End: 2021-08-16

## 2021-01-18 RX ORDER — CLONIDINE 0.3 MG/24H
1 PATCH, EXTENDED RELEASE TRANSDERMAL WEEKLY
COMMUNITY

## 2021-01-18 RX ORDER — HUMAN INSULIN 100 [IU]/ML
INJECTION, SUSPENSION SUBCUTANEOUS
COMMUNITY
End: 2021-02-16

## 2021-01-18 NOTE — ASSESSMENT & PLAN NOTE
See "GERD" for plan  Appears well managed at this time      Lab Results   Component Value Date    HGBA1C 6 9 (H) 11/13/2020

## 2021-01-18 NOTE — ASSESSMENT & PLAN NOTE
Patient had a large tubulovillous adenoma removed from his colon at the time of colonoscopy in October 2020, prep was noted relatively poor, should assess for any other adenomatous lesions or any recurrence of adenomatous changes at the polypectomy site    No alarm symptoms at this time; he actually reports significant improvement in his diarrhea and other GI symptoms after he was transitioned to hemodialysis from peritoneal dialysis in the fall    -plan for repeat colonoscopy in the next 1-2 months, as previously recommended    -patient was advised regarding risks and benefits of the procedure, risks including but not limited to infection, perforation and bleeding    - prescription instructions were provided for colonoscopy prep

## 2021-01-18 NOTE — PROGRESS NOTES
Follow-up Note -  [facility]  Mckenzie Vanessa 1985 38 y o  male         Reason:  Follow-up; tubulovillous adenoma of the colon, GERD, diarrhea    HPI:  49-year-old male with history of type 1 diabetes, hemodialysis, gastroparesis who presents for follow-up  He was last seen in our office with Dr Neil Manzanares in September, after a recent EGD with pyloric Botox injection during hospitalization  At the time of September office visit he reported feeling relatively well with occasional episodes of nausea and vomiting after eating certain foods  He did report having chronic diarrhea for almost a year though, so he was planned for colonoscopy although this is felt to most likely represent functional process or diabetic autonomic neuropathy  The patient did not have stool studies done, but he says that his diarrhea and most of his gastrointestinal symptoms actually vastly improved after transition from peritoneal dialysis to hemodialysis at the end of November  He says he is now having formed bowel movements about once or twice a day, with no bleeding or straining  Denies any significant acid reflux, he is wondering if he can taper down or discontinue Pepcid, which he is taking twice daily at this point  His EGD in August showed moderate gastritis and mild esophagitis with no Leyva's  His colonoscopy was done in October, and although prep was noted relatively poor, there was noted to be a 1 5 cm polyp removed, which was found to be tubulovillous adenoma on biopsy, he was recommended for follow-up colonoscopy in 3 months  REVIEW OF SYSTEMS:      CONSTITUTIONAL: Denies any fever, chills, or rigors  Good appetite, and no recent weight loss  HEENT: No earache or tinnitus  Denies hearing loss or visual disturbances  CARDIOVASCULAR: No chest pain or palpitations  RESPIRATORY: Denies any cough, hemoptysis, shortness of breath or dyspnea on exertion    GASTROINTESTINAL: As noted in the History of Present Illness  GENITOURINARY: No problems with urination  Denies any hematuria or dysuria  NEUROLOGIC: No dizziness or vertigo, denies headaches  MUSCULOSKELETAL: Denies any muscle or joint pain  SKIN: Denies skin rashes or itching  ENDOCRINE: Denies excessive thirst  Denies intolerance to heat or cold  PSYCHOSOCIAL: Denies depression or anxiety  Denies any recent memory loss  Past Medical History:   Diagnosis Date    [facility] kidney injury ([facility] )     Ambulates with cane     Anuria     Anxiety     Chronic kidney disease     Depression     Diabetes mellitus ([facility] )     Diarrhea     Falls     Gastroparesis     GERD (gastroesophageal reflux disease)     History of shingles 2010    History of transfusion 02/2018    no adverse reaction    Hyperlipidemia     Hyperphosphatemia     Hypertension     Itching     Muscle weakness     general unsteadiness    Retinopathy     Seizures ([place] [id number] )     early 2020 - one time    Skin abnormality     some dime size areas where skin was scratched from itching    Stroke ([place] [id number] )     x2 - off balance/no driving/fatigue    Swelling of both lower extremities     Vomiting     Wears glasses       Past Surgical History:   Procedure Laterality Date    CARDIAC LOOP RECORDER  05/2018    COLONOSCOPY      EGD      EYE SURGERY Right     IR TUNNELED DIALYSIS CATHETER PLACEMENT  11/18/2020    PERITONEAL CATHETER INSERTION N/A 8/27/2018    Procedure: UNROOF PD CATHETER;  Surgeon: Brit Guevara DO;  [facility];  Service: General    [place] ANASTOMOSIS,AV,ANY SITE Left 11/9/2020    Procedure: CREATION FISTULA  ARTERIOVENOUS (AV) - LEFT WRIST;  Surgeon: Alberto Romo MD;  [facility];  Service: Vascular    [place] ESOPHAGOGASTRODUODENOSCOPY TRANSORAL DIAGNOSTIC N/A 4/18/2019    Procedure: ESOPHAGOGASTRODUODENOSCOPY (EGD); Surgeon: Kristal Arreaga MD;  [facility];   Service: Gastroenterology    [place] LAP INSERTION TUNNELED INTRAPERITONEAL CATHETER N/A 2018    Procedure: LAPAROSCOPIC PD CATHETER PLACEMENT;  Surgeon: Jody Vargas DO;  [facility];  Service: General    ID REMOVAL TUNNELED INTRAPERITONEAL CATHETER N/A 2020    Procedure: REMOVAL CATHETER PERITONEAL DIALYSIS;  Surgeon: Sharon Clements MD;  [facility];  Service: General    TONSILLECTOMY      UPPER GASTROINTESTINAL ENDOSCOPY       Social History     Socioeconomic History    Marital status: Single     Spouse name: Not on file    Number of children: Not on file    Years of education: Not on file    Highest education level: Not on file   Occupational History    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   Social Needs    Financial resource strain: Not on file    Food insecurity     Worry: Not on file     Inability: Not on file    Transportation needs     Medical: No     Non-medical: No   Tobacco Use    Smoking status: Former Smoker     Packs/day: 0 50     Years: 12 00     Pack years: 6 00     Types: Cigarettes     Quit date: 2018     Years since quittin 9    Smokeless tobacco: Never Used    Tobacco comment: quit 2018   Substance and Sexual Activity    Alcohol use: Not Currently    Drug use: Yes     Types: Marijuana     Comment: medical marijuana    Sexual activity: Not on file   Lifestyle    Physical activity     Days per week: Not on file     Minutes per session: Not on file    Stress: Not on file   Relationships    Social connections     Talks on phone: Not on file     Gets together: Not on file     Attends Zoroastrianism service: Not on file     Active member of club or organization: Not on file     Attends meetings of clubs or organizations: Not on file     Relationship status: Not on file    Intimate partner violence     Fear of current or ex partner: Not on file     Emotionally abused: Not on file     Physically abused: Not on file     Forced sexual activity: Not on file   Other Topics Concern    Not on file   Social History Narrative    Caffeine use    single     Family History   Problem Relation Age of Onset   Savana Nunez Breast cancer Mother     Hypertension Mother     Hyperlipidemia Father     Hypertension Father     Leukemia Maternal Grandmother     Hyperlipidemia Maternal Grandfather     Hypertension Maternal Grandfather     Hyperlipidemia Paternal Grandmother     Hypertension Paternal Grandmother     Heart disease Paternal Grandfather         cardiac disorder    Diabetes Paternal Grandfather      Sulfa antibiotics  Current Outpatient Medications   Medication Sig Dispense Refill    aspirin 81 mg chewable tablet Chew 81 mg daily      atorvastatin (LIPITOR) 40 mg tablet Take 1 tablet (40 mg total) by mouth every evening 90 tablet 1    b complex vitamins capsule Take 1 capsule by mouth daily before lunch       B-D ULTRAFINE III SHORT PEN 31G X 8 MM MISC USE 1 PEN NEEDLE 8 TIMES DAILY 100 each 47    cholecalciferol (VITAMIN D3) 1,000 units tablet Take 1,000 Units by mouth daily      cinacalcet (SENSIPAR) 60 MG tablet Take 1 tablet (60 mg total) by mouth every other day 30 tablet 0    cloNIDine (CATAPRES-TTS-3) 0 3 mg/24 hr clonidine 0 3 mg/24 hr weekly transdermal patch   APPLY 1 PATCH TOPICALLY ONCE A WEEK      escitalopram (LEXAPRO) 10 mg tablet Take 1 tablet (10 mg total) by mouth daily 90 tablet 2    famotidine (PEPCID) 40 MG tablet Take 1 tablet (40 mg total) by mouth 2 (two) times a day 60 tablet 5    gabapentin (NEURONTIN) 100 mg capsule Take 1 capsule (100 mg total) by mouth daily at bedtime 30 capsule 5    GLUCAGON EMERGENCY 1 MG injection INJECT 1MG SUBCUTANEOUSLY AS NEEDED (AS DIRECTED)   MAY REPEAT DOSE EVERY 20 MINUTES AS NEEDED  3    hydrALAZINE (APRESOLINE) 100 MG tablet Take 0 5 tablets (50 mg total) by mouth 2 (two) times a day (Patient taking differently: Take 50 mg by mouth 50mg in the am ad 75mg at night) 90 tablet 1    insulin glargine (Basaglar KwikPen) 100 units/mL injection pen Inject 14 Units under the skin daily at bedtime (Patient taking differently: Inject 15 Units under the skin daily at bedtime )      insulin isophane (NovoLIN N FlexPen) 100 units/mL injection pen Novolin N Flexpen 100 unit/mL (3 mL) subcutaneous insulin pen   INJECT 4 UNITS SUBCUTANEOUSLY NIGHTLY      insulin lispro (HumaLOG) 100 units/mL injection Inject 4 Units under the skin 3 (three) times a day with meals (Patient taking differently: Inject 4 Units under the skin 3 (three) times a day with meals Takes up to 5 times a day) 10 mL 0    labetalol (NORMODYNE) 300 mg tablet Take 1 tablet (300 mg total) by mouth every 12 (twelve) hours (Patient taking differently: Take 300 mg by mouth daily ) 60 tablet 0    lisinopril (ZESTRIL) 20 mg tablet Take 1 tablet (20 mg total) by mouth daily 30 tablet 0    metolazone (ZAROXOLYN) 10 mg tablet Take 10 mg by mouth daily      NIFEdipine ER (ADALAT CC) 60 MG 24 hr tablet Take 1 tablet (60 mg total) by mouth 2 (two) times a day 180 tablet 0    ondansetron (ZOFRAN) 4 mg tablet Take 4 mg by mouth every 8 (eight) hours as needed for nausea or vomiting      saccharomyces boulardii (FLORASTOR) 250 mg capsule Take 1 capsule (250 mg total) by mouth 2 (two) times a day 60 capsule 0    sevelamer (RENAGEL) 800 mg tablet Take 3 tablets (2,400 mg total) by mouth 3 (three) times a day with meals 270 tablet 0    sevelamer carbonate (Renvela) 800 mg tablet Renvela 800 mg tablet   TAKE 3 TABLETS BY MOUTH THREE TIMES DAILY WITH MEALS      torsemide (DEMADEX) 100 mg tablet Take 100 mg by mouth daily      bisacodyl (DULCOLAX) 5 mg EC tablet Take 2 tablets (10 mg total) by mouth once for 1 dose 2 tablet 0    bismuth subsalicylate (PEPTO BISMOL) 524 mg/30 mL oral suspension Take 15 mL (262 mg total) by mouth 2 (two) times a day as needed for diarrhea (Patient not taking: Reported on 1/5/2021) 177 mL 0    calcium acetate (CALPHRON) 667 mg TAKE 3 TABLETS BY MOUTH WITH MEALS      cloNIDine (CATAPRES) 0 1 mg tablet Take 1 tablet (0 1 mg total) by mouth every 12 (twelve) hours (Patient not taking: Reported on 1/18/2021) 60 tablet 0    dicyclomine (BENTYL) 20 mg tablet Take 1 tablet (20 mg total) by mouth 3 (three) times a day as needed (abdominal cramps) (Patient not taking: Reported on 1/5/2021) 20 tablet 0    doxazosin (CARDURA) 2 mg tablet Take 1 tablet (2 mg total) by mouth daily at bedtime (Patient not taking: Reported on 1/18/2021) 30 tablet 0    loperamide (IMODIUM) 2 mg capsule Take 1 capsule (2 mg total) by mouth 3 (three) times a day as needed for diarrhea (Patient not taking: Reported on 1/18/2021) 30 capsule 0    polyethylene glycol (GOLYTELY) 4000 mL solution Take 4,000 mL by mouth once for 1 dose 4000 mL 0    Velphoro 500 MG CHEW CHEW AND SWALLOW 1 TABLET BY MOUTH WITH MEALS (UP TO THREE TIMES DAILY)  No current facility-administered medications for this visit  Blood pressure 152/64, height 5' 11" (1 803 m), weight 99 9 kg (220 lb 3 2 oz)  PHYSICAL EXAM:      General Appearance:   Alert, cooperative, no distress, appears stated age    HEENT:   Normocephalic, atraumatic, anicteric      Neck:  Supple, symmetrical, trachea midline, no adenopathy;    thyroid: no enlargement/tenderness/nodules; no carotid  bruit or JVD    Lungs:   Clear to auscultation bilaterally; no rales, rhonchi or wheezing; respirations unlabored    Heart[de-identified]   S1 and S2 normal; regular rate and rhythm; no murmur, rub, or gallop     Abdomen:   Soft, non-tender, non-distended; normal bowel sounds; no masses, no organomegaly    Extremities: No edema, erythema, wounds, rashes   Rectal:   Deferred                      Lab Results   Component Value Date    WBC 4 18 (L) 01/03/2021    HGB 7 9 (L) 01/03/2021    HCT 24 5 (L) 01/03/2021    MCV 96 01/03/2021     01/03/2021     Lab Results   Component Value Date    GLUCOSE 275 (H) 02/21/2020    CALCIUM 8 5 01/03/2021     10/28/2015    K 4 2 01/03/2021    CO2 29 01/03/2021    CL 99 (L) 01/03/2021    BUN 19 01/03/2021    CREATININE 5 65 (H) 01/03/2021     Lab Results   Component Value Date    ALT 13 01/03/2021    AST 13 01/03/2021    ALKPHOS 92 01/03/2021    BILITOT 0 49 10/28/2015     Lab Results   Component Value Date    INR 1 01 11/13/2020    INR 1 19 06/19/2020    INR 1 12 06/19/2020    PROTIME 13 4 11/13/2020    PROTIME 14 5 06/19/2020    PROTIME 13 8 06/19/2020       No results found  ASSESSMENT & PLAN:    Tubulovillous adenoma of colon  Patient had a large tubulovillous adenoma removed from his colon at the time of colonoscopy in October 2020, prep was noted relatively poor, should assess for any other adenomatous lesions or any recurrence of adenomatous changes at the polypectomy site  No alarm symptoms at this time; he actually reports significant improvement in his diarrhea and other GI symptoms after he was transitioned to hemodialysis from peritoneal dialysis in the fall    -plan for repeat colonoscopy in the next 1-2 months, as previously recommended    -patient was advised regarding risks and benefits of the procedure, risks including but not limited to infection, perforation and bleeding    - prescription instructions were provided for colonoscopy prep    Gastroesophageal reflux disease without esophagitis  Appears to be well controlled at this time, patient had moderate gastritis and mild esophagitis noted at the time of EGD in August 2020   Patient is enquiring if he can taper or discontinue H2 blocker therapy; given absence of Leyva's or peptic ulcer disease on the last exam, and patient's relatively good clinical status at this time, I think this is reasonable    -advised patient he can decrease his use of Pepcid once daily for 3-4 weeks, if he is still doing well with with respect to GERD/dyspepsia symptoms, he can then discontinue the medication entirely after that point    -advised patient about avoidance of GERD triggers, also reaffirmed about dietary changes for management of gastroparesis including small frequent meals, exercising caution with foods high in roughage and fat    -continue follow-up with primary care provider with regards to management of underlying diabetes, which will be of paramount importance in the management of gastroparesis     Gastroparesis diabeticorum ([facility] [address] )  See "GERD" for plan  Appears well managed at this time      Lab Results   Component Value Date    HGBA1C 6 9 (H) 11/13/2020

## 2021-01-18 NOTE — ASSESSMENT & PLAN NOTE
Appears to be well controlled at this time, patient had moderate gastritis and mild esophagitis noted at the time of EGD in August 2020   Patient is enquiring if he can taper or discontinue H2 blocker therapy; given absence of Leyva's or peptic ulcer disease on the last exam, and patient's relatively good clinical status at this time, I think this is reasonable    -advised patient he can decrease his use of Pepcid once daily for 3-4 weeks, if he is still doing well with with respect to GERD/dyspepsia symptoms, he can then discontinue the medication entirely after that point    -advised patient about avoidance of GERD triggers, also reaffirmed about dietary changes for management of gastroparesis including small frequent meals, exercising caution with foods high in roughage and fat    -continue follow-up with primary care provider with regards to management of underlying diabetes, which will be of paramount importance in the management of gastroparesis

## 2021-01-19 ENCOUNTER — HOSPITAL ENCOUNTER (OUTPATIENT)
Dept: VASCULAR ULTRASOUND | Facility: HOSPITAL | Age: 36
Discharge: HOME | End: 2021-01-19
Attending: SURGERY
Payer: MEDICARE

## 2021-01-19 DIAGNOSIS — E10.22 END STAGE RENAL DISEASE ON DIALYSIS DUE TO TYPE 1 DIABETES MELLITUS (HCC): ICD-10-CM

## 2021-01-19 DIAGNOSIS — N18.6 END STAGE RENAL DISEASE ON DIALYSIS DUE TO TYPE 1 DIABETES MELLITUS (HCC): ICD-10-CM

## 2021-01-19 DIAGNOSIS — Z99.2 END STAGE RENAL DISEASE ON DIALYSIS DUE TO TYPE 1 DIABETES MELLITUS (HCC): ICD-10-CM

## 2021-01-19 PROCEDURE — 93990 DOPPLER FLOW TESTING: CPT

## 2021-01-20 PROCEDURE — 93990 DOPPLER FLOW TESTING: CPT | Performed by: SURGERY

## 2021-01-21 ENCOUNTER — OFFICE VISIT (OUTPATIENT)
Dept: VASCULAR SURGERY | Facility: CLINIC | Age: 36
End: 2021-01-21

## 2021-01-21 VITALS
BODY MASS INDEX: 29.82 KG/M2 | RESPIRATION RATE: 18 BRPM | WEIGHT: 213 LBS | HEIGHT: 71 IN | HEART RATE: 72 BPM | DIASTOLIC BLOOD PRESSURE: 84 MMHG | SYSTOLIC BLOOD PRESSURE: 142 MMHG

## 2021-01-21 DIAGNOSIS — N18.6 ESRD (END STAGE RENAL DISEASE) (HCC): Primary | ICD-10-CM

## 2021-01-21 PROCEDURE — 99024 POSTOP FOLLOW-UP VISIT: CPT | Performed by: SURGERY

## 2021-01-21 NOTE — PROGRESS NOTES
Assessment/Plan:      Approximately 2 and half months status post creation of left wrist AV fistula  Recent duplex evaluation did show some bifurcation of the cephalic vein in the upper forearm  The lower half of the AV fistula appears satisfactory for accessing  Will be okay to use the fistula in around February 10th  Plan:  Continue to monitor her left wrist AV fistula okay to use in around February 10th  NOTE:  If the dialysis  access nurses and all feel that the fistula needs further maturation then he would need a fistulogram and likely coil embolization of branches of the cephalic vein  ESRD (end stage renal disease) (HCC)        Subjective:      Patient ID: Cecil Monet is a 38 y o  male  Patient is s/p Lt radiocephalic AVF creation on 75/1/56 by Dr Nash Siemens  Pt had a HD duplex on 1/19/21  Pt continues to have numbness in the Lt hand numbness and tingling since his sx  Pt denies Lt hand pain or skin color changes  Pt denies issues with Lt hand mobility  There is a thrill and bruit  [email or web address]  Where they use Rt chest [facility]  HPI    The following portions of the patient's history were reviewed and updated as appropriate: allergies, current medications, past family history, past medical history, past social history, past surgical history and problem list     Review of Systems   Constitutional: Negative  HENT: Negative  Eyes: Negative  Respiratory: Negative  Cardiovascular: Negative  Gastrointestinal: Negative  Endocrine: Negative  Genitourinary: Positive for decreased urine volume  Musculoskeletal: Positive for gait problem (uses a cane)  Skin: Negative  Allergic/Immunologic: Negative  Neurological: Positive for numbness (Lt hand)  Hematological: Negative  Psychiatric/Behavioral: Negative  Objective: There were no vitals taken for this visit           Physical Exam  excellent bruit and thrill left wrist AV fistula more prominent in the lower half of his forearm likely bifurcation of the cephalic vein  I have reviewed and made appropriate changes to the review of systems input by the medical assistant  Vitals:    01/21/21 1340   BP: 142/84   BP Location: Right arm   Patient Position: Sitting   Pulse: 72   Resp: 18   Weight: 96 6 kg (213 lb)   Height: 5' 11" (1 803 m)       Patient Active Problem List   Diagnosis    Type 1 diabetes mellitus (HCC)    History of lacunar cerebrovascular accident (CVA)    PONV (postoperative nausea and vomiting)    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,      Binocular visual disturbance    Hyperphosphatemia    Vitamin D deficiency    Homozygous MTHFR mutation [id number] (HCC)    Anemia in chronic kidney disease, on chronic dialysis (HCC)    Persistent proteinuria    Ataxia    Esophagitis    Left atrial dilation    Renovascular hypertension    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)    Dyslipidemia    Strabismus    Diarrhea    Current moderate episode of major depressive disorder without prior episode (HCC)    Environmental and seasonal allergies    Pruritus    Obesity (BMI 30 0-34  9)    Gastroparesis diabeticorum (HCC)    Peripheral polyneuropathy    Incidental lung nodule, > 3mm and < 8mm    Vertigo    Impaired mobility and ADLs    Diabetic neuropathy (HCC)    Provoked seizure (HCC)    Asterixis    Foot drop, bilateral    ESRD (end stage renal disease) ([address] )    Recurrent peritonitis ([address] ) due to peritoneal dialysis catheter    Gastroparesis    Spontaneous bacterial peritonitis (HCC)    Chronic diarrhea    Secondary hyperparathyroidism ([address] )    Emesis    Orthostatic hypotension    Eosinophilic leukocytosis    Protein-calorie malnutrition (HCC)    End stage renal disease on dialysis due to type 1 diabetes mellitus (HCC)    Tubulovillous adenoma of colon    Gastroesophageal reflux disease without esophagitis       Past Surgical History:   Procedure Laterality Date    CARDIAC LOOP RECORDER  05/2018    COLONOSCOPY      EGD      EYE SURGERY Right     IR TUNNELED DIALYSIS CATHETER PLACEMENT  11/18/2020    PERITONEAL CATHETER INSERTION N/A 8/27/2018    Procedure: UNROOF PD CATHETER;  Surgeon: Munir Jessica DO;  [facility];  Service: General    WI ANASTOMOSIS,AV,ANY SITE Left 11/9/2020    Procedure: CREATION FISTULA  ARTERIOVENOUS (AV) - LEFT WRIST;  Surgeon: Mandi Figueroa MD;  [facility];  Service: Vascular    WI ESOPHAGOGASTRODUODENOSCOPY TRANSORAL DIAGNOSTIC N/A 4/18/2019    Procedure: ESOPHAGOGASTRODUODENOSCOPY (EGD); Surgeon: Alexandria Edwards MD;  [facility]: [facility];   Service: Gastroenterology    WI LAP INSERTION TUNNELED INTRAPERITONEAL CATHETER N/A 8/6/2018    Procedure: LAPAROSCOPIC PD CATHETER PLACEMENT;  Surgeon: Munir Jessica DO;  [facility];  Service: General    WI REMOVAL TUNNELED INTRAPERITONEAL CATHETER N/A 11/18/2020    Procedure: REMOVAL CATHETER PERITONEAL DIALYSIS;  Surgeon: Annette Mcbride MD;  [facility];  Service: General    TONSILLECTOMY      UPPER GASTROINTESTINAL ENDOSCOPY         Family History   Problem Relation Age of Onset    Breast cancer Mother     Hypertension Mother     Hyperlipidemia Father     Hypertension Father     Leukemia Maternal Grandmother     Hyperlipidemia Maternal Grandfather     Hypertension Maternal Grandfather     Hyperlipidemia Paternal Grandmother     Hypertension Paternal Grandmother     Heart disease Paternal Grandfather         cardiac disorder    Diabetes Paternal Grandfather        Social History     Socioeconomic History    Marital status: Single     Spouse name: Not on file    Number of children: Not on file    Years of education: Not on file    Highest education level: Not on file   Occupational History    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   Social Needs    Financial resource strain: Not on file  Food insecurity     Worry: Not on file     Inability: Not on file    Transportation needs     Medical: No     Non-medical: No   Tobacco Use    Smoking status: Former Smoker     Packs/day: 0 50     Years: 12 00     Pack years: 6 00     Types: Cigarettes     Quit date: 2018     Years since quittin 9    Smokeless tobacco: Never Used    Tobacco comment: quit 2018   Substance and Sexual Activity    Alcohol use: Not Currently    Drug use: Yes     Types: Marijuana     Comment: medical marijuana    Sexual activity: Not on file   Lifestyle    Physical activity     Days per week: Not on file     Minutes per session: Not on file    Stress: Not on file   Relationships    Social connections     Talks on phone: Not on file     Gets together: Not on file     Attends Judaism service: Not on file     Active member of club or organization: Not on file     Attends meetings of clubs or organizations: Not on file     Relationship status: Not on file    Intimate partner violence     Fear of current or ex partner: Not on file     Emotionally abused: Not on file     Physically abused: Not on file     Forced sexual activity: Not on file   Other Topics Concern    Not on file   Social History Narrative    Caffeine use    single       Allergies   Allergen Reactions    Sulfa Antibiotics Rash         Current Outpatient Medications:     aspirin 81 mg chewable tablet, Chew 81 mg daily, Disp: , Rfl:     atorvastatin (LIPITOR) 40 mg tablet, Take 1 tablet (40 mg total) by mouth every evening, Disp: 90 tablet, Rfl: 1    b complex vitamins capsule, Take 1 capsule by mouth daily before lunch , Disp: , Rfl:     bisacodyl (DULCOLAX) 5 mg EC tablet, Take 2 tablets (10 mg total) by mouth once for 1 dose, Disp: 2 tablet, Rfl: 0    bismuth subsalicylate (PEPTO BISMOL) 524 mg/30 mL oral suspension, Take 15 mL (262 mg total) by mouth 2 (two) times a day as needed for diarrhea, Disp: 177 mL, Rfl: 0    cholecalciferol (VITAMIN D3) 1,000 units tablet, Take 1,000 Units by mouth daily, Disp: , Rfl:     cinacalcet (SENSIPAR) 60 MG tablet, Take 1 tablet (60 mg total) by mouth every other day, Disp: 30 tablet, Rfl: 0    cloNIDine (CATAPRES-TTS-3) 0 3 mg/24 hr, clonidine 0 3 mg/24 hr weekly transdermal patch  APPLY 1 PATCH TOPICALLY ONCE A WEEK, Disp: , Rfl:     dicyclomine (BENTYL) 20 mg tablet, Take 1 tablet (20 mg total) by mouth 3 (three) times a day as needed (abdominal cramps), Disp: 20 tablet, Rfl: 0    doxazosin (CARDURA) 2 mg tablet, Take 1 tablet (2 mg total) by mouth daily at bedtime, Disp: 30 tablet, Rfl: 0    escitalopram (LEXAPRO) 10 mg tablet, Take 1 tablet (10 mg total) by mouth daily, Disp: 90 tablet, Rfl: 2    famotidine (PEPCID) 40 MG tablet, Take 1 tablet (40 mg total) by mouth 2 (two) times a day, Disp: 60 tablet, Rfl: 5    GLUCAGON EMERGENCY 1 MG injection, INJECT 1MG SUBCUTANEOUSLY AS NEEDED (AS DIRECTED)   MAY REPEAT DOSE EVERY 20 MINUTES AS NEEDED, Disp: , Rfl: 3    hydrALAZINE (APRESOLINE) 100 MG tablet, Take 0 5 tablets (50 mg total) by mouth 2 (two) times a day (Patient taking differently: Take 50 mg by mouth 50mg in the am ad 75mg at night), Disp: 90 tablet, Rfl: 1    insulin glargine (Basaglar KwikPen) 100 units/mL injection pen, Inject 14 Units under the skin daily at bedtime (Patient taking differently: Inject 15 Units under the skin daily at bedtime ), Disp: , Rfl:     insulin isophane (NovoLIN N FlexPen) 100 units/mL injection pen, Novolin N Flexpen 100 unit/mL (3 mL) subcutaneous insulin pen  INJECT 4 UNITS SUBCUTANEOUSLY NIGHTLY, Disp: , Rfl:     labetalol (NORMODYNE) 300 mg tablet, Take 1 tablet (300 mg total) by mouth every 12 (twelve) hours (Patient taking differently: Take 300 mg by mouth daily ), Disp: 60 tablet, Rfl: 0    lisinopril (ZESTRIL) 20 mg tablet, Take 1 tablet (20 mg total) by mouth daily, Disp: 30 tablet, Rfl: 0    loperamide (IMODIUM) 2 mg capsule, Take 1 capsule (2 mg total) by mouth 3 (three) times a day as needed for diarrhea, Disp: 30 capsule, Rfl: 0    NIFEdipine ER (ADALAT CC) 60 MG 24 hr tablet, Take 1 tablet (60 mg total) by mouth 2 (two) times a day, Disp: 180 tablet, Rfl: 0    ondansetron (ZOFRAN) 4 mg tablet, Take 4 mg by mouth every 8 (eight) hours as needed for nausea or vomiting, Disp: , Rfl:     saccharomyces boulardii (FLORASTOR) 250 mg capsule, Take 1 capsule (250 mg total) by mouth 2 (two) times a day, Disp: 60 capsule, Rfl: 0    sevelamer (RENAGEL) 800 mg tablet, Take 3 tablets (2,400 mg total) by mouth 3 (three) times a day with meals, Disp: 270 tablet, Rfl: 0    sevelamer carbonate (Renvela) 800 mg tablet, Renvela 800 mg tablet  TAKE 3 TABLETS BY MOUTH THREE TIMES DAILY WITH MEALS, Disp: , Rfl:     torsemide (DEMADEX) 100 mg tablet, Take 100 mg by mouth daily, Disp: , Rfl:     Velphoro 500 MG CHEW, CHEW AND SWALLOW 1 TABLET BY MOUTH WITH MEALS (UP TO THREE TIMES DAILY)  , Disp: , Rfl:     B-D ULTRAFINE III SHORT PEN 31G X 8 MM MISC, USE 1 PEN NEEDLE 8 TIMES DAILY, Disp: 100 each, Rfl: 47    calcium acetate (CALPHRON) 667 mg, TAKE 3 TABLETS BY MOUTH WITH MEALS, Disp: , Rfl:     cloNIDine (CATAPRES) 0 1 mg tablet, Take 1 tablet (0 1 mg total) by mouth every 12 (twelve) hours (Patient not taking: Reported on 1/18/2021), Disp: 60 tablet, Rfl: 0    gabapentin (NEURONTIN) 100 mg capsule, Take 1 capsule (100 mg total) by mouth daily at bedtime, Disp: 30 capsule, Rfl: 5    insulin lispro (HumaLOG) 100 units/mL injection, Inject 4 Units under the skin 3 (three) times a day with meals (Patient not taking: Reported on 1/21/2021), Disp: 10 mL, Rfl: 0    metolazone (ZAROXOLYN) 10 mg tablet, Take 10 mg by mouth daily, Disp: , Rfl:     polyethylene glycol (GOLYTELY) 4000 mL solution, Take 4,000 mL by mouth once for 1 dose, Disp: 4000 mL, Rfl: 0

## 2021-01-21 NOTE — LETTER
January 21, 2021     Juan Bourgeois DO  [address]  [address], [place] [id number]    Patient: Karmen Salas   YOB: 1985   Date of Visit: 1/21/2021       Dear Dr Daron Ferrer: Thank you for referring Guzman Heck to me for evaluation  Below are my notes for this consultation  If you have questions, please do not hesitate to call me  I look forward to following your patient along with you           Sincerely,        Maddie Choudhary MD        CC: No Recipients

## 2021-01-21 NOTE — ASSESSMENT & PLAN NOTE
Lab Results   Component Value Date    EGFR 12 01/03/2021    EGFR 10 12/26/2020    EGFR 9 12/16/2020    CREATININE 5 65 (H) 01/03/2021    CREATININE 6 67 (H) 12/26/2020    CREATININE 7 26 (H) 12/16/2020   Approximately 2 and half months status post creation of left wrist AV fistula  Recent duplex evaluation did show some bifurcation of the cephalic vein in the upper forearm  The lower half of the AV fistula appears satisfactory for accessing  Will be okay to use the fistula in around February 10th  Plan:  Continue to monitor her left wrist AV fistula okay to use in around February 10th

## 2021-01-21 NOTE — PATIENT INSTRUCTIONS
Approximately 2 and half months status post creation of left wrist AV fistula  Recent duplex evaluation did show some bifurcation of the cephalic vein in the upper forearm  The lower half of the AV fistula appears satisfactory for accessing  Will be okay to use the fistula in around February 10th  Plan:  Continue to monitor her left wrist AV fistula okay to use in around February 10th

## 2021-01-21 NOTE — H&P (VIEW-ONLY)
Assessment/Plan:      Approximately 2 and half months status post creation of left wrist AV fistula  Recent duplex evaluation did show some bifurcation of the cephalic vein in the upper forearm  The lower half of the AV fistula appears satisfactory for accessing  Will be okay to use the fistula in around February 10th  Plan:  Continue to monitor her left wrist AV fistula okay to use in around February 10th  NOTE:  If the dialysis  access nurses and all feel that the fistula needs further maturation then he would need a fistulogram and likely coil embolization of branches of the cephalic vein  ESRD (end stage renal disease) (HCC)        Subjective:      Patient ID: Rosa Maria Nix is a 38 y o  male  Patient is s/p Lt radiocephalic AVF creation on 79/1/96 by Dr Severa Barefoot  Pt had a HD duplex on 1/19/21  Pt continues to have numbness in the Lt hand numbness and tingling since his sx  Pt denies Lt hand pain or skin color changes  Pt denies issues with Lt hand mobility  There is a thrill and bruit  Pt goes to [facility]  Where they use Rt chest PermCath  HPI    The following portions of the patient's history were reviewed and updated as appropriate: allergies, current medications, past family history, past medical history, past social history, past surgical history and problem list     Review of Systems   Constitutional: Negative  HENT: Negative  Eyes: Negative  Respiratory: Negative  Cardiovascular: Negative  Gastrointestinal: Negative  Endocrine: Negative  Genitourinary: Positive for decreased urine volume  Musculoskeletal: Positive for gait problem (uses a cane)  Skin: Negative  Allergic/Immunologic: Negative  Neurological: Positive for numbness (Lt hand)  Hematological: Negative  Psychiatric/Behavioral: Negative  Objective: There were no vitals taken for this visit           Physical Exam  excellent bruit and thrill left wrist AV fistula more prominent in the lower half of his forearm likely bifurcation of the cephalic vein  I have reviewed and made appropriate changes to the review of systems input by the medical assistant  Vitals:    01/21/21 1340   BP: 142/84   BP Location: Right arm   Patient Position: Sitting   Pulse: 72   Resp: 18   Weight: 96 6 kg (213 lb)   Height: 5' 11" (1 803 m)       Patient Active Problem List   Diagnosis    Type 1 diabetes mellitus (HCC)    History of lacunar cerebrovascular accident (CVA)    PONV (postoperative nausea and vomiting)    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,      Binocular visual disturbance    Hyperphosphatemia    Vitamin D deficiency    Homozygous MTHFR mutation [id number] (HCC)    Anemia in chronic kidney disease, on chronic dialysis (HCC)    Persistent proteinuria    Ataxia    Esophagitis    Left atrial dilation    Renovascular hypertension    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)    Dyslipidemia    Strabismus    Diarrhea    Current moderate episode of major depressive disorder without prior episode (HCC)    Environmental and seasonal allergies    Pruritus    Obesity (BMI 30 0-34  9)    Gastroparesis diabeticorum (HCC)    Peripheral polyneuropathy    Incidental lung nodule, > 3mm and < 8mm    Vertigo    Impaired mobility and ADLs    Diabetic neuropathy (HCC)    Provoked seizure (HCC)    Asterixis    Foot drop, bilateral    ESRD (end stage renal disease) ([address] )    Recurrent peritonitis ([address] ) due to peritoneal dialysis catheter    Gastroparesis    Spontaneous bacterial peritonitis (HCC)    Chronic diarrhea    Secondary hyperparathyroidism ([address] )    Emesis    Orthostatic hypotension    Eosinophilic leukocytosis    Protein-calorie malnutrition (HCC)    End stage renal disease on dialysis due to type 1 diabetes mellitus (HCC)    Tubulovillous adenoma of colon    Gastroesophageal reflux disease without esophagitis       Past Surgical History:   Procedure Laterality Date    CARDIAC LOOP RECORDER  05/2018    COLONOSCOPY      EGD      EYE SURGERY Right     IR TUNNELED DIALYSIS CATHETER PLACEMENT  11/18/2020    PERITONEAL CATHETER INSERTION N/A 8/27/2018    Procedure: UNROOF PD CATHETER;  Surgeon: Pepe Amin DO;  [facility];  Service: General    MO ANASTOMOSIS,AV,ANY SITE Left 11/9/2020    Procedure: CREATION FISTULA  ARTERIOVENOUS (AV) - LEFT WRIST;  Surgeon: Liban Sullivan MD;  [facility];  Service: Vascular    MO ESOPHAGOGASTRODUODENOSCOPY TRANSORAL DIAGNOSTIC N/A 4/18/2019    Procedure: ESOPHAGOGASTRODUODENOSCOPY (EGD); Surgeon: Mark Castillo MD;  [facility]: [facility];   Service: Gastroenterology    MO LAP INSERTION TUNNELED INTRAPERITONEAL CATHETER N/A 8/6/2018    Procedure: LAPAROSCOPIC PD CATHETER PLACEMENT;  Surgeon: Pepe Amin DO;  [facility];  Service: General    MO REMOVAL TUNNELED INTRAPERITONEAL CATHETER N/A 11/18/2020    Procedure: REMOVAL CATHETER PERITONEAL DIALYSIS;  Surgeon: Yolanda Finch MD;  [facility];  Service: General    TONSILLECTOMY      UPPER GASTROINTESTINAL ENDOSCOPY         Family History   Problem Relation Age of Onset    Breast cancer Mother     Hypertension Mother     Hyperlipidemia Father     Hypertension Father     Leukemia Maternal Grandmother     Hyperlipidemia Maternal Grandfather     Hypertension Maternal Grandfather     Hyperlipidemia Paternal Grandmother     Hypertension Paternal Grandmother     Heart disease Paternal Grandfather         cardiac disorder    Diabetes Paternal Grandfather        Social History     Socioeconomic History    Marital status: Single     Spouse name: Not on file    Number of children: Not on file    Years of education: Not on file    Highest education level: Not on file   Occupational History    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   Social Needs    Financial resource strain: Not on file  Food insecurity     Worry: Not on file     Inability: Not on file    Transportation needs     Medical: No     Non-medical: No   Tobacco Use    Smoking status: Former Smoker     Packs/day: 0 50     Years: 12 00     Pack years: 6 00     Types: Cigarettes     Quit date: 2018     Years since quittin 9    Smokeless tobacco: Never Used    Tobacco comment: quit 2018   Substance and Sexual Activity    Alcohol use: Not Currently    Drug use: Yes     Types: Marijuana     Comment: medical marijuana    Sexual activity: Not on file   Lifestyle    Physical activity     Days per week: Not on file     Minutes per session: Not on file    Stress: Not on file   Relationships    Social connections     Talks on phone: Not on file     Gets together: Not on file     Attends Sabianism service: Not on file     Active member of club or organization: Not on file     Attends meetings of clubs or organizations: Not on file     Relationship status: Not on file    Intimate partner violence     Fear of current or ex partner: Not on file     Emotionally abused: Not on file     Physically abused: Not on file     Forced sexual activity: Not on file   Other Topics Concern    Not on file   Social History Narrative    Caffeine use    single       Allergies   Allergen Reactions    Sulfa Antibiotics Rash         Current Outpatient Medications:     aspirin 81 mg chewable tablet, Chew 81 mg daily, Disp: , Rfl:     atorvastatin (LIPITOR) 40 mg tablet, Take 1 tablet (40 mg total) by mouth every evening, Disp: 90 tablet, Rfl: 1    b complex vitamins capsule, Take 1 capsule by mouth daily before lunch , Disp: , Rfl:     bisacodyl (DULCOLAX) 5 mg EC tablet, Take 2 tablets (10 mg total) by mouth once for 1 dose, Disp: 2 tablet, Rfl: 0    bismuth subsalicylate (PEPTO BISMOL) 524 mg/30 mL oral suspension, Take 15 mL (262 mg total) by mouth 2 (two) times a day as needed for diarrhea, Disp: 177 mL, Rfl: 0    cholecalciferol (VITAMIN D3) 1,000 units tablet, Take 1,000 Units by mouth daily, Disp: , Rfl:     cinacalcet (SENSIPAR) 60 MG tablet, Take 1 tablet (60 mg total) by mouth every other day, Disp: 30 tablet, Rfl: 0    cloNIDine (CATAPRES-TTS-3) 0 3 mg/24 hr, clonidine 0 3 mg/24 hr weekly transdermal patch  APPLY 1 PATCH TOPICALLY ONCE A WEEK, Disp: , Rfl:     dicyclomine (BENTYL) 20 mg tablet, Take 1 tablet (20 mg total) by mouth 3 (three) times a day as needed (abdominal cramps), Disp: 20 tablet, Rfl: 0    doxazosin (CARDURA) 2 mg tablet, Take 1 tablet (2 mg total) by mouth daily at bedtime, Disp: 30 tablet, Rfl: 0    escitalopram (LEXAPRO) 10 mg tablet, Take 1 tablet (10 mg total) by mouth daily, Disp: 90 tablet, Rfl: 2    famotidine (PEPCID) 40 MG tablet, Take 1 tablet (40 mg total) by mouth 2 (two) times a day, Disp: 60 tablet, Rfl: 5    GLUCAGON EMERGENCY 1 MG injection, INJECT 1MG SUBCUTANEOUSLY AS NEEDED (AS DIRECTED)   MAY REPEAT DOSE EVERY 20 MINUTES AS NEEDED, Disp: , Rfl: 3    hydrALAZINE (APRESOLINE) 100 MG tablet, Take 0 5 tablets (50 mg total) by mouth 2 (two) times a day (Patient taking differently: Take 50 mg by mouth 50mg in the am ad 75mg at night), Disp: 90 tablet, Rfl: 1    insulin glargine (Basaglar KwikPen) 100 units/mL injection pen, Inject 14 Units under the skin daily at bedtime (Patient taking differently: Inject 15 Units under the skin daily at bedtime ), Disp: , Rfl:     insulin isophane (NovoLIN N FlexPen) 100 units/mL injection pen, Novolin N Flexpen 100 unit/mL (3 mL) subcutaneous insulin pen  INJECT 4 UNITS SUBCUTANEOUSLY NIGHTLY, Disp: , Rfl:     labetalol (NORMODYNE) 300 mg tablet, Take 1 tablet (300 mg total) by mouth every 12 (twelve) hours (Patient taking differently: Take 300 mg by mouth daily ), Disp: 60 tablet, Rfl: 0    lisinopril (ZESTRIL) 20 mg tablet, Take 1 tablet (20 mg total) by mouth daily, Disp: 30 tablet, Rfl: 0    loperamide (IMODIUM) 2 mg capsule, Take 1 capsule (2 mg total) by mouth 3 (three) times a day as needed for diarrhea, Disp: 30 capsule, Rfl: 0    NIFEdipine ER (ADALAT CC) 60 MG 24 hr tablet, Take 1 tablet (60 mg total) by mouth 2 (two) times a day, Disp: 180 tablet, Rfl: 0    ondansetron (ZOFRAN) 4 mg tablet, Take 4 mg by mouth every 8 (eight) hours as needed for nausea or vomiting, Disp: , Rfl:     saccharomyces boulardii (FLORASTOR) 250 mg capsule, Take 1 capsule (250 mg total) by mouth 2 (two) times a day, Disp: 60 capsule, Rfl: 0    sevelamer (RENAGEL) 800 mg tablet, Take 3 tablets (2,400 mg total) by mouth 3 (three) times a day with meals, Disp: 270 tablet, Rfl: 0    sevelamer carbonate (Renvela) 800 mg tablet, Renvela 800 mg tablet  TAKE 3 TABLETS BY MOUTH THREE TIMES DAILY WITH MEALS, Disp: , Rfl:     torsemide (DEMADEX) 100 mg tablet, Take 100 mg by mouth daily, Disp: , Rfl:     Velphoro 500 MG CHEW, CHEW AND SWALLOW 1 TABLET BY MOUTH WITH MEALS (UP TO THREE TIMES DAILY)  , Disp: , Rfl:     B-D ULTRAFINE III SHORT PEN 31G X 8 MM MISC, USE 1 PEN NEEDLE 8 TIMES DAILY, Disp: 100 each, Rfl: 47    calcium acetate (CALPHRON) 667 mg, TAKE 3 TABLETS BY MOUTH WITH MEALS, Disp: , Rfl:     cloNIDine (CATAPRES) 0 1 mg tablet, Take 1 tablet (0 1 mg total) by mouth every 12 (twelve) hours (Patient not taking: Reported on 1/18/2021), Disp: 60 tablet, Rfl: 0    gabapentin (NEURONTIN) 100 mg capsule, Take 1 capsule (100 mg total) by mouth daily at bedtime, Disp: 30 capsule, Rfl: 5    insulin lispro (HumaLOG) 100 units/mL injection, Inject 4 Units under the skin 3 (three) times a day with meals (Patient not taking: Reported on 1/21/2021), Disp: 10 mL, Rfl: 0    metolazone (ZAROXOLYN) 10 mg tablet, Take 10 mg by mouth daily, Disp: , Rfl:     polyethylene glycol (GOLYTELY) 4000 mL solution, Take 4,000 mL by mouth once for 1 dose, Disp: 4000 mL, Rfl: 0

## 2021-01-22 DIAGNOSIS — E10.42 DIABETIC POLYNEUROPATHY ASSOCIATED WITH TYPE 1 DIABETES MELLITUS (HCC): ICD-10-CM

## 2021-01-22 RX ORDER — GABAPENTIN 100 MG/1
CAPSULE ORAL
Qty: 60 CAPSULE | Refills: 5 | Status: SHIPPED | OUTPATIENT
Start: 2021-01-22 | End: 2021-07-26 | Stop reason: SDUPTHER

## 2021-01-22 NOTE — TELEPHONE ENCOUNTER
Patient's father states last time you discussed changing gabapentin to 200mg at bedtime but it was never changed so he is running out due to doubling up  Updated script to reflect the new dosage

## 2021-01-28 ENCOUNTER — TRANSCRIBE ORDERS (OUTPATIENT)
Dept: RADIOLOGY | Facility: HOSPITAL | Age: 36
End: 2021-01-28

## 2021-01-28 DIAGNOSIS — N18.6 ESRD (END STAGE RENAL DISEASE) (HCC): Primary | ICD-10-CM

## 2021-02-03 ENCOUNTER — TELEPHONE (OUTPATIENT)
Dept: NEPHROLOGY | Facility: CLINIC | Age: 36
End: 2021-02-03

## 2021-02-03 NOTE — TELEPHONE ENCOUNTER
Discussed concerns about dry weight with patient at the dialysis clinic over the phone  He feels his dry weight should be raised to 98kg and currently he is at 95kg  He has been meeting this target weight post treatment however states that he is not feeling great after treatments  We will adjust his profile on the machine as well as not challenge him further but I do feel this target weight is appropriate as he has no more LE edema and his blood pressure has improved  Patient is frustrated but understands what I am saying

## 2021-02-12 ENCOUNTER — HOSPITAL ENCOUNTER (OUTPATIENT)
Dept: RADIOLOGY | Facility: HOSPITAL | Age: 36
Discharge: HOME | End: 2021-02-12
Attending: RADIOLOGY | Admitting: RADIOLOGY
Payer: MEDICARE

## 2021-02-12 DIAGNOSIS — N18.6 ESRD (END STAGE RENAL DISEASE) (HCC): ICD-10-CM

## 2021-02-12 PROCEDURE — 36589 REMOVAL TUNNELED CV CATH: CPT | Performed by: RADIOLOGY

## 2021-02-12 PROCEDURE — 36589 REMOVAL TUNNELED CV CATH: CPT

## 2021-02-12 NOTE — DISCHARGE INSTRUCTIONS
Perma-cath Removal   WHAT YOU NEED TO KNOW:   A perma-cath is a catheter placed through a vein into or near your right atrium  Your right atrium is the right upper chamber of your heart  A perma-cath is used for dialysis in an emergency or until a long-term device is ready to use  Or you no longer need dialysis  DISCHARGE INSTRUCTIONS:   Call 911 for any of the following:   · You feel lightheaded, short of breath, and have chest pain  · You start bleeding    Contact Interventional Radiology at [phone] Gus PATIENTS: Contact Interventional Radiology at [phone]) David Olivarez PATIENTS: Contact Interventional Radiology at [phone]) if:  · Blood soaks through your bandage  · You have new swelling in your arm, neck, face, or chest on your right side  · Your bruises or pain get worse  · You have a fever or chills  · Persistent nausea or vomiting  · Your incision is red, swollen, or draining pus  · You have questions or concerns about your condition or care  Self-care:   · Resume your normal diet  Small sips of flat soda will help with nausea  · Keep your dressings dry  Do not get your perma-cath site wet until the incision closes  You may take a tub bath, but do not get your dressings wet  Water in your wound can cause bacteria to grow and cause an infection  If your dressing gets wet, remove the wet dressing and apply a dry bandaid  Keep it covered until the incision closes  This should only take a few days to heal  Do not use soaps or ointments  · Immediately after your catheter is removed, no strenuous activity for twenty four hours and stay in an upright sitting position for two hours     Follow up with your healthcare provider as directed: Write down your questions so you remember to ask them during your visits

## 2021-02-15 ENCOUNTER — SOCIAL WORK (OUTPATIENT)
Dept: BEHAVIORAL/MENTAL HEALTH CLINIC | Facility: CLINIC | Age: 36
End: 2021-02-15
Payer: MEDICARE

## 2021-02-15 ENCOUNTER — PREP FOR PROCEDURE (OUTPATIENT)
Dept: INTERVENTIONAL RADIOLOGY/VASCULAR | Facility: CLINIC | Age: 36
End: 2021-02-15

## 2021-02-15 ENCOUNTER — TRANSCRIBE ORDERS (OUTPATIENT)
Dept: RADIOLOGY | Facility: HOSPITAL | Age: 36
End: 2021-02-15

## 2021-02-15 ENCOUNTER — TELEPHONE (OUTPATIENT)
Dept: INTERVENTIONAL RADIOLOGY/VASCULAR | Facility: HOSPITAL | Age: 36
End: 2021-02-15

## 2021-02-15 DIAGNOSIS — G47.00 INSOMNIA, UNSPECIFIED TYPE: ICD-10-CM

## 2021-02-15 DIAGNOSIS — F41.1 GAD (GENERALIZED ANXIETY DISORDER): ICD-10-CM

## 2021-02-15 DIAGNOSIS — F33.2 SEVERE EPISODE OF RECURRENT MAJOR DEPRESSIVE DISORDER, WITHOUT PSYCHOTIC FEATURES (HCC): Primary | ICD-10-CM

## 2021-02-15 DIAGNOSIS — N18.6 ESRD (END STAGE RENAL DISEASE) (HCC): Primary | ICD-10-CM

## 2021-02-15 PROCEDURE — 90834 PSYTX W PT 45 MINUTES: CPT | Performed by: SOCIAL WORKER

## 2021-02-15 RX ORDER — CEFAZOLIN SODIUM 2 G/50ML
2000 SOLUTION INTRAVENOUS ONCE
Status: CANCELLED | OUTPATIENT
Start: 2021-02-15 | End: 2021-02-15

## 2021-02-15 RX ORDER — SODIUM CHLORIDE 9 MG/ML
50 INJECTION, SOLUTION INTRAVENOUS CONTINUOUS
Status: CANCELLED | OUTPATIENT
Start: 2021-02-16

## 2021-02-15 NOTE — PROGRESS NOTES
Pt called and message left on machine for procedure on 2/16  Arrival time 12:30 for 13:45 procedure time  No insulin day of procedure  Clear liquids in AM if needed  Call back number and directions left for patient

## 2021-02-15 NOTE — PSYCH
Psychotherapy Provided: Individual Psychotherapy 45 minutes     Length of time in session: 45 minutes, follow up in 2 week    Goals addressed in session: Goal 1 and Goal 2     Pain:      moderate to severe    3    Current suicide risk : Low     Data: Paulo attended his session  He is on dialysis and shared that he was taken off the transplant list due to his suicidal thoughts in the past and his psychiatric hospitalization  The undersigned did arrange for Paulo to be seen by Dr Nuria Diop  Hopefully he can be cleared to eventually get back on the list  This gentleman was naturally depressed over his health issues and now he feels penalized over being honest about his feelings at the time  He is most upset that the transplant center at [facility] never met with him in person to discuss this and simply dropped him over the phone  He is going to look into the [facility] program since he now has Medicare  Regarding his depression and his anxiety he feels he is handling it  He wishes he simply had more energy but dialysis causes him fatigue  He enjoys seeing his niece and nephew  He claims his holidays were fine  Regarding his family of origin issues he said things at home are ok  However he in his words bites his tongue with his parents especially with his mom  He shared therapy is helping him to just roll with certain things  He shared his mother will out of nowhere will just start things with Paulo  He shared she needs constant recognition for what she does  She will in his words switch up on him where one moment she is Paulo's best friend to where he cant do anything right  We discussed strategies to help him deal with this  Assessment: Naty Steven denies suicidal/homicidal ideation, plan or intent  However he is very frustrated in that he was in the past honest about his feelings and emotions and he feels it was used against him without a personal meeting but rather simply just a phone call  Supportive counseling was provided  Plan: Continue to help Bill skill build in distress tolerance  Behavioral Health Treatment Plan [facility]: Diagnosis and Treatment Plan explained to Abdirahman Monet relates understanding diagnosis and is agreeable to Treatment Plan   Yes

## 2021-02-16 ENCOUNTER — TELEPHONE (OUTPATIENT)
Dept: SURGERY | Facility: HOSPITAL | Age: 36
End: 2021-02-16

## 2021-02-16 ENCOUNTER — HOSPITAL ENCOUNTER (OUTPATIENT)
Dept: INTERVENTIONAL RADIOLOGY/VASCULAR | Facility: HOSPITAL | Age: 36
Discharge: HOME | End: 2021-02-16
Attending: STUDENT IN AN ORGANIZED HEALTH CARE EDUCATION/TRAINING PROGRAM | Admitting: STUDENT IN AN ORGANIZED HEALTH CARE EDUCATION/TRAINING PROGRAM
Payer: MEDICARE

## 2021-02-16 VITALS
HEIGHT: 71 IN | DIASTOLIC BLOOD PRESSURE: 74 MMHG | OXYGEN SATURATION: 97 % | WEIGHT: 213 LBS | SYSTOLIC BLOOD PRESSURE: 162 MMHG | TEMPERATURE: 97.7 F | BODY MASS INDEX: 29.82 KG/M2 | HEART RATE: 75 BPM | RESPIRATION RATE: 16 BRPM

## 2021-02-16 DIAGNOSIS — N18.6 ESRD (END STAGE RENAL DISEASE) (HCC): ICD-10-CM

## 2021-02-16 LAB — GLUCOSE SERPL-MCNC: 257 MG/DL (ref 65–140)

## 2021-02-16 PROCEDURE — 36558 INSERT TUNNELED CV CATH: CPT

## 2021-02-16 PROCEDURE — 99153 MOD SED SAME PHYS/QHP EA: CPT

## 2021-02-16 PROCEDURE — 99152 MOD SED SAME PHYS/QHP 5/>YRS: CPT

## 2021-02-16 PROCEDURE — 77001 FLUOROGUIDE FOR VEIN DEVICE: CPT

## 2021-02-16 PROCEDURE — 77001 FLUOROGUIDE FOR VEIN DEVICE: CPT | Performed by: RADIOLOGY

## 2021-02-16 PROCEDURE — 82948 REAGENT STRIP/BLOOD GLUCOSE: CPT

## 2021-02-16 PROCEDURE — 99152 MOD SED SAME PHYS/QHP 5/>YRS: CPT | Performed by: RADIOLOGY

## 2021-02-16 PROCEDURE — C1750 CATH, HEMODIALYSIS,LONG-TERM: HCPCS

## 2021-02-16 PROCEDURE — 76937 US GUIDE VASCULAR ACCESS: CPT | Performed by: RADIOLOGY

## 2021-02-16 PROCEDURE — 36558 INSERT TUNNELED CV CATH: CPT | Performed by: RADIOLOGY

## 2021-02-16 PROCEDURE — C1769 GUIDE WIRE: HCPCS

## 2021-02-16 RX ORDER — SODIUM CHLORIDE 9 MG/ML
50 INJECTION, SOLUTION INTRAVENOUS CONTINUOUS
Status: DISCONTINUED | OUTPATIENT
Start: 2021-02-16 | End: 2021-02-20 | Stop reason: HOSPADM

## 2021-02-16 RX ORDER — CEFAZOLIN SODIUM 2 G/50ML
2000 SOLUTION INTRAVENOUS ONCE
Status: COMPLETED | OUTPATIENT
Start: 2021-02-16 | End: 2021-02-16

## 2021-02-16 RX ORDER — MINOXIDIL 2.5 MG/1
2.5 TABLET ORAL 2 TIMES DAILY
COMMUNITY
Start: 2021-02-09

## 2021-02-16 RX ORDER — MIDAZOLAM HYDROCHLORIDE 2 MG/2ML
INJECTION, SOLUTION INTRAMUSCULAR; INTRAVENOUS CODE/TRAUMA/SEDATION MEDICATION
Status: COMPLETED | OUTPATIENT
Start: 2021-02-16 | End: 2021-02-16

## 2021-02-16 RX ORDER — DOXAZOSIN 8 MG/1
8 TABLET ORAL
COMMUNITY
End: 2022-07-16

## 2021-02-16 RX ORDER — FENTANYL CITRATE 50 UG/ML
INJECTION, SOLUTION INTRAMUSCULAR; INTRAVENOUS CODE/TRAUMA/SEDATION MEDICATION
Status: COMPLETED | OUTPATIENT
Start: 2021-02-16 | End: 2021-02-16

## 2021-02-16 RX ADMIN — CEFAZOLIN SODIUM 2000 MG: 2 SOLUTION INTRAVENOUS at 15:00

## 2021-02-16 RX ADMIN — FENTANYL CITRATE 50 MCG: 50 INJECTION INTRAMUSCULAR; INTRAVENOUS at 15:27

## 2021-02-16 RX ADMIN — MIDAZOLAM HYDROCHLORIDE 1 MG: 1 INJECTION, SOLUTION INTRAMUSCULAR; INTRAVENOUS at 15:27

## 2021-02-16 RX ADMIN — FENTANYL CITRATE 50 MCG: 50 INJECTION INTRAMUSCULAR; INTRAVENOUS at 15:32

## 2021-02-16 RX ADMIN — MIDAZOLAM HYDROCHLORIDE 1 MG: 1 INJECTION, SOLUTION INTRAMUSCULAR; INTRAVENOUS at 15:32

## 2021-02-16 RX ADMIN — Medication 10 ML: at 15:38

## 2021-02-16 NOTE — INTERVAL H&P NOTE
H&P updated  The patient was examined and fistula has not been performing well due to poor cannulation, low flows and possible  AA stenosis  Here for [facility]  /58   Pulse 74   Temp (!) 97 3 °F (36 3 °C) (Temporal)   Resp 18   Ht 5' 11" (1 803 m)   Wt 96 6 kg (213 lb)   SpO2 98%   BMI 29 71 kg/m²     Patient re-evaluated   Accept as history and physical     Pablito Mckoy, DO/February 16, 2021/3:12 PM

## 2021-02-16 NOTE — DISCHARGE INSTRUCTIONS
Perma-cath Placement   WHAT YOU NEED TO KNOW:   A perma-cath is a catheter placed through a vein into or near your right atrium  Your right atrium is the right upper chamber of your heart  A perma-cath is used for dialysis in an emergency or until a long-term device is ready to use  After your procedure, you will have some pain and swelling on your chest and neck  You may have some bruises on your chest and neck  You may also have 2 dressings, one on your chest and one on your neck  DISCHARGE INSTRUCTIONS:   Call 911 for any of the following:   · You feel lightheaded, short of breath, and have chest pain  · Your catheter comes out   Contact Interventional Radiology at [phone] Gus PATIENTS: Contact Interventional Radiology at [phone]) Tom Allen PATIENTS: Contact Interventional Radiology at [phone]) if:  · Blood soaks through your bandage  · You have new swelling in your arm, neck, face, or chest on your right side  · Your catheter gets wet  · Your bruises or pain get worse  · You have a fever or chills  · Persistent nausea or vomiting  · Your incision is red, swollen, or draining pus  · You have questions or concerns about your condition or care  Self-care:       · Resume your normal diet  · Keep your dressings dry  Do not take a shower or swim  You may take a tub bath, but do not get your dressings wet  Water in your wound can cause bacteria to grow and cause an infection  If your dressing gets wet, dry it off and cover it with dry sterile gauze  Call your healthcare provider  Do not use soaps or ointments  · Do not change your dressings  Your healthcare provider or dialysis nurse will change your dressings  Your dressings should stay in place until your healthcare provider removes them  The dressing on your chest will stay as long as you have the catheter in place  The dressing prevents infection  · Do not remove the red and blue caps from the end of your catheter   The caps prevent air from getting into your catheter  Follow up with your healthcare provider as directed: Write down your questions so you remember to ask them during your visits        Procedural Sedation   WHAT YOU NEED TO KNOW:   Procedural sedation is medicine used during procedures to help you feel relaxed and calm  You will remember little to none of the procedure  After sedation you may feel tired, weak, or unsteady on your feet  You may also have trouble concentrating or short-term memory loss  These symptoms should go away in 24 hours or less  DISCHARGE INSTRUCTIONS:     Call 911 or have someone else call for any of the following:   · You have sudden trouble breathing      · You cannot be woken  Contact Interventional Radiology at [phone]   Gus PATIENTS: Contact Interventional Radiology at [phone]) Brian Winslow PATIENTS: Contact Interventional Radiology at [phone]) if any of the following occur:     · You have a severe headache or dizziness      · Your heart is beating faster than usual     · You have a fever or chills      · Your skin is itchy, swollen, or you have a rash      · You have nausea or are vomiting for more than 8 hours after the procedure       · You have questions or concerns about your condition or care  Self-care:   · Have someone stay with you for 24 hours  This person can drive you to errands and help you do things around the house  This person can also watch for problems       · Rest and do quiet activities for 24 hours  Do not exercise, ride a bike, or play sports  Stand up slowly to prevent dizziness and falls  Take short walks around the house with another person  Slowly return to your usual activities the next day       · Do not drive or use dangerous machines or tools for 24 hours  You may injure yourself or others  Examples include a lawnmower, saw, or drill   Do not return to work for 24 hours if you use dangerous machines or tools for work       · Do not make important decisions for 24 hours  For example, do not sign important papers or invest money       · Drink liquids as directed  Liquids help flush the sedation medicine out of your body  Ask how much liquid to drink each day and which liquids are best for you       · Eat small, frequent meals to prevent nausea and vomiting  Start with clear liquids such as juice or broth  If you do not vomit after clear liquids, you can eat your usual foods       · Do not drink alcohol or take medicines that make you drowsy  This includes medicines that help you sleep and anxiety medicines  Ask your healthcare provider if it is safe for you to take pain medicine  Follow up with your healthcare provider as directed: Write down your questions so you remember to ask them during your visits  Procedural Sedation   WHAT YOU NEED TO KNOW:   Procedural sedation is medicine used during procedures to help you feel relaxed and calm  You will remember little to none of the procedure  After sedation you may feel tired, weak, or unsteady on your feet  You may also have trouble concentrating or short-term memory loss  These symptoms should go away in 24 hours or less  DISCHARGE INSTRUCTIONS:     Call 911 or have someone else call for any of the following:   · You have sudden trouble breathing      · You cannot be woken  Contact Interventional Radiology at [phone]   [place] PATIENTS: Contact Interventional Radiology at [phone]) [facility] PATIENTS: Contact Interventional Radiology at [phone]) if any of the following occur:     · You have a severe headache or dizziness      · Your heart is beating faster than usual     · You have a fever or chills      · Your skin is itchy, swollen, or you have a rash      · You have nausea or are vomiting for more than 8 hours after the procedure       · You have questions or concerns about your condition or care  Self-care:   · Have someone stay with you for 24 hours   This person can drive you to errands and help you do things around the house  This person can also watch for problems       · Rest and do quiet activities for 24 hours  Do not exercise, ride a bike, or play sports  Stand up slowly to prevent dizziness and falls  Take short walks around the house with another person  Slowly return to your usual activities the next day       · Do not drive or use dangerous machines or tools for 24 hours  You may injure yourself or others  Examples include a lawnmower, saw, or drill  Do not return to work for 24 hours if you use dangerous machines or tools for work       · Do not make important decisions for 24 hours  For example, do not sign important papers or invest money       · Drink liquids as directed  Liquids help flush the sedation medicine out of your body  Ask how much liquid to drink each day and which liquids are best for you       · Eat small, frequent meals to prevent nausea and vomiting  Start with clear liquids such as juice or broth  If you do not vomit after clear liquids, you can eat your usual foods       · Do not drink alcohol or take medicines that make you drowsy  This includes medicines that help you sleep and anxiety medicines  Ask your healthcare provider if it is safe for you to take pain medicine  Follow up with your healthcare provider as directed: Write down your questions so you remember to ask them during your visits

## 2021-02-16 NOTE — BRIEF OP NOTE (RAD/CATH)
IR TUNNELED CENTRAL LINE PLACEMENT  Procedure Note    PATIENT NAME: Gabe Mendez  : 1985  MRN: [id number]     Pre-op Diagnosis:   1  [facility]      Post-op Diagnosis:   1  ESRD (end stage renal disease) [facility])        Surgeon:   Kailash Dill DO  Assistants:     No qualified resident was available  Estimated Blood Loss: none  Findings: 16F x 28 cm TDC via R IJ with tip in RA      Specimens: none    Complications:  none    Anesthesia: conscious sedation and local    Kailash Dill DO     Date: 2021  Time: 3:59 PM

## 2021-02-16 NOTE — NURSING NOTE
Pt slept after returning to [facility] from IR, mild discomfort at procedural site, not requiring pain medication  Tolerated liquids  Mother and pt verbalize an understanding of all instructions, printed instructions given    Dressing to central line placement site remains clean, dry, and intact

## 2021-02-17 ENCOUNTER — PREP FOR PROCEDURE (OUTPATIENT)
Dept: INTERVENTIONAL RADIOLOGY/VASCULAR | Facility: CLINIC | Age: 36
End: 2021-02-17

## 2021-02-17 ENCOUNTER — TRANSCRIBE ORDERS (OUTPATIENT)
Dept: RADIOLOGY | Facility: HOSPITAL | Age: 36
End: 2021-02-17

## 2021-02-17 DIAGNOSIS — N18.6 ESRD (END STAGE RENAL DISEASE) (HCC): Primary | ICD-10-CM

## 2021-02-18 ENCOUNTER — TELEPHONE (OUTPATIENT)
Dept: INTERVENTIONAL RADIOLOGY/VASCULAR | Facility: HOSPITAL | Age: 36
End: 2021-02-18

## 2021-02-18 ENCOUNTER — ANESTHESIA EVENT (OUTPATIENT)
Dept: GASTROENTEROLOGY | Facility: AMBULARY SURGERY CENTER | Age: 36
End: 2021-02-18

## 2021-02-18 ENCOUNTER — HOSPITAL ENCOUNTER (EMERGENCY)
Facility: HOSPITAL | Age: 36
Discharge: HOME | End: 2021-02-18
Attending: EMERGENCY MEDICINE
Payer: MEDICARE

## 2021-02-18 VITALS
SYSTOLIC BLOOD PRESSURE: 181 MMHG | DIASTOLIC BLOOD PRESSURE: 88 MMHG | OXYGEN SATURATION: 99 % | RESPIRATION RATE: 16 BRPM | TEMPERATURE: 98.5 F | HEART RATE: 74 BPM

## 2021-02-18 DIAGNOSIS — Z45.2 ENCOUNTER FOR CARE RELATED TO VASCULAR ACCESS PORT: Primary | ICD-10-CM

## 2021-02-18 PROCEDURE — 99282 EMERGENCY DEPT VISIT SF MDM: CPT | Performed by: EMERGENCY MEDICINE

## 2021-02-18 PROCEDURE — 99283 EMERGENCY DEPT VISIT LOW MDM: CPT

## 2021-02-18 NOTE — DISCHARGE INSTRUCTIONS
Gelfoam & Surgicel were used beneath your dressing  If bleeding recurs apply pressure continuously for 10-15 minutes at the site where the catheter enters your skin  Call the Interventional Radiology # if you have ongoing bleeding/ additional concerns  Tunneled Central Lines   WHAT YOU NEED TO KNOW:   A tunneled central line is a type of long-term IV catheter  A catheter is a flexible tube used to give treatments and to take blood  You can see the catheter under your skin before it enters a vein near your heart  You will need to flush and care for your central line as directed  DISCHARGE INSTRUCTIONS:   Call your local emergency number (911 in the [address] [facility]) for any of the following: You have pain in your arm, neck, shoulder, or chest     You cough up blood  You have chest pain or trouble breathing  Seek care immediately if:   The catheter site turns cold, changes color, or you cannot feel it  You see blisters on your skin around where the catheter enters it  Call your doctor if:   You have a fever  The catheter site is red, warm, painful, or is oozing fluid  You see blood on your bandage and the amount is increasing  The veins in your neck or chest bulge  You cannot flush your catheter, or you feel pain when you flush your catheter  You see that the catheter is getting shorter, or it falls out  You see a hole or a crack in your catheter  Clamp your catheter above the damage before you call your doctor  You have questions about how to care for your catheter  What you need to remember about the central line: The following can help prevent an infection or other problems:  Clean and change the catheter parts as often as directed  You will be shown how to clean the caps, hubs, and injection ports  Always clean the parts before you attach and after you remove tubing from your catheter  Use a new alcohol pad for each part you clean  Throw away used alcohol pads   You may need to use extra tubing to get medicine  Ask your healthcare provider how often to change the caps and the medicine tubing  Flush your catheter before and after you use the central line  Your healthcare provider may give you syringes filled with saline (salt water) or heparin (a blood thinner) to flush your catheter  Stop  if it is difficult to push the plunger  Do not force the saline or heparin into your catheter  This could damage the catheter or your vein  The force could also cause a blood clot to move into your blood  Stop when about 1 milliliter (mL) is left in the syringe  This will keep any air bubbles in the syringe  Clamp the catheter as needed  You may need to clamp your catheter at certain times, such as when the caps and tubing are being changed  The catheter is clamped to help prevent air from getting in  Loop and secure extra tubing  Loosely loop the tubing  Secure it to your arm with medical tape  This will help prevent the catheter from being pulled out by accident  Ask about activity  Your healthcare provider will tell you which activities are safe for you  You may not be able to play contact sports until the catheter is removed  Prevent an infection:  The area around your catheter may get infected, or you may get an infection in your bloodstream  A bloodstream infection is called a central line-associated bloodstream infection (CLABSI)  A CLABSI is caused by bacteria getting into your bloodstream through your catheter  This can lead to severe illness  The following are ways you can help prevent a CLABSI:  Wash your hands often  Use soap or an alcohol-based hand rub to clean your hands  Clean your hands before and after you touch the catheter or the catheter site  Remind anyone who cares for your catheter to wash their hands  Limit contact with the catheter  Do not touch or handle your catheter unless you need to care for it   Do not pull, push on, or move the catheter when you clean your skin or change the dressing  Wear clean medical gloves when you touch your catheter or change dressings  Keep the area covered and dry  Keep a sterile dressing over the catheter site  Wrap the insertion site with plastic and seal it with medical tape before you bathe  Take showers instead of baths  Do not swim or soak in a hot tub  Follow up with your doctor as directed: You may need to have the stitches taken out  Write down your questions so you remember to ask them during your visits  © Copyright [address] Information is for End User's use only and may not be sold, redistributed or otherwise used for commercial purposes  All illustrations and images included in CareNotes® are the copyrighted property of [employer]  or [address] [place] [address]  The above information is an  only  It is not intended as medical advice for individual conditions or treatments  Talk to your doctor, nurse or pharmacist before following any medical regimen to see if it is safe and effective for you

## 2021-02-18 NOTE — ED PROVIDER NOTES
History  Chief Complaint   Patient presents with    Vascular Access Problem     Pt presents to the ED with blood around dialysis port  33-year-old male presents to the emergency department for evaluation with bleeding around right chest wall tunneled dialysis catheter  He has end-stage renal disease and undergoes dialysis on Mondays, Wednesdays and Fridays  His right chest wall tunneled catheter was removed Friday as use of his fistula issue with anticipated for future sessions  He had a new catheter placed on Tuesday (2 days ago) as fistula could not be used on Monday for his dialysis session  Today after napping bleeding was appreciated around the catheter site  Mother notes that she was advised the by nurse on the phone to take the bandage down and to recover the area  Of concern for some ongoing bleeding  That presented for evaluation  Patient feels well  He notes right very mild soreness in the area since the catheter was placed  This has not worsened  He does not have any other chest discomfort, dyspnea or lightheadedness  He has not been febrile and has not felt ill  He does take aspirin 81 mg daily though no other anti-platelet or anticoagulant medications  Medical history significant for hypertension, diabetes, CKD, CVA and GERD  Prior to Admission Medications   Prescriptions Last Dose Informant Patient Reported? Taking? B-D ULTRAFINE III SHORT PEN 31G X 8 MM MISC  Self No No   Sig: USE 1 PEN NEEDLE 8 TIMES DAILY   GLUCAGON EMERGENCY 1 MG injection  Self Yes No   Sig: INJECT 1MG SUBCUTANEOUSLY AS NEEDED (AS DIRECTED)   MAY REPEAT DOSE EVERY 20 MINUTES AS NEEDED   NIFEdipine ER (ADALAT CC) 60 MG 24 hr tablet  Self No No   Sig: Take 1 tablet (60 mg total) by mouth 2 (two) times a day   aspirin 81 mg chewable tablet  Self Yes No   Sig: Chew 81 mg daily   atorvastatin (LIPITOR) 40 mg tablet  Self No No   Sig: Take 1 tablet (40 mg total) by mouth every evening   b complex vitamins capsule  Self Yes No   Sig: Take 1 capsule by mouth daily before lunch    bisacodyl (DULCOLAX) 5 mg EC tablet  Self No No   Sig: Take 2 tablets (10 mg total) by mouth once for 1 dose   bismuth subsalicylate (PEPTO BISMOL) 524 mg/30 mL oral suspension  Self No No   Sig: Take 15 mL (262 mg total) by mouth 2 (two) times a day as needed for diarrhea   cholecalciferol (VITAMIN D3) 1,000 units tablet  Self Yes No   Sig: Take 1,000 Units by mouth daily   cloNIDine (CATAPRES-TTS-3) 0 3 mg/24 hr  Self Yes No   Sig: clonidine 0 3 mg/24 hr weekly transdermal patch   APPLY 1 PATCH TOPICALLY ONCE A WEEK   doxazosin (CARDURA) 2 mg tablet  Self No No   Sig: Take 1 tablet (2 mg total) by mouth daily at bedtime   doxazosin (CARDURA) 8 MG tablet   Yes No   Sig: Take 8 mg by mouth daily at bedtime   escitalopram (LEXAPRO) 10 mg tablet  Self No No   Sig: Take 1 tablet (10 mg total) by mouth daily   famotidine (PEPCID) 40 MG tablet  Self No No   Sig: Take 1 tablet (40 mg total) by mouth 2 (two) times a day   gabapentin (NEURONTIN) 100 mg capsule   No No   Sig: Take 2 tablets at bedtime   hydrALAZINE (APRESOLINE) 100 MG tablet  Self No No   Sig: Take 0 5 tablets (50 mg total) by mouth 2 (two) times a day   Patient taking differently: Take 50 mg by mouth 3 (three) times a day    insulin glargine (Basaglar KwikPen) 100 units/mL injection pen  Self No No   Sig: Inject 14 Units under the skin daily at bedtime   Patient taking differently: Inject 6 Units under the skin daily at bedtime    insulin lispro (HumaLOG) 100 units/mL injection  Self No No   Sig: Inject 4 Units under the skin 3 (three) times a day with meals   labetalol (NORMODYNE) 300 mg tablet  Self No No   Sig: Take 1 tablet (300 mg total) by mouth every 12 (twelve) hours   Patient taking differently: Take 300 mg by mouth daily    lisinopril (ZESTRIL) 20 mg tablet  Self No No   Sig: Take 1 tablet (20 mg total) by mouth daily   loperamide (IMODIUM) 2 mg capsule  Self No No   Sig: Take 1 capsule (2 mg total) by mouth 3 (three) times a day as needed for diarrhea   metolazone (ZAROXOLYN) 10 mg tablet  Self Yes No   Sig: Take 10 mg by mouth daily   minoxidil (LONITEN) 2 5 mg tablet   Yes No   Sig: TAKE 1 2 (ONE HALF) TABLET BY MOUTH TWICE DAILY   ondansetron (ZOFRAN) 4 mg tablet  Self Yes No   Sig: Take 4 mg by mouth every 8 (eight) hours as needed for nausea or vomiting   polyethylene glycol (GOLYTELY) 4000 mL solution   No No   Sig: Take 4,000 mL by mouth once for 1 dose   saccharomyces boulardii (FLORASTOR) 250 mg capsule  Self No No   Sig: Take 1 capsule (250 mg total) by mouth 2 (two) times a day   sevelamer (RENAGEL) 800 mg tablet  Self No No   Sig: Take 3 tablets (2,400 mg total) by mouth 3 (three) times a day with meals   sevelamer carbonate (Renvela) 800 mg tablet  Self Yes No   Sig: 3 (three) times a day with meals    torsemide (DEMADEX) 100 mg tablet  Self Yes No   Sig: Take 100 mg by mouth daily      Facility-Administered Medications: None       Past Medical History:   Diagnosis Date    Acute kidney injury ([facility] [address] )     Ambulates with cane     Anuria     Anxiety     Chronic kidney disease     Depression     Diabetes mellitus ([address] )     Diarrhea     Falls     Gastroparesis     GERD (gastroesophageal reflux disease)     History of shingles 2010    History of transfusion 02/2018    no adverse reaction    Hyperlipidemia     Hyperphosphatemia     Hypertension     Itching     Muscle weakness     general unsteadiness    Retinopathy     Seizures ([address] )     early 2020 - one time    Skin abnormality     some dime size areas where skin was scratched from itching    Stroke ([address] )     x2 - off balance/no driving/fatigue    Swelling of both lower extremities     Vomiting     Wears glasses        Past Surgical History:   Procedure Laterality Date    CARDIAC LOOP RECORDER  05/2018    COLONOSCOPY      EGD      EYE SURGERY Right     IR TUNNELED CENTRAL LINE PLACEMENT  2/16/2021  IR TUNNELED DIALYSIS CATHETER PLACEMENT  11/18/2020    IR TUNNELED DIALYSIS CATHETER REMOVAL  2/12/2021    PERITONEAL CATHETER INSERTION N/A 8/27/2018    Procedure: UNROOF PD CATHETER;  Surgeon: Delisa Nickerson DO;  [facility];  Service: General    OH ANASTOMOSIS,AV,ANY SITE Left 11/9/2020    Procedure: CREATION FISTULA  ARTERIOVENOUS (AV) - LEFT WRIST;  Surgeon: Teresa Alfaro MD;  [facility];  Service: Vascular    OH ESOPHAGOGASTRODUODENOSCOPY TRANSORAL DIAGNOSTIC N/A 4/18/2019    Procedure: ESOPHAGOGASTRODUODENOSCOPY (EGD); Surgeon: Selin Hewitt MD;  [facility]; Service: Gastroenterology    OH LAP INSERTION TUNNELED INTRAPERITONEAL CATHETER N/A 8/6/2018    Procedure: LAPAROSCOPIC PD CATHETER PLACEMENT;  Surgeon: Delisa Nickerson DO;  [facility];  Service: General    OH REMOVAL TUNNELED INTRAPERITONEAL CATHETER N/A 11/18/2020    Procedure: REMOVAL CATHETER PERITONEAL DIALYSIS;  Surgeon: Tyron Calderon MD;  [facility];  Service: General    TONSILLECTOMY      UPPER GASTROINTESTINAL ENDOSCOPY         Family History   Problem Relation Age of Onset    Breast cancer Mother     Hypertension Mother     Hyperlipidemia Father     Hypertension Father     Leukemia Maternal Grandmother     Hyperlipidemia Maternal Grandfather     Hypertension Maternal Grandfather     Hyperlipidemia Paternal Grandmother     Hypertension Paternal Grandmother     Heart disease Paternal Grandfather         cardiac disorder    Diabetes Paternal Grandfather      I have reviewed and agree with the history as documented      E-Cigarette/Vaping    E-Cigarette Use Current Every Day User     Comments medical marijuana      E-Cigarette/Vaping Substances    Nicotine No     THC Yes     CBD Yes     Flavoring No     Other No     Unknown No      Social History     Tobacco Use    Smoking status: Former Smoker     Packs/day: 0 50     Years: 12 00     Pack years: 6 00     Types: Cigarettes     Quit date: 02/2018     Years since quitting: 3 0    Smokeless tobacco: Never Used    Tobacco comment: quit 2/2018   Substance Use Topics    Alcohol use: Not Currently    Drug use: Yes     Types: Marijuana     Comment: medical marijuana       Review of Systems   All other systems reviewed and are negative  Physical Exam  Physical Exam  Vitals signs and nursing note reviewed  Constitutional:       Appearance: Normal appearance  Comments: Patient very pleasant and with clear speech  He is comfortable appearing   HENT:      Head: Normocephalic  Mouth/Throat:      Mouth: Mucous membranes are moist    Eyes:      Extraocular Movements: Extraocular movements intact  Conjunctiva/sclera: Conjunctivae normal    Cardiovascular:      Rate and Rhythm: Normal rate and regular rhythm  Pulmonary:      Effort: Pulmonary effort is normal       Breath sounds: Normal breath sounds  Chest:          Comments: Clot gradually removed from catheter  Very very minimal oozing persisted at the underside of the insertion site  Gelfoam was placed beneath the catheter and Surgicel above this after the entire site was prepped chlorhexidine  Large occlusive catheter dressing placed over this by MIKAEL Goldstein  Neurological:      Mental Status: He is alert  Vital Signs  ED Triage Vitals [02/18/21 1403]   Temperature Pulse Respirations Blood Pressure SpO2   98 5 °F (36 9 °C) 74 16 (!) 181/88 99 %      Temp Source Heart Rate Source Patient Position - Orthostatic VS BP Location FiO2 (%)   Oral Monitor Sitting Right arm --      Pain Score       --           Vitals:    02/18/21 1403   BP: (!) 181/88   Pulse: 74   Patient Position - Orthostatic VS: Sitting         Visual Acuity      ED Medications  Medications - No data to display    Diagnostic Studies  Results Reviewed     None                 No orders to display              Procedures  Procedures         ED Course     Pt   Clinically well & here after having had bleeding from dual lumen catheter site  Hemostatic on arrival   Panfilo arreguin after site fully cleansed  Gelfoam & surgical applied  Discussed pressure application to site X continuous 10 min prn rebleeding & seeking care of bleeding persists or is very heavy  Pt  Has HD tomorrow at which point site will be re-evaluated  MDM    Disposition  Final diagnoses:   Encounter for care related to vascular access port     Time reflects when diagnosis was documented in both MDM as applicable and the Disposition within this note     Time User Action Codes Description Comment    2/18/2021  2:31 PM Kerry HESS Add [Z45 2] Encounter for care related to vascular access port       ED Disposition     ED Disposition Condition Date/Time Comment    Discharge Stable u Feb 18, 2021  2:31 PM Polo Candelario discharge to home/self care              Follow-up Information     Follow up With Specialties Details Why Contact Info Additional [address] Interventional Radiology Radiology Call  if you have concerns related to your catheter site [address]   [phone] [address] Interventional Radiology, [address] [place], [place], [address]          Discharge Medication List as of 2/18/2021  2:36 PM      CONTINUE these medications which have NOT CHANGED    Details   aspirin 81 mg chewable tablet Chew 81 mg daily, Historical Med      atorvastatin (LIPITOR) 40 mg tablet Take 1 tablet (40 mg total) by mouth every evening, Starting Tue 9/8/2020, Normal      b complex vitamins capsule Take 1 capsule by mouth daily before lunch , Historical Med      B-D ULTRAFINE III SHORT PEN 31G X 8 MM MISC USE 1 PEN NEEDLE 8 TIMES DAILY, Normal      bisacodyl (DULCOLAX) 5 mg EC tablet Take 2 tablets (10 mg total) by mouth once for 1 dose, Starting Mon 1/18/2021, Normal bismuth subsalicylate (PEPTO BISMOL) 524 mg/30 mL oral suspension Take 15 mL (262 mg total) by mouth 2 (two) times a day as needed for diarrhea, Starting Mon 10/26/2020, Normal      cholecalciferol (VITAMIN D3) 1,000 units tablet Take 1,000 Units by mouth daily, Historical Med      cloNIDine (CATAPRES-TTS-3) 0 3 mg/24 hr clonidine 0 3 mg/24 hr weekly transdermal patch   APPLY 1 PATCH TOPICALLY ONCE A WEEK, Historical Med      !! doxazosin (CARDURA) 2 mg tablet Take 1 tablet (2 mg total) by mouth daily at bedtime, Starting Tue 3/3/2020, Normal      !! doxazosin (CARDURA) 8 MG tablet Take 8 mg by mouth daily at bedtime, Historical Med      escitalopram (LEXAPRO) 10 mg tablet Take 1 tablet (10 mg total) by mouth daily, Starting Thu 1/2/2020, Normal      famotidine (PEPCID) 40 MG tablet Take 1 tablet (40 mg total) by mouth 2 (two) times a day, Starting Mon 12/7/2020, Normal      gabapentin (NEURONTIN) 100 mg capsule Take 2 tablets at bedtime, Normal      GLUCAGON EMERGENCY 1 MG injection INJECT 1MG SUBCUTANEOUSLY AS NEEDED (AS DIRECTED)   MAY REPEAT DOSE EVERY 20 MINUTES AS NEEDED, Historical Med      hydrALAZINE (APRESOLINE) 100 MG tablet Take 0 5 tablets (50 mg total) by mouth 2 (two) times a day, Starting Thu 11/19/2020, Normal      insulin glargine (Basaglar KwikPen) 100 units/mL injection pen Inject 14 Units under the skin daily at bedtime, Starting Thu 12/17/2020, No Print      insulin lispro (HumaLOG) 100 units/mL injection Inject 4 Units under the skin 3 (three) times a day with meals, Starting Wed 11/25/2020, Normal      labetalol (NORMODYNE) 300 mg tablet Take 1 tablet (300 mg total) by mouth every 12 (twelve) hours, Starting Tue 3/3/2020, Normal      lisinopril (ZESTRIL) 20 mg tablet Take 1 tablet (20 mg total) by mouth daily, Starting Tue 10/27/2020, Normal      loperamide (IMODIUM) 2 mg capsule Take 1 capsule (2 mg total) by mouth 3 (three) times a day as needed for diarrhea, Starting Mon 10/26/2020, Normal      metolazone (ZAROXOLYN) 10 mg tablet Take 10 mg by mouth daily, Historical Med      minoxidil (LONITEN) 2 5 mg tablet TAKE 1 2 (ONE HALF) TABLET BY MOUTH TWICE DAILY, Historical Med      NIFEdipine ER (ADALAT CC) 60 MG 24 hr tablet Take 1 tablet (60 mg total) by mouth 2 (two) times a day, Starting Tue 3/3/2020, No Print      ondansetron (ZOFRAN) 4 mg tablet Take 4 mg by mouth every 8 (eight) hours as needed for nausea or vomiting, Historical Med      polyethylene glycol (GOLYTELY) 4000 mL solution Take 4,000 mL by mouth once for 1 dose, Starting Mon 1/18/2021, Normal      saccharomyces boulardii (FLORASTOR) 250 mg capsule Take 1 capsule (250 mg total) by mouth 2 (two) times a day, Starting Mon 10/26/2020, Normal      sevelamer (RENAGEL) 800 mg tablet Take 3 tablets (2,400 mg total) by mouth 3 (three) times a day with meals, Starting Wed 11/25/2020, Normal      sevelamer carbonate (Renvela) 800 mg tablet 3 (three) times a day with meals , Historical Med      torsemide (DEMADEX) 100 mg tablet Take 100 mg by mouth daily, Historical Med       !! - Potential duplicate medications found  Please discuss with provider  No discharge procedures on file      PDMP Review       Value Time User    PDMP Reviewed  Yes 11/11/2020 10:46 AM Yarelis Cantor PA-C          ED Provider  Electronically Signed by           Feli Jean MD  02/18/21 [id number]

## 2021-02-18 NOTE — ED NOTES
HD cath dsg removed, cleaned and old drainage/blood removed  New CHG dsg applied and reinforced       Kathrine Landau, RN  02/18/21 1878

## 2021-02-18 NOTE — H&P (VIEW-ONLY)
History  Chief Complaint   Patient presents with    Vascular Access Problem     Pt presents to the ED with blood around dialysis port  68-year-old male presents to the emergency department for evaluation with bleeding around right chest wall tunneled dialysis catheter  He has end-stage renal disease and undergoes dialysis on Mondays, Wednesdays and Fridays  His right chest wall tunneled catheter was removed Friday as use of his fistula issue with anticipated for future sessions  He had a new catheter placed on Tuesday (2 days ago) as fistula could not be used on Monday for his dialysis session  Today after napping bleeding was appreciated around the catheter site  Mother notes that she was advised the by nurse on the phone to take the bandage down and to recover the area  Of concern for some ongoing bleeding  That presented for evaluation  Patient feels well  He notes right very mild soreness in the area since the catheter was placed  This has not worsened  He does not have any other chest discomfort, dyspnea or lightheadedness  He has not been febrile and has not felt ill  He does take aspirin 81 mg daily though no other anti-platelet or anticoagulant medications  Medical history significant for hypertension, diabetes, CKD, CVA and GERD  Prior to Admission Medications   Prescriptions Last Dose Informant Patient Reported? Taking? B-D ULTRAFINE III SHORT PEN 31G X 8 MM MISC  Self No No   Sig: USE 1 PEN NEEDLE 8 TIMES DAILY   GLUCAGON EMERGENCY 1 MG injection  Self Yes No   Sig: INJECT 1MG SUBCUTANEOUSLY AS NEEDED (AS DIRECTED)   MAY REPEAT DOSE EVERY 20 MINUTES AS NEEDED   NIFEdipine ER (ADALAT CC) 60 MG 24 hr tablet  Self No No   Sig: Take 1 tablet (60 mg total) by mouth 2 (two) times a day   aspirin 81 mg chewable tablet  Self Yes No   Sig: Chew 81 mg daily   atorvastatin (LIPITOR) 40 mg tablet  Self No No   Sig: Take 1 tablet (40 mg total) by mouth every evening   b complex vitamins capsule  Self Yes No   Sig: Take 1 capsule by mouth daily before lunch    bisacodyl (DULCOLAX) 5 mg EC tablet  Self No No   Sig: Take 2 tablets (10 mg total) by mouth once for 1 dose   bismuth subsalicylate (PEPTO BISMOL) 524 mg/30 mL oral suspension  Self No No   Sig: Take 15 mL (262 mg total) by mouth 2 (two) times a day as needed for diarrhea   cholecalciferol (VITAMIN D3) 1,000 units tablet  Self Yes No   Sig: Take 1,000 Units by mouth daily   cloNIDine (CATAPRES-TTS-3) 0 3 mg/24 hr  Self Yes No   Sig: clonidine 0 3 mg/24 hr weekly transdermal patch   APPLY 1 PATCH TOPICALLY ONCE A WEEK   doxazosin (CARDURA) 2 mg tablet  Self No No   Sig: Take 1 tablet (2 mg total) by mouth daily at bedtime   doxazosin (CARDURA) 8 MG tablet   Yes No   Sig: Take 8 mg by mouth daily at bedtime   escitalopram (LEXAPRO) 10 mg tablet  Self No No   Sig: Take 1 tablet (10 mg total) by mouth daily   famotidine (PEPCID) 40 MG tablet  Self No No   Sig: Take 1 tablet (40 mg total) by mouth 2 (two) times a day   gabapentin (NEURONTIN) 100 mg capsule   No No   Sig: Take 2 tablets at bedtime   hydrALAZINE (APRESOLINE) 100 MG tablet  Self No No   Sig: Take 0 5 tablets (50 mg total) by mouth 2 (two) times a day   Patient taking differently: Take 50 mg by mouth 3 (three) times a day    insulin glargine (Basaglar KwikPen) 100 units/mL injection pen  Self No No   Sig: Inject 14 Units under the skin daily at bedtime   Patient taking differently: Inject 6 Units under the skin daily at bedtime    insulin lispro (HumaLOG) 100 units/mL injection  Self No No   Sig: Inject 4 Units under the skin 3 (three) times a day with meals   labetalol (NORMODYNE) 300 mg tablet  Self No No   Sig: Take 1 tablet (300 mg total) by mouth every 12 (twelve) hours   Patient taking differently: Take 300 mg by mouth daily    lisinopril (ZESTRIL) 20 mg tablet  Self No No   Sig: Take 1 tablet (20 mg total) by mouth daily   loperamide (IMODIUM) 2 mg capsule  Self No No   Sig: Take 1 capsule (2 mg total) by mouth 3 (three) times a day as needed for diarrhea   metolazone (ZAROXOLYN) 10 mg tablet  Self Yes No   Sig: Take 10 mg by mouth daily   minoxidil (LONITEN) 2 5 mg tablet   Yes No   Sig: TAKE 1 2 (ONE HALF) TABLET BY MOUTH TWICE DAILY   ondansetron (ZOFRAN) 4 mg tablet  Self Yes No   Sig: Take 4 mg by mouth every 8 (eight) hours as needed for nausea or vomiting   polyethylene glycol (GOLYTELY) 4000 mL solution   No No   Sig: Take 4,000 mL by mouth once for 1 dose   saccharomyces boulardii (FLORASTOR) 250 mg capsule  Self No No   Sig: Take 1 capsule (250 mg total) by mouth 2 (two) times a day   sevelamer (RENAGEL) 800 mg tablet  Self No No   Sig: Take 3 tablets (2,400 mg total) by mouth 3 (three) times a day with meals   sevelamer carbonate (Renvela) 800 mg tablet  Self Yes No   Sig: 3 (three) times a day with meals    torsemide (DEMADEX) 100 mg tablet  Self Yes No   Sig: Take 100 mg by mouth daily      Facility-Administered Medications: None       Past Medical History:   Diagnosis Date    [facility] kidney injury ([facility] [address] )     Ambulates with cane     Anuria     Anxiety     Chronic kidney disease     Depression     Diabetes mellitus ([address] )     Diarrhea     Falls     Gastroparesis     GERD (gastroesophageal reflux disease)     History of shingles 2010    History of transfusion 02/2018    no adverse reaction    Hyperlipidemia     Hyperphosphatemia     Hypertension     Itching     Muscle weakness     general unsteadiness    Retinopathy     Seizures ([address] )     early 2020 - one time    Skin abnormality     some dime size areas where skin was scratched from itching    Stroke ([address] )     x2 - off balance/no driving/fatigue    Swelling of both lower extremities     Vomiting     Wears glasses        Past Surgical History:   Procedure Laterality Date    CARDIAC LOOP RECORDER  05/2018    COLONOSCOPY      EGD      EYE SURGERY Right     IR TUNNELED CENTRAL LINE PLACEMENT  2/16/2021  IR TUNNELED DIALYSIS CATHETER PLACEMENT  11/18/2020    IR TUNNELED DIALYSIS CATHETER REMOVAL  2/12/2021    PERITONEAL CATHETER INSERTION N/A 8/27/2018    Procedure: UNROOF PD CATHETER;  Surgeon: Homero Bee DO;  [facility];  Service: General    MA ANASTOMOSIS,AV,ANY SITE Left 11/9/2020    Procedure: CREATION FISTULA  ARTERIOVENOUS (AV) - LEFT WRIST;  Surgeon: Anju Barrera MD;  [facility];  Service: Vascular    MA ESOPHAGOGASTRODUODENOSCOPY TRANSORAL DIAGNOSTIC N/A 4/18/2019    Procedure: ESOPHAGOGASTRODUODENOSCOPY (EGD); Surgeon: Coreen Cain MD;  [facility]: [facility]; Service: Gastroenterology    MA LAP INSERTION TUNNELED INTRAPERITONEAL CATHETER N/A 8/6/2018    Procedure: LAPAROSCOPIC PD CATHETER PLACEMENT;  Surgeon: Homero Bee DO;  [facility];  Service: General    MA REMOVAL TUNNELED INTRAPERITONEAL CATHETER N/A 11/18/2020    Procedure: REMOVAL CATHETER PERITONEAL DIALYSIS;  Surgeon: Davina Mcfadden MD;  [facility];  Service: General    TONSILLECTOMY      UPPER GASTROINTESTINAL ENDOSCOPY         Family History   Problem Relation Age of Onset    Breast cancer Mother     Hypertension Mother     Hyperlipidemia Father     Hypertension Father     Leukemia Maternal Grandmother     Hyperlipidemia Maternal Grandfather     Hypertension Maternal Grandfather     Hyperlipidemia Paternal Grandmother     Hypertension Paternal Grandmother     Heart disease Paternal Grandfather         cardiac disorder    Diabetes Paternal Grandfather      I have reviewed and agree with the history as documented      E-Cigarette/Vaping    E-Cigarette Use Current Every Day User     Comments medical marijuana      E-Cigarette/Vaping Substances    Nicotine No     THC Yes     CBD Yes     Flavoring No     Other No     Unknown No      Social History     Tobacco Use    Smoking status: Former Smoker     Packs/day: 0 50     Years: 12 00     Pack years: 6 00     Types: Cigarettes     Quit date: 02/2018     Years since quitting: 3 0    Smokeless tobacco: Never Used    Tobacco comment: quit 2/2018   Substance Use Topics    Alcohol use: Not Currently    Drug use: Yes     Types: Marijuana     Comment: medical marijuana       Review of Systems   All other systems reviewed and are negative  Physical Exam  Physical Exam  Vitals signs and nursing note reviewed  Constitutional:       Appearance: Normal appearance  Comments: Patient very pleasant and with clear speech  He is comfortable appearing   HENT:      Head: Normocephalic  Mouth/Throat:      Mouth: Mucous membranes are moist    Eyes:      Extraocular Movements: Extraocular movements intact  Conjunctiva/sclera: Conjunctivae normal    Cardiovascular:      Rate and Rhythm: Normal rate and regular rhythm  Pulmonary:      Effort: Pulmonary effort is normal       Breath sounds: Normal breath sounds  Chest:          Comments: Clot gradually removed from catheter  Very very minimal oozing persisted at the underside of the insertion site  Gelfoam was placed beneath the catheter and Surgicel above this after the entire site was prepped chlorhexidine  Large occlusive catheter dressing placed over this by MIKAEL Goldstein  Neurological:      Mental Status: He is alert  Vital Signs  ED Triage Vitals [02/18/21 1403]   Temperature Pulse Respirations Blood Pressure SpO2   98 5 °F (36 9 °C) 74 16 (!) 181/88 99 %      Temp Source Heart Rate Source Patient Position - Orthostatic VS BP Location FiO2 (%)   Oral Monitor Sitting Right arm --      Pain Score       --           Vitals:    02/18/21 1403   BP: (!) 181/88   Pulse: 74   Patient Position - Orthostatic VS: Sitting         Visual Acuity      ED Medications  Medications - No data to display    Diagnostic Studies  Results Reviewed     None                 No orders to display              Procedures  Procedures         ED Course     Pt   Clinically well & here after having had bleeding from dual lumen catheter site  Hemostatic on arrival   Panfilo arreguin after site fully cleansed  Gelfoam & surgical applied  Discussed pressure application to site X continuous 10 min prn rebleeding & seeking care of bleeding persists or is very heavy  Pt  Has HD tomorrow at which point site will be re-evaluated  MDM    Disposition  Final diagnoses:   Encounter for care related to vascular access port     Time reflects when diagnosis was documented in both MDM as applicable and the Disposition within this note     Time User Action Codes Description Comment    2/18/2021  2:31 PM Agnieszka HESS Add [Z45 2] Encounter for care related to vascular access port       ED Disposition     ED Disposition Condition Date/Time Comment    Discharge Stable Thu Feb 18, 2021  2:31 PM Saeed Hoskins discharge to home/self care              Follow-up Information     Follow up With Specialties Details Why Contact Info Additional [address] Interventional Radiology Radiology Call  if you have concerns related to your catheter site [address] [facility] [address] [facility]   [phone] [address] Interventional Radiology, [address] [place], [place], [address]          Discharge Medication List as of 2/18/2021  2:36 PM      CONTINUE these medications which have NOT CHANGED    Details   aspirin 81 mg chewable tablet Chew 81 mg daily, Historical Med      atorvastatin (LIPITOR) 40 mg tablet Take 1 tablet (40 mg total) by mouth every evening, Starting Tue 9/8/2020, Normal      b complex vitamins capsule Take 1 capsule by mouth daily before lunch , Historical Med      B-D ULTRAFINE III SHORT PEN 31G X 8 MM MISC USE 1 PEN NEEDLE 8 TIMES DAILY, Normal      bisacodyl (DULCOLAX) 5 mg EC tablet Take 2 tablets (10 mg total) by mouth once for 1 dose, Starting Mon 1/18/2021, Normal bismuth subsalicylate (PEPTO BISMOL) 524 mg/30 mL oral suspension Take 15 mL (262 mg total) by mouth 2 (two) times a day as needed for diarrhea, Starting Mon 10/26/2020, Normal      cholecalciferol (VITAMIN D3) 1,000 units tablet Take 1,000 Units by mouth daily, Historical Med      cloNIDine (CATAPRES-TTS-3) 0 3 mg/24 hr clonidine 0 3 mg/24 hr weekly transdermal patch   APPLY 1 PATCH TOPICALLY ONCE A WEEK, Historical Med      !! doxazosin (CARDURA) 2 mg tablet Take 1 tablet (2 mg total) by mouth daily at bedtime, Starting Tue 3/3/2020, Normal      !! doxazosin (CARDURA) 8 MG tablet Take 8 mg by mouth daily at bedtime, Historical Med      escitalopram (LEXAPRO) 10 mg tablet Take 1 tablet (10 mg total) by mouth daily, Starting Thu 1/2/2020, Normal      famotidine (PEPCID) 40 MG tablet Take 1 tablet (40 mg total) by mouth 2 (two) times a day, Starting Mon 12/7/2020, Normal      gabapentin (NEURONTIN) 100 mg capsule Take 2 tablets at bedtime, Normal      GLUCAGON EMERGENCY 1 MG injection INJECT 1MG SUBCUTANEOUSLY AS NEEDED (AS DIRECTED)   MAY REPEAT DOSE EVERY 20 MINUTES AS NEEDED, Historical Med      hydrALAZINE (APRESOLINE) 100 MG tablet Take 0 5 tablets (50 mg total) by mouth 2 (two) times a day, Starting Thu 11/19/2020, Normal      insulin glargine (Basaglar KwikPen) 100 units/mL injection pen Inject 14 Units under the skin daily at bedtime, Starting Thu 12/17/2020, No Print      insulin lispro (HumaLOG) 100 units/mL injection Inject 4 Units under the skin 3 (three) times a day with meals, Starting Wed 11/25/2020, Normal      labetalol (NORMODYNE) 300 mg tablet Take 1 tablet (300 mg total) by mouth every 12 (twelve) hours, Starting Tue 3/3/2020, Normal      lisinopril (ZESTRIL) 20 mg tablet Take 1 tablet (20 mg total) by mouth daily, Starting Tue 10/27/2020, Normal      loperamide (IMODIUM) 2 mg capsule Take 1 capsule (2 mg total) by mouth 3 (three) times a day as needed for diarrhea, Starting Mon 10/26/2020, Normal      metolazone (ZAROXOLYN) 10 mg tablet Take 10 mg by mouth daily, Historical Med      minoxidil (LONITEN) 2 5 mg tablet TAKE 1 2 (ONE HALF) TABLET BY MOUTH TWICE DAILY, Historical Med      NIFEdipine ER (ADALAT CC) 60 MG 24 hr tablet Take 1 tablet (60 mg total) by mouth 2 (two) times a day, Starting Tue 3/3/2020, No Print      ondansetron (ZOFRAN) 4 mg tablet Take 4 mg by mouth every 8 (eight) hours as needed for nausea or vomiting, Historical Med      polyethylene glycol (GOLYTELY) 4000 mL solution Take 4,000 mL by mouth once for 1 dose, Starting Mon 1/18/2021, Normal      saccharomyces boulardii (FLORASTOR) 250 mg capsule Take 1 capsule (250 mg total) by mouth 2 (two) times a day, Starting Mon 10/26/2020, Normal      sevelamer (RENAGEL) 800 mg tablet Take 3 tablets (2,400 mg total) by mouth 3 (three) times a day with meals, Starting Wed 11/25/2020, Normal      sevelamer carbonate (Renvela) 800 mg tablet 3 (three) times a day with meals , Historical Med      torsemide (DEMADEX) 100 mg tablet Take 100 mg by mouth daily, Historical Med       !! - Potential duplicate medications found  Please discuss with provider  No discharge procedures on file      PDMP Review       Value Time User    PDMP Reviewed  Yes 11/11/2020 10:46 AM Grover Ahumada, PA-C          ED Provider  Electronically Signed by           Varsha Barroso MD  02/18/21 7003

## 2021-02-23 ENCOUNTER — HOSPITAL ENCOUNTER (OUTPATIENT)
Dept: INTERVENTIONAL RADIOLOGY/VASCULAR | Facility: HOSPITAL | Age: 36
Discharge: HOME | End: 2021-02-23
Attending: STUDENT IN AN ORGANIZED HEALTH CARE EDUCATION/TRAINING PROGRAM | Admitting: RADIOLOGY
Payer: MEDICARE

## 2021-02-23 VITALS
TEMPERATURE: 97.8 F | HEART RATE: 83 BPM | HEIGHT: 71 IN | DIASTOLIC BLOOD PRESSURE: 81 MMHG | RESPIRATION RATE: 20 BRPM | WEIGHT: 213 LBS | BODY MASS INDEX: 29.82 KG/M2 | OXYGEN SATURATION: 96 % | SYSTOLIC BLOOD PRESSURE: 179 MMHG

## 2021-02-23 DIAGNOSIS — N18.6 ESRD (END STAGE RENAL DISEASE) (HCC): ICD-10-CM

## 2021-02-23 PROCEDURE — 36901 INTRO CATH DIALYSIS CIRCUIT: CPT | Performed by: RADIOLOGY

## 2021-02-23 PROCEDURE — 36909 DIALYSIS CIRCUIT EMBOLJ: CPT

## 2021-02-23 PROCEDURE — C1725 CATH, TRANSLUMIN NON-LASER: HCPCS

## 2021-02-23 PROCEDURE — C1894 INTRO/SHEATH, NON-LASER: HCPCS

## 2021-02-23 PROCEDURE — C1769 GUIDE WIRE: HCPCS

## 2021-02-23 PROCEDURE — 36909 DIALYSIS CIRCUIT EMBOLJ: CPT | Performed by: RADIOLOGY

## 2021-02-23 PROCEDURE — 99152 MOD SED SAME PHYS/QHP 5/>YRS: CPT

## 2021-02-23 PROCEDURE — 82948 REAGENT STRIP/BLOOD GLUCOSE: CPT

## 2021-02-23 PROCEDURE — 99152 MOD SED SAME PHYS/QHP 5/>YRS: CPT | Performed by: RADIOLOGY

## 2021-02-23 PROCEDURE — 99153 MOD SED SAME PHYS/QHP EA: CPT

## 2021-02-23 PROCEDURE — 36902 INTRO CATH DIALYSIS CIRCUIT: CPT

## 2021-02-23 RX ORDER — SODIUM CHLORIDE 9 MG/ML
75 INJECTION, SOLUTION INTRAVENOUS CONTINUOUS
Status: DISCONTINUED | OUTPATIENT
Start: 2021-02-23 | End: 2021-02-27 | Stop reason: HOSPADM

## 2021-02-23 RX ORDER — LIDOCAINE WITH 8.4% SOD BICARB 0.9%(10ML)
SYRINGE (ML) INJECTION CODE/TRAUMA/SEDATION MEDICATION
Status: COMPLETED | OUTPATIENT
Start: 2021-02-23 | End: 2021-02-23

## 2021-02-23 RX ORDER — FENTANYL CITRATE 50 UG/ML
INJECTION, SOLUTION INTRAMUSCULAR; INTRAVENOUS CODE/TRAUMA/SEDATION MEDICATION
Status: COMPLETED | OUTPATIENT
Start: 2021-02-23 | End: 2021-02-23

## 2021-02-23 RX ORDER — VERAPAMIL HYDROCHLORIDE 2.5 MG/ML
INJECTION, SOLUTION INTRAVENOUS CODE/TRAUMA/SEDATION MEDICATION
Status: COMPLETED | OUTPATIENT
Start: 2021-02-23 | End: 2021-02-23

## 2021-02-23 RX ORDER — HEPARIN SODIUM 1000 [USP'U]/ML
INJECTION, SOLUTION INTRAVENOUS; SUBCUTANEOUS CODE/TRAUMA/SEDATION MEDICATION
Status: COMPLETED | OUTPATIENT
Start: 2021-02-23 | End: 2021-02-23

## 2021-02-23 RX ORDER — MIDAZOLAM HYDROCHLORIDE 2 MG/2ML
INJECTION, SOLUTION INTRAMUSCULAR; INTRAVENOUS CODE/TRAUMA/SEDATION MEDICATION
Status: COMPLETED | OUTPATIENT
Start: 2021-02-23 | End: 2021-02-23

## 2021-02-23 RX ADMIN — MIDAZOLAM HYDROCHLORIDE 1 MG: 1 INJECTION, SOLUTION INTRAMUSCULAR; INTRAVENOUS at 16:03

## 2021-02-23 RX ADMIN — FENTANYL CITRATE 50 MCG: 50 INJECTION INTRAMUSCULAR; INTRAVENOUS at 16:03

## 2021-02-23 RX ADMIN — Medication 5 ML: at 15:40

## 2021-02-23 RX ADMIN — VERAPAMIL HYDROCHLORIDE 2.5 MG: 2.5 INJECTION, SOLUTION INTRAVENOUS at 15:46

## 2021-02-23 RX ADMIN — NITROGLYCERIN 200 MCG: 20 INJECTION INTRAVENOUS at 15:44

## 2021-02-23 RX ADMIN — MIDAZOLAM HYDROCHLORIDE 1 MG: 1 INJECTION, SOLUTION INTRAMUSCULAR; INTRAVENOUS at 16:30

## 2021-02-23 RX ADMIN — IODIXANOL 52 ML: 320 INJECTION, SOLUTION INTRAVASCULAR at 17:16

## 2021-02-23 RX ADMIN — FENTANYL CITRATE 50 MCG: 50 INJECTION INTRAMUSCULAR; INTRAVENOUS at 15:35

## 2021-02-23 RX ADMIN — HEPARIN SODIUM 3000 UNITS: 1000 INJECTION INTRAVENOUS; SUBCUTANEOUS at 15:44

## 2021-02-23 RX ADMIN — FENTANYL CITRATE 50 MCG: 50 INJECTION INTRAMUSCULAR; INTRAVENOUS at 16:30

## 2021-02-23 RX ADMIN — MIDAZOLAM HYDROCHLORIDE 1 MG: 1 INJECTION, SOLUTION INTRAMUSCULAR; INTRAVENOUS at 15:35

## 2021-02-23 NOTE — INTERVAL H&P NOTE
The history and physical were reviewed, along with progress notes, and the patient was examined  There are no changes since it was written  The patient has suboptimal maturation of his left upper extremity forearm fistula, and presents for evaluation      /87   Pulse 73   Temp 98 3 °F (36 8 °C) (Temporal)   Resp 18   Ht 5' 11" (1 803 m)   Wt 96 6 kg (213 lb)   SpO2 99%   BMI 29 71 kg/m²        Phi Robbins MD/February 23, 2021/3:00 PM

## 2021-02-23 NOTE — NURSING NOTE
L-Arm dressing dry/intact  Pt had a woggle, but was kept in IR long enough for woggle to be removed and dressing changed  Dr Bigg Pickett at bedside to speak with mother, per request  VSS  Denies pain

## 2021-02-23 NOTE — NURSING NOTE
L-LA Fistula bruit/thrill+ at wrist; decreased thrill upper arm  Pain 2/10 L-arm  Multiple scabs noted on FA  VSS  No distress noted

## 2021-02-23 NOTE — BRIEF OP NOTE (RAD/CATH)
INTERVENTIONAL RADIOLOGY PROCEDURE NOTE    Date: 2/23/2021    Procedure: IR AV FISTULAGRAM/GRAFTOGRAM    Preoperative diagnosis:   1  ESRD (end stage renal disease) ([facility] [address] )         Postoperative diagnosis: Same  Surgeon: Marvin Krueger MD     Assistant: None  No qualified resident was available  Blood loss:  3 mL    Specimens:  None     Findings:  No radial artery stenosis identified  The vessel caliber is noted to slowly decrease from its origin down to the wrist   No arterial anastomotic stenosis identified  Several webs in valves identified within the fistula were successfully treated with 6 mm angioplasty  A large collateral deep vein was shunting a significant amount of flow from the superficial veins  This collateral was embolized with coils to stasis  Complications: None immediate      Anesthesia: conscious sedation

## 2021-02-23 NOTE — DISCHARGE INSTRUCTIONS
Fistulagram   WHAT YOU NEED TO KNOW:   Your arm or leg my  be sore, swollen, and bruised after the procedure  This is normal and should get better in a few days  DISCHARGE INSTRUCTIONS:     Contact Interventional Radiology at [phone] Gus PATIENTS: Contact Interventional Radiology at [phone]) Pilarmarina Ponce PATIENTS: Contact Interventional Radiology at [phone]) if:    · You have a fever or chills  · Your puncture site is red, swollen, or draining pus  · You have nausea or are vomiting  · Your skin is itchy, swollen, or you have a rash  · You cannot feel a thrill over your graft or fistula  · You have questions or concerns about your condition or care  Seek care immediately if:     · You have bleeding that does not stop after 10 minutes of holding firm, direct pressure over the puncture site  · Blood soaks through your bandage  · Your hand or foot closest to the graft or fistula feels cold, painful, or numb  · Your hand or foot closest to the graft or fistula is pale or blue  · You have trouble moving your arm or leg closest to the graft or fistula  · Your bruise suddenly gets bigger  Care for your wound as directed:  Remove the bandage in 4 to 6 hours or as         directed  Wash the area once a day with soap and water  Gently pat the area dry  Apply firm, steady pressure to the puncture site if it bleeds  Use a clean gauze or towel to hold pressure for 10 to 15 minutes  Call 911 if you cannot stop the bleeding or the bleeding gets heavier  Feel for a thrill once a day or as directed  Place your index and second finger over your fistula or graft as directed  You should feel a vibration  The vibration means that blood is flowing through your graft or fistula correctly  Rest your arm or leg as directed  Do not lift anything heavier than 5 pounds or do strenuous activity for 24 hours  Prevent damage to your graft or fistula    Do not wear tight-fitting clothing over your graft or fistula  Do not wear tight jewelry on the arm or leg with the graft or fistula  Tell healthcare providers not to do, IVs, blood draws, and blood pressure readings in the arm with your graft or fistula  Do not allow flu shots or vaccinations in your arm with your graft or fistula  Follow up with your healthcare provider as directedProcedural Sedation   WHAT YOU NEED TO KNOW:   Procedural sedation is medicine used during procedures to help you feel relaxed and calm  You will remember little to none of the procedure  After sedation you may feel tired, weak, or unsteady on your feet  You may also have trouble concentrating or short-term memory loss  These symptoms should go away in 24 hours or less  DISCHARGE INSTRUCTIONS:     Call 911 or have someone else call for any of the following:   · You have sudden trouble breathing      · You cannot be woken  Contact Interventional Radiology at [phone]   [place] PATIENTS: Contact Interventional Radiology at [phone]) Tom Allen PATIENTS: Contact Interventional Radiology at [phone]) if any of the following occur:     · You have a severe headache or dizziness      · Your heart is beating faster than usual     · You have a fever or chills      · Your skin is itchy, swollen, or you have a rash      · You have nausea or are vomiting for more than 8 hours after the procedure       · You have questions or concerns about your condition or care  Self-care:   · Have someone stay with you for 24 hours  This person can drive you to errands and help you do things around the house  This person can also watch for problems       · Rest and do quiet activities for 24 hours  Do not exercise, ride a bike, or play sports  Stand up slowly to prevent dizziness and falls  Take short walks around the house with another person   Slowly return to your usual activities the next day       · Do not drive or use dangerous machines or tools for 24 hours  You may injure yourself or others  Examples include a lawnmower, saw, or drill  Do not return to work for 24 hours if you use dangerous machines or tools for work       · Do not make important decisions for 24 hours  For example, do not sign important papers or invest money       · Drink liquids as directed  Liquids help flush the sedation medicine out of your body  Ask how much liquid to drink each day and which liquids are best for you       · Eat small, frequent meals to prevent nausea and vomiting  Start with clear liquids such as juice or broth  If you do not vomit after clear liquids, you can eat your usual foods       · Do not drink alcohol or take medicines that make you drowsy  This includes medicines that help you sleep and anxiety medicines  Ask your healthcare provider if it is safe for you to take pain medicine  Follow up with your healthcare provider as directed: Write down your questions so you remember to ask them during your visits

## 2021-02-24 LAB — GLUCOSE SERPL-MCNC: 216 MG/DL (ref 65–140)

## 2021-02-25 ENCOUNTER — TELEPHONE (OUTPATIENT)
Dept: GASTROENTEROLOGY | Facility: CLINIC | Age: 36
End: 2021-02-25

## 2021-02-25 ENCOUNTER — TRANSCRIBE ORDERS (OUTPATIENT)
Dept: RADIOLOGY | Facility: HOSPITAL | Age: 36
End: 2021-02-25

## 2021-02-25 DIAGNOSIS — N18.6 ESRD (END STAGE RENAL DISEASE) (HCC): Primary | ICD-10-CM

## 2021-02-25 NOTE — TELEPHONE ENCOUNTER
Spoke with pt's mother, Laisha Nguyen, needed to reschedule procedure due to pt's having a lot of other medical appts the day before procedure  Rescheduled procedure from 3/4 ASC with Dr Jerry Garcia to 4/29 ASC

## 2021-03-04 ENCOUNTER — ANESTHESIA (OUTPATIENT)
Dept: GASTROENTEROLOGY | Facility: AMBULARY SURGERY CENTER | Age: 36
End: 2021-03-04

## 2021-03-11 ENCOUNTER — HOSPITAL ENCOUNTER (OUTPATIENT)
Dept: RADIOLOGY | Facility: HOSPITAL | Age: 36
Discharge: HOME | End: 2021-03-11
Attending: RADIOLOGY | Admitting: RADIOLOGY
Payer: MEDICARE

## 2021-03-11 DIAGNOSIS — N18.6 ESRD (END STAGE RENAL DISEASE) (HCC): ICD-10-CM

## 2021-03-11 PROCEDURE — 36589 REMOVAL TUNNELED CV CATH: CPT

## 2021-03-11 PROCEDURE — NC001 PR NO CHARGE: Performed by: RADIOLOGY

## 2021-03-11 NOTE — BRIEF OP NOTE (RAD/CATH)
INTERVENTIONAL RADIOLOGY PROCEDURE NOTE    Date: 3/11/2021    Procedure: IR TUNNELED DIALYSIS CATHETER REMOVAL    Preoperative diagnosis:   1  ESRD (end stage renal disease) ([facility] [address] )         Postoperative diagnosis: Same  Surgeon: Yeimy Mckinney MD     Assistant: None  No qualified resident was available  Blood loss: 0    Specimens: 0     Findings: R tunneled dialysis catheter removed in entirety    Complications: None immediate      Anesthesia: local

## 2021-03-11 NOTE — DISCHARGE INSTRUCTIONS
Perma-cath Placement   WHAT YOU NEED TO KNOW:   A perma-cath is a catheter placed through a vein into or near your right atrium  Your right atrium is the right upper chamber of your heart  A perma-cath is used for dialysis in an emergency or until a long-term device is ready to use  After your procedure, you will have some pain and swelling on your chest and neck  You may have some bruises on your chest and neck  You may also have 2 dressings, one on your chest and one on your neck  DISCHARGE INSTRUCTIONS:   Call 911 for any of the following:   · You feel lightheaded, short of breath, and have chest pain  · Your catheter comes out   Contact Interventional Radiology at [phone] Gus PATIENTS: Contact Interventional Radiology at [phone]) Neda Dubin PATIENTS: Contact Interventional Radiology at [phone]) if:  · Blood soaks through your bandage  · You have new swelling in your arm, neck, face, or chest on your right side  · Your catheter gets wet  · Your bruises or pain get worse  · You have a fever or chills  · Persistent nausea or vomiting  · Your incision is red, swollen, or draining pus  · You have questions or concerns about your condition or care  Self-care:       · Resume your normal diet  · Keep your dressings dry  Do not take a shower or swim  You may take a tub bath, but do not get your dressings wet  Water in your wound can cause bacteria to grow and cause an infection  If your dressing gets wet, dry it off and cover it with dry sterile gauze  Call your healthcare provider  Do not use soaps or ointments  · Do not change your dressings  Your healthcare provider or dialysis nurse will change your dressings  Your dressings should stay in place until your healthcare provider removes them  The dressing on your chest will stay as long as you have the catheter in place  The dressing prevents infection  · Do not remove the red and blue caps from the end of your catheter   The caps prevent air from getting into your catheter    Follow up with your healthcare provider as directed: Write down your questions so you remember to ask them during your visits

## 2021-03-23 DIAGNOSIS — I16.0 HYPERTENSIVE URGENCY: ICD-10-CM

## 2021-03-23 DIAGNOSIS — R79.89 AZOTEMIA: ICD-10-CM

## 2021-03-23 DIAGNOSIS — N17.0 ACUTE RENAL FAILURE WITH ACUTE TUBULAR NECROSIS SUPERIMPOSED ON STAGE 3 CHRONIC KIDNEY DISEASE (HCC): ICD-10-CM

## 2021-03-23 DIAGNOSIS — N18.30 ACUTE RENAL FAILURE WITH ACUTE TUBULAR NECROSIS SUPERIMPOSED ON STAGE 3 CHRONIC KIDNEY DISEASE (HCC): ICD-10-CM

## 2021-03-23 DIAGNOSIS — Z86.73 HISTORY OF LACUNAR CEREBROVASCULAR ACCIDENT (CVA): ICD-10-CM

## 2021-03-23 DIAGNOSIS — E83.39 HYPERPHOSPHATEMIA: ICD-10-CM

## 2021-03-23 DIAGNOSIS — H53.8 BLURRED VISION: ICD-10-CM

## 2021-03-23 DIAGNOSIS — H46.9 OPTIC NEURITIS DUE TO MULTIPLE SCLEROSIS (HCC): ICD-10-CM

## 2021-03-23 DIAGNOSIS — I63.9 CEREBROVASCULAR ACCIDENT (CVA) OF LEFT PONTINE STRUCTURE (HCC): ICD-10-CM

## 2021-03-23 DIAGNOSIS — E10.21 TYPE 1 DIABETES MELLITUS WITH DIABETIC NEPHROPATHY, WITH LONG-TERM CURRENT USE OF INSULIN (HCC): Chronic | ICD-10-CM

## 2021-03-23 DIAGNOSIS — H53.30 BINOCULAR VISUAL DISTURBANCE: ICD-10-CM

## 2021-03-23 DIAGNOSIS — I63.9 CEREBROVASCULAR ACCIDENT (CVA), UNSPECIFIED MECHANISM (HCC): ICD-10-CM

## 2021-03-23 DIAGNOSIS — E55.9 VITAMIN D DEFICIENCY: ICD-10-CM

## 2021-03-23 DIAGNOSIS — N17.0 ACUTE RENAL FAILURE WITH ACUTE TUBULAR NECROSIS SUPERIMPOSED ON STAGE 2 CHRONIC KIDNEY DISEASE (HCC): ICD-10-CM

## 2021-03-23 DIAGNOSIS — G35 OPTIC NEURITIS DUE TO MULTIPLE SCLEROSIS (HCC): ICD-10-CM

## 2021-03-23 DIAGNOSIS — E72.11 HYPERHOMOCYSTEINEMIA (HCC): ICD-10-CM

## 2021-03-23 DIAGNOSIS — R11.0 NAUSEA: ICD-10-CM

## 2021-03-23 DIAGNOSIS — H51.0 BINOCULAR VISION DISORDER WITH CONJUGATE GAZE PALSY: ICD-10-CM

## 2021-03-23 DIAGNOSIS — R80.1 PERSISTENT PROTEINURIA: ICD-10-CM

## 2021-03-23 DIAGNOSIS — N18.2 ACUTE RENAL FAILURE WITH ACUTE TUBULAR NECROSIS SUPERIMPOSED ON STAGE 2 CHRONIC KIDNEY DISEASE (HCC): ICD-10-CM

## 2021-03-23 RX ORDER — ATORVASTATIN CALCIUM 40 MG/1
40 TABLET, FILM COATED ORAL EVERY EVENING
Qty: 90 TABLET | Refills: 1 | Status: SHIPPED | OUTPATIENT
Start: 2021-03-23 | End: 2021-09-28 | Stop reason: SDUPTHER

## 2021-03-25 ENCOUNTER — OFFICE VISIT (OUTPATIENT)
Dept: CARDIOLOGY CLINIC | Facility: CLINIC | Age: 36
End: 2021-03-25
Payer: MEDICARE

## 2021-03-25 VITALS
DIASTOLIC BLOOD PRESSURE: 60 MMHG | HEIGHT: 71 IN | BODY MASS INDEX: 30.38 KG/M2 | WEIGHT: 217 LBS | SYSTOLIC BLOOD PRESSURE: 110 MMHG | HEART RATE: 80 BPM

## 2021-03-25 DIAGNOSIS — N18.6 ANEMIA IN CHRONIC KIDNEY DISEASE, ON CHRONIC DIALYSIS (HCC): ICD-10-CM

## 2021-03-25 DIAGNOSIS — Z86.73 HISTORY OF LACUNAR CEREBROVASCULAR ACCIDENT (CVA): Chronic | ICD-10-CM

## 2021-03-25 DIAGNOSIS — D63.1 ANEMIA IN CHRONIC KIDNEY DISEASE, ON CHRONIC DIALYSIS (HCC): ICD-10-CM

## 2021-03-25 DIAGNOSIS — I15.0 RENOVASCULAR HYPERTENSION: Primary | ICD-10-CM

## 2021-03-25 DIAGNOSIS — Z99.2 ANEMIA IN CHRONIC KIDNEY DISEASE, ON CHRONIC DIALYSIS (HCC): ICD-10-CM

## 2021-03-25 DIAGNOSIS — N18.6 ESRD (END STAGE RENAL DISEASE) (HCC): ICD-10-CM

## 2021-03-25 PROCEDURE — 99213 OFFICE O/P EST LOW 20 MIN: CPT | Performed by: INTERNAL MEDICINE

## 2021-03-25 RX ORDER — CINACALCET 60 MG/1
120 TABLET, FILM COATED ORAL DAILY
COMMUNITY

## 2021-03-25 RX ORDER — CALCIUM ACETATE 667 MG/1
CAPSULE ORAL
COMMUNITY
Start: 2021-03-12

## 2021-03-25 RX ORDER — SODIUM ZIRCONIUM CYCLOSILICATE 5 G/5G
POWDER, FOR SUSPENSION ORAL SEE ADMIN INSTRUCTIONS
COMMUNITY
Start: 2021-03-16 | End: 2022-05-12

## 2021-03-25 RX ORDER — INSULIN LISPRO 100 U/ML
6 INJECTION, SOLUTION SUBCUTANEOUS
COMMUNITY
Start: 2021-02-05 | End: 2022-07-16

## 2021-03-25 NOTE — PROGRESS NOTES
Cardiology Follow Up    [facility]  1985  [id number]  [facility] [place]  [address] [address] [phone]    1  Renovascular hypertension     2  ESRD (end stage renal disease) ([facility] )     3  Anemia in chronic kidney disease, on chronic dialysis ([facility] )     4  History of lacunar cerebrovascular accident (CVA)           Discussion/Summary: All of his assessed cardiac problems are stable  I have reviewed his medications and made no changes  No cardiac testing is ordered  RTO 1 year  Interval History: He has not had any cardiac problems since his last OV  He is now on HD with access in his L arm  BP is 110/60  He denies CP, SOB  ILR does not show any AF  He has a history of a previous CVA  EF is normal  Nuclear stress test in 2019 did not show any ischemia  He will be seeing the transplant team at hospitals soon and will try to get listed there  Patient Active Problem List   Diagnosis    Type 1 diabetes mellitus ([facility] )    History of lacunar cerebrovascular accident (CVA)    PONV (postoperative nausea and vomiting)    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,      Binocular visual disturbance    Hyperphosphatemia    Vitamin D deficiency    Homozygous MTHFR mutation [id number] ([facility] )    Anemia in chronic kidney disease, on chronic dialysis (HCC)    Persistent proteinuria    Ataxia    Esophagitis    Left atrial dilation    Renovascular hypertension    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)    Dyslipidemia    Strabismus    Diarrhea    Current moderate episode of major depressive disorder without prior episode ([facility] )    Environmental and seasonal allergies    Pruritus    Obesity (BMI 30 0-34  9)    Gastroparesis diabeticorum (HCC)    Peripheral polyneuropathy    Incidental lung nodule, > 3mm and < 8mm    Vertigo    Impaired mobility and ADLs    Diabetic neuropathy ([facility])    Provoked seizure ([place] [address] )    Asterixis    Foot drop, bilateral    ESRD (end stage renal disease) ([place] [address] )    Recurrent peritonitis ([place] [address] ) due to peritoneal dialysis catheter    Gastroparesis    Spontaneous bacterial peritonitis ([facility])    Chronic diarrhea    Secondary hyperparathyroidism ([place] [address] )    Emesis    Orthostatic hypotension    Eosinophilic leukocytosis    Protein-calorie malnutrition (HCC)    End stage renal disease on dialysis due to type 1 diabetes mellitus ([facility])    Tubulovillous adenoma of colon    Gastroesophageal reflux disease without esophagitis     Past Medical History:   Diagnosis Date    Acute kidney injury ([place] [address] )     Ambulates with cane     Anuria     Anxiety     Chronic kidney disease     Depression     Diabetes mellitus ([place] [address] )     Diarrhea     Falls     Gastroparesis     GERD (gastroesophageal reflux disease)     History of shingles 2010    History of transfusion 02/2018    no adverse reaction    Hyperlipidemia     Hyperphosphatemia     Hypertension     Itching     Muscle weakness     general unsteadiness    Retinopathy     Seizures ([place] [address] )     early 2020 - one time    Skin abnormality     some dime size areas where skin was scratched from itching    Stroke ([place] [address] )     x2 - off balance/no driving/fatigue    Swelling of both lower extremities     Vomiting     Wears glasses      Social History     Socioeconomic History    Marital status: Single     Spouse name: Not on file    Number of children: Not on file    Years of education: Not on file    Highest education level: Not on file   Occupational History    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   Social Needs    Financial resource strain: Not on file    Food insecurity     Worry: Not on file     Inability: Not on file    Transportation needs     Medical: No     Non-medical: No   Tobacco Use    Smoking status: Former Smoker     Packs/day: 0 50     Years: 12 00     Pack years:  6 00     Types: Cigarettes     Quit date: 02/2018     Years since quitting: 3 1    Smokeless tobacco: Never Used    Tobacco comment: quit 2/2018   Substance and Sexual Activity    Alcohol use: Not Currently    Drug use: Yes     Types: Marijuana     Comment: medical marijuana    Sexual activity: Not on file   Lifestyle    Physical activity     Days per week: Not on file     Minutes per session: Not on file    Stress: Not on file   Relationships    Social connections     Talks on phone: Not on file     Gets together: Not on file     Attends Oriental orthodox service: Not on file     Active member of club or organization: Not on file     Attends meetings of clubs or organizations: Not on file     Relationship status: Not on file    Intimate partner violence     Fear of current or ex partner: Not on file     Emotionally abused: Not on file     Physically abused: Not on file     Forced sexual activity: Not on file   Other Topics Concern    Not on file   Social History Narrative    Caffeine use    single      Family History   Problem Relation Age of Onset    Breast cancer Mother     Hypertension Mother     Hyperlipidemia Father     Hypertension Father     Leukemia Maternal Grandmother     Hyperlipidemia Maternal Grandfather     Hypertension Maternal Grandfather     Hyperlipidemia Paternal Grandmother     Hypertension Paternal Grandmother     Heart disease Paternal Grandfather         cardiac disorder    Diabetes Paternal Grandfather      Past Surgical History:   Procedure Laterality Date    CARDIAC LOOP RECORDER  05/2018    COLONOSCOPY      EGD      EYE SURGERY Right     IR AV FISTULAGRAM/GRAFTOGRAM  2/23/2021    IR TUNNELED CENTRAL LINE PLACEMENT  2/16/2021    IR TUNNELED DIALYSIS CATHETER PLACEMENT  11/18/2020    IR TUNNELED DIALYSIS CATHETER REMOVAL  2/12/2021    IR TUNNELED DIALYSIS CATHETER REMOVAL  3/11/2021    PERITONEAL CATHETER INSERTION N/A 8/27/2018    Procedure: UNROOF PD CATHETER;  Surgeon: Katia Chavarria DO;  [facility];  Service: General    AK ANASTOMOSIS,AV,ANY SITE Left 11/9/2020    Procedure: CREATION FISTULA  ARTERIOVENOUS (AV) - LEFT WRIST;  Surgeon: Lilly Galeazzi, MD;  [facility];  Service: Vascular    AK ESOPHAGOGASTRODUODENOSCOPY TRANSORAL DIAGNOSTIC N/A 4/18/2019    Procedure: ESOPHAGOGASTRODUODENOSCOPY (EGD); Surgeon: Bashir Hurtado MD;  Location: [facility];   Service: Gastroenterology    AK LAP INSERTION TUNNELED INTRAPERITONEAL CATHETER N/A 8/6/2018    Procedure: LAPAROSCOPIC PD CATHETER PLACEMENT;  Surgeon: Katia Chavarria DO;  [facility];  Service: General    AK REMOVAL TUNNELED INTRAPERITONEAL CATHETER N/A 11/18/2020    Procedure: REMOVAL CATHETER PERITONEAL DIALYSIS;  Surgeon: Eric Koyanagi, MD;  [facility];  Service: General    TONSILLECTOMY      UPPER GASTROINTESTINAL ENDOSCOPY         Current Outpatient Medications:     aspirin 81 mg chewable tablet, Chew 81 mg daily, Disp: , Rfl:     atorvastatin (LIPITOR) 40 mg tablet, Take 1 tablet (40 mg total) by mouth every evening, Disp: 90 tablet, Rfl: 1    b complex vitamins capsule, Take 1 capsule by mouth daily before lunch , Disp: , Rfl:     cholecalciferol (VITAMIN D3) 1,000 units tablet, Take 1,000 Units by mouth daily, Disp: , Rfl:     cinacalcet (SENSIPAR) 60 MG tablet, Take 60 mg by mouth daily Non-dialysis days, Disp: , Rfl:     cloNIDine (CATAPRES-TTS-3) 0 3 mg/24 hr, clonidine 0 3 mg/24 hr weekly transdermal patch  APPLY 1 PATCH TOPICALLY ONCE A WEEK, Disp: , Rfl:     doxazosin (CARDURA) 2 mg tablet, Take 1 tablet (2 mg total) by mouth daily at bedtime (Patient taking differently: Take 2 mg by mouth daily before lunch ), Disp: 30 tablet, Rfl: 0    doxazosin (CARDURA) 8 MG tablet, Take 8 mg by mouth daily at bedtime, Disp: , Rfl:     escitalopram (LEXAPRO) 10 mg tablet, Take 1 tablet (10 mg total) by mouth daily, Disp: 90 tablet, Rfl: 2    famotidine (PEPCID) 40 MG tablet, Take 1 tablet (40 mg total) by mouth 2 (two) times a day (Patient taking differently: Take 40 mg by mouth daily at bedtime as needed ), Disp: 60 tablet, Rfl: 5    gabapentin (NEURONTIN) 100 mg capsule, Take 2 tablets at bedtime, Disp: 60 capsule, Rfl: 5    hydrALAZINE (APRESOLINE) 100 MG tablet, Take 0 5 tablets (50 mg total) by mouth 2 (two) times a day (Patient taking differently: Take 50 mg by mouth 3 (three) times a day ), Disp: 90 tablet, Rfl: 1    insulin glargine (Basaglar KwikPen) 100 units/mL injection pen, Inject 14 Units under the skin daily at bedtime (Patient taking differently: Inject 6 Units under the skin daily at bedtime ), Disp: , Rfl:     labetalol (NORMODYNE) 300 mg tablet, Take 1 tablet (300 mg total) by mouth every 12 (twelve) hours (Patient taking differently: Take 300 mg by mouth 2 (two) times a day ), Disp: 60 tablet, Rfl: 0    lisinopril (ZESTRIL) 20 mg tablet, Take 1 tablet (20 mg total) by mouth daily (Patient taking differently: Take 20 mg by mouth 2 (two) times a day ), Disp: 30 tablet, Rfl: 0    metolazone (ZAROXOLYN) 10 mg tablet, Take 10 mg by mouth daily, Disp: , Rfl:     minoxidil (LONITEN) 2 5 mg tablet, TAKE 1 2 (ONE HALF) TABLET BY MOUTH TWICE DAILY, Disp: , Rfl:     NIFEdipine ER (ADALAT CC) 60 MG 24 hr tablet, Take 1 tablet (60 mg total) by mouth 2 (two) times a day, Disp: 180 tablet, Rfl: 0    saccharomyces boulardii (FLORASTOR) 250 mg capsule, Take 1 capsule (250 mg total) by mouth 2 (two) times a day, Disp: 60 capsule, Rfl: 0    torsemide (DEMADEX) 100 mg tablet, Take 100 mg by mouth 2 (two) times a day , Disp: , Rfl:     Admelog SoloStar 100 units/mL injection pen, , Disp: , Rfl:     B-D ULTRAFINE III SHORT PEN 31G X 8 MM MISC, USE 1 PEN NEEDLE 8 TIMES DAILY, Disp: 100 each, Rfl: 47    bisacodyl (DULCOLAX) 5 mg EC tablet, Take 2 tablets (10 mg total) by mouth once for 1 dose, Disp: 2 tablet, Rfl: 0    bismuth subsalicylate (PEPTO BISMOL) 524 mg/30 mL oral suspension, Take 15 mL (262 mg total) by mouth 2 (two) times a day as needed for diarrhea, Disp: 177 mL, Rfl: 0    calcium acetate (PHOSLO) capsule, TAKE 3 CAPSULES BY MOUTH WITH MEALS, Disp: , Rfl:     GLUCAGON EMERGENCY 1 MG injection, INJECT 1MG SUBCUTANEOUSLY AS NEEDED (AS DIRECTED)  MAY REPEAT DOSE EVERY 20 MINUTES AS NEEDED, Disp: , Rfl: 3    insulin lispro (HumaLOG) 100 units/mL injection, Inject 4 Units under the skin 3 (three) times a day with meals, Disp: 10 mL, Rfl: 0    Lokelma 5 g PACK, , Disp: , Rfl:     loperamide (IMODIUM) 2 mg capsule, Take 1 capsule (2 mg total) by mouth 3 (three) times a day as needed for diarrhea (Patient not taking: Reported on 3/25/2021), Disp: 30 capsule, Rfl: 0    ondansetron (ZOFRAN) 4 mg tablet, Take 4 mg by mouth every 8 (eight) hours as needed for nausea or vomiting, Disp: , Rfl:     polyethylene glycol (GOLYTELY) 4000 mL solution, Take 4,000 mL by mouth once for 1 dose, Disp: 4000 mL, Rfl: 0    sevelamer (RENAGEL) 800 mg tablet, Take 3 tablets (2,400 mg total) by mouth 3 (three) times a day with meals, Disp: 270 tablet, Rfl: 0    sevelamer carbonate (Renvela) 800 mg tablet, 3 (three) times a day with meals , Disp: , Rfl:   Allergies   Allergen Reactions    Sulfa Antibiotics Rash     Vitals:    03/25/21 1410   BP: 110/60   BP Location: Left arm   Cuff Size: Large   Pulse: 80   Weight: 98 4 kg (217 lb)   Height: 5' 11" (1 803 m)     Weight (last 2 days)     Date/Time   Weight    03/25/21 1410   98 4 (217)             Blood pressure 110/60, pulse 80, height 5' 11" (1 803 m), weight 98 4 kg (217 lb)  , Body mass index is 30 27 kg/m²      Labs:  Hospital Outpatient Visit on 02/23/2021   Component Date Value    POC Glucose 02/23/2021 216*   Lab Requisition on 02/19/2021   Component Date Value    [facility]   Component Date Value    POC Glucose 02/16/2021 257*   Admission on 01/03/2021, Discharged on 01/03/2021   Component Date Value    WBC 01/03/2021 4 18*    RBC 01/03/2021 2 56*    Hemoglobin 01/03/2021 7 9*    Hematocrit 01/03/2021 24 5*    MCV 01/03/2021 96     MCH 01/03/2021 30 9     MCHC 01/03/2021 32 2     RDW 01/03/2021 15 3*    MPV 01/03/2021 9 6     Platelets 19/88/9261 257     nRBC 01/03/2021 0     Neutrophils Relative 01/03/2021 61     Immat GRANS % 01/03/2021 0     Lymphocytes Relative 01/03/2021 20     Monocytes Relative 01/03/2021 6     Eosinophils Relative 01/03/2021 12*    Basophils Relative 01/03/2021 1     Neutrophils Absolute 01/03/2021 2 52     Immature Grans Absolute 01/03/2021 0 01     Lymphocytes Absolute 01/03/2021 0 85     Monocytes Absolute 01/03/2021 0 24     Eosinophils Absolute 01/03/2021 0 51     Basophils Absolute 01/03/2021 0 05     Sodium 01/03/2021 134*    Potassium 01/03/2021 4 2     Chloride 01/03/2021 99*    CO2 01/03/2021 29     ANION GAP 01/03/2021 6     BUN 01/03/2021 19     Creatinine 01/03/2021 5 65*    Glucose 01/03/2021 251*    Calcium 01/03/2021 8 5     Corrected Calcium 01/03/2021 9 1     AST 01/03/2021 13     ALT 01/03/2021 13     Alkaline Phosphatase 01/03/2021 92     Total Protein 01/03/2021 6 5     Albumin 01/03/2021 3 3*    Total Bilirubin 01/03/2021 0 45     eGFR 01/03/2021 12    Lab Requisition on 01/03/2021   Component Date Value    Hemoglobin 01/03/2021 6 8*   Lab Requisition on 12/28/2020   Component Date Value    Hemoglobin 12/28/2020 6 9*   Admission on 12/26/2020, Discharged on 12/26/2020   Component Date Value    WBC 12/26/2020 4 92     RBC 12/26/2020 2 43*    Hemoglobin 12/26/2020 7 4*    Hematocrit 12/26/2020 23 2*    MCV 12/26/2020 96     MCH 12/26/2020 30 5     MCHC 12/26/2020 31 9     RDW 12/26/2020 15 9*    MPV 12/26/2020 9 5     Platelets 58/73/1828 261     nRBC 12/26/2020 0     Neutrophils Relative 12/26/2020 56     Immat GRANS % 12/26/2020 0     Lymphocytes Relative 12/26/2020 22     Monocytes Relative 12/26/2020 7     Eosinophils Relative 12/26/2020 14*    Basophils Relative 12/26/2020 1     Neutrophils Absolute 12/26/2020 2 72     Immature Grans Absolute 12/26/2020 0 02     Lymphocytes Absolute 12/26/2020 1 09     Monocytes Absolute 12/26/2020 0 35     Eosinophils Absolute 12/26/2020 0 68*    Basophils Absolute 12/26/2020 0 06     Sodium 12/26/2020 144     Potassium 12/26/2020 4 3     Chloride 12/26/2020 105     CO2 12/26/2020 31     ANION GAP 12/26/2020 8     BUN 12/26/2020 17     Creatinine 12/26/2020 6 67*    Glucose 12/26/2020 87     Calcium 12/26/2020 8 4     Corrected Calcium 12/26/2020 9 0     AST 12/26/2020 9     ALT 12/26/2020 15     Alkaline Phosphatase 12/26/2020 82     Total Protein 12/26/2020 6 0*    Albumin 12/26/2020 3 2*    Total Bilirubin 12/26/2020 0 38     eGFR 12/26/2020 10     Extra Tube 12/26/2020 yes    Lab Requisition on 12/26/2020   Component Date Value    Hemoglobin 12/26/2020 6 7*    Iron Saturation 12/26/2020 27     TIBC 12/26/2020 162*    Iron 12/26/2020 44*    Ferritin 12/26/2020 397*   Admission on 12/14/2020, Discharged on 12/17/2020   Component Date Value    WBC 12/14/2020 6 03     RBC 12/14/2020 2 57*    Hemoglobin 12/14/2020 7 9*    Hematocrit 12/14/2020 24 4*    MCV 12/14/2020 95     MCH 12/14/2020 30 7     MCHC 12/14/2020 32 4     RDW 12/14/2020 15 1     MPV 12/14/2020 10 0     Platelets 03/91/4702 290     nRBC 12/14/2020 0     Neutrophils Relative 12/14/2020 64     Immat GRANS % 12/14/2020 0     Lymphocytes Relative 12/14/2020 17     Monocytes Relative 12/14/2020 6     Eosinophils Relative 12/14/2020 12*    Basophils Relative 12/14/2020 1     Neutrophils Absolute 12/14/2020 3 88     Immature Grans Absolute 12/14/2020 0 01     Lymphocytes Absolute 12/14/2020 1 02     Monocytes Absolute 12/14/2020 0 33     Eosinophils Absolute 12/14/2020 0 72*    Basophils Absolute 12/14/2020 0 07     Sodium 12/14/2020 140     Potassium 12/14/2020 4 6     Chloride 12/14/2020 102     CO2 12/14/2020 29     ANION GAP 12/14/2020 9     BUN 12/14/2020 18     Creatinine 12/14/2020 7 33*    Glucose 12/14/2020 202*    Calcium 12/14/2020 8 4     Corrected Calcium 12/14/2020 9 4     AST 12/14/2020 11     ALT 12/14/2020 13     Alkaline Phosphatase 12/14/2020 80     Total Protein 12/14/2020 5 5*    Albumin 12/14/2020 2 7*    Total Bilirubin 12/14/2020 0 48     eGFR 12/14/2020 9     POC Glucose 12/15/2020 300*    WBC 12/15/2020 8 46     RBC 12/15/2020 2 73*    Hemoglobin 12/15/2020 8 4*    Hematocrit 12/15/2020 25 8*    MCV 12/15/2020 95     MCH 12/15/2020 30 8     MCHC 12/15/2020 32 6     RDW 12/15/2020 15 3*    Platelets 43/68/6652 287     MPV 12/15/2020 9 9     Sodium 12/15/2020 138     Potassium 12/15/2020 5 0     Chloride 12/15/2020 101     CO2 12/15/2020 23     ANION GAP 12/15/2020 14*    BUN 12/15/2020 23     Creatinine 12/15/2020 7 71*    Glucose 12/15/2020 281*    Calcium 12/15/2020 8 7     eGFR 12/15/2020 8     Magnesium 12/15/2020 2 0     Phosphorus 12/15/2020 4 9*    SARS-CoV-2 12/15/2020 Negative     INFLUENZA A PCR 12/15/2020 Negative     INFLUENZA B PCR 12/15/2020 Negative     RSV PCR 12/15/2020 Negative     BETA-HYDROXYBUTYRATE 12/15/2020 0 1     LACTIC ACID 12/15/2020 1 1     POC Glucose 12/15/2020 135     POC Glucose 12/15/2020 55*    POC Glucose 12/15/2020 69     POC Glucose 12/15/2020 42*    Sodium 12/15/2020 144     Potassium 12/15/2020 4 4     Chloride 12/15/2020 106     CO2 12/15/2020 25     ANION GAP 12/15/2020 13     BUN 12/15/2020 25     Creatinine 12/15/2020 8 34*    Glucose 12/15/2020 275*    Calcium 12/15/2020 8 5     eGFR 12/15/2020 7     Magnesium 12/15/2020 1 9     Phosphorus 12/15/2020 4 8*    POC Glucose 12/15/2020 112     POC Glucose 12/15/2020 61*    POC Glucose 12/15/2020 141*    POC Glucose 12/15/2020 172*    Ventricular Rate 12/14/2020 75     Atrial Rate 12/14/2020 78     AR Interval 12/14/2020 158     QRSD Interval 12/14/2020 66     QT Interval 12/14/2020 406     QTC Interval 12/14/2020 454     P Axis 12/14/2020 82     QRS Axis 12/14/2020 76     T Wave Axis 12/14/2020 70     POC Glucose 12/15/2020 292*    POC Glucose 12/15/2020 285*    POC Glucose 12/15/2020 232*    POC Glucose 12/16/2020 225*    POC Glucose 12/16/2020 215*    Sodium 12/16/2020 139     Potassium 12/16/2020 4 5     Chloride 12/16/2020 102     CO2 12/16/2020 25     ANION GAP 12/16/2020 12     BUN 12/16/2020 20     Creatinine 12/16/2020 7 26*    Glucose 12/16/2020 304*    Calcium 12/16/2020 8 6     eGFR 12/16/2020 9     WBC 12/16/2020 5 15     RBC 12/16/2020 2 38*    Hemoglobin 12/16/2020 7 2*    Hematocrit 12/16/2020 22 5*    MCV 12/16/2020 95     MCH 12/16/2020 30 3     MCHC 12/16/2020 32 0     RDW 12/16/2020 15 1     MPV 12/16/2020 10 0     Platelets 62/25/1884 234     nRBC 12/16/2020 0     Neutrophils Relative 12/16/2020 52     Immat GRANS % 12/16/2020 0     Lymphocytes Relative 12/16/2020 27     Monocytes Relative 12/16/2020 8     Eosinophils Relative 12/16/2020 11*    Basophils Relative 12/16/2020 2*    Neutrophils Absolute 12/16/2020 2 69     Immature Grans Absolute 12/16/2020 0 02     Lymphocytes Absolute 12/16/2020 1 38     Monocytes Absolute 12/16/2020 0 42     Eosinophils Absolute 12/16/2020 0 56     Basophils Absolute 12/16/2020 0 08     Magnesium 12/16/2020 1 9     POC Glucose 12/16/2020 247*    POC Glucose 12/16/2020 127     POC Glucose 12/16/2020 184*    POC Glucose 12/16/2020 177*    POC Glucose 12/16/2020 108     WBC 12/17/2020 5 32     RBC 12/17/2020 2 44*    Hemoglobin 12/17/2020 7 4*    Hematocrit 12/17/2020 23 1*    MCV 12/17/2020 95     MCH 12/17/2020 30 3     MCHC 12/17/2020 32 0     RDW 12/17/2020 14 9     MPV 12/17/2020 9 5     Platelets 42/87/5274 255     nRBC 12/17/2020 0     Neutrophils Relative 12/17/2020 56     Immat GRANS % 12/17/2020 0     Lymphocytes Relative 12/17/2020 23     Monocytes Relative 12/17/2020 7     Eosinophils Relative 12/17/2020 13*    Basophils Relative 12/17/2020 1     Neutrophils Absolute 12/17/2020 2 90     Immature Grans Absolute 12/17/2020 0 02     Lymphocytes Absolute 12/17/2020 1 23     Monocytes Absolute 12/17/2020 0 39     Eosinophils Absolute 12/17/2020 0 71*    Basophils Absolute 12/17/2020 0 07     Ferritin 12/17/2020 502*    Iron Saturation 12/17/2020 37     TIBC 12/17/2020 172*    Iron 12/17/2020 63*    POC Glucose 12/17/2020 101     POC Glucose 12/17/2020 103    No results displayed because visit has over 200 results  There may be more visits with results that are not included  Imaging: Ir Tunneled Dialysis Catheter Removal    Result Date: 3/11/2021  Narrative: tunneled dialysis catheter removal  Clinical History: Patient presenting for Perma-Cath removal  The patient has a functional arm access  The existing catheter was prepped and draped in the usual sterile fashion  The catheter was removed with traction  Manual compression was applied to the puncture site and tunnel until hemostasis was achieved  performed by IR staff under direct supervision of Dr Christian Gonzalez The patient tolerated the procedure well and suffered no immediate complications  Impression: Impression: Tunneled dialysis catheter removal Workstation performed: [id number]    Ir Av Fistulagram/graftogram    Result Date: 2/24/2021  Narrative: Arteriovenous fistulogram with angioplasty and coil embolization of a large deep collateral Clinical History: Patient with left upper extremity forearm radiocephalic fistula in place which has been slow to mature and difficult to access the venous needle  Duplex suggested a long segment radial artery stenosis and arterial anastomotic stenosis with significant collateral veins    The collateral veins results in minimal flow through the fistula  Contrast: 52 mL of iodixanol (VISIPAQUE) Fluoro time: 9 3 minutes Number of Images: Multiple Radiation dose: 91 mGy Concious sedation time: 105 minutes Technique: The patient was brought to the interventional radiology suite and identified verbally and by wristband  The patient was placed supine on the table and the existing left upper extremity fistula was prepped and draped in usual sterile fashion  The inner portion of a microcysts puncture set was advanced into the radial artery under direct ultrasound guidance using the usual radial cocktail  A fistulogram was performed of the forearm portion of the fistula  The outflow of the fistula was then occluded to perform a reflux arteriogram of the radial artery and forearm vessels  Findings: The examination demonstrates the patient has underlying atherosclerotic disease with calcification involving his forearm vessels  His radial artery is normal at its origin, but there is a slow taper to a vessel of approximately half the caliber distally  The arterial anastomosis is significantly larger than the size of the inflow artery  There is approximately 8 cm of the fistula present which has multiple areas of weblike and valves present, and in this segment there is a very large deep collateral vein, which was identified under ultrasound  After this initial 10 segment of fistula, there is a bifurcation into a long median antecubital vein as well as the cephalic vein  Intervention: A 5-Swedish slender sheath was advanced into the proximal fistula, and 6 mm angioplasty was performed of the portions of the vein which were affected by the webs and valves  The segments were also not quite 6 mm in diameter  An improved thrill was noted in the fistula, however, with balloon inflations, a very strong thrill was noted as all of the blood was shunted into the deep venous outflow    It became clear that this vein is stealing a significant amount of flow that would otherwise be directed into the superficial veins  A 5-Vincentian Cobra catheter was advanced into the large, deep collateral vein  The vein was then embolized successfully utilizing 3 stainless steel coils  Follow-up fistulogram demonstrated all of the flow from the fistula going through the superficial venous system, and draining entirely through the basilic and cephalic veins in the upper arm  The central veins are widely patent  The thrill in the fistula was significantly improved, and palpable into the more proximal forearm  The inner portion of the microcatheter was removed from the radial artery, and manual compression applied until hemostasis was achieved  Temporary suture closure was performed of the sheath access site  The patient tolerated the procedure well  Impression: Impression: 1  Arterial inflow demonstrates a normal caliber radial artery which tapers to approximately half its cross-sectional volume at the wrist secondary to the patient's long-standing diabetes and atherosclerotic plaque  2   Arterial anastomosis is significantly larger than the size of the radial artery  3   Several webs and valves noted in the proximal fistula, and the fistula also noted not to be quite 6 mm  These areas were treated with 6 mm angioplasty with significant improvement  4   A large collateral into the deep system is noted in the proximal fistula  This was determined to be shunting a significant amount of blood from the superficial system, and so it was embolized with coils as described above  This resulted in significant improvement in the palpable thrill in the fistula which was now detectable into the upper forearm  Workstation performed: [id number]       Review of Systems:  Review of Systems   Constitutional: Negative for diaphoresis, fatigue, fever and unexpected weight change  HENT: Negative  Respiratory: Negative for cough, shortness of breath and wheezing  Cardiovascular: Negative for chest pain, palpitations and leg swelling  Gastrointestinal: Negative for abdominal pain, diarrhea and nausea  Musculoskeletal: Negative for gait problem and myalgias  Skin: Negative for rash  Neurological: Negative for dizziness and numbness  Psychiatric/Behavioral: Negative  Physical Exam:  Physical Exam  Constitutional:       Appearance: He is well-developed  HENT:      Head: Normocephalic and atraumatic  Eyes:      Pupils: Pupils are equal, round, and reactive to light  Neck:      Musculoskeletal: Normal range of motion and neck supple  Vascular: No JVD  Cardiovascular:      Rate and Rhythm: Regular rhythm  Pulses: Normal pulses  Carotid pulses are 2+ on the right side and 2+ on the left side  Heart sounds: S1 normal and S2 normal    Pulmonary:      Effort: Pulmonary effort is normal       Breath sounds: Normal breath sounds  No wheezing or rales  Abdominal:      General: Bowel sounds are normal       Palpations: Abdomen is soft  Tenderness: There is no abdominal tenderness  Musculoskeletal: Normal range of motion  General: No tenderness  Skin:     General: Skin is warm  Neurological:      Mental Status: He is alert and oriented to person, place, and time  Cranial Nerves: No cranial nerve deficit  Deep Tendon Reflexes: Reflexes are normal and symmetric

## 2021-03-29 NOTE — TELEPHONE ENCOUNTER
His mother Deneenie Lennox called to rs, will need a Tuesday or Thursday    Burnie Lennox #  [phone]

## 2021-03-29 NOTE — TELEPHONE ENCOUNTER
Shannon Bella and rescheduled colonoscopy with Dr Isidro Espinoza from 4/29 [facility] to 6/3 [facility]

## 2021-03-31 ENCOUNTER — HOSPITAL ENCOUNTER (INPATIENT)
Facility: HOSPITAL | Age: 36
LOS: 3 days | Discharge: HOME | DRG: 602 | End: 2021-04-04
Attending: EMERGENCY MEDICINE | Admitting: FAMILY MEDICINE
Payer: MEDICARE

## 2021-03-31 ENCOUNTER — APPOINTMENT (EMERGENCY)
Dept: RADIOLOGY | Facility: HOSPITAL | Age: 36
DRG: 602 | End: 2021-03-31
Payer: MEDICARE

## 2021-03-31 DIAGNOSIS — I15.0 RENOVASCULAR HYPERTENSION: ICD-10-CM

## 2021-03-31 DIAGNOSIS — M25.421 SWELLING OF ELBOW JOINT, RIGHT: Primary | ICD-10-CM

## 2021-03-31 DIAGNOSIS — N18.6 ESRD (END STAGE RENAL DISEASE) (HCC): ICD-10-CM

## 2021-03-31 DIAGNOSIS — L03.113 CELLULITIS OF RIGHT UPPER EXTREMITY: ICD-10-CM

## 2021-03-31 DIAGNOSIS — M25.621 DECREASED RANGE OF MOTION OF RIGHT ELBOW: ICD-10-CM

## 2021-03-31 PROBLEM — M70.20 OLECRANON BURSITIS: Status: ACTIVE | Noted: 2021-03-31

## 2021-03-31 LAB
ALBUMIN SERPL BCP-MCNC: 3.8 G/DL (ref 3.5–5)
ALP SERPL-CCNC: 89 U/L (ref 46–116)
ALT SERPL W P-5'-P-CCNC: 14 U/L (ref 12–78)
ANION GAP SERPL CALCULATED.3IONS-SCNC: 12 MMOL/L (ref 4–13)
AST SERPL W P-5'-P-CCNC: 15 U/L (ref 5–45)
BASOPHILS # BLD AUTO: 0.07 THOUSANDS/ΜL (ref 0–0.1)
BASOPHILS NFR BLD AUTO: 1 % (ref 0–1)
BILIRUB SERPL-MCNC: 0.59 MG/DL (ref 0.2–1)
BUN SERPL-MCNC: 22 MG/DL (ref 5–25)
CALCIUM SERPL-MCNC: 8.9 MG/DL (ref 8.3–10.1)
CHLORIDE SERPL-SCNC: 98 MMOL/L (ref 100–108)
CO2 SERPL-SCNC: 30 MMOL/L (ref 21–32)
CREAT SERPL-MCNC: 6.6 MG/DL (ref 0.6–1.3)
CRP SERPL QL: 25 MG/L
EOSINOPHIL # BLD AUTO: 0.7 THOUSAND/ΜL (ref 0–0.61)
EOSINOPHIL NFR BLD AUTO: 11 % (ref 0–6)
ERYTHROCYTE [DISTWIDTH] IN BLOOD BY AUTOMATED COUNT: 15.1 % (ref 11.6–15.1)
ERYTHROCYTE [SEDIMENTATION RATE] IN BLOOD: 10 MM/HOUR (ref 0–14)
GFR SERPL CREATININE-BSD FRML MDRD: 10 ML/MIN/1.73SQ M
GLUCOSE SERPL-MCNC: 243 MG/DL (ref 65–140)
GLUCOSE SERPL-MCNC: 254 MG/DL (ref 65–140)
HCT VFR BLD AUTO: 40.8 % (ref 36.5–49.3)
HGB BLD-MCNC: 12.7 G/DL (ref 12–17)
IMM GRANULOCYTES # BLD AUTO: 0.01 THOUSAND/UL (ref 0–0.2)
IMM GRANULOCYTES NFR BLD AUTO: 0 % (ref 0–2)
LYMPHOCYTES # BLD AUTO: 0.95 THOUSANDS/ΜL (ref 0.6–4.47)
LYMPHOCYTES NFR BLD AUTO: 15 % (ref 14–44)
MCH RBC QN AUTO: 31.2 PG (ref 26.8–34.3)
MCHC RBC AUTO-ENTMCNC: 31.1 G/DL (ref 31.4–37.4)
MCV RBC AUTO: 100 FL (ref 82–98)
MONOCYTES # BLD AUTO: 0.52 THOUSAND/ΜL (ref 0.17–1.22)
MONOCYTES NFR BLD AUTO: 8 % (ref 4–12)
NEUTROPHILS # BLD AUTO: 4.11 THOUSANDS/ΜL (ref 1.85–7.62)
NEUTS SEG NFR BLD AUTO: 65 % (ref 43–75)
NRBC BLD AUTO-RTO: 0 /100 WBCS
PLATELET # BLD AUTO: 265 THOUSANDS/UL (ref 149–390)
PMV BLD AUTO: 9.7 FL (ref 8.9–12.7)
POTASSIUM SERPL-SCNC: 5.3 MMOL/L (ref 3.5–5.3)
PROT SERPL-MCNC: 6.9 G/DL (ref 6.4–8.2)
RBC # BLD AUTO: 4.07 MILLION/UL (ref 3.88–5.62)
SODIUM SERPL-SCNC: 140 MMOL/L (ref 136–145)
WBC # BLD AUTO: 6.36 THOUSAND/UL (ref 4.31–10.16)

## 2021-03-31 PROCEDURE — 82948 REAGENT STRIP/BLOOD GLUCOSE: CPT

## 2021-03-31 PROCEDURE — 86140 C-REACTIVE PROTEIN: CPT | Performed by: PSYCHIATRY & NEUROLOGY

## 2021-03-31 PROCEDURE — 96365 THER/PROPH/DIAG IV INF INIT: CPT

## 2021-03-31 PROCEDURE — 85025 COMPLETE CBC W/AUTO DIFF WBC: CPT | Performed by: PSYCHIATRY & NEUROLOGY

## 2021-03-31 PROCEDURE — 80053 COMPREHEN METABOLIC PANEL: CPT | Performed by: PSYCHIATRY & NEUROLOGY

## 2021-03-31 PROCEDURE — 36415 COLL VENOUS BLD VENIPUNCTURE: CPT | Performed by: PSYCHIATRY & NEUROLOGY

## 2021-03-31 PROCEDURE — 87040 BLOOD CULTURE FOR BACTERIA: CPT | Performed by: PSYCHIATRY & NEUROLOGY

## 2021-03-31 PROCEDURE — 99284 EMERGENCY DEPT VISIT MOD MDM: CPT

## 2021-03-31 PROCEDURE — 99220 PR INITIAL OBSERVATION CARE/DAY 70 MINUTES: CPT | Performed by: PHYSICIAN ASSISTANT

## 2021-03-31 PROCEDURE — 73080 X-RAY EXAM OF ELBOW: CPT

## 2021-03-31 PROCEDURE — 85652 RBC SED RATE AUTOMATED: CPT | Performed by: PHYSICIAN ASSISTANT

## 2021-03-31 PROCEDURE — 96375 TX/PRO/DX INJ NEW DRUG ADDON: CPT

## 2021-03-31 PROCEDURE — 99285 EMERGENCY DEPT VISIT HI MDM: CPT | Performed by: EMERGENCY MEDICINE

## 2021-03-31 RX ORDER — GABAPENTIN 100 MG/1
200 CAPSULE ORAL
Status: DISCONTINUED | OUTPATIENT
Start: 2021-03-31 | End: 2021-04-04 | Stop reason: HOSPADM

## 2021-03-31 RX ORDER — ATORVASTATIN CALCIUM 40 MG/1
40 TABLET, FILM COATED ORAL EVERY EVENING
Status: DISCONTINUED | OUTPATIENT
Start: 2021-03-31 | End: 2021-04-04 | Stop reason: HOSPADM

## 2021-03-31 RX ORDER — METOLAZONE 5 MG/1
10 TABLET ORAL DAILY
Status: DISCONTINUED | OUTPATIENT
Start: 2021-04-01 | End: 2021-04-01

## 2021-03-31 RX ORDER — HYDROMORPHONE HCL/PF 1 MG/ML
0.5 SYRINGE (ML) INJECTION ONCE
Status: COMPLETED | OUTPATIENT
Start: 2021-03-31 | End: 2021-03-31

## 2021-03-31 RX ORDER — NIFEDIPINE 30 MG/1
60 TABLET, EXTENDED RELEASE ORAL 2 TIMES DAILY
Status: DISCONTINUED | OUTPATIENT
Start: 2021-04-01 | End: 2021-04-04 | Stop reason: HOSPADM

## 2021-03-31 RX ORDER — LISINOPRIL 20 MG/1
20 TABLET ORAL 2 TIMES DAILY
Status: DISCONTINUED | OUTPATIENT
Start: 2021-03-31 | End: 2021-04-04 | Stop reason: HOSPADM

## 2021-03-31 RX ORDER — DOXAZOSIN MESYLATE 4 MG/1
8 TABLET ORAL
Status: DISCONTINUED | OUTPATIENT
Start: 2021-03-31 | End: 2021-04-04 | Stop reason: HOSPADM

## 2021-03-31 RX ORDER — SACCHAROMYCES BOULARDII 250 MG
250 CAPSULE ORAL 2 TIMES DAILY
Status: DISCONTINUED | OUTPATIENT
Start: 2021-04-01 | End: 2021-04-04 | Stop reason: HOSPADM

## 2021-03-31 RX ORDER — ESCITALOPRAM OXALATE 10 MG/1
10 TABLET ORAL DAILY
Status: DISCONTINUED | OUTPATIENT
Start: 2021-04-01 | End: 2021-04-04 | Stop reason: HOSPADM

## 2021-03-31 RX ORDER — ASPIRIN 81 MG/1
81 TABLET, CHEWABLE ORAL DAILY
Status: DISCONTINUED | OUTPATIENT
Start: 2021-04-01 | End: 2021-04-04 | Stop reason: HOSPADM

## 2021-03-31 RX ORDER — ACETAMINOPHEN 160 MG/5ML
650 SUSPENSION, ORAL (FINAL DOSE FORM) ORAL EVERY 6 HOURS PRN
Status: DISCONTINUED | OUTPATIENT
Start: 2021-03-31 | End: 2021-04-04 | Stop reason: HOSPADM

## 2021-03-31 RX ORDER — MINOXIDIL 2.5 MG/1
1.25 TABLET ORAL 2 TIMES DAILY
Status: DISCONTINUED | OUTPATIENT
Start: 2021-04-01 | End: 2021-04-01

## 2021-03-31 RX ORDER — FAMOTIDINE 20 MG/1
40 TABLET, FILM COATED ORAL
Status: DISCONTINUED | OUTPATIENT
Start: 2021-03-31 | End: 2021-04-04 | Stop reason: HOSPADM

## 2021-03-31 RX ORDER — TORSEMIDE 100 MG/1
100 TABLET ORAL 2 TIMES DAILY
Status: DISCONTINUED | OUTPATIENT
Start: 2021-03-31 | End: 2021-04-01

## 2021-03-31 RX ORDER — INSULIN GLARGINE 100 [IU]/ML
6 INJECTION, SOLUTION SUBCUTANEOUS
Status: DISCONTINUED | OUTPATIENT
Start: 2021-03-31 | End: 2021-04-04 | Stop reason: HOSPADM

## 2021-03-31 RX ORDER — CINACALCET 30 MG/1
60 TABLET, FILM COATED ORAL DAILY
Status: DISCONTINUED | OUTPATIENT
Start: 2021-04-01 | End: 2021-04-01

## 2021-03-31 RX ORDER — HYDRALAZINE HYDROCHLORIDE 25 MG/1
50 TABLET, FILM COATED ORAL 3 TIMES DAILY
Status: DISCONTINUED | OUTPATIENT
Start: 2021-03-31 | End: 2021-04-04 | Stop reason: HOSPADM

## 2021-03-31 RX ORDER — LABETALOL 100 MG/1
300 TABLET, FILM COATED ORAL 2 TIMES DAILY
Status: DISCONTINUED | OUTPATIENT
Start: 2021-04-01 | End: 2021-04-04

## 2021-03-31 RX ORDER — MELATONIN
1000 DAILY
Status: DISCONTINUED | OUTPATIENT
Start: 2021-04-01 | End: 2021-04-04 | Stop reason: HOSPADM

## 2021-03-31 RX ADMIN — VANCOMYCIN HYDROCHLORIDE 1750 MG: 1 INJECTION, POWDER, LYOPHILIZED, FOR SOLUTION INTRAVENOUS at 19:54

## 2021-03-31 RX ADMIN — CEFTRIAXONE SODIUM 1000 MG: 10 INJECTION, POWDER, FOR SOLUTION INTRAVENOUS at 18:53

## 2021-03-31 RX ADMIN — HYDROMORPHONE HYDROCHLORIDE 0.5 MG: 1 INJECTION, SOLUTION INTRAMUSCULAR; INTRAVENOUS; SUBCUTANEOUS at 18:53

## 2021-03-31 NOTE — ED PROVIDER NOTES
History  Chief Complaint   Patient presents with    Elbow Pain     Pt c/o R sided elbow pain since monday  Denies injury  States does not radiate anywhere  Pt is a 15-year-old male with past medical history of diabetes type 1, several CVAs, and ESKD on dialysis (M,W,F) who presents to the [facility] ED with 3 days of worsening right elbow pain  Pt reports that he noticed on Monday that he woke up with pain in his right elbow, he denies trauma to the area, but does use his right arm to walk with a cane  Pt states his right elbow has become stiff and there is extreme pain with movement  Pt states at its worse the pain is 8 out 10, he has tried taking Tylenol with little relief as well as using CBD oil which also did not help  Pt reports trying to put ice on it this morning with some relief  Pt states that after dialysis his right arm has been cramping  Pt denies, fevers, SOB, chest pain, nausea and vomiting  Prior to Admission Medications   Prescriptions Last Dose Informant Patient Reported? Taking? Admelog SoloStar 100 units/mL injection pen   Yes No   B-D ULTRAFINE III SHORT PEN 31G X 8 MM MISC  Mother No No   Sig: USE 1 PEN NEEDLE 8 TIMES DAILY   GLUCAGON EMERGENCY 1 MG injection  Mother Yes No   Sig: INJECT 1MG SUBCUTANEOUSLY AS NEEDED (AS DIRECTED)   MAY REPEAT DOSE EVERY 20 MINUTES AS NEEDED   Lokelma 5 g PACK   Yes No   NIFEdipine ER (ADALAT CC) 60 MG 24 hr tablet  Mother No No   Sig: Take 1 tablet (60 mg total) by mouth 2 (two) times a day   aspirin 81 mg chewable tablet  Mother Yes No   Sig: Chew 81 mg daily   atorvastatin (LIPITOR) 40 mg tablet  Mother No No   Sig: Take 1 tablet (40 mg total) by mouth every evening   b complex vitamins capsule  Mother Yes No   Sig: Take 1 capsule by mouth daily before lunch    bisacodyl (DULCOLAX) 5 mg EC tablet  Self No No   Sig: Take 2 tablets (10 mg total) by mouth once for 1 dose   bismuth subsalicylate (PEPTO BISMOL) 524 mg/30 mL oral suspension Mother No No   Sig: Take 15 mL (262 mg total) by mouth 2 (two) times a day as needed for diarrhea   calcium acetate (PHOSLO) capsule   Yes No   Sig: TAKE 3 CAPSULES BY MOUTH WITH MEALS   cholecalciferol (VITAMIN D3) 1,000 units tablet  Mother Yes No   Sig: Take 1,000 Units by mouth daily   cinacalcet (SENSIPAR) 60 MG tablet   Yes No   Sig: Take 60 mg by mouth daily Non-dialysis days   cloNIDine (CATAPRES-TTS-3) 0 3 mg/24 hr  Mother Yes No   Sig: clonidine 0 3 mg/24 hr weekly transdermal patch   APPLY 1 PATCH TOPICALLY ONCE A WEEK   doxazosin (CARDURA) 2 mg tablet  Mother No No   Sig: Take 1 tablet (2 mg total) by mouth daily at bedtime   Patient taking differently: Take 2 mg by mouth daily before lunch    doxazosin (CARDURA) 8 MG tablet  Mother Yes No   Sig: Take 8 mg by mouth daily at bedtime   escitalopram (LEXAPRO) 10 mg tablet  Mother No No   Sig: Take 1 tablet (10 mg total) by mouth daily   famotidine (PEPCID) 40 MG tablet  Mother No No   Sig: Take 1 tablet (40 mg total) by mouth 2 (two) times a day   Patient taking differently: Take 40 mg by mouth daily at bedtime as needed    gabapentin (NEURONTIN) 100 mg capsule  Mother No No   Sig: Take 2 tablets at bedtime   hydrALAZINE (APRESOLINE) 100 MG tablet  Mother No No   Sig: Take 0 5 tablets (50 mg total) by mouth 2 (two) times a day   Patient taking differently: Take 50 mg by mouth 3 (three) times a day    insulin glargine (Basaglar KwikPen) 100 units/mL injection pen  Mother No No   Sig: Inject 14 Units under the skin daily at bedtime   Patient taking differently: Inject 6 Units under the skin daily at bedtime    insulin lispro (HumaLOG) 100 units/mL injection  Mother No No   Sig: Inject 4 Units under the skin 3 (three) times a day with meals   labetalol (NORMODYNE) 300 mg tablet  Mother No No   Sig: Take 1 tablet (300 mg total) by mouth every 12 (twelve) hours   Patient taking differently: Take 300 mg by mouth 2 (two) times a day    lisinopril (ZESTRIL) 20 mg tablet  Mother No No   Sig: Take 1 tablet (20 mg total) by mouth daily   Patient taking differently: Take 20 mg by mouth 2 (two) times a day    loperamide (IMODIUM) 2 mg capsule  Mother No No   Sig: Take 1 capsule (2 mg total) by mouth 3 (three) times a day as needed for diarrhea   Patient not taking: Reported on 3/25/2021   metolazone (ZAROXOLYN) 10 mg tablet  Mother Yes No   Sig: Take 10 mg by mouth daily   minoxidil (LONITEN) 2 5 mg tablet  Mother Yes No   Sig: TAKE 1 2 (ONE HALF) TABLET BY MOUTH TWICE DAILY   ondansetron (ZOFRAN) 4 mg tablet  Mother Yes No   Sig: Take 4 mg by mouth every 8 (eight) hours as needed for nausea or vomiting   polyethylene glycol (GOLYTELY) 4000 mL solution   No No   Sig: Take 4,000 mL by mouth once for 1 dose   saccharomyces boulardii (FLORASTOR) 250 mg capsule  Mother No No   Sig: Take 1 capsule (250 mg total) by mouth 2 (two) times a day   sevelamer (RENAGEL) 800 mg tablet  Mother No No   Sig: Take 3 tablets (2,400 mg total) by mouth 3 (three) times a day with meals   sevelamer carbonate (Renvela) 800 mg tablet  Mother Yes No   Sig: 3 (three) times a day with meals    torsemide (DEMADEX) 100 mg tablet  Mother Yes No   Sig: Take 100 mg by mouth 2 (two) times a day       Facility-Administered Medications: None       Past Medical History:   Diagnosis Date    Acute kidney injury ([facility] )     Ambulates with cane     Anuria     Anxiety     Chronic kidney disease     Depression     [facility] mellitus ([email or web address]     Diarrhea     Falls     Gastroparesis     GERD (gastroesophageal reflux disease)     History of shingles 2010    History of transfusion 02/2018    no adverse reaction    Hyperlipidemia     Hyperphosphatemia     Hypertension     Itching     Muscle weakness     general unsteadiness    Retinopathy     Seizures ([facility]     early 2020 - one time    Skin abnormality     some dime size areas where skin was scratched from itching    Stroke ([email or web address] )     x2 - off balance/no driving/fatigue    Swelling of both lower extremities     Vomiting     Wears glasses        Past Surgical History:   Procedure Laterality Date    CARDIAC LOOP RECORDER  05/2018    COLONOSCOPY      EGD      EYE SURGERY Right     IR AV FISTULAGRAM/GRAFTOGRAM  2/23/2021    IR TUNNELED CENTRAL LINE PLACEMENT  2/16/2021    IR TUNNELED DIALYSIS CATHETER PLACEMENT  11/18/2020    IR TUNNELED DIALYSIS CATHETER REMOVAL  2/12/2021    IR TUNNELED DIALYSIS CATHETER REMOVAL  3/11/2021    PERITONEAL CATHETER INSERTION N/A 8/27/2018    Procedure: UNROOF PD CATHETER;  Surgeon: Leah Lai DO;  [facility];  Service: General    SD ANASTOMOSIS,AV,ANY SITE Left 11/9/2020    Procedure: CREATION FISTULA  ARTERIOVENOUS (AV) - LEFT WRIST;  Surgeon: Ernestina Ji MD;  [facility];  Service: Vascular    SD ESOPHAGOGASTRODUODENOSCOPY TRANSORAL DIAGNOSTIC N/A 4/18/2019    Procedure: ESOPHAGOGASTRODUODENOSCOPY (EGD); Surgeon: Jono Bee MD;  Location: [facility];   Service: Gastroenterology    SD LAP INSERTION TUNNELED INTRAPERITONEAL CATHETER N/A 8/6/2018    Procedure: LAPAROSCOPIC PD CATHETER PLACEMENT;  Surgeon: Leah Lai DO;  [facility];  Service: General    SD REMOVAL TUNNELED INTRAPERITONEAL CATHETER N/A 11/18/2020    Procedure: REMOVAL CATHETER PERITONEAL DIALYSIS;  Surgeon: Hari Farooq MD;  [facility];  Service: General    TONSILLECTOMY      UPPER GASTROINTESTINAL ENDOSCOPY         Family History   Problem Relation Age of Onset    Breast cancer Mother     Hypertension Mother     Hyperlipidemia Father     Hypertension Father     Leukemia Maternal Grandmother     Hyperlipidemia Maternal Grandfather     Hypertension Maternal Grandfather     Hyperlipidemia Paternal Grandmother     Hypertension Paternal Grandmother     Heart disease Paternal Grandfather         cardiac disorder    Diabetes Paternal Grandfather      I have reviewed and agree with the history as documented  E-Cigarette/Vaping    E-Cigarette Use Current Every Day User     Comments medical marijuana      E-Cigarette/Vaping Substances    Nicotine No     THC Yes     CBD Yes     Flavoring No     Other No     Unknown No      Social History     Tobacco Use    Smoking status: Former Smoker     Packs/day: 0 50     Years: 12 00     Pack years: 6 00     Types: Cigarettes     Quit date: 02/2018     Years since quitting: 3 1    Smokeless tobacco: Never Used    Tobacco comment: quit 2/2018   Substance Use Topics    Alcohol use: Not Currently    Drug use: Yes     Types: Marijuana     Comment: medical marijuana        Review of Systems   Constitutional: Negative for activity change and fever  Respiratory: Negative for shortness of breath  Cardiovascular: Negative for chest pain and palpitations  Gastrointestinal: Negative for abdominal pain, constipation, diarrhea, nausea and vomiting  Musculoskeletal: Positive for gait problem and joint swelling  Negative for back pain  Neurological: Negative for dizziness and weakness  All other systems reviewed and are negative  Physical Exam  ED Triage Vitals   Temperature Pulse Respirations Blood Pressure SpO2   03/31/21 1524 03/31/21 1524 03/31/21 1524 03/31/21 1524 03/31/21 1524   99 1 °F (37 3 °C) 83 16 159/91 98 %      Temp Source Heart Rate Source Patient Position - Orthostatic VS BP Location FiO2 (%)   03/31/21 1524 03/31/21 1524 03/31/21 1524 03/31/21 1524 --   Oral Monitor Sitting Right arm       Pain Score       03/31/21 1853       6             Orthostatic Vital Signs  Vitals:    03/31/21 1524   BP: 159/91   Pulse: 83   Patient Position - Orthostatic VS: Sitting       Physical Exam  Vitals signs and nursing note reviewed  Constitutional:       Appearance: He is well-developed  HENT:      Head: Normocephalic and atraumatic  Eyes:      General: No scleral icterus  Right eye: No discharge  Left eye: No discharge  Conjunctiva/sclera: Conjunctivae normal    Neck:      Musculoskeletal: Neck supple  Cardiovascular:      Rate and Rhythm: Normal rate and regular rhythm  Heart sounds: No murmur  Pulmonary:      Effort: Pulmonary effort is normal  No respiratory distress  Breath sounds: Normal breath sounds  Chest:      Chest wall: No tenderness  Abdominal:      Palpations: Abdomen is soft  Tenderness: There is no abdominal tenderness  Musculoskeletal:      Right elbow: He exhibits swelling  He exhibits normal range of motion  Tenderness found  Olecranon process tenderness noted  Left elbow: Normal         Arms:       Comments: Decreased ROM in elbow flexion and extension, extreme pain with all elbow movements  Strength testing limited by pain   Skin:     General: Skin is warm and dry  Neurological:      Mental Status: He is alert  Sensory: Sensation is intact           ED Medications  Medications   vancomycin (VANCOCIN) 1,750 mg in sodium chloride 0 9 % 500 mL IVPB (1,750 mg Intravenous New Bag 3/31/21 1954)   ceftriaxone (ROCEPHIN) 1 g/50 mL in dextrose IVPB (0 mg Intravenous Stopped 3/31/21 1923)   HYDROmorphone (DILAUDID) injection 0 5 mg (0 5 mg Intravenous Given 3/31/21 1853)       Diagnostic Studies  Results Reviewed     Procedure Component Value Units Date/Time    C-reactive protein [[id number]]  (Abnormal) Collected: 03/31/21 1651    Lab Status: Final result Specimen: Blood from Arm, Right Updated: 03/31/21 1801     CRP 25 0 mg/L     Comprehensive metabolic panel [[id number]]  (Abnormal) Collected: 03/31/21 1651    Lab Status: Final result Specimen: Blood from Arm, Right Updated: 03/31/21 1731     Sodium 140 mmol/L      Potassium 5 3 mmol/L      Chloride 98 mmol/L      CO2 30 mmol/L      ANION GAP 12 mmol/L      BUN 22 mg/dL      Creatinine 6 60 mg/dL      Glucose 243 mg/dL      Calcium 8 9 mg/dL      AST 15 U/L      ALT 14 U/L      Alkaline Phosphatase 89 U/L      Total Protein 6 9 g/dL Albumin 3 8 g/dL      Total Bilirubin 0 59 mg/dL      eGFR 10 ml/min/1 73sq m     Narrative:      [place] guidelines for Chronic Kidney Disease (CKD):     Stage 1 with normal or high GFR (GFR > 90 mL/min/1 73 square meters)    Stage 2 Mild CKD (GFR = 60-89 mL/min/1 73 square meters)    Stage 3A Moderate CKD (GFR = 45-59 mL/min/1 73 square meters)    Stage 3B Moderate CKD (GFR = 30-44 mL/min/1 73 square meters)    Stage 4 Severe CKD (GFR = 15-29 mL/min/1 73 square meters)    Stage 5 End Stage CKD (GFR <15 mL/min/1 73 square meters)  Note: GFR calculation is accurate only with a steady state creatinine    CBC and differential [[id number]]  (Abnormal) Collected: 03/31/21 1651    Lab Status: Final result Specimen: Blood from Arm, Right Updated: 03/31/21 1721     WBC 6 36 Thousand/uL      RBC 4 07 Million/uL      Hemoglobin 12 7 g/dL      Hematocrit 40 8 %       fL      MCH 31 2 pg      MCHC 31 1 g/dL      RDW 15 1 %      MPV 9 7 fL      Platelets 260 Thousands/uL      nRBC 0 /100 WBCs      Neutrophils Relative 65 %      Immat GRANS % 0 %      Lymphocytes Relative 15 %      Monocytes Relative 8 %      Eosinophils Relative 11 %      Basophils Relative 1 %      Neutrophils Absolute 4 11 Thousands/µL      Immature Grans Absolute 0 01 Thousand/uL      Lymphocytes Absolute 0 95 Thousands/µL      Monocytes Absolute 0 52 Thousand/µL      Eosinophils Absolute 0 70 Thousand/µL      Basophils Absolute 0 07 Thousands/µL     Blood culture #1 [[id number]] Collected: 03/31/21 1654    Lab Status: In process Specimen: Blood from Hand, Right Updated: 03/31/21 1659    Blood culture #2 [[id number]] Collected: 03/31/21 1651    Lab Status: In process Specimen: Blood from Arm, Right Updated: 03/31/21 1659                 XR elbow 3+ vw RIGHT   Final Result by Radha Mejia DO (03/31 1654)      No acute osseous abnormality  Minor soft tissue swelling overlying the olecranon              Workstation performed: [id number]               Procedures  Procedures      ED Course  ED Course as of Mar 31 2027   Wed Mar 31, 2021   1656 CRP, CMP, CBC, and blood cultures were ordered      1656 R elbow xray was ordered       1656 No acute osseous abnormality  Minor soft tissue swelling overlying the olecranon  Small posterior osteophyte of the proximal olecranon seen on the lateral view  XR elbow 3+ vw RIGHT   1729 Unremarkable    CBC and differential(!)   1815 Mild hypochloremia   Comprehensive metabolic panel(!)   5719 CRP elevated - 25   C-reactive protein(!)   1845 Pt was given a dose of Vancomycin and Ceftriaxone    Pt was also given dose of dilaudid with effective relief in pain      1900 Orthopedic surgery was contacted through [employer] [facility] and the case was discussed, they are in agreement to admit pt to [facility] service for observation and they report that they will see the pt tomorrow morning      1901 Pt was admitted to [facility] service under Dr Griselda Freud for observation                                          MDM    Disposition  Final diagnoses:   Swelling of elbow joint, right   Decreased range of motion of right elbow     Time reflects when diagnosis was documented in both MDM as applicable and the Disposition within this note     Time User Action Codes Description Comment    3/31/2021  7:01 PM Fredo Bulla Add [M25 421] Swelling of elbow joint, right     3/31/2021  7:01 PM Martin Bulla Add [M25 [id number]] Decreased ROM of elbow     3/31/2021  7:01 PM Fredo Bulla Remove [[id number]] Decreased ROM of elbow     3/31/2021  7:02 PM Martin Bulla Add [N97 [id number]] Decreased range of motion of right elbow       ED Disposition     ED Disposition Condition Date/Time Comment    Admit Stable Wed Mar 31, 2021  7:00 PM Case was discussed with Dr Griselda Freud and the patient's admission status was agreed to be Admission Status: observation status to the service of Dr Griselda Freud           Follow-up Information    None         Patient's Medications Discharge Prescriptions    No medications on file     No discharge procedures on file  PDMP Review       Value Time User    PDMP Reviewed  Yes 11/11/2020 10:46 AM Patricia Winters PA-C           ED Provider  Attending physically available and evaluated Kallie Roberto  JASPREET managed the patient along with the ED Attending      Electronically Signed by         Rajesh Long DO  03/31/21 2027

## 2021-04-01 PROBLEM — M25.521 ELBOW PAIN, RIGHT: Status: ACTIVE | Noted: 2021-03-31

## 2021-04-01 LAB
ANION GAP SERPL CALCULATED.3IONS-SCNC: 20 MMOL/L (ref 4–13)
BASOPHILS # BLD AUTO: 0.08 THOUSANDS/ΜL (ref 0–0.1)
BASOPHILS NFR BLD AUTO: 1 % (ref 0–1)
BUN SERPL-MCNC: 32 MG/DL (ref 5–25)
CALCIUM SERPL-MCNC: 9 MG/DL (ref 8.3–10.1)
CHLORIDE SERPL-SCNC: 103 MMOL/L (ref 100–108)
CO2 SERPL-SCNC: 19 MMOL/L (ref 21–32)
CREAT SERPL-MCNC: 8.32 MG/DL (ref 0.6–1.3)
EOSINOPHIL # BLD AUTO: 0.78 THOUSAND/ΜL (ref 0–0.61)
EOSINOPHIL NFR BLD AUTO: 10 % (ref 0–6)
ERYTHROCYTE [DISTWIDTH] IN BLOOD BY AUTOMATED COUNT: 15 % (ref 11.6–15.1)
GFR SERPL CREATININE-BSD FRML MDRD: 7 ML/MIN/1.73SQ M
GLUCOSE P FAST SERPL-MCNC: 240 MG/DL (ref 65–99)
GLUCOSE SERPL-MCNC: 102 MG/DL (ref 65–140)
GLUCOSE SERPL-MCNC: 150 MG/DL (ref 65–140)
GLUCOSE SERPL-MCNC: 155 MG/DL (ref 65–140)
GLUCOSE SERPL-MCNC: 196 MG/DL (ref 65–140)
GLUCOSE SERPL-MCNC: 240 MG/DL (ref 65–140)
HCT VFR BLD AUTO: 39.8 % (ref 36.5–49.3)
HGB BLD-MCNC: 12.7 G/DL (ref 12–17)
IMM GRANULOCYTES # BLD AUTO: 0.02 THOUSAND/UL (ref 0–0.2)
IMM GRANULOCYTES NFR BLD AUTO: 0 % (ref 0–2)
LYMPHOCYTES # BLD AUTO: 1.32 THOUSANDS/ΜL (ref 0.6–4.47)
LYMPHOCYTES NFR BLD AUTO: 17 % (ref 14–44)
MCH RBC QN AUTO: 31.6 PG (ref 26.8–34.3)
MCHC RBC AUTO-ENTMCNC: 31.9 G/DL (ref 31.4–37.4)
MCV RBC AUTO: 99 FL (ref 82–98)
MONOCYTES # BLD AUTO: 0.62 THOUSAND/ΜL (ref 0.17–1.22)
MONOCYTES NFR BLD AUTO: 8 % (ref 4–12)
NEUTROPHILS # BLD AUTO: 5.05 THOUSANDS/ΜL (ref 1.85–7.62)
NEUTS SEG NFR BLD AUTO: 64 % (ref 43–75)
NRBC BLD AUTO-RTO: 0 /100 WBCS
PLATELET # BLD AUTO: 245 THOUSANDS/UL (ref 149–390)
PMV BLD AUTO: 9.6 FL (ref 8.9–12.7)
POTASSIUM SERPL-SCNC: 4.4 MMOL/L (ref 3.5–5.3)
PROCALCITONIN SERPL-MCNC: 2.74 NG/ML
RBC # BLD AUTO: 4.02 MILLION/UL (ref 3.88–5.62)
SODIUM SERPL-SCNC: 142 MMOL/L (ref 136–145)
WBC # BLD AUTO: 7.87 THOUSAND/UL (ref 4.31–10.16)

## 2021-04-01 PROCEDURE — 82948 REAGENT STRIP/BLOOD GLUCOSE: CPT

## 2021-04-01 PROCEDURE — 99232 SBSQ HOSP IP/OBS MODERATE 35: CPT | Performed by: FAMILY MEDICINE

## 2021-04-01 PROCEDURE — 99222 1ST HOSP IP/OBS MODERATE 55: CPT | Performed by: INTERNAL MEDICINE

## 2021-04-01 PROCEDURE — 99222 1ST HOSP IP/OBS MODERATE 55: CPT | Performed by: PHYSICIAN ASSISTANT

## 2021-04-01 PROCEDURE — 85025 COMPLETE CBC W/AUTO DIFF WBC: CPT | Performed by: PHYSICIAN ASSISTANT

## 2021-04-01 PROCEDURE — 80048 BASIC METABOLIC PNL TOTAL CA: CPT | Performed by: PHYSICIAN ASSISTANT

## 2021-04-01 PROCEDURE — 84145 PROCALCITONIN (PCT): CPT | Performed by: PHYSICIAN ASSISTANT

## 2021-04-01 RX ORDER — CINACALCET 30 MG/1
60 TABLET, FILM COATED ORAL EVERY OTHER DAY
Status: DISCONTINUED | OUTPATIENT
Start: 2021-04-02 | End: 2021-04-04 | Stop reason: HOSPADM

## 2021-04-01 RX ORDER — ONDANSETRON 4 MG/1
4 TABLET, ORALLY DISINTEGRATING ORAL EVERY 6 HOURS PRN
Status: DISCONTINUED | OUTPATIENT
Start: 2021-04-01 | End: 2021-04-04 | Stop reason: HOSPADM

## 2021-04-01 RX ORDER — DOXAZOSIN MESYLATE 1 MG/1
2 TABLET ORAL DAILY
Status: DISCONTINUED | OUTPATIENT
Start: 2021-04-02 | End: 2021-04-01

## 2021-04-01 RX ORDER — LABETALOL 20 MG/4 ML (5 MG/ML) INTRAVENOUS SYRINGE
10 EVERY 6 HOURS PRN
Status: DISCONTINUED | OUTPATIENT
Start: 2021-04-01 | End: 2021-04-04 | Stop reason: HOSPADM

## 2021-04-01 RX ORDER — TORSEMIDE 100 MG/1
100 TABLET ORAL
Status: DISCONTINUED | OUTPATIENT
Start: 2021-04-01 | End: 2021-04-04 | Stop reason: HOSPADM

## 2021-04-01 RX ORDER — METOLAZONE 5 MG/1
10 TABLET ORAL
Status: DISCONTINUED | OUTPATIENT
Start: 2021-04-02 | End: 2021-04-04 | Stop reason: HOSPADM

## 2021-04-01 RX ORDER — CLONIDINE 0.3 MG/24H
0.3 PATCH, EXTENDED RELEASE TRANSDERMAL WEEKLY
Status: DISCONTINUED | OUTPATIENT
Start: 2021-04-01 | End: 2021-04-04 | Stop reason: HOSPADM

## 2021-04-01 RX ORDER — MINOXIDIL 2.5 MG/1
1.25 TABLET ORAL
Status: DISCONTINUED | OUTPATIENT
Start: 2021-04-02 | End: 2021-04-04 | Stop reason: HOSPADM

## 2021-04-01 RX ORDER — DOXAZOSIN 2 MG/1
2 TABLET ORAL
COMMUNITY
End: 2021-05-24

## 2021-04-01 RX ORDER — DOXAZOSIN MESYLATE 1 MG/1
2 TABLET ORAL DAILY
Status: DISCONTINUED | OUTPATIENT
Start: 2021-04-01 | End: 2021-04-04 | Stop reason: HOSPADM

## 2021-04-01 RX ORDER — MINOXIDIL 2.5 MG/1
10 TABLET ORAL DAILY
Status: DISCONTINUED | OUTPATIENT
Start: 2021-04-01 | End: 2021-04-04 | Stop reason: HOSPADM

## 2021-04-01 RX ADMIN — NIFEDIPINE 60 MG: 30 TABLET, FILM COATED, EXTENDED RELEASE ORAL at 08:15

## 2021-04-01 RX ADMIN — ZINC 1 TABLET: TAB ORAL at 12:08

## 2021-04-01 RX ADMIN — TORSEMIDE 100 MG: 100 TABLET ORAL at 08:15

## 2021-04-01 RX ADMIN — HYDRALAZINE HYDROCHLORIDE 50 MG: 25 TABLET, FILM COATED ORAL at 21:08

## 2021-04-01 RX ADMIN — DOXAZOSIN 8 MG: 4 TABLET ORAL at 21:07

## 2021-04-01 RX ADMIN — ACETAMINOPHEN 650 MG: 650 SUSPENSION ORAL at 00:05

## 2021-04-01 RX ADMIN — ESCITALOPRAM OXALATE 10 MG: 10 TABLET ORAL at 08:14

## 2021-04-01 RX ADMIN — ACETAMINOPHEN 649.6 MG: 650 SUSPENSION ORAL at 23:26

## 2021-04-01 RX ADMIN — METOLAZONE 10 MG: 5 TABLET ORAL at 08:14

## 2021-04-01 RX ADMIN — INSULIN LISPRO 1 UNITS: 100 INJECTION, SOLUTION INTRAVENOUS; SUBCUTANEOUS at 08:21

## 2021-04-01 RX ADMIN — INSULIN LISPRO 1 UNITS: 100 INJECTION, SOLUTION INTRAVENOUS; SUBCUTANEOUS at 21:10

## 2021-04-01 RX ADMIN — INSULIN LISPRO 4 UNITS: 100 INJECTION, SOLUTION INTRAVENOUS; SUBCUTANEOUS at 12:09

## 2021-04-01 RX ADMIN — INSULIN LISPRO 1 UNITS: 100 INJECTION, SOLUTION INTRAVENOUS; SUBCUTANEOUS at 18:03

## 2021-04-01 RX ADMIN — GABAPENTIN 200 MG: 100 CAPSULE ORAL at 21:08

## 2021-04-01 RX ADMIN — CINACALCET 60 MG: 30 TABLET, FILM COATED ORAL at 08:15

## 2021-04-01 RX ADMIN — HYDRALAZINE HYDROCHLORIDE 50 MG: 25 TABLET, FILM COATED ORAL at 08:15

## 2021-04-01 RX ADMIN — LISINOPRIL 20 MG: 20 TABLET ORAL at 21:07

## 2021-04-01 RX ADMIN — GABAPENTIN 200 MG: 100 CAPSULE ORAL at 00:06

## 2021-04-01 RX ADMIN — MINOXIDIL 10 MG: 2.5 TABLET ORAL at 12:17

## 2021-04-01 RX ADMIN — ACETAMINOPHEN 649.6 MG: 650 SUSPENSION ORAL at 16:36

## 2021-04-01 RX ADMIN — LABETALOL HYDROCHLORIDE 300 MG: 100 TABLET ORAL at 17:59

## 2021-04-01 RX ADMIN — TORSEMIDE 100 MG: 100 TABLET ORAL at 16:38

## 2021-04-01 RX ADMIN — HYDRALAZINE HYDROCHLORIDE 50 MG: 25 TABLET, FILM COATED ORAL at 16:00

## 2021-04-01 RX ADMIN — DOXAZOSIN 2 MG: 1 TABLET ORAL at 16:38

## 2021-04-01 RX ADMIN — INSULIN LISPRO 4 UNITS: 100 INJECTION, SOLUTION INTRAVENOUS; SUBCUTANEOUS at 08:21

## 2021-04-01 RX ADMIN — Medication 250 MG: at 08:14

## 2021-04-01 RX ADMIN — CEFAZOLIN 500 MG: 1 INJECTION, POWDER, FOR SOLUTION INTRAMUSCULAR; INTRAVENOUS at 12:10

## 2021-04-01 RX ADMIN — NIFEDIPINE 60 MG: 30 TABLET, FILM COATED, EXTENDED RELEASE ORAL at 17:55

## 2021-04-01 RX ADMIN — INSULIN LISPRO 4 UNITS: 100 INJECTION, SOLUTION INTRAVENOUS; SUBCUTANEOUS at 18:04

## 2021-04-01 RX ADMIN — Medication 1000 UNITS: at 08:14

## 2021-04-01 RX ADMIN — ATORVASTATIN CALCIUM 40 MG: 40 TABLET, FILM COATED ORAL at 17:55

## 2021-04-01 RX ADMIN — HYDRALAZINE HYDROCHLORIDE 50 MG: 25 TABLET, FILM COATED ORAL at 00:06

## 2021-04-01 RX ADMIN — INSULIN GLARGINE 6 UNITS: 100 INJECTION, SOLUTION SUBCUTANEOUS at 21:10

## 2021-04-01 RX ADMIN — ACETAMINOPHEN 650 MG: 650 SUSPENSION ORAL at 08:13

## 2021-04-01 RX ADMIN — Medication 250 MG: at 17:58

## 2021-04-01 RX ADMIN — ASPIRIN 81 MG CHEWABLE TABLET 81 MG: 81 TABLET CHEWABLE at 08:15

## 2021-04-01 RX ADMIN — LABETALOL HYDROCHLORIDE 300 MG: 100 TABLET ORAL at 08:14

## 2021-04-01 RX ADMIN — LISINOPRIL 20 MG: 20 TABLET ORAL at 08:15

## 2021-04-01 NOTE — ASSESSMENT & PLAN NOTE
2/2 cellulitis vs less likely bursitis vs unlikely septic arthritis   POA: Worsening swelling and pain in right elbow x4d  +limited ROM  · Patient states that he does ambulate with a cane with his right arm has been doing so for approximately 3 years, and does state that he newly started hemodialysis a couple of months ago and he does rest his right olecranon on the hemodialysis chair  · Was started on ceftriaxone and vancomycin in the emergency department possible septic bursitis  · CRP mildly elevated     Plan:   · Ortho consulted - no effusion, unlikely septic arthritis, more likely cellulitis  Recommend Abx  · Continue IV keflex 500mg (3x weekly per pharmacy)  Monitor temps, wbc     · Ice and elevation

## 2021-04-01 NOTE — ASSESSMENT & PLAN NOTE
· Patient with worsening swelling and pain in the left olecarnon since Monday  He has never had this pain before and came in when he noticed limited range of motion  · Patient states that he does ambulate with a cane with his right arm has been doing so for approximately 3 years, and does state that he newly started hemodialysis a couple of months ago and he does rest his right olecranon on the hemodialysis chair  · Was started on ceftriaxone and vancomycin in the emergency department possible septic bursitis  · CRP is mildly elevated, will check ESR  · No white count, or fevers, low suspicion for septic joint, or bursitis    · Differentials include gout verses bursitis  · Consult orthopedics  · Ice and elevation

## 2021-04-01 NOTE — ASSESSMENT & PLAN NOTE
Lab Results   Component Value Date    HGBA1C 6 9 (H) 11/13/2020       No results for input(s): POCGLU in the last 72 hours      Blood Sugar Average: Last 72 hrs:    · Continue home insulin regimen   · lantus 6 units HS   · humalog 4 units TID   · Continue this w/ sliding scale   · accuchecks

## 2021-04-01 NOTE — CONSULTS
[id number] Mckeon 38 y o  male MRN: [id number]  Unit/Bed#: W -01 Encounter: [id number]    ASSESSMENT and PLAN:  1  ESRD on HD Alphonso Shukla, Monday Wednesday and Friday):   · Plan for next treatment tomorrow  2  Access:  Left arm AV fistula  Good bruit and thrill  3  Hypertension:    · Blood pressure difficult to manage, chronically elevated  · Continue home blood pressure medications:  Cardura, clonidine patch, hydralazine, labetalol, lisinopril, nifedipine, torsemide, minoxidil  Medications reviewed with patient and adjusted per outpatient prescriptions  4  Anemia:    · Hemoglobin above goal  · On Epogen 5600 units  3 times per week  5  Mineral and bone disease:    · Continue outpatient medications:  PhosLo,  Sensipar 60 mg every other day with evening meal, vitamin D2, vitamin D3  6  Electrolytes: On lokelma 5 g on non dialysis days  Not available as inpatient  Patient's family member will bring in medication  7  Olecranon bursitis:  Right elbow pain, erythema, warmth without any evidence of prior trauma  Patient unaware of any previous injury  8  Other:  Diabetes mellitus type 1 per primary team, history of CVA    SUMMARY OF RECOMMENDATIONS:   Hemodialysis tomorrow    HISTORY OF PRESENT ILLNESS:  Requesting Physician: Macario Issa MD  Reason for Consult: ESRD on HD    Nisreen Pham is a 38 y o  male a past medical history of diabetes mellitus type 1, end-stage renal disease, CVA, 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 and also heterozygote for prothrombin gene mutation who was admitted to [facility]  after presenting with right elbow pain, erythema, edema and warmth  He denies fevers or chills  Patient states had no prior injury to the joint but uses that arm primarily for ambulation with a cane  His dialysis access is in the left arm  A renal consultation is requested today for assistance in the management of ESRD  Pedrito Durham is a known ESRD patient who undergoes maintenance hemodialysis at [email or web address] on Monday, Wednesday, Friday  Last treatment was on the day of admission prior to ER visit  PAST MEDICAL HISTORY:  Past Medical History:   Diagnosis Date    [facility]     Ambulates with cane     Anuria     Anxiety     Chronic kidney disease     Depression     Diabetes mellitus ([facility] [facility]     Diarrhea     Falls     Gastroparesis     GERD (gastroesophageal reflux disease)     History of shingles 2010    History of transfusion 02/2018    no adverse reaction    Hyperlipidemia     Hyperphosphatemia     Hypertension     Itching     Muscle weakness     general unsteadiness    Retinopathy     Seizures ([facility] [address] )     early 2020 - one time    Skin abnormality     some dime size areas where skin was scratched from itching    [facility]     x2 - off balance/no driving/fatigue    Swelling of both lower extremities     Vomiting     Wears glasses        PAST SURGICAL HISTORY:  Past Surgical History:   Procedure Laterality Date    CARDIAC LOOP RECORDER  05/2018    COLONOSCOPY      EGD      EYE SURGERY Right     IR AV FISTULAGRAM/GRAFTOGRAM  2/23/2021    IR TUNNELED CENTRAL LINE PLACEMENT  2/16/2021    IR TUNNELED DIALYSIS CATHETER PLACEMENT  11/18/2020    IR TUNNELED DIALYSIS CATHETER REMOVAL  2/12/2021    IR TUNNELED DIALYSIS CATHETER REMOVAL  3/11/2021    PERITONEAL CATHETER INSERTION N/A 8/27/2018    Procedure: UNROOF PD CATHETER;  Surgeon: Nora Boyle DO;  [facility];  Service: General    VA ANASTOMOSIS,AV,ANY SITE Left 11/9/2020    Procedure: CREATION FISTULA  ARTERIOVENOUS (AV) - LEFT WRIST;  Surgeon: Arlene Geronimo MD;  [facility];  Service: Vascular    VA ESOPHAGOGASTRODUODENOSCOPY TRANSORAL DIAGNOSTIC N/A 4/18/2019    Procedure: ESOPHAGOGASTRODUODENOSCOPY (EGD); Surgeon: Cassi Alcantara MD;  [facility];   Service: Gastroenterology    VA LAP INSERTION TUNNELED INTRAPERITONEAL CATHETER N/A 8/6/2018    Procedure: LAPAROSCOPIC PD CATHETER PLACEMENT;  Surgeon: Blair Quiroz DO;  [facility];  Service: General    PA REMOVAL TUNNELED INTRAPERITONEAL CATHETER N/A 11/18/2020    Procedure: REMOVAL CATHETER PERITONEAL DIALYSIS;  Surgeon: Lesa Lehman MD;  [facility];  Service: General    TONSILLECTOMY      UPPER GASTROINTESTINAL ENDOSCOPY         ALLERGIES:  Allergies   Allergen Reactions    Sulfa Antibiotics Rash       SOCIAL HISTORY:  Social History     Substance and Sexual Activity   Alcohol Use Not Currently     Social History     Substance and Sexual Activity   Drug Use Yes    Types: Marijuana    Comment: medical marijuana     Social History     Tobacco Use   Smoking Status Former Smoker    Packs/day: 0 50    Years: 12 00    Pack years: 6 00    Types: Cigarettes    Quit date: 02/2018    Years since quitting: 3 1   Smokeless Tobacco Never Used   Tobacco Comment    quit 2/2018       FAMILY HISTORY:  Family History   Problem Relation Age of Onset    Breast cancer Mother     Hypertension Mother     Hyperlipidemia Father     Hypertension Father     Leukemia Maternal Grandmother     Hyperlipidemia Maternal Grandfather     Hypertension Maternal Grandfather     Hyperlipidemia Paternal Grandmother     Hypertension Paternal Grandmother     Heart disease Paternal Grandfather         cardiac disorder    Diabetes Paternal Grandfather        MEDICATIONS:    Current Facility-Administered Medications:     acetaminophen (TYLENOL) oral suspension 650 mg, 650 mg, Oral, Q6H PRN, Sudarshan Womack PA-C, 650 mg at 04/01/21 0813    aspirin chewable tablet 81 mg, 81 mg, Oral, Daily, Sudarshan Womack PA-C, 81 mg at 04/01/21 0815    atorvastatin (LIPITOR) tablet 40 mg, 40 mg, Oral, QPM, Sudarshan Womack PA-C    cholecalciferol (VITAMIN D3) tablet 1,000 Units, 1,000 Units, Oral, Daily, Sudarshan Womack PA-C, 1,000 Units at 04/01/21 7703   cinacalcet (SENSIPAR) tablet 60 mg, 60 mg, Oral, Daily, Sudarshan Womack PA-C, 60 mg at 04/01/21 0815    doxazosin (CARDURA) tablet 8 mg, 8 mg, Oral, HS, Sudarshan Womack PA-C    escitalopram (LEXAPRO) tablet 10 mg, 10 mg, Oral, Daily, Sudarshan Womack PA-C, 10 mg at 04/01/21 5158    famotidine (PEPCID) tablet 40 mg, 40 mg, Oral, HS PRN, Mariana Harding PA-C    gabapentin (NEURONTIN) capsule 200 mg, 200 mg, Oral, HS, Sudarshan Womack PA-C, 200 mg at 04/01/21 0006    hydrALAZINE (APRESOLINE) tablet 50 mg, 50 mg, Oral, TID, Sudarshan Womack PA-C, 50 mg at 04/01/21 0815    insulin glargine (LANTUS) subcutaneous injection 6 Units 0 06 mL, 6 Units, Subcutaneous, HS, Sudarshan Womack PA-C    insulin lispro (HumaLOG) 100 units/mL subcutaneous injection 1-5 Units, 1-5 Units, Subcutaneous, HS, Sudarshan Womack PA-C    insulin lispro (HumaLOG) 100 units/mL subcutaneous injection 1-6 Units, 1-6 Units, Subcutaneous, TID AC, 1 Units at 04/01/21 0821 **AND** Fingerstick Glucose (POCT), , , TID AC, Sudarshan Womack PA-C    insulin lispro (HumaLOG) 100 units/mL subcutaneous injection 4 Units, 4 Units, Subcutaneous, TID With Meals, Mariana Harding PA-C, 4 Units at 04/01/21 8085    labetalol (NORMODYNE) tablet 300 mg, 300 mg, Oral, BID, Sudarshan Womack PA-C, 300 mg at 04/01/21 0614    lisinopril (ZESTRIL) tablet 20 mg, 20 mg, Oral, BID, Sudarshan Womack PA-C, 20 mg at 04/01/21 0815    metolazone (ZAROXOLYN) tablet 10 mg, 10 mg, Oral, Daily, Sudarshan Womack PA-C, 10 mg at 04/01/21 5062    minoxidil (LONITEN) tablet 1 25 mg, 1 25 mg, Oral, BID, Sudarshan Womack PA-C    multivitamin stress formula tablet 1 tablet, 1 tablet, Oral, Daily Before Lunch, Sudarshan Womack PA-C    NIFEdipine (PROCARDIA XL) 24 hr tablet 60 mg, 60 mg, Oral, BID, AURELIANO Valdez-CLOTILDE, 60 mg at 04/01/21 0815    saccharomyces boulardii (FLORASTOR) capsule 250 mg, 250 mg, Oral, BID, AURELIANO Valdez-CLOTILDE, 250 mg at 04/01/21 0814    torsemide (DEMADEX) tablet 100 mg, 100 mg, Oral, BID, Sudarshan Womack PA-C, 100 mg at 04/01/21 5175    REVIEW OF SYSTEMS:  Constitutional:  Denies fevers or chills   HENT: Negative for postnasal drip  Eyes:  Positive for chronic v isual disturbance  Respiratory: Negative for cough, shortness of breath and wheezing  Cardiovascular: Negative for chest pain, palpitations  Gastrointestinal: Negative for abdominal pain, constipation, diarrhea, nausea and vomiting  Genitourinary: No dysuria, hematuria   Musculoskeletal:  Positive for right elbow pain, heat, redness, limitation to activities of daily living and range of motion  Skin: Negative for rash  Neurological: Negative for focal weakness  Hematological: Negative for bleeding  Psychiatric/Behavioral: Negative for confusion   All the systems were reviewed and were negative except as documented on the HPI  PHYSICAL EXAM:  Current Weight: Weight - Scale: 96 4 kg (212 lb 8 4 oz)  First Weight: Weight - Scale: 96 4 kg (212 lb 8 4 oz)  Vitals:    03/31/21 2345 03/31/21 2352 04/01/21 0102 04/01/21 0645   BP: (!) 209/106 (!) 210/110 163/89 157/78   BP Location: Right arm Right arm     Pulse: 77  82 79   Resp: 16  16 18   Temp: 98 7 °F (37 1 °C)   98 9 °F (37 2 °C)   TempSrc: Oral      SpO2: 95%  97% 96%   Weight: 96 4 kg (212 lb 8 4 oz)      Height: 5' 11" (1 803 m)          Intake/Output Summary (Last 24 hours) at 4/1/2021 0916  Last data filed at 4/1/2021 0600  Gross per 24 hour   Intake 790 ml   Output 450 ml   Net 340 ml     Physical Exam  Constitutional:       General: He is not in acute distress  Appearance: He is well-developed  He is ill-appearing (Chronically ill-appearing)  He is not toxic-appearing  HENT:      Head: Normocephalic and atraumatic  Nose: No congestion  Eyes:      General: No scleral icterus  Extraocular Movements: Extraocular movements intact        Conjunctiva/sclera: Conjunctivae normal    Neck:      Musculoskeletal: Normal range of motion and neck supple  Thyroid: No thyromegaly  Vascular: No JVD  Trachea: No tracheal deviation  Cardiovascular:      Rate and Rhythm: Normal rate and regular rhythm  Heart sounds: No murmur  No friction rub  No gallop  Pulmonary:      Effort: Pulmonary effort is normal  No respiratory distress  Breath sounds: Normal breath sounds  No stridor  No wheezing, rhonchi or rales  Abdominal:      General: Bowel sounds are normal  There is no distension  Palpations: Abdomen is soft  Tenderness: There is no abdominal tenderness  Musculoskeletal: Normal range of motion  General: Swelling ( right elbow) present  Right lower leg: Edema present  Left lower leg: Edema present  Comments: Mild lower extremity edema   Skin:     General: Skin is warm and dry  Coloration: Skin is not jaundiced or pale  Findings: Erythema ( right elbow) present  No rash  Neurological:      Mental Status: He is alert and oriented to person, place, and time  Psychiatric:         Mood and Affect: Mood normal          Behavior: Behavior normal          Thought Content:  Thought content normal          Judgment: Judgment normal        Invasive Devices:      Lab Results:   Results from last 7 days   Lab Units 04/01/21  0521 03/31/21  1651   WBC Thousand/uL 7 87 6 36   HEMOGLOBIN g/dL 12 7 12 7   HEMATOCRIT % 39 8 40 8   PLATELETS Thousands/uL 245 265   POTASSIUM mmol/L 4 4 5 3   CHLORIDE mmol/L 103 98*   CO2 mmol/L 19* 30   BUN mg/dL 32* 22   CREATININE mg/dL 8 32* 6 60*   CALCIUM mg/dL 9 0 8 9   ALK PHOS U/L  --  89   ALT U/L  --  14   AST U/L  --  15     Lab Results   Component Value Date     2 (H) 10/22/2020    CALCIUM 9 0 04/01/2021    PHOS 4 8 (H) 12/15/2020     Other Studies:

## 2021-04-01 NOTE — CONSULTS
Consult Note - Orthopedics   Elvis Anderson Linda 38 y o  male MRN: [id number]  Unit/Bed#: W -01 Encounter: [id number]      Assessment & Plan     Right elbow cellulitis  1  No appreciable fluid collection noted about the right elbow at this time  Aspiration not indicated at this time  2  Antibiotics per primary team  3  Pain control  4  Orthopedics will follow  Chief Complaint     Right elbow pain and erythema    History of the Present Illness     Radha Red is a 38 y o  male seen in orthopedic consultation at the request of Baron Marie MD for evaluation of patient's right elbow  He notes progressive worsening swelling and erythema over the posterior aspect of the elbow which began approximately one week ago  He denies any injury or trauma  Patient does have history of type 1 diabetes as well as CKD requiring hemodialysis  Pain is worse with range of motion and bearing weight  He denies any numbness and tingling  He denies any weakness or instability  No fevers or chills  Physical Exam     /78   Pulse 79   Temp 98 9 °F (37 2 °C)   Resp 18   Ht 5' 11" (1 803 m)   Wt 96 4 kg (212 lb 8 4 oz)   SpO2 96%   BMI 29 64 kg/m²     Right elbow:  Skin intact  Faint erythema and swelling over the olecranon bursa  No palpable collection or fluctuance noted  Tenderness to palpation olecranon  Elbow ROM is flexion 100, extension -5  Stable to varus and valgus stress  Eyes:  Anicteric sclerae  ENT:  Trachea midline  Lungs:  Clear to auscultation bilaterally  Cardiovascular:  Regular rate and rhythm with audible S1 and S2  Abdomen:  Soft and nontender  Lymphatic:  No palpable lymphadenopathy  Skin:  Intact without erythema  Neurologic:  Sensation grossly intact to light touch  Psychiatric:  Mood and affect are appropriate      Data Review     I have personally reviewed pertinent films in PACS, and my interpretation follows:    X-rays right elbow 3/31/21: No acute osseous abnormalities  No significant degenerative changes  I have personally reviewed pertinent lab results    Lab Results   Component Value Date     10/28/2015    K 4 4 04/01/2021    K 3 6 10/28/2015     04/01/2021     10/28/2015    CO2 19 (L) 04/01/2021    CO2 24 02/21/2020    BUN 32 (H) 04/01/2021    BUN 34 (H) 11/03/2015    CREATININE 8 32 (H) 04/01/2021    CREATININE 1 90 (H) 11/03/2015    GLUCOSE 275 (H) 02/21/2020    GLUCOSE 92 10/28/2015     Lab Results   Component Value Date    WBC 7 87 04/01/2021    WBC 8 54 10/28/2015    HGB 12 7 04/01/2021    HGB 11 6 (L) 10/28/2015     04/01/2021     10/28/2015     Lab Results   Component Value Date    INR 1 01 11/13/2020    PTT 33 11/13/2020       Past Medical History:   Diagnosis Date    Acute kidney injury ([address] )     Ambulates with cane     Anuria     Anxiety     Chronic kidney disease     Depression     Diabetes mellitus ([address]     Diarrhea     Falls     Gastroparesis     GERD (gastroesophageal reflux disease)     History of shingles 2010    History of transfusion 02/2018    no adverse reaction    Hyperlipidemia     Hyperphosphatemia     Hypertension     Itching     Muscle weakness     general unsteadiness    Retinopathy     Seizures ([address] )     early 2020 - one time    Skin abnormality     some dime size areas where skin was scratched from itching    Stroke ([facility])     x2 - off balance/no driving/fatigue    Swelling of both lower extremities     Vomiting     Wears glasses        Past Surgical History:   Procedure Laterality Date    CARDIAC LOOP RECORDER  05/2018    COLONOSCOPY      EGD      EYE SURGERY Right     IR AV FISTULAGRAM/GRAFTOGRAM  2/23/2021    IR TUNNELED CENTRAL LINE PLACEMENT  2/16/2021    IR TUNNELED DIALYSIS CATHETER PLACEMENT  11/18/2020    IR TUNNELED DIALYSIS CATHETER REMOVAL  2/12/2021    IR TUNNELED DIALYSIS CATHETER REMOVAL  3/11/2021    PERITONEAL CATHETER INSERTION N/A 8/27/2018 Procedure: UNROOF PD CATHETER;  Surgeon: Anish Garcia DO;  [facility];  Service: General    CO ANASTOMOSIS,AV,ANY SITE Left 11/9/2020    Procedure: CREATION FISTULA  ARTERIOVENOUS (AV) - LEFT WRIST;  Surgeon: Sarah Foy MD;  [facility];  Service: Vascular    CO ESOPHAGOGASTRODUODENOSCOPY TRANSORAL DIAGNOSTIC N/A 4/18/2019    Procedure: ESOPHAGOGASTRODUODENOSCOPY (EGD); Surgeon: Yves Nguyen MD;  [facility]; Service: Gastroenterology    CO LAP INSERTION TUNNELED INTRAPERITONEAL CATHETER N/A 8/6/2018    Procedure: LAPAROSCOPIC PD CATHETER PLACEMENT;  Surgeon: Anish Garcia DO;  [facility];  Service: General    CO REMOVAL TUNNELED INTRAPERITONEAL CATHETER N/A 11/18/2020    Procedure: REMOVAL CATHETER PERITONEAL DIALYSIS;  Surgeon: Pieter Bates MD;  [facility];  Service: General    TONSILLECTOMY      UPPER GASTROINTESTINAL ENDOSCOPY         Allergies   Allergen Reactions    Sulfa Antibiotics Rash       No current facility-administered medications on file prior to encounter        Current Outpatient Medications on File Prior to Encounter   Medication Sig Dispense Refill    Admelog SoloStar 100 units/mL injection pen       aspirin 81 mg chewable tablet Chew 81 mg daily      atorvastatin (LIPITOR) 40 mg tablet Take 1 tablet (40 mg total) by mouth every evening 90 tablet 1    b complex vitamins capsule Take 1 capsule by mouth daily before lunch       B-D ULTRAFINE III SHORT PEN 31G X 8 MM MISC USE 1 PEN NEEDLE 8 TIMES DAILY 100 each 47    calcium acetate (PHOSLO) capsule TAKE 3 CAPSULES BY MOUTH WITH MEALS      cholecalciferol (VITAMIN D3) 1,000 units tablet Take 1,000 Units by mouth daily      cinacalcet (SENSIPAR) 60 MG tablet Take 60 mg by mouth daily Non-dialysis days      cloNIDine (CATAPRES-TTS-2) 0 2 mg/24 hr       doxazosin (CARDURA) 8 MG tablet Take 8 mg by mouth daily at bedtime      escitalopram (LEXAPRO) 10 mg tablet Take 1 tablet (10 mg total) by mouth daily 90 tablet 2    gabapentin (NEURONTIN) 100 mg capsule Take 2 tablets at bedtime (Patient taking differently: 200 mg Take 2 tablets at bedtime) 60 capsule 5    GLUCAGON EMERGENCY 1 MG injection INJECT 1MG SUBCUTANEOUSLY AS NEEDED (AS DIRECTED)   MAY REPEAT DOSE EVERY 20 MINUTES AS NEEDED  3    hydrALAZINE (APRESOLINE) 100 MG tablet Take 0 5 tablets (50 mg total) by mouth 2 (two) times a day (Patient taking differently: Take 50 mg by mouth 3 (three) times a day ) 90 tablet 1    insulin glargine (Basaglar KwikPen) 100 units/mL injection pen Inject 14 Units under the skin daily at bedtime (Patient taking differently: Inject 15 Units under the skin daily at bedtime )      insulin lispro (HumaLOG) 100 units/mL injection Inject 4 Units under the skin 3 (three) times a day with meals 10 mL 0    labetalol (NORMODYNE) 300 mg tablet Take 1 tablet (300 mg total) by mouth every 12 (twelve) hours (Patient taking differently: Take 300 mg by mouth 2 (two) times a day ) 60 tablet 0    lisinopril (ZESTRIL) 20 mg tablet Take 1 tablet (20 mg total) by mouth daily (Patient taking differently: Take 20 mg by mouth 2 (two) times a day ) 30 tablet 0    Lokelma 5 g PACK 2 (two) times a day       minoxidil (LONITEN) 2 5 mg tablet TAKE 1 2 (ONE HALF) TABLET BY MOUTH TWICE DAILY      NIFEdipine ER (ADALAT CC) 60 MG 24 hr tablet Take 1 tablet (60 mg total) by mouth 2 (two) times a day 180 tablet 0    ondansetron (ZOFRAN) 4 mg tablet Take 4 mg by mouth every 8 (eight) hours as needed for nausea or vomiting      saccharomyces boulardii (FLORASTOR) 250 mg capsule Take 1 capsule (250 mg total) by mouth 2 (two) times a day 60 capsule 0    torsemide (DEMADEX) 100 mg tablet Take 100 mg by mouth 2 (two) times a day       famotidine (PEPCID) 40 MG tablet Take 1 tablet (40 mg total) by mouth 2 (two) times a day (Patient not taking: Reported on 3/31/2021) 60 tablet 5    loperamide (IMODIUM) 2 mg capsule Take 1 capsule (2 mg total) by mouth 3 (three) times a day as needed for diarrhea (Patient not taking: Reported on 3/31/2021) 30 capsule 0    Metoclopramide HCl (REGLAN PO) Take by mouth Unknown dose      metolazone (ZAROXOLYN) 10 mg tablet Take 10 mg by mouth daily      polyethylene glycol (GOLYTELY) 4000 mL solution Take 4,000 mL by mouth once for 1 dose 4000 mL 0    sevelamer (RENAGEL) 800 mg tablet Take 3 tablets (2,400 mg total) by mouth 3 (three) times a day with meals (Patient not taking: Reported on 3/31/2021) 270 tablet 0    sevelamer carbonate (Renvela) 800 mg tablet 3 (three) times a day with meals          Social History     Tobacco Use    Smoking status: Former Smoker     Packs/day: 0 50     Years: 12 00     Pack years: 6 00     Types: Cigarettes     Quit date: 02/2018     Years since quitting: 3 1    Smokeless tobacco: Never Used    Tobacco comment: quit 2/2018   Substance Use Topics    Alcohol use: Not Currently    Drug use: Yes     Types: Marijuana     Comment: medical marijuana       Family History   Problem Relation Age of Onset    Breast cancer Mother     Hypertension Mother     Hyperlipidemia Father     Hypertension Father     Leukemia Maternal Grandmother     Hyperlipidemia Maternal Grandfather     Hypertension Maternal Grandfather     Hyperlipidemia Paternal Grandmother     Hypertension Paternal Grandmother     Heart disease Paternal Grandfather         cardiac disorder    Diabetes Paternal Grandfather        Review of Systems     As stated in the HPI  All other systems were reviewed and are negative

## 2021-04-01 NOTE — ASSESSMENT & PLAN NOTE
· Patient previously on PD, however this was changed in December to HD 2/2 recurrent peritonitis  · Continue HD  · Labs appear stable at this time   · Consult nephrology for help with HD appreciate assistance

## 2021-04-01 NOTE — PROGRESS NOTES
[facility]  Progress Note Tom Cox 1985, 38 y o  male MRN: [id number]  Unit/Bed#: W -01 Encounter: [id number]  Primary Care Provider: Te García DO   Date and time admitted to hospital: 3/31/2021  3:26 PM    * Elbow pain, right  Assessment & Plan  2/2 cellulitis vs less likely bursitis vs unlikely septic arthritis   POA: Worsening swelling and pain in right elbow x4d  +limited ROM  · Patient states that he does ambulate with a cane with his right arm has been doing so for approximately 3 years, and does state that he newly started hemodialysis a couple of months ago and he does rest his right olecranon on the hemodialysis chair  · Was started on ceftriaxone and vancomycin in the emergency department possible septic bursitis  · CRP mildly elevated     Plan:   · Ortho consulted - no effusion, unlikely septic arthritis, more likely cellulitis  Recommend Abx  · Continue IV keflex 500mg (3x weekly per pharmacy)  Monitor temps, wbc  · Ice and elevation    End stage renal disease on dialysis due to type 1 diabetes mellitus [facility])  Assessment & Plan  Patient previously on Peritoneal dialysis but had recurrent peritonitis thus changed to HD in December  Plan   · Continue HD, nephrology on board  · Follow BMP, monitor cr and lytes  (P) 112 [id number]    Renovascular hypertension  Assessment & Plan  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  BP acceptable today  Plan:   · Monitor blood pressure  · Continue home meds including:  · Cardura 2 mg daily  · Clonidine patch 0 3 mg  · Hydralazine 50 mg t i d  · Labetalol 300 mg b i d  Monia Le · Lisinopril 20 mg b i d  · Nifedipine 60 mg b i d  Monia Le · Torsemide 100 mg b i d  Monia Le · Metallic zone 10 mg daily      History of lacunar cerebrovascular accident (CVA)  Assessment & Plan  · Continue ASA, statin    Type 1 diabetes mellitus [facility])  Assessment & Plan  Lab Results   Component Value Date    HGBA1C 6 9 (H) 11/13/2020 Recent Labs     21  2345 21  0644 21  1058   POCGLU 254* 155* 102       Blood Sugar Average: Last 72 hrs:    · Continue home insulin regimen   · lantus 6 units HS   · humalog 4 units TID   · Continue SSI for coverage   · accuchecks   · hypoglycemia protocol   (P) 274 [phone]      VTE Pharmacologic Prophylaxis:   Pharmacologic: not indicated at this time  Mechanical VTE Prophylaxis in Place: No    Discussions with Specialists or Other Care Team Provider: attending     Education and Discussions with Family / Patient: discussed plan with pt     Current Length of Stay: 0 day(s)    Current Patient Status: Observation     Discharge Plan / Estimated Discharge Date: 24-48 hr     Code Status: Level 1 - Full Code      Subjective:   Pleasant young gentleman  Reports improvement in right elbow pain though not resolved  +limited ROM in right elbow, limited extension  No fevers, night sweats  ROS negative except for as above  Objective:     Vitals:   Temp (24hrs), Av 9 °F (37 2 °C), Min:98 7 °F (37 1 °C), Max:99 1 °F (37 3 °C)    Temp:  [98 7 °F (37 1 °C)-99 1 °F (37 3 °C)] 98 9 °F (37 2 °C)  HR:  [77-83] 79  Resp:  [16-18] 18  BP: (157-210)/() 157/78  SpO2:  [95 %-98 %] 96 %  Body mass index is 29 64 kg/m²  Input and Output Summary (last 24 hours): Intake/Output Summary (Last 24 hours) at 2021 1348  Last data filed at 2021 0600  Gross per 24 hour   Intake 790 ml   Output 450 ml   Net 340 ml       Physical Exam:     Physical Exam  Vitals signs and nursing note reviewed  Constitutional:       General: He is not in acute distress  Appearance: He is ill-appearing (chronically )  He is not toxic-appearing or diaphoretic  HENT:      Mouth/Throat:      Mouth: Mucous membranes are moist    Eyes:      Extraocular Movements: Extraocular movements intact  Pupils: Pupils are equal, round, and reactive to light  Neck:      Musculoskeletal: Neck supple   No neck rigidity  Cardiovascular:      Rate and Rhythm: Normal rate and regular rhythm  Pulses: Normal pulses  Heart sounds: Normal heart sounds  Comments: HD fistula in left forearm - thrill palpable   Pulmonary:      Effort: Pulmonary effort is normal       Breath sounds: Normal breath sounds  Abdominal:      General: Bowel sounds are normal       Palpations: Abdomen is soft  Musculoskeletal:         General: Swelling (R elbow) present  Right shoulder: He exhibits decreased range of motion (limited extension)  Arms:       Right lower leg: No edema  Left lower leg: No edema  Skin:     Capillary Refill: Capillary refill takes less than 2 seconds  Neurological:      Mental Status: He is alert and oriented to person, place, and time  Psychiatric:         Mood and Affect: Mood normal       Comments: Pleasant affect           Additional Data:     Labs:    Results from last 7 days   Lab Units 04/01/21  0521   WBC Thousand/uL 7 87   HEMOGLOBIN g/dL 12 7   HEMATOCRIT % 39 8   PLATELETS Thousands/uL 245   NEUTROS PCT % 64   LYMPHS PCT % 17   MONOS PCT % 8   EOS PCT % 10*     Results from last 7 days   Lab Units 04/01/21  0521 03/31/21  1651   POTASSIUM mmol/L 4 4 5 3   CHLORIDE mmol/L 103 98*   CO2 mmol/L 19* 30   BUN mg/dL 32* 22   CREATININE mg/dL 8 32* 6 60*   CALCIUM mg/dL 9 0 8 9   ALK PHOS U/L  --  89   ALT U/L  --  14   AST U/L  --  15           * I Have Reviewed All Lab Data Listed Above  * Additional Pertinent Lab Tests Reviewed: Ellen 64 Admission Reviewed    Imaging:    Imaging Reports Reviewed Today Include: XR elbow   Imaging Personally Reviewed by Myself Includes:  XR elbow    Recent Cultures (last 7 days):     Results from last 7 days   Lab Units 03/31/21  1654 03/31/21  1651   BLOOD CULTURE  Received in Microbiology Lab  Culture in Progress  Received in Microbiology Lab  Culture in Progress         Last 24 Hours Medication List:   Current Facility-Administered Medications   Medication Dose Route Frequency Provider Last Rate    acetaminophen  650 mg Oral Q6H PRN Yolande Blake PA-C      aspirin  81 mg Oral Daily Sudarshan Womack PA-C      atorvastatin  40 mg Oral QPM Sudarshan Womack PA-C      cefazolin  500 mg Intravenous Once per day on Mon Wed Fri Raheem Bruno  mg (04/01/21 1210)    cholecalciferol  1,000 Units Oral Daily Sudarshan Womack PA-C      [START ON 4/2/2021] cinacalcet  60 mg Oral Every Other Day Mardell Eans, CRCARLOS      cloNIDine  0 3 mg Transdermal Weekly Mardell Eans, CRNP      [START ON 4/2/2021] doxazosin  2 mg Oral Daily Mardell Eans, CRCARLOS      doxazosin  8 mg Oral HS Sudarshan Womack PA-C      escitalopram  10 mg Oral Daily Sudarshan Womack PA-C      famotidine  40 mg Oral HS PRN Yolande Blake PA-C      gabapentin  200 mg Oral HS Sudarshan Womack PA-C      hydrALAZINE  50 mg Oral TID Yolande Blake PA-C      insulin glargine  6 Units Subcutaneous HS Sudarshan Womack PA-C      insulin lispro  1-5 Units Subcutaneous HS Sudarshan Womack PA-C      insulin lispro  1-6 Units Subcutaneous TID AC Sudarshan Womack PA-C      insulin lispro  4 Units Subcutaneous TID With Meals Sudarshan Womack PA-C      labetalol  300 mg Oral BID Sudarshan Womack PA-C      lisinopril  20 mg Oral BID Sudarshan Womack PA-C      metolazone  10 mg Oral Daily Sudarshan Womack PA-C      [START ON 4/2/2021] minoxidil  1 25 mg Oral Once per day on Mon Wed Fri Mardell Eans, CRNP      minoxidil  10 mg Oral Daily Mardell Eans, CRNP      multivitamin stress formula  1 tablet Oral Daily Before Lunch Sudarshan Womack PA-C      NIFEdipine ER  60 mg Oral BID Sudarshan Womack PA-C      saccharomyces boulardii  250 mg Oral BID Sudarshan Womack PA-C      torsemide  100 mg Oral BID Sudarshan Womack PA-C          Today, Patient Was Seen By: Hesham Herrmann MD    ** Please Note: This note has been constructed using a voice recognition system   **

## 2021-04-01 NOTE — ASSESSMENT & PLAN NOTE
Patient previously on Peritoneal dialysis but had recurrent peritonitis thus changed to HD in December  Plan   · Continue HD, nephrology on board  · Follow BMP, monitor cr and lytes     (P) [id number]

## 2021-04-01 NOTE — ASSESSMENT & PLAN NOTE
Lab Results   Component Value Date    HGBA1C 6 9 (H) 11/13/2020       Recent Labs     03/31/21  2345 04/01/21  0644 04/01/21  1058   POCGLU 254* 155* 102       Blood Sugar Average: Last 72 hrs:    · Continue home insulin regimen   · lantus 6 units HS   · humalog 4 units TID   · Continue SSI for coverage   · accuchecks   · hypoglycemia protocol   (P) 090 [id number]

## 2021-04-01 NOTE — ASSESSMENT & PLAN NOTE
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

## 2021-04-01 NOTE — ASSESSMENT & PLAN NOTE
· History of uncontrolled hypertension likely secondary to above problem  · Continue home medications including:  · Cardura 2 mg daily  · Clonidine patch 0 3 mg  · Hydralazine 50 mg t i d  · Labetalol 300 mg b i d  Kerline Amos · Lisinopril 20 mg b i d  · Nifedipine 60 mg b i d  Kerline Amos · Torsemide 100 mg b i d  Kerline Amso · Metallic zone 10 mg daily  · Patient with history of severely uncontrolled blood pressure, controlled here    · Monitor blood pressure

## 2021-04-01 NOTE — PLAN OF CARE
Problem: Nutrition/Hydration-ADULT  Goal: Nutrient/Hydration intake appropriate for improving, restoring or maintaining nutritional needs  Description: Monitor and assess patient's nutrition/hydration status for malnutrition  Collaborate with interdisciplinary team and initiate plan and interventions as ordered  Monitor patient's weight and dietary intake as ordered or per policy  Utilize nutrition screening tool and intervene as necessary  Determine patient's food preferences and provide high-protein, high-caloric foods as appropriate       INTERVENTIONS:  - Monitor oral intake, urinary output, labs, and treatment plans  - Assess nutrition and hydration status and recommend course of action  - Evaluate amount of meals eaten  - Assist patient with eating if necessary   - Allow adequate time for meals  - Recommend/ encourage appropriate diets, oral nutritional supplements, and vitamin/mineral supplements  - Order, calculate, and assess calorie counts as needed  - Recommend, monitor, and adjust tube feedings and TPN/PPN based on assessed needs  - Assess need for intravenous fluids  - Provide specific nutrition/hydration education as appropriate  - Include patient/family/caregiver in decisions related to nutrition  Outcome: Progressing     Problem: PAIN - ADULT  Goal: Verbalizes/displays adequate comfort level or baseline comfort level  Description: Interventions:  - Encourage patient to monitor pain and request assistance  - Assess pain using appropriate pain scale  - Administer analgesics based on type and severity of pain and evaluate response  - Implement non-pharmacological measures as appropriate and evaluate response  - Consider cultural and social influences on pain and pain management  - Notify physician/advanced practitioner if interventions unsuccessful or patient reports new pain  Outcome: Progressing     Problem: INFECTION - ADULT  Goal: Absence or prevention of progression during hospitalization  Description: INTERVENTIONS:  - Assess and monitor for signs and symptoms of infection  - Monitor lab/diagnostic results  - Monitor all insertion sites, i e  indwelling lines, tubes, and drains  - Monitor endotracheal if appropriate and nasal secretions for changes in amount and color  - [place] appropriate cooling/warming therapies per order  - Administer medications as ordered  - Instruct and encourage patient and family to use good hand hygiene technique  - Identify and instruct in appropriate isolation precautions for identified infection/condition  Outcome: Progressing  Goal: Absence of fever/infection during neutropenic period  Description: INTERVENTIONS:  - Monitor WBC    Outcome: Progressing     Problem: SAFETY ADULT  Goal: Patient will remain free of falls  Description: INTERVENTIONS:  - Assess patient frequently for physical needs  -  Identify cognitive and physical deficits and behaviors that affect risk of falls    -  [place] fall precautions as indicated by assessment   - Educate patient/family on patient safety including physical limitations  - Instruct patient to call for assistance with activity based on assessment  - Modify environment to reduce risk of injury  - Consider OT/PT consult to assist with strengthening/mobility  Outcome: Progressing  Goal: Maintain or return to baseline ADL function  Description: INTERVENTIONS:  -  Assess patient's ability to carry out ADLs; assess patient's baseline for ADL function and identify physical deficits which impact ability to perform ADLs (bathing, care of mouth/teeth, toileting, grooming, dressing, etc )  - Assess/evaluate cause of self-care deficits   - Assess range of motion  - Assess patient's mobility; develop plan if impaired  - Assess patient's need for assistive devices and provide as appropriate  - Encourage maximum independence but intervene and supervise when necessary  - Involve family in performance of ADLs  - Assess for home care needs following discharge   - Consider OT consult to assist with ADL evaluation and planning for discharge  - Provide patient education as appropriate  Outcome: Progressing  Goal: Maintain or return mobility status to optimal level  Description: INTERVENTIONS:  - Assess patient's baseline mobility status (ambulation, transfers, stairs, etc )    - Identify cognitive and physical deficits and behaviors that affect mobility  - Identify mobility aids required to assist with transfers and/or ambulation (gait belt, sit-to-stand, lift, walker, cane, etc )  - [place] fall precautions as indicated by assessment  - Record patient progress and toleration of activity level on Mobility SBAR; progress patient to next Phase/Stage  - Instruct patient to call for assistance with activity based on assessment  - Consider rehabilitation consult to assist with strengthening/weightbearing, etc   Outcome: Progressing     Problem: DISCHARGE PLANNING  Goal: Discharge to home or other facility with appropriate resources  Description: INTERVENTIONS:  - Identify barriers to discharge w/patient and caregiver  - Arrange for needed discharge resources and transportation as appropriate  - Identify discharge learning needs (meds, wound care, etc )  - Arrange for interpretive services to assist at discharge as needed  - Refer to Case Management Department for coordinating discharge planning if the patient needs post-hospital services based on physician/advanced practitioner order or complex needs related to functional status, cognitive ability, or social support system  Outcome: Progressing     Problem: Knowledge Deficit  Goal: Patient/family/caregiver demonstrates understanding of disease process, treatment plan, medications, and discharge instructions  Description: Complete learning assessment and assess knowledge base    Interventions:  - Provide teaching at level of understanding  - Provide teaching via preferred learning methods  Outcome: Progressing

## 2021-04-01 NOTE — H&P
[facility]  H&P- Chalice Bloch 1985, 38 y o  male MRN: [id number]  Unit/Bed#: MONSERRAT U[facility] Encounter: [id number]  Primary Care Provider: Justus Bence, DO   Date and time admitted to hospital: 3/31/2021  3:26 PM    Olecranon bursitis  Assessment & Plan  · Patient with worsening swelling and pain in the left olecarnon since Monday  He has never had this pain before and came in when he noticed limited range of motion  · Patient states that he does ambulate with a cane with his right arm has been doing so for approximately 3 years, and does state that he newly started hemodialysis a couple of months ago and he does rest his right olecranon on the hemodialysis chair  · Was started on ceftriaxone and vancomycin in the emergency department possible septic bursitis  · CRP is mildly elevated, will check ESR  · No white count, or fevers, low suspicion for septic joint, or bursitis  · Differentials include gout verses bursitis  · Consult orthopedics  · Ice and elevation    End stage renal disease on dialysis due to type 1 diabetes mellitus [facility])  Assessment & Plan  · Patient previously on PD, however this was changed in December to HD 2/2 recurrent peritonitis  · Continue HD  · Labs appear stable at this time   · Consult nephrology for help with HD appreciate assistance  History of lacunar cerebrovascular accident (CVA)  Assessment & Plan  · Continue ASA, statin    Renovascular hypertension  Assessment & Plan  · History of uncontrolled hypertension likely secondary to above problem  · Continue home medications including:  · Cardura 2 mg daily  · Clonidine patch 0 3 mg  · Hydralazine 50 mg t i d  · Labetalol 300 mg b i d  Casey Rebolledo · Lisinopril 20 mg b i d  · Nifedipine 60 mg b i d  Casey Rebolledo · Torsemide 100 mg b i d  Casey Rebolledo · Metallic zone 10 mg daily  · Patient with history of severely uncontrolled blood pressure, controlled here    · Monitor blood pressure    Type 1 diabetes mellitus [facility])  Assessment & Plan  Lab Results   Component Value Date    HGBA1C 6 9 (H) 11/13/2020       No results for input(s): POCGLU in the last 72 hours  Blood Sugar Average: Last 72 hrs:    · Continue home insulin regimen   · lantus 6 units HS   · humalog 4 units TID   · Continue this w/ sliding scale   · accuchecks         VTE Prophylaxis: Heparin  / sequential compression device   Code Status: full   POLST: There is no POLST form on file for this patient (pre-hospital)  Discussion with family: patient     Anticipated Length of Stay:  Patient will be admitted on an Observation basis with an anticipated length of stay of  < 2 midnights  Justification for Hospital Stay: elbow pain and swelling  Total Time for Visit, including Counseling / Coordination of Care: 1 hour  Greater than 50% of this total time spent on direct patient counseling and coordination of care  Chief Complaint:   Elbow pain    History of Present Illness:    Perry Nayak is a 38 y o  male who presents with elbow pain  Past medical history significant for chronic kidney disease on hemodialysis Monday, Wednesday, Friday, renovascular hypertension, type 1 diabetes, recurrent peritoneal dialysis for which he has switched to hemodialysis  He presents to the emergency department for right elbow pain and swelling which has been worsening over the past week  Patient states that his pain initially began on Monday and has been gradually worsening  He describes limited range of motion with flexion, and extension of the elbow secondary to pain  He states that his pain is more localized to the back of his elbow and to the lateral aspect  He denies any chronic use  He does state that he recently started hemodialysis in November and he has recently been resting his right elbow on hemodialysis chair  He does walk with a cane using his right hand  He denies any fevers, chills, drainage, or trauma to that area      Review of Systems:    Review of Systems   Constitutional: Negative for chills, fatigue, fever and unexpected weight change  Respiratory: Negative for cough, chest tightness and shortness of breath  Cardiovascular: Negative for chest pain and palpitations  Gastrointestinal: Negative for abdominal pain, diarrhea, nausea and vomiting  Genitourinary: Negative for decreased urine volume, dysuria, frequency and urgency  Musculoskeletal: Positive for arthralgias and joint swelling  Skin: Positive for color change  Negative for wound  Neurological: Negative for dizziness, syncope, light-headedness and headaches  All other systems reviewed and are negative        Past Medical and Surgical History:     Past Medical History:   Diagnosis Date    Acute kidney injury ([facility] )     Ambulates with cane     Anuria     Anxiety     Chronic kidney disease     Depression     Diabetes mellitus ([facility] )     Diarrhea     Falls     Gastroparesis     GERD (gastroesophageal reflux disease)     History of shingles 2010    History of transfusion 02/2018    no adverse reaction    Hyperlipidemia     Hyperphosphatemia     Hypertension     Itching     Muscle weakness     general unsteadiness    Retinopathy     Seizures ([facility] )     early 2020 - one time    Skin abnormality     some dime size areas where skin was scratched from itching    Stroke ([facility] )     x2 - off balance/no driving/fatigue    Swelling of both lower extremities     Vomiting     Wears glasses        Past Surgical History:   Procedure Laterality Date    CARDIAC LOOP RECORDER  05/2018    COLONOSCOPY      EGD      EYE SURGERY Right     IR AV FISTULAGRAM/GRAFTOGRAM  2/23/2021    IR TUNNELED CENTRAL LINE PLACEMENT  2/16/2021    IR TUNNELED DIALYSIS CATHETER PLACEMENT  11/18/2020    IR TUNNELED DIALYSIS CATHETER REMOVAL  2/12/2021    IR TUNNELED DIALYSIS CATHETER REMOVAL  3/11/2021    PERITONEAL CATHETER INSERTION N/A 8/27/2018    Procedure: UNROOF PD CATHETER;  Surgeon: Nisha Holbrook DO;  [facility];  Service: General    IL ANASTOMOSIS,AV,ANY SITE Left 11/9/2020    Procedure: CREATION FISTULA  ARTERIOVENOUS (AV) - LEFT WRIST;  Surgeon: Dhruv Wayne MD;  [facility];  Service: Vascular    IL ESOPHAGOGASTRODUODENOSCOPY TRANSORAL DIAGNOSTIC N/A 4/18/2019    Procedure: ESOPHAGOGASTRODUODENOSCOPY (EGD); Surgeon: Julio Lopez MD;  Location: [facility]; Service: Gastroenterology    IL LAP INSERTION TUNNELED INTRAPERITONEAL CATHETER N/A 8/6/2018    Procedure: LAPAROSCOPIC PD CATHETER PLACEMENT;  Surgeon: Nisha Holbrook DO;  [facility];  Service: General    IL REMOVAL TUNNELED INTRAPERITONEAL CATHETER N/A 11/18/2020    Procedure: REMOVAL CATHETER PERITONEAL DIALYSIS;  Surgeon: Yazmin Ni MD;  [facility];  Service: General    TONSILLECTOMY      UPPER GASTROINTESTINAL ENDOSCOPY         Meds/Allergies:    Prior to Admission medications    Medication Sig Start Date End Date Taking?  Authorizing Provider   Admelog SoloStar 100 units/mL injection pen  2/5/21   Historical Provider, MD   aspirin 81 mg chewable tablet Chew 81 mg daily    Historical Provider, MD   atorvastatin (LIPITOR) 40 mg tablet Take 1 tablet (40 mg total) by mouth every evening 3/23/21   Suni Hager DO   b complex vitamins capsule Take 1 capsule by mouth daily before lunch     Historical Provider, MD QUEEN ULTRAFINE III SHORT PEN 31G X 8 MM MISC USE 1 PEN NEEDLE 8 TIMES DAILY 9/24/19   Suni Hager DO   bisacodyl (DULCOLAX) 5 mg EC tablet Take 2 tablets (10 mg total) by mouth once for 1 dose 1/18/21 1/21/21  Martita Lepe PA-C   bismuth subsalicylate (PEPTO BISMOL) 524 mg/30 mL oral suspension Take 15 mL (262 mg total) by mouth 2 (two) times a day as needed for diarrhea 10/26/20   Lin Quiroz MD   calcium acetate (PHOSLO) capsule TAKE 3 CAPSULES BY MOUTH WITH MEALS 3/12/21   Historical Provider, MD   cholecalciferol (VITAMIN D3) 1,000 units tablet Take 1,000 Units by mouth daily    Historical Provider, MD   cinacalcet (SENSIPAR) 60 MG tablet Take 60 mg by mouth daily Non-dialysis days    Historical Provider, MD   cloNIDine (CATAPRES-TTS-3) 0 3 mg/24 hr clonidine 0 3 mg/24 hr weekly transdermal patch   APPLY 1 PATCH TOPICALLY ONCE A WEEK    Historical Provider, MD   doxazosin (CARDURA) 2 mg tablet Take 1 tablet (2 mg total) by mouth daily at bedtime  Patient taking differently: Take 2 mg by mouth daily before lunch  3/3/20   Mateo Vincent MD   doxazosin (CARDURA) 8 MG tablet Take 8 mg by mouth daily at bedtime    Historical Provider, MD   escitalopram (LEXAPRO) 10 mg tablet Take 1 tablet (10 mg total) by mouth daily 1/2/20   Ruth Urena DO   famotidine (PEPCID) 40 MG tablet Take 1 tablet (40 mg total) by mouth 2 (two) times a day  Patient taking differently: Take 40 mg by mouth daily at bedtime as needed  12/7/20   Garrison Jennings PA-C   gabapentin (NEURONTIN) 100 mg capsule Take 2 tablets at bedtime 1/22/21   Ruth Urena DO   GLUCAGON EMERGENCY 1 MG injection INJECT 1MG SUBCUTANEOUSLY AS NEEDED (AS DIRECTED)   MAY REPEAT DOSE EVERY 20 MINUTES AS NEEDED 7/24/19   Historical Provider, MD   hydrALAZINE (APRESOLINE) 100 MG tablet Take 0 5 tablets (50 mg total) by mouth 2 (two) times a day  Patient taking differently: Take 50 mg by mouth 3 (three) times a day  11/19/20   Ruth Urena DO   insulin glargine (Basaglar KwikPen) 100 units/mL injection pen Inject 14 Units under the skin daily at bedtime  Patient taking differently: Inject 6 Units under the skin daily at bedtime  12/17/20   Shruthi Ni MD   insulin lispro (HumaLOG) 100 units/mL injection Inject 4 Units under the skin 3 (three) times a day with meals 11/25/20   Parmjit Ceja MD   labetalol (NORMODYNE) 300 mg tablet Take 1 tablet (300 mg total) by mouth every 12 (twelve) hours  Patient taking differently: Take 300 mg by mouth 2 (two) times a day  3/3/20   Corbin Ballard MD   lisinopril (ZESTRIL) 20 mg tablet Take 1 tablet (20 mg total) by mouth daily  Patient taking differently: Take 20 mg by mouth 2 (two) times a day  10/27/20   Cristóbal Guerrero MD   Lokelma 5 g PACK  3/16/21   Historical Provider, MD   loperamide (IMODIUM) 2 mg capsule Take 1 capsule (2 mg total) by mouth 3 (three) times a day as needed for diarrhea  Patient not taking: Reported on 3/25/2021 10/26/20   Cristóbal Guerrero MD   metolazone (ZAROXOLYN) 10 mg tablet Take 10 mg by mouth daily    Historical Provider, MD   minoxidil (LONITEN) 2 5 mg tablet TAKE 1 2 (ONE HALF) TABLET BY MOUTH TWICE DAILY 2/9/21   Historical Provider, MD   NIFEdipine ER (ADALAT CC) 60 MG 24 hr tablet Take 1 tablet (60 mg total) by mouth 2 (two) times a day 3/3/20   Nicholas Broussard MD   ondansetron (ZOFRAN) 4 mg tablet Take 4 mg by mouth every 8 (eight) hours as needed for nausea or vomiting    Historical Provider, MD   polyethylene glycol (GOLYTELY) 4000 mL solution Take 4,000 mL by mouth once for 1 dose 1/18/21 1/18/21  Martita Lepe PA-C   saccharomyces boulardii (FLORASTOR) 250 mg capsule Take 1 capsule (250 mg total) by mouth 2 (two) times a day 10/26/20   Cristóbal Guerrero MD   sevelamer (RENAGEL) 800 mg tablet Take 3 tablets (2,400 mg total) by mouth 3 (three) times a day with meals 11/25/20   Therese Treadwell MD   sevelamer carbonate (Renvela) 800 mg tablet 3 (three) times a day with meals     Historical Provider, MD   torsemide (DEMADEX) 100 mg tablet Take 100 mg by mouth 2 (two) times a day     Historical Provider, MD     I have reviewed home medications with patient personally  Allergies:    Allergies   Allergen Reactions    Sulfa Antibiotics Rash       Social History:     Marital Status: Single   Occupation: unknown   Patient Pre-hospital Living Situation: lives qt home   Patient Pre-hospital Level of Mobility: ambulates   Patient Pre-hospital Diet Restrictions: none   Substance Use History:   Social History     Substance and Sexual Activity   Alcohol Use Not Currently     Social History     Tobacco Use   Smoking Status Former Smoker    Packs/day: 0 50    Years: 12 00    Pack years: 6 00    Types: Cigarettes    Quit date: 02/2018    Years since quitting: 3 1   Smokeless Tobacco Never Used   Tobacco Comment    quit 2/2018     Social History     Substance and Sexual Activity   Drug Use Yes    Types: Marijuana    Comment: medical marijuana       Family History:    Family History   Problem Relation Age of Onset   Juan Manuel Jean Breast cancer Mother     Hypertension Mother     Hyperlipidemia Father     Hypertension Father     Leukemia Maternal Grandmother     Hyperlipidemia Maternal Grandfather     Hypertension Maternal Grandfather     Hyperlipidemia Paternal Grandmother     Hypertension Paternal Grandmother     Heart disease Paternal Grandfather         cardiac disorder    Diabetes Paternal Grandfather        Physical Exam:     Vitals:   Blood Pressure: 159/91 (03/31/21 1524)  Pulse: 83 (03/31/21 1524)  Temperature: 99 1 °F (37 3 °C) (03/31/21 1524)  Temp Source: Oral (03/31/21 1524)  Respirations: 16 (03/31/21 1524)  SpO2: 98 % (03/31/21 1524)    Physical Exam  Constitutional:       General: He is not in acute distress  HENT:      Head: Normocephalic and atraumatic  Mouth/Throat:      Mouth: Mucous membranes are moist       Pharynx: Oropharynx is clear  No oropharyngeal exudate  Eyes:      General:         Right eye: No discharge  Left eye: No discharge  Conjunctiva/sclera: Conjunctivae normal       Pupils: Pupils are equal, round, and reactive to light  Neck:      Musculoskeletal: Neck supple  No muscular tenderness  Cardiovascular:      Rate and Rhythm: Normal rate and regular rhythm  Pulses: Normal pulses  Heart sounds: Normal heart sounds  No murmur  Pulmonary:      Effort: Pulmonary effort is normal  No respiratory distress  Breath sounds: Normal breath sounds  No wheezing or rales     Abdominal:      General: Abdomen is flat  There is no distension  Palpations: Abdomen is soft  Tenderness: There is no abdominal tenderness  Musculoskeletal: Normal range of motion  General: Swelling and tenderness present  Right lower leg: No edema  Left lower leg: No edema  Comments: Limited AROM and PROM of rigth elbow joint  Patient is able to flex, and extend at the elbow joint approximately 90°  Skin:     General: Skin is warm and dry  Capillary Refill: Capillary refill takes less than 2 seconds  Coloration: Skin is not jaundiced  Findings: Erythema (right elbow ) present  Neurological:      General: No focal deficit present  Mental Status: He is alert and oriented to person, place, and time  Cranial Nerves: No cranial nerve deficit  Psychiatric:         Mood and Affect: Mood normal          Additional Data:     Lab Results: I have personally reviewed pertinent reports  Results from last 7 days   Lab Units 03/31/21  1651   WBC Thousand/uL 6 36   HEMOGLOBIN g/dL 12 7   HEMATOCRIT % 40 8   PLATELETS Thousands/uL 265   NEUTROS PCT % 65   LYMPHS PCT % 15   MONOS PCT % 8   EOS PCT % 11*     Results from last 7 days   Lab Units 03/31/21  1651   SODIUM mmol/L 140   POTASSIUM mmol/L 5 3   CHLORIDE mmol/L 98*   CO2 mmol/L 30   BUN mg/dL 22   CREATININE mg/dL 6 60*   ANION GAP mmol/L 12   CALCIUM mg/dL 8 9   ALBUMIN g/dL 3 8   TOTAL BILIRUBIN mg/dL 0 59   ALK PHOS U/L 89   ALT U/L 14   AST U/L 15   GLUCOSE RANDOM mg/dL 243*                       Imaging: I have personally reviewed pertinent reports  XR elbow 3+ vw RIGHT   Final Result by Giovanni Dhillon DO (03/31 1654)      No acute osseous abnormality  Minor soft tissue swelling overlying the olecranon  Workstation performed: [id number]             EKG, Pathology, and Other Studies Reviewed on Admission:   · Xray right elbow: no osseus abnormalities       Allscripts / Epic Records Reviewed: Yes     ** Please Note: This note has been constructed using a voice recognition system   **

## 2021-04-02 ENCOUNTER — APPOINTMENT (INPATIENT)
Dept: DIALYSIS | Facility: HOSPITAL | Age: 36
DRG: 602 | End: 2021-04-02
Payer: MEDICARE

## 2021-04-02 LAB
ANION GAP SERPL CALCULATED.3IONS-SCNC: 17 MMOL/L (ref 4–13)
BASOPHILS # BLD AUTO: 0.11 THOUSANDS/ΜL (ref 0–0.1)
BASOPHILS NFR BLD AUTO: 2 % (ref 0–1)
BUN SERPL-MCNC: 50 MG/DL (ref 5–25)
CALCIUM SERPL-MCNC: 8.9 MG/DL (ref 8.3–10.1)
CHLORIDE SERPL-SCNC: 101 MMOL/L (ref 100–108)
CO2 SERPL-SCNC: 21 MMOL/L (ref 21–32)
CREAT SERPL-MCNC: 11.29 MG/DL (ref 0.6–1.3)
EOSINOPHIL # BLD AUTO: 0.78 THOUSAND/ΜL (ref 0–0.61)
EOSINOPHIL NFR BLD AUTO: 11 % (ref 0–6)
ERYTHROCYTE [DISTWIDTH] IN BLOOD BY AUTOMATED COUNT: 14.8 % (ref 11.6–15.1)
GFR SERPL CREATININE-BSD FRML MDRD: 5 ML/MIN/1.73SQ M
GLUCOSE SERPL-MCNC: 181 MG/DL (ref 65–140)
GLUCOSE SERPL-MCNC: 200 MG/DL (ref 65–140)
GLUCOSE SERPL-MCNC: 217 MG/DL (ref 65–140)
GLUCOSE SERPL-MCNC: 232 MG/DL (ref 65–140)
GLUCOSE SERPL-MCNC: 84 MG/DL (ref 65–140)
HCT VFR BLD AUTO: 39.1 % (ref 36.5–49.3)
HGB BLD-MCNC: 12.6 G/DL (ref 12–17)
IMM GRANULOCYTES # BLD AUTO: 0.03 THOUSAND/UL (ref 0–0.2)
IMM GRANULOCYTES NFR BLD AUTO: 0 % (ref 0–2)
LYMPHOCYTES # BLD AUTO: 1.02 THOUSANDS/ΜL (ref 0.6–4.47)
LYMPHOCYTES NFR BLD AUTO: 15 % (ref 14–44)
MCH RBC QN AUTO: 31.7 PG (ref 26.8–34.3)
MCHC RBC AUTO-ENTMCNC: 32.2 G/DL (ref 31.4–37.4)
MCV RBC AUTO: 98 FL (ref 82–98)
MONOCYTES # BLD AUTO: 0.71 THOUSAND/ΜL (ref 0.17–1.22)
MONOCYTES NFR BLD AUTO: 10 % (ref 4–12)
NEUTROPHILS # BLD AUTO: 4.22 THOUSANDS/ΜL (ref 1.85–7.62)
NEUTS SEG NFR BLD AUTO: 62 % (ref 43–75)
NRBC BLD AUTO-RTO: 0 /100 WBCS
PLATELET # BLD AUTO: 244 THOUSANDS/UL (ref 149–390)
PMV BLD AUTO: 10.2 FL (ref 8.9–12.7)
POTASSIUM SERPL-SCNC: 6 MMOL/L (ref 3.5–5.3)
PROCALCITONIN SERPL-MCNC: 1.86 NG/ML
RBC # BLD AUTO: 3.98 MILLION/UL (ref 3.88–5.62)
SODIUM SERPL-SCNC: 139 MMOL/L (ref 136–145)
WBC # BLD AUTO: 6.87 THOUSAND/UL (ref 4.31–10.16)

## 2021-04-02 PROCEDURE — 99231 SBSQ HOSP IP/OBS SF/LOW 25: CPT | Performed by: PHYSICIAN ASSISTANT

## 2021-04-02 PROCEDURE — 90935 HEMODIALYSIS ONE EVALUATION: CPT | Performed by: INTERNAL MEDICINE

## 2021-04-02 PROCEDURE — 99239 HOSP IP/OBS DSCHRG MGMT >30: CPT | Performed by: FAMILY MEDICINE

## 2021-04-02 PROCEDURE — 82948 REAGENT STRIP/BLOOD GLUCOSE: CPT

## 2021-04-02 PROCEDURE — 84145 PROCALCITONIN (PCT): CPT | Performed by: PHYSICIAN ASSISTANT

## 2021-04-02 PROCEDURE — 99232 SBSQ HOSP IP/OBS MODERATE 35: CPT | Performed by: FAMILY MEDICINE

## 2021-04-02 PROCEDURE — 85025 COMPLETE CBC W/AUTO DIFF WBC: CPT | Performed by: FAMILY MEDICINE

## 2021-04-02 PROCEDURE — 80048 BASIC METABOLIC PNL TOTAL CA: CPT | Performed by: FAMILY MEDICINE

## 2021-04-02 RX ORDER — ACETAMINOPHEN 325 MG/1
650 TABLET ORAL ONCE
Status: COMPLETED | OUTPATIENT
Start: 2021-04-02 | End: 2021-04-02

## 2021-04-02 RX ORDER — VANCOMYCIN HYDROCHLORIDE 1 G/200ML
10 INJECTION, SOLUTION INTRAVENOUS
Status: DISCONTINUED | OUTPATIENT
Start: 2021-04-02 | End: 2021-04-04 | Stop reason: HOSPADM

## 2021-04-02 RX ADMIN — Medication 250 MG: at 17:45

## 2021-04-02 RX ADMIN — INSULIN GLARGINE 6 UNITS: 100 INJECTION, SOLUTION SUBCUTANEOUS at 21:44

## 2021-04-02 RX ADMIN — LABETALOL HYDROCHLORIDE 300 MG: 100 TABLET ORAL at 12:35

## 2021-04-02 RX ADMIN — LABETALOL HYDROCHLORIDE 300 MG: 100 TABLET ORAL at 17:45

## 2021-04-02 RX ADMIN — GABAPENTIN 200 MG: 100 CAPSULE ORAL at 21:35

## 2021-04-02 RX ADMIN — INSULIN LISPRO 4 UNITS: 100 INJECTION, SOLUTION INTRAVENOUS; SUBCUTANEOUS at 17:46

## 2021-04-02 RX ADMIN — CINACALCET 60 MG: 30 TABLET, FILM COATED ORAL at 17:45

## 2021-04-02 RX ADMIN — ZINC 1 TABLET: TAB ORAL at 12:36

## 2021-04-02 RX ADMIN — NIFEDIPINE 60 MG: 30 TABLET, FILM COATED, EXTENDED RELEASE ORAL at 17:45

## 2021-04-02 RX ADMIN — DOXAZOSIN 2 MG: 1 TABLET ORAL at 16:29

## 2021-04-02 RX ADMIN — ACETAMINOPHEN 650 MG: 325 TABLET, FILM COATED ORAL at 05:17

## 2021-04-02 RX ADMIN — HYDRALAZINE HYDROCHLORIDE 50 MG: 25 TABLET, FILM COATED ORAL at 21:35

## 2021-04-02 RX ADMIN — TORSEMIDE 100 MG: 100 TABLET ORAL at 16:29

## 2021-04-02 RX ADMIN — ACETAMINOPHEN 650 MG: 650 SUSPENSION ORAL at 14:48

## 2021-04-02 RX ADMIN — HYDRALAZINE HYDROCHLORIDE 50 MG: 25 TABLET, FILM COATED ORAL at 12:34

## 2021-04-02 RX ADMIN — ESCITALOPRAM OXALATE 10 MG: 10 TABLET ORAL at 12:34

## 2021-04-02 RX ADMIN — VANCOMYCIN HYDROCHLORIDE 2000 MG: 1 INJECTION, POWDER, LYOPHILIZED, FOR SOLUTION INTRAVENOUS at 13:04

## 2021-04-02 RX ADMIN — TORSEMIDE 100 MG: 100 TABLET ORAL at 12:36

## 2021-04-02 RX ADMIN — Medication 250 MG: at 12:37

## 2021-04-02 RX ADMIN — LISINOPRIL 20 MG: 20 TABLET ORAL at 21:35

## 2021-04-02 RX ADMIN — CEFAZOLIN 500 MG: 1 INJECTION, POWDER, FOR SOLUTION INTRAMUSCULAR; INTRAVENOUS at 12:32

## 2021-04-02 RX ADMIN — MINOXIDIL 1.25 MG: 2.5 TABLET ORAL at 21:38

## 2021-04-02 RX ADMIN — INSULIN LISPRO 4 UNITS: 100 INJECTION, SOLUTION INTRAVENOUS; SUBCUTANEOUS at 12:41

## 2021-04-02 RX ADMIN — HYDRALAZINE HYDROCHLORIDE 50 MG: 25 TABLET, FILM COATED ORAL at 16:29

## 2021-04-02 RX ADMIN — ACETAMINOPHEN 650 MG: 650 SUSPENSION ORAL at 21:44

## 2021-04-02 RX ADMIN — INSULIN LISPRO 3 UNITS: 100 INJECTION, SOLUTION INTRAVENOUS; SUBCUTANEOUS at 17:46

## 2021-04-02 RX ADMIN — ASPIRIN 81 MG CHEWABLE TABLET 81 MG: 81 TABLET CHEWABLE at 12:33

## 2021-04-02 RX ADMIN — LISINOPRIL 20 MG: 20 TABLET ORAL at 12:35

## 2021-04-02 RX ADMIN — METOLAZONE 10 MG: 5 TABLET ORAL at 12:35

## 2021-04-02 RX ADMIN — NIFEDIPINE 60 MG: 30 TABLET, FILM COATED, EXTENDED RELEASE ORAL at 12:36

## 2021-04-02 RX ADMIN — ATORVASTATIN CALCIUM 40 MG: 40 TABLET, FILM COATED ORAL at 17:45

## 2021-04-02 RX ADMIN — Medication 1000 UNITS: at 12:34

## 2021-04-02 RX ADMIN — DOXAZOSIN 8 MG: 4 TABLET ORAL at 21:35

## 2021-04-02 RX ADMIN — INSULIN LISPRO 1 UNITS: 100 INJECTION, SOLUTION INTRAVENOUS; SUBCUTANEOUS at 12:42

## 2021-04-02 NOTE — PROGRESS NOTES
Progress Note - Orthopedics   Elvis Monica Mckeon 38 y o  male MRN: [id number]  Unit/Bed#: W -01      Subjective:    35 y o male seen and evaluated  Patient reports no improvement of elbow pain since yesterday  Denies fevers chills, numbness or tingling       Labs:  0   Lab Value Date/Time    HCT 39 1 04/02/2021 0808    HCT 39 8 04/01/2021 0521    HCT 40 8 03/31/2021 1651    HCT 32 1 (L) 10/28/2015 1743    HCT 31 6 (L) 10/27/2015 1449    HGB 12 6 04/02/2021 0808    HGB 12 7 04/01/2021 0521    HGB 12 7 03/31/2021 1651    HGB 11 6 (L) 10/28/2015 1743    HGB 10 8 (L) 10/27/2015 1449    INR 1 01 11/13/2020 1949    WBC 6 87 04/02/2021 0808    WBC 7 87 04/01/2021 0521    WBC 6 36 03/31/2021 1651    WBC 8 54 10/28/2015 1743    WBC 7 78 10/27/2015 1449    ESR 10 03/31/2021 1651    CRP 25 0 (H) 03/31/2021 1651       Meds:    Current Facility-Administered Medications:     acetaminophen (TYLENOL) oral suspension 650 mg, 650 mg, Oral, Q6H PRN, Sudarshan Womack PA-C, 649 6 mg at 04/01/21 2326    aspirin chewable tablet 81 mg, 81 mg, Oral, Daily, Sudarshan Womack PA-C, Stopped at 04/02/21 8575    atorvastatin (LIPITOR) tablet 40 mg, 40 mg, Oral, QPM, Sudarshan Womack PA-C, 40 mg at 04/01/21 1755    ceFAZolin (ANCEF) 500 mg in sodium chloride 0 9 % 50 mL IVPB, 500 mg, Intravenous, Once per day on Mon Wed Fri, Baron Marie MD, Last Rate: 100 mL/hr at 04/01/21 1210, 500 mg at 04/01/21 1210    cholecalciferol (VITAMIN D3) tablet 1,000 Units, 1,000 Units, Oral, Daily, Sudarshan Womack PA-C, Stopped at 04/02/21 2153    cinacalcet (SENSIPAR) tablet 60 mg, 60 mg, Oral, Every Other Day, Timmy Leblanc MD    cloNIDine (CATAPRES-TTS-3) 0 3 mg/24 hr TD weekly patch, 0 3 mg, Transdermal, Weekly, DEISI Velasquez, Stopped at 04/01/21 1239    doxazosin (CARDURA) tablet 2 mg, 2 mg, Oral, Daily, Timmy Leblanc MD, 2 mg at 04/01/21 1638    doxazosin (CARDURA) tablet 8 mg, 8 mg, Oral, HS, Sudarshan Womack PA-C, 8 mg at 04/01/21 2107    escitalopram (LEXAPRO) tablet 10 mg, 10 mg, Oral, Daily, Sudarshan Womack PA-C, Stopped at 04/02/21 0929    famotidine (PEPCID) tablet 40 mg, 40 mg, Oral, HS PRN, Norm Herrera PA-C    gabapentin (NEURONTIN) capsule 200 mg, 200 mg, Oral, HS, Sudarshan Womack PA-C, 200 mg at 04/01/21 2108    hydrALAZINE (APRESOLINE) tablet 50 mg, 50 mg, Oral, TID, Terri Melo MD, Stopped at 04/02/21 0929    insulin glargine (LANTUS) subcutaneous injection 6 Units 0 06 mL, 6 Units, Subcutaneous, HS, Sudarshan Womack PA-C, 6 Units at 04/01/21 2110    insulin lispro (HumaLOG) 100 units/mL subcutaneous injection 1-5 Units, 1-5 Units, Subcutaneous, HS, Sudarshan Womack PA-C, 1 Units at 04/01/21 2110    insulin lispro (HumaLOG) 100 units/mL subcutaneous injection 1-6 Units, 1-6 Units, Subcutaneous, TID AC, Stopped at 04/02/21 0929 **AND** Fingerstick Glucose (POCT), , , TID AC, Sudarshan Womack PA-C    insulin lispro (HumaLOG) 100 units/mL subcutaneous injection 4 Units, 4 Units, Subcutaneous, TID With Meals, Norm Herrera PA-C, Stopped at 04/02/21 0930    labetalol (NORMODYNE) tablet 300 mg, 300 mg, Oral, BID, Timmy Leblanc MD, Stopped at 04/02/21 0930    Labetalol HCl (NORMODYNE) injection 10 mg, 10 mg, Intravenous, Q6H PRN, Timmy Leblanc MD    lisinopril (ZESTRIL) tablet 20 mg, 20 mg, Oral, BID, Sudarshan Womack PA-C, Stopped at 04/02/21 0930    metolazone (ZAROXOLYN) tablet 10 mg, 10 mg, Oral, Daily Before Lunch, Timmy Leblanc MD    minoxidil (LONITEN) tablet 1 25 mg, 1 25 mg, Oral, Once per day on Mon Wed Fri, DEISI Wilson    minoxidil (LONITEN) tablet 10 mg, 10 mg, Oral, Daily, Thereasa Nageotte, CRNP, Stopped at 04/02/21 0930    multivitamin stress formula tablet 1 tablet, 1 tablet, Oral, Daily Before Lunch, Sudarshan Womack PA-C, 1 tablet at 04/01/21 1208    NIFEdipine (PROCARDIA XL) 24 hr tablet 60 mg, 60 mg, Oral, BID, Sudarshan Womack PA-C, Stopped at 04/02/21 0930    ondansetron (ZOFRAN-ODT) dispersible tablet 4 mg, 4 mg, Oral, Q6H PRN, Timmy Leblanc MD    saccharomyces boulardii (FLORASTOR) capsule 250 mg, 250 mg, Oral, BID, Sudarshan Womack PA-C, Stopped at 04/02/21 0930    torsemide (DEMADEX) tablet 100 mg, 100 mg, Oral, BID before lunch/dinner, Jesus Muñiz MD, 100 mg at 04/01/21 1638    Blood Culture:   Lab Results   Component Value Date    BLOODCX No Growth at 24 hrs  03/31/2021       Wound Culture:   Lab Results   Component Value Date    WOUNDCULT No growth 06/25/2019       Ins and Outs:  I/O last 24 hours: In: 8742 [P O :1320; I V :200; IV Piggyback:50]  Out: 200 [Urine:200]          Physical:  Vitals:    04/02/21 1030   BP: (!) 177/89   Pulse: 70   Resp: 20   Temp:    SpO2:      Musculoskeletal: right Upper Extremity (elbow)  · Skin intact with faint erythema over olecranon  · Mild soft tissue swelling over olecranon bursa without palpable fluctuance or fluid collection  · Elbow ROM -5 to 90 with pain at end point of flexion  · TTP directly over olecranon bursa  · SILT m/r/u    · Motor intact ain/pin/m/r/u  · Intact radial pulse    Assessment:    35 y o male with right elbow cellulitis and mild olecranon bursitis    Plan:  · WBAT RUE  · No indication for aspiration at this time as there is no fluid collection appreciated   · No clinical concern for septic bursitis at this time    · Pain control per primary team  · Dispo: Ortho will follow    Chloe Pratt PA-C

## 2021-04-02 NOTE — ASSESSMENT & PLAN NOTE
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  BP acceptable today  Plan:   · Monitor blood pressure  · Continue home meds including:  · Cardura 2 mg daily  · Clonidine patch 0 3 mg  · Hydralazine 50 mg t i d  · Labetalol 300 mg b i d  Elba Sierra · Lisinopril 20 mg b i d  · Nifedipine 60 mg b i d  Elba Sierra · Torsemide 100 mg b i d  Elba Sierra · Metallic zone 10 mg daily

## 2021-04-02 NOTE — PLAN OF CARE
Problem: Nutrition/Hydration-ADULT  Goal: Nutrient/Hydration intake appropriate for improving, restoring or maintaining nutritional needs  Description: Monitor and assess patient's nutrition/hydration status for malnutrition  Collaborate with interdisciplinary team and initiate plan and interventions as ordered  Monitor patient's weight and dietary intake as ordered or per policy  Utilize nutrition screening tool and intervene as necessary  Determine patient's food preferences and provide high-protein, high-caloric foods as appropriate       INTERVENTIONS:  - Monitor oral intake, urinary output, labs, and treatment plans  - Assess nutrition and hydration status and recommend course of action  - Evaluate amount of meals eaten  - Assist patient with eating if necessary   - Allow adequate time for meals  - Recommend/ encourage appropriate diets, oral nutritional supplements, and vitamin/mineral supplements  - Order, calculate, and assess calorie counts as needed  - Recommend, monitor, and adjust tube feedings and TPN/PPN based on assessed needs  - Assess need for intravenous fluids  - Provide specific nutrition/hydration education as appropriate  - Include patient/family/caregiver in decisions related to nutrition  Outcome: Progressing     Problem: PAIN - ADULT  Goal: Verbalizes/displays adequate comfort level or baseline comfort level  Description: Interventions:  - Encourage patient to monitor pain and request assistance  - Assess pain using appropriate pain scale  - Administer analgesics based on type and severity of pain and evaluate response  - Implement non-pharmacological measures as appropriate and evaluate response  - Consider cultural and social influences on pain and pain management  - Notify physician/advanced practitioner if interventions unsuccessful or patient reports new pain  Outcome: Progressing     Problem: INFECTION - ADULT  Goal: Absence or prevention of progression during hospitalization  Description: INTERVENTIONS:  - Assess and monitor for signs and symptoms of infection  - Monitor lab/diagnostic results  - Monitor all insertion sites, i e  indwelling lines, tubes, and drains  - Monitor endotracheal if appropriate and nasal secretions for changes in amount and color  - [place] appropriate cooling/warming therapies per order  - Administer medications as ordered  - Instruct and encourage patient and family to use good hand hygiene technique  - Identify and instruct in appropriate isolation precautions for identified infection/condition  Outcome: Progressing  Goal: Absence of fever/infection during neutropenic period  Description: INTERVENTIONS:  - Monitor WBC    Outcome: Progressing     Problem: SAFETY ADULT  Goal: Patient will remain free of falls  Description: INTERVENTIONS:  - Assess patient frequently for physical needs  -  Identify cognitive and physical deficits and behaviors that affect risk of falls    -  [place] fall precautions as indicated by assessment   - Educate patient/family on patient safety including physical limitations  - Instruct patient to call for assistance with activity based on assessment  - Modify environment to reduce risk of injury  - Consider OT/PT consult to assist with strengthening/mobility  Outcome: Progressing  Goal: Maintain or return to baseline ADL function  Description: INTERVENTIONS:  -  Assess patient's ability to carry out ADLs; assess patient's baseline for ADL function and identify physical deficits which impact ability to perform ADLs (bathing, care of mouth/teeth, toileting, grooming, dressing, etc )  - Assess/evaluate cause of self-care deficits   - Assess range of motion  - Assess patient's mobility; develop plan if impaired  - Assess patient's need for assistive devices and provide as appropriate  - Encourage maximum independence but intervene and supervise when necessary  - Involve family in performance of ADLs  - Assess for home care needs following discharge   - Consider OT consult to assist with ADL evaluation and planning for discharge  - Provide patient education as appropriate  Outcome: Progressing  Goal: Maintain or return mobility status to optimal level  Description: INTERVENTIONS:  - Assess patient's baseline mobility status (ambulation, transfers, stairs, etc )    - Identify cognitive and physical deficits and behaviors that affect mobility  - Identify mobility aids required to assist with transfers and/or ambulation (gait belt, sit-to-stand, lift, walker, cane, etc )  - [place] fall precautions as indicated by assessment  - Record patient progress and toleration of activity level on Mobility SBAR; progress patient to next Phase/Stage  - Instruct patient to call for assistance with activity based on assessment  - Consider rehabilitation consult to assist with strengthening/weightbearing, etc   Outcome: Progressing     Problem: DISCHARGE PLANNING  Goal: Discharge to home or other facility with appropriate resources  Description: INTERVENTIONS:  - Identify barriers to discharge w/patient and caregiver  - Arrange for needed discharge resources and transportation as appropriate  - Identify discharge learning needs (meds, wound care, etc )  - Arrange for interpretive services to assist at discharge as needed  - Refer to Case Management Department for coordinating discharge planning if the patient needs post-hospital services based on physician/advanced practitioner order or complex needs related to functional status, cognitive ability, or social support system  Outcome: Progressing     Problem: Knowledge Deficit  Goal: Patient/family/caregiver demonstrates understanding of disease process, treatment plan, medications, and discharge instructions  Description: Complete learning assessment and assess knowledge base    Interventions:  - Provide teaching at level of understanding  - Provide teaching via preferred learning methods  Outcome: Progressing

## 2021-04-02 NOTE — ASSESSMENT & PLAN NOTE
2/2 cellulitis vs less likely bursitis vs unlikely septic arthritis   POA: Worsening swelling and pain in right elbow x4d  +limited ROM  · Patient states that he does ambulate with a cane with his right arm has been doing so for approximately 3 years, and does state that he newly started hemodialysis a couple of months ago and he does rest his right olecranon on the hemodialysis chair  · Was started on ceftriaxone and vancomycin in the emergency department possible septic bursitis  · CRP mildly elevated   · Today pain worse this am though appears less erythematous  Still warm  +limited ROM (extension of elbow)    Plan:   · Ortho consulted - no effusion, unlikely septic arthritis, more likely cellulitis  Recommend Abx  · Continue IV keflex 500mg (3x weekly per pharmacy)  If requires them OP will touch base with nephro so can be given with HD at dialysis center  · Monitor temps, wbc  · Ice and elevation    Potential DC tomorrow tentative on continued clinical improvement

## 2021-04-02 NOTE — PROGRESS NOTES
[facility] 48 PROGRESS NOTE   [place] Mckeon 38 y o  male MRN: [id number]  Unit/Bed#: W -84 Encounter: [id number]  Reason for Consult:  ESRD on hemodialysis    ASSESSMENT and PLAN:  1  ESRD on HD [facility], Monday Wednesday and Friday):   · Patient seen on hemodialysis  · Tolerating treat without incident  2  Access:  Left arm AV fistula  Good bruit and thrill  3  Hypertension:    · Blood pressure difficult to manage, chronically elevated  · Continue home blood pressure medications:  Cardura, clonidine patch, hydralazine, labetalol, lisinopril, nifedipine, torsemide, minoxidil  Medications reviewed with patient and adjusted per outpatient prescriptions  4  Anemia:    · Hemoglobin above goal  · On Epogen 5600 units  3 times per week-on hold at this time since hemoglobin above goal  5  Mineral and bone disease:    · Continue outpatient medications:  PhosLo,  Sensipar 60 mg every other day with evening meal, vitamin D2, vitamin D3  6  Electrolytes: On lokelma 5 g on non dialysis days  Not available as inpatient  Patient's family member will bring in medication  7  Olecranon bursitis:  Right elbow pain, erythema, warmth without any evidence of prior trauma  Patient unaware of any previous injury  8  Other:  Diabetes mellitus type 1 per primary team, history of CVA    HEMODIALYSIS PROCEDURE NOTE  The patient was seen and examined on hemodialysis  Time:  3 5 hours  Sodium:  130 Blood flow:  400   Dialyzer: F160 Potassium:  2 Dialysate flow:  800   Access:   Av fistula Bicarbonate:  35 Ultrafiltration goal:  2 L   Medications on HD:  Epogen-held at this time, hemoglobin above goal     DISPOSITION:  Stable from a renal standpoint    SUBJECTIVE / 24H INTERVAL HISTORY:  Continues to complain of pain and immobility right elbow    OBJECTIVE:  Current Weight: Weight - Scale: 96 4 kg (212 lb 8 4 oz)  Vitals:    04/01/21 2214 04/02/21 0700 04/02/21 0900 04/02/21 0930   BP: 163/91 151/71 160/81 (!) 179/94   BP Location:  Right arm Right arm Right arm   Pulse: 78 78 68 69   Resp: 18 18 16 16   Temp: 98 8 °F (37 1 °C) 98 8 °F (37 1 °C) 98 8 °F (37 1 °C)    TempSrc:  Oral Oral    SpO2: 96% 97%     Weight:       Height:           Intake/Output Summary (Last 24 hours) at 4/2/2021 0947  Last data filed at 4/2/2021 0900  Gross per 24 hour   Intake 1330 ml   Output 200 ml   Net 1130 ml     General: NAD  Skin: no rash  Eyes: anicteric sclera  ENT: moist mucous membrane  Neck: supple  Chest: CTA b/l, no ronchii, no wheeze, no rubs, no rales  CVS: s1s2, no murmur, no gallop, no rub  Abdomen: soft, nontender, nl sounds  Extremities: no edema LE b/l  Right elbow very slight erythema and edema    : no [place]  Neuro: AAOX3  Psych: normal affect  Medications:    Current Facility-Administered Medications:     acetaminophen (TYLENOL) oral suspension 650 mg, 650 mg, Oral, Q6H PRN, Ekaterina Ohara PA-C, 649 6 mg at 04/01/21 2326    aspirin chewable tablet 81 mg, 81 mg, Oral, Daily, Sudarshan Womack PA-C, Stopped at 04/02/21 2842    atorvastatin (LIPITOR) tablet 40 mg, 40 mg, Oral, QPM, Sudarshan Womack PA-C, 40 mg at 04/01/21 1755    ceFAZolin (ANCEF) 500 mg in sodium chloride 0 9 % 50 mL IVPB, 500 mg, Intravenous, Once per day on Mon Wed Fri, Patricia Mora MD, Last Rate: 100 mL/hr at 04/01/21 1210, 500 mg at 04/01/21 1210    cholecalciferol (VITAMIN D3) tablet 1,000 Units, 1,000 Units, Oral, Daily, Sudarshan Womack PA-C, Stopped at 04/02/21 9493    cinacalcet (SENSIPAR) tablet 60 mg, 60 mg, Oral, Every Other Day, Timmy Leblanc MD    cloNIDine (CATAPRES-TTS-3) 0 3 mg/24 hr TD weekly patch, 0 3 mg, Transdermal, Weekly, DEISI Arellano, Stopped at 04/01/21 1239    doxazosin (CARDURA) tablet 2 mg, 2 mg, Oral, Daily, Timmy Leblanc MD, 2 mg at 04/01/21 1638    doxazosin (CARDURA) tablet 8 mg, 8 mg, Oral, HS, Sudarshan Womack PA-C, 8 mg at 04/01/21 2107    escitalopram (LEXAPRO) tablet 10 mg, 10 mg, Oral, Daily, Moreno Stark SAV Womack, Stopped at 04/02/21 0929    famotidine (PEPCID) tablet 40 mg, 40 mg, Oral, HS PRN, Mello Dent PA-C    gabapentin (NEURONTIN) capsule 200 mg, 200 mg, Oral, HS, Sudarshan Womack PA-C, 200 mg at 04/01/21 2108    hydrALAZINE (APRESOLINE) tablet 50 mg, 50 mg, Oral, TID, Rafael Blackwell MD, Stopped at 04/02/21 0929    insulin glargine (LANTUS) subcutaneous injection 6 Units 0 06 mL, 6 Units, Subcutaneous, HS, Sudarshan Womack PA-C, 6 Units at 04/01/21 2110    insulin lispro (HumaLOG) 100 units/mL subcutaneous injection 1-5 Units, 1-5 Units, Subcutaneous, HS, Sudarshan Womack PA-C, 1 Units at 04/01/21 2110    insulin lispro (HumaLOG) 100 units/mL subcutaneous injection 1-6 Units, 1-6 Units, Subcutaneous, TID AC, Stopped at 04/02/21 0929 **AND** Fingerstick Glucose (POCT), , , TID AC, Sudarshan Womack PA-C    insulin lispro (HumaLOG) 100 units/mL subcutaneous injection 4 Units, 4 Units, Subcutaneous, TID With Meals, Mello Dent PA-C, Stopped at 04/02/21 0930    labetalol (NORMODYNE) tablet 300 mg, 300 mg, Oral, BID, Timmy Leblanc MD, Stopped at 04/02/21 0930    Labetalol HCl (NORMODYNE) injection 10 mg, 10 mg, Intravenous, Q6H PRN, Timmy Leblanc MD    lisinopril (ZESTRIL) tablet 20 mg, 20 mg, Oral, BID, Sudarshan Womack PA-C, Stopped at 04/02/21 0930    metolazone (ZAROXOLYN) tablet 10 mg, 10 mg, Oral, Daily Before Lunch, Timmy Leblanc MD    minoxidil (LONITEN) tablet 1 25 mg, 1 25 mg, Oral, Once per day on Mon Wed Fri, DEISI Wilson    minoxidil (LONITEN) tablet 10 mg, 10 mg, Oral, Daily, DEISI Drew, Stopped at 04/02/21 0930    multivitamin stress formula tablet 1 tablet, 1 tablet, Oral, Daily Before Lunch, Sudarshan Womack PA-C, 1 tablet at 04/01/21 1208    NIFEdipine (PROCARDIA XL) 24 hr tablet 60 mg, 60 mg, Oral, BID, Sudarshan Womack PA-C, Stopped at 04/02/21 0930    ondansetron (ZOFRAN-ODT) dispersible tablet 4 mg, 4 mg, Oral, Q6H PRN, MD Christine Joseph saccharomyces boulardii (FLORASTOR) capsule 250 mg, 250 mg, Oral, BID, Sudarshan Womack PA-C, Stopped at 04/02/21 0930    torsemide (DEMADEX) tablet 100 mg, 100 mg, Oral, BID before lunch/dinner, Boris Robertson MD, 100 mg at 04/01/21 1638    Laboratory Results:  Results from last 7 days   Lab Units 04/02/21  0808 04/01/21  0521 03/31/21  1651   WBC Thousand/uL 6 87 7 87 6 36   HEMOGLOBIN g/dL 12 6 12 7 12 7   HEMATOCRIT % 39 1 39 8 40 8   PLATELETS Thousands/uL 244 245 265   POTASSIUM mmol/L  --  4 4 5 3   CHLORIDE mmol/L  --  103 98*   CO2 mmol/L  --  19* 30   BUN mg/dL  --  32* 22   CREATININE mg/dL  --  8 32* 6 60*   CALCIUM mg/dL  --  9 0 8 9

## 2021-04-02 NOTE — ASSESSMENT & PLAN NOTE
Patient previously on Peritoneal dialysis but had recurrent peritonitis thus changed to HD in December  HD M/W/F  Will get HD today  Plan   · Continue HD, nephrology on board  · Follow BMP, monitor cr and lytes     (P) [phone]

## 2021-04-02 NOTE — DISCHARGE INSTR - AVS FIRST PAGE
Dear Nura Burns,     It was our pleasure to care for you here at [facility]  It is our hope that we were always able to exceed the expected standards for your care during your stay  You were hospitalized due to right elbow pain with suspected cellulitis  You were cared for on the [address] [place] [address] fourth floor by Alessia Lyons MD under the service of Aleja Jackson MD with the [facility] who covers for your primary care physician (PCP), Emile Delgado DO, while you were hospitalized  If you have any questions or concerns related to this hospitalization, you may contact us at [phone]  For follow up as well as any medication refills, we recommend that you follow up with your primary care physician  A registered nurse will reach out to you by phone within a few days after your discharge to answer any additional questions that you may have after going home  However, at this time we provide for you here, the most important instructions / recommendations at discharge:     · Notable Medication Adjustments -   · Increased labetalol to 400 mg twice daily (for better blood pressure control)  · Antibiotics: keflex 500 mg on dialysis days after dialysis for a total of 3 doses  Also take Doxycycline 100 mg bid for 5 days  · Testing Required after Discharge -   · None   · Important follow up information -   · Follow up with PCP within 1 week of discharge  · Continue following with Nephrology as previously scheduled  · Other Instructions -   · If your symptoms get worse please return to the hospital for IV antibiotics  · Go to the nearest emergency room if your symptoms worsen/fail to improve  When in doubt call your PCP for guidance  · It was a pleasure to meet you  Take care!   · Please review this entire after visit summary as additional general instructions including medication list, appointments, activity, diet, any pertinent wound care, and other additional recommendations from your care team that may be provided for you        Sincerely,     Rafael Blackwell MD

## 2021-04-02 NOTE — PROGRESS NOTES
Vancomycin Assessment    Hannah Hanna is a 38 y o  male who is currently receiving vancomycin 20 mg/kg/dose load followed by 10mg/kg/dose after each dialysis session for other Bone/joint infection   Relevant clinical data and objective history reviewed:  Creatinine   Date Value Ref Range Status   04/02/2021 11 29 (H) 0 60 - 1 30 mg/dL Final     Comment:     Standardized to IDMS reference method   04/01/2021 8 32 (H) 0 60 - 1 30 mg/dL Final     Comment:     Standardized to IDMS reference method   03/31/2021 6 60 (H) 0 60 - 1 30 mg/dL Final     Comment:     Standardized to IDMS reference method   11/03/2015 1 90 (H) 0 60 - 1 30 mg/dL Final     Comment:     Standardized to IDMS reference method   10/28/2015 2 37 (H) 0 60 - 1 30 mg/dL Final     Comment:     Standardized to IDMS reference method   10/27/2015 2 44 (H) 0 60 - 1 30 mg/dL Final     Comment:     Standardized to IDMS reference method     Vancomycin Rm   Date Value Ref Range Status   11/17/2020 22 3 ug/mL Final     /91 (BP Location: Right arm)   Pulse 70   Temp 98 8 °F (37 1 °C) (Oral)   Resp 16   Ht 5' 11" (1 803 m)   Wt 96 4 kg (212 lb 8 4 oz)   SpO2 97%   BMI 29 64 kg/m²   I/O last 3 completed shifts:   In: 2160 [P O :1560; IV Piggyback:600]  Out: 650 [Urine:650]  Lab Results   Component Value Date/Time    BUN 50 (H) 04/02/2021 08:08 AM    BUN 34 (H) 11/03/2015 11:32 AM    WBC 6 87 04/02/2021 08:08 AM    WBC 8 54 10/28/2015 05:43 PM    HGB 12 6 04/02/2021 08:08 AM    HGB 11 6 (L) 10/28/2015 05:43 PM    HCT 39 1 04/02/2021 08:08 AM    HCT 32 1 (L) 10/28/2015 05:43 PM    MCV 98 04/02/2021 08:08 AM    MCV 83 10/28/2015 05:43 PM     04/02/2021 08:08 AM     10/28/2015 05:43 PM     Temp Readings from Last 3 Encounters:   04/02/21 98 8 °F (37 1 °C) (Oral)   02/23/21 97 8 °F (36 6 °C) (Temporal)   02/18/21 98 5 °F (36 9 °C) (Oral)     Vancomycin Days of Therapy: 1    Assessment/Plan  The patient is currently on vancomycin utilizing scheduled dosing based on actual body weight  Baseline risks associated with therapy include: pre-existing renal impairment and concomitant nephrotoxic medications  The patient is currently receiving 20 mg/kg/dose load followed by 10mg/kg/dose after each dialysis session and is clinically appropriate and dose will be continued  Pharmacy will also follow closely for s/sx of nephrotoxicity, infusion reactions, and appropriateness of therapy  BMP and CBC will be ordered per protocol  Patient is on Mon,Wed and Friday HD schedule  Gave him a 20mg/kg load after dialysis today  Will check the pre-HD random level on Wednesday  Goal of 20-25 equates to 15-20 post HD  Due to infection severity, will target a trough of 15-20 (appropriate for most indications)   Pharmacy will continue to follow the patients culture results and clinical progress daily      Gurdeep Colón, Pharmacist

## 2021-04-02 NOTE — CASE MANAGEMENT
LOS: 2  Readmission: none  Bundle: no  Unplanned Readmission Risk: 41 (red)    Patient admitted due to elbow pain  Met with patient at bedside; father also present  Patient presents as alert, oriented x4  Introduced self and explained role of case management  Patient states that he lives at home with parents in [facility]  [facility] M[facility] 10:30am chair time; family transports  Patient has a cane he uses for ambulation as needed  States that he's had history of home care services following previous hospitalizations, but does not feel same is needed at this time  Patient ambulating halls independently  Remains on IV abx and ortho following for pain in elbow  Patient's family to transport home once medically stable  No needs currently identified       KAVITHA Martinez  4/2/2021  6:04 PM

## 2021-04-02 NOTE — DISCHARGE SUMMARY
[facility]  Discharge- Tiffani Winter 1985, 38 y o  male MRN: [id number]  Unit/Bed#: W -01 Encounter: [id number]  Primary Care Provider: Kavon Reeves DO   Date and time admitted to hospital: 3/31/2021  3:26 PM    * Elbow pain, right  Assessment & Plan  2/2 cellulitis vs less likely bursitis vs unlikely septic arthritis   POA: Worsening swelling and pain in right elbow x4d  +limited ROM  · Patient states that he does ambulate with a cane with his right arm has been doing so for approximately 3 years, and does state that he newly started hemodialysis a couple of months ago and he does rest his right olecranon on the hemodialysis chair  · Was started on ceftriaxone and vancomycin in the emergency department possible septic bursitis  · CRP mildly elevated   · This am: warm though improved  +limited ROM (extension of elbow), this has improved as well  Swelling decreased  · Cleared from nephrology and ortho standpoint for dc  OP f/u with nephrology  Plan: Medically stable for dc  Advised pt to return to ED if sxs return/worsen  · Ortho consulted - no effusion, unlikely septic arthritis, more likely cellulitis  · Continue PO keflex 500mg on dialysis days x 3 days + doxycycline 100 bid x5d (for MRSA coverage, MRSA swab pending)  · F/u with pcp within 1 week of dc    End stage renal disease on dialysis due to type 1 diabetes mellitus [facility])  Assessment & Plan  Patient previously on Peritoneal dialysis but had recurrent peritonitis thus changed to HD in December  HD M/W/F  Will get HD tomorrow  Nephrology following, appreciate input  Plan   · F/u with nephrology as scheduled  (P) 186  4    Renovascular hypertension  Assessment & Plan  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)  Assessment & Plan  Lab Results   Component Value Date    HGBA1C 6 9 (H) 11/13/2020       Recent Labs     04/03/21  1606 04/03/21  2100 04/04/21  0738 04/04/21  1109   POCGLU 197* 231* 162* 155*       Blood Sugar Average: Last 72 hrs:  BG at target (140-180)  · Continue home insulin regimen   · F/u with pcp  (P) 186  4      Discharging Resident Physician: Nathen Rojas MD  Attending: No att  providers found  PCP: Patricia Hager DO  Admission Date: 3/31/2021  Discharge Date: 04/04/21    Disposition:     Home    Reason for Admission: Elbow pain, right    Consultations During Hospital Stay:  ·  orthopedics   · Nephrology     Procedures Performed:     · HD     Significant Findings / Test Results:     · Soft tissue swelling on XR right elbow     Incidental Findings:   · None      Test Results Pending at Discharge (will require follow up): · None      Outpatient Tests Requested:  · None     Complications:  none    Hospital Course: Benigno Vega is a 38 y o  male patient who originally presented to the hospital on 3/31/2021 due to right elbow pain and limited extension  Orthopedics were consulted and felt that this was unlikely septic joint as there was no effusion, suspected cellulitis  Pt was started on IV Ancef 500 mg 3x/wk (renally dosed)  Nephrology was also consulted as pt is on HD and received HD while inpatient  Pt's symptoms improved and he was dc home on abx with instructions to return to hospital if sxs worsen  He was agreeable  He was deemed medically stable for dc       Condition at Discharge: fair     Discharge Day Visit / Exam:     Subjective:  Right elbow pain has improved as has the ROM  No fevers or night sweats  Would like to go home  No other complaints at this time  Vitals: Blood Pressure: 157/83 (04/04/21 1117)  Pulse: 76 (04/04/21 1117)  Temperature: 98 1 °F (36 7 °C) (04/04/21 0736)  Temp Source: Oral (04/04/21 0736)  Respirations: 14 (04/04/21 0736)  Height: 5' 11" (180 3 cm) (04/02/21 1000)  Weight - Scale: 96 4 kg (212 lb 8 4 oz) (04/02/21 1000)  SpO2: 94 % (04/04/21 0736)  Exam:     Physical Exam  Vitals signs and nursing note reviewed  Constitutional:       General: He is not in acute distress  Appearance: He is ill-appearing (chronically)  He is not toxic-appearing or diaphoretic  Eyes:      Extraocular Movements: Extraocular movements intact  Pupils: Pupils are equal, round, and reactive to light  Cardiovascular:      Rate and Rhythm: Normal rate and regular rhythm  Pulses: Normal pulses  Heart sounds: Normal heart sounds  Pulmonary:      Effort: Pulmonary effort is normal       Breath sounds: Normal breath sounds  Abdominal:      General: Bowel sounds are normal       Palpations: Abdomen is soft  Musculoskeletal:         General: Swelling (right elbow, improved) present  Right elbow: He exhibits decreased range of motion (improved) and swelling (improved)  He exhibits no effusion  Tenderness (improved) found  Right lower leg: No edema  Left lower leg: No edema  Skin:     Capillary Refill: Capillary refill takes less than 2 seconds  Findings: No erythema  Neurological:      Mental Status: He is alert and oriented to person, place, and time  Psychiatric:         Mood and Affect: Mood normal       Comments: Pleasant affect         Discussion with Family: Pt will update family  Discharge instructions/Information to patient and family:   See after visit summary for information provided to patient and family        Provisions for Follow-Up Care:  See after visit summary for information related to follow-up care and any pertinent home health orders  Planned Readmission: none     Discharge Medications:  See after visit summary for reconciled discharge medications provided to patient and family        ** Please Note: This note has been constructed using a voice recognition system **

## 2021-04-02 NOTE — ASSESSMENT & PLAN NOTE
Lab Results   Component Value Date    HGBA1C 6 9 (H) 11/13/2020       Recent Labs     04/01/21  1557 04/01/21  2109 04/02/21  0725 04/02/21  1101   POCGLU 150* 196* 200* 181*       Blood Sugar Average: Last 72 hrs:    · Continue home insulin regimen   · lantus 6 units HS   · humalog 4 units TID   · Continue SSI for coverage   · accuchecks   · hypoglycemia protocol   (P) 165 [id number]

## 2021-04-02 NOTE — DISCHARGE INSTRUCTIONS
Cellulitis   WHAT YOU NEED TO KNOW:   Cellulitis is a skin infection caused by bacteria  Cellulitis may go away on its own or you may need treatment  Your healthcare provider may draw a [place] around the outside edges of your cellulitis  If your cellulitis spreads, your healthcare provider will see it outside of the [place]  DISCHARGE INSTRUCTIONS:   Call 911 if:   · You have sudden trouble breathing or chest pain  Seek care immediately if:   · Your wound gets larger and more painful  · You feel a crackling under your skin when you touch it  · You have purple dots or bumps on your skin, or you see bleeding under your skin  · You have new swelling and pain in your legs  · The red, warm, swollen area gets larger  · You see red streaks coming from the infected area  Contact your healthcare provider if:   · You have a fever  · Your fever or pain does not go away or gets worse  · The area does not get smaller after 2 days of antibiotics  · Your skin is flaking or peeling off  · You have questions or concerns about your condition or care  Medicines:   · Antibiotics  help treat the bacterial infection  · NSAIDs , such as ibuprofen, help decrease swelling, pain, and fever  NSAIDs can cause stomach bleeding or kidney problems in certain people  If you take blood thinner medicine, always ask if NSAIDs are safe for you  Always read the medicine label and follow directions  Do not give these medicines to children under 10months of age without direction from your child's healthcare provider  · Acetaminophen  decreases pain and fever  It is available without a doctor's order  Ask how much to take and how often to take it  Follow directions  Read the labels of all other medicines you are using to see if they also contain acetaminophen, or ask your doctor or pharmacist  Acetaminophen can cause liver damage if not taken correctly   Do not use more than 4 grams (4,000 milligrams) total of acetaminophen in one day  · Take your medicine as directed  Contact your healthcare provider if you think your medicine is not helping or if you have side effects  Tell him or her if you are allergic to any medicine  Keep a list of the medicines, vitamins, and herbs you take  Include the amounts, and when and why you take them  Bring the list or the pill bottles to follow-up visits  Carry your medicine list with you in case of an emergency  Self-care:   · Elevate the area above the level of your heart  as often as you can  This will help decrease swelling and pain  Prop the area on pillows or blankets to keep it elevated comfortably  · Clean the area daily until the wound scabs over  Gently wash the area with soap and water  Pat dry  Use dressings as directed  · Place cool or warm, wet cloths on the area as directed  Use clean cloths and clean water  Leave it on the area until the cloth is room temperature  Pat the area dry with a clean, dry cloth  The cloths may help decrease pain  Prevent cellulitis:   · Do not scratch bug bites or areas of injury  You increase your risk for cellulitis by scratching these areas  · Do not share personal items, such as towels, clothing, and razors  · Clean exercise equipment  with germ-killing  before and after you use it  · Wash your hands often  Use soap and water  Wash your hands after you use the bathroom, change a child's diapers, or sneeze  Wash your hands before you prepare or eat food  Use lotion to prevent dry, cracked skin  · Wear pressure stockings as directed  You may be told to wear the stockings if you have peripheral edema  The stockings improve blood flow and decrease swelling  · Treat athlete's foot  This can help prevent the spread of a bacterial skin infection  Follow up with your healthcare provider within 3 days, or as directed:   Your healthcare provider will check if your cellulitis is getting better  You may need different medicine  Write down your questions so you remember to ask them during your visits  © Copyright [address] Information is for End User's use only and may not be sold, redistributed or otherwise used for commercial purposes  All illustrations and images included in CareNotes® are the copyrighted property of A D A M , Inc  or Radha Mckeon  The above information is an  only  It is not intended as medical advice for individual conditions or treatments  Talk to your doctor, nurse or pharmacist before following any medical regimen to see if it is safe and effective for you

## 2021-04-02 NOTE — HEMODIALYSIS
Patient tolerated treatment well, EDW is 97 5kg and his pre-tx weight was 98 5kg  Aside from some arm discomfort patient denies any complaints  Treatment was provided following HD orders, 3K+ bath was used for a serum potassium of 4 4 mEq  Net volume removal was 1,500mL, tx time was 3 5 hours  SBP remained between 150-170s, BP meds given after dialysis  Post-tx weight 97 0 via standing scale

## 2021-04-02 NOTE — PROGRESS NOTES
[facility]  Progress Note Shawn Muñiz 1985, 38 y o  male MRN: [id number]  Unit/Bed#: W -01 Encounter: [id number]  Primary Care Provider: Radha Duque DO   Date and time admitted to hospital: 3/31/2021  3:26 PM    * Elbow pain, right  Assessment & Plan  2/2 cellulitis vs less likely bursitis vs unlikely septic arthritis   POA: Worsening swelling and pain in right elbow x4d  +limited ROM  · Patient states that he does ambulate with a cane with his right arm has been doing so for approximately 3 years, and does state that he newly started hemodialysis a couple of months ago and he does rest his right olecranon on the hemodialysis chair  · Was started on ceftriaxone and vancomycin in the emergency department possible septic bursitis  · CRP mildly elevated   · Today pain worse this am though appears less erythematous  Still warm  +limited ROM (extension of elbow)    Plan:   · Ortho consulted - no effusion, unlikely septic arthritis, more likely cellulitis  Recommend Abx  · Continue IV keflex 500mg (3x weekly per pharmacy)  If requires them OP will touch base with nephro so can be given with HD at dialysis center  · Monitor temps, wbc  · Ice and elevation    Potential DC tomorrow tentative on continued clinical improvement  End stage renal disease on dialysis due to type 1 diabetes mellitus 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  BP acceptable today  Plan:   · Monitor blood pressure  · Continue home meds including:  · Cardura 2 mg daily  · Clonidine patch 0 3 mg  · Hydralazine 50 mg t i d  · Labetalol 300 mg b i d  Anat Lunyd   · Lisinopril 20 mg b  i  d  Beverlyn Frisk · Nifedipine 60 mg b i d  Beverlyn Frisk · Torsemide 100 mg b i d  Beverlyn Frisk · Metallic zone 10 mg daily  History of lacunar cerebrovascular accident (CVA)  Assessment & Plan  · Continue ASA, statin    Type 1 diabetes mellitus [facility])  Assessment & Plan  Lab Results   Component Value Date    HGBA1C 6 9 (H) 2020       Recent Labs     21  1557 21  2109 21  0725 21  1101   POCGLU 150* 196* 200* 181*       Blood Sugar Average: Last 72 hrs:    · Continue home insulin regimen   · lantus 6 units HS   · humalog 4 units TID   · Continue SSI for coverage   · accuchecks   · hypoglycemia protocol   (P) 509 [id number]      VTE Pharmacologic Prophylaxis:   Pharmacologic: < 40, VTE risk score 2  Mechanical VTE Prophylaxis in Place: No    Discussions with Specialists or Other Care Team Provider: attending physician    Education and Discussions with Family / Patient: discussed plan with pt and he is agreeable    Current Length of Stay: 1 day(s)    Current Patient Status: Inpatient     Discharge Plan / Estimated Discharge Date: 24 hrs  Will DC home, tentative on clinical improvement  Code Status: Level 1 - Full Code      Subjective:   Subjectively worsening, increased pain though unsure if he bumped it while sleeping at night  No fever, night sweats  ROS is otherwise negative  Objective:     Vitals:   Temp (24hrs), Av 8 °F (37 1 °C), Min:98 8 °F (37 1 °C), Max:98 8 °F (37 1 °C)    Temp:  [98 8 °F (37 1 °C)] 98 8 °F (37 1 °C)  HR:  [68-79] 70  Resp:  [16-20] 16  BP: (151-182)/() 168/91  SpO2:  [96 %-97 %] 97 %  Body mass index is 29 64 kg/m²  Input and Output Summary (last 24 hours): Intake/Output Summary (Last 24 hours) at 2021 1500  Last data filed at 2021 1451  Gross per 24 hour   Intake 1655 83 ml   Output 200 ml   Net 1455 83 ml         Physical Exam  Vitals signs and nursing note reviewed  Constitutional:       General: He is not in acute distress       Appearance: He is ill-appearing (chronically)  He is not toxic-appearing or diaphoretic  HENT:      Mouth/Throat:      Mouth: Mucous membranes are moist    Eyes:      Extraocular Movements: Extraocular movements intact  Pupils: Pupils are equal, round, and reactive to light  Neck:      Musculoskeletal: Neck supple  Cardiovascular:      Rate and Rhythm: Normal rate and regular rhythm  Pulses: Normal pulses  Heart sounds: Normal heart sounds  Pulmonary:      Effort: Pulmonary effort is normal       Breath sounds: Normal breath sounds  Abdominal:      General: Bowel sounds are normal       Palpations: Abdomen is soft  Musculoskeletal:      Right shoulder: He exhibits decreased range of motion (limited extension), swelling and pain  He exhibits no effusion  Arms:       Right lower leg: No edema  Left lower leg: No edema  Skin:     General: Skin is warm  Capillary Refill: Capillary refill takes less than 2 seconds  Findings: Erythema (improving) present  Neurological:      Mental Status: He is alert and oriented to person, place, and time  Additional Data:     Labs:    Results from last 7 days   Lab Units 04/02/21  0808   WBC Thousand/uL 6 87   HEMOGLOBIN g/dL 12 6   HEMATOCRIT % 39 1   PLATELETS Thousands/uL 244   NEUTROS PCT % 62   LYMPHS PCT % 15   MONOS PCT % 10   EOS PCT % 11*     Results from last 7 days   Lab Units 04/02/21  0808  03/31/21  1651   POTASSIUM mmol/L 6 0*   < > 5 3   CHLORIDE mmol/L 101   < > 98*   CO2 mmol/L 21   < > 30   BUN mg/dL 50*   < > 22   CREATININE mg/dL 11 29*   < > 6 60*   CALCIUM mg/dL 8 9   < > 8 9   ALK PHOS U/L  --   --  89   ALT U/L  --   --  14   AST U/L  --   --  15    < > = values in this interval not displayed  * I Have Reviewed All Lab Data Listed Above  * Additional Pertinent Lab Tests Reviewed:  All Labs Within Last 24 Hours Reviewed    Imaging:    Imaging Reports Reviewed Today Include: none  Imaging Personally Reviewed by Myself Includes: none    Recent Cultures (last 7 days):     Results from last 7 days   Lab Units 03/31/21  1654 03/31/21  1651   BLOOD CULTURE  No Growth at 24 hrs  No Growth at 24 hrs         Last 24 Hours Medication List:   Current Facility-Administered Medications   Medication Dose Route Frequency Provider Last Rate    acetaminophen  650 mg Oral Q6H PRN Yolande Blake PA-C      aspirin  81 mg Oral Daily Sudarshan Womack PA-C      atorvastatin  40 mg Oral QPM Sudarshan Womack PA-C      cefazolin  500 mg Intravenous Once per day on Mon Wed Fri Raheem Bruno MD Stopped (04/02/21 1304)    cholecalciferol  1,000 Units Oral Daily Sudarshan Womack PA-C      cinacalcet  60 mg Oral Every Other Day Hesham Herrmann MD      cloNIDine  0 3 mg Transdermal Weekly DEISI Gtz      doxazosin  2 mg Oral Daily Timmy Ochoa MD      doxazosin  8 mg Oral HS Sudarshan Womack PA-C      escitalopram  10 mg Oral Daily Sudarshan Womack PA-C      famotidine  40 mg Oral HS PRN Yolande Blake PA-C      gabapentin  200 mg Oral HS Sudarshan Womack PA-C      hydrALAZINE  50 mg Oral TID Hesham Herrmann MD      insulin glargine  6 Units Subcutaneous HS Sudarshan Womack PA-C      insulin lispro  1-5 Units Subcutaneous HS Sudarshan Womack PA-C      insulin lispro  1-6 Units Subcutaneous TID AC Sudarshan Womack PA-C      insulin lispro  4 Units Subcutaneous TID With Meals Sudarshan Womack PA-C      labetalol  300 mg Oral BID Timmy Leblanc MD      Labetalol HCl  10 mg Intravenous Q6H PRN Timmy Leblanc MD      lisinopril  20 mg Oral BID Sudarshan Womack PA-C      metolazone  10 mg Oral Daily Before Lunch Timmy Leblanc MD      minoxidil  1 25 mg Oral Once per day on Mon Wed Fri DEISI Gtz      minoxidil  10 mg Oral Daily DEISI Gtz      multivitamin stress formula  1 tablet Oral Daily Before Lunch Sudarshan Womack PA-C      NIFEdipine ER  60 mg Oral BID Sudarshan Womack PA-C      ondansetron  4 mg Oral Q6H PRN Timmy Leblanc MD      saccharomyces boulardii  250 mg Oral BID Mariana Harding PA-C      torsemide  100 mg Oral BID before lunch/dinner Timmy Hull MD      vancomycin  10 mg/kg Intravenous After Dialysis Stacy Mccracken MD          Today, Patient Was Seen By: Jose Enrique Pickard MD    ** Please Note: This note has been constructed using a voice recognition system   **

## 2021-04-03 LAB
ANION GAP SERPL CALCULATED.3IONS-SCNC: 16 MMOL/L (ref 4–13)
BUN SERPL-MCNC: 41 MG/DL (ref 5–25)
CALCIUM SERPL-MCNC: 8.9 MG/DL (ref 8.3–10.1)
CHLORIDE SERPL-SCNC: 97 MMOL/L (ref 100–108)
CO2 SERPL-SCNC: 26 MMOL/L (ref 21–32)
CREAT SERPL-MCNC: 8.42 MG/DL (ref 0.6–1.3)
ERYTHROCYTE [DISTWIDTH] IN BLOOD BY AUTOMATED COUNT: 14.6 % (ref 11.6–15.1)
GFR SERPL CREATININE-BSD FRML MDRD: 7 ML/MIN/1.73SQ M
GLUCOSE SERPL-MCNC: 197 MG/DL (ref 65–140)
GLUCOSE SERPL-MCNC: 227 MG/DL (ref 65–140)
GLUCOSE SERPL-MCNC: 231 MG/DL (ref 65–140)
GLUCOSE SERPL-MCNC: 255 MG/DL (ref 65–140)
GLUCOSE SERPL-MCNC: 269 MG/DL (ref 65–140)
GLUCOSE SERPL-MCNC: 324 MG/DL (ref 65–140)
HCT VFR BLD AUTO: 41.8 % (ref 36.5–49.3)
HGB BLD-MCNC: 13.7 G/DL (ref 12–17)
MCH RBC QN AUTO: 31.9 PG (ref 26.8–34.3)
MCHC RBC AUTO-ENTMCNC: 32.8 G/DL (ref 31.4–37.4)
MCV RBC AUTO: 97 FL (ref 82–98)
PLATELET # BLD AUTO: 262 THOUSANDS/UL (ref 149–390)
PMV BLD AUTO: 9.9 FL (ref 8.9–12.7)
POTASSIUM SERPL-SCNC: 4.8 MMOL/L (ref 3.5–5.3)
RBC # BLD AUTO: 4.3 MILLION/UL (ref 3.88–5.62)
SODIUM SERPL-SCNC: 139 MMOL/L (ref 136–145)
WBC # BLD AUTO: 5.74 THOUSAND/UL (ref 4.31–10.16)

## 2021-04-03 PROCEDURE — 80048 BASIC METABOLIC PNL TOTAL CA: CPT | Performed by: FAMILY MEDICINE

## 2021-04-03 PROCEDURE — 85027 COMPLETE CBC AUTOMATED: CPT | Performed by: FAMILY MEDICINE

## 2021-04-03 PROCEDURE — 99232 SBSQ HOSP IP/OBS MODERATE 35: CPT | Performed by: FAMILY MEDICINE

## 2021-04-03 PROCEDURE — 99231 SBSQ HOSP IP/OBS SF/LOW 25: CPT | Performed by: PHYSICIAN ASSISTANT

## 2021-04-03 PROCEDURE — 87081 CULTURE SCREEN ONLY: CPT | Performed by: FAMILY MEDICINE

## 2021-04-03 PROCEDURE — 82948 REAGENT STRIP/BLOOD GLUCOSE: CPT

## 2021-04-03 RX ADMIN — DOXAZOSIN 8 MG: 4 TABLET ORAL at 21:42

## 2021-04-03 RX ADMIN — HYDRALAZINE HYDROCHLORIDE 50 MG: 25 TABLET, FILM COATED ORAL at 20:27

## 2021-04-03 RX ADMIN — GABAPENTIN 200 MG: 100 CAPSULE ORAL at 21:41

## 2021-04-03 RX ADMIN — INSULIN LISPRO 2 UNITS: 100 INJECTION, SOLUTION INTRAVENOUS; SUBCUTANEOUS at 17:17

## 2021-04-03 RX ADMIN — Medication 1000 UNITS: at 08:42

## 2021-04-03 RX ADMIN — NIFEDIPINE 60 MG: 30 TABLET, FILM COATED, EXTENDED RELEASE ORAL at 08:43

## 2021-04-03 RX ADMIN — LISINOPRIL 20 MG: 20 TABLET ORAL at 08:42

## 2021-04-03 RX ADMIN — INSULIN LISPRO 2 UNITS: 100 INJECTION, SOLUTION INTRAVENOUS; SUBCUTANEOUS at 21:41

## 2021-04-03 RX ADMIN — ATORVASTATIN CALCIUM 40 MG: 40 TABLET, FILM COATED ORAL at 17:18

## 2021-04-03 RX ADMIN — LABETALOL HYDROCHLORIDE 300 MG: 100 TABLET ORAL at 17:17

## 2021-04-03 RX ADMIN — ONDANSETRON 4 MG: 4 TABLET, ORALLY DISINTEGRATING ORAL at 05:23

## 2021-04-03 RX ADMIN — INSULIN LISPRO 4 UNITS: 100 INJECTION, SOLUTION INTRAVENOUS; SUBCUTANEOUS at 11:53

## 2021-04-03 RX ADMIN — MINOXIDIL 10 MG: 2.5 TABLET ORAL at 08:43

## 2021-04-03 RX ADMIN — ACETAMINOPHEN 650 MG: 650 SUSPENSION ORAL at 08:42

## 2021-04-03 RX ADMIN — HYDRALAZINE HYDROCHLORIDE 50 MG: 25 TABLET, FILM COATED ORAL at 17:18

## 2021-04-03 RX ADMIN — DOXAZOSIN 2 MG: 1 TABLET ORAL at 17:17

## 2021-04-03 RX ADMIN — LABETALOL HYDROCHLORIDE 300 MG: 100 TABLET ORAL at 08:42

## 2021-04-03 RX ADMIN — ZINC 1 TABLET: TAB ORAL at 11:53

## 2021-04-03 RX ADMIN — ASPIRIN 81 MG CHEWABLE TABLET 81 MG: 81 TABLET CHEWABLE at 08:42

## 2021-04-03 RX ADMIN — TORSEMIDE 100 MG: 100 TABLET ORAL at 11:52

## 2021-04-03 RX ADMIN — INSULIN LISPRO 2 UNITS: 100 INJECTION, SOLUTION INTRAVENOUS; SUBCUTANEOUS at 11:52

## 2021-04-03 RX ADMIN — Medication 250 MG: at 17:17

## 2021-04-03 RX ADMIN — METOLAZONE 10 MG: 5 TABLET ORAL at 11:52

## 2021-04-03 RX ADMIN — INSULIN LISPRO 3 UNITS: 100 INJECTION, SOLUTION INTRAVENOUS; SUBCUTANEOUS at 08:43

## 2021-04-03 RX ADMIN — HYDRALAZINE HYDROCHLORIDE 50 MG: 25 TABLET, FILM COATED ORAL at 08:42

## 2021-04-03 RX ADMIN — INSULIN LISPRO 4 UNITS: 100 INJECTION, SOLUTION INTRAVENOUS; SUBCUTANEOUS at 08:45

## 2021-04-03 RX ADMIN — INSULIN LISPRO 4 UNITS: 100 INJECTION, SOLUTION INTRAVENOUS; SUBCUTANEOUS at 17:18

## 2021-04-03 RX ADMIN — TORSEMIDE 100 MG: 100 TABLET ORAL at 17:20

## 2021-04-03 RX ADMIN — INSULIN GLARGINE 6 UNITS: 100 INJECTION, SOLUTION SUBCUTANEOUS at 21:40

## 2021-04-03 RX ADMIN — Medication 250 MG: at 08:43

## 2021-04-03 RX ADMIN — NIFEDIPINE 60 MG: 30 TABLET, FILM COATED, EXTENDED RELEASE ORAL at 17:17

## 2021-04-03 RX ADMIN — ESCITALOPRAM OXALATE 10 MG: 10 TABLET ORAL at 08:43

## 2021-04-03 RX ADMIN — LISINOPRIL 20 MG: 20 TABLET ORAL at 20:27

## 2021-04-03 NOTE — PROGRESS NOTES
[facility]  Progress Note Nina Crease 1985, 38 y o  male MRN: [id number]  Unit/Bed#: W -01 Encounter: [id number]  Primary Care Provider: Philipp Nobles DO   Date and time admitted to hospital: 3/31/2021  3:26 PM    End stage renal disease on dialysis due to type 1 diabetes mellitus [facility])  Assessment & Plan  Patient previously on Peritoneal dialysis but had recurrent peritonitis thus changed to HD in December  HD M/W/F  Will get HD today  Plan   · Continue HD, nephrology on board  · Follow BMP, monitor cr and lytes  (P) [phone] [phone]    Renovascular hypertension  Assessment & Plan  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  BP acceptable today  Plan:   · Monitor blood pressure  · Continue home meds including:  · Cardura 2 mg daily  · Clonidine patch 0 3 mg  · Hydralazine 50 mg t i d  · Labetalol 300 mg b i d  Zack Goody · Lisinopril 20 mg b i d  · Nifedipine 60 mg b i d  Zack Goody · Torsemide 100 mg b i d  Zack Goody · Metallic zone 10 mg daily  History of lacunar cerebrovascular accident (CVA)  Assessment & Plan  · Continue ASA, statin    Type 1 diabetes mellitus [facility])  Assessment & Plan  Lab Results   Component Value Date    HGBA1C 6 9 (H) 11/13/2020       Recent Labs     04/02/21  2117 04/03/21  0345 04/03/21  0717 04/03/21  1104   POCGLU 84 269* 255* 227*       Blood Sugar Average: Last 72 hrs:    · Continue home insulin regimen   · lantus 6 units HS   · humalog 4 units TID   · Continue SSI for coverage   · accuchecks   · hypoglycemia protocol   (P) 182 [id number]    * Elbow pain, right  Assessment & Plan  2/2 cellulitis vs less likely bursitis vs unlikely septic arthritis   POA: Worsening swelling and pain in right elbow x4d  +limited ROM     · Patient states that he does ambulate with a cane with his right arm has been doing so for approximately 3 years, and does state that he newly started hemodialysis a couple of months ago and he does rest his right olecranon on the hemodialysis chair  · Was started on ceftriaxone and vancomycin in the emergency department possible septic bursitis  · CRP mildly elevated   · s  Still warm  +limited ROM (extension of elbow)  Elbow still swollen    Plan:   · Ortho consulted - no effusion, unlikely septic arthritis, more likely cellulitis  Recommend Abx  · Continue IV keflex 500mg (3x weekly per pharmacy)  If requires them OP will touch base with nephro so can be given with HD at dialysis center  Patient placed on vancomycin with dialysis  I did order MRSA swab  MRSA coverage was recommended by yesterday I think the patient might be able to does get away with cephalosporin  Hopeful discharge tomorrow and if that is the case will touch base with Nephrology for antibiotics for 2 more doses during dialysis  · Monitor temps, wbc  · Ice and elevation    Potential DC tomorrow tentative on continued clinical improvement  VTE Pharmacologic Prophylaxis:   Pharmacologic: Heparin  Mechanical VTE Prophylaxis in Place: Yes    Patient Centered Rounds: I have performed bedside rounds with nursing staff today  Time Spent for Care: 20 minutes  More than 50% of total time spent on counseling and coordination of care as described above  Current Length of Stay: 2 day(s)    Current Patient Status: Inpatient   Certification Statement: The patient will continue to require additional inpatient hospital stay due to Needing IV antibiotics and monitoring    Discharge Plan:  Discharge in the next 20 hours    Code Status: Level 1 - Full Code      Subjective:   Patient's  Moving the arm a little bit better states pain    Better with Tylenol    Objective:     Vitals:   Temp (24hrs), Av 4 °F (36 9 °C), Min:97 7 °F (36 5 °C), Max:98 8 °F (37 1 °C)    Temp:  [97 7 °F (36 5 °C)-98 8 °F (37 1 °C)] 97 7 °F (36 5 °C)  HR:  [70-77] 76  Resp:  [16-20] 16  BP: (157-209)/() 179/91  SpO2:  [97 %-99 %] 99 %  Body mass index is 29 64 kg/m²  Input and Output Summary (last 24 hours): Intake/Output Summary (Last 24 hours) at 4/3/2021 1151  Last data filed at 4/3/2021 0600  Gross per 24 hour   Intake 955 83 ml   Output 270 ml   Net 685 83 ml       Physical Exam:     Physical Exam  (   General Appearance:    Alert, cooperative, no distress, appears stated age                               Lungs:     Clear to auscultation bilaterally, respirations unlabored       Heart:    Regular rate and rhythm, S1 and S2 normal, no murmur, rub    or gallop   Abdomen:     Soft, non-tender, bowel sounds active all four quadrants,     no masses, no organomegaly           Extremities:   Redness and erythema improved  Range of motion has improved     Additional Data:     Labs:    Results from last 7 days   Lab Units 04/03/21  0534 04/02/21  0808   WBC Thousand/uL 5 74 6 87   HEMOGLOBIN g/dL 13 7 12 6   HEMATOCRIT % 41 8 39 1   PLATELETS Thousands/uL 262 244   NEUTROS PCT %  --  62   LYMPHS PCT %  --  15   MONOS PCT %  --  10   EOS PCT %  --  11*     Results from last 7 days   Lab Units 04/03/21  0546  03/31/21  1651   SODIUM mmol/L 139   < > 140   POTASSIUM mmol/L 4 8   < > 5 3   CHLORIDE mmol/L 97*   < > 98*   CO2 mmol/L 26   < > 30   BUN mg/dL 41*   < > 22   CREATININE mg/dL 8 42*   < > 6 60*   ANION GAP mmol/L 16*   < > 12   CALCIUM mg/dL 8 9   < > 8 9   ALBUMIN g/dL  --   --  3 8   TOTAL BILIRUBIN mg/dL  --   --  0 59   ALK PHOS U/L  --   --  89   ALT U/L  --   --  14   AST U/L  --   --  15   GLUCOSE RANDOM mg/dL 324*   < > 243*    < > = values in this interval not displayed           Results from last 7 days   Lab Units 04/03/21  1104 04/03/21  0717 04/03/21  0345 04/02/21  2117 04/02/21  1614 04/02/21  1101 04/02/21  0725 04/01/21  2109 04/01/21  1557 04/01/21  1058 04/01/21  0644 03/31/21  2345   POC GLUCOSE mg/dl 227* 255* 269* 84 232* 181* 200* 196* 150* 102 155* 254*         Results from last 7 days   Lab Units 04/02/21  0624 04/01/21  9709 PROCALCITONIN ng/ml 1 86* 2 74*           * I Have Reviewed All Lab Data Listed Above  * Additional Pertinent Lab Tests Reviewed: Ellen 66 Admission Reviewed        Recent Cultures (last 7 days):     Results from last 7 days   Lab Units 03/31/21  1654 03/31/21  1651   BLOOD CULTURE  No Growth at 48 hrs  No Growth at 48 hrs         Last 24 Hours Medication List:   Current Facility-Administered Medications   Medication Dose Route Frequency Provider Last Rate    acetaminophen  650 mg Oral Q6H PRN Geovanny FuelSAV      aspirin  81 mg Oral Daily Sudarshan Womack PA-C      atorvastatin  40 mg Oral QPM Sudarshan Womack PA-C      cefazolin  500 mg Intravenous Once per day on Mon Wed Fri Myra Arias MD Stopped (04/02/21 1304)    cholecalciferol  1,000 Units Oral Daily Sudarshan Womack PA-C      cinacalcet  60 mg Oral Every Other Day Tim Dobbins MD      cloNIDine  0 3 mg Transdermal Weekly Trang Hecks, DEISI      doxazosin  2 mg Oral Daily Timmy Beard MD      doxazosin  8 mg Oral HS Sudarshan Womack PA-C      escitalopram  10 mg Oral Daily Sudarshan Womack PA-C      famotidine  40 mg Oral HS PRN Geovanny Henry PA-C      gabapentin  200 mg Oral HS Sudarshan Womack PA-C      hydrALAZINE  50 mg Oral TID Tim Dobbins MD      insulin glargine  6 Units Subcutaneous HS Sudarshan Womack PA-C      insulin lispro  1-5 Units Subcutaneous HS Sudarshan Womack PA-C      insulin lispro  1-6 Units Subcutaneous TID AC Sudarshan Womack PA-C      insulin lispro  4 Units Subcutaneous TID With Meals Sudarshan Womack PA-C      labetalol  300 mg Oral BID Timmy Leblanc MD      Labetalol HCl  10 mg Intravenous Q6H PRN Timmy Leblanc MD      lisinopril  20 mg Oral BID Sudarshan Womack PA-C      metolazone  10 mg Oral Daily Before Lunch Timmy Leblanc MD      minoxidil  1 25 mg Oral Once per day on Mon Wed Fri DEISI Tolbert      minoxidil  10 mg Oral Daily DEISI Tolbert      multivitamin stress formula  1 tablet Oral Daily Before Lunch Sudarshan Womack PA-C      NIFEdipine ER  60 mg Oral BID Sudarshan Womack PA-C      ondansetron  4 mg Oral Q6H PRN Timmy Leblanc MD      saccharomyces boulardii  250 mg Oral BID Sudarshna Womack PA-C      torsemide  100 mg Oral BID before lunch/dinner Timmy Blancas MD      vancomycin  10 mg/kg Intravenous After Dialysis Rishi Madison MD          Today, Patient Was Seen By: Rishi Madison MD    ** Please Note: Dictation voice to text software may have been used in the creation of this document   **

## 2021-04-03 NOTE — PROGRESS NOTES
Progress Note - Orthopedics   Primitivo Mckeon 38 y o  male MRN: [id number]  Unit/Bed#: W -01      Subjective:    35 y o male seen and evaluated  He reports improvement in the right elbow since yesterday  No fevers or chills, no paresthesias, pain is controlled       Labs:  0   Lab Value Date/Time    HCT 41 8 04/03/2021 0534    HCT 39 1 04/02/2021 0808    HCT 39 8 04/01/2021 0521    HCT 32 1 (L) 10/28/2015 1743    HCT 31 6 (L) 10/27/2015 1449    HGB 13 7 04/03/2021 0534    HGB 12 6 04/02/2021 0808    HGB 12 7 04/01/2021 0521    HGB 11 6 (L) 10/28/2015 1743    HGB 10 8 (L) 10/27/2015 1449    INR 1 01 11/13/2020 1949    WBC 5 74 04/03/2021 0534    WBC 6 87 04/02/2021 0808    WBC 7 87 04/01/2021 0521    WBC 8 54 10/28/2015 1743    WBC 7 78 10/27/2015 1449    ESR 10 03/31/2021 1651    CRP 25 0 (H) 03/31/2021 1651       Meds:    Current Facility-Administered Medications:     acetaminophen (TYLENOL) oral suspension 650 mg, 650 mg, Oral, Q6H PRN, Sudarshan Womack PA-C, 650 mg at 04/03/21 4551    aspirin chewable tablet 81 mg, 81 mg, Oral, Daily, Sudarshan Womack PA-C, 81 mg at 04/03/21 7134    atorvastatin (LIPITOR) tablet 40 mg, 40 mg, Oral, QPM, Sudarshan Womack PA-C, 40 mg at 04/02/21 1745    ceFAZolin (ANCEF) 500 mg in sodium chloride 0 9 % 50 mL IVPB, 500 mg, Intravenous, Once per day on Mon Wed Fri, Jaylene Nance MD, Stopped at 04/02/21 1304    cholecalciferol (VITAMIN D3) tablet 1,000 Units, 1,000 Units, Oral, Daily, Sudarshan Womack PA-C, 1,000 Units at 04/03/21 9551    cinacalcet (SENSIPAR) tablet 60 mg, 60 mg, Oral, Every Other Day, Timmy Leblanc MD, 60 mg at 04/02/21 1745    cloNIDine (CATAPRES-TTS-3) 0 3 mg/24 hr TD weekly patch, 0 3 mg, Transdermal, Weekly, DEISI Hsieh, Stopped at 04/01/21 1239    doxazosin (CARDURA) tablet 2 mg, 2 mg, Oral, Daily, Timmy Leblanc MD, 2 mg at 04/02/21 1629    doxazosin (CARDURA) tablet 8 mg, 8 mg, Oral, HS, Sudarshan Womack PA-C, 8 mg at 04/02/21 7121   escitalopram (LEXAPRO) tablet 10 mg, 10 mg, Oral, Daily, Sudarshan Womack PA-C, 10 mg at 04/03/21 0843    famotidine (PEPCID) tablet 40 mg, 40 mg, Oral, HS PRN, Alejandra Manzano PA-C    gabapentin (NEURONTIN) capsule 200 mg, 200 mg, Oral, HS, Sudarshan Womack PA-C, 200 mg at 04/02/21 2135    hydrALAZINE (APRESOLINE) tablet 50 mg, 50 mg, Oral, TID, Timmy Leblanc MD, 50 mg at 04/03/21 0842    insulin glargine (LANTUS) subcutaneous injection 6 Units 0 06 mL, 6 Units, Subcutaneous, HS, Sudarshan Womack PA-C, 6 Units at 04/02/21 2144    insulin lispro (HumaLOG) 100 units/mL subcutaneous injection 1-5 Units, 1-5 Units, Subcutaneous, HS, Sudarshan Womack PA-C, 1 Units at 04/01/21 2110    insulin lispro (HumaLOG) 100 units/mL subcutaneous injection 1-6 Units, 1-6 Units, Subcutaneous, TID AC, 3 Units at 04/03/21 0843 **AND** Fingerstick Glucose (POCT), , , TID AC, Sudarshan Womack PA-C    insulin lispro (HumaLOG) 100 units/mL subcutaneous injection 4 Units, 4 Units, Subcutaneous, TID With Meals, Alejandra Manzano PA-C, 4 Units at 04/03/21 0845    labetalol (NORMODYNE) tablet 300 mg, 300 mg, Oral, BID, Timmy Leblanc MD, 300 mg at 04/03/21 0842    Labetalol HCl (NORMODYNE) injection 10 mg, 10 mg, Intravenous, Q6H PRN, Timmy Leblanc MD    lisinopril (ZESTRIL) tablet 20 mg, 20 mg, Oral, BID, Sudarshan Womack PA-C, 20 mg at 04/03/21 2126    metolazone (ZAROXOLYN) tablet 10 mg, 10 mg, Oral, Daily Before Lunch, Timmy Leblanc MD, 10 mg at 04/02/21 1235    minoxidil (LONITEN) tablet 1 25 mg, 1 25 mg, Oral, Once per day on Mon Wed Fri, DEISI Castillo, 1 25 mg at 04/02/21 2138    minoxidil (LONITEN) tablet 10 mg, 10 mg, Oral, Daily, DEISI Castillo, 10 mg at 04/03/21 3630    multivitamin stress formula tablet 1 tablet, 1 tablet, Oral, Daily Before Lunch, Sudarshan Womack PA-C, 1 tablet at 04/02/21 1236    NIFEdipine (PROCARDIA XL) 24 hr tablet 60 mg, 60 mg, Oral, BID, Sudarshan Womack PA-C, 60 mg at 04/03/21 5512    ondansetron (ZOFRAN-ODT) dispersible tablet 4 mg, 4 mg, Oral, Q6H PRN, Timmy Leblanc MD, 4 mg at 04/03/21 1700    saccharomyces boulardii (FLORASTOR) capsule 250 mg, 250 mg, Oral, BID, Sudarshan Womack PA-C, 250 mg at 04/03/21 0843    torsemide (DEMADEX) tablet 100 mg, 100 mg, Oral, BID before lunch/dinner, Timmy Leblanc MD, 100 mg at 04/02/21 1629    vancomycin (VANCOCIN) IVPB (premix in dextrose) 1,000 mg 200 mL, 10 mg/kg, Intravenous, After Dialysis, Zari Serra MD    Blood Culture:   Lab Results   Component Value Date    BLOODCX No Growth at 48 hrs  03/31/2021       Wound Culture:   Lab Results   Component Value Date    WOUNDCULT No growth 06/25/2019       Ins and Outs:  I/O last 24 hours: In: 1275 8 [P O :580; I V :200; IV Piggyback:495 8]  Out: 270 [Urine:270]          Physical:  Vitals:    04/03/21 0815   BP: (!) 179/91   Pulse: 76   Resp: 16   Temp: 97 7 °F (36 5 °C)   SpO2: 99%     Musculoskeletal: right Upper Extremity  · Skin without notable edema or erythema  · No palpable collection at the bursa  · No significant tenderness  · SILT m/r/u  Motor intact ain/pin/m/r/u, 2+ radial pulse  ·  Motion near full, lacking about 5 degrees of full extension     Assessment:    35 y o male with improving right elbow cellulitis      Plan:  · WBAT RUE  · Clinically improved, complete course of abx as outpatient   Will defer to primary team for abx selection   · Medical management per primary team  · No surgical intervention necessary   · Dispo: [id number] Vianca Dennison for discharge from ortho perspective, no outpatient follow up needed at this time    Lakeshia Lundy PA-C

## 2021-04-03 NOTE — TREATMENT PLAN
Chart reviewed, labs stable  Potassium 4 8, next dialysis treatment on Monday  Will see patient on Monday    Please call nephrology if any issues over the weekend

## 2021-04-03 NOTE — ASSESSMENT & PLAN NOTE
Lab Results   Component Value Date    HGBA1C 6 9 (H) 11/13/2020       Recent Labs     04/02/21  2117 04/03/21  0345 04/03/21  0717 04/03/21  1104   POCGLU 84 269* 255* 227*       Blood Sugar Average: Last 72 hrs:    · Continue home insulin regimen   · lantus 6 units HS   · humalog 4 units TID   · Continue SSI for coverage   · accuchecks   · hypoglycemia protocol   (P) 998 [phone]

## 2021-04-03 NOTE — ASSESSMENT & PLAN NOTE
Patient previously on Peritoneal dialysis but had recurrent peritonitis thus changed to HD in December  HD M/W/F  Will get HD today  Plan   · Continue HD, nephrology on board  · Follow BMP, monitor cr and lytes     035 0437

## 2021-04-03 NOTE — ASSESSMENT & PLAN NOTE
2/2 cellulitis vs less likely bursitis vs unlikely septic arthritis   POA: Worsening swelling and pain in right elbow x4d  +limited ROM  · Patient states that he does ambulate with a cane with his right arm has been doing so for approximately 3 years, and does state that he newly started hemodialysis a couple of months ago and he does rest his right olecranon on the hemodialysis chair  · Was started on ceftriaxone and vancomycin in the emergency department possible septic bursitis  · CRP mildly elevated   · s  Still warm  +limited ROM (extension of elbow)  Elbow still swollen    Plan:   · Ortho consulted - no effusion, unlikely septic arthritis, more likely cellulitis  Recommend Abx  · Continue IV keflex 500mg (3x weekly per pharmacy)  If requires them OP will touch base with nephro so can be given with HD at dialysis center  Patient placed on vancomycin with dialysis  I did order MRSA swab  I think the patient might be able to does get away with cephalosporin  Hopeful discharge tomorrow and if that is the case will touch base with Nephrology for antibiotics for 2 more doses during dialysis  · Monitor temps, wbc  · Ice and elevation    Potential DC tomorrow tentative on continued clinical improvement

## 2021-04-03 NOTE — PROGRESS NOTES
Vancomycin IV Pharmacy-to-Dose Consultation    Ya Calderón is a 38 y o  male who is currently receiving Vancomycin IV with management by the Pharmacy Consult service  Assessment/Plan:  The patient was reviewed  On HD Monday,Wednesday and Friday  Will continue with current dose of 10mg/kg/dose after dialysis  Pre-HD random level ordered prior to 3rd dose on 04/07  We will continue to follow the patients culture results and clinical progress daily      Daria Whitehead, Pharmacist

## 2021-04-03 NOTE — PLAN OF CARE
Problem: Nutrition/Hydration-ADULT  Goal: Nutrient/Hydration intake appropriate for improving, restoring or maintaining nutritional needs  Description: Monitor and assess patient's nutrition/hydration status for malnutrition  Collaborate with interdisciplinary team and initiate plan and interventions as ordered  Monitor patient's weight and dietary intake as ordered or per policy  Utilize nutrition screening tool and intervene as necessary  Determine patient's food preferences and provide high-protein, high-caloric foods as appropriate       INTERVENTIONS:  - Monitor oral intake, urinary output, labs, and treatment plans  - Assess nutrition and hydration status and recommend course of action  - Evaluate amount of meals eaten  - Assist patient with eating if necessary   - Allow adequate time for meals  - Recommend/ encourage appropriate diets, oral nutritional supplements, and vitamin/mineral supplements  - Order, calculate, and assess calorie counts as needed  - Recommend, monitor, and adjust tube feedings and TPN/PPN based on assessed needs  - Assess need for intravenous fluids  - Provide specific nutrition/hydration education as appropriate  - Include patient/family/caregiver in decisions related to nutrition  Outcome: Progressing     Problem: PAIN - ADULT  Goal: Verbalizes/displays adequate comfort level or baseline comfort level  Description: Interventions:  - Encourage patient to monitor pain and request assistance  - Assess pain using appropriate pain scale  - Administer analgesics based on type and severity of pain and evaluate response  - Implement non-pharmacological measures as appropriate and evaluate response  - Consider cultural and social influences on pain and pain management  - Notify physician/advanced practitioner if interventions unsuccessful or patient reports new pain  Outcome: Progressing     Problem: INFECTION - ADULT  Goal: Absence or prevention of progression during hospitalization  Description: INTERVENTIONS:  - Assess and monitor for signs and symptoms of infection  - Monitor lab/diagnostic results  - Monitor all insertion sites, i e  indwelling lines, tubes, and drains  - Monitor endotracheal if appropriate and nasal secretions for changes in amount and color  - [place] appropriate cooling/warming therapies per order  - Administer medications as ordered  - Instruct and encourage patient and family to use good hand hygiene technique  - Identify and instruct in appropriate isolation precautions for identified infection/condition  Outcome: Progressing  Goal: Absence of fever/infection during neutropenic period  Description: INTERVENTIONS:  - Monitor WBC    Outcome: Progressing     Problem: SAFETY ADULT  Goal: Patient will remain free of falls  Description: INTERVENTIONS:  - Assess patient frequently for physical needs  -  Identify cognitive and physical deficits and behaviors that affect risk of falls    -  [place] fall precautions as indicated by assessment   - Educate patient/family on patient safety including physical limitations  - Instruct patient to call for assistance with activity based on assessment  - Modify environment to reduce risk of injury  - Consider OT/PT consult to assist with strengthening/mobility  Outcome: Progressing  Goal: Maintain or return to baseline ADL function  Description: INTERVENTIONS:  -  Assess patient's ability to carry out ADLs; assess patient's baseline for ADL function and identify physical deficits which impact ability to perform ADLs (bathing, care of mouth/teeth, toileting, grooming, dressing, etc )  - Assess/evaluate cause of self-care deficits   - Assess range of motion  - Assess patient's mobility; develop plan if impaired  - Assess patient's need for assistive devices and provide as appropriate  - Encourage maximum independence but intervene and supervise when necessary  - Involve family in performance of ADLs  - Assess for home care needs following discharge   - Consider OT consult to assist with ADL evaluation and planning for discharge  - Provide patient education as appropriate  Outcome: Progressing  Goal: Maintain or return mobility status to optimal level  Description: INTERVENTIONS:  - Assess patient's baseline mobility status (ambulation, transfers, stairs, etc )    - Identify cognitive and physical deficits and behaviors that affect mobility  - Identify mobility aids required to assist with transfers and/or ambulation (gait belt, sit-to-stand, lift, walker, cane, etc )  - [place] fall precautions as indicated by assessment  - Record patient progress and toleration of activity level on Mobility SBAR; progress patient to next Phase/Stage  - Instruct patient to call for assistance with activity based on assessment  - Consider rehabilitation consult to assist with strengthening/weightbearing, etc   Outcome: Progressing     Problem: DISCHARGE PLANNING  Goal: Discharge to home or other facility with appropriate resources  Description: INTERVENTIONS:  - Identify barriers to discharge w/patient and caregiver  - Arrange for needed discharge resources and transportation as appropriate  - Identify discharge learning needs (meds, wound care, etc )  - Arrange for interpretive services to assist at discharge as needed  - Refer to Case Management Department for coordinating discharge planning if the patient needs post-hospital services based on physician/advanced practitioner order or complex needs related to functional status, cognitive ability, or social support system  Outcome: Progressing     Problem: Knowledge Deficit  Goal: Patient/family/caregiver demonstrates understanding of disease process, treatment plan, medications, and discharge instructions  Description: Complete learning assessment and assess knowledge base    Interventions:  - Provide teaching at level of understanding  - Provide teaching via preferred learning methods  Outcome: Progressing     Problem: Potential for Falls  Goal: Patient will remain free of falls  Description: INTERVENTIONS:  - Assess patient frequently for physical needs  -  Identify cognitive and physical deficits and behaviors that affect risk of falls    -  [place] fall precautions as indicated by assessment   - Educate patient/family on patient safety including physical limitations  - Instruct patient to call for assistance with activity based on assessment  - Modify environment to reduce risk of injury  - Consider OT/PT consult to assist with strengthening/mobility  Outcome: Progressing

## 2021-04-03 NOTE — ASSESSMENT & PLAN NOTE
[address]   [address]                     EMS Patient Care Report       
_______________________________________________________________________________
 
Name:       PAZ MONTGOMERY              Room #:                     PRE JEIMY BROCK#:      [id number]                       Account #:      [id number]  
Admission:              Attend Phys:                          
Discharge:              Date of Birth:  [birth year]  
                                                          Report #: [id number]
                                                                    [id number]
_______________________________________________________________________________
THIS REPORT FOR:   //name//                          
 
Report Transmitted: 2020 10:24
EMS Care Summary
[facility]
Incident [id number] @ 2020 10:17
 
Incident Location
[address] [place] [address]
 
Patient
PAZ MONTGOMERY
Female, 86 Years
 1934
 
Patient Address
[address] [place] [address]
 
Patient History
Diabetes,Hypertension (HTN),Depression,Back Pain (Chronic),
 
Patient Allergies
Demerol,
 
Patient Medications
Simvastatin, Amitriptyline, Citalopram, Metformin,
 
Chief Complaint
L FACIAL DROOP, SLURRED SPEECH
 
Disposition
Transported No Lights/[place]
 
Dispatch Reason
Stroke/CVA
 
Transported To
[facility]
 
Narrative
 IS DISPATCHED AND RESPONDS AS NOTED.
 
 
 
 
[address]   [address]                     EMS Patient Care Report       
_______________________________________________________________________________
 
Name:       PAZ MONTGOMERY              Room #:                     PRE ER  
DELMY#:      [id number]                       Account #:      [id number]  
Admission:              Attend Phys:                          
Discharge:              Date of Birth:  [birth year]  
                                                          Report #: [id number]
                                                                    [id number]
_______________________________________________________________________________
UPON ARRIVAL AT SCENE  IS DIRECTED TO PT ROOM.  STAFF STATES THAT PT HAS 
BEEN SEEN 2 PREVIOUS TIMES IN 4 DAYS FOR TIA'S.  THIS MORNING WHILE PROVIDING 
SERVICES TO PT, STAFF STATES PT HAD SUDDEN ONSET OF L FACIAL DROOP AND SLURRED 
SPEECH. 
 
UPON PT CONTACT, PT IS FOUND SEATED, ALERT, TRACKING, SHOWING NO SIGNS OF 
DISTRESS OR OBVIOUS TRAUMA.  PT HAS OBVIOUS L FACIAL DROOP NOTED.  PT CONFIRMS 
HER SPEECH IS ABNORMAL, HAS NO OTHER COMPLAINTS.  PT DENIES PAIN, SOA, NVD OR 
OTHER UNLISTED COMPLAINTS. 
 
STROKE ALERT CALLED.  PT MOVED TO COT VIA [place] [facility] WITHOUT INCIDENT. PT 
SECURED WITH ALL STRAPS, MOVED TO AMBULANCE WITHOUT INCIDENT.  PT AND FAMILY 
AGREE TO TRANSPORT TO NEAREST STROKE CENTER, [facility]. 
 
PT MONITORED ENROUTE.  RADIO REPORT AND STROKE ACTIVATION CALLED ENROUTE, NO 
QUESTIONS OR ORDERS ASKED OR RECEIVED. 
 
UPON ARRIVAL AT [facility] ER PT MOVED TO CT, PLACED ON SCANNER AND LEFT UNDER 
SUPERVISION OF ER RN AND CT TECH.  PT CARE TRANSFERRED TO ER SIDRA CALLAHAN WITH 
VERBAL REPORT. 
 
Initial Vitals
@10:31P: 79,R: 18,BP: 131/60,Pain: 0/10,GCS: 15,Glucose: 197,Revised Trauma: 12,
@10:24P: 82,R: 18,BP: 116/65,Pain: 0/10,GCS: 15,Revised Trauma: 12,
@10:36P: 74,R: 18,BP: 134/66,Pain: 0/10,GCS: 15,Revised Trauma: 12,
 
Assessments
@10:24MENTAL:Place Oriented,Time Oriented,Person Oriented,Event 
Oriented,SKIN:HEENT:Head/Face: Facial Droop,Eyes: Left Pupil: 4-mm,Eyes: Right 
Pupil: 4-mm,LUNG SOUNDS:Right Upper: Other,ABDOMEN:Right Upper: 
Other,PELVIS//GI:EXTREMITIES:Capillary Refill: Right Upper: < 2 Sec,Capillary 
Refill: Left Upper: < 2 Sec,PULSE:Radial: 2+ Normal,NEURO:Facial Droop,Slurred 
Speech, 
 
Impression
Stroke
 
Procedures
@10:24ALS AssessmentResponse: UnchangedSucceeded@10:25Stroke AlertResponse: 
Unchanged@10:29Saline Lock 10cc (18 ga) Site: Antecubital-LeftResponse: 
UnchangedSucceeded 
 
Timeline
10:16,Call Received
10:16,Psap Call
10:17,Dispatched
 
 
 
[address]   [address]                     EMS Patient Care Report       
_______________________________________________________________________________
 
Name:       PAZ MONTGOMERY              Room #:                     PRE ER  
M.R.#:      [id number]                       Account #:      [id number]  
Admission:              Attend Phys:                          
Discharge:              Date of Birth:  [birth year]  
                                                          Report #: [id number]
                                                                    [id number]
_______________________________________________________________________________
10:19,En Route
10:21,On Scene
10:23,At Patient
10:24,ALS Assessment,Response: UnchangedSucceeded,
10:24,BP: 116/65 M,PULSE: 82,RR: 18 R,SPO2:  Ox,ETCO2:  ,BG: ,PAIN: 0,GCS: 15,
10:25,Stroke Alert,Response: Unchanged
10:29,Saline Lock 10cc 18 ga Site: Antecubital-Left,Response: 
UnchangedSucceeded, 
10:30,Depart Scene
10:31,BP: 131/60 M,PULSE: 79,RR: 18 R,SPO2:  Ox,ETCO2:  ,B,PAIN: 0,GCS: 
15, 
10:36,BP: 134/66 M,PULSE: 74,RR: 18 R,SPO2:  Ox,ETCO2:  ,BG: ,PAIN: 0,GCS: 15,
10:40,At Destination
10:43,Transfer Patient
11:00,Call Closed
 
Disclaimer
v1.1     Copyright 2020 [employer]
This EMS Care Summary contains data elements from the applicable legal record 
(which may be displayed differently). It is designed to provide pertinent 
information for the following purposes: continuity of care, clinical quality, 
and state data reporting. The complete legal record is available to ED staff 
and administrators of the receiving hospital in [employer]'s Patient Tracker. All data 
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

## 2021-04-03 NOTE — PROGRESS NOTES
Patients blood sugar 84  SLIM notified  6 units of lantus given per order  Patient provided with apple juice   Blood sugar to be rechecked at 2 am

## 2021-04-04 VITALS
DIASTOLIC BLOOD PRESSURE: 83 MMHG | OXYGEN SATURATION: 94 % | RESPIRATION RATE: 14 BRPM | WEIGHT: 212.52 LBS | BODY MASS INDEX: 29.75 KG/M2 | HEART RATE: 76 BPM | TEMPERATURE: 98.1 F | SYSTOLIC BLOOD PRESSURE: 157 MMHG | HEIGHT: 71 IN

## 2021-04-04 LAB
GLUCOSE SERPL-MCNC: 155 MG/DL (ref 65–140)
GLUCOSE SERPL-MCNC: 162 MG/DL (ref 65–140)

## 2021-04-04 PROCEDURE — 82948 REAGENT STRIP/BLOOD GLUCOSE: CPT

## 2021-04-04 PROCEDURE — 99232 SBSQ HOSP IP/OBS MODERATE 35: CPT | Performed by: FAMILY MEDICINE

## 2021-04-04 PROCEDURE — 99233 SBSQ HOSP IP/OBS HIGH 50: CPT | Performed by: INTERNAL MEDICINE

## 2021-04-04 RX ORDER — DOXYCYCLINE 100 MG/1
100 TABLET ORAL 2 TIMES DAILY
Qty: 10 TABLET | Refills: 0 | Status: SHIPPED | OUTPATIENT
Start: 2021-04-04 | End: 2021-04-09

## 2021-04-04 RX ORDER — CEPHALEXIN 500 MG/1
500 CAPSULE ORAL EVERY 24 HOURS
Qty: 3 CAPSULE | Refills: 0 | Status: SHIPPED | OUTPATIENT
Start: 2021-04-04 | End: 2021-04-07

## 2021-04-04 RX ORDER — LABETALOL 100 MG/1
400 TABLET, FILM COATED ORAL 2 TIMES DAILY
Status: DISCONTINUED | OUTPATIENT
Start: 2021-04-04 | End: 2021-04-04 | Stop reason: HOSPADM

## 2021-04-04 RX ORDER — LABETALOL 200 MG/1
400 TABLET, FILM COATED ORAL 2 TIMES DAILY
Qty: 30 TABLET | Refills: 0 | Status: SHIPPED | OUTPATIENT
Start: 2021-04-04 | End: 2022-02-21

## 2021-04-04 RX ADMIN — Medication 250 MG: at 08:59

## 2021-04-04 RX ADMIN — MINOXIDIL 10 MG: 2.5 TABLET ORAL at 08:59

## 2021-04-04 RX ADMIN — ASPIRIN 81 MG CHEWABLE TABLET 81 MG: 81 TABLET CHEWABLE at 08:59

## 2021-04-04 RX ADMIN — TORSEMIDE 100 MG: 100 TABLET ORAL at 11:19

## 2021-04-04 RX ADMIN — HYDRALAZINE HYDROCHLORIDE 50 MG: 25 TABLET, FILM COATED ORAL at 08:59

## 2021-04-04 RX ADMIN — ZINC 1 TABLET: TAB ORAL at 11:19

## 2021-04-04 RX ADMIN — NIFEDIPINE 60 MG: 30 TABLET, FILM COATED, EXTENDED RELEASE ORAL at 08:59

## 2021-04-04 RX ADMIN — LISINOPRIL 20 MG: 20 TABLET ORAL at 08:59

## 2021-04-04 RX ADMIN — Medication 1000 UNITS: at 08:59

## 2021-04-04 RX ADMIN — LABETALOL HYDROCHLORIDE 300 MG: 100 TABLET ORAL at 08:59

## 2021-04-04 RX ADMIN — LABETALOL 20 MG/4 ML (5 MG/ML) INTRAVENOUS SYRINGE 10 MG: at 07:54

## 2021-04-04 RX ADMIN — INSULIN LISPRO 4 UNITS: 100 INJECTION, SOLUTION INTRAVENOUS; SUBCUTANEOUS at 09:02

## 2021-04-04 RX ADMIN — INSULIN LISPRO 4 UNITS: 100 INJECTION, SOLUTION INTRAVENOUS; SUBCUTANEOUS at 11:19

## 2021-04-04 RX ADMIN — ESCITALOPRAM OXALATE 10 MG: 10 TABLET ORAL at 08:59

## 2021-04-04 RX ADMIN — METOLAZONE 10 MG: 5 TABLET ORAL at 11:19

## 2021-04-04 RX ADMIN — INSULIN LISPRO 1 UNITS: 100 INJECTION, SOLUTION INTRAVENOUS; SUBCUTANEOUS at 11:19

## 2021-04-04 RX ADMIN — INSULIN LISPRO 1 UNITS: 100 INJECTION, SOLUTION INTRAVENOUS; SUBCUTANEOUS at 08:59

## 2021-04-04 RX ADMIN — ONDANSETRON 4 MG: 4 TABLET, ORALLY DISINTEGRATING ORAL at 03:57

## 2021-04-04 NOTE — ASSESSMENT & PLAN NOTE
Lab Results   Component Value Date    HGBA1C 6 9 (H) 11/13/2020       Recent Labs     04/03/21  1606 04/03/21  2100 04/04/21  0738 04/04/21  1109   POCGLU 197* 231* 162* 155*       Blood Sugar Average: Last 72 hrs:  BG at target (140-180)  · Continue home insulin regimen   · F/u with pcp  (P) 186 4

## 2021-04-04 NOTE — ASSESSMENT & PLAN NOTE
2/2 cellulitis vs less likely bursitis vs unlikely septic arthritis   POA: Worsening swelling and pain in right elbow x4d  +limited ROM  · Patient states that he does ambulate with a cane with his right arm has been doing so for approximately 3 years, and does state that he newly started hemodialysis a couple of months ago and he does rest his right olecranon on the hemodialysis chair  · Was started on ceftriaxone and vancomycin in the emergency department possible septic bursitis  · CRP mildly elevated   · This am: warm though improved  +limited ROM (extension of elbow), this has improved as well  Swelling decreased  · Cleared from nephrology and ortho standpoint for dc  OP f/u with nephrology  Plan: Medically stable for dc  Advised pt to return to ED if sxs return/worsen  · Ortho consulted - no effusion, unlikely septic arthritis, more likely cellulitis     · Continue PO keflex 500mg on dialysis days x 3 days + doxycycline 100 bid x5d (for MRSA coverage, MRSA swab pending)  · F/u with pcp within 1 week of dc

## 2021-04-04 NOTE — ASSESSMENT & PLAN NOTE
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  · BP significantly elevated this am (SBP >200) but had not yet received am meds  Was given prn labetalol with improvement to SBP 170s  Spoke with nurse to update her and to give pt am meds which she was in the process of doing  · No cp, sob, palpitations, diaphoresis this am      Plan:   · Monitor blood pressure  · Continue home meds including:  · Cardura 2 mg daily  · Clonidine patch 0 3 mg  · Hydralazine 50 mg t i d  · Labetalol 300 mg b i d  Eusebia  · Lisinopril 20 mg b i d  · Nifedipine 60 mg b i d  Eusebia Nanny · Torsemide 100 mg b i d  Eusebia Nanny · Metallic zone 10 mg daily    · Continue prn labetalol for SBP >180 or DBP > 110

## 2021-04-04 NOTE — PROGRESS NOTES
[facility]  Progress Note Juanis Davis 1985, 38 y o  male MRN: [id number]  Unit/Bed#: W -01 Encounter: [id number]  Primary Care Provider: Kay Javier DO   Date and time admitted to hospital: 3/31/2021  3:26 PM    * Elbow pain, right  Assessment & Plan  2/2 cellulitis vs less likely bursitis vs unlikely septic arthritis   POA: Worsening swelling and pain in right elbow x4d  +limited ROM  · Patient states that he does ambulate with a cane with his right arm has been doing so for approximately 3 years, and does state that he newly started hemodialysis a couple of months ago and he does rest his right olecranon on the hemodialysis chair  · Was started on ceftriaxone and vancomycin in the emergency department possible septic bursitis  · CRP mildly elevated   · This am: warm though improved  +limited ROM (extension of elbow), this has improved as well  Swelling decreased  Plan:   · Ortho consulted - no effusion, unlikely septic arthritis, more likely cellulitis  Recommend Abx  · Continue IV keflex 500mg (3x weekly per pharmacy)  · If requires abx OP will touch base with nephro so can be given with HD at dialysis center  · Patient placed on vancomycin with HD  MRSA swab ordered, pending  I think the patient might be able to get away with cephalosporin  · Nephrology would like to monitor for one more day given SBP >200 this am, now improved to SBP 170s  · Monitor temps, wbc  · Ice and elevation    Potential DC tomorrow tentative on continued clinical improvement  End stage renal disease on dialysis due to type 1 diabetes mellitus [facility])  Assessment & Plan  Patient previously on Peritoneal dialysis but had recurrent peritonitis thus changed to HD in December  HD M/W/F  Will get HD tomorrow  Nephrology following, appreciate input  Plan   · Continue HD, nephrology on board     · Nephrology would like to monitor for one more day as SBP > 200 this am    · Follow BMP, monitor cr and lytes  (P) 186  4    Renovascular hypertension  Assessment & Plan  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  · BP significantly elevated this am (SBP >200) but had not yet received am meds  Was given prn labetalol with improvement to SBP 170s  Spoke with nurse to update her and to give pt am meds which she was in the process of doing  · No cp, sob, palpitations, diaphoresis this am      Plan:   · Monitor blood pressure  · Continue home meds including:  · Cardura 2 mg daily  · Clonidine patch 0 3 mg  · Hydralazine 50 mg t i d  · Labetalol 300 mg b i d  Casey Rebolledo · Lisinopril 20 mg b i d  · Nifedipine 60 mg b i d  Casey Rebolledo · Torsemide 100 mg b i d  Casey Rebolledo · Metallic zone 10 mg daily  · Continue prn labetalol for SBP >180 or DBP > 110    History of lacunar cerebrovascular accident (CVA)  Assessment & Plan  · Continue ASA, statin    Type 1 diabetes mellitus [facility])  Assessment & Plan  Lab Results   Component Value Date    HGBA1C 6 9 (H) 11/13/2020       Recent Labs     04/03/21  1606 04/03/21  2100 04/04/21  0738 04/04/21  1109   POCGLU 197* 231* 162* 155*       Blood Sugar Average: Last 72 hrs:  BG at target (140-180)  · Continue home insulin regimen   · lantus 6 units HS   · humalog 4 units TID   · Continue SSI for coverage   · accuchecks   · hypoglycemia protocol   (P) 186 4          VTE Pharmacologic Prophylaxis:   Pharmacologic: age < 36, VTE risk score 2  Mechanical VTE Prophylaxis in Place: Yes    Discussions with Specialists or Other Care Team Provider: attending physician    Education and Discussions with Family / Patient: discussed plan with pt and he is agreeable  Current Length of Stay: 3 day(s)    Current Patient Status: Inpatient     Discharge Plan / Estimated Discharge Date: 24 hrs  Will DC home, tentative on improvement in BP and clearance from Nephro and Ortho  Code Status: Level 1 - Full Code      Subjective:   Right elbow pain and ROM improved   No fevers, night sweats, cp, sob, sweating  +Nausea/vomiting which he attributes to diabetic gastroparesis  ROS is otherwise negative  Objective:     Vitals:   Temp (24hrs), Av 2 °F (36 8 °C), Min:98 °F (36 7 °C), Max:98 6 °F (37 °C)    Temp:  [98 °F (36 7 °C)-98 6 °F (37 °C)] 98 1 °F (36 7 °C)  HR:  [76-86] 76  Resp:  [14-18] 14  BP: (149-221)/(82-96) 157/83  SpO2:  [94 %-98 %] 94 %  Body mass index is 29 64 kg/m²  Input and Output Summary (last 24 hours): Intake/Output Summary (Last 24 hours) at 2021 1131  Last data filed at 2021 0620  Gross per 24 hour   Intake 320 ml   Output 575 ml   Net -255 ml         Physical Exam  Vitals signs and nursing note reviewed  Constitutional:       General: He is not in acute distress  Appearance: He is ill-appearing  He is not toxic-appearing or diaphoretic (chronically)  HENT:      Mouth/Throat:      Mouth: Mucous membranes are moist    Eyes:      Extraocular Movements: Extraocular movements intact  Pupils: Pupils are equal, round, and reactive to light  Neck:      Musculoskeletal: Neck supple  Cardiovascular:      Rate and Rhythm: Normal rate and regular rhythm  Pulses: Normal pulses  Heart sounds: Normal heart sounds  Pulmonary:      Effort: Pulmonary effort is normal       Breath sounds: Normal breath sounds  Abdominal:      General: Bowel sounds are normal  There is no distension  Palpations: Abdomen is soft  Tenderness: There is no abdominal tenderness  There is no guarding  Musculoskeletal:         General: No tenderness (of BL calves)  Right elbow: He exhibits decreased range of motion (improved) and swelling (improved)  Tenderness: improving  Arms:       Right lower leg: No edema  Left lower leg: No edema  Skin:     Capillary Refill: Capillary refill takes less than 2 seconds  Findings: No erythema (of BL calves)            Neurological:      Mental Status: He is alert and oriented to person, place, and time    Psychiatric:         Mood and Affect: Mood normal       Comments: Pleasant affect          Additional Data:     Labs:    Results from last 7 days   Lab Units 04/03/21  0534 04/02/21  0808   WBC Thousand/uL 5 74 6 87   HEMOGLOBIN g/dL 13 7 12 6   HEMATOCRIT % 41 8 39 1   PLATELETS Thousands/uL 262 244   NEUTROS PCT %  --  62   LYMPHS PCT %  --  15   MONOS PCT %  --  10   EOS PCT %  --  11*     Results from last 7 days   Lab Units 04/03/21  0546  03/31/21  1651   POTASSIUM mmol/L 4 8   < > 5 3   CHLORIDE mmol/L 97*   < > 98*   CO2 mmol/L 26   < > 30   BUN mg/dL 41*   < > 22   CREATININE mg/dL 8 42*   < > 6 60*   CALCIUM mg/dL 8 9   < > 8 9   ALK PHOS U/L  --   --  89   ALT U/L  --   --  14   AST U/L  --   --  15    < > = values in this interval not displayed  * I Have Reviewed All Lab Data Listed Above  * Additional Pertinent Lab Tests Reviewed: All Labs Within Last 24 Hours Reviewed    Imaging:    Imaging Reports Reviewed Today Include: none  Imaging Personally Reviewed by Myself Includes: none    Recent Cultures (last 7 days):     Results from last 7 days   Lab Units 03/31/21  1654 03/31/21  1651   BLOOD CULTURE  No Growth at 72 hrs  No Growth at 72 hrs         Last 24 Hours Medication List:   Current Facility-Administered Medications   Medication Dose Route Frequency Provider Last Rate    acetaminophen  650 mg Oral Q6H PRN Brigid Lewis PA-C      aspirin  81 mg Oral Daily Sudarshan Womack PA-C      atorvastatin  40 mg Oral QPM Sudarshan Womack PA-C      cefazolin  500 mg Intravenous Once per day on Mon Wed Fri Merry Gonzalez MD Stopped (04/02/21 1304)    cholecalciferol  1,000 Units Oral Daily Sudarshan Womack PA-C      cinacalcet  60 mg Oral Every Other Day Kian De La Rosa MD      cloNIDine  0 3 mg Transdermal Weekly DEISI Easley      doxazosin  2 mg Oral Daily Timmy Leblanc MD      doxazosin  8 mg Oral HS Sudarshan Womack PA-C      escitalopram  10 mg Oral Daily Sudarshan Womack SAV      famotidine  40 mg Oral HS PRN Nenita Pierre PA-C      gabapentin  200 mg Oral HS Sudarshan Womack PA-C      hydrALAZINE  50 mg Oral TID Selin Whiting MD      insulin glargine  6 Units Subcutaneous HS Sudarshan Womack PA-C      insulin lispro  1-5 Units Subcutaneous HS Sudarshan Womack PA-C      insulin lispro  1-6 Units Subcutaneous TID AC Sudarshan Womack PA-C      insulin lispro  4 Units Subcutaneous TID With Meals Sudarshan Womack PA-C      labetalol  300 mg Oral BID Timmy Leblanc MD      Labetalol HCl  10 mg Intravenous Q6H PRN Timmy Leblanc MD      lisinopril  20 mg Oral BID Sudarshan Womack PA-C      metolazone  10 mg Oral Daily Before Lunch Timmy Leblanc MD      minoxidil  1 25 mg Oral Once per day on Mon Wed Fri DEISI Forrest      minoxidil  10 mg Oral Daily DEISI Forrest      multivitamin stress formula  1 tablet Oral Daily Before Lunch Sudarshan Womack PA-C      NIFEdipine ER  60 mg Oral BID Sudarshan Womack PA-C      ondansetron  4 mg Oral Q6H PRN Timmy Leblanc MD      saccharomyces boulardii  250 mg Oral BID Sudarshan Womack PA-C      torsemide  100 mg Oral BID before lunch/dinner Timmy Lozano MD      vancomycin  10 mg/kg Intravenous After Dialysis Macario Issa MD          Today, Patient Was Seen By: Selin Whiting MD    ** Please Note: This note has been constructed using a voice recognition system   **

## 2021-04-04 NOTE — ASSESSMENT & PLAN NOTE
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

## 2021-04-04 NOTE — ASSESSMENT & PLAN NOTE
Patient previously on Peritoneal dialysis but had recurrent peritonitis thus changed to HD in December  HD M/W/F  Will get HD tomorrow  Nephrology following, appreciate input  Plan   · Continue HD, nephrology on board  · Nephrology would like to monitor for one more day as SBP > 200 this am    · Follow BMP, monitor cr and lytes     (P) 691 4 Subjective:       Patient ID: Emerson Spring is a 27 y.o. male.    Chief Complaint: Follow-up ( 3month )    Pt is a 27 y.o. male who presents for evaluation and management of   Encounter Diagnoses   Name Primary?    Attention deficit disorder, unspecified hyperactivity presence     Circadian rhythm disorder    .ED with adderal    Review of Systems   Respiratory: Negative for chest tightness and shortness of breath.    Cardiovascular: Negative for chest pain and palpitations.   Genitourinary:        ED    Neurological: Negative for headaches.   Psychiatric/Behavioral: Negative for agitation, behavioral problems and decreased concentration.       Objective:      Physical Exam   Constitutional: He is oriented to person, place, and time. He appears well-developed and well-nourished.   HENT:   Head: Normocephalic and atraumatic.   Right Ear: External ear normal.   Left Ear: External ear normal.   Nose: Nose normal.   Mouth/Throat: Oropharynx is clear and moist.   Eyes: Pupils are equal, round, and reactive to light. Conjunctivae and EOM are normal. Right eye exhibits no discharge. Left eye exhibits no discharge. No scleral icterus.   Neck: Normal range of motion. Neck supple. No JVD present. No tracheal deviation present. No thyromegaly present.   Cardiovascular: Normal rate, regular rhythm, normal heart sounds and intact distal pulses.   No murmur heard.  Pulmonary/Chest: Effort normal and breath sounds normal. No respiratory distress. He has no wheezes. He has no rales. He exhibits no tenderness.   Abdominal: Soft. Bowel sounds are normal. He exhibits no distension and no mass. There is no tenderness. There is no rebound and no guarding.   Musculoskeletal: Normal range of motion.   Lymphadenopathy:     He has no cervical adenopathy.   Neurological: He is alert and oriented to person, place, and time. He has normal reflexes. He displays normal reflexes. No cranial nerve deficit. He exhibits normal muscle tone.  Coordination normal.   Skin: Skin is warm and dry.   Psychiatric: He has a normal mood and affect. His behavior is normal. Judgment and thought content normal.       Assessment:       1. Attention deficit disorder, unspecified hyperactivity presence    2. Circadian rhythm disorder        Plan:   Emerson was seen today for follow-up.    Diagnoses and all orders for this visit:    Erectile dysfunction, unspecified erectile dysfunction type  -     Discontinue: tadalafil (CIALIS) 20 MG Tab; Take 1 tablet (20 mg total) by mouth once daily.  -     tadalafil (CIALIS) 20 MG Tab; Take 1 tablet (20 mg total) by mouth once daily.    Attention deficit disorder, unspecified hyperactivity presence  -     Discontinue: lisdexamfetamine (VYVANSE) 40 MG Cap; Take 1 capsule (40 mg total) by mouth once daily.  -     lisdexamfetamine (VYVANSE) 40 MG Cap; Take 1 capsule (40 mg total) by mouth once daily.    Circadian rhythm disorder  -     Discontinue: lisdexamfetamine (VYVANSE) 40 MG Cap; Take 1 capsule (40 mg total) by mouth once daily.  -     lisdexamfetamine (VYVANSE) 40 MG Cap; Take 1 capsule (40 mg total) by mouth once daily.    Other orders  -     Cancel: dextroamphetamine-amphetamine (ADDERALL XR) 20 MG 24 hr capsule; Take 2 capsules (40 mg total) by mouth every morning.      Problem List Items Addressed This Visit     None      Visit Diagnoses     Erectile dysfunction, unspecified erectile dysfunction type    -  Primary    Relevant Medications    tadalafil (CIALIS) 20 MG Tab    Attention deficit disorder, unspecified hyperactivity presence        Relevant Medications    lisdexamfetamine (VYVANSE) 40 MG Cap    Circadian rhythm disorder        Relevant Medications    lisdexamfetamine (VYVANSE) 40 MG Cap        Will d.c adderall as he is having side effects----erectile dysfunction.   Trying vyvanse which he has used in the past with success   cialis in case he needs it     RTC 1 month       No follow-ups on file.

## 2021-04-04 NOTE — PROGRESS NOTES
NEPHROLOGY PROGRESS NOTE   Trenton Mckeon 38 y o  male MRN: [id number]  Unit/Bed#: W -01 Encounter: [id number]    ASSESSMENT & PLAN:  End-stage renal disease on dialysis  -Outpatient unit:  [facility] Monday Wednesday Friday  -Schedule:  Monday Wednesday Friday  -Access:  Left upper extremity AV fistula  -Plan:  Status post hemodialysis treatment on Friday  Next dialysis treatment tomorrow  Clinically appears euvolemic  Volume status  -appears euvolemic    CKD/MBD  -continue outpatient medications:  PhosLo, Sensipar, vitamin-D   -monitor PTH and phosphorus level as outpatient    Blood pressure/hypertension  -blood pressure was elevated earlier today but then trended down  Has been chronically elevated  -continue current medications:  Cardura, clonidine patch, hydralazine, labetalol, lisinopril, nifedipine, torsemide, minoxidil  Also on torsemide and metolazone  -continue ultrafiltration on hemodialysis treatment  -his blood pressure has been difficult to control and has been labile  -increase the dose of labetalol to 400 mg twice daily from previous dose of 300 mg twice daily and continue 4 mg twice daily dose at the time of discharge  Continue to monitor heart rate, currently around 80s    Anemia due to ESRD  -Goal hemoglobin:  10-11 grams/deciliter  -Current hemoglobin:  Stable and is at goal  -Plan:  Not on HETAL    Chronic hyperkalemia:  On Lokelma 5 mg on non dialysis days as outpatient  Not available as inpatient  Olecranon bursitis:  Continue antibiotic per primary team   Currently on Ancef    Discussed with Dr Manuel Swain for outpatient care from Nephrology side      SUBJECTIVE:  Has some pain over right elbow    Had some nausea vomiting earlier today but no shortness of Breath    OBJECTIVE:  Current Weight: Weight - Scale: 96 4 kg (212 lb 8 4 oz)  Vitals:    04/04/21 1117   BP: 157/83   Pulse: 76   Resp:    Temp:    SpO2:        Intake/Output Summary (Last 24 hours) at 4/4/2021 [address] [place] [address] filed at 4/4/2021 0620  Gross per 24 hour   Intake 320 ml   Output 575 ml   Net -255 ml       Physical Exam  General:  Ill looking, awake  Eyes: Conjunctivae pink,  Sclera anicteric  ENT: lips and mucous membranes moist  Neck: supple   Chest: Clear to Auscultation both lungs,  no crackles, ronchus or wheezing  CVS: S1 & S2 present, normal rate, regular rhythm, no murmur  Abdomen: soft, non-tender, non-distended, Bowel sounds normoactive  Extremities: no edema of  Legs   Mild swelling over right elbow, left forearm avf  Skin: no rash  Neuro: awake, alert, oriented  Psych: Mood and affect appropriate       Medications:    Current Facility-Administered Medications:     acetaminophen (TYLENOL) oral suspension 650 mg, 650 mg, Oral, Q6H PRN, Sudarshan Womack PA-C, 650 mg at 04/03/21 0484    aspirin chewable tablet 81 mg, 81 mg, Oral, Daily, Sudarshan Womack PA-C, 81 mg at 04/04/21 0859    atorvastatin (LIPITOR) tablet 40 mg, 40 mg, Oral, QPM, Sudarshan Womack PA-C, 40 mg at 04/03/21 1718    ceFAZolin (ANCEF) 500 mg in sodium chloride 0 9 % 50 mL IVPB, 500 mg, Intravenous, Once per day on Mon Wed Fri, Renay Esquivel MD, Stopped at 04/02/21 1304    cholecalciferol (VITAMIN D3) tablet 1,000 Units, 1,000 Units, Oral, Daily, Sudarshan Womack PA-C, 1,000 Units at 04/04/21 0859    cinacalcet (SENSIPAR) tablet 60 mg, 60 mg, Oral, Every Other Day, Timmy Leblanc MD, 60 mg at 04/02/21 1745    cloNIDine (CATAPRES-TTS-3) 0 3 mg/24 hr TD weekly patch, 0 3 mg, Transdermal, Weekly, DEISI Cope, Stopped at 04/01/21 1239    doxazosin (CARDURA) tablet 2 mg, 2 mg, Oral, Daily, Timmy Leblanc MD, 2 mg at 04/03/21 1717    doxazosin (CARDURA) tablet 8 mg, 8 mg, Oral, HS, Sudarshan Womack PA-C, 8 mg at 04/03/21 2142    escitalopram (LEXAPRO) tablet 10 mg, 10 mg, Oral, Daily, Sudarshan Womack PA-C, 10 mg at 04/04/21 0859    famotidine (PEPCID) tablet 40 mg, 40 mg, Oral, HS PRN, Timi Harris PA-C    gabapentin (NEURONTIN) capsule 200 mg, 200 mg, Oral, HS, Sudarshan Womack PA-C, 200 mg at 04/03/21 2141    hydrALAZINE (APRESOLINE) tablet 50 mg, 50 mg, Oral, TID, Timmy Leblanc MD, 50 mg at 04/04/21 0859    insulin glargine (LANTUS) subcutaneous injection 6 Units 0 06 mL, 6 Units, Subcutaneous, HS, Sudarshan Womack PA-C, 6 Units at 04/03/21 2140    insulin lispro (HumaLOG) 100 units/mL subcutaneous injection 1-5 Units, 1-5 Units, Subcutaneous, HS, Sudarshan Womack PA-C, 2 Units at 04/03/21 2141    insulin lispro (HumaLOG) 100 units/mL subcutaneous injection 1-6 Units, 1-6 Units, Subcutaneous, TID AC, 1 Units at 04/04/21 1119 **AND** Fingerstick Glucose (POCT), , , TID AC, Sudarshan Womack PA-C    insulin lispro (HumaLOG) 100 units/mL subcutaneous injection 4 Units, 4 Units, Subcutaneous, TID With Meals, Sudarshan Womack PA-C, 4 Units at 04/04/21 1119    labetalol (NORMODYNE) tablet 300 mg, 300 mg, Oral, BID, Timmy Leblanc MD, 300 mg at 04/04/21 0859    Labetalol HCl (NORMODYNE) injection 10 mg, 10 mg, Intravenous, Q6H PRN, Timmy Leblanc MD, 10 mg at 04/04/21 0754    lisinopril (ZESTRIL) tablet 20 mg, 20 mg, Oral, BID, Sudarshan Womack PA-C, 20 mg at 04/04/21 0312    metolazone (ZAROXOLYN) tablet 10 mg, 10 mg, Oral, Daily Before Lunch, Timmy Leblanc MD, 10 mg at 04/04/21 1119    minoxidil (LONITEN) tablet 1 25 mg, 1 25 mg, Oral, Once per day on Mon Wed Fri, DEISI Tolbert, 1 25 mg at 04/02/21 2138    minoxidil (LONITEN) tablet 10 mg, 10 mg, Oral, Daily, DEISI Tolbert, 10 mg at 04/04/21 4455    multivitamin stress formula tablet 1 tablet, 1 tablet, Oral, Daily Before Lunch, Sudarshan Womack PA-C, 1 tablet at 04/04/21 1119    NIFEdipine (PROCARDIA XL) 24 hr tablet 60 mg, 60 mg, Oral, BID, Sudarshan Womack PA-C, 60 mg at 04/04/21 0859    ondansetron (ZOFRAN-ODT) dispersible tablet 4 mg, 4 mg, Oral, Q6H PRN, Timmy Leblanc MD, 4 mg at 04/04/21 0357    saccharomyces boulardii (FLORASTOR) capsule 250 mg, 250 mg, Oral, BID, Sudarshan Womack PA-C, 250 mg at 04/04/21 0859    torsemide (DEMADEX) tablet 100 mg, 100 mg, Oral, BID before lunch/dinner, Timmy Leblanc MD, 100 mg at 04/04/21 1119    vancomycin (VANCOCIN) IVPB (premix in dextrose) 1,000 mg 200 mL, 10 mg/kg, Intravenous, After Dialysis, Renay Esquivel MD    Invasive Devices:        Lab Results:   Results from last 7 days   Lab Units 04/03/21  0546 04/03/21  0534 04/02/21  0808 04/01/21  0521 03/31/21  1651   WBC Thousand/uL  --  5 74 6 87 7 87 6 36   HEMOGLOBIN g/dL  --  13 7 12 6 12 7 12 7   HEMATOCRIT %  --  41 8 39 1 39 8 40 8   PLATELETS Thousands/uL  --  262 244 245 265   POTASSIUM mmol/L 4 8  --  6 0* 4 4 5 3   CHLORIDE mmol/L 97*  --  101 103 98*   CO2 mmol/L 26  --  21 19* 30   BUN mg/dL 41*  --  50* 32* 22   CREATININE mg/dL 8 42*  --  11 29* 8 32* 6 60*   CALCIUM mg/dL 8 9  --  8 9 9 0 8 9   ALK PHOS U/L  --   --   --   --  89   ALT U/L  --   --   --   --  14   AST U/L  --   --   --   --  15       Previous work up:         Portions of the record may have been created with voice recognition software  Occasional wrong word or "sound a like" substitutions may have occurred due to the inherent limitations of voice recognition software  Read the chart carefully and recognize, using context, where substitutions have occurred  If you have any questions, please contact the dictating provider

## 2021-04-04 NOTE — PLAN OF CARE
Problem: Nutrition/Hydration-ADULT  Goal: Nutrient/Hydration intake appropriate for improving, restoring or maintaining nutritional needs  Description: Monitor and assess patient's nutrition/hydration status for malnutrition  Collaborate with interdisciplinary team and initiate plan and interventions as ordered  Monitor patient's weight and dietary intake as ordered or per policy  Utilize nutrition screening tool and intervene as necessary  Determine patient's food preferences and provide high-protein, high-caloric foods as appropriate       INTERVENTIONS:  - Monitor oral intake, urinary output, labs, and treatment plans  - Assess nutrition and hydration status and recommend course of action  - Evaluate amount of meals eaten  - Assist patient with eating if necessary   - Allow adequate time for meals  - Recommend/ encourage appropriate diets, oral nutritional supplements, and vitamin/mineral supplements  - Order, calculate, and assess calorie counts as needed  - Recommend, monitor, and adjust tube feedings and TPN/PPN based on assessed needs  - Assess need for intravenous fluids  - Provide specific nutrition/hydration education as appropriate  - Include patient/family/caregiver in decisions related to nutrition  Outcome: Progressing     Problem: PAIN - ADULT  Goal: Verbalizes/displays adequate comfort level or baseline comfort level  Description: Interventions:  - Encourage patient to monitor pain and request assistance  - Assess pain using appropriate pain scale  - Administer analgesics based on type and severity of pain and evaluate response  - Implement non-pharmacological measures as appropriate and evaluate response  - Consider cultural and social influences on pain and pain management  - Notify physician/advanced practitioner if interventions unsuccessful or patient reports new pain  Outcome: Progressing     Problem: INFECTION - ADULT  Goal: Absence or prevention of progression during hospitalization  Description: INTERVENTIONS:  - Assess and monitor for signs and symptoms of infection  - Monitor lab/diagnostic results  - Monitor all insertion sites, i e  indwelling lines, tubes, and drains  - Monitor endotracheal if appropriate and nasal secretions for changes in amount and color  - [place] appropriate cooling/warming therapies per order  - Administer medications as ordered  - Instruct and encourage patient and family to use good hand hygiene technique  - Identify and instruct in appropriate isolation precautions for identified infection/condition  Outcome: Progressing  Goal: Absence of fever/infection during neutropenic period  Description: INTERVENTIONS:  - Monitor WBC    Outcome: Progressing     Problem: SAFETY ADULT  Goal: Patient will remain free of falls  Description: INTERVENTIONS:  - Assess patient frequently for physical needs  -  Identify cognitive and physical deficits and behaviors that affect risk of falls    -  [place] fall precautions as indicated by assessment   - Educate patient/family on patient safety including physical limitations  - Instruct patient to call for assistance with activity based on assessment  - Modify environment to reduce risk of injury  - Consider OT/PT consult to assist with strengthening/mobility  Outcome: Progressing  Goal: Maintain or return to baseline ADL function  Description: INTERVENTIONS:  -  Assess patient's ability to carry out ADLs; assess patient's baseline for ADL function and identify physical deficits which impact ability to perform ADLs (bathing, care of mouth/teeth, toileting, grooming, dressing, etc )  - Assess/evaluate cause of self-care deficits   - Assess range of motion  - Assess patient's mobility; develop plan if impaired  - Assess patient's need for assistive devices and provide as appropriate  - Encourage maximum independence but intervene and supervise when necessary  - Involve family in performance of ADLs  - Assess for home care needs following discharge   - Consider OT consult to assist with ADL evaluation and planning for discharge  - Provide patient education as appropriate  Outcome: Progressing  Goal: Maintain or return mobility status to optimal level  Description: INTERVENTIONS:  - Assess patient's baseline mobility status (ambulation, transfers, stairs, etc )    - Identify cognitive and physical deficits and behaviors that affect mobility  - Identify mobility aids required to assist with transfers and/or ambulation (gait belt, sit-to-stand, lift, walker, cane, etc )  - [place] fall precautions as indicated by assessment  - Record patient progress and toleration of activity level on Mobility SBAR; progress patient to next Phase/Stage  - Instruct patient to call for assistance with activity based on assessment  - Consider rehabilitation consult to assist with strengthening/weightbearing, etc   Outcome: Progressing     Problem: DISCHARGE PLANNING  Goal: Discharge to home or other facility with appropriate resources  Description: INTERVENTIONS:  - Identify barriers to discharge w/patient and caregiver  - Arrange for needed discharge resources and transportation as appropriate  - Identify discharge learning needs (meds, wound care, etc )  - Arrange for interpretive services to assist at discharge as needed  - Refer to Case Management Department for coordinating discharge planning if the patient needs post-hospital services based on physician/advanced practitioner order or complex needs related to functional status, cognitive ability, or social support system  Outcome: Progressing     Problem: Knowledge Deficit  Goal: Patient/family/caregiver demonstrates understanding of disease process, treatment plan, medications, and discharge instructions  Description: Complete learning assessment and assess knowledge base    Interventions:  - Provide teaching at level of understanding  - Provide teaching via preferred learning methods  Outcome: Progressing     Problem: Potential for Falls  Goal: Patient will remain free of falls  Description: INTERVENTIONS:  - Assess patient frequently for physical needs  -  Identify cognitive and physical deficits and behaviors that affect risk of falls    -  [place] fall precautions as indicated by assessment   - Educate patient/family on patient safety including physical limitations  - Instruct patient to call for assistance with activity based on assessment  - Modify environment to reduce risk of injury  - Consider OT/PT consult to assist with strengthening/mobility  Outcome: Progressing

## 2021-04-04 NOTE — ASSESSMENT & PLAN NOTE
2/2 cellulitis vs less likely bursitis vs unlikely septic arthritis   POA: Worsening swelling and pain in right elbow x4d  +limited ROM  · Patient states that he does ambulate with a cane with his right arm has been doing so for approximately 3 years, and does state that he newly started hemodialysis a couple of months ago and he does rest his right olecranon on the hemodialysis chair  · Was started on ceftriaxone and vancomycin in the emergency department possible septic bursitis  · CRP mildly elevated   · This am: warm though improved  +limited ROM (extension of elbow), this has improved as well  Swelling decreased  Plan:   · Ortho consulted - no effusion, unlikely septic arthritis, more likely cellulitis  Recommend Abx  · Continue IV keflex 500mg (3x weekly per pharmacy)  · If requires abx OP will touch base with nephro so can be given with HD at dialysis center  · Patient placed on vancomycin with HD  MRSA swab ordered, pending  I think the patient might be able to get away with cephalosporin  · Nephrology would like to monitor for one more day given SBP >200 this am, now improved to SBP 170s  · Monitor temps, wbc  · Ice and elevation    Potential DC tomorrow tentative on continued clinical improvement

## 2021-04-04 NOTE — ASSESSMENT & PLAN NOTE
Lab Results   Component Value Date    HGBA1C 6 9 (H) 11/13/2020       Recent Labs     04/03/21  1606 04/03/21  2100 04/04/21  0738 04/04/21  1109   POCGLU 197* 231* 162* 155*       Blood Sugar Average: Last 72 hrs:  BG at target (140-180)  · Continue home insulin regimen   · lantus 6 units HS   · humalog 4 units TID   · Continue SSI for coverage   · accuchecks   · hypoglycemia protocol   (P) [phone]

## 2021-04-04 NOTE — ASSESSMENT & PLAN NOTE
Patient previously on Peritoneal dialysis but had recurrent peritonitis thus changed to HD in December  HD M/W/F  Will get HD tomorrow  Nephrology following, appreciate input  Plan   · F/u with nephrology as scheduled     (P) [phone]

## 2021-04-04 NOTE — PROGRESS NOTES
Vancomycin IV Pharmacy-to-Dose Consultation    Hannah Hanna is a 38 y o  male who is currently receiving Vancomycin IV with management by the Pharmacy Consult service  Assessment/Plan:  The patient was reviewed  Patient is on dialysis Monday , Wednesday and Friday , continue current vancomycin dose of 10 mg/kg after dialysis with a plan for Pre-HD level before the 3rd dose on 04/07  We will continue to follow the patients culture results and clinical progress daily      Humera Zambrano, Pharmacist

## 2021-04-04 NOTE — PLAN OF CARE
Problem: Nutrition/Hydration-ADULT  Goal: Nutrient/Hydration intake appropriate for improving, restoring or maintaining nutritional needs  Description: Monitor and assess patient's nutrition/hydration status for malnutrition  Collaborate with interdisciplinary team and initiate plan and interventions as ordered  Monitor patient's weight and dietary intake as ordered or per policy  Utilize nutrition screening tool and intervene as necessary  Determine patient's food preferences and provide high-protein, high-caloric foods as appropriate       INTERVENTIONS:  - Monitor oral intake, urinary output, labs, and treatment plans  - Assess nutrition and hydration status and recommend course of action  - Evaluate amount of meals eaten  - Assist patient with eating if necessary   - Allow adequate time for meals  - Recommend/ encourage appropriate diets, oral nutritional supplements, and vitamin/mineral supplements  - Order, calculate, and assess calorie counts as needed  - Recommend, monitor, and adjust tube feedings and TPN/PPN based on assessed needs  - Assess need for intravenous fluids  - Provide specific nutrition/hydration education as appropriate  - Include patient/family/caregiver in decisions related to nutrition  4/4/2021 1557 by Nette Dunn RN  Outcome: Adequate for Discharge  4/4/2021 0719 by Nette Dunn RN  Outcome: Progressing     Problem: PAIN - ADULT  Goal: Verbalizes/displays adequate comfort level or baseline comfort level  Description: Interventions:  - Encourage patient to monitor pain and request assistance  - Assess pain using appropriate pain scale  - Administer analgesics based on type and severity of pain and evaluate response  - Implement non-pharmacological measures as appropriate and evaluate response  - Consider cultural and social influences on pain and pain management  - Notify physician/advanced practitioner if interventions unsuccessful or patient reports new pain  4/4/2021 1557 by Cece Maya RN  Outcome: Adequate for Discharge  4/4/2021 0719 by Cece Maya RN  Outcome: Progressing     Problem: INFECTION - ADULT  Goal: Absence or prevention of progression during hospitalization  Description: INTERVENTIONS:  - Assess and monitor for signs and symptoms of infection  - Monitor lab/diagnostic results  - Monitor all insertion sites, i e  indwelling lines, tubes, and drains  - Monitor endotracheal if appropriate and nasal secretions for changes in amount and color  - [place] appropriate cooling/warming therapies per order  - Administer medications as ordered  - Instruct and encourage patient and family to use good hand hygiene technique  - Identify and instruct in appropriate isolation precautions for identified infection/condition  4/4/2021 1557 by Cece Maya RN  Outcome: Adequate for Discharge  4/4/2021 0719 by Cece Maya RN  Outcome: Progressing  Goal: Absence of fever/infection during neutropenic period  Description: INTERVENTIONS:  - Monitor WBC    4/4/2021 1557 by Cece Maya RN  Outcome: Adequate for Discharge  4/4/2021 0719 by Cece Maya RN  Outcome: Progressing     Problem: SAFETY ADULT  Goal: Patient will remain free of falls  Description: INTERVENTIONS:  - Assess patient frequently for physical needs  -  Identify cognitive and physical deficits and behaviors that affect risk of falls    -  [place] fall precautions as indicated by assessment   - Educate patient/family on patient safety including physical limitations  - Instruct patient to call for assistance with activity based on assessment  - Modify environment to reduce risk of injury  - Consider OT/PT consult to assist with strengthening/mobility  4/4/2021 1557 by Cece Maya RN  Outcome: Adequate for Discharge  4/4/2021 0719 by Cece Maya RN  Outcome: Progressing  Goal: Maintain or return to baseline ADL function  Description: INTERVENTIONS:  -  Assess patient's ability to carry out ADLs; assess patient's baseline for ADL function and identify physical deficits which impact ability to perform ADLs (bathing, care of mouth/teeth, toileting, grooming, dressing, etc )  - Assess/evaluate cause of self-care deficits   - Assess range of motion  - Assess patient's mobility; develop plan if impaired  - Assess patient's need for assistive devices and provide as appropriate  - Encourage maximum independence but intervene and supervise when necessary  - Involve family in performance of ADLs  - Assess for home care needs following discharge   - Consider OT consult to assist with ADL evaluation and planning for discharge  - Provide patient education as appropriate  4/4/2021 1557 by Cece Maya RN  Outcome: Adequate for Discharge  4/4/2021 0719 by Cece Maya RN  Outcome: Progressing  Goal: Maintain or return mobility status to optimal level  Description: INTERVENTIONS:  - Assess patient's baseline mobility status (ambulation, transfers, stairs, etc )    - Identify cognitive and physical deficits and behaviors that affect mobility  - Identify mobility aids required to assist with transfers and/or ambulation (gait belt, sit-to-stand, lift, walker, cane, etc )  - [place] fall precautions as indicated by assessment  - Record patient progress and toleration of activity level on Mobility SBAR; progress patient to next Phase/Stage  - Instruct patient to call for assistance with activity based on assessment  - Consider rehabilitation consult to assist with strengthening/weightbearing, etc   4/4/2021 1557 by Cece Maya RN  Outcome: Adequate for Discharge  4/4/2021 0719 by Cece Maya RN  Outcome: Progressing     Problem: DISCHARGE PLANNING  Goal: Discharge to home or other facility with appropriate resources  Description: INTERVENTIONS:  - Identify barriers to discharge w/patient and caregiver  - Arrange for needed discharge resources and transportation as appropriate  - Identify discharge learning needs (meds, wound care, etc )  - Arrange for interpretive services to assist at discharge as needed  - Refer to Case Management Department for coordinating discharge planning if the patient needs post-hospital services based on physician/advanced practitioner order or complex needs related to functional status, cognitive ability, or social support system  4/4/2021 1557 by Mallory Shirley RN  Outcome: Adequate for Discharge  4/4/2021 0719 by Mallory Shirley RN  Outcome: Progressing     Problem: Knowledge Deficit  Goal: Patient/family/caregiver demonstrates understanding of disease process, treatment plan, medications, and discharge instructions  Description: Complete learning assessment and assess knowledge base  Interventions:  - Provide teaching at level of understanding  - Provide teaching via preferred learning methods  4/4/2021 1557 by Mallory Shirley RN  Outcome: Adequate for Discharge  4/4/2021 0719 by Mallory Shirley RN  Outcome: Progressing     Problem: Potential for Falls  Goal: Patient will remain free of falls  Description: INTERVENTIONS:  - Assess patient frequently for physical needs  -  Identify cognitive and physical deficits and behaviors that affect risk of falls    -  [place] fall precautions as indicated by assessment   - Educate patient/family on patient safety including physical limitations  - Instruct patient to call for assistance with activity based on assessment  - Modify environment to reduce risk of injury  - Consider OT/PT consult to assist with strengthening/mobility  4/4/2021 1557 by Mallory Shirley RN  Outcome: Adequate for Discharge  4/4/2021 0719 by Mallory Shirley RN  Outcome: Progressing

## 2021-04-05 ENCOUNTER — PATIENT OUTREACH (OUTPATIENT)
Dept: INTERNAL MEDICINE CLINIC | Facility: CLINIC | Age: 36
End: 2021-04-05

## 2021-04-05 DIAGNOSIS — Z71.89 COMPLEX CARE COORDINATION: Primary | ICD-10-CM

## 2021-04-05 LAB
BACTERIA BLD CULT: NORMAL
BACTERIA BLD CULT: NORMAL
MRSA NOSE QL CULT: NORMAL

## 2021-04-06 ENCOUNTER — REMOTE DEVICE CLINIC VISIT (OUTPATIENT)
Dept: CARDIOLOGY CLINIC | Facility: CLINIC | Age: 36
End: 2021-04-06
Payer: MEDICARE

## 2021-04-06 ENCOUNTER — TRANSITIONAL CARE MANAGEMENT (OUTPATIENT)
Dept: INTERNAL MEDICINE CLINIC | Facility: CLINIC | Age: 36
End: 2021-04-06

## 2021-04-06 DIAGNOSIS — Z95.818 PRESENCE OF CARDIAC DEVICE: Primary | ICD-10-CM

## 2021-04-06 PROCEDURE — 93298 REM INTERROG DEV EVAL SCRMS: CPT | Performed by: INTERNAL MEDICINE

## 2021-04-06 PROCEDURE — G2066 INTER DEVC REMOTE 30D: HCPCS | Performed by: INTERNAL MEDICINE

## 2021-04-07 NOTE — PROGRESS NOTES
Results for orders placed or performed in visit on 04/06/21   Cardiac EP device report    Narrative    MDT LOOP  CARELINK TRANSMISSION: BATTERY STATUS "OK"  NO EPISODES  NORMAL DEVICE FUNCTION   PL

## 2021-04-08 ENCOUNTER — PATIENT OUTREACH (OUTPATIENT)
Dept: INTERNAL MEDICINE CLINIC | Facility: CLINIC | Age: 36
End: 2021-04-08

## 2021-04-08 NOTE — PROGRESS NOTES
Received phone call from [place]  He is states he is doing good  No care management needs identified   He has PCP appointment for 4/15/21

## 2021-04-14 NOTE — PROGRESS NOTES
Assessment/Plan:    Problem List Items Addressed This Visit        Cardiovascular and Mediastinum    Renovascular hypertension     -better controlled with recent adjustment of labetalol to 400mg bid  Patient also on cardura 2mg daily, clonidine 0 3mg patch, hydralazine 50mg tid, lisinopril 20mg bid, nifedipine 60mg bid, torsemide 100mg bid and metolazone 10mg daily  -follow up with Nphrology            Musculoskeletal and Integument    Pruritus - Primary     -with open wounds, appear clean    -apply vaseline on clean wounds  -likely due to hyperphosphatemia  -take hydroxyzine prn            Other    Hyperphosphatemia     -likely the cause of pruritis  -reminded to take sevelamer TID as prescribed by [facility]         Cellulitis of right elbow     -resolved  Completed course of cephalexin and doxycycline               Subjective:      Patient ID: José Antonio Chandler is a 38 y o  male  HPI  TCM Call (since 3/15/2021)     Date and time call was made  4/5/2021  2:07 PM    Hospital care reviewed  Records reviewed    Patient was hospitialized at  [address] [place] [address]        Date of Admission  03/31/21    Date of discharge  04/04/21    Diagnosis  Right Elbow Pain    Disposition  Home    Current Symptoms  None      TCM Call (since 3/15/2021)     Post hospital issues  None    Scheduled for follow up? Yes    I have advised the patient to call PCP with any new or worsening symptoms  Pipo Brower CMA    Counseling  Patient    Comments  Patient appointment shceduled for 4/15/21        57-year-old R handed male with DM 1 with ESRD, neuropathy, gastroparesis, renovascular HTN, anemia, vitamin-D deficiency, MDD with history of CVA here for transition of care  He was hospitalized at [facility] 3/31-4/2/21 for right elbow cellulitis  He was initially started on ceftriaxone and vancomycin in the emergency department  He was transition to p o  Keflex on dialysis days and doxycycline all other days    During his hospitalizations his blood pressure medications were adjusted  Labetalol was increased from 300 to 400 mg b i d  Reports home Bp are slowly decreasing  He used to get dizzy when his sbp was in 140s but is tolerating it much anthony  He continues to scratch in the evening and takes hydroxyzine at times  Admits he is not always compliant with his phosphate binders  He denies fever  Elbow is a little sore and his ROM has improved  He completed course of abx  He developed diarrhea but feels it is tapering off  Denies abdominal pain, vomiting      The following portions of the patient's history were reviewed and updated as appropriate: allergies, current medications, past family history, past medical history, past social history, past surgical history and problem list       Current Outpatient Medications:     Admelog SoloStar 100 units/mL injection pen, , Disp: , Rfl:     aspirin 81 mg chewable tablet, Chew 81 mg daily, Disp: , Rfl:     atorvastatin (LIPITOR) 40 mg tablet, Take 1 tablet (40 mg total) by mouth every evening, Disp: 90 tablet, Rfl: 1    b complex vitamins capsule, Take 1 capsule by mouth daily before lunch , Disp: , Rfl:     B-D ULTRAFINE III SHORT PEN 31G X 8 MM MISC, USE 1 PEN NEEDLE 8 TIMES DAILY, Disp: 100 each, Rfl: 47    calcium acetate (PHOSLO) capsule, TAKE 3 CAPSULES BY MOUTH WITH MEALS, Disp: , Rfl:     cholecalciferol (VITAMIN D3) 1,000 units tablet, Take 1,000 Units by mouth daily, Disp: , Rfl:     cinacalcet (SENSIPAR) 60 MG tablet, Take 60 mg by mouth daily Non-dialysis days, Disp: , Rfl:     cloNIDine (CATAPRES-TTS-2) 0 2 mg/24 hr, , Disp: , Rfl:     doxazosin (CARDURA) 2 mg tablet, Take 2 mg by mouth daily after lunch, Disp: , Rfl:     doxazosin (CARDURA) 8 MG tablet, Take 8 mg by mouth daily at bedtime, Disp: , Rfl:     escitalopram (LEXAPRO) 10 mg tablet, Take 1 tablet (10 mg total) by mouth daily, Disp: 90 tablet, Rfl: 2    famotidine (PEPCID) 40 MG tablet, Take 1 tablet (40 mg total) by mouth 2 (two) times a day, Disp: 60 tablet, Rfl: 5    gabapentin (NEURONTIN) 100 mg capsule, Take 2 tablets at bedtime (Patient taking differently: 200 mg Take 2 tablets at bedtime), Disp: 60 capsule, Rfl: 5    GLUCAGON EMERGENCY 1 MG injection, INJECT 1MG SUBCUTANEOUSLY AS NEEDED (AS DIRECTED)   MAY REPEAT DOSE EVERY 20 MINUTES AS NEEDED, Disp: , Rfl: 3    hydrALAZINE (APRESOLINE) 100 MG tablet, Take 0 5 tablets (50 mg total) by mouth 2 (two) times a day (Patient taking differently: Take 50 mg by mouth 3 (three) times a day ), Disp: 90 tablet, Rfl: 1    HYDROXYZINE HCL PO, Take by mouth, Disp: , Rfl:     insulin glargine (Basaglar KwikPen) 100 units/mL injection pen, Inject 14 Units under the skin daily at bedtime (Patient taking differently: Inject 15 Units under the skin daily at bedtime ), Disp: , Rfl:     insulin lispro (HumaLOG) 100 units/mL injection, Inject 4 Units under the skin 3 (three) times a day with meals, Disp: 10 mL, Rfl: 0    labetalol (NORMODYNE) 200 mg tablet, Take 2 tablets (400 mg total) by mouth 2 (two) times a day, Disp: 30 tablet, Rfl: 0    lisinopril (ZESTRIL) 20 mg tablet, Take 1 tablet (20 mg total) by mouth daily (Patient taking differently: Take 20 mg by mouth 2 (two) times a day ), Disp: 30 tablet, Rfl: 0    Lokelma 5 g PACK, 2 (two) times a day , Disp: , Rfl:     loperamide (IMODIUM) 2 mg capsule, Take 1 capsule (2 mg total) by mouth 3 (three) times a day as needed for diarrhea, Disp: 30 capsule, Rfl: 0    Metoclopramide HCl (REGLAN PO), Take by mouth Unknown dose, Disp: , Rfl:     metolazone (ZAROXOLYN) 10 mg tablet, Take 10 mg by mouth daily, Disp: , Rfl:     minoxidil (LONITEN) 2 5 mg tablet, TAKE 1 2 (ONE HALF) TABLET BY MOUTH TWICE DAILY, Disp: , Rfl:     NIFEdipine ER (ADALAT CC) 60 MG 24 hr tablet, Take 1 tablet (60 mg total) by mouth 2 (two) times a day, Disp: 180 tablet, Rfl: 0    ondansetron (ZOFRAN) 4 mg tablet, Take 4 mg by mouth every 8 (eight) hours as needed for nausea or vomiting, Disp: , Rfl:     saccharomyces boulardii (FLORASTOR) 250 mg capsule, Take 1 capsule (250 mg total) by mouth 2 (two) times a day, Disp: 60 capsule, Rfl: 0    sevelamer (RENAGEL) 800 mg tablet, Take 3 tablets (2,400 mg total) by mouth 3 (three) times a day with meals, Disp: 270 tablet, Rfl: 0    sevelamer carbonate (Renvela) 800 mg tablet, 3 (three) times a day with meals , Disp: , Rfl:     torsemide (DEMADEX) 100 mg tablet, Take 100 mg by mouth 2 (two) times a day , Disp: , Rfl:     polyethylene glycol (GOLYTELY) 4000 mL solution, Take 4,000 mL by mouth once for 1 dose, Disp: 4000 mL, Rfl: 0    Review of Systems   Constitutional: Positive for activity change  Negative for appetite change, fatigue and unexpected weight change  Respiratory: Negative for cough, chest tightness, shortness of breath and wheezing  Cardiovascular: Negative for chest pain, palpitations and leg swelling  Gastrointestinal: Positive for diarrhea  Negative for abdominal pain, blood in stool, constipation and vomiting  Genitourinary: Positive for difficulty urinating  Musculoskeletal: Positive for arthralgias and gait problem  Negative for joint swelling  Skin: Positive for color change, rash and wound  Negative for pallor  Neurological: Positive for dizziness and numbness  Negative for weakness and headaches  Objective:    /80 (BP Location: Right arm, Patient Position: Sitting)   Pulse 80   Temp 97 8 °F (36 6 °C) (Skin)   Resp 16   Ht 5' 11" (1 803 m)   Wt 98 4 kg (217 lb)   SpO2 98%   BMI 30 27 kg/m²      Physical Exam  Vitals signs reviewed  Constitutional:       General: He is not in acute distress  Appearance: Normal appearance  He is obese  Cardiovascular:      Rate and Rhythm: Normal rate and regular rhythm  Pulses: Normal pulses  Heart sounds: Normal heart sounds     Pulmonary:      Effort: Pulmonary effort is normal  No respiratory distress  Breath sounds: Normal breath sounds  No wheezing  Musculoskeletal:      Right elbow: He exhibits normal range of motion, no swelling, no effusion and no deformity  No tenderness found  Comments: Ambulates with a cane   Skin:     General: Skin is warm  Comments: Multiple excoriations on L forearm and R lower leg with few open wounds, some are crusted   Neurological:      Mental Status: He is alert and oriented to person, place, and time  Psychiatric:         Attention and Perception: Attention normal          Mood and Affect: Affect normal  Mood is anxious  Speech: Speech normal          Behavior: Behavior is cooperative

## 2021-04-15 ENCOUNTER — OFFICE VISIT (OUTPATIENT)
Dept: INTERNAL MEDICINE CLINIC | Facility: CLINIC | Age: 36
End: 2021-04-15
Payer: MEDICARE

## 2021-04-15 ENCOUNTER — OFFICE VISIT (OUTPATIENT)
Dept: PSYCHIATRY | Facility: CLINIC | Age: 36
End: 2021-04-15
Payer: MEDICARE

## 2021-04-15 VITALS
TEMPERATURE: 97.8 F | SYSTOLIC BLOOD PRESSURE: 132 MMHG | DIASTOLIC BLOOD PRESSURE: 80 MMHG | HEART RATE: 80 BPM | HEIGHT: 71 IN | RESPIRATION RATE: 16 BRPM | WEIGHT: 217 LBS | OXYGEN SATURATION: 98 % | BODY MASS INDEX: 30.38 KG/M2

## 2021-04-15 DIAGNOSIS — I15.0 RENOVASCULAR HYPERTENSION: ICD-10-CM

## 2021-04-15 DIAGNOSIS — L29.9 PRURITUS: Primary | ICD-10-CM

## 2021-04-15 DIAGNOSIS — L03.113 CELLULITIS OF RIGHT ELBOW: ICD-10-CM

## 2021-04-15 DIAGNOSIS — E83.39 HYPERPHOSPHATEMIA: ICD-10-CM

## 2021-04-15 DIAGNOSIS — F32.A DEPRESSIVE DISORDER: Primary | ICD-10-CM

## 2021-04-15 PROBLEM — F32.1 CURRENT MODERATE EPISODE OF MAJOR DEPRESSIVE DISORDER WITHOUT PRIOR EPISODE (HCC): Status: RESOLVED | Noted: 2019-03-08 | Resolved: 2021-04-15

## 2021-04-15 PROBLEM — Z79.899 MEDICAL CANNABIS USE: Status: ACTIVE | Noted: 2021-04-15

## 2021-04-15 PROCEDURE — 99495 TRANSJ CARE MGMT MOD F2F 14D: CPT | Performed by: INTERNAL MEDICINE

## 2021-04-15 PROCEDURE — 99215 OFFICE O/P EST HI 40 MIN: CPT | Performed by: STUDENT IN AN ORGANIZED HEALTH CARE EDUCATION/TRAINING PROGRAM

## 2021-04-15 NOTE — PSYCH
53 Alethea Tay    Name and Date of Birth: Álvaro Mckeon 38 y o  1985 MRN: [id number]    Date of Visit: April 15, 2021    Reason for visit: Full psychiatric intake assessment for medication management     MASSIMO Flores is a 38 y o  male with a past psychiatric history significant for depression and anxiety and PMH significant for ESRD, DM, HTN, and 2 prior CVAs who presents to the [address] [place] [address] E outpatient clinic for intake assessment  Hien Umanzor presents as mildly anxious yet pleasant and cooperative  His thoughts are organized, linear, and goal-directed and he completes psychiatric assessment without difficulty  Stewartanitha Oliver endorses an extensive medical history complicated by 2 cerebral vascular accidents in 2015 and 2018  Stewart Benito states that the etiology of these CVAs is unknown but suspected to be secondary to unmanaged diabetes mellitus, hypertension, and history of nicotine use  Stewart Benito states that his second CVA in 2018 was far more debilitating than the first and result in significant impairment in occupational and social functionality  He now ambulates slowly with a walking cane and is on disability  He is no longer independent and requires transportation assistance via family  He also had to move back in with his parents which has been distressing  Over the last few years, Stewartanitha Oliver states that he was placed on the [facility] renal transplant list and was awaiting a harvested kidney  In October 2020, in the context of familial discord and recent verbal disagreement with mother, Stewart Oliver voiced vague and fleeting thoughts of self-harm to his dialysis nurse  She was perturbed by these statements and referred him to his PCP  While being evaluated by his PCP, Stewart Oliver was then sent to the ED where he was ultimately given psychiatric resources in the community and discharged home   Unfortunately, as a result of this incident, Ethel Shearer was removed from the transplant list for "1 year"  This is particularly disconcerting as Ethel Shearer did not actually harm himself or engage in any self-destructive behavior  He was transparent with his treatment team and emotional raw after a disagreement with his mother  By undergoing dialysis at the exact moment he voiced these concerns to his nurse, he was seeking treatment and thus, acting in a self-preserving manner, suggestive of a will to live  During today's evaluation, Ethel Shearer is frustrated regarding being removed from the renal transplant process and is seeking transplant via [facility]  He is set to have intake session with [facility] team on May 3rd  His frustration regarding this process again, is representative of future-orientation and a will to live  Historically, following his second CVA, Ethel Shearer was managed with Lexapro without much benefit  He presents to the clinic today endorsing mood stability, with episodic periods of depression and anxiety  However, Lyudmila Bonny denies most neurovegetative symptomatology suggestive of major depressive disorder or dysthymia  Lyudmilarochelle Draper does admit to fragmented or non-restorative sleep that ultimately contributes to anergia and amotivation  Lyudmilarochelle Draper endorses robust appetite and adequate concentration/memory and denies new-onset forgetfulness or inattentiveness  Lyudmilarochelle Draper does not experience daily crying spells or limited pleasure in activities previously found pleasurable  He has lost his sense of connectivity and purpose as a result of his CVAs and physical limitations, however, he is engaging in therapy and now medication management to regain these  Lyudmilarochelle Draper adamantly denies acute thoughts of suicide or self-harm  Lyudmilarochelle Draper has no plans to harm others  There is no documented history of prior suicidal gestures or suicidal attempts  Lyudmilarochelle Draper denies historical non-suicidal self injurious behavior   Lyudmilarochelle Draper is future-oriented and demonstrates self preservation as evidenced by today's evaluation in which Jermaine Benites is seeking psychiatric intervention to improve overall mental health and outlook on life  Jermaine Benites denies a pervasive history of worthlessness, hopelessness, or guilt  PHQ-9 score obtained during today's visit was 11  At the time of today's evaluation, Rianna Escalante denies symptomatology suggestive of pathologic or overwhelming anxiety  He does not experience daily or weekly panic attacks  He admits to mild nervousness and fearfulness regarding his health, which is appropriate  He does not feel on-edge, restless, or perpetually irritable  Jermaine Benites vehemently denies any acute or chronic history suggestive of an underlying affective (bipolar) organization  Jermaine Benites denies previous episodes of elevated/expansive mood, lengthy periods without sleep, grandiosity, or intense and prolonged irritability  Jermaine Benites denies atypical periods of increased goal-directed behavior, excessive spending, or sexual promiscuity  During today's evaluation, Jermaine Benites does not exhibit objective evidence of hypomania/aziza  Jermaine Benites is mostly organized in thought without flight of ideas or loosening of associations  Speech does not appear to be pressured or rapid and Jermaine Benites responds well to verbal redirecting  Jermaine Benites denies historical symptomatology suggestive of an underlying psychotic process  Jermaine Benites does not currently endorse acute perceptual disturbances such as A/V hallucinations, paranoia, referential ideation, or delusions  Jermaine Benites denies acute and chronic Schneiderian symptoms, including: thought-broadcasting, thought-insertion, thought-withdrawal or audible thoughts  During today's evaluation, Jermaine Benites does not exhibit objective evidence of caprice psychosis as the patient does not appear internally preoccupied or easily distracted  Mateus's thoughts are organized, linear, and reality-based  Jermaine Benites denies historical symptomatology suggestive of PTSD, OCD, or disordered eating         Current Rating Scores:     Current PHQ-9   PHQ-9 Score (since 3/15/2021)     PHQ-9 Score  11        Current ELIAS-7 is   ELIAS-7 Flowsheet Screening      Most Recent Value   Over the last two weeks, how often have you been bothered by the following problems? Feeling nervous, anxious, or on edge  1   Not being able to stop or control worrying  1   Worrying too much about different things  1   Trouble relaxing   1   Being so restless that it's hard to sit still  2   Becoming easily annoyed or irritable   1   Feeling afraid as if something awful might happen  1   How difficult have these problems made it for you to do your work, take care of things at home, or get along with other people? Somewhat difficult   ELIAS Score   8            Psychiatric Review Of Systems:    Sleep changes: yes, decreased  Appetite changes: no  Weight changes: no  Energy/anergy: yes, decreased  Interest/pleasure/anhedonia: no  Somatic symptoms: no  Anxiety/panic: yes, worrying  Magda: no  Guilty/hopeless: no  Self injurious behavior/risky behavior: no  Suicidal ideation: no  Homicidal ideation: no  Auditory hallucinations: no  Visual hallucinations: no  Other hallucinations: no  Delusional thinking: no  Eating disorder history: no  Obsessive/compulsive symptoms: no    Review Of Systems:    Constitutional feeling tired, low energy and as noted in HPI   ENT negative   Cardiovascular negative   Respiratory negative   Gastrointestinal abdominal discomfort   Genitourinary negative   Musculoskeletal negative   Integumentary negative   Neurological headache, unstable gait and slow gait   Endocrine negative   Other Symptoms none, all other systems are negative       Family Psychiatric History:     Family History   Problem Relation Age of Onset    Breast cancer Mother     Hypertension Mother     Hyperlipidemia Father     Hypertension Father     Leukemia Maternal Grandmother     Hyperlipidemia Maternal Grandfather     Hypertension Maternal Grandfather     Hyperlipidemia Paternal Grandmother     Hypertension Paternal Grandmother     Heart disease Paternal Grandfather         cardiac disorder    Diabetes Paternal Grandfather          Past Psychiatric History:     Inpatient psychiatric admissions: Denies  Prior outpatient psychiatric linkage: Denies  Past/current psychotherapy: Currently linked with Sandra Bourgeois   History of suicidal attempts/gestures: Denies  History of violence/aggressive behaviors: Denies  Psychotropic medication trials: Lexapro (3 years ago)  Substance abuse inpatient/outpatient rehabilitation: Denies    Substance Abuse History:    No history of ETOH or illict substance abuse  Shawanda Rey does endorse previous tobacco abuse and current medical cannabis prescription  No past legal actions or arrests secondary to substance intoxication  The patient denies prior DWIs/DUIs  No history of outpatient/inpatient rehabilitation programs  Madhavi Meléndez does not exhibit objective evidence of substance withdrawal during today's examination nor does Madhavi Meléndez appear under the influence of any psychoactive substance  Social History:    Developmental: Denies a history of milestone/developmental delay  Denies a history of in-utero exposure to toxins/illicit substances  There is no documented history of IEP or need for special education  He was born and raised in [place], [place]  He lived there for 27 years  He moved to the [facility] in 2011 to live with his sister and work as   His parents have since relocated and he now lives with them  Education: college graduate - [employer] class of 2006 - degree in theatre   Marital history: single  Living arrangement, social support: parents - has a sister who has children (nephews and nieces)   Occupational History: on permanent disability  Access to firearms: Denies direct access to weapons/firearms  Edwin Sands has no history of arrests or violence with a deadly weapon       Traumatic History:     Abuse:none is reported  Other Traumatic Events: 2 CVAs - 2015 and 2018    Past Medical History:    Past Medical History:   Diagnosis Date    [facility]     Ambulates with cane     Anuria     Anxiety     Chronic kidney disease     Depression     Diabetes mellitus ([facility]     Diarrhea     Falls     Gastroparesis     GERD (gastroesophageal reflux disease)     History of shingles 2010    History of transfusion 02/2018    no adverse reaction    Hyperlipidemia     Hyperphosphatemia     Hypertension     Itching     Muscle weakness     general unsteadiness    Retinopathy     Seizures ([facility] [address] [facility]     early 2020 - one time    Skin abnormality     some dime size areas where skin was scratched from itching    [facility]     x2 - off balance/no driving/fatigue    Swelling of both lower extremities     Vomiting     Wears glasses      No past medical history pertinent negatives  Past Surgical History:   Procedure Laterality Date    CARDIAC LOOP RECORDER  05/2018    COLONOSCOPY      EGD      EYE SURGERY Right     IR AV FISTULAGRAM/GRAFTOGRAM  2/23/2021    IR TUNNELED CENTRAL LINE PLACEMENT  2/16/2021    IR TUNNELED DIALYSIS CATHETER PLACEMENT  11/18/2020    IR TUNNELED DIALYSIS CATHETER REMOVAL  2/12/2021    IR TUNNELED DIALYSIS CATHETER REMOVAL  3/11/2021    PERITONEAL CATHETER INSERTION N/A 8/27/2018    Procedure: UNROOF PD CATHETER;  Surgeon: Agustina Priest DO;  [facility]: [facility];  Service: General    CO ANASTOMOSIS,AV,ANY SITE Left 11/9/2020    Procedure: CREATION FISTULA  ARTERIOVENOUS (AV) - LEFT WRIST;  Surgeon: Valente Reardon MD;  [facility]: [facility];  Service: Vascular    CO ESOPHAGOGASTRODUODENOSCOPY TRANSORAL DIAGNOSTIC N/A 4/18/2019    Procedure: ESOPHAGOGASTRODUODENOSCOPY (EGD); Surgeon: Sheyla Paige MD;  Location: [facility];   Service: Gastroenterology    CO LAP INSERTION TUNNELED INTRAPERITONEAL CATHETER N/A 8/6/2018    Procedure: LAPAROSCOPIC PD CATHETER PLACEMENT;  Surgeon: Nora Boyle DO;  [facility];  Service: General    OR REMOVAL TUNNELED INTRAPERITONEAL CATHETER N/A 11/18/2020    Procedure: REMOVAL CATHETER PERITONEAL DIALYSIS;  Surgeon: Ninfa Tobar MD;  [facility];  Service: General    TONSILLECTOMY      UPPER GASTROINTESTINAL ENDOSCOPY       Allergies   Allergen Reactions    Sulfa Antibiotics Rash       History Review: The following portions of the patient's history were reviewed and updated as appropriate: allergies, current medications, past family history, past medical history, past social history, past surgical history and problem list     OBJECTIVE:    Vital signs in last 24 hours: There were no vitals filed for this visit      Mental Status Evaluation:    Appearance age appropriate, casually dressed, dressed appropriately, looks older than stated age, wearing a hat   Behavior pleasant, cooperative, mildly anxious, good eye contact   Speech normal rate, normal volume, normal pitch, clear, coherent   Mood dysphoric, anxious   Affect constricted   Thought Processes organized, logical, goal directed, normal rate of thoughts, normal abstract reasoning   Associations intact associations   Thought Content no overt delusions   Perceptual Disturbances: no auditory hallucinations, no visual hallucinations   Abnormal Thoughts  Risk Potential Suicidal ideation - None  Homicidal ideation - None  Potential for aggression - No   Orientation oriented to person, place, time/date and situation   Memory recent and remote memory grossly intact   Consciousness alert and awake   Attention Span Concentration Span attention span and concentration are age appropriate   Intellect appears to be of average intelligence   Insight good   Judgement intact and good   Muscle Strength and  Gait decreased muscle strength, slow gait, unstable gait, uses cane   Motor Activity no abnormal movements   Language no difficulty naming common objects, no difficulty repeating a phrase   Fund of Knowledge adequate knowledge of current events  adequate fund of knowledge regarding past history  adequate fund of knowledge regarding vocabulary    Pain none   Pain Scale 0       Laboratory Results: I have personally reviewed all pertinent laboratory/tests results    Recent Labs (last 2 months): Admission on 03/31/2021, Discharged on 04/04/2021   Component Date Value    WBC 03/31/2021 6 36     RBC 03/31/2021 4 07     Hemoglobin 03/31/2021 12 7     Hematocrit 03/31/2021 40 8     MCV 03/31/2021 100*    MCH 03/31/2021 31 2     MCHC 03/31/2021 31 1*    RDW 03/31/2021 15 1     MPV 03/31/2021 9 7     Platelets 11/99/1474 265     nRBC 03/31/2021 0     Neutrophils Relative 03/31/2021 65     Immat GRANS % 03/31/2021 0     Lymphocytes Relative 03/31/2021 15     Monocytes Relative 03/31/2021 8     Eosinophils Relative 03/31/2021 11*    Basophils Relative 03/31/2021 1     Neutrophils Absolute 03/31/2021 4 11     Immature Grans Absolute 03/31/2021 0 01     Lymphocytes Absolute 03/31/2021 0 95     Monocytes Absolute 03/31/2021 0 52     Eosinophils Absolute 03/31/2021 0 70*    Basophils Absolute 03/31/2021 0 07     Sodium 03/31/2021 140     Potassium 03/31/2021 5 3     Chloride 03/31/2021 98*    CO2 03/31/2021 30     ANION GAP 03/31/2021 12     BUN 03/31/2021 22     Creatinine 03/31/2021 6 60*    Glucose 03/31/2021 243*    Calcium 03/31/2021 8 9     AST 03/31/2021 15     ALT 03/31/2021 14     Alkaline Phosphatase 03/31/2021 89     Total Protein 03/31/2021 6 9     Albumin 03/31/2021 3 8     Total Bilirubin 03/31/2021 0 59     eGFR 03/31/2021 10     Blood Culture 03/31/2021 No Growth After 5 Days   Blood Culture 03/31/2021 No Growth After 5 Days       CRP 03/31/2021 25 0*    Sed Rate 03/31/2021 10     POC Glucose 03/31/2021 254*    Sodium 04/01/2021 142     Potassium 04/01/2021 4 4     Chloride 04/01/2021 103     CO2 04/01/2021 19*    ANION GAP 04/01/2021 20*  BUN 04/01/2021 32*    Creatinine 04/01/2021 8 32*    Glucose 04/01/2021 240*    Glucose, Fasting 04/01/2021 240*    Calcium 04/01/2021 9 0     eGFR 04/01/2021 7     WBC 04/01/2021 7 87     RBC 04/01/2021 4 02     Hemoglobin 04/01/2021 12 7     Hematocrit 04/01/2021 39 8     MCV 04/01/2021 99*    MCH 04/01/2021 31 6     MCHC 04/01/2021 31 9     RDW 04/01/2021 15 0     MPV 04/01/2021 9 6     Platelets 90/24/9417 245     nRBC 04/01/2021 0     Neutrophils Relative 04/01/2021 64     Immat GRANS % 04/01/2021 0     Lymphocytes Relative 04/01/2021 17     Monocytes Relative 04/01/2021 8     Eosinophils Relative 04/01/2021 10*    Basophils Relative 04/01/2021 1     Neutrophils Absolute 04/01/2021 5 05     Immature Grans Absolute 04/01/2021 0 02     Lymphocytes Absolute 04/01/2021 1 32     Monocytes Absolute 04/01/2021 0 62     Eosinophils Absolute 04/01/2021 0 78*    Basophils Absolute 04/01/2021 0 08     Procalcitonin 04/01/2021 2 74*    POC Glucose 04/01/2021 155*    POC Glucose 04/01/2021 102     Procalcitonin 04/02/2021 1 86*    POC Glucose 04/01/2021 150*    POC Glucose 04/01/2021 196*    WBC 04/02/2021 6 87     RBC 04/02/2021 3 98     Hemoglobin 04/02/2021 12 6     Hematocrit 04/02/2021 39 1     MCV 04/02/2021 98     MCH 04/02/2021 31 7     MCHC 04/02/2021 32 2     RDW 04/02/2021 14 8     MPV 04/02/2021 10 2     Platelets 30/50/6416 244     nRBC 04/02/2021 0     Neutrophils Relative 04/02/2021 62     Immat GRANS % 04/02/2021 0     Lymphocytes Relative 04/02/2021 15     Monocytes Relative 04/02/2021 10     Eosinophils Relative 04/02/2021 11*    Basophils Relative 04/02/2021 2*    Neutrophils Absolute 04/02/2021 4 22     Immature Grans Absolute 04/02/2021 0 03     Lymphocytes Absolute 04/02/2021 1 02     Monocytes Absolute 04/02/2021 0 71     Eosinophils Absolute 04/02/2021 0 78*    Basophils Absolute 04/02/2021 0 11*    Sodium 04/02/2021 139     Potassium 04/02/2021 6 0*    Chloride 04/02/2021 101     CO2 04/02/2021 21     ANION GAP 04/02/2021 17*    BUN 04/02/2021 50*    Creatinine 04/02/2021 11 29*    Glucose 04/02/2021 217*    Calcium 04/02/2021 8 9     eGFR 04/02/2021 5     POC Glucose 04/02/2021 200*    POC Glucose 04/02/2021 181*    POC Glucose 04/02/2021 232*    POC Glucose 04/02/2021 84     POC Glucose 04/03/2021 269*    Sodium 04/03/2021 139     Potassium 04/03/2021 4 8     Chloride 04/03/2021 97*    CO2 04/03/2021 26     ANION GAP 04/03/2021 16*    BUN 04/03/2021 41*    Creatinine 04/03/2021 8 42*    Glucose 04/03/2021 324*    Calcium 04/03/2021 8 9     eGFR 04/03/2021 7     WBC 04/03/2021 5 74     RBC 04/03/2021 4 30     Hemoglobin 04/03/2021 13 7     Hematocrit 04/03/2021 41 8     MCV 04/03/2021 97     MCH 04/03/2021 31 9     MCHC 04/03/2021 32 8     RDW 04/03/2021 14 6     Platelets 69/69/4802 262     MPV 04/03/2021 9 9     POC Glucose 04/03/2021 255*    MRSA Culture Only 04/03/2021 No Methicillin Resistant Staphlyococcus aureus (MRSA) isolated     POC Glucose 04/03/2021 227*    POC Glucose 04/03/2021 197*    POC Glucose 04/03/2021 231*    POC Glucose 04/04/2021 162*    POC Glucose 04/04/2021 155*   Hospital Outpatient Visit on 02/23/2021   Component Date Value    POC Glucose 02/23/2021 216*   Lab Requisition on 02/19/2021   Component Date Value    Potassium 02/19/2021 6 [address] [place] [address] Outpatient Visit on 02/16/2021   Component Date Value    POC Glucose 02/16/2021 257*       Suicide/Homicide Risk Assessment:    Risk of Harm to Self:  The following ratings are based on assessment at the time of the interview and review of records  Demographic risk factors include: , never , male  Historical Risk Factors include: none  Recent Specific Risk Factors include: chronic health problems  Protective Factors: no current suicidal ideation, ability to adapt to change, able to manage anger well, access to mental health treatment, compliant with medications, compliant with mental health treatment, contact with caregivers, effective coping skills, effective decision-making skills, effective problem solving skills, good self-esteem, having a desire to be alive, having a sense of purpose or meaning in life, healthy fear of risky behaviors and pain, impulse control, medical compliance, no substance use problems, personal beliefs about the meaning and value of life, resiliency, restricted access to lethal means, stable living environment, sense of determination, sense of importance of health and wellness, sense of personal control, supportive family, supportive friends  Weapons: none  The following steps have been taken to ensure weapons are properly secured: not applicable  Based on today's assessment, Mariano Dixon presents the following risk of harm to self: none    Risk of Harm to Others: The following ratings are based on assessment at the time of the interview and review of records  Demographic Risk Factors include: male, under age 34  Historical Risk Factors include: none  Recent Specific Risk Factors include: none  Protective Factors: no current homicidal ideation  Weapons: none  The following steps have been taken to ensure weapons are properly secured: not applicable  Based on today's assessment, Mariano Dixon presents the following risk of harm to others: none    The following interventions are recommended: no intervention changes needed  At the moment, patient's acute lethality risk is LOW  Mariano Dixon is future-oriented, forward-thinking, and demonstrates ability to act in a self-preserving manner as evidenced by volitionally presenting to the clinic today, seeking treatment  He is compliant with all medications  Additionally, Mariano Dixon sits throughout the assessment wearing personal protective gear (ie mask) in the context of an ongoing viral pandemic, suggesting a will and desire to live   Miriam Phipps has no prior suicidal attempts/gestures  He is committed to acquiring a renal transplant and is willing to commute to [place]  He has no current ETOH or illicit substance abuse  He has no access to firearms/weapons  At this juncture, inpatient hospitalization is not currently warranted  To mitigate future risk, patient should adhere to treatment recommendations, avoid alcohol/illicit substance use, utilize community-based resources and familiar support, and prioritize mental health treatment  Assessment/Plan:     Perry Nayak is a 38 y o  male with a past psychiatric history significant for depression and anxiety and PMH significant for ESRD, DM, HTN, and 2 prior CVAs who presents to the [address] [place] [address] E outpatient clinic for intake assessment  Gama Edge endorses an extensive medical history complicated by 2 cerebral vascular accidents in 2015 and 2018  Gama Edge states that the etiology of these CVAs is unknown but suspected to be secondary to unmanaged diabetes mellitus, hypertension, and history of nicotine use  Gama Edge states that his second CVA in 2018 was far more debilitating than the first and result in significant impairment in occupational and social functionality  He now ambulates slowly with a walking cane and is on disability  He is no longer independent and requires transportation assistance via family  He also had to move back in with his parents which has been distressing  Over the last few years, Gama Edge states that he was placed on the [facility] renal [facility] list and was awaiting a harvested kidney  In October 2020, in the context of familial discord and recent verbal disagreement with mother, Gama Edge voiced vague and fleeting thoughts of self-harm to his dialysis nurse  She was perturbed by these statements and referred him to his PCP  While being evaluated by his PCP, Gama Egde was then sent to the ED where he was ultimately given psychiatric resources in the community and discharged home   Unfortunately, as a result of this incident, Chanell Dumas was removed from the transplant list for "1 year"  This is particularly disconcerting as Chanell Dumas did not actually harm himself or engage in any self-destructive behavior  He was transparent with his treatment team and emotional raw after a disagreement with his mother  By undergoing dialysis at the exact moment he voiced these concerns to his nurse, he was seeking treatment and thus, acting in a self-preserving manner, suggestive of a will to live  During today's evaluation, Chanell Dumas is frustrated regarding being removed from the renal transplant process and is seeking transplant via [facility]  He is set to have intake session with [facility] team on May 3rd  His frustration regarding this process again, is representative of future-orientation and a will to live  Historically, following his second CVA, Chanell Dumas was managed with Lexapro without much benefit  He presents to the clinic today endorsing mood stability, with episodic periods of depression and anxiety  However, So Sherman denies most neurovegetative symptomatology suggestive of major depressive disorder or dysthymia  So Sherman does admit to fragmented or non-restorative sleep that ultimately contributes to anergia and amotivation  He has lost his sense of connectivity and purpose as a result of his CVAs and physical limitations, however, he is engaging in therapy and now medication management to regain these  So Sherman adamantly denies acute thoughts of suicide or self-harm  PHQ-9 score obtained during today's visit was 11  At the time of today's evaluation, Chanell Dumas denies symptomatology suggestive of pathologic or overwhelming anxiety  He does not experience daily or weekly panic attacks  He admits to mild nervousness and fearfulness regarding his health, which is appropriate  He does not feel on-edge, restless, or perpetually irritable   So Sherman vehemently denies any acute or chronic history suggestive of an underlying affective (bipolar) organization  illiam denies historical symptomatology suggestive of an underlying psychotic process  Psychopharmacologically, I spoke at length with Cash Linnea about medication options available to manage episodic bouts of anxiety and baseline dysphoria  His PHQ-9 score is objectively representative of moderate depressive, although Paulo subjectively feels stable  I suspect given his extensive and chronic medical comorbidities, Paulo's baseline is more depressogenic in nature  Although he does not meet full criteria for MDD, treatment with antidepressant will likely serve to improve mood and functionality  Risks/benefits/alternativies to treatment discussed, including a myriad of potential adverse medication side effects, to which Radha Golden voiced understanding and consented fully to treatment         DSM-V Diagnoses:     1 ) Unspecified Depressive Disorder   - R/O MDD  2 ) Medical Cannabis Use      Treatment Recommendations/Precautions:      1 ) Unspecified Depressive Disorder   - R/O MDD  - Start Sertraline 25mg QHS for 1 week - increase to 50mg by week 2   - Sertraline was chosen as it is often well tolerated in those with ESRD and does not require renal adjusting   - Continue engagement in individual psychotherapy with Isatu Gui   - Psychoeducation provided regarding the importance of exercise and healthy dietary choices and their impact on mood, energy, and motivation  - Counseled to avoid ETOH, illict substances, and nicotine secondary to the detrimental effects of these substances on mental and physical health  - Encouraged to engage in non-verbal forms of therapy such as art therapy, music therapy, and mindfulness  - Discussed the bio-psycho-social model to treatment and therapeutic exercises/interventions were attempted to cognitively restructure thoughts      2 ) Medical Cannabis Use  - Counseled to avoid ETOH, illict substances, and nicotine secondary to the detrimental effects of these substances on mental and physical health  - No concerns for misuse     Medication management every 7 weeks  Continue psychotherapy with [facility] therapist Mark Holt  Aware of need to follow up with family physician for medical issues  Aware of 24 hour and weekend coverage for urgent situations accessed by calling [facility] main practice number    Medications Risks/Benefits:      Risks, Benefits And Possible Side Effects Of Medications:    Risks, benefits, and possible side effects of medications explained to Greta Courts including risk of suicidality and serotonin syndrome related to treatment with antidepressants  He verbalizes understanding and agreement for treatment  Controlled Medication Discussion:     No recent records found for controlled prescriptions according to [place] Prescription Drug Monitoring Program    Treatment Plan:    Completed and signed during the session: Not applicable - Treatment Plan to be completed by [address] [facility] [address] therapist      Note Share Disclaimer:      This note was not shared with the patient due to reasonable likelihood of causing patient harm      Lydia Lafleur MD 04/15/21

## 2021-04-15 NOTE — ASSESSMENT & PLAN NOTE
-with open wounds, appear clean    -apply vaseline on clean wounds  -likely due to hyperphosphatemia  -take hydroxyzine prn

## 2021-04-15 NOTE — ASSESSMENT & PLAN NOTE
-better controlled with recent adjustment of labetalol to 400mg bid    Patient also on cardura 2mg daily, clonidine 0 3mg patch, hydralazine 50mg tid, lisinopril 20mg bid, nifedipine 60mg bid, torsemide 100mg bid and metolazone 10mg daily  -follow up with Nphrology

## 2021-04-19 ENCOUNTER — TELEPHONE (OUTPATIENT)
Dept: PSYCHIATRY | Facility: CLINIC | Age: 36
End: 2021-04-19

## 2021-04-28 ENCOUNTER — TELEPHONE (OUTPATIENT)
Dept: PSYCHIATRY | Facility: CLINIC | Age: 36
End: 2021-04-28

## 2021-04-28 RX ORDER — ESCITALOPRAM OXALATE 10 MG/1
10 TABLET ORAL DAILY
Qty: 30 TABLET | Refills: 5 | OUTPATIENT
Start: 2021-04-28

## 2021-04-28 NOTE — TELEPHONE ENCOUNTER
There is a note from Psychiatry that patient was discontinued off escitalopram and started on sertraline  Please notify his mother that medication will not be refilled    Thanks

## 2021-04-28 NOTE — TELEPHONE ENCOUNTER
----- Message from Mona Leslie sent at 4/28/2021  8:26 AM EDT -----  Regarding: Cancellation  Patient contacted office and spoke with writer to cancel appointment with Saeed Polk WITHOUT sufficient notice for therapy on 4/28/2021

## 2021-05-03 ENCOUNTER — OFFICE VISIT (OUTPATIENT)
Dept: NEPHROLOGY | Facility: CLINIC | Age: 36
End: 2021-05-03
Payer: MEDICARE

## 2021-05-03 VITALS
HEIGHT: 71 IN | WEIGHT: 231.4 LBS | BODY MASS INDEX: 32.4 KG/M2 | DIASTOLIC BLOOD PRESSURE: 84 MMHG | SYSTOLIC BLOOD PRESSURE: 182 MMHG

## 2021-05-03 DIAGNOSIS — Z01.818 PRE-TRANSPLANT EVALUATION FOR ESRD (END STAGE RENAL DISEASE): Primary | ICD-10-CM

## 2021-05-03 PROCEDURE — 99215 OFFICE O/P EST HI 40 MIN: CPT | Performed by: INTERNAL MEDICINE

## 2021-05-03 NOTE — LETTER
May 3, 2021     Andry Lynne MD  [facility] [facility] [address] [id number]    Patient: Tiffani Winter   YOB: 1985   Date of Visit: 5/3/2021       Dear Dr Iris Duenas: Thank you for referring [place] to me for evaluation  Below are my notes for this consultation  If you have questions, please do not hesitate to call me  I look forward to following your patient along with you  Sincerely,        Trish Oneil MD        CC: No Recipients  Trish Oneil MD  5/3/2021  8:49 AM  Sign when Signing Visit  Susana Enriquez is a 38 y o  male  male referred by Dr Iris Duenas with a past medical history of ESRD on peritoneal dialysis prior and now on hemodialysis at [facility] due to having peritonitis with peritoneal dialysis, hypertension for approximately 4-6 years, diabetes diagnosed at 1years old type 1, CVA 1st in 2015 and again in 2018, hypertension, smoker (for 8 years), rare ETOH, uses medical marijuana, hyperlipidemia seen in the Nephrology Clinic for pre-transplant kidney evaluation  ESRD presumed to be due to DM/HTN  On HD since 2018  Native disease secondary to diabetes/hypertension not biopsy proven    Prior MRI brain -new area of T2 hyperintensity in the right middle and right inferior cerebellar peduncle with resolution of a focus of hyperintensity in the right subinsular region  Suggesting dissemination in time and space  There is concern for demyelinating disease      Renal Doppler study with no evidence of occlusive disease bilaterally  There is possible intrinsic parenchymal disease on the left kidney  Left renal artery was not able to be evaluated due to bowel gas  CT scan of the abdomen and pelvis in November 2020 with moderate ascites, reason Deon and omental in edema stable from prior, otherwise no acute abnormality  CT scan in November 2020 with small pulmonary nodule the right middle lobe      Echocardiogram with SANJEEV in  with EF 60%  ECHO in 2020 - EF 11%; mild diastolic dysfunction;     Stress test is nl in 2019    Plan   1  Patient does have risk factors including prior CVA, long-standing diabetes, heterozygote for prothrombin gene mutation, longstanding diabetes, uncontrolled hypertension  Pt is interested in pancreas transplant evaluation also  To note, pt has mutation in clotting factor  2  The need to avoid blood transfusion to decrease allosensitization was explained to the patient  3  The advantages of a living donor over a  donor was explained to the patient and family  I strongly encouraged the patient and family to pursue a living donor  4  Patient has a history of lacunar infarct and recurrent stroke  Also has 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 and underwent plasma exchange prior  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 heterozygote for prothrombin gene mutation  Patient followed with hematology and neurology prior  On plavix and aspirin  Will need records from Hematology at [facility]  5  DM Type I - takes 35 units insulin daily  6  Prior Shingles episode 7-10 years ago  7  HTN - just took medications this AM  8  Will need Cardiology clearance  Patient follows with Dr Eli Tang  Has loop recorder since 2018 CVA  9  Needs a dedicated CT scan to evaluate pulmonary nodule  10  Patient is being evaluated at [facility]  Pt is listed inactive? 11  Transplant Psychiatry - patient states that had mentioned thoughts of self harm last year and was seeing therapist  2020  Did not require admission to hospital    12  Uses medical marijuana   13  Due for colonoscopy  due to f/u of large polyp - pathology tubulovillous adenomatous polyp  14  Has gastroparesis     I have discussed with Nikos Needs and family at length about the risk and benefits of kidney transplantation   I have strongly encouraged him to pursue living donation, and explained to him the benefits of living donation over  donor transplantation  We have briefly discussed the surgical procedure  We have discussed about the need for life long immunosuppression and the importance of compliance  We have discussed the side effects of immunosuppression including but not limited to infection, malignancies and developing/worsening diabetes control  Massimo Jimenez verbalized understanding and is interested in pursuing transplant  During this visit, I spent 60 minutes with the patient; more than 50% of the time I counseled the patient regarding the risks and benefits of kidney transplantation, the immediate and long-term complications of kidney transplantation, and the advantage of receiving a living donor kidney transplant  It was a pleasure evaluating your patient in the office today  Thank you for allowing our team to participate in the care of Mr Massimo Jimenez  Please do not hesitate to contact our team if further issues/questions shall arise in the interim  HISTORY OF PRESENT ILLNESS    Massimo Jimenez is a 38 y o  male seen in the Nephrology Clinic for evaluation for kidney transplant  ESRD due to DM/HTN  Renal disease is not biopsy proven  Primary Nephrologist is Dr Zeenat Lim      HD unit - [facility]; MWF    Native Urine Output: yes, minimal  Hx of voiding difficulty: no  Hx of hematuria: no  Hx of proteinuria: yes  Hx of nephrolithiasis: no  Hx of recurrent urinary tract infection: no    HTN: onset 6-7 years ago,  hx of malignant HTN: yes, admission for HTN emergencies: yes    DM type 3 onset 1years old    Total insulin requirement/day 35 units daily  DM retinopathy: yes, Laser Surgery: no  DM neuropathy: yes  DM gastroparesis: no  DKA: yes as a child  Hypoglycemic awareness: no  Hx of amputation: no     PAD/PVD: on occasion, Claudication: no  Cardiac history - CAD: no, MI: no     Primary Cardiologist: Dr Claudio Herman    Exercise tolerance: minimal    Hx of CVA: yes    Hx of DVT/PE: no    Viral Infection:    HIV: no  Hep B: no  Hep C: no    Cancer: History of cancer: no    Health maintenance and other pertinent history:    Male:    Colonoscopy: October 2020 - 15 mm pedunculated polyp  Prostate: no issues    Blood transfusion: yes    Last admission recent admission in April for olecranon bursitis  Admission prior to this in December of 2020 with hypertensive urgency  Started on Cardene infusion  Blood pressure is improved  Regimen was adjusted and was started on doxazosin also  Review Of Systems:     Constitutional: nl appetite  Eyes: + retinopathy  ENT: no ear disease, epistaxis or oral ulcers  Respiratory: no SOB, cough, hemoptysis, PHIPPS  Cardiovascular: no CP, palpitations, claudication, edema  GI: + n/v   : see HPI  Endocrine: no thyroid disease  Heme: no bleeding or clotting disorders or swollen lymph nodes  MS: no joint effusions or deformities    Skin: no skin disease  Neuro: no HA, seizures, numbness, tingling, focal weakness  Psych: + depression    Lives with mom, father; no pets    Employment history: no    HD Access: LUE AVF  Abdominal Surgeries: prior peritonitis requiring remove PD catheter     Smoke: former  ETOH: no  Drugs: marijuana     Past Medical History:   Diagnosis Date    [facility] [address] [facility]     Ambulates with cane     Anuria     Anxiety     Chronic kidney disease     Depression     Diabetes mellitus ([facility]     Diarrhea     Falls     Gastroparesis     GERD (gastroesophageal reflux disease)     History of shingles 2010    History of transfusion 02/2018    no adverse reaction    Hyperlipidemia     Hyperphosphatemia     Hypertension     Itching     Muscle weakness     general unsteadiness    Retinopathy     Seizures ([facility] [address] )     early 2020 - one time    Skin abnormality     some dime size areas where skin was scratched from itching    Stroke ([address] )     x2 - off balance/no driving/fatigue    Swelling of both lower extremities     Vomiting     Wears glasses      Past Surgical History:   Procedure Laterality Date    CARDIAC LOOP RECORDER  05/2018    COLONOSCOPY      EGD      EYE SURGERY Right     IR AV FISTULAGRAM/GRAFTOGRAM  2/23/2021    IR TUNNELED CENTRAL LINE PLACEMENT  2/16/2021    IR TUNNELED DIALYSIS CATHETER PLACEMENT  11/18/2020    IR TUNNELED DIALYSIS CATHETER REMOVAL  2/12/2021    IR TUNNELED DIALYSIS CATHETER REMOVAL  3/11/2021    PERITONEAL CATHETER INSERTION N/A 8/27/2018    Procedure: UNROOF PD CATHETER;  Surgeon: Francy Paredes DO;  [facility]: [facility];  Service: General    ME ANASTOMOSIS,AV,ANY SITE Left 11/9/2020    Procedure: CREATION FISTULA  ARTERIOVENOUS (AV) - LEFT WRIST;  Surgeon: Claude Staggers, MD;  [facility];  Service: Vascular    ME ESOPHAGOGASTRODUODENOSCOPY TRANSORAL DIAGNOSTIC N/A 4/18/2019    Procedure: ESOPHAGOGASTRODUODENOSCOPY (EGD); Surgeon: Dereck Valles MD;  Location: [facility];   Service: Gastroenterology    ME LAP INSERTION TUNNELED INTRAPERITONEAL CATHETER N/A 8/6/2018    Procedure: LAPAROSCOPIC PD CATHETER PLACEMENT;  Surgeon: Francy Paredes DO;  [facility];  Service: General    ME REMOVAL TUNNELED INTRAPERITONEAL CATHETER N/A 11/18/2020    Procedure: REMOVAL CATHETER PERITONEAL DIALYSIS;  Surgeon: Tylor Venegas MD;  [facility];  Service: General    TONSILLECTOMY      UPPER GASTROINTESTINAL ENDOSCOPY         Family History   Problem Relation Age of Onset    Breast cancer Mother     Hypertension Mother     Hyperlipidemia Father     Hypertension Father     Leukemia Maternal Grandmother     Hyperlipidemia Maternal Grandfather     Hypertension Maternal Grandfather     Hyperlipidemia Paternal Grandmother     Hypertension Paternal Grandmother     Heart disease Paternal Grandfather         cardiac disorder    Diabetes Paternal Grandfather      Social History     Socioeconomic History    Marital status: Single     Spouse name: None  Number of children: None    Years of education: None    Highest education level: None   Occupational History    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   Social Needs    Financial resource strain: None    Food insecurity     Worry: None     Inability: None    Transportation needs     Medical: No     Non-medical: No   Tobacco Use    Smoking status: Former Smoker     Packs/day: 0 50     Years: 12 00     Pack years: 6 00     Types: Cigarettes     Quit date: 02/2018     Years since quitting: 3 2    Smokeless tobacco: Never Used    Tobacco comment: quit 2/2018   Substance and Sexual Activity    Alcohol use: Not Currently    Drug use: Yes     Types: Marijuana     Comment: medical marijuana    Sexual activity: None   Lifestyle    Physical activity     Days per week: None     Minutes per session: None    Stress: None   Relationships    Social connections     Talks on phone: None     Gets together: None     Attends Sabianism service: None     Active member of club or organization: None     Attends meetings of clubs or organizations: None     Relationship status: None    Intimate partner violence     Fear of current or ex partner: None     Emotionally abused: None     Physically abused: None     Forced sexual activity: None   Other Topics Concern    None   Social History Narrative    Caffeine use    single         Living donors: has not discussed with family    Current Outpatient Medications   Medication Sig Dispense Refill    atorvastatin (LIPITOR) 40 mg tablet Take 1 tablet (40 mg total) by mouth every evening 90 tablet 1    b complex vitamins capsule Take 1 capsule by mouth daily before lunch       B-D ULTRAFINE III SHORT PEN 31G X 8 MM MISC USE 1 PEN NEEDLE 8 TIMES DAILY 100 each 47    calcium acetate (PHOSLO) capsule TAKE 3 CAPSULES BY MOUTH WITH MEALS      cholecalciferol (VITAMIN D3) 1,000 units tablet Take 1,000 Units by mouth daily      cinacalcet (SENSIPAR) 60 MG tablet Take 60 mg by mouth daily Non-dialysis days      doxazosin (CARDURA) 2 mg tablet Take 2 mg by mouth daily after lunch      doxazosin (CARDURA) 8 MG tablet Take 8 mg by mouth daily at bedtime      famotidine (PEPCID) 40 MG tablet Take 1 tablet (40 mg total) by mouth 2 (two) times a day (Patient taking differently: Take 40 mg by mouth daily ) 60 tablet 5    gabapentin (NEURONTIN) 100 mg capsule Take 2 tablets at bedtime (Patient taking differently: 200 mg Take 2 tablets at bedtime) 60 capsule 5    GLUCAGON EMERGENCY 1 MG injection INJECT 1MG SUBCUTANEOUSLY AS NEEDED (AS DIRECTED)   MAY REPEAT DOSE EVERY 20 MINUTES AS NEEDED  3    hydrALAZINE (APRESOLINE) 100 MG tablet Take 0 5 tablets (50 mg total) by mouth 2 (two) times a day 90 tablet 1    insulin glargine (Basaglar KwikPen) 100 units/mL injection pen Inject 14 Units under the skin daily at bedtime (Patient taking differently: Inject 12 Units under the skin daily at bedtime )      insulin lispro (HumaLOG) 100 units/mL injection Inject 4 Units under the skin 3 (three) times a day with meals 10 mL 0    labetalol (NORMODYNE) 200 mg tablet Take 2 tablets (400 mg total) by mouth 2 (two) times a day 30 tablet 0    lisinopril (ZESTRIL) 20 mg tablet Take 1 tablet (20 mg total) by mouth daily 30 tablet 0    Lokelma 5 g PACK see administration instructions Once daily on non dialysis days      loperamide (IMODIUM) 2 mg capsule Take 1 capsule (2 mg total) by mouth 3 (three) times a day as needed for diarrhea 30 capsule 0    metolazone (ZAROXOLYN) 10 mg tablet Take 10 mg by mouth daily      minoxidil (LONITEN) 2 5 mg tablet TAKE 1 2 (ONE HALF) TABLET BY MOUTH TWICE DAILY      NIFEdipine ER (ADALAT CC) 60 MG 24 hr tablet Take 1 tablet (60 mg total) by mouth 2 (two) times a day 180 tablet 0    ondansetron (ZOFRAN) 4 mg tablet Take 4 mg by mouth every 8 (eight) hours as needed for nausea or vomiting      saccharomyces boulardii (FLORASTOR) 250 mg capsule Take 1 capsule (250 mg total) by mouth 2 (two) times a day 60 capsule 0    sertraline (ZOLOFT) 50 mg tablet Take 1 tablet (50 mg total) by mouth daily at bedtime 30 tablet 1    torsemide (DEMADEX) 100 mg tablet Take 100 mg by mouth 2 (two) times a day       Admelog SoloStar 100 units/mL injection pen Inject 6 Units under the skin 3 (three) times a day with meals       cloNIDine (CATAPRES-TTS-2) 0 2 mg/24 hr       HYDROXYZINE HCL PO Take by mouth      Metoclopramide HCl (REGLAN PO) Take by mouth Unknown dose      polyethylene glycol (GOLYTELY) 4000 mL solution Take 4,000 mL by mouth once for 1 dose (Patient not taking: Reported on 5/3/2021) 4000 mL 0    sevelamer (RENAGEL) 800 mg tablet Take 3 tablets (2,400 mg total) by mouth 3 (three) times a day with meals (Patient not taking: Reported on 5/3/2021) 270 tablet 0    sevelamer carbonate (Renvela) 800 mg tablet 3 (three) times a day with meals        No current facility-administered medications for this visit        Sulfa antibiotics    Physical Exam:    BP (!) 182/84 (BP Location: Right arm, Patient Position: Sitting, Cuff Size: Large)   Ht 5' 11" (1 803 m)   Wt 105 kg (231 lb 6 4 oz)   BMI 32 27 kg/m²     General : NAD    HEENT: anicteric  Cardiac:  RRR, S1, S2 normal,  no murmur    Lung:  CTAB   Abdomen:  soft, nontender, no ascites   HD access: LUE AVF   femoral pulses (no bruit)   trace edema   Neuro:poor balance   Skin: tattoo no  Carotid bruit: no  Lymph: no LN- cervical, axillary, inguinal

## 2021-05-03 NOTE — PATIENT INSTRUCTIONS
1) We will see you in one year locally  2) please call if you do not hear from [facility] within 4 weeks

## 2021-05-03 NOTE — PROGRESS NOTES
Ren Gray is a 38 y o  male  male referred by Dr Akbar Onofre with a past medical history of ESRD on peritoneal dialysis prior and now on hemodialysis at [facility] due to having peritonitis with peritoneal dialysis, hypertension for approximately 4-6 years, diabetes diagnosed at 1years old type 1, CVA 1st in 2015 and again in 2018, hypertension, smoker (for 8 years), rare ETOH, uses medical marijuana, hyperlipidemia seen in the Nephrology Clinic for pre-transplant kidney evaluation  ESRD presumed to be due to DM/HTN  On HD since 2018  Native disease secondary to diabetes/hypertension not biopsy proven    Prior MRI brain -new area of T2 hyperintensity in the right middle and right inferior cerebellar peduncle with resolution of a focus of hyperintensity in the right subinsular region  Suggesting dissemination in time and space  There is concern for demyelinating disease      Renal Doppler study with no evidence of occlusive disease bilaterally  There is possible intrinsic parenchymal disease on the left kidney  Left renal artery was not able to be evaluated due to bowel gas  CT scan of the abdomen and pelvis in November 2020 with moderate ascites, reason Deon and omental in edema stable from prior, otherwise no acute abnormality  CT scan in November 2020 with small pulmonary nodule the right middle lobe  Echocardiogram with SANJEEV in 2018 with EF 60%  ECHO in July 2020 - EF 95%; mild diastolic dysfunction;     Stress test is nl in 2/2019    Plan   1  Patient does have risk factors including prior CVA, long-standing diabetes, heterozygote for prothrombin gene mutation, longstanding diabetes, uncontrolled hypertension  Pt is interested in pancreas transplant evaluation also  To note, pt has mutation in clotting factor  2  The need to avoid blood transfusion to decrease allosensitization was explained to the patient    3  The advantages of a living donor over a  donor was explained to the patient and family  I strongly encouraged the patient and family to pursue a living donor  4  Patient has a history of lacunar infarct and recurrent stroke  Also has 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 and underwent plasma exchange prior  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 11  Transplant Psychiatry - patient states that had mentioned thoughts of self harm last year and was seeing therapist  2020  Did not require admission to hospital    12  Uses medical marijuana   13  Due for colonoscopy  due to f/u of large polyp - pathology tubulovillous adenomatous polyp  14  Has gastroparesis     I have discussed with Nikos Needs and family at length about the risk and benefits of kidney transplantation  I have strongly encouraged him to pursue living donation, and explained to him the benefits of living donation over  donor transplantation  We have briefly discussed the surgical procedure  We have discussed about the need for life long immunosuppression and the importance of compliance  We have discussed the side effects of immunosuppression including but not limited to infection, malignancies and developing/worsening diabetes control  Nikos Needs verbalized understanding and is interested in pursuing transplant      During this visit, I spent 60 minutes with the patient; more than 50% of the time I counseled the patient regarding the risks and benefits of kidney transplantation, the immediate and long-term complications of kidney transplantation, and the advantage of receiving a living donor kidney transplant  It was a pleasure evaluating your patient in the office today  Thank you for allowing our team to participate in the care of Mr Benigno Vega  Please do not hesitate to contact our team if further issues/questions shall arise in the interim  HISTORY OF PRESENT ILLNESS    Benigno Vega is a 38 y o  male seen in the Nephrology Clinic for evaluation for kidney transplant  ESRD due to DM/HTN  Renal disease is not biopsy proven  Primary Nephrologist is Dr Darryle Ishihara  [facility]    Native Urine Output: yes, minimal  Hx of voiding difficulty: no  Hx of hematuria: no  Hx of proteinuria: yes  Hx of nephrolithiasis: no  Hx of recurrent urinary tract infection: no    HTN: onset 6-7 years ago,  hx of malignant HTN: yes, admission for HTN emergencies: yes    DM type 3 onset 1years old    Total insulin requirement/day 35 units daily  DM retinopathy: yes, Laser Surgery: no  DM neuropathy: yes  DM gastroparesis: no  DKA: yes as a child  Hypoglycemic awareness: no  Hx of amputation: no     PAD/PVD: on occasion, Claudication: no  Cardiac history - CAD: no, MI: no     Primary Cardiologist: Dr Maggy Morales    Exercise tolerance: minimal    Hx of CVA: yes    Hx of DVT/PE: no    Viral Infection:    HIV: no  Hep B: no  Hep C: no    Cancer: History of cancer: no    Health maintenance and other pertinent history:    Male:    Colonoscopy: October 2020 - 15 mm pedunculated polyp  Prostate: no issues    Blood transfusion: yes    Last admission recent admission in April for olecranon bursitis  Admission prior to this in December of 2020 with hypertensive urgency  Started on Cardene infusion  Blood pressure is improved  Regimen was adjusted and was started on doxazosin also      Review Of Systems:     Constitutional: nl appetite  Eyes: + retinopathy  ENT: no ear disease, epistaxis or oral ulcers  Respiratory: no SOB, cough, hemoptysis, PHIPPS  Cardiovascular: no CP, palpitations, claudication, edema  GI: + n/v   : see HPI  Endocrine: no thyroid disease  Heme: no bleeding or clotting disorders or swollen lymph nodes  MS: no joint effusions or deformities    Skin: no skin disease  Neuro: no HA, seizures, numbness, tingling, focal weakness  Psych: + depression    Lives with mom, father; no pets    Employment history: no    HD Access: LUE AVF  Abdominal Surgeries: prior peritonitis requiring remove PD catheter     Smoke: former  ETOH: no  Drugs: marijuana     Past Medical History:   Diagnosis Date    [facility] [address] [facility]     Ambulates with cane     Anuria     Anxiety     Chronic kidney disease     Depression     Diabetes mellitus ([facility] [address] [facility]     Diarrhea     Falls     Gastroparesis     GERD (gastroesophageal reflux disease)     History of shingles 2010    History of transfusion 02/2018    no adverse reaction    Hyperlipidemia     Hyperphosphatemia     Hypertension     Itching     Muscle weakness     general unsteadiness    Retinopathy     Seizures ([facility] [address] )     early 2020 - one time    Skin abnormality     some dime size areas where skin was scratched from itching    Stroke ([facility] [address] )     x2 - off balance/no driving/fatigue    Swelling of both lower extremities     Vomiting     Wears glasses      Past Surgical History:   Procedure Laterality Date    CARDIAC LOOP RECORDER  05/2018    COLONOSCOPY      EGD      EYE SURGERY Right     IR AV FISTULAGRAM/GRAFTOGRAM  2/23/2021    IR TUNNELED CENTRAL LINE PLACEMENT  2/16/2021    IR TUNNELED DIALYSIS CATHETER PLACEMENT  11/18/2020    IR TUNNELED DIALYSIS CATHETER REMOVAL  2/12/2021    IR TUNNELED DIALYSIS CATHETER REMOVAL  3/11/2021    PERITONEAL CATHETER INSERTION N/A 8/27/2018    Procedure: UNROOF PD CATHETER;  Surgeon: Kimberly Hu DO Guicho;  [facility];  Service: General    [place] ANASTOMOSIS,AV,ANY SITE Left 11/9/2020    Procedure: CREATION FISTULA  ARTERIOVENOUS (AV) - LEFT WRIST;  Surgeon: Guerda Camargo MD;  [facility];  Service: Vascular    [place] ESOPHAGOGASTRODUODENOSCOPY TRANSORAL DIAGNOSTIC N/A 4/18/2019    Procedure: ESOPHAGOGASTRODUODENOSCOPY (EGD); Surgeon: Esau Ayala MD;  [facility]: [facility];   Service: Gastroenterology    [place] LAP INSERTION TUNNELED INTRAPERITONEAL CATHETER N/A 8/6/2018    Procedure: LAPAROSCOPIC PD CATHETER PLACEMENT;  Surgeon: Nery Quick DO;  [facility];  Service: General    [place] REMOVAL TUNNELED INTRAPERITONEAL CATHETER N/A 11/18/2020    Procedure: REMOVAL CATHETER PERITONEAL DIALYSIS;  Surgeon: Yue Barraza MD;  [facility];  Service: General    TONSILLECTOMY      UPPER GASTROINTESTINAL ENDOSCOPY         Family History   Problem Relation Age of Onset    Breast cancer Mother     Hypertension Mother     Hyperlipidemia Father     Hypertension Father     Leukemia Maternal Grandmother     Hyperlipidemia Maternal Grandfather     Hypertension Maternal Grandfather     Hyperlipidemia Paternal Grandmother     Hypertension Paternal Grandmother     Heart disease Paternal Grandfather         cardiac disorder    Diabetes Paternal Grandfather      Social History     Socioeconomic History    Marital status: Single     Spouse name: None    Number of children: None    Years of education: None    Highest education level: None   Occupational History    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   Social Needs    Financial resource strain: None    Food insecurity     Worry: None     Inability: None    Transportation needs     Medical: No     Non-medical: No   Tobacco Use    Smoking status: Former Smoker     Packs/day: 0 50     Years: 12 00     Pack years: 6 00     Types: Cigarettes     Quit date: 02/2018     Years since quitting: 3 2    Smokeless tobacco: Never Used    Tobacco comment: quit 2/2018   Substance and Sexual Activity    Alcohol use: Not Currently    Drug use: Yes     Types: Marijuana     Comment: medical marijuana    Sexual activity: None   Lifestyle    Physical activity     Days per week: None     Minutes per session: None    Stress: None   Relationships    Social connections     Talks on phone: None     Gets together: None     Attends Confucianist service: None     Active member of club or organization: None     Attends meetings of clubs or organizations: None     Relationship status: None    Intimate partner violence     Fear of current or ex partner: None     Emotionally abused: None     Physically abused: None     Forced sexual activity: None   Other Topics Concern    None   Social History Narrative    Caffeine use    single         Living donors: has not discussed with family    Current Outpatient Medications   Medication Sig Dispense Refill    atorvastatin (LIPITOR) 40 mg tablet Take 1 tablet (40 mg total) by mouth every evening 90 tablet 1    b complex vitamins capsule Take 1 capsule by mouth daily before lunch       B-D ULTRAFINE III SHORT PEN 31G X 8 MM MISC USE 1 PEN NEEDLE 8 TIMES DAILY 100 each 47    calcium acetate (PHOSLO) capsule TAKE 3 CAPSULES BY MOUTH WITH MEALS      cholecalciferol (VITAMIN D3) 1,000 units tablet Take 1,000 Units by mouth daily      cinacalcet (SENSIPAR) 60 MG tablet Take 60 mg by mouth daily Non-dialysis days      doxazosin (CARDURA) 2 mg tablet Take 2 mg by mouth daily after lunch      doxazosin (CARDURA) 8 MG tablet Take 8 mg by mouth daily at bedtime      famotidine (PEPCID) 40 MG tablet Take 1 tablet (40 mg total) by mouth 2 (two) times a day (Patient taking differently: Take 40 mg by mouth daily ) 60 tablet 5    gabapentin (NEURONTIN) 100 mg capsule Take 2 tablets at bedtime (Patient taking differently: 200 mg Take 2 tablets at bedtime) 60 capsule 5    GLUCAGON EMERGENCY 1 MG injection INJECT 1MG SUBCUTANEOUSLY AS NEEDED (AS DIRECTED)   MAY REPEAT DOSE EVERY 20 MINUTES AS NEEDED  3    hydrALAZINE (APRESOLINE) 100 MG tablet Take 0 5 tablets (50 mg total) by mouth 2 (two) times a day 90 tablet 1    insulin glargine (Basaglar KwikPen) 100 units/mL injection pen Inject 14 Units under the skin daily at bedtime (Patient taking differently: Inject 12 Units under the skin daily at bedtime )      insulin lispro (HumaLOG) 100 units/mL injection Inject 4 Units under the skin 3 (three) times a day with meals 10 mL 0    labetalol (NORMODYNE) 200 mg tablet Take 2 tablets (400 mg total) by mouth 2 (two) times a day 30 tablet 0    lisinopril (ZESTRIL) 20 mg tablet Take 1 tablet (20 mg total) by mouth daily 30 tablet 0    Lokelma 5 g PACK see administration instructions Once daily on non dialysis days      loperamide (IMODIUM) 2 mg capsule Take 1 capsule (2 mg total) by mouth 3 (three) times a day as needed for diarrhea 30 capsule 0    metolazone (ZAROXOLYN) 10 mg tablet Take 10 mg by mouth daily      minoxidil (LONITEN) 2 5 mg tablet TAKE 1 2 (ONE HALF) TABLET BY MOUTH TWICE DAILY      NIFEdipine ER (ADALAT CC) 60 MG 24 hr tablet Take 1 tablet (60 mg total) by mouth 2 (two) times a day 180 tablet 0    ondansetron (ZOFRAN) 4 mg tablet Take 4 mg by mouth every 8 (eight) hours as needed for nausea or vomiting      saccharomyces boulardii (FLORASTOR) 250 mg capsule Take 1 capsule (250 mg total) by mouth 2 (two) times a day 60 capsule 0    sertraline (ZOLOFT) 50 mg tablet Take 1 tablet (50 mg total) by mouth daily at bedtime 30 tablet 1    torsemide (DEMADEX) 100 mg tablet Take 100 mg by mouth 2 (two) times a day       Admelog SoloStar 100 units/mL injection pen Inject 6 Units under the skin 3 (three) times a day with meals       cloNIDine (CATAPRES-TTS-2) 0 2 mg/24 hr       HYDROXYZINE HCL PO Take by mouth      Metoclopramide HCl (REGLAN PO) Take by mouth Unknown dose      polyethylene glycol (GOLYTELY) 4000 mL solution Take 4,000 mL by mouth once for 1 dose (Patient not taking: Reported on 5/3/2021) 4000 mL 0    sevelamer (RENAGEL) 800 mg tablet Take 3 tablets (2,400 mg total) by mouth 3 (three) times a day with meals (Patient not taking: Reported on 5/3/2021) 270 tablet 0    sevelamer carbonate (Renvela) 800 mg tablet 3 (three) times a day with meals        No current facility-administered medications for this visit        Sulfa antibiotics    Physical Exam:    BP (!) 182/84 (BP Location: Right arm, Patient Position: Sitting, Cuff Size: Large)   Ht 5' 11" (1 803 m)   Wt 105 kg (231 lb 6 4 oz)   BMI 32 27 kg/m²     General : NAD    HEENT: anicteric  Cardiac:  RRR, S1, S2 normal,  no murmur    Lung:  CTAB   Abdomen:  soft, nontender, no ascites   HD access: LUE AVF   femoral pulses (no bruit)   trace edema   Neuro:poor balance   Skin: tattoo no  Carotid bruit: no  Lymph: no LN- cervical, axillary, inguinal

## 2021-05-06 ENCOUNTER — TELEPHONE (OUTPATIENT)
Dept: PSYCHIATRY | Facility: CLINIC | Age: 36
End: 2021-05-06

## 2021-05-12 ENCOUNTER — SOCIAL WORK (OUTPATIENT)
Dept: BEHAVIORAL/MENTAL HEALTH CLINIC | Facility: CLINIC | Age: 36
End: 2021-05-12
Payer: MEDICARE

## 2021-05-12 DIAGNOSIS — F32.A DEPRESSIVE DISORDER: Primary | ICD-10-CM

## 2021-05-12 PROCEDURE — 90834 PSYTX W PT 45 MINUTES: CPT | Performed by: SOCIAL WORKER

## 2021-05-12 NOTE — PSYCH
Psychotherapy Provided: Individual Psychotherapy 45 minutes     Length of time in session: 45 minutes, follow up in 2 week    No diagnosis found  Goals addressed in session: Goal 1, Goal 2 and Goal 3      Pain:      moderate to severe    4    Current suicide risk : Low     Data: Bill arrived for his session  Corby Flowers is trying to get on the \[facility] [place] [facility]\"" list for a kidney  He is now on Medicare and \[facility] [place] [facility]\"" will take him on their list  Corby Flowers shared he feels his depression and anxiety are manageable  However his mind races sometimes at night which precludes him from being able to sleep  He also discussed his family of origin issues  Assessment: Corby Flowers is not suicidal or homicidal and he feels his depression and anxiety are manageable  However his mind races at night  We worked on mindfulness and CBT  Plan: Continue to help him skill build in distress tolerance  Behavioral Health Treatment Plan [facility]: Diagnosis and Treatment Plan explained to Zari Gil relates understanding diagnosis and is agreeable to Treatment Plan   Yes

## 2021-05-12 NOTE — BH TREATMENT PLAN
Kallie Pardeep  1985       Date of Initial Treatment Plan: 11/30/2021  Date of Current Treatment Plan: 05/12/21    Treatment Plan Number 1st update     Strengths/Personal Resources for Self Care: sense of humor,he manages his health as best as he can, fond of his nephews and his nieces    Diagnosis:   1  Depressive disorder, unspecified          Area of Needs: Please see below      Long Term Goal 1: I need to continue to effectively manage my depression and my anxiety    Target Date: 11/12/2021  Completion Date: TBD         Short Term Objectives for Goal 1: mindfulness, meditation, guided imagry, relaxation strategies    Long Term Goal 2: I want to continue to work on family of origin issues especially in relationship to my parents    Target Date: 11/12/2021  Completion Date: TBD    Short Term Objectives for Goal 2: systems work and discussion of situations in therapy sessions         Long Term Goal # 3: I need to work on coping skills in relation to my serious health issues  Target Date: 11/12/2021  Completion Date: TBD    Short Term Objectives for Goal 3: mindfulness, CBT and solution focused strategies    GOAL 1: Modality: Individual 2x per month   Completion Date tbd    GOAL 2: Modality: Individual 2x per month   Completion Date tbd     GOAL 3: Modality: Individual 2x per month   Completion Date tbd      Behavioral Health Treatment Plan [facility]: Diagnosis and Treatment Plan explained to Beto Houston relates understanding diagnosis and is agreeable to Treatment Plan         Client Comments : Please share your thoughts, feelings, need and/or experiences regarding your treatment plan: Gaston Nugent and the undersigned will work as a team to help him progress on his goals

## 2021-05-23 DIAGNOSIS — I15.0 RENOVASCULAR HYPERTENSION: Primary | ICD-10-CM

## 2021-05-24 RX ORDER — DOXAZOSIN 2 MG/1
TABLET ORAL
Qty: 120 TABLET | Refills: 0 | Status: SHIPPED | OUTPATIENT
Start: 2021-05-24

## 2021-06-03 ENCOUNTER — HOSPITAL ENCOUNTER (OUTPATIENT)
Dept: GASTROENTEROLOGY | Facility: HOSPITAL | Age: 36
Setting detail: OUTPATIENT SURGERY
Discharge: HOME | End: 2021-06-03
Attending: INTERNAL MEDICINE | Admitting: INTERNAL MEDICINE
Payer: MEDICARE

## 2021-06-03 VITALS
TEMPERATURE: 97.2 F | DIASTOLIC BLOOD PRESSURE: 79 MMHG | SYSTOLIC BLOOD PRESSURE: 144 MMHG | RESPIRATION RATE: 18 BRPM | BODY MASS INDEX: 29.99 KG/M2 | WEIGHT: 215 LBS | OXYGEN SATURATION: 97 % | HEART RATE: 69 BPM

## 2021-06-03 DIAGNOSIS — D12.6 TUBULOVILLOUS ADENOMA OF COLON: ICD-10-CM

## 2021-06-03 PROCEDURE — 88305 TISSUE EXAM BY PATHOLOGIST: CPT | Performed by: PATHOLOGY

## 2021-06-03 PROCEDURE — 45385 COLONOSCOPY W/LESION REMOVAL: CPT | Performed by: INTERNAL MEDICINE

## 2021-06-03 RX ORDER — LIDOCAINE HYDROCHLORIDE 10 MG/ML
INJECTION, SOLUTION EPIDURAL; INFILTRATION; INTRACAUDAL; PERINEURAL AS NEEDED
Status: DISCONTINUED | OUTPATIENT
Start: 2021-06-03 | End: 2021-06-03

## 2021-06-03 RX ORDER — PROPOFOL 10 MG/ML
INJECTION, EMULSION INTRAVENOUS AS NEEDED
Status: DISCONTINUED | OUTPATIENT
Start: 2021-06-03 | End: 2021-06-03

## 2021-06-03 RX ORDER — SODIUM CHLORIDE, SODIUM LACTATE, POTASSIUM CHLORIDE, CALCIUM CHLORIDE 600; 310; 30; 20 MG/100ML; MG/100ML; MG/100ML; MG/100ML
INJECTION, SOLUTION INTRAVENOUS CONTINUOUS PRN
Status: DISCONTINUED | OUTPATIENT
Start: 2021-06-03 | End: 2021-06-03

## 2021-06-03 RX ORDER — PROPOFOL 10 MG/ML
INJECTION, EMULSION INTRAVENOUS CONTINUOUS PRN
Status: DISCONTINUED | OUTPATIENT
Start: 2021-06-03 | End: 2021-06-03

## 2021-06-03 RX ADMIN — SODIUM CHLORIDE, SODIUM LACTATE, POTASSIUM CHLORIDE, AND CALCIUM CHLORIDE: .6; .31; .03; .02 INJECTION, SOLUTION INTRAVENOUS at 09:25

## 2021-06-03 RX ADMIN — PROPOFOL 30 MG: 10 INJECTION, EMULSION INTRAVENOUS at 09:36

## 2021-06-03 RX ADMIN — PROPOFOL 20 MG: 10 INJECTION, EMULSION INTRAVENOUS at 09:31

## 2021-06-03 RX ADMIN — Medication 40 MG: at 09:41

## 2021-06-03 RX ADMIN — PROPOFOL 150 MCG/KG/MIN: 10 INJECTION, EMULSION INTRAVENOUS at 09:30

## 2021-06-03 RX ADMIN — LIDOCAINE HYDROCHLORIDE 50 MG: 10 INJECTION, SOLUTION EPIDURAL; INFILTRATION; INTRACAUDAL at 09:30

## 2021-06-03 RX ADMIN — PROPOFOL 150 MG: 10 INJECTION, EMULSION INTRAVENOUS at 09:30

## 2021-06-03 RX ADMIN — PROPOFOL 50 MG: 10 INJECTION, EMULSION INTRAVENOUS at 09:33

## 2021-06-03 NOTE — ANESTHESIA POSTPROCEDURE EVALUATION
Post-Op Assessment Note    CV Status:  Stable  Pain Score: 0    Pain management: adequate     Mental Status:  Awake and sleepy   Hydration Status:  Euvolemic   PONV Controlled:  Controlled   Airway Patency:  Patent and adequate      Post Op Vitals Reviewed: Yes      Staff: CRNA         No complications documented      BP   127/58   Temp   97 2   Pulse 66   Resp 20   SpO2 100

## 2021-06-03 NOTE — ANESTHESIA PREPROCEDURE EVALUATION
Procedure:  COLONOSCOPY    Relevant Problems   ANESTHESIA   (+) PONV (postoperative nausea and vomiting)      CARDIO   (+) Renovascular hypertension      ENDO   (+) Secondary hyperparathyroidism (HCC)   (+) Type 1 diabetes mellitus (HCC)      GI/HEPATIC   (+) Gastroesophageal reflux disease without esophagitis      /RENAL   (+) ESRD (end stage renal disease) (HCC)   (+) End stage renal disease on dialysis due to type 1 diabetes mellitus (HCC)      HEMATOLOGY   (+) Anemia in chronic kidney disease, on chronic dialysis (HCC)      NEURO/PSYCH   (+) Binocular visual disturbance   (+) Depressive disorder, unspecified    (+) Diabetic neuropathy (HCC)   (+) Gastroparesis diabeticorum (HCC)   (+) History of lacunar cerebrovascular accident (CVA)   (+) Provoked seizure ([facility] [address] )      CAD/PCI/MI/CHF -- h/o CVA, residual balance and visual sx; no CAD/PCI/CHF  COPD/ASTHMA/RACHEAL -- denies  PROBS WITH PRIOR ANESTHESIA -- denies  NPO STATUS CONFIRMED    Unremarkable dialysis 6/2    Lab Results   Component Value Date    WBC 5 74 04/03/2021    HGB 13 7 04/03/2021     04/03/2021     Lab Results   Component Value Date    SODIUM 139 04/03/2021    K 5 7 (H) 05/26/2021    BUN 41 (H) 04/03/2021    CREATININE 8 42 (H) 04/03/2021    EGFR 7 04/03/2021    GLUCOSE 275 (H) 02/21/2020     Lab Results   Component Value Date    PTT 33 11/13/2020      Lab Results   Component Value Date    INR 1 01 11/13/2020       Blood type O    Lab Results   Component Value Date    HGBA1C 6 9 (H) 11/13/2020                 Physical Exam    Airway    Mallampati score: I  TM Distance: >3 FB       Dental   No notable dental hx     Cardiovascular      Pulmonary      Other Findings        Anesthesia Plan  ASA Score- 3     Anesthesia Type- IV sedation with anesthesia with ASA Monitors  Additional Monitors:   Airway Plan:     Comment: ALLI Rob , have personally seen and evaluated the patient prior to anesthetic care    I have reviewed the pre-anesthetic record, and other medical records if appropriate to the anesthetic care  If a CRNA is involved in the case, I have reviewed the CRNA assessment, if present, and agree  Risks/benefits and alternatives discussed with patient including possible PONV, sore throat, and possibility of rare anesthetic and surgical emergencies          Plan Factors-Exercise tolerance (METS): >4 METS  Chart reviewed  EKG reviewed  Imaging results reviewed  Existing labs reviewed  Patient summary reviewed  Patient is not a current smoker  Patient not instructed to abstain from smoking on day of procedure  Induction-     Postoperative Plan-     Informed Consent- Anesthetic plan and risks discussed with patient  I personally reviewed this patient with the CRNA  Discussed and agreed on the Anesthesia Plan with the CRNA  Anat Lundy

## 2021-06-03 NOTE — H&P
History and Physical -  [facility]  Naldo Cabrera Linda 38 y o  male MRN: [id number]    HPI: Alondra Lewis is a 38y o  year old male who presents with history of a tubulovillous adenoma poor prep on prior colonoscopy      Review of Systems    Historical Information   Past Medical History:   Diagnosis Date    Acute kidney injury ([facility]     Ambulates with cane     Anuria     Anxiety     Chronic kidney disease     Depression     Diabetes mellitus ([facility] )     Diarrhea     Falls     Gastroparesis     GERD (gastroesophageal reflux disease)     History of shingles 2010    History of transfusion 02/2018    no adverse reaction    Hyperlipidemia     Hyperphosphatemia     Hypertension     Itching     Muscle weakness     general unsteadiness    Retinopathy     Seizures ([facility] )     early 2020 - one time    Skin abnormality     some dime size areas where skin was scratched from itching    [facility]     x2 - off balance/no driving/fatigue    Swelling of both lower extremities     Vomiting     Wears glasses      Past Surgical History:   Procedure Laterality Date    CARDIAC LOOP RECORDER  05/2018    COLONOSCOPY      EGD      EYE SURGERY Right     IR AV FISTULAGRAM/GRAFTOGRAM  2/23/2021    IR TUNNELED CENTRAL LINE PLACEMENT  2/16/2021    IR TUNNELED DIALYSIS CATHETER PLACEMENT  11/18/2020    IR TUNNELED DIALYSIS CATHETER REMOVAL  2/12/2021    IR TUNNELED DIALYSIS CATHETER REMOVAL  3/11/2021    PERITONEAL CATHETER INSERTION N/A 8/27/2018    Procedure: UNROOF PD CATHETER;  Surgeon: Danielle Justin DO;  [facility];  Service: General    MD ANASTOMOSIS,AV,ANY SITE Left 11/9/2020    Procedure: CREATION FISTULA  ARTERIOVENOUS (AV) - LEFT WRIST;  Surgeon: Darnell Torres MD;  [facility];  Service: Vascular    MD ESOPHAGOGASTRODUODENOSCOPY TRANSORAL DIAGNOSTIC N/A 4/18/2019    Procedure: ESOPHAGOGASTRODUODENOSCOPY (EGD);   Surgeon: Dianelys Olmstead MD;  Location: [facility]; Service: Gastroenterology    GA LAP INSERTION TUNNELED INTRAPERITONEAL CATHETER N/A 8/6/2018    Procedure: LAPAROSCOPIC PD CATHETER PLACEMENT;  Surgeon: Billy Capps DO;  [facility];  Service: General    GA REMOVAL TUNNELED INTRAPERITONEAL CATHETER N/A 11/18/2020    Procedure: REMOVAL CATHETER PERITONEAL DIALYSIS;  Surgeon: Gifty Gibbs MD;  [facility];  Service: General    TONSILLECTOMY      UPPER GASTROINTESTINAL ENDOSCOPY       Social History   Social History     Substance and Sexual Activity   Alcohol Use Not Currently     Social History     Substance and Sexual Activity   Drug Use Yes    Types: Marijuana    Comment: medical marijuana     Social History     Tobacco Use   Smoking Status Former Smoker    Packs/day: 0 50    Years: 12 00    Pack years: 6 00    Types: Cigarettes    Quit date: 02/2018    Years since quitting: 3 3   Smokeless Tobacco Never Used   Tobacco Comment    quit 2/2018     Family History   Problem Relation Age of Onset    Breast cancer Mother     Hypertension Mother     Hyperlipidemia Father     Hypertension Father     Leukemia Maternal Grandmother     Hyperlipidemia Maternal Grandfather     Hypertension Maternal Grandfather     Hyperlipidemia Paternal Grandmother     Hypertension Paternal Grandmother     Heart disease Paternal Grandfather         cardiac disorder    Diabetes Paternal Grandfather        Meds/Allergies     (Not in a hospital admission)      Allergies   Allergen Reactions    Sulfa Antibiotics Rash       Objective     There were no vitals taken for this visit        PHYSICAL EXAM    Gen: NAD  CV: RRR  CHEST: Clear  ABD: soft, NT/ND  EXT: no edema  Neuro: AAO      ASSESSMENT/PLAN:  This is a 38y o  year old male here for history of a tubulovillous adenoma poor prep on prior colonoscopy      PLAN:   Procedure: colonoscopy

## 2021-06-08 LAB — HBA1C MFR BLD HPLC: 6.3 %

## 2021-06-10 DIAGNOSIS — F32.A DEPRESSIVE DISORDER: ICD-10-CM

## 2021-06-14 ENCOUNTER — TELEPHONE (OUTPATIENT)
Dept: PSYCHIATRY | Facility: CLINIC | Age: 36
End: 2021-06-14

## 2021-06-14 NOTE — TELEPHONE ENCOUNTER
Patient mother called and left a voicemail reporting he was initially scheduled in April for an appointment which was rescheduled to June due to a change in provider schedule then June appointment was also rescheduled for the same reason  Now scheduled for July 2nd  He has concerns that he hasn't followed up since being on sertraline so much so that he's considered just stopping the medication since he has not been able to meet with the provider  She is unsure if it would be safe for him to do that and is requesting a call back

## 2021-06-14 NOTE — TELEPHONE ENCOUNTER
Left message for patient no XOCHILT on file to speak with mother  If he has concerns he can contact our office directly or sign an XOCHILT allowing us to speak with mother

## 2021-06-14 NOTE — TELEPHONE ENCOUNTER
Thanks, Lamberto Foley  I will gladly speak to Charlie if he calls the clinic and wants to discuss medication options  Unfortunately, without a XOCHILT, I cannot speak to his mother

## 2021-06-15 ENCOUNTER — TELEPHONE (OUTPATIENT)
Dept: PSYCHIATRY | Facility: CLINIC | Age: 36
End: 2021-06-15

## 2021-06-15 ENCOUNTER — TELEPHONE (OUTPATIENT)
Dept: GASTROENTEROLOGY | Facility: AMBULARY SURGERY CENTER | Age: 36
End: 2021-06-15

## 2021-06-15 NOTE — TELEPHONE ENCOUNTER
6/15 @ 10:00 Patient called office and l/m for provider that he wants to discontinue his medication ZOLOFT 50 mg tabs  He stated he is going to start taking 1/2 tablet so can can wean off medication totally    Any questions he can be reached at [phone]

## 2021-06-15 NOTE — TELEPHONE ENCOUNTER
Spoke with Beatriz Dela Cruz  He states that being on the medication has resulted in greater self awareness and being able to advocate for himself rather that being reclusive  However, he is uncomfortable with being more assertive and thus, wants to taper off the medication  He was encouraged to take 25mg of Zoloft for 1-2 weeks then discontinue  He was offered appointment in 2 days from now (6/17) to discuss further options yet he declined  Will continue to monitor

## 2021-06-15 NOTE — TELEPHONE ENCOUNTER
Patients GI provider:  Dr Jeffrey Delgado     Number to return call: [phone]     Reason for call: Pt called stated he is returning a call from [facility] for results       Scheduled procedure/appointment date if applicable: Apt/procedure n/a

## 2021-06-17 ENCOUNTER — SOCIAL WORK (OUTPATIENT)
Dept: BEHAVIORAL/MENTAL HEALTH CLINIC | Facility: CLINIC | Age: 36
End: 2021-06-17
Payer: MEDICARE

## 2021-06-17 DIAGNOSIS — F32.A DEPRESSIVE DISORDER: ICD-10-CM

## 2021-06-17 PROCEDURE — 90834 PSYTX W PT 45 MINUTES: CPT | Performed by: SOCIAL WORKER

## 2021-06-17 NOTE — PSYCH
Psychotherapy Provided: Individual Psychotherapy 45 minutes   This note was not shared with the patient due to this is a psychotherapy note  Length of time in session: 45 minutes, follow up in 2 week    Encounter Diagnosis     ICD-10-CM    1  Depressive disorder  F32 9        Goals addressed in session: Goal 1, Goal 2 and Goal 3      Pain:      moderate to severe    2    Current suicide risk : Low     Data:Paulo arrived for the session  He discussed some of the issues he recently encountered with his sister and his nephew  There is now a wedge between him and his sister  He discussed he confronted his sister over the fact they dont discipline  He is wondering if the sertraline has caused this  He said he usually just grits his teeth  I explained that it was probably not the medications   However he does now not trust the medications  I told him I believe it was more his frustration and he finally told his sister what he feels  Perhaps the meds gave him more confidence to confront  He started weening himself off before he spoke with Dr Juan Pablo Menchaca  He is discussing whether he is a good candidate for a transplant  We discussed his goals of depression and anxiety and how he is dealing with family of origin issues  He is emotionally struggling concerning whether he is a good transplant client  Assessment: Kee Bryant currently denies suicidal/homicidal ideation, plan or intent  However he admits therapy is a means to an end to make sure he is cleared for a transplant  He feels emotionally he is doing well currently  Plan: Continue to help him progress on his goals  Behavioral Health Treatment Plan [facility]: Diagnosis and Treatment Plan explained to Latoya Gaxiola relates understanding diagnosis and is agreeable to Treatment Plan   Yes

## 2021-06-18 ENCOUNTER — TELEPHONE (OUTPATIENT)
Dept: CARDIOLOGY CLINIC | Facility: CLINIC | Age: 36
End: 2021-06-18

## 2021-06-18 NOTE — TELEPHONE ENCOUNTER
Received call from [facility], transplant coordinator at [facility]  Ethel Shearer will need cardiac clearance and a cardiac cath, in order to get on transplant list for a kidney / pancreas transplant  [facility] given our back fax number and will fax request with specific info and order  Aware Dr Perfecto Orozco is out of office for a week and will address when he returns

## 2021-06-21 ENCOUNTER — TELEPHONE (OUTPATIENT)
Dept: PSYCHIATRY | Facility: CLINIC | Age: 36
End: 2021-06-21

## 2021-06-21 NOTE — TELEPHONE ENCOUNTER
----- Message from Dina Irby sent at 6/21/2021 10:01 AM EDT -----  Regarding: CX 7/8  Mother called to cancel appointment for 7/8  He has an appointment in [facility] and will not be able to make it   Appointment rescheduled to 9/23

## 2021-06-24 NOTE — TELEPHONE ENCOUNTER
Cath order from [facility] given to Freddy Granados in scheduling  She will arrange for right and left heart cath, as ordered by [facility]

## 2021-06-25 ENCOUNTER — TELEPHONE (OUTPATIENT)
Dept: CARDIOLOGY CLINIC | Facility: CLINIC | Age: 36
End: 2021-06-25

## 2021-06-25 DIAGNOSIS — N18.6 ESRD (END STAGE RENAL DISEASE) (HCC): Primary | ICD-10-CM

## 2021-06-25 NOTE — TELEPHONE ENCOUNTER
Patient schedule for [facility]LHC at [facility] on 7/29/21 with Dr Gila Odonnell  Patient mother aware of general instructions, medications holds ( Lisinopril-Hold day prior, torsemide and solo star-Hold the Am of Basaglar and humalog- only apply 1/2 jackson the Pm prior) and labs require  Patient cover under medicare  instructions mail ou

## 2021-07-01 ENCOUNTER — OFFICE VISIT (OUTPATIENT)
Dept: INTERNAL MEDICINE CLINIC | Facility: CLINIC | Age: 36
End: 2021-07-01
Payer: MEDICARE

## 2021-07-01 VITALS
HEART RATE: 83 BPM | HEIGHT: 71 IN | WEIGHT: 213.2 LBS | SYSTOLIC BLOOD PRESSURE: 150 MMHG | TEMPERATURE: 98.3 F | BODY MASS INDEX: 29.85 KG/M2 | OXYGEN SATURATION: 99 % | DIASTOLIC BLOOD PRESSURE: 80 MMHG | RESPIRATION RATE: 16 BRPM

## 2021-07-01 DIAGNOSIS — R20.0 NUMBNESS OF ARM: Primary | ICD-10-CM

## 2021-07-01 DIAGNOSIS — F41.1 GENERALIZED ANXIETY DISORDER: ICD-10-CM

## 2021-07-01 DIAGNOSIS — F33.1 MODERATE EPISODE OF RECURRENT MAJOR DEPRESSIVE DISORDER (HCC): ICD-10-CM

## 2021-07-01 PROBLEM — K31.84 GASTROPARESIS: Status: RESOLVED | Noted: 2020-09-30 | Resolved: 2021-07-01

## 2021-07-01 PROBLEM — K65.9: Status: RESOLVED | Noted: 2020-07-31 | Resolved: 2021-07-01

## 2021-07-01 PROBLEM — F32.A DEPRESSIVE DISORDER: Status: RESOLVED | Noted: 2021-04-15 | Resolved: 2021-07-01

## 2021-07-01 PROBLEM — K65.2 SPONTANEOUS BACTERIAL PERITONITIS (HCC): Status: RESOLVED | Noted: 2020-10-19 | Resolved: 2021-07-01

## 2021-07-01 PROBLEM — R11.2 PONV (POSTOPERATIVE NAUSEA AND VOMITING): Status: RESOLVED | Noted: 2018-01-26 | Resolved: 2021-07-01

## 2021-07-01 PROBLEM — Z98.890 PONV (POSTOPERATIVE NAUSEA AND VOMITING): Status: RESOLVED | Noted: 2018-01-26 | Resolved: 2021-07-01

## 2021-07-01 PROBLEM — L03.113 CELLULITIS OF RIGHT ELBOW: Status: RESOLVED | Noted: 2021-03-31 | Resolved: 2021-07-01

## 2021-07-01 PROCEDURE — 99214 OFFICE O/P EST MOD 30 MIN: CPT | Performed by: INTERNAL MEDICINE

## 2021-07-01 RX ORDER — ASPIRIN 81 MG/1
81 TABLET ORAL DAILY
COMMUNITY

## 2021-07-01 NOTE — ASSESSMENT & PLAN NOTE
-R>LUE, suspect impingement due to positioning  -recommend placing pillow on R side to prevent him from rolling over to that side overnight  -consider increasing gabapentin 200mg to three tabs at bedtime

## 2021-07-01 NOTE — ASSESSMENT & PLAN NOTE
-preferred to be off sertraline, which has been weaned off    He also was on escitalopram previously  -he likely will not be returning to current psychiatrist but encouraged to follow up with therapist

## 2021-07-01 NOTE — ASSESSMENT & PLAN NOTE
-moderately severe  -prefers off sertraline which has been discontinued  -encouraged to continue with therapist   Crista Alicea will not be returning to psychiatrist

## 2021-07-02 ENCOUNTER — TELEPHONE (OUTPATIENT)
Dept: ADMINISTRATIVE | Facility: OTHER | Age: 36
End: 2021-07-02

## 2021-07-02 ENCOUNTER — REMOTE DEVICE CLINIC VISIT (OUTPATIENT)
Dept: CARDIOLOGY CLINIC | Facility: CLINIC | Age: 36
End: 2021-07-02
Payer: MEDICARE

## 2021-07-02 DIAGNOSIS — Z95.818 PRESENCE OF OTHER CARDIAC IMPLANTS AND GRAFTS: Primary | ICD-10-CM

## 2021-07-02 PROCEDURE — 93298 REM INTERROG DEV EVAL SCRMS: CPT | Performed by: INTERNAL MEDICINE

## 2021-07-02 PROCEDURE — G2066 INTER DEVC REMOTE 30D: HCPCS | Performed by: INTERNAL MEDICINE

## 2021-07-02 NOTE — TELEPHONE ENCOUNTER
Upon review of the In Basket request we were able to locate, review, and update the patient chart as requested for Diabetic Eye Exam     Any additional questions or concerns should be emailed to the Practice Liaisons via [email or web address] email, please do not reply via In Basket      Thank you  Jacobo Hopper

## 2021-07-02 NOTE — PROGRESS NOTES
Results for orders placed or performed in visit on 07/02/21   Cardiac EP device report    Narrative    MDT LOOP/ ACTIVE SYSTEM IS MRI CONDITIONAL  CARELINK TRANSMISSION: BATTERY STATUS "OK " NO DEVICE DETECTED & NO PT ACTIVATED EPISODES  PRESENTING RHYTHM NSR  NORMAL DEVICE FUNCTION   GV

## 2021-07-02 NOTE — TELEPHONE ENCOUNTER
----- Message from Payton Moe sent at 7/1/2021 11:41 AM EDT -----  Regarding: Care Gap Request  07/01/21 11:41 AM    Hello, our patient attached above has had Hemoglobin A1c completed/performed  Please assist in updating the patient chart by pulling the Care Everywhere (CE) document  The date of service is 6/8/2021       Thank you,  Concha Ornelas  [facility]

## 2021-07-06 ENCOUNTER — HOSPITAL ENCOUNTER (EMERGENCY)
Facility: HOSPITAL | Age: 36
Discharge: HOME | End: 2021-07-06
Attending: EMERGENCY MEDICINE
Payer: MEDICARE

## 2021-07-06 VITALS
DIASTOLIC BLOOD PRESSURE: 72 MMHG | OXYGEN SATURATION: 96 % | RESPIRATION RATE: 16 BRPM | TEMPERATURE: 98.7 F | HEART RATE: 66 BPM | SYSTOLIC BLOOD PRESSURE: 158 MMHG

## 2021-07-06 DIAGNOSIS — K08.89 PAIN, DENTAL: Primary | ICD-10-CM

## 2021-07-06 PROCEDURE — 99282 EMERGENCY DEPT VISIT SF MDM: CPT

## 2021-07-06 PROCEDURE — 99282 EMERGENCY DEPT VISIT SF MDM: CPT | Performed by: EMERGENCY MEDICINE

## 2021-07-07 NOTE — ED PROVIDER NOTES
History  Chief Complaint   Patient presents with    Dental Pain     Pt c/o L upper dental pain     HPI     64-year-old male presenting to the emergency department complaining of left upper dental pain, dental caries  Past medical history significant for diabetes, gastroparesis, hypertension, CKD, hyperlipidemia, retinopathy  Patient was told to come to emergency department to obtain OMS as he is unable to get dental care covered under insurance otherwise  Patient complains of pain on the teeth and has been using Orajel to control his symptoms  Today, pain is less than yesterday  Patient denies fever, swelling in mouth, pain when swallowing  Prior to Admission Medications   Prescriptions Last Dose Informant Patient Reported? Taking? Admelog SoloStar 100 units/mL injection pen   Yes No   Sig: Inject 6 Units under the skin 3 (three) times a day with meals    B-D ULTRAFINE III SHORT PEN 31G X 8 MM MISC  Mother No No   Sig: USE 1 PEN NEEDLE 8 TIMES DAILY   GLUCAGON EMERGENCY 1 MG injection  Mother Yes No   Sig: INJECT 1MG SUBCUTANEOUSLY AS NEEDED (AS DIRECTED)   MAY REPEAT DOSE EVERY 20 MINUTES AS NEEDED   HYDROXYZINE HCL PO   Yes No   Sig: Take by mouth   Patient not taking: Reported on 7/1/2021   Lokelma 5 g PACK  Self Yes No   Sig: see administration instructions Once daily on non dialysis days   Metoclopramide HCl (REGLAN PO)  Self Yes No   Sig: Take by mouth Unknown dose   Patient not taking: Reported on 7/1/2021   NIFEdipine ER (ADALAT CC) 60 MG 24 hr tablet  Mother No No   Sig: Take 1 tablet (60 mg total) by mouth 2 (two) times a day   aspirin (ECOTRIN LOW STRENGTH) 81 mg EC tablet   Yes No   Sig: Take 81 mg by mouth daily   atorvastatin (LIPITOR) 40 mg tablet  Mother No No   Sig: Take 1 tablet (40 mg total) by mouth every evening   b complex vitamins capsule  Mother Yes No   Sig: Take 1 capsule by mouth daily before lunch    calcium acetate (PHOSLO) capsule   Yes No   Sig: TAKE 3 CAPSULES BY MOUTH WITH MEALS   cholecalciferol (VITAMIN D3) 1,000 units tablet  Mother Yes No   Sig: Take 1,000 Units by mouth daily   cinacalcet (SENSIPAR) 60 MG tablet   Yes No   Sig: Take 60 mg by mouth daily Non-dialysis days   cloNIDine (CATAPRES-TTS-3) 0 3 mg/24 hr  Self Yes No   Si patch once a week    doxazosin (CARDURA) 2 mg tablet   No No   Sig: TAKE 2 TABLETS BY MOUTH IN THE MORNING AND 2 IN THE EVENING   doxazosin (CARDURA) 8 MG tablet  Mother Yes No   Sig: Take 8 mg by mouth daily at bedtime   famotidine (PEPCID) 40 MG tablet  Mother No No   Sig: Take 1 tablet (40 mg total) by mouth 2 (two) times a day   Patient taking differently: Take 40 mg by mouth daily    gabapentin (NEURONTIN) 100 mg capsule  Self No No   Sig: Take 2 tablets at bedtime   Patient taking differently: 200 mg Take 2 tablets at bedtime   hydrALAZINE (APRESOLINE) 100 MG tablet  Mother No No   Sig: Take 0 5 tablets (50 mg total) by mouth 2 (two) times a day   insulin glargine (Basaglar KwikPen) 100 units/mL injection pen  Self No No   Sig: Inject 14 Units under the skin daily at bedtime   insulin lispro (HumaLOG) 100 units/mL injection  Mother No No   Sig: Inject 4 Units under the skin 3 (three) times a day with meals   labetalol (NORMODYNE) 200 mg tablet   No No   Sig: Take 2 tablets (400 mg total) by mouth 2 (two) times a day   lisinopril (ZESTRIL) 20 mg tablet  Mother No No   Sig: Take 1 tablet (20 mg total) by mouth daily   loperamide (IMODIUM) 2 mg capsule  Mother No No   Sig: Take 1 capsule (2 mg total) by mouth 3 (three) times a day as needed for diarrhea   Patient not taking: Reported on 2021   metolazone (ZAROXOLYN) 10 mg tablet  Mother Yes No   Sig: Take 10 mg by mouth daily   minoxidil (LONITEN) 2 5 mg tablet  Self Yes No   Sig: Take 2 5 mg by mouth 2 (two) times a day    ondansetron (ZOFRAN) 4 mg tablet  Mother Yes No   Sig: Take 4 mg by mouth every 8 (eight) hours as needed for nausea or vomiting   saccharomyces boulardii (FLORASTOR) 250 mg capsule  Mother No No   Sig: Take 1 capsule (250 mg total) by mouth 2 (two) times a day   sertraline (ZOLOFT) 50 mg tablet   No No   Sig: Take 1 tablet (50 mg total) by mouth daily at bedtime   Patient not taking: Reported on 7/1/2021   sevelamer (RENAGEL) 800 mg tablet  Mother No No   Sig: Take 3 tablets (2,400 mg total) by mouth 3 (three) times a day with meals   Patient not taking: Reported on 5/3/2021   sevelamer carbonate (Renvela) 800 mg tablet  Mother Yes No   Sig: 3 (three) times a day with meals    Patient not taking: Reported on 7/1/2021   torsemide (DEMADEX) 100 mg tablet  Mother Yes No   Sig: Take 100 mg by mouth 2 (two) times a day       Facility-Administered Medications: None       Past Medical History:   Diagnosis Date    [facility]     Ambulates with cane     Anuria     Anxiety     Cellulitis of right elbow 3/31/2021    Chronic kidney disease     Depression     Diabetes mellitus ([facility] )     Diarrhea     Falls     Gastroparesis     GERD (gastroesophageal reflux disease)     History of shingles 2010    History of transfusion 02/2018    no adverse reaction    Hyperlipidemia     Hyperphosphatemia     Hypertension     Itching     Muscle weakness     general unsteadiness    PONV (postoperative nausea and vomiting) 1/26/2018    Recurrent peritonitis ([address] ) due to peritoneal dialysis catheter 7/31/2020    Retinopathy     Seizures ([facility] )     early 2020 - one time    Skin abnormality     some dime size areas where skin was scratched from itching    Spontaneous bacterial peritonitis ([facility] [facility] ) 10/19/2020    Stroke ([facility] )     x2 - off balance/no driving/fatigue    Swelling of both lower extremities     Vomiting     Wears glasses        Past Surgical History:   Procedure Laterality Date    CARDIAC LOOP RECORDER  05/2018    COLONOSCOPY      EGD      EYE SURGERY Right     IR AV FISTULAGRAM/GRAFTOGRAM  2/23/2021    IR TUNNELED CENTRAL LINE PLACEMENT  2/16/2021    IR TUNNELED DIALYSIS CATHETER PLACEMENT  11/18/2020    IR TUNNELED DIALYSIS CATHETER REMOVAL  2/12/2021    IR TUNNELED DIALYSIS CATHETER REMOVAL  3/11/2021    PERITONEAL CATHETER INSERTION N/A 8/27/2018    Procedure: UNROOF PD CATHETER;  Surgeon: Serafin Crespo DO;  [facility];  Service: General    CT ANASTOMOSIS,AV,ANY SITE Left 11/9/2020    Procedure: CREATION FISTULA  ARTERIOVENOUS (AV) - LEFT WRIST;  Surgeon: Odilia Johnson MD;  [facility];  Service: Vascular    CT ESOPHAGOGASTRODUODENOSCOPY TRANSORAL DIAGNOSTIC N/A 4/18/2019    Procedure: ESOPHAGOGASTRODUODENOSCOPY (EGD); Surgeon: Mandeep Reyez MD;  [facility]; Service: Gastroenterology    CT LAP INSERTION TUNNELED INTRAPERITONEAL CATHETER N/A 8/6/2018    Procedure: LAPAROSCOPIC PD CATHETER PLACEMENT;  Surgeon: Serafin Crespo DO;  [facility];  Service: General    CT REMOVAL TUNNELED INTRAPERITONEAL CATHETER N/A 11/18/2020    Procedure: REMOVAL CATHETER PERITONEAL DIALYSIS;  Surgeon: Ed Raza MD;  [facility];  Service: General    TONSILLECTOMY      UPPER GASTROINTESTINAL ENDOSCOPY         Family History   Problem Relation Age of Onset    Breast cancer Mother     Hypertension Mother     Hyperlipidemia Father     Hypertension Father     Leukemia Maternal Grandmother     Hyperlipidemia Maternal Grandfather     Hypertension Maternal Grandfather     Hyperlipidemia Paternal Grandmother     Hypertension Paternal Grandmother     Heart disease Paternal Grandfather         cardiac disorder    Diabetes Paternal Grandfather      I have reviewed and agree with the history as documented      E-Cigarette/Vaping    E-Cigarette Use Current Every Day User     Comments medical marijuana      E-Cigarette/Vaping Substances    Nicotine No     THC Yes     CBD Yes     Flavoring No     Other No     Unknown No      Social History     Tobacco Use    Smoking status: Former Smoker     Packs/day: 0 50 Years: 12 00     Pack years: 6 00     Types: Cigarettes     Quit date: 02/2018     Years since quitting: 3 4    Smokeless tobacco: Never Used    Tobacco comment: quit 2/2018   Vaping Use    Vaping Use: Every day    Substances: THC, CBD   Substance Use Topics    Alcohol use: Not Currently    Drug use: Yes     Types: Marijuana     Comment: medical marijuana       Review of Systems   HENT: Positive for dental problem  Negative for drooling, facial swelling, trouble swallowing and voice change  Respiratory: Negative for shortness of breath  Cardiovascular: Negative for chest pain  Gastrointestinal: Negative for nausea and vomiting  Physical Exam  Physical Exam  Vitals and nursing note reviewed  Constitutional:       General: He is not in acute distress  Appearance: He is well-developed  He is not toxic-appearing or diaphoretic  HENT:      Head: Normocephalic and atraumatic  Right Ear: External ear normal       Left Ear: External ear normal       Nose: Nose normal       Mouth/Throat:      Mouth: Mucous membranes are moist         Comments: Pain noted in left upper molars  Scattered dental caries and missing teeth noted  No gingival inflammation noted  No bleeding  No dental abscesses noted  Eyes:      General:         Right eye: No discharge  Left eye: No discharge  Extraocular Movements: Extraocular movements intact  Conjunctiva/sclera: Conjunctivae normal       Pupils: Pupils are equal, round, and reactive to light  Cardiovascular:      Rate and Rhythm: Normal rate and regular rhythm  Heart sounds: Normal heart sounds  No friction rub  No gallop  Pulmonary:      Effort: Pulmonary effort is normal  No respiratory distress  Breath sounds: Normal breath sounds  No stridor  No wheezing, rhonchi or rales  Chest:      Chest wall: No tenderness  Musculoskeletal:         General: No tenderness or deformity  Normal range of motion        Cervical back: Normal range of motion and neck supple  Skin:     General: Skin is warm and dry  Capillary Refill: Capillary refill takes less than 2 seconds  Neurological:      Mental Status: He is alert and oriented to person, place, and time  Psychiatric:         Mood and Affect: Mood normal          Vital Signs  ED Triage Vitals [07/06/21 1529]   Temperature Pulse Respirations Blood Pressure SpO2   98 7 °F (37 1 °C) 66 16 158/72 96 %      Temp Source Heart Rate Source Patient Position - Orthostatic VS BP Location FiO2 (%)   Oral Monitor Sitting Right arm --      Pain Score       --           Vitals:    07/06/21 1529   BP: 158/72   Pulse: 66   Patient Position - Orthostatic VS: Sitting         Visual Acuity      ED Medications  Medications - No data to display    Diagnostic Studies  Results Reviewed     None                 No orders to display              Procedures  Procedures         ED Course        no evidence of dental infection or abscess at this time  At this time, patient is not requesting anything for pain medication  Referral was written for OMS  Recommended patient follow-up as soon as possible  Patient verbalized understanding and will follow up as an outpatient  Strict return precautions given  Upon discharge, patient was fully alert, oriented, GCS 15, ambulatory, any further complaints  MDM    Disposition  Final diagnoses:   Pain, dental     Time reflects when diagnosis was documented in both MDM as applicable and the Disposition within this note     Time User Action Codes Description Comment    7/6/2021  7:59 PM [place] Mary Add [K08 89] Pain, dental       ED Disposition     ED Disposition Condition Date/Time Comment    Discharge Stable Tue Jul 6, 2021  7:59 PM Malika Cardoso discharge to home/self care              Follow-up Information     Follow up With Specialties Details Why Contact Info    Heather Palma DMD Oral Maxillofacial Surgery Schedule an appointment as soon as possible for a visit in 1 week As needed [address] [place] [address] [address] [facility]            Discharge Medication List as of 7/6/2021  8:04 PM      CONTINUE these medications which have NOT CHANGED    Details   Admelog SoloStar 100 units/mL injection pen Inject 6 Units under the skin 3 (three) times a day with meals , Starting Fri 2/5/2021, Historical Med      aspirin (ECOTRIN LOW STRENGTH) 81 mg EC tablet Take 81 mg by mouth daily, Historical Med      atorvastatin (LIPITOR) 40 mg tablet Take 1 tablet (40 mg total) by mouth every evening, Starting Tue 3/23/2021, Normal      b complex vitamins capsule Take 1 capsule by mouth daily before lunch , Historical Med      B-D ULTRAFINE III SHORT PEN 31G X 8 MM MISC USE 1 PEN NEEDLE 8 TIMES DAILY, Normal      calcium acetate (PHOSLO) capsule TAKE 3 CAPSULES BY MOUTH WITH MEALS, Historical Med      cholecalciferol (VITAMIN D3) 1,000 units tablet Take 1,000 Units by mouth daily, Historical Med      cinacalcet (SENSIPAR) 60 MG tablet Take 60 mg by mouth daily Non-dialysis days, Historical Med      cloNIDine (CATAPRES-TTS-3) 0 3 mg/24 hr 1 patch once a week , Historical Med      !! doxazosin (CARDURA) 2 mg tablet TAKE 2 TABLETS BY MOUTH IN THE MORNING AND 2 IN THE EVENING, Normal      !! doxazosin (CARDURA) 8 MG tablet Take 8 mg by mouth daily at bedtime, Historical Med      famotidine (PEPCID) 40 MG tablet Take 1 tablet (40 mg total) by mouth 2 (two) times a day, Starting Mon 12/7/2020, Normal      gabapentin (NEURONTIN) 100 mg capsule Take 2 tablets at bedtime, Normal      GLUCAGON EMERGENCY 1 MG injection INJECT 1MG SUBCUTANEOUSLY AS NEEDED (AS DIRECTED)   MAY REPEAT DOSE EVERY 20 MINUTES AS NEEDED, Historical Med      hydrALAZINE (APRESOLINE) 100 MG tablet Take 0 5 tablets (50 mg total) by mouth 2 (two) times a day, Starting Thu 11/19/2020, Normal      HYDROXYZINE HCL PO Take by mouth, Historical Med      insulin glargine (Basaglar KwikPen) 100 units/mL injection pen Inject 14 Units under the skin daily at bedtime, Starting Thu 12/17/2020, No Print      insulin lispro (HumaLOG) 100 units/mL injection Inject 4 Units under the skin 3 (three) times a day with meals, Starting Wed 11/25/2020, Normal      labetalol (NORMODYNE) 200 mg tablet Take 2 tablets (400 mg total) by mouth 2 (two) times a day, Starting Sun 4/4/2021, Normal      lisinopril (ZESTRIL) 20 mg tablet Take 1 tablet (20 mg total) by mouth daily, Starting Tue 10/27/2020, Normal      Lokelma 5 g PACK see administration instructions Once daily on non dialysis days, Starting Tue 3/16/2021, Historical Med      loperamide (IMODIUM) 2 mg capsule Take 1 capsule (2 mg total) by mouth 3 (three) times a day as needed for diarrhea, Starting Mon 10/26/2020, Normal      Metoclopramide HCl (REGLAN PO) Take by mouth Unknown dose, Historical Med      metolazone (ZAROXOLYN) 10 mg tablet Take 10 mg by mouth daily, Historical Med      minoxidil (LONITEN) 2 5 mg tablet Take 2 5 mg by mouth 2 (two) times a day , Starting Tue 2/9/2021, Historical Med      NIFEdipine ER (ADALAT CC) 60 MG 24 hr tablet Take 1 tablet (60 mg total) by mouth 2 (two) times a day, Starting Tue 3/3/2020, No Print      ondansetron (ZOFRAN) 4 mg tablet Take 4 mg by mouth every 8 (eight) hours as needed for nausea or vomiting, Historical Med      saccharomyces boulardii (FLORASTOR) 250 mg capsule Take 1 capsule (250 mg total) by mouth 2 (two) times a day, Starting Mon 10/26/2020, Normal      sertraline (ZOLOFT) 50 mg tablet Take 1 tablet (50 mg total) by mouth daily at bedtime, Starting Fri 6/11/2021, Normal      sevelamer (RENAGEL) 800 mg tablet Take 3 tablets (2,400 mg total) by mouth 3 (three) times a day with meals, Starting Wed 11/25/2020, Normal      sevelamer carbonate (Renvela) 800 mg tablet 3 (three) times a day with meals , Historical Med      torsemide (DEMADEX) 100 mg tablet Take 100 mg by mouth 2 (two) times a day , [facility] Med !! - Potential duplicate medications found  Please discuss with provider              PDMP Review       Value Time User    PDMP Reviewed  Yes 11/11/2020 10:46 AM Gagandeep Howell PA-C          ED Provider  Electronically Signed by           Onel Almendarez MD  07/06/21 6058

## 2021-07-07 NOTE — DISCHARGE INSTRUCTIONS
Please make sure to follow-up with OMFS  A referral has been placed  If you develop any worsening symptoms including fever, swelling, significant pain, difficulty swallowing, or any other concerning symptoms, please return to the emergency department as these could be signs of worsening illness

## 2021-07-13 ENCOUNTER — APPOINTMENT (OUTPATIENT)
Dept: LAB | Facility: CLINIC | Age: 36
End: 2021-07-13
Payer: MEDICARE

## 2021-07-13 LAB
ALBUMIN SERPL BCP-MCNC: 3.2 G/DL (ref 3.5–5)
ALP SERPL-CCNC: 96 U/L (ref 46–116)
ALT SERPL W P-5'-P-CCNC: 26 U/L (ref 12–78)
ANION GAP SERPL CALCULATED.3IONS-SCNC: 10 MMOL/L (ref 4–13)
AST SERPL W P-5'-P-CCNC: 18 U/L (ref 5–45)
BASOPHILS # BLD AUTO: 0.07 THOUSANDS/ΜL (ref 0–0.1)
BASOPHILS NFR BLD AUTO: 1 % (ref 0–1)
BILIRUB SERPL-MCNC: 0.56 MG/DL (ref 0.2–1)
BUN SERPL-MCNC: 27 MG/DL (ref 5–25)
CALCIUM ALBUM COR SERPL-MCNC: 9.3 MG/DL (ref 8.3–10.1)
CALCIUM SERPL-MCNC: 8.7 MG/DL (ref 8.3–10.1)
CHLORIDE SERPL-SCNC: 104 MMOL/L (ref 100–108)
CO2 SERPL-SCNC: 29 MMOL/L (ref 21–32)
CREAT SERPL-MCNC: 7.69 MG/DL (ref 0.6–1.3)
EOSINOPHIL # BLD AUTO: 0.76 THOUSAND/ΜL (ref 0–0.61)
EOSINOPHIL NFR BLD AUTO: 12 % (ref 0–6)
ERYTHROCYTE [DISTWIDTH] IN BLOOD BY AUTOMATED COUNT: 14.4 % (ref 11.6–15.1)
GFR SERPL CREATININE-BSD FRML MDRD: 8 ML/MIN/1.73SQ M
GLUCOSE SERPL-MCNC: 187 MG/DL (ref 65–140)
HCT VFR BLD AUTO: 29.7 % (ref 36.5–49.3)
HGB BLD-MCNC: 9.6 G/DL (ref 12–17)
IMM GRANULOCYTES # BLD AUTO: 0.04 THOUSAND/UL (ref 0–0.2)
IMM GRANULOCYTES NFR BLD AUTO: 1 % (ref 0–2)
LYMPHOCYTES # BLD AUTO: 1.25 THOUSANDS/ΜL (ref 0.6–4.47)
LYMPHOCYTES NFR BLD AUTO: 20 % (ref 14–44)
MCH RBC QN AUTO: 32 PG (ref 26.8–34.3)
MCHC RBC AUTO-ENTMCNC: 32.3 G/DL (ref 31.4–37.4)
MCV RBC AUTO: 99 FL (ref 82–98)
MONOCYTES # BLD AUTO: 0.43 THOUSAND/ΜL (ref 0.17–1.22)
MONOCYTES NFR BLD AUTO: 7 % (ref 4–12)
NEUTROPHILS # BLD AUTO: 3.73 THOUSANDS/ΜL (ref 1.85–7.62)
NEUTS SEG NFR BLD AUTO: 59 % (ref 43–75)
NRBC BLD AUTO-RTO: 0 /100 WBCS
PLATELET # BLD AUTO: 288 THOUSANDS/UL (ref 149–390)
PMV BLD AUTO: 9.6 FL (ref 8.9–12.7)
POTASSIUM SERPL-SCNC: 4.9 MMOL/L (ref 3.5–5.3)
PROT SERPL-MCNC: 6.4 G/DL (ref 6.4–8.2)
RBC # BLD AUTO: 3 MILLION/UL (ref 3.88–5.62)
SODIUM SERPL-SCNC: 143 MMOL/L (ref 136–145)
WBC # BLD AUTO: 6.28 THOUSAND/UL (ref 4.31–10.16)

## 2021-07-13 PROCEDURE — 85025 COMPLETE CBC W/AUTO DIFF WBC: CPT

## 2021-07-13 PROCEDURE — 80053 COMPREHEN METABOLIC PANEL: CPT

## 2021-07-13 PROCEDURE — 36415 COLL VENOUS BLD VENIPUNCTURE: CPT

## 2021-07-26 DIAGNOSIS — E10.42 DIABETIC POLYNEUROPATHY ASSOCIATED WITH TYPE 1 DIABETES MELLITUS (HCC): ICD-10-CM

## 2021-07-26 RX ORDER — GABAPENTIN 100 MG/1
CAPSULE ORAL
Qty: 60 CAPSULE | Refills: 5 | Status: SHIPPED | OUTPATIENT
Start: 2021-07-26 | End: 2022-01-17 | Stop reason: SDUPTHER

## 2021-07-29 ENCOUNTER — HOSPITAL ENCOUNTER (OUTPATIENT)
Dept: NON INVASIVE DIAGNOSTICS | Facility: HOSPITAL | Age: 36
Discharge: HOME | End: 2021-07-29
Attending: INTERNAL MEDICINE | Admitting: INTERNAL MEDICINE
Payer: MEDICARE

## 2021-07-29 VITALS
DIASTOLIC BLOOD PRESSURE: 79 MMHG | RESPIRATION RATE: 18 BRPM | SYSTOLIC BLOOD PRESSURE: 154 MMHG | HEART RATE: 76 BPM | HEIGHT: 71 IN | BODY MASS INDEX: 29.85 KG/M2 | OXYGEN SATURATION: 98 % | TEMPERATURE: 98.1 F | WEIGHT: 213.19 LBS

## 2021-07-29 DIAGNOSIS — N18.6 ESRD (END STAGE RENAL DISEASE) (HCC): ICD-10-CM

## 2021-07-29 LAB
ANION GAP SERPL CALCULATED.3IONS-SCNC: 5 MMOL/L (ref 4–13)
ATRIAL RATE: 75 BPM
BUN SERPL-MCNC: 23 MG/DL (ref 5–25)
CALCIUM SERPL-MCNC: 8.6 MG/DL (ref 8.3–10.1)
CHLORIDE SERPL-SCNC: 102 MMOL/L (ref 100–108)
CO2 SERPL-SCNC: 31 MMOL/L (ref 21–32)
CREAT SERPL-MCNC: 6.82 MG/DL (ref 0.6–1.3)
GFR SERPL CREATININE-BSD FRML MDRD: 9 ML/MIN/1.73SQ M
GLUCOSE P FAST SERPL-MCNC: 179 MG/DL (ref 65–99)
GLUCOSE SERPL-MCNC: 149 MG/DL (ref 65–140)
GLUCOSE SERPL-MCNC: 179 MG/DL (ref 65–140)
P AXIS: 43 DEGREES
POTASSIUM SERPL-SCNC: 4.8 MMOL/L (ref 3.5–5.3)
PR INTERVAL: 182 MS
QRS AXIS: 61 DEGREES
QRSD INTERVAL: 74 MS
QT INTERVAL: 424 MS
QTC INTERVAL: 473 MS
SODIUM SERPL-SCNC: 138 MMOL/L (ref 136–145)
T WAVE AXIS: 73 DEGREES
VENTRICULAR RATE: 75 BPM

## 2021-07-29 PROCEDURE — 93453 R&L HRT CATH W/VENTRICLGRPHY: CPT | Performed by: INTERNAL MEDICINE

## 2021-07-29 PROCEDURE — 82810 BLOOD GASES O2 SAT ONLY: CPT | Performed by: INTERNAL MEDICINE

## 2021-07-29 PROCEDURE — C1769 GUIDE WIRE: HCPCS | Performed by: INTERNAL MEDICINE

## 2021-07-29 PROCEDURE — 82948 REAGENT STRIP/BLOOD GLUCOSE: CPT

## 2021-07-29 PROCEDURE — 93010 ELECTROCARDIOGRAM REPORT: CPT | Performed by: INTERNAL MEDICINE

## 2021-07-29 PROCEDURE — C1894 INTRO/SHEATH, NON-LASER: HCPCS | Performed by: INTERNAL MEDICINE

## 2021-07-29 PROCEDURE — 99152 MOD SED SAME PHYS/QHP 5/>YRS: CPT | Performed by: INTERNAL MEDICINE

## 2021-07-29 PROCEDURE — 93460 R&L HRT ART/VENTRICLE ANGIO: CPT | Performed by: INTERNAL MEDICINE

## 2021-07-29 PROCEDURE — 99153 MOD SED SAME PHYS/QHP EA: CPT | Performed by: INTERNAL MEDICINE

## 2021-07-29 PROCEDURE — 93005 ELECTROCARDIOGRAM TRACING: CPT

## 2021-07-29 PROCEDURE — NC001 PR NO CHARGE: Performed by: INTERNAL MEDICINE

## 2021-07-29 PROCEDURE — 80048 BASIC METABOLIC PNL TOTAL CA: CPT | Performed by: INTERNAL MEDICINE

## 2021-07-29 RX ORDER — NIFEDIPINE 60 MG/1
60 TABLET, EXTENDED RELEASE ORAL 2 TIMES DAILY
Status: DISCONTINUED | OUTPATIENT
Start: 2021-07-29 | End: 2021-07-29 | Stop reason: HOSPADM

## 2021-07-29 RX ORDER — SACCHAROMYCES BOULARDII 250 MG
250 CAPSULE ORAL 2 TIMES DAILY
Status: DISCONTINUED | OUTPATIENT
Start: 2021-07-29 | End: 2021-07-29 | Stop reason: HOSPADM

## 2021-07-29 RX ORDER — SODIUM CHLORIDE 9 MG/ML
125 INJECTION, SOLUTION INTRAVENOUS CONTINUOUS
Status: DISCONTINUED | OUTPATIENT
Start: 2021-07-29 | End: 2021-07-29

## 2021-07-29 RX ORDER — ATORVASTATIN CALCIUM 40 MG/1
40 TABLET, FILM COATED ORAL EVERY EVENING
Status: DISCONTINUED | OUTPATIENT
Start: 2021-07-29 | End: 2021-07-29 | Stop reason: HOSPADM

## 2021-07-29 RX ORDER — GABAPENTIN 100 MG/1
200 CAPSULE ORAL
Status: DISCONTINUED | OUTPATIENT
Start: 2021-07-29 | End: 2021-07-29 | Stop reason: HOSPADM

## 2021-07-29 RX ORDER — LISINOPRIL 20 MG/1
20 TABLET ORAL DAILY
Status: CANCELLED | OUTPATIENT
Start: 2021-07-29

## 2021-07-29 RX ORDER — CINACALCET 30 MG/1
60 TABLET, FILM COATED ORAL DAILY
Status: DISCONTINUED | OUTPATIENT
Start: 2021-07-29 | End: 2021-07-29 | Stop reason: HOSPADM

## 2021-07-29 RX ORDER — ASPIRIN 81 MG/1
81 TABLET ORAL DAILY
Status: DISCONTINUED | OUTPATIENT
Start: 2021-07-29 | End: 2021-07-29 | Stop reason: HOSPADM

## 2021-07-29 RX ORDER — DOXAZOSIN 2 MG/1
2 TABLET ORAL 2 TIMES DAILY
Status: DISCONTINUED | OUTPATIENT
Start: 2021-07-29 | End: 2021-07-29 | Stop reason: HOSPADM

## 2021-07-29 RX ORDER — LOPERAMIDE HYDROCHLORIDE 2 MG/1
2 CAPSULE ORAL 3 TIMES DAILY PRN
Status: DISCONTINUED | OUTPATIENT
Start: 2021-07-29 | End: 2021-07-29 | Stop reason: HOSPADM

## 2021-07-29 RX ORDER — FAMOTIDINE 20 MG/1
40 TABLET, FILM COATED ORAL 2 TIMES DAILY
Status: DISCONTINUED | OUTPATIENT
Start: 2021-07-29 | End: 2021-07-29

## 2021-07-29 RX ORDER — MELATONIN
1000 DAILY
Status: DISCONTINUED | OUTPATIENT
Start: 2021-07-29 | End: 2021-07-29 | Stop reason: HOSPADM

## 2021-07-29 RX ORDER — HEPARIN SODIUM 1000 [USP'U]/ML
INJECTION, SOLUTION INTRAVENOUS; SUBCUTANEOUS CODE/TRAUMA/SEDATION MEDICATION
Status: COMPLETED | OUTPATIENT
Start: 2021-07-29 | End: 2021-07-29

## 2021-07-29 RX ORDER — TORSEMIDE 20 MG/1
100 TABLET ORAL 2 TIMES DAILY
Status: DISCONTINUED | OUTPATIENT
Start: 2021-07-29 | End: 2021-07-29 | Stop reason: HOSPADM

## 2021-07-29 RX ORDER — SEVELAMER HYDROCHLORIDE 800 MG/1
2400 TABLET, FILM COATED ORAL
Status: CANCELLED | OUTPATIENT
Start: 2021-07-29

## 2021-07-29 RX ORDER — LABETALOL 200 MG/1
400 TABLET, FILM COATED ORAL 2 TIMES DAILY
Status: CANCELLED | OUTPATIENT
Start: 2021-07-29

## 2021-07-29 RX ORDER — LOPERAMIDE HYDROCHLORIDE 2 MG/1
2 CAPSULE ORAL 3 TIMES DAILY PRN
Status: CANCELLED | OUTPATIENT
Start: 2021-07-29

## 2021-07-29 RX ORDER — NIFEDIPINE 60 MG/1
60 TABLET, EXTENDED RELEASE ORAL 2 TIMES DAILY
Status: CANCELLED | OUTPATIENT
Start: 2021-07-29

## 2021-07-29 RX ORDER — ASPIRIN 81 MG/1
81 TABLET, CHEWABLE ORAL DAILY
Status: DISCONTINUED | OUTPATIENT
Start: 2021-07-29 | End: 2021-07-29

## 2021-07-29 RX ORDER — INSULIN GLARGINE 100 [IU]/ML
14 INJECTION, SOLUTION SUBCUTANEOUS
Status: CANCELLED | OUTPATIENT
Start: 2021-07-29

## 2021-07-29 RX ORDER — MIDAZOLAM HYDROCHLORIDE 2 MG/2ML
INJECTION, SOLUTION INTRAMUSCULAR; INTRAVENOUS CODE/TRAUMA/SEDATION MEDICATION
Status: COMPLETED | OUTPATIENT
Start: 2021-07-29 | End: 2021-07-29

## 2021-07-29 RX ORDER — LABETALOL 20 MG/4 ML (5 MG/ML) INTRAVENOUS SYRINGE
10
Status: DISCONTINUED | OUTPATIENT
Start: 2021-07-29 | End: 2021-07-29 | Stop reason: HOSPADM

## 2021-07-29 RX ORDER — HYDRALAZINE HYDROCHLORIDE 50 MG/1
50 TABLET, FILM COATED ORAL 2 TIMES DAILY
Status: CANCELLED | OUTPATIENT
Start: 2021-07-29

## 2021-07-29 RX ORDER — MINOXIDIL 2.5 MG/1
2.5 TABLET ORAL 2 TIMES DAILY
Status: CANCELLED | OUTPATIENT
Start: 2021-07-29

## 2021-07-29 RX ORDER — ATORVASTATIN CALCIUM 40 MG/1
40 TABLET, FILM COATED ORAL EVERY EVENING
Status: CANCELLED | OUTPATIENT
Start: 2021-07-29

## 2021-07-29 RX ORDER — ASPIRIN 81 MG/1
324 TABLET, CHEWABLE ORAL ONCE
Status: COMPLETED | OUTPATIENT
Start: 2021-07-29 | End: 2021-07-29

## 2021-07-29 RX ORDER — NITROGLYCERIN 20 MG/100ML
INJECTION INTRAVENOUS CODE/TRAUMA/SEDATION MEDICATION
Status: COMPLETED | OUTPATIENT
Start: 2021-07-29 | End: 2021-07-29

## 2021-07-29 RX ORDER — CLONIDINE HYDROCHLORIDE 0.2 MG/1
0.2 TABLET ORAL ONCE
Status: COMPLETED | OUTPATIENT
Start: 2021-07-29 | End: 2021-07-29

## 2021-07-29 RX ORDER — GABAPENTIN 100 MG/1
100 CAPSULE ORAL 2 TIMES DAILY
Status: DISCONTINUED | OUTPATIENT
Start: 2021-07-29 | End: 2021-07-29

## 2021-07-29 RX ORDER — LABETALOL 200 MG/1
400 TABLET, FILM COATED ORAL 2 TIMES DAILY
Status: DISCONTINUED | OUTPATIENT
Start: 2021-07-29 | End: 2021-07-29 | Stop reason: HOSPADM

## 2021-07-29 RX ORDER — SODIUM CHLORIDE 9 MG/ML
50 INJECTION, SOLUTION INTRAVENOUS CONTINUOUS
Status: DISCONTINUED | OUTPATIENT
Start: 2021-07-29 | End: 2021-07-29

## 2021-07-29 RX ORDER — CALCIUM ACETATE 667 MG/1
2001 CAPSULE ORAL
Status: DISCONTINUED | OUTPATIENT
Start: 2021-07-29 | End: 2021-07-29

## 2021-07-29 RX ORDER — METOLAZONE 10 MG/1
10 TABLET ORAL DAILY
Status: DISCONTINUED | OUTPATIENT
Start: 2021-07-29 | End: 2021-07-29 | Stop reason: HOSPADM

## 2021-07-29 RX ORDER — DOXAZOSIN 2 MG/1
2 TABLET ORAL 2 TIMES DAILY
Status: CANCELLED | OUTPATIENT
Start: 2021-07-29

## 2021-07-29 RX ORDER — SEVELAMER HYDROCHLORIDE 800 MG/1
800 TABLET, FILM COATED ORAL
Status: CANCELLED | OUTPATIENT
Start: 2021-07-29

## 2021-07-29 RX ORDER — TORSEMIDE 20 MG/1
20 TABLET ORAL 2 TIMES DAILY
Status: CANCELLED | OUTPATIENT
Start: 2021-07-29

## 2021-07-29 RX ORDER — METOLAZONE 10 MG/1
10 TABLET ORAL DAILY
Status: CANCELLED | OUTPATIENT
Start: 2021-07-29

## 2021-07-29 RX ORDER — DOXAZOSIN MESYLATE 4 MG/1
8 TABLET ORAL
Status: DISCONTINUED | OUTPATIENT
Start: 2021-07-29 | End: 2021-07-29

## 2021-07-29 RX ORDER — MINOXIDIL 2.5 MG/1
2.5 TABLET ORAL 2 TIMES DAILY
Status: DISCONTINUED | OUTPATIENT
Start: 2021-07-29 | End: 2021-07-29 | Stop reason: HOSPADM

## 2021-07-29 RX ORDER — FENTANYL CITRATE 50 UG/ML
INJECTION, SOLUTION INTRAMUSCULAR; INTRAVENOUS CODE/TRAUMA/SEDATION MEDICATION
Status: COMPLETED | OUTPATIENT
Start: 2021-07-29 | End: 2021-07-29

## 2021-07-29 RX ORDER — CLONIDINE HYDROCHLORIDE 0.2 MG/1
0.2 TABLET ORAL EVERY 12 HOURS SCHEDULED
Status: DISCONTINUED | OUTPATIENT
Start: 2021-07-29 | End: 2021-07-29

## 2021-07-29 RX ORDER — ONDANSETRON 2 MG/ML
4 INJECTION INTRAMUSCULAR; INTRAVENOUS EVERY 6 HOURS PRN
Status: DISCONTINUED | OUTPATIENT
Start: 2021-07-29 | End: 2021-07-29 | Stop reason: HOSPADM

## 2021-07-29 RX ORDER — NITROGLYCERIN 0.4 MG/1
0.4 TABLET SUBLINGUAL
Status: DISCONTINUED | OUTPATIENT
Start: 2021-07-29 | End: 2021-07-29 | Stop reason: HOSPADM

## 2021-07-29 RX ORDER — CINACALCET 30 MG/1
60 TABLET, FILM COATED ORAL DAILY
Status: CANCELLED | OUTPATIENT
Start: 2021-07-29

## 2021-07-29 RX ORDER — HYDRALAZINE HYDROCHLORIDE 20 MG/ML
10 INJECTION INTRAMUSCULAR; INTRAVENOUS EVERY 4 HOURS PRN
Status: DISCONTINUED | OUTPATIENT
Start: 2021-07-29 | End: 2021-07-29 | Stop reason: HOSPADM

## 2021-07-29 RX ORDER — LISINOPRIL 10 MG/1
20 TABLET ORAL DAILY
Status: DISCONTINUED | OUTPATIENT
Start: 2021-07-29 | End: 2021-07-29 | Stop reason: HOSPADM

## 2021-07-29 RX ORDER — LIDOCAINE HYDROCHLORIDE 10 MG/ML
INJECTION, SOLUTION EPIDURAL; INFILTRATION; INTRACAUDAL; PERINEURAL CODE/TRAUMA/SEDATION MEDICATION
Status: COMPLETED | OUTPATIENT
Start: 2021-07-29 | End: 2021-07-29

## 2021-07-29 RX ORDER — ASPIRIN 81 MG/1
81 TABLET ORAL DAILY
Status: CANCELLED | OUTPATIENT
Start: 2021-07-30

## 2021-07-29 RX ORDER — HYDRALAZINE HYDROCHLORIDE 50 MG/1
50 TABLET, FILM COATED ORAL 2 TIMES DAILY
Status: DISCONTINUED | OUTPATIENT
Start: 2021-07-29 | End: 2021-07-29 | Stop reason: HOSPADM

## 2021-07-29 RX ORDER — INSULIN GLARGINE 100 [IU]/ML
14 INJECTION, SOLUTION SUBCUTANEOUS
Status: DISCONTINUED | OUTPATIENT
Start: 2021-07-29 | End: 2021-07-29 | Stop reason: HOSPADM

## 2021-07-29 RX ORDER — MELATONIN
1000 DAILY
Status: CANCELLED | OUTPATIENT
Start: 2021-07-29

## 2021-07-29 RX ORDER — ACETAMINOPHEN 325 MG/1
650 TABLET ORAL EVERY 4 HOURS PRN
Status: DISCONTINUED | OUTPATIENT
Start: 2021-07-29 | End: 2021-07-29 | Stop reason: HOSPADM

## 2021-07-29 RX ORDER — SEVELAMER HYDROCHLORIDE 800 MG/1
2400 TABLET, FILM COATED ORAL
Status: DISCONTINUED | OUTPATIENT
Start: 2021-07-29 | End: 2021-07-29

## 2021-07-29 RX ORDER — FAMOTIDINE 20 MG/1
40 TABLET, FILM COATED ORAL 2 TIMES DAILY
Status: CANCELLED | OUTPATIENT
Start: 2021-07-29

## 2021-07-29 RX ORDER — CALCIUM ACETATE 667 MG/1
1334 CAPSULE ORAL
Status: CANCELLED | OUTPATIENT
Start: 2021-07-29

## 2021-07-29 RX ORDER — VERAPAMIL HYDROCHLORIDE 2.5 MG/ML
INJECTION, SOLUTION INTRAVENOUS CODE/TRAUMA/SEDATION MEDICATION
Status: COMPLETED | OUTPATIENT
Start: 2021-07-29 | End: 2021-07-29

## 2021-07-29 RX ADMIN — FENTANYL CITRATE 50 MCG: 50 INJECTION INTRAMUSCULAR; INTRAVENOUS at 08:19

## 2021-07-29 RX ADMIN — HEPARIN SODIUM 4000 UNITS: 1000 INJECTION INTRAVENOUS; SUBCUTANEOUS at 08:27

## 2021-07-29 RX ADMIN — NIFEDIPINE 60 MG: 60 TABLET, FILM COATED, EXTENDED RELEASE ORAL at 17:22

## 2021-07-29 RX ADMIN — VERAPAMIL HYDROCHLORIDE 2.5 MG: 2.5 INJECTION, SOLUTION INTRAVENOUS at 08:28

## 2021-07-29 RX ADMIN — LIDOCAINE HYDROCHLORIDE 1 ML: 10 INJECTION, SOLUTION EPIDURAL; INFILTRATION; INTRACAUDAL; PERINEURAL at 08:18

## 2021-07-29 RX ADMIN — LABETALOL 20 MG/4 ML (5 MG/ML) INTRAVENOUS SYRINGE 10 MG: at 19:24

## 2021-07-29 RX ADMIN — MIDAZOLAM 1 MG: 1 INJECTION INTRAMUSCULAR; INTRAVENOUS at 08:08

## 2021-07-29 RX ADMIN — HYDRALAZINE HYDROCHLORIDE 10 MG: 20 INJECTION, SOLUTION INTRAMUSCULAR; INTRAVENOUS at 15:51

## 2021-07-29 RX ADMIN — LABETALOL 20 MG/4 ML (5 MG/ML) INTRAVENOUS SYRINGE 10 MG: at 19:08

## 2021-07-29 RX ADMIN — CLONIDINE HYDROCHLORIDE 0.2 MG: 0.2 TABLET ORAL at 17:21

## 2021-07-29 RX ADMIN — LIDOCAINE HYDROCHLORIDE 2 ML: 10 INJECTION, SOLUTION EPIDURAL; INFILTRATION; INTRACAUDAL; PERINEURAL at 08:18

## 2021-07-29 RX ADMIN — Medication 200 MCG: at 08:28

## 2021-07-29 RX ADMIN — METOLAZONE 10 MG: 10 TABLET ORAL at 12:37

## 2021-07-29 RX ADMIN — ASPIRIN 81 MG 324 MG: 81 TABLET ORAL at 07:30

## 2021-07-29 RX ADMIN — MIDAZOLAM 1 MG: 1 INJECTION INTRAMUSCULAR; INTRAVENOUS at 08:19

## 2021-07-29 RX ADMIN — ATORVASTATIN CALCIUM 40 MG: 40 TABLET, FILM COATED ORAL at 17:22

## 2021-07-29 RX ADMIN — IOHEXOL 116 ML: 350 INJECTION, SOLUTION INTRAVENOUS at 08:46

## 2021-07-29 RX ADMIN — TORSEMIDE 100 MG: 20 TABLET ORAL at 12:38

## 2021-07-29 RX ADMIN — HYDRALAZINE HYDROCHLORIDE 50 MG: 50 TABLET, FILM COATED ORAL at 17:22

## 2021-07-29 RX ADMIN — SODIUM CHLORIDE 50 ML/HR: 0.9 INJECTION, SOLUTION INTRAVENOUS at 07:30

## 2021-07-29 RX ADMIN — LABETALOL 20 MG/4 ML (5 MG/ML) INTRAVENOUS SYRINGE 10 MG: at 18:55

## 2021-07-29 RX ADMIN — FENTANYL CITRATE 50 MCG: 50 INJECTION INTRAMUSCULAR; INTRAVENOUS at 08:08

## 2021-07-29 NOTE — CASE MANAGEMENT
CM notified by RN that patient is in need of a [address] [place] [address] attachment - R arm  Order placed via [place] and sent to Dr Manuel Brooks for signature

## 2021-07-29 NOTE — H&P
H&P Exam - Cardiology   [facility] [place] 38 y o  male MRN: [id number]  Unit/Bed#: SSC 06-01 Encounter: [id number]     Office cardiologist: Dr Russ Bland    PCP: Michelle Michel,     ASSESSMENT & PLAN  Assessment/Plan   1  PreOp Risk Stratification for Transplant - plan for rhc/lch this morning  2  ESRD (on HD MWF) - cont per nephro  3  CVA - agree with statin, asa likely beneficial  4  HTN - hypertensive on arrival, will need cont'd med mgmt  4  HLD - cont statin   5  DMII - cont insulin per routine    HISTORY OF PRESENT ILLNESS  History of Present Illness   Gino Thomas is a 38 y o  male and former smoker with a PMH significant for ESRD on HD, CVA, HTN, HlD, DMII who presents to [facility] for an elective LHC/RHC  He denies any cp, sob, n/v, diaphoresis, palpitations, sob, jackson, orthopnea, pnd, progressive ble edema, or near syncope  He is compliant with his medications and HD    He is here today for preop risk stratification sl    HISTORICAL INFORMAITON  Historical Information   Past Medical History:   Diagnosis Date    Acute kidney injury ([address] )     Ambulates with cane     Anuria     Anxiety     Cellulitis of right elbow 3/31/2021    Chronic kidney disease     Depression     Diabetes mellitus ([address] )     Diarrhea     Falls     Gastroparesis     GERD (gastroesophageal reflux disease)     History of shingles 2010    History of transfusion 02/2018    no adverse reaction    Hyperlipidemia     Hyperphosphatemia     Hypertension     Itching     Muscle weakness     general unsteadiness    PONV (postoperative nausea and vomiting) 1/26/2018    Recurrent peritonitis ([address] ) due to peritoneal dialysis catheter 7/31/2020    Retinopathy     Seizures ([address] )     early 2020 - one time    Skin abnormality     some dime size areas where skin was scratched from itching    Spontaneous bacterial peritonitis ([address] ) 10/19/2020    Stroke ([address] )     x2 - off balance/no driving/fatigue    Swelling of both lower extremities     Vomiting     Wears glasses      Past Surgical History:   Procedure Laterality Date    CARDIAC LOOP RECORDER  05/2018    COLONOSCOPY      EGD      EYE SURGERY Right     IR AV FISTULAGRAM/GRAFTOGRAM  2/23/2021    IR TUNNELED CENTRAL LINE PLACEMENT  2/16/2021    IR TUNNELED DIALYSIS CATHETER PLACEMENT  11/18/2020    IR TUNNELED DIALYSIS CATHETER REMOVAL  2/12/2021    IR TUNNELED DIALYSIS CATHETER REMOVAL  3/11/2021    PERITONEAL CATHETER INSERTION N/A 8/27/2018    Procedure: UNROOF PD CATHETER;  Surgeon: Sukhi Grady DO;  [facility];  Service: General    NM ANASTOMOSIS,AV,ANY SITE Left 11/9/2020    Procedure: CREATION FISTULA  ARTERIOVENOUS (AV) - LEFT WRIST;  Surgeon: Sophie Walsh MD;  [facility];  Service: Vascular    NM ESOPHAGOGASTRODUODENOSCOPY TRANSORAL DIAGNOSTIC N/A 4/18/2019    Procedure: ESOPHAGOGASTRODUODENOSCOPY (EGD); Surgeon: Alon Crabtree MD;  [facility];   Service: Gastroenterology    NM LAP INSERTION TUNNELED INTRAPERITONEAL CATHETER N/A 8/6/2018    Procedure: LAPAROSCOPIC PD CATHETER PLACEMENT;  Surgeon: Sukhi Grady DO;  [facility];  Service: General    NM REMOVAL TUNNELED INTRAPERITONEAL CATHETER N/A 11/18/2020    Procedure: REMOVAL CATHETER PERITONEAL DIALYSIS;  Surgeon: Thu Chung MD;  [facility];  Service: General    TONSILLECTOMY      UPPER GASTROINTESTINAL ENDOSCOPY       Social History   Social History     Substance and Sexual Activity   Alcohol Use Not Currently     Social History     Substance and Sexual Activity   Drug Use Yes    Types: Marijuana    Comment: medical marijuana     Social History     Tobacco Use   Smoking Status Former Smoker    Packs/day: 0 50    Years: 12 00    Pack years: 6 00    Types: Cigarettes    Quit date: 02/2018    Years since quitting: 3 4   Smokeless Tobacco Never Used   Tobacco Comment    quit 2/2018     Family History   Problem Relation Age of Onset    Breast cancer Mother     Hypertension Mother     Hyperlipidemia Father     Hypertension Father     Leukemia Maternal Grandmother     Hyperlipidemia Maternal Grandfather     Hypertension Maternal Grandfather     Hyperlipidemia Paternal Grandmother     Hypertension Paternal Grandmother     Heart disease Paternal Grandfather         cardiac disorder    Diabetes Paternal Grandfather        MEDICATIONS & ALLERGIES  Meds/Allergies   all medications and allergies reviewed  Allergies   Allergen Reactions    Sulfa Antibiotics Rash       REVIEW OF SYSTEMS  Review of Systems   Constitutional: Negative  HENT: Negative  Eyes: Positive for visual disturbance  Respiratory: Negative  Cardiovascular: Negative  Gastrointestinal: Negative  Endocrine: Negative  Genitourinary: Negative  Musculoskeletal: Negative  Allergic/Immunologic: Negative  Neurological:        Disequilibrium   Hematological: Negative  Psychiatric/Behavioral: Negative  PHYSICAL EXAM  BP (!) 192/69   Pulse 77   Temp 98 1 °F (36 7 °C) (Temporal)   Resp 18   Ht 5' 11" (1 803 m)   Wt 96 7 kg (213 lb 3 oz)   SpO2 98%   BMI 29 73 kg/m²   Physical Exam  Vitals reviewed  Constitutional:       Appearance: Normal appearance  HENT:      Head: Normocephalic and atraumatic  Right Ear: External ear normal       Left Ear: External ear normal       Nose: Nose normal       Mouth/Throat:      Mouth: Mucous membranes are moist    Eyes:      Conjunctiva/sclera: Conjunctivae normal       Pupils: Pupils are equal, round, and reactive to light  Cardiovascular:      Rate and Rhythm: Normal rate and regular rhythm  Pulses: Normal pulses  Pulmonary:      Effort: Pulmonary effort is normal    Abdominal:      General: There is no distension  Palpations: Abdomen is soft  Musculoskeletal:      Cervical back: Normal range of motion  Skin:     General: Skin is warm and dry     Neurological:      General: No focal deficit present  Mental Status: He is alert and oriented to person, place, and time     Psychiatric:         Mood and Affect: Mood normal          Behavior: Behavior normal          CV DIAGNOSTICS  EKG:  reviewed  ECHO:  reviewed  MPI:  n/a  Previous Cath/PCI:  n/a

## 2021-07-29 NOTE — NURSING NOTE
Pt expressing frustration at restrictions with wrist post cath  Pt reports he wishes he would have known about these restrictions prior so he could have "set up his life better" for having a weight restriction of 10 pounds on his R wrist for two days  Pt reports that he relies upon a cane for ambulation, uses his R arm for this use, and is often restricted with use of his L arm r/t hemodialysis  RN listened, and offered to contact provider about having pt stay overnight if he didn't feel safe going home  Pt reported that he does feel safe going home and would not like his discharge to be delayed  Will continue to monitor

## 2021-07-29 NOTE — DISCHARGE INSTRUCTIONS
1  Please see the post cardiac catheterization dishcarge instructions  No heavy lifting, greater than 10 lbs  or strenuous  activity for 48 hrs  2 Remove band aid tomorrow  Shower and wash area- wrist gently with soap and water- beginning tomorrow  Rinse and pat dry  Apply new water seal band aid  Repeat this process for 5 days  No powders, creams lotions or antibiotic ointments  for 5 days  No tub baths, hot tubs or swimming for 5 days  3  Please call our office ([phone]) if you have any fever, redness, swelling, discharge from your wrist access site  4 No driving for 1 day    Left Heart Catheterization   WHAT YOU NEED TO KNOW:   A left heart catheterization is a procedure to look at your heart and its arteries  You may need this procedure if you have chest pain, heart disease, or your heart is not working as it should  DISCHARGE INSTRUCTIONS:   Call 911 for any of the following:   · You have any of the following signs of a heart attack:      ? Squeezing, pressure, or pain in your chest     ? and  any of the following:     ? Discomfort or pain in your back, neck, jaw, stomach, or arm     ? Shortness of breath    ? Nausea or vomiting    ? Lightheadedness or a sudden cold sweat    · You have any of the following signs of a stroke:      ? Numbness or drooping on one side of your face     ? Weakness in an arm or leg    ? Confusion or difficulty speaking    ? Dizziness, a severe headache, or vision loss    Seek care immediately if:   · Your arm or leg feels warm, tender, and painful  It may look swollen and red  · The leg or arm used for your angiogram is numb, painful, or changes color  · The bruise at your catheter site gets bigger or becomes swollen  · Your wound does not stop bleeding even after you apply firm pressure for 15 minutes  · You have weakness in an arm or leg  · You become confused or have difficulty speaking      · You have dizziness, a severe headache, or vision loss     Contact your healthcare provider if:   · You have a fever  · Your catheter site is red, leaks pus, or smells bad  · You have increasing pain at your catheter site  · You have questions or concerns about your condition or care  Limit activity as directed:   · Avoid unnecessary stair climbing for 48 hours, if a catheter was put in your groin  · Do not place pressure on your arm, hand, or wrist, if the catheter was placed in your wrist  Avoid pushing, pulling, or heavy lifting with that arm  · If you need to cough, support the area where the catheter was inserted with your hand  · Ask your healthcare provider how long you need to limit movement and avoid certain activities  · You may feel like resting more after your procedure  Slowly start to do more each day  Rest when you feel it is needed  Keep your bandage clean and dry:  Ask your healthcare provider when you can bathe and shower  Do not take baths or go in pools or hot tubs  Check your site every day for signs of infection such as swelling, redness, or pus  Drink liquids as directed:  Liquids help flush the dye used for your procedure out of your body  Ask your healthcare provider how much liquid to drink each day, and which liquids to drink  Some foods, such as soup and fruit, also provide liquid  Limit alcohol:  Do not drink alcohol for 24 hours after your procedure  Then limit alcohol  Women should limit alcohol to 1 drink a day  Men should limit alcohol to 2 drinks a day  A drink of alcohol is 12 ounces of beer, 5 ounces of wine, or 1½ ounces of liquor  Do not smoke:  Nicotine and other chemicals in cigarettes and cigars can damage your blood vessels  Ask your healthcare provider for information if you currently smoke and need help to quit  E-cigarettes or smokeless tobacco still contain nicotine  Talk to your healthcare provider before you use these products     Follow up with your healthcare provider as directed: Write down your questions so you remember to ask them during your visits  © Copyright [employer] 2018 Information is for End User's use only and may not be sold, redistributed or otherwise used for commercial purposes  All illustrations and images included in CareNotes® are the copyrighted property of [employer]  or Radha Allen   The above information is an  only  It is not intended as medical advice for individual conditions or treatments  Talk to your doctor, nurse or pharmacist before following any medical regimen to see if it is safe and effective for you  Right Heart Catheterization   AMBULATORY CARE:   What you need to know about right heart catheterization:  Right heart catheterization is a procedure to check the pressure in your heart and lungs  It is also called a Axson-Hortencia or pulmonary artery catheterization  You may need this procedure if you have chest pain, shortness of breath, or a heart condition  You may also need this procedure before heart surgery  How to prepare for the procedure:   · Your healthcare provider will tell you how to prepare  He or she may tell you not to eat or drink anything after midnight on the day of the procedure  Arrange to have someone drive you home when you are discharged  · Tell your provider about all medicines you currently take  He or she will tell you if you need to stop any medicine for the procedure, and when to stop  He or she will tell you which medicines to take or not take on the day of the procedure  · Contrast liquid will be used to show the parts of your heart more clearly in pictures  Tell your provider if you have had an allergic reaction to contrast liquid  · You may need blood or urine tests before your procedure  Talk to your healthcare provider about these or other tests you may need  What will happen during the procedure:   · You will be given medicine to help you relax   You will receive local anesthesia that will numb the area where the catheter will be placed  You will be awake during the procedure so that your healthcare providers can give you instructions  You will need to let them know if you have any discomfort  · Your healthcare provider will insert a catheter into a vein in your arm, neck, or leg  He or she will use an x-ray to guide the catheter to your heart  Contrast liquid will help heart vessels, muscle, or valves be seen more clearly  You may get a warm feeling or slight nausea right after the liquid is injected  This is normal, and will pass quickly  Your provider may remove a small sample of heart tissue and send it to a lab to be tested  He or she may also open a narrow or blocked heart valve or artery  · The catheter may be left in place to monitor pressure in your heart  When the catheter is removed, pressure will be applied to the incision site for at least 30 minutes to help decrease the risk for bleeding  A collagen plug or other closure device may be used to close the site  The site will be covered with a pressure bandage or other pressure device to help stop any bleeding  What to expect after the procedure: You will be taken to a room to rest until you are fully awake  Healthcare providers will monitor you closely for any problems  Do not get out of bed until your healthcare provider says it is okay  When your healthcare provider sees that you are okay, you will be taken to your hospital room  · You may need to lie flat and keep your arm or leg straight for several hours  Arm or leg movement too soon can cause serious bleeding  · Extra liquids will help flush the contrast liquid out  · If the catheter was in your groin and you need to cough, support the catheter site with your hand  Risks of right heart catheterization:  During the procedure, the catheter may tear a vein and cause bleeding  An air bubble may enter your lung, or your lung may collapse   You may have a heart attack  After the procedure, you may have bleeding or develop an infection  You may have damage to a heart valve, or a fistula (abnormal opening) may form between an artery and vein  You may have irregular heartbeats that cause dizziness or fainting  You may get a blood clot in your leg or arm  These problems may become life-threatening  Call your local emergency number (911 in the [address] [place] [address],[address]) if:   · Your catheter site does not stop bleeding even after you apply firm pressure for 10 minutes  · You have any of the following signs of a heart attack:      ? Squeezing, pressure, or pain in your chest    ? You may  also have any of the following:     § Discomfort or pain in your back, neck, jaw, stomach, or arm    § Shortness of breath    § Nausea or vomiting    § Lightheadedness or a sudden cold sweat    · You feel lightheaded, short of breath, or have chest pain  · You cough up blood  Seek care immediately if:   · The catheter site is swollen and filled with blood or is bleeding  · The area where the catheter was placed is swollen and filled with blood or is bleeding  · The leg or arm used for the procedure becomes numb or turns white or blue  · You have a severe headache or feel dizzy  · You have vision changes or loss of vision  · Your arm or leg feels warm, tender, and painful  It may look swollen and red  Call your doctor if:   · You have a fever  · The skin around your catheter site is red, swollen, or has pus coming from it  · Your skin is itchy, swollen, or has a rash  · You have questions or concerns about your condition or care  Limit activity as directed:   · You may feel like resting more after your procedure  Slowly start to do more each day  Rest when you feel it is needed  Ask your healthcare provider how long you need to limit movement and avoid certain activities  He or she will tell you when you can return to your usual daily activities      · If the catheter was placed in your wrist, do not place pressure on your arm, hand, or wrist  Do not push, pull, or lift anything heavy with that arm  · If the catheter was in your groin, limit stair climbing as directed for 48 hours  Support the catheter site with your hand if you need to cough  Drink liquids as directed:  Liquids help flush the contrast liquid out  Ask your healthcare provider how much liquid to drink each day, and which liquids to drink  Care for the procedure area: Your healthcare provider will give you specific instructions  This will include when you can take a shower  Do not soak in a bathtub, pool, or hut tub until your provider says it is okay  Check the area each day for signs of infection, such as swelling, redness, or pus  Follow up with your doctor as directed:  Write down your questions so you remember to ask them during your visits  © [employer] 2021 Information is for End User's use only and may not be sold, redistributed or otherwise used for commercial purposes  All illustrations and images included in CareNotes® are the copyrighted property of [employer]  or [facility] Vane Allen   The above information is an  only  It is not intended as medical advice for individual conditions or treatments  Talk to your doctor, nurse or pharmacist before following any medical regimen to see if it is safe and effective for you

## 2021-08-02 ENCOUNTER — APPOINTMENT (EMERGENCY)
Dept: ULTRASOUND IMAGING | Facility: HOSPITAL | Age: 36
End: 2021-08-02
Payer: MEDICARE

## 2021-08-02 ENCOUNTER — APPOINTMENT (EMERGENCY)
Dept: RADIOLOGY | Facility: HOSPITAL | Age: 36
End: 2021-08-02
Payer: MEDICARE

## 2021-08-02 ENCOUNTER — TELEPHONE (OUTPATIENT)
Dept: OTHER | Facility: OTHER | Age: 36
End: 2021-08-02

## 2021-08-02 ENCOUNTER — HOSPITAL ENCOUNTER (OUTPATIENT)
Dept: ULTRASOUND IMAGING | Facility: HOSPITAL | Age: 36
Discharge: HOME | End: 2021-08-02
Payer: MEDICARE

## 2021-08-02 ENCOUNTER — HOSPITAL ENCOUNTER (EMERGENCY)
Facility: HOSPITAL | Age: 36
Discharge: HOME | End: 2021-08-02
Attending: EMERGENCY MEDICINE | Admitting: EMERGENCY MEDICINE
Payer: MEDICARE

## 2021-08-02 ENCOUNTER — TELEPHONE (OUTPATIENT)
Dept: CARDIOLOGY CLINIC | Facility: CLINIC | Age: 36
End: 2021-08-02

## 2021-08-02 ENCOUNTER — TELEPHONE (OUTPATIENT)
Dept: PSYCHIATRY | Facility: CLINIC | Age: 36
End: 2021-08-02

## 2021-08-02 VITALS
TEMPERATURE: 97.9 F | RESPIRATION RATE: 18 BRPM | DIASTOLIC BLOOD PRESSURE: 70 MMHG | SYSTOLIC BLOOD PRESSURE: 187 MMHG | OXYGEN SATURATION: 95 % | HEART RATE: 80 BPM

## 2021-08-02 DIAGNOSIS — Z99.2 ESRD (END STAGE RENAL DISEASE) ON DIALYSIS (HCC): ICD-10-CM

## 2021-08-02 DIAGNOSIS — N18.6 ESRD (END STAGE RENAL DISEASE) ON DIALYSIS (HCC): ICD-10-CM

## 2021-08-02 DIAGNOSIS — R79.89 ELEVATED SERUM CREATININE: ICD-10-CM

## 2021-08-02 DIAGNOSIS — G89.18 POSTOPERATIVE PAIN: Primary | ICD-10-CM

## 2021-08-02 LAB
ALBUMIN SERPL BCP-MCNC: 3.5 G/DL (ref 3.5–5)
ALP SERPL-CCNC: 92 U/L (ref 46–116)
ALT SERPL W P-5'-P-CCNC: 16 U/L (ref 12–78)
ANION GAP SERPL CALCULATED.3IONS-SCNC: 14 MMOL/L (ref 4–13)
APTT PPP: 34 SECONDS (ref 23–37)
AST SERPL W P-5'-P-CCNC: 15 U/L (ref 5–45)
ATRIAL RATE: 72 BPM
BASOPHILS # BLD AUTO: 0.06 THOUSANDS/ΜL (ref 0–0.1)
BASOPHILS NFR BLD AUTO: 1 % (ref 0–1)
BILIRUB SERPL-MCNC: 0.62 MG/DL (ref 0.2–1)
BUN SERPL-MCNC: 42 MG/DL (ref 5–25)
CALCIUM SERPL-MCNC: 8.4 MG/DL (ref 8.3–10.1)
CHLORIDE SERPL-SCNC: 102 MMOL/L (ref 100–108)
CO2 SERPL-SCNC: 26 MMOL/L (ref 21–32)
CREAT SERPL-MCNC: 10.63 MG/DL (ref 0.6–1.3)
EOSINOPHIL # BLD AUTO: 0.75 THOUSAND/ΜL (ref 0–0.61)
EOSINOPHIL NFR BLD AUTO: 13 % (ref 0–6)
ERYTHROCYTE [DISTWIDTH] IN BLOOD BY AUTOMATED COUNT: 15 % (ref 11.6–15.1)
GFR SERPL CREATININE-BSD FRML MDRD: 5 ML/MIN/1.73SQ M
GLUCOSE SERPL-MCNC: 91 MG/DL (ref 65–140)
HCT VFR BLD AUTO: 26.6 % (ref 36.5–49.3)
HGB BLD-MCNC: 8.6 G/DL (ref 12–17)
IMM GRANULOCYTES # BLD AUTO: 0.02 THOUSAND/UL (ref 0–0.2)
IMM GRANULOCYTES NFR BLD AUTO: 0 % (ref 0–2)
INR PPP: 1.14 (ref 0.84–1.19)
LYMPHOCYTES # BLD AUTO: 1.11 THOUSANDS/ΜL (ref 0.6–4.47)
LYMPHOCYTES NFR BLD AUTO: 20 % (ref 14–44)
MAGNESIUM SERPL-MCNC: 2.6 MG/DL (ref 1.6–2.6)
MCH RBC QN AUTO: 32.2 PG (ref 26.8–34.3)
MCHC RBC AUTO-ENTMCNC: 32.3 G/DL (ref 31.4–37.4)
MCV RBC AUTO: 100 FL (ref 82–98)
MONOCYTES # BLD AUTO: 0.45 THOUSAND/ΜL (ref 0.17–1.22)
MONOCYTES NFR BLD AUTO: 8 % (ref 4–12)
NEUTROPHILS # BLD AUTO: 3.26 THOUSANDS/ΜL (ref 1.85–7.62)
NEUTS SEG NFR BLD AUTO: 58 % (ref 43–75)
NRBC BLD AUTO-RTO: 0 /100 WBCS
P AXIS: 60 DEGREES
PLATELET # BLD AUTO: 208 THOUSANDS/UL (ref 149–390)
PMV BLD AUTO: 9.8 FL (ref 8.9–12.7)
POTASSIUM SERPL-SCNC: 4.9 MMOL/L (ref 3.5–5.3)
PR INTERVAL: 172 MS
PROT SERPL-MCNC: 6.2 G/DL (ref 6.4–8.2)
PROTHROMBIN TIME: 14.7 SECONDS (ref 11.6–14.5)
QRS AXIS: 97 DEGREES
QRSD INTERVAL: 72 MS
QT INTERVAL: 424 MS
QTC INTERVAL: 464 MS
RBC # BLD AUTO: 2.67 MILLION/UL (ref 3.88–5.62)
SODIUM SERPL-SCNC: 142 MMOL/L (ref 136–145)
T WAVE AXIS: 18 DEGREES
TROPONIN I SERPL-MCNC: 0.06 NG/ML
VENTRICULAR RATE: 72 BPM
WBC # BLD AUTO: 5.65 THOUSAND/UL (ref 4.31–10.16)

## 2021-08-02 PROCEDURE — 96374 THER/PROPH/DIAG INJ IV PUSH: CPT

## 2021-08-02 PROCEDURE — 93005 ELECTROCARDIOGRAM TRACING: CPT

## 2021-08-02 PROCEDURE — 93010 ELECTROCARDIOGRAM REPORT: CPT | Performed by: INTERNAL MEDICINE

## 2021-08-02 PROCEDURE — 84484 ASSAY OF TROPONIN QUANT: CPT | Performed by: PHYSICIAN ASSISTANT

## 2021-08-02 PROCEDURE — 99285 EMERGENCY DEPT VISIT HI MDM: CPT | Performed by: PHYSICIAN ASSISTANT

## 2021-08-02 PROCEDURE — 73110 X-RAY EXAM OF WRIST: CPT

## 2021-08-02 PROCEDURE — 93971 EXTREMITY STUDY: CPT

## 2021-08-02 PROCEDURE — 93926 LOWER EXTREMITY STUDY: CPT

## 2021-08-02 PROCEDURE — 36415 COLL VENOUS BLD VENIPUNCTURE: CPT | Performed by: PHYSICIAN ASSISTANT

## 2021-08-02 PROCEDURE — 80053 COMPREHEN METABOLIC PANEL: CPT | Performed by: PHYSICIAN ASSISTANT

## 2021-08-02 PROCEDURE — 99284 EMERGENCY DEPT VISIT MOD MDM: CPT

## 2021-08-02 PROCEDURE — 93971 EXTREMITY STUDY: CPT | Performed by: SURGERY

## 2021-08-02 PROCEDURE — 85025 COMPLETE CBC W/AUTO DIFF WBC: CPT | Performed by: PHYSICIAN ASSISTANT

## 2021-08-02 PROCEDURE — 85730 THROMBOPLASTIN TIME PARTIAL: CPT | Performed by: PHYSICIAN ASSISTANT

## 2021-08-02 PROCEDURE — 85610 PROTHROMBIN TIME: CPT | Performed by: PHYSICIAN ASSISTANT

## 2021-08-02 PROCEDURE — 96376 TX/PRO/DX INJ SAME DRUG ADON: CPT

## 2021-08-02 PROCEDURE — 83735 ASSAY OF MAGNESIUM: CPT | Performed by: PHYSICIAN ASSISTANT

## 2021-08-02 RX ORDER — HYDROMORPHONE HCL/PF 1 MG/ML
0.5 SYRINGE (ML) INJECTION ONCE
Status: COMPLETED | OUTPATIENT
Start: 2021-08-02 | End: 2021-08-02

## 2021-08-02 RX ADMIN — HYDROMORPHONE HYDROCHLORIDE 0.5 MG: 1 INJECTION, SOLUTION INTRAMUSCULAR; INTRAVENOUS; SUBCUTANEOUS at 03:21

## 2021-08-02 RX ADMIN — HYDROMORPHONE HYDROCHLORIDE 0.5 MG: 1 INJECTION, SOLUTION INTRAMUSCULAR; INTRAVENOUS; SUBCUTANEOUS at 07:53

## 2021-08-02 NOTE — TELEPHONE ENCOUNTER
Mother called, pt has hand edema and pain at site of Cardiac cath  He did go to ER , was advised to call our office  Advised mother to have pt elevate hand and arm, can use ice 10 mins on several times aday  And try  Tylenol for the pain  Will call back if hand gets worse  Verbally understood

## 2021-08-02 NOTE — TELEPHONE ENCOUNTER
Patient's father canceled his appointment with dr Sen Phipps today because patient is in the hospital  He was advised to call back to reschedule

## 2021-08-02 NOTE — ED PROVIDER NOTES
History  Chief Complaint   Patient presents with    Hand Swelling     swelling in right hand post ptca 4 days ago     Patient is a 26-year-old male with ESRD on dialysis 3 times weekly, diabetes mellitus, hypertension, stroke currently on 81 mg of aspirin daily, with recent right heart catheterization, left heart catheterization with ventriculography on 07/29/2021 with right brachial vein and right radial artery access the presents emergency department with swelling and right hand 4 days  Patient also verbalizes pain along radial artery insertion site beginning on 07/29/2021 since right radial artery access for heart catheterization  Patient states that he is on the kidney transplant list at [facility]   Patient denies numbness, tingling, loss of power  Patient denies palliative factors with provocative factors of pressure to right wrist   Patient denies not effective treatment  Patient denies fevers, chills, nausea, vomiting, diarrhea, constipation, and urinary symptoms  Patient denies recent fall recent trauma  Patient denies sick contacts recent travel  Patient denies chest pain, shortness of breath, and abdominal pain  History provided by:  Patient   used: No        Prior to Admission Medications   Prescriptions Last Dose Informant Patient Reported? Taking? Admelog SoloStar 100 units/mL injection pen   Yes No   Sig: Inject 6 Units under the skin 3 (three) times a day with meals    B-D ULTRAFINE III SHORT PEN 31G X 8 MM MISC  Mother No No   Sig: USE 1 PEN NEEDLE 8 TIMES DAILY   GLUCAGON EMERGENCY 1 MG injection  Mother Yes No   Sig: INJECT 1MG SUBCUTANEOUSLY AS NEEDED (AS DIRECTED)   MAY REPEAT DOSE EVERY 20 MINUTES AS NEEDED   HYDROXYZINE HCL PO   Yes No   Sig: Take by mouth   Patient not taking: Reported on 7/1/2021   Lokelma 5 g PACK  Self Yes No   Sig: see administration instructions Once daily on non dialysis days   Metoclopramide HCl (REGLAN PO)  Self Yes No   Sig: Take by mouth Unknown dose   Patient not taking: Reported on 2021   NIFEdipine ER (ADALAT CC) 60 MG 24 hr tablet  Mother No No   Sig: Take 1 tablet (60 mg total) by mouth 2 (two) times a day   aspirin (ECOTRIN LOW STRENGTH) 81 mg EC tablet   Yes No   Sig: Take 81 mg by mouth daily   atorvastatin (LIPITOR) 40 mg tablet  Mother No No   Sig: Take 1 tablet (40 mg total) by mouth every evening   b complex vitamins capsule  Mother Yes No   Sig: Take 1 capsule by mouth daily before lunch    calcium acetate (PHOSLO) capsule   Yes No   Sig: TAKE 3 CAPSULES BY MOUTH WITH MEALS   cholecalciferol (VITAMIN D3) 1,000 units tablet  Mother Yes No   Sig: Take 1,000 Units by mouth daily   cinacalcet (SENSIPAR) 60 MG tablet   Yes No   Sig: Take 60 mg by mouth daily Non-dialysis days   cloNIDine (CATAPRES-TTS-3) 0 3 mg/24 hr  Self Yes No   Si patch once a week    doxazosin (CARDURA) 2 mg tablet   No No   Sig: TAKE 2 TABLETS BY MOUTH IN THE MORNING AND 2 IN THE EVENING   doxazosin (CARDURA) 8 MG tablet  Mother Yes No   Sig: Take 8 mg by mouth daily at bedtime   famotidine (PEPCID) 40 MG tablet  Mother No No   Sig: Take 1 tablet (40 mg total) by mouth 2 (two) times a day   Patient taking differently: Take 40 mg by mouth daily    gabapentin (NEURONTIN) 100 mg capsule   No No   Sig: Take 2 tablets at bedtime   hydrALAZINE (APRESOLINE) 100 MG tablet  Mother No No   Sig: Take 0 5 tablets (50 mg total) by mouth 2 (two) times a day   insulin glargine (Basaglar KwikPen) 100 units/mL injection pen  Self No No   Sig: Inject 14 Units under the skin daily at bedtime   insulin lispro (HumaLOG) 100 units/mL injection  Mother No No   Sig: Inject 4 Units under the skin 3 (three) times a day with meals   labetalol (NORMODYNE) 200 mg tablet   No No   Sig: Take 2 tablets (400 mg total) by mouth 2 (two) times a day   lisinopril (ZESTRIL) 20 mg tablet  Mother No No   Sig: Take 1 tablet (20 mg total) by mouth daily   loperamide (IMODIUM) 2 mg capsule Mother No No   Sig: Take 1 capsule (2 mg total) by mouth 3 (three) times a day as needed for diarrhea   Patient not taking: Reported on 7/1/2021   metolazone (ZAROXOLYN) 10 mg tablet  Mother Yes No   Sig: Take 10 mg by mouth daily   minoxidil (LONITEN) 2 5 mg tablet  Self Yes No   Sig: Take 2 5 mg by mouth 2 (two) times a day    ondansetron (ZOFRAN) 4 mg tablet  Mother Yes No   Sig: Take 4 mg by mouth every 8 (eight) hours as needed for nausea or vomiting   saccharomyces boulardii (FLORASTOR) 250 mg capsule  Mother No No   Sig: Take 1 capsule (250 mg total) by mouth 2 (two) times a day   sertraline (ZOLOFT) 50 mg tablet   No No   Sig: Take 1 tablet (50 mg total) by mouth daily at bedtime   Patient not taking: Reported on 7/1/2021   sevelamer (RENAGEL) 800 mg tablet  Mother No No   Sig: Take 3 tablets (2,400 mg total) by mouth 3 (three) times a day with meals   Patient not taking: Reported on 5/3/2021   sevelamer carbonate (Renvela) 800 mg tablet  Mother Yes No   Sig: 3 (three) times a day with meals    Patient not taking: Reported on 7/1/2021   torsemide (DEMADEX) 100 mg tablet  Mother Yes No   Sig: Take 100 mg by mouth 2 (two) times a day       Facility-Administered Medications: None       Past Medical History:   Diagnosis Date    Acute kidney injury ([place] [address] )     Ambulates with cane     Anuria     Anxiety     Cellulitis of right elbow 3/31/2021    Chronic kidney disease     Depression     Diabetes mellitus ([place] [address] )     Diarrhea     Falls     Gastroparesis     GERD (gastroesophageal reflux disease)     History of shingles 2010    History of transfusion 02/2018    no adverse reaction    Hyperlipidemia     Hyperphosphatemia     Hypertension     Itching     Muscle weakness     general unsteadiness    PONV (postoperative nausea and vomiting) 1/26/2018    Recurrent peritonitis ([facility] ) due to peritoneal dialysis catheter 7/31/2020    Retinopathy     Seizures ([place] [address] )     early 2020 - one time    Skin abnormality     some dime size areas where skin was scratched from itching    Spontaneous bacterial per[facility]itis ([facility] ) 10/19/2020    Stroke ([facility] )     x2 - off balance/no driving/fatigue    Swelling of both lower extremities     Vomiting     Wears glasses        Past Surgical History:   Procedure Laterality Date    CARDIAC LOOP RECORDER  05/2018    COLONOSCOPY      EGD      EYE SURGERY Right     IR AV FISTULAGRAM/GRAFTOGRAM  2/23/2021    IR TUNNELED CENTRAL LINE PLACEMENT  2/16/2021    IR TUNNELED DIALYSIS CATHETER PLACEMENT  11/18/2020    IR TUNNELED DIALYSIS CATHETER REMOVAL  2/12/2021    IR TUNNELED DIALYSIS CATHETER REMOVAL  3/11/2021    PERITONEAL CATHETER INSERTION N/A 8/27/2018    Procedure: UNROOF PD CATHETER;  Surgeon: Byron Nicole DO;  [facility];  Service: General    NE ANASTOMOSIS,AV,ANY SITE Left 11/9/2020    Procedure: CREATION FISTULA  ARTERIOVENOUS (AV) - LEFT WRIST;  Surgeon: Rita Pulido MD;  [facility];  Service: Vascular    NE ESOPHAGOGASTRODUODENOSCOPY TRANSORAL DIAGNOSTIC N/A 4/18/2019    Procedure: ESOPHAGOGASTRODUODENOSCOPY (EGD); Surgeon: Liseth Dyer MD;  Location: [facility];   Service: Gastroenterology    NE LAP INSERTION TUNNELED INTRAPERITONEAL CATHETER N/A 8/6/2018    Procedure: LAPAROSCOPIC PD CATHETER PLACEMENT;  Surgeon: Byron Nicole DO;  [facility];  Service: General    NE REMOVAL TUNNELED INTRAPERITONEAL CATHETER N/A 11/18/2020    Procedure: REMOVAL CATHETER PERITONEAL DIALYSIS;  Surgeon: Liv Del Toro MD;  [facility];  Service: General    TONSILLECTOMY      UPPER GASTROINTESTINAL ENDOSCOPY         Family History   Problem Relation Age of Onset   [place] Breast cancer Mother     Hypertension Mother     Hyperlipidemia Father     Hypertension Father     Leukemia Maternal Grandmother     Hyperlipidemia Maternal Grandfather     Hypertension Maternal Grandfather     Hyperlipidemia Paternal Grandmother     Hypertension Paternal Grandmother     Heart disease Paternal Grandfather         cardiac disorder    Diabetes Paternal Grandfather      I have reviewed and agree with the history as documented  E-Cigarette/Vaping    E-Cigarette Use Current Every Day User     Comments medical marijuana      E-Cigarette/Vaping Substances    Nicotine No     THC Yes     CBD Yes     Flavoring No     Other No     Unknown No      Social History     Tobacco Use    Smoking status: Former Smoker     Packs/day: 0 50     Years: 12 00     Pack years: 6 00     Types: Cigarettes     Quit date: 02/2018     Years since quitting: 3 5    Smokeless tobacco: Never Used    Tobacco comment: quit 2/2018   Vaping Use    Vaping Use: Every day    Substances: THC, CBD   Substance Use Topics    Alcohol use: Not Currently    Drug use: Yes     Types: Marijuana     Comment: medical marijuana       Review of Systems   Constitutional: Negative for activity change, appetite change, chills and fever  HENT: Negative for congestion, postnasal drip, rhinorrhea, sinus pressure, sinus pain, sore throat and tinnitus  Eyes: Negative for photophobia and visual disturbance  Respiratory: Negative for cough, chest tightness and shortness of breath  Cardiovascular: Negative for chest pain and palpitations  Gastrointestinal: Negative for abdominal pain, constipation, diarrhea, nausea and vomiting  Genitourinary: Negative for difficulty urinating, dysuria, flank pain, frequency and urgency  Musculoskeletal: Negative for back pain, gait problem, neck pain and neck stiffness  Right wrist pain   Skin: Negative for pallor and rash  Allergic/Immunologic: Negative for environmental allergies and food allergies  Neurological: Negative for dizziness, weakness, numbness and headaches  Psychiatric/Behavioral: Negative for confusion  All other systems reviewed and are negative  Physical Exam  Physical Exam  Vitals and nursing note reviewed  Constitutional:       General: He is awake  Appearance: Normal appearance  He is well-developed  He is not ill-appearing, toxic-appearing or diaphoretic  Comments: BP (!) 218/109 (BP Location: Right arm)   Pulse 78   Temp 97 9 °F (36 6 °C) (Oral)   Resp 19   SpO2 99%      HENT:      Head: Normocephalic and atraumatic  Right Ear: Hearing and external ear normal  No decreased hearing noted  No drainage, swelling or tenderness  No mastoid tenderness  Left Ear: Hearing and external ear normal  No decreased hearing noted  No drainage, swelling or tenderness  No mastoid tenderness  Nose: Nose normal       Mouth/Throat:      Lips: Pink  Mouth: Mucous membranes are moist       Pharynx: Oropharynx is clear  Uvula midline  Eyes:      General: Lids are normal  Vision grossly intact  Right eye: No discharge  Left eye: No discharge  Extraocular Movements: Extraocular movements intact  Conjunctiva/sclera: Conjunctivae normal       Pupils: Pupils are equal, round, and reactive to light  Neck:      Vascular: No JVD  Trachea: Trachea and phonation normal  No tracheal tenderness or tracheal deviation  Cardiovascular:      Rate and Rhythm: Normal rate and regular rhythm  Pulses: Normal pulses  Radial pulses are 2+ on the right side and 2+ on the left side  Posterior tibial pulses are 2+ on the right side and 2+ on the left side  Heart sounds: Normal heart sounds  Pulmonary:      Effort: Pulmonary effort is normal       Breath sounds: Normal breath sounds  No stridor  No decreased breath sounds, wheezing, rhonchi or rales  Chest:      Chest wall: No tenderness  Abdominal:      General: Abdomen is flat  Bowel sounds are normal  There is no distension  Palpations: Abdomen is soft  Abdomen is not rigid  Tenderness: There is no abdominal tenderness  There is no guarding or rebound     Musculoskeletal:         General: Normal range of motion  Arms:       Cervical back: Full passive range of motion without pain, normal range of motion and neck supple  No rigidity  No spinous process tenderness or muscular tenderness  Normal range of motion  Comments: Passive ROM intact  Upper and lower extremity 4/5 bilaterally  Neurovascularly intact  No grinding or clicking of joints       Lymphadenopathy:      Head:      Right side of head: No submental, submandibular, tonsillar, preauricular, posterior auricular or occipital adenopathy  Left side of head: No submental, submandibular, tonsillar, preauricular, posterior auricular or occipital adenopathy  Cervical: No cervical adenopathy  Right cervical: No superficial, deep or posterior cervical adenopathy  Left cervical: No superficial, deep or posterior cervical adenopathy  Skin:     General: Skin is warm  Capillary Refill: Capillary refill takes less than 2 seconds  Neurological:      General: No focal deficit present  Mental Status: He is alert and oriented to person, place, and time  GCS: GCS eye subscore is 4  GCS verbal subscore is 5  GCS motor subscore is 6  Sensory: No sensory deficit  Deep Tendon Reflexes: Reflexes are normal and symmetric  Reflex Scores:       Patellar reflexes are 2+ on the right side and 2+ on the left side  Psychiatric:         Mood and Affect: Mood normal          Speech: Speech normal          Behavior: Behavior normal  Behavior is cooperative  Thought Content:  Thought content normal          Judgment: Judgment normal          Vital Signs  ED Triage Vitals [08/02/21 0238]   Temperature Pulse Respirations Blood Pressure SpO2   97 9 °F (36 6 °C) 78 19 (!) 218/109 99 %      Temp Source Heart Rate Source Patient Position - Orthostatic VS BP Location FiO2 (%)   Oral Monitor Sitting Right arm --      Pain Score       6           Vitals:    08/02/21 0600 08/02/21 0700 08/02/21 0715 08/02/21 0730   BP: (!) 198/84 (!) 193/82  (!) 174/72   Pulse: 76 74 68 70   Patient Position - Orthostatic VS:             Visual Acuity      ED Medications  Medications   HYDROmorphone (DILAUDID) injection 0 5 mg (0 5 mg Intravenous Given 8/2/21 0321)   HYDROmorphone (DILAUDID) injection 0 5 mg (0 5 mg Intravenous Given 8/2/21 8803)       Diagnostic Studies  Results Reviewed     Procedure Component Value Units Date/Time    Protime-INR [[id number]]  (Abnormal) Collected: 08/02/21 0318    Lab Status: Final result Specimen: Blood from Arm, Right Updated: 08/02/21 0356     Protime 14 7 seconds      INR 1 14    APTT [[id number]]  (Normal) Collected: 08/02/21 0318    Lab Status: Final result Specimen: Blood from Arm, Right Updated: 08/02/21 0356     PTT 34 seconds     Troponin I [[id number]]  (Abnormal) Collected: 08/02/21 0318    Lab Status: Final result Specimen: Blood from Arm, Right Updated: 08/02/21 0354     Troponin I 0 06 ng/mL     Comprehensive metabolic panel [[id number]]  (Abnormal) Collected: 08/02/21 0318    Lab Status: Final result Specimen: Blood from Arm, Right Updated: 08/02/21 0352     Sodium 142 mmol/L      Potassium 4 9 mmol/L      Chloride 102 mmol/L      CO2 26 mmol/L      ANION GAP 14 mmol/L      BUN 42 mg/dL      Creatinine 10 63 mg/dL      Glucose 91 mg/dL      Calcium 8 4 mg/dL      AST 15 U/L      ALT 16 U/L      Alkaline Phosphatase 92 U/L      Total Protein 6 2 g/dL      Albumin 3 5 g/dL      Total Bilirubin 0 62 mg/dL      eGFR 5 ml/min/1 73sq m     Narrative:      [place] guidelines for Chronic Kidney Disease (CKD):     Stage 1 with normal or high GFR (GFR > 90 mL/min/1 73 square meters)    Stage 2 Mild CKD (GFR = 60-89 mL/min/1 73 square meters)    Stage 3A Moderate CKD (GFR = 45-59 mL/min/1 73 square meters)    Stage 3B Moderate CKD (GFR = 30-44 mL/min/1 73 square meters)    Stage 4 Severe CKD (GFR = 15-29 mL/min/1 73 square meters)    Stage 5 End Stage CKD (GFR <15 mL/min/1 73 square meters)  Note: GFR calculation is accurate only with a steady state creatinine    Magnesium [[id number]]  (Normal) Collected: 08/02/21 0318    Lab Status: Final result Specimen: Blood from Arm, Right Updated: 08/02/21 0352     Magnesium 2 6 mg/dL     CBC and differential [[id number]]  (Abnormal) Collected: 08/02/21 0318    Lab Status: Final result Specimen: Blood from Arm, Right Updated: 08/02/21 0331     WBC 5 65 Thousand/uL      RBC 2 67 Million/uL      Hemoglobin 8 6 g/dL      Hematocrit 26 6 %       fL      MCH 32 2 pg      MCHC 32 3 g/dL      RDW 15 0 %      MPV 9 8 fL      Platelets 298 Thousands/uL      nRBC 0 /100 WBCs      Neutrophils Relative 58 %      Immat GRANS % 0 %      Lymphocytes Relative 20 %      Monocytes Relative 8 %      Eosinophils Relative 13 %      Basophils Relative 1 %      Neutrophils Absolute 3 26 Thousands/µL      Immature Grans Absolute 0 02 Thousand/uL      Lymphocytes Absolute 1 11 Thousands/µL      Monocytes Absolute 0 45 Thousand/µL      Eosinophils Absolute 0 75 Thousand/µL      Basophils Absolute 0 06 Thousands/µL                  XR wrist 3+ views RIGHT   ED Interpretation by Raymond Plaza PA-C (08/02 0435)   No acute osseous abnormality on initial read      VAS upper limb venous duplex scan, unilateral/limited    (Results Pending)   VAS pseudoaneurysm study    (Results Pending)              Procedures  ECG 12 Lead Documentation Only    Date/Time: 8/2/2021 3:31 AM  Performed by: Raymond Plaza PA-C  Authorized by: Raymond Plaza PA-C     Indications / Diagnosis:  Right wrist pain  ECG reviewed by me, the ED Provider: yes    Patient location:  ED  Previous ECG:     Previous ECG:  Compared to current    Comparison ECG info:   When compared with ECG of July 29, 2021 T-wave inversion less evident in lateral leads    Similarity:  Changes noted    Comparison to cardiac monitor: Yes    Interpretation:     Interpretation: normal    Rate:     ECG rate:  72    ECG rate assessment: normal    Rhythm:     Rhythm: sinus rhythm    Ectopy:     Ectopy: none    QRS:     QRS axis:  Normal    QRS intervals:  Normal  Conduction:     Conduction: normal    ST segments:     ST segments:  Normal  T waves:     T waves: normal               ED Course  ED Course as of Aug 02 0842   Mon Aug 02, 2021   0324 Will re-evaluate blood pressure status post pain medication delivery      0359 Patient has chronically elevated troponin      0359 Patient states that his      0400 Blood Pressure(!): 208/87   0400 Pulse: 72   0400 SpO2: 99 %   0400 Patient states that he has dialysis today at 10:30 a m  Scheduled appointment      0403 Patient with recent cardiac catheterization; 07/29/2021      0410 Patient verbalizes that he normally takes his labetalol 200 milligrams approximately at 7 or 8 a m       0411 Patient verbalizes decrease in pain symptoms status post medication delivery  4814 Patient with eyes closed verbalized decreased right radial pain at this time        8613 Blood Pressure(!): 174/72   0744 Pulse: 70   0744 SpO2: 95 %   0827 Ultrasound technician verbalized negative pseudoaneurysm and DVT results for patient                                              MDM  Number of Diagnoses or Management Options     Amount and/or Complexity of Data Reviewed  Clinical lab tests: ordered and reviewed  Tests in the radiology section of CPT®: ordered and reviewed  Review and summarize past medical records: yes    Risk of Complications, Morbidity, and/or Mortality  Presenting problems: moderate  Diagnostic procedures: moderate  Management options: low    Patient Progress  Patient progress: stable       Patient is a 47-year-old male with ESRD on dialysis 3 times weekly, diabetes mellitus, hypertension, stroke currently on 81 mg of aspirin daily, with recent right heart catheterization, left heart catheterization with ventriculography on 07/29/2021 with right brachial vein and right radial artery access the presents emergency department with swelling and right hand 4 days  Patient also verbalizes pain along radial artery insertion site beginning on 07/29/2021 since right radial artery access for heart catheterization  Patient hemodynamically stable and afebrile  Patient verbalizes hemodialysis scheduled at 10:30 a m  This morning  ECG with normal sinus rhythm; elevated troponin level; chronically elevated; recent cardiac catheterization-07/29/2021  Serum creatinine 10 63; BUN 42-normal electrolytes  No leukocytosis, no banding doubt infection; hemoglobin hematocrit 8 6/26 6 with recent hemoglobin hematocrit on 07/15/2021 9 6/29 7  Venous ultrasound and arterial ultrasound of right upper extremity  Delivered Dilaudid with patient verbalized decrease in right wrist pain symptoms status post medication  Right wrist x-ray with no acute osseous abnormality on initial read  VAS pseudoaneurysm study- and VAS upper limb venous duplex scan, unilateral with ultrasound technologist verbalizing no pseudoaneurysm and no DVT; very likely postoperative pain  Blood pressure improved status post pain medications; Dilaudid  Patient denies chest pain, shortness of breath, and abdominal pain throughout the entire ED visit  Will have patient discharged follow-up with Cardiology, PCP, emergency department if symptoms persist or exacerbate  Patient has 10:30 a m  Hemodialysis appointment  Patient with verbal understanding all clinical laboratory imaging findings, discharge instructions, follow-up, and verbalized agreement with patient current treatment plan      Disposition  Final diagnoses:   Postoperative pain   ESRD (end stage renal disease) on dialysis ([facility] )   Elevated serum creatinine     Time reflects when diagnosis was documented in both MDM as applicable and the Disposition within this note     Time User Action Codes Description Comment    8/2/2021  8:34 AM Madeleine Arnold Add [G89 18] Postoperative pain     8/2/2021  8:34 AM Ruthy Mccormack Everett Cheese [N18 6,  Z99 2] ESRD (end stage renal disease) on dialysis ([facility] [address] [address] )     8/2/2021  8:35 AM Joy Juarez Add [R79 [id number]] Elevated serum creatinine       ED Disposition     ED Disposition Condition Date/Time Comment    Discharge Stable Mon Aug 2, 2021  8:34 AM Lillian Mckeon discharge to home/self care  Follow-up Information     Follow up With Specialties Details Why Contact Info Additional Information    Celeste Huang DO Internal Medicine   [address]  [address] Floor, [facility] [address] Emergency Department Emergency Medicine   [address] [place] [address]  [phone] [facility] Emergency Department, [address], [place], [address] [place] [address] [facility]   [address] [place] [address]  [phone]             Patient's Medications   Discharge Prescriptions    No medications on file     No discharge procedures on file      PDMP Review       Value Time User    PDMP Reviewed  Yes 8/2/2021  2:34 AM Jamie Lubin MD          ED Provider  Electronically Signed by           Rodrick Moody PA-C  08/02/21 9163

## 2021-08-03 LAB
DME PARACHUTE DELIVERY DATE ACTUAL: NORMAL
DME PARACHUTE DELIVERY DATE REQUESTED: NORMAL
DME PARACHUTE ITEM DESCRIPTION: NORMAL
DME PARACHUTE ORDER STATUS: NORMAL
DME PARACHUTE SUPPLIER NAME: NORMAL
DME PARACHUTE SUPPLIER PHONE: NORMAL

## 2021-08-03 PROCEDURE — 93926 LOWER EXTREMITY STUDY: CPT | Performed by: SURGERY

## 2021-08-12 ENCOUNTER — OFFICE VISIT (OUTPATIENT)
Dept: INTERNAL MEDICINE CLINIC | Facility: CLINIC | Age: 36
End: 2021-08-12
Payer: MEDICARE

## 2021-08-12 VITALS
RESPIRATION RATE: 16 BRPM | HEART RATE: 87 BPM | HEIGHT: 71 IN | DIASTOLIC BLOOD PRESSURE: 82 MMHG | OXYGEN SATURATION: 99 % | SYSTOLIC BLOOD PRESSURE: 138 MMHG | TEMPERATURE: 99.2 F | BODY MASS INDEX: 29.88 KG/M2 | WEIGHT: 213.4 LBS

## 2021-08-12 DIAGNOSIS — I25.10 NONOBSTRUCTIVE ATHEROSCLEROSIS OF CORONARY ARTERY: ICD-10-CM

## 2021-08-12 DIAGNOSIS — M25.531 RIGHT WRIST PAIN: ICD-10-CM

## 2021-08-12 DIAGNOSIS — F41.1 GENERALIZED ANXIETY DISORDER: ICD-10-CM

## 2021-08-12 DIAGNOSIS — I15.0 RENOVASCULAR HYPERTENSION: Primary | ICD-10-CM

## 2021-08-12 PROBLEM — K52.9 CHRONIC DIARRHEA: Status: RESOLVED | Noted: 2020-10-20 | Resolved: 2021-08-12

## 2021-08-12 PROCEDURE — 99214 OFFICE O/P EST MOD 30 MIN: CPT | Performed by: INTERNAL MEDICINE

## 2021-08-12 RX ORDER — ESCITALOPRAM OXALATE 10 MG/1
TABLET ORAL
Qty: 27 TABLET | Refills: 0 | Status: SHIPPED | OUTPATIENT
Start: 2021-08-12 | End: 2021-09-07 | Stop reason: SDUPTHER

## 2021-08-12 NOTE — ASSESSMENT & PLAN NOTE
-noted rise in BP while post cardiac cath  -home BP are his normal range  -continue present regimen, he is followed by [facility]

## 2021-08-12 NOTE — PROGRESS NOTES
Assessment/Plan:    Problem List Items Addressed This Visit        Cardiovascular and Mediastinum    Renovascular hypertension - Primary     -noted rise in BP while post cardiac cath  -home BP are his normal range  -continue present regimen, he is followed by [facility]            Other    Generalized anxiety disorder     -with depression  -he self discontinued sertraline  -requests restarting escitalopram, will restart at 10mg daily with re-eval in two months         Relevant Medications    escitalopram (LEXAPRO) 10 mg tablet      Other Visit Diagnoses     Nonobstructive atherosclerosis of coronary artery        -noted mid RCA 40% stenosis on pretransplant cardiac cath  -pt asymptomatic  -continue ASA and statin      Right wrist pain        -post cardiac cath  -multifactorial from post cardiac cath and swelling   -responded to dilaudid and swelling resolved after dialysis          Subjective:      Patient ID: Riley Muhammad is a 38 y o  male  HPI  44yo male with DM1 with ESRD on transplant list at [facility], neuropathy, gastroparesis, renovascular HTN, thoracic arthritis, pulm nodules, MDD, anxiety, anemia, vitamin D def with h/o CVA here for ER follow up  Had preop risk stratification for transplant cardiac cath on 7/29/21  Access was obtained on R wrist   He was upset he had restrictions that he was not pleased with  His BP mazin  Home BP have been 140-150/    He had incredible pain on R hand with swelling several days later and presented to ED 8/2  He was given dilaudid with relief  Swelling decreased when he was dialized  He had pseudoaneurysm study that was negative and venous duplex was neg for DVT  Cardiac cath showed no significant CAD, mid RCA shows 40% stenosis  He denies further pain and swelling  Has chronic UE neuropathy  He was placed on sertraline 50mg in April but discontinued it in June  He confronted someone in his life and did not feel comfortable that he was not in control    He prefers to restart escitalopram that he was previously on  He denies panic attacks  The following portions of the patient's history were reviewed and updated as appropriate: allergies, current medications, past family history, past medical history, past social history, past surgical history and problem list       Current Outpatient Medications:     Admelog SoloStar 100 units/mL injection pen, Inject 6 Units under the skin 3 (three) times a day with meals , Disp: , Rfl:     aspirin (ECOTRIN LOW STRENGTH) 81 mg EC tablet, Take 81 mg by mouth daily, Disp: , Rfl:     atorvastatin (LIPITOR) 40 mg tablet, Take 1 tablet (40 mg total) by mouth every evening, Disp: 90 tablet, Rfl: 1    b complex vitamins capsule, Take 1 capsule by mouth daily before lunch , Disp: , Rfl:     calcium acetate (PHOSLO) capsule, TAKE 3 CAPSULES BY MOUTH WITH MEALS, Disp: , Rfl:     cholecalciferol (VITAMIN D3) 1,000 units tablet, Take 1,000 Units by mouth daily, Disp: , Rfl:     cloNIDine (CATAPRES-TTS-3) 0 3 mg/24 hr, 1 patch once a week , Disp: , Rfl:     doxazosin (CARDURA) 2 mg tablet, TAKE 2 TABLETS BY MOUTH IN THE MORNING AND 2 IN THE EVENING, Disp: 120 tablet, Rfl: 0    doxazosin (CARDURA) 8 MG tablet, Take 8 mg by mouth daily at bedtime, Disp: , Rfl:     famotidine (PEPCID) 40 MG tablet, Take 1 tablet (40 mg total) by mouth 2 (two) times a day (Patient taking differently: Take 40 mg by mouth daily ), Disp: 60 tablet, Rfl: 5    gabapentin (NEURONTIN) 100 mg capsule, Take 2 tablets at bedtime, Disp: 60 capsule, Rfl: 5    GLUCAGON EMERGENCY 1 MG injection, INJECT 1MG SUBCUTANEOUSLY AS NEEDED (AS DIRECTED)   MAY REPEAT DOSE EVERY 20 MINUTES AS NEEDED, Disp: , Rfl: 3    hydrALAZINE (APRESOLINE) 100 MG tablet, Take 0 5 tablets (50 mg total) by mouth 2 (two) times a day, Disp: 90 tablet, Rfl: 1    insulin glargine (Basaglar KwikPen) 100 units/mL injection pen, Inject 14 Units under the skin daily at bedtime, Disp: , Rfl:    insulin lispro (HumaLOG) 100 units/mL injection, Inject 4 Units under the skin 3 (three) times a day with meals, Disp: 10 mL, Rfl: 0    labetalol (NORMODYNE) 200 mg tablet, Take 2 tablets (400 mg total) by mouth 2 (two) times a day, Disp: 30 tablet, Rfl: 0    lisinopril (ZESTRIL) 20 mg tablet, Take 1 tablet (20 mg total) by mouth daily, Disp: 30 tablet, Rfl: 0    Lokelma 5 g PACK, see administration instructions Once daily on non dialysis days, Disp: , Rfl:     metolazone (ZAROXOLYN) 10 mg tablet, Take 10 mg by mouth daily, Disp: , Rfl:     minoxidil (LONITEN) 2 5 mg tablet, Take 2 5 mg by mouth 2 (two) times a day , Disp: , Rfl:     NIFEdipine ER (ADALAT CC) 60 MG 24 hr tablet, Take 1 tablet (60 mg total) by mouth 2 (two) times a day, Disp: 180 tablet, Rfl: 0    ondansetron (ZOFRAN) 4 mg tablet, Take 4 mg by mouth every 8 (eight) hours as needed for nausea or vomiting, Disp: , Rfl:     saccharomyces boulardii (FLORASTOR) 250 mg capsule, Take 1 capsule (250 mg total) by mouth 2 (two) times a day, Disp: 60 capsule, Rfl: 0    torsemide (DEMADEX) 100 mg tablet, Take 100 mg by mouth 2 (two) times a day , Disp: , Rfl:     B-D ULTRAFINE III SHORT PEN 31G X 8 MM MISC, USE 1 PEN NEEDLE 8 TIMES DAILY, Disp: 100 each, Rfl: 47    cinacalcet (SENSIPAR) 60 MG tablet, Take 60 mg by mouth daily Non-dialysis days, Disp: , Rfl:     escitalopram (LEXAPRO) 10 mg tablet, Take 0 5 tablets (5 mg total) by mouth daily for 7 days, THEN 1 tablet (10 mg total) daily for 23 days  , Disp: 27 tablet, Rfl: 0    HYDROXYZINE HCL PO, Take by mouth (Patient not taking: Reported on 7/1/2021), Disp: , Rfl:     loperamide (IMODIUM) 2 mg capsule, Take 1 capsule (2 mg total) by mouth 3 (three) times a day as needed for diarrhea (Patient not taking: Reported on 7/1/2021), Disp: 30 capsule, Rfl: 0    Metoclopramide HCl (REGLAN PO), Take by mouth Unknown dose (Patient not taking: Reported on 7/1/2021), Disp: , Rfl:     sevelamer (RENAGEL) 800 mg tablet, Take 3 tablets (2,400 mg total) by mouth 3 (three) times a day with meals (Patient not taking: Reported on 5/3/2021), Disp: 270 tablet, Rfl: 0    sevelamer carbonate (Renvela) 800 mg tablet, 3 (three) times a day with meals  (Patient not taking: Reported on 7/1/2021), Disp: , Rfl:     Review of Systems   Constitutional: Negative for activity change, fever and unexpected weight change  Respiratory: Negative for cough, chest tightness, shortness of breath and wheezing  Cardiovascular: Negative for chest pain, palpitations and leg swelling  Gastrointestinal: Negative for abdominal pain, diarrhea, nausea and vomiting  Musculoskeletal: Positive for arthralgias  Neurological: Positive for dizziness and numbness  Negative for seizures and weakness  Psychiatric/Behavioral: Positive for dysphoric mood  The patient is nervous/anxious  Objective:    /82 (BP Location: Right arm, Patient Position: Sitting)   Pulse 87   Temp 99 2 °F (37 3 °C)   Resp 16   Ht 5' 11" (1 803 m)   Wt 96 8 kg (213 lb 6 4 oz)   SpO2 99%   BMI 29 76 kg/m²      Physical Exam  Vitals reviewed  Constitutional:       General: He is not in acute distress  Appearance: Normal appearance  He is not diaphoretic  Cardiovascular:      Rate and Rhythm: Normal rate and regular rhythm  Pulses: Normal pulses  Heart sounds: Normal heart sounds  Pulmonary:      Effort: Pulmonary effort is normal  No respiratory distress  Breath sounds: Normal breath sounds  No wheezing  Musculoskeletal:      Right wrist: No swelling or tenderness  Normal range of motion  Comments:  strength 5/5 and equal   Skin:     Coloration: Skin is not jaundiced or pale  Neurological:      Mental Status: He is alert and oriented to person, place, and time  Psychiatric:         Attention and Perception: Attention normal          Mood and Affect: Affect normal  Mood is anxious           Speech: Speech normal  Behavior: Behavior is cooperative

## 2021-08-12 NOTE — ASSESSMENT & PLAN NOTE
-with depression  -he self discontinued sertraline  -requests restarting escitalopram, will restart at 10mg daily with re-eval in two months

## 2021-08-16 ENCOUNTER — OFFICE VISIT (OUTPATIENT)
Dept: CARDIOLOGY CLINIC | Facility: CLINIC | Age: 36
End: 2021-08-16
Payer: MEDICARE

## 2021-08-16 VITALS
HEART RATE: 69 BPM | DIASTOLIC BLOOD PRESSURE: 58 MMHG | HEIGHT: 71 IN | OXYGEN SATURATION: 99 % | SYSTOLIC BLOOD PRESSURE: 124 MMHG | BODY MASS INDEX: 30.62 KG/M2 | WEIGHT: 218.7 LBS

## 2021-08-16 DIAGNOSIS — I10 HYPERTENSION, UNSPECIFIED TYPE: Primary | ICD-10-CM

## 2021-08-16 DIAGNOSIS — I25.10 CORONARY ARTERY DISEASE INVOLVING NATIVE CORONARY ARTERY OF NATIVE HEART WITHOUT ANGINA PECTORIS: ICD-10-CM

## 2021-08-16 DIAGNOSIS — N18.6 ESRD (END STAGE RENAL DISEASE) (HCC): ICD-10-CM

## 2021-08-16 PROCEDURE — 99214 OFFICE O/P EST MOD 30 MIN: CPT | Performed by: NURSE PRACTITIONER

## 2021-08-16 NOTE — PROGRESS NOTES
Cardiology Follow Up    Sourav Ferny  1985  [id number]  [facility]  [address] [address] [phone]    1  Hypertension, unspecified type     2  ESRD (end stage renal disease) ([facility] )     3  Coronary artery disease involving native coronary artery of native heart without angina pectoris         Interval History:   Mr Corbin Long is undergoing kidney transplant evaluation at [facility]   On 7/29/21 LHC/RHC via right brachial and Right radial showed Mildly to moderatley elevated LVEDS,  Mod PHTN  No significant coronary artery disease  Mid RCA 40% stenosis  On 8/02/21 Mr Corbin Long presnted to the ED with swelling of the right hand for 4 days  /87  Blood pressure improved after treatment with pain medication  He was discharged home  Mr Adam Mercado presents to our office for a recent LHC/RHC follow up visit  He is accompanied by his mother  Opal Willingham  Denies chest pain palpitations lightheadedness dizziness,  Dyspnea with minimal moderate exertion  Edema resolved post HD and pain and right hand resolved  Opal Willingham and his mother express the disappointment that he was not prepared by the cardiology team on what to expect post Cardiac catheterization in regards to limitations          HPI:  ESRD on HD   DM2  Hypertension  CVA  Anemia of CKD   Patient Active Problem List   Diagnosis    Type 1 diabetes mellitus ([facility] )    History of lacunar cerebrovascular accident (CVA)    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,      Binocular visual disturbance    Hyperphosphatemia    Vitamin D deficiency    Homozygous MTHFR mutation [id number]    Anemia in chronic kidney disease, on chronic dialysis (HCC)    Persistent proteinuria    Ataxia    Esophagitis    Left atrial dilation    Renovascular hypertension    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 ([facility] )    Dyslipidemia  Strabismus    Diarrhea    Environmental and seasonal allergies    Pruritus    Obesity (BMI 30 0-34  9)    Gastroparesis diabeticorum (HCC)    Peripheral polyneuropathy    Incidental lung nodule, > 3mm and < 8mm    Vertigo    Impaired mobility and ADLs    Diabetic neuropathy (HCC)    Provoked seizure (HCC)    Asterixis    Foot drop, bilateral    ESRD (end stage renal disease) (HCC)    Secondary hyperparathyroidism (HCC)    Emesis    Orthostatic hypotension    Eosinophilic leukocytosis    Protein-calorie malnutrition (HCC)    End stage renal disease on dialysis due to type 1 diabetes mellitus (HCC)    Tubulovillous adenoma of colon    Gastroesophageal reflux disease without esophagitis    Medical cannabis use    Numbness of arm    Moderate episode of recurrent major depressive disorder ([address] )    Generalized anxiety disorder     Past Medical History:   Diagnosis Date    Acute kidney injury ([address] )     Ambulates with cane     Anuria     Anxiety     Cellulitis of right elbow 3/31/2021    Chronic kidney disease     Depression     Diabetes mellitus (HCC)     Diarrhea     Falls     Gastroparesis     GERD (gastroesophageal reflux disease)     History of shingles 2010    History of transfusion 02/2018    no adverse reaction    Hyperlipidemia     Hyperphosphatemia     Hypertension     Itching     Muscle weakness     general unsteadiness    PONV (postoperative nausea and vomiting) 1/26/2018    Recurrent peritonitis ([address] ) due to peritoneal dialysis catheter 7/31/2020    Retinopathy     Seizures ([address] )     early 2020 - one time    Skin abnormality     some dime size areas where skin was scratched from itching    Spontaneous bacterial peritonitis ([address] ) 10/19/2020    Stroke ([address] )     x2 - off balance/no driving/fatigue    Swelling of both lower extremities     Vomiting     Wears glasses      Social History     Socioeconomic History    Marital status: Single     Spouse name: Not on file    Number of children: Not on file    Years of education: Not on file    Highest education level: Not on file   Occupational History    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   Tobacco Use    Smoking status: Former Smoker     Packs/day: 0 50     Years: 12 00     Pack years: 6 00     Types: Cigarettes     Quit date: 02/2018     Years since quitting: 3 5    Smokeless tobacco: Never Used    Tobacco comment: quit 2/2018   Vaping Use    Vaping Use: Every day    Substances: THC, CBD   Substance and Sexual Activity    Alcohol use: Not Currently    Drug use: Yes     Types: Marijuana     Comment: medical marijuana    Sexual activity: Not on file   Other Topics Concern    Not on file   Social History Narrative    Caffeine use    single     Social Determinants of Health     Financial Resource Strain:     Difficulty of Paying Living Expenses:    Food Insecurity:     Worried About Running Out of Food in the Last Year:     [address] in the Last Year:    Transportation Needs: No Transportation Needs    Lack of Transportation (Medical): No    Lack of Transportation (Non-Medical):  No   Physical Activity:     Days of Exercise per Week:     Minutes of Exercise per Session:    Stress:     Feeling of Stress :    Social Connections:     Frequency of Communication with Friends and Family:     Frequency of Social Gatherings with Friends and Family:     Attends Hoahaoism Services:     Active Member of Clubs or Organizations:     Attends Club or Organization Meetings:     Marital Status:    Intimate Partner Violence:     Fear of Current or Ex-Partner:     Emotionally Abused:     Physically Abused:     Sexually Abused:       Family History   Problem Relation Age of Onset    Breast cancer Mother     Hypertension Mother     Hyperlipidemia Father     Hypertension Father     Leukemia Maternal Grandmother     Hyperlipidemia Maternal Grandfather     Hypertension Maternal Grandfather     Hyperlipidemia Paternal Grandmother     Hypertension Paternal Grandmother     Heart disease Paternal Grandfather         cardiac disorder    Diabetes Paternal Grandfather      Past Surgical History:   Procedure Laterality Date    CARDIAC LOOP RECORDER  05/2018    COLONOSCOPY      EGD      EYE SURGERY Right     IR AV FISTULAGRAM/GRAFTOGRAM  2/23/2021    IR TUNNELED CENTRAL LINE PLACEMENT  2/16/2021    IR TUNNELED DIALYSIS CATHETER PLACEMENT  11/18/2020    IR TUNNELED DIALYSIS CATHETER REMOVAL  2/12/2021    IR TUNNELED DIALYSIS CATHETER REMOVAL  3/11/2021    PERITONEAL CATHETER INSERTION N/A 8/27/2018    Procedure: UNROOF PD CATHETER;  Surgeon: Gabriel Holloway DO;  [facility];  Service: General    TX ANASTOMOSIS,AV,ANY SITE Left 11/9/2020    Procedure: CREATION FISTULA  ARTERIOVENOUS (AV) - LEFT WRIST;  Surgeon: Anabell Bauer MD;  [facility];  Service: Vascular    TX ESOPHAGOGASTRODUODENOSCOPY TRANSORAL DIAGNOSTIC N/A 4/18/2019    Procedure: ESOPHAGOGASTRODUODENOSCOPY (EGD); Surgeon: Preston Abarca MD;  Location: [facility];   Service: Gastroenterology    TX LAP INSERTION TUNNELED INTRAPERITONEAL CATHETER N/A 8/6/2018    Procedure: LAPAROSCOPIC PD CATHETER PLACEMENT;  Surgeon: Gabriel Holloway DO;  [facility];  Service: General    TX REMOVAL TUNNELED INTRAPERITONEAL CATHETER N/A 11/18/2020    Procedure: REMOVAL CATHETER PERITONEAL DIALYSIS;  Surgeon: Cristobal Thomas MD;  [facility];  Service: General    TONSILLECTOMY      UPPER GASTROINTESTINAL ENDOSCOPY         Current Outpatient Medications:     Admelog SoloStar 100 units/mL injection pen, Inject 6 Units under the skin 3 (three) times a day with meals , Disp: , Rfl:     aspirin (ECOTRIN LOW STRENGTH) 81 mg EC tablet, Take 81 mg by mouth daily, Disp: , Rfl:     atorvastatin (LIPITOR) 40 mg tablet, Take 1 tablet (40 mg total) by mouth every evening, Disp: 90 tablet, Rfl: 1    b complex vitamins capsule, Take 1 capsule by mouth daily before lunch , Disp: , Rfl:     B-D ULTRAFINE III SHORT PEN 31G X 8 MM MISC, USE 1 PEN NEEDLE 8 TIMES DAILY, Disp: 100 each, Rfl: 47    calcium acetate (PHOSLO) capsule, TAKE 3 CAPSULES BY MOUTH WITH MEALS, Disp: , Rfl:     cholecalciferol (VITAMIN D3) 1,000 units tablet, Take 1,000 Units by mouth daily, Disp: , Rfl:     cinacalcet (SENSIPAR) 60 MG tablet, Take 60 mg by mouth daily Non-dialysis days, Disp: , Rfl:     cloNIDine (CATAPRES-TTS-3) 0 3 mg/24 hr, 1 patch once a week , Disp: , Rfl:     doxazosin (CARDURA) 2 mg tablet, TAKE 2 TABLETS BY MOUTH IN THE MORNING AND 2 IN THE EVENING, Disp: 120 tablet, Rfl: 0    doxazosin (CARDURA) 8 MG tablet, Take 8 mg by mouth daily at bedtime, Disp: , Rfl:     escitalopram (LEXAPRO) 10 mg tablet, Take 0 5 tablets (5 mg total) by mouth daily for 7 days, THEN 1 tablet (10 mg total) daily for 23 days  , Disp: 27 tablet, Rfl: 0    famotidine (PEPCID) 40 MG tablet, Take 1 tablet (40 mg total) by mouth 2 (two) times a day (Patient taking differently: Take 40 mg by mouth daily ), Disp: 60 tablet, Rfl: 5    gabapentin (NEURONTIN) 100 mg capsule, Take 2 tablets at bedtime, Disp: 60 capsule, Rfl: 5    GLUCAGON EMERGENCY 1 MG injection, INJECT 1MG SUBCUTANEOUSLY AS NEEDED (AS DIRECTED)   MAY REPEAT DOSE EVERY 20 MINUTES AS NEEDED, Disp: , Rfl: 3    hydrALAZINE (APRESOLINE) 100 MG tablet, Take 0 5 tablets (50 mg total) by mouth 2 (two) times a day, Disp: 90 tablet, Rfl: 1    insulin glargine (Basaglar KwikPen) 100 units/mL injection pen, Inject 14 Units under the skin daily at bedtime, Disp: , Rfl:     insulin lispro (HumaLOG) 100 units/mL injection, Inject 4 Units under the skin 3 (three) times a day with meals, Disp: 10 mL, Rfl: 0    labetalol (NORMODYNE) 200 mg tablet, Take 2 tablets (400 mg total) by mouth 2 (two) times a day, Disp: 30 tablet, Rfl: 0    lisinopril (ZESTRIL) 20 mg tablet, Take 1 tablet (20 mg total) by mouth daily, Disp: 30 tablet, Rfl: 0    Lokelma 5 g PACK, see administration instructions Once daily on non dialysis days, Disp: , Rfl:     loperamide (IMODIUM) 2 mg capsule, Take 1 capsule (2 mg total) by mouth 3 (three) times a day as needed for diarrhea (Patient not taking: Reported on 7/1/2021), Disp: 30 capsule, Rfl: 0    Metoclopramide HCl (REGLAN PO), Take by mouth Unknown dose (Patient not taking: Reported on 7/1/2021), Disp: , Rfl:     metolazone (ZAROXOLYN) 10 mg tablet, Take 10 mg by mouth daily, Disp: , Rfl:     minoxidil (LONITEN) 2 5 mg tablet, Take 2 5 mg by mouth 2 (two) times a day , Disp: , Rfl:     NIFEdipine ER (ADALAT CC) 60 MG 24 hr tablet, Take 1 tablet (60 mg total) by mouth 2 (two) times a day, Disp: 180 tablet, Rfl: 0    ondansetron (ZOFRAN) 4 mg tablet, Take 4 mg by mouth every 8 (eight) hours as needed for nausea or vomiting, Disp: , Rfl:     saccharomyces boulardii (FLORASTOR) 250 mg capsule, Take 1 capsule (250 mg total) by mouth 2 (two) times a day, Disp: 60 capsule, Rfl: 0    sevelamer (RENAGEL) 800 mg tablet, Take 3 tablets (2,400 mg total) by mouth 3 (three) times a day with meals (Patient not taking: Reported on 5/3/2021), Disp: 270 tablet, Rfl: 0    sevelamer carbonate (Renvela) 800 mg tablet, 3 (three) times a day with meals  (Patient not taking: Reported on 7/1/2021), Disp: , Rfl:     torsemide (DEMADEX) 100 mg tablet, Take 100 mg by mouth 2 (two) times a day , Disp: , Rfl:   Allergies   Allergen Reactions    Sulfa Antibiotics Rash       Labs:  Admission on 08/02/2021, Discharged on 08/02/2021   Component Date Value    WBC 08/02/2021 5 65     RBC 08/02/2021 2 67*    Hemoglobin 08/02/2021 8 6*    Hematocrit 08/02/2021 26 6*    MCV 08/02/2021 100*    MCH 08/02/2021 32 2     MCHC 08/02/2021 32 3     RDW 08/02/2021 15 0     MPV 08/02/2021 9 8     Platelets 56/06/6771 208     nRBC 08/02/2021 0     Neutrophils Relative 08/02/2021 58     Immat GRANS % 08/02/2021 0     Lymphocytes Relative 08/02/2021 20     Monocytes Relative 08/02/2021 8     Eosinophils Relative 08/02/2021 13*    Basophils Relative 08/02/2021 1     Neutrophils Absolute 08/02/2021 3 26     Immature Grans Absolute 08/02/2021 0 02     Lymphocytes Absolute 08/02/2021 1 11     Monocytes Absolute 08/02/2021 0 45     Eosinophils Absolute 08/02/2021 0 75*    Basophils Absolute 08/02/2021 0 06     Protime 08/02/2021 14 7*    INR 08/02/2021 1 14     PTT 08/02/2021 34     Sodium 08/02/2021 142     Potassium 08/02/2021 4 9     Chloride 08/02/2021 102     CO2 08/02/2021 26     ANION GAP 08/02/2021 14*    BUN 08/02/2021 42*    Creatinine 08/02/2021 10 63*    Glucose 08/02/2021 91     Calcium 08/02/2021 8 4     AST 08/02/2021 15     ALT 08/02/2021 16     Alkaline Phosphatase 08/02/2021 92     Total Protein 08/02/2021 6 2*    Albumin 08/02/2021 3 5     Total Bilirubin 08/02/2021 0 62     eGFR 08/02/2021 5     Magnesium 08/02/2021 2 6     Troponin I 08/02/2021 0 06*    Ventricular Rate 08/02/2021 72     Atrial Rate 08/02/2021 72     CO Interval 08/02/2021 172     QRSD Interval 08/02/2021 72     QT Interval 08/02/2021 424     QTC Interval 08/02/2021 464     P Axis 08/02/2021 60     QRS Axis 08/02/2021 97     T Wave [place] 08/02/2021 18    Admission on 07/29/2021, Discharged on 07/29/2021   Component Date Value    Sodium 07/29/2021 138     Potassium 07/29/2021 4 8     Chloride 07/29/2021 102     CO2 07/29/2021 31     ANION GAP 07/29/2021 5     BUN 07/29/2021 23     Creatinine 07/29/2021 6 82*    Glucose 07/29/2021 179*    Glucose, Fasting 07/29/2021 179*    Calcium 07/29/2021 8 6     eGFR 07/29/2021 9     Ventricular Rate 07/29/2021 75     Atrial Rate 07/29/2021 75     CO Interval 07/29/2021 182     QRSD Interval 07/29/2021 74     QT Interval 07/29/2021 424     QTC Interval 07/29/2021 473     P Axis 07/29/2021 43     QRS Axis 07/29/2021 61     T Wave Axis 07/29/2021 Aureliano Name 07/29/2021 [facility]     Supplier Phone Number 07/29/2021 [phone]     Order Status 07/29/2021 Delivery Successful     Delivery Request Date 07/29/2021 07/29/2021     Date Delivered  07/29/2021 07/31/2021     Item Description 07/29/2021 Walker Platform Attachment, Right     POC Glucose 07/29/2021 149*   Telephone on 07/02/2021   Component Date Value    Hemoglobin A1C 06/08/2021 6 3    Telephone on 06/25/2021   Component Date Value    WBC 07/13/2021 6 28     RBC 07/13/2021 3 00*    Hemoglobin 07/13/2021 9 6*    Hematocrit 07/13/2021 29 7*    MCV 07/13/2021 99*    MCH 07/13/2021 32 0     MCHC 07/13/2021 32 3     RDW 07/13/2021 14 4     MPV 07/13/2021 9 6     Platelets 00/49/4051 288     nRBC 07/13/2021 0     Neutrophils Relative 07/13/2021 59     Immat GRANS % 07/13/2021 1     Lymphocytes Relative 07/13/2021 20     Monocytes Relative 07/13/2021 7     Eosinophils Relative 07/13/2021 12*    Basophils Relative 07/13/2021 1     Neutrophils Absolute 07/13/2021 3 73     Immature Grans Absolute 07/13/2021 0 04     Lymphocytes Absolute 07/13/2021 1 25     Monocytes Absolute 07/13/2021 0 43     Eosinophils Absolute 07/13/2021 0 76*    Basophils Absolute 07/13/2021 0 07     Sodium 07/13/2021 143     Potassium 07/13/2021 4 9     Chloride 07/13/2021 104     CO2 07/13/2021 29     ANION GAP 07/13/2021 10     BUN 07/13/2021 27*    Creatinine 07/13/2021 7 69*    Glucose 07/13/2021 187*    Calcium 07/13/2021 8 7     Corrected Calcium 07/13/2021 9 3     AST 07/13/2021 18     ALT 07/13/2021 26     Alkaline Phosphatase 07/13/2021 96     Total Protein 07/13/2021 6 4     Albumin 07/13/2021 3 2*    Total Bilirubin 07/13/2021 0 56     eGFR 07/13/2021 8      Imaging: XR wrist 3+ views RIGHT    Result Date: 8/2/2021  Narrative: RIGHT WRIST INDICATION:   History of right heart cardiac catheterization; recent with right radial artery access; pain proximal to right radial artery  COMPARISON:  None VIEWS:  XR WRIST 3+ VW RIGHT FINDINGS: There is no acute fracture or dislocation  No significant degenerative changes  No lytic or blastic osseous lesion  There are atherosclerotic calcifications  Soft tissues are otherwise unremarkable  Impression: Severe atherosclerotic disease  Workstation performed: [id number]     VAS pseudoaneurysm study    Result Date: 8/3/2021  Narrative:  [facility] REPORT CLINICAL: Indications: The patient is experiencing pain and swelling in the right distal wrist at the radial access site for cardiac catheterization a couple days ago  Operative History: Left AVF Risk Factors The patient has history of Obesity, HTN, Diabetes (Yes), CKD and previous smoking (quit 1-5yrs ago)  FINDINGS:  Right                PSV  EDV  Mid Brachial         124   13  Mid Radial           100   13  Dist  Radial Artery  124   14     CONCLUSION: Impression No evidence of a pseudoaneurysm distal radial artery  The distal radial vein is patent with no evidence of an arteriovenous fistula noted  SIGNATURE: Electronically Signed by: Julissa Bansal MD, RPVI on 2021-08-03 12:55:09 PM    VAS upper limb venous duplex scan, unilateral/limited    Result Date: 8/2/2021  Narrative:  [facility] REPORT CLINICAL: Indications:  Right Swelling [M79 89]  Patient presents with upper extremity pain and swelling over site where he underwent right radial access for cardiac catheterization a few days ago  Operative History: Left AVF Risk Factors The patient has history of Obesity, HTN, Diabetes (IDDM), CKD and previous smoking (quit 1-5yrs ago)  He has no history of DVT  FINDINGS:  Segment       Right            Left                        Impression       Impression       Int  Jugular  Normal (Patent)  Normal (Patent)     CONCLUSION: Impression RIGHT UPPER LIMB: No evidence of acute or chronic deep vein thrombosis   No evidence of superficial thrombophlebitis noted  Doppler evaluation shows a normal response to augmentation maneuvers  LEFT UPPER LIMB LIMITED: Evaluation shows no evidence of thrombus in the internal jugular vein, subclavian vein, and the brachiocephalic vein  SIGNATURE: Electronically Signed by: Perla Jackson on 2021 05:19:52 PM    Cardiac rhc/lhc    Result Date: 2021  Narrative: [address] [phone] [facility] Invasive Cardiovascular Lab Complete Report Patient: Iris Bartholomew MR number: [id number] Account number: [de-identified] Study date: 2021 Gender: Male : 1985 Height: 70 9 in Weight: 212 7 lb BSA: 2 16 mï¾² Allergies: SULFA ANTIBIOTICS Diagnostic Cardiologist:  Eva Dobbins MD Primary Physician:  Joselin Christine DO SUMMARY CARDIAC STRUCTURES: Global left ventricular function was normal  HEMODYNAMICS: Hemodynamic assessment demonstrated mildly to moderately elevated LVEDP  There was moderate pulmonary hypertension, probably due to left ventricular diastolic dysfunction  PROCEDURES PERFORMED --  Right heart catheterization  --  Left heart catheterization with ventriculography  --  Left coronary angiography  --  Right coronary angiography  --  Outpatient  --  Cardiac output/Dee method  --  Mod Sedation Same Physician Initial 15min  --  Mod Sedation Same Physician Add 15min  --  Coronary Catheterization (w/ LHC)  PROCEDURE: The risks and alternatives of the procedures and conscious sedation were explained to the patient and informed consent was obtained  The patient was brought to the cath lab and placed on the table  The planned puncture sites were prepped and draped in the usual sterile fashion  --  Right brachial vein access  The puncture site was infiltrated with local anesthetic   The vessel was accessed using the modified Seldinger technique, a wire was advanced into the vessel, and a sheath was advanced over the wire into the vessel  --  Right radial artery access  After performing an David's test to verify adequate ulnar artery supply to the hand, the radial site was prepped  The puncture site was infiltrated with local anesthetic  The vessel was accessed using the modified Seldinger technique, a wire was advanced into the vessel, and a sheath was advanced over the wire into the vessel  --  Right heart catheterization  A [employer] catheter was advanced under fluoroscopic guidance into the right heart and intracardiac pressures were obtained  --  Left heart catheterization with ventriculography  A catheter was advanced over a guidewire into the ascending aorta  After recording ascending aortic pressure, the catheter was advanced across the aortic valve and left ventricular pressure was recorded  Ventriculography was performed  The catheter was pulled back across the aortic valve and into the ascending aorta and pullback pressures were obtained  --  Left coronary artery angiography  A catheter was advanced over a guidewire into the aorta and positioned in the left coronary artery ostium under fluoroscopic guidance  Angiography was performed  --  Right coronary artery angiography  A catheter was advanced over a guidewire into the aorta and positioned in the right coronary artery ostium under fluoroscopic guidance  Angiography was performed  --  Outpatient  --  Cardiac output/Dee method  Blood samples were obtained and measurements of arterial and venous oxygen saturations were made and cardiac output measurements were obtained using the Dee equation  --  Mod Sedation Same Physician Initial 15min  --  Mod Sedation Same Physician Add 15min  --  Coronary Catheterization (w/ [facility])  PROCEDURE COMPLETION: The patient tolerated the procedure well and was discharged from the cath lab  TIMING: Test started at 07:56  Test concluded at 08:43  HEMOSTASIS: The sheath was removed  The site was compressed with a Hemoband device  Hemostasis was obtained  The sheath was removed  The site was compressed manually  Hemostasis was obtained  MEDICATIONS GIVEN: 1% Lidocaine, 1 ml, subcutaneously, at 08:08  Fentanyl (1OOmcg/2 ml), 50 mcg, IV, at 08:09  Versed (2mg/2ml), 1 mg, IV, at 08:09  Versed (2mg/2ml), 1 mg, IV, at 08:17  Fentanyl (1OOmcg/2 ml), 50 mcg, IV, at 08:17  1% Lidocaine, 2 ml, subcutaneously, at 08:20  Nitroglycerin (200mcg/ml), 200 mcg, at 08:22  Verapamil (5mg/2ml), 2 5 mg, IV, at 08:22  Heparin 1000 units/ml, 4,000 units, IV, at 08:22  Fentanyl (1OOmcg/2 ml), 50 mcg, IV, at 08:22  CONTRAST GIVEN: 80 ml Omnipaque (350mg I /ml)  36 ml Omnipaque (350mg I/ml)  RADIATION EXPOSURE: Fluoroscopy time: 12 2 min  HEMODYNAMICS: Hemodynamic assessment demonstrated mildly to moderately elevated LVEDP  There was moderate pulmonary hypertension, probably due to left ventricular diastolic dysfunction  VENTRICLES:   --  Global left ventricular function was normal  VALVES: AORTIC VALVE:   --  There was no aortic stenosis  MITRAL VALVE:   --  The mitral valve exhibited no regurgitation  CORONARY VESSELS:   --  The coronary circulation is right dominant  --  Left main: The vessel was medium to large sized and not calcified  Angiography showed mild atherosclerosis  --  Circumflex: The vessel was medium sized  Angiography showed minor luminal irregularities  --  Mid RCA: There was a 40 % stenosis  IMPRESSIONS: There is no significant coronary artery disease  DISPOSITION: The patient left the catheterization laboratory in stable condition  Prepared and signed by Andreea Atkins MD Signed 07/29/2021 08:50:08 Study diagram Angiographic findings Native coronary lesions: ï¾·Mid RCA: Lesion 1: 40 % stenosis   Hemodynamic tables Pressures:  Baseline Pressures:  - HR: 77 Pressures:  - Rhythm: Pressures:  -- Aortic Pressure (S/D/M): 121/64/91 Pressures:  -- Left Ventricle (s/edp): 125/30/-- Pressures:  -- Pulmonary Artery (S/D/M): 52/23/40 Pressures:  -- Pulmonary Capillary Wedge: 31/43/26 Pressures:  -- Right Atrium (a/v/M): / Pressures:  -- Right Ventricle (s/edp): 59/22/-- O2 Sats:  Baseline O2 Sats:  - HR: 77 O2 Sats:  - Rhythm: O2 Sats:  -- AO: 9  25 O2 Sats:  -- Pa:  99 Outputs:  Baseline Outputs:  -- CALCULATIONS: [age] Outputs:  -- CALCULATIONS: Average Partial Oxygen - PA: 9 20 Outputs:  -- CALCULATIONS: Average Partial Oxygen - SA: 12 90 Outputs:  -- CALCULATIONS: Body Surface Area: 2 16 Outputs:  -- CALCULATIONS: Height in cm: 180 00 Outputs:  -- CALCULATIONS: Sex: Male Outputs:  -- CALCULATIONS: Weight in k 70 Outputs:  -- OUTPUTS: CO by Dee: 8 32 Outputs:  -- OUTPUTS: Dee cardiac index: 3 84 Outputs:  -- OUTPUTS: Dee HR: 77 00 Outputs:  -- OUTPUTS: O2 consumption: 271 44 Outputs:  -- OUTPUTS: Vo2 Indexed: 125 44 Outputs:  -- RESISTANCES: Left ventricular stroke work: 94 25 Outputs:  -- RESISTANCES: Left Ventricular Stroke Work index: 43 56 Outputs:  -- RESISTANCES: Pulmonary vascular index (dsc): 291 36 Outputs:  -- RESISTANCES: Pulmonary vascular index (Wood Units): 3 64 Outputs:  -- RESISTANCES: Pulmonary vascular resistance (dsc): 134 64 Outputs:  -- RESISTANCES: Pulmonary vascular resistance (Wood Units): 1 68 Outputs:  -- RESISTANCES: PVR_SVR Ratio: 0 19 Outputs:  -- RESISTANCES: Right ventricular stroke work: 31 90 Outputs:  -- RESISTANCES: Right ventricular stroke work index: 14 74 Outputs:  -- RESISTANCES: Systemic vascular index (dsc): 1519 21 Outputs:  -- RESISTANCES: Systemic vascular index (Wood Units): 18 99 Outputs:  -- RESISTANCES: Systemic vascular resistance (dsc): 702 06 Outputs:  -- RESISTANCES: Systemic vascular resistance (Wood Units): 8 78 Outputs:  -- RESISTANCES: Total pulmonary index (dsc): 832 44 Outputs:  -- RESISTANCES: Total pulmonary index (Wood Units): 10 41 Outputs:  -- RESISTANCES: Total pulmonary resistance (dsc): 384 69 Outputs:  -- RESISTANCES: Total pulmonary resistance (Wood Units): 4 81 Outputs:  -- RESISTANCES: Total vascular index (Wood Units): 23 68 Outputs:  -- RESISTANCES: Total vascular resistance (dsc): 875 17 Outputs:  -- RESISTANCES: Total vascular resistance (Wood Units): 10 94 Outputs:  -- RESISTANCES: Total vascular resistance index (dsc): 1893 81 Outputs:  -- RESISTANCES: TPR_TVR Ratio: 0 44 Outputs:  -- SHUNTS: Pulmonary flow: 8 32 Outputs:  -- SHUNTS: Qp Indexed: 3 84 Outputs:  -- SHUNTS: Qs Indexed: 3 84 Outputs:  -- SHUNTS: Systemic flow: 8 32       Review of Systems:  Review of Systems   Musculoskeletal: Positive for gait problem  Using a cane to assist with ambulation    All other systems reviewed and are negative  Physical Exam:  Physical Exam  Constitutional:       Appearance: Normal appearance  HENT:      Head: Normocephalic  Cardiovascular:      Rate and Rhythm: Normal rate and regular rhythm  Pulses: Normal pulses  Heart sounds: Normal heart sounds  Pulmonary:      Effort: Pulmonary effort is normal       Breath sounds: Normal breath sounds  Abdominal:      General: Bowel sounds are normal       Palpations: Abdomen is soft  Musculoskeletal:      Cervical back: Normal range of motion  Right lower leg: No edema  Left lower leg: No edema  Skin:     General: Skin is warm and dry  Capillary Refill: Capillary refill takes less than 2 seconds  Neurological:      Mental Status: He is alert and oriented to person, place, and time  Psychiatric:         Mood and Affect: Mood normal          Behavior: Behavior normal          Discussion/Summary:  1  Hypertension RUE sitting 120/60 controlled on ADALAT CC 60mg BID,  Lisinopril 20 mg daily, Cardura 2 mg daily, labetalol 400 mg t i d ,  Clonidine 0 3 mg Q 24 hours 1 patch weekly,  Hydralazine 50 mg b i d   2  ESRD on HD undergoing renal transplant work up at [facility] controlling volume status   [facility] ,  Continues on Zaroxolyn 5 mg daily, torsemide 100 mg b i d   3  Non obstructive CAD- Sp LHC showed mid RCA 40% stenosis, mildly elevated LVEDP

## 2021-08-16 NOTE — PATIENT INSTRUCTIONS
2gm sodium low fat low cholesterol, low carbohydrate, no concentrated sweets renal diet, eating fresh is best

## 2021-08-25 ENCOUNTER — SOCIAL WORK (OUTPATIENT)
Dept: BEHAVIORAL/MENTAL HEALTH CLINIC | Facility: CLINIC | Age: 36
End: 2021-08-25
Payer: MEDICARE

## 2021-08-25 DIAGNOSIS — F41.1 GAD (GENERALIZED ANXIETY DISORDER): ICD-10-CM

## 2021-08-25 DIAGNOSIS — G47.00 INSOMNIA, UNSPECIFIED TYPE: ICD-10-CM

## 2021-08-25 DIAGNOSIS — F32.A DEPRESSIVE DISORDER: Primary | ICD-10-CM

## 2021-08-25 PROCEDURE — 90834 PSYTX W PT 45 MINUTES: CPT | Performed by: SOCIAL WORKER

## 2021-08-25 NOTE — PSYCH
Psychotherapy Provided: Individual Psychotherapy 45 minutes     Length of time in session: 45 minutes, follow up in 2 week    Depressive disorder unspecified, medical marijuana use    Goals addressed in session: Goal 1     Pain:      moderate to severe    4    Current suicide risk : Low     Data: Today's session focused on TIFFANI'S FIRST GOAL that of reducing and helping him to manage his depression ad his anxiety especially round his serious health concerns  He was quite agitated and upset how [facility] removed him from the transplant program list  He is depressed over the fact he in his words he reached out for help and he feels it was used against him  Assessment: Yohana Lopez denies suicidal/homicidal ideation, plan or intent  However he is frustrated and down over the fact he was removed from the transplant list  We continue to work on mindfulness, distress tolerance and radical acceptance along with CBT and solution focused therapy  Plan: Continue to help Tiffani manage his depression especially around his health issue  Behavioral Health Treatment Plan [facility]: Diagnosis and Treatment Plan explained to Doc Allison relates understanding diagnosis and is agreeable to Treatment Plan   Yes

## 2021-09-01 ENCOUNTER — TELEPHONE (OUTPATIENT)
Dept: INTERNAL MEDICINE CLINIC | Facility: CLINIC | Age: 36
End: 2021-09-01

## 2021-09-01 NOTE — TELEPHONE ENCOUNTER
Patients mom called and patient saw [facility] and they are going to fax paperwork over for patient to get pair of diabetic shoes  If there are any questions she can be reached at [phone]    Thank you

## 2021-09-07 DIAGNOSIS — F41.1 GENERALIZED ANXIETY DISORDER: ICD-10-CM

## 2021-09-07 RX ORDER — ESCITALOPRAM OXALATE 10 MG/1
TABLET ORAL
Qty: 27 TABLET | Refills: 0 | Status: SHIPPED | OUTPATIENT
Start: 2021-09-07 | End: 2021-10-05 | Stop reason: SDUPTHER

## 2021-09-09 ENCOUNTER — HOSPITAL ENCOUNTER (OUTPATIENT)
Dept: RADIOLOGY | Age: 36
Discharge: HOME | End: 2021-09-09
Payer: MEDICARE

## 2021-09-09 DIAGNOSIS — R91.8 OTHER NONSPECIFIC ABNORMAL FINDING OF LUNG FIELD: ICD-10-CM

## 2021-09-09 PROCEDURE — G1004 CDSM NDSC: HCPCS

## 2021-09-09 PROCEDURE — 71250 CT THORAX DX C-: CPT

## 2021-09-28 DIAGNOSIS — N17.0 ACUTE RENAL FAILURE WITH ACUTE TUBULAR NECROSIS SUPERIMPOSED ON STAGE 3 CHRONIC KIDNEY DISEASE (HCC): ICD-10-CM

## 2021-09-28 DIAGNOSIS — I16.0 HYPERTENSIVE URGENCY: ICD-10-CM

## 2021-09-28 DIAGNOSIS — H53.30 BINOCULAR VISUAL DISTURBANCE: ICD-10-CM

## 2021-09-28 DIAGNOSIS — G35 OPTIC NEURITIS DUE TO MULTIPLE SCLEROSIS (HCC): ICD-10-CM

## 2021-09-28 DIAGNOSIS — N18.2 ACUTE RENAL FAILURE WITH ACUTE TUBULAR NECROSIS SUPERIMPOSED ON STAGE 2 CHRONIC KIDNEY DISEASE (HCC): ICD-10-CM

## 2021-09-28 DIAGNOSIS — E83.39 HYPERPHOSPHATEMIA: ICD-10-CM

## 2021-09-28 DIAGNOSIS — R11.0 NAUSEA: ICD-10-CM

## 2021-09-28 DIAGNOSIS — E72.11 HYPERHOMOCYSTEINEMIA (HCC): ICD-10-CM

## 2021-09-28 DIAGNOSIS — R80.1 PERSISTENT PROTEINURIA: ICD-10-CM

## 2021-09-28 DIAGNOSIS — E55.9 VITAMIN D DEFICIENCY: ICD-10-CM

## 2021-09-28 DIAGNOSIS — R79.89 AZOTEMIA: ICD-10-CM

## 2021-09-28 DIAGNOSIS — I63.9 CEREBROVASCULAR ACCIDENT (CVA), UNSPECIFIED MECHANISM (HCC): ICD-10-CM

## 2021-09-28 DIAGNOSIS — I63.9 CEREBROVASCULAR ACCIDENT (CVA) OF LEFT PONTINE STRUCTURE (HCC): ICD-10-CM

## 2021-09-28 DIAGNOSIS — E10.21 TYPE 1 DIABETES MELLITUS WITH DIABETIC NEPHROPATHY, WITH LONG-TERM CURRENT USE OF INSULIN (HCC): Chronic | ICD-10-CM

## 2021-09-28 DIAGNOSIS — N17.0 ACUTE RENAL FAILURE WITH ACUTE TUBULAR NECROSIS SUPERIMPOSED ON STAGE 2 CHRONIC KIDNEY DISEASE (HCC): ICD-10-CM

## 2021-09-28 DIAGNOSIS — H53.8 BLURRED VISION: ICD-10-CM

## 2021-09-28 DIAGNOSIS — Z86.73 HISTORY OF LACUNAR CEREBROVASCULAR ACCIDENT (CVA): ICD-10-CM

## 2021-09-28 DIAGNOSIS — N18.30 ACUTE RENAL FAILURE WITH ACUTE TUBULAR NECROSIS SUPERIMPOSED ON STAGE 3 CHRONIC KIDNEY DISEASE (HCC): ICD-10-CM

## 2021-09-28 DIAGNOSIS — H51.0 BINOCULAR VISION DISORDER WITH CONJUGATE GAZE PALSY: ICD-10-CM

## 2021-09-28 DIAGNOSIS — H46.9 OPTIC NEURITIS DUE TO MULTIPLE SCLEROSIS (HCC): ICD-10-CM

## 2021-09-28 RX ORDER — ATORVASTATIN CALCIUM 40 MG/1
40 TABLET, FILM COATED ORAL EVERY EVENING
Qty: 90 TABLET | Refills: 1 | Status: SHIPPED | OUTPATIENT
Start: 2021-09-28 | End: 2022-03-22 | Stop reason: SDUPTHER

## 2021-09-29 ENCOUNTER — TELEPHONE (OUTPATIENT)
Dept: NEPHROLOGY | Facility: CLINIC | Age: 36
End: 2021-09-29

## 2021-09-29 NOTE — TELEPHONE ENCOUNTER
Patients mother Amber Capps called the office asking if she could please speak with someone in regards to her sons Transplant  She stated that when dealing with [facility] they make her aware the patient needs to follow up with all these different specialists  Once the appointments are completed she said that [facility] tells her of well you now need to follow up with someone else  The most is very concerned because she feels like she feels like things are going no where

## 2021-09-30 NOTE — TELEPHONE ENCOUNTER
[facility] [place]:    Called mom and talked through everything - we need the cards appt to be with Dr Josefina Buitrago - I entered a new order reflecting this - He has pulmonary hypertension and that is the new direction -she undersatands - can you please advise when he can get in with Dr Josefina Buitrago

## 2021-10-01 ENCOUNTER — REMOTE DEVICE CLINIC VISIT (OUTPATIENT)
Dept: CARDIOLOGY CLINIC | Facility: CLINIC | Age: 36
End: 2021-10-01
Payer: MEDICARE

## 2021-10-01 DIAGNOSIS — Z95.818 PRESENCE OF OTHER CARDIAC IMPLANTS AND GRAFTS: Primary | ICD-10-CM

## 2021-10-01 PROCEDURE — 93298 REM INTERROG DEV EVAL SCRMS: CPT | Performed by: INTERNAL MEDICINE

## 2021-10-01 PROCEDURE — G2066 INTER DEVC REMOTE 30D: HCPCS | Performed by: INTERNAL MEDICINE

## 2021-10-05 ENCOUNTER — OFFICE VISIT (OUTPATIENT)
Dept: INTERNAL MEDICINE CLINIC | Facility: CLINIC | Age: 36
End: 2021-10-05
Payer: MEDICARE

## 2021-10-05 VITALS
RESPIRATION RATE: 16 BRPM | TEMPERATURE: 97.9 F | BODY MASS INDEX: 29.26 KG/M2 | SYSTOLIC BLOOD PRESSURE: 148 MMHG | HEART RATE: 71 BPM | WEIGHT: 209 LBS | HEIGHT: 71 IN | DIASTOLIC BLOOD PRESSURE: 80 MMHG | OXYGEN SATURATION: 97 %

## 2021-10-05 DIAGNOSIS — R91.8 MULTIPLE PULMONARY NODULES: ICD-10-CM

## 2021-10-05 DIAGNOSIS — R20.0 NUMBNESS OF ARM: ICD-10-CM

## 2021-10-05 DIAGNOSIS — F41.1 GENERALIZED ANXIETY DISORDER: ICD-10-CM

## 2021-10-05 DIAGNOSIS — Z23 ENCOUNTER FOR IMMUNIZATION: ICD-10-CM

## 2021-10-05 DIAGNOSIS — F33.0 MILD EPISODE OF RECURRENT MAJOR DEPRESSIVE DISORDER (HCC): ICD-10-CM

## 2021-10-05 DIAGNOSIS — Z99.2 TYPE 1 DIABETES MELLITUS WITH CHRONIC KIDNEY DISEASE ON CHRONIC DIALYSIS (HCC): Primary | ICD-10-CM

## 2021-10-05 DIAGNOSIS — N18.6 TYPE 1 DIABETES MELLITUS WITH CHRONIC KIDNEY DISEASE ON CHRONIC DIALYSIS (HCC): Primary | ICD-10-CM

## 2021-10-05 DIAGNOSIS — E10.22 TYPE 1 DIABETES MELLITUS WITH CHRONIC KIDNEY DISEASE ON CHRONIC DIALYSIS (HCC): Primary | ICD-10-CM

## 2021-10-05 DIAGNOSIS — Z00.00 MEDICARE ANNUAL WELLNESS VISIT, SUBSEQUENT: ICD-10-CM

## 2021-10-05 DIAGNOSIS — L29.9 PRURITUS: ICD-10-CM

## 2021-10-05 DIAGNOSIS — E10.42 DIABETIC POLYNEUROPATHY ASSOCIATED WITH TYPE 1 DIABETES MELLITUS (HCC): ICD-10-CM

## 2021-10-05 PROBLEM — E66.811 OBESITY (BMI 30.0-34.9): Status: RESOLVED | Noted: 2019-09-09 | Resolved: 2021-10-05

## 2021-10-05 PROBLEM — R11.10 EMESIS: Status: RESOLVED | Noted: 2020-10-24 | Resolved: 2021-10-05

## 2021-10-05 PROBLEM — E66.9 OBESITY (BMI 30.0-34.9): Status: RESOLVED | Noted: 2019-09-09 | Resolved: 2021-10-05

## 2021-10-05 PROCEDURE — 99214 OFFICE O/P EST MOD 30 MIN: CPT | Performed by: INTERNAL MEDICINE

## 2021-10-05 PROCEDURE — G0438 PPPS, INITIAL VISIT: HCPCS | Performed by: INTERNAL MEDICINE

## 2021-10-05 PROCEDURE — G0008 ADMIN INFLUENZA VIRUS VAC: HCPCS | Performed by: INTERNAL MEDICINE

## 2021-10-05 PROCEDURE — 90686 IIV4 VACC NO PRSV 0.5 ML IM: CPT | Performed by: INTERNAL MEDICINE

## 2021-10-05 RX ORDER — ESCITALOPRAM OXALATE 10 MG/1
10 TABLET ORAL DAILY
Qty: 90 TABLET | Refills: 1 | Status: SHIPPED | OUTPATIENT
Start: 2021-10-05 | End: 2022-03-28 | Stop reason: SDUPTHER

## 2021-10-05 RX ORDER — HYDROXYZINE HYDROCHLORIDE 10 MG/1
10 TABLET, FILM COATED ORAL EVERY 6 HOURS PRN
Qty: 30 TABLET | Refills: 0 | Status: SHIPPED | OUTPATIENT
Start: 2021-10-05

## 2021-10-05 RX ORDER — AMOXICILLIN 500 MG/1
CAPSULE ORAL
COMMUNITY
Start: 2021-09-22 | End: 2022-07-16

## 2021-10-07 ENCOUNTER — TELEPHONE (OUTPATIENT)
Dept: NEPHROLOGY | Facility: CLINIC | Age: 36
End: 2021-10-07

## 2021-10-13 ENCOUNTER — TELEPHONE (OUTPATIENT)
Dept: INTERNAL MEDICINE CLINIC | Facility: CLINIC | Age: 36
End: 2021-10-13

## 2021-10-13 DIAGNOSIS — R20.0 NUMBNESS OF ARM: Primary | ICD-10-CM

## 2021-10-19 ENCOUNTER — EVALUATION (OUTPATIENT)
Dept: PHYSICAL THERAPY | Facility: CLINIC | Age: 36
End: 2021-10-19
Payer: MEDICARE

## 2021-10-19 ENCOUNTER — EVALUATION (OUTPATIENT)
Dept: OCCUPATIONAL THERAPY | Facility: CLINIC | Age: 36
End: 2021-10-19
Payer: MEDICARE

## 2021-10-19 DIAGNOSIS — Z86.73 HISTORY OF CVA (CEREBROVASCULAR ACCIDENT): ICD-10-CM

## 2021-10-19 DIAGNOSIS — R29.898 IMPAIRED STRENGTH OF UPPER EXTREMITY: Primary | ICD-10-CM

## 2021-10-19 DIAGNOSIS — Z74.09 IMPAIRED FUNCTIONAL MOBILITY, BALANCE, GAIT, AND ENDURANCE: Primary | ICD-10-CM

## 2021-10-19 PROCEDURE — 97112 NEUROMUSCULAR REEDUCATION: CPT | Performed by: PHYSICAL THERAPIST

## 2021-10-19 PROCEDURE — 97162 PT EVAL MOD COMPLEX 30 MIN: CPT | Performed by: PHYSICAL THERAPIST

## 2021-10-19 PROCEDURE — 97167 OT EVAL HIGH COMPLEX 60 MIN: CPT

## 2021-10-19 PROCEDURE — 97530 THERAPEUTIC ACTIVITIES: CPT

## 2021-10-20 ENCOUNTER — TELEPHONE (OUTPATIENT)
Dept: NEUROLOGY | Facility: CLINIC | Age: 36
End: 2021-10-20

## 2021-10-21 ENCOUNTER — HOSPITAL ENCOUNTER (EMERGENCY)
Facility: HOSPITAL | Age: 36
Discharge: HOME | End: 2021-10-21
Attending: EMERGENCY MEDICINE
Payer: MEDICARE

## 2021-10-21 VITALS
DIASTOLIC BLOOD PRESSURE: 93 MMHG | HEART RATE: 77 BPM | TEMPERATURE: 98.4 F | OXYGEN SATURATION: 98 % | SYSTOLIC BLOOD PRESSURE: 169 MMHG | RESPIRATION RATE: 18 BRPM

## 2021-10-21 DIAGNOSIS — H65.191 ACUTE MIDDLE EAR EFFUSION, RIGHT: Primary | ICD-10-CM

## 2021-10-21 PROCEDURE — 99284 EMERGENCY DEPT VISIT MOD MDM: CPT | Performed by: EMERGENCY MEDICINE

## 2021-10-21 PROCEDURE — 99282 EMERGENCY DEPT VISIT SF MDM: CPT

## 2021-10-21 RX ORDER — ECHINACEA PURPUREA EXTRACT 125 MG
2 TABLET ORAL ONCE
Status: COMPLETED | OUTPATIENT
Start: 2021-10-21 | End: 2021-10-21

## 2021-10-21 RX ADMIN — SALINE NASAL SPRAY 2 SPRAY: 1.5 SOLUTION NASAL at 22:52

## 2021-10-25 DIAGNOSIS — K31.84 GASTROPARESIS DIABETICORUM (HCC): ICD-10-CM

## 2021-10-25 DIAGNOSIS — E11.43 GASTROPARESIS DIABETICORUM (HCC): ICD-10-CM

## 2021-10-25 RX ORDER — FAMOTIDINE 40 MG/1
40 TABLET, FILM COATED ORAL 2 TIMES DAILY
Qty: 60 TABLET | Refills: 5 | Status: SHIPPED | OUTPATIENT
Start: 2021-10-25 | End: 2021-10-26

## 2021-10-28 ENCOUNTER — OFFICE VISIT (OUTPATIENT)
Dept: PHYSICAL THERAPY | Facility: CLINIC | Age: 36
End: 2021-10-28
Payer: MEDICARE

## 2021-10-28 ENCOUNTER — OFFICE VISIT (OUTPATIENT)
Dept: OCCUPATIONAL THERAPY | Facility: CLINIC | Age: 36
End: 2021-10-28
Payer: MEDICARE

## 2021-10-28 DIAGNOSIS — Z74.09 IMPAIRED FUNCTIONAL MOBILITY, BALANCE, GAIT, AND ENDURANCE: Primary | ICD-10-CM

## 2021-10-28 DIAGNOSIS — R29.898 IMPAIRED STRENGTH OF UPPER EXTREMITY: Primary | ICD-10-CM

## 2021-10-28 DIAGNOSIS — Z86.73 HISTORY OF CVA (CEREBROVASCULAR ACCIDENT): ICD-10-CM

## 2021-10-28 PROCEDURE — 97530 THERAPEUTIC ACTIVITIES: CPT

## 2021-10-28 PROCEDURE — 97112 NEUROMUSCULAR REEDUCATION: CPT

## 2021-10-28 PROCEDURE — 97110 THERAPEUTIC EXERCISES: CPT

## 2021-11-02 ENCOUNTER — APPOINTMENT (OUTPATIENT)
Dept: OCCUPATIONAL THERAPY | Facility: CLINIC | Age: 36
End: 2021-11-02
Payer: MEDICARE

## 2021-11-02 ENCOUNTER — APPOINTMENT (OUTPATIENT)
Dept: PHYSICAL THERAPY | Facility: CLINIC | Age: 36
End: 2021-11-02
Payer: MEDICARE

## 2021-11-04 ENCOUNTER — OFFICE VISIT (OUTPATIENT)
Dept: PHYSICAL THERAPY | Facility: CLINIC | Age: 36
End: 2021-11-04
Payer: MEDICARE

## 2021-11-04 ENCOUNTER — OFFICE VISIT (OUTPATIENT)
Dept: OCCUPATIONAL THERAPY | Facility: CLINIC | Age: 36
End: 2021-11-04
Payer: MEDICARE

## 2021-11-04 DIAGNOSIS — R29.898 IMPAIRED STRENGTH OF UPPER EXTREMITY: Primary | ICD-10-CM

## 2021-11-04 DIAGNOSIS — Z86.73 HISTORY OF CVA (CEREBROVASCULAR ACCIDENT): ICD-10-CM

## 2021-11-04 DIAGNOSIS — Z74.09 IMPAIRED FUNCTIONAL MOBILITY, BALANCE, GAIT, AND ENDURANCE: Primary | ICD-10-CM

## 2021-11-04 PROCEDURE — 97110 THERAPEUTIC EXERCISES: CPT

## 2021-11-04 PROCEDURE — 97112 NEUROMUSCULAR REEDUCATION: CPT

## 2021-11-04 PROCEDURE — 97530 THERAPEUTIC ACTIVITIES: CPT

## 2021-11-09 ENCOUNTER — APPOINTMENT (OUTPATIENT)
Dept: PHYSICAL THERAPY | Facility: CLINIC | Age: 36
End: 2021-11-09
Payer: MEDICARE

## 2021-11-09 ENCOUNTER — APPOINTMENT (OUTPATIENT)
Dept: OCCUPATIONAL THERAPY | Facility: CLINIC | Age: 36
End: 2021-11-09
Payer: MEDICARE

## 2021-11-11 ENCOUNTER — OFFICE VISIT (OUTPATIENT)
Dept: OCCUPATIONAL THERAPY | Facility: CLINIC | Age: 36
End: 2021-11-11
Payer: MEDICARE

## 2021-11-11 ENCOUNTER — OFFICE VISIT (OUTPATIENT)
Dept: PHYSICAL THERAPY | Facility: CLINIC | Age: 36
End: 2021-11-11
Payer: MEDICARE

## 2021-11-11 DIAGNOSIS — R29.898 IMPAIRED STRENGTH OF UPPER EXTREMITY: Primary | ICD-10-CM

## 2021-11-11 DIAGNOSIS — Z74.09 IMPAIRED FUNCTIONAL MOBILITY, BALANCE, GAIT, AND ENDURANCE: Primary | ICD-10-CM

## 2021-11-11 DIAGNOSIS — Z86.73 HISTORY OF CVA (CEREBROVASCULAR ACCIDENT): ICD-10-CM

## 2021-11-11 PROCEDURE — 97530 THERAPEUTIC ACTIVITIES: CPT

## 2021-11-11 PROCEDURE — 97110 THERAPEUTIC EXERCISES: CPT

## 2021-11-11 PROCEDURE — 97112 NEUROMUSCULAR REEDUCATION: CPT

## 2021-11-15 ENCOUNTER — TELEPHONE (OUTPATIENT)
Dept: GASTROENTEROLOGY | Facility: AMBULARY SURGERY CENTER | Age: 36
End: 2021-11-15

## 2021-11-16 ENCOUNTER — OFFICE VISIT (OUTPATIENT)
Dept: OCCUPATIONAL THERAPY | Facility: CLINIC | Age: 36
End: 2021-11-16
Payer: MEDICARE

## 2021-11-16 ENCOUNTER — OFFICE VISIT (OUTPATIENT)
Dept: PHYSICAL THERAPY | Facility: CLINIC | Age: 36
End: 2021-11-16
Payer: MEDICARE

## 2021-11-16 DIAGNOSIS — Z74.09 IMPAIRED FUNCTIONAL MOBILITY, BALANCE, GAIT, AND ENDURANCE: Primary | ICD-10-CM

## 2021-11-16 DIAGNOSIS — Z86.73 HISTORY OF CVA (CEREBROVASCULAR ACCIDENT): ICD-10-CM

## 2021-11-16 DIAGNOSIS — R29.898 IMPAIRED STRENGTH OF UPPER EXTREMITY: Primary | ICD-10-CM

## 2021-11-16 PROCEDURE — 97110 THERAPEUTIC EXERCISES: CPT

## 2021-11-16 PROCEDURE — 97530 THERAPEUTIC ACTIVITIES: CPT

## 2021-11-16 PROCEDURE — 97110 THERAPEUTIC EXERCISES: CPT | Performed by: PHYSICAL THERAPIST

## 2021-11-16 PROCEDURE — 97112 NEUROMUSCULAR REEDUCATION: CPT | Performed by: PHYSICAL THERAPIST

## 2021-11-18 ENCOUNTER — APPOINTMENT (OUTPATIENT)
Dept: PHYSICAL THERAPY | Facility: CLINIC | Age: 36
End: 2021-11-18
Payer: MEDICARE

## 2021-11-23 ENCOUNTER — APPOINTMENT (OUTPATIENT)
Dept: PHYSICAL THERAPY | Facility: CLINIC | Age: 36
End: 2021-11-23
Payer: MEDICARE

## 2021-11-23 ENCOUNTER — OFFICE VISIT (OUTPATIENT)
Dept: PSYCHIATRY | Facility: CLINIC | Age: 36
End: 2021-11-23
Payer: MEDICARE

## 2021-11-23 DIAGNOSIS — F41.1 GENERALIZED ANXIETY DISORDER: Primary | ICD-10-CM

## 2021-11-23 DIAGNOSIS — F33.0 MILD EPISODE OF RECURRENT MAJOR DEPRESSIVE DISORDER (HCC): ICD-10-CM

## 2021-11-23 DIAGNOSIS — F51.04 PSYCHOPHYSIOLOGICAL INSOMNIA: ICD-10-CM

## 2021-11-23 PROCEDURE — 90833 PSYTX W PT W E/M 30 MIN: CPT | Performed by: STUDENT IN AN ORGANIZED HEALTH CARE EDUCATION/TRAINING PROGRAM

## 2021-11-23 PROCEDURE — 99214 OFFICE O/P EST MOD 30 MIN: CPT | Performed by: STUDENT IN AN ORGANIZED HEALTH CARE EDUCATION/TRAINING PROGRAM

## 2021-11-23 RX ORDER — TRAZODONE HYDROCHLORIDE 50 MG/1
50 TABLET ORAL
Qty: 30 TABLET | Refills: 1 | Status: ON HOLD | OUTPATIENT
Start: 2021-11-23 | End: 2022-07-16 | Stop reason: SDUPTHER

## 2021-11-30 ENCOUNTER — APPOINTMENT (OUTPATIENT)
Dept: OCCUPATIONAL THERAPY | Facility: CLINIC | Age: 36
End: 2021-11-30
Payer: MEDICARE

## 2021-11-30 ENCOUNTER — APPOINTMENT (OUTPATIENT)
Dept: PHYSICAL THERAPY | Facility: CLINIC | Age: 36
End: 2021-11-30
Payer: MEDICARE

## 2021-12-02 ENCOUNTER — OFFICE VISIT (OUTPATIENT)
Dept: OCCUPATIONAL THERAPY | Facility: CLINIC | Age: 36
End: 2021-12-02
Payer: MEDICARE

## 2021-12-02 DIAGNOSIS — R29.898 IMPAIRED STRENGTH OF UPPER EXTREMITY: Primary | ICD-10-CM

## 2021-12-02 PROCEDURE — 97110 THERAPEUTIC EXERCISES: CPT

## 2021-12-02 PROCEDURE — 97150 GROUP THERAPEUTIC PROCEDURES: CPT

## 2021-12-09 ENCOUNTER — EVALUATION (OUTPATIENT)
Dept: OCCUPATIONAL THERAPY | Facility: CLINIC | Age: 36
End: 2021-12-09
Payer: MEDICARE

## 2021-12-09 DIAGNOSIS — R29.898 IMPAIRED STRENGTH OF UPPER EXTREMITY: Primary | ICD-10-CM

## 2021-12-09 PROCEDURE — 97530 THERAPEUTIC ACTIVITIES: CPT

## 2021-12-13 ENCOUNTER — SOCIAL WORK (OUTPATIENT)
Dept: BEHAVIORAL/MENTAL HEALTH CLINIC | Facility: CLINIC | Age: 36
End: 2021-12-13
Payer: MEDICARE

## 2021-12-13 DIAGNOSIS — F41.1 GENERALIZED ANXIETY DISORDER: ICD-10-CM

## 2021-12-13 DIAGNOSIS — F33.0 MILD EPISODE OF RECURRENT MAJOR DEPRESSIVE DISORDER (HCC): Primary | ICD-10-CM

## 2021-12-13 PROCEDURE — 90834 PSYTX W PT 45 MINUTES: CPT | Performed by: SOCIAL WORKER

## 2021-12-14 ENCOUNTER — APPOINTMENT (OUTPATIENT)
Dept: OCCUPATIONAL THERAPY | Facility: CLINIC | Age: 36
End: 2021-12-14
Payer: MEDICARE

## 2021-12-16 ENCOUNTER — APPOINTMENT (OUTPATIENT)
Dept: OCCUPATIONAL THERAPY | Facility: CLINIC | Age: 36
End: 2021-12-16
Payer: MEDICARE

## 2021-12-21 ENCOUNTER — APPOINTMENT (OUTPATIENT)
Dept: OCCUPATIONAL THERAPY | Facility: CLINIC | Age: 36
End: 2021-12-21
Payer: MEDICARE

## 2021-12-23 ENCOUNTER — APPOINTMENT (OUTPATIENT)
Dept: OCCUPATIONAL THERAPY | Facility: CLINIC | Age: 36
End: 2021-12-23
Payer: MEDICARE

## 2021-12-23 ENCOUNTER — APPOINTMENT (OUTPATIENT)
Dept: LAB | Facility: CLINIC | Age: 36
End: 2021-12-23
Payer: MEDICARE

## 2021-12-23 DIAGNOSIS — I50.810 BERNHEIM'S SYNDROME (HCC): ICD-10-CM

## 2021-12-23 DIAGNOSIS — R06.02 SHORTNESS OF BREATH: ICD-10-CM

## 2021-12-23 DIAGNOSIS — I27.20 PORTOPULMONARY HYPERTENSION (HCC): ICD-10-CM

## 2021-12-23 DIAGNOSIS — K76.6 PORTOPULMONARY HYPERTENSION (HCC): ICD-10-CM

## 2021-12-23 LAB
ALBUMIN SERPL BCP-MCNC: 3.9 G/DL (ref 3.5–5)
ALP SERPL-CCNC: 105 U/L (ref 46–116)
ALT SERPL W P-5'-P-CCNC: 21 U/L (ref 12–78)
ANION GAP SERPL CALCULATED.3IONS-SCNC: 11 MMOL/L (ref 4–13)
AST SERPL W P-5'-P-CCNC: 18 U/L (ref 5–45)
BILIRUB SERPL-MCNC: 0.66 MG/DL (ref 0.2–1)
BUN SERPL-MCNC: 33 MG/DL (ref 5–25)
CALCIUM SERPL-MCNC: 9.1 MG/DL (ref 8.3–10.1)
CHLORIDE SERPL-SCNC: 101 MMOL/L (ref 100–108)
CO2 SERPL-SCNC: 29 MMOL/L (ref 21–32)
CREAT SERPL-MCNC: 8.14 MG/DL (ref 0.6–1.3)
ERYTHROCYTE [DISTWIDTH] IN BLOOD BY AUTOMATED COUNT: 13.7 % (ref 11.6–15.1)
GFR SERPL CREATININE-BSD FRML MDRD: 7 ML/MIN/1.73SQ M
GLUCOSE P FAST SERPL-MCNC: 191 MG/DL (ref 65–99)
HBV CORE AB SER QL: NORMAL
HBV SURFACE AB SER-ACNC: 426.8 MIU/ML
HBV SURFACE AG SER QL: NORMAL
HCT VFR BLD AUTO: 37.9 % (ref 36.5–49.3)
HCV AB SER QL: NORMAL
HGB BLD-MCNC: 12.3 G/DL (ref 12–17)
INR PPP: 1 (ref 0.84–1.19)
MCH RBC QN AUTO: 31.7 PG (ref 26.8–34.3)
MCHC RBC AUTO-ENTMCNC: 32.5 G/DL (ref 31.4–37.4)
MCV RBC AUTO: 98 FL (ref 82–98)
NT-PROBNP SERPL-MCNC: 7339 PG/ML
PLATELET # BLD AUTO: 246 THOUSANDS/UL (ref 149–390)
PMV BLD AUTO: 9.4 FL (ref 8.9–12.7)
POTASSIUM SERPL-SCNC: 4.6 MMOL/L (ref 3.5–5.3)
PROT SERPL-MCNC: 7 G/DL (ref 6.4–8.2)
PROTHROMBIN TIME: 13.2 SECONDS (ref 11.6–14.5)
RBC # BLD AUTO: 3.88 MILLION/UL (ref 3.88–5.62)
SODIUM SERPL-SCNC: 141 MMOL/L (ref 136–145)
T4 FREE SERPL-MCNC: 0.74 NG/DL (ref 0.76–1.46)
TSH SERPL DL<=0.05 MIU/L-ACNC: 4.16 UIU/ML (ref 0.36–3.74)
WBC # BLD AUTO: 5.05 THOUSAND/UL (ref 4.31–10.16)

## 2021-12-23 PROCEDURE — 86704 HEP B CORE ANTIBODY TOTAL: CPT

## 2021-12-23 PROCEDURE — 36415 COLL VENOUS BLD VENIPUNCTURE: CPT

## 2021-12-23 PROCEDURE — 80053 COMPREHEN METABOLIC PANEL: CPT

## 2021-12-23 PROCEDURE — 86803 HEPATITIS C AB TEST: CPT

## 2021-12-23 PROCEDURE — 85027 COMPLETE CBC AUTOMATED: CPT

## 2021-12-23 PROCEDURE — 86706 HEP B SURFACE ANTIBODY: CPT

## 2021-12-23 PROCEDURE — 84439 ASSAY OF FREE THYROXINE: CPT

## 2021-12-23 PROCEDURE — 87340 HEPATITIS B SURFACE AG IA: CPT

## 2021-12-23 PROCEDURE — 83880 ASSAY OF NATRIURETIC PEPTIDE: CPT

## 2021-12-23 PROCEDURE — 85610 PROTHROMBIN TIME: CPT

## 2021-12-23 PROCEDURE — 84443 ASSAY THYROID STIM HORMONE: CPT

## 2021-12-28 ENCOUNTER — APPOINTMENT (OUTPATIENT)
Dept: OCCUPATIONAL THERAPY | Facility: CLINIC | Age: 36
End: 2021-12-28
Payer: MEDICARE

## 2021-12-30 ENCOUNTER — APPOINTMENT (OUTPATIENT)
Dept: OCCUPATIONAL THERAPY | Facility: CLINIC | Age: 36
End: 2021-12-30
Payer: MEDICARE

## 2022-01-05 ENCOUNTER — HOSPITAL ENCOUNTER (EMERGENCY)
Facility: HOSPITAL | Age: 37
Discharge: HOME | End: 2022-01-05
Attending: EMERGENCY MEDICINE | Admitting: EMERGENCY MEDICINE
Payer: MEDICARE

## 2022-01-05 VITALS
RESPIRATION RATE: 20 BRPM | DIASTOLIC BLOOD PRESSURE: 88 MMHG | SYSTOLIC BLOOD PRESSURE: 193 MMHG | OXYGEN SATURATION: 100 % | TEMPERATURE: 98.4 F | HEART RATE: 82 BPM

## 2022-01-05 DIAGNOSIS — Z99.2 NEED FOR ACUTE HEMODIALYSIS (HCC): Primary | ICD-10-CM

## 2022-01-05 LAB
ATRIAL RATE: 86 BPM
P AXIS: 67 DEGREES
PR INTERVAL: 178 MS
QRS AXIS: 75 DEGREES
QRSD INTERVAL: 70 MS
QT INTERVAL: 398 MS
QTC INTERVAL: 476 MS
T WAVE AXIS: 74 DEGREES
VENTRICULAR RATE: 86 BPM

## 2022-01-05 PROCEDURE — 99284 EMERGENCY DEPT VISIT MOD MDM: CPT | Performed by: EMERGENCY MEDICINE

## 2022-01-05 PROCEDURE — 93010 ELECTROCARDIOGRAM REPORT: CPT | Performed by: INTERNAL MEDICINE

## 2022-01-05 PROCEDURE — 93005 ELECTROCARDIOGRAM TRACING: CPT

## 2022-01-05 PROCEDURE — 99283 EMERGENCY DEPT VISIT LOW MDM: CPT

## 2022-01-05 NOTE — ED PROVIDER NOTES
History  Chief Complaint   Patient presents with    Medical Problem     pt has no physical complaints in triage but states he needs help transf  to a different dialysis center  also due for dialysis today  75-year-old male with a history of end-stage renal disease on hemodialysis normally Monday Wednesdays and Fridays presents the emergency department requesting help finding a new hemodialysis center  Patient is unhappy at the [facility] [place] where he is currently receiving treatments and does not feel safe there  He is scheduled to transfer facilities after today's treatment  I did discuss this with the patient's nephrologist Dr Sekou Renteria   The plan is to have the patient complete his last hemodialysis treatment today at the [facility] [place] and then plan for a new facility with his next treatment  He has not received dialysis since Sunday due to holiday schedule  Patient denies chest pain or shortness of breath  He makes very little urine  No fevers  History provided by:  Patient, medical records and parent   used: No    Medical Problem  Location:  Looking for new hemodialysis center  Quality:  ESRD on HD  Severity:  Unable to specify  Timing:  Constant  Progression:  Unchanged  Chronicity:  Chronic  Context:  Has been unhappy with his dialysis center for a while  Relieved by:  Looking for transfer to a facility in [place]  Worsened by:  Nothing  Ineffective treatments:  None  Associated symptoms: no fever and no vomiting        Prior to Admission Medications   Prescriptions Last Dose Informant Patient Reported? Taking? Admelog SoloStar 100 units/mL injection pen  Mother Yes No   Sig: Inject 6 Units under the skin 3 (three) times a day with meals    B-D ULTRAFINE III SHORT PEN 31G X 8 MM MISC  Mother No No   Sig: USE 1 PEN NEEDLE 8 TIMES DAILY   GLUCAGON EMERGENCY 1 MG injection  Mother Yes No   Sig: INJECT 1MG SUBCUTANEOUSLY AS NEEDED (AS DIRECTED)   MAY REPEAT DOSE EVERY 20 MINUTES AS NEEDED   Lokelma 5 g PACK  Mother Yes No   Sig: see administration instructions Once daily on non dialysis days   NIFEdipine ER (ADALAT CC) 60 MG 24 hr tablet  Mother No No   Sig: Take 1 tablet (60 mg total) by mouth 2 (two) times a day   amoxicillin (AMOXIL) 500 mg capsule   Yes No   Sig: TAKE 4 CAPSULES BY MOUTH 1 HOUR PRIOR TO PROCEDURE   aspirin (ECOTRIN LOW STRENGTH) 81 mg EC tablet  Mother Yes No   Sig: Take 81 mg by mouth daily   atorvastatin (LIPITOR) 40 mg tablet   No No   Sig: Take 1 tablet (40 mg total) by mouth every evening   b complex vitamins capsule  Mother Yes No   Sig: Take 1 capsule by mouth daily before lunch    calcium acetate (PHOSLO) capsule  Mother Yes No   Sig: TAKE 3 CAPSULES BY MOUTH WITH MEALS   cholecalciferol (VITAMIN D3) 1,000 units tablet  Mother Yes No   Sig: Take 1,000 Units by mouth daily   cinacalcet (SENSIPAR) 60 MG tablet  Mother Yes No   Sig: Take 60 mg by mouth daily Non-dialysis days   Patient not taking: Reported on 10/5/2021   cloNIDine (CATAPRES-TTS-3) 0 3 mg/24 hr  Mother Yes No   Si patch once a week    doxazosin (CARDURA) 2 mg tablet  Mother No No   Sig: TAKE 2 TABLETS BY MOUTH IN THE MORNING AND 2 IN THE EVENING   Patient taking differently: 2 mg daily    doxazosin (CARDURA) 8 MG tablet  Mother Yes No   Sig: Take 8 mg by mouth daily at bedtime   escitalopram (LEXAPRO) 10 mg tablet   No No   Sig: Take 1 tablet (10 mg total) by mouth daily   famotidine (PEPCID) 40 MG tablet   No No   Sig: Take 1 tablet (40 mg total) by mouth 2 (two) times a day   gabapentin (NEURONTIN) 100 mg capsule  Mother No No   Sig: Take 2 tablets at bedtime   hydrALAZINE (APRESOLINE) 100 MG tablet  Mother No No   Sig: Take 0 5 tablets (50 mg total) by mouth 2 (two) times a day   hydrOXYzine HCL (ATARAX) 10 mg tablet   No No   Sig: Take 1 tablet (10 mg total) by mouth every 6 (six) hours as needed for itching   insulin NPH (HumuLIN N,NovoLIN N) 100 Units/mL subcutaneous injection   Yes No   Sig: Inject 6 Units under the skin daily at bedtime   insulin glargine (Basaglar KwikPen) 100 units/mL injection pen  Mother No No   Sig: Inject 14 Units under the skin daily at bedtime   Patient taking differently: Inject 5 Units under the skin daily at bedtime    insulin lispro (HumaLOG) 100 units/mL injection  Mother No No   Sig: Inject 4 Units under the skin 3 (three) times a day with meals   Patient not taking: Reported on 10/5/2021   labetalol (NORMODYNE) 200 mg tablet  Mother No No   Sig: Take 2 tablets (400 mg total) by mouth 2 (two) times a day   Patient taking differently: Take 400 mg by mouth 3 (three) times a day    lisinopril (ZESTRIL) 20 mg tablet  Mother No No   Sig: Take 1 tablet (20 mg total) by mouth daily   metolazone (ZAROXOLYN) 10 mg tablet  Mother Yes No   Sig: Take 5 mg by mouth daily    minoxidil (LONITEN) 2 5 mg tablet  Mother Yes No   Sig: Take 2 5 mg by mouth 2 (two) times a day    ondansetron (ZOFRAN) 4 mg tablet  Mother Yes No   Sig: Take 4 mg by mouth every 8 (eight) hours as needed for nausea or vomiting   saccharomyces boulardii (FLORASTOR) 250 mg capsule  Mother No No   Sig: Take 1 capsule (250 mg total) by mouth 2 (two) times a day   Patient taking differently: Take 250 mg by mouth daily    torsemide (DEMADEX) 100 mg tablet  Mother Yes No   Sig: Take 100 mg by mouth 2 (two) times a day    traZODone (DESYREL) 50 mg tablet   No No   Sig: Take 1 tablet (50 mg total) by mouth daily at bedtime as needed for sleep      Facility-Administered Medications: None       Past Medical History:   Diagnosis Date    [facility] [facility]     Ambulates with cane     Anuria     Anxiety     Cellulitis of right elbow 3/31/2021    Chronic kidney disease     Depression     Diabetes mellitus ([facility] [facility]     Diarrhea     Emesis 10/24/2020    Falls     Gastroparesis     GERD (gastroesophageal reflux disease)     History of shingles 2010    History of transfusion 02/2018 no adverse reaction    Hyperlipidemia     Hyperphosphatemia     Hypertension     Itching     Muscle weakness     general unsteadiness    Obesity (BMI 30 0-34 9) 9/9/2019    PONV (postoperative nausea and vomiting) 1/26/2018    Recurrent peritonitis ([facility] [address] ) due to peritoneal dialysis catheter 7/31/2020    Retinopathy     Seizures ([facility] [address] )     early 2020 - one time    Skin abnormality     some dime size areas where skin was scratched from itching    Spontaneous bacterial peritonitis ([facility] [address] ) 10/19/2020    St[facility]     x2 - off balance/no driving/fatigue    Swelling of both lower extremities     Vomiting     Wears glasses        Past Surgical History:   Procedure Laterality Date    CARDIAC LOOP RECORDER  05/2018    COLONOSCOPY      EGD      EYE SURGERY Right     IR AV FISTULAGRAM/GRAFTOGRAM  2/23/2021    IR TUNNELED CENTRAL LINE PLACEMENT  2/16/2021    IR TUNNELED DIALYSIS CATHETER PLACEMENT  11/18/2020    IR TUNNELED DIALYSIS CATHETER REMOVAL  2/12/2021    IR TUNNELED DIALYSIS CATHETER REMOVAL  3/11/2021    PERITONEAL CATHETER INSERTION N/A 8/27/2018    Procedure: UNROOF PD CATHETER;  Surgeon: Danielle Justin DO;  [facility]: [facility];  Service: General    MN ANASTOMOSIS,AV,ANY SITE Left 11/9/2020    Procedure: CREATION FISTULA  ARTERIOVENOUS (AV) - LEFT WRIST;  Surgeon: Darnell Torres MD;  [facility];  Service: Vascular    MN ESOPHAGOGASTRODUODENOSCOPY TRANSORAL DIAGNOSTIC N/A 4/18/2019    Procedure: ESOPHAGOGASTRODUODENOSCOPY (EGD); Surgeon: Dianelys Olmstead MD;  Location: [facility];   Service: Gastroenterology    MN LAP INSERTION TUNNELED INTRAPERITONEAL CATHETER N/A 8/6/2018    Procedure: LAPAROSCOPIC PD CATHETER PLACEMENT;  Surgeon: Danielle Justin DO;  [facility];  Service: General    MN REMOVAL TUNNELED INTRAPERITONEAL CATHETER N/A 11/18/2020    Procedure: REMOVAL CATHETER PERITONEAL DIALYSIS;  Surgeon: Benita Lewis MD;  [facility];  Service: Miriam Forrester TONSILLECTOMY      UPPER GASTROINTESTINAL ENDOSCOPY         Family History   Problem Relation Age of Onset   Mk Pert Breast cancer Mother     Hypertension Mother     Hyperlipidemia Father     Hypertension Father     Leukemia Maternal Grandmother     Hyperlipidemia Maternal Grandfather     Hypertension Maternal Grandfather     Hyperlipidemia Paternal Grandmother     Hypertension Paternal Grandmother     Heart disease Paternal Grandfather         cardiac disorder    Diabetes Paternal Grandfather      I have reviewed and agree with the history as documented  E-Cigarette/Vaping    E-Cigarette Use Current Every Day User     Comments medical marijuana      E-Cigarette/Vaping Substances    Nicotine No     THC Yes     CBD Yes     Flavoring No     Other No     Unknown No      Social History     Tobacco Use    Smoking status: Former Smoker     Packs/day: 0 50     Years: 12 00     Pack years: 6 00     Types: Cigarettes     Quit date: 02/2018     Years since quitting: 3 9    Smokeless tobacco: Never Used    Tobacco comment: quit 2/2018   Vaping Use    Vaping Use: Every day    Substances: THC, CBD   Substance Use Topics    Alcohol use: Not Currently    Drug use: Yes     Types: Marijuana     Comment: medical marijuana       Review of Systems   Constitutional: Negative for fever  Gastrointestinal: Negative for vomiting  All other systems reviewed and are negative  Physical Exam  Physical Exam  Vitals reviewed  Constitutional:       General: He is not in acute distress  Appearance: Normal appearance  He is well-developed  Comments: Periorbital edema present   HENT:      Head: Normocephalic and atraumatic  Eyes:      Conjunctiva/sclera: Conjunctivae normal       Pupils: Pupils are equal, round, and reactive to light  Cardiovascular:      Rate and Rhythm: Normal rate and regular rhythm  No extrasystoles are present  Heart sounds: Normal heart sounds  No murmur heard  Arteriovenous access: left arteriovenous access is present  Pulmonary:      Effort: Pulmonary effort is normal  No accessory muscle usage or respiratory distress  Breath sounds: Normal breath sounds  No rhonchi or rales  Chest:      Chest wall: No tenderness  Abdominal:      General: Bowel sounds are normal  There is no distension  Palpations: Abdomen is soft  Tenderness: There is no abdominal tenderness  There is no guarding or rebound  Musculoskeletal:         General: No tenderness or deformity  Normal range of motion  Arms:       Cervical back: Normal range of motion and neck supple  Lymphadenopathy:      Cervical: No cervical adenopathy  Skin:     General: Skin is warm and dry  Findings: No rash  Neurological:      Mental Status: He is alert and oriented to person, place, and time  Coordination: Coordination normal       Deep Tendon Reflexes: Reflexes are normal and symmetric  Psychiatric:         Behavior: Behavior normal          Thought Content:  Thought content normal          Judgment: Judgment normal          Vital Signs  ED Triage Vitals [01/05/22 1018]   Temperature Pulse Respirations Blood Pressure SpO2   98 4 °F (36 9 °C) 82 20 (!) 193/88 100 %      Temp Source Heart Rate Source Patient Position - Orthostatic VS BP Location FiO2 (%)   Oral Monitor Sitting Left arm --      Pain Score       No Pain           Vitals:    01/05/22 1018   BP: (!) 193/88   Pulse: 82   Patient Position - Orthostatic VS: Sitting         Visual Acuity      ED Medications  Medications - No data to display    Diagnostic Studies  Results Reviewed     None                 No orders to display              Procedures  Procedures         ED Course                                             MDM  Number of Diagnoses or Management Options  Need for acute hemodialysis [facility]): new and requires workup     Amount and/or Complexity of Data Reviewed  Decide to obtain previous medical records or to obtain history from someone other than the patient: yes  Obtain history from someone other than the patient: yes  Discuss the patient with other providers: yes    Risk of Complications, Morbidity, and/or Mortality  General comments: Case discussed with Nephrology and patient has agreed to receive treatment today at [facility] with plans to start treatments at a new facility later this week  Patient Progress  Patient progress: stable      Disposition  Final diagnoses:   Need for acute hemodialysis [facility])     Time reflects when diagnosis was documented in both MDM as applicable and the Disposition within this note     Time User Action Codes Description Comment    1/5/2022 12:23 PM Humza Formosa Add [Z99 2] Need for acute hemodialysis [facility])       ED Disposition     ED Disposition Condition Date/Time Comment    Discharge Stable Wed Jan 5, 2022 12:22 PM Félix Steel discharge to home/self care              Follow-up Information    None         Discharge Medication List as of 1/5/2022 12:23 PM      CONTINUE these medications which have NOT CHANGED    Details   Admelog SoloStar 100 units/mL injection pen Inject 6 Units under the skin 3 (three) times a day with meals , Starting Fri 2/5/2021, Historical Med      amoxicillin (AMOXIL) 500 mg capsule TAKE 4 CAPSULES BY MOUTH 1 HOUR PRIOR TO PROCEDURE, Historical Med      aspirin (ECOTRIN LOW STRENGTH) 81 mg EC tablet Take 81 mg by mouth daily, Historical Med      atorvastatin (LIPITOR) 40 mg tablet Take 1 tablet (40 mg total) by mouth every evening, Starting Tue 9/28/2021, Normal      b complex vitamins capsule Take 1 capsule by mouth daily before lunch , Historical Med      B-D ULTRAFINE III SHORT PEN 31G X 8 MM MISC USE 1 PEN NEEDLE 8 TIMES DAILY, Normal      calcium acetate (PHOSLO) capsule TAKE 3 CAPSULES BY MOUTH WITH MEALS, Historical Med      cholecalciferol (VITAMIN D3) 1,000 units tablet Take 1,000 Units by mouth daily, Historical Med      cinacalcet (SENSIPAR) 60 MG tablet Take 60 mg by mouth daily Non-dialysis days, Historical Med      cloNIDine (CATAPRES-TTS-3) 0 3 mg/24 hr 1 patch once a week , Historical Med      !! doxazosin (CARDURA) 2 mg tablet TAKE 2 TABLETS BY MOUTH IN THE MORNING AND 2 IN THE EVENING, Normal      !! doxazosin (CARDURA) 8 MG tablet Take 8 mg by mouth daily at bedtime, Historical Med      escitalopram (LEXAPRO) 10 mg tablet Take 1 tablet (10 mg total) by mouth daily, Starting Tue 10/5/2021, Normal      famotidine (PEPCID) 40 MG tablet Take 1 tablet (40 mg total) by mouth 2 (two) times a day, Starting Tue 10/26/2021, Normal      gabapentin (NEURONTIN) 100 mg capsule Take 2 tablets at bedtime, Normal      GLUCAGON EMERGENCY 1 MG injection INJECT 1MG SUBCUTANEOUSLY AS NEEDED (AS DIRECTED)   MAY REPEAT DOSE EVERY 20 MINUTES AS NEEDED, Historical Med      hydrALAZINE (APRESOLINE) 100 MG tablet Take 0 5 tablets (50 mg total) by mouth 2 (two) times a day, Starting Thu 11/19/2020, Normal      hydrOXYzine HCL (ATARAX) 10 mg tablet Take 1 tablet (10 mg total) by mouth every 6 (six) hours as needed for itching, Starting Tue 10/5/2021, Normal      insulin glargine (Basaglar KwikPen) 100 units/mL injection pen Inject 14 Units under the skin daily at bedtime, Starting Thu 12/17/2020, No Print      insulin lispro (HumaLOG) 100 units/mL injection Inject 4 Units under the skin 3 (three) times a day with meals, Starting Wed 11/25/2020, Normal      insulin NPH (HumuLIN N,NovoLIN N) 100 Units/mL subcutaneous injection Inject 6 Units under the skin daily at bedtime, Historical Med      labetalol (NORMODYNE) 200 mg tablet Take 2 tablets (400 mg total) by mouth 2 (two) times a day, Starting Sun 4/4/2021, Normal      lisinopril (ZESTRIL) 20 mg tablet Take 1 tablet (20 mg total) by mouth daily, Starting Tue 10/27/2020, Normal      Lokelma 5 g PACK see administration instructions Once daily on non dialysis days, Starting Tue 3/16/2021, Historical Med metolazone (ZAROXOLYN) 10 mg tablet Take 5 mg by mouth daily , Historical Med      minoxidil (LONITEN) 2 5 mg tablet Take 2 5 mg by mouth 2 (two) times a day , Starting Tue 2/9/2021, Historical Med      NIFEdipine ER (ADALAT CC) 60 MG 24 hr tablet Take 1 tablet (60 mg total) by mouth 2 (two) times a day, Starting Tue 3/3/2020, No Print      ondansetron (ZOFRAN) 4 mg tablet Take 4 mg by mouth every 8 (eight) hours as needed for nausea or vomiting, Historical Med      saccharomyces boulardii (FLORASTOR) 250 mg capsule Take 1 capsule (250 mg total) by mouth 2 (two) times a day, Starting Mon 10/26/2020, Normal      torsemide (DEMADEX) 100 mg tablet Take 100 mg by mouth 2 (two) times a day , Historical Med      traZODone (DESYREL) 50 mg tablet Take 1 tablet (50 mg total) by mouth daily at bedtime as needed for sleep, Starting Tue 11/23/2021, Normal       !! - Potential duplicate medications found  Please discuss with provider  No discharge procedures on file      PDMP Review       Value Time User    PDMP Reviewed  Yes 8/2/2021  2:34 AM Luanne Sher MD          ED Provider  Electronically Signed by           Bob Winkler DO  01/05/22 7793

## 2022-01-06 ENCOUNTER — SOCIAL WORK (OUTPATIENT)
Dept: BEHAVIORAL/MENTAL HEALTH CLINIC | Facility: CLINIC | Age: 37
End: 2022-01-06
Payer: MEDICARE

## 2022-01-06 DIAGNOSIS — F33.0 MILD EPISODE OF RECURRENT MAJOR DEPRESSIVE DISORDER (HCC): Primary | ICD-10-CM

## 2022-01-06 DIAGNOSIS — F41.1 GENERALIZED ANXIETY DISORDER: ICD-10-CM

## 2022-01-06 PROCEDURE — 90834 PSYTX W PT 45 MINUTES: CPT | Performed by: SOCIAL WORKER

## 2022-01-06 NOTE — PSYCH
Psychotherapy Provided: Individual Psychotherapy 45 minutes     Length of time in session: 45 minutes, follow up in 2 week    Depression, anxiety    Goals addressed in session: Goal 1, Goal 2 and Goal 3      Pain:      moderate to severe    4    Current suicide risk : Low     Data:Paulo arrived for his session  He has had some issues with his dialysis center which has upset him  He discussed how this has affected his depression and his anxiety  He continues to deal with his multiple health issues  Assessment: He denies suicidal/homicidal ideation, plan or intent  However he is very upset over how he was and has been treated by this facility  He is going to a new dialysis center tomorrow  The undersigned provided support and encouragement along with mindfulness and distress tolerance  Plan: To continue to provide support thru this difficult time  Behavioral Health Treatment Plan [facility]: Diagnosis and Treatment Plan explained to Alex Carrillo relates understanding diagnosis and is agreeable to Treatment Plan   Yes

## 2022-01-10 ENCOUNTER — TELEPHONE (OUTPATIENT)
Dept: INTERNAL MEDICINE CLINIC | Facility: CLINIC | Age: 37
End: 2022-01-10

## 2022-01-10 DIAGNOSIS — E10.22 TYPE 1 DIABETES MELLITUS WITH CHRONIC KIDNEY DISEASE ON CHRONIC DIALYSIS (HCC): ICD-10-CM

## 2022-01-10 DIAGNOSIS — I27.20 PULMONARY HYPERTENSION (HCC): ICD-10-CM

## 2022-01-10 DIAGNOSIS — F33.0 MILD EPISODE OF RECURRENT MAJOR DEPRESSIVE DISORDER (HCC): ICD-10-CM

## 2022-01-10 DIAGNOSIS — F41.1 GENERALIZED ANXIETY DISORDER: ICD-10-CM

## 2022-01-10 DIAGNOSIS — Z94.89 TRANSPLANT RECIPIENT: Primary | ICD-10-CM

## 2022-01-10 DIAGNOSIS — I15.0 RENOVASCULAR HYPERTENSION: ICD-10-CM

## 2022-01-10 DIAGNOSIS — Z86.73 HISTORY OF LACUNAR CEREBROVASCULAR ACCIDENT (CVA): ICD-10-CM

## 2022-01-10 DIAGNOSIS — N18.6 ESRD (END STAGE RENAL DISEASE) (HCC): ICD-10-CM

## 2022-01-10 DIAGNOSIS — N18.6 TYPE 1 DIABETES MELLITUS WITH CHRONIC KIDNEY DISEASE ON CHRONIC DIALYSIS (HCC): ICD-10-CM

## 2022-01-10 DIAGNOSIS — Z99.2 TYPE 1 DIABETES MELLITUS WITH CHRONIC KIDNEY DISEASE ON CHRONIC DIALYSIS (HCC): ICD-10-CM

## 2022-01-10 NOTE — TELEPHONE ENCOUNTER
There was a referral placed by Dr Claudia Clemons in the system dated 1/4/22 but it was canceled? What happened?

## 2022-01-10 NOTE — TELEPHONE ENCOUNTER
Patients mom called and her son is on a wait list for a kidney transplant  Patient saw a cardiologist and they are asking patient to be seen for a home sleep study  She is asking for a referral   She can be reached at [phone]    Thank you

## 2022-01-10 NOTE — TELEPHONE ENCOUNTER
hospitals doctor did the original referral and when she called central scheduling she was told his primary would have to do the referral   Please advise and she can be reached at [phone]    Thank you

## 2022-01-17 DIAGNOSIS — E10.42 DIABETIC POLYNEUROPATHY ASSOCIATED WITH TYPE 1 DIABETES MELLITUS (HCC): ICD-10-CM

## 2022-01-17 RX ORDER — GABAPENTIN 100 MG/1
CAPSULE ORAL
Qty: 60 CAPSULE | Refills: 5 | Status: SHIPPED | OUTPATIENT
Start: 2022-01-17 | End: 2022-07-18 | Stop reason: SDUPTHER

## 2022-01-19 DIAGNOSIS — Z11.52 ENCOUNTER FOR SCREENING FOR COVID-19: Primary | ICD-10-CM

## 2022-01-19 PROCEDURE — U0003 INFECTIOUS AGENT DETECTION BY NUCLEIC ACID (DNA OR RNA); SEVERE ACUTE RESPIRATORY SYNDROME CORONAVIRUS 2 (SARS-COV-2) (CORONAVIRUS DISEASE [COVID-19]), AMPLIFIED PROBE TECHNIQUE, MAKING USE OF HIGH THROUGHPUT TECHNOLOGIES AS DESCRIBED BY CMS-2020-01-R: HCPCS | Performed by: INTERNAL MEDICINE

## 2022-01-19 PROCEDURE — U0005 INFEC AGEN DETEC AMPLI PROBE: HCPCS | Performed by: INTERNAL MEDICINE

## 2022-02-03 ENCOUNTER — SOCIAL WORK (OUTPATIENT)
Dept: BEHAVIORAL/MENTAL HEALTH CLINIC | Facility: CLINIC | Age: 37
End: 2022-02-03
Payer: MEDICARE

## 2022-02-03 DIAGNOSIS — F41.1 GENERALIZED ANXIETY DISORDER: ICD-10-CM

## 2022-02-03 DIAGNOSIS — F33.0 MILD EPISODE OF RECURRENT MAJOR DEPRESSIVE DISORDER (HCC): Primary | ICD-10-CM

## 2022-02-03 PROCEDURE — 90834 PSYTX W PT 45 MINUTES: CPT | Performed by: SOCIAL WORKER

## 2022-02-03 NOTE — PSYCH
Psychotherapy Provided: Individual Psychotherapy 45 minutes     Length of time in session: 45 minutes, follow up in 2 week    MDDR mild to moderate in relation to multiple health issues  Goals addressed in session: Goal 1, Goal 2 and Goal 3      Pain:      moderate to severe    6    Current suicide risk : Low     Data: Nohemi Braden is at his new dialysis center and is happy there  He discussed some of the problems he encountered at the last dialysis center  Therefore his depression and his anxiety are less due to being in his mind at a better run dialysis center  He is getting along with his parents and he continues to deal with his multiple health issues  Nohemi Braden wants to write a letter about his concerns to the past dialysis center and to his doctor  who referred him there  We discussed him typing up in a factual manner the incidents he encountered  Assessment: Nohemi Braden denies suicidal/homicidal ideation, plan or intent  However he is depressed and anxious about how long he has waited for a kidney  He will begin to work with his new  to find out issues concerning his status  I provided mindfulness, support, encouragement and cognitive behavioral therapy strategies  Plan: Continue to help him navigate thru this difficult time  Behavioral Health Treatment Plan [facility]: Diagnosis and Treatment Plan explained to Edna Street relates understanding diagnosis and is agreeable to Treatment Plan   Yes

## 2022-02-08 ENCOUNTER — OFFICE VISIT (OUTPATIENT)
Dept: INTERNAL MEDICINE CLINIC | Facility: CLINIC | Age: 37
End: 2022-02-08
Payer: MEDICARE

## 2022-02-08 VITALS
HEART RATE: 75 BPM | SYSTOLIC BLOOD PRESSURE: 130 MMHG | TEMPERATURE: 97.8 F | RESPIRATION RATE: 16 BRPM | HEIGHT: 71 IN | BODY MASS INDEX: 30.1 KG/M2 | OXYGEN SATURATION: 99 % | DIASTOLIC BLOOD PRESSURE: 70 MMHG | WEIGHT: 215 LBS

## 2022-02-08 DIAGNOSIS — Z99.2 END STAGE RENAL DISEASE ON DIALYSIS DUE TO TYPE 1 DIABETES MELLITUS (HCC): ICD-10-CM

## 2022-02-08 DIAGNOSIS — R20.0 NUMBNESS OF ARM: ICD-10-CM

## 2022-02-08 DIAGNOSIS — E10.22 END STAGE RENAL DISEASE ON DIALYSIS DUE TO TYPE 1 DIABETES MELLITUS (HCC): ICD-10-CM

## 2022-02-08 DIAGNOSIS — E03.9 HYPOTHYROIDISM, UNSPECIFIED TYPE: Primary | ICD-10-CM

## 2022-02-08 DIAGNOSIS — N18.6 END STAGE RENAL DISEASE ON DIALYSIS DUE TO TYPE 1 DIABETES MELLITUS (HCC): ICD-10-CM

## 2022-02-08 PROCEDURE — 99214 OFFICE O/P EST MOD 30 MIN: CPT | Performed by: INTERNAL MEDICINE

## 2022-02-08 RX ORDER — LEVOTHYROXINE SODIUM 0.03 MG/1
25 TABLET ORAL DAILY
Qty: 30 TABLET | Refills: 1 | Status: SHIPPED | OUTPATIENT
Start: 2022-02-08

## 2022-02-08 NOTE — ASSESSMENT & PLAN NOTE
-with underlying DM1  -on HD MWF, switched to [facility]  -under evaluation for transplant at [facility]

## 2022-02-08 NOTE — PROGRESS NOTES
Assessment/Plan:    Problem List Items Addressed This Visit        Endocrine    End stage renal disease on dialysis due to type 1 diabetes mellitus ([facility] [address] )     -with underlying DM1  -on HD MWF, switched to [facility]  -under evaluation for transplant at [facility]         Relevant Medications    insulin aspart (NovoLOG) 100 units/mL injection    Hypothyroidism - Primary     -new  -obtain thyroid antibodies  Patient already with autoimmune disease with DM1  -start levothyroxine 25mcg daily with repeat TFTs in 6 weeks         Relevant Medications    levothyroxine 25 mcg tablet    Other Relevant Orders    Thyroid Antibodies Panel    TSH, 3rd generation with Free T4 reflex       Other    Numbness of arm     -BUE, occurs after prolonged immobility with sleeping and sitting for HD  -improved with PT/OT               Subjective:      Patient ID: Benigno Vega is a 43 y o  male  HPI  41yo male with DM1 with neuropathy, gastroparesis, ESRD on HD, pulmonary nodules, MDD, ELIAS, nonobstructive CAD, renovascular HTN with h/o CVA, anemia, vitamin D def here for follow up care  He is awaiting further evaluation for pulmonary HTN with V/Q scan and HST  He switched to [facility] on [address] and is much happier  Remains on same schedule on MWF  Had labs 12/23 that showed hypothyroidism  He completed PT/OT for UE numbness which is improved though not gone  He tries to be more active      The following portions of the patient's history were reviewed and updated as appropriate: allergies, current medications, past family history, past medical history, past social history, past surgical history and problem list       Current Outpatient Medications:     Admelog SoloStar 100 units/mL injection pen, Inject 6 Units under the skin 3 (three) times a day with meals , Disp: , Rfl:     amoxicillin (AMOXIL) 500 mg capsule, TAKE 4 CAPSULES BY MOUTH 1 HOUR PRIOR TO PROCEDURE, Disp: , Rfl:    aspirin (ECOTRIN LOW STRENGTH) 81 mg EC tablet, Take 81 mg by mouth daily, Disp: , Rfl:     atorvastatin (LIPITOR) 40 mg tablet, Take 1 tablet (40 mg total) by mouth every evening, Disp: 90 tablet, Rfl: 1    b complex vitamins capsule, Take 1 capsule by mouth daily before lunch , Disp: , Rfl:     B-D ULTRAFINE III SHORT PEN 31G X 8 MM MISC, USE 1 PEN NEEDLE 8 TIMES DAILY, Disp: 100 each, Rfl: 47    calcium acetate (PHOSLO) capsule, TAKE 3 CAPSULES BY MOUTH WITH MEALS, Disp: , Rfl:     cholecalciferol (VITAMIN D3) 1,000 units tablet, Take 1,000 Units by mouth daily, Disp: , Rfl:     cloNIDine (CATAPRES-TTS-3) 0 3 mg/24 hr, 1 patch once a week , Disp: , Rfl:     doxazosin (CARDURA) 2 mg tablet, TAKE 2 TABLETS BY MOUTH IN THE MORNING AND 2 IN THE EVENING (Patient taking differently: 2 mg daily ), Disp: 120 tablet, Rfl: 0    doxazosin (CARDURA) 8 MG tablet, Take 8 mg by mouth daily at bedtime, Disp: , Rfl:     escitalopram (LEXAPRO) 10 mg tablet, Take 1 tablet (10 mg total) by mouth daily, Disp: 90 tablet, Rfl: 1    famotidine (PEPCID) 40 MG tablet, Take 1 tablet (40 mg total) by mouth 2 (two) times a day, Disp: 60 tablet, Rfl: 5    gabapentin (NEURONTIN) 100 mg capsule, Take 2 tablets at bedtime, Disp: 60 capsule, Rfl: 5    GLUCAGON EMERGENCY 1 MG injection, INJECT 1MG SUBCUTANEOUSLY AS NEEDED (AS DIRECTED)   MAY REPEAT DOSE EVERY 20 MINUTES AS NEEDED, Disp: , Rfl: 3    hydrALAZINE (APRESOLINE) 100 MG tablet, Take 0 5 tablets (50 mg total) by mouth 2 (two) times a day, Disp: 90 tablet, Rfl: 1    hydrOXYzine HCL (ATARAX) 10 mg tablet, Take 1 tablet (10 mg total) by mouth every 6 (six) hours as needed for itching, Disp: 30 tablet, Rfl: 0    insulin aspart (NovoLOG) 100 units/mL injection, Inject 25 Units under the skin Sliding scale, Disp: , Rfl:     insulin glargine (Basaglar KwikPen) 100 units/mL injection pen, Inject 14 Units under the skin daily at bedtime (Patient taking differently: Inject 5 Units under the skin daily at bedtime ), Disp: , Rfl:     labetalol (NORMODYNE) 200 mg tablet, Take 2 tablets (400 mg total) by mouth 2 (two) times a day (Patient taking differently: Take 400 mg by mouth 3 (three) times a day ), Disp: 30 tablet, Rfl: 0    lisinopril (ZESTRIL) 20 mg tablet, Take 1 tablet (20 mg total) by mouth daily, Disp: 30 tablet, Rfl: 0    Lokelma 5 g PACK, see administration instructions Once daily on non dialysis days, Disp: , Rfl:     metolazone (ZAROXOLYN) 10 mg tablet, Take 5 mg by mouth daily , Disp: , Rfl:     minoxidil (LONITEN) 2 5 mg tablet, Take 2 5 mg by mouth 2 (two) times a day , Disp: , Rfl:     NIFEdipine ER (ADALAT CC) 60 MG 24 hr tablet, Take 1 tablet (60 mg total) by mouth 2 (two) times a day, Disp: 180 tablet, Rfl: 0    ondansetron (ZOFRAN) 4 mg tablet, Take 4 mg by mouth every 8 (eight) hours as needed for nausea or vomiting, Disp: , Rfl:     saccharomyces boulardii (FLORASTOR) 250 mg capsule, Take 1 capsule (250 mg total) by mouth 2 (two) times a day (Patient taking differently: Take 250 mg by mouth daily ), Disp: 60 capsule, Rfl: 0    torsemide (DEMADEX) 100 mg tablet, Take 100 mg by mouth 2 (two) times a day , Disp: , Rfl:     traZODone (DESYREL) 50 mg tablet, Take 1 tablet (50 mg total) by mouth daily at bedtime as needed for sleep, Disp: 30 tablet, Rfl: 1    cinacalcet (SENSIPAR) 60 MG tablet, Take 60 mg by mouth daily Non-dialysis days (Patient not taking: Reported on 10/5/2021), Disp: , Rfl:     insulin lispro (HumaLOG) 100 units/mL injection, Inject 4 Units under the skin 3 (three) times a day with meals (Patient not taking: Reported on 10/5/2021), Disp: 10 mL, Rfl: 0    insulin NPH (HumuLIN N,NovoLIN N) 100 Units/mL subcutaneous injection, Inject 6 Units under the skin daily at bedtime (Patient not taking: Reported on 2/8/2022 ), Disp: , Rfl:     levothyroxine 25 mcg tablet, Take 1 tablet (25 mcg total) by mouth daily, Disp: 30 tablet, Rfl: 1    Review of Systems   Constitutional: Negative for activity change, appetite change and unexpected weight change  HENT: Negative for postnasal drip and rhinorrhea  Respiratory: Negative for cough, chest tightness, shortness of breath and wheezing  Cardiovascular: Negative for chest pain, palpitations and leg swelling  Neurological: Positive for dizziness and numbness  Negative for headaches  Objective:    /70 (BP Location: Left arm, Patient Position: Sitting)   Pulse 75   Temp 97 8 °F (36 6 °C)   Resp 16   Ht 5' 11" (1 803 m)   Wt 97 5 kg (215 lb)   SpO2 99%   BMI 29 99 kg/m²      Physical Exam  Vitals reviewed  Constitutional:       General: He is not in acute distress  Neck:      Thyroid: No thyromegaly or thyroid tenderness  Cardiovascular:      Rate and Rhythm: Normal rate and regular rhythm  Pulses: Normal pulses  Heart sounds: Normal heart sounds  Pulmonary:      Effort: Pulmonary effort is normal  No respiratory distress  Breath sounds: Normal breath sounds  No wheezing  Musculoskeletal:      Cervical back: Neck supple  Right lower leg: No edema  Left lower leg: No edema  Lymphadenopathy:      Cervical: No cervical adenopathy  Neurological:      Mental Status: He is alert and oriented to person, place, and time  Psychiatric:         Attention and Perception: Attention normal          Mood and Affect: Mood normal          Speech: Speech normal          Behavior: Behavior is cooperative

## 2022-02-08 NOTE — ASSESSMENT & PLAN NOTE
-with underlying DM1  -on HD MWF, switched to [facility]  -under evaluation for transplant at [place]

## 2022-02-08 NOTE — ASSESSMENT & PLAN NOTE
-new    -obtain thyroid antibodies    Patient already with autoimmune disease with DM1  -start levothyroxine 25mcg daily with repeat TFTs in 6 weeks

## 2022-02-20 DIAGNOSIS — I15.0 RENOVASCULAR HYPERTENSION: ICD-10-CM

## 2022-02-21 RX ORDER — LABETALOL 200 MG/1
TABLET, FILM COATED ORAL
Qty: 180 TABLET | Refills: 0 | Status: SHIPPED | OUTPATIENT
Start: 2022-02-21

## 2022-02-22 ENCOUNTER — OFFICE VISIT (OUTPATIENT)
Dept: PSYCHIATRY | Facility: CLINIC | Age: 37
End: 2022-02-22
Payer: MEDICARE

## 2022-02-22 DIAGNOSIS — F41.1 GENERALIZED ANXIETY DISORDER: Primary | ICD-10-CM

## 2022-02-22 DIAGNOSIS — F33.0 MILD EPISODE OF RECURRENT MAJOR DEPRESSIVE DISORDER (HCC): ICD-10-CM

## 2022-02-22 DIAGNOSIS — F51.04 PSYCHOPHYSIOLOGICAL INSOMNIA: ICD-10-CM

## 2022-02-22 PROCEDURE — 90833 PSYTX W PT W E/M 30 MIN: CPT | Performed by: STUDENT IN AN ORGANIZED HEALTH CARE EDUCATION/TRAINING PROGRAM

## 2022-02-22 PROCEDURE — 99214 OFFICE O/P EST MOD 30 MIN: CPT | Performed by: STUDENT IN AN ORGANIZED HEALTH CARE EDUCATION/TRAINING PROGRAM

## 2022-02-22 NOTE — PSYCH
MEDICATION MANAGEMENT NOTE        [facility]      Name and Date of Birth: Swati Mckeon 43 y o  1985 MRN: [id number]    Date of Visit: February 22, 2022    Reason for Visit: Follow-up visit for medication management     SUBJECTIVE:    Stephanie Montana is a 43 y o  male with past psychiatric history significant for major depressive disorder (mild), generalized anxiety, cannabis use (medical) and insomnia  who was personally seen and evaluated today at the [address] [place] [address] E outpatient clinic for follow-up and medication management  Jennifer Wolff presents as calm, pleasant, and cooperative  His thoughts are organized and linear  He completes assessment without difficulty  Isabelle Phillip remains on PRN Hydroxyzine and Lexapro 10mg Daily  He is tolerated these medications well without adverse side effects  He is using PRN Hydroxyzine sparingly  Following last visit, Isabelle Phillip was started on PRN Trazodone 25mg QHS for sleep  He reports significant improvement in insomnia with this agent  At times, he does endorse AM fatigue but this is likely a result of him taking the agent at "12:30AM"  He was encouraged to move back time of ingestion to 9PM, which will help to mitigate AM "hangover" effect  Acutely, Paulo endorses psychiatric mood stability  He is pleased to share that he was moved to "Active status" on [facility] renal transplant list  He he has been "on the list" since 2018 but was not active  As such, if he kidney becomes available, he could receive a transplant within the next 6 months  He is optimistic regarding this and discussion was held today regarding appropriate expectations  He does report some frustration regarding not being placed on list for pancreatic transplant and supportive therapy provided  Acutely, Paulo's sleep is appropriate  His appetite is very limited  He cannot identify a trigger for this   He does report thyroid abnormalities which could be the catalyst for current fatigue, amotivation, and diminished appetite  He is set to get updated blood work and follow-up with ENDO regarding need for synthroid  Angy Rao is without lethality concern  He denies SI/HI  He is future-oriented and demonstrates self-preservation  He continues to benefit from individual therapy with Peola Iesha  Angy Rao is more hopeful and pleasant  His concentration/focus is at baseline  He likes his new dialysis center and feels respected and validated  He is planning to write a letter to his old center regarding his experience of which he was not happy  Angy Rao is currently without overwhelming or debilitating anxiety  He denies new-onset panic symptoms  He is not tense or on-edge today  During today's examination, Harley Montaño does not exhibit objective evidence of aziza/hypomania or caprice psychosis  Harley Montaño denies recent ETOH or illicit substance abuse  He denies need for further medication intervention  He offers no further concerns  Current Rating Scores:     None completed today      Review Of Systems:      Constitutional feeling tired, low energy and as noted in HPI   ENT negative   Cardiovascular negative   Respiratory negative   Gastrointestinal negative   Genitourinary negative   Musculoskeletal negative   Integumentary negative   Neurological negative   Endocrine negative   Other Symptoms none, all other systems are negative       Past Psychiatric History: (unchanged information from previous note copied and italicized) - Information that is bolded has been updated       Inpatient psychiatric admissions: Denies  Prior outpatient psychiatric linkage: Denies  Past/current psychotherapy: Currently linked with Ju Alves  History of suicidal attempts/gestures: Denies  History of violence/aggressive behaviors: Denies  Psychotropic medication trials: Lexapro (3 years ago), Zoloft, Trazodone (now)  Substance abuse inpatient/outpatient rehabilitation: Denies     Substance Abuse History: (unchanged information from previous note copied and italicized) - Information that is bolded has been updated       No history of ETOH or illict substance abuse  Shey Leo does endorse previous tobacco abuse and current medical cannabis prescription  No past legal actions or arrests secondary to substance intoxication  The patient denies prior DWIs/DUIs  No history of outpatient/inpatient rehabilitation programs  Mateus does not exhibit objective evidence of substance withdrawal during today's examination nor does Kortney Sykesll under the influence of any psychoactive substance           Social History: (unchanged information from previous note copied and italicized) - Information that is bolded has been updated       Developmental: Denies a history of milestone/developmental delay  Denies a history of in-utero exposure to toxins/illicit substances  There is no documented history of IEP or need for special education  He was born and raised in [place], [place]  He lived there for 27 years  He moved to the [facility] in 2011 to live with his sister and work as    His parents have since relocated and he now lives with them    [address] [employer] - [address] [place] [address] class of 2006 - degree in theatre   Marital history: single  Living arrangement, social support: parents - has a sister who has children (nephews and nieces)   Occupational History: on permanent disability  Access to firearms: Denies direct access to weapons/firearms  Mateus CLOTILDE Casaskyung has no history of arrests or violence with a deadly weapon       Traumatic History: (unchanged information from previous note copied and italicized) - Information that is bolded has been updated      Abuse:none is reported  Other Traumatic Events: 2 CVAs - 2015 and 2018      Past Medical History:    Past Medical History:   Diagnosis Date    Acute kidney injury ([address] )     Ambulates with cane     Anuria     Anxiety     Cellulitis of right elbow 3/31/2021    Chronic kidney disease     Depression     Diabetes mellitus ([address] )     Diarrhea     Emesis 10/24/2020    End stage renal disease ([address] ) 2/11/2018    Formatting of this note might be different from the original  Last Assessment & Plan:  Secondary to DM  On nightly PD  Followed by [facility]  Patient considering transplant for kidney and pancreas through [address] of this note might be different from the original  Last Assessment & Plan:  Formatting of this note might be different from the original  Lab Results  Component Value Date   EGFR     Falls     Gastroparesis     GERD (gastroesophageal reflux disease)     History of shingles 2010    History of transfusion 02/2018    no adverse reaction    Hyperlipidemia     Hyperphosphatemia     Hypertension     Itching     Muscle weakness     general unsteadiness    Obesity (BMI 30 0-34 9) 9/9/2019    PONV (postoperative nausea and vomiting) 1/26/2018    Recurrent peritonitis ([address] ) due to peritoneal dialysis catheter 7/31/2020    Retinopathy     Seizures ([address] )     early 2020 - one time    Skin abnormality     some dime size areas where skin was scratched from itching    Spontaneous bacterial peritonitis ([address] ) 10/19/2020    Stroke ([facility] [address] )     x2 - off balance/no driving/fatigue    Swelling of both lower extremities     Vomiting     Wears glasses      No past medical history pertinent negatives    Past Surgical History:   Procedure Laterality Date    CARDIAC LOOP RECORDER  05/2018    COLONOSCOPY      EGD      EYE SURGERY Right     IR AV FISTULAGRAM/GRAFTOGRAM  2/23/2021    IR TUNNELED CENTRAL LINE PLACEMENT  2/16/2021    IR TUNNELED DIALYSIS CATHETER PLACEMENT  11/18/2020    IR TUNNELED DIALYSIS CATHETER REMOVAL  2/12/2021    IR TUNNELED DIALYSIS CATHETER REMOVAL  3/11/2021    PERITONEAL CATHETER INSERTION N/A 8/27/2018    Procedure: UNROOF PD CATHETER;  Surgeon: Aleyda Rosario DO;  [facility];  Service: General    IN ANASTOMOSIS,AV,ANY SITE Left 2020    Procedure: CREATION FISTULA  ARTERIOVENOUS (AV) - LEFT WRIST;  Surgeon: Nathan Rodriguez MD;  [facility];  Service: Vascular    NE ESOPHAGOGASTRODUODENOSCOPY TRANSORAL DIAGNOSTIC N/A 2019    Procedure: ESOPHAGOGASTRODUODENOSCOPY (EGD); Surgeon: Ninfa Gonsales MD;  Location: [facility];   Service: Gastroenterology    NE LAP INSERTION TUNNELED INTRAPERITONEAL CATHETER N/A 2018    Procedure: LAPAROSCOPIC PD CATHETER PLACEMENT;  Surgeon: Branden Casanova DO;  [facility];  Service: General    NE REMOVAL TUNNELED INTRAPERITONEAL CATHETER N/A 2020    Procedure: REMOVAL CATHETER PERITONEAL DIALYSIS;  Surgeon: Chelle Dent MD;  [facility];  Service: General    TONSILLECTOMY      UPPER GASTROINTESTINAL ENDOSCOPY       Allergies   Allergen Reactions    Sulfa Antibiotics Rash       Substance Abuse History:    Social History     Substance and Sexual Activity   Alcohol Use Not Currently     Social History     Substance and Sexual Activity   Drug Use Yes    Types: Marijuana    Comment: medical marijuana       Social History:    Social History     Socioeconomic History    Marital status: Single     Spouse name: Not on file    Number of children: Not on file    Years of education: Not on file    Highest education level: Not on file   Occupational History    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   Tobacco Use    Smoking status: Former Smoker     Packs/day: 0 50     Years: 12 00     Pack years: 6 00     Types: Cigarettes     Quit date: 2018     Years since quittin 0    Smokeless tobacco: Never Used    Tobacco comment: quit 2018   Vaping Use    Vaping Use: Every day    Substances: THC, CBD   Substance and Sexual Activity    Alcohol use: Not Currently    Drug use: Yes     Types: Marijuana     Comment: medical marijuana    Sexual activity: Not on file   Other Topics Concern    Not on file   Social History Narrative    Caffeine use    single     Social Determinants of Health     Financial Resource Strain: Not on file   Food Insecurity: Not on file   Transportation Needs: Not on file   Physical Activity: Not on file   Stress: Not on file   Social Connections: Not on file   Intimate Partner Violence: Not on file   Housing Stability: Not on file       Family Psychiatric History:     Family History   Problem Relation Age of Onset    Breast cancer Mother     Hypertension Mother     Hyperlipidemia Father     Hypertension Father     Leukemia Maternal Grandmother     Hyperlipidemia Maternal Grandfather     Hypertension Maternal Grandfather     Hyperlipidemia Paternal Grandmother     Hypertension Paternal Grandmother     Heart disease Paternal Grandfather         cardiac disorder    Diabetes Paternal Grandfather        History Review: The following portions of the patient's history were reviewed and updated as appropriate: allergies, current medications, past family history, past medical history, past social history, past surgical history and problem list          OBJECTIVE:     Vital signs in last 24 hours: There were no vitals filed for this visit      Mental Status Evaluation:    Appearance age appropriate, casually dressed, dressed appropriately, looks stated age   Behavior pleasant, cooperative, calm, good eye contact   Speech normal rate, normal volume, normal pitch, fluent   Mood euthymic   Affect normal range and intensity, appropriate   Thought Processes organized, logical, goal directed   Associations intact associations   Thought Content no overt delusions   Perceptual Disturbances: no auditory hallucinations, no visual hallucinations   Abnormal Thoughts  Risk Potential Suicidal ideation - None at present  Homicidal ideation - None at present  Potential for aggression - No   Orientation oriented to person, place, time/date and situation   Memory recent and remote memory grossly intact   Consciousness alert and awake   Attention Span Concentration Span attention span and concentration are age appropriate   Intellect appears to be of average intelligence   Insight intact and good   Judgement intact and good   Muscle Strength and  Gait unstable gait, uses cane   Motor activity no abnormal movements   Language no difficulty naming common objects   Fund of Knowledge adequate knowledge of current events   Pain none   Pain Scale 0       Laboratory Results: I have personally reviewed all pertinent laboratory/tests results    Recent Labs (last 6 months):   Orders Only on 01/19/2022   Component Date Value    SARS-CoV-2 01/19/2022 Negative    Lab Requisition on 01/05/2022   Component Date Value    Hep B Core Total Ab 01/05/2022 Non-reactive     Hep B S Ab 01/05/2022 465 78     Hepatitis B Surface Ag 01/05/2022 Non-reactive    Admission on 01/05/2022, Discharged on 01/05/2022   Component Date Value    Ventricular Rate 01/05/2022 86     Atrial Rate 01/05/2022 86     AZ Interval 01/05/2022 178     QRSD Interval 01/05/2022 70     QT Interval 01/05/2022 398     QTC Interval 01/05/2022 476     P Axis 01/05/2022 67     QRS Axis 01/05/2022 75     T Wave [place] 01/05/2022 74    Appointment on 12/23/2021   Component Date Value    WBC 12/23/2021 5 05     RBC 12/23/2021 3 88     Hemoglobin 12/23/2021 12 3     Hematocrit 12/23/2021 37 9     MCV 12/23/2021 98     MCH 12/23/2021 31 7     MCHC 12/23/2021 32 5     RDW 12/23/2021 13 7     Platelets 89/02/1970 246     MPV 12/23/2021 9 4     Sodium 12/23/2021 141     Potassium 12/23/2021 4 6     Chloride 12/23/2021 101     CO2 12/23/2021 29     ANION GAP 12/23/2021 11     BUN 12/23/2021 33*    Creatinine 12/23/2021 8 14*    Glucose, Fasting 12/23/2021 191*    Calcium 12/23/2021 9 1     AST 12/23/2021 18     ALT 12/23/2021 21     Alkaline Phosphatase 12/23/2021 105     Total Protein 12/23/2021 7 0     Albumin 12/23/2021 3 9     Total Bilirubin 12/23/2021 0 66     eGFR 12/23/2021 7     NT-proBNP 12/23/2021 7,339*    TSH 3RD GENERATON 12/23/2021 4 161*    Free T4 12/23/2021 0 74*    Hep B Core Total Ab 12/23/2021 Non-reactive     Hepatitis B Surface Ag 12/23/2021 Non-reactive     Hep B S Ab 12/23/2021 426 80     Hepatitis C Ab 12/23/2021 Non-reactive     Protime 12/23/2021 13 2     INR 12/23/2021 1 00    Lab Requisition on 11/03/2021   Component Date Value    Potassium 11/03/2021 6 0*   Lab Requisition on 09/27/2021   Component Date Value    WBC 09/27/2021 6 28     RBC 09/27/2021 3 19*    Hemoglobin 09/27/2021 10 3*    Hematocrit 09/27/2021 31 4*    MCV 09/27/2021 98     MCH 09/27/2021 32 3     MCHC 09/27/2021 32 8     RDW 09/27/2021 14 6     MPV 09/27/2021 9 7     Platelets 71/10/6679 258     nRBC 09/27/2021 0     Neutrophils Relative 09/27/2021 60     Immat GRANS % 09/27/2021 1     Lymphocytes Relative 09/27/2021 17     Monocytes Relative 09/27/2021 8     Eosinophils Relative 09/27/2021 13*    Basophils Relative 09/27/2021 1     Neutrophils Absolute 09/27/2021 3 82     Immature Grans Absolute 09/27/2021 0 03     Lymphocytes Absolute 09/27/2021 1 08     Monocytes Absolute 09/27/2021 0 47     Eosinophils Absolute 09/27/2021 0 81*    Basophils Absolute 09/27/2021 0 07     Potassium 09/27/2021 5 6*       Suicide/Homicide Risk Assessment:    The following interventions are recommended: no intervention changes needed      Lethality Statement:    Based on today's assessment and clinical criteria, Brain Whyte contracts for safety and is not an imminent risk of harm to self or others  Outpatient level of care is deemed appropriate at this current time  So Sherman understands that if they can no longer contract for safety, they need to call the office or report to their nearest Emergency Room for immediate evaluation        Assessment/Plan:     Brain Whyte is a 43 y o  male with past psychiatric history significant for major depressive disorder (mild), generalized anxiety, cannabis use (medical) and insomnia who was personally seen and evaluated today at the [address] [place] [address] E outpatient clinic for follow-up and medication management  Bill endorses an extensive medical history complicated by 2 cerebral vascular accidents in 2015 and 2018  Olive Faust states that the etiology of these CVAs is unknown but suspected to be secondary to unmanaged diabetes mellitus, hypertension, and history of nicotine use  Olive Faust states that his second CVA in 2018 was far more debilitating than the first and result in significant impairment in occupational and social functionality  He now ambulates slowly with a walking cane and is on disability  He is no longer independent and requires transportation assistance via family  He also had to move back in with his parents which has been distressing  Over the last few years, Olive Faust states that he was placed on the [facility] renal transplant list and was awaiting a harvested kidney  In October 2020, in the context of familial discord and recent verbal disagreement with mother, Olive Faust voiced vague and fleeting thoughts of self-harm to his dialysis nurse  She was perturbed by these statements and referred him to his PCP  While being evaluated by his PCP, Olive Faust was then sent to the ED where he was ultimately given psychiatric resources in the community and discharged home  Unfortunately, as a result of this incident, Olive Faust was removed from the transplant list for "1 year"  This is particularly disconcerting as Olive Faust did not actually harm himself or engage in any self-destructive behavior  He was transparent with his treatment team and emotional raw after a disagreement with his mother  By undergoing dialysis at the exact moment he voiced these concerns to his nurse, he was seeking treatment and thus, acting in a self-preserving manner, suggestive of a will to live   Olive Faust is frustrated regarding being removed from the renal transplant process and is seeking transplant via [facility]  His frustration regarding this process again, is representative of future-orientation and a will to live        Historically, Mateus denies most neurovegetative symptomatology suggestive of major depressive disorder or dysthymia  Mateus does admit to fragmented or non-restorative sleep that ultimately contributes to anergia and amotivation  He has lost his sense of connectivity and purpose as a result of his CVAs and physical limitations, however, he is engaging in therapy and now medication management to regain these  Mateus adamantly denies acute thoughts of suicide or self-harm  Gaston Nugent denies acute anxiety that is overwhelming but does repot historical symptomatology suggestive of pathologic/overwhelming anxiety  He does not experience daily or weekly panic attacks  He admits to mild nervousness and fearfulness regarding his health, which is appropriate  He does not feel on-edge, restless, or perpetually irritable  Mateus vehemently denies any acute or chronic history suggestive of an underlying affective (bipolar) organization  illearle denies historical symptomatology suggestive of an underlying psychotic process       Today's Plan:    Psychopharmacologically, Gaston Nugent is tolerating Lexapro 10mg, PRN Trazodone 25mg QHS, and PRN Hydroxyzine well without adverse side effects  He reports appropriate control of mood and anxiety at the moment   As such, no acute intervention is warranted        DSM-V Diagnoses:      1 ) Major depressive disorder (mild)  2 ) Generalized Anxiety Disorder  3 ) Medical Cannabis Use  4 ) Insomnia        Treatment Recommendations/Precautions:        1 ) Major depressive disorder (mild)  - Continue Lexapro 10mg Daily   - Continue engagement in individual psychotherapy with Isidro Becker  - Psychoeducation provided regarding the importance of exercise and healthy dietary choices and their impact on mood, energy, and motivation  - Counseled to avoid ETOH, illict substances, and nicotine secondary to the detrimental effects of these substances on mental and physical health  - Encouraged to engage in non-verbal forms of therapy such as art therapy, music therapy, and mindfulness  - Discussed the bio-psycho-social model to treatment and therapeutic exercises/interventions were attempted to cognitively restructure thoughts     2 ) Generalized Anxiety Disorder  - Continue Lexapro 10mg Daily  - Continue PRN Hydroxyzine 10mg         3  ) Medical Cannabis Use  - Counseled to avoid ETOH, illict substances, and nicotine secondary to the detrimental effects of these substances on mental and physical health  - No concerns for misuse      4 ) Insomnia   - Continue Trazodone 25mg QHS PRN       Medication management every 3 months  Continue psychotherapy with [facility] therapist Omega Ma  Aware of need to follow up with family physician for medical issues  Aware of 24 hour and weekend coverage for urgent situations accessed by calling [facility] main practice number    Medications Risks/Benefits      Risks, Benefits And Possible Side Effects Of Medications:    Risks, benefits, and possible side effects of medications explained to Jennifer Wolff including risk of suicidality and serotonin syndrome related to treatment with antidepressants  He verbalizes understanding and agreement for treatment  Controlled Medication Discussion:     Not applicable - controlled prescriptions are not prescribed by this practice    Psychotherapy Provided:     Individual psychotherapy provided: Yes  Counseling was provided during the session today for 17 minutes    Medication education provided to Jennifer Wolff   Recent stressor including health issues, medical problems, recent medication change, everyday stressors and occasional anxiety discussed with Jennifer Wolff    Coping strategies including compliance with medications, eliminating avoidance, getting into a good routine, maintain healthy diet, maintain heathy sleeping hygiene, maintain positive attitude, stress reduction, spending time with family and talking to a therapist reviewed with Mani Pelaez    Educated on importance of medication and treatment compliance  Importance of follow up with family physician for medical issues reviewed with Mani Pelaez   Supportive therapy provided  Treatment Plan:    Completed and signed during the session: Not applicable - Treatment Plan to be completed by [address] [place] [address] E therapist    Note Share Disclaimer:      This note was not shared with the patient due to reasonable likelihood of causing patient harm    Joanna Mobley MD 02/22/22

## 2022-02-24 ENCOUNTER — HOSPITAL ENCOUNTER (OUTPATIENT)
Dept: NUCLEAR MEDICINE | Facility: HOSPITAL | Age: 37
Discharge: HOME | End: 2022-02-24
Payer: MEDICARE

## 2022-02-24 ENCOUNTER — HOSPITAL ENCOUNTER (OUTPATIENT)
Dept: RADIOLOGY | Facility: HOSPITAL | Age: 37
Discharge: HOME | End: 2022-02-24
Payer: MEDICARE

## 2022-02-24 DIAGNOSIS — I27.20 PULMONARY HYPERTENSION (HCC): ICD-10-CM

## 2022-02-24 PROCEDURE — G1004 CDSM NDSC: HCPCS

## 2022-02-24 PROCEDURE — 71046 X-RAY EXAM CHEST 2 VIEWS: CPT

## 2022-02-24 PROCEDURE — 78580 LUNG PERFUSION IMAGING: CPT

## 2022-02-24 PROCEDURE — A9540 TC99M MAA: HCPCS

## 2022-03-03 ENCOUNTER — SOCIAL WORK (OUTPATIENT)
Dept: BEHAVIORAL/MENTAL HEALTH CLINIC | Facility: CLINIC | Age: 37
End: 2022-03-03
Payer: MEDICARE

## 2022-03-03 DIAGNOSIS — F41.1 GENERALIZED ANXIETY DISORDER: ICD-10-CM

## 2022-03-03 DIAGNOSIS — F33.0 MILD EPISODE OF RECURRENT MAJOR DEPRESSIVE DISORDER (HCC): Primary | ICD-10-CM

## 2022-03-03 PROCEDURE — 90834 PSYTX W PT 45 MINUTES: CPT | Performed by: SOCIAL WORKER

## 2022-03-03 NOTE — PSYCH
Psychotherapy Provided: Individual Psychotherapy 45 minutes     Length of time in session: 45 minutes, follow up in 2 week    ELIAS, MDDR, Mild    Goals addressed in session: Goal 1, Goal 2 and Goal 3      Pain:      moderate to severe    3    Current suicide risk : Low     Data: Paulo arrived for his session  He is on the list for a Kidney thru [facility]  He was only wanting a kidney that is not being used but he is now open to take a kidney from someone who is willing to donate one  He did share he may have an aunt who is willing to give him a kidney  He discussed how he is managing his depression and his anxiety over all of his health issues  He did discuss how he and his parents are getting along  He feels more positive about his current dialysis center  Assessment: Olive Faust denies suicidal/homicidal ideation, plan or intent  However he is depressed and anxious over his multiple health issues and the roadblocks he has encountered  I continue to provide support, encouragement and mindfulness strategies to help him cope  Plan: Continue to help him skill build in distress tolerance       Behavioral Health Treatment Plan [facility]: Diagnosis and Treatment Plan explained to Alexandre Blanco relates understanding diagnosis and is agreeable to Treatment Plan  yes

## 2022-03-22 ENCOUNTER — TELEPHONE (OUTPATIENT)
Dept: PSYCHIATRY | Facility: CLINIC | Age: 37
End: 2022-03-22

## 2022-03-22 DIAGNOSIS — R79.89 AZOTEMIA: ICD-10-CM

## 2022-03-22 DIAGNOSIS — G35 OPTIC NEURITIS DUE TO MULTIPLE SCLEROSIS (HCC): ICD-10-CM

## 2022-03-22 DIAGNOSIS — I16.0 HYPERTENSIVE URGENCY: ICD-10-CM

## 2022-03-22 DIAGNOSIS — E72.11 HYPERHOMOCYSTEINEMIA (HCC): ICD-10-CM

## 2022-03-22 DIAGNOSIS — N18.2 ACUTE RENAL FAILURE WITH ACUTE TUBULAR NECROSIS SUPERIMPOSED ON STAGE 2 CHRONIC KIDNEY DISEASE (HCC): ICD-10-CM

## 2022-03-22 DIAGNOSIS — I63.9 CEREBROVASCULAR ACCIDENT (CVA), UNSPECIFIED MECHANISM (HCC): ICD-10-CM

## 2022-03-22 DIAGNOSIS — N18.30 ACUTE RENAL FAILURE WITH ACUTE TUBULAR NECROSIS SUPERIMPOSED ON STAGE 3 CHRONIC KIDNEY DISEASE (HCC): ICD-10-CM

## 2022-03-22 DIAGNOSIS — N17.0 ACUTE RENAL FAILURE WITH ACUTE TUBULAR NECROSIS SUPERIMPOSED ON STAGE 2 CHRONIC KIDNEY DISEASE (HCC): ICD-10-CM

## 2022-03-22 DIAGNOSIS — R11.0 NAUSEA: ICD-10-CM

## 2022-03-22 DIAGNOSIS — H46.9 OPTIC NEURITIS DUE TO MULTIPLE SCLEROSIS (HCC): ICD-10-CM

## 2022-03-22 DIAGNOSIS — E83.39 HYPERPHOSPHATEMIA: ICD-10-CM

## 2022-03-22 DIAGNOSIS — E55.9 VITAMIN D DEFICIENCY: ICD-10-CM

## 2022-03-22 DIAGNOSIS — E10.21 TYPE 1 DIABETES MELLITUS WITH DIABETIC NEPHROPATHY, WITH LONG-TERM CURRENT USE OF INSULIN (HCC): Chronic | ICD-10-CM

## 2022-03-22 DIAGNOSIS — R80.1 PERSISTENT PROTEINURIA: ICD-10-CM

## 2022-03-22 DIAGNOSIS — H53.8 BLURRED VISION: ICD-10-CM

## 2022-03-22 DIAGNOSIS — Z86.73 HISTORY OF LACUNAR CEREBROVASCULAR ACCIDENT (CVA): ICD-10-CM

## 2022-03-22 DIAGNOSIS — N17.0 ACUTE RENAL FAILURE WITH ACUTE TUBULAR NECROSIS SUPERIMPOSED ON STAGE 3 CHRONIC KIDNEY DISEASE (HCC): ICD-10-CM

## 2022-03-22 DIAGNOSIS — H51.0 BINOCULAR VISION DISORDER WITH CONJUGATE GAZE PALSY: ICD-10-CM

## 2022-03-22 DIAGNOSIS — I63.9 CEREBROVASCULAR ACCIDENT (CVA) OF LEFT PONTINE STRUCTURE (HCC): ICD-10-CM

## 2022-03-22 DIAGNOSIS — H53.30 BINOCULAR VISUAL DISTURBANCE: ICD-10-CM

## 2022-03-22 NOTE — TELEPHONE ENCOUNTER
Continuity of Care Form    Patient Name: Neo Comer   :  1972  MRN:  [id number]    Admit date:  2019  Discharge date:  ***    Code Status Order: Full Code   Advance Directives:   Advance Care Flowsheet Documentation     Date/Time Healthcare Directive Type of Healthcare Directive Copy in [address] Agent's Name Healthcare Agent's Phone Number    [phone]  No, patient does not have an advance directive for healthcare treatment -- -- -- -- --          Admitting Physician:  Gladys Gann MD  PCP: No primary care provider on file. Discharging Nurse: [facility] Unit/Room#: A8S-8303/4121-01  Discharging Unit Phone Number: ***    Emergency Contact:   Extended Emergency Contact Information  Primary Emergency Contact: Jc Eubanks  Home Phone: [phone]  Relation: Other  Secondary Emergency Contact: [address] [place] [address] Phone: [phone]  Relation: Brother/Sister    Past Surgical History:  Past Surgical History:   Procedure Laterality Date    AXILLARY-BRACHIAL BYPASS GRAFT Left     HERNIA REPAIR      PARATHYROIDECTOMY         Immunization History: There is no immunization history on file for this patient.     Active Problems:  Patient Active Problem List   Diagnosis Code    HTN (hypertension), malignant I10    ESRD (end stage renal disease) on dialysis ([facility] N18.6, Z99.2    Seizure disorder ([facility]) G40.909    Restless legs syndrome (RLS) G25.81    Chronic pancreatitis ([facility]) K86.1    Anemia D64.9    Anemia of chronic disease D63.8    Antiphospholipid antibody syndrome ([facility] D68.61    Atypical chest pain R07.89    C. difficile diarrhea A04.72    CHF (congestive heart failure) ([facility]) I50.9    Chronic pain disorder G89.4    Closed bimalleolar fracture of left ankle S82.842A    Elevated blood pressure reading R03.0    ESRD on hemodialysis ([facility]) N18.6, Z99.2    End-stage renal disease on hemodialysis ([facility]) N18.6, Z99.2    Requested medication(s) are due for refill today: Yes  Patient has already received a courtesy refill: No  Other reason request has been forwarded to provider: SECTION    Prognosis: {Prognosis:5690907253}    Condition at Discharge: Dylan Mota Patient Condition:780664740}    Rehab Potential (if transferring to Rehab): {Prognosis:1546941392}    Recommended Labs or Other Treatments After Discharge: ***    Physician Certification: I certify the above information and transfer of Rafa Guallpa  is necessary for the continuing treatment of the diagnosis listed and that he requires {Admit to Appropriate Level of Care:56239} for {GREATER/LESS:267523410} 30 days.      Update Admission H&P: {CHP DME Changes in BZSWZ:770151082}    PHYSICIAN SIGNATURE:  {Esignature:078307382}

## 2022-03-23 RX ORDER — ATORVASTATIN CALCIUM 40 MG/1
40 TABLET, FILM COATED ORAL EVERY EVENING
Qty: 90 TABLET | Refills: 1 | Status: SHIPPED | OUTPATIENT
Start: 2022-03-23

## 2022-03-28 DIAGNOSIS — F33.0 MILD EPISODE OF RECURRENT MAJOR DEPRESSIVE DISORDER (HCC): ICD-10-CM

## 2022-03-28 DIAGNOSIS — F41.1 GENERALIZED ANXIETY DISORDER: ICD-10-CM

## 2022-03-28 RX ORDER — ESCITALOPRAM OXALATE 10 MG/1
10 TABLET ORAL DAILY
Qty: 90 TABLET | Refills: 1 | Status: SHIPPED | OUTPATIENT
Start: 2022-03-28 | End: 2022-07-12

## 2022-04-19 ENCOUNTER — SOCIAL WORK (OUTPATIENT)
Dept: BEHAVIORAL/MENTAL HEALTH CLINIC | Facility: CLINIC | Age: 37
End: 2022-04-19
Payer: MEDICARE

## 2022-04-19 ENCOUNTER — REMOTE DEVICE CLINIC VISIT (OUTPATIENT)
Dept: CARDIOLOGY CLINIC | Facility: CLINIC | Age: 37
End: 2022-04-19
Payer: MEDICARE

## 2022-04-19 DIAGNOSIS — F41.1 GENERALIZED ANXIETY DISORDER: ICD-10-CM

## 2022-04-19 DIAGNOSIS — F33.0 MILD EPISODE OF RECURRENT MAJOR DEPRESSIVE DISORDER (HCC): Primary | ICD-10-CM

## 2022-04-19 DIAGNOSIS — Z95.818 PRESENCE OF OTHER CARDIAC IMPLANTS AND GRAFTS: Primary | ICD-10-CM

## 2022-04-19 PROCEDURE — G2066 INTER DEVC REMOTE 30D: HCPCS | Performed by: INTERNAL MEDICINE

## 2022-04-19 PROCEDURE — 90834 PSYTX W PT 45 MINUTES: CPT | Performed by: SOCIAL WORKER

## 2022-04-19 PROCEDURE — 93298 REM INTERROG DEV EVAL SCRMS: CPT | Performed by: INTERNAL MEDICINE

## 2022-04-19 NOTE — PSYCH
Psychotherapy Provided: Individual Psychotherapy 45 minutes     Length of time in session: 45 minutes, follow up in 2 week    MDDR, mild, ELIAS    Goals addressed in session: Goal 1, Goal 2 and Goal 3      Pain:      moderate to severe    0    Current suicide risk : Low     Data: Paulo arrived for his session  He claims his dialysis is going well  He believes he is managing his depression and anxiety  He wants a support dog but his parents are against him getting a dog  He continues to have multiple health issues but is finally on the transplant list  He prefers to get a kidney from someone who is no longer using it which elongates his wait  Assessment: Aminata Beebe denies suicidal/homicidal ideation, plan or intent  He feels considering all he is going thru he is managing his depression and his anxiety  He deals with difficult parents who he feels he is a burden to  He is actually coping with his multiple health issue  We continue to work on mindfulness, distress tolerance and radical acceptance  Plan: Continue to skill build in distress tolerance  Behavioral Health Treatment Plan [facility]: Diagnosis and Treatment Plan explained to Naina Milton relates understanding diagnosis and is agreeable to Treatment Plan   Yes

## 2022-04-19 NOTE — PROGRESS NOTES
MDT LOOP/ ACTIVE SYSTEM IS MRI CONDITIONAL   CARELINK TRANSMISSION: LOOP RECORDER  PRESENTING RHYTHM NSR @ 71 BPM  BATTERY STATUS "OK " NO PATIENT OR DEVICE ACTIVATED EPISODES  NORMAL DEVICE FUNCTION   DL

## 2022-04-21 ENCOUNTER — OFFICE VISIT (OUTPATIENT)
Dept: CARDIOLOGY CLINIC | Facility: CLINIC | Age: 37
End: 2022-04-21
Payer: MEDICARE

## 2022-04-21 VITALS
BODY MASS INDEX: 29.71 KG/M2 | SYSTOLIC BLOOD PRESSURE: 122 MMHG | HEART RATE: 79 BPM | WEIGHT: 212.2 LBS | OXYGEN SATURATION: 99 % | HEIGHT: 71 IN | DIASTOLIC BLOOD PRESSURE: 78 MMHG

## 2022-04-21 DIAGNOSIS — I25.10 CORONARY ARTERY DISEASE INVOLVING NATIVE CORONARY ARTERY OF NATIVE HEART WITHOUT ANGINA PECTORIS: ICD-10-CM

## 2022-04-21 DIAGNOSIS — I15.0 RENOVASCULAR HYPERTENSION: ICD-10-CM

## 2022-04-21 DIAGNOSIS — Z99.2 END STAGE RENAL DISEASE ON DIALYSIS DUE TO TYPE 1 DIABETES MELLITUS (HCC): Primary | ICD-10-CM

## 2022-04-21 DIAGNOSIS — Z86.73 HISTORY OF LACUNAR CEREBROVASCULAR ACCIDENT (CVA): Chronic | ICD-10-CM

## 2022-04-21 DIAGNOSIS — I27.20 PULMONARY HTN (HCC): ICD-10-CM

## 2022-04-21 DIAGNOSIS — E10.22 END STAGE RENAL DISEASE ON DIALYSIS DUE TO TYPE 1 DIABETES MELLITUS (HCC): Primary | ICD-10-CM

## 2022-04-21 DIAGNOSIS — N18.6 END STAGE RENAL DISEASE ON DIALYSIS DUE TO TYPE 1 DIABETES MELLITUS (HCC): Primary | ICD-10-CM

## 2022-04-21 PROCEDURE — 99214 OFFICE O/P EST MOD 30 MIN: CPT | Performed by: INTERNAL MEDICINE

## 2022-04-21 RX ORDER — LISINOPRIL 40 MG/1
80 TABLET ORAL DAILY
COMMUNITY
Start: 2022-02-09

## 2022-04-21 RX ORDER — DEXTROSE MONOHYDRATE 25 G/50ML
25 INJECTION, SOLUTION INTRAVENOUS
COMMUNITY
Start: 2022-04-11 | End: 2022-07-16

## 2022-04-22 PROBLEM — I25.10 CORONARY ARTERY DISEASE INVOLVING NATIVE CORONARY ARTERY OF NATIVE HEART WITHOUT ANGINA PECTORIS: Status: ACTIVE | Noted: 2022-04-22

## 2022-04-22 PROBLEM — I27.20 PULMONARY HTN (HCC): Status: ACTIVE | Noted: 2022-04-22

## 2022-04-22 NOTE — PROGRESS NOTES
Cardiology Follow Up    Trista Coats  1985  [id number]  [facility]  [address] [id number] [phone]    1  End stage renal disease on dialysis due to type 1 diabetes mellitus ([facility]     2  Renovascular hypertension     3  History of lacunar cerebrovascular accident (CVA)     4  Coronary artery disease involving native coronary artery of native heart without angina pectoris     5  Pulmonary HTN ([facility] )           Discussion/Summary: All of his assessed cardiac problems are stable  I have reviewed his medications and made no changes  No further cardiac testing is needed  I will plan to get his ILR out  RTO 1 year  Interval History: He is now listed for kidney transplant  He tries to stay active and denies CP, SOB  He has an ILR in place for 4 years now  No AF is seen  He is on ASA  He had a previous lacunar infarct  Cardiac cath 7/2021 - non critical CAD, moderate pulmonary HTN  /78      Patient Active Problem List   Diagnosis    Type 1 diabetes mellitus (HCC)    History of lacunar cerebrovascular accident (CVA)    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,      Binocular visual disturbance    Hyperphosphatemia    Vitamin D deficiency    Homozygous MTHFR mutation [id number]    Anemia in chronic kidney disease, on chronic dialysis ([facility])    Persistent proteinuria    Ataxia    Esophagitis    Left atrial dilation    Renovascular hypertension    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 ([facility] )    Dyslipidemia    Strabismus    Diarrhea    Environmental and seasonal allergies    Pruritus    Gastroparesis diabeticorum (HCC)    Peripheral polyneuropathy    Multiple pulmonary nodules    Vertigo    Impaired mobility and ADLs    Diabetic neuropathy ([facility])    Provoked seizure ([facility])    Asterixis    Foot drop, bilateral    Secondary hyperparathyroidism ([facility] )  Orthostatic hypotension    Eosinophilic leukocytosis    Protein-calorie malnutrition (HCC)    End stage renal disease on dialysis due to type 1 diabetes mellitus (HCC)    Tubulovillous adenoma of colon    Gastroesophageal reflux disease without esophagitis    Medical cannabis use    Numbness of arm    Mild episode of recurrent major depressive disorder ([facility]    Generalized anxiety disorder    Hypothyroidism    Coronary artery disease involving native coronary artery of native heart without angina pectoris    Pulmonary HTN ([address]     Past Medical History:   Diagnosis Date    Acute kidney injury ([facility]     Ambulates with cane     Anuria     Anxiety     Cellulitis of right elbow 3/31/2021    Chronic kidney disease     Depression     Diabetes mellitus ([address]     Diarrhea     Emesis 10/24/2020    End stage renal disease ([facility] ) 2/11/2018    Formatting of this note might be different from the original  Last Assessment & Plan:  Secondary to DM  On nightly PD  Followed by [facility]    Patient considering transplant for kidney and pancreas through [id number] of this note might be different from the original  Last Assessment & Plan:  Formatting of this note might be different from the original  Lab Results  Component Value Date   EGFR     Falls     Gastroparesis     GERD (gastroesophageal reflux disease)     History of shingles 2010    History of transfusion 02/2018    no adverse reaction    Hyperlipidemia     Hyperphosphatemia     Hypertension     Itching     Muscle weakness     general unsteadiness    Obesity (BMI 30 0-34 9) 9/9/2019    PONV (postoperative nausea and vomiting) 1/26/2018    Recurrent peritonitis ([address] due to peritoneal dialysis catheter 7/31/2020    Retinopathy     Seizures ([address]     early 2020 - one time    Skin abnormality     some dime size areas where skin was scratched from itching    Spontaneous bacterial peritonitis ([address] 10/19/2020    Stroke ([facility]     x2 - off balance/no driving/fatigue    Swelling of both lower extremities     Vomiting     Wears glasses      Social History     Socioeconomic History    Marital status: Single     Spouse name: Not on file    Number of children: Not on file    Years of education: Not on file    Highest education level: Not on file   Occupational History    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   Tobacco Use    Smoking status: Former Smoker     Packs/day: 0 50     Years: 12 00     Pack years: 6 00     Types: Cigarettes     Quit date: 2018     Years since quittin 2    Smokeless tobacco: Never Used    Tobacco comment: quit 2018   Vaping Use    Vaping Use: Every day    Substances: THC, CBD   Substance and Sexual Activity    Alcohol use: Not Currently    Drug use: Yes     Types: Marijuana     Comment: medical marijuana    Sexual activity: Not on file   Other Topics Concern    Not on file   Social History Narrative    Caffeine use    single     Social Determinants of Health     Financial Resource Strain: Not on file   Food Insecurity: Not on file   Transportation Needs: Not on file   Physical Activity: Not on file   Stress: Not on file   Social Connections: Not on file   Intimate Partner Violence: Not on file   Housing Stability: Not on file      Family History   Problem Relation Age of Onset    Breast cancer Mother     Hypertension Mother     Hyperlipidemia Father     Hypertension Father     Leukemia Maternal Grandmother     Hyperlipidemia Maternal Grandfather     Hypertension Maternal Grandfather     Hyperlipidemia Paternal Grandmother     Hypertension Paternal Grandmother     Heart disease Paternal Grandfather         cardiac disorder    Diabetes Paternal Grandfather      Past Surgical History:   Procedure Laterality Date    CARDIAC LOOP RECORDER  2018    COLONOSCOPY      EGD      EYE SURGERY Right     IR AV FISTULAGRAM/GRAFTOGRAM  2021    IR TUNNELED CENTRAL LINE PLACEMENT  2/16/2021    IR TUNNELED DIALYSIS CATHETER PLACEMENT  11/18/2020    IR TUNNELED DIALYSIS CATHETER REMOVAL  2/12/2021    IR TUNNELED DIALYSIS CATHETER REMOVAL  3/11/2021    PERITONEAL CATHETER INSERTION N/A 8/27/2018    Procedure: UNROOF PD CATHETER;  Surgeon: Ananth Moore DO;  [facility];  Service: General    NV ANASTOMOSIS,AV,ANY SITE Left 11/9/2020    Procedure: CREATION FISTULA  ARTERIOVENOUS (AV) - LEFT WRIST;  Surgeon: Malka Apley, MD;  [facility];  Service: Vascular    NV ESOPHAGOGASTRODUODENOSCOPY TRANSORAL DIAGNOSTIC N/A 4/18/2019    Procedure: ESOPHAGOGASTRODUODENOSCOPY (EGD); Surgeon: Manisha Adler MD;  Location: [facility];   Service: Gastroenterology    NV LAP INSERTION TUNNELED INTRAPERITONEAL CATHETER N/A 8/6/2018    Procedure: LAPAROSCOPIC PD CATHETER PLACEMENT;  Surgeon: Ananth Moore DO;  [facility];  Service: General    NV REMOVAL TUNNELED INTRAPERITONEAL CATHETER N/A 11/18/2020    Procedure: REMOVAL CATHETER PERITONEAL DIALYSIS;  Surgeon: Lillie Anthony MD;  [facility];  Service: General    TONSILLECTOMY      UPPER GASTROINTESTINAL ENDOSCOPY         Current Outpatient Medications:     Admelog SoloStar 100 units/mL injection pen, Inject 6 Units under the skin 3 (three) times a day with meals , Disp: , Rfl:     aspirin (ECOTRIN LOW STRENGTH) 81 mg EC tablet, Take 81 mg by mouth daily, Disp: , Rfl:     atorvastatin (LIPITOR) 40 mg tablet, Take 1 tablet (40 mg total) by mouth every evening, Disp: 90 tablet, Rfl: 1    b complex vitamins capsule, Take 1 capsule by mouth daily before lunch , Disp: , Rfl:     B-D ULTRAFINE III SHORT PEN 31G X 8 MM MISC, USE 1 PEN NEEDLE 8 TIMES DAILY, Disp: 100 each, Rfl: 47    calcium acetate (PHOSLO) capsule, TAKE 3 CAPSULES BY MOUTH WITH MEALS, Disp: , Rfl:     cholecalciferol (VITAMIN D3) 1,000 units tablet, Take 2,000 Units by mouth daily  , Disp: , Rfl:     cinacalcet (SENSIPAR) 60 MG tablet, Take 120 mg by mouth daily 2 tab daily , Disp: , Rfl:     cloNIDine (CATAPRES-TTS-3) 0 3 mg/24 hr, 1 patch once a week , Disp: , Rfl:     dextrose 50 %, 25 mL, Disp: , Rfl:     doxazosin (CARDURA) 2 mg tablet, TAKE 2 TABLETS BY MOUTH IN THE MORNING AND 2 IN THE EVENING (Patient taking differently: 2 mg daily ), Disp: 120 tablet, Rfl: 0    doxazosin (CARDURA) 8 MG tablet, Take 8 mg by mouth daily at bedtime, Disp: , Rfl:     escitalopram (LEXAPRO) 10 mg tablet, Take 1 tablet (10 mg total) by mouth daily, Disp: 90 tablet, Rfl: 1    famotidine (PEPCID) 40 MG tablet, Take 1 tablet (40 mg total) by mouth 2 (two) times a day (Patient taking differently: Take 40 mg by mouth daily  ), Disp: 60 tablet, Rfl: 5    gabapentin (NEURONTIN) 100 mg capsule, Take 2 tablets at bedtime, Disp: 60 capsule, Rfl: 5    GLUCAGON EMERGENCY 1 MG injection, INJECT 1MG SUBCUTANEOUSLY AS NEEDED (AS DIRECTED)   MAY REPEAT DOSE EVERY 20 MINUTES AS NEEDED, Disp: , Rfl: 3    hydrOXYzine HCL (ATARAX) 10 mg tablet, Take 1 tablet (10 mg total) by mouth every 6 (six) hours as needed for itching, Disp: 30 tablet, Rfl: 0    insulin aspart (NovoLOG) 100 units/mL injection, Inject 25 Units under the skin Sliding scale, Disp: , Rfl:     insulin glargine (Basaglar KwikPen) 100 units/mL injection pen, Inject 14 Units under the skin daily at bedtime (Patient taking differently: Inject 7 Units under the skin daily at bedtime  ), Disp: , Rfl:     insulin lispro (HumaLOG) 100 units/mL injection, Inject 4 Units under the skin 3 (three) times a day with meals, Disp: 10 mL, Rfl: 0    insulin NPH (HumuLIN N,NovoLIN N) 100 Units/mL subcutaneous injection, Inject 6 Units under the skin daily at bedtime  , Disp: , Rfl:     labetalol (NORMODYNE) 200 mg tablet, TAKE 2 TABLETS BY MOUTH THREE TIMES DAILY, Disp: 180 tablet, Rfl: 0    lisinopril (ZESTRIL) 40 mg tablet, Take 80 mg by mouth daily, Disp: , Rfl:     minoxidil (LONITEN) 2 5 mg tablet, Take 2 5 mg by mouth 2 (two) times a day , Disp: , Rfl:     NIFEdipine ER (ADALAT CC) 60 MG 24 hr tablet, Take 1 tablet (60 mg total) by mouth 2 (two) times a day, Disp: 180 tablet, Rfl: 0    ondansetron (ZOFRAN) 4 mg tablet, Take 4 mg by mouth every 8 (eight) hours as needed for nausea or vomiting, Disp: , Rfl:     Patiromer Sorbitex Calcium (VELTASSA PO), Take 5 g by mouth once a week, Disp: , Rfl:     saccharomyces boulardii (FLORASTOR) 250 mg capsule, Take 1 capsule (250 mg total) by mouth 2 (two) times a day (Patient taking differently: Take 250 mg by mouth daily ), Disp: 60 capsule, Rfl: 0    torsemide (DEMADEX) 100 mg tablet, Take 100 mg by mouth daily  , Disp: , Rfl:     traZODone (DESYREL) 50 mg tablet, Take 1 tablet (50 mg total) by mouth daily at bedtime as needed for sleep, Disp: 30 tablet, Rfl: 1    amoxicillin (AMOXIL) 500 mg capsule, TAKE 4 CAPSULES BY MOUTH 1 HOUR PRIOR TO PROCEDURE (Patient not taking: Reported on 4/21/2022), Disp: , Rfl:     hydrALAZINE (APRESOLINE) 100 MG tablet, Take 0 5 tablets (50 mg total) by mouth 2 (two) times a day (Patient not taking: Reported on 4/21/2022 ), Disp: 90 tablet, Rfl: 1    levothyroxine 25 mcg tablet, Take 1 tablet (25 mcg total) by mouth daily (Patient not taking: Reported on 4/21/2022 ), Disp: 30 tablet, Rfl: 1    lisinopril (ZESTRIL) 20 mg tablet, Take 1 tablet (20 mg total) by mouth daily (Patient not taking: Reported on 4/21/2022 ), Disp: 30 tablet, Rfl: 0    Lokelma 5 g PACK, see administration instructions Once daily on non dialysis days (Patient not taking: Reported on 4/21/2022 ), Disp: , Rfl:     metolazone (ZAROXOLYN) 10 mg tablet, Take 5 mg by mouth daily  (Patient not taking: Reported on 4/21/2022 ), Disp: , Rfl:   Allergies   Allergen Reactions    Sulfa Antibiotics Rash     Vitals:    04/21/22 1054   BP: 122/78   BP Location: Right arm   Patient Position: Sitting   Cuff Size: Large   Pulse: 79   SpO2: 99%   Weight: 96 3 kg (212 lb 3 2 oz)   Height: 5' 11" (1 803 m)     Weight (last 2 days)     Date/Time Weight    04/21/22 1054 96 3 (212 2)         Blood pressure 122/78, pulse 79, height 5' 11" (1 803 m), weight 96 3 kg (212 lb 3 2 oz), SpO2 99 %  , Body mass index is 29 6 kg/m²      Labs:  Orders Only on 01/19/2022   Component Date Value    SARS-CoV-2 01/19/2022 Negative    Lab Requisition on 01/05/2022   Component Date Value    Hep B Core Total Ab 01/05/2022 Non-reactive     Hep B S Ab 01/05/2022 465 78     Hepatitis B Surface Ag 01/05/2022 Non-reactive    Admission on 01/05/2022, Discharged on 01/05/2022   Component Date Value    Ventricular Rate 01/05/2022 86     Atrial Rate 01/05/2022 86     TX Interval 01/05/2022 178     QRSD Interval 01/05/2022 70     QT Interval 01/05/2022 398     QTC Interval 01/05/2022 476     P Axis 01/05/2022 67     QRS Axis 01/05/2022 75     T Wave [place] 01/05/2022 74    Appointment on 12/23/2021   Component Date Value    WBC 12/23/2021 5 05     RBC 12/23/2021 3 88     Hemoglobin 12/23/2021 12 3     Hematocrit 12/23/2021 37 9     MCV 12/23/2021 98     MCH 12/23/2021 31 7     MCHC 12/23/2021 32 5     RDW 12/23/2021 13 7     Platelets 85/33/1519 246     MPV 12/23/2021 9 4     Sodium 12/23/2021 141     Potassium 12/23/2021 4 6     Chloride 12/23/2021 101     CO2 12/23/2021 29     ANION GAP 12/23/2021 11     BUN 12/23/2021 33*    Creatinine 12/23/2021 8 14*    Glucose, Fasting 12/23/2021 191*    Calcium 12/23/2021 9 1     AST 12/23/2021 18     ALT 12/23/2021 21     Alkaline Phosphatase 12/23/2021 105     Total Protein 12/23/2021 7 0     Albumin 12/23/2021 3 9     Total Bilirubin 12/23/2021 0 66     eGFR 12/23/2021 7     NT-proBNP 12/23/2021 7,339*    TSH 3RD GENERATON 12/23/2021 4 161*    Free T4 12/23/2021 0 74*    Hep B Core Total Ab 12/23/2021 Non-reactive     Hepatitis B Surface Ag 12/23/2021 Non-reactive     Hep B S Ab 12/23/2021 426 80     Hepatitis C Ab 12/23/2021 Non-reactive     Protime 12/23/2021 13 2     INR 12/23/2021 1 00    Lab Requisition on 11/03/2021   Component Date Value    Potassium 11/03/2021 6 0*     Imaging: Cardiac EP device report    Result Date: 4/19/2022  Narrative: MDT LOOP/ ACTIVE SYSTEM IS MRI CONDITIONAL CARELINK TRANSMISSION: LOOP RECORDER  PRESENTING RHYTHM NSR @ 71 BPM  BATTERY STATUS "OK " NO PATIENT OR DEVICE ACTIVATED EPISODES  NORMAL DEVICE FUNCTION  DL       Review of Systems:  Review of Systems   Constitutional: Negative for diaphoresis, fatigue, fever and unexpected weight change  HENT: Negative  Respiratory: Negative for cough, shortness of breath and wheezing  Cardiovascular: Negative for chest pain, palpitations and leg swelling  Gastrointestinal: Negative for abdominal pain, diarrhea and nausea  Musculoskeletal: Negative for gait problem and myalgias  Skin: Negative for rash  Neurological: Negative for dizziness and numbness  Psychiatric/Behavioral: Negative  Physical Exam:  Physical Exam  Constitutional:       Appearance: He is well-developed  HENT:      Head: Normocephalic and atraumatic  Eyes:      Pupils: Pupils are equal, round, and reactive to light  Neck:      Vascular: No JVD  Cardiovascular:      Rate and Rhythm: Regular rhythm  Pulses: Normal pulses  Carotid pulses are 2+ on the right side and 2+ on the left side  Heart sounds: S1 normal and S2 normal    Pulmonary:      Effort: Pulmonary effort is normal       Breath sounds: Normal breath sounds  No wheezing or rales  Abdominal:      General: Bowel sounds are normal       Palpations: Abdomen is soft  Tenderness: There is no abdominal tenderness  Musculoskeletal:         General: No tenderness  Normal range of motion  Cervical back: Normal range of motion and neck supple  Skin:     General: Skin is warm  Neurological:      Mental Status: He is alert and oriented to person, place, and time        Cranial Nerves: No cranial nerve deficit  Deep Tendon Reflexes: Reflexes are normal and symmetric

## 2022-04-26 ENCOUNTER — TELEPHONE (OUTPATIENT)
Dept: INTERNAL MEDICINE CLINIC | Facility: CLINIC | Age: 37
End: 2022-04-26

## 2022-04-26 NOTE — TELEPHONE ENCOUNTER
Patient's father says the patient has a dental procedure Thursday and the dentist told him to contact us for a script for amoxicillin 500mg capsules  Take 4 an hour before      Pharm: [facility]

## 2022-04-27 NOTE — TELEPHONE ENCOUNTER
Spoke to patient's father  Patient is scheduled for dental cleaning tomorrow in [place]  Father advised patient has no indication for antibiotic ppx for dental cleaning  I will not be prescribing amoxicillin

## 2022-05-03 ENCOUNTER — OFFICE VISIT (OUTPATIENT)
Dept: NEPHROLOGY | Facility: CLINIC | Age: 37
End: 2022-05-03
Payer: MEDICARE

## 2022-05-03 VITALS
SYSTOLIC BLOOD PRESSURE: 118 MMHG | HEIGHT: 67 IN | BODY MASS INDEX: 33.59 KG/M2 | DIASTOLIC BLOOD PRESSURE: 82 MMHG | OXYGEN SATURATION: 99 % | WEIGHT: 214 LBS

## 2022-05-03 DIAGNOSIS — R91.8 LUNG NODULES: Primary | ICD-10-CM

## 2022-05-03 PROCEDURE — 99215 OFFICE O/P EST HI 40 MIN: CPT | Performed by: INTERNAL MEDICINE

## 2022-05-03 NOTE — LETTER
May 3, 2022     Kofi Ji DO  [address]  [address], [place] [id number]    Patient: Cecil Monet   YOB: 1985   Date of Visit: 5/3/2022       Dear Dr Kosta Donis: Thank you for referring Eloisa Wade to me for evaluation  Below are my notes for this consultation  If you have questions, please do not hesitate to call me  I look forward to following your patient along with you  Sincerely,        Dejon Short MD        CC: No Recipients  Dejon Short MD  5/3/2022  5:24 PM  Sign when Signing Visit  NEPHROLOGY OFFICE VISIT   Lashanda Mckeon 43 y o  male MRN: [id number]  5/3/2022    Reason for Visit: renal transplant yearly pre eval f/u    ASSESSMENT and PLAN:    I had the pleasure of seeing Mr Elizabeth Wilkerson today in the renal clinic for the continued management of pre renal transplant yearly f/u  Cecil Monet is a 43 y o  male  male referred by Dr Nish Walker with a past medical history of ESRD on peritoneal dialysis prior and now on hemodialysis at [facility] due to having peritonitis with peritoneal dialysis, hypertension for approximately 4-6 years, diabetes diagnosed at 1years old type 1, CVA 1st in 2015 and again in 2018, hypertension, smoker (for 8 years), rare ETOH, uses medical marijuana, hyperlipidemia seen in the Nephrology Clinic for pre-transplant kidney evaluation      ESRD presumed to be due to DM/HTN    On HD since 2018      Native disease secondary to diabetes/hypertension not biopsy proven     Prior MRI brain -new area of T2 hyperintensity in the right middle and right inferior cerebellar peduncle with resolution of a focus of hyperintensity in the right subinsular region   Suggesting dissemination in time and space  Sylvia Cue is concern for demyelinating disease      Renal Doppler study with no evidence of occlusive disease bilaterally   There is possible intrinsic parenchymal disease on the left kidney   Left renal artery was not able to be evaluated due to bowel gas      CT scan of the abdomen and pelvis in November 2020 with moderate ascites, reason Deon and omental in edema stable from prior, otherwise no acute abnormality      CT scan in November 2020 with small pulmonary nodule the right middle lobe      Echocardiogram with SANJEEV in 2018 with EF 60%      ECHO in July 2020 - EF 68%; mild diastolic dysfunction;      Stress test is nl in 2/2019 July 2021-patient underwent right left heart catheterization  Mild-to-moderate elevated LVE DS, moderate pulmonary hypertension, no significant CAD, mid RCA 40% stenosis    October 2021-patient saw pulmonologist due to incidental finding of nodules in the lung  Recommended repeat CT scan in 2022  But no absolute contraindication from pulmonary standpoint for renal transplantation  October 2021-patient saw [facility] Cardiology team    December 2021-patient saw pulmonary hypertensive specialist at [facility] -was felt to be due to secondary to volume overload  Pulmonary vasodilators was contraindicated  Had repeat echocardiogram December 2021  States that there was reassuring features of normal RV size and function  RVSP only 29  Therefore euvolemic   -patient did not show features of moderate pulmonary hypertension that was seen 5 months prior      Plan     Will review the patient's case with the transplant committee meeting for yearly follow-up      Patient was advised to have CT scan completed of the chest to follow-up nodules    Continue local follow-up with cardiology team is doing    History of stroke and lacunar infarct  Also has 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 and underwent plasma exchange prior   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 heterozygote for prothrombin gene mutation   Patient followed with hematology and neurology prior   On plavix and aspirin  -will need to clarify Hematology clearance with committee    History of gastroparesis    Noted cardiology plan to remove loop recorder    I have message [facility] transplant coordinator with the information above along with the patient pulmonary team      No problem-specific Assessment & Plan notes found for this encounter  HPI:      Patient denies new complaints  No recent illnesses  PATIENT INSTRUCTIONS:    Patient Instructions   1) will see you back in one year  2) will present your case to our committee in 1-2 weeks and will call with any updates  3) please complete CT scan of chest        OBJECTIVE:  Current Weight: Weight - Scale: 97 1 kg (214 lb)  Vitals:    05/03/22 1625   BP: 118/82   BP Location: Right arm   Patient Position: Sitting   Cuff Size: Large   SpO2: 99%   Weight: 97 1 kg (214 lb)   Height: 5' 7" (1 702 m)    Body mass index is 33 52 kg/m²  REVIEW OF SYSTEMS:    Review of Systems   All other systems reviewed and are negative  PHYSICAL EXAM:      Physical Exam  Vitals and nursing note reviewed  Constitutional:       General: He is not in acute distress  Appearance: He is well-developed  He is not diaphoretic  HENT:      Head: Normocephalic and atraumatic  Eyes:      General: No scleral icterus  Right eye: No discharge  Left eye: No discharge  Conjunctiva/sclera: Conjunctivae normal    Neck:      Vascular: No JVD  Cardiovascular:      Rate and Rhythm: Normal rate and regular rhythm  Heart sounds: Normal heart sounds  No murmur heard  No friction rub  No gallop  Pulmonary:      Effort: Pulmonary effort is normal  No respiratory distress  Breath sounds: Normal breath sounds  No wheezing or rales  Chest:      Chest wall: No tenderness  Abdominal:      General: Bowel sounds are normal  There is no distension  Palpations: Abdomen is soft  Tenderness: There is no abdominal tenderness  There is no rebound  Musculoskeletal:         General: No tenderness or deformity  Normal range of motion  Cervical back: Normal range of motion and neck supple  Skin:     General: Skin is warm and dry  Coloration: Skin is not pale  Findings: No erythema or rash  Neurological:      Mental Status: He is alert and oriented to person, place, and time  Cranial Nerves: No cranial nerve deficit  Coordination: Coordination normal       Deep Tendon Reflexes: Reflexes are normal and symmetric  Psychiatric:         Behavior: Behavior normal          Thought Content:  Thought content normal          Judgment: Judgment normal          Medications:    Current Outpatient Medications:     aspirin (ECOTRIN LOW STRENGTH) 81 mg EC tablet, Take 81 mg by mouth daily, Disp: , Rfl:     atorvastatin (LIPITOR) 40 mg tablet, Take 1 tablet (40 mg total) by mouth every evening, Disp: 90 tablet, Rfl: 1    b complex vitamins capsule, Take 1 capsule by mouth daily before lunch , Disp: , Rfl:     B-D ULTRAFINE III SHORT PEN 31G X 8 MM MISC, USE 1 PEN NEEDLE 8 TIMES DAILY, Disp: 100 each, Rfl: 47    calcium acetate (PHOSLO) capsule, TAKE 3 CAPSULES BY MOUTH WITH MEALS, Disp: , Rfl:     cholecalciferol (VITAMIN D3) 1,000 units tablet, Take 2,000 Units by mouth daily  , Disp: , Rfl:     cinacalcet (SENSIPAR) 60 MG tablet, Take 120 mg by mouth daily 2 tab daily , Disp: , Rfl:     cloNIDine (CATAPRES-TTS-3) 0 3 mg/24 hr, 1 patch once a week , Disp: , Rfl:     doxazosin (CARDURA) 2 mg tablet, TAKE 2 TABLETS BY MOUTH IN THE MORNING AND 2 IN THE EVENING (Patient taking differently: 2 mg daily ), Disp: 120 tablet, Rfl: 0    escitalopram (LEXAPRO) 10 mg tablet, Take 1 tablet (10 mg total) by mouth daily, Disp: 90 tablet, Rfl: 1    famotidine (PEPCID) 40 MG tablet, Take 1 tablet (40 mg total) by mouth 2 (two) times a day (Patient taking differently: Take 40 mg by mouth daily  ), Disp: 60 tablet, Rfl: 5    gabapentin (NEURONTIN) 100 mg capsule, Take 2 tablets at bedtime, Disp: 60 capsule, Rfl: 5    GLUCAGON EMERGENCY 1 MG injection, INJECT 1MG SUBCUTANEOUSLY AS NEEDED (AS DIRECTED)   MAY REPEAT DOSE EVERY 20 MINUTES AS NEEDED, Disp: , Rfl: 3    hydrOXYzine HCL (ATARAX) 10 mg tablet, Take 1 tablet (10 mg total) by mouth every 6 (six) hours as needed for itching, Disp: 30 tablet, Rfl: 0    insulin aspart (NovoLOG) 100 units/mL injection, Inject 25 Units under the skin Sliding scale, Disp: , Rfl:     insulin glargine (Basaglar KwikPen) 100 units/mL injection pen, Inject 14 Units under the skin daily at bedtime (Patient taking differently: Inject 7 Units under the skin daily at bedtime  ), Disp: , Rfl:     labetalol (NORMODYNE) 200 mg tablet, TAKE 2 TABLETS BY MOUTH THREE TIMES DAILY, Disp: 180 tablet, Rfl: 0    levothyroxine 25 mcg tablet, Take 1 tablet (25 mcg total) by mouth daily, Disp: 30 tablet, Rfl: 1    lisinopril (ZESTRIL) 40 mg tablet, Take 80 mg by mouth daily, Disp: , Rfl:     metolazone (ZAROXOLYN) 10 mg tablet, Take 5 mg by mouth daily  , Disp: , Rfl:     minoxidil (LONITEN) 2 5 mg tablet, Take 2 5 mg by mouth 2 (two) times a day , Disp: , Rfl:     NIFEdipine ER (ADALAT CC) 60 MG 24 hr tablet, Take 1 tablet (60 mg total) by mouth 2 (two) times a day, Disp: 180 tablet, Rfl: 0    ondansetron (ZOFRAN) 4 mg tablet, Take 4 mg by mouth every 8 (eight) hours as needed for nausea or vomiting, Disp: , Rfl:     Patiromer Sorbitex Calcium (VELTASSA PO), Take 5 g by mouth once a week, Disp: , Rfl:     saccharomyces boulardii (FLORASTOR) 250 mg capsule, Take 1 capsule (250 mg total) by mouth 2 (two) times a day (Patient taking differently: Take 250 mg by mouth daily ), Disp: 60 capsule, Rfl: 0    torsemide (DEMADEX) 100 mg tablet, Take 100 mg by mouth daily  , Disp: , Rfl:     Admelog SoloStar 100 units/mL injection pen, Inject 6 Units under the skin 3 (three) times a day with meals  (Patient not taking: Reported on 5/3/2022 ), Disp: , Rfl:     amoxicillin (AMOXIL) 500 mg capsule, TAKE 4 CAPSULES BY MOUTH 1 HOUR PRIOR TO PROCEDURE (Patient not taking: Reported on 4/21/2022), Disp: , Rfl:     dextrose 50 %, 25 mL (Patient not taking: Reported on 5/3/2022 ), Disp: , Rfl:     doxazosin (CARDURA) 8 MG tablet, Take 8 mg by mouth daily at bedtime (Patient not taking: Reported on 5/3/2022 ), Disp: , Rfl:     hydrALAZINE (APRESOLINE) 100 MG tablet, Take 0 5 tablets (50 mg total) by mouth 2 (two) times a day (Patient not taking: Reported on 4/21/2022 ), Disp: 90 tablet, Rfl: 1    insulin lispro (HumaLOG) 100 units/mL injection, Inject 4 Units under the skin 3 (three) times a day with meals (Patient not taking: Reported on 5/3/2022 ), Disp: 10 mL, Rfl: 0    insulin NPH (HumuLIN N,NovoLIN N) 100 Units/mL subcutaneous injection, Inject 6 Units under the skin daily at bedtime   (Patient not taking: Reported on 5/3/2022 ), Disp: , Rfl:     lisinopril (ZESTRIL) 20 mg tablet, Take 1 tablet (20 mg total) by mouth daily (Patient not taking: Reported on 5/3/2022 ), Disp: 30 tablet, Rfl: 0    Lokelma 5 g PACK, see administration instructions Once daily on non dialysis days (Patient not taking: Reported on 4/21/2022 ), Disp: , Rfl:     traZODone (DESYREL) 50 mg tablet, Take 1 tablet (50 mg total) by mouth daily at bedtime as needed for sleep (Patient not taking: Reported on 5/3/2022 ), Disp: 30 tablet, Rfl: 1    Laboratory Results:        Invalid input(s): ALBUMIN    Results for orders placed or performed in visit on 01/19/22   COVID Only- Collected at   [facility]    Specimen: Nose; Nares   Result Value Ref Range    SARS-CoV-2 Negative Negative

## 2022-05-03 NOTE — PROGRESS NOTES
NEPHROLOGY OFFICE VISIT   Savanna Mckeon 43 y o  male MRN: [id number]  5/3/2022    Reason for Visit: renal transplant yearly pre eval f/u    ASSESSMENT and PLAN:    I had the pleasure of seeing Mr Sury Lewis today in the renal clinic for the continued management of pre renal transplant yearly f/u  Polo Candelario is a 43 y o  male  male referred by Dr Stacy Louie with a past medical history of ESRD on peritoneal dialysis prior and now on hemodialysis at [facility] due to having peritonitis with peritoneal dialysis, hypertension for approximately 4-6 years, diabetes diagnosed at 1years old type 1, CVA 1st in 2015 and again in 2018, hypertension, smoker (for 8 years), rare ETOH, uses medical marijuana, hyperlipidemia seen in the Nephrology Clinic for pre-transplant kidney evaluation      ESRD presumed to be due to DM/HTN  On HD since 2018      Native disease secondary to diabetes/hypertension not biopsy proven     Prior MRI brain -new area of T2 hyperintensity in the right middle and right inferior cerebellar peduncle with resolution of a focus of hyperintensity in the right subinsular region   Suggesting dissemination in time and space  Devon Felty is concern for demyelinating disease      Renal Doppler study with no evidence of occlusive disease bilaterally   There is possible intrinsic parenchymal disease on the left kidney   Left renal artery was not able to be evaluated due to bowel gas      CT scan of the abdomen and pelvis in November 2020 with moderate ascites, reason Deon and omental in edema stable from prior, otherwise no acute abnormality      CT scan in November 2020 with small pulmonary nodule the right middle lobe      Echocardiogram with SANJEEV in 2018 with EF 60%      ECHO in July 2020 - EF 67%; mild diastolic dysfunction;      Stress test is nl in 2/2019 July 2021-patient underwent right left heart catheterization    Mild-to-moderate elevated LVE DS, moderate pulmonary hypertension, no significant CAD, mid RCA 40% stenosis    October 2021-patient saw pulmonologist due to incidental finding of nodules in the lung  Recommended repeat CT scan in 2022  But no absolute contraindication from pulmonary standpoint for renal transplantation  October 2021-patient saw [facility] Cardiology team    December 2021-patient saw pulmonary hypertensive specialist at [facility] -was felt to be due to secondary to volume overload  Pulmonary vasodilators was contraindicated  Had repeat echocardiogram December 2021  States that there was reassuring features of normal RV size and function  RVSP only 29  Therefore euvolemic   -patient did not show features of moderate pulmonary hypertension that was seen 5 months prior      Plan     Will review the patient's case with the transplant committee meeting for yearly follow-up  Patient was advised to have CT scan completed of the chest to follow-up nodules    Continue local follow-up with cardiology team is doing    History of stroke and lacunar infarct  Also has 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 and underwent plasma exchange prior   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 heterozygote for prothrombin gene mutation   Patient followed with hematology and neurology prior   On plavix and aspirin  -will need to clarify Hematology clearance with committee    History of gastroparesis    Noted cardiology plan to remove loop recorder    I have message [facility] transplant coordinator with the information above along with the patient pulmonary team      No problem-specific Assessment & Plan notes found for this encounter  HPI:      Patient denies new complaints  No recent illnesses      PATIENT INSTRUCTIONS:    Patient Instructions   1) will see you back in one year  2) will present your case to our committee in 1-2 weeks and will call with any updates  3) please complete CT scan of chest        OBJECTIVE:  Current Weight: Weight - Scale: 97 1 kg (214 lb)  Vitals:    05/03/22 1625   BP: 118/82   BP Location: Right arm   Patient Position: Sitting   Cuff Size: Large   SpO2: 99%   Weight: 97 1 kg (214 lb)   Height: 5' 7" (1 702 m)    Body mass index is 33 52 kg/m²  REVIEW OF SYSTEMS:    Review of Systems   All other systems reviewed and are negative  PHYSICAL EXAM:      Physical Exam  Vitals and nursing note reviewed  Constitutional:       General: He is not in acute distress  Appearance: He is well-developed  He is not diaphoretic  HENT:      Head: Normocephalic and atraumatic  Eyes:      General: No scleral icterus  Right eye: No discharge  Left eye: No discharge  Conjunctiva/sclera: Conjunctivae normal    Neck:      Vascular: No JVD  Cardiovascular:      Rate and Rhythm: Normal rate and regular rhythm  Heart sounds: Normal heart sounds  No murmur heard  No friction rub  No gallop  Pulmonary:      Effort: Pulmonary effort is normal  No respiratory distress  Breath sounds: Normal breath sounds  No wheezing or rales  Chest:      Chest wall: No tenderness  Abdominal:      General: Bowel sounds are normal  There is no distension  Palpations: Abdomen is soft  Tenderness: There is no abdominal tenderness  There is no rebound  Musculoskeletal:         General: No tenderness or deformity  Normal range of motion  Cervical back: Normal range of motion and neck supple  Skin:     General: Skin is warm and dry  Coloration: Skin is not pale  Findings: No erythema or rash  Neurological:      Mental Status: He is alert and oriented to person, place, and time  Cranial Nerves: No cranial nerve deficit  Coordination: Coordination normal       Deep Tendon Reflexes: Reflexes are normal and symmetric  Psychiatric:         Behavior: Behavior normal          Thought Content:  Thought content normal          Judgment: Judgment normal          Medications:    Current Outpatient Medications:     aspirin (ECOTRIN LOW STRENGTH) 81 mg EC tablet, Take 81 mg by mouth daily, Disp: , Rfl:     atorvastatin (LIPITOR) 40 mg tablet, Take 1 tablet (40 mg total) by mouth every evening, Disp: 90 tablet, Rfl: 1    b complex vitamins capsule, Take 1 capsule by mouth daily before lunch , Disp: , Rfl:     B-D ULTRAFINE III SHORT PEN 31G X 8 MM MISC, USE 1 PEN NEEDLE 8 TIMES DAILY, Disp: 100 each, Rfl: 47    calcium acetate (PHOSLO) capsule, TAKE 3 CAPSULES BY MOUTH WITH MEALS, Disp: , Rfl:     cholecalciferol (VITAMIN D3) 1,000 units tablet, Take 2,000 Units by mouth daily  , Disp: , Rfl:     cinacalcet (SENSIPAR) 60 MG tablet, Take 120 mg by mouth daily 2 tab daily , Disp: , Rfl:     cloNIDine (CATAPRES-TTS-3) 0 3 mg/24 hr, 1 patch once a week , Disp: , Rfl:     doxazosin (CARDURA) 2 mg tablet, TAKE 2 TABLETS BY MOUTH IN THE MORNING AND 2 IN THE EVENING (Patient taking differently: 2 mg daily ), Disp: 120 tablet, Rfl: 0    escitalopram (LEXAPRO) 10 mg tablet, Take 1 tablet (10 mg total) by mouth daily, Disp: 90 tablet, Rfl: 1    famotidine (PEPCID) 40 MG tablet, Take 1 tablet (40 mg total) by mouth 2 (two) times a day (Patient taking differently: Take 40 mg by mouth daily  ), Disp: 60 tablet, Rfl: 5    gabapentin (NEURONTIN) 100 mg capsule, Take 2 tablets at bedtime, Disp: 60 capsule, Rfl: 5    GLUCAGON EMERGENCY 1 MG injection, INJECT 1MG SUBCUTANEOUSLY AS NEEDED (AS DIRECTED)   MAY REPEAT DOSE EVERY 20 MINUTES AS NEEDED, Disp: , Rfl: 3    hydrOXYzine HCL (ATARAX) 10 mg tablet, Take 1 tablet (10 mg total) by mouth every 6 (six) hours as needed for itching, Disp: 30 tablet, Rfl: 0    insulin aspart (NovoLOG) 100 units/mL injection, Inject 25 Units under the skin Sliding scale, Disp: , Rfl:     insulin glargine (Basaglar KwikPen) 100 units/mL injection pen, Inject 14 Units under the skin daily at bedtime (Patient taking differently: Inject 7 Units under the skin daily at bedtime  ), Disp: , Rfl:     labetalol (NORMODYNE) 200 mg tablet, TAKE 2 TABLETS BY MOUTH THREE TIMES DAILY, Disp: 180 tablet, Rfl: 0    levothyroxine 25 mcg tablet, Take 1 tablet (25 mcg total) by mouth daily, Disp: 30 tablet, Rfl: 1    lisinopril (ZESTRIL) 40 mg tablet, Take 80 mg by mouth daily, Disp: , Rfl:     metolazone (ZAROXOLYN) 10 mg tablet, Take 5 mg by mouth daily  , Disp: , Rfl:     minoxidil (LONITEN) 2 5 mg tablet, Take 2 5 mg by mouth 2 (two) times a day , Disp: , Rfl:     NIFEdipine ER (ADALAT CC) 60 MG 24 hr tablet, Take 1 tablet (60 mg total) by mouth 2 (two) times a day, Disp: 180 tablet, Rfl: 0    ondansetron (ZOFRAN) 4 mg tablet, Take 4 mg by mouth every 8 (eight) hours as needed for nausea or vomiting, Disp: , Rfl:     Patiromer Sorbitex Calcium (VELTASSA PO), Take 5 g by mouth once a week, Disp: , Rfl:     saccharomyces boulardii (FLORASTOR) 250 mg capsule, Take 1 capsule (250 mg total) by mouth 2 (two) times a day (Patient taking differently: Take 250 mg by mouth daily ), Disp: 60 capsule, Rfl: 0    torsemide (DEMADEX) 100 mg tablet, Take 100 mg by mouth daily  , Disp: , Rfl:     Admelog SoloStar 100 units/mL injection pen, Inject 6 Units under the skin 3 (three) times a day with meals  (Patient not taking: Reported on 5/3/2022 ), Disp: , Rfl:     amoxicillin (AMOXIL) 500 mg capsule, TAKE 4 CAPSULES BY MOUTH 1 HOUR PRIOR TO PROCEDURE (Patient not taking: Reported on 4/21/2022), Disp: , Rfl:     dextrose 50 %, 25 mL (Patient not taking: Reported on 5/3/2022 ), Disp: , Rfl:     doxazosin (CARDURA) 8 MG tablet, Take 8 mg by mouth daily at bedtime (Patient not taking: Reported on 5/3/2022 ), Disp: , Rfl:     hydrALAZINE (APRESOLINE) 100 MG tablet, Take 0 5 tablets (50 mg total) by mouth 2 (two) times a day (Patient not taking: Reported on 4/21/2022 ), Disp: 90 tablet, Rfl: 1    insulin lispro (HumaLOG) 100 units/mL injection, Inject 4 Units under the skin 3 (three) times a day with meals (Patient not taking: Reported on 5/3/2022 ), Disp: 10 mL, Rfl: 0    insulin NPH (HumuLIN N,NovoLIN N) 100 Units/mL subcutaneous injection, Inject 6 Units under the skin daily at bedtime   (Patient not taking: Reported on 5/3/2022 ), Disp: , Rfl:     lisinopril (ZESTRIL) 20 mg tablet, Take 1 tablet (20 mg total) by mouth daily (Patient not taking: Reported on 5/3/2022 ), Disp: 30 tablet, Rfl: 0    Lokelma 5 g PACK, see administration instructions Once daily on non dialysis days (Patient not taking: Reported on 4/21/2022 ), Disp: , Rfl:     traZODone (DESYREL) 50 mg tablet, Take 1 tablet (50 mg total) by mouth daily at bedtime as needed for sleep (Patient not taking: Reported on 5/3/2022 ), Disp: 30 tablet, Rfl: 1    Laboratory Results:        Invalid input(s): ALBUMIN    Results for orders placed or performed in visit on 01/19/22   COVID Only- Collected at [facility]    Specimen: Nose; Nares   Result Value Ref Range    SARS-CoV-2 Negative Negative

## 2022-05-03 NOTE — LETTER
May 3, 2022     Charanjit Ardon, [address]  [address]    Patient: Polo Candelario   YOB: 1985   Date of Visit: 5/3/2022       Dear Dr Stacy Louie: Thank you for referring Dominic Ibarra to me for evaluation  Below are my notes for this consultation  If you have questions, please do not hesitate to call me  I look forward to following your patient along with you  Sincerely,        Madeline Rivera MD        CC: No Recipients  Madeline Rivera MD  5/3/2022  5:24 PM  Sign when Signing Visit  NEPHROLOGY OFFICE VISIT   Savanna Mckeon 43 y o  male MRN: [id number]  5/3/2022    Reason for Visit: renal transplant yearly pre eval f/u    ASSESSMENT and PLAN:    I had the pleasure of seeing Mr Sury Lewis today in the renal clinic for the continued management of pre renal transplant yearly f/u  Polo Candelario is a 43 y o  male  male referred by Dr Stacy Louie with a past medical history of ESRD on peritoneal dialysis prior and now on hemodialysis at [facility] due to having peritonitis with peritoneal dialysis, hypertension for approximately 4-6 years, diabetes diagnosed at 1years old type 1, CVA 1st in 2015 and again in 2018, hypertension, smoker (for 8 years), rare ETOH, uses medical marijuana, hyperlipidemia seen in the Nephrology Clinic for pre-transplant kidney evaluation      ESRD presumed to be due to DM/HTN    On HD since 2018      Native disease secondary to diabetes/hypertension not biopsy proven     Prior MRI brain -new area of T2 hyperintensity in the right middle and right inferior cerebellar peduncle with resolution of a focus of hyperintensity in the right subinsular region   Suggesting dissemination in time and space  Abdullahi Felty is concern for demyelinating disease      Renal Doppler study with no evidence of occlusive disease bilaterally   There is possible intrinsic parenchymal disease on the left kidney   Left renal artery was not able to be evaluated due to bowel gas      CT scan of the abdomen and pelvis in November 2020 with moderate ascites, reason Deon and omental in edema stable from prior, otherwise no acute abnormality      CT scan in November 2020 with small pulmonary nodule the right middle lobe      Echocardiogram with SANJEEV in 2018 with EF 60%      ECHO in July 2020 - EF 25%; mild diastolic dysfunction;      Stress test is nl in 2/2019 July 2021-patient underwent right left heart catheterization  Mild-to-moderate elevated LVE DS, moderate pulmonary hypertension, no significant CAD, mid RCA 40% stenosis    October 2021-patient saw pulmonologist due to incidental finding of nodules in the lung  Recommended repeat CT scan in 2022  But no absolute contraindication from pulmonary standpoint for renal transplantation  October 2021-[facility]    December 2021-patient saw pulmonary hypertensive specialist at [facility] -was felt to be due to secondary to volume overload  Pulmonary vasodilators was contraindicated  Had repeat echocardiogram December 2021  States that there was reassuring features of normal RV size and function  RVSP only 29  Therefore euvolemic   -patient did not show features of moderate pulmonary hypertension that was seen 5 months prior      Plan     Will review the patient's case with the transplant committee meeting for yearly follow-up      Patient was advised to have CT scan completed of the chest to follow-up nodules    Continue local follow-up with cardiology team is doing    History of stroke and lacunar infarct  Also has 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 and underwent plasma exchange prior   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 heterozygote for prothrombin gene mutation   Patient followed with hematology and neurology prior   On plavix and aspirin  -will need to clarify Hematology clearance with committee    History of gastroparesis    Noted cardiology plan to remove loop recorder    I have message [facility] transplant coordinator with the information above along with the patient pulmonary team      No problem-specific Assessment & Plan notes found for this encounter  HPI:      Patient denies new complaints  No recent illnesses  PATIENT INSTRUCTIONS:    Patient Instructions   1) will see you back in one year  2) will present your case to our committee in 1-2 weeks and will call with any updates  3) please complete CT scan of chest        OBJECTIVE:  Current Weight: Weight - Scale: 97 1 kg (214 lb)  Vitals:    05/03/22 1625   BP: 118/82   BP Location: Right arm   Patient Position: Sitting   Cuff Size: Large   SpO2: 99%   Weight: 97 1 kg (214 lb)   Height: 5' 7" (1 702 m)    Body mass index is 33 52 kg/m²  REVIEW OF SYSTEMS:    Review of Systems   All other systems reviewed and are negative  PHYSICAL EXAM:      Physical Exam  Vitals and nursing note reviewed  Constitutional:       General: He is not in acute distress  Appearance: He is well-developed  He is not diaphoretic  HENT:      Head: Normocephalic and atraumatic  Eyes:      General: No scleral icterus  Right eye: No discharge  Left eye: No discharge  Conjunctiva/sclera: Conjunctivae normal    Neck:      Vascular: No JVD  Cardiovascular:      Rate and Rhythm: Normal rate and regular rhythm  Heart sounds: Normal heart sounds  No murmur heard  No friction rub  No gallop  Pulmonary:      Effort: Pulmonary effort is normal  No respiratory distress  Breath sounds: Normal breath sounds  No wheezing or rales  Chest:      Chest wall: No tenderness  Abdominal:      General: Bowel sounds are normal  There is no distension  Palpations: Abdomen is soft  Tenderness: There is no abdominal tenderness  There is no rebound  Musculoskeletal:         General: No tenderness or deformity  Normal range of motion  Cervical back: Normal range of motion and neck supple  Skin:     General: Skin is warm and dry  Coloration: Skin is not pale  Findings: No erythema or rash  Neurological:      Mental Status: He is alert and oriented to person, place, and time  Cranial Nerves: No cranial nerve deficit  Coordination: Coordination normal       Deep Tendon Reflexes: Reflexes are normal and symmetric  Psychiatric:         Behavior: Behavior normal          Thought Content:  Thought content normal          Judgment: Judgment normal          Medications:    Current Outpatient Medications:     aspirin (ECOTRIN LOW STRENGTH) 81 mg EC tablet, Take 81 mg by mouth daily, Disp: , Rfl:     atorvastatin (LIPITOR) 40 mg tablet, Take 1 tablet (40 mg total) by mouth every evening, Disp: 90 tablet, Rfl: 1    b complex vitamins capsule, Take 1 capsule by mouth daily before lunch , Disp: , Rfl:     B-D ULTRAFINE III SHORT PEN 31G X 8 MM MISC, USE 1 PEN NEEDLE 8 TIMES DAILY, Disp: 100 each, Rfl: 47    calcium acetate (PHOSLO) capsule, TAKE 3 CAPSULES BY MOUTH WITH MEALS, Disp: , Rfl:     cholecalciferol (VITAMIN D3) 1,000 units tablet, Take 2,000 Units by mouth daily  , Disp: , Rfl:     cinacalcet (SENSIPAR) 60 MG tablet, Take 120 mg by mouth daily 2 tab daily , Disp: , Rfl:     cloNIDine (CATAPRES-TTS-3) 0 3 mg/24 hr, 1 patch once a week , Disp: , Rfl:     doxazosin (CARDURA) 2 mg tablet, TAKE 2 TABLETS BY MOUTH IN THE MORNING AND 2 IN THE EVENING (Patient taking differently: 2 mg daily ), Disp: 120 tablet, Rfl: 0    escitalopram (LEXAPRO) 10 mg tablet, Take 1 tablet (10 mg total) by mouth daily, Disp: 90 tablet, Rfl: 1    famotidine (PEPCID) 40 MG tablet, Take 1 tablet (40 mg total) by mouth 2 (two) times a day (Patient taking differently: Take 40 mg by mouth daily  ), Disp: 60 tablet, Rfl: 5    gabapentin (NEURONTIN) 100 mg capsule, Take 2 tablets at bedtime, Disp: 60 capsule, Rfl: 5    GLUCAGON EMERGENCY 1 MG injection, INJECT 1MG SUBCUTANEOUSLY AS NEEDED (AS DIRECTED)   MAY REPEAT DOSE EVERY 20 MINUTES AS NEEDED, Disp: , Rfl: 3    hydrOXYzine HCL (ATARAX) 10 mg tablet, Take 1 tablet (10 mg total) by mouth every 6 (six) hours as needed for itching, Disp: 30 tablet, Rfl: 0    insulin aspart (NovoLOG) 100 units/mL injection, Inject 25 Units under the skin Sliding scale, Disp: , Rfl:     insulin glargine (Basaglar KwikPen) 100 units/mL injection pen, Inject 14 Units under the skin daily at bedtime (Patient taking differently: Inject 7 Units under the skin daily at bedtime  ), Disp: , Rfl:     labetalol (NORMODYNE) 200 mg tablet, TAKE 2 TABLETS BY MOUTH THREE TIMES DAILY, Disp: 180 tablet, Rfl: 0    levothyroxine 25 mcg tablet, Take 1 tablet (25 mcg total) by mouth daily, Disp: 30 tablet, Rfl: 1    lisinopril (ZESTRIL) 40 mg tablet, Take 80 mg by mouth daily, Disp: , Rfl:     metolazone (ZAROXOLYN) 10 mg tablet, Take 5 mg by mouth daily  , Disp: , Rfl:     minoxidil (LONITEN) 2 5 mg tablet, Take 2 5 mg by mouth 2 (two) times a day , Disp: , Rfl:     NIFEdipine ER (ADALAT CC) 60 MG 24 hr tablet, Take 1 tablet (60 mg total) by mouth 2 (two) times a day, Disp: 180 tablet, Rfl: 0    ondansetron (ZOFRAN) 4 mg tablet, Take 4 mg by mouth every 8 (eight) hours as needed for nausea or vomiting, Disp: , Rfl:     Patiromer Sorbitex Calcium (VELTASSA PO), Take 5 g by mouth once a week, Disp: , Rfl:     saccharomyces boulardii (FLORASTOR) 250 mg capsule, Take 1 capsule (250 mg total) by mouth 2 (two) times a day (Patient taking differently: Take 250 mg by mouth daily ), Disp: 60 capsule, Rfl: 0    torsemide (DEMADEX) 100 mg tablet, Take 100 mg by mouth daily  , Disp: , Rfl:     Admelog SoloStar 100 units/mL injection pen, Inject 6 Units under the skin 3 (three) times a day with meals  (Patient not taking: Reported on 5/3/2022 ), Disp: , Rfl:     amoxicillin (AMOXIL) 500 mg capsule, TAKE 4 CAPSULES BY MOUTH 1 HOUR PRIOR TO PROCEDURE (Patient not taking: Reported on 4/21/2022), Disp: , Rfl:     dextrose 50 %, 25 mL (Patient not taking: Reported on 5/3/2022 ), Disp: , Rfl:     doxazosin (CARDURA) 8 MG tablet, Take 8 mg by mouth daily at bedtime (Patient not taking: Reported on 5/3/2022 ), Disp: , Rfl:     hydrALAZINE (APRESOLINE) 100 MG tablet, Take 0 5 tablets (50 mg total) by mouth 2 (two) times a day (Patient not taking: Reported on 4/21/2022 ), Disp: 90 tablet, Rfl: 1    insulin lispro (HumaLOG) 100 units/mL injection, Inject 4 Units under the skin 3 (three) times a day with meals (Patient not taking: Reported on 5/3/2022 ), Disp: 10 mL, Rfl: 0    insulin NPH (HumuLIN N,NovoLIN N) 100 Units/mL subcutaneous injection, Inject 6 Units under the skin daily at bedtime   (Patient not taking: Reported on 5/3/2022 ), Disp: , Rfl:     lisinopril (ZESTRIL) 20 mg tablet, Take 1 tablet (20 mg total) by mouth daily (Patient not taking: Reported on 5/3/2022 ), Disp: 30 tablet, Rfl: 0    Lokelma 5 g PACK, see administration instructions Once daily on non dialysis days (Patient not taking: Reported on 4/21/2022 ), Disp: , Rfl:     traZODone (DESYREL) 50 mg tablet, Take 1 tablet (50 mg total) by mouth daily at bedtime as needed for sleep (Patient not taking: Reported on 5/3/2022 ), Disp: 30 tablet, Rfl: 1    Laboratory Results:        Invalid input(s): ALBUMIN    Results for orders placed or performed in visit on 01/19/22   COVID Only- Collected at [facility] or [facility]    Specimen: Nose; Nares   Result Value Ref Range    SARS-CoV-2 Negative Negative

## 2022-05-03 NOTE — PATIENT INSTRUCTIONS
1) will see you back in one year  2) will present your case to our committee in 1-2 weeks and will call with any updates  3) please complete CT scan of chest

## 2022-05-04 ENCOUNTER — TELEPHONE (OUTPATIENT)
Dept: PSYCHIATRY | Facility: CLINIC | Age: 37
End: 2022-05-04

## 2022-05-04 NOTE — TELEPHONE ENCOUNTER
Left a message offering Bill open appointments with María Terrazas on 5/5 and 5/6   Left my name and number in message requesting he call back with any interest

## 2022-05-05 ENCOUNTER — OFFICE VISIT (OUTPATIENT)
Dept: SLEEP CENTER | Facility: CLINIC | Age: 37
End: 2022-05-05
Payer: MEDICARE

## 2022-05-05 VITALS
WEIGHT: 210.2 LBS | HEART RATE: 76 BPM | HEIGHT: 67 IN | BODY MASS INDEX: 32.99 KG/M2 | SYSTOLIC BLOOD PRESSURE: 142 MMHG | DIASTOLIC BLOOD PRESSURE: 70 MMHG | OXYGEN SATURATION: 98 %

## 2022-05-05 DIAGNOSIS — F41.1 GENERALIZED ANXIETY DISORDER: ICD-10-CM

## 2022-05-05 DIAGNOSIS — Z99.2 TYPE 1 DIABETES MELLITUS WITH CHRONIC KIDNEY DISEASE ON CHRONIC DIALYSIS (HCC): ICD-10-CM

## 2022-05-05 DIAGNOSIS — N18.6 ESRD (END STAGE RENAL DISEASE) (HCC): ICD-10-CM

## 2022-05-05 DIAGNOSIS — Z94.89 TRANSPLANT RECIPIENT: ICD-10-CM

## 2022-05-05 DIAGNOSIS — Z86.73 HISTORY OF LACUNAR CEREBROVASCULAR ACCIDENT (CVA): ICD-10-CM

## 2022-05-05 DIAGNOSIS — E10.22 TYPE 1 DIABETES MELLITUS WITH CHRONIC KIDNEY DISEASE ON CHRONIC DIALYSIS (HCC): ICD-10-CM

## 2022-05-05 DIAGNOSIS — N18.6 TYPE 1 DIABETES MELLITUS WITH CHRONIC KIDNEY DISEASE ON CHRONIC DIALYSIS (HCC): ICD-10-CM

## 2022-05-05 DIAGNOSIS — F33.0 MILD EPISODE OF RECURRENT MAJOR DEPRESSIVE DISORDER (HCC): ICD-10-CM

## 2022-05-05 DIAGNOSIS — G47.33 OSA (OBSTRUCTIVE SLEEP APNEA): ICD-10-CM

## 2022-05-05 DIAGNOSIS — I27.20 PULMONARY HYPERTENSION (HCC): ICD-10-CM

## 2022-05-05 DIAGNOSIS — D50.8 OTHER IRON DEFICIENCY ANEMIA: Primary | ICD-10-CM

## 2022-05-05 DIAGNOSIS — I15.0 RENOVASCULAR HYPERTENSION: ICD-10-CM

## 2022-05-05 PROCEDURE — 99203 OFFICE O/P NEW LOW 30 MIN: CPT | Performed by: INTERNAL MEDICINE

## 2022-05-05 NOTE — PROGRESS NOTES
Consultation - Sleep Center   Duane Leak : 1985  MRN: [id number]      Assessment:  The patient has symptoms of obstructive sleep apnea, including snoring and daytime sleepiness  Hypoxia from RACHEAL, could be a contributing cause of his pulmonary hypertension  Plan:  Diagnostic polysomnography    Follow up: After testing    History of Present Illness:   43 y o male with juvenile DM, vasculopathy, ESRD, pulmonary hypertension, systemic hypertension, presents for evaluation for RACHEAL  The patient is undergoing evaluation for renal transplant  He reports snoring and excessive daytime sleepiness  He denies morning headache, but does have dry mouth  He denies awakening with a gasping sensation        Review of Systems      Genitourinary none   Cardiology ankle/leg swelling   Gastrointestinal none   Neurology need to move extremities, numbness/tingling of an extremity and balance problems   Constitutional fatigue and weight change   Integumentary rash or dry skin and itching   Psychiatry anxiety   Musculoskeletal leg cramps   Pulmonary none   ENT none   Endocrine none   Hematological none         I have reviewed and updated the review of systems as necessary    Historical Information    Past Medical History:  Gastroparesis, GERD, DM, hypothyroidism, ESRD, pulmonary HTN, orthostatic hypotension    Family History: non-contributory    Social History     Socioeconomic History    Marital status: Single     Spouse name: Not on file    Number of children: Not on file    Years of education: Not on file    Highest education level: Not on file   Occupational History    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   Tobacco Use    Smoking status: Former Smoker     Packs/day: 0 50     Years: 12 00     Pack years: 6 00     Types: Cigarettes     Quit date: 2018     Years since quittin 2    Smokeless tobacco: Never Used    Tobacco comment: quit 2018   Vaping Use    Vaping Use: Every day    Substances: THC, CBD   Substance and Sexual Activity    Alcohol use: Yes     Comment: occassionally    Drug use: Yes     Types: Marijuana     Comment: medical marijuana    Sexual activity: Not on file   Other Topics Concern    Not on file   Social History Narrative    Caffeine use    single     Social Determinants of Health     Financial Resource Strain: Not on file   Food Insecurity: Not on file   Transportation Needs: Not on file   Physical Activity: Not on file   Stress: Not on file   Social Connections: Not on file   Intimate Partner Violence: Not on file   Housing Stability: Not on file         Sleep Schedule: unremarkable    Snoring:  Yes    Witnessed Apnea:  No    Medications/Allergies:    Current Outpatient Medications:     aspirin (ECOTRIN LOW STRENGTH) 81 mg EC tablet, Take 81 mg by mouth daily, Disp: , Rfl:     atorvastatin (LIPITOR) 40 mg tablet, Take 1 tablet (40 mg total) by mouth every evening, Disp: 90 tablet, Rfl: 1    b complex vitamins capsule, Take 1 capsule by mouth daily before lunch , Disp: , Rfl:     B-D ULTRAFINE III SHORT PEN 31G X 8 MM MISC, USE 1 PEN NEEDLE 8 TIMES DAILY, Disp: 100 each, Rfl: 47    calcium acetate (PHOSLO) capsule, TAKE 3 CAPSULES BY MOUTH WITH MEALS, Disp: , Rfl:     cholecalciferol (VITAMIN D3) 1,000 units tablet, Take 2,000 Units by mouth daily  , Disp: , Rfl:     cinacalcet (SENSIPAR) 60 MG tablet, Take 120 mg by mouth daily 2 tab daily , Disp: , Rfl:     cloNIDine (CATAPRES-TTS-3) 0 3 mg/24 hr, 1 patch once a week , Disp: , Rfl:     doxazosin (CARDURA) 2 mg tablet, TAKE 2 TABLETS BY MOUTH IN THE MORNING AND 2 IN THE EVENING, Disp: 120 tablet, Rfl: 0    escitalopram (LEXAPRO) 10 mg tablet, Take 1 tablet (10 mg total) by mouth daily, Disp: 90 tablet, Rfl: 1    famotidine (PEPCID) 40 MG tablet, Take 1 tablet (40 mg total) by mouth 2 (two) times a day (Patient taking differently: Take 40 mg by mouth daily  ), Disp: 60 tablet, Rfl: 5    gabapentin (NEURONTIN) 100 mg capsule, Take 2 tablets at bedtime, Disp: 60 capsule, Rfl: 5    GLUCAGON EMERGENCY 1 MG injection, INJECT 1MG SUBCUTANEOUSLY AS NEEDED (AS DIRECTED)   MAY REPEAT DOSE EVERY 20 MINUTES AS NEEDED, Disp: , Rfl: 3    hydrOXYzine HCL (ATARAX) 10 mg tablet, Take 1 tablet (10 mg total) by mouth every 6 (six) hours as needed for itching, Disp: 30 tablet, Rfl: 0    insulin aspart (NovoLOG) 100 units/mL injection, Inject 25 Units under the skin Sliding scale, Disp: , Rfl:     insulin glargine (Basaglar KwikPen) 100 units/mL injection pen, Inject 14 Units under the skin daily at bedtime (Patient taking differently: Inject 7 Units under the skin daily at bedtime  ), Disp: , Rfl:     labetalol (NORMODYNE) 200 mg tablet, TAKE 2 TABLETS BY MOUTH THREE TIMES DAILY, Disp: 180 tablet, Rfl: 0    levothyroxine 25 mcg tablet, Take 1 tablet (25 mcg total) by mouth daily, Disp: 30 tablet, Rfl: 1    lisinopril (ZESTRIL) 40 mg tablet, Take 80 mg by mouth daily, Disp: , Rfl:     metolazone (ZAROXOLYN) 10 mg tablet, Take 5 mg by mouth daily  , Disp: , Rfl:     minoxidil (LONITEN) 2 5 mg tablet, Take 2 5 mg by mouth 2 (two) times a day , Disp: , Rfl:     NIFEdipine ER (ADALAT CC) 60 MG 24 hr tablet, Take 1 tablet (60 mg total) by mouth 2 (two) times a day, Disp: 180 tablet, Rfl: 0    ondansetron (ZOFRAN) 4 mg tablet, Take 4 mg by mouth every 8 (eight) hours as needed for nausea or vomiting, Disp: , Rfl:     Patiromer Sorbitex Calcium (VELTASSA PO), Take 5 g by mouth once a week, Disp: , Rfl:     saccharomyces boulardii (FLORASTOR) 250 mg capsule, Take 1 capsule (250 mg total) by mouth 2 (two) times a day (Patient taking differently: Take 250 mg by mouth daily ), Disp: 60 capsule, Rfl: 0    torsemide (DEMADEX) 100 mg tablet, Take 100 mg by mouth daily  , Disp: , Rfl:     Admelog SoloStar 100 units/mL injection pen, Inject 6 Units under the skin 3 (three) times a day with meals  (Patient not taking: Reported on 5/3/2022 ), Disp: , Rfl:     amoxicillin (AMOXIL) 500 mg capsule, TAKE 4 CAPSULES BY MOUTH 1 HOUR PRIOR TO PROCEDURE (Patient not taking: Reported on 4/21/2022), Disp: , Rfl:     dextrose 50 %, 25 mL (Patient not taking: Reported on 5/3/2022 ), Disp: , Rfl:     doxazosin (CARDURA) 8 MG tablet, Take 8 mg by mouth daily at bedtime (Patient not taking: Reported on 5/3/2022 ), Disp: , Rfl:     hydrALAZINE (APRESOLINE) 100 MG tablet, Take 0 5 tablets (50 mg total) by mouth 2 (two) times a day (Patient not taking: Reported on 4/21/2022 ), Disp: 90 tablet, Rfl: 1    insulin lispro (HumaLOG) 100 units/mL injection, Inject 4 Units under the skin 3 (three) times a day with meals (Patient not taking: Reported on 5/3/2022 ), Disp: 10 mL, Rfl: 0    insulin NPH (HumuLIN N,NovoLIN N) 100 Units/mL subcutaneous injection, Inject 6 Units under the skin daily at bedtime   (Patient not taking: Reported on 5/3/2022 ), Disp: , Rfl:     lisinopril (ZESTRIL) 20 mg tablet, Take 1 tablet (20 mg total) by mouth daily (Patient not taking: Reported on 5/3/2022 ), Disp: 30 tablet, Rfl: 0    Lokelma 5 g PACK, see administration instructions Once daily on non dialysis days (Patient not taking: Reported on 4/21/2022 ), Disp: , Rfl:     traZODone (DESYREL) 50 mg tablet, Take 1 tablet (50 mg total) by mouth daily at bedtime as needed for sleep (Patient not taking: Reported on 5/3/2022 ), Disp: 30 tablet, Rfl: 1        No notes on file                  Objective:    Vital Signs:   Vitals:    05/05/22 1000   BP: 142/70   Pulse: 76   SpO2: 98%   Weight: 95 3 kg (210 lb 3 2 oz)   Height: 5' 7" (1 702 m)     Neck Circumference: 17 5      [place] Sleepiness Scale:  Total score: 13    Physical Exam:    General: Alert, appropriate, cooperative, overeight    Head: NC/AT, no retrognathia    Nose: No septal deviation, nares not obstructed, mucosa normal    Throat: Airway diminished, tongue base thickened, no tonsils visualized    Neck: Normal, no thyromegaly or lymphadenopathy, no JVD    Heart: RR, normal S1 and S2, no murmurs    Chest: Clear bilaterally    Extremity: No clubbing, cyanosis, no edema    Skin: Warm, dry    Neuro: No motor abnormalities, cranial nerves appear intact        Counseling / Coordination of Care  A description of the counseling / coordination of care: discussed pathophysiology and treatment of RACHEAL  Board Certified Sleep Specialist    Portions of the record may have been created with voice recognition software  Occasional wrong word or "sound a like" substitutions may have occurred due to the inherent limitations of voice recognition software  Read the chart carefully and recognize, using context, where substitutions have occurred

## 2022-05-10 ENCOUNTER — HOSPITAL ENCOUNTER (OUTPATIENT)
Dept: CT IMAGING | Facility: HOSPITAL | Age: 37
Discharge: HOME | End: 2022-05-10
Attending: INTERNAL MEDICINE
Payer: MEDICARE

## 2022-05-10 ENCOUNTER — TELEPHONE (OUTPATIENT)
Dept: PSYCHIATRY | Facility: CLINIC | Age: 37
End: 2022-05-10

## 2022-05-10 DIAGNOSIS — R91.8 LUNG NODULES: ICD-10-CM

## 2022-05-10 PROCEDURE — 71250 CT THORAX DX C-: CPT

## 2022-05-10 NOTE — TELEPHONE ENCOUNTER
Pt father called and stated that they are at [facility] waiting on a ct scan is unable to make the appt today   The father or pt stated that they will call back to reschedule

## 2022-05-11 ENCOUNTER — TELEPHONE (OUTPATIENT)
Dept: NEPHROLOGY | Facility: CLINIC | Age: 37
End: 2022-05-11

## 2022-05-11 NOTE — TELEPHONE ENCOUNTER
Copy of CT scan forwarded directly to Dr Concepcion Robledo via [address] [place] [address]

## 2022-05-11 NOTE — TELEPHONE ENCOUNTER
Mr Pao Iniguez 1985 - pt had CT scan completed of chest to f/u lung nodule  stable pulm nodule 8 mm  no new nodule  coronary art calcification  degenerative disc disease throughout thoracic spine  Dr Candido Zimmerman - you saw pt for pulm clearance for renal transplant in oct 2022   could you review CT scan pt had completed to see if anything further we need to do  Coordinator team, can you please obtain films for upload to [facility] for Dr Candido Zimmerman to review  can you also please let pt know the results above  Thank youted to see if anything further we need to do  Coordinator team, can you please obtain films for upload to [facility] for Dr Candido Zimmerman to review  can you also please let pt know the results above  Thank you    Above message sent to [facility] team and pulm team at [facility] for review

## 2022-05-12 ENCOUNTER — OFFICE VISIT (OUTPATIENT)
Dept: INTERNAL MEDICINE CLINIC | Facility: CLINIC | Age: 37
End: 2022-05-12
Payer: MEDICARE

## 2022-05-12 ENCOUNTER — APPOINTMENT (OUTPATIENT)
Dept: LAB | Facility: CLINIC | Age: 37
End: 2022-05-12
Payer: MEDICARE

## 2022-05-12 VITALS
HEART RATE: 60 BPM | BODY MASS INDEX: 29.37 KG/M2 | HEIGHT: 71 IN | DIASTOLIC BLOOD PRESSURE: 84 MMHG | OXYGEN SATURATION: 97 % | TEMPERATURE: 98.1 F | SYSTOLIC BLOOD PRESSURE: 130 MMHG | WEIGHT: 209.8 LBS

## 2022-05-12 DIAGNOSIS — Z95.818 STATUS POST PLACEMENT OF IMPLANTABLE LOOP RECORDER: ICD-10-CM

## 2022-05-12 DIAGNOSIS — R29.818 SUSPECTED SLEEP APNEA: ICD-10-CM

## 2022-05-12 DIAGNOSIS — E03.9 HYPOTHYROIDISM, UNSPECIFIED TYPE: Primary | ICD-10-CM

## 2022-05-12 DIAGNOSIS — E03.9 HYPOTHYROIDISM, UNSPECIFIED TYPE: ICD-10-CM

## 2022-05-12 DIAGNOSIS — E55.9 VITAMIN D DEFICIENCY: Chronic | ICD-10-CM

## 2022-05-12 DIAGNOSIS — D50.8 OTHER IRON DEFICIENCY ANEMIA: ICD-10-CM

## 2022-05-12 DIAGNOSIS — R91.8 MULTIPLE PULMONARY NODULES: ICD-10-CM

## 2022-05-12 PROBLEM — E46 PROTEIN-CALORIE MALNUTRITION (HCC): Status: RESOLVED | Noted: 2020-11-23 | Resolved: 2022-05-12

## 2022-05-12 PROBLEM — R19.7 DIARRHEA: Status: RESOLVED | Noted: 2018-11-09 | Resolved: 2022-05-12

## 2022-05-12 LAB
FERRITIN SERPL-MCNC: 875 NG/ML (ref 8–388)
TSH SERPL DL<=0.05 MIU/L-ACNC: 4.17 UIU/ML (ref 0.45–4.5)

## 2022-05-12 PROCEDURE — 99214 OFFICE O/P EST MOD 30 MIN: CPT | Performed by: INTERNAL MEDICINE

## 2022-05-12 PROCEDURE — 86800 THYROGLOBULIN ANTIBODY: CPT

## 2022-05-12 PROCEDURE — 82728 ASSAY OF FERRITIN: CPT

## 2022-05-12 PROCEDURE — 36415 COLL VENOUS BLD VENIPUNCTURE: CPT

## 2022-05-12 PROCEDURE — 86376 MICROSOMAL ANTIBODY EACH: CPT

## 2022-05-12 PROCEDURE — 84443 ASSAY THYROID STIM HORMONE: CPT

## 2022-05-12 NOTE — PROGRESS NOTES
Assessment/Plan:    Problem List Items Addressed This Visit        Endocrine    Hypothyroidism - Primary     -TSH normal  -continue levothyroxine 25mcg daily              Other    Vitamin D deficiency (Chronic)     -sufficient  -continue maintenance vitamin D3           Multiple pulmonary nodules     -stable multiple pulm nodules with dominant 8mm LLL  -plan for repeat CT chest 9/2023           Status post placement of implantable loop recorder     -placed 5/2018 due to h/o strokes  -no events captured  -family anxious for removal of recorder, will contact Cardio             Other Visit Diagnoses     Suspected sleep apnea        -scheduled for diagnostic PSG 7/2022  -c/o snoring, daytime sleepiness          Subjective:      Patient ID: Starla Worthington is a 43 y o  male  HPI  41yo male with DM1 with neuropathy, gastroparesis, ESRD on HD, nonobstructive CAD, renovascular HTN with h/o HTN, MDD, ELIAS, anemia, vitamin D def and pulm nodules here for follow up care  He is scheduled for diagnostic polysomnogram 7/6/22  He snores and has daytime sleepiness  Had surveillance CT chest of pulmonary nodules that showed stability with dominant nodule LLL at 8mm  He denies SOB,cough, wheeze  He has not received his supply of levothyroxine and has not had it for one week but should be on its way  Sometimes takes trazodone 25mg qhs due to difficulty falling asleep at times      The following portions of the patient's history were reviewed and updated as appropriate: allergies, current medications, past family history, past medical history, past social history, past surgical history and problem list       Current Outpatient Medications:     atorvastatin (LIPITOR) 40 mg tablet, Take 1 tablet (40 mg total) by mouth every evening, Disp: 90 tablet, Rfl: 1    escitalopram (LEXAPRO) 10 mg tablet, Take 1 tablet (10 mg total) by mouth daily, Disp: 90 tablet, Rfl: 1    famotidine (PEPCID) 40 MG tablet, Take 1 tablet (40 mg total) by mouth 2 (two) times a day (Patient taking differently: Take 40 mg by mouth in the morning ), Disp: 60 tablet, Rfl: 5    gabapentin (NEURONTIN) 100 mg capsule, Take 2 tablets at bedtime, Disp: 60 capsule, Rfl: 5    GLUCAGON EMERGENCY 1 MG injection, INJECT 1MG SUBCUTANEOUSLY AS NEEDED (AS DIRECTED)   MAY REPEAT DOSE EVERY 20 MINUTES AS NEEDED, Disp: , Rfl: 3    hydrOXYzine HCL (ATARAX) 10 mg tablet, Take 1 tablet (10 mg total) by mouth every 6 (six) hours as needed for itching, Disp: 30 tablet, Rfl: 0    insulin aspart (NovoLOG) 100 units/mL injection, Inject 25 Units under the skin Sliding scale, Disp: , Rfl:     insulin glargine (Basaglar KwikPen) 100 units/mL injection pen, Inject 14 Units under the skin daily at bedtime (Patient taking differently: Inject 7 Units under the skin daily at bedtime), Disp: , Rfl:     labetalol (NORMODYNE) 200 mg tablet, TAKE 2 TABLETS BY MOUTH THREE TIMES DAILY, Disp: 180 tablet, Rfl: 0    levothyroxine 25 mcg tablet, Take 1 tablet (25 mcg total) by mouth daily, Disp: 30 tablet, Rfl: 1    lisinopril (ZESTRIL) 40 mg tablet, Take 80 mg by mouth daily, Disp: , Rfl:     metolazone (ZAROXOLYN) 10 mg tablet, Take 5 mg by mouth daily  , Disp: , Rfl:     minoxidil (LONITEN) 2 5 mg tablet, Take 2 5 mg by mouth 2 (two) times a day , Disp: , Rfl:     NIFEdipine ER (ADALAT CC) 60 MG 24 hr tablet, Take 1 tablet (60 mg total) by mouth 2 (two) times a day, Disp: 180 tablet, Rfl: 0    ondansetron (ZOFRAN) 4 mg tablet, Take 4 mg by mouth every 8 (eight) hours as needed for nausea or vomiting, Disp: , Rfl:     Patiromer Sorbitex Calcium (VELTASSA PO), Take 5 g by mouth once a week, Disp: , Rfl:     torsemide (DEMADEX) 100 mg tablet, Take 100 mg by mouth daily  , Disp: , Rfl:     traZODone (DESYREL) 50 mg tablet, Take 1 tablet (50 mg total) by mouth daily at bedtime as needed for sleep (Patient taking differently: Take 25 mg by mouth daily at bedtime as needed for sleep), Disp: 30 tablet, Rfl: 1    Admelog SoloStar 100 units/mL injection pen, Inject 6 Units under the skin 3 (three) times a day with meals  (Patient not taking: No sig reported), Disp: , Rfl:     amoxicillin (AMOXIL) 500 mg capsule, TAKE 4 CAPSULES BY MOUTH 1 HOUR PRIOR TO PROCEDURE (Patient not taking: No sig reported), Disp: , Rfl:     aspirin (ECOTRIN LOW STRENGTH) 81 mg EC tablet, Take 81 mg by mouth daily, Disp: , Rfl:     b complex vitamins capsule, Take 1 capsule by mouth daily before lunch , Disp: , Rfl:     B-D ULTRAFINE III SHORT PEN 31G X 8 MM MISC, USE 1 PEN NEEDLE 8 TIMES DAILY, Disp: 100 each, Rfl: 47    calcium acetate (PHOSLO) capsule, TAKE 3 CAPSULES BY MOUTH WITH MEALS, Disp: , Rfl:     cholecalciferol (VITAMIN D3) 1,000 units tablet, Take 2,000 Units by mouth daily  , Disp: , Rfl:     cinacalcet (SENSIPAR) 60 MG tablet, Take 120 mg by mouth daily 2 tab daily , Disp: , Rfl:     cloNIDine (CATAPRES-TTS-3) 0 3 mg/24 hr, 1 patch once a week , Disp: , Rfl:     dextrose 50 %, 25 mL (Patient not taking: No sig reported), Disp: , Rfl:     doxazosin (CARDURA) 2 mg tablet, TAKE 2 TABLETS BY MOUTH IN THE MORNING AND 2 IN THE EVENING, Disp: 120 tablet, Rfl: 0    doxazosin (CARDURA) 8 MG tablet, Take 8 mg by mouth daily at bedtime (Patient not taking: No sig reported), Disp: , Rfl:     hydrALAZINE (APRESOLINE) 100 MG tablet, Take 0 5 tablets (50 mg total) by mouth 2 (two) times a day (Patient not taking: No sig reported), Disp: 90 tablet, Rfl: 1    Review of Systems   Constitutional: Negative for activity change, appetite change and unexpected weight change  HENT: Positive for dental problem  Respiratory: Negative for cough, chest tightness, shortness of breath and wheezing  Cardiovascular: Negative for chest pain, palpitations and leg swelling  Neurological: Positive for dizziness and headaches           Objective:    /84 (BP Location: Right arm, Patient Position: Sitting)   Pulse 60 Temp 98 1 °F (36 7 °C) (Tympanic)   Ht 5' 11" (1 803 m)   Wt 95 2 kg (209 lb 12 8 oz)   SpO2 97%   BMI 29 26 kg/m²      Physical Exam  Vitals reviewed  Cardiovascular:      Rate and Rhythm: Normal rate and regular rhythm  Pulses: Normal pulses  Heart sounds: Normal heart sounds  Pulmonary:      Effort: Pulmonary effort is normal  No respiratory distress  Breath sounds: Normal breath sounds  No wheezing  Musculoskeletal:      Right lower leg: No edema  Left lower leg: No edema  Skin:     Findings: Rash present  Comments: Multiple excoriations on BL forearm   Neurological:      Mental Status: He is alert and oriented to person, place, and time  Labs from 3/2022 were reviewed and discussed with the patient

## 2022-05-12 NOTE — ASSESSMENT & PLAN NOTE
-placed 5/2018 due to h/o strokes  -no events captured  -family anxious for removal of recorder, will contact Cardio

## 2022-05-13 LAB
THYROGLOB AB SERPL-ACNC: <1 IU/ML (ref 0–0.9)
THYROPEROXIDASE AB SERPL-ACNC: <8 IU/ML (ref 0–34)

## 2022-05-16 DIAGNOSIS — R42 VERTIGO: Primary | ICD-10-CM

## 2022-05-16 RX ORDER — ONDANSETRON 4 MG/1
4 TABLET, FILM COATED ORAL EVERY 8 HOURS PRN
Qty: 90 TABLET | Refills: 0 | Status: SHIPPED | OUTPATIENT
Start: 2022-05-16

## 2022-05-19 ENCOUNTER — TELEPHONE (OUTPATIENT)
Dept: PSYCHIATRY | Facility: CLINIC | Age: 37
End: 2022-05-19

## 2022-05-19 ENCOUNTER — SOCIAL WORK (OUTPATIENT)
Dept: BEHAVIORAL/MENTAL HEALTH CLINIC | Facility: CLINIC | Age: 37
End: 2022-05-19
Payer: MEDICARE

## 2022-05-19 DIAGNOSIS — G47.00 INSOMNIA, UNSPECIFIED TYPE: ICD-10-CM

## 2022-05-19 DIAGNOSIS — F33.0 MILD EPISODE OF RECURRENT MAJOR DEPRESSIVE DISORDER (HCC): Primary | ICD-10-CM

## 2022-05-19 DIAGNOSIS — F41.1 GENERALIZED ANXIETY DISORDER: ICD-10-CM

## 2022-05-19 PROCEDURE — 90834 PSYTX W PT 45 MINUTES: CPT | Performed by: SOCIAL WORKER

## 2022-05-19 NOTE — PSYCH
Psychotherapy Provided: Individual Psychotherapy 45 minutes     Length of time in session: 45 minutes, follow up in 2 week    ELIAS, Mild recurrent major depression    Goals addressed in session: Goal 1, Goal 2 and Goal 3      Pain:      moderate to severe    5    Current suicide risk : Low     Data: Belkis Nguyen is in a [address] now has called him after in the past they gave him a hard time  He isnt sure to ignore or call but if he calls he feels he will go off  So we discussed bringing his mom in a session since she told him not to burn bridges  Assessment: Belkis Nguyen denies suicidal/homicidal ideation, plan or intent  He is conflicted about whether to get back to [facility]  He discussed his up and down depression and anxiety over his 2 strokes and multiple health issues  Regarding his relationship with his parents he claims they have their ups and downs  He continues to cope with multiple health issues  We continue to work on mindfulness and CBT  Plan: Continue to skill build in distress tolerance  Behavioral Health Treatment Plan [facility]: Diagnosis and Treatment Plan explained to Jesi Nichole relates understanding diagnosis and is agreeable to Treatment Plan   Yes

## 2022-05-19 NOTE — TELEPHONE ENCOUNTER
Spoke to patient and offered him an open appointment for today, 5/19/22 at 3pm with Josiane Cruz  He said he would call back to confirm whether he can come in  Gave him office number

## 2022-05-25 ENCOUNTER — TELEPHONE (OUTPATIENT)
Dept: NEPHROLOGY | Facility: CLINIC | Age: 37
End: 2022-05-25

## 2022-05-25 NOTE — TELEPHONE ENCOUNTER
Patient's mother called the office wanting to go over CT scan results and also wanted to know he is still eligible for a transplant? Please advise

## 2022-05-27 NOTE — TELEPHONE ENCOUNTER
Hello    Please let pt know that it showed stable nodules but nothing to do differently for now   We were reaching out to his pulm who saw him if they wanted anything different and will get back to him    Thank you

## 2022-05-31 ENCOUNTER — HOSPITAL ENCOUNTER (EMERGENCY)
Facility: HOSPITAL | Age: 37
Discharge: HOME | End: 2022-05-31
Attending: EMERGENCY MEDICINE
Payer: MEDICARE

## 2022-05-31 VITALS
TEMPERATURE: 98.3 F | SYSTOLIC BLOOD PRESSURE: 187 MMHG | BODY MASS INDEX: 29.26 KG/M2 | OXYGEN SATURATION: 99 % | RESPIRATION RATE: 17 BRPM | WEIGHT: 209 LBS | HEIGHT: 71 IN | HEART RATE: 80 BPM | DIASTOLIC BLOOD PRESSURE: 96 MMHG

## 2022-05-31 DIAGNOSIS — H92.01 OTALGIA OF RIGHT EAR: ICD-10-CM

## 2022-05-31 DIAGNOSIS — H60.91 RIGHT OTITIS EXTERNA: Primary | ICD-10-CM

## 2022-05-31 PROCEDURE — 99282 EMERGENCY DEPT VISIT SF MDM: CPT

## 2022-05-31 PROCEDURE — 99284 EMERGENCY DEPT VISIT MOD MDM: CPT | Performed by: PHYSICIAN ASSISTANT

## 2022-05-31 RX ORDER — OFLOXACIN 3 MG/ML
10 SOLUTION AURICULAR (OTIC) DAILY
Qty: 5 ML | Refills: 0 | Status: SHIPPED | OUTPATIENT
Start: 2022-05-31

## 2022-05-31 RX ORDER — AMOXICILLIN 500 MG/1
500 CAPSULE ORAL EVERY 12 HOURS SCHEDULED
Qty: 14 CAPSULE | Refills: 0 | Status: SHIPPED | OUTPATIENT
Start: 2022-05-31 | End: 2022-06-07

## 2022-05-31 NOTE — ED PROVIDER NOTES
History  Chief Complaint   Patient presents with    Earache     Pt reports R earache since yesterday, states hurt worse yesterday, just muffled hearing today on that side, denies fevers      Arthea Levi is a 47-year-old male with past medical history including diabetes and esrd on ihd presenting to the emergency department for evaluation with chief complaint of right ear pain  Patient reports initial onset of some discomfort 2 days ago though stating that last night this had started to become more painful  He does report prior history of ear infections admitting that his symptoms are similar  He denies any drainage from the ear  He denies recent history of swimming also denying use of Q-tips  He denies tinnitus or hearing loss  He reports that he has no symptoms involving the left ear  He has no accompanying constitutional symptoms denying any fevers, chills, sweats, headache, nasal congestion or rhinorrhea, cough  He offers no other complaints or concerns at this time  Prior to Admission Medications   Prescriptions Last Dose Informant Patient Reported? Taking? Admelog SoloStar 100 units/mL injection pen  Self Yes No   Sig: Inject 6 Units under the skin 3 (three) times a day with meals    Patient not taking: No sig reported   B-D ULTRAFINE III SHORT PEN 31G X 8 MM MISC  Self No No   Sig: USE 1 PEN NEEDLE 8 TIMES DAILY   GLUCAGON EMERGENCY 1 MG injection  Self Yes No   Sig: INJECT 1MG SUBCUTANEOUSLY AS NEEDED (AS DIRECTED)   MAY REPEAT DOSE EVERY 20 MINUTES AS NEEDED   NIFEdipine ER (ADALAT CC) 60 MG 24 hr tablet  Self No No   Sig: Take 1 tablet (60 mg total) by mouth 2 (two) times a day   Patiromer Sorbitex Calcium (VELTASSA PO)  Self Yes No   Sig: Take 5 g by mouth once a week   amoxicillin (AMOXIL) 500 mg capsule  Self Yes No   Sig: TAKE 4 CAPSULES BY MOUTH 1 HOUR PRIOR TO PROCEDURE   Patient not taking: No sig reported   aspirin (ECOTRIN LOW STRENGTH) 81 mg EC tablet  Self Yes No Sig: Take 81 mg by mouth daily   atorvastatin (LIPITOR) 40 mg tablet  Self No No   Sig: Take 1 tablet (40 mg total) by mouth every evening   b complex vitamins capsule  Self Yes No   Sig: Take 1 capsule by mouth daily before lunch    calcium acetate (PHOSLO) capsule  Self Yes No   Sig: TAKE 3 CAPSULES BY MOUTH WITH MEALS   cholecalciferol (VITAMIN D3) 1,000 units tablet  Self Yes No   Sig: Take 2,000 Units by mouth daily     cinacalcet (SENSIPAR) 60 MG tablet  Self Yes No   Sig: Take 120 mg by mouth daily 2 tab daily    cloNIDine (CATAPRES-TTS-3) 0 3 mg/24 hr  Self Yes No   Si patch once a week    dextrose 50 %  Self Yes No   Si mL   Patient not taking: No sig reported   doxazosin (CARDURA) 2 mg tablet  Self No No   Sig: TAKE 2 TABLETS BY MOUTH IN THE MORNING AND 2 IN THE EVENING   doxazosin (CARDURA) 8 MG tablet  Self Yes No   Sig: Take 8 mg by mouth daily at bedtime   Patient not taking: No sig reported   escitalopram (LEXAPRO) 10 mg tablet  Self No No   Sig: Take 1 tablet (10 mg total) by mouth daily   famotidine (PEPCID) 40 MG tablet  Self No No   Sig: Take 1 tablet (40 mg total) by mouth 2 (two) times a day   Patient taking differently: Take 40 mg by mouth in the morning    gabapentin (NEURONTIN) 100 mg capsule  Self No No   Sig: Take 2 tablets at bedtime   hydrALAZINE (APRESOLINE) 100 MG tablet  Self No No   Sig: Take 0 5 tablets (50 mg total) by mouth 2 (two) times a day   Patient not taking: No sig reported   hydrOXYzine HCL (ATARAX) 10 mg tablet  Self No No   Sig: Take 1 tablet (10 mg total) by mouth every 6 (six) hours as needed for itching   insulin aspart (NovoLOG) 100 units/mL injection  Self Yes No   Sig: Inject 25 Units under the skin Sliding scale   insulin glargine (Basaglar KwikPen) 100 units/mL injection pen  Self No No   Sig: Inject 14 Units under the skin daily at bedtime   Patient taking differently: Inject 7 Units under the skin daily at bedtime   labetalol (NORMODYNE) 200 mg tablet Self No No   Sig: TAKE 2 TABLETS BY MOUTH THREE TIMES DAILY   levothyroxine 25 mcg tablet  Self No No   Sig: Take 1 tablet (25 mcg total) by mouth daily   lisinopril (ZESTRIL) 40 mg tablet  Self Yes No   Sig: Take 80 mg by mouth daily   metolazone (ZAROXOLYN) 10 mg tablet  Self Yes No   Sig: Take 5 mg by mouth daily     minoxidil (LONITEN) 2 5 mg tablet  Self Yes No   Sig: Take 2 5 mg by mouth 2 (two) times a day    ondansetron (ZOFRAN) 4 mg tablet   No No   Sig: Take 1 tablet (4 mg total) by mouth every 8 (eight) hours as needed for nausea or vomiting   torsemide (DEMADEX) 100 mg tablet  Self Yes No   Sig: Take 100 mg by mouth daily     traZODone (DESYREL) 50 mg tablet  Self No No   Sig: Take 1 tablet (50 mg total) by mouth daily at bedtime as needed for sleep   Patient taking differently: Take 25 mg by mouth daily at bedtime as needed for sleep      Facility-Administered Medications: None       Past Medical History:   Diagnosis Date    [facility] kidney [facility] ([facility] [address] [facility]     Ambulates with cane     Anuria     Anxiety     Cellulitis of right elbow 3/31/2021    Chronic kidney disease     Depression     Diabetes mellitus ([facility] [address] )     Diarrhea     Emesis 10/24/2020    End stage renal disease ([facility] [address] ) 2/11/2018    Formatting of this note might be different from the original  Last Assessment & Plan:  Secondary to DM  On nightly PD  Followed by [facility]    Patient considering transplant for kidney and pancreas through [address] of this note might be different from the original  Last Assessment & Plan:  Formatting of this note might be different from the original  Lab Results  Component Value Date   EGFR     Falls     Gastroparesis     GERD (gastroesophageal reflux disease)     History of shingles 2010    History of transfusion 02/2018    no adverse reaction    Hyperlipidemia     Hyperphosphatemia     Hypertension     Itching     Muscle weakness     general unsteadiness    Obesity (BMI 30 0-34 9) 9/9/2019    PONV (postoperative nausea and vomiting) 1/26/2018    Protein-calorie malnutrition ([facility] [address] ) 11/23/2020    Recurrent peritonitis ([facility] [address] ) due to peritoneal dialysis catheter 7/31/2020    Retinopathy     Seizures ([facility] [address] )     early 2020 - one time    Skin abnormality     some dime size areas where skin was scratched from itching    Spontaneous bacterial peritonitis ([facility] [address] ) 10/19/2020    Stroke ([facility] [address] )     x2 - off balance/no driving/fatigue    Swelling of both lower extremities     Vomiting     Wears glasses        Past Surgical History:   Procedure Laterality Date    CARDIAC LOOP RECORDER  05/2018    COLONOSCOPY      EGD      EYE SURGERY Right     IR AV FISTULAGRAM/GRAFTOGRAM  2/23/2021    IR TUNNELED CENTRAL LINE PLACEMENT  2/16/2021    IR TUNNELED DIALYSIS CATHETER PLACEMENT  11/18/2020    IR TUNNELED DIALYSIS CATHETER REMOVAL  2/12/2021    IR TUNNELED DIALYSIS CATHETER REMOVAL  3/11/2021    PERITONEAL CATHETER INSERTION N/A 8/27/2018    Procedure: UNROOF PD CATHETER;  Surgeon: Brenda Harrington DO;  [facility];  Service: General    MD ANASTOMOSIS,AV,ANY SITE Left 11/9/2020    Procedure: CREATION FISTULA  ARTERIOVENOUS (AV) - LEFT WRIST;  Surgeon: Tata Mcintosh MD;  [facility];  Service: Vascular    MD ESOPHAGOGASTRODUODENOSCOPY TRANSORAL DIAGNOSTIC N/A 4/18/2019    Procedure: ESOPHAGOGASTRODUODENOSCOPY (EGD); Surgeon: Deidre Moon MD;  [facility]: [facility];   Service: Gastroenterology    MD LAP INSERTION TUNNELED INTRAPERITONEAL CATHETER N/A 8/6/2018    Procedure: LAPAROSCOPIC PD CATHETER PLACEMENT;  Surgeon: Brenda Harrington DO;  [facility];  Service: General    MD REMOVAL TUNNELED INTRAPERITONEAL CATHETER N/A 11/18/2020    Procedure: REMOVAL CATHETER PERITONEAL DIALYSIS;  Surgeon: Suki Sin MD;  [facility];  Service: General    TONSILLECTOMY      UPPER GASTROINTESTINAL ENDOSCOPY         Family History   Problem Relation Age of Onset   Wanda Neri Breast cancer Mother  Hypertension Mother     Hyperlipidemia Father     Hypertension Father     Leukemia Maternal Grandmother     Hyperlipidemia Maternal Grandfather     Hypertension Maternal Grandfather     Hyperlipidemia Paternal Grandmother     Hypertension Paternal Grandmother     Heart disease Paternal Grandfather         cardiac disorder    Diabetes Paternal Grandfather      I have reviewed and agree with the history as documented  E-Cigarette/Vaping    E-Cigarette Use Current Every Day User     Comments medical marijuana      E-Cigarette/Vaping Substances    Nicotine No     THC Yes     CBD Yes     Flavoring No     Other No     Unknown No      Social History     Tobacco Use    Smoking status: Former Smoker     Packs/day: 0 50     Years: 12 00     Pack years: 6 00     Types: Cigarettes     Quit date: 2018     Years since quittin 3    Smokeless tobacco: Never Used    Tobacco comment: quit 2018   Vaping Use    Vaping Use: Every day    Substances: THC, CBD   Substance Use Topics    Alcohol use: Yes     Comment: occassionally    Drug use: Yes     Types: Marijuana     Comment: medical marijuana       Review of Systems   Constitutional: Negative for chills, diaphoresis, fatigue and fever  HENT: Positive for ear pain (Right)  All other systems reviewed and are negative  Physical Exam  Physical Exam  Vitals and nursing note reviewed  Constitutional:       General: He is not in acute distress  Appearance: Normal appearance  He is well-developed  He is not ill-appearing, toxic-appearing or diaphoretic  Comments: Very pleasant 43year-old male, non-toxic in appearance and in no acute distress  HENT:      Head: Normocephalic and atraumatic  Right Ear: No decreased hearing noted  No drainage  There is impacted cerumen  No mastoid tenderness  Left Ear: External ear normal  No decreased hearing noted  No drainage  There is impacted cerumen  No mastoid tenderness        Ears: Comments: There is mild swelling involving the tragus of the right ear, as well as mild edema involving the right auditory canal   There is no pain with auricular movement, and remainder of the right auricle is unremarkable  There is no pre/postauricular lymphadenopathy, mastoid tenderness, or overlying skin changes  Bilateral cerumen impactions limit examination and there are no visualized portions of the tympanic membrane to exclude concomitant otitis media of the right ear  Eyes:      Conjunctiva/sclera: Conjunctivae normal    Cardiovascular:      Rate and Rhythm: Normal rate  Pulmonary:      Effort: Pulmonary effort is normal    Musculoskeletal:         General: Normal range of motion  Cervical back: Normal range of motion and neck supple  Skin:     General: Skin is warm and dry  Capillary Refill: Capillary refill takes less than 2 seconds  Findings: No rash  Neurological:      Mental Status: He is alert  Motor: Motor function is intact  No weakness  Gait: Gait is intact  Psychiatric:         Mood and Affect: Mood normal          Vital Signs  ED Triage Vitals [05/31/22 1019]   Temperature Pulse Respirations Blood Pressure SpO2   98 3 °F (36 8 °C) 80 17 (!) 187/96 99 %      Temp Source Heart Rate Source Patient Position - Orthostatic VS BP Location FiO2 (%)   Oral Monitor Sitting Right arm --      Pain Score       3           Vitals:    05/31/22 1019   BP: (!) 187/96   Pulse: 80   Patient Position - Orthostatic VS: Sitting         Visual Acuity      ED Medications  Medications - No data to display    Diagnostic Studies  Results Reviewed     None                 No orders to display              Procedures  Procedures         ED Course                               SBIRT 20yo+    Flowsheet Row Most Recent Value   SBIRT (25 yo +)    In order to provide better care to our patients, we are screening all of our patients for alcohol and drug use   Would it be okay to ask you these screening questions? No Filed at: 05/31/2022 1044                    [facility]  Number of Diagnoses or Management Options  Otalgia of right ear  Right otitis externa: new and does not require workup  Diagnosis management comments: MDM:  39-year-old gentleman presenting for evaluation with complaint of right ear pain x 2 days  Atraumatic in onset, with patient's finding that symptoms are similar to prior infections of the right ear  He has no symptoms involving the left ear  He has no accompanying constitutional complaints  The patient's vital signs on presentation demonstrate an elevated blood pressure reading though otherwise unremarkable  Examination of the right ear is notable for mild edema involving the right auricular canal, though cerumen impactions bilaterally to limit assessment of the tympanic membranes bilaterally  Patient made aware that his presentation does appear most consistent with otitis externa of the right ear though I am unable to exclude concomitant otitis media  Will discharge with scripts for ofloxacin drops for the patient to take as directed, also with recommendation to use Debrox drops for management of cerumen impactions  The patient does admit to previous use of Debrox drops but states that he had only used them for about 3 days and did not appreciate much improvement with this, and we discussed that he would likely need some component of maintenance therapy with this  He states that he has this at home and will use as directed  The patient was also prescribed a script for amoxicillin and he was encouraged to begin if no improvement with ear drops over the next 48-72 hours  Primary care follow-up encouraged for reassessment and repeat examination early next week, and parameters for ED return discussed at length  The patient had verbalized understanding and was comfortable and agreeable with disposition and care plan    He was discharged in stable condition and ambulated independently from the emergency department without issue  Amount and/or Complexity of Data Reviewed  Review and summarize past medical records: yes  Independent visualization of images, tracings, or specimens: yes    Risk of Complications, Morbidity, and/or Mortality  Presenting problems: low  Diagnostic procedures: low  Management options: low    Patient Progress  Patient progress: stable      Disposition  Final diagnoses:   Right otitis externa   Otalgia of right ear     Time reflects when diagnosis was documented in both MDM as applicable and the Disposition within this note     Time User Action Codes Description Comment    5/31/2022 10:48 AM Evert Saner Add [H60 91] Right otitis externa     5/31/2022 10:49 AM Evert Saner Add [H92 01] Otalgia of right ear       ED Disposition     ED Disposition   Discharge    Condition   Stable    Date/Time   Tue May 31, 2022 Gloria [id number] discharge to home/self care  Follow-up Information     Follow up With Specialties Details Why Contact Info Additional Information    Nohemi Wong DO Internal Medicine   [address] [facility]  2nd Floor, [address] Throat Otolaryngology   [address] [address] [address]  [address], [address] [facility] [address] [facility], [address]    [facility] Emergency Department Emergency Medicine  If symptoms worsen [address] [facility] [address] [facility] Emergency Department, [address], [place], [address]          Patient's Medications   Discharge Prescriptions    AMOXICILLIN (AMOXIL) 500 MG CAPSULE    Take 1 capsule (500 mg total) by mouth every 12 (twelve) hours for 7 days       Start Date: 5/31/2022 End Date: 6/7/2022       Order Dose: 500 mg       Quantity: 14 capsule Refills: 0    OFLOXACIN (FLOXIN) 0 3 % OTIC SOLUTION    Administer 10 drops to the right ear daily       Start Date: 5/31/2022 End Date: --       Order Dose: 10 drops       Quantity: 5 mL    Refills: 0       No discharge procedures on file      PDMP Review       Value Time User    PDMP Reviewed  Yes 8/2/2021  2:34 AM Sravan Sorto MD          ED Provider  Electronically Signed by           Viktor Flores PA-C  06/01/22 0897

## 2022-05-31 NOTE — DISCHARGE INSTRUCTIONS
Please use ear drops as directed  Recommend debrox drops to the ears as well  If right ear pain persists 2-3 days after beginning ear drops may begin oral antibiotics to cover for an inner ear infection  Return immediately to the emergency department if you experience any new or worsening symptoms

## 2022-06-06 ENCOUNTER — SOCIAL WORK (OUTPATIENT)
Dept: BEHAVIORAL/MENTAL HEALTH CLINIC | Facility: CLINIC | Age: 37
End: 2022-06-06
Payer: MEDICARE

## 2022-06-06 DIAGNOSIS — F33.0 MILD EPISODE OF RECURRENT MAJOR DEPRESSIVE DISORDER (HCC): Primary | ICD-10-CM

## 2022-06-06 DIAGNOSIS — F41.1 GENERALIZED ANXIETY DISORDER: ICD-10-CM

## 2022-06-06 DIAGNOSIS — G47.00 INSOMNIA, UNSPECIFIED TYPE: ICD-10-CM

## 2022-06-06 PROCEDURE — 90834 PSYTX W PT 45 MINUTES: CPT | Performed by: SOCIAL WORKER

## 2022-06-06 NOTE — PSYCH
Psychotherapy Provided: Individual Psychotherapy 45 minutes     Length of time in session: 45 minutes, follow up in 2 week    MDDR, Mild, ELIAS,Insomnia    Goals addressed in session: Goal 1, Goal 2 and Goal 3      Pain:      moderate to severe    2    Current suicide risk : Low     Data: Paulo shared his feelings about his parents  They say they do things for him but is is always for their needs and at their convenience  He was crying to day stating they shuffle him around as a burden  He shared he has no one and cant get away from the house  He knows they did the best but he resents being a burden  Regarding his goals his depression and anxiety are increasing  He denies suicidal/homicidal ideation, plan or intent  However he is very conflicted in that he in his mind knows he is a burden to his parents and he resents it  He never called [facility] back because in his words they put him on the back burner so he is putting them on the back burner  He is on the list at \[facility]\""  Assessment: Hang Interiano denies suicidal/homicidal ideation, plan or intent  However his depression and anxiety are increasing  He is dealing with his parents and his multiple health issues  He does have a friend coming down this weekend  Paulo's parents in his words are very judgemental  I provided mindfulness and CBT strategies  Plan: Continue to skill build in distress tolerance  Behavioral Health Treatment Plan [facility]: Diagnosis and Treatment Plan explained to Chase Cunningham relates understanding diagnosis and is agreeable to Treatment Plan   Yes

## 2022-06-06 NOTE — BH TREATMENT PLAN
Karmen Esquivelmore  1985       Date of Initial Treatment Plan: 11/30/2021  Date of Current Treatment Plan: 05/19/2021    Treatment Plan Number 3rd update     Strengths/Personal Resources for Self Care: tries to be positive, considering his health issues he tries to cope    Diagnosis:   1  Mild episode of recurrent major depressive disorder ([address] )     2  Generalized anxiety disorder     3  Insomnia, unspecified type         Area of Needs: Please see below      Long Term Goal 1: I still need to effectively manage my depression and my anxiety    Target Date: 11/14/2022  Completion Date: TBD         Short Term Objectives for Goal 1: mindfulness, CBT and solution focused therapy    Long Term Goal 2: He continues to deal with difficult parents    Target Date: 11/14/2022  Completion Date: TBD    Short Term Objectives for Goal 2: we discussed him setting some boundaries and communicating his feelings  Long Term Goal # 3: He continues to cope with multiple health issues  Target Date: 11/14/2022  Completion Date: TBD    Short Term Objectives for Goal 3: mindfulness and CBT  GOAL 1: Modality: Individual 2x per month   Completion Date tbd    GOAL 2: Modality: Individual 2x per month   Completion Date tbd     GOAL 3: Modality: Individual 2x per month   Completion Date tbd      Behavioral Health Treatment Plan [facility]: Diagnosis and Treatment Plan explained to Milo Flanagan relates understanding diagnosis and is agreeable to Treatment Plan  Client Comments : Please share your thoughts, feelings, need and/or experiences regarding your treatment plan: The therapist and I will work as a team to help me progress on my goals

## 2022-06-16 ENCOUNTER — TELEPHONE (OUTPATIENT)
Dept: PSYCHIATRY | Facility: CLINIC | Age: 37
End: 2022-06-16

## 2022-06-16 NOTE — TELEPHONE ENCOUNTER
Pt called and stated that he wont be able to make the appt today, he stated that his whole household has covid

## 2022-06-23 ENCOUNTER — TELEPHONE (OUTPATIENT)
Dept: PSYCHIATRY | Facility: CLINIC | Age: 37
End: 2022-06-23

## 2022-06-23 NOTE — TELEPHONE ENCOUNTER
Case Management received [facility] obtaining [facility]  Writer outreached to Vielka Mason to provider further information  Patient will need to reach out to his medical doctors to obtain a letter stating Vielka Mason has a documented disability (Diabetes, Kidney Issues, Strokes) and that Vielka Mason would benefit from the services provided by a Service Dog  Once letter is obtained he may contact an organization that trains service animals  Will email resources to patient at [email or web address]  Message was left

## 2022-06-30 ENCOUNTER — TELEPHONE (OUTPATIENT)
Dept: PSYCHIATRY | Facility: CLINIC | Age: 37
End: 2022-06-30

## 2022-06-30 NOTE — TELEPHONE ENCOUNTER
Pt cx appt on 7/1/22 with Lidia Christensen due to has to go in to dialysis appt early  Follow-up 7/15/22   Thank you

## 2022-07-05 NOTE — PROGRESS NOTES
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  Due to concerns for for demyelinating disorder patient underwent a course of plasmapheresis urgency -discharged on 02/05/2018  1  Acute kidney injury:  Resolving, nonoliguric  Felt to be likely related to accelerated hypertension, stigmata of recent LLUVIA  · May be within baseline  Need to continue to trend at steady state  2  Chronic kidney disease stage 4:  Baseline creatinine likely near 3 3-3 5  CKD likely due to diabetic nephropathy  · Outpatient follow-up  3  Nephrotic range proteinuria:  Microalbumin creatinine ratio on 01/23/2018 9281 mg/g  Suspected diabetic nephropathy -previous serologic workup negative  4  Hypertensive urgency:  Currently on labetalol 100 mg t i d , hydralazine 100 mg every 8 hours, nifedipine 60 mg twice a day  Blood pressure improving-but still remains above goal   Required 1 dose of IV labetalol during the night  · Renal artery Doppler evaluation-no evidence of renovascular disease  · Renin, aldosterone, metanephrine levels pending  · TSH normal  · Wean nicardipine as tolerated  · Coreg discontinued -Switched to labetalol on 02/12/2018  Increase labetalol to 200 mg q 8 hours  · NO ACEi or ARB due to renal insufficiency/hyperkalemia  5  Hyperkalemia:  Resolved  Potassium remains high normal currently 5 2 mmol/L (7 4 on admission)  Secondary to renal failure, dietary discretion, type 4 RTA  6   Anemia:  Recent Patient states already spoke with office RN and was triaged. No further questions/concerns.    Reason for Disposition  • Patient already left for the hospital/clinic.    Protocols used: NO CONTACT OR DUPLICATE CONTACT CALL-A-AH       iron studies acceptable  Continue to monitor  · Transfuse for hemoglobin less than 7     · No HETAL due to recent CVA  · Hemoglobin low but stable   7  Recent CVA left pontine, history of lacunar CVA  8  Hyperhomocystinemia:  Hematology follow-up  9  CKD MBD:  Phosphorus mildly elevated 4 8 on 02/12/2018  On renal diet  Continue to monitor  May need to start binder      SUMMARY OF RECOMMENDATIONS:  · Increase labetalol to 200 mg every 8 hours  · Continue renal diet  · Check labs in the a m  SUBJECTIVE / INTERVAL HISTORY:  No complaints  Spoke with patient and his father regarding results of testing and plan of care  OBJECTIVE:  Current Weight: Weight - Scale: 105 kg (231 lb 7 7 oz)  Vitals:    02/13/18 0447 02/13/18 0453 02/13/18 0543 02/13/18 0557   BP: (!) 197/90 (!) 176/84  165/81   BP Location:       Pulse: 88 92  90   Resp:       Temp:       TempSrc:       SpO2:       Weight:   105 kg (231 lb 7 7 oz)    Height:           Intake/Output Summary (Last 24 hours) at 02/13/18 0749  Last data filed at 02/13/18 0400   Gross per 24 hour   Intake           353 33 ml   Output             2895 ml   Net         -2541 67 ml     Physical Exam   Constitutional: He is oriented to person, place, and time  He appears well-developed  HENT:   Head: Normocephalic  Neck: No JVD present  Cardiovascular: Normal rate, regular rhythm and normal heart sounds  Exam reveals no gallop and no friction rub  No murmur heard  Pulmonary/Chest: Effort normal and breath sounds normal  He has no wheezes  He has no rales  Abdominal: Soft  Bowel sounds are normal  He exhibits no distension  There is no tenderness  Musculoskeletal: Normal range of motion  Neurological: He is alert and oriented to person, place, and time  Skin: Skin is warm and dry  Psychiatric: He has a normal mood and affect         Medications:    Current Facility-Administered Medications:     aspirin chewable tablet 162 mg, 162 mg, Oral, Daily, Sindi Vergara PA-C, 162 mg at 02/12/18 0815    atorvastatin (LIPITOR) tablet 40 mg, 40 mg, Oral, QPM, Sindi Vergara PA-C, 40 mg at 02/12/18 1724    clopidogrel (PLAVIX) tablet 75 mg, 75 mg, Oral, Daily, Sindi Vergara PA-C, 75 mg at 02/12/18 0815    [START ON 2/16/2018] ergocalciferol (VITAMIN D2) capsule 50,000 Units, 50,000 Units, Oral, Weekly, Erika Green PA-C    escitalopram (LEXAPRO) tablet 10 mg, 10 mg, Oral, Daily, Sindi Vergara PA-C, 10 mg at 72/92/30 8121    folic acid (FOLVITE) tablet 1 mg, 1 mg, Oral, Daily, Sindi Vergara PA-C, 1 mg at 02/12/18 0815    heparin (porcine) subcutaneous injection 5,000 Units, 5,000 Units, Subcutaneous, Q8H [address], 5,000 Units at 02/13/18 0512 **AND** Platelet count, , , Once, Sindi Vergara PA-C    hydrALAZINE (APRESOLINE) injection 10 mg, 10 mg, Intravenous, Q6H PRN, [employer], SAV, 10 mg at 02/13/18 0447    hydrALAZINE (APRESOLINE) tablet 100 mg, 100 mg, Oral, Q8H [address], Anju Nixon MD, 100 mg at 02/13/18 0513    insulin glargine (LANTUS) subcutaneous injection 6 Units, 6 Units, Subcutaneous, Daily With Breakfast, Sindi Vergara PA-C, 6 Units at 02/12/18 0816    insulin glargine (LANTUS) subcutaneous injection 6 Units, 6 Units, Subcutaneous, HS, Sindi Vergara PA-C, 6 Units at 02/12/18 2128    insulin lispro (HumaLOG) 100 units/mL subcutaneous injection 1-5 Units, 1-5 Units, Subcutaneous, HS, Sindi Vergara PA-C, 2 Units at 02/12/18 2130    insulin lispro (HumaLOG) 100 units/mL subcutaneous injection 1-6 Units, 1-6 Units, Subcutaneous, TID AC, 1 Units at 02/12/18 1724 **AND** Fingerstick Glucose (POCT), , , TID AC, Sindi Vergara PA-C    insulin lispro (HumaLOG) 100 units/mL subcutaneous injection 4 Units, 4 Units, Subcutaneous, Daily With Breakfast, Sindi Vergara PA-C, 4 Units at 02/12/18 0819    insulin lispro (HumaLOG) 100 units/mL subcutaneous injection 4 Units, 4 Units, Subcutaneous, Daily With Lunch, Sindi Vergara PA-C, 4 Units at 02/12/18 1158    insulin lispro (HumaLOG) 100 units/mL subcutaneous injection 4 Units, 4 Units, Subcutaneous, Daily With Dinner, Sindi Vergara PA-C, 4 Units at 02/12/18 1724    labetalol (NORMODYNE) injection 10 mg, 10 mg, Intravenous, Q6H PRN, DEISI Villalpando, 10 mg at 02/13/18 0127    labetalol (NORMODYNE) tablet 100 mg, 100 mg, Oral, Q8H [address], Norris Parks PA-C, 100 mg at 02/13/18 0513    NIFEdipine (PROCARDIA XL) 24 hr tablet 60 mg, 60 mg, Oral, BID (diuretic), Martinez Neff MD, 60 mg at 02/12/18 1723    Laboratory Results:    Results from last 7 days  Lab Units 02/13/18  0450 02/12/18  0453 02/12/18  0005 02/11/18  1616 02/11/18  0841 02/11/18  0254 02/10/18  2208  02/10/18  1744   WBC Thousand/uL  --  9 23  --   --   --  9 49  --   --  10 89*   HEMOGLOBIN g/dL  --  7 9*  --   --   --  7 7*  --   --  8 7*   HEMATOCRIT %  --  23 7*  --   --   --  23 4*  --   --  26 2*   PLATELETS Thousands/uL  --  199  --   --   --  189 185  --  261   SODIUM mmol/L 141 142 142 142 141 141 142  < >  --    POTASSIUM mmol/L 5 1 5 2 5 3 5 4* 5 8* 5 5* 5 8*  < >  --    CHLORIDE mmol/L 110* 111* 111* 110* 112* 111* 109*  < >  --    CO2 mmol/L 21 22 24 21 20* 22 21  < >  --    BUN mg/dL 35* 35* 36* 32* 34* 38* 37*  < >  --    CREATININE mg/dL 3 73* 3 68* 3 84* 3 82* 3 72* 3 90* 3 86*  < >  --    CALCIUM mg/dL 8 5 8 4 8 3 8 6 8 5 8 5 9 0  < >  --    MAGNESIUM mg/dL  --  1 8  --   --   --  1 9 2 0  --   --    PHOSPHORUS mg/dL  --  4 8*  --   --   --  4 4  --   --   --    TOTAL PROTEIN g/dL  --   --   --   --   --  5 4* 5 6*  --   --    GLUCOSE RANDOM mg/dL 185* 91 107 188* 158* 153* 235*  < >  --    < > = values in this interval not displayed    Previous work up:

## 2022-07-06 ENCOUNTER — HOSPITAL ENCOUNTER (OUTPATIENT)
Dept: SLEEP CENTER | Facility: CLINIC | Age: 37
Discharge: HOME | End: 2022-07-06
Payer: MEDICARE

## 2022-07-06 DIAGNOSIS — G47.33 OSA (OBSTRUCTIVE SLEEP APNEA): ICD-10-CM

## 2022-07-06 PROCEDURE — 95810 POLYSOM 6/> YRS 4/> PARAM: CPT | Performed by: INTERNAL MEDICINE

## 2022-07-06 PROCEDURE — 95810 POLYSOM 6/> YRS 4/> PARAM: CPT

## 2022-07-07 DIAGNOSIS — G47.33 OSA (OBSTRUCTIVE SLEEP APNEA): Primary | ICD-10-CM

## 2022-07-07 NOTE — PROGRESS NOTES
Sleep Study Documentation    Pre-Sleep Study       Sleep testing procedure explained to patient:YES    Patient napped prior to study:NO    Caffeine:Dayshift worker after 12PM   NO    Alcohol:Dayshift workers after 5PM: Alcohol use:NO    Typical day for patient: NO       Study Documentation    Sleep Study Indications: Snoring, EDS    Sleep Study: Diagnostic   Snore:Mild  Supplemental O2: no    O2 flow rate (L/min) range   O2 flow rate (L/min) final   Minimum SaO2 85%  Baseline SaO2 96%        Mode of Therapy:    EKG abnormalities: no     EEG abnormalities: no    Sleep Study Recorded < 2 hours: N/A    Sleep Study Recorded > 2 hours but incomplete study: N/A    Sleep Study Recorded 6 hours but no sleep obtained: NO    Patient classification:       Post-Sleep Study    Medication used at bedtime or during sleep study:YES other prescription medications    Patient reports time it took to fall asleep:less than 20 minutes    Patient reports waking up during study:1 to 2 times  Patient reports returning to sleep without difficulty  Patient reports sleeping 6 to 8 hours without dreaming  Patient reports sleep during study:better than usual    Patient rated sleepiness: Somewhat sleepy or tired    PAP treatment:no

## 2022-07-08 ENCOUNTER — TELEPHONE (OUTPATIENT)
Dept: SLEEP CENTER | Facility: CLINIC | Age: 37
End: 2022-07-08

## 2022-07-08 NOTE — TELEPHONE ENCOUNTER
Sleep study shows severe RACHEAL   Dr Nay Peterson ordered APAP     Patient needs DME set up and compliance appointments     Left call back message

## 2022-07-12 ENCOUNTER — OFFICE VISIT (OUTPATIENT)
Dept: PSYCHIATRY | Facility: CLINIC | Age: 37
End: 2022-07-12
Payer: MEDICARE

## 2022-07-12 DIAGNOSIS — F51.04 PSYCHOPHYSIOLOGICAL INSOMNIA: ICD-10-CM

## 2022-07-12 DIAGNOSIS — F41.1 GENERALIZED ANXIETY DISORDER: ICD-10-CM

## 2022-07-12 DIAGNOSIS — F33.1 MODERATE EPISODE OF RECURRENT MAJOR DEPRESSIVE DISORDER (HCC): Primary | ICD-10-CM

## 2022-07-12 DIAGNOSIS — Z79.899 MEDICAL CANNABIS USE: ICD-10-CM

## 2022-07-12 PROCEDURE — 90833 PSYTX W PT W E/M 30 MIN: CPT | Performed by: STUDENT IN AN ORGANIZED HEALTH CARE EDUCATION/TRAINING PROGRAM

## 2022-07-12 PROCEDURE — 99214 OFFICE O/P EST MOD 30 MIN: CPT | Performed by: STUDENT IN AN ORGANIZED HEALTH CARE EDUCATION/TRAINING PROGRAM

## 2022-07-12 RX ORDER — ESCITALOPRAM OXALATE 20 MG/1
20 TABLET ORAL DAILY
Qty: 90 TABLET | Refills: 2 | Status: SHIPPED | OUTPATIENT
Start: 2022-07-12

## 2022-07-12 NOTE — TELEPHONE ENCOUNTER
Returned patient's call, advised sleep study results show sever RACHEAL  Dr Nacho Aparicio ordered APAP  Scheduled DME setup 7/26/22 in [place]  Compliance follow-up with Dr Nacho Aparicio already scheduled for 10/18/22  Rx for CPAP and clinicals sent to [facility] via [place]

## 2022-07-12 NOTE — PSYCH
MEDICATION MANAGEMENT NOTE        [address]      Name and Date of Birth: Arnav Mckeon 43 y o  1985 MRN: [id number]    Date of Visit: July 12, 2022    Reason for Visit: Follow-up visit for medication management     SUBJECTIVE:    Huey Quispe is a 43 y o  male with past psychiatric history significant for major depressive disorder, generalized anxiety, cannabis use (medical) and insomnia who was personally seen and evaluated today at the [address] [place] [address] E outpatient clinic for follow-up and medication management  Ariela Dillon presents as dysphoric yet pleasant and cooperative  His thoughts are organized and linear  He completes assessment without difficulty  Ariela Dillon endorses compliance with psychotropic medication regimen consisting of Lexapro, PRN Hydroxyzine, and PRN Trazodone  He denies adverse side effects  Robinson Eduardo states that he has been taking Trazodone about "3x per month" and finds it to be helpful  Robinson Eduardo endorses a challenging 5+ months since last visit  He reports ongoing familial discord and declining physical health as the culprit  Robinson Eduardo speaks at length about "feeling like a burden" to his parents and family and ways he internalizes guilt related to this  Supportive therapy provided  Robinson Clark also speaks to concerns her has regarding renal transplant and potential future plans to seek out a live donor, something he has avoided  He speaks to ways this may be a "blow to his ego if people don't step up" and CBT was utilized  Acutely, Robinson Godronveland reports ongoing neurovegetative symptomatology and uptick in depression  He reports fair appetite  His energy and motivation are low  He admits to anhedonia at times, denies crying spells  He denies overt SI/HI when asked  He demonstrates self preservation as evidenced by his commitment to his physical and mental health treatment   He does feel a sense of hopelessness regarding his renal transplant and ways he has not "moved up the list"  Again, supportive therapy provided  Surya Randle reports ongoing anxiety and worry  He is more tense today  He denies recent panic symptomatology but states that his frustration tolerance is more limited and thus, he is more argumentative with his parents  During today's examination, Justin Arabella does not exhibit objective evidence of aziza/hypomania or psychosis  Justin Arabella is not currently grandiose, pressured in speech, labile, or pathologically euphoric  Justin Arabella is without perceptual disturbances, such as A/V hallucinations, paranoia, ideas of reference, or delusional beliefs  Justin Arabella denies recent ETOH or illicit substance abuse  He offers no further concerns  Current Rating Scores:     None completed today  Review Of Systems:      Constitutional feeling tired, low energy and as noted in HPI   ENT dizziness   Cardiovascular elevated blood pressure   Respiratory negative   Gastrointestinal negative   Genitourinary negative   Musculoskeletal myalgias and joint pain   Integumentary negative   Neurological confusion   Endocrine negative   Other Symptoms none, all other systems are negative       Past Psychiatric History: (unchanged information from previous note copied and italicized) - Information that is bolded has been updated       Inpatient psychiatric admissions: Denies  Prior outpatient psychiatric linkage: Denies  Past/current psychotherapy: Currently linked with Laci Nesbitt  History of suicidal attempts/gestures: Denies  History of violence/aggressive behaviors: Denies  Psychotropic medication trials: Lexapro (3 years ago), Zoloft, Trazodone (now)  Substance abuse inpatient/outpatient rehabilitation: Denies     Substance Abuse History: (unchanged information from previous note copied and italicized) - Information that is bolded has been updated       No history of ETOH or illict substance abuse   Surya Randle does endorse previous tobacco abuse and current medical cannabis prescription  No past legal actions or arrests secondary to substance intoxication  The patient denies prior DWIs/DUIs  No history of outpatient/inpatient rehabilitation programs  Mateus does not exhibit objective evidence of substance withdrawal during today's examination nor does Mateus appear under the influence of any psychoactive substance           Social History: (unchanged information from previous note copied and italicized) - Information that is bolded has been updated       Developmental: Denies a history of milestone/developmental delay  Denies a history of in-utero exposure to toxins/illicit substances  There is no documented history of IEP or need for special education  He was born and raised in [place], [place]  He lived there for 27 years  He moved to the [facility] in 2011 to live with his sister and work as    His parents have since relocated and he now lives with them    [facility] - [facility] [employer] class of 2006 - degree in theatre   Marital history: single  Living arrangement, social support: parents - has a sister who has children (nephews and nieces)   Occupational History: on permanent disability  Access to firearms: Denies direct access to weapons/firearms  Mateus Mckeon has no history of arrests or violence with a deadly weapon       Traumatic History: (unchanged information from previous note copied and italicized) - Information that is bolded has been updated      Abuse:none is reported  Other Traumatic Events: 2 CVAs - 2015 and 2018      Past Medical History:    Past Medical History:   Diagnosis Date    Acute kidney injury ([facility] )     Ambulates with cane     Anuria     Anxiety     Cellulitis of right elbow 3/31/2021    Chronic kidney disease     Depression     Diabetes mellitus ([address] )     Diarrhea     Emesis 10/24/2020    End stage renal disease ([facility] ) 2/11/2018    Formatting of this note might be different from the original  Last Assessment & Plan: Secondary to DM  On nightly PD  Followed by [facility]    Patient considering transplant for kidney and pancreas through [address] of this note might be different from the original  Last Assessment & Plan:  Formatting of this note might be different from the original  Lab Results  Component Value Date   EGFR     Falls     Gastroparesis     GERD (gastroesophageal reflux disease)     History of shingles 2010    History of transfusion 02/2018    no adverse reaction    Hyperlipidemia     Hyperphosphatemia     Hypertension     Itching     Muscle weakness     general unsteadiness    Obesity (BMI 30 0-34 9) 9/9/2019    PONV (postoperative nausea and vomiting) 1/26/2018    Protein-calorie malnutrition ([address] ) 11/23/2020    Recurrent peritonitis ([address] ) due to peritoneal dialysis catheter 7/31/2020    Retinopathy     Seizures ([address] )     early 2020 - one time    Skin abnormality     some dime size areas where skin was scratched from itching    Spontaneous bacterial peritonitis ([address] ) 10/19/2020    Stroke ([facility] )     x2 - off balance/no driving/fatigue    Swelling of both lower extremities     Vomiting     Wears glasses         Past Surgical History:   Procedure Laterality Date    CARDIAC LOOP RECORDER  05/2018    COLONOSCOPY      EGD      EYE SURGERY Right     IR AV FISTULAGRAM/GRAFTOGRAM  2/23/2021    IR TUNNELED CENTRAL LINE PLACEMENT  2/16/2021    IR TUNNELED DIALYSIS CATHETER PLACEMENT  11/18/2020    IR TUNNELED DIALYSIS CATHETER REMOVAL  2/12/2021    IR TUNNELED DIALYSIS CATHETER REMOVAL  3/11/2021    PERITONEAL CATHETER INSERTION N/A 8/27/2018    Procedure: UNROOF PD CATHETER;  Surgeon: Danielle Justin DO;  [facility];  Service: General    GA ANASTOMOSIS,AV,ANY SITE Left 11/9/2020    Procedure: CREATION FISTULA  ARTERIOVENOUS (AV) - LEFT WRIST;  Surgeon: Darnell Torres MD;  [facility];  Service: Vascular    GA ESOPHAGOGASTRODUODENOSCOPY TRANSORAL DIAGNOSTIC N/A 4/18/2019 Procedure: ESOPHAGOGASTRODUODENOSCOPY (EGD); Surgeon: Elisha Santos MD;  Location: [facility];   Service: Gastroenterology    OH LAP INSERTION TUNNELED INTRAPERITONEAL CATHETER N/A 2018    Procedure: LAPAROSCOPIC PD CATHETER PLACEMENT;  Surgeon: Kaycee Hendrix DO;  [facility];  Service: General    OH REMOVAL TUNNELED INTRAPERITONEAL CATHETER N/A 2020    Procedure: REMOVAL CATHETER PERITONEAL DIALYSIS;  Surgeon: Attila Tuttle MD;  [facility];  Service: General    TONSILLECTOMY      UPPER GASTROINTESTINAL ENDOSCOPY       Allergies   Allergen Reactions    Sulfa Antibiotics Rash       Substance Abuse History:    Social History     Substance and Sexual Activity   Alcohol Use Yes    Comment: occassionally     Social History     Substance and Sexual Activity   Drug Use Yes    Types: Marijuana    Comment: medical marijuana       Social History:    Social History     Socioeconomic History    Marital status: Single     Spouse name: Not on file    Number of children: Not on file    Years of education: Not on file    Highest education level: Not on file   Occupational History    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   Tobacco Use    Smoking status: Former Smoker     Packs/day: 0 50     Years: 12 00     Pack years: 6 00     Types: Cigarettes     Quit date: 2018     Years since quittin 4    Smokeless tobacco: Never Used    Tobacco comment: quit 2018   Vaping Use    Vaping Use: Every day    Substances: THC, CBD   Substance and Sexual Activity    Alcohol use: Yes     Comment: occassionally    Drug use: Yes     Types: Marijuana     Comment: medical marijuana    Sexual activity: Not on file   Other Topics Concern    Not on file   Social History Narrative    Caffeine use    single     Social Determinants of Health     Financial Resource Strain: Not on file   Food Insecurity: Not on file   Transportation Needs: Not on file   Physical Activity: Not on file Stress: Not on file   Social Connections: Not on file   Intimate Partner Violence: Not on file   Housing Stability: Not on file       Family Psychiatric History:     Family History   Problem Relation Age of Onset    Breast cancer Mother     Hypertension Mother     Hyperlipidemia Father     Hypertension Father     Leukemia Maternal Grandmother     Hyperlipidemia Maternal Grandfather     Hypertension Maternal Grandfather     Hyperlipidemia Paternal Grandmother     Hypertension Paternal Grandmother     Heart disease Paternal Grandfather         cardiac disorder    Diabetes Paternal Grandfather        History Review: The following portions of the patient's history were reviewed and updated as appropriate: allergies, current medications, past family history, past medical history, past social history, past surgical history and problem list          OBJECTIVE:     Vital signs in last 24 hours: There were no vitals filed for this visit      Mental Status Evaluation:    Appearance age appropriate, casually dressed, dressed appropriately, looks older than stated age, wearing a hat   Behavior pleasant, cooperative, appears anxious, good eye contact   Speech normal rate, normal volume, normal pitch   Mood depressed   Affect constricted   Thought Processes organized, logical, goal directed   Associations intact associations   Thought Content no overt delusions   Perceptual Disturbances: no auditory hallucinations, no visual hallucinations   Abnormal Thoughts  Risk Potential Suicidal ideation - None at present  Homicidal ideation - None at present  Potential for aggression - No   Orientation oriented to person, place, time/date and situation   Memory recent and remote memory grossly intact   Consciousness alert and awake   Attention Span Concentration Span attention span and concentration are age appropriate   Intellect appears to be of average intelligence   Insight intact and good   Judgement intact and good Muscle Strength and  Gait unstable gait, uses cane   Motor activity no abnormal movements   Language no difficulty naming common objects   Fund of Knowledge adequate knowledge of current events   Pain mild   Pain Scale did not ask to rate       Laboratory Results: I have personally reviewed all pertinent laboratory/tests results    Recent Labs (last 4 months):   Appointment on 05/12/2022   Component Date Value    TSH 3RD GENERATON 05/12/2022 4 172     Ferritin 05/12/2022 875 (A)    THYROID MICROSOMAL ANTIB* 05/12/2022 <8     Thyroglobulin Ab 05/12/2022 <1 0        Suicide/Homicide Risk Assessment:    The following interventions are recommended: no intervention changes needed      Lethality Statement:    Based on today's assessment and clinical criteria, Radha Red contracts for safety and is not an imminent risk of harm to self or others  Outpatient level of care is deemed appropriate at this current time  Codey Talleyfani understands that if they can no longer contract for safety, they need to call the office or report to their nearest Emergency Room for immediate evaluation  Assessment/Plan:     Joe Natarajan a 43 y  o  male with past psychiatric history significant for major depressive disorder, generalized anxiety, cannabis use (medical) and insomnia who was personally seen and evaluated today at the [address] [place] [address] [address] outpatient clinic for follow-up and medication management  Bill endorses an extensive medical history complicated by 2 cerebral vascular accidents in 2015 and 2018  hCa Abebe states that the etiology of these CVAs is unknown but suspected to be secondary to unmanaged diabetes mellitus, hypertension, and history of nicotine use  Cha Abebe states that his second CVA in 2018 was far more debilitating than the first and result in significant impairment in occupational and social functionality  He now ambulates slowly with a walking cane and is on disability   He is no longer independent and requires transportation assistance via family  He also had to move back in with his parents which has been distressing  Over the last few years, Sandeep Bravo states that he was [facility] renal transplant list and was awaiting a harvested kidney  In October 2020, in the context of familial discord and recent verbal disagreement with mother, Sandeep Bravo voiced vague and fleeting thoughts of self-harm to his dialysis nurse  She was perturbed by these statements and referred him to his PCP  While being evaluated by his PCP, Sandeep Bravo was then sent to the ED where he was ultimately given psychiatric resources in the community and discharged home  Unfortunately, as a result of this incident, Sandeep Bravo was removed from the transplant list for "1 year"  This is particularly disconcerting as Sandeep Bravo did not actually harm himself or engage in any self-destructive behavior  He was transparent with his treatment team and emotional raw after a disagreement with his mother  By undergoing dialysis at the exact moment he voiced these concerns to his nurse, he was seeking treatment and thus, acting in a self-preserving manner, suggestive of a will to live  Sandeep Bravo is frustrated regarding being removed from the renal transplant process and is seeking transplant via [facility]  His frustration regarding this process again, is representative of future-orientation and a will to live        Historically, Mateus denies most neurovegetative symptomatology suggestive of major depressive disorder or dysthymia  Mateus does admit to fragmented or non-restorative sleep that ultimately contributes to anergia and amotivation  He has lost his sense of connectivity and purpose as a result of his CVAs and physical limitations, however, he is engaging in therapy and now medication management to regain these  Mateus adamantly denies acute thoughts of suicide or self-harm   Paulo denies acute anxiety that is overwhelming but does repot historical symptomatology suggestive of pathologic/overwhelming anxiety  He does not experience daily or weekly panic attacks  He admits to mild nervousness and fearfulness regarding his health, which is appropriate  He does not feel on-edge, restless, or perpetually irritable  Mateus vehemently denies any acute or chronic history suggestive of an underlying affective (bipolar) organization  rachel denies historical symptomatology suggestive of an underlying psychotic process       Today's Plan:     Psychopharmacologically, Paulo reports uptick in anxiety and depressive symptomatology  As such, will optimize Lexapro to 20mg Edison given tolerability and past benefit with this agent  Will continue PRN Trazodone and Hydroxyzine at current doses  Psychoeducation provided regarding indications for Lexapro, benefits (including delayed maximal benefits up to 4-6 weeks after initiation/dose changes), risks (including the rare but serious risk of suicidal ideation), side effects, and alternative options provided to Justin Bell, and the importance of the compliance with psychiatric treatment reiterated  Justin Arabella verbalized understanding and agreed to the proposed regimen   Justin Bell consented for safety and agreed to call 911/hotline or to go to the nearest ED in case of crisis or safety concerns          DSM-V Diagnoses:      1 ) Major depressive disorder (moderate, recurrent)  2 ) Generalized Anxiety Disorder  3 ) Medical Cannabis Use  4 ) Insomnia        Treatment Recommendations/Precautions:        1 ) Major depressive disorder (moderate, recurrent)  - Optimize Lexapro 10mg Daily to 20mg Daily  - Continue engagement in individual psychotherapy with Laci Nesbitt  - Psychoeducation provided regarding the importance of exercise and healthy dietary choices and their impact on mood, energy, and motivation  - Counseled to avoid ETOH, illict substances, and nicotine secondary to the detrimental effects of these substances on mental and physical health  - Encouraged to engage in non-verbal forms of therapy such as art therapy, music therapy, and mindfulness  - Discussed the bio-psycho-social model to treatment and therapeutic exercises/interventions were attempted to cognitively restructure thoughts     2  ) Generalized Anxiety Disorder  - Optimize Lexapro 10mg Daily to 20mg Daily  - Continue PRN Hydroxyzine 10mg         3  ) Medical Cannabis Use  - Counseled to avoid ETOH, illict substances, and nicotine secondary to the detrimental effects of these substances on mental and physical health  - No concerns for misuse      4 ) Insomnia   - Continue Trazodone 25mg QHS PRN       Medication management every 8 weeks  Continue psychotherapy with [facility] therapist Lokesh Yanes  Aware of need to follow up with family physician for medical issues  Aware of 24 hour and weekend coverage for urgent situations accessed by calling [facility] main practice number    Medications Risks/Benefits      Risks, Benefits And Possible Side Effects Of Medications:    Risks, benefits, and possible side effects of medications explained to Harley Montaño including risk of suicidality and serotonin syndrome related to treatment with antidepressants  He verbalizes understanding and agreement for treatment  Controlled Medication Discussion:     Not applicable - controlled prescriptions are not prescribed by this practice    Psychotherapy Provided:     Individual psychotherapy provided: Yes  Counseling was provided during the session today for 18 minutes  Medication changes discussed with Harley Montaño   Medication education provided to Harley Montaño   Recent stressors discussed with Harley Montaño including family conflict, family issues, health issues, medical problems, limited support, social difficulties, everyday stressors and ongoing anxiety  Coping strategies reviewed with Harley Montaño    Educated on importance of medication and treatment compliance    Importance of follow up with family physician for medical issues reviewed with Jennifer Wolff   Supportive therapy provided  Cognitive therapy was utilized during the session  Treatment Plan:    Completed and signed during the session: Not applicable - Treatment Plan to be completed by [address] [place] [address] therapist    Note Share Disclaimer:      This note was not shared with the patient due to reasonable likelihood of causing patient harm    Tejas Wayne MD 07/12/22

## 2022-07-15 ENCOUNTER — HOSPITAL ENCOUNTER (INPATIENT)
Facility: HOSPITAL | Age: 37
LOS: 1 days | Discharge: HOME | DRG: 638 | End: 2022-07-16
Attending: EMERGENCY MEDICINE | Admitting: INTERNAL MEDICINE
Payer: MEDICARE

## 2022-07-15 ENCOUNTER — APPOINTMENT (EMERGENCY)
Dept: RADIOLOGY | Facility: HOSPITAL | Age: 37
DRG: 638 | End: 2022-07-15
Payer: MEDICARE

## 2022-07-15 ENCOUNTER — APPOINTMENT (EMERGENCY)
Dept: CT IMAGING | Facility: HOSPITAL | Age: 37
DRG: 638 | End: 2022-07-15
Payer: MEDICARE

## 2022-07-15 ENCOUNTER — APPOINTMENT (INPATIENT)
Dept: DIALYSIS | Facility: HOSPITAL | Age: 37
DRG: 638 | End: 2022-07-15
Payer: MEDICARE

## 2022-07-15 DIAGNOSIS — R78.81 POSITIVE BLOOD CULTURE: ICD-10-CM

## 2022-07-15 DIAGNOSIS — K21.9 GASTROESOPHAGEAL REFLUX DISEASE WITHOUT ESOPHAGITIS: ICD-10-CM

## 2022-07-15 DIAGNOSIS — T68.XXXA HYPOTHERMIA, INITIAL ENCOUNTER: ICD-10-CM

## 2022-07-15 DIAGNOSIS — E10.10: ICD-10-CM

## 2022-07-15 DIAGNOSIS — F33.0 MILD EPISODE OF RECURRENT MAJOR DEPRESSIVE DISORDER (HCC): ICD-10-CM

## 2022-07-15 DIAGNOSIS — Z99.2 ESRD ON HEMODIALYSIS (HCC): ICD-10-CM

## 2022-07-15 DIAGNOSIS — N18.6 ESRD ON HEMODIALYSIS (HCC): ICD-10-CM

## 2022-07-15 DIAGNOSIS — E16.2 HYPOGLYCEMIA: Primary | ICD-10-CM

## 2022-07-15 DIAGNOSIS — R73.9 HYPERGLYCEMIA: ICD-10-CM

## 2022-07-15 DIAGNOSIS — F41.1 GENERALIZED ANXIETY DISORDER: ICD-10-CM

## 2022-07-15 DIAGNOSIS — H70.001 MASTOIDITIS, ACUTE, RIGHT: ICD-10-CM

## 2022-07-15 PROBLEM — H70.91 MASTOIDITIS OF RIGHT SIDE: Status: ACTIVE | Noted: 2022-07-15

## 2022-07-15 LAB
2HR DELTA HS TROPONIN: -5 NG/L
ALBUMIN SERPL BCP-MCNC: 4.8 G/DL (ref 3.5–5)
ALP SERPL-CCNC: 106 U/L (ref 34–104)
ALT SERPL W P-5'-P-CCNC: 15 U/L (ref 7–52)
ANION GAP SERPL CALCULATED.3IONS-SCNC: 15 MMOL/L (ref 4–13)
AST SERPL W P-5'-P-CCNC: 24 U/L (ref 13–39)
ATRIAL RATE: 56 BPM
BASOPHILS # BLD AUTO: 0.07 THOUSANDS/ΜL (ref 0–0.1)
BASOPHILS NFR BLD AUTO: 1 % (ref 0–1)
BILIRUB SERPL-MCNC: 0.65 MG/DL (ref 0.2–1)
BUN SERPL-MCNC: 43 MG/DL (ref 5–25)
CALCIUM SERPL-MCNC: 8.8 MG/DL (ref 8.4–10.2)
CARDIAC TROPONIN I PNL SERPL HS: 22 NG/L
CARDIAC TROPONIN I PNL SERPL HS: 27 NG/L
CHLORIDE SERPL-SCNC: 98 MMOL/L (ref 96–108)
CO2 SERPL-SCNC: 30 MMOL/L (ref 21–32)
CORTIS SERPL-MCNC: 29.9 UG/DL
CREAT SERPL-MCNC: 10.32 MG/DL (ref 0.6–1.3)
EOSINOPHIL # BLD AUTO: 0.47 THOUSAND/ΜL (ref 0–0.61)
EOSINOPHIL NFR BLD AUTO: 8 % (ref 0–6)
ERYTHROCYTE [DISTWIDTH] IN BLOOD BY AUTOMATED COUNT: 15.5 % (ref 11.6–15.1)
EST. AVERAGE GLUCOSE BLD GHB EST-MCNC: 146 MG/DL
EXT FECAL OCCULT BLOOD SCREEN: NEGATIVE
EXT. CONTROL: NORMAL
FLUAV RNA RESP QL NAA+PROBE: NEGATIVE
FLUBV RNA RESP QL NAA+PROBE: NEGATIVE
GFR SERPL CREATININE-BSD FRML MDRD: 5 ML/MIN/1.73SQ M
GLUCOSE SERPL-MCNC: 146 MG/DL (ref 65–140)
GLUCOSE SERPL-MCNC: 204 MG/DL (ref 65–140)
GLUCOSE SERPL-MCNC: 236 MG/DL (ref 65–140)
GLUCOSE SERPL-MCNC: 379 MG/DL (ref 65–140)
GLUCOSE SERPL-MCNC: 499 MG/DL (ref 65–140)
GLUCOSE SERPL-MCNC: 76 MG/DL (ref 65–140)
GLUCOSE SERPL-MCNC: 87 MG/DL (ref 65–140)
GLUCOSE SERPL-MCNC: >500 MG/DL (ref 65–140)
HBA1C MFR BLD: 6.7 %
HCT VFR BLD AUTO: 37.4 % (ref 36.5–49.3)
HGB BLD-MCNC: 12.8 G/DL (ref 12–17)
IMM GRANULOCYTES # BLD AUTO: 0.03 THOUSAND/UL (ref 0–0.2)
IMM GRANULOCYTES NFR BLD AUTO: 1 % (ref 0–2)
LACTATE SERPL-SCNC: 1 MMOL/L (ref 0.5–2)
LIPASE SERPL-CCNC: 63 U/L (ref 11–82)
LYMPHOCYTES # BLD AUTO: 1.69 THOUSANDS/ΜL (ref 0.6–4.47)
LYMPHOCYTES NFR BLD AUTO: 28 % (ref 14–44)
MCH RBC QN AUTO: 34.2 PG (ref 26.8–34.3)
MCHC RBC AUTO-ENTMCNC: 34.2 G/DL (ref 31.4–37.4)
MCV RBC AUTO: 100 FL (ref 82–98)
MONOCYTES # BLD AUTO: 0.58 THOUSAND/ΜL (ref 0.17–1.22)
MONOCYTES NFR BLD AUTO: 10 % (ref 4–12)
NEUTROPHILS # BLD AUTO: 3.22 THOUSANDS/ΜL (ref 1.85–7.62)
NEUTS SEG NFR BLD AUTO: 52 % (ref 43–75)
NRBC BLD AUTO-RTO: 0 /100 WBCS
P AXIS: 64 DEGREES
PLATELET # BLD AUTO: 217 THOUSANDS/UL (ref 149–390)
PMV BLD AUTO: 10.9 FL (ref 8.9–12.7)
POTASSIUM SERPL-SCNC: 4 MMOL/L (ref 3.5–5.3)
PR INTERVAL: 192 MS
PROCALCITONIN SERPL-MCNC: 0.36 NG/ML
PROT SERPL-MCNC: 7.2 G/DL (ref 6.4–8.4)
QRS AXIS: 55 DEGREES
QRSD INTERVAL: 86 MS
QT INTERVAL: 476 MS
QTC INTERVAL: 459 MS
RBC # BLD AUTO: 3.74 MILLION/UL (ref 3.88–5.62)
RSV RNA RESP QL NAA+PROBE: NEGATIVE
SARS-COV-2 RNA RESP QL NAA+PROBE: NEGATIVE
SODIUM SERPL-SCNC: 143 MMOL/L (ref 135–147)
T WAVE AXIS: -28 DEGREES
TSH SERPL DL<=0.05 MIU/L-ACNC: 4.14 UIU/ML (ref 0.45–4.5)
VENTRICULAR RATE: 56 BPM
WBC # BLD AUTO: 6.06 THOUSAND/UL (ref 4.31–10.16)

## 2022-07-15 PROCEDURE — 74176 CT ABD & PELVIS W/O CONTRAST: CPT

## 2022-07-15 PROCEDURE — 82533 TOTAL CORTISOL: CPT

## 2022-07-15 PROCEDURE — 0241U HB NFCT DS VIR RESP RNA 4 TRGT: CPT

## 2022-07-15 PROCEDURE — 99285 EMERGENCY DEPT VISIT HI MDM: CPT

## 2022-07-15 PROCEDURE — 83036 HEMOGLOBIN GLYCOSYLATED A1C: CPT | Performed by: NURSE PRACTITIONER

## 2022-07-15 PROCEDURE — 84145 PROCALCITONIN (PCT): CPT

## 2022-07-15 PROCEDURE — 87154 CUL TYP ID BLD PTHGN 6+ TRGT: CPT

## 2022-07-15 PROCEDURE — 82948 REAGENT STRIP/BLOOD GLUCOSE: CPT

## 2022-07-15 PROCEDURE — 99285 EMERGENCY DEPT VISIT HI MDM: CPT | Performed by: EMERGENCY MEDICINE

## 2022-07-15 PROCEDURE — 83690 ASSAY OF LIPASE: CPT

## 2022-07-15 PROCEDURE — 99223 1ST HOSP IP/OBS HIGH 75: CPT | Performed by: INTERNAL MEDICINE

## 2022-07-15 PROCEDURE — 70450 CT HEAD/BRAIN W/O DYE: CPT

## 2022-07-15 PROCEDURE — 5A1D70Z PERFORMANCE OF URINARY FILTRATION, INTERMITTENT, LESS THAN 6 HOURS PER DAY: ICD-10-PCS | Performed by: INTERNAL MEDICINE

## 2022-07-15 PROCEDURE — 36415 COLL VENOUS BLD VENIPUNCTURE: CPT

## 2022-07-15 PROCEDURE — 99232 SBSQ HOSP IP/OBS MODERATE 35: CPT | Performed by: INTERNAL MEDICINE

## 2022-07-15 PROCEDURE — 87040 BLOOD CULTURE FOR BACTERIA: CPT

## 2022-07-15 PROCEDURE — 84443 ASSAY THYROID STIM HORMONE: CPT

## 2022-07-15 PROCEDURE — 71045 X-RAY EXAM CHEST 1 VIEW: CPT

## 2022-07-15 PROCEDURE — G1004 CDSM NDSC: HCPCS

## 2022-07-15 PROCEDURE — 93010 ELECTROCARDIOGRAM REPORT: CPT | Performed by: INTERNAL MEDICINE

## 2022-07-15 PROCEDURE — 71250 CT THORAX DX C-: CPT

## 2022-07-15 PROCEDURE — 84484 ASSAY OF TROPONIN QUANT: CPT

## 2022-07-15 PROCEDURE — 80053 COMPREHEN METABOLIC PANEL: CPT

## 2022-07-15 PROCEDURE — 85025 COMPLETE CBC W/AUTO DIFF WBC: CPT

## 2022-07-15 PROCEDURE — 93005 ELECTROCARDIOGRAM TRACING: CPT

## 2022-07-15 PROCEDURE — 83605 ASSAY OF LACTIC ACID: CPT

## 2022-07-15 PROCEDURE — 96365 THER/PROPH/DIAG IV INF INIT: CPT

## 2022-07-15 RX ORDER — INSULIN LISPRO 100 [IU]/ML
1-5 INJECTION, SOLUTION INTRAVENOUS; SUBCUTANEOUS
Status: DISCONTINUED | OUTPATIENT
Start: 2022-07-15 | End: 2022-07-16 | Stop reason: HOSPADM

## 2022-07-15 RX ORDER — LABETALOL 100 MG/1
400 TABLET, FILM COATED ORAL EVERY 8 HOURS SCHEDULED
Status: DISCONTINUED | OUTPATIENT
Start: 2022-07-15 | End: 2022-07-16 | Stop reason: HOSPADM

## 2022-07-15 RX ORDER — HEPARIN SODIUM 5000 [USP'U]/ML
5000 INJECTION, SOLUTION INTRAVENOUS; SUBCUTANEOUS EVERY 8 HOURS SCHEDULED
Status: DISCONTINUED | OUTPATIENT
Start: 2022-07-15 | End: 2022-07-16 | Stop reason: HOSPADM

## 2022-07-15 RX ORDER — MELATONIN
2000 DAILY
Status: DISCONTINUED | OUTPATIENT
Start: 2022-07-15 | End: 2022-07-16 | Stop reason: HOSPADM

## 2022-07-15 RX ORDER — INSULIN LISPRO 100 [IU]/ML
3 INJECTION, SOLUTION INTRAVENOUS; SUBCUTANEOUS
Status: DISCONTINUED | OUTPATIENT
Start: 2022-07-15 | End: 2022-07-16 | Stop reason: HOSPADM

## 2022-07-15 RX ORDER — LEVOTHYROXINE SODIUM 0.03 MG/1
25 TABLET ORAL
Status: DISCONTINUED | OUTPATIENT
Start: 2022-07-15 | End: 2022-07-15

## 2022-07-15 RX ORDER — TRAZODONE HYDROCHLORIDE 50 MG/1
25 TABLET ORAL
Status: DISCONTINUED | OUTPATIENT
Start: 2022-07-15 | End: 2022-07-16 | Stop reason: HOSPADM

## 2022-07-15 RX ORDER — ESCITALOPRAM OXALATE 20 MG/1
20 TABLET ORAL DAILY
Status: DISCONTINUED | OUTPATIENT
Start: 2022-07-15 | End: 2022-07-16 | Stop reason: HOSPADM

## 2022-07-15 RX ORDER — INSULIN GLARGINE 100 [IU]/ML
9 INJECTION, SOLUTION SUBCUTANEOUS
Status: DISCONTINUED | OUTPATIENT
Start: 2022-07-15 | End: 2022-07-15

## 2022-07-15 RX ORDER — ASPIRIN 81 MG/1
81 TABLET ORAL DAILY
Status: DISCONTINUED | OUTPATIENT
Start: 2022-07-15 | End: 2022-07-16 | Stop reason: HOSPADM

## 2022-07-15 RX ORDER — INSULIN LISPRO 100 [IU]/ML
1-6 INJECTION, SOLUTION INTRAVENOUS; SUBCUTANEOUS
Status: DISCONTINUED | OUTPATIENT
Start: 2022-07-15 | End: 2022-07-16 | Stop reason: HOSPADM

## 2022-07-15 RX ORDER — CALCIUM ACETATE 667 MG/1
2001 CAPSULE ORAL
Status: DISCONTINUED | OUTPATIENT
Start: 2022-07-15 | End: 2022-07-16 | Stop reason: HOSPADM

## 2022-07-15 RX ORDER — LISINOPRIL 20 MG/1
40 TABLET ORAL 2 TIMES DAILY
Status: DISCONTINUED | OUTPATIENT
Start: 2022-07-15 | End: 2022-07-16 | Stop reason: HOSPADM

## 2022-07-15 RX ORDER — INSULIN LISPRO 100 [IU]/ML
1-6 INJECTION, SOLUTION INTRAVENOUS; SUBCUTANEOUS
Status: DISCONTINUED | OUTPATIENT
Start: 2022-07-15 | End: 2022-07-15 | Stop reason: SDUPTHER

## 2022-07-15 RX ORDER — FAMOTIDINE 20 MG/1
40 TABLET, FILM COATED ORAL DAILY
Status: DISCONTINUED | OUTPATIENT
Start: 2022-07-15 | End: 2022-07-16 | Stop reason: HOSPADM

## 2022-07-15 RX ORDER — ONDANSETRON 2 MG/ML
1 INJECTION INTRAMUSCULAR; INTRAVENOUS ONCE
Status: COMPLETED | OUTPATIENT
Start: 2022-07-15 | End: 2022-07-15

## 2022-07-15 RX ORDER — GABAPENTIN 100 MG/1
200 CAPSULE ORAL
Status: DISCONTINUED | OUTPATIENT
Start: 2022-07-15 | End: 2022-07-16 | Stop reason: HOSPADM

## 2022-07-15 RX ORDER — ACETAMINOPHEN 325 MG/1
650 TABLET ORAL EVERY 6 HOURS PRN
Status: DISCONTINUED | OUTPATIENT
Start: 2022-07-15 | End: 2022-07-16 | Stop reason: HOSPADM

## 2022-07-15 RX ORDER — TORSEMIDE 100 MG/1
100 TABLET ORAL DAILY
Status: DISCONTINUED | OUTPATIENT
Start: 2022-07-15 | End: 2022-07-16 | Stop reason: HOSPADM

## 2022-07-15 RX ORDER — CLONIDINE 0.3 MG/24H
1 PATCH, EXTENDED RELEASE TRANSDERMAL WEEKLY
Status: DISCONTINUED | OUTPATIENT
Start: 2022-07-16 | End: 2022-07-16 | Stop reason: HOSPADM

## 2022-07-15 RX ORDER — CINACALCET 30 MG/1
120 TABLET, FILM COATED ORAL DAILY
Status: DISCONTINUED | OUTPATIENT
Start: 2022-07-15 | End: 2022-07-16 | Stop reason: HOSPADM

## 2022-07-15 RX ORDER — INSULIN GLARGINE 100 [IU]/ML
10 INJECTION, SOLUTION SUBCUTANEOUS EVERY 24 HOURS
Status: DISCONTINUED | OUTPATIENT
Start: 2022-07-15 | End: 2022-07-15

## 2022-07-15 RX ORDER — CEFTRIAXONE 2 G/50ML
2000 INJECTION, SOLUTION INTRAVENOUS ONCE
Status: COMPLETED | OUTPATIENT
Start: 2022-07-15 | End: 2022-07-15

## 2022-07-15 RX ORDER — NIFEDIPINE 60 MG/1
60 TABLET, EXTENDED RELEASE ORAL
Status: DISCONTINUED | OUTPATIENT
Start: 2022-07-15 | End: 2022-07-16 | Stop reason: HOSPADM

## 2022-07-15 RX ORDER — ATORVASTATIN CALCIUM 40 MG/1
40 TABLET, FILM COATED ORAL EVERY EVENING
Status: DISCONTINUED | OUTPATIENT
Start: 2022-07-15 | End: 2022-07-16 | Stop reason: HOSPADM

## 2022-07-15 RX ORDER — HYDROXYZINE HYDROCHLORIDE 10 MG/1
10 TABLET, FILM COATED ORAL EVERY 6 HOURS PRN
Status: DISCONTINUED | OUTPATIENT
Start: 2022-07-15 | End: 2022-07-16 | Stop reason: HOSPADM

## 2022-07-15 RX ORDER — ONDANSETRON 2 MG/ML
4 INJECTION INTRAMUSCULAR; INTRAVENOUS EVERY 6 HOURS PRN
Status: DISCONTINUED | OUTPATIENT
Start: 2022-07-15 | End: 2022-07-16 | Stop reason: HOSPADM

## 2022-07-15 RX ORDER — INSULIN GLARGINE 100 [IU]/ML
9 INJECTION, SOLUTION SUBCUTANEOUS
Status: DISCONTINUED | OUTPATIENT
Start: 2022-07-15 | End: 2022-07-16 | Stop reason: HOSPADM

## 2022-07-15 RX ADMIN — CALCIUM ACETATE 2001 MG: 667 CAPSULE ORAL at 18:08

## 2022-07-15 RX ADMIN — CEFTRIAXONE 2000 MG: 2 INJECTION, SOLUTION INTRAVENOUS at 07:10

## 2022-07-15 RX ADMIN — LABETALOL HYDROCHLORIDE 400 MG: 100 TABLET, FILM COATED ORAL at 22:12

## 2022-07-15 RX ADMIN — FAMOTIDINE 40 MG: 20 TABLET, FILM COATED ORAL at 22:12

## 2022-07-15 RX ADMIN — VANCOMYCIN HYDROCHLORIDE 2000 MG: 1 INJECTION, POWDER, LYOPHILIZED, FOR SOLUTION INTRAVENOUS at 07:32

## 2022-07-15 RX ADMIN — INSULIN LISPRO 1 UNITS: 100 INJECTION, SOLUTION INTRAVENOUS; SUBCUTANEOUS at 22:20

## 2022-07-15 RX ADMIN — ATORVASTATIN CALCIUM 40 MG: 40 TABLET, FILM COATED ORAL at 18:08

## 2022-07-15 RX ADMIN — ASPIRIN 81 MG: 81 TABLET, COATED ORAL at 12:34

## 2022-07-15 RX ADMIN — B-COMPLEX W/ C & FOLIC ACID TAB 1 TABLET: TAB at 12:34

## 2022-07-15 RX ADMIN — GABAPENTIN 200 MG: 100 CAPSULE ORAL at 22:12

## 2022-07-15 RX ADMIN — CINACALCET 120 MG: 30 TABLET, FILM COATED ORAL at 12:34

## 2022-07-15 RX ADMIN — HEPARIN SODIUM 5000 UNITS: 5000 INJECTION INTRAVENOUS; SUBCUTANEOUS at 22:12

## 2022-07-15 RX ADMIN — LISINOPRIL 40 MG: 20 TABLET ORAL at 22:12

## 2022-07-15 RX ADMIN — INSULIN LISPRO 3 UNITS: 100 INJECTION, SOLUTION INTRAVENOUS; SUBCUTANEOUS at 18:08

## 2022-07-15 RX ADMIN — Medication 2000 UNITS: at 12:34

## 2022-07-15 RX ADMIN — NIFEDIPINE 60 MG: 60 TABLET, FILM COATED, EXTENDED RELEASE ORAL at 22:20

## 2022-07-15 RX ADMIN — INSULIN GLARGINE 9 UNITS: 100 INJECTION, SOLUTION SUBCUTANEOUS at 22:11

## 2022-07-15 RX ADMIN — CALCIUM ACETATE 2001 MG: 667 CAPSULE ORAL at 12:34

## 2022-07-15 RX ADMIN — ESCITALOPRAM OXALATE 20 MG: 20 TABLET ORAL at 22:12

## 2022-07-15 RX ADMIN — TORSEMIDE 100 MG: 100 TABLET ORAL at 12:34

## 2022-07-15 RX ADMIN — LISINOPRIL 40 MG: 20 TABLET ORAL at 12:34

## 2022-07-15 NOTE — Clinical Note
Case was discussed with Anabel Olmos and the patient's admission status was agreed to be Admission Status: inpatient status to the service of Dr Leal Anchors  
n/a

## 2022-07-15 NOTE — CONSULTS
Consultation - ENT   Huey Quispe 43 y o  male MRN: [id number]  Unit/Bed#: S -01 Encounter: [id number]      Assessment/Plan     Assessment:  R partial mastoid effusion  Plan:  -patient is stable w/o concerning features on CT or exam, ENT will discontinue to follow  Please contact if additional questions/concerns arise    History of Present Illness   Physician Requesting Consult: Adeline Guzman MD  Reason for Consult / Principal Problem: R partial mastoid effusion  HPI: Huey Quispe is a 43y o  year old male who presents to ED for hypoglycemia  On imaging, pt was found to have R partial mastoid effusion  Pt has a hx of recurrent ear infections, w/ most recent one affecting his R ear 1 month ago, which resolved w/ amoxicillin  Consults    Review of Systems  Gen: no fatigue, no fevers/chills  ENT: as per HPI  GI: +/- GERD; Allergy/Immun: no allergies/asthma  Neuro: no headache  Heme: no bleeding/bruising concerns  Endocrine: hot/cold intolerance  Pulm: no SOB; no asthma; no cough  CV: no chest pain or palpitations  Derm: no rashes      Historical Information   Past Medical History:   Diagnosis Date    Acute kidney injury ([facility] [address] )     Ambulates with cane     Anuria     Anxiety     Cellulitis of right elbow 3/31/2021    Chronic kidney disease     Depression     Diabetes mellitus (HCC)     Diarrhea     Emesis 10/24/2020    End stage renal disease ([facility] [address] ) 2/11/2018    Formatting of this note might be different from the original  Last Assessment & Plan:  Secondary to DM  On nightly PD  Followed by [facility]    Patient considering transplant for kidney and pancreas through [id number] of this note might be different from the original  Last Assessment & Plan:  Formatting of this note might be different from the original  Lab Results  Component Value Date   EGFR     Falls     Gastroparesis     GERD (gastroesophageal reflux disease)     History of shingles 2010    History of transfusion 02/2018    no adverse reaction    Hyperlipidemia     Hyperphosphatemia     Hypertension     Itching     Muscle weakness     general unsteadiness    Obesity (BMI 30 0-34 9) 9/9/2019    PONV (postoperative nausea and vomiting) 1/26/2018    Protein-calorie malnutrition ([facility] [address] ) 11/23/2020    Recurrent peritonitis ([facility] [address] ) due to peritoneal dialysis catheter 7/31/2020    Retinopathy     Seizures ([facility] [address] )     early 2020 - one time    Skin abnormality     some dime size areas where skin was scratched from itching    Spontaneous bacterial peritonitis ([facility] [address] ) 10/19/2020    Stroke ([facility] [address] )     x2 - off balance/no driving/fatigue    Swelling of both lower extremities     Vomiting     Wears glasses      Past Surgical History:   Procedure Laterality Date    CARDIAC LOOP RECORDER  05/2018    COLONOSCOPY      EGD      EYE SURGERY Right     IR AV FISTULAGRAM/GRAFTOGRAM  2/23/2021    IR TUNNELED CENTRAL LINE PLACEMENT  2/16/2021    IR TUNNELED DIALYSIS CATHETER PLACEMENT  11/18/2020    IR TUNNELED DIALYSIS CATHETER REMOVAL  2/12/2021    IR TUNNELED DIALYSIS CATHETER REMOVAL  3/11/2021    PERITONEAL CATHETER INSERTION N/A 8/27/2018    Procedure: UNROOF PD CATHETER;  Surgeon: Viktoriya Salazar DO;  [facility];  Service: General    SC ANASTOMOSIS,AV,ANY SITE Left 11/9/2020    Procedure: CREATION FISTULA  ARTERIOVENOUS (AV) - LEFT WRIST;  Surgeon: Tristen Ruvalcaba MD;  [facility];  Service: Vascular    SC ESOPHAGOGASTRODUODENOSCOPY TRANSORAL DIAGNOSTIC N/A 4/18/2019    Procedure: ESOPHAGOGASTRODUODENOSCOPY (EGD); Surgeon: Agusto Rosenbaum MD;  [facility]: [facility];   Service: Gastroenterology    SC LAP INSERTION TUNNELED INTRAPERITONEAL CATHETER N/A 8/6/2018    Procedure: LAPAROSCOPIC PD CATHETER PLACEMENT;  Surgeon: Viktoriya Salazar DO;  [facility];  Service: General    SC REMOVAL TUNNELED INTRAPERITONEAL CATHETER N/A 11/18/2020    Procedure: REMOVAL CATHETER PERITONEAL DIALYSIS; Surgeon: Radha Thomas MD;  [facility];  Service: General    TONSILLECTOMY      UPPER GASTROINTESTINAL ENDOSCOPY       Social History   Social History     Substance and Sexual Activity   Alcohol Use Yes    Comment: occassionally     Social History     Substance and Sexual Activity   Drug Use Yes    Types: Marijuana    Comment: medical marijuana     Social History     Tobacco Use   Smoking Status Former Smoker    Packs/day: 0 50    Years: 12 00    Pack years: 6 00    Types: Cigarettes    Quit date: 2018    Years since quittin 4   Smokeless Tobacco Never Used   Tobacco Comment    quit 2018     Family History:   Family History   Problem Relation Age of Onset    Breast cancer Mother     Hypertension Mother     Hyperlipidemia Father     Hypertension Father     Leukemia Maternal Grandmother     Hyperlipidemia Maternal Grandfather     Hypertension Maternal Grandfather     Hyperlipidemia Paternal Grandmother     Hypertension Paternal Grandmother     Heart disease Paternal Grandfather         cardiac disorder    Diabetes Paternal Grandfather        Meds/Allergies   PTA meds:   Prior to Admission Medications   Prescriptions Last Dose Informant Patient Reported? Taking? Admelog SoloStar 100 units/mL injection pen  Self Yes No   Sig: Inject 6 Units under the skin 3 (three) times a day with meals    Patient not taking: No sig reported   B-D ULTRAFINE III SHORT PEN 31G X 8 MM MISC  Self No No   Sig: USE 1 PEN NEEDLE 8 TIMES DAILY   GLUCAGON EMERGENCY 1 MG injection  Self Yes No   Sig: INJECT 1MG SUBCUTANEOUSLY AS NEEDED (AS DIRECTED)   MAY REPEAT DOSE EVERY 20 MINUTES AS NEEDED   NIFEdipine ER (ADALAT CC) 60 MG 24 hr tablet  Self No No   Sig: Take 1 tablet (60 mg total) by mouth 2 (two) times a day   Patiromer Sorbitex Calcium (VELTASSA PO)  Self Yes No   Sig: Take 5 g by mouth once a week   amoxicillin (AMOXIL) 500 mg capsule  Self Yes No   Sig: TAKE 4 CAPSULES BY MOUTH 1 HOUR PRIOR TO PROCEDURE   Patient not taking: No sig reported   aspirin (ECOTRIN LOW STRENGTH) 81 mg EC tablet  Self Yes No   Sig: Take 81 mg by mouth daily   atorvastatin (LIPITOR) 40 mg tablet  Self No No   Sig: Take 1 tablet (40 mg total) by mouth every evening   b complex vitamins capsule  Self Yes No   Sig: Take 1 capsule by mouth daily before lunch    calcium acetate (PHOSLO) capsule  Self Yes No   Sig: TAKE 3 CAPSULES BY MOUTH WITH MEALS   cholecalciferol (VITAMIN D3) 1,000 units tablet  Self Yes No   Sig: Take 2,000 Units by mouth daily     cinacalcet (SENSIPAR) 60 MG tablet  Self Yes No   Sig: Take 120 mg by mouth daily 2 tab daily    cloNIDine (CATAPRES-TTS-3) 0 3 mg/24 hr  Self Yes No   Si patch once a week    dextrose 50 %  Self Yes No   Si mL   Patient not taking: No sig reported   doxazosin (CARDURA) 2 mg tablet  Self No No   Sig: TAKE 2 TABLETS BY MOUTH IN THE MORNING AND 2 IN THE EVENING   doxazosin (CARDURA) 8 MG tablet  Self Yes No   Sig: Take 8 mg by mouth daily at bedtime   Patient not taking: No sig reported   escitalopram (LEXAPRO) 20 mg tablet   No No   Sig: Take 1 tablet (20 mg total) by mouth daily   famotidine (PEPCID) 40 MG tablet  Self No No   Sig: Take 1 tablet (40 mg total) by mouth 2 (two) times a day   Patient taking differently: Take 40 mg by mouth in the morning    gabapentin (NEURONTIN) 100 mg capsule  Self No No   Sig: Take 2 tablets at bedtime   hydrALAZINE (APRESOLINE) 100 MG tablet  Self No No   Sig: Take 0 5 tablets (50 mg total) by mouth 2 (two) times a day   Patient not taking: No sig reported   hydrOXYzine HCL (ATARAX) 10 mg tablet  Self No No   Sig: Take 1 tablet (10 mg total) by mouth every 6 (six) hours as needed for itching   insulin aspart (NovoLOG) 100 units/mL injection  Self Yes No   Sig: Inject 25 Units under the skin Sliding scale   insulin glargine (Basaglar KwikPen) 100 units/mL injection pen  Self No No   Sig: Inject 14 Units under the skin daily at bedtime Patient taking differently: Inject 7 Units under the skin daily at bedtime   labetalol (NORMODYNE) 200 mg tablet  Self No No   Sig: TAKE 2 TABLETS BY MOUTH THREE TIMES DAILY   levothyroxine 25 mcg tablet  Self No No   Sig: Take 1 tablet (25 mcg total) by mouth daily   lisinopril (ZESTRIL) 40 mg tablet  Self Yes No   Sig: Take 80 mg by mouth daily   metolazone (ZAROXOLYN) 10 mg tablet  Self Yes No   Sig: Take 5 mg by mouth daily     minoxidil (LONITEN) 2 5 mg tablet  Self Yes No   Sig: Take 2 5 mg by mouth 2 (two) times a day    ofloxacin (FLOXIN) 0 3 % otic solution   No No   Sig: Administer 10 drops to the right ear daily   ondansetron (ZOFRAN) 4 mg tablet   No No   Sig: Take 1 tablet (4 mg total) by mouth every 8 (eight) hours as needed for nausea or vomiting   torsemide (DEMADEX) 100 mg tablet  Self Yes No   Sig: Take 100 mg by mouth daily     traZODone (DESYREL) 50 mg tablet  Self No No   Sig: Take 1 tablet (50 mg total) by mouth daily at bedtime as needed for sleep   Patient taking differently: Take 25 mg by mouth daily at bedtime as needed for sleep      Facility-Administered Medications: None     No current facility-administered medications on file prior to encounter       Current Outpatient Medications on File Prior to Encounter   Medication Sig Dispense Refill    ondansetron (ZOFRAN) 4 mg tablet Take 1 tablet (4 mg total) by mouth every 8 (eight) hours as needed for nausea or vomiting 90 tablet 0    Admelog SoloStar 100 units/mL injection pen Inject 6 Units under the skin 3 (three) times a day with meals  (Patient not taking: No sig reported)      amoxicillin (AMOXIL) 500 mg capsule TAKE 4 CAPSULES BY MOUTH 1 HOUR PRIOR TO PROCEDURE (Patient not taking: No sig reported)      aspirin (ECOTRIN LOW STRENGTH) 81 mg EC tablet Take 81 mg by mouth daily      atorvastatin (LIPITOR) 40 mg tablet Take 1 tablet (40 mg total) by mouth every evening 90 tablet 1    b complex vitamins capsule Take 1 capsule by mouth daily before lunch       B-D ULTRAFINE III SHORT PEN 31G X 8 MM MISC USE 1 PEN NEEDLE 8 TIMES DAILY 100 each 47    calcium acetate (PHOSLO) capsule TAKE 3 CAPSULES BY MOUTH WITH MEALS      cholecalciferol (VITAMIN D3) 1,000 units tablet Take 2,000 Units by mouth daily        cinacalcet (SENSIPAR) 60 MG tablet Take 120 mg by mouth daily 2 tab daily       cloNIDine (CATAPRES-TTS-3) 0 3 mg/24 hr 1 patch once a week       dextrose 50 % 25 mL (Patient not taking: No sig reported)      doxazosin (CARDURA) 2 mg tablet TAKE 2 TABLETS BY MOUTH IN THE MORNING AND 2 IN THE EVENING 120 tablet 0    doxazosin (CARDURA) 8 MG tablet Take 8 mg by mouth daily at bedtime (Patient not taking: No sig reported)      escitalopram (LEXAPRO) 20 mg tablet Take 1 tablet (20 mg total) by mouth daily 90 tablet 2    famotidine (PEPCID) 40 MG tablet Take 1 tablet (40 mg total) by mouth 2 (two) times a day (Patient taking differently: Take 40 mg by mouth in the morning ) 60 tablet 5    gabapentin (NEURONTIN) 100 mg capsule Take 2 tablets at bedtime 60 capsule 5    GLUCAGON EMERGENCY 1 MG injection INJECT 1MG SUBCUTANEOUSLY AS NEEDED (AS DIRECTED)   MAY REPEAT DOSE EVERY 20 MINUTES AS NEEDED  3    hydrALAZINE (APRESOLINE) 100 MG tablet Take 0 5 tablets (50 mg total) by mouth 2 (two) times a day (Patient not taking: No sig reported) 90 tablet 1    hydrOXYzine HCL (ATARAX) 10 mg tablet Take 1 tablet (10 mg total) by mouth every 6 (six) hours as needed for itching 30 tablet 0    insulin aspart (NovoLOG) 100 units/mL injection Inject 25 Units under the skin Sliding scale      insulin glargine (Basaglar KwikPen) 100 units/mL injection pen Inject 14 Units under the skin daily at bedtime (Patient taking differently: Inject 7 Units under the skin daily at bedtime)      labetalol (NORMODYNE) 200 mg tablet TAKE 2 TABLETS BY MOUTH THREE TIMES DAILY 180 tablet 0    levothyroxine 25 mcg tablet Take 1 tablet (25 mcg total) by mouth daily 30 tablet 1    lisinopril (ZESTRIL) 40 mg tablet Take 80 mg by mouth daily      metolazone (ZAROXOLYN) 10 mg tablet Take 5 mg by mouth daily        minoxidil (LONITEN) 2 5 mg tablet Take 2 5 mg by mouth 2 (two) times a day       NIFEdipine ER (ADALAT CC) 60 MG 24 hr tablet Take 1 tablet (60 mg total) by mouth 2 (two) times a day 180 tablet 0    ofloxacin (FLOXIN) 0 3 % otic solution Administer 10 drops to the right ear daily 5 mL 0    Patiromer Sorbitex Calcium (VELTASSA PO) Take 5 g by mouth once a week      torsemide (DEMADEX) 100 mg tablet Take 100 mg by mouth daily        traZODone (DESYREL) 50 mg tablet Take 1 tablet (50 mg total) by mouth daily at bedtime as needed for sleep (Patient taking differently: Take 25 mg by mouth daily at bedtime as needed for sleep) 30 tablet 1       Allergies   Allergen Reactions    Sulfa Antibiotics Rash         Objective     Vitals:    07/15/22 0745 07/15/22 0800 07/15/22 0815 07/15/22 1129   BP:  154/77  158/86   BP Location:    Right arm   Pulse: 64 64 66 76   Resp:    18   Temp:    98 1 °F (36 7 °C)   TempSrc:    Oral   SpO2: 100% 99% 100% 100%       No intake or output data in the 24 hours ending 07/15/22 1342    Invasive Devices  Report    Peripheral Intravenous Line  Duration           Peripheral IV 07/15/22 Right Antecubital <1 day          Line  Duration           Hemodialysis AV Fistula Left Forearm -- days    Hemodialysis AV Fistula 11/09/20 Left Other (Comment) 613 days                Physical Exam  Gen: NAD, awake/alert, no stridor  Voice: normal  Head: Normocephalic/atraumatic  Face: symmetric, full CN VII functions (House-Brackmann I)  Ears: pinnae unremarkable, no pain to palpation; EAC patent; TM intact/translucent/without OME, no mastoid erythema or tenderness  Nose: no external abnormality; nares patent; septum midline; inferior turbinates pink; clear secretions; no pus; no polyps  Oral cavity: no lesions; tongue soft/mobile  Oropharynx: no lesions; no tonsils; cannot visualize uvula  Neck:soft, nontender, no masses or LAD  CN: II-XII grossly intact  Hearing: grossly intact with finger rub  Salivary glands: parotids/submandibular glands soft, nontender    Lab Results: I have personally reviewed pertinent lab results  Imaging Studies: I have personally reviewed pertinent reports  FINDINGS:    PARENCHYMA:    Stable chronic infarct in the posterior left insular ribbon  No intracranial mass, mass effect or midline shift  No CT signs of acute infarction  No acute parenchymal hemorrhage  VENTRICLES AND EXTRA-AXIAL SPACES: Normal for the patient's age  VISUALIZED ORBITS AND PARANASAL SINUSES: Unremarkable  CALVARIUM AND EXTRACRANIAL SOFT TISSUES: Normal     New right mastoid effusion  IMPRESSION:    1  No acute intracranial CT abnormality  No significant interval change  2  Right mastoid effusion  Correlate for mastoiditis  EKG, Pathology, and Other Studies: I have personally reviewed pertinent reports        Code Status: Level 1 - Full Code

## 2022-07-15 NOTE — H&P
[facility]  H&P- Haylee Proper 1985, 43 y o  male MRN: [id number]  Unit/Bed#: S -01 Encounter: [id number]  Primary Care Provider: Laura Molina DO   Date and time admitted to hospital: 7/15/2022  4:18 AM    * Hypoglycemia  Assessment & Plan  Patient currently takes short-acting insulin 6 units 3 times with meals and 9 units Lantus at bedtime  Yesterday he had is denied as usual and he took 6 units short-acting with meals, 9 units at bedtime and around 1:00 a m  He noticed his blood glucose was 319 and he has taken 6 units of short-acting insulin and his glucose monitor subsequently shows rapid drop in his blood glucose levels resulting in hypoglycemia  Currently patient is asymptomatic, I will hold short-acting insulin, restart long-acting insulin and consult Endocrinology  At this time there is no clear sign of infection, he has received dose of vancomycin and ceftriaxone in the ED, will monitor off antibiotics  Her blood cultures have been obtained, follow-up  Type 1 diabetes mellitus [facility])  Assessment & Plan  Lab Results   Component Value Date    HGBA1C 5 8 (H) 09/03/2021       Recent Labs     07/15/22  0422 07/15/22  0558 07/15/22  0703 07/15/22  0751   POCGLU 87 236* 379* >500*       Blood Sugar Average: Last 72 hrs:  (P) 234     Insulin regimen as mentioned above, consult Endocrinology  Mastoiditis of right side  Assessment & Plan  Suspected mastoiditis however patient has no clear tenderness, postauricular tenderness, discharge, fever, headache  Received vancomycin and ceftriaxone in the emergency department, unlikely acute infection, we will consult ENT for further evaluation and need for antibiotics as patient has diabetes and end-stage renal disease  For now I will monitor him off antibiotics until seen by ENT      Coronary artery disease involving native coronary artery of native heart without angina pectoris  Assessment & Plan  Continue on aspirin, beta-blocker, statin  End stage renal disease on dialysis due to type 1 diabetes mellitus [facility])  Assessment & Plan  Lab Results   Component Value Date    HGBA1C 5 8 (H) 09/03/2021       Recent Labs     07/15/22  0422 07/15/22  0558 07/15/22  0703 07/15/22  0751   POCGLU 87 236* 379* >500*       Blood Sugar Average: Last 72 hrs:  (P) 234     Monday Wednesday Friday, nephrology consulted  For hemodialysis today  Multiple pulmonary nodules  Assessment & Plan  Patient does follow up with PCP    History of lacunar cerebrovascular accident (CVA)  Assessment & Plan  Continue aspirin and statin  VTE Prophylaxis: Heparin    Code Status:  Full code  POLST: There is no POLST form on file for this patient (pre-hospital)  Discussion with family:  Patient's mother at the bedside    Anticipated Length of Stay:  Patient will be admitted on an Inpatient basis with an anticipated length of stay of  more than 2 midnights  Justification for Hospital Stay:  Hypoglycemia, rule out occult infection    Total Time for Visit, including Counseling / Coordination of Care: 70 minutes  Greater than 50% of this total time spent on direct patient counseling and coordination of care  Chief Complaint:   Hypoglycemia    History of Present Illness:    Odell Diego is a 43 y o  male who presents with type 1 diabetes mellitus on insulin, end-stage renal disease presents with complaints of hypoglycemia  Per patient and his mother glucose monitor was going off this morning and mother went to see the patient and he was somewhat confused  There noticed his glucose was reading low at 47, had some companies, oatmeal and his symptoms resolved  Subsequently presented to the emergency department  Patient denies any headache, dizziness, chest pain, shortness of breath  Denies nausea vomiting or abdominal pain but denies any cough, fever, chills  Denies any urinary or bowel complaints    Denies any hematemesis, melena  Glucose shot up to 500 shortly after arrival on to the floor, he has administered 8 units by himself  Review of Systems:    More than 10 system review of systems was performed, pertinent positive and negative findings mentioned as per history of present illness  Past Medical and Surgical History:     Past Medical History:   Diagnosis Date    Acute kidney injury ([facility] [address] )     Ambulates with cane     Anuria     Anxiety     Cellulitis of right elbow 3/31/2021    Chronic kidney disease     Depression     Diabetes mellitus ([facility] [address] )     Diarrhea     Emesis 10/24/2020    End stage renal disease ([facility] [address] ) 2/11/2018    Formatting of this note might be different from the original  Last Assessment & Plan:  Secondary to DM  On nightly PD  Followed by Nephro    Patient considering transplant for kidney and pancreas through [id number] of this note might be different from the original  Last Assessment & Plan:  Formatting of this note might be different from the original  Lab Results  Component Value Date   EGFR     Falls     Gastroparesis     GERD (gastroesophageal reflux disease)     History of shingles 2010    History of transfusion 02/2018    no adverse reaction    Hyperlipidemia     Hyperphosphatemia     Hypertension     Itching     Muscle weakness     general unsteadiness    Obesity (BMI 30 0-34 9) 9/9/2019    PONV (postoperative nausea and vomiting) 1/26/2018    Protein-calorie malnutrition ([address] ) 11/23/2020    Recurrent peritonitis ([facility] [address] [facility] ) due to peritoneal dialysis catheter 7/31/2020    Retinopathy     Seizures ([address] )     early 2020 - one time    Skin abnormality     some dime size areas where skin was scratched from itching    Spontaneous bacterial peritonitis ([address] ) 10/19/2020    Stroke ([facility] [address] )     x2 - off balance/no driving/fatigue    Swelling of both lower extremities     Vomiting     Wears glasses        Past Surgical History:   Procedure Laterality Date    CARDIAC LOOP RECORDER  05/2018    COLONOSCOPY      EGD      EYE SURGERY Right     IR AV FISTULAGRAM/GRAFTOGRAM  2/23/2021    IR TUNNELED CENTRAL LINE PLACEMENT  2/16/2021    IR TUNNELED DIALYSIS CATHETER PLACEMENT  11/18/2020    IR TUNNELED DIALYSIS CATHETER REMOVAL  2/12/2021    IR TUNNELED DIALYSIS CATHETER REMOVAL  3/11/2021    PERITONEAL CATHETER INSERTION N/A 8/27/2018    Procedure: UNROOF PD CATHETER;  Surgeon: Leah Lai DO;  Location: [facility];  Service: General    AK ANASTOMOSIS,AV,ANY SITE Left 11/9/2020    Procedure: CREATION FISTULA  ARTERIOVENOUS (AV) - LEFT WRIST;  Surgeon: Ernestina Ji MD;  [facility];  Service: Vascular    AK ESOPHAGOGASTRODUODENOSCOPY TRANSORAL DIAGNOSTIC N/A 4/18/2019    Procedure: ESOPHAGOGASTRODUODENOSCOPY (EGD); Surgeon: Jono Bee MD;  Location: [facility]; Service: Gastroenterology    AK LAP INSERTION TUNNELED INTRAPERITONEAL CATHETER N/A 8/6/2018    Procedure: LAPAROSCOPIC PD CATHETER PLACEMENT;  Surgeon: Leah Lai DO;  [facility];  Service: General    AK REMOVAL TUNNELED INTRAPERITONEAL CATHETER N/A 11/18/2020    Procedure: REMOVAL CATHETER PERITONEAL DIALYSIS;  Surgeon: Hari Farooq MD;  [facility];  Service: General    TONSILLECTOMY      UPPER GASTROINTESTINAL ENDOSCOPY         Meds/Allergies:    Prior to Admission medications    Medication Sig Start Date End Date Taking?  Authorizing Provider   ondansetron (ZOFRAN) 4 mg tablet Take 1 tablet (4 mg total) by mouth every 8 (eight) hours as needed for nausea or vomiting 5/16/22   Ludwin Cartagena, DO   Admelog SoloStar 100 units/mL injection pen Inject 6 Units under the skin 3 (three) times a day with meals   Patient not taking: No sig reported 2/5/21   Historical Provider, MD   amoxicillin (AMOXIL) 500 mg capsule TAKE 4 CAPSULES BY MOUTH 1 HOUR PRIOR TO PROCEDURE  Patient not taking: No sig reported 9/22/21   Historical Provider, MD   aspirin (ECOTRIN LOW STRENGTH) 81 mg EC tablet Take 81 mg by mouth daily    Historical Provider, MD   atorvastatin (LIPITOR) 40 mg tablet Take 1 tablet (40 mg total) by mouth every evening 3/23/22   Elle Session, DO   b complex vitamins capsule Take 1 capsule by mouth daily before lunch     Historical Provider, MD QUEEN ULTRAFINE III SHORT PEN 31G X 8 MM MISC USE 1 PEN NEEDLE 8 TIMES DAILY 9/24/19   Elle Session, DO   calcium acetate (PHOSLO) capsule TAKE 3 CAPSULES BY MOUTH WITH MEALS 3/12/21   Historical Provider, MD   cholecalciferol (VITAMIN D3) 1,000 units tablet Take 2,000 Units by mouth daily      Historical Provider, MD   cinacalcet (SENSIPAR) 60 MG tablet Take 120 mg by mouth daily 2 tab daily     Historical Provider, MD   cloNIDine (CATAPRES-TTS-3) 0 3 mg/24 hr 1 patch once a week     Historical Provider, MD   dextrose 50 % 25 mL  Patient not taking: No sig reported 4/11/22   Historical Provider, MD   doxazosin (CARDURA) 2 mg tablet TAKE 2 TABLETS BY MOUTH IN THE MORNING AND 2 IN THE EVENING 5/24/21   Paddy Catherine MD   doxazosin (CARDURA) 8 MG tablet Take 8 mg by mouth daily at bedtime  Patient not taking: No sig reported    Historical Provider, MD   escitalopram (LEXAPRO) 20 mg tablet Take 1 tablet (20 mg total) by mouth daily 7/12/22   Kecia Zurita MD   famotidine (PEPCID) 40 MG tablet Take 1 tablet (40 mg total) by mouth 2 (two) times a day  Patient taking differently: Take 40 mg by mouth in the morning  10/26/21   Ninfa Rodriguez PA-C   gabapentin (NEURONTIN) 100 mg capsule Take 2 tablets at bedtime 1/17/22   Dania Sher,    GLUCAGON EMERGENCY 1 MG injection INJECT 1MG SUBCUTANEOUSLY AS NEEDED (AS DIRECTED)   MAY REPEAT DOSE EVERY 20 MINUTES AS NEEDED 7/24/19   Historical Provider, MD   hydrALAZINE (APRESOLINE) 100 MG tablet Take 0 5 tablets (50 mg total) by mouth 2 (two) times a day  Patient not taking: No sig reported 11/19/20   Elle Session, DO   hydrOXYzine HCL (ATARAX) 10 mg tablet Take 1 tablet (10 mg total) by mouth every 6 (six) hours as needed for itching 10/5/21   Dania Steward DO   insulin aspart (NovoLOG) 100 units/mL injection Inject 25 Units under the skin Sliding scale    Historical Provider, MD   insulin glargine (Basaglar KwikPen) 100 units/mL injection pen Inject 14 Units under the skin daily at bedtime  Patient taking differently: Inject 7 Units under the skin daily at bedtime 12/17/20   Elsa Trejo MD   labetalol (NORMODYNE) 200 mg tablet TAKE 2 TABLETS BY MOUTH THREE TIMES DAILY 2/21/22   DEISI Conley   levothyroxine 25 mcg tablet Take 1 tablet (25 mcg total) by mouth daily 2/8/22   Renetta Echevarria DO   lisinopril (ZESTRIL) 40 mg tablet Take 80 mg by mouth daily 2/9/22   Historical Provider, MD   metolazone (ZAROXOLYN) 10 mg tablet Take 5 mg by mouth daily      Historical Provider, MD   minoxidil (LONITEN) 2 5 mg tablet Take 2 5 mg by mouth 2 (two) times a day  2/9/21   Historical Provider, MD   NIFEdipine ER (ADALAT CC) 60 MG 24 hr tablet Take 1 tablet (60 mg total) by mouth 2 (two) times a day 3/3/20   Armida Burns MD   ofloxacin (FLOXIN) 0 3 % otic solution Administer 10 drops to the right ear daily 5/31/22   Chelle Andrews PA-C   Patiromer Sorbitex Calcium (VELTASSA PO) Take 5 g by mouth once a week    Historical Provider, MD   torsemide (DEMADEX) 100 mg tablet Take 100 mg by mouth daily      Historical Provider, MD   traZODone (DESYREL) 50 mg tablet Take 1 tablet (50 mg total) by mouth daily at bedtime as needed for sleep  Patient taking differently: Take 25 mg by mouth daily at bedtime as needed for sleep 11/23/21   Tejas Wayne MD     I have reviewed home medications with patient personally  Allergies:    Allergies   Allergen Reactions    Sulfa Antibiotics Rash       Social History:     Marital Status: Single     Social History     Substance and Sexual Activity   Alcohol Use Yes    Comment: occassionally     Social History     Tobacco Use   Smoking Status Former Smoker    Packs/day: 0 50    Years: 12 00    Pack years: 6 00    Types: Cigarettes    Quit date: 2018    Years since quittin 4   Smokeless Tobacco Never Used   Tobacco Comment    quit 2018     Social History     Substance and Sexual Activity   Drug Use Yes    Types: Marijuana    Comment: medical marijuana       Family History:    non-contributory    Physical Exam:     Vitals:   Blood Pressure: 154/77 (07/15/22 0800)  Pulse: 66 (07/15/22 0815)  Temperature: 97 7 °F (36 5 °C) (extra warm blankets applied) (07/15/22 0728)  Temp Source: Oral (07/15/22 0728)  Respirations: 18 (07/15/22 0615)  SpO2: 100 % (07/15/22 0815)    Physical Exam    General appearance:  Patient not in acute distress  Eyes:  Pupils equal reacting to light  ENT:  Moist oral mucous membranes  CVS:  S1-S2 heard, regular rate and rhythm, no pedal edema  Chest:  Bilateral air entry present, clear to auscultation  Abdomen:  Soft, nontender, bowel sounds present  CNS:  No focal neurological deficits  Genitourinary: deferred  Skin:  No acute rash, multiple scratch marks of  psychiatric:  No psychosis  Musculoskeletal:  No joint deformities, right toe nail was removed and foot does not appear to be infected      Additional Data:     Lab Results: I have personally reviewed pertinent reports        Results from last 7 days   Lab Units 07/15/22  0509   WBC Thousand/uL 6 06   HEMOGLOBIN g/dL 12 8   HEMATOCRIT % 37 4   PLATELETS Thousands/uL 217   NEUTROS PCT % 52   LYMPHS PCT % 28   MONOS PCT % 10   EOS PCT % 8*     Results from last 7 days   Lab Units 07/15/22  0509   SODIUM mmol/L 143   POTASSIUM mmol/L 4 0   CHLORIDE mmol/L 98   CO2 mmol/L 30   BUN mg/dL 43*   CREATININE mg/dL 10 32*   ANION GAP mmol/L 15*   CALCIUM mg/dL 8 8   ALBUMIN g/dL 4 8   TOTAL BILIRUBIN mg/dL 0 65   ALK PHOS U/L 106*   ALT U/L 15   AST U/L 24   GLUCOSE RANDOM mg/dL 76         Results from last 7 days   Lab Units 07/15/22  0751 07/15/22  0703 07/15/22  0558 07/15/22  0422   POC GLUCOSE mg/dl >500* 379* 236* 87         Results from last 7 days   Lab Units 07/15/22  0522   LACTIC ACID mmol/L 1 0   PROCALCITONIN ng/ml 0 36*       Imaging: I have personally reviewed pertinent reports  CT head without contrast   ED Interpretation by Katrina Bruno MD (49/55 [id number])   FINDINGS:     PARENCHYMA:     Stable chronic infarct in the posterior left insular ribbon      No intracranial mass, mass effect or midline shift  No CT signs of acute infarction  No acute parenchymal hemorrhage      VENTRICLES AND EXTRA-AXIAL SPACES:  Normal for the patient's age      VISUALIZED ORBITS AND PARANASAL SINUSES:  Unremarkable      CALVARIUM AND EXTRACRANIAL SOFT TISSUES:  Normal      New right mastoid effusion      IMPRESSION:     1  No acute intracranial CT abnormality  No significant interval change      2  Right mastoid effusion  Correlate for mastoiditis  Final Result by Aubree Sprague MD (07/15 4637)      1  No acute intracranial CT abnormality  No significant interval change  2   Right mastoid effusion  Correlate for mastoiditis  Workstation performed: [id number]         CT chest abdomen pelvis wo contrast   ED Interpretation by Katrina Bruno MD (42/31 3137)   FINDINGS:     CHEST     LUNGS:  Tracheobronchial tree is unremarkable  Bilateral dependent and right basal subsegmental atelectasis      Bilateral subcentimeter pulmonary nodules are stable from chest CT 9/9/2021    More pronounced representatives include     -Left upper lobe 4 mm nodule (series 3 image 52)  - Posterior left upper lobe 3 mm subpleural nodule (series 3 image 21)  -Left lower lobe 7 mm nodule (series 3 image 67)  -Medial left lower lobe 5 mm nodule (series 3 image 80)  -Right upper lobe 4 mm nodule (series 2 image 28)  - Right lower lobe 5 mm nodule (series 3 image 55)  - A 3 mm nodule versus lymph node in the periphery of right middle lobe along the minor fissure (series 3 image 47)        PLEURA:  Unremarkable      HEART/GREAT VESSELS: Normal heart size  Coronary artery atherosclerotic disease  No thoracic aortic aneurysm      MEDIASTINUM AND KATHERINE:  Unremarkable      CHEST WALL AND LOWER NECK:  Unremarkable      ABDOMEN     LIVER/BILIARY TREE:  Unremarkable      GAL   LBLADDER:  Mainly contracted     SPLEEN:  Unremarkable      PANCREAS:  Unremarkable      ADRENAL GLANDS:  Unremarkable      KIDNEYS/URETERS:  Atrophic bilateral kidneys  No hydronephrosis      STOMACH AND BOWEL:  Large amount of hyperattenuating fluid within the stomach and duodenum containing more hyperdense layering fluid  Nondilated fluid filled remaining small bowel  Similar fluid is noted in the proximal ascending colon      APPENDIX:  No findings to suggest appendicitis      ABDOMINOPELVIC CAVITY:  No ascites  No pneumoperitoneum  No lymphadenopathy      VESSELS:  Atherosclerotic changes are present  No evidence of aneurysm      PELVIS     REPRODUCTIVE ORGANS:  Unremarkable for patient's age      URINARY BLADDER:  Partially distended and appear grossly unremarkable      ABDOMINAL WALL/INGUINAL REGIONS:  Unremarkable      OSSEOUS STRUCTURES:  No acute fracture or destructive osseous lesion      IMPRESSION:     1   Large amount of hyperattenuating fluid within the stomach an   d duodenum containing more hyperdense layering fluid  Nondilated fluid filled remaining small bowel  GI bleed is not excluded in appropriate clinical and paraclinical setting      2  No acute finding within the chest      3   Pulmonary nodules measuring up to 7 mm are stable from chest CT 9/9/2021  Based on current Fleischner Society 2017 Guidelines on incidental pulmonary nodule, followup non-contrast CT is recommended at 3-6 months from the initial examination and, if   stable at that time, an additional followup is recommended for 18-24 months from the initial examination    Therefore, next low-dose chest CT follow-up should be 8 months from the current exam       Final Result by Chapito Miranda MD (07/15 4646)      1  Large amount of hyperattenuating fluid within the stomach and duodenum containing more hyperdense layering fluid  Nondilated fluid filled remaining small bowel  GI bleed is not excluded in appropriate clinical and paraclinical setting  2   No acute finding within the chest       3   Pulmonary nodules measuring up to 7 mm are stable from chest CT 9/9/2021  Based on current Fleischner Society 2017 Guidelines on incidental pulmonary nodule, followup non-contrast CT is recommended at 3-6 months from the initial examination and, if    stable at that time, an additional followup is recommended for 18-24 months from the initial examination  Therefore, next low-dose chest CT follow-up should be 8 months from the current exam                    I personally discussed this study with Ed Aranda on 7/15/2022 at 6:35 AM       This study was marked in [facility] for notification and follow-up  Workstation performed: [id number]         XR chest 1 view portable   ED Interpretation by Lanie Stephenson MD (07/15 5962)   No acute disease  Read by me  Final Result by Maritza Barriga MD (18/03 8715)      No acute cardiopulmonary disease  Workstation performed: [id number]             Allscripts / Epic Records Reviewed: Yes     ** Please Note: This note has been constructed using a voice recognition system   **

## 2022-07-15 NOTE — ASSESSMENT & PLAN NOTE
Lab Results   Component Value Date    HGBA1C 5 8 (H) 09/03/2021       Recent Labs     07/15/22  0422 07/15/22  0558 07/15/22  0703 07/15/22  0751   POCGLU 87 236* 379* >500*       Blood Sugar Average: Last 72 hrs:  (P) 234     Insulin regimen as mentioned above, consult Endocrinology

## 2022-07-15 NOTE — ASSESSMENT & PLAN NOTE
Patient currently takes short-acting insulin 6 units 3 times with meals and 9 units Lantus at bedtime  Yesterday he had is denied as usual and he took 6 units short-acting with meals, 9 units at bedtime and around 1:00 a m  He noticed his blood glucose was 319 and he has taken 6 units of short-acting insulin and his glucose monitor subsequently shows rapid drop in his blood glucose levels resulting in hypoglycemia  Currently patient is asymptomatic, I will hold short-acting insulin, restart long-acting insulin and consult Endocrinology  At this time there is no clear sign of infection, he has received dose of vancomycin and ceftriaxone in the ED, will monitor off antibiotics  Her blood cultures have been obtained, follow-up

## 2022-07-15 NOTE — ASSESSMENT & PLAN NOTE
Suspected mastoiditis however patient has no clear tenderness, postauricular tenderness, discharge, fever, headache  Received vancomycin and ceftriaxone in the emergency department, unlikely acute infection, we will consult ENT for further evaluation and need for antibiotics as patient has diabetes and end-stage renal disease  For now I will monitor him off antibiotics until seen by ENT

## 2022-07-15 NOTE — ED PROCEDURE NOTE
PROCEDURE  CriticalCare Time  Performed by: Radha Álvarez MD  Authorized by: Radha Álvarez MD     Critical care provider statement:     Critical care time (minutes):  35    Critical care time was exclusive of:  Separately billable procedures and treating other patients    Critical care was necessary to treat or prevent imminent or life-threatening deterioration of the following conditions:  Endocrine crisis (hypothermia)    Critical care was time spent personally by me on the following activities:  Obtaining history from patient or surrogate, development of treatment plan with patient or surrogate, discussions with consultants, evaluation of patient's response to treatment, examination of patient, ordering and performing treatments and interventions, ordering and review of radiographic studies, ordering and review of laboratory studies, review of old charts and re-evaluation of patient's condition    I assumed direction of critical care for this patient from another provider in my specialty: no           Radha Álvarez MD  07/15/22 0065

## 2022-07-15 NOTE — ED PROVIDER NOTES
History  Chief Complaint   Patient presents with    Hypoglycemia - Symptomatic     Pt arrives per EMS with report of blood glucose reading of 47 while sleeping  Pt given oral glucose by EMS and sugar came up to 71  Pt reports feeling cold and weak  HPI Pedrito Durham is a 39yoM with PMH significant for DMT1 on insulin, ESRD requiring HD (MWF), and h/o stroke who presents to the ED via EMS for hypoglycemia  Patient's parents are at bedside to give additional history  Per their report, they were woken up by the sound of patient's Dexcom alarm, signaling BG <100  Patient's mother went to check on patient and heard him fall out of bed (unwitnessed) but she does not think he hit his head  Mom denies LOC but admits he was at times unresponsive/slow to respond  Initially, she gave patient 3 glucose gummies; shortly after, he also ate some oatmeal, snickers, Cinnamon Toast Crunch cereal     Patient reports he has been feeling in his usual state of health; he ate dinner per usual this evening, and then gave himself his normal dose of long-acting insulin before bed (approximately 10p)  Patient denies any changes to his daily insulin doses; around 1p yesterday afternoon, patient's BG spiked to 309 so he gave himself 6 units of short-acting  He felt nauseous in the EMS truck so they gave him zofran; he otherwise denies n/v  He has felt chilled and has some new left sided abdominal pain   He denies sob or chest pain  He denies any open skin wounds  He had a toenail removed a couple of weeks ago and hasn't noticed any pain, discharge, bleeding  He is anuric at baseline  Prior to Admission Medications   Prescriptions Last Dose Informant Patient Reported? Taking?    Admelog SoloStar 100 units/mL injection pen  Self Yes No   Sig: Inject 6 Units under the skin 3 (three) times a day with meals    Patient not taking: No sig reported   B-D ULTRAFINE III SHORT PEN 31G X 8 MM MISC  Self No No   Sig: USE 1 PEN NEEDLE 8 TIMES DAILY   GLUCAGON EMERGENCY 1 MG injection  Self Yes No   Sig: INJECT 1MG SUBCUTANEOUSLY AS NEEDED (AS DIRECTED)   MAY REPEAT DOSE EVERY 20 MINUTES AS NEEDED   NIFEdipine ER (ADALAT CC) 60 MG 24 hr tablet  Self No No   Sig: Take 1 tablet (60 mg total) by mouth 2 (two) times a day   Patiromer Sorbitex Calcium (VELTASSA PO)  Self Yes No   Sig: Take 5 g by mouth once a week   amoxicillin (AMOXIL) 500 mg capsule  Self Yes No   Sig: TAKE 4 CAPSULES BY MOUTH 1 HOUR PRIOR TO PROCEDURE   Patient not taking: No sig reported   aspirin (ECOTRIN LOW STRENGTH) 81 mg EC tablet  Self Yes No   Sig: Take 81 mg by mouth daily   atorvastatin (LIPITOR) 40 mg tablet  Self No No   Sig: Take 1 tablet (40 mg total) by mouth every evening   b complex vitamins capsule  Self Yes No   Sig: Take 1 capsule by mouth daily before lunch    calcium acetate (PHOSLO) capsule  Self Yes No   Sig: TAKE 3 CAPSULES BY MOUTH WITH MEALS   cholecalciferol (VITAMIN D3) 1,000 units tablet  Self Yes No   Sig: Take 2,000 Units by mouth daily     cinacalcet (SENSIPAR) 60 MG tablet  Self Yes No   Sig: Take 120 mg by mouth daily 2 tab daily    cloNIDine (CATAPRES-TTS-3) 0 3 mg/24 hr  Self Yes No   Si patch once a week    dextrose 50 %  Self Yes No   Si mL   Patient not taking: No sig reported   doxazosin (CARDURA) 2 mg tablet  Self No No   Sig: TAKE 2 TABLETS BY MOUTH IN THE MORNING AND 2 IN THE EVENING   doxazosin (CARDURA) 8 MG tablet  Self Yes No   Sig: Take 8 mg by mouth daily at bedtime   Patient not taking: No sig reported   escitalopram (LEXAPRO) 20 mg tablet   No No   Sig: Take 1 tablet (20 mg total) by mouth daily   famotidine (PEPCID) 40 MG tablet  Self No No   Sig: Take 1 tablet (40 mg total) by mouth 2 (two) times a day   Patient taking differently: Take 40 mg by mouth in the morning    gabapentin (NEURONTIN) 100 mg capsule  Self No No   Sig: Take 2 tablets at bedtime   hydrALAZINE (APRESOLINE) 100 MG tablet  Self No No   Sig: Take 0 5 tablets (50 mg total) by mouth 2 (two) times a day   Patient not taking: No sig reported   hydrOXYzine HCL (ATARAX) 10 mg tablet  Self No No   Sig: Take 1 tablet (10 mg total) by mouth every 6 (six) hours as needed for itching   insulin aspart (NovoLOG) 100 units/mL injection  Self Yes No   Sig: Inject 25 Units under the skin Sliding scale   insulin glargine (Basaglar KwikPen) 100 units/mL injection pen  Self No No   Sig: Inject 14 Units under the skin daily at bedtime   Patient taking differently: Inject 7 Units under the skin daily at bedtime   labetalol (NORMODYNE) 200 mg tablet  Self No No   Sig: TAKE 2 TABLETS BY MOUTH THREE TIMES DAILY   levothyroxine 25 mcg tablet  Self No No   Sig: Take 1 tablet (25 mcg total) by mouth daily   lisinopril (ZESTRIL) 40 mg tablet  Self Yes No   Sig: Take 80 mg by mouth daily   metolazone (ZAROXOLYN) 10 mg tablet  Self Yes No   Sig: Take 5 mg by mouth daily     minoxidil (LONITEN) 2 5 mg tablet  Self Yes No   Sig: Take 2 5 mg by mouth 2 (two) times a day    ofloxacin (FLOXIN) 0 3 % otic solution   No No   Sig: Administer 10 drops to the right ear daily   ondansetron (ZOFRAN) 4 mg tablet   No No   Sig: Take 1 tablet (4 mg total) by mouth every 8 (eight) hours as needed for nausea or vomiting   torsemide (DEMADEX) 100 mg tablet  Self Yes No   Sig: Take 100 mg by mouth daily     traZODone (DESYREL) 50 mg tablet  Self No No   Sig: Take 1 tablet (50 mg total) by mouth daily at bedtime as needed for sleep   Patient taking differently: Take 25 mg by mouth daily at bedtime as needed for sleep      Facility-Administered Medications: None       Past Medical History:   Diagnosis Date    Acute kidney injury ([facility] )     Ambulates with cane     Anuria     Anxiety     Cellulitis of right elbow 3/31/2021    Chronic kidney disease     Depression     Diabetes mellitus ([facility] )     Diarrhea     Emesis 10/24/2020    End stage renal disease ([facility] ) 2/11/2018    Formatting of this note might be different from the original  Last Assessment & Plan:  Secondary to DM  On nightly PD  Followed by [facility]    Patient considering transplant for kidney and pancreas through [id number] of this note might be different from the original  Last Assessment & Plan:  Formatting of this note might be different from the original  Lab Results  Component Value Date   EGFR     Falls     Gastroparesis     GERD (gastroesophageal reflux disease)     History of shingles 2010    History of transfusion 02/2018    no adverse reaction    Hyperlipidemia     Hyperphosphatemia     Hypertension     Itching     Muscle weakness     general unsteadiness    Obesity (BMI 30 0-34 9) 9/9/2019    PONV (postoperative nausea and vomiting) 1/26/2018    Protein-calorie malnutrition ([address] ) 11/23/2020    Recurrent peritonitis ([address] ) due to peritoneal dialysis catheter 7/31/2020    Retinopathy     Seizures ([address] )     early 2020 - one time    Skin abnormality     some dime size areas where skin was scratched from itching    Spontaneous bacterial peritonitis ([address] ) 10/19/2020    Stroke ([address] )     x2 - off balance/no driving/fatigue    Swelling of both lower extremities     Vomiting     Wears glasses        Past Surgical History:   Procedure Laterality Date    CARDIAC LOOP RECORDER  05/2018    COLONOSCOPY      EGD      EYE SURGERY Right     IR AV FISTULAGRAM/GRAFTOGRAM  2/23/2021    IR TUNNELED CENTRAL LINE PLACEMENT  2/16/2021    IR TUNNELED DIALYSIS CATHETER PLACEMENT  11/18/2020    IR TUNNELED DIALYSIS CATHETER REMOVAL  2/12/2021    IR TUNNELED DIALYSIS CATHETER REMOVAL  3/11/2021    PERITONEAL CATHETER INSERTION N/A 8/27/2018    Procedure: UNROOF PD CATHETER;  Surgeon: Curtis Oneill DO;  [facility];  Service: General    IA ANASTOMOSIS,AV,ANY SITE Left 11/9/2020    Procedure: CREATION FISTULA  ARTERIOVENOUS (AV) - LEFT WRIST;  Surgeon: Edwin Mason MD;  [facility];  Service: Vascular    IA ESOPHAGOGASTRODUODENOSCOPY TRANSORAL DIAGNOSTIC N/A 2019    Procedure: ESOPHAGOGASTRODUODENOSCOPY (EGD); Surgeon: Elisha Santos MD;  Location: [facility]; Service: Gastroenterology    IN LAP INSERTION TUNNELED INTRAPERITONEAL CATHETER N/A 2018    Procedure: LAPAROSCOPIC PD CATHETER PLACEMENT;  Surgeon: Kaycee Hendrix DO;  [facility];  Service: General    IN REMOVAL TUNNELED INTRAPERITONEAL CATHETER N/A 2020    Procedure: REMOVAL CATHETER PERITONEAL DIALYSIS;  Surgeon: Attila Tuttle MD;  [facility];  Service: General    TONSILLECTOMY      UPPER GASTROINTESTINAL ENDOSCOPY         Family History   Problem Relation Age of Onset    Breast cancer Mother     Hypertension Mother     Hyperlipidemia Father     Hypertension Father     Leukemia Maternal Grandmother     Hyperlipidemia Maternal Grandfather     Hypertension Maternal Grandfather     Hyperlipidemia Paternal Grandmother     Hypertension Paternal Grandmother     Heart disease Paternal Grandfather         cardiac disorder    Diabetes Paternal Grandfather      I have reviewed and agree with the history as documented  E-Cigarette/Vaping    E-Cigarette Use Current Every Day User     Comments medical marijuana      E-Cigarette/Vaping Substances    Nicotine No     THC Yes     CBD Yes     Flavoring No     Other No     Unknown No      Social History     Tobacco Use    Smoking status: Former Smoker     Packs/day: 0 50     Years: 12 00     Pack years: 6 00     Types: Cigarettes     Quit date: 2018     Years since quittin 4    Smokeless tobacco: Never Used    Tobacco comment: quit 2018   Vaping Use    Vaping Use: Every day    Substances: THC, CBD   Substance Use Topics    Alcohol use: Yes     Comment: occassionally    Drug use: Yes     Types: Marijuana     Comment: medical marijuana        Review of Systems   Constitutional: Positive for chills  Negative for appetite change and fever     HENT: Negative for sore throat  Respiratory: Negative for cough and shortness of breath  Cardiovascular: Negative for chest pain  Gastrointestinal: Positive for abdominal pain and nausea  Negative for blood in stool and vomiting  Skin: Negative for wound  Neurological: Positive for weakness  All other systems reviewed and are negative  Physical Exam  ED Triage Vitals   Temperature Pulse Respirations Blood Pressure SpO2   07/15/22 0426 07/15/22 0422 07/15/22 0422 07/15/22 0420 07/15/22 0422   (!) 93 8 °F (34 3 °C) 64 18 153/80 100 %      Temp Source Heart Rate Source Patient Position - Orthostatic VS BP Location FiO2 (%)   07/15/22 0426 07/15/22 0422 07/15/22 0615 07/15/22 0615 --   Rectal Monitor Lying Right arm       Pain Score       --                    Orthostatic Vital Signs  Vitals:    07/15/22 0730 07/15/22 0745 07/15/22 0800 07/15/22 0815   BP:   154/77    Pulse: 60 64 64 66   Patient Position - Orthostatic VS:           Physical Exam  Vitals and nursing note reviewed  Constitutional:       General: He is in acute distress (moderate)  Appearance: He is not toxic-appearing  Ill appearance: moderate  HENT:      Head: Normocephalic and atraumatic  Nose: Nose normal       Mouth/Throat:      Mouth: Mucous membranes are dry  Pharynx: Oropharynx is clear  Eyes:      Extraocular Movements: Extraocular movements intact  Pupils: Pupils are equal, round, and reactive to light  Cardiovascular:      Rate and Rhythm: Bradycardia present  Pulses: Normal pulses  Heart sounds: Normal heart sounds  Pulmonary:      Effort: Pulmonary effort is normal       Breath sounds: Normal breath sounds  Abdominal:      General: Abdomen is flat  There is no distension  Palpations: Abdomen is soft  Tenderness: There is abdominal tenderness (LLQ)  There is no guarding or rebound  Musculoskeletal:      Cervical back: Normal range of motion     Skin:     General: Skin is warm and dry  Neurological:      Mental Status: He is alert  Mental status is at baseline  ED Medications  Medications   vancomycin (VANCOCIN) 2,000 mg in sodium chloride 0 9 % 500 mL IVPB (2,000 mg Intravenous New Bag 7/15/22 0732)   insulin lispro (HumaLOG) 100 units/mL subcutaneous injection 1-6 Units (has no administration in time range)   insulin lispro (HumaLOG) 100 units/mL subcutaneous injection 1-5 Units (has no administration in time range)   insulin regular (HumuLIN R,NovoLIN R) injection 4 Units (has no administration in time range)   ondansetron (FOR EMS ONLY) (ZOFRAN) 4 mg/2 mL injection 4 mg (0 mg Does not apply Given to EMS 7/15/22 0422)   cefTRIAXone (ROCEPHIN) IVPB (premix in dextrose) 2,000 mg 50 mL (2,000 mg Intravenous New Bag 7/15/22 0710)       Diagnostic Studies  Results Reviewed     Procedure Component Value Units Date/Time    Fingerstick Glucose (POCT) [[id number]]  (Abnormal) Collected: 07/15/22 0751    Lab Status: Final result Updated: 07/15/22 0758     POC Glucose >500 mg/dl     HS Troponin I 2hr [[id number]]  (Normal) Collected: 07/15/22 0709    Lab Status: Final result Specimen: Blood from Arm, Right Updated: 07/15/22 0757     hs TnI 2hr 22 ng/L      Delta 2hr hsTnI -5 ng/L     Hemoglobin A1c w/EAG Estimation (Orders if not completed within the last 90 days) [[id number]]     Lab Status: No result Specimen: Blood     HS Troponin I 4hr [[id number]]     Lab Status: No result Specimen: Blood     Fingerstick Glucose (POCT) [[id number]]  (Abnormal) Collected: 07/15/22 0703    Lab Status: Final result Updated: 07/15/22 0705     POC Glucose 379 mg/dl     POCT occult blood stool [[id number]]  (Normal) Collected: 07/15/22 0655    Lab Status:  In process Specimen: Stool Updated: 07/15/22 0655     EXT Fecal Occult Blood Negative     Control Valid    FLU/RSV/COVID - if FLU/RSV clinically relevant [[id number]]  (Normal) Collected: 07/15/22 0517    Lab Status: Final result Specimen: Nares from Nose Updated: 07/15/22 0640     SARS-CoV-2 Negative     INFLUENZA A PCR Negative     INFLUENZA B PCR Negative     RSV PCR Negative    Narrative:      FOR PEDIATRIC PATIENTS - copy/paste COVID Guidelines URL to browser: [email or web address]  FriendFeed[employer]    SARS-CoV-2 assay is a Nucleic Acid Amplification assay intended for the  qualitative detection of nucleic acid from SARS-CoV-2 in nasopharyngeal  swabs  Results are for the presumptive identification of SARS-CoV-2 RNA  Positive results are indicative of infection with SARS-CoV-2, the virus  causing COVID-19, but do not rule out bacterial infection or co-infection  with other viruses  Laboratories within the [place] and its  territories are required to report all positive results to the appropriate  public health authorities  Negative results do not preclude SARS-CoV-2  infection and should not be used as the sole basis for treatment or other  patient management decisions  Negative results must be combined with  clinical observations, patient history, and epidemiological information  This test has not been FDA cleared or approved  This test has been authorized by FDA under an Emergency Use Authorization  (EUA)  This test is only authorized for the duration of time the  declaration that circumstances exist justifying the authorization of the  emergency use of an in vitro diagnostic tests for detection of SARS-CoV-2  virus and/or diagnosis of COVID-19 infection under section 564(b)(1) of  the Act, 21 U  S C  570IAA-7(P)(3), unless the authorization is terminated  or revoked sooner  The test has been validated but independent review by FDA  and CLIA is pending  Test performed using [employer] GeneXpert: This RT-PCR assay targets N2,  a region unique to SARS-CoV-2   A conserved region in the E-gene was chosen  for pan-Sarbecovirus detection which includes SARS-CoV-2     TSH, 3rd generation with Free T4 reflex [[id number]] (Normal) Collected: 07/15/22 0525    Lab Status: Final result Specimen: Blood from Arm, Right Updated: 07/15/22 0632     TSH 3RD GENERATON 4 137 uIU/mL     Narrative:      Patients undergoing fluorescein dye angiography may retain small amounts of fluorescein in the body for 48-72 hours post procedure  Samples containing fluorescein can produce falsely depressed TSH values  If the patient had this procedure,a specimen should be resubmitted post fluorescein clearance  Procalcitonin [[id number]]  (Abnormal) Collected: 07/15/22 0522    Lab Status: Final result Specimen: Blood from Arm, Right Updated: 07/15/22 0626     Procalcitonin 0 36 ng/ml     HS Troponin 0hr (reflex protocol) [[id number]]  (Normal) Collected: 07/15/22 0509    Lab Status: Final result Specimen: Blood from Arm, Right Updated: 07/15/22 0623     hs TnI 0hr 27 ng/L     Lactic acid [[id number]]  (Normal) Collected: 07/15/22 0522    Lab Status: Final result Specimen: Blood from Arm, Right Updated: 07/15/22 0615     LACTIC ACID 1 0 mmol/L     Narrative:      Result may be elevated if tourniquet was used during collection      Lipase [[id number]]  (Normal) Collected: 07/15/22 0509    Lab Status: Final result Specimen: Blood from Arm, Right Updated: 07/15/22 0614     Lipase 63 u/L     Comprehensive metabolic panel [[id number]]  (Abnormal) Collected: 07/15/22 0509    Lab Status: Final result Specimen: Blood from Arm, Right Updated: 07/15/22 6215     Sodium 143 mmol/L      Potassium 4 0 mmol/L      Chloride 98 mmol/L      CO2 30 mmol/L      ANION GAP 15 mmol/L      BUN 43 mg/dL      Creatinine 10 32 mg/dL      Glucose 76 mg/dL      Calcium 8 8 mg/dL      AST 24 U/L      ALT 15 U/L      Alkaline Phosphatase 106 U/L      Total Protein 7 2 g/dL      Albumin 4 8 g/dL      Total Bilirubin 0 65 mg/dL      eGFR 5 ml/min/1 73sq m     Narrative:      National Kidney Disease Foundation guidelines for Chronic Kidney Disease (CKD):     Stage 1 with normal or high GFR (GFR > 90 mL/min/1 73 square meters)    Stage 2 Mild CKD (GFR = 60-89 mL/min/1 73 square meters)    Stage 3A Moderate CKD (GFR = 45-59 mL/min/1 73 square meters)    Stage 3B Moderate CKD (GFR = 30-44 mL/min/1 73 square meters)    Stage 4 Severe CKD (GFR = 15-29 mL/min/1 73 square meters)    Stage 5 End Stage CKD (GFR <15 mL/min/1 73 square meters)  Note: GFR calculation is accurate only with a steady state creatinine    Fingerstick Glucose (POCT) [[id number]]  (Abnormal) Collected: 07/15/22 0558    Lab Status: Final result Updated: 07/15/22 0600     POC Glucose 236 mg/dl     CBC and differential [[id number]]  (Abnormal) Collected: 07/15/22 0509    Lab Status: Final result Specimen: Blood from Arm, Right Updated: 07/15/22 0550     WBC 6 06 Thousand/uL      RBC 3 74 Million/uL      Hemoglobin 12 8 g/dL      Hematocrit 37 4 %       fL      MCH 34 2 pg      MCHC 34 2 g/dL      RDW 15 5 %      MPV 10 9 fL      Platelets 225 Thousands/uL      nRBC 0 /100 WBCs      Neutrophils Relative 52 %      Immat GRANS % 1 %      Lymphocytes Relative 28 %      Monocytes Relative 10 %      Eosinophils Relative 8 %      Basophils Relative 1 %      Neutrophils Absolute 3 22 Thousands/µL      Immature Grans Absolute 0 03 Thousand/uL      Lymphocytes Absolute 1 69 Thousands/µL      Monocytes Absolute 0 58 Thousand/µL      Eosinophils Absolute 0 47 Thousand/µL      Basophils Absolute 0 07 Thousands/µL     Blood culture #1 [[id number]] Collected: 07/15/22 0522    Lab Status: In process Specimen: Blood from Arm, Right Updated: 07/15/22 0538    Blood culture #2 [[id number]] Collected: 07/15/22 0522    Lab Status: In process Specimen: Blood from Hand, Right Updated: 07/15/22 0538    Cortisol [[id number]] Collected: 07/15/22 0509    Lab Status:  In process Specimen: Blood from Arm, Right Updated: 07/15/22 0538    Fingerstick Glucose (POCT) [[id number]]  (Normal) Collected: 07/15/22 0422    Lab Status: Final result Updated: 07/15/22 0425     POC Glucose 87 mg/dl                  CT head without contrast   ED Interpretation by Ray Beard MD (57/00 0108)   FINDINGS:     PARENCHYMA:     Stable chronic infarct in the posterior left insular ribbon      No intracranial mass, mass effect or midline shift  No CT signs of acute infarction  No acute parenchymal hemorrhage      VENTRICLES AND EXTRA-AXIAL SPACES:  Normal for the patient's age      VISUALIZED ORBITS AND PARANASAL SINUSES:  Unremarkable      CALVARIUM AND EXTRACRANIAL SOFT TISSUES:  Normal      New right mastoid effusion      IMPRESSION:     1  No acute intracranial CT abnormality  No significant interval change      2  Right mastoid effusion  Correlate for mastoiditis  Final Result by Jamey Tidwell MD (07/15 8007)      1  No acute intracranial CT abnormality  No significant interval change  2   Right mastoid effusion  Correlate for mastoiditis  Workstation performed: [id number]         CT chest abdomen pelvis wo contrast   ED Interpretation by Ray Beard MD (50/48 [id number])   FINDINGS:     CHEST     LUNGS:  Tracheobronchial tree is unremarkable  Bilateral dependent and right basal subsegmental atelectasis      Bilateral subcentimeter pulmonary nodules are stable from chest CT 9/9/2021  More pronounced representatives include     -Left upper lobe 4 mm nodule (series 3 image 52)  - Posterior left upper lobe 3 mm subpleural nodule (series 3 image 21)  -Left lower lobe 7 mm nodule (series 3 image 67)  -Medial left lower lobe 5 mm nodule (series 3 image 80)  -Right upper lobe 4 mm nodule (series 2 image 28)  - Right lower lobe 5 mm nodule (series 3 image 55)  - A 3 mm nodule versus lymph node in the periphery of right middle lobe along the minor fissure (series 3 image 47)        PLEURA:  Unremarkable      HEART/GREAT VESSELS: Normal heart size  Coronary artery atherosclerotic disease    No thoracic aortic aneurysm      MEDIASTINUM AND KTAHERINE: Unremarkable      CHEST WALL AND LOWER NECK:  Unremarkable      ABDOMEN     LIVER/BILIARY TREE:  Unremarkable      GAL   LBLADDER:  Mainly contracted     SPLEEN:  Unremarkable      PANCREAS:  Unremarkable      ADRENAL GLANDS:  Unremarkable      KIDNEYS/URETERS:  Atrophic bilateral kidneys  No hydronephrosis      STOMACH AND BOWEL:  Large amount of hyperattenuating fluid within the stomach and duodenum containing more hyperdense layering fluid  Nondilated fluid filled remaining small bowel  Similar fluid is noted in the proximal ascending colon      APPENDIX:  No findings to suggest appendicitis      ABDOMINOPELVIC CAVITY:  No ascites  No pneumoperitoneum  No lymphadenopathy      VESSELS:  Atherosclerotic changes are present  No evidence of aneurysm      PELVIS     REPRODUCTIVE ORGANS:  Unremarkable for patient's age      URINARY BLADDER:  Partially distended and appear grossly unremarkable      ABDOMINAL WALL/INGUINAL REGIONS:  Unremarkable      OSSEOUS STRUCTURES:  No acute fracture or destructive osseous lesion      IMPRESSION:     1   Large amount of hyperattenuating fluid within the stomach an   d duodenum containing more hyperdense layering fluid  Nondilated fluid filled remaining small bowel  GI bleed is not excluded in appropriate clinical and paraclinical setting      2  No acute finding within the chest      3   Pulmonary nodules measuring up to 7 mm are stable from chest CT 9/9/2021  Based on current Fleischner Society 2017 Guidelines on incidental pulmonary nodule, followup non-contrast CT is recommended at 3-6 months from the initial examination and, if   stable at that time, an additional followup is recommended for 18-24 months from the initial examination  Therefore, next low-dose chest CT follow-up should be 8 months from the current exam       Final Result by Klaus Valencia MD (07/15 4021)      1    Large amount of hyperattenuating fluid within the stomach and duodenum containing more hyperdense layering fluid  Nondilated fluid filled remaining small bowel  GI bleed is not excluded in appropriate clinical and paraclinical setting  2   No acute finding within the chest       3   Pulmonary nodules measuring up to 7 mm are stable from chest CT 9/9/2021  Based on current Fleischner Society 2017 Guidelines on incidental pulmonary nodule, followup non-contrast CT is recommended at 3-6 months from the initial examination and, if    stable at that time, an additional followup is recommended for 18-24 months from the initial examination  Therefore, next low-dose chest CT follow-up should be 8 months from the current exam                    I personally discussed this study with Mateus Doll on 7/15/2022 at 6:35 AM       This study was marked in [facility] for notification and follow-up  Workstation performed: [id number]         XR chest 1 view portable   ED Interpretation by Pranay Leonardo MD (07/15 9983)   No acute disease  Read by me  Procedures  ECG 12 Lead Documentation Only    Date/Time: 7/15/2022 5:42 AM  Performed by: Pranay Leonardo MD  Authorized by:  Pranay Leonardo MD     Indications / Diagnosis:  Hypogycemia   ECG reviewed by me, the ED Provider: yes    Patient location:  ED  Previous ECG:     Previous ECG:  Compared to current  Interpretation:     Interpretation: abnormal    Rate:     ECG rate:  56    ECG rate assessment: bradycardic    Rhythm:     Rhythm: sinus bradycardia    QRS:     QRS axis:  Normal    QRS intervals:  Normal  Conduction:     Conduction: normal    ST segments:     ST segments:  Normal  T waves:     T waves: inverted      Inverted:  V5 and V6          ED Course             HEART Risk Score    Flowsheet Row Most Recent Value   Heart Score Risk Calculator    History 0 Filed at: 07/15/2022 0820   ECG 1 Filed at: 07/15/2022 0820   Age 0 Filed at: 07/15/2022 0820   Risk Factors 1 Filed at: 07/15/2022 0820   Troponin 1 Filed at: 07/15/2022 0820   HEART Score 3 Filed at: 07/15/2022 0820                   Initial Sepsis Screening     Row Name 07/15/22 5709                Is the patient's history suggestive of a new or worsening infection? Yes (Proceed)  -MR        Suspected source of infection suspect infection, source unknown  -MR        Are two or more of the following signs & symptoms of infection both present and new to the patient? --        Indicate SIRS criteria Hypothermia < 36C (96 8F)  -MR        If the answer is yes to both questions, suspicion of sepsis is present --        If severe sepsis is present AND tissue hypoperfusion perists in the hour after fluid resuscitation or lactate > 4, the patient meets criteria for SEPTIC SHOCK --        Are any of the following organ dysfunction criteria present within 6 hours of suspected infection and SIRS criteria that are NOT considered to be chronic conditions? --        Organ dysfunction --        Date of presentation of severe sepsis --        Time of presentation of severe sepsis --        Tissue hypoperfusion persists in the hour after crystalloid fluid administration, evidenced, by either: --        Was hypotension present within one hour of the conclusion of crystalloid fluid administration? --        Date of presentation of septic shock --        Time of presentation of septic shock --              User Key  (r) = Recorded By, (t) = Taken By, (c) = Cosigned By    [address] Name Provider Type    MR German Maldonado MD Resident                          MDM  Number of Diagnoses or Management Options  ESRD on hemodialysis ([facility] [address] )  Hypoglycemia: new and requires workup  Hypothermia, initial encounter: new and requires workup  Mastoiditis, acute, right: new and requires workup  Diagnosis management comments: 36yoM brought in by EMS for hypoglycemic episode  Upon arrival, patient found to also be hypothermic to 93 6, otherwise, other vitals stable, patient afebrile   EKG shows no e/o acute ischemia  HEART score 3  Chest Xray without e/o acute disease  Patient anuric at baseline so no urine was obtained for analysis  Given hypothermia in the setting of hypoglycemia, patient is being worked up for possible infectious cause  White count normal, Hgb stable at baseline  CT head notable for possible right-sided mastoiditis -- although patient denies ear pain and TMs look clear, we initiated treatment for mastoiditis with vanc 2g and ceftriaxone 2g  CT A/P notes hyperattenuated fluid in the stomach and small bowel, although patient denies any symptoms of lower GIB, and guiac was negative; still cannot rule out upper GIB  Patient is draped with bear hugger; he is awake, alert and oriented  Patient discussed with Critical Care team AP, Valdez Sofia, who agrees that patient is stable for SD2  Patient admitted to [facility], under the care of Dr Ren Sawyer  Additionally, a nephro consult was placed after discussing the case with Dr Rosita De Leon (patient is ESRD requiring HD on MWF but missed his Friday HD session due to his presence in the ED)          Amount and/or Complexity of Data Reviewed  Clinical lab tests: ordered and reviewed  Tests in the radiology section of CPT®: ordered and reviewed  Discussion of test results with the performing providers: yes  Obtain history from someone other than the patient: yes  Review and summarize past medical records: yes  Discuss the patient with other providers: yes  Independent visualization of images, tracings, or specimens: yes    Patient Progress  Patient progress: stable      Disposition  Final diagnoses:   Hypoglycemia   Hypothermia, initial encounter   ESRD on hemodialysis ([facility] [address] )   Mastoiditis, acute, right - possible   Hyperglycemia     Time reflects when diagnosis was documented in both MDM as applicable and the Disposition within this note     Time User Action Codes Description Comment    7/15/2022  7:24 AM Radha Lazar Add [E16 2] Hypoglycemia 7/15/2022  7:25 AM Gianfranco Bynum Add [D61  XXXA] Hypothermia, initial encounter     7/15/2022  7:26 AM Gianfranco Bynum Add [N18 6,  Z99 2] ESRD on hemodialysis ([facility] [address] )     7/15/2022  7:30 AM Gianfranco Bynum Add [H70 90] Mastoiditis     7/15/2022  7:30 AM Gianfranco Bynum Remove [H70 90] Mastoiditis     7/15/2022  7:31 AM Gianfranco Bynum Add [H70 001] Mastoiditis, acute, right     7/15/2022  7:31 AM Gianfranco Bynum Modify [H70 001] Mastoiditis, acute, right possible    7/15/2022  8:00 AM Yamile Frizzle Add [R73 9] Hyperglycemia       ED Disposition     ED Disposition   Admit    Condition   Fair    Date/Time   Fri Jul 15, 2022  7:26 AM    Comment   Case was discussed with Anabel Olmos and the patient's admission status was agreed to be Admission Status: inpatient status to the service of Dr Sabi Blake   Follow-up Information    None         Patient's Medications   Discharge Prescriptions    No medications on file     No discharge procedures on file  PDMP Review       Value Time User    PDMP Reviewed  Yes 7/15/2022  4:20 AM Pa Alvarado MD           ED Provider  Attending physically available and evaluated José Antonio Chandler  JASPREET managed the patient along with the ED Attending      Electronically Signed by         Ray Beard MD  07/15/22 1691 Montana Peres MD  07/15/22 6558

## 2022-07-15 NOTE — ED NOTES
Notified provider of glucose > 500  Pt  initially had a glucose of 47 for EMS, pt states he is not hungry and does not want to eat  Provider made aware of elevated glucose reading        Otoniel Chong RN  07/15/22 4922

## 2022-07-15 NOTE — CONSULTS
Consultation - Nephrology   Stoney Carissa Mckeon 43 y o  male MRN: [id number]  Unit/Bed#: ED 11 Encounter: [id number]    ASSESSMENT/PLAN:  ESRD on maintenance HD MWF @ [address] [place] [address] Road :  -last treatment 7/13/2022  -EDW:  93 5 kg  -electrolytes stable, K+ 4 0  -clinically, examines euvolemic  -on transplant list at [facility]  -next treatment today  Will plan to continue regular Monday, Wednesday, Friday schedule during hospitalization  -d/w Dr Tiffanie Fagan    Access:  LUE AVF  -+thrill/bruit   -site clear  -s/p fistulogram with intervention 1 month ago  -continue to use for hemodialysis access    HTN/volume status:  -BP acceptable  -clinically, examines euvolemic  -maintain goal BP <140/90  -home medications:   Clonidine patch 0 3 mg weekly, doxazosin 10 mg qhs, hydralazine 50 mg b i d , labetalol 200 mg t i d , lisinopril 80 mg daily, metolazone 5 mg daily, torsemide 100 mg daily, nifedipine 60 mg b i d   -continue UF with dialysis as BP tolerates  -continue to monitor    Anemia in CKD:  -Hbg 12 8 and at goal   Goal Hgb >10  -continue to monitor  -transfuse for Hgb <7 0    Electrolytes/Acid base:  -stable  -will manage through dialysis  -continue to monitor    Bone mineral disease of renal origin:  -hyperphosphatemia:  Continue binders  On PhosLo  -secondary hyperparathyroidism    continue Sensipar  -continue to monitor phosphorus, PTH, calcium, magnesium as outpatient    Mastoiditis:  -antibiotics and management per primary team    DM I:  -admitted with hypoglycemia  -HgbA1C 5 8  -continue to optimize blood sugar control   -management per primary team      HISTORY OF PRESENT ILLNESS:  Requesting Physician: Kamron Meza MD  Reason for Consult: ESRD on HD    Ya Calderón is a 43y o  year old male with ESRD on maintenance HD MWF at [address] [place] [address] Road (previously [facility]), Hx previous PD, HTN, HLD, DM 1, GERD, hx CVA and 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 who was admitted to [facility] after presenting with hypoglycemia with blood sugar 47 at home  Patient's mother awakened by blood sugar alarm  CT head with right mastoid effusion concerning for mastoiditis  A renal consultation is requested today for assistance in the management of ESRD  Ariel Curtis is a known ESRD patient who undergoes maintenance hemodialysis at [address] [place] [address] on Monday Wednesday, Friday  Last treatment 7/13/2022 with UF to EDW  PAST MEDICAL HISTORY:  Past Medical History:   Diagnosis Date    Acute kidney injury ([address] )     Ambulates with cane     Anuria     Anxiety     Cellulitis of right elbow 3/31/2021    Chronic kidney disease     Depression     Diabetes mellitus ([address] )     Diarrhea     Emesis 10/24/2020    End stage renal disease ([address] ) 2/11/2018    Formatting of this note might be different from the original  Last Assessment & Plan:  Secondary to DM  On nightly PD  Followed by Nephro    Patient considering transplant for kidney and pancreas through [address] of this note might be different from the original  Last Assessment & Plan:  Formatting of this note might be different from the original  Lab Results  Component Value Date   EGFR     Falls     Gastroparesis     GERD (gastroesophageal reflux disease)     History of shingles 2010    History of transfusion 02/2018    no adverse reaction    Hyperlipidemia     Hyperphosphatemia     Hypertension     Itching     Muscle weakness     general unsteadiness    Obesity (BMI 30 0-34 9) 9/9/2019    PONV (postoperative nausea and vomiting) 1/26/2018    Protein-calorie malnutrition ([address] ) 11/23/2020    Recurrent peritonitis ([address] ) due to peritoneal dialysis catheter 7/31/2020    Retinopathy     Seizures ([address] )     early 2020 - one time    Skin abnormality     some dime size areas where skin was scratched from itching    Spontaneous bacterial peritonitis ([address] ) 10/19/2020    Stroke ([address] )     x2 - off balance/no driving/fatigue    Swelling of both lower extremities     Vomiting     Wears glasses        PAST SURGICAL HISTORY:  Past Surgical History:   Procedure Laterality Date    CARDIAC LOOP RECORDER  2018    COLONOSCOPY      EGD      EYE SURGERY Right     IR AV FISTULAGRAM/GRAFTOGRAM  2021    IR TUNNELED CENTRAL LINE PLACEMENT  2021    IR TUNNELED DIALYSIS CATHETER PLACEMENT  2020    IR TUNNELED DIALYSIS CATHETER REMOVAL  2021    IR TUNNELED DIALYSIS CATHETER REMOVAL  3/11/2021    PERITONEAL CATHETER INSERTION N/A 2018    Procedure: UNROOF PD CATHETER;  Surgeon: Zechariah Burciaga DO;  [facility]: [facility];  Service: General    [place] ANASTOMOSIS,AV,ANY SITE Left 2020    Procedure: CREATION FISTULA  ARTERIOVENOUS (AV) - LEFT WRIST;  Surgeon: Kiki Martinez MD;  [facility];  Service: Vascular    [place] ESOPHAGOGASTRODUODENOSCOPY TRANSORAL DIAGNOSTIC N/A 2019    Procedure: ESOPHAGOGASTRODUODENOSCOPY (EGD); Surgeon: Carine Mantilla MD;  Location: [facility];   Service: Gastroenterology    [place] LAP INSERTION TUNNELED INTRAPERITONEAL CATHETER N/A 2018    Procedure: LAPAROSCOPIC PD CATHETER PLACEMENT;  Surgeon: Zechariah Burciaga DO;  [facility];  Service: General    [place] REMOVAL TUNNELED INTRAPERITONEAL CATHETER N/A 2020    Procedure: REMOVAL CATHETER PERITONEAL DIALYSIS;  Surgeon: Roberta Ruiz MD;  [facility];  Service: General    TONSILLECTOMY      UPPER GASTROINTESTINAL ENDOSCOPY         ALLERGIES:  Allergies   Allergen Reactions    Sulfa Antibiotics Rash       SOCIAL HISTORY:  Social History     Substance and Sexual Activity   Alcohol Use Yes    Comment: occassionally     Social History     Substance and Sexual Activity   Drug Use Yes    Types: Marijuana    Comment: medical marijuana     Social History     Tobacco Use   Smoking Status Former Smoker    Packs/day: 0 50    Years: 12 00    Pack years: 6 00    Types: Cigarettes    Quit date: 2018    Years since quittin 4   Smokeless Tobacco Never Used   Tobacco Comment    quit 2/2018       FAMILY HISTORY:  Family History   Problem Relation Age of Onset   Lurdes Palma Breast cancer Mother     Hypertension Mother     Hyperlipidemia Father     Hypertension Father     Leukemia Maternal Grandmother     Hyperlipidemia Maternal Grandfather     Hypertension Maternal Grandfather     Hyperlipidemia Paternal Grandmother     Hypertension Paternal Grandmother     Heart disease Paternal Grandfather         cardiac disorder    Diabetes Paternal Grandfather        MEDICATIONS:    Current Facility-Administered Medications:     insulin lispro (HumaLOG) 100 units/mL subcutaneous injection 1-5 Units, 1-5 Units, Subcutaneous, HS, DEISI Guzman    insulin lispro (HumaLOG) 100 units/mL subcutaneous injection 1-6 Units, 1-6 Units, Subcutaneous, TID AC **AND** Fingerstick Glucose (POCT), , , TID AC, DEISI Guzman    insulin regular (HumuLIN R,NovoLIN R) injection 4 Units, 4 Units, Subcutaneous, Once, Reji Joe MD    vancomycin (VANCOCIN) 2,000 mg in sodium chloride 0 9 % 500 mL IVPB, 20 mg/kg, Intravenous, Once, Anastasiya Milton MD, Last Rate: 250 mL/hr at 07/15/22 0732, 2,000 mg at 07/15/22 7420    Current Outpatient Medications:     ondansetron (ZOFRAN) 4 mg tablet, Take 1 tablet (4 mg total) by mouth every 8 (eight) hours as needed for nausea or vomiting, Disp: 90 tablet, Rfl: 0    Admelog SoloStar 100 units/mL injection pen, Inject 6 Units under the skin 3 (three) times a day with meals  (Patient not taking: No sig reported), Disp: , Rfl:     amoxicillin (AMOXIL) 500 mg capsule, TAKE 4 CAPSULES BY MOUTH 1 HOUR PRIOR TO PROCEDURE (Patient not taking: No sig reported), Disp: , Rfl:     aspirin (ECOTRIN LOW STRENGTH) 81 mg EC tablet, Take 81 mg by mouth daily, Disp: , Rfl:     atorvastatin (LIPITOR) 40 mg tablet, Take 1 tablet (40 mg total) by mouth every evening, Disp: 90 tablet, Rfl: 1    b complex vitamins capsule, Take 1 capsule by mouth daily before lunch , Disp: , Rfl:     B-D ULTRAFINE III SHORT PEN 31G X 8 MM MISC, USE 1 PEN NEEDLE 8 TIMES DAILY, Disp: 100 each, Rfl: 47    calcium acetate (PHOSLO) capsule, TAKE 3 CAPSULES BY MOUTH WITH MEALS, Disp: , Rfl:     cholecalciferol (VITAMIN D3) 1,000 units tablet, Take 2,000 Units by mouth daily  , Disp: , Rfl:     cinacalcet (SENSIPAR) 60 MG tablet, Take 120 mg by mouth daily 2 tab daily , Disp: , Rfl:     cloNIDine (CATAPRES-TTS-3) 0 3 mg/24 hr, 1 patch once a week , Disp: , Rfl:     dextrose 50 %, 25 mL (Patient not taking: No sig reported), Disp: , Rfl:     doxazosin (CARDURA) 2 mg tablet, TAKE 2 TABLETS BY MOUTH IN THE MORNING AND 2 IN THE EVENING, Disp: 120 tablet, Rfl: 0    doxazosin (CARDURA) 8 MG tablet, Take 8 mg by mouth daily at bedtime (Patient not taking: No sig reported), Disp: , Rfl:     escitalopram (LEXAPRO) 20 mg tablet, Take 1 tablet (20 mg total) by mouth daily, Disp: 90 tablet, Rfl: 2    famotidine (PEPCID) 40 MG tablet, Take 1 tablet (40 mg total) by mouth 2 (two) times a day (Patient taking differently: Take 40 mg by mouth in the morning ), Disp: 60 tablet, Rfl: 5    gabapentin (NEURONTIN) 100 mg capsule, Take 2 tablets at bedtime, Disp: 60 capsule, Rfl: 5    GLUCAGON EMERGENCY 1 MG injection, INJECT 1MG SUBCUTANEOUSLY AS NEEDED (AS DIRECTED)   MAY REPEAT DOSE EVERY 20 MINUTES AS NEEDED, Disp: , Rfl: 3    hydrALAZINE (APRESOLINE) 100 MG tablet, Take 0 5 tablets (50 mg total) by mouth 2 (two) times a day (Patient not taking: No sig reported), Disp: 90 tablet, Rfl: 1    hydrOXYzine HCL (ATARAX) 10 mg tablet, Take 1 tablet (10 mg total) by mouth every 6 (six) hours as needed for itching, Disp: 30 tablet, Rfl: 0    insulin aspart (NovoLOG) 100 units/mL injection, Inject 25 Units under the skin Sliding scale, Disp: , Rfl:     insulin glargine (Basaglar KwikPen) 100 units/mL injection pen, Inject 14 Units under the skin daily at bedtime (Patient taking differently: Inject 7 Units under the skin daily at bedtime), Disp: , Rfl:     labetalol (NORMODYNE) 200 mg tablet, TAKE 2 TABLETS BY MOUTH THREE TIMES DAILY, Disp: 180 tablet, Rfl: 0    levothyroxine 25 mcg tablet, Take 1 tablet (25 mcg total) by mouth daily, Disp: 30 tablet, Rfl: 1    lisinopril (ZESTRIL) 40 mg tablet, Take 80 mg by mouth daily, Disp: , Rfl:     metolazone (ZAROXOLYN) 10 mg tablet, Take 5 mg by mouth daily  , Disp: , Rfl:     minoxidil (LONITEN) 2 5 mg tablet, Take 2 5 mg by mouth 2 (two) times a day , Disp: , Rfl:     NIFEdipine ER (ADALAT CC) 60 MG 24 hr tablet, Take 1 tablet (60 mg total) by mouth 2 (two) times a day, Disp: 180 tablet, Rfl: 0    ofloxacin (FLOXIN) 0 3 % otic solution, Administer 10 drops to the right ear daily, Disp: 5 mL, Rfl: 0    Patiromer Sorbitex Calcium (VELTASSA PO), Take 5 g by mouth once a week, Disp: , Rfl:     torsemide (DEMADEX) 100 mg tablet, Take 100 mg by mouth daily  , Disp: , Rfl:     traZODone (DESYREL) 50 mg tablet, Take 1 tablet (50 mg total) by mouth daily at bedtime as needed for sleep (Patient taking differently: Take 25 mg by mouth daily at bedtime as needed for sleep), Disp: 30 tablet, Rfl: 1    REVIEW OF SYSTEMS:  A complete 10 point review of systems was performed and found to be negative unless otherwise noted below or in the HPI  General: No fevers, chills  Cardiovascular: No chest pain, shortness of breath, palpitations, leg edema  Respiratory: No cough, sputum production, shortness of breath  Gastrointestinal: No nausea, vomiting, abdominal pain, diarrhea  Genitourinary: No hematuria      PHYSICAL EXAM:  Current Weight:    First Weight:    Vitals:    07/15/22 0615 07/15/22 0728 07/15/22 0730 07/15/22 0745   BP: 136/72      BP Location: Right arm      Pulse: (!) 54  60 64   Resp: 18      Temp:  97 7 °F (36 5 °C)     TempSrc:  Oral     SpO2: 100%  100% 100%     No intake or output data in the 24 hours ending 07/15/22 0803  General:  Awake, alert, appears comfortable and in no acute distress  Nontoxic  Skin:  No rash, warm, good skin turgor   Eyes:  PERRL, EOMI, sclerae nonicteric   no periorbital edema   ENT:  Moist mucous membranes  Neck:  Trachea midline, symmetric  No JVD  No carotid bruits  Chest:  Clear to auscultation bilaterally without wheezes, crackles or rhonchi  CVS:  Regular rate and rhythm without murmur, gallop or rub  S1 and S2 identified and normal   No S3, S4    Abdomen:  Soft, nontender, nondistended without masses  Normal bowel sounds x 4 quadrants  No bruit  Extremities:  Warm, pink, motor and sensory intact and well perfused  No cyanosis, pallor  No BLE edema  Multiple excoriations bilateral lower extremities  Neuro:  Awake, alert, oriented x3  Grossly intact  Psych:  Appropriate affect  Mentating appropriately  Normal mental status exam  Access:  LUE AVF with +thrill, bruit  Site clear  Invasive Devices:  None     Lab Results:   Results from last 7 days   Lab Units 07/15/22  0509   WBC Thousand/uL 6 06   HEMOGLOBIN g/dL 12 8   HEMATOCRIT % 37 4   PLATELETS Thousands/uL 217   SODIUM mmol/L 143   POTASSIUM mmol/L 4 0   CHLORIDE mmol/L 98   CO2 mmol/L 30   BUN mg/dL 43*   CREATININE mg/dL 10 32*   CALCIUM mg/dL 8 8   ALK PHOS U/L 106*   ALT U/L 15   AST U/L 24     Lab Results   Component Value Date     2 (H) 10/22/2020    CALCIUM 8 8 07/15/2022    PHOS 4 8 (H) 12/15/2020   Imaging study:  CT chest, abdomen, pelvis 7/15/2022:  Imaging study reviewed  Hyperattenuating fluid in the stomach and do DM  Bilateral subcentimeter pulmonary nodules  CXR 7/15/2022:  Imaging study reviewed  No pleural effusion, consolidation, pneumothorax or pulmonary edema    CT head 7/15/2022:  Imaging study reviewed  Right mastoid effusion possible mastoiditis    No acute intracranial abnormality, mass effect, midline shift, hemorrhage or infarct    EMR, including Care Everywhere, Epic,  [facility] and outpatient notes reviewed  I have personally reviewed the blood work as stated above and in my note  I have personally reviewed CT chest, abdomen, pelvis, CXR, CT head imaging studies  I have personally reviewed primary medical service and previous nephrology note

## 2022-07-15 NOTE — ASSESSMENT & PLAN NOTE
Lab Results   Component Value Date    HGBA1C 5 8 (H) 09/03/2021       Recent Labs     07/15/22  0422 07/15/22  0558 07/15/22  0703 07/15/22  0751   POCGLU 87 236* 379* >500*       Blood Sugar Average: Last 72 hrs:  (P) 234     Monday Wednesday Friday, nephrology consulted  For hemodialysis today

## 2022-07-15 NOTE — PLAN OF CARE
Patient is for a 3 hour HD session on a 3K2 5Ca bath for a serum potassium of 4 0 mmol/L and a net UF goal of 3L as tolerated  Post-Dialysis RN Treatment Note    Blood Pressure:  Pre 200/116 mm/Hg  Post 207/95 mmHg   EDW  93 5 kg    Weight:  Pre 96 1 kg kg   Post 93 8 kg   Mode of weight measurement: Standing Scale   Volume Removed 2800 ml/ gross goal    Treatment duration 180 minutes    NS given  No    Treatment shortened?  No   Medications given during Rx None Reported   Estimated Kt/V  Not Applicable   Access type: AV fistula   Access Issues: No    Report called to primary nurse   Yes     Problem: METABOLIC, FLUID AND ELECTROLYTES - ADULT  Goal: Electrolytes maintained within normal limits  Description: INTERVENTIONS:  - Monitor labs and assess patient for signs and symptoms of electrolyte imbalances  - Administer electrolyte replacement as ordered  - Monitor response to electrolyte replacements, including repeat lab results as appropriate  - Instruct patient on fluid and nutrition as appropriate  Outcome: Progressing  Goal: Fluid balance maintained  Description: INTERVENTIONS:  - Monitor labs   - Monitor I/O and WT  - Instruct patient on fluid and nutrition as appropriate  - Assess for signs & symptoms of volume excess or deficit  Outcome: Progressing

## 2022-07-15 NOTE — ED ATTENDING ATTESTATION
7/15/2022  I, Teena Sparks MD, saw and evaluated the patient  I have discussed the patient with the resident/non-physician practitioner and agree with the resident's/non-physician practitioner's findings, Plan of Care, and MDM as documented in the resident's/non-physician practitioner's note, except where noted  All available labs and Radiology studies were reviewed  I was present for key portions of any procedure(s) performed by the resident/non-physician practitioner and I was immediately available to provide assistance  At this point I agree with the current assessment done in the Emergency Department  I have conducted an independent evaluation of this patient a history and physical is as follows: Patient is a 43year old male whose blood sugar alarm woke up his mother and patient had AMS and blood sugar was 47  Patient ate foods with sugar and blood sugar went up to 71  Patient states he had dinner last night and took his usual dose of insulin last night  Patient had high blood sugar over 300 yesterday afternoon which he took short acting insulin for  Patient is on dialysis and is due for dialysis today  No fever  No N/v/D  Patient does not produce urine  No headache  No cough  Was last seen in this ED on 5/31/22 for R OE  [facility] website checked on this patient and last Rx filled was on 11/11/20 for percocet for 3 day supply  Patient needed our urgent attention  Normocephalic  Mucous membranes somewhat moist  Neck nontender and supple  Heart regular without murmur  Lungs clear  Abdomen tender on left side  Good bowel sounds  No LE edema  No rash noted  Differential diagnosis including but not limited to: poor oral intake, infection, metabolic abnormality, dehydration, cardiac etiology, intracranial process  Differential diagnosis including but not limited to: adrenal disease, thyroid disease, trauma  Will check labs and CXR and CTs and apply warming blanket and monitor blood sugar   Patient will need admission       ED Course         Critical Care Time  Procedures

## 2022-07-15 NOTE — SEPSIS NOTE
Sepsis Note   Thelbert Bamberger Mulberger 43 y o  male MRN: [id number]  Unit/Bed#: ED 11 Encounter: [id number]       qSOFA     [address] Name 07/15/22 0504 07/15/22 0422 07/15/22 0420          Altered mental status GCS < 15 0 -- --      Respiratory Rate > / =22 -- 0 --      Systolic BP < / =229 -- -- 0      Q Sofa Score 0 0 --                 Initial Sepsis Screening     Row Name 07/15/22 5764                Is the patient's history suggestive of a new or worsening infection? Yes (Proceed)  -MR        Suspected source of infection suspect infection, source unknown  -MR        Are two or more of the following signs & symptoms of infection both present and new to the patient? --        Indicate SIRS criteria Hypothermia < 36C (96 8F)  -MR        If the answer is yes to both questions, suspicion of sepsis is present --        If severe sepsis is present AND tissue hypoperfusion perists in the hour after fluid resuscitation or lactate > 4, the patient meets criteria for SEPTIC SHOCK --        Are any of the following organ dysfunction criteria present within 6 hours of suspected infection and SIRS criteria that are NOT considered to be chronic conditions?  --        Organ dysfunction --        Date of presentation of severe sepsis --        Time of presentation of severe sepsis --        Tissue hypoperfusion persists in the hour after crystalloid fluid administration, evidenced, by either: --        Was hypotension present within one hour of the conclusion of crystalloid fluid administration? --        Date of presentation of septic shock --        Time of presentation of septic shock --              User Key  (r) = Recorded By, (t) = Taken By, (c) = Cosigned By    [address] Name Provider Type    MR Pranay Leonardo MD Resident

## 2022-07-15 NOTE — CONSULTS
Consultation - Vivekmary Babar Mckeon 43 y o  male MRN: [id number]    Unit/Bed#: S -01 Encounter: [id number]      Assessment/Plan     Assessment: This is a 43y o -year-old male with type 1 diabetes, with microvascular and macrovascular complication- ESRD on dialysis, diabetic neuropathy, retinopathy, gastroparesis on long term insulin therapy admitted for an episode of hypoglycemia  Past with end stage renal disease has diminished counter regulatory mechanisms to hypoglycemia as well as increase sensiivty to insulin, making him prone to hypoglycemic episodes  Last HBA1C 6,7%  Today patient has no complaint and feels better  No signs of hypoglycemia    Plan:  -Continue blood glucose checks three time a day, insuline regiment modified  -insuline glargine 9 units subcutaneous at bedtime  -Insulin Lispro 3 units , subcutneous three times a day after meals due to gastroparesis  Insuline lispro, correction scale  If -189 give 1 unit of insulin     -229 give 2 units of insulin     -374 give 3 units     -445 give 4 units of insulin     BG ABOVE 500 give 5 unit of insulin      CC: Diabetes Consult    History of Present Illness     HPI: Nura Burns is a 43y o  year old male with type 1 diabetes for 34 years  He is on insulin at home  He denies any polyuria, polydipsia, nocturia and blurry vision  Patient has multiple macrovascular and microvascular complications of diabetes  ESRD on dialysis for 4 years, diabetic retinopathy on monthly eye laser therapy, has lacunar stroke in 2015, and another pontine stroke in 2018  He has had several episodes of hypo and hyperglycemia admissions  Hi insulin  Regime prior to hospitlalization- lispro 6 units sc, three times a day at meal time, Lantus 9 unit at bedtime, no correctional scale  H was admitted to the hospital for hypoglycemia   Patient noticed his glucose was 319 mg at 1 am last night, gave additional dose of  6 units lispro insulin and went to bed  Glucose gradually dropped to about 54 mg and his mother gave him glucose and oatmeal  Despite that, continoues drop to 47 mg and eventually became dizzy and less conscious  Blood sugar at 7 am was above 500  Patient self injected 6 units at 10 am an 12  noon    Inpatient consult to Endocrinology  Consult performed by: Marilee Padgett MD  Consult ordered by: Jaciel Law MD          Review of Systems   Constitutional: Negative for activity change and appetite change  Respiratory: Negative for cough, chest tightness and shortness of breath  Cardiovascular: Negative for chest pain, palpitations and leg swelling  Gastrointestinal: Negative for abdominal distention and abdominal pain  Endocrine: Negative for cold intolerance, heat intolerance, polydipsia and polyphagia  Genitourinary: Negative for difficulty urinating  Neurological: Positive for tremors  Negative for dizziness, seizures and weakness  Historical Information   Past Medical History:   Diagnosis Date    [facility]     Ambulates with cane     Anuria     Anxiety     Cellulitis of right elbow 3/31/2021    Chronic kidney disease     Depression     Diabetes mellitus ([facility] )     Diarrhea     Emesis 10/24/2020    End stage renal disease ([facility] ) 2/11/2018    Formatting of this note might be different from the original  Last Assessment & Plan:  Secondary to DM  On nightly PD  Followed by [facility]    Patient considering transplant for kidney and pancreas through [address] of this note might be different from the original  Last Assessment & Plan:  Formatting of this note might be different from the original  Lab Results  Component Value Date   EGFR     Falls     Gastroparesis     GERD (gastroesophageal reflux disease)     History of shingles 2010    History of transfusion 02/2018    no adverse reaction    Hyperlipidemia     Hyperphosphatemia     Hypertension     Itching     Muscle weakness     general unsteadiness    Obesity (BMI 30 0-34 9) 9/9/2019    PONV (postoperative nausea and vomiting) 1/26/2018    Protein-calorie malnutrition [facility] ) 11/23/2020    Recurrent peritonitis ([facility] ) due to peritoneal dialysis catheter 7/31/2020    Retinopathy     Seizures ([facility] )     early 2020 - one time    Skin abnormality     some dime size areas where skin was scratched from itching    Spontaneous bacterial peritonitis ([facility] ) 10/19/2020    [facility]     x2 - off balance/no driving/fatigue    Swelling of both lower extremities     Vomiting     Wears glasses      Past Surgical History:   Procedure Laterality Date    CARDIAC LOOP RECORDER  05/2018    COLONOSCOPY      EGD      EYE SURGERY Right     IR AV FISTULAGRAM/GRAFTOGRAM  2/23/2021    IR TUNNELED CENTRAL LINE PLACEMENT  2/16/2021    IR TUNNELED DIALYSIS CATHETER PLACEMENT  11/18/2020    IR TUNNELED DIALYSIS CATHETER REMOVAL  2/12/2021    IR TUNNELED DIALYSIS CATHETER REMOVAL  3/11/2021    PERITONEAL CATHETER INSERTION N/A 8/27/2018    Procedure: UNROOF PD CATHETER;  Surgeon: Deanna Tabares DO;  [facility];  Service: General    AZ ANASTOMOSIS,AV,ANY SITE Left 11/9/2020    Procedure: CREATION FISTULA  ARTERIOVENOUS (AV) - LEFT WRIST;  Surgeon: Keith Mayes MD;  [facility];  Service: Vascular    AZ ESOPHAGOGASTRODUODENOSCOPY TRANSORAL DIAGNOSTIC N/A 4/18/2019    Procedure: ESOPHAGOGASTRODUODENOSCOPY (EGD); Surgeon: Sandra Roberts MD;  Location: [facility];   Service: Gastroenterology    [place] LAP INSERTION TUNNELED INTRAPERITONEAL CATHETER N/A 8/6/2018    Procedure: LAPAROSCOPIC PD CATHETER PLACEMENT;  Surgeon: Deanna Tabares DO;  [facility];  Service: General    [place] REMOVAL TUNNELED INTRAPERITONEAL CATHETER N/A 11/18/2020    Procedure: REMOVAL CATHETER PERITONEAL DIALYSIS;  Surgeon: Yenni Pulido MD;  [facility];  Service: General    TONSILLECTOMY      UPPER GASTROINTESTINAL ENDOSCOPY Social History   Social History     Substance and Sexual Activity   Alcohol Use Yes    Comment: occassionally     Social History     Substance and Sexual Activity   Drug Use Yes    Types: Marijuana    Comment: medical marijuana     Social History     Tobacco Use   Smoking Status Former Smoker    Packs/day: 0 50    Years: 12 00    Pack years: 6 00    Types: Cigarettes    Quit date: 2018    Years since quittin 4   Smokeless Tobacco Never Used   Tobacco Comment    quit 2018     Family History:   Family History   Problem Relation Age of Onset   [facility] Breast cancer Mother     Hypertension Mother     Hyperlipidemia Father     Hypertension Father     Leukemia Maternal Grandmother     Hyperlipidemia Maternal Grandfather     Hypertension Maternal Grandfather     Hyperlipidemia Paternal Grandmother     Hypertension Paternal Grandmother     Heart disease Paternal Grandfather         cardiac disorder    Diabetes Paternal Grandfather        Meds/Allergies   Current Facility-Administered Medications   Medication Dose Route Frequency Provider Last Rate Last Admin    acetaminophen (TYLENOL) tablet 650 mg  650 mg Oral Q6H PRN Maegan White MD        aspirin (ECOTRIN LOW STRENGTH) EC tablet 81 mg  81 mg Oral Daily Maegan White MD   81 mg at 07/15/22 1234    atorvastatin (LIPITOR) tablet 40 mg  40 mg Oral QPM Maegan White MD        calcium acetate (PHOSLO) capsule 2,001 mg  2,001 mg Oral TID With Meals Maegan White MD   2,001 mg at 07/15/22 1234    cholecalciferol (VITAMIN D3) tablet 2,000 Units  2,000 Units Oral Daily Maegan White MD   2,000 Units at 07/15/22 1234    cinacalcet (SENSIPAR) tablet 120 mg  120 mg Oral Daily Maegan White MD   120 mg at 07/15/22 1234    [START ON 2022] cloNIDine (CATAPRES-TTS-3) 0 3 mg/24 hr TD weekly patch  1 patch Transdermal Weekly Maegan White MD        escitalopram (LEXAPRO) tablet 20 mg  20 mg Oral Daily Natalia MCGUIRE Moise Wilder MD        famotidine (PEPCID) tablet 40 mg  40 mg Oral Daily Colon Girt, MD        gabapentin (NEURONTIN) capsule 200 mg  200 mg Oral HS Colon Girt, MD        heparin (porcine) subcutaneous injection 5,000 Units  5,000 Units Subcutaneous Q8H 1000 Anders Acevedo MD        hydrOXYzine HCL (ATARAX) tablet 10 mg  10 mg Oral Q6H PRN Colon Girt, MD        insulin glargine (LANTUS) subcutaneous injection 9 Units 0 09 mL  9 Units Subcutaneous HS Colon Girt, MD        insulin lispro (HumaLOG) 100 units/mL subcutaneous injection 1-5 Units  1-5 Units Subcutaneous HS Nuris DEISI Yusuf        insulin lispro (HumaLOG) 100 units/mL subcutaneous injection 1-6 Units  1-6 Units Subcutaneous TID AC DEISI Guzman        insulin regular (HumuLIN R,NovoLIN R) injection 4 Units  4 Units Subcutaneous Once Isael MD Jose Daniel        labetalol (NORMODYNE) tablet 400 mg  400 mg Oral Q8H [address] Colon Girt, MD        lisinopril (ZESTRIL) tablet 40 mg  40 mg Oral BID Colon Girt, MD   40 mg at 07/15/22 1234    multivitamin stress formula tablet 1 tablet  1 tablet Oral Daily Before Lunch Colon Girt, MD   1 tablet at 07/15/22 1234    NIFEdipine (PROCARDIA XL) 24 hr tablet 60 mg  60 mg Oral HS Colon Girt, MD        ondansetron (ZOFRAN) injection 4 mg  4 mg Intravenous Q6H PRN Colon Girt, MD        torsemide (DEMADEX) tablet 100 mg  100 mg Oral Daily Colon Girt, MD   100 mg at 07/15/22 1234    traZODone (DESYREL) tablet 25 mg  25 mg Oral HS PRN Colon Girt, MD         Allergies   Allergen Reactions    Sulfa Antibiotics Rash       Objective   Vitals: Blood pressure (!) 183/101, pulse 78, temperature 98 6 °F (37 °C), temperature source Tympanic, resp  rate 20, SpO2 100 %      Intake/Output Summary (Last 24 hours) at 7/15/2022 1521  Last data filed at 7/15/2022 1430  Gross per 24 hour   Intake 200 ml   Output --   Net 200 ml     Invasive Devices  Report    Peripheral Intravenous Line  Duration           Peripheral IV 07/15/22 Right Antecubital <1 day          Line  Duration           Hemodialysis AV Fistula Left Forearm -- days    Hemodialysis AV Fistula 11/09/20 Left Other (Comment) 613 days                Physical Exam  Constitutional:       Appearance: Normal appearance  HENT:      Head: Normocephalic  Cardiovascular:      Rate and Rhythm: Normal rate and regular rhythm  Pulses: Normal pulses  Heart sounds: Normal heart sounds  Pulmonary:      Effort: Pulmonary effort is normal  No respiratory distress  Breath sounds: Normal breath sounds  Abdominal:      General: Abdomen is flat  Palpations: Abdomen is soft  Neurological:      General: No focal deficit present  Mental Status: He is alert and oriented to person, place, and time  Psychiatric:         Mood and Affect: Mood normal          The history was obtained from the review of the chart, patient  Lab Results:   Results from last 7 days   Lab Units 07/15/22  0509   HEMOGLOBIN A1C % 6 7*     Lab Results   Component Value Date    WBC 6 06 07/15/2022    HGB 12 8 07/15/2022    HCT 37 4 07/15/2022     (H) 07/15/2022     07/15/2022     Lab Results   Component Value Date/Time    BUN 43 (H) 07/15/2022 05:09 AM    BUN 34 (H) 11/03/2015 11:32 AM     10/28/2015 05:43 PM    K 4 0 07/15/2022 05:09 AM    K 3 6 10/28/2015 05:43 PM    CL 98 07/15/2022 05:09 AM     10/28/2015 05:43 PM    CO2 30 07/15/2022 05:09 AM    CO2 24 02/21/2020 08:50 AM    CREATININE 10 32 (H) 07/15/2022 05:09 AM    CREATININE 1 90 (H) 11/03/2015 11:33 AM    AST 24 07/15/2022 05:09 AM    AST 10 10/28/2015 05:43 PM    ALT 15 07/15/2022 05:09 AM    ALT 25 10/28/2015 05:43 PM    ALB 4 8 07/15/2022 05:09 AM    ALB 4 0 10/28/2015 05:43 PM     No results for input(s): CHOL, HDL, LDL, TRIG, VLDL in the last 72 hours    No results found for: Diane Wilburn, [id number]  POC Glucose (mg/dl)   Date Value   07/15/2022 499 (H)   07/15/2022 >500 (HH)   07/15/2022 379 (H)   07/15/2022 236 (H)   07/15/2022 87   07/29/2021 149 (H)   04/04/2021 155 (H)   04/04/2021 162 (H)   04/03/2021 231 (H)   04/03/2021 197 (H)           Portions of the record may have been created with voice recognition software

## 2022-07-16 VITALS
BODY MASS INDEX: 28.84 KG/M2 | WEIGHT: 206.79 LBS | RESPIRATION RATE: 18 BRPM | DIASTOLIC BLOOD PRESSURE: 85 MMHG | OXYGEN SATURATION: 99 % | TEMPERATURE: 98.6 F | SYSTOLIC BLOOD PRESSURE: 149 MMHG | HEART RATE: 79 BPM

## 2022-07-16 LAB
ANION GAP SERPL CALCULATED.3IONS-SCNC: 11 MMOL/L (ref 4–13)
BASOPHILS # BLD AUTO: 0.07 THOUSANDS/ΜL (ref 0–0.1)
BASOPHILS NFR BLD AUTO: 1 % (ref 0–1)
BUN SERPL-MCNC: 30 MG/DL (ref 5–25)
CALCIUM SERPL-MCNC: 8.6 MG/DL (ref 8.4–10.2)
CHLORIDE SERPL-SCNC: 98 MMOL/L (ref 96–108)
CO2 SERPL-SCNC: 29 MMOL/L (ref 21–32)
CREAT SERPL-MCNC: 8.51 MG/DL (ref 0.6–1.3)
EOSINOPHIL # BLD AUTO: 0.31 THOUSAND/ΜL (ref 0–0.61)
EOSINOPHIL NFR BLD AUTO: 6 % (ref 0–6)
ERYTHROCYTE [DISTWIDTH] IN BLOOD BY AUTOMATED COUNT: 15.4 % (ref 11.6–15.1)
GFR SERPL CREATININE-BSD FRML MDRD: 7 ML/MIN/1.73SQ M
GLUCOSE SERPL-MCNC: 215 MG/DL (ref 65–140)
GLUCOSE SERPL-MCNC: 254 MG/DL (ref 65–140)
HCT VFR BLD AUTO: 34.2 % (ref 36.5–49.3)
HGB BLD-MCNC: 11.4 G/DL (ref 12–17)
IMM GRANULOCYTES # BLD AUTO: 0.02 THOUSAND/UL (ref 0–0.2)
IMM GRANULOCYTES NFR BLD AUTO: 0 % (ref 0–2)
LYMPHOCYTES # BLD AUTO: 1.42 THOUSANDS/ΜL (ref 0.6–4.47)
LYMPHOCYTES NFR BLD AUTO: 26 % (ref 14–44)
MCH RBC QN AUTO: 34 PG (ref 26.8–34.3)
MCHC RBC AUTO-ENTMCNC: 33.3 G/DL (ref 31.4–37.4)
MCV RBC AUTO: 102 FL (ref 82–98)
MONOCYTES # BLD AUTO: 0.48 THOUSAND/ΜL (ref 0.17–1.22)
MONOCYTES NFR BLD AUTO: 9 % (ref 4–12)
NEUTROPHILS # BLD AUTO: 3.23 THOUSANDS/ΜL (ref 1.85–7.62)
NEUTS SEG NFR BLD AUTO: 58 % (ref 43–75)
NRBC BLD AUTO-RTO: 0 /100 WBCS
PLATELET # BLD AUTO: 212 THOUSANDS/UL (ref 149–390)
PMV BLD AUTO: 10.7 FL (ref 8.9–12.7)
POTASSIUM SERPL-SCNC: 4.4 MMOL/L (ref 3.5–5.3)
RBC # BLD AUTO: 3.35 MILLION/UL (ref 3.88–5.62)
SODIUM SERPL-SCNC: 138 MMOL/L (ref 135–147)
WBC # BLD AUTO: 5.53 THOUSAND/UL (ref 4.31–10.16)

## 2022-07-16 PROCEDURE — 99238 HOSP IP/OBS DSCHRG MGMT 30/<: CPT | Performed by: HOSPITALIST

## 2022-07-16 PROCEDURE — 82948 REAGENT STRIP/BLOOD GLUCOSE: CPT

## 2022-07-16 PROCEDURE — 85025 COMPLETE CBC W/AUTO DIFF WBC: CPT | Performed by: INTERNAL MEDICINE

## 2022-07-16 PROCEDURE — 80048 BASIC METABOLIC PNL TOTAL CA: CPT | Performed by: INTERNAL MEDICINE

## 2022-07-16 RX ORDER — TRAZODONE HYDROCHLORIDE 50 MG/1
25 TABLET ORAL
Qty: 30 TABLET | Refills: 0 | Status: SHIPPED | OUTPATIENT
Start: 2022-07-16

## 2022-07-16 RX ORDER — INSULIN GLARGINE 100 [IU]/ML
7 INJECTION, SOLUTION SUBCUTANEOUS
Qty: 3 ML | Refills: 0 | Status: SHIPPED | OUTPATIENT
Start: 2022-07-16

## 2022-07-16 RX ORDER — FAMOTIDINE 40 MG/1
40 TABLET, FILM COATED ORAL DAILY
Qty: 30 TABLET | Refills: 0 | Status: SHIPPED | OUTPATIENT
Start: 2022-07-16

## 2022-07-16 RX ADMIN — CLONIDINE 0.3 MG: 0.3 PATCH TRANSDERMAL at 09:36

## 2022-07-16 RX ADMIN — LABETALOL HYDROCHLORIDE 400 MG: 100 TABLET, FILM COATED ORAL at 05:02

## 2022-07-16 RX ADMIN — CINACALCET 120 MG: 30 TABLET, FILM COATED ORAL at 09:29

## 2022-07-16 RX ADMIN — CALCIUM ACETATE 2001 MG: 667 CAPSULE ORAL at 13:33

## 2022-07-16 RX ADMIN — CALCIUM ACETATE 2001 MG: 667 CAPSULE ORAL at 09:35

## 2022-07-16 RX ADMIN — Medication 2000 UNITS: at 09:30

## 2022-07-16 RX ADMIN — INSULIN LISPRO 2 UNITS: 100 INJECTION, SOLUTION INTRAVENOUS; SUBCUTANEOUS at 09:31

## 2022-07-16 RX ADMIN — B-COMPLEX W/ C & FOLIC ACID TAB 1 TABLET: TAB at 13:33

## 2022-07-16 RX ADMIN — INSULIN LISPRO 2 UNITS: 100 INJECTION, SOLUTION INTRAVENOUS; SUBCUTANEOUS at 13:30

## 2022-07-16 RX ADMIN — INSULIN LISPRO 3 UNITS: 100 INJECTION, SOLUTION INTRAVENOUS; SUBCUTANEOUS at 09:31

## 2022-07-16 RX ADMIN — LABETALOL HYDROCHLORIDE 400 MG: 100 TABLET, FILM COATED ORAL at 13:33

## 2022-07-16 RX ADMIN — ASPIRIN 81 MG: 81 TABLET, COATED ORAL at 09:31

## 2022-07-16 RX ADMIN — HEPARIN SODIUM 5000 UNITS: 5000 INJECTION INTRAVENOUS; SUBCUTANEOUS at 05:02

## 2022-07-16 RX ADMIN — LISINOPRIL 40 MG: 20 TABLET ORAL at 09:31

## 2022-07-16 RX ADMIN — INSULIN LISPRO 3 UNITS: 100 INJECTION, SOLUTION INTRAVENOUS; SUBCUTANEOUS at 13:30

## 2022-07-16 RX ADMIN — TORSEMIDE 100 MG: 100 TABLET ORAL at 09:29

## 2022-07-16 NOTE — ASSESSMENT & PLAN NOTE
Patient currently takes short-acting insulin 6 units 3 times with meals and 9 units Lantus at bedtime  Yesterday he had is denied as usual and he took 6 units short-acting with meals, 9 units at bedtime and around 1:00 a m  He noticed his blood glucose was 319 and he has taken 6 units of short-acting insulin and his glucose monitor subsequently shows rapid drop in his blood glucose levels resulting in hypoglycemia  Currently patient is asymptomatic, I will hold short-acting insulin, restart long-acting insulin and consult Endocrinology  At this time there is no clear sign of infection, he has received dose of vancomycin and ceftriaxone in the ED, will monitor off antibiotics  his blood cultures have been obtained, one tube positive for coagulase-negative Staphylococcus - thought to be contaminant    Plan:  Patient's blood sugars have returned to their pre presentation levels  Will be discharged

## 2022-07-16 NOTE — TREATMENT PLAN
Renal short note-please call the renal team back if questions or issues arise this weekend from the renal standpoint  Thank you  Please see progress note yesterday for Renal team plan  Blood pressure is noted to be elevated but improving this morning  Electrolytes stable this morning on lab work

## 2022-07-16 NOTE — ASSESSMENT & PLAN NOTE
Lab Results   Component Value Date    HGBA1C 6 7 (H) 07/15/2022       Recent Labs     07/15/22  1127 07/15/22  1609 07/15/22  2219 07/16/22  0733   POCGLU 499* 146* 204* 215*       Blood Sugar Average: Last 72 hrs:  (P) 252 [id number]     Insulin regimen as mentioned above, consult Endocrinology

## 2022-07-16 NOTE — ASSESSMENT & PLAN NOTE
Lab Results   Component Value Date    HGBA1C 6 7 (H) 07/15/2022       Recent Labs     07/15/22  1127 07/15/22  1609 07/15/22  2219 07/16/22  0733   POCGLU 499* 146* 204* 215*       Blood Sugar Average: Last 72 hrs:  (P) 096 [id number]     Monday Wednesday Friday

## 2022-07-16 NOTE — DISCHARGE INSTR - AVS FIRST PAGE
Dear Pedrito Durham,     It was our pleasure to care for you here at [facility], [place]  It is our hope that we were always able to exceed the expected standards for your care during your stay  You were hospitalized due to low blood sugar  You were cared for on the [facility] 2nd floor by Claudia Jean MD under the service of Manisha Marmolejo MD with the [facility] who covers for your primary care physician (PCP), Giovanna Barnhart DO, while you were hospitalized  If you have any questions or concerns related to this hospitalization, you may contact us at [phone]  For follow up as well as any medication refills, we recommend that you follow up with your primary care physician  A registered nurse will reach out to you by phone within a few days after your discharge to answer any additional questions that you may have after going home  However, at this time we provide for you here, the most important instructions / recommendations at discharge:     Notable Medication Adjustments -   None  Testing Required after Discharge -   None  Important follow up information -   None  Other Instructions -   Please continue to follow-up with your PCP and endocrinologist  Please review this entire after visit summary as additional general instructions including medication list, appointments, activity, diet, any pertinent wound care, and other additional recommendations from your care team that may be provided for you        Sincerely,     Claudia Jean MD,

## 2022-07-17 NOTE — PROGRESS NOTES
[facility]  Progress Note Ana Hector 1985, 43 y o  male MRN: [id number]  Unit/Bed#: S -01 Encounter: [id number]  Primary Care Provider: Shavon Radford DO   Date and time admitted to hospital: 7/15/2022  4:18 AM    * Hypoglycemia  Assessment & Plan  Patient currently takes short-acting insulin 6 units 3 times with meals and 9 units Lantus at bedtime  Yesterday he had is denied as usual and he took 6 units short-acting with meals, 9 units at bedtime and around 1:00 a m  He noticed his blood glucose was 319 and he has taken 6 units of short-acting insulin and his glucose monitor subsequently shows rapid drop in his blood glucose levels resulting in hypoglycemia  Currently patient is asymptomatic, I will hold short-acting insulin, restart long-acting insulin and consult Endocrinology  At this time there is no clear sign of infection, he has received dose of vancomycin and ceftriaxone in the ED, will monitor off antibiotics  his blood cultures have been obtained, one tube positive for coagulase-negative Staphylococcus - thought to be contaminant    Plan:  Patient's blood sugars have returned to their pre presentation levels  Will be discharged  Type 1 diabetes mellitus [facility])  Assessment & Plan  Lab Results   Component Value Date    HGBA1C 6 7 (H) 07/15/2022       Recent Labs     07/15/22  1127 07/15/22  1609 07/15/22  2219 07/16/22  0733   POCGLU 499* 146* 204* 215*       Blood Sugar Average: Last 72 hrs:  (P) 252 [id number]     Insulin regimen as mentioned above, consult Endocrinology      End stage renal disease on dialysis due to type 1 diabetes mellitus [facility])  Assessment & Plan  Lab Results   Component Value Date    HGBA1C 6 7 (H) 07/15/2022       Recent Labs     07/15/22  1127 07/15/22  1609 07/15/22  2219 07/16/22  0733   POCGLU 499* 146* 204* 215*       Blood Sugar Average: Last 72 hrs:  (P) 252 [id number]     Monday Wednesday Friday    Coronary artery disease involving native coronary artery of native heart without angina pectoris  Assessment & Plan  Continue on aspirin, beta-blocker, statin  History of lacunar cerebrovascular accident (CVA)  Assessment & Plan  Continue aspirin and statin  Multiple pulmonary nodules  Assessment & Plan  Patient does follow up with PCP    Mastoiditis of right side  Assessment & Plan  Suspected mastoiditis however patient has no clear tenderness, postauricular tenderness, discharge, fever, headache  Received vancomycin and ceftriaxone in the emergency department, unlikely acute infection, we will consult ENT for further evaluation and need for antibiotics as patient has diabetes and end-stage renal disease  For now I will monitor him off antibiotics until seen by ENT  Medical Problems             Resolved Problems  Date Reviewed: 7/16/2022   None               Discharging Resident: Kori Paez MD  Discharging Attending: No att  providers found  PCP: Cheryl Scott DO  Admission Date:   Admission Orders (From admission, onward)     Ordered        07/15/22 0730  INPATIENT ADMISSION  Once                      Discharge Date: 07/16/22    Consultations During Hospital Stay:  · Endocrinology  · ENT  · Nephrology    Procedures Performed:   · None    Significant Findings / Test Results:   · None    Incidental Findings:    CT chest abdomen pelvis wo contrast: 1  Large amount of hyperattenuating fluid within the stomach and duodenum containing more hyperdense layering fluid  Nondilated fluid filled remaining small bowel  GI bleed is not excluded in appropriate clinical and paraclinical setting , 2  No acute finding within the chest , 3   Pulmonary nodules measuring up to 7 mm are stable from chest CT 9/9/2021    Based on current Fleischner Society 2017 Guidelines on incidental pulmonary nodule, followup non-contrast CT is recommended at 3-6 months from the initial examination and, if , stable at that time, an additional followup is recommended for 18-24 months from the initial examination  Therefore, next low-dose chest CT follow-up should be 8 months from the current exam ,  , I personally discussed this study with Veit Harrell on 7/15/2022 at 6:35 AM      Test Results Pending at Discharge (will require follow up): · None     Outpatient Tests Requested:  · None    Complications:  None    Reason for Admission:  Hypoglycemia    Hospital Course: Odell Diego is a 43 y o  male patient who originally presented to the hospital on 7/15/2022 due to hypoglycemia  Patient took his normal long-acting insulin before bed and was awoken by his Dexcom alarm around 1:00 a m  Notifying him that his blood sugar was in the 300s  Patient then administered 6 units of his short-acting insulin  Shortly after his blood sugar was found to be in the 40s  His parents gave him sugary foods at home but noted weakness and lack of responsiveness which prompted them to bring him to the emergency department  In the ED, blood sugar was 76  Patient was admitted under hypoglycemia protocol  Endocrinology was consulted and per their recommendations, patient was to continue Lantus 9 units at bedtime, start Humalog 3 units with meals  Nephrology was also consulted for the patient's ESRD  Further recommendations patient received dialysis  On examination patient denied weakness, abdominal pain, lightheadedness  One of 2 tubes drawn for blood cultures in the emergency department resulted positive for coagulase negative Staphylococcus  Positive cultures was thought to be contaminated and patient was cleared for discharge  Instructions on discharge included should the patient feel feverish or ill in any way, he is to return to the hospital immediately  Please see above list of diagnoses and related plan for additional information       Condition at Discharge: good    Discharge Day Visit / Exam: Subjective:  Patient seen at bedside this morning  Appears to be in no acute distress  No acute overnight events reported  Mother present at bedside during visit  Has no complaints of abdominal pain, weakness, or lightheadedness  Denies chest pain, shortness of breath, constipation, diarrhea, or urinary complaints  He says he is feeling well and is ready to go home  Vitals: Blood Pressure: 149/85 (07/16/22 1136)  Pulse: 79 (07/16/22 1136)  Temperature: 98 6 °F (37 °C) (07/16/22 1136)  Temp Source: Tympanic (07/15/22 1730)  Respirations: 18 (07/16/22 1136)  Weight - Scale: 93 8 kg (206 lb 12 7 oz) (07/15/22 1730)  SpO2: 99 % (07/16/22 1136)  Exam:   Physical Exam  Constitutional:       General: He is not in acute distress  Appearance: Normal appearance  He is normal weight  He is not ill-appearing or toxic-appearing  HENT:      Head: Normocephalic and atraumatic  Eyes:      Extraocular Movements: Extraocular movements intact  Conjunctiva/sclera: Conjunctivae normal       Pupils: Pupils are equal, round, and reactive to light  Cardiovascular:      Rate and Rhythm: Normal rate and regular rhythm  Pulses: Normal pulses  Heart sounds: Normal heart sounds  No murmur heard  No gallop  Comments: AV fistula on left arm for hemodialysis access  Pulmonary:      Effort: Pulmonary effort is normal  No respiratory distress  Breath sounds: Normal breath sounds  No stridor  No wheezing, rhonchi or rales  Chest:      Chest wall: No tenderness  Abdominal:      General: Abdomen is flat  Bowel sounds are normal       Palpations: Abdomen is soft  Musculoskeletal:         General: Normal range of motion  Skin:     General: Skin is warm  Coloration: Skin is not jaundiced or pale  Findings: No rash  Neurological:      General: No focal deficit present  Mental Status: He is alert and oriented to person, place, and time  Mental status is at baseline        Cranial Nerves: No cranial nerve deficit  Sensory: No sensory deficit  Motor: No weakness  Psychiatric:         Mood and Affect: Mood normal          Behavior: Behavior normal           Discussion with Family: Updated  (mother) at bedside  Discharge instructions/Information to patient and family:   See after visit summary for information provided to patient and family  Provisions for Follow-Up Care:  See after visit summary for information related to follow-up care and any pertinent home health orders  Disposition:   Home    Planned Readmission:     Discharge Medications:  See after visit summary for reconciled discharge medications provided to patient and/or family        **Please Note: This note may have been constructed using a voice recognition system**

## 2022-07-18 DIAGNOSIS — E10.42 DIABETIC POLYNEUROPATHY ASSOCIATED WITH TYPE 1 DIABETES MELLITUS (HCC): ICD-10-CM

## 2022-07-18 LAB
BACTERIA BLD CULT: ABNORMAL
GRAM STN SPEC: ABNORMAL
MECA+MECC ISLT/SPM QL: DETECTED
S EPIDERMIDIS DNA BLD POS QL NAA+NON-PRB: DETECTED

## 2022-07-18 RX ORDER — GABAPENTIN 100 MG/1
CAPSULE ORAL
Qty: 60 CAPSULE | Refills: 5 | Status: SHIPPED | OUTPATIENT
Start: 2022-07-18

## 2022-07-18 NOTE — DISCHARGE SUMMARY
[facility]  Discharge Summary - Stoney Mckeon 1985, 43 y o  male MRN: [id number]  Unit/Bed#: S -01 Encounter: [id number]  Primary Care Provider: Karthik Zamora DO   Date and time admitted to hospital: 7/15/2022  4:18 AM    Hypoglycemia  Assessment & Plan  Patient currently takes short-acting insulin 6 units 3 times with meals and 9 units Lantus at bedtime  Yesterday he had is denied as usual and he took 6 units short-acting with meals, 9 units at bedtime and around 1:00 a m  He noticed his blood glucose was 319 and he has taken 6 units of short-acting insulin and his glucose monitor subsequently shows rapid drop in his blood glucose levels resulting in hypoglycemia  Currently patient is asymptomatic, I will hold short-acting insulin, restart long-acting insulin and consult Endocrinology  At this time there is no clear sign of infection, he has received dose of vancomycin and ceftriaxone in the ED, will monitor off antibiotics  his blood cultures have been obtained, one tube positive for coagulase-negative Staphylococcus - thought to be contaminant     Plan:  Patient's blood sugars have returned to their pre presentation levels  Will be discharged  Type 1 diabetes mellitus [facility])  Assessment & Plan        Lab Results   Component Value Date     HGBA1C 6 7 (H) 07/15/2022                Recent Labs     07/15/22  1127 07/15/22  1609 07/15/22  2219 07/16/22  0733   POCGLU 499* 146* 204* 215*         Blood Sugar Average: Last 72 hrs:  (P) 252 [id number]      Insulin regimen as mentioned above, consult Endocrinology       End stage renal disease on dialysis due to type 1 diabetes mellitus [facility])  Assessment & Plan        Lab Results   Component Value Date     HGBA1C 6 7 (H) 07/15/2022                Recent Labs     07/15/22  1127 07/15/22  1609 07/15/22  2219 07/16/22  0733   POCGLU 499* 146* 204* 215*         Blood Sugar Average: Last 72 hrs:  (P) 277 [id number]      Monday Wednesday Friday     Coronary artery disease involving native coronary artery of native heart without angina pectoris  Assessment & Plan  Continue on aspirin, beta-blocker, statin  History of lacunar cerebrovascular accident (CVA)  Assessment & Plan  Continue aspirin and statin  Multiple pulmonary nodules  Assessment & Plan  Patient does follow up with PCP     Mastoiditis of right side  Assessment & Plan  Suspected mastoiditis however patient has no clear tenderness, postauricular tenderness, discharge, fever, headache  Received vancomycin and ceftriaxone in the emergency department, unlikely acute infection, we will consult ENT for further evaluation and need for antibiotics as patient has diabetes and end-stage renal disease  For now I will monitor him off antibiotics until seen by ENT  Medical Problems                  Resolved Problems  Date Reviewed: 7/16/2022   None                   Discharging Resident: Anant Parkinson MD  Discharging Attending: No att  providers found  PCP: Elle Mccann DO  Admission Date:       Admission Orders (From admission, onward)              Ordered          07/15/22 0730   INPATIENT ADMISSION  Once                          Discharge Date: 07/16/22     Consultations During Hospital Stay:  · Endocrinology  · ENT  · Nephrology     Procedures Performed:   · None     Significant Findings / Test Results:   · None     Incidental Findings:    CT chest abdomen pelvis wo contrast: 1  Large amount of hyperattenuating fluid within the stomach and duodenum containing more hyperdense layering fluid  Nondilated fluid filled remaining small bowel  GI bleed is not excluded in appropriate clinical and paraclinical setting , 2  No acute finding within the chest , 3   Pulmonary nodules measuring up to 7 mm are stable from chest CT 9/9/2021    Based on current Fleischner Society 2017 Guidelines on incidental pulmonary nodule, followup non-contrast CT is recommended at 3-6 months from the initial examination and, if , stable at that time, an additional followup is recommended for 18-24 months from the initial examination  Therefore, next low-dose chest CT follow-up should be 8 months from the current exam ,  , I personally discussed this study with Zakia Vivas on 7/15/2022 at 6:35 AM        Test Results Pending at Discharge (will require follow up): · None     Outpatient Tests Requested:  · None     Complications:  None     Reason for Admission:  Hypoglycemia     Hospital Course: Price Rodrigues is a 43 y o  male patient who originally presented to the hospital on 7/15/2022 due to hypoglycemia  Patient took his normal long-acting insulin before bed and was awoken by his Dexcom alarm around 1:00 a m  Notifying him that his blood sugar was in the 300s  Patient then administered 6 units of his short-acting insulin  Shortly after his blood sugar was found to be in the 40s  His parents gave him sugary foods at home but noted weakness and lack of responsiveness which prompted them to bring him to the emergency department  In the ED, blood sugar was 76  Patient was admitted under hypoglycemia protocol  Endocrinology was consulted and per their recommendations, patient was to continue Lantus 9 units at bedtime, start Humalog 3 units with meals  Nephrology was also consulted for the patient's ESRD  Further recommendations patient received dialysis  On examination patient denied weakness, abdominal pain, lightheadedness  One of 2 tubes drawn for blood cultures in the emergency department resulted positive for coagulase negative Staphylococcus  Positive cultures was thought to be contaminated and patient was cleared for discharge  Instructions on discharge included should the patient feel feverish or ill in any way, he is to return to the hospital immediately          Please see above list of diagnoses and related plan for additional information  Condition at Discharge: good     Discharge Day Visit / Exam:   Subjective:  Patient seen at bedside this morning  Appears to be in no acute distress  No acute overnight events reported  Mother present at bedside during visit  Has no complaints of abdominal pain, weakness, or lightheadedness  Denies chest pain, shortness of breath, constipation, diarrhea, or urinary complaints  He says he is feeling well and is ready to go home  Vitals: Blood Pressure: 149/85 (07/16/22 1136)  Pulse: 79 (07/16/22 1136)  Temperature: 98 6 °F (37 °C) (07/16/22 1136)  Temp Source: Tympanic (07/15/22 1730)  Respirations: 18 (07/16/22 1136)  Weight - Scale: 93 8 kg (206 lb 12 7 oz) (07/15/22 1730)  SpO2: 99 % (07/16/22 1136)  Exam:   Physical Exam  Constitutional:       General: He is not in acute distress  Appearance: Normal appearance  He is normal weight  He is not ill-appearing or toxic-appearing  HENT:      Head: Normocephalic and atraumatic  Eyes:      Extraocular Movements: Extraocular movements intact  Conjunctiva/sclera: Conjunctivae normal       Pupils: Pupils are equal, round, and reactive to light  Cardiovascular:      Rate and Rhythm: Normal rate and regular rhythm  Pulses: Normal pulses  Heart sounds: Normal heart sounds  No murmur heard  No gallop  Comments: AV fistula on left arm for hemodialysis access  Pulmonary:      Effort: Pulmonary effort is normal  No respiratory distress  Breath sounds: Normal breath sounds  No stridor  No wheezing, rhonchi or rales  Chest:      Chest wall: No tenderness  Abdominal:      General: Abdomen is flat  Bowel sounds are normal       Palpations: Abdomen is soft  Musculoskeletal:         General: Normal range of motion  Skin:     General: Skin is warm  Coloration: Skin is not jaundiced or pale  Findings: No rash  Neurological:      General: No focal deficit present        Mental Status: He is alert and oriented to person, place, and time  Mental status is at baseline  Cranial Nerves: No cranial nerve deficit  Sensory: No sensory deficit  Motor: No weakness  Psychiatric:         Mood and Affect: Mood normal          Behavior: Behavior normal             Discussion with Family: Updated  (mother) at bedside  Discharge instructions/Information to patient and family:   See after visit summary for information provided to patient and family  Provisions for Follow-Up Care:  See after visit summary for information related to follow-up care and any pertinent home health orders  Disposition:   Home     Planned Readmission:      Discharge Medications:  See after visit summary for reconciled discharge medications provided to patient and/or family         **Please Note: This note may have been constructed using a voice recognition system**

## 2022-07-19 ENCOUNTER — TRANSITIONAL CARE MANAGEMENT (OUTPATIENT)
Dept: INTERNAL MEDICINE CLINIC | Facility: CLINIC | Age: 37
End: 2022-07-19

## 2022-07-20 LAB — BACTERIA BLD CULT: NORMAL

## 2022-07-20 NOTE — PHYSICIAN ADVISOR
Current patient class: Inpatient  The patient is currently on Hospital Day: 2 at [address]      The patient was admitted to the hospital  on 7/15/22 at  7:29 AM for the following diagnosis:  Hypoglycemia [E16 2]  Hyperglycemia [R73 9]  ESRD on hemodialysis ([address] ) [N18 6, Z99 2]  Hypothermia, initial encounter [T68  XXXA]  Mastoiditis, acute, right [H70 001]     After review of the relevant documentation, labs, vital signs and test results, this is a PROVIDER LIABLE case  In this particular case the patient was admitted to the hospital as an inpatient  The patient however failed to satisfy the 2 midnight benchmark and closer scrutiny of the case was warranted  After review of the patient presentation and relevant labs the patient was most appropriate for observation or outpatient class at the time of admission  Given that this patient has already been discharged prior to this review they become a provider liable case  Rationale is as follows: The patient is a 43 yrs   Male who presented to the ED at 7/15/2022  4:18 AM with a chief complaint of Hypoglycemia - Symptomatic (Pt arrives per EMS with report of blood glucose reading of 47 while sleeping  Pt given oral glucose by EMS and sugar came up to 71  Pt reports feeling cold and weak ) Patient admitted for hypoglycemia  Infectious etiology ruled out and he was seen by endocrinology  Pt was stable for discharge by hospital day 2  Given his stability, OBS seems appropriate and I recommend part B correction      The patients vitals on arrival were   ED Triage Vitals   Temperature Pulse Respirations Blood Pressure SpO2   07/15/22 0426 07/15/22 0422 07/15/22 0422 07/15/22 0420 07/15/22 0422   (!) 93 8 °F (34 3 °C) 64 18 153/80 100 %      Temp Source Heart Rate Source Patient Position - Orthostatic VS BP Location FiO2 (%)   07/15/22 0426 07/15/22 0422 07/15/22 0615 07/15/22 0615 --   Rectal Monitor Lying Right arm       Pain Score 07/15/22 1129       2           Past Medical History:   Diagnosis Date    Acute kidney injury ([address] )     Ambulates with cane     Anuria     Anxiety     Cellulitis of right elbow 3/31/2021    Chronic kidney disease     Depression     Diabetes mellitus ([address] )     Diarrhea     Emesis 10/24/2020    End stage renal disease ([address] ) 2/11/2018    Formatting of this note might be different from the original  Last Assessment & Plan:  Secondary to DM  On nightly PD  Followed by Nephro    Patient considering transplant for kidney and pancreas through [id number] of this note might be different from the original  Last Assessment & Plan:  Formatting of this note might be different from the original  Lab Results  Component Value Date   EGFR     Falls     Gastroparesis     GERD (gastroesophageal reflux disease)     History of shingles 2010    History of transfusion 02/2018    no adverse reaction    Hyperlipidemia     Hyperphosphatemia     Hypertension     Itching     Muscle weakness     general unsteadiness    Obesity (BMI 30 0-34 9) 9/9/2019    PONV (postoperative nausea and vomiting) 1/26/2018    Protein-calorie malnutrition ([address] ) 11/23/2020    Recurrent peritonitis ([address] ) due to peritoneal dialysis catheter 7/31/2020    Retinopathy     Seizures ([address] )     early 2020 - one time    Skin abnormality     some dime size areas where skin was scratched from itching    Spontaneous bacterial peritonitis ([address] ) 10/19/2020    Stroke [address] )     x2 - off balance/no driving/fatigue    Swelling of both lower extremities     Vomiting     Wears glasses      Past Surgical History:   Procedure Laterality Date    CARDIAC LOOP RECORDER  05/2018    COLONOSCOPY      EGD      EYE SURGERY Right     IR AV FISTULAGRAM/GRAFTOGRAM  2/23/2021    IR TUNNELED CENTRAL LINE PLACEMENT  2/16/2021    IR TUNNELED DIALYSIS CATHETER PLACEMENT  11/18/2020    IR TUNNELED DIALYSIS CATHETER REMOVAL  2/12/2021    IR TUNNELED DIALYSIS CATHETER REMOVAL  3/11/2021    PERITONEAL CATHETER INSERTION N/A 8/27/2018    Procedure: UNROOF PD CATHETER;  Surgeon: Brian Mendoza DO;  [facility];  Service: General    FL ANASTOMOSIS,AV,ANY SITE Left 11/9/2020    Procedure: CREATION FISTULA  ARTERIOVENOUS (AV) - LEFT WRIST;  Surgeon: Bob Hendrickson MD;  [facility];  Service: Vascular    FL ESOPHAGOGASTRODUODENOSCOPY TRANSORAL DIAGNOSTIC N/A 4/18/2019    Procedure: ESOPHAGOGASTRODUODENOSCOPY (EGD); Surgeon: Merline Ignacio MD;  Location: [facility]; Service: Gastroenterology    FL LAP INSERTION TUNNELED INTRAPERITONEAL CATHETER N/A 8/6/2018    Procedure: LAPAROSCOPIC PD CATHETER PLACEMENT;  Surgeon: Brian Mendoza DO;  [facility];  Service: General    FL REMOVAL TUNNELED INTRAPERITONEAL CATHETER N/A 11/18/2020    Procedure: REMOVAL CATHETER PERITONEAL DIALYSIS;  Surgeon: Dianelys Banda MD;  [facility];  Service: General    TONSILLECTOMY      UPPER GASTROINTESTINAL ENDOSCOPY             Consults have been placed to:   IP CONSULT TO NEPHROLOGY  IP CONSULT TO ENDOCRINOLOGY  IP CONSULT TO ENT    Vitals:    07/16/22 0319 07/16/22 0503 07/16/22 0733 07/16/22 1136   BP: (!) 176/94 161/95 162/94 149/85   Pulse: 72 71 66 79   Resp:   16 18   Temp: 98 2 °F (36 8 °C)  98 3 °F (36 8 °C) 98 6 °F (37 °C)   TempSrc:       SpO2: 99% 99% 100% 99%   Weight:           Most recent labs:    No results for input(s): WBC, HGB, HCT, PLT, K, NA, CALCIUM, BUN, CREATININE, LIPASE, AMYLASE, INR, TROPONINI, CKTOTAL, AST, ALT, ALKPHOS, BILITOT in the last 72 hours  Scheduled Meds:  Continuous Infusions:No current facility-administered medications for this encounter  PRN Meds:      Surgical procedures (if appropriate):

## 2022-07-21 ENCOUNTER — OFFICE VISIT (OUTPATIENT)
Dept: INTERNAL MEDICINE CLINIC | Facility: CLINIC | Age: 37
End: 2022-07-21
Payer: MEDICARE

## 2022-07-21 VITALS
TEMPERATURE: 99.2 F | BODY MASS INDEX: 28.98 KG/M2 | DIASTOLIC BLOOD PRESSURE: 86 MMHG | HEIGHT: 71 IN | SYSTOLIC BLOOD PRESSURE: 130 MMHG | WEIGHT: 207 LBS | OXYGEN SATURATION: 98 % | HEART RATE: 75 BPM

## 2022-07-21 DIAGNOSIS — L60.1 TRAUMATIC ONYCHOLYSIS: ICD-10-CM

## 2022-07-21 DIAGNOSIS — N18.6 TYPE 1 DIABETES MELLITUS WITH CHRONIC KIDNEY DISEASE ON CHRONIC DIALYSIS (HCC): ICD-10-CM

## 2022-07-21 DIAGNOSIS — Z99.2 TYPE 1 DIABETES MELLITUS WITH CHRONIC KIDNEY DISEASE ON CHRONIC DIALYSIS (HCC): ICD-10-CM

## 2022-07-21 DIAGNOSIS — R91.8 MULTIPLE PULMONARY NODULES: ICD-10-CM

## 2022-07-21 DIAGNOSIS — G47.33 OSA (OBSTRUCTIVE SLEEP APNEA): ICD-10-CM

## 2022-07-21 DIAGNOSIS — E10.22 TYPE 1 DIABETES MELLITUS WITH CHRONIC KIDNEY DISEASE ON CHRONIC DIALYSIS (HCC): ICD-10-CM

## 2022-07-21 DIAGNOSIS — E16.2 HYPOGLYCEMIA: Primary | ICD-10-CM

## 2022-07-21 PROBLEM — H70.91 MASTOIDITIS OF RIGHT SIDE: Status: RESOLVED | Noted: 2022-07-15 | Resolved: 2022-07-21

## 2022-07-21 PROCEDURE — 99495 TRANSJ CARE MGMT MOD F2F 14D: CPT | Performed by: INTERNAL MEDICINE

## 2022-07-21 NOTE — ASSESSMENT & PLAN NOTE
-episode of hypoglycemia with symptoms  -medication adjustments during his hospitalization    He has self adjusted since discharge, taking Humalog 3-4units TID and Lantus 8units qhs  -follow up with [facility]

## 2022-07-21 NOTE — PROGRESS NOTES
Assessment/Plan:    Problem List Items Addressed This Visit        Endocrine    Type 1 diabetes mellitus ([facility] [address] )    Hypoglycemia - Primary     -episode of hypoglycemia with symptoms  -medication adjustments during his hospitalization  He has self adjusted since discharge, taking Humalog 3-4units TID and Lantus 8units qhs  -follow up with [facility]            Respiratory    RACHEAL (obstructive sleep apnea)     -found to have severe sleep apnea  -has upcoming appt with Sleep medicine for treatment            Musculoskeletal and Integument    Traumatic onycholysis     -R big toe, s/p nailplate resection  -nailbed appears well without sign of infection  -follow up with Podiatry            Other    Multiple pulmonary nodules     -overall stable  -due for surveillance CT chest 3/2023               Subjective:      Patient ID: Massimo Jimenez is a 43 y o  male  HPI  TCM Call     Date and time call was made  7/19/2022 12:30 PM    Hospital care reviewed  Records reviewed    Patient was hospitialized at  [address] [place] [address]     Date of Admission  03/31/21    Date of discharge  04/04/21    Diagnosis  Right Elbow Pain    Disposition  Home    Current Symptoms  None      TCM Call     Post hospital issues  None    Should patient be enrolled in anticoag monitoring? No    Scheduled for follow up?   Yes    Patients specialists  Endocrinologist    Did you obtain your prescribed medications  Yes    Do you need help managing your prescriptions or medications  No    Is transportation to your appointment needed  No    I have advised the patient to call PCP with any new or worsening symptoms  manish chan    Living Arrangements  Family members    Support System  Family    The type of support provided  Emotional; Physical    Do you have social support  Yes, as much as I need    Are you recieving any outpatient services  No    Are you recieving home care services  No    Are you using any community resources  No    Current waiver services  No    Have you fallen in the last 12 months  No    Interperter language line needed  No    Counseling  Patient    Comments  Patient appointment shceduled for 4/15/21        37yo male with DM1 with neuropathy, gastroparesis, ESRD on HD, hypothyroidism, MDD, ELIAS, pulm nodules, nonobstructive CAD, renovacular HTN with h/o CVA, anemia, vitamin D def here for OZ  He was hospitalized at [facility] 7/15-7/16/22 due to hypoglycemia  Had blood sugar of 300s then administered additional short acting insulin 6units that dropped his blood sugars in he 40s  Despite administration of food, he was weak and had decreased responsiveness  In ED blood sugar was 76  Endo adjusted his regimen to Lantus 9units qhs and to start Humalog 3units TID  Hospitalization course also noted contaminant blood culture  CT chest showed stable pulm nodules measuring up to 7mm  He has a record of when he takes his insulin  At 1am took 6units for short acting for BS of 300s  He fell asleep but woke when his sugars decreased  Has gastroparesis  He has adjusted his Lantus, now currently at 8units  He has not had hypoglycemic symptoms since, has had high sugars though  Had PSG that showed severe RACHEAL  He has not been aware that he snores  He is oliguric with ESRD  Has fatigue  Denies morning HA  Has episodic daytime sleepiness  Bruise on big toe  Went to podiatrist   R big toenail was ripped off  He has been washing with tub water every other day and applying neosporin      The following portions of the patient's history were reviewed and updated as appropriate: allergies, current medications, past family history, past medical history, past social history, past surgical history and problem list       Current Outpatient Medications:     aspirin (ECOTRIN LOW STRENGTH) 81 mg EC tablet, Take 81 mg by mouth daily, Disp: , Rfl:     atorvastatin (LIPITOR) 40 mg tablet, Take 1 tablet (40 mg total) by mouth every evening, Disp: 90 tablet, Rfl: 1    b complex vitamins capsule, Take 1 capsule by mouth daily before lunch , Disp: , Rfl:     B-D ULTRAFINE III SHORT PEN 31G X 8 MM MISC, USE 1 PEN NEEDLE 8 TIMES DAILY, Disp: 100 each, Rfl: 47    calcium acetate (PHOSLO) capsule, TAKE 3 CAPSULES BY MOUTH WITH MEALS, Disp: , Rfl:     cholecalciferol (VITAMIN D3) 1,000 units tablet, Take 2,000 Units by mouth daily  , Disp: , Rfl:     cinacalcet (SENSIPAR) 60 MG tablet, Take 120 mg by mouth daily 2 tab daily , Disp: , Rfl:     cloNIDine (CATAPRES-TTS-3) 0 3 mg/24 hr, 1 patch once a week , Disp: , Rfl:     doxazosin (CARDURA) 2 mg tablet, TAKE 2 TABLETS BY MOUTH IN THE MORNING AND 2 IN THE EVENING, Disp: 120 tablet, Rfl: 0    escitalopram (LEXAPRO) 20 mg tablet, Take 1 tablet (20 mg total) by mouth daily, Disp: 90 tablet, Rfl: 2    famotidine (PEPCID) 40 MG tablet, Take 1 tablet (40 mg total) by mouth daily, Disp: 30 tablet, Rfl: 0    gabapentin (NEURONTIN) 100 mg capsule, Take 2 tablets at bedtime, Disp: 60 capsule, Rfl: 5    GLUCAGON EMERGENCY 1 MG injection, INJECT 1MG SUBCUTANEOUSLY AS NEEDED (AS DIRECTED)   MAY REPEAT DOSE EVERY 20 MINUTES AS NEEDED, Disp: , Rfl: 3    hydrOXYzine HCL (ATARAX) 10 mg tablet, Take 1 tablet (10 mg total) by mouth every 6 (six) hours as needed for itching, Disp: 30 tablet, Rfl: 0    insulin aspart (NovoLOG) 100 units/mL injection, Inject 25 Units under the skin Sliding scale, Disp: , Rfl:     insulin glargine (Basaglar KwikPen) 100 units/mL injection pen, Inject 7 Units under the skin daily at bedtime, Disp: 3 mL, Rfl: 0    labetalol (NORMODYNE) 200 mg tablet, TAKE 2 TABLETS BY MOUTH THREE TIMES DAILY, Disp: 180 tablet, Rfl: 0    levothyroxine 25 mcg tablet, Take 1 tablet (25 mcg total) by mouth daily, Disp: 30 tablet, Rfl: 1    lisinopril (ZESTRIL) 40 mg tablet, Take 80 mg by mouth daily, Disp: , Rfl:     metolazone (ZAROXOLYN) 10 mg tablet, Take 5 mg by mouth daily  , Disp: , Rfl:     minoxidil (LONITEN) 2 5 mg tablet, Take 2 5 mg by mouth 2 (two) times a day , Disp: , Rfl:     NIFEdipine ER (ADALAT CC) 60 MG 24 hr tablet, Take 1 tablet (60 mg total) by mouth 2 (two) times a day, Disp: 180 tablet, Rfl: 0    ofloxacin (FLOXIN) 0 3 % otic solution, Administer 10 drops to the right ear daily, Disp: 5 mL, Rfl: 0    ondansetron (ZOFRAN) 4 mg tablet, Take 1 tablet (4 mg total) by mouth every 8 (eight) hours as needed for nausea or vomiting, Disp: 90 tablet, Rfl: 0    Patiromer Sorbitex Calcium (VELTASSA PO), Take 5 g by mouth once a week, Disp: , Rfl:     torsemide (DEMADEX) 100 mg tablet, Take 100 mg by mouth daily  , Disp: , Rfl:     traZODone (DESYREL) 50 mg tablet, Take 0 5 tablets (25 mg total) by mouth daily at bedtime as needed for sleep, Disp: 30 tablet, Rfl: 0    Review of Systems   Constitutional: Positive for fatigue  Negative for activity change, appetite change and unexpected weight change  Respiratory: Negative for cough, shortness of breath and wheezing  Cardiovascular: Negative for chest pain, palpitations and leg swelling  Gastrointestinal: Negative for abdominal pain, constipation, diarrhea and nausea  Genitourinary:        Oliguric   Musculoskeletal: Positive for arthralgias  Skin: Positive for color change and wound  Negative for pallor and rash  Neurological: Positive for dizziness (chronic) and numbness  Negative for headaches  Psychiatric/Behavioral: Negative for sleep disturbance  Objective:    /86 (BP Location: Left arm, Patient Position: Sitting)   Pulse 75   Temp 99 2 °F (37 3 °C) (Tympanic)   Ht 5' 11" (1 803 m)   Wt 93 9 kg (207 lb)   SpO2 98%   BMI 28 87 kg/m²      Physical Exam  Vitals reviewed  Constitutional:       General: He is not in acute distress  Appearance: He is not diaphoretic  HENT:      Head: Normocephalic and atraumatic     Cardiovascular:      Rate and Rhythm: Normal rate and regular rhythm  Pulses:           Posterior tibial pulses are 1+ on the right side and 1+ on the left side  Heart sounds: Normal heart sounds  Pulmonary:      Effort: Pulmonary effort is normal  No respiratory distress  Breath sounds: Normal breath sounds  No wheezing  Musculoskeletal:      Right lower leg: No edema  Left lower leg: No edema  Skin:     Comments: Absent R big toenail, nontender, nonerythematous nailbed   Neurological:      Mental Status: He is alert and oriented to person, place, and time  Psychiatric:         Attention and Perception: Attention normal          Mood and Affect: Mood is anxious  Speech: Speech normal          Behavior: Behavior is cooperative

## 2022-07-21 NOTE — ASSESSMENT & PLAN NOTE
-R big toe, s/p nailplate resection  -nailbed appears well without sign of infection  -follow up with Podiatry

## 2022-07-26 LAB
DME PARACHUTE DELIVERY DATE ACTUAL: NORMAL
DME PARACHUTE DELIVERY DATE EXPECTED: NORMAL
DME PARACHUTE DELIVERY DATE REQUESTED: NORMAL
DME PARACHUTE DELIVERY NOTE: NORMAL
DME PARACHUTE ITEM DESCRIPTION: NORMAL
DME PARACHUTE ORDER STATUS: NORMAL
DME PARACHUTE SUPPLIER NAME: NORMAL
DME PARACHUTE SUPPLIER PHONE: NORMAL

## 2022-08-05 ENCOUNTER — HOSPITAL ENCOUNTER (EMERGENCY)
Facility: HOSPITAL | Age: 37
Discharge: HOME | End: 2022-08-05
Attending: EMERGENCY MEDICINE | Admitting: EMERGENCY MEDICINE
Payer: MEDICARE

## 2022-08-05 VITALS
RESPIRATION RATE: 16 BRPM | BODY MASS INDEX: 28.96 KG/M2 | HEART RATE: 64 BPM | OXYGEN SATURATION: 100 % | DIASTOLIC BLOOD PRESSURE: 89 MMHG | TEMPERATURE: 97.6 F | WEIGHT: 207.67 LBS | SYSTOLIC BLOOD PRESSURE: 182 MMHG

## 2022-08-05 DIAGNOSIS — E16.2 HYPOGLYCEMIA: Primary | ICD-10-CM

## 2022-08-05 LAB — GLUCOSE SERPL-MCNC: 124 MG/DL (ref 65–140)

## 2022-08-05 PROCEDURE — 82948 REAGENT STRIP/BLOOD GLUCOSE: CPT

## 2022-08-05 PROCEDURE — 99284 EMERGENCY DEPT VISIT MOD MDM: CPT | Performed by: EMERGENCY MEDICINE

## 2022-08-05 PROCEDURE — 99283 EMERGENCY DEPT VISIT LOW MDM: CPT

## 2022-08-05 RX ORDER — NIFEDIPINE 30 MG/1
60 TABLET, EXTENDED RELEASE ORAL ONCE
Status: COMPLETED | OUTPATIENT
Start: 2022-08-05 | End: 2022-08-05

## 2022-08-05 RX ORDER — LABETALOL 100 MG/1
200 TABLET, FILM COATED ORAL ONCE
Status: COMPLETED | OUTPATIENT
Start: 2022-08-05 | End: 2022-08-05

## 2022-08-05 RX ADMIN — LABETALOL HYDROCHLORIDE 200 MG: 100 TABLET, FILM COATED ORAL at 22:12

## 2022-08-05 RX ADMIN — NIFEDIPINE 60 MG: 30 TABLET, FILM COATED, EXTENDED RELEASE ORAL at 22:35

## 2022-08-06 LAB — GLUCOSE SERPL-MCNC: 164 MG/DL (ref 65–140)

## 2022-08-06 NOTE — ED PROVIDER NOTES
History  Chief Complaint   Patient presents with    Hypoglycemia - no symptoms     Hypoglycemia 40s at home  Patient is a 47-year-old male with a past medical history of anxiety, CKD with end-stage renal disease, depression, diabetes, gastroparesis, GERD, hyperlipidemia, hypertension, and stroke presents to the ED for hypoglycemia  Patient's blood glucose was in the 40s at home on his glucose monitor and that is why he came into the hospital   At home, patient did take about 4-5 glucose tablets  Patient states that he was at dialysis today and after dialysis, he had a spike in his glucose for which he took 10 units of insulin  About 9:00 p m  tonight, patient experienced some vomiting and right after the vomiting episode his glucose was in the 40s  Patient was recently in the hospital about 2 weeks ago for hyperglycemia after dialysis as well  Patient denies any fevers, chills, constipation, diarrhea, numbness, weakness, and tingling  History provided by:  Patient  Hypoglycemia - no symptoms  Initial blood sugar:  40s  Blood sugar after intervention:  124  Severity:  Mild  Onset quality:  Sudden  Associated symptoms: vomiting (Hypoglycemia after the vomiting )    Risk factors: kidney disease (Patient had dialysis in the morning today )        Prior to Admission Medications   Prescriptions Last Dose Informant Patient Reported? Taking? B-D ULTRAFINE III SHORT PEN 31G X 8 MM MISC  Self No No   Sig: USE 1 PEN NEEDLE 8 TIMES DAILY   GLUCAGON EMERGENCY 1 MG injection  Self Yes No   Sig: INJECT 1MG SUBCUTANEOUSLY AS NEEDED (AS DIRECTED)   MAY REPEAT DOSE EVERY 20 MINUTES AS NEEDED   NIFEdipine ER (ADALAT CC) 60 MG 24 hr tablet  Self No No   Sig: Take 1 tablet (60 mg total) by mouth 2 (two) times a day   Patiromer Sorbitex Calcium (VELTASSA PO)  Self Yes No   Sig: Take 5 g by mouth once a week   aspirin (ECOTRIN LOW STRENGTH) 81 mg EC tablet  Self Yes No   Sig: Take 81 mg by mouth daily   atorvastatin (LIPITOR) 40 mg tablet  Self No No   Sig: Take 1 tablet (40 mg total) by mouth every evening   b complex vitamins capsule  Self Yes No   Sig: Take 1 capsule by mouth daily before lunch    calcium acetate (PHOSLO) capsule  Self Yes No   Sig: TAKE 3 CAPSULES BY MOUTH WITH MEALS   cholecalciferol (VITAMIN D3) 1,000 units tablet  Self Yes No   Sig: Take 2,000 Units by mouth daily     cinacalcet (SENSIPAR) 60 MG tablet  Self Yes No   Sig: Take 120 mg by mouth daily 2 tab daily    cloNIDine (CATAPRES-TTS-3) 0 3 mg/24 hr  Self Yes No   Si patch once a week    doxazosin (CARDURA) 2 mg tablet  Self No No   Sig: TAKE 2 TABLETS BY MOUTH IN THE MORNING AND 2 IN THE EVENING   escitalopram (LEXAPRO) 20 mg tablet  Self No No   Sig: Take 1 tablet (20 mg total) by mouth daily   famotidine (PEPCID) 40 MG tablet  Self No No   Sig: Take 1 tablet (40 mg total) by mouth daily   gabapentin (NEURONTIN) 100 mg capsule  Self No No   Sig: Take 2 tablets at bedtime   hydrOXYzine HCL (ATARAX) 10 mg tablet  Self No No   Sig: Take 1 tablet (10 mg total) by mouth every 6 (six) hours as needed for itching   insulin aspart (NovoLOG) 100 units/mL injection  Self Yes No   Sig: Inject 25 Units under the skin Sliding scale   insulin glargine (Basaglar KwikPen) 100 units/mL injection pen  Self No No   Sig: Inject 7 Units under the skin daily at bedtime   labetalol (NORMODYNE) 200 mg tablet  Self No No   Sig: TAKE 2 TABLETS BY MOUTH THREE TIMES DAILY   levothyroxine 25 mcg tablet  Self No No   Sig: Take 1 tablet (25 mcg total) by mouth daily   lisinopril (ZESTRIL) 40 mg tablet  Self Yes No   Sig: Take 80 mg by mouth daily   metolazone (ZAROXOLYN) 10 mg tablet  Self Yes No   Sig: Take 5 mg by mouth daily     minoxidil (LONITEN) 2 5 mg tablet  Self Yes No   Sig: Take 2 5 mg by mouth 2 (two) times a day    ofloxacin (FLOXIN) 0 3 % otic solution  Self No No   Sig: Administer 10 drops to the right ear daily   ondansetron (ZOFRAN) 4 mg tablet  Self No No Sig: Take 1 tablet (4 mg total) by mouth every 8 (eight) hours as needed for nausea or vomiting   torsemide (DEMADEX) 100 mg tablet  Self Yes No   Sig: Take 100 mg by mouth daily     traZODone (DESYREL) 50 mg tablet  Self No No   Sig: Take 0 5 tablets (25 mg total) by mouth daily at bedtime as needed for sleep      Facility-Administered Medications: None       Past Medical History:   Diagnosis Date    [facility]     Ambulates with cane     Anuria     Anxiety     Cellulitis of right elbow 3/31/2021    Chronic kidney disease     Depression     Diabetes mellitus ([facility] [address] )     Diarrhea     Emesis 10/24/2020    End stage renal disease ([facility] ) 2/11/2018    Formatting of this note might be different from the original  Last Assessment & Plan:  Secondary to DM  On nightly PD  Followed by Nephro    Patient considering transplant for kidney and pancreas through [id number] of this note might be different from the original  Last Assessment & Plan:  Formatting of this note might be different from the original  Lab Results  Component Value Date   EGFR     Falls     Gastroparesis     GERD (gastroesophageal reflux disease)     History of shingles 2010    History of transfusion 02/2018    no adverse reaction    Hyperlipidemia     Hyperphosphatemia     Hypertension     Itching     Mastoiditis of right side 7/15/2022    Muscle weakness     general unsteadiness    Obesity (BMI 30 0-34 9) 9/9/2019    PONV (postoperative nausea and vomiting) 1/26/2018    Protein-calorie malnutrition ([facility] [address] ) 11/23/2020    Recurrent peritonitis ([facility] [address] ) due to peritoneal dialysis catheter 7/31/2020    Retinopathy     Seizures ([address] )     early 2020 - one time    Skin abnormality     some dime size areas where skin was scratched from itching    Spontaneous bacterial peritonitis ([address] ) 10/19/2020    Stroke ([facility] [address] )     x2 - off balance/no driving/fatigue    Swelling of both lower extremities     Vomiting     Wears glasses        Past Surgical History:   Procedure Laterality Date    CARDIAC LOOP RECORDER  05/2018    COLONOSCOPY      EGD      EYE SURGERY Right     IR AV FISTULAGRAM/GRAFTOGRAM  2/23/2021    IR TUNNELED CENTRAL LINE PLACEMENT  2/16/2021    IR TUNNELED DIALYSIS CATHETER PLACEMENT  11/18/2020    IR TUNNELED DIALYSIS CATHETER REMOVAL  2/12/2021    IR TUNNELED DIALYSIS CATHETER REMOVAL  3/11/2021    PERITONEAL CATHETER INSERTION N/A 8/27/2018    Procedure: UNROOF PD CATHETER;  Surgeon: Andres Miller DO;  [facility];  Service: General    VT ANASTOMOSIS,AV,ANY SITE Left 11/9/2020    Procedure: CREATION FISTULA  ARTERIOVENOUS (AV) - LEFT WRIST;  Surgeon: Christelle Michael MD;  [facility];  Service: Vascular    VT ESOPHAGOGASTRODUODENOSCOPY TRANSORAL DIAGNOSTIC N/A 4/18/2019    Procedure: ESOPHAGOGASTRODUODENOSCOPY (EGD); Surgeon: Fidelina Lopez MD;  Location: [facility]; Service: Gastroenterology    VT LAP INSERTION TUNNELED INTRAPERITONEAL CATHETER N/A 8/6/2018    Procedure: LAPAROSCOPIC PD CATHETER PLACEMENT;  Surgeon: Andres Miller DO;  [facility];  Service: General    VT REMOVAL TUNNELED INTRAPERITONEAL CATHETER N/A 11/18/2020    Procedure: REMOVAL CATHETER PERITONEAL DIALYSIS;  Surgeon: Diane Saba MD;  [facility];  Service: General    TONSILLECTOMY      UPPER GASTROINTESTINAL ENDOSCOPY         Family History   Problem Relation Age of Onset    Breast cancer Mother     Hypertension Mother     Hyperlipidemia Father     Hypertension Father     Leukemia Maternal Grandmother     Hyperlipidemia Maternal Grandfather     Hypertension Maternal Grandfather     Hyperlipidemia Paternal Grandmother     Hypertension Paternal Grandmother     Heart disease Paternal Grandfather         cardiac disorder    Diabetes Paternal Grandfather      I have reviewed and agree with the history as documented      E-Cigarette/Vaping    E-Cigarette Use Current Every Day User  Comments medical marijuana      E-Cigarette/Vaping Substances    Nicotine No     THC Yes     CBD Yes     Flavoring No     Other No     Unknown No      Social History     Tobacco Use    Smoking status: Former Smoker     Packs/day: 0 50     Years: 12 00     Pack years: 6 00     Types: Cigarettes     Quit date: 2018     Years since quittin 5    Smokeless tobacco: Never Used    Tobacco comment: quit 2018   Vaping Use    Vaping Use: Every day    Substances: THC, CBD   Substance Use Topics    Alcohol use: Yes     Comment: occassionally    Drug use: Yes     Types: Marijuana     Comment: medical marijuana        Review of Systems   Constitutional: Negative  HENT: Negative  Eyes: Negative  Respiratory: Negative  Cardiovascular: Negative  Gastrointestinal: Positive for vomiting (Hypoglycemia after the vomiting )  Endocrine: Negative  Genitourinary: Negative  Musculoskeletal: Negative  Skin: Negative  Allergic/Immunologic: Negative  Neurological: Negative  Hematological: Negative  Psychiatric/Behavioral: Negative  Physical Exam  ED Triage Vitals   Temperature Pulse Respirations Blood Pressure SpO2   22   97 6 °F (36 4 °C) 63 18 (!) 216/100 100 %      Temp Source Heart Rate Source Patient Position - Orthostatic VS BP Location FiO2 (%)   22 --   Oral Monitor Sitting Right arm       Pain Score       --                    Orthostatic Vital Signs  Vitals:    220   BP: (!) 216/100 (!) 216/100 (!) 182/89   Pulse: 63 66 64   Patient Position - Orthostatic VS:  Sitting        Physical Exam  Vitals and nursing note reviewed  Constitutional:       Appearance: Normal appearance  HENT:      Head: Normocephalic and atraumatic        Mouth/Throat:      Mouth: Mucous membranes are moist    Eyes:      Extraocular Movements: Extraocular movements intact  Conjunctiva/sclera: Conjunctivae normal       Pupils: Pupils are equal, round, and reactive to light  Cardiovascular:      Rate and Rhythm: Normal rate and regular rhythm  Pulmonary:      Effort: Pulmonary effort is normal       Breath sounds: Normal breath sounds  Abdominal:      General: Abdomen is flat  Palpations: Abdomen is soft  Skin:     General: Skin is warm  Neurological:      Mental Status: He is alert and oriented to person, place, and time  Mental status is at baseline  Psychiatric:         Mood and Affect: Mood normal          Behavior: Behavior normal          Thought Content: Thought content normal          Judgment: Judgment normal          ED Medications  Medications   NIFEdipine (PROCARDIA XL) 24 hr tablet 60 mg (has no administration in time range)   labetalol (NORMODYNE) tablet 200 mg (200 mg Oral Given 8/5/22 2212)       Diagnostic Studies  Results Reviewed     Procedure Component Value Units Date/Time    Fingerstick Glucose (POCT) [[id number]]  (Normal) Collected: 08/05/22 2141    Lab Status: Final result Updated: 08/05/22 2143     POC Glucose 124 mg/dl                  No orders to display         Procedures  Procedures      ED Course                             SBIRT 22yo+    Flowsheet Row Most Recent Value   SBIRT (25 yo +)    In order to provide better care to our patients, we are screening all of our patients for alcohol and drug use  Would it be okay to ask you these screening questions? Unable to answer at this time Filed at: 08/05/2022 2140                MDM  Number of Diagnoses or Management Options  Diagnosis management comments: Patient is 23-year-old male who presents to the hyperglycemia  In the ED, POC glucose was 124  Patient with dialysis today in the morning  About 2 weeks ago, the patient was in the hospital due to same reason    The most probable cause of patient's hypoglycemia once again is the dialysis this morning  Patient was observed in the ED for 2 hours  Patient will be discharged home  Patient advised to come back to the ED if symptoms worsen or onset of new symptoms  Disposition  Final diagnoses:   None     ED Disposition     None      Follow-up Information    None         Patient's Medications   Discharge Prescriptions    No medications on file     No discharge procedures on file  PDMP Review       Value Time User    PDMP Reviewed  Yes 7/15/2022  4:20 AM Kacy Parrish MD           ED Provider  Attending physically available and evaluated Haylee Proper  I managed the patient along with the ED Attending      Electronically Signed by         Sudha Cox DO  08/05/22 1609

## 2022-08-06 NOTE — ED ATTENDING ATTESTATION
8/5/2022  IRickey DO, saw and evaluated the patient  I have discussed the patient with the resident/non-physician practitioner and agree with the resident's/non-physician practitioner's findings, Plan of Care, and MDM as documented in the resident's/non-physician practitioner's note, except where noted  All available labs and Radiology studies were reviewed  I was present for key portions of any procedure(s) performed by the resident/non-physician practitioner and I was immediately available to provide assistance  At this point I agree with the current assessment done in the Emergency Department  I have conducted an independent evaluation of this patient a history and physical is as follows:      25-year-old male, end-stage renal disease on dialysis, presents after a hypoglycemic episode, gave himself 10 extra units of insulin today as blood sugar was high but this dropped too low, took oral glucose tablets which increases blood sugar, does now in the 200s but may call 911 to be evaluated currently has no complaints  , feels at baseline  , patient requesting to be discharged  Review of Systems   Constitutional:  Positive for activity change, negative for chills, diaphoresis and fever  HENT: Negative for congestion, sinus pressure and sore throat  Eyes: Negative for pain and visual disturbance  Respiratory: Negative for cough, chest tightness, shortness of breath, wheezing and stridor  Cardiovascular: Negative for chest pain and palpitations  Gastrointestinal: Negative for abdominal distention, abdominal pain, constipation, diarrhea, nausea and vomiting  Genitourinary: Negative for dysuria and frequency  Musculoskeletal: Negative for neck pain and neck stiffness  Skin: Negative for rash  Neurological:  Positive for dizziness, negative for speech difficulty, light-headedness, numbness and headaches                   ED Course         Critical Care Time  Procedures        No orders to display         Labs Reviewed   POCT GLUCOSE - Normal       Result Value Ref Range Status    POC Glucose 124  65 - 140 mg/dl Final     MDM  Number of Diagnoses or Management Options  Hypoglycemia: new, needed workup  Diagnosis management comments:       Hypoglycemic episode monitor in ER currently blood sugar in the 200s, alerts of recurrent hypoglycemia was sent to him and his mother, requesting to be discharged, agree with the discharge plan       Amount and/or Complexity of Data Reviewed  Clinical lab tests: ordered and reviewed  Decide to obtain previous medical records or to obtain history from someone other than the patient: yes  Obtain history from someone other than the patient: yes  Review and summarize past medical records: yes         Time reflects when diagnosis was documented in both MDM as applicable and the Disposition within this note     Time User Action Codes Description Comment    8/5/2022 10:31 PM Bari Veloz Add [E16 2] Hypoglycemia       ED Disposition     ED Disposition   Discharge    Condition   Stable    Date/Time   Fri Aug 5, 2022 10:31 PM    Comment   Mag Mckeon discharge to home/self care                 Follow-up Information     Follow up With Specialties Details Why Contact Info Additional Information    Hayde Lopez, DO Internal Medicine Schedule an appointment as soon as possible for a visit in 2 weeks  [address] [facility] [address] [place] [address] [address] [facility]  [phone]       [facility] Emergency Department Emergency Medicine Go to  If symptoms worsen [address] Emergency Department, [address], [place], [place], [address]

## 2022-08-25 ENCOUNTER — OFFICE VISIT (OUTPATIENT)
Dept: PODIATRY | Facility: CLINIC | Age: 37
End: 2022-08-25
Payer: MEDICARE

## 2022-08-25 VITALS
HEART RATE: 78 BPM | HEIGHT: 71 IN | WEIGHT: 208.6 LBS | SYSTOLIC BLOOD PRESSURE: 164 MMHG | DIASTOLIC BLOOD PRESSURE: 105 MMHG | BODY MASS INDEX: 29.2 KG/M2

## 2022-08-25 DIAGNOSIS — E10.42 DIABETIC POLYNEUROPATHY ASSOCIATED WITH TYPE 1 DIABETES MELLITUS (HCC): Primary | ICD-10-CM

## 2022-08-25 PROCEDURE — 99203 OFFICE O/P NEW LOW 30 MIN: CPT | Performed by: PODIATRIST

## 2022-08-25 RX ORDER — GLUCAGON INJECTION, SOLUTION 1 MG/.2ML
INJECTION, SOLUTION SUBCUTANEOUS
COMMUNITY
Start: 2022-08-10

## 2022-08-25 RX ORDER — INSULIN ASPART 100 [IU]/ML
INJECTION, SOLUTION INTRAVENOUS; SUBCUTANEOUS
COMMUNITY
Start: 2022-08-16

## 2022-08-25 NOTE — PROGRESS NOTES
Assessment/Plan:    Discussed principles of diabetic foot care  Sensorium is markedly diminished and vascular status is impaired  Urge patient to refrain from walking barefoot  Reassured patient that his right great toenail has healed and there is no evidence of infection  No further treatment needed for the right great toenail  Periodic palliative care recommended  Patient is rescheduled in 3 months  No problem-specific Assessment & Plan notes found for this encounter  Diagnoses and all orders for this visit:    Diabetic polyneuropathy associated with type 1 diabetes mellitus ([address] )    Other orders  -     Gvoke HypoPen 1-Pack 1 MG/0 2ML SOAJ; INJECT 1MG UNDER THE SKIN ONE TIME AS NEEDED  -     NovoLOG FlexPen ReliOn 100 UNIT/ML injection pen          Subjective:      Patient ID: Zita Dennis is a 43 y o  male  HPI     Patient, a 41-year-old type 1 diabetic with severe renal disease and known neuropathy presents for evaluation and care  Patient has profound numbness in his feet  He notes that approximately 2 months ago  his right great toenail was removed  He is concerned that it is still not healed  He has a dressing on it but notes that there has not been recent drainage  I personally reviewed an A1c dated 07/15/2022  It was 6 7  I personally reviewed an A1c dated 09/03/2021  It was 5 8  The following portions of the patient's history were reviewed and updated as appropriate: allergies, current medications, past family history, past medical history, past social history, past surgical history and problem list     Review of Systems   Cardiovascular:        Coronary artery disease   Genitourinary:        End-stage renal disease   Neurological: Positive for numbness  Objective:      BP (!) 164/105   Pulse 78   Ht 5' 11" (1 803 m)   Wt 94 6 kg (208 lb 9 6 oz)   BMI 29 09 kg/m²          Physical Exam  Cardiovascular:      Pulses: Pulses are weak  Dorsalis pedis pulses are 0 on the right side and 0 on the left side  Posterior tibial pulses are 0 on the right side and 0 on the left side  Feet:      Right foot:      Skin integrity: No ulcer, skin breakdown, erythema, warmth, callus or dry skin  Left foot:      Skin integrity: No ulcer, skin breakdown, erythema, warmth, callus or dry skin  Diabetic Foot Exam    Patient's shoes and socks removed  Right Foot/Ankle   Right Foot Inspection  Skin Exam: skin normal and skin intact  No dry skin, no warmth, no callus, no erythema, no maceration, no abnormal color, no pre-ulcer, no ulcer and no callus  Toe Exam: ROM and strength within normal limits  Sensory   Vibration: absent  Proprioception: intact  Monofilament testing: absent    Vascular  Capillary refills: elevated  The right DP pulse is 0  The right PT pulse is 0  Right Toe  - Comprehensive Exam  Ecchymosis: none  Arch: normal  Hammertoes: absent  Claw Toes: absent  Swelling: none   Tenderness: none         Left Foot/Ankle  Left Foot Inspection  Skin Exam: skin normal and skin intact  No dry skin, no warmth, no erythema, no maceration, normal color, no pre-ulcer, no ulcer and no callus  Sensory   Vibration: absent  Proprioception: intact  Monofilament testing: diminished    Vascular  Capillary refills: elevated  The left DP pulse is 0  The left PT pulse is 0       Left Toe  - Comprehensive Exam  Ecchymosis: none  Arch: normal  Hammertoes: absent  Claw toes: absent  Swelling: none   Tenderness: none           Assign Risk Category  No deformity present  Loss of protective sensation  Weak pulses  Risk: 2

## 2022-09-13 ENCOUNTER — OFFICE VISIT (OUTPATIENT)
Dept: INTERNAL MEDICINE CLINIC | Facility: CLINIC | Age: 37
End: 2022-09-13
Payer: MEDICARE

## 2022-09-13 VITALS
TEMPERATURE: 99.7 F | DIASTOLIC BLOOD PRESSURE: 84 MMHG | OXYGEN SATURATION: 98 % | HEART RATE: 82 BPM | WEIGHT: 210 LBS | SYSTOLIC BLOOD PRESSURE: 122 MMHG | HEIGHT: 71 IN | BODY MASS INDEX: 29.4 KG/M2

## 2022-09-13 DIAGNOSIS — F33.0 MILD EPISODE OF RECURRENT MAJOR DEPRESSIVE DISORDER (HCC): ICD-10-CM

## 2022-09-13 DIAGNOSIS — Z23 ENCOUNTER FOR IMMUNIZATION: ICD-10-CM

## 2022-09-13 DIAGNOSIS — E10.22 TYPE 1 DIABETES MELLITUS WITH CHRONIC KIDNEY DISEASE ON CHRONIC DIALYSIS (HCC): ICD-10-CM

## 2022-09-13 DIAGNOSIS — G62.9 PERIPHERAL POLYNEUROPATHY: ICD-10-CM

## 2022-09-13 DIAGNOSIS — I15.0 RENOVASCULAR HYPERTENSION: ICD-10-CM

## 2022-09-13 DIAGNOSIS — N18.6 TYPE 1 DIABETES MELLITUS WITH CHRONIC KIDNEY DISEASE ON CHRONIC DIALYSIS (HCC): ICD-10-CM

## 2022-09-13 DIAGNOSIS — G47.33 OSA (OBSTRUCTIVE SLEEP APNEA): Primary | ICD-10-CM

## 2022-09-13 DIAGNOSIS — N25.81 SECONDARY HYPERPARATHYROIDISM (HCC): ICD-10-CM

## 2022-09-13 DIAGNOSIS — I27.20 PULMONARY HTN (HCC): ICD-10-CM

## 2022-09-13 DIAGNOSIS — E03.9 HYPOTHYROIDISM, UNSPECIFIED TYPE: ICD-10-CM

## 2022-09-13 DIAGNOSIS — Z99.2 TYPE 1 DIABETES MELLITUS WITH CHRONIC KIDNEY DISEASE ON CHRONIC DIALYSIS (HCC): ICD-10-CM

## 2022-09-13 PROBLEM — I95.1 ORTHOSTATIC HYPOTENSION: Status: RESOLVED | Noted: 2020-10-25 | Resolved: 2022-09-13

## 2022-09-13 PROCEDURE — 90682 RIV4 VACC RECOMBINANT DNA IM: CPT | Performed by: INTERNAL MEDICINE

## 2022-09-13 PROCEDURE — 99214 OFFICE O/P EST MOD 30 MIN: CPT | Performed by: INTERNAL MEDICINE

## 2022-09-13 PROCEDURE — G0008 ADMIN INFLUENZA VIRUS VAC: HCPCS | Performed by: INTERNAL MEDICINE

## 2022-09-13 NOTE — ASSESSMENT & PLAN NOTE
-DM1 with neuropathy, gastroparesis, ESRD on HD    Lab Results   Component Value Date    HGBA1C 6 7 (H) 07/15/2022   -episodes of hypoglycemia with adjustments of insulin  -he anticipates getting an insulin pump  -follow up with Endo

## 2022-09-13 NOTE — PROGRESS NOTES
Assessment/Plan:    Problem List Items Addressed This Visit        Endocrine    Type 1 diabetes mellitus ([facility] )     -DM1 with neuropathy, gastroparesis, ESRD on HD  Lab Results   Component Value Date    HGBA1C 6 7 (H) 07/15/2022   -episodes of hypoglycemia with adjustments of insulin  -he anticipates getting an insulin pump  -follow up with Endo         Secondary hyperparathyroidism ([facility] )     -he self dc'ed sensipar  -advised to contact Nephro, likely will need to restart         Hypothyroidism     -diagnosed 12/2021  -was advised to dc levothyroxine 25mcg recently by Endo with follow up of TFTs            Respiratory    RACHEAL (obstructive sleep apnea) - Primary     -severe on PSG  -he has not found benefit with use of APAP   -follow up with sleep medicine            Cardiovascular and Mediastinum    Renovascular hypertension     -he has been discontinued off several antihypertensives with controlled readings  -he remains on doxazosin, labetalol, nifedipine and torsemide  -follow up with Renal         Pulmonary HTN ([place] [address] )     -on echo and found to have severe sleep apnea  -follow up with Cardio            Nervous and Auditory    Peripheral polyneuropathy     -secondary to undelrying diabetic neuropathy  -on gabapentin and also has MMJ card            Other    Mild episode of recurrent major depressive disorder ([place] [address] )     -advised to remain on escitalopram 20mg daily  -he now follows with therapist and psychiatrist           Other Visit Diagnoses     Encounter for immunization        Relevant Orders    influenza vaccine, quadrivalent, recombinant, PF, 0 5 mL, for patients 18 yr+ (FLUBLOK) (Completed)          Subjective:      Patient ID: Yoandy Pugh is a 42 y o  male  HPI  25-year-old male with DM 1 with neuropathy, gastroparesis, ESRD on HD, nonobstructive CAD, RACHEAL, renovascular HTN with h/o CVA, anemia, vitamin-D deficiency, hypothyroidism, MDD, ELIAS and pulmonary nodules here for follow-up care      Had ED visit 8/5 for hypoglycemia  Had adjustment of Basaglar to combat elevated morning blood sugars until he hit hypoglycemic  His BS have been higher than normal as he admits he is a little gun shy  Low blood sugars are creeping up on him  Weights are going down  He is considering a pump  He is not a fan of carb counting  Renal advised to stop doxazosin, lisinopril, metolazone and minixoidil  He has not been checking his home bp as often  He has not been incredibly dizzy or have HA  Levothyroxine was also discontinued by his Endo with plans to recheck  He became hypothyroidism in Dec     After two weeks of APAP therapy he felt worsened sleep and has found it to be cumbersome  He has continued with lexapro for anxiety and depression  He also goes to therapy      The following portions of the patient's history were reviewed and updated as appropriate: allergies, current medications, past family history, past medical history, past social history, past surgical history and problem list       Current Outpatient Medications:     aspirin (ECOTRIN LOW STRENGTH) 81 mg EC tablet, Take 81 mg by mouth daily, Disp: , Rfl:     atorvastatin (LIPITOR) 40 mg tablet, Take 1 tablet (40 mg total) by mouth every evening, Disp: 90 tablet, Rfl: 1    b complex vitamins capsule, Take 1 capsule by mouth daily before lunch , Disp: , Rfl:     B-D ULTRAFINE III SHORT PEN 31G X 8 MM MISC, USE 1 PEN NEEDLE 8 TIMES DAILY, Disp: 100 each, Rfl: 47    calcium acetate (PHOSLO) capsule, TAKE 3 CAPSULES BY MOUTH WITH MEALS, Disp: , Rfl:     cholecalciferol (VITAMIN D3) 1,000 units tablet, Take 2,000 Units by mouth daily  , Disp: , Rfl:     cinacalcet (SENSIPAR) 60 MG tablet, Take 120 mg by mouth daily 2 tab daily , Disp: , Rfl:     cloNIDine (CATAPRES-TTS-3) 0 3 mg/24 hr, 1 patch once a week , Disp: , Rfl:     escitalopram (LEXAPRO) 20 mg tablet, Take 1 tablet (20 mg total) by mouth daily, Disp: 90 tablet, Rfl: 2    famotidine (PEPCID) 40 MG tablet, Take 1 tablet (40 mg total) by mouth daily, Disp: 30 tablet, Rfl: 0    gabapentin (NEURONTIN) 100 mg capsule, Take 2 tablets at bedtime, Disp: 60 capsule, Rfl: 5    GLUCAGON EMERGENCY 1 MG injection, INJECT 1MG SUBCUTANEOUSLY AS NEEDED (AS DIRECTED)   MAY REPEAT DOSE EVERY 20 MINUTES AS NEEDED, Disp: , Rfl: 3    Gvoke HypoPen 1-Pack 1 MG/0 2ML SOAJ, INJECT 1MG UNDER THE SKIN ONE TIME AS NEEDED, Disp: , Rfl:     hydrOXYzine HCL (ATARAX) 10 mg tablet, Take 1 tablet (10 mg total) by mouth every 6 (six) hours as needed for itching, Disp: 30 tablet, Rfl: 0    insulin glargine (Basaglar KwikPen) 100 units/mL injection pen, Inject 7 Units under the skin daily at bedtime, Disp: 3 mL, Rfl: 0    labetalol (NORMODYNE) 200 mg tablet, TAKE 2 TABLETS BY MOUTH THREE TIMES DAILY, Disp: 180 tablet, Rfl: 0    NIFEdipine ER (ADALAT CC) 60 MG 24 hr tablet, Take 1 tablet (60 mg total) by mouth 2 (two) times a day, Disp: 180 tablet, Rfl: 0    NovoLOG FlexPen ReliOn 100 UNIT/ML injection pen, , Disp: , Rfl:     ofloxacin (FLOXIN) 0 3 % otic solution, Administer 10 drops to the right ear daily, Disp: 5 mL, Rfl: 0    ondansetron (ZOFRAN) 4 mg tablet, Take 1 tablet (4 mg total) by mouth every 8 (eight) hours as needed for nausea or vomiting, Disp: 90 tablet, Rfl: 0    Patiromer Sorbitex Calcium (VELTASSA PO), Take 5 g by mouth once a week, Disp: , Rfl:     torsemide (DEMADEX) 100 mg tablet, Take 100 mg by mouth daily  , Disp: , Rfl:     traZODone (DESYREL) 50 mg tablet, Take 0 5 tablets (25 mg total) by mouth daily at bedtime as needed for sleep, Disp: 30 tablet, Rfl: 0    doxazosin (CARDURA) 2 mg tablet, TAKE 2 TABLETS BY MOUTH IN THE MORNING AND 2 IN THE EVENING (Patient not taking: No sig reported), Disp: 120 tablet, Rfl: 0    levothyroxine 25 mcg tablet, Take 1 tablet (25 mcg total) by mouth daily (Patient not taking: No sig reported), Disp: 30 tablet, Rfl: 1    lisinopril (ZESTRIL) 40 mg tablet, Take 80 mg by mouth daily (Patient not taking: No sig reported), Disp: , Rfl:     metolazone (ZAROXOLYN) 10 mg tablet, Take 5 mg by mouth daily   (Patient not taking: No sig reported), Disp: , Rfl:     minoxidil (LONITEN) 2 5 mg tablet, Take 2 5 mg by mouth 2 (two) times a day  (Patient not taking: No sig reported), Disp: , Rfl:     Review of Systems   Constitutional: Positive for fatigue and unexpected weight change  Negative for activity change and appetite change  Respiratory: Negative for shortness of breath and wheezing  Cardiovascular: Negative for chest pain, palpitations and leg swelling  Genitourinary:        Nocturia   Neurological: Positive for dizziness, numbness and headaches  Psychiatric/Behavioral: Positive for dysphoric mood and sleep disturbance  The patient is nervous/anxious  Objective:    /84 (BP Location: Right arm, Patient Position: Sitting)   Pulse 82   Temp 99 7 °F (37 6 °C) (Tympanic)   Ht 5' 11" (1 803 m)   Wt 95 3 kg (210 lb)   SpO2 98%   BMI 29 29 kg/m²      Physical Exam  Vitals reviewed  Constitutional:       General: He is not in acute distress  Appearance: He is not diaphoretic  Cardiovascular:      Rate and Rhythm: Normal rate and regular rhythm  Pulses: Normal pulses  Heart sounds: Normal heart sounds  Pulmonary:      Effort: Pulmonary effort is normal  No respiratory distress  Breath sounds: Normal breath sounds  No wheezing  Musculoskeletal:      Right lower leg: No edema  Left lower leg: No edema  Skin:     General: Skin is dry  Neurological:      Mental Status: He is alert and oriented to person, place, and time     Psychiatric:         Mood and Affect: Mood normal

## 2022-09-13 NOTE — ASSESSMENT & PLAN NOTE
-he has been discontinued off several antihypertensives with controlled readings  -he remains on doxazosin, labetalol, nifedipine and torsemide  -follow up with Renal

## 2022-09-20 ENCOUNTER — APPOINTMENT (OUTPATIENT)
Dept: LAB | Facility: CLINIC | Age: 37
End: 2022-09-20
Payer: MEDICARE

## 2022-09-20 DIAGNOSIS — E72.11 HYPERHOMOCYSTEINEMIA (HCC): ICD-10-CM

## 2022-09-20 DIAGNOSIS — D63.1 ANEMIA OF CHRONIC RENAL FAILURE, UNSPECIFIED CKD STAGE: ICD-10-CM

## 2022-09-20 DIAGNOSIS — E08.42 DIABETES MELLITUS DUE TO UNDERLYING CONDITION WITH DIABETIC POLYNEUROPATHY, UNSPECIFIED WHETHER LONG TERM INSULIN USE (HCC): ICD-10-CM

## 2022-09-20 DIAGNOSIS — H46.9 OPTIC NEURITIS DUE TO MULTIPLE SCLEROSIS (HCC): ICD-10-CM

## 2022-09-20 DIAGNOSIS — G35 OPTIC NEURITIS DUE TO MULTIPLE SCLEROSIS (HCC): ICD-10-CM

## 2022-09-20 DIAGNOSIS — R11.0 NAUSEA: ICD-10-CM

## 2022-09-20 DIAGNOSIS — I63.9 CEREBROVASCULAR ACCIDENT (CVA) OF LEFT PONTINE STRUCTURE (HCC): ICD-10-CM

## 2022-09-20 DIAGNOSIS — E55.9 VITAMIN D DEFICIENCY: ICD-10-CM

## 2022-09-20 DIAGNOSIS — Z86.73 HISTORY OF LACUNAR CEREBROVASCULAR ACCIDENT (CVA): ICD-10-CM

## 2022-09-20 DIAGNOSIS — N17.0 ACUTE RENAL FAILURE WITH ACUTE TUBULAR NECROSIS SUPERIMPOSED ON STAGE 3 CHRONIC KIDNEY DISEASE (HCC): ICD-10-CM

## 2022-09-20 DIAGNOSIS — H53.8 BLURRED VISION: ICD-10-CM

## 2022-09-20 DIAGNOSIS — Z99.2 ENCOUNTER FOR EXTRACORPOREAL DIALYSIS (HCC): ICD-10-CM

## 2022-09-20 DIAGNOSIS — H51.0 BINOCULAR VISION DISORDER WITH CONJUGATE GAZE PALSY: ICD-10-CM

## 2022-09-20 DIAGNOSIS — E10.21 TYPE 1 DIABETES MELLITUS WITH DIABETIC NEPHROPATHY, WITH LONG-TERM CURRENT USE OF INSULIN (HCC): Chronic | ICD-10-CM

## 2022-09-20 DIAGNOSIS — N18.9 ANEMIA OF CHRONIC RENAL FAILURE, UNSPECIFIED CKD STAGE: ICD-10-CM

## 2022-09-20 DIAGNOSIS — H53.30 BINOCULAR VISUAL DISTURBANCE: ICD-10-CM

## 2022-09-20 DIAGNOSIS — R79.89 AZOTEMIA: ICD-10-CM

## 2022-09-20 DIAGNOSIS — N18.2 ACUTE RENAL FAILURE WITH ACUTE TUBULAR NECROSIS SUPERIMPOSED ON STAGE 2 CHRONIC KIDNEY DISEASE (HCC): ICD-10-CM

## 2022-09-20 DIAGNOSIS — E83.39 HYPERPHOSPHATEMIA: ICD-10-CM

## 2022-09-20 DIAGNOSIS — R80.1 PERSISTENT PROTEINURIA: ICD-10-CM

## 2022-09-20 DIAGNOSIS — N18.30 ACUTE RENAL FAILURE WITH ACUTE TUBULAR NECROSIS SUPERIMPOSED ON STAGE 3 CHRONIC KIDNEY DISEASE (HCC): ICD-10-CM

## 2022-09-20 DIAGNOSIS — I16.0 HYPERTENSIVE URGENCY: ICD-10-CM

## 2022-09-20 DIAGNOSIS — Z15.89 HOMOZYGOUS MTHFR MUTATION A1298C: ICD-10-CM

## 2022-09-20 DIAGNOSIS — I63.9 CEREBROVASCULAR ACCIDENT (CVA), UNSPECIFIED MECHANISM (HCC): ICD-10-CM

## 2022-09-20 DIAGNOSIS — N18.6 END STAGE RENAL DISEASE (HCC): ICD-10-CM

## 2022-09-20 DIAGNOSIS — N17.0 ACUTE RENAL FAILURE WITH ACUTE TUBULAR NECROSIS SUPERIMPOSED ON STAGE 2 CHRONIC KIDNEY DISEASE (HCC): ICD-10-CM

## 2022-09-20 LAB — DEPRECATED AT III PPP: 96 % OF NORMAL (ref 92–136)

## 2022-09-20 PROCEDURE — 85303 CLOT INHIBIT PROT C ACTIVITY: CPT

## 2022-09-20 PROCEDURE — 86038 ANTINUCLEAR ANTIBODIES: CPT

## 2022-09-20 PROCEDURE — 85306 CLOT INHIBIT PROT S FREE: CPT

## 2022-09-20 PROCEDURE — 86147 CARDIOLIPIN ANTIBODY EA IG: CPT

## 2022-09-20 PROCEDURE — 85300 ANTITHROMBIN III ACTIVITY: CPT

## 2022-09-20 PROCEDURE — 86146 BETA-2 GLYCOPROTEIN ANTIBODY: CPT

## 2022-09-20 PROCEDURE — 36415 COLL VENOUS BLD VENIPUNCTURE: CPT

## 2022-09-20 RX ORDER — ATORVASTATIN CALCIUM 40 MG/1
40 TABLET, FILM COATED ORAL EVERY EVENING
Qty: 90 TABLET | Refills: 3 | Status: SHIPPED | OUTPATIENT
Start: 2022-09-20

## 2022-09-21 LAB — RYE IGE QN: NEGATIVE

## 2022-09-22 LAB
PROT C AG ACT/NOR PPP IA: 149 % OF NORMAL (ref 60–150)
PROT S ACT/NOR PPP: 108 % (ref 71–117)

## 2022-09-23 DIAGNOSIS — R42 VERTIGO: ICD-10-CM

## 2022-09-23 LAB
B2 GLYCOPROT1 IGA SERPL IA-ACNC: 1.9
B2 GLYCOPROT1 IGG SERPL IA-ACNC: 0.8
B2 GLYCOPROT1 IGM SERPL IA-ACNC: <2.9
CARDIOLIPIN IGA SER IA-ACNC: 5.3
CARDIOLIPIN IGG SER IA-ACNC: 1
CARDIOLIPIN IGM SER IA-ACNC: 1.6

## 2022-09-23 RX ORDER — ONDANSETRON 4 MG/1
TABLET, FILM COATED ORAL
Qty: 90 TABLET | Refills: 0 | Status: SHIPPED | OUTPATIENT
Start: 2022-09-23

## 2022-09-26 LAB — F5 GENE MUT ANL BLD/T: NORMAL

## 2022-09-27 ENCOUNTER — OFFICE VISIT (OUTPATIENT)
Dept: PSYCHIATRY | Facility: CLINIC | Age: 37
End: 2022-09-27
Payer: MEDICARE

## 2022-09-27 DIAGNOSIS — F51.04 PSYCHOPHYSIOLOGICAL INSOMNIA: ICD-10-CM

## 2022-09-27 DIAGNOSIS — F41.1 GENERALIZED ANXIETY DISORDER: ICD-10-CM

## 2022-09-27 DIAGNOSIS — Z79.899 MEDICAL CANNABIS USE: ICD-10-CM

## 2022-09-27 DIAGNOSIS — F33.0 MILD EPISODE OF RECURRENT MAJOR DEPRESSIVE DISORDER (HCC): Primary | ICD-10-CM

## 2022-09-27 PROCEDURE — 90833 PSYTX W PT W E/M 30 MIN: CPT | Performed by: STUDENT IN AN ORGANIZED HEALTH CARE EDUCATION/TRAINING PROGRAM

## 2022-09-27 PROCEDURE — 99213 OFFICE O/P EST LOW 20 MIN: CPT | Performed by: STUDENT IN AN ORGANIZED HEALTH CARE EDUCATION/TRAINING PROGRAM

## 2022-09-27 NOTE — PSYCH
MEDICATION MANAGEMENT NOTE        [address]      Name and Date of Birth: Magaly Mckeon 42 y o  1985 MRN: [id number]    Date of Visit: September 27, 2022    Reason for Visit: Follow-up visit for medication management     SUBJECTIVE:    Ariel Curtis is a 42 y o  male with past psychiatric history significant for major depressive disorder, generalized anxiety, cannabis use (medical) and insomnia who was personally seen and evaluated today at the [address] [place] [address] E outpatient clinic for follow-up and medication management  Brittny Wong presents as calm, pleasant, and cooperative  His thoughts are linear and organized  He completes psychiatric assessment without difficulty  Brittny Wong endorses compliance with psychotropic medication regimen consisting of Lexapro, PRN Hydroxyzine, and PRN Trazodone  He denies adverse side effects  Lexapro was optimized last visit secondary to acute depressive symptomatology and anxiety  He tolerated this change without incident  He denies robust improvement in mood or anxiety control  However, he is brighter in affect  He continues to voice distress regarding physical concerns and "feeling like a burden" to his family  Supportive therapy provided  Garland Heraclio remains on the renal transplant list  He continues to undergo dialysis, suggestive of self preservation  He once again discusses the possibility of a living donor today and internalized "guilt" related to this  Garland Pulliam currently reports adequate sleep  He has used PRN Trazodone "1x" over the last month  His appetite is satisfactory  Garland Pulliam denies SI/HI when asked  He is without crying spells, hopelessness, or changes in concentration/focus  He does report mild anhedonia and frustration with current living situation  We discussed avenues to greater independence  Garland Pulliam reports ongoing worry and distress related to his medical health   His anxiety is NOT currently impairing functionality  He denies recent panic symptoms  During today's examination, Angy Rao does not exhibit objective evidence of aziza/hypomania or caprice psychosis  Angy Rao denies recent ETOH or illicit substance abuse  Current Rating Scores:     None completed today  Review Of Systems:      Constitutional fluctuating energy level and as noted in HPI   ENT negative   Cardiovascular negative   Respiratory negative   Gastrointestinal negative   Genitourinary negative   Musculoskeletal back pain and forearm pain   Integumentary negative   Neurological negative   Endocrine negative   Other Symptoms none, all other systems are negative          Past Psychiatric History: (unchanged information from previous note copied and italicized) - Information that is bolded has been updated       Inpatient psychiatric admissions: Denies  Prior outpatient psychiatric linkage: Denies  Past/current psychotherapy: Currently linked with [facility]  History of suicidal attempts/gestures: Denies  History of violence/aggressive behaviors: Denies  Psychotropic medication trials: Lexapro (3 years ago), Zoloft, Trazodone (now)  Substance abuse inpatient/outpatient rehabilitation: Denies     Substance Abuse History: (unchanged information from previous note copied and italicized) - Information that is bolded has been updated       No history of ETOH or illict substance abuse  Angy Rao does endorse previous tobacco abuse and current medical cannabis prescription  No past legal actions or arrests secondary to substance intoxication  The patient denies prior DWIs/DUIs  No history of outpatient/inpatient rehabilitation programs  Mateus does not exhibit objective evidence of substance withdrawal during today's examination nor does Mateus appear under the influence of any psychoactive substance           Social History: (unchanged information from previous note copied and italicized) - Information that is bolded has been updated     Developmental: Denies a history of milestone/developmental delay  Denies a history of in-utero exposure to toxins/illicit substances  There is no documented history of IEP or need for special education  He was born and raised in [place], [place]  He lived there for 27 years  He moved to the [facility] in 2011 to live with his sister and work as   His parents have since relocated and he now lives with them    [address] [facility] - [address] [employer] class of 2006 - degree in theatre   Marital history: single  Living arrangement, social support: parents - has a sister who has children (nephews and nieces)   Occupational History: on permanent disability  Access to firearms: Denies direct access to weapons/firearms  Mateus Mckeon has no history of arrests or violence with a deadly weapon       Traumatic History: (unchanged information from previous note copied and italicized) - Information that is bolded has been updated      Abuse:none is reported  Other Traumatic Events: 2 CVAs - 2015 and 2018      Past Medical History:    Past Medical History:   Diagnosis Date    Acute kidney injury ([address] )     Ambulates with cane     Anuria     Anxiety     Cellulitis of right elbow 3/31/2021    Chronic kidney disease     Depression     Diabetes mellitus ([address] )     Diarrhea     Emesis 10/24/2020    End stage renal disease ([facility] ) 2/11/2018    Formatting of this note might be different from the original  Last Assessment & Plan:  Secondary to DM  On nightly PD  Followed by [facility]    Patient considering transplant for kidney and pancreas through [id number] of this note might be different from the original  Last Assessment & Plan:  Formatting of this note might be different from the original  Lab Results  Component Value Date   EGFR     Falls     Gastroparesis     GERD (gastroesophageal reflux disease)     History of shingles 2010    History of transfusion 02/2018    no adverse reaction    Hyperlipidemia  Hyperphosphatemia     Hypertension     Itching     Mastoiditis of right side 7/15/2022    Muscle weakness     general unsteadiness    Obesity (BMI 30 0-34 9) 9/9/2019    Orthostatic hypotension 10/25/2020    PONV (postoperative nausea and vomiting) 1/26/2018    Protein-calorie malnutrition ([address] ) 11/23/2020    Recurrent peritonitis ([facility] [address] ) due to peritoneal dialysis catheter 7/31/2020    Retinopathy     Seizures ([facility] )     early 2020 - one time    Skin abnormality     some dime size areas where skin was scratched from itching    Spontaneous bacterial peritonitis ([facility] ) 10/19/2020    Stroke ([facility] [address] [facility] )     x2 - off balance/no driving/fatigue    Swelling of both lower extremities     Vomiting     Wears glasses         Past Surgical History:   Procedure Laterality Date    CARDIAC LOOP RECORDER  05/2018    COLONOSCOPY      EGD      EYE SURGERY Right     IR AV FISTULAGRAM/GRAFTOGRAM  2/23/2021    IR TUNNELED CENTRAL LINE PLACEMENT  2/16/2021    IR TUNNELED DIALYSIS CATHETER PLACEMENT  11/18/2020    IR TUNNELED DIALYSIS CATHETER REMOVAL  2/12/2021    IR TUNNELED DIALYSIS CATHETER REMOVAL  3/11/2021    PERITONEAL CATHETER INSERTION N/A 8/27/2018    Procedure: UNROOF PD CATHETER;  Surgeon: Val Bryant DO;  Location: [facility];  Service: General    AK ANASTOMOSIS,AV,ANY SITE Left 11/9/2020    Procedure: CREATION FISTULA  ARTERIOVENOUS (AV) - LEFT WRIST;  Surgeon: Erick Duncan MD;  [facility];  Service: Vascular    AK ESOPHAGOGASTRODUODENOSCOPY TRANSORAL DIAGNOSTIC N/A 4/18/2019    Procedure: ESOPHAGOGASTRODUODENOSCOPY (EGD); Surgeon: Ganga Russell MD;  Location: [facility];   Service: Gastroenterology    AK LAP INSERTION TUNNELED INTRAPERITONEAL CATHETER N/A 8/6/2018    Procedure: LAPAROSCOPIC PD CATHETER PLACEMENT;  Surgeon: Val Bryant DO;  [facility];  Service: General    AK REMOVAL TUNNELED INTRAPERITONEAL CATHETER N/A 11/18/2020    Procedure: REMOVAL CATHETER PERITONEAL DIALYSIS;  Surgeon: Dian Perez MD;  [facility];  Service: General    TONSILLECTOMY      UPPER GASTROINTESTINAL ENDOSCOPY       Allergies   Allergen Reactions    Sulfa Antibiotics Rash       Substance Abuse History:    Social History     Substance and Sexual Activity   Alcohol Use Yes    Comment: occassionally     Social History     Substance and Sexual Activity   Drug Use Yes    Types: Marijuana    Comment: medical marijuana       Social History:    Social History     Socioeconomic History    Marital status: Single     Spouse name: Not on file    Number of children: Not on file    Years of education: Not on file    Highest education level: Not on file   Occupational History    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   Tobacco Use    Smoking status: Former Smoker     Packs/day: 0 50     Years: 12 00     Pack years: 6 00     Types: Cigarettes     Quit date: 2018     Years since quittin 6    Smokeless tobacco: Never Used    Tobacco comment: quit 2018   Vaping Use    Vaping Use: Every day    Substances: THC, CBD   Substance and Sexual Activity    Alcohol use: Yes     Comment: occassionally    Drug use: Yes     Types: Marijuana     Comment: medical marijuana    Sexual activity: Not on file   Other Topics Concern    Not on file   Social History Narrative    Caffeine use    single     Social Determinants of Health     Financial Resource Strain: Not on file   Food Insecurity: Not on file   Transportation Needs: Not on file   Physical Activity: Not on file   Stress: Not on file   Social Connections: Not on file   Intimate Partner Violence: Not on file   Housing Stability: Not on file       Family Psychiatric History:     Family History   Problem Relation Age of Onset    Breast cancer Mother     Hypertension Mother     Hyperlipidemia Father     Hypertension Father     Leukemia Maternal Grandmother     Hyperlipidemia Maternal Grandfather     Hypertension Maternal Grandfather     Hyperlipidemia Paternal Grandmother     Hypertension Paternal Grandmother     Heart disease Paternal Grandfather         cardiac disorder    Diabetes Paternal Grandfather        History Review: The following portions of the patient's history were reviewed and updated as appropriate: allergies, current medications, past family history, past medical history, past social history, past surgical history and problem list          OBJECTIVE:     Vital signs in last 24 hours: There were no vitals filed for this visit      Mental Status Evaluation:    Appearance age appropriate, casually dressed, dressed appropriately, looks older than stated age, wearing a hat   Behavior pleasant, cooperative, calm, good eye contact   Speech normal rate, normal volume, normal pitch   Mood dysphoric   Affect brighter, more appropriate, less constricted   Thought Processes organized, logical, goal directed   Associations intact associations   Thought Content no overt delusions   Perceptual Disturbances: no auditory hallucinations, no visual hallucinations   Abnormal Thoughts  Risk Potential Suicidal ideation - None at present  Homicidal ideation - None at present  Potential for aggression - No   Orientation oriented to person, place, time/date and situation   Memory recent and remote memory grossly intact   Consciousness alert and awake   Attention Span Concentration Span attention span and concentration are age appropriate   Intellect appears to be of average intelligence   Insight intact and good   Judgement intact and good   Muscle Strength and  Gait unstable gait, uses cane   Motor activity no abnormal movements   Language no difficulty naming common objects   Fund of Knowledge adequate knowledge of current events   Pain none   Pain Scale 0       Laboratory Results: I have personally reviewed all pertinent laboratory/tests results    Recent Labs (last 4 months):   Appointment on 09/20/2022 Component Date Value    CARMEN 09/20/2022 Negative     ANTICARDIOLIPIN IGG ANTI* 09/20/2022 1 0     ANTICARDIOLIPIN IGA ANTI* 09/20/2022 5 3     ANTICARDIOLIPIN IGM ANTI* 09/20/2022 1 6     AntiThrombIN III Activity 09/20/2022 96     Factor V Leiden 09/20/2022 Comment     Protein C Activity 09/20/2022 149 0     PROTEIN S ACTIVITY 09/20/2022 108     BETA 2 GLYCO 1 IGG 09/20/2022 0 8     BETA 2 GLYCO 1 IGA 09/20/2022 1 9     BETA 2 GLYCO 1 IGM 09/20/2022 <2 9    Admission on 08/05/2022, Discharged on 08/05/2022   Component Date Value    POC Glucose 08/05/2022 124     POC Glucose 08/05/2022 164 (A)   Admission on 07/15/2022, Discharged on 07/16/2022   Component Date Value    POC Glucose 07/15/2022 87     Ventricular Rate 07/15/2022 56     Atrial Rate 07/15/2022 56     UT Interval 07/15/2022 192     QRSD Interval 07/15/2022 86     QT Interval 07/15/2022 476     QTC Interval 07/15/2022 459     P Axis 07/15/2022 64     QRS Axis 07/15/2022 55     T Wave Axis 07/15/2022 -28     WBC 07/15/2022 6 06     RBC 07/15/2022 3 74 (A)    Hemoglobin 07/15/2022 12 8     Hematocrit 07/15/2022 37 4     MCV 07/15/2022 100 (A)    MCH 07/15/2022 34 2     MCHC 07/15/2022 34 2     RDW 07/15/2022 15 5 (A)    MPV 07/15/2022 10 9     Platelets 76/27/3293 217     nRBC 07/15/2022 0     Neutrophils Relative 07/15/2022 52     Immat GRANS % 07/15/2022 1     Lymphocytes Relative 07/15/2022 28     Monocytes Relative 07/15/2022 10     Eosinophils Relative 07/15/2022 8 (A)    Basophils Relative 07/15/2022 1     Neutrophils Absolute 07/15/2022 3 22     Immature Grans Absolute 07/15/2022 0 03     Lymphocytes Absolute 07/15/2022 1 69     Monocytes Absolute 07/15/2022 0 58     Eosinophils Absolute 07/15/2022 0 47     Basophils Absolute 07/15/2022 0 07     Sodium 07/15/2022 143     Potassium 07/15/2022 4 0     Chloride 07/15/2022 98     CO2 07/15/2022 30     ANION GAP 07/15/2022 15 (A)    BUN 07/15/2022 43 (A)    Creatinine 07/15/2022 10 32 (A)    Glucose 07/15/2022 76     Calcium 07/15/2022 8 8     AST 07/15/2022 24     ALT 07/15/2022 15     Alkaline Phosphatase 07/15/2022 106 (A)    Total Protein 07/15/2022 7 2     Albumin 07/15/2022 4 8     Total Bilirubin 07/15/2022 0 65     eGFR 07/15/2022 5     Lipase 07/15/2022 63     LACTIC ACID 07/15/2022 1 0     hs TnI 0hr 07/15/2022 27     Blood Culture 07/15/2022 Staphylococcus coagulase negative (A)    Gram Stain Result 07/15/2022 Gram positive cocci in clusters (A)    Blood Culture 07/15/2022 No Growth After 5 Days       SARS-CoV-2 07/15/2022 Negative     INFLUENZA A PCR 07/15/2022 Negative     INFLUENZA B PCR 07/15/2022 Negative     RSV PCR 07/15/2022 Negative     Procalcitonin 07/15/2022 0 36 (A)    Cortisol, Random 07/15/2022 29 9     TSH 3RD GENERATON 07/15/2022 4 137     POC Glucose 07/15/2022 236 (A)    hs TnI 2hr 07/15/2022 22     Delta 2hr hsTnI 07/15/2022 -5     EXT Fecal Occult Blood 07/15/2022 Negative     Control 07/15/2022 Valid     POC Glucose 07/15/2022 379 (A)    Hemoglobin A1C 07/15/2022 6 7 (A)    EAG 07/15/2022 146     POC Glucose 07/15/2022 >500 (A)    POC Glucose 07/15/2022 499 (A)    POC Glucose 07/15/2022 146 (A)    POC Glucose 07/15/2022 204 (A)    Sodium 07/16/2022 138     Potassium 07/16/2022 4 4     Chloride 07/16/2022 98     CO2 07/16/2022 29     ANION GAP 07/16/2022 11     BUN 07/16/2022 30 (A)    Creatinine 07/16/2022 8 51 (A)    Glucose 07/16/2022 254 (A)    Calcium 07/16/2022 8 6     eGFR 07/16/2022 7     WBC 07/16/2022 5 53     RBC 07/16/2022 3 35 (A)    Hemoglobin 07/16/2022 11 4 (A)    Hematocrit 07/16/2022 34 2 (A)    MCV 07/16/2022 102 (A)    MCH 07/16/2022 34 0     MCHC 07/16/2022 33 3     RDW 07/16/2022 15 4 (A)    MPV 07/16/2022 10 7     Platelets 75/77/9633 212     nRBC 07/16/2022 0     Neutrophils Relative 07/16/2022 58     Immat GRANS % 07/16/2022 0     Lymphocytes Relative 07/16/2022 26     Monocytes Relative 07/16/2022 9     Eosinophils Relative 07/16/2022 6     Basophils Relative 07/16/2022 1     Neutrophils Absolute 07/16/2022 3 23     Immature Grans Absolute 07/16/2022 0 02     Lymphocytes Absolute 07/16/2022 1 42     Monocytes Absolute 07/16/2022 0 48     Eosinophils Absolute 07/16/2022 0 31     Basophils Absolute 07/16/2022 0 07     POC Glucose 07/16/2022 215 (A)    Staphylococcus epidermid* 07/15/2022 Detected (A)    mecA/C (Methicillin-resi* 07/15/2022 Detected (A)   Telephone on 07/08/2022   Component Date Value    Supplier Name 07/12/2022 [employer] - [id number] Supplier Phone Number 07/12/2022 [phone]     Order Status 07/12/2022 Delivery Successful     Delivery Note 07/12/2022 DME setup 7/26/22 Farrel Cords     Delivery Request Date 07/12/2022 07/12/2022     Date Delivered  07/12/2022 07/26/2022     Supplier Name 07/12/2022 07/26/2022     Item Description 07/12/2022 CPAP Machine, Resmed S10 Auto-CPAP     Item Description 07/12/2022 PAP Mask, Full Face, Fit Upon Setup, N/A, 1 per 3 months     Item Description 07/12/2022 PAP Mask Interface Cushion, Full Face, 1 per 1 month     Item Description 07/12/2022 PAP Headgear, 1 per 6 months     Item Description 07/12/2022 PAP Humidifier, Heated     Item Description 07/12/2022 Disposable PAP Filter, 2 per 1 month     Item Description 07/12/2022 Non-Disposable PAP Filter, 1 per 6 months     Item Description 07/12/2022 PAP Machine Tubing, Heated, 1 per 3 months     Item Description 07/12/2022 PAP Monitoring Modem     Item Description 07/12/2022 Humidifier Water Chamber, 1 per 6 months        Suicide/Homicide Risk Assessment:    The following interventions are recommended: no intervention changes needed      Lethality Statement:    Based on today's assessment and clinical criteria, Grecia Ramirez for safety and is not an imminent risk of harm to self or others  Outpatient level of care is deemed appropriate at this current time  Pipo Paredes understands that if they can no longer contract for safety, they need to call the office or report to their nearest Emergency Room for immediate evaluation  Assessment/Plan:     Sarah Fox a 42 y  o  male with past psychiatric history significant for major depressive disorder, generalized anxiety, cannabis use (medical) and insomnia who was personally seen and evaluated today at the [address] [place] [address] [address] outpatient clinic for follow-up and medication management  Bill endorses an extensive medical history complicated by 2 cerebral vascular accidents in 2015 and 2018  Gopi Foy states that the etiology of these CVAs is unknown but suspected to be secondary to unmanaged diabetes mellitus, hypertension, and history of nicotine use  Gopi Foy states that his second CVA in 2018 was far more debilitating than the first and result in significant impairment in occupational and social functionality  He now ambulates slowly with a walking cane and is on disability  He is no longer independent and requires transportation assistance via family  He also had to move back in with his parents which has been distressing  Over the last few years, Gopi Foy states that he was placed on the [facility] renal transplant list and was awaiting a harvested kidney  In October 2020, in the context of familial discord and recent verbal disagreement with mother, Gopi Foy voiced vague and fleeting thoughts of self-harm to his dialysis nurse  She was perturbed by these statements and referred him to his PCP  While being evaluated by his PCP, Gopi Foy was then sent to the ED where he was ultimately given psychiatric resources in the community and discharged home  Unfortunately, as a result of this incident, Gopi Foy was removed from the transplant list for "1 year"   This is particularly disconcerting as Gopi Foy did not actually harm himself or engage in any self-destructive behavior  He was transparent with his treatment team and emotional raw after a disagreement with his mother  By undergoing dialysis at the exact moment he voiced these concerns to his nurse, he was seeking treatment and thus, acting in a self-preserving manner, suggestive of a will to live  Chanell Dumas is frustrated regarding being removed from the renal transplant process and is seeking transplant via [facility]  His frustration regarding this process again, is representative of future-orientation and a will to live        Historically, Mateus denies most neurovegetative symptomatology suggestive of major depressive disorder or dysthymia  Mateus does admit to fragmented or non-restorative sleep that ultimately contributes to anergia and amotivation  He has lost his sense of connectivity and purpose as a result of his CVAs and physical limitations, however, he is engaging in therapy and now medication management to regain these  Mateus adamantly denies acute thoughts of suicide or self-harm  Paulo denies acute anxiety that is overwhelming but does repot historical symptomatology suggestive of pathologic/overwhelming anxiety  He does not experience daily or weekly panic attacks  He admits to mild nervousness and fearfulness regarding his health, which is appropriate  He does not feel on-edge, restless, or perpetually irritable  Mateus vehemently denies any acute or chronic history suggestive of an underlying affective (bipolar) organization  illearle denies historical symptomatology suggestive of an underlying psychotic process       Today's Plan:     Psychopharmacologically, Paulo reports tolerability associated with PRN Trazodone, Lexapro, and Prn Hydroxyzine  He denies adverse side effects  Acutely, Chanell Dumas believes his current medication regimen is adequate  He denies need for changes  He believes his dysphoria and episodic distress are medically/physically based   As such, no changes are warranted           DSM-V Diagnoses:      1 ) Major depressive disorder (moderate, recurrent)  2 ) Generalized Anxiety Disorder  3 ) Medical Cannabis Use  4 ) Insomnia        Treatment Recommendations/Precautions:        1 ) Major depressive disorder (moderate, recurrent)  - Continue Lexapro 20mg Daily  - Continue engagement in individual psychotherapy with Shaila Atwood  - Psychoeducation provided regarding the importance of exercise and healthy dietary choices and their impact on mood, energy, and motivation  - Counseled to avoid ETOH, illict substances, and nicotine secondary to the detrimental effects of these substances on mental and physical health  - Encouraged to engage in non-verbal forms of therapy such as art therapy, music therapy, and mindfulness  - Discussed the bio-psycho-social model to treatment and therapeutic exercises/interventions were attempted to cognitively restructure thoughts     2  ) Generalized Anxiety Disorder  - Continue Lexapro 20mg Daily  - Continue PRN Hydroxyzine 10mg         3  ) Medical Cannabis Use  - Counseled to avoid ETOH, illict substances, and nicotine secondary to the detrimental effects of these substances on mental and physical health  - No concerns for misuse      4 ) Insomnia   - Continue Trazodone 25mg QHS PRN       Medication management every 3 months  Continue psychotherapy with [facility] therapist Vera Panchal  Aware of need to follow up with family physician for medical issues  Aware of 24 hour and weekend coverage for urgent situations accessed by calling [facility] main practice number    Medications Risks/Benefits      Risks, Benefits And Possible Side Effects Of Medications:    Risks, benefits, and possible side effects of medications explained to Laurann Cabot including risk of suicidality and serotonin syndrome related to treatment with antidepressants  He verbalizes understanding and agreement for treatment      Controlled Medication Discussion:     No recent records found for controlled prescriptions according to [place] Prescription Drug Monitoring Program    Psychotherapy Provided:     Individual psychotherapy provided: Yes  Counseling was provided during the session today for 16 minutes  Medication changes discussed with Bhavna Lara   Medication education provided to Bhavna Lara   Recent stressor including family conflict, family issues, financial problems, health issues, medical problems, chronic pain, recent medication change, limited support, social difficulties, everyday stressors and occasional anxiety discussed with Bhavna Lara    Coping strategies reviewed with Bhavna Lara    Educated on importance of medication and treatment compliance  Importance of follow up with family physician for medical issues reviewed with Bhavna Lara   Supportive therapy provided  Treatment Plan:    Completed and signed during the session: Not applicable - Treatment Plan to be completed by [facility] [address] therapist    Note Share Disclaimer:      This note was not shared with the patient due to reasonable likelihood of causing patient harm      Spencer Altamirano MD  Board Certified Diplomate of the [address] [place] [address] Rd , [address] of Psychiatry and Neurology  09/27/22

## 2022-09-28 LAB — MISCELLANEOUS LAB TEST RESULT: NORMAL
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
## 2022-10-10 ENCOUNTER — TELEPHONE (OUTPATIENT)
Dept: PSYCHIATRY | Facility: CLINIC | Age: 37
End: 2022-10-10

## 2022-10-10 NOTE — TELEPHONE ENCOUNTER
Pt called in to change an appt he had with Dr Nasima Barton  Pt changed his schedule so he could make an appt with Selin Part  Writer tried to make an appt for the patient but upon trying to do so saw that there is schedule yet  Can you please reach out to this pt to schedule an appt when this becomes available

## 2022-10-11 ENCOUNTER — HOSPITAL ENCOUNTER (EMERGENCY)
Facility: HOSPITAL | Age: 37
Discharge: HOME | End: 2022-10-11
Attending: EMERGENCY MEDICINE
Payer: MEDICARE

## 2022-10-11 VITALS
OXYGEN SATURATION: 97 % | SYSTOLIC BLOOD PRESSURE: 144 MMHG | RESPIRATION RATE: 16 BRPM | DIASTOLIC BLOOD PRESSURE: 78 MMHG | HEART RATE: 80 BPM | TEMPERATURE: 97.7 F

## 2022-10-11 DIAGNOSIS — D22.9 SKIN MOLE: Primary | ICD-10-CM

## 2022-10-11 PROCEDURE — 99283 EMERGENCY DEPT VISIT LOW MDM: CPT

## 2022-10-11 PROCEDURE — 99282 EMERGENCY DEPT VISIT SF MDM: CPT | Performed by: EMERGENCY MEDICINE

## 2022-10-12 ENCOUNTER — TELEPHONE (OUTPATIENT)
Dept: PSYCHIATRY | Facility: CLINIC | Age: 37
End: 2022-10-12

## 2022-10-12 NOTE — DISCHARGE INSTRUCTIONS
Avoid scratching, avoid irritating clothing, apply hydrating lotion if skin feels dry,  Clean with alcohol if bleeding

## 2022-10-12 NOTE — ED ATTENDING ATTESTATION
10/11/2022  I, Georgie Schulz MD, saw and evaluated the patient  I have discussed the patient with the resident/non-physician practitioner and agree with the resident's/non-physician practitioner's findings, Plan of Care, and MDM as documented in the resident's/non-physician practitioner's note, except where noted  All available labs and Radiology studies were reviewed  I was present for key portions of any procedure(s) performed by the resident/non-physician practitioner and I was immediately available to provide assistance  At this point I agree with the current assessment done in the Emergency Department  I have conducted an independent evaluation of this patient a history and physical is as follows:    43 y/o male presenting for evaluation of a mole on the left temporal area  Patient states the 1st noticed a mole several years ago but that it accidentally got the top shaved off of it when he was getting his hair cut at a [employer] shop  It subsequently grew back and over the last several days has been pruritic  He scratched it yesterday causing it to bleed  Over the last couple of days he has noted that the borders of the mole have become elevated prompting him to present to the emergency department for evaluation  He is concerned that it may be infected  Denies fevers, chills, or systemic symptoms  Physical Exam  Vitals reviewed  Constitutional:       General: He is not in acute distress  Appearance: Normal appearance  He is not ill-appearing or toxic-appearing  HENT:      Head: Normocephalic and atraumatic  Right Ear: External ear normal       Left Ear: External ear normal       Nose: Nose normal    Eyes:      Conjunctiva/sclera: Conjunctivae normal    Cardiovascular:      Rate and Rhythm: Normal rate  Pulmonary:      Effort: Pulmonary effort is normal  No respiratory distress  Abdominal:      General: There is no distension  Musculoskeletal:         General: No deformity  Normal range of motion  Cervical back: Normal range of motion  Skin:     General: Skin is warm and dry  Comments: 1 5 cm circumferential lesion to the L temporal area with raised borders and central invagination with overlying dried blood  No surrounding erythema or drainage  Neurological:      General: No focal deficit present  Mental Status: He is alert and oriented to person, place, and time  Psychiatric:         Mood and Affect: Mood normal            ED Course     Lesion to the left temporal area does not appear infected  Suspect mechanically irritated mole verses basal cell carcinoma  Will place referral to Dermatology for biopsy  No indication for antibiotics at this time  Return precautions discussed      Critical Care Time  Procedures

## 2022-10-12 NOTE — ED PROVIDER NOTES
History  Chief Complaint   Patient presents with   • Medical Problem     Pt arrives to ED c/o mole on side of face becoming infected after he states he might have cut it by accident shaving about 1 week ago  A 41 yo m present with complains of infected mole on the left temporal area  Pt states that several years ago the mole was shaved in a [employer] shop but grew back  Pt states that during the last several days he scratch it and it was bleeding a little  Pt was applying neosporin and covered it with Band-Aid  Pt states that lesion become elevated during the last week  Prior to Admission Medications   Prescriptions Last Dose Informant Patient Reported? Taking? B-D ULTRAFINE III SHORT PEN 31G X 8 MM MISC  Self No No   Sig: USE 1 PEN NEEDLE 8 TIMES DAILY   GLUCAGON EMERGENCY 1 MG injection  Self Yes No   Sig: INJECT 1MG SUBCUTANEOUSLY AS NEEDED (AS DIRECTED)   MAY REPEAT DOSE EVERY 20 MINUTES AS NEEDED   Gvoke HypoPen 1-Pack 1 MG/0 2ML SOAJ  Self Yes No   Sig: INJECT 1MG UNDER THE SKIN ONE TIME AS NEEDED   NIFEdipine ER (ADALAT CC) 60 MG 24 hr tablet  Self No No   Sig: Take 1 tablet (60 mg total) by mouth 2 (two) times a day   NovoLOG FlexPen ReliOn 100 UNIT/ML injection pen  Self Yes No   Patiromer Sorbitex Calcium (VELTASSA PO)  Self Yes No   Sig: Take 5 g by mouth once a week   aspirin (ECOTRIN LOW STRENGTH) 81 mg EC tablet  Self Yes No   Sig: Take 81 mg by mouth daily   atorvastatin (LIPITOR) 40 mg tablet   No No   Sig: Take 1 tablet (40 mg total) by mouth every evening   b complex vitamins capsule  Self Yes No   Sig: Take 1 capsule by mouth daily before lunch    calcium acetate (PHOSLO) capsule  Self Yes No   Sig: TAKE 3 CAPSULES BY MOUTH WITH MEALS   cholecalciferol (VITAMIN D3) 1,000 units tablet  Self Yes No   Sig: Take 2,000 Units by mouth daily     cinacalcet (SENSIPAR) 60 MG tablet  Self Yes No   Sig: Take 120 mg by mouth daily 2 tab daily    cloNIDine (CATAPRES-TTS-3) 0 3 mg/24 hr  Self Yes No Si patch once a week    doxazosin (CARDURA) 2 mg tablet  Self No No   Sig: TAKE 2 TABLETS BY MOUTH IN THE MORNING AND 2 IN THE EVENING   Patient not taking: No sig reported   escitalopram (LEXAPRO) 20 mg tablet  Self No No   Sig: Take 1 tablet (20 mg total) by mouth daily   famotidine (PEPCID) 40 MG tablet  Self No No   Sig: Take 1 tablet (40 mg total) by mouth daily   gabapentin (NEURONTIN) 100 mg capsule  Self No No   Sig: Take 2 tablets at bedtime   hydrOXYzine HCL (ATARAX) 10 mg tablet  Self No No   Sig: Take 1 tablet (10 mg total) by mouth every 6 (six) hours as needed for itching   insulin glargine (Basaglar KwikPen) 100 units/mL injection pen  Self No No   Sig: Inject 7 Units under the skin daily at bedtime   labetalol (NORMODYNE) 200 mg tablet  Self No No   Sig: TAKE 2 TABLETS BY MOUTH THREE TIMES DAILY   levothyroxine 25 mcg tablet  Self No No   Sig: Take 1 tablet (25 mcg total) by mouth daily   Patient not taking: No sig reported   lisinopril (ZESTRIL) 40 mg tablet  Self Yes No   Sig: Take 80 mg by mouth daily   Patient not taking: No sig reported   metolazone (ZAROXOLYN) 10 mg tablet  Self Yes No   Sig: Take 5 mg by mouth daily     Patient not taking: No sig reported   minoxidil (LONITEN) 2 5 mg tablet  Self Yes No   Sig: Take 2 5 mg by mouth 2 (two) times a day    Patient not taking: No sig reported   ofloxacin (FLOXIN) 0 3 % otic solution  Self No No   Sig: Administer 10 drops to the right ear daily   ondansetron (ZOFRAN) 4 mg tablet   No No   Sig: TAKE 1 TABLET BY MOUTH EVERY 8 HOURS AS NEEDED FOR NAUSEA FOR VOMITING   torsemide (DEMADEX) 100 mg tablet  Self Yes No   Sig: Take 100 mg by mouth daily     traZODone (DESYREL) 50 mg tablet  Self No No   Sig: Take 0 5 tablets (25 mg total) by mouth daily at bedtime as needed for sleep      Facility-Administered Medications: None       Past Medical History:   Diagnosis Date   • Acute kidney injury ([facility] [address] )    • Ambulates with cane    • Anuria    • Anxiety • Cellulitis of right elbow 3/31/2021   • Chronic kidney disease    • Depression    • Diabetes mellitus ([address] )    • Diarrhea    • Emesis 10/24/2020   • End stage renal disease ([address] ) 2/11/2018    Formatting of this note might be different from the original  Last Assessment & Plan:  Secondary to DM  On nightly PD  Followed by [facility]    Patient considering transplant for kidney and pancreas through [address] of this note might be different from the original  Last Assessment & Plan:  Formatting of this note might be different from the original  Lab Results  Component Value Date   EGFR    • Falls    • Gastroparesis    • GERD (gastroesophageal reflux disease)    • History of shingles 2010   • History of transfusion 02/2018    no adverse reaction   • Hyperlipidemia    • Hyperphosphatemia    • Hypertension    • Itching    • Mastoiditis of right side 7/15/2022   • Muscle weakness     general unsteadiness   • Obesity (BMI 30 0-34 9) 9/9/2019   • Orthostatic hypotension 10/25/2020   • PONV (postoperative nausea and vomiting) 1/26/2018   • Protein-calorie malnutrition ([address] ) 11/23/2020   • Recurrent peritonitis ([address] ) due to peritoneal dialysis catheter 7/31/2020   • Retinopathy    • Seizures ([address] )     early 2020 - one time   • Skin abnormality     some dime size areas where skin was scratched from itching   • Spontaneous bacterial peritonitis ([address] ) 10/19/2020   • Stroke ([address] )     x2 - off balance/no driving/fatigue   • Swelling of both lower extremities    • Vomiting    • Wears glasses        Past Surgical History:   Procedure Laterality Date   • CARDIAC LOOP RECORDER  05/2018   • COLONOSCOPY     • EGD     • EYE SURGERY Right    • IR AV FISTULAGRAM/GRAFTOGRAM  2/23/2021   • IR TUNNELED CENTRAL LINE PLACEMENT  2/16/2021   • IR TUNNELED DIALYSIS CATHETER PLACEMENT  11/18/2020   • IR TUNNELED DIALYSIS CATHETER REMOVAL  2/12/2021   • IR TUNNELED DIALYSIS CATHETER REMOVAL  3/11/2021   • PERITONEAL CATHETER INSERTION N/A 2018    Procedure: UNROOF PD CATHETER;  Surgeon: Viktoriya Salazar DO;  [facility];  Service: General   • AR ANASTOMOSIS,AV,ANY SITE Left 2020    Procedure: CREATION FISTULA  ARTERIOVENOUS (AV) - LEFT WRIST;  Surgeon: Tristen Ruvalcaba MD;  [facility];  Service: Vascular   • AR ESOPHAGOGASTRODUODENOSCOPY TRANSORAL DIAGNOSTIC N/A 2019    Procedure: ESOPHAGOGASTRODUODENOSCOPY (EGD); Surgeon: Agusto Rosenbaum MD;  Location: [facility]; Service: Gastroenterology   • AR LAP INSERTION TUNNELED INTRAPERITONEAL CATHETER N/A 2018    Procedure: LAPAROSCOPIC PD CATHETER PLACEMENT;  Surgeon: Viktoriya Salazar DO;  [facility];  Service: General   • AR REMOVAL TUNNELED INTRAPERITONEAL CATHETER N/A 2020    Procedure: REMOVAL CATHETER PERITONEAL DIALYSIS;  Surgeon: Taylor Wilkins MD;  [facility];  Service: General   • TONSILLECTOMY     • UPPER GASTROINTESTINAL ENDOSCOPY         Family History   Problem Relation Age of Onset   • Breast cancer Mother    • Hypertension Mother    • Hyperlipidemia Father    • Hypertension Father    • Leukemia Maternal Grandmother    • Hyperlipidemia Maternal Grandfather    • Hypertension Maternal Grandfather    • Hyperlipidemia Paternal Grandmother    • Hypertension Paternal Grandmother    • Heart disease Paternal Grandfather         cardiac disorder   • Diabetes Paternal Grandfather      I have reviewed and agree with the history as documented      E-Cigarette/Vaping   • E-Cigarette Use Current Every Day User    • Comments medical marijuana      E-Cigarette/Vaping Substances   • Nicotine No    • THC Yes    • CBD Yes    • Flavoring No    • Other No    • Unknown No      Social History     Tobacco Use   • Smoking status: Former Smoker     Packs/day: 0 50     Years: 12 00     Pack years: 6 00     Types: Cigarettes     Quit date: 2018     Years since quittin 6   • Smokeless tobacco: Never Used   • Tobacco comment: quit 2018   Vaping Use   • Vaping Use: Every day   • Substances: THC, CBD   Substance Use Topics   • Alcohol use: Yes     Comment: occassionally   • Drug use: Yes     Types: Marijuana     Comment: medical marijuana        Review of Systems   Constitutional: Negative  HENT: Negative  Respiratory: Negative  Cardiovascular: Negative  Gastrointestinal: Negative  Endocrine: Negative  Genitourinary: Negative  Musculoskeletal: Negative  Allergic/Immunologic: Negative  Neurological: Negative  Hematological: Negative  Psychiatric/Behavioral: Negative  Physical Exam  ED Triage Vitals [10/11/22 2032]   Temperature Pulse Respirations Blood Pressure SpO2   97 7 °F (36 5 °C) 80 16 144/78 97 %      Temp Source Heart Rate Source Patient Position - Orthostatic VS BP Location FiO2 (%)   Temporal Monitor Sitting Right arm --      Pain Score       --             Orthostatic Vital Signs  Vitals:    10/11/22 2032   BP: 144/78   Pulse: 80   Patient Position - Orthostatic VS: Sitting       Physical Exam  HENT:      Head: Normocephalic and atraumatic  Nose: Nose normal       Mouth/Throat:      Mouth: Mucous membranes are moist    Eyes:      General: No scleral icterus  Extraocular Movements: Extraocular movements intact  Cardiovascular:      Rate and Rhythm: Normal rate  Pulses: Normal pulses  Pulmonary:      Effort: Pulmonary effort is normal    Abdominal:      Palpations: Abdomen is soft  Musculoskeletal:         General: Normal range of motion  Cervical back: Normal range of motion  Skin:     Capillary Refill: Capillary refill takes less than 2 seconds  Comments: Round, Non painful, skin colored mole with even border, about 8 mm in diameter with crater-like central invagination filled with dry blood  No erythema  No swelling  No discharge noticed  Non itchy   Neurological:      General: No focal deficit present  Mental Status: He is alert     Psychiatric:         Mood and Affect: Mood normal          ED Medications  Medications - No data to display    Diagnostic Studies  Results Reviewed     None                 No orders to display         Procedures  Procedures      ED Course     MDM  Number of Diagnoses or Management Options    A 41 yo male present with mole with dry blood on the left temporal area  States that it's been present for a long time but recently got more elevated  Pt used neosporin ointment without relief  On examination skin lesion is not erythematous, no discharge noticed, non painful, no itching  Low suspicion for infection  Dry blood noticed in the middle of the lesion  Mechanically irritated mole vs basal cell carcinoma  No acute management is needed  Skin lesion was cleaned with alcohol  Pt was instructed to avoid scratching and it was recommended to make appointment with dermatology for further evaluation with biopsy to rule out basal            Disposition  Final diagnoses:   Skin mole     Time reflects when diagnosis was documented in both MDM as applicable and the Disposition within this note     Time User Action Codes Description Comment    10/11/2022  8:58 PM Shama Costa Add [D22 9] Skin mole       ED Disposition     ED Disposition   Discharge    Condition   Stable    Date/Time   Tue Oct 11, 2022  8:56 PM    Comment   Des Mckeon discharge to home/self care  Follow-up Information     Follow up With Specialties Details Why Contact Info Additional Information    DERMATOLOGY Dermatology In 1 week for further evaluation of the mole, excision with biopsy to rule out basal cell carcinoma [id number]  [address] [place] [address] Dermatology Schedule an appointment as soon as possible for a visit  Follow up in 1 week  Why: for further evaluation of the mole, excision with biopsy to rule out basal cell carcinoma   [address] [address] [phone] [address] [facility] 408 [id number]  [address] [place] [address], [address] [facility] [address] [place], [place], [address] [phone]    [facility] Emergency Department Emergency Medicine  if you see dishcarge, pus, or bleeding, or fever [address] Emergency Department, [address], [place], [place], [address]          Patient's Medications   Discharge Prescriptions    No medications on file     No discharge procedures on file  PDMP Review       Value Time User    PDMP Reviewed  Yes 7/15/2022  4:20 AM Reji Joe MD           ED Provider  Attending physically available and evaluated Katherine Aguilar  JASPREET managed the patient along with the ED Attending      Electronically Signed by         Shayla Mancuso MD  10/11/22 2112

## 2022-10-12 NOTE — TELEPHONE ENCOUNTER
Pt called in regards to schedule an appointment with Dr Mckenzie Coley but there is no openings  Pt stated he changed the days he received dialysis to be able to schedule the appt  Pt available days are Monday, Wednesday and Friday  Pt asked if is possible to be seen by another Dr Dawood Maria told pt that a note will be send to the  and

## 2022-10-13 ENCOUNTER — TELEPHONE (OUTPATIENT)
Dept: PSYCHIATRY | Facility: CLINIC | Age: 37
End: 2022-10-13

## 2022-10-13 NOTE — TELEPHONE ENCOUNTER
LVM for patient to contact the office to explain previous notes from provider as well as confirming interest with trying to be seen by a different provider

## 2022-10-13 NOTE — TELEPHONE ENCOUNTER
Patient contacted the office stating he could not accept the appointment on 10/13/22 at 11:00a due to another appointment  Patient inquired about meeting with a different provider that could accommodate with his schedule  Patient explained that he has dialysis appointments and that he can only meet on certain days due to that time conflict

## 2022-10-14 ENCOUNTER — OFFICE VISIT (OUTPATIENT)
Dept: DERMATOLOGY | Facility: CLINIC | Age: 37
End: 2022-10-14

## 2022-10-14 VITALS — WEIGHT: 215 LBS | TEMPERATURE: 98.1 F | BODY MASS INDEX: 30.1 KG/M2 | HEIGHT: 71 IN

## 2022-10-14 DIAGNOSIS — L28.1 PRURIGO NODULARIS: ICD-10-CM

## 2022-10-14 DIAGNOSIS — D48.5 NEOPLASM OF UNCERTAIN BEHAVIOR OF SKIN: Primary | ICD-10-CM

## 2022-10-14 DIAGNOSIS — T14.8XXA OPEN WOUND: ICD-10-CM

## 2022-10-14 NOTE — PATIENT INSTRUCTIONS
PRURIGO NODULARIS      Assessment and Plan:  Based on a thorough discussion of this condition and the management approach to it (including a comprehensive discussion of the known risks, side effects and potential benefits of treatment), the patient (family) agrees to implement the following specific plan:  Discussed treatment options   Start Triamcinolone 0 1% ointment twice a day for up to 14 days  Avoid groin, armpits and groin areas  It is important to take at least a 1 week break between 2 week uses  Discussed phototherapy- patient states this would be hard to coordinate for him with dialysis  Ask your Neuphrologist to consider to start medication Korsuva  Discussed possible dupixent in the future  Of note, patient has been on kidney transplant list for the last 4 years  Follow up in 2 months for prurigo nodularis follow-up and full skin check    What is nodular prurigo? Nodular prurigo is a skin condition characterised by very itchy firm lumps  It is the most severe form of prurigo  It is not known why these lumps appear  It is also called ‘prurigo nodularis’  Nodular prurigo is very difficult to treat effectively  Who gets nodular prurigo? Nodular prurigo can occur at all ages but mainly in adults aged 19-56 years  Both sexes are equally affected  Up to 80% of patients have a personal or family history of atopic dermatitis, asthma or hay fever (compared to about 25% of the normal population)  It has been associated with internal disease including iron deficiency anaemia, chronic renal failure, gluten enteropathy, HIV infection and many other diverse conditions  What is the cause of nodular prurigo? The cause of nodular prurigo is unknown  It is uncertain whether scratching leads to the nodules or if the nodules appear before they are scratched  The reason for the nodules, the inflammation and the increased activity and size of nerves in the skin is under investigation but remains unknown    Nodular prurigo may commence as an insect bite reaction or another form of dermatitis  In some cases, nodular prurigo has been associated with brachioradial pruritus, which is due to compression or traction of spinal nerves  This theory may explain why local treatment is not always successful  It has been speculated that widespread nodular prurigo may also follow sensitisation of the spinal nerves and that the nodules appear because of scratching  What are the clinical features of nodular prurigo? The individual prurigo nodule is a firm lump, 1-3 cm in diameter, often with a raised warty surface  The early lesion may start as a smaller red itchy bump  Crusting and scaling may cover recently scratched lesions  Older lesions may be darker or paler than surrounding skin  The skin in between the nodules is often dry  The itch is often very intense, often for hours on end, leading to vigorous scratching and sometimes secondary infection  Nodular prurigo lesions are usually grouped and numerous but may vary in number from 2-200  Nodular prurigo tends to be symmetrically distributed  They usually start on the lower arms and legs and are worse on the outer aspects  The trunk, face and even palms can also be affected  Sometimes the prurigo nodules are most obvious on the cape area (neck, shoulders and upper arms)  New nodules appear from time to time, but existing nodules may regress spontaneously to leave scars  Nodular prurigo often runs a long course and can lead to significant stress and depression  How is nodular prurigo diagnosed? In most cases, the diagnosis is made clinically due to the degree of itch and the typical appearance of the nodules  A skin biopsy may be useful to confirm the diagnosis of nodular prurigo  Under the microscope, the skin is enormously thickened and may appear quite abnormal, sometimes resembling squamous cell skin cancer   The nerve fibres and nerve endings in the skin are markedly increased in size  The skin is inflamed and there is an increased number of neural mediators known to cause itching and nerve growth  Direct immunofluorescence looking for antibody deposition in the skin is usually negative  Rarely, the blistering disease bullous pemphigoid can present as nodular prurigo (pemphigoid nodularis)  In this case, immunofluorescence reveals immunoglobulins in the basement membrane zone below the epidermis  The prurigo nodules can be present for weeks or months before any blisters appear  It is important to identify underlying diseases that are associated with nodular prurigo; blood tests may include full blood count, liver, kidney and thyroid function tests  Patch testing may be worthwhile if contact allergy is considered possible  What is the treatment for nodular prurigo? Unfortunately, nodular prurigo is one of the more resistant conditions skin specialists are called upon to treat  Local treatments that may be tried include:  Emollients applied liberally and frequently to cool and soothe itchy skin - menthol or phenol may be added  Oral antihistamines at night to reduce itch and allow sleep  Ultrapotent topical steroid creams  To enhance their effect, apply under occlusion; cover with a plastic or hydrocolloid adhesive dressing and leave this in place for several days  Corticosteroid injections (triamcinolone acetonide 10-40 mg/mL) into thicker nodules  Coal tar ointment as a steroid alternative  Calcipotriol ointment (topical vitamin D3), usually used for psoriasis, can be applied twice daily  Capsaicin cream, which induces itching and burning until eventually, the itch stops completely -- it requires repeated applications four to six times daily  Cryotherapy, which may shrink the nodules and reduce their itch  Treatments with pulsed dye laser, which may reduce the vascularity of individual lesions      Antiseptic or antibiotic ointment may be used on infected nodules, and oral antibiotics (usually flucloxacillin) are indicated for significant secondary bacterial infection (cellulitis)  Systemic treatments that may be helpful for more severe disease are listed below, in no particular order  Combination treatment is frequently recommended  Phototherapy (UVB and PUVA)   Tricyclic antidepressants such as amitriptyline or doxepin   Anticonvulsants used for neuropathic pain and itch, such as gabapentin or pregabalin   Naltrexone, an opiate antagonist (this counteracts the narcotic effect of morphine, heroin and similar drugs), has been reported to reduce itching in some subjects  Oral steroids   Methotrexate   Thalidomide, which is reserved for very severe cases and may be difficult to access  Ciclosporin, which may reduce the lumps and the itching but its use is limited by side effects  Systemic retinoids such as acitretin or isotretinoin, which may shrink the nodules and reduce the severity of the itch  WOUND OF RIGHT HAND    Assessment and Plan:  Based on a thorough discussion of this condition and the management approach to it (including a comprehensive discussion of the known risks, side effects and potential benefits of treatment), the patient (family) agrees to implement the following specific plan:  Start Mupirocin 2% ointment three times a day until the wound it heals  NEOPLASM OF UNCERTAIN BEHAVIOR OF SKIN      INFORMED CONSENT DISCUSSION AND POST-OPERATIVE INSTRUCTIONS FOR PATIENT    I   RATIONALE FOR PROCEDURE  I understand that a skin biopsy allows the Dermatologist to test a lesion or rash under the microscope to obtain a diagnosis  It usually involves numbing the area with numbing medication and removing a small piece of skin; sometimes the area will be closed with sutures  In this specific procedure, sutures are not usually needed    If any sutures are placed, then they are usually need to be removed in 2 weeks or less     I understand that my Dermatologist recommends that a skin "shave" biopsy be performed today  A local anesthetic, similar to the kind that a dentist uses when filling a cavity, will be injected with a very small needle into the skin area to be sampled  The injected skin and tissue underneath "will go to sleep” and become numb so no pain should be felt afterwards  An instrument shaped like a tiny "razor blade" (shave biopsy instrument) will be used to cut a small piece of tissue and skin from the area so that a sample of tissue can be taken and examined more closely under the microscope  A slight amount of bleeding will occur, but it will be stopped with direct pressure and a pressure bandage and any other appropriate methods  I understands that a scar will form where the wound was created  Surgical ointment will be applied to help protect the wound  Sutures are not usually needed  II   RISKS AND POTENTIAL COMPLICATIONS   I understand the risks and potential complications of a skin biopsy include but are not limited to the following:  Bleeding  Infection  Pain  Scar/keloid  Skin discoloration  Incomplete Removal  Recurrence  Nerve Damage/Numbness/Loss of Function  Allergic Reaction to Anesthesia  Biopsies are diagnostic procedures and based on findings additional treatment or evaluation may be required  Loss or destruction of specimen resulting in no additional findings    My Dermatologist has explained to me the nature of the condition, the nature of the procedure, and the benefits to be reasonably expected compared with alternative approaches  My Dermatologist has discussed the likelihood of major risks or complications of this procedure including the specific risks listed above, such as bleeding, infection, and scarring/keloid  I understand that a scar is expected after this procedure    I understand that my physician cannot predict if the scar will form a "keloid," which extends beyond the borders of the wound that is created  A keloid is a thick, painful, and bumpy scar  A keloid can be difficult to treat, as it does not always respond well to therapy, which includes injecting cortisone directly into the keloid every few weeks  While this usually reduces the pain and size of the scar, it does not eliminate it  I understand that photographs may be taken before and after the procedure  These will be maintained as part of the medical providers confidential records and may not be made available to me  I further authorize the medical provider to use the photographs for teaching purposes or to illustrate scientific papers, books, or lectures if in his/her judgment, medical research, education, or science may benefit from its use  I have had an opportunity to fully inquire about the risks and benefits of this procedure and its alternatives  I have been given ample time and opportunity to ask questions and to seek a second opinion if I wished to do so  I acknowledge that there have specifically been no guarantees as to the cosmetic results from the procedure  I am aware that with any procedure there is always the possibility of an unexpected complication  III  POST-PROCEDURAL CARE (WHAT YOU WILL NEED TO DO "AFTER THE BIOPSY" TO OPTIMIZE HEALING)    Keep the area clean and dry  Try NOT to remove the bandage or get it wet for the first 24 hours  Gently clean the area and apply surgical ointment (such as Vaseline petrolatum ointment, which is available "over the counter" and not a prescription) to the biopsy site for up to 2 weeks straight  This acts to protect the wound from the outside world  Sutures are not usually placed in this procedure  If any sutures were placed, return for suture removal as instructed (generally 1 week for the face, 2 weeks for the body)  Take Acetaminophen (Tylenol) for discomfort, if no contraindications    Ibuprofen or aspirin could make bleeding worse     Call our office immediately for signs of infection: fever, chills, increased redness, warmth, tenderness, discomfort/pain, or pus or foul smell coming from the wound  WHAT TO DO IF THERE IS ANY BLEEDING? If a small amount of bleeding is noticed, place a clean cloth over the area and apply firm pressure for ten minutes  Check the wound after 10 minutes of direct pressure  If bleeding persists, try one more time for an additional 10 minutes of direct pressure on the area  If the bleeding becomes heavier or does not stop after the second attempt, or if you have any other questions about this procedure, then please call your [facility]'s Dermatologist by calling [phone] (SKIN)  I hereby acknowledge that I have reviewed and verified the site with my Dermatologist and have requested and authorized my Dermatologist to proceed with the procedure

## 2022-10-14 NOTE — PROGRESS NOTES
[facility] Note     Patient Name: Odell Diego  Encounter Date: 10/14/2022    • Have you been cared for by a [facility] Dermatologist in the last 3 years and, if so, which one? No    · Have you traveled outside of the [address] [place] [address] in the past 3 months or outside of the [facility] area in the last 2 weeks? No    • May we call your Preferred Phone number to discuss your specific medical information? Yes    • May we leave a detailed message that includes your specific medical information? Yes      Today's Chief Concerns:  Concern #1:  Spot of concern on temple    Past Medical History:  Have you personally ever had or currently have any of the following? · Skin cancer (such as Melanoma, Basal Cell Carcinoma, Squamous Cell Carcinoma? (If Yes, please provide more detail)- No  · Eczema: No  · Psoriasis: No  · HIV/AIDS: No  · Hepatitis B or C: No  · Tuberculosis: No  · Systemic Immunosuppression such as Diabetes, Biologic or Immunotherapy, Chemotherapy, Organ Transplantation, Bone Marrow Transplantation (If YES, please provide more detail): Yes, Diabetes Type 1  · Radiation Treatment (If YES, please provide more detail): No  · Any other major medical conditions/concerns? (If Yes, which types)-Yes, Gatroperisis, had 2 strokes     Social History:    • What is/was your primary occupation? Disability     • What are your hobbies/past-times? Spending time with family     Family History:  Have any of your "first degree relatives" (parent, brother, sister, or child) had any of the following       · Skin cancer such as Melanoma or Merkel Cell Carcinoma or Pancreatic Cancer? No  · Eczema, Asthma, Hay Fever or Seasonal Allergies: No  · Psoriasis or Psoriatic Arthritis: No  · Do any other medical conditions seem to run in your family? If Yes, what condition and which relatives?   No    Current Medications:         Current Outpatient Medications:   •  aspirin (ECOTRIN LOW STRENGTH) 81 mg EC tablet, Take 81 mg by mouth daily, Disp: , Rfl:   •  atorvastatin (LIPITOR) 40 mg tablet, Take 1 tablet (40 mg total) by mouth every evening, Disp: 90 tablet, Rfl: 3  •  b complex vitamins capsule, Take 1 capsule by mouth daily before lunch , Disp: , Rfl:   •  B-D ULTRAFINE III SHORT PEN 31G X 8 MM MISC, USE 1 PEN NEEDLE 8 TIMES DAILY, Disp: 100 each, Rfl: 47  •  calcium acetate (PHOSLO) capsule, TAKE 3 CAPSULES BY MOUTH WITH MEALS, Disp: , Rfl:   •  cholecalciferol (VITAMIN D3) 1,000 units tablet, Take 2,000 Units by mouth daily  , Disp: , Rfl:   •  cinacalcet (SENSIPAR) 60 MG tablet, Take 120 mg by mouth daily 2 tab daily , Disp: , Rfl:   •  cloNIDine (CATAPRES-TTS-3) 0 3 mg/24 hr, 1 patch once a week , Disp: , Rfl:   •  escitalopram (LEXAPRO) 20 mg tablet, Take 1 tablet (20 mg total) by mouth daily, Disp: 90 tablet, Rfl: 2  •  famotidine (PEPCID) 40 MG tablet, Take 1 tablet (40 mg total) by mouth daily, Disp: 30 tablet, Rfl: 0  •  gabapentin (NEURONTIN) 100 mg capsule, Take 2 tablets at bedtime, Disp: 60 capsule, Rfl: 5  •  hydrOXYzine HCL (ATARAX) 10 mg tablet, Take 1 tablet (10 mg total) by mouth every 6 (six) hours as needed for itching, Disp: 30 tablet, Rfl: 0  •  insulin glargine (Basaglar KwikPen) 100 units/mL injection pen, Inject 7 Units under the skin daily at bedtime, Disp: 3 mL, Rfl: 0  •  labetalol (NORMODYNE) 200 mg tablet, TAKE 2 TABLETS BY MOUTH THREE TIMES DAILY, Disp: 180 tablet, Rfl: 0  •  NIFEdipine ER (ADALAT CC) 60 MG 24 hr tablet, Take 1 tablet (60 mg total) by mouth 2 (two) times a day, Disp: 180 tablet, Rfl: 0  •  NovoLOG FlexPen ReliOn 100 UNIT/ML injection pen, , Disp: , Rfl:   •  traZODone (DESYREL) 50 mg tablet, Take 0 5 tablets (25 mg total) by mouth daily at bedtime as needed for sleep, Disp: 30 tablet, Rfl: 0  •  doxazosin (CARDURA) 2 mg tablet, TAKE 2 TABLETS BY MOUTH IN THE MORNING AND 2 IN THE EVENING (Patient not taking: No sig reported), Disp: 120 tablet, Rfl: 0  •  GLUCAGON EMERGENCY 1 MG injection, INJECT 1MG SUBCUTANEOUSLY AS NEEDED (AS DIRECTED)  MAY REPEAT DOSE EVERY 20 MINUTES AS NEEDED (Patient not taking: Reported on 10/14/2022), Disp: , Rfl: 3  •  Gvoke HypoPen 1-Pack 1 MG/0 2ML SOAJ, INJECT 1MG UNDER THE SKIN ONE TIME AS NEEDED (Patient not taking: Reported on 10/14/2022), Disp: , Rfl:   •  levothyroxine 25 mcg tablet, Take 1 tablet (25 mcg total) by mouth daily (Patient not taking: No sig reported), Disp: 30 tablet, Rfl: 1  •  lisinopril (ZESTRIL) 40 mg tablet, Take 80 mg by mouth daily (Patient not taking: No sig reported), Disp: , Rfl:   •  metolazone (ZAROXOLYN) 10 mg tablet, Take 5 mg by mouth daily   (Patient not taking: No sig reported), Disp: , Rfl:   •  minoxidil (LONITEN) 2 5 mg tablet, Take 2 5 mg by mouth 2 (two) times a day  (Patient not taking: No sig reported), Disp: , Rfl:   •  ofloxacin (FLOXIN) 0 3 % otic solution, Administer 10 drops to the right ear daily (Patient not taking: Reported on 10/14/2022), Disp: 5 mL, Rfl: 0  •  ondansetron (ZOFRAN) 4 mg tablet, TAKE 1 TABLET BY MOUTH EVERY 8 HOURS AS NEEDED FOR NAUSEA FOR VOMITING (Patient not taking: Reported on 10/14/2022), Disp: 90 tablet, Rfl: 0  •  Patiromer Sorbitex Calcium (VELTASSA PO), Take 5 g by mouth once a week (Patient not taking: Reported on 10/14/2022), Disp: , Rfl:   •  torsemide (DEMADEX) 100 mg tablet, Take 100 mg by mouth daily  , Disp: , Rfl:       Review of Systems:  Have you recently had or currently have any of the following? If YES, what are you doing for the problem? · Fever, chills or unintended weight loss: No  · Sudden loss or change in your vision: No  · Nausea, vomiting or blood in your stool: No  · Painful or swollen joints: No  · Wheezing or cough: No  · Changing mole or non-healing wound: No  · Nosebleeds: No  · Excessive sweating: No  · Easy or prolonged bleeding?   No  · Over the last 2 weeks, how often have you been bothered by the following problems? · Taking little interest or pleasure in doing things: 1 - Not at All  · Feeling down, depressed, or hopeless: 1 - Not at All  · Rapid heartbeat with epinephrine:  No    · FEMALES ONLY:    · Are you pregnant or planning to become pregnant? N/A  · Are you currently or planning to be nursing or breast feeding? N/A    · Any known allergies? Allergies   Allergen Reactions   • Sulfa Antibiotics Rash         Physical Exam:    • Was a chaperone (Derm Clinical Assistant) present throughout the entire Physical Exam? Yes    • Did the Dermatology Team specifically  the patient on the importance of a Full Skin Exam to be sure that nothing is missed clinically? Yes  o Did the patient ultimately request or accept a Full Skin Exam?  NO      CONSTITUTIONAL:   Vitals:    10/14/22 1435   Temp: 98 1 °F (36 7 °C)   TempSrc: Temporal   Weight: 97 5 kg (215 lb)   Height: 5' 11" (1 803 m)         PSYCH: Normal mood and affect  EYES: Normal conjunctiva  ENT: Normal lips and oral mucosa  CARDIOVASCULAR: No edema  RESPIRATORY: Normal respirations  HEME/LYMPH/IMMUNO:  No regional lymphadenopathy except as noted below in "ASSESSMENT AND PLAN BY DIAGNOSIS"    SKIN:  FULL ORGAN SYSTEM EXAM  Hair, Scalp, Ears, Face Normal except as noted below in Assessment   Right Arm/Hand/Fingers Normal except as noted below in Assessment   Left Arm/Hand/Fingers Normal except as noted below in Assessment   Chest/Breasts Viewed areas Normal except as noted below in Assessment   Abdomen, Umbilicus Normal except as noted below in Assessment   Back/Spine Normal except as noted below in Assessment   Groin/Genitalia/Buttocks NOT EXAMINED   Right Leg, Normal except as noted below in Assessment   Left Leg Normal except as noted below in Assessment        Assessment and Plan by Diagnosis:    History of Present Condition:    • Duration:  How long has this been an issue for you?     •  Had mole for many years  • Location Affected:  Where on the body is this affecting you? o  Left temple   • Quality:  Is there any bleeding, pain, itch, burning/irritation, or redness associated with the skin lesion?    o  raised   • Severity:  Describe any bleeding, pain, itch, burning/irritation, or redness on a scale of 1 to 10 (with 10 being the worst)  o  0  • Timing:  Does this condition seem to be there pretty constantly or do you notice it more at specific times throughout the day?    o  Constantly   • Context:  Have you ever noticed that this condition seems to be associated with specific activities you do?    o  unknown   • Modifying Factors:    o Anything that seems to make the condition worse?    -  unknown   o What have you tried to do to make the condition better? -  Over the counter Neosporin   • Associated Signs and Symptoms:  Does this skin lesion seem to be associated with any of the following:  o  Denies        PRURIGO NODULARIS  CHRONIC KIDNEY DISEASE INDUCED PRURITUS    Physical Exam:  • Anatomic Location Affected:  Arms, legs, chest, scalp  • Morphological Description:  Scattered erosions, scars, linear erythema    Additional History of Present Condition:  Very itchy for the last 20+ years due to being a type 1 diabetic  Is significantly affecting his personal life  Assessment and Plan:  Based on a thorough discussion of this condition and the management approach to it (including a comprehensive discussion of the known risks, side effects and potential benefits of treatment), the patient (family) agrees to implement the following specific plan:    • Discussed treatment options   • Start Triamcinolone 0 1% ointment twice a day for up to 14 days  Avoid groin, armpits and groin areas  It is important to take at least a 1 week break between 2 week uses     • Discussed phototherapy- patient states this would be hard to coordinate for him with dialysis  • Ask your Neuphrologist to consider to start medication Korsuva  • Discussed possible dupixent in the future  • Of note, patient has been on kidney transplant list for the last 4 years  • Follow up in 2 months for prurigo nodularis follow-up and full skin check    What is nodular prurigo? Nodular prurigo is a skin condition characterised by very itchy firm lumps  It is the most severe form of prurigo  It is not known why these lumps appear  It is also called ‘prurigo nodularis’  Nodular prurigo is very difficult to treat effectively  Who gets nodular prurigo? Nodular prurigo can occur at all ages but mainly in adults aged 19-56 years  Both sexes are equally affected  Up to 80% of patients have a personal or family history of atopic dermatitis, asthma or hay fever (compared to about 25% of the normal population)  It has been associated with internal disease including iron deficiency anaemia, chronic renal failure, gluten enteropathy, HIV infection and many other diverse conditions  What is the cause of nodular prurigo? The cause of nodular prurigo is unknown  It is uncertain whether scratching leads to the nodules or if the nodules appear before they are scratched  The reason for the nodules, the inflammation and the increased activity and size of nerves in the skin is under investigation but remains unknown  Nodular prurigo may commence as an insect bite reaction or another form of dermatitis  In some cases, nodular prurigo has been associated with brachioradial pruritus, which is due to compression or traction of spinal nerves  This theory may explain why local treatment is not always successful  It has been speculated that widespread nodular prurigo may also follow sensitisation of the spinal nerves and that the nodules appear because of scratching  What are the clinical features of nodular prurigo? The individual prurigo nodule is a firm lump, 1-3 cm in diameter, often with a raised warty surface  The early lesion may start as a smaller red itchy bump   Crusting and scaling may cover recently scratched lesions  Older lesions may be darker or paler than surrounding skin  The skin in between the nodules is often dry  The itch is often very intense, often for hours on end, leading to vigorous scratching and sometimes secondary infection  Nodular prurigo lesions are usually grouped and numerous but may vary in number from 2-200  Nodular prurigo tends to be symmetrically distributed  They usually start on the lower arms and legs and are worse on the outer aspects  The trunk, face and even palms can also be affected  Sometimes the prurigo nodules are most obvious on the cape area (neck, shoulders and upper arms)  New nodules appear from time to time, but existing nodules may regress spontaneously to leave scars  Nodular prurigo often runs a long course and can lead to significant stress and depression  How is nodular prurigo diagnosed? In most cases, the diagnosis is made clinically due to the degree of itch and the typical appearance of the nodules  A skin biopsy may be useful to confirm the diagnosis of nodular prurigo  Under the microscope, the skin is enormously thickened and may appear quite abnormal, sometimes resembling squamous cell skin cancer  The nerve fibres and nerve endings in the skin are markedly increased in size  The skin is inflamed and there is an increased number of neural mediators known to cause itching and nerve growth  Direct immunofluorescence looking for antibody deposition in the skin is usually negative  Rarely, the blistering disease bullous pemphigoid can present as nodular prurigo (pemphigoid nodularis)  In this case, immunofluorescence reveals immunoglobulins in the basement membrane zone below the epidermis  The prurigo nodules can be present for weeks or months before any blisters appear  It is important to identify underlying diseases that are associated with nodular prurigo; blood tests may include full blood count, liver, kidney and thyroid function tests  Patch testing may be worthwhile if contact allergy is considered possible  What is the treatment for nodular prurigo? Unfortunately, nodular prurigo is one of the more resistant conditions skin specialists are called upon to treat  Local treatments that may be tried include:  • Emollients applied liberally and frequently to cool and soothe itchy skin - menthol or phenol may be added  • Oral antihistamines at night to reduce itch and allow sleep  • Ultrapotent topical steroid creams  To enhance their effect, apply under occlusion; cover with a plastic or hydrocolloid adhesive dressing and leave this in place for several days  • Corticosteroid injections (triamcinolone acetonide 10-40 mg/mL) into thicker nodules  • Coal tar ointment as a steroid alternative  • Calcipotriol ointment (topical vitamin D3), usually used for psoriasis, can be applied twice daily  • Capsaicin cream, which induces itching and burning until eventually, the itch stops completely -- it requires repeated applications four to six times daily  • Cryotherapy, which may shrink the nodules and reduce their itch  • Treatments with pulsed dye laser, which may reduce the vascularity of individual lesions  •   Antiseptic or antibiotic ointment may be used on infected nodules, and oral antibiotics (usually flucloxacillin) are indicated for significant secondary bacterial infection (cellulitis)  Systemic treatments that may be helpful for more severe disease are listed below, in no particular order  Combination treatment is frequently recommended  • Phototherapy (UVB and PUVA)   • Tricyclic antidepressants such as amitriptyline or doxepin   • Anticonvulsants used for neuropathic pain and itch, such as gabapentin or pregabalin   • Naltrexone, an opiate antagonist (this counteracts the narcotic effect of morphine, heroin and similar drugs), has been reported to reduce itching in some subjects     • Oral steroids   • Methotrexate • Thalidomide, which is reserved for very severe cases and may be difficult to access  • Ciclosporin, which may reduce the lumps and the itching but its use is limited by side effects  • Systemic retinoids such as acitretin or isotretinoin, which may shrink the nodules and reduce the severity of the itch  OPEN WOUND  Physical Exam:  • Anatomic Location Affected:  Right dorsal of hand  • Morphological Description:  Eroded plaque    Additional History of Present Condition:  Patient states the area started to itch  He scratches the area until it open  He is covering the area with a band aid daily and has been using neosporin  Assessment and Plan:  Based on a thorough discussion of this condition and the management approach to it (including a comprehensive discussion of the known risks, side effects and potential benefits of treatment), the patient (family) agrees to implement the following specific plan:    • Start Mupirocin 2% ointment three times a day until the wound it heals  • Stop neosporin  NEOPLASM OF UNCERTAIN BEHAVIOR OF SKIN    Physical Exam:  • (Anatomic Location); (Size and Morphological Description); (Differential Diagnosis):    Specimen A: Left Temple; Skin; Shave Biopsy; 42year old male with 1 5 cm x 1 5 cm Pink plaque with central crater; Differential Diagnosis: Rule out Keratoacanthoma              Additional History of Present Condition:   Patient presents for an evaluation of a mole on the left temporal area  Patient states the first noticed a mole several years ago but that it accidentally got the top shaved off of it when he was getting his hair cut at a [employer]  He says it grew back  He scratched it causing it to bleed  Over the last couple of days he has noted that the borders of the mole have become elevated      Assessment and Plan:  • I have discussed with the patient that a sample of skin via a "skin biopsy” would be potentially helpful to further make a specific diagnosis under the microscope  • Based on a thorough discussion of this condition and the management approach to it (including a comprehensive discussion of the known risks, side effects and potential benefits of treatment), the patient (family) agrees to implement the following specific plan:    o Procedure:  Skin Biopsy  After a thorough discussion of treatment options and risk/benefits/alternatives (including but not limited to local pain, scarring, dyspigmentation, blistering, possible superinfection, and inability to confirm a diagnosis via histopathology), verbal and written consent were obtained and portion of the rash was biopsied for tissue sample  See below for consent that was obtained from patient and subsequent Procedure Note  PROCEDURE TANGENTIAL (SHAVE) BIOPSY NOTE:    • Performing Physician: Jitendra Mahajan  • Anatomic Location; Clinical Description with size (cm); Pre-Op Diagnosis:     Specimen A: Left Temple; Skin; Shave Biopsy; 42year old male with  1 5 cm x 1 5 cm Pink plaque with central crater; Differential Diagnosis: Rule out Keratoacanthoma    • Post-op diagnosis: Same     • Local anesthesia: 2% Lidocaine  HCL     • Topical anesthesia: None    • Hemostasis: Electrocautery      After obtaining informed consent  at which time there was a discussion about the purpose of biopsy  and low risks of infection and bleeding  The area was prepped and draped in the usual fashion  Anesthesia was obtained with 1% lidocaine with epinephrine  A shave biopsy to an appropriate sampling depth was obtained by Shave (Dermablade or 15 blade) The resulting wound was covered with surgical ointment and bandaged appropriately  The patient tolerated the procedure well without complications and was without signs of functional compromise  Specimen has been sent for review by Dermatopathology      Standard post-procedure care has been explained and has been included in written form within the patient's copy of Informed Consent  INFORMED CONSENT DISCUSSION AND POST-OPERATIVE INSTRUCTIONS FOR PATIENT    I   RATIONALE FOR PROCEDURE  I understand that a skin biopsy allows the Dermatologist to test a lesion or rash under the microscope to obtain a diagnosis  It usually involves numbing the area with numbing medication and removing a small piece of skin; sometimes the area will be closed with sutures  In this specific procedure, sutures are not usually needed  If any sutures are placed, then they are usually need to be removed in 2 weeks or less  I understand that my Dermatologist recommends that a skin "shave" biopsy be performed today  A local anesthetic, similar to the kind that a dentist uses when filling a cavity, will be injected with a very small needle into the skin area to be sampled  The injected skin and tissue underneath "will go to sleep” and become numb so no pain should be felt afterwards  An instrument shaped like a tiny "razor blade" (shave biopsy instrument) will be used to cut a small piece of tissue and skin from the area so that a sample of tissue can be taken and examined more closely under the microscope  A slight amount of bleeding will occur, but it will be stopped with direct pressure and a pressure bandage and any other appropriate methods  I understands that a scar will form where the wound was created  Surgical ointment will be applied to help protect the wound  Sutures are not usually needed      II   RISKS AND POTENTIAL COMPLICATIONS   I understand the risks and potential complications of a skin biopsy include but are not limited to the following:  • Bleeding  • Infection  • Pain  • Scar/keloid  • Skin discoloration  • Incomplete Removal  • Recurrence  • Nerve Damage/Numbness/Loss of Function  • Allergic Reaction to Anesthesia  • Biopsies are diagnostic procedures and based on findings additional treatment or evaluation may be required  • Loss or destruction of specimen resulting in no additional findings    My Dermatologist has explained to me the nature of the condition, the nature of the procedure, and the benefits to be reasonably expected compared with alternative approaches  My Dermatologist has discussed the likelihood of major risks or complications of this procedure including the specific risks listed above, such as bleeding, infection, and scarring/keloid  I understand that a scar is expected after this procedure  I understand that my physician cannot predict if the scar will form a "keloid," which extends beyond the borders of the wound that is created  A keloid is a thick, painful, and bumpy scar  A keloid can be difficult to treat, as it does not always respond well to therapy, which includes injecting cortisone directly into the keloid every few weeks  While this usually reduces the pain and size of the scar, it does not eliminate it  I understand that photographs may be taken before and after the procedure  These will be maintained as part of the medical providers confidential records and may not be made available to me  I further authorize the medical provider to use the photographs for teaching purposes or to illustrate scientific papers, books, or lectures if in his/her judgment, medical research, education, or science may benefit from its use  I have had an opportunity to fully inquire about the risks and benefits of this procedure and its alternatives  I have been given ample time and opportunity to ask questions and to seek a second opinion if I wished to do so  I acknowledge that there have specifically been no guarantees as to the cosmetic results from the procedure  I am aware that with any procedure there is always the possibility of an unexpected complication  III  POST-PROCEDURAL CARE (WHAT YOU WILL NEED TO DO "AFTER THE BIOPSY" TO OPTIMIZE HEALING)    • Keep the area clean and dry    Try NOT to remove the bandage or get it wet for the first 24 hours  • Gently clean the area and apply surgical ointment (such as Vaseline petrolatum ointment, which is available "over the counter" and not a prescription) to the biopsy site for up to 2 weeks straight  This acts to protect the wound from the outside world  • Sutures are not usually placed in this procedure  If any sutures were placed, return for suture removal as instructed (generally 1 week for the face, 2 weeks for the body)  • Take Acetaminophen (Tylenol) for discomfort, if no contraindications  Ibuprofen or aspirin could make bleeding worse  • Call our office immediately for signs of infection: fever, chills, increased redness, warmth, tenderness, discomfort/pain, or pus or foul smell coming from the wound  WHAT TO DO IF THERE IS ANY BLEEDING? If a small amount of bleeding is noticed, place a clean cloth over the area and apply firm pressure for ten minutes  Check the wound after 10 minutes of direct pressure  If bleeding persists, try one more time for an additional 10 minutes of direct pressure on the area  If the bleeding becomes heavier or does not stop after the second attempt, or if you have any other questions about this procedure, then please call your [facility] Dermatologist by calling [phone] (SKIN)  I hereby acknowledge that I have reviewed and verified the site with my Dermatologist and have requested and authorized my Dermatologist to proceed with the procedure  Scribe Attestation    I,:  Jw Clark am acting as a scribe while in the presence of the attending physician :       I,:  Danielle Wright MD personally performed the services described in this documentation    as scribed in my presence :         The patient was seen and discussed with Dr Danielle Wright       RTC: 2 months for prurigo nodularis follow-up and full body skin exam     Ravi Renee  Dermatology PGY-4 Resident Physician

## 2022-10-14 NOTE — TELEPHONE ENCOUNTER
Patient lvm returning writer's previous voicemail  Writer contacted the patient but the patient was busy at the time of the call  Patient stated they will call back later today when they are more available to speak

## 2022-10-17 ENCOUNTER — RA CDI HCC (OUTPATIENT)
Dept: OTHER | Facility: HOSPITAL | Age: 37
End: 2022-10-17

## 2022-10-17 NOTE — TELEPHONE ENCOUNTER
Patient returned call regarding previous conversations  Writer explained to patient that if he would like to meet with another provider, he would need to be placed on a waiting list due to other provider's full schedules  Writer confirmed such with our intake department  Writer attempted to inform patient that he can continue care with current provider and upon the provider's schedule becoming available, he will be contacted to schedule appointments  Patient hung up on writer before the call could be completed

## 2022-10-17 NOTE — PROGRESS NOTES
[address] [facility] DOS 5/3/22    N18 6  billed for DOS and noted on problem list  Z99 2 NOT billed on DOS, but noted on problem list

## 2022-10-31 DIAGNOSIS — C44.329 SQUAMOUS CELL CARCINOMA OF SKIN OF LEFT TEMPLE: Primary | ICD-10-CM

## 2022-11-07 ENCOUNTER — TELEPHONE (OUTPATIENT)
Dept: DERMATOLOGY | Facility: CLINIC | Age: 37
End: 2022-11-07

## 2022-11-07 ENCOUNTER — TELEPHONE (OUTPATIENT)
Dept: OTHER | Facility: OTHER | Age: 37
End: 2022-11-07

## 2022-11-10 NOTE — TELEPHONE ENCOUNTER
Pre- operative Mohs Telephone Scheduling Note    Do you have a pacemaker or defibrillator? Patient has a loop recorder     Do you take antibiotics before skin or dental procedures? no  If yes, will likely require pre-operative antibiotics  Ask  the patient why they take the antibiotics (usually because of joint replacement)  Do you have a history of a joint replacements within the past 2 years? no   If yes, will likely require pre-operative antibiotics  Ask if orthopaedic surgeon has prescribed pre-operative antibiotics to take before procedures/dental work? Do you take any OTC medications that thin your blood (Aspirin, Aleve, Ibuprofen) or supplements that thin your blood (fish oil, garlic, vitamin E, Ginko Biloba)? yes: ASA 81 mg     Do you take any prescribed medications that thin your blood (Coumadin, Plavix, Xarelto, Eliquis or another prescribed blood thinner)? no    Do you have an allergy to lidocaine or epinephrine? no    Do you have an allergy to shellfish? no    Do you smoke? no      If yes,  patient to try and stop 2 days before surgery and 7 days after the surgery  Minimizing smoking as much as possible during this time will improve healing and the cosmetic result after surgery  Date scheduled: 12/14/2022 @ 10 am with Dr Joe Valdez of Care with other provider (Suzie Hughes, ENT) required? no   IF YES, PLEASE FORWARD TO APPROPRIATE PERSONNEL TO HELP COORDINATE  Are there remaining tumors to be scheduled? no    Was Prior Authorization obtained?  No (please use  [email or web address] to document prior auth)

## 2022-11-21 DIAGNOSIS — K21.9 GASTROESOPHAGEAL REFLUX DISEASE WITHOUT ESOPHAGITIS: ICD-10-CM

## 2022-11-21 RX ORDER — FAMOTIDINE 40 MG/1
40 TABLET, FILM COATED ORAL DAILY
Qty: 90 TABLET | Refills: 1 | Status: SHIPPED | OUTPATIENT
Start: 2022-11-21

## 2022-11-22 ENCOUNTER — OFFICE VISIT (OUTPATIENT)
Dept: SLEEP CENTER | Facility: CLINIC | Age: 37
End: 2022-11-22

## 2022-11-22 VITALS
BODY MASS INDEX: 30.66 KG/M2 | HEIGHT: 71 IN | WEIGHT: 219 LBS | SYSTOLIC BLOOD PRESSURE: 122 MMHG | DIASTOLIC BLOOD PRESSURE: 70 MMHG

## 2022-11-22 DIAGNOSIS — G47.33 OSA (OBSTRUCTIVE SLEEP APNEA): Primary | ICD-10-CM

## 2022-11-22 NOTE — PROGRESS NOTES
Progress Note - Sleep Center   Mckenzie Vanessa ZSQ:[birth year] MRN: [id number]      Reason for Visit:  42 y o male here for PAP compliance check    Assessment:  Having difficulty with new PAP device  Sleep quality is unimproved  Compliance data show utilization for less than 70% of nights, for greater than or equal to 4 hours per night  The home care company has requested discontinuation of his device  Plan:  Discontinue APAP  The patient requires a positive airway pressure titration  I will order a split study and start the patient on that setting  The sleep technologist will help the patient to find a comfortable mask  Wear CPAP while awake  Follow up: One year    History of Present Illness:  History of RACHEAL on PAP therapy  Difficulty with compliance  Review of Systems      Genitourinary none  Cardiology none  Gastrointestinal none  Neurology numbness/tingling of an extremity and balance problems  Constitutional fatigue  Integumentary rash or dry skin  Psychiatry anxiety and depression  Musculoskeletal none  Pulmonary none  ENT none  Endocrine none  Hematological none      I have reviewed and updated the review of systems as necessary    Historical Information    Past Medical History:   Past Medical History:   Diagnosis Date   • Acute kidney injury ([facility] [address] )    • Ambulates with cane    • Anuria    • Anxiety    • Cellulitis of right elbow 3/31/2021   • Chronic kidney disease    • Depression    • Diabetes mellitus ([facility] [address] )    • Diarrhea    • Emesis 10/24/2020   • End stage renal disease ([facility] [address] ) 2/11/2018    Formatting of this note might be different from the original  Last Assessment & Plan:  Secondary to DM  On nightly PD  Followed by [facility]ro    Patient considering transplant for kidney and pancreas through [facility] Formatting of this note might be different from the original  Last Assessment & Plan:  Formatting of this note might be different from the original  Lab Results  Component Value Date   EGFR    • Falls    • Gastroparesis    • GERD (gastroesophageal reflux disease)    • History of shingles 2010   • History of transfusion 02/2018    no adverse reaction   • Hyperlipidemia    • Hyperphosphatemia    • Hypertension    • Itching    • Mastoiditis of right side 7/15/2022   • Muscle weakness     general unsteadiness   • Obesity (BMI 30 0-34 9) 9/9/2019   • Orthostatic hypotension 10/25/2020   • PONV (postoperative nausea and vomiting) 1/26/2018   • Protein-calorie malnutrition ([address] ) 11/23/2020   • Recurrent peritonitis ([address] ) due to peritoneal dialysis catheter 7/31/2020   • Retinopathy    • Seizures ([address] )     early 2020 - one time   • Skin abnormality     some dime size areas where skin was scratched from itching   • Spontaneous bacterial peritonitis ([address] ) 10/19/2020   • Stroke ([address] )     x2 - off balance/no driving/fatigue   • Swelling of both lower extremities    • Vomiting    • Wears glasses          Past Surgical History:   Past Surgical History:   Procedure Laterality Date   • CARDIAC LOOP RECORDER  05/2018   • COLONOSCOPY     • EGD     • EYE SURGERY Right    • IR AV FISTULAGRAM/GRAFTOGRAM  2/23/2021   • IR TUNNELED CENTRAL LINE PLACEMENT  2/16/2021   • IR TUNNELED DIALYSIS CATHETER PLACEMENT  11/18/2020   • IR TUNNELED DIALYSIS CATHETER REMOVAL  2/12/2021   • IR TUNNELED DIALYSIS CATHETER REMOVAL  3/11/2021   • PERITONEAL CATHETER INSERTION N/A 8/27/2018    Procedure: UNROOF PD CATHETER;  Surgeon: Cece Olmstead DO;  [facility];  Service: General   • ID ANASTOMOSIS,AV,ANY SITE Left 11/9/2020    Procedure: CREATION FISTULA  ARTERIOVENOUS (AV) - LEFT WRIST;  Surgeon: Obed Paige MD;  Location: [facility];  Service: Vascular   • ID ESOPHAGOGASTRODUODENOSCOPY TRANSORAL DIAGNOSTIC N/A 4/18/2019    Procedure: ESOPHAGOGASTRODUODENOSCOPY (EGD); Surgeon: Meaghan Goff MD;  Location: [facility];   Service: Gastroenterology   • ID LAP INSERTION TUNNELED INTRAPERITONEAL CATHETER N/A 8/6/2018    Procedure: LAPAROSCOPIC PD CATHETER PLACEMENT;  Surgeon: Haseeb Strauss DO;  [facility];  Service: General   • ME REMOVAL TUNNELED INTRAPERITONEAL CATHETER N/A 2020    Procedure: REMOVAL CATHETER PERITONEAL DIALYSIS;  Surgeon: Alyssa Pyle MD;  [facility];  Service: General   • TONSILLECTOMY     • UPPER GASTROINTESTINAL ENDOSCOPY           Social History:   Social History     Socioeconomic History   • Marital status: Single     Spouse name: Not on file   • Number of children: Not on file   • Years of education: Not on file   • Highest education level: Not on file   Occupational History   • Occupation:      Comment: [employer] office   Tobacco Use   • Smoking status: Former     Packs/day: 0 50     Years: 12 00     Pack years: 6 00     Types: Cigarettes     Quit date: 2018     Years since quittin 8   • Smokeless tobacco: Never   • Tobacco comments:     quit 2018   Vaping Use   • Vaping Use: Every day   • Substances: THC, CBD   Substance and Sexual Activity   • Alcohol use: Yes     Comment: occassionally   • Drug use: Yes     Types: Marijuana     Comment: medical marijuana   • Sexual activity: Not on file   Other Topics Concern   • Not on file   Social History Narrative    Caffeine use    single     Social Determinants of Health     Financial Resource Strain: Not on file   Food Insecurity: Not on file   Transportation Needs: Not on file   Physical Activity: Not on file   Stress: Not on file   Social Connections: Not on file   Intimate Partner Violence: Not on file   Housing Stability: Not on file         Family History:   Family History   Problem Relation Age of Onset   • Breast cancer Mother    • Hypertension Mother    • Hyperlipidemia Father    • Hypertension Father    • Leukemia Maternal Grandmother    • Hyperlipidemia Maternal Grandfather    • Hypertension Maternal Grandfather    • Hyperlipidemia Paternal Grandmother    • Hypertension Paternal Grandmother    • Heart disease Paternal Grandfather         cardiac disorder   • Diabetes Paternal Grandfather        Medications/Allergies:      Current Outpatient Medications:   •  aspirin (ECOTRIN LOW STRENGTH) 81 mg EC tablet, Take 81 mg by mouth daily, Disp: , Rfl:   •  atorvastatin (LIPITOR) 40 mg tablet, Take 1 tablet (40 mg total) by mouth every evening, Disp: 90 tablet, Rfl: 3  •  b complex vitamins capsule, Take 1 capsule by mouth daily before lunch , Disp: , Rfl:   •  B-D ULTRAFINE III SHORT PEN 31G X 8 MM MISC, USE 1 PEN NEEDLE 8 TIMES DAILY, Disp: 100 each, Rfl: 47  •  calcium acetate (PHOSLO) capsule, TAKE 3 CAPSULES BY MOUTH WITH MEALS, Disp: , Rfl:   •  cholecalciferol (VITAMIN D3) 1,000 units tablet, Take 2,000 Units by mouth daily  , Disp: , Rfl:   •  cinacalcet (SENSIPAR) 60 MG tablet, Take 120 mg by mouth daily 2 tab daily , Disp: , Rfl:   •  cloNIDine (CATAPRES-TTS-3) 0 3 mg/24 hr, 1 patch once a week , Disp: , Rfl:   •  escitalopram (LEXAPRO) 20 mg tablet, Take 1 tablet (20 mg total) by mouth daily, Disp: 90 tablet, Rfl: 2  •  famotidine (PEPCID) 40 MG tablet, Take 1 tablet (40 mg total) by mouth daily, Disp: 90 tablet, Rfl: 1  •  gabapentin (NEURONTIN) 100 mg capsule, Take 2 tablets at bedtime, Disp: 60 capsule, Rfl: 5  •  hydrOXYzine HCL (ATARAX) 10 mg tablet, Take 1 tablet (10 mg total) by mouth every 6 (six) hours as needed for itching, Disp: 30 tablet, Rfl: 0  •  insulin glargine (Basaglar KwikPen) 100 units/mL injection pen, Inject 7 Units under the skin daily at bedtime, Disp: 3 mL, Rfl: 0  •  labetalol (NORMODYNE) 200 mg tablet, TAKE 2 TABLETS BY MOUTH THREE TIMES DAILY, Disp: 180 tablet, Rfl: 0  •  mupirocin (BACTROBAN) 2 % ointment, Apply topically 3 (three) times a day Until lesion on right hand heals  , Disp: 30 g, Rfl: 4  •  NIFEdipine ER (ADALAT CC) 60 MG 24 hr tablet, Take 1 tablet (60 mg total) by mouth 2 (two) times a day, Disp: 180 tablet, Rfl: 0  •  NovoLOG FlexPen ReliOn 100 UNIT/ML injection pen, , Disp: , Rfl:   •  torsemide (DEMADEX) 100 mg tablet, Take 100 mg by mouth daily  , Disp: , Rfl:   •  traZODone (DESYREL) 50 mg tablet, Take 0 5 tablets (25 mg total) by mouth daily at bedtime as needed for sleep, Disp: 30 tablet, Rfl: 0  •  triamcinolone (KENALOG) 0 1 % ointment, Apply topically 2 (two) times a day For up to 14 days on the itchy areas  Avoid groin, underarms and face  It is important to take at least 1 week break between uses  , Disp: 454 g, Rfl: 0  •  doxazosin (CARDURA) 2 mg tablet, TAKE 2 TABLETS BY MOUTH IN THE MORNING AND 2 IN THE EVENING (Patient not taking: Reported on 8/25/2022), Disp: 120 tablet, Rfl: 0  •  GLUCAGON EMERGENCY 1 MG injection, INJECT 1MG SUBCUTANEOUSLY AS NEEDED (AS DIRECTED)   MAY REPEAT DOSE EVERY 20 MINUTES AS NEEDED (Patient not taking: Reported on 10/14/2022), Disp: , Rfl: 3  •  Gvoke HypoPen 1-Pack 1 MG/0 2ML SOAJ, INJECT 1MG UNDER THE SKIN ONE TIME AS NEEDED (Patient not taking: Reported on 10/14/2022), Disp: , Rfl:   •  levothyroxine 25 mcg tablet, Take 1 tablet (25 mcg total) by mouth daily (Patient not taking: Reported on 8/25/2022), Disp: 30 tablet, Rfl: 1  •  lisinopril (ZESTRIL) 40 mg tablet, Take 80 mg by mouth daily (Patient not taking: Reported on 8/25/2022), Disp: , Rfl:   •  metolazone (ZAROXOLYN) 10 mg tablet, Take 5 mg by mouth daily   (Patient not taking: Reported on 8/25/2022), Disp: , Rfl:   •  minoxidil (LONITEN) 2 5 mg tablet, Take 2 5 mg by mouth 2 (two) times a day  (Patient not taking: Reported on 8/25/2022), Disp: , Rfl:   •  ofloxacin (FLOXIN) 0 3 % otic solution, Administer 10 drops to the right ear daily (Patient not taking: Reported on 10/14/2022), Disp: 5 mL, Rfl: 0  •  ondansetron (ZOFRAN) 4 mg tablet, TAKE 1 TABLET BY MOUTH EVERY 8 HOURS AS NEEDED FOR NAUSEA FOR VOMITING (Patient not taking: Reported on 10/14/2022), Disp: 90 tablet, Rfl: 0  •  Patiromer Sorbitex Calcium (VELTASSA PO), Take 5 g by mouth once a week (Patient not taking: Reported on 10/14/2022), Disp: , Rfl:       Objective    Vital Signs: There were no vitals filed for this visit  [place] Sleepiness Scale: Total score: 10        Physical Exam:    General: Alert, appropriate, cooperative, overweight    Head: NC/AT    Skin: Warm, dry    Neuro: No motor abnormalities, cranial nerves appear intact    Extremity: No clubbing, cyanosis    PAP settin to 20 cm  DME Provider: [employer]  Test results:  AHI = 38 1, without significant hypoxia    Counseling / Coordination of Care  Total clinic time spent today 15 minutes  A description of the counseling / coordination of care: Discussed means to improve compliance  ALLI Pike    Board Certified Sleep Specialist

## 2022-11-23 ENCOUNTER — TELEPHONE (OUTPATIENT)
Dept: SLEEP CENTER | Facility: CLINIC | Age: 37
End: 2022-11-23

## 2022-11-23 LAB
DME PARACHUTE DELIVERY DATE REQUESTED: NORMAL
DME PARACHUTE ITEM DESCRIPTION: NORMAL
DME PARACHUTE ORDER STATUS: NORMAL
DME PARACHUTE SUPPLIER NAME: NORMAL
DME PARACHUTE SUPPLIER PHONE: NORMAL

## 2022-11-30 ENCOUNTER — HOSPITAL ENCOUNTER (EMERGENCY)
Facility: HOSPITAL | Age: 37
Discharge: HOME | End: 2022-11-30
Attending: EMERGENCY MEDICINE

## 2022-11-30 ENCOUNTER — APPOINTMENT (EMERGENCY)
Dept: RADIOLOGY | Facility: HOSPITAL | Age: 37
End: 2022-11-30

## 2022-11-30 VITALS
DIASTOLIC BLOOD PRESSURE: 89 MMHG | TEMPERATURE: 99.4 F | OXYGEN SATURATION: 98 % | SYSTOLIC BLOOD PRESSURE: 185 MMHG | RESPIRATION RATE: 18 BRPM | HEART RATE: 79 BPM

## 2022-11-30 DIAGNOSIS — B35.3 TINEA PEDIS OF BOTH FEET: Primary | ICD-10-CM

## 2022-11-30 DIAGNOSIS — S93.501A SPRAIN OF RIGHT GREAT TOE, INITIAL ENCOUNTER: ICD-10-CM

## 2022-11-30 RX ORDER — CLOTRIMAZOLE 1 %
CREAM (GRAM) TOPICAL
Qty: 15 G | Refills: 0 | Status: SHIPPED | OUTPATIENT
Start: 2022-11-30

## 2022-11-30 RX ORDER — NAPROXEN 500 MG/1
500 TABLET ORAL 2 TIMES DAILY WITH MEALS
Qty: 14 TABLET | Refills: 0 | Status: SHIPPED | OUTPATIENT
Start: 2022-11-30 | End: 2022-12-07

## 2022-11-30 NOTE — ED PROVIDER NOTES
History  Chief Complaint   Patient presents with   • Foot Pain      Patient states that he fell off of ladder  ( states it was about 3ft )  Injured right foot  Rates 4/10  Patient presents to the emergency room after falling off of 3 ft ladder after putting 3 Topper on history  He injured his right foot  He denies any headache head or neck injury  Complains of pain over the 1st metatarsal phalangeal joint  He denies any other injury  He denies any loss of consciousness  He has not on any thinners but does take 81 mg of aspirin every day  History provided by:  Patient  Foot Injury - Major  Location:  Foot  Time since incident:  1 hour  Injury: yes    Mechanism of injury: fall    Fall:     Fall occurred:  From a ladder    Height of fall:  3 feet    Impact surface:  [employer] of impact: side  Foot location:  R foot  Pain details:     Quality:  Aching    Radiates to:  Does not radiate    Severity:  Mild    Onset quality:  Sudden    Timing:  Constant    Progression:  Waxing and waning  Chronicity:  New  Dislocation: no    Prior injury to area:  No  Relieved by:  None tried  Worsened by:  Bearing weight  Ineffective treatments:  None tried  Associated symptoms: decreased ROM    Associated symptoms: no back pain, no fatigue, no fever, no itching, no muscle weakness, no neck pain, no numbness and no stiffness    Risk factors: no concern for non-accidental trauma, no frequent fractures, no known bone disorder, no obesity and no recent illness        Prior to Admission Medications   Prescriptions Last Dose Informant Patient Reported? Taking? B-D ULTRAFINE III SHORT PEN 31G X 8 MM MISC   No No   Sig: USE 1 PEN NEEDLE 8 TIMES DAILY   GLUCAGON EMERGENCY 1 MG injection   Yes No   Sig: INJECT 1MG SUBCUTANEOUSLY AS NEEDED (AS DIRECTED)   MAY REPEAT DOSE EVERY 20 MINUTES AS NEEDED   Patient not taking: Reported on 10/14/2022   Gvoke HypoPen 1-Pack 1 MG/0 2ML SOAJ   Yes No   Sig: INJECT 1MG UNDER THE SKIN ONE TIME AS NEEDED   Patient not taking: Reported on 10/14/2022   NIFEdipine ER (ADALAT CC) 60 MG 24 hr tablet   No No   Sig: Take 1 tablet (60 mg total) by mouth 2 (two) times a day   NovoLOG FlexPen ReliOn 100 UNIT/ML injection pen   Yes No   Patiromer Sorbitex Calcium (VELTASSA PO)   Yes No   Sig: Take 5 g by mouth once a week   Patient not taking: Reported on 10/14/2022   aspirin (ECOTRIN LOW STRENGTH) 81 mg EC tablet   Yes No   Sig: Take 81 mg by mouth daily   atorvastatin (LIPITOR) 40 mg tablet   No No   Sig: Take 1 tablet (40 mg total) by mouth every evening   b complex vitamins capsule   Yes No   Sig: Take 1 capsule by mouth daily before lunch    calcium acetate (PHOSLO) capsule   Yes No   Sig: TAKE 3 CAPSULES BY MOUTH WITH MEALS   cholecalciferol (VITAMIN D3) 1,000 units tablet   Yes No   Sig: Take 2,000 Units by mouth daily     cinacalcet (SENSIPAR) 60 MG tablet   Yes No   Sig: Take 120 mg by mouth daily 2 tab daily    cloNIDine (CATAPRES-TTS-3) 0 3 mg/24 hr   Yes No   Si patch once a week    doxazosin (CARDURA) 2 mg tablet   No No   Sig: TAKE 2 TABLETS BY MOUTH IN THE MORNING AND 2 IN THE EVENING   Patient not taking: Reported on 2022   escitalopram (LEXAPRO) 20 mg tablet   No No   Sig: Take 1 tablet (20 mg total) by mouth daily   famotidine (PEPCID) 40 MG tablet   No No   Sig: Take 1 tablet (40 mg total) by mouth daily   gabapentin (NEURONTIN) 100 mg capsule   No No   Sig: Take 2 tablets at bedtime   hydrOXYzine HCL (ATARAX) 10 mg tablet   No No   Sig: Take 1 tablet (10 mg total) by mouth every 6 (six) hours as needed for itching   insulin glargine (Basaglar KwikPen) 100 units/mL injection pen   No No   Sig: Inject 7 Units under the skin daily at bedtime   labetalol (NORMODYNE) 200 mg tablet   No No   Sig: TAKE 2 TABLETS BY MOUTH THREE TIMES DAILY   levothyroxine 25 mcg tablet   No No   Sig: Take 1 tablet (25 mcg total) by mouth daily   Patient not taking: Reported on 2022   lisinopril (ZESTRIL) 40 mg tablet   Yes No   Sig: Take 80 mg by mouth daily   Patient not taking: Reported on 8/25/2022   metolazone (ZAROXOLYN) 10 mg tablet   Yes No   Sig: Take 5 mg by mouth daily     Patient not taking: Reported on 8/25/2022   minoxidil (LONITEN) 2 5 mg tablet   Yes No   Sig: Take 2 5 mg by mouth 2 (two) times a day    Patient not taking: Reported on 8/25/2022   mupirocin (BACTROBAN) 2 % ointment   No No   Sig: Apply topically 3 (three) times a day Until lesion on right hand heals  ofloxacin (FLOXIN) 0 3 % otic solution   No No   Sig: Administer 10 drops to the right ear daily   Patient not taking: Reported on 10/14/2022   ondansetron (ZOFRAN) 4 mg tablet   No No   Sig: TAKE 1 TABLET BY MOUTH EVERY 8 HOURS AS NEEDED FOR NAUSEA FOR VOMITING   Patient not taking: Reported on 10/14/2022   torsemide (DEMADEX) 100 mg tablet   Yes No   Sig: Take 100 mg by mouth daily     traZODone (DESYREL) 50 mg tablet   No No   Sig: Take 0 5 tablets (25 mg total) by mouth daily at bedtime as needed for sleep   triamcinolone (KENALOG) 0 1 % ointment   No No   Sig: Apply topically 2 (two) times a day For up to 14 days on the itchy areas  Avoid groin, underarms and face  It is important to take at least 1 week break between uses  Facility-Administered Medications: None       Past Medical History:   Diagnosis Date   • Acute kidney injury ([facility] )    • Ambulates with cane    • Anuria    • Anxiety    • Cellulitis of right elbow 3/31/2021   • Chronic kidney disease    • Depression    • Diabetes mellitus ([facility] [facility] )    • Diarrhea    • Emesis 10/24/2020   • End stage renal disease ([facility] [address] ) 2/11/2018    Formatting of this note might be different from the original  Last Assessment & Plan:  Secondary to DM  On nightly PD  Followed by [facility]    Patient considering transplant for kidney and pancreas through [facility] Formatting of this note might be different from the original  Last Assessment & Plan:  Formatting of this note might be different from the original  Lab Results  Component Value Date   EGFR    • Falls    • Gastroparesis    • GERD (gastroesophageal reflux disease)    • History of shingles 2010   • History of transfusion 02/2018    no adverse reaction   • Hyperlipidemia    • Hyperphosphatemia    • Hypertension    • Itching    • Mastoiditis of right side 7/15/2022   • Muscle weakness     general unsteadiness   • Obesity (BMI 30 0-34 9) 9/9/2019   • Orthostatic hypotension 10/25/2020   • PONV (postoperative nausea and vomiting) 1/26/2018   • Protein-calorie malnutrition ([address] ) 11/23/2020   • Recurrent peritonitis ([address] ) due to peritoneal dialysis catheter 7/31/2020   • Retinopathy    • Seizures ([address] )     early 2020 - one time   • Skin abnormality     some dime size areas where skin was scratched from itching   • Spontaneous bacterial peritonitis ([address] ) 10/19/2020   • Stroke ([address] )     x2 - off balance/no driving/fatigue   • Swelling of both lower extremities    • Vomiting    • Wears glasses        Past Surgical History:   Procedure Laterality Date   • CARDIAC LOOP RECORDER  05/2018   • COLONOSCOPY     • EGD     • EYE SURGERY Right    • IR AV FISTULAGRAM/GRAFTOGRAM  2/23/2021   • IR TUNNELED CENTRAL LINE PLACEMENT  2/16/2021   • IR TUNNELED DIALYSIS CATHETER PLACEMENT  11/18/2020   • IR TUNNELED DIALYSIS CATHETER REMOVAL  2/12/2021   • IR TUNNELED DIALYSIS CATHETER REMOVAL  3/11/2021   • PERITONEAL CATHETER INSERTION N/A 8/27/2018    Procedure: UNROOF PD CATHETER;  Surgeon: Mabel Bobby DO;  [facility]: [facility];  Service: General   • IL ANASTOMOSIS,AV,ANY SITE Left 11/9/2020    Procedure: CREATION FISTULA  ARTERIOVENOUS (AV) - LEFT WRIST;  Surgeon: Inocencio Baumgarten, MD;  [facility]: [facility];  Service: Vascular   • IL ESOPHAGOGASTRODUODENOSCOPY TRANSORAL DIAGNOSTIC N/A 4/18/2019    Procedure: ESOPHAGOGASTRODUODENOSCOPY (EGD); Surgeon: Hans Jimenez MD;  Location: [facility];   Service: Gastroenterology   • IL LAP INSERTION TUNNELED INTRAPERITONEAL CATHETER N/A 2018    Procedure: LAPAROSCOPIC PD CATHETER PLACEMENT;  Surgeon: Matias Sanchez DO;  [facility];  Service: General   • NM REMOVAL TUNNELED INTRAPERITONEAL CATHETER N/A 2020    Procedure: REMOVAL CATHETER PERITONEAL DIALYSIS;  Surgeon: Danielle Dawson MD;  [facility];  Service: General   • TONSILLECTOMY     • UPPER GASTROINTESTINAL ENDOSCOPY         Family History   Problem Relation Age of Onset   • Breast cancer Mother    • Hypertension Mother    • Hyperlipidemia Father    • Hypertension Father    • Leukemia Maternal Grandmother    • Hyperlipidemia Maternal Grandfather    • Hypertension Maternal Grandfather    • Hyperlipidemia Paternal Grandmother    • Hypertension Paternal Grandmother    • Heart disease Paternal Grandfather         cardiac disorder   • Diabetes Paternal Grandfather      I have reviewed and agree with the history as documented  E-Cigarette/Vaping   • E-Cigarette Use Current Every Day User    • Comments medical marijuana      E-Cigarette/Vaping Substances   • Nicotine No    • THC Yes    • CBD Yes    • Flavoring No    • Other No    • Unknown No      Social History     Tobacco Use   • Smoking status: Former     Packs/day: 0 50     Years: 12 00     Pack years: 6 00     Types: Cigarettes     Quit date: 2018     Years since quittin 8   • Smokeless tobacco: Never   • Tobacco comments:     quit 2018   Vaping Use   • Vaping Use: Every day   • Substances: THC, CBD   Substance Use Topics   • Alcohol use: Yes     Comment: occassionally   • Drug use: Yes     Types: Marijuana     Comment: medical marijuana       Review of Systems   Constitutional: Positive for activity change  Negative for appetite change, chills, diaphoresis, fatigue and fever  Respiratory: Negative for chest tightness and shortness of breath  Cardiovascular: Negative for chest pain  Gastrointestinal: Negative for abdominal pain  Musculoskeletal: Positive for arthralgias and gait problem  Negative for back pain, neck pain and stiffness  Skin: Positive for color change and rash  Negative for itching  Psychiatric/Behavioral: Negative for confusion  All other systems reviewed and are negative  Physical Exam  Physical Exam  Vitals and nursing note reviewed  Constitutional:       General: He is not in acute distress  Appearance: Normal appearance  He is normal weight  He is not ill-appearing, toxic-appearing or diaphoretic  HENT:      Head: Normocephalic and atraumatic  Right Ear: External ear normal       Left Ear: External ear normal    Eyes:      General:         Right eye: No discharge  Left eye: No discharge  Conjunctiva/sclera: Conjunctivae normal    Pulmonary:      Effort: Pulmonary effort is normal    Musculoskeletal:      Comments: Inspection of both the-here atraumatic upon inspection  There is tenderness palpated over the base of the 1st metatarsal upon palpation  There is erythema and weeping between all the toes consistent with tinea pedis  The remainder of the ankles lower legs and knees are nontender with good range pulses are +1 and symmetric  They are present over the dorsalis pedis and posterior tibial arteries   Skin:     Capillary Refill: Capillary refill takes less than 2 seconds  Findings: Erythema and rash present  Neurological:      General: No focal deficit present  Mental Status: He is alert and oriented to person, place, and time  Psychiatric:         Mood and Affect: Mood normal          Behavior: Behavior normal          Thought Content:  Thought content normal          Judgment: Judgment normal          Vital Signs  ED Triage Vitals [11/30/22 1408]   Temperature Pulse Respirations Blood Pressure SpO2   99 4 °F (37 4 °C) 79 18 (!) 185/89 98 %      Temp Source Heart Rate Source Patient Position - Orthostatic VS BP Location FiO2 (%)   Oral Monitor Sitting Left arm --      Pain Score       --           Vitals:    11/30/22 1408 BP: (!) 185/89   Pulse: 79   Patient Position - Orthostatic VS: Sitting         Visual Acuity      ED Medications  Medications - No data to display    Diagnostic Studies  Results Reviewed     None                 XR foot 3+ views RIGHT   ED Interpretation by Dillon Baker PA-C (11/30 1986)   No fracture                 Procedures  Procedures         ED Course                                             MDM  Number of Diagnoses or Management Options  Sprain of right great toe, initial encounter: new and requires workup  Tinea pedis of both feet: new and requires workup     Amount and/or Complexity of Data Reviewed  Tests in the radiology section of CPT®: ordered and reviewed  Tests in the medicine section of CPT®: ordered and reviewed    Risk of Complications, Morbidity, and/or Mortality  Presenting problems: moderate  Diagnostic procedures: moderate  Management options: moderate    Patient Progress  Patient progress: stable      Disposition  Final diagnoses:   Tinea pedis of both feet   Sprain of right great toe, initial encounter     Time reflects when diagnosis was documented in both MDM as applicable and the Disposition within this note     Time User Action Codes Description Comment    11/30/2022  4:00 PM Mardee River Add [B35 3] Tinea pedis of both feet     11/30/2022  4:03 PM Mardee River Add [S93 504A] Sprain of fifth toe of right foot, initial encounter     11/30/2022  4:05 PM Mardee River Remove [S93 504A] Sprain of fifth toe of right foot, initial encounter     11/30/2022  4:06 PM Mardee River Add [S93 501A] Sprain of right great toe, initial encounter       ED Disposition     ED Disposition   Discharge    Condition   Stable    Date/Time   Wed Nov 30, 2022  4:02 PM    Comment   Lashanda Mckeon discharge to home/self care                 Follow-up Information    None         Discharge Medication List as of 11/30/2022  4:17 PM      START taking these medications    Details clotrimazole (LOTRIMIN) 1 % cream Apply to affected area 2 times daily for 2 weeks, Normal      naproxen (Naprosyn) 500 mg tablet Take 1 tablet (500 mg total) by mouth 2 (two) times a day with meals for 7 days, Starting Wed 11/30/2022, Until Wed 12/7/2022, Normal         CONTINUE these medications which have NOT CHANGED    Details   aspirin (ECOTRIN LOW STRENGTH) 81 mg EC tablet Take 81 mg by mouth daily, Historical Med      atorvastatin (LIPITOR) 40 mg tablet Take 1 tablet (40 mg total) by mouth every evening, Starting Tue 9/20/2022, Normal      b complex vitamins capsule Take 1 capsule by mouth daily before lunch , Historical Med      B-D ULTRAFINE III SHORT PEN 31G X 8 MM MISC USE 1 PEN NEEDLE 8 TIMES DAILY, Normal      calcium acetate (PHOSLO) capsule TAKE 3 CAPSULES BY MOUTH WITH MEALS, Historical Med      cholecalciferol (VITAMIN D3) 1,000 units tablet Take 2,000 Units by mouth daily  , Historical Med      cinacalcet (SENSIPAR) 60 MG tablet Take 120 mg by mouth daily 2 tab daily , Historical Med      cloNIDine (CATAPRES-TTS-3) 0 3 mg/24 hr 1 patch once a week , Historical Med      doxazosin (CARDURA) 2 mg tablet TAKE 2 TABLETS BY MOUTH IN THE MORNING AND 2 IN THE EVENING, Normal      escitalopram (LEXAPRO) 20 mg tablet Take 1 tablet (20 mg total) by mouth daily, Starting Tue 7/12/2022, Normal      famotidine (PEPCID) 40 MG tablet Take 1 tablet (40 mg total) by mouth daily, Starting Mon 11/21/2022, Normal      gabapentin (NEURONTIN) 100 mg capsule Take 2 tablets at bedtime, Normal      GLUCAGON EMERGENCY 1 MG injection INJECT 1MG SUBCUTANEOUSLY AS NEEDED (AS DIRECTED)   MAY REPEAT DOSE EVERY 20 MINUTES AS NEEDED, Historical Med      Gvoke HypoPen 1-Pack 1 MG/0 2ML SOAJ INJECT 1MG UNDER THE SKIN ONE TIME AS NEEDED, Historical Med      hydrOXYzine HCL (ATARAX) 10 mg tablet Take 1 tablet (10 mg total) by mouth every 6 (six) hours as needed for itching, Starting Tue 10/5/2021, Normal      insulin glargine (Basaglar KwikPen) 100 units/mL injection pen Inject 7 Units under the skin daily at bedtime, Starting Sat 7/16/2022, Normal      labetalol (NORMODYNE) 200 mg tablet TAKE 2 TABLETS BY MOUTH THREE TIMES DAILY, Normal      levothyroxine 25 mcg tablet Take 1 tablet (25 mcg total) by mouth daily, Starting Tue 2/8/2022, Normal      lisinopril (ZESTRIL) 40 mg tablet Take 80 mg by mouth daily, Starting Wed 2/9/2022, Historical Med      metolazone (ZAROXOLYN) 10 mg tablet Take 5 mg by mouth daily  , Historical Med      minoxidil (LONITEN) 2 5 mg tablet Take 2 5 mg by mouth 2 (two) times a day , Starting Tue 2/9/2021, Historical Med      mupirocin (BACTROBAN) 2 % ointment Apply topically 3 (three) times a day Until lesion on right hand heals  , Starting Fri 10/14/2022, Normal      NIFEdipine ER (ADALAT CC) 60 MG 24 hr tablet Take 1 tablet (60 mg total) by mouth 2 (two) times a day, Starting Tue 3/3/2020, No Print      NovoLOG FlexPen ReliOn 100 UNIT/ML injection pen Starting Tue 8/16/2022, Historical Med      ofloxacin (FLOXIN) 0 3 % otic solution Administer 10 drops to the right ear daily, Starting Tue 5/31/2022, Normal      ondansetron (ZOFRAN) 4 mg tablet TAKE 1 TABLET BY MOUTH EVERY 8 HOURS AS NEEDED FOR NAUSEA FOR VOMITING, Normal      Patiromer Sorbitex Calcium (VELTASSA PO) Take 5 g by mouth once a week, Historical Med      torsemide (DEMADEX) 100 mg tablet Take 100 mg by mouth daily  , Historical Med      traZODone (DESYREL) 50 mg tablet Take 0 5 tablets (25 mg total) by mouth daily at bedtime as needed for sleep, Starting Sat 7/16/2022, Normal      triamcinolone (KENALOG) 0 1 % ointment Apply topically 2 (two) times a day For up to 14 days on the itchy areas  Avoid groin, underarms and face  It is important to take at least 1 week break between uses  , Starting Fri 10/14/2022, Normal             No discharge procedures on file      PDMP Review       Value Time User    PDMP Reviewed  Yes 7/15/2022  4:20 AM [facility] [address] Kellen Chawla MD          ED Provider  Electronically Signed by           Bradley Ceballos PA-C  11/30/22 203

## 2022-12-05 ENCOUNTER — OFFICE VISIT (OUTPATIENT)
Dept: PODIATRY | Facility: CLINIC | Age: 37
End: 2022-12-05

## 2022-12-05 VITALS
HEIGHT: 71 IN | DIASTOLIC BLOOD PRESSURE: 85 MMHG | HEART RATE: 76 BPM | SYSTOLIC BLOOD PRESSURE: 145 MMHG | BODY MASS INDEX: 31.22 KG/M2 | WEIGHT: 223 LBS

## 2022-12-05 DIAGNOSIS — E10.42 DIABETIC POLYNEUROPATHY ASSOCIATED WITH TYPE 1 DIABETES MELLITUS (HCC): Primary | ICD-10-CM

## 2022-12-05 NOTE — PROGRESS NOTES
Established patient with class findings presents for nail care  Vascular exam:  DP  0/4 bilateral; PT  0 4 bilateral   Dermatological exam:  Each toenail is thick and  dystrophic  Diagnosis:  Diabetes mellitus  Treatment: Trimmed multiple dystrophic toenails      Nail removal    Date/Time: 12/5/2022 10:49 AM  Performed by: Divya Sánchez DPM  Authorized by: Divya Sánchez DPM     Nails trimmed:     Number of nails trimmed:  10

## 2022-12-06 NOTE — PSYCH
MEDICATION MANAGEMENT NOTE        [address]      Name and Date of Birth: Naldo Mckeon 42 y o  1985 MRN: [id number]    Date of Visit: December 7, 2022    Reason for Visit: Follow-up visit for medication management       SUBJECTIVE:    Alondra Lewis is a 42 y o  male with past psychiatric history significant for major depressive disorder, generalized anxiety, cannabis use (medical) and insomnia who was personally seen and evaluated today at the [address] [place] [address] outpatient clinic for follow-up and medication management  Kaitlinrenee Cedillo presents as anxious and somewhat frustrated yet pleasant  His thoughts are linear and organized  He completes assessment without difficulty  Kaitlin Cedillo endorses compliance with psychotropic medication regimen consisting of Lexapro, PRN Trazodone, and PRN Hydroxyzine  He denies adverse medication side effects  Shey Leo presents to the clinic today voicing frustration regarding therapy  He is having a challenging time scheduling appointments and both supportive therapy and joint problem solving utilized  Acutely, Shey Leo reports adequate sleep with use of Trazodone  He does not use this agent frequently  His appetite is fair  No active SI/HI  Shey Leo states that his energy and motivation remain low, likely a product of his physical health  He remains on the renal transplant list and is adherent to appointments for dialysis, suggestive of self preservation  Shey Leo denies new onset anhedonia  He continues to enjoy time with his sister's children  Bill voices excitement regarding upcoming [employer] for [employer], suggestive of future-orientation  Shey Leo denies worsening of anxiety or pathologic worry  He does speak to ways he feels powerless and without control over his physical health, which inevitably results in anxiety  Again, supportive therapy provided  Shey Leo currently denies daily panic symptoms  He is mildly tense today  During today's examination, Thu aHrt does not exhibit objective evidence of aziza/hypomania or psychosis  Thu Hart denies recent ETOH or illicit substance abuse  He offers no further concerns  Current Rating Scores:     None completed today  Review Of Systems:      Constitutional feeling tired, low energy and as noted in HPI   ENT negative   Cardiovascular negative   Respiratory negative   Gastrointestinal negative   Genitourinary negative   Musculoskeletal negative   Integumentary negative   Neurological numbness and unstable gait   Endocrine negative   Other Symptoms none, all other systems are negative       Past Psychiatric History: (unchanged information from previous note copied and italicized) - Information that is bolded has been updated       Inpatient psychiatric admissions: Denies  Prior outpatient psychiatric linkage: Denies  Past/current psychotherapy: Currently linked with Pierre Bob  History of suicidal attempts/gestures: Denies  History of violence/aggressive behaviors: Denies  Psychotropic medication trials: Lexapro (3 years ago), Zoloft, Trazodone (now)  Substance abuse inpatient/outpatient rehabilitation: Denies     Substance Abuse History: (unchanged information from previous note copied and italicized) - Information that is bolded has been updated       No history of ETOH or illict substance abuse  Kee Bryant does endorse previous tobacco abuse and current medical cannabis prescription  No past legal actions or arrests secondary to substance intoxication  The patient denies prior DWIs/DUIs  No history of outpatient/inpatient rehabilitation programs   Mateus does not exhibit objective evidence of substance withdrawal during today's examination nor does Mateus appear under the influence of any psychoactive substance           Social History: (unchanged information from previous note copied and italicized) - Information that is bolded has been updated       Developmental: Denies a history of milestone/developmental delay  Denies a history of in-utero exposure to toxins/illicit substances  There is no documented history of IEP or need for special education  He was born and raised in [place], [place]  He lived there for 27 years  He moved to the [facility] in 2011 to live with his sister and work as   His parents have since relocated and he now lives with them    [employer] graduate - [employer] class of 2006 - degree in theatre   Marital history: single  Living arrangement, social support: parents - has a sister who has children (nephews and nieces)   Occupational History: on permanent disability  Access to firearms: Denies direct access to weapons/firearms  Mateus Mckeon has no history of arrests or violence with a deadly weapon       Traumatic History: (unchanged information from previous note copied and italicized) - Information that is bolded has been updated      Abuse:none is reported  Other Traumatic Events: 2 CVAs - 2015 and 2018      Past Medical History:    Past Medical History:   Diagnosis Date   • Acute kidney injury ([facility] [address] )    • Ambulates with cane    • Anuria    • Anxiety    • Cellulitis of right elbow 3/31/2021   • Chronic kidney disease    • Depression    • Diabetes mellitus ([address] )    • Diarrhea    • Emesis 10/24/2020   • End stage renal disease ([facility] [address] ) 2/11/2018    Formatting of this note might be different from the original  Last Assessment & Plan:  Secondary to DM  On nightly PD  Followed by [facility]    Patient considering transplant for kidney and pancreas through [address] of this note might be different from the original  Last Assessment & Plan:  Formatting of this note might be different from the original  Lab Results  Component Value Date   EGFR    • Falls    • Gastroparesis    • GERD (gastroesophageal reflux disease)    • History of shingles 2010   • History of transfusion 02/2018    no adverse reaction   • Hyperlipidemia    • Hyperphosphatemia    • Hypertension    • Itching    • Mastoiditis of right side 7/15/2022   • Muscle weakness     general unsteadiness   • Obesity (BMI 30 0-34 9) 9/9/2019   • Orthostatic hypotension 10/25/2020   • PONV (postoperative nausea and vomiting) 1/26/2018   • Protein-calorie malnutrition ([address] ) 11/23/2020   • Recurrent peritonitis ([address] ) due to peritoneal dialysis catheter 7/31/2020   • Retinopathy    • Seizures ([address] )     early 2020 - one time   • Skin abnormality     some dime size areas where skin was scratched from itching   • Spontaneous bacterial peritonitis ([address] ) 10/19/2020   • Stroke ([address] )     x2 - off balance/no driving/fatigue   • Swelling of both lower extremities    • Vomiting    • Wears glasses         Past Surgical History:   Procedure Laterality Date   • CARDIAC LOOP RECORDER  05/2018   • COLONOSCOPY     • EGD     • EYE SURGERY Right    • IR AV FISTULAGRAM/GRAFTOGRAM  2/23/2021   • IR TUNNELED CENTRAL LINE PLACEMENT  2/16/2021   • IR TUNNELED DIALYSIS CATHETER PLACEMENT  11/18/2020   • IR TUNNELED DIALYSIS CATHETER REMOVAL  2/12/2021   • IR TUNNELED DIALYSIS CATHETER REMOVAL  3/11/2021   • PERITONEAL CATHETER INSERTION N/A 8/27/2018    Procedure: UNROOF PD CATHETER;  Surgeon: Blair Quiroz DO;  [facility]: [facility];  Service: General   • FL ANASTOMOSIS,AV,ANY SITE Left 11/9/2020    Procedure: CREATION FISTULA  ARTERIOVENOUS (AV) - LEFT WRIST;  Surgeon: Marilu Mcmahon MD;  [facility]: [facility];  Service: Vascular   • FL ESOPHAGOGASTRODUODENOSCOPY TRANSORAL DIAGNOSTIC N/A 4/18/2019    Procedure: ESOPHAGOGASTRODUODENOSCOPY (EGD); Surgeon: Erick Simmons MD;  Location: [facility];   Service: Gastroenterology   • FL LAP INSERTION TUNNELED INTRAPERITONEAL CATHETER N/A 8/6/2018    Procedure: LAPAROSCOPIC PD CATHETER PLACEMENT;  Surgeon: Blair Quiroz DO;  [facility];  Service: General   • FL REMOVAL TUNNELED INTRAPERITONEAL CATHETER N/A 11/18/2020    Procedure: REMOVAL CATHETER PERITONEAL DIALYSIS;  Surgeon: Lesa Lehman MD;  Location[facility];  Service: General   • TONSILLECTOMY     • UPPER GASTROINTESTINAL ENDOSCOPY       Allergies   Allergen Reactions   • Sulfa Antibiotics Rash       Substance Abuse History:    Social History     Substance and Sexual Activity   Alcohol Use Yes    Comment: occassionally     Social History     Substance and Sexual Activity   Drug Use Yes   • Types: Marijuana    Comment: medical marijuana       Social History:    Social History     Socioeconomic History   • Marital status: Single     Spouse name: Not on file   • Number of children: Not on file   • Years of education: Not on file   • Highest education level: Not on file   Occupational History   • Occupation:      Comment: [employer]   Tobacco Use   • Smoking status: Former     Packs/day: 0 50     Years: 12 00     Pack years: 6 00     Types: Cigarettes     Quit date: 2018     Years since quittin 8   • Smokeless tobacco: Never   • Tobacco comments:     quit 2018   Vaping Use   • Vaping Use: Every day   • Substances: THC, CBD   Substance and Sexual Activity   • Alcohol use: Yes     Comment: occassionally   • Drug use: Yes     Types: Marijuana     Comment: medical marijuana   • Sexual activity: Not on file   Other Topics Concern   • Not on file   Social History Narrative    Caffeine use    single     Social Determinants of Health     Financial Resource Strain: Not on file   Food Insecurity: Not on file   Transportation Needs: Not on file   Physical Activity: Not on file   Stress: Not on file   Social Connections: Not on file   Intimate Partner Violence: Not on file   Housing Stability: Not on file       Family Psychiatric History:     Family History   Problem Relation Age of Onset   • Breast cancer Mother    • Hypertension Mother    • Hyperlipidemia Father    • Hypertension Father    • Leukemia Maternal Grandmother    • Hyperlipidemia Maternal Grandfather    • Hypertension Maternal Grandfather    • Hyperlipidemia Paternal Grandmother    • Hypertension Paternal Grandmother    • Heart disease Paternal Grandfather         cardiac disorder   • Diabetes Paternal Grandfather        History Review: The following portions of the patient's history were reviewed and updated as appropriate: allergies, current medications, past family history, past medical history, past social history, past surgical history and problem list          OBJECTIVE:     Vital signs in last 24 hours: There were no vitals filed for this visit      Mental Status Evaluation:    Appearance age appropriate, casually dressed, dressed appropriately, wearing a hat   Behavior cooperative, appears anxious, good eye contact   Speech normal rate, normal volume, normal pitch   Mood dysphoric, anxious   Affect constricted   Thought Processes organized, logical, goal directed   Associations intact associations   Thought Content no overt delusions   Perceptual Disturbances: no auditory hallucinations, no visual hallucinations   Abnormal Thoughts  Risk Potential Suicidal ideation - None at present  Homicidal ideation - None at present  Potential for aggression - No   Orientation oriented to person, place, time/date and situation   Memory recent and remote memory grossly intact   Consciousness alert and awake   Attention Span Concentration Span attention span and concentration are age appropriate   Intellect appears to be of average intelligence   Insight intact and good   Judgement intact and good   Muscle Strength and  Gait unstable gait, uses walker   Motor activity no abnormal movements   Language no difficulty naming common objects   Fund of Knowledge adequate knowledge of current events   Pain mild   Pain Scale Did not ask patient to formally rate       Laboratory Results: I have personally reviewed all pertinent laboratory/tests results    Recent Labs (last 2 months):   Telephone on 11/23/2022   Component Date Value   • Supplier Name 11/23/2022 [employer] [facility]    • Supplier Phone Number 11/23/2022 [phone]    • Order Status 11/23/2022 Confirmation In Progress    • Delivery Request Date 11/23/2022 11/23/2022    • Item Description 11/23/2022 Other Product (See Notes)    Office Visit on 10/14/2022   Component Date Value   • Case Report 10/14/2022                      Value:Surgical Pathology Report                         Case: [id number]                                   Authorizing Provider:  Mitchell Washburn MD          Collected:           10/14/2022 1540              Ordering Location:     [facility]      Received:            10/14/2022 [address] [place] [address]                                                                       Pathologist:           Livan Maldonado MD                                                           Specimen:    Skin, Other, Specimen A: Left Temple; Shave                                               • Final Diagnosis 10/14/2022                      Value: This result contains rich text formatting which cannot be displayed here  • Additional Information 10/14/2022                      Value: This result contains rich text formatting which cannot be displayed here  • Gross Description 10/14/2022                      Value: This result contains rich text formatting which cannot be displayed here  • Clinical Information 10/14/2022                      Value:Specimen A: Left Temple; Skin; Shave Biopsy; 42year old male with 1 5 cm x 1 5 cm Pink plaque with central crater; Differential Diagnosis: Rule out Keratoacanthoma    ATTENTION: DERMPATH       Suicide/Homicide Risk Assessment:    The following interventions are recommended: no intervention changes needed      Lethality Statement:    Based on today's assessment and clinical criteria, Breanna Sorto contracts for safety and is not an imminent risk of harm to self or others   Outpatient level of care is deemed appropriate at this current time  Lucinda Osullivan understands that if they can no longer contract for safety, they need to call the office or report to their nearest Emergency Room for immediate evaluation  Assessment/Plan:     Edith Nava a 42 y  o  male with past psychiatric history significant for major depressive disorder, generalized anxiety, cannabis use (medical) and insomnia who was personally seen and evaluated today at the [address] [place] [address] [address] outpatient clinic for follow-up and medication management  Bill endorses an extensive medical history complicated by 2 cerebral vascular accidents in 2015 and 2018  Ambrose Petty states that the etiology of these CVAs is unknown but suspected to be secondary to unmanaged diabetes mellitus, hypertension, and history of nicotine use  Ambrose Petty states that his second CVA in 2018 was far more debilitating than the first and result in significant impairment in occupational and social functionality  He now ambulates slowly with a walking cane and is on disability  He is no longer independent and requires transportation assistance via family  He also had to move back in with his parents which has been distressing  Over the last few years, Ambrose Petty states that he was placed on the [facility] renal transplant list and was awaiting a harvested kidney  In October 2020, in the context of familial discord and recent verbal disagreement with mother, Ambrose Petty voiced vague and fleeting thoughts of self-harm to his dialysis nurse  She was perturbed by these statements and referred him to his PCP  While being evaluated by his PCP, Ambrose Petty was then sent to the ED where he was ultimately given psychiatric resources in the community and discharged home  Unfortunately, as a result of this incident, Ambrose Petty was removed from the transplant list for "1 year"  This is particularly disconcerting as Ambrose Petty did not actually harm himself or engage in any self-destructive behavior   He was transparent with his treatment team and emotional raw after a disagreement with his mother  By undergoing dialysis at the exact moment he voiced these concerns to his nurse, he was seeking treatment and thus, acting in a self-preserving manner, suggestive of a will to live  Yeimy Saeed is frustrated regarding being removed from the renal transplant process and is seeking transplant via [facility]  His frustration regarding this process again, is representative of future-orientation and a will to live        Historically, Mateus denies most neurovegetative symptomatology suggestive of major depressive disorder or dysthymia  Mateus does admit to fragmented or non-restorative sleep that ultimately contributes to anergia and amotivation  He has lost his sense of connectivity and purpose as a result of his CVAs and physical limitations, however, he is engaging in therapy and now medication management to regain these  Mateus adamantly denies acute thoughts of suicide or self-harm  Paulo denies acute anxiety that is overwhelming but does repot historical symptomatology suggestive of pathologic/overwhelming anxiety  He does not experience daily or weekly panic attacks  He admits to mild nervousness and fearfulness regarding his health, which is appropriate  He does not feel on-edge, restless, or perpetually irritable  Mateus vehemently denies any acute or chronic history suggestive of an underlying affective (bipolar) organization  gueritaearle denies historical symptomatology suggestive of an underlying psychotic process       Today's Plan:     Psychopharmacologically, Paulo reports tolerability and benefit associated with PRN Trazodone, Hydroxyzine, and Lexapro  Although he is dysphoric and anxious at the moment, this is secondary to being without therapy, as per Paulo  He is seeking new therapeutic linkage and was educated on the process here at [facility]  He was also given informational packet regarding other agencies in the community   At this Luan bennett denies need for medication change or intervention       DSM-V Diagnoses:      1 ) Major depressive disorder (moderate, recurrent)  2 ) Generalized Anxiety Disorder  3 ) Medical Cannabis Use  4 ) Insomnia        Treatment Recommendations/Precautions:        1 ) Major depressive disorder (moderate, recurrent)  - Continue Lexapro 20mg Daily  - Continue engagement in individual psychotherapy with Jakob Kessler   - Psychoeducation provided regarding the importance of exercise and healthy dietary choices and their impact on mood, energy, and motivation  - Counseled to avoid ETOH, illict substances, and nicotine secondary to the detrimental effects of these substances on mental and physical health  - Encouraged to engage in non-verbal forms of therapy such as art therapy, music therapy, and mindfulness  - Discussed the bio-psycho-social model to treatment and therapeutic exercises/interventions were attempted to cognitively restructure thoughts     2  ) Generalized Anxiety Disorder  - Continue Lexapro 20mg Daily  - Continue PRN Hydroxyzine 10mg         3  ) Medical Cannabis Use  - Counseled to avoid ETOH, illict substances, and nicotine secondary to the detrimental effects of these substances on mental and physical health  - No concerns for misuse      4 ) Insomnia   - Continue Trazodone 25mg QHS PRN       Medication management every 3 months  Will start individual therapy with own therapist  Aware of need to follow up with family physician for medical issues  Aware of 24 hour and weekend coverage for urgent situations accessed by calling [facility] main practice number    Medications Risks/Benefits      Risks, Benefits And Possible Side Effects Of Medications:    Risks, benefits, and possible side effects of medications explained to Amber Dumont including risk of suicidality and serotonin syndrome related to treatment with antidepressants   He verbalizes understanding and agreement for treatment  Controlled Medication Discussion:     Not applicable    Psychotherapy Provided:     Individual psychotherapy provided: Yes  Counseling was provided during the session today for 16 minutes  Medication education provided to Brittny Wong   Recent stressors discussed with Westernbart Wong including COVID-19 issues, health issues, medical problems, limited support, everyday stressors and ongoing anxiety  Coping strategies reviewed with Westernbart Wong    Educated on importance of medication and treatment compliance  Importance of follow up with family physician for medical issues reviewed with Brittny Wong   Supportive therapy provided  Treatment Plan:    Completed and signed during the session: Not applicable - Treatment Plan to be completed by [address] [place] [address] E therapist      Visit Time    Visit Start Time: 11:30 AM  Visit Stop Time: 11:58 AM  Total Visit Duration: 28 minutes     The total visit duration detailed above includes: patient engagement, medication management, psychotherapy/counseling, discussion regarding treatment goals, and coordination of care  Note Share Disclaimer:      This note was not shared with the patient due to reasonable likelihood of causing patient harm      Marya Patel MD  Board Certified Diplomate of the American Board of Psychiatry and Neurology  12/07/22

## 2022-12-07 ENCOUNTER — OFFICE VISIT (OUTPATIENT)
Dept: PSYCHIATRY | Facility: CLINIC | Age: 37
End: 2022-12-07

## 2022-12-07 DIAGNOSIS — L29.9 PRURITUS: ICD-10-CM

## 2022-12-07 DIAGNOSIS — Z79.899 MEDICAL CANNABIS USE: ICD-10-CM

## 2022-12-07 DIAGNOSIS — F33.0 MILD EPISODE OF RECURRENT MAJOR DEPRESSIVE DISORDER (HCC): Primary | ICD-10-CM

## 2022-12-07 DIAGNOSIS — F41.1 GENERALIZED ANXIETY DISORDER: ICD-10-CM

## 2022-12-07 RX ORDER — HYDROXYZINE HYDROCHLORIDE 10 MG/1
10 TABLET, FILM COATED ORAL EVERY 6 HOURS PRN
Qty: 30 TABLET | Refills: 3 | Status: SHIPPED | OUTPATIENT
Start: 2022-12-07

## 2022-12-07 RX ORDER — TRAZODONE HYDROCHLORIDE 50 MG/1
25 TABLET ORAL
Qty: 30 TABLET | Refills: 3 | Status: SHIPPED | OUTPATIENT
Start: 2022-12-07

## 2022-12-14 ENCOUNTER — PROCEDURE VISIT (OUTPATIENT)
Dept: DERMATOLOGY | Facility: CLINIC | Age: 37
End: 2022-12-14

## 2022-12-14 VITALS
RESPIRATION RATE: 18 BRPM | HEART RATE: 75 BPM | HEIGHT: 71 IN | TEMPERATURE: 98.1 F | SYSTOLIC BLOOD PRESSURE: 132 MMHG | OXYGEN SATURATION: 99 % | WEIGHT: 215.6 LBS | BODY MASS INDEX: 30.18 KG/M2 | DIASTOLIC BLOOD PRESSURE: 78 MMHG

## 2022-12-14 DIAGNOSIS — C44.329 SQUAMOUS CELL CARCINOMA OF SKIN OF LEFT TEMPLE: ICD-10-CM

## 2022-12-14 NOTE — PATIENT INSTRUCTIONS
Mohs Microscopic Surgery After Care    WOUND CARE AFTER SURGERY:    Do NOT to remove the pressure bandage for 48 hours  Keep the area clean and dry while this bandage is on  After removing the bandage for the first time, gently clean the area with soap and water  If the bandage is difficult to remove, getting the bandage wet in the shower will sometimes help soften the adhesive and allow it to be removed more easily  You will now need to cleanse this area daily in the shower with gentle soap  There is no need to scrub the area  Apply plain Vaseline ointment (this is over the counter and not a prescription) to the site followed by a clean appropriately sized bandage to area  Non stick dressing and paper tape (or Hypafix) are recommended for sensitive skin but a bandaid is fine if it covers the area well  You will need to continue the above daily wound care until you return for suture removal in 7 days (generally 7 days for the face, 10-14 days off the face)      RESTRICTIONS:     For two DAYS:   - You will need to take it very easy as this time is highest risk for bleeding  Being a "couch potato" during these two days is generally recommended  - For surgeries on the face/neck/scalp: Avoid leaning down to pick things up off the floor as this brings blood up to your head  Instead, squat down to pick things up  For two WEEKS:   - No heavy lifting (anything greater than 10 pounds)   - You can start to do slow, gentle activities such as slow walking but nothing to increase your heart rate and blood pressure too much (such as cardiovascular exercise)  It is important to take it easy as there is still a risk for bleeding and a high risk popping of stitches open during this time  If we did surgery near the eyes (including the nose, forehead, front part of your scalp, cheeks): It is VERY common to get a large amount of swelling around your eyes (puffy eyes)   Although less frequent, this can be enough to swell your eyes shut and can also come along with bruising  This should not hurt and is very expected and normal  It is typically worst at ~ 3 days out from your surgery and dramatically better 1 week post-operatively  MANAGING YOUR PAIN AFTER SURGERY     You can expect to have some pain after surgery  This is normal  The pain is typically worse the first two days after surgery, and quickly begins to get better  The best strategy for controlling your pain after surgery is around the clock pain control  You can take over the counter Acetaminophen (Tylenol) for discomfort, if no contraindications  If you are taking this at the maximum dose, you can alternate this with Motrin (ibuprofen or Advil) as well  Alternating these medications with each other allows you to maximize your pain control  In addition to Tylenol and Motrin, you can use heating pads or ice packs on your incisions to help reduce your pain  How will I alternate your regular strength over-the-counter pain medication? You will take a dose of pain medication every three hours  Start by taking 650 mg of Tylenol (2 pills of 325 mg)   3 hours later take 600 mg of Motrin (3 pills of 200 mg)   3 hours after taking the Motrin take 650 mg of Tylenol   3 hours after that take 600 mg of Motrin  See example - if your first dose of Tylenol is at 12:00 PM     12:00 PM  Tylenol 650 mg (2 pills of 325 mg)    3:00 PM  Motrin 600 mg (3 pills of 200 mg)    6:00 PM  Tylenol 650 mg (2 pills of 325 mg)    9:00 PM  Motrin 600 mg (3 pills of 200 mg)    Continue alternating every 3 hours      Important:   Do not take more than 4000mg of Tylenol or 3200mg of Motrin in a 24-hour period  What if I still have pain? If you have pain that is not controlled with the over-the-counter pain medications (Tylenol and Motrin or Advil), don't hesitate to call our staff using the number provided   We will help make sure you are managing your pain in the best way possible, and if necessary, we can provide a prescription for additional pain medication  CALL OUR OFFICE IMMEDIATELY FOR ANY SIGNS OF INFECTION:    This includes fever, chills, increased redness, warmth, tenderness, severe discomfort/pain, or pus or foul smell coming from the wound  If you are experiencing any of the above, please call [facility] directly at [phone] (SKIN)    IF BLEEDING IS NOTICED:    Place a clean cloth over the area and apply firm pressure for thirty minutes  Check the wound ONLY after 30 minutes of direct pressure; do not cheat and sneak a peak, as that does not count  If bleeding persists after 30 minutes of legitimate direct pressure, then try one more round of direct pressure to the area  Should the bleeding become heavier or not stop after the second attempt, call [facility] directly at [phone] (SKIN)  Your call will get routed to the dermatology surgeon on call even after hours

## 2022-12-14 NOTE — PROGRESS NOTES
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  Past Medical History:   Diagnosis Date   • Acute kidney injury ([address] )    • Ambulates with cane    • Anuria    • Anxiety    • Cellulitis of right elbow 2021   • Chronic kidney disease    • Depression    • Diabetes mellitus ([address] )    • Diarrhea    • Emesis 10/24/2020   • End stage renal disease ([address] ) 2018    Formatting of this note might be different from the original  Last Assessment & Plan:  Secondary to DM  On nightly PD  Followed by [facility]    Patient considering transplant for kidney and pancreas through [id number] of this note might be different from the original  Last Assessment & Plan:  Formatting of this note might be different from the original  Lab Results  Component Value Date   EGFR    • Falls    • Gastroparesis    • GERD (gastroesophageal reflux disease)    • History of shingles    • History of transfusion 2018    no adverse reaction   • Hyperlipidemia    • Hyperphosphatemia    • Hypertension    • Itching    • Mastoiditis of right side 07/15/2022   • Muscle weakness     general unsteadiness   • Obesity (BMI 30 0-34 9) 2019   • Orthostatic hypotension 10/25/2020   • PONV (postoperative nausea and vomiting) 2018   • Protein-calorie malnutrition ([address] ) 2020   • Recurrent peritonitis ([address] ) due to peritoneal dialysis catheter 2020   • Retinopathy    • Seizures ([address] )     early 2020 - one time   • Skin abnormality     some dime size areas where skin was scratched from itching   • Spontaneous bacterial peritonitis ([address] ) 10/19/2020   • Squamous cell skin cancer     left temple   • Stroke ([address] )     x2 - off balance/no driving/fatigue   • Swelling of both lower extremities    • Vomiting    • Wears glasses        Past Surgical History:   Procedure Laterality Date   • CARDIAC LOOP RECORDER  05/2018   • COLONOSCOPY     • EGD     • EYE SURGERY Right    • IR AV FISTULAGRAM/GRAFTOGRAM  02/23/2021   • IR TUNNELED CENTRAL LINE PLACEMENT  02/16/2021   • IR TUNNELED DIALYSIS CATHETER PLACEMENT  11/18/2020   • IR TUNNELED DIALYSIS CATHETER REMOVAL  02/12/2021   • IR TUNNELED DIALYSIS CATHETER REMOVAL  03/11/2021   • MOHS SURGERY Left 12/14/2022    Left temple with Dr Dorina Goddard   • PERITONEAL [facility] N/A 08/27/2018    Procedure: UNROOF PD CATHETER;  Surgeon: Shannan Ron DO;  [facility];  Service: General   • CO ANASTOMOSIS,AV,ANY SITE Left 11/09/2020    Procedure: CREATION FISTULA  ARTERIOVENOUS (AV) - LEFT WRIST;  Surgeon: Juanito Trevino MD;  [facility];  Service: Vascular   • CO ESOPHAGOGASTRODUODENOSCOPY TRANSORAL DIAGNOSTIC N/A 04/18/2019    Procedure: ESOPHAGOGASTRODUODENOSCOPY (EGD); Surgeon: Candy Pompa MD;  Location: [facility];   Service: Gastroenterology   • CO LAP INSERTION TUNNELED INTRAPERITONEAL CATHETER N/A 08/06/2018    Procedure: LAPAROSCOPIC PD CATHETER PLACEMENT;  Surgeon: Shannan Ron DO;  [facility];  Service: General   • CO REMOVAL TUNNELED INTRAPERITONEAL CATHETER N/A 11/18/2020    Procedure: REMOVAL CATHETER PERITONEAL DIALYSIS;  Surgeon: Emanuel Wong MD;  [facility];  Service: General   • TONSILLECTOMY     • UPPER GASTROINTESTINAL ENDOSCOPY           Current Outpatient Medications:   •  aspirin (ECOTRIN LOW STRENGTH) 81 mg EC tablet, Take 81 mg by mouth daily, Disp: , Rfl:   •  atorvastatin (LIPITOR) 40 mg tablet, Take 1 tablet (40 mg total) by mouth every evening, Disp: 90 tablet, Rfl: 3  •  b complex vitamins capsule, Take 1 capsule by mouth daily before lunch , Disp: , Rfl:   •  B-D ULTRAFINE III SHORT PEN 31G X 8 MM MISC, USE 1 PEN NEEDLE 8 TIMES DAILY, Disp: 100 each, Rfl: 47  •  calcium acetate (PHOSLO) capsule, TAKE 3 CAPSULES BY MOUTH WITH MEALS, Disp: , Rfl: •  cholecalciferol (VITAMIN D3) 1,000 units tablet, Take 2,000 Units by mouth daily  , Disp: , Rfl:   •  cinacalcet (SENSIPAR) 60 MG tablet, Take 120 mg by mouth daily 2 tab daily , Disp: , Rfl:   •  cloNIDine (CATAPRES-TTS-3) 0 3 mg/24 hr, 1 patch once a week , Disp: , Rfl:   •  doxazosin (CARDURA) 2 mg tablet, TAKE 2 TABLETS BY MOUTH IN THE MORNING AND 2 IN THE EVENING, Disp: 120 tablet, Rfl: 0  •  escitalopram (LEXAPRO) 20 mg tablet, Take 1 tablet (20 mg total) by mouth daily, Disp: 90 tablet, Rfl: 2  •  famotidine (PEPCID) 40 MG tablet, Take 1 tablet (40 mg total) by mouth daily, Disp: 90 tablet, Rfl: 1  •  gabapentin (NEURONTIN) 100 mg capsule, Take 2 tablets at bedtime, Disp: 60 capsule, Rfl: 5  •  GLUCAGON EMERGENCY 1 MG injection, , Disp: , Rfl: 3  •  Gvoke HypoPen 1-Pack 1 MG/0 2ML SOAJ, , Disp: , Rfl:   •  hydrOXYzine HCL (ATARAX) 10 mg tablet, Take 1 tablet (10 mg total) by mouth every 6 (six) hours as needed for itching or anxiety, Disp: 30 tablet, Rfl: 3  •  insulin glargine (Basaglar KwikPen) 100 units/mL injection pen, Inject 7 Units under the skin daily at bedtime, Disp: 3 mL, Rfl: 0  •  labetalol (NORMODYNE) 200 mg tablet, TAKE 2 TABLETS BY MOUTH THREE TIMES DAILY, Disp: 180 tablet, Rfl: 0  •  levothyroxine 25 mcg tablet, Take 1 tablet (25 mcg total) by mouth daily, Disp: 30 tablet, Rfl: 1  •  lisinopril (ZESTRIL) 40 mg tablet, Take 80 mg by mouth daily, Disp: , Rfl:   •  metolazone (ZAROXOLYN) 10 mg tablet, Take 5 mg by mouth daily, Disp: , Rfl:   •  minoxidil (LONITEN) 2 5 mg tablet, Take 2 5 mg by mouth 2 (two) times a day, Disp: , Rfl:   •  mupirocin (BACTROBAN) 2 % ointment, Apply topically 3 (three) times a day Until lesion on right hand heals  , Disp: 30 g, Rfl: 4  •  NIFEdipine ER (ADALAT CC) 60 MG 24 hr tablet, Take 1 tablet (60 mg total) by mouth 2 (two) times a day, Disp: 180 tablet, Rfl: 0  •  NovoLOG FlexPen ReliOn 100 UNIT/ML injection pen, , Disp: , Rfl:   •  ondansetron (ZOFRAN) 4 mg tablet, TAKE 1 TABLET BY MOUTH EVERY 8 HOURS AS NEEDED FOR NAUSEA FOR VOMITING, Disp: 90 tablet, Rfl: 0  •  Patiromer Sorbitex Calcium (VELTASSA PO), Take 5 g by mouth once a week, Disp: , Rfl:   •  torsemide (DEMADEX) 100 mg tablet, Take 100 mg by mouth daily  , Disp: , Rfl:   •  traZODone (DESYREL) 50 mg tablet, Take 0 5 tablets (25 mg total) by mouth daily at bedtime as needed for sleep, Disp: 30 tablet, Rfl: 3  •  triamcinolone (KENALOG) 0 1 % ointment, Apply topically 2 (two) times a day For up to 14 days on the itchy areas  Avoid groin, underarms and face  It is important to take at least 1 week break between uses  , Disp: 454 g, Rfl: 0  •  clotrimazole (LOTRIMIN) 1 % cream, Apply to affected area 2 times daily for 2 weeks (Patient not taking: Reported on 12/14/2022), Disp: 15 g, Rfl: 0  •  naproxen (Naprosyn) 500 mg tablet, Take 1 tablet (500 mg total) by mouth 2 (two) times a day with meals for 7 days, Disp: 14 tablet, Rfl: 0  •  ofloxacin (FLOXIN) 0 3 % otic solution, Administer 10 drops to the right ear daily (Patient not taking: Reported on 12/14/2022), Disp: 5 mL, Rfl: 0    Allergies   Allergen Reactions   • Sulfa Antibiotics Rash       Physical Exam:   Vitals:    12/14/22 0901   BP: 132/78   Pulse: 75   Resp: 18   Temp: 98 1 °F (36 7 °C)   SpO2: 99%     General: Awake, Alert, Oriented x 3, Mood and affect appropriate  Respiratory: Respirations even and unlabored  Cardiovascular: Peripheral pulses intact; no edema  Musculoskeletal Exam: N/A    Skin: 1 4 cm x 1 4 cm atrophic plaque with central crusting on the left temple  Assessment: Biopsy proven to be squamous cell carcinoma on the left temple    Plan:  Mohs    MOHS Procedure Timeout    Flowsheet Row Most Recent Value   Timeout: 0217   Patient Identity Verified: Yes   Correct Site Verified: Yes   Correct Procedure Verified: Yes          MOHS Diagnosis/Indication/Location/ID    Flowsheet Row Most Recent Value   Pathology Type Squamous cell carcinoma Anatomic Site left temple   Indications for MOHS tumor location   MOHS ID [id number]          MOHS Site/Accession/Pre-Post    Flowsheet Row Most Recent Value   Original Site Identified (as submitted by referring clinician) Photo   Biopsy Accession/Specimen # (as submitted by referring clincian) [id number]   Pre-MOHS Size Length (cm) 1 4   Pre-MOHS Size Width (cm) 1 4   Post-MOHS Size-Length (cm) 1 8   Post MOHS Size-Width (cm) 1 6   Repair Type Intermediate layered closure   Suture Type Prolene, Vicryl   Prolene Suture Size 6   Vicryl Suture Size 4   Final repair length (cm): 5 6   Anesthetic Used 1% Lidocaine with epinephrine          MOHS Tumor Stage 1 Information    Flowsheet Row Most Recent Value   Tissue Sections (blocks) 2   Microscopic Exam Section 1: No tumor identified in section  Microscopic Exam Section 2: No tumor identified in section  Tumor Clear After Stage I? Yes                       Patient identified, procedure verified, site identified and verified  Time out completed  Surgical removal of the lesion discussed with the patient (risks and benefits, including possibility of scarring, infection, recurrence or potential for further treatment)  I have specifically identified the site with the patient  I have discussed the fact that the patient will have a scar after the procedure regardless of granulation or repair with sutures  I have discussed that the repair options can range from granulation in some cases to linear or curvilinear closures to larger flaps or grafts  There are sometimes flaps or grafts used that require multiples stages of surgery and will not be completed today, rather be completed over a series of appointments   I have discussed that occasionally due to location, size or depth of the lesion I may recommend consultation with and transfer of care for further removal or the reconstruction to another provider such as ophthalmology surgery, plastic surgery, ENT surgery, or surgical oncology  There are cases in which other testing such as imaging with MRI or CT scan or testing of lymph nodes is recommended because of the nature/depth/location of tumor seen during the removal  There is a risk of injury to nerves causing temporary or permanent numbness or the inability to move muscles full such as the inability to lift eyebrows  Questions answered and verbal and written consent was obtained  The tumor qualifies for Mohs based on AUC criteria  Dr Shantell Klein served as the surgeon and pathologist during the procedure  With the patient in the supine position and under adequate local anesthesia with 1% lidocaine with epinephrine 1:100,000, the defect was scrubbed with Hibiclens  Sterile drapes were placed from the sterile tray  Because of the location of the surgical defect, an intermediate closure was judged to give the best possible cosmetic and functional result  The edges of the defect were carefully debrided removing any dead or coagulated tissue  Hemostasis was obtained by pinpoint electrocoagulation  Careful planning of removal of redundant tissue at either end of the defect was drawn out so that the suture lines would fall in the optimal orientation with regard to the relaxed skin tension lines  These were then removed with a #15 blade scalpel  The wound was then approximated by a deep layer of buried vertical mattress sutures and the cutaneous margins were approximated and closed by superficial sutures as noted above  Estimated blood loss was less than 5 mL  The patient tolerated the procedure well  The wound was dressed with petrolatum, a non-stick pad, and a compression dressing  Sunny Mejias MD served as the surgeon and pathologist during the procedure  Postoperative care: Wound care discussed at length  I urged the patient to call us if any problems or question should arise       Complications: none  Post-op medications: none  Patient condition after procedure: stable  Discharge plans: Plan for suture removal 7 days, at next scheduled appointment         Scribe Attestation    I,:  Gissel Best RN am acting as a scribe while in the presence of the attending physician :       I,:  Doc Sanford MD personally performed the services described in this documentation    as scribed in my presence :

## 2022-12-21 ENCOUNTER — OFFICE VISIT (OUTPATIENT)
Dept: DERMATOLOGY | Facility: CLINIC | Age: 37
End: 2022-12-21

## 2022-12-21 DIAGNOSIS — Z48.02 ENCOUNTER FOR REMOVAL OF SUTURES: Primary | ICD-10-CM

## 2022-12-21 NOTE — PROGRESS NOTES
Suture removal    Date/Time: 12/21/2022 3:06 PM  Performed by: Nitish Heaton RN  Authorized by: Lashanda Gibson MD   Universal Protocol:  Consent: Verbal consent obtained  Written consent not obtained  Risks and benefits: risks, benefits and alternatives were discussed  Consent given by: patient  Time out: Immediately prior to procedure a "time out" was called to verify the correct patient, procedure, equipment, support staff and site/side marked as required  Timeout called at: 12/21/2022 3:06 PM   Patient understanding: patient states understanding of the procedure being performed  Patient consent: the patient's understanding of the procedure matches consent given  Procedure consent: procedure consent matches procedure scheduled  Relevant documents: relevant documents present and verified  Test results: test results not available  Site marked: the operative site was not marked  Radiology Images displayed and confirmed  If images not available, report reviewed: imaging studies not available  Patient identity confirmed: verbally with patient        Patient location:  Clinic  Location:     Laterality:  Left    Location:  [address] location: Left temple  Procedure details: Tools used:  Suture removal kit    Wound appearance:  No sign(s) of infection, good wound healing, clean, moist, nonpurulent, nontender and pink    Number of sutures removed:  15  Post-procedure details:     Post-procedure assessment: vaseline ointment applied  Patient tolerance of procedure: Tolerated well, no immediate complications  Comments:      Patient was encouraged to continue to clean and care for the wound until fully healed  Patient was encouraged to continue to follow up for regular full body skin exams as scheduled               Scribe Attestation    I,:  Nitish Heaton RN am acting as a scribe while in the presence of the attending physician :       I,:  Lashanda Gibson MD personally performed the services described in this documentation    as scribed in my presence :

## 2023-01-01 NOTE — ASSESSMENT & PLAN NOTE
GI consulted, appreciate input  Patient started on erythromycin and famotidine 1 day prior, tolerating well  Tolerating diet  Zofran p r n    Outpatient GI follow-up Infant remains on RA w/ stable temps in air-controlled isolette; dressed and swaddled. Intermittent labile O2 sats. Tolerating q3 nipple/gavage feeds of EBM/DEBM 20kcal using Nfant gold nipple. 1 emesis through the nose this shift two hours post 2300 feed. Infant completed 1 PO feed this shift. Voiding and passing stool; 2 dry diapers @ 0200 and 0500. UOP  = 1.8mL/kg/hr. NNP aware. No family contact this shift. Will continue to monitor.

## 2023-01-01 NOTE — CONSULTS
Consultation - Nephrology   Trev Elizondo 26 y o  male MRN: [id number]  Unit/Bed#: -01 Encounter: [id number]    ASSESSMENT and PLAN:  1  End-stage renal disease:  Secondary to diabetes I and hypertension  -recently started on PD one week ago with increase in one exchange to 2 5%  -increase in creatinine likely secondary to volume depletion from volume removal and diuretic use  -diuretic on hold currently  -discussed with Dr Teodoro Novoa and PD nurse for outpatient center  -will resume 1 5% exchanges with 2 L with four exchanges daily, start now and monitor tolerance  -will have patient return to PD clinic on Wednesday for continued cycler education  -will continue to hold diuretic for now  -if patient tolerates PD exchange now okay for discharge with follow up in the PD clinic    2  Nausea and vomiting:  Concern for gastroparesis  -recommend GI consult as an outpatient    3  Hypertension:  Since initiation of dialysis blood pressure has decreased  -and medications have been adjusted  -continue with hydralazine 50 mg 3 times a day, labetalol 150 mg 2 times daily, nifedipine 60 mg daily  -hold diuretics at this time    4  Anemia of Chronic Kidney Disease:  -receives Epogen as outpatient every two weeks  -hemoglobin 10 7 on admission likely secondary to volume depletion  -now 8 8 however stable since July, 2018  -trend for now and continue HETAL as outpatient    5  Bone mineral disease of Chronic Kidney Disease:   -continue phosphorus binders for hyperphosphatemia  -trend phosphorus and PTH as outpatient    6  Diabetes I:  Encourage Endocrinology follow-up  -per primary team currently    7  Metabolic acidosis:  Likely secondary to end-stage renal disease  -Bicarb within normal limits  -continue sodium bicarb 650 mg 4 times daily for now  -trend as outpatient    8    Volume depletion:  Secondary to increased dextrose exchanges with PD in combination with diuretic use  -improved with IV fluids and holding dialysis for 24 hr        HISTORY OF PRESENT ILLNESS:  Requesting Physician: Compa Ríos MD  Reason for Consult:  End-stage renal disease    Danilo Carrero is a 26 y o  male who was admitted to [address] [place] [address] after presenting with nausea, vomiting, feeling full, and decreased appetite since Friday  He has a history of DM I, HTN, CVA, and anemia who recently started on PD on 9/7/2018  A renal consultation is requested today for assistance in the management of ESRD of PD with elevated creatinine  On discussion, the patient admits to on and off nausea and vomiting for some time prior to even starting dialysis, changes in his evening Lantus from endocrinologist however he has not been eating very well and noted lower blood sugars, also notice decreased urinary output at home without having issues with feeling that he is retaining  Since admission he has been drained and has been without dialysis for 24 hr   He reports feeling better actually hungry  No longer having nausea vomiting  He states since starting dialysis he has had a full feeling and was recently increased to 1 5% for three bags and 2 5% for one bag  He is in the middle of being trained for using the cycler  He denies chest pain, shortness of Breath, dizziness, lightheadedness, urinary issues, fevers, or chills  He states he is not having any issues with his PD catheter       PAST MEDICAL HISTORY:  Past Medical History:   Diagnosis Date    Acute kidney injury ([facility] )     Cerebellar stroke side undetermined 2015 2015,1/2018    Diabetes type 1, controlled ([facility] )     IDDM    History of shingles 2010    History of transfusion 02/2018    Hypertension     Muscle weakness     general unsteadiness       PAST SURGICAL HISTORY:  Past Surgical History:   Procedure Laterality Date    CARDIAC LOOP RECORDER  05/2018    PERITONEAL CATHETER INSERTION N/A 8/27/2018    Procedure: UNROOF PD CATHETER;  Surgeon: Roosevelt Eastman DO;  [facility];  Service: General    [place] LAP INSERTION TUNNELED INTRAPERITONEAL CATHETER N/A 8/6/2018    Procedure: LAPAROSCOPIC PD CATHETER PLACEMENT;  Surgeon: Michel Borja DO;  [facility];  Service: General    TONSILLECTOMY         ALLERGIES:  Allergies   Allergen Reactions    Sulfa Antibiotics Rash       SOCIAL HISTORY:  History   Alcohol Use    Yes     Comment: rarely     History   Drug Use No     Comment: weekly-none since 11/2017     History   Smoking Status    Former Smoker    Packs/day: 0 50    Years: 12 00    Types: Cigarettes   Smokeless Tobacco    Never Used     Comment: quit 2/2018       FAMILY HISTORY:  Family History   Problem Relation Age of Onset    Breast cancer Mother     Hypertension Mother     Hyperlipidemia Father     Hypertension Father     Leukemia Maternal Grandmother     Hyperlipidemia Maternal Grandfather     Hypertension Maternal Grandfather     Hyperlipidemia Paternal Grandmother     Hypertension Paternal Grandmother     Heart disease Paternal Grandfather         cardiac disorder    Diabetes Paternal Grandfather        MEDICATIONS:    Current Facility-Administered Medications:     atorvastatin (LIPITOR) tablet 40 mg, 40 mg, Oral, QPM, Carlie Redd MD, 40 mg at 09/16/18 1659    calcium acetate (PHOSLO) capsule 667 mg, 667 mg, Oral, TID With Meals, Carlie Redd MD, 667 mg at 09/17/18 0837    clopidogrel (PLAVIX) tablet 75 mg, 75 mg, Oral, Daily, Carlie Redd MD, 75 mg at 09/16/18 1659    escitalopram (LEXAPRO) tablet 10 mg, 10 mg, Oral, Daily, Carlie Redd MD, 10 mg at 36/60/84 4577    folic acid (FOLVITE) tablet 1 mg, 1 mg, Oral, Daily, Carlie Redd MD    hydrALAZINE (APRESOLINE) tablet 50 mg, 50 mg, Oral, TID, Carlie Redd MD, 50 mg at 09/17/18 0557    insulin glargine (LANTUS) subcutaneous injection 10 Units 0 1 mL, 10 Units, Subcutaneous, Q12H [address], Carlie Redd MD, 10 Units at 09/17/18 0838    insulin lispro (HumaLOG) 100 units/mL subcutaneous injection 1-5 Units, 1-5 Units, Subcutaneous, TID AC, 1 Units at 09/17/18 0839 **AND** Fingerstick Glucose (POCT), , , TID AC, Luan Montaño MD    insulin lispro (HumaLOG) 100 units/mL subcutaneous injection 1-6 Units, 1-6 Units, Subcutaneous, HS, Luan Montaño MD, 2 Units at 09/16/18 2116    insulin lispro (HumaLOG) 100 units/mL subcutaneous injection 5 Units, 5 Units, Subcutaneous, TID With Meals, Luan Montaño MD, 5 Units at 09/17/18 0838    labetalol (NORMODYNE) tablet 150 mg, 150 mg, Oral, BID, Luan Montaño MD, 150 mg at 09/17/18 0557    NIFEdipine (PROCARDIA XL) 24 hr tablet 60 mg, 60 mg, Oral, Daily, Luan Montaño MD, 60 mg at 09/17/18 0557    ondansetron (ZOFRAN) injection 4 mg, 4 mg, Intravenous, Q4H PRN, Luan Montaño MD    sodium bicarbonate tablet 650 mg, 650 mg, Oral, 4x Daily, Luan Montaño MD, 650 mg at 09/17/18 4795    REVIEW OF SYSTEMS:  All the systems were reviewed and were negative except as documented on the HPI      PHYSICAL EXAM:  Current Weight: Weight - Scale: 92 4 kg (203 lb 11 3 oz)  First Weight: Weight - Scale: 88 kg (194 lb)  Vitals:    09/16/18 2114 09/16/18 2307 09/17/18 0554 09/17/18 0739   BP: 168/86 157/83 170/82 164/82   BP Location: Left arm Left arm Left arm Left arm   Pulse: 85 88 92 87   Resp:  18  16   Temp:  99 1 °F (37 3 °C)  98 8 °F (37 1 °C)   TempSrc:  Oral  Oral   SpO2:  99%  98%   Weight:       Height:           Intake/Output Summary (Last 24 hours) at 09/17/18 1124  Last data filed at 09/17/18 0603   Gross per 24 hour   Intake              480 ml   Output              650 ml   Net             -170 ml     General: cooperative, not in acute distress  Skin: no rash, warm and dry  Eyes: pale conjunctivae, anicteric sclerae  ENT: moist lips and mucous membranes  Neck: supple, no JVD noted  Chest: clear breath sounds without crackles or wheezes  CVS: normal rate, regular rhythm, no rub  Abdomen: soft, non-tender, non-distended, normoactive bowel sounds, PD catheter site intact  Extremities: no edema of both legs bilaterally  Neuro: awake, alert, alert x3  Psych: appropriate affect        Lab Results:     Results from last 7 days  Lab Units 09/17/18  0606 09/16/18  1058   WBC Thousand/uL 7 47 8 68   HEMOGLOBIN g/dL 8 8* 10 7*   HEMATOCRIT % 26 5* 31 2*   PLATELETS Thousands/uL 305 384   SODIUM mmol/L 142 138   POTASSIUM mmol/L 3 5 3 4*   CHLORIDE mmol/L 98* 96*   CO2 mmol/L 28 32   BUN mg/dL 48* 48*   CREATININE mg/dL 11 53* 11 74*   CALCIUM mg/dL 9 0 9 1   MAGNESIUM mg/dL  --  2 4   ALK PHOS U/L  --  79   ALT U/L  --  21   AST U/L  --  17       Other Studies: rash

## 2023-01-07 NOTE — ASSESSMENT & PLAN NOTE
· Likely due to CVA  · Continue coreg, hydarlazine, nicardipine drip, BP titrating down as recomended 07-Jan-2023 19:44

## 2023-01-11 ENCOUNTER — RA CDI HCC (OUTPATIENT)
Dept: OTHER | Facility: HOSPITAL | Age: 38
End: 2023-01-11

## 2023-01-11 NOTE — PROGRESS NOTES
[facility]  coding opportunities          Chart Reviewed number of suggestions sent to Provider: 1     Patients Insurance     Medicare Insurance: Medicare        [id number] [id number]

## 2023-01-12 ENCOUNTER — TELEPHONE (OUTPATIENT)
Dept: INTERNAL MEDICINE CLINIC | Facility: CLINIC | Age: 38
End: 2023-01-12

## 2023-01-12 NOTE — TELEPHONE ENCOUNTER
Patient's mother, Lizzette Morales called  Chanell Dumas started last Thursday with symptoms  Tested negative covid  Symptoms- Sore throat, Cough, sleeps all day, No Fever  There are no availability today, I did grab a same day tomorrow but unsure if he needed to be seen sooner     Lizzette Morales can be reached at   [phone]

## 2023-01-13 ENCOUNTER — OFFICE VISIT (OUTPATIENT)
Dept: INTERNAL MEDICINE CLINIC | Facility: CLINIC | Age: 38
End: 2023-01-13

## 2023-01-13 ENCOUNTER — TELEPHONE (OUTPATIENT)
Dept: INTERNAL MEDICINE CLINIC | Facility: CLINIC | Age: 38
End: 2023-01-13

## 2023-01-13 VITALS
DIASTOLIC BLOOD PRESSURE: 80 MMHG | OXYGEN SATURATION: 98 % | BODY MASS INDEX: 30.04 KG/M2 | TEMPERATURE: 98.3 F | HEIGHT: 71 IN | WEIGHT: 214.6 LBS | SYSTOLIC BLOOD PRESSURE: 110 MMHG | HEART RATE: 70 BPM

## 2023-01-13 DIAGNOSIS — E10.22 END STAGE RENAL DISEASE ON DIALYSIS DUE TO TYPE 1 DIABETES MELLITUS (HCC): ICD-10-CM

## 2023-01-13 DIAGNOSIS — N18.6 END STAGE RENAL DISEASE ON DIALYSIS DUE TO TYPE 1 DIABETES MELLITUS (HCC): ICD-10-CM

## 2023-01-13 DIAGNOSIS — Z99.2 END STAGE RENAL DISEASE ON DIALYSIS DUE TO TYPE 1 DIABETES MELLITUS (HCC): ICD-10-CM

## 2023-01-13 DIAGNOSIS — J01.00 ACUTE NON-RECURRENT MAXILLARY SINUSITIS: Primary | ICD-10-CM

## 2023-01-13 RX ORDER — SODIUM POLYSTYRENE SULFONATE 15 G/60ML
SUSPENSION ORAL; RECTAL
COMMUNITY
Start: 2022-12-13

## 2023-01-13 RX ORDER — INSULIN ASPART 100 [IU]/ML
INJECTION, SOLUTION INTRAVENOUS; SUBCUTANEOUS
COMMUNITY
Start: 2023-01-05

## 2023-01-13 RX ORDER — AMOXICILLIN 500 MG/1
500 CAPSULE ORAL EVERY 8 HOURS SCHEDULED
Qty: 21 CAPSULE | Refills: 0 | Status: SHIPPED | OUTPATIENT
Start: 2023-01-13 | End: 2023-01-13 | Stop reason: SDUPTHER

## 2023-01-13 RX ORDER — AMOXICILLIN 500 MG/1
500 CAPSULE ORAL EVERY 24 HOURS
Qty: 7 CAPSULE | Refills: 0 | Status: SHIPPED | OUTPATIENT
Start: 2023-01-13 | End: 2023-01-20

## 2023-01-13 NOTE — TELEPHONE ENCOUNTER
We just need clarification on the directions  And so take one capsule by mouth every 24 hours for seven days on dialysis days and then it says take after dialysis  Are they only using it on days they've had dialysis or they using it for the seven days after dialysis?

## 2023-01-13 NOTE — PROGRESS NOTES
Assessment/Plan:    Problem List Items Addressed This Visit        Endocrine    End stage renal disease on dialysis due to type 1 diabetes mellitus (HCC)    Relevant Medications    NovoLOG 100 UNIT/ML injection   Other Visit Diagnoses     Acute non-recurrent maxillary sinusitis    -  Primary    -10d h/o cough, nasal congestion  -start renal dose of amoxicillin, side effects discussed  -also recommend ocean nasal spray    Relevant Medications    amoxicillin (AMOXIL) 500 mg capsule          Subjective:      Patient ID: Yoandy Pugh is a 42 y o  male  HPI  He is accompanied by his mom  He went to HD on 10 days ago  Initially sore throat, sinus congestion, productive cough  Had vomiting episode x1  Has had nausea, taken zofran  He has felt more fatigued than usual   No fever  Home COVID test x1 four days after sx began  Had several episodes of diarrhea  Nieces and nephews were ill  Recently his R shoulder has been sore        The following portions of the patient's history were reviewed and updated as appropriate: allergies, current medications, past family history, past medical history, past social history, past surgical history and problem list       Current Outpatient Medications:   •  amoxicillin (AMOXIL) 500 mg capsule, Take 1 capsule (500 mg total) by mouth every 24 hours for 7 days On dialysis days take after dialysis, Disp: 7 capsule, Rfl: 0  •  aspirin (ECOTRIN LOW STRENGTH) 81 mg EC tablet, Take 81 mg by mouth daily, Disp: , Rfl:   •  atorvastatin (LIPITOR) 40 mg tablet, Take 1 tablet (40 mg total) by mouth every evening, Disp: 90 tablet, Rfl: 3  •  b complex vitamins capsule, Take 1 capsule by mouth daily before lunch , Disp: , Rfl:   •  B-D ULTRAFINE III SHORT PEN 31G X 8 MM MISC, USE 1 PEN NEEDLE 8 TIMES DAILY, Disp: 100 each, Rfl: 47  •  calcium acetate (PHOSLO) capsule, TAKE 3 CAPSULES BY MOUTH WITH MEALS, Disp: , Rfl:   •  cholecalciferol (VITAMIN D3) 1,000 units tablet, Take 2,000 Units by mouth daily  , Disp: , Rfl:   •  cinacalcet (SENSIPAR) 60 MG tablet, Take 120 mg by mouth daily 2 tab daily , Disp: , Rfl:   •  cloNIDine (CATAPRES-TTS-3) 0 3 mg/24 hr, 1 patch once a week , Disp: , Rfl:   •  doxazosin (CARDURA) 2 mg tablet, TAKE 2 TABLETS BY MOUTH IN THE MORNING AND 2 IN THE EVENING, Disp: 120 tablet, Rfl: 0  •  escitalopram (LEXAPRO) 20 mg tablet, Take 1 tablet (20 mg total) by mouth daily, Disp: 90 tablet, Rfl: 2  •  famotidine (PEPCID) 40 MG tablet, Take 1 tablet (40 mg total) by mouth daily, Disp: 90 tablet, Rfl: 1  •  gabapentin (NEURONTIN) 100 mg capsule, Take 2 tablets at bedtime, Disp: 60 capsule, Rfl: 5  •  GLUCAGON EMERGENCY 1 MG injection, , Disp: , Rfl: 3  •  Gvoke HypoPen 1-Pack 1 MG/0 2ML SOAJ, , Disp: , Rfl:   •  hydrOXYzine HCL (ATARAX) 10 mg tablet, Take 1 tablet (10 mg total) by mouth every 6 (six) hours as needed for itching or anxiety, Disp: 30 tablet, Rfl: 3  •  insulin glargine (Basaglar KwikPen) 100 units/mL injection pen, Inject 7 Units under the skin daily at bedtime, Disp: 3 mL, Rfl: 0  •  labetalol (NORMODYNE) 200 mg tablet, TAKE 2 TABLETS BY MOUTH THREE TIMES DAILY, Disp: 180 tablet, Rfl: 0  •  levothyroxine 25 mcg tablet, Take 1 tablet (25 mcg total) by mouth daily, Disp: 30 tablet, Rfl: 1  •  lisinopril (ZESTRIL) 40 mg tablet, Take 80 mg by mouth daily, Disp: , Rfl:   •  metolazone (ZAROXOLYN) 10 mg tablet, Take 5 mg by mouth daily, Disp: , Rfl:   •  minoxidil (LONITEN) 2 5 mg tablet, Take 2 5 mg by mouth 2 (two) times a day, Disp: , Rfl:   •  mupirocin (BACTROBAN) 2 % ointment, Apply topically 3 (three) times a day Until lesion on right hand heals  , Disp: 30 g, Rfl: 4  •  NIFEdipine ER (ADALAT CC) 60 MG 24 hr tablet, Take 1 tablet (60 mg total) by mouth 2 (two) times a day, Disp: 180 tablet, Rfl: 0  •  NovoLOG 100 UNIT/ML injection, INJECT UP TO 50 UNITS DAILY IN INSULIN PUMP, Disp: , Rfl:   •  NovoLOG FlexPen ReliOn 100 UNIT/ML injection pen, , Disp: , Rfl: •  ondansetron (ZOFRAN) 4 mg tablet, TAKE 1 TABLET BY MOUTH EVERY 8 HOURS AS NEEDED FOR NAUSEA FOR VOMITING, Disp: 90 tablet, Rfl: 0  •  Patiromer Sorbitex Calcium (VELTASSA PO), Take 5 g by mouth once a week, Disp: , Rfl:   •  SPS 15 GM/60ML suspension, TAKE 60 ML BY MOUTH SINGLE DOSE FOR EMERGENCY USE DURING MISSED HEMODIALYSIS, Disp: , Rfl:   •  torsemide (DEMADEX) 100 mg tablet, Take 100 mg by mouth daily  , Disp: , Rfl:   •  traZODone (DESYREL) 50 mg tablet, Take 0 5 tablets (25 mg total) by mouth daily at bedtime as needed for sleep, Disp: 30 tablet, Rfl: 3  •  triamcinolone (KENALOG) 0 1 % ointment, Apply topically 2 (two) times a day For up to 14 days on the itchy areas  Avoid groin, underarms and face  It is important to take at least 1 week break between uses  , Disp: 454 g, Rfl: 0  •  clotrimazole (LOTRIMIN) 1 % cream, Apply to affected area 2 times daily for 2 weeks (Patient not taking: Reported on 12/14/2022), Disp: 15 g, Rfl: 0  •  naproxen (Naprosyn) 500 mg tablet, Take 1 tablet (500 mg total) by mouth 2 (two) times a day with meals for 7 days, Disp: 14 tablet, Rfl: 0  •  ofloxacin (FLOXIN) 0 3 % otic solution, Administer 10 drops to the right ear daily (Patient not taking: Reported on 12/14/2022), Disp: 5 mL, Rfl: 0    Review of Systems   Constitutional: Positive for appetite change and fatigue  HENT: Positive for congestion and postnasal drip  Negative for ear discharge and sore throat  Respiratory: Positive for cough (productive)  Negative for shortness of breath and wheezing  Gastrointestinal: Positive for diarrhea  Negative for abdominal pain  Musculoskeletal: Positive for arthralgias  Negative for myalgias  Neurological: Negative for headaches           Objective:    /80 (BP Location: Right arm, Patient Position: Sitting)   Pulse 70   Temp 98 3 °F (36 8 °C) (Tympanic)   Ht 5' 11" (1 803 m)   Wt 97 3 kg (214 lb 9 6 oz)   SpO2 98%   BMI 29 93 kg/m²      Physical Exam  Vitals reviewed  HENT:      Head: Normocephalic  Right Ear: Tympanic membrane and ear canal normal       Left Ear: Tympanic membrane and ear canal normal       Nose: Rhinorrhea present  No congestion  Mouth/Throat:      Mouth: Mucous membranes are moist       Pharynx: Oropharynx is clear  No oropharyngeal exudate or posterior oropharyngeal erythema  Cardiovascular:      Rate and Rhythm: Normal rate and regular rhythm  Pulses: Normal pulses  Heart sounds: Normal heart sounds  Pulmonary:      Effort: Pulmonary effort is normal  No respiratory distress  Breath sounds: Normal breath sounds  No wheezing  Musculoskeletal:      Cervical back: No tenderness  Lymphadenopathy:      Cervical: No cervical adenopathy  Neurological:      Mental Status: He is alert and oriented to person, place, and time

## 2023-01-15 ENCOUNTER — HOSPITAL ENCOUNTER (EMERGENCY)
Facility: HOSPITAL | Age: 38
Discharge: HOME | End: 2023-01-15
Attending: EMERGENCY MEDICINE

## 2023-01-15 VITALS
OXYGEN SATURATION: 100 % | HEART RATE: 75 BPM | TEMPERATURE: 98.6 F | DIASTOLIC BLOOD PRESSURE: 72 MMHG | RESPIRATION RATE: 18 BRPM | SYSTOLIC BLOOD PRESSURE: 197 MMHG

## 2023-01-15 DIAGNOSIS — I10 UNCONTROLLED HYPERTENSION: ICD-10-CM

## 2023-01-15 DIAGNOSIS — N18.6 ESRD (END STAGE RENAL DISEASE) (HCC): ICD-10-CM

## 2023-01-15 DIAGNOSIS — I16.0 HYPERTENSIVE URGENCY: ICD-10-CM

## 2023-01-15 LAB
ALBUMIN SERPL BCP-MCNC: 3.9 G/DL (ref 3.5–5)
ALP SERPL-CCNC: 89 U/L (ref 34–104)
ALT SERPL W P-5'-P-CCNC: 11 U/L (ref 7–52)
ANION GAP SERPL CALCULATED.3IONS-SCNC: 10 MMOL/L (ref 4–13)
AST SERPL W P-5'-P-CCNC: 16 U/L (ref 13–39)
BASOPHILS # BLD AUTO: 0.05 THOUSANDS/ÂΜL (ref 0–0.1)
BASOPHILS NFR BLD AUTO: 1 % (ref 0–1)
BILIRUB SERPL-MCNC: 0.64 MG/DL (ref 0.2–1)
BUN SERPL-MCNC: 23 MG/DL (ref 5–25)
CALCIUM SERPL-MCNC: 8.1 MG/DL (ref 8.4–10.2)
CARDIAC TROPONIN I PNL SERPL HS: 17 NG/L
CHLORIDE SERPL-SCNC: 97 MMOL/L (ref 96–108)
CO2 SERPL-SCNC: 32 MMOL/L (ref 21–32)
CREAT SERPL-MCNC: 7.48 MG/DL (ref 0.6–1.3)
EOSINOPHIL # BLD AUTO: 0.44 THOUSAND/ÂΜL (ref 0–0.61)
EOSINOPHIL NFR BLD AUTO: 7 % (ref 0–6)
ERYTHROCYTE [DISTWIDTH] IN BLOOD BY AUTOMATED COUNT: 13.2 % (ref 11.6–15.1)
GFR SERPL CREATININE-BSD FRML MDRD: 8 ML/MIN/1.73SQ M
GLUCOSE SERPL-MCNC: 218 MG/DL (ref 65–140)
HCT VFR BLD AUTO: 28.5 % (ref 36.5–49.3)
HGB BLD-MCNC: 9.4 G/DL (ref 12–17)
IMM GRANULOCYTES # BLD AUTO: 0.03 THOUSAND/UL (ref 0–0.2)
IMM GRANULOCYTES NFR BLD AUTO: 1 % (ref 0–2)
LYMPHOCYTES # BLD AUTO: 1.48 THOUSANDS/ÂΜL (ref 0.6–4.47)
LYMPHOCYTES NFR BLD AUTO: 23 % (ref 14–44)
MCH RBC QN AUTO: 32.3 PG (ref 26.8–34.3)
MCHC RBC AUTO-ENTMCNC: 33 G/DL (ref 31.4–37.4)
MCV RBC AUTO: 98 FL (ref 82–98)
MONOCYTES # BLD AUTO: 0.4 THOUSAND/ÂΜL (ref 0.17–1.22)
MONOCYTES NFR BLD AUTO: 6 % (ref 4–12)
NEUTROPHILS # BLD AUTO: 3.95 THOUSANDS/ÂΜL (ref 1.85–7.62)
NEUTS SEG NFR BLD AUTO: 62 % (ref 43–75)
NRBC BLD AUTO-RTO: 0 /100 WBCS
PLATELET # BLD AUTO: 245 THOUSANDS/UL (ref 149–390)
PMV BLD AUTO: 9.3 FL (ref 8.9–12.7)
POTASSIUM SERPL-SCNC: 4.4 MMOL/L (ref 3.5–5.3)
PROT SERPL-MCNC: 6.6 G/DL (ref 6.4–8.4)
RBC # BLD AUTO: 2.91 MILLION/UL (ref 3.88–5.62)
SODIUM SERPL-SCNC: 139 MMOL/L (ref 135–147)
WBC # BLD AUTO: 6.35 THOUSAND/UL (ref 4.31–10.16)

## 2023-01-15 RX ORDER — LABETALOL HYDROCHLORIDE 5 MG/ML
10 INJECTION, SOLUTION INTRAVENOUS ONCE
Status: COMPLETED | OUTPATIENT
Start: 2023-01-15 | End: 2023-01-15

## 2023-01-15 RX ORDER — HYDRALAZINE HYDROCHLORIDE 25 MG/1
25 TABLET, FILM COATED ORAL 3 TIMES DAILY
Qty: 30 TABLET | Refills: 0 | Status: SHIPPED | OUTPATIENT
Start: 2023-01-15 | End: 2023-01-27 | Stop reason: SDUPTHER

## 2023-01-15 RX ORDER — NIFEDIPINE 90 MG/1
90 TABLET, EXTENDED RELEASE ORAL DAILY
Qty: 30 TABLET | Refills: 0 | Status: SHIPPED | OUTPATIENT
Start: 2023-01-15 | End: 2023-01-15 | Stop reason: SDUPTHER

## 2023-01-15 RX ORDER — NIFEDIPINE 90 MG/1
90 TABLET, EXTENDED RELEASE ORAL DAILY
Qty: 30 TABLET | Refills: 0 | Status: SHIPPED | OUTPATIENT
Start: 2023-01-15

## 2023-01-15 RX ORDER — HYDRALAZINE HYDROCHLORIDE 20 MG/ML
10 INJECTION INTRAMUSCULAR; INTRAVENOUS ONCE
Status: COMPLETED | OUTPATIENT
Start: 2023-01-15 | End: 2023-01-15

## 2023-01-15 RX ORDER — LABETALOL HYDROCHLORIDE 5 MG/ML
10 INJECTION, SOLUTION INTRAVENOUS ONCE
Status: DISCONTINUED | OUTPATIENT
Start: 2023-01-15 | End: 2023-01-15

## 2023-01-15 RX ADMIN — LABETALOL HYDROCHLORIDE 10 MG: 5 INJECTION, SOLUTION INTRAVENOUS at 15:06

## 2023-01-15 RX ADMIN — HYDRALAZINE HYDROCHLORIDE 10 MG: 20 INJECTION INTRAMUSCULAR; INTRAVENOUS at 16:27

## 2023-01-15 NOTE — DISCHARGE INSTRUCTIONS
Increase nifedipine dosage at night to 90 mg instead of the 60  Begin taking hydralazine 3 times a day  Take your next dosage before going to bed tonight  Follow-up with nephrology

## 2023-01-16 LAB
ATRIAL RATE: 71 BPM
P AXIS: 58 DEGREES
PR INTERVAL: 174 MS
QRS AXIS: 54 DEGREES
QRSD INTERVAL: 78 MS
QT INTERVAL: 462 MS
QTC INTERVAL: 502 MS
T WAVE AXIS: 33 DEGREES
VENTRICULAR RATE: 71 BPM

## 2023-01-18 ENCOUNTER — OFFICE VISIT (OUTPATIENT)
Dept: INTERNAL MEDICINE CLINIC | Facility: CLINIC | Age: 38
End: 2023-01-18

## 2023-01-18 VITALS
SYSTOLIC BLOOD PRESSURE: 142 MMHG | HEIGHT: 70 IN | BODY MASS INDEX: 30.61 KG/M2 | HEART RATE: 75 BPM | DIASTOLIC BLOOD PRESSURE: 92 MMHG | WEIGHT: 213.8 LBS | OXYGEN SATURATION: 98 % | TEMPERATURE: 98.2 F

## 2023-01-18 DIAGNOSIS — E10.22 END STAGE RENAL DISEASE ON DIALYSIS DUE TO TYPE 1 DIABETES MELLITUS (HCC): ICD-10-CM

## 2023-01-18 DIAGNOSIS — F41.1 GENERALIZED ANXIETY DISORDER: ICD-10-CM

## 2023-01-18 DIAGNOSIS — E10.22 TYPE 1 DIABETES MELLITUS WITH CHRONIC KIDNEY DISEASE ON CHRONIC DIALYSIS (HCC): ICD-10-CM

## 2023-01-18 DIAGNOSIS — Z99.2 END STAGE RENAL DISEASE ON DIALYSIS DUE TO TYPE 1 DIABETES MELLITUS (HCC): ICD-10-CM

## 2023-01-18 DIAGNOSIS — N18.6 END STAGE RENAL DISEASE ON DIALYSIS DUE TO TYPE 1 DIABETES MELLITUS (HCC): ICD-10-CM

## 2023-01-18 DIAGNOSIS — I15.0 RENOVASCULAR HYPERTENSION: Primary | ICD-10-CM

## 2023-01-18 DIAGNOSIS — Z99.2 TYPE 1 DIABETES MELLITUS WITH CHRONIC KIDNEY DISEASE ON CHRONIC DIALYSIS (HCC): ICD-10-CM

## 2023-01-18 DIAGNOSIS — R91.8 MULTIPLE PULMONARY NODULES: ICD-10-CM

## 2023-01-18 DIAGNOSIS — E10.42 DIABETIC POLYNEUROPATHY ASSOCIATED WITH TYPE 1 DIABETES MELLITUS (HCC): ICD-10-CM

## 2023-01-18 DIAGNOSIS — N18.6 TYPE 1 DIABETES MELLITUS WITH CHRONIC KIDNEY DISEASE ON CHRONIC DIALYSIS (HCC): ICD-10-CM

## 2023-01-18 DIAGNOSIS — R42 VERTIGO: ICD-10-CM

## 2023-01-18 DIAGNOSIS — Z00.00 MEDICARE ANNUAL WELLNESS VISIT, SUBSEQUENT: ICD-10-CM

## 2023-01-18 DIAGNOSIS — F33.1 MODERATE EPISODE OF RECURRENT MAJOR DEPRESSIVE DISORDER (HCC): ICD-10-CM

## 2023-01-18 PROBLEM — E16.2 HYPOGLYCEMIA: Status: RESOLVED | Noted: 2022-07-15 | Resolved: 2023-01-18

## 2023-01-18 PROBLEM — G62.9 PERIPHERAL POLYNEUROPATHY: Status: RESOLVED | Noted: 2019-11-20 | Resolved: 2023-01-18

## 2023-01-18 RX ORDER — ONDANSETRON 4 MG/1
4 TABLET, FILM COATED ORAL EVERY 8 HOURS PRN
Qty: 90 TABLET | Refills: 0 | Status: SHIPPED | OUTPATIENT
Start: 2023-01-18

## 2023-01-18 RX ORDER — INSULIN PMP CART,AUT,G6/7,CNTR
EACH SUBCUTANEOUS
COMMUNITY
Start: 2023-01-17

## 2023-01-18 RX ORDER — GABAPENTIN 100 MG/1
CAPSULE ORAL
Qty: 180 CAPSULE | Refills: 1 | Status: SHIPPED | OUTPATIENT
Start: 2023-01-18

## 2023-01-18 NOTE — ASSESSMENT & PLAN NOTE
-difficult to stabilize  -current regimen now clonidine 0 3mg patch, labetalol 400mg TID, nifedipine 90mg daily, lisinopril 40mg bid, torsemide 100mg daily and hydralazine 25mg TID  -follow up with Nephro

## 2023-01-18 NOTE — ASSESSMENT & PLAN NOTE
-DM1 with neuropathy, gastroparesis and ESRD on HD  -continue insulin regimen as per [facility] Endo  -labs ordered by Endo  -has noticed spike in blood sugars at times after HD  -continue gabapentin for neuropathy

## 2023-01-18 NOTE — PROGRESS NOTES
Assessment and Plan:     Problem List Items Addressed This Visit        Endocrine    Type 1 diabetes mellitus ([place] [address] )     -DM1 with neuropathy, gastroparesis and ESRD on HD  -continue insulin regimen as per [facility]  -labs ordered by Endo  -has noticed spike in blood sugars at times after HD  -continue gabapentin for neuropathy         Diabetic neuropathy (HCC)     -gabapentin 100mg two tabs renewed           Relevant Medications    gabapentin (NEURONTIN) 100 mg capsule    End stage renal disease on dialysis due to type 1 diabetes mellitus ([place] [address] )     -on HD T/Th/Sat with [facility]            Cardiovascular and Mediastinum    Renovascular hypertension - Primary     -difficult to stabilize  -current regimen now clonidine 0 3mg patch, labetalol 400mg TID, nifedipine 90mg daily, lisinopril 40mg bid, torsemide 100mg daily and hydralazine 25mg TID  -follow up with Nephro            Other    Multiple pulmonary nodules     -largest LLL 7mm nodule being monitored  -surveillance CT chest due 3/2023         Vertigo    Relevant Medications    ondansetron (ZOFRAN) 4 mg tablet    Moderate episode of recurrent major depressive disorder ([place] [address] )     -moderately severe  -continue escitalopram 20mg daily  -followed by therapist and psychiatrist         Generalized anxiety disorder     -continue escitalopram 20mg daily  -followed by therapist and psychiatrist        Other Visit Diagnoses     Medicare annual wellness visit, subsequent            BMI Counseling: Body mass index is 31 12 kg/m²  The BMI is above normal  Nutrition recommendations include decreasing portion sizes, consuming healthier snacks, limiting drinks that contain sugar, moderation in carbohydrate intake and reducing intake of cholesterol  Exercise recommendations include exercising 3-5 times per week and strength training exercises  No pharmacotherapy was ordered  Rationale for BMI follow-up plan is due to patient being overweight or obese         Preventive health issues were discussed with patient, and age appropriate screening tests were ordered as noted in patient's After Visit Summary  Personalized health advice and appropriate referrals for health education or preventive services given if needed, as noted in patient's After Visit Summary  History of Present Illness:     Patient presents for a Medicare Wellness Visit    42yo male with DM1 with neuropathy, gastroparesis, ESRD on HD, nonobstructive CAD, renovascular HTN with h/o CVA, RACHEAL, hypothyroidism, MDD, ELIAS, vitamin D def, anemia and pulmonary nodules here for follow up care  He is accompanied by his mother  Requests refill of gabapentin and zofran which he infrequently uses  He has noticed his blood sugars would often come up after dialysis but not always  He has been afraid to give extra insulin when it occurs  He is completing course of amoxicillin for sinusitis  He is much improved  Went to HD on Sat, was feeling ill the week before and was not eating well  His dry weight was down and only small amount of fluid was removed  SBP at discharge at dialysis was 196  Dose of lisinopril was doubled and took night time pills early  Presented to ED the next day when sbp was 208 and treated with antihypertensives  He went to HD yesterday    Home BP around 140/90s    PHQ-2/9 Depression Screening    Little interest or pleasure in doing things: 2 - more than half the days  Feeling down, depressed, or hopeless: 2 - more than half the days  Trouble falling or staying asleep, or sleeping too much: 2 - more than   half the days  Feeling tired or having little energy: 3 - nearly every day  Poor appetite or overeatin - more than half the days  Feeling bad about yourself - or that you are a failure or have let   yourself or your family down: 3 - nearly every day  Trouble concentrating on things, such as reading the newspaper or watching   television: 1 - several days  Moving or speaking so slowly that other people could have noticed  Or the   opposite - being so fidgety or restless that you have been moving around a   lot more than usual: 0 - not at all  Thoughts that you would be better off dead, or of hurting yourself in some   way: 0 - not at all  PHQ-9 Score: 15   PHQ-9 Interpretation: Moderately severe depression           Patient Care Team:  Yvonne Martínez, DO as PCP - [facility], DO as PCP - [address] [facility],[address] (RTE)  Loc Banerjee MD (Neurology)  Yariel Osullivan MD (Ophthalmology)  Jim Hernandez MD (Gastroenterology)  Joyce Mello MD (Internal Medicine)  Ying Rosa MD (Ophthalmology)  Dayton Erazo MD (Pulmonary Disease)  Julia Self MD (Cardiology)  Shan De La Fuente MD (Internal Medicine)  Maranda Lehman MD (Nephrology)  Esequiel Silva DPM (Podiatry)     Review of Systems:     Review of Systems   Constitutional: Positive for appetite change  Negative for unexpected weight change  HENT: Positive for congestion, postnasal drip and rhinorrhea  Respiratory: Positive for cough  Negative for chest tightness, shortness of breath and wheezing  Cardiovascular: Positive for leg swelling  Negative for chest pain and palpitations  Gastrointestinal: Negative for abdominal pain, constipation, diarrhea, nausea and vomiting  Neurological: Positive for headaches  Negative for dizziness  Psychiatric/Behavioral: Positive for dysphoric mood  The patient is not nervous/anxious           Problem List:     Patient Active Problem List   Diagnosis   • Type 1 diabetes mellitus (HCC)   • History of lacunar cerebrovascular accident (CVA)   • 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,     • Binocular visual disturbance   • Hyperphosphatemia   • Vitamin D deficiency   • Homozygous MTHFR mutation [id number]   • Anemia in chronic kidney disease, on chronic dialysis (HCC)   • Persistent proteinuria   • Ataxia   • Esophagitis   • Left atrial dilation   • Renovascular hypertension   • 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 ([facility])   • Dyslipidemia   • Strabismus   • Environmental and seasonal allergies   • Pruritus   • Gastroparesis diabeticorum ([facility])   • Multiple pulmonary nodules   • Vertigo   • Impaired mobility and ADLs   • Diabetic neuropathy ([facility])   • Provoked seizure ([facility])   • Asterixis   • Foot drop, bilateral   • Secondary hyperparathyroidism (HCC)   • Eosinophilic leukocytosis   • End stage renal disease on dialysis due to type 1 diabetes mellitus (HCC)   • Tubulovillous adenoma of colon   • Gastroesophageal reflux disease without esophagitis   • Medical cannabis use   • Numbness of arm   • Moderate episode of recurrent major depressive disorder ([facility])   • Generalized anxiety disorder   • Hypothyroidism   • Coronary artery disease involving native coronary artery of native heart without angina pectoris   • Pulmonary HTN ([facility])   • Status post placement of implantable loop recorder   • RACHEAL (obstructive sleep apnea)   • Traumatic onycholysis      Past Medical and Surgical History:     Past Medical History:   Diagnosis Date   • Acute kidney injury ([facility] [address] )    • Ambulates with cane    • Anuria    • Anxiety    • Cellulitis of right elbow 03/31/2021   • Chronic kidney disease    • Depression    • Diabetes mellitus ([facility] [address] )    • Diarrhea    • Emesis 10/24/2020   • End stage renal disease ([facility] [address] ) 02/11/2018    Formatting of this note might be different from the original  Last Assessment & Plan:  Secondary to DM  On nightly PD  Followed by Nephro    Patient considering transplant for kidney and pancreas through [address] of this note might be different from the original  Last Assessment & Plan:  Formatting of this note might be different from the original  Lab Results  Component Value Date   EGFR    • Falls    • Gastroparesis    • GERD (gastroesophageal reflux disease)    • History of shingles 2010   • History of transfusion 02/2018    no adverse reaction   • Hyperlipidemia • Hyperphosphatemia    • Hypertension    • Hypoglycemia 7/15/2022   • Itching    • Mastoiditis of right side 07/15/2022   • Muscle weakness     general unsteadiness   • Obesity (BMI 30 0-34 9) 09/09/2019   • Orthostatic hypotension 10/25/2020   • Peripheral polyneuropathy 11/20/2019   • PONV (postoperative nausea and vomiting) 01/26/2018   • Protein-calorie malnutrition ([address] ) 11/23/2020   • Recurrent peritonitis ([address] ) due to peritoneal dialysis catheter 07/31/2020   • Retinopathy    • Seizures ([address] )     early 2020 - one time   • Skin abnormality     some dime size areas where skin was scratched from itching   • Spontaneous bacterial peritonitis ([address] ) 10/19/2020   • Squamous cell skin cancer     left temple   • Stroke ([address] )     x2 - off balance/no driving/fatigue   • Swelling of both lower extremities    • Vomiting    • Wears glasses      Past Surgical History:   Procedure Laterality Date   • CARDIAC LOOP RECORDER  05/2018   • COLONOSCOPY     • EGD     • EYE SURGERY Right    • IR AV FISTULAGRAM/GRAFTOGRAM  02/23/2021   • IR TUNNELED CENTRAL LINE PLACEMENT  02/16/2021   • IR TUNNELED DIALYSIS CATHETER PLACEMENT  11/18/2020   • IR TUNNELED DIALYSIS CATHETER REMOVAL  02/12/2021   • IR TUNNELED DIALYSIS CATHETER REMOVAL  03/11/2021   • MOHS SURGERY Left 12/14/2022    Left temple with Dr Francesca Victor   • [address] N/A 08/27/2018    Procedure: UNROOF PD CATHETER;  Surgeon: Lurdes Lynch DO;  [facility]: [facility];  Service: General   • VA ARTERIOVENOUS ANASTOMOSIS OPEN DIRECT Left 11/09/2020    Procedure: CREATION FISTULA  ARTERIOVENOUS (AV) - LEFT WRIST;  Surgeon: Rosibel Dominguez MD;  Location: [facility];  Service: Vascular   • VA ESOPHAGOGASTRODUODENOSCOPY TRANSORAL DIAGNOSTIC N/A 04/18/2019    Procedure: ESOPHAGOGASTRODUODENOSCOPY (EGD); Surgeon: Mati Kelly MD;  Location: [facility];   Service: Gastroenterology   • VA LAPS INSERTION TUNNELED INTRAPERITONEAL CATHETER N/A 08/06/2018    Procedure: LAPAROSCOPIC PD CATHETER PLACEMENT;  Surgeon: Merry Gutierrez DO;  Location: [facility];  Service: General   • CO REMOVAL TUNNELED INTRAPERITONEAL CATHETER N/A 2020    Procedure: REMOVAL CATHETER PERITONEAL DIALYSIS;  Surgeon: Ritu Torres MD;  [facility];  Service: General   • TONSILLECTOMY     • UPPER GASTROINTESTINAL ENDOSCOPY        Family History:     Family History   Problem Relation Age of Onset   • Breast cancer Mother    • Hypertension Mother    • Hyperlipidemia Father    • Hypertension Father    • Leukemia Maternal Grandmother    • Hyperlipidemia Maternal Grandfather    • Hypertension Maternal Grandfather    • Hyperlipidemia Paternal Grandmother    • Hypertension Paternal Grandmother    • Heart disease Paternal Grandfather         cardiac disorder   • Diabetes Paternal Grandfather       Social History:     Social History     Socioeconomic History   • Marital status: Single     Spouse name: None   • Number of children: None   • Years of education: None   • Highest education level: None   Occupational History   • 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   Tobacco Use   • Smoking status: Former     Packs/day: 0 50     Years: 12 00     Pack years: 6 00     Types: Cigarettes     Quit date: 2018     Years since quittin 9   • Smokeless tobacco: Never   • Tobacco comments:     quit 2018   Vaping Use   • Vaping Use: Every day   • Substances: THC, CBD   Substance and Sexual Activity   • Alcohol use: Not Currently     Comment: occassionally   • Drug use: Yes     Types: Marijuana     Comment: medical marijuana   • Sexual activity: None   Other Topics Concern   • None   Social History Narrative    Caffeine use    single     Social Determinants of Health     Financial Resource Strain: Low Risk    • Difficulty of Paying Living Expenses: Not hard at all   Food Insecurity: Not on file   Transportation Needs: No Transportation Needs   • Lack of Transportation (Medical):  No   • Lack of Transportation (Non-Medical):  No   Physical Activity: Not on file   Stress: Not on file   Social Connections: Not on file   Intimate Partner Violence: Not on file   Housing Stability: Not on file      Medications and Allergies:     Current Outpatient Medications   Medication Sig Dispense Refill   • amoxicillin (AMOXIL) 500 mg capsule Take 1 capsule (500 mg total) by mouth every 24 hours for 7 days On dialysis days take after dialysis 7 capsule 0   • aspirin (ECOTRIN LOW STRENGTH) 81 mg EC tablet Take 81 mg by mouth daily     • atorvastatin (LIPITOR) 40 mg tablet Take 1 tablet (40 mg total) by mouth every evening 90 tablet 3   • b complex vitamins capsule Take 1 capsule by mouth daily before lunch      • B-D ULTRAFINE III SHORT PEN 31G X 8 MM MISC USE 1 PEN NEEDLE 8 TIMES DAILY 100 each 47   • calcium acetate (PHOSLO) capsule TAKE 3 CAPSULES BY MOUTH WITH MEALS     • cholecalciferol (VITAMIN D3) 1,000 units tablet Take 2,000 Units by mouth daily       • cinacalcet (SENSIPAR) 60 MG tablet Take 120 mg by mouth daily 2 tab daily      • cloNIDine (CATAPRES-TTS-3) 0 3 mg/24 hr 1 patch once a week      • clotrimazole (LOTRIMIN) 1 % cream Apply to affected area 2 times daily for 2 weeks 15 g 0   • doxazosin (CARDURA) 2 mg tablet TAKE 2 TABLETS BY MOUTH IN THE MORNING AND 2 IN THE EVENING 120 tablet 0   • escitalopram (LEXAPRO) 20 mg tablet Take 1 tablet (20 mg total) by mouth daily 90 tablet 2   • famotidine (PEPCID) 40 MG tablet Take 1 tablet (40 mg total) by mouth daily 90 tablet 1   • gabapentin (NEURONTIN) 100 mg capsule Take 2 tablets at bedtime 180 capsule 1   • GLUCAGON EMERGENCY 1 MG injection   3   • Gvoke HypoPen 1-Pack 1 MG/0 2ML SOAJ      • hydrALAZINE (APRESOLINE) 25 mg tablet Take 1 tablet (25 mg total) by mouth 3 (three) times a day 30 tablet 0   • hydrOXYzine HCL (ATARAX) 10 mg tablet Take 1 tablet (10 mg total) by mouth every 6 (six) hours as needed for itching or anxiety 30 tablet 3   • Insulin Disposable Pump (Omnipod 5 G6 Intro, Gen 5,) KIT      • insulin glargine (Basaglar KwikPen) 100 units/mL injection pen Inject 7 Units under the skin daily at bedtime 3 mL 0   • labetalol (NORMODYNE) 200 mg tablet TAKE 2 TABLETS BY MOUTH THREE TIMES DAILY 180 tablet 0   • levothyroxine 25 mcg tablet Take 1 tablet (25 mcg total) by mouth daily 30 tablet 1   • lisinopril (ZESTRIL) 40 mg tablet Take 80 mg by mouth daily     • metolazone (ZAROXOLYN) 10 mg tablet Take 5 mg by mouth daily     • minoxidil (LONITEN) 2 5 mg tablet Take 2 5 mg by mouth 2 (two) times a day     • mupirocin (BACTROBAN) 2 % ointment Apply topically 3 (three) times a day Until lesion on right hand heals  30 g 4   • NIFEdipine (PROCARDIA XL) 90 mg 24 hr tablet Take 1 tablet (90 mg total) by mouth daily 30 tablet 0   • NIFEdipine ER (ADALAT CC) 60 MG 24 hr tablet Take 1 tablet (60 mg total) by mouth 2 (two) times a day 180 tablet 0   • NovoLOG 100 UNIT/ML injection INJECT UP TO 50 UNITS DAILY IN INSULIN PUMP     • NovoLOG FlexPen ReliOn 100 UNIT/ML injection pen      • ofloxacin (FLOXIN) 0 3 % otic solution Administer 10 drops to the right ear daily 5 mL 0   • ondansetron (ZOFRAN) 4 mg tablet Take 1 tablet (4 mg total) by mouth every 8 (eight) hours as needed for nausea or vomiting 90 tablet 0   • Patiromer Sorbitex Calcium (VELTASSA PO) Take 5 g by mouth once a week     • SPS 15 GM/60ML suspension TAKE 60 ML BY MOUTH SINGLE DOSE FOR EMERGENCY USE DURING MISSED HEMODIALYSIS     • torsemide (DEMADEX) 100 mg tablet Take 100 mg by mouth daily       • traZODone (DESYREL) 50 mg tablet Take 0 5 tablets (25 mg total) by mouth daily at bedtime as needed for sleep 30 tablet 3   • triamcinolone (KENALOG) 0 1 % ointment Apply topically 2 (two) times a day For up to 14 days on the itchy areas  Avoid groin, underarms and face  It is important to take at least 1 week break between uses   454 g 0   • naproxen (Naprosyn) 500 mg tablet Take 1 tablet (500 mg total) by mouth 2 (two) times a day with meals for 7 days 14 tablet 0     No current facility-administered medications for this visit  Allergies   Allergen Reactions   • Sulfa Antibiotics Rash      Immunizations:     Immunization History   Administered Date(s) Administered   • COVID-19 PFIZER VACCINE 0 3 ML IM 03/02/2021, 03/22/2021, 10/13/2021, 05/26/2022   • COVID-19 Pfizer Vac BIVALENT Alonso-sucrose 12 Yr+ IM (BOOSTER ONLY) 10/19/2022   • INFLUENZA 01/22/2018, 10/07/2019   • Influenza Quadrivalent 3 years and older 10/31/2019   • Influenza Quadrivalent Preservative Free 3 years and older IM 02/05/2018   • Influenza, injectable, quadrivalent, preservative free 0 5 mL 09/24/2018, 10/05/2021   • Influenza, recombinant, quadrivalent,injectable, preservative free 09/30/2020, 09/13/2022   • Influenza, seasonal, injectable 11/10/2015   • Influenza, seasonal, injectable, preservative free 09/12/2017, 09/24/2018, 09/30/2020, 10/05/2021   • Pneumococcal Polysaccharide PPV23 08/31/2018, 03/08/2019   • Tdap 03/08/2019   • Tuberculin Skin Test-PPD Intradermal 08/29/2018, 09/03/2019      Health Maintenance:         Topic Date Due   • HIV Screening  Never done   • Colorectal Cancer Screening  06/03/2024   • Hepatitis C Screening  Completed         Topic Date Due   • Hepatitis A Vaccine (1 of 2 - Risk 2-dose series) Never done   • Pneumococcal Vaccine: Pediatrics (0 to 5 Years) and At-Risk Patients (6 to 59 Years) (3 - PCV) 03/08/2020   • COVID-19 Vaccine (5 - Booster for Nolan Peter series) 07/21/2022      Medicare Screening Tests and Risk Assessments:     Chrys Habermann is here for his Subsequent Wellness visit  Last Medicare Wellness visit information reviewed, patient interviewed and updates made to the record today  Health Risk Assessment:   Patient rates overall health as poor  Patient feels that their physical health rating is slightly better  Patient is dissatisfied with their life  Eyesight was rated as same   Hearing was rated as same  Patient feels that their emotional and mental health rating is slightly worse  Patients states they are sometimes angry  Patient states they are always unusually tired/fatigued  Pain experienced in the last 7 days has been some  Patient's pain rating has been 4/10  Patient states that he has experienced weight loss or gain in last 6 months  Depression Screening:   PHQ-9 Score: 15      Fall Risk Screening: In the past year, patient has experienced: history of falling in past year    Number of falls: 1  Injured during fall?: Yes    Feels unsteady when standing or walking?: Yes    Worried about falling?: No      Home Safety:  Patient has trouble with stairs inside or outside of their home  Patient has working smoke alarms and has working carbon monoxide detector  Home safety hazards include: none  Nutrition:   Current diet is Diabetic and No Added Salt  Medications:   Patient is currently taking over-the-counter supplements  OTC medications include: see medication list  Patient is able to manage medications  Activities of Daily Living (ADLs)/Instrumental Activities of Daily Living (IADLs):   Walk and transfer into and out of bed and chair?: Yes  Dress and groom yourself?: Yes    Bathe or shower yourself?: Yes    Feed yourself?  Yes  Do your laundry/housekeeping?: Yes  Manage your money, pay your bills and track your expenses?: Yes  Make your own meals?: Yes    Do your own shopping?: Yes    Durable Medical Equipment Suppliers  does not know    Previous Hospitalizations:   Any hospitalizations or ED visits within the last 12 months?: Yes    How many hospitalizations have you had in the last year?: more than 4    Advance Care Planning:   Living will: No    Durable POA for healthcare: No    Advanced directive: No    Advanced directive counseling given: Yes    Five wishes given: Yes      Comments: Pt given a copy of Five Wishes    Cognitive Screening:   Provider or family/friend/caregiver concerned regarding cognition?: No    PREVENTIVE SCREENINGS      Cardiovascular Screening:    General: Screening Current      Diabetes Screening:     General: Screening Not Indicated and History Diabetes      Colorectal Cancer Screening:     General: Screening Current      Prostate Cancer Screening:    General: Screening Not Indicated      Osteoporosis Screening:    General: Screening Not Indicated      Abdominal Aortic Aneurysm (AAA) Screening:    Risk factors include: tobacco use        General: Screening Not Indicated      Lung Cancer Screening:     General: Screening Not Indicated      Hepatitis C Screening:    General: Screening Current    Screening, Brief Intervention, and Referral to Treatment (SBIRT)    Screening  Typical number of drinks in a day: 0  Typical number of drinks in a week: 0  Interpretation: Low risk drinking behavior  Single Item Drug Screening:  How often have you used an illegal drug (including marijuana) or a prescription medication for non-medical reasons in the past year? never    Single Item Drug Screen Score: 0  Interpretation: Negative screen for possible drug use disorder    Brief Intervention  Alcohol & drug use screenings were reviewed  No concerns regarding substance use disorder identified  Time Spent  Time spent providing alcohol/substance abuse assessment and intervention services: 0 minutes    Other Counseling Topics:   Car/seat belt/driving safety, skin self-exam, sunscreen and regular weightbearing exercise and calcium and vitamin D intake  Immunizations discussed  Consider prevnar 20    No results found  Physical Exam:     /92 (BP Location: Right arm, Patient Position: Sitting)   Pulse 75   Temp 98 2 °F (36 8 °C) (Tympanic)   Ht 5' 9 5" (1 765 m)   Wt 97 kg (213 lb 12 8 oz)   SpO2 98%   BMI 31 12 kg/m²     Physical Exam  Vitals reviewed  Constitutional:       General: He is not in acute distress  Appearance: He is obese  HENT:      Head: Normocephalic  Right Ear: Tympanic membrane and ear canal normal  There is no impacted cerumen  Left Ear: Tympanic membrane and ear canal normal  There is no impacted cerumen  Nose: Rhinorrhea present  No congestion  Mouth/Throat:      Mouth: Mucous membranes are moist       Pharynx: Oropharynx is clear  No oropharyngeal exudate or posterior oropharyngeal erythema  Eyes:      Conjunctiva/sclera: Conjunctivae normal       Pupils: Pupils are equal, round, and reactive to light  Neck:      Thyroid: No thyromegaly or thyroid tenderness  Cardiovascular:      Rate and Rhythm: Normal rate and regular rhythm  Pulses: Normal pulses  Heart sounds: Normal heart sounds  Pulmonary:      Effort: Pulmonary effort is normal  No respiratory distress  Breath sounds: Normal breath sounds  No wheezing  Abdominal:      General: Bowel sounds are normal  There is no distension  Palpations: Abdomen is soft  Tenderness: There is no abdominal tenderness  Musculoskeletal:      Cervical back: Neck supple  Right lower leg: No edema  Left lower leg: No edema  Lymphadenopathy:      Cervical: No cervical adenopathy  Skin:     General: Skin is dry  Coloration: Skin is not pale  Comments: Multiple scars on extremities   Neurological:      Mental Status: He is alert and oriented to person, place, and time     Psychiatric:         Mood and Affect: Mood normal           Arsenio Sabianist, DO

## 2023-01-18 NOTE — PATIENT INSTRUCTIONS
Medicare Preventive Visit Patient Instructions  Thank you for completing your Welcome to Medicare Visit or Medicare Annual Wellness Visit today  Your next wellness visit will be due in one year (1/19/2024)  The screening/preventive services that you may require over the next 5-10 years are detailed below  Some tests may not apply to you based off risk factors and/or age  Screening tests ordered at today's visit but not completed yet may show as past due  Also, please note that scanned in results may not display below  Preventive Screenings:  Service Recommendations Previous Testing/Comments   Colorectal Cancer Screening  · Colonoscopy    · Fecal Occult Blood Test (FOBT)/Fecal Immunochemical Test (FIT)  · Fecal DNA/Cologuard Test  · Flexible Sigmoidoscopy Age: 39-70 years old   Colonoscopy: every 10 years (May be performed more frequently if at higher risk)  OR  FOBT/FIT: every 1 year  OR  Cologuard: every 3 years  OR  Sigmoidoscopy: every 5 years  Screening may be recommended earlier than age 39 if at higher risk for colorectal cancer  Also, an individualized decision between you and your healthcare provider will decide whether screening between the ages of 74-80 would be appropriate   Colonoscopy: 06/03/2021  FOBT/FIT: Not on file  Cologuard: Not on file  Sigmoidoscopy: Not on file    Screening Current     Prostate Cancer Screening Individualized decision between patient and health care provider in men between ages of 53-78   Medicare will cover every 12 months beginning on the day after your 50th birthday PSA: No results in last 5 years     Screening Not Indicated     Hepatitis C Screening Once for adults born between 1945 and 1965  More frequently in patients at high risk for Hepatitis C Hep C Antibody: 12/23/2021    Screening Current   Diabetes Screening 1-2 times per year if you're at risk for diabetes or have pre-diabetes Fasting glucose: 191 mg/dL (12/23/2021)  A1C: 6 7 % (7/15/2022)  Screening Not Indicated  History Diabetes   Cholesterol Screening Once every 5 years if you don't have a lipid disorder  May order more often based on risk factors  Lipid panel: 02/21/2020  Screening Current      Other Preventive Screenings Covered by Medicare:  1  Abdominal Aortic Aneurysm (AAA) Screening: covered once if your at risk  You're considered to be at risk if you have a family history of AAA or a male between the age of 73-68 who smoking at least 100 cigarettes in your lifetime  2  Lung Cancer Screening: covers low dose CT scan once per year if you meet all of the following conditions: (1) Age 50-69; (2) No signs or symptoms of lung cancer; (3) Current smoker or have quit smoking within the last 15 years; (4) You have a tobacco smoking history of at least 20 pack years (packs per day x number of years you smoked); (5) You get a written order from a healthcare provider  3  Glaucoma Screening: covered annually if you're considered high risk: (1) You have diabetes OR (2) Family history of glaucoma OR (3)  aged 48 and older OR (3)  American aged 72 and older  3  Osteoporosis Screening: covered every 2 years if you meet one of the following conditions: (1) Have a vertebral abnormality; (2) On glucocorticoid therapy for more than 3 months; (3) Have primary hyperparathyroidism; (4) On osteoporosis medications and need to assess response to drug therapy  5  HIV Screening: covered annually if you're between the age of 12-76  Also covered annually if you are younger than 13 and older than 72 with risk factors for HIV infection  For pregnant patients, it is covered up to 3 times per pregnancy      Immunizations:  Immunization Recommendations   Influenza Vaccine Annual influenza vaccination during flu season is recommended for all persons aged >= 6 months who do not have contraindications   Pneumococcal Vaccine   * Pneumococcal conjugate vaccine = PCV13 (Prevnar 13), PCV15 (Vaxneuvance), PCV20 (Prevnar 20)  * Pneumococcal polysaccharide vaccine = PPSV23 (Pneumovax) Adults 2364 years old: 1-3 doses may be recommended based on certain risk factors  Adults 72 years old: 1-2 doses may be recommended based off what pneumonia vaccine you previously received   Hepatitis B Vaccine 3 dose series if at intermediate or high risk (ex: diabetes, end stage renal disease, liver disease)   Tetanus (Td) Vaccine - COST NOT COVERED BY MEDICARE PART B Following completion of primary series, a booster dose should be given every 10 years to maintain immunity against tetanus  Td may also be given as tetanus wound prophylaxis  Tdap Vaccine - COST NOT COVERED BY MEDICARE PART B Recommended at least once for all adults  For pregnant patients, recommended with each pregnancy  Shingles Vaccine (Shingrix) - COST NOT COVERED BY MEDICARE PART B  2 shot series recommended in those aged 48 and above     Health Maintenance Due:      Topic Date Due   • HIV Screening  Never done   • Colorectal Cancer Screening  06/03/2024   • Hepatitis C Screening  Completed     Immunizations Due:      Topic Date Due   • Hepatitis A Vaccine (1 of 2 - Risk 2-dose series) Never done   • Pneumococcal Vaccine: Pediatrics (0 to 5 Years) and At-Risk Patients (6 to 64 Years) (3 - PCV) 03/08/2020   • COVID-19 Vaccine (4 - Booster for Pfizer series) 12/08/2021     Advance Directives   What are advance directives? Advance directives are legal documents that state your wishes and plans for medical care  These plans are made ahead of time in case you lose your ability to make decisions for yourself  Advance directives can apply to any medical decision, such as the treatments you want, and if you want to donate organs  What are the types of advance directives? There are many types of advance directives, and each state has rules about how to use them  You may choose a combination of any of the following:  · Living will:   This is a written record of the treatment you want  You can also choose which treatments you do not want, which to limit, and which to stop at a certain time  This includes surgery, medicine, IV fluid, and tube feedings  · Durable power of  for healthcare [facility]): This is a written record that states who you want to make healthcare choices for you when you are unable to make them for yourself  This person, called a proxy, is usually a family member or a friend  You may choose more than 1 proxy  · Do not resuscitate (DNR) order:  A DNR order is used in case your heart stops beating or you stop breathing  It is a request not to have certain forms of treatment, such as CPR  A DNR order may be included in other types of advance directives  · Medical directive: This covers the care that you want if you are in a coma, near death, or unable to make decisions for yourself  You can list the treatments you want for each condition  Treatment may include pain medicine, surgery, blood transfusions, dialysis, IV or tube feedings, and a ventilator (breathing machine)  · Values history: This document has questions about your views, beliefs, and how you feel and think about life  This information can help others choose the care that you would choose  Why are advance directives important? An advance directive helps you control your care  Although spoken wishes may be used, it is better to have your wishes written down  Spoken wishes can be misunderstood, or not followed  Treatments may be given even if you do not want them  An advance directive may make it easier for your family to make difficult choices about your care  Weight Management   Why it is important to manage your weight:  Being overweight increases your risk of health conditions such as heart disease, high blood pressure, type 2 diabetes, and certain types of cancer  It can also increase your risk for osteoarthritis, sleep apnea, and other respiratory problems  Aim for a slow, steady weight loss  Even a small amount of weight loss can lower your risk of health problems  How to lose weight safely:  A safe and healthy way to lose weight is to eat fewer calories and get regular exercise  You can lose up about 1 pound a week by decreasing the number of calories you eat by 500 calories each day  Healthy meal plan for weight management:  A healthy meal plan includes a variety of foods, contains fewer calories, and helps you stay healthy  A healthy meal plan includes the following:  · Eat whole-grain foods more often  A healthy meal plan should contain fiber  Fiber is the part of grains, fruits, and vegetables that is not broken down by your body  Whole-grain foods are healthy and provide extra fiber in your diet  Some examples of whole-grain foods are whole-wheat breads and pastas, oatmeal, brown rice, and bulgur  · Eat a variety of vegetables every day  Include dark, leafy greens such as spinach, kale, shorty greens, and mustard greens  Eat yellow and orange vegetables such as carrots, sweet potatoes, and winter squash  · Eat a variety of fruits every day  Choose fresh or canned fruit (canned in its own juice or light syrup) instead of juice  Fruit juice has very little or no fiber  · Eat low-fat dairy foods  Drink fat-free (skim) milk or 1% milk  Eat fat-free yogurt and low-fat cottage cheese  Try low-fat cheeses such as mozzarella and other reduced-fat cheeses  · Choose meat and other protein foods that are low in fat  Choose beans or other legumes such as split peas or lentils  Choose fish, skinless poultry (chicken or turkey), or lean cuts of red meat (beef or pork)  Before you cook meat or poultry, cut off any visible fat  · Use less fat and oil  Try baking foods instead of frying them  Add less fat, such as margarine, sour cream, regular salad dressing and mayonnaise to foods  Eat fewer high-fat foods   Some examples of high-fat foods include french fries, doughnuts, ice cream, and cakes   · Eat fewer sweets  Limit foods and drinks that are high in sugar  This includes candy, cookies, regular soda, and sweetened drinks  Exercise:  Exercise at least 30 minutes per day on most days of the week  Some examples of exercise include walking, biking, dancing, and swimming  You can also fit in more physical activity by taking the stairs instead of the elevator or parking farther away from stores  Ask your healthcare provider about the best exercise plan for you  © Copyright [employer] 2018 Information is for End User's use only and may not be sold, redistributed or otherwise used for commercial purposes  All illustrations and images included in CareNotes® are the copyrighted property of [employer]  or [facility] Preventive Visit Patient Instructions  Thank you for completing your Welcome to Medicare Visit or Medicare Annual Wellness Visit today  Your next wellness visit will be due in one year (1/19/2024)  The screening/preventive services that you may require over the next 5-10 years are detailed below  Some tests may not apply to you based off risk factors and/or age  Screening tests ordered at today's visit but not completed yet may show as past due  Also, please note that scanned in results may not display below  Preventive Screenings:  Service Recommendations Previous Testing/Comments   Colorectal Cancer Screening  · Colonoscopy    · Fecal Occult Blood Test (FOBT)/Fecal Immunochemical Test (FIT)  · Fecal DNA/Cologuard Test  · Flexible Sigmoidoscopy Age: 39-70 years old   Colonoscopy: every 10 years (May be performed more frequently if at higher risk)  OR  FOBT/FIT: every 1 year  OR  Cologuard: every 3 years  OR  Sigmoidoscopy: every 5 years  Screening may be recommended earlier than age 39 if at higher risk for colorectal cancer   Also, an individualized decision between you and your healthcare provider will decide whether screening between the ages of 74-80 would be appropriate  Colonoscopy: 06/03/2021  FOBT/FIT: Not on file  Cologuard: Not on file  Sigmoidoscopy: Not on file    Screening Current     Prostate Cancer Screening Individualized decision between patient and health care provider in men between ages of 53-78   Medicare will cover every 12 months beginning on the day after your 50th birthday PSA: No results in last 5 years     Screening Not Indicated     Hepatitis C Screening Once for adults born between 1945 and 1965  More frequently in patients at high risk for Hepatitis C Hep C Antibody: 12/23/2021    Screening Current   Diabetes Screening 1-2 times per year if you're at risk for diabetes or have pre-diabetes Fasting glucose: 191 mg/dL (12/23/2021)  A1C: 6 7 % (7/15/2022)  Screening Not Indicated  History Diabetes   Cholesterol Screening Once every 5 years if you don't have a lipid disorder  May order more often based on risk factors  Lipid panel: 02/21/2020  Screening Current      Other Preventive Screenings Covered by Medicare:  6  Abdominal Aortic Aneurysm (AAA) Screening: covered once if your at risk  You're considered to be at risk if you have a family history of AAA or a male between the age of 73-68 who smoking at least 100 cigarettes in your lifetime  7  Lung Cancer Screening: covers low dose CT scan once per year if you meet all of the following conditions: (1) Age 50-69; (2) No signs or symptoms of lung cancer; (3) Current smoker or have quit smoking within the last 15 years; (4) You have a tobacco smoking history of at least 20 pack years (packs per day x number of years you smoked); (5) You get a written order from a healthcare provider  8  Glaucoma Screening: covered annually if you're considered high risk: (1) You have diabetes OR (2) Family history of glaucoma OR (3)  aged 48 and older OR (3)  American aged 72 and older  5   Osteoporosis Screening: covered every 2 years if you meet one of the following conditions: (1) Have a vertebral abnormality; (2) On glucocorticoid therapy for more than 3 months; (3) Have primary hyperparathyroidism; (4) On osteoporosis medications and need to assess response to drug therapy  10  HIV Screening: covered annually if you're between the age of 12-76  Also covered annually if you are younger than 13 and older than 72 with risk factors for HIV infection  For pregnant patients, it is covered up to 3 times per pregnancy  Immunizations:  Immunization Recommendations   Influenza Vaccine Annual influenza vaccination during flu season is recommended for all persons aged >= 6 months who do not have contraindications   Pneumococcal Vaccine   * Pneumococcal conjugate vaccine = PCV13 (Prevnar 13), PCV15 (Vaxneuvance), PCV20 (Prevnar 20)  * Pneumococcal polysaccharide vaccine = PPSV23 (Pneumovax) Adults 25-60 years old: 1-3 doses may be recommended based on certain risk factors  Adults 72 years old: 1-2 doses may be recommended based off what pneumonia vaccine you previously received   Hepatitis B Vaccine 3 dose series if at intermediate or high risk (ex: diabetes, end stage renal disease, liver disease)   Tetanus (Td) Vaccine - COST NOT COVERED BY MEDICARE PART B Following completion of primary series, a booster dose should be given every 10 years to maintain immunity against tetanus  Td may also be given as tetanus wound prophylaxis  Tdap Vaccine - COST NOT COVERED BY MEDICARE PART B Recommended at least once for all adults  For pregnant patients, recommended with each pregnancy     Shingles Vaccine (Shingrix) - COST NOT COVERED BY MEDICARE PART B  2 shot series recommended in those aged 48 and above     Health Maintenance Due:      Topic Date Due   • HIV Screening  Never done   • Colorectal Cancer Screening  06/03/2024   • Hepatitis C Screening  Completed     Immunizations Due:      Topic Date Due   • Hepatitis A Vaccine (1 of 2 - Risk 2-dose series) Never done   • Pneumococcal Vaccine: Pediatrics (0 to 5 Years) and At-Risk Patients (6 to 59 Years) (3 - PCV) 03/08/2020   • COVID-19 Vaccine (5 - Booster for Pfizer series) 07/21/2022     Advance Directives   What are advance directives? Advance directives are legal documents that state your wishes and plans for medical care  These plans are made ahead of time in case you lose your ability to make decisions for yourself  Advance directives can apply to any medical decision, such as the treatments you want, and if you want to donate organs  What are the types of advance directives? There are many types of advance directives, and each state has rules about how to use them  You may choose a combination of any of the following:  · Living will: This is a written record of the treatment you want  You can also choose which treatments you do not want, which to limit, and which to stop at a certain time  This includes surgery, medicine, IV fluid, and tube feedings  · Durable power of  for healthcare [facility]): This is a written record that states who you want to make healthcare choices for you when you are unable to make them for yourself  This person, called a proxy, is usually a family member or a friend  You may choose more than 1 proxy  · Do not resuscitate (DNR) order:  A DNR order is used in case your heart stops beating or you stop breathing  It is a request not to have certain forms of treatment, such as CPR  A DNR order may be included in other types of advance directives  · Medical directive: This covers the care that you want if you are in a coma, near death, or unable to make decisions for yourself  You can list the treatments you want for each condition  Treatment may include pain medicine, surgery, blood transfusions, dialysis, IV or tube feedings, and a ventilator (breathing machine)  · Values history: This document has questions about your views, beliefs, and how you feel and think about life   This information can help others choose the care that you would choose  Why are advance directives important? An advance directive helps you control your care  Although spoken wishes may be used, it is better to have your wishes written down  Spoken wishes can be misunderstood, or not followed  Treatments may be given even if you do not want them  An advance directive may make it easier for your family to make difficult choices about your care  Weight Management   Why it is important to manage your weight:  Being overweight increases your risk of health conditions such as heart disease, high blood pressure, type 2 diabetes, and certain types of cancer  It can also increase your risk for osteoarthritis, sleep apnea, and other respiratory problems  Aim for a slow, steady weight loss  Even a small amount of weight loss can lower your risk of health problems  How to lose weight safely:  A safe and healthy way to lose weight is to eat fewer calories and get regular exercise  You can lose up about 1 pound a week by decreasing the number of calories you eat by 500 calories each day  Healthy meal plan for weight management:  A healthy meal plan includes a variety of foods, contains fewer calories, and helps you stay healthy  A healthy meal plan includes the following:  · Eat whole-grain foods more often  A healthy meal plan should contain fiber  Fiber is the part of grains, fruits, and vegetables that is not broken down by your body  Whole-grain foods are healthy and provide extra fiber in your diet  Some examples of whole-grain foods are whole-wheat breads and pastas, oatmeal, brown rice, and bulgur  · Eat a variety of vegetables every day  Include dark, leafy greens such as spinach, kale, shorty greens, and mustard greens  Eat yellow and orange vegetables such as carrots, sweet potatoes, and winter squash  · Eat a variety of fruits every day  Choose fresh or canned fruit (canned in its own juice or light syrup) instead of juice   Fruit juice has very little or no fiber  · Eat low-fat dairy foods  Drink fat-free (skim) milk or 1% milk  Eat fat-free yogurt and low-fat cottage cheese  Try low-fat cheeses such as mozzarella and other reduced-fat cheeses  · Choose meat and other protein foods that are low in fat  Choose beans or other legumes such as split peas or lentils  Choose fish, skinless poultry (chicken or turkey), or lean cuts of red meat (beef or pork)  Before you cook meat or poultry, cut off any visible fat  · Use less fat and oil  Try baking foods instead of frying them  Add less fat, such as margarine, sour cream, regular salad dressing and mayonnaise to foods  Eat fewer high-fat foods  Some examples of high-fat foods include french fries, doughnuts, ice cream, and cakes  · Eat fewer sweets  Limit foods and drinks that are high in sugar  This includes candy, cookies, regular soda, and sweetened drinks  Exercise:  Exercise at least 30 minutes per day on most days of the week  Some examples of exercise include walking, biking, dancing, and swimming  You can also fit in more physical activity by taking the stairs instead of the elevator or parking farther away from stores  Ask your healthcare provider about the best exercise plan for you  © Copyright [employer] 2018 Information is for End User's use only and may not be sold, redistributed or otherwise used for commercial purposes   All illustrations and images included in CareNotes® are the copyrighted property of [employer]  or [address] [place] [address]

## 2023-01-20 NOTE — ED PROVIDER NOTES
History  Chief Complaint   Patient presents with   • Hypertension     Had dialysis yesterday; doesn't think they took off enough fluid, htn     42year old male presenting with high blood pressure  Patient states that he doesn't think they took off enough fluid during dialysis  Patient has a history of ESRD secondary to uncontrolled DMII and HTN on 5 medications at this time  Patient states that occasionally he will get a headache and will know that his blood pressure is high  No headaches or complaints of any symptoms at this time  Prior to Admission Medications   Prescriptions Last Dose Informant Patient Reported? Taking?    B-D ULTRAFINE III SHORT PEN 31G X 8 MM MISC   No No   Sig: USE 1 PEN NEEDLE 8 TIMES DAILY   GLUCAGON EMERGENCY 1 MG injection   Yes No   Gvoke HypoPen 1-Pack 1 MG/0 2ML SOAJ   Yes No   NIFEdipine ER (ADALAT CC) 60 MG 24 hr tablet   No No   Sig: Take 1 tablet (60 mg total) by mouth 2 (two) times a day   NovoLOG 100 UNIT/ML injection   Yes No   Sig: INJECT UP TO 50 UNITS DAILY IN INSULIN PUMP   NovoLOG FlexPen ReliOn 100 UNIT/ML injection pen   Yes No   Patiromer Sorbitex Calcium (VELTASSA PO)   Yes No   Sig: Take 5 g by mouth once a week   SPS 15 GM/60ML suspension   Yes No   Sig: TAKE 60 ML BY MOUTH SINGLE DOSE FOR EMERGENCY USE DURING MISSED HEMODIALYSIS   amoxicillin (AMOXIL) 500 mg capsule   No No   Sig: Take 1 capsule (500 mg total) by mouth every 24 hours for 7 days On dialysis days take after dialysis   aspirin (ECOTRIN LOW STRENGTH) 81 mg EC tablet   Yes No   Sig: Take 81 mg by mouth daily   atorvastatin (LIPITOR) 40 mg tablet   No No   Sig: Take 1 tablet (40 mg total) by mouth every evening   b complex vitamins capsule   Yes No   Sig: Take 1 capsule by mouth daily before lunch    calcium acetate (PHOSLO) capsule   Yes No   Sig: TAKE 3 CAPSULES BY MOUTH WITH MEALS   cholecalciferol (VITAMIN D3) 1,000 units tablet   Yes No   Sig: Take 2,000 Units by mouth daily     cinacalcet (SENSIPAR) 60 MG tablet   Yes No   Sig: Take 120 mg by mouth daily 2 tab daily    cloNIDine (CATAPRES-TTS-3) 0 3 mg/24 hr   Yes No   Si patch once a week    clotrimazole (LOTRIMIN) 1 % cream   No No   Sig: Apply to affected area 2 times daily for 2 weeks   doxazosin (CARDURA) 2 mg tablet   No No   Sig: TAKE 2 TABLETS BY MOUTH IN THE MORNING AND 2 IN THE EVENING   escitalopram (LEXAPRO) 20 mg tablet   No No   Sig: Take 1 tablet (20 mg total) by mouth daily   famotidine (PEPCID) 40 MG tablet   No No   Sig: Take 1 tablet (40 mg total) by mouth daily   hydrOXYzine HCL (ATARAX) 10 mg tablet   No No   Sig: Take 1 tablet (10 mg total) by mouth every 6 (six) hours as needed for itching or anxiety   insulin glargine (Basaglar KwikPen) 100 units/mL injection pen   No No   Sig: Inject 7 Units under the skin daily at bedtime   labetalol (NORMODYNE) 200 mg tablet   No No   Sig: TAKE 2 TABLETS BY MOUTH THREE TIMES DAILY   levothyroxine 25 mcg tablet   No No   Sig: Take 1 tablet (25 mcg total) by mouth daily   lisinopril (ZESTRIL) 40 mg tablet   Yes No   Sig: Take 80 mg by mouth daily   metolazone (ZAROXOLYN) 10 mg tablet   Yes No   Sig: Take 5 mg by mouth daily   minoxidil (LONITEN) 2 5 mg tablet   Yes No   Sig: Take 2 5 mg by mouth 2 (two) times a day   mupirocin (BACTROBAN) 2 % ointment   No No   Sig: Apply topically 3 (three) times a day Until lesion on right hand heals     naproxen (Naprosyn) 500 mg tablet   No No   Sig: Take 1 tablet (500 mg total) by mouth 2 (two) times a day with meals for 7 days   ofloxacin (FLOXIN) 0 3 % otic solution   No No   Sig: Administer 10 drops to the right ear daily   torsemide (DEMADEX) 100 mg tablet   Yes No   Sig: Take 100 mg by mouth daily     traZODone (DESYREL) 50 mg tablet   No No   Sig: Take 0 5 tablets (25 mg total) by mouth daily at bedtime as needed for sleep   triamcinolone (KENALOG) 0 1 % ointment   No No   Sig: Apply topically 2 (two) times a day For up to 14 days on the itchy areas  Avoid groin, underarms and face  It is important to take at least 1 week break between uses  Facility-Administered Medications: None       Past Medical History:   Diagnosis Date   • Acute kidney injury ([facility] [address] )    • Ambulates with cane    • Anuria    • Anxiety    • Cellulitis of right elbow 03/31/2021   • Chronic kidney disease    • Depression    • Diabetes mellitus ([facility] [address] )    • Diarrhea    • Emesis 10/24/2020   • End stage renal disease ([facility] [address] ) 02/11/2018    Formatting of this note might be different from the original  Last Assessment & Plan:  Secondary to DM  On nightly PD  Followed by [facility]    Patient considering transplant for kidney and pancreas through [id number] of this note might be different from the original  Last Assessment & Plan:  Formatting of this note might be different from the original  Lab Results  Component Value Date   EGFR    • Falls    • Gastroparesis    • GERD (gastroesophageal reflux disease)    • History of shingles 2010   • History of transfusion 02/2018    no adverse reaction   • Hyperlipidemia    • Hyperphosphatemia    • Hypertension    • Hypoglycemia 7/15/2022   • Itching    • Mastoiditis of right side 07/15/2022   • Muscle weakness     general unsteadiness   • Obesity (BMI 30 0-34 9) 09/09/2019   • Orthostatic hypotension 10/25/2020   • Peripheral polyneuropathy 11/20/2019   • PONV (postoperative nausea and vomiting) 01/26/2018   • Protein-calorie malnutrition ([facility] [address] ) 11/23/2020   • Recurrent peritonitis ([facility] ) due to peritoneal dialysis catheter 07/31/2020   • Retinopathy    • Seizures ([facility] )     early 2020 - one time   • Skin abnormality     some dime size areas where skin was scratched from itching   • Spontaneous bacterial peritonitis ([facility] [address] ) 10/19/2020   • Squamous cell skin cancer     left temple   • Stroke ([facility] [address] )     x2 - off balance/no driving/fatigue   • Swelling of both lower extremities    • Vomiting    • Wears glasses        Past Surgical History:   Procedure Laterality Date   • CARDIAC LOOP RECORDER  05/2018   • COLONOSCOPY     • EGD     • EYE SURGERY Right    • IR AV FISTULAGRAM/GRAFTOGRAM  02/23/2021   • IR TUNNELED CENTRAL LINE PLACEMENT  02/16/2021   • IR TUNNELED DIALYSIS CATHETER PLACEMENT  11/18/2020   • IR TUNNELED DIALYSIS CATHETER REMOVAL  02/12/2021   • IR TUNNELED DIALYSIS CATHETER REMOVAL  03/11/2021   • MOHS SURGERY Left 12/14/2022    Left temple with Dr Mitch Moore   • [address] N/A 08/27/2018    Procedure: UNROOF PD CATHETER;  Surgeon: Caryle Marshal, DO;  Location: [facility];  Service: General   • IA ARTERIOVENOUS ANASTOMOSIS OPEN DIRECT Left 11/09/2020    Procedure: CREATION FISTULA  ARTERIOVENOUS (AV) - LEFT WRIST;  Surgeon: Dimitri Stephen MD;  Location: [facility];  Service: Vascular   • IA ESOPHAGOGASTRODUODENOSCOPY TRANSORAL DIAGNOSTIC N/A 04/18/2019    Procedure: ESOPHAGOGASTRODUODENOSCOPY (EGD); Surgeon: Quentin Paniagua MD;  Location: [facility]; Service: Gastroenterology   • IA LAPS INSERTION TUNNELED INTRAPERITONEAL CATHETER N/A 08/06/2018    Procedure: LAPAROSCOPIC PD CATHETER PLACEMENT;  Surgeon: Caryle Marshal, DO;  Location: [facility];  Service: General   • IA REMOVAL TUNNELED INTRAPERITONEAL CATHETER N/A 11/18/2020    Procedure: REMOVAL CATHETER PERITONEAL DIALYSIS;  Surgeon: David Springer MD;  [facility]: [facility];  Service: General   • TONSILLECTOMY     • UPPER GASTROINTESTINAL ENDOSCOPY         Family History   Problem Relation Age of Onset   • Breast cancer Mother    • Hypertension Mother    • Hyperlipidemia Father    • Hypertension Father    • Leukemia Maternal Grandmother    • Hyperlipidemia Maternal Grandfather    • Hypertension Maternal Grandfather    • Hyperlipidemia Paternal Grandmother    • Hypertension Paternal Grandmother    • Heart disease Paternal Grandfather         cardiac disorder   • Diabetes Paternal Grandfather      I have reviewed and agree with the history as documented      E-Cigarette/Vaping   • E-Cigarette Use Current Every Day User    • Comments medical marijuana      E-Cigarette/Vaping Substances   • Nicotine No    • THC Yes    • CBD Yes    • Flavoring No    • Other No    • Unknown No      Social History     Tobacco Use   • Smoking status: Former     Packs/day: 0 50     Years: 12 00     Pack years: 6 00     Types: Cigarettes     Quit date: 2018     Years since quittin 9   • Smokeless tobacco: Never   • Tobacco comments:     quit 2018   Vaping Use   • Vaping Use: Every day   • Substances: THC, CBD   Substance Use Topics   • Alcohol use: Not Currently     Comment: occassionally   • Drug use: Yes     Types: Marijuana     Comment: medical marijuana       Review of Systems   Constitutional: Negative for chills and fever  HENT: Negative for ear pain and sore throat  Eyes: Negative for pain and visual disturbance  Respiratory: Negative for cough and shortness of breath  Cardiovascular: Negative for chest pain and palpitations  Gastrointestinal: Negative for abdominal pain, constipation, diarrhea, nausea and vomiting  Genitourinary: Negative for dysuria and hematuria  Musculoskeletal: Negative for arthralgias and back pain  Skin: Negative for color change and rash  Neurological: Negative for dizziness, seizures, syncope, weakness and headaches (Occassional, last this AM, none now)  All other systems reviewed and are negative  Physical Exam  Physical Exam  Vitals and nursing note reviewed  Constitutional:       General: He is not in acute distress  Appearance: He is well-developed  He is not ill-appearing  HENT:      Head: Normocephalic and atraumatic  Eyes:      Conjunctiva/sclera: Conjunctivae normal    Cardiovascular:      Rate and Rhythm: Normal rate and regular rhythm  Pulses: Normal pulses  Heart sounds: Normal heart sounds  No murmur heard  No friction rub  No gallop  Pulmonary:      Effort: Pulmonary effort is normal  No respiratory distress  Breath sounds: Normal breath sounds  No stridor  No wheezing, rhonchi or rales  Chest:      Chest wall: No tenderness  Abdominal:      Palpations: Abdomen is soft  Tenderness: There is no abdominal tenderness  There is no right CVA tenderness or left CVA tenderness  Musculoskeletal:         General: No swelling  Cervical back: Neck supple  Right lower leg: No edema  Left lower leg: No edema  Skin:     General: Skin is warm and dry  Capillary Refill: Capillary refill takes less than 2 seconds  Coloration: Skin is not jaundiced or pale  Findings: No bruising, erythema, lesion or rash  Neurological:      Mental Status: He is alert     Psychiatric:         Mood and Affect: Mood normal          Vital Signs  ED Triage Vitals   Temperature Pulse Respirations Blood Pressure SpO2   01/15/23 1436 01/15/23 1435 01/15/23 1435 01/15/23 1435 01/15/23 1435   98 6 °F (37 °C) 81 18 (!) 235/105 100 %      Temp src Heart Rate Source Patient Position - Orthostatic VS BP Location FiO2 (%)   -- 01/15/23 1435 01/15/23 1435 01/15/23 1435 --    Monitor Sitting Right arm       Pain Score       --                  Vitals:    01/15/23 1600 01/15/23 1627 01/15/23 1652 01/15/23 1700   BP: (!) 226/105 (!) 237/98 (!) 220/87 (!) 197/72   Pulse: 68   75   Patient Position - Orthostatic VS:             Visual Acuity      ED Medications  Medications   labetalol (NORMODYNE) injection 10 mg (10 mg Intravenous Given 1/15/23 1506)   hydrALAZINE (APRESOLINE) injection 10 mg (10 mg Intravenous Given 1/15/23 1627)       Diagnostic Studies  Results Reviewed     Procedure Component Value Units Date/Time    HS Troponin 0hr (reflex protocol) [[id number]]  (Normal) Collected: 01/15/23 1505    Lab Status: Final result Specimen: Blood from Arm, Right Updated: 01/15/23 1620     hs TnI 0hr 17 ng/L     Comprehensive metabolic panel [[id number]]  (Abnormal) Collected: 01/15/23 1505    Lab Status: Final result Specimen: Blood from Arm, Right Updated: 01/15/23 1534     Sodium 139 mmol/L      Potassium 4 4 mmol/L      Chloride 97 mmol/L      CO2 32 mmol/L      ANION GAP 10 mmol/L      BUN 23 mg/dL      Creatinine 7 48 mg/dL      Glucose 218 mg/dL      Calcium 8 1 mg/dL      AST 16 U/L      ALT 11 U/L      Alkaline Phosphatase 89 U/L      Total Protein 6 6 g/dL      Albumin 3 9 g/dL      Total Bilirubin 0 64 mg/dL      eGFR 8 ml/min/1 73sq m     Narrative:      National Kidney Disease Foundation guidelines for Chronic Kidney Disease (CKD):   •  Stage 1 with normal or high GFR (GFR > 90 mL/min/1 73 square meters)  •  Stage 2 Mild CKD (GFR = 60-89 mL/min/1 73 square meters)  •  Stage 3A Moderate CKD (GFR = 45-59 mL/min/1 73 square meters)  •  Stage 3B Moderate CKD (GFR = 30-44 mL/min/1 73 square meters)  •  Stage 4 Severe CKD (GFR = 15-29 mL/min/1 73 square meters)  •  Stage 5 End Stage CKD (GFR <15 mL/min/1 73 square meters)  Note: GFR calculation is accurate only with a steady state creatinine    CBC and differential [[id number]]  (Abnormal) Collected: 01/15/23 1505    Lab Status: Final result Specimen: Blood from Arm, Right Updated: 01/15/23 1516     WBC 6 35 Thousand/uL      RBC 2 91 Million/uL      Hemoglobin 9 4 g/dL      Hematocrit 28 5 %      MCV 98 fL      MCH 32 3 pg      MCHC 33 0 g/dL      RDW 13 2 %      MPV 9 3 fL      Platelets 904 Thousands/uL      nRBC 0 /100 WBCs      Neutrophils Relative 62 %      Immat GRANS % 1 %      Lymphocytes Relative 23 %      Monocytes Relative 6 %      Eosinophils Relative 7 %      Basophils Relative 1 %      Neutrophils Absolute 3 95 Thousands/µL      Immature Grans Absolute 0 03 Thousand/uL      Lymphocytes Absolute 1 48 Thousands/µL      Monocytes Absolute 0 40 Thousand/µL      Eosinophils Absolute 0 44 Thousand/µL      Basophils Absolute 0 05 Thousands/µL                  No orders to display              Procedures  Procedures         ED Course  ED Course as of 01/20/23 1058   Sun Eliezer 15, 2023 1712 hs TnI 0hr: 17  Baseline, decreased from previous which was 27  No chest pain or shortness of breath   1712 Blood Pressure(!): 197/72  Decreased after Hydralazine 10 IV     1712 Spoke to nephrology at [facility] -- was informed to increase nifidipine to 90 mg PO and add hydralazine 25 mg PO TID  SBIRT 22yo+    Flowsheet Row Most Recent Value   SBIRT (23 yo +)    In order to provide better care to our patients, we are screening all of our patients for alcohol and drug use  Would it be okay to ask you these screening questions? Unable to answer at this time Filed at: 01/15/2023 0115                    Medical Decision Making  22-year-old male presenting with high blood pressure  Patient states that he has a headache occasionally but does not now  Patient states that he has dialysis 3 times a week and had it earlier today but states that he does not think they took off no fluid  Will check for any organ damage with CBC, CMP and troponin  Upon was baseline for patient as well as CBC and CMP  Spoke to nephrology here at [facility] and patient's nephrologist over at [facility]  Discussed that the patient is on multiple medications for hypertension and refractory to labetalol that was given in the ER  Was informed that we should try hydralazine here in the ER and send him home with hydralazine 3 times a day until he is able to see his nephrologist or primary care  Hydralazine 10 IV was given here in the ER and seemed to help his blood pressure by about 20 points  Patient still denying any symptoms at this time  Discussed with nephrologist the increase of his nifedipine to 90 mg from 60 once a day and addition of hydralazine  Patient was receptive to this advice and prescriptions were sent to the pharmacy  He needed to leave because he needs take his other medications at home which included more blood pressure medications    Patient was in no acute distress and was well-appearing on exam   Labs were within normal limits for patient  No chest pain at this time, troponin was baseline  Electrolytes were normal     Will have patient follow-up with his nephrologist and attend dialysis in 2 days  ESRD (end stage renal disease) ([facility] ): complicated acute illness or injury  Hypertensive urgency: complicated acute illness or injury  Uncontrolled hypertension: complicated acute illness or injury  Amount and/or Complexity of Data Reviewed  Labs: ordered  Decision-making details documented in ED Course  Risk  Prescription drug management  Disposition  Final diagnoses:   Uncontrolled hypertension   Hypertensive urgency   ESRD (end stage renal disease) ([facility] )     Time reflects when diagnosis was documented in both MDM as applicable and the Disposition within this note     Time User Action Codes Description Comment    1/15/2023  4:27 PM Rodrigo Domingo Uncontrolled hypertension     1/15/2023  4:27 PM Christine Ruddle Add [I16 0] Hypertensive urgency     1/15/2023  4:27 PM Christine Ruddle Add [N18 6] ESRD (end stage renal disease) ([facility] )     1/15/2023  4:45 PM Christine Ruddle Modify [I10] Uncontrolled hypertension     1/15/2023  4:45 PM Christine Ruddle Modify [I16 0] Hypertensive urgency     1/15/2023  4:45 PM Christine Ruddle Modify [N18 6] ESRD (end stage renal disease) [facility])       ED Disposition     ED Disposition   Discharge    Condition   Stable    Date/Time   Sun Eliezer 15, 2023  4:27 PM    Comment   Lyle Mckeon discharge to home/self care                 Follow-up Information     Follow up With Specialties Details Why Contact Info Additional [address] Emergency Department Emergency Medicine Go to  If symptoms worsen [address] Emergency Department, [address], [place], [place], [address]          Discharge Medication List as of 1/15/2023  4:57 PM      START taking these medications    Details   hydrALAZINE (APRESOLINE) 25 mg tablet Take 1 tablet (25 mg total) by mouth 3 (three) times a day, Starting Sun 1/15/2023, Normal      NIFEdipine (PROCARDIA XL) 90 mg 24 hr tablet Take 1 tablet (90 mg total) by mouth daily, Starting Sun 1/15/2023, Normal         CONTINUE these medications which have NOT CHANGED    Details   amoxicillin (AMOXIL) 500 mg capsule Take 1 capsule (500 mg total) by mouth every 24 hours for 7 days On dialysis days take after dialysis, Starting Fri 1/13/2023, Until Fri 1/20/2023, Normal      aspirin (ECOTRIN LOW STRENGTH) 81 mg EC tablet Take 81 mg by mouth daily, Historical Med      atorvastatin (LIPITOR) 40 mg tablet Take 1 tablet (40 mg total) by mouth every evening, Starting Tue 9/20/2022, Normal      b complex vitamins capsule Take 1 capsule by mouth daily before lunch , Historical Med      B-D ULTRAFINE III SHORT PEN 31G X 8 MM MISC USE 1 PEN NEEDLE 8 TIMES DAILY, Normal      calcium acetate (PHOSLO) capsule TAKE 3 CAPSULES BY MOUTH WITH MEALS, Historical Med      cholecalciferol (VITAMIN D3) 1,000 units tablet Take 2,000 Units by mouth daily  , Historical Med      cinacalcet (SENSIPAR) 60 MG tablet Take 120 mg by mouth daily 2 tab daily , Historical Med      cloNIDine (CATAPRES-TTS-3) 0 3 mg/24 hr 1 patch once a week , Historical Med      clotrimazole (LOTRIMIN) 1 % cream Apply to affected area 2 times daily for 2 weeks, Normal      doxazosin (CARDURA) 2 mg tablet TAKE 2 TABLETS BY MOUTH IN THE MORNING AND 2 IN THE EVENING, Normal      escitalopram (LEXAPRO) 20 mg tablet Take 1 tablet (20 mg total) by mouth daily, Starting Tue 7/12/2022, Normal      famotidine (PEPCID) 40 MG tablet Take 1 tablet (40 mg total) by mouth daily, Starting Mon 11/21/2022, Normal      GLUCAGON EMERGENCY 1 MG injection Historical Med      Gvoke HypoPen 1-Pack 1 MG/0 2ML SOAJ Historical Med      hydrOXYzine HCL (ATARAX) 10 mg tablet Take 1 tablet (10 mg total) by mouth every 6 (six) hours as needed for itching or anxiety, Starting Wed 12/7/2022, Normal      insulin glargine (Basaglar KwikPen) 100 units/mL injection pen Inject 7 Units under the skin daily at bedtime, Starting Sat 7/16/2022, Normal      labetalol (NORMODYNE) 200 mg tablet TAKE 2 TABLETS BY MOUTH THREE TIMES DAILY, Normal      levothyroxine 25 mcg tablet Take 1 tablet (25 mcg total) by mouth daily, Starting Tue 2/8/2022, Normal      lisinopril (ZESTRIL) 40 mg tablet Take 80 mg by mouth daily, Starting Wed 2/9/2022, Historical Med      metolazone (ZAROXOLYN) 10 mg tablet Take 5 mg by mouth daily, Historical Med      minoxidil (LONITEN) 2 5 mg tablet Take 2 5 mg by mouth 2 (two) times a day, Starting Tue 2/9/2021, Historical Med      mupirocin (BACTROBAN) 2 % ointment Apply topically 3 (three) times a day Until lesion on right hand heals  , Starting Fri 10/14/2022, Normal      naproxen (Naprosyn) 500 mg tablet Take 1 tablet (500 mg total) by mouth 2 (two) times a day with meals for 7 days, Starting Wed 11/30/2022, Until Wed 12/7/2022, Normal      NIFEdipine ER (ADALAT CC) 60 MG 24 hr tablet Take 1 tablet (60 mg total) by mouth 2 (two) times a day, Starting Tue 3/3/2020, No Print      NovoLOG 100 UNIT/ML injection INJECT UP TO 50 UNITS DAILY IN INSULIN PUMP, Historical Med      NovoLOG FlexPen ReliOn 100 UNIT/ML injection pen Starting Tue 8/16/2022, Historical Med      ofloxacin (FLOXIN) 0 3 % otic solution Administer 10 drops to the right ear daily, Starting Tue 5/31/2022, Normal      Patiromer Sorbitex Calcium (VELTASSA PO) Take 5 g by mouth once a week, Historical Med      SPS 15 GM/60ML suspension TAKE 60 ML BY MOUTH SINGLE DOSE FOR EMERGENCY USE DURING MISSED HEMODIALYSIS, Historical Med      torsemide (DEMADEX) 100 mg tablet Take 100 mg by mouth daily  , Historical Med      traZODone (DESYREL) 50 mg tablet Take 0 5 tablets (25 mg total) by mouth daily at bedtime as needed for sleep, Starting Wed 12/7/2022, Normal      triamcinolone (KENALOG) 0 1 % ointment Apply topically 2 (two) times a day For up to 14 days on the itchy areas  Avoid groin, underarms and face  It is important to take at least 1 week break between uses  , Starting Fri 10/14/2022, Normal      gabapentin (NEURONTIN) 100 mg capsule Take 2 tablets at bedtime, Normal      ondansetron (ZOFRAN) 4 mg tablet TAKE 1 TABLET BY MOUTH EVERY 8 HOURS AS NEEDED FOR NAUSEA FOR VOMITING, Normal             No discharge procedures on file      PDMP Review       Value Time User    PDMP Reviewed  Yes 7/15/2022  4:20 AM Gilbert Nettles MD          ED Provider  Electronically Signed by           Fredy Greenberg PA-C  01/20/23 9186

## 2023-01-21 NOTE — ASSESSMENT & PLAN NOTE
· Present upon admission, baseline 1 9-2 2 likely secondary to uncontrolled type 1 diabetes mellitus  · Suspect ARF due to contrast induced nephropathy  · Due to decreased serum bicarb, will start a 1/2 NS bicarb infusion  · Significant proteinuria noted on UA  · Oliguric, monitor serial bladder scans  · Renal ultrasound is unremarkbale  · Change to low phos, low K diet  · Nephrology following no

## 2023-01-27 DIAGNOSIS — I16.0 HYPERTENSIVE URGENCY: ICD-10-CM

## 2023-01-27 DIAGNOSIS — I10 UNCONTROLLED HYPERTENSION: ICD-10-CM

## 2023-01-27 RX ORDER — HYDRALAZINE HYDROCHLORIDE 25 MG/1
25 TABLET, FILM COATED ORAL 3 TIMES DAILY
Qty: 270 TABLET | Refills: 0 | Status: SHIPPED | OUTPATIENT
Start: 2023-01-27

## 2023-02-17 ENCOUNTER — TELEPHONE (OUTPATIENT)
Dept: INTERNAL MEDICINE CLINIC | Facility: CLINIC | Age: 38
End: 2023-02-17

## 2023-02-17 NOTE — LETTER
Date: 2/21/2023      Re: Zita Dennis         Juror # [id number]    To whom it may concern: This is to certify that Zita Dennis has been under my care for the following diagnoses: Diabetes type 1 with neuropathy, End stage renal disease on hemodialysis, sleep apnea, renovascular hypertension, nonobstructive coronary artery disease  I feel that Zita Dennis is unable to serve on [facility] Duty at this time for the above mentioned medical reasons  Patient requires access to dialysis three days per week      Sincerely,        Arya Long, [address] [facility]

## 2023-02-17 NOTE — TELEPHONE ENCOUNTER
The reason I'm calling on his behalf is he was asked to be on jury duty and he had called the jury  and they said if a doctor could fax them something that says he can't be on jury duty  And I was hoping Doctor Chino Braga could do that  The fax number is [phone]  This is the jury  at [facility]  If you have any questions, also bills jury number is [id number]  If you have any questions, please feel free to call me  Also, if there is a fee for this service, please call me  I'll be happy to stop at the office and pay the fee  Again, my name is Alvina Pringle and my phone number is area code [phone]  Thank you very much   Have a good week

## 2023-03-06 ENCOUNTER — OFFICE VISIT (OUTPATIENT)
Dept: PODIATRY | Facility: CLINIC | Age: 38
End: 2023-03-06

## 2023-03-06 VITALS — HEIGHT: 70 IN | BODY MASS INDEX: 31.07 KG/M2 | WEIGHT: 217 LBS

## 2023-03-06 DIAGNOSIS — E10.42 DIABETIC POLYNEUROPATHY ASSOCIATED WITH TYPE 1 DIABETES MELLITUS (HCC): Primary | ICD-10-CM

## 2023-03-06 RX ORDER — SACCHAROMYCES BOULARDII 250 MG
250 CAPSULE ORAL DAILY
COMMUNITY

## 2023-03-06 NOTE — PROGRESS NOTES
Established patient with class findings presents for nail care  Vascular exam:  DP  0/4 bilateral; PT  0 4 bilateral   Dermatological exam:  Each toenail is thick and  dystrophic  Diagnosis:  Diabetes mellitus  Treatment: Trimmed multiple dystrophic toenails      Nail removal    Date/Time: 3/6/2023 11:43 AM  Performed by: Jose Yusuf DPM  Authorized by: Jose Yusuf DPM     Nails trimmed:     Number of nails trimmed:  10

## 2023-03-07 ENCOUNTER — TELEPHONE (OUTPATIENT)
Dept: SLEEP CENTER | Facility: CLINIC | Age: 38
End: 2023-03-07

## 2023-03-07 NOTE — TELEPHONE ENCOUNTER
Patient's mom Breanna Espinal is calling to ask if the split study can be changed to a home study as it is more convenient for the patient

## 2023-03-13 NOTE — TELEPHONE ENCOUNTER
Call placed to patient's mother  Advised split-night sleep studies must occur in a sleep lab  Explained that the first [id number] of the study will ascertain the severity of the RACHEAL, while the second portion of the study the patient will start with PAP and the pressures adjusted through the study to determine which settings best treat the RACHEAL  Daysi verbalized understaing and was appreciative of the telephone call

## 2023-03-15 ENCOUNTER — OFFICE VISIT (OUTPATIENT)
Dept: PSYCHIATRY | Facility: CLINIC | Age: 38
End: 2023-03-15

## 2023-03-15 DIAGNOSIS — F33.0 MILD EPISODE OF RECURRENT MAJOR DEPRESSIVE DISORDER (HCC): ICD-10-CM

## 2023-03-15 DIAGNOSIS — F41.1 GENERALIZED ANXIETY DISORDER: Primary | ICD-10-CM

## 2023-03-15 DIAGNOSIS — F33.1 MODERATE EPISODE OF RECURRENT MAJOR DEPRESSIVE DISORDER (HCC): ICD-10-CM

## 2023-03-15 DIAGNOSIS — F51.04 PSYCHOPHYSIOLOGICAL INSOMNIA: ICD-10-CM

## 2023-03-15 RX ORDER — ESCITALOPRAM OXALATE 20 MG/1
20 TABLET ORAL DAILY
Qty: 90 TABLET | Refills: 2 | Status: SHIPPED | OUTPATIENT
Start: 2023-03-15

## 2023-03-15 NOTE — BH TREATMENT PLAN
TREATMENT PLAN (Medication Management Only)        [address] [facility]    Name and Date of Birth: Bren Lawton 42 y o  1985  Date of Treatment Plan: March 15, 2023  Diagnosis/Diagnoses:    1  Generalized anxiety disorder    2  Mild episode of recurrent major depressive disorder ([facility] [address] )    3  Psychophysiological insomnia    4  Moderate episode of recurrent major depressive disorder [facility])      Strengths/Personal Resources for Self-Care: supportive family, supportive friends, taking medications as prescribed, ability to adapt to life changes, ability to communicate needs, ability to communicate well, ability to listen, ability to negotiate basic needs, being resoureceful, self-reliance, sense of humor  Area/Areas of need (in own words): anxiety symptoms, depressive symptoms  1  Long Term Goal: maintain control of depression  Target Date:6 months - 9/15/2023  Person/Persons responsible for completion of goal: Kevin Flores  2  Short Term Objective (s) - How will we reach this goal?:   A  Provider new recommended medication/dosage changes and/or continue medication(s): continue current medications as prescribed  B  Attend medication management appointments regularly  C  Take psychiatric medications responsibly  D  Start virtual therapy  E  Manage blood pressure and sugars better   Target Date:6 months - 9/15/2023  Person/Persons Responsible for Completion of Goal: Kevin Flores  Progress Towards Goals: stable, continuing treatment  Treatment Modality: medication management every 3 months  Review due 180 days from date of this plan: 6 months - 9/15/2023  Expected length of service: ongoing treatment  My Physician/PA/NP and I have developed this plan together and I agree to work on the goals and objectives  I understand the treatment goals that were developed for my treatment  Treatment Plan completed with assistance and input from patient and verbal consent provided   Treatment plan was not signed at time of office visit secondary to COVID-19 social distancing guidelines

## 2023-03-15 NOTE — PSYCH
MEDICATION MANAGEMENT NOTE        [facility]      Name and Date of Birth: Erik Mckeon 42 y o  1985 MRN: [id number]    Date of Visit: March 15, 2023    Reason for Visit: Follow-up visit for medication management       SUBJECTIVE:    Piter Ugalde is a 42 y o  male with past psychiatric history significant for major depressive disorder, generalized anxiety, cannabis use (medical) and insomnia who was personally seen and evaluated today at the [address] [place] [address] E outpatient clinic for follow-up and medication management  Shimon Kenney presents as pleasant and cooperative  His thoughts are linear and organized  He completes assessment without difficulty  Shimon Kenney endorses compliance with psychotropic medication regimen consisting of Lexapro, PRN Trazodone, and PRN Hydroxyzine  He denies adverse medication side effects  Paula Calhoun reports mood stability since last visit  He denies new onset depressive symptoms or anxiety  He states that he experiences "hills and valleys" but "his valleys are less low"  He currently rates his mood as a "8/10" (10 being the best he's ever felt, 0 being the worst)  His sleep is appropriate  His appetite is stable  His energy and motivation fluctuate secondary to blood sugar control and physical health  He denies SI/HI  He remains optimistic about renal transplantation  He denies new onset anhedonia, crying spells, or hopelessness  His anxiety is well controlled  No recent panic symptoms  He is not tense or restless today  Paula Calhoun voices interest in returning to psychotherapy, suggestive of self preservation  We discussed likely benefit of more frequent therapy visits (every 1-2 weeks rather than monthly which what he was doing) and potentially trialling a therapy program online (virtually) given struggles with transportation  He will contact [employer] or other online agencies by the weekend   During today's examination, Malu Ramirez does not exhibit objective evidence of aziza/hypomania or caprice psychosis  Savannah Astudillo denies recent ETOH or illicit substance abuse  He continues to use medical cannabis  He offers no further concerns  Current Rating Scores:     None completed today  Review Of Systems:      Constitutional fluctuating energy level and as noted in HPI   ENT negative   Cardiovascular negative   Respiratory negative   Gastrointestinal negative   Genitourinary negative   Musculoskeletal arthralgias and joint pain   Integumentary negative   Neurological negative   Endocrine negative   Other Symptoms none, all other systems are negative       Past Psychiatric History: (unchanged information from previous note copied and italicized) - Information that is bolded has been updated       Inpatient psychiatric admissions: Denies  Prior outpatient psychiatric linkage: Denies  Past/current psychotherapy: Currently linked with [facility]  History of suicidal attempts/gestures: Denies  History of violence/aggressive behaviors: Denies  Psychotropic medication trials: Lexapro (3 years ago), Zoloft, Trazodone (now)  Substance abuse inpatient/outpatient rehabilitation: Denies     Substance Abuse History: (unchanged information from previous note copied and italicized) - Information that is bolded has been updated       No history of ETOH or illict substance abuse  Savannah Astudillo does endorse previous tobacco abuse and current medical cannabis prescription  No past legal actions or arrests secondary to substance intoxication  The patient denies prior DWIs/DUIs  No history of outpatient/inpatient rehabilitation programs   Mateus does not exhibit objective evidence of substance withdrawal during today's examination nor does Mateus appear under the influence of any psychoactive substance           Social History: (unchanged information from previous note copied and italicized) - Information that is bolded has been updated       Developmental: Denies a history of milestone/developmental delay  Denies a history of in-utero exposure to toxins/illicit substances  There is no documented history of IEP or need for special education  He was born and raised in [place], [place]  He lived there for 27 years  He [facility] in 2011 to live with his sister and work as   His parents have since relocated and he now lives with them    [address] [facility] - [address] [employer] class of 2006 - degree in theatre   Marital history: single  Living arrangement, social support: parents - has a sister who has children (nephews and nieces)   Occupational History: on permanent disability  Access to firearms: Denies direct access to weapons/firearms  Mateus Mckeon has no history of arrests or violence with a deadly weapon       Traumatic History: (unchanged information from previous note copied and italicized) - Information that is bolded has been updated      Abuse:none is reported  Other Traumatic Events: 2 CVAs - 2015 and 2018      Past Medical History:    Past Medical History:   Diagnosis Date   • Acute kidney injury ([facility] [address] )    • Ambulates with cane    • Anuria    • Anxiety    • Cellulitis of right elbow 03/31/2021   • Chronic kidney disease    • Depression    • Diabetes mellitus ([address] )    • Diarrhea    • Emesis 10/24/2020   • End stage renal disease ([facility] [address] ) 02/11/2018    Formatting of this note might be different from the original  Last Assessment & Plan:  Secondary to DM  On nightly PD  Followed by [facility]    Patient considering transplant for kidney and pancreas through [address] of this note might be different from the original  Last Assessment & Plan:  Formatting of this note might be different from the original  Lab Results  Component Value Date   EGFR    • Falls    • Gastroparesis    • GERD (gastroesophageal reflux disease)    • History of shingles 2010   • History of transfusion 02/2018    no adverse reaction   • Hyperlipidemia    • Hyperphosphatemia • Hypertension    • Hypoglycemia 7/15/2022   • Itching    • Mastoiditis of right side 07/15/2022   • Muscle weakness     general unsteadiness   • Obesity (BMI 30 0-34 9) 09/09/2019   • Orthostatic hypotension 10/25/2020   • Peripheral polyneuropathy 11/20/2019   • PONV (postoperative nausea and vomiting) 01/26/2018   • Protein-calorie malnutrition ([address] ) 11/23/2020   • Recurrent peritonitis ([address] ) due to peritoneal dialysis catheter 07/31/2020   • Retinopathy    • Seizures ([address] )     early 2020 - one time   • Skin abnormality     some dime size areas where skin was scratched from itching   • Spontaneous bacterial peritonitis ([address] ) 10/19/2020   • Squamous cell skin cancer     left temple   • Stroke ([address] )     x2 - off balance/no driving/fatigue   • Swelling of both lower extremities    • Vomiting    • Wears glasses         Past Surgical History:   Procedure Laterality Date   • CARDIAC LOOP RECORDER  05/2018   • COLONOSCOPY     • EGD     • EYE SURGERY Right    • IR AV FISTULAGRAM/GRAFTOGRAM  02/23/2021   • IR TUNNELED CENTRAL LINE PLACEMENT  02/16/2021   • IR TUNNELED DIALYSIS CATHETER PLACEMENT  11/18/2020   • IR TUNNELED DIALYSIS CATHETER REMOVAL  02/12/2021   • IR TUNNELED DIALYSIS CATHETER REMOVAL  03/11/2021   • MOHS SURGERY Left 12/14/2022    Left temple with Dr Colletta Nora   • [address] N/A 08/27/2018    Procedure: UNROOF PD CATHETER;  Surgeon: Susan Fleming DO;  Location: [facility];  Service: General   • WI ARTERIOVENOUS ANASTOMOSIS OPEN DIRECT Left 11/09/2020    Procedure: CREATION FISTULA  ARTERIOVENOUS (AV) - LEFT WRIST;  Surgeon: Constanza Ricketts MD;  [facility];  Service: Vascular   • WI ESOPHAGOGASTRODUODENOSCOPY TRANSORAL DIAGNOSTIC N/A 04/18/2019    Procedure: ESOPHAGOGASTRODUODENOSCOPY (EGD); Surgeon: Fernando Moon MD;  Location: [facility];   Service: Gastroenterology   • WI LAPS INSERTION TUNNELED INTRAPERITONEAL CATHETER N/A 08/06/2018    Procedure: LAPAROSCOPIC PD CATHETER PLACEMENT;  Surgeon: Kimmie Petersen DO;  Location: [facility];  Service: General   • PA REMOVAL TUNNELED INTRAPERITONEAL CATHETER N/A 2020    Procedure: REMOVAL CATHETER PERITONEAL DIALYSIS;  Surgeon: Jonah Saba MD;  [facility];  Service: General   • TONSILLECTOMY     • UPPER GASTROINTESTINAL ENDOSCOPY       Allergies   Allergen Reactions   • Sulfa Antibiotics Rash       Substance Abuse History:    Social History     Substance and Sexual Activity   Alcohol Use Not Currently    Comment: occassionally     Social History     Substance and Sexual Activity   Drug Use Yes   • Types: Marijuana    Comment: medical marijuana       Social History:    Social History     Socioeconomic History   • Marital status: Single     Spouse name: Not on file   • Number of children: Not on file   • Years of education: Not on file   • Highest education level: Not on file   Occupational History   • 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   Tobacco Use   • Smoking status: Former     Packs/day: 0 50     Years: 12 00     Pack years: 6 00     Types: Cigarettes     Quit date: 2018     Years since quittin 1   • Smokeless tobacco: Never   • Tobacco comments:     quit 2018   Vaping Use   • Vaping Use: Every day   • Substances: THC, CBD   Substance and Sexual Activity   • Alcohol use: Not Currently     Comment: occassionally   • Drug use: Yes     Types: Marijuana     Comment: medical marijuana   • Sexual activity: Not on file   Other Topics Concern   • Not on file   Social History Narrative    Caffeine use    single     Social Determinants of Health     Financial Resource Strain: Low Risk    • Difficulty of Paying Living Expenses: Not hard at all   Food Insecurity: Not on file   Transportation Needs: No Transportation Needs   • Lack of Transportation (Medical): No   • Lack of Transportation (Non-Medical):  No   Physical Activity: Not on file   Stress: Not on file   Social Connections: Not on file Intimate Partner Violence: Not on file   Housing Stability: Not on file       Family Psychiatric History:     Family History   Problem Relation Age of Onset   • Breast cancer Mother    • Hypertension Mother    • Hyperlipidemia Father    • Hypertension Father    • Leukemia Maternal Grandmother    • Hyperlipidemia Maternal Grandfather    • Hypertension Maternal Grandfather    • Hyperlipidemia Paternal Grandmother    • Hypertension Paternal Grandmother    • Heart disease Paternal Grandfather         cardiac disorder   • Diabetes Paternal Grandfather        History Review: The following portions of the patient's history were reviewed and updated as appropriate: allergies, current medications, past family history, past medical history, past social history, past surgical history and problem list          OBJECTIVE:     Vital signs in last 24 hours: There were no vitals filed for this visit      Mental Status Evaluation:    Appearance age appropriate, casually dressed, dressed appropriately, looks stated age, wearing a hat   Behavior pleasant, cooperative, calm   Speech normal rate, normal volume, normal pitch   Mood euthymic   Affect normal range and intensity, appropriate   Thought Processes organized, logical, goal directed   Associations intact associations   Thought Content no overt delusions   Perceptual Disturbances: no auditory hallucinations, no visual hallucinations   Abnormal Thoughts  Risk Potential Suicidal ideation - None at present  Homicidal ideation - None at present  Potential for aggression - No   Orientation oriented to person, place, time/date and situation   Memory recent and remote memory grossly intact   Consciousness alert and awake   Attention Span Concentration Span attention span and concentration are age appropriate   Intellect appears to be of average intelligence   Insight intact and good   Judgement intact and good   Muscle Strength and  Gait unstable gait, uses cane   Motor activity no abnormal movements   Language no difficulty naming common objects   Fund of Knowledge adequate knowledge of current events   Pain mild   Pain Scale Did not ask patient to formally rate       Laboratory Results: I have personally reviewed all pertinent laboratory/tests results    Recent Labs (last 2 months):   No visits with results within 2 Month(s) from this visit     Latest known visit with results is:   Admission on 01/15/2023, Discharged on 01/15/2023   Component Date Value   • WBC 01/15/2023 6 35    • RBC 01/15/2023 2 91 (L)    • Hemoglobin 01/15/2023 9 4 (L)    • Hematocrit 01/15/2023 28 5 (L)    • MCV 01/15/2023 98    • MCH 01/15/2023 32 3    • MCHC 01/15/2023 33 0    • RDW 01/15/2023 13 2    • MPV 01/15/2023 9 3    • Platelets 23/52/8666 245    • nRBC 01/15/2023 0    • Neutrophils Relative 01/15/2023 62    • Immat GRANS % 01/15/2023 1    • Lymphocytes Relative 01/15/2023 23    • Monocytes Relative 01/15/2023 6    • Eosinophils Relative 01/15/2023 7 (H)    • Basophils Relative 01/15/2023 1    • Neutrophils Absolute 01/15/2023 3 95    • Immature Grans Absolute 01/15/2023 0 03    • Lymphocytes Absolute 01/15/2023 1 48    • Monocytes Absolute 01/15/2023 0 40    • Eosinophils Absolute 01/15/2023 0 44    • Basophils Absolute 01/15/2023 0 05    • Sodium 01/15/2023 139    • Potassium 01/15/2023 4 4    • Chloride 01/15/2023 97    • CO2 01/15/2023 32    • ANION GAP 01/15/2023 10    • BUN 01/15/2023 23    • Creatinine 01/15/2023 7 48 (H)    • Glucose 01/15/2023 218 (H)    • Calcium 01/15/2023 8 1 (L)    • AST 01/15/2023 16    • ALT 01/15/2023 11    • Alkaline Phosphatase 01/15/2023 89    • Total Protein 01/15/2023 6 6    • Albumin 01/15/2023 3 9    • Total Bilirubin 01/15/2023 0 64    • eGFR 01/15/2023 8    • Ventricular Rate 01/15/2023 71    • Atrial Rate 01/15/2023 71    • TX Interval 01/15/2023 174    • QRSD Interval 01/15/2023 78    • QT Interval 01/15/2023 462    • QTC Interval 01/15/2023 502    • P Axis 01/15/2023 58    • QRS Axis 01/15/2023 54    • T Wave Axis 01/15/2023 33    • hs TnI 0hr 01/15/2023 17        Suicide/Homicide Risk Assessment:    The following interventions are recommended: no intervention changes needed      Lethality Statement:    Based on today's assessment and clinical criteria, Giovanna Reid contracts for safety and is not an imminent risk of harm to self or others  Outpatient level of care is deemed appropriate at this current time  Akanksha Mancuso understands that if they can no longer contract for safety, they need to call the office or report to their nearest Emergency Room for immediate evaluation  Assessment/Plan:     Juan Romeo a 37 y  o  male with past psychiatric history significant for major depressive disorder, generalized anxiety, cannabis use (medical) and insomnia who was personally seen and evaluated today at the [address] [place] [address] [address] outpatient clinic for follow-up and medication management  Bill endorses an extensive medical history complicated by 2 cerebral vascular accidents in 2015 and 2018  Srikanth Goldstein states that the etiology of these CVAs is unknown but suspected to be secondary to unmanaged diabetes mellitus, hypertension, and history of nicotine use  Srikanth Goldstein states that his second CVA in 2018 was far more debilitating than the first and result in significant impairment in occupational and social functionality  He now ambulates slowly with a walking cane and is on disability  He is no longer independent and requires transportation assistance via family  He also had to move back in with his parents which has been distressing  Over the last few years, Srikanth Goldstein states that he was placed on the [facility] [place] renal transplant list and was awaiting a harvested kidney  In October 2020, in the context of familial discord and recent verbal disagreement with mother, Srikanth Goldstein voiced vague and fleeting thoughts of self-harm to his dialysis nurse   She was perturbed by these statements and referred him to his PCP  While being evaluated by his PCP, Herminio Moreno was then sent to the ED where he was ultimately given psychiatric resources in the community and discharged home  Unfortunately, as a result of this incident, Herminio Moreno was removed from the transplant list for "1 year"  This is particularly disconcerting as Herminio Moreno did not actually harm himself or engage in any self-destructive behavior  He was transparent with his treatment team and emotional raw after a disagreement with his mother  By undergoing dialysis at the exact moment he voiced these concerns to his nurse, he was seeking treatment and thus, acting in a self-preserving manner, suggestive of a will to live  Herminio Moreno is frustrated regarding being removed from the renal transplant process and is seeking transplant via [facility]  His frustration regarding this process again, is representative of future-orientation and a will to live        Historically, Mateus denies most neurovegetative symptomatology suggestive of major depressive disorder or dysthymia  Mateus does admit to fragmented or non-restorative sleep that ultimately contributes to anergia and amotivation  He has lost his sense of connectivity and purpose as a result of his CVAs and physical limitations, however, he is engaging in therapy and now medication management to regain these  Mateus adamantly denies acute thoughts of suicide or self-harm  Paulo denies acute anxiety that is overwhelming but does repot historical symptomatology suggestive of pathologic/overwhelming anxiety  He does not experience daily or weekly panic attacks  He admits to mild nervousness and fearfulness regarding his health, which is appropriate  He does not feel on-edge, restless, or perpetually irritable  Mateus vehemently denies any acute or chronic history suggestive of an underlying affective (bipolar) organization  rachel denies historical symptomatology suggestive of an underlying psychotic process     Today's Plan:     Psychopharmacologically, Paulo reports benefit and tolerability with current regimen  He denies adverse medication side effects or need for intervention  DSM-V Diagnoses:      1 ) Major depressive disorder (moderate, recurrent)  2 ) Generalized Anxiety Disorder  3 ) Medical Cannabis Use  4 ) Insomnia        Treatment Recommendations/Precautions:        1 ) Major depressive disorder (moderate, recurrent)  - Continue Lexapro 20mg Daily  - Seek out virtual therapy by next visit  - Psychoeducation provided regarding the importance of exercise and healthy dietary choices and their impact on mood, energy, and motivation  - Counseled to avoid ETOH, illict substances, and nicotine secondary to the detrimental effects of these substances on mental and physical health  - Encouraged to engage in non-verbal forms of therapy such as art therapy, music therapy, and mindfulness    2 ) Generalized Anxiety Disorder  - Continue Lexapro 20mg Daily  - Continue PRN Hydroxyzine 10mg         3  ) Medical Cannabis Use  - Counseled to avoid ETOH, illict substances, and nicotine secondary to the detrimental effects of these substances on mental and physical health  - No concerns for misuse        4 ) Insomnia   - Continue Trazodone 25mg QHS PRN        Medication management every 3 months  Will start individual therapy with own therapist  Aware of need to follow up with family physician for medical issues  Aware of 24 hour and weekend coverage for urgent situations accessed by calling [facility] main practice number    Medications Risks/Benefits      Risks, Benefits And Possible Side Effects Of Medications:    Risks, benefits, and possible side effects of medications explained to Alma Still including risk of suicidality and serotonin syndrome related to treatment with antidepressants  He verbalizes understanding and agreement for treatment      Controlled Medication Discussion:     Not applicable    Psychotherapy Provided:     Individual psychotherapy provided: No     Treatment Plan:    Completed and signed during the session: Yes - with Garrett Nuñez      Visit Time    Visit Start Time: 10:30 AM  Visit Stop Time: 11:00 AM  Total Visit Duration: 30 minutes     The total visit duration detailed above includes: patient engagement, medication management, psychotherapy/counseling, discussion regarding treatment goals, and coordination of care  Note Share Disclaimer:      This note was not shared with the patient due to reasonable likelihood of causing patient harm      Nina Calhoun MD  Board Certified Diplomate of the American Board of Psychiatry and Neurology  03/15/23

## 2023-04-29 NOTE — LETTER
March 5, 2018     Louis Buck 89    Patient: Isabelle Handley   YOB: 1985   Date of Visit: 3/5/2018       Dear Dr Marquis Maldonado: Thank you for referring Naveed Robertson to me for evaluation  Below are my notes for this consultation  If you have questions, please do not hesitate to call me  I look forward to following your patient along with you  Sincerely,        Pierre Martínez MD        CC: DO Marbella Luque MD Kelly A Inocencio Lean, MD  3/5/2018  5:29 PM  Sign at close encounter  Oncology Consult Note  Isabelle Handley 27 y o  male MRN: [id number]  Unit/Bed#:  Encounter: [id number]      Presenting Complaint: prothrombin gene mutation, history of stroke x2    History of Presenting Illness: 15-year-old  male with past medical history of hypertension, type 1 diabetes mellitus, insulin dependent, had a history of stroke at [facility] in 2015, he was admitted to the hospital in February 2018 with hypertensive crisis, was found to have weakness consistent with ischemic pontine area  Stroke, he was evaluated by Neurology, thrombophilia profile showed heterozygous for prothrombin gene mutation, elevation of homocystine at 16  He also has acute chronic kidney disease imposed on a chronic kidney disease with creatinine of 5, anemia secondary to chronic kidney insufficiency with hemoglobin of 8 9, normal iron studies  Currently on folic acid, Plavix 75 mg p o   Daily, aspirin 81 mg daily   Prior to the current admission to the hospital the patient told me that he was taking baby aspirin like 3 to 4 times a week  He was evaluated by Neurology as well   He smokes 1 pack of cigarettes daily   He uses marijuana intermittently  He does not drink  No family history of blood disorders    Review of Systems - As stated in the HPI otherwise the fourteen point review of systems was negative      Past Medical History:   Diagnosis Date    [facility] kidney injury ([facility] [address] )     Cerebellar stroke side undetermined 2015 2013    Diabetes type 1, controlled ([facility] [address] )     Hypertension        Social History     Social History    Marital status: Single     Spouse name: N/A    Number of children: N/A    Years of education: N/A     Occupational History          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     Social History Main Topics    Smoking status: Former Smoker     Packs/day: 0 50     Years: 12 00     Types: Cigarettes    Smokeless tobacco: Never Used      Comment: quit 2/2018    Alcohol use Yes      Comment: socially    Drug use: Yes     Frequency: 3 0 times per week     Types: Marijuana      Comment: weekly    Sexual activity: Not Asked     Other Topics Concern    None     Social History Narrative    Caffeine use    single       Family History   Problem Relation Age of Onset    Breast cancer Mother     Hyperlipidemia Father     Hypertension Father     Leukemia Maternal Grandmother     Hyperlipidemia Maternal Grandfather     Hypertension Maternal Grandfather     Hyperlipidemia Paternal Grandmother     Hypertension Paternal Grandmother     Heart disease Paternal Grandfather      cardiac disorder    Diabetes Paternal Grandfather        Allergies   Allergen Reactions    Sulfa Antibiotics Rash         Current Outpatient Prescriptions:     aspirin 81 mg chewable tablet, Chew 2 tablets (162 mg total) daily (Patient taking differently: Chew 81 mg daily  ), Disp: 30 tablet, Rfl: 0    atorvastatin (LIPITOR) 40 mg tablet, Take 1 tablet (40 mg total) by mouth every evening, Disp: 30 tablet, Rfl: 0    B-D ULTRAFINE III SHORT PEN 31G X 8 MM MISC, Inject under the skin 4 (four) times a day, Disp: 400 each, Rfl: 5    chlorthalidone (HYGROTEN) 50 MG tablet, Take 1 tablet (50 mg total) by mouth daily, Disp: 30 tablet, Rfl: 5    cloNIDine (CATAPRES) 0 1 mg tablet, Take 1 tablet (0 1 mg total) by mouth every 8 (eight) hours as needed for high blood pressure (For SBP greater then 180), Disp: 90 tablet, Rfl: 3    clopidogrel (PLAVIX) 75 mg tablet, Take 1 tablet (75 mg total) by mouth daily, Disp: 30 tablet, Rfl: 0    ergocalciferol (VITAMIN D2) 50,000 units, Take 1 capsule (50,000 Units total) by mouth once a week for 10 doses, Disp: 8 capsule, Rfl: 0    escitalopram (LEXAPRO) 10 mg tablet, Take 1 tablet (10 mg total) by mouth daily, Disp: 30 tablet, Rfl: 0    folic acid (FOLVITE) 1 mg tablet, Take 1 tablet (1 mg total) by mouth daily, Disp: 30 tablet, Rfl: 0    hydrALAZINE (APRESOLINE) 100 MG tablet, Take 1 tablet (100 mg total) by mouth 3 (three) times a day, Disp: 270 tablet, Rfl: 3    insulin glargine (LANTUS) 100 units/mL subcutaneous injection, Inject 8 Units under the skin daily at bedtime, Disp: 10 mL, Rfl: 0    insulin glargine (LANTUS) 100 units/mL subcutaneous injection, Inject 8 Units under the skin daily with breakfast, Disp: 10 mL, Rfl: 0    insulin lispro (HumaLOG) 100 units/mL injection, Inject 5 Units under the skin 3 (three) times a day with meals U-100 pen, Disp: 10 mL, Rfl: 0    labetalol (NORMODYNE) 300 mg tablet, Take 1 tablet (300 mg total) by mouth every 8 (eight) hours, Disp: 270 tablet, Rfl: 3    NIFEdipine ER (ADALAT CC) 60 MG 24 hr tablet, Take 1 tablet (60 mg total) by mouth 2 (two) times a day, Disp: 180 tablet, Rfl: 3    ondansetron (ZOFRAN) 4 mg tablet, Take 1 tablet (4 mg total) by mouth every 8 (eight) hours as needed for nausea or vomiting, Disp: 20 tablet, Rfl: 0    torsemide (DEMADEX) 20 mg tablet, Take 1 tablet (20 mg total) by mouth daily, Disp: 180 tablet, Rfl: 3      /90   Pulse 88   Temp 98 5 °F (36 9 °C) (Tympanic)   Resp 16   Ht 5' 10 5" (1 791 m)   Wt 96 6 kg (213 lb)   SpO2 98%   BMI 30 13 kg/m²        General Appearance:    Alert, oriented        Eyes:    PERRL   Ears:    Normal external ear canals, both ears   Nose:   Nares normal, septum midline Throat:   Mucosa moist  Pharynx without injection  Neck:   Supple       Lungs:     Clear to auscultation bilaterally   Chest Wall:    No tenderness or deformity    Heart:    Regular rate and rhythm       Abdomen:     Soft, non-tender, bowel sounds +, no organomegaly           Extremities:   Extremities no cyanosis or edema       Skin:   no rash or icterus  Lymph nodes:   Cervical, supraclavicular, and axillary nodes normal   Neurologic:   CNII-XII intact, normal strength, weakness of the left side             No results found for this or any previous visit (from the past 48 hour(s))  Vas Renal Artery Complete    Result Date: 2/12/2018  Narrative:  [facility] REPORT [facility]INICAL: Indications:  Benign Essential Hypertension [I10]  Presents with mild HA and nausea and systolic pressure over [id number]  Risk Factors The patient has history of obesity, hypertension, diabetes (Type I) and previous smoking (quit 1-5yrs ago)  H/O 2 strokes, CKD In July of 2015 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 and also heterozygote for prothrombin gene mutation Clinical Right Pressure:  165/88 mm Hg  FINDINGS:  Unilateral     Impression      PSV (cm/s)  Sup-Otilia Ao                            178  Px Inf-Jitendra Ao                         147  Ds Inf-Jitendra Ao                         106  Prox   SMA      Not visualized               Right          Impression  PSV (cm/s)  EDV (cm/s)   RAR    RI  Kidney (cm)  Ostial Renal                      135          27  0 76                     Prox Renal     Normal             141          21  0 79  0 85               Mid Renal                         115          26  0 64  0 81               Dist Renal                         60          12  0 34  0 81               Celiac Artery                      27           8        0 71               Kidney 11 20  Renal          Patent                                                        Left           Impression      PSV (cm/s)  EDV (cm/s)   RAR    RI  Kidney (cm)  Prox Renal     Not Visualized                                                   Dist Renal                             80              0 45                     Celiac Artery                          17           3        0 82               Kidney                                                                   12 10     CONCLUSION:  Impression The abdominal aorta is widely patent and normal caliber  RIGHT RENAL: No evidence of significant arterial occlusive disease in the main renal artery  Patent renal vein Adequate parenchymal flow noted with a renovascular resistive index of  71  Renal/Aorta Ratio:  79   The Kidney measures 11 2 cm  LEFT RENAL: The renovascular resistive index of  82 may suggest intrinsic parenchymal disease The Kidney measures 12 cm  Tech note: Limited study due to excessive bowel gas left renal artery and vein and mesenteric arteries not seen  SIGNATURE: Electronically Signed by: Onel Patrick on 2018-02-12 12:40:26 PM    Xr Chest Portable Icu    Result Date: 2/12/2018  Narrative: CHEST INDICATION:  Dyspnea  COMPARISON:  1/22/2018 VIEWS:   AP frontal IMAGES:  1 FINDINGS: Heart size within normal limits for portable technique  Normal pulmonary vasculature  New right lower lobe consolidation  No pneumothorax or effusion  Bones are unremarkable  Impression: Right lower lobe pneumonia  The study was marked in EPIC for significant notification  Workstation performed: [id number]       Assessment and plan:  1   Recurrent thrombotic stroke in the  Pontine area of the brain, in a patient who had initial stroke in 2015 at [facility], his mother told me he was not compliant with his insulin, blood pressure medication, he presented to the hospital with hypertensive crisis, uncontrolled diabetes, acute kidney disease with creatinine of 5, anemia with hemoglobin of 8 9, evaluation by Neurology late 2 thrombophilia profile showed prothrombin gene mutation,  Hyper homocystinemia   Currently on Plavix and aspirin  2  The patient was partially compliant with aspirin prior to the this presentation  3  Plavix 75 mg p o  Daily indefinitely  4  Folic acid 1-2 mg p o  Daily  5  Follow-up in 3 months with CBC, homocystine level, MTHFR mutation  6   No need for Aranesp at this time because of recent stroke, he is asymptomatic from anemia point of view    Plan: Yes (specify)

## 2023-05-10 ENCOUNTER — OFFICE VISIT (OUTPATIENT)
Dept: NEPHROLOGY | Facility: CLINIC | Age: 38
End: 2023-05-10

## 2023-05-10 VITALS
HEART RATE: 75 BPM | WEIGHT: 216 LBS | SYSTOLIC BLOOD PRESSURE: 140 MMHG | HEIGHT: 70 IN | BODY MASS INDEX: 30.92 KG/M2 | DIASTOLIC BLOOD PRESSURE: 84 MMHG

## 2023-05-10 DIAGNOSIS — Z01.818 PRE-TRANSPLANT EVALUATION FOR ESRD (END STAGE RENAL DISEASE): Primary | ICD-10-CM

## 2023-05-10 NOTE — PROGRESS NOTES
NEPHROLOGY OFFICE VISIT   Malik Mckeon 42 y o  male MRN: [id number]  5/10/2023    Reason for Visit: pre renal transplant f/u    ASSESSMENT and PLAN:    I had the pleasure of seeing Mr Brittani Higginbotham today in the renal clinic for the continued management of pre renal transplant f/u  Jessy Stern a 42 y  o  male  male referred by Dr Daron Guardado a past medical history of ESRD on peritoneal dialysis prior and now on hemodialysis at [facility] to having peritonitis with peritoneal dialysis, hypertension for approximately 4-6 years, diabetes diagnosed at 1years old type 1, CVA 1st in 2015 and again in 2018, heterozygous for prothrombin gene mutation 20210, 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, severe RACHEAL, squamous cell carcinoma removed October 2022, hypertension, smoker (for 8 years), rare ETOH, uses medical marijuana, hyperlipidemia seen in the Nephrology Clinic for pre-transplant kidney evaluation follow up      ESRD presumed to be due to DM/HTN    On HD since 2018      Native disease secondary to diabetes/hypertension not biopsy proven     Prior MRI brain -new area of T2 hyperintensity in the right middle and right inferior cerebellar peduncle with resolution of a focus of hyperintensity in the right subinsular region   Suggesting dissemination in time and space  Laurel Dynes is concern for demyelinating disease      Renal Doppler study with no evidence of occlusive disease bilaterally   There is possible intrinsic parenchymal disease on the left kidney   Left renal artery was not able to be evaluated due to bowel gas      CT scan of the abdomen and pelvis in November 2020 with moderate ascites, reason Deon and omental in edema stable from prior, otherwise no acute abnormality      CT scan in November 2020 with small pulmonary nodule the right middle lobe      Echocardiogram with SANJEEV in 2018 with EF 60%      ECHO in July 2020 - EF 66%; mild diastolic dysfunction;      Stress test is nl in 2/2019 July 2021-patient underwent right left heart catheterization  Mild-to-moderate elevated LVE DS, moderate pulmonary hypertension, no significant CAD, mid RCA 40% stenosis     October 2021-patient saw pulmonologist due to incidental finding of nodules in the lung  Recommended repeat CT scan in 2022  But no absolute contraindication from pulmonary standpoint for renal transplantation      October 2021-patient saw [facility] Cardiology team     December 2021-patient saw pulmonary hypertensive specialist at [facility] -was felt to be due to secondary to volume overload  Pulmonary vasodilators was contraindicated  Had repeat echocardiogram December 2021  States that there was reassuring features of normal RV size and function  RVSP only 29  Therefore euvolemic   -patient did not show features of moderate pulmonary hypertension that was seen 5 months prior  Patient has sleep study with severe RACHEAL in July 2022 July 2022-patient was admitted for hypoglycemia  Insulin regimen was adjusted  September 2022-patient saw [facility] hematology- the clinical significance is unclear but there may be some association for arterial thrombus and prothrombin gene mutation  thrombophilia work-up does indicate patient has high risk for VTE in the perioperative period    October 2022-squamous cell carcinoma removed from left temple and status post Mohs procedure December 2022  24 [facility] pulmonology team - pulm HTN felt to be 2/2 to vol overload  - ECHO EF 60%      Plan      Will review the patient's case with the transplant committee meeting for yearly follow-up  Severe RACHEAL? 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 heterozygote for prothrombin gene mutation   Patient followed with hematology and neurology prior   On plavix and aspirin  -Recommendation-high risk of VTE in the perioperative period and DVT proph in perioperative period     History of gastroparesis     Noted cardiology plan to remove loop recorder-awaiting  Patient will talk to his cardiologist     Will reach out to Dr Edwin Vega pt nephrologist - I have sent task to office to reach Dr Edwin Vega to review cardiology note from [facility] regarding recommendations regarding volume  L medial ankle < 1 cm skin abrasion well healing    I have messaged Dr Alcides Hong regarding 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 also  Rosemarie Barboza spoke to Dr Edwin Vega further management of dialysis and ultrafiltration is solely to the dialysis team   On 5/10 over the phone  He is aware that the patient is having volume issues but he has not seen pulmonary team notes from [facility] and he will be reviewing these notes this month  But they have been attempting to ultrafiltrate the patient with the patient is not tolerating as well  There is also question of compliance  Further management of dialysis is slowly to the dialysis team   No problem-specific Assessment & Plan notes found for this encounter  HPI:    No recent fevers, chills, vomiting  Has nausea 1-2 times per week  Has insulin pump with improvement in blood sugars    PATIENT INSTRUCTIONS:    Patient Instructions   1) We will review your case at the transplant committee meeting and will review our decision with you in approximately 4 weeks over the phone    2) If you do not receive a call from [facility] within 4 weeks, please call our local Nephrology "office  3) From a renal transplant evaluation purpose, we will see you once a year in our local office for medical follow-up  4) Once we call you with our decision regarding further evaluation, you will receive further instruction over the phone and in the mail regarding the next steps to complete the transplant evaluation  5) All of your non transplant evaluation follow up regarding your regular medical follow ups, your renal care follow ups, and any other specialists visits you are following with should continue as you are advised by those respective physicians and continue to follow with their management and care  6) please talk to Dr Jason Cueva about updating your CT chest this year for pulm nodule f/u  7) PLEASE CONTINUE TO FOLLOW YOUR MEDICATION LIST AS YOU ARE DOING  8) please see your podiatrist to f/u regarding skin wound on leg as you are going to end of this month but see them sooner if the healing does not continue        OBJECTIVE:  Current Weight: Weight - Scale: 98 kg (216 lb)  Vitals:    05/10/23 1406   BP: 140/84   BP Location: Right arm   Patient Position: Sitting   Cuff Size: Standard   Pulse: 75   Weight: 98 kg (216 lb)   Height: 5' 9 5\" (1 765 m)    Body mass index is 31 44 kg/m²  REVIEW OF SYSTEMS:    Review of Systems   Constitutional: Negative  Negative for appetite change and fatigue  HENT: Negative  Eyes: Negative  Respiratory: Negative  Negative for shortness of breath  Cardiovascular: Negative  Negative for leg swelling  Gastrointestinal:        Has periodic episodes of nausea  Rare vomiting  None recently  Endocrine: Negative  Genitourinary: Negative  Negative for difficulty urinating  Musculoskeletal: Negative  Allergic/Immunologic: Negative  Neurological: Negative  Hematological: Negative  Psychiatric/Behavioral: Negative  All other systems reviewed and are negative  PHYSICAL EXAM:      Physical Exam  Vitals and nursing note reviewed   " Constitutional:       General: He is not in acute distress  Appearance: He is well-developed  He is not diaphoretic  HENT:      Head: Normocephalic and atraumatic  Eyes:      General: No scleral icterus  Right eye: No discharge  Left eye: No discharge  Conjunctiva/sclera: Conjunctivae normal    Neck:      Vascular: No JVD  Cardiovascular:      Rate and Rhythm: Normal rate and regular rhythm  Heart sounds: No murmur heard  No friction rub  No gallop  Pulmonary:      Effort: Pulmonary effort is normal  No respiratory distress  Breath sounds: Normal breath sounds  No wheezing or rales  Chest:      Chest wall: No tenderness  Abdominal:      General: Bowel sounds are normal  There is no distension  Palpations: Abdomen is soft  Tenderness: There is no abdominal tenderness  There is no rebound  Comments: No rashes   Musculoskeletal:         General: No tenderness or deformity  Normal range of motion  Cervical back: Normal range of motion and neck supple  Right lower leg: No edema  Left lower leg: No edema  Comments: On medial side of left ankle, has small less than 1 cm skin wound that is well-healing  I advised the patient to talk to his podiatrist     Left upper extremity AV fistula good thrill and bruit   Skin:     General: Skin is warm and dry  Coloration: Skin is not pale  Findings: No erythema or rash  Neurological:      Mental Status: He is alert and oriented to person, place, and time  Cranial Nerves: No cranial nerve deficit  Coordination: Coordination normal       Deep Tendon Reflexes: Reflexes are normal and symmetric  Psychiatric:         Behavior: Behavior normal          Thought Content:  Thought content normal          Judgment: Judgment normal          Medications:    Current Outpatient Medications:   •  aspirin (ECOTRIN LOW STRENGTH) 81 mg EC tablet, Take 81 mg by mouth daily, Disp: , Rfl:   • atorvastatin (LIPITOR) 40 mg tablet, Take 1 tablet (40 mg total) by mouth every evening, Disp: 90 tablet, Rfl: 3  •  b complex vitamins capsule, Take 1 capsule by mouth daily before lunch , Disp: , Rfl:   •  B-D ULTRAFINE III SHORT PEN 31G X 8 MM MISC, USE 1 PEN NEEDLE 8 TIMES DAILY, Disp: 100 each, Rfl: 47  •  calcium acetate (PHOSLO) capsule, TAKE 3 CAPSULES BY MOUTH WITH MEALS, Disp: , Rfl:   •  cinacalcet (SENSIPAR) 60 MG tablet, Take 120 mg by mouth daily 2 tab daily , Disp: , Rfl:   •  cloNIDine (CATAPRES-TTS-3) 0 3 mg/24 hr, 1 patch once a week , Disp: , Rfl:   •  clotrimazole (LOTRIMIN) 1 % cream, Apply to affected area 2 times daily for 2 weeks, Disp: 15 g, Rfl: 0  •  escitalopram (LEXAPRO) 20 mg tablet, Take 1 tablet (20 mg total) by mouth daily, Disp: 90 tablet, Rfl: 2  •  famotidine (PEPCID) 40 MG tablet, Take 1 tablet (40 mg total) by mouth daily, Disp: 90 tablet, Rfl: 1  •  gabapentin (NEURONTIN) 100 mg capsule, Take 2 tablets at bedtime, Disp: 180 capsule, Rfl: 1  •  GLUCAGON EMERGENCY 1 MG injection, , Disp: , Rfl: 3  •  Gvoke HypoPen 1-Pack 1 MG/0 2ML SOAJ, , Disp: , Rfl:   •  hydrALAZINE (APRESOLINE) 25 mg tablet, Take 1 tablet (25 mg total) by mouth 3 (three) times a day, Disp: 270 tablet, Rfl: 0  •  hydrOXYzine HCL (ATARAX) 10 mg tablet, Take 1 tablet (10 mg total) by mouth every 6 (six) hours as needed for itching or anxiety, Disp: 30 tablet, Rfl: 3  •  Insulin Disposable Pump (Omnipod 5 G6 Intro, Gen 5,) KIT, , Disp: , Rfl:   •  insulin glargine (Basaglar KwikPen) 100 units/mL injection pen, Inject 7 Units under the skin daily at bedtime, Disp: 3 mL, Rfl: 0  •  labetalol (NORMODYNE) 200 mg tablet, TAKE 2 TABLETS BY MOUTH THREE TIMES DAILY, Disp: 180 tablet, Rfl: 0  •  lisinopril (ZESTRIL) 40 mg tablet, Take 80 mg by mouth daily, Disp: , Rfl:   •  mupirocin (BACTROBAN) 2 % ointment, Apply topically 3 (three) times a day Until lesion on right hand heals  , Disp: 30 g, Rfl: 4  •  NIFEdipine (PROCARDIA XL) 90 mg 24 hr tablet, Take 1 tablet (90 mg total) by mouth daily, Disp: 30 tablet, Rfl: 0  •  NIFEdipine ER (ADALAT CC) 60 MG 24 hr tablet, Take 1 tablet (60 mg total) by mouth 2 (two) times a day, Disp: 180 tablet, Rfl: 0  •  NovoLOG 100 UNIT/ML injection, INJECT UP TO 50 UNITS DAILY IN INSULIN PUMP, Disp: , Rfl:   •  NovoLOG FlexPen ReliOn 100 UNIT/ML injection pen, , Disp: , Rfl:   •  ofloxacin (FLOXIN) 0 3 % otic solution, Administer 10 drops to the right ear daily, Disp: 5 mL, Rfl: 0  •  ondansetron (ZOFRAN) 4 mg tablet, Take 1 tablet (4 mg total) by mouth every 8 (eight) hours as needed for nausea or vomiting, Disp: 90 tablet, Rfl: 0  •  Patiromer Sorbitex Calcium (VELTASSA PO), Take 5 g by mouth once a week, Disp: , Rfl:   •  saccharomyces boulardii (FLORASTOR) 250 mg capsule, Take 250 mg by mouth daily, Disp: , Rfl:   •  SPS 15 GM/60ML suspension, TAKE 60 ML BY MOUTH SINGLE DOSE FOR EMERGENCY USE DURING MISSED HEMODIALYSIS, Disp: , Rfl:   •  torsemide (DEMADEX) 100 mg tablet, Take 100 mg by mouth daily  , Disp: , Rfl:   •  traZODone (DESYREL) 50 mg tablet, Take 0 5 tablets (25 mg total) by mouth daily at bedtime as needed for sleep, Disp: 30 tablet, Rfl: 3  •  triamcinolone (KENALOG) 0 1 % ointment, Apply topically 2 (two) times a day For up to 14 days on the itchy areas  Avoid groin, underarms and face  It is important to take at least 1 week break between uses  , Disp: 454 g, Rfl: 0    Laboratory Results:        Invalid input(s): ALBUMIN    Results for orders placed or performed during the hospital encounter of 01/15/23   CBC and differential   Result Value Ref Range    WBC 6 35 4 31 - 10 16 Thousand/uL    RBC 2 91 (L) 3 88 - 5 62 Million/uL    Hemoglobin 9 4 (L) 12 0 - 17 0 g/dL    Hematocrit 28 5 (L) 36 5 - 49 3 %    MCV 98 82 - 98 fL    MCH 32 3 26 8 - 34 3 pg    MCHC 33 0 31 4 - 37 4 g/dL    RDW 13 2 11 6 - 15 1 %    MPV 9 3 8 9 - 12 7 fL    Platelets 462 051 - 733 Thousands/uL    nRBC 0 "/100 WBCs    Neutrophils Relative 62 43 - 75 %    Immat GRANS % 1 0 - 2 %    Lymphocytes Relative 23 14 - 44 %    Monocytes Relative 6 4 - 12 %    Eosinophils Relative 7 (H) 0 - 6 %    Basophils Relative 1 0 - 1 %    Neutrophils Absolute 3 95 1 85 - 7 62 Thousands/µL    Immature Grans Absolute 0 03 0 00 - 0 20 Thousand/uL    Lymphocytes Absolute 1 48 0 60 - 4 47 Thousands/µL    Monocytes Absolute 0 40 0 17 - 1 22 Thousand/µL    Eosinophils Absolute 0 44 0 00 - 0 61 Thousand/µL    Basophils Absolute 0 05 0 00 - 0 10 Thousands/µL   Comprehensive metabolic panel   Result Value Ref Range    Sodium 139 135 - 147 mmol/L    Potassium 4 4 3 5 - 5 3 mmol/L    Chloride 97 96 - 108 mmol/L    CO2 32 21 - 32 mmol/L    ANION GAP 10 4 - 13 mmol/L    BUN 23 5 - 25 mg/dL    Creatinine 7 48 (H) 0 60 - 1 30 mg/dL    Glucose 218 (H) 65 - 140 mg/dL    Calcium 8 1 (L) 8 4 - 10 2 mg/dL    AST 16 13 - 39 U/L    ALT 11 7 - 52 U/L    Alkaline Phosphatase 89 34 - 104 U/L    Total Protein 6 6 6 4 - 8 4 g/dL    Albumin 3 9 3 5 - 5 0 g/dL    Total Bilirubin 0 64 0 20 - 1 00 mg/dL    eGFR 8 ml/min/1 73sq m   HS Troponin 0hr (reflex protocol)   Result Value Ref Range    hs TnI 0hr 17 \"Refer to ACS Flowchart\"- see link ng/L   ECG 12 lead   Result Value Ref Range    Ventricular Rate 71 BPM    Atrial Rate 71 BPM    MT Interval 174 ms    QRSD Interval 78 ms    QT Interval 462 ms    QTC Interval 502 ms    P [place] 58 degrees    QRS Axis 54 degrees    T Wave Axis 33 degrees     *Note: Due to a large number of results and/or encounters for the requested time period, some results have not been displayed  A complete set of results can be found in Results Review           "

## 2023-05-10 NOTE — PATIENT INSTRUCTIONS
1) We will review your case at the transplant committee meeting and will review our decision with you in approximately 4 weeks over the phone  2) If you do not receive a call from [facility] within 4 weeks, please call our local Nephrology office  3) From a renal transplant evaluation purpose, we will see you once a year in our local office for medical follow-up  4) Once we call you with our decision regarding further evaluation, you will receive further instruction over the phone and in the mail regarding the next steps to complete the transplant evaluation  5) All of your non transplant evaluation follow up regarding your regular medical follow ups, your renal care follow ups, and any other specialists visits you are following with should continue as you are advised by those respective physicians and continue to follow with their management and care     6) please talk to Dr Caryn Loera about updating your CT chest this year for pulm nodule f/u  7) PLEASE CONTINUE TO FOLLOW YOUR MEDICATION LIST AS YOU ARE DOING  8) please see your podiatrist to f/u regarding skin wound on leg as you are going to end of this month but see them sooner if the healing does not continue

## 2023-05-10 NOTE — LETTER
May 10, 2023     Marcelo Mora, DO  [address]  [address], [place] [id number]    Patient: Pinky Luevano   YOB: 1985   Date of Visit: 5/10/2023       Dear Dr Massimo Chi: Thank you for referring Jason Baca to me for evaluation  Below are my notes for this consultation  If you have questions, please do not hesitate to call me  I look forward to following your patient along with you  Sincerely,        Monalisa uGevara MD        CC: MD Monalisa Briggs MD  5/10/2023  2:47 PM  Sign when Signing Visit  NEPHROLOGY OFFICE VISIT   Gennaro Mckeon 42 y o  male MRN: [id number]  5/10/2023    Reason for Visit: pre renal transplant f/u    ASSESSMENT and PLAN:    I had the pleasure of seeing Mr Eugenia Dennis today in the renal clinic for the continued management of pre renal transplant f/u  Marin Ge a 42 y  o  male  male referred by Dr Mauricio Pinon a past medical history of ESRD on peritoneal dialysis prior and now on hemodialysis at [facility] to having peritonitis with peritoneal dialysis, hypertension for approximately 4-6 years, diabetes diagnosed at 1years old type 1, CVA 1st in 2015 and again in 2018, heterozygous for prothrombin gene mutation 20210, 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, severe RACHEAL, squamous cell carcinoma removed October 2022, hypertension, smoker (for 8 years), rare ETOH, uses medical marijuana, hyperlipidemia seen in the Nephrology Clinic for pre-transplant kidney evaluation follow up      ESRD presumed to be due to DM/HTN    On HD since 2018      Native disease secondary to diabetes/hypertension not biopsy proven     Prior MRI brain -new area of T2 hyperintensity in the right middle and right inferior cerebellar peduncle with resolution of a focus of hyperintensity in the right subinsular region   Suggesting dissemination in time and space  [place] [employer] is concern for demyelinating disease      Renal Doppler study with no evidence of occlusive disease bilaterally   There is possible intrinsic parenchymal disease on the left kidney   Left renal artery was not able to be evaluated due to bowel gas      CT scan of the abdomen and pelvis in November 2020 with moderate ascites, reason Deon and omental in edema stable from prior, otherwise no acute abnormality      CT scan in November 2020 with small pulmonary nodule the right middle lobe      Echocardiogram with SANJEEV in 2018 with EF 60%      ECHO in July 2020 - EF 82%; mild diastolic dysfunction;      Stress test is nl in 2/2019 July 2021-patient underwent right left heart catheterization  Mild-to-moderate elevated LVE DS, moderate pulmonary hypertension, no significant CAD, mid RCA 40% stenosis     October 2021-patient saw pulmonologist due to incidental finding of nodules in the lung  Recommended repeat CT scan in 2022  But no absolute contraindication from pulmonary standpoint for renal transplantation      October 2021-patient saw [facility] Cardiology team     December 2021-patient saw pulmonary hypertensive specialist at [facility] -was felt to be due to secondary to volume overload  Pulmonary vasodilators was contraindicated  Had repeat echocardiogram December 2021  States that there was reassuring features of normal RV size and function  RVSP only 29  Therefore euvolemic   -patient did not show features of moderate pulmonary hypertension that was seen 5 months prior  Patient has sleep study with severe RACHEAL in July 2022 July 2022-patient was admitted for hypoglycemia  Insulin regimen was adjusted  September 2022-patient saw [facility] hematology- the clinical significance is unclear but there may be some association for arterial thrombus and prothrombin gene mutation   thrombophilia work-up does indicate patient has high risk for VTE in the perioperative period    October 2022-squamous cell carcinoma removed from left temple and status post Mohs procedure December 2022  24 [facility] pulmonology team - pulm HTN felt to be 2/2 to vol overload  - ECHO EF 60%      Plan      Will review the patient's case with the transplant committee meeting for yearly follow-up  Severe RACHEAL? 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 heterozygote for prothrombin gene mutation   Patient followed with hematology and neurology prior   On plavix and aspirin  -Recommendation-high risk of VTE in the perioperative period and DVT proph in perioperative period     History of gastroparesis     Noted cardiology plan to remove loop recorder-awaiting  Patient will talk to his cardiologist     Will reach out to Dr Ronnie Stephen pt nephrologist - I have sent task to office to reach Dr Ronnie Stephen to review cardiology note from [facility] regarding recommendations regarding volume  L medial ankle < 1 cm skin abrasion well healing    No problem-specific Assessment & Plan notes found for this encounter  HPI:    No recent fevers, chills, vomiting  Has nausea 1-2 times per week  Has insulin pump with improvement in blood sugars    PATIENT INSTRUCTIONS:    Patient Instructions   1) We will review your case at the transplant committee meeting and will review our decision with you in approximately 4 weeks over the phone  2) If you do not receive a call from [facility] within 4 weeks, please call our local Nephrology office    3) From "a renal transplant evaluation purpose, we will see you once a year in our local office for medical follow-up  4) Once we call you with our decision regarding further evaluation, you will receive further instruction over the phone and in the mail regarding the next steps to complete the transplant evaluation  5) All of your non transplant evaluation follow up regarding your regular medical follow ups, your renal care follow ups, and any other specialists visits you are following with should continue as you are advised by those respective physicians and continue to follow with their management and care  6) please talk to Dr Nathalie Mccormack about updating your CT chest this year for pulm nodule f/u  7) PLEASE CONTINUE TO FOLLOW YOUR MEDICATION LIST AS YOU ARE DOING  8) please see your podiatrist to f/u regarding skin wound on leg as you are going to end of this month but see them sooner if the healing does not continue        OBJECTIVE:  Current Weight: Weight - Scale: 98 kg (216 lb)  Vitals:    05/10/23 1406   BP: 140/84   BP Location: Right arm   Patient Position: Sitting   Cuff Size: Standard   Pulse: 75   Weight: 98 kg (216 lb)   Height: 5' 9 5\" (1 765 m)    Body mass index is 31 44 kg/m²  REVIEW OF SYSTEMS:    Review of Systems   Constitutional: Negative  Negative for appetite change and fatigue  HENT: Negative  Eyes: Negative  Respiratory: Negative  Negative for shortness of breath  Cardiovascular: Negative  Negative for leg swelling  Gastrointestinal:        Has periodic episodes of nausea  Rare vomiting  None recently  Endocrine: Negative  Genitourinary: Negative  Negative for difficulty urinating  Musculoskeletal: Negative  Allergic/Immunologic: Negative  Neurological: Negative  Hematological: Negative  Psychiatric/Behavioral: Negative  All other systems reviewed and are negative  PHYSICAL EXAM:      Physical Exam  Vitals and nursing note reviewed     Constitutional: " General: He is not in acute distress  Appearance: He is well-developed  He is not diaphoretic  HENT:      Head: Normocephalic and atraumatic  Eyes:      General: No scleral icterus  Right eye: No discharge  Left eye: No discharge  Conjunctiva/sclera: Conjunctivae normal    Neck:      Vascular: No JVD  Cardiovascular:      Rate and Rhythm: Normal rate and regular rhythm  Heart sounds: No murmur heard  No friction rub  No gallop  Pulmonary:      Effort: Pulmonary effort is normal  No respiratory distress  Breath sounds: Normal breath sounds  No wheezing or rales  Chest:      Chest wall: No tenderness  Abdominal:      General: Bowel sounds are normal  There is no distension  Palpations: Abdomen is soft  Tenderness: There is no abdominal tenderness  There is no rebound  Comments: No rashes   Musculoskeletal:         General: No tenderness or deformity  Normal range of motion  Cervical back: Normal range of motion and neck supple  Right lower leg: No edema  Left lower leg: No edema  Comments: On medial side of left ankle, has small less than 1 cm skin wound that is well-healing  I advised the patient to talk to his podiatrist     Left upper extremity AV fistula good thrill and bruit   Skin:     General: Skin is warm and dry  Coloration: Skin is not pale  Findings: No erythema or rash  Neurological:      Mental Status: He is alert and oriented to person, place, and time  Cranial Nerves: No cranial nerve deficit  Coordination: Coordination normal       Deep Tendon Reflexes: Reflexes are normal and symmetric  Psychiatric:         Behavior: Behavior normal          Thought Content:  Thought content normal          Judgment: Judgment normal          Medications:    Current Outpatient Medications:   •  aspirin (ECOTRIN LOW STRENGTH) 81 mg EC tablet, Take 81 mg by mouth daily, Disp: , Rfl:   •  atorvastatin (LIPITOR) 40 mg tablet, Take 1 tablet (40 mg total) by mouth every evening, Disp: 90 tablet, Rfl: 3  •  b complex vitamins capsule, Take 1 capsule by mouth daily before lunch , Disp: , Rfl:   •  B-D ULTRAFINE III SHORT PEN 31G X 8 MM MISC, USE 1 PEN NEEDLE 8 TIMES DAILY, Disp: 100 each, Rfl: 47  •  calcium acetate (PHOSLO) capsule, TAKE 3 CAPSULES BY MOUTH WITH MEALS, Disp: , Rfl:   •  cinacalcet (SENSIPAR) 60 MG tablet, Take 120 mg by mouth daily 2 tab daily , Disp: , Rfl:   •  cloNIDine (CATAPRES-TTS-3) 0 3 mg/24 hr, 1 patch once a week , Disp: , Rfl:   •  clotrimazole (LOTRIMIN) 1 % cream, Apply to affected area 2 times daily for 2 weeks, Disp: 15 g, Rfl: 0  •  escitalopram (LEXAPRO) 20 mg tablet, Take 1 tablet (20 mg total) by mouth daily, Disp: 90 tablet, Rfl: 2  •  famotidine (PEPCID) 40 MG tablet, Take 1 tablet (40 mg total) by mouth daily, Disp: 90 tablet, Rfl: 1  •  gabapentin (NEURONTIN) 100 mg capsule, Take 2 tablets at bedtime, Disp: 180 capsule, Rfl: 1  •  GLUCAGON EMERGENCY 1 MG injection, , Disp: , Rfl: 3  •  Gvoke HypoPen 1-Pack 1 MG/0 2ML SOAJ, , Disp: , Rfl:   •  hydrALAZINE (APRESOLINE) 25 mg tablet, Take 1 tablet (25 mg total) by mouth 3 (three) times a day, Disp: 270 tablet, Rfl: 0  •  hydrOXYzine HCL (ATARAX) 10 mg tablet, Take 1 tablet (10 mg total) by mouth every 6 (six) hours as needed for itching or anxiety, Disp: 30 tablet, Rfl: 3  •  Insulin Disposable Pump (Omnipod 5 G6 Intro, Gen 5,) KIT, , Disp: , Rfl:   •  insulin glargine (Basaglar KwikPen) 100 units/mL injection pen, Inject 7 Units under the skin daily at bedtime, Disp: 3 mL, Rfl: 0  •  labetalol (NORMODYNE) 200 mg tablet, TAKE 2 TABLETS BY MOUTH THREE TIMES DAILY, Disp: 180 tablet, Rfl: 0  •  lisinopril (ZESTRIL) 40 mg tablet, Take 80 mg by mouth daily, Disp: , Rfl:   •  mupirocin (BACTROBAN) 2 % ointment, Apply topically 3 (three) times a day Until lesion on right hand heals  , Disp: 30 g, Rfl: 4  •  NIFEdipine (PROCARDIA XL) 90 mg 24 hr tablet, Take 1 tablet (90 mg total) by mouth daily, Disp: 30 tablet, Rfl: 0  •  NIFEdipine ER (ADALAT CC) 60 MG 24 hr tablet, Take 1 tablet (60 mg total) by mouth 2 (two) times a day, Disp: 180 tablet, Rfl: 0  •  NovoLOG 100 UNIT/ML injection, INJECT UP TO 50 UNITS DAILY IN INSULIN PUMP, Disp: , Rfl:   •  NovoLOG FlexPen ReliOn 100 UNIT/ML injection pen, , Disp: , Rfl:   •  ofloxacin (FLOXIN) 0 3 % otic solution, Administer 10 drops to the right ear daily, Disp: 5 mL, Rfl: 0  •  ondansetron (ZOFRAN) 4 mg tablet, Take 1 tablet (4 mg total) by mouth every 8 (eight) hours as needed for nausea or vomiting, Disp: 90 tablet, Rfl: 0  •  Patiromer Sorbitex Calcium (VELTASSA PO), Take 5 g by mouth once a week, Disp: , Rfl:   •  saccharomyces boulardii (FLORASTOR) 250 mg capsule, Take 250 mg by mouth daily, Disp: , Rfl:   •  SPS 15 GM/60ML suspension, TAKE 60 ML BY MOUTH SINGLE DOSE FOR EMERGENCY USE DURING MISSED HEMODIALYSIS, Disp: , Rfl:   •  torsemide (DEMADEX) 100 mg tablet, Take 100 mg by mouth daily  , Disp: , Rfl:   •  traZODone (DESYREL) 50 mg tablet, Take 0 5 tablets (25 mg total) by mouth daily at bedtime as needed for sleep, Disp: 30 tablet, Rfl: 3  •  triamcinolone (KENALOG) 0 1 % ointment, Apply topically 2 (two) times a day For up to 14 days on the itchy areas  Avoid groin, underarms and face  It is important to take at least 1 week break between uses  , Disp: 454 g, Rfl: 0    Laboratory Results:        Invalid input(s): ALBUMIN    Results for orders placed or performed during the hospital encounter of 01/15/23   CBC and differential   Result Value Ref Range    WBC 6 35 4 31 - 10 16 Thousand/uL    RBC 2 91 (L) 3 88 - 5 62 Million/uL    Hemoglobin 9 4 (L) 12 0 - 17 0 g/dL    Hematocrit 28 5 (L) 36 5 - 49 3 %    MCV 98 82 - 98 fL    MCH 32 3 26 8 - 34 3 pg    MCHC 33 0 31 4 - 37 4 g/dL    RDW 13 2 11 6 - 15 1 %    MPV 9 3 8 9 - 12 7 fL    Platelets 897 045 - 381 Thousands/uL    nRBC 0 /100 WBCs "Neutrophils Relative 62 43 - 75 %    Immat GRANS % 1 0 - 2 %    Lymphocytes Relative 23 14 - 44 %    Monocytes Relative 6 4 - 12 %    Eosinophils Relative 7 (H) 0 - 6 %    Basophils Relative 1 0 - 1 %    Neutrophils Absolute 3 95 1 85 - 7 62 Thousands/µL    Immature Grans Absolute 0 03 0 00 - 0 20 Thousand/uL    Lymphocytes Absolute 1 48 0 60 - 4 47 Thousands/µL    Monocytes Absolute 0 40 0 17 - 1 22 Thousand/µL    Eosinophils Absolute 0 44 0 00 - 0 61 Thousand/µL    Basophils Absolute 0 05 0 00 - 0 10 Thousands/µL   Comprehensive metabolic panel   Result Value Ref Range    Sodium 139 135 - 147 mmol/L    Potassium 4 4 3 5 - 5 3 mmol/L    Chloride 97 96 - 108 mmol/L    CO2 32 21 - 32 mmol/L    ANION GAP 10 4 - 13 mmol/L    BUN 23 5 - 25 mg/dL    Creatinine 7 48 (H) 0 60 - 1 30 mg/dL    Glucose 218 (H) 65 - 140 mg/dL    Calcium 8 1 (L) 8 4 - 10 2 mg/dL    AST 16 13 - 39 U/L    ALT 11 7 - 52 U/L    Alkaline Phosphatase 89 34 - 104 U/L    Total Protein 6 6 6 4 - 8 4 g/dL    Albumin 3 9 3 5 - 5 0 g/dL    Total Bilirubin 0 64 0 20 - 1 00 mg/dL    eGFR 8 ml/min/1 73sq m   HS Troponin 0hr (reflex protocol)   Result Value Ref Range    hs TnI 0hr 17 \"Refer to ACS Flowchart\"- see link ng/L   ECG 12 lead   Result Value Ref Range    Ventricular Rate 71 BPM    Atrial Rate 71 BPM    CT Interval 174 ms    QRSD Interval 78 ms    QT Interval 462 ms    QTC Interval 502 ms    P [place] 58 degrees    QRS Axis 54 degrees    T Wave Axis 33 degrees     *Note: Due to a large number of results and/or encounters for the requested time period, some results have not been displayed  A complete set of results can be found in Results Review             "

## 2023-05-15 ENCOUNTER — RA CDI HCC (OUTPATIENT)
Dept: OTHER | Facility: HOSPITAL | Age: 38
End: 2023-05-15

## 2023-05-15 NOTE — PROGRESS NOTES
[facility]  coding opportunities          Chart Reviewed number of suggestions sent to Provider: 2  E11 [id number]  E11 [id number]     Patients Insurance     Medicare Insurance: [employer]

## 2023-05-17 ENCOUNTER — TELEPHONE (OUTPATIENT)
Dept: NEPHROLOGY | Facility: CLINIC | Age: 38
End: 2023-05-17

## 2023-05-17 NOTE — TELEPHONE ENCOUNTER
Call from patients Mom stating she would like to speak with Dr Susan Boyce  Patients Mother stated that there were changes to patients Dialysis treatment and there has been communication of changes of treatment between Dr Susan Byoce and Dr Nadya Bryson from dialysis center  Mom Qamar Angeles stated that since changes to treatment has changed the patient has been having bad side effects and she was told that it will affect his standing with having a transplant  Qamar Angeles would like to follow up with Susan Boyce regarding the changes to treatment  Qamar Angeles call back number [phone]

## 2023-05-17 NOTE — TELEPHONE ENCOUNTER
Spoke to pt's mom - made her aware that Dr Byron Hull is off this week  She is ok with waiting until he is back, no need for anyone else from the team to call

## 2023-05-22 ENCOUNTER — OFFICE VISIT (OUTPATIENT)
Dept: INTERNAL MEDICINE CLINIC | Facility: CLINIC | Age: 38
End: 2023-05-22

## 2023-05-22 ENCOUNTER — OFFICE VISIT (OUTPATIENT)
Dept: PODIATRY | Facility: CLINIC | Age: 38
End: 2023-05-22

## 2023-05-22 VITALS
HEIGHT: 71 IN | OXYGEN SATURATION: 96 % | WEIGHT: 209.8 LBS | DIASTOLIC BLOOD PRESSURE: 70 MMHG | RESPIRATION RATE: 16 BRPM | HEART RATE: 86 BPM | BODY MASS INDEX: 29.37 KG/M2 | SYSTOLIC BLOOD PRESSURE: 128 MMHG | TEMPERATURE: 98.6 F

## 2023-05-22 VITALS — HEIGHT: 71 IN | WEIGHT: 210 LBS | BODY MASS INDEX: 29.4 KG/M2

## 2023-05-22 DIAGNOSIS — Z99.2 TYPE 1 DIABETES MELLITUS WITH CHRONIC KIDNEY DISEASE ON CHRONIC DIALYSIS (HCC): ICD-10-CM

## 2023-05-22 DIAGNOSIS — E10.42 DIABETIC POLYNEUROPATHY ASSOCIATED WITH TYPE 1 DIABETES MELLITUS (HCC): Primary | ICD-10-CM

## 2023-05-22 DIAGNOSIS — F33.0 MILD EPISODE OF RECURRENT MAJOR DEPRESSIVE DISORDER (HCC): ICD-10-CM

## 2023-05-22 DIAGNOSIS — E10.22 TYPE 1 DIABETES MELLITUS WITH CHRONIC KIDNEY DISEASE ON CHRONIC DIALYSIS (HCC): ICD-10-CM

## 2023-05-22 DIAGNOSIS — K21.9 GASTROESOPHAGEAL REFLUX DISEASE WITHOUT ESOPHAGITIS: Primary | ICD-10-CM

## 2023-05-22 DIAGNOSIS — K31.84 GASTROPARESIS DIABETICORUM (HCC): ICD-10-CM

## 2023-05-22 DIAGNOSIS — N18.6 END STAGE RENAL DISEASE ON DIALYSIS DUE TO TYPE 1 DIABETES MELLITUS (HCC): ICD-10-CM

## 2023-05-22 DIAGNOSIS — E10.22 END STAGE RENAL DISEASE ON DIALYSIS DUE TO TYPE 1 DIABETES MELLITUS (HCC): ICD-10-CM

## 2023-05-22 DIAGNOSIS — E10.42 DIABETIC POLYNEUROPATHY ASSOCIATED WITH TYPE 1 DIABETES MELLITUS (HCC): ICD-10-CM

## 2023-05-22 DIAGNOSIS — Z99.2 END STAGE RENAL DISEASE ON DIALYSIS DUE TO TYPE 1 DIABETES MELLITUS (HCC): ICD-10-CM

## 2023-05-22 DIAGNOSIS — R91.8 MULTIPLE PULMONARY NODULES: ICD-10-CM

## 2023-05-22 DIAGNOSIS — E11.43 GASTROPARESIS DIABETICORUM (HCC): ICD-10-CM

## 2023-05-22 DIAGNOSIS — N18.6 TYPE 1 DIABETES MELLITUS WITH CHRONIC KIDNEY DISEASE ON CHRONIC DIALYSIS (HCC): ICD-10-CM

## 2023-05-22 RX ORDER — INSULIN PMP CART,AUT,G6/7,CNTR
EACH SUBCUTANEOUS
COMMUNITY
Start: 2023-03-24

## 2023-05-22 NOTE — ASSESSMENT & PLAN NOTE
-pt has had several additional HD sessions to further reduce his dry weight which has been causing him to feel unwell and weak to the point that he is unable to eat  He has voiced his concerns regarding his weight to Nephyosvany LIPSCOMB    -will also communicate with his local nephrologist for input  Lab Results   Component Value Date    HGBA1C 7 3 (H) 02/06/2023

## 2023-05-22 NOTE — ASSESSMENT & PLAN NOTE
-DM1 with neuropathy, gastroparesis, ESRD on HD, nonobstructive CAD and h/o CVA    Lab Results   Component Value Date    HGBA1C 7 3 (H) 02/06/2023   -pt now on omnipod pump insulin with less frequent hypoglycemic events  -follow up with Endo

## 2023-05-22 NOTE — PROGRESS NOTES
Assessment/Plan:    Problem List Items Addressed This Visit        Digestive    Gastroparesis diabeticorum (HCC)    Gastroesophageal reflux disease without esophagitis - Primary     -noted gastritis and esophagitis on EGD  -has underlying gastroparesis  -reasonable to hold off on H2B at this point and restart med prn  -continue to eat small frequent meals, avoid laying down after meals and avoidance of ulcergenic foods            Endocrine    Type 1 diabetes mellitus ([facility] )     -DM1 with neuropathy, gastroparesis, ESRD on HD, nonobstructive CAD and h/o CVA  Lab Results   Component Value Date    HGBA1C 7 3 (H) 02/06/2023   -pt now on omnipod pump insulin with less frequent hypoglycemic events  -follow up with Endo         Diabetic neuropathy ([facility] )    End stage renal disease on dialysis due to type 1 diabetes mellitus ([facility] )     -pt has had several additional HD sessions to further reduce his dry weight which has been causing him to feel unwell and weak to the point that he is unable to eat  He has voiced his concerns regarding his weight to Nephro CRNP  -will also communicate with his local nephrologist for input  Lab Results   Component Value Date    HGBA1C 7 3 (H) 02/06/2023               Respiratory    Multiple pulmonary nodules     -due for surveillance CT chest  -he is a former smoker         Relevant Orders    CT chest wo contrast       Other    Mild episode of recurrent major depressive disorder ([facility] )     -at this point pt has not been seen by therapist for a year and recommend he seek out new therapist  -ok to prescribe escitalopram 20mg daily but advised to touch base with psychiatrist one last time            Subjective:      Patient ID: Alysia Goldman is a 42 y o  male  HPI  42yo male with DM1 with neuropathy, gastroparesis, ESRD on HD, pulm nodules, vertigo, MDD, ELIAS, nonobstructive CAD, renovascular HTN with h/o CVA, RACHEAL, hypothyroidism, vitamin D def, anemia here for follow up care    He is accompanied by his mother  Has new insulin pump omnipod 3/2023 that talks to his dexcom  Much less frequency of hypoglycemic episodes  He has continued famotidine bid  Had more vomiting, reflux while on PD but has switched to HD since  Has not seen a psychotherapist for a year due to scheduling  Has psychiatrist  prescribes escitalopram 20mg daily  Weight is decreased, feels too dry  He does not feel well enough to eat  Has had to additional treatments over the past few weeks  Has had three days in a row for the past two weeks    93kg    The following portions of the patient's history were reviewed and updated as appropriate: allergies, current medications, past family history, past medical history, past social history, past surgical history and problem list       Current Outpatient Medications:   •  aspirin (ECOTRIN LOW STRENGTH) 81 mg EC tablet, Take 81 mg by mouth daily, Disp: , Rfl:   •  atorvastatin (LIPITOR) 40 mg tablet, Take 1 tablet (40 mg total) by mouth every evening, Disp: 90 tablet, Rfl: 3  •  b complex vitamins capsule, Take 1 capsule by mouth daily before lunch , Disp: , Rfl:   •  B-D ULTRAFINE III SHORT PEN 31G X 8 MM MISC, USE 1 PEN NEEDLE 8 TIMES DAILY, Disp: 100 each, Rfl: 47  •  calcium acetate (PHOSLO) capsule, TAKE 3 CAPSULES BY MOUTH WITH MEALS, Disp: , Rfl:   •  cinacalcet (SENSIPAR) 60 MG tablet, Take 120 mg by mouth daily 2 tab daily , Disp: , Rfl:   •  cloNIDine (CATAPRES-TTS-3) 0 3 mg/24 hr, 1 patch once a week , Disp: , Rfl:   •  escitalopram (LEXAPRO) 20 mg tablet, Take 1 tablet (20 mg total) by mouth daily, Disp: 90 tablet, Rfl: 2  •  gabapentin (NEURONTIN) 100 mg capsule, Take 2 tablets at bedtime, Disp: 180 capsule, Rfl: 1  •  GLUCAGON EMERGENCY 1 MG injection, , Disp: , Rfl: 3  •  hydrALAZINE (APRESOLINE) 25 mg tablet, Take 1 tablet (25 mg total) by mouth 3 (three) times a day, Disp: 270 tablet, Rfl: 0  •  hydrOXYzine HCL (ATARAX) 10 mg tablet, Take 1 tablet (10 mg total) by mouth every 6 (six) hours as needed for itching or anxiety, Disp: 30 tablet, Rfl: 3  •  Insulin Disposable Pump (Omnipod 5 G6 Intro, Gen 5,) KIT, , Disp: , Rfl:   •  Insulin Disposable Pump (Omnipod 5 G6 Pod, Gen 5,) MISC, , Disp: , Rfl:   •  labetalol (NORMODYNE) 200 mg tablet, TAKE 2 TABLETS BY MOUTH THREE TIMES DAILY, Disp: 180 tablet, Rfl: 0  •  lisinopril (ZESTRIL) 40 mg tablet, Take 80 mg by mouth daily, Disp: , Rfl:   •  NIFEdipine (PROCARDIA XL) 90 mg 24 hr tablet, Take 1 tablet (90 mg total) by mouth daily, Disp: 30 tablet, Rfl: 0  •  NIFEdipine ER (ADALAT CC) 60 MG 24 hr tablet, Take 1 tablet (60 mg total) by mouth 2 (two) times a day, Disp: 180 tablet, Rfl: 0  •  ondansetron (ZOFRAN) 4 mg tablet, Take 1 tablet (4 mg total) by mouth every 8 (eight) hours as needed for nausea or vomiting, Disp: 90 tablet, Rfl: 0  •  saccharomyces boulardii (FLORASTOR) 250 mg capsule, Take 250 mg by mouth daily, Disp: , Rfl:   •  SPS 15 GM/60ML suspension, TAKE 60 ML BY MOUTH SINGLE DOSE FOR EMERGENCY USE DURING MISSED HEMODIALYSIS, Disp: , Rfl:   •  torsemide (DEMADEX) 100 mg tablet, Take 100 mg by mouth daily  , Disp: , Rfl:   •  traZODone (DESYREL) 50 mg tablet, Take 0 5 tablets (25 mg total) by mouth daily at bedtime as needed for sleep, Disp: 30 tablet, Rfl: 3  •  clotrimazole (LOTRIMIN) 1 % cream, Apply to affected area 2 times daily for 2 weeks (Patient not taking: Reported on 5/22/2023), Disp: 15 g, Rfl: 0  •  Gvoke HypoPen 1-Pack 1 MG/0 2ML SOAJ, , Disp: , Rfl:   •  insulin glargine (Basaglar KwikPen) 100 units/mL injection pen, Inject 7 Units under the skin daily at bedtime (Patient not taking: Reported on 5/22/2023), Disp: 3 mL, Rfl: 0  •  mupirocin (BACTROBAN) 2 % ointment, Apply topically 3 (three) times a day Until lesion on right hand heals   (Patient not taking: Reported on 5/22/2023), Disp: 30 g, Rfl: 4  •  NovoLOG 100 UNIT/ML injection, INJECT UP TO 50 UNITS DAILY IN INSULIN PUMP (Patient not "taking: Reported on 5/22/2023), Disp: , Rfl:   •  NovoLOG FlexPen ReliOn 100 UNIT/ML injection pen, , Disp: , Rfl:   •  ofloxacin (FLOXIN) 0 3 % otic solution, Administer 10 drops to the right ear daily (Patient not taking: Reported on 5/22/2023), Disp: 5 mL, Rfl: 0  •  Patiromer Sorbitex Calcium (VELTASSA PO), Take 5 g by mouth once a week (Patient not taking: Reported on 5/22/2023), Disp: , Rfl:   •  triamcinolone (KENALOG) 0 1 % ointment, Apply topically 2 (two) times a day For up to 14 days on the itchy areas  Avoid groin, underarms and face  It is important to take at least 1 week break between uses  (Patient not taking: Reported on 5/22/2023), Disp: 454 g, Rfl: 0    Review of Systems   Constitutional: Positive for appetite change, fatigue and unexpected weight change  Negative for activity change  Respiratory: Negative for cough, shortness of breath and wheezing  Cardiovascular: Negative for chest pain, palpitations and leg swelling  Gastrointestinal: Positive for nausea  Less reflux   Neurological: Positive for dizziness and numbness  Negative for headaches  Psychiatric/Behavioral: Positive for dysphoric mood  The patient is nervous/anxious  Objective:    /70   Pulse 86   Temp 98 6 °F (37 °C)   Resp 16   Ht 5' 11\" (1 803 m)   Wt 95 2 kg (209 lb 12 8 oz)   SpO2 96%   BMI 29 26 kg/m²      Physical Exam  Vitals reviewed  Constitutional:       General: He is not in acute distress  HENT:      Head: Normocephalic  Cardiovascular:      Rate and Rhythm: Normal rate and regular rhythm  Pulses: Normal pulses  Heart sounds: Normal heart sounds  Pulmonary:      Effort: Pulmonary effort is normal  No respiratory distress  Breath sounds: Normal breath sounds  No wheezing  Musculoskeletal:      Right lower leg: No edema  Left lower leg: No edema  Neurological:      Mental Status: He is alert and oriented to person, place, and time     Psychiatric:         " Mood and Affect: Mood normal

## 2023-05-22 NOTE — ASSESSMENT & PLAN NOTE
-at this point pt has not been seen by therapist for a year and recommend he seek out new therapist  -ok to prescribe escitalopram 20mg daily but advised to touch base with psychiatrist one last time

## 2023-05-22 NOTE — PROGRESS NOTES
Established patient with class findings presents for nail care  Patient is now using an insulin pump and states that his control was much better  Vascular exam:  DP  0/4 bilateral; PT  0 4 bilateral   Dermatological exam:  Each toenail is thick and  dystrophic  No open areas on feet  Diagnosis:  Diabetes mellitus  Treatment: Trimmed multiple dystrophic toenails      Nail removal    Date/Time: 5/22/2023 7:45 AM  Performed by: Ricard Habermann, DPM  Authorized by: Ricard Habermann, DPM     Nails trimmed:     Number of nails trimmed:  10

## 2023-05-22 NOTE — ASSESSMENT & PLAN NOTE
-noted gastritis and esophagitis on EGD  -has underlying gastroparesis  -reasonable to hold off on H2B at this point and restart med prn  -continue to eat small frequent meals, avoid laying down after meals and avoidance of ulcergenic foods

## 2023-05-24 ENCOUNTER — OFFICE VISIT (OUTPATIENT)
Dept: CARDIOLOGY CLINIC | Facility: CLINIC | Age: 38
End: 2023-05-24

## 2023-05-24 VITALS
BODY MASS INDEX: 28.84 KG/M2 | SYSTOLIC BLOOD PRESSURE: 148 MMHG | HEIGHT: 71 IN | HEART RATE: 76 BPM | WEIGHT: 206 LBS | DIASTOLIC BLOOD PRESSURE: 82 MMHG | OXYGEN SATURATION: 99 %

## 2023-05-24 DIAGNOSIS — E78.5 DYSLIPIDEMIA: ICD-10-CM

## 2023-05-24 DIAGNOSIS — N18.6 END STAGE RENAL DISEASE ON DIALYSIS DUE TO TYPE 1 DIABETES MELLITUS (HCC): Primary | ICD-10-CM

## 2023-05-24 DIAGNOSIS — E10.22 END STAGE RENAL DISEASE ON DIALYSIS DUE TO TYPE 1 DIABETES MELLITUS (HCC): Primary | ICD-10-CM

## 2023-05-24 DIAGNOSIS — Z86.73 HISTORY OF LACUNAR CEREBROVASCULAR ACCIDENT (CVA): Chronic | ICD-10-CM

## 2023-05-24 DIAGNOSIS — I27.20 PULMONARY HTN (HCC): ICD-10-CM

## 2023-05-24 DIAGNOSIS — I25.10 CORONARY ARTERY DISEASE INVOLVING NATIVE CORONARY ARTERY OF NATIVE HEART WITHOUT ANGINA PECTORIS: ICD-10-CM

## 2023-05-24 DIAGNOSIS — Z99.2 END STAGE RENAL DISEASE ON DIALYSIS DUE TO TYPE 1 DIABETES MELLITUS (HCC): Primary | ICD-10-CM

## 2023-05-24 NOTE — PROGRESS NOTES
Cardiology Follow Up    Asiya Barron  1985  [id number]  [address] [facility] [id number] [phone]    1  End stage renal disease on dialysis due to type 1 diabetes mellitus ([facility] )        2  Coronary artery disease involving native coronary artery of native heart without angina pectoris        3  Pulmonary HTN ([facility] )        4  Dyslipidemia        5  History of lacunar cerebrovascular accident (CVA)              Discussion/Summary: All of his assessed cardiac problems are stable  I have reviewed his medications and made no changes  No cardiac testing is ordered  RTO 1 year  I will schedule ILR explant  Interval History: He has not had any cardiac problems since his last OV  He says that he is more active and denies CP, SOB  He has had 2 previous strokes  An ILR did not show any AF  Cardiac cath 7/2021 - no significant CAD  ECHO 2/2022 - EF 55 - 60%, dilated LA    He now has an indulin pump and his blood sugars are much better controlled  He is on the kidney transplant list at [address]      Patient Active Problem List   Diagnosis   • Type 1 diabetes mellitus ([facility] )   • History of lacunar cerebrovascular accident (CVA)   • 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,     • Binocular visual disturbance   • Hyperphosphatemia   • Vitamin D deficiency   • Homozygous MTHFR mutation [id number]   • Anemia in chronic kidney disease, on chronic dialysis ([facility])   • Persistent proteinuria   • Ataxia   • Left atrial dilation   • Renovascular hypertension   • 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 ([facility])   • Dyslipidemia   • Strabismus   • Environmental and seasonal allergies   • Pruritus   • Gastroparesis diabeticorum ([facility])   • Multiple pulmonary nodules   • Vertigo   • Impaired mobility and ADLs   • Diabetic neuropathy ([facility])   • Provoked seizure ([facility])   • Asterixis   • Foot drop, bilateral   • Secondary hyperparathyroidism ([facility] )   • Eosinophilic leukocytosis   • End stage renal disease on dialysis due to type 1 diabetes mellitus (HCC)   • Tubulovillous adenoma of colon   • Gastroesophageal reflux disease without esophagitis   • Medical cannabis use   • Numbness of arm   • Mild episode of recurrent major depressive disorder ([facility])   • Generalized anxiety disorder   • Hypothyroidism   • Coronary artery disease involving native coronary artery of native heart without angina pectoris   • Pulmonary HTN ([facility])   • Status post placement of implantable loop recorder   • RACHEAL (obstructive sleep apnea)   • Traumatic onycholysis     Past Medical History:   Diagnosis Date   • Acute kidney injury ([facility] )    • Ambulates with cane    • Anuria    • Anxiety    • Cellulitis of right elbow 03/31/2021   • Chronic kidney disease    • Depression    • Diabetes mellitus ([place] [address] )    • Diarrhea    • Emesis 10/24/2020   • End stage renal disease ([place] [address] ) 02/11/2018    Formatting of this note might be different from the original  Last Assessment & Plan:  Secondary to DM  On nightly PD  Followed by [facility]    Patient considering transplant for kidney and pancreas through [address] [place] [address] Road of this note might be different from the original  Last Assessment & Plan:  Formatting of this note might be different from the original  Lab Results  Component Value Date   EGFR    • Esophagitis 7/21/2015   • Falls    • Gastroparesis    • GERD (gastroesophageal reflux disease)    • History of shingles 2010   • History of transfusion 02/2018    no adverse reaction   • Hyperlipidemia    • Hyperphosphatemia    • Hypertension    • Hypoglycemia 7/15/2022   • Itching    • Mastoiditis of right side 07/15/2022   • Muscle weakness     general unsteadiness   • Obesity (BMI 30 0-34 9) 09/09/2019   • Orthostatic hypotension 10/25/2020   • Peripheral polyneuropathy 11/20/2019   • PONV (postoperative nausea and vomiting) 01/26/2018   • Protein-calorie malnutrition ([place] [address] ) 2020   • Recurrent peritonitis ([facility] ) due to peritoneal dialysis catheter 2020   • Retinopathy    • Seizures ([facility] )     early  - one time   • Skin abnormality     some dime size areas where skin was scratched from itching   • Spontaneous bacterial peritonitis ([facility] ) 10/19/2020   • Squamous cell skin cancer     left temple   • Stroke (HCC)     x2 - off balance/no driving/fatigue   • Swelling of both lower extremities    • Vomiting    • Wears glasses      Social History     Socioeconomic History   • Marital status: Single     Spouse name: Not on file   • Number of children: Not on file   • Years of education: Not on file   • Highest education level: Not on file   Occupational History   • 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   Tobacco Use   • Smoking status: Former     Packs/day: 0 50     Years: 12 00     Total pack years: 6 00     Types: Cigarettes     Quit date: 2018     Years since quittin 3   • Smokeless tobacco: Never   • Tobacco comments:     quit 2018   Vaping Use   • Vaping Use: Every day   • Substances: THC, CBD   Substance and Sexual Activity   • Alcohol use: Not Currently     Comment: occassionally   • Drug use: Yes     Types: Marijuana     Comment: medical marijuana   • Sexual activity: Not on file   Other Topics Concern   • Not on file   Social History Narrative    Caffeine use    single     Social Determinants of Health     Financial Resource Strain: Low Risk  (2023)    Overall Financial Resource Strain (CARDIA)    • Difficulty of Paying Living Expenses: Not hard at all   Food Insecurity: Not on file   Transportation Needs: No Transportation Needs (2023)    PRAPARE - Transportation    • Lack of Transportation (Medical): No    • Lack of Transportation (Non-Medical):  No   Physical Activity: Not on file   Stress: Not on file   Social Connections: Not on file   Intimate Partner Violence: Not on file   Housing Stability: Not on file      Family History Problem Relation Age of Onset   • Breast cancer Mother    • Hypertension Mother    • Hyperlipidemia Father    • Hypertension Father    • Leukemia Maternal Grandmother    • Hyperlipidemia Maternal Grandfather    • Hypertension Maternal Grandfather    • Hyperlipidemia Paternal Grandmother    • Hypertension Paternal Grandmother    • Heart disease Paternal Grandfather         cardiac disorder   • Diabetes Paternal Grandfather      Past Surgical History:   Procedure Laterality Date   • CARDIAC LOOP RECORDER  05/2018   • COLONOSCOPY     • EGD     • EYE SURGERY Right    • IR AV FISTULAGRAM/GRAFTOGRAM  02/23/2021   • IR TUNNELED CENTRAL LINE PLACEMENT  02/16/2021   • IR TUNNELED DIALYSIS CATHETER PLACEMENT  11/18/2020   • IR TUNNELED DIALYSIS CATHETER REMOVAL  02/12/2021   • IR TUNNELED DIALYSIS CATHETER REMOVAL  03/11/2021   • MOHS SURGERY Left 12/14/2022    Left temple with Dr Amie Flores   • PERITONEAL [facility] N/A 08/27/2018    Procedure: UNROOF PD CATHETER;  Surgeon: Berto Turner DO;  Location: [facility];  Service: General   • FL ARTERIOVENOUS ANASTOMOSIS OPEN DIRECT Left 11/09/2020    Procedure: CREATION FISTULA  ARTERIOVENOUS (AV) - LEFT WRIST;  Surgeon: Mini Marroquin MD;  Location: [facility];  Service: Vascular   • FL ESOPHAGOGASTRODUODENOSCOPY TRANSORAL DIAGNOSTIC N/A 04/18/2019    Procedure: ESOPHAGOGASTRODUODENOSCOPY (EGD); Surgeon: Erwin Murguia MD;  Location: [facility];   Service: Gastroenterology   • FL LAPS INSERTION TUNNELED INTRAPERITONEAL CATHETER N/A 08/06/2018    Procedure: LAPAROSCOPIC PD CATHETER PLACEMENT;  Surgeon: Berto Turner DO;  Location: [facility];  Service: General   • FL REMOVAL TUNNELED INTRAPERITONEAL CATHETER N/A 11/18/2020    Procedure: REMOVAL CATHETER PERITONEAL DIALYSIS;  Surgeon: Becca Baker MD;  Location: [facility];  Service: General   • TONSILLECTOMY     • UPPER GASTROINTESTINAL ENDOSCOPY         Current Outpatient Medications:   •  aspirin ([address]) 81 mg EC tablet, Take 81 mg by mouth daily, Disp: , Rfl:   •  atorvastatin (LIPITOR) 40 mg tablet, Take 1 tablet (40 mg total) by mouth every evening, Disp: 90 tablet, Rfl: 3  •  b complex vitamins capsule, Take 1 capsule by mouth daily before lunch , Disp: , Rfl:   •  B-D ULTRAFINE III SHORT PEN 31G X 8 MM MISC, USE 1 PEN NEEDLE 8 TIMES DAILY, Disp: 100 each, Rfl: 47  •  calcium acetate (PHOSLO) capsule, TAKE 3 CAPSULES BY MOUTH WITH MEALS, Disp: , Rfl:   •  cinacalcet (SENSIPAR) 60 MG tablet, Take 120 mg by mouth daily 2 tab daily , Disp: , Rfl:   •  clotrimazole (LOTRIMIN) 1 % cream, Apply to affected area 2 times daily for 2 weeks, Disp: 15 g, Rfl: 0  •  escitalopram (LEXAPRO) 20 mg tablet, Take 1 tablet (20 mg total) by mouth daily, Disp: 90 tablet, Rfl: 2  •  gabapentin (NEURONTIN) 100 mg capsule, Take 2 tablets at bedtime, Disp: 180 capsule, Rfl: 1  •  GLUCAGON EMERGENCY 1 MG injection, , Disp: , Rfl: 3  •  hydrALAZINE (APRESOLINE) 25 mg tablet, Take 1 tablet (25 mg total) by mouth 3 (three) times a day, Disp: 270 tablet, Rfl: 0  •  hydrOXYzine HCL (ATARAX) 10 mg tablet, Take 1 tablet (10 mg total) by mouth every 6 (six) hours as needed for itching or anxiety, Disp: 30 tablet, Rfl: 3  •  Insulin Disposable Pump (Omnipod 5 G6 Intro, Gen 5,) KIT, , Disp: , Rfl:   •  Insulin Disposable Pump (Omnipod 5 G6 Pod, Gen 5,) MISC, , Disp: , Rfl:   •  labetalol (NORMODYNE) 200 mg tablet, TAKE 2 TABLETS BY MOUTH THREE TIMES DAILY, Disp: 180 tablet, Rfl: 0  •  lisinopril (ZESTRIL) 40 mg tablet, Take 80 mg by mouth daily, Disp: , Rfl:   •  NIFEdipine (PROCARDIA XL) 90 mg 24 hr tablet, Take 1 tablet (90 mg total) by mouth daily, Disp: 30 tablet, Rfl: 0  •  NIFEdipine ER (ADALAT CC) 60 MG 24 hr tablet, Take 1 tablet (60 mg total) by mouth 2 (two) times a day, Disp: 180 tablet, Rfl: 0  •  ondansetron (ZOFRAN) 4 mg tablet, Take 1 tablet (4 mg total) by mouth every 8 (eight) hours as needed for nausea or vomiting, Disp: 90 "tablet, Rfl: 0  •  saccharomyces boulardii (FLORASTOR) 250 mg capsule, Take 250 mg by mouth daily, Disp: , Rfl:   •  SPS 15 GM/60ML suspension, TAKE 60 ML BY MOUTH SINGLE DOSE FOR EMERGENCY USE DURING MISSED HEMODIALYSIS, Disp: , Rfl:   •  torsemide (DEMADEX) 100 mg tablet, Take 100 mg by mouth daily  , Disp: , Rfl:   •  traZODone (DESYREL) 50 mg tablet, Take 0 5 tablets (25 mg total) by mouth daily at bedtime as needed for sleep, Disp: 30 tablet, Rfl: 3  •  cloNIDine (CATAPRES-TTS-3) 0 3 mg/24 hr, 1 patch once a week  (Patient not taking: Reported on 5/24/2023), Disp: , Rfl:   •  Gvoke HypoPen 1-Pack 1 MG/0 2ML SOAJ, , Disp: , Rfl:   •  insulin glargine (Basaglar KwikPen) 100 units/mL injection pen, Inject 7 Units under the skin daily at bedtime (Patient not taking: Reported on 5/22/2023), Disp: 3 mL, Rfl: 0  •  mupirocin (BACTROBAN) 2 % ointment, Apply topically 3 (three) times a day Until lesion on right hand heals  (Patient not taking: Reported on 5/22/2023), Disp: 30 g, Rfl: 4  •  NovoLOG 100 UNIT/ML injection, INJECT UP TO 50 UNITS DAILY IN INSULIN PUMP (Patient not taking: Reported on 5/22/2023), Disp: , Rfl:   •  NovoLOG FlexPen ReliOn 100 UNIT/ML injection pen, , Disp: , Rfl:   •  ofloxacin (FLOXIN) 0 3 % otic solution, Administer 10 drops to the right ear daily (Patient not taking: Reported on 5/22/2023), Disp: 5 mL, Rfl: 0  •  Patiromer Sorbitex Calcium (VELTASSA PO), Take 5 g by mouth once a week (Patient not taking: Reported on 5/22/2023), Disp: , Rfl:   •  triamcinolone (KENALOG) 0 1 % ointment, Apply topically 2 (two) times a day For up to 14 days on the itchy areas  Avoid groin, underarms and face  It is important to take at least 1 week break between uses   (Patient not taking: Reported on 5/22/2023), Disp: 454 g, Rfl: 0  Allergies   Allergen Reactions   • Sulfa Antibiotics Rash     Vitals:    05/24/23 1117   BP: 148/82   Pulse: 76   SpO2: 99%   Weight: 93 4 kg (206 lb)   Height: 5' 11\" (1 803 m) " "    Weight (last 2 days)     Date/Time Weight    05/24/23 1117 93 4 (206)         Blood pressure 148/82, pulse 76, height 5' 11\" (1 803 m), weight 93 4 kg (206 lb), SpO2 99 %  , Body mass index is 28 73 kg/m²  Labs:  Admission on 01/15/2023, Discharged on 01/15/2023   Component Date Value   • WBC 01/15/2023 6 35    • RBC 01/15/2023 2 91 (L)    • Hemoglobin 01/15/2023 9 4 (L)    • Hematocrit 01/15/2023 28 5 (L)    • MCV 01/15/2023 98    • MCH 01/15/2023 32 3    • MCHC 01/15/2023 33 0    • RDW 01/15/2023 13 2    • MPV 01/15/2023 9 3    • Platelets 03/26/0420 245    • nRBC 01/15/2023 0    • Neutrophils Relative 01/15/2023 62    • Immat GRANS % 01/15/2023 1    • Lymphocytes Relative 01/15/2023 23    • Monocytes Relative 01/15/2023 6    • Eosinophils Relative 01/15/2023 7 (H)    • Basophils Relative 01/15/2023 1    • Neutrophils Absolute 01/15/2023 3 95    • Immature Grans Absolute 01/15/2023 0 03    • Lymphocytes Absolute 01/15/2023 1 48    • Monocytes Absolute 01/15/2023 0 40    • Eosinophils Absolute 01/15/2023 0 44    • Basophils Absolute 01/15/2023 0 05    • Sodium 01/15/2023 139    • Potassium 01/15/2023 4 4    • Chloride 01/15/2023 97    • CO2 01/15/2023 32    • ANION GAP 01/15/2023 10    • BUN 01/15/2023 23    • Creatinine 01/15/2023 7 48 (H)    • Glucose 01/15/2023 218 (H)    • Calcium 01/15/2023 8 1 (L)    • AST 01/15/2023 16    • ALT 01/15/2023 11    • Alkaline Phosphatase 01/15/2023 89    • Total Protein 01/15/2023 6 6    • Albumin 01/15/2023 3 9    • Total Bilirubin 01/15/2023 0 64    • eGFR 01/15/2023 8    • Ventricular Rate 01/15/2023 71    • Atrial Rate 01/15/2023 71    • ME Interval 01/15/2023 174    • QRSD Interval 01/15/2023 78    • QT Interval 01/15/2023 462    • QTC Interval 01/15/2023 502    • P Axis 01/15/2023 58    • QRS Axis 01/15/2023 54    • T Wave Axis 01/15/2023 33    • hs TnI 0hr 01/15/2023 17      Imaging: No results found      Review of Systems:  Review of Systems   Constitutional: " Negative for diaphoresis, fatigue, fever and unexpected weight change  HENT: Negative  Respiratory: Negative for cough, shortness of breath and wheezing  Cardiovascular: Negative for chest pain, palpitations and leg swelling  Gastrointestinal: Negative for abdominal pain, diarrhea and nausea  Musculoskeletal: Negative for gait problem and myalgias  Skin: Negative for rash  Neurological: Negative for dizziness and numbness  Psychiatric/Behavioral: Negative  Physical Exam:  Physical Exam  Constitutional:       Appearance: He is well-developed  HENT:      Head: Normocephalic and atraumatic  Eyes:      Pupils: Pupils are equal, round, and reactive to light  Neck:      Vascular: No JVD  Cardiovascular:      Rate and Rhythm: Regular rhythm  Pulses: Normal pulses  Carotid pulses are 2+ on the right side and 2+ on the left side  Heart sounds: S1 normal and S2 normal    Pulmonary:      Effort: Pulmonary effort is normal       Breath sounds: Normal breath sounds  No wheezing or rales  Abdominal:      General: Bowel sounds are normal       Palpations: Abdomen is soft  Tenderness: There is no abdominal tenderness  Musculoskeletal:         General: No tenderness  Normal range of motion  Cervical back: Normal range of motion and neck supple  Skin:     General: Skin is warm  Neurological:      Mental Status: He is alert and oriented to person, place, and time  Cranial Nerves: No cranial nerve deficit  Deep Tendon Reflexes: Reflexes are normal and symmetric

## 2023-05-31 ENCOUNTER — TELEPHONE (OUTPATIENT)
Dept: CARDIOLOGY CLINIC | Facility: CLINIC | Age: 38
End: 2023-05-31

## 2023-05-31 NOTE — TELEPHONE ENCOUNTER
----- Message from Raghav Vaughan MD sent at 5/24/2023 12:51 PM EDT -----  Please schedule ilr explant  ----- Message -----  From: Juanito Heart MD  Sent: 5/24/2023  12:25 PM EDT  To: Mike Goldstein MD; Raghav Vaughan MD; #    I need a favor  Patient needs ILR explant   He is listed for kidney transplant and renal is on me    Thx    Rich

## 2023-06-02 ENCOUNTER — HOSPITAL ENCOUNTER (OUTPATIENT)
Dept: CT IMAGING | Facility: HOSPITAL | Age: 38
Discharge: HOME | End: 2023-06-02

## 2023-06-02 DIAGNOSIS — R91.8 MULTIPLE PULMONARY NODULES: ICD-10-CM

## 2023-06-21 ENCOUNTER — TELEPHONE (OUTPATIENT)
Dept: INTERNAL MEDICINE CLINIC | Facility: CLINIC | Age: 38
End: 2023-06-21

## 2023-06-21 DIAGNOSIS — K21.9 GASTROESOPHAGEAL REFLUX DISEASE WITHOUT ESOPHAGITIS: Primary | ICD-10-CM

## 2023-06-21 RX ORDER — FAMOTIDINE 40 MG/1
40 TABLET, FILM COATED ORAL DAILY
Qty: 90 TABLET | Refills: 1 | Status: SHIPPED | OUTPATIENT
Start: 2023-06-21 | End: 2023-06-28 | Stop reason: SDUPTHER

## 2023-06-21 NOTE — TELEPHONE ENCOUNTER
pts mother states they tried to go off famotidine  He feels he needs to go back on it    Please send to [facility]

## 2023-06-28 DIAGNOSIS — I63.9 CEREBROVASCULAR ACCIDENT (CVA) OF LEFT PONTINE STRUCTURE (HCC): ICD-10-CM

## 2023-06-28 DIAGNOSIS — N18.30 ACUTE RENAL FAILURE WITH ACUTE TUBULAR NECROSIS SUPERIMPOSED ON STAGE 3 CHRONIC KIDNEY DISEASE (HCC): ICD-10-CM

## 2023-06-28 DIAGNOSIS — H46.9 OPTIC NEURITIS DUE TO MULTIPLE SCLEROSIS (HCC): ICD-10-CM

## 2023-06-28 DIAGNOSIS — I16.0 HYPERTENSIVE URGENCY: ICD-10-CM

## 2023-06-28 DIAGNOSIS — E72.11 HYPERHOMOCYSTEINEMIA (HCC): ICD-10-CM

## 2023-06-28 DIAGNOSIS — G35 OPTIC NEURITIS DUE TO MULTIPLE SCLEROSIS (HCC): ICD-10-CM

## 2023-06-28 DIAGNOSIS — N18.2 ACUTE RENAL FAILURE WITH ACUTE TUBULAR NECROSIS SUPERIMPOSED ON STAGE 2 CHRONIC KIDNEY DISEASE (HCC): ICD-10-CM

## 2023-06-28 DIAGNOSIS — N17.0 ACUTE RENAL FAILURE WITH ACUTE TUBULAR NECROSIS SUPERIMPOSED ON STAGE 2 CHRONIC KIDNEY DISEASE (HCC): ICD-10-CM

## 2023-06-28 DIAGNOSIS — H51.0 BINOCULAR VISION DISORDER WITH CONJUGATE GAZE PALSY: ICD-10-CM

## 2023-06-28 DIAGNOSIS — Z86.73 HISTORY OF LACUNAR CEREBROVASCULAR ACCIDENT (CVA): ICD-10-CM

## 2023-06-28 DIAGNOSIS — H53.8 BLURRED VISION: ICD-10-CM

## 2023-06-28 DIAGNOSIS — N17.0 ACUTE RENAL FAILURE WITH ACUTE TUBULAR NECROSIS SUPERIMPOSED ON STAGE 3 CHRONIC KIDNEY DISEASE (HCC): ICD-10-CM

## 2023-06-28 DIAGNOSIS — R80.1 PERSISTENT PROTEINURIA: ICD-10-CM

## 2023-06-28 DIAGNOSIS — R79.89 AZOTEMIA: ICD-10-CM

## 2023-06-28 DIAGNOSIS — H53.30 BINOCULAR VISUAL DISTURBANCE: ICD-10-CM

## 2023-06-28 DIAGNOSIS — E55.9 VITAMIN D DEFICIENCY: ICD-10-CM

## 2023-06-28 DIAGNOSIS — E10.21 TYPE 1 DIABETES MELLITUS WITH DIABETIC NEPHROPATHY, WITH LONG-TERM CURRENT USE OF INSULIN (HCC): Chronic | ICD-10-CM

## 2023-06-28 DIAGNOSIS — K21.9 GASTROESOPHAGEAL REFLUX DISEASE WITHOUT ESOPHAGITIS: ICD-10-CM

## 2023-06-28 DIAGNOSIS — R11.0 NAUSEA: ICD-10-CM

## 2023-06-28 DIAGNOSIS — E83.39 HYPERPHOSPHATEMIA: ICD-10-CM

## 2023-06-28 DIAGNOSIS — I63.9 CEREBROVASCULAR ACCIDENT (CVA), UNSPECIFIED MECHANISM (HCC): ICD-10-CM

## 2023-06-28 RX ORDER — FAMOTIDINE 40 MG/1
40 TABLET, FILM COATED ORAL DAILY
Qty: 90 TABLET | Refills: 1 | Status: SHIPPED | OUTPATIENT
Start: 2023-06-28

## 2023-06-28 RX ORDER — ATORVASTATIN CALCIUM 40 MG/1
40 TABLET, FILM COATED ORAL EVERY EVENING
Qty: 90 TABLET | Refills: 3 | Status: SHIPPED | OUTPATIENT
Start: 2023-06-28

## 2023-07-13 ENCOUNTER — TELEPHONE (OUTPATIENT)
Dept: PSYCHIATRY | Facility: CLINIC | Age: 38
End: 2023-07-13

## 2023-07-13 NOTE — TELEPHONE ENCOUNTER
Patient contacted the office to schedule a follow up visit with provider. Patient is now scheduled for 10/12/23  at 9:30a virtually.

## 2023-07-24 LAB
LEFT EYE DIABETIC RETINOPATHY: POSITIVE
RIGHT EYE DIABETIC RETINOPATHY: POSITIVE

## 2023-07-31 ENCOUNTER — OFFICE VISIT (OUTPATIENT)
Dept: PODIATRY | Facility: CLINIC | Age: 38
End: 2023-07-31
Payer: MEDICARE

## 2023-07-31 VITALS
HEIGHT: 71 IN | SYSTOLIC BLOOD PRESSURE: 159 MMHG | BODY MASS INDEX: 29.68 KG/M2 | RESPIRATION RATE: 18 BRPM | DIASTOLIC BLOOD PRESSURE: 95 MMHG | WEIGHT: 212 LBS | HEART RATE: 76 BPM

## 2023-07-31 DIAGNOSIS — E10.42 DIABETIC POLYNEUROPATHY ASSOCIATED WITH TYPE 1 DIABETES MELLITUS (HCC): Primary | ICD-10-CM

## 2023-07-31 PROCEDURE — 99213 OFFICE O/P EST LOW 20 MIN: CPT | Performed by: PODIATRIST

## 2023-07-31 NOTE — PROGRESS NOTES
Assessment/Plan:    Discussed principles of diabetic foot care. There are no palpable pedal pulses and sensorium is markedly diminished. Patient urged to refrain from walking barefoot. Toenails are dystrophic and were trimmed. He is rescheduled in 3 months for palliative care. No problem-specific Assessment & Plan notes found for this encounter. Diagnoses and all orders for this visit:    Diabetic polyneuropathy associated with type 1 diabetes mellitus ([address])          Subjective:      Patient ID: Amador Herrera is a 42 y.o. male. HPI     Patient, a 75-year-old type 1 diabetic male with known neuropathy and PVD presents for his yearly diabetic foot exam.  Patient is a dialysis patient. He has no pedal disorders bothering him today. Both feet are numb. Toenails are slightly long and dystrophic. I personally reviewed an A1c dated 2/6/2023. It was 7.3. I personally reviewed an A1c dated 7/15/2022. It was 6.7. The following portions of the patient's history were reviewed and updated as appropriate: allergies, current medications, past family history, past medical history, past social history, past surgical history and problem list.    Review of Systems   Cardiovascular:        Coronary artery disease   Gastrointestinal:        GERD   Neurological: Positive for numbness. Objective:      /95   Pulse 76   Resp 18   Ht 5' 11" (1.803 m)   Wt 96.2 kg (212 lb)   BMI 29.57 kg/m²          Physical Exam  Cardiovascular:      Pulses: Pulses are weak. Dorsalis pedis pulses are 0 on the right side and 0 on the left side. Posterior tibial pulses are 0 on the right side and 0 on the left side. Feet:      Right foot:      Skin integrity: No ulcer, skin breakdown, erythema, warmth, callus or dry skin. Left foot:      Skin integrity: No ulcer, skin breakdown, erythema, warmth, callus or dry skin.            Diabetic Foot Exam    Patient's shoes and socks removed. Right Foot/Ankle   Right Foot Inspection  Skin Exam: skin normal and skin intact. No dry skin, no warmth, no callus, no erythema, no maceration, no abnormal color, no pre-ulcer, no ulcer and no callus. Toe Exam: ROM and strength within normal limits. Sensory   Vibration: absent  Proprioception: absent  Monofilament testing: diminished    Vascular  Capillary refills: elevated  The right DP pulse is 0. The right PT pulse is 0. Right Toe  - Comprehensive Exam  Ecchymosis: none  Arch: normal  Hammertoes: absent  Claw Toes: absent  Swelling: none   Tenderness: none         Left Foot/Ankle  Left Foot Inspection  Skin Exam: skin normal and skin intact. No dry skin, no warmth, no erythema, no maceration, normal color, no pre-ulcer, no ulcer and no callus. Toe Exam: ROM and strength within normal limits. Sensory   Vibration: absent  Proprioception: absent  Monofilament testing: diminished    Vascular  Capillary refills: elevated  The left DP pulse is 0. The left PT pulse is 0.      Left Toe  - Comprehensive Exam  Ecchymosis: none  Arch: normal  Hammertoes: absent  Claw toes: absent  Swelling: none   Tenderness: none           Assign Risk Category  No deformity present  Loss of protective sensation  Weak pulses  Risk: 2

## 2023-08-16 ENCOUNTER — TELEPHONE (OUTPATIENT)
Dept: CARDIOLOGY CLINIC | Facility: CLINIC | Age: 38
End: 2023-08-16

## 2023-08-16 NOTE — TELEPHONE ENCOUNTER
Patient is having his loop recorder explanted tomorrow and is asking if he needs to hold his Heparin

## 2023-08-18 ENCOUNTER — TELEPHONE (OUTPATIENT)
Dept: CARDIOLOGY CLINIC | Facility: CLINIC | Age: 38
End: 2023-08-18

## 2023-08-18 NOTE — TELEPHONE ENCOUNTER
Patient's mother called on behalf of Sebas Pete, who is in his dialysis treatment at this time. She would like an explanation as to when the Loop explant will be. Mother Rob Jarrett said Dr Priscila Adams told them he would try to get it done ASAP as the patient is preparing for an upcoming kidney transplant at [facility] and said the loop is supposed to be removed prior to getting the kidney transplant.         Brittany Blanc phone :[phone]

## 2023-08-22 ENCOUNTER — TELEPHONE (OUTPATIENT)
Dept: CARDIOLOGY CLINIC | Facility: CLINIC | Age: 38
End: 2023-08-22

## 2023-08-22 NOTE — TELEPHONE ENCOUNTER
Good afternoon. This is Timothy Mathis calling on behalf of my son Lakeshia Trujillo, birthday 1985. This is my third message. I'm pretty frustrated. He was supposed to have his loop recorder removed August 17th. He's a patient of Doctor Amanda Pulido. We've been working on getting this put through for about a year now and finally scheduled for August 17th. But when he arrived they told him the appointment was to have a site check on an already removed loop recorder and refuse to help him in any way. So we are very frustrated. I am going to continue to call daily until somebody at least acknowledges that they have received my messages. I'm sorry to be such a pest. I'm not usually like this, but this is really frustrating. He's on the transplant list for a kidney. They would like it to come out before that happens. And he has dialysis three days a week. So we have that scheduling issue too. My number is [phone]. I would really appreciate a call from somebody. Thank you.  Bye, bye.

## 2023-08-23 ENCOUNTER — PREP FOR PROCEDURE (OUTPATIENT)
Dept: CARDIOLOGY CLINIC | Facility: CLINIC | Age: 38
End: 2023-08-23

## 2023-08-23 DIAGNOSIS — Z95.818 STATUS POST PLACEMENT OF IMPLANTABLE LOOP RECORDER: Primary | ICD-10-CM

## 2023-08-23 NOTE — TELEPHONE ENCOUNTER
Patient scheduled for loop explant at [facility] on 9/21/2023 with Dr Lisa Dunaway. Patient father aware of general instructions. Patient father mentioned, patient express that he is NOT able to palpable the device. He is using Insulin Disposable Pump ,Omnipod. Dr Lisa Dunaway can you please advise if patient will need to stump the insulin pump in the case that procedure will be perfomr under sedation? Patient does Dialysis treatment Monday's, Wednesday's and Friday's at [facility]. Patient cover under medicare.

## 2023-09-05 ENCOUNTER — TELEPHONE (OUTPATIENT)
Age: 38
End: 2023-09-05

## 2023-09-05 NOTE — TELEPHONE ENCOUNTER
Caller: Paulo     Doctor: Dave Resendiz     Reason for call: Patient is a diabetic said his right pinky toe is sore please call and advise.  He would be willing to go to [facility] to be seen  Thank you     Call back#:[phone]

## 2023-09-06 ENCOUNTER — OFFICE VISIT (OUTPATIENT)
Dept: PODIATRY | Facility: CLINIC | Age: 38
End: 2023-09-06
Payer: MEDICARE

## 2023-09-06 VITALS
SYSTOLIC BLOOD PRESSURE: 152 MMHG | HEART RATE: 84 BPM | RESPIRATION RATE: 18 BRPM | DIASTOLIC BLOOD PRESSURE: 80 MMHG | WEIGHT: 210 LBS | HEIGHT: 71 IN | BODY MASS INDEX: 29.4 KG/M2

## 2023-09-06 DIAGNOSIS — B35.1 ONYCHOMYCOSIS: ICD-10-CM

## 2023-09-06 DIAGNOSIS — E10.42 DIABETIC POLYNEUROPATHY ASSOCIATED WITH TYPE 1 DIABETES MELLITUS (HCC): Primary | ICD-10-CM

## 2023-09-06 DIAGNOSIS — L60.1 ONYCHOLYSIS: ICD-10-CM

## 2023-09-06 PROCEDURE — 99212 OFFICE O/P EST SF 10 MIN: CPT | Performed by: PODIATRIST

## 2023-09-06 NOTE — PROGRESS NOTES
Patient, a 18-year-old male with known PVD, neuropathy and end-stage renal disease presents with concern regarding his right fifth toe. Recently the toe became painful. Although patient is primarily numb in his feet he can feel discomfort. On exam, all toenails are thick and yellow secondary to onychomycosis. The right fifth toenail is loose likely due to the patient catching in his sock. No evidence of paronychia or infection. Treatment consisted of removing the majority of the right fifth toenail. Bacitracin dressing applied as there was bleeding at the nailbed. This should heal over the next few days. Patient advised to continue with Neosporin for the next 3 to 5 days. He will keep his appointment in October.

## 2023-09-07 NOTE — H&P (VIEW-ONLY)
Electrophysiology Follow Up  Heart & Vascular [address] [facility]  BRITTNEY Ornelas [place]    Name: Cam Gibson  : 1985  MRN: [id number]    ASSESSMENT/PLAN:  1. Medtronic loop recorder at [facility] since 1/3/2022  -- implanted 2018 by Dr. Staci Becker for cryptogenic CVA  -- no Afib found on remote transmissions  -- explant prior to renal transplant at [facility], scheduled 2023 with Dr. Kwadwo Jovel  -- loop in horizontal position, 1 inch medial to areola and 1-2 inches lateral to sternum  2. CVA x 2, heterozygous prothrombin mutation  3. ESRD on HD q MWF  -- LUE AV fistula  -- renal transplant workup at [facility]  4. DM1 since age 1, insulin pump  5. HLD  -- no significant CAD on cath 2021  -- follows with Dr. Usha Prieto  6. Pulmonary HTN    Bill presents for evaluation prior to loop recorder explant. The loop recorder reached [facility] in 2022 and is now at end of service. This must be removed prior to undergoing renal transplant at [facility]. The loop recorder was palpated 1 inch medial to the left areola and 1-2 inches lateral to the sternum in a horizontal position. He is scheduled for loop explant on 2023 with anesthesia with Dr. Kwadwo Jovel. He will be NPO after midnight prior to the procedure. He will hold the insulin pump that night. He will follow up for a wound check 2 weeks post procedure and may follow up prn after that. He will continue to follow with Dr. Usha Prieto. CC/HPI:    Cam Gibson is a 34 y.o. male with h/o DM1 since age 1 now with insulin pump, ESRD on HD via AVF LUE awaiting renal transplant at [facility] [place], gastroparesis, visual problems, neuropathic complications, HLD, pulmonary HTN, h/o CVA x 2 and heterozygous prothrombin mutation, and Medtronic loop recorder at [facility] who presents for evaluation for loop explant.      He underwent implantation of a Medtronic loop recorder by Dr. Staci Becker on 2018 due to cryptogenic CVA. He has not had any Afib on remote transmissions. His device reached [facility] on 1/3/2022 but has not yet been explanted. He is on transplant list at [facility] and it was requested this be removed prior to transplant. He follows with Dr. Cindi Castano. He has been stable on current medical regimen. Cardiac cath on July 2021 showed no significant CAD. Echocardiogram in February 2022 documented an EF of 55-60%. Today, he is feeling well. He had an appointment at [facility] yesterday. He has dialysis q Monday Wednesday Friday but soon will be doing home dialysis. He denies any problems at the loop recorder site. He has not had any stroke symptoms or palpitations. He denies chest pain or shortness of breath. EKG: normal sinus rhythm HR 72 bpm    Review of Systems   Constitutional: Negative. HENT: Negative. Eyes: Negative. Respiratory: Negative for chest tightness, shortness of breath and wheezing. Cardiovascular: Negative for chest pain and palpitations. Gastrointestinal: Negative for abdominal pain, nausea and vomiting. Endocrine: Negative. Genitourinary: Negative. Musculoskeletal: Negative for arthralgias. Skin: Negative for rash. Neurological: Negative for dizziness, syncope and weakness. OBJECTIVE:  Vitals:   /80 (BP Location: Right arm, Patient Position: Sitting, Cuff Size: Standard)   Pulse 72   Ht 5' 11" (1.803 m)   Wt 94.8 kg (209 lb)   BMI 29.15 kg/m²   Body mass index is 29.15 kg/m². Physical Exam:  GEN: NAD, alert and oriented x 3, well appearing  SKIN: warm, dry without significant lesions or rashes. Left chest loop implant site well healed, no signs of infection. Loop located 1 inch medial to areola and 1-2 inches lateral to sternum.    HEENT: NCAT  NECK: supple  EXTREMITIES/VASCULAR: no edema  PSYCH: Normal mood and affect    Medications:      Current Outpatient Medications:   •  aspirin (ECOTRIN LOW STRENGTH) 81 mg EC tablet, Take 81 mg by mouth daily, Disp: , Rfl:   •  atorvastatin (LIPITOR) 40 mg tablet, Take 1 tablet (40 mg total) by mouth every evening, Disp: 90 tablet, Rfl: 3  •  b complex vitamins capsule, Take 1 capsule by mouth daily before lunch , Disp: , Rfl:   •  B-D ULTRAFINE III SHORT PEN 31G X 8 MM MISC, USE 1 PEN NEEDLE 8 TIMES DAILY, Disp: 100 each, Rfl: 47  •  calcium acetate (PHOSLO) capsule, TAKE 3 CAPSULES BY MOUTH WITH MEALS, Disp: , Rfl:   •  cinacalcet (SENSIPAR) 60 MG tablet, Take 120 mg by mouth daily 2 tab daily , Disp: , Rfl:   •  cloNIDine (CATAPRES-TTS-3) 0.3 mg/24 hr, 1 patch once a week, Disp: , Rfl:   •  escitalopram (LEXAPRO) 20 mg tablet, Take 1 tablet (20 mg total) by mouth daily, Disp: 90 tablet, Rfl: 2  •  famotidine (PEPCID) 40 MG tablet, Take 1 tablet (40 mg total) by mouth in the morning, Disp: 90 tablet, Rfl: 1  •  gabapentin (NEURONTIN) 100 mg capsule, TAKE 2 CAPSULES BY MOUTH AT BEDTIME, Disp: 180 capsule, Rfl: 1  •  GLUCAGON EMERGENCY 1 MG injection, , Disp: , Rfl: 3  •  Gvoke HypoPen 1-Pack 1 MG/0.2ML SOAJ, , Disp: , Rfl:   •  hydrALAZINE (APRESOLINE) 25 mg tablet, Take 1 tablet (25 mg total) by mouth 3 (three) times a day, Disp: 270 tablet, Rfl: 0  •  hydrOXYzine HCL (ATARAX) 10 mg tablet, Take 1 tablet (10 mg total) by mouth every 6 (six) hours as needed for itching or anxiety, Disp: 30 tablet, Rfl: 3  •  Insulin Disposable Pump (Omnipod 5 G6 Intro, Gen 5,) KIT, , Disp: , Rfl:   •  Insulin Disposable Pump (Omnipod 5 G6 Pod, Gen 5,) MISC, , Disp: , Rfl:   •  labetalol (NORMODYNE) 200 mg tablet, TAKE 2 TABLETS BY MOUTH THREE TIMES DAILY, Disp: 180 tablet, Rfl: 0  •  lisinopril (ZESTRIL) 40 mg tablet, Take 80 mg by mouth daily, Disp: , Rfl:   •  NIFEdipine (PROCARDIA XL) 90 mg 24 hr tablet, Take 1 tablet (90 mg total) by mouth daily, Disp: 30 tablet, Rfl: 0  •  NIFEdipine ER (ADALAT CC) 60 MG 24 hr tablet, Take 1 tablet (60 mg total) by mouth 2 (two) times a day, Disp: 180 tablet, Rfl: 0  •  ondansetron (ZOFRAN) 4 mg tablet, Take 1 tablet (4 mg total) by mouth every 8 (eight) hours as needed for nausea or vomiting, Disp: 90 tablet, Rfl: 0  •  Patiromer Sorbitex Calcium (VELTASSA PO), Take 5 g by mouth once a week, Disp: , Rfl:   •  saccharomyces boulardii (FLORASTOR) 250 mg capsule, Take 250 mg by mouth daily, Disp: , Rfl:   •  SPS 15 GM/60ML suspension, TAKE 60 ML BY MOUTH SINGLE DOSE FOR EMERGENCY USE DURING MISSED HEMODIALYSIS, Disp: , Rfl:   •  traZODone (DESYREL) 50 mg tablet, Take 0.5 tablets (25 mg total) by mouth daily at bedtime as needed for sleep, Disp: 30 tablet, Rfl: 3  •  clotrimazole (LOTRIMIN) 1 % cream, Apply to affected area 2 times daily for 2 weeks (Patient not taking: Reported on 9/8/2023), Disp: 15 g, Rfl: 0  •  insulin glargine (Basaglar KwikPen) 100 units/mL injection pen, Inject 7 Units under the skin daily at bedtime (Patient not taking: Reported on 9/8/2023), Disp: 3 mL, Rfl: 0  •  mupirocin (BACTROBAN) 2 % ointment, Apply topically 3 (three) times a day Until lesion on right hand heals. (Patient not taking: Reported on 5/22/2023), Disp: 30 g, Rfl: 4  •  NovoLOG 100 UNIT/ML injection, INJECT UP TO 50 UNITS DAILY IN INSULIN PUMP (Patient not taking: Reported on 5/22/2023), Disp: , Rfl:   •  NovoLOG FlexPen ReliOn 100 UNIT/ML injection pen, , Disp: , Rfl:   •  ofloxacin (FLOXIN) 0.3 % otic solution, Administer 10 drops to the right ear daily (Patient not taking: Reported on 5/22/2023), Disp: 5 mL, Rfl: 0  •  torsemide (DEMADEX) 100 mg tablet, Take 100 mg by mouth daily   (Patient not taking: Reported on 9/8/2023), Disp: , Rfl:   •  triamcinolone (KENALOG) 0.1 % ointment, Apply topically 2 (two) times a day For up to 14 days on the itchy areas. Avoid groin, underarms and face. It is important to take at least 1 week break between uses.  (Patient not taking: Reported on 5/22/2023), Disp: 454 g, Rfl: 0       Historical Information   Past Medical History:   Diagnosis Date   • Acute kidney injury ([address])    • Ambulates with cane    • Anuria    • Anxiety    • Cellulitis of right elbow 03/31/2021   • Chronic kidney disease    • Depression    • Diabetes mellitus ([address])    • Diarrhea    • Emesis 10/24/2020   • End stage renal disease ([address]) 02/11/2018    Formatting of this note might be different from the original. Last Assessment & Plan:  Secondary to DM. On nightly PD. Followed by [facility].   Patient considering transplant for kidney and pancreas through [address] of this note might be different from the original. Last Assessment & Plan:  Formatting of this note might be different from the original. Lab Results  Component Value Date   EGFR    • Esophagitis 7/21/2015   • Falls    • Gastroparesis    • GERD (gastroesophageal reflux disease)    • History of shingles 2010   • History of transfusion 02/2018    no adverse reaction   • Hyperlipidemia    • Hyperphosphatemia    • Hypertension    • Hypoglycemia 7/15/2022   • Itching    • Mastoiditis of right side 07/15/2022   • Muscle weakness     general unsteadiness   • Obesity (BMI 30.0-34.9) 09/09/2019   • Orthostatic hypotension 10/25/2020   • Peripheral polyneuropathy 11/20/2019   • PONV (postoperative nausea and vomiting) 01/26/2018   • Protein-calorie malnutrition ([address]) 11/23/2020   • Recurrent peritonitis ([address]) due to peritoneal dialysis catheter 07/31/2020   • Retinopathy    • Seizures ([address])     early 2020 - one time   • Skin abnormality     some dime size areas where skin was scratched from itching   • Spontaneous bacterial peritonitis ([address]) 10/19/2020   • Squamous cell skin cancer     left temple   • Stroke ([address])     x2 - off balance/no driving/fatigue   • Swelling of both lower extremities    • Vomiting    • Wears glasses        Past Surgical History:   Procedure Laterality Date   • CARDIAC LOOP RECORDER  05/2018   • COLONOSCOPY     • EGD     • EYE SURGERY Right    • IR AV FISTULAGRAM/GRAFTOGRAM  02/23/2021   • IR TUNNELED CENTRAL LINE PLACEMENT  02/16/2021   • IR TUNNELED DIALYSIS CATHETER PLACEMENT  2020   • IR TUNNELED DIALYSIS CATHETER REMOVAL  2021   • IR TUNNELED DIALYSIS CATHETER REMOVAL  2021   • MOHS SURGERY Left 2022    Left temple with Dr. Constanza Huntley   • [address] N/A 2018    Procedure: UNROOF PD CATHETER;  Surgeon: David Guerra DO;  Location: [facility];  Service: General   • DE ARTERIOVENOUS ANASTOMOSIS OPEN DIRECT Left 2020    Procedure: CREATION FISTULA  ARTERIOVENOUS (AV) - LEFT WRIST;  Surgeon: Elva Meigs, MD;  Location: [facility];  Service: Vascular   • DE ESOPHAGOGASTRODUODENOSCOPY TRANSORAL DIAGNOSTIC N/A 2019    Procedure: ESOPHAGOGASTRODUODENOSCOPY (EGD); Surgeon: Anabel Alston MD;  Location: [facility];   Service: Gastroenterology   • DE LAPS INSERTION TUNNELED INTRAPERITONEAL CATHETER N/A 2018    Procedure: LAPAROSCOPIC PD CATHETER PLACEMENT;  Surgeon: David Guerra DO;  [facility];  Service: General   • DE REMOVAL TUNNELED INTRAPERITONEAL CATHETER N/A 2020    Procedure: REMOVAL CATHETER PERITONEAL DIALYSIS;  Surgeon: Mony Villalobos MD;  Location: [facility];  Service: General   • TONSILLECTOMY     • UPPER GASTROINTESTINAL ENDOSCOPY         Social History     Substance and Sexual Activity   Alcohol Use Not Currently    Comment: occassionally     Social History     Substance and Sexual Activity   Drug Use Yes   • Types: Marijuana    Comment: medical marijuana     Social History     Tobacco Use   Smoking Status Former   • Packs/day: 0.50   • Years: 12.00   • Total pack years: 6.00   • Types: Cigarettes   • Quit date: 2018   • Years since quittin.6   Smokeless Tobacco Never   Tobacco Comments    quit 2018       Family History   Problem Relation Age of Onset   • Breast cancer Mother    • Hypertension Mother    • Hyperlipidemia Father    • Hypertension Father    • Leukemia Maternal Grandmother    • Hyperlipidemia Maternal Grandfather    • Hypertension Maternal Grandfather    • Hyperlipidemia Paternal Grandmother    • Hypertension Paternal Grandmother    • Heart disease Paternal Grandfather         cardiac disorder   • Diabetes Paternal Grandfather        Labs & Results:  Below is the patient's most recent value for Albumin, ALT, AST, BUN, Calcium, Chloride, Cholesterol, CO2, Creatinine, GFR, Glucose, HDL, Hematocrit, Hemoglobin, Hemoglobin A1C, LDL, Magnesium, Phosphorus, Platelets, Potassium, PSA, Sodium, Triglycerides, and WBC. Lab Results   Component Value Date    ALT 11 01/15/2023    AST 16 01/15/2023    BUN 23 01/15/2023    CALCIUM 8.1 (L) 01/15/2023    CL 97 01/15/2023    CHOL 137 10/27/2015    CO2 32 01/15/2023    CREATININE 7.48 (H) 01/15/2023    HDL 38 (L) 02/21/2020    HCT 28.5 (L) 01/15/2023    HGB 9.4 (L) 01/15/2023    HGBA1C 7.3 (H) 02/06/2023    MG 2.6 08/02/2021    PHOS 4.8 (H) 12/15/2020     01/15/2023    K 4.4 01/15/2023     10/28/2015    TRIG 93 02/21/2020    WBC 6.35 01/15/2023     Note: for a comprehensive list of the patient's lab results, access the Results Review activity. CARDIAC TESTING:    Cardiac cath 7/2021 - no significant CAD.     ECHO 2/2022 - EF 55 - 60%, dilated LA

## 2023-09-07 NOTE — PROGRESS NOTES
Electrophysiology Follow Up  Heart & Vascular [address] [facility] Emile Knapp    Name: Gui Pollard  : 1985  MRN: [id number]    ASSESSMENT/PLAN:  1. Medtronic loop recorder at [facility] since 1/3/2022  -- implanted 2018 by Dr. Mejia Kirkland for cryptogenic CVA  -- no Afib found on remote transmissions  -- explant prior to renal transplant at [facility], scheduled 2023 with Dr. Gordon Boss  -- loop in horizontal position, 1 inch medial to areola and 1-2 inches lateral to sternum  2. CVA x 2, heterozygous prothrombin mutation  3. ESRD on HD q MWF  -- LUE AV fistula  -- renal transplant workup at [facility]  4. DM1 since age 1, insulin pump  5. HLD  -- no significant CAD on cath 2021  -- follows with Dr. Michele Anderson  6. Pulmonary HTN    Bill presents for evaluation prior to loop recorder explant. The loop recorder reached [facility] in 2022 and is now at end of service. This must be removed prior to undergoing renal transplant at [facility]. The loop recorder was palpated 1 inch medial to the left areola and 1-2 inches lateral to the sternum in a horizontal position. He is scheduled for loop explant on 2023 with anesthesia with Dr. Gordon Boss. He will be NPO after midnight prior to the procedure. He will hold the insulin pump that night. He will follow up for a wound check 2 weeks post procedure and may follow up prn after that. He will continue to follow with Dr. Michele Anderson. CC/HPI:    Gui Pollard is a 34 y.o. male with h/o DM1 since age 1 now with insulin pump, ESRD on HD via AVF LUE awaiting renal transplant at [facility], gastroparesis, visual problems, neuropathic complications, HLD, pulmonary HTN, h/o CVA x 2 and heterozygous prothrombin mutation, and Medtronic loop recorder [place] [facility] who presents for evaluation for loop explant.      He underwent implantation of a Medtronic loop recorder by Dr. Mejia Kirkland on 2018 due to cryptogenic CVA. He has not had any Afib on remote transmissions. His device reached [facility] on 1/3/2022 but has not yet been explanted. He is on transplant list at [facility] and it was requested this be removed prior to transplant. He follows with Dr. Usama Carlson. He has been stable on current medical regimen. Cardiac cath on July 2021 showed no significant CAD. Echocardiogram in February 2022 documented an EF of 55-60%. Today, he is feeling well. He had an appointment at [facility] yesterday. He has dialysis q Monday Wednesday Friday but soon will be doing home dialysis. He denies any problems at the loop recorder site. He has not had any stroke symptoms or palpitations. He denies chest pain or shortness of breath. EKG: normal sinus rhythm HR 72 bpm    Review of Systems   Constitutional: Negative. HENT: Negative. Eyes: Negative. Respiratory: Negative for chest tightness, shortness of breath and wheezing. Cardiovascular: Negative for chest pain and palpitations. Gastrointestinal: Negative for abdominal pain, nausea and vomiting. Endocrine: Negative. Genitourinary: Negative. Musculoskeletal: Negative for arthralgias. Skin: Negative for rash. Neurological: Negative for dizziness, syncope and weakness. OBJECTIVE:  Vitals:   /80 (BP Location: Right arm, Patient Position: Sitting, Cuff Size: Standard)   Pulse 72   Ht 5' 11" (1.803 m)   Wt 94.8 kg (209 lb)   BMI 29.15 kg/m²   Body mass index is 29.15 kg/m². Physical Exam:  GEN: NAD, alert and oriented x 3, well appearing  SKIN: warm, dry without significant lesions or rashes. Left chest loop implant site well healed, no signs of infection. Loop located 1 inch medial to areola and 1-2 inches lateral to sternum.    HEENT: NCAT  NECK: supple  EXTREMITIES/VASCULAR: no edema  PSYCH: Normal mood and affect    Medications:      Current Outpatient Medications:   •  aspirin (ECOTRIN LOW STRENGTH) 81 mg EC tablet, Take 81 mg by mouth daily, Disp: , Rfl:   •  atorvastatin (LIPITOR) 40 mg tablet, Take 1 tablet (40 mg total) by mouth every evening, Disp: 90 tablet, Rfl: 3  •  b complex vitamins capsule, Take 1 capsule by mouth daily before lunch , Disp: , Rfl:   •  B-D ULTRAFINE III SHORT PEN 31G X 8 MM MISC, USE 1 PEN NEEDLE 8 TIMES DAILY, Disp: 100 each, Rfl: 47  •  calcium acetate (PHOSLO) capsule, TAKE 3 CAPSULES BY MOUTH WITH MEALS, Disp: , Rfl:   •  cinacalcet (SENSIPAR) 60 MG tablet, Take 120 mg by mouth daily 2 tab daily , Disp: , Rfl:   •  cloNIDine (CATAPRES-TTS-3) 0.3 mg/24 hr, 1 patch once a week, Disp: , Rfl:   •  escitalopram (LEXAPRO) 20 mg tablet, Take 1 tablet (20 mg total) by mouth daily, Disp: 90 tablet, Rfl: 2  •  famotidine (PEPCID) 40 MG tablet, Take 1 tablet (40 mg total) by mouth in the morning, Disp: 90 tablet, Rfl: 1  •  gabapentin (NEURONTIN) 100 mg capsule, TAKE 2 CAPSULES BY MOUTH AT BEDTIME, Disp: 180 capsule, Rfl: 1  •  GLUCAGON EMERGENCY 1 MG injection, , Disp: , Rfl: 3  •  Gvoke HypoPen 1-Pack 1 MG/0.2ML SOAJ, , Disp: , Rfl:   •  hydrALAZINE (APRESOLINE) 25 mg tablet, Take 1 tablet (25 mg total) by mouth 3 (three) times a day, Disp: 270 tablet, Rfl: 0  •  hydrOXYzine HCL (ATARAX) 10 mg tablet, Take 1 tablet (10 mg total) by mouth every 6 (six) hours as needed for itching or anxiety, Disp: 30 tablet, Rfl: 3  •  Insulin Disposable Pump (Omnipod 5 G6 Intro, Gen 5,) KIT, , Disp: , Rfl:   •  Insulin Disposable Pump (Omnipod 5 G6 Pod, Gen 5,) MISC, , Disp: , Rfl:   •  labetalol (NORMODYNE) 200 mg tablet, TAKE 2 TABLETS BY MOUTH THREE TIMES DAILY, Disp: 180 tablet, Rfl: 0  •  lisinopril (ZESTRIL) 40 mg tablet, Take 80 mg by mouth daily, Disp: , Rfl:   •  NIFEdipine (PROCARDIA XL) 90 mg 24 hr tablet, Take 1 tablet (90 mg total) by mouth daily, Disp: 30 tablet, Rfl: 0  •  NIFEdipine ER (ADALAT CC) 60 MG 24 hr tablet, Take 1 tablet (60 mg total) by mouth 2 (two) times a day, Disp: 180 tablet, Rfl: 0  •  ondansetron (ZOFRAN) 4 mg tablet, Take 1 tablet (4 mg total) by mouth every 8 (eight) hours as needed for nausea or vomiting, Disp: 90 tablet, Rfl: 0  •  Patiromer Sorbitex Calcium (VELTASSA PO), Take 5 g by mouth once a week, Disp: , Rfl:   •  saccharomyces boulardii (FLORASTOR) 250 mg capsule, Take 250 mg by mouth daily, Disp: , Rfl:   •  SPS 15 GM/60ML suspension, TAKE 60 ML BY MOUTH SINGLE DOSE FOR EMERGENCY USE DURING MISSED HEMODIALYSIS, Disp: , Rfl:   •  traZODone (DESYREL) 50 mg tablet, Take 0.5 tablets (25 mg total) by mouth daily at bedtime as needed for sleep, Disp: 30 tablet, Rfl: 3  •  clotrimazole (LOTRIMIN) 1 % cream, Apply to affected area 2 times daily for 2 weeks (Patient not taking: Reported on 9/8/2023), Disp: 15 g, Rfl: 0  •  insulin glargine (Basaglar KwikPen) 100 units/mL injection pen, Inject 7 Units under the skin daily at bedtime (Patient not taking: Reported on 9/8/2023), Disp: 3 mL, Rfl: 0  •  mupirocin (BACTROBAN) 2 % ointment, Apply topically 3 (three) times a day Until lesion on right hand heals. (Patient not taking: Reported on 5/22/2023), Disp: 30 g, Rfl: 4  •  NovoLOG 100 UNIT/ML injection, INJECT UP TO 50 UNITS DAILY IN INSULIN PUMP (Patient not taking: Reported on 5/22/2023), Disp: , Rfl:   •  NovoLOG FlexPen ReliOn 100 UNIT/ML injection pen, , Disp: , Rfl:   •  ofloxacin (FLOXIN) 0.3 % otic solution, Administer 10 drops to the right ear daily (Patient not taking: Reported on 5/22/2023), Disp: 5 mL, Rfl: 0  •  torsemide (DEMADEX) 100 mg tablet, Take 100 mg by mouth daily   (Patient not taking: Reported on 9/8/2023), Disp: , Rfl:   •  triamcinolone (KENALOG) 0.1 % ointment, Apply topically 2 (two) times a day For up to 14 days on the itchy areas. Avoid groin, underarms and face. It is important to take at least 1 week break between uses.  (Patient not taking: Reported on 5/22/2023), Disp: 454 g, Rfl: 0       Historical Information   Past Medical History:   Diagnosis Date   • Acute kidney injury ([address])    • Ambulates with cane    • Anuria    • Anxiety    • Cellulitis of right elbow 03/31/2021   • Chronic kidney disease    • Depression    • Diabetes mellitus ([address])    • Diarrhea    • Emesis 10/24/2020   • End stage renal disease ([address]) 02/11/2018    Formatting of this note might be different from the original. Last Assessment & Plan:  Secondary to DM. On nightly PD. Followed by Nephro.   Patient considering transplant for kidney and pancreas through [address] of this note might be different from the original. Last Assessment & Plan:  Formatting of this note might be different from the original. Lab Results  Component Value Date   EGFR    • Esophagitis 7/21/2015   • Falls    • Gastroparesis    • GERD (gastroesophageal reflux disease)    • History of shingles 2010   • History of transfusion 02/2018    no adverse reaction   • Hyperlipidemia    • Hyperphosphatemia    • Hypertension    • Hypoglycemia 7/15/2022   • Itching    • Mastoiditis of right side 07/15/2022   • Muscle weakness     general unsteadiness   • Obesity (BMI 30.0-34.9) 09/09/2019   • Orthostatic hypotension 10/25/2020   • Peripheral polyneuropathy 11/20/2019   • PONV (postoperative nausea and vomiting) 01/26/2018   • Protein-calorie malnutrition ([address]) 11/23/2020   • Recurrent peritonitis ([address]) due to peritoneal dialysis catheter 07/31/2020   • Retinopathy    • Seizures ([address])     early 2020 - one time   • Skin abnormality     some dime size areas where skin was scratched from itching   • Spontaneous bacterial peritonitis ([address]) 10/19/2020   • Squamous cell skin cancer     left temple   • Stroke ([address])     x2 - off balance/no driving/fatigue   • Swelling of both lower extremities    • Vomiting    • Wears glasses        Past Surgical History:   Procedure Laterality Date   • CARDIAC LOOP RECORDER  05/2018   • COLONOSCOPY     • EGD     • EYE SURGERY Right    • IR AV FISTULAGRAM/GRAFTOGRAM  02/23/2021   • IR TUNNELED CENTRAL LINE PLACEMENT  02/16/2021   • IR TUNNELED DIALYSIS CATHETER PLACEMENT  2020   • IR TUNNELED DIALYSIS CATHETER REMOVAL  2021   • IR TUNNELED DIALYSIS CATHETER REMOVAL  2021   • MOHS SURGERY Left 2022    Left temple with Dr. Pat Grubbs   • [address] N/A 2018    Procedure: UNROOF PD CATHETER;  Surgeon: Louis Peter DO;  Location: [facility];  Service: General   • AR ARTERIOVENOUS ANASTOMOSIS OPEN DIRECT Left 2020    Procedure: CREATION FISTULA  ARTERIOVENOUS (AV) - LEFT WRIST;  Surgeon: Mary Carrero MD;  Location: [facility];  Service: Vascular   • AR ESOPHAGOGASTRODUODENOSCOPY TRANSORAL DIAGNOSTIC N/A 2019    Procedure: ESOPHAGOGASTRODUODENOSCOPY (EGD); Surgeon: Bisi Waters MD;  Location: [facility];   Service: Gastroenterology   • AR LAPS INSERTION TUNNELED INTRAPERITONEAL CATHETER N/A 2018    Procedure: LAPAROSCOPIC PD CATHETER PLACEMENT;  Surgeon: Louis Peter DO;  [facility]: [facility];  Service: General   • AR REMOVAL TUNNELED INTRAPERITONEAL CATHETER N/A 2020    Procedure: REMOVAL CATHETER PERITONEAL DIALYSIS;  Surgeon: Juan J Amin MD;  [facility];  Service: General   • TONSILLECTOMY     • UPPER GASTROINTESTINAL ENDOSCOPY         Social History     Substance and Sexual Activity   Alcohol Use Not Currently    Comment: occassionally     Social History     Substance and Sexual Activity   Drug Use Yes   • Types: Marijuana    Comment: medical marijuana     Social History     Tobacco Use   Smoking Status Former   • Packs/day: 0.50   • Years: 12.00   • Total pack years: 6.00   • Types: Cigarettes   • Quit date: 2018   • Years since quittin.6   Smokeless Tobacco Never   Tobacco Comments    quit 2018       Family History   Problem Relation Age of Onset   • Breast cancer Mother    • Hypertension Mother    • Hyperlipidemia Father    • Hypertension Father    • Leukemia Maternal Grandmother    • Hyperlipidemia Maternal Grandfather    • Hypertension Maternal Grandfather    • Hyperlipidemia Paternal Grandmother    • Hypertension Paternal Grandmother    • Heart disease Paternal Grandfather         cardiac disorder   • Diabetes Paternal Grandfather        Labs & Results:  Below is the patient's most recent value for Albumin, ALT, AST, BUN, Calcium, Chloride, Cholesterol, CO2, Creatinine, GFR, Glucose, HDL, Hematocrit, Hemoglobin, Hemoglobin A1C, LDL, Magnesium, Phosphorus, Platelets, Potassium, PSA, Sodium, Triglycerides, and WBC. Lab Results   Component Value Date    ALT 11 01/15/2023    AST 16 01/15/2023    BUN 23 01/15/2023    CALCIUM 8.1 (L) 01/15/2023    CL 97 01/15/2023    CHOL 137 10/27/2015    CO2 32 01/15/2023    CREATININE 7.48 (H) 01/15/2023    HDL 38 (L) 02/21/2020    HCT 28.5 (L) 01/15/2023    HGB 9.4 (L) 01/15/2023    HGBA1C 7.3 (H) 02/06/2023    MG 2.6 08/02/2021    PHOS 4.8 (H) 12/15/2020     01/15/2023    K 4.4 01/15/2023     10/28/2015    TRIG 93 02/21/2020    WBC 6.35 01/15/2023     Note: for a comprehensive list of the patient's lab results, access the Results Review activity. CARDIAC TESTING:    Cardiac cath 7/2021 - no significant CAD.     ECHO 2/2022 - EF 55 - 60%, dilated LA

## 2023-09-08 ENCOUNTER — OFFICE VISIT (OUTPATIENT)
Dept: CARDIOLOGY CLINIC | Facility: CLINIC | Age: 38
End: 2023-09-08
Payer: MEDICARE

## 2023-09-08 VITALS
BODY MASS INDEX: 29.26 KG/M2 | HEIGHT: 71 IN | HEART RATE: 72 BPM | DIASTOLIC BLOOD PRESSURE: 80 MMHG | SYSTOLIC BLOOD PRESSURE: 132 MMHG | WEIGHT: 209 LBS

## 2023-09-08 DIAGNOSIS — Z95.818 PRESENCE OF OTHER CARDIAC IMPLANTS AND GRAFTS: Primary | ICD-10-CM

## 2023-09-08 DIAGNOSIS — Z99.2 END STAGE RENAL DISEASE ON DIALYSIS DUE TO TYPE 1 DIABETES MELLITUS (HCC): ICD-10-CM

## 2023-09-08 DIAGNOSIS — N18.6 END STAGE RENAL DISEASE ON DIALYSIS DUE TO TYPE 1 DIABETES MELLITUS (HCC): ICD-10-CM

## 2023-09-08 DIAGNOSIS — G47.33 OSA (OBSTRUCTIVE SLEEP APNEA): ICD-10-CM

## 2023-09-08 DIAGNOSIS — Z99.2 TYPE 1 DIABETES MELLITUS WITH CHRONIC KIDNEY DISEASE ON CHRONIC DIALYSIS (HCC): ICD-10-CM

## 2023-09-08 DIAGNOSIS — E10.22 END STAGE RENAL DISEASE ON DIALYSIS DUE TO TYPE 1 DIABETES MELLITUS (HCC): ICD-10-CM

## 2023-09-08 DIAGNOSIS — I27.20 PULMONARY HTN (HCC): ICD-10-CM

## 2023-09-08 DIAGNOSIS — Z95.818 STATUS POST PLACEMENT OF IMPLANTABLE LOOP RECORDER: ICD-10-CM

## 2023-09-08 DIAGNOSIS — I25.10 CORONARY ARTERY DISEASE INVOLVING NATIVE CORONARY ARTERY OF NATIVE HEART WITHOUT ANGINA PECTORIS: ICD-10-CM

## 2023-09-08 DIAGNOSIS — N18.6 TYPE 1 DIABETES MELLITUS WITH CHRONIC KIDNEY DISEASE ON CHRONIC DIALYSIS (HCC): ICD-10-CM

## 2023-09-08 DIAGNOSIS — E10.22 TYPE 1 DIABETES MELLITUS WITH CHRONIC KIDNEY DISEASE ON CHRONIC DIALYSIS (HCC): ICD-10-CM

## 2023-09-08 PROCEDURE — 99213 OFFICE O/P EST LOW 20 MIN: CPT | Performed by: PHYSICIAN ASSISTANT

## 2023-09-08 PROCEDURE — 93000 ELECTROCARDIOGRAM COMPLETE: CPT | Performed by: PHYSICIAN ASSISTANT

## 2023-09-08 NOTE — PATIENT INSTRUCTIONS
You are scheduled for explant of your loop recorder on 9/21/2023 by Dr. Nic Calix  Hold insulin pump the night ahead of time  Nothing to eat or drink after midnight the night before the procedure

## 2023-09-15 ENCOUNTER — RA CDI HCC (OUTPATIENT)
Dept: OTHER | Facility: HOSPITAL | Age: 38
End: 2023-09-15

## 2023-09-15 NOTE — PROGRESS NOTES
[facility] coding opportunities          Chart Reviewed number of suggestions sent to Provider: 2     Patients Insurance     Medicare Insurance: Medicare        [id number]  [id number]

## 2023-09-21 ENCOUNTER — HOSPITAL ENCOUNTER (OUTPATIENT)
Facility: HOSPITAL | Age: 38
Setting detail: OUTPATIENT SURGERY
Discharge: HOME | End: 2023-09-21
Attending: INTERNAL MEDICINE | Admitting: INTERNAL MEDICINE
Payer: MEDICARE

## 2023-09-21 VITALS
WEIGHT: 211.2 LBS | SYSTOLIC BLOOD PRESSURE: 118 MMHG | BODY MASS INDEX: 29.57 KG/M2 | HEIGHT: 71 IN | RESPIRATION RATE: 18 BRPM | HEART RATE: 70 BPM | OXYGEN SATURATION: 99 % | TEMPERATURE: 98.4 F | DIASTOLIC BLOOD PRESSURE: 77 MMHG

## 2023-09-21 DIAGNOSIS — Z95.818 STATUS POST PLACEMENT OF IMPLANTABLE LOOP RECORDER: ICD-10-CM

## 2023-09-21 PROCEDURE — 33286 RMVL SUBQ CAR RHYTHM MNTR: CPT | Performed by: INTERNAL MEDICINE

## 2023-09-21 RX ORDER — LIDOCAINE HYDROCHLORIDE 10 MG/ML
INJECTION, SOLUTION EPIDURAL; INFILTRATION; INTRACAUDAL; PERINEURAL CODE/TRAUMA/SEDATION MEDICATION
Status: DISCONTINUED | OUTPATIENT
Start: 2023-09-21 | End: 2023-09-21 | Stop reason: HOSPADM

## 2023-09-21 NOTE — INTERVAL H&P NOTE
H&P reviewed. After examining the patient I find no changes in the patients condition since the H&P had been written. Vitals:    09/21/23 0715   BP: 118/77   Pulse: 70   Resp: 18   Temp: 98.4 °F (36.9 °C)   SpO2: 99%   Plan for explant today secondary to requirement for transplant surgery.

## 2023-09-22 ENCOUNTER — HOSPITAL ENCOUNTER (EMERGENCY)
Facility: HOSPITAL | Age: 38
Discharge: HOME | End: 2023-09-22
Attending: EMERGENCY MEDICINE
Payer: MEDICARE

## 2023-09-22 ENCOUNTER — OFFICE VISIT (OUTPATIENT)
Dept: INTERNAL MEDICINE CLINIC | Facility: CLINIC | Age: 38
End: 2023-09-22
Payer: MEDICARE

## 2023-09-22 VITALS
SYSTOLIC BLOOD PRESSURE: 185 MMHG | OXYGEN SATURATION: 100 % | HEART RATE: 92 BPM | DIASTOLIC BLOOD PRESSURE: 104 MMHG | RESPIRATION RATE: 20 BRPM | TEMPERATURE: 98.1 F

## 2023-09-22 VITALS
RESPIRATION RATE: 16 BRPM | WEIGHT: 210.3 LBS | TEMPERATURE: 97 F | DIASTOLIC BLOOD PRESSURE: 74 MMHG | BODY MASS INDEX: 29.44 KG/M2 | HEIGHT: 71 IN | HEART RATE: 69 BPM | SYSTOLIC BLOOD PRESSURE: 120 MMHG | OXYGEN SATURATION: 97 %

## 2023-09-22 DIAGNOSIS — R91.8 MULTIPLE PULMONARY NODULES: ICD-10-CM

## 2023-09-22 DIAGNOSIS — Z86.73 HISTORY OF LACUNAR CEREBROVASCULAR ACCIDENT (CVA): Chronic | ICD-10-CM

## 2023-09-22 DIAGNOSIS — K08.89 PAIN, DENTAL: Primary | ICD-10-CM

## 2023-09-22 DIAGNOSIS — Z99.2 END STAGE RENAL DISEASE ON DIALYSIS DUE TO TYPE 1 DIABETES MELLITUS (HCC): ICD-10-CM

## 2023-09-22 DIAGNOSIS — N18.6 END STAGE RENAL DISEASE ON DIALYSIS DUE TO TYPE 1 DIABETES MELLITUS (HCC): ICD-10-CM

## 2023-09-22 DIAGNOSIS — Z23 ENCOUNTER FOR IMMUNIZATION: Primary | ICD-10-CM

## 2023-09-22 DIAGNOSIS — E10.22 END STAGE RENAL DISEASE ON DIALYSIS DUE TO TYPE 1 DIABETES MELLITUS (HCC): ICD-10-CM

## 2023-09-22 PROBLEM — L60.1 TRAUMATIC ONYCHOLYSIS: Status: RESOLVED | Noted: 2022-07-21 | Resolved: 2023-09-22

## 2023-09-22 PROCEDURE — 99214 OFFICE O/P EST MOD 30 MIN: CPT | Performed by: INTERNAL MEDICINE

## 2023-09-22 PROCEDURE — 90686 IIV4 VACC NO PRSV 0.5 ML IM: CPT

## 2023-09-22 PROCEDURE — 99284 EMERGENCY DEPT VISIT MOD MDM: CPT | Performed by: EMERGENCY MEDICINE

## 2023-09-22 PROCEDURE — 99282 EMERGENCY DEPT VISIT SF MDM: CPT

## 2023-09-22 PROCEDURE — G0008 ADMIN INFLUENZA VIRUS VAC: HCPCS

## 2023-09-22 RX ORDER — ACETAMINOPHEN 325 MG/1
650 TABLET ORAL ONCE
Status: COMPLETED | OUTPATIENT
Start: 2023-09-22 | End: 2023-09-22

## 2023-09-22 RX ORDER — AMOXICILLIN 250 MG/1
250 CAPSULE ORAL ONCE
Status: COMPLETED | OUTPATIENT
Start: 2023-09-22 | End: 2023-09-22

## 2023-09-22 RX ORDER — AMOXICILLIN 250 MG/1
250 CAPSULE ORAL DAILY
Qty: 7 CAPSULE | Refills: 0 | Status: SHIPPED | OUTPATIENT
Start: 2023-09-22 | End: 2023-09-29

## 2023-09-22 RX ORDER — SUCROFERRIC OXYHYDROXIDE 500 MG/1
TABLET, CHEWABLE ORAL
COMMUNITY
Start: 2023-09-13

## 2023-09-22 RX ADMIN — AMOXICILLIN 250 MG: 250 CAPSULE ORAL at 17:07

## 2023-09-22 RX ADMIN — ACETAMINOPHEN 650 MG: 325 TABLET, FILM COATED ORAL at 16:49

## 2023-09-22 NOTE — DISCHARGE INSTRUCTIONS
Take amoxicillin 1 capsule a day for the next 7 days. Follow-up with your dentist.  Follow-up with your PCP. Return sooner to the ED if you experience worsening symptoms.

## 2023-09-22 NOTE — ED ATTENDING ATTESTATION
9/22/2023  Sue VOGEL DO, saw and evaluated the patient. I have discussed the patient with the resident/non-physician practitioner and agree with the resident's/non-physician practitioner's findings, Plan of Care, and MDM as documented in the resident's/non-physician practitioner's note, except where noted. All available labs and Radiology studies were reviewed. I was present for key portions of any procedure(s) performed by the resident/non-physician practitioner and I was immediately available to provide assistance. At this point I agree with the current assessment done in the Emergency Department. I have conducted an independent evaluation of this patient a history and physical is as follows:    Patient is a 49-year-old male with a history of end-stage renal disease on hemodialysis who presents with right ear pain. Patient states that his pain started early this morning. He describes pain in his right ear which radiates into his right jaw. He went to his PCP, who thought his pain may be secondary to dental pain. However his pain has persisted, which prompted his evaluation in the emergency department today. He denies any difficulty breathing or difficulty swallowing. He denies any swelling inside his mouth or to his face. He denies any recent fevers or chills. He denies nasal congestion, sinus pressure or other concerns. He has been receiving his dialysis as scheduled. He has no complaints of chest pain or other concerns at this time. On exam, patient is in no acute distress. Heart is regular rate and rhythm. Breath sounds normal.  Right middle ear effusion with dull TM. Minimal erythema. Poor dentition overall, with no evidence of intraoral abscess. No tenderness to percussion of the teeth. No angioedema. Uvula midline. Voice normal.    Will treat for AOM. No evidence of otitis externa, mastoiditis or dental abscess.  Patient is well appearing and stable for outpatient management. Abx were renally dosed. Patient to follow up as outpatient and return to ED if symptoms worsen. Portions of the above record have been created with voice recognition software. Occasional wrong word or "sound alike" substitutions may have occurred due to the inherent limitations of voice recognition software. Read the chart carefully and recognize, using context, where substitutions may have occurred.       ED Course         Critical Care Time  Procedures

## 2023-09-22 NOTE — PROGRESS NOTES
Assessment/Plan:    Problem List Items Addressed This Visit        Endocrine    End stage renal disease on dialysis due to type 1 diabetes mellitus ([address])     -pt now being trained for in home HD to be done four times weekly  -follow up with Nephrology            Respiratory    Multiple pulmonary nodules     -CT chest 6/2023 shows stable size of largest LLL, 7mm, since 2019  -benign            Other    History of lacunar cerebrovascular accident (CVA) (Chronic)     -loop recorder explanted yesterday   -neg afib episodes  -pt remains on ASA and lipitor        Other Visit Diagnoses     Encounter for immunization    -  Primary    Relevant Orders    influenza vaccine, quadrivalent, 0.5 mL, preservative-free, for adult and pediatric patients 6 mos+ (AFLURIA, FLUARIX, FLULAVAL, FLUZONE) (Completed)          Subjective:      Patient ID: Ana Michael is a 34 y.o. male. HPI  40yo male with DM1 with neuropathy, gastroparesis, ESRD on HD, MDD, ELIAS, nonobstructive CAD, renovascular HTN with h/o CVA, RACHEAL, vertigo, hypothyroidism, vitamin D def and anemia here for follow up care. Had loop recorder explanted yesterday. Had pain when he turned in bed but otherwise ok. Patient smoked for 10 years and worked as a . Had CT chest 6/2023 that showed stable sizes of pulmonary nodules, with largest 7mm LLL. He denies SOB, cough, wheeze. He is training to do at home dialysis, still HD, he would do 4x weekly.     The following portions of the patient's history were reviewed and updated as appropriate: allergies, current medications, past family history, past medical history, past social history, past surgical history and problem list.      Current Outpatient Medications:   •  aspirin (ECOTRIN LOW STRENGTH) 81 mg EC tablet, Take 81 mg by mouth daily, Disp: , Rfl:   •  atorvastatin (LIPITOR) 40 mg tablet, Take 1 tablet (40 mg total) by mouth every evening, Disp: 90 tablet, Rfl: 3  •  b complex vitamins capsule, Take 1 capsule by mouth daily before lunch , Disp: , Rfl:   •  B-D ULTRAFINE III SHORT PEN 31G X 8 MM MISC, USE 1 PEN NEEDLE 8 TIMES DAILY, Disp: 100 each, Rfl: 47  •  calcium acetate (PHOSLO) capsule, TAKE 3 CAPSULES BY MOUTH WITH MEALS, Disp: , Rfl:   •  cinacalcet (SENSIPAR) 60 MG tablet, Take 120 mg by mouth daily 2 tab daily , Disp: , Rfl:   •  cloNIDine (CATAPRES-TTS-3) 0.3 mg/24 hr, 1 patch once a week, Disp: , Rfl:   •  clotrimazole (LOTRIMIN) 1 % cream, Apply to affected area 2 times daily for 2 weeks, Disp: 15 g, Rfl: 0  •  escitalopram (LEXAPRO) 20 mg tablet, Take 1 tablet (20 mg total) by mouth daily, Disp: 90 tablet, Rfl: 2  •  famotidine (PEPCID) 40 MG tablet, Take 1 tablet (40 mg total) by mouth in the morning, Disp: 90 tablet, Rfl: 1  •  gabapentin (NEURONTIN) 100 mg capsule, TAKE 2 CAPSULES BY MOUTH AT BEDTIME, Disp: 180 capsule, Rfl: 1  •  GLUCAGON EMERGENCY 1 MG injection, , Disp: , Rfl: 3  •  Gvoke HypoPen 1-Pack 1 MG/0.2ML SOAJ, , Disp: , Rfl:   •  hydrALAZINE (APRESOLINE) 25 mg tablet, Take 1 tablet (25 mg total) by mouth 3 (three) times a day, Disp: 270 tablet, Rfl: 0  •  hydrOXYzine HCL (ATARAX) 10 mg tablet, Take 1 tablet (10 mg total) by mouth every 6 (six) hours as needed for itching or anxiety, Disp: 30 tablet, Rfl: 3  •  Insulin Disposable Pump (Omnipod 5 G6 Intro, Gen 5,) KIT, , Disp: , Rfl:   •  Insulin Disposable Pump (Omnipod 5 G6 Pod, Gen 5,) MISC, , Disp: , Rfl:   •  insulin glargine (Basaglar KwikPen) 100 units/mL injection pen, Inject 7 Units under the skin daily at bedtime, Disp: 3 mL, Rfl: 0  •  labetalol (NORMODYNE) 200 mg tablet, TAKE 2 TABLETS BY MOUTH THREE TIMES DAILY, Disp: 180 tablet, Rfl: 0  •  lisinopril (ZESTRIL) 40 mg tablet, Take 80 mg by mouth daily, Disp: , Rfl:   •  mupirocin (BACTROBAN) 2 % ointment, Apply topically 3 (three) times a day Until lesion on right hand heals. , Disp: 30 g, Rfl: 4  •  NIFEdipine (PROCARDIA XL) 90 mg 24 hr tablet, Take 1 tablet (90 mg total) by mouth daily, Disp: 30 tablet, Rfl: 0  •  NIFEdipine ER (ADALAT CC) 60 MG 24 hr tablet, Take 1 tablet (60 mg total) by mouth 2 (two) times a day, Disp: 180 tablet, Rfl: 0  •  NovoLOG 100 UNIT/ML injection, , Disp: , Rfl:   •  NovoLOG FlexPen ReliOn 100 UNIT/ML injection pen, , Disp: , Rfl:   •  ofloxacin (FLOXIN) 0.3 % otic solution, Administer 10 drops to the right ear daily, Disp: 5 mL, Rfl: 0  •  ondansetron (ZOFRAN) 4 mg tablet, Take 1 tablet (4 mg total) by mouth every 8 (eight) hours as needed for nausea or vomiting, Disp: 90 tablet, Rfl: 0  •  Patiromer Sorbitex Calcium (VELTASSA PO), Take 5 g by mouth once a week, Disp: , Rfl:   •  saccharomyces boulardii (FLORASTOR) 250 mg capsule, Take 250 mg by mouth daily, Disp: , Rfl:   •  SPS 15 GM/60ML suspension, TAKE 60 ML BY MOUTH SINGLE DOSE FOR EMERGENCY USE DURING MISSED HEMODIALYSIS, Disp: , Rfl:   •  torsemide (DEMADEX) 100 mg tablet, Take 100 mg by mouth daily, Disp: , Rfl:   •  traZODone (DESYREL) 50 mg tablet, Take 0.5 tablets (25 mg total) by mouth daily at bedtime as needed for sleep, Disp: 30 tablet, Rfl: 3  •  triamcinolone (KENALOG) 0.1 % ointment, Apply topically 2 (two) times a day For up to 14 days on the itchy areas. Avoid groin, underarms and face. It is important to take at least 1 week break between uses. , Disp: 454 g, Rfl: 0  •  Velphoro 500 MG CHEW, CRUSH OR CHEW AND SWALLOW 2 TABLETS 3 TIMES A DAY WITH MEALS, Disp: , Rfl:     Review of Systems   Constitutional: Negative for activity change, appetite change and unexpected weight change. HENT: Negative for congestion, postnasal drip and rhinorrhea. Eyes: Positive for visual disturbance. Respiratory: Negative for cough, chest tightness, shortness of breath and wheezing. Cardiovascular: Negative for chest pain, palpitations and leg swelling. Gastrointestinal: Positive for nausea. Negative for abdominal pain, constipation, diarrhea and vomiting.    Musculoskeletal: Positive for arthralgias. Skin: Positive for wound. Neurological: Positive for dizziness and numbness. Negative for headaches. Psychiatric/Behavioral: Positive for dysphoric mood. Negative for sleep disturbance. Objective:    /74   Pulse 69   Temp (!) 97 °F (36.1 °C)   Resp 16   Ht 5' 11" (1.803 m)   Wt 95.4 kg (210 lb 4.8 oz)   SpO2 97%   BMI 29.33 kg/m²      Physical Exam  Vitals reviewed. Constitutional:       General: He is not in acute distress. HENT:      Head: Normocephalic. Right Ear: There is impacted cerumen. Left Ear: There is impacted cerumen. Nose: Nose normal.      Mouth/Throat:      Mouth: Mucous membranes are moist.   Cardiovascular:      Rate and Rhythm: Normal rate and regular rhythm. Pulses: Normal pulses. Heart sounds: Normal heart sounds. Comments: L AVF  Pulmonary:      Effort: Pulmonary effort is normal.      Breath sounds: Normal breath sounds. Chest:      Comments: Slight erythema and bruising on L anterior chest wall with dressing intact  Musculoskeletal:      Right lower leg: No edema. Left lower leg: No edema. Neurological:      Mental Status: He is alert and oriented to person, place, and time.    Psychiatric:         Mood and Affect: Mood normal.

## 2023-09-22 NOTE — ED PROVIDER NOTES
History  Chief Complaint   Patient presents with   • Dental Pain     Pt reports he thought he had ear infection and saw PCP and stated that it might be dental, c/o right lower jaw pain     (Sharad Walters) Kenneth Maurice is a 34 y.o. male who identifies as male    They presented to the emergency department on September 22, 2023. Patient presents with:  Dental Pain: Pt reports he thought he had ear infection and saw PCP and stated that it might be dental, c/o right lower jaw pain  . The patient states that around 3 AM this morning he started experiencing right ear pain. He thought he was having a an ear infection however when he saw his PCP today, PCP thought it might actually be a tooth infection. Patient reported calling his dentist to make an appointment however he could not get a hold of him. Patient stated that he was at the dialysis center today. He normally sleep through the dialysis session however he could not sleep due to pain in the right ear and right lower jaw. Patient denies fever, nausea, shortness of breath, chest pain, recent illness, constitutional symptoms, sinus pressure, pain with swallowing, or any other complaint at this time.       Allergies include:  -- Sulfa Antibiotics -- Rash      Immunizations:    Immunization History  Administered            Date(s) Administered   COVID-19 PFIZER VACCINE 0.3 ML IM                         03/02/2021  03/22/2021  10/13/2021                           05/26/2022     COVID-19 Pfizer Vac BIVALENT Alonso-sucrose 12 Yr+ IM                         10/19/2022     INFLUENZA             01/22/2018  10/07/2019  09/13/2022     Influenza A Monovalent (H5n1), Adjuvanted, National Stock3                         01/22/2018     Influenza Quadrivalent 3 years and older                         10/31/2019     Influenza Quadrivalent Preservative Free 3 years and older IM                         02/05/2018     Influenza, injectable, quadrivalent, preservative free 0.5 mL                         09/24/2018  10/05/2021  09/22/2023     Influenza, recombinant, quadrivalent,injectable, preservative free                         09/30/2020 09/13/2022     Influenza, seasonal, injectable                         11/10/2015     Influenza, seasonal, injectable, preservative free                         09/12/2017  09/24/2018  09/30/2020                           10/05/2021     Pneumococcal Polysaccharide PPV23                         08/31/2018 03/08/2019     Tdap                  03/08/2019     Tuberculin Skin Test-PPD Intradermal                         08/29/2018  09/03/2019  10/06/2020    Immunizations Reviewed. Prior to Admission Medications   Prescriptions Last Dose Informant Patient Reported? Taking?    B-D ULTRAFINE III SHORT PEN 31G X 8 MM MISC  Self No No   Sig: USE 1 PEN NEEDLE 8 TIMES DAILY   GLUCAGON EMERGENCY 1 MG injection  Self Yes No   Gvoke HypoPen 1-Pack 1 MG/0.2ML SOAJ  Self Yes No   Insulin Disposable Pump (Omnipod 5 G6 Intro, Gen 5,) KIT  Self Yes No   Insulin Disposable Pump (Omnipod 5 G6 Pod, Gen 5,) MISC  Self Yes No   NIFEdipine (PROCARDIA XL) 90 mg 24 hr tablet  Self No No   Sig: Take 1 tablet (90 mg total) by mouth daily   NIFEdipine ER (ADALAT CC) 60 MG 24 hr tablet  Self No No   Sig: Take 1 tablet (60 mg total) by mouth 2 (two) times a day   NovoLOG 100 UNIT/ML injection  Self Yes No   NovoLOG FlexPen ReliOn 100 UNIT/ML injection pen  Self Yes No   Patiromer Sorbitex Calcium (VELTASSA PO)  Self Yes No   Sig: Take 5 g by mouth once a week   SPS 15 GM/60ML suspension  Self Yes No   Sig: TAKE 60 ML BY MOUTH SINGLE DOSE FOR EMERGENCY USE DURING MISSED HEMODIALYSIS   Velphoro 500 MG CHEW   Yes No   Sig: CRUSH OR CHEW AND SWALLOW 2 TABLETS 3 TIMES A DAY WITH MEALS   aspirin (ECOTRIN LOW STRENGTH) 81 mg EC tablet  Self Yes No   Sig: Take 81 mg by mouth daily   atorvastatin (LIPITOR) 40 mg tablet  Self No No   Sig: Take 1 tablet (40 mg total) by mouth every evening   b complex vitamins capsule  Self Yes No   Sig: Take 1 capsule by mouth daily before lunch    calcium acetate (PHOSLO) capsule  Self Yes No   Sig: TAKE 3 CAPSULES BY MOUTH WITH MEALS   cinacalcet (SENSIPAR) 60 MG tablet  Self Yes No   Sig: Take 120 mg by mouth daily 2 tab daily    cloNIDine (CATAPRES-TTS-3) 0.3 mg/24 hr  Self Yes No   Si patch once a week   clotrimazole (LOTRIMIN) 1 % cream  Self No No   Sig: Apply to affected area 2 times daily for 2 weeks   escitalopram (LEXAPRO) 20 mg tablet  Self No No   Sig: Take 1 tablet (20 mg total) by mouth daily   famotidine (PEPCID) 40 MG tablet  Self No No   Sig: Take 1 tablet (40 mg total) by mouth in the morning   gabapentin (NEURONTIN) 100 mg capsule  Self No No   Sig: TAKE 2 CAPSULES BY MOUTH AT BEDTIME   hydrALAZINE (APRESOLINE) 25 mg tablet  Self No No   Sig: Take 1 tablet (25 mg total) by mouth 3 (three) times a day   hydrOXYzine HCL (ATARAX) 10 mg tablet  Self No No   Sig: Take 1 tablet (10 mg total) by mouth every 6 (six) hours as needed for itching or anxiety   insulin glargine (Basaglar KwikPen) 100 units/mL injection pen  Self No No   Sig: Inject 7 Units under the skin daily at bedtime   labetalol (NORMODYNE) 200 mg tablet  Self No No   Sig: TAKE 2 TABLETS BY MOUTH THREE TIMES DAILY   lisinopril (ZESTRIL) 40 mg tablet  Self Yes No   Sig: Take 80 mg by mouth daily   mupirocin (BACTROBAN) 2 % ointment  Self No No   Sig: Apply topically 3 (three) times a day Until lesion on right hand heals.    ofloxacin (FLOXIN) 0.3 % otic solution  Self No No   Sig: Administer 10 drops to the right ear daily   ondansetron (ZOFRAN) 4 mg tablet  Self No No   Sig: Take 1 tablet (4 mg total) by mouth every 8 (eight) hours as needed for nausea or vomiting   saccharomyces boulardii (FLORASTOR) 250 mg capsule  Self Yes No   Sig: Take 250 mg by mouth daily   torsemide (DEMADEX) 100 mg tablet  Self Yes No   Sig: Take 100 mg by mouth daily   traZODone (DESYREL) 50 mg tablet  Self No No   Sig: Take 0.5 tablets (25 mg total) by mouth daily at bedtime as needed for sleep   triamcinolone (KENALOG) 0.1 % ointment  Self No No   Sig: Apply topically 2 (two) times a day For up to 14 days on the itchy areas. Avoid groin, underarms and face. It is important to take at least 1 week break between uses. Facility-Administered Medications: None       Past Medical History:   Diagnosis Date   • Acute kidney injury ([address])    • Ambulates with cane    • Anuria    • Anxiety    • Cellulitis of right elbow 03/31/2021   • Chronic kidney disease    • Depression    • Diabetes mellitus ([address])    • Diarrhea    • Emesis 10/24/2020   • End stage renal disease ([address]) 02/11/2018    Formatting of this note might be different from the original. Last Assessment & Plan:  Secondary to DM. On nightly PD. Followed by [facility].   Patient considering transplant for kidney and pancreas through [address] of this note might be different from the original. Last Assessment & Plan:  Formatting of this note might be different from the original. Lab Results  Component Value Date   EGFR    • Esophagitis 7/21/2015   • Falls    • Gastroparesis    • GERD (gastroesophageal reflux disease)    • History of shingles 2010   • History of transfusion 02/2018    no adverse reaction   • Hyperlipidemia    • Hyperphosphatemia    • Hypertension    • Hypoglycemia 7/15/2022   • Itching    • Mastoiditis of right side 07/15/2022   • Muscle weakness     general unsteadiness   • Obesity (BMI 30.0-34.9) 09/09/2019   • Orthostatic hypotension 10/25/2020   • Peripheral polyneuropathy 11/20/2019   • PONV (postoperative nausea and vomiting) 01/26/2018   • Protein-calorie malnutrition ([address]) 11/23/2020   • Recurrent peritonitis ([address]) due to peritoneal dialysis catheter 07/31/2020   • Retinopathy    • Seizures ([address])     early 2020 - one time   • Skin abnormality     some dime size areas where skin was scratched from itching   • Spontaneous bacterial peritonitis ([address]) 10/19/2020   • Squamous cell skin cancer     left temple   • Stroke [facility])     x2 - off balance/no driving/fatigue   • Swelling of both lower extremities    • Traumatic onycholysis 7/21/2022   • Vomiting    • Wears glasses        Past Surgical History:   Procedure Laterality Date   • CARDIAC ELECTROPHYSIOLOGY PROCEDURE N/A 9/21/2023    Procedure: Cardiac loop recorder explant;  Surgeon: Ely Stewart MD;  Location: [facility]; Service: Cardiology   • CARDIAC LOOP RECORDER  05/2018   • COLONOSCOPY     • EGD     • EYE SURGERY Right    • IR AV FISTULAGRAM/GRAFTOGRAM  02/23/2021   • IR TUNNELED CENTRAL LINE PLACEMENT  02/16/2021   • IR TUNNELED DIALYSIS CATHETER PLACEMENT  11/18/2020   • IR TUNNELED DIALYSIS CATHETER REMOVAL  02/12/2021   • IR TUNNELED DIALYSIS CATHETER REMOVAL  03/11/2021   • MOHS SURGERY Left 12/14/2022    Left temple with Dr. Carlos Larkin   • [address] N/A 08/27/2018    Procedure: UNROOF PD CATHETER;  Surgeon: Laurel Fernandez DO;  Location: [facility];  Service: General   • NM ARTERIOVENOUS ANASTOMOSIS OPEN DIRECT Left 11/09/2020    Procedure: CREATION FISTULA  ARTERIOVENOUS (AV) - LEFT WRIST;  Surgeon: Marlin De Leon MD;  Location: [facility];  Service: Vascular   • NM ESOPHAGOGASTRODUODENOSCOPY TRANSORAL DIAGNOSTIC N/A 04/18/2019    Procedure: ESOPHAGOGASTRODUODENOSCOPY (EGD); Surgeon: Yadira Vernon MD;  Location: [facility];   Service: Gastroenterology   • NM LAPS INSERTION TUNNELED INTRAPERITONEAL CATHETER N/A 08/06/2018    Procedure: LAPAROSCOPIC PD CATHETER PLACEMENT;  Surgeon: Laurel Fernandez DO;  Location: [facility];  Service: General   • NM REMOVAL TUNNELED INTRAPERITONEAL CATHETER N/A 11/18/2020    Procedure: REMOVAL CATHETER PERITONEAL DIALYSIS;  Surgeon: Leticia Ochoa MD;  Location: [facility];  Service: General   • TONSILLECTOMY     • UPPER GASTROINTESTINAL ENDOSCOPY         Family History   Problem Relation Age of Onset   • Breast cancer Mother • Hypertension Mother    • Hyperlipidemia Father    • Hypertension Father    • Leukemia Maternal Grandmother    • Hyperlipidemia Maternal Grandfather    • Hypertension Maternal Grandfather    • Hyperlipidemia Paternal Grandmother    • Hypertension Paternal Grandmother    • Heart disease Paternal Grandfather         cardiac disorder   • Diabetes Paternal Grandfather      I have reviewed and agree with the history as documented. E-Cigarette/Vaping   • E-Cigarette Use Current Every Day User    • Comments medical marijuana      E-Cigarette/Vaping Substances   • Nicotine No    • THC Yes    • CBD Yes    • Flavoring No    • Other No    • Unknown No      Social History     Tobacco Use   • Smoking status: Former     Packs/day: 0.50     Years: 12.00     Total pack years: 6.00     Types: Cigarettes     Quit date: 2018     Years since quittin.6   • Smokeless tobacco: Never   • Tobacco comments:     quit 2018   Vaping Use   • Vaping Use: Every day   • Substances: THC, CBD   Substance Use Topics   • Alcohol use: Not Currently     Comment: occassionally   • Drug use: Yes     Types: Marijuana     Comment: medical marijuana        Review of Systems   Constitutional: Negative for chills and fever. HENT: Positive for ear pain (Right ear). Negative for sore throat. Eyes: Negative for pain and visual disturbance. Respiratory: Negative for cough and shortness of breath. Cardiovascular: Negative for chest pain and palpitations. Gastrointestinal: Negative for abdominal pain and vomiting. Genitourinary: Negative for dysuria and hematuria. Musculoskeletal: Positive for arthralgias (Right sided lower jaw pain). Negative for back pain. Skin: Negative for color change and rash. Neurological: Negative for seizures and syncope. All other systems reviewed and are negative.       Physical Exam  ED Triage Vitals   Temperature Pulse Respirations Blood Pressure SpO2   23 1521 23 1521 23 1521 09/22/23 1521 09/22/23 1521   98.1 °F (36.7 °C) 92 20 (!) 185/104 100 %      Temp Source Heart Rate Source Patient Position - Orthostatic VS BP Location FiO2 (%)   09/22/23 1521 09/22/23 1521 09/22/23 1521 09/22/23 1521 --   Oral Monitor Sitting Right arm       Pain Score       09/22/23 1649       6             Orthostatic Vital Signs  Vitals:    09/22/23 1521   BP: (!) 185/104   Pulse: 92   Patient Position - Orthostatic VS: Sitting       Physical Exam  Vitals and nursing note reviewed. Constitutional:       General: He is not in acute distress. Appearance: He is well-developed. HENT:      Head: Normocephalic and atraumatic. Comments: Right sided tympanic membrane bulging present. No sign of erythema. There is presence of fluid behind the tympanic membrane. Right lower jaw tenderness present on exam.     Mouth/Throat:        Comments: Patient has a prior root canal done on the lower third molar teeth. No gingival swelling, no sign of infection. Eyes:      Conjunctiva/sclera: Conjunctivae normal.   Cardiovascular:      Rate and Rhythm: Normal rate and regular rhythm. Heart sounds: No murmur heard. Pulmonary:      Effort: Pulmonary effort is normal. No respiratory distress. Breath sounds: Normal breath sounds. Abdominal:      Palpations: Abdomen is soft. Tenderness: There is no abdominal tenderness. Musculoskeletal:         General: No swelling. Cervical back: Neck supple. Skin:     General: Skin is warm and dry. Capillary Refill: Capillary refill takes less than 2 seconds. Neurological:      Mental Status: He is alert.    Psychiatric:         Mood and Affect: Mood normal.         ED Medications  Medications   acetaminophen (TYLENOL) tablet 650 mg (650 mg Oral Given 9/22/23 1649)   amoxicillin (AMOXIL) capsule 250 mg (250 mg Oral Given 9/22/23 1707)       Diagnostic Studies  Results Reviewed     None                 No orders to display Procedures  Procedures      ED Course  ED Course as of 09/23/23 0302   Sat Sep 23, 2023   186 78-year-old male presented for evaluation of dental pain. 3602 Patient given amoxicillin and Tylenol and discharged with 7 days prescription of amoxicillin. Return precautions discussed                                       Medical Decision Making  Patient presents with:  Dental Pain: Pt reports he thought he had ear infection and saw PCP and stated that it might be dental, c/o right lower jaw pain      Patient seen and examined noted to have dental pain, jaw pain, right ear pain. Temp:  (97 °F (36.1 °C)-98.1 °F (36.7 °C)) 98.1 °F (36.7 °C)  HR:  (69-92) 92  Resp:  (16-20) 20  BP: (120-185)/() 185/104    Differential diagnosis includes but is not limited to otitis media, dental infection, gingivitis among others. Patient was administered:      Medication Administration - last 24 hours from 09/22/2023 0259 to   09/23/2023 0259       Date/Time Order Dose Route Action Action by     09/22/2023 1649 EDT acetaminophen (TYLENOL) tablet 650 mg 650 mg Oral   Given Melanie Overall, RN     09/22/2023 1707 EDT amoxicillin (AMOXIL) capsule 250 mg 250 mg Oral   Given Melanie Overall, RN        Patient appears well, is nontoxic appearing, patient expresses understanding and agrees with plan of care at this time. In light of this patient would benefit from outpatient management. Patient was rx'd: 7 days course of amoxicillin. Return precautions discussed. Pain, dental: acute illness or injury  Amount and/or Complexity of Data Reviewed  Independent Historian: spouse      Risk  OTC drugs. Prescription drug management.             Disposition  Final diagnoses:   Pain, dental     Time reflects when diagnosis was documented in both MDM as applicable and the Disposition within this note     Time User Action Codes Description Comment    9/22/2023  5:00 PM Cecy Soto Add [K08.89] Pain, dental ED Disposition     ED Disposition   Discharge    Condition   Stable    Date/Time   Fri Sep 22, 2023  5:03 PM    Comment   Ok Mckeon discharge to home/self care.                Follow-up Information     Follow up With Specialties Details [facility]  [address], [facility]   [facility]  [address]nd [address], [address]  [phone]            Discharge Medication List as of 9/22/2023  5:03 PM      START taking these medications    Details   amoxicillin (AMOXIL) 250 mg capsule Take 1 capsule (250 mg total) by mouth in the morning for 7 days, Starting Fri 9/22/2023, Until Fri 9/29/2023, Normal         CONTINUE these medications which have NOT CHANGED    Details   aspirin (ECOTRIN LOW STRENGTH) 81 mg EC tablet Take 81 mg by mouth daily, Historical Med      atorvastatin (LIPITOR) 40 mg tablet Take 1 tablet (40 mg total) by mouth every evening, Starting Wed 6/28/2023, Normal      b complex vitamins capsule Take 1 capsule by mouth daily before lunch , Historical Med      B-D ULTRAFINE III SHORT PEN 31G X 8 MM MISC USE 1 PEN NEEDLE 8 TIMES DAILY, Normal      calcium acetate (PHOSLO) capsule TAKE 3 CAPSULES BY MOUTH WITH MEALS, Historical Med      cinacalcet (SENSIPAR) 60 MG tablet Take 120 mg by mouth daily 2 tab daily , Historical Med      cloNIDine (CATAPRES-TTS-3) 0.3 mg/24 hr 1 patch once a week, Historical Med      clotrimazole (LOTRIMIN) 1 % cream Apply to affected area 2 times daily for 2 weeks, Normal      escitalopram (LEXAPRO) 20 mg tablet Take 1 tablet (20 mg total) by mouth daily, Starting Wed 3/15/2023, Normal      famotidine (PEPCID) 40 MG tablet Take 1 tablet (40 mg total) by mouth in the morning, Starting Wed 6/28/2023, Normal      gabapentin (NEURONTIN) 100 mg capsule TAKE 2 CAPSULES BY MOUTH AT BEDTIME, Normal      GLUCAGON EMERGENCY 1 MG injection Historical Med      Gvoke HypoPen 1-Pack 1 MG/0.2ML SOAJ Historical Med      hydrALAZINE (APRESOLINE) 25 mg tablet Take 1 tablet (25 mg total) by mouth 3 (three) times a day, Starting Fri 1/27/2023, Normal      hydrOXYzine HCL (ATARAX) 10 mg tablet Take 1 tablet (10 mg total) by mouth every 6 (six) hours as needed for itching or anxiety, Starting Wed 12/7/2022, Normal      Insulin Disposable Pump (Omnipod 5 G6 Intro, Gen 5,) KIT Starting Tue 1/17/2023, Historical Med      Insulin Disposable Pump (Omnipod 5 G6 Pod, Gen 5,) MISC Starting Fri 3/24/2023, Historical Med      insulin glargine (Basaglar KwikPen) 100 units/mL injection pen Inject 7 Units under the skin daily at bedtime, Starting Sat 7/16/2022, Normal      labetalol (NORMODYNE) 200 mg tablet TAKE 2 TABLETS BY MOUTH THREE TIMES DAILY, Normal      lisinopril (ZESTRIL) 40 mg tablet Take 80 mg by mouth daily, Starting Wed 2/9/2022, Historical Med      mupirocin (BACTROBAN) 2 % ointment Apply topically 3 (three) times a day Until lesion on right hand heals. , Starting Fri 10/14/2022, Normal      NIFEdipine (PROCARDIA XL) 90 mg 24 hr tablet Take 1 tablet (90 mg total) by mouth daily, Starting Sun 1/15/2023, Normal      NIFEdipine ER (ADALAT CC) 60 MG 24 hr tablet Take 1 tablet (60 mg total) by mouth 2 (two) times a day, Starting Tue 3/3/2020, No Print      NovoLOG 100 UNIT/ML injection Historical Med      NovoLOG FlexPen ReliOn 100 UNIT/ML injection pen Starting Tue 8/16/2022, Historical Med      ofloxacin (FLOXIN) 0.3 % otic solution Administer 10 drops to the right ear daily, Starting Tue 5/31/2022, Normal      ondansetron (ZOFRAN) 4 mg tablet Take 1 tablet (4 mg total) by mouth every 8 (eight) hours as needed for nausea or vomiting, Starting Wed 1/18/2023, Normal      Patiromer Sorbitex Calcium (VELTASSA PO) Take 5 g by mouth once a week, Historical Med      saccharomyces boulardii (FLORASTOR) 250 mg capsule Take 250 mg by mouth daily, Historical Med      SPS 15 GM/60ML suspension TAKE 60 ML BY MOUTH SINGLE DOSE FOR EMERGENCY USE DURING MISSED HEMODIALYSIS, Historical Med      torsemide (DEMADEX) 100 mg tablet Take 100 mg by mouth daily, Historical Med      traZODone (DESYREL) 50 mg tablet Take 0.5 tablets (25 mg total) by mouth daily at bedtime as needed for sleep, Starting Wed 12/7/2022, Normal      triamcinolone (KENALOG) 0.1 % ointment Apply topically 2 (two) times a day For up to 14 days on the itchy areas. Avoid groin, underarms and face. It is important to take at least 1 week break between uses. , Starting Fri 10/14/2022, Normal      Velphoro 500 MG CHEW CRUSH OR CHEW AND SWALLOW 2 TABLETS 3 TIMES A DAY WITH MEALS, Historical Med           No discharge procedures on file. PDMP Review       Value Time User    PDMP Reviewed  Yes 7/15/2022  4:20 AM Aliyah Castellanos MD           ED Provider  Attending physically available and evaluated Umm Price. I managed the patient along with the ED Attending.     Electronically Signed by         Wandy Mancuso MD  09/23/23 4630

## 2023-09-25 ENCOUNTER — TELEPHONE (OUTPATIENT)
Dept: INTERNAL MEDICINE CLINIC | Facility: CLINIC | Age: 38
End: 2023-09-25

## 2023-09-25 ENCOUNTER — TELEPHONE (OUTPATIENT)
Dept: ADMINISTRATIVE | Facility: OTHER | Age: 38
End: 2023-09-25

## 2023-09-25 NOTE — TELEPHONE ENCOUNTER
If patient was seen by his dentist for TMJ then management should be with dentist.  I saw him for follow up and had no complaints of tmj pain so I am unable to prescribe prescription pain meds. Recommend tylenol 500mg two tabs three times per day. There is also a TMJ clinic (physical therapy) that he can be referred to.

## 2023-09-25 NOTE — TELEPHONE ENCOUNTER
----- Message from Kailey Faust DO sent at 9/22/2023  3:56 PM EDT -----  Regarding: [facility] DM Eye  Please result DM eye note that is scanned in under Media dated 7/24/23 from Don. Thanks.     Dr. Arjun Clark

## 2023-09-25 NOTE — TELEPHONE ENCOUNTER
Pt's father called stating pt was seen in the ED on Friday and at his dentist on Saturday, and is requesting pain medication. States pt was diagnosed with TMJ.

## 2023-09-27 ENCOUNTER — TELEPHONE (OUTPATIENT)
Dept: PSYCHIATRY | Facility: CLINIC | Age: 38
End: 2023-09-27

## 2023-09-27 NOTE — TELEPHONE ENCOUNTER
Called and left message for patient to inform 10/12 930am virtual visit was cancelled due to provider being unavailable. Requested patient to call back. Please schedule in next available appt.

## 2023-10-02 NOTE — TELEPHONE ENCOUNTER
Upon review of the In Basket request we were able to locate, review, and update the patient chart as requested for Diabetic Eye Exam.    Any additional questions or concerns should be emailed to the Practice Liaisons via the appropriate education email address, please do not reply via In Basket.     Thank you  Meryl Schrader

## 2023-10-05 ENCOUNTER — IN-CLINIC DEVICE VISIT (OUTPATIENT)
Dept: CARDIOLOGY CLINIC | Facility: CLINIC | Age: 38
End: 2023-10-05

## 2023-10-05 DIAGNOSIS — I16.0 HYPERTENSIVE URGENCY: ICD-10-CM

## 2023-10-05 DIAGNOSIS — Z86.73 HISTORY OF LACUNAR CEREBROVASCULAR ACCIDENT (CVA): ICD-10-CM

## 2023-10-05 DIAGNOSIS — N17.0 ACUTE RENAL FAILURE WITH ACUTE TUBULAR NECROSIS SUPERIMPOSED ON STAGE 2 CHRONIC KIDNEY DISEASE: ICD-10-CM

## 2023-10-05 DIAGNOSIS — R80.1 PERSISTENT PROTEINURIA: ICD-10-CM

## 2023-10-05 DIAGNOSIS — R11.0 NAUSEA: ICD-10-CM

## 2023-10-05 DIAGNOSIS — G35 OPTIC NEURITIS DUE TO MULTIPLE SCLEROSIS (HCC): ICD-10-CM

## 2023-10-05 DIAGNOSIS — H53.30 BINOCULAR VISUAL DISTURBANCE: ICD-10-CM

## 2023-10-05 DIAGNOSIS — H46.9 OPTIC NEURITIS DUE TO MULTIPLE SCLEROSIS (HCC): ICD-10-CM

## 2023-10-05 DIAGNOSIS — E72.11 HYPERHOMOCYSTEINEMIA (HCC): ICD-10-CM

## 2023-10-05 DIAGNOSIS — E55.9 VITAMIN D DEFICIENCY: ICD-10-CM

## 2023-10-05 DIAGNOSIS — I63.9 CEREBROVASCULAR ACCIDENT (CVA), UNSPECIFIED MECHANISM (HCC): ICD-10-CM

## 2023-10-05 DIAGNOSIS — E83.39 HYPERPHOSPHATEMIA: ICD-10-CM

## 2023-10-05 DIAGNOSIS — Z95.818 PRESENCE OF OTHER CARDIAC IMPLANTS AND GRAFTS: Primary | ICD-10-CM

## 2023-10-05 DIAGNOSIS — I63.9 CEREBROVASCULAR ACCIDENT (CVA) OF LEFT PONTINE STRUCTURE (HCC): ICD-10-CM

## 2023-10-05 DIAGNOSIS — R79.89 AZOTEMIA: ICD-10-CM

## 2023-10-05 DIAGNOSIS — N18.30 ACUTE RENAL FAILURE WITH ACUTE TUBULAR NECROSIS SUPERIMPOSED ON STAGE 3 CHRONIC KIDNEY DISEASE (HCC): ICD-10-CM

## 2023-10-05 DIAGNOSIS — N17.0 ACUTE RENAL FAILURE WITH ACUTE TUBULAR NECROSIS SUPERIMPOSED ON STAGE 3 CHRONIC KIDNEY DISEASE (HCC): ICD-10-CM

## 2023-10-05 DIAGNOSIS — E10.21 TYPE 1 DIABETES MELLITUS WITH DIABETIC NEPHROPATHY, WITH LONG-TERM CURRENT USE OF INSULIN (HCC): Chronic | ICD-10-CM

## 2023-10-05 DIAGNOSIS — H51.0 BINOCULAR VISION DISORDER WITH CONJUGATE GAZE PALSY: ICD-10-CM

## 2023-10-05 DIAGNOSIS — H53.8 BLURRED VISION: ICD-10-CM

## 2023-10-05 DIAGNOSIS — N18.2 ACUTE RENAL FAILURE WITH ACUTE TUBULAR NECROSIS SUPERIMPOSED ON STAGE 2 CHRONIC KIDNEY DISEASE: ICD-10-CM

## 2023-10-05 PROCEDURE — 99024 POSTOP FOLLOW-UP VISIT: CPT | Performed by: INTERNAL MEDICINE

## 2023-10-05 RX ORDER — ATORVASTATIN CALCIUM 40 MG/1
40 TABLET, FILM COATED ORAL EVERY EVENING
Qty: 90 TABLET | Refills: 3 | Status: SHIPPED | OUTPATIENT
Start: 2023-10-05

## 2023-10-05 NOTE — PROGRESS NOTES
Results for orders placed or performed in visit on 10/05/23   Cardiac EP device report    Narrative    MDT LOOP/ [address] ILR EXPLANT ON 9/21/23: INCISION CLEAN AND DRY WITH EDGES APPROXIMATED; SUTURES REMOVED; WOUND CARE AND RESTRICTIONS REVIEWED WITH PATIENT.  GV

## 2023-10-18 ENCOUNTER — TELEPHONE (OUTPATIENT)
Dept: PSYCHIATRY | Facility: CLINIC | Age: 38
End: 2023-10-18

## 2023-10-18 NOTE — TELEPHONE ENCOUNTER
Pt called the office and left a voicemail needing to schedule an appt. Writer attempted to call the pt back but had to leave a message to call the office back.

## 2023-10-30 ENCOUNTER — OFFICE VISIT (OUTPATIENT)
Dept: PODIATRY | Facility: CLINIC | Age: 38
End: 2023-10-30
Payer: MEDICARE

## 2023-10-30 VITALS
HEIGHT: 71 IN | SYSTOLIC BLOOD PRESSURE: 151 MMHG | BODY MASS INDEX: 29.33 KG/M2 | DIASTOLIC BLOOD PRESSURE: 81 MMHG | HEART RATE: 75 BPM

## 2023-10-30 DIAGNOSIS — E10.42 DIABETIC POLYNEUROPATHY ASSOCIATED WITH TYPE 1 DIABETES MELLITUS (HCC): Primary | ICD-10-CM

## 2023-10-30 PROCEDURE — G0127 TRIM NAIL(S): HCPCS | Performed by: PODIATRIST

## 2023-10-30 NOTE — PROGRESS NOTES
Established patient with class findings presents for nail care. Vascular exam:  DP  0/4 bilateral; PT  0 4 bilateral   Dermatological exam:  Each toenail is thick and  dystrophic. Diagnosis:  Diabetes mellitus  Treatment: Trimmed multiple dystrophic toenails.      Nail removal    Date/Time: 10/30/2023 8:30 AM    Performed by: Tony Sims DPM  Authorized by: Tony Sims DPM    Nails trimmed:     Number of nails trimmed:  10

## 2023-11-24 ENCOUNTER — OFFICE VISIT (OUTPATIENT)
Dept: INTERNAL MEDICINE CLINIC | Facility: CLINIC | Age: 38
End: 2023-11-24
Payer: MEDICARE

## 2023-11-24 ENCOUNTER — APPOINTMENT (OUTPATIENT)
Dept: RADIOLOGY | Age: 38
End: 2023-11-24
Payer: MEDICARE

## 2023-11-24 VITALS
HEIGHT: 71 IN | BODY MASS INDEX: 28.98 KG/M2 | TEMPERATURE: 98.6 F | SYSTOLIC BLOOD PRESSURE: 148 MMHG | RESPIRATION RATE: 16 BRPM | OXYGEN SATURATION: 98 % | WEIGHT: 207 LBS | HEART RATE: 75 BPM | DIASTOLIC BLOOD PRESSURE: 88 MMHG

## 2023-11-24 DIAGNOSIS — M79.89 PAIN AND SWELLING OF TOE OF RIGHT FOOT: ICD-10-CM

## 2023-11-24 DIAGNOSIS — M79.674 PAIN AND SWELLING OF TOE OF RIGHT FOOT: Primary | ICD-10-CM

## 2023-11-24 DIAGNOSIS — Z91.81 STATUS POST FALL: ICD-10-CM

## 2023-11-24 DIAGNOSIS — S90.822A BLISTER OF LEFT HEEL, INITIAL ENCOUNTER: ICD-10-CM

## 2023-11-24 DIAGNOSIS — S81.811A SKIN TEAR OF RIGHT LOWER LEG WITHOUT COMPLICATION, INITIAL ENCOUNTER: ICD-10-CM

## 2023-11-24 DIAGNOSIS — M79.89 PAIN AND SWELLING OF TOE OF RIGHT FOOT: Primary | ICD-10-CM

## 2023-11-24 DIAGNOSIS — M79.674 PAIN AND SWELLING OF TOE OF RIGHT FOOT: ICD-10-CM

## 2023-11-24 PROCEDURE — 99213 OFFICE O/P EST LOW 20 MIN: CPT | Performed by: INTERNAL MEDICINE

## 2023-11-24 PROCEDURE — 73630 X-RAY EXAM OF FOOT: CPT

## 2023-11-24 NOTE — PROGRESS NOTES
Assessment/Plan:    Problem List Items Addressed This Visit    None  Visit Diagnoses     Pain and swelling of toe of right foot    -  Primary    -involving R big toe and 2nd toe  -obtain xray r/o fx, if positive, f/u Podiatry  -kayla tape toes  -keep foot elevated, apply ice    Relevant Orders    XR foot 3+ vw right    Status post fall        Relevant Orders    XR foot 3+ vw right    Blister of left heel, initial encounter        -hemorrhagic, secondary to trauma  -do not rupture    Skin tear of right lower leg without complication, initial encounter        -secondary to trauma  -no s/s infection  -wash soap and water, can apply vaseline          Subjective:      Patient ID: Marcia Rojas is a 34 y.o. male. HPI  He presents with his mother. His nieces were nephews were downstairs two days ago, typically he wears his shoes but was only wearing his socks and slipped down a few steps. He fell 1-2 steps and landed on his buttocks. However, both his feet have been sore, R>L. He noticed swelling R foot the next day. Swelling is about the same. He cannot put pressure on R big toe. He has LE neuropathy. He has been elevating his leg and applying ice.     The following portions of the patient's history were reviewed and updated as appropriate: allergies, current medications, past family history, past medical history, past social history, past surgical history and problem list.      Current Outpatient Medications:   •  aspirin (ECOTRIN LOW STRENGTH) 81 mg EC tablet, Take 81 mg by mouth daily, Disp: , Rfl:   •  atorvastatin (LIPITOR) 40 mg tablet, Take 1 tablet (40 mg total) by mouth every evening, Disp: 90 tablet, Rfl: 3  •  b complex vitamins capsule, Take 1 capsule by mouth daily before lunch , Disp: , Rfl:   •  B-D ULTRAFINE III SHORT PEN 31G X 8 MM MISC, USE 1 PEN NEEDLE 8 TIMES DAILY, Disp: 100 each, Rfl: 47  •  calcium acetate (PHOSLO) capsule, TAKE 3 CAPSULES BY MOUTH WITH MEALS, Disp: , Rfl:   • cinacalcet (SENSIPAR) 60 MG tablet, Take 120 mg by mouth daily 2 tab daily , Disp: , Rfl:   •  cloNIDine (CATAPRES-TTS-3) 0.3 mg/24 hr, 1 patch once a week, Disp: , Rfl:   •  clotrimazole (LOTRIMIN) 1 % cream, Apply to affected area 2 times daily for 2 weeks, Disp: 15 g, Rfl: 0  •  escitalopram (LEXAPRO) 20 mg tablet, Take 1 tablet (20 mg total) by mouth daily, Disp: 90 tablet, Rfl: 2  •  famotidine (PEPCID) 40 MG tablet, Take 1 tablet (40 mg total) by mouth in the morning, Disp: 90 tablet, Rfl: 1  •  gabapentin (NEURONTIN) 100 mg capsule, TAKE 2 CAPSULES BY MOUTH AT BEDTIME, Disp: 180 capsule, Rfl: 1  •  GLUCAGON EMERGENCY 1 MG injection, , Disp: , Rfl: 3  •  Gvoke HypoPen 1-Pack 1 MG/0.2ML SOAJ, , Disp: , Rfl:   •  hydrALAZINE (APRESOLINE) 25 mg tablet, Take 1 tablet (25 mg total) by mouth 3 (three) times a day, Disp: 270 tablet, Rfl: 0  •  hydrOXYzine HCL (ATARAX) 10 mg tablet, Take 1 tablet (10 mg total) by mouth every 6 (six) hours as needed for itching or anxiety, Disp: 30 tablet, Rfl: 3  •  Insulin Disposable Pump (Omnipod 5 G6 Intro, Gen 5,) KIT, , Disp: , Rfl:   •  Insulin Disposable Pump (Omnipod 5 G6 Pod, Gen 5,) MISC, , Disp: , Rfl:   •  insulin glargine (Basaglar KwikPen) 100 units/mL injection pen, Inject 7 Units under the skin daily at bedtime, Disp: 3 mL, Rfl: 0  •  labetalol (NORMODYNE) 200 mg tablet, TAKE 2 TABLETS BY MOUTH THREE TIMES DAILY, Disp: 180 tablet, Rfl: 0  •  lisinopril (ZESTRIL) 40 mg tablet, Take 80 mg by mouth daily, Disp: , Rfl:   •  mupirocin (BACTROBAN) 2 % ointment, Apply topically 3 (three) times a day Until lesion on right hand heals. , Disp: 30 g, Rfl: 4  •  NIFEdipine (PROCARDIA XL) 90 mg 24 hr tablet, Take 1 tablet (90 mg total) by mouth daily, Disp: 30 tablet, Rfl: 0  •  NIFEdipine ER (ADALAT CC) 60 MG 24 hr tablet, Take 1 tablet (60 mg total) by mouth 2 (two) times a day, Disp: 180 tablet, Rfl: 0  •  NovoLOG 100 UNIT/ML injection, , Disp: , Rfl:   •  NovoLOG FlexPen ReliOn 100 UNIT/ML injection pen, , Disp: , Rfl:   •  ofloxacin (FLOXIN) 0.3 % otic solution, Administer 10 drops to the right ear daily, Disp: 5 mL, Rfl: 0  •  ondansetron (ZOFRAN) 4 mg tablet, Take 1 tablet (4 mg total) by mouth every 8 (eight) hours as needed for nausea or vomiting, Disp: 90 tablet, Rfl: 0  •  Patiromer Sorbitex Calcium (VELTASSA PO), Take 5 g by mouth once a week, Disp: , Rfl:   •  saccharomyces boulardii (FLORASTOR) 250 mg capsule, Take 250 mg by mouth daily, Disp: , Rfl:   •  SPS 15 GM/60ML suspension, TAKE 60 ML BY MOUTH SINGLE DOSE FOR EMERGENCY USE DURING MISSED HEMODIALYSIS, Disp: , Rfl:   •  torsemide (DEMADEX) 100 mg tablet, Take 100 mg by mouth daily, Disp: , Rfl:   •  traZODone (DESYREL) 50 mg tablet, Take 0.5 tablets (25 mg total) by mouth daily at bedtime as needed for sleep, Disp: 30 tablet, Rfl: 3  •  triamcinolone (KENALOG) 0.1 % ointment, Apply topically 2 (two) times a day For up to 14 days on the itchy areas. Avoid groin, underarms and face. It is important to take at least 1 week break between uses. , Disp: 454 g, Rfl: 0  •  Velphoro 500 MG CHEW, CRUSH OR CHEW AND SWALLOW 2 TABLETS 3 TIMES A DAY WITH MEALS, Disp: , Rfl:     Review of Systems   Musculoskeletal:  Positive for arthralgias and gait problem. Skin:  Positive for color change and wound. Negative for rash. Neurological:  Positive for numbness. Objective:    /88 (BP Location: Left arm, Patient Position: Sitting, Cuff Size: Standard)   Pulse 75   Temp 98.6 °F (37 °C) (Tympanic Core)   Resp 16   Ht 5' 11" (1.803 m)   Wt 93.9 kg (207 lb)   SpO2 98%   BMI 28.87 kg/m²      Physical Exam  Vitals reviewed. Cardiovascular:      Pulses:           Dorsalis pedis pulses are 2+ on the right side and 2+ on the left side. Musculoskeletal:         General: Swelling (R foot) present. Right foot: Deformity (R big toe and 2nd digit swelling, ecchymoses and pain with palpation) present.    Feet:      Right foot: Toenail Condition: Right toenails are abnormally thick. Left foot:      Toenail Condition: Left toenails are abnormally thick. Comments: R distal calf skin tear  L heel hemorrhagic bullae  Skin:     Findings: Bruising (R first and 2nd toes) present. Neurological:      Mental Status: He is alert and oriented to person, place, and time.

## 2023-11-26 ENCOUNTER — HOSPITAL ENCOUNTER (INPATIENT)
Facility: HOSPITAL | Age: 38
LOS: 1 days | Discharge: HOME | DRG: 391 | End: 2023-11-27
Attending: EMERGENCY MEDICINE | Admitting: INTERNAL MEDICINE
Payer: MEDICARE

## 2023-11-26 ENCOUNTER — APPOINTMENT (EMERGENCY)
Dept: RADIOLOGY | Facility: HOSPITAL | Age: 38
DRG: 391 | End: 2023-11-26
Payer: MEDICARE

## 2023-11-26 DIAGNOSIS — R11.10 VOMITING AND DIARRHEA: Primary | ICD-10-CM

## 2023-11-26 DIAGNOSIS — N19 RENAL FAILURE: ICD-10-CM

## 2023-11-26 DIAGNOSIS — R19.7 VOMITING AND DIARRHEA: Primary | ICD-10-CM

## 2023-11-26 DIAGNOSIS — E10.9 TYPE 1 DIABETES MELLITUS ON INSULIN THERAPY (HCC): Primary | ICD-10-CM

## 2023-11-26 DIAGNOSIS — E86.0 DEHYDRATION: ICD-10-CM

## 2023-11-26 DIAGNOSIS — K52.9 GASTROENTERITIS: ICD-10-CM

## 2023-11-26 PROBLEM — G47.00 INSOMNIA: Status: ACTIVE | Noted: 2023-11-26

## 2023-11-26 PROBLEM — F41.9 ANXIETY: Status: ACTIVE | Noted: 2023-11-26

## 2023-11-26 PROBLEM — E78.5 HYPERLIPIDEMIA: Status: ACTIVE | Noted: 2023-11-26

## 2023-11-26 LAB
2HR DELTA HS TROPONIN: -1 NG/L
4HR DELTA HS TROPONIN: 6 NG/L
ALBUMIN SERPL BCP-MCNC: 4.3 G/DL (ref 3.5–5)
ALP SERPL-CCNC: 76 U/L (ref 34–104)
ALT SERPL W P-5'-P-CCNC: 12 U/L (ref 7–52)
ANION GAP SERPL CALCULATED.3IONS-SCNC: 15 MMOL/L
APTT PPP: 31 SECONDS (ref 23–37)
AST SERPL W P-5'-P-CCNC: 17 U/L (ref 13–39)
ATRIAL RATE: 84 BPM
ATRIAL RATE: 85 BPM
BASE EX.OXY STD BLDV CALC-SCNC: 78.3 % (ref 60–80)
BASE EXCESS BLDV CALC-SCNC: 0.1 MMOL/L
BASOPHILS # BLD AUTO: 0.02 THOUSANDS/ÂΜL (ref 0–0.1)
BASOPHILS NFR BLD AUTO: 0 % (ref 0–1)
BETA-HYDROXYBUTYRATE: 0.9 MMOL/L
BILIRUB SERPL-MCNC: 0.63 MG/DL (ref 0.2–1)
BUN SERPL-MCNC: 44 MG/DL (ref 5–25)
CALCIUM SERPL-MCNC: 8.2 MG/DL (ref 8.4–10.2)
CARDIAC TROPONIN I PNL SERPL HS: 17 NG/L
CARDIAC TROPONIN I PNL SERPL HS: 18 NG/L
CARDIAC TROPONIN I PNL SERPL HS: 24 NG/L
CHLORIDE SERPL-SCNC: 99 MMOL/L (ref 96–108)
CO2 SERPL-SCNC: 28 MMOL/L (ref 21–32)
CREAT SERPL-MCNC: 9.72 MG/DL (ref 0.6–1.3)
EOSINOPHIL # BLD AUTO: 0.38 THOUSAND/ÂΜL (ref 0–0.61)
EOSINOPHIL NFR BLD AUTO: 5 % (ref 0–6)
ERYTHROCYTE [DISTWIDTH] IN BLOOD BY AUTOMATED COUNT: 15.3 % (ref 11.6–15.1)
FLUAV RNA RESP QL NAA+PROBE: NEGATIVE
FLUBV RNA RESP QL NAA+PROBE: NEGATIVE
GFR SERPL CREATININE-BSD FRML MDRD: 5 ML/MIN/1.73SQ M
GLUCOSE SERPL-MCNC: 222 MG/DL (ref 65–140)
GLUCOSE SERPL-MCNC: 239 MG/DL (ref 65–140)
GLUCOSE SERPL-MCNC: 358 MG/DL (ref 65–140)
HCO3 BLDV-SCNC: 25.9 MMOL/L (ref 24–30)
HCT VFR BLD AUTO: 34.1 % (ref 36.5–49.3)
HGB BLD-MCNC: 11.1 G/DL (ref 12–17)
IMM GRANULOCYTES # BLD AUTO: 0.03 THOUSAND/UL (ref 0–0.2)
IMM GRANULOCYTES NFR BLD AUTO: 0 % (ref 0–2)
INR PPP: 0.98 (ref 0.84–1.19)
LACTATE SERPL-SCNC: 0.7 MMOL/L (ref 0.5–2)
LIPASE SERPL-CCNC: 34 U/L (ref 11–82)
LYMPHOCYTES # BLD AUTO: 0.2 THOUSANDS/ÂΜL (ref 0.6–4.47)
LYMPHOCYTES NFR BLD AUTO: 2 % (ref 14–44)
MCH RBC QN AUTO: 32.6 PG (ref 26.8–34.3)
MCHC RBC AUTO-ENTMCNC: 32.6 G/DL (ref 31.4–37.4)
MCV RBC AUTO: 100 FL (ref 82–98)
MONOCYTES # BLD AUTO: 0.43 THOUSAND/ÂΜL (ref 0.17–1.22)
MONOCYTES NFR BLD AUTO: 5 % (ref 4–12)
NEUTROPHILS # BLD AUTO: 7.32 THOUSANDS/ÂΜL (ref 1.85–7.62)
NEUTS SEG NFR BLD AUTO: 88 % (ref 43–75)
NRBC BLD AUTO-RTO: 0 /100 WBCS
O2 CT BLDV-SCNC: 12.5 ML/DL
P AXIS: 72 DEGREES
PCO2 BLDV: 46.8 MM HG (ref 42–50)
PH BLDV: 7.36 [PH] (ref 7.3–7.4)
PLATELET # BLD AUTO: 225 THOUSANDS/UL (ref 149–390)
PMV BLD AUTO: 10.5 FL (ref 8.9–12.7)
PO2 BLDV: 48.9 MM HG (ref 35–45)
POTASSIUM SERPL-SCNC: 4 MMOL/L (ref 3.5–5.3)
PR INTERVAL: 166 MS
PR INTERVAL: 172 MS
PROT SERPL-MCNC: 6.6 G/DL (ref 6.4–8.4)
PROTHROMBIN TIME: 13.6 SECONDS (ref 11.6–14.5)
QRS AXIS: 53 DEGREES
QRS AXIS: 63 DEGREES
QRSD INTERVAL: 68 MS
QRSD INTERVAL: 68 MS
QT INTERVAL: 426 MS
QT INTERVAL: 436 MS
QTC INTERVAL: 506 MS
QTC INTERVAL: 515 MS
RBC # BLD AUTO: 3.4 MILLION/UL (ref 3.88–5.62)
RSV RNA RESP QL NAA+PROBE: NEGATIVE
SARS-COV-2 RNA RESP QL NAA+PROBE: NEGATIVE
SODIUM SERPL-SCNC: 142 MMOL/L (ref 135–147)
T WAVE AXIS: -80 DEGREES
T WAVE AXIS: 46 DEGREES
VENTRICULAR RATE: 84 BPM
VENTRICULAR RATE: 85 BPM
WBC # BLD AUTO: 8.38 THOUSAND/UL (ref 4.31–10.16)

## 2023-11-26 PROCEDURE — 85025 COMPLETE CBC W/AUTO DIFF WBC: CPT | Performed by: EMERGENCY MEDICINE

## 2023-11-26 PROCEDURE — 71045 X-RAY EXAM CHEST 1 VIEW: CPT

## 2023-11-26 PROCEDURE — 99284 EMERGENCY DEPT VISIT MOD MDM: CPT

## 2023-11-26 PROCEDURE — 80053 COMPREHEN METABOLIC PANEL: CPT | Performed by: EMERGENCY MEDICINE

## 2023-11-26 PROCEDURE — 99285 EMERGENCY DEPT VISIT HI MDM: CPT | Performed by: PHYSICIAN ASSISTANT

## 2023-11-26 PROCEDURE — 36415 COLL VENOUS BLD VENIPUNCTURE: CPT

## 2023-11-26 PROCEDURE — 83605 ASSAY OF LACTIC ACID: CPT | Performed by: PHYSICIAN ASSISTANT

## 2023-11-26 PROCEDURE — 85730 THROMBOPLASTIN TIME PARTIAL: CPT | Performed by: PHYSICIAN ASSISTANT

## 2023-11-26 PROCEDURE — 87040 BLOOD CULTURE FOR BACTERIA: CPT | Performed by: PHYSICIAN ASSISTANT

## 2023-11-26 PROCEDURE — 0241U HB NFCT DS VIR RESP RNA 4 TRGT: CPT | Performed by: PHYSICIAN ASSISTANT

## 2023-11-26 PROCEDURE — 96375 TX/PRO/DX INJ NEW DRUG ADDON: CPT

## 2023-11-26 PROCEDURE — 83735 ASSAY OF MAGNESIUM: CPT | Performed by: HOSPITALIST

## 2023-11-26 PROCEDURE — 96374 THER/PROPH/DIAG INJ IV PUSH: CPT

## 2023-11-26 PROCEDURE — 84484 ASSAY OF TROPONIN QUANT: CPT | Performed by: PHYSICIAN ASSISTANT

## 2023-11-26 PROCEDURE — 93005 ELECTROCARDIOGRAM TRACING: CPT

## 2023-11-26 PROCEDURE — 82948 REAGENT STRIP/BLOOD GLUCOSE: CPT

## 2023-11-26 PROCEDURE — 82805 BLOOD GASES W/O2 SATURATION: CPT | Performed by: PHYSICIAN ASSISTANT

## 2023-11-26 PROCEDURE — 99223 1ST HOSP IP/OBS HIGH 75: CPT | Performed by: INTERNAL MEDICINE

## 2023-11-26 PROCEDURE — 83690 ASSAY OF LIPASE: CPT | Performed by: EMERGENCY MEDICINE

## 2023-11-26 PROCEDURE — 87081 CULTURE SCREEN ONLY: CPT | Performed by: INTERNAL MEDICINE

## 2023-11-26 PROCEDURE — 85610 PROTHROMBIN TIME: CPT | Performed by: PHYSICIAN ASSISTANT

## 2023-11-26 PROCEDURE — 82010 KETONE BODYS QUAN: CPT | Performed by: PHYSICIAN ASSISTANT

## 2023-11-26 PROCEDURE — 84100 ASSAY OF PHOSPHORUS: CPT | Performed by: HOSPITALIST

## 2023-11-26 PROCEDURE — 96361 HYDRATE IV INFUSION ADD-ON: CPT

## 2023-11-26 RX ORDER — LABETALOL HYDROCHLORIDE 5 MG/ML
10 INJECTION, SOLUTION INTRAVENOUS EVERY 4 HOURS PRN
Status: DISCONTINUED | OUTPATIENT
Start: 2023-11-26 | End: 2023-11-27 | Stop reason: HOSPADM

## 2023-11-26 RX ORDER — SODIUM CHLORIDE 9 MG/ML
75 INJECTION, SOLUTION INTRAVENOUS CONTINUOUS
Status: DISCONTINUED | OUTPATIENT
Start: 2023-11-26 | End: 2023-11-26

## 2023-11-26 RX ORDER — GABAPENTIN 100 MG/1
200 CAPSULE ORAL
Status: DISCONTINUED | OUTPATIENT
Start: 2023-11-26 | End: 2023-11-27 | Stop reason: HOSPADM

## 2023-11-26 RX ORDER — TRAZODONE HYDROCHLORIDE 50 MG/1
25 TABLET ORAL
Status: DISCONTINUED | OUTPATIENT
Start: 2023-11-26 | End: 2023-11-27 | Stop reason: HOSPADM

## 2023-11-26 RX ORDER — ONDANSETRON 2 MG/ML
1 INJECTION INTRAMUSCULAR; INTRAVENOUS ONCE
Status: COMPLETED | OUTPATIENT
Start: 2023-11-26 | End: 2023-11-26

## 2023-11-26 RX ORDER — CINACALCET 30 MG/1
120 TABLET, FILM COATED ORAL DAILY
Status: DISCONTINUED | OUTPATIENT
Start: 2023-11-27 | End: 2023-11-27 | Stop reason: HOSPADM

## 2023-11-26 RX ORDER — TORSEMIDE 100 MG/1
100 TABLET ORAL DAILY
Status: DISCONTINUED | OUTPATIENT
Start: 2023-11-27 | End: 2023-11-27

## 2023-11-26 RX ORDER — DEXTROSE AND SODIUM CHLORIDE 5; .9 G/100ML; G/100ML
75 INJECTION, SOLUTION INTRAVENOUS CONTINUOUS
Status: DISCONTINUED | OUTPATIENT
Start: 2023-11-26 | End: 2023-11-26

## 2023-11-26 RX ORDER — HYDRALAZINE HYDROCHLORIDE 25 MG/1
25 TABLET, FILM COATED ORAL 3 TIMES DAILY
Status: DISCONTINUED | OUTPATIENT
Start: 2023-11-26 | End: 2023-11-27 | Stop reason: HOSPADM

## 2023-11-26 RX ORDER — INSULIN PMP CART,AUT,G6/7,CNTR
EACH SUBCUTANEOUS CONTINUOUS
Status: DISCONTINUED | OUTPATIENT
Start: 2023-11-26 | End: 2023-11-26

## 2023-11-26 RX ORDER — CALCIUM ACETATE 667 MG/1
2001 CAPSULE ORAL
Status: DISCONTINUED | OUTPATIENT
Start: 2023-11-26 | End: 2023-11-27 | Stop reason: HOSPADM

## 2023-11-26 RX ORDER — ONDANSETRON 2 MG/ML
4 INJECTION INTRAMUSCULAR; INTRAVENOUS EVERY 6 HOURS PRN
Status: DISCONTINUED | OUTPATIENT
Start: 2023-11-26 | End: 2023-11-26

## 2023-11-26 RX ORDER — HYDROXYZINE HYDROCHLORIDE 10 MG/1
10 TABLET, FILM COATED ORAL EVERY 6 HOURS PRN
Status: DISCONTINUED | OUTPATIENT
Start: 2023-11-26 | End: 2023-11-27 | Stop reason: HOSPADM

## 2023-11-26 RX ORDER — LABETALOL HYDROCHLORIDE 5 MG/ML
10 INJECTION, SOLUTION INTRAVENOUS ONCE
Status: COMPLETED | OUTPATIENT
Start: 2023-11-26 | End: 2023-11-26

## 2023-11-26 RX ORDER — CLONIDINE 0.3 MG/24H
0.3 PATCH, EXTENDED RELEASE TRANSDERMAL WEEKLY
Status: DISCONTINUED | OUTPATIENT
Start: 2023-11-26 | End: 2023-11-27 | Stop reason: HOSPADM

## 2023-11-26 RX ORDER — FAMOTIDINE 20 MG/1
10 TABLET, FILM COATED ORAL DAILY
Status: DISCONTINUED | OUTPATIENT
Start: 2023-11-27 | End: 2023-11-27 | Stop reason: HOSPADM

## 2023-11-26 RX ORDER — LISINOPRIL 20 MG/1
80 TABLET ORAL DAILY
Status: DISCONTINUED | OUTPATIENT
Start: 2023-11-26 | End: 2023-11-27 | Stop reason: HOSPADM

## 2023-11-26 RX ORDER — SODIUM CHLORIDE 9 MG/ML
75 INJECTION, SOLUTION INTRAVENOUS CONTINUOUS
Status: DISCONTINUED | OUTPATIENT
Start: 2023-11-26 | End: 2023-11-27

## 2023-11-26 RX ORDER — ACETAMINOPHEN 325 MG/1
650 TABLET ORAL EVERY 6 HOURS PRN
Status: DISCONTINUED | OUTPATIENT
Start: 2023-11-26 | End: 2023-11-27 | Stop reason: HOSPADM

## 2023-11-26 RX ORDER — ATORVASTATIN CALCIUM 40 MG/1
40 TABLET, FILM COATED ORAL EVERY EVENING
Status: DISCONTINUED | OUTPATIENT
Start: 2023-11-26 | End: 2023-11-27 | Stop reason: HOSPADM

## 2023-11-26 RX ORDER — LISINOPRIL 10 MG/1
40 TABLET ORAL DAILY
Status: DISCONTINUED | OUTPATIENT
Start: 2023-11-27 | End: 2023-11-26

## 2023-11-26 RX ORDER — LABETALOL 100 MG/1
400 TABLET, FILM COATED ORAL 3 TIMES DAILY
Status: DISCONTINUED | OUTPATIENT
Start: 2023-11-26 | End: 2023-11-27 | Stop reason: HOSPADM

## 2023-11-26 RX ORDER — LABETALOL HYDROCHLORIDE 5 MG/ML
10 INJECTION, SOLUTION INTRAVENOUS EVERY 4 HOURS PRN
Status: DISCONTINUED | OUTPATIENT
Start: 2023-11-26 | End: 2023-11-26

## 2023-11-26 RX ORDER — NIFEDIPINE 30 MG/1
90 TABLET, EXTENDED RELEASE ORAL DAILY
Status: DISCONTINUED | OUTPATIENT
Start: 2023-11-27 | End: 2023-11-27 | Stop reason: HOSPADM

## 2023-11-26 RX ORDER — ONDANSETRON 2 MG/ML
4 INJECTION INTRAMUSCULAR; INTRAVENOUS ONCE
Status: COMPLETED | OUTPATIENT
Start: 2023-11-26 | End: 2023-11-26

## 2023-11-26 RX ORDER — ONDANSETRON 2 MG/ML
INJECTION INTRAMUSCULAR; INTRAVENOUS
Status: COMPLETED
Start: 2023-11-26 | End: 2023-11-26

## 2023-11-26 RX ORDER — INSULIN LISPRO 100 [IU]/ML
1-6 INJECTION, SOLUTION INTRAVENOUS; SUBCUTANEOUS
Status: DISCONTINUED | OUTPATIENT
Start: 2023-11-26 | End: 2023-11-27 | Stop reason: HOSPADM

## 2023-11-26 RX ORDER — ESCITALOPRAM OXALATE 20 MG/1
20 TABLET ORAL DAILY
Status: DISCONTINUED | OUTPATIENT
Start: 2023-11-27 | End: 2023-11-27 | Stop reason: HOSPADM

## 2023-11-26 RX ORDER — FAMOTIDINE 20 MG/1
10 TABLET, FILM COATED ORAL DAILY
Status: DISCONTINUED | OUTPATIENT
Start: 2023-11-26 | End: 2023-11-26

## 2023-11-26 RX ORDER — INSULIN GLARGINE 100 [IU]/ML
5 INJECTION, SOLUTION SUBCUTANEOUS
Status: DISCONTINUED | OUTPATIENT
Start: 2023-11-26 | End: 2023-11-27

## 2023-11-26 RX ORDER — INSULIN LISPRO 100 [IU]/ML
5 INJECTION, SOLUTION INTRAVENOUS; SUBCUTANEOUS
Status: DISCONTINUED | OUTPATIENT
Start: 2023-11-26 | End: 2023-11-26

## 2023-11-26 RX ADMIN — LISINOPRIL 80 MG: 20 TABLET ORAL at 16:33

## 2023-11-26 RX ADMIN — INSULIN LISPRO 2 UNITS: 100 INJECTION, SOLUTION INTRAVENOUS; SUBCUTANEOUS at 17:04

## 2023-11-26 RX ADMIN — INSULIN LISPRO 2.3 UNITS: 100 INJECTION, SOLUTION INTRAVENOUS; SUBCUTANEOUS at 21:22

## 2023-11-26 RX ADMIN — ONDANSETRON 4 MG: 2 INJECTION INTRAMUSCULAR; INTRAVENOUS at 13:48

## 2023-11-26 RX ADMIN — HYDRALAZINE HYDROCHLORIDE 25 MG: 25 TABLET, FILM COATED ORAL at 21:48

## 2023-11-26 RX ADMIN — SODIUM CHLORIDE 75 ML/HR: 0.9 INJECTION, SOLUTION INTRAVENOUS at 14:33

## 2023-11-26 RX ADMIN — Medication 10 MG: at 15:49

## 2023-11-26 RX ADMIN — SODIUM CHLORIDE 75 ML/HR: 0.9 INJECTION, SOLUTION INTRAVENOUS at 20:56

## 2023-11-26 RX ADMIN — DEXTROSE AND SODIUM CHLORIDE 75 ML/HR: 5; .9 INJECTION, SOLUTION INTRAVENOUS at 16:50

## 2023-11-26 RX ADMIN — ATORVASTATIN CALCIUM 40 MG: 40 TABLET, FILM COATED ORAL at 17:01

## 2023-11-26 RX ADMIN — LABETALOL HYDROCHLORIDE 400 MG: 100 TABLET, FILM COATED ORAL at 16:32

## 2023-11-26 RX ADMIN — Medication 10 MG: at 14:44

## 2023-11-26 RX ADMIN — SODIUM CHLORIDE 1000 ML: 0.9 INJECTION, SOLUTION INTRAVENOUS at 13:50

## 2023-11-26 RX ADMIN — LABETALOL HYDROCHLORIDE 400 MG: 100 TABLET, FILM COATED ORAL at 21:48

## 2023-11-26 RX ADMIN — CLONIDINE 0.3 MG: 0.3 PATCH TRANSDERMAL at 21:48

## 2023-11-26 RX ADMIN — INSULIN LISPRO 8 UNITS: 100 INJECTION, SOLUTION INTRAVENOUS; SUBCUTANEOUS at 19:50

## 2023-11-26 RX ADMIN — FAMOTIDINE 10 MG: 20 TABLET, FILM COATED ORAL at 16:52

## 2023-11-26 RX ADMIN — HYDRALAZINE HYDROCHLORIDE 25 MG: 25 TABLET, FILM COATED ORAL at 16:32

## 2023-11-26 RX ADMIN — GABAPENTIN 200 MG: 100 CAPSULE ORAL at 21:48

## 2023-11-26 NOTE — ASSESSMENT & PLAN NOTE
- HbgA1c 6.0% on 10/28/23  - Currently on insulin pump, unsure of the settings    Plan:  Continue home pump and monitoring

## 2023-11-26 NOTE — ASSESSMENT & PLAN NOTE
- Upon presentation, bp 216/105  - s/p 2 x IV labetalol 10 mg in the ED  - Home meds: Clonidine patch weekly, hydralazine 25 mg TID, labetalol 400 mg TID, lisinopril 80 mg qd, nifedipine 90 mg qd    Plan:  - Continue home hydralazine, labetalol, lisinopril, nifedipine  - Patient already has clonidine patch in place  - Continue to monitor blood pressure  - IV labetalol 10 mg prn sbp > 200

## 2023-11-26 NOTE — ASSESSMENT & PLAN NOTE
- N/V/D for the past 24 hours. No abdominal pain. Afebrile, no leukocytosis. Likely viral etiology  - 15 episodes of nonbilious, nonbloody vomiting episodes  - 5 episodes of loose stools  - Multiple family members with similar sx - mother and toddler nieces/nephews    Plan:  - D5 NS at 76 cc/hr for 12 hours  - Clear liquid diet, advance as tolerated  - IM Tigan due to QTc prolongation  - Bacterial stool enteric panel  - If patient develops abdominal pain, get CT abdomen   - No recent hospitalization, antibiotic use, or Hx of C. difficile.  Will hold off on C. difficile testing at this time

## 2023-11-26 NOTE — ASSESSMENT & PLAN NOTE
- MWF dialysis schedule, last session on Friday 11/24  - BUN 44, Cr 9.72 (baseline 7-8)  - Home med: PhosLo, Sensipar, Torsemide 100 mg qd  - Does not make urine at baseline  Completed dialysis today    Plan:  HOLD torsemide as patient is anuric  Discharge to home  Continue home PhosLo, Sensipar

## 2023-11-26 NOTE — H&P
[id number]  H&P  Name: Janice Corey 34 y.o. male I MRN: [id number]  Unit/Bed#: S -01 I Date of Admission: 11/26/2023   Date of Service: 11/26/2023 I Hospital Day: 0      Assessment/Plan   * Gastroenteritis  Assessment & Plan  - N/V/D for the past 24 hours. No abdominal pain. Afebrile, no leukocytosis. Likely viral etiology  - 15 episodes of nonbilious, nonbloody vomiting episodes  - 5 episodes of loose stools. No hematochezia  - Multiple family members with similar sx - mother and toddler nieces/nephews    Plan:  - D5 NS at 76 cc/hr for 12 hours  - Clear liquid diet, advance as tolerated  - IM Tigan due to QTc prolongation  - Bacterial stool enteric panel  - If patient develops abdominal pain, get CT abdomen   - No recent hospitalization, antibiotic use, or Hx of C. difficile.  Will hold off on C. difficile testing at this time    End stage renal disease on dialysis [facility])  Assessment & Plan  - MWF dialysis schedule, last session on Friday 11/24  - BUN 44, Cr 9.72 (baseline 7-8)  - Home med: PhosLo, Sensipar, Torsemide 100 mg qd  - Does not make urine at baseline    Plan:  - Nephrology consulted for hemodialysis tomorrow  - Continue home PhosLo, Sensipar, and torsemide    Hypertension  Assessment & Plan  - Upon presentation, bp 216/105  - s/p 2 x IV labetalol 10 mg in the ED  - Home meds: Clonidine patch weekly, hydralazine 25 mg TID, labetalol 400 mg TID, lisinopril 80 mg qd, nifedipine 90 mg qd    Plan:  - Continue home hydralazine, labetalol, lisinopril, nifedipine  - Patient already has clonidine patch in place  - Continue to monitor blood pressure  - IV labetalol 10 mg prn sbp > 200    Type 1 diabetes mellitus on insulin therapy ([address])  Assessment & Plan  - HbgA1c 6.0% on 10/28/23  - Currently on insulin pump, unsure of the settings    Plan:  - Stop insulin pump  - Lantus 5 units qhs  - SSI  - Hypoglycemia protocol  - QID blood glucose checks  - Continue to monitor blood sugars since he is on D5 NS    Hyperlipidemia  Assessment & Plan  - Continue home Atorvastatin 40 mg qd    GERD (gastroesophageal reflux disease)  Assessment & Plan  - Continue famotidine 40 mg qd    Plan:  - Per pharmacy, continue famotidine 10 mg for renal dosing    Anxiety  Assessment & Plan  - Continue home Lexapro 20 mg qd, Atarax 10 mg q6 prn    Insomnia  Assessment & Plan  - Continue home trazodone 25 mg qhs prn    Diabetic neuropathy (HCC)  Assessment & Plan  - Continue home gabapentin 200 mg qhs       VTE Pharmacologic Prophylaxis: VTE Score: 2 Low Risk (Score 0-2) - Encourage Ambulation. Code Status: Level 1 - Full Code   Discussion with family: Updated  (father) at bedside. Anticipated Length of Stay: Patient will be admitted on an observation basis with an anticipated length of stay of less than 2 midnights secondary to gastroenteritis. Chief Complaint: Nausea, vomiting, diarrhea    History of Present Illness:  Jeison Tolliver is a 34 y.o. male with a PMH of HTN, HLD, IDDM, ESRD on MWF dialysis, GERD, diabetic neuropathy, anxiety, and insomnia who presents with acute onset of N/V/D for the past 24 hours. He reports having 15 episodes of nonbilious, nonbloody emesis as well as 5 episodes of loose stools. He notes that there are multiple family members with similar symptoms including mother and toddler nieces/nephews. He ate a normal dinner last night. Family members also ate same dinner, did not have symptoms. He does not have any abdominal pain. No fever, hematemesis, hematochezia. No recent hospitalization, recent antibiotic use, or Hx of C diff. On admission, his blood pressure was 216/105. He was unable to take any of his antihypertensive medications today due to nausea. Afebrile, no leukocytosis. BUN 44, Cr 9.72. Last dialyzed 2 days ago. Lactic acid, troponin, flu/RSV/COVID-negative. VBG normal. Blood sugar 222.  Mild elevation in beta hydroxybutyrate 0.9, no concern for DKA. CXR without any acute cardiopulmonary abnormalities. In the ED, he received 2 x IV labetalol 10 mg pushes and 1 L of NS. Review of Systems:  Review of Systems   Constitutional:  Negative for chills and fever. HENT:  Negative for ear pain and sore throat. Eyes:  Negative for pain and visual disturbance. Respiratory:  Negative for cough and shortness of breath. Cardiovascular:  Negative for chest pain and palpitations. Gastrointestinal:  Positive for diarrhea, nausea and vomiting. Negative for abdominal pain, blood in stool, constipation and rectal pain. Genitourinary:  Negative for dysuria and hematuria. Musculoskeletal:  Negative for arthralgias and back pain. Skin:  Negative for color change and rash. Neurological:  Negative for seizures and syncope. All other systems reviewed and are negative. Past Medical and Surgical History:   Past Medical History:   Diagnosis Date    Acute kidney injury ([address])     Ambulates with cane     Anuria     Anxiety     Cellulitis of right elbow 03/31/2021    Chronic kidney disease     Depression     Diabetes mellitus ([address] )     Diarrhea     Emesis 10/24/2020    End stage renal disease ([address]) 02/11/2018    Formatting of this note might be different from the original. Last Assessment & Plan:  Secondary to DM. On nightly PD. Followed by Nephro.   Patient considering transplant for kidney and pancreas through [address] of this note might be different from the original. Last Assessment & Plan:  Formatting of this note might be different from the original. Lab Results  Component Value Date   EGFR     Esophagitis 7/21/2015    Falls     Gastroparesis     GERD (gastroesophageal reflux disease)     History of shingles 2010    History of transfusion 02/2018    no adverse reaction    Hyperlipidemia     Hyperphosphatemia     Hypertension     Hypoglycemia 7/15/2022    Itching     Mastoiditis of right side 07/15/2022    Muscle weakness     general unsteadiness Obesity (BMI 30.0-34.9) 09/09/2019    Orthostatic hypotension 10/25/2020    Peripheral polyneuropathy 11/20/2019    PONV (postoperative nausea and vomiting) 01/26/2018    Protein-calorie malnutrition ([address]) 11/23/2020    Recurrent peritonitis ([address]) due to peritoneal dialysis catheter 07/31/2020    Retinopathy     Seizures ([address])     early 2020 - one time    Skin abnormality     some dime size areas where skin was scratched from itching    Spontaneous bacterial peritonitis ([address]) 10/19/2020    Squamous cell skin cancer     left temple    Stroke ([address])     x2 - off balance/no driving/fatigue    Swelling of both lower extremities     Traumatic onycholysis 7/21/2022    Vomiting     Wears glasses        Past Surgical History:   Procedure Laterality Date    CARDIAC ELECTROPHYSIOLOGY PROCEDURE N/A 9/21/2023    Procedure: Cardiac loop recorder explant;  Surgeon: Fidelia Mesa MD;  Location: [facility]; Service: Cardiology    CARDIAC LOOP RECORDER  05/2018    COLONOSCOPY      EGD      EYE SURGERY Right     IR AV FISTULAGRAM/GRAFTOGRAM  02/23/2021    IR TUNNELED CENTRAL LINE PLACEMENT  02/16/2021    IR TUNNELED DIALYSIS CATHETER PLACEMENT  11/18/2020    IR TUNNELED DIALYSIS CATHETER REMOVAL  02/12/2021    IR TUNNELED DIALYSIS CATHETER REMOVAL  03/11/2021    MOHS SURGERY Left 12/14/2022    Left temple with Dr. Jae Muhammad N/A 08/27/2018    Procedure: UNROOF PD CATHETER;  Surgeon: Sebastian De La Rosa DO;  Location: [facility];  Service: General    WI ARTERIOVENOUS ANASTOMOSIS OPEN DIRECT Left 11/09/2020    Procedure: CREATION FISTULA  ARTERIOVENOUS (AV) - LEFT WRIST;  Surgeon: Leobardo Mcallister MD;  Location: [facility];  Service: Vascular    WI ESOPHAGOGASTRODUODENOSCOPY TRANSORAL DIAGNOSTIC N/A 04/18/2019    Procedure: ESOPHAGOGASTRODUODENOSCOPY (EGD); Surgeon: Chaya Byole MD;  Location: [facility] GI [facility];   Service: Gastroenterology    WI LAPS INSERTION TUNNELED INTRAPERITONEAL CATHETER N/A 08/06/2018    Procedure: LAPAROSCOPIC PD CATHETER PLACEMENT;  Surgeon: Cory Cole DO;  Location: [facility];  Service: General    MD REMOVAL TUNNELED INTRAPERITONEAL CATHETER N/A 11/18/2020    Procedure: REMOVAL CATHETER PERITONEAL DIALYSIS;  Surgeon: Alyx Diop MD;  Location: [facility];  Service: General    TONSILLECTOMY      UPPER GASTROINTESTINAL ENDOSCOPY         Meds/Allergies:  Prior to Admission medications    Medication Sig Start Date End Date Taking?  Authorizing Provider   aspirin (ECOTRIN LOW STRENGTH) 81 mg EC tablet Take 81 mg by mouth daily   Yes Historical Provider, MD   atorvastatin (LIPITOR) 40 mg tablet Take 1 tablet (40 mg total) by mouth every evening 10/5/23  Yes 100 Joshua Dunaway DO   b complex vitamins capsule Take 1 capsule by mouth daily before lunch    Yes Historical Provider, MD   calcium acetate (PHOSLO) capsule TAKE 3 CAPSULES BY MOUTH WITH MEALS 3/12/21  Yes Historical Provider, MD   cinacalcet (SENSIPAR) 60 MG tablet Take 120 mg by mouth daily 2 tab daily    Yes Historical Provider, MD   cloNIDine (CATAPRES-TTS-3) 0.3 mg/24 hr 1 patch once a week   Yes Historical Provider, MD   escitalopram (LEXAPRO) 20 mg tablet Take 1 tablet (20 mg total) by mouth daily 3/15/23  Yes Ovidio Morales MD   famotidine (PEPCID) 40 MG tablet Take 1 tablet (40 mg total) by mouth in the morning 6/28/23  Yes 100 Joshua Dunaway DO   gabapentin (NEURONTIN) 100 mg capsule TAKE 2 CAPSULES BY MOUTH AT BEDTIME 7/3/23  Yes Dania Sher DO   hydrALAZINE (APRESOLINE) 25 mg tablet Take 1 tablet (25 mg total) by mouth 3 (three) times a day 1/27/23  Yes Dania Sher DO   hydrOXYzine HCL (ATARAX) 10 mg tablet Take 1 tablet (10 mg total) by mouth every 6 (six) hours as needed for itching or anxiety 12/7/22  Yes Ovidio Morales MD   labetalol (NORMODYNE) 200 mg tablet TAKE 2 TABLETS BY MOUTH THREE TIMES DAILY 2/21/22  Yes DEISI Alejandre   lisinopril (ZESTRIL) 40 mg tablet Take 80 mg by mouth daily 2/9/22  Yes Historical Provider, MD   NIFEdipine (PROCARDIA XL) 90 mg 24 hr tablet Take 1 tablet (90 mg total) by mouth daily 1/15/23  Yes DEISI Pérez   ondansetron [facility]) 4 mg tablet Take 1 tablet (4 mg total) by mouth every 8 (eight) hours as needed for nausea or vomiting 1/18/23  Yes Dania Sher DO   saccharomyces boulardii (FLORASTOR) 250 mg capsule Take 250 mg by mouth daily   Yes Historical Provider, MD   torsemide (DEMADEX) 100 mg tablet Take 100 mg by mouth daily   Yes Historical Provider, MD   traZODone (DESYREL) 50 mg tablet Take 0.5 tablets (25 mg total) by mouth daily at bedtime as needed for sleep 12/7/22  Yes Debora Fontaine MD   B-D ULTRAFINE III SHORT PEN 31G X 8 MM MISC USE 1 PEN NEEDLE 8 TIMES DAILY 9/24/19   Dania Sher DO   clotrimazole (LOTRIMIN) 1 % cream Apply to affected area 2 times daily for 2 weeks  Patient not taking: Reported on 11/26/2023 11/30/22   Lluvia Torres PA-C   GLUCAGON EMERGENCY 1 MG injection  7/24/19   Historical Provider, MD Jose Alfredo Mannen 1-Pack 1 MG/0.2ML SOAJ  8/10/22   Historical Provider, MD   Insulin Disposable Pump (Omnipod 5 G6 Intro, Gen 5,) KIT  1/17/23   Historical Provider, MD   Insulin Disposable Pump (Omnipod 5 G6 Pod, Gen 5,) MISC  3/24/23   Historical Provider, MD   insulin glargine (Basaglar KwikPen) 100 units/mL injection pen Inject 7 Units under the skin daily at bedtime 7/16/22   Armando Holt MD   mupirocin (BACTROBAN) 2 % ointment Apply topically 3 (three) times a day Until lesion on right hand heals.  10/14/22   Arletta Councilman, MD   NIFEdipine ER (ADALAT CC) 60 MG 24 hr tablet Take 1 tablet (60 mg total) by mouth 2 (two) times a day  Patient not taking: Reported on 11/26/2023 3/3/20   Daily Garcia MD   NovoLOG 100 UNIT/ML injection  1/5/23   Historical Provider, MD   NovoLOG FlexPen ReliOn 100 UNIT/ML injection pen  8/16/22   Historical Provider, MD   ofloxacin (FLOXIN) 0.3 % otic solution Administer 10 drops to the right ear daily 22   Vern Ramirez PA-C   Patiromer Sorbitex Calcium (VELTASSA PO) Take 5 g by mouth once a week    Historical Provider, MD   SPS 15 GM/60ML suspension TAKE 60 ML BY MOUTH SINGLE DOSE FOR EMERGENCY USE DURING MISSED HEMODIALYSIS 22   Historical Provider, MD   triamcinolone (KENALOG) 0.1 % ointment Apply topically 2 (two) times a day For up to 14 days on the itchy areas. Avoid groin, underarms and face. It is important to take at least 1 week break between uses. 10/14/22   Arletta Councilman, MD   Velphoro 500 MG CHEW CRUSH OR CHEW AND SWALLOW 2 TABLETS 3 TIMES A DAY WITH MEALS 23   Historical Provider, MD     I have reviewed home medications with patient personally. Allergies:    Allergies   Allergen Reactions    Sulfa Antibiotics Rash       Social History:  Marital Status: Single   Occupation: Unknown  Patient Pre-hospital Living Situation: Home  Patient Pre-hospital Level of Mobility: walks  Patient Pre-hospital Diet Restrictions: Renal  Substance Use History:   Social History     Substance and Sexual Activity   Alcohol Use Not Currently    Comment: occassionally     Social History     Tobacco Use   Smoking Status Former    Packs/day: 0.50    Years: 12.00    Total pack years: 6.00    Types: Cigarettes    Quit date: 2018    Years since quittin.8   Smokeless Tobacco Never   Tobacco Comments    quit 2018     Social History     Substance and Sexual Activity   Drug Use Yes    Types: Marijuana    Comment: medical marijuana       Family History:  Family History   Problem Relation Age of Onset    Breast cancer Mother     Hypertension Mother     Hyperlipidemia Father     Hypertension Father     Leukemia Maternal Grandmother     Hyperlipidemia Maternal Grandfather     Hypertension Maternal Grandfather     Hyperlipidemia Paternal Grandmother     Hypertension Paternal Grandmother     Heart disease Paternal Grandfather         cardiac disorder    Diabetes Paternal Grandfather        Physical Exam:     Vitals:   Blood Pressure: (!) 207/103 (11/26/23 1635)  Pulse: 96 (11/26/23 1635)  Temperature: 98.2 °F (36.8 °C) (11/26/23 1629)  Temp Source: Oral (11/26/23 1217)  Respirations: 18 (11/26/23 1629)  Height: 5' 11" (180.3 cm) (11/26/23 1629)  Weight - Scale: 97.6 kg (215 lb 2.7 oz) (11/26/23 1629)  SpO2: 97 % (11/26/23 1629)    Physical Exam  Vitals and nursing note reviewed. Constitutional:       General: He is in acute distress. Appearance: He is well-developed. He is not ill-appearing. HENT:      Head: Normocephalic and atraumatic. Mouth/Throat:      Mouth: Mucous membranes are dry. Eyes:      Conjunctiva/sclera: Conjunctivae normal.   Cardiovascular:      Rate and Rhythm: Regular rhythm. Tachycardia present. Heart sounds: No murmur heard. Pulmonary:      Effort: Pulmonary effort is normal. No respiratory distress. Breath sounds: Normal breath sounds. Abdominal:      General: Bowel sounds are normal. There is no distension. Palpations: Abdomen is soft. Tenderness: There is no abdominal tenderness. There is no guarding or rebound. Musculoskeletal:         General: No swelling. Cervical back: Neck supple. Skin:     General: Skin is warm and dry. Capillary Refill: Capillary refill takes less than 2 seconds. Neurological:      Mental Status: He is alert.    Psychiatric:         Mood and Affect: Mood normal.         Additional Data:     Lab Results:  Results from last 7 days   Lab Units 11/26/23  1243   WBC Thousand/uL 8.38   HEMOGLOBIN g/dL 11.1*   HEMATOCRIT % 34.1*   PLATELETS Thousands/uL 225   NEUTROS PCT % 88*   LYMPHS PCT % 2*   MONOS PCT % 5   EOS PCT % 5     Results from last 7 days   Lab Units 11/26/23  1243   SODIUM mmol/L 142   POTASSIUM mmol/L 4.0   CHLORIDE mmol/L 99   CO2 mmol/L 28   BUN mg/dL 44*   CREATININE mg/dL 9.72*   ANION GAP mmol/L 15   CALCIUM mg/dL 8.2*   ALBUMIN g/dL 4.3   TOTAL BILIRUBIN mg/dL 0.63   ALK PHOS U/L 76   ALT U/L 12   AST U/L 17   GLUCOSE RANDOM mg/dL 239*     Results from last 7 days   Lab Units 11/26/23  1349   INR  0.98     Results from last 7 days   Lab Units 11/26/23  1632   POC GLUCOSE mg/dl 222*         Results from last 7 days   Lab Units 11/26/23  1438   LACTIC ACID mmol/L 0.7       Lines/Drains:  Invasive Devices       Peripheral Intravenous Line  Duration             Peripheral IV 11/26/23 Right Antecubital <1 day              Line  Duration             Hemodialysis AV Fistula Left Forearm -- days    Peritoneal Dialysis Catheter Sidney-neck/flanged collar Left lower abdomen 1938 days    Hemodialysis AV Fistula 11/09/20 Left Other (Comment) 1112 days                        Imaging: Reviewed radiology reports from this admission including: chest xray  XR chest 1 view portable   ED Interpretation by Taran Winslow PA-C (11/26 1825)   No acute cardiopulmonary disease          EKG and Other Studies Reviewed on Admission:   EKG: NSR. HR 85. Qtc 506    ** Please Note: This note has been constructed using a voice recognition system.  **

## 2023-11-26 NOTE — PLAN OF CARE
Problem: PAIN - ADULT  Goal: Verbalizes/displays adequate comfort level or baseline comfort level  Description: Interventions:  - Encourage patient to monitor pain and request assistance  - Assess pain using appropriate pain scale  - Administer analgesics based on type and severity of pain and evaluate response  - Implement non-pharmacological measures as appropriate and evaluate response  - Consider cultural and social influences on pain and pain management  - Notify physician/advanced practitioner if interventions unsuccessful or patient reports new pain  Outcome: Progressing     Problem: GASTROINTESTINAL - ADULT  Goal: Minimal or absence of nausea and/or vomiting  Description: INTERVENTIONS:  - Administer IV fluids if ordered to ensure adequate hydration  - Maintain NPO status until nausea and vomiting are resolved  - Nasogastric tube if ordered  - Administer ordered antiemetic medications as needed  - Provide nonpharmacologic comfort measures as appropriate  - Advance diet as tolerated, if ordered  - Consider nutrition services referral to assist patient with adequate nutrition and appropriate food choices  Outcome: Progressing  Goal: Maintains adequate nutritional intake  Description: INTERVENTIONS:  - Monitor percentage of each meal consumed  - Identify factors contributing to decreased intake, treat as appropriate  - Assist with meals as needed  - Monitor I&O, weight, and lab values if indicated  - Obtain nutrition services referral as needed  Outcome: Progressing  Goal: Establish and maintain optimal ostomy function  Description: INTERVENTIONS:  - Assess bowel function  - Encourage oral fluids to ensure adequate hydration  - Administer IV fluids if ordered to ensure adequate hydration   - Administer ordered medications as needed  - Encourage mobilization and activity  - Nutrition services referral to assist patient with appropriate food choices  - Assess stoma site  - Consider wound care consult   Outcome: Progressing

## 2023-11-26 NOTE — ASSESSMENT & PLAN NOTE
- MWF dialysis schedule, last session on Friday 11/24  - BUN 44, Cr 9.72 (baseline 7-8)  - Home med: PhosLo, Sensipar, Torsemide 100 mg qd  - Does not make urine at baseline    Plan:  - Nephrology consulted for hemodialysis tomorrow  - Continue home PhosLo, Sensipar, and torsemide

## 2023-11-26 NOTE — ASSESSMENT & PLAN NOTE
- Upon presentation, bp 216/105  - s/p 2 x IV labetalol 10 mg in the ED  - Home meds: Clonidine patch weekly, hydralazine 25 mg TID, labetalol 400 mg TID, lisinopril 80 mg qd, nifedipine 90 mg qd    Plan:  Continue all PTA home meds on discharge, except torsemide - patient is anuric.

## 2023-11-26 NOTE — ASSESSMENT & PLAN NOTE
- N/V/D for the past 24 hours. No abdominal pain. Afebrile, no leukocytosis. Likely viral etiology  - 15 episodes of nonbilious, nonbloody vomiting episodes  - 5 episodes of loose stools.  No hematochezia  - Multiple family members with similar sx - mother and toddler nieces/nephews  - CT abdomen/pelvis not done due to lack of abdominal pain and tenderness on exam    Plan:  Supportive care on discharge, hydration and increase PO intake as tolerated

## 2023-11-26 NOTE — ED PROVIDER NOTES
History  Chief Complaint   Patient presents with    Vomiting     Nausea, diarrhea and vomiting that started 24 hrs ago. Goes to dialysis Mon, Wed, Fri. Reports he has ever issue besides cardiac issues (HTN, stroke, CKD). History provided by:  Patient  Vomiting  Severity:  Moderate  Timing:  Constant  Quality:  Stomach contents  Progression:  Unchanged  Chronicity:  New  Recent urination:  Normal  Relieved by:  Nothing  Worsened by:  Nothing  Ineffective treatments:  None tried  Associated symptoms: diarrhea    Associated symptoms: no abdominal pain, no arthralgias, no chills, no cough, no fever, no headaches, no myalgias, no sore throat and no URI    Risk factors: diabetes    Risk factors: no alcohol use        Prior to Admission Medications   Prescriptions Last Dose Informant Patient Reported? Taking?    B-D ULTRAFINE III SHORT PEN 31G X 8 MM MISC  Self No No   Sig: USE 1 PEN NEEDLE 8 TIMES DAILY   GLUCAGON EMERGENCY 1 MG injection  Self Yes No   Gvoke HypoPen 1-Pack 1 MG/0.2ML SOAJ  Self Yes No   Insulin Disposable Pump (Omnipod 5 G6 Intro, Gen 5,) KIT  Self Yes No   Insulin Disposable Pump (Omnipod 5 G6 Pod, Gen 5,) MISC  Self Yes No   NIFEdipine (PROCARDIA XL) 90 mg 24 hr tablet 11/25/2023 Self No Yes   Sig: Take 1 tablet (90 mg total) by mouth daily   NIFEdipine ER (ADALAT CC) 60 MG 24 hr tablet Not Taking Self No No   Sig: Take 1 tablet (60 mg total) by mouth 2 (two) times a day   Patient not taking: Reported on 11/26/2023   NovoLOG 100 UNIT/ML injection  Self Yes No   NovoLOG FlexPen ReliOn 100 UNIT/ML injection pen  Self Yes No   Patiromer Sorbitex Calcium (VELTASSA PO)  Self Yes No   Sig: Take 5 g by mouth once a week   SPS 15 GM/60ML suspension  Self Yes No   Sig: TAKE 60 ML BY MOUTH SINGLE DOSE FOR EMERGENCY USE DURING MISSED HEMODIALYSIS   Velphoro 500 MG CHEW   Yes No   Sig: CRUSH OR CHEW AND SWALLOW 2 TABLETS 3 TIMES A DAY WITH MEALS   aspirin (ECOTRIN LOW STRENGTH) 81 mg EC tablet 11/25/2023 Self Yes Yes   Sig: Take 81 mg by mouth daily   atorvastatin (LIPITOR) 40 mg tablet 2023  No Yes   Sig: Take 1 tablet (40 mg total) by mouth every evening   b complex vitamins capsule 2023 Self Yes Yes   Sig: Take 1 capsule by mouth daily before lunch    calcium acetate (PHOSLO) capsule 2023 Self Yes Yes   Sig: TAKE 3 CAPSULES BY MOUTH WITH MEALS   cinacalcet (SENSIPAR) 60 MG tablet 2023 Self Yes Yes   Sig: Take 120 mg by mouth daily 2 tab daily    cloNIDine (CATAPRES-TTS-3) 0.3 mg/24 hr 2023 Self Yes Yes   Si patch once a week   clotrimazole (LOTRIMIN) 1 % cream Not Taking Self No No   Sig: Apply to affected area 2 times daily for 2 weeks   Patient not taking: Reported on 2023   escitalopram (LEXAPRO) 20 mg tablet 2023 Self No Yes   Sig: Take 1 tablet (20 mg total) by mouth daily   famotidine (PEPCID) 40 MG tablet 2023 Self No Yes   Sig: Take 1 tablet (40 mg total) by mouth in the morning   gabapentin (NEURONTIN) 100 mg capsule 2023 Self No Yes   Sig: TAKE 2 CAPSULES BY MOUTH AT BEDTIME   hydrALAZINE (APRESOLINE) 25 mg tablet 2023 Self No Yes   Sig: Take 1 tablet (25 mg total) by mouth 3 (three) times a day   hydrOXYzine HCL (ATARAX) 10 mg tablet Past Week Self No Yes   Sig: Take 1 tablet (10 mg total) by mouth every 6 (six) hours as needed for itching or anxiety   insulin glargine (Basaglar KwikPen) 100 units/mL injection pen  Self No No   Sig: Inject 7 Units under the skin daily at bedtime   labetalol (NORMODYNE) 200 mg tablet 2023 Self No Yes   Sig: TAKE 2 TABLETS BY MOUTH THREE TIMES DAILY   lisinopril (ZESTRIL) 40 mg tablet 2023 Self Yes Yes   Sig: Take 80 mg by mouth daily   mupirocin (BACTROBAN) 2 % ointment  Self No No   Sig: Apply topically 3 (three) times a day Until lesion on right hand heals.    ofloxacin (FLOXIN) 0.3 % otic solution  Self No No   Sig: Administer 10 drops to the right ear daily   ondansetron (ZOFRAN) 4 mg tablet Past Week Self No Yes   Sig: Take 1 tablet (4 mg total) by mouth every 8 (eight) hours as needed for nausea or vomiting   saccharomyces boulardii (FLORASTOR) 250 mg capsule 11/25/2023 Self Yes Yes   Sig: Take 250 mg by mouth daily   torsemide (DEMADEX) 100 mg tablet 11/25/2023 Self Yes Yes   Sig: Take 100 mg by mouth daily   traZODone (DESYREL) 50 mg tablet Past Week Self No Yes   Sig: Take 0.5 tablets (25 mg total) by mouth daily at bedtime as needed for sleep   triamcinolone (KENALOG) 0.1 % ointment  Self No No   Sig: Apply topically 2 (two) times a day For up to 14 days on the itchy areas. Avoid groin, underarms and face. It is important to take at least 1 week break between uses. Facility-Administered Medications: None       Past Medical History:   Diagnosis Date    Acute kidney injury ([address])     Ambulates with cane     Anuria     Anxiety     Cellulitis of right elbow 03/31/2021    Chronic kidney disease     Depression     Diabetes mellitus ([address])     Diarrhea     Emesis 10/24/2020    End stage renal disease ([address]) 02/11/2018    Formatting of this note might be different from the original. Last Assessment & Plan:  Secondary to DM. On nightly PD. Followed by [facility].   Patient considering transplant for kidney and pancreas through [address] of this note might be different from the original. Last Assessment & Plan:  Formatting of this note might be different from the original. Lab Results  Component Value Date   EGFR     Esophagitis 7/21/2015    Falls     Gastroparesis     GERD (gastroesophageal reflux disease)     History of shingles 2010    History of transfusion 02/2018    no adverse reaction    Hyperlipidemia     Hyperphosphatemia     Hypertension     Hypoglycemia 7/15/2022    Itching     Mastoiditis of right side 07/15/2022    Muscle weakness     general unsteadiness    Obesity (BMI 30.0-34.9) 09/09/2019    Orthostatic hypotension 10/25/2020    Peripheral polyneuropathy 11/20/2019    PONV (postoperative nausea and vomiting) 01/26/2018    Protein-calorie malnutrition ([address]) 11/23/2020    Recurrent peritonitis ([address]) due to peritoneal dialysis catheter 07/31/2020    Retinopathy     Seizures ([address])     early 2020 - one time    Skin abnormality     some dime size areas where skin was scratched from itching    Spontaneous bacterial peritonitis ([address]) 10/19/2020    Squamous cell skin cancer     left temple    Stroke ([address])     x2 - off balance/no driving/fatigue    Swelling of both lower extremities     Traumatic onycholysis 7/21/2022    Vomiting     Wears glasses        Past Surgical History:   Procedure Laterality Date    CARDIAC ELECTROPHYSIOLOGY PROCEDURE N/A 9/21/2023    Procedure: Cardiac loop recorder explant;  Surgeon: Lorene Boeck, MD;  Location: [facility]; Service: Cardiology    CARDIAC LOOP RECORDER  05/2018    COLONOSCOPY      EGD      EYE SURGERY Right     IR AV FISTULAGRAM/GRAFTOGRAM  02/23/2021    IR TUNNELED CENTRAL LINE PLACEMENT  02/16/2021    IR TUNNELED DIALYSIS CATHETER PLACEMENT  11/18/2020    IR TUNNELED DIALYSIS CATHETER REMOVAL  02/12/2021    IR TUNNELED DIALYSIS CATHETER REMOVAL  03/11/2021    MOHS SURGERY Left 12/14/2022    Left temple with Dr. Lanae Meckel N/A 08/27/2018    Procedure: UNROOF PD CATHETER;  Surgeon: Jamie Shaver DO;  Location: [facility];  Service: General    WI ARTERIOVENOUS ANASTOMOSIS OPEN DIRECT Left 11/09/2020    Procedure: CREATION FISTULA  ARTERIOVENOUS (AV) - LEFT WRIST;  Surgeon: Selene Noriega MD;  Location: [facility];  Service: Vascular    WI ESOPHAGOGASTRODUODENOSCOPY TRANSORAL DIAGNOSTIC N/A 04/18/2019    Procedure: ESOPHAGOGASTRODUODENOSCOPY (EGD); Surgeon: Davey Ormond, MD;  Location: [facility];   Service: Gastroenterology    WI LAPS INSERTION TUNNELED INTRAPERITONEAL CATHETER N/A 08/06/2018    Procedure: LAPAROSCOPIC PD CATHETER PLACEMENT;  Surgeon: Jamie Shaver DO;  [facility];  Service: General DE REMOVAL TUNNELED INTRAPERITONEAL CATHETER N/A 2020    Procedure: REMOVAL CATHETER PERITONEAL DIALYSIS;  Surgeon: Ember Draper MD;  [facility];  Service: General    TONSILLECTOMY      UPPER GASTROINTESTINAL ENDOSCOPY         Family History   Problem Relation Age of Onset    Breast cancer Mother     Hypertension Mother     Hyperlipidemia Father     Hypertension Father     Leukemia Maternal Grandmother     Hyperlipidemia Maternal Grandfather     Hypertension Maternal Grandfather     Hyperlipidemia Paternal Grandmother     Hypertension Paternal Grandmother     Heart disease Paternal Grandfather         cardiac disorder    Diabetes Paternal Grandfather      I have reviewed and agree with the history as documented. E-Cigarette/Vaping    E-Cigarette Use Current Every Day User     Comments medical marijuana      E-Cigarette/Vaping Substances    Nicotine No     THC Yes     CBD Yes     Flavoring No     Other No     Unknown No      Social History     Tobacco Use    Smoking status: Former     Packs/day: 0.50     Years: 12.00     Total pack years: 6.00     Types: Cigarettes     Quit date: 2018     Years since quittin.8    Smokeless tobacco: Never    Tobacco comments:     quit 2018   Vaping Use    Vaping Use: Every day    Substances: THC, CBD   Substance Use Topics    Alcohol use: Not Currently     Comment: occassionally    Drug use: Yes     Types: Marijuana     Comment: medical marijuana       Review of Systems   Constitutional:  Positive for activity change, appetite change and fatigue. Negative for chills, diaphoresis and fever. HENT:  Negative for congestion, dental problem, ear discharge, ear pain, postnasal drip, rhinorrhea and sore throat. Eyes:  Negative for pain, discharge, redness and itching. Respiratory:  Negative for cough and chest tightness. Gastrointestinal:  Positive for diarrhea and vomiting. Negative for abdominal pain.    Genitourinary:  Negative for dysuria, frequency, hematuria and urgency. Musculoskeletal:  Negative for arthralgias and myalgias. Skin:  Positive for pallor. Negative for color change, rash and wound. Neurological:  Negative for headaches. Psychiatric/Behavioral:  Negative for confusion. All other systems reviewed and are negative. Physical Exam  Physical Exam  Vitals and nursing note reviewed. Constitutional:       General: He is not in acute distress. Appearance: Normal appearance. He is normal weight. He is not ill-appearing or toxic-appearing. HENT:      Head: Normocephalic. Right Ear: Tympanic membrane and external ear normal.      Left Ear: Tympanic membrane and external ear normal.      Mouth/Throat:      Mouth: Mucous membranes are dry. Eyes:      General:         Right eye: No discharge. Left eye: No discharge. Conjunctiva/sclera: Conjunctivae normal.   Cardiovascular:      Rate and Rhythm: Normal rate and regular rhythm. Heart sounds: Normal heart sounds. Pulmonary:      Effort: Pulmonary effort is normal.      Breath sounds: Normal breath sounds. Abdominal:      General: There is no distension. Palpations: Abdomen is soft. Tenderness: There is no abdominal tenderness. There is no guarding or rebound. Musculoskeletal:      Cervical back: Neck supple. Skin:     General: Skin is warm. Capillary Refill: Capillary refill takes less than 2 seconds. Neurological:      Mental Status: He is alert and oriented to person, place, and time. Psychiatric:         Mood and Affect: Mood normal.         Behavior: Behavior normal.         Thought Content:  Thought content normal.         Judgment: Judgment normal.         Vital Signs  ED Triage Vitals   Temperature Pulse Respirations Blood Pressure SpO2   11/26/23 1217 11/26/23 1217 11/26/23 1217 11/26/23 1217 11/26/23 1217   98.3 °F (36.8 °C) 85 18 (!) 216/105 98 %      Temp Source Heart Rate Source Patient Position - Orthostatic VS BP Location FiO2 (%)   11/26/23 1217 11/26/23 1217 11/26/23 1330 11/26/23 1217 --   Oral Monitor Sitting Right arm       Pain Score       11/26/23 1635       No Pain           Vitals:    11/26/23 1600 11/26/23 1629 11/26/23 1635 11/26/23 1749   BP: (!) 204/90 (!) 207/103 (!) 207/103 (!) 187/89   Pulse: 92 96 96 96   Patient Position - Orthostatic VS: Lying            Visual Acuity      ED Medications  Medications   hydrALAZINE (APRESOLINE) tablet 25 mg (25 mg Oral Given 11/26/23 1632)   labetalol (NORMODYNE) tablet 400 mg (400 mg Oral Given 11/26/23 1632)   NIFEdipine (PROCARDIA XL) 24 hr tablet 90 mg (has no administration in time range)   torsemide (DEMADEX) tablet 100 mg (has no administration in time range)   insulin glargine (LANTUS) subcutaneous injection 5 Units 0.05 mL (has no administration in time range)   dextrose 5 % and sodium chloride 0.9 % infusion (75 mL/hr Intravenous New Bag 11/26/23 1650)   lisinopril (ZESTRIL) tablet 80 mg (80 mg Oral Given 11/26/23 1633)   aspirin (ECOTRIN LOW STRENGTH) EC tablet 81 mg (has no administration in time range)   atorvastatin (LIPITOR) tablet 40 mg (40 mg Oral Given 11/26/23 1701)   calcium acetate (PHOSLO) capsule 2,001 mg (2,001 mg Oral Not Given 11/26/23 1646)   cinacalcet (SENSIPAR) tablet 120 mg (has no administration in time range)   escitalopram (LEXAPRO) tablet 20 mg (has no administration in time range)   gabapentin (NEURONTIN) capsule 200 mg (has no administration in time range)   hydrOXYzine HCL (ATARAX) tablet 10 mg (has no administration in time range)   traZODone (DESYREL) tablet 25 mg (has no administration in time range)   acetaminophen (TYLENOL) tablet 650 mg (has no administration in time range)   insulin lispro (HumaLOG) 100 units/mL subcutaneous injection 1-6 Units (2 Units Subcutaneous Given 11/26/23 1704)   labetalol (NORMODYNE) injection 10 mg ( Intravenous Canceled Entry 11/26/23 7813)   trimethobenzamide (TIGAN) IM injection 200 mg (has no administration in time range)   famotidine (PEPCID) tablet 10 mg (has no administration in time range)   ondansetron (FOR EMS ONLY) (ZOFRAN) 4 mg/2 mL injection 4 mg ( Does not apply Given to EMS 11/26/23 1222)   ondansetron (ZOFRAN) injection 4 mg (4 mg Intravenous Given 11/26/23 1348)   labetalol (NORMODYNE) injection 10 mg (10 mg Intravenous Given 11/26/23 1444)   labetalol (NORMODYNE) injection 10 mg (10 mg Intravenous Given 11/26/23 1549)       Diagnostic Studies  Results Reviewed       Procedure Component Value Units Date/Time    HS Troponin I 4hr [[id number]]  (Normal) Collected: 11/26/23 1752    Lab Status: Final result Specimen: Blood from Arm, Right Updated: 11/26/23 1833     hs TnI 4hr 24 ng/L      Delta 4hr hsTnI 6 ng/L     HS Troponin I 2hr [[id number]]  (Normal) Collected: 11/26/23 1549    Lab Status: Final result Specimen: Blood from Arm, Right Updated: 11/26/23 1624     hs TnI 2hr 17 ng/L      Delta 2hr hsTnI -1 ng/L     Stool Enteric Bacterial Panel by PCR [[id number]]     Lab Status: No result Specimen: Stool from Rectum     Lactic acid, plasma (w/reflex if result > 2.0) [[id number]]  (Normal) Collected: 11/26/23 1438    Lab Status: Final result Specimen: Blood from Arm, Right Updated: 11/26/23 1503     LACTIC ACID 0.7 mmol/L     Narrative:      Result may be elevated if tourniquet was used during collection. FLU/RSV/COVID - if FLU/RSV clinically relevant [[id number]]  (Normal) Collected: 11/26/23 1349    Lab Status: Final result Specimen: Nares from Nose Updated: 11/26/23 1451     SARS-CoV-2 Negative     INFLUENZA A PCR Negative     INFLUENZA B PCR Negative     RSV PCR Negative    Narrative:      FOR PEDIATRIC PATIENTS - copy/paste COVID Guidelines URL to browser: [email or web address]    SARS-CoV-2 assay is a Nucleic Acid Amplification assay intended for the  qualitative detection of nucleic acid from SARS-CoV-2 in nasopharyngeal  swabs.  Results are for the presumptive identification of SARS-CoV-2 RNA. Positive results are indicative of infection with SARS-CoV-2, the virus  causing COVID-19, but do not rule out bacterial infection or co-infection  with other viruses. Laboratories within the [facility] and its  territories are required to report all positive results to the appropriate  public health authorities. Negative results do not preclude SARS-CoV-2  infection and should not be used as the sole basis for treatment or other  patient management decisions. Negative results must be combined with  clinical observations, patient history, and epidemiological information. This test has not been FDA cleared or approved. This test has been authorized by FDA under an Emergency Use Authorization  (EUA). This test is only authorized for the duration of time the  declaration that circumstances exist justifying the authorization of the  emergency use of an in vitro diagnostic tests for detection of SARS-CoV-2  virus and/or diagnosis of COVID-19 infection under section 564(b)(1) of  the Act, 21 U. S.C. 949PYM-8(E)(6), unless the authorization is terminated  or revoked sooner. The test has been validated but independent review by FDA  and CLIA is pending. Test performed using [employer] GeneXpert: This RT-PCR assay targets N2,  a region unique to SARS-CoV-2. A conserved region in the E-gene was chosen  for pan-Sarbecovirus detection which includes SARS-CoV-2. According to CMS-2020-01-R, this platform meets the definition of high-throughput technology. Blood culture #2 [[id number]] Collected: 11/26/23 1436    Lab Status:  In process Specimen: Blood from Arm, Right Updated: 11/26/23 1442    HS Troponin 0hr (reflex protocol) [[id number]]  (Normal) Collected: 11/26/23 1349    Lab Status: Final result Specimen: Blood from Arm, Right Updated: 11/26/23 1434     hs TnI 0hr 18 ng/L     Protime-INR [[id number]]  (Normal) Collected: 11/26/23 1349    Lab Status: Final result Specimen: Blood from Arm, Right Updated: 11/26/23 1422     Protime 13.6 seconds      INR 0.98    APTT [[id number]]  (Normal) Collected: 11/26/23 1349    Lab Status: Final result Specimen: Blood from Arm, Right Updated: 11/26/23 1422     PTT 31 seconds     Beta Hydroxybutyrate [[id number]]  (Abnormal) Collected: 11/26/23 1349    Lab Status: Final result Specimen: Blood from Arm, Right Updated: 11/26/23 1415     BETA-HYDROXYBUTYRATE 0.9 mmol/L     Blood gas, venous [[id number]]  (Abnormal) Collected: 11/26/23 1349    Lab Status: Final result Specimen: Blood from Arm, Right Updated: 11/26/23 1411     pH, Sathish 7.361     pCO2, Sathish 46.8 mm Hg      pO2, Sathish 48.9 mm Hg      HCO3, Sathish 25.9 mmol/L      Base Excess, Sathish 0.1 mmol/L      O2 Content, Sathish 12.5 ml/dL      O2 HGB, VENOUS 78.3 %     Blood culture #1 [[id number]] Collected: 11/26/23 1349    Lab Status:  In process Specimen: Blood from Arm, Right Updated: 11/26/23 1408    UA w Reflex to Microscopic w Reflex to Culture [[id number]]     Lab Status: No result Specimen: Urine     Comprehensive metabolic panel [[id number]]  (Abnormal) Collected: 11/26/23 1243    Lab Status: Final result Specimen: Blood from Arm, Right Updated: 11/26/23 1304     Sodium 142 mmol/L      Potassium 4.0 mmol/L      Chloride 99 mmol/L      CO2 28 mmol/L      ANION GAP 15 mmol/L      BUN 44 mg/dL      Creatinine 9.72 mg/dL      Glucose 239 mg/dL      Calcium 8.2 mg/dL      AST 17 U/L      ALT 12 U/L      Alkaline Phosphatase 76 U/L      Total Protein 6.6 g/dL      Albumin 4.3 g/dL      Total Bilirubin 0.63 mg/dL      eGFR 5 ml/min/1.73sq m     Narrative:      [place] guidelines for Chronic Kidney Disease (CKD):     Stage 1 with normal or high GFR (GFR > 90 mL/min/1.73 square meters)    Stage 2 Mild CKD (GFR = 60-89 mL/min/1.73 square meters)    Stage 3A Moderate CKD (GFR = 45-59 mL/min/1.73 square meters)    Stage 3B Moderate CKD (GFR = 30-44 mL/min/1.73 square meters) Stage 4 Severe CKD (GFR = 15-29 mL/min/1.73 square meters)    Stage 5 End Stage CKD (GFR <15 mL/min/1.73 square meters)  Note: GFR calculation is accurate only with a steady state creatinine    Lipase [[id number]]  (Normal) Collected: 11/26/23 1243    Lab Status: Final result Specimen: Blood from Arm, Right Updated: 11/26/23 1304     Lipase 34 u/L     CBC and differential [[id number]]  (Abnormal) Collected: 11/26/23 1243    Lab Status: Final result Specimen: Blood from Arm, Right Updated: 11/26/23 1256     WBC 8.38 Thousand/uL      RBC 3.40 Million/uL      Hemoglobin 11.1 g/dL      Hematocrit 34.1 %       fL      MCH 32.6 pg      MCHC 32.6 g/dL      RDW 15.3 %      MPV 10.5 fL      Platelets 163 Thousands/uL      nRBC 0 /100 WBCs      Neutrophils Relative 88 %      Immat GRANS % 0 %      Lymphocytes Relative 2 %      Monocytes Relative 5 %      Eosinophils Relative 5 %      Basophils Relative 0 %      Neutrophils Absolute 7.32 Thousands/µL      Immature Grans Absolute 0.03 Thousand/uL      Lymphocytes Absolute 0.20 Thousands/µL      Monocytes Absolute 0.43 Thousand/µL      Eosinophils Absolute 0.38 Thousand/µL      Basophils Absolute 0.02 Thousands/µL                    XR chest 1 view portable   ED Interpretation by Eden Moore PA-C (11/26 [id number])   No acute cardiopulmonary disease                 Procedures  ECG 12 Lead Documentation Only    Date/Time: 11/26/2023 1:24 PM    Performed by: Eden Moore PA-C  Authorized by: Eden Moore PA-C    Indications / Diagnosis:  Vomiting  ECG reviewed by me, the ED Provider: yes    Patient location:  ED  Previous ECG:     Previous ECG:  Compared to current    Comparison ECG info:  1/15/23    Similarity:  No change    Comparison to cardiac monitor: Yes    Interpretation:     Interpretation: abnormal    Rate:     ECG rate:  85    ECG rate assessment: normal    Rhythm:     Rhythm: sinus rhythm    Ectopy:     Ectopy: none    QRS:     QRS axis: Normal    QRS intervals:  Normal  Conduction:     Conduction: normal    ST segments:     ST segments:  Non-specific  T waves:     T waves: non-specific    Comments:      ST segments are no longer depressed in the inferior leads ST elevation is no present longer present in the lateral leads. T wave inversion is no longer evident in the inferior leads. Patient now has nonspecific T wave abnormalities in the lateral leads. ED Course                                             Medical Decision Making  Patient presents to the emergency room with an onset of vomiting and diarrhea. He states he has vomited 10 times. He states he has had 2-3 episodes of diarrhea. He was exposed to a family member was sick with similar symptoms. He denies any chest or abdominal pain. He denies any shortness of breath. He has not been unable to take any of his medications. He cannot keep anything down. He complains of chills but no documented fever. He denies any urinary frequency, urgency, hematuria. He is on dialysis Monday Wednesdays and Fridays. 42-year-old male is alert and oriented x 3. He is in mild distress secondary to dehydration. Is pale in appearance. His mucous membranes are dry. His posterior pharynx is nonerythematous without exudate. His lungs are clear to auscultation. His heart is regular rate and rhythm without murmur. His abdomen is soft nondistended nontender without mass or hepatosplenomegaly. He has no peripheral edema    Laboratory studies were taken and were reviewed.'s EKG does not demonstrate any acute signs of ischemia. Chest x-ray does not demonstrate any acute cardiopulmonary disease. Impression  Dehydration  Gastroenteritis  Vomiting and diarrhea  Worsening renal failure    Plan-  Patient was admitted to the [facility] service. He did not meet acute SIRS criteria or acute sepsis. He was admitted for IV hydration and to have dialysis tomorrow.   A renal consult was placed. .                                                  Amount and/or Complexity of Data Reviewed  Labs: ordered. Details: Independently interpreted by me  Radiology: ordered and independent interpretation performed. Details: Independently interpreted by me, no acute cardiopulmonary disease  ECG/medicine tests: ordered and independent interpretation performed. Details: Independently interpreted by me, no acute ischemia. Risk  Prescription drug management. Decision regarding hospitalization. Disposition  Final diagnoses:   Vomiting and diarrhea   Gastroenteritis   Renal failure   Dehydration     Time reflects when diagnosis was documented in both MDM as applicable and the Disposition within this note       Time User Action Codes Description Comment    11/26/2023  3:31 PM [email or web address] Dumont [R11.10,  R19.7] Vomiting and diarrhea     11/26/2023  3:31 PM Geroldine Leaks Add [K52.9] Gastroenteritis     11/26/2023  3:32 PM Geroldine Leaks Add [N19] Renal failure     11/26/2023  3:37 PM Geroldine Leaks Add [E86.0] Dehydration           ED Disposition       ED Disposition   Admit    Condition   Stable    Date/Time   Sun Nov 26, 2023  3:33 PM    Comment   Case was discussed with Dr Umang Briggs and the patient's admission status was agreed to be Admission Status: inpatient status to the service of Dr. Umang Briggs . Follow-up Information    None         Current Discharge Medication List        CONTINUE these medications which have NOT CHANGED    Details   aspirin (ECOTRIN LOW STRENGTH) 81 mg EC tablet Take 81 mg by mouth daily      atorvastatin (LIPITOR) 40 mg tablet Take 1 tablet (40 mg total) by mouth every evening  Qty: 90 tablet, Refills: 3    Associated Diagnoses: Blurred vision; Cerebrovascular accident (CVA), unspecified mechanism ([address]); Optic neuritis due to multiple sclerosis ([address]); Hypertensive urgency;  Acute renal failure with acute tubular necrosis superimposed on stage 2 chronic kidney disease ; Acute renal failure with acute tubular necrosis superimposed on stage 3 chronic kidney disease ([address]); Azotemia; Hyperphosphatemia; Persistent proteinuria; Vitamin D deficiency; Cerebrovascular accident (CVA) of left pontine structure ([address]); Type 1 diabetes mellitus with diabetic nephropathy, with long-term current use of insulin ([address]); History of lacunar cerebrovascular accident (CVA); Nausea; 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; Hyperhomocysteinemia ([address]); Binocular visual disturbance      b complex vitamins capsule Take 1 capsule by mouth daily before lunch       calcium acetate (PHOSLO) capsule TAKE 3 CAPSULES BY MOUTH WITH MEALS      cinacalcet (SENSIPAR) 60 MG tablet Take 120 mg by mouth daily 2 tab daily       cloNIDine (CATAPRES-TTS-3) 0.3 mg/24 hr 1 patch once a week      escitalopram (LEXAPRO) 20 mg tablet Take 1 tablet (20 mg total) by mouth daily  Qty: 90 tablet, Refills: 2    Associated Diagnoses: Moderate episode of recurrent major depressive disorder (HCC)      famotidine (PEPCID) 40 MG tablet Take 1 tablet (40 mg total) by mouth in the morning  Qty: 90 tablet, Refills: 1    Associated Diagnoses: Gastroesophageal reflux disease without esophagitis      gabapentin (NEURONTIN) 100 mg capsule TAKE 2 CAPSULES BY MOUTH AT BEDTIME  Qty: 180 capsule, Refills: 1    Associated Diagnoses: Diabetic polyneuropathy associated with type 1 diabetes mellitus (HCC)      hydrALAZINE (APRESOLINE) 25 mg tablet Take 1 tablet (25 mg total) by mouth 3 (three) times a day  Qty: 270 tablet, Refills: 0    Associated Diagnoses: Uncontrolled hypertension; Hypertensive urgency      hydrOXYzine HCL (ATARAX) 10 mg tablet Take 1 tablet (10 mg total) by mouth every 6 (six) hours as needed for itching or anxiety  Qty: 30 tablet, Refills: 3    Associated Diagnoses: Mild episode of recurrent major depressive disorder ([address]);  Generalized anxiety disorder; Pruritus      labetalol (NORMODYNE) 200 mg tablet TAKE 2 TABLETS BY MOUTH THREE TIMES DAILY  Qty: 180 tablet, Refills: 0    Associated Diagnoses: Renovascular hypertension      lisinopril (ZESTRIL) 40 mg tablet Take 80 mg by mouth daily      NIFEdipine (PROCARDIA XL) 90 mg 24 hr tablet Take 1 tablet (90 mg total) by mouth daily  Qty: 30 tablet, Refills: 0    Associated Diagnoses: Uncontrolled hypertension; Hypertensive urgency      ondansetron (ZOFRAN) 4 mg tablet Take 1 tablet (4 mg total) by mouth every 8 (eight) hours as needed for nausea or vomiting  Qty: 90 tablet, Refills: 0    Associated Diagnoses: Vertigo      saccharomyces boulardii (FLORASTOR) 250 mg capsule Take 250 mg by mouth daily      torsemide (DEMADEX) 100 mg tablet Take 100 mg by mouth daily      traZODone (DESYREL) 50 mg tablet Take 0.5 tablets (25 mg total) by mouth daily at bedtime as needed for sleep  Qty: 30 tablet, Refills: 3    Associated Diagnoses: Mild episode of recurrent major depressive disorder ([address]);  Generalized anxiety disorder      B-D ULTRAFINE III SHORT PEN 31G X 8 MM MISC USE 1 PEN NEEDLE 8 TIMES DAILY  Qty: 100 each, Refills: 47    Comments: Please consider 90 day supplies to promote better adherence  Associated Diagnoses: Type 1 diabetes mellitus with diabetic nephropathy, with long-term current use of insulin ([facility])      clotrimazole (LOTRIMIN) 1 % cream Apply to affected area 2 times daily for 2 weeks  Qty: 15 g, Refills: 0    Associated Diagnoses: Tinea pedis of both feet      GLUCAGON EMERGENCY 1 MG injection Refills: 3      Gvoke HypoPen 1-Pack 1 MG/0.2ML SOAJ       Insulin Disposable Pump (Omnipod 5 G6 Intro, Gen 5,) KIT       Insulin Disposable Pump (Omnipod 5 G6 Pod, Gen 5,) MISC       insulin glargine (Basaglar KwikPen) 100 units/mL injection pen Inject 7 Units under the skin daily at bedtime  Qty: 3 mL, Refills: 0    Associated Diagnoses: Type 1 diabetes mellitus with ketoacidosis (HCC)      mupirocin (BACTROBAN) 2 % ointment Apply topically 3 (three) times a day Until lesion on right hand heals. Qty: 30 g, Refills: 4    Associated Diagnoses: Open wound      NIFEdipine ER (ADALAT CC) 60 MG 24 hr tablet Take 1 tablet (60 mg total) by mouth 2 (two) times a day  Qty: 180 tablet, Refills: 0    Associated Diagnoses: Accelerated hypertension      NovoLOG 100 UNIT/ML injection       NovoLOG FlexPen ReliOn 100 UNIT/ML injection pen       ofloxacin (FLOXIN) 0.3 % otic solution Administer 10 drops to the right ear daily  Qty: 5 mL, Refills: 0    Associated Diagnoses: Right otitis externa      Patiromer Sorbitex Calcium (VELTASSA PO) Take 5 g by mouth once a week      SPS 15 GM/60ML suspension TAKE 60 ML BY MOUTH SINGLE DOSE FOR EMERGENCY USE DURING MISSED HEMODIALYSIS      triamcinolone (KENALOG) 0.1 % ointment Apply topically 2 (two) times a day For up to 14 days on the itchy areas. Avoid groin, underarms and face. It is important to take at least 1 week break between uses. Qty: 454 g, Refills: 0    Associated Diagnoses: Prurigo nodularis      Velphoro 500 MG CHEW CRUSH OR CHEW AND SWALLOW 2 TABLETS 3 TIMES A DAY WITH MEALS             No discharge procedures on file.     PDMP Review         Value Time User    PDMP Reviewed  Yes 7/15/2022  4:20 AM Zackery Pedro MD            ED Provider  Electronically Signed by             Fransisca Hoskins PA-C  11/26/23 1958

## 2023-11-26 NOTE — ASSESSMENT & PLAN NOTE
- HbgA1c 6.0% on 10/28/23  - Currently on insulin pump, unsure of the settings    Plan:  - Stop insulin pump  - Lantus 5 units qhs  - SSI  - Hypoglycemia protocol  - QID blood glucose checks  - Continue to monitor blood sugars since he is on D5 NS

## 2023-11-27 ENCOUNTER — APPOINTMENT (INPATIENT)
Dept: DIALYSIS | Facility: HOSPITAL | Age: 38
DRG: 391 | End: 2023-11-27
Payer: MEDICARE

## 2023-11-27 VITALS
WEIGHT: 215.17 LBS | DIASTOLIC BLOOD PRESSURE: 69 MMHG | TEMPERATURE: 98.9 F | OXYGEN SATURATION: 98 % | BODY MASS INDEX: 30.12 KG/M2 | HEART RATE: 79 BPM | RESPIRATION RATE: 18 BRPM | SYSTOLIC BLOOD PRESSURE: 141 MMHG | HEIGHT: 71 IN

## 2023-11-27 LAB
ALBUMIN SERPL BCP-MCNC: 3.6 G/DL (ref 3.5–5)
ALP SERPL-CCNC: 60 U/L (ref 34–104)
ALT SERPL W P-5'-P-CCNC: 12 U/L (ref 7–52)
ANION GAP SERPL CALCULATED.3IONS-SCNC: 13 MMOL/L
AST SERPL W P-5'-P-CCNC: 18 U/L (ref 13–39)
BASOPHILS # BLD AUTO: 0.03 THOUSANDS/ÂΜL (ref 0–0.1)
BASOPHILS NFR BLD AUTO: 1 % (ref 0–1)
BILIRUB SERPL-MCNC: 0.56 MG/DL (ref 0.2–1)
BUN SERPL-MCNC: 52 MG/DL (ref 5–25)
CALCIUM SERPL-MCNC: 7.9 MG/DL (ref 8.4–10.2)
CHLORIDE SERPL-SCNC: 99 MMOL/L (ref 96–108)
CO2 SERPL-SCNC: 26 MMOL/L (ref 21–32)
CREAT SERPL-MCNC: 10.98 MG/DL (ref 0.6–1.3)
EOSINOPHIL # BLD AUTO: 0.11 THOUSAND/ÂΜL (ref 0–0.61)
EOSINOPHIL NFR BLD AUTO: 2 % (ref 0–6)
ERYTHROCYTE [DISTWIDTH] IN BLOOD BY AUTOMATED COUNT: 15.3 % (ref 11.6–15.1)
GFR SERPL CREATININE-BSD FRML MDRD: 5 ML/MIN/1.73SQ M
GLUCOSE SERPL-MCNC: 107 MG/DL (ref 65–140)
GLUCOSE SERPL-MCNC: 139 MG/DL (ref 65–140)
GLUCOSE SERPL-MCNC: 155 MG/DL (ref 65–140)
GLUCOSE SERPL-MCNC: 156 MG/DL (ref 65–140)
HCT VFR BLD AUTO: 28.5 % (ref 36.5–49.3)
HGB BLD-MCNC: 9.3 G/DL (ref 12–17)
IMM GRANULOCYTES # BLD AUTO: 0.03 THOUSAND/UL (ref 0–0.2)
IMM GRANULOCYTES NFR BLD AUTO: 1 % (ref 0–2)
LYMPHOCYTES # BLD AUTO: 0.49 THOUSANDS/ÂΜL (ref 0.6–4.47)
LYMPHOCYTES NFR BLD AUTO: 8 % (ref 14–44)
MAGNESIUM SERPL-MCNC: 2.5 MG/DL (ref 1.9–2.7)
MCH RBC QN AUTO: 32.3 PG (ref 26.8–34.3)
MCHC RBC AUTO-ENTMCNC: 32.6 G/DL (ref 31.4–37.4)
MCV RBC AUTO: 99 FL (ref 82–98)
MONOCYTES # BLD AUTO: 0.42 THOUSAND/ÂΜL (ref 0.17–1.22)
MONOCYTES NFR BLD AUTO: 6 % (ref 4–12)
NEUTROPHILS # BLD AUTO: 5.46 THOUSANDS/ÂΜL (ref 1.85–7.62)
NEUTS SEG NFR BLD AUTO: 82 % (ref 43–75)
NRBC BLD AUTO-RTO: 0 /100 WBCS
PHOSPHATE SERPL-MCNC: 5.5 MG/DL (ref 2.7–4.5)
PLATELET # BLD AUTO: 176 THOUSANDS/UL (ref 149–390)
PMV BLD AUTO: 9.7 FL (ref 8.9–12.7)
POTASSIUM SERPL-SCNC: 3.8 MMOL/L (ref 3.5–5.3)
PROT SERPL-MCNC: 5.5 G/DL (ref 6.4–8.4)
RBC # BLD AUTO: 2.88 MILLION/UL (ref 3.88–5.62)
SODIUM SERPL-SCNC: 138 MMOL/L (ref 135–147)
WBC # BLD AUTO: 6.54 THOUSAND/UL (ref 4.31–10.16)

## 2023-11-27 PROCEDURE — 99223 1ST HOSP IP/OBS HIGH 75: CPT | Performed by: INTERNAL MEDICINE

## 2023-11-27 PROCEDURE — 82948 REAGENT STRIP/BLOOD GLUCOSE: CPT

## 2023-11-27 PROCEDURE — 80053 COMPREHEN METABOLIC PANEL: CPT | Performed by: HOSPITALIST

## 2023-11-27 PROCEDURE — 85025 COMPLETE CBC W/AUTO DIFF WBC: CPT | Performed by: HOSPITALIST

## 2023-11-27 PROCEDURE — 90935 HEMODIALYSIS ONE EVALUATION: CPT | Performed by: INTERNAL MEDICINE

## 2023-11-27 PROCEDURE — 99238 HOSP IP/OBS DSCHRG MGMT 30/<: CPT | Performed by: HOSPITALIST

## 2023-11-27 RX ADMIN — CINACALCET 120 MG: 30 TABLET, FILM COATED ORAL at 08:24

## 2023-11-27 RX ADMIN — HYDRALAZINE HYDROCHLORIDE 25 MG: 25 TABLET, FILM COATED ORAL at 08:19

## 2023-11-27 RX ADMIN — LABETALOL HYDROCHLORIDE 400 MG: 100 TABLET, FILM COATED ORAL at 08:19

## 2023-11-27 RX ADMIN — ESCITALOPRAM OXALATE 20 MG: 20 TABLET ORAL at 08:19

## 2023-11-27 RX ADMIN — FAMOTIDINE 10 MG: 20 TABLET ORAL at 08:19

## 2023-11-27 RX ADMIN — ASPIRIN 81 MG: 81 TABLET ORAL at 08:19

## 2023-11-27 RX ADMIN — CALCIUM ACETATE 2001 MG: 667 CAPSULE ORAL at 08:19

## 2023-11-27 RX ADMIN — TORSEMIDE 100 MG: 100 TABLET ORAL at 08:19

## 2023-11-27 RX ADMIN — NIFEDIPINE 90 MG: 30 TABLET, EXTENDED RELEASE ORAL at 08:19

## 2023-11-27 RX ADMIN — LISINOPRIL 80 MG: 20 TABLET ORAL at 08:19

## 2023-11-27 NOTE — DISCHARGE SUMMARY
[facility]  Discharge- Adilene oK 1985, 34 y.o. male MRN: [id number]  Unit/Bed#: S -01 Encounter: [id number]  Primary Care Provider: Jamie Ba DO   Date and time admitted to hospital: 11/26/2023 12:13 PM    * Gastroenteritis  Assessment & Plan  - N/V/D for the past 24 hours. No abdominal pain. Afebrile, no leukocytosis. Likely viral etiology  - 15 episodes of nonbilious, nonbloody vomiting episodes  - 5 episodes of loose stools.  No hematochezia  - Multiple family members with similar sx - mother and toddler nieces/nephews  - CT abdomen/pelvis not done due to lack of abdominal pain and tenderness on exam    Plan:  Supportive care on discharge, hydration and increase PO intake as tolerated    End stage renal disease on dialysis [facility])  Assessment & Plan  - MWF dialysis schedule, last session on Friday 11/24  - BUN 44, Cr 9.72 (baseline 7-8)  - Home med: PhosLo, Sensipar, Torsemide 100 mg qd  - Does not make urine at baseline  Completed dialysis today    Plan:  HOLD torsemide as patient is anuric  Discharge to home  Continue home PhosLo, Sensipar    Insomnia  Assessment & Plan  - Continue home trazodone 25 mg qhs prn    Anxiety  Assessment & Plan  - Continue home Lexapro 20 mg qd, Atarax 10 mg q6 prn    Hyperlipidemia  Assessment & Plan  - Continue home Atorvastatin 40 mg qd    Type 1 diabetes mellitus on insulin therapy ([address])  Assessment & Plan  - HbgA1c 6.0% on 10/28/23  - Currently on insulin pump, unsure of the settings    Plan:  Continue home pump and monitoring    GERD (gastroesophageal reflux disease)  Assessment & Plan  - Continue famotidine 40 mg qd    Plan:  - Per pharmacy, continue famotidine 10 mg for renal dosing    Diabetic neuropathy (HCC)  Assessment & Plan  - Continue home gabapentin 200 mg qhs    Hypertension  Assessment & Plan  - Upon presentation, bp 216/105  - s/p 2 x IV labetalol 10 mg in the ED  - Home meds: Clonidine patch weekly, hydralazine 25 mg TID, labetalol 400 mg TID, lisinopril 80 mg qd, nifedipine 90 mg qd    Plan:  Continue all PTA home meds on discharge, except torsemide - patient is anuric. Medical Problems       Resolved Problems  Date Reviewed: 11/27/2023   None       Discharging Resident: Celeste Swann MD  Discharging Attending: No att. providers found  PCP: Reyes Finical, DO  Admission Date:   Admission Orders (From admission, onward)       Ordered        11/26/23 1538  INPATIENT ADMISSION  Once                          Discharge Date: 11/27/23    Consultations During Hospital Stay:  Nephrology    Procedures Performed:   Dialysis    Significant Findings / Test Results:   None    Incidental Findings:   None     Test Results Pending at Discharge (will require follow up): None     Outpatient Tests Requested:  None    Complications:  None    Reason for Admission: gastroenteritis    Hospital Course: Dianna Ferrer is a 34 y.o. male patient with PMH of HTN, HLD, Type 1 DM, ESRD on MWF HD since 2018, GERD, diabetic neuropathy, CVA in 2015 and 2018 who originally presented to the hospital on 11/26/2023 due to N/V/D for preceding 24 hours. He reported having 15 episodes of nonbilious, nonbloody emesis as well as 5 episodes of loose stools. Multiple family members with similar symptoms including mother and toddler nieces/nephews. He ate a normal dinner the night before admission. Denied abdominal pain, fever, hematemesis, hematochezia. No recent hospitalization, recent antibiotic use, or Hx of C diff. Patient was hypertensive to low 200s SBP on admission, but he stated he had not taken his daily BP medications due to nausea with PO intake. He was monitored overnight and received dialysis on his normal schedule. Patient with improved symptoms and PO intake the next day and better BP with his medications. He was discharged in stable condition with instructions to follow-up with his PCP and nephrology.  Torsemide was discontinued as he is anuric. Please see above list of diagnoses and related plan for additional information. Condition at Discharge: good    Discharge Day Visit / Exam:   Subjective:  Patient states he had some diarrhea this morning but otherwise feels much better. Denies nausea and vomiting. No CP, SOB, palpitations, HA, dysuria. Tolerated his HD session. He is tolerated PO intake. He stated his blood sugar was high while on the insulin offered by the hospital, but it normalized when he started using his insulin pump. Vitals: Blood Pressure: 141/69 (11/27/23 1540)  Pulse: 79 (11/27/23 1540)  Temperature: 98.9 °F (37.2 °C) (11/27/23 0814)  Temp Source: Oral (11/27/23 0814)  Respirations: 18 (11/27/23 1340)  Height: 5' 11" (180.3 cm) (11/26/23 1629)  Weight - Scale: 97.6 kg (215 lb 2.7 oz) (11/26/23 1629)  SpO2: 98 % (11/27/23 1540)  Exam:   Physical Exam  Constitutional:       General: He is not in acute distress. Appearance: Normal appearance. He is normal weight. He is not ill-appearing or toxic-appearing. HENT:      Head: Normocephalic and atraumatic. Mouth/Throat:      Mouth: Mucous membranes are moist.      Pharynx: No oropharyngeal exudate. Eyes:      General: No scleral icterus. Conjunctiva/sclera: Conjunctivae normal.   Cardiovascular:      Rate and Rhythm: Normal rate and regular rhythm. Pulses: Normal pulses. Heart sounds: No murmur heard. Pulmonary:      Effort: Pulmonary effort is normal. No respiratory distress. Breath sounds: No wheezing, rhonchi or rales. Abdominal:      General: Abdomen is flat. Bowel sounds are normal. There is no distension. Tenderness: There is no abdominal tenderness. There is no guarding. Musculoskeletal:      Cervical back: Neck supple. No tenderness. Right lower leg: Edema (trace) present. Left lower leg: Edema (trace) present. Lymphadenopathy:      Cervical: No cervical adenopathy.    Skin:     General: Skin is warm.      Capillary Refill: Capillary refill takes less than 2 seconds. Coloration: Skin is not jaundiced. Neurological:      General: No focal deficit present. Mental Status: He is alert and oriented to person, place, and time. Mental status is at baseline. Psychiatric:         Mood and Affect: Mood normal.         Behavior: Behavior normal.         Thought Content: Thought content normal.         Judgment: Judgment normal.          Discussion with Family: Updated  (father) at bedside. Discharge instructions/Information to patient and family:   See after visit summary for information provided to patient and family. Provisions for Follow-Up Care:  See after visit summary for information related to follow-up care and any pertinent home health orders. Mobility at time of Discharge:   Basic Mobility Inpatient Raw Score: 23  JH-HLM Goal: 7: Walk 25 feet or more  JH-HLM Achieved: 7: Walk 25 feet or more  HLM Goal achieved. Continue to encourage appropriate mobility. Disposition:   Home    Planned Readmission: No    Discharge Medications:  See after visit summary for reconciled discharge medications provided to patient and/or family.       **Please Note: This note may have been constructed using a voice recognition system**

## 2023-11-27 NOTE — PLAN OF CARE
Target UF Goal  3.5  L as tolerated. Patient dialyzing for 3 hours on 4 K bath for serum K of  3.8  per protocol. Treatment plan reviewed with Nephrology. Post-Dialysis RN Treatment Note    Blood Pressure:  Pre 168/85 mm/Hg  Post 162/93 mmHg   EDW  94 kg    Weight:  Pre 97.4 kg   Post 93.8 kg   Mode of weight measurement: Standing Scale   Volume Removed  3500 ml    Treatment duration 3 hours   NS given  No    Treatment shortened?  Yes, describe: Staffing    Medications given during Rx None Reported   Estimated Kt/V  1.19   Access type: AV fistula   Access Issues: No    Report called to primary nurse   Yes, Kadie Hurtado RN         Problem: METABOLIC, FLUID AND ELECTROLYTES - ADULT  Goal: Electrolytes maintained within normal limits  Description: INTERVENTIONS:  - Monitor labs and assess patient for signs and symptoms of electrolyte imbalances  - Administer electrolyte replacement as ordered  - Monitor response to electrolyte replacements, including repeat lab results as appropriate  - Instruct patient on fluid and nutrition as appropriate  Outcome: Progressing  Goal: Fluid balance maintained  Description: INTERVENTIONS:  - Monitor labs   - Monitor I/O and WT  - Instruct patient on fluid and nutrition as appropriate  - Assess for signs & symptoms of volume excess or deficit  Outcome: Progressing

## 2023-11-27 NOTE — PLAN OF CARE
Problem: PAIN - ADULT  Goal: Verbalizes/displays adequate comfort level or baseline comfort level  Description: Interventions:  - Encourage patient to monitor pain and request assistance  - Assess pain using appropriate pain scale  - Administer analgesics based on type and severity of pain and evaluate response  - Implement non-pharmacological measures as appropriate and evaluate response  - Consider cultural and social influences on pain and pain management  - Notify physician/advanced practitioner if interventions unsuccessful or patient reports new pain  Outcome: Completed     Problem: GASTROINTESTINAL - ADULT  Goal: Minimal or absence of nausea and/or vomiting  Description: INTERVENTIONS:  - Administer IV fluids if ordered to ensure adequate hydration  - Maintain NPO status until nausea and vomiting are resolved  - Nasogastric tube if ordered  - Administer ordered antiemetic medications as needed  - Provide nonpharmacologic comfort measures as appropriate  - Advance diet as tolerated, if ordered  - Consider nutrition services referral to assist patient with adequate nutrition and appropriate food choices  Outcome: Completed  Goal: Maintains adequate nutritional intake  Description: INTERVENTIONS:  - Monitor percentage of each meal consumed  - Identify factors contributing to decreased intake, treat as appropriate  - Assist with meals as needed  - Monitor I&O, weight, and lab values if indicated  - Obtain nutrition services referral as needed  Outcome: Completed  Goal: Establish and maintain optimal ostomy function  Description: INTERVENTIONS:  - Assess bowel function  - Encourage oral fluids to ensure adequate hydration  - Administer IV fluids if ordered to ensure adequate hydration   - Administer ordered medications as needed  - Encourage mobilization and activity  - Nutrition services referral to assist patient with appropriate food choices  - Assess stoma site  - Consider wound care consult   Outcome: Completed     Problem: METABOLIC, FLUID AND ELECTROLYTES - ADULT  Goal: Electrolytes maintained within normal limits  Description: INTERVENTIONS:  - Monitor labs and assess patient for signs and symptoms of electrolyte imbalances  - Administer electrolyte replacement as ordered  - Monitor response to electrolyte replacements, including repeat lab results as appropriate  - Instruct patient on fluid and nutrition as appropriate  Outcome: Completed  Goal: Fluid balance maintained  Description: INTERVENTIONS:  - Monitor labs   - Monitor I/O and WT  - Instruct patient on fluid and nutrition as appropriate  - Assess for signs & symptoms of volume excess or deficit  Outcome: Completed

## 2023-11-27 NOTE — DISCHARGE INSTR - AVS FIRST PAGE
Dear Wandy Barnhart,     It was our pleasure to care for you here at [facility]. It is our hope that we were always able to exceed the expected standards for your care during your stay. You were hospitalized due to viral gastroenteritis. You were cared for on the [facility] 3rd floor by Lori Crawford MD under the service of Harriet Phan MD with the [facility] who covers for your primary care physician (PCP), Meagan Shelby DO, while you were hospitalized. If you have any questions or concerns related to this hospitalization, you may contact us at [phone]. For follow up as well as any medication refills, we recommend that you follow up with your primary care physician. A registered nurse will reach out to you by phone within a few days after your discharge to answer any additional questions that you may have after going home. However, at this time we provide for you here, the most important instructions / recommendations at discharge:     Notable Medication Adjustments -   STOP taking torsemide 100 mg  Continue taking other medications as prescribed and continuing using your insulin via pump  Testing Required after Discharge -   None  ** Please contact your PCP to request testing orders for any of the testing recommended here **  Important follow up information -   Follow-up with your PCP within 1 week of discharge to discuss your hospital course  Talk to your nephrologist about the need for torsemide. If you do not make urine, you should not be taking this medication. Other Instructions -   If you continue to have diarrhea or nausea/vomiting, call your PCP right away or go to your nearest emergency room if you do not feel well. Continue washing your hands thoroughly after using the bathroom and handling food. Do not share any food with anyone while you are ill.   Please review this entire after visit summary as additional general instructions including medication list, appointments, activity, diet, any pertinent wound care, and other additional recommendations from your care team that may be provided for you.       Sincerely,     Radha Arita MD

## 2023-11-27 NOTE — CONSULTS
Consultation - Nephrology   [facility] [place] 34 y.o. male MRN: [id number]  Unit/Bed#: S -01 Encounter: [id number]    ASSESSMENT/PLAN:  ESRD on maintenance HD [email or web address] (Toby):  -native disease:  due to DM, HTN  -last treatment 11/24/2023 for full treatment at outpatient center  -EDW: 94 kg  -Plan for HD today will plan UF 2.5-3L  -recommend decreasing IVF or d/c  -next treatment 11/29/2023. Will plan to continue regular Monday, Wednesday, Friday schedule during hospitalization. -d/w Dr. Ileana Rodriguez    Access: LUE AVF  -+thrill/bruit   -site clear  -continue to use AVF for hemodialysis access    HTN/volume status:  -BP hypertensive, likely volume mediated  -volume status mild hypervolemia  -maintain goal BP <140/90  -home medications: Clonidine 0.3 mg patch weekly, hydralazine 25 mg 3 times daily, nifedipine 90 mg daily, lisinopril 40 mg daily, labetalol 400 mg 3 times daily, torsemide 100 mg daily  -current medications: Clonidine patch 0.3 mg q. weeks, hydralazine 25 mg 3 times daily, labetalol 400 mg 3 times daily, lisinopril 80 mg daily, nifedipine 90 mg daily, torsemide 100 mg daily  -On NSS at 75 ml/hr. followed closely in the setting of ESRD  -continue UF with dialysis as BP tolerates. Plan UF 2.5-3L  -continue to monitor    Anemia in CKD:  -Hbg 9.3, below goal.  Goal Hgb >10  -On Mircera 50 mcg every 2 weeks and Venofer 100 mg IV weekly as outpatient  -continue to monitor  -transfuse for Hgb <7.0  -Management per primary team    Bone mineral disease of renal origin:  -Secondary hyperparathyroidism: Continue Sensipar 120 mg daily  -Hyperphosphatemia: On PhosLo 2001 mg 3 times daily, Velphoro 500 mg 2 tablets 3 times daily. Continue Phoslo. Low phosphorus diet  -Renal diet  -continue to monitor phosphorus, PTH, calcium, magnesium as outpatient    Suspected gastroenteritis:  -p/w intractable N/V and diarrhea  -symptoms improving. Tolerating clear liquid diet  -Currently on IVF at 75 ml/hr.  recommend decreasing rate or holding IVF now in setting of ESRD with chronic anuria    -stool studies pending  -Management per primary team    DM I:  -stable  -HgbA1C 6.0  -On insulin pump prehospital  -continue to optimize glycemic control  -management per primary team      HISTORY OF PRESENT ILLNESS:  Requesting Physician: Keerthi Laureano MD  Reason for Consult: ESRD on HD    Debra Bennett is a 34y.o. year old male with ESRD on HD MWF via LUE AVF at [facility], previously on PD complicated by recurrent peritonitis, HTN, HLD, DM 1 with neuropathy, nephropathy and retinopathy, CVA, GERD, C667T MTHFR mutation who was [facility] after presenting with acute onset nausea, vomiting, diarrhea x24 hours. Patient reports family members with similar symptoms. Patient reports poor oral intake secondary to GI symptoms. Patient hypertensive on presentation to ED. patient received 1L bolus of NSS in ED and IVF initiated with NSS at 75 mL/hr. patient reports he is chronically anuric but remains on torsemide. GI symptoms improving and patient denies vomiting since admission. 1 bout of diarrhea this a.m. Tolerating clear liquid diet. A renal consultation is requested today for assistance in the management of ESRD. Debra Bennett is a known ESRD patient who undergoes maintenance hemodialysis at [facility] on Monday, Wednesday, Friday. Last hemodialysis 11/24/2023 for full treatment at outpatient dialysis center. PAST MEDICAL HISTORY:  Past Medical History:   Diagnosis Date    Acute kidney injury ([address])     Ambulates with cane     Anuria     Anxiety     Cellulitis of right elbow 03/31/2021    Chronic kidney disease     Depression     Diabetes mellitus ([address])     Diarrhea     Emesis 10/24/2020    End stage renal disease ([address]) 02/11/2018    Formatting of this note might be different from the original. Last Assessment & Plan:  Secondary to DM. On nightly PD. Followed by Nephro.   Patient considering transplant for kidney and pancreas through [address] of this note might be different from the original. Last Assessment & Plan:  Formatting of this note might be different from the original. Lab Results  Component Value Date   EGFR     Esophagitis 7/21/2015    Falls     Gastroparesis     GERD (gastroesophageal reflux disease)     History of shingles 2010    History of transfusion 02/2018    no adverse reaction    Hyperlipidemia     Hyperphosphatemia     Hypertension     Hypoglycemia 7/15/2022    Itching     Mastoiditis of right side 07/15/2022    Muscle weakness     general unsteadiness    Obesity (BMI 30.0-34.9) 09/09/2019    Orthostatic hypotension 10/25/2020    Peripheral polyneuropathy 11/20/2019    PONV (postoperative nausea and vomiting) 01/26/2018    Protein-calorie malnutrition ([address]) 11/23/2020    Recurrent peritonitis ([address]) due to peritoneal dialysis catheter 07/31/2020    Retinopathy     Seizures ([address])     early 2020 - one time    Skin abnormality     some dime size areas where skin was scratched from itching    Spontaneous bacterial peritonitis ([address]) 10/19/2020    Squamous cell skin cancer     left temple    Stroke ([address])     x2 - off balance/no driving/fatigue    Swelling of both lower extremities     Traumatic onycholysis 7/21/2022    Vomiting     Wears glasses        PAST SURGICAL HISTORY:  Past Surgical History:   Procedure Laterality Date    CARDIAC ELECTROPHYSIOLOGY PROCEDURE N/A 9/21/2023    Procedure: Cardiac loop recorder explant;  Surgeon: Roman Blas MD;  Location: [facility];   Service: Cardiology    CARDIAC LOOP RECORDER  05/2018    COLONOSCOPY      EGD      EYE SURGERY Right     IR AV FISTULAGRAM/GRAFTOGRAM  02/23/2021    IR TUNNELED CENTRAL LINE PLACEMENT  02/16/2021    IR TUNNELED DIALYSIS CATHETER PLACEMENT  11/18/2020    IR TUNNELED DIALYSIS CATHETER REMOVAL  02/12/2021    IR TUNNELED DIALYSIS CATHETER REMOVAL  03/11/2021    MOHS SURGERY Left 12/14/2022    Left temple with Dr. Talya Moreno PERITONEAL CATHETER INSERTION N/A 2018    Procedure: UNROOF PD CATHETER;  Surgeon: Pam Julien DO;  Location: [facility];  Service: General    NC ARTERIOVENOUS ANASTOMOSIS OPEN DIRECT Left 2020    Procedure: CREATION FISTULA  ARTERIOVENOUS (AV) - LEFT WRIST;  Surgeon: Karl Sapp MD;  Location: [facility];  Service: Vascular    NC ESOPHAGOGASTRODUODENOSCOPY TRANSORAL DIAGNOSTIC N/A 2019    Procedure: ESOPHAGOGASTRODUODENOSCOPY (EGD); Surgeon: Shana Ratliff MD;  Location: [facility];   Service: Gastroenterology    NC LAPS INSERTION TUNNELED INTRAPERITONEAL CATHETER N/A 2018    Procedure: LAPAROSCOPIC PD CATHETER PLACEMENT;  Surgeon: Pam Julien DO;  Location: [facility];  Service: General    NC REMOVAL TUNNELED INTRAPERITONEAL CATHETER N/A 2020    Procedure: REMOVAL CATHETER PERITONEAL DIALYSIS;  Surgeon: Mae Patel MD;  [facility]: [facility];  Service: General    TONSILLECTOMY      UPPER GASTROINTESTINAL ENDOSCOPY         ALLERGIES:  Allergies   Allergen Reactions    Sulfa Antibiotics Rash       SOCIAL HISTORY:  Social History     Substance and Sexual Activity   Alcohol Use Not Currently    Comment: occassionally     Social History     Substance and Sexual Activity   Drug Use Yes    Types: Marijuana    Comment: medical marijuana     Social History     Tobacco Use   Smoking Status Former    Packs/day: 0.50    Years: 12.00    Total pack years: 6.00    Types: Cigarettes    Quit date: 2018    Years since quittin.8   Smokeless Tobacco Never   Tobacco Comments    quit 2018       FAMILY HISTORY:  Family History   Problem Relation Age of Onset    Breast cancer Mother     Hypertension Mother     Hyperlipidemia Father     Hypertension Father     Leukemia Maternal Grandmother     Hyperlipidemia Maternal Grandfather     Hypertension Maternal Grandfather     Hyperlipidemia Paternal Grandmother     Hypertension Paternal Grandmother     Heart disease Paternal Grandfather cardiac disorder    Diabetes Paternal Grandfather        MEDICATIONS:    Current Facility-Administered Medications:     acetaminophen (TYLENOL) tablet 650 mg, 650 mg, Oral, Q6H PRN, Diantha Nithya, DO    aspirin (ECOTRIN LOW STRENGTH) EC tablet 81 mg, 81 mg, Oral, Daily, Shreyan Riley, DO, 81 mg at 11/27/23 0819    atorvastatin (LIPITOR) tablet 40 mg, 40 mg, Oral, QPM, Shreyan Riley, DO, 40 mg at 11/26/23 1701    calcium acetate (PHOSLO) capsule 2,001 mg, 2,001 mg, Oral, TID With Meals, Diantha Nithya, DO, 2,001 mg at 11/27/23 0819    cinacalcet (SENSIPAR) tablet 120 mg, 120 mg, Oral, Daily, Shreyan Riley, DO, 120 mg at 11/27/23 7375    cloNIDine (CATAPRES-TTS-3) 0.3 mg/24 hr TD weekly patch, 0.3 mg, Transdermal, Weekly, Gopal Callaway MD, 0.3 mg at 11/26/23 2148    escitalopram (LEXAPRO) tablet 20 mg, 20 mg, Oral, Daily, Shreyan Riley, DO, 20 mg at 11/27/23 0819    famotidine (PEPCID) tablet 10 mg, 10 mg, Oral, Daily, Shreyan Riley, DO, 10 mg at 11/27/23 0819    gabapentin (NEURONTIN) capsule 200 mg, 200 mg, Oral, HS, Shreyan Riley, DO, 200 mg at 11/26/23 2148    hydrALAZINE (APRESOLINE) tablet 25 mg, 25 mg, Oral, TID, Escobar Ibanez MD, 25 mg at 11/27/23 0819    hydrOXYzine HCL (ATARAX) tablet 10 mg, 10 mg, Oral, Q6H PRN, Shreyan Riley, DO    insulin aspart (NovoLOG) FOR PUMP REFILLS 100 Units, 100 Units, Subcutaneous Insulin Pump, Daily PRN, Gracy Schlatter, MD    insulin lispro (HumaLOG) 100 units/mL subcutaneous injection 1-6 Units, 1-6 Units, Subcutaneous, 4x Daily (PC & HS), 2.3 Units at 11/26/23 2122 **AND** Fingerstick Glucose (POCT), , , 4x Daily AC and at bedtime, Brigida Riley DO    labetalol (NORMODYNE) injection 10 mg, 10 mg, Intravenous, Q4H PRN, Luzmaria Barriga DO    labetalol (NORMODYNE) tablet 400 mg, 400 mg, Oral, TID, Escobar Ibanez MD, 400 mg at 11/27/23 0819    lisinopril (ZESTRIL) tablet 80 mg, 80 mg, Oral, Daily, Brigida Riley DO, 80 mg at 11/27/23 4481 NIFEdipine (PROCARDIA XL) 24 hr tablet 90 mg, 90 mg, Oral, Daily, Maisha Causey MD, 90 mg at 11/27/23 0819    sodium chloride 0.9 % infusion, 75 mL/hr, Intravenous, Continuous, Ebony Jeronimo MD, Last Rate: 75 mL/hr at 11/26/23 2056, 75 mL/hr at 11/26/23 2056    torsemide (DEMADEX) tablet 100 mg, 100 mg, Oral, Daily, Sai Corey MD, 100 mg at 11/27/23 0819    traZODone (DESYREL) tablet 25 mg, 25 mg, Oral, HS PRN, Jenny Nascimento, DO    trimethobenzamide (TIGAN) IM injection 200 mg, 200 mg, Intramuscular, Q6H PRN, Jenny Nascimento,     REVIEW OF SYSTEMS:  A complete 10 point review of systems was performed and found to be negative unless otherwise noted below or in the HPI. General: No fevers, chills. Cardiovascular: No chest pain, shortness of breath, palpitations, leg edema. Respiratory: No cough, sputum production, shortness of breath. Gastrointestinal: No nausea, vomiting, abdominal pain, diarrhea. Genitourinary: No hematuria. PHYSICAL EXAM:  Current Weight: Weight - Scale: 97.6 kg (215 lb 2.7 oz)  First Weight: Weight - Scale: 96.7 kg (213 lb 3 oz)  Vitals:    11/26/23 2144 11/27/23 0026 11/27/23 0323 11/27/23 0814   BP: (!) 182/90 (!) 174/91 170/84 (!) 180/83   BP Location:    Right arm   Pulse: 90 87 83 76   Resp:    18   Temp:    98.9 °F (37.2 °C)   TempSrc:    Oral   SpO2: 97% 96% 96% 94%   Weight:       Height:         No intake or output data in the 24 hours ending 11/27/23 0829  General:  Awake, alert, appears comfortable and in no acute distress. Nontoxic. Skin:  No rash, warm, good skin turgor   Eyes:  PERRL, EOMI, sclerae nonicteric.  no periorbital edema   ENT:  Moist mucous membranes  Neck:  Trachea midline, symmetric. No JVD. No carotid bruits. Chest:  Clear to auscultation bilaterally without wheezes, crackles or rhonchi  CVS:  Regular rate and rhythm without murmur, gallop or rub.   S1 and S2 identified and normal.  No S3, S4.   Abdomen:  Soft, nontender, nondistended without masses. Normal bowel sounds x 4 quadrants. No bruit. Extremities:  Warm, pink, motor and sensory intact and well perfused. No cyanosis, pallor. No BLE edema. Neuro:  Awake, alert, oriented x3. Grossly intact  Psych:  Appropriate affect. Mentating appropriately. Normal mental status exam  Access: LUE AVF + thrill, bruit. Site clear. No erythema, drainage or hematoma       Invasive Devices:  None     Lab Results:   Results from last 7 days   Lab Units 11/27/23  0327 11/26/23  1243   WBC Thousand/uL 6.54 8.38   HEMOGLOBIN g/dL 9.3* 11.1*   HEMATOCRIT % 28.5* 34.1*   PLATELETS Thousands/uL 176 225   SODIUM mmol/L 138 142   POTASSIUM mmol/L 3.8 4.0   CHLORIDE mmol/L 99 99   CO2 mmol/L 26 28   BUN mg/dL 52* 44*   CREATININE mg/dL 10.98* 9.72*   CALCIUM mg/dL 7.9* 8.2*   MAGNESIUM mg/dL  --  2.5   PHOSPHORUS mg/dL  --  5.5*   ALK PHOS U/L 60 76   ALT U/L 12 12   AST U/L 18 17     Lab Results   Component Value Date    .2 (H) 10/22/2020    CALCIUM 7.9 (L) 11/27/2023    PHOS 5.5 (H) 11/26/2023   Imaging study:  CXR 11/26/2023:  Imaging study reviewed. Images personally reviewed. No pneumothorax, pleural effusion or evidence of pulmonary edema. No acute cardiopulmonary disease    EMR, including Care Everywhere, Epic,  [facility] carrol and outpatient notes reviewed. I have personally reviewed the blood work as stated above and in my note. I have personally reviewed CXR imaging studies. I have personally reviewed primary medical service and previous nephrology note.

## 2023-11-27 NOTE — TREATMENT PLAN
HEMODIALYSIS PROCEDURE NOTE  The patient was seen and examined on hemodialysis. Patient tolerating procedure with stable hemodynamics.   Discussed with dialysis nurse at bedside and we agreed to proceed with UF3.5L as tolerated  Time: 3 hours  Sodium: 137 Blood flow: 450   Dialyzer: F180 Potassium: 4K Dialysate flow: 500   Access: LUE AVF Bicarbonate: 35 Ultrafiltration goal: 3.5L   Medications on HD: none

## 2023-11-27 NOTE — QUICK NOTE
At the time of admission, pt received D5 IVF, repeat BG on the pt CGM nearly 400. Pt was agitated with nurse about his BG levels. Requests if he can use his own insulin pump. At bedside, I explained the options to continue with SSI and lantus or to continue his pump. Pt wanted to use his insulin pump despite all the education and options I provided. He is a carb counter, uses CGM and insulin pump (Novolog) as sliding scale. I filled the Insulin assessment form and hand it over to nurse. He consumes ,  20 carbs at Breakfast requiring 4-5 units of insulin,   <20 carbs for Lunch, doesn't recall accurate insulin requirements. >30 carbs for Supper and doesn't recall accurate insulin requirements. Reviewed his Dexacom and pump carrol, unable to find the accurate insulin req at this time. Discussed with nurse to perform q4 checks and to document accurate carbs and insulin requirements.      Plan to order the pump with accurate dosing parameters in the am .

## 2023-11-28 ENCOUNTER — TELEPHONE (OUTPATIENT)
Dept: ENDOCRINOLOGY | Facility: CLINIC | Age: 38
End: 2023-11-28

## 2023-11-28 ENCOUNTER — TRANSITIONAL CARE MANAGEMENT (OUTPATIENT)
Dept: INTERNAL MEDICINE CLINIC | Facility: CLINIC | Age: 38
End: 2023-11-28

## 2023-11-28 DIAGNOSIS — Z71.89 COMPLEX CARE COORDINATION: Primary | ICD-10-CM

## 2023-11-28 LAB — MRSA NOSE QL CULT: NORMAL

## 2023-11-29 ENCOUNTER — PATIENT OUTREACH (OUTPATIENT)
Dept: CASE MANAGEMENT | Facility: HOSPITAL | Age: 38
End: 2023-11-29

## 2023-11-29 NOTE — PROGRESS NOTES
HRR referral received and chart review performed. Pt recently hospitalized 11/26-11/27 with gastroenteritis. Per chart pt Type 1 diabetic and ESRD on dialysis MWF. Call placed to [facility] for care management. [facility] states that he is doing much better. He is no longer vomiting and is able to keep foods and liquids down. He confirms dialysis schedule and is currently at dialysis session. [facility] states that he has all medications and declines need to review. Paulo uses CGM to manage diabetes. States when he was in the hospital they were not using CGM and blood sugar was very high, in 400's. Requested to use CGM in hospital, blood sugars have stabilized. Has had no issues with high sugars in the past. Notified pt per chart [facility] endocrinology called to schedule follow up. [facility] states that he currently sees [facility] endocrine and is happy with his care there. Confirmed upcoming appointment with PCP 11/30. Explained care management. [facility] states that he feels he is able to manage his health without issues. Also states he has a great family support system. He is appreciative of the call. Assured pt CM is available to him if needed. Daria Catalan RN CM removed from care team at this time and note routed to same.

## 2023-11-30 ENCOUNTER — OFFICE VISIT (OUTPATIENT)
Dept: INTERNAL MEDICINE CLINIC | Facility: CLINIC | Age: 38
End: 2023-11-30
Payer: MEDICARE

## 2023-11-30 VITALS
HEIGHT: 71 IN | OXYGEN SATURATION: 96 % | SYSTOLIC BLOOD PRESSURE: 160 MMHG | RESPIRATION RATE: 17 BRPM | BODY MASS INDEX: 29.12 KG/M2 | WEIGHT: 208 LBS | HEART RATE: 81 BPM | DIASTOLIC BLOOD PRESSURE: 90 MMHG

## 2023-11-30 DIAGNOSIS — S93.501A SPRAIN OF RIGHT GREAT TOE, INITIAL ENCOUNTER: ICD-10-CM

## 2023-11-30 DIAGNOSIS — K52.9 GASTROENTERITIS: Primary | ICD-10-CM

## 2023-11-30 DIAGNOSIS — N18.6 END STAGE RENAL DISEASE ON DIALYSIS (HCC): ICD-10-CM

## 2023-11-30 DIAGNOSIS — I10 PRIMARY HYPERTENSION: ICD-10-CM

## 2023-11-30 DIAGNOSIS — Z99.2 END STAGE RENAL DISEASE ON DIALYSIS (HCC): ICD-10-CM

## 2023-11-30 PROBLEM — D72.19 EOSINOPHILIC LEUKOCYTOSIS: Status: RESOLVED | Noted: 2020-11-04 | Resolved: 2023-11-30

## 2023-11-30 PROCEDURE — 99495 TRANSJ CARE MGMT MOD F2F 14D: CPT | Performed by: INTERNAL MEDICINE

## 2023-11-30 NOTE — ASSESSMENT & PLAN NOTE
-elevated BP at hospital presentation requiring IV labetalol 10mg x2  -at discharge, torsemide discontinued but pt restarted at home  -home bp have been at his baseline  -continue clonidine 0.3mg qweekly patch, hydralazine 25mg TID, labetalol 200mg tow tabs TID, lisinopri l40mg two tabs daily and nifedipine XL 90mg daily  -follow up Renal

## 2023-11-30 NOTE — PROGRESS NOTES
Assessment/Plan:    Problem List Items Addressed This Visit     Hypertension     -elevated BP at hospital presentation requiring IV labetalol 10mg x2  -at discharge, torsemide discontinued but pt restarted at home  -home bp have been at his baseline  -continue clonidine 0.3mg qweekly patch, hydralazine 25mg TID, labetalol 200mg tow tabs TID, lisinopri l40mg two tabs daily and nifedipine XL 90mg daily  -follow up Renal         Gastroenteritis - Primary     -viral  -received supportive care with IVF  -resolved          End stage renal disease on dialysis ([address])     -dialysis MWF  -reports he is currently at his dry weight of 94kg  -follow up Renal        Other Visit Diagnoses     Sprain of right great toe, initial encounter        -R foot xray neg for fx  -continue icing and elevating foot          Subjective:      Patient ID: Katherine Estrada is a 34 y.o. male. HPI  TCM Call     Date and time call was made  11/28/2023  8:23 AM    Hospital care reviewed  Records reviewed    Patient was hospitialized at  [id number] [facility] [address]  [facility]     Date of Admission  11/26/23    Date of discharge  11/27/23    Diagnosis  Gastroenteritis    Disposition  Home    Current Symptoms  None      TCM Call     Post hospital issues  None    Should patient be enrolled in anticoag monitoring? No    Scheduled for follow up?   Yes    Patients specialists  Endocrinologist    Did you obtain your prescribed medications  Yes    Do you need help managing your prescriptions or medications  No    Is transportation to your appointment needed  No    I have advised the patient to call PCP with any new or worsening symptoms  [address] [facility] members    Support System  Family    The type of support provided  Emotional; Physical    Do you have social support  Yes, as much as I need    Are you recieving any outpatient services  No    Are you recieving home care services  No    Are you using any [employer] resources  No    Current waiver services  No    Have you fallen in the last 12 months  No    Interperter language line needed  No    Counseling  Patient    Comments  Patient appointment shceduled for 4/15/21        He was hospitalized at [facility] 11/26-11/27/23 for one day h/o n/v/d. Suspected to be viral in etiology as family members also were affected. He received supportive care with fluids, torsemide was withheld at discharge. He was hypertensive at time of evaluation and received IV labetalol x2. He also had HD during his hospitalization, he is normally scheduled MWF. He restarted torsemide. Home BP have been at his baseline though he recently took bp meds prior to presentation. He is eating but not a lot. No further vomiting, diarrhea or fever. He is at his dry weight. He denies SOB. Has chronic dizziness. His R foot is improved from last evaluation, is able to bear weight more but continues to have discomfort when walking. Throbbing no longer occurs in the evening and therefore he is able to sleep.     The following portions of the patient's history were reviewed and updated as appropriate: allergies, current medications, past family history, past medical history, past social history, past surgical history and problem list.      Current Outpatient Medications:   •  aspirin (ECOTRIN LOW STRENGTH) 81 mg EC tablet, Take 81 mg by mouth daily, Disp: , Rfl:   •  atorvastatin (LIPITOR) 40 mg tablet, Take 1 tablet (40 mg total) by mouth every evening, Disp: 90 tablet, Rfl: 3  •  b complex vitamins capsule, Take 1 capsule by mouth daily before lunch , Disp: , Rfl:   •  B-D ULTRAFINE III SHORT PEN 31G X 8 MM MISC, USE 1 PEN NEEDLE 8 TIMES DAILY, Disp: 100 each, Rfl: 47  •  calcium acetate (PHOSLO) capsule, TAKE 3 CAPSULES BY MOUTH WITH MEALS, Disp: , Rfl:   •  cinacalcet (SENSIPAR) 60 MG tablet, Take 120 mg by mouth daily 2 tab daily , Disp: , Rfl:   •  cloNIDine (CATAPRES-TTS-3) 0.3 mg/24 hr, 1 patch once a week, Disp: , Rfl:   •  escitalopram (LEXAPRO) 20 mg tablet, Take 1 tablet (20 mg total) by mouth daily, Disp: 90 tablet, Rfl: 2  •  famotidine (PEPCID) 40 MG tablet, Take 1 tablet (40 mg total) by mouth in the morning, Disp: 90 tablet, Rfl: 1  •  gabapentin (NEURONTIN) 100 mg capsule, TAKE 2 CAPSULES BY MOUTH AT BEDTIME, Disp: 180 capsule, Rfl: 1  •  GLUCAGON EMERGENCY 1 MG injection, , Disp: , Rfl: 3  •  Gvoke HypoPen 1-Pack 1 MG/0.2ML SOAJ, , Disp: , Rfl:   •  hydrALAZINE (APRESOLINE) 25 mg tablet, Take 1 tablet (25 mg total) by mouth 3 (three) times a day, Disp: 270 tablet, Rfl: 0  •  hydrOXYzine HCL (ATARAX) 10 mg tablet, Take 1 tablet (10 mg total) by mouth every 6 (six) hours as needed for itching or anxiety, Disp: 30 tablet, Rfl: 3  •  Insulin Disposable Pump (Omnipod 5 G6 Intro, Gen 5,) KIT, , Disp: , Rfl:   •  Insulin Disposable Pump (Omnipod 5 G6 Pod, Gen 5,) MISC, , Disp: , Rfl:   •  labetalol (NORMODYNE) 200 mg tablet, TAKE 2 TABLETS BY MOUTH THREE TIMES DAILY, Disp: 180 tablet, Rfl: 0  •  lisinopril (ZESTRIL) 40 mg tablet, Take 80 mg by mouth daily, Disp: , Rfl:   •  mupirocin (BACTROBAN) 2 % ointment, Apply topically 3 (three) times a day Until lesion on right hand heals. , Disp: 30 g, Rfl: 4  •  NIFEdipine (PROCARDIA XL) 90 mg 24 hr tablet, Take 1 tablet (90 mg total) by mouth daily, Disp: 30 tablet, Rfl: 0  •  NovoLOG 100 UNIT/ML injection, , Disp: , Rfl:   •  ondansetron (ZOFRAN) 4 mg tablet, Take 1 tablet (4 mg total) by mouth every 8 (eight) hours as needed for nausea or vomiting, Disp: 90 tablet, Rfl: 0  •  Patiromer Sorbitex Calcium (VELTASSA PO), Take 5 g by mouth once a week, Disp: , Rfl:   •  saccharomyces boulardii (FLORASTOR) 250 mg capsule, Take 250 mg by mouth daily, Disp: , Rfl:   •  SPS 15 GM/60ML suspension, TAKE 60 ML BY MOUTH SINGLE DOSE FOR EMERGENCY USE DURING MISSED HEMODIALYSIS, Disp: , Rfl:   •  traZODone (DESYREL) 50 mg tablet, Take 0.5 tablets (25 mg total) by mouth daily at bedtime as needed for sleep, Disp: 30 tablet, Rfl: 3  •  triamcinolone (KENALOG) 0.1 % ointment, Apply topically 2 (two) times a day For up to 14 days on the itchy areas. Avoid groin, underarms and face. It is important to take at least 1 week break between uses. , Disp: 454 g, Rfl: 0  •  Velphoro 500 MG CHEW, CRUSH OR CHEW AND SWALLOW 2 TABLETS 3 TIMES A DAY WITH MEALS, Disp: , Rfl:     Review of Systems   Constitutional:  Positive for activity change, appetite change and unexpected weight change. Negative for fever. Respiratory:  Negative for cough, shortness of breath and wheezing. Cardiovascular:  Negative for chest pain, palpitations and leg swelling. Gastrointestinal:  Positive for nausea. Negative for abdominal pain, constipation, diarrhea and vomiting. Genitourinary:         Oliguric   Musculoskeletal:  Positive for arthralgias and joint swelling. Skin:  Positive for color change. Negative for rash. Neurological:  Positive for dizziness (chronic) and numbness. Negative for headaches. Psychiatric/Behavioral:  Negative for sleep disturbance. The patient is nervous/anxious. Objective:    /90 (BP Location: Right arm, Patient Position: Sitting, Cuff Size: Standard)   Pulse 81   Resp 17   Ht 5' 11" (1.803 m)   Wt 94.3 kg (208 lb)   SpO2 96%   BMI 29.01 kg/m²      Physical Exam  Vitals reviewed. Constitutional:       General: He is not in acute distress. Cardiovascular:      Rate and Rhythm: Normal rate and regular rhythm. Pulses: Normal pulses. Heart sounds: Normal heart sounds. Pulmonary:      Effort: Pulmonary effort is normal. No respiratory distress. Breath sounds: Normal breath sounds. No wheezing. Musculoskeletal:      Right lower leg: No edema. Left lower leg: No edema. Right foot: Deformity present. Feet:      Right foot:      Skin integrity: No warmth.       Comments: Ecchymoses at base of R 3-5toes, erythema and swelling of great toe  Skin:     Findings: Bruising present. Neurological:      Mental Status: He is alert.

## 2023-12-01 LAB
BACTERIA BLD CULT: NORMAL
BACTERIA BLD CULT: NORMAL

## 2023-12-15 DIAGNOSIS — K21.9 GASTROESOPHAGEAL REFLUX DISEASE WITHOUT ESOPHAGITIS: ICD-10-CM

## 2023-12-15 RX ORDER — FAMOTIDINE 40 MG/1
40 TABLET, FILM COATED ORAL EVERY MORNING
Qty: 90 TABLET | Refills: 0 | Status: SHIPPED | OUTPATIENT
Start: 2023-12-15

## 2023-12-26 DIAGNOSIS — E10.42 DIABETIC POLYNEUROPATHY ASSOCIATED WITH TYPE 1 DIABETES MELLITUS (HCC): ICD-10-CM

## 2023-12-26 RX ORDER — GABAPENTIN 100 MG/1
CAPSULE ORAL
Qty: 180 CAPSULE | Refills: 1 | Status: ON HOLD | OUTPATIENT
Start: 2023-12-26

## 2024-01-03 ENCOUNTER — HOSPITAL ENCOUNTER (EMERGENCY)
Facility: HOSPITAL | Age: 39
Discharge: HOME | DRG: 682 | End: 2024-01-03
Attending: EMERGENCY MEDICINE
Payer: MEDICARE

## 2024-01-03 ENCOUNTER — APPOINTMENT (EMERGENCY)
Dept: RADIOLOGY | Facility: HOSPITAL | Age: 39
DRG: 682 | End: 2024-01-03
Payer: MEDICARE

## 2024-01-03 VITALS
DIASTOLIC BLOOD PRESSURE: 69 MMHG | RESPIRATION RATE: 18 BRPM | HEART RATE: 72 BPM | OXYGEN SATURATION: 98 % | TEMPERATURE: 97.8 F | SYSTOLIC BLOOD PRESSURE: 143 MMHG

## 2024-01-03 DIAGNOSIS — R51.9 HEADACHE: ICD-10-CM

## 2024-01-03 DIAGNOSIS — R42 LIGHTHEADEDNESS: ICD-10-CM

## 2024-01-03 DIAGNOSIS — Z99.2 ESRD (END STAGE RENAL DISEASE) ON DIALYSIS (HCC): Primary | ICD-10-CM

## 2024-01-03 DIAGNOSIS — N18.6 ESRD (END STAGE RENAL DISEASE) ON DIALYSIS (HCC): Primary | ICD-10-CM

## 2024-01-03 DIAGNOSIS — I10 HYPERTENSION: ICD-10-CM

## 2024-01-03 LAB
2HR DELTA HS TROPONIN: -2 NG/L
ALBUMIN SERPL BCP-MCNC: 3.9 G/DL (ref 3.5–5)
ALP SERPL-CCNC: 74 U/L (ref 34–104)
ALT SERPL W P-5'-P-CCNC: 10 U/L (ref 7–52)
ANION GAP SERPL CALCULATED.3IONS-SCNC: 9 MMOL/L
AST SERPL W P-5'-P-CCNC: 15 U/L (ref 13–39)
BASOPHILS # BLD AUTO: 0.09 THOUSANDS/ÂΜL (ref 0–0.1)
BASOPHILS NFR BLD AUTO: 1 % (ref 0–1)
BILIRUB SERPL-MCNC: 0.57 MG/DL (ref 0.2–1)
BUN SERPL-MCNC: 36 MG/DL (ref 5–25)
CALCIUM SERPL-MCNC: 8.4 MG/DL (ref 8.4–10.2)
CARDIAC TROPONIN I PNL SERPL HS: 23 NG/L
CARDIAC TROPONIN I PNL SERPL HS: 25 NG/L
CHLORIDE SERPL-SCNC: 93 MMOL/L (ref 96–108)
CO2 SERPL-SCNC: 34 MMOL/L (ref 21–32)
CREAT SERPL-MCNC: 9.31 MG/DL (ref 0.6–1.3)
EOSINOPHIL # BLD AUTO: 0.53 THOUSAND/ÂΜL (ref 0–0.61)
EOSINOPHIL NFR BLD AUTO: 7 % (ref 0–6)
ERYTHROCYTE [DISTWIDTH] IN BLOOD BY AUTOMATED COUNT: 14.1 % (ref 11.6–15.1)
FLUAV RNA RESP QL NAA+PROBE: NEGATIVE
FLUBV RNA RESP QL NAA+PROBE: NEGATIVE
GFR SERPL CREATININE-BSD FRML MDRD: 6 ML/MIN/1.73SQ M
GLUCOSE SERPL-MCNC: 131 MG/DL (ref 65–140)
HCT VFR BLD AUTO: 32.1 % (ref 36.5–49.3)
HGB BLD-MCNC: 10.7 G/DL (ref 12–17)
IMM GRANULOCYTES # BLD AUTO: 0.03 THOUSAND/UL (ref 0–0.2)
IMM GRANULOCYTES NFR BLD AUTO: 0 % (ref 0–2)
LYMPHOCYTES # BLD AUTO: 1.21 THOUSANDS/ÂΜL (ref 0.6–4.47)
LYMPHOCYTES NFR BLD AUTO: 17 % (ref 14–44)
MCH RBC QN AUTO: 31.7 PG (ref 26.8–34.3)
MCHC RBC AUTO-ENTMCNC: 33.3 G/DL (ref 31.4–37.4)
MCV RBC AUTO: 95 FL (ref 82–98)
MONOCYTES # BLD AUTO: 0.44 THOUSAND/ÂΜL (ref 0.17–1.22)
MONOCYTES NFR BLD AUTO: 6 % (ref 4–12)
NEUTROPHILS # BLD AUTO: 4.86 THOUSANDS/ÂΜL (ref 1.85–7.62)
NEUTS SEG NFR BLD AUTO: 69 % (ref 43–75)
NRBC BLD AUTO-RTO: 0 /100 WBCS
PLATELET # BLD AUTO: 215 THOUSANDS/UL (ref 149–390)
PMV BLD AUTO: 10.1 FL (ref 8.9–12.7)
POTASSIUM SERPL-SCNC: 4.5 MMOL/L (ref 3.5–5.3)
PROT SERPL-MCNC: 6.2 G/DL (ref 6.4–8.4)
RBC # BLD AUTO: 3.38 MILLION/UL (ref 3.88–5.62)
RSV RNA RESP QL NAA+PROBE: NEGATIVE
SARS-COV-2 RNA RESP QL NAA+PROBE: NEGATIVE
SODIUM SERPL-SCNC: 136 MMOL/L (ref 135–147)
WBC # BLD AUTO: 7.16 THOUSAND/UL (ref 4.31–10.16)

## 2024-01-03 PROCEDURE — 93005 ELECTROCARDIOGRAM TRACING: CPT

## 2024-01-03 PROCEDURE — 36415 COLL VENOUS BLD VENIPUNCTURE: CPT

## 2024-01-03 PROCEDURE — 96361 HYDRATE IV INFUSION ADD-ON: CPT

## 2024-01-03 PROCEDURE — 99284 EMERGENCY DEPT VISIT MOD MDM: CPT

## 2024-01-03 PROCEDURE — 96375 TX/PRO/DX INJ NEW DRUG ADDON: CPT

## 2024-01-03 PROCEDURE — 71045 X-RAY EXAM CHEST 1 VIEW: CPT

## 2024-01-03 PROCEDURE — 96376 TX/PRO/DX INJ SAME DRUG ADON: CPT

## 2024-01-03 PROCEDURE — 80053 COMPREHEN METABOLIC PANEL: CPT | Performed by: EMERGENCY MEDICINE

## 2024-01-03 PROCEDURE — 85025 COMPLETE CBC W/AUTO DIFF WBC: CPT | Performed by: EMERGENCY MEDICINE

## 2024-01-03 PROCEDURE — 96365 THER/PROPH/DIAG IV INF INIT: CPT

## 2024-01-03 PROCEDURE — 84484 ASSAY OF TROPONIN QUANT: CPT | Performed by: EMERGENCY MEDICINE

## 2024-01-03 PROCEDURE — 99285 EMERGENCY DEPT VISIT HI MDM: CPT | Performed by: EMERGENCY MEDICINE

## 2024-01-03 PROCEDURE — 0241U HB NFCT DS VIR RESP RNA 4 TRGT: CPT

## 2024-01-03 RX ORDER — ACETAMINOPHEN 325 MG/1
650 TABLET ORAL ONCE
Status: COMPLETED | OUTPATIENT
Start: 2024-01-03 | End: 2024-01-03

## 2024-01-03 RX ORDER — HYDRALAZINE HYDROCHLORIDE 20 MG/ML
10 INJECTION INTRAMUSCULAR; INTRAVENOUS ONCE
Status: COMPLETED | OUTPATIENT
Start: 2024-01-03 | End: 2024-01-03

## 2024-01-03 RX ORDER — LABETALOL HYDROCHLORIDE 5 MG/ML
10 INJECTION, SOLUTION INTRAVENOUS
Status: COMPLETED | OUTPATIENT
Start: 2024-01-03 | End: 2024-01-03

## 2024-01-03 RX ORDER — KETOROLAC TROMETHAMINE 30 MG/ML
15 INJECTION, SOLUTION INTRAMUSCULAR; INTRAVENOUS ONCE
Status: COMPLETED | OUTPATIENT
Start: 2024-01-03 | End: 2024-01-03

## 2024-01-03 RX ORDER — LABETALOL 100 MG/1
200 TABLET, FILM COATED ORAL ONCE
Status: COMPLETED | OUTPATIENT
Start: 2024-01-03 | End: 2024-01-03

## 2024-01-03 RX ADMIN — SODIUM CHLORIDE 500 ML: 0.9 INJECTION, SOLUTION INTRAVENOUS at 11:26

## 2024-01-03 RX ADMIN — ACETAMINOPHEN 650 MG: 325 TABLET, FILM COATED ORAL at 11:24

## 2024-01-03 RX ADMIN — KETOROLAC TROMETHAMINE 15 MG: 30 INJECTION, SOLUTION INTRAMUSCULAR; INTRAVENOUS at 11:25

## 2024-01-03 RX ADMIN — HYDRALAZINE HYDROCHLORIDE 10 MG: 20 INJECTION, SOLUTION INTRAMUSCULAR; INTRAVENOUS at 15:53

## 2024-01-03 RX ADMIN — LABETALOL HYDROCHLORIDE 10 MG: 5 INJECTION, SOLUTION INTRAVENOUS at 15:29

## 2024-01-03 RX ADMIN — LABETALOL HYDROCHLORIDE 10 MG: 5 INJECTION, SOLUTION INTRAVENOUS at 14:29

## 2024-01-03 RX ADMIN — NICARDIPINE HYDROCHLORIDE 5 MG/HR: 2.5 INJECTION, SOLUTION INTRAVENOUS at 16:41

## 2024-01-03 RX ADMIN — LABETALOL HYDROCHLORIDE 200 MG: 100 TABLET, FILM COATED ORAL at 12:34

## 2024-01-03 NOTE — HEMODIALYSIS
Pt came to ED from outpatient dialysis clinic. Removed hemodialysis fistula needles-to be discharged home.

## 2024-01-03 NOTE — ED PROVIDER NOTES
"History  Chief Complaint   Patient presents with    Syncope     PT presents from [facility] dialysis, when \"group home through treatment patient became nauseated and felt like he was going to pass out\" per EMS PT also c/o headache since Sunday     Patient is a 38-year-old male with history of T1 IDDM, hypertension, Anuric ESRD on HD Monday, Wednesday, Friday, DL presenting to the emergency department for evaluation of headache and pre-syncope. Patient reports he is normally on dialysis Monday, Wednesday, Friday.  He did receive dialysis on Sunday instead of Monday secondary to the holiday.  He reports he may have had slightly more fluid removed and lost a small amount of weight.  Shortly thereafter he developed headache with intermittent nausea.  Patient reports that when he received his second treatment, today, he felt very lightheaded/presyncopal with some associated nausea. He was subsequently referred to the ED for further evaluation. Apart from recent HA/N/V patient otherwise in his normal state of health. Patient reports a history of difficult to treat BP resulting in seizure warranting admission in the past.        Prior to Admission Medications   Prescriptions Last Dose Informant Patient Reported? Taking?   B-D ULTRAFINE III SHORT PEN 31G X 8 MM MISC  Self No No   Sig: USE 1 PEN NEEDLE 8 TIMES DAILY   GLUCAGON EMERGENCY 1 MG injection  Self Yes No   Gvoke HypoPen 1-Pack 1 MG/0.2ML SOAJ  Self Yes No   Insulin Disposable Pump (Omnipod 5 G6 Intro, Gen 5,) KIT  Self Yes No   Insulin Disposable Pump (Omnipod 5 G6 Pod, Gen 5,) MISC  Self Yes No   NIFEdipine (PROCARDIA XL) 90 mg 24 hr tablet  Self No No   Sig: Take 1 tablet (90 mg total) by mouth daily   NovoLOG 100 UNIT/ML injection  Self Yes No   Patiromer Sorbitex Calcium (VELTASSA PO)  Self Yes No   Sig: Take 5 g by mouth once a week   SPS 15 GM/60ML suspension  Self Yes No   Sig: TAKE 60 ML BY MOUTH SINGLE DOSE FOR EMERGENCY USE DURING MISSED HEMODIALYSIS   Velphoro 500 " MG CHEW   Yes No   Sig: CRUSH OR CHEW AND SWALLOW 2 TABLETS 3 TIMES A DAY WITH MEALS   aspirin (ECOTRIN LOW STRENGTH) 81 mg EC tablet  Self Yes No   Sig: Take 81 mg by mouth daily   atorvastatin (LIPITOR) 40 mg tablet   No No   Sig: Take 1 tablet (40 mg total) by mouth every evening   b complex vitamins capsule  Self Yes No   Sig: Take 1 capsule by mouth daily before lunch    calcium acetate (PHOSLO) capsule  Self Yes No   Sig: TAKE 3 CAPSULES BY MOUTH WITH MEALS   cinacalcet (SENSIPAR) 60 MG tablet  Self Yes No   Sig: Take 120 mg by mouth daily 2 tab daily    cloNIDine (CATAPRES-TTS-3) 0.3 mg/24 hr  Self Yes No   Si patch once a week   escitalopram (LEXAPRO) 20 mg tablet  Self No No   Sig: Take 1 tablet (20 mg total) by mouth daily   famotidine (PEPCID) 40 MG tablet   No No   Sig: TAKE 1 TABLET BY MOUTH IN THE MORNING   gabapentin (NEURONTIN) 100 mg capsule   No No   Sig: TAKE 2 CAPSULES BY MOUTH AT BEDTIME   hydrALAZINE (APRESOLINE) 25 mg tablet  Self No No   Sig: Take 1 tablet (25 mg total) by mouth 3 (three) times a day   hydrOXYzine HCL (ATARAX) 10 mg tablet  Self No No   Sig: Take 1 tablet (10 mg total) by mouth every 6 (six) hours as needed for itching or anxiety   labetalol (NORMODYNE) 200 mg tablet 1/3/2024 Self No Yes   Sig: TAKE 2 TABLETS BY MOUTH THREE TIMES DAILY   lisinopril (ZESTRIL) 40 mg tablet  Self Yes No   Sig: Take 80 mg by mouth daily   mupirocin (BACTROBAN) 2 % ointment  Self No No   Sig: Apply topically 3 (three) times a day Until lesion on right hand heals.   ondansetron (ZOFRAN) 4 mg tablet  Self No No   Sig: Take 1 tablet (4 mg total) by mouth every 8 (eight) hours as needed for nausea or vomiting   saccharomyces boulardii (FLORASTOR) 250 mg capsule  Self Yes No   Sig: Take 250 mg by mouth daily   traZODone (DESYREL) 50 mg tablet  Self No No   Sig: Take 0.5 tablets (25 mg total) by mouth daily at bedtime as needed for sleep   triamcinolone (KENALOG) 0.1 % ointment  Self No No   Sig:  Apply topically 2 (two) times a day For up to 14 days on the itchy areas. Avoid groin, underarms and face. It is important to take at least 1 week break between uses.      Facility-Administered Medications: None       Past Medical History:   Diagnosis Date    Acute kidney injury (HCC)     Ambulates with cane     Anuria     Anxiety     Cellulitis of right elbow 03/31/2021    Chronic kidney disease     Depression     Diabetes mellitus (HCC)     Diarrhea     Emesis 10/24/2020    End stage renal disease (HCC) 02/11/2018    Formatting of this note might be different from the original. Last Assessment & Plan:  Secondary to DM.  On nightly PD.  Followed by [facility].  Patient considering transplant for kidney and pancreas through [facility] Formatting of this note might be different from the original. Last Assessment & Plan:  Formatting of this note might be different from the original. 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    Esophagitis 07/21/2015    Falls     Gastroparesis     GERD (gastroesophageal reflux disease)     History of shingles 2010    History of transfusion 02/2018    no adverse reaction    Hyperlipidemia     Hyperphosphatemia     Hypertension     Hypoglycemia 07/15/2022    Itching     Mastoiditis of right side 07/15/2022    Muscle weakness     general unsteadiness    Obesity (BMI 30.0-34.9) 09/09/2019    Orthostatic hypotension 10/25/2020    Peripheral polyneuropathy 11/20/2019    PONV (postoperative nausea and vomiting) 01/26/2018    Protein-calorie malnutrition (HCC) 11/23/2020    Recurrent peritonitis (HCC) due to peritoneal dialysis catheter 07/31/2020    Retinopathy     Seizures ([facility])     early 2020 - one time    Skin abnormality     some dime size areas where skin was scratched from itching    Spontaneous bacterial peritonitis (HCC) 10/19/2020    Squamous cell skin cancer     left temple    Stroke (HCC)     x2 - off balance/no driving/fatigue    Swelling of both  lower extremities     Traumatic onycholysis 07/21/2022    Vomiting     Wears glasses        Past Surgical History:   Procedure Laterality Date    CARDIAC ELECTROPHYSIOLOGY PROCEDURE N/A 9/21/2023    Procedure: Cardiac loop recorder explant;  Surgeon: Parish Morgan MD;  Location: [facility];  Service: Cardiology    CARDIAC LOOP RECORDER  05/2018    COLONOSCOPY      EGD      EYE SURGERY Right     IR AV FISTULAGRAM/GRAFTOGRAM  02/23/2021    IR TUNNELED CENTRAL LINE PLACEMENT  02/16/2021    IR TUNNELED DIALYSIS CATHETER PLACEMENT  11/18/2020    IR TUNNELED DIALYSIS CATHETER REMOVAL  02/12/2021    IR TUNNELED DIALYSIS CATHETER REMOVAL  03/11/2021    MOHS SURGERY Left 12/14/2022    Left temple with Dr. Hassan    PERITONEAL CATHETER INSERTION N/A 08/27/2018    Procedure: UNROOF PD CATHETER;  Surgeon: Felipe Lindo DO;  Location: [facility];  Service: General    OK ARTERIOVENOUS ANASTOMOSIS OPEN DIRECT Left 11/09/2020    Procedure: CREATION FISTULA  ARTERIOVENOUS (AV) - LEFT WRIST;  Surgeon: Placido Altamirano MD;  Location: [facility];  Service: Vascular    OK ESOPHAGOGASTRODUODENOSCOPY TRANSORAL DIAGNOSTIC N/A 04/18/2019    Procedure: ESOPHAGOGASTRODUODENOSCOPY (EGD);  Surgeon: Ale Figueroa MD;  Location: [facility];  Service: Gastroenterology    OK LAPS INSERTION TUNNELED INTRAPERITONEAL CATHETER N/A 08/06/2018    Procedure: LAPAROSCOPIC PD CATHETER PLACEMENT;  Surgeon: Felipe Lindo DO;  Location: [facility];  Service: General    OK REMOVAL TUNNELED INTRAPERITONEAL CATHETER N/A 11/18/2020    Procedure: REMOVAL CATHETER PERITONEAL DIALYSIS;  Surgeon: Abdifatah Ty MD;  Location: [facility];  Service: General    TONSILLECTOMY      UPPER GASTROINTESTINAL ENDOSCOPY         Family History   Problem Relation Age of Onset    Breast cancer Mother     Hypertension Mother     Hyperlipidemia Father     Hypertension Father     Leukemia Maternal Grandmother     Hyperlipidemia Maternal Grandfather     Hypertension Maternal  Grandfather     Hyperlipidemia Paternal Grandmother     Hypertension Paternal Grandmother     Heart disease Paternal Grandfather         cardiac disorder    Diabetes Paternal Grandfather      I have reviewed and agree with the history as documented.    E-Cigarette/Vaping    E-Cigarette Use Current Every Day User     Comments medical marijuana      E-Cigarette/Vaping Substances    Nicotine No     THC Yes     CBD Yes     Flavoring No     Other No     Unknown No      Social History     Tobacco Use    Smoking status: Former     Current packs/day: 0.00     Average packs/day: 0.5 packs/day for 12.0 years (6.0 ttl pk-yrs)     Types: Cigarettes     Start date: 2006     Quit date: 2018     Years since quittin.9    Smokeless tobacco: Never    Tobacco comments:     quit 2018   Vaping Use    Vaping status: Every Day    Substances: THC, CBD   Substance Use Topics    Alcohol use: Not Currently    Drug use: Yes     Types: Marijuana     Comment: medical marijuana        Review of Systems   Constitutional:  Negative for chills and fever.   HENT:  Negative for ear pain and sore throat.    Eyes:  Negative for pain and visual disturbance.   Respiratory:  Negative for cough and shortness of breath.    Cardiovascular:  Negative for chest pain and palpitations.   Gastrointestinal:  Negative for abdominal pain and vomiting.   Genitourinary:  Negative for dysuria and hematuria.   Musculoskeletal:  Negative for arthralgias and back pain.   Skin:  Negative for color change and rash.   Neurological:  Positive for headaches. Negative for seizures and syncope.   All other systems reviewed and are negative.      Physical Exam  ED Triage Vitals [24 0912]   Temperature Pulse Respirations Blood Pressure SpO2   97.8 °F (36.6 °C) 62 18 (!) 215/98 96 %      Temp Source Heart Rate Source Patient Position - Orthostatic VS BP Location FiO2 (%)   Oral Monitor Lying Left arm --      Pain Score       --             Orthostatic Vital  Signs  Vitals:    01/03/24 1641 01/03/24 1645 01/03/24 1650 01/03/24 1700   BP: (!) 208/103 (!) 200/95 168/81 143/69   Pulse: 68 67 67 72   Patient Position - Orthostatic VS:  Lying Lying Lying       Physical Exam  Vitals and nursing note reviewed.   Constitutional:       General: He is not in acute distress.     Appearance: Normal appearance. He is not ill-appearing, toxic-appearing or diaphoretic.   HENT:      Head: Normocephalic and atraumatic.   Eyes:      General: No scleral icterus.        Right eye: No discharge.         Left eye: No discharge.      Extraocular Movements: Extraocular movements intact.      Conjunctiva/sclera: Conjunctivae normal.   Cardiovascular:      Rate and Rhythm: Normal rate.      Pulses: Normal pulses.      Heart sounds: Normal heart sounds. No murmur heard.     No friction rub. No gallop.   Pulmonary:      Effort: Pulmonary effort is normal. No respiratory distress.      Breath sounds: Normal breath sounds. No stridor. No wheezing, rhonchi or rales.   Abdominal:      General: Abdomen is flat. Bowel sounds are normal. There is no distension.      Palpations: Abdomen is soft.      Tenderness: There is no abdominal tenderness. There is no guarding or rebound.   Musculoskeletal:         General: No swelling. Normal range of motion.      Cervical back: Normal range of motion. No rigidity.      Right lower leg: No edema.      Left lower leg: No edema.      Comments: L arm dialysis lines in place   Skin:     General: Skin is warm and dry.      Capillary Refill: Capillary refill takes less than 2 seconds.      Coloration: Skin is not jaundiced.      Findings: No bruising or lesion.   Neurological:      General: No focal deficit present.      Mental Status: He is alert and oriented to person, place, and time. Mental status is at baseline.   Psychiatric:         Mood and Affect: Mood normal.         Behavior: Behavior normal.         Thought Content: Thought content normal.         Judgment:  Judgment normal.         ED Medications  Medications   niCARdipine (CARDENE) 25 mg (STANDARD CONCENTRATION) in sodium chloride 0.9% 250 mL (0 mg/hr Intravenous Stopped 1/3/24 1702)   sodium chloride 0.9 % bolus 500 mL (0 mL Intravenous Stopped 1/3/24 1230)   ketorolac (TORADOL) injection 15 mg (15 mg Intravenous Given 1/3/24 1125)   acetaminophen (TYLENOL) tablet 650 mg (650 mg Oral Given 1/3/24 1124)   labetalol (NORMODYNE) tablet 200 mg (200 mg Oral Given 1/3/24 1234)   labetalol (NORMODYNE) injection 10 mg (10 mg Intravenous Given 1/3/24 1529)   hydrALAZINE (APRESOLINE) injection 10 mg (10 mg Intravenous Given 1/3/24 1553)       Diagnostic Studies  Results Reviewed       Procedure Component Value Units Date/Time    FLU/RSV/COVID - if FLU/RSV clinically relevant [[id number]]  (Normal) Collected: 01/03/24 1132    Lab Status: Final result Specimen: Nares from Nose Updated: 01/03/24 1224     SARS-CoV-2 Negative     INFLUENZA A PCR Negative     INFLUENZA B PCR Negative     RSV PCR Negative    Narrative:      FOR PEDIATRIC PATIENTS - copy/paste COVID Guidelines URL to browser: [email or web address]    SARS-CoV-2 assay is a Nucleic Acid Amplification assay intended for the  qualitative detection of nucleic acid from SARS-CoV-2 in nasopharyngeal  swabs. Results are for the presumptive identification of SARS-CoV-2 RNA.    Positive results are indicative of infection with SARS-CoV-2, the virus  causing COVID-19, but do not rule out bacterial infection or co-infection  with other viruses. Laboratories within the [place] and its  territories are required to report all positive results to the appropriate  public health authorities. Negative results do not preclude SARS-CoV-2  infection and should not be used as the sole basis for treatment or other  patient management decisions. Negative results must be combined with  clinical observations, patient history, and  epidemiological information.  This test has not been FDA cleared or approved.    This test has been authorized by FDA under an Emergency Use Authorization  (EUA). This test is only authorized for the duration of time the  declaration that circumstances exist justifying the authorization of the  emergency use of an in vitro diagnostic tests for detection of SARS-CoV-2  virus and/or diagnosis of COVID-19 infection under section 564(b)(1) of  the Act, 21 U.S.C. 360bbb-3(b)(1), unless the authorization is terminated  or revoked sooner. The test has been validated but independent review by FDA  and CLIA is pending.    Test performed using [employer] GeneXpert: This RT-PCR assay targets N2,  a region unique to SARS-CoV-2. A conserved region in the E-gene was chosen  for pan-Sarbecovirus detection which includes SARS-CoV-2.    According to CMS-2020-01-R, this platform meets the definition of high-throughput technology.    HS Troponin I 2hr [[id number]]  (Normal) Collected: 01/03/24 1141    Lab Status: Final result Specimen: Blood from Arm, Right Updated: 01/03/24 1221     hs TnI 2hr 23 ng/L      Delta 2hr hsTnI -2 ng/L     HS Troponin 0hr (reflex protocol) [[id number]]  (Normal) Collected: 01/03/24 0934    Lab Status: Final result Specimen: Blood from Arm, Right Updated: 01/03/24 1014     hs TnI 0hr 25 ng/L     Comprehensive metabolic panel [[id number]]  (Abnormal) Collected: 01/03/24 0934    Lab Status: Final result Specimen: Blood from Arm, Right Updated: 01/03/24 1005     Sodium 136 mmol/L      Potassium 4.5 mmol/L      Chloride 93 mmol/L      CO2 34 mmol/L      ANION GAP 9 mmol/L      BUN 36 mg/dL      Creatinine 9.31 mg/dL      Glucose 131 mg/dL      Calcium 8.4 mg/dL      AST 15 U/L      ALT 10 U/L      Alkaline Phosphatase 74 U/L      Total Protein 6.2 g/dL      Albumin 3.9 g/dL      Total Bilirubin 0.57 mg/dL      eGFR 6 ml/min/1.73sq m     Narrative:      National Kidney Disease Foundation guidelines for Chronic Kidney  Disease (CKD):     Stage 1 with normal or high GFR (GFR > 90 mL/min/1.73 square meters)    Stage 2 Mild CKD (GFR = 60-89 mL/min/1.73 square meters)    Stage 3A Moderate CKD (GFR = 45-59 mL/min/1.73 square meters)    Stage 3B Moderate CKD (GFR = 30-44 mL/min/1.73 square meters)    Stage 4 Severe CKD (GFR = 15-29 mL/min/1.73 square meters)    Stage 5 End Stage CKD (GFR <15 mL/min/1.73 square meters)  Note: GFR calculation is accurate only with a steady state creatinine    CBC and differential [[id number]]  (Abnormal) Collected: 01/03/24 0934    Lab Status: Final result Specimen: Blood from Arm, Right Updated: 01/03/24 0947     WBC 7.16 Thousand/uL      RBC 3.38 Million/uL      Hemoglobin 10.7 g/dL      Hematocrit 32.1 %      MCV 95 fL      MCH 31.7 pg      MCHC 33.3 g/dL      RDW 14.1 %      MPV 10.1 fL      Platelets 215 Thousands/uL      nRBC 0 /100 WBCs      Neutrophils Relative 69 %      Immat GRANS % 0 %      Lymphocytes Relative 17 %      Monocytes Relative 6 %      Eosinophils Relative 7 %      Basophils Relative 1 %      Neutrophils Absolute 4.86 Thousands/µL      Immature Grans Absolute 0.03 Thousand/uL      Lymphocytes Absolute 1.21 Thousands/µL      Monocytes Absolute 0.44 Thousand/µL      Eosinophils Absolute 0.53 Thousand/µL      Basophils Absolute 0.09 Thousands/µL                    XR chest 1 view portable   ED Interpretation by Andrey Lawton DO (01/03 1123)   No infiltrate, effusion, pneumothorax            Procedures  ECG 12 Lead Documentation Only    Date/Time: 1/3/2024 2:23 PM    Performed by: Andrey Lawton DO  Authorized by: Andrey Lawton DO    Indications / Diagnosis:  HA  ECG reviewed by me, the ED Provider: yes    Patient location:  ED  Previous ECG:     Previous ECG:  Compared to current    Similarity:  No change  Interpretation:     Interpretation: normal    Rate:     ECG rate:  67    ECG rate assessment: normal    Rhythm:     Rhythm: sinus rhythm     Ectopy:     Ectopy: none    QRS:     QRS axis:  Normal  Conduction:     Conduction: abnormal    ST segments:     ST segments:  Normal  T waves:     T waves: normal    Comments:      QRS prolongation        ED Course  ED Course as of 01/03/24 1845   Wed Jan 03, 2024   1009 CBC and differential(!)  Baseline anemia likely 2/2 ESRD on HD   1009 Comprehensive metabolic panel(!)  ESRD   1020 hs TnI 0hr: 25   1027 Blood Pressure(!): 215/98   1219 Patient given 200 mg labetalol per at-home regimen   1219 Blood Pressure(!): 212/103   1246 hs TnI 2hr: 23   1247 FLU/RSV/COVID - if FLU/RSV clinically relevant                                       Medical Decision Making  2-year-old male presenting to the emergency department for evaluation of headache, presyncope during dialysis.  Overall patient appears minimally uncomfortable with headache.  He does have his dialysis lines in place in his left upper extremity.  Otherwise examines well.  Laboratory evaluation largely consistent with baseline apart from mildly decreased chloride.  Patient was seen and evaluated by nephrology in the emergency department who agrees that if patient feels better he is safe for discharge.  His EKG is without acute ischemic changes, he does have a mildly increased QTc at 498 which appears consistent with prior. Overall patient reports marked HA improvement.  Overall evaluation for potential presyncope is unremarkable.  His troponin is normal, doubt arrhythmia, electrolyte disturbance, blood pressure improved with administration of p.o. and IV labetalol, hydralazine, and nicardipine.  Will recommend returning to normal dialysis schedule.  Patient discharged.  At time of discharge patient appeared well, nontoxic.  Overall evaluation reassuring.    Amount and/or Complexity of Data Reviewed  Labs: ordered. Decision-making details documented in ED Course.  Radiology: ordered and independent interpretation performed.    Risk  OTC drugs.  Prescription  drug management.          Disposition  Final diagnoses:   ESRD (end stage renal disease) on dialysis (HCC)   Hypertension   Lightheadedness   Headache     Time reflects when diagnosis was documented in both MDM as applicable and the Disposition within this note       Time User Action Codes Description Comment    1/3/2024  1:13 PM Lawton Andrey Add [N18.6,  Z99.2] ESRD (end stage renal disease) on dialysis (HCC)     1/3/2024  1:13 PM Jered Lyndonlilli Add [I10] Hypertension     1/3/2024  1:13 PM Jered Andrey Add [R42] Lightheadedness     1/3/2024  1:13 PM Jered Andrey Add [R51.9] Headache           ED Disposition       ED Disposition   Discharge    Condition   Stable    Date/Time   Wed Eliezer 3, 2024  1:12 PM    Comment   Mateus Mckeon discharge to home/self care.                   Follow-up Information       Follow up With Specialties Details Why Contact Info    Dania Sher, DO Internal Medicine   [address] [facility]  [address], [address]  [place] [address] [phone]              Discharge Medication List as of 1/3/2024  5:09 PM        CONTINUE these medications which have NOT CHANGED    Details   labetalol (NORMODYNE) 200 mg tablet TAKE 2 TABLETS BY MOUTH THREE TIMES DAILY, Normal      aspirin (ECOTRIN LOW STRENGTH) 81 mg EC tablet Take 81 mg by mouth daily, Historical Med      atorvastatin (LIPITOR) 40 mg tablet Take 1 tablet (40 mg total) by mouth every evening, Starting u 10/5/2023, Normal      b complex vitamins capsule Take 1 capsule by mouth daily before lunch , Historical Med      B-D ULTRAFINE III SHORT PEN 31G X 8 MM MISC USE 1 PEN NEEDLE 8 TIMES DAILY, Normal      calcium acetate (PHOSLO) capsule TAKE 3 CAPSULES BY MOUTH WITH MEALS, Historical Med      cinacalcet (SENSIPAR) 60 MG tablet Take 120 mg by mouth daily 2 tab daily , Historical Med      cloNIDine (CATAPRES-TTS-3) 0.3 mg/24 hr 1 patch once a week, Historical Med      escitalopram (LEXAPRO) 20 mg tablet Take 1 tablet  (20 mg total) by mouth daily, Starting Wed 3/15/2023, Normal      famotidine (PEPCID) 40 MG tablet TAKE 1 TABLET BY MOUTH IN THE MORNING, Starting Fri 12/15/2023, Normal      gabapentin (NEURONTIN) 100 mg capsule TAKE 2 CAPSULES BY MOUTH AT BEDTIME, Normal      GLUCAGON EMERGENCY 1 MG injection Historical Med      Gvoke HypoPen 1-Pack 1 MG/0.2ML SOAJ Historical Med      hydrALAZINE (APRESOLINE) 25 mg tablet Take 1 tablet (25 mg total) by mouth 3 (three) times a day, Starting Fri 1/27/2023, Normal      hydrOXYzine HCL (ATARAX) 10 mg tablet Take 1 tablet (10 mg total) by mouth every 6 (six) hours as needed for itching or anxiety, Starting Wed 12/7/2022, Normal      Insulin Disposable Pump (Omnipod 5 G6 Intro, Gen 5,) KIT Starting Tue 1/17/2023, Historical Med      Insulin Disposable Pump (Omnipod 5 G6 Pod, Gen 5,) MISC Starting Fri 3/24/2023, Historical Med      lisinopril (ZESTRIL) 40 mg tablet Take 80 mg by mouth daily, Starting Wed 2/9/2022, Historical Med      mupirocin (BACTROBAN) 2 % ointment Apply topically 3 (three) times a day Until lesion on right hand heals., Starting Fri 10/14/2022, Normal      NIFEdipine (PROCARDIA XL) 90 mg 24 hr tablet Take 1 tablet (90 mg total) by mouth daily, Starting Sun 1/15/2023, Normal      NovoLOG 100 UNIT/ML injection Historical Med      ondansetron (ZOFRAN) 4 mg tablet Take 1 tablet (4 mg total) by mouth every 8 (eight) hours as needed for nausea or vomiting, Starting Wed 1/18/2023, Normal      Patiromer Sorbitex Calcium (VELTASSA PO) Take 5 g by mouth once a week, Historical Med      saccharomyces boulardii (FLORASTOR) 250 mg capsule Take 250 mg by mouth daily, Historical Med      SPS 15 GM/60ML suspension TAKE 60 ML BY MOUTH SINGLE DOSE FOR EMERGENCY USE DURING MISSED HEMODIALYSIS, Historical Med      traZODone (DESYREL) 50 mg tablet Take 0.5 tablets (25 mg total) by mouth daily at bedtime as needed for sleep, Starting Wed 12/7/2022, Normal      triamcinolone (KENALOG) 0.1 %  ointment Apply topically 2 (two) times a day For up to 14 days on the itchy areas. Avoid groin, underarms and face. It is important to take at least 1 week break between uses., Starting Fri 10/14/2022, Normal      Velphoro 500 MG CHEW CRUSH OR CHEW AND SWALLOW 2 TABLETS 3 TIMES A DAY WITH MEALS, Historical Med           No discharge procedures on file.    PDMP Review         Value Time User    PDMP Reviewed  Yes 7/15/2022  4:20 AM Guanaco Collins MD             ED Provider  Attending physically available and evaluated Mateus Mckeon. I managed the patient along with the ED Attending.    Electronically Signed by           Andrey Lawton,   01/03/24 1438       Andrey Lawton,   01/03/24 1844       Andrey Lawton,   01/03/24 1841

## 2024-01-03 NOTE — ED ATTENDING ATTESTATION
"1/3/2024  I, Sudarshan Mckinney MD, saw and evaluated the patient. I have discussed the patient with the resident/non-physician practitioner and agree with the resident's/non-physician practitioner's findings, Plan of Care, and MDM as documented in the resident's/non-physician practitioner's note, except where noted. All available labs and Radiology studies were reviewed.  I was present for key portions of any procedure(s) performed by the resident/non-physician practitioner and I was immediately available to provide assistance.       At this point I agree with the current assessment done in the Emergency Department.  I have conducted an independent evaluation of this patient a history and physical is as follows:    S:  Chief Complaint   Patient presents with    Syncope     PT presents from [facility] dialysis, when \"[facility] through treatment patient became nauseated and felt like he was going to pass out\" per EMS PT also c/o headache since Sunday     Bill is a 38 y.o. male who presents with the chief complaint of near syncope during his dialysis treatment this morning.  He reports he had an early treatment on Sunday (normally a M/W/F patient) due to the holiday.  His weight this morning was lower than dry weight.  He reports a headache.  No chest pain, palpitations, shortness of breath, diaphoresis, nausea or vomiting.    O:  ED Triage Vitals [01/03/24 0912]   Temperature Pulse Respirations Blood Pressure SpO2   97.8 °F (36.6 °C) 62 18 (!) 215/98 96 %      Temp Source Heart Rate Source Patient Position - Orthostatic VS BP Location FiO2 (%)   Oral Monitor Lying Left arm --      Pain Score       --         Physical Exam  Vitals and nursing note reviewed.   Constitutional:       General: He is in acute distress (mild).      Appearance: He is well-developed.   HENT:      Head: Normocephalic and atraumatic.   Eyes:      Pupils: Pupils are equal, round, and reactive to light.   Neck:      Vascular: No JVD.   Cardiovascular: "      Rate and Rhythm: Normal rate and regular rhythm.      Heart sounds: Normal heart sounds. No murmur heard.     No friction rub. No gallop.   Pulmonary:      Effort: Pulmonary effort is normal. No respiratory distress.      Breath sounds: Normal breath sounds. No wheezing or rales.   Chest:      Chest wall: No tenderness.   Musculoskeletal:         General: No tenderness. Normal range of motion.      Cervical back: Normal range of motion.      Comments: Dialysis catheter and needles still in av fistula left forearm   Skin:     General: Skin is warm and dry.   Neurological:      General: No focal deficit present.      Mental Status: He is alert and oriented to person, place, and time.   Psychiatric:         Behavior: Behavior normal.         Thought Content: Thought content normal.         Judgment: Judgment normal.       A/P:  Background: 38 y.o. male presents with chief complaint of lightheadedness and dizziness.    Differential Diagnosis: hypovolemia/dehydration, arrhythmia, atypical acs, anemia, metabolic derangement, concussion, vertigo,    Plan: cardiac workup, symptomatic treatment, possible admission    Physician Extender from nephrology was down to see patient.  She believes that barring any new abnormalities identified on workup that the patient will be appropriate for discharge post IVF and treatment for headache.    ED Course     Labs Reviewed   CBC AND DIFFERENTIAL - Abnormal       Result Value Ref Range Status    WBC 7.16  4.31 - 10.16 Thousand/uL Final    RBC 3.38 (*) 3.88 - 5.62 Million/uL Final    Hemoglobin 10.7 (*) 12.0 - 17.0 g/dL Final    Hematocrit 32.1 (*) 36.5 - 49.3 % Final    MCV 95  82 - 98 fL Final    MCH 31.7  26.8 - 34.3 pg Final    MCHC 33.3  31.4 - 37.4 g/dL Final    RDW 14.1  11.6 - 15.1 % Final    MPV 10.1  8.9 - 12.7 fL Final    Platelets 215  149 - 390 Thousands/uL Final    nRBC 0  /100 WBCs Final    Neutrophils Relative 69  43 - 75 % Final    Immat GRANS % 0  0 - 2 % Final     Lymphocytes Relative 17  14 - 44 % Final    Monocytes Relative 6  4 - 12 % Final    Eosinophils Relative 7 (*) 0 - 6 % Final    Basophils Relative 1  0 - 1 % Final    Neutrophils Absolute 4.86  1.85 - 7.62 Thousands/µL Final    Immature Grans Absolute 0.03  0.00 - 0.20 Thousand/uL Final    Lymphocytes Absolute 1.21  0.60 - 4.47 Thousands/µL Final    Monocytes Absolute 0.44  0.17 - 1.22 Thousand/µL Final    Eosinophils Absolute 0.53  0.00 - 0.61 Thousand/µL Final    Basophils Absolute 0.09  0.00 - 0.10 Thousands/µL Final   COMPREHENSIVE METABOLIC PANEL - Abnormal    Sodium 136  135 - 147 mmol/L Final    Potassium 4.5  3.5 - 5.3 mmol/L Final    Chloride 93 (*) 96 - 108 mmol/L Final    CO2 34 (*) 21 - 32 mmol/L Final    ANION GAP 9  mmol/L Final    BUN 36 (*) 5 - 25 mg/dL Final    Creatinine 9.31 (*) 0.60 - 1.30 mg/dL Final    Comment: Standardized to IDMS reference method    Glucose 131  65 - 140 mg/dL Final    Comment: If the patient is fasting, the ADA then defines impaired fasting glucose as > 100 mg/dL and diabetes as > or equal to 123 mg/dL.    Calcium 8.4  8.4 - 10.2 mg/dL Final    AST 15  13 - 39 U/L Final    ALT 10  7 - 52 U/L Final    Comment: Specimen collection should occur prior to Sulfasalazine administration due to the potential for falsely depressed results.     Alkaline Phosphatase 74  34 - 104 U/L Final    Total Protein 6.2 (*) 6.4 - 8.4 g/dL Final    Albumin 3.9  3.5 - 5.0 g/dL Final    Total Bilirubin 0.57  0.20 - 1.00 mg/dL Final    Comment: Use of this assay is not recommended for patients undergoing treatment with eltrombopag due to the potential for falsely elevated results.  N-acetyl-p-benzoquinone imine (metabolite of Acetaminophen) will generate erroneously low results in samples for patients that have taken an overdose of Acetaminophen.    eGFR 6  ml/min/1.73sq m Final    Narrative:     National Kidney Disease Foundation guidelines for Chronic Kidney Disease (CKD):     Stage 1 with normal or  high GFR (GFR > 90 mL/min/1.73 square meters)    Stage 2 Mild CKD (GFR = 60-89 mL/min/1.73 square meters)    Stage 3A Moderate CKD (GFR = 45-59 mL/min/1.73 square meters)    Stage 3B Moderate CKD (GFR = 30-44 mL/min/1.73 square meters)    Stage 4 Severe CKD (GFR = 15-29 mL/min/1.73 square meters)    Stage 5 End Stage CKD (GFR <15 mL/min/1.73 square meters)  Note: GFR calculation is accurate only with a steady state creatinine   COVID19, INFLUENZA A/B, RSV PCR, [facility] - Normal    SARS-CoV-2 Negative  Negative Final    Comment:      INFLUENZA A PCR Negative  Negative Final    Comment:      INFLUENZA B PCR Negative  Negative Final    Comment:      RSV PCR Negative  Negative Final    Comment:      Narrative:     FOR PEDIATRIC PATIENTS - copy/paste COVID Guidelines URL to browser: [email or web address]    SARS-CoV-2 assay is a Nucleic Acid Amplification assay intended for the  qualitative detection of nucleic acid from SARS-CoV-2 in nasopharyngeal  swabs. Results are for the presumptive identification of SARS-CoV-2 RNA.    Positive results are indicative of infection with SARS-CoV-2, the virus  causing COVID-19, but do not rule out bacterial infection or co-infection  with other viruses. Laboratories within the [place] and its  territories are required to report all positive results to the appropriate  public health authorities. Negative results do not preclude SARS-CoV-2  infection and should not be used as the sole basis for treatment or other  patient management decisions. Negative results must be combined with  clinical observations, patient history, and epidemiological information.  This test has not been FDA cleared or approved.    This test has been authorized by FDA under an Emergency Use Authorization  (EUA). This test is only authorized for the duration of time the  declaration that circumstances exist justifying the authorization of the  emergency use  "of an in vitro diagnostic tests for detection of SARS-CoV-2  virus and/or diagnosis of COVID-19 infection under section 564(b)(1) of  the Act, 21 U.S.C. 360bbb-3(b)(1), unless the authorization is terminated  or revoked sooner. The test has been validated but independent review by FDA  and CLIA is pending.    Test performed using [employer] Gene[employer]: This RT-PCR assay targets N2,  a region unique to SARS-CoV-2. A conserved region in the E-gene was chosen  for pan-Sarbecovirus detection which includes SARS-CoV-2.    According to CMS-2020-01-R, this platform meets the definition of high-throughput technology.   HS TROPONIN I 0HR - Normal    hs TnI 0hr 25  \"Refer to ACS Flowchart\"- see link ng/L Final    Comment:                                              Initial (time 0) result  If >=50 ng/L, Myocardial injury suggested ;  Type of myocardial injury and treatment strategy  to be determined.  If 5-49 ng/L, a delta result at 2 hours and or 4 hours will be needed to further evaluate.  If <4 ng/L, and chest pain has been >3 hours since onset, patient may qualify for discharge based on the HEART score in the ED.  If <5 ng/L and <3hours since onset of chest pain, a delta result at 2 hours will be needed to further evaluate.    HS Troponin 99th Percentile URL of a Health Population=12 ng/L with a 95% Confidence Interval of 8-18 ng/L.    Second Troponin (time 2 hours)  If calculated delta >= 20 ng/L,  Myocardial injury suggested ; Type of myocardial injury and treatment strategy to be determined.  If 5-49 ng/L and the calculated delta is 5-19 ng/L, consult medical service for evaluation.  Continue evaluation for ischemia on ecg and other possible etiology and repeat hs troponin at 4 hours.  If delta is <5 ng/L at 2 hours, consider discharge based on risk stratification via the HEART score (if in ED), or GERMAINE risk score in IP/Observation.    HS Troponin 99th Percentile URL of a Health Population=12 ng/L with a 95% Confidence " "Interval of 8-18 ng/L.   HS TROPONIN I 2HR - Normal    hs TnI 2hr 23  \"Refer to ACS Flowchart\"- see link ng/L Final    Comment:                                              Initial (time 0) result  If >=50 ng/L, Myocardial injury suggested ;  Type of myocardial injury and treatment strategy  to be determined.  If 5-49 ng/L, a delta result at 2 hours and or 4 hours will be needed to further evaluate.  If <4 ng/L, and chest pain has been >3 hours since onset, patient may qualify for discharge based on the HEART score in the ED.  If <5 ng/L and <3hours since onset of chest pain, a delta result at 2 hours will be needed to further evaluate.    HS Troponin 99th Percentile URL of a Health Population=12 ng/L with a 95% Confidence Interval of 8-18 ng/L.    Second Troponin (time 2 hours)  If calculated delta >= 20 ng/L,  Myocardial injury suggested ; Type of myocardial injury and treatment strategy to be determined.  If 5-49 ng/L and the calculated delta is 5-19 ng/L, consult medical service for evaluation.  Continue evaluation for ischemia on ecg and other possible etiology and repeat hs troponin at 4 hours.  If delta is <5 ng/L at 2 hours, consider discharge based on risk stratification via the HEART score (if in ED), or GERMAINE risk score in IP/Observation.    HS Troponin 99th Percentile URL of a Health Population=12 ng/L with a 95% Confidence Interval of 8-18 ng/L.    Delta 2hr hsTnI -2  <20 ng/L Final   LIGHT BLUE TOP     XR chest 1 view portable   ED Interpretation   No infiltrate, effusion, pneumothorax        Medications   niCARdipine (CARDENE) 25 mg (STANDARD CONCENTRATION) in sodium chloride 0.9% 250 mL (0 mg/hr Intravenous Stopped 1/3/24 1702)   sodium chloride 0.9 % bolus 500 mL (0 mL Intravenous Stopped 1/3/24 1230)   ketorolac (TORADOL) injection 15 mg (15 mg Intravenous Given 1/3/24 1125)   acetaminophen (TYLENOL) tablet 650 mg (650 mg Oral Given 1/3/24 1124)   labetalol (NORMODYNE) tablet 200 mg (200 mg Oral " Given 1/3/24 1234)   labetalol (NORMODYNE) injection 10 mg (10 mg Intravenous Given 1/3/24 1529)   hydrALAZINE (APRESOLINE) injection 10 mg (10 mg Intravenous Given 1/3/24 1553)           Critical Care Time  Procedures    Time reflects when diagnosis was documented in both MDM as applicable and the Disposition within this note       Time User Action Codes Description Comment    1/3/2024  1:13 PM Andrey Lawton [N18.6,  Z99.2] ESRD (end stage renal disease) on dialysis (HCC)     1/3/2024  1:13 PM Andrey Lawton [I10] Hypertension     1/3/2024  1:13 PM Andrey Lawton [R42] Lightheadedness     1/3/2024  1:13 PM Andrey Lawton [R51.9] Headache           ED Disposition       ED Disposition   Discharge    Condition   Stable    Date/Time   Wed Eliezer 3, 2024  1:12 PM    Comment   Mateus Mckeon discharge to home/self care.                   Follow-up Information       Follow up With Specialties Details Why Contact Info    Dania Sher, DO Internal Medicine   [address] [facility]  [address]nd Floor, [address]  [place] [address] [phone]

## 2024-01-04 ENCOUNTER — VBI (OUTPATIENT)
Dept: INTERNAL MEDICINE CLINIC | Facility: CLINIC | Age: 39
End: 2024-01-04

## 2024-01-04 ENCOUNTER — APPOINTMENT (EMERGENCY)
Dept: RADIOLOGY | Facility: HOSPITAL | Age: 39
DRG: 682 | End: 2024-01-04
Payer: MEDICARE

## 2024-01-04 ENCOUNTER — HOSPITAL ENCOUNTER (INPATIENT)
Facility: HOSPITAL | Age: 39
LOS: 1 days | DRG: 682 | End: 2024-01-05
Attending: EMERGENCY MEDICINE | Admitting: FAMILY MEDICINE
Payer: MEDICARE

## 2024-01-04 DIAGNOSIS — I10 HYPERTENSION: Primary | ICD-10-CM

## 2024-01-04 DIAGNOSIS — I60.9 SAH (SUBARACHNOID HEMORRHAGE) (HCC): ICD-10-CM

## 2024-01-04 DIAGNOSIS — N18.6 ESRD (END STAGE RENAL DISEASE) (HCC): ICD-10-CM

## 2024-01-04 LAB
ALBUMIN SERPL BCP-MCNC: 4.1 G/DL (ref 3.5–5)
ALP SERPL-CCNC: 79 U/L (ref 34–104)
ALT SERPL W P-5'-P-CCNC: 10 U/L (ref 7–52)
ANION GAP SERPL CALCULATED.3IONS-SCNC: 17 MMOL/L
AST SERPL W P-5'-P-CCNC: 15 U/L (ref 13–39)
ATRIAL RATE: 65 BPM
ATRIAL RATE: 67 BPM
BASOPHILS # BLD AUTO: 0.06 THOUSANDS/ÂΜL (ref 0–0.1)
BASOPHILS NFR BLD AUTO: 1 % (ref 0–1)
BILIRUB SERPL-MCNC: 0.62 MG/DL (ref 0.2–1)
BUN SERPL-MCNC: 56 MG/DL (ref 5–25)
CALCIUM SERPL-MCNC: 9 MG/DL (ref 8.4–10.2)
CHLORIDE SERPL-SCNC: 91 MMOL/L (ref 96–108)
CO2 SERPL-SCNC: 27 MMOL/L (ref 21–32)
CREAT SERPL-MCNC: 13.49 MG/DL (ref 0.6–1.3)
EOSINOPHIL # BLD AUTO: 0.33 THOUSAND/ÂΜL (ref 0–0.61)
EOSINOPHIL NFR BLD AUTO: 4 % (ref 0–6)
ERYTHROCYTE [DISTWIDTH] IN BLOOD BY AUTOMATED COUNT: 13.8 % (ref 11.6–15.1)
GFR SERPL CREATININE-BSD FRML MDRD: 4 ML/MIN/1.73SQ M
GLUCOSE SERPL-MCNC: 193 MG/DL (ref 65–140)
HCT VFR BLD AUTO: 34.3 % (ref 36.5–49.3)
HGB BLD-MCNC: 11.5 G/DL (ref 12–17)
IMM GRANULOCYTES # BLD AUTO: 0.03 THOUSAND/UL (ref 0–0.2)
IMM GRANULOCYTES NFR BLD AUTO: 0 % (ref 0–2)
LYMPHOCYTES # BLD AUTO: 1.44 THOUSANDS/ÂΜL (ref 0.6–4.47)
LYMPHOCYTES NFR BLD AUTO: 17 % (ref 14–44)
MCH RBC QN AUTO: 31.6 PG (ref 26.8–34.3)
MCHC RBC AUTO-ENTMCNC: 33.5 G/DL (ref 31.4–37.4)
MCV RBC AUTO: 94 FL (ref 82–98)
MONOCYTES # BLD AUTO: 0.59 THOUSAND/ÂΜL (ref 0.17–1.22)
MONOCYTES NFR BLD AUTO: 7 % (ref 4–12)
NEUTROPHILS # BLD AUTO: 6.01 THOUSANDS/ÂΜL (ref 1.85–7.62)
NEUTS SEG NFR BLD AUTO: 71 % (ref 43–75)
NRBC BLD AUTO-RTO: 0 /100 WBCS
P AXIS: 49 DEGREES
P AXIS: 70 DEGREES
PLATELET # BLD AUTO: 210 THOUSANDS/UL (ref 149–390)
PMV BLD AUTO: 10 FL (ref 8.9–12.7)
POTASSIUM SERPL-SCNC: 5.3 MMOL/L (ref 3.5–5.3)
PR INTERVAL: 174 MS
PR INTERVAL: 192 MS
PROT SERPL-MCNC: 6.3 G/DL (ref 6.4–8.4)
QRS AXIS: 25 DEGREES
QRS AXIS: 69 DEGREES
QRSD INTERVAL: 76 MS
QRSD INTERVAL: 78 MS
QT INTERVAL: 472 MS
QT INTERVAL: 502 MS
QTC INTERVAL: 498 MS
QTC INTERVAL: 522 MS
RBC # BLD AUTO: 3.64 MILLION/UL (ref 3.88–5.62)
SODIUM SERPL-SCNC: 135 MMOL/L (ref 135–147)
T WAVE AXIS: 76 DEGREES
T WAVE AXIS: 83 DEGREES
VENTRICULAR RATE: 65 BPM
VENTRICULAR RATE: 67 BPM
WBC # BLD AUTO: 8.46 THOUSAND/UL (ref 4.31–10.16)

## 2024-01-04 PROCEDURE — 96375 TX/PRO/DX INJ NEW DRUG ADDON: CPT

## 2024-01-04 PROCEDURE — 99285 EMERGENCY DEPT VISIT HI MDM: CPT

## 2024-01-04 PROCEDURE — 85025 COMPLETE CBC W/AUTO DIFF WBC: CPT

## 2024-01-04 PROCEDURE — 96374 THER/PROPH/DIAG INJ IV PUSH: CPT

## 2024-01-04 PROCEDURE — 71045 X-RAY EXAM CHEST 1 VIEW: CPT

## 2024-01-04 PROCEDURE — 96376 TX/PRO/DX INJ SAME DRUG ADON: CPT

## 2024-01-04 PROCEDURE — 93005 ELECTROCARDIOGRAM TRACING: CPT

## 2024-01-04 PROCEDURE — 80053 COMPREHEN METABOLIC PANEL: CPT

## 2024-01-04 PROCEDURE — 36415 COLL VENOUS BLD VENIPUNCTURE: CPT

## 2024-01-04 RX ORDER — ONDANSETRON 2 MG/ML
INJECTION INTRAMUSCULAR; INTRAVENOUS
Status: COMPLETED
Start: 2024-01-04 | End: 2024-01-05

## 2024-01-04 RX ORDER — LABETALOL HYDROCHLORIDE 5 MG/ML
20 INJECTION, SOLUTION INTRAVENOUS ONCE
Status: COMPLETED | OUTPATIENT
Start: 2024-01-04 | End: 2024-01-04

## 2024-01-04 RX ORDER — ONDANSETRON 2 MG/ML
4 INJECTION INTRAMUSCULAR; INTRAVENOUS ONCE
Status: COMPLETED | OUTPATIENT
Start: 2024-01-04 | End: 2024-01-04

## 2024-01-04 RX ADMIN — Medication 20 MG: at 22:58

## 2024-01-04 RX ADMIN — ONDANSETRON 4 MG: 2 INJECTION INTRAMUSCULAR; INTRAVENOUS at 22:56

## 2024-01-04 RX ADMIN — LABETALOL HYDROCHLORIDE 20 MG: 5 INJECTION, SOLUTION INTRAVENOUS at 23:50

## 2024-01-04 NOTE — Clinical Note
Case was discussed with Dr Finn and the patient's admission status was agreed to be Admission Status: inpatient status to the service of Dr. Hernandez .

## 2024-01-04 NOTE — TELEPHONE ENCOUNTER
01/04/24 12:56 PM    Patient contacted post ED visit, first outreach attempt made. Message was left for patient to return a call to the [facility] Department at [facility]: Phone [phone].    Thank you.  Amina Chauhan  [facility]

## 2024-01-05 ENCOUNTER — APPOINTMENT (INPATIENT)
Dept: RADIOLOGY | Facility: HOSPITAL | Age: 39
DRG: 064 | End: 2024-01-05
Attending: NEUROLOGICAL SURGERY
Payer: MEDICARE

## 2024-01-05 ENCOUNTER — TRANSITIONAL CARE MANAGEMENT (OUTPATIENT)
Dept: INTERNAL MEDICINE CLINIC | Facility: CLINIC | Age: 39
End: 2024-01-05

## 2024-01-05 ENCOUNTER — APPOINTMENT (INPATIENT)
Dept: CT IMAGING | Facility: HOSPITAL | Age: 39
DRG: 682 | End: 2024-01-05
Payer: MEDICARE

## 2024-01-05 ENCOUNTER — ANESTHESIA (INPATIENT)
Dept: RADIOLOGY | Facility: HOSPITAL | Age: 39
End: 2024-01-05
Payer: MEDICARE

## 2024-01-05 ENCOUNTER — APPOINTMENT (INPATIENT)
Dept: NON INVASIVE DIAGNOSTICS | Facility: HOSPITAL | Age: 39
DRG: 064 | End: 2024-01-05
Payer: MEDICARE

## 2024-01-05 ENCOUNTER — HOSPITAL ENCOUNTER (INPATIENT)
Facility: HOSPITAL | Age: 39
LOS: 18 days | Discharge: HOME | DRG: 064 | End: 2024-01-23
Attending: INTERNAL MEDICINE | Admitting: INTERNAL MEDICINE
Payer: MEDICARE

## 2024-01-05 ENCOUNTER — ANESTHESIA EVENT (INPATIENT)
Dept: RADIOLOGY | Facility: HOSPITAL | Age: 39
End: 2024-01-05
Payer: MEDICARE

## 2024-01-05 ENCOUNTER — APPOINTMENT (INPATIENT)
Dept: DIALYSIS | Facility: HOSPITAL | Age: 39
DRG: 064 | End: 2024-01-05
Payer: MEDICARE

## 2024-01-05 VITALS
BODY MASS INDEX: 29.3 KG/M2 | TEMPERATURE: 97.8 F | SYSTOLIC BLOOD PRESSURE: 194 MMHG | OXYGEN SATURATION: 99 % | WEIGHT: 210.1 LBS | DIASTOLIC BLOOD PRESSURE: 97 MMHG | HEART RATE: 80 BPM | RESPIRATION RATE: 18 BRPM

## 2024-01-05 DIAGNOSIS — R29.898 RUE WEAKNESS: ICD-10-CM

## 2024-01-05 DIAGNOSIS — I60.6 NONTRAUMATIC SUBARACHNOID HEMORRHAGE FROM OTHER INTRACRANIAL ARTERIES (HCC): ICD-10-CM

## 2024-01-05 DIAGNOSIS — I15.0 RENOVASCULAR HYPERTENSION: ICD-10-CM

## 2024-01-05 DIAGNOSIS — R29.898 RIGHT HAND WEAKNESS: ICD-10-CM

## 2024-01-05 DIAGNOSIS — I60.9 SAH (SUBARACHNOID HEMORRHAGE) (HCC): Primary | ICD-10-CM

## 2024-01-05 DIAGNOSIS — I16.0 HYPERTENSIVE URGENCY: ICD-10-CM

## 2024-01-05 DIAGNOSIS — H74.91 MASTOID DISORDER, RIGHT: ICD-10-CM

## 2024-01-05 DIAGNOSIS — E10.9 TYPE 1 DIABETES MELLITUS ON INSULIN THERAPY (HCC): ICD-10-CM

## 2024-01-05 DIAGNOSIS — I60.9 SUBARACHNOID HEMORRHAGE (HCC): ICD-10-CM

## 2024-01-05 DIAGNOSIS — N18.6 END STAGE KIDNEY DISEASE (HCC): ICD-10-CM

## 2024-01-05 DIAGNOSIS — I10 UNCONTROLLED HYPERTENSION: ICD-10-CM

## 2024-01-05 DIAGNOSIS — R90.89 ABNORMAL FINDING ON MRI OF BRAIN: ICD-10-CM

## 2024-01-05 DIAGNOSIS — I10 PRIMARY HYPERTENSION: ICD-10-CM

## 2024-01-05 DIAGNOSIS — L60.2 HYPERTROPHIC TOENAIL: ICD-10-CM

## 2024-01-05 DIAGNOSIS — E10.42 DIABETIC POLYNEUROPATHY ASSOCIATED WITH TYPE 1 DIABETES MELLITUS (HCC): ICD-10-CM

## 2024-01-05 DIAGNOSIS — N18.6 ESRD (END STAGE RENAL DISEASE) (HCC): ICD-10-CM

## 2024-01-05 DIAGNOSIS — I63.9 ACUTE CVA (CEREBROVASCULAR ACCIDENT) (HCC): ICD-10-CM

## 2024-01-05 DIAGNOSIS — I16.1 HYPERTENSIVE EMERGENCY: ICD-10-CM

## 2024-01-05 PROBLEM — Z99.2 ANEMIA DUE TO CHRONIC KIDNEY DISEASE, ON CHRONIC DIALYSIS (HCC): Status: ACTIVE | Noted: 2024-01-05

## 2024-01-05 PROBLEM — G46.4 CEREBELLAR STROKE SYNDROME: Status: ACTIVE | Noted: 2022-01-06

## 2024-01-05 PROBLEM — D63.1 ANEMIA IN CHRONIC KIDNEY DISEASE: Status: ACTIVE | Noted: 2022-01-06

## 2024-01-05 PROBLEM — D63.1 ANEMIA DUE TO CHRONIC KIDNEY DISEASE, ON CHRONIC DIALYSIS (HCC): Status: ACTIVE | Noted: 2024-01-05

## 2024-01-05 PROBLEM — N18.9 ANEMIA IN CHRONIC KIDNEY DISEASE: Status: ACTIVE | Noted: 2022-01-06

## 2024-01-05 PROBLEM — R60.0 BILATERAL LEG EDEMA: Status: ACTIVE | Noted: 2018-02-19

## 2024-01-05 LAB
AORTIC ROOT: 3.4 CM
APICAL FOUR CHAMBER EJECTION FRACTION: 62 %
APTT PPP: 29 SECONDS (ref 23–37)
APTT PPP: 30 SECONDS (ref 23–37)
ASCENDING AORTA: 3.3 CM
ATRIAL RATE: 68 BPM
ATRIAL RATE: 88 BPM
E WAVE DECELERATION TIME: 304 MS
E/A RATIO: 0.99
FRACTIONAL SHORTENING: 44 (ref 28–44)
GLUCOSE SERPL-MCNC: 150 MG/DL (ref 65–140)
GLUCOSE SERPL-MCNC: 152 MG/DL (ref 65–140)
GLUCOSE SERPL-MCNC: 159 MG/DL (ref 65–140)
GLUCOSE SERPL-MCNC: 166 MG/DL (ref 65–140)
GLUCOSE SERPL-MCNC: 185 MG/DL (ref 65–140)
GLUCOSE SERPL-MCNC: 196 MG/DL (ref 65–140)
GLUCOSE SERPL-MCNC: 225 MG/DL (ref 65–140)
INR PPP: 1.04 (ref 0.84–1.19)
INTERVENTRICULAR SEPTUM IN DIASTOLE (PARASTERNAL SHORT AXIS VIEW): 1.5 CM
INTERVENTRICULAR SEPTUM: 1.5 CM (ref 0.6–1.1)
LAAS-AP2: 34.7 CM2
LAAS-AP4: 36.9 CM2
LEFT ATRIUM SIZE: 3.9 CM
LEFT ATRIUM VOLUME (MOD BIPLANE): 145 ML
LEFT ATRIUM VOLUME INDEX (MOD BIPLANE): 71.8 ML/M2
LEFT INTERNAL DIMENSION IN SYSTOLE: 2.7 CM (ref 2.1–4)
LEFT VENTRICULAR INTERNAL DIMENSION IN DIASTOLE: 4.8 CM (ref 3.5–6)
LEFT VENTRICULAR POSTERIOR WALL IN END DIASTOLE: 1.5 CM
LEFT VENTRICULAR STROKE VOLUME: 79 ML
LVSV (TEICH): 79 ML
MV E'TISSUE VEL-SEP: 9 CM/S
MV PEAK A VEL: 1.38 M/S
MV PEAK E VEL: 136 CM/S
MV STENOSIS PRESSURE HALF TIME: 88 MS
MV VALVE AREA P 1/2 METHOD: 2.5
P AXIS: 56 DEGREES
P AXIS: 69 DEGREES
POTASSIUM SERPL-SCNC: 4.7 MMOL/L (ref 3.5–5.3)
PR INTERVAL: 171 MS
PR INTERVAL: 174 MS
PROTHROMBIN TIME: 14.2 SECONDS (ref 11.6–14.5)
QRS AXIS: 59 DEGREES
QRS AXIS: 60 DEGREES
QRSD INTERVAL: 70 MS
QRSD INTERVAL: 79 MS
QT INTERVAL: 417 MS
QT INTERVAL: 456 MS
QTC INTERVAL: 484 MS
QTC INTERVAL: 505 MS
RIGHT ATRIUM AREA SYSTOLE A4C: 19.6 CM2
RIGHT VENTRICLE ID DIMENSION: 4.1 CM
SL CV LEFT ATRIUM LENGTH A2C: 7.1 CM
SL CV LV EF: 70
SL CV PED ECHO LEFT VENTRICLE DIASTOLIC VOLUME (MOD BIPLANE) 2D: 105 ML
SL CV PED ECHO LEFT VENTRICLE SYSTOLIC VOLUME (MOD BIPLANE) 2D: 27 ML
T WAVE AXIS: 22 DEGREES
T WAVE AXIS: 77 DEGREES
TRICUSPID ANNULAR PLANE SYSTOLIC EXCURSION: 2.5 CM
VENTRICULAR RATE: 68 BPM
VENTRICULAR RATE: 88 BPM

## 2024-01-05 PROCEDURE — 70496 CT ANGIOGRAPHY HEAD: CPT

## 2024-01-05 PROCEDURE — 99291 CRITICAL CARE FIRST HOUR: CPT | Performed by: INTERNAL MEDICINE

## 2024-01-05 PROCEDURE — 85610 PROTHROMBIN TIME: CPT

## 2024-01-05 PROCEDURE — 5A1D70Z PERFORMANCE OF URINARY FILTRATION, INTERMITTENT, LESS THAN 6 HOURS PER DAY: ICD-10-PCS | Performed by: INTERNAL MEDICINE

## 2024-01-05 PROCEDURE — C1887 CATHETER, GUIDING: HCPCS

## 2024-01-05 PROCEDURE — G1004 CDSM NDSC: HCPCS

## 2024-01-05 PROCEDURE — B30HZZZ PLAIN RADIOGRAPHY OF RIGHT UPPER EXTREMITY ARTERIES: ICD-10-PCS | Performed by: NEUROLOGICAL SURGERY

## 2024-01-05 PROCEDURE — NC001 PR NO CHARGE: Performed by: NEUROLOGICAL SURGERY

## 2024-01-05 PROCEDURE — 85730 THROMBOPLASTIN TIME PARTIAL: CPT

## 2024-01-05 PROCEDURE — 94760 N-INVAS EAR/PLS OXIMETRY 1: CPT

## 2024-01-05 PROCEDURE — 82948 REAGENT STRIP/BLOOD GLUCOSE: CPT

## 2024-01-05 PROCEDURE — 93306 TTE W/DOPPLER COMPLETE: CPT

## 2024-01-05 PROCEDURE — 36415 COLL VENOUS BLD VENIPUNCTURE: CPT

## 2024-01-05 PROCEDURE — 36224 PLACE CATH CAROTD ART: CPT

## 2024-01-05 PROCEDURE — C1894 INTRO/SHEATH, NON-LASER: HCPCS

## 2024-01-05 PROCEDURE — B305ZZZ PLAIN RADIOGRAPHY OF BILATERAL COMMON CAROTID ARTERIES: ICD-10-PCS | Performed by: NEUROLOGICAL SURGERY

## 2024-01-05 PROCEDURE — C1760 CLOSURE DEV, VASC: HCPCS

## 2024-01-05 PROCEDURE — 70498 CT ANGIOGRAPHY NECK: CPT

## 2024-01-05 PROCEDURE — C1769 GUIDE WIRE: HCPCS

## 2024-01-05 PROCEDURE — 36224 PLACE CATH CAROTD ART: CPT | Performed by: NEUROLOGICAL SURGERY

## 2024-01-05 PROCEDURE — 99223 1ST HOSP IP/OBS HIGH 75: CPT | Performed by: INTERNAL MEDICINE

## 2024-01-05 PROCEDURE — 36226 PLACE CATH VERTEBRAL ART: CPT

## 2024-01-05 PROCEDURE — 93886 INTRACRANIAL COMPLETE STUDY: CPT | Performed by: SURGERY

## 2024-01-05 PROCEDURE — 93306 TTE W/DOPPLER COMPLETE: CPT | Performed by: INTERNAL MEDICINE

## 2024-01-05 PROCEDURE — B30FZZZ PLAIN RADIOGRAPHY OF LEFT VERTEBRAL ARTERY: ICD-10-PCS | Performed by: NEUROLOGICAL SURGERY

## 2024-01-05 PROCEDURE — B308ZZZ PLAIN RADIOGRAPHY OF BILATERAL INTERNAL CAROTID ARTERIES: ICD-10-PCS | Performed by: NEUROLOGICAL SURGERY

## 2024-01-05 PROCEDURE — 90935 HEMODIALYSIS ONE EVALUATION: CPT | Performed by: INTERNAL MEDICINE

## 2024-01-05 PROCEDURE — 84132 ASSAY OF SERUM POTASSIUM: CPT

## 2024-01-05 PROCEDURE — 36223 PLACE CATH CAROTID/INOM ART: CPT

## 2024-01-05 PROCEDURE — 76937 US GUIDE VASCULAR ACCESS: CPT

## 2024-01-05 PROCEDURE — 93886 INTRACRANIAL COMPLETE STUDY: CPT

## 2024-01-05 PROCEDURE — 99223 1ST HOSP IP/OBS HIGH 75: CPT | Performed by: NEUROLOGICAL SURGERY

## 2024-01-05 PROCEDURE — 70450 CT HEAD/BRAIN W/O DYE: CPT

## 2024-01-05 PROCEDURE — 96376 TX/PRO/DX INJ SAME DRUG ADON: CPT

## 2024-01-05 PROCEDURE — 93005 ELECTROCARDIOGRAM TRACING: CPT

## 2024-01-05 PROCEDURE — 36226 PLACE CATH VERTEBRAL ART: CPT | Performed by: NEUROLOGICAL SURGERY

## 2024-01-05 RX ORDER — FAMOTIDINE 10 MG/ML
20 INJECTION, SOLUTION INTRAVENOUS
Status: DISCONTINUED | OUTPATIENT
Start: 2024-01-05 | End: 2024-01-05

## 2024-01-05 RX ORDER — CINACALCET 30 MG/1
120 TABLET, FILM COATED ORAL DAILY
Status: DISCONTINUED | OUTPATIENT
Start: 2024-01-05 | End: 2024-01-05

## 2024-01-05 RX ORDER — FENTANYL CITRATE 50 UG/ML
INJECTION, SOLUTION INTRAMUSCULAR; INTRAVENOUS AS NEEDED
Status: DISCONTINUED | OUTPATIENT
Start: 2024-01-05 | End: 2024-01-05

## 2024-01-05 RX ORDER — HYDROMORPHONE HCL IN WATER/PF 6 MG/30 ML
0.2 PATIENT CONTROLLED ANALGESIA SYRINGE INTRAVENOUS EVERY 6 HOURS PRN
Status: DISCONTINUED | OUTPATIENT
Start: 2024-01-05 | End: 2024-01-05

## 2024-01-05 RX ORDER — ONDANSETRON 2 MG/ML
4 INJECTION INTRAMUSCULAR; INTRAVENOUS ONCE
Status: COMPLETED | OUTPATIENT
Start: 2024-01-05 | End: 2024-01-05

## 2024-01-05 RX ORDER — LABETALOL 200 MG/1
200 TABLET, FILM COATED ORAL 3 TIMES DAILY
Status: DISCONTINUED | OUTPATIENT
Start: 2024-01-05 | End: 2024-01-06

## 2024-01-05 RX ORDER — CINACALCET 30 MG/1
60 TABLET, FILM COATED ORAL DAILY
Status: DISCONTINUED | OUTPATIENT
Start: 2024-01-06 | End: 2024-01-23 | Stop reason: HOSPADM

## 2024-01-05 RX ORDER — NIMODIPINE 30 MG/1
60 CAPSULE, LIQUID FILLED ORAL
Status: DISCONTINUED | OUTPATIENT
Start: 2024-01-05 | End: 2024-01-12

## 2024-01-05 RX ORDER — ACETAMINOPHEN 325 MG/1
975 TABLET ORAL EVERY 6 HOURS SCHEDULED
Status: DISCONTINUED | OUTPATIENT
Start: 2024-01-05 | End: 2024-01-10

## 2024-01-05 RX ORDER — METOCLOPRAMIDE HYDROCHLORIDE 5 MG/ML
5 INJECTION INTRAMUSCULAR; INTRAVENOUS EVERY 6 HOURS PRN
Status: DISCONTINUED | OUTPATIENT
Start: 2024-01-05 | End: 2024-01-06

## 2024-01-05 RX ORDER — SODIUM CHLORIDE 9 MG/ML
INJECTION, SOLUTION INTRAVENOUS CONTINUOUS PRN
Status: DISCONTINUED | OUTPATIENT
Start: 2024-01-05 | End: 2024-01-05

## 2024-01-05 RX ORDER — ONDANSETRON 2 MG/ML
4 INJECTION INTRAMUSCULAR; INTRAVENOUS EVERY 4 HOURS
Status: DISCONTINUED | OUTPATIENT
Start: 2024-01-05 | End: 2024-01-05

## 2024-01-05 RX ORDER — LIDOCAINE HYDROCHLORIDE 10 MG/ML
INJECTION, SOLUTION EPIDURAL; INFILTRATION; INTRACAUDAL; PERINEURAL AS NEEDED
Status: DISCONTINUED | OUTPATIENT
Start: 2024-01-05 | End: 2024-01-05

## 2024-01-05 RX ORDER — LISINOPRIL 20 MG/1
80 TABLET ORAL DAILY
Status: DISCONTINUED | OUTPATIENT
Start: 2024-01-05 | End: 2024-01-05

## 2024-01-05 RX ORDER — ONDANSETRON 2 MG/ML
INJECTION INTRAMUSCULAR; INTRAVENOUS AS NEEDED
Status: DISCONTINUED | OUTPATIENT
Start: 2024-01-05 | End: 2024-01-05

## 2024-01-05 RX ORDER — CHLORHEXIDINE GLUCONATE ORAL RINSE 1.2 MG/ML
15 SOLUTION DENTAL EVERY 12 HOURS SCHEDULED
Status: DISCONTINUED | OUTPATIENT
Start: 2024-01-05 | End: 2024-01-11

## 2024-01-05 RX ORDER — FAMOTIDINE 20 MG/1
40 TABLET, FILM COATED ORAL DAILY
Status: DISCONTINUED | OUTPATIENT
Start: 2024-01-05 | End: 2024-01-23 | Stop reason: HOSPADM

## 2024-01-05 RX ORDER — NIMODIPINE 30 MG/1
60 CAPSULE, LIQUID FILLED ORAL
Status: DISCONTINUED | OUTPATIENT
Start: 2024-01-05 | End: 2024-01-05

## 2024-01-05 RX ORDER — DILTIAZEM HYDROCHLORIDE 5 MG/ML
10 INJECTION INTRAVENOUS ONCE
Status: DISCONTINUED | OUTPATIENT
Start: 2024-01-05 | End: 2024-01-05

## 2024-01-05 RX ORDER — ROCURONIUM BROMIDE 10 MG/ML
INJECTION, SOLUTION INTRAVENOUS AS NEEDED
Status: DISCONTINUED | OUTPATIENT
Start: 2024-01-05 | End: 2024-01-05

## 2024-01-05 RX ORDER — HYDROMORPHONE HCL IN WATER/PF 6 MG/30 ML
0.2 PATIENT CONTROLLED ANALGESIA SYRINGE INTRAVENOUS EVERY 4 HOURS PRN
Status: DISCONTINUED | OUTPATIENT
Start: 2024-01-05 | End: 2024-01-07

## 2024-01-05 RX ORDER — CALCIUM ACETATE 667 MG/1
667 CAPSULE ORAL
Status: DISCONTINUED | OUTPATIENT
Start: 2024-01-05 | End: 2024-01-23 | Stop reason: HOSPADM

## 2024-01-05 RX ORDER — ATORVASTATIN CALCIUM 40 MG/1
40 TABLET, FILM COATED ORAL EVERY EVENING
Status: DISCONTINUED | OUTPATIENT
Start: 2024-01-05 | End: 2024-01-23 | Stop reason: HOSPADM

## 2024-01-05 RX ORDER — INSULIN LISPRO 100 [IU]/ML
1-6 INJECTION, SOLUTION INTRAVENOUS; SUBCUTANEOUS
Status: DISCONTINUED | OUTPATIENT
Start: 2024-01-05 | End: 2024-01-05

## 2024-01-05 RX ORDER — HYDRALAZINE HYDROCHLORIDE 20 MG/ML
10 INJECTION INTRAMUSCULAR; INTRAVENOUS EVERY 6 HOURS SCHEDULED
Status: DISCONTINUED | OUTPATIENT
Start: 2024-01-05 | End: 2024-01-05

## 2024-01-05 RX ORDER — IODIXANOL 320 MG/ML
150 INJECTION, SOLUTION INTRAVASCULAR
Status: COMPLETED | OUTPATIENT
Start: 2024-01-05 | End: 2024-01-05

## 2024-01-05 RX ORDER — LISINOPRIL 20 MG/1
40 TABLET ORAL DAILY
Status: DISCONTINUED | OUTPATIENT
Start: 2024-01-06 | End: 2024-01-08

## 2024-01-05 RX ORDER — HYDRALAZINE HYDROCHLORIDE 25 MG/1
25 TABLET, FILM COATED ORAL 3 TIMES DAILY
Status: DISCONTINUED | OUTPATIENT
Start: 2024-01-05 | End: 2024-01-09

## 2024-01-05 RX ORDER — NICARDIPINE HYDROCHLORIDE 2.5 MG/ML
INJECTION INTRAVENOUS
Status: COMPLETED
Start: 2024-01-05 | End: 2024-01-05

## 2024-01-05 RX ORDER — LABETALOL HYDROCHLORIDE 5 MG/ML
20 INJECTION, SOLUTION INTRAVENOUS ONCE
Status: COMPLETED | OUTPATIENT
Start: 2024-01-05 | End: 2024-01-05

## 2024-01-05 RX ORDER — GABAPENTIN 100 MG/1
100 CAPSULE ORAL
Status: DISCONTINUED | OUTPATIENT
Start: 2024-01-05 | End: 2024-01-07

## 2024-01-05 RX ORDER — ONDANSETRON 2 MG/ML
4 INJECTION INTRAMUSCULAR; INTRAVENOUS EVERY 4 HOURS PRN
Status: DISCONTINUED | OUTPATIENT
Start: 2024-01-05 | End: 2024-01-23 | Stop reason: HOSPADM

## 2024-01-05 RX ORDER — NICARDIPINE HYDROCHLORIDE 2.5 MG/ML
INJECTION INTRAVENOUS
Status: DISPENSED
Start: 2024-01-05 | End: 2024-01-05

## 2024-01-05 RX ORDER — HYDRALAZINE HYDROCHLORIDE 20 MG/ML
10 INJECTION INTRAMUSCULAR; INTRAVENOUS EVERY 4 HOURS PRN
Status: DISCONTINUED | OUTPATIENT
Start: 2024-01-05 | End: 2024-01-09

## 2024-01-05 RX ORDER — PROPOFOL 10 MG/ML
INJECTION, EMULSION INTRAVENOUS AS NEEDED
Status: DISCONTINUED | OUTPATIENT
Start: 2024-01-05 | End: 2024-01-05

## 2024-01-05 RX ORDER — BISACODYL 10 MG
10 SUPPOSITORY, RECTAL RECTAL DAILY PRN
Status: DISCONTINUED | OUTPATIENT
Start: 2024-01-05 | End: 2024-01-23 | Stop reason: HOSPADM

## 2024-01-05 RX ORDER — ESCITALOPRAM OXALATE 20 MG/1
20 TABLET ORAL DAILY
Status: DISCONTINUED | OUTPATIENT
Start: 2024-01-05 | End: 2024-01-16

## 2024-01-05 RX ORDER — ONDANSETRON 2 MG/ML
4 INJECTION INTRAMUSCULAR; INTRAVENOUS EVERY 6 HOURS PRN
Status: DISCONTINUED | OUTPATIENT
Start: 2024-01-05 | End: 2024-01-05

## 2024-01-05 RX ORDER — CLONIDINE 0.3 MG/24H
0.3 PATCH, EXTENDED RELEASE TRANSDERMAL WEEKLY
Status: DISCONTINUED | OUTPATIENT
Start: 2024-01-05 | End: 2024-01-05

## 2024-01-05 RX ORDER — METOCLOPRAMIDE HYDROCHLORIDE 5 MG/ML
5 INJECTION INTRAMUSCULAR; INTRAVENOUS ONCE
Status: COMPLETED | OUTPATIENT
Start: 2024-01-05 | End: 2024-01-05

## 2024-01-05 RX ORDER — NIFEDIPINE 30 MG/1
90 TABLET, EXTENDED RELEASE ORAL
Status: DISCONTINUED | OUTPATIENT
Start: 2024-01-05 | End: 2024-01-14

## 2024-01-05 RX ORDER — CLONIDINE 0.3 MG/24H
0.3 PATCH, EXTENDED RELEASE TRANSDERMAL WEEKLY
Status: DISCONTINUED | OUTPATIENT
Start: 2024-01-05 | End: 2024-01-15

## 2024-01-05 RX ORDER — PROPOFOL 10 MG/ML
INJECTION, EMULSION INTRAVENOUS CONTINUOUS PRN
Status: DISCONTINUED | OUTPATIENT
Start: 2024-01-05 | End: 2024-01-05

## 2024-01-05 RX ORDER — INSULIN LISPRO 100 [IU]/ML
1-6 INJECTION, SOLUTION INTRAVENOUS; SUBCUTANEOUS EVERY 6 HOURS SCHEDULED
Status: DISCONTINUED | OUTPATIENT
Start: 2024-01-05 | End: 2024-01-05

## 2024-01-05 RX ORDER — ONDANSETRON 2 MG/ML
INJECTION INTRAMUSCULAR; INTRAVENOUS
Status: DISPENSED
Start: 2024-01-05 | End: 2024-01-05

## 2024-01-05 RX ORDER — DEXAMETHASONE SODIUM PHOSPHATE 10 MG/ML
INJECTION, SOLUTION INTRAMUSCULAR; INTRAVENOUS AS NEEDED
Status: DISCONTINUED | OUTPATIENT
Start: 2024-01-05 | End: 2024-01-05

## 2024-01-05 RX ORDER — HYDRALAZINE HYDROCHLORIDE 20 MG/ML
10 INJECTION INTRAMUSCULAR; INTRAVENOUS EVERY 4 HOURS PRN
Status: DISCONTINUED | OUTPATIENT
Start: 2024-01-05 | End: 2024-01-05

## 2024-01-05 RX ORDER — CINACALCET 30 MG/1
90 TABLET, FILM COATED ORAL DAILY
Status: DISCONTINUED | OUTPATIENT
Start: 2024-01-06 | End: 2024-01-05

## 2024-01-05 RX ADMIN — NICARDIPINE HYDROCHLORIDE 12.5 MG/HR: 2.5 INJECTION, SOLUTION INTRAVENOUS at 21:57

## 2024-01-05 RX ADMIN — ROCURONIUM BROMIDE 20 MG: 10 INJECTION, SOLUTION INTRAVENOUS at 12:48

## 2024-01-05 RX ADMIN — METOCLOPRAMIDE HYDROCHLORIDE 5 MG: 5 INJECTION INTRAMUSCULAR; INTRAVENOUS at 19:16

## 2024-01-05 RX ADMIN — LABETALOL HYDROCHLORIDE 200 MG: 200 TABLET, FILM COATED ORAL at 10:01

## 2024-01-05 RX ADMIN — LABETALOL HYDROCHLORIDE 200 MG: 200 TABLET, FILM COATED ORAL at 16:35

## 2024-01-05 RX ADMIN — PROPOFOL 50 MCG/KG/MIN: 10 INJECTION, EMULSION INTRAVENOUS at 12:23

## 2024-01-05 RX ADMIN — LABETALOL HYDROCHLORIDE 200 MG: 200 TABLET, FILM COATED ORAL at 21:37

## 2024-01-05 RX ADMIN — GABAPENTIN 100 MG: 100 CAPSULE ORAL at 21:37

## 2024-01-05 RX ADMIN — HYDRALAZINE HYDROCHLORIDE 25 MG: 25 TABLET, FILM COATED ORAL at 10:04

## 2024-01-05 RX ADMIN — NICARDIPINE HYDROCHLORIDE 15 MG/HR: 2.5 INJECTION, SOLUTION INTRAVENOUS at 18:17

## 2024-01-05 RX ADMIN — CALCIUM ACETATE 667 MG: 667 CAPSULE ORAL at 16:35

## 2024-01-05 RX ADMIN — CHLORHEXIDINE GLUCONATE 15 ML: 1.2 SOLUTION ORAL at 08:14

## 2024-01-05 RX ADMIN — NICARDIPINE HYDROCHLORIDE 15 MG/HR: 2.5 INJECTION, SOLUTION INTRAVENOUS at 05:38

## 2024-01-05 RX ADMIN — CHLORHEXIDINE GLUCONATE 15 ML: 1.2 SOLUTION ORAL at 21:37

## 2024-01-05 RX ADMIN — HYDROMORPHONE HYDROCHLORIDE 0.2 MG: 0.2 INJECTION, SOLUTION INTRAMUSCULAR; INTRAVENOUS; SUBCUTANEOUS at 19:36

## 2024-01-05 RX ADMIN — NICARDIPINE HYDROCHLORIDE 2 MG/HR: 2.5 INJECTION, SOLUTION INTRAVENOUS at 03:52

## 2024-01-05 RX ADMIN — FENTANYL CITRATE 100 MCG: 50 INJECTION INTRAMUSCULAR; INTRAVENOUS at 12:16

## 2024-01-05 RX ADMIN — NIFEDIPINE 90 MG: 30 TABLET, FILM COATED, EXTENDED RELEASE ORAL at 21:37

## 2024-01-05 RX ADMIN — ATORVASTATIN CALCIUM 40 MG: 40 TABLET, FILM COATED ORAL at 17:45

## 2024-01-05 RX ADMIN — PROPOFOL 170 MG: 10 INJECTION, EMULSION INTRAVENOUS at 12:16

## 2024-01-05 RX ADMIN — ONDANSETRON 4 MG: 2 INJECTION INTRAMUSCULAR; INTRAVENOUS at 01:06

## 2024-01-05 RX ADMIN — FAMOTIDINE 40 MG: 20 TABLET, FILM COATED ORAL at 10:04

## 2024-01-05 RX ADMIN — IOHEXOL 85 ML: 350 INJECTION, SOLUTION INTRAVENOUS at 04:39

## 2024-01-05 RX ADMIN — NIMODIPINE 60 MG: 30 CAPSULE, LIQUID FILLED ORAL at 16:50

## 2024-01-05 RX ADMIN — NICARDIPINE HYDROCHLORIDE 13 MG/HR: 2.5 INJECTION, SOLUTION INTRAVENOUS at 12:11

## 2024-01-05 RX ADMIN — DESMOPRESSIN ACETATE 28.4 MCG: 4 INJECTION, SOLUTION INTRAVENOUS; SUBCUTANEOUS at 04:18

## 2024-01-05 RX ADMIN — DEXAMETHASONE SODIUM PHOSPHATE 10 MG: 10 INJECTION, SOLUTION INTRAMUSCULAR; INTRAVENOUS at 12:16

## 2024-01-05 RX ADMIN — NICARDIPINE HYDROCHLORIDE 15 MG/HR: 2.5 INJECTION, SOLUTION INTRAVENOUS at 07:24

## 2024-01-05 RX ADMIN — IODIXANOL 35 ML: 320 INJECTION, SOLUTION INTRAVASCULAR at 13:31

## 2024-01-05 RX ADMIN — HYDROMORPHONE HYDROCHLORIDE 0.2 MG: 0.2 INJECTION, SOLUTION INTRAMUSCULAR; INTRAVENOUS; SUBCUTANEOUS at 07:59

## 2024-01-05 RX ADMIN — ONDANSETRON 4 MG: 2 INJECTION INTRAMUSCULAR; INTRAVENOUS at 19:36

## 2024-01-05 RX ADMIN — NIMODIPINE 60 MG: 30 CAPSULE, LIQUID FILLED ORAL at 20:18

## 2024-01-05 RX ADMIN — LISINOPRIL 80 MG: 20 TABLET ORAL at 10:01

## 2024-01-05 RX ADMIN — HYDRALAZINE HYDROCHLORIDE 25 MG: 25 TABLET, FILM COATED ORAL at 21:38

## 2024-01-05 RX ADMIN — SUGAMMADEX 200 MG: 100 INJECTION, SOLUTION INTRAVENOUS at 13:19

## 2024-01-05 RX ADMIN — HYDRALAZINE HYDROCHLORIDE 25 MG: 25 TABLET, FILM COATED ORAL at 16:35

## 2024-01-05 RX ADMIN — SODIUM CHLORIDE: 0.9 INJECTION, SOLUTION INTRAVENOUS at 12:11

## 2024-01-05 RX ADMIN — LIDOCAINE HYDROCHLORIDE 50 MG: 10 INJECTION, SOLUTION EPIDURAL; INFILTRATION; INTRACAUDAL; PERINEURAL at 12:16

## 2024-01-05 RX ADMIN — NICARDIPINE HYDROCHLORIDE 2.5 MG/HR: 2.5 INJECTION, SOLUTION INTRAVENOUS at 13:59

## 2024-01-05 RX ADMIN — HYDRALAZINE HYDROCHLORIDE 10 MG: 20 INJECTION, SOLUTION INTRAMUSCULAR; INTRAVENOUS at 06:04

## 2024-01-05 RX ADMIN — NIMODIPINE 60 MG: 30 CAPSULE, LIQUID FILLED ORAL at 14:01

## 2024-01-05 RX ADMIN — CLONIDINE 0.3 MG: 0.3 PATCH, EXTENDED RELEASE TRANSDERMAL at 06:08

## 2024-01-05 RX ADMIN — ONDANSETRON 4 MG: 2 INJECTION INTRAMUSCULAR; INTRAVENOUS at 12:16

## 2024-01-05 RX ADMIN — NICARDIPINE HYDROCHLORIDE 15 MG/HR: 2.5 INJECTION, SOLUTION INTRAVENOUS at 19:57

## 2024-01-05 RX ADMIN — NICARDIPINE HYDROCHLORIDE 15 MG/HR: 2.5 INJECTION, SOLUTION INTRAVENOUS at 11:21

## 2024-01-05 RX ADMIN — ACETAMINOPHEN 975 MG: 325 TABLET, FILM COATED ORAL at 17:45

## 2024-01-05 RX ADMIN — METOCLOPRAMIDE HYDROCHLORIDE 5 MG: 5 INJECTION INTRAMUSCULAR; INTRAVENOUS at 05:28

## 2024-01-05 RX ADMIN — NICARDIPINE HYDROCHLORIDE: 2.5 INJECTION, SOLUTION INTRAVENOUS at 18:17

## 2024-01-05 RX ADMIN — ROCURONIUM BROMIDE 70 MG: 10 INJECTION, SOLUTION INTRAVENOUS at 12:16

## 2024-01-05 RX ADMIN — LABETALOL HYDROCHLORIDE 20 MG: 5 INJECTION, SOLUTION INTRAVENOUS at 02:18

## 2024-01-05 RX ADMIN — ONDANSETRON 4 MG: 2 INJECTION INTRAMUSCULAR; INTRAVENOUS at 15:42

## 2024-01-05 NOTE — ED NOTES
ESTHER Pickup at 0430    Primary nurse and care team updated     Mary Quinonez RN  01/05/24 8129

## 2024-01-05 NOTE — CONSULTS
Consultation - Nephrology   Mateus CLOTILDE Mckeon 38 y.o. male MRN: [id number]  Unit/Bed#: ICU 03 Encounter: [id number]    ASSESSMENT & PLAN    38-year-old male with a history of end-stage kidney disease on hemodialysis presented with a headache and was found to have a subarachnoid hemorrhage    End-stage kidney disease on hemodialysis at [facility] [place] [facility] Monday Wednesday Friday secondary to diabetic nephropathy  Started dialysis in 2018  Left upper extremity AV fistula  Was on PD in the past and was changed to hemodialysis in December 2020  137 sodium 35 bicarb 2 potassium 2.5 calcium 1 magnesium 100 dextrose with a 37 degree dialysate temperature a blood flow rate of 450 and a prescribed treatment time of 4 hours with an estimated dry weight of 93 kg    Subarachnoid hemorrhage  Complaining of severe headaches and now in the ICU after CT scan showed a subarachnoid hemorrhage from the ER in [place]  Ongoing management per ICU team    Hypertension  History of hypertensive emergency and hypertensive urgency in the past  Outpatient records reviewed BP lowering medications include  Lisinopril 40 mg daily  Labetalol 200 mg 2 times a day  Nifedipine 90 mg a day  Clonidine 0.3 mg patch weekly  Hydralazine times a day  With current blood pressure is adequate  Admitted dry weight of 93 kg    Type 2 diabetes in the setting of end-stage kidney disease  Moderate glycemic control while on dialysis    Secondary hyperparathyroidism  On Cinacalcet 60 mg daily, will will placed on 120 mg here but given his nausea and vomiting with decrease the dose and this is his outpatient dose as of mid December    Hyperphosphatemia  On Velphoro as an outpatient  On calcium acetate here    History of hyperkalemia  On Veltassa 5 mg on nondialysis days    Discussed with ICU team and we were in agreement with hemodialysis today in the afternoon he is getting angiogram in the morning    HISTORY OF PRESENT ILLNESS:  Requesting Physician: Andressa Marcos  MD  Reason for Consult: End-stage kidney disease on hemodialysis     Mateus Mckeon is a 38 y.o. year old male who was admitted for headaches and a subarachnoid hemorrhage he has a history of hypertension as well as a stroke on aspirin who presented on January 5 with severe headaches for 5 days he had undergone HD he felt was excess fluid that was taken off and he had some nausea and vomiting he had a persistent headache from that time and was found to have a right basilar cistern subarachnoid hemorrhage and he was evaluated and transferred to [facility] for further evaluation by neurosurgery.  He is getting a formal angiogram today this morning and we will plan on hemodialysis this afternoon    PAST MEDICAL HISTORY:  Past Medical History:   Diagnosis Date    Acute kidney injury (HCC)     Ambulates with cane     Anuria     Anxiety     Cellulitis of right elbow 03/31/2021    Chronic kidney disease     Depression     Diabetes mellitus (HCC)     Diarrhea     Emesis 10/24/2020    End stage renal disease (HCC) 02/11/2018    Formatting of this note might be different from the original. Last Assessment & Plan:  Secondary to DM.  On nightly PD.  Followed by Nephro.  [facility] Formatting of this note might be different from the original. Last Assessment & Plan:  Formatting of this note might be different from the original. 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    Esophagitis 07/21/2015    Falls     Gastroparesis     GERD (gastroesophageal reflux disease)     History of shingles 2010    History of transfusion 02/2018    no adverse reaction    Hyperlipidemia     Hyperphosphatemia     Hypertension     Hypoglycemia 07/15/2022    Itching     Mastoiditis of right side 07/15/2022    Muscle weakness     general unsteadiness    Obesity (BMI 30.0-34.9) 09/09/2019    Orthostatic hypotension 10/25/2020    Peripheral polyneuropathy  11/20/2019    PONV (postoperative nausea and vomiting) 01/26/2018    Protein-calorie malnutrition (HCC) 11/23/2020    Recurrent peritonitis (HCC) due to peritoneal dialysis catheter 07/31/2020    Retinopathy     Seizures (HCC)     early 2020 - one time    Skin abnormality     some dime size areas where skin was scratched from itching    Spontaneous bacterial peritonitis (HCC) 10/19/2020    Squamous cell skin cancer     left temple    Stroke (HCC)     x2 - off balance/no driving/fatigue    Swelling of both lower extremities     Traumatic onycholysis 07/21/2022    Vomiting     Wears glasses        PAST SURGICAL HISTORY:  Past Surgical History:   Procedure Laterality Date    CARDIAC ELECTROPHYSIOLOGY PROCEDURE N/A 9/21/2023    Procedure: Cardiac loop recorder explant;  Surgeon: Parish Morgan MD;  Location: [facility];  Service: Cardiology    CARDIAC LOOP RECORDER  05/2018    COLONOSCOPY      EGD      EYE SURGERY Right     IR AV FISTULAGRAM/GRAFTOGRAM  02/23/2021    IR TUNNELED CENTRAL LINE PLACEMENT  02/16/2021    IR TUNNELED DIALYSIS CATHETER PLACEMENT  11/18/2020    IR TUNNELED DIALYSIS CATHETER REMOVAL  02/12/2021    IR TUNNELED DIALYSIS CATHETER REMOVAL  03/11/2021    MOHS SURGERY Left 12/14/2022    Left temple with Dr. Hassan    PERITONEAL CATHETER INSERTION N/A 08/27/2018    Procedure: UNROOF PD CATHETER;  Surgeon: Felipe Lindo DO;  Location: [facility];  Service: General    DE ARTERIOVENOUS ANASTOMOSIS OPEN DIRECT Left 11/09/2020    Procedure: CREATION FISTULA  ARTERIOVENOUS (AV) - LEFT WRIST;  Surgeon: Placido Altamirano MD;  Location: [facility];  Service: Vascular    DE ESOPHAGOGASTRODUODENOSCOPY TRANSORAL DIAGNOSTIC N/A 04/18/2019    Procedure: ESOPHAGOGASTRODUODENOSCOPY (EGD);  Surgeon: Ale Figueroa MD;  Location: [facility] GI [facility];  Service: Gastroenterology    DE LAPS INSERTION TUNNELED INTRAPERITONEAL CATHETER N/A 08/06/2018    Procedure: LAPAROSCOPIC PD CATHETER PLACEMENT;  Surgeon: Felipe Lindo  DO;  Location: [facility];  Service: General    MA REMOVAL TUNNELED INTRAPERITONEAL CATHETER N/A 2020    Procedure: REMOVAL CATHETER PERITONEAL DIALYSIS;  Surgeon: Abdifatah Ty MD;  Location: [facility];  Service: General    TONSILLECTOMY      UPPER GASTROINTESTINAL ENDOSCOPY         ALLERGIES:  Allergies   Allergen Reactions    Sulfa Antibiotics Rash       SOCIAL HISTORY:  Social History     Substance and Sexual Activity   Alcohol Use Not Currently     Social History     Substance and Sexual Activity   Drug Use Yes    Types: Marijuana    Comment: medical marijuana     Social History     Tobacco Use   Smoking Status Former    Current packs/day: 0.00    Average packs/day: 0.5 packs/day for 12.0 years (6.0 ttl pk-yrs)    Types: Cigarettes    Start date: 2006    Quit date: 2018    Years since quittin.9   Smokeless Tobacco Never   Tobacco Comments    quit 2018       FAMILY HISTORY:  Family History   Problem Relation Age of Onset    Breast cancer Mother     Hypertension Mother     Hyperlipidemia Father     Hypertension Father     Leukemia Maternal Grandmother     Hyperlipidemia Maternal Grandfather     Hypertension Maternal Grandfather     Hyperlipidemia Paternal Grandmother     Hypertension Paternal Grandmother     Heart disease Paternal Grandfather         cardiac disorder    Diabetes Paternal Grandfather        MEDICATIONS:    Current Facility-Administered Medications:     acetaminophen (TYLENOL) tablet 975 mg, 975 mg, Oral, Q6H [facility], Radha Steen MD    atorvastatin (LIPITOR) tablet 40 mg, 40 mg, Oral, QPM, Juan Woodruff MD    bisacodyl (DULCOLAX) rectal suppository 10 mg, 10 mg, Rectal, Daily PRN, DEISI Tony    calcium acetate (PHOSLO) capsule 667 mg, 667 mg, Oral, TID With Meals, Juan Woodruff MD    chlorhexidine (PERIDEX) 0.12 % oral rinse 15 mL, 15 mL, Mouth/Throat, Q12H HALIE, DEISI Tony, 15 mL at 24 0814    cinacalcet (SENSIPAR) tablet 120 mg, 120 mg, Oral, Daily, Juan Woodruff  MD    cloNIDine (CATAPRES-TTS-3) 0.3 mg/24 hr TD weekly patch, 0.3 mg, Transdermal, Weekly, Andressa Marcos MD, 0.3 mg at 01/05/24 0608    escitalopram (LEXAPRO) tablet 20 mg, 20 mg, Oral, Daily, Juan Woodruff MD    famotidine (PEPCID) tablet 40 mg, 40 mg, Oral, Daily, Juan Woodruff MD, 40 mg at 01/05/24 1004    gabapentin (NEURONTIN) capsule 100 mg, 100 mg, Oral, HS, Juan Woodruff MD    hydrALAZINE (APRESOLINE) injection 10 mg, 10 mg, Intravenous, Q6H HALIE, Andressa Marcos MD, 10 mg at 01/05/24 0604    hydrALAZINE (APRESOLINE) tablet 25 mg, 25 mg, Oral, TID, Juan Woodruff MD, 25 mg at 01/05/24 1004    HYDROmorphone HCl (DILAUDID) injection 0.2 mg, 0.2 mg, Intravenous, Q6H PRN, DEISI Tony, 0.2 mg at 01/05/24 0759    insulin lispro (HumaLOG) 100 units/mL subcutaneous injection 1-6 Units, 1-6 Units, Subcutaneous, Q6H HALIE **AND** Fingerstick Glucose (POCT), , , Q6H, DEISI Tony    insulin lispro (HumaLOG) 100 units/mL subcutaneous injection 1-6 Units, 1-6 Units, Subcutaneous, HS, DEISI Tony    labetalol (NORMODYNE) tablet 200 mg, 200 mg, Oral, TID, Juan Woodruff MD, 200 mg at 01/05/24 1001    lisinopril (ZESTRIL) tablet 80 mg, 80 mg, Oral, Daily, Juan Woodruff MD, 80 mg at 01/05/24 1001    metoclopramide (REGLAN) injection 5 mg, 5 mg, Intravenous, Q6H PRN, Radha Steen MD    niCARdipine (CARDENE) 2.5 mg/mL injection **ADS Override Pull**, , , ,     niCARdipine (CARDENE) 50 mg (DOUBLE CONCENTRATION) IV in sodium chloride 0.9% 250 mL, 1-15 mg/hr, Intravenous, Titrated, Andressa Marcos MD, Last Rate: 75 mL/hr at 01/05/24 1121, 15 mg/hr at 01/05/24 1121    NIFEdipine (PROCARDIA XL) 24 hr tablet 90 mg, 90 mg, Oral, HS, Juan Woodruff MD    niMODipine (NIMOTOP) capsule 60 mg, 60 mg, Oral, Q4H HALIE, Andressa Marcos MD    ondansetron (ZOFRAN) injection 4 mg, 4 mg, Intravenous, Q4H, Radha Steen MD    REVIEW OF SYSTEMS:    A 12 point review of systems was performed and was negative besides  "what is mentioned in HPI    OBJECTIVE:    Vitals:    01/05/24 0815 01/05/24 0915 01/05/24 1015 01/05/24 1115   BP: 152/62 112/66 136/68 151/67   BP Location:       Pulse: 86 84 84 88   Resp: 22 18 20 18   Temp:  99.1 °F (37.3 °C)     TempSrc:  Oral     SpO2: 98% 97% 97% 99%   Weight:       Height:            Temp:  [97.8 °F (36.6 °C)-99.1 °F (37.3 °C)] 99.1 °F (37.3 °C)  HR:  [61-88] 88  Resp:  [12-23] 18  BP: (112-247)/() 151/67  SpO2:  [97 %-100 %] 99 %   Body mass index is 34.95 kg/m².    I/O last 3 completed shifts:  In: 150 [I.V.:150]  Out: 0   I/O this shift:  In: 310 [I.V.:310]  Out: 0     Weight (last 2 days)       Date/Time Weight    01/05/24 0731 95.3 (210)    01/05/24 0615 95.3 (210.1)             Physical exam:    General: no acute distress, cooperative  Eyes: conjunctivae pink, anicteric sclerae  ENT: lips and mucous membranes moist, no exudates, normal external ears  Neck: ROM intact, no JVD  Chest: No respiratory distress, no accessory muscle use  CVS: normal rate, non pericardial friction rub  Abdomen: soft, non-tender, non-distended, normoactive bowel sounds  Extremities: no edema of both legs  Skin: no rash  Neuro: awake, alert, oriented, grossly intact  Psych:  Pleasant affect      Invasive Devices:      Lab Results:   Results from last 7 days   Lab Units 01/05/24  0548 01/04/24  2243 01/03/24  0934   WBC Thousand/uL  --  8.46 7.16   HEMOGLOBIN g/dL  --  11.5* 10.7*   HEMATOCRIT %  --  34.3* 32.1*   PLATELETS Thousands/uL  --  210 215   POTASSIUM mmol/L 4.7 5.3 4.5   CHLORIDE mmol/L  --  91* 93*   CO2 mmol/L  --  27 34*   BUN mg/dL  --  56* 36*   CREATININE mg/dL  --  13.49* 9.31*   CALCIUM mg/dL  --  9.0 8.4   ALK PHOS U/L  --  79 74   ALT U/L  --  10 10   AST U/L  --  15 15         Portions of the record may have been created with voice recognition software. Occasional wrong word or \"sound a like\" substitutions may have occurred due to the inherent limitations of voice recognition " software. Read the chart carefully and recognize, using context, where substitutions have occurred.If you have any questions, please contact the dictating provider.

## 2024-01-05 NOTE — H&P
Leila 39yo with ESRD, HTN, old stroke(2015/2018) on ASA presents to ED [facility] with severe headache since last Sunday. Associated nausea and vomiting. Seen in same ED on Wednesday and sent home. Neuroimaging showed SAH in right basilar cisterns    Visit Vitals  Smoking Status Former            On exam,  In general:  No acute distress.  Prefers to keep eyes closed  HEENT:  PERRL.  Conjunctiva normal.  No scleral icterus.  Mucus membranes moist.  Chest:  CTA  Cardiovascular:  S1S2  Abdomen:  Soft, nontender, nondistended.  Normal bowel sounds.  No palpable masses or organomegaly.   Extremities:  Good distal pulses.  No edema.  No rashes. Left AV fistula with thrill  Neuro:  awake, conversant, prefers to keep eyes closed, good strength all extremities    Lab Results   Component Value Date    WBC 8.46 01/04/2024    HGB 11.5 (L) 01/04/2024    HCT 34.3 (L) 01/04/2024    MCV 94 01/04/2024     01/04/2024      Lab Results   Component Value Date     10/28/2015    SODIUM 135 01/04/2024    K 5.3 01/04/2024    CL 91 (L) 01/04/2024    CO2 27 01/04/2024    ANIONGAP 9 10/28/2015    AGAP 17 01/04/2024    BUN 56 (H) 01/04/2024    CREATININE 13.49 (H) 01/04/2024    GLUC 193 (H) 01/04/2024    GLUF 191 (H) 12/23/2021    CALCIUM 9.0 01/04/2024    AST 15 01/04/2024    ALT 10 01/04/2024    ALKPHOS 79 01/04/2024    PROT 7.3 10/28/2015    TP 6.3 (L) 01/04/2024    BILITOT 0.49 10/28/2015    TBILI 0.62 01/04/2024    EGFR 4 01/04/2024        Labs show Na- 135, k-5.3, Cr-13.4, Glucose-193, hb-11.5      Imaging:  CTA- 1.  Small amount of subarachnoid hemorrhage in the right basilar cistern region is again seen without significant change since the prior CT brain exam of the same day. No enhancing brain mass/fluid collection or evidence of vascular malformation. Major   intracranial dural venous sinuses are grossly patent.  2.  Mild scattered calcific atherosclerosis of the carotid bifurcation and cavernous carotid segments  bilaterally without significant stenosis. Bilateral major cervical/intracranial arteries are patent with normal course and caliber. No arterial   occlusion, significant flow-limiting stenosis, or focal saccular aneurysm identified  3.  Nonspecific 4 mm right upper lobe lung nodule is seen. No routine follow-up imaging recommended per current Fleischner Society guidelines for low risk patient.  4.  Opacification of the right mastoid air cells could be secondary to mastoid effusion or mastoiditis, recommend clinical correlation with ENT exam findings. Left mastoid air cells and paranasal sinuses appear well aerated and clear.    CT head- 1.  Right prepontine/premedullary cistern subarachnoid hemorrhage. Minimal mass effect on the right middle cerebellar peduncle. Short-term follow-up is recommended.  2.  Opacification of the right mastoid air cells correlate clinically for mastoiditis.      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  RACHEAL  Hx of gastroparesis    Plan:    Critically ill with potential for further neurologic deterioration- admit neuroICU  On nicardipine gtt to keep SBP< 140  Close neuro monitoring  For cerebral angiogram this am- d/w Dr Mittal  Opted on not placing arterial catheter for hemodynamic monitoring to leave right wrist for angio- he does have AV fistula on left  Will treat as aneurysmal SAH despite negative CTA until angio done  Nimodipine  Daily TCD  Can hold on keppra for now  Given one dose DDAVP in outside hospital-in setting of ASA use and uremic platelet dysfxn  Anti-emetics/pain control  On nicardipine gtt  Restart outpt clonidine patch  Iv hydralazine RTC until able to take oral  On multiple  outpt BP meds reflecting underlying poorly controlled HTN  Monitor blood glucose  Low threshold to place on insulin gtt  Continue outpt statin  Neurontin when taking oral  HD since 2018- M/W/F  DVT prophylaxis    Consult neurosurgery, nephrology for HD    Code status:  FULL  Disposition:  ICU       Critical care time- 55mins

## 2024-01-05 NOTE — PHYSICAL THERAPY NOTE
Physical Therapy Cancellation Note    PT orders received chart review completed. Pt is currently in IR and not appropriate to participate in skilled PT at this time. PT will follow and eval as medically appropriate.     01/05/24 1000   Note Type   Note type Evaluation;Cancelled Session   Cancel Reasons Patient off floor/test   Cognition   Orientation Level Oriented X4       Rere Berger, PT

## 2024-01-05 NOTE — OCCUPATIONAL THERAPY NOTE
Occupational Therapy         Patient Name: Mateus Mckeon  Today's Date: 1/5/2024 01/05/24 1130   OT Last Visit   OT Visit Date 01/05/24   Note Type   Note type Cancelled Session   Cancel Reasons Patient off floor/test   Additional Comments At IR   Cognition   Orientation Level Oriented X4       Nichole Mendoza

## 2024-01-05 NOTE — CONSULTS
[facility]  Consult  Name: Mateus Mckeon 38 y.o. male I MRN: [id number]  Unit/Bed#: ICU 03 I Date of Admission: 1/5/2024   Date of Service: 1/5/2024 I Hospital Day: 0    Inpatient consult to Neurosurgery  Consult performed by: DEISI Sutton  Consult ordered by: DEISI Tony          Assessment/Plan   * Subarachnoid hemorrhage (HCC)  Assessment & Plan  Right basilar cistern SAH  BD 6, HH 2, MF 3.  P/w HA since last Sunday.  Hx ASA use for hx multiple strokes, reversed with DDAVP.  On current exam, c/o headache, otherwise non-focal. GCS 15.     Imaging:  CTA head and neck w/wo, 1/5/2024: 1.  Small amount of subarachnoid hemorrhage in the right basilar cistern region is again seen without significant change since the prior CT brain exam of the same day. No enhancing brain mass/fluid collection or evidence of vascular malformation. Major intracranial dural venous sinuses are grossly patent.2.  Mild scattered calcific atherosclerosis of the carotid bifurcation and cavernous carotid segments bilaterally without significant stenosis. Bilateral major cervical/intracranial arteries are patent with normal course and caliber. No arterial occlusion, significant flow-limiting stenosis, or focal saccular aneurysm identified3.  Nonspecific 4 mm right upper lobe lung nodule is seen. No routine follow-up imaging recommended per current Fleischner Society guidelines for low risk patient.  CT head wo, 1/5/2024: 1.  Right prepontine/premedullary cistern subarachnoid hemorrhage. Minimal mass effect on the right middle cerebellar peduncle. Short-term follow-up is recommended. 2.  Opacification of the right mastoid air cells correlate clinically for mastoiditis.    Plan:  Continue to monitor neuro exam closely.  STAT CTA with decline in GCS > 2 pts in 1 hour.  Plan for formal angiogram this am 1/5 with Dr. Mittal.  Maintain SBP < 160 mmHg.  Will maintain on SAH pathway until  proven otherwise:  Spasm watch  Daily TCDs: R 0.61, L 1.43  Continue Nimodipine 60mg Q4H  Normonatremia (today 135), euvolemia (+150/24H), Hgb >8 (today 11.5)   SBP<160 mmHg  DVT PPX: SCDs.  Mobilize as tolerated.  DVT ppx: SCD's, hold pharmacologic DVT ppx.    Neurosurgery will continue to follow. Call with questions.           History of Present Illness     HPI: Mateus Mckeon is a 38 y.o. male with PMH including HTN, ESRD on dialysis (M,W,F), hx stroke on ASA, who presented to the [facility] ED on 1/5/2024 with severe headache x 5 days. He related that he had undergone HD on Sunday 12/31 and was very dehydrated afterwards with associated N/V (went Sunday instead of Monday due to holiday). He vomited and then developed headache. His HA persisted and after dialysis on Wednesday, 1/3 he was sent from there to ED for evaluation due to concerns for HA and hypertension. He was discharged home. Headache continued to persist and he returned to the ED on 1/4. Imaging completed indicating acute right basilar cistern subarachnoid hemorrhage. He was therefore transferred to [facility] for further evaluation and treatment.    Currently, he continues to endorse severe headache, neck pain (meningismus) and N/V. He denies any weakness or visual disturbance. He is accompanied by his parents.     Review of Systems   Constitutional: Negative.  Negative for activity change, appetite change and fatigue.   HENT:  Negative for ear pain, hearing loss, nosebleeds, postnasal drip, tinnitus, trouble swallowing and voice change.    Eyes:  Negative for pain and visual disturbance.   Respiratory:  Negative for chest tightness and shortness of breath.    Cardiovascular:  Negative for chest pain, palpitations and leg swelling.   Gastrointestinal:  Positive for nausea and vomiting. Negative for abdominal pain and diarrhea.   Musculoskeletal:  Positive for neck pain. Negative for back pain and neck stiffness.   Skin:  Negative for  color change and pallor.   Neurological:  Positive for headaches. Negative for dizziness, tremors, seizures, syncope, facial asymmetry, speech difficulty, weakness, light-headedness and numbness.   Psychiatric/Behavioral:  Negative for agitation, behavioral problems and confusion.        Historical Information   Past Medical History:   Diagnosis Date    Acute kidney injury (HCC)     Ambulates with cane     Anuria     Anxiety     Cellulitis of right elbow 03/31/2021    Chronic kidney disease     Depression     Diabetes mellitus (HCC)     Diarrhea     Emesis 10/24/2020    End stage renal disease (HCC) 02/11/2018    Formatting of this note might be different from the original. Last Assessment & Plan:  Secondary to DM.  On nightly PD.  Followed by Nephro.  Patient considering transplant for kidney and pancreas through [facility] Formatting of this note might be different from the original. Last Assessment & Plan:  Formatting of this note might be different from the original. 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    Esophagitis 07/21/2015    Falls     Gastroparesis     GERD (gastroesophageal reflux disease)     History of shingles 2010    History of transfusion 02/2018    no adverse reaction    Hyperlipidemia     Hyperphosphatemia     Hypertension     Hypoglycemia 07/15/2022    Itching     Mastoiditis of right side 07/15/2022    Muscle weakness     general unsteadiness    Obesity (BMI 30.0-34.9) 09/09/2019    Orthostatic hypotension 10/25/2020    Peripheral polyneuropathy 11/20/2019    PONV (postoperative nausea and vomiting) 01/26/2018    Protein-calorie malnutrition (HCC) 11/23/2020    Recurrent peritonitis (HCC) due to peritoneal dialysis catheter 07/31/2020    Retinopathy     Seizures (HCC)     early 2020 - one time    Skin abnormality     some dime size areas where skin was scratched from itching    Spontaneous bacterial peritonitis (HCC) 10/19/2020    Squamous cell skin cancer      left temple    Stroke (HCC)     x2 - off balance/no driving/fatigue    Swelling of both lower extremities     Traumatic onycholysis 07/21/2022    Vomiting     Wears glasses      Past Surgical History:   Procedure Laterality Date    CARDIAC ELECTROPHYSIOLOGY PROCEDURE N/A 9/21/2023    Procedure: Cardiac loop recorder explant;  Surgeon: Parish Morgan MD;  Location: [facility];  Service: Cardiology    CARDIAC LOOP RECORDER  05/2018    COLONOSCOPY      EGD      EYE SURGERY Right     IR AV FISTULAGRAM/GRAFTOGRAM  02/23/2021    IR TUNNELED CENTRAL LINE PLACEMENT  02/16/2021    IR TUNNELED DIALYSIS CATHETER PLACEMENT  11/18/2020    IR TUNNELED DIALYSIS CATHETER REMOVAL  02/12/2021    IR TUNNELED DIALYSIS CATHETER REMOVAL  03/11/2021    MOHS SURGERY Left 12/14/2022    Left temple with Dr. Hassan    PERITONEAL CATHETER INSERTION N/A 08/27/2018    Procedure: UNROOF PD CATHETER;  Surgeon: Felipe Lindo DO;  Location: [facility];  Service: General    NC ARTERIOVENOUS ANASTOMOSIS OPEN DIRECT Left 11/09/2020    Procedure: CREATION FISTULA  ARTERIOVENOUS (AV) - LEFT WRIST;  Surgeon: Placido Altamirano MD;  Location: [facility];  Service: Vascular    NC ESOPHAGOGASTRODUODENOSCOPY TRANSORAL DIAGNOSTIC N/A 04/18/2019    Procedure: ESOPHAGOGASTRODUODENOSCOPY (EGD);  Surgeon: Ale Figueroa MD;  Location: [facility];  Service: Gastroenterology    NC LAPS INSERTION TUNNELED INTRAPERITONEAL CATHETER N/A 08/06/2018    Procedure: LAPAROSCOPIC PD CATHETER PLACEMENT;  Surgeon: Felipe Lindo DO;  Location: [facility];  Service: General    NC REMOVAL TUNNELED INTRAPERITONEAL CATHETER N/A 11/18/2020    Procedure: REMOVAL CATHETER PERITONEAL DIALYSIS;  Surgeon: Abdifatah Ty MD;  Location: [facility];  Service: General    TONSILLECTOMY      UPPER GASTROINTESTINAL ENDOSCOPY       Social History     Substance and Sexual Activity   Alcohol Use Not Currently     Social History     Substance and Sexual Activity   Drug Use Yes    Types:  Marijuana    Comment: medical marijuana     Social History     Tobacco Use   Smoking Status Former    Current packs/day: 0.00    Average packs/day: 0.5 packs/day for 12.0 years (6.0 ttl pk-yrs)    Types: Cigarettes    Start date: 2006    Quit date: 2018    Years since quittin.9   Smokeless Tobacco Never   Tobacco Comments    quit 2018     Family History   Problem Relation Age of Onset    Breast cancer Mother     Hypertension Mother     Hyperlipidemia Father     Hypertension Father     Leukemia Maternal Grandmother     Hyperlipidemia Maternal Grandfather     Hypertension Maternal Grandfather     Hyperlipidemia Paternal Grandmother     Hypertension Paternal Grandmother     Heart disease Paternal Grandfather         cardiac disorder    Diabetes Paternal Grandfather        Meds/Allergies   all current active meds have been reviewed and current meds:   Current Facility-Administered Medications   Medication Dose Route Frequency    acetaminophen (TYLENOL) tablet 975 mg  975 mg Oral Q6H [facility]    atorvastatin (LIPITOR) tablet 40 mg  40 mg Oral QPM    bisacodyl (DULCOLAX) rectal suppository 10 mg  10 mg Rectal Daily PRN    calcium acetate (PHOSLO) capsule 667 mg  667 mg Oral TID With Meals    chlorhexidine (PERIDEX) 0.12 % oral rinse 15 mL  15 mL Mouth/Throat Q12H [facility]    cinacalcet (SENSIPAR) tablet 120 mg  120 mg Oral Daily    cloNIDine (CATAPRES-TTS-3) 0.3 mg/24 hr TD weekly patch  0.3 mg Transdermal Weekly    escitalopram (LEXAPRO) tablet 20 mg  20 mg Oral Daily    famotidine (PEPCID) tablet 40 mg  40 mg Oral Daily    gabapentin (NEURONTIN) capsule 100 mg  100 mg Oral HS    hydrALAZINE (APRESOLINE) injection 10 mg  10 mg Intravenous Q6H [facility]    hydrALAZINE (APRESOLINE) tablet 25 mg  25 mg Oral TID    HYDROmorphone HCl (DILAUDID) injection 0.2 mg  0.2 mg Intravenous Q6H PRN    insulin lispro (HumaLOG) 100 units/mL subcutaneous injection 1-6 Units  1-6 Units Subcutaneous Q6H [facility]    insulin lispro (HumaLOG) 100  units/mL subcutaneous injection 1-6 Units  1-6 Units Subcutaneous HS    labetalol (NORMODYNE) tablet 200 mg  200 mg Oral TID    lisinopril (ZESTRIL) tablet 80 mg  80 mg Oral Daily    metoclopramide (REGLAN) injection 5 mg  5 mg Intravenous Q6H PRN    niCARdipine (CARDENE) 2.5 mg/mL injection **ADS Override Pull**        niCARdipine (CARDENE) 50 mg (DOUBLE CONCENTRATION) IV in sodium chloride 0.9% 250 mL  1-15 mg/hr Intravenous Titrated    NIFEdipine (PROCARDIA XL) 24 hr tablet 90 mg  90 mg Oral HS    niMODipine (NIMOTOP) capsule 60 mg  60 mg Oral Q4H [facility]    ondansetron (ZOFRAN) injection 4 mg  4 mg Intravenous Q4H     Allergies   Allergen Reactions    Sulfa Antibiotics Rash       Objective   I/O         01/03 0701  01/04 0700 01/04 0701 01/05 0700 01/05 0701 01/06 0700    I.V. (mL/kg)  150 (1.6) 160 (1.7)    Total Intake(mL/kg)  150 (1.6) 160 (1.7)    Urine (mL/kg/hr)  0 0 (0)    Total Output  0 0    Net  +150 +160                   Physical Exam  Constitutional:       General: He is not in acute distress.     Appearance: He is well-developed. He is not diaphoretic.   Eyes:      General:         Right eye: No discharge.         Left eye: No discharge.      Extraocular Movements: EOM normal.      Conjunctiva/sclera: Conjunctivae normal.      Pupils: Pupils are equal, round, and reactive to light.   Pulmonary:      Effort: Pulmonary effort is normal. No respiratory distress.   Abdominal:      General: There is no distension.      Tenderness: There is no abdominal tenderness.   Musculoskeletal:         General: Normal range of motion.      Cervical back: Normal range of motion and neck supple.   Skin:     General: Skin is warm and dry.   Neurological:      Mental Status: He is alert and oriented to person, place, and time.      Cranial Nerves: No cranial nerve deficit.      Coordination: Finger-Nose-Finger Test normal.      Deep Tendon Reflexes: Reflexes normal.   Psychiatric:         Speech: Speech normal.          Behavior: Behavior normal.         Thought Content: Thought content normal.         Judgment: Judgment normal.       Neurologic Exam     Mental Status   Oriented to person, place, and time.   Oriented to person.   Oriented to place.   Oriented to time. Oriented to year, month and date.   Attention: normal. Concentration: normal.   Speech: speech is normal   Level of consciousness: alert    Cranial Nerves     CN III, IV, VI   Pupils are equal, round, and reactive to light.  Extraocular motions are normal.   Right pupil: Size: 3 mm. Shape: regular. Reactivity: brisk. Consensual response: intact. Accommodation: intact.   Left pupil: Size: 3 mm. Shape: regular. Reactivity: brisk. Consensual response: intact. Accommodation: intact.   Nystagmus: none   Diplopia: none  Conjugate gaze: present    CN V   Right facial sensation deficit: none  Left facial sensation deficit: none    CN VII   Facial expression full, symmetric.     CN VIII   Hearing: intact    CN IX, X   Palate: symmetric    CN XI   Right sternocleidomastoid strength: normal  Left sternocleidomastoid strength: normal  Right trapezius strength: normal  Left trapezius strength: normal    CN XII   Tongue: not atrophic  Fasciculations: absent  Tongue deviation: none    Motor Exam   Muscle bulk: normal  Overall muscle tone: normal  Right arm pronator drift: absent  Left arm pronator drift: absent    Strength   Right deltoid: 5/5  Left deltoid: 5/5  Right biceps: 5/5  Left biceps: 5/5  Right triceps: 5/5  Left triceps: 5/5  Right quadriceps: 5/5  Left quadriceps: 5/5  Right hamstrin/5  Left hamstrin/5  Right anterior tibial: 5/5  Left anterior tibial: 5/5  Right posterior tibial: 5/5  Left posterior tibial: 5/5  Right peroneal: 5/5  Left peroneal: 5/5  Right gastroc: 5/5  Left gastroc: 5/5    Sensory Exam   Light touch normal.   Proprioception normal.     Gait, Coordination, and Reflexes     Coordination   Finger to nose coordination: normal    Tremor   Resting  "tremor: absent  Intention tremor: absent  Action tremor: absent      Vitals:Blood pressure 152/62, pulse 86, temperature 97.9 °F (36.6 °C), temperature source Oral, resp. rate 22, height 5' 5\" (1.651 m), weight 95.3 kg (210 lb), SpO2 98%.,Body mass index is 34.95 kg/m².     Lab Results:   Results from last 7 days   Lab Units 01/04/24 2243 01/03/24  0934   WBC Thousand/uL 8.46 7.16   HEMOGLOBIN g/dL 11.5* 10.7*   HEMATOCRIT % 34.3* 32.1*   PLATELETS Thousands/uL 210 215   NEUTROS PCT % 71 69   MONOS PCT % 7 6   EOS PCT % 4 7*     Results from last 7 days   Lab Units 01/05/24  0548 01/04/24 2243 01/03/24  0934   POTASSIUM mmol/L 4.7 5.3 4.5   CHLORIDE mmol/L  --  91* 93*   CO2 mmol/L  --  27 34*   BUN mg/dL  --  56* 36*   CREATININE mg/dL  --  13.49* 9.31*   CALCIUM mg/dL  --  9.0 8.4   ALK PHOS U/L  --  79 74   ALT U/L  --  10 10   AST U/L  --  15 15             Results from last 7 days   Lab Units 01/05/24  0614 01/05/24  0351   INR   --  1.04   PTT seconds 29 30     No results found for: \"TROPONINT\"  ABG:  Lab Results   Component Value Date    PHART 7.454 (H) 02/21/2020    XBT1ZLY 33.2 (L) 02/21/2020    PO2ART 170.0 (H) 02/21/2020    VZG1VLP 22.8 02/21/2020    BEART -0.8 02/21/2020    SOURCE Radial, Right 02/21/2020       Imaging Studies: I have personally reviewed pertinent reports.   and I have personally reviewed pertinent films in PACS    CTA head and neck with and without contrast    Result Date: 1/5/2024  Impression: 1.  Small amount of subarachnoid hemorrhage in the right basilar cistern region is again seen without significant change since the prior CT brain exam of the same day. No enhancing brain mass/fluid collection or evidence of vascular malformation. Major intracranial dural venous sinuses are grossly patent. 2.  Mild scattered calcific atherosclerosis of the carotid bifurcation and cavernous carotid segments bilaterally without significant stenosis. Bilateral major cervical/intracranial arteries " are patent with normal course and caliber. No arterial occlusion, significant flow-limiting stenosis, or focal saccular aneurysm identified 3.  Nonspecific 4 mm right upper lobe lung nodule is seen. No routine follow-up imaging recommended per current Fleischner Society guidelines for low risk patient. 4.  Opacification of the right mastoid air cells could be secondary to mastoid effusion or mastoiditis, recommend clinical correlation with ENT exam findings. Left mastoid air cells and paranasal sinuses appear well aerated and clear. Workstation performed: [id number]     CT head without contrast    Result Date: 1/5/2024  Impression: 1.  Right prepontine/premedullary cistern subarachnoid hemorrhage. Minimal mass effect on the right middle cerebellar peduncle. Short-term follow-up is recommended. 2.  Opacification of the right mastoid air cells correlate clinically for mastoiditis. I personally discussed this study with REYNALDO MULLER on 1/5/2024 3:43 AM. Workstation performed: [id number]       EKG, Pathology, and Other Studies: I have personally reviewed pertinent reports.   and I have personally reviewed pertinent films in PACS    VTE Prophylaxis: Sequential compression device (Venodyne)     Code Status: Level 1 - Full Code  Advance Directive and Living Will:      Power of :    POLST:      Counseling / Coordination of Care  I spent 20 minutes with the patient.

## 2024-01-05 NOTE — PLAN OF CARE
Pre-tx:  UF 2L as tolerated to EDW.  Pre-weight taken with leg immobilizer on, pt s/p cath lab procedure and unable to remove.  BP stable to start HD, pt continues on Nicardipine gtts.  Tx time 3 hrs today per Nephrology.  LLA AVF cannulated without issue.  Will continue to monitor.      Post-Dialysis RN Treatment Note    Blood Pressure:  Pre 147/70 mm/Hg  Post 168/74 mmHg   EDW  93 kg    Weight:  Pre 95.5 kg   Post 91.8 kg   Mode of weight measurement: Bed Scale   Volume Removed  approximately 2000 ml total, pre-weight was taken with leg immobilizer on, post weight immobilizer was able to come off per orders.   Treatment duration 3 hrs    NS given  No    Treatment shortened? No   Medications given during Rx Not Applicable   Estimated Kt/V  Not Applicable   Access type: AV fistula   Access Issues: No    Report called to primary nurse   Yes             Problem: METABOLIC, FLUID AND ELECTROLYTES - ADULT  Goal: Electrolytes maintained within normal limits  Description: INTERVENTIONS:  - Monitor labs and assess patient for signs and symptoms of electrolyte imbalances  - Administer electrolyte replacement as ordered  - Monitor response to electrolyte replacements, including repeat lab results as appropriate  - Instruct patient on fluid and nutrition as appropriate  Outcome: Progressing  Goal: Fluid balance maintained  Description: INTERVENTIONS:  - Monitor labs   - Monitor I/O and WT  - Instruct patient on fluid and nutrition as appropriate  - Assess for signs & symptoms of volume excess or deficit  Outcome: Progressing

## 2024-01-05 NOTE — ANESTHESIA PREPROCEDURE EVALUATION
Procedure:  IR CEREBRAL ANGIOGRAPHY / INTERVENTION    Relevant Problems   CARDIO   (+) Coronary artery disease involving native coronary artery of native heart without angina pectoris   (+) Essential (primary) hypertension   (+) Hyperlipidemia   (+) Hypertension   (+) Renovascular hypertension      ENDO   (+) Hypothyroidism   (+) Secondary hyperparathyroidism (HCC)   (+) Type 1 diabetes mellitus (HCC)   (+) Type 1 diabetes mellitus on insulin therapy (HCC)      GI/HEPATIC   (+) GERD (gastroesophageal reflux disease)      /RENAL   (+) Anemia due to chronic kidney disease, on chronic dialysis (HCC)   (+) End stage kidney disease (HCC)      HEMATOLOGY   (+) Anemia due to chronic kidney disease, on chronic dialysis (HCC)   (+) Anemia in chronic kidney disease   (+) Anemia in chronic kidney disease, on chronic dialysis (HCC)      NEURO/PSYCH   (+) Anxiety   (+) Binocular visual disturbance   (+) Cerebellar stroke syndrome   (+) Diabetic neuropathy (HCC)   (+) Gastroparesis diabeticorum    (+) Generalized anxiety disorder   (+) Mild episode of recurrent major depressive disorder ([facility])   (+) Provoked seizure (HCC)      PULMONARY   (+) RACHEAL (obstructive sleep apnea)      ESRD on M/W/F, last Hd Wed    Preop glucose 185    TTE 1/5/2024:    Left Ventricle: Left ventricular cavity size is normal. Wall thickness is moderately increased. There is moderate concentric hypertrophy. The left ventricular ejection fraction is 70%. Systolic function is vigorous. Wall motion is normal. Diastolic function is normal.    Left Atrium: The atrium is moderately dilated.    Right Atrium: The atrium is mildly dilated.    Atrial Septum: No patent foramen ovale detected, confirmed at rest using color doppler. Limited sensitivity.    Mitral Valve: There is trace regurgitation.    Pericardium: There is a trivial pericardial effusion posterior to the heart. The fluid exhibits no internal echoes.    CTA Head 1/5/2024:  1.  Small amount of  subarachnoid hemorrhage in the right basilar cistern region is again seen without significant change since the prior CT brain exam of the same day. No enhancing brain mass/fluid collection or evidence of vascular malformation. Major   intracranial dural venous sinuses are grossly patent.  2.  Mild scattered calcific atherosclerosis of the carotid bifurcation and cavernous carotid segments bilaterally without significant stenosis. Bilateral major cervical/intracranial arteries are patent with normal course and caliber. No arterial   occlusion, significant flow-limiting stenosis, or focal saccular aneurysm identified  3.  Nonspecific 4 mm right upper lobe lung nodule is seen. No routine follow-up imaging recommended per current Fleischner Society guidelines for low risk patient.  4.  Opacification of the right mastoid air cells could be secondary to mastoid effusion or mastoiditis, recommend clinical correlation with ENT exam findings. Left mastoid air cells and paranasal sinuses appear well aerated and clear.    Lab Results   Component Value Date    WBC 8.46 01/04/2024    HGB 11.5 (L) 01/04/2024    HCT 34.3 (L) 01/04/2024    MCV 94 01/04/2024     01/04/2024     Lab Results   Component Value Date    SODIUM 135 01/04/2024    K 4.7 01/05/2024    CL 91 (L) 01/04/2024    CO2 27 01/04/2024    BUN 56 (H) 01/04/2024    CREATININE 13.49 (H) 01/04/2024    GLUC 193 (H) 01/04/2024    CALCIUM 9.0 01/04/2024     Lab Results   Component Value Date    INR 1.04 01/05/2024    INR 0.98 11/26/2023    INR 1.00 12/23/2021    PROTIME 14.2 01/05/2024    PROTIME 13.6 11/26/2023    PROTIME 13.2 12/23/2021     Lab Results   Component Value Date    HGBA1C 6.0 (H) 10/28/2023          Physical Exam    Airway    Mallampati score: II  TM Distance: >3 FB  Neck ROM: full     Dental   No notable dental hx     Cardiovascular  Cardiovascular exam normal    Pulmonary  Pulmonary exam normal     Other Findings        Anesthesia Plan  ASA Score- 3      Anesthesia Type- general with ASA Monitors.         Additional Monitors:     Airway Plan: ETT.           Plan Factors-Exercise tolerance (METS): >4 METS.    Chart reviewed. EKG reviewed.  Existing labs reviewed. Patient summary reviewed.                  Induction- intravenous.    Postoperative Plan-     Informed Consent- Anesthetic plan and risks discussed with patient.  I personally reviewed this patient with the CRNA. Discussed and agreed on the Anesthesia Plan with the CRNA..

## 2024-01-05 NOTE — QUICK NOTE
gfr is 4, consulted MIKAEL Rivera to inform her. she will reach out to ordering dr to discuss how they want to go about this . 1.5 653pm  td

## 2024-01-05 NOTE — EMTALA/ACUTE CARE TRANSFER
[facility] EMERGENCY DEPARTMENT  [address] [place] [address]  Dept: [phone]      EMTALA TRANSFER CONSENT    NAME Mateus Mckeon                                         1985                              MRN [id number]    I have been informed of my rights regarding examination, treatment, and transfer   by Dr. Erendira Hernandez, DO    Benefits: Specialized equipment and/or services available at the receiving facility (Include comment)________________________    Risks: Potential for delay in receiving treatment, Potential deterioration of medical condition, Loss of IV, Possible worsening of condition or death during transfer, Increased discomfort during transfer      Transfer Request   I acknowledge that my medical condition has been evaluated and explained to me by the emergency department physician or other qualified medical person and/or my attending physician who has recommended and offered to me further medical examination and treatment. I understand the Hospital's obligation with respect to the treatment and stabilization of my emergency medical condition. I nevertheless request to be transferred. I release the Hospital, the doctor, and any other persons caring for me from all responsibility or liability for any injury or ill effects that may result from my transfer and agree to accept all responsibility for the consequences of my choice to transfer, rather than receive stabilizing treatment at the Hospital. I understand that because the transfer is my request, my insurance may not provide reimbursement for the services.  The Hospital will assist and direct me and my family in how to make arrangements for transfer, but the hospital is not liable for any fees charged by the transport service.  In spite of this understanding, I refuse to consent to further medical examination and treatment which has been offered to me, and request transfer to Accepting Facility  Name, City & State : [place], [place]. I authorize the performance of emergency medical procedures and treatments upon me in both transit and upon arrival at the receiving facility.  Additionally, I authorize the release of any and all medical records to the receiving facility and request they be transported with me, if possible.    I authorize the performance of emergency medical procedures and treatments upon me in both transit and upon arrival at the receiving facility.  Additionally, I authorize the release of any and all medical records to the receiving facility and request they be transported with me, if possible.  I understand that the safest mode of transportation during a medical emergency is an ambulance and that the Hospital advocates the use of this mode of transport. Risks of traveling to the receiving facility by car, including absence of medical control, life sustaining equipment, such as oxygen, and medical personnel has been explained to me and I fully understand them.    (VALARIE CORRECT BOX BELOW)  [  ]  I consent to the stated transfer and to be transported by ambulance/helicopter.  [  ]  I consent to the stated transfer, but refuse transportation by ambulance and accept full responsibility for my transportation by car.  I understand the risks of non-ambulance transfers and I exonerate the Hospital and its staff from any deterioration in my condition that results from this refusal.    X___________________________________________    DATE  24  TIME________  Signature of patient or legally responsible individual signing on patient behalf           RELATIONSHIP TO PATIENT_________________________          Provider Certification    NAME Mateus Mckeon                                         1985                              MRN [id number]    A medical screening exam was performed on the above named patient.  Based on the examination:    Condition Necessitating Transfer The primary  encounter diagnosis was Hypertension. Diagnoses of ESRD (end stage renal disease) (HCC) and SAH (subarachnoid hemorrhage) (HCC) were also pertinent to this visit.    Patient Condition: The patient has been stabilized such that within reasonable medical probability, no material deterioration of the patient condition or the condition of the unborn child(lien) is likely to result from the transfer    Reason for Transfer: Level of Care needed not available at this facility (neuro icu)    [facility]   Space available and qualified personnel available for treatment as acknowledged by Clementina  Agreed to accept transfer and to provide appropriate medical treatment as acknowledged by          Appropriate medical records of the examination and treatment of the patient are provided at the time of transfer   STAFF INITIAL WHEN COMPLETED _______  Transfer will be performed by qualified personnel from [facility]  and appropriate transfer equipment as required, including the use of necessary and appropriate life support measures.    Provider Certification: I have examined the patient and explained the following risks and benefits of being transferred/refusing transfer to the patient/family:  General risk, such as traffic hazards, adverse weather conditions, rough terrain or turbulence, possible failure of equipment (including vehicle or aircraft), or consequences of actions of persons outside the control of the transport personnel      Based on these reasonable risks and benefits to the patient and/or the unborn child(lien), and based upon the information available at the time of the patient’s examination, I certify that the medical benefits reasonably to be expected from the provision of appropriate medical treatments at another medical facility outweigh the increasing risks, if any, to the individual’s medical condition, and in the case of labor to the unborn child, from effecting the  transfer.    X____________________________________________ DATE 01/05/24        TIME_______      ORIGINAL - SEND TO MEDICAL RECORDS   COPY - SEND WITH PATIENT DURING TRANSFER

## 2024-01-05 NOTE — PROGRESS NOTES
Hemodialysis note:    Patient was seen and examined on hemodialysis today at approximately 3:50 PM.  Will plan on a 3-hour treatment 2 L ultrafiltration.  Just completed a cerebral angiogram with no evidence of aneurysm  Continue blood pressure support  Will monitor over the weekend

## 2024-01-05 NOTE — ANESTHESIA POSTPROCEDURE EVALUATION
Post-Op Assessment Note    CV Status:  Stable    Pain management: adequate       Mental Status:  Sleepy and arousable   PONV Controlled:  None   Airway Patency:  Patent and adequate     Post Op Vitals Reviewed: Yes      Staff: CRNA, Anesthesiologist               BP   132/56   Temp      Pulse 75   Resp 16   SpO2 99%

## 2024-01-05 NOTE — PROGRESS NOTES
Pastoral Care Progress Note    2024  Patient: Mateus Mckeon : 1985  Admission Date & Time: 2024 0514  MRN: [id number] [facility]: [id number]        Pt lay awake in bed and presented as quiet with eyes covered and not contributing to the conversation  shared with his mother who was at bedside. According to mother, pt has no interest in participating in any support groups for his medical issues. He is well supported by his parents, with whom he lives. Mother shared that she is tired and its been hard to watch the pt suffer with his illness since the age of 4. Chaplains remain available.             Chaplaincy Interventions Utilized:   Empowerment: Encouraged focus on present, Encouraged self-care, Normalized experience of patient/family, Provided anxiety containment, and Provided chaplaincy education    Exploration: Explored emotional needs & resources, Explored spiritual needs & resources, and Facilitated story telling    Relationship Building: Cultivated a relationship of care and support and Listened empathically     Chaplaincy Outcomes Achieved:  Debriefed/defused experience and Expressed gratitude       24 1000   Clinical Encounter Type   Visited With Patient and family together   Routine Visit Introduction

## 2024-01-05 NOTE — PLAN OF CARE
Problem: INFECTION - ADULT  Goal: Absence or prevention of progression during hospitalization  Description: INTERVENTIONS:  - Assess and monitor for signs and symptoms of infection  - Monitor lab/diagnostic results  - Monitor all insertion sites, i.e. indwelling lines, tubes, and drains  - Monitor endotracheal if appropriate and nasal secretions for changes in amount and color  - [place] appropriate cooling/warming therapies per order  - Administer medications as ordered  - Instruct and encourage patient and family to use good hand hygiene technique  - Identify and instruct in appropriate isolation precautions for identified infection/condition  Outcome: Progressing     Problem: Knowledge Deficit  Goal: Patient/family/caregiver demonstrates understanding of disease process, treatment plan, medications, and discharge instructions  Description: Complete learning assessment and assess knowledge base.  Interventions:  - Provide teaching at level of understanding  - Provide teaching via preferred learning methods  Outcome: Progressing

## 2024-01-05 NOTE — SEDATION DOCUMENTATION
Diagnostic cerebral angiogram successfully completed by Dr. Mittal without complications. Tolerated well with anesthesia support. To transport via bed to icu by crna and ir rn with bedside report.

## 2024-01-05 NOTE — ASSESSMENT & PLAN NOTE
Right basilar cistern SAH  BD 6, HH 2, MF 3.  P/w HA since last Sunday.  Hx ASA use for hx multiple strokes, reversed with DDAVP.  On current exam, c/o headache, otherwise non-focal. GCS 15.     Imaging:  CTA head and neck w/wo, 1/5/2024: 1.  Small amount of subarachnoid hemorrhage in the right basilar cistern region is again seen without significant change since the prior CT brain exam of the same day. No enhancing brain mass/fluid collection or evidence of vascular malformation. Major intracranial dural venous sinuses are grossly patent.2.  Mild scattered calcific atherosclerosis of the carotid bifurcation and cavernous carotid segments bilaterally without significant stenosis. Bilateral major cervical/intracranial arteries are patent with normal course and caliber. No arterial occlusion, significant flow-limiting stenosis, or focal saccular aneurysm identified3.  Nonspecific 4 mm right upper lobe lung nodule is seen. No routine follow-up imaging recommended per current Fleischner Society guidelines for low risk patient.  CT head wo, 1/5/2024: 1.  Right prepontine/premedullary cistern subarachnoid hemorrhage. Minimal mass effect on the right middle cerebellar peduncle. Short-term follow-up is recommended. 2.  Opacification of the right mastoid air cells correlate clinically for mastoiditis.    Plan:  Continue to monitor neuro exam closely.  STAT CTA with decline in GCS > 2 pts in 1 hour.  Plan for formal angiogram this am 1/5 with Dr. Mittal.  Maintain SBP < 160 mmHg.  Will maintain on SAH pathway until proven otherwise:  Spasm watch  Daily TCDs: R , L   Continue Nimodipine 60mg Q4H  Normonatremia (today 135), euvolemia (+150/24H), Hgb >8 (today 11.5)   SBP<160 mmHg  DVT PPX: SCDs.  Mobilize as tolerated.  DVT ppx: SCD's, hold pharmacologic DVT ppx.    Neurosurgery will continue to follow. Call with questions.

## 2024-01-05 NOTE — ED PROVIDER NOTES
History  Chief Complaint   Patient presents with    Headache     Pt states he has headache since Sunday 12/31 after dialysis. Pt gets dialysis MWF usually. States the dialysis made him too dry and has had severe headache since. C/o N/V, dizziness. Hx of stroke in 2018. Denies chest pain/SOB/blurred vision/light sensitivity, sensitivity to sound.      Mateus Mckeon is a 38 y.o. male with a PMH of end-stage renal disease no longer making urine, Monday Wednesday Friday dialysis, type 1 diabetes, hypertension presenting to the ED on January 4, 2024 with headache. Patient endorses that he has had this headache since Sunday.  He was seen here yesterday and discharged.  Patient reports that due to the holiday his dialysis schedule was different this week.  He went to dialysis on Sunday where he thinks they dried him out too much.  The headache began then and has not resolved since.  He returned to dialysis on Wednesday where he only got about an hour in until he had a near syncopal event.  He was sent to the emergency department and treated here for headache and high blood pressure requiring multiple IV antihypertensives to control his blood pressure.  He states he had some minor relief of the headache however it did return.  Patient is back today and also endorsing nausea and vomiting. Patient denies chest pain, shortness of breath, abdominal pain, blurred vision, photophobia or sensitivity to sound.         Headache  Associated symptoms: dizziness, nausea and vomiting    Associated symptoms: no abdominal pain, no back pain, no congestion, no fever, no neck pain, no neck stiffness, no photophobia and no sore throat        Prior to Admission Medications   Prescriptions Last Dose Informant Patient Reported? Taking?   B-D ULTRAFINE III SHORT PEN 31G X 8 MM MISC 1/4/2024 Self No Yes   Sig: USE 1 PEN NEEDLE 8 TIMES DAILY   GLUCAGON EMERGENCY 1 MG injection More than a month Self Yes No   Gvoke HypoPen 1-Pack 1 MG/0.2ML  SOAJ 2024 Self Yes Yes   Insulin Disposable Pump (Omnipod 5 G6 Intro, Gen 5,) KIT 2024 Self Yes Yes   Insulin Disposable Pump (Omnipod 5 G6 Pod, Gen 5,) MISC 2024 Self Yes Yes   NIFEdipine (PROCARDIA XL) 90 mg 24 hr tablet 2024 Self No Yes   Sig: Take 1 tablet (90 mg total) by mouth daily   NovoLOG 100 UNIT/ML injection More than a month Self Yes No   Patiromer Sorbitex Calcium (VELTASSA PO) More than a month Self Yes No   Sig: Take 5 g by mouth once a week   SPS 15 GM/60ML suspension More than a month Self Yes No   Sig: TAKE 60 ML BY MOUTH SINGLE DOSE FOR EMERGENCY USE DURING MISSED HEMODIALYSIS   Velphoro 500 MG CHEW 2024  Yes Yes   Sig: CRUSH OR CHEW AND SWALLOW 2 TABLETS 3 TIMES A DAY WITH MEALS   aspirin (ECOTRIN LOW STRENGTH) 81 mg EC tablet 2024 Self Yes Yes   Sig: Take 81 mg by mouth daily   atorvastatin (LIPITOR) 40 mg tablet 2024  No Yes   Sig: Take 1 tablet (40 mg total) by mouth every evening   b complex vitamins capsule 2024 Self Yes Yes   Sig: Take 1 capsule by mouth daily before lunch    calcium acetate (PHOSLO) capsule 2024 Self Yes Yes   Sig: TAKE 3 CAPSULES BY MOUTH WITH MEALS   cinacalcet (SENSIPAR) 60 MG tablet 2024 Self Yes Yes   Sig: Take 120 mg by mouth daily 2 tab daily    cloNIDine (CATAPRES-TTS-3) 0.3 mg/24 hr 2024 Self Yes Yes   Si patch once a week   escitalopram (LEXAPRO) 20 mg tablet 2024 Self No Yes   Sig: Take 1 tablet (20 mg total) by mouth daily   famotidine (PEPCID) 40 MG tablet 2024  No Yes   Sig: TAKE 1 TABLET BY MOUTH IN THE MORNING   gabapentin (NEURONTIN) 100 mg capsule 2024  No Yes   Sig: TAKE 2 CAPSULES BY MOUTH AT BEDTIME   hydrALAZINE (APRESOLINE) 25 mg tablet 2024 Self No Yes   Sig: Take 1 tablet (25 mg total) by mouth 3 (three) times a day   hydrOXYzine HCL (ATARAX) 10 mg tablet 2024 Self No Yes   Sig: Take 1 tablet (10 mg total) by mouth every 6 (six) hours as needed for itching or anxiety   labetalol  (NORMODYNE) 200 mg tablet 1/4/2024 Self No Yes   Sig: TAKE 2 TABLETS BY MOUTH THREE TIMES DAILY   lisinopril (ZESTRIL) 40 mg tablet 1/4/2024 Self Yes Yes   Sig: Take 80 mg by mouth daily   mupirocin (BACTROBAN) 2 % ointment More than a month Self No No   Sig: Apply topically 3 (three) times a day Until lesion on right hand heals.   ondansetron (ZOFRAN) 4 mg tablet 1/4/2024 Self No Yes   Sig: Take 1 tablet (4 mg total) by mouth every 8 (eight) hours as needed for nausea or vomiting   saccharomyces boulardii (FLORASTOR) 250 mg capsule 1/4/2024 Self Yes Yes   Sig: Take 250 mg by mouth daily   traZODone (DESYREL) 50 mg tablet More than a month Self No No   Sig: Take 0.5 tablets (25 mg total) by mouth daily at bedtime as needed for sleep   triamcinolone (KENALOG) 0.1 % ointment More than a month Self No No   Sig: Apply topically 2 (two) times a day For up to 14 days on the itchy areas. Avoid groin, underarms and face. It is important to take at least 1 week break between uses.      Facility-Administered Medications: None       Past Medical History:   Diagnosis Date    Acute kidney injury (HCC)     Ambulates with cane     Anuria     Anxiety     Cellulitis of right elbow 03/31/2021    Chronic kidney disease     Depression     Diabetes mellitus (HCC)     Diarrhea     Emesis 10/24/2020    End stage renal disease (HCC) 02/11/2018    Formatting of this note might be different from the original. Last Assessment & Plan:  Secondary to DM.  On nightly PD.  Followed by [facility].  [facility] Formatting of this note might be different from the original. Last Assessment & Plan:  Formatting of this note might be different from the original. 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    Esophagitis 07/21/2015    Falls     Gastroparesis     GERD (gastroesophageal reflux disease)     History of shingles 2010    History of transfusion 02/2018     no adverse reaction    Hyperlipidemia     Hyperphosphatemia     Hypertension     Hypoglycemia 07/15/2022    Itching     Mastoiditis of right side 07/15/2022    Muscle weakness     general unsteadiness    Obesity (BMI 30.0-34.9) 09/09/2019    Orthostatic hypotension 10/25/2020    Peripheral polyneuropathy 11/20/2019    PONV (postoperative nausea and vomiting) 01/26/2018    Protein-calorie malnutrition (HCC) 11/23/2020    Recurrent peritonitis (HCC) due to peritoneal dialysis catheter 07/31/2020    Retinopathy     Seizures (HCC)     early 2020 - one time    Skin abnormality     some dime size areas where skin was scratched from itching    Spontaneous bacterial peritonitis (HCC) 10/19/2020    Squamous cell skin cancer     left temple    Stroke (HCC)     x2 - off balance/no driving/fatigue    Swelling of both lower extremities     Traumatic onycholysis 07/21/2022    Vomiting     Wears glasses        Past Surgical History:   Procedure Laterality Date    CARDIAC ELECTROPHYSIOLOGY PROCEDURE N/A 9/21/2023    Procedure: Cardiac loop recorder explant;  Surgeon: Parish Morgan MD;  Location: [facility];  Service: Cardiology    CARDIAC LOOP RECORDER  05/2018    COLONOSCOPY      EGD      EYE SURGERY Right     IR AV FISTULAGRAM/GRAFTOGRAM  02/23/2021    IR TUNNELED CENTRAL LINE PLACEMENT  02/16/2021    IR TUNNELED DIALYSIS CATHETER PLACEMENT  11/18/2020    IR TUNNELED DIALYSIS CATHETER REMOVAL  02/12/2021    IR TUNNELED DIALYSIS CATHETER REMOVAL  03/11/2021    MOHS SURGERY Left 12/14/2022    Left temple with Dr. Hassan    PERITONEAL CATHETER INSERTION N/A 08/27/2018    Procedure: UNROOF PD CATHETER;  Surgeon: Felipe Lindo DO;  Location: [facility];  Service: General    MA ARTERIOVENOUS ANASTOMOSIS OPEN DIRECT Left 11/09/2020    Procedure: CREATION FISTULA  ARTERIOVENOUS (AV) - LEFT WRIST;  Surgeon: Placido Altamirano MD;  Location: [facility];  Service: Vascular    MA ESOPHAGOGASTRODUODENOSCOPY TRANSORAL DIAGNOSTIC N/A  2019    Procedure: ESOPHAGOGASTRODUODENOSCOPY (EGD);  Surgeon: Ale Figueroa MD;  Location: [facility];  Service: Gastroenterology    SC LAPS INSERTION TUNNELED INTRAPERITONEAL CATHETER N/A 2018    Procedure: LAPAROSCOPIC PD CATHETER PLACEMENT;  Surgeon: Felipe Lindo DO;  Location: [facility];  Service: General    SC REMOVAL TUNNELED INTRAPERITONEAL CATHETER N/A 2020    Procedure: REMOVAL CATHETER PERITONEAL DIALYSIS;  Surgeon: Abdifatah Ty MD;  Location: [facility];  Service: General    TONSILLECTOMY      UPPER GASTROINTESTINAL ENDOSCOPY         Family History   Problem Relation Age of Onset    Breast cancer Mother     Hypertension Mother     Hyperlipidemia Father     Hypertension Father     Leukemia Maternal Grandmother     Hyperlipidemia Maternal Grandfather     Hypertension Maternal Grandfather     Hyperlipidemia Paternal Grandmother     Hypertension Paternal Grandmother     Heart disease Paternal Grandfather         cardiac disorder    Diabetes Paternal Grandfather      I have reviewed and agree with the history as documented.    E-Cigarette/Vaping    E-Cigarette Use Current Every Day User     Comments medical marijuana      E-Cigarette/Vaping Substances    Nicotine No     THC Yes     CBD Yes     Flavoring No     Other No     Unknown No      Social History     Tobacco Use    Smoking status: Former     Current packs/day: 0.00     Average packs/day: 0.5 packs/day for 12.0 years (6.0 ttl pk-yrs)     Types: Cigarettes     Start date: 2006     Quit date: 2018     Years since quittin.9    Smokeless tobacco: Never    Tobacco comments:     quit 2018   Vaping Use    Vaping status: Every Day    Substances: THC, CBD   Substance Use Topics    Alcohol use: Not Currently    Drug use: Yes     Types: Marijuana     Comment: medical marijuana       Review of Systems   Constitutional:  Negative for chills and fever.   HENT:  Negative for congestion and sore throat.         No phonophobia    Eyes:  Negative for photophobia and visual disturbance.   Respiratory:  Negative for chest tightness and shortness of breath.    Cardiovascular:  Negative for chest pain and palpitations.   Gastrointestinal:  Positive for nausea and vomiting. Negative for abdominal pain.   Genitourinary:         Patient is anuric   Musculoskeletal:  Negative for back pain, neck pain and neck stiffness.   Skin:  Negative for color change, pallor and rash.   Neurological:  Positive for dizziness, light-headedness and headaches. Negative for tremors.   Psychiatric/Behavioral:  Negative for confusion.        Physical Exam  Physical Exam  Vitals and nursing note reviewed.   Constitutional:       General: He is not in acute distress.     Appearance: Normal appearance. He is normal weight. He is not ill-appearing, toxic-appearing or diaphoretic.   HENT:      Head: Normocephalic and atraumatic.      Right Ear: External ear normal.      Left Ear: External ear normal.      Nose: Nose normal. No congestion or rhinorrhea.      Mouth/Throat:      Mouth: Mucous membranes are moist.   Eyes:      General: No scleral icterus.        Right eye: No discharge.         Left eye: No discharge.      Extraocular Movements: Extraocular movements intact.      Conjunctiva/sclera: Conjunctivae normal.   Cardiovascular:      Rate and Rhythm: Normal rate and regular rhythm.      Heart sounds: Normal heart sounds. No murmur heard.     No friction rub. No gallop.   Pulmonary:      Effort: Pulmonary effort is normal. No respiratory distress.      Breath sounds: Normal breath sounds. No wheezing, rhonchi or rales.   Abdominal:      General: Abdomen is flat. Bowel sounds are normal. There is no distension.      Palpations: Abdomen is soft.      Tenderness: There is no abdominal tenderness. There is no guarding or rebound.   Musculoskeletal:         General: No signs of injury. Normal range of motion.      Cervical back: Normal range of motion and neck supple. No  rigidity.   Skin:     General: Skin is warm.      Capillary Refill: Capillary refill takes less than 2 seconds.      Coloration: Skin is not pale.      Findings: No erythema.   Neurological:      General: No focal deficit present.      Mental Status: He is alert and oriented to person, place, and time. Mental status is at baseline.      Motor: No weakness.      Gait: Gait normal.   Psychiatric:         Mood and Affect: Mood normal.         Behavior: Behavior normal.         Vital Signs  ED Triage Vitals [01/04/24 2217]   Temperature Pulse Respirations Blood Pressure SpO2   97.8 °F (36.6 °C) 70 18 (!) 247/107 98 %      Temp Source Heart Rate Source Patient Position - Orthostatic VS BP Location FiO2 (%)   Oral Monitor Sitting Right arm --      Pain Score       8           Vitals:    01/05/24 0230 01/05/24 0245 01/05/24 0300 01/05/24 0315   BP: (!) 194/95 (!) 196/95 (!) 200/89 (!) 187/91   Pulse: 72 70 69 69   Patient Position - Orthostatic VS: Lying Lying Lying Lying         Visual Acuity  Visual Acuity      Flowsheet Row Most Recent Value   L Pupil Size (mm) 3   R Pupil Size (mm) 3            ED Medications  Medications   labetalol (NORMODYNE) injection 20 mg (20 mg Intravenous Given 1/4/24 2258)   ondansetron (ZOFRAN) injection 4 mg (4 mg Intravenous Given 1/4/24 2256)   labetalol (NORMODYNE) injection 20 mg (20 mg Intravenous Given 1/4/24 2350)   ondansetron (ZOFRAN) injection 4 mg (4 mg Intravenous Given 1/5/24 0106)   labetalol (NORMODYNE) injection 20 mg (20 mg Intravenous Given 1/5/24 0218)       Diagnostic Studies  Results Reviewed       Procedure Component Value Units Date/Time    Comprehensive metabolic panel [[id number]]  (Abnormal) Collected: 01/04/24 2243    Lab Status: Final result Specimen: Blood from Arm, Right Updated: 01/04/24 2309     Sodium 135 mmol/L      Potassium 5.3 mmol/L      Chloride 91 mmol/L      CO2 27 mmol/L      ANION GAP 17 mmol/L      BUN 56 mg/dL      Creatinine 13.49 mg/dL       Glucose 193 mg/dL      Calcium 9.0 mg/dL      AST 15 U/L      ALT 10 U/L      Alkaline Phosphatase 79 U/L      Total Protein 6.3 g/dL      Albumin 4.1 g/dL      Total Bilirubin 0.62 mg/dL      eGFR 4 ml/min/1.73sq m     Narrative:      National Kid[employer] Foundation guidelines for Chronic Kidney Disease (CKD):     Stage 1 with normal or high GFR (GFR > 90 mL/min/1.73 square meters)    Stage 2 Mild CKD (GFR = 60-89 mL/min/1.73 square meters)    Stage 3A Moderate CKD (GFR = 45-59 mL/min/1.73 square meters)    Stage 3B Moderate CKD (GFR = 30-44 mL/min/1.73 square meters)    Stage 4 Severe CKD (GFR = 15-29 mL/min/1.73 square meters)    Stage 5 End Stage CKD (GFR <15 mL/min/1.73 square meters)  Note: GFR calculation is accurate only with a steady state creatinine    CBC and differential [[id number]]  (Abnormal) Collected: 01/04/24 2243    Lab Status: Final result Specimen: Blood from Arm, Right Updated: 01/04/24 2255     WBC 8.46 Thousand/uL      RBC 3.64 Million/uL      Hemoglobin 11.5 g/dL      Hematocrit 34.3 %      MCV 94 fL      MCH 31.6 pg      MCHC 33.5 g/dL      RDW 13.8 %      MPV 10.0 fL      Platelets 210 Thousands/uL      nRBC 0 /100 WBCs      Neutrophils Relative 71 %      Immat GRANS % 0 %      Lymphocytes Relative 17 %      Monocytes Relative 7 %      Eosinophils Relative 4 %      Basophils Relative 1 %      Neutrophils Absolute 6.01 Thousands/µL      Immature Grans Absolute 0.03 Thousand/uL      Lymphocytes Absolute 1.44 Thousands/µL      Monocytes Absolute 0.59 Thousand/µL      Eosinophils Absolute 0.33 Thousand/µL      Basophils Absolute 0.06 Thousands/µL                    XR chest 1 view portable   ED Interpretation by Elizabeth Andrade PA-C (01/05 0110)   No acute cardiopulmonary abnormalities      CT head without contrast    (Results Pending)              Procedures  ECG 12 Lead Documentation Only    Date/Time: 1/4/2024 11:38 PM    Performed by: Elizabeth Andrade PA-C  Authorized by: Elizabeth  SAV Andrade    Indications / Diagnosis:  Htn + ha  ECG reviewed by me, the ED Provider: yes    Patient location:  ED  Previous ECG:     Previous ECG:  Compared to current    Comparison ECG info:  1/3/2024    Similarity:  No change    Comparison to cardiac monitor: Yes    Interpretation:     Interpretation: normal    Rate:     ECG rate:  68    ECG rate assessment: normal    Rhythm:     Rhythm: sinus rhythm    Ectopy:     Ectopy: none    QRS:     QRS axis:  Normal    QRS intervals:  Normal  Conduction:     Conduction: normal    ST segments:     ST segments:  Normal  T waves:     T waves: normal    Other findings:     Other findings: prolonged qTc interval             ED Course  ED Course as of 01/05/24 0321   Thu Jan 04, 2024   2243 Patient seen here yesterday for same headache and hypertension.  Was put on a Cardene drip, nephrology did see him and okayed him to go home so he was sent home.  Spoke with patient today stating that he will likely stay in the hospital overnight.  Put in for 20 of labetalol to start   2335 Blood Pressure(!): 229/101  Will try one more dose of IV labetalol and then go to nicardipine if that does not bring his pressure down, pt vomited in the room                                             Medical Decision Making  Patient seen and examined noted to have HTN and ESRD.  Patient presenting today as his headache that began on Sunday has not resolved.  He was seen here yesterday and started on multiple IV antihypertensives including nicardipine.  Patient's blood pressure was lowered prior to being discharged.  He states he did have some relief of the headache however not full relief.  Today his headache is back in full force and he is presenting with a blood pressure of 247/107.  He states that he is due for dialysis tomorrow morning.  Gave patient 2 doses of 20 mg IV labetalol.  Initially plan was to try the labetalol and then escalate to nicardipine as patient required that yesterday and  "then admit to critical care however patient's blood pressure came down under 200 after medication and he was admitted to [facility].    Differential diagnosis includes but is not limited to tension headache, cluster headache, migraine, HTN, ICH, SAH, tumor, meningitis, temporal arteritis, carbon monoxide poisoning, zoster, sinusitis.      Patient's labs notable for: Creatinine of 13.49 BUN of 56 and glucose of 193    Imaging revealed:   No acute cardiopulmonary abnormalities    EKG: Was interpreted by myself as above.    Discussed patient's case with Dr. Finn ([facility] resident) regarding admission who accepted the patient for further evaluation and management to Dr. Hernandez service.    All labs reviewed and utilized in the medical decision making process (if labs were ordered). Portions of the record may have been created with voice recognition software.  Occasional wrong word or \"sound a like\" substitutions may have occurred due to the inherent limitations of voice recognition software.  Read the chart carefully and recognize, using context, where substitutions have occurred.      Amount and/or Complexity of Data Reviewed  Labs: ordered.  Radiology: ordered and independent interpretation performed.    Risk  Prescription drug management.  Decision regarding hospitalization.             Disposition  Final diagnoses:   Hypertension   ESRD (end stage renal disease) (HCC)     Time reflects when diagnosis was documented in both MDM as applicable and the Disposition within this note       Time User Action Codes Description Comment    1/5/2024  2:07 AM Elizabeth Andrade Add [I10] Hypertension     1/5/2024  2:07 AM Elizabeth Andrade Add [N18.6] ESRD (end stage renal disease) (HCC)           ED Disposition       ED Disposition   Admit    Condition   Stable    Date/Time   Fri Jan 5, 2024  1:46 AM    Comment   Case was discussed with Dr Finn and the patient's admission status was agreed to be Admission Status: inpatient status " to the service of Dr. Hernandez .               Follow-up Information    None         Patient's Medications   Discharge Prescriptions    No medications on file       No discharge procedures on file.    PDMP Review         Value Time User    PDMP Reviewed  Yes 7/15/2022  4:20 AM Guanaco Collins MD            ED Provider  Electronically Signed by             Elizabeth Andrade PA-C  01/05/24 0321

## 2024-01-05 NOTE — CASE MANAGEMENT
Case Management Assessment & Discharge Planning Note    Patient name Mateus Mckeon  Location ICU 03/ICU 03 MRN [id number]  : 1985 Date 2024       Current Admission Date: 2024  Current Admission Diagnosis:Subarachnoid hemorrhage (HCC)   Patient Active Problem List    Diagnosis Date Noted    Subarachnoid hemorrhage (HCC) 2024    Anemia due to chronic kidney disease, on chronic dialysis (HCC) 2024    Type 1 diabetes mellitus on insulin therapy (HCC) 2023    Hyperlipidemia 2023    Anxiety 2023    Insomnia 2023    RACHEAL (obstructive sleep apnea)     Status post placement of implantable loop recorder 2022    Coronary artery disease involving native coronary artery of native heart without angina pectoris 2022    Pulmonary HTN (HCC) 2022    Hypothyroidism 2022    Cerebellar stroke syndrome 2022    Anemia in chronic kidney disease 2022    Essential (primary) hypertension 2022    Numbness of arm 2021    Mild episode of recurrent major depressive disorder (HCC) 2021    Generalized anxiety disorder 2021    Medical cannabis use 04/15/2021    Tubulovillous adenoma of colon 2021    GERD (gastroesophageal reflux disease) 2021    End stage kidney disease (HCC) 2020    Secondary hyperparathyroidism (HCC) 10/23/2020    Diabetic neuropathy (HCC) 2020    Provoked seizure ([facility]) 2020    Asterixis 2020    Foot drop, bilateral 2020    Impaired mobility and ADLs 2020    Vertigo 2020    Multiple pulmonary nodules 2019    Gastroparesis diabeticorum  2019    Pruritus 2019    Environmental and seasonal allergies 2019    Strabismus 2018    Dyslipidemia 2018    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) 2018    Renovascular hypertension 2018    Left atrial dilation 2018    Bilateral leg edema 2018     Persistent proteinuria 02/11/2018    Anemia in chronic kidney disease, on chronic dialysis (HCC) 02/10/2018    Hypertension 02/09/2018    Homozygous MTHFR mutation C677T 02/05/2018    Hyperphosphatemia 01/29/2018    Vitamin D deficiency 01/29/2018    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,   01/28/2018    Binocular visual disturbance 01/28/2018    History of lacunar cerebrovascular accident (CVA) 01/22/2018    Type 1 diabetes mellitus (HCC) 06/19/2017    Ataxia 07/21/2015    Gastroenteritis 07/21/2015      LOS (days): 0  Geometric Mean LOS (GMLOS) (days): 4.6  Days to GMLOS:4.2     OBJECTIVE:    Risk of Unplanned Readmission Score: 26.05         Current admission status: Inpatient       Preferred Pharmacy:   [facility] [address] [place] [address], [place]  [address] [address]  Phone: [phone] Fax: [phone]    [facility] [address] [facility] [address] [facility]  [address] [facility] [facility] [address]  Phone: [phone] Fax: [phone]    Primary Care Provider: Dania Sher DO    Primary Insurance: MEDICARE  Secondary Insurance: [employer] [facility]    ASSESSMENT:  Active Health Care Proxies       rFancine Mckeon Health Care Representative - Mother   Primary Phone: [phone] (Mobile)  Home Phone: [phone]                           Readmission Root Cause  30 Day Readmission: No    Patient Information  Admitted from:: Home  Mental Status: Alert  During Assessment patient was accompanied by: Not accompanied during assessment  Assessment information provided by:: Parent (mother, Francine)  Primary Caregiver: Self  Support Systems: Self, Parent  [place]  Living Arrangements: Lives w/ Parent(s)    Activities of Daily Living Prior to Admission  Functional Status: Independent  Completes ADLs independently?: Yes  Ambulates independently?: Yes  Does patient use assisted  devices?: Yes  Assisted Devices (DME) used: Straight Cane (uses when he goes out-not in the house)  Does patient have a history of Outpatient Therapy (PT/OT)?: No  Does the patient have a history of Short-Term Rehab?: Yes  Does patient have a history of HHC?: Yes (unable to recall)  Does patient currently have HHC?: No         Patient Information Continued  Income Source: SSI/SSD  Does patient have prescription coverage?: Yes  Does patient receive dialysis treatments?: Yes ([facility] MWF 0600 chair time. Father transports)  Does patient have a history of substance abuse?: No  Does patient have a history of Mental Health Diagnosis?: Yes  Is patient receiving treatment for mental health?: Yes (follows w/ psychiatrist for depression. medications Rx)  Has patient received inpatient treatment related to mental health in the last 2 years?: No         Means of Transportation  Means of Transport to Appts:: Family transport      Housing Stability: Unknown (1/5/2024)    Housing Stability Vital Sign     Unable to Pay for Housing in the Last Year: No     Number of Places Lived in the Last Year: Not on file     Unstable Housing in the Last Year: No   Food Insecurity: No Food Insecurity (1/5/2024)    Hunger Vital Sign     Worried About Running Out of Food in the Last Year: Never true     Ran Out of Food in the Last Year: Never true   Transportation Needs: No Transportation Needs (1/5/2024)    PRAPARE - Transportation     Lack of Transportation (Medical): No     Lack of Transportation (Non-Medical): No   Utilities: Not At Risk (1/5/2024)    [facility] Utilities     Threatened with loss of utilities: No       DISCHARGE DETAILS:    Discharge planning discussed with:: pt not available at bedside. TC to Maggiehy/EC/HCR        CM contacted family/caregiver?: Yes             Contacts  Patient Contacts: mother Francine  Relationship to Patient:: Family  Contact Method: Phone  Reason/Outcome: Emergency Contact, Discharge Planning,  Continuity of Care         Would you like to participate in our [facility] service program?  : No - Declined       Additional Comments: TC to patient's motherFrancine to introduce CM role/team, obtain baseline assessment information and discuss DCP. Pt lives w/ parent. IPTA. Dialysis MWF 0600/father drives. CM will follow for d/c needs

## 2024-01-05 NOTE — OP NOTE
OPERATIVE REPORT  PATIENT NAME: Mateus Mckeon     :  1985  MRN: [id number]   Pt Location: Interventional radiology    SURGERY DATE: 24     Preop Diagnosis:  1. SAH, consistent with BPMH    Postop Diagnosis  1. SAH, consistent with BPMH    Procedure:  Use of ultrasound  Right radial arteriogram   Right Common Carotid Arteriogram  Right Internal Carotid Arteriogram  Left Common Carotid Arteriogram  Left Internal Carotid Arteriogram  Left Vertebral Artery Arteriogram    Surgeon:   Jeremy Mittal MD    Specimen(s):  None    Estimated Blood Loss:   None    Drains:  None    Anesthesia Type:   Monitored Anesthesia Care     Complications:  None    Operative Indications:  Mateus Mckeon  is a very pleasant 38 y.o. male who presented as BD6SAH after dialysis.  After discussing the risks and benefits of a diagnostic cerebral arteriogram including bleeding, stroke, groin hematoma, and death the patient elected to proceed.     Procedure Details:  After obtaining written informed consent, the patient was brought into the operating room and moved to the OR table in supine fashion. The right radial artery was prepped and draped in the usual sterile fashion. Monitored anesthesia care was induced.  A surgical time-out was performed. . Next, a 5 Syriac tapered sheath was then placed into the right femoral artery.      The catheter was then advanced into the aortic arch and advanced into the left vertebral artery. Transfascial lateral and oblique images of the left vertebral artery intracranial circulation were obtained.     The catheter was then withdrawn into the aortic arch and advanced into the left common carotid artery. AP lateral and oblique images of the left cervical carotid were obtained.     The catheter was then advanced and the left internal carotid artery was then catheterized. AP lateral and magnified oblique images of the left intracranial carotid circulation were obtained.     The right common  carotid artery was then catheterized. AP lateral and oblique images of the right cervical carotid were obtained.     The catheter was then advanced and the right internal carotid artery was then catheterized. AP lateral and magnified oblique images of the right intracranial carotid circulation were obtained.     The catheter was then withdrawn from the body and a TR compression band was placed.  There was appropriate hemostasis.   The patient was then awoken from monitored anesthesia care and found to be in baseline neurologic condition. All sponge and needle counts were correct.        INTERPRETATION OF ANGIOGRAPHIC FINDINGS:   1.  The Left vertebral intracranial circulation reveals retrograde reflux down the contralateral vertebral artery. Both PICAs are well visualized. Antegrade flow is present into all the posterior circulation branches. There are no AVMs or aneurysms. The capillary and venous phases are unremarkable.     2. The Left common carotid circulation reveals antegrade flow into the internal and external carotid arteries. There is no hemodynamically significant carotid stenosis as defined by NASCET criteria.      3. The Left internal carotid artery circulation reveals antegrade flow into the middle cerebral and anterior cerebral arteries. There is a patent anterior communicating artery. There is a patent posterior communicating artery. There is no evidence of aneurysm, AVM, or vascular malformation. The capillary and venous phases are unremarkable.     4. The Right common carotid circulation reveals antegrade flow into the internal and external carotid arteries. There is no hemodynamically significant carotid stenosis as defined by NASCET criteria.      5. The Right internal carotid artery circulation reveals antegrade flow into the middle cerebral and anterior cerebral arteries. There is a patent anterior communicating artery. There is a patent posterior communicating artery. There is no evidence of  aneurysm, AVM, or vascular malformation. The capillary and venous phases are unremarkable.     Impression:  Normal cerebral angiogram, no evidence of aneurysm.     Patient Disposition:  Critical Care Unit    SIGNATURE: Jeremy Mittal MD  DATE: 01/05/24   TIME: 1:30 PM

## 2024-01-05 NOTE — ED NOTES
Transfer information:    Accepted by Dr Marcos to [facility] ICU Bed 3  Report:  [phone]  Pickup TBA    Primary nurse updated     Mary Quinonez RN  01/05/24 0350

## 2024-01-05 NOTE — CONSULTS
Endocrinology Consultation  Mateus Mckeon 38 y.o. male MRN: [id number]  Unit/Bed#: ICU 03 Encounter: [id number]      Assessment/Plan     Assessment:  This is a 38 y.o.-year-old male with poorly controlled (based off of fructosamine) longstanding type 1 diabetes on OmniPod insulin pump therapy with multiple complications including ESRD on HD, gastroparesis, CVAs, retinopathy, neuropathy, who presented with headache and was found with a subarachnoid hemorrhage. Now post angiogram.     Plan:  -- Patient may remain on his insulin pump and his continuous glucose monitor and self manage his own blood glucose and insulin administration  --Replace pump every 3 days, replace sensor every 10 days, with own supplies  --Patient will fill new insulin pump every 3 days with hospital provided insulin, 300U, as needed  --Patient and family very motivated to maintain insulin pump in hospital.  Did discuss that it will have to temporarily come off if patient undergoes MRI, and if patient loses the capacity to manage his own pump for any reason, would anticipate rapid transition to an insulin infusion.  They are in understanding.  --Hypoglycemia protocol  --Will continue to follow and make adjustments as appropriate    CC: Diabetes Consult    History of Present Illness     HPI: Mateus Mckeon is a 38 y.o. year old male with type 1 diabetes for 35  years diagnosed age 1, on insulin pump therapy, with complication of ESRD on HD, retinopathy, neuropathy, gastroparesis, multiple CVAs. Recent A1c 6.0 in October however fructosamine 430 at the same time (indicating poor diabetic control) . Also with history of anxiety, depression, HTN,HLD, hypothyroid, RACHEAL.    Patient presented to ED with a headache, found with a subarachnoid hemorrhage.  Now s/p angiogram with neurosurgery this morning.    Patient follows with [facility] endocrinology, Dr. Myrick, last seen in office on 11/1/2023..  Documented issues with carb underestimation,  fear hypoglycemia.  Difficulty especially on dialysis days when not very hungry.    Patient has an OmniPod 5 with Dexcom G6 via phone carrol since March 2023. Runs in auto mode, so algorithm adjusts insulin administration based off blood sugars.   Programed settings  Basal rate:   MN-6A: 0.5u   6A-MN: 0.65u  Insulin to carb ratio:14  Insulin sensitivity factor:50  BG target:120  AIT: 4h  Type of insulin:Novolog    Omnipod applied pm of 1/3, expires 5pm 1/6.   G7 applied pm 12/30, expires 1/9  Mom has extra supplies   Ok using hospital insulin       Inpatient consult to Endocrinology  Consult performed by: Rosemary Rocha DO  Consult ordered by: DEISI Tony          Review of Systems   Unable to perform ROS: Acuity of condition   Gastrointestinal:  Positive for nausea.   Neurological:  Positive for headaches.       Historical Information   Past Medical History:   Diagnosis Date    Acute kidney injury (HCC)     Ambulates with cane     Anuria     Anxiety     Cellulitis of right elbow 03/31/2021    Chronic kidney disease     Depression     Diabetes mellitus (HCC)     Diarrhea     Emesis 10/24/2020    End stage renal disease (HCC) 02/11/2018    Formatting of this note might be different from the original. Last Assessment & Plan:  Secondary to DM.  On nightly PD.  Followed by Nephro.  [facility] Formatting of this note might be different from the original. Last Assessment & Plan:  Formatting of this note might be different from the original. 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    Esophagitis 07/21/2015    Falls     Gastroparesis     GERD (gastroesophageal reflux disease)     History of shingles 2010    History of transfusion 02/2018    no adverse reaction    Hyperlipidemia     Hyperphosphatemia     Hypertension     Hypoglycemia 07/15/2022    Itching     Mastoiditis of right side 07/15/2022    Muscle weakness      general unsteadiness    Obesity (BMI 30.0-34.9) 09/09/2019    Orthostatic hypotension 10/25/2020    Peripheral polyneuropathy 11/20/2019    PONV (postoperative nausea and vomiting) 01/26/2018    Protein-calorie malnutrition (HCC) 11/23/2020    Recurrent peritonitis (HCC) due to peritoneal dialysis catheter 07/31/2020    Retinopathy     Seizures (HCC)     early 2020 - one time    Skin abnormality     some dime size areas where skin was scratched from itching    Spontaneous bacterial peritonitis (HCC) 10/19/2020    Squamous cell skin cancer     left temple    Stroke (HCC)     x2 - off balance/no driving/fatigue    Swelling of both lower extremities     Traumatic onycholysis 07/21/2022    Vomiting     Wears glasses      Past Surgical History:   Procedure Laterality Date    CARDIAC ELECTROPHYSIOLOGY PROCEDURE N/A 9/21/2023    Procedure: Cardiac loop recorder explant;  Surgeon: Parish Morgan MD;  Location: [facility] CARDIAC CATH LAB;  Service: Cardiology    CARDIAC LOOP RECORDER  05/2018    COLONOSCOPY      EGD      EYE SURGERY Right     IR AV FISTULAGRAM/GRAFTOGRAM  02/23/2021    IR TUNNELED CENTRAL LINE PLACEMENT  02/16/2021    IR TUNNELED DIALYSIS CATHETER PLACEMENT  11/18/2020    IR TUNNELED DIALYSIS CATHETER REMOVAL  02/12/2021    IR TUNNELED DIALYSIS CATHETER REMOVAL  03/11/2021    MOHS SURGERY Left 12/14/2022    Left temple with Dr. Hassan    PERITONEAL CATHETER INSERTION N/A 08/27/2018    Procedure: UNROOF PD CATHETER;  Surgeon: Felipe Lindo DO;  Location: [facility] Main OR;  Service: General    TN ARTERIOVENOUS ANASTOMOSIS OPEN DIRECT Left 11/09/2020    Procedure: CREATION FISTULA  ARTERIOVENOUS (AV) - LEFT WRIST;  Surgeon: Placido Altamirano MD;  Location: [facility] Main OR;  Service: Vascular    TN ESOPHAGOGASTRODUODENOSCOPY TRANSORAL DIAGNOSTIC N/A 04/18/2019    Procedure: ESOPHAGOGASTRODUODENOSCOPY (EGD);  Surgeon: Ale Figueroa MD;  Location: [facility] GI LAB;  Service: Gastroenterology    TN LAPS INSERTION TUNNELED INTRAPERITONEAL  CATHETER N/A 2018    Procedure: LAPAROSCOPIC PD CATHETER PLACEMENT;  Surgeon: Felipe Lindo DO;  Location: [facility] Main OR;  Service: General    ND REMOVAL TUNNELED INTRAPERITONEAL CATHETER N/A 2020    Procedure: REMOVAL CATHETER PERITONEAL DIALYSIS;  Surgeon: Abdifatah Ty MD;  Location: [facility] Main OR;  Service: General    TONSILLECTOMY      UPPER GASTROINTESTINAL ENDOSCOPY       Social History   Social History     Substance and Sexual Activity   Alcohol Use Not Currently     Social History     Substance and Sexual Activity   Drug Use Yes    Types: Marijuana    Comment: medical marijuana     Social History     Tobacco Use   Smoking Status Former    Current packs/day: 0.00    Average packs/day: 0.5 packs/day for 12.0 years (6.0 ttl pk-yrs)    Types: Cigarettes    Start date: 2006    Quit date: 2018    Years since quittin.9   Smokeless Tobacco Never   Tobacco Comments    quit 2018     Family History:   Family History   Problem Relation Age of Onset    Breast cancer Mother     Hypertension Mother     Hyperlipidemia Father     Hypertension Father     Leukemia Maternal Grandmother     Hyperlipidemia Maternal Grandfather     Hypertension Maternal Grandfather     Hyperlipidemia Paternal Grandmother     Hypertension Paternal Grandmother     Heart disease Paternal Grandfather         cardiac disorder    Diabetes Paternal Grandfather        Meds/Allergies   Current Facility-Administered Medications   Medication Dose Route Frequency Provider Last Rate Last Admin    acetaminophen (TYLENOL) tablet 975 mg  975 mg Oral Q6H [facility] Radha Steen MD        atorvastatin (LIPITOR) tablet 40 mg  40 mg Oral QPM Juan Woodruff MD        bisacodyl (DULCOLAX) rectal suppository 10 mg  10 mg Rectal Daily PRN DEISI Tony        calcium acetate (PHOSLO) capsule 667 mg  667 mg Oral TID With Meals Juan Woodruff MD        chlorhexidine (PERIDEX) 0.12 % oral rinse 15 mL  15 mL Mouth/Throat Q12H HALIE DEISI Tony   15 mL at  01/05/24 0814    [START ON 1/6/2024] cinacalcet (SENSIPAR) tablet 60 mg  60 mg Oral Daily Tyler Anand, DO        cloNIDine (CATAPRES-TTS-3) 0.3 mg/24 hr TD weekly patch  0.3 mg Transdermal Weekly Andressa Marcos MD   0.3 mg at 01/05/24 0608    escitalopram (LEXAPRO) tablet 20 mg  20 mg Oral Daily Juan Woodruff MD        famotidine (PEPCID) tablet 40 mg  40 mg Oral Daily Juan Woodruff MD   40 mg at 01/05/24 1004    gabapentin (NEURONTIN) capsule 100 mg  100 mg Oral HS Juan Woodruff MD        hydrALAZINE (APRESOLINE) injection 10 mg  10 mg Intravenous Q6H [facility] Andressa Marcos MD   10 mg at 01/05/24 0604    hydrALAZINE (APRESOLINE) tablet 25 mg  25 mg Oral TID Juan Woodruff MD   25 mg at 01/05/24 1004    HYDROmorphone HCl (DILAUDID) injection 0.2 mg  0.2 mg Intravenous Q6H PRN DEISI Tony   0.2 mg at 01/05/24 0759    insulin lispro (HumaLOG) 100 units/mL subcutaneous injection 1-6 Units  1-6 Units Subcutaneous Q6H [facility] DEISI Tony        insulin lispro (HumaLOG) 100 units/mL subcutaneous injection 1-6 Units  1-6 Units Subcutaneous  DEISI Tony        labetalol (NORMODYNE) tablet 200 mg  200 mg Oral TID Juan Woodruff MD   200 mg at 01/05/24 1001    [START ON 1/6/2024] lisinopril (ZESTRIL) tablet 40 mg  40 mg Oral Daily Tyler Anand, DO        metoclopramide (REGLAN) injection 5 mg  5 mg Intravenous Q6H PRN Radha Steen MD        niCARdipine (CARDENE) 2.5 mg/mL injection **ADS Override Pull**             niCARdipine (CARDENE) 50 mg (DOUBLE CONCENTRATION) IV in sodium chloride 0.9% 250 mL  1-15 mg/hr Intravenous Titrated Andressa Marcos MD 75 mL/hr at 01/05/24 1121 15 mg/hr at 01/05/24 1121    NIFEdipine (PROCARDIA XL) 24 hr tablet 90 mg  90 mg Oral HS Juan Woodruff MD        niMODipine (NIMOTOP) capsule 60 mg  60 mg Oral Q4H [facility] Andressa Marcos MD        ondansetron (ZOFRAN) injection 4 mg  4 mg Intravenous Q4H Radha Steen MD         Allergies   Allergen Reactions    Sulfa  "Antibiotics Rash       Objective   Vitals: Blood pressure 151/67, pulse 88, temperature 99.1 °F (37.3 °C), temperature source Oral, resp. rate 18, height 5' 5\" (1.651 m), weight 95.3 kg (210 lb), SpO2 99%.    Intake/Output Summary (Last 24 hours) at 1/5/2024 1149  Last data filed at 1/5/2024 1000  Gross per 24 hour   Intake 460 ml   Output 0 ml   Net 460 ml     Invasive Devices       Peripheral Intravenous Line  Duration             Peripheral IV 01/04/24 Right Antecubital <1 day    Peripheral IV 01/05/24 Dorsal (posterior);Right Forearm <1 day    Peripheral IV 01/05/24 Right;Ventral (anterior) Forearm <1 day              Line  Duration             Peritoneal Dialysis Catheter Dalzell-neck/flanged collar Left lower abdomen 1977 days    Hemodialysis Access 11/02/20 Left Forearm 1159 days    Hemodialysis AV Fistula 11/09/20 Left Other (Comment) 1151 days                    Physical Exam  Constitutional:       General: He is not in acute distress.     Appearance: Normal appearance. He is obese. He is not ill-appearing, toxic-appearing or diaphoretic.   HENT:      Head: Normocephalic and atraumatic.      Nose: Nose normal.   Cardiovascular:      Rate and Rhythm: Normal rate.   Pulmonary:      Effort: Pulmonary effort is normal. No respiratory distress.   Abdominal:      General: There is no distension.   Musculoskeletal:         General: No deformity.      Right lower leg: No edema.      Left lower leg: No edema.      Comments: +LUE fistula   Skin:     General: Skin is warm and dry.   Neurological:      General: No focal deficit present.      Mental Status: He is alert.   Psychiatric:         Mood and Affect: Mood normal.         Behavior: Behavior normal.         Thought Content: Thought content normal.         The history was obtained from the review of the chart, patient and family.    Lab Results:       Lab Results   Component Value Date    WBC 8.46 01/04/2024    HGB 11.5 (L) 01/04/2024    HCT 34.3 (L) 01/04/2024    " "MCV 94 01/04/2024     01/04/2024     Lab Results   Component Value Date/Time    BUN 56 (H) 01/04/2024 10:43 PM    BUN 34 (H) 11/03/2015 11:32 AM     10/28/2015 05:43 PM    K 4.7 01/05/2024 05:48 AM    K 3.6 10/28/2015 05:43 PM    CL 91 (L) 01/04/2024 10:43 PM     10/28/2015 05:43 PM    CO2 27 01/04/2024 10:43 PM    CO2 24 02/21/2020 08:50 AM    CREATININE 13.49 (H) 01/04/2024 10:43 PM    CREATININE 1.90 (H) 11/03/2015 11:33 AM    AST 15 01/04/2024 10:43 PM    AST 10 10/28/2015 05:43 PM    ALT 10 01/04/2024 10:43 PM    ALT 25 10/28/2015 05:43 PM    TP 6.3 (L) 01/04/2024 10:43 PM    ALB 4.1 01/04/2024 10:43 PM    ALB 4.0 10/28/2015 05:43 PM     No results for input(s): \"CHOL\", \"HDL\", \"LDL\", \"TRIG\", \"VLDL\" in the last 72 hours.  No results found for: \"MICROALBUR\", \"EVVD97KPY\"  POC Glucose (mg/dl)   Date Value   01/05/2024 225 (H)   01/05/2024 196 (H)   11/27/2023 156 (H)   11/27/2023 155 (H)   11/27/2023 139   11/26/2023 358 (H)   11/26/2023 222 (H)   08/05/2022 164 (H)   08/05/2022 124   07/16/2022 215 (H)       Imaging Studies: I have personally reviewed pertinent reports.      Rosemary Rocha  Endocrinology PGY-4    Please Tigertext questions to the physician covering the \"[id number]\" Role. Thank you.   "

## 2024-01-05 NOTE — ASSESSMENT & PLAN NOTE
PPD 3 angio negative   Right basilar cistern SAH  BD 9, HH 2, MF 3.  P/w HA since last Sunday.  Hx ASA use for hx multiple strokes, reversed with DDAVP.  MRIs negative for source of SAH  On current exam, c/o headache and N, otherwise non-focal. GCS 15.     Imaging:  MRI brain wo 1/8/24:Stable hematoma in the right CP angle, prepontine and premedullary cisterns. New subarachnoid hemorrhage over the convexities and within the lateral ventricles likely due to redistribution.Few tiny scattered recent infarcts in multiple vascular distributions.  MRI cervical spine wo 1/8/24:No identifiable etiology for subarachnoid hemorrhage.Mild congenital canal stenosis.New marrow edema at C5-C6 with severe disc space narrowing that is similar to recent CT. Modic type I endplate degenerative changes favored over infection but recommend clinical correlation and follow-up imaging if warranted.    Plan:  Continue to monitor neuro exam closely.  STAT CTA with decline in GCS > 2 pts in 1 hour.  Reviewed imaging with patient and family this morning   Maintain SBP < 160 mmHg.  Will maintain on SAH pathway until proven otherwise:  Spasm watch  Daily TCDs: R 1.12, L 1.56  Continue Nimodipine 60mg Q4H  Normonatremia (today 135), euvolemia (+1,220/24H), Hgb >8 (today 10.3)   SBP<160 mmHg  Mobilize as tolerated.  DVT ppx: SCD's, hold pharmacologic DVT ppx.  Plan to likely repeat imaging/angiogram later this week pending patient progress     Neurosurgery will continue to follow. Call with questions.

## 2024-01-05 NOTE — TELEMEDICINE
"Neurosurgery e-Consult ([facility]C)     Mateus Mckeon 38 y.o. male MRN: [id number]  Unit/Bed#: ICU 03 Encounter: [id number]    Contacted by [facility] ED provider Vanessa Finn  Reason for consult: SAH    Per provider report, patient has h/o ESRD, HTN, prior stroke in 2015 and 2018 on ASA who p/w severe headache since last Sunday a/w nausea and vomiting. CTH showed SAH in right basal cisterns without IVH.    Available past medical history, social history, surgical history, medication list, drug allergies and review of systems were reviewed.    /62   Pulse 86   Temp 97.9 °F (36.6 °C) (Oral)   Resp 22   Ht 5' 5\" (1.651 m)   Wt 95.3 kg (210 lb)   SpO2 98%   BMI 34.95 kg/m²      Also per report, exam is non-focal without neurologic deficits.    Assessment and Recommendations    1. SBP extremely elevated > 200s, must be maintained < 140 strict, start Cardene gtt if indicated  2. CTA pending  3. Transfer emergently to [facility], discussed with NCC[facility] attending Dr. Marcos  4. Will need further work-up with angiogram and proceed with SAH pathway    All questions and concerns were addressed. Provider is in agreement with the course of action.     31+ minutes were spent in clinical decision making and direct communication, >50% of the total time devoted to medical consultative verbal/EMR discussion between providers.      Magan Berry M.D.  Neurosurgeon On Call          "

## 2024-01-05 NOTE — RESPIRATORY THERAPY NOTE
RT Protocol Note  Mateus Mckeon 38 y.o. male MRN: [id number]  Unit/Bed#: ICU 03 Encounter: [id number]    Assessment    Active Problems:  There are no active Hospital Problems.      Home Pulmonary Medications:  N/A       Past Medical History:   Diagnosis Date    Acute kidney injury (HCC)     Ambulates with cane     Anuria     Anxiety     Cellulitis of right elbow 03/31/2021    Chronic kidney disease     Depression     Diabetes mellitus (HCC)     Diarrhea     Emesis 10/24/2020    End stage renal disease (HCC) 02/11/2018    Formatting of this note might be different from the original. Last Assessment & Plan:  Secondary to DM.  On nightly PD.  Followed by Nephro.  Patient considering transplant for kidney and pancreas through [facility] Formatting of this note might be different from the original. Last Assessment & Plan:  Formatting of this note might be different from the original. 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    Esophagitis 07/21/2015    Falls     Gastroparesis     GERD (gastroesophageal reflux disease)     History of shingles 2010    History of transfusion 02/2018    no adverse reaction    Hyperlipidemia     Hyperphosphatemia     Hypertension     Hypoglycemia 07/15/2022    Itching     Mastoiditis of right side 07/15/2022    Muscle weakness     general unsteadiness    Obesity (BMI 30.0-34.9) 09/09/2019    Orthostatic hypotension 10/25/2020    Peripheral polyneuropathy 11/20/2019    PONV (postoperative nausea and vomiting) 01/26/2018    Protein-calorie malnutrition (HCC) 11/23/2020    Recurrent peritonitis (HCC) due to peritoneal dialysis catheter 07/31/2020    Retinopathy     Seizures (HCC)     early 2020 - one time    Skin abnormality     some dime size areas where skin was scratched from itching    Spontaneous bacterial peritonitis (HCC) 10/19/2020    Squamous cell skin cancer     left temple    Stroke (HCC)     x2 - off balance/no driving/fatigue    Swelling of  both lower extremities     Traumatic onycholysis 2022    Vomiting     Wears glasses      Social History     Socioeconomic History    Marital status: Single     Spouse name: Not on file    Number of children: Not on file    Years of education: Not on file    Highest education level: Not on file   Occupational History    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   Tobacco Use    Smoking status: Former     Current packs/day: 0.00     Average packs/day: 0.5 packs/day for 12.0 years (6.0 ttl pk-yrs)     Types: Cigarettes     Start date: 2006     Quit date: 2018     Years since quittin.9    Smokeless tobacco: Never    Tobacco comments:     quit 2018   Vaping Use    Vaping status: Every Day    Substances: THC, CBD   Substance and Sexual Activity    Alcohol use: Not Currently    Drug use: Yes     Types: Marijuana     Comment: medical marijuana    Sexual activity: Not on file   Other Topics Concern    Not on file   Social History Narrative    Caffeine use    single     Social Determinants of Health     Financial Resource Strain: Low Risk  (2023)    Overall Financial Resource Strain (CARDIA)     Difficulty of Paying Living Expenses: Not hard at all   Food Insecurity: Not on file   Transportation Needs: No Transportation Needs (2023)    PRAPARE - Transportation     Lack of Transportation (Medical): No     Lack of Transportation (Non-Medical): No   Physical Activity: Not on file   Stress: Not on file   Social Connections: Not on file   Intimate Partner Violence: Not on file   Housing Stability: Not on file       Subjective         Objective    Physical Exam:   Assessment Type: Assess only  General Appearance: Awake, Alert  Respiratory Pattern: Normal  Chest Assessment: Chest expansion symmetrical  Bilateral Breath Sounds: Clear  Cough: None  O2 Device: RA    Vitals:  SpO2 (P) 99%.          Imaging and other studies:     O2 Device: RA     Plan    Respiratory Plan: No distress/Pulmonary  history, Discontinue Protocol  Airway Clearance Plan: Incentive Spirometer, Discontinue Protocol     Resp Comments: Pt. evaluated at bedside per Respiratory/Airway Clearance protocols.  Pt. admitted with SAH.  Pt's breath sounds are clear bilaterally and he is in no distress at this time.  Pt. denies any Pulmonary Hx. and bronchodilators are not indicated at this time.  Pt. instructed on IS and demonstrated use of the device very well.  Will D/C Respiratory/Airway Clearance protocols at this time.

## 2024-01-05 NOTE — PLAN OF CARE
Problem: PAIN - ADULT  Goal: Verbalizes/displays adequate comfort level or baseline comfort level  Description: Interventions:  - Encourage patient to monitor pain and request assistance  - Assess pain using appropriate pain scale  - Administer analgesics based on type and severity of pain and evaluate response  - Implement non-pharmacological measures as appropriate and evaluate response  - Consider cultural and social influences on pain and pain management  - Notify physician/advanced practitioner if interventions unsuccessful or patient reports new pain  Outcome: Progressing     Problem: INFECTION - ADULT  Goal: Absence or prevention of progression during hospitalization  Description: INTERVENTIONS:  - Assess and monitor for signs and symptoms of infection  - Monitor lab/diagnostic results  - Monitor all insertion sites, i.e. indwelling lines, tubes, and drains  - Monitor endotracheal if appropriate and nasal secretions for changes in amount and color  - [place] appropriate cooling/warming therapies per order  - Administer medications as ordered  - Instruct and encourage patient and family to use good hand hygiene technique  - Identify and instruct in appropriate isolation precautions for identified infection/condition  Outcome: Progressing  Goal: Absence of fever/infection during neutropenic period  Description: INTERVENTIONS:  - Monitor WBC    Outcome: Progressing     Problem: SAFETY ADULT  Goal: Patient will remain free of falls  Description: INTERVENTIONS:  - Educate patient/family on patient safety including physical limitations  - Instruct patient to call for assistance with activity   - Consult OT/PT to assist with strengthening/mobility   - Keep Call bell within reach  - Keep bed low and locked with side rails adjusted as appropriate  - Keep care items and personal belongings within reach  - Initiate and maintain comfort rounds  - Make Fall Risk Sign visible to staff  - Apply yellow socks and bracelet  for high fall risk patients  - Consider moving patient to room near nurses station  Outcome: Progressing  Goal: Maintain or return to baseline ADL function  Description: INTERVENTIONS:  -  Assess patient's ability to carry out ADLs; assess patient's baseline for ADL function and identify physical deficits which impact ability to perform ADLs (bathing, care of mouth/teeth, toileting, grooming, dressing, etc.)  - Assess/evaluate cause of self-care deficits   - Assess range of motion  - Assess patient's mobility; develop plan if impaired  - Assess patient's need for assistive devices and provide as appropriate  - Encourage maximum independence but intervene and supervise when necessary  - Involve family in performance of ADLs  - Assess for home care needs following discharge   - Consider OT consult to assist with ADL evaluation and planning for discharge  - Provide patient education as appropriate  Outcome: Progressing  Goal: Maintains/Returns to pre admission functional level  Description: INTERVENTIONS:  - Perform AM-PAC 6 Click Basic Mobility/ Daily Activity assessment daily.  - Set and communicate daily mobility goal to care team and patient/family/caregiver.   - Collaborate with rehabilitation services on mobility goals if consulted  - Out of bed for toileting  - Record patient progress and toleration of activity level   Outcome: Progressing     Problem: DISCHARGE PLANNING  Goal: Discharge to home or other facility with appropriate resources  Description: INTERVENTIONS:  - Identify barriers to discharge w/patient and caregiver  - Arrange for needed discharge resources and transportation as appropriate  - Identify discharge learning needs (meds, wound care, etc.)  - Arrange for interpretive services to assist at discharge as needed  - Refer to Case Management Department for coordinating discharge planning if the patient needs post-hospital services based on physician/advanced practitioner order or complex needs  related to functional status, cognitive ability, or social support system  Outcome: Progressing     Problem: Knowledge Deficit  Goal: Patient/family/caregiver demonstrates understanding of disease process, treatment plan, medications, and discharge instructions  Description: Complete learning assessment and assess knowledge base.  Interventions:  - Provide teaching at level of understanding  - Provide teaching via preferred learning methods  Outcome: Progressing

## 2024-01-06 ENCOUNTER — APPOINTMENT (INPATIENT)
Dept: NON INVASIVE DIAGNOSTICS | Facility: HOSPITAL | Age: 39
DRG: 064 | End: 2024-01-06
Payer: MEDICARE

## 2024-01-06 LAB
ANION GAP SERPL CALCULATED.3IONS-SCNC: 15 MMOL/L
ATRIAL RATE: 81 BPM
BASOPHILS # BLD AUTO: 0.02 THOUSANDS/ÂΜL (ref 0–0.1)
BASOPHILS NFR BLD AUTO: 0 % (ref 0–1)
BUN SERPL-MCNC: 38 MG/DL (ref 5–25)
CALCIUM SERPL-MCNC: 9.1 MG/DL (ref 8.4–10.2)
CHLORIDE SERPL-SCNC: 95 MMOL/L (ref 96–108)
CO2 SERPL-SCNC: 26 MMOL/L (ref 21–32)
CREAT SERPL-MCNC: 10.27 MG/DL (ref 0.6–1.3)
EOSINOPHIL # BLD AUTO: 0 THOUSAND/ÂΜL (ref 0–0.61)
EOSINOPHIL NFR BLD AUTO: 0 % (ref 0–6)
ERYTHROCYTE [DISTWIDTH] IN BLOOD BY AUTOMATED COUNT: 14.4 % (ref 11.6–15.1)
GFR SERPL CREATININE-BSD FRML MDRD: 5 ML/MIN/1.73SQ M
GLUCOSE SERPL-MCNC: 162 MG/DL (ref 65–140)
GLUCOSE SERPL-MCNC: 174 MG/DL (ref 65–140)
GLUCOSE SERPL-MCNC: 190 MG/DL (ref 65–140)
GLUCOSE SERPL-MCNC: 222 MG/DL (ref 65–140)
GLUCOSE SERPL-MCNC: 225 MG/DL (ref 65–140)
HCT VFR BLD AUTO: 29.2 % (ref 36.5–49.3)
HGB BLD-MCNC: 9.6 G/DL (ref 12–17)
IMM GRANULOCYTES # BLD AUTO: 0.06 THOUSAND/UL (ref 0–0.2)
IMM GRANULOCYTES NFR BLD AUTO: 1 % (ref 0–2)
LYMPHOCYTES # BLD AUTO: 1.03 THOUSANDS/ÂΜL (ref 0.6–4.47)
LYMPHOCYTES NFR BLD AUTO: 10 % (ref 14–44)
MAGNESIUM SERPL-MCNC: 2.2 MG/DL (ref 1.9–2.7)
MCH RBC QN AUTO: 31.4 PG (ref 26.8–34.3)
MCHC RBC AUTO-ENTMCNC: 32.9 G/DL (ref 31.4–37.4)
MCV RBC AUTO: 95 FL (ref 82–98)
MONOCYTES # BLD AUTO: 0.7 THOUSAND/ÂΜL (ref 0.17–1.22)
MONOCYTES NFR BLD AUTO: 7 % (ref 4–12)
NEUTROPHILS # BLD AUTO: 8.44 THOUSANDS/ÂΜL (ref 1.85–7.62)
NEUTS SEG NFR BLD AUTO: 82 % (ref 43–75)
NRBC BLD AUTO-RTO: 0 /100 WBCS
P AXIS: 60 DEGREES
PHOSPHATE SERPL-MCNC: 6.1 MG/DL (ref 2.7–4.5)
PLATELET # BLD AUTO: 208 THOUSANDS/UL (ref 149–390)
PMV BLD AUTO: 10.9 FL (ref 8.9–12.7)
POTASSIUM SERPL-SCNC: 4.9 MMOL/L (ref 3.5–5.3)
PR INTERVAL: 163 MS
QRS AXIS: 59 DEGREES
QRSD INTERVAL: 75 MS
QT INTERVAL: 379 MS
QTC INTERVAL: 440 MS
RBC # BLD AUTO: 3.06 MILLION/UL (ref 3.88–5.62)
SODIUM SERPL-SCNC: 136 MMOL/L (ref 135–147)
T WAVE AXIS: 201 DEGREES
VENTRICULAR RATE: 81 BPM
WBC # BLD AUTO: 10.25 THOUSAND/UL (ref 4.31–10.16)

## 2024-01-06 PROCEDURE — 85025 COMPLETE CBC W/AUTO DIFF WBC: CPT

## 2024-01-06 PROCEDURE — 93886 INTRACRANIAL COMPLETE STUDY: CPT

## 2024-01-06 PROCEDURE — 99232 SBSQ HOSP IP/OBS MODERATE 35: CPT | Performed by: INTERNAL MEDICINE

## 2024-01-06 PROCEDURE — 84100 ASSAY OF PHOSPHORUS: CPT

## 2024-01-06 PROCEDURE — 82948 REAGENT STRIP/BLOOD GLUCOSE: CPT

## 2024-01-06 PROCEDURE — 99232 SBSQ HOSP IP/OBS MODERATE 35: CPT | Performed by: STUDENT IN AN ORGANIZED HEALTH CARE EDUCATION/TRAINING PROGRAM

## 2024-01-06 PROCEDURE — 83735 ASSAY OF MAGNESIUM: CPT

## 2024-01-06 PROCEDURE — 93005 ELECTROCARDIOGRAM TRACING: CPT

## 2024-01-06 PROCEDURE — 99233 SBSQ HOSP IP/OBS HIGH 50: CPT | Performed by: SURGERY

## 2024-01-06 PROCEDURE — 80048 BASIC METABOLIC PNL TOTAL CA: CPT

## 2024-01-06 RX ORDER — PROCHLORPERAZINE MALEATE 5 MG/1
5 TABLET ORAL EVERY 6 HOURS PRN
Status: DISCONTINUED | OUTPATIENT
Start: 2024-01-06 | End: 2024-01-18

## 2024-01-06 RX ORDER — METOCLOPRAMIDE HYDROCHLORIDE 5 MG/ML
10 INJECTION INTRAMUSCULAR; INTRAVENOUS EVERY 6 HOURS PRN
Status: DISCONTINUED | OUTPATIENT
Start: 2024-01-06 | End: 2024-01-11

## 2024-01-06 RX ORDER — LABETALOL 200 MG/1
400 TABLET, FILM COATED ORAL 3 TIMES DAILY
Status: DISCONTINUED | OUTPATIENT
Start: 2024-01-06 | End: 2024-01-10

## 2024-01-06 RX ADMIN — ONDANSETRON 4 MG: 2 INJECTION INTRAMUSCULAR; INTRAVENOUS at 00:24

## 2024-01-06 RX ADMIN — NIMODIPINE 60 MG: 30 CAPSULE, LIQUID FILLED ORAL at 00:24

## 2024-01-06 RX ADMIN — CINACALCET 60 MG: 30 TABLET ORAL at 08:08

## 2024-01-06 RX ADMIN — CHLORHEXIDINE GLUCONATE 15 ML: 1.2 SOLUTION ORAL at 08:07

## 2024-01-06 RX ADMIN — NIMODIPINE 60 MG: 30 CAPSULE, LIQUID FILLED ORAL at 08:07

## 2024-01-06 RX ADMIN — CALCIUM ACETATE 667 MG: 667 CAPSULE ORAL at 15:46

## 2024-01-06 RX ADMIN — NIFEDIPINE 90 MG: 30 TABLET, FILM COATED, EXTENDED RELEASE ORAL at 21:43

## 2024-01-06 RX ADMIN — ACETAMINOPHEN 975 MG: 325 TABLET, FILM COATED ORAL at 23:53

## 2024-01-06 RX ADMIN — NIMODIPINE 60 MG: 30 CAPSULE, LIQUID FILLED ORAL at 20:04

## 2024-01-06 RX ADMIN — ESCITALOPRAM OXALATE 20 MG: 20 TABLET ORAL at 08:08

## 2024-01-06 RX ADMIN — HYDRALAZINE HYDROCHLORIDE 25 MG: 25 TABLET, FILM COATED ORAL at 15:45

## 2024-01-06 RX ADMIN — NICARDIPINE HYDROCHLORIDE 10 MG/HR: 2.5 INJECTION, SOLUTION INTRAVENOUS at 00:28

## 2024-01-06 RX ADMIN — METOCLOPRAMIDE HYDROCHLORIDE 10 MG: 5 INJECTION INTRAMUSCULAR; INTRAVENOUS at 16:14

## 2024-01-06 RX ADMIN — ACETAMINOPHEN 975 MG: 325 TABLET, FILM COATED ORAL at 00:24

## 2024-01-06 RX ADMIN — NICARDIPINE HYDROCHLORIDE 7.5 MG/HR: 2.5 INJECTION, SOLUTION INTRAVENOUS at 04:03

## 2024-01-06 RX ADMIN — LABETALOL HYDROCHLORIDE 400 MG: 200 TABLET, FILM COATED ORAL at 15:45

## 2024-01-06 RX ADMIN — NIMODIPINE 60 MG: 30 CAPSULE, LIQUID FILLED ORAL at 15:45

## 2024-01-06 RX ADMIN — ATORVASTATIN CALCIUM 40 MG: 40 TABLET, FILM COATED ORAL at 18:00

## 2024-01-06 RX ADMIN — LABETALOL HYDROCHLORIDE 400 MG: 200 TABLET, FILM COATED ORAL at 21:43

## 2024-01-06 RX ADMIN — LISINOPRIL 40 MG: 20 TABLET ORAL at 08:07

## 2024-01-06 RX ADMIN — ONDANSETRON 4 MG: 2 INJECTION INTRAMUSCULAR; INTRAVENOUS at 22:34

## 2024-01-06 RX ADMIN — NIMODIPINE 60 MG: 30 CAPSULE, LIQUID FILLED ORAL at 04:03

## 2024-01-06 RX ADMIN — NIMODIPINE 60 MG: 30 CAPSULE, LIQUID FILLED ORAL at 12:08

## 2024-01-06 RX ADMIN — FAMOTIDINE 40 MG: 20 TABLET, FILM COATED ORAL at 08:07

## 2024-01-06 RX ADMIN — ACETAMINOPHEN 975 MG: 325 TABLET, FILM COATED ORAL at 05:23

## 2024-01-06 RX ADMIN — GABAPENTIN 100 MG: 100 CAPSULE ORAL at 21:43

## 2024-01-06 RX ADMIN — CHLORHEXIDINE GLUCONATE 15 ML: 1.2 SOLUTION ORAL at 21:43

## 2024-01-06 RX ADMIN — HYDRALAZINE HYDROCHLORIDE 25 MG: 25 TABLET, FILM COATED ORAL at 21:43

## 2024-01-06 RX ADMIN — ONDANSETRON 4 MG: 2 INJECTION INTRAMUSCULAR; INTRAVENOUS at 08:44

## 2024-01-06 RX ADMIN — LABETALOL HYDROCHLORIDE 200 MG: 200 TABLET, FILM COATED ORAL at 08:07

## 2024-01-06 RX ADMIN — HYDROMORPHONE HYDROCHLORIDE 0.2 MG: 0.2 INJECTION, SOLUTION INTRAMUSCULAR; INTRAVENOUS; SUBCUTANEOUS at 16:12

## 2024-01-06 RX ADMIN — PROCHLORPERAZINE MALEATE 5 MG: 5 TABLET ORAL at 12:17

## 2024-01-06 RX ADMIN — NICARDIPINE HYDROCHLORIDE 5 MG/HR: 2.5 INJECTION, SOLUTION INTRAVENOUS at 08:55

## 2024-01-06 RX ADMIN — HYDROMORPHONE HYDROCHLORIDE 0.2 MG: 0.2 INJECTION, SOLUTION INTRAMUSCULAR; INTRAVENOUS; SUBCUTANEOUS at 08:44

## 2024-01-06 RX ADMIN — HYDRALAZINE HYDROCHLORIDE 25 MG: 25 TABLET, FILM COATED ORAL at 08:07

## 2024-01-06 RX ADMIN — ACETAMINOPHEN 975 MG: 325 TABLET, FILM COATED ORAL at 12:08

## 2024-01-06 RX ADMIN — NIMODIPINE 60 MG: 30 CAPSULE, LIQUID FILLED ORAL at 23:53

## 2024-01-06 RX ADMIN — CALCIUM ACETATE 667 MG: 667 CAPSULE ORAL at 12:12

## 2024-01-06 NOTE — PLAN OF CARE
Problem: PAIN - ADULT  Goal: Verbalizes/displays adequate comfort level or baseline comfort level  Description: Interventions:  - Encourage patient to monitor pain and request assistance  - Assess pain using appropriate pain scale  - Administer analgesics based on type and severity of pain and evaluate response  - Implement non-pharmacological measures as appropriate and evaluate response  - Consider cultural and social influences on pain and pain management  - Notify physician/advanced practitioner if interventions unsuccessful or patient reports new pain  Outcome: Progressing     Problem: INFECTION - ADULT  Goal: Absence or prevention of progression during hospitalization  Description: INTERVENTIONS:  - Assess and monitor for signs and symptoms of infection  - Monitor lab/diagnostic results  - Monitor all insertion sites, i.e. indwelling lines, tubes, and drains  - Monitor endotracheal if appropriate and nasal secretions for changes in amount and color  - [place] appropriate cooling/warming therapies per order  - Administer medications as ordered  - Instruct and encourage patient and family to use good hand hygiene technique  - Identify and instruct in appropriate isolation precautions for identified infection/condition  Outcome: Progressing  Goal: Absence of fever/infection during neutropenic period  Description: INTERVENTIONS:  - Monitor WBC    Outcome: Progressing     Problem: SAFETY ADULT  Goal: Patient will remain free of falls  Description: INTERVENTIONS:  - Educate patient/family on patient safety including physical limitations  - Instruct patient to call for assistance with activity   - Consult OT/PT to assist with strengthening/mobility   - Keep Call bell within reach  - Keep bed low and locked with side rails adjusted as appropriate  - Keep care items and personal belongings within reach  - Initiate and maintain comfort rounds  - Make Fall Risk Sign visible to staff  - Offer Toileting every  Hours,  in advance of need  - Initiate/Maintain alarm  - Obtain necessary fall risk management equipment:   - Apply yellow socks and bracelet for high fall risk patients  - Consider moving patient to room near nurses station  Outcome: Progressing  Goal: Maintain or return to baseline ADL function  Description: INTERVENTIONS:  -  Assess patient's ability to carry out ADLs; assess patient's baseline for ADL function and identify physical deficits which impact ability to perform ADLs (bathing, care of mouth/teeth, toileting, grooming, dressing, etc.)  - Assess/evaluate cause of self-care deficits   - Assess range of motion  - Assess patient's mobility; develop plan if impaired  - Assess patient's need for assistive devices and provide as appropriate  - Encourage maximum independence but intervene and supervise when necessary  - Involve family in performance of ADLs  - Assess for home care needs following discharge   - Consider OT consult to assist with ADL evaluation and planning for discharge  - Provide patient education as appropriate  Outcome: Progressing  Goal: Maintains/Returns to pre admission functional level  Description: INTERVENTIONS:  - Perform AM-PAC 6 Click Basic Mobility/ Daily Activity assessment daily.  - Set and communicate daily mobility goal to care team and patient/family/caregiver.   - Collaborate with rehabilitation services on mobility goals if consulted  - Perform Range of Motion 3 times a day.  - Reposition patient every 2 hours.  - Dangle patient 3 times a day  - Stand patient 3 times a day  - Ambulate patient 3 times a day  - Out of bed to chair 3 times a day   - Out of bed for meals 3 times a day  - Out of bed for toileting  - Record patient progress and toleration of activity level   Outcome: Progressing     Problem: DISCHARGE PLANNING  Goal: Discharge to home or other facility with appropriate resources  Description: INTERVENTIONS:  - Identify barriers to discharge w/patient and caregiver  -  Arrange for needed discharge resources and transportation as appropriate  - Identify discharge learning needs (meds, wound care, etc.)  - Arrange for interpretive services to assist at discharge as needed  - Refer to Case Management Department for coordinating discharge planning if the patient needs post-hospital services based on physician/advanced practitioner order or complex needs related to functional status, cognitive ability, or social support system  Outcome: Progressing     Problem: Knowledge Deficit  Goal: Patient/family/caregiver demonstrates understanding of disease process, treatment plan, medications, and discharge instructions  Description: Complete learning assessment and assess knowledge base.  Interventions:  - Provide teaching at level of understanding  - Provide teaching via preferred learning methods  Outcome: Progressing     Problem: Nutrition/Hydration-ADULT  Goal: Nutrient/Hydration intake appropriate for improving, restoring or maintaining nutritional needs  Description: Monitor and assess patient's nutrition/hydration status for malnutrition. Collaborate with interdisciplinary team and initiate plan and interventions as ordered.  Monitor patient's weight and dietary intake as ordered or per policy. Utilize nutrition screening tool and intervene as necessary. Determine patient's food preferences and provide high-protein, high-caloric foods as appropriate.     INTERVENTIONS:  - Monitor oral intake, urinary output, labs, and treatment plans  - Assess nutrition and hydration status and recommend course of action  - Evaluate amount of meals eaten  - Assist patient with eating if necessary   - Allow adequate time for meals  - Recommend/ encourage appropriate diets, oral nutritional supplements, and vitamin/mineral supplements  - Order, calculate, and assess calorie counts as needed  - Recommend, monitor, and adjust tube feedings and TPN/PPN based on assessed needs  - Assess need for intravenous  fluids  - Provide specific nutrition/hydration education as appropriate  - Include patient/family/caregiver in decisions related to nutrition  Outcome: Progressing     Problem: METABOLIC, FLUID AND ELECTROLYTES - ADULT  Goal: Electrolytes maintained within normal limits  Description: INTERVENTIONS:  - Monitor labs and assess patient for signs and symptoms of electrolyte imbalances  - Administer electrolyte replacement as ordered  - Monitor response to electrolyte replacements, including repeat lab results as appropriate  - Instruct patient on fluid and nutrition as appropriate  Outcome: Progressing  Goal: Fluid balance maintained  Description: INTERVENTIONS:  - Monitor labs   - Monitor I/O and WT  - Instruct patient on fluid and nutrition as appropriate  - Assess for signs & symptoms of volume excess or deficit  Outcome: Progressing

## 2024-01-06 NOTE — PROGRESS NOTES
"Progress Note - Neurosurgery   Mateus Mckeon 38 y.o. male MRN: [id number]  Unit/Bed#: ICU 03 Encounter: [id number]    Assessment/Plan:    Mateus Mckeon is a 38 year old male presenting with headache and basilar cistern SAH consistent with perimesencephalic type bleed.    - Right basilar cistern SAH - BD 7, HH 2, MF 3.  Continue to monitor neuro exam closely.  MRI brain and C spine pending  STAT CTA with decline in GCS > 2 pts in 1 hour.  Angiogram 1/5/24 complete without vascular abnormality  Maintain SBP < 160 mmHg.  Will maintain on SAH pathway until proven otherwise:  Spasm watch  Daily TCDs: Pending  Continue Nimodipine 60mg Q4H  Normonatremia (today 136), euvolemia (+150/24H), Hgb >8 (today 11.5)   SBP<160 mmHg  DVT PPX: SCDs.  Mobilize as tolerated.  DVT ppx: SCD's, hold pharmacologic DVT ppx.    Subjective/Objective   Chief Complaint: Reporting nausea and headache, otherwise non-focal    Vitals: Blood pressure 122/71, pulse 80, temperature 99.4 °F (37.4 °C), temperature source Oral, resp. rate 21, height 5' 5\" (1.651 m), weight 95.3 kg (210 lb), SpO2 98%.,Body mass index is 34.95 kg/m².      Physical Exam:   Physical Exam  Neurologic Exam  Awakens to voice  Oriented, appropriate  PERRL  EOMI  5/5 throughout    Invasive Devices       Peripheral Intravenous Line  Duration             Peripheral IV 01/04/24 Right Antecubital 1 day    Peripheral IV 01/05/24 Dorsal (posterior);Right Forearm 1 day    Peripheral IV 01/05/24 Right;Ventral (anterior) Forearm 1 day              Line  Duration             Peritoneal Dialysis Catheter [place]-neck/flanged collar Left lower abdomen 1978 days    Hemodialysis Access 11/02/20 Left Forearm 1160 days    Hemodialysis AV Fistula 11/09/20 Left Other (Comment) 1152 days                          Lab Results: I have personally reviewed pertinent results.      Imaging Studies: I have personally reviewed pertinent reports.      "

## 2024-01-06 NOTE — SPEECH THERAPY NOTE
Attempted to see pt for dysphagia eval this morning, however pt has having other testing in his room. ST to follow up for assessment as able.     Addendum: returned at 14:34 to see pt. Discussed with RN; pt has been nauseous and is sleeping. He did tolerate meds whole with water and some crackers earlier today. ST to follow up and assess as able and appropriate.

## 2024-01-06 NOTE — PLAN OF CARE
Problem: Knowledge Deficit  Goal: Patient/family/caregiver demonstrates understanding of disease process, treatment plan, medications, and discharge instructions  Description: Complete learning assessment and assess knowledge base.  Interventions:  - Provide teaching at level of understanding  - Provide teaching via preferred learning methods  Outcome: Progressing     Problem: Nutrition/Hydration-ADULT  Goal: Nutrient/Hydration intake appropriate for improving, restoring or maintaining nutritional needs  Description: Monitor and assess patient's nutrition/hydration status for malnutrition. Collaborate with interdisciplinary team and initiate plan and interventions as ordered.  Monitor patient's weight and dietary intake as ordered or per policy. Utilize nutrition screening tool and intervene as necessary. Determine patient's food preferences and provide high-protein, high-caloric foods as appropriate.     INTERVENTIONS:  - Monitor oral intake, urinary output, labs, and treatment plans  - Assess nutrition and hydration status and recommend course of action  - Evaluate amount of meals eaten  - Assist patient with eating if necessary   - Allow adequate time for meals  - Recommend/ encourage appropriate diets, oral nutritional supplements, and vitamin/mineral supplements  - Order, calculate, and assess calorie counts as needed  - Recommend, monitor, and adjust tube feedings and TPN/PPN based on assessed needs  - Assess need for intravenous fluids  - Provide specific nutrition/hydration education as appropriate  - Include patient/family/caregiver in decisions related to nutrition  Outcome: Progressing     Problem: METABOLIC, FLUID AND ELECTROLYTES - ADULT  Goal: Electrolytes maintained within normal limits  Description: INTERVENTIONS:  - Monitor labs and assess patient for signs and symptoms of electrolyte imbalances  - Administer electrolyte replacement as ordered  - Monitor response to electrolyte replacements,  including repeat lab results as appropriate  - Instruct patient on fluid and nutrition as appropriate  Outcome: Progressing

## 2024-01-06 NOTE — PROGRESS NOTES
[facility]  Progress Note: Critical Care  Name: Mateus Mckeon 38 y.o. male I MRN: [id number]  Unit/Bed#: ICU 03 I Date of Admission: 1/5/2024   Date of Service: 1/6/2024 I Hospital Day: 1    Assessment/Plan   Neuro:   Diagnosis: Subarachnoid hemorrhage, R basilar cistern   CTA H/N 1/5: Small amount of subarachnoid hemorrhage in the right basilar cistern region is again seen without significant change since the prior CT brain exam of the same day   CTH 1/5: Right prepontine/premedullary cistern subarachnoid hemorrhage.Minimal mass effect on the right middle cerebellar peduncle.   CT angiogram 1/5: No evidence of aneurysm   Plan:   Q2 neuro exams  SBP<160  SAH pathway  N/V: Zofran PRN, consider compazine if refractory   Spasm watch  Daily TCDs  Nimodipine 60 q4  Hold chemical dvtpx  Appreciate Neurosurgery recs     CV:   Diagnosis: HLD  Lipitor 40  Diagnosis: HTN  Plan: Home meds: Clonidine .3, hydralazine 25 TID, labetolol 200 TID, lisinopril 40, Nifedipine 90,   Cardene drip, titrated to BP goal < 160  Increase home labetalol to 400 TID     Pulm:  No active issues    GI:   Diagnosis: GERD  Plan: Famotidine 40    :   Diagnosis: ESRD on HD MWF  Plan: Dialysis post MRI x 3 days, will schedule MRI for 1/8  Appreciate Nephrology recs     F/E/N:   F:None   E: Replete per nephro recs; on Veltassa 5 on non-dialysis days for hyperkalemia   N: Renal diet    Heme/Onc:   Diagnosis: DVTpx  Plan: Hold chemical ppx per NSGY     Endo:   Diagnosis: DM1  Plan: Continue home insulin pump, supplies per family   Appreciate Endo recs   Diagnosis: Secondary Hyperparathyroidism  Plan: Appreciate nephro recs: Increase Cinacalcet from 60 (home dose) to 120  Diagnosis: Hyperphosphatemia  Plan: Calcium acetate while inpatient, on Velphoro at home      ID:   No active issues    MSK/Skin:   No active issues    Disposition: Critical care    ICU Core Measures     A: Assess, Prevent, and Manage Pain  Has pain been assessed? Yes  Need for changes to pain regimen? No   B: Both SAT/SAT  N/A   C: Choice of Sedation RASS Goal: N/A patient not on sedation  Need for changes to sedation or analgesia regimen? NA   D: Delirium CAM-ICU: Negative   E: Early Mobility  Plan for early mobility? Yes   F: Family Engagement Plan for family engagement today? Yes         Prophylaxis:  VTE Contraindicated secondary to: SAH   Stress Ulcer  covered byfamotidine (PEPCID) 40 MG tablet [[id number]] (Long-Term Med), famotidine (PEPCID) tablet 40 mg [[id number]]        Significant 24hr Events     24hr events: NAEO. Pt unable to go for MRI overnight 2/2 severe nausea     Subjective  Pt resting comfortably, complains of minor headache. Nausea resolved. No new complaints.     Review of Systems   Gastrointestinal:  Positive for nausea.   Neurological:  Positive for headaches.   All other systems reviewed and are negative.       Objective                            Vitals I/O      Most Recent Min/Max in 24hrs   Temp 98.7 °F (37.1 °C) Temp  Min: 97.9 °F (36.6 °C)  Max: 99.1 °F (37.3 °C)   Pulse 80 Pulse  Min: 80  Max: 94   Resp 17 Resp  Min: 12  Max: 26   /77 BP  Min: 112/66  Max: 182/73   O2 Sat 98 % SpO2  Min: 96 %  Max: 100 %      Intake/Output Summary (Last 24 hours) at 1/6/2024 0557  Last data filed at 1/6/2024 0200  Gross per 24 hour   Intake 2720.77 ml   Output 2275 ml   Net 445.77 ml       Diet Renal; Renal [place]; No; No    Invasive Monitoring           Physical Exam   Physical Exam  Eyes:      Extraocular Movements: Extraocular movements intact.      Pupils: Pupils are equal, round, and reactive to light.   Skin:     General: Skin is warm and dry.      Capillary Refill: Capillary refill takes less than 2 seconds.   HENT:      Head: Normocephalic and atraumatic.      Mouth/Throat:      Mouth: Mucous membranes are moist.   Cardiovascular:      Rate and Rhythm: Normal rate and regular rhythm.      Pulses: Normal pulses.    Musculoskeletal:         General: Normal range of motion.   Abdominal:      Palpations: Abdomen is soft.      Tenderness: There is no abdominal tenderness.   Constitutional:       General: He is not in acute distress.     Appearance: He is well-developed.   Pulmonary:      Effort: Pulmonary effort is normal.      Breath sounds: Normal breath sounds.   Neurological:      General: No focal deficit present.      Mental Status: He is alert and oriented to person, place and time.      Motor: Strength full and intact in all extremities.            Diagnostic Studies      EKG: No new EKG   Imaging: CT angio  I have personally reviewed pertinent reports.   and I have personally reviewed pertinent films in PACS     Medications:  Scheduled PRN   acetaminophen, 975 mg, Q6H HALIE  atorvastatin, 40 mg, QPM  calcium acetate, 667 mg, TID With Meals  chlorhexidine, 15 mL, Q12H HALIE  cinacalcet, 60 mg, Daily  cloNIDine, 0.3 mg, Weekly  escitalopram, 20 mg, Daily  famotidine, 40 mg, Daily  gabapentin, 100 mg, HS  hydrALAZINE, 25 mg, TID  labetalol, 200 mg, TID  lisinopril, 40 mg, Daily  NIFEdipine, 90 mg, HS  niMODipine, 60 mg, Q4H HALIE  patient maintained insulin pump, 1 each, Q8H      bisacodyl, 10 mg, Daily PRN  hydrALAZINE, 10 mg, Q4H PRN  HYDROmorphone, 0.2 mg, Q4H PRN  insulin lispro, 300 Units, Daily PRN  metoclopramide, 10 mg, Q6H PRN  ondansetron, 4 mg, Q4H PRN  trimethobenzamide, 200 mg, Q6H PRN       Continuous    niCARdipine, 1-15 mg/hr, Last Rate: 7.5 mg/hr (01/06/24 0403)         Labs:    CBC    Recent Labs     01/04/24  2243   WBC 8.46   HGB 11.5*   HCT 34.3*        BMP    Recent Labs     01/04/24  2243 01/05/24  0548   SODIUM 135  --    K 5.3 4.7   CL 91*  --    CO2 27  --    AGAP 17  --    BUN 56*  --    CREATININE 13.49*  --    CALCIUM 9.0  --        Coags    Recent Labs     01/05/24  0351 01/05/24  0614   INR 1.04  --    PTT 30 29        Additional Electrolytes  No recent results       Blood Gas    No recent  results  No recent results LFTs  Recent Labs     01/04/24  2243   ALT 10   AST 15   ALKPHOS 79   ALB 4.1   TBILI 0.62       Infectious  No recent results  Glucose  Recent Labs     01/04/24  2243   GLUC 193*                   Juan Woodruff MD

## 2024-01-06 NOTE — QUICK NOTE
Per Rn Nacho pt not able to come to MRI this evening , not comfortable taking dexacom off, also pt is constantly vomitiing 1.5 842p td

## 2024-01-06 NOTE — PROGRESS NOTES
NEPHROLOGY PROGRESS NOTE   Mateus Mckeon 38 y.o. male MRN: [id number]  Unit/Bed#: ICU 03 Encounter: [id number]      ASSESSMENT & PLAN     38-year-old male with a history of end-stage kidney disease on hemodialysis presented with a headache and was found to have a subarachnoid hemorrhage     End-stage kidney disease on hemodialysis at [facility] Monday Wednesday Friday secondary to diabetic nephropathy  Started dialysis in 2018  Left upper extremity AV fistula  Was on PD in the past and was changed to hemodialysis in December 2020  137 sodium 35 bicarb 2 potassium 2.5 calcium 1 magnesium 100 dextrose with a 37 degree dialysate temperature a blood flow rate of 450 and a prescribed treatment time of 4 hours with an estimated dry weight of 93 kg  Tolerated dialysis without difficulty his next treatment will be on Monday.  He needs an MRI with gadolinium.  In the setting of a neurosurgical evaluation.  Will plan on hemodialysis for 3 consecutive days     Subarachnoid hemorrhage  Complaining of severe headaches and now in the ICU after CT scan showed a subarachnoid hemorrhage from the ER in [place]  Ongoing management per ICU team     Hypertension  History of hypertensive emergency and hypertensive urgency in the past  Outpatient records reviewed BP lowering medications include  Lisinopril 40 mg daily  Labetalol 200 mg 2 times a day-would increase this to 300 to 400 mg twice daily  Nifedipine 90 mg a day-can increase this to 60 mg twice daily  Clonidine 0.3 mg patch weekly  Hydralazine times a day  With current blood pressure is adequate  Admitted dry weight of 93 kg  Can de-escalate nicardipine drip as tolerated from a renal standpoint     Type 2 diabetes in the setting of end-stage kidney disease  Moderate glycemic control while on dialysis  Continue to monitor     Secondary hyperparathyroidism  On Cinacalcet 60 mg daily, will will placed on 120 mg here but given his nausea and vomiting with decrease the dose and  this is his outpatient dose as of mid December  If nausea persists can hold     Hyperphosphatemia  On Velphoro as an outpatient  On calcium acetate here  Can continue this for now     History of hyperkalemia  On Veltassa 5 mg on nondialysis days as an outpatient  His potassium is currently stable     Discussed with ICU team and we were in agreement with uptitrating oral blood pressure medication dialysis for 3 days post gadolinium      SUBJECTIVE:    Patient was seen today, waking up this morning, tolerated dialysis well yesterday with no acute complaints    12 point review of systems was otherwise negative besides what is mentioned above.    Medications:    Current Facility-Administered Medications:     acetaminophen (TYLENOL) tablet 975 mg, 975 mg, Oral, Q6H [facility], Radha Steen MD, 975 mg at 01/06/24 0523    atorvastatin (LIPITOR) tablet 40 mg, 40 mg, Oral, QPM, Juan Woodruff MD, 40 mg at 01/05/24 1745    bisacodyl (DULCOLAX) rectal suppository 10 mg, 10 mg, Rectal, Daily PRN, DEISI Tony    calcium acetate (PHOSLO) capsule 667 mg, 667 mg, Oral, TID With Meals, Juan Woodruff MD, 667 mg at 01/05/24 1635    chlorhexidine (PERIDEX) 0.12 % oral rinse 15 mL, 15 mL, Mouth/Throat, Q12H HALIE, DEISI Tony, 15 mL at 01/06/24 0807    cinacalcet (SENSIPAR) tablet 60 mg, 60 mg, Oral, Daily, Tyler Anand DO, 60 mg at 01/06/24 0808    cloNIDine (CATAPRES-TTS-3) 0.3 mg/24 hr TD weekly patch, 0.3 mg, Transdermal, Weekly, Andressa Marcos MD, 0.3 mg at 01/05/24 0608    escitalopram (LEXAPRO) tablet 20 mg, 20 mg, Oral, Daily, Juan Woodruff MD, 20 mg at 01/06/24 0808    famotidine (PEPCID) tablet 40 mg, 40 mg, Oral, Daily, Juan Woodruff MD, 40 mg at 01/06/24 0807    gabapentin (NEURONTIN) capsule 100 mg, 100 mg, Oral, HS, Juan Woodruff MD, 100 mg at 01/05/24 2137    hydrALAZINE (APRESOLINE) injection 10 mg, 10 mg, Intravenous, Q4H PRN, DEISI Egan    hydrALAZINE (APRESOLINE) tablet 25 mg, 25 mg, Oral,  TID, Juan Woodruff MD, 25 mg at 01/06/24 0807    HYDROmorphone HCl (DILAUDID) injection 0.2 mg, 0.2 mg, Intravenous, Q4H PRN, DEISI Egan, 0.2 mg at 01/06/24 0844    insulin lispro (HumaLOG) FOR PUMP REFILLS 300 Units, 300 Units, Subcutaneous Insulin Pump, Daily PRN, Rosemary Rocha DO    labetalol (NORMODYNE) tablet 400 mg, 400 mg, Oral, TID, Juan Woodruff MD    lisinopril (ZESTRIL) tablet 40 mg, 40 mg, Oral, Daily, Tyler Anand DO, 40 mg at 01/06/24 0807    metoclopramide (REGLAN) injection 10 mg, 10 mg, Intravenous, Q6H PRN, DEISI Egan    niCARdipine (CARDENE) 25 mg (STANDARD CONCENTRATION) in sodium chloride 0.9% 250 mL, 1-15 mg/hr, Intravenous, Titrated, Juan Woodruff MD, Last Rate: 50 mL/hr at 01/06/24 0855, 5 mg/hr at 01/06/24 0855    NIFEdipine (PROCARDIA XL) 24 hr tablet 90 mg, 90 mg, Oral, HS, Juan Woodruff MD, 90 mg at 01/05/24 2137    niMODipine (NIMOTOP) capsule 60 mg, 60 mg, Oral, Q4H HALIE, Andressa Marcos MD, 60 mg at 01/06/24 0807    ondansetron (ZOFRAN) injection 4 mg, 4 mg, Intravenous, Q4H PRN, DEISI Egan, 4 mg at 01/06/24 0844    PATIENT MAINTAINED INSULIN PUMP 1 each, 1 each, Subcutaneous, Q8H, Rosemary Rocha DO, 1 each at 01/06/24 0617    prochlorperazine (COMPAZINE) tablet 5 mg, 5 mg, Oral, Q6H PRN, Hansel Schultz DO    OBJECTIVE:    Vitals:    01/06/24 0600 01/06/24 0700 01/06/24 0800 01/06/24 0914   BP: 122/71 126/68 134/57 129/68   Pulse: 80 78 82 78   Resp: 21 20 21 15   Temp:       TempSrc:       SpO2: 98% 99% 98% 98%   Weight:       Height:            Temp:  [98.7 °F (37.1 °C)-99.4 °F (37.4 °C)] 99.4 °F (37.4 °C)  HR:  [78-94] 78  Resp:  [14-26] 15  BP: (119-182)/(54-86) 129/68  SpO2:  [96 %-100 %] 98 %     Body mass index is 34.95 kg/m².    Weight (last 2 days)       Date/Time Weight    01/05/24 0731 95.3 (210)    01/05/24 0615 95.3 (210.1)            I/O last 3 completed shifts:  In: 3020.8 [I.V.:3020.8]  Out: 0829  "[Other:2278]    I/O this shift:  In: 205.8 [I.V.:205.8]  Out: -       Physical exam:    General: no acute distress, cooperative  Eyes: conjunctivae pink, anicteric sclerae  ENT: lips and mucous membranes moist, no exudates, normal external ears  Neck: ROM intact, no JVD  Chest: No respiratory distress, no accessory muscle use  CVS: normal rate, non pericardial friction rub  Abdomen: soft, non-tender, non-distended, normoactive bowel sounds  Extremities: no edema of both legs  Skin: no rash  Neuro: awake, alert, oriented, grossly intact  Psych:  Pleasant affect    Invasive Devices:      Lab Results:   Results from last 7 days   Lab Units 01/06/24  0527 01/05/24  0548 01/04/24  2243 01/03/24  0934   WBC Thousand/uL 10.25*  --  8.46 7.16   HEMOGLOBIN g/dL 9.6*  --  11.5* 10.7*   HEMATOCRIT % 29.2*  --  34.3* 32.1*   PLATELETS Thousands/uL 208  --  210 215   POTASSIUM mmol/L 4.9 4.7 5.3 4.5   CHLORIDE mmol/L 95*  --  91* 93*   CO2 mmol/L 26  --  27 34*   BUN mg/dL 38*  --  56* 36*   CREATININE mg/dL 10.27*  --  13.49* 9.31*   CALCIUM mg/dL 9.1  --  9.0 8.4   MAGNESIUM mg/dL 2.2  --   --   --    PHOSPHORUS mg/dL 6.1*  --   --   --    ALK PHOS U/L  --   --  79 74   ALT U/L  --   --  10 10   AST U/L  --   --  15 15         Portions of the record may have been created with voice recognition software. Occasional wrong word or \"sound a like\" substitutions may have occurred due to the inherent limitations of voice recognition software. Read the chart carefully and recognize, using context, where substitutions have occurred.If you have any questions, please contact the dictating provider.    "

## 2024-01-07 ENCOUNTER — APPOINTMENT (INPATIENT)
Dept: NON INVASIVE DIAGNOSTICS | Facility: HOSPITAL | Age: 39
DRG: 064 | End: 2024-01-07
Payer: MEDICARE

## 2024-01-07 LAB
ANION GAP SERPL CALCULATED.3IONS-SCNC: 16 MMOL/L
BASOPHILS # BLD AUTO: 0.06 THOUSANDS/ÂΜL (ref 0–0.1)
BASOPHILS NFR BLD AUTO: 1 % (ref 0–1)
BUN SERPL-MCNC: 45 MG/DL (ref 5–25)
CALCIUM SERPL-MCNC: 7.8 MG/DL (ref 8.4–10.2)
CHLORIDE SERPL-SCNC: 97 MMOL/L (ref 96–108)
CO2 SERPL-SCNC: 25 MMOL/L (ref 21–32)
CREAT SERPL-MCNC: 12.62 MG/DL (ref 0.6–1.3)
EOSINOPHIL # BLD AUTO: 0.15 THOUSAND/ÂΜL (ref 0–0.61)
EOSINOPHIL NFR BLD AUTO: 2 % (ref 0–6)
ERYTHROCYTE [DISTWIDTH] IN BLOOD BY AUTOMATED COUNT: 14.1 % (ref 11.6–15.1)
EST. AVERAGE GLUCOSE BLD GHB EST-MCNC: 123 MG/DL
FOLATE SERPL-MCNC: 10.8 NG/ML
GFR SERPL CREATININE-BSD FRML MDRD: 4 ML/MIN/1.73SQ M
GLUCOSE SERPL-MCNC: 101 MG/DL (ref 65–140)
GLUCOSE SERPL-MCNC: 129 MG/DL (ref 65–140)
GLUCOSE SERPL-MCNC: 140 MG/DL (ref 65–140)
GLUCOSE SERPL-MCNC: 78 MG/DL (ref 65–140)
GLUCOSE SERPL-MCNC: 78 MG/DL (ref 65–140)
GLUCOSE SERPL-MCNC: 91 MG/DL (ref 65–140)
GLUCOSE SERPL-MCNC: 99 MG/DL (ref 65–140)
HBA1C MFR BLD: 5.9 %
HCT VFR BLD AUTO: 27.8 % (ref 36.5–49.3)
HGB BLD-MCNC: 9.1 G/DL (ref 12–17)
IMM GRANULOCYTES # BLD AUTO: 0.11 THOUSAND/UL (ref 0–0.2)
IMM GRANULOCYTES NFR BLD AUTO: 1 % (ref 0–2)
LYMPHOCYTES # BLD AUTO: 1.66 THOUSANDS/ÂΜL (ref 0.6–4.47)
LYMPHOCYTES NFR BLD AUTO: 17 % (ref 14–44)
MAGNESIUM SERPL-MCNC: 2.1 MG/DL (ref 1.9–2.7)
MCH RBC QN AUTO: 31.2 PG (ref 26.8–34.3)
MCHC RBC AUTO-ENTMCNC: 32.7 G/DL (ref 31.4–37.4)
MCV RBC AUTO: 95 FL (ref 82–98)
MONOCYTES # BLD AUTO: 0.74 THOUSAND/ÂΜL (ref 0.17–1.22)
MONOCYTES NFR BLD AUTO: 8 % (ref 4–12)
NEUTROPHILS # BLD AUTO: 7.17 THOUSANDS/ÂΜL (ref 1.85–7.62)
NEUTS SEG NFR BLD AUTO: 71 % (ref 43–75)
NRBC BLD AUTO-RTO: 0 /100 WBCS
PHOSPHATE SERPL-MCNC: 6 MG/DL (ref 2.7–4.5)
PLATELET # BLD AUTO: 195 THOUSANDS/UL (ref 149–390)
PMV BLD AUTO: 11 FL (ref 8.9–12.7)
POTASSIUM SERPL-SCNC: 4.2 MMOL/L (ref 3.5–5.3)
RBC # BLD AUTO: 2.92 MILLION/UL (ref 3.88–5.62)
SODIUM SERPL-SCNC: 138 MMOL/L (ref 135–147)
TSH SERPL DL<=0.05 MIU/L-ACNC: 3.87 UIU/ML (ref 0.45–4.5)
VIT B12 SERPL-MCNC: 1427 PG/ML (ref 180–914)
WBC # BLD AUTO: 9.89 THOUSAND/UL (ref 4.31–10.16)

## 2024-01-07 PROCEDURE — 82948 REAGENT STRIP/BLOOD GLUCOSE: CPT

## 2024-01-07 PROCEDURE — 85025 COMPLETE CBC W/AUTO DIFF WBC: CPT | Performed by: STUDENT IN AN ORGANIZED HEALTH CARE EDUCATION/TRAINING PROGRAM

## 2024-01-07 PROCEDURE — 99232 SBSQ HOSP IP/OBS MODERATE 35: CPT | Performed by: INTERNAL MEDICINE

## 2024-01-07 PROCEDURE — 99233 SBSQ HOSP IP/OBS HIGH 50: CPT | Performed by: SURGERY

## 2024-01-07 PROCEDURE — 83735 ASSAY OF MAGNESIUM: CPT | Performed by: STUDENT IN AN ORGANIZED HEALTH CARE EDUCATION/TRAINING PROGRAM

## 2024-01-07 PROCEDURE — 83036 HEMOGLOBIN GLYCOSYLATED A1C: CPT

## 2024-01-07 PROCEDURE — 93886 INTRACRANIAL COMPLETE STUDY: CPT

## 2024-01-07 PROCEDURE — 99232 SBSQ HOSP IP/OBS MODERATE 35: CPT | Performed by: STUDENT IN AN ORGANIZED HEALTH CARE EDUCATION/TRAINING PROGRAM

## 2024-01-07 PROCEDURE — 84443 ASSAY THYROID STIM HORMONE: CPT

## 2024-01-07 PROCEDURE — 92610 EVALUATE SWALLOWING FUNCTION: CPT

## 2024-01-07 PROCEDURE — 93886 INTRACRANIAL COMPLETE STUDY: CPT | Performed by: SURGERY

## 2024-01-07 PROCEDURE — 80048 BASIC METABOLIC PNL TOTAL CA: CPT | Performed by: STUDENT IN AN ORGANIZED HEALTH CARE EDUCATION/TRAINING PROGRAM

## 2024-01-07 PROCEDURE — 84100 ASSAY OF PHOSPHORUS: CPT | Performed by: STUDENT IN AN ORGANIZED HEALTH CARE EDUCATION/TRAINING PROGRAM

## 2024-01-07 PROCEDURE — 82746 ASSAY OF FOLIC ACID SERUM: CPT

## 2024-01-07 PROCEDURE — 97163 PT EVAL HIGH COMPLEX 45 MIN: CPT

## 2024-01-07 PROCEDURE — 82607 VITAMIN B-12: CPT

## 2024-01-07 RX ORDER — AMOXICILLIN 250 MG
1 CAPSULE ORAL
Status: DISCONTINUED | OUTPATIENT
Start: 2024-01-07 | End: 2024-01-08

## 2024-01-07 RX ORDER — GABAPENTIN 100 MG/1
100 CAPSULE ORAL 3 TIMES DAILY
Status: DISCONTINUED | OUTPATIENT
Start: 2024-01-07 | End: 2024-01-09

## 2024-01-07 RX ORDER — POLYETHYLENE GLYCOL 3350 17 G/17G
17 POWDER, FOR SOLUTION ORAL DAILY
Status: DISCONTINUED | OUTPATIENT
Start: 2024-01-07 | End: 2024-01-23 | Stop reason: HOSPADM

## 2024-01-07 RX ORDER — OXYCODONE HYDROCHLORIDE 5 MG/1
5 TABLET ORAL EVERY 4 HOURS PRN
Status: DISCONTINUED | OUTPATIENT
Start: 2024-01-07 | End: 2024-01-23 | Stop reason: HOSPADM

## 2024-01-07 RX ORDER — HYDROMORPHONE HCL IN WATER/PF 6 MG/30 ML
0.2 PATIENT CONTROLLED ANALGESIA SYRINGE INTRAVENOUS EVERY 4 HOURS PRN
Status: DISCONTINUED | OUTPATIENT
Start: 2024-01-07 | End: 2024-01-09

## 2024-01-07 RX ADMIN — ATORVASTATIN CALCIUM 40 MG: 40 TABLET, FILM COATED ORAL at 18:02

## 2024-01-07 RX ADMIN — LABETALOL HYDROCHLORIDE 400 MG: 200 TABLET, FILM COATED ORAL at 21:57

## 2024-01-07 RX ADMIN — CALCIUM ACETATE 667 MG: 667 CAPSULE ORAL at 08:50

## 2024-01-07 RX ADMIN — HYDRALAZINE HYDROCHLORIDE 25 MG: 25 TABLET, FILM COATED ORAL at 21:57

## 2024-01-07 RX ADMIN — POLYETHYLENE GLYCOL 3350 17 G: 17 POWDER, FOR SOLUTION ORAL at 12:30

## 2024-01-07 RX ADMIN — CALCIUM ACETATE 667 MG: 667 CAPSULE ORAL at 18:04

## 2024-01-07 RX ADMIN — CINACALCET 60 MG: 30 TABLET ORAL at 08:50

## 2024-01-07 RX ADMIN — NIMODIPINE 60 MG: 30 CAPSULE, LIQUID FILLED ORAL at 23:49

## 2024-01-07 RX ADMIN — NIMODIPINE 60 MG: 30 CAPSULE, LIQUID FILLED ORAL at 08:50

## 2024-01-07 RX ADMIN — OXYCODONE HYDROCHLORIDE 5 MG: 5 TABLET ORAL at 10:31

## 2024-01-07 RX ADMIN — NIFEDIPINE 90 MG: 30 TABLET, FILM COATED, EXTENDED RELEASE ORAL at 21:57

## 2024-01-07 RX ADMIN — NIMODIPINE 60 MG: 30 CAPSULE, LIQUID FILLED ORAL at 12:30

## 2024-01-07 RX ADMIN — CALCIUM ACETATE 667 MG: 667 CAPSULE ORAL at 12:34

## 2024-01-07 RX ADMIN — GABAPENTIN 100 MG: 100 CAPSULE ORAL at 15:59

## 2024-01-07 RX ADMIN — NIMODIPINE 60 MG: 30 CAPSULE, LIQUID FILLED ORAL at 20:30

## 2024-01-07 RX ADMIN — FAMOTIDINE 40 MG: 20 TABLET, FILM COATED ORAL at 08:50

## 2024-01-07 RX ADMIN — Medication 2.5 MG: at 13:34

## 2024-01-07 RX ADMIN — CHLORHEXIDINE GLUCONATE 15 ML: 1.2 SOLUTION ORAL at 08:50

## 2024-01-07 RX ADMIN — ACETAMINOPHEN 975 MG: 325 TABLET, FILM COATED ORAL at 12:30

## 2024-01-07 RX ADMIN — HYDRALAZINE HYDROCHLORIDE 25 MG: 25 TABLET, FILM COATED ORAL at 15:59

## 2024-01-07 RX ADMIN — ESCITALOPRAM OXALATE 20 MG: 20 TABLET ORAL at 08:50

## 2024-01-07 RX ADMIN — LABETALOL HYDROCHLORIDE 400 MG: 200 TABLET, FILM COATED ORAL at 08:50

## 2024-01-07 RX ADMIN — LISINOPRIL 40 MG: 20 TABLET ORAL at 08:50

## 2024-01-07 RX ADMIN — ACETAMINOPHEN 975 MG: 325 TABLET, FILM COATED ORAL at 05:31

## 2024-01-07 RX ADMIN — NIMODIPINE 60 MG: 30 CAPSULE, LIQUID FILLED ORAL at 03:54

## 2024-01-07 RX ADMIN — NIMODIPINE 60 MG: 30 CAPSULE, LIQUID FILLED ORAL at 15:59

## 2024-01-07 RX ADMIN — ONDANSETRON 4 MG: 2 INJECTION INTRAMUSCULAR; INTRAVENOUS at 09:15

## 2024-01-07 RX ADMIN — SENNOSIDES AND DOCUSATE SODIUM 1 TABLET: 50; 8.6 TABLET ORAL at 21:57

## 2024-01-07 RX ADMIN — LABETALOL HYDROCHLORIDE 400 MG: 200 TABLET, FILM COATED ORAL at 15:59

## 2024-01-07 RX ADMIN — HYDRALAZINE HYDROCHLORIDE 25 MG: 25 TABLET, FILM COATED ORAL at 08:50

## 2024-01-07 RX ADMIN — GABAPENTIN 100 MG: 100 CAPSULE ORAL at 21:57

## 2024-01-07 RX ADMIN — ACETAMINOPHEN 975 MG: 325 TABLET, FILM COATED ORAL at 18:02

## 2024-01-07 RX ADMIN — CHLORHEXIDINE GLUCONATE 15 ML: 1.2 SOLUTION ORAL at 21:57

## 2024-01-07 NOTE — PLAN OF CARE
Problem: PHYSICAL THERAPY ADULT  Goal: Performs mobility at highest level of function for planned discharge setting.  See evaluation for individualized goals.  Description: Treatment/Interventions: Functional transfer training, LE strengthening/ROM, Therapeutic exercise, Endurance training, Elevations, Cognitive reorientation, Patient/family training, Equipment eval/education, Bed mobility, Gait training, Spoke to nursing, Spoke to case management, OT, Family          See flowsheet documentation for full assessment, interventions and recommendations.  Note: Prognosis: Good  Problem List: Decreased endurance, Impaired balance, Decreased mobility, Pain, Impaired vision, Decreased cognition, Impaired judgement, Decreased safety awareness  Assessment: Pt is a 38 y.o. male admitted to [facility] on 1/5/24 with headache. Pt received a primary medical dx of SAH. Pt has the following comorbidities which affect their treatment: active problems listed above, LLUVIA, anxiety, CKD, depression, diabetes mellitus, HLD, hypoglycemia, muscle weakness, obesity, orthostatic hypotension, peripheral polyneuropathy, seizure, retinopathy, skin cancer, stroke, as well as personal factors including stairs to access home. Pt has a high complexity clinical presentation due to Ongoing medical management for primary dx, Increased reliance on more restrictive AD compared to baseline, Decreased activity tolerance compared to baseline, Fall risk, Increased assistance needed from caregiver at current time, Ongoing telemetry monitoring, Cog status, Trending lab values, Diagnostic imaging pending, Continuous pulse oximetry monitoring  , and PMH. PT was consulted to evaluate pt's functional mobility and discharge needs. Upon evaluation, patient required S for bed mobility, minAx2 for STS transfers, minAx2 for ambulation. Body system impairments include impaired functional strength, balance, endurance, cognition, +pain. Pt's functional activity impairments  include: impaired mobility and activity tolerance. Participation restrictions include inability to safely access home/community. At conclusion of eval, pt remained seated in chair with chair alarm, phone, call bell, and all other personal needs within reach. Pt would benefit from skilled PT to address their functional mobility limitations. The patient's AM-PAC Basic Mobility Inpatient Short Form Raw Score is 15. A Raw score of greater than 16 suggests the patient may benefit from discharge to home. Please also refer to the recommendation of the Physical Therapist for safe discharge planning. Anticipate pt will progress during hospital stay;pending progress prior to dc home.  Barriers to Discharge: Inaccessible home environment     Rehab Resource Intensity Level, PT: III (Minimum Resource Intensity) (OPPT pending progress)    See flowsheet documentation for full assessment.

## 2024-01-07 NOTE — PROGRESS NOTES
"Progress Note - Neurosurgery   Mateus Mckeon 38 y.o. male MRN: [id number]  Unit/Bed#: ICU 01 Encounter: [id number]    Assessment/Plan:    Mateus Mckeon is a 38 year old male presenting with headache and basilar cistern SAH consistent with perimesencephalic type bleed.    - Right basilar cistern SAH - BD 8, HH 2, MF 3.  Continue to monitor neuro exam closely.  MRI brain and C spine pending, planning for tomorrow to coordinate around dialysis  STAT CTA with decline in GCS > 2 pts in 1 hour.  Angiogram 1/5/24 complete without vascular abnormality  Maintain SBP < 160 mmHg.  Will maintain on SAH pathway until proven otherwise:  Spasm watch  Daily TCDs: Pending  Continue Nimodipine 60mg Q4H  Normonatremia (today 138), euvolemia, Hgb >8  SBP<160 mmHg  DVT PPX: SCDs.  Mobilize as tolerated.  DVT ppx: SCD's, hold pharmacologic DVT ppx.    Subjective/Objective   Chief Complaint: Continues to report nausea, otherwise non focal exam    Vitals: Blood pressure 159/80, pulse 76, temperature 99 °F (37.2 °C), temperature source Oral, resp. rate (!) 25, height 5' 5\" (1.651 m), weight 95.3 kg (210 lb), SpO2 98%.,Body mass index is 34.95 kg/m².      Physical Exam:   Physical Exam  Neurologic Exam  Awakens to voice  Oriented, appropriate  PERRL  EOMI  5/5 throughout    Invasive Devices       Peripheral Intravenous Line  Duration             Peripheral IV 01/04/24 Right Antecubital 2 days    Peripheral IV 01/05/24 Dorsal (posterior);Right Forearm 2 days    Peripheral IV 01/05/24 Right;Ventral (anterior) Forearm 2 days              Line  Duration             Peritoneal Dialysis Catheter [place]-neck/flanged collar Left lower abdomen 1979 days    Hemodialysis Access 11/02/20 Left Forearm 1161 days    Hemodialysis AV Fistula 11/09/20 Left Other (Comment) 1153 days                          Lab Results: I have personally reviewed pertinent results.      Imaging Studies: I have personally reviewed pertinent reports.      "

## 2024-01-07 NOTE — PHYSICAL THERAPY NOTE
Physical Therapy Evaluation    Patient Name: Mateus Mckeon    Today's Date: 1/7/2024     Problem List  Principal Problem:    Subarachnoid hemorrhage (HCC)  Active Problems:    End stage kidney disease (HCC)    Anemia due to chronic kidney disease, on chronic dialysis (HCC)       Past Medical History  Past Medical History:   Diagnosis Date    Acute kidney injury (HCC)     Ambulates with cane     Anuria     Anxiety     Cellulitis of right elbow 03/31/2021    Chronic kidney disease     Depression     Diabetes mellitus (HCC)     Diarrhea     Emesis 10/24/2020    End stage renal disease (HCC) 02/11/2018    Formatting of this note might be different from the original. Last Assessment & Plan:  Secondary to DM.  On nightly PD.  Followed by Ne[facility].  Patient considering transplant for kidney and pancreas through [facility] Formatting of this note might be different from the original. Last Assessment & Plan:  Formatting of this note might be different from the original. 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    Esophagitis 07/21/2015    Falls     Gastroparesis     GERD (gastroesophageal reflux disease)     History of shingles 2010    History of transfusion 02/2018    no adverse reaction    Hyperlipidemia     Hyperphosphatemia     Hypertension     Hypoglycemia 07/15/2022    Itching     Mastoiditis of right side 07/15/2022    Muscle weakness     general unsteadiness    Obesity (BMI 30.0-34.9) 09/09/2019    Orthostatic hypotension 10/25/2020    Peripheral polyneuropathy 11/20/2019    PONV (postoperative nausea and vomiting) 01/26/2018    Protein-calorie malnutrition (HCC) 11/23/2020    Recurrent peritonitis (HCC) due to peritoneal dialysis catheter 07/31/2020    Retinopathy     Seizures (HCC)     early 2020 - one time    Skin abnormality     some dime size areas where skin was scratched from itching    Spontaneous bacterial  peritonitis (HCC) 10/19/2020    Squamous cell skin cancer     left temple    Stroke (HCC)     x2 - off balance/no driving/fatigue    Swelling of both lower extremities     Traumatic onycholysis 07/21/2022    Vomiting     Wears glasses         Past Surgical History  Past Surgical History:   Procedure Laterality Date    CARDIAC ELECTROPHYSIOLOGY PROCEDURE N/A 9/21/2023    Procedure: Cardiac loop recorder explant;  Surgeon: Parish Morgan MD;  [facility]: [facility];  Service: Cardiology    CARDIAC LOOP RECORDER  05/2018    COLONOSCOPY      EGD      EYE SURGERY Right     IR AV FISTULAGRAM/GRAFTOGRAM  02/23/2021    IR TUNNELED CENTRAL LINE PLACEMENT  02/16/2021    IR TUNNELED DIALYSIS CATHETER PLACEMENT  11/18/2020    IR TUNNELED DIALYSIS CATHETER REMOVAL  02/12/2021    IR TUNNELED DIALYSIS CATHETER REMOVAL  03/11/2021    MOHS SURGERY Left 12/14/2022    Left temple with Dr. Hassan    PERITONEAL CATHETER INSERTION N/A 08/27/2018    Procedure: UNROOF PD CATHETER;  Surgeon: Felipe Lindo DO;  Location: [facility];  Service: General    ME ARTERIOVENOUS ANASTOMOSIS OPEN DIRECT Left 11/09/2020    Procedure: CREATION FISTULA  ARTERIOVENOUS (AV) - LEFT WRIST;  Surgeon: Placido Altamirano MD;  Location: [facility];  Service: Vascular    ME ESOPHAGOGASTRODUODENOSCOPY TRANSORAL DIAGNOSTIC N/A 04/18/2019    Procedure: ESOPHAGOGASTRODUODENOSCOPY (EGD);  Surgeon: Ale Figueroa MD;  Location: [facility];  Service: Gastroenterology    ME LAPS INSERTION TUNNELED INTRAPERITONEAL CATHETER N/A 08/06/2018    Procedure: LAPAROSCOPIC PD CATHETER PLACEMENT;  Surgeon: Felipe Lindo DO;  [facility];  Service: General    ME REMOVAL TUNNELED INTRAPERITONEAL CATHETER N/A 11/18/2020    Procedure: REMOVAL CATHETER PERITONEAL DIALYSIS;  Surgeon: Abdifatah Ty MD;  Location: [facility];  Service: General    TONSILLECTOMY      UPPER GASTROINTESTINAL ENDOSCOPY           01/07/24 1336   PT Last Visit   PT Visit Date 01/07/24   Note Type    Note type Evaluation   Pain Assessment   Pain Assessment Tool 0-10   Pain Score 6  (pain 6/10 after being seated in bedside chair, upon sitting EOB pt states occipital pain increased to 10/10)   Pain Location/Orientation Location: Head  (t/o occipital region)   Pain Radiating Towards wrapping around back of head   Pain Onset/Description Descriptor: Throbbing;Descriptor: Sharp;Onset: Sudden;Frequency: Intermittent  (sxs onset going from supine to sitting EOB)   Hospital Pain Intervention(s) Medication (See MAR)   Restrictions/Precautions   Weight Bearing Precautions Per Order No   Other Precautions Telemetry;Bed Alarm;Multiple lines;Chair Alarm;Cognitive;Visual impairment;Pain;Fall Risk  (SBP < 160)   Home Living   Type of Home House   Home Layout Two level;Stairs to enter with rails;Bed/bath upstairs  (2 NIRU)   Bathroom Shower/Tub Tub/shower unit   Bathroom Toilet Standard   Bathroom Equipment Shower chair   Home Equipment Cane  (SPC for community ambulation)   Prior Function   Level of [place] Independent with ADLs;Independent with functional mobility;Needs assistance with IADLS   Lives With Family  (Both parents are retired and frequently home)   Receives Help From Family   IADLs Independent with meal prep;Independent with medication management;Family/Friend/Other provides transportation   Falls in the last 6 months 1 to 4   Vocational Unemployed   Comments mother reports pt had difficulty with vision after CVA-unable to test vision at time of eval due to compliance   General   Family/Caregiver Present Yes  (Mother)   Cognition   Overall Cognitive Status WFL   Arousal/Participation Cooperative   Orientation Level Oriented X4   Following Commands Follows one step commands without difficulty   Subjective   Subjective reporting headache with upright mobiltiy-RN aware   RLE Assessment   RLE Assessment WFL   LLE Assessment   LLE Assessment WFL   Light Touch   RLE Light Touch Grossly intact   LLE Light Touch  Grossly intact   Bed Mobility   Rolling L 5  Supervision   Supine to Sit 5  Supervision   Sit to Supine 5  Supervision   Transfers   Sit to Stand 4  Minimal assistance   Additional items Assist x 2   Stand to Sit 4  Minimal assistance   Additional items Assist x 1;Increased time required;Verbal cues   Additional Comments b/l HHA   Ambulation/Elevation   Gait pattern Excessively slow;Short stride;Shuffling   Gait Assistance 4  Minimal assist   Additional items Assist x 2   Assistive Device   (b/l HHA)   Distance 2 ft  (limited by headache)   Balance   Static Sitting Fair +   Dynamic Sitting Fair +   Static Standing Fair +   Dynamic Standing Fair -   Ambulatory Poor +   Endurance Deficit   Endurance Deficit Yes   Endurance Deficit Description pain   Activity Tolerance   Activity Tolerance Patient limited by pain   Medical Staff Made Aware SPT Willis   Nurse Made Aware yes-cleared   Assessment   Prognosis Good   Problem List Decreased endurance;Impaired balance;Decreased mobility;Pain;Impaired vision;Decreased cognition;Impaired judgement;Decreased safety awareness   Assessment Pt is a 38 y.o. male admitted to [facility] on 1/5/24 with headache. Pt received a primary medical dx of SAH. Pt has the following comorbidities which affect their treatment: active problems listed above, LLUVIA, anxiety, CKD, depression, diabetes mellitus, HLD, hypoglycemia, muscle weakness, obesity, orthostatic hypotension, peripheral polyneuropathy, seizure, retinopathy, skin cancer, stroke, as well as personal factors including stairs to access home. Pt has a high complexity clinical presentation due to Ongoing medical management for primary dx, Increased reliance on more restrictive AD compared to baseline, Decreased activity tolerance compared to baseline, Fall risk, Increased assistance needed from caregiver at current time, Ongoing telemetry monitoring, Cog status, Trending lab values, Diagnostic imaging pending, Continuous pulse oximetry  monitoring  , and PMH. PT was consulted to evaluate pt's functional mobility and discharge needs. Upon evaluation, patient required S for bed mobility, minAx2 for STS transfers, minAx2 for ambulation. Body system impairments include impaired functional strength, balance, endurance, cognition, +pain. Pt's functional activity impairments include: impaired mobility and activity tolerance. Participation restrictions include inability to safely access home/community. At conclusion of eval, pt remained seated in chair with chair alarm, phone, call bell, and all other personal needs within reach. Pt would benefit from skilled PT to address their functional mobility limitations. The patient's AM-PAC Basic Mobility Inpatient Short Form Raw Score is 15. A Raw score of greater than 16 suggests the patient may benefit from discharge to home. Please also refer to the recommendation of the Physical Therapist for safe discharge planning. Anticipate pt will progress during hospital stay;pending progress prior to dc home.   Barriers to Discharge Inaccessible home environment   Goals   Patient Goals to go home   STG Expiration Date 01/21/24   Short Term Goal #1 In 14 days, pt will: 1) perform bed mobility with mod I to promote functional independence and decrease caregiver burden. 2) perform transfers to<>from all surfaces with mod I to promote functional independence and decrease caregiver burden. 3) ambulate 150' with mod I and least restrictive device to promote safe return to home. 4) negotiate 13 stairs with S to promote safe return to home. 5) improve balance grades by 1/2 to promote safety with functional mobility. 6) improve strength grades by 1/2 to promote safety with functional mobility.   PT Treatment Day 0   Plan   Treatment/Interventions Functional transfer training;LE strengthening/ROM;Therapeutic exercise;Endurance training;Elevations;Cognitive reorientation;Patient/family training;Equipment eval/education;Bed  mobility;Gait training;Spoke to nursing;Spoke to case management;OT;Family   PT Frequency 3-5x/wk   Discharge Recommendation   Rehab Resource Intensity Level, PT III (Minimum Resource Intensity)  (OPPT pending progress)   AM-PAC Basic Mobility Inpatient   Turning in Flat Bed Without Bedrails 3   Lying on Back to Sitting on Edge of Flat Bed Without Bedrails 3   Moving Bed to Chair 2   Standing Up From Chair Using Arms 3   Walk in Room 2   Climb 3-5 Stairs With Railing 2   Basic Mobility Inpatient Raw Score 15   Basic Mobility Standardized Score 36.97   Highest Level Of Mobility   -[facility] Goal 4: Move to chair/commode   -HL Achieved 5: Stand (1 or more minutes)   Modified Radha Scale   Modified [place] Scale 4   Barthel Index   Feeding 5   Bathing 5   Grooming Score 5   Dressing Score 5   Bladder Score 10   Bowels Score 10   Toilet Use Score 10   Transfers (Bed/Chair) Score 5   Mobility (Level Surface) Score 0   Stairs Score 0   Barthel Index Score 55   End of Consult   Patient Position at End of Consult Bedside chair;Bed/Chair alarm activated;All needs within reach   Elliot Mclaughlin, PT, DPT

## 2024-01-07 NOTE — PROGRESS NOTES
[facility]  Progress Note: Critical Care  Name: Mateus Mckeon 38 y.o. male I MRN: [id number]  Unit/Bed#: ICU 01 I Date of Admission: 1/5/2024   Date of Service: 1/7/2024 I Hospital Day: 2    Assessment/Plan   ASSESSMENT/PLAN:  Neuro/Psych/ENT: SAH / Headache due to SAH  Plan: Frequent neuro checks. CAM-ICU. Delirium precautions. Frequent re-orientation. Regulate sleep/wake cycle. Seizure precautions.   Analgesia: Multimodal. Tylenol scheduled. Dilaudid prn.   Lexapro   Gabapentin  MRI brain with contrast Monday     CV: HTN / HLD  Plan: Continuous cardiopulmonary monitoring. Maintain MAP >65. Monitor resuscitative endpoints.   Labetalol  Hydralazine  Lisinopril  Nifedipine  Nimodipine  Atorvastatin    Pulm: No acute issues  Plan: Continuous pulse oximetry. Incentive spirometry when appropriate. Pulmonary toilet. Elevate HOB. Maintain O2 sat >90%. Aspiration precautions.     GI: GERD / nausea  Plan: Bowel regimen: Dulcolax prn. Monitor stool output and quality.  GI prophylaxis: Famotidine   Zofran prn  Reglan prn  Compazine prn    /Renal: ESRD   Plan: Strict I/O. Monitor urine output and renal indices. Avoid nephrotoxins.   PhosLo  Cinacalcet  HD schedule per nephrology - will need three days in a row following contrast MRI    F/E/N: No acute issues  Plan:   F: none continuous. Fluid resuscitation prn.  E: Monitor and replete electrolytes for ionized calcium >1.12, Mg >2, Phos >3, K >4.  N: Renal diet    Endo: DM1 / secondary hyperparthyroidism  Insulin: home insulin pump  Steroids: not indicated  Plan: Monitor blood glucose for goal 140-180.      Heme: No acute issues  Plan: Monitor Hgb and transfuse for Hgb <7 or based on hemodynamics if actively bleeding. Monitor platelets.  VTE prophylaxis: hold for now. Sequential compression devices and/or foot pumps.    ID: No acute issues  Plan: Monitor fever curve and WBC. Maintain normothermia. Daily CHG bath, Peridex oral  rinse, and nasal antiseptic while in ICU.    MSK/Skin: No acute issues  Plan: Frequent turning and repositioning. Pressure injury prevention. Monitor for skin breakdown. PT/OT when appropriate. Encourage OOBTC and ambulation when appropriate. Local wound care prn.    Invasive medical instruments:  Invasive Devices       Peripheral Intravenous Line  Duration             Peripheral IV 01/04/24 Right Antecubital 2 days    Peripheral IV 01/05/24 Dorsal (posterior);Right Forearm 2 days    Peripheral IV 01/05/24 Right;Ventral (anterior) Forearm 2 days              Line  Duration             Peritoneal Dialysis Catheter [place]-neck/flanged collar Left lower abdomen 1979 days    Hemodialysis Access 11/02/20 Left Forearm 1161 days    Hemodialysis AV Fistula 11/09/20 Left Other (Comment) 1153 days                     ICU LOS: 1d 23h    CODE STATUS: Level 1    FAMILY UPDATE: I have updated and/or plan to update family today by telephone or in person if applicable.    HOME MEDICATIONS: I have reviewed home medications and reordered as clinically appropriate.  Medications Prior to Admission   Medication    aspirin (ECOTRIN LOW STRENGTH) 81 mg EC tablet    atorvastatin (LIPITOR) 40 mg tablet    b complex vitamins capsule    B-D ULTRAFINE III SHORT PEN 31G X 8 MM MISC    calcium acetate (PHOSLO) capsule    cinacalcet (SENSIPAR) 60 MG tablet    cloNIDine (CATAPRES-TTS-3) 0.3 mg/24 hr    escitalopram (LEXAPRO) 20 mg tablet    famotidine (PEPCID) 40 MG tablet    gabapentin (NEURONTIN) 100 mg capsule    GLUCAGON EMERGENCY 1 MG injection    Gvoke HypoPen 1-Pack 1 MG/0.2ML SOAJ    hydrALAZINE (APRESOLINE) 25 mg tablet    hydrOXYzine HCL (ATARAX) 10 mg tablet    Insulin Disposable Pump (Omnipod 5 G6 Intro, Gen 5,) KIT    Insulin Disposable Pump (Omnipod 5 G6 Pod, Gen 5,) MISC    labetalol (NORMODYNE) 200 mg tablet    lisinopril (ZESTRIL) 40 mg tablet    mupirocin (BACTROBAN) 2 % ointment    NIFEdipine (PROCARDIA XL) 90 mg 24 hr tablet     NovoLOG 100 UNIT/ML injection    ondansetron (ZOFRAN) 4 mg tablet    Patiromer Sorbitex Calcium (VELTASSA PO)    saccharomyces boulardii (FLORASTOR) 250 mg capsule    SPS 15 GM/60ML suspension    traZODone (DESYREL) 50 mg tablet    triamcinolone (KENALOG) 0.1 % ointment    Velphoro 500 MG CHEW       IMAGING: I have reviewed pertinent films and reports.     LABS: I have reviewed pertinent lab results.    MICRO: I have reviewed microbiology data and adjusted antibiotic regimen as clinically appropriate.    MULTIDISCIPLINARY CARE: I have performed bedside rounds with nursing colleagues and will discuss the plan above with attending and full CC team.    Bryan Concepcion PA-C  01/07/24    Disposition: Critical care    ICU Core Measures     A: Assess, Prevent, and Manage Pain Has pain been assessed? Yes  Need for changes to pain regimen? No   B: Both SAT/SAT  N/A   C: Choice of Sedation RASS Goal: N/A patient not on sedation  Need for changes to sedation or analgesia regimen? No   D: Delirium CAM-ICU: Negative   E: Early Mobility  Plan for early mobility? Yes   F: Family Engagement Plan for family engagement today? Yes         Prophylaxis:  VTE Contraindicated secondary to: brain bleed   Stress Ulcer  covered byfamotidine (PEPCID) 40 MG tablet [[id number]] (Long-Term Med), famotidine (PEPCID) tablet 40 mg [[id number]]        Significant 24hr Events     24hr events: NAEO     Subjective     Review of Systems   All other systems reviewed and are negative.       Objective                            Vitals I/O      Most Recent Min/Max in 24hrs   Temp 99 °F (37.2 °C) Temp  Min: 99 °F (37.2 °C)  Max: 99.3 °F (37.4 °C)   Pulse 72 Pulse  Min: 70  Max: 82   Resp 17 Resp  Min: 13  Max: 22   /77 BP  Min: 114/61  Max: 155/83   O2 Sat 98 % SpO2  Min: 97 %  Max: 100 %      Intake/Output Summary (Last 24 hours) at 1/7/2024 0504  Last data filed at 1/7/2024 0401  Gross per 24 hour   Intake 874.58 ml   Output --   Net 874.58 ml        Diet Renal; Renal [place]; No; No    Invasive Monitoring           Physical Exam   Physical Exam  Vitals reviewed.   Eyes:      Pupils: Pupils are equal, round, and reactive to light.   Skin:     General: Skin is warm and dry.      Capillary Refill: Capillary refill takes less than 2 seconds.   HENT:      Head: Normocephalic.   Neck:      Vascular: No JVD.   Cardiovascular:      Rate and Rhythm: Normal rate and regular rhythm.      Pulses: Normal pulses.      Heart sounds: Normal heart sounds.   Musculoskeletal:      Right lower leg: No edema.      Left lower leg: No edema.   Abdominal: General: There is no distension.      Palpations: Abdomen is soft.      Tenderness: There is no abdominal tenderness.   Constitutional:       General: He is not in acute distress.     Appearance: He is not ill-appearing or toxic-appearing.   Pulmonary:      Effort: Pulmonary effort is normal. No tachypnea, accessory muscle usage, respiratory distress or accessory muscle usage.      Breath sounds: Normal breath sounds and air entry.   Neurological:      General: No focal deficit present.      Mental Status: He is alert and oriented to person, place, and time.            Diagnostic Studies      EKG: reviewed  Imaging:  I have personally reviewed pertinent reports.   and I have personally reviewed pertinent films in PACS     Medications:  Scheduled PRN   acetaminophen, 975 mg, Q6H HALIE  atorvastatin, 40 mg, QPM  calcium acetate, 667 mg, TID With Meals  chlorhexidine, 15 mL, Q12H HALIE  cinacalcet, 60 mg, Daily  cloNIDine, 0.3 mg, Weekly  escitalopram, 20 mg, Daily  famotidine, 40 mg, Daily  gabapentin, 100 mg, HS  hydrALAZINE, 25 mg, TID  labetalol, 400 mg, TID  lisinopril, 40 mg, Daily  NIFEdipine, 90 mg, HS  niMODipine, 60 mg, Q4H HALIE  patient maintained insulin pump, 1 each, Q8H      bisacodyl, 10 mg, Daily PRN  hydrALAZINE, 10 mg, Q4H PRN  HYDROmorphone, 0.2 mg, Q4H PRN  insulin lispro, 300 Units, Daily PRN  metoclopramide, 10  mg, Q6H PRN  ondansetron, 4 mg, Q4H PRN  prochlorperazine, 5 mg, Q6H PRN       Continuous          Labs:    CBC    Recent Labs     01/06/24  0527   WBC 10.25*   HGB 9.6*   HCT 29.2*        BMP    Recent Labs     01/05/24  0548 01/06/24  0527   SODIUM  --  136   K 4.7 4.9   CL  --  95*   CO2  --  26   AGAP  --  15   BUN  --  38*   CREATININE  --  10.27*   CALCIUM  --  9.1       Coags    Recent Labs     01/05/24  0614   PTT 29        Additional Electrolytes  Recent Labs     01/06/24  0527   MG 2.2   PHOS 6.1*          Blood Gas    No recent results  No recent results LFTs  No recent results    Infectious  No recent results  Glucose  Recent Labs     01/06/24  0527   GLUC 225*               Non-Critical Care Time Statement: I have spent a total time of 20 minutes in caring for this patient including Counseling / Coordination of care, Documenting in the medical record, Reviewing / ordering tests, medicine, procedures  , Obtaining or reviewing history  , and Communicating with other healthcare professionals .     Bryan Concepcion PA-C

## 2024-01-07 NOTE — PLAN OF CARE
Problem: PAIN - ADULT  Goal: Verbalizes/displays adequate comfort level or baseline comfort level  Description: Interventions:  - Encourage patient to monitor pain and request assistance  - Assess pain using appropriate pain scale  - Administer analgesics based on type and severity of pain and evaluate response  - Implement non-pharmacological measures as appropriate and evaluate response  - Consider cultural and social influences on pain and pain management  - Notify physician/advanced practitioner if interventions unsuccessful or patient reports new pain  Outcome: Progressing     Problem: INFECTION - ADULT  Goal: Absence or prevention of progression during hospitalization  Description: INTERVENTIONS:  - Assess and monitor for signs and symptoms of infection  - Monitor lab/diagnostic results  - Monitor all insertion sites, i.e. indwelling lines, tubes, and drains  - Monitor endotracheal if appropriate and nasal secretions for changes in amount and color  - [place] appropriate cooling/warming therapies per order  - Administer medications as ordered  - Instruct and encourage patient and family to use good hand hygiene technique  - Identify and instruct in appropriate isolation precautions for identified infection/condition  Outcome: Progressing  Goal: Absence of fever/infection during neutropenic period  Description: INTERVENTIONS:  - Monitor WBC    Outcome: Progressing     Problem: SAFETY ADULT  Goal: Patient will remain free of falls  Description: INTERVENTIONS:  - Educate patient/family on patient safety including physical limitations  - Instruct patient to call for assistance with activity   - Consult OT/PT to assist with strengthening/mobility   - Keep Call bell within reach  - Keep bed low and locked with side rails adjusted as appropriate  - Keep care items and personal belongings within reach  - Initiate and maintain comfort rounds  - Make Fall Risk Sign visible to staff  - Offer Toileting every  Hours,  in advance of need  - Initiate/Maintain alarm  - Obtain necessary fall risk management equipment:   - Apply yellow socks and bracelet for high fall risk patients  - Consider moving patient to room near nurses station  Outcome: Progressing  Goal: Maintain or return to baseline ADL function  Description: INTERVENTIONS:  -  Assess patient's ability to carry out ADLs; assess patient's baseline for ADL function and identify physical deficits which impact ability to perform ADLs (bathing, care of mouth/teeth, toileting, grooming, dressing, etc.)  - Assess/evaluate cause of self-care deficits   - Assess range of motion  - Assess patient's mobility; develop plan if impaired  - Assess patient's need for assistive devices and provide as appropriate  - Encourage maximum independence but intervene and supervise when necessary  - Involve family in performance of ADLs  - Assess for home care needs following discharge   - Consider OT consult to assist with ADL evaluation and planning for discharge  - Provide patient education as appropriate  Outcome: Progressing  Goal: Maintains/Returns to pre admission functional level  Description: INTERVENTIONS:  - Perform AM-PAC 6 Click Basic Mobility/ Daily Activity assessment daily.  - Set and communicate daily mobility goal to care team and patient/family/caregiver.   - Collaborate with rehabilitation services on mobility goals if consulted  - Perform Range of Motion 3 times a day.  - Reposition patient every 2 hours.  - Dangle patient 3 times a day  - Stand patient 3 times a day  - Ambulate patient 3 times a day  - Out of bed to chair 3 times a day   - Out of bed for meals 3 times a day  - Out of bed for toileting  - Record patient progress and toleration of activity level   Outcome: Progressing     Problem: DISCHARGE PLANNING  Goal: Discharge to home or other facility with appropriate resources  Description: INTERVENTIONS:  - Identify barriers to discharge w/patient and caregiver  -  Arrange for needed discharge resources and transportation as appropriate  - Identify discharge learning needs (meds, wound care, etc.)  - Arrange for interpretive services to assist at discharge as needed  - Refer to Case Management Department for coordinating discharge planning if the patient needs post-hospital services based on physician/advanced practitioner order or complex needs related to functional status, cognitive ability, or social support system  Outcome: Progressing     Problem: Knowledge Deficit  Goal: Patient/family/caregiver demonstrates understanding of disease process, treatment plan, medications, and discharge instructions  Description: Complete learning assessment and assess knowledge base.  Interventions:  - Provide teaching at level of understanding  - Provide teaching via preferred learning methods  Outcome: Progressing     Problem: Nutrition/Hydration-ADULT  Goal: Nutrient/Hydration intake appropriate for improving, restoring or maintaining nutritional needs  Description: Monitor and assess patient's nutrition/hydration status for malnutrition. Collaborate with interdisciplinary team and initiate plan and interventions as ordered.  Monitor patient's weight and dietary intake as ordered or per policy. Utilize nutrition screening tool and intervene as necessary. Determine patient's food preferences and provide high-protein, high-caloric foods as appropriate.     INTERVENTIONS:  - Monitor oral intake, urinary output, labs, and treatment plans  - Assess nutrition and hydration status and recommend course of action  - Evaluate amount of meals eaten  - Assist patient with eating if necessary   - Allow adequate time for meals  - Recommend/ encourage appropriate diets, oral nutritional supplements, and vitamin/mineral supplements  - Order, calculate, and assess calorie counts as needed  - Recommend, monitor, and adjust tube feedings and TPN/PPN based on assessed needs  - Assess need for intravenous  fluids  - Provide specific nutrition/hydration education as appropriate  - Include patient/family/caregiver in decisions related to nutrition  Outcome: Progressing     Problem: METABOLIC, FLUID AND ELECTROLYTES - ADULT  Goal: Electrolytes maintained within normal limits  Description: INTERVENTIONS:  - Monitor labs and assess patient for signs and symptoms of electrolyte imbalances  - Administer electrolyte replacement as ordered  - Monitor response to electrolyte replacements, including repeat lab results as appropriate  - Instruct patient on fluid and nutrition as appropriate  Outcome: Progressing  Goal: Fluid balance maintained  Description: INTERVENTIONS:  - Monitor labs   - Monitor I/O and WT  - Instruct patient on fluid and nutrition as appropriate  - Assess for signs & symptoms of volume excess or deficit  Outcome: Progressing

## 2024-01-07 NOTE — PLAN OF CARE
Problem: PAIN - ADULT  Goal: Verbalizes/displays adequate comfort level or baseline comfort level  Description: Interventions:  - Encourage patient to monitor pain and request assistance  - Assess pain using appropriate pain scale  - Administer analgesics based on type and severity of pain and evaluate response  - Implement non-pharmacological measures as appropriate and evaluate response  - Consider cultural and social influences on pain and pain management  - Notify physician/advanced practitioner if interventions unsuccessful or patient reports new pain  Outcome: Progressing     Problem: INFECTION - ADULT  Goal: Absence or prevention of progression during hospitalization  Description: INTERVENTIONS:  - Assess and monitor for signs and symptoms of infection  - Monitor lab/diagnostic results  - Monitor all insertion sites, i.e. indwelling lines, tubes, and drains  - Monitor endotracheal if appropriate and nasal secretions for changes in amount and color  - [place] appropriate cooling/warming therapies per order  - Administer medications as ordered  - Instruct and encourage patient and family to use good hand hygiene technique  - Identify and instruct in appropriate isolation precautions for identified infection/condition  Outcome: Progressing  Goal: Absence of fever/infection during neutropenic period  Description: INTERVENTIONS:  - Monitor WBC    Outcome: Progressing     Problem: SAFETY ADULT  Goal: Patient will remain free of falls  Description: INTERVENTIONS:  - Educate patient/family on patient safety including physical limitations  - Instruct patient to call for assistance with activity   - Consult OT/PT to assist with strengthening/mobility   - Keep Call bell within reach  - Keep bed low and locked with side rails adjusted as appropriate  - Keep care items and personal belongings within reach  - Initiate and maintain comfort rounds  - Make Fall Risk Sign visible to staff  - Offer Toileting every 2 Hours,  in advance of need  - Initiate/Maintain bed/chair alarm  - Obtain necessary fall risk management equipment: yellow socks, yellow bracelet  - Apply yellow socks and bracelet for high fall risk patients  - Consider moving patient to room near nurses station  Outcome: Progressing  Goal: Maintain or return to baseline ADL function  Description: INTERVENTIONS:  -  Assess patient's ability to carry out ADLs; assess patient's baseline for ADL function and identify physical deficits which impact ability to perform ADLs (bathing, care of mouth/teeth, toileting, grooming, dressing, etc.)  - Assess/evaluate cause of self-care deficits   - Assess range of motion  - Assess patient's mobility; develop plan if impaired  - Assess patient's need for assistive devices and provide as appropriate  - Encourage maximum independence but intervene and supervise when necessary  - Involve family in performance of ADLs  - Assess for home care needs following discharge   - Consider OT consult to assist with ADL evaluation and planning for discharge  - Provide patient education as appropriate  Outcome: Progressing  Goal: Maintains/Returns to pre admission functional level  Description: INTERVENTIONS:  - Perform AM-PAC 6 Click Basic Mobility/ Daily Activity assessment daily.  - Set and communicate daily mobility goal to care team and patient/family/caregiver.   - Collaborate with rehabilitation services on mobility goals if consulted  - Perform Range of Motion 4 times a day.  - Reposition patient every 3 hours.  - Dangle patient 3 times a day  - Stand patient 3 times a day  - Ambulate patient 3 times a day  - Out of bed to chair 3 times a day   - Out of bed for meals 3 times a day  - Out of bed for toileting  - Record patient progress and toleration of activity level   Outcome: Progressing     Problem: DISCHARGE PLANNING  Goal: Discharge to home or other facility with appropriate resources  Description: INTERVENTIONS:  - Identify barriers to  discharge w/patient and caregiver  - Arrange for needed discharge resources and transportation as appropriate  - Identify discharge learning needs (meds, wound care, etc.)  - Arrange for interpretive services to assist at discharge as needed  - Refer to Case Management Department for coordinating discharge planning if the patient needs post-hospital services based on physician/advanced practitioner order or complex needs related to functional status, cognitive ability, or social support system  Outcome: Progressing     Problem: Knowledge Deficit  Goal: Patient/family/caregiver demonstrates understanding of disease process, treatment plan, medications, and discharge instructions  Description: Complete learning assessment and assess knowledge base.  Interventions:  - Provide teaching at level of understanding  - Provide teaching via preferred learning methods  Outcome: Progressing     Problem: Nutrition/Hydration-ADULT  Goal: Nutrient/Hydration intake appropriate for improving, restoring or maintaining nutritional needs  Description: Monitor and assess patient's nutrition/hydration status for malnutrition. Collaborate with interdisciplinary team and initiate plan and interventions as ordered.  Monitor patient's weight and dietary intake as ordered or per policy. Utilize nutrition screening tool and intervene as necessary. Determine patient's food preferences and provide high-protein, high-caloric foods as appropriate.     INTERVENTIONS:  - Monitor oral intake, urinary output, labs, and treatment plans  - Assess nutrition and hydration status and recommend course of action  - Evaluate amount of meals eaten  - Assist patient with eating if necessary   - Allow adequate time for meals  - Recommend/ encourage appropriate diets, oral nutritional supplements, and vitamin/mineral supplements  - Order, calculate, and assess calorie counts as needed  - Recommend, monitor, and adjust tube feedings and TPN/PPN based on assessed  needs  - Assess need for intravenous fluids  - Provide specific nutrition/hydration education as appropriate  - Include patient/family/caregiver in decisions related to nutrition  Outcome: Progressing     Problem: METABOLIC, FLUID AND ELECTROLYTES - ADULT  Goal: Electrolytes maintained within normal limits  Description: INTERVENTIONS:  - Monitor labs and assess patient for signs and symptoms of electrolyte imbalances  - Administer electrolyte replacement as ordered  - Monitor response to electrolyte replacements, including repeat lab results as appropriate  - Instruct patient on fluid and nutrition as appropriate  Outcome: Progressing  Goal: Fluid balance maintained  Description: INTERVENTIONS:  - Monitor labs   - Monitor I/O and WT  - Instruct patient on fluid and nutrition as appropriate  - Assess for signs & symptoms of volume excess or deficit  Outcome: Progressing     Problem: Prexisting or High Potential for Compromised Skin Integrity  Goal: Skin integrity is maintained or improved  Description: INTERVENTIONS:  - Identify patients at risk for skin breakdown  - Assess and monitor skin integrity  - Assess and monitor nutrition and hydration status  - Monitor labs   - Assess for incontinence   - Turn and reposition patient  - Assist with mobility/ambulation  - Relieve pressure over bony prominences  - Avoid friction and shearing  - Provide appropriate hygiene as needed including keeping skin clean and dry  - Evaluate need for skin moisturizer/barrier cream  - Collaborate with interdisciplinary team   - Patient/family teaching  - Consider wound care consult   Outcome: Progressing

## 2024-01-07 NOTE — PROGRESS NOTES
NEPHROLOGY PROGRESS NOTE   Mateus Mckeon 38 y.o. male MRN: [id number]  Unit/Bed#: ICU 01 Encounter: [id number]      ASSESSMENT & PLAN    38-year-old male with a history of end-stage kidney disease on hemodialysis presented with a headache and was found to have a subarachnoid hemorrhage     End-stage kidney disease on hemodialysis at [facility] Monday Wednesday Friday secondary to diabetic nephropathy  Started dialysis in 2018  Left upper extremity AV fistula  Was on PD in the past and was changed to hemodialysis in December 2020  137 sodium 35 bicarb 2 potassium 2.5 calcium 1 magnesium 100 dextrose with a 37 degree dialysate temperature a blood flow rate of 450 and a prescribed treatment time of 4 hours with an estimated dry weight of 93 kg  Tolerated dialysis without difficulty   Next dialysis treatment is tomorrow.  Will do 3 consecutive days after MRI with gadolinium to reduce risk of NSF although risk is less with class II gadolinium agents     Subarachnoid hemorrhage  Complaining of severe headaches and now in the ICU after CT scan showed a subarachnoid hemorrhage from the ER in [place]  Ongoing management per ICU team and neurosurgery     Hypertension  History of hypertensive emergency and hypertensive urgency in the past  Outpatient records reviewed BP lowering medications include  Off nicardipine drip  Clonidine 0.3 mg weekly  Labetalol 400 mg 3 times daily  Lisinopril 40 mg daily  Nifedipine 90 mg daily  Hydralazine 25 mg 3 times daily     Type 2 diabetes in the setting of end-stage kidney disease  Moderate glycemic control while on dialysis  Continue to monitor     Secondary hyperparathyroidism  On Cinacalcet 60 mg daily, will will placed on 120 mg here but given his nausea and vomiting with decrease the dose and this is his outpatient dose as of mid December  If nausea persists can hold     Hyperphosphatemia  On Velphoro as an outpatient  On calcium acetate here  Can continue this for now      History of hyperkalemia  On Veltassa 5 mg on nondialysis days as an outpatient  His potassium is currently stable    SUBJECTIVE:    States he is tired, but overall stable no acute events overnight    12 point review of systems was otherwise negative besides what is mentioned above.    Medications:    Current Facility-Administered Medications:     acetaminophen (TYLENOL) tablet 975 mg, 975 mg, Oral, Q6H HALIE, Radha Steen MD, 975 mg at 01/07/24 0531    atorvastatin (LIPITOR) tablet 40 mg, 40 mg, Oral, QPM, Juan Woodruff MD, 40 mg at 01/06/24 1800    bisacodyl (DULCOLAX) rectal suppository 10 mg, 10 mg, Rectal, Daily PRN, DEISI Tony    calcium acetate (PHOSLO) capsule 667 mg, 667 mg, Oral, TID With Meals, Juan Woodruff MD, 667 mg at 01/07/24 0850    chlorhexidine (PERIDEX) 0.12 % oral rinse 15 mL, 15 mL, Mouth/Throat, Q12H HALIE, DEISI Tony, 15 mL at 01/07/24 0850    cinacalcet (SENSIPAR) tablet 60 mg, 60 mg, Oral, Daily, Tyler Anand DO, 60 mg at 01/07/24 0850    cloNIDine (CATAPRES-TTS-3) 0.3 mg/24 hr TD weekly patch, 0.3 mg, Transdermal, Weekly, Andressa Marcos MD, 0.3 mg at 01/05/24 0608    escitalopram (LEXAPRO) tablet 20 mg, 20 mg, Oral, Daily, Juan Woodruff MD, 20 mg at 01/07/24 0850    famotidine (PEPCID) tablet 40 mg, 40 mg, Oral, Daily, Juan Woodruff MD, 40 mg at 01/07/24 0850    gabapentin (NEURONTIN) capsule 100 mg, 100 mg, Oral, HS, Juan Woodruff MD, 100 mg at 01/06/24 2143    hydrALAZINE (APRESOLINE) injection 10 mg, 10 mg, Intravenous, Q4H PRN, DEISI Egan    hydrALAZINE (APRESOLINE) tablet 25 mg, 25 mg, Oral, TID, Juan Woodruff MD, 25 mg at 01/07/24 0850    HYDROmorphone HCl (DILAUDID) injection 0.2 mg, 0.2 mg, Intravenous, Q4H PRN, DEISI Egan, 0.2 mg at 01/06/24 1612    insulin lispro (HumaLOG) FOR PUMP REFILLS 300 Units, 300 Units, Subcutaneous Insulin Pump, Daily PRN, Rosemary Rocha DO    labetalol (NORMODYNE) tablet 400 mg, 400 mg, Oral,  TID, Juan Woodruff MD, 400 mg at 01/07/24 0850    lisinopril (ZESTRIL) tablet 40 mg, 40 mg, Oral, Daily, Tyler Anand DO, 40 mg at 01/07/24 0850    metoclopramide (REGLAN) injection 10 mg, 10 mg, Intravenous, Q6H PRN, DEISI Egan, 10 mg at 01/06/24 1614    NIFEdipine (PROCARDIA XL) 24 hr tablet 90 mg, 90 mg, Oral, HS, Juan Woodruff MD, 90 mg at 01/06/24 2143    niMODipine (NIMOTOP) capsule 60 mg, 60 mg, Oral, Q4H HALIE, Andressa Marcos MD, 60 mg at 01/07/24 0850    ondansetron (ZOFRAN) injection 4 mg, 4 mg, Intravenous, Q4H PRN, DEISI Egan, 4 mg at 01/07/24 0915    oxyCODONE (ROXICODONE) split tablet 2.5 mg, 2.5 mg, Oral, Q4H PRN **OR** oxyCODONE (ROXICODONE) IR tablet 5 mg, 5 mg, Oral, Q4H PRN, Hansel Schultz DO    PATIENT MAINTAINED INSULIN PUMP 1 each, 1 each, Subcutaneous, Q8H, Rosemary Rocha DO, 1 each at 01/06/24 2356    prochlorperazine (COMPAZINE) tablet 5 mg, 5 mg, Oral, Q6H PRN, Hansel Schultz DO, 5 mg at 01/06/24 1217    OBJECTIVE:    Vitals:    01/07/24 0500 01/07/24 0600 01/07/24 0700 01/07/24 0800   BP: 146/76 163/78 159/80 165/78   BP Location:    Right arm   Pulse: 70 70 76 74   Resp: 16 15 (!) 25    Temp:    99.1 °F (37.3 °C)   TempSrc:    Oral   SpO2: 97% 98% 98% 98%   Weight:       Height:            Temp:  [99 °F (37.2 °C)-99.1 °F (37.3 °C)] 99.1 °F (37.3 °C)  HR:  [70-80] 74  Resp:  [13-25] 25  BP: (114-165)/(59-83) 165/78  SpO2:  [97 %-100 %] 98 %     Body mass index is 34.95 kg/m².    Weight (last 2 days)       Date/Time Weight    01/05/24 0731 95.3 (210)    01/05/24 0615 95.3 (210.1)            I/O last 3 completed shifts:  In: 1998.3 [P.O.:450; I.V.:1548.3]  Out: -     No intake/output data recorded.      Physical exam:    General: no acute distress, cooperative  Eyes: conjunctivae pink, anicteric sclerae  ENT: lips and mucous membranes moist, no exudates, normal external ears  Neck: ROM intact, no JVD  Chest: No respiratory distress, no  "accessory muscle use  CVS: normal rate, non pericardial friction rub  Abdomen: soft, non-tender, non-distended, normoactive bowel sounds  Extremities: no edema of both legs  Skin: no rash  Neuro: awake, alert, oriented, grossly intact  Psych:  Pleasant affect    Invasive Devices:      Lab Results:   Results from last 7 days   Lab Units 01/07/24  0541 01/06/24  0527 01/05/24  0548 01/04/24  2243 01/03/24  0934   WBC Thousand/uL 9.89 10.25*  --  8.46 7.16   HEMOGLOBIN g/dL 9.1* 9.6*  --  11.5* 10.7*   HEMATOCRIT % 27.8* 29.2*  --  34.3* 32.1*   PLATELETS Thousands/uL 195 208  --  210 215   POTASSIUM mmol/L 4.2 4.9 4.7 5.3 4.5   CHLORIDE mmol/L 97 95*  --  91* 93*   CO2 mmol/L 25 26  --  27 34*   BUN mg/dL 45* 38*  --  56* 36*   CREATININE mg/dL 12.62* 10.27*  --  13.49* 9.31*   CALCIUM mg/dL 7.8* 9.1  --  9.0 8.4   MAGNESIUM mg/dL 2.1 2.2  --   --   --    PHOSPHORUS mg/dL 6.0* 6.1*  --   --   --    ALK PHOS U/L  --   --   --  79 74   ALT U/L  --   --   --  10 10   AST U/L  --   --   --  15 15         Portions of the record may have been created with voice recognition software. Occasional wrong word or \"sound a like\" substitutions may have occurred due to the inherent limitations of voice recognition software. Read the chart carefully and recognize, using context, where substitutions have occurred.If you have any questions, please contact the dictating provider.    "

## 2024-01-07 NOTE — SPEECH THERAPY NOTE
Speech-Language Pathology Bedside Swallow Evaluation      Patient Name: Mateus Mckeon    Today's Date: 1/7/2024     Problem List  Principal Problem:    Subarachnoid hemorrhage (HCC)  Active Problems:    End stage kidney disease (HCC)    Anemia due to chronic kidney disease, on chronic dialysis (HCC)      Past Medical History  Past Medical History:   Diagnosis Date    Acute kidney injury (HCC)     Ambulates with cane     Anuria     Anxiety     Cellulitis of right elbow 03/31/2021    Chronic kidney disease     Depression     Diabetes mellitus (HCC)     Diarrhea     Emesis 10/24/2020    End stage renal disease (HCC) 02/11/2018    Formatting of this note might be different from the original. Last Assessment & Plan:  Secondary to DM.  On nightly PD.  Followed by Nephro.  Patient considering transplant for kidney and pancreas through [facility] Formatting of this note might be different from the original. Last Assessment & Plan:  Formatting of this note might be different from the original. 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    Esophagitis 07/21/2015    Falls     Gastroparesis     GERD (gastroesophageal reflux disease)     History of shingles 2010    History of transfusion 02/2018    no adverse reaction    Hyperlipidemia     Hyperphosphatemia     Hypertension     Hypoglycemia 07/15/2022    Itching     Mastoiditis of right side 07/15/2022    Muscle weakness     general unsteadiness    Obesity (BMI 30.0-34.9) 09/09/2019    Orthostatic hypotension 10/25/2020    Peripheral polyneuropathy 11/20/2019    PONV (postoperative nausea and vomiting) 01/26/2018    Protein-calorie malnutrition (HCC) 11/23/2020    Recurrent peritonitis (HCC) due to peritoneal dialysis catheter 07/31/2020    Retinopathy     Seizures (HCC)     early 2020 - one time    Skin abnormality     some dime size areas where skin was scratched from itching    Spontaneous bacterial peritonitis (HCC) 10/19/2020     Squamous cell skin cancer     left temple    Stroke (HCC)     x2 - off balance/no driving/fatigue    Swelling of both lower extremities     Traumatic onycholysis 07/21/2022    Vomiting     Wears glasses        Past Surgical History  Past Surgical History:   Procedure Laterality Date    CARDIAC ELECTROPHYSIOLOGY PROCEDURE N/A 9/21/2023    Procedure: Cardiac loop recorder explant;  Surgeon: Parish Morgan MD;  [facility];  Service: Cardiology    CARDIAC LOOP RECORDER  05/2018    COLONOSCOPY      EGD      EYE SURGERY Right     IR AV FISTULAGRAM/GRAFTOGRAM  02/23/2021    IR TUNNELED CENTRAL LINE PLACEMENT  02/16/2021    IR TUNNELED DIALYSIS CATHETER PLACEMENT  11/18/2020    IR TUNNELED DIALYSIS CATHETER REMOVAL  02/12/2021    IR TUNNELED DIALYSIS CATHETER REMOVAL  03/11/2021    MOHS SURGERY Left 12/14/2022    Left temple with Dr. Hassan    PERITONEAL CATHETER INSERTION N/A 08/27/2018    Procedure: UNROOF PD CATHETER;  Surgeon: Felipe Lindo DO;  [facility];  Service: General    SD ARTERIOVENOUS ANASTOMOSIS OPEN DIRECT Left 11/09/2020    Procedure: CREATION FISTULA  ARTERIOVENOUS (AV) - LEFT WRIST;  Surgeon: Placido Altamirano MD;  [facility];  Service: Vascular    SD ESOPHAGOGASTRODUODENOSCOPY TRANSORAL DIAGNOSTIC N/A 04/18/2019    Procedure: ESOPHAGOGASTRODUODENOSCOPY (EGD);  Surgeon: Ale Figueroa MD;  [facility]: [facility];  Service: Gastroenterology    SD LAPS INSERTION TUNNELED INTRAPERITONEAL CATHETER N/A 08/06/2018    Procedure: LAPAROSCOPIC PD CATHETER PLACEMENT;  Surgeon: Felipe Lindo DO;  [facility];  Service: General    SD REMOVAL TUNNELED INTRAPERITONEAL CATHETER N/A 11/18/2020    Procedure: REMOVAL CATHETER PERITONEAL DIALYSIS;  Surgeon: Abdifatah Ty MD;  [facility];  Service: General    TONSILLECTOMY      UPPER GASTROINTESTINAL ENDOSCOPY         Summary   Pt presented with functional appearing oral and pharyngeal stage swallowing skills with  materials administered today. The patient is assessed with regular solids and thin liquids. Mastication is min prolonged, but efficient. No overt s/s aspiration observed with trials.     Risk/s for Aspiration: low      Recommended Diet: regular diet and thin liquids   Recommended Form of Meds: whole with liquid   Aspiration precautions and swallowing strategies: upright posture and small bites/sips  Other Recommendations: Continue frequent oral care    Current Medical Status  Pleasant 39yo with ESRD, HTN, old stroke(2015/2018) on ASA presents to [facility] with severe headache since last Sunday. Associated nausea and vomiting. Seen in same ED on Wednesday and sent home. Neuroimaging showed SAH in right basilar cisterns       Allergies:  No known food allergies  Past medical history:  Please see H&P for details    Social/Education/Vocational Hx:  Pt lives with family    Swallow Information   Current Risks for Dysphagia & Aspiration:  none  Current Symptoms/Concerns:  none  Current Diet: regular diet and thin liquids   Baseline Diet: regular diet and thin liquids    Baseline Assessment   Behavior/Cognition: alert  Speech/Language Status: able to participate in conversation and able to follow commands  Patient Positioning: upright in bed  Pain Status/Interventions/Response to Interventions: No report of or nonverbal indications of pain.     Swallow Mechanism Exam  Facial: symmetrical  Not formally assessed but no asymmetry or weakness observed. Patient has natural dentition and is on RA    Consistencies Assessed and Performance   Consistencies Administered: thin liquids and hard solids  Materials administered included cookie and ginger ale    Oral Stage: WFL  Mastication was min prolonged, but efficient with the materials administered today.  Bolus formation and transfer were functional with no significant oral residue noted.  No overt s/s reduced oral control.    Pharyngeal Stage: WFL  Swallow Mechanics:   Swallowing initiation appeared prompt.  Laryngeal rise was observed and judged to be within functional limits.  No coughing, throat clearing, change in vocal quality or respiratory status noted today.     Esophageal Concerns: none reported    Summary and Recommendations (see above)    Results Reviewed with: patient, RN, and family     Treatment Recommended: evaluation only

## 2024-01-08 ENCOUNTER — APPOINTMENT (INPATIENT)
Dept: NON INVASIVE DIAGNOSTICS | Facility: HOSPITAL | Age: 39
DRG: 064 | End: 2024-01-08
Payer: MEDICARE

## 2024-01-08 ENCOUNTER — APPOINTMENT (INPATIENT)
Dept: DIALYSIS | Facility: HOSPITAL | Age: 39
DRG: 064 | End: 2024-01-08
Payer: MEDICARE

## 2024-01-08 ENCOUNTER — APPOINTMENT (OUTPATIENT)
Dept: RADIOLOGY | Facility: HOSPITAL | Age: 39
DRG: 064 | End: 2024-01-08
Payer: MEDICARE

## 2024-01-08 LAB
ANION GAP SERPL CALCULATED.3IONS-SCNC: 23 MMOL/L
BASOPHILS # BLD AUTO: 0.1 THOUSANDS/ÂΜL (ref 0–0.1)
BASOPHILS NFR BLD AUTO: 1 % (ref 0–1)
BUN SERPL-MCNC: 62 MG/DL (ref 5–25)
CALCIUM SERPL-MCNC: 8.3 MG/DL (ref 8.4–10.2)
CHLORIDE SERPL-SCNC: 93 MMOL/L (ref 96–108)
CO2 SERPL-SCNC: 19 MMOL/L (ref 21–32)
CREAT SERPL-MCNC: 14.28 MG/DL (ref 0.6–1.3)
EOSINOPHIL # BLD AUTO: 0.35 THOUSAND/ÂΜL (ref 0–0.61)
EOSINOPHIL NFR BLD AUTO: 3 % (ref 0–6)
ERYTHROCYTE [DISTWIDTH] IN BLOOD BY AUTOMATED COUNT: 13.9 % (ref 11.6–15.1)
GFR SERPL CREATININE-BSD FRML MDRD: 3 ML/MIN/1.73SQ M
GLUCOSE SERPL-MCNC: 102 MG/DL (ref 65–140)
GLUCOSE SERPL-MCNC: 136 MG/DL (ref 65–140)
GLUCOSE SERPL-MCNC: 205 MG/DL (ref 65–140)
GLUCOSE SERPL-MCNC: 236 MG/DL (ref 65–140)
HCT VFR BLD AUTO: 30.1 % (ref 36.5–49.3)
HGB BLD-MCNC: 10.3 G/DL (ref 12–17)
IMM GRANULOCYTES # BLD AUTO: 0.15 THOUSAND/UL (ref 0–0.2)
IMM GRANULOCYTES NFR BLD AUTO: 1 % (ref 0–2)
LYMPHOCYTES # BLD AUTO: 1.12 THOUSANDS/ÂΜL (ref 0.6–4.47)
LYMPHOCYTES NFR BLD AUTO: 9 % (ref 14–44)
MAGNESIUM SERPL-MCNC: 2.3 MG/DL (ref 1.9–2.7)
MCH RBC QN AUTO: 31.9 PG (ref 26.8–34.3)
MCHC RBC AUTO-ENTMCNC: 34.2 G/DL (ref 31.4–37.4)
MCV RBC AUTO: 93 FL (ref 82–98)
MONOCYTES # BLD AUTO: 0.74 THOUSAND/ÂΜL (ref 0.17–1.22)
MONOCYTES NFR BLD AUTO: 6 % (ref 4–12)
NEUTROPHILS # BLD AUTO: 10.58 THOUSANDS/ÂΜL (ref 1.85–7.62)
NEUTS SEG NFR BLD AUTO: 80 % (ref 43–75)
NRBC BLD AUTO-RTO: 0 /100 WBCS
PHOSPHATE SERPL-MCNC: 6.1 MG/DL (ref 2.7–4.5)
PLATELET # BLD AUTO: 217 THOUSANDS/UL (ref 149–390)
PMV BLD AUTO: 11 FL (ref 8.9–12.7)
POTASSIUM SERPL-SCNC: 5.2 MMOL/L (ref 3.5–5.3)
RBC # BLD AUTO: 3.23 MILLION/UL (ref 3.88–5.62)
SODIUM SERPL-SCNC: 135 MMOL/L (ref 135–147)
WBC # BLD AUTO: 13.04 THOUSAND/UL (ref 4.31–10.16)

## 2024-01-08 PROCEDURE — 70553 MRI BRAIN STEM W/O & W/DYE: CPT

## 2024-01-08 PROCEDURE — 72156 MRI NECK SPINE W/O & W/DYE: CPT

## 2024-01-08 PROCEDURE — 85025 COMPLETE CBC W/AUTO DIFF WBC: CPT

## 2024-01-08 PROCEDURE — 83735 ASSAY OF MAGNESIUM: CPT

## 2024-01-08 PROCEDURE — 5A1D70Z PERFORMANCE OF URINARY FILTRATION, INTERMITTENT, LESS THAN 6 HOURS PER DAY: ICD-10-PCS | Performed by: INTERNAL MEDICINE

## 2024-01-08 PROCEDURE — 84207 ASSAY OF VITAMIN B-6: CPT

## 2024-01-08 PROCEDURE — 93886 INTRACRANIAL COMPLETE STUDY: CPT

## 2024-01-08 PROCEDURE — 84425 ASSAY OF VITAMIN B-1: CPT

## 2024-01-08 PROCEDURE — A9585 GADOBUTROL INJECTION: HCPCS | Performed by: INTERNAL MEDICINE

## 2024-01-08 PROCEDURE — 93886 INTRACRANIAL COMPLETE STUDY: CPT | Performed by: SURGERY

## 2024-01-08 PROCEDURE — 94760 N-INVAS EAR/PLS OXIMETRY 1: CPT

## 2024-01-08 PROCEDURE — 0HBRXZZ EXCISION OF TOE NAIL, EXTERNAL APPROACH: ICD-10-PCS | Performed by: PODIATRIST

## 2024-01-08 PROCEDURE — 80048 BASIC METABOLIC PNL TOTAL CA: CPT

## 2024-01-08 PROCEDURE — 99221 1ST HOSP IP/OBS SF/LOW 40: CPT | Performed by: PODIATRIST

## 2024-01-08 PROCEDURE — 82948 REAGENT STRIP/BLOOD GLUCOSE: CPT

## 2024-01-08 PROCEDURE — 11721 DEBRIDE NAIL 6 OR MORE: CPT | Performed by: PODIATRIST

## 2024-01-08 PROCEDURE — 94660 CPAP INITIATION&MGMT: CPT

## 2024-01-08 PROCEDURE — 87081 CULTURE SCREEN ONLY: CPT | Performed by: INTERNAL MEDICINE

## 2024-01-08 PROCEDURE — 99232 SBSQ HOSP IP/OBS MODERATE 35: CPT | Performed by: NURSE PRACTITIONER

## 2024-01-08 PROCEDURE — 99291 CRITICAL CARE FIRST HOUR: CPT | Performed by: PSYCHIATRY & NEUROLOGY

## 2024-01-08 PROCEDURE — 99233 SBSQ HOSP IP/OBS HIGH 50: CPT | Performed by: INTERNAL MEDICINE

## 2024-01-08 PROCEDURE — 84100 ASSAY OF PHOSPHORUS: CPT

## 2024-01-08 RX ORDER — NICARDIPINE HYDROCHLORIDE 2.5 MG/ML
INJECTION INTRAVENOUS
Status: COMPLETED
Start: 2024-01-08 | End: 2024-01-08

## 2024-01-08 RX ORDER — CLONIDINE HYDROCHLORIDE 0.2 MG/1
0.2 TABLET ORAL EVERY 12 HOURS SCHEDULED
Status: CANCELLED | OUTPATIENT
Start: 2024-01-08

## 2024-01-08 RX ORDER — LABETALOL HYDROCHLORIDE 5 MG/ML
10 INJECTION, SOLUTION INTRAVENOUS EVERY 6 HOURS PRN
Status: DISCONTINUED | OUTPATIENT
Start: 2024-01-08 | End: 2024-01-15

## 2024-01-08 RX ORDER — LISINOPRIL 20 MG/1
40 TABLET ORAL 2 TIMES DAILY
Status: DISCONTINUED | OUTPATIENT
Start: 2024-01-08 | End: 2024-01-11

## 2024-01-08 RX ORDER — GADOBUTROL 604.72 MG/ML
9 INJECTION INTRAVENOUS
Status: COMPLETED | OUTPATIENT
Start: 2024-01-08 | End: 2024-01-08

## 2024-01-08 RX ORDER — AMOXICILLIN 250 MG
1 CAPSULE ORAL 2 TIMES DAILY
Status: DISCONTINUED | OUTPATIENT
Start: 2024-01-08 | End: 2024-01-23 | Stop reason: HOSPADM

## 2024-01-08 RX ORDER — LISINOPRIL 20 MG/1
40 TABLET ORAL 2 TIMES DAILY
Status: CANCELLED | OUTPATIENT
Start: 2024-01-09 | End: 2024-01-10

## 2024-01-08 RX ADMIN — OXYCODONE HYDROCHLORIDE 5 MG: 5 TABLET ORAL at 16:02

## 2024-01-08 RX ADMIN — HYDROMORPHONE HYDROCHLORIDE 0.2 MG: 0.2 INJECTION, SOLUTION INTRAMUSCULAR; INTRAVENOUS; SUBCUTANEOUS at 11:53

## 2024-01-08 RX ADMIN — NIMODIPINE 60 MG: 30 CAPSULE, LIQUID FILLED ORAL at 04:04

## 2024-01-08 RX ADMIN — NICARDIPINE HYDROCHLORIDE 5 MG/HR: 2.5 INJECTION, SOLUTION INTRAVENOUS at 18:28

## 2024-01-08 RX ADMIN — LABETALOL HYDROCHLORIDE 400 MG: 200 TABLET, FILM COATED ORAL at 20:38

## 2024-01-08 RX ADMIN — NIMODIPINE 60 MG: 30 CAPSULE, LIQUID FILLED ORAL at 15:31

## 2024-01-08 RX ADMIN — ATORVASTATIN CALCIUM 40 MG: 40 TABLET, FILM COATED ORAL at 17:21

## 2024-01-08 RX ADMIN — CALCIUM ACETATE 667 MG: 667 CAPSULE ORAL at 08:20

## 2024-01-08 RX ADMIN — SENNOSIDES, DOCUSATE SODIUM 1 TABLET: 8.6; 5 TABLET ORAL at 17:21

## 2024-01-08 RX ADMIN — LISINOPRIL 40 MG: 20 TABLET ORAL at 17:58

## 2024-01-08 RX ADMIN — NICARDIPINE HYDROCHLORIDE 2.5 MG/HR: 2.5 INJECTION, SOLUTION INTRAVENOUS at 18:18

## 2024-01-08 RX ADMIN — METOCLOPRAMIDE HYDROCHLORIDE 10 MG: 5 INJECTION INTRAMUSCULAR; INTRAVENOUS at 08:24

## 2024-01-08 RX ADMIN — ESCITALOPRAM OXALATE 20 MG: 20 TABLET ORAL at 08:20

## 2024-01-08 RX ADMIN — CINACALCET 60 MG: 30 TABLET ORAL at 08:21

## 2024-01-08 RX ADMIN — HYDRALAZINE HYDROCHLORIDE 25 MG: 25 TABLET, FILM COATED ORAL at 20:38

## 2024-01-08 RX ADMIN — GABAPENTIN 100 MG: 100 CAPSULE ORAL at 08:20

## 2024-01-08 RX ADMIN — HYDROMORPHONE HYDROCHLORIDE 0.2 MG: 0.2 INJECTION, SOLUTION INTRAMUSCULAR; INTRAVENOUS; SUBCUTANEOUS at 06:12

## 2024-01-08 RX ADMIN — POLYETHYLENE GLYCOL 3350 17 G: 17 POWDER, FOR SOLUTION ORAL at 08:20

## 2024-01-08 RX ADMIN — OXYCODONE HYDROCHLORIDE 5 MG: 5 TABLET ORAL at 08:24

## 2024-01-08 RX ADMIN — NIMODIPINE 60 MG: 30 CAPSULE, LIQUID FILLED ORAL at 20:38

## 2024-01-08 RX ADMIN — FAMOTIDINE 40 MG: 20 TABLET, FILM COATED ORAL at 08:20

## 2024-01-08 RX ADMIN — CHLORHEXIDINE GLUCONATE 15 ML: 1.2 SOLUTION ORAL at 08:20

## 2024-01-08 RX ADMIN — NIMODIPINE 60 MG: 30 CAPSULE, LIQUID FILLED ORAL at 11:53

## 2024-01-08 RX ADMIN — HYDRALAZINE HYDROCHLORIDE 10 MG: 20 INJECTION, SOLUTION INTRAMUSCULAR; INTRAVENOUS at 15:04

## 2024-01-08 RX ADMIN — GABAPENTIN 100 MG: 100 CAPSULE ORAL at 15:31

## 2024-01-08 RX ADMIN — ACETAMINOPHEN 975 MG: 325 TABLET, FILM COATED ORAL at 00:02

## 2024-01-08 RX ADMIN — HYDRALAZINE HYDROCHLORIDE 10 MG: 20 INJECTION, SOLUTION INTRAMUSCULAR; INTRAVENOUS at 00:06

## 2024-01-08 RX ADMIN — NIMODIPINE 60 MG: 30 CAPSULE, LIQUID FILLED ORAL at 23:57

## 2024-01-08 RX ADMIN — NICARDIPINE HYDROCHLORIDE 10 MG/HR: 2.5 INJECTION, SOLUTION INTRAVENOUS at 21:40

## 2024-01-08 RX ADMIN — ONDANSETRON 4 MG: 2 INJECTION INTRAMUSCULAR; INTRAVENOUS at 00:05

## 2024-01-08 RX ADMIN — GABAPENTIN 100 MG: 100 CAPSULE ORAL at 20:38

## 2024-01-08 RX ADMIN — LABETALOL HYDROCHLORIDE 400 MG: 200 TABLET, FILM COATED ORAL at 15:31

## 2024-01-08 RX ADMIN — ONDANSETRON 4 MG: 2 INJECTION INTRAMUSCULAR; INTRAVENOUS at 11:53

## 2024-01-08 RX ADMIN — LISINOPRIL 40 MG: 20 TABLET ORAL at 08:20

## 2024-01-08 RX ADMIN — ACETAMINOPHEN 975 MG: 325 TABLET, FILM COATED ORAL at 06:13

## 2024-01-08 RX ADMIN — NIFEDIPINE 90 MG: 30 TABLET, FILM COATED, EXTENDED RELEASE ORAL at 21:22

## 2024-01-08 RX ADMIN — NICARDIPINE HYDROCHLORIDE: 2.5 INJECTION, SOLUTION INTRAVENOUS at 18:18

## 2024-01-08 RX ADMIN — NIMODIPINE 60 MG: 30 CAPSULE, LIQUID FILLED ORAL at 08:20

## 2024-01-08 RX ADMIN — GADOBUTROL 9 ML: 604.72 INJECTION INTRAVENOUS at 05:30

## 2024-01-08 RX ADMIN — HYDRALAZINE HYDROCHLORIDE 25 MG: 25 TABLET, FILM COATED ORAL at 08:20

## 2024-01-08 RX ADMIN — CHLORHEXIDINE GLUCONATE 15 ML: 1.2 SOLUTION ORAL at 20:38

## 2024-01-08 RX ADMIN — CALCIUM ACETATE 667 MG: 667 CAPSULE ORAL at 15:32

## 2024-01-08 RX ADMIN — ACETAMINOPHEN 975 MG: 325 TABLET, FILM COATED ORAL at 23:57

## 2024-01-08 RX ADMIN — Medication 10 MG: at 17:08

## 2024-01-08 RX ADMIN — HYDROMORPHONE HYDROCHLORIDE 0.2 MG: 0.2 INJECTION, SOLUTION INTRAMUSCULAR; INTRAVENOUS; SUBCUTANEOUS at 17:26

## 2024-01-08 RX ADMIN — LABETALOL HYDROCHLORIDE 400 MG: 200 TABLET, FILM COATED ORAL at 08:19

## 2024-01-08 RX ADMIN — HYDRALAZINE HYDROCHLORIDE 25 MG: 25 TABLET, FILM COATED ORAL at 15:31

## 2024-01-08 RX ADMIN — CALCIUM ACETATE 667 MG: 667 CAPSULE ORAL at 11:53

## 2024-01-08 RX ADMIN — Medication 2.5 MG: at 04:06

## 2024-01-08 NOTE — CONSULTS
OTOLARYNGOLOGY CONSULT    Date of Service: 1/8/2024    Reason for consult: R mastoid effusion     ASSESSMENT/PLAN:    -R mastoid opacification likely incidental finding  -no signs of infection, other concerning features observed on PE  -no acute ENT interventions indicated at this time  -rest of care per primary    Please reach out to ENT again if additional questions/concerns arise during this admission    HPI  Mateus Mckeon is a 38 y.o. male w/ PMH of ESRD, T1DM, HTN who presented to ED on 1/4/24 w/ headache, found to have R basal cisterns SAH on CT head. Family endorsed childhood rAOM for pt, no other previous otic concerns. No hearing difficulty, imbalance, otalgia, or otorrhea prior to presentation. ENT was consulted for opacification of R mastoid air cells found on imaging.     LABORATORY  WBC (1/8/24): 13.04 (9.89, 10.25, 8.46)    RADIOLOGY  Brain MRI (1/8/24):  Stable hematoma in the right CP angle, prepontine and premedullary cisterns. New subarachnoid hemorrhage over the convexities and within the lateral ventricles likely due to redistribution.  Few tiny scattered recent infarcts in multiple vascular distributions.    CT head (1/5/24):  1.  Right prepontine/premedullary cistern subarachnoid hemorrhage. Minimal mass effect on the right middle cerebellar peduncle. Short-term follow-up is recommended.  2.  Opacification of the right mastoid air cells    ENT resident read: L mastoid & middle ear space clear, R mastoid opacification w/o signs of bony erosion    PROCEDURES  -    CURRENT HOSPITAL MEDICATIONS  Current Facility-Administered Medications   Medication Dose Route Frequency Provider Last Rate Last Admin    acetaminophen (TYLENOL) tablet 975 mg  975 mg Oral Q6H [facility] Radha Steen MD   975 mg at 01/08/24 0613    atorvastatin (LIPITOR) tablet 40 mg  40 mg Oral QPM Juan Woodruff MD   40 mg at 01/07/24 1802    bisacodyl (DULCOLAX) rectal suppository 10 mg  10 mg Rectal Daily PRN DEISI Tony         calcium acetate (PHOSLO) capsule 667 mg  667 mg Oral TID With Meals Juan Woodruff MD   667 mg at 01/08/24 1532    chlorhexidine (PERIDEX) 0.12 % oral rinse 15 mL  15 mL Mouth/Throat Q12H DEISI Rodrigues   15 mL at 01/08/24 0820    cinacalcet (SENSIPAR) tablet 60 mg  60 mg Oral Daily Tyler Anand, DO   60 mg at 01/08/24 0821    cloNIDine (CATAPRES-TTS-3) 0.3 mg/24 hr TD weekly patch  0.3 mg Transdermal Weekly Andressa Marcos MD   0.3 mg at 01/05/24 0608    escitalopram (LEXAPRO) tablet 20 mg  20 mg Oral Daily Juan Woodruff MD   20 mg at 01/08/24 0820    famotidine (PEPCID) tablet 40 mg  40 mg Oral Daily Juan Woodruff MD   40 mg at 01/08/24 0820    gabapentin (NEURONTIN) capsule 100 mg  100 mg Oral TID Radha Steen MD   100 mg at 01/08/24 1531    hydrALAZINE (APRESOLINE) injection 10 mg  10 mg Intravenous Q4H PRN DEISI Egan   10 mg at 01/08/24 1504    hydrALAZINE (APRESOLINE) tablet 25 mg  25 mg Oral TID Juan Woodruff MD   25 mg at 01/08/24 1531    HYDROmorphone HCl (DILAUDID) injection 0.2 mg  0.2 mg Intravenous Q4H PRN Hansel Schultz DO   0.2 mg at 01/08/24 1153    insulin lispro (HumaLOG) FOR PUMP REFILLS 300 Units  300 Units Subcutaneous Insulin Pump Daily PRN Rosemary Rocha DO        labetalol (NORMODYNE) injection 10 mg  10 mg Intravenous Q6H PRN Hansel Schultz DO        labetalol (NORMODYNE) tablet 400 mg  400 mg Oral TID Juan Woodruff MD   400 mg at 01/08/24 1531    lisinopril (ZESTRIL) tablet 40 mg  40 mg Oral Daily Tyler Anand, DO   40 mg at 01/08/24 0820    metoclopramide (REGLAN) injection 10 mg  10 mg Intravenous Q6H PRN DEISI Egan   10 mg at 01/08/24 0824    NIFEdipine (PROCARDIA XL) 24 hr tablet 90 mg  90 mg Oral HS Juan Woodruff MD   90 mg at 01/07/24 2157    niMODipine (NIMOTOP) capsule 60 mg  60 mg Oral Q4H [facility] Andressa Marcos MD   60 mg at 01/08/24 1531    ondansetron (ZOFRAN) injection 4 mg  4 mg Intravenous Q4H PRN Pat Hirsch  DEISI Rios   4 mg at 01/08/24 1153    oxyCODONE (ROXICODONE) split tablet 2.5 mg  2.5 mg Oral Q4H PRN Hansel Schultz, DO   2.5 mg at 01/08/24 0406    Or    oxyCODONE (ROXICODONE) IR tablet 5 mg  5 mg Oral Q4H PRN Hansel Schultz, DO   5 mg at 01/08/24 1602    PATIENT MAINTAINED INSULIN PUMP 1 each  1 each Subcutaneous Q8H Rosemary Rocha, DO   1 each at 01/08/24 1407    polyethylene glycol (MIRALAX) packet 17 g  17 g Oral Daily Radha Steen MD   17 g at 01/08/24 0820    prochlorperazine (COMPAZINE) tablet 5 mg  5 mg Oral Q6H PRN Hansel Schultz, DO   5 mg at 01/06/24 1217    senna-docusate sodium (SENOKOT S) 8.6-50 mg per tablet 1 tablet  1 tablet Oral BID Cinthia Dempsey MD           REVIEW OF SYSTEMS  As above    HISTORIES  PMH:  Past Medical History:   Diagnosis Date    Acute kidney injury (HCC)     Ambulates with cane     Anuria     Anxiety     Cellulitis of right elbow 03/31/2021    Chronic kidney disease     Depression     Diabetes mellitus (HCC)     Diarrhea     Emesis 10/24/2020    End stage renal disease (HCC) 02/11/2018    Formatting of this note might be different from the original. Last Assessment & Plan:  Secondary to DM.  On nightly PD.  Followed by Nephro.  [facility] Formatting of this note might be different from the original. Last Assessment & Plan:  Formatting of this note might be different from the original. 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    Esophagitis 07/21/2015    Falls     Gastroparesis     GERD (gastroesophageal reflux disease)     History of shingles 2010    History of transfusion 02/2018    no adverse reaction    Hyperlipidemia     Hyperphosphatemia     Hypertension     Hypoglycemia 07/15/2022    Itching     Mastoiditis of right side 07/15/2022    Muscle weakness     general unsteadiness    Obesity (BMI 30.0-34.9) 09/09/2019    Orthostatic hypotension 10/25/2020     Peripheral polyneuropathy 11/20/2019    PONV (postoperative nausea and vomiting) 01/26/2018    Protein-calorie malnutrition (HCC) 11/23/2020    Recurrent peritonitis (HCC) due to peritoneal dialysis catheter 07/31/2020    Retinopathy     Seizures (HCC)     early 2020 - one time    Skin abnormality     some dime size areas where skin was scratched from itching    Spontaneous bacterial peritonitis (HCC) 10/19/2020    Squamous cell skin cancer     left temple    Stroke (HCC)     x2 - off balance/no driving/fatigue    Swelling of both lower extremities     Traumatic onycholysis 07/21/2022    Vomiting     Wears glasses        PSH:  Past Surgical History:   Procedure Laterality Date    CARDIAC ELECTROPHYSIOLOGY PROCEDURE N/A 9/21/2023    Procedure: Cardiac loop recorder explant;  Surgeon: Parish Morgan MD;  [facility];  Service: Cardiology    CARDIAC LOOP RECORDER  05/2018    COLONOSCOPY      EGD      EYE SURGERY Right     IR AV FISTULAGRAM/GRAFTOGRAM  02/23/2021    IR CEREBRAL ANGIOGRAPHY / INTERVENTION  1/5/2024    IR TUNNELED CENTRAL LINE PLACEMENT  02/16/2021    IR TUNNELED DIALYSIS CATHETER PLACEMENT  11/18/2020    IR TUNNELED DIALYSIS CATHETER REMOVAL  02/12/2021    IR TUNNELED DIALYSIS CATHETER REMOVAL  03/11/2021    MOHS SURGERY Left 12/14/2022    Left temple with Dr. Hassan    PERITONEAL CATHETER INSERTION N/A 08/27/2018    Procedure: UNROOF PD CATHETER;  Surgeon: Felipe Lindo DO;  [facility];  Service: General    AR ARTERIOVENOUS ANASTOMOSIS OPEN DIRECT Left 11/09/2020    Procedure: CREATION FISTULA  ARTERIOVENOUS (AV) - LEFT WRIST;  Surgeon: Placido Altamirano MD;  [facility];  Service: Vascular    AR ESOPHAGOGASTRODUODENOSCOPY TRANSORAL DIAGNOSTIC N/A 04/18/2019    Procedure: ESOPHAGOGASTRODUODENOSCOPY (EGD);  Surgeon: Ale Figueroa MD;  [facility];  Service: Gastroenterology    AR LAPS INSERTION TUNNELED INTRAPERITONEAL CATHETER N/A 08/06/2018    Procedure:  "LAPAROSCOPIC PD CATHETER PLACEMENT;  Surgeon: Felipe Lindo DO;  [facility];  Service: General    SC REMOVAL TUNNELED INTRAPERITONEAL CATHETER N/A 2020    Procedure: REMOVAL CATHETER PERITONEAL DIALYSIS;  Surgeon: Abdifatah Ty MD;  [facility];  Service: General    TONSILLECTOMY      UPPER GASTROINTESTINAL ENDOSCOPY         SH:  Social History     Tobacco Use    Smoking status: Former     Current packs/day: 0.00     Average packs/day: 0.5 packs/day for 12.0 years (6.0 ttl pk-yrs)     Types: Cigarettes     Start date: 2006     Quit date: 2018     Years since quittin.9    Smokeless tobacco: Never    Tobacco comments:     quit 2018   Vaping Use    Vaping status: Every Day    Substances: THC, CBD   Substance Use Topics    Alcohol use: Not Currently    Drug use: Yes     Types: Marijuana     Comment: medical marijuana       FH:  Family History   Problem Relation Age of Onset    Breast cancer Mother     Hypertension Mother     Hyperlipidemia Father     Hypertension Father     Leukemia Maternal Grandmother     Hyperlipidemia Maternal Grandfather     Hypertension Maternal Grandfather     Hyperlipidemia Paternal Grandmother     Hypertension Paternal Grandmother     Heart disease Paternal Grandfather         cardiac disorder    Diabetes Paternal Grandfather        ALLERGIES:  Allergies   Allergen Reactions    Sulfa Antibiotics Rash       PHYSICAL EXAM  Visit Vitals  BP (!) 189/92   Pulse 78   Temp 99.4 °F (37.4 °C) (Oral)   Resp (!) 24   Ht 5' 5\" (1.651 m)   Wt 96.8 kg (213 lb 6.5 oz)   SpO2 97%   BMI 35.51 kg/m²   Smoking Status Former   BSA 2.03 m²       General: not verbally responsive but reacts to some commands at times  Eyes: did not open eyes  Ears: External ears normal in appearance. No proptosis, swelling, or mastoid region tenderness observed b/l. L EAC complete impaction w/ cerumen. R EAC partially obstructed w/ cerumen, can visualize TM at superior quadrants, no signs of " effusion, blood, or AOM observed  Nose: External appearance normal, septum with no obvious deviation, no inferior turbinate hypertrophy, no mass/lesions, no rhinorrhea  Oral cavity: No trismus, no mass/lesions, tonsils absent, cannot visualize uvula well, no purulent dc visualized  Neck: Trachea is midline, no thyroid nodules, salivary glands symmetrical, no masses/abnormality on palpation  Lymph: No cervical lymphadenopathy  Skin: No obvious facial lesions  Neuro: Motor and sensory grossly intact, face symmetrical, no obvious cranial nerve palsies, motor and sensory grossly intact, no focal deficits.   Lungs: Normal work of breathing, symmetrical chest expansion  Vascular: Well perfused    Patient Active Problem List    Diagnosis Date Noted    Subarachnoid hemorrhage ([facility]) 01/05/2024    Anemia due to chronic kidney disease, on chronic dialysis ([facility]) 01/05/2024    Type 1 diabetes mellitus on insulin therapy ([facility]) 11/26/2023    Hyperlipidemia 11/26/2023    Anxiety 11/26/2023    Insomnia 11/26/2023    RACHEAL (obstructive sleep apnea)     Status post placement of implantable loop recorder 05/12/2022    Coronary artery disease involving native coronary artery of native heart without angina pectoris 04/22/2022    Pulmonary HTN ([facility]) 04/22/2022    Hypothyroidism 02/08/2022    Cerebellar stroke syndrome 01/06/2022    Anemia in chronic kidney disease 01/06/2022    Essential (primary) hypertension 01/06/2022    Numbness of arm 07/01/2021    Mild episode of recurrent major depressive disorder ([facility]) 07/01/2021    Generalized anxiety disorder 07/01/2021    Medical cannabis use 04/15/2021    Tubulovillous adenoma of colon 01/18/2021    GERD (gastroesophageal reflux disease) 01/18/2021    End stage kidney disease ([facility]) 12/03/2020    Secondary hyperparathyroidism ([facility]) 10/23/2020    Diabetic neuropathy ([facility]) 03/09/2020    Provoked seizure ([facility]) 03/09/2020    Asterixis 03/09/2020    Foot drop, bilateral 03/09/2020    Impaired mobility  and ADLs 02/29/2020    Vertigo 02/20/2020    Multiple pulmonary nodules 12/09/2019    Gastroparesis diabeticorum  09/17/2019    Pruritus 09/06/2019    Environmental and seasonal allergies 05/21/2019    Strabismus 09/24/2018    Dyslipidemia 08/24/2018    Heterozygous for 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) 06/04/2018    Renovascular hypertension 03/26/2018    Left atrial dilation 02/27/2018    Bilateral leg edema 02/19/2018    Persistent proteinuria 02/11/2018    Anemia in chronic kidney disease, on chronic dialysis (HCC) 02/10/2018    Hypertension 02/09/2018    Homozygous MTHFR mutation [id number] 02/05/2018    Hyperphosphatemia 01/29/2018    Vitamin D deficiency 01/29/2018    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

## 2024-01-08 NOTE — ASSESSMENT & PLAN NOTE
Lab Results   Component Value Date    EGFR 3 01/08/2024    EGFR 4 01/07/2024    EGFR 5 01/06/2024    CREATININE 14.28 (H) 01/08/2024    CREATININE 12.62 (H) 01/07/2024    CREATININE 10.27 (H) 01/06/2024     On HD (MWF)

## 2024-01-08 NOTE — CONSULTS
Podiatry - Consultation    Patient Information:   Mateus Mckeon 38 y.o. male MRN: [id number]  Unit/Bed#: ICU 10 Encounter: [id number]  PCP: Dania Sher DO  Date of Admission:  1/5/2024  Date of Consultation: 01/08/24  Requesting Physician: Cinthia Dempsey MD      ASSESSMENT:    Mateus Mckeon is a 38 y.o. male with:    Onychomycosis  PVD  Type 1 diabetes mellitus      PLAN:    Nails debrided in both thickness and length x 10 with a large nail nipper   Q8 findings given nonpalpable pulses and absent pedal hair  No foot deformities noted, no open wounds noted  Podiatry to sign off at this time, please reconsult for any further issues  Weightbearing status: Weightbearing as tolerated    SUBJECTIVE:    History of Present Illness:    Mateus Mckeon is a 38 y.o. male who is originally admitted 1/5/2024 due to subarachnoid hemorrhage. Patient has a past medical history of type 1 diabetes mellitus PVD, history of CVA, vitamin D deficiency, and ESRD.    We are consulted for onychomycosis.  Patient follows with Dr. Ford for routine nail care and has established q8 class findings.  Patient was supposed to have an appoint with podiatry today, however he has been admitted to the hospital.  Patient requesting nail care.  Patient reports not presenting the lower extremity.  Patient denies any other pedal complaints this time.    Review of Systems:    Constitutional: Negative.    HENT: Negative.    Eyes: Negative.    Respiratory: Negative.    Cardiovascular: Negative.    Gastrointestinal: Negative.    Musculoskeletal: Negative  Skin: Thickened elongated toenails  Neurological: Numbness to the bilateral lower extremities  Psych: Negative.     Past Medical and Surgical History:     Past Medical History:   Diagnosis Date    Acute kidney injury (HCC)     Ambulates with cane     Anuria     Anxiety     Cellulitis of right elbow 03/31/2021    Chronic kidney disease     Depression     Diabetes mellitus (HCC)      Diarrhea     Emesis 10/24/2020    End stage renal disease (HCC) 02/11/2018    Formatting of this note might be different from the original. Last Assessment & Plan:  Secondary to DM.  On nightly PD.  Followed by [facility].  Patient considering transplant for kidney and pancreas through [facility] Formatting of this note might be different from the original. Last Assessment & Plan:  Formatting of this note might be different from the original. 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    Esophagitis 07/21/2015    Falls     Gastroparesis     GERD (gastroesophageal reflux disease)     History of shingles 2010    History of transfusion 02/2018    no adverse reaction    Hyperlipidemia     Hyperphosphatemia     Hypertension     Hypoglycemia 07/15/2022    Itching     Mastoiditis of right side 07/15/2022    Muscle weakness     general unsteadiness    Obesity (BMI 30.0-34.9) 09/09/2019    Orthostatic hypotension 10/25/2020    Peripheral polyneuropathy 11/20/2019    PONV (postoperative nausea and vomiting) 01/26/2018    Protein-calorie malnutrition (HCC) 11/23/2020    Recurrent peritonitis (HCC) due to peritoneal dialysis catheter 07/31/2020    Retinopathy     Seizures (HCC)     early 2020 - one time    Skin abnormality     some dime size areas where skin was scratched from itching    Spontaneous bacterial peritonitis (HCC) 10/19/2020    Squamous cell skin cancer     left temple    Stroke (HCC)     x2 - off balance/no driving/fatigue    Swelling of both lower extremities     Traumatic onycholysis 07/21/2022    Vomiting     Wears glasses        Past Surgical History:   Procedure Laterality Date    CARDIAC ELECTROPHYSIOLOGY PROCEDURE N/A 9/21/2023    Procedure: Cardiac loop recorder explant;  Surgeon: Parish Morgan MD;  Location: [facility];  Service: Cardiology    CARDIAC LOOP RECORDER  05/2018    COLONOSCOPY      EGD      EYE SURGERY Right     IR AV FISTULAGRAM/GRAFTOGRAM  02/23/2021     IR TUNNELED CENTRAL LINE PLACEMENT  02/16/2021    IR TUNNELED DIALYSIS CATHETER PLACEMENT  11/18/2020    IR TUNNELED DIALYSIS CATHETER REMOVAL  02/12/2021    IR TUNNELED DIALYSIS CATHETER REMOVAL  03/11/2021    MOHS SURGERY Left 12/14/2022    Left temple with Dr. Hassan    PERITONEAL CATHETER INSERTION N/A 08/27/2018    Procedure: UNROOF PD CATHETER;  Surgeon: Felipe Lindo DO;  Location: [facility];  Service: General    WA ARTERIOVENOUS ANASTOMOSIS OPEN DIRECT Left 11/09/2020    Procedure: CREATION FISTULA  ARTERIOVENOUS (AV) - LEFT WRIST;  Surgeon: Placido Altamirano MD;  Location: [facility];  Service: Vascular    WA ESOPHAGOGASTRODUODENOSCOPY TRANSORAL DIAGNOSTIC N/A 04/18/2019    Procedure: ESOPHAGOGASTRODUODENOSCOPY (EGD);  Surgeon: Ale Figueroa MD;  Location: [facility];  Service: Gastroenterology    WA LAPS INSERTION TUNNELED INTRAPERITONEAL CATHETER N/A 08/06/2018    Procedure: LAPAROSCOPIC PD CATHETER PLACEMENT;  Surgeon: Felipe Lindo DO;  Location: [facility];  Service: General    WA REMOVAL TUNNELED INTRAPERITONEAL CATHETER N/A 11/18/2020    Procedure: REMOVAL CATHETER PERITONEAL DIALYSIS;  Surgeon: Abdifatah Ty MD;  Location: [facility];  Service: General    TONSILLECTOMY      UPPER GASTROINTESTINAL ENDOSCOPY         Meds/Allergies:    Medications Prior to Admission   Medication    aspirin (ECOTRIN LOW STRENGTH) 81 mg EC tablet    atorvastatin (LIPITOR) 40 mg tablet    b complex vitamins capsule    B-D ULTRAFINE III SHORT PEN 31G X 8 MM MISC    calcium acetate (PHOSLO) capsule    cinacalcet (SENSIPAR) 60 MG tablet    cloNIDine (CATAPRES-TTS-3) 0.3 mg/24 hr    escitalopram (LEXAPRO) 20 mg tablet    famotidine (PEPCID) 40 MG tablet    gabapentin (NEURONTIN) 100 mg capsule    GLUCAGON EMERGENCY 1 MG injection    Gvoke HypoPen 1-Pack 1 MG/0.2ML SOAJ    hydrALAZINE (APRESOLINE) 25 mg tablet    hydrOXYzine HCL (ATARAX) 10 mg tablet    Insulin Disposable Pump (Omnipod 5 G6 Intro, Gen 5,) KIT    Insulin  "Disposable Pump (Omnipod 5 G6 Pod, Gen 5,) MISC    labetalol (NORMODYNE) 200 mg tablet    lisinopril (ZESTRIL) 40 mg tablet    mupirocin (BACTROBAN) 2 % ointment    NIFEdipine (PROCARDIA XL) 90 mg 24 hr tablet    NovoLOG 100 UNIT/ML injection    ondansetron (ZOFRAN) 4 mg tablet    Patiromer Sorbitex Calcium (VELTASSA PO)    saccharomyces boulardii (FLORASTOR) 250 mg capsule    SPS 15 GM/60ML suspension    traZODone (DESYREL) 50 mg tablet    triamcinolone (KENALOG) 0.1 % ointment    Velphoro 500 MG CHEW       Allergies   Allergen Reactions    Sulfa Antibiotics Rash       Social History:     Marital Status: Single    Substance Use History:   Social History     Substance and Sexual Activity   Alcohol Use Not Currently     Social History     Tobacco Use   Smoking Status Former    Current packs/day: 0.00    Average packs/day: 0.5 packs/day for 12.0 years (6.0 ttl pk-yrs)    Types: Cigarettes    Start date: 2006    Quit date: 2018    Years since quittin.9   Smokeless Tobacco Never   Tobacco Comments    quit 2018     Social History     Substance and Sexual Activity   Drug Use Yes    Types: Marijuana    Comment: medical marijuana       Family History:    Family History   Problem Relation Age of Onset    Breast cancer Mother     Hypertension Mother     Hyperlipidemia Father     Hypertension Father     Leukemia Maternal Grandmother     Hyperlipidemia Maternal Grandfather     Hypertension Maternal Grandfather     Hyperlipidemia Paternal Grandmother     Hypertension Paternal Grandmother     Heart disease Paternal Grandfather         cardiac disorder    Diabetes Paternal Grandfather          OBJECTIVE:    Vitals:   Blood Pressure: 158/87 (24 1223)  Pulse: 76 (24 1223)  Temperature: 98.2 °F (36.8 °C) (24 0800)  Temp Source: Oral (24 0800)  Respirations: 22 (24 1223)  Height: 5' 5\" (165.1 cm) (24 0731)  Weight - Scale: 95.3 kg (210 lb) (24 0731)  SpO2: 97 % (24 " 1223)    Physical Exam:    General Appearance: Alert, cooperative, no distress.  HEENT: Head normocephalic, atraumatic, without obvious abnormality.  Heart: Normal rate and rhythm.  Lungs: Non-labored breathing. No respiratory distress.  Abdomen: Without distension.  Psychiatric: AAOx3  Lower Extremity:    Vascular:   Nonpalpable PT/DP bilaterally  Absent pedal hair  No claudication symptoms noted  Capillary refill within 3     Musculoskeletal:  MMT is 4/5 in all muscle compartments bilaterally.   ROM at the 1st MPJ and ankle joint are reduced bilaterally with the leg extended.   No Pain on palpation of bilateral feet.   No gross deformities noted.     Dermatological:  Thickened elongated yellowed toenails x 10  Global xerosis noted  No other rashes or lesions noted    Neurological:  Gross sensation is diminished.   Light touch is diminished.   Protective sensation is diminished.      Additional data:     Lab Results: I have personally reviewed pertinent labs including:    Results from last 7 days   Lab Units 01/08/24  0654   WBC Thousand/uL 13.04*   HEMOGLOBIN g/dL 10.3*   HEMATOCRIT % 30.1*   PLATELETS Thousands/uL 217   NEUTROS PCT % 80*   LYMPHS PCT % 9*   MONOS PCT % 6   EOS PCT % 3     Results from last 7 days   Lab Units 01/08/24  0654 01/05/24  0548 01/04/24  2243   POTASSIUM mmol/L 5.2   < > 5.3   CHLORIDE mmol/L 93*   < > 91*   CO2 mmol/L 19*   < > 27   BUN mg/dL 62*   < > 56*   CREATININE mg/dL 14.28*   < > 13.49*   CALCIUM mg/dL 8.3*   < > 9.0   ALK PHOS U/L  --   --  79   ALT U/L  --   --  10   AST U/L  --   --  15    < > = values in this interval not displayed.     Results from last 7 days   Lab Units 01/05/24  0351   INR  1.04       Cultures: I have personally reviewed pertinent cultures including:              Imaging: I have personally reviewed pertinent reports in PACS.  EKG, Pathology, and Other Studies: I have personally reviewed pertinent reports.    Time Spent for Care: 30 minutes.  More than  "50% of total time spent on counseling and coordination of care as described above.      ** Please Note: Portions of the record may have been created with voice recognition software. Occasional wrong word or \"sound a like\" substitutions may have occurred due to the inherent limitations of voice recognition software. Read the chart carefully and recognize, using context, where substitutions have occurred. **    "

## 2024-01-08 NOTE — PLAN OF CARE
Problem: PAIN - ADULT  Goal: Verbalizes/displays adequate comfort level or baseline comfort level  Description: Interventions:  - Encourage patient to monitor pain and request assistance  - Assess pain using appropriate pain scale  - Administer analgesics based on type and severity of pain and evaluate response  - Implement non-pharmacological measures as appropriate and evaluate response  - Consider cultural and social influences on pain and pain management  - Notify physician/advanced practitioner if interventions unsuccessful or patient reports new pain  Outcome: Progressing     Problem: INFECTION - ADULT  Goal: Absence or prevention of progression during hospitalization  Description: INTERVENTIONS:  - Assess and monitor for signs and symptoms of infection  - Monitor lab/diagnostic results  - Monitor all insertion sites, i.e. indwelling lines, tubes, and drains  - Monitor endotracheal if appropriate and nasal secretions for changes in amount and color  - [place] appropriate cooling/warming therapies per order  - Administer medications as ordered  - Instruct and encourage patient and family to use good hand hygiene technique  - Identify and instruct in appropriate isolation precautions for identified infection/condition  Outcome: Progressing  Goal: Absence of fever/infection during neutropenic period  Description: INTERVENTIONS:  - Monitor WBC    Outcome: Progressing     Problem: SAFETY ADULT  Goal: Patient will remain free of falls  Description: INTERVENTIONS:  - Educate patient/family on patient safety including physical limitations  - Instruct patient to call for assistance with activity   - Consult OT/PT to assist with strengthening/mobility   - Keep Call bell within reach  - Keep bed low and locked with side rails adjusted as appropriate  - Keep care items and personal belongings within reach  - Initiate and maintain comfort rounds  - Make Fall Risk Sign visible to staff  - Offer Toileting every  Hours,  in advance of need  - Initiate/Maintain alarm  - Obtain necessary fall risk management equipment:   - Apply yellow socks and bracelet for high fall risk patients  - Consider moving patient to room near nurses station  Outcome: Progressing  Goal: Maintain or return to baseline ADL function  Description: INTERVENTIONS:  -  Assess patient's ability to carry out ADLs; assess patient's baseline for ADL function and identify physical deficits which impact ability to perform ADLs (bathing, care of mouth/teeth, toileting, grooming, dressing, etc.)  - Assess/evaluate cause of self-care deficits   - Assess range of motion  - Assess patient's mobility; develop plan if impaired  - Assess patient's need for assistive devices and provide as appropriate  - Encourage maximum independence but intervene and supervise when necessary  - Involve family in performance of ADLs  - Assess for home care needs following discharge   - Consider OT consult to assist with ADL evaluation and planning for discharge  - Provide patient education as appropriate  Outcome: Progressing  Goal: Maintains/Returns to pre admission functional level  Description: INTERVENTIONS:  - Perform AM-PAC 6 Click Basic Mobility/ Daily Activity assessment daily.  - Set and communicate daily mobility goal to care team and patient/family/caregiver.   - Collaborate with rehabilitation services on mobility goals if consulted  - Perform Range of Motion  3 times a day.  - Reposition patient every 2 hours.  - Dangle patient  3 times a day  - Stand patient  3 times a day  - Ambulate patient 3 times a day  - Out of bed to chair 3  times a day   - Out of bed for meals 3 times a day  - Out of bed for toileting  - Record patient progress and toleration of activity level   Outcome: Progressing     Problem: DISCHARGE PLANNING  Goal: Discharge to home or other facility with appropriate resources  Description: INTERVENTIONS:  - Identify barriers to discharge w/patient and caregiver  -  Arrange for needed discharge resources and transportation as appropriate  - Identify discharge learning needs (meds, wound care, etc.)  - Arrange for interpretive services to assist at discharge as needed  - Refer to Case Management Department for coordinating discharge planning if the patient needs post-hospital services based on physician/advanced practitioner order or complex needs related to functional status, cognitive ability, or social support system  Outcome: Progressing     Problem: Knowledge Deficit  Goal: Patient/family/caregiver demonstrates understanding of disease process, treatment plan, medications, and discharge instructions  Description: Complete learning assessment and assess knowledge base.  Interventions:  - Provide teaching at level of understanding  - Provide teaching via preferred learning methods  Outcome: Progressing     Problem: Nutrition/Hydration-ADULT  Goal: Nutrient/Hydration intake appropriate for improving, restoring or maintaining nutritional needs  Description: Monitor and assess patient's nutrition/hydration status for malnutrition. Collaborate with interdisciplinary team and initiate plan and interventions as ordered.  Monitor patient's weight and dietary intake as ordered or per policy. Utilize nutrition screening tool and intervene as necessary. Determine patient's food preferences and provide high-protein, high-caloric foods as appropriate.     INTERVENTIONS:  - Monitor oral intake, urinary output, labs, and treatment plans  - Assess nutrition and hydration status and recommend course of action  - Evaluate amount of meals eaten  - Assist patient with eating if necessary   - Allow adequate time for meals  - Recommend/ encourage appropriate diets, oral nutritional supplements, and vitamin/mineral supplements  - Order, calculate, and assess calorie counts as needed  - Recommend, monitor, and adjust tube feedings and TPN/PPN based on assessed needs  - Assess need for intravenous  fluids  - Provide specific nutrition/hydration education as appropriate  - Include patient/family/caregiver in decisions related to nutrition  Outcome: Progressing     Problem: METABOLIC, FLUID AND ELECTROLYTES - ADULT  Goal: Electrolytes maintained within normal limits  Description: INTERVENTIONS:  - Monitor labs and assess patient for signs and symptoms of electrolyte imbalances  - Administer electrolyte replacement as ordered  - Monitor response to electrolyte replacements, including repeat lab results as appropriate  - Instruct patient on fluid and nutrition as appropriate  Outcome: Progressing  Goal: Fluid balance maintained  Description: INTERVENTIONS:  - Monitor labs   - Monitor I/O and WT  - Instruct patient on fluid and nutrition as appropriate  - Assess for signs & symptoms of volume excess or deficit  Outcome: Progressing     Problem: Prexisting or High Potential for Compromised Skin Integrity  Goal: Skin integrity is maintained or improved  Description: INTERVENTIONS:  - Identify patients at risk for skin breakdown  - Assess and monitor skin integrity  - Assess and monitor nutrition and hydration status  - Monitor labs   - Assess for incontinence   - Turn and reposition patient  - Assist with mobility/ambulation  - Relieve pressure over bony prominences  - Avoid friction and shearing  - Provide appropriate hygiene as needed including keeping skin clean and dry  - Evaluate need for skin moisturizer/barrier cream  - Collaborate with interdisciplinary team   - Patient/family teaching  - Consider wound care consult   Outcome: Progressing

## 2024-01-08 NOTE — PLAN OF CARE
TX plan reviewed with Dr Gaston before the start of HDTX and she is agreeable to the following: Goal is to UF 3.8 L to EDW on a 2 K+ bath for a morning serum K+ of 5.2, 3 HR TX. Pt to receive HDTX tomorrow.     Problem: METABOLIC, FLUID AND ELECTROLYTES - ADULT  Goal: Electrolytes maintained within normal limits  Description: INTERVENTIONS:  - Monitor labs and assess patient for signs and symptoms of electrolyte imbalances  - Administer electrolyte replacement as ordered  - Monitor response to electrolyte replacements, including repeat lab results as appropriate  - Instruct patient on fluid and nutrition as appropriate  Outcome: Progressing  Goal: Fluid balance maintained  Description: INTERVENTIONS:  - Monitor labs   - Monitor I/O and WT  - Instruct patient on fluid and nutrition as appropriate  - Assess for signs & symptoms of volume excess or deficit  Outcome: Progressing     Post-Dialysis RN Treatment Note    Blood Pressure:  Pre 181/94 mm/Hg  Post 195/97 mmHg   [place] [facility] [place]:  Pre 96.8 kg   Post 93 kg   Mode of weight measurement: Bed Scale   Volume Removed  3800 ml net    Treatment duration 180 minutes    NS given  No    Treatment shortened? No   Medications given during Rx None Reported   Estimated Kt/V  None Reported   Access type: AV fistula   Access Issues: No    Report called to primary nurse   Yes, Miguel White, RN

## 2024-01-08 NOTE — PROGRESS NOTES
"    Progress Note - Nephrology   Mateus Mckeon 38 y.o. male MRN: [id number]  Unit/Bed#: ICU 10 Encounter: [id number]      Assessment / Plan:  [place] [facility] [place] Friday-plan for hemodialysis today in the setting of MRI this morning to reduce risk of NSF although risk is less with class II gadolinium agents  -Because of end-stage renal disease due to diabetic nephropathy, previously on PD but changed HD December 2020  -Plan for 3 consecutive days of dialysis status post MRI today, avoiding aggressive UF and plan for even UF tomorrow.  Access-left upper extremity AV fistula well-functioning  Subarachnoid hemorrhage-management per ICU and neurosurgical teams  Hypertension-does have history of hypertensive emergency in the past, off Cardene drip, on clonidine 0.3 mg weekly patch, hydralazine 25 mg p.o. 3 times daily, labetalol 400 mg p.o. 3 times daily, lisinopril 40 mg p.o. daily, Nimotop 60 mg p.o. every 4hr, nifedipine 90 mg p.o. daily at bedtime as well as as needed hydralazine  Hyperphosphatemia-continue PhosLo 1 tab 3 times daily with meals, monitor Phos outpatient, on Velphoro as an outpatient, Phos 6.1 today  Secondary hyperparathyroidism in origin-continue Sensipar 60 mg daily, monitor PTH outpatient.  May hold Sensipar if nausea persists  Diabetes mellitus type 2 in the setting of ESRD-management per primary team  History of hyperkalemia-on Veltassa 5 mg on nondialysis days as an outpatient, potassium stable  Nonelevated anion gap metabolic acidosis-bicarbonate 19, correct with HD today, monitor BMP  Anemia of CKD-hemoglobin at goal, hemoglobin 10.3 today, monitor CBC        Subjective:   Denies chest pain or shortness of breath.  No complaints except for headache.  Family updated at bedside.  Status post MRI earlier this morning.      Objective:     Vitals: Blood pressure 164/88, pulse 78, temperature 98.2 °F (36.8 °C), temperature source Oral, resp. rate 20, height 5' 5\" (1.651 " m), weight 95.3 kg (210 lb), SpO2 95%.,Body mass index is 34.95 kg/m².Temp (24hrs), Av.6 °F (37 °C), Min:98.2 °F (36.8 °C), Max:98.9 °F (37.2 °C)      Weight (last 2 days)       None              Intake/Output Summary (Last 24 hours) at 2024 1405  Last data filed at 2024 1200  Gross per 24 hour   Intake 1030 ml   Output 0 ml   Net 1030 ml     I/O last 24 hours:  In: 1410 [P.O.:1360; I.V.:50]  Out: 0         Physical Exam:   Physical Exam  Vitals reviewed.   Constitutional:       General: He is not in acute distress.     Appearance: Normal appearance. He is well-developed. He is not diaphoretic.   HENT:      Head: Normocephalic and atraumatic.      Nose: Nose normal.      Mouth/Throat:      Mouth: Mucous membranes are moist.      Pharynx: No oropharyngeal exudate.   Eyes:      General: No scleral icterus.        Right eye: No discharge.         Left eye: No discharge.   Neck:      Thyroid: No thyromegaly.   Cardiovascular:      Rate and Rhythm: Normal rate and regular rhythm.      Heart sounds: Normal heart sounds.   Pulmonary:      Effort: Pulmonary effort is normal.      Breath sounds: Normal breath sounds. No wheezing or rales.   Abdominal:      General: Bowel sounds are normal. There is no distension.      Palpations: Abdomen is soft.      Tenderness: There is no abdominal tenderness.   Musculoskeletal:         General: No swelling. Normal range of motion.      Cervical back: Neck supple.   Lymphadenopathy:      Cervical: No cervical adenopathy.   Skin:     General: Skin is warm and dry.      Findings: No rash.   Neurological:      Mental Status: He is alert.      Comments: awake   Psychiatric:      Comments: Flat affect         Invasive Devices       Peripheral Intravenous Line  Duration             Peripheral IV 24 Right Antecubital 3 days    Peripheral IV 24 Dorsal (posterior);Right Forearm 3 days              Line  Duration             Peritoneal Dialysis Catheter [place]-neck/flanged  collar Left lower abdomen 1981 days    Hemodialysis Access 11/02/20 Left Forearm 1162 days    Hemodialysis AV Fistula 11/09/20 Left Other (Comment) 1155 days                    Medications:    Scheduled Meds:  Current Facility-Administered Medications   Medication Dose Route Frequency Provider Last Rate    acetaminophen  975 mg Oral Q6H [facility] Radha Steen MD      atorvastatin  40 mg Oral QPM Juan Woodruff MD      bisacodyl  10 mg Rectal Daily PRN DEISI Tony      calcium acetate  667 mg Oral TID With Meals Juan Woodruff MD      chlorhexidine  15 mL Mouth/Throat Q12H [facility] DEISI Tony      cinacalcet  60 mg Oral Daily Tyler Anand, DO      cloNIDine  0.3 mg Transdermal Weekly Andressa Marcos MD      escitalopram  20 mg Oral Daily Juan Woodruff MD      famotidine  40 mg Oral Daily Juan Woodruff MD      gabapentin  100 mg Oral TID Radha Steen MD      hydrALAZINE  10 mg Intravenous Q4H PRN DEISI Egan      hydrALAZINE  25 mg Oral TID Juan Woodruff MD      HYDROmorphone  0.2 mg Intravenous Q4H PRN Hansel Schultz DO      insulin lispro  300 Units Subcutaneous Insulin Pump Daily PRN Rosemary Rocha DO      labetalol  400 mg Oral TID Juan Woodruff MD      lisinopril  40 mg Oral Daily Tyler Anand, DO      metoclopramide  10 mg Intravenous Q6H PRN DEISI Egan      NIFEdipine  90 mg Oral HS Juan Woodruff MD      niMODipine  60 mg Oral Q4H [facility] Andressa Marcos MD      ondansetron  4 mg Intravenous Q4H PRN DEISI Egan      oxyCODONE  2.5 mg Oral Q4H PRN Hansel Schultz DO      Or    oxyCODONE  5 mg Oral Q4H PRN Hansel Schultz DO      patient maintained insulin pump  1 each Subcutaneous Q8H Rosemary Rocha DO      polyethylene glycol  17 g Oral Daily Radha Steen MD      prochlorperazine  5 mg Oral Q6H PRN Hansel Schultz DO      senna-docusate sodium  1 tablet Oral BID Cinthia Dempsey MD         PRN Meds:.  bisacodyl    hydrALAZINE     "HYDROmorphone    insulin lispro    metoclopramide    ondansetron    oxyCODONE **OR** oxyCODONE    prochlorperazine    Continuous Infusions:         LAB RESULTS:      Results from last 7 days   Lab Units 01/08/24  0654 01/07/24  0541 01/06/24  0527 01/05/24  0548 01/04/24  2243 01/03/24  0934   WBC Thousand/uL 13.04* 9.89 10.25*  --  8.46 7.16   HEMOGLOBIN g/dL 10.3* 9.1* 9.6*  --  11.5* 10.7*   HEMATOCRIT % 30.1* 27.8* 29.2*  --  34.3* 32.1*   PLATELETS Thousands/uL 217 195 208  --  210 215   NEUTROS PCT % 80* 71 82*  --  71 69   LYMPHS PCT % 9* 17 10*  --  17 17   MONOS PCT % 6 8 7  --  7 6   EOS PCT % 3 2 0  --  4 7*   POTASSIUM mmol/L 5.2 4.2 4.9 4.7 5.3 4.5   CHLORIDE mmol/L 93* 97 95*  --  91* 93*   CO2 mmol/L 19* 25 26  --  27 34*   BUN mg/dL 62* 45* 38*  --  56* 36*   CREATININE mg/dL 14.28* 12.62* 10.27*  --  13.49* 9.31*   CALCIUM mg/dL 8.3* 7.8* 9.1  --  9.0 8.4   ALK PHOS U/L  --   --   --   --  79 74   ALT U/L  --   --   --   --  10 10   AST U/L  --   --   --   --  15 15   MAGNESIUM mg/dL 2.3 2.1 2.2  --   --   --    PHOSPHORUS mg/dL 6.1* 6.0* 6.1*  --   --   --        CUTURES:  Lab Results   Component Value Date    BLOODCX No Growth After 5 Days. 11/26/2023    BLOODCX No Growth After 5 Days. 11/26/2023    BLOODCX Staphylococcus coagulase negative (A) 07/15/2022    BLOODCX No Growth After 5 Days. 07/15/2022    BLOODCX No Growth After 5 Days. 03/31/2021    BLOODCX No Growth After 5 Days. 03/31/2021    BLOODCX No Growth After 5 Days. 11/13/2020    URINECX No Growth <1000 cfu/mL 12/10/2019                 Portions of the record may have been created with voice recognition software. Occasional wrong word or \"sound a like\" substitutions may have occurred due to the inherent limitations of voice recognition software. Read the chart carefully and recognize, using context, where substitutions have occurred.If you have any questions, please contact the dictating provider.    "

## 2024-01-08 NOTE — PROGRESS NOTES
[facility]  Progress Note: Critical Care  Name: Mateus Mckeon 38 y.o. male I MRN: [id number]  Unit/Bed#: ICU 10 I Date of Admission: 1/5/2024   Date of Service: 1/8/2024 I Hospital Day: 3    Assessment/Plan   ASSESSMENT/PLAN:  Neuro/Psych/ENT: SAH / Headache due to SAH  Plan: Frequent neuro checks. CAM-ICU. Delirium precautions. Frequent re-orientation. Regulate sleep/wake cycle.   Analgesia: Multimodal. Tylenol scheduled. Dilaudid prn.   Lexapro  Gabapentin    CV: HTN/HLD  Plan: Continuous cardiopulmonary monitoring. Maintain MAP >65. Monitor resuscitative endpoints.   Labetalol  Hydralazine  Lisinopril  Nifedipine  Nimodipine  Atorvastatin    Pulm: No acute issues  Plan: Continuous pulse oximetry. Incentive spirometry when appropriate. Pulmonary toilet. Elevate HOB. Maintain O2 sat >90%.     GI: GERD / nausea  Plan: Bowel regimen: Dulcolax prn. Monitor stool output and quality.  GI prophylaxis: Famotidine  Zofran prn  Reglan prn  Compazine prn    /Renal: ESRD  Plan: Strict I/O. Monitor urine output and renal indices. Avoid nephrotoxins.   PhosLo  Cinacalcet  HD schedule per nephrology - will need three days in a row following MRI this AM    F/E/N: No acute issues  Plan:   F: none continuous. Fluid resuscitation prn.  E: Monitor and replete electrolytes for ionized calcium >1.12, Mg >2, Phos >3, K >4.  N: Renal diet    Endo: DM1 / secondary hyperparathyroidism  Insulin: home insulin pump  Steroids: not indicated  Plan: Monitor blood glucose for goal 140-180.      Heme: No acute issues  Plan: Monitor Hgb and transfuse for Hgb <7 or based on hemodynamics if actively bleeding. Monitor platelets.  VTE prophylaxis: hold for now. Sequential compression devices and/or foot pumps.    ID: No acute issues  Plan: Monitor fever curve and WBC. Maintain normothermia. Daily CHG bath, Peridex oral rinse, and nasal antiseptic while in ICU.    MSK/Skin: No acute issues  Plan: Frequent  turning and repositioning. Pressure injury prevention. Monitor for skin breakdown. PT/OT when appropriate. Encourage OOBTC and ambulation when appropriate. Local wound care prn.      Invasive medical instruments:  Invasive Devices       Peripheral Intravenous Line  Duration             Peripheral IV 01/04/24 Right Antecubital 3 days    Peripheral IV 01/05/24 Dorsal (posterior);Right Forearm 3 days              Line  Duration             Peritoneal Dialysis Catheter [place]-neck/flanged collar Left lower abdomen 1980 days    Hemodialysis Access 11/02/20 Left Forearm 1162 days    Hemodialysis AV Fistula 11/09/20 Left Other (Comment) 1154 days                     ICU LOS: 3d    CODE STATUS: Level 1    FAMILY UPDATE: I have updated and/or plan to update family today by telephone or in person if applicable.    HOME MEDICATIONS: I have reviewed home medications and reordered as clinically appropriate.  Medications Prior to Admission   Medication    aspirin (ECOTRIN LOW STRENGTH) 81 mg EC tablet    atorvastatin (LIPITOR) 40 mg tablet    b complex vitamins capsule    B-D ULTRAFINE III SHORT PEN 31G X 8 MM MISC    calcium acetate (PHOSLO) capsule    cinacalcet (SENSIPAR) 60 MG tablet    cloNIDine (CATAPRES-TTS-3) 0.3 mg/24 hr    escitalopram (LEXAPRO) 20 mg tablet    famotidine (PEPCID) 40 MG tablet    gabapentin (NEURONTIN) 100 mg capsule    GLUCAGON EMERGENCY 1 MG injection    Gvoke HypoPen 1-Pack 1 MG/0.2ML SOAJ    hydrALAZINE (APRESOLINE) 25 mg tablet    hydrOXYzine HCL (ATARAX) 10 mg tablet    Insulin Disposable Pump (Omnipod 5 G6 Intro, Gen 5,) KIT    Insulin Disposable Pump (Omnipod 5 G6 Pod, Gen 5,) MISC    labetalol (NORMODYNE) 200 mg tablet    lisinopril (ZESTRIL) 40 mg tablet    mupirocin (BACTROBAN) 2 % ointment    NIFEdipine (PROCARDIA XL) 90 mg 24 hr tablet    NovoLOG 100 UNIT/ML injection    ondansetron (ZOFRAN) 4 mg tablet    Patiromer Sorbitex Calcium (VELTASSA PO)    saccharomyces boulardii (FLORASTOR) 250 mg  capsule    SPS 15 GM/60ML suspension    traZODone (DESYREL) 50 mg tablet    triamcinolone (KENALOG) 0.1 % ointment    Velphoro 500 MG CHEW       IMAGING: I have reviewed pertinent films and reports.     LABS: I have reviewed pertinent lab results.    MICRO: I have reviewed microbiology data and adjusted antibiotic regimen as clinically appropriate.    MULTIDISCIPLINARY CARE: I have performed bedside rounds with nursing colleagues and will discuss the plan above with attending and full CC team.    Bryan Concepcion PA-C  01/08/24    Disposition: Critical care    ICU Core Measures     A: Assess, Prevent, and Manage Pain Has pain been assessed? Yes  Need for changes to pain regimen? No   B: Both SAT/SAT  N/A   C: Choice of Sedation RASS Goal: N/A patient not on sedation  Need for changes to sedation or analgesia regimen? No   D: Delirium CAM-ICU: Negative   E: Early Mobility  Plan for early mobility? Yes   F: Family Engagement Plan for family engagement today? Yes         Prophylaxis:  VTE Contraindicated secondary to: ICH   Stress Ulcer  covered byfamotidine (PEPCID) 40 MG tablet [[id number]] (Long-Term Med), famotidine (PEPCID) tablet 40 mg [[id number]]        Significant 24hr Events     24hr events: NAEO.      Subjective     Review of Systems   All other systems reviewed and are negative.       Objective                            Vitals I/O      Most Recent Min/Max in 24hrs   Temp 98.9 °F (37.2 °C) Temp  Min: 98.5 °F (36.9 °C)  Max: 99.1 °F (37.3 °C)   Pulse 80 Pulse  Min: 66  Max: 80   Resp 16 Resp  Min: 12  Max: 25   /78 BP  Min: 139/78  Max: 182/89   O2 Sat 97 % SpO2  Min: 97 %  Max: 100 %      Intake/Output Summary (Last 24 hours) at 1/8/2024 0554  Last data filed at 1/8/2024 0400  Gross per 24 hour   Intake 1220 ml   Output 0 ml   Net 1220 ml       Diet Renal; Renal [place]; No; No    Invasive Monitoring           Physical Exam   Physical Exam  Vitals reviewed.   Eyes:      Pupils: Pupils are equal,  round, and reactive to light.   Skin:     General: Skin is warm and dry.      Capillary Refill: Capillary refill takes less than 2 seconds.   HENT:      Head: Normocephalic.   Neck:      Vascular: No JVD.   Cardiovascular:      Rate and Rhythm: Normal rate and regular rhythm.      Pulses: Normal pulses.      Heart sounds: Normal heart sounds.   Musculoskeletal:      Right lower leg: No edema.      Left lower leg: No edema.   Abdominal: General: There is no distension.      Palpations: Abdomen is soft.      Tenderness: There is no abdominal tenderness.   Constitutional:       General: He is not in acute distress.     Appearance: He is not ill-appearing or toxic-appearing.   Pulmonary:      Effort: Pulmonary effort is normal. No tachypnea, accessory muscle usage, respiratory distress or accessory muscle usage.      Breath sounds: Normal breath sounds and air entry.   Neurological:      General: No focal deficit present.      Mental Status: He is alert and oriented to person, place, and time.            Diagnostic Studies      EKG: reviewed  Imaging:  I have personally reviewed pertinent reports.   and I have personally reviewed pertinent films in PACS     Medications:  Scheduled PRN   acetaminophen, 975 mg, Q6H HALIE  atorvastatin, 40 mg, QPM  calcium acetate, 667 mg, TID With Meals  chlorhexidine, 15 mL, Q12H HALIE  cinacalcet, 60 mg, Daily  cloNIDine, 0.3 mg, Weekly  escitalopram, 20 mg, Daily  famotidine, 40 mg, Daily  gabapentin, 100 mg, TID  hydrALAZINE, 25 mg, TID  labetalol, 400 mg, TID  lisinopril, 40 mg, Daily  NIFEdipine, 90 mg, HS  niMODipine, 60 mg, Q4H HALIE  patient maintained insulin pump, 1 each, Q8H  polyethylene glycol, 17 g, Daily  senna-docusate sodium, 1 tablet, HS      bisacodyl, 10 mg, Daily PRN  hydrALAZINE, 10 mg, Q4H PRN  HYDROmorphone, 0.2 mg, Q4H PRN  insulin lispro, 300 Units, Daily PRN  metoclopramide, 10 mg, Q6H PRN  ondansetron, 4 mg, Q4H PRN  oxyCODONE, 2.5 mg, Q4H PRN   Or  oxyCODONE, 5 mg,  Q4H PRN  prochlorperazine, 5 mg, Q6H PRN       Continuous          Labs:    CBC    Recent Labs     01/07/24  0541   WBC 9.89   HGB 9.1*   HCT 27.8*        BMP    Recent Labs     01/07/24  0541   SODIUM 138   K 4.2   CL 97   CO2 25   AGAP 16   BUN 45*   CREATININE 12.62*   CALCIUM 7.8*       Coags    No recent results     Additional Electrolytes  Recent Labs     01/07/24  0541   MG 2.1   PHOS 6.0*          Blood Gas    No recent results  No recent results LFTs  No recent results    Infectious  No recent results  Glucose  Recent Labs     01/07/24  0541   GLUC 91               Non-Critical Care Time Statement: I have spent a total time of 20 minutes in caring for this patient including Counseling / Coordination of care, Documenting in the medical record, Reviewing / ordering tests, medicine, procedures  , Obtaining or reviewing history  , and Communicating with other healthcare professionals .     Bryan Concepcion PA-C

## 2024-01-08 NOTE — PLAN OF CARE
Problem: Nutrition/Hydration-ADULT  Goal: Nutrient/Hydration intake appropriate for improving, restoring or maintaining nutritional needs  Description: Monitor and assess patient's nutrition/hydration status for malnutrition. Collaborate with interdisciplinary team and initiate plan and interventions as ordered.  Monitor patient's weight and dietary intake as ordered or per policy. Utilize nutrition screening tool and intervene as necessary. Determine patient's food preferences and provide high-protein, high-caloric foods as appropriate.     INTERVENTIONS:  - Monitor oral intake, urinary output, labs, and treatment plans  - Assess nutrition and hydration status and recommend course of action  - Evaluate amount of meals eaten  - Assist patient with eating if necessary   - Allow adequate time for meals  - Recommend/ encourage appropriate diets, oral nutritional supplements, and vitamin/mineral supplements  - Order, calculate, and assess calorie counts as needed  - Recommend, monitor, and adjust tube feedings and TPN/PPN based on assessed needs  - Assess need for intravenous fluids  - Provide specific nutrition/hydration education as appropriate  - Include patient/family/caregiver in decisions related to nutrition  Outcome: Progressing     Problem: METABOLIC, FLUID AND ELECTROLYTES - ADULT  Goal: Electrolytes maintained within normal limits  Description: INTERVENTIONS:  - Monitor labs and assess patient for signs and symptoms of electrolyte imbalances  - Administer electrolyte replacement as ordered  - Monitor response to electrolyte replacements, including repeat lab results as appropriate  - Instruct patient on fluid and nutrition as appropriate  Outcome: Progressing

## 2024-01-08 NOTE — OCCUPATIONAL THERAPY NOTE
Occupational Therapy         Patient Name: Mateus Mckeon  Today's Date: 1/8/2024 01/08/24 1100   OT Last Visit   OT Visit Date 01/08/24   Note Type   Note type Cancelled Session   Cancel Reasons Other   Additional Comments OT eval deferred 2* nausea/vomiting     Nichole Mendoza

## 2024-01-08 NOTE — PROGRESS NOTES
[facility]  Progress Note  Name: Mateus Mckeon I  MRN: [id number]  Unit/Bed#: ICU 10 I Date of Admission: 1/5/2024   Date of Service: 1/8/2024 I Hospital Day: 3    Assessment/Plan   * Subarachnoid hemorrhage (HCC)  Assessment & Plan  PPD 3 angio negative   Right basilar cistern SAH  BD 9, HH 2, MF 3.  P/w HA since last Sunday.  Hx ASA use for hx multiple strokes, reversed with DDAVP.  MRIs negative for source of SAH  On current exam, c/o headache and N, otherwise non-focal. GCS 15.     Imaging:  MRI brain wo 1/8/24:Stable hematoma in the right CP angle, prepontine and premedullary cisterns. New subarachnoid hemorrhage over the convexities and within the lateral ventricles likely due to redistribution.Few tiny scattered recent infarcts in multiple vascular distributions.  MRI cervical spine wo 1/8/24:No identifiable etiology for subarachnoid hemorrhage.Mild congenital canal stenosis.New marrow edema at C5-C6 with severe disc space narrowing that is similar to recent CT. Modic type I endplate degenerative changes favored over infection but recommend clinical correlation and follow-up imaging if warranted.    Plan:  Continue to monitor neuro exam closely.  STAT CTA with decline in GCS > 2 pts in 1 hour.  Reviewed imaging with patient and family this morning   Maintain SBP < 160 mmHg.  Will maintain on SAH pathway until proven otherwise:  Spasm watch  Daily TCDs: R 1.12, L 1.56  Continue Nimodipine 60mg Q4H  Normonatremia (today 135), euvolemia (+1,220/24H), Hgb >8 (today 10.3)   SBP<160 mmHg  Mobilize as tolerated.  DVT ppx: SCD's, hold pharmacologic DVT ppx.  Plan to likely repeat imaging/angiogram later this week pending patient progress     Neurosurgery will continue to follow. Call with questions.      End stage kidney disease (HCC)  Assessment & Plan  Lab Results   Component Value Date    EGFR 3 01/08/2024    EGFR 4 01/07/2024    EGFR 5 01/06/2024    CREATININE 14.28  "(H) 01/08/2024    CREATININE 12.62 (H) 01/07/2024    CREATININE 10.27 (H) 01/06/2024     On HD (MWF)             Subjective/Objective     Chief Complaint: \"I have a bad HA\"    Subjective: Patient reports a 7/10 whole head HA and some nausea and vomiting.  He states the pain medication does slightly help.  He also endorses ongoing neuropathy in his fingertips and feet.  He denies any dizziness, blurry vision, chest pain, shortness of breath, abdominal pain, diarrhea, no new problems with bowel or bladder, no new weakness or numbness/tingling.    Objective: Patient comfortably laying in bed with ice pack on head, NAD.    I/O         01/06 0701 01/07 0700 01/07 0701 01/08 0700 01/08 0701 01/09 0700    P.O. 450 1170     I.V. (mL/kg) 274.6 (2.9) 50 (0.5)     Total Intake(mL/kg) 724.6 (7.6) 1220 (12.8)     Urine (mL/kg/hr)  0 (0)     Other       Total Output  0     Net +724.6 +1220                    Invasive Devices       Peripheral Intravenous Line  Duration             Peripheral IV 01/04/24 Right Antecubital 3 days    Peripheral IV 01/05/24 Dorsal (posterior);Right Forearm 3 days              Line  Duration             Peritoneal Dialysis Catheter [place]-neck/flanged collar Left lower abdomen 1980 days    Hemodialysis Access 11/02/20 Left Forearm 1162 days    Hemodialysis AV Fistula 11/09/20 Left Other (Comment) 1154 days                    Physical Exam:  Vitals: Blood pressure 162/80, pulse 82, temperature 98.2 °F (36.8 °C), temperature source Oral, resp. rate 20, height 5' 5\" (1.651 m), weight 95.3 kg (210 lb), SpO2 98%.,Body mass index is 34.95 kg/m².    General appearance: alert, appears stated age, cooperative and no distress  Head: Normocephalic, without obvious abnormality, atraumatic  Eyes: EOMI, PERRL, conjugate gaze  Neck: supple, symmetrical, trachea midline   Lungs: non labored breathing  Heart: regular heart rate  Neurologic:   Mental status: Alert, oriented x3, thought content appropriate, speech is " "clear, following commands  Cranial nerves: grossly intact (Cranial nerves II-XII)  Sensory: normal to light touch in all extremities x 4  Motor: moving all extremities without focal weakness   Reflexes: 2+ and symmetric, no Annelise's or clonus appreciated  Coordination: finger to nose normal bilaterally, no drift bilaterally      Lab Results:  Results from last 7 days   Lab Units 01/08/24  0654 01/07/24  0541 01/06/24  0527   WBC Thousand/uL 13.04* 9.89 10.25*   HEMOGLOBIN g/dL 10.3* 9.1* 9.6*   HEMATOCRIT % 30.1* 27.8* 29.2*   PLATELETS Thousands/uL 217 195 208   NEUTROS PCT % 80* 71 82*   MONOS PCT % 6 8 7   EOS PCT % 3 2 0     Results from last 7 days   Lab Units 01/08/24  0654 01/07/24  0541 01/06/24  0527 01/05/24  0548 01/04/24  2243 01/03/24  0934   SODIUM mmol/L 135 138 136  --  135 136   POTASSIUM mmol/L 5.2 4.2 4.9   < > 5.3 4.5   CHLORIDE mmol/L 93* 97 95*  --  91* 93*   CO2 mmol/L 19* 25 26  --  27 34*   BUN mg/dL 62* 45* 38*  --  56* 36*   CREATININE mg/dL 14.28* 12.62* 10.27*  --  13.49* 9.31*   CALCIUM mg/dL 8.3* 7.8* 9.1  --  9.0 8.4   ALK PHOS U/L  --   --   --   --  79 74   ALT U/L  --   --   --   --  10 10   AST U/L  --   --   --   --  15 15    < > = values in this interval not displayed.     Results from last 7 days   Lab Units 01/08/24  0654 01/07/24  0541 01/06/24  0527   MAGNESIUM mg/dL 2.3 2.1 2.2     Results from last 7 days   Lab Units 01/08/24  0654 01/07/24  0541 01/06/24  0527   PHOSPHORUS mg/dL 6.1* 6.0* 6.1*     Results from last 7 days   Lab Units 01/05/24  0614 01/05/24  0351   INR   --  1.04   PTT seconds 29 30     No results found for: \"TROPONINT\"  ABG:  Lab Results   Component Value Date    PHART 7.454 (H) 02/21/2020    [id number] 33.2 (L) 02/21/2020    PO2ART 170.0 (H) 02/21/2020    RBD6PCX 22.8 02/21/2020    BEART -0.8 02/21/2020    SOURCE Radial, Right 02/21/2020       Imaging Studies: I have personally reviewed pertinent reports.   and I have personally reviewed pertinent films " in PACS    MRI brain w wo contrast    Result Date: 1/8/2024  Impression: Stable hematoma in the right CP angle, prepontine and premedullary cisterns. New subarachnoid hemorrhage over the convexities and within the lateral ventricles likely due to redistribution. Few tiny scattered recent infarcts in multiple vascular distributions. Study marked in epic for notification. Workstation performed: [id number]     MRI cervical spine w wo contrast    Result Date: 1/8/2024  Impression: No identifiable etiology for subarachnoid hemorrhage. Mild congenital canal stenosis. New marrow edema at C5-C6 with severe disc space narrowing that is similar to recent CT. Modic type I endplate degenerative changes favored over infection but recommend clinical correlation and follow-up imaging if warranted. Study marked in [facility] for notification. Workstation performed: [id number]     CTA head and neck with and without contrast    Result Date: 1/5/2024  Impression: 1.  Small amount of subarachnoid hemorrhage in the right basilar cistern region is again seen without significant change since the prior CT brain exam of the same day. No enhancing brain mass/fluid collection or evidence of vascular malformation. Major intracranial dural venous sinuses are grossly patent. 2.  Mild scattered calcific atherosclerosis of the carotid bifurcation and cavernous carotid segments bilaterally without significant stenosis. Bilateral major cervical/intracranial arteries are patent with normal course and caliber. No arterial occlusion, significant flow-limiting stenosis, or focal saccular aneurysm identified 3.  Nonspecific 4 mm right upper lobe lung nodule is seen. No routine follow-up imaging recommended per current Fleischner Society guidelines for low risk patient. 4.  Opacification of the right mastoid air cells could be secondary to mastoid effusion or mastoiditis, recommend clinical correlation with ENT exam findings. Left mastoid air cells and paranasal  sinuses appear well aerated and clear. Workstation performed: [id number]     CT head without contrast    Result Date: 1/5/2024  Impression: 1.  Right prepontine/premedullary cistern subarachnoid hemorrhage. Minimal mass effect on the right middle cerebellar peduncle. Short-term follow-up is recommended. 2.  Opacification of the right mastoid air cells correlate clinically for mastoiditis. I personally discussed this study with REYNALDO MULLER on 1/5/2024 3:43 AM. Workstation performed: [id number]       EKG, Pathology, and Other Studies: I have personally reviewed pertinent reports.      VTE Mechanical Prophylaxis: sequential compression device

## 2024-01-09 ENCOUNTER — APPOINTMENT (INPATIENT)
Dept: DIALYSIS | Facility: HOSPITAL | Age: 39
DRG: 064 | End: 2024-01-09
Attending: INTERNAL MEDICINE
Payer: MEDICARE

## 2024-01-09 ENCOUNTER — APPOINTMENT (INPATIENT)
Dept: NON INVASIVE DIAGNOSTICS | Facility: HOSPITAL | Age: 39
DRG: 064 | End: 2024-01-09
Payer: MEDICARE

## 2024-01-09 ENCOUNTER — APPOINTMENT (INPATIENT)
Dept: RADIOLOGY | Facility: HOSPITAL | Age: 39
DRG: 064 | End: 2024-01-09
Payer: MEDICARE

## 2024-01-09 PROBLEM — R90.89 ABNORMAL FINDING ON MRI OF BRAIN: Status: ACTIVE | Noted: 2024-01-09

## 2024-01-09 LAB
ANION GAP SERPL CALCULATED.3IONS-SCNC: 15 MMOL/L
BASOPHILS # BLD AUTO: 0.06 THOUSANDS/ÂΜL (ref 0–0.1)
BASOPHILS NFR BLD AUTO: 1 % (ref 0–1)
BUN SERPL-MCNC: 41 MG/DL (ref 5–25)
CALCIUM SERPL-MCNC: 8.5 MG/DL (ref 8.4–10.2)
CHLORIDE SERPL-SCNC: 94 MMOL/L (ref 96–108)
CO2 SERPL-SCNC: 25 MMOL/L (ref 21–32)
CREAT SERPL-MCNC: 9.84 MG/DL (ref 0.6–1.3)
EOSINOPHIL # BLD AUTO: 0.02 THOUSAND/ÂΜL (ref 0–0.61)
EOSINOPHIL NFR BLD AUTO: 0 % (ref 0–6)
ERYTHROCYTE [DISTWIDTH] IN BLOOD BY AUTOMATED COUNT: 13.9 % (ref 11.6–15.1)
GFR SERPL CREATININE-BSD FRML MDRD: 5 ML/MIN/1.73SQ M
GLUCOSE SERPL-MCNC: 164 MG/DL (ref 65–140)
GLUCOSE SERPL-MCNC: 172 MG/DL (ref 65–140)
GLUCOSE SERPL-MCNC: 176 MG/DL (ref 65–140)
GLUCOSE SERPL-MCNC: 180 MG/DL (ref 65–140)
GLUCOSE SERPL-MCNC: 189 MG/DL (ref 65–140)
HCT VFR BLD AUTO: 27.9 % (ref 36.5–49.3)
HGB BLD-MCNC: 9.4 G/DL (ref 12–17)
IMM GRANULOCYTES # BLD AUTO: 0.07 THOUSAND/UL (ref 0–0.2)
IMM GRANULOCYTES NFR BLD AUTO: 1 % (ref 0–2)
LYMPHOCYTES # BLD AUTO: 0.97 THOUSANDS/ÂΜL (ref 0.6–4.47)
LYMPHOCYTES NFR BLD AUTO: 9 % (ref 14–44)
MAGNESIUM SERPL-MCNC: 2.1 MG/DL (ref 1.9–2.7)
MCH RBC QN AUTO: 31.1 PG (ref 26.8–34.3)
MCHC RBC AUTO-ENTMCNC: 33.7 G/DL (ref 31.4–37.4)
MCV RBC AUTO: 92 FL (ref 82–98)
MONOCYTES # BLD AUTO: 0.67 THOUSAND/ÂΜL (ref 0.17–1.22)
MONOCYTES NFR BLD AUTO: 6 % (ref 4–12)
MRSA NOSE QL CULT: NORMAL
NEUTROPHILS # BLD AUTO: 8.69 THOUSANDS/ÂΜL (ref 1.85–7.62)
NEUTS SEG NFR BLD AUTO: 83 % (ref 43–75)
NRBC BLD AUTO-RTO: 0 /100 WBCS
PHOSPHATE SERPL-MCNC: 6.2 MG/DL (ref 2.7–4.5)
PLATELET # BLD AUTO: 220 THOUSANDS/UL (ref 149–390)
PMV BLD AUTO: 10.9 FL (ref 8.9–12.7)
POTASSIUM SERPL-SCNC: 4.3 MMOL/L (ref 3.5–5.3)
RBC # BLD AUTO: 3.02 MILLION/UL (ref 3.88–5.62)
SODIUM SERPL-SCNC: 134 MMOL/L (ref 135–147)
WBC # BLD AUTO: 10.48 THOUSAND/UL (ref 4.31–10.16)

## 2024-01-09 PROCEDURE — 83735 ASSAY OF MAGNESIUM: CPT

## 2024-01-09 PROCEDURE — 97116 GAIT TRAINING THERAPY: CPT

## 2024-01-09 PROCEDURE — 93886 INTRACRANIAL COMPLETE STUDY: CPT

## 2024-01-09 PROCEDURE — 84100 ASSAY OF PHOSPHORUS: CPT

## 2024-01-09 PROCEDURE — 82948 REAGENT STRIP/BLOOD GLUCOSE: CPT

## 2024-01-09 PROCEDURE — 80048 BASIC METABOLIC PNL TOTAL CA: CPT

## 2024-01-09 PROCEDURE — G1004 CDSM NDSC: HCPCS

## 2024-01-09 PROCEDURE — 99232 SBSQ HOSP IP/OBS MODERATE 35: CPT | Performed by: PHYSICIAN ASSISTANT

## 2024-01-09 PROCEDURE — 93886 INTRACRANIAL COMPLETE STUDY: CPT | Performed by: SURGERY

## 2024-01-09 PROCEDURE — 90935 HEMODIALYSIS ONE EVALUATION: CPT | Performed by: INTERNAL MEDICINE

## 2024-01-09 PROCEDURE — 85025 COMPLETE CBC W/AUTO DIFF WBC: CPT

## 2024-01-09 PROCEDURE — 70450 CT HEAD/BRAIN W/O DYE: CPT

## 2024-01-09 PROCEDURE — 99291 CRITICAL CARE FIRST HOUR: CPT | Performed by: PSYCHIATRY & NEUROLOGY

## 2024-01-09 PROCEDURE — 97167 OT EVAL HIGH COMPLEX 60 MIN: CPT

## 2024-01-09 RX ORDER — HYDROMORPHONE HCL IN WATER/PF 6 MG/30 ML
0.2 PATIENT CONTROLLED ANALGESIA SYRINGE INTRAVENOUS EVERY 4 HOURS PRN
Status: DISCONTINUED | OUTPATIENT
Start: 2024-01-09 | End: 2024-01-11

## 2024-01-09 RX ORDER — HYDRALAZINE HYDROCHLORIDE 50 MG/1
50 TABLET, FILM COATED ORAL 3 TIMES DAILY
Status: DISCONTINUED | OUTPATIENT
Start: 2024-01-09 | End: 2024-01-11

## 2024-01-09 RX ORDER — HYDRALAZINE HYDROCHLORIDE 20 MG/ML
10 INJECTION INTRAMUSCULAR; INTRAVENOUS EVERY 4 HOURS PRN
Status: DISCONTINUED | OUTPATIENT
Start: 2024-01-09 | End: 2024-01-13

## 2024-01-09 RX ORDER — HEPARIN SODIUM 5000 [USP'U]/ML
5000 INJECTION, SOLUTION INTRAVENOUS; SUBCUTANEOUS EVERY 8 HOURS SCHEDULED
Status: CANCELLED | OUTPATIENT
Start: 2024-01-09

## 2024-01-09 RX ORDER — HEPARIN SODIUM 5000 [USP'U]/ML
5000 INJECTION, SOLUTION INTRAVENOUS; SUBCUTANEOUS EVERY 8 HOURS SCHEDULED
Status: COMPLETED | OUTPATIENT
Start: 2024-01-09 | End: 2024-01-11

## 2024-01-09 RX ORDER — GABAPENTIN 300 MG/1
300 CAPSULE ORAL DAILY
Status: DISCONTINUED | OUTPATIENT
Start: 2024-01-09 | End: 2024-01-23 | Stop reason: HOSPADM

## 2024-01-09 RX ORDER — DEXAMETHASONE SODIUM PHOSPHATE 10 MG/ML
10 INJECTION, SOLUTION INTRAMUSCULAR; INTRAVENOUS ONCE
Status: COMPLETED | OUTPATIENT
Start: 2024-01-09 | End: 2024-01-09

## 2024-01-09 RX ADMIN — LABETALOL HYDROCHLORIDE 400 MG: 200 TABLET, FILM COATED ORAL at 20:00

## 2024-01-09 RX ADMIN — LISINOPRIL 40 MG: 20 TABLET ORAL at 17:50

## 2024-01-09 RX ADMIN — HYDROMORPHONE HYDROCHLORIDE 0.2 MG: 0.2 INJECTION, SOLUTION INTRAMUSCULAR; INTRAVENOUS; SUBCUTANEOUS at 10:04

## 2024-01-09 RX ADMIN — NIMODIPINE 60 MG: 30 CAPSULE, LIQUID FILLED ORAL at 15:58

## 2024-01-09 RX ADMIN — ESCITALOPRAM OXALATE 20 MG: 20 TABLET ORAL at 08:16

## 2024-01-09 RX ADMIN — SENNOSIDES, DOCUSATE SODIUM 1 TABLET: 8.6; 5 TABLET ORAL at 08:15

## 2024-01-09 RX ADMIN — ACETAMINOPHEN 975 MG: 325 TABLET, FILM COATED ORAL at 06:00

## 2024-01-09 RX ADMIN — Medication 2.5 MG: at 20:05

## 2024-01-09 RX ADMIN — CINACALCET 60 MG: 30 TABLET ORAL at 08:15

## 2024-01-09 RX ADMIN — FAMOTIDINE 40 MG: 20 TABLET, FILM COATED ORAL at 08:15

## 2024-01-09 RX ADMIN — NICARDIPINE HYDROCHLORIDE 2.5 MG/HR: 2.5 INJECTION, SOLUTION INTRAVENOUS at 04:49

## 2024-01-09 RX ADMIN — LABETALOL HYDROCHLORIDE 400 MG: 200 TABLET, FILM COATED ORAL at 08:15

## 2024-01-09 RX ADMIN — NIMODIPINE 60 MG: 30 CAPSULE, LIQUID FILLED ORAL at 04:23

## 2024-01-09 RX ADMIN — CHLORHEXIDINE GLUCONATE 15 ML: 1.2 SOLUTION ORAL at 08:15

## 2024-01-09 RX ADMIN — NIFEDIPINE 90 MG: 30 TABLET, FILM COATED, EXTENDED RELEASE ORAL at 22:13

## 2024-01-09 RX ADMIN — GABAPENTIN 100 MG: 100 CAPSULE ORAL at 08:15

## 2024-01-09 RX ADMIN — OXYCODONE HYDROCHLORIDE 5 MG: 5 TABLET ORAL at 15:58

## 2024-01-09 RX ADMIN — ATORVASTATIN CALCIUM 40 MG: 40 TABLET, FILM COATED ORAL at 17:49

## 2024-01-09 RX ADMIN — CALCIUM ACETATE 667 MG: 667 CAPSULE ORAL at 08:15

## 2024-01-09 RX ADMIN — GABAPENTIN 300 MG: 300 CAPSULE ORAL at 13:27

## 2024-01-09 RX ADMIN — HEPARIN SODIUM 5000 UNITS: 5000 INJECTION INTRAVENOUS; SUBCUTANEOUS at 17:50

## 2024-01-09 RX ADMIN — CALCIUM ACETATE 667 MG: 667 CAPSULE ORAL at 17:52

## 2024-01-09 RX ADMIN — CHLORHEXIDINE GLUCONATE 15 ML: 1.2 SOLUTION ORAL at 20:00

## 2024-01-09 RX ADMIN — HYDRALAZINE HYDROCHLORIDE 50 MG: 50 TABLET, FILM COATED ORAL at 08:16

## 2024-01-09 RX ADMIN — HYDRALAZINE HYDROCHLORIDE 50 MG: 50 TABLET, FILM COATED ORAL at 15:58

## 2024-01-09 RX ADMIN — HYDROMORPHONE HYDROCHLORIDE 0.2 MG: 0.2 INJECTION, SOLUTION INTRAMUSCULAR; INTRAVENOUS; SUBCUTANEOUS at 18:05

## 2024-01-09 RX ADMIN — NIMODIPINE 60 MG: 30 CAPSULE, LIQUID FILLED ORAL at 08:15

## 2024-01-09 RX ADMIN — LABETALOL HYDROCHLORIDE 400 MG: 200 TABLET, FILM COATED ORAL at 15:58

## 2024-01-09 RX ADMIN — Medication 2.5 MG: at 11:32

## 2024-01-09 RX ADMIN — NIMODIPINE 60 MG: 30 CAPSULE, LIQUID FILLED ORAL at 20:00

## 2024-01-09 RX ADMIN — METOCLOPRAMIDE HYDROCHLORIDE 10 MG: 5 INJECTION INTRAMUSCULAR; INTRAVENOUS at 11:37

## 2024-01-09 RX ADMIN — HYDRALAZINE HYDROCHLORIDE 50 MG: 50 TABLET, FILM COATED ORAL at 20:00

## 2024-01-09 RX ADMIN — ACETAMINOPHEN 975 MG: 325 TABLET, FILM COATED ORAL at 11:32

## 2024-01-09 RX ADMIN — NIMODIPINE 60 MG: 30 CAPSULE, LIQUID FILLED ORAL at 11:32

## 2024-01-09 RX ADMIN — ACETAMINOPHEN 975 MG: 325 TABLET, FILM COATED ORAL at 17:49

## 2024-01-09 RX ADMIN — HEPARIN SODIUM 5000 UNITS: 5000 INJECTION INTRAVENOUS; SUBCUTANEOUS at 22:13

## 2024-01-09 RX ADMIN — CALCIUM ACETATE 667 MG: 667 CAPSULE ORAL at 11:32

## 2024-01-09 RX ADMIN — OXYCODONE HYDROCHLORIDE 5 MG: 5 TABLET ORAL at 09:37

## 2024-01-09 RX ADMIN — NICARDIPINE HYDROCHLORIDE 7.5 MG/HR: 2.5 INJECTION, SOLUTION INTRAVENOUS at 00:40

## 2024-01-09 RX ADMIN — LISINOPRIL 40 MG: 20 TABLET ORAL at 08:15

## 2024-01-09 RX ADMIN — DEXAMETHASONE SODIUM PHOSPHATE 10 MG: 10 INJECTION, SOLUTION INTRAMUSCULAR; INTRAVENOUS at 13:27

## 2024-01-09 RX ADMIN — POLYETHYLENE GLYCOL 3350 17 G: 17 POWDER, FOR SOLUTION ORAL at 08:16

## 2024-01-09 NOTE — PROGRESS NOTES
[facility]  Progress Note  Name: Mateus Mckeon I  MRN: [id number]  Unit/Bed#: ICU 10 I Date of Admission: 1/5/2024   Date of Service: 1/9/2024 I Hospital Day: 4    Assessment/Plan   * Subarachnoid hemorrhage (HCC)  Assessment & Plan  S/p Cerebral angiography on 1/5/24 by Dr. Mittal-  angio negative.   Right basilar cistern SAH, BD 10, HH 2, MF 3.  P/w HA that started on 12/31/23, 5 days prior to admission on 1/4/24.   Hx ASA use for hx multiple strokes, reversed with DDAVP.  MRIs negative for source of SAH, new acute/subacute infarcts noted.   Pt on hemodialysis.   On current exam, c/o headache, otherwise non-focal. GCS 15.     Imaging:  MRI brain wo 1/8/24:Stable hematoma in the right CP angle, prepontine and premedullary cisterns. New subarachnoid hemorrhage over the convexities and within the lateral ventricles likely due to redistribution.Few tiny scattered recent infarcts in multiple vascular distributions.  MRI cervical spine wo 1/8/24:No identifiable etiology for subarachnoid hemorrhage.Mild congenital canal stenosis.New marrow edema at C5-C6 with severe disc space narrowing that is similar to recent CT. Modic type I endplate degenerative changes favored over infection but recommend clinical correlation and follow-up imaging if warranted.    Plan:  Continue to monitor neuro exam closely.  STAT CTA with decline in GCS > 2 pts in 1 hour.  Maintain SBP < 160 mmHg.  Will maintain on SAH pathway at this time.   Spasm watch  Daily TCDs: R 1.04, L 1.47  Continue Nimodipine 60mg Q4H  Normonatremia (today 134), euvolemia (-2744/24H, part of output from hemodialysis), Hgb >8 (today 9.4)   SBP<160 mmHg  Mobilize as tolerated.  DVT ppx: SCD's, pharm dvt ppx was held due to new SAH on MRI brain 1/8, will further discuss when to resume pharm dvt ppx.   Pt with hx of strokes. Home ASA on hold due to acute SAH. Pt noted on recent MRI brain with  small scattered subacute/recent  "infarcts. Recommend neurology consult/stroke mgt.   Plan to likely repeat vascular imaging/angiogram later this week pending patient progress     Neurosurgery will continue to follow. Call with questions.      End stage kidney disease (HCC)  Assessment & Plan  Lab Results   Component Value Date    EGFR 5 01/09/2024    EGFR 3 01/08/2024    EGFR 4 01/07/2024    CREATININE 9.84 (H) 01/09/2024    CREATININE 14.28 (H) 01/08/2024    CREATININE 12.62 (H) 01/07/2024     On HD (MWF)             Subjective/Objective     Chief Complaint:\"My head hurts\"    Subjective: Patient reports that he has a headache that he rates as 7/10 on the pain scale.  Patient reports chronic numbness in bilateral hands and feet secondary to peripheral neuropathy.  Patient denies any new weakness in bilateral upper or lower extremities.  Reports mild dizziness and nausea but denies vomiting.    Objective: Alert and awake, laying in bed while undergoing hemodialysis, no acute distress.    I/O         01/07 0701 01/08 0700 01/08 0701 01/09 0700 01/09 0701  01/10 0700    P.O. 1170 190     I.V. (mL/kg) 50 (0.5) 1365.4 (14.1) 299.6 (3.1)    Total Intake(mL/kg) 1220 (12.8) 1555.4 (16.1) 299.6 (3.1)    Urine (mL/kg/hr) 0 (0) 0 (0)     Other  4300     Total Output 0 4300     Net +1220 -2744.6 +299.6           Unmeasured Stool Occurrence   1 x            Invasive Devices       Peripheral Intravenous Line  Duration             Peripheral IV 01/08/24 Right;Upper;Ventral (anterior) Arm <1 day    Peripheral IV 01/09/24 Dorsal (posterior);Right Hand <1 day              Line  Duration             Hemodialysis Access 11/02/20 Left Forearm 1163 days    Hemodialysis AV Fistula 11/09/20 Left Other (Comment) 1155 days                    Physical Exam:  Vitals: Blood pressure 138/60, pulse 72, temperature 98.2 °F (36.8 °C), temperature source Oral, resp. rate 19, height 5' 5\" (1.651 m), weight 96.8 kg (213 lb 6.5 oz), SpO2 100%.,Body mass index is 35.51 " "kg/m².      General appearance:  Appears stated age  Head: Normocephalic, without obvious abnormality, atraumatic  Eyes: EOMI, PERRL  Neck: supple, symmetrical, trachea midline   Lungs: non labored breathing  Heart: regular heart rate  Neurologic:   Mental status: Alert, oriented, speech is clear and fluent, thought content appropriate  Cranial nerves: grossly intact (Cranial nerves II-XII)  Sensory: normal to LT x 4 except decreased in bilateral hands and legs.  Motor: moving all extremities without focal weakness, strength 5/5 throughout  Coordination:  no drift in bilateral upper extremities      Lab Results:  Results from last 7 days   Lab Units 01/09/24 0436 01/08/24  0654 01/07/24  0541   WBC Thousand/uL 10.48* 13.04* 9.89   HEMOGLOBIN g/dL 9.4* 10.3* 9.1*   HEMATOCRIT % 27.9* 30.1* 27.8*   PLATELETS Thousands/uL 220 217 195   NEUTROS PCT % 83* 80* 71   MONOS PCT % 6 6 8   EOS PCT % 0 3 2     Results from last 7 days   Lab Units 01/09/24 0436 01/08/24  0654 01/07/24  0541 01/05/24  0548 01/04/24  2243 01/03/24  0934   POTASSIUM mmol/L 4.3 5.2 4.2   < > 5.3 4.5   CHLORIDE mmol/L 94* 93* 97   < > 91* 93*   CO2 mmol/L 25 19* 25   < > 27 34*   BUN mg/dL 41* 62* 45*   < > 56* 36*   CREATININE mg/dL 9.84* 14.28* 12.62*   < > 13.49* 9.31*   CALCIUM mg/dL 8.5 8.3* 7.8*   < > 9.0 8.4   ALK PHOS U/L  --   --   --   --  79 74   ALT U/L  --   --   --   --  10 10   AST U/L  --   --   --   --  15 15    < > = values in this interval not displayed.     Results from last 7 days   Lab Units 01/09/24 0436 01/08/24  0654 01/07/24  0541   MAGNESIUM mg/dL 2.1 2.3 2.1     Results from last 7 days   Lab Units 01/09/24 0436 01/08/24  0654 01/07/24  0541   PHOSPHORUS mg/dL 6.2* 6.1* 6.0*     Results from last 7 days   Lab Units 01/05/24  0614 01/05/24  0351   INR   --  1.04   PTT seconds 29 30     No results found for: \"TROPONINT\"  ABG:  Lab Results   Component Value Date    PHART 7.454 (H) 02/21/2020    [id number] 33.2 (L) 02/21/2020 "    PO2ART 170.0 (H) 02/21/2020    [id number] 22.8 02/21/2020    BEART -0.8 02/21/2020    SOURCE Radial, Right 02/21/2020       Imaging Studies: I have personally reviewed pertinent reports and I have personally reviewed pertinent films in PACS    EKG, Pathology, and Other Studies: I have personally reviewed pertinent reports.      PLEASE NOTE:  This encounter may have been completed utilizing the [employer] Direct Speech Voice Recognition Software. Grammatical errors, random word insertions, pronoun errors and incomplete sentences are occasional consequences of the system due to software limitations, ambient noise and hardware issues.These may be missed by proof reading prior to affixing electronic signature. Any questions or concerns about the content, text or information contained within the body of this dictation should be directly addressed to the advanced practitioner or physician for clarification.

## 2024-01-09 NOTE — ASSESSMENT & PLAN NOTE
Lab Results   Component Value Date    EGFR 5 01/09/2024    EGFR 3 01/08/2024    EGFR 4 01/07/2024    CREATININE 9.84 (H) 01/09/2024    CREATININE 14.28 (H) 01/08/2024    CREATININE 12.62 (H) 01/07/2024     On HD (MWF)

## 2024-01-09 NOTE — PROGRESS NOTES
[facility]  Progress Note: Critical Care  Name: Mateus Mckeon 38 y.o. male I MRN: [id number]  Unit/Bed#: ICU 10 I Date of Admission: 1/5/2024   Date of Service: 1/9/2024 I Hospital Day: 4    Assessment/Plan   Neuro:   Neuro/Psych/ENT: SAH / Headache due to SAH  Plan: Frequent neuro checks. CAM-ICU. Delirium precautions. Frequent re-orientation. Regulate sleep/wake cycle. Seizure precautions. Spasm watch with daily TCDs, q4 neuro checks, SBP < 160. Mastoiditis seen on MRI on 1/8/2024.    Nimodipine 60 mg q4 x 21 days  Neurosurgery recs: plan for CTA later on this week  ENT recs: incidental finding, no signs of infection, no acute ENT interventions at this time    Analgesia: Multimodal.  Tylenol 975 mg q6 PO  Lexapro 20 mg daily  Gabapentin 300 mg daily (after dialysis on dialysis days)  Oxycodone IR 2.5 or 5 mg q4 PRN  Dilaudid 0.2 q4 IV PRN       CV: HTN / HLD  Plan: Continuous cardiopulmonary monitoring. Maintain MAP >65. Monitor resuscitative endpoints.   Cardene drip 1-15 mg/hr with goal to transition off today  Labetalol 400 TID  Hydralazine 50 mg TID  Lisinopril 40 mg BID  Nifedipine 90 mg QHS  Nimodipine 60 mg q4  Atorvastatin 40 mg daily  PRN: hydralazine 10 mg q4 IV PRN, labetalol 10 mg q6 IV PRN,      Pulm: No acute issues  Plan: Continuous pulse oximetry. Incentive spirometry when appropriate. Pulmonary toilet. Elevate HOB. Maintain O2 sat >90%. Aspiration precautions.      GI: GERD / nausea  Plan: Bowel regimen: Dulcolax prn. Monitor stool output and quality.  GI prophylaxis: Famotidine   Zofran 4 mg q4 IV prn  Reglan 10 mg q6 IV prn  Compazine 5 mg q6 PO prn     /Renal: ESRD   Plan: Strict I/O. Monitor urine output and renal indices. Avoid nephrotoxins.   PhosLo  Cinacalcet  HD schedule per nephrology - will need tomorrow in a row following contrast MRI  Consult: Nephrology consulted and recommendations appreciated    F/E/N: No acute issues  Plan:   F: none  continuous. Fluid resuscitation prn.  E: Monitor and replete electrolytes for ionized calcium >1.12, Mg >2, Phos >3, K >4.  N: Renal diet     Endo: DM1 / secondary hyperparthyroidism  Insulin: home insulin pump  Steroids: not indicated  Plan: Monitor blood glucose for goal 140-180.   Consult: Endocrinology consulted and recommendations appreciated                Heme: No acute issues  Plan: Monitor Hgb and transfuse for Hgb <7 or based on hemodynamics if actively bleeding. Monitor platelets.  VTE prophylaxis: hold for now. Sequential compression devices and/or foot pumps.     ID: No acute issues  Plan: Monitor fever curve and WBC. Maintain normothermia. Daily CHG bath, Peridex oral rinse, and nasal antiseptic while in ICU.     MSK/Skin: No acute issues  Plan: Frequent turning and repositioning. Pressure injury prevention. Monitor for skin breakdown. PT/OT when appropriate. Encourage OOBTC and ambulation when appropriate. Local wound care prn.       Disposition: Critical care    ICU Core Measures     A: Assess, Prevent, and Manage Pain Has pain been assessed? Yes  Need for changes to pain regimen? No   B: Both SAT/SAT  N/A   C: Choice of Sedation RASS Goal: 0 Alert and Calm or +1 Restless  Need for changes to sedation or analgesia regimen? No   D: Delirium CAM-ICU: Negative   E: Early Mobility  Plan for early mobility? Yes   F: Family Engagement Plan for family engagement today? Yes         Prophylaxis:  VTE Contraindicated secondary to: SAH   Stress Ulcer  covered byfamotidine (PEPCID) 40 MG tablet [[id number]] (Long-Term Med), famotidine (PEPCID) tablet 40 mg [[id number]]        Significant 24hr Events     24hr events: NAEO.     Subjective  No N/V this morning; nausea during reassessment during rounds. Persistent headache 6/10 this morning that decreased to 4/10 with use of Tylenol and has stayed at 4/10 upon reassessment during rounds. Cardene drip O/N. CPAP tolerated for a few minutes. Willing to cooperate with  PT/OT today. Tolerated hemodialysis well today.    Review of Systems   Constitutional:  Negative for activity change and appetite change.   Eyes:  Positive for photophobia.   Gastrointestinal:  Positive for nausea. Negative for constipation.   Neurological:  Positive for headaches. Negative for weakness and light-headedness.        Objective                            Vitals I/O      Most Recent Min/Max in 24hrs   Temp 98.2 °F (36.8 °C) Temp  Min: 98.2 °F (36.8 °C)  Max: 100.6 °F (38.1 °C)   Pulse 72 Pulse  Min: 72  Max: 96   Resp 16 Resp  Min: 10  Max: 28   /69 BP  Min: 99/56  Max: 207/95   O2 Sat 98 % SpO2  Min: 93 %  Max: 100 %      Intake/Output Summary (Last 24 hours) at 1/9/2024 0856  Last data filed at 1/9/2024 0815  Gross per 24 hour   Intake 1695.41 ml   Output 4300 ml   Net -2604.59 ml       Diet Renal; Renal [place]; No; No    Invasive Monitoring           Physical Exam   Physical Exam  Eyes:      General: Neglect     Conjunctiva/sclera: Conjunctivae normal.      Pupils: Pupils are equal, round, and reactive to light.   Skin:     General: Skin is warm.   HENT:      Head: Normocephalic. Laceration (post-angio laceration) present.      Right Ear: Tympanic membrane normal.      Left Ear: Tympanic membrane normal.      Mouth/Throat:      Mouth: Mucous membranes are moist.   Cardiovascular:      Rate and Rhythm: Normal rate.      Pulses: Normal pulses.   Musculoskeletal:         General: Normal range of motion.   Abdominal: General: Bowel sounds are normal.      Palpations: Abdomen is soft.   Constitutional:       Appearance: He is well-developed and well-nourished.   Pulmonary:      Effort: Pulmonary effort is normal.      Breath sounds: Normal breath sounds.   Psychiatric:         Speech: Speech is not no receptive aphasia or no expressive aphasia.   Neurological:      General: No focal deficit present.      Mental Status: He is alert and oriented to person, place and time.      Cranial Nerves: No  dysarthria or facial asymmetry.      Sensory: No sensory deficit.      Motor: gross motor function is at baseline for patient. Strength full and intact in all extremities.            Diagnostic Studies      EKG: No new EKG since 1/6/24  Imaging:  I have personally reviewed pertinent reports.   and I have personally reviewed pertinent films in PACS  MRI brain wo 1/8/24:Stable hematoma in the right CP angle, prepontine and premedullary cisterns. New subarachnoid hemorrhage over the convexities and within the lateral ventricles likely due to redistribution.Few tiny scattered recent infarcts in multiple vascular distributions.  MRI cervical spine wo 1/8/24:No identifiable etiology for subarachnoid hemorrhage.Mild congenital canal stenosis.New marrow edema at C5-C6 with severe disc space narrowing that is similar to recent CT. Modic type I endplate degenerative changes favored over infection but recommend clinical correlation and follow-up imaging if warranted.     Medications:  Scheduled PRN   acetaminophen, 975 mg, Q6H HALIE  atorvastatin, 40 mg, QPM  calcium acetate, 667 mg, TID With Meals  chlorhexidine, 15 mL, Q12H HALIE  cinacalcet, 60 mg, Daily  cloNIDine, 0.3 mg, Weekly  escitalopram, 20 mg, Daily  famotidine, 40 mg, Daily  gabapentin, 100 mg, TID  hydrALAZINE, 50 mg, TID  labetalol, 400 mg, TID  lisinopril, 40 mg, BID  NIFEdipine, 90 mg, HS  niMODipine, 60 mg, Q4H HALIE  patient maintained insulin pump, 1 each, Q8H  polyethylene glycol, 17 g, Daily  senna-docusate sodium, 1 tablet, BID      bisacodyl, 10 mg, Daily PRN  hydrALAZINE, 10 mg, Q4H PRN  HYDROmorphone, 0.2 mg, Q4H PRN  insulin lispro, 300 Units, Daily PRN  labetalol, 10 mg, Q6H PRN  metoclopramide, 10 mg, Q6H PRN  ondansetron, 4 mg, Q4H PRN  oxyCODONE, 2.5 mg, Q4H PRN   Or  oxyCODONE, 5 mg, Q4H PRN  prochlorperazine, 5 mg, Q6H PRN       Continuous    niCARdipine, 1-15 mg/hr, Last Rate: 2.5 mg/hr (01/09/24 4639)         Labs:    CBC    Recent Labs      01/08/24  0654 01/09/24  0436   WBC 13.04* 10.48*   HGB 10.3* 9.4*   HCT 30.1* 27.9*    220     BMP    Recent Labs     01/08/24  0654 01/09/24  0436   SODIUM 135 134*   K 5.2 4.3   CL 93* 94*   CO2 19* 25   AGAP 23 15   BUN 62* 41*   CREATININE 14.28* 9.84*   CALCIUM 8.3* 8.5       Coags    No recent results     Additional Electrolytes  Recent Labs     01/08/24  0654 01/09/24  0436   MG 2.3 2.1   PHOS 6.1* 6.2*          Blood Gas    No recent results  No recent results LFTs  No recent results    Infectious  No recent results  Glucose  Recent Labs     01/08/24  0654 01/09/24  0436   GLUC 136 180*               Non-Critical Care Time Statement: I have spent a total time of 60 minutes in caring for this patient including Diagnostic results, Prognosis, Risks and benefits of tx options, Instructions for management, Importance of tx compliance, Impressions, Documenting in the medical record, Reviewing / ordering tests, medicine, procedures  , Obtaining or reviewing history  , and Communicating with other healthcare professionals .     Mercy Arita

## 2024-01-09 NOTE — QUICK NOTE
Writer spoke to Endocrinology fellow, Rosemary Rocha, over [employer] about the management of pt's home insulin pump with consideration of possible transfer to step-down. Fellow stated that patient should be able to use OmniPod pump on stepdown since he is self managing and has Dexcom sensors with his own supplies and changes. If mentation worsens, medical team will have to manage his sugars.

## 2024-01-09 NOTE — PLAN OF CARE
Net even as ordered. Patient dialyzing for 3 hours on 3 K bath for serum K of  4.3  per protocol. Treatment plan reviewed with Nephrology, Dr PHILLIP Gaston.      Post-Dialysis RN Treatment Note    Blood Pressure:  Pre 144/62 mm/Hg  Post 178/69 mmHg   EDW  93 kg    Weight:  96.5kg     Mode of weight measurement: Bed Scale   Volume Removed  500 ml, net even   Treatment duration 3 hours   NS given  No    Treatment shortened? No   Medications given during Rx None Reported   Estimated Kt/V  Not Applicable   Access type: AV fistula   Access Issues: No    Report called to primary nurse   Yes  N Christopher YOUSSEF    Problem: METABOLIC, FLUID AND ELECTROLYTES - ADULT  Goal: Electrolytes maintained within normal limits  Description: INTERVENTIONS:  - Monitor labs and assess patient for signs and symptoms of electrolyte imbalances  - Administer electrolyte replacement as ordered  - Monitor response to electrolyte replacements, including repeat lab results as appropriate  - Instruct patient on fluid and nutrition as appropriate  Outcome: Progressing     Problem: METABOLIC, FLUID AND ELECTROLYTES - ADULT  Goal: Fluid balance maintained  Description: INTERVENTIONS:  - Monitor labs   - Monitor I/O and WT  - Instruct patient on fluid and nutrition as appropriate  - Assess for signs & symptoms of volume excess or deficit  Outcome: Progressing

## 2024-01-09 NOTE — OCCUPATIONAL THERAPY NOTE
Occupational Therapy Evaluation     Patient Name: Mateus Mckeon  Today's Date: 1/9/2024  Problem List  Principal Problem:    Subarachnoid hemorrhage (HCC)  Active Problems:    End stage kidney disease (HCC)    Anemia due to chronic kidney disease, on chronic dialysis (HCC)    Past Medical History  Past Medical History:   Diagnosis Date    Acute kidney injury (HCC)     Ambulates with cane     Anuria     Anxiety     Cellulitis of right elbow 03/31/2021    Chronic kidney disease     Depression     Diabetes mellitus (HCC)     Diarrhea     Emesis 10/24/2020    End stage renal disease (HCC) 02/11/2018    Formatting of this note might be different from the original. Last Assessment & Plan:  Secondary to DM.  On nightly PD.  Followed by Nephro.  Patient considering transplant for kidney and pancreas through [facility] Formatting of this note might be different from the original. Last Assessment & Plan:  Formatting of this note might be different from the original. 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    Esophagitis 07/21/2015    Falls     Gastroparesis     GERD (gastroesophageal reflux disease)     History of shingles 2010    History of transfusion 02/2018    no adverse reaction    Hyperlipidemia     Hyperphosphatemia     Hypertension     Hypoglycemia 07/15/2022    Itching     Mastoiditis of right side 07/15/2022    Muscle weakness     general unsteadiness    Obesity (BMI 30.0-34.9) 09/09/2019    Orthostatic hypotension 10/25/2020    Peripheral polyneuropathy 11/20/2019    PONV (postoperative nausea and vomiting) 01/26/2018    Protein-calorie malnutrition (HCC) 11/23/2020    Recurrent peritonitis (HCC) due to peritoneal dialysis catheter 07/31/2020    Retinopathy     Seizures (HCC)     early 2020 - one time    Skin abnormality     some dime size areas where skin was scratched from itching    Spontaneous bacterial peritonitis (HCC) 10/19/2020    Squamous cell skin cancer      left temple    Stroke (HCC)     x2 - off balance/no driving/fatigue    Swelling of both lower extremities     Traumatic onycholysis 07/21/2022    Vomiting     Wears glasses      Past Surgical History  Past Surgical History:   Procedure Laterality Date    CARDIAC ELECTROPHYSIOLOGY PROCEDURE N/A 9/21/2023    Procedure: Cardiac loop recorder explant;  Surgeon: Parish Morgan MD;  [facility]: [facility];  Service: Cardiology    CARDIAC LOOP RECORDER  05/2018    COLONOSCOPY      EGD      EYE SURGERY Right     IR AV FISTULAGRAM/GRAFTOGRAM  02/23/2021    IR CEREBRAL ANGIOGRAPHY / INTERVENTION  1/5/2024    IR TUNNELED CENTRAL LINE PLACEMENT  02/16/2021    IR TUNNELED DIALYSIS CATHETER PLACEMENT  11/18/2020    IR TUNNELED DIALYSIS CATHETER REMOVAL  02/12/2021    IR TUNNELED DIALYSIS CATHETER REMOVAL  03/11/2021    MOHS SURGERY Left 12/14/2022    Left temple with Dr. Hassan    PERITONEAL CATHETER INSERTION N/A 08/27/2018    Procedure: UNROOF PD CATHETER;  Surgeon: Felipe Lindo DO;  Location: [facility];  Service: General    TX ARTERIOVENOUS ANASTOMOSIS OPEN DIRECT Left 11/09/2020    Procedure: CREATION FISTULA  ARTERIOVENOUS (AV) - LEFT WRIST;  Surgeon: Placido Altamirano MD;  Location: [facility];  Service: Vascular    TX ESOPHAGOGASTRODUODENOSCOPY TRANSORAL DIAGNOSTIC N/A 04/18/2019    Procedure: ESOPHAGOGASTRODUODENOSCOPY (EGD);  Surgeon: Ale Figueroa MD;  Location: [facility];  Service: Gastroenterology    TX LAPS INSERTION TUNNELED INTRAPERITONEAL CATHETER N/A 08/06/2018    Procedure: LAPAROSCOPIC PD CATHETER PLACEMENT;  Surgeon: Felipe Lindo DO;  [facility];  Service: General    TX REMOVAL TUNNELED INTRAPERITONEAL CATHETER N/A 11/18/2020    Procedure: REMOVAL CATHETER PERITONEAL DIALYSIS;  Surgeon: Abdifatah Ty MD;  [facility];  Service: General    TONSILLECTOMY      UPPER GASTROINTESTINAL ENDOSCOPY               01/09/24 1419   OT Last Visit   OT Visit Date 01/09/24   Note Type   Note type  "Evaluation   Pain Assessment   Pain Assessment Tool 0-10   Pain Score 8   Pain Location/Orientation Location: Head   Restrictions/Precautions   Weight Bearing Precautions Per Order No   Other Precautions Telemetry;Bed Alarm;Chair Alarm;Visual impairment;Cognitive;Fall Risk;Pain   Home Living   Type of Home House   Home Layout Two level;Bed/bath upstairs;Stairs to enter with rails  (2STE, FF of stairs to 2nd floor for bed/bath)   Bathroom Shower/Tub Tub/shower unit   Bathroom Toilet Standard   Bathroom Equipment Shower chair   Bathroom Accessibility Accessible   Home Equipment Cane  (SPC for community mobility)   Additional Comments Pt reports living in a 2SH with 2STE and FF of stairs to 2nd floor for bed/bath   Prior Function   Level of [place] Independent with ADLs;Independent with functional mobility;Needs assistance with IADLS   Lives With Family  (parents)   Receives Help From Family   IADLs Independent with meal prep;Independent with medication management;Family/Friend/Other provides transportation   Falls in the last 6 months 1 to 4   Vocational Unemployed   Lifestyle   Autonomy Pt reports being I with ADLs and receivies assistance with IADLs.Pt uses SPC for community mobility   Reciprocal Relationships Pt lives with parents- both retired   Service to Others Pt is unemployed   Intrinsic Gratification Pt enjoys relaxing   General   Family/Caregiver Present Yes   Subjective   Subjective \"i feel dizzy\"   ADL   Where Assessed Other (Comment)  (bathroom)   Eating Assistance 5  Supervision/Setup   Grooming Assistance 5  Supervision/Setup   UB Bathing Assistance 4  Minimal Assistance   LB Bathing Assistance 3  Moderate Assistance   UB Dressing Assistance 4  Minimal Assistance   LB Dressing Assistance 3  Moderate Assistance   LB Dressing Deficit Pull up over hips  (assistance to pull up pants over hips after attempting to go to bathroom)   Toileting Assistance  4  Minimal Assistance   Functional Assistance 3  " Moderate Assistance   Bed Mobility   Supine to Sit Unable to assess   Sit to Supine 5  Supervision   Additional Comments Pt lying supine in bed at the end of the session with all necessary items within reach and RN present   Transfers   Sit to Stand 4  Minimal assistance   Additional items Assist x 1;Increased time required  (standby of another person for safety)   Stand to Sit 4  Minimal assistance   Additional items Assist x 1;Increased time required  (standby of another person for safety)   Toilet transfer 3  Moderate assistance   Additional items Assist x 1;Increased time required   Additional Comments RW   Functional Mobility   Functional Mobility 4  Minimal assistance   Additional Comments A1+1 - standby of another person for safety. Pt was able to demonstrate short distance mobility withmin Ax1 +1 for safety with RW   Additional items Rolling walker   Balance   Static Sitting Fair +   Dynamic Sitting Fair   Static Standing Fair -   Dynamic Standing Poor +   Ambulatory Poor   Activity Tolerance   Activity Tolerance Patient limited by fatigue;Patient limited by pain   Medical Staff Made Aware PT Rere   Nurse Made Aware RN cleared pt for therapy   RUE Assessment   RUE Assessment WFL   LUE Assessment   LUE Assessment WFL   Hand Function   Gross Motor Coordination Functional   Fine Motor Coordination Functional   Psychosocial   Psychosocial (WDL) WDL   Cognition   Overall Cognitive Status WFL   Arousal/Participation Responsive;Alert;Arousable;Cooperative   Attention Attends with cues to redirect   Orientation Level Oriented X4   Memory Within functional limits   Following Commands Follows one step commands with increased time or repetition   Comments Pt was cooperative t/o session   Assessment   Limitation Decreased ADL status;Decreased Safe judgement during ADL;Decreased endurance;Decreased self-care trans;Decreased high-level ADLs   Prognosis Fair   Assessment Pt is 38 y.o. male admitted [facility] 1/5/[facility]24  w/ subarachnoid hemorrhage. Pt received IR CEREBRAL ANGIOGRAPHY / INTERVENTION on 1/5/2023. Pt  has a past medical history of Acute kidney injury, Anuria, Anxiety,Chronic kidney disease, Depression, Diabetes mellitus (HCC), End stage renal disease (HCC) (02/11/2018), Eosinophilic leukocytosis (11/04/2020), Esophagitis (07/21/2015), Falls, Gastroparesis, GERD (gastroesophageal reflux disease), History of shingles (2010), Hyperlipidemia, Hyperphosphatemia, Hypertension, Hypoglycemia (07/15/2022), Itching, Mastoiditis of right side (07/15/2022),  Orthostatic hypotension (10/25/2020), Peripheral polyneuropathy (11/20/2019), Retinopathy, Seizures (HCC), Skin abnormality, Spontaneous bacterial peritonitis (HCC) (10/19/2020), Squamous cell skin cancer, Stroke ([facility]), Swelling of both lower extremities, Traumatic onycholysis (07/21/2022), Vomiting, and Wears glasses.. Pt with active OT orders and activity orders. Pt resides in a Home, with 2 NIRU. Pt lives with mother and father. Pt was I w/ ADLs, received assistance with IADLs, (-) drove. Pt is currently functioning at min A for UB ADLs and mod A for LB ADLs. Pt is functioning at min Ax1 for bed mobility, transfers, and functional mobility. Pt was able to demonstrate toilet transfer with mod Ax1. Pt is limited 2* pain, endurance, activity tolerance, functional mobility, balance, functional standing tolerance, decreased I w/ ADLS/IADLS, strength, and ROM. The Areas of Occupational Performance Areas to address w/ pt include: eating, grooming, bathing/shower, toilet hygiene, dressing, health maintenance, and functional mobility. Based off of an OT evaulation, assessment, and performance functioning, pt is identified as a high complexity.   The patient's raw score on the AM-PAC Daily Activity Inpatient Short Form is 18. A raw score of less than 19 suggests the patient may benefit from discharge to post-acute rehabilitation services. Please refer to the recommendation of the  Occupational Therapist for safe discharge planning.However,  OT recommendation for d/c includes minimum resources III- outpatient therapy. Pt would benefit from continued acute OT services for  3-5x/week to  w/in 10-14 days:  to address functional goals.   Goals   Patient Goals to feel better   LTG Time Frame 10-14   Long Term Goal see goals below   Plan   Treatment Interventions ADL retraining;Functional transfer training;UE strengthening/ROM;Endurance training;Equipment evaluation/education;Compensatory technique education;Continued evaluation;Energy conservation;Activityengagement   Goal Expiration Date 24   OT Frequency 3-5x/wk   Discharge Recommendation   Rehab Resource Intensity Level, OT III (Minimum Resource Intensity)  (outpatient OT)   AM-PAC Daily Activity Inpatient   Lower Body Dressing 2   Bathing 2   Toileting 3   Upper Body Dressing 3   Grooming 4   Eating 4   Daily Activity Raw Score 18   Daily Activity Standardized Score (Calc for Raw Score >=11) 38.66   AM-PAC Applied Cognition Inpatient   Following a Speech/Presentation 3   Understanding Ordinary Conversation 4   Taking Medications 4   Remembering Where Things Are Placed or Put Away 4   Remembering List of 4-5 Errands 4   Taking Care of Complicated Tasks 4   Applied Cognition Raw Score 23   Applied Cognition Standardized Score 53.08   End of Consult   Education Provided Yes   Patient Position at End of Consult Supine;All needs within reach   Nurse Communication Nurse aware of consult     Pt w/ mod I will complete daily grooming tasks w/ adaptive equipment as needed.      Pt will increase standing tolerance to 10 mins w/ mod I w/ adaptive equipment as needed.     Pt will complete functional transfers w/ mod I on and off all surfaces w/ equipment as needed.     Pt will complete functional mobility w/ mod I on and off all surfaces w/ equipment as needed.     Pt will complete tolieting w/ mod I while demonstrating safety techniques w/ DME.      Pt will complete feeding tasks w/ mod I using adaptive devices.     Pt will demonstrate G safety awareness w/ mod I during OT session.      Pt will demonstrate LE body dressing w/ mod I w/ adaptive equipment as needed.     Pt will demonstrate UE body dressing w/ mod I w/ adaptive equipment as needed.     Pt will increase activity tolerance to G during a 30 min OT tx session.    Pt will demonstrate bed mobility skills w/ mod I.     Pt will complete IADLs tasks w/ mod I.     Pt will participate in a formal/functional cognitive assessment as needed to assist with safe discharge planning.         Suzan Kern, OTR/L

## 2024-01-09 NOTE — PHYSICAL THERAPY NOTE
"                                                                                  PHYSICAL THERAPY NOTE          Patient Name: Mateus Mckeon  Today's Date: 1/9/2024 01/09/24 1420   PT Last Visit   PT Visit Date 01/09/24   Note Type   Note Type Treatment   Pain Assessment   Pain Assessment Tool 0-10   Pain Score 8   Pain Location/Orientation Location: Head   Restrictions/Precautions   Weight Bearing Precautions Per Order No   Other Precautions Pain;Fall Risk;Telemetry;Multiple lines;Cognitive;Bed Alarm;Chair Alarm   General   Chart Reviewed Yes   Family/Caregiver Present Yes  (mother)   Cognition   Orientation Level Oriented X4   Subjective   Subjective Pt agreeable to mobility \"I can't do much, I am to dizzy and the light is bothering me\"   Bed Mobility   Sit to Supine 5  Supervision   Additional items Assist x 1   Transfers   Sit to Stand 4  Minimal assistance   Additional items Assist x 1;Increased time required   Stand to Sit 4  Minimal assistance   Additional items Assist x 1;Increased time required   Toilet transfer 3  Moderate assistance   Additional items Assist x 1;Increased time required   Ambulation/Elevation   Gait pattern Steppage;Shuffling;Decreased foot clearance;Short stride;Excessively slow   Gait Assistance 4  Minimal assist   Additional items Assist x 1   Assistive Device Rolling walker   Distance 15   Balance   Static Sitting Fair +   Dynamic Sitting Fair   Static Standing Fair -   Dynamic Standing Poor +   Ambulatory Poor   Endurance Deficit   Endurance Deficit Yes   Activity Tolerance   Activity Tolerance Patient limited by fatigue;Patient limited by pain   Medical Staff Made Aware OT for D/C planning   Nurse Made Aware yes, nsg gave clearance to work with pt, present to see pt progress   Assessment   Prognosis Good   Problem List Decreased endurance;Impaired balance;Decreased mobility;Pain;Impaired vision;Decreased safety awareness   Assessment Pt required increased A for steps from " W/C to toilet without AD with deficits in balance and coordination. Trial with RW which improved balance and decreased need for as much A from therapist. Pt required increased A to transfer from low surface. Pt required verbal instruction for chair approach to improve safety. Educated in importance of continued early mobility to decrease risks of immobility. Pt will benefit from continued inpt skilled PT and rehab to maximize functional mobility & safety.   Goals   Patient Goals To decrease dizziness   STG Expiration Date 01/21/24   Plan   Treatment/Interventions OT;Spoke to case management;Spoke to nursing;Gait training;Bed mobility;Patient/family training;Endurance training;LE strengthening/ROM;Functional transfer training   PT Frequency 3-5x/wk   Discharge Recommendation   Rehab Resource Intensity Level, PT III (Minimum Resource Intensity)   AM-PAC Basic Mobility Inpatient   Turning in Flat Bed Without Bedrails 3   Lying on Back to Sitting on Edge of Flat Bed Without Bedrails 3   Moving Bed to Chair 3   Standing Up From Chair Using Arms 3   Walk in Room 3   Climb 3-5 Stairs With Railing 2   Basic Mobility Inpatient Raw Score 17   Basic Mobility Standardized Score 39.67   Highest Level Of Mobility   JH-HLM Goal 5: Stand one or more mins   JH-HLM Achieved 6: Walk 10 steps or more     Rere Berger PT, DPT

## 2024-01-09 NOTE — PLAN OF CARE
Problem: PHYSICAL THERAPY ADULT  Goal: Performs mobility at highest level of function for planned discharge setting.  See evaluation for individualized goals.  Description: Treatment/Interventions: Functional transfer training, LE strengthening/ROM, Therapeutic exercise, Endurance training, Elevations, Cognitive reorientation, Patient/family training, Equipment eval/education, Bed mobility, Gait training, Spoke to nursing, Spoke to case management, OT, Family          See flowsheet documentation for full assessment, interventions and recommendations.  Note: Prognosis: Good  Problem List: Decreased endurance, Impaired balance, Decreased mobility, Pain, Impaired vision, Decreased safety awareness  Assessment: Pt required increased A for steps from W/C to toilet without AD with deficits in balance and coordination. Trial with RW which improved balance and decreased need for as much A from therapist. Pt required increased A to transfer from low surface. Pt required verbal instruction for chair approach to improve safety. Educated in importance of continued early mobility to decrease risks of immobility. Pt will benefit from continued inpt skilled PT and rehab to maximize functional mobility & safety.  Barriers to Discharge: Inaccessible home environment     Rehab Resource Intensity Level, PT: III (Minimum Resource Intensity)    See flowsheet documentation for full assessment.

## 2024-01-09 NOTE — ASSESSMENT & PLAN NOTE
S/p Cerebral angiography on 1/5/24 by Dr. Mittal-  angio negative.   Right basilar cistern SAH, BD 10, HH 2, MF 3.  P/w HA that started on 12/31/23, 5 days prior to admission on 1/4/24.   Hx ASA use for hx multiple strokes, reversed with DDAVP.  MRIs negative for source of SAH, new acute/subacute infarcts noted.   Pt on hemodialysis.   On current exam, c/o headache, otherwise non-focal. GCS 15.     Imaging:  MRI brain wo 1/8/24:Stable hematoma in the right CP angle, prepontine and premedullary cisterns. New subarachnoid hemorrhage over the convexities and within the lateral ventricles likely due to redistribution.Few tiny scattered recent infarcts in multiple vascular distributions.  MRI cervical spine wo 1/8/24:No identifiable etiology for subarachnoid hemorrhage.Mild congenital canal stenosis.New marrow edema at C5-C6 with severe disc space narrowing that is similar to recent CT. Modic type I endplate degenerative changes favored over infection but recommend clinical correlation and follow-up imaging if warranted.    Plan:  Continue to monitor neuro exam closely.  STAT CTA with decline in GCS > 2 pts in 1 hour.  Maintain SBP < 160 mmHg.  Will maintain on SAH pathway at this time.   Spasm watch  Daily TCDs: R 1.04, L 1.47  Continue Nimodipine 60mg Q4H  Normonatremia (today 134), euvolemia (-2744/24H, part of output from hemodialysis), Hgb >8 (today 9.4)   SBP<160 mmHg  Mobilize as tolerated.  DVT ppx: SCD's, pharm dvt ppx was held due to new SAH on MRI brain 1/8, will further discuss when to resume pharm dvt ppx.   Pt with hx of strokes. Home ASA on hold due to acute SAH. Pt noted on recent MRI brain with  small scattered subacute/recent infarcts. Recommend neurology consult/stroke mgt.   Plan to likely repeat vascular imaging/angiogram later this week pending patient progress     Neurosurgery will continue to follow. Call with questions.

## 2024-01-09 NOTE — PROGRESS NOTES
Procedure note - Nephrology   Mateus CLOTILDE Mckeon 38 y.o. male MRN: [id number]  Unit/Bed#: ICU 10 Encounter: [id number]      Assessment / Plan:  ESRD [place] [facility] [place] Wednesday Friday-plan for hemodialysis today in the setting of MRI this morning to reduce risk of NSF although risk is less with class II gadolinium agents  -Because of end-stage renal disease due to diabetic nephropathy, previously on PD but changed to HD December 2020  -Plan for 3 consecutive days of dialysis status post MRI 1/9, avoiding aggressive UF and plan for even HD again tomorrow.  Access-left upper extremity AV fistula well-functioning  Subarachnoid hemorrhage-management per ICU and neurosurgical teams  Hypertension-does have history of hypertensive emergency in the past, off Cardene drip, BP low normal on clonidine 0.3 mg weekly patch, hydralazine 50 mg p.o. 3 times daily, labetalol 400 mg p.o. 3 times daily, lisinopril 40 mg p.o. twice daily, Nimotop 60 mg p.o. every 4hr, nifedipine 90 mg p.o. daily at bedtime as well as as needed hydralazine  -Of note, hydralazine dose has been doubled in the last 24 hours as has lisinopril dose.  Hyperphosphatemia-continue PhosLo 1 tab 3 times daily with meals, monitor Phos outpatient, on Velphoro as an outpatient, Phos 6.2 today  Secondary hyperparathyroidism in origin-continue Sensipar 60 mg daily, monitor PTH outpatient.  May hold Sensipar if nausea persists  Diabetes mellitus type 2 in the setting of ESRD-management per primary team  History of hyperkalemia-on Veltassa 5 mg on nondialysis days as an outpatient, potassium stable  Nonelevated anion gap metabolic acidosis-bicarbonate 25, resolved with HD, monitor BMP  Anemia of CKD-hemoglobin at goal, hemoglobin 9.4 today, monitor CBC        Subjective:   Patient seen and examined by me on hemodialysis approximately 9:37 AM.  UF goal even, blood flow 400 mL/min, dialysate flow 600 mL/min, blood pressure 141/59.  Patient complains of  "headache.  No other complaints.  Family updated at bedside.      Objective:     Vitals: Blood pressure 99/58, pulse 66, temperature 98.9 °F (37.2 °C), temperature source Oral, resp. rate 14, height 5' 5\" (1.651 m), weight 96.8 kg (213 lb 6.5 oz), SpO2 98%.,Body mass index is 35.51 kg/m².Temp (24hrs), Av.4 °F (37.4 °C), Min:98.2 °F (36.8 °C), Max:100.6 °F (38.1 °C)      Weight (last 2 days)       Date/Time Weight    24 1512 96.8 (213.41)    Comment rows:    OBSERV: Pt assessed pre-HDTX at 24 1512              Intake/Output Summary (Last 24 hours) at 2024 1351  Last data filed at 2024 1200  Gross per 24 hour   Intake 1773.74 ml   Output 4800 ml   Net -3026.26 ml     I/O last 24 hours:  In: 1963.7 [P.O.:190; I.V.:1773.7]  Out: 4800 [Other:4800]        Physical Exam:   Physical Exam  Vitals reviewed.   Constitutional:       General: He is not in acute distress.     Appearance: Normal appearance. He is well-developed. He is not diaphoretic.   HENT:      Head: Normocephalic and atraumatic.      Nose: Nose normal.      Mouth/Throat:      Mouth: Mucous membranes are moist.      Pharynx: No oropharyngeal exudate.   Eyes:      General: No scleral icterus.        Right eye: No discharge.         Left eye: No discharge.   Neck:      Thyroid: No thyromegaly.   Cardiovascular:      Rate and Rhythm: Normal rate and regular rhythm.      Heart sounds: Normal heart sounds.   Pulmonary:      Effort: Pulmonary effort is normal.      Breath sounds: Normal breath sounds. No wheezing or rales.   Abdominal:      General: Bowel sounds are normal. There is no distension.      Palpations: Abdomen is soft.      Tenderness: There is no abdominal tenderness.   Musculoskeletal:         General: No swelling. Normal range of motion.      Cervical back: Neck supple.   Lymphadenopathy:      Cervical: No cervical adenopathy.   Skin:     General: Skin is warm and dry.      Coloration: Skin is not jaundiced.      Findings: No " rash.   Neurological:      Mental Status: He is alert.      Comments: awake   Psychiatric:         Behavior: Behavior normal.      Comments: Flat affect         Invasive Devices       Peripheral Intravenous Line  Duration             Peripheral IV 01/08/24 Right;Upper;Ventral (anterior) Arm <1 day    Peripheral IV 01/09/24 Dorsal (posterior);Right Hand <1 day              Line  Duration             Hemodialysis Access 11/02/20 Left Forearm 1163 days    Hemodialysis AV Fistula 11/09/20 Left Other (Comment) 1156 days                    Medications:    Scheduled Meds:  Current Facility-Administered Medications   Medication Dose Route Frequency Provider Last Rate    acetaminophen  975 mg Oral Q6H [facility] Radha Steen MD      atorvastatin  40 mg Oral QPM Juan Woodruff MD      bisacodyl  10 mg Rectal Daily PRN DEISI Tony      calcium acetate  667 mg Oral TID With Meals Juan Woodruff MD      chlorhexidine  15 mL Mouth/Throat Q12H HALIE DEISI Tony      cinacalcet  60 mg Oral Daily Tyler Anand,       cloNIDine  0.3 mg Transdermal Weekly Andressa Marcos MD      escitalopram  20 mg Oral Daily Juan Woodruff MD      famotidine  40 mg Oral Daily Juan Woodruff MD      gabapentin  300 mg Oral Daily Hansel Schultz, DO      hydrALAZINE  10 mg Intravenous Q4H PRN DEISI Egan      hydrALAZINE  50 mg Oral TID Cinthia Dempsey MD      HYDROmorphone  0.2 mg Intravenous Q4H PRN Hansel Schultz DO      insulin lispro  300 Units Subcutaneous Insulin Pump Daily PRN Rosemary Rocha DO      labetalol  10 mg Intravenous Q6H PRN Hansel Schultz DO      labetalol  400 mg Oral TID Juan Woodruff MD      lisinopril  40 mg Oral BID Hansel Schultz DO      metoclopramide  10 mg Intravenous Q6H PRN DEISI Egan      niCARdipine  1-15 mg/hr Intravenous Titrated Hansel Schultz DO Stopped (01/09/24 1230)    NIFEdipine  90 mg Oral HS Juan Woodruff MD      niMODipine  60 mg Oral Q4H HALIE  Andressa Marcos MD      ondansetron  4 mg Intravenous Q4H PRN DEISI Egan      oxyCODONE  2.5 mg Oral Q4H PRN Hansel Schultz DO      Or    oxyCODONE  5 mg Oral Q4H PRN Hansel Schultz DO      patient maintained insulin pump  1 each Subcutaneous Q8H Rosemary Rocha DO      polyethylene glycol  17 g Oral Daily Radha Steen MD      prochlorperazine  5 mg Oral Q6H PRN Hansel Schultz DO      senna-docusate sodium  1 tablet Oral BID Cinthia Dempsey MD         PRN Meds:.  bisacodyl    hydrALAZINE    HYDROmorphone    insulin lispro    labetalol    metoclopramide    ondansetron    oxyCODONE **OR** oxyCODONE    prochlorperazine    Continuous Infusions:niCARdipine, 1-15 mg/hr, Last Rate: Stopped (01/09/24 1230)            LAB RESULTS:      Results from last 7 days   Lab Units 01/09/24  0436 01/08/24  0654 01/07/24  0541 01/06/24  0527 01/05/24  0548 01/04/24  2243 01/03/24  0934   WBC Thousand/uL 10.48* 13.04* 9.89 10.25*  --  8.46 7.16   HEMOGLOBIN g/dL 9.4* 10.3* 9.1* 9.6*  --  11.5* 10.7*   HEMATOCRIT % 27.9* 30.1* 27.8* 29.2*  --  34.3* 32.1*   PLATELETS Thousands/uL 220 217 195 208  --  210 215   NEUTROS PCT % 83* 80* 71 82*  --  71 69   LYMPHS PCT % 9* 9* 17 10*  --  17 17   MONOS PCT % 6 6 8 7  --  7 6   EOS PCT % 0 3 2 0  --  4 7*   POTASSIUM mmol/L 4.3 5.2 4.2 4.9 4.7 5.3 4.5   CHLORIDE mmol/L 94* 93* 97 95*  --  91* 93*   CO2 mmol/L 25 19* 25 26  --  27 34*   BUN mg/dL 41* 62* 45* 38*  --  56* 36*   CREATININE mg/dL 9.84* 14.28* 12.62* 10.27*  --  13.49* 9.31*   CALCIUM mg/dL 8.5 8.3* 7.8* 9.1  --  9.0 8.4   ALK PHOS U/L  --   --   --   --   --  79 74   ALT U/L  --   --   --   --   --  10 10   AST U/L  --   --   --   --   --  15 15   MAGNESIUM mg/dL 2.1 2.3 2.1 2.2  --   --   --    PHOSPHORUS mg/dL 6.2* 6.1* 6.0* 6.1*  --   --   --        CUTURES:  Lab Results   Component Value Date    BLOODCX No Growth After 5 Days. 11/26/2023    BLOODCX No Growth After 5 Days. 11/26/2023     "BLOODCX Staphylococcus coagulase negative (A) 07/15/2022    BLOODCX No Growth After 5 Days. 07/15/2022    BLOODCX No Growth After 5 Days. 03/31/2021    BLOODCX No Growth After 5 Days. 03/31/2021    BLOODCX No Growth After 5 Days. 11/13/2020    URINECX No Growth <1000 cfu/mL 12/10/2019                 Portions of the record may have been created with voice recognition software. Occasional wrong word or \"sound a like\" substitutions may have occurred due to the inherent limitations of voice recognition software. Read the chart carefully and recognize, using context, where substitutions have occurred.If you have any questions, please contact the dictating provider.    "

## 2024-01-09 NOTE — PLAN OF CARE
Problem: OCCUPATIONAL THERAPY ADULT  Goal: Performs self-care activities at highest level of function for planned discharge setting.  See evaluation for individualized goals.  Description: Treatment Interventions: ADL retraining, Functional transfer training, UE strengthening/ROM, Endurance training, Equipment evaluation/education, Compensatory technique education, Continued evaluation, Energy conservation, Activityengagement          See flowsheet documentation for full assessment, interventions and recommendations.   Note: Limitation: Decreased ADL status, Decreased Safe judgement during ADL, Decreased endurance, Decreased self-care trans, Decreased high-level ADLs  Prognosis: Fair  Assessment: Pt is 38 y.o. male admitted to [facility] on 1/5/2024 w/ subarachnoid hemorrhage. Pt received IR CEREBRAL ANGIOGRAPHY / INTERVENTION on 1/5/2023. 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), Esophagitis (07/21/2015), Falls, Gastroparesis, GERD (gastroesophageal reflux disease), History of shingles (2010), Hyperlipidemia, Hyperphosphatemia, Hypertension, Hypoglycemia (07/15/2022), Itching, Mastoiditis of right side (07/15/2022),  Orthostatic hypotension (10/25/2020), Peripheral polyneuropathy (11/20/2019), Retinopathy, Seizures ([facility]), Skin abnormality, Spontaneous bacterial peritonitis ([facility]) (10/19/2020), Squamous cell skin cancer, Stroke ([facility]), Swelling of both lower extremities, Traumatic onycholysis (07/21/2022), Vomiting, and Wears glasses.. Pt with active OT orders and activity orders. Pt resides in a Home, with 2 NIRU. Pt lives with mother and father. Pt was I w/ ADLs, received assistance with IADLs, (-) drove. Pt is currently functioning at min A for UB ADLs and mod A for LB ADLs. Pt is functioning at min Ax1 for bed mobility, transfers, and functional  mobility. Pt was able to demonstrate toilet transfer with mod Ax1. Pt is limited 2* pain, endurance, activity tolerance, functional mobility, balance, functional standing tolerance, decreased I w/ ADLS/IADLS, strength, and ROM. The Areas of Occupational Performance Areas to address w/ pt include: eating, grooming, bathing/shower, toilet hygiene, dressing, health maintenance, and functional mobility. Based off of an OT evaulation, assessment, and performance functioning, pt is identified as a high complexity.   The patient's raw score on the -PAC Daily Activity Inpatient Short Form is 18. A raw score of less than 19 suggests the patient may benefit from discharge to post-acute rehabilitation services. Please refer to the recommendation of the Occupational Therapist for safe discharge planning.However,  OT recommendation for d/c includes minimum resources III- outpatient therapy. Pt would benefit from continued acute OT services for  3-5x/week to  w/in 10-14 days:  to address functional goals.     Rehab Resource Intensity Level, OT: III (Minimum Resource Intensity) (outpatient OT)

## 2024-01-10 ENCOUNTER — APPOINTMENT (INPATIENT)
Dept: NON INVASIVE DIAGNOSTICS | Facility: HOSPITAL | Age: 39
DRG: 064 | End: 2024-01-10
Payer: MEDICARE

## 2024-01-10 ENCOUNTER — APPOINTMENT (INPATIENT)
Dept: DIALYSIS | Facility: HOSPITAL | Age: 39
DRG: 064 | End: 2024-01-10
Attending: INTERNAL MEDICINE
Payer: MEDICARE

## 2024-01-10 LAB
ANION GAP SERPL CALCULATED.3IONS-SCNC: 18 MMOL/L
BASOPHILS # BLD AUTO: 0 THOUSANDS/ÂΜL (ref 0–0.1)
BASOPHILS NFR BLD AUTO: 0 % (ref 0–1)
BUN SERPL-MCNC: 37 MG/DL (ref 5–25)
CALCIUM SERPL-MCNC: 8.6 MG/DL (ref 8.4–10.2)
CHLORIDE SERPL-SCNC: 92 MMOL/L (ref 96–108)
CO2 SERPL-SCNC: 23 MMOL/L (ref 21–32)
CREAT SERPL-MCNC: 8.44 MG/DL (ref 0.6–1.3)
EOSINOPHIL # BLD AUTO: 0.01 THOUSAND/ÂΜL (ref 0–0.61)
EOSINOPHIL NFR BLD AUTO: 0 % (ref 0–6)
ERYTHROCYTE [DISTWIDTH] IN BLOOD BY AUTOMATED COUNT: 13.7 % (ref 11.6–15.1)
GFR SERPL CREATININE-BSD FRML MDRD: 7 ML/MIN/1.73SQ M
GLUCOSE SERPL-MCNC: 152 MG/DL (ref 65–140)
GLUCOSE SERPL-MCNC: 231 MG/DL (ref 65–140)
GLUCOSE SERPL-MCNC: 234 MG/DL (ref 65–140)
GLUCOSE SERPL-MCNC: 239 MG/DL (ref 65–140)
GLUCOSE SERPL-MCNC: 260 MG/DL (ref 65–140)
HCT VFR BLD AUTO: 24.9 % (ref 36.5–49.3)
HGB BLD-MCNC: 8.2 G/DL (ref 12–17)
IMM GRANULOCYTES # BLD AUTO: 0.06 THOUSAND/UL (ref 0–0.2)
IMM GRANULOCYTES NFR BLD AUTO: 1 % (ref 0–2)
LYMPHOCYTES # BLD AUTO: 0.91 THOUSANDS/ÂΜL (ref 0.6–4.47)
LYMPHOCYTES NFR BLD AUTO: 14 % (ref 14–44)
MAGNESIUM SERPL-MCNC: 2.2 MG/DL (ref 1.9–2.7)
MCH RBC QN AUTO: 31.5 PG (ref 26.8–34.3)
MCHC RBC AUTO-ENTMCNC: 32.9 G/DL (ref 31.4–37.4)
MCV RBC AUTO: 96 FL (ref 82–98)
MONOCYTES # BLD AUTO: 0.59 THOUSAND/ÂΜL (ref 0.17–1.22)
MONOCYTES NFR BLD AUTO: 9 % (ref 4–12)
NEUTROPHILS # BLD AUTO: 4.92 THOUSANDS/ÂΜL (ref 1.85–7.62)
NEUTS SEG NFR BLD AUTO: 76 % (ref 43–75)
NRBC BLD AUTO-RTO: 0 /100 WBCS
PHOSPHATE SERPL-MCNC: 5.7 MG/DL (ref 2.7–4.5)
PLATELET # BLD AUTO: 192 THOUSANDS/UL (ref 149–390)
PMV BLD AUTO: 11.5 FL (ref 8.9–12.7)
POTASSIUM SERPL-SCNC: 4.4 MMOL/L (ref 3.5–5.3)
RBC # BLD AUTO: 2.6 MILLION/UL (ref 3.88–5.62)
SODIUM SERPL-SCNC: 133 MMOL/L (ref 135–147)
WBC # BLD AUTO: 6.49 THOUSAND/UL (ref 4.31–10.16)

## 2024-01-10 PROCEDURE — 97110 THERAPEUTIC EXERCISES: CPT

## 2024-01-10 PROCEDURE — 83735 ASSAY OF MAGNESIUM: CPT

## 2024-01-10 PROCEDURE — 82948 REAGENT STRIP/BLOOD GLUCOSE: CPT

## 2024-01-10 PROCEDURE — NC001 PR NO CHARGE: Performed by: PSYCHIATRY & NEUROLOGY

## 2024-01-10 PROCEDURE — 99291 CRITICAL CARE FIRST HOUR: CPT | Performed by: PSYCHIATRY & NEUROLOGY

## 2024-01-10 PROCEDURE — 93886 INTRACRANIAL COMPLETE STUDY: CPT

## 2024-01-10 PROCEDURE — 84100 ASSAY OF PHOSPHORUS: CPT

## 2024-01-10 PROCEDURE — 97116 GAIT TRAINING THERAPY: CPT

## 2024-01-10 PROCEDURE — 93886 INTRACRANIAL COMPLETE STUDY: CPT | Performed by: SURGERY

## 2024-01-10 PROCEDURE — 99232 SBSQ HOSP IP/OBS MODERATE 35: CPT | Performed by: INTERNAL MEDICINE

## 2024-01-10 PROCEDURE — 85025 COMPLETE CBC W/AUTO DIFF WBC: CPT | Performed by: INTERNAL MEDICINE

## 2024-01-10 PROCEDURE — 80048 BASIC METABOLIC PNL TOTAL CA: CPT | Performed by: INTERNAL MEDICINE

## 2024-01-10 PROCEDURE — 99233 SBSQ HOSP IP/OBS HIGH 50: CPT | Performed by: INTERNAL MEDICINE

## 2024-01-10 PROCEDURE — 99232 SBSQ HOSP IP/OBS MODERATE 35: CPT | Performed by: PHYSICIAN ASSISTANT

## 2024-01-10 PROCEDURE — 97530 THERAPEUTIC ACTIVITIES: CPT

## 2024-01-10 PROCEDURE — 97535 SELF CARE MNGMENT TRAINING: CPT

## 2024-01-10 RX ORDER — LABETALOL 200 MG/1
800 TABLET, FILM COATED ORAL 3 TIMES DAILY
Status: DISCONTINUED | OUTPATIENT
Start: 2024-01-10 | End: 2024-01-17

## 2024-01-10 RX ORDER — ACETAMINOPHEN 325 MG/1
975 TABLET ORAL EVERY 6 HOURS SCHEDULED
Qty: 36 TABLET | Refills: 0 | Status: DISPENSED | OUTPATIENT
Start: 2024-01-10 | End: 2024-01-13

## 2024-01-10 RX ADMIN — METOCLOPRAMIDE HYDROCHLORIDE 10 MG: 5 INJECTION INTRAMUSCULAR; INTRAVENOUS at 16:36

## 2024-01-10 RX ADMIN — Medication 10 MG: at 09:12

## 2024-01-10 RX ADMIN — NIMODIPINE 60 MG: 30 CAPSULE, LIQUID FILLED ORAL at 07:49

## 2024-01-10 RX ADMIN — LABETALOL HYDROCHLORIDE 400 MG: 200 TABLET, FILM COATED ORAL at 08:04

## 2024-01-10 RX ADMIN — ACETAMINOPHEN 975 MG: 325 TABLET, FILM COATED ORAL at 00:03

## 2024-01-10 RX ADMIN — CALCIUM ACETATE 667 MG: 667 CAPSULE ORAL at 12:23

## 2024-01-10 RX ADMIN — NIMODIPINE 60 MG: 30 CAPSULE, LIQUID FILLED ORAL at 16:07

## 2024-01-10 RX ADMIN — LISINOPRIL 40 MG: 20 TABLET ORAL at 08:04

## 2024-01-10 RX ADMIN — SENNOSIDES, DOCUSATE SODIUM 1 TABLET: 8.6; 5 TABLET ORAL at 17:42

## 2024-01-10 RX ADMIN — ACETAMINOPHEN 975 MG: 325 TABLET, FILM COATED ORAL at 06:00

## 2024-01-10 RX ADMIN — NIMODIPINE 60 MG: 30 CAPSULE, LIQUID FILLED ORAL at 00:02

## 2024-01-10 RX ADMIN — HEPARIN SODIUM 5000 UNITS: 5000 INJECTION INTRAVENOUS; SUBCUTANEOUS at 22:33

## 2024-01-10 RX ADMIN — HYDRALAZINE HYDROCHLORIDE 10 MG: 20 INJECTION, SOLUTION INTRAMUSCULAR; INTRAVENOUS at 03:51

## 2024-01-10 RX ADMIN — CHLORHEXIDINE GLUCONATE 15 ML: 1.2 SOLUTION ORAL at 20:17

## 2024-01-10 RX ADMIN — FAMOTIDINE 40 MG: 20 TABLET, FILM COATED ORAL at 08:04

## 2024-01-10 RX ADMIN — Medication 2.5 MG: at 09:20

## 2024-01-10 RX ADMIN — CALCIUM ACETATE 667 MG: 667 CAPSULE ORAL at 16:08

## 2024-01-10 RX ADMIN — NIMODIPINE 60 MG: 30 CAPSULE, LIQUID FILLED ORAL at 20:17

## 2024-01-10 RX ADMIN — HYDRALAZINE HYDROCHLORIDE 50 MG: 50 TABLET, FILM COATED ORAL at 08:04

## 2024-01-10 RX ADMIN — GABAPENTIN 300 MG: 300 CAPSULE ORAL at 13:44

## 2024-01-10 RX ADMIN — NIMODIPINE 60 MG: 30 CAPSULE, LIQUID FILLED ORAL at 12:23

## 2024-01-10 RX ADMIN — ESCITALOPRAM OXALATE 20 MG: 20 TABLET ORAL at 08:04

## 2024-01-10 RX ADMIN — ATORVASTATIN CALCIUM 40 MG: 40 TABLET, FILM COATED ORAL at 17:42

## 2024-01-10 RX ADMIN — HYDRALAZINE HYDROCHLORIDE 10 MG: 20 INJECTION, SOLUTION INTRAMUSCULAR; INTRAVENOUS at 22:36

## 2024-01-10 RX ADMIN — HEPARIN SODIUM 5000 UNITS: 5000 INJECTION INTRAVENOUS; SUBCUTANEOUS at 05:45

## 2024-01-10 RX ADMIN — CALCIUM ACETATE 667 MG: 667 CAPSULE ORAL at 07:49

## 2024-01-10 RX ADMIN — SENNOSIDES, DOCUSATE SODIUM 1 TABLET: 8.6; 5 TABLET ORAL at 08:04

## 2024-01-10 RX ADMIN — NIMODIPINE 60 MG: 30 CAPSULE, LIQUID FILLED ORAL at 03:51

## 2024-01-10 RX ADMIN — CINACALCET 60 MG: 30 TABLET ORAL at 08:04

## 2024-01-10 RX ADMIN — ACETAMINOPHEN 975 MG: 325 TABLET, FILM COATED ORAL at 12:23

## 2024-01-10 RX ADMIN — HEPARIN SODIUM 5000 UNITS: 5000 INJECTION INTRAVENOUS; SUBCUTANEOUS at 13:44

## 2024-01-10 RX ADMIN — HYDRALAZINE HYDROCHLORIDE 50 MG: 50 TABLET, FILM COATED ORAL at 20:17

## 2024-01-10 RX ADMIN — LABETALOL HYDROCHLORIDE 800 MG: 200 TABLET, FILM COATED ORAL at 20:17

## 2024-01-10 RX ADMIN — Medication 2.5 MG: at 17:42

## 2024-01-10 RX ADMIN — HYDRALAZINE HYDROCHLORIDE 50 MG: 50 TABLET, FILM COATED ORAL at 16:07

## 2024-01-10 RX ADMIN — NIFEDIPINE 90 MG: 30 TABLET, FILM COATED, EXTENDED RELEASE ORAL at 22:33

## 2024-01-10 RX ADMIN — CHLORHEXIDINE GLUCONATE 15 ML: 1.2 SOLUTION ORAL at 08:04

## 2024-01-10 RX ADMIN — LABETALOL HYDROCHLORIDE 800 MG: 200 TABLET, FILM COATED ORAL at 16:07

## 2024-01-10 RX ADMIN — POLYETHYLENE GLYCOL 3350 17 G: 17 POWDER, FOR SOLUTION ORAL at 08:05

## 2024-01-10 RX ADMIN — Medication 10 MG: at 00:02

## 2024-01-10 RX ADMIN — HYDRALAZINE HYDROCHLORIDE 10 MG: 20 INJECTION, SOLUTION INTRAMUSCULAR; INTRAVENOUS at 09:58

## 2024-01-10 RX ADMIN — LISINOPRIL 40 MG: 20 TABLET ORAL at 17:42

## 2024-01-10 NOTE — PROGRESS NOTES
Progress Note - Nephrology   Mateus CLOTILDE Mckeon 38 y.o. male MRN: [id number]  Unit/Bed#: ICU 10 Encounter: [id number]      Assessment / Plan:  [place] [facility] [place]-plan for hemodialysis today in the setting of MRI this morning to reduce risk of NSF although risk is less with class II gadolinium agents  -Because of end-stage renal disease due to diabetic nephropathy, previously on PD but changed to HD December 2020  -Plan for 3 consecutive days of dialysis status post MRI 1/9, avoiding aggressive UF and plan for even HD again today.  Higher dialysate sodium bath at 142 from goal serum sodium 138 per discussion with neurosurgery in the setting of subarachnoid hemorrhage.  Access-left upper extremity AV fistula well-functioning  Subarachnoid hemorrhage-management per ICU and neurosurgical teams, goal serum sodium 138 in light of concerns for vasospasm.  Hypertension-does have history of hypertensive emergency in the past, goal blood pressure systolic less than 160 per neurosurgery, off Cardene drip, continue clonidine 0.3 mg weekly patch, hydralazine 50 mg p.o. 3 times daily, labetalol 800 mg p.o. 3 times daily, lisinopril 40 mg p.o. twice daily, Nimotop 60 mg p.o. every 4hr, nifedipine 90 mg p.o. daily at bedtime as well as as needed hydralazine  -Of note, labetalol dose has been doubled in the last 24 hours.  Hyperphosphatemia-continue PhosLo 1 tab 3 times daily with meals, monitor Phos outpatient, on Velphoro as an outpatient, Phos 6.2-->5.7 today  Secondary hyperparathyroidism in origin-continue Sensipar 60 mg daily, monitor PTH outpatient.  May hold Sensipar if nausea persists  Diabetes mellitus type 2 in the setting of ESRD-management per primary team  History of hyperkalemia-on Veltassa 5 mg on nondialysis days as an outpatient, potassium stable  Nonelevated anion gap metabolic acidosis-bicarbonate 23, resolved with HD, monitor BMP  Anemia of CKD-hemoglobin at goal, hemoglobin  "8.2 today, monitor CBC    Patient to have dialysis this afternoon.  Would avoid checking afternoon sodium in light of this as it will be inaccurate.  Follow-up a.m. labs.    Subjective:   He denies chest pain, shortness of breath or headache.      Objective:     Vitals: Blood pressure 164/84, pulse 72, temperature 98.4 °F (36.9 °C), temperature source Oral, resp. rate 12, height 5' 5\" (1.651 m), weight 96.8 kg (213 lb 6.5 oz), SpO2 99%.,Body mass index is 35.51 kg/m².Temp (24hrs), Av.5 °F (36.9 °C), Min:98.3 °F (36.8 °C), Max:98.9 °F (37.2 °C)      Weight (last 2 days)       Date/Time Weight    24 1512 96.8 (213.41)    Comment rows:    OBSERV: Pt assessed pre-HDTX at 24 1512              Intake/Output Summary (Last 24 hours) at 1/10/2024 1302  Last data filed at 1/10/2024 0835  Gross per 24 hour   Intake 209.58 ml   Output --   Net 209.58 ml     I/O last 24 hours:  In: 617.9 [P.O.:180; I.V.:437.9]  Out: 500 [Other:500]        Physical Exam:   Physical Exam  Vitals reviewed.   Constitutional:       General: He is not in acute distress.     Appearance: Normal appearance. He is well-developed. He is not diaphoretic.   HENT:      Head: Normocephalic and atraumatic.      Nose: Nose normal.      Mouth/Throat:      Mouth: Mucous membranes are moist.      Pharynx: No oropharyngeal exudate.   Eyes:      General: No scleral icterus.        Right eye: No discharge.         Left eye: No discharge.   Neck:      Thyroid: No thyromegaly.   Cardiovascular:      Rate and Rhythm: Normal rate and regular rhythm.      Heart sounds: Normal heart sounds.   Pulmonary:      Effort: Pulmonary effort is normal.      Breath sounds: Normal breath sounds. No wheezing or rales.   Abdominal:      General: Bowel sounds are normal. There is no distension.      Palpations: Abdomen is soft.      Tenderness: There is no abdominal tenderness.   Musculoskeletal:         General: Swelling (trace ankle edema b/l) present.      Cervical " back: Neck supple.   Lymphadenopathy:      Cervical: No cervical adenopathy.   Skin:     General: Skin is warm and dry.      Findings: No rash.   Neurological:      General: No focal deficit present.      Mental Status: He is alert.      Comments: awake   Psychiatric:         Mood and Affect: Mood normal.         Behavior: Behavior normal.         Invasive Devices       Peripheral Intravenous Line  Duration             Peripheral IV 01/08/24 Right;Upper;Ventral (anterior) Arm 1 day    Peripheral IV 01/09/24 Dorsal (posterior);Right Hand 1 day              Line  Duration             Hemodialysis Access 11/02/20 Left Forearm 1164 days    Hemodialysis AV Fistula 11/09/20 Left Other (Comment) 1157 days                    Medications:    Scheduled Meds:  Current Facility-Administered Medications   Medication Dose Route Frequency Provider Last Rate    acetaminophen  975 mg Oral Q6H HALIE Dempsey MD      atorvastatin  40 mg Oral QPM Juan Woodruff MD      bisacodyl  10 mg Rectal Daily PRN DEISI Tony      calcium acetate  667 mg Oral TID With Meals Juan Woodruff MD      chlorhexidine  15 mL Mouth/Throat Q12H DEISI Rodrigues      cinacalcet  60 mg Oral Daily Tyler Anand, DO      cloNIDine  0.3 mg Transdermal Weekly Andressa Marcos MD      escitalopram  20 mg Oral Daily Juan Woodruff MD      famotidine  40 mg Oral Daily Juan Woodruff MD      gabapentin  300 mg Oral Daily Hansel Schultz DO      heparin (porcine)  5,000 Units Subcutaneous Q8H HALIE Dempsey MD      hydrALAZINE  10 mg Intravenous Q4H PRN DEISI Casas      hydrALAZINE  50 mg Oral TID Cinthia Dempsey MD      HYDROmorphone  0.2 mg Intravenous Q4H PRN Cinthia Dempsey MD      insulin lispro  300 Units Subcutaneous Insulin Pump Daily PRN Rosemary Rocha DO      labetalol  10 mg Intravenous Q6H PRN Hansel Schultz DO      labetalol  800 mg Oral TID Radha Steen MD      lisinopril  40 mg Oral BID Hansel Schultz,  DO      metoclopramide  10 mg Intravenous Q6H PRN DEISI Egan      NIFEdipine  90 mg Oral HS Juan Woodruff MD      niMODipine  60 mg Oral Q4H [facility] Andressa Marcos MD      ondansetron  4 mg Intravenous Q4H PRN DEISI Egan      oxyCODONE  2.5 mg Oral Q4H PRN Hansel Schultz DO      Or    oxyCODONE  5 mg Oral Q4H PRN Hansel Schultz DO      patient maintained insulin pump  1 each Subcutaneous Q8H Rosemary Rocha DO      polyethylene glycol  17 g Oral Daily Radha Steen MD      prochlorperazine  5 mg Oral Q6H PRN Hansel Schultz DO      senna-docusate sodium  1 tablet Oral BID Cinthia Dempsey MD         PRN Meds:.  bisacodyl    hydrALAZINE    HYDROmorphone    insulin lispro    labetalol    metoclopramide    ondansetron    oxyCODONE **OR** oxyCODONE    prochlorperazine    Continuous Infusions:         LAB RESULTS:      Results from last 7 days   Lab Units 01/10/24  0557 01/09/24  0436 01/08/24  0654 01/07/24  0541 01/06/24  0527 01/05/24  0548 01/04/24  2243   WBC Thousand/uL 6.49 10.48* 13.04* 9.89 10.25*  --  8.46   HEMOGLOBIN g/dL 8.2* 9.4* 10.3* 9.1* 9.6*  --  11.5*   HEMATOCRIT % 24.9* 27.9* 30.1* 27.8* 29.2*  --  34.3*   PLATELETS Thousands/uL 192 220 217 195 208  --  210   NEUTROS PCT % 76* 83* 80* 71 82*  --  71   LYMPHS PCT % 14 9* 9* 17 10*  --  17   MONOS PCT % 9 6 6 8 7  --  7   EOS PCT % 0 0 3 2 0  --  4   POTASSIUM mmol/L 4.4 4.3 5.2 4.2 4.9 4.7 5.3   CHLORIDE mmol/L 92* 94* 93* 97 95*  --  91*   CO2 mmol/L 23 25 19* 25 26  --  27   BUN mg/dL 37* 41* 62* 45* 38*  --  56*   CREATININE mg/dL 8.44* 9.84* 14.28* 12.62* 10.27*  --  13.49*   CALCIUM mg/dL 8.6 8.5 8.3* 7.8* 9.1  --  9.0   ALK PHOS U/L  --   --   --   --   --   --  79   ALT U/L  --   --   --   --   --   --  10   AST U/L  --   --   --   --   --   --  15   MAGNESIUM mg/dL 2.2 2.1 2.3 2.1 2.2  --   --    PHOSPHORUS mg/dL 5.7* 6.2* 6.1* 6.0* 6.1*  --   --        CUTURES:  Lab Results   Component Value  "Date    BLOODCX No Growth After 5 Days. 11/26/2023    BLOODCX No Growth After 5 Days. 11/26/2023    BLOODCX Staphylococcus coagulase negative (A) 07/15/2022    BLOODCX No Growth After 5 Days. 07/15/2022    BLOODCX No Growth After 5 Days. 03/31/2021    BLOODCX No Growth After 5 Days. 03/31/2021    BLOODCX No Growth After 5 Days. 11/13/2020    URINECX No Growth <1000 cfu/mL 12/10/2019                 Portions of the record may have been created with voice recognition software. Occasional wrong word or \"sound a like\" substitutions may have occurred due to the inherent limitations of voice recognition software. Read the chart carefully and recognize, using context, where substitutions have occurred.If you have any questions, please contact the dictating provider.    "

## 2024-01-10 NOTE — ASSESSMENT & PLAN NOTE
Lab Results   Component Value Date    EGFR 7 01/10/2024    EGFR 5 01/09/2024    EGFR 3 01/08/2024    CREATININE 8.44 (H) 01/10/2024    CREATININE 9.84 (H) 01/09/2024    CREATININE 14.28 (H) 01/08/2024     On HD (MWF)

## 2024-01-10 NOTE — PROGRESS NOTES
"Endocrinoloy Progress Note   Mateus Mckeon 38 y.o. male MRN: [id number]  Unit/Bed#: ICU 10 Encounter: [id number]      CC: diabetes f/u    Subjective:   Mateus Mckeon is a 38 y.o. year old male with longstanding type 1 diabetes on OmniPod insulin pump therapy with multiple complications including ESRD on HD, gastroparesis, CVAs, retinopathy, neuropathy, who presented with headache and was found with a subarachnoid hemorrhage.Remains in ICU on vasospasm watch.  Angiogram versus CTA planned for Friday 1/12.    Patient has been self managing his OmniPod insulin pump.  Last changed Dexcom yesterday 1/9 (every 10 day changes).  Last changed OmniPod 2 days ago 1/8 (every 3 day changes)    Patient did get 10 mg of Decadron IV yesterday due to headache.  Consequently blood sugars are running somewhat higher.  Patient reports he is had to do a couple extra correctional boluses.    Seen on dialysis.Feels well.  No complaints, just that the light bothers his eyes which is why he is laying in bed with an ice pack.  No hypoglycemia.    Objective:     Vitals: Blood pressure (!) 186/82, pulse 68, temperature 98.9 °F (37.2 °C), temperature source Oral, resp. rate 17, height 5' 5\" (1.651 m), weight 96.8 kg (213 lb 6.5 oz), SpO2 100%.,Body mass index is 35.51 kg/m².      Intake/Output Summary (Last 24 hours) at 1/10/2024 1624  Last data filed at 1/10/2024 1600  Gross per 24 hour   Intake 620 ml   Output --   Net 620 ml       Physical Exam:  General Appearance: awake, appears stated age and cooperative. Ice bag on face  Extremities: moves all extremities  Skin: Skin color and temperature normal.   Pulm: no labored breathing    Lab, Imaging and other studies: I have personally reviewed pertinent reports.      POC Glucose (mg/dl)   Date Value   01/10/2024 260 (H)   01/10/2024 239 (H)   01/10/2024 231 (H)   01/09/2024 172 (H)   01/09/2024 176 (H)   01/08/2024 189 (H)   01/08/2024 164 (H)   01/08/2024 205 (H)   01/08/2024 236 " "(H)   01/08/2024 102       Assessment and plan:  -Diabetes Mellitus type I   -Poor controlled based on f October fructosamine of 430   -Recent A1c 6.0 however this is unreliable in ESRD with chronic anemia   - OmniPod 5 with Dexcom G6 via phone carrol since March 2023. Runs in auto mode, so algorithm adjusts insulin administration based off blood sugars.   Programed settings  Basal rate:               MN-6A: 0.5u              6A-MN: 0.65u  Insulin to carb ratio:14  Insulin sensitivity factor:50  BG target:120  AIT: 4h  Type of insulin:Novolog    -- Patient may remain on his insulin pump and his continuous glucose monitor and self manage his own blood glucose monitoring and insulin administration  --Do anticipate increased hyperglycemia for the next few days due to Decadron on 1/9.  --Replace pump every 3 days, replace sensor every 10 days, with own supplies  --Patient will fill new insulin pump every 3 days with hospital provided insulin, 300U, as needed  --Should patient become incapacitated, lose vision, lose ability to manage his own pump, would anticipate transition to either insulin infusion or subcutaneous basal bolus regimen.  Patient and family aware.  --Hypoglycemia protocol  --Will continue to follow and make adjustments as appropriate    Rosemary Rocha  Endocrinology PGY-4    Please Tigertext questions to the physician covering the \"[employer]\" Role. Thank you.  "

## 2024-01-10 NOTE — ASSESSMENT & PLAN NOTE
S/p Cerebral angiography on 1/5/24 by Dr. Mittal-  angio negative.   Right basilar cistern SAH, BD 11, HH 2, MF 3.  P/w HA that started on 12/31/23, 5 days prior to admission on 1/4/24.   Hx ASA use for hx multiple strokes, reversed with DDAVP.  MRIs negative for source of SAH, small scattered acute/subacute infarcts noted.   Pt on hemodialysis for CKD.   On current exam, c/o headache, otherwise non-focal. GCS 15.     Imaging:  CT head wo 1/9/24: Improving subarachnoid hemorrhage compared to recent prior studies. No new findings.   MRI brain wo 1/8/24:Stable hematoma in the right CP angle, prepontine and premedullary cisterns. New subarachnoid hemorrhage over the convexities and within the lateral ventricles likely due to redistribution.Few tiny scattered recent infarcts in multiple vascular distributions.  MRI cervical spine wo 1/8/24:No identifiable etiology for subarachnoid hemorrhage.Mild congenital canal stenosis.New marrow edema at C5-C6 with severe disc space narrowing that is similar to recent CT. Modic type I endplate degenerative changes favored over infection but recommend clinical correlation and follow-up imaging if warranted.    Plan:  Continue to monitor neuro exam closely.  STAT CTA with decline in GCS > 2 pts in 1 hour.  Maintain SBP < 160 mmHg.  Pt with hx of strokes. Home ASA on hold due to acute SAH. Continue to hold ASA at this time.   Will maintain on SAH pathway at this time.   Spasm watch  Daily TCDs: R 1.254, L 2.3  Continue Nimodipine 60mg Q4H  Normonatremia (today 133), euvolemia (-62 ml/24H), Hgb >8 (today 8.2)   SBP<160 mmHg  Mobilize as tolerated.  DVT ppx: SCD's,  SQH.  Tentative plan for repeat Cerebral angiography on Friday 1/12/24 by Dr. Mittal.     Neurosurgery will continue to follow. Call with questions.

## 2024-01-10 NOTE — PROGRESS NOTES
[facility]  Progress Note: Critical Care  Name: Mateus Mckeon 38 y.o. male I MRN: [id number]  Unit/Bed#: ICU 10 I Date of Admission: 1/5/2024   Date of Service: 1/10/2024 I Hospital Day: 5    Assessment/Plan   Neuro/Psych/ENT: SAH / Headache due to SAH  Plan: Frequent neuro checks. CAM-ICU. Delirium precautions. Frequent re-orientation. Regulate sleep/wake cycle. Seizure precautions. Spasm watch with daily TCDs, q4 neuro checks, SBP < 160. Mastoiditis seen on MRI on 1/8/2024.     Nimodipine 60 mg q4 x 21 days  ENT recs: incidental finding, no signs of infection, no acute ENT interventions at this time  Neurosurgery recs: CTA on Friday     Analgesia: Multimodal.  Tylenol 975 mg q6 PO  Lexapro 20 mg daily  Gabapentin 300 mg daily (after dialysis on dialysis days)  Oxycodone IR 2.5 or 5 mg q4 PRN  Dilaudid 0.2 q4 IV PRN        CV: HTN / HLD  Plan: Continuous cardiopulmonary monitoring. Maintain MAP >65. Monitor resuscitative endpoints. Goal BP < 160.  Labetalol 400 TID  Hydralazine 50 mg TID  Lisinopril 40 mg BID  Nifedipine 90 mg QHS  Nimodipine 60 mg q4  Atorvastatin 40 mg daily  PRN: hydralazine 10 mg q4 IV PRN, labetalol 10 mg q6 IV PRN  CPAP ordered, not attempted last night     Pulm: No acute issues  Plan: Continuous pulse oximetry. Incentive spirometry when appropriate. Pulmonary toilet. Elevate HOB. Maintain O2 sat >90%. Aspiration precautions.      GI: GERD / nausea  Plan: Bowel regimen: Dulcolax prn. Monitor stool output and quality.  GI prophylaxis: Famotidine   Zofran 4 mg q4 IV prn  Reglan 10 mg q6 IV prn  Compazine 5 mg q6 PO prn     /Renal: ESRD   Plan: Strict I/O. Monitor urine output and renal indices. Avoid nephrotoxins.   PhosLo  Cinacalcet  HD schedule per nephrology - hemodialysis today  Sodium 2 mg after dialysis  Consult: Nephrology consulted and recommendations appreciated, with goal of sodium of 138.     F/E/N: No acute issues  Plan:   F: none  continuous. Fluid resuscitation prn.  E: Monitor and replete electrolytes for ionized calcium >1.12, Mg >2, Phos >3, K >4.  N: Renal diet with Ensure     Endo: DM1 / secondary hyperparthyroidism  Insulin: home insulin pump  Steroids: not indicated  Plan: Monitor blood glucose for goal 140-180.   Consult: Endocrinology consulted and recommendations appreciated                Heme: No acute issues  Plan: Monitor Hgb and transfuse for Hgb <7 or based on hemodynamics if actively bleeding. Monitor platelets.  VTE prophylaxis: Heparin 5000 U BID. Sequential compression devices and/or foot pumps.     ID: No acute issues  Plan: Monitor fever curve and WBC. Maintain normothermia. Daily CHG bath, Peridex oral rinse, and nasal antiseptic while in ICU.     MSK/Skin: No acute issues  Plan: Frequent turning and repositioning. Pressure injury prevention. Monitor for skin breakdown. PT/OT when appropriate. Encourage OOBTC and ambulation when appropriate. Local wound care prn.       Disposition: Stepdown Level 2    ICU Core Measures     A: Assess, Prevent, and Manage Pain Has pain been assessed? Yes  Need for changes to pain regimen? No   B: Both SAT/SAT  N/A   C: Choice of Sedation RASS Goal: 0 Alert and Calm  Need for changes to sedation or analgesia regimen? No   D: Delirium CAM-ICU: Negative   E: Early Mobility  Plan for early mobility? Yes   F: Family Engagement Plan for family engagement today? Yes         Prophylaxis:  VTE VTE covered by:  heparin (porcine), Subcutaneous, 5,000 Units at 01/10/24 0545       Stress Ulcer  covered byfamotidine (PEPCID) 40 MG tablet [[id number]] (Long-Term Med), famotidine (PEPCID) tablet 40 mg [[id number]]        Significant 24hr Events     24hr events: NAEO.     Subjective   Patient was seen and assessed at bedside. Patient was sitting in bedside chair upon evaluation. Patient took PRN Hydralazine and labetalol O/N. Patient had 4/10 headache and took oxy 2.5 and dilaudid. Patient's HA is at 3/10  today. He states that he still has nausea but mostly when ambulating. He states that he has continued weakness and would like to work with physical therapy, albeit 2 hours after hemodialysis today. Patient had additional bowel movement yesterday. Patient's appetite is increased today and he is willing to attempt breakfast.    Review of Systems   Constitutional:  Negative for activity change, appetite change and fever.   Respiratory:  Negative for chest tightness and shortness of breath.    Cardiovascular:  Negative for chest pain and palpitations.   Gastrointestinal:  Positive for nausea. Negative for abdominal pain, constipation, diarrhea and vomiting.   Neurological:  Positive for weakness (after dialysis and upon ambulating).   Psychiatric/Behavioral:  The patient is not nervous/anxious.         Objective                            Vitals I/O      Most Recent Min/Max in 24hrs   Temp 98.4 °F (36.9 °C) Temp  Min: 98.2 °F (36.8 °C)  Max: 98.9 °F (37.2 °C)   Pulse 70 Pulse  Min: 64  Max: 76   Resp (!) 10 Resp  Min: 7  Max: 29   /70 BP  Min: 99/58  Max: 198/102   O2 Sat 99 % SpO2  Min: 97 %  Max: 100 %      Intake/Output Summary (Last 24 hours) at 1/10/2024 0618  Last data filed at 1/9/2024 1600  Gross per 24 hour   Intake 437.91 ml   Output 500 ml   Net -62.09 ml       Diet Renal; Renal [place]; No; No    Invasive Monitoring           Physical Exam   Physical Exam  Eyes:      General: No dysconjugate gaze, no foreign body in eye and neglectNo visual field deficit or scleral icterus.        Right eye: No discharge.         Left eye: No discharge.      Extraocular Movements: Extraocular movements intact.      Conjunctiva/sclera: Conjunctivae normal.      Pupils: Pupils are equal, round, and reactive to light.   Skin:     General: Skin is warm.   HENT:      Head: Normocephalic.      Right Ear: Hearing normal.      Left Ear: Hearing normal.      Mouth/Throat:      Mouth: Mucous membranes are moist.    Cardiovascular:      Rate and Rhythm: Normal rate and regular rhythm.      Pulses: Normal pulses.      Heart sounds: Normal heart sounds.   Musculoskeletal:         General: Normal range of motion.      Right lower leg: No edema.      Left lower leg: No edema.   Abdominal: General: There is no distension.      Palpations: Abdomen is soft.      Tenderness: There is no abdominal tenderness. There is no guarding.      Hernia: No hernia is present.   Constitutional:       General: He is not in acute distress.     Appearance: He is well-developed and well-nourished. He is not ill-appearing.   Pulmonary:      Effort: Pulmonary effort is normal. No accessory muscle usage, respiratory distress or accessory muscle usage.      Breath sounds: Normal breath sounds. No stridor. No wheezing, rhonchi or rales.   Secretions are normal.Chest:      Chest wall: No tenderness.   Psychiatric:         Mood and Affect: Mood and affect normal.         Speech: Speech is not no receptive aphasia or no expressive aphasia.   Neurological:      General: No focal deficit present.      Mental Status: He is alert and oriented to person, place and time. Mental status is at baseline. He is calm.      Cranial Nerves: No dysarthria or facial asymmetry.      Sensory: Sensation is intact.      Motor: gross motor function is at baseline for patient. Strength full and intact in all extremities.            Diagnostic Studies      EKG:  No new EKG.  Imagin/9-Improving subarachnoid hemorrhage compared to recent prior studies. No new findings. Other stable and nonemergent findings above.I have personally reviewed pertinent reports.   and I have personally reviewed pertinent films in PACS     Medications:  Scheduled PRN   acetaminophen, 975 mg, Q6H HALIE  atorvastatin, 40 mg, QPM  calcium acetate, 667 mg, TID With Meals  chlorhexidine, 15 mL, Q12H HALIE  cinacalcet, 60 mg, Daily  cloNIDine, 0.3 mg, Weekly  escitalopram, 20 mg, Daily  famotidine, 40 mg,  Daily  gabapentin, 300 mg, Daily  heparin (porcine), 5,000 Units, Q8H HALIE  hydrALAZINE, 50 mg, TID  labetalol, 400 mg, TID  lisinopril, 40 mg, BID  NIFEdipine, 90 mg, HS  niMODipine, 60 mg, Q4H HALIE  patient maintained insulin pump, 1 each, Q8H  polyethylene glycol, 17 g, Daily  senna-docusate sodium, 1 tablet, BID      bisacodyl, 10 mg, Daily PRN  hydrALAZINE, 10 mg, Q4H PRN  HYDROmorphone, 0.2 mg, Q4H PRN  insulin lispro, 300 Units, Daily PRN  labetalol, 10 mg, Q6H PRN  metoclopramide, 10 mg, Q6H PRN  ondansetron, 4 mg, Q4H PRN  oxyCODONE, 2.5 mg, Q4H PRN   Or  oxyCODONE, 5 mg, Q4H PRN  prochlorperazine, 5 mg, Q6H PRN       Continuous          Labs:    CBC    Recent Labs     01/08/24  0654 01/09/24  0436   WBC 13.04* 10.48*   HGB 10.3* 9.4*   HCT 30.1* 27.9*    220     BMP    Recent Labs     01/08/24  0654 01/09/24  0436   SODIUM 135 134*   K 5.2 4.3   CL 93* 94*   CO2 19* 25   AGAP 23 15   BUN 62* 41*   CREATININE 14.28* 9.84*   CALCIUM 8.3* 8.5       Coags    No recent results     Additional Electrolytes  Recent Labs     01/08/24  0654 01/09/24  0436   MG 2.3 2.1   PHOS 6.1* 6.2*          Blood Gas    No recent results  No recent results LFTs  No recent results    Infectious  No recent results  Glucose  Recent Labs     01/08/24  0654 01/09/24  0436   GLUC 136 180*               Non-Critical Care Time Statement: I have spent a total time of 60 minutes in caring for this patient including Diagnostic results, Prognosis, Risks and benefits of tx options, Instructions for management, Patient and family education, Importance of tx compliance, Risk factor reductions, Impressions, Counseling / Coordination of care, Documenting in the medical record, Reviewing / ordering tests, medicine, procedures  , Obtaining or reviewing history  , and Communicating with other healthcare professionals .     Mercy Arita

## 2024-01-10 NOTE — PLAN OF CARE
Target UF Goal  0  L as tolerated. Patient dialyzing for 3 hours on 3 K bath for serum K of  4.4  per protocol. Treatment plan reviewed with Dr. Gaston via tiger text  Problem: METABOLIC, FLUID AND ELECTROLYTES - ADULT  Goal: Electrolytes maintained within normal limits  Description: INTERVENTIONS:  - Monitor labs and assess patient for signs and symptoms of electrolyte imbalances  - Administer electrolyte replacement as ordered  - Monitor response to electrolyte replacements, including repeat lab results as appropriate  - Instruct patient on fluid and nutrition as appropriate  Outcome: Progressing  Goal: Fluid balance maintained  Description: INTERVENTIONS:  - Monitor labs   - Monitor I/O and WT  - Instruct patient on fluid and nutrition as appropriate  - Assess for signs & symptoms of volume excess or deficit  Outcome: Progressing   Post-Dialysis RN Treatment Note    Blood Pressure:  Pre 156/77 mm/Hg  Post 154/70 mmHg   EDW  93 kg    Weight:  Pre 76.5 kg   Post Not Applicable kg   Mode of weight measurement: Bed Scale deffered   Volume Removed  0 ml    Treatment duration 180 minutes    NS given  No    Treatment shortened? No   Medications given during Rx None Reported   Estimated Kt/V  None Reported   Access type: AV fistula   Access Issues: No    Report called to primary nurse   Yes  Miguel White RN

## 2024-01-10 NOTE — PHYSICAL THERAPY NOTE
PHYSICAL THERAPY TREATMENT  NAME:  Mateus Mckeon  DATE: 01/10/24    AGE:   38 y.o.  Mrn:   [id number]  ADMIT DX:  Brain bleed (HCC) [I61.9]    Past Medical History:   Diagnosis Date    Acute kidney injury (HCC)     Ambulates with cane     Anuria     Anxiety     Cellulitis of right elbow 03/31/2021    Chronic kidney disease     Depression     Diabetes mellitus (HCC)     Diarrhea     Emesis 10/24/2020    End stage renal disease (HCC) 02/11/2018    Formatting of this note might be different from the original. Last Assessment & Plan:  Secondary to DM.  On nightly PD.  Followed by [facility].  Patient considering transplant for kidney and pancreas through [facility] Formatting of this note might be different from the original. Last Assessment & Plan:  Formatting of this note might be different from the original. 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    Esophagitis 07/21/2015    Falls     Gastroparesis     GERD (gastroesophageal reflux disease)     History of shingles 2010    History of transfusion 02/2018    no adverse reaction    Hyperlipidemia     Hyperphosphatemia     Hypertension     Hypoglycemia 07/15/2022    Itching     Mastoiditis of right side 07/15/2022    Muscle weakness     general unsteadiness    Obesity (BMI 30.0-34.9) 09/09/2019    Orthostatic hypotension 10/25/2020    Peripheral polyneuropathy 11/20/2019    PONV (postoperative nausea and vomiting) 01/26/2018    Protein-calorie malnutrition (HCC) 11/23/2020    Recurrent peritonitis (HCC) due to peritoneal dialysis catheter 07/31/2020    Retinopathy     Seizures ([facility])     early 2020 - one time    Skin abnormality     some dime size areas where skin was scratched from itching    Spontaneous bacterial peritonitis (HCC) 10/19/2020    Squamous cell skin cancer     left temple    Stroke (HCC)     x2 - off balance/no driving/fatigue    Swelling of both lower extremities     Traumatic onycholysis 07/21/2022    Vomiting      Wears glasses      Past Surgical History:   Procedure Laterality Date    CARDIAC ELECTROPHYSIOLOGY PROCEDURE N/A 9/21/2023    Procedure: Cardiac loop recorder explant;  Surgeon: Parish Morgan MD;  [facility]: [facility];  Service: Cardiology    CARDIAC LOOP RECORDER  05/2018    COLONOSCOPY      EGD      EYE SURGERY Right     IR AV FISTULAGRAM/GRAFTOGRAM  02/23/2021    IR CEREBRAL ANGIOGRAPHY / INTERVENTION  1/5/2024    IR TUNNELED CENTRAL LINE PLACEMENT  02/16/2021    IR TUNNELED DIALYSIS CATHETER PLACEMENT  11/18/2020    IR TUNNELED DIALYSIS CATHETER REMOVAL  02/12/2021    IR TUNNELED DIALYSIS CATHETER REMOVAL  03/11/2021    MOHS SURGERY Left 12/14/2022    Left temple with Dr. Hassan    PERITONEAL CATHETER INSERTION N/A 08/27/2018    Procedure: UNROOF PD CATHETER;  Surgeon: Felipe Lindo DO;  Location: [facility];  Service: General    WA ARTERIOVENOUS ANASTOMOSIS OPEN DIRECT Left 11/09/2020    Procedure: CREATION FISTULA  ARTERIOVENOUS (AV) - LEFT WRIST;  Surgeon: Placido Altamirano MD;  Location: [facility];  Service: Vascular    WA ESOPHAGOGASTRODUODENOSCOPY TRANSORAL DIAGNOSTIC N/A 04/18/2019    Procedure: ESOPHAGOGASTRODUODENOSCOPY (EGD);  Surgeon: Ale Figueroa MD;  Location: [facility];  Service: Gastroenterology    WA LAPS INSERTION TUNNELED INTRAPERITONEAL CATHETER N/A 08/06/2018    Procedure: LAPAROSCOPIC PD CATHETER PLACEMENT;  Surgeon: Felipe Lindo DO;  Location: [facility];  Service: General    WA REMOVAL TUNNELED INTRAPERITONEAL CATHETER N/A 11/18/2020    Procedure: REMOVAL CATHETER PERITONEAL DIALYSIS;  Surgeon: Abdifatah Ty MD;  [facility];  Service: General    TONSILLECTOMY      UPPER GASTROINTESTINAL ENDOSCOPY         Length Of Stay: 5     01/10/24 0942   PT Last Visit   PT Visit Date 01/10/24   Note Type   Note Type Treatment   Pain Assessment   Pain Assessment Tool 0-10   Pain Score 3   Pain Location/Orientation Location: Head   Pain Radiating Towards non   Pain  "Onset/Description Descriptor: Headache   Patient's Stated Pain Goal No pain   Hospital Pain Intervention(s) Repositioned;Ambulation/increased activity;Emotional support   Restrictions/Precautions   Weight Bearing Precautions Per Order No   Other Precautions Chair Alarm;Bed Alarm;Multiple lines;Fall Risk;Pain   General   Family/Caregiver Present Yes  (father)   Cognition   Overall Cognitive Status WFL   Attention Within functional limits   Orientation Level Oriented X4   Memory Within functional limits   Following Commands Follows one step commands without difficulty   Comments cooperative and motivated   Subjective   Subjective \"Id like to walk and clean up in the bathroom. They changed my dialysis to the afternoon so Id like to work this morning.   Bed Mobility   Supine to Sit 5  Supervision   Additional items Increased time required;Verbal cues;HOB elevated   Additional Comments sits EOB w/ dizziness initially that improved w/ inc time.   Transfers   Sit to Stand 5  Supervision  (close SBA / RW)   Additional items Increased time required;Assist x 1;Verbal cues   Stand to Sit 5  Supervision   Additional items Increased time required;Verbal cues   Toilet transfer 4  Minimal assistance   Additional items Increased time required;Verbal cues;Standard toilet   Additional Comments standing time in bathroom/ at sink 10 mins (pt would occasionally lean on rail/ wall for support 2* fatigue   Ambulation/Elevation   Gait pattern Inconsistent kailee;Decreased foot clearance   Gait Assistance 4  Minimal assist   Additional items Assist x 1;Verbal cues;Tactile cues   Assistive Device Rolling walker   Distance 20+65'+15w/ RW (1 seated + 1 stnading rest break)   Stair Management Assistance Not tested   Balance   Static Sitting Fair +   Dynamic Sitting Fair   Static Standing Fair -   Dynamic Standing Poor +   Ambulatory Poor +  (rw)   Endurance Deficit   Endurance Deficit Yes   Endurance Deficit Description fatigue/ generalized " "weakness / pain   Activity Tolerance   Activity Tolerance Patient limited by fatigue;Patient limited by pain   Medical Staff Made Aware OT for d/c planning   Nurse Made Aware yes   Exercises   Knee AROM Long Arc Quad Sitting;25 reps;Bilateral   Marching Standing;15 reps   Assessment   Prognosis Good   Problem List Decreased endurance;Impaired balance;Decreased mobility;Decreased coordination;Decreased safety awareness;Pain   Assessment improved overall performance this am compared to yesterdays session. Pt also reporting \"feeling much better.\" pt was motivated and requesting therapies to work w/ him in am as his HD session was changed to PM today. Pt did require use of RW for improved safety and mobility. Pt was able to tolerate 10+ minutes standing at sinnk following ambulation to bathroom and toileting. gait traning and standing balance challenges w/ and w/o UE support w/ Iveth. Improved gait quality w/ step through pattern usign RW; 0 overt LOB. Continued cues for safety required. Pt fatigued post session but pleased w/ progress. Pt seated/ repositioned in recliner post session + alarm adn all needs in reach.   Goals   Patient Goals to continue to get better   STG Expiration Date 01/21/24   PT Treatment Day 1   Plan   Treatment/Interventions ADL retraining;Functional transfer training;LE strengthening/ROM;Elevations;Therapeutic exercise;Endurance training;Cognitive reorientation;Patient/family training;Equipment eval/education;Bed mobility;Gait training;Compensatory technique education;Continued evaluation;Spoke to MD;Spoke to nursing;Spoke to case management;Spoke to advanced practitioner;OT;Family   PT Frequency 3-5x/wk   Discharge Recommendation   Rehab Resource Intensity Level, PT III (Minimum Resource Intensity)   AM-PAC Basic Mobility Inpatient   Turning in Flat Bed Without Bedrails 3   Lying on Back to Sitting on Edge of Flat Bed Without Bedrails 3   Moving Bed to Chair 3   Standing Up From Chair Using Arms " 3   Walk in Room 3   Climb 3-5 Stairs With Railing 2   Basic Mobility Inpatient Raw Score 17   Basic Mobility Standardized Score 39.67   Highest Level Of Mobility   -HLM Goal 5: Stand one or more mins   JH-HLM Achieved 7: Walk 25 feet or more            Taryn Dukes, PT

## 2024-01-10 NOTE — PLAN OF CARE
Problem: PAIN - ADULT  Goal: Verbalizes/displays adequate comfort level or baseline comfort level  Description: Interventions:  - Encourage patient to monitor pain and request assistance  - Assess pain using appropriate pain scale  - Administer analgesics based on type and severity of pain and evaluate response  - Implement non-pharmacological measures as appropriate and evaluate response  - Consider cultural and social influences on pain and pain management  - Notify physician/advanced practitioner if interventions unsuccessful or patient reports new pain  Outcome: Progressing     Problem: INFECTION - ADULT  Goal: Absence or prevention of progression during hospitalization  Description: INTERVENTIONS:  - Assess and monitor for signs and symptoms of infection  - Monitor lab/diagnostic results  - Monitor all insertion sites, i.e. indwelling lines, tubes, and drains  - Monitor endotracheal if appropriate and nasal secretions for changes in amount and color  - Ramsay appropriate cooling/warming therapies per order  - Administer medications as ordered  - Instruct and encourage patient and family to use good hand hygiene technique  - Identify and instruct in appropriate isolation precautions for identified infection/condition  Outcome: Progressing  Goal: Absence of fever/infection during neutropenic period  Description: INTERVENTIONS:  - Monitor WBC    Outcome: Progressing     Problem: SAFETY ADULT  Goal: Patient will remain free of falls  Description: INTERVENTIONS:  - Educate patient/family on patient safety including physical limitations  - Instruct patient to call for assistance with activity   - Consult OT/PT to assist with strengthening/mobility   - Keep Call bell within reach  - Keep bed low and locked with side rails adjusted as appropriate  - Keep care items and personal belongings within reach  - Initiate and maintain comfort rounds  - Make Fall Risk Sign visible to staff  - Offer Toileting every 2 Hours,  in advance of need  - Initiate/Maintain bed alarm  - Obtain necessary fall risk management equipment: bed alarm  - Apply yellow socks and bracelet for high fall risk patients  - Consider moving patient to room near nurses station  Outcome: Progressing  Goal: Maintain or return to baseline ADL function  Description: INTERVENTIONS:  -  Assess patient's ability to carry out ADLs; assess patient's baseline for ADL function and identify physical deficits which impact ability to perform ADLs (bathing, care of mouth/teeth, toileting, grooming, dressing, etc.)  - Assess/evaluate cause of self-care deficits   - Assess range of motion  - Assess patient's mobility; develop plan if impaired  - Assess patient's need for assistive devices and provide as appropriate  - Encourage maximum independence but intervene and supervise when necessary  - Involve family in performance of ADLs  - Assess for home care needs following discharge   - Consider OT consult to assist with ADL evaluation and planning for discharge  - Provide patient education as appropriate  Outcome: Progressing  Goal: Maintains/Returns to pre admission functional level  Description: INTERVENTIONS:  - Perform AM-PAC 6 Click Basic Mobility/ Daily Activity assessment daily.  - Set and communicate daily mobility goal to care team and patient/family/caregiver.   - Collaborate with rehabilitation services on mobility goals if consulted  - Perform Range of Motion 3 times a day.  - Reposition patient every 2 hours.  - Dangle patient 2 times a day  - Stand patient 2 times a day  - Ambulate patient 2 times a day  - Out of bed to chair 2 times a day   - Out of bed for meals 3 times a day  - Out of bed for toileting  - Record patient progress and toleration of activity level   Outcome: Progressing     Problem: DISCHARGE PLANNING  Goal: Discharge to home or other facility with appropriate resources  Description: INTERVENTIONS:  - Identify barriers to discharge w/patient and  caregiver  - Arrange for needed discharge resources and transportation as appropriate  - Identify discharge learning needs (meds, wound care, etc.)  - Arrange for interpretive services to assist at discharge as needed  - Refer to Case Management Department for coordinating discharge planning if the patient needs post-hospital services based on physician/advanced practitioner order or complex needs related to functional status, cognitive ability, or social support system  Outcome: Progressing     Problem: Knowledge Deficit  Goal: Patient/family/caregiver demonstrates understanding of disease process, treatment plan, medications, and discharge instructions  Description: Complete learning assessment and assess knowledge base.  Interventions:  - Provide teaching at level of understanding  - Provide teaching via preferred learning methods  Outcome: Progressing     Problem: Nutrition/Hydration-ADULT  Goal: Nutrient/Hydration intake appropriate for improving, restoring or maintaining nutritional needs  Description: Monitor and assess patient's nutrition/hydration status for malnutrition. Collaborate with interdisciplinary team and initiate plan and interventions as ordered.  Monitor patient's weight and dietary intake as ordered or per policy. Utilize nutrition screening tool and intervene as necessary. Determine patient's food preferences and provide high-protein, high-caloric foods as appropriate.     INTERVENTIONS:  - Monitor oral intake, urinary output, labs, and treatment plans  - Assess nutrition and hydration status and recommend course of action  - Evaluate amount of meals eaten  - Assist patient with eating if necessary   - Allow adequate time for meals  - Recommend/ encourage appropriate diets, oral nutritional supplements, and vitamin/mineral supplements  - Order, calculate, and assess calorie counts as needed  - Recommend, monitor, and adjust tube feedings and TPN/PPN based on assessed needs  - Assess need for  intravenous fluids  - Provide specific nutrition/hydration education as appropriate  - Include patient/family/caregiver in decisions related to nutrition  Outcome: Progressing     Problem: METABOLIC, FLUID AND ELECTROLYTES - ADULT  Goal: Electrolytes maintained within normal limits  Description: INTERVENTIONS:  - Monitor labs and assess patient for signs and symptoms of electrolyte imbalances  - Administer electrolyte replacement as ordered  - Monitor response to electrolyte replacements, including repeat lab results as appropriate  - Instruct patient on fluid and nutrition as appropriate  Outcome: Progressing  Goal: Fluid balance maintained  Description: INTERVENTIONS:  - Monitor labs   - Monitor I/O and WT  - Instruct patient on fluid and nutrition as appropriate  - Assess for signs & symptoms of volume excess or deficit  Outcome: Progressing     Problem: Prexisting or High Potential for Compromised Skin Integrity  Goal: Skin integrity is maintained or improved  Description: INTERVENTIONS:  - Identify patients at risk for skin breakdown  - Assess and monitor skin integrity  - Assess and monitor nutrition and hydration status  - Monitor labs   - Assess for incontinence   - Turn and reposition patient  - Assist with mobility/ambulation  - Relieve pressure over bony prominences  - Avoid friction and shearing  - Provide appropriate hygiene as needed including keeping skin clean and dry  - Evaluate need for skin moisturizer/barrier cream  - Collaborate with interdisciplinary team   - Patient/family teaching  - Consider wound care consult   Outcome: Progressing

## 2024-01-10 NOTE — PLAN OF CARE
Problem: OCCUPATIONAL THERAPY ADULT  Goal: Performs self-care activities at highest level of function for planned discharge setting.  See evaluation for individualized goals.  Description: Treatment Interventions: ADL retraining, Functional transfer training, UE strengthening/ROM, Endurance training, Equipment evaluation/education, Compensatory technique education, Continued evaluation, Energy conservation, Activityengagement          See flowsheet documentation for full assessment, interventions and recommendations.   Outcome: Progressing  Note: Limitation: Decreased ADL status, Decreased Safe judgement during ADL, Decreased endurance, Decreased self-care trans, Decreased high-level ADLs  Prognosis: Fair  Assessment: Patient participated in Skilled OT session 1/10/2024 with interventions consisting of ADL re training with the use of correct body mechnaics, Energy Conservation techniques, deep breathing technique, safety awareness and fall prevention techniques, therapeutic exercise to: increase functional use of BUEs, increase BUE muscle strength ,  therapeutic activities to: increase activity tolerance, increase standing tolerance time with unilateral UE support to complete sink level ADLs, increase dynamic sit/ stand balance during functional activity , increase postural control, increase trunk control, and increase OOB/ sitting tolerance . Patient agreeable to OT treatment session, upon arrival patient was found supine in bed.  In comparison to previous session, patient with improvements in standing tolerance, toileting, ADLS and endurance . Patient requiring ocassional safety reminders. Patient continues to be functioning below baseline level, occupational performance remains limited secondary to factors listed above and increased risk for falls and injury.   From OT standpoint, recommendation at time of d/c would be Outpatient OT when medically stable.   Patient to benefit from continued Occupational Therapy  treatment while in the hospital to address deficits as defined above and maximize level of functional independence with ADLs and functional mobility.     Rehab Resource Intensity Level, OT: III (Minimum Resource Intensity)     Vicki Narayanan MS, OTR/L

## 2024-01-10 NOTE — PROGRESS NOTES
Critical Care Interval Transfer Note:    Please refer to progress note from earlier today for full details.     Barriers to discharge:   Repeat Angiogram  Continued vasospasm watch     Consults: IP CONSULT TO CASE MANAGEMENT  IP CONSULT TO NEUROSURGERY  IP CONSULT TO NEPHROLOGY  IP CONSULT TO ENDOCRINOLOGY  IP CONSULT TO PODIATRY  IP CONSULT TO ENT       Discharge Plan: Anticipate discharge in >72 hrs to home.            Patient seen and evaluated by Critical Care today and deemed to be appropriate for transfer to Stepdown Level 2. Spoke to Dr. Herr from [facility] to accept transfer. Critical care can be contacted via [employer] with any questions or concerns.

## 2024-01-10 NOTE — PLAN OF CARE
"  Problem: PHYSICAL THERAPY ADULT  Goal: Performs mobility at highest level of function for planned discharge setting.  See evaluation for individualized goals.  Description: Treatment/Interventions: Functional transfer training, LE strengthening/ROM, Therapeutic exercise, Endurance training, Elevations, Cognitive reorientation, Patient/family training, Equipment eval/education, Bed mobility, Gait training, Spoke to nursing, Spoke to case management, OT, Family          See flowsheet documentation for full assessment, interventions and recommendations.  Outcome: Progressing  Note: Prognosis: Good  Problem List: Decreased endurance, Impaired balance, Decreased mobility, Decreased coordination, Decreased safety awareness, Pain  Assessment: improved overall performance this am compared to yesterdays session. Pt also reporting \"feeling much better.\" pt was motivated and requesting therapies to work w/ him in am as his HD session was changed to PM today. Pt did require use of RW for improved safety and mobility. Pt was able to tolerate 10+ minutes standing at sinnk following ambulation to bathroom and toileting. gait traning and standing balance challenges w/ and w/o UE support w/ Iveth. Improved gait quality w/ step through pattern usign RW; 0 overt LOB. Continued cues for safety required. Pt fatigued post session but pleased w/ progress. Pt seated/ repositioned in recliner post session + alarm adn all needs in reach.  Barriers to Discharge: Inaccessible home environment     Rehab Resource Intensity Level, PT: III (Minimum Resource Intensity)    See flowsheet documentation for full assessment.        "

## 2024-01-10 NOTE — PROGRESS NOTES
[facility]  Progress Note  Name: Mateus Mckeon I  MRN: [id number]  Unit/Bed#: ICU 10 I Date of Admission: 1/5/2024   Date of Service: 1/10/2024 I Hospital Day: 5    Assessment/Plan   * Subarachnoid hemorrhage (HCC)  Assessment & Plan  S/p Cerebral angiography on 1/5/24 by Dr. Mittal-  angio negative.   Right basilar cistern SAH, BD 11, HH 2, MF 3.  P/w HA that started on 12/31/23, 5 days prior to admission on 1/4/24.   Hx ASA use for hx multiple strokes, reversed with DDAVP.  MRIs negative for source of SAH, small scattered acute/subacute infarcts noted.   Pt on hemodialysis for CKD.   On current exam, c/o headache, otherwise non-focal. GCS 15.     Imaging:  CT head wo 1/9/24: Improving subarachnoid hemorrhage compared to recent prior studies. No new findings.   MRI brain wo 1/8/24:Stable hematoma in the right CP angle, prepontine and premedullary cisterns. New subarachnoid hemorrhage over the convexities and within the lateral ventricles likely due to redistribution.Few tiny scattered recent infarcts in multiple vascular distributions.  MRI cervical spine wo 1/8/24:No identifiable etiology for subarachnoid hemorrhage.Mild congenital canal stenosis.New marrow edema at C5-C6 with severe disc space narrowing that is similar to recent CT. Modic type I endplate degenerative changes favored over infection but recommend clinical correlation and follow-up imaging if warranted.    Plan:  Continue to monitor neuro exam closely.  STAT CTA with decline in GCS > 2 pts in 1 hour.  Maintain SBP < 160 mmHg.  Pt with hx of strokes. Home ASA on hold due to acute SAH. Continue to hold ASA at this time.   Will maintain on SAH pathway at this time.   Spasm watch  Daily TCDs: R 1.254, L 2.3  Continue Nimodipine 60mg Q4H  Normonatremia (today 133), euvolemia (-62 ml/24H), Hgb >8 (today 8.2)   SBP<160 mmHg  Mobilize as tolerated.  DVT ppx: SCD's,  SQH.  Tentative plan for repeat Cerebral  "angiography on Friday 1/12/24 by Dr. Mittal.     Neurosurgery will continue to follow. Call with questions.      End stage kidney disease (HCC)  Assessment & Plan  Lab Results   Component Value Date    EGFR 7 01/10/2024    EGFR 5 01/09/2024    EGFR 3 01/08/2024    CREATININE 8.44 (H) 01/10/2024    CREATININE 9.84 (H) 01/09/2024    CREATININE 14.28 (H) 01/08/2024     On HD (MWF)           Subjective/Objective     Chief Complaint: \"I got some medicine this morning, now my headache has decreased\"    Subjective: Patient reported mild headache that he rated as 2-3/10 on the pain scale after he got pain medication this morning.  Patient also reports some dizziness particularly when he got up to mobilize.  Patient reports that he was able to mobilize to the bathroom and out in the hallway this morning with therapy.  Patient reports that he was able to tolerate oral intake without vomiting.  Patient denies any visual disturbance.  Patient denies any new numbness, tingling, or weakness in bilateral upper and lower extremities.    Objective: Alert and awake, No acute distress.       I/O         01/08 0701 01/09 0700 01/09 0701  01/10 0700 01/10 0701 01/11 0700    P.O. 190  180    I.V. (mL/kg) 1365.4 (14.1) 437.9 (4.5)     Total Intake(mL/kg) 1555.4 (16.1) 437.9 (4.5) 180 (1.9)    Urine (mL/kg/hr) 0 (0)      Other 4300 500     Stool  0     Total Output 4300 500     Net -2744.6 -62.1 +180           Unmeasured Stool Occurrence  2 x             Invasive Devices       Peripheral Intravenous Line  Duration             Peripheral IV 01/08/24 Right;Upper;Ventral (anterior) Arm 1 day    Peripheral IV 01/09/24 Dorsal (posterior);Right Hand 1 day              Line  Duration             Hemodialysis Access 11/02/20 Left Forearm 1164 days    Hemodialysis AV Fistula 11/09/20 Left Other (Comment) 1156 days                    Physical Exam:  Vitals: Blood pressure (!) 176/67, pulse 72, temperature 98.4 °F (36.9 °C), temperature source " "Oral, resp. rate 20, height 5' 5\" (1.651 m), weight 96.8 kg (213 lb 6.5 oz), SpO2 99%.,Body mass index is 35.51 kg/m².      General appearance:  Appears stated age  Head: Normocephalic, without obvious abnormality, atraumatic  Eyes: EOMI, PERRL  Neck: supple, symmetrical, trachea midline   Lungs: non labored breathing  Heart: regular heart rate  Neurologic:   Mental status: Alert, oriented x 3, thought content appropriate  Cranial nerves: grossly intact (Cranial nerves II-XII)  Sensory: normal to LT X 4 except decreased in bilateral feet and fingertips due to chronic neuropathy.  Motor: moving all extremities, bilateral upper extremities 5/5, bilateral lower extremities 5 -/5.  Coordination: no drift in bilateral upper extremities    Lab Results:  Results from last 7 days   Lab Units 01/10/24  0557 01/09/24  0436 01/08/24  0654   WBC Thousand/uL 6.49 10.48* 13.04*   HEMOGLOBIN g/dL 8.2* 9.4* 10.3*   HEMATOCRIT % 24.9* 27.9* 30.1*   PLATELETS Thousands/uL 192 220 217   NEUTROS PCT % 76* 83* 80*   MONOS PCT % 9 6 6   EOS PCT % 0 0 3     Results from last 7 days   Lab Units 01/10/24  0557 01/09/24  0436 01/08/24  0654 01/05/24  0548 01/04/24  2243   POTASSIUM mmol/L 4.4 4.3 5.2   < > 5.3   CHLORIDE mmol/L 92* 94* 93*   < > 91*   CO2 mmol/L 23 25 19*   < > 27   BUN mg/dL 37* 41* 62*   < > 56*   CREATININE mg/dL 8.44* 9.84* 14.28*   < > 13.49*   CALCIUM mg/dL 8.6 8.5 8.3*   < > 9.0   ALK PHOS U/L  --   --   --   --  79   ALT U/L  --   --   --   --  10   AST U/L  --   --   --   --  15    < > = values in this interval not displayed.     Results from last 7 days   Lab Units 01/10/24  0557 01/09/24  0436 01/08/24  0654   MAGNESIUM mg/dL 2.2 2.1 2.3     Results from last 7 days   Lab Units 01/10/24  0557 01/09/24  0436 01/08/24  0654   PHOSPHORUS mg/dL 5.7* 6.2* 6.1*     Results from last 7 days   Lab Units 01/05/24  0614 01/05/24  0351   INR   --  1.04   PTT seconds 29 30     No results found for: \"TROPONINT\"  ABG:  Lab " Results   Component Value Date    PHART 7.454 (H) 02/21/2020    JJW7NQU 33.2 (L) 02/21/2020    PO2ART 170.0 (H) 02/21/2020    [id number] 22.8 02/21/2020    BEART -0.8 02/21/2020    SOURCE Radial, Right 02/21/2020       Imaging Studies: I have personally reviewed pertinent reports and I have personally reviewed pertinent films in PACS    EKG, Pathology, and Other Studies: I have personally reviewed pertinent reports.        PLEASE NOTE:  This encounter may have been completed utilizing the [employer] Direct Speech Voice Recognition Software. Grammatical errors, random word insertions, pronoun errors and incomplete sentences are occasional consequences of the system due to software limitations, ambient noise and hardware issues.These may be missed by proof reading prior to affixing electronic signature. Any questions or concerns about the content, text or information contained within the body of this dictation should be directly addressed to the advanced practitioner or physician for clarification.

## 2024-01-10 NOTE — OCCUPATIONAL THERAPY NOTE
Occupational Therapy Treatment Note      Mateus Mckeon    1/10/2024    Principal Problem:    Subarachnoid hemorrhage (HCC)  Active Problems:    Hyperphosphatemia    Anemia in chronic kidney disease, on chronic dialysis (HCC)    End stage kidney disease (HCC)    Anemia due to chronic kidney disease, on chronic dialysis (HCC)    Abnormal finding on MRI of brain      Past Medical History:   Diagnosis Date    Acute kidney injury (HCC)     Ambulates with cane     Anuria     Anxiety     Cellulitis of right elbow 03/31/2021    Chronic kidney disease     Depression     Diabetes mellitus (HCC)     Diarrhea     Emesis 10/24/2020    End stage renal disease (HCC) 02/11/2018    Formatting of this note might be different from the original. Last Assessment & Plan:  Secondary to DM.  On nightly PD.  Followed by Nephro.  Patient considering transplant for kidney and pancreas through [facility] Formatting of this note might be different from the original. Last Assessment & Plan:  Formatting of this note might be different from the original. 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    Esophagitis 07/21/2015    Falls     Gastroparesis     GERD (gastroesophageal reflux disease)     History of shingles 2010    History of transfusion 02/2018    no adverse reaction    Hyperlipidemia     Hyperphosphatemia     Hypertension     Hypoglycemia 07/15/2022    Itching     Mastoiditis of right side 07/15/2022    Muscle weakness     general unsteadiness    Obesity (BMI 30.0-34.9) 09/09/2019    Orthostatic hypotension 10/25/2020    Peripheral polyneuropathy 11/20/2019    PONV (postoperative nausea and vomiting) 01/26/2018    Protein-calorie malnutrition (HCC) 11/23/2020    Recurrent peritonitis (HCC) due to peritoneal dialysis catheter 07/31/2020    Retinopathy     Seizures (HCC)     early 2020 - one time    Skin abnormality     some dime size areas where skin was scratched from itching    Spontaneous  bacterial peritonitis (HCC) 10/19/2020    Squamous cell skin cancer     left temple    Stroke (HCC)     x2 - off balance/no driving/fatigue    Swelling of both lower extremities     Traumatic onycholysis 07/21/2022    Vomiting     Wears glasses        Past Surgical History:   Procedure Laterality Date    CARDIAC ELECTROPHYSIOLOGY PROCEDURE N/A 9/21/2023    Procedure: Cardiac loop recorder explant;  Surgeon: Parish Morgan MD;  [facility]: [facility];  Service: Cardiology    CARDIAC LOOP RECORDER  05/2018    COLONOSCOPY      EGD      EYE SURGERY Right     IR AV FISTULAGRAM/GRAFTOGRAM  02/23/2021    IR CEREBRAL ANGIOGRAPHY / INTERVENTION  1/5/2024    IR TUNNELED CENTRAL LINE PLACEMENT  02/16/2021    IR TUNNELED DIALYSIS CATHETER PLACEMENT  11/18/2020    IR TUNNELED DIALYSIS CATHETER REMOVAL  02/12/2021    IR TUNNELED DIALYSIS CATHETER REMOVAL  03/11/2021    MOHS SURGERY Left 12/14/2022    Left temple with Dr. Hassan    PERITONEAL CATHETER INSERTION N/A 08/27/2018    Procedure: UNROOF PD CATHETER;  Surgeon: Felipe Lindo DO;  Location: [facility];  Service: General    NC ARTERIOVENOUS ANASTOMOSIS OPEN DIRECT Left 11/09/2020    Procedure: CREATION FISTULA  ARTERIOVENOUS (AV) - LEFT WRIST;  Surgeon: Placido Altamirano MD;  Location: [facility];  Service: Vascular    NC ESOPHAGOGASTRODUODENOSCOPY TRANSORAL DIAGNOSTIC N/A 04/18/2019    Procedure: ESOPHAGOGASTRODUODENOSCOPY (EGD);  Surgeon: Ale Figueroa MD;  Location: [facility];  Service: Gastroenterology    NC LAPS INSERTION TUNNELED INTRAPERITONEAL CATHETER N/A 08/06/2018    Procedure: LAPAROSCOPIC PD CATHETER PLACEMENT;  Surgeon: Felipe Lindo DO;  [facility]: [facility];  Service: General    NC REMOVAL TUNNELED INTRAPERITONEAL CATHETER N/A 11/18/2020    Procedure: REMOVAL CATHETER PERITONEAL DIALYSIS;  Surgeon: Abdifatah Ty MD;  [facility]: [facility];  Service: General    TONSILLECTOMY      UPPER GASTROINTESTINAL ENDOSCOPY          01/10/24 0943   OT Last Visit    OT Visit Date 01/10/24   Note Type   Note Type Treatment   Pain Assessment   Pain Assessment Tool 0-10   Pain Score 3   Pain Location/Orientation Location: Head   Pain Onset/Description Onset: Ongoing;Descriptor: Aching;Descriptor: Headache   Patient's Stated Pain Goal No pain   Hospital Pain Intervention(s) Repositioned;Ambulation/increased activity;Emotional support   Restrictions/Precautions   Weight Bearing Precautions Per Order No   Other Precautions Bed Alarm;Multiple lines;Telemetry;Fall Risk;Pain   Lifestyle   Autonomy Pt reports being I with ADLs and receivies assistance with IADLs.Pt uses SPC for community mobility   Reciprocal Relationships Pt lives with parents- both retired   Service to Others Pt is unemployed   Intrinsic Gratification Pt enjoys relaxing   ADL   Grooming Assistance 5  Supervision/Setup   Grooming Deficit Setup;Verbal cueing;Supervision/safety;Increased time to complete;Teeth care;Wash/dry face;Wash/dry hands;Standing with assistive device   Grooming Comments Pt completed grooming at sink w/ setup; stood w/ CGA. Able to complete oral care and wash/dry hands and face   UB Bathing Assistance 5  Supervision/Setup   UB Bathing Deficit Setup;Verbal cueing;Supervision/safety;Increased time to complete;Right arm;Left arm;Chest;Abdomen   UB Bathing Comments bathed UB standing at sink w/ setup   LB Bathing Assistance 4  Minimal Assistance   LB Bathing Deficit Setup;Right upper leg;Left upper leg;Perineal area;Buttocks   LB Bathing Comments pt bathed LB in standing w/ CGA for dynamic reach/balance   UB Dressing Assistance 5  Supervision/Setup   UB Dressing Deficit Setup;Supervision/safety;Thread RUE;Thread LUE;Pull around back   UB Dressing Comments pt don/doffed gown w. setup   Toileting Assistance  5  Supervision/Setup   Toileting Deficit Setup;Steadying;Supervison/safety;Verbal cueing;Increased time to complete;Perineal hygiene   Toileting Comments pt completed toileting on standard toilet w/  S; CGA for transfer   Functional Standing Tolerance   Time 10 mins   Activity ADLs at sink   Comments RW for support in standing; CGA for dynamic reach. Leaned against wall occasionally and used grab bar on R side for standing support when fatigued/dizzy   Bed Mobility   Supine to Sit 5  Supervision   Additional items HOB elevated;Verbal cues   Sit to Supine Unable to assess   Additional Comments pt sat EOB w/ Fair sitting balance/trunk control   Transfers   Sit to Stand 5  Supervision   Additional items Assist x 1;Increased time required;Verbal cues   Stand to Sit 5  Supervision   Additional items Assist x 1;Increased time required;Verbal cues   Additional Comments w/ RW; occasional CGA needed w/ fatigue/dizziness   Functional Mobility   Functional Mobility 4  Minimal assistance   Additional Comments Pt engaged in short household distance functoinal mobility w/ CGA; use of RW   Additional items Rolling walker   Toilet Transfers   Toilet Transfer From Bed   Toilet Transfer Type To and from   Toilet Transfer to Standard toilet   Toilet Transfer Technique Ambulating   Toilet Transfers Contact guard   Cognition   Overall Cognitive Status WFL   Arousal/Participation Responsive;Cooperative   Attention Within functional limits   Orientation Level Oriented X4   Memory Within functional limits   Following Commands Follows one step commands without difficulty   Comments pt is pleasant and cooperative; needs occasional cues for safety   Activity Tolerance   Activity Tolerance Patient limited by fatigue   Medical Staff Made Aware PTTaryn and RNMiguel   Assessment   Assessment Patient participated in Skilled OT session 1/10/2024 with interventions consisting of ADL re training with the use of correct body mechnaics, Energy Conservation techniques, deep breathing technique, safety awareness and fall prevention techniques, therapeutic exercise to: increase functional use of BUEs, increase BUE muscle strength ,  therapeutic  activities to: increase activity tolerance, increase standing tolerance time with unilateral UE support to complete sink level ADLs, increase dynamic sit/ stand balance during functional activity , increase postural control, increase trunk control, and increase OOB/ sitting tolerance . Patient agreeable to OT treatment session, upon arrival patient was found supine in bed.  In comparison to previous session, patient with improvements in standing tolerance, toileting, ADLS and endurance . Patient requiring ocassional safety reminders. Patient continues to be functioning below baseline level, occupational performance remains limited secondary to factors listed above and increased risk for falls and injury.   From OT standpoint, recommendation at time of d/c would be Outpatient OT when medically stable.   Patient to benefit from continued Occupational Therapy treatment while in the hospital to address deficits as defined above and maximize level of functional independence with ADLs and functional mobility.   Plan   Treatment Interventions ADL retraining;Functional transfer training;UE strengthening/ROM;Endurance training;Cognitive reorientation;Patient/family training;Equipment evaluation/education;Compensatory technique education;Continued evaluation;Energy conservation;Activityengagement   Goal Expiration Date 01/23/24   OT Treatment Day 1   OT Frequency 3-5x/wk   Discharge Recommendation   Rehab Resource Intensity Level, OT III (Minimum Resource Intensity)   Additional Comments  The patient's raw score on the AM-PAC Daily Activity Inpatient Short Form is 21. A raw score of greater than or equal to 19 suggests the patient may benefit from discharge to home. Please refer to the recommendation of the Occupational Therapist for safe discharge planning.   Additional Comments 2 Pt seen as a co-session due to the patient's co-morbidities, clinically unstable presentation, and present impairments which are a regression from  the patient's baseline.   AM-PAC Daily Activity Inpatient   Lower Body Dressing 3   Bathing 3   Toileting 3   Upper Body Dressing 4   Grooming 4   Eating 4   Daily Activity Raw Score 21   Daily Activity Standardized Score (Calc for Raw Score >=11) 44.27   AM-PAC Applied Cognition Inpatient   Following a Speech/Presentation 3   Understanding Ordinary Conversation 4   Taking Medications 4   Remembering Where Things Are Placed or Put Away 4   Remembering List of 4-5 Errands 4   Taking Care of Complicated Tasks 3   Applied Cognition Raw Score 22   Applied Cognition Standardized Score 47.83   End of Consult   Education Provided Yes   Patient Position at End of Consult Bedside chair;All needs within reach;Bed/Chair alarm activated   Nurse Communication Nurse aware of consult     Vicki Narayanan MS, OTR/L

## 2024-01-11 ENCOUNTER — APPOINTMENT (INPATIENT)
Dept: NON INVASIVE DIAGNOSTICS | Facility: HOSPITAL | Age: 39
DRG: 064 | End: 2024-01-11
Payer: MEDICARE

## 2024-01-11 PROBLEM — I10 PRIMARY HYPERTENSION: Status: ACTIVE | Noted: 2024-01-11

## 2024-01-11 LAB
ALBUMIN SERPL BCP-MCNC: 3.3 G/DL (ref 3.5–5)
ALP SERPL-CCNC: 64 U/L (ref 34–104)
ALT SERPL W P-5'-P-CCNC: 9 U/L (ref 7–52)
ANION GAP SERPL CALCULATED.3IONS-SCNC: 11 MMOL/L
AST SERPL W P-5'-P-CCNC: 13 U/L (ref 13–39)
BILIRUB SERPL-MCNC: 0.53 MG/DL (ref 0.2–1)
BUN SERPL-MCNC: 25 MG/DL (ref 5–25)
CALCIUM ALBUM COR SERPL-MCNC: 8.8 MG/DL (ref 8.3–10.1)
CALCIUM SERPL-MCNC: 8.2 MG/DL (ref 8.4–10.2)
CHLORIDE SERPL-SCNC: 99 MMOL/L (ref 96–108)
CO2 SERPL-SCNC: 28 MMOL/L (ref 21–32)
CREAT SERPL-MCNC: 6.69 MG/DL (ref 0.6–1.3)
GFR SERPL CREATININE-BSD FRML MDRD: 9 ML/MIN/1.73SQ M
GLUCOSE SERPL-MCNC: 153 MG/DL (ref 65–140)
GLUCOSE SERPL-MCNC: 167 MG/DL (ref 65–140)
GLUCOSE SERPL-MCNC: 186 MG/DL (ref 65–140)
GLUCOSE SERPL-MCNC: 205 MG/DL (ref 65–140)
GLUCOSE SERPL-MCNC: 225 MG/DL (ref 65–140)
GLUCOSE SERPL-MCNC: 377 MG/DL (ref 65–140)
POTASSIUM SERPL-SCNC: 4.1 MMOL/L (ref 3.5–5.3)
PROT SERPL-MCNC: 5.5 G/DL (ref 6.4–8.4)
SODIUM SERPL-SCNC: 138 MMOL/L (ref 135–147)

## 2024-01-11 PROCEDURE — 99232 SBSQ HOSP IP/OBS MODERATE 35: CPT | Performed by: INTERNAL MEDICINE

## 2024-01-11 PROCEDURE — 80053 COMPREHEN METABOLIC PANEL: CPT

## 2024-01-11 PROCEDURE — 82948 REAGENT STRIP/BLOOD GLUCOSE: CPT

## 2024-01-11 PROCEDURE — 97116 GAIT TRAINING THERAPY: CPT

## 2024-01-11 PROCEDURE — 93886 INTRACRANIAL COMPLETE STUDY: CPT

## 2024-01-11 PROCEDURE — 93886 INTRACRANIAL COMPLETE STUDY: CPT | Performed by: SURGERY

## 2024-01-11 PROCEDURE — 99232 SBSQ HOSP IP/OBS MODERATE 35: CPT | Performed by: PHYSICIAN ASSISTANT

## 2024-01-11 RX ORDER — LANOLIN ALCOHOL/MO/W.PET/CERES
6 CREAM (GRAM) TOPICAL
Status: DISCONTINUED | OUTPATIENT
Start: 2024-01-11 | End: 2024-01-23 | Stop reason: HOSPADM

## 2024-01-11 RX ORDER — HYDRALAZINE HYDROCHLORIDE 50 MG/1
100 TABLET, FILM COATED ORAL 3 TIMES DAILY
Status: DISCONTINUED | OUTPATIENT
Start: 2024-01-11 | End: 2024-01-17

## 2024-01-11 RX ORDER — LISINOPRIL 20 MG/1
40 TABLET ORAL DAILY
Status: DISCONTINUED | OUTPATIENT
Start: 2024-01-12 | End: 2024-01-17

## 2024-01-11 RX ORDER — LISINOPRIL 20 MG/1
40 TABLET ORAL DAILY
Status: DISCONTINUED | OUTPATIENT
Start: 2024-01-11 | End: 2024-01-11

## 2024-01-11 RX ADMIN — CALCIUM ACETATE 667 MG: 667 CAPSULE ORAL at 17:10

## 2024-01-11 RX ADMIN — ESCITALOPRAM OXALATE 20 MG: 20 TABLET ORAL at 08:04

## 2024-01-11 RX ADMIN — Medication 2.5 MG: at 08:17

## 2024-01-11 RX ADMIN — Medication 10 MG: at 00:10

## 2024-01-11 RX ADMIN — Medication 10 MG: at 10:41

## 2024-01-11 RX ADMIN — CALCIUM ACETATE 667 MG: 667 CAPSULE ORAL at 08:04

## 2024-01-11 RX ADMIN — SENNOSIDES, DOCUSATE SODIUM 1 TABLET: 8.6; 5 TABLET ORAL at 17:11

## 2024-01-11 RX ADMIN — LABETALOL HYDROCHLORIDE 800 MG: 200 TABLET, FILM COATED ORAL at 08:04

## 2024-01-11 RX ADMIN — NIMODIPINE 60 MG: 30 CAPSULE, LIQUID FILLED ORAL at 11:36

## 2024-01-11 RX ADMIN — HYDRALAZINE HYDROCHLORIDE 100 MG: 50 TABLET, FILM COATED ORAL at 20:54

## 2024-01-11 RX ADMIN — LABETALOL HYDROCHLORIDE 800 MG: 200 TABLET, FILM COATED ORAL at 20:54

## 2024-01-11 RX ADMIN — ACETAMINOPHEN 975 MG: 325 TABLET, FILM COATED ORAL at 05:13

## 2024-01-11 RX ADMIN — NIMODIPINE 60 MG: 30 CAPSULE, LIQUID FILLED ORAL at 08:07

## 2024-01-11 RX ADMIN — NIMODIPINE 60 MG: 30 CAPSULE, LIQUID FILLED ORAL at 20:51

## 2024-01-11 RX ADMIN — HYDRALAZINE HYDROCHLORIDE 50 MG: 50 TABLET, FILM COATED ORAL at 08:04

## 2024-01-11 RX ADMIN — HYDRALAZINE HYDROCHLORIDE 10 MG: 20 INJECTION, SOLUTION INTRAMUSCULAR; INTRAVENOUS at 11:35

## 2024-01-11 RX ADMIN — HEPARIN SODIUM 5000 UNITS: 5000 INJECTION INTRAVENOUS; SUBCUTANEOUS at 05:13

## 2024-01-11 RX ADMIN — LISINOPRIL 40 MG: 20 TABLET ORAL at 08:04

## 2024-01-11 RX ADMIN — GABAPENTIN 300 MG: 300 CAPSULE ORAL at 14:36

## 2024-01-11 RX ADMIN — HYDRALAZINE HYDROCHLORIDE 10 MG: 20 INJECTION, SOLUTION INTRAMUSCULAR; INTRAVENOUS at 01:21

## 2024-01-11 RX ADMIN — CALCIUM ACETATE 667 MG: 667 CAPSULE ORAL at 11:36

## 2024-01-11 RX ADMIN — Medication 2.5 MG: at 20:01

## 2024-01-11 RX ADMIN — HEPARIN SODIUM 5000 UNITS: 5000 INJECTION INTRAVENOUS; SUBCUTANEOUS at 21:01

## 2024-01-11 RX ADMIN — NIMODIPINE 60 MG: 30 CAPSULE, LIQUID FILLED ORAL at 00:07

## 2024-01-11 RX ADMIN — Medication 10 MG: at 20:01

## 2024-01-11 RX ADMIN — HYDRALAZINE HYDROCHLORIDE 100 MG: 50 TABLET, FILM COATED ORAL at 17:10

## 2024-01-11 RX ADMIN — NIMODIPINE 60 MG: 30 CAPSULE, LIQUID FILLED ORAL at 17:00

## 2024-01-11 RX ADMIN — NIFEDIPINE 90 MG: 30 TABLET, FILM COATED, EXTENDED RELEASE ORAL at 20:56

## 2024-01-11 RX ADMIN — FAMOTIDINE 40 MG: 20 TABLET, FILM COATED ORAL at 08:04

## 2024-01-11 RX ADMIN — ACETAMINOPHEN 975 MG: 325 TABLET, FILM COATED ORAL at 00:07

## 2024-01-11 RX ADMIN — ACETAMINOPHEN 975 MG: 325 TABLET, FILM COATED ORAL at 11:36

## 2024-01-11 RX ADMIN — CINACALCET 60 MG: 30 TABLET ORAL at 08:04

## 2024-01-11 RX ADMIN — SENNOSIDES, DOCUSATE SODIUM 1 TABLET: 8.6; 5 TABLET ORAL at 08:04

## 2024-01-11 RX ADMIN — NIMODIPINE 60 MG: 30 CAPSULE, LIQUID FILLED ORAL at 05:13

## 2024-01-11 RX ADMIN — ACETAMINOPHEN 975 MG: 325 TABLET, FILM COATED ORAL at 17:10

## 2024-01-11 RX ADMIN — ATORVASTATIN CALCIUM 40 MG: 40 TABLET, FILM COATED ORAL at 17:11

## 2024-01-11 RX ADMIN — LABETALOL HYDROCHLORIDE 800 MG: 200 TABLET, FILM COATED ORAL at 17:11

## 2024-01-11 RX ADMIN — HEPARIN SODIUM 5000 UNITS: 5000 INJECTION INTRAVENOUS; SUBCUTANEOUS at 14:36

## 2024-01-11 NOTE — PROGRESS NOTES
NEPHROLOGY HOSPITAL PROGRESS NOTE   Mateus Mckeon 38 y.o. male MRN: [id number]  [facility] Encounter: [id number]  Reason for Consult: ESRD on HD     ASSESSMENT and PLAN:  38-year-old male with ESRD on HD, hypertension presented with headache and found to have subarachnoid hemorrhage.  We are consulted for management of ESRD on HD.    1.  ESRD on HD.  [facility].  Status post dialysis on Monday/Tuesday/Wednesday after administration of gadolinium with MRI.  Electrolytes and volume status stable today.  Next hemodialysis session will be planned for tomorrow.    2.  Access.  Left upper extremity AV fistula.    3.  Subarachnoid hemorrhage.  Management per neurosurgery.  Goal is normonatremia per neurosurgery.  Serum sodium was 133 yesterday morning status post dialysis against sodium bath of 142.  Serum sodium today 138 at goal.    4.  Hypertension.  Systolic blood pressure goal less than 160.  Currently on clonidine 0.3 mg weekly patch, hydralazine 50 mg 3 times daily, labetalol 800 mg 3 times daily, lisinopril 40 mg twice daily, nimodipine 60 mg every 4 hours, nifedipine 90 mg daily as well as as needed hydralazine.  Decrease lisinopril to 40 mg daily and increase hydralazine to 100 mg 3 times daily.    5.  Hyperphosphatemia.  Continue PhosLo 1 tablet 3 times daily with meals.    6.  Secondary hyperparathyroidism.  Continue Sensipar 60 mg daily.  Monitor PTH as outpatient.    7.  Anemia in CKD.  Continue to monitor hemoglobin.  Transfuse for hemoglobin less than 7.    Discussed with internal medicine team.  After discussion, we agreed to decrease lisinopril to 40 mg daily and increase hydralazine to 100 mg 3 times daily for ongoing control of blood pressure.    SUBJECTIVE / 24H INTERVAL HISTORY:  Patient seen and examined this morning.  He had 3 back-to-back sessions of dialysis this week.  He is currently feeling tired.  He denies dyspnea.  He denies leg swelling.    REVIEW OF SYSTEMS:  Review  of Systems   Constitutional:  Positive for fatigue.   HENT:  Negative for ear pain and sore throat.    Eyes:  Negative for pain and visual disturbance.   Respiratory:  Negative for cough and shortness of breath.    Cardiovascular:  Negative for chest pain and palpitations.   Gastrointestinal:  Negative for abdominal pain and vomiting.   Genitourinary:  Negative for dysuria and hematuria.   Musculoskeletal:  Negative for arthralgias and back pain.   Skin:  Negative for color change and rash.   Neurological:  Negative for seizures and syncope.   All other systems reviewed and are negative.      OBJECTIVE:  Current Weight: Weight - Scale: 96.8 kg (213 lb 6.5 oz)  Vitals:    01/11/24 0000 01/11/24 0100 01/11/24 0121 01/11/24 0156   BP: 170/90 169/85 169/85 154/68   BP Location: Right arm Right arm     Pulse: 68 64     Resp: 16 15     Temp: 98.6 °F (37 °C)      TempSrc: Oral      SpO2: 99% 98%     Weight:       Height:           Intake/Output Summary (Last 24 hours) at 1/11/2024 0639  Last data filed at 1/10/2024 1800  Gross per 24 hour   Intake 1040 ml   Output 500 ml   Net 540 ml     Physical Exam  Vitals and nursing note reviewed.   Constitutional:       General: He is not in acute distress.     Appearance: He is well-developed.   HENT:      Head: Normocephalic and atraumatic.   Eyes:      Conjunctiva/sclera: Conjunctivae normal.   Cardiovascular:      Rate and Rhythm: Normal rate and regular rhythm.      Pulses: Normal pulses.      Heart sounds: Normal heart sounds. No murmur heard.     Comments: Left upper extremity AV fistula  Pulmonary:      Effort: Pulmonary effort is normal. No respiratory distress.      Breath sounds: Normal breath sounds.   Abdominal:      Palpations: Abdomen is soft.      Tenderness: There is no abdominal tenderness.   Musculoskeletal:         General: No swelling.      Cervical back: Neck supple.      Right lower leg: No edema.      Left lower leg: No edema.   Skin:     General: Skin is warm  and dry.      Capillary Refill: Capillary refill takes less than 2 seconds.   Neurological:      Mental Status: He is alert.   Psychiatric:         Mood and Affect: Mood normal.       Medications:    Current Facility-Administered Medications:     acetaminophen (TYLENOL) tablet 975 mg, 975 mg, Oral, Q6H [facility] hospital[facility] MD, 975 mg at 01/11/24 0513    atorvastatin (LIPITOR) tablet 40 mg, 40 mg, Oral, QPM, Radha Steen MD, 40 mg at 01/10/24 1742    bisacodyl (DULCOLAX) rectal suppository 10 mg, 10 mg, Rectal, Daily PRN, Radha Steen MD    calcium acetate (PHOSLO) capsule 667 mg, 667 mg, Oral, TID With Meals, Radha Steen MD, 667 mg at 01/10/24 1608    cinacalcet (SENSIPAR) tablet 60 mg, 60 mg, Oral, Daily, Radha Steen MD, 60 mg at 01/10/24 0804    cloNIDine (CATAPRES-TTS-3) 0.3 mg/24 hr TD weekly patch, 0.3 mg, Transdermal, Weekly, Radha Steen MD, 0.3 mg at 01/05/24 0608    escitalopram (LEXAPRO) tablet 20 mg, 20 mg, Oral, Daily, Radha Steen MD, 20 mg at 01/10/24 0804    famotidine (PEPCID) tablet 40 mg, 40 mg, Oral, Daily, Radha Steen MD, 40 mg at 01/10/24 0804    gabapentin (NEURONTIN) capsule 300 mg, 300 mg, Oral, Daily, Radha Steen MD, 300 mg at 01/10/24 1344    heparin (porcine) subcutaneous injection 5,000 Units, 5,000 Units, Subcutaneous, Q8H [facility], 5,000 Units at 01/11/24 0513    hydrALAZINE (APRESOLINE) injection 10 mg, 10 mg, Intravenous, Q4H PRN, Radha Steen MD, 10 mg at 01/11/24 0121    hydrALAZINE (APRESOLINE) tablet 50 mg, 50 mg, Oral, TID, Radha Steen MD, 50 mg at 01/10/24 2017    HYDROmorphone HCl (DILAUDID) injection 0.2 mg, 0.2 mg, Intravenous, Q4H PRN, Radha Steen MD, 0.2 mg at 01/09/24 1805    insulin lispro (HumaLOG) FOR PUMP REFILLS 300 Units, 300 Units, Subcutaneous Insulin Pump, Daily PRN, Radha Steen MD    labetalol (NORMODYNE) injection 10 mg, 10 mg, Intravenous, Q6H PRN, Radha Steen MD, 10 mg at 01/11/24 0010    labetalol (NORMODYNE) tablet 800 mg, 800 mg, Oral, TID, Radha Steen MD, 800 mg at 01/10/24 2017     lisinopril (ZESTRIL) tablet 40 mg, 40 mg, Oral, BID, Radha Steen MD, 40 mg at 01/10/24 1742    metoclopramide (REGLAN) injection 10 mg, 10 mg, Intravenous, Q6H PRN, Radha Steen MD, 10 mg at 01/10/24 1636    NIFEdipine (PROCARDIA XL) 24 hr tablet 90 mg, 90 mg, Oral, HS, Radha Steen MD, 90 mg at 01/10/24 2233    niMODipine (NIMOTOP) capsule 60 mg, 60 mg, Oral, Q4H HALIE, Radha Steen MD, 60 mg at 01/11/24 0513    ondansetron (ZOFRAN) injection 4 mg, 4 mg, Intravenous, Q4H PRN, Radha Steen MD, 4 mg at 01/08/24 1153    oxyCODONE (ROXICODONE) split tablet 2.5 mg, 2.5 mg, Oral, Q4H PRN, 2.5 mg at 01/10/24 1742 **OR** oxyCODONE (ROXICODONE) IR tablet 5 mg, 5 mg, Oral, Q4H PRN, Radha Steen MD, 5 mg at 01/09/24 1558    PATIENT MAINTAINED INSULIN PUMP 1 each, 1 each, Subcutaneous, Q8H, Radha Steen MD, 1 each at 01/10/24 2300    polyethylene glycol (MIRALAX) packet 17 g, 17 g, Oral, Daily, Radha Steen MD, 17 g at 01/10/24 0805    prochlorperazine (COMPAZINE) tablet 5 mg, 5 mg, Oral, Q6H PRN, Radha Steen MD, 5 mg at 01/06/24 1217    senna-docusate sodium (SENOKOT S) 8.6-50 mg per tablet 1 tablet, 1 tablet, Oral, BID, Radha Steen MD, 1 tablet at 01/10/24 1742    Laboratory Results:  Results from last 7 days   Lab Units 01/10/24  0557 01/09/24  0436 01/08/24  0654 01/07/24  0541 01/06/24  0527 01/05/24  0548 01/04/24  2243   WBC Thousand/uL 6.49 10.48* 13.04* 9.89 10.25*  --  8.46   HEMOGLOBIN g/dL 8.2* 9.4* 10.3* 9.1* 9.6*  --  11.5*   HEMATOCRIT % 24.9* 27.9* 30.1* 27.8* 29.2*  --  34.3*   PLATELETS Thousands/uL 192 220 217 195 208  --  210   POTASSIUM mmol/L 4.4 4.3 5.2 4.2 4.9 4.7 5.3   CHLORIDE mmol/L 92* 94* 93* 97 95*  --  91*   CO2 mmol/L 23 25 19* 25 26  --  27   BUN mg/dL 37* 41* 62* 45* 38*  --  56*   CREATININE mg/dL 8.44* 9.84* 14.28* 12.62* 10.27*  --  13.49*   CALCIUM mg/dL 8.6 8.5 8.3* 7.8* 9.1  --  9.0   MAGNESIUM mg/dL 2.2 2.1 2.3 2.1 2.2  --   --    PHOSPHORUS mg/dL 5.7* 6.2* 6.1* 6.0* 6.1*  --   --        Portions of the record may  "have been created with voice recognition software. Occasional wrong word or \"sound a like\" substitutions may have occurred due to the inherent limitations of voice recognition software. Read the chart carefully and recognize, using context, where substitutions have occurred. If you have any questions, please contact the dictating provider.    "

## 2024-01-11 NOTE — ASSESSMENT & PLAN NOTE
Lab Results   Component Value Date    EGFR 9 01/11/2024    EGFR 7 01/10/2024    EGFR 5 01/09/2024    CREATININE 6.69 (H) 01/11/2024    CREATININE 8.44 (H) 01/10/2024    CREATININE 9.84 (H) 01/09/2024     On HD (MWF)

## 2024-01-11 NOTE — PLAN OF CARE
Problem: PAIN - ADULT  Goal: Verbalizes/displays adequate comfort level or baseline comfort level  Description: Interventions:  - Encourage patient to monitor pain and request assistance  - Assess pain using appropriate pain scale  - Administer analgesics based on type and severity of pain and evaluate response  - Implement non-pharmacological measures as appropriate and evaluate response  - Consider cultural and social influences on pain and pain management  - Notify physician/advanced practitioner if interventions unsuccessful or patient reports new pain  Outcome: Progressing     Problem: INFECTION - ADULT  Goal: Absence or prevention of progression during hospitalization  Description: INTERVENTIONS:  - Assess and monitor for signs and symptoms of infection  - Monitor lab/diagnostic results  - Monitor all insertion sites, i.e. indwelling lines, tubes, and drains  - Monitor endotracheal if appropriate and nasal secretions for changes in amount and color  - [place] appropriate cooling/warming therapies per order  - Administer medications as ordered  - Instruct and encourage patient and family to use good hand hygiene technique  - Identify and instruct in appropriate isolation precautions for identified infection/condition  Outcome: Progressing  Goal: Absence of fever/infection during neutropenic period  Description: INTERVENTIONS:  - Monitor WBC    Outcome: Progressing     Problem: SAFETY ADULT  Goal: Patient will remain free of falls  Description: INTERVENTIONS:  - Educate patient/family on patient safety including physical limitations  - Instruct patient to call for assistance with activity   - Consult OT/PT to assist with strengthening/mobility   - Keep Call bell within reach  - Keep bed low and locked with side rails adjusted as appropriate  - Keep care items and personal belongings within reach  - Initiate and maintain comfort rounds  - Make Fall Risk Sign visible to staff  - Offer Toileting every 2 Hours,  in advance of need  - Initiate/Maintain bed alarm  - Obtain necessary fall risk management equipment: bed alarm  - Apply yellow socks and bracelet for high fall risk patients  - Consider moving patient to room near nurses station  Outcome: Progressing  Goal: Maintain or return to baseline ADL function  Description: INTERVENTIONS:  -  Assess patient's ability to carry out ADLs; assess patient's baseline for ADL function and identify physical deficits which impact ability to perform ADLs (bathing, care of mouth/teeth, toileting, grooming, dressing, etc.)  - Assess/evaluate cause of self-care deficits   - Assess range of motion  - Assess patient's mobility; develop plan if impaired  - Assess patient's need for assistive devices and provide as appropriate  - Encourage maximum independence but intervene and supervise when necessary  - Involve family in performance of ADLs  - Assess for home care needs following discharge   - Consider OT consult to assist with ADL evaluation and planning for discharge  - Provide patient education as appropriate  Outcome: Progressing  Goal: Maintains/Returns to pre admission functional level  Description: INTERVENTIONS:  - Perform AM-PAC 6 Click Basic Mobility/ Daily Activity assessment daily.  - Set and communicate daily mobility goal to care team and patient/family/caregiver.   - Collaborate with rehabilitation services on mobility goals if consulted  - Perform Range of Motion 3 times a day.  - Reposition patient every 2 hours.  - Dangle patient 2 times a day  - Stand patient 2 times a day  - Ambulate patient 2 times a day  - Out of bed to chair 2 times a day   - Out of bed for meals 3 times a day  - Out of bed for toileting  - Record patient progress and toleration of activity level   Outcome: Progressing     Problem: DISCHARGE PLANNING  Goal: Discharge to home or other facility with appropriate resources  Description: INTERVENTIONS:  - Identify barriers to discharge w/patient and  caregiver  - Arrange for needed discharge resources and transportation as appropriate  - Identify discharge learning needs (meds, wound care, etc.)  - Arrange for interpretive services to assist at discharge as needed  - Refer to Case Management Department for coordinating discharge planning if the patient needs post-hospital services based on physician/advanced practitioner order or complex needs related to functional status, cognitive ability, or social support system  Outcome: Progressing     Problem: Knowledge Deficit  Goal: Patient/family/caregiver demonstrates understanding of disease process, treatment plan, medications, and discharge instructions  Description: Complete learning assessment and assess knowledge base.  Interventions:  - Provide teaching at level of understanding  - Provide teaching via preferred learning methods  Outcome: Progressing     Problem: Nutrition/Hydration-ADULT  Goal: Nutrient/Hydration intake appropriate for improving, restoring or maintaining nutritional needs  Description: Monitor and assess patient's nutrition/hydration status for malnutrition. Collaborate with interdisciplinary team and initiate plan and interventions as ordered.  Monitor patient's weight and dietary intake as ordered or per policy. Utilize nutrition screening tool and intervene as necessary. Determine patient's food preferences and provide high-protein, high-caloric foods as appropriate.     INTERVENTIONS:  - Monitor oral intake, urinary output, labs, and treatment plans  - Assess nutrition and hydration status and recommend course of action  - Evaluate amount of meals eaten  - Assist patient with eating if necessary   - Allow adequate time for meals  - Recommend/ encourage appropriate diets, oral nutritional supplements, and vitamin/mineral supplements  - Order, calculate, and assess calorie counts as needed  - Recommend, monitor, and adjust tube feedings and TPN/PPN based on assessed needs  - Assess need for  intravenous fluids  - Provide specific nutrition/hydration education as appropriate  - Include patient/family/caregiver in decisions related to nutrition  Outcome: Progressing     Problem: METABOLIC, FLUID AND ELECTROLYTES - ADULT  Goal: Electrolytes maintained within normal limits  Description: INTERVENTIONS:  - Monitor labs and assess patient for signs and symptoms of electrolyte imbalances  - Administer electrolyte replacement as ordered  - Monitor response to electrolyte replacements, including repeat lab results as appropriate  - Instruct patient on fluid and nutrition as appropriate  Outcome: Progressing  Goal: Fluid balance maintained  Description: INTERVENTIONS:  - Monitor labs   - Monitor I/O and WT  - Instruct patient on fluid and nutrition as appropriate  - Assess for signs & symptoms of volume excess or deficit  Outcome: Progressing     Problem: Prexisting or High Potential for Compromised Skin Integrity  Goal: Skin integrity is maintained or improved  Description: INTERVENTIONS:  - Identify patients at risk for skin breakdown  - Assess and monitor skin integrity  - Assess and monitor nutrition and hydration status  - Monitor labs   - Assess for incontinence   - Turn and reposition patient  - Assist with mobility/ambulation  - Relieve pressure over bony prominences  - Avoid friction and shearing  - Provide appropriate hygiene as needed including keeping skin clean and dry  - Evaluate need for skin moisturizer/barrier cream  - Collaborate with interdisciplinary team   - Patient/family teaching  - Consider wound care consult   Outcome: Progressing

## 2024-01-11 NOTE — RESTORATIVE TECHNICIAN NOTE
Restorative Technician Note      Patient Name: Mateus Mckeon     Note Type: Mobility (Pt refused OOB/ambulation 2* c/o not sleeping much/very tired nursing aware.)      Savi Plunkett BS, Restorative Technician,

## 2024-01-11 NOTE — PHYSICAL THERAPY NOTE
PHYSICAL THERAPY NOTE          Patient Name: Mateus Mckeon  Today's Date: 1/11/2024 01/11/24 1432   PT Last Visit   PT Visit Date 01/11/24   Note Type   Note Type Treatment   Pain Assessment   Pain Assessment Tool 0-10   Pain Score No Pain   Restrictions/Precautions   Weight Bearing Precautions Per Order No   Other Precautions Cognitive;Chair Alarm;Bed Alarm;Multiple lines;Telemetry;Fall Risk   General   Chart Reviewed Yes   Response to Previous Treatment Patient with no complaints from previous session.   Family/Caregiver Present No   Cognition   Overall Cognitive Status WFL   Arousal/Participation Responsive   Attention Attends with cues to redirect   Orientation Level Oriented X4   Memory Within functional limits   Following Commands Follows multistep commands without difficulty   Subjective   Subjective agreeable   Bed Mobility   Supine to Sit 5  Supervision   Additional items Increased time required;Verbal cues   Sit to Supine Unable to assess   Transfers   Sit to Stand 4  Minimal assistance   Additional items Assist x 1;Increased time required   Stand to Sit 4  Minimal assistance   Additional items Assist x 1;Increased time required   Additional Comments c RW   Ambulation/Elevation   Gait pattern Improper Weight shift;Decreased foot clearance;Short stride;Excessively slow   Gait Assistance 4  Minimal assist   Additional items Assist x 1;Verbal cues   Assistive Device Rolling walker   Distance 40'x2   Balance   Static Sitting Fair +   Dynamic Sitting Fair   Static Standing Fair -   Dynamic Standing Poor +   Ambulatory Poor +   Endurance Deficit   Endurance Deficit Yes   Endurance Deficit Description fatigue   Activity Tolerance   Activity Tolerance Patient limited by fatigue   Medical Staff Made Aware JANNETH DUNLAP   Nurse Made Aware yes-cleared   Assessment   Prognosis Fair   Problem List Decreased strength;Decreased  endurance;Impaired balance;Decreased mobility;Decreased cognition;Impaired judgement   Assessment Pt agreeable to participate in PT session. Pt performed functional mobility as outlined above. Encouraged pt to have appropriate sleep/wake cycles as he has been sleeping most of day. Heavy UE use on RW and encouraged more LE weight bearing. Pt left seated in chair with chair alarm, call bell, phone, and all personal needs within reach. Pt will continue to benefit from skilled acute care PT to further address their functional mobility limitations.   Barriers to Discharge Inaccessible home environment   Goals   Patient Goals to rest   STG Expiration Date 01/21/24   PT Treatment Day 2   Plan   Treatment/Interventions Functional transfer training;LE strengthening/ROM;Therapeutic exercise;Endurance training;Patient/family training;Equipment eval/education;Bed mobility;Gait training;Elevations;Spoke to nursing;Spoke to case management;OT   Progress Progressing toward goals   PT Frequency 3-5x/wk   Discharge Recommendation   Rehab Resource Intensity Level, PT III (Minimum Resource Intensity)   Equipment Recommended Walker   Walker Package Recommended Wheeled walker   AM-PAC Basic Mobility Inpatient   Turning in Flat Bed Without Bedrails 3   Lying on Back to Sitting on Edge of Flat Bed Without Bedrails 3   Moving Bed to Chair 3   Standing Up From Chair Using Arms 3   Walk in Room 3   Climb 3-5 Stairs With Railing 3   Basic Mobility Inpatient Raw Score 18   Basic Mobility Standardized Score 41.05   Highest Level Of Mobility   JH-HLM Goal 6: Walk 10 steps or more   JH-HLM Achieved 7: Walk 25 feet or more   Elliot Mclaughlin, PT, DPT

## 2024-01-11 NOTE — ASSESSMENT & PLAN NOTE
S/p Cerebral angiography on 1/5/24 by Dr. Mittal-  angio negative.   Right basilar cistern SAH, BD 11, HH 2, MF 3.  P/w HA that started on 12/31/23, 5 days prior to admission on 1/4/24.   Hx ASA use for hx multiple strokes, reversed with DDAVP.  MRIs negative for source of SAH, small scattered acute/subacute infarcts noted.   Pt on hemodialysis for CKD.   On current exam, c/o headache, otherwise non-focal. GCS 15.     Imaging:  CT head wo 1/9/24: Improving subarachnoid hemorrhage compared to recent prior studies. No new findings.   MRI brain wo 1/8/24:Stable hematoma in the right CP angle, prepontine and premedullary cisterns. New subarachnoid hemorrhage over the convexities and within the lateral ventricles likely due to redistribution.Few tiny scattered recent infarcts in multiple vascular distributions.  MRI cervical spine wo 1/8/24:No identifiable etiology for subarachnoid hemorrhage.Mild congenital canal stenosis.New marrow edema at C5-C6 with severe disc space narrowing that is similar to recent CT. Modic type I endplate degenerative changes favored over infection but recommend clinical correlation and follow-up imaging if warranted.    Plan:  Continue to monitor neuro exam closely.  STAT CTA with decline in GCS > 2 pts in 1 hour.  Maintain SBP < 160 mmHg.  Pt with hx of strokes. Home ASA on hold due to acute SAH.   Will maintain on SAH pathway at this time.   Spasm watch  Daily TCDs: R 1.74, L 1.0  Continue Nimodipine 60mg Q4H  Normonatremia (today 138), euvolemia ( +540 ml/24H), Hgb >8 (1/10/24: 8.2)   SBP<160 mmHg  Mobilize as tolerated.  DVT ppx: SCD's,  SQH.  Possible Cerebral angiography on Friday 1/12/24 by Dr. Mittal vs CTA.     Neurosurgery will continue to follow. Call with questions.

## 2024-01-11 NOTE — PLAN OF CARE
Problem: PAIN - ADULT  Goal: Verbalizes/displays adequate comfort level or baseline comfort level  Description: Interventions:  - Encourage patient to monitor pain and request assistance  - Assess pain using appropriate pain scale  - Administer analgesics based on type and severity of pain and evaluate response  - Implement non-pharmacological measures as appropriate and evaluate response  - Consider cultural and social influences on pain and pain management  - Notify physician/advanced practitioner if interventions unsuccessful or patient reports new pain  Outcome: Progressing     Problem: INFECTION - ADULT  Goal: Absence or prevention of progression during hospitalization  Description: INTERVENTIONS:  - Assess and monitor for signs and symptoms of infection  - Monitor lab/diagnostic results  - Monitor all insertion sites, i.e. indwelling lines, tubes, and drains  - Monitor endotracheal if appropriate and nasal secretions for changes in amount and color  - [place] appropriate cooling/warming therapies per order  - Administer medications as ordered  - Instruct and encourage patient and family to use good hand hygiene technique  - Identify and instruct in appropriate isolation precautions for identified infection/condition  Outcome: Progressing  Goal: Absence of fever/infection during neutropenic period  Description: INTERVENTIONS:  - Monitor WBC    Outcome: Progressing     Problem: SAFETY ADULT  Goal: Patient will remain free of falls  Description: INTERVENTIONS:  - Educate patient/family on patient safety including physical limitations  - Instruct patient to call for assistance with activity   - Consult OT/PT to assist with strengthening/mobility   - Keep Call bell within reach  - Keep bed low and locked with side rails adjusted as appropriate  - Keep care items and personal belongings within reach  - Initiate and maintain comfort rounds  - Make Fall Risk Sign visible to staff  - Offer Toileting every 2 Hours,  in advance of need  - Apply yellow socks and bracelet for high fall risk patients  - Consider moving patient to room near nurses station  Outcome: Progressing  Goal: Maintain or return to baseline ADL function  Description: INTERVENTIONS:  -  Assess patient's ability to carry out ADLs; assess patient's baseline for ADL function and identify physical deficits which impact ability to perform ADLs (bathing, care of mouth/teeth, toileting, grooming, dressing, etc.)  - Assess/evaluate cause of self-care deficits   - Assess range of motion  - Assess patient's mobility; develop plan if impaired  - Assess patient's need for assistive devices and provide as appropriate  - Encourage maximum independence but intervene and supervise when necessary  - Involve family in performance of ADLs  - Assess for home care needs following discharge   - Consider OT consult to assist with ADL evaluation and planning for discharge  - Provide patient education as appropriate  Outcome: Progressing  Goal: Maintains/Returns to pre admission functional level  Description: INTERVENTIONS:  - Perform AM-PAC 6 Click Basic Mobility/ Daily Activity assessment daily.  - Set and communicate daily mobility goal to care team and patient/family/caregiver.   - Collaborate with rehabilitation services on mobility goals if consulted  - Perform Range of Motion 2 times a day.  - Reposition patient every 2 hours.  - Dangle patient 2 times a day  - Stand patient 2 times a day  - Ambulate patient 2 times a day  - Out of bed to chair 2 times a day   - Out of bed for meals 2 times a day  - Out of bed for toileting  - Record patient progress and toleration of activity level   Outcome: Progressing     Problem: DISCHARGE PLANNING  Goal: Discharge to home or other facility with appropriate resources  Description: INTERVENTIONS:  - Identify barriers to discharge w/patient and caregiver  - Arrange for needed discharge resources and transportation as appropriate  - Identify  discharge learning needs (meds, wound care, etc.)  - Arrange for interpretive services to assist at discharge as needed  - Refer to Case Management Department for coordinating discharge planning if the patient needs post-hospital services based on physician/advanced practitioner order or complex needs related to functional status, cognitive ability, or social support system  Outcome: Progressing     Problem: Knowledge Deficit  Goal: Patient/family/caregiver demonstrates understanding of disease process, treatment plan, medications, and discharge instructions  Description: Complete learning assessment and assess knowledge base.  Interventions:  - Provide teaching at level of understanding  - Provide teaching via preferred learning methods  Outcome: Progressing     Problem: Nutrition/Hydration-ADULT  Goal: Nutrient/Hydration intake appropriate for improving, restoring or maintaining nutritional needs  Description: Monitor and assess patient's nutrition/hydration status for malnutrition. Collaborate with interdisciplinary team and initiate plan and interventions as ordered.  Monitor patient's weight and dietary intake as ordered or per policy. Utilize nutrition screening tool and intervene as necessary. Determine patient's food preferences and provide high-protein, high-caloric foods as appropriate.     INTERVENTIONS:  - Monitor oral intake, urinary output, labs, and treatment plans  - Assess nutrition and hydration status and recommend course of action  - Evaluate amount of meals eaten  - Assist patient with eating if necessary   - Allow adequate time for meals  - Recommend/ encourage appropriate diets, oral nutritional supplements, and vitamin/mineral supplements  - Order, calculate, and assess calorie counts as needed  - Recommend, monitor, and adjust tube feedings and TPN/PPN based on assessed needs  - Assess need for intravenous fluids  - Provide specific nutrition/hydration education as appropriate  - Include  patient/family/caregiver in decisions related to nutrition  Outcome: Progressing     Problem: METABOLIC, FLUID AND ELECTROLYTES - ADULT  Goal: Electrolytes maintained within normal limits  Description: INTERVENTIONS:  - Monitor labs and assess patient for signs and symptoms of electrolyte imbalances  - Administer electrolyte replacement as ordered  - Monitor response to electrolyte replacements, including repeat lab results as appropriate  - Instruct patient on fluid and nutrition as appropriate  Outcome: Progressing  Goal: Fluid balance maintained  Description: INTERVENTIONS:  - Monitor labs   - Monitor I/O and WT  - Instruct patient on fluid and nutrition as appropriate  - Assess for signs & symptoms of volume excess or deficit  Outcome: Progressing     Problem: Prexisting or High Potential for Compromised Skin Integrity  Goal: Skin integrity is maintained or improved  Description: INTERVENTIONS:  - Identify patients at risk for skin breakdown  - Assess and monitor skin integrity  - Assess and monitor nutrition and hydration status  - Monitor labs   - Assess for incontinence   - Turn and reposition patient  - Assist with mobility/ambulation  - Relieve pressure over bony prominences  - Avoid friction and shearing  - Provide appropriate hygiene as needed including keeping skin clean and dry  - Evaluate need for skin moisturizer/barrier cream  - Collaborate with interdisciplinary team   - Patient/family teaching  - Consider wound care consult   Outcome: Progressing

## 2024-01-11 NOTE — PROGRESS NOTES
[facility]  Progress Note  Name: Mateus Mckeon I  MRN: [id number]  Unit/Bed#: [facility] 718-01 I Date of Admission: 1/5/2024   Date of Service: 1/11/2024 I Hospital Day: 6    Assessment/Plan   * Subarachnoid hemorrhage (HCC)  Assessment & Plan  S/p Cerebral angiography on 1/5/24 by Dr. Mittal-  angio negative.   Right basilar cistern SAH, BD 11, HH 2, MF 3.  P/w HA that started on 12/31/23, 5 days prior to admission on 1/4/24.   Hx ASA use for hx multiple strokes, reversed with DDAVP.  MRIs negative for source of SAH, small scattered acute/subacute infarcts noted.   Pt on hemodialysis for CKD.   On current exam, c/o headache, otherwise non-focal. GCS 15.     Imaging:  CT head wo 1/9/24: Improving subarachnoid hemorrhage compared to recent prior studies. No new findings.   MRI brain wo 1/8/24:Stable hematoma in the right CP angle, prepontine and premedullary cisterns. New subarachnoid hemorrhage over the convexities and within the lateral ventricles likely due to redistribution.Few tiny scattered recent infarcts in multiple vascular distributions.  MRI cervical spine wo 1/8/24:No identifiable etiology for subarachnoid hemorrhage.Mild congenital canal stenosis.New marrow edema at C5-C6 with severe disc space narrowing that is similar to recent CT. Modic type I endplate degenerative changes favored over infection but recommend clinical correlation and follow-up imaging if warranted.    Plan:  Continue to monitor neuro exam closely.  STAT CTA with decline in GCS > 2 pts in 1 hour.  Maintain SBP < 160 mmHg.  Pt with hx of strokes. Home ASA on hold due to acute SAH.   Will maintain on SAH pathway at this time.   Spasm watch  Daily TCDs: R 1.74, L 1.0  Continue Nimodipine 60mg Q4H  Normonatremia (today 138), euvolemia ( +540 ml/24H), Hgb >8 (1/10/24: 8.2)   SBP<160 mmHg  Mobilize as tolerated.  DVT ppx: SCD's,  SQH.  Possible Cerebral angiography on Friday 1/12/24 by Dr. Mittal vs  "CTA.     Neurosurgery will continue to follow. Call with questions.      End stage kidney disease (HCC)  Assessment & Plan  Lab Results   Component Value Date    EGFR 9 01/11/2024    EGFR 7 01/10/2024    EGFR 5 01/09/2024    CREATININE 6.69 (H) 01/11/2024    CREATININE 8.44 (H) 01/10/2024    CREATININE 9.84 (H) 01/09/2024     On HD (MWF)             Subjective/Objective     Chief Complaint: \"My headache is not as bad today\"    Subjective: Pt reports mild headache, dizziness and nausea. He denies vomiting. Patient denies any new numbness, tingling or weakness. He reports chronic numbness in bilateral feet and his fingertips and his hands due to peripheral diabetic neuropathy.  Patient denies any changes in his vision.    Objective: Alert and awake, laying in bed, no acute distress.    I/O         01/09 0701  01/10 0700 01/10 0701 01/11 0700 01/11 0701 01/12 0700    P.O.  540     I.V. (mL/kg) 437.9 (4.5) 500 (5.4)     Total Intake(mL/kg) 437.9 (4.5) 1040 (11.2)     Urine (mL/kg/hr)       Other 500 500     Stool 0 0     Total Output 500 500     Net -62.1 +540            Unmeasured Stool Occurrence 2 x 2 x             Invasive Devices       Peripheral Intravenous Line  Duration             Peripheral IV 01/08/24 Right;Upper;Ventral (anterior) Arm 2 days    Peripheral IV 01/09/24 Dorsal (posterior);Right Hand 2 days              Line  Duration             Hemodialysis Access 11/02/20 Left Forearm 1165 days    Hemodialysis AV Fistula 11/09/20 Left Other (Comment) 1158 days                    Physical Exam:  Vitals: Blood pressure (!) 176/68, pulse 66, temperature 98.8 °F (37.1 °C), resp. rate 15, height 5' 5\" (1.651 m), weight 92.5 kg (203 lb 14.8 oz), SpO2 97%.,Body mass index is 33.93 kg/m².    General appearance:  Appears stated age  Head: Normocephalic, without obvious abnormality, atraumatic  Eyes: EOMI, PERRL  Neck: supple, symmetrical, trachea midline   Lungs: non labored breathing  Heart: regular heart " "rate  Neurologic:   Mental status: Alert, oriented, speech is clear and fluent, thought content appropriate  Cranial nerves: grossly intact (Cranial nerves II-XII)  Sensory: normal to LT x 4 except decreased in bilateral feet and fingertips.   Motor: moving all extremities, strength 5/5 throughout.   Coordination: no drift in bilateral upper extremities      Lab Results:  Results from last 7 days   Lab Units 01/10/24  0557 01/09/24  0436 01/08/24  0654   WBC Thousand/uL 6.49 10.48* 13.04*   HEMOGLOBIN g/dL 8.2* 9.4* 10.3*   HEMATOCRIT % 24.9* 27.9* 30.1*   PLATELETS Thousands/uL 192 220 217   NEUTROS PCT % 76* 83* 80*   MONOS PCT % 9 6 6   EOS PCT % 0 0 3     Results from last 7 days   Lab Units 01/11/24  0522 01/10/24  0557 01/09/24  0436 01/05/24  0548 01/04/24  2243   POTASSIUM mmol/L 4.1 4.4 4.3   < > 5.3   CHLORIDE mmol/L 99 92* 94*   < > 91*   CO2 mmol/L 28 23 25   < > 27   BUN mg/dL 25 37* 41*   < > 56*   CREATININE mg/dL 6.69* 8.44* 9.84*   < > 13.49*   CALCIUM mg/dL 8.2* 8.6 8.5   < > 9.0   ALK PHOS U/L 64  --   --   --  79   ALT U/L 9  --   --   --  10   AST U/L 13  --   --   --  15    < > = values in this interval not displayed.     Results from last 7 days   Lab Units 01/10/24  0557 01/09/24  0436 01/08/24  0654   MAGNESIUM mg/dL 2.2 2.1 2.3     Results from last 7 days   Lab Units 01/10/24  0557 01/09/24  0436 01/08/24  0654   PHOSPHORUS mg/dL 5.7* 6.2* 6.1*     Results from last 7 days   Lab Units 01/05/24  0614 01/05/24  0351   INR   --  1.04   PTT seconds 29 30     No results found for: \"TROPONINT\"  ABG:  Lab Results   Component Value Date    PHART 7.454 (H) 02/21/2020    PMK4VHS 33.2 (L) 02/21/2020    PO2ART 170.0 (H) 02/21/2020    EMR0JPS 22.8 02/21/2020    BEART -0.8 02/21/2020    SOURCE Radial, Right 02/21/2020       Imaging Studies: I have personally reviewed pertinent reports and I have personally reviewed pertinent films in PACS    EKG, Pathology, and Other Studies: I have personally reviewed " pertinent reports.      PLEASE NOTE:  This encounter may have been completed utilizing the [employer] Direct Speech Voice Recognition Software. Grammatical errors, random word insertions, pronoun errors and incomplete sentences are occasional consequences of the system due to software limitations, ambient noise and hardware issues.These may be missed by proof reading prior to affixing electronic signature. Any questions or concerns about the content, text or information contained within the body of this dictation should be directly addressed to the advanced practitioner or physician for clarification.

## 2024-01-11 NOTE — PLAN OF CARE
Problem: PHYSICAL THERAPY ADULT  Goal: Performs mobility at highest level of function for planned discharge setting.  See evaluation for individualized goals.  Description: Treatment/Interventions: Functional transfer training, LE strengthening/ROM, Therapeutic exercise, Endurance training, Elevations, Cognitive reorientation, Patient/family training, Equipment eval/education, Bed mobility, Gait training, Spoke to nursing, Spoke to case management, OT, Family          See flowsheet documentation for full assessment, interventions and recommendations.  Outcome: Progressing  Note: Prognosis: Fair  Problem List: Decreased strength, Decreased endurance, Impaired balance, Decreased mobility, Decreased cognition, Impaired judgement  Assessment: Pt agreeable to participate in PT session. Pt performed functional mobility as outlined above. Encouraged pt to have appropriate sleep/wake cycles as he has been sleeping most of day. Heavy UE use on RW and encouraged more LE weight bearing. Pt left seated in chair with chair alarm, call bell, phone, and all personal needs within reach. Pt will continue to benefit from skilled acute care PT to further address their functional mobility limitations.  Barriers to Discharge: Inaccessible home environment     Rehab Resource Intensity Level, PT: III (Minimum Resource Intensity)    See flowsheet documentation for full assessment.

## 2024-01-12 ENCOUNTER — APPOINTMENT (INPATIENT)
Dept: NON INVASIVE DIAGNOSTICS | Facility: HOSPITAL | Age: 39
DRG: 064 | End: 2024-01-12
Payer: MEDICARE

## 2024-01-12 ENCOUNTER — APPOINTMENT (INPATIENT)
Dept: DIALYSIS | Facility: HOSPITAL | Age: 39
DRG: 064 | End: 2024-01-12
Payer: MEDICARE

## 2024-01-12 ENCOUNTER — ANESTHESIA (INPATIENT)
Dept: RADIOLOGY | Facility: HOSPITAL | Age: 39
End: 2024-01-12
Payer: MEDICARE

## 2024-01-12 ENCOUNTER — APPOINTMENT (INPATIENT)
Dept: RADIOLOGY | Facility: HOSPITAL | Age: 39
DRG: 064 | End: 2024-01-12
Payer: MEDICARE

## 2024-01-12 ENCOUNTER — ANESTHESIA EVENT (INPATIENT)
Dept: RADIOLOGY | Facility: HOSPITAL | Age: 39
End: 2024-01-12
Payer: MEDICARE

## 2024-01-12 LAB
ANION GAP SERPL CALCULATED.3IONS-SCNC: 13 MMOL/L
APTT PPP: 30 SECONDS (ref 23–37)
BUN SERPL-MCNC: 37 MG/DL (ref 5–25)
CALCIUM SERPL-MCNC: 8.3 MG/DL (ref 8.4–10.2)
CHLORIDE SERPL-SCNC: 97 MMOL/L (ref 96–108)
CO2 SERPL-SCNC: 26 MMOL/L (ref 21–32)
CREAT SERPL-MCNC: 8.79 MG/DL (ref 0.6–1.3)
ERYTHROCYTE [DISTWIDTH] IN BLOOD BY AUTOMATED COUNT: 14 % (ref 11.6–15.1)
GFR SERPL CREATININE-BSD FRML MDRD: 6 ML/MIN/1.73SQ M
GLUCOSE SERPL-MCNC: 133 MG/DL (ref 65–140)
GLUCOSE SERPL-MCNC: 145 MG/DL (ref 65–140)
GLUCOSE SERPL-MCNC: 182 MG/DL (ref 65–140)
GLUCOSE SERPL-MCNC: 187 MG/DL (ref 65–140)
GLUCOSE SERPL-MCNC: 98 MG/DL (ref 65–140)
HCT VFR BLD AUTO: 28.4 % (ref 36.5–49.3)
HGB BLD-MCNC: 9.4 G/DL (ref 12–17)
INR PPP: 1.05 (ref 0.84–1.19)
MCH RBC QN AUTO: 31.1 PG (ref 26.8–34.3)
MCHC RBC AUTO-ENTMCNC: 33.1 G/DL (ref 31.4–37.4)
MCV RBC AUTO: 94 FL (ref 82–98)
PLATELET # BLD AUTO: 260 THOUSANDS/UL (ref 149–390)
PMV BLD AUTO: 10.9 FL (ref 8.9–12.7)
POTASSIUM SERPL-SCNC: 4.4 MMOL/L (ref 3.5–5.3)
PROTHROMBIN TIME: 13.6 SECONDS (ref 11.6–14.5)
RBC # BLD AUTO: 3.02 MILLION/UL (ref 3.88–5.62)
SODIUM SERPL-SCNC: 136 MMOL/L (ref 135–147)
WBC # BLD AUTO: 7.25 THOUSAND/UL (ref 4.31–10.16)

## 2024-01-12 PROCEDURE — 85027 COMPLETE CBC AUTOMATED: CPT | Performed by: INTERNAL MEDICINE

## 2024-01-12 PROCEDURE — 80048 BASIC METABOLIC PNL TOTAL CA: CPT | Performed by: INTERNAL MEDICINE

## 2024-01-12 PROCEDURE — 36226 PLACE CATH VERTEBRAL ART: CPT

## 2024-01-12 PROCEDURE — 76937 US GUIDE VASCULAR ACCESS: CPT

## 2024-01-12 PROCEDURE — 85610 PROTHROMBIN TIME: CPT | Performed by: PHYSICIAN ASSISTANT

## 2024-01-12 PROCEDURE — B30HZZZ PLAIN RADIOGRAPHY OF RIGHT UPPER EXTREMITY ARTERIES: ICD-10-PCS | Performed by: NEUROLOGICAL SURGERY

## 2024-01-12 PROCEDURE — 82948 REAGENT STRIP/BLOOD GLUCOSE: CPT

## 2024-01-12 PROCEDURE — 36226 PLACE CATH VERTEBRAL ART: CPT | Performed by: NEUROLOGICAL SURGERY

## 2024-01-12 PROCEDURE — B305ZZZ PLAIN RADIOGRAPHY OF BILATERAL COMMON CAROTID ARTERIES: ICD-10-PCS | Performed by: NEUROLOGICAL SURGERY

## 2024-01-12 PROCEDURE — 85730 THROMBOPLASTIN TIME PARTIAL: CPT | Performed by: PHYSICIAN ASSISTANT

## 2024-01-12 PROCEDURE — 94760 N-INVAS EAR/PLS OXIMETRY 1: CPT

## 2024-01-12 PROCEDURE — B308ZZZ PLAIN RADIOGRAPHY OF BILATERAL INTERNAL CAROTID ARTERIES: ICD-10-PCS | Performed by: NEUROLOGICAL SURGERY

## 2024-01-12 PROCEDURE — 99232 SBSQ HOSP IP/OBS MODERATE 35: CPT | Performed by: INTERNAL MEDICINE

## 2024-01-12 PROCEDURE — NC001 PR NO CHARGE: Performed by: NEUROLOGICAL SURGERY

## 2024-01-12 PROCEDURE — C1894 INTRO/SHEATH, NON-LASER: HCPCS

## 2024-01-12 PROCEDURE — C1887 CATHETER, GUIDING: HCPCS

## 2024-01-12 PROCEDURE — C1769 GUIDE WIRE: HCPCS

## 2024-01-12 PROCEDURE — B30DZZZ PLAIN RADIOGRAPHY OF RIGHT VERTEBRAL ARTERY: ICD-10-PCS | Performed by: NEUROLOGICAL SURGERY

## 2024-01-12 PROCEDURE — 36224 PLACE CATH CAROTD ART: CPT

## 2024-01-12 PROCEDURE — 36224 PLACE CATH CAROTD ART: CPT | Performed by: NEUROLOGICAL SURGERY

## 2024-01-12 RX ORDER — LIDOCAINE HYDROCHLORIDE 10 MG/ML
INJECTION, SOLUTION EPIDURAL; INFILTRATION; INTRACAUDAL; PERINEURAL AS NEEDED
Status: COMPLETED | OUTPATIENT
Start: 2024-01-12 | End: 2024-01-12

## 2024-01-12 RX ORDER — NITROGLYCERIN 20 MG/100ML
INJECTION INTRAVENOUS AS NEEDED
Status: COMPLETED | OUTPATIENT
Start: 2024-01-12 | End: 2024-01-12

## 2024-01-12 RX ORDER — IODIXANOL 320 MG/ML
200 INJECTION, SOLUTION INTRAVASCULAR
Status: COMPLETED | OUTPATIENT
Start: 2024-01-12 | End: 2024-01-12

## 2024-01-12 RX ORDER — SODIUM CHLORIDE 9 MG/ML
INJECTION, SOLUTION INTRAVENOUS CONTINUOUS PRN
Status: DISCONTINUED | OUTPATIENT
Start: 2024-01-12 | End: 2024-01-12

## 2024-01-12 RX ORDER — ASPIRIN 81 MG/1
81 TABLET, CHEWABLE ORAL DAILY
Status: DISCONTINUED | OUTPATIENT
Start: 2024-01-12 | End: 2024-01-23 | Stop reason: HOSPADM

## 2024-01-12 RX ORDER — VERAPAMIL HYDROCHLORIDE 2.5 MG/ML
INJECTION, SOLUTION INTRAVENOUS AS NEEDED
Status: COMPLETED | OUTPATIENT
Start: 2024-01-12 | End: 2024-01-12

## 2024-01-12 RX ORDER — PROPOFOL 10 MG/ML
INJECTION, EMULSION INTRAVENOUS CONTINUOUS PRN
Status: DISCONTINUED | OUTPATIENT
Start: 2024-01-12 | End: 2024-01-12

## 2024-01-12 RX ORDER — HEPARIN SODIUM 1000 [USP'U]/ML
INJECTION, SOLUTION INTRAVENOUS; SUBCUTANEOUS AS NEEDED
Status: COMPLETED | OUTPATIENT
Start: 2024-01-12 | End: 2024-01-12

## 2024-01-12 RX ADMIN — ACETAMINOPHEN 975 MG: 325 TABLET, FILM COATED ORAL at 06:09

## 2024-01-12 RX ADMIN — HYDRALAZINE HYDROCHLORIDE 100 MG: 50 TABLET, FILM COATED ORAL at 19:02

## 2024-01-12 RX ADMIN — IODIXANOL 80 ML: 320 INJECTION, SOLUTION INTRAVASCULAR at 12:19

## 2024-01-12 RX ADMIN — CLONIDINE 0.3 MG: 0.3 PATCH, EXTENDED RELEASE TRANSDERMAL at 19:11

## 2024-01-12 RX ADMIN — LISINOPRIL 40 MG: 20 TABLET ORAL at 14:09

## 2024-01-12 RX ADMIN — Medication 600 MCG: at 12:21

## 2024-01-12 RX ADMIN — SENNOSIDES, DOCUSATE SODIUM 1 TABLET: 8.6; 5 TABLET ORAL at 14:38

## 2024-01-12 RX ADMIN — LABETALOL HYDROCHLORIDE 800 MG: 200 TABLET, FILM COATED ORAL at 14:09

## 2024-01-12 RX ADMIN — PROPOFOL 40 MCG/KG/MIN: 10 INJECTION, EMULSION INTRAVENOUS at 12:09

## 2024-01-12 RX ADMIN — OXYCODONE HYDROCHLORIDE 5 MG: 5 TABLET ORAL at 14:38

## 2024-01-12 RX ADMIN — LIDOCAINE HYDROCHLORIDE 3 ML: 10 INJECTION, SOLUTION EPIDURAL; INFILTRATION; INTRACAUDAL; PERINEURAL at 12:21

## 2024-01-12 RX ADMIN — VERAPAMIL HYDROCHLORIDE 5 MG: 2.5 INJECTION INTRAVENOUS at 12:24

## 2024-01-12 RX ADMIN — SODIUM CHLORIDE: 9 INJECTION, SOLUTION INTRAVENOUS at 11:57

## 2024-01-12 RX ADMIN — HYDRALAZINE HYDROCHLORIDE 10 MG: 20 INJECTION, SOLUTION INTRAMUSCULAR; INTRAVENOUS at 01:13

## 2024-01-12 RX ADMIN — ACETAMINOPHEN 975 MG: 325 TABLET, FILM COATED ORAL at 14:39

## 2024-01-12 RX ADMIN — HYDRALAZINE HYDROCHLORIDE 100 MG: 50 TABLET, FILM COATED ORAL at 23:34

## 2024-01-12 RX ADMIN — ATORVASTATIN CALCIUM 40 MG: 40 TABLET, FILM COATED ORAL at 19:02

## 2024-01-12 RX ADMIN — ASPIRIN 81 MG CHEWABLE TABLET 81 MG: 81 TABLET CHEWABLE at 19:02

## 2024-01-12 RX ADMIN — CALCIUM ACETATE 667 MG: 667 CAPSULE ORAL at 19:02

## 2024-01-12 RX ADMIN — CINACALCET 60 MG: 30 TABLET ORAL at 14:39

## 2024-01-12 RX ADMIN — ACETAMINOPHEN 975 MG: 325 TABLET, FILM COATED ORAL at 01:06

## 2024-01-12 RX ADMIN — HYDRALAZINE HYDROCHLORIDE 100 MG: 50 TABLET, FILM COATED ORAL at 14:09

## 2024-01-12 RX ADMIN — LABETALOL HYDROCHLORIDE 800 MG: 200 TABLET, FILM COATED ORAL at 19:03

## 2024-01-12 RX ADMIN — NIFEDIPINE 90 MG: 30 TABLET, FILM COATED, EXTENDED RELEASE ORAL at 23:33

## 2024-01-12 RX ADMIN — Medication 400 MCG: at 12:24

## 2024-01-12 RX ADMIN — ESCITALOPRAM OXALATE 20 MG: 20 TABLET ORAL at 14:39

## 2024-01-12 RX ADMIN — GABAPENTIN 300 MG: 300 CAPSULE ORAL at 14:39

## 2024-01-12 RX ADMIN — LIDOCAINE HYDROCHLORIDE 1 ML: 10 INJECTION, SOLUTION EPIDURAL; INFILTRATION; INTRACAUDAL; PERINEURAL at 12:24

## 2024-01-12 RX ADMIN — LABETALOL HYDROCHLORIDE 800 MG: 200 TABLET, FILM COATED ORAL at 23:34

## 2024-01-12 RX ADMIN — ACETAMINOPHEN 975 MG: 325 TABLET, FILM COATED ORAL at 19:02

## 2024-01-12 RX ADMIN — FAMOTIDINE 40 MG: 20 TABLET, FILM COATED ORAL at 14:39

## 2024-01-12 RX ADMIN — SENNOSIDES, DOCUSATE SODIUM 1 TABLET: 8.6; 5 TABLET ORAL at 19:02

## 2024-01-12 RX ADMIN — HEPARIN SODIUM 4000 UNITS: 1000 INJECTION INTRAVENOUS; SUBCUTANEOUS at 12:24

## 2024-01-12 RX ADMIN — CALCIUM ACETATE 667 MG: 667 CAPSULE ORAL at 14:38

## 2024-01-12 RX ADMIN — NIMODIPINE 60 MG: 30 CAPSULE, LIQUID FILLED ORAL at 06:09

## 2024-01-12 RX ADMIN — NIMODIPINE 60 MG: 30 CAPSULE, LIQUID FILLED ORAL at 01:05

## 2024-01-12 NOTE — ASSESSMENT & PLAN NOTE
Right basilar cistern SAH of unclear etiology  BD 12, HH 2, MF 3  S/p Cerebral angiography x 2 - both negative (Dr. Mittal, 1/5/2024, 1/12/2024)  P/w HA that started on 12/31/23, 5 days prior to admission on 1/4/24.   Hx ASA use for hx multiple strokes, reversed with DDAVP.  MRIs negative for source of SAH, small scattered acute/subacute infarcts noted.   Pt on hemodialysis for CKD.     Imaging:  CT head wo 1/9/24: Improving subarachnoid hemorrhage compared to recent prior studies. No new findings.   MRI brain wo 1/8/24:Stable hematoma in the right CP angle, prepontine and premedullary cisterns. New subarachnoid hemorrhage over the convexities and within the lateral ventricles likely due to redistribution.Few tiny scattered recent infarcts in multiple vascular distributions.  MRI cervical spine wo 1/8/24:No identifiable etiology for subarachnoid hemorrhage.Mild congenital canal stenosis.New marrow edema at C5-C6 with severe disc space narrowing that is similar to recent CT. Modic type I endplate degenerative changes favored over infection but recommend clinical correlation and follow-up imaging if warranted.    Plan:  Angio negative x 2 - at this time, can dc SAH pathway.  Will plan for follow up in 6 weeks time with repeat MRI brain w wo contrast and MRA head wo contrast   Continue to monitor neuro exam closely.  GCS 15 with some mild weakness / ataxia of the right hand following procedure.  Will continue to monitor this closely.  STAT CTA with decline in GCS > 2 pts in 1 hour.  Maintain SBP < 160 mmHg.  Pt with hx of strokes. Will resume his home dose asa 81mg today.  Mobilize as tolerated.  DVT ppx: SCD's,  SQH to resume tomorrow.    Neurosurgery will continue to follow. Call with questions.

## 2024-01-12 NOTE — OCCUPATIONAL THERAPY NOTE
Occupational Therapy CX        Patient Name: Mateus Mckeon  Today's Date: 1/12/2024 01/12/24 1003   OT Last Visit   OT Visit Date 01/12/24   Note Type   Note Type Cancelled Session   Cancel Reasons Patient off floor/hemodialysis         Sara Lacy MS, OTR/L

## 2024-01-12 NOTE — PLAN OF CARE
Post-Dialysis RN Treatment Note    Blood Pressure:  Pre 165/99 mm/Hg  Post 196/109 mmHg   EDW  93 kg    Weight:  Pre 93.5 kg   Post 93.2 kg   Mode of weight measurement: Bed Scale   Volume Removed  300 ml    Treatment duration 170 minutes    NS given  No    Treatment shortened? Yes, describe: IR procedure scheduled.   Medications given during Rx None Reported   Estimated Kt/V  0.6   Access type: AV fistula   Access Issues: No    Report called to primary nurse   Yes Nery Lima RN        Goal for today 1.0 kg  Balance of electrolyte levels.    Problem: METABOLIC, FLUID AND ELECTROLYTES - ADULT  Goal: Electrolytes maintained within normal limits  Description: INTERVENTIONS:  - Monitor labs and assess patient for signs and symptoms of electrolyte imbalances  - Administer electrolyte replacement as ordered  - Monitor response to electrolyte replacements, including repeat lab results as appropriate  - Instruct patient on fluid and nutrition as appropriate  Outcome: Progressing  Goal: Fluid balance maintained  Description: INTERVENTIONS:  - Monitor labs   - Monitor I/O and WT  - Instruct patient on fluid and nutrition as appropriate  - Assess for signs & symptoms of volume excess or deficit  Outcome: Progressing

## 2024-01-12 NOTE — ASSESSMENT & PLAN NOTE
Lab Results   Component Value Date    HGBA1C 5.9 (H) 01/07/2024       Recent Labs     01/11/24  1800 01/11/24  2334 01/12/24  0651 01/12/24  1327   POCGLU 205* 186* 187* 98   Managed with insulin pump  Endocrinology following    Blood Sugar Average: Last 72 hrs:  (P) [id number]

## 2024-01-12 NOTE — ASSESSMENT & PLAN NOTE
Monitor BP with clonidine, hydralazine, lisinopril, labetalol, procardia   Prn IV labetalol and hydralazine  Appreciate nephrology

## 2024-01-12 NOTE — DISCHARGE INSTR - AVS FIRST PAGE
Discharge Instructions - Neurosurgery    The following instructions will help you care for yourself, or be cared for upon your return home today. These are guidelines for your care right after your surgery only.      Notify Your Doctor or Nurse if you have any of the following:    SYMPTOMS OF WOUND INFECTION:  Increased pain in or around the incision   Swelling around the incision  Any drainage from the incision  Incision separates or opens up  Warmth in the tissues around the incision  Redness or tenderness on the skin near the incision   Fever (temperature greater than 101 degrees F)     NEUROLOGICAL CHANGES:  Change in alertness  Increased sleepiness   Nausea and vomiting   New onset of numbness or weakness in arms or legs   New problems with your bowels or bladder  New or worse problems with balance or walking  Seizures, new or worsening    UNRELIEVED HEADACHE PAIN:  New or increased pain unrelieved with pain medications   Pain associated with nausea and vomiting   Pain associated with other symptoms    QUESTIONS OR PROBLEMS:  Any questions or problems that you are unsure about    Wound Care:    Keep Incision Clean and Dry   You may shower daily, but do not soak incision. Pat dry after showering.   No tub baths, soaking, swimming for 1 week after angiogram.   You do not need to cover the incision. Mild to moderate bruising and tenderness to the site is expected and may last up to 1-2 weeks after your procedure.     A closure device was placed at the catheter insertion site. This is MRI compatible. Remove the dressing 24 hours after your procedure.     If your groin site is bleeding, apply firm pressure for 10 minutes. Reinforce dressing rather than removing and checking frequently. If continues to bleed through the dressing after 1 hour, contact your neurosurgeon's office.     Anticipatory Education:    PAIN MED W/ Acetaminophen (Tylenol)  --IF a prescription for pain medicine has been sent home with  you:  --Narcotic pain medication may cause constipation. Be sure to take stool softeners or laxatives while you are on narcotic pain medication.   --Do not drive after taking prescription pain medicine.     If this medicine is too strong, or no longer necessary, or we did NOT recommend/prescribe oral narcotics, you may take:   - Tylenol Extra-strength/Acetaminophen, 2 tablets every 4-6 hours as needed for mild pain. DO NOT TAKE MORE THAN 4000MG PER DAY from combined sources. NOTE: Remember to eat when taking pain medicines in order to avoid nausea. Watch for constipation. Eat plenty of fruits, vegetables, juices, and drink 6-8 glasses of water each day.     Constipation: Stay active and drink at least 6-8 cups of fluid each day to prevent constipation. If you need a laxative or stool softener follow the package directions or consult with your local pharmacists if you have questions.    After anesthesia, rest for 24 hours. Do not drive, drink alcohol beverages or make any important decisions during this time. General anesthesia may cause sore throat, jaw discomfort or muscle aches. These symptoms can last for one or two days.    Activity:  Please follow these instructions:  Advance your activity as you can tolerate. You may do light house work; nothing strenuous   You may walk all you want. You may go up and down the steps. Use the railing for support  Do not do excessive bending, straining or heavy lifting for 48 hours after your procedure  Do not drive or return to work until you are instructed   It is normal for your energy level and sleep patterns to change after surgery.   Get extra sleep at night and take naps during the day to help you feel less tired.   Take rest periods during the day.   Complete recovery may take several weeks.    You may resume driving after 24-48 hours recovery.   You may return to work after 48 hours of recovery.     Diet:  Your doctor has recommended that you follow these diet  instructions at home. Refer to the patient education materials you received during your hospital stay. If you would like more nutrition counseling, ask your doctor about making an appointment with an outpatient dietitian.  Resume your home diet    Medications:  Please resume your home medications as instructed.       Home Supplies and Equipment:  none

## 2024-01-12 NOTE — PROGRESS NOTES
[facility]  Progress Note  Name: Mateus Mckeon I  MRN: [id number]  Unit/Bed#: [facility] 718-01 I Date of Admission: 1/5/2024   Date of Service: 1/12/2024 I Hospital Day: 7    Assessment/Plan   * Subarachnoid hemorrhage (HCC)  Assessment & Plan  Presented with significant headaches started since 12/31/2023  CYH 1/5 Right prepontine/premedullary cistern subarachnoid hemorrhage.   S/p Cerebral angiography x 2 - both negative (Dr. Mittal, 1/5/2024, 1/12/2024)4.   On ASA for previous strokes reversed with DDAVP.  MRIs negative for source of SAH, small scattered acute/subacute infarcts noted.   CT head wo 1/9/24: Improving subarachnoid hemorrhage compared to recent prior studies. No new findings.     Plan:  ASA 81 mg daily resumed and Nimodipine discontinued per NSX recs.  No further need for SAH pathway as per Nsx  Continue neurochecks  BP control has been very challenging. Goal of SBP<160      Type 1 diabetes mellitus (HCC)  Assessment & Plan  Lab Results   Component Value Date    HGBA1C 5.9 (H) 01/07/2024       Recent Labs     01/11/24  1800 01/11/24  2334 01/12/24  0651 01/12/24  1327   POCGLU 205* 186* 187* 98   Managed with insulin pump  Endocrinology following          Primary hypertension  Assessment & Plan  Currently on clonidine 0.3mg weekly patch , hydralazine 100mg TID , lisinopril 40mg daily, labetalol 800mg TID, Procardia 90mg qd  Prn IV labetalol and hydralazine  Appreciate nephrology .     Anemia due to chronic kidney disease, on chronic dialysis (HCC)  Assessment & Plan  Lab Results   Component Value Date    EGFR 6 01/12/2024    EGFR 9 01/11/2024    EGFR 7 01/10/2024    CREATININE 8.79 (H) 01/12/2024    CREATININE 6.69 (H) 01/11/2024    CREATININE 8.44 (H) 01/10/2024       End stage kidney disease (HCC)  Assessment & Plan  Dialysis per nephrology     Anemia in chronic kidney disease, on chronic dialysis (HCC)  Assessment & Plan  Lab Results   Component Value Date     EGFR 6 2024    EGFR 9 2024    EGFR 7 01/10/2024    CREATININE 8.79 (H) 2024    CREATININE 6.69 (H) 2024    CREATININE 8.44 (H) 01/10/2024                VTE Pharmacologic Prophylaxis: VTE Score: 7 Moderate Risk (Score 3-4) - Pharmacological DVT Prophylaxis Contraindicated. Sequential Compression Devices Ordered.    Mobility:   Basic Mobility Inpatient Raw Score: 18  JH-HLM Goal: 6: Walk 10 steps or more  JH-HLM Achieved: 6: Walk 10 steps or more  HLM Goal achieved. Continue to encourage appropriate mobility.    Patient Centered Rounds: I performed bedside rounds with nursing staff today.   Discussions with Specialists or Other Care Team Provider: Neurosurgery     Education and Discussions with Family / Patient: Updated  (mother) via phone.    Total Time Spent on Date of Encounter in care of patient: 35 mins. This time was spent on one or more of the following: performing physical exam; counseling and coordination of care; obtaining or reviewing history; documenting in the medical record; reviewing/ordering tests, medications or procedures; communicating with other healthcare professionals and discussing with patient's family/caregivers.    Current Length of Stay: 7 day(s)  Current Patient Status: Inpatient   Certification Statement: The patient will continue to require additional inpatient hospital stay due to above  Discharge Plan: Anticipate discharge in 48-72 hrs to discharge location to be determined pending rehab evaluations.    Code Status: Level 1 - Full Code    Subjective:   Seen post procedure. Was having headache. Had no other issues     Objective:     Vitals:   Temp (24hrs), Av.9 °F (37.2 °C), Min:98.3 °F (36.8 °C), Max:99.4 °F (37.4 °C)    Temp:  [98.3 °F (36.8 °C)-99.4 °F (37.4 °C)] 99.4 °F (37.4 °C)  HR:  [60-75] 70  Resp:  [16-18] 17  BP: (136-214)/() 136/67  SpO2:  [96 %-100 %] 98 %  Body mass index is 33.82 kg/m².     Input and Output Summary (last 24  hours):     Intake/Output Summary (Last 24 hours) at 1/12/2024 1632  Last data filed at 1/12/2024 1247  Gross per 24 hour   Intake 750 ml   Output 805 ml   Net -55 ml       Physical Exam:   Physical Exam  Vitals and nursing note reviewed.   Constitutional:       General: He is not in acute distress.     Appearance: He is well-developed.   HENT:      Head: Normocephalic and atraumatic.   Eyes:      Conjunctiva/sclera: Conjunctivae normal.   Cardiovascular:      Rate and Rhythm: Normal rate and regular rhythm.      Heart sounds: No murmur heard.  Pulmonary:      Effort: Pulmonary effort is normal. No respiratory distress.      Breath sounds: Normal breath sounds.   Abdominal:      Palpations: Abdomen is soft.      Tenderness: There is no abdominal tenderness.   Musculoskeletal:         General: No swelling.      Cervical back: Neck supple.   Skin:     General: Skin is warm and dry.      Capillary Refill: Capillary refill takes less than 2 seconds.   Neurological:      Mental Status: He is alert.   Psychiatric:         Mood and Affect: Mood normal.          Additional Data:     Labs:  Results from last 7 days   Lab Units 01/12/24  0623 01/10/24  0557   WBC Thousand/uL 7.25 6.49   HEMOGLOBIN g/dL 9.4* 8.2*   HEMATOCRIT % 28.4* 24.9*   PLATELETS Thousands/uL 260 192   NEUTROS PCT %  --  76*   LYMPHS PCT %  --  14   MONOS PCT %  --  9   EOS PCT %  --  0     Results from last 7 days   Lab Units 01/12/24  0623 01/11/24  0522   SODIUM mmol/L 136 138   POTASSIUM mmol/L 4.4 4.1   CHLORIDE mmol/L 97 99   CO2 mmol/L 26 28   BUN mg/dL 37* 25   CREATININE mg/dL 8.79* 6.69*   ANION GAP mmol/L 13 11   CALCIUM mg/dL 8.3* 8.2*   ALBUMIN g/dL  --  3.3*   TOTAL BILIRUBIN mg/dL  --  0.53   ALK PHOS U/L  --  64   ALT U/L  --  9   AST U/L  --  13   GLUCOSE RANDOM mg/dL 145* 153*     Results from last 7 days   Lab Units 01/12/24  0623   INR  1.05     Results from last 7 days   Lab Units 01/12/24  1327 01/12/24  0651 01/11/24  9434  01/11/24  1800 01/11/24  1154 01/11/24  0630 01/11/24  0015 01/10/24  1706 01/10/24  1132 01/10/24  0555 01/10/24  0002 01/09/24  1214   POC GLUCOSE mg/dl 98 187* 186* 205* 377* 167* 225* 152* 260* 239* 231* 172*     Results from last 7 days   Lab Units 01/07/24  0541   HEMOGLOBIN A1C % 5.9*           Lines/Drains:  Invasive Devices       Peripheral Intravenous Line  Duration             Peripheral IV 01/09/24 Dorsal (posterior);Right Hand 3 days              Line  Duration             Hemodialysis Access 11/02/20 Left Forearm 1166 days    Hemodialysis AV Fistula 11/09/20 Left Other (Comment) 1159 days                          Imaging: Reviewed radiology reports from this admission including: CT head and MRI brain    Recent Cultures (last 7 days):         Last 24 Hours Medication List:   Current Facility-Administered Medications   Medication Dose Route Frequency Provider Last Rate    acetaminophen  975 mg Oral Q6H HALIE Radha Steen MD      aspirin  81 mg Oral Daily Malu Moore PA-C      atorvastatin  40 mg Oral QPM Radha Steen MD      bisacodyl  10 mg Rectal Daily PRN Radha Steen MD      calcium acetate  667 mg Oral TID With Meals Radha Steen MD      cinacalcet  60 mg Oral Daily Radha Steen MD      cloNIDine  0.3 mg Transdermal Weekly Radha Steen MD      escitalopram  20 mg Oral Daily Radha Steen MD      famotidine  40 mg Oral Daily Radha Steen MD      gabapentin  300 mg Oral Daily Radha Steen MD      hydrALAZINE  10 mg Intravenous Q4H PRN Radha Steen MD      hydrALAZINE  100 mg Oral TID Quinten Henson MD      insulin lispro  300 Units Subcutaneous Insulin Pump Daily PRN Radha Steen MD      labetalol  10 mg Intravenous Q6H PRN Radha Steen MD      labetalol  800 mg Oral TID Radha Steen MD      lisinopril  40 mg Oral Daily Quinten Henson MD      melatonin  6 mg Oral HS PRN Cinthia Dempsey MD      NIFEdipine  90 mg Oral HS Radha Steen MD      ondansetron  4 mg Intravenous Q4H PRN Radha Steen MD      oxyCODONE  2.5 mg Oral Q4H PRN Radha  MD Celsa      Or    oxyCODONE  5 mg Oral Q4H PRN Radha Steen MD      patient maintained insulin pump  1 each Subcutaneous Q8H Radha Steen MD      polyethylene glycol  17 g Oral Daily Radha Steen MD      prochlorperazine  5 mg Oral Q6H PRN Radha Steen MD      senna-docusate sodium  1 tablet Oral BID Radha Steen MD          Today, Patient Was Seen By: Mason Herr MD    **Please Note: This note may have been constructed using a voice recognition system.**

## 2024-01-12 NOTE — PROGRESS NOTES
[facility]  Progress Note  Name: Mateus Mckeon I  MRN: [id number]  Unit/Bed#: [facility]PHP 718-01 I Date of Admission: 1/5/2024   Date of Service: 1/11/2024 I Hospital Day: 6    Assessment/Plan   Primary hypertension  Assessment & Plan  Monitor BP with clonidine, hydralazine, lisinopril, labetalol, procardia   Prn IV labetalol and hydralazine  Appreciate nephrology     End stage kidney disease (HCC)  Assessment & Plan  Dialysis per per nephrology     Type 1 diabetes mellitus (HCC)  Assessment & Plan  Lab Results   Component Value Date    HGBA1C 5.9 (H) 01/07/2024       Recent Labs     01/11/24  0015 01/11/24  0630 01/11/24  1154 01/11/24  1800   POCGLU 225* 167* 377* 205*   Managed with insulin pump    Blood Sugar Average: Last 72 hrs:  (P) [id number]      * Subarachnoid hemorrhage (HCC)  Assessment & Plan  Management per Nsx  For angio tomorrow             VTE Pharmacologic Prophylaxis: VTE Score: 7 Moderate Risk (Score 3-4) - Pharmacological DVT Prophylaxis Contraindicated. Sequential Compression Devices Ordered.    Mobility:   Basic Mobility Inpatient Raw Score: 18  JH-HLM Goal: 6: Walk 10 steps or more  JH-HLM Achieved: 6: Walk 10 steps or more  HLM Goal achieved. Continue to encourage appropriate mobility.    Patient Centered Rounds: I performed bedside rounds with nursing staff today.   Discussions with Specialists or Other Care Team Provider: NEPHROLOGY    Education and Discussions with Family / Patient: patient to update family    Total Time Spent on Date of Encounter in care of patient: 35 mins. This time was spent on one or more of the following: performing physical exam; counseling and coordination of care; obtaining or reviewing history; documenting in the medical record; reviewing/ordering tests, medications or procedures; communicating with other healthcare professionals and discussing with patient's family/caregivers.    Current Length of Stay: 6  day(s)  Current Patient Status: Inpatient   Certification Statement: The patient will continue to require additional inpatient hospital stay due to above  Discharge Plan: Anticipate discharge in >72 hrs to home.    Code Status: Level 1 - Full Code    Subjective:   Mild headache. Feels tired. No N/V    Objective:     Vitals:   Temp (24hrs), Av.7 °F (37.1 °C), Min:98.6 °F (37 °C), Max:98.8 °F (37.1 °C)    Temp:  [98.6 °F (37 °C)-98.8 °F (37.1 °C)] 98.7 °F (37.1 °C)  HR:  [63-78] 64  Resp:  [15-24] 15  BP: (135-183)/(68-98) 182/90  SpO2:  [96 %-99 %] 96 %  Body mass index is 33.93 kg/m².     Input and Output Summary (last 24 hours):   No intake or output data in the 24 hours ending 24 7218    Physical Exam:   Physical Exam  Vitals and nursing note reviewed.   Constitutional:       General: He is not in acute distress.     Appearance: He is well-developed.   HENT:      Head: Normocephalic and atraumatic.   Eyes:      Conjunctiva/sclera: Conjunctivae normal.   Cardiovascular:      Rate and Rhythm: Normal rate and regular rhythm.      Heart sounds: No murmur heard.  Pulmonary:      Effort: Pulmonary effort is normal. No respiratory distress.      Breath sounds: Normal breath sounds.   Abdominal:      Palpations: Abdomen is soft.      Tenderness: There is no abdominal tenderness.   Musculoskeletal:         General: No swelling.      Cervical back: Neck supple.   Skin:     General: Skin is warm and dry.      Capillary Refill: Capillary refill takes less than 2 seconds.   Neurological:      Mental Status: He is alert.   Psychiatric:         Mood and Affect: Mood normal.          Additional Data:     Labs:  Results from last 7 days   Lab Units 01/10/24  0557   WBC Thousand/uL 6.49   HEMOGLOBIN g/dL 8.2*   HEMATOCRIT % 24.9*   PLATELETS Thousands/uL 192   NEUTROS PCT % 76*   LYMPHS PCT % 14   MONOS PCT % 9   EOS PCT % 0     Results from last 7 days   Lab Units 24  0522   SODIUM mmol/L 138   POTASSIUM mmol/L 4.1    CHLORIDE mmol/L 99   CO2 mmol/L 28   BUN mg/dL 25   CREATININE mg/dL 6.69*   ANION GAP mmol/L 11   CALCIUM mg/dL 8.2*   ALBUMIN g/dL 3.3*   TOTAL BILIRUBIN mg/dL 0.53   ALK PHOS U/L 64   ALT U/L 9   AST U/L 13   GLUCOSE RANDOM mg/dL 153*     Results from last 7 days   Lab Units 01/05/24  0351   INR  1.04     Results from last 7 days   Lab Units 01/11/24  1800 01/11/24  1154 01/11/24  0630 01/11/24  0015 01/10/24  1706 01/10/24  1132 01/10/24  0555 01/10/24  0002 01/09/24  1214 01/09/24  0603 01/08/24  2356 01/08/24  1746   POC GLUCOSE mg/dl 205* 377* 167* 225* 152* 260* 239* 231* 172* 176* 189* 164*     Results from last 7 days   Lab Units 01/07/24  0541   HEMOGLOBIN A1C % 5.9*           Lines/Drains:  Invasive Devices       Peripheral Intravenous Line  Duration             Peripheral IV 01/09/24 Dorsal (posterior);Right Hand 2 days              Line  Duration             Hemodialysis Access 11/02/20 Left Forearm 1165 days    Hemodialysis AV Fistula 11/09/20 Left Other (Comment) 1158 days                          Imaging: Reviewed radiology reports from this admission including: CT head    Recent Cultures (last 7 days):         Last 24 Hours Medication List:   Current Facility-Administered Medications   Medication Dose Route Frequency Provider Last Rate    acetaminophen  975 mg Oral Q6H HALIE Radha Steen MD      atorvastatin  40 mg Oral QPM Radha Steen MD      bisacodyl  10 mg Rectal Daily PRN Radha Steen MD      calcium acetate  667 mg Oral TID With Meals Radha Steen MD      cinacalcet  60 mg Oral Daily Radha Steen MD      cloNIDine  0.3 mg Transdermal Weekly Radha Steen MD      escitalopram  20 mg Oral Daily Radha Steen MD      famotidine  40 mg Oral Daily Radha Steen MD      gabapentin  300 mg Oral Daily Radha Steen MD      hydrALAZINE  10 mg Intravenous Q4H PRN Radha Steen MD      hydrALAZINE  100 mg Oral TID Quinten Henson MD      insulin lispro  300 Units Subcutaneous Insulin Pump Daily PRN Radha Steen MD      labetalol  10 mg  Intravenous Q6H PRN Radha Steen MD      labetalol  800 mg Oral TID Radha Steen MD      [START ON 1/12/2024] lisinopril  40 mg Oral Daily Quinten Henson MD      melatonin  6 mg Oral HS PRN Cinthia Dempsey MD      NIFEdipine  90 mg Oral HS Radha Steen MD      niMODipine  60 mg Oral Q4H HALIE Radha Steen MD      ondansetron  4 mg Intravenous Q4H PRN Radha Steen MD      oxyCODONE  2.5 mg Oral Q4H PRN Radha Steen MD      Or    oxyCODONE  5 mg Oral Q4H PRN Radha Steen MD      patient maintained insulin pump  1 each Subcutaneous Q8H Radha Steen MD      polyethylene glycol  17 g Oral Daily Radha Steen MD      prochlorperazine  5 mg Oral Q6H PRN Radha Steen MD      senna-docusate sodium  1 tablet Oral BID Radha Steen MD          Today, Patient Was Seen By: Mason Herr MD    **Please Note: This note may have been constructed using a voice recognition system.**

## 2024-01-12 NOTE — ASSESSMENT & PLAN NOTE
Lab Results   Component Value Date    HGBA1C 5.9 (H) 01/07/2024       Recent Labs     01/11/24  0015 01/11/24  0630 01/11/24  1154 01/11/24  1800   POCGLU 225* 167* 377* 205*   Managed with insulin pump    Blood Sugar Average: Last 72 hrs:  (P) [id number]

## 2024-01-12 NOTE — PROGRESS NOTES
[facility]  Progress Note  Name: Mateus Mckeon I  MRN: [id number]  Unit/Bed#: PPHP 718-01 I Date of Admission: 1/5/2024   Date of Service: 1/12/2024 I Hospital Day: 7    Assessment/Plan   * Subarachnoid hemorrhage (HCC)  Assessment & Plan  Right basilar cistern SAH of unclear etiology  BD 12, HH 2, MF 3  S/p Cerebral angiography x 2 - both negative (Dr. Mittal, 1/5/2024, 1/12/2024)  P/w HA that started on 12/31/23, 5 days prior to admission on 1/4/24.   Hx ASA use for hx multiple strokes, reversed with DDAVP.  MRIs negative for source of SAH, small scattered acute/subacute infarcts noted.   Pt on hemodialysis for CKD.     Imaging:  CT head wo 1/9/24: Improving subarachnoid hemorrhage compared to recent prior studies. No new findings.   MRI brain wo 1/8/24:Stable hematoma in the right CP angle, prepontine and premedullary cisterns. New subarachnoid hemorrhage over the convexities and within the lateral ventricles likely due to redistribution.Few tiny scattered recent infarcts in multiple vascular distributions.  MRI cervical spine wo 1/8/24:No identifiable etiology for subarachnoid hemorrhage.Mild congenital canal stenosis.New marrow edema at C5-C6 with severe disc space narrowing that is similar to recent CT. Modic type I endplate degenerative changes favored over infection but recommend clinical correlation and follow-up imaging if warranted.    Plan:  Angio negative x 2 - at this time, can dc SAH pathway.  Will plan for follow up in 6 weeks time with repeat MRI brain w wo contrast and MRA head wo contrast   Continue to monitor neuro exam closely.  GCS 15 with some mild weakness / ataxia of the right hand following procedure.  Will continue to monitor this closely.  STAT CTA with decline in GCS > 2 pts in 1 hour.  Maintain SBP < 160 mmHg.  Pt with hx of strokes. Will resume his home dose asa 81mg today.  Mobilize as tolerated.  DVT ppx: SCD's,  SQH.    Neurosurgery will  "continue to follow. Call with questions.      End stage kidney disease (HCC)  Assessment & Plan  Lab Results   Component Value Date    EGFR 6 01/12/2024    EGFR 9 01/11/2024    EGFR 7 01/10/2024    CREATININE 8.79 (H) 01/12/2024    CREATININE 6.69 (H) 01/11/2024    CREATININE 8.44 (H) 01/10/2024     On HD (MWF)           Subjective/Objective   Chief Complaint: follow up angiogram    Subjective:  patient states he is not doing well today.  Had angio and HD today so he is tired.  Noted some new right hand weakness / clumsiness after procedure.  No other complaints.    Objective: laying in bed sleeping but arousable and awake for exam, NAD    I/O         01/10 0701 01/11 0700 01/11 0701 01/12 0700 01/12 0701 01/13 0700    P.O. 540      I.V. (mL/kg) 500 (5.4)  550 (6)    Other   200    Total Intake(mL/kg) 1040 (11.2)  750 (8.1)    Other 500  800    Stool 0      Blood   5    Total Output 500  805    Net +540  -55           Unmeasured Stool Occurrence 2 x              Invasive Devices       Peripheral Intravenous Line  Duration             Peripheral IV 01/09/24 Dorsal (posterior);Right Hand 3 days              Line  Duration             Hemodialysis Access 11/02/20 Left Forearm 1166 days    Hemodialysis AV Fistula 11/09/20 Left Other (Comment) 1159 days                    Physical Exam:  Vitals: Blood pressure (!) 173/79, pulse 75, temperature 99.4 °F (37.4 °C), resp. rate 17, height 5' 5\" (1.651 m), weight 92.2 kg (203 lb 4.2 oz), SpO2 99%.,Body mass index is 33.82 kg/m².      General appearance: alert, appears stated age, cooperative and no distress  Head: Normocephalic, without obvious abnormality, atraumatic  Eyes: conjugate gaze  Neck: supple, symmetrical, trachea midline  Lungs: non labored breathing  Heart: regular heart rate  Skin:  right wrist without hematoma noted  Vascular:  radial pulses intact bilaterallyx  Neurologic:   Mental status: Alert, oriented s3, follows commands, thought content " "appropriate  Cranial nerves: grossly intact (Cranial nerves II-XII)  Motor: moving all extremities with mild weakness in right hand with +ataxia  Coordination: finger to nose abnormal on the right with dysmetria noted in the hand, no drift bilaterally      Lab Results:  Results from last 7 days   Lab Units 01/12/24  0623 01/10/24  0557 01/09/24  0436 01/08/24  0654   WBC Thousand/uL 7.25 6.49 10.48* 13.04*   HEMOGLOBIN g/dL 9.4* 8.2* 9.4* 10.3*   HEMATOCRIT % 28.4* 24.9* 27.9* 30.1*   PLATELETS Thousands/uL 260 192 220 217   NEUTROS PCT %  --  76* 83* 80*   MONOS PCT %  --  9 6 6   EOS PCT %  --  0 0 3     Results from last 7 days   Lab Units 01/12/24  0623 01/11/24  0522 01/10/24  0557   SODIUM mmol/L 136 138 133*   POTASSIUM mmol/L 4.4 4.1 4.4   CHLORIDE mmol/L 97 99 92*   CO2 mmol/L 26 28 23   BUN mg/dL 37* 25 37*   CREATININE mg/dL 8.79* 6.69* 8.44*   CALCIUM mg/dL 8.3* 8.2* 8.6   ALK PHOS U/L  --  64  --    ALT U/L  --  9  --    AST U/L  --  13  --      Results from last 7 days   Lab Units 01/10/24  0557 01/09/24  0436 01/08/24  0654   MAGNESIUM mg/dL 2.2 2.1 2.3     Results from last 7 days   Lab Units 01/10/24  0557 01/09/24  0436 01/08/24  0654   PHOSPHORUS mg/dL 5.7* 6.2* 6.1*     Results from last 7 days   Lab Units 01/12/24  0623   INR  1.05   PTT seconds 30     No results found for: \"TROPONINT\"  ABG:  Lab Results   Component Value Date    PHART 7.454 (H) 02/21/2020    PVG9OQC 33.2 (L) 02/21/2020    PO2ART 170.0 (H) 02/21/2020    MFS2VLZ 22.8 02/21/2020    BEART -0.8 02/21/2020    SOURCE Radial, Right 02/21/2020       Imaging Studies: I have personally reviewed pertinent reports.   and I have personally reviewed pertinent films in PACS    CT head wo contrast    Result Date: 1/9/2024  Impression: Improving subarachnoid hemorrhage compared to recent prior studies. No new findings. Other stable and nonemergent findings above. Workstation performed: [id number]     MRI brain w wo contrast    Result Date: " 1/8/2024  Impression: Stable hematoma in the right CP angle, prepontine and premedullary cisterns. New subarachnoid hemorrhage over the convexities and within the lateral ventricles likely due to redistribution. Few tiny scattered recent infarcts in multiple vascular distributions. Study marked in epic for notification. Workstation performed: [id number]     MRI cervical spine w wo contrast    Result Date: 1/8/2024  Impression: No identifiable etiology for subarachnoid hemorrhage. Mild congenital canal stenosis. New marrow edema at C5-C6 with severe disc space narrowing that is similar to recent CT. Modic type I endplate degenerative changes favored over infection but recommend clinical correlation and follow-up imaging if warranted. Study marked in [employer] for notification. Workstation performed: [id number]     CTA head and neck with and without contrast    Result Date: 1/5/2024  Impression: 1.  Small amount of subarachnoid hemorrhage in the right basilar cistern region is again seen without significant change since the prior CT brain exam of the same day. No enhancing brain mass/fluid collection or evidence of vascular malformation. Major intracranial dural venous sinuses are grossly patent. 2.  Mild scattered calcific atherosclerosis of the carotid bifurcation and cavernous carotid segments bilaterally without significant stenosis. Bilateral major cervical/intracranial arteries are patent with normal course and caliber. No arterial occlusion, significant flow-limiting stenosis, or focal saccular aneurysm identified 3.  Nonspecific 4 mm right upper lobe lung nodule is seen. No routine follow-up imaging recommended per current Fleischner Society guidelines for low risk patient. 4.  Opacification of the right mastoid air cells could be secondary to mastoid effusion or mastoiditis, recommend clinical correlation with ENT exam findings. Left mastoid air cells and paranasal sinuses appear well aerated and clear. Workstation  performed: [id number]     CT head without contrast    Result Date: 1/5/2024  Impression: 1.  Right prepontine/premedullary cistern subarachnoid hemorrhage. Minimal mass effect on the right middle cerebellar peduncle. Short-term follow-up is recommended. 2.  Opacification of the right mastoid air cells correlate clinically for mastoiditis. I personally discussed this study with REYNALDO MULLER on 1/5/2024 3:43 AM. Workstation performed: [id number]       EKG, Pathology, and Other Studies: I have personally reviewed pertinent reports.      VTE Pharmacologic Prophylaxis: okay to resume heparin sq tomorrow    VTE Mechanical Prophylaxis: sequential compression device

## 2024-01-12 NOTE — OP NOTE
OPERATIVE REPORT  PATIENT NAME: Mateus Mckeon     :  1985  MRN: [id number]   Pt Location: Interventional radiology    SURGERY DATE: 24     Preop Diagnosis:  1. BD12 Likely BPMH    Postop Diagnosis  1. BD12 Likely BPMH    Procedure:  Use of ultrasound  Right radial arteriogram   Right Common Carotid Arteriogram  Right Internal Carotid Arteriogram  Left Common Carotid Arteriogram  Left Internal Carotid Arteriogram  Right Vertebral Artery Arteriogram    Surgeon:   Jeremy Mittal MD    Specimen(s):  None    Estimated Blood Loss:   None    Drains:  None    Anesthesia Type:   Monitored Anesthesia Care     Complications:  None    Operative Indications:  Mateus Mckeon  is a very pleasant 38 y.o. male who suffered likely benign perimesencephalic hemorrhage.  After discussing the risks and benefits of a diagnostic cerebral arteriogram including bleeding, stroke, groin hematoma, and death the patient elected to proceed.     Procedure Details:  After obtaining written informed consent, the patient was brought into the operating room and moved to the OR table in supine fashion. The right radial artery was prepped and draped in the usual sterile fashion. Monitored anesthesia care was induced.  A surgical time-out was performed.  3 cc mixture of lidocaine and 400 mcg of nitroglycerin was injected immediately above the right radial artery.  Ultrasound guidance under real-time visualization was used to guide the micro puncture needle into the right radial artery. Next, a 5 Dominican tapered sheath was then placed.  Next and infusion of 300 mcg of nitroglycerin, 4000 units heparin, and 5 mg of verapamil were slowly injected intra-arterially through the right radial sheath.  Radial artery arteriogram then performed. Next a 5 Dominican Reilly glide catheter was advanced and reshaped.    The catheter was then withdrawn into the aortic arch and advanced into the left common carotid artery. AP lateral and oblique  images of the left cervical carotid were obtained.     The catheter was then advanced and the left internal carotid artery was then catheterized. AP lateral and magnified oblique images of the left intracranial carotid circulation were obtained.     The right common carotid artery was then catheterized. AP lateral and oblique images of the right cervical carotid were obtained.     The catheter was then advanced and the right internal carotid artery was then catheterized. AP lateral and magnified oblique images of the right intracranial carotid circulation were obtained.     Right vertebral artery was catheterized.  Trans facial and lateral and oblique images of the right vertebral artery circulation were then obtained.    The catheter was then withdrawn from the body and a TR compression band was placed.  There was appropriate hemostasis.   The patient was then awoken from monitored anesthesia care and found to be in baseline neurologic condition. All sponge and needle counts were correct.        INTERPRETATION OF ANGIOGRAPHIC FINDINGS:   1.  The Left common carotid circulation reveals antegrade flow into the internal and external carotid arteries. There is no hemodynamically significant carotid stenosis as defined by NASCET criteria.      2. The Left internal carotid artery circulation reveals antegrade flow into the middle cerebral and anterior cerebral arteries. There is a patent anterior communicating artery. There is a patent posterior communicating artery. There is no evidence of aneurysm, AVM, or vascular malformation. The capillary and venous phases are unremarkable.     3. The Right common carotid circulation reveals antegrade flow into the internal and external carotid arteries. There is no hemodynamically significant carotid stenosis as defined by NASCET criteria.      4. The Right internal carotid artery circulation reveals antegrade flow into the middle cerebral and anterior cerebral arteries. There is  a patent anterior communicating artery. There is a patent posterior communicating artery. There is no evidence of aneurysm, AVM, or vascular malformation. The capillary and venous phases are unremarkable.     5. The Right vertebral intracranial circulation reveals retrograde reflux down the contralateral vertebral artery. Both PICAs are well visualized. Antegrade flow is present intoall the posterior circulation branches. There are no AVMs or aneurysms. The capillary and venous phases are unremarkable.     Impression:  Normal cerebral angiogram.     Patient Disposition:  Critical Care Unit    SIGNATURE: Jeremy Mittal MD  DATE: 01/12/24   TIME: 12:56 PM

## 2024-01-12 NOTE — RESTORATIVE TECHNICIAN NOTE
Restorative Technician Note      Patient Name: Mateus Mckeon     Note Type: Mobility (Pt currently on Bedrest for 2 hours per RN Doreen.)    Savi Plunkett BS, Restorative Technician,

## 2024-01-12 NOTE — ANESTHESIA POSTPROCEDURE EVALUATION
Post-Op Assessment Note    CV Status:  Stable    Pain management: adequate       Mental Status:  Alert and awake   Hydration Status:  Euvolemic   PONV Controlled:  Controlled   Airway Patency:  Patent     Post Op Vitals Reviewed: Yes      Staff: CRNA               BP   180/78   Temp   97   Pulse  72   Resp   16   SpO2   100%

## 2024-01-12 NOTE — RESTORATIVE TECHNICIAN NOTE
Restorative Technician Note      Patient Name: Mateus Mckeon     Note Type: Mobility (Attempted to see pt, pt is currently off the unit.)    Savi Plunkett BS, Restorative Technician,

## 2024-01-12 NOTE — SEDATION DOCUMENTATION
Cerebral Angiogram performed by Dr. Mittal. Anesthesia present throughout  the case. Right wrist access site closed by TR band with 17 ml of air. Bed rest start time is 1245.

## 2024-01-12 NOTE — ASSESSMENT & PLAN NOTE
Lab Results   Component Value Date    EGFR 6 01/12/2024    EGFR 9 01/11/2024    EGFR 7 01/10/2024    CREATININE 8.79 (H) 01/12/2024    CREATININE 6.69 (H) 01/11/2024    CREATININE 8.44 (H) 01/10/2024

## 2024-01-12 NOTE — ASSESSMENT & PLAN NOTE
Lab Results   Component Value Date    EGFR 6 01/12/2024    EGFR 9 01/11/2024    EGFR 7 01/10/2024    CREATININE 8.79 (H) 01/12/2024    CREATININE 6.69 (H) 01/11/2024    CREATININE 8.44 (H) 01/10/2024     On HD (MWF)

## 2024-01-12 NOTE — ANESTHESIA PREPROCEDURE EVALUATION
Procedure:  IR CEREBRAL ANGIOGRAPHY    Relevant Problems   CARDIO   (+) Coronary artery disease involving native coronary artery of native heart without angina pectoris   (+) Essential (primary) hypertension   (+) Hyperlipidemia   (+) Hypertension   (+) Primary hypertension   (+) Pulmonary HTN (HCC)   (+) Renovascular hypertension      ENDO   (+) Hypothyroidism   (+) Secondary hyperparathyroidism (HCC)   (+) Type 1 diabetes mellitus (HCC)   (+) Type 1 diabetes mellitus on insulin therapy (HCC)      GI/HEPATIC   (+) GERD (gastroesophageal reflux disease)      /RENAL   (+) Anemia due to chronic kidney disease, on chronic dialysis (HCC)   (+) End stage kidney disease (HCC)      HEMATOLOGY   (+) Anemia due to chronic kidney disease, on chronic dialysis (HCC)   (+) Anemia in chronic kidney disease   (+) Anemia in chronic kidney disease, on chronic dialysis ([facility])      NEURO/PSYCH   (+) Anxiety   (+) Binocular visual disturbance   (+) Cerebellar stroke syndrome   (+) Diabetic neuropathy ([facility])   (+) Gastroparesis diabeticorum    (+) Generalized anxiety disorder   (+) Mild episode of recurrent major depressive disorder ([facility])   (+) Provoked seizure ([facility])      PULMONARY   (+) RACHEAL (obstructive sleep apnea)      Cardiovascular and Mediastinum   (+) Left atrial dilation      Digestive   (+) Gastroenteritis      Nervous and Auditory   (+) 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 Wall thickness is moderately increased. There is moderate concentric hypertrophy. The left ventricular ejection fraction is 70%. Systolic function is vigorous. Wall motion is normal. Diastolic function is normal.    Left Atrium:  The atrium is moderately dilated.    Right Atrium: The atrium is mildly dilated.    Atrial Septum: No patent foramen ovale detected, confirmed at rest using color doppler. Limited sensitivity.    Mitral Valve: There is trace regurgitation.    Pericardium: There is a trivial pericardial effusion posterior to the heart. The fluid exhibits no internal echoes.    Normal sinus rhythm  Nonspecific ST and T wave abnormality  Abnormal ECG  When compared with ECG of 05-JAN-2024 08:01,  No significant change was found  Confirmed by Aime Grey ([id number]) on 1/6/2024 4:10:58 PM                   Component  Ref Range & Units 1/12/24 0623 1/11/24 0522 1/10/24 0557 1/9/24 0436 1/8/24 0654 1/7/24 0541 1/6/24 0527   Sodium  135 - 147 mmol/L 136 138 133 Low  134 Low  135 138 136   Potassium  3.5 - 5.3 mmol/L 4.4 4.1 4.4 4.3 5.2 4.2 4.9   Chloride  96 - 108 mmol/L 97 99 92 Low  94 Low  93 Low  97 95 Low    CO2  21 - 32 mmol/L 26 28 23 25 19 Low  25 26   ANION GAP  mmol/L 13 11 18 15 23 16 15   BUN  5 - 25 mg/dL 37 High  25 37 High  41 High  62 High  45 High  38 High    Creatinine  0.60 - 1.30 mg/dL 8.79 High  6.69 High  CM 8.44 High  CM 9.84 High  CM 14.28 High  CM 12.62 High  CM 10.27 High  CM      Glucose  65 - 140 mg/dL 145 High  153 High   High   High   CM 91  High  CM      Calcium  8.4 - 10.2 mg/dL 8.3 Low  8.2 Low  8.6 8.5 8.3 Low  7.8 Low  9.1   eGFR  ml/min/1.73sq m 6 9 7 5 3 4 5     Ref Range & Units 1/12/24 0623 1/10/24 0557 1/9/24 0436 1/8/24 0654 1/7/24 0541 1/6/24 0527 1/4/24 2243   WBC  4.31 - 10.16 Thousand/uL 7.25 6.49 10.48 High  13.04 High  9.89 10.25 High  8.46   RBC  3.88 - 5.62 Million/uL 3.02 Low  2.60 Low  3.02 Low  3.23 Low  2.92 Low  3.06 Low  3.64 Low    Hemoglobin  12.0 - 17.0 g/dL 9.4 Low  8.2 Low  9.4 Low  10.3 Low  9.1 Low  9.6 Low  11.5 Low    Hematocrit  36.5 - 49.3 % 28.4 Low  24.9 Low  27.9 Low  30.1 Low  27.8 Low  29.2 Low  34.3 Low    MCV  82 - 98 fL 94 96 92 93 95 95 94    MCH  26.8 - 34.3 pg 31.1 31.5 31.1 31.9 31.2 31.4 31.6   MCHC  31.4 - 37.4 g/dL 33.1 32.9 33.7 34.2 32.7 32.9 33.5   RDW  11.6 - 15.1 % 14.0 13.7 13.9 13.9 14.1 14.4 13.8   Platelets  149 - 390 Thousands/uL 260 192 220 217 195 208 210       Physical Exam    Airway    Mallampati score: II  TM Distance: >3 FB  Neck ROM: full     Dental       Cardiovascular      Pulmonary      Other Findings      Anesthesia Plan  ASA Score- 3     Anesthesia Type- IV sedation with anesthesia with ASA Monitors.         Additional Monitors:     Airway Plan:            Plan Factors-    Chart reviewed. EKG reviewed. Imaging results reviewed. Existing labs reviewed. Patient summary reviewed.    Patient is not a current smoker.  Patient did not smoke on day of surgery.            Induction- intravenous.    Postoperative Plan-     Informed Consent- Anesthetic plan and risks discussed with patient.  I personally reviewed this patient with the CRNA. Discussed and agreed on the Anesthesia Plan with the CRNA..

## 2024-01-12 NOTE — ASSESSMENT & PLAN NOTE
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 Mittal, 1/5/2024, 1/12/2024)4.   On ASA for previous strokes reversed with DDAVP.  MRIs negative for source of SAH, small scattered acute/subacute infarcts noted.   CT head wo 1/9/24: Improving subarachnoid hemorrhage compared to recent prior studies. No new findings.     Plan:  ASA 81 mg daily resumed and Nimodipine discontinued per NSX recs.  No further need for SAH pathway.  Continue neurochecks  BP control has been very challenging. Goal of SBP<160

## 2024-01-12 NOTE — PROGRESS NOTES
Progress Note - Nephrology   Mateus Mckeon 38 y.o. male MRN: [id number]  Unit/Bed#: [facility] 718-01 Encounter: [id number]      Assessment / Plan:  ESRD on HD at [facility] Monday Wednesday Friday-plan for hemodialysis today in the setting of MRI this morning to reduce risk of NSF although risk is less with class II gadolinium agents  -Because of end-stage renal disease due to diabetic nephropathy, previously on PD but changed to HD December 2020  -Patient s/p HD today  - Higher dialysate sodium bath at 142 from goal serum sodium 138 per discussion with neurosurgery in the setting of subarachnoid hemorrhage.  Access-left upper extremity AV fistula well-functioning  Subarachnoid hemorrhage-management per ICU and neurosurgical teams, goal serum sodium 138 in light of concerns for vasospasm. sNa 136 this am, correct with HD  Hypertension-does have history of hypertensive emergency in the past, goal blood pressure systolic less than 160 per neurosurgery, off Cardene drip, continue clonidine 0.3 mg weekly patch, hydralazine 100 mg p.o. 3 times daily, labetalol 800 mg p.o. 3 times daily, lisinopril 40 mg p.o. twice daily, nifedipine 90 mg p.o. daily at bedtime as well as as needed hydralazine  -Off Nimotop 60 mg p.o. every 4hr with higher BP noted. Consider cardene gtt   -Of note, hydralazine dose has been doubled in the last 24 hours.  Hyperphosphatemia-continue PhosLo 1 tab 3 times daily with meals, monitor Phos outpatient, on Velphoro as an outpatient, Phos 6.2-->5.7 inpatient  Secondary hyperparathyroidism in origin-continue Sensipar 60 mg daily, monitor PTH outpatient.    Diabetes mellitus type 2 in the setting of ESRD-management per primary team  History of hyperkalemia-on Veltassa 5 mg on nondialysis days as an outpatient, potassium stable, K 4.4 today  Nonelevated anion gap metabolic acidosis-bicarbonate 26, resolved with HD, monitor BMP  Anemia of CKD-hemoglobin at goal, hemoglobin 9.4 today, monitor  "CBC      Subjective:   He complains of headache but denies chest pain or shortness of breath.      Objective:     Vitals: Blood pressure (!) 173/79, pulse 75, temperature 99.4 °F (37.4 °C), resp. rate 17, height 5' 5\" (1.651 m), weight 92.2 kg (203 lb 4.2 oz), SpO2 99%.,Body mass index is 33.82 kg/m².Temp (24hrs), Av.9 °F (37.2 °C), Min:98.3 °F (36.8 °C), Max:99.4 °F (37.4 °C)      Weight (last 2 days)       Date/Time Weight    24 0600 92.2 (203.26)    24 0600 92.5 (203.93)              Intake/Output Summary (Last 24 hours) at 2024 1542  Last data filed at 2024 1247  Gross per 24 hour   Intake 750 ml   Output 805 ml   Net -55 ml     I/O last 24 hours:  In: 750 [I.V.:550; Other:200]  Out: 805 [Other:800; Blood:5]        Physical Exam:   Physical Exam  Vitals reviewed.   Constitutional:       General: He is not in acute distress.     Appearance: Normal appearance. He is well-developed. He is not diaphoretic.   HENT:      Head: Normocephalic and atraumatic.      Nose: Nose normal.      Mouth/Throat:      Mouth: Mucous membranes are moist.      Pharynx: No oropharyngeal exudate.   Eyes:      General: No scleral icterus.        Right eye: No discharge.         Left eye: No discharge.   Neck:      Thyroid: No thyromegaly.   Cardiovascular:      Rate and Rhythm: Normal rate and regular rhythm.      Heart sounds: Normal heart sounds.   Pulmonary:      Effort: Pulmonary effort is normal.      Breath sounds: Normal breath sounds. No wheezing or rales.   Abdominal:      General: Bowel sounds are normal. There is no distension.      Palpations: Abdomen is soft.      Tenderness: There is no abdominal tenderness.   Musculoskeletal:         General: No swelling. Normal range of motion.      Cervical back: Neck supple.   Lymphadenopathy:      Cervical: No cervical adenopathy.   Skin:     General: Skin is warm and dry.      Findings: No rash.   Neurological:      General: No focal deficit present.      " Mental Status: He is alert.      Comments: awake   Psychiatric:         Mood and Affect: Mood normal.         Behavior: Behavior normal.         Invasive Devices       Peripheral Intravenous Line  Duration             Peripheral IV 01/09/24 Dorsal (posterior);Right Hand 3 days              Line  Duration             Hemodialysis Access 11/02/20 Left Forearm 1166 days    Hemodialysis AV Fistula 11/09/20 Left Other (Comment) 1159 days                    Medications:    Scheduled Meds:  Current Facility-Administered Medications   Medication Dose Route Frequency Provider Last Rate    acetaminophen  975 mg Oral Q6H HALIE Radha Steen MD      aspirin  81 mg Oral Daily Malu Moore PA-C      atorvastatin  40 mg Oral QPM Radha Steen MD      bisacodyl  10 mg Rectal Daily PRN Radha Steen MD      calcium acetate  667 mg Oral TID With Meals Radha Steen MD      cinacalcet  60 mg Oral Daily Radha Steen MD      cloNIDine  0.3 mg Transdermal Weekly Radha Steen MD      escitalopram  20 mg Oral Daily Radha Steen MD      famotidine  40 mg Oral Daily Radha Steen MD      gabapentin  300 mg Oral Daily Radha Steen MD      hydrALAZINE  10 mg Intravenous Q4H PRN Radha Steen MD      hydrALAZINE  100 mg Oral TID Quinten Henson MD      insulin lispro  300 Units Subcutaneous Insulin Pump Daily PRN Radha Steen MD      labetalol  10 mg Intravenous Q6H PRN Radha Steen MD      labetalol  800 mg Oral TID Radha Steen MD      lisinopril  40 mg Oral Daily Quinten Henson MD      melatonin  6 mg Oral HS PRN Cinthia Dempsey MD      NIFEdipine  90 mg Oral HS Radha Steen MD      ondansetron  4 mg Intravenous Q4H PRN Radha Steen MD      oxyCODONE  2.5 mg Oral Q4H PRN Radha Steen MD      Or    oxyCODONE  5 mg Oral Q4H PRN Radha Steen MD      patient maintained insulin pump  1 each Subcutaneous Q8H Radha Steen MD      polyethylene glycol  17 g Oral Daily Radha Steen MD      prochlorperazine  5 mg Oral Q6H PRN Radha Steen MD      senna-docusate sodium  1 tablet Oral BID Radha Steen MD    "      PRN Meds:.  bisacodyl    hydrALAZINE    insulin lispro    labetalol    melatonin    ondansetron    oxyCODONE **OR** oxyCODONE    prochlorperazine    Continuous Infusions:         LAB RESULTS:      Results from last 7 days   Lab Units 01/12/24  0623 01/11/24  0522 01/10/24  0557 01/09/24  0436 01/08/24  0654 01/07/24  0541 01/06/24  0527   WBC Thousand/uL 7.25  --  6.49 10.48* 13.04* 9.89 10.25*   HEMOGLOBIN g/dL 9.4*  --  8.2* 9.4* 10.3* 9.1* 9.6*   HEMATOCRIT % 28.4*  --  24.9* 27.9* 30.1* 27.8* 29.2*   PLATELETS Thousands/uL 260  --  192 220 217 195 208   NEUTROS PCT %  --   --  76* 83* 80* 71 82*   LYMPHS PCT %  --   --  14 9* 9* 17 10*   MONOS PCT %  --   --  9 6 6 8 7   EOS PCT %  --   --  0 0 3 2 0   POTASSIUM mmol/L 4.4 4.1 4.4 4.3 5.2 4.2 4.9   CHLORIDE mmol/L 97 99 92* 94* 93* 97 95*   CO2 mmol/L 26 28 23 25 19* 25 26   BUN mg/dL 37* 25 37* 41* 62* 45* 38*   CREATININE mg/dL 8.79* 6.69* 8.44* 9.84* 14.28* 12.62* 10.27*   CALCIUM mg/dL 8.3* 8.2* 8.6 8.5 8.3* 7.8* 9.1   ALK PHOS U/L  --  64  --   --   --   --   --    ALT U/L  --  9  --   --   --   --   --    AST U/L  --  13  --   --   --   --   --    MAGNESIUM mg/dL  --   --  2.2 2.1 2.3 2.1 2.2   PHOSPHORUS mg/dL  --   --  5.7* 6.2* 6.1* 6.0* 6.1*       CUTURES:  Lab Results   Component Value Date    BLOODCX No Growth After 5 Days. 11/26/2023    BLOODCX No Growth After 5 Days. 11/26/2023    BLOODCX Staphylococcus coagulase negative (A) 07/15/2022    BLOODCX No Growth After 5 Days. 07/15/2022    BLOODCX No Growth After 5 Days. 03/31/2021    BLOODCX No Growth After 5 Days. 03/31/2021    BLOODCX No Growth After 5 Days. 11/13/2020    URINECX No Growth <1000 cfu/mL 12/10/2019                 Portions of the record may have been created with voice recognition software. Occasional wrong word or \"sound a like\" substitutions may have occurred due to the inherent limitations of voice recognition software. Read the chart carefully and recognize, using context, " where substitutions have occurred.If you have any questions, please contact the dictating provider.

## 2024-01-13 ENCOUNTER — APPOINTMENT (INPATIENT)
Dept: NON INVASIVE DIAGNOSTICS | Facility: HOSPITAL | Age: 39
DRG: 064 | End: 2024-01-13
Payer: MEDICARE

## 2024-01-13 ENCOUNTER — APPOINTMENT (INPATIENT)
Dept: RADIOLOGY | Facility: HOSPITAL | Age: 39
DRG: 064 | End: 2024-01-13
Payer: MEDICARE

## 2024-01-13 PROBLEM — R29.898 RIGHT HAND WEAKNESS: Status: ACTIVE | Noted: 2024-01-13

## 2024-01-13 LAB
ANION GAP SERPL CALCULATED.3IONS-SCNC: 13 MMOL/L
BASOPHILS # BLD AUTO: 0.08 THOUSANDS/ÂΜL (ref 0–0.1)
BASOPHILS NFR BLD AUTO: 1 % (ref 0–1)
BUN SERPL-MCNC: 29 MG/DL (ref 5–25)
CALCIUM SERPL-MCNC: 7.7 MG/DL (ref 8.4–10.2)
CHLORIDE SERPL-SCNC: 98 MMOL/L (ref 96–108)
CO2 SERPL-SCNC: 27 MMOL/L (ref 21–32)
CREAT SERPL-MCNC: 7.07 MG/DL (ref 0.6–1.3)
EOSINOPHIL # BLD AUTO: 0.5 THOUSAND/ÂΜL (ref 0–0.61)
EOSINOPHIL NFR BLD AUTO: 6 % (ref 0–6)
ERYTHROCYTE [DISTWIDTH] IN BLOOD BY AUTOMATED COUNT: 14.1 % (ref 11.6–15.1)
GFR SERPL CREATININE-BSD FRML MDRD: 8 ML/MIN/1.73SQ M
GLUCOSE SERPL-MCNC: 114 MG/DL (ref 65–140)
GLUCOSE SERPL-MCNC: 116 MG/DL (ref 65–140)
GLUCOSE SERPL-MCNC: 147 MG/DL (ref 65–140)
GLUCOSE SERPL-MCNC: 164 MG/DL (ref 65–140)
GLUCOSE SERPL-MCNC: 168 MG/DL (ref 65–140)
HCT VFR BLD AUTO: 28.5 % (ref 36.5–49.3)
HGB BLD-MCNC: 9.5 G/DL (ref 12–17)
IMM GRANULOCYTES # BLD AUTO: 0.05 THOUSAND/UL (ref 0–0.2)
IMM GRANULOCYTES NFR BLD AUTO: 1 % (ref 0–2)
LYMPHOCYTES # BLD AUTO: 1.22 THOUSANDS/ÂΜL (ref 0.6–4.47)
LYMPHOCYTES NFR BLD AUTO: 16 % (ref 14–44)
MCH RBC QN AUTO: 31.6 PG (ref 26.8–34.3)
MCHC RBC AUTO-ENTMCNC: 33.3 G/DL (ref 31.4–37.4)
MCV RBC AUTO: 95 FL (ref 82–98)
MONOCYTES # BLD AUTO: 0.56 THOUSAND/ÂΜL (ref 0.17–1.22)
MONOCYTES NFR BLD AUTO: 7 % (ref 4–12)
NEUTROPHILS # BLD AUTO: 5.42 THOUSANDS/ÂΜL (ref 1.85–7.62)
NEUTS SEG NFR BLD AUTO: 69 % (ref 43–75)
NRBC BLD AUTO-RTO: 0 /100 WBCS
PLATELET # BLD AUTO: 273 THOUSANDS/UL (ref 149–390)
PMV BLD AUTO: 11.1 FL (ref 8.9–12.7)
POTASSIUM SERPL-SCNC: 4.3 MMOL/L (ref 3.5–5.3)
RBC # BLD AUTO: 3.01 MILLION/UL (ref 3.88–5.62)
SODIUM SERPL-SCNC: 138 MMOL/L (ref 135–147)
WBC # BLD AUTO: 7.83 THOUSAND/UL (ref 4.31–10.16)

## 2024-01-13 PROCEDURE — 70496 CT ANGIOGRAPHY HEAD: CPT

## 2024-01-13 PROCEDURE — 70498 CT ANGIOGRAPHY NECK: CPT

## 2024-01-13 PROCEDURE — 82948 REAGENT STRIP/BLOOD GLUCOSE: CPT

## 2024-01-13 PROCEDURE — G1004 CDSM NDSC: HCPCS

## 2024-01-13 PROCEDURE — 93886 INTRACRANIAL COMPLETE STUDY: CPT | Performed by: SURGERY

## 2024-01-13 PROCEDURE — 80048 BASIC METABOLIC PNL TOTAL CA: CPT | Performed by: INTERNAL MEDICINE

## 2024-01-13 PROCEDURE — 93886 INTRACRANIAL COMPLETE STUDY: CPT

## 2024-01-13 PROCEDURE — 99222 1ST HOSP IP/OBS MODERATE 55: CPT | Performed by: PSYCHIATRY & NEUROLOGY

## 2024-01-13 PROCEDURE — 99232 SBSQ HOSP IP/OBS MODERATE 35: CPT | Performed by: NEUROLOGICAL SURGERY

## 2024-01-13 PROCEDURE — 99233 SBSQ HOSP IP/OBS HIGH 50: CPT | Performed by: INTERNAL MEDICINE

## 2024-01-13 PROCEDURE — 85025 COMPLETE CBC W/AUTO DIFF WBC: CPT | Performed by: INTERNAL MEDICINE

## 2024-01-13 RX ORDER — HYDRALAZINE HYDROCHLORIDE 20 MG/ML
20 INJECTION INTRAMUSCULAR; INTRAVENOUS EVERY 4 HOURS PRN
Status: DISCONTINUED | OUTPATIENT
Start: 2024-01-13 | End: 2024-01-23 | Stop reason: HOSPADM

## 2024-01-13 RX ADMIN — ESCITALOPRAM OXALATE 20 MG: 20 TABLET ORAL at 08:41

## 2024-01-13 RX ADMIN — IOHEXOL 85 ML: 350 INJECTION, SOLUTION INTRAVENOUS at 13:33

## 2024-01-13 RX ADMIN — HYDRALAZINE HYDROCHLORIDE 20 MG: 20 INJECTION, SOLUTION INTRAMUSCULAR; INTRAVENOUS at 18:40

## 2024-01-13 RX ADMIN — CALCIUM ACETATE 667 MG: 667 CAPSULE ORAL at 12:23

## 2024-01-13 RX ADMIN — HYDRALAZINE HYDROCHLORIDE 100 MG: 50 TABLET, FILM COATED ORAL at 16:08

## 2024-01-13 RX ADMIN — Medication 10 MG: at 17:17

## 2024-01-13 RX ADMIN — CALCIUM ACETATE 667 MG: 667 CAPSULE ORAL at 08:47

## 2024-01-13 RX ADMIN — FAMOTIDINE 40 MG: 20 TABLET, FILM COATED ORAL at 08:41

## 2024-01-13 RX ADMIN — LABETALOL HYDROCHLORIDE 800 MG: 200 TABLET, FILM COATED ORAL at 16:08

## 2024-01-13 RX ADMIN — ONDANSETRON 4 MG: 2 INJECTION INTRAMUSCULAR; INTRAVENOUS at 00:48

## 2024-01-13 RX ADMIN — GABAPENTIN 300 MG: 300 CAPSULE ORAL at 14:09

## 2024-01-13 RX ADMIN — ASPIRIN 81 MG CHEWABLE TABLET 81 MG: 81 TABLET CHEWABLE at 08:41

## 2024-01-13 RX ADMIN — CALCIUM ACETATE 667 MG: 667 CAPSULE ORAL at 17:15

## 2024-01-13 RX ADMIN — LISINOPRIL 40 MG: 20 TABLET ORAL at 08:41

## 2024-01-13 RX ADMIN — HYDRALAZINE HYDROCHLORIDE 100 MG: 50 TABLET, FILM COATED ORAL at 21:20

## 2024-01-13 RX ADMIN — ACETAMINOPHEN 975 MG: 325 TABLET, FILM COATED ORAL at 05:48

## 2024-01-13 RX ADMIN — HYDRALAZINE HYDROCHLORIDE 10 MG: 20 INJECTION, SOLUTION INTRAMUSCULAR; INTRAVENOUS at 14:09

## 2024-01-13 RX ADMIN — LABETALOL HYDROCHLORIDE 800 MG: 200 TABLET, FILM COATED ORAL at 21:20

## 2024-01-13 RX ADMIN — CINACALCET 60 MG: 30 TABLET ORAL at 08:40

## 2024-01-13 RX ADMIN — SENNOSIDES, DOCUSATE SODIUM 1 TABLET: 8.6; 5 TABLET ORAL at 08:41

## 2024-01-13 RX ADMIN — ATORVASTATIN CALCIUM 40 MG: 40 TABLET, FILM COATED ORAL at 17:16

## 2024-01-13 RX ADMIN — HYDRALAZINE HYDROCHLORIDE 100 MG: 50 TABLET, FILM COATED ORAL at 08:41

## 2024-01-13 RX ADMIN — NIFEDIPINE 90 MG: 30 TABLET, FILM COATED, EXTENDED RELEASE ORAL at 21:20

## 2024-01-13 RX ADMIN — LABETALOL HYDROCHLORIDE 800 MG: 200 TABLET, FILM COATED ORAL at 08:47

## 2024-01-13 NOTE — QUICK NOTE
sNa at goal 138.  Stable from a nephrology perspective.  Plan for next hemodialysis on Monday, January 15 per outpatient schedule.  Nephrology to see next in formal follow-up on Monday.  Please call or text with questions.

## 2024-01-13 NOTE — ASSESSMENT & PLAN NOTE
Lab Results   Component Value Date    HGBA1C 5.9 (H) 01/07/2024       Recent Labs     01/12/24  2341 01/13/24  0014 01/13/24  0643 01/13/24  1146   POCGLU 133 116 147* 164*       Blood Sugar Average: Last 72 hrs:  (P) 191.8125    -euglycemic goals, management per primary team

## 2024-01-13 NOTE — ASSESSMENT & PLAN NOTE
Lab Results   Component Value Date    EGFR 8 01/13/2024    EGFR 6 01/12/2024    EGFR 9 01/11/2024    CREATININE 7.07 (H) 01/13/2024    CREATININE 8.79 (H) 01/12/2024    CREATININE 6.69 (H) 01/11/2024

## 2024-01-13 NOTE — PROGRESS NOTES
Patient seen and examined independently at approximately 715, 1/13/2024.    Complains of right arm hand and clumsiness.  Temp:  [98.3 °F (36.8 °C)-99.4 °F (37.4 °C)] 98.8 °F (37.1 °C)  HR:  [60-75] 69  Resp:  [16-17] 17  BP: (136-214)/() 169/77  FiO2 (%):  [21] 21  Aao3 fluent   Perrl eomi, slight right facial  Good proximal arm strength, distal 3/5 in hand, poor   Full on left.   +drift    Lab Results   Component Value Date/Time    SODIUM 138 01/13/2024 05:48 AM    CREATININE 7.07 (H) 01/13/2024 05:48 AM    WBC 7.83 01/13/2024 05:48 AM    HGB 9.5 (L) 01/13/2024 05:48 AM     01/13/2024 05:48 AM    PTT 30 01/12/2024 06:23 AM    INR 1.05 01/12/2024 06:23 AM     Bd13 angio neg sah s/p repeat angio yesterday  -concern for ischemia given new hand weakness. Would eval, previously had dwi hits on mri which I pressumed with angiographic sequelae.  -ASA 81 restarted.  -Can come off subarachnoid pathway.  -pt ot pmr  -called and spoke to his mother on the phone this morning to explain exam and angiogram findings.    -cont dvt ppx

## 2024-01-13 NOTE — PROGRESS NOTES
[facility]  Progress Note  Name: Mateus Mckeon I  MRN: [id number]  Unit/Bed#: [facility] 718-01 I Date of Admission: 1/5/2024   Date of Service: 1/13/2024 I Hospital Day: 8    Assessment/Plan   * Subarachnoid hemorrhage (HCC)  Assessment & Plan  Presented with significant headaches started since 12/31/2023  Right basilar cistern SAH of unclear etiology  S/p Cerebral angiography x 2 - both negative (Dr. Mittal, 1/5/2024, 1/12/2024)4.   On ASA for previous strokes reversed with DDAVP.  MRIs negative for source of SAH, small scattered acute/subacute infarcts noted.   CT head wo 1/9/24: Improving subarachnoid hemorrhage compared to recent prior studies. No new findings.     Plan:  ASA 81 mg daily resumed and Nimodipine discontinued per NSX recs.  No further need for SAH pathway.  Continue neurochecks  BP control has been very challenging. Goal of SBP<160      Right hand weakness  Assessment & Plan  New onset RUE weakness. Per patient started 1/12. Also with mild facial droop.   D/w neurology. Consult placed.  Obtain CTA H/N and MRI brain w/o contrast.  Continue stroke pathway  ASA 81 mg daily and atorvastatin 40mg daily.     Type 1 diabetes mellitus (HCC)  Assessment & Plan  Lab Results   Component Value Date    HGBA1C 5.9 (H) 01/07/2024       Recent Labs     01/12/24  2341 01/13/24  0014 01/13/24  0643 01/13/24  1146   POCGLU 133 116 147* 164*     Managed with insulin pump  Endocrinology following    Blood Sugar Average: Last 72 hrs:  (P) 191.8125      Primary hypertension  Assessment & Plan  Monitor BP with clonidine, hydralazine, lisinopril, labetalol, procardia   Prn IV labetalol and hydralazine  Appreciate nephrology     Anemia due to chronic kidney disease, on chronic dialysis (HCC)  Assessment & Plan  Lab Results   Component Value Date    EGFR 8 01/13/2024    EGFR 6 01/12/2024    EGFR 9 01/11/2024    CREATININE 7.07 (H) 01/13/2024    CREATININE 8.79 (H) 01/12/2024     CREATININE 6.69 (H) 2024       End stage kidney disease (HCC)  Assessment & Plan  Dialysis per nephrology     Anemia in chronic kidney disease, on chronic dialysis (HCC)  Assessment & Plan  Lab Results   Component Value Date    EGFR 8 2024    EGFR 6 2024    EGFR 9 2024    CREATININE 7.07 (H) 2024    CREATININE 8.79 (H) 2024    CREATININE 6.69 (H) 2024                VTE Pharmacologic Prophylaxis: VTE Score: 7 Moderate Risk (Score 3-4) - Pharmacological DVT Prophylaxis Contraindicated. Sequential Compression Devices Ordered.    Mobility:   Basic Mobility Inpatient Raw Score: 18  JH-HLM Goal: 6: Walk 10 steps or more  JH-HLM Achieved: 6: Walk 10 steps or more  HLM Goal achieved. Continue to encourage appropriate mobility.    Patient Centered Rounds: I performed bedside rounds with nursing staff today.   Discussions with Specialists or Other Care Team Provider: neurology     Education and Discussions with Family / Patient: Updated  (father and mother) at bedside.    Total Time Spent on Date of Encounter in care of patient: 50 mins. This time was spent on one or more of the following: performing physical exam; counseling and coordination of care; obtaining or reviewing history; documenting in the medical record; reviewing/ordering tests, medications or procedures; communicating with other healthcare professionals and discussing with patient's family/caregivers.    Current Length of Stay: 8 day(s)  Current Patient Status: Inpatient   Certification Statement: The patient will continue to require additional inpatient hospital stay due to new stroke symptoms  Discharge Plan: Anticipate discharge in >72 hrs to discharge location to be determined pending rehab evaluations.    Code Status: Level 1 - Full Code    Subjective:   New RUE weakness. Very tired. No CP/SOB.     Objective:     Vitals:   Temp (24hrs), Av.5 °F (36.9 °C), Min:98.2 °F (36.8 °C), Max:98.8 °F (37.1  °C)    Temp:  [98.2 °F (36.8 °C)-98.8 °F (37.1 °C)] 98.6 °F (37 °C)  HR:  [64-73] 69  Resp:  [16] 16  BP: (136-188)/(61-93) 169/81  SpO2:  [97 %-100 %] 98 %  Body mass index is 34.89 kg/m².     Input and Output Summary (last 24 hours):     Intake/Output Summary (Last 24 hours) at 1/13/2024 1549  Last data filed at 1/12/2024 2001  Gross per 24 hour   Intake --   Output 1 ml   Net -1 ml       Physical Exam:   Physical Exam  Vitals and nursing note reviewed.   Constitutional:       General: He is not in acute distress.     Appearance: He is well-developed. He is ill-appearing.   HENT:      Head: Normocephalic and atraumatic.   Eyes:      Conjunctiva/sclera: Conjunctivae normal.   Cardiovascular:      Rate and Rhythm: Normal rate and regular rhythm.      Heart sounds: No murmur heard.  Pulmonary:      Effort: Pulmonary effort is normal. No respiratory distress.      Breath sounds: Normal breath sounds.   Abdominal:      Palpations: Abdomen is soft.      Tenderness: There is no abdominal tenderness.   Musculoskeletal:         General: No swelling.      Cervical back: Neck supple.   Neurological:      Mental Status: He is alert.      Motor: Weakness (RUE) present.          Additional Data:     Labs:  Results from last 7 days   Lab Units 01/13/24  0548   WBC Thousand/uL 7.83   HEMOGLOBIN g/dL 9.5*   HEMATOCRIT % 28.5*   PLATELETS Thousands/uL 273   NEUTROS PCT % 69   LYMPHS PCT % 16   MONOS PCT % 7   EOS PCT % 6     Results from last 7 days   Lab Units 01/13/24  0548 01/12/24  0623 01/11/24  0522   SODIUM mmol/L 138   < > 138   POTASSIUM mmol/L 4.3   < > 4.1   CHLORIDE mmol/L 98   < > 99   CO2 mmol/L 27   < > 28   BUN mg/dL 29*   < > 25   CREATININE mg/dL 7.07*   < > 6.69*   ANION GAP mmol/L 13   < > 11   CALCIUM mg/dL 7.7*   < > 8.2*   ALBUMIN g/dL  --   --  3.3*   TOTAL BILIRUBIN mg/dL  --   --  0.53   ALK PHOS U/L  --   --  64   ALT U/L  --   --  9   AST U/L  --   --  13   GLUCOSE RANDOM mg/dL 114   < > 153*    < > =  values in this interval not displayed.     Results from last 7 days   Lab Units 01/12/24  0623   INR  1.05     Results from last 7 days   Lab Units 01/13/24  1146 01/13/24  0643 01/13/24  0014 01/12/24  2341 01/12/24  1917 01/12/24  1327 01/12/24  0651 01/11/24  2334 01/11/24  1800 01/11/24  1154 01/11/24  0630 01/11/24  0015   POC GLUCOSE mg/dl 164* 147* 116 133 182* 98 187* 186* 205* 377* 167* 225*     Results from last 7 days   Lab Units 01/07/24  0541   HEMOGLOBIN A1C % 5.9*           Lines/Drains:  Invasive Devices       Peripheral Intravenous Line  Duration             Peripheral IV 01/13/24 Left;Ventral (anterior) Forearm <1 day              Line  Duration             Hemodialysis AV Fistula 11/09/20 Left Other (Comment) 1160 days                      Telemetry:  Telemetry Orders (From admission, onward)               24 Hour Telemetry Monitoring  Continuous x 24 Hours (Telem)        Question:  Reason for 24 Hour Telemetry  Answer:  TIA/Suspected CVA/ Confirmed CVA                              Imaging: Reviewed radiology reports from this admission including: CT head    Recent Cultures (last 7 days):         Last 24 Hours Medication List:   Current Facility-Administered Medications   Medication Dose Route Frequency Provider Last Rate    aspirin  81 mg Oral Daily Malu Moore PA-C      atorvastatin  40 mg Oral QPM Radha Steen MD      bisacodyl  10 mg Rectal Daily PRN Radha Steen MD      calcium acetate  667 mg Oral TID With Meals Radha Steen MD      cinacalcet  60 mg Oral Daily Radha Steen MD      cloNIDine  0.3 mg Transdermal Weekly Radha Steen MD      escitalopram  20 mg Oral Daily Radha Steen MD      famotidine  40 mg Oral Daily Radha Steen MD      gabapentin  300 mg Oral Daily Radha Steen MD      hydrALAZINE  10 mg Intravenous Q4H PRN Radha Steen MD      hydrALAZINE  100 mg Oral TID Quinten Henson MD      insulin lispro  300 Units Subcutaneous Insulin Pump Daily PRN Radha Steen MD      labetalol  10 mg Intravenous  Q6H PRN Radha Steen MD      labetalol  800 mg Oral TID Radha Steen MD      lisinopril  40 mg Oral Daily Quintensaba Henson MD      melatonin  6 mg Oral HS PRN Cinthia Dempsey MD      NIFEdipine  90 mg Oral HS Radha Steen MD      ondansetron  4 mg Intravenous Q4H PRN Radha Steen MD      oxyCODONE  2.5 mg Oral Q4H PRN Radha Steen MD      Or    oxyCODONE  5 mg Oral Q4H PRN Radha Steen MD      patient maintained insulin pump  1 each Subcutaneous Q8H Radha Steen MD      polyethylene glycol  17 g Oral Daily Radha Steen MD      prochlorperazine  5 mg Oral Q6H PRN Radha Steen MD      senna-docusate sodium  1 tablet Oral BID Radha Steen MD          Today, Patient Was Seen By: Mason Herr MD    **Please Note: This note may have been constructed using a voice recognition system.**

## 2024-01-13 NOTE — CONSULTS
Consultation - Neurology   Mateus Mckeon 38 y.o. male MRN: [id number]  Unit/Bed#: [facility] 718-01 Encounter: [id number]      Assessment/Plan   Right hand weakness  Assessment & Plan  38-year-old male with complex medical history:  -July 2015: Cerebellar stroke  -January 2018 pontine stroke  -ESRD on HD  -Type 1 diabetes  -Heterozygous prothrombin gene mutation ([id number])  -MTHFR homozygous C677T mutation  -February 2020: Seizure/status epilepticus concern,    Initially presented on 1/4 with several days of severe headache; initial neuroimaging with SAH and right basilar cistern; subsequent workup including MRI brain revealed multiple tiny R hemisphere recent acute/subacute infarcts; has undergone 2 cerebral angiograms this admission, both of which unrevealing.     Neurology asked to evaluate 1/13 for distal right upper extremity weakness; reportedly began post-angio yesterday 1/12:     1/13: neuro exam with notable hand /wrist ROM weakness, just minimal mid/proximal R UE giveaway weakness; fine motor clumsiness in R hand with coordination testing. R facial asymmetry noted (although per patient's father at bedside, does not appear an acute finding). Concerning for new/interim ischemia (recent R hemisphere infarcts on MRI from 1/8 would not correlate with focal deficits).    Neuroimaging thus far:  -CT head: Subarachnoid hemorrhage and right prepontine/pre-medullary cistern  -CTA head/neck: SAH right basilar cistern, no vascular malformation, patent dural venous sinuses, no significant flow restrictive disease/LVO.,  -Initial cerebral angio 1/5: normal   -MRI brain: Stable hemorrhage in right CP angle/prepontine/premedullary cisterns; few tiny scattered acute/early subacute infarcts in right corona radiata/centrum semiovale/right thalamus/right occipital lobe/bilateral cerebellum  -MRI cervical spine: No identifiable etiology for SAH.  Marrow edema at C5-6 with severe disc space narrowing,  -Repeat cerebral angio  "on 1/12: normal    -2D echocardiogram: EF 70%, systolic function vigorous, normal wall motion, no PFO, bilateral atrial dilation (mild-moderate)    Plan:  -would formally initiate stroke pathway: see extensive results/workup above  -primary team ordered repeat STAT CTA head/neck, will follow  -per Dr. Jenkins does not require formal stroke alert  -obtain repeat MRI brain without contrast   -Recent 2D echo results above  -Restarted home aspirin 81 mg daily yesterday  -Hemoglobin A1C 5.9  -Check lipid panel  -Frequent neuro checks  -Initiate telemetry monitoring  -Provide stroke education  -Therapy evaluations  -    * Subarachnoid hemorrhage (HCC)  Assessment & Plan  -extensive workup ongoing via neurosurgery  -see above under \"R hand weakness\" for workup including 2 cerebral angiograms  -status post transcranial dopplers/spasm watch  -restarted baby aspirin yesterday    Type 1 diabetes mellitus (HCC)  Assessment & Plan  Lab Results   Component Value Date    HGBA1C 5.9 (H) 01/07/2024       Recent Labs     01/12/24  2341 01/13/24  0014 01/13/24  0643 01/13/24  1146   POCGLU 133 116 147* 164*       Blood Sugar Average: Last 72 hrs:  (P) 191.8125    -euglycemic goals, management per primary team      End stage kidney disease (HCC)  Assessment & Plan  Lab Results   Component Value Date    EGFR 8 01/13/2024    EGFR 6 01/12/2024    EGFR 9 01/11/2024    CREATININE 7.07 (H) 01/13/2024    CREATININE 8.79 (H) 01/12/2024    CREATININE 6.69 (H) 01/11/2024     -on HD, nephrology following  -monitoring BMP/CMP  -avoid nephrotoxins      Will further discuss plan of care with attending neurologist.    Recommendations for outpatient neurological follow up have yet to be determined.    History of Present Illness     Reason for Consult / Principal Problem: Right upper extremity weakness, MRI brain with recent infarcts    HPI: Mateus Mckeon is a 38 y.o. male with history as mentioned above in assessment who neurology is asked to " "evaluate in regards to the above.    Upon discussing with patient as well as family at bedside this morning/afternoon: He noticed new onset of right hand and distal upper extremity weakness following his procedure yesterday.    In reviewing details from this admission: He was admitted back on January 4 after complaining of a severe headache for several days following a dialysis session.  He ultimately came to the ER where he was found to have subarachnoid hemorrhage on neuroimaging and admitted with extensive neurosurgical workup over the past week or so (CTA head/neck, MRI brain, and two cerebral angiograms with the most recent one being performed yesterday).    Aside from the right hand/distal upper extremity weakness, he denies any other focal deficits at present: Denies any headache, dysphagia, aphasia, focal sensory loss in an extremity, no right leg involvement.    In reviewing extensive history:  July 2015: Found to have cryptogenic cerebellar stroke, presenting with dizziness. He was found to have heterozygous prothrombin gene mutation as well as homozygous C677T MTHR mutation, elevated homocystine level.    January 2018  evaluated by neurology team for  dizziness, blurred vision, gaze abnormality.  Suspected to be a small pontine event;  Although no demyelinating workup was pursued with IV steroids as well as plasma exchange for several treatments.  Hematology/oncology evaluated patient during that admission and concurred with dual antiplatelet regimen without clear indication for anticoagulation at this time.    Carries smoking history, type I diabetic on insulin pump per family.     Per prior notes: \"seen by Hematology at [facility] on 3/28/2019, the significance of MTHFR mutation is unclear, heterozygous prothrombin gene is more concerning, no family history of VTE and has a greater risk \"    Inpatient consult to Neurology  Consult performed by: Willis Roamn PA-C  Consult ordered by: Mason Herr, " MD          Review of Systems   Constitutional:  Positive for fatigue. Negative for chills, diaphoresis and fever.   HENT: Negative.     Respiratory: Negative.     Cardiovascular: Negative.    Gastrointestinal: Negative.    Musculoskeletal: Negative.    Neurological:  Positive for weakness. Negative for dizziness, tremors, seizures, syncope, facial asymmetry, speech difficulty, light-headedness, numbness and headaches.       Historical Information   Past Medical History:   Diagnosis Date    Acute kidney injury (HCC)     Ambulates with cane     Anuria     Anxiety     Cellulitis of right elbow 03/31/2021    Chronic kidney disease     Depression     Diabetes mellitus (HCC)     Diarrhea     Emesis 10/24/2020    End stage renal disease (HCC) 02/11/2018    Formatting of this note might be different from the original. Last Assessment & Plan:  Secondary to DM.  On nightly PD.  Followed by Nephro.  [facility] Formatting of this note might be different from the original. Last Assessment & Plan:  Formatting of this note might be different from the original. 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    Esophagitis 07/21/2015    Falls     Gastroparesis     GERD (gastroesophageal reflux disease)     History of shingles 2010    History of transfusion 02/2018    no adverse reaction    Hyperlipidemia     Hyperphosphatemia     Hypertension     Hypoglycemia 07/15/2022    Itching     Mastoiditis of right side 07/15/2022    Muscle weakness     general unsteadiness    Obesity (BMI 30.0-34.9) 09/09/2019    Orthostatic hypotension 10/25/2020    Peripheral polyneuropathy 11/20/2019    PONV (postoperative nausea and vomiting) 01/26/2018    Protein-calorie malnutrition (HCC) 11/23/2020    Recurrent peritonitis (HCC) due to peritoneal dialysis catheter 07/31/2020    Retinopathy     Seizures (HCC)     early 2020 - one time    Skin abnormality      some dime size areas where skin was scratched from itching    Spontaneous bacterial peritonitis (HCC) 10/19/2020    Squamous cell skin cancer     left temple    Stroke (HCC)     x2 - off balance/no driving/fatigue    Swelling of both lower extremities     Traumatic onycholysis 07/21/2022    Vomiting     Wears glasses      Past Surgical History:   Procedure Laterality Date    CARDIAC ELECTROPHYSIOLOGY PROCEDURE N/A 9/21/2023    Procedure: Cardiac loop recorder explant;  Surgeon: Parish Morgan MD;  Location: [facility];  Service: Cardiology    CARDIAC LOOP RECORDER  05/2018    COLONOSCOPY      EGD      EYE SURGERY Right     IR AV FISTULAGRAM/GRAFTOGRAM  02/23/2021    IR CEREBRAL ANGIOGRAPHY / INTERVENTION  1/5/2024    IR TUNNELED CENTRAL LINE PLACEMENT  02/16/2021    IR TUNNELED DIALYSIS CATHETER PLACEMENT  11/18/2020    IR TUNNELED DIALYSIS CATHETER REMOVAL  02/12/2021    IR TUNNELED DIALYSIS CATHETER REMOVAL  03/11/2021    MOHS SURGERY Left 12/14/2022    Left temple with Dr. Hassan    PERITONEAL CATHETER INSERTION N/A 08/27/2018    Procedure: UNROOF PD CATHETER;  Surgeon: Felipe Lindo DO;  Location: [facility];  Service: General    OR ARTERIOVENOUS ANASTOMOSIS OPEN DIRECT Left 11/09/2020    Procedure: CREATION FISTULA  ARTERIOVENOUS (AV) - LEFT WRIST;  Surgeon: Placido Altamirano MD;  Location: [facility];  Service: Vascular    OR ESOPHAGOGASTRODUODENOSCOPY TRANSORAL DIAGNOSTIC N/A 04/18/2019    Procedure: ESOPHAGOGASTRODUODENOSCOPY (EGD);  Surgeon: Ale Figueroa MD;  Location: [facility];  Service: Gastroenterology    OR LAPS INSERTION TUNNELED INTRAPERITONEAL CATHETER N/A 08/06/2018    Procedure: LAPAROSCOPIC PD CATHETER PLACEMENT;  Surgeon: Felipe Lindo DO;  Location: [facility];  Service: General    OR REMOVAL TUNNELED INTRAPERITONEAL CATHETER N/A 11/18/2020    Procedure: REMOVAL CATHETER PERITONEAL DIALYSIS;  Surgeon: Abdifatah Ty MD;  Location: [facility];  Service: General    TONSILLECTOMY       UPPER GASTROINTESTINAL ENDOSCOPY       Social History   Social History     Substance and Sexual Activity   Alcohol Use Not Currently     Social History     Substance and Sexual Activity   Drug Use Yes    Types: Marijuana    Comment: medical marijuana     E-Cigarette/Vaping    E-Cigarette Use Current Every Day User     Comments medical marijuana      E-Cigarette/Vaping Substances    Nicotine No     THC Yes     CBD Yes     Flavoring No     Other No     Unknown No      Social History     Tobacco Use   Smoking Status Former    Current packs/day: 0.00    Average packs/day: 0.5 packs/day for 12.0 years (6.0 ttl pk-yrs)    Types: Cigarettes    Start date: 2006    Quit date: 2018    Years since quittin.9   Smokeless Tobacco Never   Tobacco Comments    quit 2018     Family History:   Family History   Problem Relation Age of Onset    Breast cancer Mother     Hypertension Mother     Hyperlipidemia Father     Hypertension Father     Leukemia Maternal Grandmother     Hyperlipidemia Maternal Grandfather     Hypertension Maternal Grandfather     Hyperlipidemia Paternal Grandmother     Hypertension Paternal Grandmother     Heart disease Paternal Grandfather         cardiac disorder    Diabetes Paternal Grandfather        Review of previous medical records was completed.     Meds/Allergies   current meds:   Current Facility-Administered Medications   Medication Dose Route Frequency    acetaminophen (TYLENOL) tablet 975 mg  975 mg Oral Q6H HALIE    aspirin chewable tablet 81 mg  81 mg Oral Daily    atorvastatin (LIPITOR) tablet 40 mg  40 mg Oral QPM    bisacodyl (DULCOLAX) rectal suppository 10 mg  10 mg Rectal Daily PRN    calcium acetate (PHOSLO) capsule 667 mg  667 mg Oral TID With Meals    cinacalcet (SENSIPAR) tablet 60 mg  60 mg Oral Daily    cloNIDine (CATAPRES-TTS-3) 0.3 mg/24 hr TD weekly patch  0.3 mg Transdermal Weekly    escitalopram (LEXAPRO) tablet 20 mg  20 mg Oral Daily    famotidine (PEPCID) tablet 40 mg   40 mg Oral Daily    gabapentin (NEURONTIN) capsule 300 mg  300 mg Oral Daily    hydrALAZINE (APRESOLINE) injection 10 mg  10 mg Intravenous Q4H PRN    hydrALAZINE (APRESOLINE) tablet 100 mg  100 mg Oral TID    insulin lispro (HumaLOG) FOR PUMP REFILLS 300 Units  300 Units Subcutaneous Insulin Pump Daily PRN    labetalol (NORMODYNE) injection 10 mg  10 mg Intravenous Q6H PRN    labetalol (NORMODYNE) tablet 800 mg  800 mg Oral TID    lisinopril (ZESTRIL) tablet 40 mg  40 mg Oral Daily    melatonin tablet 6 mg  6 mg Oral HS PRN    NIFEdipine (PROCARDIA XL) 24 hr tablet 90 mg  90 mg Oral HS    ondansetron (ZOFRAN) injection 4 mg  4 mg Intravenous Q4H PRN    oxyCODONE (ROXICODONE) split tablet 2.5 mg  2.5 mg Oral Q4H PRN    Or    oxyCODONE (ROXICODONE) IR tablet 5 mg  5 mg Oral Q4H PRN    PATIENT MAINTAINED INSULIN PUMP 1 each  1 each Subcutaneous Q8H    polyethylene glycol (MIRALAX) packet 17 g  17 g Oral Daily    prochlorperazine (COMPAZINE) tablet 5 mg  5 mg Oral Q6H PRN    senna-docusate sodium (SENOKOT S) 8.6-50 mg per tablet 1 tablet  1 tablet Oral BID    and PTA meds:   Prior to Admission Medications   Prescriptions Last Dose Informant Patient Reported? Taking?   B-D ULTRAFINE III SHORT PEN 31G X 8 MM MISC  Self No No   Sig: USE 1 PEN NEEDLE 8 TIMES DAILY   GLUCAGON EMERGENCY 1 MG injection  Self Yes No   Gvoke HypoPen 1-Pack 1 MG/0.2ML SOAJ  Self Yes No   Insulin Disposable Pump (Omnipod 5 G6 Intro, Gen 5,) KIT  Self Yes No   Insulin Disposable Pump (Omnipod 5 G6 Pod, Gen 5,) MISC  Self Yes No   NIFEdipine (PROCARDIA XL) 90 mg 24 hr tablet  Self No No   Sig: Take 1 tablet (90 mg total) by mouth daily   NovoLOG 100 UNIT/ML injection  Self Yes No   Patiromer Sorbitex Calcium (VELTASSA PO)  Self Yes No   Sig: Take 5 g by mouth once a week   SPS 15 GM/60ML suspension  Self Yes No   Sig: TAKE 60 ML BY MOUTH SINGLE DOSE FOR EMERGENCY USE DURING MISSED HEMODIALYSIS   Velphoro 500 MG CHEW   Yes No   Sig: CRUSH OR CHEW  AND SWALLOW 2 TABLETS 3 TIMES A DAY WITH MEALS   aspirin (ECOTRIN LOW STRENGTH) 81 mg EC tablet  Self Yes No   Sig: Take 81 mg by mouth daily   atorvastatin (LIPITOR) 40 mg tablet   No No   Sig: Take 1 tablet (40 mg total) by mouth every evening   b complex vitamins capsule  Self Yes No   Sig: Take 1 capsule by mouth daily before lunch    calcium acetate (PHOSLO) capsule  Self Yes No   Sig: TAKE 3 CAPSULES BY MOUTH WITH MEALS   cinacalcet (SENSIPAR) 60 MG tablet  Self Yes No   Sig: Take 120 mg by mouth daily 2 tab daily    cloNIDine (CATAPRES-TTS-3) 0.3 mg/24 hr  Self Yes No   Si patch once a week   escitalopram (LEXAPRO) 20 mg tablet  Self No No   Sig: Take 1 tablet (20 mg total) by mouth daily   famotidine (PEPCID) 40 MG tablet   No No   Sig: TAKE 1 TABLET BY MOUTH IN THE MORNING   gabapentin (NEURONTIN) 100 mg capsule   No No   Sig: TAKE 2 CAPSULES BY MOUTH AT BEDTIME   hydrALAZINE (APRESOLINE) 25 mg tablet  Self No No   Sig: Take 1 tablet (25 mg total) by mouth 3 (three) times a day   hydrOXYzine HCL (ATARAX) 10 mg tablet  Self No No   Sig: Take 1 tablet (10 mg total) by mouth every 6 (six) hours as needed for itching or anxiety   labetalol (NORMODYNE) 200 mg tablet  Self No No   Sig: TAKE 2 TABLETS BY MOUTH THREE TIMES DAILY   lisinopril (ZESTRIL) 40 mg tablet  Self Yes No   Sig: Take 80 mg by mouth daily   mupirocin (BACTROBAN) 2 % ointment  Self No No   Sig: Apply topically 3 (three) times a day Until lesion on right hand heals.   ondansetron (ZOFRAN) 4 mg tablet  Self No No   Sig: Take 1 tablet (4 mg total) by mouth every 8 (eight) hours as needed for nausea or vomiting   saccharomyces boulardii (FLORASTOR) 250 mg capsule  Self Yes No   Sig: Take 250 mg by mouth daily   traZODone (DESYREL) 50 mg tablet  Self No No   Sig: Take 0.5 tablets (25 mg total) by mouth daily at bedtime as needed for sleep   triamcinolone (KENALOG) 0.1 % ointment  Self No No   Sig: Apply topically 2 (two) times a day For up to 14  "days on the itchy areas. Avoid groin, underarms and face. It is important to take at least 1 week break between uses.      Facility-Administered Medications: None       Allergies   Allergen Reactions    Sulfa Antibiotics Rash       Objective   Vitals:Blood pressure 169/77, pulse 69, temperature 98.8 °F (37.1 °C), temperature source Oral, resp. rate 16, height 5' 5\" (1.651 m), weight 95.1 kg (209 lb 10.5 oz), SpO2 98%.,Body mass index is 34.89 kg/m².    Intake/Output Summary (Last 24 hours) at 1/13/2024 1006  Last data filed at 1/12/2024 2001  Gross per 24 hour   Intake 350 ml   Output 806 ml   Net -456 ml       Invasive Devices:   Invasive Devices       Peripheral Intravenous Line  Duration             Peripheral IV 01/13/24 Left;Ventral (anterior) Forearm <1 day              Line  Duration             Hemodialysis AV Fistula 11/09/20 Left Other (Comment) 1159 days                    Physical Exam  Constitutional:       Appearance: Normal appearance.   HENT:      Head: Normocephalic and atraumatic.   Eyes:      Extraocular Movements: Extraocular movements intact and EOM normal.      Conjunctiva/sclera: Conjunctivae normal.      Pupils: Pupils are equal, round, and reactive to light.   Cardiovascular:      Rate and Rhythm: Normal rate.   Pulmonary:      Effort: Pulmonary effort is normal.   Abdominal:      General: There is no distension.   Musculoskeletal:         General: Normal range of motion.      Cervical back: Normal range of motion and neck supple.   Skin:     General: Skin is warm and dry.   Neurological:      Mental Status: He is alert and oriented to person, place, and time.      Deep Tendon Reflexes:      Reflex Scores:       Bicep reflexes are 2+ on the right side and 2+ on the left side.       Brachioradialis reflexes are 2+ on the right side and 2+ on the left side.       Patellar reflexes are 2+ on the right side and 2+ on the left side.       Achilles reflexes are 2+ on the right side and 2+ on the " left side.  Psychiatric:         Speech: Speech normal.       Neurologic Exam     Mental Status   Oriented to person, place, and time.   Follows 2 step commands.   Attention: normal. Concentration: normal.   Speech: speech is normal   Normal comprehension.     Cranial Nerves     CN II   Visual fields full to confrontation.     CN III, IV, VI   Pupils are equal, round, and reactive to light.  Extraocular motions are normal.     CN V   Facial sensation intact.     CN VII   Right facial weakness: R lower facial asymmetry.    CN VIII   CN VIII normal.     CN IX, X   CN IX normal.   CN X normal.     CN XI   CN XI normal.     CN XII   CN XII normal.     Motor Exam   Muscle bulk: normal  Overall muscle tone: normal  Full strength throughout L UE and bilateral LE.    R UE: mild weakness deltoid, bicep (4/5), triceps 4+/5, more notable weakness with wrist flexion/extension (1/5) and  (1/5).     Sensory Exam   Light touch normal.     No ernestine-neglect/extinction.      Gait, Coordination, and Reflexes     Tremor   Resting tremor: absent  Intention tremor: absent    Reflexes   Right brachioradialis: 2+  Left brachioradialis: 2+  Right biceps: 2+  Left biceps: 2+  Right patellar: 2+  Left patellar: 2+  Right achilles: 2+  Left achilles: 2+  Right plantar: equivocal  Left plantar: equivocal  Right ankle clonus: absent  Left ankle clonus: absent  Clumsy with finger to nose on R, difficult fine motor tasks with R hand/fingers/wrist.        Lab Results: CBC:   Results from last 7 days   Lab Units 01/13/24  0548 01/12/24  0623 01/10/24  0557   WBC Thousand/uL 7.83 7.25 6.49   RBC Million/uL 3.01* 3.02* 2.60*   HEMOGLOBIN g/dL 9.5* 9.4* 8.2*   HEMATOCRIT % 28.5* 28.4* 24.9*   MCV fL 95 94 96   PLATELETS Thousands/uL 273 260 192   , BMP/CMP:   Results from last 7 days   Lab Units 01/13/24  0548 01/12/24  0623 01/11/24  0522   SODIUM mmol/L 138 136 138   POTASSIUM mmol/L 4.3 4.4 4.1   CHLORIDE mmol/L 98 97 99   CO2 mmol/L 27 26 28    BUN mg/dL 29* 37* 25   CREATININE mg/dL 7.07* 8.79* 6.69*   CALCIUM mg/dL 7.7* 8.3* 8.2*   AST U/L  --   --  13   ALT U/L  --   --  9   ALK PHOS U/L  --   --  64   EGFR ml/min/1.73sq m 8 6 9   , Vitamin B12:   Results from last 7 days   Lab Units 01/07/24  0541   VITAMIN B 12 pg/mL 1,427*   , HgBA1C:   Results from last 7 days   Lab Units 01/07/24  0541   HEMOGLOBIN A1C % 5.9*   , TSH:   Results from last 7 days   Lab Units 01/07/24  0541   TSH 3RD GENERATON uIU/mL 3.866   , Coagulation:   Results from last 7 days   Lab Units 01/12/24  0623   INR  1.05     Imaging Studies: I have personally reviewed pertinent films in PACS  CTA head and neck w wo contrast   Final Result by Justin Ho MD (01/13 1415)      CT brain:  Improved scattered subarachnoid hemorrhages (better delineated on the recent prior MRI examination) with no new intra or extra-axial hemorrhage.      CTA head: Negative for large vessel intracranial occlusion or hemodynamically significant stenosis.      CTA neck:  No extracranial carotid stenosis.  The cervical vertebral arteries are patent.         Workstation performed: [id number]         VAS transcranial doppler, complete study   Final Result by Dayne Burden DO (01/13 1147)      VAS transcranial doppler, complete study   Final Result by Ashley Poe MD (01/11 1121)      VAS transcranial doppler, complete study   Final Result by Ashley Poe MD (01/10 0394)      CT head wo contrast   Final Result by Makenzie Calvin MD (01/09 5583)      Improving subarachnoid hemorrhage compared to recent prior studies. No new findings.      Other stable and nonemergent findings above.         Workstation performed: [id number]         VAS transcranial doppler, complete study   Final Result by Dennis Paul DO (01/09 1147)      VAS transcranial doppler, complete study   Final Result by Dennis Paul DO (01/08 8864)      MRI brain w wo contrast   Final Result by E. Alec Schoenberger, MD (01/08 0859)       Stable hematoma in the right CP angle, prepontine and premedullary cisterns. New subarachnoid hemorrhage over the convexities and within the lateral ventricles likely due to redistribution.   Few tiny scattered recent infarcts in multiple vascular distributions.      Study marked in epic for notification.      Workstation performed: [id number]         MRI cervical spine w wo contrast   Final Result by E. Alec Schoenberger, MD (01/08 0858)      No identifiable etiology for subarachnoid hemorrhage.   Mild congenital canal stenosis.   New marrow edema at C5-C6 with severe disc space narrowing that is similar to recent CT. Modic type I endplate degenerative changes favored over infection but recommend clinical correlation and follow-up imaging if warranted.      Study marked in epic for notification.      Workstation performed: [id number]         VAS transcranial doppler, complete study   Final Result by Priscila Chavez MD (01/07 1736)      VAS transcranial doppler, complete study   Final Result by Priscila Chavez MD (01/07 1736)      IR cerebral angiography / intervention   Final Result by Yanira Moss (01/08 0547)      VAS transcranial doppler, complete study   Final Result by Dayne Burden DO (01/05 1442)      IR cerebral angiography    (Results Pending)   VAS transcranial doppler, complete study    (Results Pending)   MRA head wo contrast    (Results Pending)   MRI brain w wo contrast    (Results Pending)   VAS transcranial doppler, complete study    (Results Pending)   MRI Inpatient Order    (Results Pending)     EKG, Pathology, and Other Studies: I have personally reviewed pertinent reports.      VTE Prophylaxis: Sequential compression device (Venodyne)     Code Status: Level 1 - Full Code    Total time spent today 55 minutes. Discussed plan of care with patient/family and primary team: await MRI brain/stroke workup, concern for possible new ischemia/stroke based on acute change/focal neuro exam  findings.    Please note dictation software was used in the formulation of this note. Please keep that in mind in light of any grammatical errors.

## 2024-01-13 NOTE — ASSESSMENT & PLAN NOTE
Lab Results   Component Value Date    EGFR 8 01/13/2024    EGFR 6 01/12/2024    EGFR 9 01/11/2024    CREATININE 7.07 (H) 01/13/2024    CREATININE 8.79 (H) 01/12/2024    CREATININE 6.69 (H) 01/11/2024     -on HD, nephrology following  -monitoring BMP/CMP  -avoid nephrotoxins

## 2024-01-13 NOTE — ASSESSMENT & PLAN NOTE
New onset RUE weakness. Per patient started 1/12. Also with mild facial droop highly concerning for new stroke.   D/w neurology. Consult placed.  Obtain CTA H/N and MRI brain w/o contrast.  Continue stroke pathway  ASA 81 mg daily and atorvastatin 40mg daily.   Telemetry.  Echo 1/5 no PFO

## 2024-01-13 NOTE — ASSESSMENT & PLAN NOTE
"-extensive workup ongoing via neurosurgery  -see above under \"R hand weakness\" for workup including 2 cerebral angiograms  -status post transcranial dopplers/spasm watch  -restarted baby aspirin on 1/12  "

## 2024-01-13 NOTE — ASSESSMENT & PLAN NOTE
38-year-old male with complex medical history:  -July 2015: Cerebellar stroke  -January 2018 pontine stroke  -ESRD on HD  -Type 1 diabetes  -Heterozygous prothrombin gene mutation ([id number])  -MTHFR homozygous C677T mutation  -February 2020: Seizure/status epilepticus concern,    Initially presented on 1/4 with several days of severe headache; initial neuroimaging with SAH and right basilar cistern; subsequent workup including MRI brain revealed multiple tiny R hemisphere recent acute/subacute infarcts; has undergone 2 cerebral angiograms this admission, both of which unrevealing.     Neurology asked to evaluate 1/13 for distal right upper extremity weakness; reportedly began post-angio yesterday 1/12: Concerning for new/interim ischemia (recent R hemisphere infarcts on MRI from 1/8 would not correlate with focal deficits).    MRI brain today 1/14: with new/interim bi-hemisphere areas of infarct in differing vascular territories, new since prior MRI brain on 1/8. With timing of symptoms (onset following angiogram), suspect new strokes may have been related to cerebral angiogram performed on 1/12.     Neuroimaging thus far:  -CT head: Subarachnoid hemorrhage and right prepontine/pre-medullary cistern  -CTA head/neck: SAH right basilar cistern, no vascular malformation, patent dural venous sinuses, no significant flow restrictive disease/LVO.,  -Initial cerebral angio 1/5: normal   -MRI brain: Stable hemorrhage in right CP angle/prepontine/premedullary cisterns; few tiny scattered acute/early subacute infarcts in right corona radiata/centrum semiovale/right thalamus/right occipital lobe/bilateral cerebellum  -MRI cervical spine: No identifiable etiology for SAH.  Marrow edema at C5-6 with severe disc space narrowing,  -Repeat cerebral angio on 1/12: normal    -2D echocardiogram (1/5): EF 70%, systolic function vigorous, normal wall motion, no PFO, bilateral atrial dilation (mild-moderate)  -CTA head/neck 1/13: no acute  vascular findings; no LVO/critical stenosis, improved scattered SAH  -MRI brain 1/14: as mentioned above, acute muti-focal infarcts, bihemispheric    Plan:  -stroke pathway: see extensive results/workup above  -neurodiagnostics above  -Recent 2D echo results above  -Restarted home aspirin 81 mg daily yesterday, continue for secondary stroke prevention  -continue lipitor 40 mg daily  -Hemoglobin A1C 5.9  -Lipid panel with LDL of 29  -Frequent neuro checks  -Telemetry monitoring  -Provide stroke education  -Therapy evaluations

## 2024-01-13 NOTE — QUICK NOTE
Would need dialysis for 3 consecutive sessions if he were to have an MRI with michael to prevent NSF.

## 2024-01-13 NOTE — PLAN OF CARE
Problem: PAIN - ADULT  Goal: Verbalizes/displays adequate comfort level or baseline comfort level  Description: Interventions:  - Encourage patient to monitor pain and request assistance  - Assess pain using appropriate pain scale  - Administer analgesics based on type and severity of pain and evaluate response  - Implement non-pharmacological measures as appropriate and evaluate response  - Consider cultural and social influences on pain and pain management  - Notify physician/advanced practitioner if interventions unsuccessful or patient reports new pain  Outcome: Progressing     Problem: INFECTION - ADULT  Goal: Absence or prevention of progression during hospitalization  Description: INTERVENTIONS:  - Assess and monitor for signs and symptoms of infection  - Monitor lab/diagnostic results  - Monitor all insertion sites, i.e. indwelling lines, tubes, and drains  - Monitor endotracheal if appropriate and nasal secretions for changes in amount and color  - [place] appropriate cooling/warming therapies per order  - Administer medications as ordered  - Instruct and encourage patient and family to use good hand hygiene technique  - Identify and instruct in appropriate isolation precautions for identified infection/condition  Outcome: Progressing     Problem: SAFETY ADULT  Goal: Patient will remain free of falls  Description: INTERVENTIONS:  - Educate patient/family on patient safety including physical limitations  - Instruct patient to call for assistance with activity   - Consult OT/PT to assist with strengthening/mobility   - Keep Call bell within reach  - Keep bed low and locked with side rails adjusted as appropriate  - Keep care items and personal belongings within reach  - Initiate and maintain comfort rounds  - Make Fall Risk Sign visible to staff  - Apply yellow socks and bracelet for high fall risk patients  - Consider moving patient to room near nurses station  Outcome: Progressing  Goal: Maintain or  return to baseline ADL function  Description: INTERVENTIONS:  -  Assess patient's ability to carry out ADLs; assess patient's baseline for ADL function and identify physical deficits which impact ability to perform ADLs (bathing, care of mouth/teeth, toileting, grooming, dressing, etc.)  - Assess/evaluate cause of self-care deficits   - Assess range of motion  - Assess patient's mobility; develop plan if impaired  - Assess patient's need for assistive devices and provide as appropriate  - Encourage maximum independence but intervene and supervise when necessary  - Involve family in performance of ADLs  - Assess for home care needs following discharge   - Consider OT consult to assist with ADL evaluation and planning for discharge  - Provide patient education as appropriate  Outcome: Progressing  Goal: Maintains/Returns to pre admission functional level  Description: INTERVENTIONS:  - Perform AM-PAC 6 Click Basic Mobility/ Daily Activity assessment daily.  - Set and communicate daily mobility goal to care team and patient/family/caregiver.   - Collaborate with rehabilitation services on mobility goals if consulted  - Out of bed for toileting  - Record patient progress and toleration of activity level   Outcome: Progressing     Problem: DISCHARGE PLANNING  Goal: Discharge to home or other facility with appropriate resources  Description: INTERVENTIONS:  - Identify barriers to discharge w/patient and caregiver  - Arrange for needed discharge resources and transportation as appropriate  - Identify discharge learning needs (meds, wound care, etc.)  - Arrange for interpretive services to assist at discharge as needed  - Refer to Case Management Department for coordinating discharge planning if the patient needs post-hospital services based on physician/advanced practitioner order or complex needs related to functional status, cognitive ability, or social support system  Outcome: Progressing     Problem: Knowledge  Deficit  Goal: Patient/family/caregiver demonstrates understanding of disease process, treatment plan, medications, and discharge instructions  Description: Complete learning assessment and assess knowledge base.  Interventions:  - Provide teaching at level of understanding  - Provide teaching via preferred learning methods  Outcome: Progressing     Problem: Nutrition/Hydration-ADULT  Goal: Nutrient/Hydration intake appropriate for improving, restoring or maintaining nutritional needs  Description: Monitor and assess patient's nutrition/hydration status for malnutrition. Collaborate with interdisciplinary team and initiate plan and interventions as ordered.  Monitor patient's weight and dietary intake as ordered or per policy. Utilize nutrition screening tool and intervene as necessary. Determine patient's food preferences and provide high-protein, high-caloric foods as appropriate.     INTERVENTIONS:  - Monitor oral intake, urinary output, labs, and treatment plans  - Assess nutrition and hydration status and recommend course of action  - Evaluate amount of meals eaten  - Assist patient with eating if necessary   - Allow adequate time for meals  - Recommend/ encourage appropriate diets, oral nutritional supplements, and vitamin/mineral supplements  - Order, calculate, and assess calorie counts as needed  - Recommend, monitor, and adjust tube feedings and TPN/PPN based on assessed needs  - Assess need for intravenous fluids  - Provide specific nutrition/hydration education as appropriate  - Include patient/family/caregiver in decisions related to nutrition  Outcome: Progressing     Problem: METABOLIC, FLUID AND ELECTROLYTES - ADULT  Goal: Electrolytes maintained within normal limits  Description: INTERVENTIONS:  - Monitor labs and assess patient for signs and symptoms of electrolyte imbalances  - Administer electrolyte replacement as ordered  - Monitor response to electrolyte replacements, including repeat lab  results as appropriate  - Instruct patient on fluid and nutrition as appropriate  Outcome: Progressing  Goal: Fluid balance maintained  Description: INTERVENTIONS:  - Monitor labs   - Monitor I/O and WT  - Instruct patient on fluid and nutrition as appropriate  - Assess for signs & symptoms of volume excess or deficit  Outcome: Progressing     Problem: Prexisting or High Potential for Compromised Skin Integrity  Goal: Skin integrity is maintained or improved  Description: INTERVENTIONS:  - Identify patients at risk for skin breakdown  - Assess and monitor skin integrity  - Assess and monitor nutrition and hydration status  - Monitor labs   - Assess for incontinence   - Turn and reposition patient  - Assist with mobility/ambulation  - Relieve pressure over bony prominences  - Avoid friction and shearing  - Provide appropriate hygiene as needed including keeping skin clean and dry  - Evaluate need for skin moisturizer/barrier cream  - Collaborate with interdisciplinary team   - Patient/family teaching  - Consider wound care consult   Outcome: Progressing

## 2024-01-14 ENCOUNTER — APPOINTMENT (OUTPATIENT)
Dept: RADIOLOGY | Facility: HOSPITAL | Age: 39
DRG: 064 | End: 2024-01-14
Payer: MEDICARE

## 2024-01-14 ENCOUNTER — APPOINTMENT (INPATIENT)
Dept: NON INVASIVE DIAGNOSTICS | Facility: HOSPITAL | Age: 39
DRG: 064 | End: 2024-01-14
Payer: MEDICARE

## 2024-01-14 PROBLEM — I63.9 ACUTE CVA (CEREBROVASCULAR ACCIDENT) (HCC): Status: ACTIVE | Noted: 2024-01-13

## 2024-01-14 LAB
ANION GAP SERPL CALCULATED.3IONS-SCNC: 16 MMOL/L
BUN SERPL-MCNC: 39 MG/DL (ref 5–25)
CALCIUM SERPL-MCNC: 7.9 MG/DL (ref 8.4–10.2)
CHLORIDE SERPL-SCNC: 97 MMOL/L (ref 96–108)
CHOLEST SERPL-MCNC: 88 MG/DL
CO2 SERPL-SCNC: 23 MMOL/L (ref 21–32)
CREAT SERPL-MCNC: 9.56 MG/DL (ref 0.6–1.3)
ERYTHROCYTE [DISTWIDTH] IN BLOOD BY AUTOMATED COUNT: 14.2 % (ref 11.6–15.1)
EST. AVERAGE GLUCOSE BLD GHB EST-MCNC: 126 MG/DL
GFR SERPL CREATININE-BSD FRML MDRD: 6 ML/MIN/1.73SQ M
GLUCOSE SERPL-MCNC: 106 MG/DL (ref 65–140)
GLUCOSE SERPL-MCNC: 116 MG/DL (ref 65–140)
GLUCOSE SERPL-MCNC: 145 MG/DL (ref 65–140)
GLUCOSE SERPL-MCNC: 171 MG/DL (ref 65–140)
GLUCOSE SERPL-MCNC: 175 MG/DL (ref 65–140)
GLUCOSE SERPL-MCNC: 225 MG/DL (ref 65–140)
GLUCOSE SERPL-MCNC: 236 MG/DL (ref 65–140)
HBA1C MFR BLD: 6 %
HCT VFR BLD AUTO: 28.6 % (ref 36.5–49.3)
HDLC SERPL-MCNC: 33 MG/DL
HGB BLD-MCNC: 9.7 G/DL (ref 12–17)
LDLC SERPL CALC-MCNC: 29 MG/DL (ref 0–100)
MCH RBC QN AUTO: 32.3 PG (ref 26.8–34.3)
MCHC RBC AUTO-ENTMCNC: 33.9 G/DL (ref 31.4–37.4)
MCV RBC AUTO: 95 FL (ref 82–98)
PLATELET # BLD AUTO: 257 THOUSANDS/UL (ref 149–390)
PMV BLD AUTO: 10.9 FL (ref 8.9–12.7)
POTASSIUM SERPL-SCNC: 4.5 MMOL/L (ref 3.5–5.3)
RBC # BLD AUTO: 3 MILLION/UL (ref 3.88–5.62)
SODIUM SERPL-SCNC: 136 MMOL/L (ref 135–147)
TRIGL SERPL-MCNC: 128 MG/DL
WBC # BLD AUTO: 9.53 THOUSAND/UL (ref 4.31–10.16)

## 2024-01-14 PROCEDURE — 85027 COMPLETE CBC AUTOMATED: CPT | Performed by: INTERNAL MEDICINE

## 2024-01-14 PROCEDURE — 82948 REAGENT STRIP/BLOOD GLUCOSE: CPT

## 2024-01-14 PROCEDURE — 82088 ASSAY OF ALDOSTERONE: CPT | Performed by: INTERNAL MEDICINE

## 2024-01-14 PROCEDURE — 83036 HEMOGLOBIN GLYCOSYLATED A1C: CPT | Performed by: INTERNAL MEDICINE

## 2024-01-14 PROCEDURE — 97535 SELF CARE MNGMENT TRAINING: CPT

## 2024-01-14 PROCEDURE — 80061 LIPID PANEL: CPT | Performed by: INTERNAL MEDICINE

## 2024-01-14 PROCEDURE — 99233 SBSQ HOSP IP/OBS HIGH 50: CPT | Performed by: INTERNAL MEDICINE

## 2024-01-14 PROCEDURE — 70551 MRI BRAIN STEM W/O DYE: CPT

## 2024-01-14 PROCEDURE — 80048 BASIC METABOLIC PNL TOTAL CA: CPT | Performed by: INTERNAL MEDICINE

## 2024-01-14 PROCEDURE — 99232 SBSQ HOSP IP/OBS MODERATE 35: CPT | Performed by: PHYSICIAN ASSISTANT

## 2024-01-14 PROCEDURE — 84244 ASSAY OF RENIN: CPT | Performed by: INTERNAL MEDICINE

## 2024-01-14 PROCEDURE — 99232 SBSQ HOSP IP/OBS MODERATE 35: CPT | Performed by: PSYCHIATRY & NEUROLOGY

## 2024-01-14 PROCEDURE — 97110 THERAPEUTIC EXERCISES: CPT

## 2024-01-14 RX ORDER — NIFEDIPINE 30 MG/1
30 TABLET, EXTENDED RELEASE ORAL ONCE
Status: COMPLETED | OUTPATIENT
Start: 2024-01-14 | End: 2024-01-14

## 2024-01-14 RX ORDER — NIFEDIPINE 30 MG/1
60 TABLET, EXTENDED RELEASE ORAL EVERY 12 HOURS
Status: DISCONTINUED | OUTPATIENT
Start: 2024-01-14 | End: 2024-01-15

## 2024-01-14 RX ADMIN — CALCIUM ACETATE 667 MG: 667 CAPSULE ORAL at 18:04

## 2024-01-14 RX ADMIN — SENNOSIDES, DOCUSATE SODIUM 1 TABLET: 8.6; 5 TABLET ORAL at 18:04

## 2024-01-14 RX ADMIN — CALCIUM ACETATE 667 MG: 667 CAPSULE ORAL at 08:20

## 2024-01-14 RX ADMIN — HYDRALAZINE HYDROCHLORIDE 100 MG: 50 TABLET, FILM COATED ORAL at 18:04

## 2024-01-14 RX ADMIN — LISINOPRIL 40 MG: 20 TABLET ORAL at 08:21

## 2024-01-14 RX ADMIN — ATORVASTATIN CALCIUM 40 MG: 40 TABLET, FILM COATED ORAL at 18:04

## 2024-01-14 RX ADMIN — HYDRALAZINE HYDROCHLORIDE 20 MG: 20 INJECTION, SOLUTION INTRAMUSCULAR; INTRAVENOUS at 22:36

## 2024-01-14 RX ADMIN — NIFEDIPINE 60 MG: 30 TABLET, FILM COATED, EXTENDED RELEASE ORAL at 21:06

## 2024-01-14 RX ADMIN — HYDRALAZINE HYDROCHLORIDE 100 MG: 50 TABLET, FILM COATED ORAL at 08:21

## 2024-01-14 RX ADMIN — LABETALOL HYDROCHLORIDE 800 MG: 200 TABLET, FILM COATED ORAL at 21:06

## 2024-01-14 RX ADMIN — HYDRALAZINE HYDROCHLORIDE 20 MG: 20 INJECTION, SOLUTION INTRAMUSCULAR; INTRAVENOUS at 01:58

## 2024-01-14 RX ADMIN — NIFEDIPINE 30 MG: 30 TABLET, FILM COATED, EXTENDED RELEASE ORAL at 14:26

## 2024-01-14 RX ADMIN — CINACALCET 60 MG: 30 TABLET ORAL at 08:20

## 2024-01-14 RX ADMIN — ASPIRIN 81 MG CHEWABLE TABLET 81 MG: 81 TABLET CHEWABLE at 08:20

## 2024-01-14 RX ADMIN — CALCIUM ACETATE 667 MG: 667 CAPSULE ORAL at 12:02

## 2024-01-14 RX ADMIN — LABETALOL HYDROCHLORIDE 800 MG: 200 TABLET, FILM COATED ORAL at 18:04

## 2024-01-14 RX ADMIN — ONDANSETRON 4 MG: 2 INJECTION INTRAMUSCULAR; INTRAVENOUS at 13:51

## 2024-01-14 RX ADMIN — LABETALOL HYDROCHLORIDE 800 MG: 200 TABLET, FILM COATED ORAL at 08:20

## 2024-01-14 RX ADMIN — FAMOTIDINE 40 MG: 20 TABLET, FILM COATED ORAL at 08:21

## 2024-01-14 RX ADMIN — POLYETHYLENE GLYCOL 3350 17 G: 17 POWDER, FOR SOLUTION ORAL at 08:22

## 2024-01-14 RX ADMIN — GABAPENTIN 300 MG: 300 CAPSULE ORAL at 14:25

## 2024-01-14 RX ADMIN — Medication 10 MG: at 04:56

## 2024-01-14 RX ADMIN — HYDRALAZINE HYDROCHLORIDE 20 MG: 20 INJECTION, SOLUTION INTRAMUSCULAR; INTRAVENOUS at 13:30

## 2024-01-14 RX ADMIN — ESCITALOPRAM OXALATE 20 MG: 20 TABLET ORAL at 08:21

## 2024-01-14 RX ADMIN — HYDRALAZINE HYDROCHLORIDE 100 MG: 50 TABLET, FILM COATED ORAL at 21:06

## 2024-01-14 NOTE — OCCUPATIONAL THERAPY NOTE
"  Occupational Therapy Progress Note     Patient Name: Mateus Mckeon  Today's Date: 1/14/2024  Problem List  Principal Problem:    Acute CVA (cerebrovascular accident) (HCC)  Active Problems:    Type 1 diabetes mellitus (HCC)    Hyperphosphatemia    Anemia in chronic kidney disease, on chronic dialysis (HCC)    End stage kidney disease (HCC)    Subarachnoid hemorrhage (HCC)    Primary hypertension         Occupational Therapy Treatment Note       01/14/24 1348   OT Last Visit   OT Visit Date 01/14/24   Note Type   Note Type Treatment   Pain Assessment   Pain Assessment Tool 0-10   Pain Score No Pain   Restrictions/Precautions   Weight Bearing Precautions Per Order No   Other Precautions Cognitive   Lifestyle   Autonomy Pt reports being I with ADLs and receivies assistance with IADLs.Pt uses [facility] for community mobility   Reciprocal Relationships Pt lives with parents- both retired   Service to Others Pt is unemployed   Intrinsic Gratification Pt enjoys relaxing   ADL   Where Assessed Edge of bed   Eating Assistance 5  Supervision/Setup   Eating Deficit Adaptive utensils (Comment)   Eating Comments pt reports he is R handed and using his L to eat/pt offered built up foam handle. Pt reported this was helpful   Bed Mobility   Supine to Sit 5  Supervision   Sit to Supine 5  Supervision   Additional Comments pt sat EOB for participation in R UE thera-ex   Therapeutic Exercise - ROM   UE-ROM Yes   ROM - Left Upper Extremities    L Shoulder Prolonged stretch  (gravity eleminated/use of tray table)   L Elbow Elbow flexion;Elbow extension   L Wrist Wrist flexion;Wrist extension   L Hand Thumb;Index finger;Long finger;Ring finger;Little finger   L Weight/Reps/Sets 10 x 1 each   LUE ROM Comment Pt instructed on self-ROM/Home Exercise program for L UE with assist from R UE.   Subjective   Subjective pt stated \" I was ok before I met everybody\"   Cognition   Overall Cognitive Status WFL   Arousal/Participation Responsive "   Attention Attends with cues to redirect   Orientation Level Oriented X4   Memory Within functional limits   Following Commands Follows multistep commands without difficulty   Activity Tolerance   Activity Tolerance Patient limited by fatigue   Assessment   Assessment Pt seen for Occupational Therapy session with focus on activity tolerance, bed mob,  sitting balance and tolerance for pt engagement in self-feeding and home Exercise program/HEP. Pt cleared by MIKAEL/Poonam for pt participated in OT session. Pt presented sitting edge of bed for eating lunch.  Pt agreeable to participate in therapy following pt identifiers confirmed. Pt noted for flat affect and he reported fatigue. Pt family/mother present throughout session and supportive of pt. Pt did not report his therapy goal this session. Pt able to demonstrate R UE shoulder flexion however decreased elbow, wrist and digit flexion and extension. Pt  R UE was not functional and pt mother reported that he was feeding himself with his L UE.  Pt set up with built up foam for improved self-feeding. He was able to tolerate sitting at edge of pt bed for approx 15 min to participate in R UE there-ex before reporting that he needed to lay down. Pt return to bed post session and all needs within reach.   Plan   Treatment Interventions ADL retraining   Goal Expiration Date 01/23/24   OT Treatment Day 2   OT Frequency 3-5x/wk   Discharge Recommendation   Rehab Resource Intensity Level, OT III (Minimum Resource Intensity)   AM-PAC Daily Activity Inpatient   Lower Body Dressing 3   Bathing 3   Toileting 3   Upper Body Dressing 3   Grooming 3   Eating 3   Daily Activity Raw Score 18   Daily Activity Standardized Score (Calc for Raw Score >=11) 38.66   AM-PAC Applied Cognition Inpatient   Following a Speech/Presentation 3   Understanding Ordinary Conversation 4   Taking Medications 4   Remembering Where Things Are Placed or Put Away 4   Remembering List of 4-5 Errands 4   Taking  Care of Complicated Tasks 3   Applied Cognition Raw Score 22   Applied Cognition Standardized Score 47.83   Barthel Index   Feeding 5   Bathing 5   Grooming Score 5   Dressing Score 5   Bladder Score 10   Bowels Score 10   Toilet Use Score 10   Transfers (Bed/Chair) Score 5   Mobility (Level Surface) Score 0   Stairs Score 0   Barthel Index Score 55   Modified Bertie Scale   Modified Radha Scale 4     Sugey CUNNINGHAM/FIFI

## 2024-01-14 NOTE — ASSESSMENT & PLAN NOTE
Lab Results   Component Value Date    EGFR 6 01/14/2024    EGFR 8 01/13/2024    EGFR 6 01/12/2024    CREATININE 9.56 (H) 01/14/2024    CREATININE 7.07 (H) 01/13/2024    CREATININE 8.79 (H) 01/12/2024     On HD (MWF)

## 2024-01-14 NOTE — ASSESSMENT & PLAN NOTE
Lab Results   Component Value Date    EGFR 6 01/14/2024    EGFR 8 01/13/2024    EGFR 6 01/12/2024    CREATININE 9.56 (H) 01/14/2024    CREATININE 7.07 (H) 01/13/2024    CREATININE 8.79 (H) 01/12/2024

## 2024-01-14 NOTE — ASSESSMENT & PLAN NOTE
"New onset RUE weakness with mild facial droop  MRI brain \"Crescendo multifocal acute/recent infarctions involving at least 3 discrete vascular territories (left posterior cerebral artery and bilateral middle cerebral artery territories), worrisome for multifocal new/interval embolic infarctions\"  Continue stroke pathway  ASA 81 mg daily and atorvastatin 40mg daily.   Telemetry.  No events  Echo 1/5 no PFO  "

## 2024-01-14 NOTE — ASSESSMENT & PLAN NOTE
Lab Results   Component Value Date    HGBA1C 5.9 (H) 01/07/2024       Recent Labs     01/13/24  1146 01/13/24  1812 01/14/24  0012 01/14/24  0827   POCGLU 164* 168* 116 171*       Blood Sugar Average: Last 72 hrs:  (P) [id number]    Continue management per primary team

## 2024-01-14 NOTE — PLAN OF CARE
Problem: OCCUPATIONAL THERAPY ADULT  Goal: Performs self-care activities at highest level of function for planned discharge setting.  See evaluation for individualized goals.  Description: Treatment Interventions: ADL retraining, Functional transfer training, UE strengthening/ROM, Endurance training, Equipment evaluation/education, Compensatory technique education, Continued evaluation, Energy conservation, Activityengagement          See flowsheet documentation for full assessment, interventions and recommendations.   Outcome: Progressing  Note: Limitation: Decreased ADL status, Decreased Safe judgement during ADL, Decreased endurance, Decreased self-care trans, Decreased high-level ADLs  Prognosis: Fair  Assessment: Pt seen for Occupational Therapy session with focus on activity tolerance, bed mob,  sitting balance and tolerance for pt engagement in self-feeding and home Exercise program/HEP. Pt cleared by MIKAEL/Poonam for pt participated in OT session. Pt presented sitting edge of bed for eating lunch.  Pt agreeable to participate in therapy following pt identifiers confirmed. Pt noted for flat affect and he reported fatigue. Pt family/mother present throughout session and supportive of pt. Pt did not report his therapy goal this session. Pt able to demonstrate R UE shoulder flexion however decreased elbow, wrist and digit flexion and extension. Pt  R UE was not functional and pt mother reported that he was feeding himself with his L UE.  Pt set up with built up foam for improved self-feeding. He was able to tolerate sitting at edge of pt bed for approx 15 min to participate in R UE there-ex before reporting that he needed to lay down. Pt return to bed post session and all needs within reach.     Rehab Resource Intensity Level, OT: III (Minimum Resource Intensity)

## 2024-01-14 NOTE — PHYSICAL THERAPY NOTE
Physical Therapy Cancellation Note           01/14/24 1350   PT Last Visit   PT Visit Date 01/14/24   Note Type   Note Type Cancelled Session   Cancel Reasons Refusal       Attempted to see pt for PT treatment this date. Pt refusing at this time stating he is too fatigued. Will attempt to see pt again at a later date.     Annette Calles PT, DPT

## 2024-01-14 NOTE — ASSESSMENT & PLAN NOTE
Neurology following  Started with new onset right hand weakness and right facial droop 1/12-1/13 concerning for new stroke  MRI brain ordered and pending, will be noncontrasted study so should not affect HD  CTA head and neck without any acute findings  Asked therapy to see patient today to work on hand mobility given weakness  Asa has been resumed 1/12

## 2024-01-14 NOTE — PROGRESS NOTES
[facility]  Progress Note  Name: Mateus Mckeon I  MRN: [id number]  Unit/Bed#: [facility] 718-01 I Date of Admission: 1/5/2024   Date of Service: 1/14/2024 I Hospital Day: 9    Assessment/Plan   * Subarachnoid hemorrhage (HCC)  Assessment & Plan  Right basilar cistern SAH of unclear etiology  BD 14, HH 2, MF 3  S/p Cerebral angiography x 2 - both negative (Dr. Mittal, 1/5/2024, 1/12/2024)  Presented on 1/4/2024 with MONTILLA that started on 12/31/23  Hx ASA use for hx multiple strokes, reversed with DDAVP on arrival  MRIs negative for source of SAH; small scattered acute/subacute infarcts noted.   Pt on hemodialysis for CKD.   1/12-1/13 started with new hand weakness and facial droop, concern for possible new stroke    Imaging:  CT head wo 1/9/24: Improving subarachnoid hemorrhage compared to recent prior studies. No new findings.   MRI brain wo 1/8/24:Stable hematoma in the right CP angle, prepontine and premedullary cisterns. New subarachnoid hemorrhage over the convexities and within the lateral ventricles likely due to redistribution.Few tiny scattered recent infarcts in multiple vascular distributions.  MRI cervical spine wo 1/8/24:No identifiable etiology for subarachnoid hemorrhage.Mild congenital canal stenosis.New marrow edema at C5-C6 with severe disc space narrowing that is similar to recent CT. Modic type I endplate degenerative changes favored over infection but recommend clinical correlation and follow-up imaging if warranted.    Plan:  Angio negative x 2 - at this time, can dc SAH pathway.  Will plan for follow up in 6 weeks time with repeat MRI brain w wo contrast and MRA head wo contrast   Continue to monitor neuro exam closely.  GCS 15 with RUE weakness and ataxia, right facial droop corrected with smiling.  STAT CTA with decline in GCS > 2 pts in 1 hour.  Maintain SBP < 160 mmHg.  Pt with hx of strokes. Continue asa 81mg   Mobilize as tolerated.  DVT ppx: SCD's, can  "resume dvt ppx at this time    Neurosurgery will continue to follow. Call with questions.      Right hand weakness  Assessment & Plan  Neurology following  Started with new onset right hand weakness and right facial droop 1/12-1/13 concerning for new stroke  MRI brain ordered and pending, will be noncontrasted study so should not affect HD  CTA head and neck without any acute findings  Asked therapy to see patient today to work on hand mobility given weakness  Asa has been resumed 1/12    End stage kidney disease (HCC)  Assessment & Plan  Lab Results   Component Value Date    EGFR 6 01/14/2024    EGFR 8 01/13/2024    EGFR 6 01/12/2024    CREATININE 9.56 (H) 01/14/2024    CREATININE 7.07 (H) 01/13/2024    CREATININE 8.79 (H) 01/12/2024     On HD (MWF)    Type 1 diabetes mellitus (HCC)  Assessment & Plan  Lab Results   Component Value Date    HGBA1C 5.9 (H) 01/07/2024       Recent Labs     01/13/24  1146 01/13/24  1812 01/14/24  0012 01/14/24  0827   POCGLU 164* 168* 116 171*       Blood Sugar Average: Last 72 hrs:  (P) [id number]    Continue management per primary team         Subjective/Objective   Chief Complaint: \"I am doing horrible\"    Subjective: Patient is in bed with mother at the bedside.  He states he is doing horrible with ongoing weakness in the right upper extremity that he feels is the same as yesterday.  He continues to have a right facial droop.  Both him and his mother are upset about possible new stroke, wondering when therapy will be in to see him and what the next steps will be.    Objective: Sitting up in bed, appears upset secondary to right upper extremity weakness.    I/O         01/12 0701 01/13 0700 01/13 0701 01/14 0700 01/14 0701  01/15 0700    P.O.  100 240    I.V. (mL/kg) 550 (5.8)      Other 200      Total Intake(mL/kg) 750 (7.9) 100 (1.1) 240 (2.5)    Urine (mL/kg/hr)  0 (0)     Other 800      Stool 1      Blood 5      Total Output 806 0     Net -56 +100 +240             " "      Invasive Devices       Peripheral Intravenous Line  Duration             Peripheral IV 01/13/24 Left;Ventral (anterior) Forearm 1 day              Line  Duration             Hemodialysis AV Fistula 11/09/20 Left Other (Comment) 1160 days                    Physical Exam:  Vitals: Blood pressure (!) 180/89, pulse 70, temperature 98.2 °F (36.8 °C), resp. rate 16, height 5' 5\" (1.651 m), weight 95.1 kg (209 lb 10.5 oz), SpO2 97%.,Body mass index is 34.89 kg/m².      General appearance: alert, appears stated age, cooperative and no distress  Head: Normocephalic, without obvious abnormality, atraumatic  Eyes: conjugate gaze  Neck: supple, symmetrical, trachea midline  Lungs: non labored breathing  Heart: regular heart rate  Neurologic:   Mental status: Alert, oriented x3, follows commands, thought content appropriate  Cranial nerves: grossly intact (Cranial nerves II-XII) -right facial drooping at the mouth, mostly corrected with smiling  Motor: moving all extremities with full strength except for RUE which has 0/5 , 4/5 bicep/tricep/deltoid, ataxic in RUE  Coordination:  ataxic RUE      Lab Results:  Results from last 7 days   Lab Units 01/14/24  0604 01/13/24  0548 01/12/24  0623 01/10/24  0557 01/09/24  0436   WBC Thousand/uL 9.53 7.83 7.25 6.49 10.48*   HEMOGLOBIN g/dL 9.7* 9.5* 9.4* 8.2* 9.4*   HEMATOCRIT % 28.6* 28.5* 28.4* 24.9* 27.9*   PLATELETS Thousands/uL 257 273 260 192 220   NEUTROS PCT %  --  69  --  76* 83*   MONOS PCT %  --  7  --  9 6   EOS PCT %  --  6  --  0 0     Results from last 7 days   Lab Units 01/14/24  0604 01/13/24  0548 01/12/24  0623 01/11/24  0522   SODIUM mmol/L 136 138 136 138   POTASSIUM mmol/L 4.5 4.3 4.4 4.1   CHLORIDE mmol/L 97 98 97 99   CO2 mmol/L 23 27 26 28   BUN mg/dL 39* 29* 37* 25   CREATININE mg/dL 9.56* 7.07* 8.79* 6.69*   CALCIUM mg/dL 7.9* 7.7* 8.3* 8.2*   ALK PHOS U/L  --   --   --  64   ALT U/L  --   --   --  9   AST U/L  --   --   --  13     Results from last 7 " "days   Lab Units 01/10/24  0557 01/09/24  0436 01/08/24  0654   MAGNESIUM mg/dL 2.2 2.1 2.3     Results from last 7 days   Lab Units 01/10/24  0557 01/09/24  0436 01/08/24  0654   PHOSPHORUS mg/dL 5.7* 6.2* 6.1*     Results from last 7 days   Lab Units 01/12/24  0623   INR  1.05   PTT seconds 30     No results found for: \"TROPONINT\"  ABG:  Lab Results   Component Value Date    PHART 7.454 (H) 02/21/2020    SNW7ZMK 33.2 (L) 02/21/2020    PO2ART 170.0 (H) 02/21/2020    ZXN0XJV 22.8 02/21/2020    BEART -0.8 02/21/2020    SOURCE Radial, Right 02/21/2020       Imaging Studies: I have personally reviewed pertinent reports.   and I have personally reviewed pertinent films in PACS    CT head wo contrast    Result Date: 1/9/2024  Impression: Improving subarachnoid hemorrhage compared to recent prior studies. No new findings. Other stable and nonemergent findings above. Workstation performed: [id number]     MRI brain w wo contrast    Result Date: 1/8/2024  Impression: Stable hematoma in the right CP angle, prepontine and premedullary cisterns. New subarachnoid hemorrhage over the convexities and within the lateral ventricles likely due to redistribution. Few tiny scattered recent infarcts in multiple vascular distributions. Study marked in epic for notification. Workstation performed: [id number]     MRI cervical spine w wo contrast    Result Date: 1/8/2024  Impression: No identifiable etiology for subarachnoid hemorrhage. Mild congenital canal stenosis. New marrow edema at C5-C6 with severe disc space narrowing that is similar to recent CT. Modic type I endplate degenerative changes favored over infection but recommend clinical correlation and follow-up imaging if warranted. Study marked in [employer] for notification. Workstation performed: [id number]     CTA head and neck with and without contrast    Result Date: 1/5/2024  Impression: 1.  Small amount of subarachnoid hemorrhage in the right basilar cistern region is again seen " without significant change since the prior CT brain exam of the same day. No enhancing brain mass/fluid collection or evidence of vascular malformation. Major intracranial dural venous sinuses are grossly patent. 2.  Mild scattered calcific atherosclerosis of the carotid bifurcation and cavernous carotid segments bilaterally without significant stenosis. Bilateral major cervical/intracranial arteries are patent with normal course and caliber. No arterial occlusion, significant flow-limiting stenosis, or focal saccular aneurysm identified 3.  Nonspecific 4 mm right upper lobe lung nodule is seen. No routine follow-up imaging recommended per current Fleischner Society guidelines for low risk patient. 4.  Opacification of the right mastoid air cells could be secondary to mastoid effusion or mastoiditis, recommend clinical correlation with ENT exam findings. Left mastoid air cells and paranasal sinuses appear well aerated and clear. Workstation performed: [id number]     CT head without contrast    Result Date: 1/5/2024  Impression: 1.  Right prepontine/premedullary cistern subarachnoid hemorrhage. Minimal mass effect on the right middle cerebellar peduncle. Short-term follow-up is recommended. 2.  Opacification of the right mastoid air cells correlate clinically for mastoiditis. I personally discussed this study with REYNALDO MULLER on 1/5/2024 3:43 AM. Workstation performed: [id number]       EKG, Pathology, and Other Studies: I have personally reviewed pertinent reports.      VTE Pharmacologic Prophylaxis: none ordered    VTE Mechanical Prophylaxis: sequential compression device

## 2024-01-14 NOTE — ASSESSMENT & PLAN NOTE
Right basilar cistern SAH of unclear etiology  BD 14, HH 2, MF 3  S/p Cerebral angiography x 2 - both negative (Dr. Mittal, 1/5/2024, 1/12/2024)  Presented on 1/4/2024 with MONTILLA that started on 12/31/23  Hx ASA use for hx multiple strokes, reversed with DDAVP on arrival  MRIs negative for source of SAH; small scattered acute/subacute infarcts noted.   Pt on hemodialysis for CKD.   1/12-1/13 started with new hand weakness and facial droop, concern for possible new stroke    Imaging:  CT head wo 1/9/24: Improving subarachnoid hemorrhage compared to recent prior studies. No new findings.   MRI brain wo 1/8/24:Stable hematoma in the right CP angle, prepontine and premedullary cisterns. New subarachnoid hemorrhage over the convexities and within the lateral ventricles likely due to redistribution.Few tiny scattered recent infarcts in multiple vascular distributions.  MRI cervical spine wo 1/8/24:No identifiable etiology for subarachnoid hemorrhage.Mild congenital canal stenosis.New marrow edema at C5-C6 with severe disc space narrowing that is similar to recent CT. Modic type I endplate degenerative changes favored over infection but recommend clinical correlation and follow-up imaging if warranted.    Plan:  Angio negative x 2 - at this time, can dc SAH pathway.  Will plan for follow up in 6 weeks time with repeat MRI brain w wo contrast and MRA head wo contrast   Continue to monitor neuro exam closely.  GCS 15 with RUE weakness and ataxia, right facial droop corrected with smiling.  STAT CTA with decline in GCS > 2 pts in 1 hour.  Maintain SBP < 160 mmHg.  Pt with hx of strokes. Continue asa 81mg   Mobilize as tolerated.  DVT ppx: SCD's, can resume dvt ppx at this time    Neurosurgery will continue to follow. Call with questions.

## 2024-01-14 NOTE — PROGRESS NOTES
"[facility]  Progress Note  Name: Mateus Mckeon I  MRN: [id number]  Unit/Bed#: [facility]PHP 718-01 I Date of Admission: 1/5/2024   Date of Service: 1/14/2024 I Hospital Day: 9    Assessment/Plan   * Acute CVA (cerebrovascular accident) (HCC)  Assessment & Plan  New onset RUE weakness with mild facial droop  MRI brain \"Crescendo multifocal acute/recent infarctions involving at least 3 discrete vascular territories (left posterior cerebral artery and bilateral middle cerebral artery territories), worrisome for multifocal new/interval embolic infarctions\"  Continue stroke pathway  ASA 81 mg daily and atorvastatin 40mg daily.   Telemetry.  No events  Echo 1/5 no PFO  PT OT reevaluation    Primary hypertension  Assessment & Plan  Monitor BP with clonidine, hydralazine, lisinopril, labetalol, procardia (change 90 mg daily to 60 mg twice daily)  Prn IV labetalol and hydralazine  Appreciate nephrology   Will obtain vas renal artery study    Anemia due to chronic kidney disease, on chronic dialysis ([facility])  Assessment & Plan  Lab Results   Component Value Date    EGFR 6 01/14/2024    EGFR 8 01/13/2024    EGFR 6 01/12/2024    CREATININE 9.56 (H) 01/14/2024    CREATININE 7.07 (H) 01/13/2024    CREATININE 8.79 (H) 01/12/2024       End stage kidney disease ([facility])  Assessment & Plan  Dialysis per nephrology     Anemia in chronic kidney disease, on chronic dialysis ([facility])  Assessment & Plan  Lab Results   Component Value Date    EGFR 6 01/14/2024    EGFR 8 01/13/2024    EGFR 6 01/12/2024    CREATININE 9.56 (H) 01/14/2024    CREATININE 7.07 (H) 01/13/2024    CREATININE 8.79 (H) 01/12/2024   hgb is stable         Type 1 diabetes mellitus.  Continue management with insulin pump.  Monitor blood glucose levels    VTE Pharmacologic Prophylaxis: VTE Score: 7 Moderate Risk (Score 3-4) - Pharmacological DVT Prophylaxis Ordered: heparin.    Mobility:   Basic Mobility Inpatient Raw Score: 18  -[facility] Goal: 6: " Walk 10 steps or more  -HLM Achieved: 1: Laying in bed  HLM Goal NOT achieved. Continue with multidisciplinary rounding and encourage appropriate mobility to improve upon HLM goals.    Patient Centered Rounds: I performed bedside rounds with nursing staff today.   Discussions with Specialists or Other Care Team Provider: Neurology and nephrology    Education and Discussions with Family / Patient: Updated  (father) at bedside.    Total Time Spent on Date of Encounter in care of patient: 50 mins. This time was spent on one or more of the following: performing physical exam; counseling and coordination of care; obtaining or reviewing history; documenting in the medical record; reviewing/ordering tests, medications or procedures; communicating with other healthcare professionals and discussing with patient's family/caregivers.    Current Length of Stay: 9 day(s)  Current Patient Status: Inpatient   Certification Statement: The patient will continue to require additional inpatient hospital stay due to stroke and blood pressure monitor  Discharge Plan: Anticipate discharge in 48-72 hrs to discharge location to be determined pending rehab evaluations.    Code Status: Level 1 - Full Code    Subjective:   Very tired.  Continues to have right hand weakness.  No chest pain or shortness of breath.  Headaches are mild 3 out of 10    Objective:     Vitals:   Temp (24hrs), Av.3 °F (36.8 °C), Min:98 °F (36.7 °C), Max:98.6 °F (37 °C)    Temp:  [98 °F (36.7 °C)-98.6 °F (37 °C)] 98 °F (36.7 °C)  HR:  [66-71] 70  Resp:  [16] 16  BP: (150-190)/(74-93) 150/74  SpO2:  [96 %-99 %] 97 %  Body mass index is 34.89 kg/m².     Input and Output Summary (last 24 hours):     Intake/Output Summary (Last 24 hours) at 2024 1533  Last data filed at 2024 0759  Gross per 24 hour   Intake 340 ml   Output 0 ml   Net 340 ml       Physical Exam:   Physical Exam  Constitutional:       General: He is not in acute distress.      Appearance: He is ill-appearing.   Cardiovascular:      Rate and Rhythm: Normal rate and regular rhythm.      Pulses: Normal pulses.   Pulmonary:      Effort: Pulmonary effort is normal.      Breath sounds: Normal breath sounds.   Abdominal:      General: Bowel sounds are normal.      Palpations: Abdomen is soft.   Neurological:      Mental Status: He is alert.      Motor: Weakness (right hand weakness) present.      Comments: Slight facial droop          Additional Data:     Labs:  Results from last 7 days   Lab Units 01/14/24  0604 01/13/24  0548   WBC Thousand/uL 9.53 7.83   HEMOGLOBIN g/dL 9.7* 9.5*   HEMATOCRIT % 28.6* 28.5*   PLATELETS Thousands/uL 257 273   NEUTROS PCT %  --  69   LYMPHS PCT %  --  16   MONOS PCT %  --  7   EOS PCT %  --  6     Results from last 7 days   Lab Units 01/14/24  0604 01/12/24  0623 01/11/24  0522   SODIUM mmol/L 136   < > 138   POTASSIUM mmol/L 4.5   < > 4.1   CHLORIDE mmol/L 97   < > 99   CO2 mmol/L 23   < > 28   BUN mg/dL 39*   < > 25   CREATININE mg/dL 9.56*   < > 6.69*   ANION GAP mmol/L 16   < > 11   CALCIUM mg/dL 7.9*   < > 8.2*   ALBUMIN g/dL  --   --  3.3*   TOTAL BILIRUBIN mg/dL  --   --  0.53   ALK PHOS U/L  --   --  64   ALT U/L  --   --  9   AST U/L  --   --  13   GLUCOSE RANDOM mg/dL 106   < > 153*    < > = values in this interval not displayed.     Results from last 7 days   Lab Units 01/12/24  0623   INR  1.05     Results from last 7 days   Lab Units 01/14/24  1352 01/14/24  1158 01/14/24  0827 01/14/24  0012 01/13/24  1812 01/13/24  1146 01/13/24  0643 01/13/24  0014 01/12/24  2341 01/12/24  1917 01/12/24  1327 01/12/24  0651   POC GLUCOSE mg/dl 236* 145* 171* 116 168* 164* 147* 116 133 182* 98 187*     Results from last 7 days   Lab Units 01/14/24  0604   HEMOGLOBIN A1C % 6.0*           Lines/Drains:  Invasive Devices       Peripheral Intravenous Line  Duration             Peripheral IV 01/13/24 Left;Ventral (anterior) Forearm 1 day              Line  Duration              Hemodialysis AV Fistula 11/09/20 Left Other (Comment) 1161 days                          Imaging: Reviewed radiology reports from this admission including: MRI brain    Recent Cultures (last 7 days):         Last 24 Hours Medication List:   Current Facility-Administered Medications   Medication Dose Route Frequency Provider Last Rate    aspirin  81 mg Oral Daily Malu Moore PA-C      atorvastatin  40 mg Oral QPM Radha Steen MD      bisacodyl  10 mg Rectal Daily PRN Rahda Steen MD      calcium acetate  667 mg Oral TID With Meals Radha Steen MD      cinacalcet  60 mg Oral Daily Radha Steen MD      cloNIDine  0.3 mg Transdermal Weekly Radha Steen MD      escitalopram  20 mg Oral Daily Radha Steen MD      famotidine  40 mg Oral Daily Radha Steen MD      gabapentin  300 mg Oral Daily Radha Steen MD      hydrALAZINE  20 mg Intravenous Q4H PRN Court Edwards MD      hydrALAZINE  100 mg Oral TID Quinten Henson MD      insulin lispro  300 Units Subcutaneous Insulin Pump Daily PRN Radha Steen MD      labetalol  10 mg Intravenous Q6H PRN Radha Steen MD      labetalol  800 mg Oral TID Radha Steen MD      lisinopril  40 mg Oral Daily Quinten Henson MD      melatonin  6 mg Oral HS PRN Cinthia Dempsey MD      NIFEdipine  60 mg Oral Q12H Mason Herr MD      ondansetron  4 mg Intravenous Q4H PRN Radha Steen MD      oxyCODONE  2.5 mg Oral Q4H PRN Radha Steen MD      Or    oxyCODONE  5 mg Oral Q4H PRN Radha Steen MD      patient maintained insulin pump  1 each Subcutaneous Q8H Radha Steen MD      polyethylene glycol  17 g Oral Daily Radha Steen MD      prochlorperazine  5 mg Oral Q6H PRN Radha Steen MD      senna-docusate sodium  1 tablet Oral BID Radha Steen MD          Today, Patient Was Seen By: Mason Herr MD    **Please Note: This note may have been constructed using a voice recognition system.**

## 2024-01-14 NOTE — PROGRESS NOTES
Progress Note - Neurology   Mateus Mckeon 38 y.o. male MRN: [id number]  Unit/Bed#: [facility] 718-01 Encounter: [id number]      Assessment/Plan   Acute CVA (cerebrovascular accident) (HCC)  Assessment & Plan  38-year-old male with complex medical history:  -July 2015: Cerebellar stroke  -January 2018 pontine stroke  -ESRD on HD  -Type 1 diabetes  -Heterozygous prothrombin gene mutation ([id number])  -MTHFR homozygous C677T mutation  -February 2020: Seizure/status epilepticus concern,    Initially presented on 1/4 with several days of severe headache; initial neuroimaging with SAH and right basilar cistern; subsequent workup including MRI brain revealed multiple tiny R hemisphere recent acute/subacute infarcts; has undergone 2 cerebral angiograms this admission, both of which unrevealing.     Neurology asked to evaluate 1/13 for distal right upper extremity weakness; reportedly began post-angio yesterday 1/12: Concerning for new/interim ischemia (recent R hemisphere infarcts on MRI from 1/8 would not correlate with focal deficits).    MRI brain today 1/14: with new/interim bi-hemisphere areas of infarct in differing vascular territories, new since prior MRI brain on 1/8. With timing of symptoms (onset following angiogram), suspect new strokes may have been related to cerebral angiogram performed on 1/12.     Neuroimaging thus far:  -CT head: Subarachnoid hemorrhage and right prepontine/pre-medullary cistern  -CTA head/neck: SAH right basilar cistern, no vascular malformation, patent dural venous sinuses, no significant flow restrictive disease/LVO.,  -Initial cerebral angio 1/5: normal   -MRI brain: Stable hemorrhage in right CP angle/prepontine/premedullary cisterns; few tiny scattered acute/early subacute infarcts in right corona radiata/centrum semiovale/right thalamus/right occipital lobe/bilateral cerebellum  -MRI cervical spine: No identifiable etiology for SAH.  Marrow edema at C5-6 with severe disc space  "narrowing,  -Repeat cerebral angio on 1/12: normal    -2D echocardiogram (1/5): EF 70%, systolic function vigorous, normal wall motion, no PFO, bilateral atrial dilation (mild-moderate)  -CTA head/neck 1/13: no acute vascular findings; no LVO/critical stenosis, improved scattered SAH  -MRI brain 1/14: as mentioned above, acute muti-focal infarcts, bihemispheric    Plan:  -stroke pathway: see extensive results/workup above  -neurodiagnostics above  -Recent 2D echo results above  -Restarted home aspirin 81 mg daily yesterday, continue for secondary stroke prevention  -continue lipitor 40 mg daily  -Can pursue normotensive goals from neurology standpoint, no need for permissive HTN  -Hemoglobin A1C 5.9  -Lipid panel with LDL of 29  -Frequent neuro checks  -Telemetry monitoring  -Provide stroke education  -Therapy evaluations    * Subarachnoid hemorrhage (HCC)  Assessment & Plan  -extensive workup ongoing via neurosurgery  -see above under \"R hand weakness\" for workup including 2 cerebral angiograms  -status post transcranial dopplers/spasm watch  -restarted baby aspirin on 1/12    Type 1 diabetes mellitus (HCC)  Assessment & Plan  Lab Results   Component Value Date    HGBA1C 5.9 (H) 01/07/2024       Recent Labs     01/12/24  2341 01/13/24  0014 01/13/24  0643 01/13/24  1146   POCGLU 133 116 147* 164*       Blood Sugar Average: Last 72 hrs:  (P) 191.8125    -euglycemic goals, management per primary team      End stage kidney disease (HCC)  Assessment & Plan  Lab Results   Component Value Date    EGFR 8 01/13/2024    EGFR 6 01/12/2024    EGFR 9 01/11/2024    CREATININE 7.07 (H) 01/13/2024    CREATININE 8.79 (H) 01/12/2024    CREATININE 6.69 (H) 01/11/2024     -on HD, nephrology following  -monitoring BMP/CMP  -avoid nephrotoxins      No further inpatient neurologic workup anticipated, would be ok from neurology standpoint for discharge when ok with primary team/neurosurgery/therapies. Discussed plan of care with " "attending neurologist.     Mateus Mckeon will need follow up in in 6 weeks with neurovascular attending. He will not require outpatient neurological testing.    Subjective:   Patient resting in bed, family at bedside. Notes he is not doing well today; R hand/distal UE weakness remains, no significant change compared to exam yesterday, no HA at present nor other new/interim deficits. Family confirms just baby aspirin use at home prior to admission (no plavix).      Vitals: Blood pressure (!) 180/89, pulse 70, temperature 98.2 °F (36.8 °C), resp. rate 16, height 5' 5\" (1.651 m), weight 95.1 kg (209 lb 10.5 oz), SpO2 97%.,Body mass index is 34.89 kg/m².    Physical Exam:  Physical Exam  Constitutional:       Appearance: Normal appearance.   HENT:      Head: Normocephalic and atraumatic.   Eyes:      Extraocular Movements: Extraocular movements intact and EOM normal.      Conjunctiva/sclera: Conjunctivae normal.      Pupils: Pupils are equal, round, and reactive to light.   Cardiovascular:      Rate and Rhythm: Normal rate.   Pulmonary:      Effort: Pulmonary effort is normal.   Abdominal:      General: There is no distension.   Musculoskeletal:      Cervical back: Normal range of motion and neck supple.   Skin:     General: Skin is warm and dry.   Neurological:      Mental Status: He is alert and oriented to person, place, and time.   Psychiatric:         Speech: Speech normal.        Neurologic Exam     Mental Status   Oriented to person, place, and time.   Attention: normal. Concentration: normal.   Speech: speech is normal   Normal comprehension.     Cranial Nerves     CN II   Visual fields full to confrontation.     CN III, IV, VI   Pupils are equal, round, and reactive to light.  Extraocular motions are normal.     CN V   Right facial sensation deficit: none  Left facial sensation deficit: none    CN VII   Right facial weakness: central    CN VIII   CN VIII normal.     CN IX, X   CN IX normal.   CN X normal. "     Motor Exam   Remains with 1,2/5 strength with R /wrist ROM; proximal R UE strength is much better (at least 4, 4+/5)    Full strength otherwise throughout R LE and L UE/LE.      Sensory Exam   Light touch normal.     Gait, Coordination, and Reflexes R hand/wrist fine motor difficulty and ataxia persist.         Lab, Imaging and other studies: CBC:   Results from last 7 days   Lab Units 01/14/24  0604 01/13/24  0548 01/12/24  0623   WBC Thousand/uL 9.53 7.83 7.25   RBC Million/uL 3.00* 3.01* 3.02*   HEMOGLOBIN g/dL 9.7* 9.5* 9.4*   HEMATOCRIT % 28.6* 28.5* 28.4*   MCV fL 95 95 94   PLATELETS Thousands/uL 257 273 260   , BMP/CMP:   Results from last 7 days   Lab Units 01/14/24  0604 01/13/24  0548 01/12/24  0623 01/11/24  0522   SODIUM mmol/L 136 138 136 138   POTASSIUM mmol/L 4.5 4.3 4.4 4.1   CHLORIDE mmol/L 97 98 97 99   CO2 mmol/L 23 27 26 28   BUN mg/dL 39* 29* 37* 25   CREATININE mg/dL 9.56* 7.07* 8.79* 6.69*   CALCIUM mg/dL 7.9* 7.7* 8.3* 8.2*   AST U/L  --   --   --  13   ALT U/L  --   --   --  9   ALK PHOS U/L  --   --   --  64   EGFR ml/min/1.73sq m 6 8 6 9   , Vitamin B12:   , HgBA1C:   Results from last 7 days   Lab Units 01/14/24  0604   HEMOGLOBIN A1C % 6.0*   , TSH:   , Coagulation:   Results from last 7 days   Lab Units 01/12/24 0623   INR  1.05   , Lipid Profile:   Results from last 7 days   Lab Units 01/14/24 0604   HDL mg/dL 33*   LDL CALC mg/dL 29   TRIGLYCERIDES mg/dL 128     VTE Prophylaxis: Sequential compression device (Venodyne)  and Reason for no pharmacologic prophylaxis SAH    Total time spent today 25 minutes. Discussed plan of care with patient/family and primary team: reviewed new MRI brain, concerning for new multi-focal stroke, potentially related to 1/12 angiogram, continue aspirin/statin, aggressive therapies for hopeful motor/coordination recovery of R UE, stroke team follow up.     Please note dictation software was used in the formulation of this note. Please keep that  in mind in light of any grammatical errors.

## 2024-01-14 NOTE — ASSESSMENT & PLAN NOTE
Lab Results   Component Value Date    EGFR 6 01/14/2024    EGFR 8 01/13/2024    EGFR 6 01/12/2024    CREATININE 9.56 (H) 01/14/2024    CREATININE 7.07 (H) 01/13/2024    CREATININE 8.79 (H) 01/12/2024   hgb is stable

## 2024-01-14 NOTE — PLAN OF CARE
Problem: PAIN - ADULT  Goal: Verbalizes/displays adequate comfort level or baseline comfort level  Description: Interventions:  - Encourage patient to monitor pain and request assistance  - Assess pain using appropriate pain scale  - Administer analgesics based on type and severity of pain and evaluate response  - Implement non-pharmacological measures as appropriate and evaluate response  - Consider cultural and social influences on pain and pain management  - Notify physician/advanced practitioner if interventions unsuccessful or patient reports new pain  Outcome: Progressing     Problem: INFECTION - ADULT  Goal: Absence or prevention of progression during hospitalization  Description: INTERVENTIONS:  - Assess and monitor for signs and symptoms of infection  - Monitor lab/diagnostic results  - Monitor all insertion sites, i.e. indwelling lines, tubes, and drains  - Monitor endotracheal if appropriate and nasal secretions for changes in amount and color  - [place] appropriate cooling/warming therapies per order  - Administer medications as ordered  - Instruct and encourage patient and family to use good hand hygiene technique  - Identify and instruct in appropriate isolation precautions for identified infection/condition  Outcome: Progressing     Problem: SAFETY ADULT  Goal: Patient will remain free of falls  Description: INTERVENTIONS:  - Educate patient/family on patient safety including physical limitations  - Instruct patient to call for assistance with activity   - Consult OT/PT to assist with strengthening/mobility   - Keep Call bell within reach  - Keep bed low and locked with side rails adjusted as appropriate  - Keep care items and personal belongings within reach  - Initiate and maintain comfort rounds  - Make Fall Risk Sign visible to staff  - Offer Toileting every 2 Hours, in advance of need  - Initiate/Maintain bed alarm  - Obtain necessary fall risk management equipment  - Apply yellow socks and  bracelet for high fall risk patients  - Consider moving patient to room near nurses station  Outcome: Progressing  Goal: Maintain or return to baseline ADL function  Description: INTERVENTIONS:  -  Assess patient's ability to carry out ADLs; assess patient's baseline for ADL function and identify physical deficits which impact ability to perform ADLs (bathing, care of mouth/teeth, toileting, grooming, dressing, etc.)  - Assess/evaluate cause of self-care deficits   - Assess range of motion  - Assess patient's mobility; develop plan if impaired  - Assess patient's need for assistive devices and provide as appropriate  - Encourage maximum independence but intervene and supervise when necessary  - Involve family in performance of ADLs  - Assess for home care needs following discharge   - Consider OT consult to assist with ADL evaluation and planning for discharge  - Provide patient education as appropriate  Outcome: Progressing  Goal: Maintains/Returns to pre admission functional level  Description: INTERVENTIONS:  - Perform AM-PAC 6 Click Basic Mobility/ Daily Activity assessment daily.  - Set and communicate daily mobility goal to care team and patient/family/caregiver.   - Collaborate with rehabilitation services on mobility goals if consulted  - Perform Range of Motion 3 times a day.  - Reposition patient every 2 hours.  - Dangle patient 3 times a day  - Stand patient 3 times a day  - Ambulate patient 3 times a day  - Out of bed to chair 3 times a day   - Out of bed for meals 3 times a day  - Out of bed for toileting  - Record patient progress and toleration of activity level   Outcome: Progressing     Problem: DISCHARGE PLANNING  Goal: Discharge to home or other facility with appropriate resources  Description: INTERVENTIONS:  - Identify barriers to discharge w/patient and caregiver  - Arrange for needed discharge resources and transportation as appropriate  - Identify discharge learning needs (meds, wound care,  etc.)  - Arrange for interpretive services to assist at discharge as needed  - Refer to Case Management Department for coordinating discharge planning if the patient needs post-hospital services based on physician/advanced practitioner order or complex needs related to functional status, cognitive ability, or social support system  Outcome: Progressing     Problem: Knowledge Deficit  Goal: Patient/family/caregiver demonstrates understanding of disease process, treatment plan, medications, and discharge instructions  Description: Complete learning assessment and assess knowledge base.  Interventions:  - Provide teaching at level of understanding  - Provide teaching via preferred learning methods  Outcome: Progressing     Problem: Nutrition/Hydration-ADULT  Goal: Nutrient/Hydration intake appropriate for improving, restoring or maintaining nutritional needs  Description: Monitor and assess patient's nutrition/hydration status for malnutrition. Collaborate with interdisciplinary team and initiate plan and interventions as ordered.  Monitor patient's weight and dietary intake as ordered or per policy. Utilize nutrition screening tool and intervene as necessary. Determine patient's food preferences and provide high-protein, high-caloric foods as appropriate.     INTERVENTIONS:  - Monitor oral intake, urinary output, labs, and treatment plans  - Assess nutrition and hydration status and recommend course of action  - Evaluate amount of meals eaten  - Assist patient with eating if necessary   - Allow adequate time for meals  - Recommend/ encourage appropriate diets, oral nutritional supplements, and vitamin/mineral supplements  - Order, calculate, and assess calorie counts as needed  - Recommend, monitor, and adjust tube feedings and TPN/PPN based on assessed needs  - Assess need for intravenous fluids  - Provide specific nutrition/hydration education as appropriate  - Include patient/family/caregiver in decisions related  to nutrition  Outcome: Progressing     Problem: METABOLIC, FLUID AND ELECTROLYTES - ADULT  Goal: Electrolytes maintained within normal limits  Description: INTERVENTIONS:  - Monitor labs and assess patient for signs and symptoms of electrolyte imbalances  - Administer electrolyte replacement as ordered  - Monitor response to electrolyte replacements, including repeat lab results as appropriate  - Instruct patient on fluid and nutrition as appropriate  Outcome: Progressing  Goal: Fluid balance maintained  Description: INTERVENTIONS:  - Monitor labs   - Monitor I/O and WT  - Instruct patient on fluid and nutrition as appropriate  - Assess for signs & symptoms of volume excess or deficit  Outcome: Progressing     Problem: Prexisting or High Potential for Compromised Skin Integrity  Goal: Skin integrity is maintained or improved  Description: INTERVENTIONS:  - Identify patients at risk for skin breakdown  - Assess and monitor skin integrity  - Assess and monitor nutrition and hydration status  - Monitor labs   - Assess for incontinence   - Turn and reposition patient  - Assist with mobility/ambulation  - Relieve pressure over bony prominences  - Avoid friction and shearing  - Provide appropriate hygiene as needed including keeping skin clean and dry  - Evaluate need for skin moisturizer/barrier cream  - Collaborate with interdisciplinary team   - Patient/family teaching  - Consider wound care consult   Outcome: Progressing

## 2024-01-14 NOTE — PLAN OF CARE
Problem: PAIN - ADULT  Goal: Verbalizes/displays adequate comfort level or baseline comfort level  Description: Interventions:  - Encourage patient to monitor pain and request assistance  - Assess pain using appropriate pain scale  - Administer analgesics based on type and severity of pain and evaluate response  - Implement non-pharmacological measures as appropriate and evaluate response  - Consider cultural and social influences on pain and pain management  - Notify physician/advanced practitioner if interventions unsuccessful or patient reports new pain  1/14/2024 1121 by Poonam Kwan RN  Outcome: Progressing  1/14/2024 1120 by Poonam Kwan RN  Outcome: Progressing     Problem: INFECTION - ADULT  Goal: Absence or prevention of progression during hospitalization  Description: INTERVENTIONS:  - Assess and monitor for signs and symptoms of infection  - Monitor lab/diagnostic results  - Monitor all insertion sites, i.e. indwelling lines, tubes, and drains  - Monitor endotracheal if appropriate and nasal secretions for changes in amount and color  - [place] appropriate cooling/warming therapies per order  - Administer medications as ordered  - Instruct and encourage patient and family to use good hand hygiene technique  - Identify and instruct in appropriate isolation precautions for identified infection/condition  1/14/2024 1121 by Poonam Kwan RN  Outcome: Progressing  1/14/2024 1120 by Poonam Kwan RN  Outcome: Progressing     Problem: SAFETY ADULT  Goal: Patient will remain free of falls  Description: INTERVENTIONS:  - Educate patient/family on patient safety including physical limitations  - Instruct patient to call for assistance with activity   - Consult OT/PT to assist with strengthening/mobility   - Keep Call bell within reach  - Keep bed low and locked with side rails adjusted as appropriate  - Keep care items and personal belongings within reach  - Initiate and maintain comfort  rounds  - Make Fall Risk Sign visible to staff  - Apply yellow socks and bracelet for high fall risk patients  - Consider moving patient to room near nurses station  1/14/2024 1121 by Poonam Kwan RN  Outcome: Progressing  1/14/2024 1120 by Poonam Kwan RN  Outcome: Progressing  Goal: Maintain or return to baseline ADL function  Description: INTERVENTIONS:  -  Assess patient's ability to carry out ADLs; assess patient's baseline for ADL function and identify physical deficits which impact ability to perform ADLs (bathing, care of mouth/teeth, toileting, grooming, dressing, etc.)  - Assess/evaluate cause of self-care deficits   - Assess range of motion  - Assess patient's mobility; develop plan if impaired  - Assess patient's need for assistive devices and provide as appropriate  - Encourage maximum independence but intervene and supervise when necessary  - Involve family in performance of ADLs  - Assess for home care needs following discharge   - Consider OT consult to assist with ADL evaluation and planning for discharge  - Provide patient education as appropriate  1/14/2024 1121 by Poonam Kwan RN  Outcome: Progressing  1/14/2024 1120 by Poonam Kwan RN  Outcome: Progressing  Goal: Maintains/Returns to pre admission functional level  Description: INTERVENTIONS:  - Perform AM-PAC 6 Click Basic Mobility/ Daily Activity assessment daily.  - Set and communicate daily mobility goal to care team and patient/family/caregiver.   - Collaborate with rehabilitation services on mobility goals if consulted  - Out of bed for toileting  - Record patient progress and toleration of activity level   1/14/2024 1121 by Poonam Kwan RN  Outcome: Progressing  1/14/2024 1120 by Poonam Kwan RN  Outcome: Progressing     Problem: DISCHARGE PLANNING  Goal: Discharge to home or other facility with appropriate resources  Description: INTERVENTIONS:  - Identify barriers to discharge w/patient and caregiver  -  Arrange for needed discharge resources and transportation as appropriate  - Identify discharge learning needs (meds, wound care, etc.)  - Arrange for interpretive services to assist at discharge as needed  - Refer to Case Management Department for coordinating discharge planning if the patient needs post-hospital services based on physician/advanced practitioner order or complex needs related to functional status, cognitive ability, or social support system  1/14/2024 1121 by Poonam Kwan RN  Outcome: Progressing  1/14/2024 1120 by Poonam Kwan RN  Outcome: Progressing     Problem: Knowledge Deficit  Goal: Patient/family/caregiver demonstrates understanding of disease process, treatment plan, medications, and discharge instructions  Description: Complete learning assessment and assess knowledge base.  Interventions:  - Provide teaching at level of understanding  - Provide teaching via preferred learning methods  1/14/2024 1121 by Poonam Kwan RN  Outcome: Progressing  1/14/2024 1120 by Poonam Kwan RN  Outcome: Progressing     Problem: Nutrition/Hydration-ADULT  Goal: Nutrient/Hydration intake appropriate for improving, restoring or maintaining nutritional needs  Description: Monitor and assess patient's nutrition/hydration status for malnutrition. Collaborate with interdisciplinary team and initiate plan and interventions as ordered.  Monitor patient's weight and dietary intake as ordered or per policy. Utilize nutrition screening tool and intervene as necessary. Determine patient's food preferences and provide high-protein, high-caloric foods as appropriate.     INTERVENTIONS:  - Monitor oral intake, urinary output, labs, and treatment plans  - Assess nutrition and hydration status and recommend course of action  - Evaluate amount of meals eaten  - Assist patient with eating if necessary   - Allow adequate time for meals  - Recommend/ encourage appropriate diets, oral nutritional supplements,  and vitamin/mineral supplements  - Order, calculate, and assess calorie counts as needed  - Recommend, monitor, and adjust tube feedings and TPN/PPN based on assessed needs  - Assess need for intravenous fluids  - Provide specific nutrition/hydration education as appropriate  - Include patient/family/caregiver in decisions related to nutrition  1/14/2024 1121 by Poonam Kwan RN  Outcome: Progressing  1/14/2024 1120 by Poonam Kwan RN  Outcome: Progressing     Problem: METABOLIC, FLUID AND ELECTROLYTES - ADULT  Goal: Electrolytes maintained within normal limits  Description: INTERVENTIONS:  - Monitor labs and assess patient for signs and symptoms of electrolyte imbalances  - Administer electrolyte replacement as ordered  - Monitor response to electrolyte replacements, including repeat lab results as appropriate  - Instruct patient on fluid and nutrition as appropriate  1/14/2024 1121 by Poonam Kwan RN  Outcome: Progressing  1/14/2024 1120 by Poonam Kwan RN  Outcome: Progressing  Goal: Fluid balance maintained  Description: INTERVENTIONS:  - Monitor labs   - Monitor I/O and WT  - Instruct patient on fluid and nutrition as appropriate  - Assess for signs & symptoms of volume excess or deficit  1/14/2024 1121 by Poonam Kwan RN  Outcome: Progressing  1/14/2024 1120 by Poonam Kwan RN  Outcome: Progressing     Problem: Prexisting or High Potential for Compromised Skin Integrity  Goal: Skin integrity is maintained or improved  Description: INTERVENTIONS:  - Identify patients at risk for skin breakdown  - Assess and monitor skin integrity  - Assess and monitor nutrition and hydration status  - Monitor labs   - Assess for incontinence   - Turn and reposition patient  - Assist with mobility/ambulation  - Relieve pressure over bony prominences  - Avoid friction and shearing  - Provide appropriate hygiene as needed including keeping skin clean and dry  - Evaluate need for skin  moisturizer/barrier cream  - Collaborate with interdisciplinary team   - Patient/family teaching  - Consider wound care consult   1/14/2024 1121 by Poonam Kwan, RN  Outcome: Progressing  1/14/2024 1120 by Poonam Kwan, RN  Outcome: Progressing

## 2024-01-14 NOTE — ASSESSMENT & PLAN NOTE
Monitor BP with clonidine, hydralazine, lisinopril, labetalol, procardia (change 90 mg daily to 60 mg twice daily)  Prn IV labetalol and hydralazine  Appreciate nephrology

## 2024-01-15 ENCOUNTER — APPOINTMENT (INPATIENT)
Dept: DIALYSIS | Facility: HOSPITAL | Age: 39
DRG: 064 | End: 2024-01-15
Payer: MEDICARE

## 2024-01-15 ENCOUNTER — TELEPHONE (OUTPATIENT)
Dept: NEUROSURGERY | Facility: CLINIC | Age: 39
End: 2024-01-15

## 2024-01-15 PROBLEM — I16.1 HYPERTENSIVE EMERGENCY: Status: ACTIVE | Noted: 2024-01-11

## 2024-01-15 LAB
ANION GAP SERPL CALCULATED.3IONS-SCNC: 13 MMOL/L
BASOPHILS # BLD AUTO: 0.06 THOUSANDS/ÂΜL (ref 0–0.1)
BASOPHILS NFR BLD AUTO: 1 % (ref 0–1)
BUN SERPL-MCNC: 52 MG/DL (ref 5–25)
CALCIUM SERPL-MCNC: 7.9 MG/DL (ref 8.4–10.2)
CHLORIDE SERPL-SCNC: 94 MMOL/L (ref 96–108)
CO2 SERPL-SCNC: 25 MMOL/L (ref 21–32)
CREAT SERPL-MCNC: 11.46 MG/DL (ref 0.6–1.3)
EOSINOPHIL # BLD AUTO: 0.56 THOUSAND/ÂΜL (ref 0–0.61)
EOSINOPHIL NFR BLD AUTO: 7 % (ref 0–6)
ERYTHROCYTE [DISTWIDTH] IN BLOOD BY AUTOMATED COUNT: 14.6 % (ref 11.6–15.1)
GFR SERPL CREATININE-BSD FRML MDRD: 4 ML/MIN/1.73SQ M
GLUCOSE SERPL-MCNC: 126 MG/DL (ref 65–140)
GLUCOSE SERPL-MCNC: 128 MG/DL (ref 65–140)
GLUCOSE SERPL-MCNC: 133 MG/DL (ref 65–140)
GLUCOSE SERPL-MCNC: 181 MG/DL (ref 65–140)
GLUCOSE SERPL-MCNC: 187 MG/DL (ref 65–140)
GLUCOSE SERPL-MCNC: 189 MG/DL (ref 65–140)
HCT VFR BLD AUTO: 27 % (ref 36.5–49.3)
HGB BLD-MCNC: 8.9 G/DL (ref 12–17)
IMM GRANULOCYTES # BLD AUTO: 0.08 THOUSAND/UL (ref 0–0.2)
IMM GRANULOCYTES NFR BLD AUTO: 1 % (ref 0–2)
LYMPHOCYTES # BLD AUTO: 1.35 THOUSANDS/ÂΜL (ref 0.6–4.47)
LYMPHOCYTES NFR BLD AUTO: 16 % (ref 14–44)
MCH RBC QN AUTO: 31 PG (ref 26.8–34.3)
MCHC RBC AUTO-ENTMCNC: 33 G/DL (ref 31.4–37.4)
MCV RBC AUTO: 94 FL (ref 82–98)
MONOCYTES # BLD AUTO: 0.66 THOUSAND/ÂΜL (ref 0.17–1.22)
MONOCYTES NFR BLD AUTO: 8 % (ref 4–12)
NEUTROPHILS # BLD AUTO: 5.95 THOUSANDS/ÂΜL (ref 1.85–7.62)
NEUTS SEG NFR BLD AUTO: 67 % (ref 43–75)
NRBC BLD AUTO-RTO: 0 /100 WBCS
PLATELET # BLD AUTO: 254 THOUSANDS/UL (ref 149–390)
PMV BLD AUTO: 10.6 FL (ref 8.9–12.7)
POTASSIUM SERPL-SCNC: 5.1 MMOL/L (ref 3.5–5.3)
RBC # BLD AUTO: 2.87 MILLION/UL (ref 3.88–5.62)
SODIUM SERPL-SCNC: 132 MMOL/L (ref 135–147)
VIT B1 BLD-SCNC: NORMAL NMOL/L
WBC # BLD AUTO: 8.66 THOUSAND/UL (ref 4.31–10.16)

## 2024-01-15 PROCEDURE — 82948 REAGENT STRIP/BLOOD GLUCOSE: CPT

## 2024-01-15 PROCEDURE — 85025 COMPLETE CBC W/AUTO DIFF WBC: CPT | Performed by: INTERNAL MEDICINE

## 2024-01-15 PROCEDURE — 99223 1ST HOSP IP/OBS HIGH 75: CPT | Performed by: PHYSICAL MEDICINE & REHABILITATION

## 2024-01-15 PROCEDURE — 80048 BASIC METABOLIC PNL TOTAL CA: CPT | Performed by: INTERNAL MEDICINE

## 2024-01-15 PROCEDURE — 90935 HEMODIALYSIS ONE EVALUATION: CPT | Performed by: STUDENT IN AN ORGANIZED HEALTH CARE EDUCATION/TRAINING PROGRAM

## 2024-01-15 PROCEDURE — 99232 SBSQ HOSP IP/OBS MODERATE 35: CPT | Performed by: NURSE PRACTITIONER

## 2024-01-15 PROCEDURE — 99233 SBSQ HOSP IP/OBS HIGH 50: CPT | Performed by: INTERNAL MEDICINE

## 2024-01-15 RX ORDER — NIFEDIPINE 30 MG/1
90 TABLET, EXTENDED RELEASE ORAL
Status: DISCONTINUED | OUTPATIENT
Start: 2024-01-15 | End: 2024-01-16

## 2024-01-15 RX ORDER — LABETALOL HYDROCHLORIDE 5 MG/ML
20 INJECTION, SOLUTION INTRAVENOUS EVERY 4 HOURS PRN
Status: DISCONTINUED | OUTPATIENT
Start: 2024-01-15 | End: 2024-01-23 | Stop reason: HOSPADM

## 2024-01-15 RX ORDER — CLONIDINE HYDROCHLORIDE 0.2 MG/1
0.2 TABLET ORAL EVERY 8 HOURS SCHEDULED
Status: DISCONTINUED | OUTPATIENT
Start: 2024-01-15 | End: 2024-01-15

## 2024-01-15 RX ORDER — METOPROLOL TARTRATE 1 MG/ML
5 INJECTION, SOLUTION INTRAVENOUS ONCE
Status: COMPLETED | OUTPATIENT
Start: 2024-01-15 | End: 2024-01-15

## 2024-01-15 RX ORDER — NIFEDIPINE 30 MG/1
60 TABLET, EXTENDED RELEASE ORAL EVERY MORNING
Status: DISCONTINUED | OUTPATIENT
Start: 2024-01-16 | End: 2024-01-16

## 2024-01-15 RX ORDER — CAPTOPRIL 25 MG/1
25 TABLET ORAL ONCE
Status: COMPLETED | OUTPATIENT
Start: 2024-01-15 | End: 2024-01-15

## 2024-01-15 RX ORDER — LABETALOL HYDROCHLORIDE 5 MG/ML
10 INJECTION, SOLUTION INTRAVENOUS EVERY 4 HOURS PRN
Status: DISCONTINUED | OUTPATIENT
Start: 2024-01-15 | End: 2024-01-15

## 2024-01-15 RX ADMIN — ASPIRIN 81 MG CHEWABLE TABLET 81 MG: 81 TABLET CHEWABLE at 07:55

## 2024-01-15 RX ADMIN — Medication 10 MG: at 12:07

## 2024-01-15 RX ADMIN — ESCITALOPRAM OXALATE 20 MG: 20 TABLET ORAL at 07:55

## 2024-01-15 RX ADMIN — CALCIUM ACETATE 667 MG: 667 CAPSULE ORAL at 07:55

## 2024-01-15 RX ADMIN — NIFEDIPINE 60 MG: 30 TABLET, FILM COATED, EXTENDED RELEASE ORAL at 13:20

## 2024-01-15 RX ADMIN — METOROPROLOL TARTRATE 5 MG: 5 INJECTION, SOLUTION INTRAVENOUS at 19:27

## 2024-01-15 RX ADMIN — Medication 10 MG: at 17:36

## 2024-01-15 RX ADMIN — LABETALOL HYDROCHLORIDE 800 MG: 200 TABLET, FILM COATED ORAL at 16:07

## 2024-01-15 RX ADMIN — FAMOTIDINE 40 MG: 20 TABLET, FILM COATED ORAL at 07:55

## 2024-01-15 RX ADMIN — SENNOSIDES, DOCUSATE SODIUM 1 TABLET: 8.6; 5 TABLET ORAL at 07:55

## 2024-01-15 RX ADMIN — CALCIUM ACETATE 667 MG: 667 CAPSULE ORAL at 13:23

## 2024-01-15 RX ADMIN — HYDRALAZINE HYDROCHLORIDE 100 MG: 50 TABLET, FILM COATED ORAL at 20:20

## 2024-01-15 RX ADMIN — CINACALCET 60 MG: 30 TABLET ORAL at 07:55

## 2024-01-15 RX ADMIN — ATORVASTATIN CALCIUM 40 MG: 40 TABLET, FILM COATED ORAL at 18:46

## 2024-01-15 RX ADMIN — CLONIDINE HYDROCHLORIDE 0.2 MG: 0.2 TABLET ORAL at 16:08

## 2024-01-15 RX ADMIN — NIFEDIPINE 90 MG: 30 TABLET, FILM COATED, EXTENDED RELEASE ORAL at 20:21

## 2024-01-15 RX ADMIN — Medication 20 MG: at 22:03

## 2024-01-15 RX ADMIN — HYDRALAZINE HYDROCHLORIDE 20 MG: 20 INJECTION, SOLUTION INTRAMUSCULAR; INTRAVENOUS at 23:06

## 2024-01-15 RX ADMIN — OXYCODONE HYDROCHLORIDE 5 MG: 5 TABLET ORAL at 13:20

## 2024-01-15 RX ADMIN — CALCIUM ACETATE 667 MG: 667 CAPSULE ORAL at 16:07

## 2024-01-15 RX ADMIN — GABAPENTIN 300 MG: 300 CAPSULE ORAL at 13:20

## 2024-01-15 RX ADMIN — LISINOPRIL 40 MG: 20 TABLET ORAL at 13:21

## 2024-01-15 RX ADMIN — HYDRALAZINE HYDROCHLORIDE 100 MG: 50 TABLET, FILM COATED ORAL at 16:07

## 2024-01-15 RX ADMIN — CLONIDINE HYDROCHLORIDE 0.3 MG: 0.2 TABLET ORAL at 22:00

## 2024-01-15 RX ADMIN — LABETALOL HYDROCHLORIDE 800 MG: 200 TABLET, FILM COATED ORAL at 20:21

## 2024-01-15 RX ADMIN — HYDRALAZINE HYDROCHLORIDE 20 MG: 20 INJECTION, SOLUTION INTRAMUSCULAR; INTRAVENOUS at 14:14

## 2024-01-15 RX ADMIN — CAPTOPRIL 25 MG: 25 TABLET ORAL at 23:28

## 2024-01-15 RX ADMIN — HYDRALAZINE HYDROCHLORIDE 20 MG: 20 INJECTION, SOLUTION INTRAMUSCULAR; INTRAVENOUS at 18:49

## 2024-01-15 RX ADMIN — SENNOSIDES, DOCUSATE SODIUM 1 TABLET: 8.6; 5 TABLET ORAL at 18:46

## 2024-01-15 NOTE — ASSESSMENT & PLAN NOTE
Initially on Cardene drip while in ICU.   Currently on weekly clonidine patch , hydralazine 100mg TID, lisinopril 40mg qd, labetalol 800 mg TID, procardia (increased to 60mg am/60mg pm)  Prn IV labetalol and hydralazine - has been receiving multiple doses  Further volume removal by HD  D/u with ICU attending Dr Dinesh Dempsey to assess patient for Cardene drip who deferred further management to nephrology team as patient will need further adjustments of his oral antihypertensives to be fit for discharge than to be back again on Cardene drip.   Discussed with nephrology. Appreciate input. increase his Procardia to 60mg AM/90mg PM and change clonidine patch to oral clonidine.

## 2024-01-15 NOTE — PHYSICAL THERAPY NOTE
PHYSICAL THERAPY CANCEL NOTE          Patient Name: Mateus Mckeon  Today's Date: 1/15/2024       01/15/24 1455   PT Last Visit   PT Visit Date 01/15/24   Note Type   Note type Cancelled Session;Re-Evaluation     Attempted to see pt multiple times today however pt off floor for HD and upon return multiple providers with patient. Upon last attempt, BP elevated and RN requesting to hold. Mother updated on therapy frequency last week as well as attempting to see pt multiple times today. +new CVA and will re-evaluate tmrw as able. Thank you.    Elliot Mclaughlin, PT, DPT

## 2024-01-15 NOTE — ASSESSMENT & PLAN NOTE
Nitro Drip started on 1/16/2023  Currently on weekly clonidine patch , hydralazine 100mg TID, lisinopril 40mg qd, labetalol 800 mg TID, procardia (increased to 60mg am/90mg pm)  Prn IV labetalol and hydralazine - has been receiving multiple doses  Further volume removal by HD  Discussed with nephrology. Appreciate input.

## 2024-01-15 NOTE — SPEECH THERAPY NOTE
Speech/Language Pathology Progress Note    Patient Name: Mateus Mckeon  Today's Date: 1/15/2024     Consult received and chart reviewed. Per chart review and discussion with RN, pt passed RN dysphagia assessment and is tolerating regular diet with thin liquids with no s/s of aspiration. Speech/Language deficits also denied. Formal speech/swallow evaluation does not appear to be indicated at this time. If new concerns arise, please reconsult. Thank you.

## 2024-01-15 NOTE — PLAN OF CARE
Problem: SAFETY ADULT  Goal: Patient will remain free of falls  Description: INTERVENTIONS:  - Educate patient/family on patient safety including physical limitations  - Instruct patient to call for assistance with activity   - Consult OT/PT to assist with strengthening/mobility   - Keep Call bell within reach  - Keep bed low and locked with side rails adjusted as appropriate  - Keep care items and personal belongings within reach  - Initiate and maintain comfort rounds  - Make Fall Risk Sign visible to staff  - Offer Toileting every 2 Hours, in advance of need  - Initiate/Maintain bed alarm  - Obtain necessary fall risk management equipment: assistive devices  - Apply yellow socks and bracelet for high fall risk patients  - Consider moving patient to room near nurses station  Outcome: Progressing     Problem: PAIN - ADULT  Goal: Verbalizes/displays adequate comfort level or baseline comfort level  Description: Interventions:  - Encourage patient to monitor pain and request assistance  - Assess pain using appropriate pain scale  - Administer analgesics based on type and severity of pain and evaluate response  - Implement non-pharmacological measures as appropriate and evaluate response  - Consider cultural and social influences on pain and pain management  - Notify physician/advanced practitioner if interventions unsuccessful or patient reports new pain  Outcome: Progressing     Problem: Prexisting or High Potential for Compromised Skin Integrity  Goal: Skin integrity is maintained or improved  Description: INTERVENTIONS:  - Identify patients at risk for skin breakdown  - Assess and monitor skin integrity  - Assess and monitor nutrition and hydration status  - Monitor labs   - Assess for incontinence   - Turn and reposition patient  - Assist with mobility/ambulation  - Relieve pressure over bony prominences  - Avoid friction and shearing  - Provide appropriate hygiene as needed including keeping skin clean and  dry  - Evaluate need for skin moisturizer/barrier cream  - Collaborate with interdisciplinary team   - Patient/family teaching  - Consider wound care consult   Outcome: Progressing     Problem: INFECTION - ADULT  Goal: Absence or prevention of progression during hospitalization  Description: INTERVENTIONS:  - Assess and monitor for signs and symptoms of infection  - Monitor lab/diagnostic results  - Monitor all insertion sites, i.e. indwelling lines, tubes, and drains  - Monitor endotracheal if appropriate and nasal secretions for changes in amount and color  - [place] appropriate cooling/warming therapies per order  - Administer medications as ordered  - Instruct and encourage patient and family to use good hand hygiene technique  - Identify and instruct in appropriate isolation precautions for identified infection/condition  Outcome: Progressing

## 2024-01-15 NOTE — PROGRESS NOTES
NEPHROLOGY PROGRESS NOTE   Mateus Mckeon 38 y.o. male MRN: [id number]  Unit/Bed#: [facility] 718-01 Encounter: [id number]  Reason for Consult: Management of ESRD    ASSESSMENT AND PLAN:  39 yo man with PMH of ESRD on HD at [facility] on, MWF p/w headaches.  Nephrology is consulted for management of ESRD    PLAN:    #ESRD on HD MWF:  Dialysis unit/days: [facility]  Access: AV fistula  on HD, well-tolerated  Renal Diet  Fluid restriction 1-1.5L/d  Adjust medications to GFR<10  Avoid opioids     #Volume status/hypertension:  Volume: Hypervolemic  Blood pressure: Hypertensive, /98  UF on HD  Clonidine patch  Hydralazine 100 3 times daily  Labetalol 3 times daily  Lisinopril 40 mg  Nifedipine 60 twice daily  Low-sodium diet    #Secondary Hyperparasitoidism   Seen at onset 60 mg  PhosLo with meals    # Anemia of Kidney Disease  Current hemoglobin: 8.9 mg/dL  Treatment:  Transfuse for hemoglobin less than 7.0 per primary service        # Subarachnoid hemorrhage  Management as per neurosurgery  Stable      #DM  HbA1c 6  Advised to maintain a good DM control to prevent progression of CKD   Maintain healthy diet (vegetables, fruits, whole grains, nonfat or low fat)  Weight loss  Physical activity (5 to 10 minutes to start the increase to 30 min a day)    HEMODIALYSIS PROCEDURE NOTE  The patient was seen and examined on hemodialysis. The patient is tolerating the procedure well.  Time: 3.5 hours  Sodium: 135 Blood flow: 400   Dialyzer: F160 Potassium: 3 Dialysate flow: 600   Access: AVf Bicarbonate: 35 Ultrafiltration goal: 1.5L   Medications on HD: none         SUBJECTIVE:  Patient seen and examined at bedside this is, well-tolerated.  No shortness of breath, no chest pain      OBJECTIVE:  Current Weight: Weight - Scale: 95.1 kg (209 lb 10.5 oz)  Vitals:    01/15/24 1000   BP: (!) 177/97   Pulse: 67   Resp: 18   Temp:    SpO2:        Intake/Output Summary (Last 24 hours) at 1/15/2024 1010  Last data filed at 1/15/2024  0856  Gross per 24 hour   Intake 640 ml   Output --   Net 640 ml     Wt Readings from Last 3 Encounters:   01/15/24 95.1 kg (209 lb 10.5 oz)   01/13/24 94.8 kg (209 lb)   01/04/24 95.3 kg (210 lb 1.6 oz)     Temp Readings from Last 3 Encounters:   01/15/24 97.8 °F (36.6 °C) (Oral)   01/04/24 97.8 °F (36.6 °C) (Oral)   01/03/24 97.8 °F (36.6 °C) (Oral)     BP Readings from Last 3 Encounters:   01/15/24 (!) 177/97   01/05/24 (!) 194/97   01/03/24 143/69     Pulse Readings from Last 3 Encounters:   01/15/24 67   01/05/24 80   01/03/24 72        General:  no acute distress at this time  Skin:  No acute rash  Eyes:  No scleral icterus and noninjected  ENT:  mucous membranes moist  Neck:  no carotid bruits  Chest:  Clear to auscultation percussion, good respiratory effort, no use of accessory respiratory muscles  CVS:  Regular rate and rhythm without rub   Abdomen:  soft and nontender   Extremities: no significant lower extremity edema  Neuro:  No gross focality  Psych:  Alert , cooperative   dialysis access: AV fistula        Medications:    Current Facility-Administered Medications:     aspirin chewable tablet 81 mg, 81 mg, Oral, Daily, Malu Moore PA-C, 81 mg at 01/15/24 0755    atorvastatin (LIPITOR) tablet 40 mg, 40 mg, Oral, QPM, Radha Steen MD, 40 mg at 01/14/24 1804    bisacodyl (DULCOLAX) rectal suppository 10 mg, 10 mg, Rectal, Daily PRN, Radha Steen MD    calcium acetate (PHOSLO) capsule 667 mg, 667 mg, Oral, TID With Meals, Radha Steen MD, 667 mg at 01/15/24 0755    cinacalcet (SENSIPAR) tablet 60 mg, 60 mg, Oral, Daily, Radha Steen MD, 60 mg at 01/15/24 0755    cloNIDine (CATAPRES-TTS-3) 0.3 mg/24 hr TD weekly patch, 0.3 mg, Transdermal, Weekly, Radha Steen MD, 0.3 mg at 01/12/24 1911    escitalopram (LEXAPRO) tablet 20 mg, 20 mg, Oral, Daily, Radha Steen MD, 20 mg at 01/15/24 0755    famotidine (PEPCID) tablet 40 mg, 40 mg, Oral, Daily, Radha Steen MD, 40 mg at 01/15/24 0755    gabapentin (NEURONTIN) capsule 300  mg, 300 mg, Oral, Daily, Radha Steen MD, 300 mg at 01/14/24 1425    hydrALAZINE (APRESOLINE) injection 20 mg, 20 mg, Intravenous, Q4H PRN, Court Edwards MD, 20 mg at 01/14/24 2236    hydrALAZINE (APRESOLINE) tablet 100 mg, 100 mg, Oral, TID, Quinten Henson MD, 100 mg at 01/14/24 2106    insulin lispro (HumaLOG) FOR PUMP REFILLS 300 Units, 300 Units, Subcutaneous Insulin Pump, Daily PRN, Radha Steen MD    labetalol (NORMODYNE) injection 10 mg, 10 mg, Intravenous, Q6H PRN, Radha Steen MD, 10 mg at 01/14/24 0456    labetalol (NORMODYNE) tablet 800 mg, 800 mg, Oral, TID, Radha Steen MD, 800 mg at 01/14/24 2106    lisinopril (ZESTRIL) tablet 40 mg, 40 mg, Oral, Daily, Quinten Henson MD, 40 mg at 01/14/24 0821    melatonin tablet 6 mg, 6 mg, Oral, HS PRN, Cinthia Dempsey MD    NIFEdipine (PROCARDIA XL) 24 hr tablet 60 mg, 60 mg, Oral, Q12H, Mason Herr MD, 60 mg at 01/14/24 2106    ondansetron (ZOFRAN) injection 4 mg, 4 mg, Intravenous, Q4H PRN, Radha Steen MD, 4 mg at 01/14/24 1351    oxyCODONE (ROXICODONE) split tablet 2.5 mg, 2.5 mg, Oral, Q4H PRN, 2.5 mg at 01/11/24 2001 **OR** oxyCODONE (ROXICODONE) IR tablet 5 mg, 5 mg, Oral, Q4H PRN, Radha Steen MD, 5 mg at 01/12/24 1438    PATIENT MAINTAINED INSULIN PUMP 1 each, 1 each, Subcutaneous, Q8H, Radha Steen MD, 1 each at 01/15/24 0759    polyethylene glycol (MIRALAX) packet 17 g, 17 g, Oral, Daily, Radha Steen MD, 17 g at 01/14/24 0822    prochlorperazine (COMPAZINE) tablet 5 mg, 5 mg, Oral, Q6H PRN, Radha Steen MD, 5 mg at 01/06/24 1217    senna-docusate sodium (SENOKOT S) 8.6-50 mg per tablet 1 tablet, 1 tablet, Oral, BID, Radha Steen MD, 1 tablet at 01/15/24 0755    Laboratory Results:  Results from last 7 days   Lab Units 01/15/24  0904 01/14/24  0604 01/13/24  0548 01/12/24  0623 01/11/24  0522 01/10/24  0557 01/09/24  0436   WBC Thousand/uL 8.66 9.53 7.83 7.25  --  6.49 10.48*   HEMOGLOBIN g/dL 8.9* 9.7* 9.5* 9.4*  --  8.2* 9.4*   HEMATOCRIT % 27.0* 28.6* 28.5* 28.4*  --   24.9* 27.9*   PLATELETS Thousands/uL 254 257 273 260  --  192 220   SODIUM mmol/L 132* 136 138 136 138 133* 134*   POTASSIUM mmol/L 5.1 4.5 4.3 4.4 4.1 4.4 4.3   CHLORIDE mmol/L 94* 97 98 97 99 92* 94*   CO2 mmol/L 25 23 27 26 28 23 25   BUN mg/dL 52* 39* 29* 37* 25 37* 41*   CREATININE mg/dL 11.46* 9.56* 7.07* 8.79* 6.69* 8.44* 9.84*   CALCIUM mg/dL 7.9* 7.9* 7.7* 8.3* 8.2* 8.6 8.5   MAGNESIUM mg/dL  --   --   --   --   --  2.2 2.1   PHOSPHORUS mg/dL  --   --   --   --   --  5.7* 6.2*       MRI brain wo contrast   Final Result by Willis Huddleston MD (01/14 1311)         1. Crescendo multifocal acute/recent infarctions involving at least 3 discrete vascular territories (left posterior cerebral artery and bilateral middle cerebral artery territories), worrisome for multifocal new/interval embolic infarctions. Differential    diagnosis could include watershed infarctions in the appropriate clinical setting. These recent/acute infarctions are new/superimposed in the setting of previously present smaller foci of diffusion restriction, indicative of progressive/new interval    infarctions since 1/8/2024   2. 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 No significant mass effect.   3. White matter changes likely correlate to areas of recent infarction.   4. Right mastoid air cell effusion. Mastoiditis not excluded.      Workstation performed: [id number]         CTA head and neck w wo contrast   Final Result by Justin Ho MD (01/13 1415)      CT brain:  Improved scattered subarachnoid hemorrhages (better delineated on the recent prior MRI examination) with no new intra or extra-axial hemorrhage.      CTA head: Negative for large vessel intracranial occlusion or hemodynamically significant stenosis.      CTA neck:  No extracranial carotid stenosis.  The cervical vertebral arteries are patent.          Workstation performed: [id number]         VAS transcranial doppler, complete study   Final Result by Dayne Burden DO (01/13 1147)      VAS transcranial doppler, complete study   Final Result by Ashley Poe MD (01/11 1121)      VAS transcranial doppler, complete study   Final Result by Ashley Poe MD (01/10 1824)      CT head wo contrast   Final Result by Makenzie Calvin MD (01/09 1453)      Improving subarachnoid hemorrhage compared to recent prior studies. No new findings.      Other stable and nonemergent findings above.         Workstation performed: [id number]         VAS transcranial doppler, complete study   Final Result by Dennis Paul DO (01/09 1147)      VAS transcranial doppler, complete study   Final Result by Dennis Paul DO (01/08 1614)      MRI brain w wo contrast   Final Result by E. Alec Schoenberger, MD (01/08 0859)      Stable hematoma in the right CP angle, prepontine and premedullary cisterns. New subarachnoid hemorrhage over the convexities and within the lateral ventricles likely due to redistribution.   Few tiny scattered recent infarcts in multiple vascular distributions.      Study marked in epic for notification.      Workstation performed: [id number]         MRI cervical spine w wo contrast   Final Result by E. Alec Schoenberger, MD (01/08 0858)      No identifiable etiology for subarachnoid hemorrhage.   Mild congenital canal stenosis.   New marrow edema at C5-C6 with severe disc space narrowing that is similar to recent CT. Modic type I endplate degenerative changes favored over infection but recommend clinical correlation and follow-up imaging if warranted.      Study marked in epic for notification.      Workstation performed: [id number]         VAS transcranial doppler, complete study   Final Result by Priscila Chavez MD (01/07 1736)      VAS transcranial doppler, complete study   Final Result by Priscila Chavez MD (01/07 1736)      IR cerebral angiography / intervention  "  Final Result by Yanira Moss (01/08 1187)      VAS transcranial doppler, complete study   Final Result by Dayne Burden DO (01/05 1442)      IR cerebral angiography    (Results Pending)   MRA head wo contrast    (Results Pending)   MRI brain w wo contrast    (Results Pending)   VAS renal artery complete    (Results Pending)   VAS transcranial doppler, complete study    (Results Pending)       Portions of the record may have been created with voice recognition software. Occasional wrong word or \"sound a like\" substitutions may have occurred due to the inherent limitations of voice recognition software. Read the chart carefully and recognize, using context, where substitutions have occurred.    "

## 2024-01-15 NOTE — ASSESSMENT & PLAN NOTE
Lab Results   Component Value Date    EGFR 4 01/15/2024    EGFR 6 01/14/2024    EGFR 8 01/13/2024    CREATININE 11.46 (H) 01/15/2024    CREATININE 9.56 (H) 01/14/2024    CREATININE 7.07 (H) 01/13/2024     On HD (MWF)

## 2024-01-15 NOTE — TELEPHONE ENCOUNTER
1/19/24 - PT STILL IN HOSPITAL    1/17/24 - PT STILL IN HOSPITAL    1/15/24 - PT IN [facility]  2/28/24 6 [facility] F/U W/MRA HEAD AND MRI BRAIN W/[facility]  6 week follow up with MRA head wo contrast and MRI brain w wo contrast with Daxa for [facility] follow up

## 2024-01-15 NOTE — CASE MANAGEMENT
Case Management Discharge Planning Note    Patient name Mateus Mckeon  Location [facility] 718/[facility] 718-01 MRN [id number]  : 1985 Date 1/15/2024       Current Admission Date: 2024  Current Admission Diagnosis:Acute CVA (cerebrovascular accident) ([facility])   Patient Active Problem List    Diagnosis Date Noted    Acute CVA (cerebrovascular accident) ([facility]) 2024    Primary hypertension 2024    Abnormal finding on MRI of brain 2024    Subarachnoid hemorrhage (HCC) 2024    Anemia due to chronic kidney disease, on chronic dialysis ([facility]) 2024    Type 1 diabetes mellitus on insulin therapy ([facility]) 2023    Hyperlipidemia 2023    Anxiety 2023    Insomnia 2023    RACHEAL (obstructive sleep apnea)     Status post placement of implantable loop recorder 2022    Coronary artery disease involving native coronary artery of native heart without angina pectoris 2022    Pulmonary HTN ([facility]) 2022    Hypothyroidism 2022    Cerebellar stroke syndrome 2022    Anemia in chronic kidney disease 2022    Essential (primary) hypertension 2022    Numbness of arm 2021    Mild episode of recurrent major depressive disorder ([facility]) 2021    Generalized anxiety disorder 2021    Medical cannabis use 04/15/2021    Tubulovillous adenoma of colon 2021    GERD (gastroesophageal reflux disease) 2021    End stage kidney disease ([facility]) 2020    Secondary hyperparathyroidism ([facility]) 10/23/2020    Diabetic neuropathy ([facility]) 2020    Provoked seizure (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    Renovascular hypertension 03/26/2018    Left atrial dilation 02/27/2018    Bilateral leg edema 02/19/2018    Persistent proteinuria 02/11/2018    Anemia in chronic kidney disease, on chronic dialysis (HCC) 02/10/2018    Hypertension 02/09/2018    Homozygous MTHFR mutation C677T 02/05/2018    Hyperphosphatemia 01/29/2018    Vitamin D deficiency 01/29/2018    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,   01/28/2018    Binocular visual disturbance 01/28/2018    History of lacunar cerebrovascular accident (CVA) 01/22/2018    Type 1 diabetes mellitus (HCC) 06/19/2017    Ataxia 07/21/2015    Gastroenteritis 07/21/2015      LOS (days): 10  Geometric Mean LOS (GMLOS) (days): 4.6  Days to GMLOS:-5.7     OBJECTIVE:  Risk of Unplanned Readmission Score: 35.28         Current admission status: Inpatient   Preferred Pharmacy:   [facility] [address] [place] [id number]  Phone: [phone] Fax: [phone]    [id number] [address] [facility]  [address] [facility] [address]  Phone: [phone] Fax: [phone]    Primary Care Provider: Dania Sher DO    Primary Insurance: MEDICARE  Secondary Insurance: [facility]    DISCHARGE DETAILS:              Additional Comments: Patient to remain IP for BP monitoring approx 48h. Pt has no needs and will DC home

## 2024-01-15 NOTE — PROGRESS NOTES
[facility]  Progress Note  Name: Mateus Mckeon I  MRN: [id number]  Unit/Bed#: [facility] 718-01 I Date of Admission: 1/5/2024   Date of Service: 1/15/2024 I Hospital Day: 10    Assessment/Plan   Subarachnoid hemorrhage (HCC)  Assessment & Plan  Right basilar cistern SAH of unclear etiology  BD 15, HH 2, MF 3  S/p Cerebral angiography x 2 - both negative (Dr. Mittal, 1/5/2024, 1/12/2024)  Presented on 1/4/2024 with MONTILLA that started on 12/31/23  Hx ASA use for hx multiple strokes, reversed with DDAVP on arrival  MRIs negative for source of SAH; small scattered acute/subacute infarcts noted.   Pt on hemodialysis for CKD.   1/12-1/13 started with new hand weakness and facial droop - multiple new areas of embolic infarcts to left PCA and bilateral MCA.    Imaging:  MRI brain wo, 1/14/2023: 1. Crescendo multifocal acute/recent infarctions involving at least 3 discrete vascular territories (left posterior cerebral artery and bilateral middle cerebral artery territories), worrisome for multifocal new/interval embolic infarctions. Differential diagnosis could include watershed infarctions in the appropriate clinical setting. These recent/acute infarctions are new/superimposed in the setting of previously present smaller foci of diffusion restriction, indicative of progressive/new interval infarctions since 1/8/2024 2. 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 No significant mass effect. 3. White matter changes likely correlate to areas of recent infarction. 4. Right mastoid air cell effusion. Mastoiditis not excluded.    Plan:  Angio negative x 2 - at this time, can dc SAH pathway.  Will plan for follow up in 6 weeks time with repeat MRI brain w wo contrast and MRA head wo contrast   Continue to monitor neuro exam closely.  GCS 15 with RUE weakness and ataxia, right facial droop  "corrected with smiling.  STAT CTA with decline in GCS > 2 pts in 1 hour.  Maintain SBP < 160 mmHg.  Pt with hx of strokes. Continue asa 81mg   Mobilize as tolerated.  Disposition: requesting ARC secondary to new strokes.  DVT ppx: SCD's, can resume dvt ppx at this time    Neurosurgery will continue to follow. Call with questions.      End stage kidney disease ([facility])  Assessment & Plan  Lab Results   Component Value Date    EGFR 4 01/15/2024    EGFR 6 01/14/2024    EGFR 8 01/13/2024    CREATININE 11.46 (H) 01/15/2024    CREATININE 9.56 (H) 01/14/2024    CREATININE 7.07 (H) 01/13/2024     On HD (MWF)    Type 1 diabetes mellitus ([facility])  Assessment & Plan  Lab Results   Component Value Date    HGBA1C 6.0 (H) 01/14/2024       Recent Labs     01/15/24  0036 01/15/24  0709 01/15/24  0845 01/15/24  1302   POCGLU 133 126 187* 128         Blood Sugar Average: Last 72 hrs:  (P) [id number]    Continue management per primary team    * Acute CVA (cerebrovascular accident) ([facility])  Assessment & Plan  Neurology following  Started with new onset right hand weakness and right facial droop 1/12-1/13 concerning for new stroke  MRI brain confirms new multifocal infarcts  CTA head and neck without any acute findings  Asa has been resumed 1/12             Subjective/Objective   Chief Complaint: \"I'm ok.\"    Subjective: Pt relates he is still having trouble lifting his right arm but has seen some improvement.    Objective: NAD. RUE weakness 3/5.     I/O         01/13 0701  01/14 0700 01/14 0701  01/15 0700 01/15 0701  01/16 0700    P.O. 100 440 240    I.V. (mL/kg)   470 (4.9)    Other       Total Intake(mL/kg) 100 (1.1) 440 (4.6) 710 (7.5)    Urine (mL/kg/hr) 0 (0)      Other   1659    Stool       Blood       Total Output 0  1659    Net +100 +440 -949           Unmeasured Urine Occurrence  0 x             Invasive Devices       Peripheral Intravenous Line  Duration             Peripheral IV 01/13/24 Left;Ventral (anterior) Forearm 2 " "days              Line  Duration             Hemodialysis AV Fistula 11/09/20 Left Other (Comment) 1162 days                    Physical Exam:  Vitals: Blood pressure (!) 200/103, pulse 75, temperature 98.6 °F (37 °C), temperature source Oral, resp. rate 18, height 5' 5\" (1.651 m), weight 95.1 kg (209 lb 10.5 oz), SpO2 96%.,Body mass index is 34.89 kg/m².    Hemodynamic Monitoring: MAP:      General appearance: alert, appears stated age, cooperative and no distress  Head: Normocephalic, without obvious abnormality, atraumatic  Eyes: PERRL  Neck: supple, symmetrical, trachea midline   Lungs: non labored breathing  Heart: regular heart rate  Neurologic:   Mental status: Alert, oriented x3, thought content appropriate  Cranial nerves: grossly intact (Cranial nerves II-XII), slight right facial droop  Sensory: normal to LT  Motor: RUE 3/5, ataxic        Lab Results:  Results from last 7 days   Lab Units 01/15/24  0904 01/14/24  0604 01/13/24  0548 01/12/24  0623 01/10/24  0557   WBC Thousand/uL 8.66 9.53 7.83   < > 6.49   HEMOGLOBIN g/dL 8.9* 9.7* 9.5*   < > 8.2*   HEMATOCRIT % 27.0* 28.6* 28.5*   < > 24.9*   PLATELETS Thousands/uL 254 257 273   < > 192   NEUTROS PCT % 67  --  69  --  76*   MONOS PCT % 8  --  7  --  9   EOS PCT % 7*  --  6  --  0    < > = values in this interval not displayed.     Results from last 7 days   Lab Units 01/15/24  0904 01/14/24  0604 01/13/24  0548 01/12/24  0623 01/11/24  0522   SODIUM mmol/L 132* 136 138   < > 138   POTASSIUM mmol/L 5.1 4.5 4.3   < > 4.1   CHLORIDE mmol/L 94* 97 98   < > 99   CO2 mmol/L 25 23 27   < > 28   BUN mg/dL 52* 39* 29*   < > 25   CREATININE mg/dL 11.46* 9.56* 7.07*   < > 6.69*   CALCIUM mg/dL 7.9* 7.9* 7.7*   < > 8.2*   ALK PHOS U/L  --   --   --   --  64   ALT U/L  --   --   --   --  9   AST U/L  --   --   --   --  13    < > = values in this interval not displayed.     Results from last 7 days   Lab Units 01/10/24  0557 01/09/24  0436   MAGNESIUM mg/dL 2.2 2.1 " "    Results from last 7 days   Lab Units 01/10/24  0557 01/09/24  0436   PHOSPHORUS mg/dL 5.7* 6.2*     Results from last 7 days   Lab Units 01/12/24  0623   INR  1.05   PTT seconds 30     No results found for: \"TROPONINT\"  ABG:  Lab Results   Component Value Date    PHART 7.454 (H) 02/21/2020    NKR9LWO 33.2 (L) 02/21/2020    PO2ART 170.0 (H) 02/21/2020    FGI8YVH 22.8 02/21/2020    BEART -0.8 02/21/2020    SOURCE Radial, Right 02/21/2020       Imaging Studies: I have personally reviewed pertinent reports.   and I have personally reviewed pertinent films in PACS    CT head wo contrast    Result Date: 1/9/2024  Impression: Improving subarachnoid hemorrhage compared to recent prior studies. No new findings. Other stable and nonemergent findings above. Workstation performed: [id number]     MRI brain w wo contrast    Result Date: 1/8/2024  Impression: Stable hematoma in the right CP angle, prepontine and premedullary cisterns. New subarachnoid hemorrhage over the convexities and within the lateral ventricles likely due to redistribution. Few tiny scattered recent infarcts in multiple vascular distributions. Study marked in epic for notification. Workstation performed: [id number]     MRI cervical spine w wo contrast    Result Date: 1/8/2024  Impression: No identifiable etiology for subarachnoid hemorrhage. Mild congenital canal stenosis. New marrow edema at C5-C6 with severe disc space narrowing that is similar to recent CT. Modic type I endplate degenerative changes favored over infection but recommend clinical correlation and follow-up imaging if warranted. Study marked in [employer] for notification. Workstation performed: [id number]     CTA head and neck with and without contrast    Result Date: 1/5/2024  Impression: 1.  Small amount of subarachnoid hemorrhage in the right basilar cistern region is again seen without significant change since the prior CT brain exam of the same day. No enhancing brain mass/fluid collection or " evidence of vascular malformation. Major intracranial dural venous sinuses are grossly patent. 2.  Mild scattered calcific atherosclerosis of the carotid bifurcation and cavernous carotid segments bilaterally without significant stenosis. Bilateral major cervical/intracranial arteries are patent with normal course and caliber. No arterial occlusion, significant flow-limiting stenosis, or focal saccular aneurysm identified 3.  Nonspecific 4 mm right upper lobe lung nodule is seen. No routine follow-up imaging recommended per current Fleischner Society guidelines for low risk patient. 4.  Opacification of the right mastoid air cells could be secondary to mastoid effusion or mastoiditis, recommend clinical correlation with ENT exam findings. Left mastoid air cells and paranasal sinuses appear well aerated and clear. Workstation performed: [id number]     CT head without contrast    Result Date: 1/5/2024  Impression: 1.  Right prepontine/premedullary cistern subarachnoid hemorrhage. Minimal mass effect on the right middle cerebellar peduncle. Short-term follow-up is recommended. 2.  Opacification of the right mastoid air cells correlate clinically for mastoiditis. I personally discussed this study with REYNALDO MULLER on 1/5/2024 3:43 AM. Workstation performed: [id number]       EKG, Pathology, and Other Studies: I have personally reviewed pertinent reports.      VTE Pharmacologic Prophylaxis: Sequential compression device (Venodyne)     VTE Mechanical Prophylaxis: sequential compression device

## 2024-01-15 NOTE — ASSESSMENT & PLAN NOTE
Lab Results   Component Value Date    HGBA1C 6.0 (H) 01/14/2024       Recent Labs     01/15/24  0036 01/15/24  0709 01/15/24  0845 01/15/24  1302   POCGLU 133 126 187* 128     Managed with insulin pump.    Blood Sugar Average: Last 72 hrs:  (P) 157.0081347077717169

## 2024-01-15 NOTE — PROGRESS NOTES
"[facility]  Progress Note  Name: Mateus Mckeon I  MRN: [id number]  Unit/Bed#: PPHP 718-01 I Date of Admission: 1/5/2024   Date of Service: 1/15/2024 I Hospital Day: 10    Assessment/Plan   Subarachnoid hemorrhage (HCC)  Assessment & Plan  Presented with significant headaches started since 12/31/2023  Right basilar cistern SAH of unclear etiology  S/p Cerebral angiography x 2 - both negative (Dr. Mittal, 1/5/2024, 1/12/2024)4.   On ASA for previous strokes reversed with DDAVP.  MRIs negative for source of SAH, small scattered acute/subacute infarcts noted.   CT head wo 1/9/24: Improving subarachnoid hemorrhage compared to recent prior studies. No new findings.     Plan:  ASA 81 mg daily resumed and Nimodipine discontinued per NSX recs 1/12.  No further need for SAH pathway per NSx.  Continue neurochecks  BP control has been very challenging. Goal of SBP<160      * Acute CVA (cerebrovascular accident) (HCC)  Assessment & Plan  New onset RUE weakness with mild facial droop  MRI brain \"Crescendo multifocal acute/recent infarctions involving at least 3 discrete vascular territories (left posterior cerebral artery and bilateral middle cerebral artery territories), worrisome for multifocal new/interval embolic infarctions\"  Continue neuro-checks  ASA 81 mg daily and atorvastatin 40mg daily.   Telemetry.  No events  Echo 1/5 no PFO  Cleared for neurology standpoint   PT/OT/PMR evaluation.    Hypertensive emergency  Assessment & Plan  Initially on Cardene drip while in ICU.   Currently on weekly clonidine patch , hydralazine 100mg TID, lisinopril 40mg qd, labetalol 800 mg TID, procardia (increased to 60mg am/60mg pm)  Prn IV labetalol and hydralazine - has been receiving multiple doses  Further volume removal by HD  D/u with ICU attending Dr Dinesh Dempsey to assess patient for Cardene drip who deferred further management to nephrology team.  Discussed with nephrology. Appreciate " input. increase his Procardia to 60mg AM/90mg PM and change clonidine patch to oral clonidine.     Type 1 diabetes mellitus ([facility])  Assessment & Plan  Lab Results   Component Value Date    HGBA1C 6.0 (H) 01/14/2024       Recent Labs     01/15/24  0036 01/15/24  0709 01/15/24  0845 01/15/24  1302   POCGLU 133 126 187* 128     Managed with insulin pump.    Blood Sugar Average: Last 72 hrs:  (P) 157.3055413949140502      End stage kidney disease ([facility])  Assessment & Plan  Dialysis per nephrology     Anemia in chronic kidney disease, on chronic dialysis ([facility])  Assessment & Plan  Lab Results   Component Value Date    EGFR 4 01/15/2024    EGFR 6 01/14/2024    EGFR 8 01/13/2024    CREATININE 11.46 (H) 01/15/2024    CREATININE 9.56 (H) 01/14/2024    CREATININE 7.07 (H) 01/13/2024   hgb is stable             VTE Pharmacologic Prophylaxis: VTE Score: 7 Moderate Risk (Score 3-4) - Pharmacological DVT Prophylaxis Contraindicated. Sequential Compression Devices Ordered.    Mobility:   Basic Mobility Inpatient Raw Score: 18  JH-HLM Goal: 6: Walk 10 steps or more  JH-HLM Achieved: 3: Sit at edge of bed  HLM Goal NOT achieved. Continue with multidisciplinary rounding and encourage appropriate mobility to improve upon HLM goals.    Patient Centered Rounds: I performed bedside rounds with nursing staff today.   Discussions with Specialists or Other Care Team Provider: Nephrology, ICU    Education and Discussions with Family / Patient: Updated  (mother) via phone.    Total Time Spent on Date of Encounter in care of patient: 50 mins. This time was spent on one or more of the following: performing physical exam; counseling and coordination of care; obtaining or reviewing history; documenting in the medical record; reviewing/ordering tests, medications or procedures; communicating with other healthcare professionals and discussing with patient's family/caregivers.    Current Length of Stay: 10 day(s)  Current Patient Status:  Inpatient   Certification Statement: The patient will continue to require additional inpatient hospital stay due to Uncontrolled HTN  Discharge Plan: Anticipate discharge in 48-72 hrs to home with home services.    Code Status: Level 1 - Full Code    Subjective:   Continues to report mild headache. Feels very tired today post dialysis. No SOB/CP    Objective:     Vitals:   Temp (24hrs), Av.2 °F (36.8 °C), Min:97.8 °F (36.6 °C), Max:98.6 °F (37 °C)    Temp:  [97.8 °F (36.6 °C)-98.6 °F (37 °C)] 98.6 °F (37 °C)  HR:  [66-80] 75  Resp:  [16-20] 18  BP: (163-200)/() 189/85  SpO2:  [96 %-98 %] 97 %  Body mass index is 34.89 kg/m².     Input and Output Summary (last 24 hours):     Intake/Output Summary (Last 24 hours) at 1/15/2024 1603  Last data filed at 1/15/2024 1218  Gross per 24 hour   Intake 910 ml   Output 1659 ml   Net -749 ml       Physical Exam:   Physical Exam  Vitals and nursing note reviewed.   Constitutional:       General: He is not in acute distress.     Appearance: He is well-developed. He is ill-appearing.   HENT:      Head: Normocephalic and atraumatic.   Eyes:      Conjunctiva/sclera: Conjunctivae normal.   Cardiovascular:      Rate and Rhythm: Normal rate and regular rhythm.      Heart sounds: No murmur heard.  Pulmonary:      Effort: Pulmonary effort is normal. No respiratory distress.      Breath sounds: Normal breath sounds.   Abdominal:      Palpations: Abdomen is soft.      Tenderness: There is no abdominal tenderness.   Musculoskeletal:         General: No swelling.      Cervical back: Neck supple.   Skin:     General: Skin is warm and dry.      Capillary Refill: Capillary refill takes less than 2 seconds.   Neurological:      Mental Status: He is alert.      Motor: Weakness (RUE) present.   Psychiatric:         Mood and Affect: Mood normal.          Additional Data:     Labs:  Results from last 7 days   Lab Units 01/15/24  0904   WBC Thousand/uL 8.66   HEMOGLOBIN g/dL 8.9*   HEMATOCRIT  % 27.0*   PLATELETS Thousands/uL 254   NEUTROS PCT % 67   LYMPHS PCT % 16   MONOS PCT % 8   EOS PCT % 7*     Results from last 7 days   Lab Units 01/15/24  0904 01/12/24  0623 01/11/24  0522   SODIUM mmol/L 132*   < > 138   POTASSIUM mmol/L 5.1   < > 4.1   CHLORIDE mmol/L 94*   < > 99   CO2 mmol/L 25   < > 28   BUN mg/dL 52*   < > 25   CREATININE mg/dL 11.46*   < > 6.69*   ANION GAP mmol/L 13   < > 11   CALCIUM mg/dL 7.9*   < > 8.2*   ALBUMIN g/dL  --   --  3.3*   TOTAL BILIRUBIN mg/dL  --   --  0.53   ALK PHOS U/L  --   --  64   ALT U/L  --   --  9   AST U/L  --   --  13   GLUCOSE RANDOM mg/dL 189*   < > 153*    < > = values in this interval not displayed.     Results from last 7 days   Lab Units 01/12/24  0623   INR  1.05     Results from last 7 days   Lab Units 01/15/24  1302 01/15/24  0845 01/15/24  0709 01/15/24  0036 01/14/24  2120 01/14/24  1539 01/14/24  1352 01/14/24  1158 01/14/24  0827 01/14/24  0012 01/13/24  1812 01/13/24  1146   POC GLUCOSE mg/dl 128 187* 126 133 175* 225* 236* 145* 171* 116 168* 164*     Results from last 7 days   Lab Units 01/14/24  0604   HEMOGLOBIN A1C % 6.0*           Lines/Drains:  Invasive Devices       Peripheral Intravenous Line  Duration             Peripheral IV 01/13/24 Left;Ventral (anterior) Forearm 2 days              Line  Duration             Hemodialysis AV Fistula 11/09/20 Left Other (Comment) 1162 days                          Imaging: Reviewed radiology reports from this admission including: CT head    Recent Cultures (last 7 days):         Last 24 Hours Medication List:   Current Facility-Administered Medications   Medication Dose Route Frequency Provider Last Rate    aspirin  81 mg Oral Daily Malu Moore PA-C      atorvastatin  40 mg Oral QPM Radha Steen MD      bisacodyl  10 mg Rectal Daily PRN Radha Steen MD      calcium acetate  667 mg Oral TID With Meals Radha Steen MD      cinacalcet  60 mg Oral Daily Radha Steen MD      cloNIDine  0.2 mg Oral Q8H HALIE Sewell  Reyes Bahamonde, MD      escitalopram  20 mg Oral Daily Radha Steen MD      famotidine  40 mg Oral Daily Radha Steen MD      gabapentin  300 mg Oral Daily Radha Steen MD      hydrALAZINE  20 mg Intravenous Q4H PRN Court Edwards MD      hydrALAZINE  100 mg Oral TID Quinten Henson MD      insulin lispro  300 Units Subcutaneous Insulin Pump Daily PRN Radha Steen MD      labetalol  10 mg Intravenous Q6H PRN Radha Steen MD      labetalol  800 mg Oral TID Radha Steen MD      lisinopril  40 mg Oral Daily Quinten Henson MD      melatonin  6 mg Oral HS PRN Cinthia Dempsey MD      [START ON 1/16/2024] NIFEdipine  60 mg Oral QAM Mason Herr MD      NIFEdipine  90 mg Oral HS Mason Herr MD      ondansetron  4 mg Intravenous Q4H PRN Radha Steen MD      oxyCODONE  2.5 mg Oral Q4H PRN Radha Steen MD      Or    oxyCODONE  5 mg Oral Q4H PRN Radha Steen MD      patient maintained insulin pump  1 each Subcutaneous Q8H Radha Steen MD      polyethylene glycol  17 g Oral Daily Radha Steen MD      prochlorperazine  5 mg Oral Q6H PRN Radha Steen MD      senna-docusate sodium  1 tablet Oral BID Radha Steen MD          Today, Patient Was Seen By: Mason Herr MD    **Please Note: This note may have been constructed using a voice recognition system.**

## 2024-01-15 NOTE — ASSESSMENT & PLAN NOTE
Lab Results   Component Value Date    EGFR 4 01/15/2024    EGFR 6 01/14/2024    EGFR 8 01/13/2024    CREATININE 11.46 (H) 01/15/2024    CREATININE 9.56 (H) 01/14/2024    CREATININE 7.07 (H) 01/13/2024   hgb is stable

## 2024-01-15 NOTE — ASSESSMENT & PLAN NOTE
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 Mittal, 1/5/2024, 1/12/2024)4.   On ASA for previous strokes reversed with DDAVP.  MRIs negative for source of SAH, small scattered acute/subacute infarcts noted.   CT head wo 1/9/24: Improving subarachnoid hemorrhage compared to recent prior studies. No new findings.     Plan:  ASA 81 mg daily resumed and Nimodipine discontinued per NSX recs 1/12.  No further need for SAH pathway per NSx.  Continue neurochecks  BP control has been very challenging. Goal of SBP<160

## 2024-01-15 NOTE — ASSESSMENT & PLAN NOTE
Neurology following  Started with new onset right hand weakness and right facial droop 1/12-1/13 concerning for new stroke  MRI brain confirms new multifocal infarcts  CTA head and neck without any acute findings  Asa has been resumed 1/12

## 2024-01-15 NOTE — RESTORATIVE TECHNICIAN NOTE
Restorative Technician Note      Patient Name: Mateus Mckeon     Note Type: Mobility (Pt currently off the unit.)    Savi Plunkett BS, Restorative Technician,

## 2024-01-15 NOTE — ASSESSMENT & PLAN NOTE
"New onset RUE weakness with mild facial droop  MRI brain \"Crescendo multifocal acute/recent infarctions involving at least 3 discrete vascular territories (left posterior cerebral artery and bilateral middle cerebral artery territories), worrisome for multifocal new/interval embolic infarctions\"  Continue neuro-checks  ASA 81 mg daily and atorvastatin 40mg daily.   Telemetry.  No events  Echo 1/5 no PFO  Cleared for neurology standpoint   PT/OT> recommen HHS  "

## 2024-01-15 NOTE — ASSESSMENT & PLAN NOTE
"Anesthesia Transfer of Care Note    Patient: Allyssa Wright    Procedure(s) Performed: Procedure(s) (LRB):  ELECTROCONVULSIVE THERAPY (ECT) - SINGLE SEIZURE (N/A)    Patient location: PACU    Anesthesia Type: general    Transport from OR: Transported from OR on room air with adequate spontaneous ventilation    Post pain: adequate analgesia    Post assessment: no apparent anesthetic complications    Post vital signs: stable    Level of consciousness: awake, oriented and alert    Nausea/Vomiting: no nausea/vomiting    Complications: none          Last vitals:   Visit Vitals  BP (!) 110/57 (BP Location: Left arm, Patient Position: Lying)   Pulse 91   Temp 37 °C (98.6 °F) (Temporal)   Resp 16   Ht 5' 5" (1.651 m)   Wt 70.3 kg (155 lb)   SpO2 97%   Breastfeeding? No   BMI 25.79 kg/m²     " "New onset RUE weakness with mild facial droop 1/12-13  MRI brain \"Crescendo multifocal acute/recent infarctions involving at least 3 discrete vascular territories (left posterior cerebral artery and bilateral middle cerebral artery territories), worrisome for multifocal new/interval embolic infarctions\"  Continue neuro-checks  ASA 81 mg daily and atorvastatin 40mg daily.   Telemetry.  No events  Echo 1/5 no PFO  Cleared for neurology standpoint   Patient will need to Follow up with Neurosurgery in 6 weeks for MRI  PT/OT/PMR  "

## 2024-01-15 NOTE — PLAN OF CARE
Target UF Goal  1.5  L as tolerated. Patient dialyzing for 4 hours on 3 K bath for serum K of  4.5  per protocol. Treatment plan reviewed with Nephrology.   Problem: METABOLIC, FLUID AND ELECTROLYTES - ADULT  Goal: Electrolytes maintained within normal limits  Description: INTERVENTIONS:  - Monitor labs and assess patient for signs and symptoms of electrolyte imbalances  - Administer electrolyte replacement as ordered  - Monitor response to electrolyte replacements, including repeat lab results as appropriate  - Instruct patient on fluid and nutrition as appropriate  Outcome: Progressing  Goal: Fluid balance maintained  Description: INTERVENTIONS:  - Monitor labs   - Monitor I/O and WT  - Instruct patient on fluid and nutrition as appropriate  - Assess for signs & symptoms of volume excess or deficit  Outcome: Progressing   Post-Dialysis RN Treatment Note    Blood Pressure:  Pre 167/97 mm/Hg  Post 198/101 mmHg   EDW  93 kg    Weight:  Pre 94.5 kg   Post 93.4 kg   Mode of weight measurement: Bed Scale   Volume Removed  1159 ml    Treatment duration 200 minutes    NS given  No    Treatment shortened? Yes, describe: as per patient demand, Dr. Reyes okayed   Medications given during Rx Labetalol 10 mg   Estimated Kt/V  0.77   Access type: AV fistula   Access Issues: No    Report called to primary nurse   Yes Jan Leal RN

## 2024-01-15 NOTE — CONSULTS
PHYSICAL MEDICINE AND REHABILITATION CONSULT NOTE  Mateus Mckeon 38 y.o. male MRN: [id number]  [facility] Encounter: [id number]    Requested by (Physician/Service): Mason Herr MD  Reason for Consultation:  Assessment of rehabilitation needs  Reason for Hospitalization:  Brain bleed (HCC) [I61.9]  Rehabilitation Diagnosis: Stroke, left brain with right hemibody paresis    History of Present Illness:  Mateus Mckeon is a 38 y.o. 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), Esophagitis (07/21/2015), Falls, Gastroparesis, GERD (gastroesophageal reflux disease), History of shingles (2010), History of transfusion (02/2018), Hyperlipidemia, Hyperphosphatemia, Hypertension, Hypoglycemia (07/15/2022), Itching, Mastoiditis of right side (07/15/2022), Muscle weakness, Obesity (BMI 30.0-34.9) (09/09/2019), Orthostatic hypotension (10/25/2020), Peripheral polyneuropathy (11/20/2019), PONV (postoperative nausea and vomiting) (01/26/2018), Protein-calorie malnutrition (HCC) (11/23/2020), Recurrent peritonitis (HCC) due to peritoneal dialysis catheter (07/31/2020), Retinopathy, Seizures (HCC), Skin abnormality, Spontaneous bacterial peritonitis (HCC) (10/19/2020), Squamous cell skin cancer, Stroke (HCC), Swelling of both lower extremities, Traumatic onycholysis (07/21/2022), Vomiting, and Wears glasses. who presented to the [facility] on 1/4/24 with progressively worsening headache. He was found on imaging to have a subarachnoid hemorrhage and was admitted to the ICU for further work-up and monitoring. Neurosurgery has been involved in his care since admission. He underwent cerebral angiogram with neurosurgery  on 1/5/24 and 1/12/24 both negative for SAH source. On repeat CT imaging on 1/9/24 SAH appeared to be improving. On 1/13/24, neurology was consulted due to concerns for new RUE weakness. MR brain showed multifocal acute/recent infarctions in 3 discrete vascular territories (left PCA, bilateral MCAs) concerning for embolic infarcts. Currently, his infarcts are suspected to be a complication of the cerebral angiogram.    His course has also been complicated by hypertension with concerns for hypertensive emergency in the setting of SAH and he has required aggressive blood pressure medication management, initially on a nicardipine continuous infusion, now on PO medications, however with frequent ongoing hydralazine and labetalol PRN IV requirements to maintain adequate control.     Of note, Mr. Hilario has a history of prothrombin gene mutation and MTHFR gene mutation with a history of a prior cerebellar stroke in 2015 and a small pontine infarct in 2018.     Discussed rehabilitation care in detail with the patient's mother at bedside today.    PM&R consulted for rehabilitation recommendations.    Restrictions include:  none    Hospital Complications/Comorbidities:   Complications: As above  Comorbidities: As above    Morbid Obesity (BMI > 40) No     Last Weight Last BMI   Wt Readings from Last 1 Encounters:   01/15/24 95.1 kg (209 lb 10.5 oz)    Body mass index is 34.89 kg/m².     Functional History:     Prior to Admission:     Functional Status: Patient was independent with mobility/ambulation, transfers, ADL's, IADL's.     Present:  Physical Therapy Occupational Therapy   Supervision for bed mobility, min assist for transfers, min assist for ambulation Supervision to min assist with ADLs     Past Medical History:   Past Surgical History:   Family History:     Past Medical History:   Diagnosis Date    Acute kidney injury (HCC)     Ambulates with cane     Anuria     Anxiety     Cellulitis of right elbow 03/31/2021     Chronic kidney disease     Depression     Diabetes mellitus (HCC)     Diarrhea     Emesis 10/24/2020    End stage renal disease (HCC) 02/11/2018    Formatting of this note might be different from the original. Last Assessment & Plan:  Secondary to DM.  On nightly PD.  Followed by Nephro.  Patient considering transplant for kidney and pancreas through [facility] Formatting of this note might be different from the original. Last Assessment & Plan:  Formatting of this note might be different from the original. 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    Esophagitis 07/21/2015    Falls     Gastroparesis     GERD (gastroesophageal reflux disease)     History of shingles 2010    History of transfusion 02/2018    no adverse reaction    Hyperlipidemia     Hyperphosphatemia     Hypertension     Hypoglycemia 07/15/2022    Itching     Mastoiditis of right side 07/15/2022    Muscle weakness     general unsteadiness    Obesity (BMI 30.0-34.9) 09/09/2019    Orthostatic hypotension 10/25/2020    Peripheral polyneuropathy 11/20/2019    PONV (postoperative nausea and vomiting) 01/26/2018    Protein-calorie malnutrition (HCC) 11/23/2020    Recurrent peritonitis (HCC) due to peritoneal dialysis catheter 07/31/2020    Retinopathy     Seizures (HCC)     early 2020 - one time    Skin abnormality     some dime size areas where skin was scratched from itching    Spontaneous bacterial peritonitis (HCC) 10/19/2020    Squamous cell skin cancer     left temple    Stroke (HCC)     x2 - off balance/no driving/fatigue    Swelling of both lower extremities     Traumatic onycholysis 07/21/2022    Vomiting     Wears glasses     Past Surgical History:   Procedure Laterality Date    CARDIAC ELECTROPHYSIOLOGY PROCEDURE N/A 9/21/2023    Procedure: Cardiac loop recorder explant;  Surgeon: Parish Morgan MD;  Location: [facility];  Service: Cardiology    CARDIAC LOOP RECORDER  05/2018    COLONOSCOPY      EGD       EYE SURGERY Right     IR AV FISTULAGRAM/GRAFTOGRAM  02/23/2021    IR CEREBRAL ANGIOGRAPHY  1/12/2024    IR CEREBRAL ANGIOGRAPHY / INTERVENTION  1/5/2024    IR TUNNELED CENTRAL LINE PLACEMENT  02/16/2021    IR TUNNELED DIALYSIS CATHETER PLACEMENT  11/18/2020    IR TUNNELED DIALYSIS CATHETER REMOVAL  02/12/2021    IR TUNNELED DIALYSIS CATHETER REMOVAL  03/11/2021    MOHS SURGERY Left 12/14/2022    Left temple with Dr. Hassan    PERITONEAL CATHETER INSERTION N/A 08/27/2018    Procedure: UNROOF PD CATHETER;  Surgeon: Felipe Lindo DO;  Location: [facility];  Service: General    NC ARTERIOVENOUS ANASTOMOSIS OPEN DIRECT Left 11/09/2020    Procedure: CREATION FISTULA  ARTERIOVENOUS (AV) - LEFT WRIST;  Surgeon: Placido Altamirano MD;  Location: [facility];  Service: Vascular    NC ESOPHAGOGASTRODUODENOSCOPY TRANSORAL DIAGNOSTIC N/A 04/18/2019    Procedure: ESOPHAGOGASTRODUODENOSCOPY (EGD);  Surgeon: Ale Figueroa MD;  Location: [facility];  Service: Gastroenterology    NC LAPS INSERTION TUNNELED INTRAPERITONEAL CATHETER N/A 08/06/2018    Procedure: LAPAROSCOPIC PD CATHETER PLACEMENT;  Surgeon: Felipe Lindo DO;  [facility];  Service: General    NC REMOVAL TUNNELED INTRAPERITONEAL CATHETER N/A 11/18/2020    Procedure: REMOVAL CATHETER PERITONEAL DIALYSIS;  Surgeon: Abdifatah Ty MD;  [facility];  Service: General    TONSILLECTOMY      UPPER GASTROINTESTINAL ENDOSCOPY       Family History   Problem Relation Age of Onset    Breast cancer Mother     Hypertension Mother     Hyperlipidemia Father     Hypertension Father     Leukemia Maternal Grandmother     Hyperlipidemia Maternal Grandfather     Hypertension Maternal Grandfather     Hyperlipidemia Paternal Grandmother     Hypertension Paternal Grandmother     Heart disease Paternal Grandfather         cardiac disorder    Diabetes Paternal Grandfather         Medications:    Current Facility-Administered Medications:     aspirin chewable tablet 81 mg, 81 mg,  Oral, Daily, Malu Moore PA-C, 81 mg at 01/15/24 0755    atorvastatin (LIPITOR) tablet 40 mg, 40 mg, Oral, QPM, Radha Steen MD, 40 mg at 01/14/24 1804    bisacodyl (DULCOLAX) rectal suppository 10 mg, 10 mg, Rectal, Daily PRN, Radha Steen MD    calcium acetate (PHOSLO) capsule 667 mg, 667 mg, Oral, TID With Meals, Radha Steen MD, 667 mg at 01/15/24 1607    cinacalcet (SENSIPAR) tablet 60 mg, 60 mg, Oral, Daily, Radha Steen MD, 60 mg at 01/15/24 0755    cloNIDine (CATAPRES) tablet 0.2 mg, 0.2 mg, Oral, Q8H HALIE, Joselyn Reyes Bahamonde, MD, 0.2 mg at 01/15/24 1608    escitalopram (LEXAPRO) tablet 20 mg, 20 mg, Oral, Daily, Radha Steen MD, 20 mg at 01/15/24 0755    famotidine (PEPCID) tablet 40 mg, 40 mg, Oral, Daily, Radha Steen MD, 40 mg at 01/15/24 0755    gabapentin (NEURONTIN) capsule 300 mg, 300 mg, Oral, Daily, Radha Steen MD, 300 mg at 01/15/24 1320    hydrALAZINE (APRESOLINE) injection 20 mg, 20 mg, Intravenous, Q4H PRN, Court Edwards MD, 20 mg at 01/15/24 1414    hydrALAZINE (APRESOLINE) tablet 100 mg, 100 mg, Oral, TID, Quinten Henson MD, 100 mg at 01/15/24 1607    insulin lispro (HumaLOG) FOR PUMP REFILLS 300 Units, 300 Units, Subcutaneous Insulin Pump, Daily PRN, Radha Steen MD    labetalol (NORMODYNE) injection 10 mg, 10 mg, Intravenous, Q6H PRN, Radha Steen MD, 10 mg at 01/15/24 1207    labetalol (NORMODYNE) tablet 800 mg, 800 mg, Oral, TID, Radha Steen MD, 800 mg at 01/15/24 1607    lisinopril (ZESTRIL) tablet 40 mg, 40 mg, Oral, Daily, Quinten Henson MD, 40 mg at 01/15/24 1321    melatonin tablet 6 mg, 6 mg, Oral, HS PRN, Cinthia Dempsey MD    [START ON 1/16/2024] NIFEdipine (PROCARDIA XL) 24 hr tablet 60 mg, 60 mg, Oral, QAM, Mason Herr MD    NIFEdipine (PROCARDIA XL) 24 hr tablet 90 mg, 90 mg, Oral, HS, Mason Herr MD    ondansetron (ZOFRAN) injection 4 mg, 4 mg, Intravenous, Q4H PRN, Radha Steen MD, 4 mg at 01/14/24 1351    oxyCODONE (ROXICODONE) split tablet 2.5 mg, 2.5 mg, Oral, Q4H PRN, 2.5 mg  at 24 **OR** oxyCODONE (ROXICODONE) IR tablet 5 mg, 5 mg, Oral, Q4H PRN, Radha Steen MD, 5 mg at 01/15/24 1320    PATIENT MAINTAINED INSULIN PUMP 1 each, 1 each, Subcutaneous, Q8H, Radha Steen MD, 1 each at 01/15/24 1609    polyethylene glycol (MIRALAX) packet 17 g, 17 g, Oral, Daily, Radha Steen MD, 17 g at 24 0822    prochlorperazine (COMPAZINE) tablet 5 mg, 5 mg, Oral, Q6H PRN, Radha Steen MD, 5 mg at 24 1217    senna-docusate sodium (SENOKOT S) 8.6-50 mg per tablet 1 tablet, 1 tablet, Oral, BID, Radha Steen MD, 1 tablet at 01/15/24 0755    Allergies:   Allergies   Allergen Reactions    Sulfa Antibiotics Rash      Social History:    Social History     Socioeconomic History    Marital status: Single     Spouse name: None    Number of children: None    Years of education: None    Highest education level: None   Occupational History    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   Tobacco Use    Smoking status: Former     Current packs/day: 0.00     Average packs/day: 0.5 packs/day for 12.0 years (6.0 ttl pk-yrs)     Types: Cigarettes     Start date: 2006     Quit date: 2018     Years since quittin.9    Smokeless tobacco: Never    Tobacco comments:     quit 2018   Vaping Use    Vaping status: Every Day    Substances: THC, CBD   Substance and Sexual Activity    Alcohol use: Not Currently    Drug use: Yes     Types: Marijuana     Comment: medical marijuana    Sexual activity: None   Other Topics Concern    None   Social History Narrative    Caffeine use    single     Social Determinants of Health     Financial Resource Strain: Low Risk  (2023)    Overall Financial Resource Strain (CARDIA)     Difficulty of Paying Living Expenses: Not hard at all   Food Insecurity: No Food Insecurity (2024)    Hunger Vital Sign     Worried About Running Out of Food in the Last Year: Never true     Ran Out of Food in the Last Year: Never true   Transportation Needs: No Transportation Needs  (1/5/2024)    PRAPARE - Transportation     Lack of Transportation (Medical): No     Lack of Transportation (Non-Medical): No   Physical Activity: Not on file   Stress: Not on file   Social Connections: Not on file   Intimate Partner Violence: Not on file   Housing Stability: Unknown (1/5/2024)    Housing Stability Vital Sign     Unable to Pay for Housing in the Last Year: No     Number of Places Lived in the Last Year: Not on file     Unstable Housing in the Last Year: No     Mateus Mckeon Lives with: lives with their family.  He lives in a(n) single family home  The living area: bedroom on 2nd floor and bathroom on 2nd floor, 1/2 bath on first level, could set up living area on 1st floor  There 2 steps to enter the home.    Patient/family's goals: Return to previous home/apartment.  The patient will have 24 hour ARC Supervision/physical assistance: supervision/physical assistance available upon discharge.    Review of Systems: A 10-point review of systems was performed. Negative except as listed above.     Physical Exam:  Vital Signs:      Temp:  [97.8 °F (36.6 °C)-98.8 °F (37.1 °C)] 98.8 °F (37.1 °C)  HR:  [66-80] 71  Resp:  [16-20] 16  BP: (163-200)/() 196/95   Intake/Output Summary (Last 24 hours) at 1/15/2024 1655  Last data filed at 1/15/2024 1218  Gross per 24 hour   Intake 910 ml   Output 1659 ml   Net -749 ml        Laboratory:      Lab Results   Component Value Date    HGB 8.9 (L) 01/15/2024    HGB 11.6 (L) 10/28/2015    HCT 27.0 (L) 01/15/2024    HCT 32.1 (L) 10/28/2015    WBC 8.66 01/15/2024    WBC 8.54 10/28/2015     Lab Results   Component Value Date    BUN 52 (H) 01/15/2024    BUN 34 (H) 11/03/2015     10/28/2015    K 5.1 01/15/2024    K 3.6 10/28/2015    CL 94 (L) 01/15/2024     10/28/2015    GLUCOSE 275 (H) 02/21/2020    GLUCOSE 92 10/28/2015    CREATININE 11.46 (H) 01/15/2024    CREATININE 1.90 (H) 11/03/2015     Lab Results   Component Value Date    PROTIME 13.6 01/12/2024     INR 1.05 01/12/2024        GEN: Patient seen resting comfortably in hospital bed, NAD  HEENT: NCAT; mucous membranes moist  PULM: Breathing comfortably on room air  ABD: Non-distended  SKIN: No obvious rashes or lesions on exposed skin  NEURO:  Asleep, awakens spontaneously to voice, alert but returns to sleep, answering questions appropriately to context  Moving all extremities spontaneously in bed    Imaging: Reviewed  CT head wo contrast    Result Date: 1/9/2024  Impression: Improving subarachnoid hemorrhage compared to recent prior studies. No new findings. Other stable and nonemergent findings above. Workstation performed: [id number]     MRI brain w wo contrast    Result Date: 1/8/2024  Impression: Stable hematoma in the right CP angle, prepontine and premedullary cisterns. New subarachnoid hemorrhage over the convexities and within the lateral ventricles likely due to redistribution. Few tiny scattered recent infarcts in multiple vascular distributions. Study marked in epic for notification. Workstation performed: [id number]     MRI cervical spine w wo contrast    Result Date: 1/8/2024  Impression: No identifiable etiology for subarachnoid hemorrhage. Mild congenital canal stenosis. New marrow edema at C5-C6 with severe disc space narrowing that is similar to recent CT. Modic type I endplate degenerative changes favored over infection but recommend clinical correlation and follow-up imaging if warranted. Study marked in [employer] for notification. Workstation performed: [id number]     CTA head and neck with and without contrast    Result Date: 1/5/2024  Impression: 1.  Small amount of subarachnoid hemorrhage in the right basilar cistern region is again seen without significant change since the prior CT brain exam of the same day. No enhancing brain mass/fluid collection or evidence of vascular malformation. Major intracranial dural venous sinuses are grossly patent. 2.  Mild scattered calcific atherosclerosis of  the carotid bifurcation and cavernous carotid segments bilaterally without significant stenosis. Bilateral major cervical/intracranial arteries are patent with normal course and caliber. No arterial occlusion, significant flow-limiting stenosis, or focal saccular aneurysm identified 3.  Nonspecific 4 mm right upper lobe lung nodule is seen. No routine follow-up imaging recommended per current Fleischner Society guidelines for low risk patient. 4.  Opacification of the right mastoid air cells could be secondary to mastoid effusion or mastoiditis, recommend clinical correlation with ENT exam findings. Left mastoid air cells and paranasal sinuses appear well aerated and clear. Workstation performed: [id number]     CT head without contrast    Result Date: 1/5/2024  Impression: 1.  Right prepontine/premedullary cistern subarachnoid hemorrhage. Minimal mass effect on the right middle cerebellar peduncle. Short-term follow-up is recommended. 2.  Opacification of the right mastoid air cells correlate clinically for mastoiditis. I personally discussed this study with REYNALDO MULLER on 1/5/2024 3:43 AM. Workstation performed: [id number]     Assessment and Recommendations:  Mr. Paulo Mckeon is a 38 year old gentleman with impaired mobility and ADLs in the setting of multifocal embolic strokes involving the bilateral MCA territories and left PCA territory suspected secondary to complication related to cerebral angiogram performed to assess etiology of SAH. PM&R consulted for rehabilitation recommendations.     Impairments:  Impaired functional mobility and ability to perform ADL's    Recommendations:  - Ongoing stroke work-up per neurology and primary service  - Neurology for post-stroke medical optimization  - Monitor closely for neurogenic bowel and bladder  - Delirium precautions:  - Maintain adequate nutrition  - Avoid sedating and anticholinergic medications as possible  - Promote regular sleep/wake cycles and proper  sleep hygiene  - Monitor closely for right hemiparetic shoulder pain and post-stroke pain  - Spasticity: Monitor closely for emerging muscle hypertonicity and spasticity involving the RUE. Consider use of neutral resting wrist splint for the right wrist and bilateral multipodus boots to be worn at night and while in bed during the day to prevent developing spasticity and contracture  - Pain: Per primary service.  - Skin: Monitor for breakdown, frequent turns  - Given Mr. Mckeon's current level of medical complexity, available support system of family members and caregivers at home, current level of function significantly below baseline and expected functional prognosis, he would make an ideal candidate for inpatient rehabilitation; however he will need to demonstrate tolerance for IPR levels of therapy (3 hours/day, 5-6 days/week), which unfortunately has been an issue related to fatigue and interest as well as hypertension. Discussed with patient and patient's mother that home with outpatient therapies may be a reasonable option as well if the patient does not want to spend more time in the hospital setting and mother/father feeling comfortable caring for him at this current level at home.     Thank you for allowing the PM&R service to participate in the care of this patient. We will continue to follow Mateus Mckeon's progress with you. Please do not hesitate to call with questions or concerns    Willis Santo MD  Attending Physician  Physical Medicine and Rehabilitation  [facility]    ** Please Note: Flu[employer] Direct voice to text software may have been used in the creation of this document. **

## 2024-01-16 ENCOUNTER — APPOINTMENT (INPATIENT)
Dept: NON INVASIVE DIAGNOSTICS | Facility: HOSPITAL | Age: 39
DRG: 064 | End: 2024-01-16
Payer: MEDICARE

## 2024-01-16 LAB
ANION GAP SERPL CALCULATED.3IONS-SCNC: 9 MMOL/L
BUN SERPL-MCNC: 28 MG/DL (ref 5–25)
CALCIUM SERPL-MCNC: 8 MG/DL (ref 8.4–10.2)
CHLORIDE SERPL-SCNC: 101 MMOL/L (ref 96–108)
CO2 SERPL-SCNC: 28 MMOL/L (ref 21–32)
CREAT SERPL-MCNC: 7.91 MG/DL (ref 0.6–1.3)
ERYTHROCYTE [DISTWIDTH] IN BLOOD BY AUTOMATED COUNT: 14.5 % (ref 11.6–15.1)
GFR SERPL CREATININE-BSD FRML MDRD: 7 ML/MIN/1.73SQ M
GLUCOSE SERPL-MCNC: 109 MG/DL (ref 65–140)
GLUCOSE SERPL-MCNC: 131 MG/DL (ref 65–140)
GLUCOSE SERPL-MCNC: 137 MG/DL (ref 65–140)
GLUCOSE SERPL-MCNC: 196 MG/DL (ref 65–140)
HCT VFR BLD AUTO: 25.3 % (ref 36.5–49.3)
HGB BLD-MCNC: 8.4 G/DL (ref 12–17)
MCH RBC QN AUTO: 31.8 PG (ref 26.8–34.3)
MCHC RBC AUTO-ENTMCNC: 33.2 G/DL (ref 31.4–37.4)
MCV RBC AUTO: 96 FL (ref 82–98)
PLATELET # BLD AUTO: 260 THOUSANDS/UL (ref 149–390)
PMV BLD AUTO: 10.8 FL (ref 8.9–12.7)
POTASSIUM SERPL-SCNC: 4.7 MMOL/L (ref 3.5–5.3)
RBC # BLD AUTO: 2.64 MILLION/UL (ref 3.88–5.62)
SODIUM SERPL-SCNC: 138 MMOL/L (ref 135–147)
WBC # BLD AUTO: 7.73 THOUSAND/UL (ref 4.31–10.16)

## 2024-01-16 PROCEDURE — 99233 SBSQ HOSP IP/OBS HIGH 50: CPT | Performed by: FAMILY MEDICINE

## 2024-01-16 PROCEDURE — 93975 VASCULAR STUDY: CPT

## 2024-01-16 PROCEDURE — 93975 VASCULAR STUDY: CPT | Performed by: SURGERY

## 2024-01-16 PROCEDURE — 80048 BASIC METABOLIC PNL TOTAL CA: CPT | Performed by: INTERNAL MEDICINE

## 2024-01-16 PROCEDURE — 82948 REAGENT STRIP/BLOOD GLUCOSE: CPT

## 2024-01-16 PROCEDURE — 97110 THERAPEUTIC EXERCISES: CPT

## 2024-01-16 PROCEDURE — 97168 OT RE-EVAL EST PLAN CARE: CPT

## 2024-01-16 PROCEDURE — 99232 SBSQ HOSP IP/OBS MODERATE 35: CPT | Performed by: PHYSICIAN ASSISTANT

## 2024-01-16 PROCEDURE — 97164 PT RE-EVAL EST PLAN CARE: CPT

## 2024-01-16 PROCEDURE — 85027 COMPLETE CBC AUTOMATED: CPT | Performed by: INTERNAL MEDICINE

## 2024-01-16 PROCEDURE — 99232 SBSQ HOSP IP/OBS MODERATE 35: CPT | Performed by: STUDENT IN AN ORGANIZED HEALTH CARE EDUCATION/TRAINING PROGRAM

## 2024-01-16 RX ORDER — NIFEDIPINE 30 MG/1
90 TABLET, EXTENDED RELEASE ORAL 2 TIMES DAILY
Status: DISCONTINUED | OUTPATIENT
Start: 2024-01-16 | End: 2024-01-17

## 2024-01-16 RX ORDER — LABETALOL HYDROCHLORIDE 5 MG/ML
40 INJECTION, SOLUTION INTRAVENOUS ONCE
Status: COMPLETED | OUTPATIENT
Start: 2024-01-16 | End: 2024-01-16

## 2024-01-16 RX ORDER — NITROGLYCERIN 20 MG/100ML
5-200 INJECTION INTRAVENOUS
Status: DISCONTINUED | OUTPATIENT
Start: 2024-01-16 | End: 2024-01-23 | Stop reason: HOSPADM

## 2024-01-16 RX ORDER — ESCITALOPRAM OXALATE 20 MG/1
20 TABLET ORAL
Status: DISCONTINUED | OUTPATIENT
Start: 2024-01-17 | End: 2024-01-23 | Stop reason: HOSPADM

## 2024-01-16 RX ADMIN — ASPIRIN 81 MG CHEWABLE TABLET 81 MG: 81 TABLET CHEWABLE at 08:26

## 2024-01-16 RX ADMIN — CINACALCET 60 MG: 30 TABLET ORAL at 08:27

## 2024-01-16 RX ADMIN — LISINOPRIL 40 MG: 20 TABLET ORAL at 08:27

## 2024-01-16 RX ADMIN — LABETALOL HYDROCHLORIDE 800 MG: 200 TABLET, FILM COATED ORAL at 21:15

## 2024-01-16 RX ADMIN — LABETALOL HYDROCHLORIDE 800 MG: 200 TABLET, FILM COATED ORAL at 08:27

## 2024-01-16 RX ADMIN — GABAPENTIN 300 MG: 300 CAPSULE ORAL at 15:09

## 2024-01-16 RX ADMIN — CALCIUM ACETATE 667 MG: 667 CAPSULE ORAL at 12:40

## 2024-01-16 RX ADMIN — LABETALOL HYDROCHLORIDE 800 MG: 200 TABLET, FILM COATED ORAL at 15:08

## 2024-01-16 RX ADMIN — FAMOTIDINE 40 MG: 20 TABLET, FILM COATED ORAL at 08:27

## 2024-01-16 RX ADMIN — SENNOSIDES, DOCUSATE SODIUM 1 TABLET: 8.6; 5 TABLET ORAL at 08:26

## 2024-01-16 RX ADMIN — HYDRALAZINE HYDROCHLORIDE 100 MG: 50 TABLET, FILM COATED ORAL at 08:26

## 2024-01-16 RX ADMIN — CLONIDINE HYDROCHLORIDE 0.3 MG: 0.2 TABLET ORAL at 05:25

## 2024-01-16 RX ADMIN — ESCITALOPRAM OXALATE 20 MG: 20 TABLET ORAL at 08:27

## 2024-01-16 RX ADMIN — HYDRALAZINE HYDROCHLORIDE 100 MG: 50 TABLET, FILM COATED ORAL at 15:09

## 2024-01-16 RX ADMIN — CALCIUM ACETATE 667 MG: 667 CAPSULE ORAL at 15:09

## 2024-01-16 RX ADMIN — ATORVASTATIN CALCIUM 40 MG: 40 TABLET, FILM COATED ORAL at 17:58

## 2024-01-16 RX ADMIN — SENNOSIDES, DOCUSATE SODIUM 1 TABLET: 8.6; 5 TABLET ORAL at 17:58

## 2024-01-16 RX ADMIN — OXYCODONE HYDROCHLORIDE 5 MG: 5 TABLET ORAL at 12:41

## 2024-01-16 RX ADMIN — NITROGLYCERIN 125 MCG/MIN: 20 INJECTION INTRAVENOUS at 17:52

## 2024-01-16 RX ADMIN — CLONIDINE HYDROCHLORIDE 0.3 MG: 0.2 TABLET ORAL at 15:08

## 2024-01-16 RX ADMIN — NITROGLYCERIN 140 MCG/MIN: 20 INJECTION INTRAVENOUS at 11:07

## 2024-01-16 RX ADMIN — Medication 40 MG: at 01:32

## 2024-01-16 RX ADMIN — CALCIUM ACETATE 667 MG: 667 CAPSULE ORAL at 08:27

## 2024-01-16 RX ADMIN — OXYCODONE HYDROCHLORIDE 5 MG: 5 TABLET ORAL at 05:28

## 2024-01-16 RX ADMIN — NIFEDIPINE 90 MG: 30 TABLET, FILM COATED, EXTENDED RELEASE ORAL at 21:15

## 2024-01-16 RX ADMIN — NITROGLYCERIN 5 MCG/MIN: 20 INJECTION INTRAVENOUS at 04:08

## 2024-01-16 RX ADMIN — CLONIDINE HYDROCHLORIDE 0.3 MG: 0.2 TABLET ORAL at 21:15

## 2024-01-16 RX ADMIN — HYDRALAZINE HYDROCHLORIDE 100 MG: 50 TABLET, FILM COATED ORAL at 21:15

## 2024-01-16 RX ADMIN — NIFEDIPINE 60 MG: 30 TABLET, FILM COATED, EXTENDED RELEASE ORAL at 08:26

## 2024-01-16 NOTE — PROGRESS NOTES
Patients BP is 197/93. SLIM notified that patient is not due for prn BP meds at this time. PRN IV labetalol changed to be given very four hours as needed. Will continue to monitor patient.

## 2024-01-16 NOTE — PROGRESS NOTES
[facility]  Progress Note  Name: Mateus Mckeon I  MRN: [id number]  Unit/Bed#: [facility] 718-01 I Date of Admission: 1/5/2024   Date of Service: 1/16/2024 I Hospital Day: 11    Assessment/Plan   Hypertensive emergency  Assessment & Plan  Nitro Drip started on 1/16/2023  Currently on weekly clonidine patch , hydralazine 100mg TID, lisinopril 40mg qd, labetalol 800 mg TID, procardia (increased to 60mg am/90mg pm)  Prn IV labetalol and hydralazine - has been receiving multiple doses  Further volume removal by HD  Discussed with nephrology. Appreciate input.       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 Mittal, 1/5/2024, 1/12/2024)4.   On ASA for previous strokes reversed with DDAVP.  MRIs negative for source of SAH, small scattered acute/subacute infarcts noted.   CT head wo 1/9/24: Improving subarachnoid hemorrhage compared to recent prior studies. No new findings.     Plan:  ASA 81 mg daily resumed and Nimodipine discontinued per NSX recs 1/12.  No further need for SAH pathway per NSx.  Continue neurochecks  BP control has been very challenging. Goal of SBP<160      End stage kidney disease (HCC)  Assessment & Plan  Dialysis per nephrology     Anemia in chronic kidney disease, on chronic dialysis (HCC)  Assessment & Plan  Lab Results   Component Value Date    EGFR 7 01/16/2024    EGFR 4 01/15/2024    EGFR 6 01/14/2024    CREATININE 7.91 (H) 01/16/2024    CREATININE 11.46 (H) 01/15/2024    CREATININE 9.56 (H) 01/14/2024   hgb is stable  Continue to monitor     Type 1 diabetes mellitus (HCC)  Assessment & Plan  Lab Results   Component Value Date    HGBA1C 6.0 (H) 01/14/2024       Recent Labs     01/15/24  0845 01/15/24  1302 01/15/24  1602 01/16/24  0703   POCGLU 187* 128 181* 131     Managed with insulin  "pump.    Blood Sugar Average: Last 72 hrs:  (P) 159.3125    Blood glucose very well controlled  Continue using pump  Continue to monitor     * Acute CVA (cerebrovascular accident) (HCC)  Assessment & Plan  New onset RUE weakness with mild facial droop 1/12-13  MRI brain \"Crescendo multifocal acute/recent infarctions involving at least 3 discrete vascular territories (left posterior cerebral artery and bilateral middle cerebral artery territories), worrisome for multifocal new/interval embolic infarctions\"  Continue neuro-checks  ASA 81 mg daily and atorvastatin 40mg daily.   Telemetry.  No events  Echo 1/5 no PFO  Cleared for neurology standpoint   Patient will need to Follow up with Neurosurgery in 6 weeks for MRI  PT/OT/PMR               VTE Pharmacologic Prophylaxis: VTE Score: 7 Moderate Risk (Score 3-4) - Pharmacological DVT Prophylaxis Contraindicated. Sequential Compression Devices Ordered.    Mobility:   Basic Mobility Inpatient Raw Score: 18  JH-HLM Goal: 6: Walk 10 steps or more  JH-HLM Achieved: 6: Walk 10 steps or more  HLM Goal achieved. Continue to encourage appropriate mobility.    Patient Centered Rounds: I performed bedside rounds with nursing staff today.   Discussions with Specialists or Other Care Team Provider: Nephrology regarding blood pressure management.  PT regarding discharge planning.  Neurosurgery regarding further management.    Education and Discussions with Family / Patient: Updated  (mother) at bedside.    Total Time Spent on Date of Encounter in care of patient: 45 mins. This time was spent on one or more of the following: performing physical exam; counseling and coordination of care; obtaining or reviewing history; documenting in the medical record; reviewing/ordering tests, medications or procedures; communicating with other healthcare professionals and discussing with patient's family/caregivers.    Current Length of Stay: 11 day(s)  Current Patient Status: " Inpatient   Certification Statement: The patient will continue to require additional inpatient hospital stay due to CVA, HTN emergency   Discharge Plan: Anticipate discharge in 24-48 hrs to rehab facility.    Code Status: Level 1 - Full Code    Subjective:   Is a very pleasant 38-year-old gentleman who was seen evaluated today at bedside.  Patient complained that he had a headache which is most likely due to the nitroglycerin drip that he is on.  Spoke to patient and mother in regards to the plan with his blood pressure as well as getting him to acute rehab.    Objective:     Vitals:   Temp (24hrs), Av.2 °F (36.8 °C), Min:97.6 °F (36.4 °C), Max:98.8 °F (37.1 °C)    Temp:  [97.6 °F (36.4 °C)-98.8 °F (37.1 °C)] 97.6 °F (36.4 °C)  HR:  [64-77] 70  Resp:  [16-23] 16  BP: (160-221)/() 160/77  SpO2:  [96 %-99 %] 99 %  Body mass index is 34.89 kg/m².     Input and Output Summary (last 24 hours):   No intake or output data in the 24 hours ending 24 1254      Physical Exam:   Physical Exam  Vitals reviewed.   Constitutional:       General: He is not in acute distress.     Appearance: Normal appearance. He is ill-appearing.   HENT:      Head: Normocephalic and atraumatic.      Right Ear: External ear normal.      Left Ear: External ear normal.      Nose: Nose normal.   Eyes:      Extraocular Movements: Extraocular movements intact.      Conjunctiva/sclera: Conjunctivae normal.   Cardiovascular:      Rate and Rhythm: Normal rate and regular rhythm.      Pulses: Normal pulses.      Heart sounds: Normal heart sounds.   Pulmonary:      Effort: Pulmonary effort is normal.      Breath sounds: Normal breath sounds.   Abdominal:      General: Abdomen is flat.      Palpations: Abdomen is soft.   Musculoskeletal:         General: Normal range of motion.      Cervical back: Normal range of motion.   Skin:     General: Skin is warm and dry.   Neurological:      Mental Status: He is alert.      Comments: RUE weakness    Psychiatric:         Mood and Affect: Mood normal.         Behavior: Behavior normal.          Additional Data:     Labs:  Results from last 7 days   Lab Units 01/16/24  0525 01/15/24  0904   WBC Thousand/uL 7.73 8.66   HEMOGLOBIN g/dL 8.4* 8.9*   HEMATOCRIT % 25.3* 27.0*   PLATELETS Thousands/uL 260 254   NEUTROS PCT %  --  67   LYMPHS PCT %  --  16   MONOS PCT %  --  8   EOS PCT %  --  7*     Results from last 7 days   Lab Units 01/16/24  0525 01/12/24  0623 01/11/24  0522   SODIUM mmol/L 138   < > 138   POTASSIUM mmol/L 4.7   < > 4.1   CHLORIDE mmol/L 101   < > 99   CO2 mmol/L 28   < > 28   BUN mg/dL 28*   < > 25   CREATININE mg/dL 7.91*   < > 6.69*   ANION GAP mmol/L 9   < > 11   CALCIUM mg/dL 8.0*   < > 8.2*   ALBUMIN g/dL  --   --  3.3*   TOTAL BILIRUBIN mg/dL  --   --  0.53   ALK PHOS U/L  --   --  64   ALT U/L  --   --  9   AST U/L  --   --  13   GLUCOSE RANDOM mg/dL 109   < > 153*    < > = values in this interval not displayed.     Results from last 7 days   Lab Units 01/12/24  0623   INR  1.05     Results from last 7 days   Lab Units 01/16/24  1200 01/16/24  0703 01/15/24  1602 01/15/24  1302 01/15/24  0845 01/15/24  0709 01/15/24  0036 01/14/24  2120 01/14/24  1539 01/14/24  1352 01/14/24  1158 01/14/24  0827   POC GLUCOSE mg/dl 137 131 181* 128 187* 126 133 175* 225* 236* 145* 171*     Results from last 7 days   Lab Units 01/14/24  0604   HEMOGLOBIN A1C % 6.0*           Lines/Drains:  Invasive Devices       Peripheral Intravenous Line  Duration             Peripheral IV 01/13/24 Left;Ventral (anterior) Forearm 3 days              Line  Duration             Hemodialysis AV Fistula 11/09/20 Left Other (Comment) 1163 days                          Imaging: Reviewed radiology reports from this admission including: MRI brain    Recent Cultures (last 7 days):         Last 24 Hours Medication List:   Current Facility-Administered Medications   Medication Dose Route Frequency Provider Last Rate    aspirin  81  mg Oral Daily TRACY ChristiansonC      atorvastatin  40 mg Oral QPM Radha Steen MD      bisacodyl  10 mg Rectal Daily PRN Radha Steen MD      calcium acetate  667 mg Oral TID With Meals Radha Steen MD      cinacalcet  60 mg Oral Daily Radha Steen MD      cloNIDine  0.3 mg Oral Q8H HALIE Valeria Forrester PA-C      [START ON 1/17/2024] escitalopram  20 mg Oral HS Bryan Claire MD      famotidine  40 mg Oral Daily Radha Steen MD      gabapentin  300 mg Oral Daily Radha Steen MD      hydrALAZINE  20 mg Intravenous Q4H PRN Court Edwards MD      hydrALAZINE  100 mg Oral TID Quinten Henson MD      insulin lispro  300 Units Subcutaneous Insulin Pump Daily PRN Radha Steen MD      labetalol  20 mg Intravenous Q4H PRN Valeria Forrester PA-C      labetalol  800 mg Oral TID Radha Steen MD      lisinopril  40 mg Oral Daily Quinten Henson MD      melatonin  6 mg Oral HS PRN Cinthia Dempsey MD      NIFEdipine  90 mg Oral BID Joselyn Reyes Bahamonde, MD      nitroGLYcerin  5-200 mcg/min Intravenous Titrated Valeria Forrester PA-C 145 mcg/min (01/16/24 1240)    ondansetron  4 mg Intravenous Q4H PRN Radha Steen MD      oxyCODONE  2.5 mg Oral Q4H PRN Radha Steen MD      Or    oxyCODONE  5 mg Oral Q4H PRN Radha Steen MD      patient maintained insulin pump  1 each Subcutaneous Q8H Radha Steen MD      polyethylene glycol  17 g Oral Daily Radha Steen MD      prochlorperazine  5 mg Oral Q6H PRN Radha Steen MD      senna-docusate sodium  1 tablet Oral BID Radha Steen MD          Today, Patient Was Seen By: Bryan Claire MD    **Please Note: This note may have been constructed using a voice recognition system.**

## 2024-01-16 NOTE — ASSESSMENT & PLAN NOTE
Lab Results   Component Value Date    HGBA1C 6.0 (H) 01/14/2024       Recent Labs     01/15/24  1302 01/15/24  1602 01/16/24  0703 01/16/24  1200   POCGLU 128 181* 131 137         Blood Sugar Average: Last 72 hrs:  (P) 158    Continue management per primary team

## 2024-01-16 NOTE — PHYSICAL THERAPY NOTE
Physical Therapy Re-evaluation    Patient Name: Mateus Mckeon    Today's Date: 1/16/2024     Problem List  Principal Problem:    Acute CVA (cerebrovascular accident) (HCC)  Active Problems:    Type 1 diabetes mellitus (HCC)    Hyperphosphatemia    Anemia in chronic kidney disease, on chronic dialysis (HCC)    End stage kidney disease (HCC)    Subarachnoid hemorrhage (HCC)    Hypertensive emergency       Past Medical History  Past Medical History:   Diagnosis Date    Acute kidney injury (HCC)     Ambulates with cane     Anuria     Anxiety     Cellulitis of right elbow 03/31/2021    Chronic kidney disease     Depression     Diabetes mellitus (HCC)     Diarrhea     Emesis 10/24/2020    End stage renal disease (HCC) 02/11/2018    Formatting of this note might be different from the original. Last Assessment & Plan:  Secondary to DM.  On nightly PD.  Followed by Nephro.  [facility] Formatting of this note might be different from the original. Last Assessment & Plan:  Formatting of this note might be different from the original. 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    Esophagitis 07/21/2015    Falls     Gastroparesis     GERD (gastroesophageal reflux disease)     History of shingles 2010    History of transfusion 02/2018    no adverse reaction    Hyperlipidemia     Hyperphosphatemia     Hypertension     Hypoglycemia 07/15/2022    Itching     Mastoiditis of right side 07/15/2022    Muscle weakness     general unsteadiness    Obesity (BMI 30.0-34.9) 09/09/2019    Orthostatic hypotension 10/25/2020    Peripheral polyneuropathy 11/20/2019    PONV (postoperative nausea and vomiting) 01/26/2018    Protein-calorie malnutrition (HCC) 11/23/2020    Recurrent peritonitis (HCC) due to peritoneal dialysis catheter 07/31/2020    Retinopathy     Seizures ([facility])     early 2020 -  one time    Skin abnormality     some dime size areas where skin was scratched from itching    Spontaneous bacterial peritonitis (HCC) 10/19/2020    Squamous cell skin cancer     left temple    Stroke (HCC)     x2 - off balance/no driving/fatigue    Swelling of both lower extremities     Traumatic onycholysis 07/21/2022    Vomiting     Wears glasses         Past Surgical History  Past Surgical History:   Procedure Laterality Date    CARDIAC ELECTROPHYSIOLOGY PROCEDURE N/A 9/21/2023    Procedure: Cardiac loop recorder explant;  Surgeon: Parish Morgan MD;  Location: [facility] CARD[facility]AC [facility] LAB;  Service: Cardiology    CARDIAC LOOP RECORDER  05/2018    COLONOSCOPY      EGD      EYE SURGERY Right     IR AV FISTULAGRAM/GRAFTOGRAM  02/23/2021    IR CEREBRAL ANGIOGRAPHY  1/12/2024    IR CEREBRAL ANGIOGRAPHY / INTERVENTION  1/5/2024    IR TUNNELED CENTRAL LINE PLACEMENT  02/16/2021    IR TUNNELED DIALYSIS CATHETER PLACEMENT  11/18/2020    IR TUNNELED DIALYSIS CATHETER REMOVAL  02/12/2021    IR TUNNELED DIALYSIS CATHETER REMOVAL  03/11/2021    MOHS SURGERY Left 12/14/2022    Left temple with Dr. Hassan    PERITONEAL CATHETER INSERTION N/A 08/27/2018    Procedure: UNROOF PD CATHETER;  Surgeon: Felipe Lindo DO;  Location: [facility] Main OR;  Service: General    NE ARTERIOVENOUS ANASTOMOSIS OPEN DIRECT Left 11/09/2020    Procedure: CREATION FISTULA  ARTERIOVENOUS (AV) - LEFT WRIST;  Surgeon: Placido Altamirano MD;  Location: [facility];  Service: Vascular    NE ESOPHAGOGASTRODUODENOSCOPY TRANSORAL DIAGNOSTIC N/A 04/18/2019    Procedure: ESOPHAGOGASTRODUODENOSCOPY (EGD);  Surgeon: Ale Figueroa MD;  Location: [facility] GI LAB;  Service: Gastroenterology    NE LAPS INSERTION TUNNELED INTRAPERITONEAL CATHETER N/A 08/06/2018    Procedure: LAPAROSCOPIC PD CATHETER PLACEMENT;  Surgeon: Felipe Lindo DO;  Location: [facility];  Service: General    NE REMOVAL TUNNELED INTRAPERITONEAL CATHETER N/A 11/18/2020    Procedure: REMOVAL CATHETER PERITONEAL  DIALYSIS;  Surgeon: Abdifatah Ty MD;  Location: [facility];  Service: General    TONSILLECTOMY      UPPER GASTROINTESTINAL ENDOSCOPY           01/16/24 1140   PT Last Visit   PT Visit Date 01/16/24   Note Type   Note type Re-Evaluation   Pain Assessment   Pain Assessment Tool FLACC   Pain Rating: FLACC (Rest) - Face 0   Pain Rating: FLACC (Rest) - Legs 0   Pain Rating: FLACC (Rest) - Activity 0   Pain Rating: FLACC (Rest) - Cry 0   Pain Rating: FLACC (Rest) - Consolability 0   Score: FLACC (Rest) 0   Pain Rating: FLACC (Activity) - Face 0   Pain Rating: FLACC (Activity) - Legs 0   Pain Rating: FLACC (Activity) - Activity 0   Pain Rating: FLACC (Activity) - Cry 0   Pain Rating: FLACC (Activity) - Consolability 0   Score: FLACC (Activity) 0   Restrictions/Precautions   Weight Bearing Precautions Per Order No   Other Precautions Cognitive;Chair Alarm;Bed Alarm;Multiple lines;Telemetry;Fall Risk;Visual impairment  (SBP <160, RUE hemiparesis)   Home Living   Type of Home House   Home Layout Two level;Stairs to enter with rails;Bed/bath upstairs  (2 NIRU, full flight up to 2nd floor bed/bath)   Bathroom Shower/Tub Tub/shower unit   Bathroom Toilet Standard   Bathroom Equipment Shower chair   Home Equipment Cane  (for community PTA)   Prior Function   Level of [place] Independent with functional mobility;Independent with ADLs;Needs assistance with IADLS   Lives With Family  (parents)   Receives Help From Family   IADLs Independent with meal prep;Independent with medication management;Family/Friend/Other provides transportation   Falls in the last 6 months 1 to 4   General   Additional Pertinent History pt with RUE weakness and mild facial droop since last PT session and found to have acute CVA   Family/Caregiver Present Yes  (mother)   Cognition   Arousal/Participation Cooperative   Attention Attends with cues to redirect   Orientation Level Oriented X4   Following Commands Follows multistep commands with increased  time or repetition   Comments appears frustrated with medical status   Subjective   Subjective agreeable to participate.   RUE Assessment   RUE Assessment   (impaired sensation compared to L (distal>proximal impaired), able to perform shoulder shrug and retraction, 75% scaption, 2-/5 elbow flexion, 0/5 finger flexion/extension-defer to OT for further assessment)   LUE Assessment   LUE Assessment WFL   RLE Assessment   RLE Assessment   (4+/5 grossly)   LLE Assessment   LLE Assessment   (4+/5 grossly)   Light Touch   RLE Light Touch Impaired   LLE Light Touch Grossly intact   Bed Mobility   Supine to Sit 4  Minimal assistance   Additional items Assist x 1;Increased time required;Verbal cues;LE management   Sit to Supine 5  Supervision   Additional items Increased time required;Verbal cues   Transfers   Sit to Stand 4  Minimal assistance   Additional items Assist x 1;Increased time required;Verbal cues   Stand to Sit 4  Minimal assistance   Additional items Assist x 1;Increased time required;Verbal cues   Additional Comments HHA   Ambulation/Elevation   Gait pattern Improper Weight shift;Decreased foot clearance;Short stride;Excessively slow   Gait Assistance 4  Minimal assist   Additional items Assist x 1;Verbal cues  (+SBA of 2nd for safety)   Assistive Device   (HHA)   Distance 10'x2  (denied further ambulation attempt)   Stair Management Assistance Not tested   Ambulation/Elevation Additional Comments +unsteady   Balance   Static Sitting Fair -   Dynamic Sitting Poor +   Static Standing Poor   Dynamic Standing Poor   Ambulatory Poor   Endurance Deficit   Endurance Deficit Yes   Endurance Deficit Description fatigue, R hemiparesis   Activity Tolerance   Activity Tolerance Patient limited by fatigue   Medical Staff Made Aware JANNETH DUNLAP-performed IE with supervision of myself   Nurse Made Aware yes-cleared   Assessment   Prognosis Fair   Problem List Decreased strength;Impaired balance;Decreased endurance;Decreased  "mobility;Decreased coordination;Decreased cognition;Impaired sensation   Assessment Pt seen for PT re-evaluation due to new onset RUE weakness and mild facial droop 1/12-13. MRI revealed \"MRI brain \"Crescendo multifocal acute/recent infarctions involving at least 3 discrete vascular territories (left posterior cerebral artery and bilateral middle cerebral artery territories), worrisome for multifocal new/interval embolic infarctions\"\". Difficulty seeing pt prior to today due to uncontrolled HTN. Pt was originally admitted on 1/5/2024 with primary dx of SAH. Other active problems include HTN emergency, end state kidney disease on dialysis, anemia of chronic kidney dz, T1DM. PMH includes LLUVIA, anexiety, CKD, depression, diabetes mellitus, ESRD, HLD, HTN, hypoglycemia, obesity, orthostatic hypotension, peripheral polyneuropathy, retinopathy, seizures, skin cancer, stroke with residual balance impairments. Upon re-evaluation, body system limitations include: significant RUE hemiparesis, R sided sensory impairments, impaired balance and endurance, impaired cognition. Activity limitations include inability to complete ADLS, impaired mobility and activity tolerance. Participation restrictions include inability to return to home or community safely or perform leisure activities at current time. Pt will continue to benefit from skilled PT during hospital stay to address above impairments and promote a safe return to home. The patient's AM-PAC Basic Mobility Inpatient Short Form Raw Score is 17. A Raw score of greater than 16 suggests the patient may benefit from discharge to home. Please also refer to the recommendation of the Physical Therapist for safe discharge planning. However, pt functioning well below his baseline at current time.   Barriers to Discharge Decreased caregiver support;Inaccessible home environment   Goals   Patient Goals to improve   STG Expiration Date 01/30/24  (goals remain appropriate from IE, goal " date extended)   PT Treatment Day 3   Plan   Treatment/Interventions Functional transfer training;LE strengthening/ROM;Therapeutic exercise;Endurance training;Cognitive reorientation;Patient/family training;Equipment eval/education;Bed mobility;Gait training;Spoke to nursing;Spoke to case management;OT;Family;Compensatory technique education;Continued evaluation;Elevations   PT Frequency 3-5x/wk   Discharge Recommendation   Rehab Resource Intensity Level, PT (S)  I (Maximum Resource Intensity)   Additional Comments ANKIT sanabria updated on change in status and dc recommendation. CM also made aware of mothers question regarding insurance coverage if pt is to want to leave rehab facility prior to end of treatment.   AM-PAC Basic Mobility Inpatient   Turning in Flat Bed Without Bedrails 3   Lying on Back to Sitting on Edge of Flat Bed Without Bedrails 3   Moving Bed to Chair 3   Standing Up From Chair Using Arms 3   Walk in Room 3   Climb 3-5 Stairs With Railing 2   Basic Mobility Inpatient Raw Score 17   Basic Mobility Standardized Score 39.67   Highest Level Of Mobility   -[facility] Goal 5: Stand one or more mins   -HLM Achieved 6: Walk 10 steps or more   Modified Radha Scale   Modified Radha Scale 4   Barthel Index   Feeding 5   Bathing 0   Grooming Score 0   Dressing Score 5   Bladder Score 10   Bowels Score 10   Toilet Use Score 5   Transfers (Bed/Chair) Score 10   Mobility (Level Surface) Score 0   Stairs Score 0   Barthel Index Score 45   Elliot Mclaughlin, PT, DPT

## 2024-01-16 NOTE — ASSESSMENT & PLAN NOTE
Lab Results   Component Value Date    EGFR 7 01/16/2024    EGFR 4 01/15/2024    EGFR 6 01/14/2024    CREATININE 7.91 (H) 01/16/2024    CREATININE 11.46 (H) 01/15/2024    CREATININE 9.56 (H) 01/14/2024     On HD (MWF)

## 2024-01-16 NOTE — PLAN OF CARE
"  Problem: PHYSICAL THERAPY ADULT  Goal: Performs mobility at highest level of function for planned discharge setting.  See evaluation for individualized goals.  Description: Treatment/Interventions: Functional transfer training, LE strengthening/ROM, Therapeutic exercise, Endurance training, Elevations, Cognitive reorientation, Patient/family training, Equipment eval/education, Bed mobility, Gait training, Spoke to nursing, Spoke to case management, OT, Family          See flowsheet documentation for full assessment, interventions and recommendations.  Outcome: Progressing  Note: Prognosis: Fair  Problem List: Decreased strength, Impaired balance, Decreased endurance, Decreased mobility, Decreased coordination, Decreased cognition, Impaired sensation  Assessment: Pt seen for PT re-evaluation due to new onset RUE weakness and mild facial droop 1/12-13. MRI revealed \"MRI brain \"Crescendo multifocal acute/recent infarctions involving at least 3 discrete vascular territories (left posterior cerebral artery and bilateral middle cerebral artery territories), worrisome for multifocal new/interval embolic infarctions\"\". Difficulty seeing pt prior to today due to uncontrolled HTN. Pt was originally admitted on 1/5/2024 with primary dx of SAH. Other active problems include HTN emergency, end state kidney disease on dialysis, anemia of chronic kidney dz, T1DM. PMH includes LLUVIA, anexiety, CKD, depression, diabetes mellitus, ESRD, HLD, HTN, hypoglycemia, obesity, orthostatic hypotension, peripheral polyneuropathy, retinopathy, seizures, skin cancer, stroke with residual balance impairments. Upon re-evaluation, body system limitations include: significant RUE hemiparesis, R sided sensory impairments, impaired balance and endurance, impaired cognition. Activity limitations include inability to complete ADLS, impaired mobility and activity tolerance. Participation restrictions include inability to return to home or community " safely or perform leisure activities at current time. Pt will continue to benefit from skilled PT during hospital stay to address above impairments and promote a safe return to home. The patient's AM-PAC Basic Mobility Inpatient Short Form Raw Score is 17. A Raw score of greater than 16 suggests the patient may benefit from discharge to home. Please also refer to the recommendation of the Physical Therapist for safe discharge planning. However, pt functioning well below his baseline at current time.  Barriers to Discharge: Decreased caregiver support, Inaccessible home environment     Rehab Resource Intensity Level, PT: (S) I (Maximum Resource Intensity)    See flowsheet documentation for full assessment.

## 2024-01-16 NOTE — RESTORATIVE TECHNICIAN NOTE
Restorative Technician Note      Patient Name: Mateus Mckeon     Note Type: Mobility  Patient Position Upon Consult: Seated edge of bed  Activity Performed: Ambulated; Dangled; Stood  Assistive Device: Other (Comment) (Assisted OT Kristine with getting pt OOB to the chair/back to bed.)  Education Provided: Yes  Patient Position at End of Consult: Supine; All needs within reach; Bed/Chair alarm activated    Savi YIN, Restorative Technician,

## 2024-01-16 NOTE — ASSESSMENT & PLAN NOTE
Right basilar cistern SAH of unclear etiology  BD 17, HH 2, MF 3  S/p Cerebral angiography x 2 - both negative (Dr. Mittal, 1/5/2024, 1/12/2024)  Presented on 1/4/2024 with MONTILLA that started on 12/31/23  Hx ASA use for hx multiple strokes, reversed with DDAVP on arrival  MRIs negative for source of SAH; small scattered acute/subacute infarcts noted.   Pt on hemodialysis for CKD.   1/12-1/13 started with new hand weakness and facial droop - multiple new areas of embolic infarcts to left PCA and bilateral MCA.    Imaging:  MRI brain wo, 1/14/2023: 1. Crescendo multifocal acute/recent infarctions involving at least 3 discrete vascular territories (left posterior cerebral artery and bilateral middle cerebral artery territories), worrisome for multifocal new/interval embolic infarctions. Differential diagnosis could include watershed infarctions in the appropriate clinical setting. These recent/acute infarctions are new/superimposed in the setting of previously present smaller foci of diffusion restriction, indicative of progressive/new interval infarctions since 1/8/2024 2. 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 No significant mass effect. 3. White matter changes likely correlate to areas of recent infarction. 4. Right mastoid air cell effusion. Mastoiditis not excluded.    Plan:  Continue to monitor neuro exam closely.    GCS 15 with RUE weakness and ataxia, right facial weakness.  STAT CTA with decline in GCS > 2 pts in 1 hour.  Maintain SBP < 160 mmHg.  Reuiring multiple Hypertensive medications and started nitro gtt overnight  Pt with hx of strokes. Continue ASA 81mg.   Mobilize as tolerated.  Disposition: requesting ARC secondary to new strokes.   PM&R consult appreciated. Needs to be able to completed 3h of therapy a day, currently limited 2/2 BP control  DVT ppx: SCD's, can resume dvt ppx at this time from nsx  standpoint    Neurosurgery will continue to follow.   Will plan for follow up in 6 weeks time with repeat MRI brain w wo contrast and MRA head wo contrast Call with questions.

## 2024-01-16 NOTE — OCCUPATIONAL THERAPY NOTE
Occupational Therapy Re-Evaluation     Patient Name: Mateus Mckeon  Today's Date: 1/16/2024  Problem List  Principal Problem:    Acute CVA (cerebrovascular accident) (HCC)  Active Problems:    Type 1 diabetes mellitus (HCC)    Hyperphosphatemia    Anemia in chronic kidney disease, on chronic dialysis (HCC)    End stage kidney disease (HCC)    Subarachnoid hemorrhage (HCC)    Hypertensive emergency    Past Medical History  Past Medical History:   Diagnosis Date    Acute kidney injury (HCC)     Ambulates with cane     Anuria     Anxiety     Cellulitis of right elbow 03/31/2021    Chronic kidney disease     Depression     Diabetes mellitus (HCC)     Diarrhea     Emesis 10/24/2020    End stage renal disease (HCC) 02/11/2018    Formatting of this note might be different from the original. Last Assessment & Plan:  Secondary to DM.  On nightly PD.  Followed by Nephro.  [facility] Formatting of this note might be different from the original. Last Assessment & Plan:  Formatting of this note might be different from the original. 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    Esophagitis 07/21/2015    Falls     Gastroparesis     GERD (gastroesophageal reflux disease)     History of shingles 2010    History of transfusion 02/2018    no adverse reaction    Hyperlipidemia     Hyperphosphatemia     Hypertension     Hypoglycemia 07/15/2022    Itching     Mastoiditis of right side 07/15/2022    Muscle weakness     general unsteadiness    Obesity (BMI 30.0-34.9) 09/09/2019    Orthostatic hypotension 10/25/2020    Peripheral polyneuropathy 11/20/2019    PONV (postoperative nausea and vomiting) 01/26/2018    Protein-calorie malnutrition (HCC) 11/23/2020    Recurrent peritonitis (HCC) due to peritoneal dialysis catheter 07/31/2020    Retinopathy     Seizures (HCC)     early 2020 - one time    Skin abnormality     some dime size areas  where skin was scratched from itching    Spontaneous bacterial peritonitis (HCC) 10/19/2020    Squamous cell skin cancer     left temple    Stroke (HCC)     x2 - off balance/no driving/fatigue    Swelling of both lower extremities     Traumatic onycholysis 07/21/2022    Vomiting     Wears glasses      Past Surgical History  Past Surgical History:   Procedure Laterality Date    CARDIAC ELECTROPHYSIOLOGY PROCEDURE N/A 9/21/2023    Procedure: Cardiac loop recorder explant;  Surgeon: Parish Morgan MD;  [facility]: [facility];  Service: Cardiology    CARDIAC LOOP RECORDER  05/2018    COLONOSCOPY      EGD      EYE SURGERY Right     IR AV FISTULAGRAM/GRAFTOGRAM  02/23/2021    IR CEREBRAL ANGIOGRAPHY  1/12/2024    IR CEREBRAL ANGIOGRAPHY / INTERVENTION  1/5/2024    IR TUNNELED CENTRAL LINE PLACEMENT  02/16/2021    IR TUNNELED DIALYSIS CATHETER PLACEMENT  11/18/2020    IR TUNNELED DIALYSIS CATHETER REMOVAL  02/12/2021    IR TUNNELED DIALYSIS CATHETER REMOVAL  03/11/2021    MOHS SURGERY Left 12/14/2022    Left temple with Dr. Hassan    PERITONEAL CATHETER INSERTION N/A 08/27/2018    Procedure: UNROOF PD CATHETER;  Surgeon: Felipe Lindo DO;  Location: [facility];  Service: General    LA ARTERIOVENOUS ANASTOMOSIS OPEN DIRECT Left 11/09/2020    Procedure: CREATION FISTULA  ARTERIOVENOUS (AV) - LEFT WRIST;  Surgeon: Placido Altamirano MD;  Location: [facility];  Service: Vascular    LA ESOPHAGOGASTRODUODENOSCOPY TRANSORAL DIAGNOSTIC N/A 04/18/2019    Procedure: ESOPHAGOGASTRODUODENOSCOPY (EGD);  Surgeon: Ale Figueroa MD;  Location: [facility];  Service: Gastroenterology    LA LAPS INSERTION TUNNELED INTRAPERITONEAL CATHETER N/A 08/06/2018    Procedure: LAPAROSCOPIC PD CATHETER PLACEMENT;  Surgeon: Felipe Lindo DO;  Location[facility];  Service: General    LA REMOVAL TUNNELED INTRAPERITONEAL CATHETER N/A 11/18/2020    Procedure: REMOVAL CATHETER PERITONEAL DIALYSIS;  Surgeon: Abdifatah Ty MD;  [facility];   Service: General    TONSILLECTOMY      UPPER GASTROINTESTINAL ENDOSCOPY           01/16/24 1210   OT Last Visit   OT Visit Date 01/16/24   Note Type   Note type Re-Evaluation   Pain Assessment   Pain Assessment Tool 0-10   Pain Score No Pain   Restrictions/Precautions   Weight Bearing Precautions Per Order No   Other Precautions Cognitive;Chair Alarm;Bed Alarm;Fall Risk;Telemetry;Multiple lines;Seizure  (+Right Hemiparesis, Right UE sensory deficits)   Home Living   Type of Home House   Home Layout Two level;Stairs to enter with rails   Bathroom Shower/Tub Tub/shower unit   Bathroom Toilet Standard   Bathroom Equipment Shower chair   Home Equipment Cane;Other (Comment)  (SPC for community,+rollator)   Prior Function   Level of Love Independent with functional mobility;Independent with ADLs;Needs assistance with IADLS   Lives With Family -mother   Receives Help From Family   IADLs Independent with meal prep;Independent with medication management;Family/Friend/Other provides transportation   Falls in the last 6 months 1 to 4   Vocational Unemployed   Lifestyle   Autonomy Pt reports being I with ADLs and receivies assistance with IADLs.Pt uses [facility] [place] [facility] for community mobility   Reciprocal Relationships Pt lives with parents- both retired   Service to Others Pt is unemployed   Intrinsic Gratification Pt enjoys relaxing   General   Family/Caregiver Present No   ADL   LB Dressing Assistance 3  Moderate Assistance   LB Dressing Deficit (mod a s/p education on one handed compensatory strategy total assist right, S/set-up left sock.)   Bed Mobility   Supine to Sit 4  Minimal assistance   Additional items Assist x 1   Sit to Supine Unable to assess   Transfers   Sit to Stand 3  Moderate assistance   Additional items Assist x 2;Increased time required;Verbal cues   Stand to Sit 3  Moderate assistance   Additional items Assist x 2   Stand pivot 4  Minimal assistance   Additional items Assist x 2  (min a x 2 from bed >chair  with cues for safety.)   Additional Comments +B/L HHA and right UE supported with protection stratgey, right knee blocked   Functional Mobility   Additional Comments unable to assess   Balance   Static Sitting Fair -   Dynamic Sitting Poor +   Static Standing Poor   Dynamic Standing Poor   Ambulatory Poor   Activity Tolerance   Activity Tolerance Patient limited by fatigue   Medical Staff Made Aware PCA, restorative Krystyn   Nurse Made Aware Rn cleared pt for therapy   RUE Assessment   RUE Assessment X  (Right shoulder and elbow strength 2/5, wrist/forearm 0/5, fingers 2-/5. Right hand dominant) pt performed SROM s/p education to all planes with left UE assistance RUE. 3-5x reps sitting EOB.   LUE Assessment   LUE Assessment WFL   Hand Function   Gross Motor Coordination Impaired  (right hand)   Fine Motor Coordination Impaired  (right hand)   Cognition   Overall Cognitive Status Impaired   Arousal/Participation Alert;Responsive   Attention Attends with cues to redirect   Orientation Level Oriented to person;Oriented to place;Oriented to time  (grossly)   Memory Decreased recall of precautions;Decreased recall of recent events   Following Commands Follows multistep commands with increased time or repetition   Comments pt presents with flat affect, initally agreeable/cooperative, irritable as session progressed with low frustration tolerance, decreased comprehension of safety awareness strategies with mobility. Education provided, charge RN notified and aware. (Offered pastoral care for supportive listening, pt declined)   Assessment   Limitation Decreased ADL status;Decreased UE ROM;Decreased Safe judgement during ADL;Decreased UE strength;Decreased cognition;Decreased endurance;Decreased self-care trans;Decreased high-level ADLs;Decreased fine motor control;Decreased sensation;Non-func R UE   Prognosis Fair   Assessment Pt seen for a re-evaluation 2* to new acute CVA. Pt is a 38 y.o. male who was [facility] on 1/5/2024 with SAH and now experiencing new onset RUE weakness and mild facial droop 1/12-13. MRI revealed \"MRI brain \"Crescendo multifocal acute/recent infarctions involving at least 3 discrete vascular territories  Acute CVA (cerebrovascular accident) (HCC) .see IE for social history. Currently pt requires max a  for overall ADLS and mod a x 2 for functional mobility/transfers. Pt currently presents with impairments in the following categories -steps to enter environment, difficulty performing ADLS, difficulty performing IADLS , limited insight into deficits, and decreased initiation and engagement  activity tolerance, endurance, standing balance/tolerance, sitting balance/tolerance, UE strength, UE ROM, FMC, GMC, safety , and judgement . These impairments, as well as pt's fatigue, (R) UE dysmetria, (R) hemiparesis, and risk for falls  limit pt's ability to safely engage in all baseline areas of occupation, includingeating, grooming, bathing, dressing, toileting, functional mobility/transfers, community mobility, laundry , driving, house maintenance, medication management, meal prep, cleaning, social participation , and leisure activities  The patient's raw score on the -PAC Daily Activity Inpatient Short Form is 14. A raw score of less than 19 suggests the patient may benefit from discharge to home. Please refer to the recommendation of the Occupational Therapist for safe discharge planning. From OT standpoint, recommend maximum level I upon D/C. OT will continue to follow to address the below stated goals.   Goals   Patient Goals get better   LTG Time Frame 10-14   Long Term Goal #1 see goals below   Plan   Treatment Interventions ADL retraining;Functional transfer training;UE strengthening/ROM;Endurance training;Cognitive reorientation;Patient/family training;Equipment evaluation/education;Neuromuscular reeducation;Fine motor coordination activities;Compensatory technique " education;Continued evaluation;Energy conservation;Activityengagement   Goal Expiration Date 01/30/24   OT Treatment Day 3   OT Frequency 3-5x/wk   Discharge Recommendation   Rehab Resource Intensity Level, OT I (Maximum Resource Intensity)   AM-PAC Daily Activity Inpatient   Lower Body Dressing 2   Bathing 2   Toileting 2   Upper Body Dressing 2   Grooming 3   Eating 3   Daily Activity Raw Score 14   Daily Activity Standardized Score (Calc for Raw Score >=11) 33.39   AM-PAC Applied Cognition Inpatient   Following a Speech/Presentation 2   Understanding Ordinary Conversation 4   Taking Medications 3   Remembering Where Things Are Placed or Put Away 3   Remembering List of 4-5 Errands 3   Taking Care of Complicated Tasks 2   Applied Cognition Raw Score 17   Applied Cognition Standardized Score 36.52   End of Consult   Education Provided Yes  (RUE SROM HEP, right UE protection strategies 2* to hemiplegia/increased risk for glenohumeral subluxation, one handed compensatory techniques and RUE proper positioning hand out issued and placed on white board for pt/family and staff reference.)   Patient Position at End of Consult Bedside chair  (left with restorative Krystyn)   Nurse Communication Nurse aware of consult   Treatment session: pt seen for an additional OT treatment session with focus on self care LB dressing tasks  SROM right UE HEP, pt education. Will continue to assess cognition and re-enforce SROM HEP. Continue to plan of care.    Occupational Therapy Goals:    *S with bed mobility to engage in functional tasks.  *Min a Adl's after setup with use of AE PRN  *Min a toileting and clothing management   *Min a functional mobility and transfers to/from all surfaces with Fair + dynamic balance and safety for participation in dynamic adls and iadl tasks   *Demonstrate good carryover with safe use of RW during functional tasks   *Assess DME needs   *Increase activity tolerance to 25-30 minutes for participation in  adls and enjoyable activities  *Pt to participate in further cognitive testing with good attention and participation to assist with safe d/c recommendations  *Demonstrate good carryover of pt/family education and training with good tolerance for increased safety and independence with ADL's/ADl's.  *Pt will improve standing balance to 2-3  minutes with functional tasks to increase I with toileting/transfers.  *Patient will demonstrate 100% carryover of energy conservation techniques t/o functional I/ADL/leisure tasks w/o cues s/p skilled education to increase endurance during functional tasks  *Pt will increase attention to follow 100% simple one step verbal commands and be A/Ox4 consistently t/o use of external environmental cues w/ mod I  Helga Daniel MOT, OTR/L

## 2024-01-16 NOTE — PLAN OF CARE
Problem: PAIN - ADULT  Goal: Verbalizes/displays adequate comfort level or baseline comfort level  Description: Interventions:  - Encourage patient to monitor pain and request assistance  - Assess pain using appropriate pain scale  - Administer analgesics based on type and severity of pain and evaluate response  - Implement non-pharmacological measures as appropriate and evaluate response  - Consider cultural and social influences on pain and pain management  - Notify physician/advanced practitioner if interventions unsuccessful or patient reports new pain  Outcome: Progressing     Problem: INFECTION - ADULT  Goal: Absence or prevention of progression during hospitalization  Description: INTERVENTIONS:  - Assess and monitor for signs and symptoms of infection  - Monitor lab/diagnostic results  - Monitor all insertion sites, i.e. indwelling lines, tubes, and drains  - Monitor endotracheal if appropriate and nasal secretions for changes in amount and color  - [place] appropriate cooling/warming therapies per order  - Administer medications as ordered  - Instruct and encourage patient and family to use good hand hygiene technique  - Identify and instruct in appropriate isolation precautions for identified infection/condition  Outcome: Progressing

## 2024-01-16 NOTE — PROGRESS NOTES
[facility]  Progress Note  Name: Mateus Mckeon I  MRN: [id number]  Unit/Bed#: PPHP 718-01 I Date of Admission: 1/5/2024   Date of Service: 1/16/2024 I Hospital Day: 11    Assessment/Plan   Subarachnoid hemorrhage (HCC)  Assessment & Plan  Right basilar cistern SAH of unclear etiology  BD 17, HH 2, MF 3  S/p Cerebral angiography x 2 - both negative (Dr. Mittal, 1/5/2024, 1/12/2024)  Presented on 1/4/2024 with MONTILLA that started on 12/31/23  Hx ASA use for hx multiple strokes, reversed with DDAVP on arrival  MRIs negative for source of SAH; small scattered acute/subacute infarcts noted.   Pt on hemodialysis for CKD.   1/12-1/13 started with new hand weakness and facial droop - multiple new areas of embolic infarcts to left PCA and bilateral MCA.    Imaging:  MRI brain wo, 1/14/2023: 1. Crescendo multifocal acute/recent infarctions involving at least 3 discrete vascular territories (left posterior cerebral artery and bilateral middle cerebral artery territories), worrisome for multifocal new/interval embolic infarctions. Differential diagnosis could include watershed infarctions in the appropriate clinical setting. These recent/acute infarctions are new/superimposed in the setting of previously present smaller foci of diffusion restriction, indicative of progressive/new interval infarctions since 1/8/2024 2. 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 No significant mass effect. 3. White matter changes likely correlate to areas of recent infarction. 4. Right mastoid air cell effusion. Mastoiditis not excluded.    Plan:  Continue to monitor neuro exam closely.    GCS 15 with RUE weakness and ataxia, right facial weakness.  STAT CTA with decline in GCS > 2 pts in 1 hour.  Maintain SBP < 160 mmHg.  Reuiring multiple Hypertensive medications and started nitro gtt overnight  Pt with  hx of strokes. Continue ASA 81mg.   Mobilize as tolerated.  Disposition: requesting ARC secondary to new strokes.   PM&R consult appreciated. Needs to be able to completed 3h of therapy a day, currently limited 2/2 BP control  DVT ppx: SCD's, can resume dvt ppx at this time from nsx standpoint    Neurosurgery will continue to follow.   Will plan for follow up in 6 weeks time with repeat MRI brain w wo contrast and MRA head wo contrast Call with questions.      Hypertensive emergency  Assessment & Plan  Per nephrology and medicine  Increased nifedipine 90mg PO BID (max dose), remainder regimen continued  Currently on nitro gtt  Goal SBP<160    End stage kidney disease (HCC)  Assessment & Plan  Lab Results   Component Value Date    EGFR 7 01/16/2024    EGFR 4 01/15/2024    EGFR 6 01/14/2024    CREATININE 7.91 (H) 01/16/2024    CREATININE 11.46 (H) 01/15/2024    CREATININE 9.56 (H) 01/14/2024     On HD (MWF)    Type 1 diabetes mellitus ([facility])  Assessment & Plan  Lab Results   Component Value Date    HGBA1C 6.0 (H) 01/14/2024       Recent Labs     01/15/24  1302 01/15/24  1602 01/16/24  0703 01/16/24  1200   POCGLU 128 181* 131 137         Blood Sugar Average: Last 72 hrs:  (P) 158    Continue management per primary team    * Acute CVA (cerebrovascular accident) (HCC)  Assessment & Plan  Neurology following  Started with new onset right hand weakness and right facial droop 1/12-1/13 concerning for new stroke  MRI brain confirms new multifocal infarcts  CTA head and neck without any acute findings  Asa has been resumed 1/12         Subjective/Objective   Chief Complaint: My arm and my face doesn't work    Subjective: Patient states poor sleep overnight. Continued with RUE weakness and right facial weakness. Poor BP control requiring starting nitro gtt overnight.     Objective: sleeping in bed. NAD    I/O         01/14 0701  01/15 0700 01/15 0701 01/16 0700 01/16 0701 01/17 0700    P.O. 440 240     I.V. (mL/kg)  470  "(4.9)     Total Intake(mL/kg) 440 (4.6) 710 (7.5)     Urine (mL/kg/hr)       Other  1659     Total Output  1659     Net +440 -949            Unmeasured Urine Occurrence 0 x              Invasive Devices       Peripheral Intravenous Line  Duration             Peripheral IV 01/13/24 Left;Ventral (anterior) Forearm 3 days              Line  Duration             Hemodialysis AV Fistula 11/09/20 Left Other (Comment) 1163 days                    Physical Exam:  Vitals: Blood pressure 160/77, pulse 70, temperature 97.6 °F (36.4 °C), temperature source Oral, resp. rate 16, height 5' 5\" (1.651 m), weight 95.1 kg (209 lb 10.5 oz), SpO2 99%.,Body mass index is 34.89 kg/m².    General appearance: alert, appears stated age, cooperative and no distress  Head: Normocephalic, without obvious abnormality  Eyes: EOMI, conjugate gaze  Neck: supple, symmetrical, trachea midline   Lungs: non labored breathing  Heart: regular heart rate  Neurologic:   Mental status: Alert, oriented, thought content appropriate  Cranial nerves: grossly intact (Cranial nerves II-XII) except central right facial weakness  Sensory: normal to crude touch  Motor: moving all extremities with right wrist and  weakness. Good resistance with bicep/tricep/delt    Lab Results:  Results from last 7 days   Lab Units 01/16/24  0525 01/15/24  0904 01/14/24  0604 01/13/24  0548 01/12/24  0623 01/10/24  0557   WBC Thousand/uL 7.73 8.66 9.53 7.83   < > 6.49   HEMOGLOBIN g/dL 8.4* 8.9* 9.7* 9.5*   < > 8.2*   HEMATOCRIT % 25.3* 27.0* 28.6* 28.5*   < > 24.9*   PLATELETS Thousands/uL 260 254 257 273   < > 192   NEUTROS PCT %  --  67  --  69  --  76*   MONOS PCT %  --  8  --  7  --  9   EOS PCT %  --  7*  --  6  --  0    < > = values in this interval not displayed.     Results from last 7 days   Lab Units 01/16/24  0525 01/15/24  0904 01/14/24  0604 01/12/24  0623 01/11/24  0522   SODIUM mmol/L 138 132* 136   < > 138   POTASSIUM mmol/L 4.7 5.1 4.5   < > 4.1   CHLORIDE " "mmol/L 101 94* 97   < > 99   CO2 mmol/L 28 25 23   < > 28   BUN mg/dL 28* 52* 39*   < > 25   CREATININE mg/dL 7.91* 11.46* 9.56*   < > 6.69*   CALCIUM mg/dL 8.0* 7.9* 7.9*   < > 8.2*   ALK PHOS U/L  --   --   --   --  64   ALT U/L  --   --   --   --  9   AST U/L  --   --   --   --  13    < > = values in this interval not displayed.     Results from last 7 days   Lab Units 01/10/24  0557   MAGNESIUM mg/dL 2.2     Results from last 7 days   Lab Units 01/10/24  0557   PHOSPHORUS mg/dL 5.7*     Results from last 7 days   Lab Units 01/12/24  0623   INR  1.05   PTT seconds 30     No results found for: \"TROPONINT\"  ABG:  Lab Results   Component Value Date    PHART 7.454 (H) 02/21/2020    GVV9BHN 33.2 (L) 02/21/2020    PO2ART 170.0 (H) 02/21/2020    RKS0FUK 22.8 02/21/2020    BEART -0.8 02/21/2020    SOURCE Radial, Right 02/21/2020       Imaging Studies: I have personally reviewed pertinent reports.   and I have personally reviewed pertinent films in PACS    CT head wo contrast    Result Date: 1/9/2024  Impression: Improving subarachnoid hemorrhage compared to recent prior studies. No new findings. Other stable and nonemergent findings above. Workstation performed: [id number]     MRI brain w wo contrast    Result Date: 1/8/2024  Impression: Stable hematoma in the right CP angle, prepontine and premedullary cisterns. New subarachnoid hemorrhage over the convexities and within the lateral ventricles likely due to redistribution. Few tiny scattered recent infarcts in multiple vascular distributions. Study marked in epic for notification. Workstation performed: [id number]     MRI cervical spine w wo contrast    Result Date: 1/8/2024  Impression: No identifiable etiology for subarachnoid hemorrhage. Mild congenital canal stenosis. New marrow edema at C5-C6 with severe disc space narrowing that is similar to recent CT. Modic type I endplate degenerative changes favored over infection but recommend clinical correlation and follow-up " imaging if warranted. Study marked in [place] for notification. Workstation performed: [id number]     CTA head and neck with and without contrast    Result Date: 1/5/2024  Impression: 1.  Small amount of subarachnoid hemorrhage in the right basilar cistern region is again seen without significant change since the prior CT brain exam of the same day. No enhancing brain mass/fluid collection or evidence of vascular malformation. Major intracranial dural venous sinuses are grossly patent. 2.  Mild scattered calcific atherosclerosis of the carotid bifurcation and cavernous carotid segments bilaterally without significant stenosis. Bilateral major cervical/intracranial arteries are patent with normal course and caliber. No arterial occlusion, significant flow-limiting stenosis, or focal saccular aneurysm identified 3.  Nonspecific 4 mm right upper lobe lung nodule is seen. No routine follow-up imaging recommended per current Fleischner Society guidelines for low risk patient. 4.  Opacification of the right mastoid air cells could be secondary to mastoid effusion or mastoiditis, recommend clinical correlation with ENT exam findings. Left mastoid air cells and paranasal sinuses appear well aerated and clear. Workstation performed: [id number]     CT head without contrast    Result Date: 1/5/2024  Impression: 1.  Right prepontine/premedullary cistern subarachnoid hemorrhage. Minimal mass effect on the right middle cerebellar peduncle. Short-term follow-up is recommended. 2.  Opacification of the right mastoid air cells correlate clinically for mastoiditis. I personally discussed this study with REYNALDO MULLER on 1/5/2024 3:43 AM. Workstation performed: [id number]       VTE Mechanical Prophylaxis: sequential compression device

## 2024-01-16 NOTE — ASSESSMENT & PLAN NOTE
Lab Results   Component Value Date    HGBA1C 6.0 (H) 01/14/2024       Recent Labs     01/15/24  0845 01/15/24  1302 01/15/24  1602 01/16/24  0703   POCGLU 187* 128 181* 131     Managed with insulin pump.    Blood Sugar Average: Last 72 hrs:  (P) 159.3125    Blood glucose very well controlled  Continue using pump  Continue to monitor

## 2024-01-16 NOTE — PLAN OF CARE
"  Problem: OCCUPATIONAL THERAPY ADULT  Goal: Performs self-care activities at highest level of function for planned discharge setting.  See evaluation for individualized goals.  Description: Treatment Interventions: ADL retraining, Functional transfer training, UE strengthening/ROM, Endurance training, Equipment evaluation/education, Compensatory technique education, Continued evaluation, Energy conservation, Activityengagement          See flowsheet documentation for full assessment, interventions and recommendations.   Note: Limitation: Decreased ADL status, Decreased UE ROM, Decreased Safe judgement during ADL, Decreased UE strength, Decreased cognition, Decreased endurance, Decreased self-care trans, Decreased high-level ADLs, Decreased fine motor control, Decreased sensation, Non-func R UE  Prognosis: Fair  Assessment: Pt seen for a re-evaluation 2* to new acute CVA. Pt is a 38 y.o. male who was admitted to [facility] on 1/5/2024 with SAH and now experiencing new onset RUE weakness and mild facial droop 1/12-13. MRI revealed \"MRI brain \"Crescendo multifocal acute/recent infarctions involving at least 3 discrete vascular territories  Acute CVA (cerebrovascular accident) (HCC) .see IE for social history. Currently pt requires max a  for overall ADLS and mod a x 2 for functional mobility/transfers. Pt currently presents with impairments in the following categories -steps to enter environment, difficulty performing ADLS, difficulty performing IADLS , limited insight into deficits, and decreased initiation and engagement  activity tolerance, endurance, standing balance/tolerance, sitting balance/tolerance, UE strength, UE ROM, FMC, GMC, safety , and judgement . These impairments, as well as pt's fatigue, (R) UE dysmetria, (R) hemiparesis, and risk for falls  limit pt's ability to safely engage in all baseline areas of occupation, includingeating, grooming, bathing, dressing, toileting, functional " mobility/transfers, community mobility, laundry , driving, house maintenance, medication management, meal prep, cleaning, social participation , and leisure activities  The patient's raw score on the -PAC Daily Activity Inpatient Short Form is 14. A raw score of less than 19 suggests the patient may benefit from discharge to home. Please refer to the recommendation of the Occupational Therapist for safe discharge planning. From OT standpoint, recommend maximum level I upon D/C. OT will continue to follow to address the below stated goals.     Rehab Resource Intensity Level, OT: I (Maximum Resource Intensity)

## 2024-01-16 NOTE — PROGRESS NOTES
SLIM notified about patients blood pressure being 200/103 despite both lines of prn blood pressure meds given. SLIM to reach out to critical care about possibly starting a Cardene drip. No new orders at this time, will continue to monitor.

## 2024-01-16 NOTE — PROGRESS NOTES
NEPHROLOGY PROGRESS NOTE   Mateus Mckeon 38 y.o. male MRN: [id number]  Unit/Bed#: PPHP 718-01 Encounter: [id number]  Reason for Consult: Management of ESRD    ASSESSMENT AND PLAN:  37 yo man with PMH of ESRD on HD at [facility] on, MWF p/w headaches.  Nephrology is consulted for management of ESRD     PLAN:     #ESRD on HD MWF:  Dialysis unit/days: [facility]  Access: AV fistula  Dialysis yesterday, well-tolerated. UF 1.1L  Plan to continue HD as per schedule  Renal Diet  Fluid restriction 1-1.5L/d  Adjust medications to GFR<10  Avoid opioids      #Volume status/hypertension:  Volume: Hypervolemic  Blood pressure: Hypertensive, /77  Clonidine switch to tid , patch dc  Hydralazine 100 3 times daily  Labetalol 800mg 3 times daily  Lisinopril 40 mg  Increase Nifedipine to 90mg bid , max dose   Started on nitro overnight  Low-sodium   UF on HD     #Secondary Hyperparasitoidism   Cinacalcet 60mg   PhosLo with meals     # Anemia of Kidney Disease  Current hemoglobin: 8.4 mg/dL  Treatment:  Transfuse for hemoglobin less than 7.0 per primary service          # Subarachnoid hemorrhage  Management as per neurosurgery  Stable        #DM  HbA1c 6  Advised to maintain a good DM control to prevent progression of CKD   Maintain healthy diet (vegetables, fruits, whole grains, nonfat or low fat)  Weight loss  Physical activity (5 to 10 minutes to start the increase to 30 min a day)       SUBJECTIVE:  Patient seen and examined at bedside. No chest pain, shortness of breath.  Patient with BP difficult to control    OBJECTIVE:  Current Weight: Weight - Scale: 95.1 kg (209 lb 10.5 oz)  Vitals:    01/16/24 1019   BP: 160/77   Pulse: 70   Resp:    Temp:    SpO2:        Intake/Output Summary (Last 24 hours) at 1/16/2024 1034  Last data filed at 1/15/2024 1218  Gross per 24 hour   Intake 270 ml   Output 1659 ml   Net -1389 ml     Wt Readings from Last 3 Encounters:   01/15/24 95.1 kg (209 lb 10.5 oz)   01/13/24 94.8 kg (209 lb)    01/04/24 95.3 kg (210 lb 1.6 oz)     Temp Readings from Last 3 Encounters:   01/16/24 97.6 °F (36.4 °C) (Oral)   01/04/24 97.8 °F (36.6 °C) (Oral)   01/03/24 97.8 °F (36.6 °C) (Oral)     BP Readings from Last 3 Encounters:   01/16/24 160/77   01/05/24 (!) 194/97   01/03/24 143/69     Pulse Readings from Last 3 Encounters:   01/16/24 70   01/05/24 80   01/03/24 72        General:  no acute distress at this time  Skin:  No acute rash  Eyes:  No scleral icterus and noninjected  ENT:  mucous membranes moist  Neck:  no carotid bruits  Chest:  Clear to auscultation percussion, good respiratory effort, no use of accessory respiratory muscles  CVS:  Regular rate and rhythm without rub   Abdomen:  soft and nontender   Extremities: lower extremity edema  Neuro:  No gross focality  Psych:  Alert , cooperative   vascular access : AV fistula      Medications:    Current Facility-Administered Medications:     aspirin chewable tablet 81 mg, 81 mg, Oral, Daily, Malu Moore PA-C, 81 mg at 01/16/24 0826    atorvastatin (LIPITOR) tablet 40 mg, 40 mg, Oral, QPM, Radha Steen MD, 40 mg at 01/15/24 1846    bisacodyl (DULCOLAX) rectal suppository 10 mg, 10 mg, Rectal, Daily PRN, Radha Steen MD    calcium acetate (PHOSLO) capsule 667 mg, 667 mg, Oral, TID With Meals, Radha Steen MD, 667 mg at 01/16/24 0827    cinacalcet (SENSIPAR) tablet 60 mg, 60 mg, Oral, Daily, Radha Steen MD, 60 mg at 01/16/24 0827    cloNIDine (CATAPRES) tablet 0.3 mg, 0.3 mg, Oral, Q8H [facility], Valeria Forrester PA-C, 0.3 mg at 01/16/24 0525    escitalopram (LEXAPRO) tablet 20 mg, 20 mg, Oral, Daily, Radha Steen MD, 20 mg at 01/16/24 0827    famotidine (PEPCID) tablet 40 mg, 40 mg, Oral, Daily, Radha Steen MD, 40 mg at 01/16/24 0827    gabapentin (NEURONTIN) capsule 300 mg, 300 mg, Oral, Daily, Radha Steen MD, 300 mg at 01/15/24 1320    hydrALAZINE (APRESOLINE) injection 20 mg, 20 mg, Intravenous, Q4H PRN, Court Edwards MD, 20 mg at 01/15/24 2305    hydrALAZINE  (APRESOLINE) tablet 100 mg, 100 mg, Oral, TID, Quinten Henson MD, 100 mg at 01/16/24 0826    insulin lispro (HumaLOG) FOR PUMP REFILLS 300 Units, 300 Units, Subcutaneous Insulin Pump, Daily PRN, Radha Steen MD    labetalol (NORMODYNE) injection 20 mg, 20 mg, Intravenous, Q4H PRN, Valeria Forrester PA-C, 20 mg at 01/15/24 2203    labetalol (NORMODYNE) tablet 800 mg, 800 mg, Oral, TID, Radha Steen MD, 800 mg at 01/16/24 0827    lisinopril (ZESTRIL) tablet 40 mg, 40 mg, Oral, Daily, Quinten Henson MD, 40 mg at 01/16/24 0827    melatonin tablet 6 mg, 6 mg, Oral, HS PRN, Cinthia Dempsey MD    NIFEdipine (PROCARDIA XL) 24 hr tablet 60 mg, 60 mg, Oral, QAM, Mason Herr MD, 60 mg at 01/16/24 0826    NIFEdipine (PROCARDIA XL) 24 hr tablet 90 mg, 90 mg, Oral, HS, Mason Herr MD, 90 mg at 01/15/24 2021    nitroGLYcerin (TRIDIL) 50 mg in 250 mL infusion (premix), 5-200 mcg/min, Intravenous, Titrated, Valeria Forrester PA-C, Last Rate: 42 mL/hr at 01/16/24 0831, 140 mcg/min at 01/16/24 0831    ondansetron (ZOFRAN) injection 4 mg, 4 mg, Intravenous, Q4H PRN, Radha Steen MD, 4 mg at 01/14/24 1351    oxyCODONE (ROXICODONE) split tablet 2.5 mg, 2.5 mg, Oral, Q4H PRN, 2.5 mg at 01/11/24 2001 **OR** oxyCODONE (ROXICODONE) IR tablet 5 mg, 5 mg, Oral, Q4H PRN, Radha Steen MD, 5 mg at 01/16/24 0528    PATIENT MAINTAINED INSULIN PUMP 1 each, 1 each, Subcutaneous, Q8H, Radha Steen MD, 1 each at 01/16/24 0828    polyethylene glycol (MIRALAX) packet 17 g, 17 g, Oral, Daily, Radha Steen MD, 17 g at 01/14/24 0822    prochlorperazine (COMPAZINE) tablet 5 mg, 5 mg, Oral, Q6H PRN, Radha Steen MD, 5 mg at 01/06/24 1217    senna-docusate sodium (SENOKOT S) 8.6-50 mg per tablet 1 tablet, 1 tablet, Oral, BID, Radha Steen MD, 1 tablet at 01/16/24 0826    Laboratory Results:  Results from last 7 days   Lab Units 01/16/24  0525 01/15/24  0904 01/14/24  0604 01/13/24  0548 01/12/24  0623 01/11/24  0522 01/10/24  0557   WBC Thousand/uL 7.73 8.66 9.53 7.83  7.25  --  6.49   HEMOGLOBIN g/dL 8.4* 8.9* 9.7* 9.5* 9.4*  --  8.2*   HEMATOCRIT % 25.3* 27.0* 28.6* 28.5* 28.4*  --  24.9*   PLATELETS Thousands/uL 260 254 257 273 260  --  192   SODIUM mmol/L 138 132* 136 138 136 138 133*   POTASSIUM mmol/L 4.7 5.1 4.5 4.3 4.4 4.1 4.4   CHLORIDE mmol/L 101 94* 97 98 97 99 92*   CO2 mmol/L 28 25 23 27 26 28 23   BUN mg/dL 28* 52* 39* 29* 37* 25 37*   CREATININE mg/dL 7.91* 11.46* 9.56* 7.07* 8.79* 6.69* 8.44*   CALCIUM mg/dL 8.0* 7.9* 7.9* 7.7* 8.3* 8.2* 8.6   MAGNESIUM mg/dL  --   --   --   --   --   --  2.2   PHOSPHORUS mg/dL  --   --   --   --   --   --  5.7*       MRI brain wo contrast   Final Result by Willis Hudldeston MD (01/14 1311)         1. Crescendo multifocal acute/recent infarctions involving at least 3 discrete vascular territories (left posterior cerebral artery and bilateral middle cerebral artery territories), worrisome for multifocal new/interval embolic infarctions. Differential    diagnosis could include watershed infarctions in the appropriate clinical setting. These recent/acute infarctions are new/superimposed in the setting of previously present smaller foci of diffusion restriction, indicative of progressive/new interval    infarctions since 1/8/2024   2. 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 No significant mass effect.   3. White matter changes likely correlate to areas of recent infarction.   4. Right mastoid air cell effusion. Mastoiditis not excluded.      Workstation performed: [id number]         CTA head and neck w wo contrast   Final Result by Justin Ho MD (01/13 9717)      CT brain:  Improved scattered subarachnoid hemorrhages (better delineated on the recent prior MRI examination) with no new intra or extra-axial hemorrhage.      CTA head: Negative for large vessel intracranial occlusion or hemodynamically significant stenosis.       CTA neck:  No extracranial carotid stenosis.  The cervical vertebral arteries are patent.         Workstation performed: [id number]         VAS transcranial doppler, complete study   Final Result by Dayne Burden DO (01/13 1147)      IR cerebral angiography   Final Result by Yanira Moss (01/15 1046)      VAS transcranial doppler, complete study   Final Result by Ashley Poe MD (01/11 1121)      VAS transcranial doppler, complete study   Final Result by Ashley Poe MD (01/10 1824)      CT head wo contrast   Final Result by Makenzie Calvin MD (01/09 1453)      Improving subarachnoid hemorrhage compared to recent prior studies. No new findings.      Other stable and nonemergent findings above.         Workstation performed: [id number]         VAS transcranial doppler, complete study   Final Result by Dennis Paul DO (01/09 1147)      VAS transcranial doppler, complete study   Final Result by Dennis Paul DO (01/08 1614)      MRI brain w wo contrast   Final Result by E. Alec Schoenberger, MD (01/08 0859)      Stable hematoma in the right CP angle, prepontine and premedullary cisterns. New subarachnoid hemorrhage over the convexities and within the lateral ventricles likely due to redistribution.   Few tiny scattered recent infarcts in multiple vascular distributions.      Study marked in epic for notification.      Workstation performed: [id number]         MRI cervical spine w wo contrast   Final Result by E. Alec Schoenberger, MD (01/08 0858)      No identifiable etiology for subarachnoid hemorrhage.   Mild congenital canal stenosis.   New marrow edema at C5-C6 with severe disc space narrowing that is similar to recent CT. Modic type I endplate degenerative changes favored over infection but recommend clinical correlation and follow-up imaging if warranted.      Study marked in epic for notification.      Workstation performed: [id number]         VAS transcranial doppler, complete study   Final Result  "by Priscila Chavez MD (01/07 1736)      VAS transcranial doppler, complete study   Final Result by Priscila Chavez MD (01/07 1736)      IR cerebral angiography / intervention   Final Result by Yanira Moss (01/08 1407)      VAS transcranial doppler, complete study   Final Result by Dayne Burden DO (01/05 1442)      MRA head wo contrast    (Results Pending)   MRI brain w wo contrast    (Results Pending)   VAS renal artery complete    (Results Pending)   VAS transcranial doppler, complete study    (Results Pending)       Portions of the record may have been created with voice recognition software. Occasional wrong word or \"sound a like\" substitutions may have occurred due to the inherent limitations of voice recognition software. Read the chart carefully and recognize, using context, where substitutions have occurred.    "

## 2024-01-16 NOTE — ASSESSMENT & PLAN NOTE
Lab Results   Component Value Date    EGFR 7 01/16/2024    EGFR 4 01/15/2024    EGFR 6 01/14/2024    CREATININE 7.91 (H) 01/16/2024    CREATININE 11.46 (H) 01/15/2024    CREATININE 9.56 (H) 01/14/2024   hgb is stable  Continue to monitor

## 2024-01-16 NOTE — ASSESSMENT & PLAN NOTE
Per nephrology and medicine  Increased nifedipine 90mg PO BID (max dose), remainder regimen continued  Currently on nitro gtt  Goal SBP<160

## 2024-01-16 NOTE — PROGRESS NOTES
Notified SLIM about patients blood pressure being 188/98 prior to pt going to HD. Per SLIM hold off on prn BP medication at this time.

## 2024-01-17 ENCOUNTER — APPOINTMENT (INPATIENT)
Dept: DIALYSIS | Facility: HOSPITAL | Age: 39
DRG: 064 | End: 2024-01-17
Attending: STUDENT IN AN ORGANIZED HEALTH CARE EDUCATION/TRAINING PROGRAM
Payer: MEDICARE

## 2024-01-17 LAB
ALBUMIN SERPL BCP-MCNC: 3.3 G/DL (ref 3.5–5)
ALP SERPL-CCNC: 74 U/L (ref 34–104)
ALT SERPL W P-5'-P-CCNC: 13 U/L (ref 7–52)
ANION GAP SERPL CALCULATED.3IONS-SCNC: 11 MMOL/L
AST SERPL W P-5'-P-CCNC: 14 U/L (ref 13–39)
BASOPHILS # BLD AUTO: 0.07 THOUSANDS/ÂΜL (ref 0–0.1)
BASOPHILS NFR BLD AUTO: 1 % (ref 0–1)
BILIRUB SERPL-MCNC: 0.44 MG/DL (ref 0.2–1)
BUN SERPL-MCNC: 37 MG/DL (ref 5–25)
CALCIUM ALBUM COR SERPL-MCNC: 8.5 MG/DL (ref 8.3–10.1)
CALCIUM SERPL-MCNC: 7.9 MG/DL (ref 8.4–10.2)
CHLORIDE SERPL-SCNC: 98 MMOL/L (ref 96–108)
CO2 SERPL-SCNC: 27 MMOL/L (ref 21–32)
CREAT SERPL-MCNC: 10.79 MG/DL (ref 0.6–1.3)
EOSINOPHIL # BLD AUTO: 0.44 THOUSAND/ÂΜL (ref 0–0.61)
EOSINOPHIL NFR BLD AUTO: 4 % (ref 0–6)
ERYTHROCYTE [DISTWIDTH] IN BLOOD BY AUTOMATED COUNT: 14.4 % (ref 11.6–15.1)
GFR SERPL CREATININE-BSD FRML MDRD: 5 ML/MIN/1.73SQ M
GLUCOSE SERPL-MCNC: 129 MG/DL (ref 65–140)
GLUCOSE SERPL-MCNC: 129 MG/DL (ref 65–140)
GLUCOSE SERPL-MCNC: 140 MG/DL (ref 65–140)
GLUCOSE SERPL-MCNC: 151 MG/DL (ref 65–140)
GLUCOSE SERPL-MCNC: 183 MG/DL (ref 65–140)
HCT VFR BLD AUTO: 24.4 % (ref 36.5–49.3)
HGB BLD-MCNC: 8.1 G/DL (ref 12–17)
IMM GRANULOCYTES # BLD AUTO: 0.09 THOUSAND/UL (ref 0–0.2)
IMM GRANULOCYTES NFR BLD AUTO: 1 % (ref 0–2)
LYMPHOCYTES # BLD AUTO: 1.31 THOUSANDS/ÂΜL (ref 0.6–4.47)
LYMPHOCYTES NFR BLD AUTO: 11 % (ref 14–44)
MCH RBC QN AUTO: 31.8 PG (ref 26.8–34.3)
MCHC RBC AUTO-ENTMCNC: 33.2 G/DL (ref 31.4–37.4)
MCV RBC AUTO: 96 FL (ref 82–98)
MONOCYTES # BLD AUTO: 1.05 THOUSAND/ÂΜL (ref 0.17–1.22)
MONOCYTES NFR BLD AUTO: 8 % (ref 4–12)
NEUTROPHILS # BLD AUTO: 9.53 THOUSANDS/ÂΜL (ref 1.85–7.62)
NEUTS SEG NFR BLD AUTO: 75 % (ref 43–75)
NRBC BLD AUTO-RTO: 0 /100 WBCS
PLATELET # BLD AUTO: 266 THOUSANDS/UL (ref 149–390)
PMV BLD AUTO: 10.5 FL (ref 8.9–12.7)
POTASSIUM SERPL-SCNC: 4.9 MMOL/L (ref 3.5–5.3)
PROT SERPL-MCNC: 4.9 G/DL (ref 6.4–8.4)
RBC # BLD AUTO: 2.55 MILLION/UL (ref 3.88–5.62)
SODIUM SERPL-SCNC: 136 MMOL/L (ref 135–147)
WBC # BLD AUTO: 12.49 THOUSAND/UL (ref 4.31–10.16)

## 2024-01-17 PROCEDURE — 85025 COMPLETE CBC W/AUTO DIFF WBC: CPT | Performed by: FAMILY MEDICINE

## 2024-01-17 PROCEDURE — 99232 SBSQ HOSP IP/OBS MODERATE 35: CPT | Performed by: NURSE PRACTITIONER

## 2024-01-17 PROCEDURE — 90935 HEMODIALYSIS ONE EVALUATION: CPT | Performed by: STUDENT IN AN ORGANIZED HEALTH CARE EDUCATION/TRAINING PROGRAM

## 2024-01-17 PROCEDURE — 99233 SBSQ HOSP IP/OBS HIGH 50: CPT | Performed by: FAMILY MEDICINE

## 2024-01-17 PROCEDURE — 82948 REAGENT STRIP/BLOOD GLUCOSE: CPT

## 2024-01-17 PROCEDURE — 80053 COMPREHEN METABOLIC PANEL: CPT | Performed by: FAMILY MEDICINE

## 2024-01-17 RX ORDER — HYDRALAZINE HYDROCHLORIDE 50 MG/1
100 TABLET, FILM COATED ORAL 3 TIMES DAILY
Status: DISCONTINUED | OUTPATIENT
Start: 2024-01-17 | End: 2024-01-23 | Stop reason: HOSPADM

## 2024-01-17 RX ORDER — NIFEDIPINE 30 MG/1
90 TABLET, EXTENDED RELEASE ORAL 2 TIMES DAILY
Status: DISCONTINUED | OUTPATIENT
Start: 2024-01-17 | End: 2024-01-23 | Stop reason: HOSPADM

## 2024-01-17 RX ORDER — LISINOPRIL 20 MG/1
40 TABLET ORAL DAILY
Status: DISCONTINUED | OUTPATIENT
Start: 2024-01-17 | End: 2024-01-23 | Stop reason: HOSPADM

## 2024-01-17 RX ORDER — LISINOPRIL 20 MG/1
40 TABLET ORAL DAILY
Status: DISCONTINUED | OUTPATIENT
Start: 2024-01-17 | End: 2024-01-17

## 2024-01-17 RX ORDER — LABETALOL 200 MG/1
800 TABLET, FILM COATED ORAL 3 TIMES DAILY
Status: DISCONTINUED | OUTPATIENT
Start: 2024-01-17 | End: 2024-01-23 | Stop reason: HOSPADM

## 2024-01-17 RX ADMIN — CALCIUM ACETATE 667 MG: 667 CAPSULE ORAL at 07:37

## 2024-01-17 RX ADMIN — LISINOPRIL 40 MG: 20 TABLET ORAL at 12:58

## 2024-01-17 RX ADMIN — CLONIDINE HYDROCHLORIDE 0.3 MG: 0.2 TABLET ORAL at 21:18

## 2024-01-17 RX ADMIN — NITROGLYCERIN 90 MCG/MIN: 20 INJECTION INTRAVENOUS at 02:24

## 2024-01-17 RX ADMIN — NIFEDIPINE 90 MG: 30 TABLET, FILM COATED, EXTENDED RELEASE ORAL at 07:38

## 2024-01-17 RX ADMIN — CINACALCET 60 MG: 30 TABLET ORAL at 12:56

## 2024-01-17 RX ADMIN — ASPIRIN 81 MG CHEWABLE TABLET 81 MG: 81 TABLET CHEWABLE at 12:56

## 2024-01-17 RX ADMIN — LABETALOL HYDROCHLORIDE 800 MG: 200 TABLET, FILM COATED ORAL at 07:38

## 2024-01-17 RX ADMIN — HYDRALAZINE HYDROCHLORIDE 100 MG: 50 TABLET ORAL at 07:38

## 2024-01-17 RX ADMIN — HYDRALAZINE HYDROCHLORIDE 100 MG: 50 TABLET ORAL at 21:18

## 2024-01-17 RX ADMIN — LABETALOL HYDROCHLORIDE 800 MG: 200 TABLET, FILM COATED ORAL at 17:15

## 2024-01-17 RX ADMIN — HYDRALAZINE HYDROCHLORIDE 20 MG: 20 INJECTION, SOLUTION INTRAMUSCULAR; INTRAVENOUS at 02:24

## 2024-01-17 RX ADMIN — ESCITALOPRAM OXALATE 20 MG: 20 TABLET ORAL at 21:18

## 2024-01-17 RX ADMIN — LABETALOL HYDROCHLORIDE 800 MG: 200 TABLET, FILM COATED ORAL at 21:18

## 2024-01-17 RX ADMIN — HYDRALAZINE HYDROCHLORIDE 100 MG: 50 TABLET ORAL at 17:15

## 2024-01-17 RX ADMIN — NIFEDIPINE 90 MG: 30 TABLET, FILM COATED, EXTENDED RELEASE ORAL at 21:24

## 2024-01-17 RX ADMIN — GABAPENTIN 300 MG: 300 CAPSULE ORAL at 12:56

## 2024-01-17 RX ADMIN — ATORVASTATIN CALCIUM 40 MG: 40 TABLET, FILM COATED ORAL at 17:15

## 2024-01-17 RX ADMIN — CALCIUM ACETATE 667 MG: 667 CAPSULE ORAL at 12:56

## 2024-01-17 RX ADMIN — SENNOSIDES, DOCUSATE SODIUM 1 TABLET: 8.6; 5 TABLET ORAL at 17:15

## 2024-01-17 RX ADMIN — CLONIDINE HYDROCHLORIDE 0.3 MG: 0.2 TABLET ORAL at 12:56

## 2024-01-17 RX ADMIN — CLONIDINE HYDROCHLORIDE 0.3 MG: 0.2 TABLET ORAL at 06:14

## 2024-01-17 RX ADMIN — NITROGLYCERIN 90 MCG/MIN: 20 INJECTION INTRAVENOUS at 16:12

## 2024-01-17 RX ADMIN — SENNOSIDES, DOCUSATE SODIUM 1 TABLET: 8.6; 5 TABLET ORAL at 12:56

## 2024-01-17 RX ADMIN — FAMOTIDINE 40 MG: 20 TABLET, FILM COATED ORAL at 12:58

## 2024-01-17 RX ADMIN — Medication 20 MG: at 04:10

## 2024-01-17 NOTE — ASSESSMENT & PLAN NOTE
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 Mittal, 1/5/2024, 1/12/2024)  Presented on 1/4/2024 with MONTILLA that started on 12/31/23  Hx ASA use for hx multiple strokes, reversed with DDAVP on arrival  MRIs negative for source of SAH; small scattered acute/subacute infarcts noted.   Pt on hemodialysis for CKD.   1/12-1/13 started with new hand weakness and facial droop - multiple new areas of embolic infarcts to left PCA and bilateral MCA.    Imaging:  MRI brain wo, 1/14/2023: 1. Crescendo multifocal acute/recent infarctions involving at least 3 discrete vascular territories (left posterior cerebral artery and bilateral middle cerebral artery territories), worrisome for multifocal new/interval embolic infarctions. Differential diagnosis could include watershed infarctions in the appropriate clinical setting. These recent/acute infarctions are new/superimposed in the setting of previously present smaller foci of diffusion restriction, indicative of progressive/new interval infarctions since 1/8/2024 2. 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 No significant mass effect. 3. White matter changes likely correlate to areas of recent infarction. 4. Right mastoid air cell effusion. Mastoiditis not excluded.    Plan:  Continue to monitor neuro exam closely.    GCS 15 with RUE weakness and ataxia, right facial weakness.  STAT CTA with decline in GCS > 2 pts in 1 hour.  Maintain SBP < 160 mmHg.  Requiring multiple Hypertensive medications and started nitro gtt overnight  Pt with hx of strokes. Continue ASA 81mg.   Mobilize as tolerated.  Disposition: requesting ARC secondary to new strokes.   PM&R consult appreciated. Needs to be able to complete 3h of therapy a day, currently limited 2/2 BP control  DVT ppx: SCD's, can resume dvt ppx at this time from nsx  standpoint    Neurosurgery will continue to follow.   Will plan for follow up in 6 weeks time with repeat MRI brain w wo contrast and MRA head wo contrast Call with questions.

## 2024-01-17 NOTE — ARC ADMISSION
ARC  contacted the patient and or family: introduced self, explained role, reviewed ARC program, services offered, acute rehab criteria, approval process, insurance authorization process, ARC locations and preferences. Patient / family preferred have indicated interest in alternate Acute Rehabs. [facility] reviewed with the family. Family wished to speak to CM. Information forwarded to the CM. Patient / family made aware ARC Reviewer will keep their Care Manager updated regarding referral status.

## 2024-01-17 NOTE — PROGRESS NOTES
"[facility]  Progress Note  Name: Mateus Mckeon I  MRN: [id number]  Unit/Bed#: [facility] 718-01 I Date of Admission: 1/5/2024   Date of Service: 1/17/2024 I Hospital Day: 12    Assessment/Plan   * Acute CVA (cerebrovascular accident) (HCC)  Assessment & Plan  RUE weakness with mild facial droop 1/12-13  MRI brain \"Crescendo multifocal acute/recent infarctions involving at least 3 discrete vascular territories (left posterior cerebral artery and bilateral middle cerebral artery territories), worrisome for multifocal new/interval embolic infarctions\"  Continue neuro-checks  ASA 81 mg daily and atorvastatin 40mg daily.   Telemetry.  No events  Echo 1/5 no PFO  Cleared for neurology standpoint   Patient will need to Follow up with Neurosurgery in 6 weeks for MRI  PT/OT/PMR    Hypertensive emergency  Assessment & Plan  Nitro Drip started on 1/16/2023  Currently on weekly clonidine patch , hydralazine 100mg TID, lisinopril 40mg qd, labetalol 800 mg TID, procardia (increased to 60mg am/90mg pm)  Prn IV labetalol and hydralazine - has been receiving multiple doses  Further volume removal by HD  Discussed with nephrology. Appreciate input.       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 Mittal, 1/5/2024, 1/12/2024)4.   On ASA for previous strokes reversed with DDAVP.  MRIs negative for source of SAH, small scattered acute/subacute infarcts noted.   CT head wo 1/9/24: Improving subarachnoid hemorrhage compared to recent prior studies. No new findings.     Plan:  ASA 81 mg daily resumed and Nimodipine discontinued per NSX recs 1/12.  No further need for SAH pathway per NSx.  Continue neurochecks  BP control has been very challenging. Goal of SBP<160      End stage kidney disease (HCC)  Assessment & Plan  Dialysis per " nephrology     Anemia in chronic kidney disease, on chronic dialysis (HCC)  Assessment & Plan  Lab Results   Component Value Date    EGFR 5 01/17/2024    EGFR 7 01/16/2024    EGFR 4 01/15/2024    CREATININE 10.79 (H) 01/17/2024    CREATININE 7.91 (H) 01/16/2024    CREATININE 11.46 (H) 01/15/2024   hgb is stable  Continue to monitor   Dialysis MWF    Type 1 diabetes mellitus (HCC)  Assessment & Plan  Lab Results   Component Value Date    HGBA1C 6.0 (H) 01/14/2024       Recent Labs     01/16/24  1200 01/16/24  1813 01/17/24  0121 01/17/24  0620   POCGLU 137 196* 129 151*     Managed with insulin pump.    Blood Sugar Average: Last 72 hrs:  (P) 160.4375    Blood glucose very well controlled  Continue using pump  Continue to monitor                VTE Pharmacologic Prophylaxis: VTE Score: 7 Moderate Risk (Score 3-4) - Pharmacological DVT Prophylaxis Contraindicated. Sequential Compression Devices Ordered.    Mobility:   Basic Mobility Inpatient Raw Score: 17  JH-HLM Goal: 5: Stand one or more mins  JH-HLM Achieved: 3: Sit at edge of bed  HLM Goal NOT achieved. Continue with multidisciplinary rounding and encourage appropriate mobility to improve upon HLM goals.    Patient Centered Rounds: I performed bedside rounds with nursing staff today.   Discussions with Specialists or Other Care Team Provider: Nephrology regarding dialysis and blood pressure management.  Patient is on maximal therapy of his oral antihypertensive medication.      Education and Discussions with Family / Patient: Updated  (mother) at bedside.    Total Time Spent on Date of Encounter in care of patient: 65 mins. This time was spent on one or more of the following: performing physical exam; counseling and coordination of care; obtaining or reviewing history; documenting in the medical record; reviewing/ordering tests, medications or procedures; communicating with other healthcare professionals and discussing with patient's  family/caregivers.    Current Length of Stay: 12 day(s)  Current Patient Status: Inpatient   Certification Statement: The patient will continue to require additional inpatient hospital stay due to blood pressure control in the setting of a subarachnoid hemorrhage  Discharge Plan: Anticipate discharge in 24-48 hrs to rehab facility.    Code Status: Level 1 - Full Code    Subjective:   This is a very pleasant 38-year-old gentleman who was seen and evaluated today at bedside.  Patient went to dialysis this morning.  Aside from feeling fatigued patient has no further complaints at this time.    Objective:     Vitals:   Temp (24hrs), Av.3 °F (36.8 °C), Min:98 °F (36.7 °C), Max:98.6 °F (37 °C)    Temp:  [98 °F (36.7 °C)-98.6 °F (37 °C)] 98.5 °F (36.9 °C)  HR:  [70-81] 76  Resp:  [12-25] 16  BP: (140-197)/() 146/70  SpO2:  [95 %] 95 %  Body mass index is 34.8 kg/m².     Input and Output Summary (last 24 hours):     Intake/Output Summary (Last 24 hours) at 2024 1535  Last data filed at 2024 1230  Gross per 24 hour   Intake 741 ml   Output 4500 ml   Net -3759 ml       Physical Exam:   Physical Exam  Vitals reviewed.   Constitutional:       General: He is not in acute distress.     Appearance: Normal appearance. He is not ill-appearing.   HENT:      Head: Normocephalic and atraumatic.      Right Ear: External ear normal.      Left Ear: External ear normal.      Nose: Nose normal.   Eyes:      Extraocular Movements: Extraocular movements intact.      Conjunctiva/sclera: Conjunctivae normal.   Cardiovascular:      Rate and Rhythm: Normal rate and regular rhythm.      Pulses: Normal pulses.      Heart sounds: Normal heart sounds.   Pulmonary:      Effort: Pulmonary effort is normal.      Breath sounds: Normal breath sounds.   Abdominal:      General: Abdomen is flat.      Palpations: Abdomen is soft.   Musculoskeletal:         General: Normal range of motion.      Cervical back: Normal range of motion.    Skin:     General: Skin is warm and dry.   Neurological:      Mental Status: He is alert.      Comments: Right upper extremity weakness   Psychiatric:         Mood and Affect: Mood normal.         Behavior: Behavior normal.          Additional Data:     Labs:  Results from last 7 days   Lab Units 01/17/24  0621   WBC Thousand/uL 12.49*   HEMOGLOBIN g/dL 8.1*   HEMATOCRIT % 24.4*   PLATELETS Thousands/uL 266   NEUTROS PCT % 75   LYMPHS PCT % 11*   MONOS PCT % 8   EOS PCT % 4     Results from last 7 days   Lab Units 01/17/24  0621   SODIUM mmol/L 136   POTASSIUM mmol/L 4.9   CHLORIDE mmol/L 98   CO2 mmol/L 27   BUN mg/dL 37*   CREATININE mg/dL 10.79*   ANION GAP mmol/L 11   CALCIUM mg/dL 7.9*   ALBUMIN g/dL 3.3*   TOTAL BILIRUBIN mg/dL 0.44   ALK PHOS U/L 74   ALT U/L 13   AST U/L 14   GLUCOSE RANDOM mg/dL 140     Results from last 7 days   Lab Units 01/12/24  0623   INR  1.05     Results from last 7 days   Lab Units 01/17/24  1249 01/17/24  0620 01/17/24  0121 01/16/24  1813 01/16/24  1200 01/16/24  0703 01/15/24  1602 01/15/24  1302 01/15/24  0845 01/15/24  0709 01/15/24  0036 01/14/24  2120   POC GLUCOSE mg/dl 129 151* 129 196* 137 131 181* 128 187* 126 133 175*     Results from last 7 days   Lab Units 01/14/24  0604   HEMOGLOBIN A1C % 6.0*           Lines/Drains:  Invasive Devices       Peripheral Intravenous Line  Duration             Peripheral IV 01/17/24 Right;Ventral (anterior) Forearm <1 day              Line  Duration             Hemodialysis AV Fistula 11/09/20 Left Other (Comment) 1164 days                          Imaging: No pertinent imaging reviewed.    Recent Cultures (last 7 days):         Last 24 Hours Medication List:   Current Facility-Administered Medications   Medication Dose Route Frequency Provider Last Rate    aspirin  81 mg Oral Daily Malu Moore PA-C      atorvastatin  40 mg Oral QPM Radha Steen MD      bisacodyl  10 mg Rectal Daily PRN Radha Steen MD      calcium acetate  667 mg Oral  TID With Meals Radha Steen MD      cinacalcet  60 mg Oral Daily Radha Steen MD      cloNIDine  0.3 mg Oral Q8H HALIE Valeria Forrester PA-C      escitalopram  20 mg Oral HS Bryan Claire MD      famotidine  40 mg Oral Daily Radha Steen MD      gabapentin  300 mg Oral Daily Radha Steen MD      hydrALAZINE  20 mg Intravenous Q4H PRN Court Edwards MD      hydrALAZINE  100 mg Oral TID Valeria Forrester PA-C      insulin lispro  300 Units Subcutaneous Insulin Pump Daily PRN Radha Steen MD      labetalol  20 mg Intravenous Q4H PRN Valeria Forrester PA-C      labetalol  800 mg Oral TID Valeria Forrester PA-C      lisinopril  40 mg Oral Daily Valeria Forrester PA-C      melatonin  6 mg Oral HS PRN Cinthia Dempsey MD      NIFEdipine  90 mg Oral BID Valeria Forrester PA-C      nitroGLYcerin  5-200 mcg/min Intravenous Titrated TRACY JayC 110 mcg/min (01/17/24 1518)    ondansetron  4 mg Intravenous Q4H PRN Radha Steen MD      oxyCODONE  2.5 mg Oral Q4H PRN Radha Steen MD      Or    oxyCODONE  5 mg Oral Q4H PRN Radha Steen MD      patient maintained insulin pump  1 each Subcutaneous Q8H Radha Steen MD      polyethylene glycol  17 g Oral Daily Radha Steen MD      prochlorperazine  5 mg Oral Q6H PRN Radha Steen MD      senna-docusate sodium  1 tablet Oral BID Radha Steen MD          Today, Patient Was Seen By: Bryan Claire MD    **Please Note: This note may have been constructed using a voice recognition system.**

## 2024-01-17 NOTE — PLAN OF CARE
Problem: PAIN - ADULT  Goal: Verbalizes/displays adequate comfort level or baseline comfort level  Description: Interventions:  - Encourage patient to monitor pain and request assistance  - Assess pain using appropriate pain scale  - Administer analgesics based on type and severity of pain and evaluate response  - Implement non-pharmacological measures as appropriate and evaluate response  - Consider cultural and social influences on pain and pain management  - Notify physician/advanced practitioner if interventions unsuccessful or patient reports new pain  Outcome: Progressing     Problem: INFECTION - ADULT  Goal: Absence or prevention of progression during hospitalization  Description: INTERVENTIONS:  - Assess and monitor for signs and symptoms of infection  - Monitor lab/diagnostic results  - Monitor all insertion sites, i.e. indwelling lines, tubes, and drains  - Monitor endotracheal if appropriate and nasal secretions for changes in amount and color  - [place] appropriate cooling/warming therapies per order  - Administer medications as ordered  - Instruct and encourage patient and family to use good hand hygiene technique  - Identify and instruct in appropriate isolation precautions for identified infection/condition  Outcome: Progressing     Problem: SAFETY ADULT  Goal: Patient will remain free of falls  Description: INTERVENTIONS:  - Educate patient/family on patient safety including physical limitations  - Instruct patient to call for assistance with activity   - Consult OT/PT to assist with strengthening/mobility   - Keep Call bell within reach  - Keep bed low and locked with side rails adjusted as appropriate  - Keep care items and personal belongings within reach  - Initiate and maintain comfort rounds  - Make Fall Risk Sign visible to staff  - Offer Toileting every 3 Hours, in advance of need  - Initiate/Maintain bed alarm  - Obtain necessary fall risk management equipment:   - Apply yellow socks and  bracelet for high fall risk patients  - Consider moving patient to room near nurses station  Outcome: Progressing  Goal: Maintain or return to baseline ADL function  Description: INTERVENTIONS:  -  Assess patient's ability to carry out ADLs; assess patient's baseline for ADL function and identify physical deficits which impact ability to perform ADLs (bathing, care of mouth/teeth, toileting, grooming, dressing, etc.)  - Assess/evaluate cause of self-care deficits   - Assess range of motion  - Assess patient's mobility; develop plan if impaired  - Assess patient's need for assistive devices and provide as appropriate  - Encourage maximum independence but intervene and supervise when necessary  - Involve family in performance of ADLs  - Assess for home care needs following discharge   - Consider OT consult to assist with ADL evaluation and planning for discharge  - Provide patient education as appropriate  Outcome: Progressing  Goal: Maintains/Returns to pre admission functional level  Description: INTERVENTIONS:  - Perform AM-PAC 6 Click Basic Mobility/ Daily Activity assessment daily.  - Set and communicate daily mobility goal to care team and patient/family/caregiver.   - Collaborate with rehabilitation services on mobility goals if consulted  - Perform Range of Motion 3 times a day.  - Reposition patient every 3 hours.  - Dangle patient 3 times a day  - Stand patient 3 times a day  - Ambulate patient 3 times a day  - Out of bed to chair 3 times a day   - Out of bed for meals 3 times a day  - Out of bed for toileting  - Record patient progress and toleration of activity level   Outcome: Progressing     Problem: DISCHARGE PLANNING  Goal: Discharge to home or other facility with appropriate resources  Description: INTERVENTIONS:  - Identify barriers to discharge w/patient and caregiver  - Arrange for needed discharge resources and transportation as appropriate  - Identify discharge learning needs (meds, wound care,  etc.)  - Arrange for interpretive services to assist at discharge as needed  - Refer to Case Management Department for coordinating discharge planning if the patient needs post-hospital services based on physician/advanced practitioner order or complex needs related to functional status, cognitive ability, or social support system  Outcome: Progressing     Problem: Knowledge Deficit  Goal: Patient/family/caregiver demonstrates understanding of disease process, treatment plan, medications, and discharge instructions  Description: Complete learning assessment and assess knowledge base.  Interventions:  - Provide teaching at level of understanding  - Provide teaching via preferred learning methods  Outcome: Progressing     Problem: Nutrition/Hydration-ADULT  Goal: Nutrient/Hydration intake appropriate for improving, restoring or maintaining nutritional needs  Description: Monitor and assess patient's nutrition/hydration status for malnutrition. Collaborate with interdisciplinary team and initiate plan and interventions as ordered.  Monitor patient's weight and dietary intake as ordered or per policy. Utilize nutrition screening tool and intervene as necessary. Determine patient's food preferences and provide high-protein, high-caloric foods as appropriate.     INTERVENTIONS:  - Monitor oral intake, urinary output, labs, and treatment plans  - Assess nutrition and hydration status and recommend course of action  - Evaluate amount of meals eaten  - Assist patient with eating if necessary   - Allow adequate time for meals  - Recommend/ encourage appropriate diets, oral nutritional supplements, and vitamin/mineral supplements  - Order, calculate, and assess calorie counts as needed  - Recommend, monitor, and adjust tube feedings and TPN/PPN based on assessed needs  - Assess need for intravenous fluids  - Provide specific nutrition/hydration education as appropriate  - Include patient/family/caregiver in decisions related  to nutrition  Outcome: Progressing     Problem: METABOLIC, FLUID AND ELECTROLYTES - ADULT  Goal: Electrolytes maintained within normal limits  Description: INTERVENTIONS:  - Monitor labs and assess patient for signs and symptoms of electrolyte imbalances  - Administer electrolyte replacement as ordered  - Monitor response to electrolyte replacements, including repeat lab results as appropriate  - Instruct patient on fluid and nutrition as appropriate  Outcome: Progressing  Goal: Fluid balance maintained  Description: INTERVENTIONS:  - Monitor labs   - Monitor I/O and WT  - Instruct patient on fluid and nutrition as appropriate  - Assess for signs & symptoms of volume excess or deficit  Outcome: Progressing     Problem: Prexisting or High Potential for Compromised Skin Integrity  Goal: Skin integrity is maintained or improved  Description: INTERVENTIONS:  - Identify patients at risk for skin breakdown  - Assess and monitor skin integrity  - Assess and monitor nutrition and hydration status  - Monitor labs   - Assess for incontinence   - Turn and reposition patient  - Assist with mobility/ambulation  - Relieve pressure over bony prominences  - Avoid friction and shearing  - Provide appropriate hygiene as needed including keeping skin clean and dry  - Evaluate need for skin moisturizer/barrier cream  - Collaborate with interdisciplinary team   - Patient/family teaching  - Consider wound care consult   Outcome: Progressing

## 2024-01-17 NOTE — PLAN OF CARE
Problem: PAIN - ADULT  Goal: Verbalizes/displays adequate comfort level or baseline comfort level  Description: Interventions:  - Encourage patient to monitor pain and request assistance  - Assess pain using appropriate pain scale  - Administer analgesics based on type and severity of pain and evaluate response  - Implement non-pharmacological measures as appropriate and evaluate response  - Consider cultural and social influences on pain and pain management  - Notify physician/advanced practitioner if interventions unsuccessful or patient reports new pain  1/17/2024 0239 by Nery Lima RN  Outcome: Progressing  1/17/2024 0239 by Nery Lima RN  Outcome: Progressing  1/17/2024 0239 by Nery Lima RN  Outcome: Progressing     Problem: INFECTION - ADULT  Goal: Absence or prevention of progression during hospitalization  Description: INTERVENTIONS:  - Assess and monitor for signs and symptoms of infection  - Monitor lab/diagnostic results  - Monitor all insertion sites, i.e. indwelling lines, tubes, and drains  - Monitor endotracheal if appropriate and nasal secretions for changes in amount and color  - [place] appropriate cooling/warming therapies per order  - Administer medications as ordered  - Instruct and encourage patient and family to use good hand hygiene technique  - Identify and instruct in appropriate isolation precautions for identified infection/condition  1/17/2024 0239 by Nery Lima RN  Outcome: Progressing  1/17/2024 0239 by Nery Lima RN  Outcome: Progressing  1/17/2024 0239 by Nery Lima RN  Outcome: Progressing     Problem: SAFETY ADULT  Goal: Patient will remain free of falls  Description: INTERVENTIONS:  - Educate patient/family on patient safety including physical limitations  - Instruct patient to call for assistance with activity   - Consult OT/PT to assist with strengthening/mobility   - Keep Call bell within reach  - Keep bed low and locked with side  rails adjusted as appropriate  - Keep care items and personal belongings within reach  - Initiate and maintain comfort rounds  - Make Fall Risk Sign visible to staff  - Offer Toileting every 2 Hours, in advance of need  - Apply yellow socks and bracelet for high fall risk patients  - Consider moving patient to room near nurses station  1/17/2024 0239 by Nery Lima RN  Outcome: Progressing  1/17/2024 0239 by Nery Lima RN  Outcome: Progressing  1/17/2024 0239 by Nery Lima RN  Outcome: Progressing  Goal: Maintain or return to baseline ADL function  Description: INTERVENTIONS:  -  Assess patient's ability to carry out ADLs; assess patient's baseline for ADL function and identify physical deficits which impact ability to perform ADLs (bathing, care of mouth/teeth, toileting, grooming, dressing, etc.)  - Assess/evaluate cause of self-care deficits   - Assess range of motion  - Assess patient's mobility; develop plan if impaired  - Assess patient's need for assistive devices and provide as appropriate  - Encourage maximum independence but intervene and supervise when necessary  - Involve family in performance of ADLs  - Assess for home care needs following discharge   - Consider OT consult to assist with ADL evaluation and planning for discharge  - Provide patient education as appropriate  1/17/2024 0239 by Nery Lima RN  Outcome: Progressing  1/17/2024 0239 by Nery Lima RN  Outcome: Progressing  1/17/2024 0239 by Nery Lima RN  Outcome: Progressing  Goal: Maintains/Returns to pre admission functional level  Description: INTERVENTIONS:  - Perform AM-PAC 6 Click Basic Mobility/ Daily Activity assessment daily.  - Set and communicate daily mobility goal to care team and patient/family/caregiver.   - Collaborate with rehabilitation services on mobility goals if consulted  - Perform Range of Motion 2 times a day.  - Reposition patient every 2 hours.  - Dangle patient 2 times a  day  - Stand patient 2 times a day  - Ambulate patient 2 times a day  - Out of bed to chair 2 times a day   - Out of bed for meals 2 times a day  - Out of bed for toileting  - Record patient progress and toleration of activity level   1/17/2024 0239 by Nery Lima RN  Outcome: Progressing  1/17/2024 0239 by Nery Lima RN  Outcome: Progressing  1/17/2024 0239 by Nery Lima RN  Outcome: Progressing     Problem: DISCHARGE PLANNING  Goal: Discharge to home or other facility with appropriate resources  Description: INTERVENTIONS:  - Identify barriers to discharge w/patient and caregiver  - Arrange for needed discharge resources and transportation as appropriate  - Identify discharge learning needs (meds, wound care, etc.)  - Arrange for interpretive services to assist at discharge as needed  - Refer to Case Management Department for coordinating discharge planning if the patient needs post-hospital services based on physician/advanced practitioner order or complex needs related to functional status, cognitive ability, or social support system  1/17/2024 0239 by Nery Lima RN  Outcome: Progressing  1/17/2024 0239 by Nery Lima RN  Outcome: Progressing  1/17/2024 0239 by Nery Lima RN  Outcome: Progressing     Problem: Knowledge Deficit  Goal: Patient/family/caregiver demonstrates understanding of disease process, treatment plan, medications, and discharge instructions  Description: Complete learning assessment and assess knowledge base.  Interventions:  - Provide teaching at level of understanding  - Provide teaching via preferred learning methods  1/17/2024 0239 by Nery Lima RN  Outcome: Progressing  1/17/2024 0239 by Nery Lima RN  Outcome: Progressing  1/17/2024 0239 by Nery Lima RN  Outcome: Progressing     Problem: Nutrition/Hydration-ADULT  Goal: Nutrient/Hydration intake appropriate for improving, restoring or maintaining nutritional  needs  Description: Monitor and assess patient's nutrition/hydration status for malnutrition. Collaborate with interdisciplinary team and initiate plan and interventions as ordered.  Monitor patient's weight and dietary intake as ordered or per policy. Utilize nutrition screening tool and intervene as necessary. Determine patient's food preferences and provide high-protein, high-caloric foods as appropriate.     INTERVENTIONS:  - Monitor oral intake, urinary output, labs, and treatment plans  - Assess nutrition and hydration status and recommend course of action  - Evaluate amount of meals eaten  - Assist patient with eating if necessary   - Allow adequate time for meals  - Recommend/ encourage appropriate diets, oral nutritional supplements, and vitamin/mineral supplements  - Order, calculate, and assess calorie counts as needed  - Recommend, monitor, and adjust tube feedings and TPN/PPN based on assessed needs  - Assess need for intravenous fluids  - Provide specific nutrition/hydration education as appropriate  - Include patient/family/caregiver in decisions related to nutrition  1/17/2024 0239 by Nery Lima RN  Outcome: Progressing  1/17/2024 0239 by Nery Lima RN  Outcome: Progressing  1/17/2024 0239 by Nery Lima RN  Outcome: Progressing     Problem: METABOLIC, FLUID AND ELECTROLYTES - ADULT  Goal: Electrolytes maintained within normal limits  Description: INTERVENTIONS:  - Monitor labs and assess patient for signs and symptoms of electrolyte imbalances  - Administer electrolyte replacement as ordered  - Monitor response to electrolyte replacements, including repeat lab results as appropriate  - Instruct patient on fluid and nutrition as appropriate  1/17/2024 0239 by Nery Lima RN  Outcome: Progressing  1/17/2024 0239 by Nery Lima RN  Outcome: Progressing  1/17/2024 0239 by Nery Lima RN  Outcome: Progressing  Goal: Fluid balance maintained  Description:  INTERVENTIONS:  - Monitor labs   - Monitor I/O and WT  - Instruct patient on fluid and nutrition as appropriate  - Assess for signs & symptoms of volume excess or deficit  1/17/2024 0239 by Nery Lima RN  Outcome: Progressing  1/17/2024 0239 by Nery Lima RN  Outcome: Progressing  1/17/2024 0239 by Nery Lima RN  Outcome: Progressing     Problem: Prexisting or High Potential for Compromised Skin Integrity  Goal: Skin integrity is maintained or improved  Description: INTERVENTIONS:  - Identify patients at risk for skin breakdown  - Assess and monitor skin integrity  - Assess and monitor nutrition and hydration status  - Monitor labs   - Assess for incontinence   - Turn and reposition patient  - Assist with mobility/ambulation  - Relieve pressure over bony prominences  - Avoid friction and shearing  - Provide appropriate hygiene as needed including keeping skin clean and dry  - Evaluate need for skin moisturizer/barrier cream  - Collaborate with interdisciplinary team   - Patient/family teaching  - Consider wound care consult   1/17/2024 0239 by Nery Lima RN  Outcome: Progressing  1/17/2024 0239 by Nery Lima RN  Outcome: Progressing  1/17/2024 0239 by Nery Lima RN  Outcome: Progressing

## 2024-01-17 NOTE — QUICK NOTE
"Fingerstick blood glucose logs reviewed and reveal adequate glycemic control.    Patient confirms OmniPod site changed today. Still does not recall when Dexcom was placed although he has not received a notification on his phone to change his sensor yet. Next infusion site change in 3 days (1/20/23).    Please Tigertext questions to the clinician covering the \"CST-Ryn[facility]-S[facility]\" Role. Thank you.    "

## 2024-01-17 NOTE — PROGRESS NOTES
Provider contacted regarding pt. Request to take BP meds before dialysis. Meds retimed for 8a and given before dialysis. Nitro gtt on hold for dialysis discussed w/ provider.

## 2024-01-17 NOTE — RESTORATIVE TECHNICIAN NOTE
Restorative Technician Note      Patient Name: Mateus Mckeon     Note Type: Mobility (Attempted to see pt, pt currently off the unit.)  Savi Plunkett BS, Restorative Technician,

## 2024-01-17 NOTE — PROGRESS NOTES
[facility]  Progress Note  Name: Mateus Mckeon I  MRN: [id number]  Unit/Bed#: [facility] 718-01 I Date of Admission: 1/5/2024   Date of Service: 1/17/2024 I Hospital Day: 12    Assessment/Plan   Subarachnoid hemorrhage (HCC)  Assessment & Plan  Right basilar cistern SAH of unclear etiology  BD 18, HH 2, MF 3  S/p Cerebral angiography x 2 - both negative (Dr. Mittal, 1/5/2024, 1/12/2024)  Presented on 1/4/2024 with MONTILLA that started on 12/31/23  Hx ASA use for hx multiple strokes, reversed with DDAVP on arrival  MRIs negative for source of SAH; small scattered acute/subacute infarcts noted.   Pt on hemodialysis for CKD.   1/12-1/13 started with new hand weakness and facial droop - multiple new areas of embolic infarcts to left PCA and bilateral MCA.    Imaging:  MRI brain wo, 1/14/2023: 1. Crescendo multifocal acute/recent infarctions involving at least 3 discrete vascular territories (left posterior cerebral artery and bilateral middle cerebral artery territories), worrisome for multifocal new/interval embolic infarctions. Differential diagnosis could include watershed infarctions in the appropriate clinical setting. These recent/acute infarctions are new/superimposed in the setting of previously present smaller foci of diffusion restriction, indicative of progressive/new interval infarctions since 1/8/2024 2. 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 No significant mass effect. 3. White matter changes likely correlate to areas of recent infarction. 4. Right mastoid air cell effusion. Mastoiditis not excluded.    Plan:  Continue to monitor neuro exam closely.    GCS 15 with RUE weakness and ataxia, right facial weakness.  STAT CTA with decline in GCS > 2 pts in 1 hour.  Maintain SBP < 160 mmHg.  Requiring multiple Hypertensive medications and started nitro gtt overnight  Pt with  "hx of strokes. Continue ASA 81mg.   Mobilize as tolerated.  Disposition: requesting ARC secondary to new strokes.   PM&R consult appreciated. Needs to be able to complete 3h of therapy a day, currently limited 2/2 BP control  DVT ppx: SCD's, can resume dvt ppx at this time from nsx standpoint    Neurosurgery will continue to follow.   Will plan for follow up in 6 weeks time with repeat MRI brain w wo contrast and MRA head wo contrast Call with questions.      End stage kidney disease (HCC)  Assessment & Plan  Lab Results   Component Value Date    EGFR 5 01/17/2024    EGFR 7 01/16/2024    EGFR 4 01/15/2024    CREATININE 10.79 (H) 01/17/2024    CREATININE 7.91 (H) 01/16/2024    CREATININE 11.46 (H) 01/15/2024     On HD (MWF)    Type 1 diabetes mellitus (HCC)  Assessment & Plan  Lab Results   Component Value Date    HGBA1C 6.0 (H) 01/14/2024       Recent Labs     01/16/24  1200 01/16/24  1813 01/17/24  0121 01/17/24  0620   POCGLU 137 196* 129 151*         Blood Sugar Average: Last 72 hrs:  (P) 160.4375    Continue management per primary team           Subjective/Objective   Chief Complaint: \"I'm tired.\"    Subjective: States right arm is much better. Feels tired but this is common for him during dialysis.    Objective: NAD. RUE strength improved 4/5.    I/O         01/15 0701  01/16 0700 01/16 0701  01/17 0700 01/17 0701  01/18 0700    P.O. 240 221     I.V. (mL/kg) 470 (4.9)  200 (2.1)    Total Intake(mL/kg) 710 (7.5) 221 (2.3) 200 (2.1)    Other 1659      Total Output 1659      Net -949 +221 +200                   Invasive Devices       Peripheral Intravenous Line  Duration             Peripheral IV 01/17/24 Right;Ventral (anterior) Forearm <1 day              Line  Duration             Hemodialysis AV Fistula 11/09/20 Left Other (Comment) 1163 days                    Physical Exam:  Vitals: Blood pressure (!) 175/100, pulse 77, temperature 98.2 °F (36.8 °C), temperature source Oral, resp. rate 16, height 5' 5\" " "(1.651 m), weight 94.9 kg (209 lb 2.4 oz), SpO2 96%.,Body mass index is 34.8 kg/m².        General appearance: alert, appears stated age, cooperative and no distress  Head: Normocephalic, without obvious abnormality, atraumatic  Eyes: EOMI, PERRL  Neck: supple, symmetrical, trachea midline and NT  Back: no kyphosis present, no tenderness to percussion or palpation  Lungs: non labored breathing  Heart: regular heart rate  Neurologic:   Mental status: Alert, oriented x3, thought content appropriate  Cranial nerves: grossly intact (Cranial nerves II-XII)  Sensory: normal to LT  Motor: RUE 4/5      Lab Results:  Results from last 7 days   Lab Units 01/17/24  0621 01/16/24  0525 01/15/24  0904 01/14/24  0604 01/13/24  0548   WBC Thousand/uL 12.49* 7.73 8.66   < > 7.83   HEMOGLOBIN g/dL 8.1* 8.4* 8.9*   < > 9.5*   HEMATOCRIT % 24.4* 25.3* 27.0*   < > 28.5*   PLATELETS Thousands/uL 266 260 254   < > 273   NEUTROS PCT % 75  --  67  --  69   MONOS PCT % 8  --  8  --  7   EOS PCT % 4  --  7*  --  6    < > = values in this interval not displayed.     Results from last 7 days   Lab Units 01/17/24  0621 01/16/24  0525 01/15/24  0904 01/12/24  0623 01/11/24  0522   SODIUM mmol/L 136 138 132*   < > 138   POTASSIUM mmol/L 4.9 4.7 5.1   < > 4.1   CHLORIDE mmol/L 98 101 94*   < > 99   CO2 mmol/L 27 28 25   < > 28   BUN mg/dL 37* 28* 52*   < > 25   CREATININE mg/dL 10.79* 7.91* 11.46*   < > 6.69*   CALCIUM mg/dL 7.9* 8.0* 7.9*   < > 8.2*   ALK PHOS U/L 74  --   --   --  64   ALT U/L 13  --   --   --  9   AST U/L 14  --   --   --  13    < > = values in this interval not displayed.             Results from last 7 days   Lab Units 01/12/24  0623   INR  1.05   PTT seconds 30     No results found for: \"TROPONINT\"  ABG:  Lab Results   Component Value Date    PHART 7.454 (H) 02/21/2020    VMG2KFT 33.2 (L) 02/21/2020    PO2ART 170.0 (H) 02/21/2020    WMN7BCY 22.8 02/21/2020    BEART -0.8 02/21/2020    SOURCE Radial, Right 02/21/2020 "       Imaging Studies: I have personally reviewed pertinent reports.   and I have personally reviewed pertinent films in PACS    CT head wo contrast    Result Date: 1/9/2024  Impression: Improving subarachnoid hemorrhage compared to recent prior studies. No new findings. Other stable and nonemergent findings above. Workstation performed: [id number]     MRI brain w wo contrast    Result Date: 1/8/2024  Impression: Stable hematoma in the right CP angle, prepontine and premedullary cisterns. New subarachnoid hemorrhage over the convexities and within the lateral ventricles likely due to redistribution. Few tiny scattered recent infarcts in multiple vascular distributions. Study marked in epic for notification. Workstation performed: [id number]     MRI cervical spine w wo contrast    Result Date: 1/8/2024  Impression: No identifiable etiology for subarachnoid hemorrhage. Mild congenital canal stenosis. New marrow edema at C5-C6 with severe disc space narrowing that is similar to recent CT. Modic type I endplate degenerative changes favored over infection but recommend clinical correlation and follow-up imaging if warranted. Study marked in [place] for notification. Workstation performed: [id number]     CTA head and neck with and without contrast    Result Date: 1/5/2024  Impression: 1.  Small amount of subarachnoid hemorrhage in the right basilar cistern region is again seen without significant change since the prior CT brain exam of the same day. No enhancing brain mass/fluid collection or evidence of vascular malformation. Major intracranial dural venous sinuses are grossly patent. 2.  Mild scattered calcific atherosclerosis of the carotid bifurcation and cavernous carotid segments bilaterally without significant stenosis. Bilateral major cervical/intracranial arteries are patent with normal course and caliber. No arterial occlusion, significant flow-limiting stenosis, or focal saccular aneurysm identified 3.  Nonspecific 4  mm right upper lobe lung nodule is seen. No routine follow-up imaging recommended per current Fleischner Society guidelines for low risk patient. 4.  Opacification of the right mastoid air cells could be secondary to mastoid effusion or mastoiditis, recommend clinical correlation with ENT exam findings. Left mastoid air cells and paranasal sinuses appear well aerated and clear. Workstation performed: [id number]     CT head without contrast    Result Date: 1/5/2024  Impression: 1.  Right prepontine/premedullary cistern subarachnoid hemorrhage. Minimal mass effect on the right middle cerebellar peduncle. Short-term follow-up is recommended. 2.  Opacification of the right mastoid air cells correlate clinically for mastoiditis. I personally discussed this study with REYNALDO MULLER on 1/5/2024 3:43 AM. Workstation performed: [id number]       EKG, Pathology, and Other Studies: I have personally reviewed pertinent reports.      VTE Pharmacologic Prophylaxis: Sequential compression device (Venodyne)     VTE Mechanical Prophylaxis: sequential compression device

## 2024-01-17 NOTE — ARC ADMISSION
Referral received for consideration of patient for inpatient acute rehab. Will review patient's case with ARC physician and update CM as able.

## 2024-01-17 NOTE — ASSESSMENT & PLAN NOTE
Lab Results   Component Value Date    HGBA1C 6.0 (H) 01/14/2024       Recent Labs     01/16/24  1200 01/16/24  1813 01/17/24  0121 01/17/24  0620   POCGLU 137 196* 129 151*         Blood Sugar Average: Last 72 hrs:  (P) 160.4375    Continue management per primary team

## 2024-01-17 NOTE — PROGRESS NOTES
NEPHROLOGY PROGRESS NOTE   Mateus Mckeon 38 y.o. male MRN: [id number]  Unit/Bed#: [facility] 718-01 Encounter: [id number]  Reason for Consult: Management of ESRD    ASSESSMENT AND PLAN:  37 yo man with PMH of ESRD on HD at [facility] on, MWF p/w headaches.  Nephrology is consulted for management of ESRD     PLAN:     #ESRD on HD MWF:  Dialysis unit/days: [facility]  Access: AV fistula  On dialysis today, well-tolerated  Plan to continue as she has per schedule  Renal Diet  Fluid restriction 1-1.5L/d  Adjust medications to GFR<10  Avoid opioids      #Volume status/uncontrolled hypertension:  Volume: Fluid overload  Blood pressure: Hypertensive, /100, goal less than 140/90   BP regimen   clonidine switch to tid , patch dc  Hydralazine 100 3 times daily  Labetalol 800mg 3 times daily  Lisinopril 40 mg  Continue nifedipine 90 mg twice daily    Started on nitro overnight, discontinue for dialysis  Low-sodium   Increase UF on HD goal 3 to 4 L if tolerates     #Secondary Hyperparasitoidism   Cinacalcet 60mg   PhosLo with meals     # Anemia of Kidney Disease  Current hemoglobin: 8.1 mg/dL  Treatment:  Transfuse for hemoglobin less than 7.0 per primary service          # Subarachnoid hemorrhage  Management as per neurosurgery  Stable        #DM  HbA1c 6  Advised to maintain a good DM control to prevent progression of CKD   Maintain healthy diet (vegetables, fruits, whole grains, nonfat or low fat)  Weight loss  Physical activity (5 to 10 minutes to start the increase to 30 min a day)      # Subarachnoid hemorrhage  On ASA 81 mg  BP above goal despite of aggressive BP meds    HEMODIALYSIS PROCEDURE NOTE  The patient was seen and examined on hemodialysis. The patient is tolerating the procedure well.  Time: 3.5 hours  Sodium: 135 Blood flow: 400   Dialyzer: F160 Potassium: 2 Dialysate flow: 600   Access: AVF Bicarbonate: 35 Ultrafiltration goal: 3.5L   Medications on HD: none       SUBJECTIVE:  Patient seen and examined at  bedside on dialysis, well-tolerated.  No chest pain or shortness of breath.        OBJECTIVE:  Current Weight: Weight - Scale: 94.9 kg (209 lb 2.4 oz)  Vitals:    01/17/24 1130   BP: (!) 184/103   Pulse: 76   Resp: 16   Temp:    SpO2:        Intake/Output Summary (Last 24 hours) at 1/17/2024 1149  Last data filed at 1/17/2024 0800  Gross per 24 hour   Intake 421 ml   Output --   Net 421 ml     Wt Readings from Last 3 Encounters:   01/17/24 94.9 kg (209 lb 2.4 oz)   01/13/24 94.8 kg (209 lb)   01/04/24 95.3 kg (210 lb 1.6 oz)     Temp Readings from Last 3 Encounters:   01/17/24 98.2 °F (36.8 °C) (Oral)   01/04/24 97.8 °F (36.6 °C) (Oral)   01/03/24 97.8 °F (36.6 °C) (Oral)     BP Readings from Last 3 Encounters:   01/17/24 (!) 184/103   01/05/24 (!) 194/97   01/03/24 143/69     Pulse Readings from Last 3 Encounters:   01/17/24 76   01/05/24 80   01/03/24 72      General:  no acute distress at this time  Skin:  No acute rash  Eyes:  No scleral icterus and noninjected  ENT:  mucous membranes moist  Neck:  no carotid bruits  Chest:  Clear to auscultation percussion, good respiratory effort, no use of accessory respiratory muscles  CVS:  Regular rate and rhythm without rub   Abdomen:  soft and nontender   Extremities: lower extremity edema  Neuro:  No gross focality  Psych:  Alert , cooperative   Dialysis access: AV fistula    Medications:    Current Facility-Administered Medications:     aspirin chewable tablet 81 mg, 81 mg, Oral, Daily, Malu Moore PA-C, 81 mg at 01/16/24 0826    atorvastatin (LIPITOR) tablet 40 mg, 40 mg, Oral, QPM, Radha Steen MD, 40 mg at 01/16/24 1758    bisacodyl (DULCOLAX) rectal suppository 10 mg, 10 mg, Rectal, Daily PRN, Radha Steen MD    calcium acetate (PHOSLO) capsule 667 mg, 667 mg, Oral, TID With Meals, Radha Steen MD, 667 mg at 01/17/24 0737    cinacalcet (SENSIPAR) tablet 60 mg, 60 mg, Oral, Daily, Radha Steen MD, 60 mg at 01/16/24 0827    cloNIDine (CATAPRES) tablet 0.3 mg, 0.3 mg,  Oral, Q8H HALIE, Valeria Forrester PA-C, 0.3 mg at 01/17/24 0614    escitalopram (LEXAPRO) tablet 20 mg, 20 mg, Oral, HS, Bryan Claire MD    famotidine (PEPCID) tablet 40 mg, 40 mg, Oral, Daily, Radha Steen MD, 40 mg at 01/16/24 0827    gabapentin (NEURONTIN) capsule 300 mg, 300 mg, Oral, Daily, Radha Steen MD, 300 mg at 01/16/24 1509    hydrALAZINE (APRESOLINE) injection 20 mg, 20 mg, Intravenous, Q4H PRN, Court Edwards MD, 20 mg at 01/17/24 0224    hydrALAZINE (APRESOLINE) tablet 100 mg, 100 mg, Oral, TID, Valeria Forrester PA-C, 100 mg at 01/17/24 0738    insulin lispro (HumaLOG) FOR PUMP REFILLS 300 Units, 300 Units, Subcutaneous Insulin Pump, Daily PRN, Radha Steen MD    labetalol (NORMODYNE) injection 20 mg, 20 mg, Intravenous, Q4H PRN, Valeria Forrester PA-C, 20 mg at 01/17/24 0410    labetalol (NORMODYNE) tablet 800 mg, 800 mg, Oral, TID, Valeria Forrester PA-C, 800 mg at 01/17/24 0738    lisinopril (ZESTRIL) tablet 40 mg, 40 mg, Oral, Daily, Valeria Forrester PA-C    melatonin tablet 6 mg, 6 mg, Oral, HS PRN, Cinthia Dempsey MD    NIFEdipine (PROCARDIA XL) 24 hr tablet 90 mg, 90 mg, Oral, BID, Valeria Forrester PA-C, 90 mg at 01/17/24 0738    nitroGLYcerin (TRIDIL) 50 mg in 250 mL infusion (premix), 5-200 mcg/min, Intravenous, Titrated, Valeria Forrester PA-C, Held at 01/17/24 0743    ondansetron (ZOFRAN) injection 4 mg, 4 mg, Intravenous, Q4H PRN, Radha Steen MD, 4 mg at 01/14/24 1351    oxyCODONE (ROXICODONE) split tablet 2.5 mg, 2.5 mg, Oral, Q4H PRN, 2.5 mg at 01/11/24 2001 **OR** oxyCODONE (ROXICODONE) IR tablet 5 mg, 5 mg, Oral, Q4H PRN, Radha Steen MD, 5 mg at 01/16/24 1241    PATIENT MAINTAINED INSULIN PUMP 1 each, 1 each, Subcutaneous, Q8H, Radha Steen MD, 1 each at 01/17/24 0116    polyethylene glycol (MIRALAX) packet 17 g, 17 g, Oral, Daily, Radha Steen MD, 17 g at 01/14/24 0822    prochlorperazine (COMPAZINE) tablet 5 mg, 5 mg, Oral, Q6H PRN, Radha Steen MD, 5 mg at 01/06/24 1217    senna-docusate  sodium (SENOKOT S) 8.6-50 mg per tablet 1 tablet, 1 tablet, Oral, BID, Radha Steen MD, 1 tablet at 01/16/24 3592    Laboratory Results:  Results from last 7 days   Lab Units 01/17/24  0621 01/16/24  0525 01/15/24  0904 01/14/24  0604 01/13/24  0548 01/12/24  0623 01/11/24  0522   WBC Thousand/uL 12.49* 7.73 8.66 9.53 7.83 7.25  --    HEMOGLOBIN g/dL 8.1* 8.4* 8.9* 9.7* 9.5* 9.4*  --    HEMATOCRIT % 24.4* 25.3* 27.0* 28.6* 28.5* 28.4*  --    PLATELETS Thousands/uL 266 260 254 257 273 260  --    SODIUM mmol/L 136 138 132* 136 138 136 138   POTASSIUM mmol/L 4.9 4.7 5.1 4.5 4.3 4.4 4.1   CHLORIDE mmol/L 98 101 94* 97 98 97 99   CO2 mmol/L 27 28 25 23 27 26 28   BUN mg/dL 37* 28* 52* 39* 29* 37* 25   CREATININE mg/dL 10.79* 7.91* 11.46* 9.56* 7.07* 8.79* 6.69*   CALCIUM mg/dL 7.9* 8.0* 7.9* 7.9* 7.7* 8.3* 8.2*       VAS renal artery complete   Final Result by Placido Altamirano MD (01/16 1055)      MRI brain wo contrast   Final Result by Willis Huddleston MD (01/14 1311)         1. Crescendo multifocal acute/recent infarctions involving at least 3 discrete vascular territories (left posterior cerebral artery and bilateral middle cerebral artery territories), worrisome for multifocal new/interval embolic infarctions. Differential    diagnosis could include watershed infarctions in the appropriate clinical setting. These recent/acute infarctions are new/superimposed in the setting of previously present smaller foci of diffusion restriction, indicative of progressive/new interval    infarctions since 1/8/2024   2. 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 No significant mass effect.   3. White matter changes likely correlate to areas of recent infarction.   4. Right mastoid air cell effusion. Mastoiditis not excluded.      Workstation performed: [id number]         CTA head and neck w wo contrast   Final Result by Justin  Ansley Ho MD (01/13 1415)      CT brain:  Improved scattered subarachnoid hemorrhages (better delineated on the recent prior MRI examination) with no new intra or extra-axial hemorrhage.      CTA head: Negative for large vessel intracranial occlusion or hemodynamically significant stenosis.      CTA neck:  No extracranial carotid stenosis.  The cervical vertebral arteries are patent.         Workstation performed: [id number]         VAS transcranial doppler, complete study   Final Result by Dayne Burden DO (01/13 1147)      IR cerebral angiography   Final Result by Yanira Moss (01/15 1046)      VAS transcranial doppler, complete study   Final Result by Ashley Poe MD (01/11 1121)      VAS transcranial doppler, complete study   Final Result by Ashley Poe MD (01/10 9094)      CT head wo contrast   Final Result by Makenzie Calvin MD (01/09 1453)      Improving subarachnoid hemorrhage compared to recent prior studies. No new findings.      Other stable and nonemergent findings above.         Workstation performed: [id number]         VAS transcranial doppler, complete study   Final Result by Dennis Paul DO (01/09 1147)      VAS transcranial doppler, complete study   Final Result by Dennis Paul DO (01/08 1614)      MRI brain w wo contrast   Final Result by E. Alec Schoenberger, MD (01/08 0859)      Stable hematoma in the right CP angle, prepontine and premedullary cisterns. New subarachnoid hemorrhage over the convexities and within the lateral ventricles likely due to redistribution.   Few tiny scattered recent infarcts in multiple vascular distributions.      Study marked in epic for notification.      Workstation performed: [id number]         MRI cervical spine w wo contrast   Final Result by E. Alec Schoenberger, MD (01/08 0858)      No identifiable etiology for subarachnoid hemorrhage.   Mild congenital canal stenosis.   New marrow edema at C5-C6 with severe disc space narrowing that is similar  "to recent CT. Modic type I endplate degenerative changes favored over infection but recommend clinical correlation and follow-up imaging if warranted.      Study marked in epic for notification.      Workstation performed: [id number]         VAS transcranial doppler, complete study   Final Result by Priscila Chavez MD (01/07 1736)      VAS transcranial doppler, complete study   Final Result by Priscila Chavez MD (01/07 1736)      IR cerebral angiography / intervention   Final Result by Yanira Moss (01/08 1407)      VAS transcranial doppler, complete study   Final Result by Dayne Burden DO (01/05 1442)      MRA head wo contrast    (Results Pending)   MRI brain w wo contrast    (Results Pending)   VAS transcranial doppler, complete study    (Results Pending)       Portions of the record may have been created with voice recognition software. Occasional wrong word or \"sound a like\" substitutions may have occurred due to the inherent limitations of voice recognition software. Read the chart carefully and recognize, using context, where substitutions have occurred.    "

## 2024-01-17 NOTE — PLAN OF CARE
Post-Dialysis RN Treatment Note    Blood Pressure:  Pre:  165/92 mm/Hg  Post: 182/88 mmHg   EDW:  92.0 kg    Weight:  Pre:  94.6 kg   Post:  90.8 kg   Mode of weight measurement: Bed Scale   Volume Removed:   3800 ml    Treatment duration:  240 minutes    NS given:   No    Treatment shortened? No   Medications given during Rx:    None Reported   Estimated Kt/V:   Not Applicable   Access type: AV fistula   Access Issues: No    Report called to primary nurse:   [place],   Domen[place] via [employer]     Problem: METABOLIC, FLUID AND ELECTROLYTES - ADULT  Goal: Electrolytes maintained within normal limits  Description: INTERVENTIONS:  - Monitor labs and assess patient for signs and symptoms of electrolyte imbalances  - Administer electrolyte replacement as ordered  - Monitor response to electrolyte replacements, including repeat lab results as appropriate  - Instruct patient on fluid and nutrition as appropriate  Outcome: Progressing  Goal: Fluid balance maintained  Description: INTERVENTIONS:  - Monitor labs   - Monitor I/O and WT  - Instruct patient on fluid and nutrition as appropriate  - Assess for signs & symptoms of volume excess or deficit  Outcome: Progressing

## 2024-01-17 NOTE — ASSESSMENT & PLAN NOTE
"RUE weakness with mild facial droop 1/12-13  MRI brain \"Crescendo multifocal acute/recent infarctions involving at least 3 discrete vascular territories (left posterior cerebral artery and bilateral middle cerebral artery territories), worrisome for multifocal new/interval embolic infarctions\"  Continue neuro-checks  ASA 81 mg daily and atorvastatin 40mg daily.   Telemetry.  No events  Echo 1/5 no PFO  Cleared for neurology standpoint   Patient will need to Follow up with Neurosurgery in 6 weeks for MRI  PT/OT/PMR  "

## 2024-01-17 NOTE — ASSESSMENT & PLAN NOTE
Lab Results   Component Value Date    EGFR 5 01/17/2024    EGFR 7 01/16/2024    EGFR 4 01/15/2024    CREATININE 10.79 (H) 01/17/2024    CREATININE 7.91 (H) 01/16/2024    CREATININE 11.46 (H) 01/15/2024   hgb is stable  Continue to monitor   Dialysis MWF

## 2024-01-17 NOTE — ASSESSMENT & PLAN NOTE
Lab Results   Component Value Date    HGBA1C 6.0 (H) 01/14/2024       Recent Labs     01/16/24  1200 01/16/24  1813 01/17/24  0121 01/17/24  0620   POCGLU 137 196* 129 151*     Managed with insulin pump.    Blood Sugar Average: Last 72 hrs:  (P) 160.4375    Blood glucose very well controlled  Continue using pump  Continue to monitor

## 2024-01-17 NOTE — ASSESSMENT & PLAN NOTE
Lab Results   Component Value Date    EGFR 5 01/17/2024    EGFR 7 01/16/2024    EGFR 4 01/15/2024    CREATININE 10.79 (H) 01/17/2024    CREATININE 7.91 (H) 01/16/2024    CREATININE 11.46 (H) 01/15/2024     On HD (MWF)

## 2024-01-18 LAB
ALBUMIN SERPL BCP-MCNC: 3.2 G/DL (ref 3.5–5)
ALP SERPL-CCNC: 68 U/L (ref 34–104)
ALT SERPL W P-5'-P-CCNC: 10 U/L (ref 7–52)
ANION GAP SERPL CALCULATED.3IONS-SCNC: 8 MMOL/L
AST SERPL W P-5'-P-CCNC: 14 U/L (ref 13–39)
BASOPHILS # BLD AUTO: 0.07 THOUSANDS/ÂΜL (ref 0–0.1)
BASOPHILS NFR BLD AUTO: 1 % (ref 0–1)
BILIRUB SERPL-MCNC: 0.46 MG/DL (ref 0.2–1)
BUN SERPL-MCNC: 22 MG/DL (ref 5–25)
CALCIUM ALBUM COR SERPL-MCNC: 8.5 MG/DL (ref 8.3–10.1)
CALCIUM SERPL-MCNC: 7.9 MG/DL (ref 8.4–10.2)
CHLORIDE SERPL-SCNC: 99 MMOL/L (ref 96–108)
CO2 SERPL-SCNC: 32 MMOL/L (ref 21–32)
CREAT SERPL-MCNC: 7.06 MG/DL (ref 0.6–1.3)
EOSINOPHIL # BLD AUTO: 0.31 THOUSAND/ÂΜL (ref 0–0.61)
EOSINOPHIL NFR BLD AUTO: 4 % (ref 0–6)
ERYTHROCYTE [DISTWIDTH] IN BLOOD BY AUTOMATED COUNT: 14.4 % (ref 11.6–15.1)
GFR SERPL CREATININE-BSD FRML MDRD: 8 ML/MIN/1.73SQ M
GLUCOSE SERPL-MCNC: 110 MG/DL (ref 65–140)
GLUCOSE SERPL-MCNC: 128 MG/DL (ref 65–140)
GLUCOSE SERPL-MCNC: 144 MG/DL (ref 65–140)
GLUCOSE SERPL-MCNC: 146 MG/DL (ref 65–140)
GLUCOSE SERPL-MCNC: 156 MG/DL (ref 65–140)
HCT VFR BLD AUTO: 23.9 % (ref 36.5–49.3)
HGB BLD-MCNC: 7.7 G/DL (ref 12–17)
IMM GRANULOCYTES # BLD AUTO: 0.05 THOUSAND/UL (ref 0–0.2)
IMM GRANULOCYTES NFR BLD AUTO: 1 % (ref 0–2)
LYMPHOCYTES # BLD AUTO: 1.49 THOUSANDS/ÂΜL (ref 0.6–4.47)
LYMPHOCYTES NFR BLD AUTO: 21 % (ref 14–44)
MCH RBC QN AUTO: 31.4 PG (ref 26.8–34.3)
MCHC RBC AUTO-ENTMCNC: 32.2 G/DL (ref 31.4–37.4)
MCV RBC AUTO: 98 FL (ref 82–98)
MONOCYTES # BLD AUTO: 0.76 THOUSAND/ÂΜL (ref 0.17–1.22)
MONOCYTES NFR BLD AUTO: 11 % (ref 4–12)
NEUTROPHILS # BLD AUTO: 4.57 THOUSANDS/ÂΜL (ref 1.85–7.62)
NEUTS SEG NFR BLD AUTO: 62 % (ref 43–75)
NRBC BLD AUTO-RTO: 0 /100 WBCS
PLATELET # BLD AUTO: 242 THOUSANDS/UL (ref 149–390)
PMV BLD AUTO: 10.9 FL (ref 8.9–12.7)
POTASSIUM SERPL-SCNC: 4.2 MMOL/L (ref 3.5–5.3)
PROT SERPL-MCNC: 4.6 G/DL (ref 6.4–8.4)
RBC # BLD AUTO: 2.45 MILLION/UL (ref 3.88–5.62)
SODIUM SERPL-SCNC: 139 MMOL/L (ref 135–147)
WBC # BLD AUTO: 7.25 THOUSAND/UL (ref 4.31–10.16)

## 2024-01-18 PROCEDURE — 99232 SBSQ HOSP IP/OBS MODERATE 35: CPT | Performed by: PHYSICIAN ASSISTANT

## 2024-01-18 PROCEDURE — 97112 NEUROMUSCULAR REEDUCATION: CPT

## 2024-01-18 PROCEDURE — 99232 SBSQ HOSP IP/OBS MODERATE 35: CPT | Performed by: FAMILY MEDICINE

## 2024-01-18 PROCEDURE — 99232 SBSQ HOSP IP/OBS MODERATE 35: CPT | Performed by: STUDENT IN AN ORGANIZED HEALTH CARE EDUCATION/TRAINING PROGRAM

## 2024-01-18 PROCEDURE — 97116 GAIT TRAINING THERAPY: CPT

## 2024-01-18 PROCEDURE — 85025 COMPLETE CBC W/AUTO DIFF WBC: CPT | Performed by: FAMILY MEDICINE

## 2024-01-18 PROCEDURE — 82948 REAGENT STRIP/BLOOD GLUCOSE: CPT

## 2024-01-18 PROCEDURE — 80053 COMPREHEN METABOLIC PANEL: CPT | Performed by: FAMILY MEDICINE

## 2024-01-18 PROCEDURE — 97535 SELF CARE MNGMENT TRAINING: CPT

## 2024-01-18 RX ORDER — ONDANSETRON 2 MG/ML
4 INJECTION INTRAMUSCULAR; INTRAVENOUS ONCE AS NEEDED
Status: DISCONTINUED | OUTPATIENT
Start: 2024-01-18 | End: 2024-01-18

## 2024-01-18 RX ORDER — FENTANYL CITRATE/PF 50 MCG/ML
25 SYRINGE (ML) INJECTION
Status: DISCONTINUED | OUTPATIENT
Start: 2024-01-18 | End: 2024-01-23 | Stop reason: HOSPADM

## 2024-01-18 RX ORDER — PROCHLORPERAZINE MALEATE 5 MG/1
5 TABLET ORAL EVERY 6 HOURS PRN
Status: DISCONTINUED | OUTPATIENT
Start: 2024-01-18 | End: 2024-01-18

## 2024-01-18 RX ORDER — HEPARIN SODIUM 5000 [USP'U]/ML
5000 INJECTION, SOLUTION INTRAVENOUS; SUBCUTANEOUS EVERY 8 HOURS SCHEDULED
Status: DISCONTINUED | OUTPATIENT
Start: 2024-01-18 | End: 2024-01-23 | Stop reason: HOSPADM

## 2024-01-18 RX ORDER — PROCHLORPERAZINE MALEATE 5 MG/1
5 TABLET ORAL EVERY 6 HOURS PRN
Status: DISCONTINUED | OUTPATIENT
Start: 2024-01-18 | End: 2024-01-23 | Stop reason: HOSPADM

## 2024-01-18 RX ADMIN — ESCITALOPRAM OXALATE 20 MG: 20 TABLET ORAL at 21:15

## 2024-01-18 RX ADMIN — CLONIDINE HYDROCHLORIDE 0.3 MG: 0.2 TABLET ORAL at 05:54

## 2024-01-18 RX ADMIN — CALCIUM ACETATE 667 MG: 667 CAPSULE ORAL at 08:11

## 2024-01-18 RX ADMIN — LABETALOL HYDROCHLORIDE 800 MG: 200 TABLET, FILM COATED ORAL at 15:12

## 2024-01-18 RX ADMIN — OXYCODONE HYDROCHLORIDE 5 MG: 5 TABLET ORAL at 17:21

## 2024-01-18 RX ADMIN — CALCIUM ACETATE 667 MG: 667 CAPSULE ORAL at 17:05

## 2024-01-18 RX ADMIN — HYDRALAZINE HYDROCHLORIDE 20 MG: 20 INJECTION, SOLUTION INTRAMUSCULAR; INTRAVENOUS at 12:30

## 2024-01-18 RX ADMIN — LISINOPRIL 40 MG: 20 TABLET ORAL at 08:09

## 2024-01-18 RX ADMIN — NITROGLYCERIN 70 MCG/MIN: 20 INJECTION INTRAVENOUS at 04:53

## 2024-01-18 RX ADMIN — ATORVASTATIN CALCIUM 40 MG: 40 TABLET, FILM COATED ORAL at 17:05

## 2024-01-18 RX ADMIN — HEPARIN SODIUM 5000 UNITS: 5000 INJECTION INTRAVENOUS; SUBCUTANEOUS at 09:01

## 2024-01-18 RX ADMIN — LABETALOL HYDROCHLORIDE 800 MG: 200 TABLET, FILM COATED ORAL at 20:02

## 2024-01-18 RX ADMIN — FAMOTIDINE 40 MG: 20 TABLET, FILM COATED ORAL at 08:08

## 2024-01-18 RX ADMIN — HEPARIN SODIUM 5000 UNITS: 5000 INJECTION INTRAVENOUS; SUBCUTANEOUS at 21:15

## 2024-01-18 RX ADMIN — GABAPENTIN 300 MG: 300 CAPSULE ORAL at 14:06

## 2024-01-18 RX ADMIN — LABETALOL HYDROCHLORIDE 800 MG: 200 TABLET, FILM COATED ORAL at 08:09

## 2024-01-18 RX ADMIN — CLONIDINE HYDROCHLORIDE 0.3 MG: 0.2 TABLET ORAL at 14:06

## 2024-01-18 RX ADMIN — POLYETHYLENE GLYCOL 3350 17 G: 17 POWDER, FOR SOLUTION ORAL at 08:12

## 2024-01-18 RX ADMIN — NIFEDIPINE 90 MG: 30 TABLET, FILM COATED, EXTENDED RELEASE ORAL at 08:08

## 2024-01-18 RX ADMIN — HYDRALAZINE HYDROCHLORIDE 100 MG: 50 TABLET ORAL at 20:01

## 2024-01-18 RX ADMIN — SENNOSIDES, DOCUSATE SODIUM 1 TABLET: 8.6; 5 TABLET ORAL at 17:05

## 2024-01-18 RX ADMIN — CALCIUM ACETATE 667 MG: 667 CAPSULE ORAL at 12:28

## 2024-01-18 RX ADMIN — CINACALCET 60 MG: 30 TABLET ORAL at 08:08

## 2024-01-18 RX ADMIN — HYDRALAZINE HYDROCHLORIDE 100 MG: 50 TABLET ORAL at 08:10

## 2024-01-18 RX ADMIN — CLONIDINE HYDROCHLORIDE 0.3 MG: 0.2 TABLET ORAL at 21:15

## 2024-01-18 RX ADMIN — HEPARIN SODIUM 5000 UNITS: 5000 INJECTION INTRAVENOUS; SUBCUTANEOUS at 14:06

## 2024-01-18 RX ADMIN — HYDRALAZINE HYDROCHLORIDE 100 MG: 50 TABLET ORAL at 15:12

## 2024-01-18 RX ADMIN — SENNOSIDES, DOCUSATE SODIUM 1 TABLET: 8.6; 5 TABLET ORAL at 08:08

## 2024-01-18 RX ADMIN — NIFEDIPINE 90 MG: 30 TABLET, FILM COATED, EXTENDED RELEASE ORAL at 20:01

## 2024-01-18 RX ADMIN — ASPIRIN 81 MG CHEWABLE TABLET 81 MG: 81 TABLET CHEWABLE at 08:08

## 2024-01-18 NOTE — PROGRESS NOTES
NEPHROLOGY PROGRESS NOTE   Mateus Mckeon 38 y.o. male MRN: [id number]  [facility] Encounter: [id number]  Reason for Consult: Management of ESRD    ASSESSMENT AND PLAN:  37 yo man with PMH of ESRD on HD at [facility] on, MWF p/w headaches.  Nephrology is consulted for management of ESRD     PLAN:     #ESRD on HD MWF:  Dialysis unit/days: [facility]  Access: AV fistula  Had dialysis yesterday, which challenge weight.  UF3.8L  Plan to attempt more fluid removal on dialysis tomorrow  Fluid restriction 1-1.5L/d  Adjust medications to GFR<10  Avoid opioids      #Volume status/uncontrolled hypertension:  Volume hypervolemic  Blood pressure: Still hypertensive but better controlled, /82, goal less than 140/90   BP regimen   clonidine switch to tid   Hydralazine 100 3 times daily  Labetalol 800mg 3 times daily  Lisinopril 40 mg  Continue nifedipine 90 mg twice daily    On nitro, plan is to titrate down   Low-sodium   Plan for more UF on HD tomorrow      #Secondary Hyperparasitoidism   Cinacalcet 60mg   PhosLo with meals     # Anemia of Kidney Disease  Current hemoglobin: 7.7 mg/dL  Treatment:  Transfuse for hemoglobin less than 7.0 per primary service        # Subarachnoid hemorrhage  Management as per neurosurgery  Stable        #DM  HbA1c 6  Advised to maintain a good DM control to prevent progression of CKD   Maintain healthy diet (vegetables, fruits, whole grains, nonfat or low fat)  Weight loss  Physical activity (5 to 10 minutes to start the increase to 30 min a day)       # Subarachnoid hemorrhage  On ASA 81 mg  BP above goal despite of aggressive BP meds       SUBJECTIVE:  Patient seen and examined at bedside. No chest pain, shortness of breath.  Had dialysis yesterday, well-tolerated    OBJECTIVE:  Current Weight: Weight - Scale: 94.9 kg (209 lb 2.4 oz)  Vitals:    01/18/24 1015   BP: 150/82   Pulse: 72   Resp:    Temp:    SpO2:        Intake/Output Summary (Last 24 hours) at 1/18/2024  1205  Last data filed at 1/17/2024 1230  Gross per 24 hour   Intake 320 ml   Output --   Net 320 ml     Wt Readings from Last 3 Encounters:   01/17/24 94.9 kg (209 lb 2.4 oz)   01/13/24 94.8 kg (209 lb)   01/04/24 95.3 kg (210 lb 1.6 oz)     Temp Readings from Last 3 Encounters:   01/17/24 98.5 °F (36.9 °C) (Axillary)   01/04/24 97.8 °F (36.6 °C) (Oral)   01/03/24 97.8 °F (36.6 °C) (Oral)     BP Readings from Last 3 Encounters:   01/18/24 150/82   01/05/24 (!) 194/97   01/03/24 143/69     Pulse Readings from Last 3 Encounters:   01/18/24 72   01/05/24 80   01/03/24 72        General:  no acute distress at this time  Skin:  No acute rash  Eyes:  No scleral icterus and noninjected  ENT:  mucous membranes moist  Neck:  no carotid bruits  Chest:  Clear to auscultation percussion, good respiratory effort, no use of accessory respiratory muscles  CVS:  Regular rate and rhythm without rub   Abdomen:  soft and nontender   Extremities: lower extremity edema  Neuro:  No gross focality  Psych:  Alert , cooperative   Dialysis access: AV fistula      Medications:    Current Facility-Administered Medications:     aspirin chewable tablet 81 mg, 81 mg, Oral, Daily, Malu Moore PA-C, 81 mg at 01/18/24 0808    atorvastatin (LIPITOR) tablet 40 mg, 40 mg, Oral, QPM, Radha Steen MD, 40 mg at 01/17/24 1715    bisacodyl (DULCOLAX) rectal suppository 10 mg, 10 mg, Rectal, Daily PRN, Radha Steen MD    calcium acetate (PHOSLO) capsule 667 mg, 667 mg, Oral, TID With Meals, Radha Steen MD, 667 mg at 01/18/24 0811    cinacalcet (SENSIPAR) tablet 60 mg, 60 mg, Oral, Daily, Radha Steen MD, 60 mg at 01/18/24 0808    cloNIDine (CATAPRES) tablet 0.3 mg, 0.3 mg, Oral, Q8H [facility], Valeria Forrester PA-C, 0.3 mg at 01/18/24 0554    escitalopram (LEXAPRO) tablet 20 mg, 20 mg, Oral, HS, Bryan Claire MD, 20 mg at 01/17/24 2118    famotidine (PEPCID) tablet 40 mg, 40 mg, Oral, Daily, Radha Steen MD, 40 mg at 01/18/24 0808    fentaNYL (SUBLIMAZE) injection 25  mcg, 25 mcg, Intravenous, Q3 min PRN, Elizabeth Martinez CRNA    gabapentin (NEURONTIN) capsule 300 mg, 300 mg, Oral, Daily, Radha Steen MD, 300 mg at 01/17/24 1256    heparin (porcine) subcutaneous injection 5,000 Units, 5,000 Units, Subcutaneous, Q8H [facility], Bryan Claire MD, 5,000 Units at 01/18/24 0901    hydrALAZINE (APRESOLINE) injection 20 mg, 20 mg, Intravenous, Q4H PRN, Court Edwards MD, 20 mg at 01/17/24 0224    hydrALAZINE (APRESOLINE) tablet 100 mg, 100 mg, Oral, TID, Valeria Forrester PA-C, 100 mg at 01/18/24 0810    insulin lispro (HumaLOG) FOR PUMP REFILLS 300 Units, 300 Units, Subcutaneous Insulin Pump, Daily PRN, Radha Steen MD    labetalol (NORMODYNE) injection 20 mg, 20 mg, Intravenous, Q4H PRN, Valeria Forrester PA-C, 20 mg at 01/17/24 0410    labetalol (NORMODYNE) tablet 800 mg, 800 mg, Oral, TID, Valeria Forrester PA-C, 800 mg at 01/18/24 0809    lisinopril (ZESTRIL) tablet 40 mg, 40 mg, Oral, Daily, Valeria Forrester PA-C, 40 mg at 01/18/24 0809    melatonin tablet 6 mg, 6 mg, Oral, HS PRN, Cinthia Dempsey MD    NIFEdipine (PROCARDIA XL) 24 hr tablet 90 mg, 90 mg, Oral, BID, Valeria Forrester PA-C, 90 mg at 01/18/24 0808    nitroGLYcerin (TRIDIL) 50 mg in 250 mL infusion (premix), 5-200 mcg/min, Intravenous, Titrated, Valeria Forrester PA-C, Last Rate: 12 mL/hr at 01/18/24 1147, 40 mcg/min at 01/18/24 1147    ondansetron (ZOFRAN) injection 4 mg, 4 mg, Intravenous, Q4H PRN, Radha Steen MD, 4 mg at 01/14/24 1351    ondansetron (ZOFRAN) injection 4 mg, 4 mg, Intravenous, Once PRN, Elizabeth Martinez CRNA    oxyCODONE (ROXICODONE) split tablet 2.5 mg, 2.5 mg, Oral, Q4H PRN, 2.5 mg at 01/11/24 2001 **OR** oxyCODONE (ROXICODONE) IR tablet 5 mg, 5 mg, Oral, Q4H PRN, Radha Steen MD, 5 mg at 01/16/24 1241    PATIENT MAINTAINED INSULIN PUMP 1 each, 1 each, Subcutaneous, Q8H, Radha Steen MD, 1.15 each at 01/18/24 0100    polyethylene glycol (MIRALAX) packet 17 g, 17 g, Oral, Daily, Radha Steen MD, 17 g at  01/18/24 0812    prochlorperazine (COMPAZINE) tablet 5 mg, 5 mg, Oral, Q6H PRN, Radha Steen MD    senna-docusate sodium (SENOKOT S) 8.6-50 mg per tablet 1 tablet, 1 tablet, Oral, BID, Radha Steen MD, 1 tablet at 01/18/24 0808    Laboratory Results:  Results from last 7 days   Lab Units 01/18/24  0558 01/17/24  0621 01/16/24  0525 01/15/24  0904 01/14/24  0604 01/13/24  0548 01/12/24  0623   WBC Thousand/uL 7.25 12.49* 7.73 8.66 9.53 7.83 7.25   HEMOGLOBIN g/dL 7.7* 8.1* 8.4* 8.9* 9.7* 9.5* 9.4*   HEMATOCRIT % 23.9* 24.4* 25.3* 27.0* 28.6* 28.5* 28.4*   PLATELETS Thousands/uL 242 266 260 254 257 273 260   SODIUM mmol/L 139 136 138 132* 136 138 136   POTASSIUM mmol/L 4.2 4.9 4.7 5.1 4.5 4.3 4.4   CHLORIDE mmol/L 99 98 101 94* 97 98 97   CO2 mmol/L 32 27 28 25 23 27 26   BUN mg/dL 22 37* 28* 52* 39* 29* 37*   CREATININE mg/dL 7.06* 10.79* 7.91* 11.46* 9.56* 7.07* 8.79*   CALCIUM mg/dL 7.9* 7.9* 8.0* 7.9* 7.9* 7.7* 8.3*       VAS renal artery complete   Final Result by Placido Altamirano MD (01/16 1055)      MRI brain wo contrast   Final Result by Willis Huddleston MD (01/14 1311)         1. Crescendo multifocal acute/recent infarctions involving at least 3 discrete vascular territories (left posterior cerebral artery and bilateral middle cerebral artery territories), worrisome for multifocal new/interval embolic infarctions. Differential    diagnosis could include watershed infarctions in the appropriate clinical setting. These recent/acute infarctions are new/superimposed in the setting of previously present smaller foci of diffusion restriction, indicative of progressive/new interval    infarctions since 1/8/2024   2. 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 No significant mass effect.   3. White matter changes likely correlate to areas of recent infarction.   4. Right mastoid air cell effusion. Mastoiditis not  excluded.      Workstation performed: [id number]         CTA head and neck w wo contrast   Final Result by Justin Ho MD (01/13 1415)      CT brain:  Improved scattered subarachnoid hemorrhages (better delineated on the recent prior MRI examination) with no new intra or extra-axial hemorrhage.      CTA head: Negative for large vessel intracranial occlusion or hemodynamically significant stenosis.      CTA neck:  No extracranial carotid stenosis.  The cervical vertebral arteries are patent.         Workstation performed: [id number]         VAS transcranial doppler, complete study   Final Result by Dayne Burden DO (01/13 1147)      IR cerebral angiography   Final Result by Yanira Moss (01/15 1046)      VAS transcranial doppler, complete study   Final Result by Ashley Poe MD (01/11 1121)      VAS transcranial doppler, complete study   Final Result by Ashley Poe MD (01/10 3744)      CT head wo contrast   Final Result by Makenzie Calvin MD (01/09 6073)      Improving subarachnoid hemorrhage compared to recent prior studies. No new findings.      Other stable and nonemergent findings above.         Workstation performed: [id number]         VAS transcranial doppler, complete study   Final Result by Dennis Paul DO (01/09 1147)      VAS transcranial doppler, complete study   Final Result by Dennis Paul DO (01/08 1614)      MRI brain w wo contrast   Final Result by E. Alec Schoenberger, MD (01/08 0859)      Stable hematoma in the right CP angle, prepontine and premedullary cisterns. New subarachnoid hemorrhage over the convexities and within the lateral ventricles likely due to redistribution.   Few tiny scattered recent infarcts in multiple vascular distributions.      Study marked in epic for notification.      Workstation performed: [id number]         MRI cervical spine w wo contrast   Final Result by E. Alec Schoenberger, MD (01/08 0858)      No identifiable etiology for subarachnoid  "hemorrhage.   Mild congenital canal stenosis.   New marrow edema at C5-C6 with severe disc space narrowing that is similar to recent CT. Modic type I endplate degenerative changes favored over infection but recommend clinical correlation and follow-up imaging if warranted.      Study marked in epic for notification.      Workstation performed: [id number]         VAS transcranial doppler, complete study   Final Result by Priscila Chavez MD (01/07 1736)      VAS transcranial doppler, complete study   Final Result by Priscila Chavez MD (01/07 1736)      IR cerebral angiography / intervention   Final Result by Yanira Moss (01/08 9357)      VAS transcranial doppler, complete study   Final Result by Dayne Burden DO (01/05 1442)      MRA head wo contrast    (Results Pending)   MRI brain w wo contrast    (Results Pending)   VAS transcranial doppler, complete study    (Results Pending)       Portions of the record may have been created with voice recognition software. Occasional wrong word or \"sound a like\" substitutions may have occurred due to the inherent limitations of voice recognition software. Read the chart carefully and recognize, using context, where substitutions have occurred.    "

## 2024-01-18 NOTE — PLAN OF CARE
Problem: PHYSICAL THERAPY ADULT  Goal: Performs mobility at highest level of function for planned discharge setting.  See evaluation for individualized goals.  Description: Treatment/Interventions: Functional transfer training, LE strengthening/ROM, Therapeutic exercise, Endurance training, Elevations, Cognitive reorientation, Patient/family training, Equipment eval/education, Bed mobility, Gait training, Spoke to nursing, Spoke to case management, OT, Family          See flowsheet documentation for full assessment, interventions and recommendations.  Outcome: Progressing  Note: Prognosis: Fair  Problem List: Decreased strength, Decreased endurance, Impaired balance, Decreased coordination, Decreased mobility, Decreased cognition, Impaired sensation  Assessment: Pt agreeable to participate in PT session. Pt performed functional mobility and therex as outlined above. Unable to move R distal UE on command but functionally light  on rollator and handrail. Improved ambulation distance with less A when utilizing rollator and more unsteady without AD. Fatigues easily and requires seated rest breaks throughout. Progressing towards goals. Pt left seated in chair with chair alarm, call bell, phone, and all personal needs within reach. Pt will continue to benefit from skilled acute care PT to further address their functional mobility limitations.  Barriers to Discharge: Inaccessible home environment, Decreased caregiver support     Rehab Resource Intensity Level, PT: I (Maximum Resource Intensity)    See flowsheet documentation for full assessment.

## 2024-01-18 NOTE — PROGRESS NOTES
[facility]  Progress Note  Name: Mateus Mckeon I  MRN: [id number]  Unit/Bed#: [facility] 718-01 I Date of Admission: 1/5/2024   Date of Service: 1/18/2024 I Hospital Day: 13    Assessment/Plan   Subarachnoid hemorrhage (HCC)  Assessment & Plan  Right basilar cistern SAH of unclear etiology  BD 19, HH 2, MF 3  S/p Cerebral angiography x 2 - both negative for source of hemorrhage (Dr. Mittal, 1/5/2024, 1/12/2024)  Presented on 1/4/2024 with MONTILLA that started on 12/31/23  Hx ASA use for hx multiple strokes, reversed with DDAVP on arrival  MRIs negative for source of SAH; small scattered acute/subacute infarcts noted.   Pt on hemodialysis for CKD.   1/12-1/13 started with new hand weakness and facial droop - multiple new areas of embolic infarcts to left PCA and bilateral MCA.    Imaging:  MRI brain wo, 1/14/2023: 1. Crescendo multifocal acute/recent infarctions involving at least 3 discrete vascular territories (left posterior cerebral artery and bilateral middle cerebral artery territories), worrisome for multifocal new/interval embolic infarctions. Differential diagnosis could include watershed infarctions in the appropriate clinical setting. These recent/acute infarctions are new/superimposed in the setting of previously present smaller foci of diffusion restriction, indicative of progressive/new interval infarctions since 1/8/2024 2. 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 No significant mass effect. 3. White matter changes likely correlate to areas of recent infarction. 4. Right mastoid air cell effusion. Mastoiditis not excluded.    Plan:  Continue to monitor neuro exam closely.    GCS 15 with RUE weakness and ataxia, right facial weakness.  STAT CTA with decline in GCS > 2 pts in 1 hour.  Maintain SBP < 160 mmHg, continues to have hypertensive episodes overnight.  Pt with  "hx of strokes. Continue ASA 81mg.   Mobilize as tolerated.  Disposition: requesting ARC secondary to new strokes.   PM&R consult appreciated. Needs to be able to complete 3h of therapy a day, currently limited 2/2 BP control  DVT ppx: SCD's, can resume dvt ppx at this time from nsx standpoint    Neurosurgery is cleared to dc when medically appropriate to accepting rehab, will follow along in the interim.   Will plan for follow up in 6 weeks time with repeat MRI brain w wo contrast and MRA head wo contrast. Call with questions.      Hypertensive emergency  Assessment & Plan  Per nephrology and medicine  Increased nifedipine 90mg PO BID (max dose), remainder regimen continued  Currently on nitro gtt  Goal SBP < 160    End stage kidney disease (HCC)  Assessment & Plan  Lab Results   Component Value Date    EGFR 8 01/18/2024    EGFR 5 01/17/2024    EGFR 7 01/16/2024    CREATININE 7.06 (H) 01/18/2024    CREATININE 10.79 (H) 01/17/2024    CREATININE 7.91 (H) 01/16/2024     On HD (MWF)    Type 1 diabetes mellitus (HCC)  Assessment & Plan  Lab Results   Component Value Date    HGBA1C 6.0 (H) 01/14/2024       Recent Labs     01/17/24  1249 01/17/24  1809 01/18/24  0009 01/18/24  0600   POCGLU 129 183* 144* 110         Blood Sugar Average: Last 72 hrs:  (P) 147.5    Continue management per primary team    * Acute CVA (cerebrovascular accident) (HCC)  Assessment & Plan  Neurology following  Started with new onset right hand weakness and right facial droop 1/12-1/13 concerning for new stroke  MRI brain confirms new multifocal infarcts  CTA head and neck without any acute findings  Asa has been resumed 1/12         Subjective/Objective   Chief Complaint: \"I am okay\"     Subjective: patient states he is okay today.  Continues with right arm weakness and ataxia, worse in the hand.  Wonders when and where he will be going for rehab.    Objective:  sitting up in bed, NAD    I/O         01/16 0701  01/17 0700 01/17 0701 01/18 0700 " "01/18 0701  01/19 0700    P.O. 221      I.V. (mL/kg)  520 (5.5)     Total Intake(mL/kg) 221 (2.3) 520 (5.5)     Other  4500     Total Output  4500     Net +221 -3980                    Invasive Devices       Peripheral Intravenous Line  Duration             Peripheral IV 01/17/24 Right;Ventral (anterior) Forearm 1 day              Line  Duration             Hemodialysis AV Fistula 11/09/20 Left Other (Comment) 1164 days                    Physical Exam:  Vitals: Blood pressure 158/96, pulse 71, temperature 98.5 °F (36.9 °C), temperature source Axillary, resp. rate 18, height 5' 5\" (1.651 m), weight 94.9 kg (209 lb 2.4 oz), SpO2 95%.,Body mass index is 34.8 kg/m².      General appearance: alert, appears stated age, cooperative and no distress  Head: Normocephalic, without obvious abnormality, atraumatic  Eyes: EOMI  Neck: supple, symmetrical, trachea midline   Lungs: non labored breathing  Heart: regular heart rate  Neurologic:   Mental status: Alert, oriented x3, follows commands, thought content appropriate  Cranial nerves: grossly intact (Cranial nerves II-XII) - right NLF drooping mostly corrected with smiling  Motor: moving all extremities with full strength except for RUE which is ataxic, 0/5 , 3/5 WE/WF, 4+/5 bicep/tricep/deltoid  Coordination:  RUE ataxia      Lab Results:  Results from last 7 days   Lab Units 01/18/24 0558 01/17/24  0621 01/16/24  0525 01/15/24  0904   WBC Thousand/uL 7.25 12.49* 7.73 8.66   HEMOGLOBIN g/dL 7.7* 8.1* 8.4* 8.9*   HEMATOCRIT % 23.9* 24.4* 25.3* 27.0*   PLATELETS Thousands/uL 242 266 260 254   NEUTROS PCT % 62 75  --  67   MONOS PCT % 11 8  --  8   EOS PCT % 4 4  --  7*     Results from last 7 days   Lab Units 01/18/24 0558 01/17/24 0621 01/16/24  0525   SODIUM mmol/L 139 136 138   POTASSIUM mmol/L 4.2 4.9 4.7   CHLORIDE mmol/L 99 98 101   CO2 mmol/L 32 27 28   BUN mg/dL 22 37* 28*   CREATININE mg/dL 7.06* 10.79* 7.91*   CALCIUM mg/dL 7.9* 7.9* 8.0*   ALK PHOS U/L 68 74  " "--    ALT U/L 10 13  --    AST U/L 14 14  --              Results from last 7 days   Lab Units 01/12/24  0623   INR  1.05   PTT seconds 30     No results found for: \"TROPONINT\"  ABG:  Lab Results   Component Value Date    PHART 7.454 (H) 02/21/2020    BVI2AMJ 33.2 (L) 02/21/2020    PO2ART 170.0 (H) 02/21/2020    BXN0UAW 22.8 02/21/2020    BEART -0.8 02/21/2020    SOURCE Radial, Right 02/21/2020       Imaging Studies: I have personally reviewed pertinent reports.   and I have personally reviewed pertinent films in PACS    CT head wo contrast    Result Date: 1/9/2024  Impression: Improving subarachnoid hemorrhage compared to recent prior studies. No new findings. Other stable and nonemergent findings above. Workstation performed: [id number]     MRI brain w wo contrast    Result Date: 1/8/2024  Impression: Stable hematoma in the right CP angle, prepontine and premedullary cisterns. New subarachnoid hemorrhage over the convexities and within the lateral ventricles likely due to redistribution. Few tiny scattered recent infarcts in multiple vascular distributions. Study marked in epic for notification. Workstation performed: [id number]     MRI cervical spine w wo contrast    Result Date: 1/8/2024  Impression: No identifiable etiology for subarachnoid hemorrhage. Mild congenital canal stenosis. New marrow edema at C5-C6 with severe disc space narrowing that is similar to recent CT. Modic type I endplate degenerative changes favored over infection but recommend clinical correlation and follow-up imaging if warranted. Study marked in "[employer]" for notification. Workstation performed: [id number]     CTA head and neck with and without contrast    Result Date: 1/5/2024  Impression: 1.  Small amount of subarachnoid hemorrhage in the right basilar cistern region is again seen without significant change since the prior CT brain exam of the same day. No enhancing brain mass/fluid collection or evidence of vascular malformation. Major " intracranial dural venous sinuses are grossly patent. 2.  Mild scattered calcific atherosclerosis of the carotid bifurcation and cavernous carotid segments bilaterally without significant stenosis. Bilateral major cervical/intracranial arteries are patent with normal course and caliber. No arterial occlusion, significant flow-limiting stenosis, or focal saccular aneurysm identified 3.  Nonspecific 4 mm right upper lobe lung nodule is seen. No routine follow-up imaging recommended per current Fleischner Society guidelines for low risk patient. 4.  Opacification of the right mastoid air cells could be secondary to mastoid effusion or mastoiditis, recommend clinical correlation with ENT exam findings. Left mastoid air cells and paranasal sinuses appear well aerated and clear. Workstation performed: [id number]     CT head without contrast    Result Date: 1/5/2024  Impression: 1.  Right prepontine/premedullary cistern subarachnoid hemorrhage. Minimal mass effect on the right middle cerebellar peduncle. Short-term follow-up is recommended. 2.  Opacification of the right mastoid air cells correlate clinically for mastoiditis. I personally discussed this study with REYNALDO MULLER on 1/5/2024 3:43 AM. Workstation performed: [id number]       EKG, Pathology, and Other Studies: I have personally reviewed pertinent reports.      VTE Pharmacologic Prophylaxis: none ordered    VTE Mechanical Prophylaxis: sequential compression device

## 2024-01-18 NOTE — OCCUPATIONAL THERAPY NOTE
Occupational Therapy Progress Note     Patient Name: Mateus Mckeon  Today's Date: 1/18/2024  Problem List  Principal Problem:    Acute CVA (cerebrovascular accident) (HCC)  Active Problems:    Type 1 diabetes mellitus (HCC)    Hyperphosphatemia    Anemia in chronic kidney disease, on chronic dialysis (HCC)    End stage kidney disease (HCC)    Subarachnoid hemorrhage (HCC)    Hypertensive emergency            01/18/24 0903   OT Last Visit   OT Visit Date 01/18/24   Note Type   Note Type Treatment   Pain Assessment   Pain Assessment Tool 0-10   Pain Score 2   Pain Location/Orientation Orientation: Bilateral;Location: Foot   Effect of Pain on Daily Activities neuropathy pain   Hospital Pain Intervention(s) Repositioned;Ambulation/increased activity   Restrictions/Precautions   Weight Bearing Precautions Per Order No   Other Precautions Cognitive;Chair Alarm;Bed Alarm;Multiple lines;Fall Risk;Pain  (RUE weakness, hx of CVA)   Lifestyle   Autonomy Pt reports being I with ADLs and receivies assistance with IADLs.Pt uses SPC for community mobility   Reciprocal Relationships Pt lives with parents- both retired   Service to Others Pt is unemployed   Intrinsic Gratification Pt enjoys relaxing and spending time with his nieces and nephews (ages 1-10)   ADL   Where Assessed Edge of bed   Grooming Assistance 5  Supervision/Setup   Grooming Deficit Brushing hair   Grooming Comments MIN A to don shampoo cap but washes hair with S   UB Dressing Assistance 5  Supervision/Setup   UB Dressing Deficit Thread LUE;Thread RUE   LB Dressing Assistance 5  Supervision/Setup   LB Dressing Deficit Don/doff L sock;Don/doff R sock   LB Dressing Comments figure 4, one-handed techniques   Bed Mobility   Supine to Sit 5  Supervision   Additional items HOB elevated;Bedrails;Increased time required   Transfers   Sit to Stand 4  Minimal assistance   Additional items Assist x 1   Stand to Sit 5  Supervision   Additional items Armrests  "  Additional Comments transfers with HHA   Functional Mobility   Functional Mobility 4  Minimal assistance   Additional Comments bed to chair, HHA   Subjective   Subjective \"It's neuropathy\"   Cognition   Arousal/Participation Responsive;Cooperative   Attention Within functional limits   Orientation Level Oriented X4   Following Commands Follows one step commands without difficulty   Comments Pt pleasant and cooperative t/o session   Activity Tolerance   Activity Tolerance Patient tolerated treatment well   Medical Staff Made Aware RN clearance for session   Assessment   Assessment Patient participated in Skilled OT session this date with interventions consisting of ADL re training with the use of correct body mechnaics, Energy Conservation techniques, safety awareness and fall prevention techniques,  therapeutic activities to: increase activity tolerance, increase dynamic sit/ stand balance during functional activity , and increase OOB/ sitting tolerance .Upon arrival patient was found supine in bed. Pt demonstrated the following tasks: S sup to sit, MIN A STS and fnxl mobility with HHA. Pt doffs/dons gown with S, S to doff/don socks. Pt requires MIN A to don shampoo cap, but is able to wash and comb hair with S/set up. Patient continues to be functioning below baseline level, occupational performance remains limited secondary to factors listed above and increased risk for falls and injury.   From OT standpoint, recommendation at time of d/c would be acute rehab.  Patient to benefit from continued Occupational Therapy treatment while in the hospital to address deficits as defined above and maximize level of functional independence with ADLs and functional mobility. Pt was left in chair after session with alarm on and all current needs met. The patient's raw score on the AM-PAC Daily Activity Inpatient Short Form is 16. A raw score of less than 19 suggests the patient may benefit from discharge to post-acute " rehabilitation services. Please refer to the recommendation of the Occupational Therapist for safe discharge planning.   Plan   Treatment Interventions ADL retraining;Functional transfer training;UE strengthening/ROM;Endurance training;Patient/family training;Compensatory technique education;Continued evaluation;Energy conservation;Activityengagement   Goal Expiration Date 01/30/24   OT Treatment Day 4   OT Frequency 3-5x/wk   Discharge Recommendation   Rehab Resource Intensity Level, OT I (Maximum Resource Intensity)   AM-PAC Daily Activity Inpatient   Lower Body Dressing 2   Bathing 3   Toileting 2   Upper Body Dressing 3   Grooming 3   Eating 3   Daily Activity Raw Score 16   Daily Activity Standardized Score (Calc for Raw Score >=11) 35.96   AM-PAC Applied Cognition Inpatient   Following a Speech/Presentation 3   Understanding Ordinary Conversation 4   Taking Medications 3   Remembering Where Things Are Placed or Put Away 3   Remembering List of 4-5 Errands 3   Taking Care of Complicated Tasks 2   Applied Cognition Raw Score 18   Applied Cognition Standardized Score 38.07     Agustina Clayton MS, OTR/L

## 2024-01-18 NOTE — ASSESSMENT & PLAN NOTE
Right basilar cistern SAH of unclear etiology  BD 19, HH 2, MF 3  S/p Cerebral angiography x 2 - both negative for source of hemorrhage (Dr. Mittal, 1/5/2024, 1/12/2024)  Presented on 1/4/2024 with MONTILLA that started on 12/31/23  Hx ASA use for hx multiple strokes, reversed with DDAVP on arrival  MRIs negative for source of SAH; small scattered acute/subacute infarcts noted.   Pt on hemodialysis for CKD.   1/12-1/13 started with new hand weakness and facial droop - multiple new areas of embolic infarcts to left PCA and bilateral MCA.    Imaging:  MRI brain wo, 1/14/2023: 1. Crescendo multifocal acute/recent infarctions involving at least 3 discrete vascular territories (left posterior cerebral artery and bilateral middle cerebral artery territories), worrisome for multifocal new/interval embolic infarctions. Differential diagnosis could include watershed infarctions in the appropriate clinical setting. These recent/acute infarctions are new/superimposed in the setting of previously present smaller foci of diffusion restriction, indicative of progressive/new interval infarctions since 1/8/2024 2. 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 No significant mass effect. 3. White matter changes likely correlate to areas of recent infarction. 4. Right mastoid air cell effusion. Mastoiditis not excluded.    Plan:  Continue to monitor neuro exam closely.    GCS 15 with RUE weakness and ataxia, right facial weakness.  STAT CTA with decline in GCS > 2 pts in 1 hour.  Maintain SBP < 160 mmHg, continues to have hypertensive episodes overnight.  Pt with hx of strokes. Continue ASA 81mg.   Mobilize as tolerated.  Disposition: requesting ARC secondary to new strokes.   PM&R consult appreciated. Needs to be able to complete 3h of therapy a day, currently limited 2/2 BP control  DVT ppx: SCD's, can resume dvt ppx at this time from nsx  standpoint    Neurosurgery is cleared to dc when medically appropriate to accepting rehab, will follow along in the interim.   Will plan for follow up in 6 weeks time with repeat MRI brain w wo contrast and MRA head wo contrast. Call with questions.

## 2024-01-18 NOTE — PLAN OF CARE
Problem: OCCUPATIONAL THERAPY ADULT  Goal: Performs self-care activities at highest level of function for planned discharge setting.  See evaluation for individualized goals.  Description: Treatment Interventions: ADL retraining, Functional transfer training, UE strengthening/ROM, Endurance training, Equipment evaluation/education, Compensatory technique education, Continued evaluation, Energy conservation, Activityengagement          See flowsheet documentation for full assessment, interventions and recommendations.   Outcome: Progressing  Note: Limitation: Decreased ADL status, Decreased UE ROM, Decreased Safe judgement during ADL, Decreased UE strength, Decreased cognition, Decreased endurance, Decreased self-care trans, Decreased high-level ADLs, Decreased fine motor control, Decreased sensation, Non-func R UE  Prognosis: Fair  Assessment: Patient participated in Skilled OT session this date with interventions consisting of ADL re training with the use of correct body mechnaics, Energy Conservation techniques, safety awareness and fall prevention techniques,  therapeutic activities to: increase activity tolerance, increase dynamic sit/ stand balance during functional activity , and increase OOB/ sitting tolerance .Upon arrival patient was found supine in bed. Pt demonstrated the following tasks: S sup to sit, MIN A STS and fnxl mobility with HHA. Pt doffs/dons gown with S, S to doff/don socks. Pt requires MIN A to don shampoo cap, but is able to wash and comb hair with S/set up. Patient continues to be functioning below baseline level, occupational performance remains limited secondary to factors listed above and increased risk for falls and injury.   From OT standpoint, recommendation at time of d/c would be acute rehab.  Patient to benefit from continued Occupational Therapy treatment while in the hospital to address deficits as defined above and maximize level of functional independence with ADLs and  functional mobility. Pt was left in chair after session with alarm on and all current needs met. The patient's raw score on the AM-PAC Daily Activity Inpatient Short Form is 16. A raw score of less than 19 suggests the patient may benefit from discharge to post-acute rehabilitation services. Please refer to the recommendation of the Occupational Therapist for safe discharge planning.     Rehab Resource Intensity Level, OT: I (Maximum Resource Intensity)

## 2024-01-18 NOTE — PROGRESS NOTES
"Health system  Progress Note  Name: Mateus Mckeon I  MRN: [id number]  Unit/Bed#: [facility] 718-01 I Date of Admission: 1/5/2024   Date of Service: 1/18/2024 I Hospital Day: 13    Assessment/Plan   * Acute CVA (cerebrovascular accident) (HCC)  Assessment & Plan  RUE weakness with mild facial droop 1/12-13  MRI brain \"Crescendo multifocal acute/recent infarctions involving at least 3 discrete vascular territories (left posterior cerebral artery and bilateral middle cerebral artery territories), worrisome for multifocal new/interval embolic infarctions\"  Continue neuro-checks  ASA 81 mg daily and atorvastatin 40mg daily.   Telemetry.  No events  Echo 1/5 no PFO  Cleared for neurology standpoint   Nsx cleared patient for dvt ppx  Started on heparin  Patient will need to Follow up with Neurosurgery in 6 weeks for MRI  PT/OT/PMR    Hypertensive emergency  Assessment & Plan  Nitro Drip started on 1/16/2023  Currently on weekly clonidine patch , hydralazine 100mg TID, lisinopril 40mg qd, labetalol 800 mg TID, procardia (increased to 60mg am/90mg pm)  Prn IV labetalol and hydralazine - has been receiving multiple doses  Further volume removal by HD  Patient is down 5 pounds  Nitro Drip is titrating down  Discussed with nephrology. Appreciate input.       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 Mittal, 1/5/2024, 1/12/2024)4.   On ASA for previous strokes reversed with DDAVP.  MRIs negative for source of SAH, small scattered acute/subacute infarcts noted.   CT head wo 1/9/24: Improving subarachnoid hemorrhage compared to recent prior studies. No new findings.     Plan:  ASA 81 mg daily resumed and Nimodipine discontinued per NSX recs 1/12.  No further need for SAH pathway per NSx.  Continue neurochecks  BP control has been " very challenging. Goal of SBP<160      End stage kidney disease ([facility])  Assessment & Plan  Dialysis per nephrology     Anemia in chronic kidney disease, on chronic dialysis ([facility])  Assessment & Plan  Lab Results   Component Value Date    EGFR 8 01/18/2024    EGFR 5 01/17/2024    EGFR 7 01/16/2024    CREATININE 7.06 (H) 01/18/2024    CREATININE 10.79 (H) 01/17/2024    CREATININE 7.91 (H) 01/16/2024   hgb is stable  Continue to monitor   Dialysis MWF    Type 1 diabetes mellitus ([facility])  Assessment & Plan  Lab Results   Component Value Date    HGBA1C 6.0 (H) 01/14/2024       Recent Labs     01/17/24  1249 01/17/24  1809 01/18/24  0009 01/18/24  0600   POCGLU 129 183* 144* 110     Managed with insulin pump.    Blood Sugar Average: Last 72 hrs:  (P) 147.5    Blood glucose very well controlled  Continue using pump  Continue to monitor                VTE Pharmacologic Prophylaxis: VTE Score: 7 Moderate Risk (Score 3-4) - Pharmacological DVT Prophylaxis Ordered: heparin.    Mobility:   Basic Mobility Inpatient Raw Score: 17  JH-HLM Goal: 5: Stand one or more mins  JH-HLM Achieved: 3: Sit at edge of bed  HLM Goal NOT achieved. Continue with multidisciplinary rounding and encourage appropriate mobility to improve upon HLM goals.    Patient Centered Rounds: I performed bedside rounds with nursing staff today.   Discussions with Specialists or Other Care Team Provider: Neurosurgery    Education and Discussions with Family / Patient: Updated  (mother) at bedside.    Total Time Spent on Date of Encounter in care of patient: 40 mins. This time was spent on one or more of the following: performing physical exam; counseling and coordination of care; obtaining or reviewing history; documenting in the medical record; reviewing/ordering tests, medications or procedures; communicating with other healthcare professionals and discussing with patient's family/caregivers.    Current Length of Stay: 13 day(s)  Current Patient  Status: Inpatient   Certification Statement: The patient will continue to require additional inpatient hospital stay due to Resistant HTN in the setting of a SAH  Discharge Plan: Anticipate discharge in 24-48 hrs to rehab facility.    Code Status: Level 1 - Full Code    Subjective:   This is a very pleasant 38 y.o. male who was seen today at bedside. Patient has no new complaints. Patient is not in any acute distress.       Objective:     Vitals:   Temp (24hrs), Av.3 °F (36.8 °C), Min:98 °F (36.7 °C), Max:98.5 °F (36.9 °C)    Temp:  [98 °F (36.7 °C)-98.5 °F (36.9 °C)] 98.5 °F (36.9 °C)  HR:  [68-80] 71  Resp:  [14-25] 18  BP: (138-197)/() 158/96  SpO2:  [95 %] 95 %  Body mass index is 34.8 kg/m².     Input and Output Summary (last 24 hours):     Intake/Output Summary (Last 24 hours) at 2024 0846  Last data filed at 2024 1230  Gross per 24 hour   Intake 320 ml   Output 4500 ml   Net -4180 ml       Physical Exam:   Physical Exam  Vitals reviewed.   Constitutional:       General: He is not in acute distress.     Appearance: Normal appearance. He is not ill-appearing.   HENT:      Head: Normocephalic and atraumatic.      Right Ear: External ear normal.      Left Ear: External ear normal.      Nose: Nose normal.   Eyes:      Extraocular Movements: Extraocular movements intact.      Conjunctiva/sclera: Conjunctivae normal.   Cardiovascular:      Rate and Rhythm: Normal rate and regular rhythm.      Pulses: Normal pulses.      Heart sounds: Normal heart sounds.   Pulmonary:      Effort: Pulmonary effort is normal.      Breath sounds: Normal breath sounds.   Abdominal:      General: Abdomen is flat.      Palpations: Abdomen is soft.   Musculoskeletal:         General: Normal range of motion.      Cervical back: Normal range of motion.   Skin:     General: Skin is warm and dry.   Neurological:      General: No focal deficit present.      Mental Status: He is alert. Mental status is at baseline.       Comments: RUE weakness   Psychiatric:         Mood and Affect: Mood normal.         Behavior: Behavior normal.          Additional Data:     Labs:  Results from last 7 days   Lab Units 01/18/24  0558   WBC Thousand/uL 7.25   HEMOGLOBIN g/dL 7.7*   HEMATOCRIT % 23.9*   PLATELETS Thousands/uL 242   NEUTROS PCT % 62   LYMPHS PCT % 21   MONOS PCT % 11   EOS PCT % 4     Results from last 7 days   Lab Units 01/18/24  0558   SODIUM mmol/L 139   POTASSIUM mmol/L 4.2   CHLORIDE mmol/L 99   CO2 mmol/L 32   BUN mg/dL 22   CREATININE mg/dL 7.06*   ANION GAP mmol/L 8   CALCIUM mg/dL 7.9*   ALBUMIN g/dL 3.2*   TOTAL BILIRUBIN mg/dL 0.46   ALK PHOS U/L 68   ALT U/L 10   AST U/L 14   GLUCOSE RANDOM mg/dL 146*     Results from last 7 days   Lab Units 01/12/24  0623   INR  1.05     Results from last 7 days   Lab Units 01/18/24  0600 01/18/24  0009 01/17/24  1809 01/17/24  1249 01/17/24  0620 01/17/24  0121 01/16/24  1813 01/16/24  1200 01/16/24  0703 01/15/24  1602 01/15/24  1302 01/15/24  0845   POC GLUCOSE mg/dl 110 144* 183* 129 151* 129 196* 137 131 181* 128 187*     Results from last 7 days   Lab Units 01/14/24  0604   HEMOGLOBIN A1C % 6.0*           Lines/Drains:  Invasive Devices       Peripheral Intravenous Line  Duration             Peripheral IV 01/17/24 Right;Ventral (anterior) Forearm 1 day              Line  Duration             Hemodialysis AV Fistula 11/09/20 Left Other (Comment) 1164 days                          Imaging: No pertinent imaging reviewed.    Recent Cultures (last 7 days):         Last 24 Hours Medication List:   Current Facility-Administered Medications   Medication Dose Route Frequency Provider Last Rate    aspirin  81 mg Oral Daily Malu Moore PA-C      atorvastatin  40 mg Oral QPM Radha Steen MD      bisacodyl  10 mg Rectal Daily PRN Radha Steen MD      calcium acetate  667 mg Oral TID With Meals Radha Steen MD      cinacalcet  60 mg Oral Daily Radha Steen MD      cloNIDine  0.3 mg Oral Q8H HALIE  Valeria Forrester PA-C      escitalopram  20 mg Oral HS Bryan Claire MD      famotidine  40 mg Oral Daily Radha Steen MD      fentaNYL  25 mcg Intravenous Q3 min PRN Elizabeth Martinez CRNA      gabapentin  300 mg Oral Daily Radha Steen MD      heparin (porcine)  5,000 Units Subcutaneous Q8H [facility] Bryan Claire MD      hydrALAZINE  20 mg Intravenous Q4H PRN Court Edwards MD      hydrALAZINE  100 mg Oral TID AURELIANO Jay-CLOTILDE      insulin lispro  300 Units Subcutaneous Insulin Pump Daily PRN Radha Steen MD      labetalol  20 mg Intravenous Q4H PRN AURELIANO Jay-CLOTILDE      labetalol  800 mg Oral TID Valeria Forrester PA-C      lisinopril  40 mg Oral Daily Valeria Forrester PA-C      melatonin  6 mg Oral HS PRN Cinthia Dempsey MD      NIFEdipine  90 mg Oral BID Valeria Forrester PA-C      nitroGLYcerin  5-200 mcg/min Intravenous Titrated TRACY JayC 60 mcg/min (01/18/24 0817)    ondansetron  4 mg Intravenous Q4H PRN Radha Steen MD      ondansetron  4 mg Intravenous Once PRN Elizabeth Martinez CRNA      oxyCODONE  2.5 mg Oral Q4H PRN Radha Steen MD      Or    oxyCODONE  5 mg Oral Q4H PRN Radha Steen MD      patient maintained insulin pump  1 each Subcutaneous Q8H Radha Steen MD      polyethylene glycol  17 g Oral Daily Radha Steen MD      prochlorperazine  5 mg Oral Q6H PRN Radha Steen MD      senna-docusate sodium  1 tablet Oral BID Radha Steen MD          Today, Patient Was Seen By: Bryan Claire MD    **Please Note: This note may have been constructed using a voice recognition system.**

## 2024-01-18 NOTE — PHYSICAL THERAPY NOTE
PHYSICAL THERAPY NOTE          Patient Name: Mateus Mckeon  Today's Date: 1/18/2024 01/18/24 1050   PT Last Visit   PT Visit Date 01/18/24   Note Type   Note Type Treatment   Pain Assessment   Pain Assessment Tool 0-10   Pain Score No Pain   Restrictions/Precautions   Weight Bearing Precautions Per Order No   Other Precautions Cognitive;Chair Alarm;Bed Alarm;Multiple lines;Telemetry;Limb alert;Fall Risk  (R hemiparesis, L limb alert)   General   Chart Reviewed Yes   Response to Previous Treatment Patient with no complaints from previous session.   Family/Caregiver Present No   Cognition   Overall Cognitive Status Impaired   Arousal/Participation Cooperative   Attention Attends with cues to redirect   Orientation Level Oriented X4   Following Commands Follows one step commands without difficulty   Comments very pleasant   Subjective   Subjective grateful for services   Bed Mobility   Supine to Sit Unable to assess   Sit to Supine Unable to assess   Transfers   Sit to Stand 4  Minimal assistance   Additional items Assist x 1;Increased time required;Verbal cues   Stand to Sit 4  Minimal assistance   Additional items Assist x 1;Increased time required;Verbal cues   Stand pivot 4  Minimal assistance   Additional items Assist x 1;Increased time required;Verbal cues   Additional Comments with and without rollator; x4 t/o   Ambulation/Elevation   Gait pattern Improper Weight shift;Decreased foot clearance;Short stride;Excessively slow   Gait Assistance 4  Minimal assist   Additional items Assist x 1;Verbal cues  (+SBA of 2nd for safety and chair follow-c Rollator; minAx2 HHA)   Assistive Device   (rollator vs HHA)   Distance 40'x2 c rollator+30' HHA  (seated rest break bw all attempts)   Ambulation/Elevation Additional Comments A for R  on rollator   Balance   Static Sitting Fair -   Dynamic Sitting Fair -   Static Standing  Poor +   Dynamic Standing Poor +   Ambulatory Poor   Endurance Deficit   Endurance Deficit Yes   Endurance Deficit Description fatigue, R hemiparesis   Activity Tolerance   Activity Tolerance Patient limited by fatigue   Medical Staff Made Aware SPT GERMÁN   Nurse Made Aware yes-cleared   Exercises   Neuro re-ed lateral walking at HR with b/l UE support and minAx1 15' L and R each. picking up object with b/l UE (A RUE)   Balance training  with RUE support and CGA: narrow COLBY x30 sec, tandem stance L vs R x30 sec each   Assessment   Prognosis Fair   Problem List Decreased strength;Decreased endurance;Impaired balance;Decreased coordination;Decreased mobility;Decreased cognition;Impaired sensation   Assessment Pt agreeable to participate in PT session. Pt performed functional mobility and therex as outlined above. Unable to move R distal UE on command but functionally light  on rollator and handrail. Improved ambulation distance with less A when utilizing rollator and more unsteady without AD. Fatigues easily and requires seated rest breaks throughout. Progressing towards goals. Pt left seated in chair with chair alarm, call bell, phone, and all personal needs within reach. Pt will continue to benefit from skilled acute care PT to further address their functional mobility limitations.   Barriers to Discharge Inaccessible home environment;Decreased caregiver support   Goals   Patient Goals to go to rehab   STG Expiration Date 01/30/24   PT Treatment Day 4   Plan   Treatment/Interventions LE strengthening/ROM;Functional transfer training;Therapeutic exercise;Endurance training;Cognitive reorientation;Patient/family training;Equipment eval/education;Bed mobility;Gait training;Elevations;Spoke to nursing;Spoke to advanced practitioner;Spoke to case management;OT   Progress Progressing toward goals   PT Frequency 3-5x/wk   Discharge Recommendation   Rehab Resource Intensity Level, PT I (Maximum Resource Intensity)    Equipment Recommended Walker   Walker Package Recommended Wheeled walker   AM-PAC Basic Mobility Inpatient   Turning in Flat Bed Without Bedrails 3   Lying on Back to Sitting on Edge of Flat Bed Without Bedrails 3   Moving Bed to Chair 3   Standing Up From Chair Using Arms 3   Walk in Room 2   Climb 3-5 Stairs With Railing 2   Basic Mobility Inpatient Raw Score 16   Basic Mobility Standardized Score 38.32   Highest Level Of Mobility   JH-HLM Goal 5: Stand one or more mins   JH-HLM Achieved 7: Walk 25 feet or more   Elliot Mclaughlin, PT, DPT

## 2024-01-18 NOTE — ASSESSMENT & PLAN NOTE
Per nephrology and medicine  Increased nifedipine 90mg PO BID (max dose), remainder regimen continued  Currently on nitro gtt  Goal SBP < 160

## 2024-01-18 NOTE — ASSESSMENT & PLAN NOTE
Lab Results   Component Value Date    HGBA1C 6.0 (H) 01/14/2024       Recent Labs     01/17/24  1249 01/17/24  1809 01/18/24  0009 01/18/24  0600   POCGLU 129 183* 144* 110         Blood Sugar Average: Last 72 hrs:  (P) 147.5    Continue management per primary team

## 2024-01-18 NOTE — ASSESSMENT & PLAN NOTE
Nitro Drip started on 1/16/2023  Currently on weekly clonidine patch , hydralazine 100mg TID, lisinopril 40mg qd, labetalol 800 mg TID, procardia (increased to 60mg am/90mg pm)  Prn IV labetalol and hydralazine - has been receiving multiple doses  Further volume removal by HD  Patient is down 5 pounds  Nitro Drip is titrating down  Discussed with nephrology. Appreciate input.

## 2024-01-18 NOTE — ASSESSMENT & PLAN NOTE
Lab Results   Component Value Date    EGFR 8 01/18/2024    EGFR 5 01/17/2024    EGFR 7 01/16/2024    CREATININE 7.06 (H) 01/18/2024    CREATININE 10.79 (H) 01/17/2024    CREATININE 7.91 (H) 01/16/2024     On HD (MW)

## 2024-01-18 NOTE — PLAN OF CARE
Problem: PAIN - ADULT  Goal: Verbalizes/displays adequate comfort level or baseline comfort level  Description: Interventions:  - Encourage patient to monitor pain and request assistance  - Assess pain using appropriate pain scale  - Administer analgesics based on type and severity of pain and evaluate response  - Implement non-pharmacological measures as appropriate and evaluate response  - Consider cultural and social influences on pain and pain management  - Notify physician/advanced practitioner if interventions unsuccessful or patient reports new pain  Outcome: Progressing     Problem: INFECTION - ADULT  Goal: Absence or prevention of progression during hospitalization  Description: INTERVENTIONS:  - Assess and monitor for signs and symptoms of infection  - Monitor lab/diagnostic results  - Monitor all insertion sites, i.e. indwelling lines, tubes, and drains  - Monitor endotracheal if appropriate and nasal secretions for changes in amount and color  - [place] appropriate cooling/warming therapies per order  - Administer medications as ordered  - Instruct and encourage patient and family to use good hand hygiene technique  - Identify and instruct in appropriate isolation precautions for identified infection/condition  Outcome: Progressing     Problem: SAFETY ADULT  Goal: Patient will remain free of falls  Description: INTERVENTIONS:  - Educate patient/family on patient safety including physical limitations  - Instruct patient to call for assistance with activity   - Consult OT/PT to assist with strengthening/mobility   - Keep Call bell within reach  - Keep bed low and locked with side rails adjusted as appropriate  - Keep care items and personal belongings within reach  - Initiate and maintain comfort rounds  - Make Fall Risk Sign visible to staff  - Offer Toileting every 2 Hours, in advance of need  - Apply yellow socks and bracelet for high fall risk patients  - Consider moving patient to room near nurses  station  Outcome: Progressing  Goal: Maintain or return to baseline ADL function  Description: INTERVENTIONS:  -  Assess patient's ability to carry out ADLs; assess patient's baseline for ADL function and identify physical deficits which impact ability to perform ADLs (bathing, care of mouth/teeth, toileting, grooming, dressing, etc.)  - Assess/evaluate cause of self-care deficits   - Assess range of motion  - Assess patient's mobility; develop plan if impaired  - Assess patient's need for assistive devices and provide as appropriate  - Encourage maximum independence but intervene and supervise when necessary  - Involve family in performance of ADLs  - Assess for home care needs following discharge   - Consider OT consult to assist with ADL evaluation and planning for discharge  - Provide patient education as appropriate  Outcome: Progressing  Goal: Maintains/Returns to pre admission functional level  Description: INTERVENTIONS:  - Perform AM-PAC 6 Click Basic Mobility/ Daily Activity assessment daily.  - Set and communicate daily mobility goal to care team and patient/family/caregiver.   - Collaborate with rehabilitation services on mobility goals if consulted  - Perform Range of Motion 2 times a day.  - Reposition patient every 2 hours.  - Dangle patient 2 times a day  - Stand patient 2 times a day  - Ambulate patient 2 times a day  - Out of bed to chair 2 times a day   - Out of bed for meals 2 times a day  - Out of bed for toileting  - Record patient progress and toleration of activity level   Outcome: Progressing     Problem: DISCHARGE PLANNING  Goal: Discharge to home or other facility with appropriate resources  Description: INTERVENTIONS:  - Identify barriers to discharge w/patient and caregiver  - Arrange for needed discharge resources and transportation as appropriate  - Identify discharge learning needs (meds, wound care, etc.)  - Arrange for interpretive services to assist at discharge as needed  - Refer  to Case Management Department for coordinating discharge planning if the patient needs post-hospital services based on physician/advanced practitioner order or complex needs related to functional status, cognitive ability, or social support system  Outcome: Progressing     Problem: Knowledge Deficit  Goal: Patient/family/caregiver demonstrates understanding of disease process, treatment plan, medications, and discharge instructions  Description: Complete learning assessment and assess knowledge base.  Interventions:  - Provide teaching at level of understanding  - Provide teaching via preferred learning methods  Outcome: Progressing     Problem: Nutrition/Hydration-ADULT  Goal: Nutrient/Hydration intake appropriate for improving, restoring or maintaining nutritional needs  Description: Monitor and assess patient's nutrition/hydration status for malnutrition. Collaborate with interdisciplinary team and initiate plan and interventions as ordered.  Monitor patient's weight and dietary intake as ordered or per policy. Utilize nutrition screening tool and intervene as necessary. Determine patient's food preferences and provide high-protein, high-caloric foods as appropriate.     INTERVENTIONS:  - Monitor oral intake, urinary output, labs, and treatment plans  - Assess nutrition and hydration status and recommend course of action  - Evaluate amount of meals eaten  - Assist patient with eating if necessary   - Allow adequate time for meals  - Recommend/ encourage appropriate diets, oral nutritional supplements, and vitamin/mineral supplements  - Order, calculate, and assess calorie counts as needed  - Recommend, monitor, and adjust tube feedings and TPN/PPN based on assessed needs  - Assess need for intravenous fluids  - Provide specific nutrition/hydration education as appropriate  - Include patient/family/caregiver in decisions related to nutrition  Outcome: Progressing     Problem: METABOLIC, FLUID AND ELECTROLYTES -  ADULT  Goal: Electrolytes maintained within normal limits  Description: INTERVENTIONS:  - Monitor labs and assess patient for signs and symptoms of electrolyte imbalances  - Administer electrolyte replacement as ordered  - Monitor response to electrolyte replacements, including repeat lab results as appropriate  - Instruct patient on fluid and nutrition as appropriate  Outcome: Progressing  Goal: Fluid balance maintained  Description: INTERVENTIONS:  - Monitor labs   - Monitor I/O and WT  - Instruct patient on fluid and nutrition as appropriate  - Assess for signs & symptoms of volume excess or deficit  Outcome: Progressing     Problem: Prexisting or High Potential for Compromised Skin Integrity  Goal: Skin integrity is maintained or improved  Description: INTERVENTIONS:  - Identify patients at risk for skin breakdown  - Assess and monitor skin integrity  - Assess and monitor nutrition and hydration status  - Monitor labs   - Assess for incontinence   - Turn and reposition patient  - Assist with mobility/ambulation  - Relieve pressure over bony prominences  - Avoid friction and shearing  - Provide appropriate hygiene as needed including keeping skin clean and dry  - Evaluate need for skin moisturizer/barrier cream  - Collaborate with interdisciplinary team   - Patient/family teaching  - Consider wound care consult   Outcome: Progressing

## 2024-01-18 NOTE — ARC ADMISSION
Reviewed patient's case with ARC physician - patient is approved for ARC pending medical stability, functional progress and bed availability. Patient will need to be transitioned to oral antihypertensive regimen prior to ARC admission, and will need to demonstrate tolerance for ARC program. CM has been updated. Will continue to follow at this time.

## 2024-01-18 NOTE — PLAN OF CARE
Problem: PAIN - ADULT  Goal: Verbalizes/displays adequate comfort level or baseline comfort level  Description: Interventions:  - Encourage patient to monitor pain and request assistance  - Assess pain using appropriate pain scale  - Administer analgesics based on type and severity of pain and evaluate response  - Implement non-pharmacological measures as appropriate and evaluate response  - Consider cultural and social influences on pain and pain management  - Notify physician/advanced practitioner if interventions unsuccessful or patient reports new pain  Outcome: Progressing     Problem: INFECTION - ADULT  Goal: Absence or prevention of progression during hospitalization  Description: INTERVENTIONS:  - Assess and monitor for signs and symptoms of infection  - Monitor lab/diagnostic results  - Monitor all insertion sites, i.e. indwelling lines, tubes, and drains  - Monitor endotracheal if appropriate and nasal secretions for changes in amount and color  - [place] appropriate cooling/warming therapies per order  - Administer medications as ordered  - Instruct and encourage patient and family to use good hand hygiene technique  - Identify and instruct in appropriate isolation precautions for identified infection/condition  Outcome: Progressing     Problem: SAFETY ADULT  Goal: Patient will remain free of falls  Description: INTERVENTIONS:  - Educate patient/family on patient safety including physical limitations  - Instruct patient to call for assistance with activity   - Consult OT/PT to assist with strengthening/mobility   - Keep Call bell within reach  - Keep bed low and locked with side rails adjusted as appropriate  - Keep care items and personal belongings within reach  - Initiate and maintain comfort rounds  - Make Fall Risk Sign visible to staff  - Offer Toileting every 3 Hours, in advance of need  - Initiate/Maintain bed alarm  - Obtain necessary fall risk management equipment:   - Apply yellow socks and  bracelet for high fall risk patients  - Consider moving patient to room near nurses station  Outcome: Progressing  Goal: Maintain or return to baseline ADL function  Description: INTERVENTIONS:  -  Assess patient's ability to carry out ADLs; assess patient's baseline for ADL function and identify physical deficits which impact ability to perform ADLs (bathing, care of mouth/teeth, toileting, grooming, dressing, etc.)  - Assess/evaluate cause of self-care deficits   - Assess range of motion  - Assess patient's mobility; develop plan if impaired  - Assess patient's need for assistive devices and provide as appropriate  - Encourage maximum independence but intervene and supervise when necessary  - Involve family in performance of ADLs  - Assess for home care needs following discharge   - Consider OT consult to assist with ADL evaluation and planning for discharge  - Provide patient education as appropriate  Outcome: Progressing  Goal: Maintains/Returns to pre admission functional level  Description: INTERVENTIONS:  - Perform AM-PAC 6 Click Basic Mobility/ Daily Activity assessment daily.  - Set and communicate daily mobility goal to care team and patient/family/caregiver.   - Collaborate with rehabilitation services on mobility goals if consulted  - Perform Range of Motion 3 times a day.  - Reposition patient every 3 hours.  - Dangle patient 3 times a day  - Stand patient 3 times a day  - Ambulate patient 3 times a day  - Out of bed to chair 3 times a day   - Out of bed for meals 3 times a day  - Out of bed for toileting  - Record patient progress and toleration of activity level   Outcome: Progressing     Problem: DISCHARGE PLANNING  Goal: Discharge to home or other facility with appropriate resources  Description: INTERVENTIONS:  - Identify barriers to discharge w/patient and caregiver  - Arrange for needed discharge resources and transportation as appropriate  - Identify discharge learning needs (meds, wound care,  etc.)  - Arrange for interpretive services to assist at discharge as needed  - Refer to Case Management Department for coordinating discharge planning if the patient needs post-hospital services based on physician/advanced practitioner order or complex needs related to functional status, cognitive ability, or social support system  Outcome: Progressing     Problem: Knowledge Deficit  Goal: Patient/family/caregiver demonstrates understanding of disease process, treatment plan, medications, and discharge instructions  Description: Complete learning assessment and assess knowledge base.  Interventions:  - Provide teaching at level of understanding  - Provide teaching via preferred learning methods  Outcome: Progressing     Problem: Nutrition/Hydration-ADULT  Goal: Nutrient/Hydration intake appropriate for improving, restoring or maintaining nutritional needs  Description: Monitor and assess patient's nutrition/hydration status for malnutrition. Collaborate with interdisciplinary team and initiate plan and interventions as ordered.  Monitor patient's weight and dietary intake as ordered or per policy. Utilize nutrition screening tool and intervene as necessary. Determine patient's food preferences and provide high-protein, high-caloric foods as appropriate.     INTERVENTIONS:  - Monitor oral intake, urinary output, labs, and treatment plans  - Assess nutrition and hydration status and recommend course of action  - Evaluate amount of meals eaten  - Assist patient with eating if necessary   - Allow adequate time for meals  - Recommend/ encourage appropriate diets, oral nutritional supplements, and vitamin/mineral supplements  - Order, calculate, and assess calorie counts as needed  - Recommend, monitor, and adjust tube feedings and TPN/PPN based on assessed needs  - Assess need for intravenous fluids  - Provide specific nutrition/hydration education as appropriate  - Include patient/family/caregiver in decisions related  to nutrition  Outcome: Progressing     Problem: METABOLIC, FLUID AND ELECTROLYTES - ADULT  Goal: Electrolytes maintained within normal limits  Description: INTERVENTIONS:  - Monitor labs and assess patient for signs and symptoms of electrolyte imbalances  - Administer electrolyte replacement as ordered  - Monitor response to electrolyte replacements, including repeat lab results as appropriate  - Instruct patient on fluid and nutrition as appropriate  Outcome: Progressing  Goal: Fluid balance maintained  Description: INTERVENTIONS:  - Monitor labs   - Monitor I/O and WT  - Instruct patient on fluid and nutrition as appropriate  - Assess for signs & symptoms of volume excess or deficit  Outcome: Progressing     Problem: Prexisting or High Potential for Compromised Skin Integrity  Goal: Skin integrity is maintained or improved  Description: INTERVENTIONS:  - Identify patients at risk for skin breakdown  - Assess and monitor skin integrity  - Assess and monitor nutrition and hydration status  - Monitor labs   - Assess for incontinence   - Turn and reposition patient  - Assist with mobility/ambulation  - Relieve pressure over bony prominences  - Avoid friction and shearing  - Provide appropriate hygiene as needed including keeping skin clean and dry  - Evaluate need for skin moisturizer/barrier cream  - Collaborate with interdisciplinary team   - Patient/family teaching  - Consider wound care consult   Outcome: Progressing

## 2024-01-18 NOTE — ASSESSMENT & PLAN NOTE
"RUE weakness with mild facial droop 1/12-13  MRI brain \"Crescendo multifocal acute/recent infarctions involving at least 3 discrete vascular territories (left posterior cerebral artery and bilateral middle cerebral artery territories), worrisome for multifocal new/interval embolic infarctions\"  Continue neuro-checks  ASA 81 mg daily and atorvastatin 40mg daily.   Telemetry.  No events  Echo 1/5 no PFO  Cleared for neurology standpoint   Nsx cleared patient for dvt ppx  Started on heparin  Patient will need to Follow up with Neurosurgery in 6 weeks for MRI  PT/OT/PMR  "

## 2024-01-18 NOTE — ASSESSMENT & PLAN NOTE
Lab Results   Component Value Date    EGFR 8 01/18/2024    EGFR 5 01/17/2024    EGFR 7 01/16/2024    CREATININE 7.06 (H) 01/18/2024    CREATININE 10.79 (H) 01/17/2024    CREATININE 7.91 (H) 01/16/2024   hgb is stable  Continue to monitor   Dialysis MWF

## 2024-01-18 NOTE — ASSESSMENT & PLAN NOTE
Lab Results   Component Value Date    HGBA1C 6.0 (H) 01/14/2024       Recent Labs     01/17/24  1249 01/17/24  1809 01/18/24  0009 01/18/24  0600   POCGLU 129 183* 144* 110     Managed with insulin pump.    Blood Sugar Average: Last 72 hrs:  (P) 147.5    Blood glucose very well controlled  Continue using pump  Continue to monitor

## 2024-01-18 NOTE — CASE MANAGEMENT
Case Management Discharge Planning Note    Patient name Mateus Mckeon  Location [facility] 718/[facility] 718-01 MRN [id number]  : 1985 Date 2024       Current Admission Date: 2024  Current Admission Diagnosis:Acute CVA (cerebrovascular accident) ([facility])   Patient Active Problem List    Diagnosis Date Noted    Acute CVA (cerebrovascular accident) ([facility]) 2024    Hypertensive emergency 2024    Abnormal finding on MRI of brain 2024    Subarachnoid hemorrhage (HCC) 2024    Anemia due to chronic kidney disease, on chronic dialysis ([facility]) 2024    Type 1 diabetes mellitus on insulin therapy ([facility]) 2023    Hyperlipidemia 2023    Anxiety 2023    Insomnia 2023    RACHEAL (obstructive sleep apnea)     Status post placement of implantable loop recorder 2022    Coronary artery disease involving native coronary artery of native heart without angina pectoris 2022    Pulmonary HTN ([facility]) 2022    Hypothyroidism 2022    Cerebellar stroke syndrome 2022    Anemia in chronic kidney disease 2022    Essential (primary) hypertension 2022    Numbness of arm 2021    Mild episode of recurrent major depressive disorder ([facility]) 2021    Generalized anxiety disorder 2021    Medical cannabis use 04/15/2021    Tubulovillous adenoma of colon 2021    GERD (gastroesophageal reflux disease) 2021    End stage kidney disease ([facility]) 2020    Secondary hyperparathyroidism ([facility]) 10/23/2020    Diabetic neuropathy ([facility]) 2020    Provoked seizure ([facility]) 2020    Asterixis 2020    Foot drop, bilateral 2020    Impaired mobility and ADLs 2020    Vertigo 2020    Multiple pulmonary nodules 2019    Gastroparesis diabeticorum  2019    Pruritus 2019    Environmental and seasonal allergies 2019    Strabismus 2018    Dyslipidemia 2018    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    Renovascular hypertension 03/26/2018    Left atrial dilation 02/27/2018    Bilateral leg edema 02/19/2018    Persistent proteinuria 02/11/2018    Anemia in chronic kidney disease, on chronic dialysis (HCC) 02/10/2018    Hypertension 02/09/2018    Homozygous MTHFR mutation [id number] 02/05/2018    Hyperphosphatemia 01/29/2018    Vitamin D deficiency 01/29/2018    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 [address] [place] [id number]  Phone: [phone] Fax: [phone]    [facility] #[id number] [id number] [facility] [address]  Phone: [phone] Fax: [phone]    Primary Care Provider: Dania Sher DO    Primary Insurance: MEDICARE  Secondary Insurance: [facility]    DISCHARGE DETAILS:                   Additional Comments: Phone call to pt mother Francine @ [phone] to discuss DCP. Francine would like to tour [facility].

## 2024-01-18 NOTE — RESTORATIVE TECHNICIAN NOTE
Restorative Technician Note      Patient Name: Mateus Mckeon     Note Type: Mobility  Patient Position Upon Consult: Bedside chair  Activity Performed: Ambulated; Dangled; Stood  Assistive Device: Other (Comment) (Rollator Walker)  Education Provided: Yes  Patient Position at End of Consult: Bedside chair; All needs within reach; Bed/Chair alarm activated    Savi YIN, Restorative Technician,

## 2024-01-19 ENCOUNTER — APPOINTMENT (INPATIENT)
Dept: DIALYSIS | Facility: HOSPITAL | Age: 39
DRG: 064 | End: 2024-01-19
Payer: MEDICARE

## 2024-01-19 LAB
ALDOST SERPL-MCNC: <1 NG/DL (ref 0–30)
ALDOST/RENIN PLAS-RTO: <5.8 {RATIO} (ref 0–30)
GLUCOSE SERPL-MCNC: 136 MG/DL (ref 65–140)
GLUCOSE SERPL-MCNC: 141 MG/DL (ref 65–140)
GLUCOSE SERPL-MCNC: 204 MG/DL (ref 65–140)
GLUCOSE SERPL-MCNC: 95 MG/DL (ref 65–140)
RENIN PLAS-CCNC: 0.17 NG/ML/HR (ref 0.17–5.38)

## 2024-01-19 PROCEDURE — 82948 REAGENT STRIP/BLOOD GLUCOSE: CPT

## 2024-01-19 PROCEDURE — 99232 SBSQ HOSP IP/OBS MODERATE 35: CPT | Performed by: NURSE PRACTITIONER

## 2024-01-19 PROCEDURE — 99232 SBSQ HOSP IP/OBS MODERATE 35: CPT | Performed by: FAMILY MEDICINE

## 2024-01-19 PROCEDURE — 90935 HEMODIALYSIS ONE EVALUATION: CPT | Performed by: STUDENT IN AN ORGANIZED HEALTH CARE EDUCATION/TRAINING PROGRAM

## 2024-01-19 RX ADMIN — SENNOSIDES, DOCUSATE SODIUM 1 TABLET: 8.6; 5 TABLET ORAL at 17:29

## 2024-01-19 RX ADMIN — HYDRALAZINE HYDROCHLORIDE 100 MG: 50 TABLET ORAL at 21:05

## 2024-01-19 RX ADMIN — NITROGLYCERIN 20 MCG/MIN: 20 INJECTION INTRAVENOUS at 15:22

## 2024-01-19 RX ADMIN — ATORVASTATIN CALCIUM 40 MG: 40 TABLET, FILM COATED ORAL at 17:29

## 2024-01-19 RX ADMIN — HEPARIN SODIUM 5000 UNITS: 5000 INJECTION INTRAVENOUS; SUBCUTANEOUS at 21:08

## 2024-01-19 RX ADMIN — HEPARIN SODIUM 5000 UNITS: 5000 INJECTION INTRAVENOUS; SUBCUTANEOUS at 13:23

## 2024-01-19 RX ADMIN — SENNOSIDES, DOCUSATE SODIUM 1 TABLET: 8.6; 5 TABLET ORAL at 12:34

## 2024-01-19 RX ADMIN — HYDRALAZINE HYDROCHLORIDE 100 MG: 50 TABLET ORAL at 07:14

## 2024-01-19 RX ADMIN — LISINOPRIL 40 MG: 20 TABLET ORAL at 12:33

## 2024-01-19 RX ADMIN — CALCIUM ACETATE 667 MG: 667 CAPSULE ORAL at 12:34

## 2024-01-19 RX ADMIN — CLONIDINE HYDROCHLORIDE 0.3 MG: 0.2 TABLET ORAL at 21:07

## 2024-01-19 RX ADMIN — HYDRALAZINE HYDROCHLORIDE 20 MG: 20 INJECTION, SOLUTION INTRAMUSCULAR; INTRAVENOUS at 05:30

## 2024-01-19 RX ADMIN — HYDRALAZINE HYDROCHLORIDE 100 MG: 50 TABLET ORAL at 17:28

## 2024-01-19 RX ADMIN — LABETALOL HYDROCHLORIDE 800 MG: 200 TABLET, FILM COATED ORAL at 07:14

## 2024-01-19 RX ADMIN — ESCITALOPRAM OXALATE 20 MG: 20 TABLET ORAL at 21:07

## 2024-01-19 RX ADMIN — ASPIRIN 81 MG CHEWABLE TABLET 81 MG: 81 TABLET CHEWABLE at 12:33

## 2024-01-19 RX ADMIN — LABETALOL HYDROCHLORIDE 800 MG: 200 TABLET, FILM COATED ORAL at 21:07

## 2024-01-19 RX ADMIN — GABAPENTIN 300 MG: 300 CAPSULE ORAL at 13:23

## 2024-01-19 RX ADMIN — CALCIUM ACETATE 667 MG: 667 CAPSULE ORAL at 17:28

## 2024-01-19 RX ADMIN — NIFEDIPINE 90 MG: 30 TABLET, FILM COATED, EXTENDED RELEASE ORAL at 21:17

## 2024-01-19 RX ADMIN — FAMOTIDINE 40 MG: 20 TABLET, FILM COATED ORAL at 12:33

## 2024-01-19 RX ADMIN — CLONIDINE HYDROCHLORIDE 0.3 MG: 0.2 TABLET ORAL at 05:30

## 2024-01-19 RX ADMIN — CLONIDINE HYDROCHLORIDE 0.3 MG: 0.2 TABLET ORAL at 13:23

## 2024-01-19 RX ADMIN — CALCIUM ACETATE 667 MG: 667 CAPSULE ORAL at 07:14

## 2024-01-19 RX ADMIN — LABETALOL HYDROCHLORIDE 800 MG: 200 TABLET, FILM COATED ORAL at 17:29

## 2024-01-19 RX ADMIN — HYDRALAZINE HYDROCHLORIDE 20 MG: 20 INJECTION, SOLUTION INTRAMUSCULAR; INTRAVENOUS at 13:23

## 2024-01-19 RX ADMIN — CINACALCET 60 MG: 30 TABLET ORAL at 12:33

## 2024-01-19 RX ADMIN — HEPARIN SODIUM 5000 UNITS: 5000 INJECTION INTRAVENOUS; SUBCUTANEOUS at 05:30

## 2024-01-19 RX ADMIN — NIFEDIPINE 90 MG: 30 TABLET, FILM COATED, EXTENDED RELEASE ORAL at 07:14

## 2024-01-19 NOTE — ASSESSMENT & PLAN NOTE
Nitro Drip started on 1/16/2023  Currently on weekly clonidine patch , hydralazine 100mg TID, lisinopril 40mg qd, labetalol 800 mg TID, procardia (increased to 60mg am/90mg pm)  Prn IV labetalol and hydralazine - has been receiving multiple doses  Further volume removal by HD  Patient is down 5 pounds  Nitro Drip is titrating down, Patient is almost off nitro drip  Discussed with nephrology. Appreciate input.

## 2024-01-19 NOTE — ASSESSMENT & PLAN NOTE
Right basilar cistern SAH of unclear etiology  BD 22, HH 2, MF 3  S/p Cerebral angiography x 2 - both negative for source of hemorrhage (Dr. Mittal, 1/5/2024, 1/12/2024)  Presented on 1/4/2024 with MONTILLA that started on 12/31/23  Hx ASA use for hx multiple strokes, reversed with DDAVP on arrival  MRIs negative for source of SAH; small scattered acute/subacute infarcts noted.   Pt on hemodialysis for CKD.   1/12-1/13 started with new hand weakness and facial droop - multiple new areas of embolic infarcts to left PCA and bilateral MCA.  Pt remains stable in exam today with R racial drop and distal RUE weakness     Imaging:  MRI brain wo, 1/14/2023: 1. Crescendo multifocal acute/recent infarctions involving at least 3 discrete vascular territories (left posterior cerebral artery and bilateral middle cerebral artery territories), worrisome for multifocal new/interval embolic infarctions. Differential diagnosis could include watershed infarctions in the appropriate clinical setting. These recent/acute infarctions are new/superimposed in the setting of previously present smaller foci of diffusion restriction, indicative of progressive/new interval infarctions since 1/8/2024 2. 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 No significant mass effect. 3. White matter changes likely correlate to areas of recent infarction. 4. Right mastoid air cell effusion. Mastoiditis not excluded.    Plan:  Continue to closely monitor neuro exam   Frequent neuro checks per primary team   Repeat STAT CTH/CTA with any acute decline in GCS > 2pts or more in 1 hr   Maintain normotensive BP goals, SBP < 160   No further neurosurgical intervention indicated at this time   Case and imaging reviewed this am on rounds   Continue ongoing dispo planning   Pt with hx of strokes. Continue ASA 81mg.   DVT ppx: SCDs, SQH   Medical management per primary team    Pain control per primary team   Mobilize as tolerated, PT/OT   Disposition: requesting rehab secondary to new strokes.   Pt was accepted at [facility] and [facility], but unfortunately these facilities will not allow the pt to manage his own Type 1 DM so therefore the pt is understandably refusing to go to rehab at these locations   Referral was placed to [facility] rehab who will allow pt to manage his own insulin  SLIM and CM coordinating dispo and should hear abck on acceptance today or tomorrow  Anticipate discharge to rehab early this week     From a neurosurgical standpoint, the pt is cleared for discharge to rehab. Neurosurgery will follow from the periphery for the remainder of this hospitalization. Will plan to follow-up with the pt again in 6 weeks time with repeat MRI brain w wo contrast and MRA head wo contrast. Please reach out with any further questions or concerns.

## 2024-01-19 NOTE — ASSESSMENT & PLAN NOTE
Right basilar cistern SAH of unclear etiology  BD 20, HH 2, MF 3  S/p Cerebral angiography x 2 - both negative for source of hemorrhage (Dr. Mittal, 1/5/2024, 1/12/2024)  Presented on 1/4/2024 with MONTILLA that started on 12/31/23  Hx ASA use for hx multiple strokes, reversed with DDAVP on arrival  MRIs negative for source of SAH; small scattered acute/subacute infarcts noted.   Pt on hemodialysis for CKD.   1/12-1/13 started with new hand weakness and facial droop - multiple new areas of embolic infarcts to left PCA and bilateral MCA.    Imaging:  MRI brain wo, 1/14/2023: 1. Crescendo multifocal acute/recent infarctions involving at least 3 discrete vascular territories (left posterior cerebral artery and bilateral middle cerebral artery territories), worrisome for multifocal new/interval embolic infarctions. Differential diagnosis could include watershed infarctions in the appropriate clinical setting. These recent/acute infarctions are new/superimposed in the setting of previously present smaller foci of diffusion restriction, indicative of progressive/new interval infarctions since 1/8/2024 2. 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 No significant mass effect. 3. White matter changes likely correlate to areas of recent infarction. 4. Right mastoid air cell effusion. Mastoiditis not excluded.    Plan:  Continue to monitor neuro exam closely.    GCS 15 with RUE weakness and ataxia, right facial weakness.  STAT CTA with decline in GCS > 2 pts in 1 hour.  Maintain SBP < 160 mmHg, continues to have hypertensive episodes overnight.  Pt with hx of strokes. Continue ASA 81mg.   Mobilize as tolerated.  Disposition: requesting [facility] secondary to new strokes.   PM&R consult appreciated. Needs to be able to complete 3h of therapy a day, currently limited 2/2 BP control  DVT ppx: SCD's, HSQ    Neurosurgery is cleared to dc when  medically appropriate to accepting rehab, will follow along in the interim.   Will plan for follow up in 6 weeks time with repeat MRI brain w wo contrast and MRA head wo contrast. Call with questions.

## 2024-01-19 NOTE — PROGRESS NOTES
"[facility]  Progress Note  Name: Mateus Mckeon I  MRN: [id number]  [facility] Date of Admission: 1/5/2024   Date of Service: 1/19/2024 I Hospital Day: 14    Assessment/Plan   * Acute CVA (cerebrovascular accident) (HCC)  Assessment & Plan  RUE weakness with mild facial droop 1/12-13  MRI brain \"Crescendo multifocal acute/recent infarctions involving at least 3 discrete vascular territories (left posterior cerebral artery and bilateral middle cerebral artery territories), worrisome for multifocal new/interval embolic infarctions\"  Continue neuro-checks  ASA 81 mg daily and atorvastatin 40mg daily.   Telemetry.  No events  Echo 1/5 no PFO  Cleared for neurology standpoint   Nsx cleared patient for dvt ppx  Started on heparin  Patient will need to Follow up with Neurosurgery in 6 weeks for MRI  PT/OT/PMR    Hypertensive emergency  Assessment & Plan  Nitro Drip started on 1/16/2023  Currently on weekly clonidine patch , hydralazine 100mg TID, lisinopril 40mg qd, labetalol 800 mg TID, procardia (increased to 60mg am/90mg pm)  Prn IV labetalol and hydralazine - has been receiving multiple doses  Further volume removal by HD  Patient is down 5 pounds  Nitro Drip is titrating down, Patient is almost off nitro drip  Discussed with nephrology. Appreciate input.       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 Mittal, 1/5/2024, 1/12/2024)4.   On ASA for previous strokes reversed with DDAVP.  MRIs negative for source of SAH, small scattered acute/subacute infarcts noted.   CT head wo 1/9/24: Improving subarachnoid hemorrhage compared to recent prior studies. No new findings.     Plan:  ASA 81 mg daily resumed and Nimodipine discontinued per NSX recs 1/12.  No further need for SAH pathway per NSx.  Continue " neurochecks  BP control has been very challenging. Goal of SBP<160      End stage kidney disease ([facility])  Assessment & Plan  Dialysis per nephrology     Anemia in chronic kidney disease, on chronic dialysis ([facility])  Assessment & Plan  Lab Results   Component Value Date    EGFR 8 01/18/2024    EGFR 5 01/17/2024    EGFR 7 01/16/2024    CREATININE 7.06 (H) 01/18/2024    CREATININE 10.79 (H) 01/17/2024    CREATININE 7.91 (H) 01/16/2024   hgb is stable  Continue to monitor   Dialysis MWF    Type 1 diabetes mellitus ([facility])  Assessment & Plan  Lab Results   Component Value Date    HGBA1C 6.0 (H) 01/14/2024       Recent Labs     01/18/24  1657 01/18/24  2104 01/19/24  0004 01/19/24  0528   POCGLU 156* 128 136 95     Managed with insulin pump.    Blood Sugar Average: Last 72 hrs:  (P) 140.2504321284356877    Blood glucose very well controlled  Continue using pump  Continue to monitor                VTE Pharmacologic Prophylaxis: VTE Score: 7 Moderate Risk (Score 3-4) - Pharmacological DVT Prophylaxis Ordered: heparin.    Mobility:   Basic Mobility Inpatient Raw Score: 16  JH-HLM Goal: 5: Stand one or more mins  JH-HLM Achieved: 5: Stand (1 or more minutes)  HLM Goal achieved. Continue to encourage appropriate mobility.    Patient Centered Rounds: I performed bedside rounds with nursing staff today.   Discussions with Specialists or Other Care Team Provider: Nephrology regarding weight challenge to mitigate blood pressure    Education and Discussions with Family / Patient: Updated  (mother) at bedside.    Total Time Spent on Date of Encounter in care of patient: 45 mins. This time was spent on one or more of the following: performing physical exam; counseling and coordination of care; obtaining or reviewing history; documenting in the medical record; reviewing/ordering tests, medications or procedures; communicating with other healthcare professionals and discussing with patient's family/caregivers.    Current Length  of Stay: 14 day(s)  Current Patient Status: Inpatient   Certification Statement: The patient will continue to require additional inpatient hospital stay due to blood pressure control utilizing weight challenge in the setting of a subarachnoid hemorrhage  Discharge Plan: Anticipate discharge in 24-48 hrs to rehab facility.    Code Status: Level 1 - Full Code    Subjective:   Is a very pleasant 38-year-old gentleman who was seen evaluated today at bedside.  Patient just had his dialysis done this morning.  Patient has no further complaints at this time.    Objective:     Vitals:   Temp (24hrs), Av.3 °F (36.8 °C), Min:98.2 °F (36.8 °C), Max:98.6 °F (37 °C)    Temp:  [98.2 °F (36.8 °C)-98.6 °F (37 °C)] 98.6 °F (37 °C)  HR:  [66-77] 73  Resp:  [15-26] 18  BP: (126-183)/() 169/88  SpO2:  [96 %-98 %] 96 %  Body mass index is 34.85 kg/m².     Input and Output Summary (last 24 hours):     Intake/Output Summary (Last 24 hours) at 2024 1551  Last data filed at 2024 1135  Gross per 24 hour   Intake 780 ml   Output 3800 ml   Net -3020 ml       Physical Exam:   Physical Exam  Vitals reviewed.   Constitutional:       General: He is not in acute distress.     Appearance: Normal appearance. He is not ill-appearing.   HENT:      Head: Normocephalic and atraumatic.      Right Ear: External ear normal.      Left Ear: External ear normal.      Nose: Nose normal.   Eyes:      Extraocular Movements: Extraocular movements intact.      Conjunctiva/sclera: Conjunctivae normal.   Cardiovascular:      Rate and Rhythm: Normal rate and regular rhythm.      Pulses: Normal pulses.      Heart sounds: Normal heart sounds.   Pulmonary:      Effort: Pulmonary effort is normal.      Breath sounds: Normal breath sounds.   Abdominal:      General: Abdomen is flat.      Palpations: Abdomen is soft.   Musculoskeletal:         General: Normal range of motion.      Cervical back: Normal range of motion.   Skin:     General: Skin is warm  and dry.   Neurological:      Mental Status: He is alert.      Comments: Right upper extremity weakness   Psychiatric:         Mood and Affect: Mood normal.         Behavior: Behavior normal.          Additional Data:     Labs:  Results from last 7 days   Lab Units 01/18/24  0558   WBC Thousand/uL 7.25   HEMOGLOBIN g/dL 7.7*   HEMATOCRIT % 23.9*   PLATELETS Thousands/uL 242   NEUTROS PCT % 62   LYMPHS PCT % 21   MONOS PCT % 11   EOS PCT % 4     Results from last 7 days   Lab Units 01/18/24  0558   SODIUM mmol/L 139   POTASSIUM mmol/L 4.2   CHLORIDE mmol/L 99   CO2 mmol/L 32   BUN mg/dL 22   CREATININE mg/dL 7.06*   ANION GAP mmol/L 8   CALCIUM mg/dL 7.9*   ALBUMIN g/dL 3.2*   TOTAL BILIRUBIN mg/dL 0.46   ALK PHOS U/L 68   ALT U/L 10   AST U/L 14   GLUCOSE RANDOM mg/dL 146*         Results from last 7 days   Lab Units 01/19/24  1316 01/19/24  0528 01/19/24  0004 01/18/24  2104 01/18/24  1657 01/18/24  0600 01/18/24  0009 01/17/24  1809 01/17/24  1249 01/17/24  0620 01/17/24  0121 01/16/24  1813   POC GLUCOSE mg/dl 141* 95 136 128 156* 110 144* 183* 129 151* 129 196*     Results from last 7 days   Lab Units 01/14/24  0604   HEMOGLOBIN A1C % 6.0*           Lines/Drains:  Invasive Devices       Peripheral Intravenous Line  Duration             Peripheral IV 01/17/24 Right;Ventral (anterior) Forearm 2 days              Line  Duration             Hemodialysis AV Fistula 11/09/20 Left Other (Comment) 1166 days                          Imaging: No pertinent imaging reviewed.    Recent Cultures (last 7 days):         Last 24 Hours Medication List:   Current Facility-Administered Medications   Medication Dose Route Frequency Provider Last Rate    aspirin  81 mg Oral Daily Malu Moore PA-C      atorvastatin  40 mg Oral QPM Radha Steen MD      bisacodyl  10 mg Rectal Daily PRN Radha Steen MD      calcium acetate  667 mg Oral TID With Meals Radha Steen MD      cinacalcet  60 mg Oral Daily Radha Steen MD      cloNIDine  0.3 mg  Oral Q8H [facility] Valeria oFrrester, PA-C      escitalopram  20 mg Oral HS Bryan Claire MD      famotidine  40 mg Oral Daily Radha Steen MD      fentaNYL  25 mcg Intravenous Q3 min PRN Elizabeth Martinez CRNA      gabapentin  300 mg Oral Daily Radha tSeen MD      heparin (porcine)  5,000 Units Subcutaneous Q8H [facility] Bryan Claire MD      hydrALAZINE  20 mg Intravenous Q4H PRN Court Edwards MD      hydrALAZINE  100 mg Oral TID Valeria Forrester, PA-C      insulin lispro  300 Units Subcutaneous Insulin Pump Daily PRN Radha Steen MD      labetalol  20 mg Intravenous Q4H PRN Valeria Forrester, PA-C      labetalol  800 mg Oral TID Valeria Forrester, PA-C      lisinopril  40 mg Oral Daily Valeria Forrester, PA-C      melatonin  6 mg Oral HS PRN Cinthia Dempsey MD      NIFEdipine  90 mg Oral BID AURELIANO Jay-C      nitroGLYcerin  5-200 mcg/min Intravenous Titrated Valeria Forrester PA-C 20 mcg/min (01/19/24 1522)    ondansetron  4 mg Intravenous Q4H PRN Radha Steen MD      oxyCODONE  2.5 mg Oral Q4H PRN Radha Steen MD      Or    oxyCODONE  5 mg Oral Q4H PRN Radha Steen MD      patient maintained insulin pump  1 each Subcutaneous Q8H Radha Steen MD      polyethylene glycol  17 g Oral Daily Radha Steen MD      prochlorperazine  5 mg Oral Q6H PRN Bryan Claire MD      senna-docusate sodium  1 tablet Oral BID Radha Steen MD          Today, Patient Was Seen By: Bryan Claire MD    **Please Note: This note may have been constructed using a voice recognition system.**

## 2024-01-19 NOTE — PLAN OF CARE
Problem: PAIN - ADULT  Goal: Verbalizes/displays adequate comfort level or baseline comfort level  Description: Interventions:  - Encourage patient to monitor pain and request assistance  - Assess pain using appropriate pain scale  - Administer analgesics based on type and severity of pain and evaluate response  - Implement non-pharmacological measures as appropriate and evaluate response  - Consider cultural and social influences on pain and pain management  - Notify physician/advanced practitioner if interventions unsuccessful or patient reports new pain  Outcome: Progressing     Problem: INFECTION - ADULT  Goal: Absence or prevention of progression during hospitalization  Description: INTERVENTIONS:  - Assess and monitor for signs and symptoms of infection  - Monitor lab/diagnostic results  - Monitor all insertion sites, i.e. indwelling lines, tubes, and drains  - Monitor endotracheal if appropriate and nasal secretions for changes in amount and color  - [place] appropriate cooling/warming therapies per order  - Administer medications as ordered  - Instruct and encourage patient and family to use good hand hygiene technique  - Identify and instruct in appropriate isolation precautions for identified infection/condition  Outcome: Progressing     Problem: SAFETY ADULT  Goal: Patient will remain free of falls  Description: INTERVENTIONS:  - Educate patient/family on patient safety including physical limitations  - Instruct patient to call for assistance with activity   - Consult OT/PT to assist with strengthening/mobility   - Keep Call bell within reach  - Keep bed low and locked with side rails adjusted as appropriate  - Keep care items and personal belongings within reach  - Initiate and maintain comfort rounds  - Make Fall Risk Sign visible to staff  - Offer Toileting every 3 Hours, in advance of need  - Initiate/Maintain bed alarm  - Obtain necessary fall risk management equipment:   - Apply yellow socks and  bracelet for high fall risk patients  - Consider moving patient to room near nurses station  Outcome: Progressing  Goal: Maintain or return to baseline ADL function  Description: INTERVENTIONS:  -  Assess patient's ability to carry out ADLs; assess patient's baseline for ADL function and identify physical deficits which impact ability to perform ADLs (bathing, care of mouth/teeth, toileting, grooming, dressing, etc.)  - Assess/evaluate cause of self-care deficits   - Assess range of motion  - Assess patient's mobility; develop plan if impaired  - Assess patient's need for assistive devices and provide as appropriate  - Encourage maximum independence but intervene and supervise when necessary  - Involve family in performance of ADLs  - Assess for home care needs following discharge   - Consider OT consult to assist with ADL evaluation and planning for discharge  - Provide patient education as appropriate  Outcome: Progressing  Goal: Maintains/Returns to pre admission functional level  Description: INTERVENTIONS:  - Perform AM-PAC 6 Click Basic Mobility/ Daily Activity assessment daily.  - Set and communicate daily mobility goal to care team and patient/family/caregiver.   - Collaborate with rehabilitation services on mobility goals if consulted  - Perform Range of Motion 3 times a day.  - Reposition patient every 3 hours.  - Dangle patient 3 times a day  - Stand patient 3 times a day  - Ambulate patient 3 times a day  - Out of bed to chair 3 times a day   - Out of bed for meals 3 times a day  - Out of bed for toileting  - Record patient progress and toleration of activity level   Outcome: Progressing     Problem: DISCHARGE PLANNING  Goal: Discharge to home or other facility with appropriate resources  Description: INTERVENTIONS:  - Identify barriers to discharge w/patient and caregiver  - Arrange for needed discharge resources and transportation as appropriate  - Identify discharge learning needs (meds, wound care,  etc.)  - Arrange for interpretive services to assist at discharge as needed  - Refer to Case Management Department for coordinating discharge planning if the patient needs post-hospital services based on physician/advanced practitioner order or complex needs related to functional status, cognitive ability, or social support system  Outcome: Progressing     Problem: Knowledge Deficit  Goal: Patient/family/caregiver demonstrates understanding of disease process, treatment plan, medications, and discharge instructions  Description: Complete learning assessment and assess knowledge base.  Interventions:  - Provide teaching at level of understanding  - Provide teaching via preferred learning methods  Outcome: Progressing     Problem: Nutrition/Hydration-ADULT  Goal: Nutrient/Hydration intake appropriate for improving, restoring or maintaining nutritional needs  Description: Monitor and assess patient's nutrition/hydration status for malnutrition. Collaborate with interdisciplinary team and initiate plan and interventions as ordered.  Monitor patient's weight and dietary intake as ordered or per policy. Utilize nutrition screening tool and intervene as necessary. Determine patient's food preferences and provide high-protein, high-caloric foods as appropriate.     INTERVENTIONS:  - Monitor oral intake, urinary output, labs, and treatment plans  - Assess nutrition and hydration status and recommend course of action  - Evaluate amount of meals eaten  - Assist patient with eating if necessary   - Allow adequate time for meals  - Recommend/ encourage appropriate diets, oral nutritional supplements, and vitamin/mineral supplements  - Order, calculate, and assess calorie counts as needed  - Recommend, monitor, and adjust tube feedings and TPN/PPN based on assessed needs  - Assess need for intravenous fluids  - Provide specific nutrition/hydration education as appropriate  - Include patient/family/caregiver in decisions related  to nutrition  Outcome: Progressing     Problem: METABOLIC, FLUID AND ELECTROLYTES - ADULT  Goal: Electrolytes maintained within normal limits  Description: INTERVENTIONS:  - Monitor labs and assess patient for signs and symptoms of electrolyte imbalances  - Administer electrolyte replacement as ordered  - Monitor response to electrolyte replacements, including repeat lab results as appropriate  - Instruct patient on fluid and nutrition as appropriate  Outcome: Progressing  Goal: Fluid balance maintained  Description: INTERVENTIONS:  - Monitor labs   - Monitor I/O and WT  - Instruct patient on fluid and nutrition as appropriate  - Assess for signs & symptoms of volume excess or deficit  Outcome: Progressing     Problem: Prexisting or High Potential for Compromised Skin Integrity  Goal: Skin integrity is maintained or improved  Description: INTERVENTIONS:  - Identify patients at risk for skin breakdown  - Assess and monitor skin integrity  - Assess and monitor nutrition and hydration status  - Monitor labs   - Assess for incontinence   - Turn and reposition patient  - Assist with mobility/ambulation  - Relieve pressure over bony prominences  - Avoid friction and shearing  - Provide appropriate hygiene as needed including keeping skin clean and dry  - Evaluate need for skin moisturizer/barrier cream  - Collaborate with interdisciplinary team   - Patient/family teaching  - Consider wound care consult   Outcome: Progressing

## 2024-01-19 NOTE — PROGRESS NOTES
[facility]  Progress Note  Name: Mateus cMkeon I  MRN: [id number]  Unit/Bed#: [facility]PHP 718-01 I Date of Admission: 1/5/2024   Date of Service: 1/19/2024 I Hospital Day: 14    Assessment/Plan   Hypertensive emergency  Assessment & Plan  Per nephrology and medicine  Increased nifedipine 90mg PO BID (max dose), remainder regimen continued  Goal SBP < 160    Subarachnoid hemorrhage (HCC)  Assessment & Plan  Right basilar cistern SAH of unclear etiology  BD 20, HH 2, MF 3  S/p Cerebral angiography x 2 - both negative for source of hemorrhage (Dr. Mittal, 1/5/2024, 1/12/2024)  Presented on 1/4/2024 with MONTILLA that started on 12/31/23  Hx ASA use for hx multiple strokes, reversed with DDAVP on arrival  MRIs negative for source of SAH; small scattered acute/subacute infarcts noted.   Pt on hemodialysis for CKD.   1/12-1/13 started with new hand weakness and facial droop - multiple new areas of embolic infarcts to left PCA and bilateral MCA.    Imaging:  MRI brain wo, 1/14/2023: 1. Crescendo multifocal acute/recent infarctions involving at least 3 discrete vascular territories (left posterior cerebral artery and bilateral middle cerebral artery territories), worrisome for multifocal new/interval embolic infarctions. Differential diagnosis could include watershed infarctions in the appropriate clinical setting. These recent/acute infarctions are new/superimposed in the setting of previously present smaller foci of diffusion restriction, indicative of progressive/new interval infarctions since 1/8/2024 2. 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 No significant mass effect. 3. White matter changes likely correlate to areas of recent infarction. 4. Right mastoid air cell effusion. Mastoiditis not excluded.    Plan:  Continue to monitor neuro exam closely.    GCS 15 with RUE weakness and  ataxia, right facial weakness.  STAT CTA with decline in GCS > 2 pts in 1 hour.  Maintain SBP < 160 mmHg, continues to have hypertensive episodes overnight.  Pt with hx of strokes. Continue ASA 81mg.   Mobilize as tolerated.  Disposition: requesting ARC secondary to new strokes.   PM&R consult appreciated. Needs to be able to complete 3h of therapy a day, currently limited 2/2 BP control  DVT ppx: SCD's, HSQ    Neurosurgery is cleared to dc when medically appropriate to accepting rehab, will follow along in the interim.   Will plan for follow up in 6 weeks time with repeat MRI brain w wo contrast and MRA head wo contrast. Call with questions.      End stage kidney disease ([facility])  Assessment & Plan  Lab Results   Component Value Date    EGFR 8 01/18/2024    EGFR 5 01/17/2024    EGFR 7 01/16/2024    CREATININE 7.06 (H) 01/18/2024    CREATININE 10.79 (H) 01/17/2024    CREATININE 7.91 (H) 01/16/2024     On HD (MWF)    Type 1 diabetes mellitus ([facility])  Assessment & Plan  Lab Results   Component Value Date    HGBA1C 6.0 (H) 01/14/2024       Recent Labs     01/18/24  1657 01/18/24  2104 01/19/24  0004 01/19/24  0528   POCGLU 156* 128 136 95         Blood Sugar Average: Last 72 hrs:  (P) [id number]    Continue management per primary team    * Acute CVA (cerebrovascular accident) ([facility])  Assessment & Plan  Neurology following  Started with new onset right hand weakness and right facial droop 1/12-1/13 concerning for new stroke  MRI brain confirms new multifocal infarcts  CTA head and neck without any acute findings  Asa has been resumed 1/12           Subjective/Objective   Chief Complaint: My right arm still is not working well.    Subjective: Patient continues to endorse issues with his right hand and arm.  Unchanged.  States otherwise he is doing well.  Denies headaches denies any other issues.    Objective: NAD.  Right wrist 3/5, IO/ L1/5, ataxic, right facial droop.    I/O         01/17 0701  01/18 0700 01/18  "0701 01/19 0700 01/19 0701 01/20 0700    P.O.   480    I.V. (mL/kg) 520 (5.5) 55 (0.6) 0 (0)    Total Intake(mL/kg) 520 (5.5) 55 (0.6) 480 (5.1)    Other 4500      Total Output 4500      Net -3980 +55 +480                   Invasive Devices       Peripheral Intravenous Line  Duration             Peripheral IV 01/17/24 Right;Ventral (anterior) Forearm 2 days              Line  Duration             Hemodialysis AV Fistula 11/09/20 Left Other (Comment) 1166 days                    Physical Exam:  Vitals: Blood pressure (!) 171/98, pulse 67, temperature 98.2 °F (36.8 °C), temperature source Oral, resp. rate 15, height 5' 5\" (1.651 m), weight 95 kg (209 lb 7 oz), SpO2 96%.,Body mass index is 34.85 kg/m².    Hemodynamic Monitoring: MAP:      General appearance: alert, appears stated age, cooperative and no distress  Head: Normocephalic, without obvious abnormality, atraumatic  Eyes: EOMI, PERRL  Neck: supple, symmetrical, trachea midline and NT  Back: no kyphosis present, no tenderness to percussion or palpation  Lungs: non labored breathing  Heart: regular heart rate  Neurologic:   Mental status: Alert, oriented x 3, thought content appropriate  Cranial nerves: Right facial droop  Sensory: normal to light touch  Motor: Right wrist 3/5, IO/ 1/5  Reflexes: 2+ and symmetric  Coordination: Right upper extremity ataxia      Lab Results:  Results from last 7 days   Lab Units 01/18/24 0558 01/17/24 0621 01/16/24  0525 01/15/24  0904   WBC Thousand/uL 7.25 12.49* 7.73 8.66   HEMOGLOBIN g/dL 7.7* 8.1* 8.4* 8.9*   HEMATOCRIT % 23.9* 24.4* 25.3* 27.0*   PLATELETS Thousands/uL 242 266 260 254   NEUTROS PCT % 62 75  --  67   MONOS PCT % 11 8  --  8   EOS PCT % 4 4  --  7*     Results from last 7 days   Lab Units 01/18/24 0558 01/17/24 0621 01/16/24  0525   SODIUM mmol/L 139 136 138   POTASSIUM mmol/L 4.2 4.9 4.7   CHLORIDE mmol/L 99 98 101   CO2 mmol/L 32 27 28   BUN mg/dL 22 37* 28*   CREATININE mg/dL 7.06* 10.79* 7.91* " "  CALCIUM mg/dL 7.9* 7.9* 8.0*   ALK PHOS U/L 68 74  --    ALT U/L 10 13  --    AST U/L 14 14  --                  No results found for: \"TROPONINT\"  ABG:  Lab Results   Component Value Date    PHART 7.454 (H) 02/21/2020    FLY9IMW 33.2 (L) 02/21/2020    PO2ART 170.0 (H) 02/21/2020    YKX3WPK 22.8 02/21/2020    BEART -0.8 02/21/2020    SOURCE Radial, Right 02/21/2020       Imaging Studies: I have personally reviewed pertinent reports.   and I have personally reviewed pertinent films in PACS    CT head wo contrast    Result Date: 1/9/2024  Impression: Improving subarachnoid hemorrhage compared to recent prior studies. No new findings. Other stable and nonemergent findings above. Workstation performed: [id number]     MRI brain w wo contrast    Result Date: 1/8/2024  Impression: Stable hematoma in the right CP angle, prepontine and premedullary cisterns. New subarachnoid hemorrhage over the convexities and within the lateral ventricles likely due to redistribution. Few tiny scattered recent infarcts in multiple vascular distributions. Study marked in epic for notification. Workstation performed: [id number]     MRI cervical spine w wo contrast    Result Date: 1/8/2024  Impression: No identifiable etiology for subarachnoid hemorrhage. Mild congenital canal stenosis. New marrow edema at C5-C6 with severe disc space narrowing that is similar to recent CT. Modic type I endplate degenerative changes favored over infection but recommend clinical correlation and follow-up imaging if warranted. Study marked in [employer] for notification. Workstation performed: [id number]     CTA head and neck with and without contrast    Result Date: 1/5/2024  Impression: 1.  Small amount of subarachnoid hemorrhage in the right basilar cistern region is again seen without significant change since the prior CT brain exam of the same day. No enhancing brain mass/fluid collection or evidence of vascular malformation. Major intracranial dural venous " sinuses are grossly patent. 2.  Mild scattered calcific atherosclerosis of the carotid bifurcation and cavernous carotid segments bilaterally without significant stenosis. Bilateral major cervical/intracranial arteries are patent with normal course and caliber. No arterial occlusion, significant flow-limiting stenosis, or focal saccular aneurysm identified 3.  Nonspecific 4 mm right upper lobe lung nodule is seen. No routine follow-up imaging recommended per current Fleischner Society guidelines for low risk patient. 4.  Opacification of the right mastoid air cells could be secondary to mastoid effusion or mastoiditis, recommend clinical correlation with ENT exam findings. Left mastoid air cells and paranasal sinuses appear well aerated and clear. Workstation performed: [id number]     CT head without contrast    Result Date: 1/5/2024  Impression: 1.  Right prepontine/premedullary cistern subarachnoid hemorrhage. Minimal mass effect on the right middle cerebellar peduncle. Short-term follow-up is recommended. 2.  Opacification of the right mastoid air cells correlate clinically for mastoiditis. I personally discussed this study with REYNALDO MULLER on 1/5/2024 3:43 AM. Workstation performed: [id number]       EKG, Pathology, and Other Studies: I have personally reviewed pertinent reports.      VTE Pharmacologic Prophylaxis: Sequential compression device (Venodyne)  and Heparin    VTE Mechanical Prophylaxis: sequential compression device

## 2024-01-19 NOTE — PLAN OF CARE
Post-Dialysis RN Treatment Note    Blood Pressure:  Pre 152/95 mm/Hg  Post 174/97 mmHg   EDW  TBD kg    Weight:  Pre 95 kg   Post 91.3 kg   Mode of weight measurement: Bed Scale   Volume Removed  3,500 ml    Treatment duration 180 minutes    NS given  No    Treatment shortened? Yes, describe: by 30 minutes. 3 hours dialysis tx done instead of 3.5 hours per order. Dr. Reyes aware and agreeable d/t staffing and patient census.   Medications given during Rx None Reported   Estimated Kt/V  0.98   Access type: AV fistula   Access Issues: No    Report called to primary nurse   Yes, via phone call to Kaylin PITTMAN RN      Started patient on hemodialysis treatment with UF goal of 3.5L net as tolerated per HD order x 3 hours x 3K bath for serum k+ of 4.2 on 1/18/24.    Problem: METABOLIC, FLUID AND ELECTROLYTES - ADULT  Goal: Electrolytes maintained within normal limits  Description: INTERVENTIONS:  - Monitor labs and assess patient for signs and symptoms of electrolyte imbalances  - Administer electrolyte replacement as ordered  - Monitor response to electrolyte replacements, including repeat lab results as appropriate  - Instruct patient on fluid and nutrition as appropriate  Outcome: Progressing  Goal: Fluid balance maintained  Description: INTERVENTIONS:  - Monitor labs   - Monitor I/O and WT  - Instruct patient on fluid and nutrition as appropriate  - Assess for signs & symptoms of volume excess or deficit  Outcome: Progressing

## 2024-01-19 NOTE — ASSESSMENT & PLAN NOTE
Lab Results   Component Value Date    HGBA1C 6.0 (H) 01/14/2024       Recent Labs     01/18/24  1657 01/18/24  2104 01/19/24  0004 01/19/24  0528   POCGLU 156* 128 136 95     Managed with insulin pump.    Blood Sugar Average: Last 72 hrs:  (P) [id number]    Blood glucose very well controlled  Continue using pump  Continue to monitor

## 2024-01-19 NOTE — PROGRESS NOTES
NEPHROLOGY PROGRESS NOTE   Mateus Mckeon 38 y.o. male MRN: [id number]  Unit/Bed#: [facility]PHP 718-01 Encounter: [id number]  Reason for Consult: ESRD    ASSESSMENT AND PLAN:  39 yo man with PMH of ESRD on HD at [facility] on, MWF p/w headaches.  Nephrology is consulted for management of ESRD     PLAN:     #ESRD on HD MWF:  Dialysis unit/days: [facility]  Access: AV fistula  Currently on dialysis, well-tolerated  Will do dialysis 3 hours due to nursing staff issues with weather and transportation   Goal 3.5 L of UF  Fluid restriction 1-1.5L/d  Adjust medications to GFR<10  Avoid opioids     #Proteniruia   UACr 427.9  Etiology: Likely related to diabetic lymphadenopathy     #Volume status/uncontrolled hypertension:  Volume fluid overload  Blood pressure: Hypertensive, /98, goal less than 140/90   UF on HD today, aim for 3.5 LBP regimen   clonidine switch to tid   Hydralazine 100 3 times daily  Labetalol 800mg 3 times daily  Lisinopril 40 mg  Continue nifedipine 90 mg twice daily    On nitro, plan is to titrate down   Low-sodium   Fluid restriction as above     #Secondary Hyperparasitoidism   Cinacalcet 60mg   PhosLo with meals     # Anemia of Kidney Disease  Current hemoglobin: 7.7 mg/dL  Treatment:  Transfuse for hemoglobin less than 7.0 per primary service        # Subarachnoid hemorrhage  Management as per neurosurgery  Stable        #DM  HbA1c 6  Advised to maintain a good DM control to prevent progression of CKD   Maintain healthy diet (vegetables, fruits, whole grains, nonfat or low fat)  Weight loss  Physical activity (5 to 10 minutes to start the increase to 30 min a day)       # Subarachnoid hemorrhage  On ASA 81 mg  BP above goal despite of aggressive BP meds      HEMODIALYSIS PROCEDURE NOTE  The patient was seen and examined on hemodialysis. The patient is tolerating the procedure well.  Time: 3 hours  Sodium: 135 Blood flow: 400   Dialyzer: F160 Potassium: 3 Dialysate flow: 600   Access: AVF Bicarbonate:  35 Ultrafiltration goal: 3.5L   Medications on HD: none        SUBJECTIVE:  Patient seen and examined at bedside during HD well tolerated.       OBJECTIVE:  Current Weight: Weight - Scale: 95 kg (209 lb 7 oz)  Vitals:    01/19/24 1030   BP: 169/98   Pulse: 68   Resp: 15   Temp:    SpO2:        Intake/Output Summary (Last 24 hours) at 1/19/2024 1058  Last data filed at 1/19/2024 0830  Gross per 24 hour   Intake 535 ml   Output --   Net 535 ml     Wt Readings from Last 3 Encounters:   01/19/24 95 kg (209 lb 7 oz)   01/13/24 94.8 kg (209 lb)   01/04/24 95.3 kg (210 lb 1.6 oz)     Temp Readings from Last 3 Encounters:   01/19/24 98.2 °F (36.8 °C) (Oral)   01/04/24 97.8 °F (36.6 °C) (Oral)   01/03/24 97.8 °F (36.6 °C) (Oral)     BP Readings from Last 3 Encounters:   01/19/24 169/98   01/05/24 (!) 194/97   01/03/24 143/69     Pulse Readings from Last 3 Encounters:   01/19/24 68   01/05/24 80   01/03/24 72        General:  no acute distress at this time  Skin:  No acute rash  Eyes:  No scleral icterus and noninjected  ENT:  mucous membranes moist  Neck:  no carotid bruits  Chest:  Clear to auscultation percussion, good respiratory effort, no use of accessory respiratory muscles  CVS:  Regular rate and rhythm without rub   Abdomen:  soft and nontender   Extremities: lower extremity edema  Neuro:  No gross focality  Psych:  Alert , cooperative   Vascular access: AVF with good brui an thrill       Medications:    Current Facility-Administered Medications:     aspirin chewable tablet 81 mg, 81 mg, Oral, Daily, Malu Moore PA-C, 81 mg at 01/18/24 0808    atorvastatin (LIPITOR) tablet 40 mg, 40 mg, Oral, QPM, Radha Steen MD, 40 mg at 01/18/24 1705    bisacodyl (DULCOLAX) rectal suppository 10 mg, 10 mg, Rectal, Daily PRN, Radha Steen MD    calcium acetate (PHOSLO) capsule 667 mg, 667 mg, Oral, TID With Meals, Radha Steen MD, 667 mg at 01/19/24 0714    cinacalcet (SENSIPAR) tablet 60 mg, 60 mg, Oral, Daily, Radha Steen MD, 60 mg  at 01/18/24 0808    cloNIDine (CATAPRES) tablet 0.3 mg, 0.3 mg, Oral, Q8H HALIE, Valeria Forrester PA-C, 0.3 mg at 01/19/24 0530    escitalopram (LEXAPRO) tablet 20 mg, 20 mg, Oral, HS, Bryan Claire MD, 20 mg at 01/18/24 2115    famotidine (PEPCID) tablet 40 mg, 40 mg, Oral, Daily, Radha Steen MD, 40 mg at 01/18/24 0808    fentaNYL (SUBLIMAZE) injection 25 mcg, 25 mcg, Intravenous, Q3 min PRN, Elizabeth Martinez CRNA    gabapentin (NEURONTIN) capsule 300 mg, 300 mg, Oral, Daily, Radha Steen MD, 300 mg at 01/18/24 1406    heparin (porcine) subcutaneous injection 5,000 Units, 5,000 Units, Subcutaneous, Q8H HALIE, Bryan Claire MD, 5,000 Units at 01/19/24 0530    hydrALAZINE (APRESOLINE) injection 20 mg, 20 mg, Intravenous, Q4H PRN, Court Edwards MD, 20 mg at 01/19/24 0530    hydrALAZINE (APRESOLINE) tablet 100 mg, 100 mg, Oral, TID, Valeria Forrester PA-C, 100 mg at 01/19/24 0714    insulin lispro (HumaLOG) FOR PUMP REFILLS 300 Units, 300 Units, Subcutaneous Insulin Pump, Daily PRN, Radha Steen MD    labetalol (NORMODYNE) injection 20 mg, 20 mg, Intravenous, Q4H PRN, Valeria Forrester PA-C, 20 mg at 01/17/24 0410    labetalol (NORMODYNE) tablet 800 mg, 800 mg, Oral, TID, Valeria Forrester PA-C, 800 mg at 01/19/24 0714    lisinopril (ZESTRIL) tablet 40 mg, 40 mg, Oral, Daily, Valeria Forrester PA-C, 40 mg at 01/18/24 0809    melatonin tablet 6 mg, 6 mg, Oral, HS PRN, Cinthia Dempsey MD    NIFEdipine (PROCARDIA XL) 24 hr tablet 90 mg, 90 mg, Oral, BID, Valeria Forrester PA-C, 90 mg at 01/19/24 0714    nitroGLYcerin (TRIDIL) 50 mg in 250 mL infusion (premix), 5-200 mcg/min, Intravenous, Titrated, Valeria Forrester PA-C, Last Rate: 6 mL/hr at 01/19/24 0217, 20 mcg/min at 01/19/24 0217    ondansetron (ZOFRAN) injection 4 mg, 4 mg, Intravenous, Q4H PRN, Radha Steen MD, 4 mg at 01/14/24 1351    oxyCODONE (ROXICODONE) split tablet 2.5 mg, 2.5 mg, Oral, Q4H PRN, 2.5 mg at 01/11/24 2001 **OR** oxyCODONE (ROXICODONE) IR  tablet 5 mg, 5 mg, Oral, Q4H PRN, Radha Steen MD, 5 mg at 01/18/24 1721    PATIENT MAINTAINED INSULIN PUMP 1 each, 1 each, Subcutaneous, Q8H, Radha Steen MD, 1.7 each at 01/19/24 0715    polyethylene glycol (MIRALAX) packet 17 g, 17 g, Oral, Daily, Radha Steen MD, 17 g at 01/18/24 0812    prochlorperazine (COMPAZINE) tablet 5 mg, 5 mg, Oral, Q6H PRN, Bryan Claire MD    senna-docusate sodium (SENOKOT S) 8.6-50 mg per tablet 1 tablet, 1 tablet, Oral, BID, Radha Steen MD, 1 tablet at 01/18/24 1705    Laboratory Results:  Results from last 7 days   Lab Units 01/18/24  0558 01/17/24  0621 01/16/24  0525 01/15/24  0904 01/14/24  0604 01/13/24  0548   WBC Thousand/uL 7.25 12.49* 7.73 8.66 9.53 7.83   HEMOGLOBIN g/dL 7.7* 8.1* 8.4* 8.9* 9.7* 9.5*   HEMATOCRIT % 23.9* 24.4* 25.3* 27.0* 28.6* 28.5*   PLATELETS Thousands/uL 242 266 260 254 257 273   SODIUM mmol/L 139 136 138 132* 136 138   POTASSIUM mmol/L 4.2 4.9 4.7 5.1 4.5 4.3   CHLORIDE mmol/L 99 98 101 94* 97 98   CO2 mmol/L 32 27 28 25 23 27   BUN mg/dL 22 37* 28* 52* 39* 29*   CREATININE mg/dL 7.06* 10.79* 7.91* 11.46* 9.56* 7.07*   CALCIUM mg/dL 7.9* 7.9* 8.0* 7.9* 7.9* 7.7*       VAS renal artery complete   Final Result by Placido Altamirano MD (01/16 1055)      MRI brain wo contrast   Final Result by Willis Huddleston MD (01/14 1311)         1. Crescendo multifocal acute/recent infarctions involving at least 3 discrete vascular territories (left posterior cerebral artery and bilateral middle cerebral artery territories), worrisome for multifocal new/interval embolic infarctions. Differential    diagnosis could include watershed infarctions in the appropriate clinical setting. These recent/acute infarctions are new/superimposed in the setting of previously present smaller foci of diffusion restriction, indicative of progressive/new interval    infarctions since 1/8/2024   2. Scattered siderosis with trace residual subarachnoid hemorrhage in the left frontoparietal  junction similar to the CT from yesterday afternoon. No significant mass effect.   3. White matter changes likely correlate to areas of recent infarction.   4. Right mastoid air cell effusion. Mastoiditis not excluded.      Workstation performed: [id number]         CTA head and neck w wo contrast   Final Result by Justin Ho MD (01/13 1415)      CT brain:  Improved scattered subarachnoid hemorrhages (better delineated on the recent prior MRI examination) with no new intra or extra-axial hemorrhage.      CTA head: Negative for large vessel intracranial occlusion or hemodynamically significant stenosis.      CTA neck:  No extracranial carotid stenosis.  The cervical vertebral arteries are patent.         Workstation performed: [id number]         VAS transcranial doppler, complete study   Final Result by Dayne Burden DO (01/13 1147)      IR cerebral angiography   Final Result by Yanira Moss (01/15 1046)      VAS transcranial doppler, complete study   Final Result by Ashley Poe MD (01/11 1121)      VAS transcranial doppler, complete study   Final Result by Ashley Poe MD (01/10 2204)      CT head wo contrast   Final Result by Makenzie Calvin MD (01/09 1453)      Improving subarachnoid hemorrhage compared to recent prior studies. No new findings.      Other stable and nonemergent findings above.         Workstation performed: [id number]         VAS transcranial doppler, complete study   Final Result by Dennis Paul DO (01/09 1147)      VAS transcranial doppler, complete study   Final Result by Dennis Paul DO (01/08 1614)      MRI brain w wo contrast   Final Result by E. Alec Schoenberger, MD (01/08 0859)      Stable hematoma in the right CP angle, prepontine and premedullary cisterns. New subarachnoid hemorrhage over the convexities and within the lateral ventricles likely due to redistribution.   Few tiny scattered recent infarcts in multiple vascular distributions.      Study marked in epic  "for notification.      Workstation performed: [id number]         MRI cervical spine w wo contrast   Final Result by E. Alec Schoenberger, MD (01/08 0858)      No identifiable etiology for subarachnoid hemorrhage.   Mild congenital canal stenosis.   New marrow edema at C5-C6 with severe disc space narrowing that is similar to recent CT. Modic type I endplate degenerative changes favored over infection but recommend clinical correlation and follow-up imaging if warranted.      Study marked in epic for notification.      Workstation performed: [id number]         VAS transcranial doppler, complete study   Final Result by Priscila Chavez MD (01/07 1736)      VAS transcranial doppler, complete study   Final Result by Priscila Chavez MD (01/07 1736)      IR cerebral angiography / intervention   Final Result by Yanira Moss (01/08 1407)      VAS transcranial doppler, complete study   Final Result by Dayne Burden DO (01/05 1442)      MRA head wo contrast    (Results Pending)   MRI brain w wo contrast    (Results Pending)   VAS transcranial doppler, complete study    (Results Pending)       Portions of the record may have been created with voice recognition software. Occasional wrong word or \"sound a like\" substitutions may have occurred due to the inherent limitations of voice recognition software. Read the chart carefully and recognize, using context, where substitutions have occurred.    "

## 2024-01-19 NOTE — CASE MANAGEMENT
Case Management Discharge Planning Note    Patient name Mateus Mckeon  Location [facility] 718/[facility] 718-01 MRN [id number]  : 1985 Date 2024       Current Admission Date: 2024  Current Admission Diagnosis:Acute CVA (cerebrovascular accident) ([facility])   Patient Active Problem List    Diagnosis Date Noted    Acute CVA (cerebrovascular accident) ([facility]) 2024    Hypertensive emergency 2024    Abnormal finding on MRI of brain 2024    Subarachnoid hemorrhage (HCC) 2024    Anemia due to chronic kidney disease, on chronic dialysis ([facility]) 2024    Type 1 diabetes mellitus on insulin therapy ([facility]) 2023    Hyperlipidemia 2023    Anxiety 2023    Insomnia 2023    RACHEAL (obstructive sleep apnea)     Status post placement of implantable loop recorder 2022    Coronary artery disease involving native coronary artery of native heart without angina pectoris 2022    Pulmonary HTN ([facility]) 2022    Hypothyroidism 2022    Cerebellar stroke syndrome 2022    Anemia in chronic kidney disease 2022    Essential (primary) hypertension 2022    Numbness of arm 2021    Mild episode of recurrent major depressive disorder ([facility]) 2021    Generalized anxiety disorder 2021    Medical cannabis use 04/15/2021    Tubulovillous adenoma of colon 2021    GERD (gastroesophageal reflux disease) 2021    End stage kidney disease ([facility]) 2020    Secondary hyperparathyroidism ([facility]) 10/23/2020    Diabetic neuropathy ([facility]) 2020    Provoked seizure ([facility]) 2020    Asterixis 2020    Foot drop, bilateral 2020    Impaired mobility and ADLs 2020    Vertigo 2020    Multiple pulmonary nodules 2019    Gastroparesis diabeticorum  2019    Pruritus 2019    Environmental and seasonal allergies 2019    Strabismus 2018    Dyslipidemia 2018    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    Renovascular hypertension 03/26/2018    Left atrial dilation 02/27/2018    Bilateral leg edema 02/19/2018    Persistent proteinuria 02/11/2018    Anemia in chronic kidney disease, on chronic dialysis (HCC) 02/10/2018    Hypertension 02/09/2018    Homozygous MTHFR mutation [id number] 02/05/2018    Hyperphosphatemia 01/29/2018    Vitamin D deficiency 01/29/2018    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,   01/28/2018    Binocular visual disturbance 01/28/2018    History of lacunar cerebrovascular accident (CVA) 01/22/2018    Type 1 diabetes mellitus (HCC) 06/19/2017    Ataxia 07/21/2015    Gastroenteritis 07/21/2015      LOS (days): 14  Geometric Mean LOS (GMLOS) (days): 4.6  Days to GMLOS:-9.9     OBJECTIVE:  Risk of Unplanned Readmission Score: 36.06         Current admission status: Inpatient   Preferred Pharmacy:   [facility] [address] [place] [id number]  Phone: [phone] Fax: [phone]    [id number] [address] [address] [facility] [address]  Phone: [phone] Fax: [phone]    Primary Care Provider: Dania Sher DO    Primary Insurance: MEDICARE  Secondary Insurance: [facility]    DISCHARGE DETAILS:       Other Referral/Resources/Interventions Provided:  Referral Comments: Acute referrals completed             Additional Comments: CM spoke with pt mother Francine re: DCP. Francine requsted a referral to [facility] since pt endo is with [facility]. CM reviewed [facility] and [facility]. Pt wants to manage his own pump while in rehab. Both [facility] do not allow this. Francine smith she spoke with [facility] and pt would be allowed to manage, Francine radha momin nothing would happen until Monday. CM to f/u

## 2024-01-19 NOTE — ASSESSMENT & PLAN NOTE
Per nephrology and medicine  Increased nifedipine 90mg PO BID (max dose), remainder regimen continued  Goal SBP < 160

## 2024-01-19 NOTE — ASSESSMENT & PLAN NOTE
Lab Results   Component Value Date    HGBA1C 6.0 (H) 01/14/2024       Recent Labs     01/18/24  1657 01/18/24  2104 01/19/24  0004 01/19/24  0528   POCGLU 156* 128 136 95         Blood Sugar Average: Last 72 hrs:  (P) [id number]    Continue management per primary team

## 2024-01-19 NOTE — RESTORATIVE TECHNICIAN NOTE
Restorative Technician Note      Patient Name: Mateus Mckeon     Note Type: Mobility (Pt currently off the unit at dialysis per nursing.)      Savi YIN, Restorative Technician,

## 2024-01-20 LAB
ALBUMIN SERPL BCP-MCNC: 3.4 G/DL (ref 3.5–5)
ALP SERPL-CCNC: 72 U/L (ref 34–104)
ALT SERPL W P-5'-P-CCNC: 11 U/L (ref 7–52)
ANION GAP SERPL CALCULATED.3IONS-SCNC: 9 MMOL/L
AST SERPL W P-5'-P-CCNC: 12 U/L (ref 13–39)
BASOPHILS # BLD AUTO: 0.05 THOUSANDS/ÂΜL (ref 0–0.1)
BASOPHILS NFR BLD AUTO: 1 % (ref 0–1)
BILIRUB SERPL-MCNC: 0.46 MG/DL (ref 0.2–1)
BUN SERPL-MCNC: 27 MG/DL (ref 5–25)
CALCIUM ALBUM COR SERPL-MCNC: 8.4 MG/DL (ref 8.3–10.1)
CALCIUM SERPL-MCNC: 7.9 MG/DL (ref 8.4–10.2)
CHLORIDE SERPL-SCNC: 101 MMOL/L (ref 96–108)
CO2 SERPL-SCNC: 29 MMOL/L (ref 21–32)
CREAT SERPL-MCNC: 7.42 MG/DL (ref 0.6–1.3)
EOSINOPHIL # BLD AUTO: 0.26 THOUSAND/ÂΜL (ref 0–0.61)
EOSINOPHIL NFR BLD AUTO: 5 % (ref 0–6)
ERYTHROCYTE [DISTWIDTH] IN BLOOD BY AUTOMATED COUNT: 14.3 % (ref 11.6–15.1)
GFR SERPL CREATININE-BSD FRML MDRD: 8 ML/MIN/1.73SQ M
GLUCOSE SERPL-MCNC: 120 MG/DL (ref 65–140)
GLUCOSE SERPL-MCNC: 126 MG/DL (ref 65–140)
GLUCOSE SERPL-MCNC: 127 MG/DL (ref 65–140)
GLUCOSE SERPL-MCNC: 135 MG/DL (ref 65–140)
GLUCOSE SERPL-MCNC: 139 MG/DL (ref 65–140)
GLUCOSE SERPL-MCNC: 242 MG/DL (ref 65–140)
HCT VFR BLD AUTO: 25.6 % (ref 36.5–49.3)
HGB BLD-MCNC: 8.4 G/DL (ref 12–17)
IMM GRANULOCYTES # BLD AUTO: 0.04 THOUSAND/UL (ref 0–0.2)
IMM GRANULOCYTES NFR BLD AUTO: 1 % (ref 0–2)
LYMPHOCYTES # BLD AUTO: 1.32 THOUSANDS/ÂΜL (ref 0.6–4.47)
LYMPHOCYTES NFR BLD AUTO: 24 % (ref 14–44)
MCH RBC QN AUTO: 31.9 PG (ref 26.8–34.3)
MCHC RBC AUTO-ENTMCNC: 32.8 G/DL (ref 31.4–37.4)
MCV RBC AUTO: 97 FL (ref 82–98)
MONOCYTES # BLD AUTO: 0.48 THOUSAND/ÂΜL (ref 0.17–1.22)
MONOCYTES NFR BLD AUTO: 9 % (ref 4–12)
NEUTROPHILS # BLD AUTO: 3.34 THOUSANDS/ÂΜL (ref 1.85–7.62)
NEUTS SEG NFR BLD AUTO: 60 % (ref 43–75)
NRBC BLD AUTO-RTO: 0 /100 WBCS
PLATELET # BLD AUTO: 248 THOUSANDS/UL (ref 149–390)
PMV BLD AUTO: 10.3 FL (ref 8.9–12.7)
POTASSIUM SERPL-SCNC: 4.7 MMOL/L (ref 3.5–5.3)
PROT SERPL-MCNC: 5.3 G/DL (ref 6.4–8.4)
RBC # BLD AUTO: 2.63 MILLION/UL (ref 3.88–5.62)
SODIUM SERPL-SCNC: 139 MMOL/L (ref 135–147)
WBC # BLD AUTO: 5.49 THOUSAND/UL (ref 4.31–10.16)

## 2024-01-20 PROCEDURE — 82948 REAGENT STRIP/BLOOD GLUCOSE: CPT

## 2024-01-20 PROCEDURE — 85025 COMPLETE CBC W/AUTO DIFF WBC: CPT | Performed by: FAMILY MEDICINE

## 2024-01-20 PROCEDURE — 99232 SBSQ HOSP IP/OBS MODERATE 35: CPT | Performed by: INTERNAL MEDICINE

## 2024-01-20 PROCEDURE — 80053 COMPREHEN METABOLIC PANEL: CPT | Performed by: FAMILY MEDICINE

## 2024-01-20 PROCEDURE — 99232 SBSQ HOSP IP/OBS MODERATE 35: CPT | Performed by: FAMILY MEDICINE

## 2024-01-20 RX ADMIN — FAMOTIDINE 40 MG: 20 TABLET, FILM COATED ORAL at 08:37

## 2024-01-20 RX ADMIN — LABETALOL HYDROCHLORIDE 800 MG: 200 TABLET, FILM COATED ORAL at 17:21

## 2024-01-20 RX ADMIN — HYDRALAZINE HYDROCHLORIDE 100 MG: 50 TABLET ORAL at 17:21

## 2024-01-20 RX ADMIN — HEPARIN SODIUM 5000 UNITS: 5000 INJECTION INTRAVENOUS; SUBCUTANEOUS at 22:45

## 2024-01-20 RX ADMIN — NIFEDIPINE 90 MG: 30 TABLET, FILM COATED, EXTENDED RELEASE ORAL at 20:58

## 2024-01-20 RX ADMIN — SENNOSIDES, DOCUSATE SODIUM 1 TABLET: 8.6; 5 TABLET ORAL at 17:21

## 2024-01-20 RX ADMIN — CLONIDINE HYDROCHLORIDE 0.3 MG: 0.2 TABLET ORAL at 22:45

## 2024-01-20 RX ADMIN — CLONIDINE HYDROCHLORIDE 0.3 MG: 0.2 TABLET ORAL at 06:07

## 2024-01-20 RX ADMIN — HYDRALAZINE HYDROCHLORIDE 100 MG: 50 TABLET ORAL at 08:37

## 2024-01-20 RX ADMIN — ATORVASTATIN CALCIUM 40 MG: 40 TABLET, FILM COATED ORAL at 17:21

## 2024-01-20 RX ADMIN — CLONIDINE HYDROCHLORIDE 0.3 MG: 0.2 TABLET ORAL at 13:22

## 2024-01-20 RX ADMIN — POLYETHYLENE GLYCOL 3350 17 G: 17 POWDER, FOR SOLUTION ORAL at 08:42

## 2024-01-20 RX ADMIN — ESCITALOPRAM OXALATE 20 MG: 20 TABLET ORAL at 22:45

## 2024-01-20 RX ADMIN — CALCIUM ACETATE 667 MG: 667 CAPSULE ORAL at 08:42

## 2024-01-20 RX ADMIN — HYDRALAZINE HYDROCHLORIDE 20 MG: 20 INJECTION, SOLUTION INTRAMUSCULAR; INTRAVENOUS at 06:07

## 2024-01-20 RX ADMIN — LABETALOL HYDROCHLORIDE 800 MG: 200 TABLET, FILM COATED ORAL at 21:01

## 2024-01-20 RX ADMIN — CALCIUM ACETATE 667 MG: 667 CAPSULE ORAL at 17:21

## 2024-01-20 RX ADMIN — CALCIUM ACETATE 667 MG: 667 CAPSULE ORAL at 13:22

## 2024-01-20 RX ADMIN — HEPARIN SODIUM 5000 UNITS: 5000 INJECTION INTRAVENOUS; SUBCUTANEOUS at 13:23

## 2024-01-20 RX ADMIN — LABETALOL HYDROCHLORIDE 800 MG: 200 TABLET, FILM COATED ORAL at 08:36

## 2024-01-20 RX ADMIN — CINACALCET 60 MG: 30 TABLET ORAL at 08:37

## 2024-01-20 RX ADMIN — NIFEDIPINE 90 MG: 30 TABLET, FILM COATED, EXTENDED RELEASE ORAL at 08:36

## 2024-01-20 RX ADMIN — ASPIRIN 81 MG CHEWABLE TABLET 81 MG: 81 TABLET CHEWABLE at 08:37

## 2024-01-20 RX ADMIN — GABAPENTIN 300 MG: 300 CAPSULE ORAL at 13:22

## 2024-01-20 RX ADMIN — HEPARIN SODIUM 5000 UNITS: 5000 INJECTION INTRAVENOUS; SUBCUTANEOUS at 06:07

## 2024-01-20 RX ADMIN — HYDRALAZINE HYDROCHLORIDE 100 MG: 50 TABLET ORAL at 20:58

## 2024-01-20 RX ADMIN — SENNOSIDES, DOCUSATE SODIUM 1 TABLET: 8.6; 5 TABLET ORAL at 08:37

## 2024-01-20 RX ADMIN — LISINOPRIL 40 MG: 20 TABLET ORAL at 08:36

## 2024-01-20 NOTE — PROGRESS NOTES
NEPHROLOGY PROGRESS NOTE   Mateus Mckeon 38 y.o. male MRN: [id number]  [facility] Encounter: [id number]      ASSESSMENT & PLAN:  #1 ESRD on HD MWF at [facility].  For hemodialysis Monday and we will continue UF as tolerated    2 acute CVA, neurology on board    #3 uncontrolled HTN, blood pressure is still elevated continue current regimen, continue UF as tolerated during dialysis    #4 anemia of chronic kidney disease, HETAL on hold in the setting of uncontrolled hypertension as well as recent CVA    5.  Mineral bone disease, continue with renal diet, PhosLo with meals, Sensipar    6 Access, AV fistula in place      SUBJECTIVE:  See and examined, family member at bedside, frustrated because his last HD treatment was only 3 hours yesterday due to weather and then he needed to wait another 30 minutes in the inpatient dialysis unit.  Denies significant complaints, no chest pain, no shortness of breath, no nausea or vomiting    OBJECTIVE:  Current Weight: Weight - Scale: 95 kg (209 lb 7 oz)  Vitals:    01/20/24 0816   BP: 167/88   Pulse: 72   Resp:    Temp: 98.4 °F (36.9 °C)   SpO2: 99%       Intake/Output Summary (Last 24 hours) at 1/20/2024 1303  Last data filed at 1/19/2024 1729  Gross per 24 hour   Intake 120 ml   Output --   Net 120 ml       General: conscious, cooperative, in not acute distress  Eyes: conjunctivae pink, anicteric sclerae  ENT: lips and mucous membranes moist  Neck: supple, no JVD  Chest: clear breath sounds bilateral, no crackles, ronchus or wheezings  CVS: distinct S1 & S2, normal rate, regular rhythm  Abdomen: soft, non-tender, non-distended, normoactive bowel sounds  Extremities: Positive edema of both legs  Skin: no rash  Neuro: awake, alert, oriented      Medications:    Current Facility-Administered Medications:     aspirin chewable tablet 81 mg, 81 mg, Oral, Daily, Malu Moore PA-C, 81 mg at 01/20/24 0837    atorvastatin (LIPITOR) tablet 40 mg, 40 mg, Oral, QPM, Radha  MD Celsa, 40 mg at 01/19/24 1729    bisacodyl (DULCOLAX) rectal suppository 10 mg, 10 mg, Rectal, Daily PRN, Radha Steen MD    calcium acetate (PHOSLO) capsule 667 mg, 667 mg, Oral, TID With Meals, Radha Steen MD, 667 mg at 01/20/24 0842    cinacalcet (SENSIPAR) tablet 60 mg, 60 mg, Oral, Daily, Radha Steen MD, 60 mg at 01/20/24 0837    cloNIDine (CATAPRES) tablet 0.3 mg, 0.3 mg, Oral, Q8H HALIE, Valeria Forrester PA-C, 0.3 mg at 01/20/24 0607    escitalopram (LEXAPRO) tablet 20 mg, 20 mg, Oral, HS, Bryan Claire MD, 20 mg at 01/19/24 2107    famotidine (PEPCID) tablet 40 mg, 40 mg, Oral, Daily, Radha Steen MD, 40 mg at 01/20/24 0837    fentaNYL (SUBLIMAZE) injection 25 mcg, 25 mcg, Intravenous, Q3 min PRN, Elizabeth Martinez CRNA    gabapentin (NEURONTIN) capsule 300 mg, 300 mg, Oral, Daily, Radha Steen MD, 300 mg at 01/19/24 1323    heparin (porcine) subcutaneous injection 5,000 Units, 5,000 Units, Subcutaneous, Q8H HALIE, Bryan Claire MD, 5,000 Units at 01/20/24 0607    hydrALAZINE (APRESOLINE) injection 20 mg, 20 mg, Intravenous, Q4H PRN, Court Edwards MD, 20 mg at 01/20/24 0607    hydrALAZINE (APRESOLINE) tablet 100 mg, 100 mg, Oral, TID, Valeria Forrester PA-C, 100 mg at 01/20/24 0837    insulin lispro (HumaLOG) FOR PUMP REFILLS 300 Units, 300 Units, Subcutaneous Insulin Pump, Daily PRN, Radha Steen MD    labetalol (NORMODYNE) injection 20 mg, 20 mg, Intravenous, Q4H PRN, Valeria Forrester PA-C, 20 mg at 01/17/24 0410    labetalol (NORMODYNE) tablet 800 mg, 800 mg, Oral, TID, Valeria Forrester PA-C, 800 mg at 01/20/24 0836    lisinopril (ZESTRIL) tablet 40 mg, 40 mg, Oral, Daily, Valeria Forrester PA-C, 40 mg at 01/20/24 0836    melatonin tablet 6 mg, 6 mg, Oral, HS PRN, Cinthia Dempsey MD    NIFEdipine (PROCARDIA XL) 24 hr tablet 90 mg, 90 mg, Oral, BID, Valeria Forrester PA-C, 90 mg at 01/20/24 0836    nitroGLYcerin (TRIDIL) 50 mg in 250 mL infusion (premix), 5-200 mcg/min, Intravenous, Titrated, Valeria FISHER  "SAV Forrester, Stopped at 01/19/24 1910    ondansetron (ZOFRAN) injection 4 mg, 4 mg, Intravenous, Q4H PRN, Radha Steen MD, 4 mg at 01/14/24 1351    oxyCODONE (ROXICODONE) split tablet 2.5 mg, 2.5 mg, Oral, Q4H PRN, 2.5 mg at 01/11/24 2001 **OR** oxyCODONE (ROXICODONE) IR tablet 5 mg, 5 mg, Oral, Q4H PRN, Radha Steen MD, 5 mg at 01/18/24 1721    PATIENT MAINTAINED INSULIN PUMP 1 each, 1 each, Subcutaneous, Q8H, Radha Steen MD, 1 each at 01/20/24 0843    polyethylene glycol (MIRALAX) packet 17 g, 17 g, Oral, Daily, Radha Steen MD, 17 g at 01/20/24 0842    prochlorperazine (COMPAZINE) tablet 5 mg, 5 mg, Oral, Q6H PRN, Bryan Claire MD    senna-docusate sodium (SENOKOT S) 8.6-50 mg per tablet 1 tablet, 1 tablet, Oral, BID, Radha Steen MD, 1 tablet at 01/20/24 0837    Invasive Devices:        Lab Results:   Results from last 7 days   Lab Units 01/20/24  0604 01/18/24  0558 01/17/24  0621   WBC Thousand/uL 5.49 7.25 12.49*   HEMOGLOBIN g/dL 8.4* 7.7* 8.1*   HEMATOCRIT % 25.6* 23.9* 24.4*   PLATELETS Thousands/uL 248 242 266   SODIUM mmol/L 139 139 136   POTASSIUM mmol/L 4.7 4.2 4.9   CHLORIDE mmol/L 101 99 98   CO2 mmol/L 29 32 27   BUN mg/dL 27* 22 37*   CREATININE mg/dL 7.42* 7.06* 10.79*   CALCIUM mg/dL 7.9* 7.9* 7.9*   ALK PHOS U/L 72 68 74   ALT U/L 11 10 13   AST U/L 12* 14 14           Portions of the record may have been created with voice recognition software. Occasional wrong word or \"sound a like\" substitutions may have occurred due to the inherent limitations of voice recognition software. Read the chart carefully and recognize, using context, where substitutions have occurred.If you have any questions, please contact the dictating provider.  "

## 2024-01-20 NOTE — ASSESSMENT & PLAN NOTE
Currently on weekly clonidine patch , hydralazine 100mg TID, lisinopril 40mg qd, labetalol 800 mg TID, procardia (increased to 60mg am/90mg pm)  Prn IV labetalol and hydralazine   Further volume removal by HD  Nitro drip stopped last night  Will closely monitor blood pressure  Discussed with nephrology. Appreciate input.

## 2024-01-20 NOTE — PROGRESS NOTES
"Health system  Progress Note  Name: Mateus Mckeon I  MRN: [id number]  Unit/Bed#: [facility] 718-01 I Date of Admission: 1/5/2024   Date of Service: 1/20/2024 I Hospital Day: 15    Assessment/Plan   * Acute CVA (cerebrovascular accident) (HCC)  Assessment & Plan  RUE weakness with mild facial droop 1/12-13  MRI brain \"Crescendo multifocal acute/recent infarctions involving at least 3 discrete vascular territories (left posterior cerebral artery and bilateral middle cerebral artery territories), worrisome for multifocal new/interval embolic infarctions\"  Continue neuro-checks  ASA 81 mg daily and atorvastatin 40mg daily.   Telemetry.  No events  Echo 1/5 no PFO  Cleared for neurology standpoint   Nsx cleared patient for dvt ppx  Started on heparin  Patient will need to Follow up with Neurosurgery in 6 weeks for MRI  PT/OT/PMR    Hypertensive emergency  Assessment & Plan  Currently on weekly clonidine patch , hydralazine 100mg TID, lisinopril 40mg qd, labetalol 800 mg TID, procardia (increased to 60mg am/90mg pm)  Prn IV labetalol and hydralazine   Further volume removal by HD  Nitro drip stopped last night  Will closely monitor blood pressure  Discussed with nephrology. Appreciate input.       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 Mittal, 1/5/2024, 1/12/2024)4.   On ASA for previous strokes reversed with DDAVP.  MRIs negative for source of SAH, small scattered acute/subacute infarcts noted.   CT head wo 1/9/24: Improving subarachnoid hemorrhage compared to recent prior studies. No new findings.     Plan:  ASA 81 mg daily resumed and Nimodipine discontinued per NSX recs 1/12.  No further need for SAH pathway per NSx.  Continue neurochecks  BP control has been very challenging. Goal of SBP<160      End stage kidney " disease ([facility])  Assessment & Plan  Dialysis per nephrology     Anemia in chronic kidney disease, on chronic dialysis ([facility])  Assessment & Plan  Lab Results   Component Value Date    EGFR 8 01/20/2024    EGFR 8 01/18/2024    EGFR 5 01/17/2024    CREATININE 7.42 (H) 01/20/2024    CREATININE 7.06 (H) 01/18/2024    CREATININE 10.79 (H) 01/17/2024   hgb is stable  Continue to monitor   Dialysis MWF    Type 1 diabetes mellitus ([facility])  Assessment & Plan  Lab Results   Component Value Date    HGBA1C 6.0 (H) 01/14/2024       Recent Labs     01/19/24  1316 01/19/24  1922 01/20/24  0029 01/20/24  0617   POCGLU 141* 204* 139 135     Managed with insulin pump.    Blood Sugar Average: Last 72 hrs:  (P) 141.7287202903357943    Blood glucose very well controlled  Continue using pump  Continue to monitor                VTE Pharmacologic Prophylaxis: VTE Score: 7 Moderate Risk (Score 3-4) - Pharmacological DVT Prophylaxis Ordered: heparin.    Mobility:   Basic Mobility Inpatient Raw Score: 18  JH-HLM Goal: 6: Walk 10 steps or more  JH-HLM Achieved: 7: Walk 25 feet or more  HLM Goal NOT achieved. Continue with multidisciplinary rounding and encourage appropriate mobility to improve upon HLM goals.    Patient Centered Rounds: I performed bedside rounds with nursing staff today.   Discussions with Specialists or Other Care Team Provider: Nephrology     Education and Discussions with Family / Patient: Updated  (father and mother) at bedside.    Total Time Spent on Date of Encounter in care of patient: 40 mins. This time was spent on one or more of the following: performing physical exam; counseling and coordination of care; obtaining or reviewing history; documenting in the medical record; reviewing/ordering tests, medications or procedures; communicating with other healthcare professionals and discussing with patient's family/caregivers.    Current Length of Stay: 15 day(s)  Current Patient Status: Inpatient   Certification  Statement: The patient will continue to require additional inpatient hospital stay due to weight challenge with hemodialysis to optimize blood pressure  Discharge Plan: Anticipate discharge in 24-48 hrs to rehab facility.    Code Status: Level 1 - Full Code    Subjective:   Is a very pleasant 38-year-old gentleman seen and evaluated today at bedside.  Patient has no acute complaints at this time.    Objective:     Vitals:   Temp (24hrs), Av.6 °F (37 °C), Min:98.4 °F (36.9 °C), Max:98.7 °F (37.1 °C)    Temp:  [98.4 °F (36.9 °C)-98.7 °F (37.1 °C)] 98.4 °F (36.9 °C)  HR:  [71-79] 71  Resp:  [18] 18  BP: (139-175)/(66-90) 144/77  SpO2:  [96 %-99 %] 97 %  Body mass index is 34.85 kg/m².     Input and Output Summary (last 24 hours):     Intake/Output Summary (Last 24 hours) at 2024 1447  Last data filed at 2024 1729  Gross per 24 hour   Intake 120 ml   Output --   Net 120 ml       Physical Exam:   Physical Exam  Vitals reviewed.   Constitutional:       General: He is not in acute distress.     Appearance: Normal appearance. He is not ill-appearing.   HENT:      Head: Normocephalic and atraumatic.      Right Ear: External ear normal.      Left Ear: External ear normal.      Nose: Nose normal.   Eyes:      Extraocular Movements: Extraocular movements intact.      Conjunctiva/sclera: Conjunctivae normal.   Cardiovascular:      Rate and Rhythm: Normal rate and regular rhythm.      Pulses: Normal pulses.      Heart sounds: Normal heart sounds.   Pulmonary:      Effort: Pulmonary effort is normal.      Breath sounds: Normal breath sounds.   Abdominal:      General: Abdomen is flat.      Palpations: Abdomen is soft.   Musculoskeletal:         General: Normal range of motion.      Cervical back: Normal range of motion.   Skin:     General: Skin is warm and dry.   Neurological:      Mental Status: He is alert.      Comments: Right upper extremity weakness   Psychiatric:         Mood and Affect: Mood normal.          Behavior: Behavior normal.          Additional Data:     Labs:  Results from last 7 days   Lab Units 01/20/24  0604   WBC Thousand/uL 5.49   HEMOGLOBIN g/dL 8.4*   HEMATOCRIT % 25.6*   PLATELETS Thousands/uL 248   NEUTROS PCT % 60   LYMPHS PCT % 24   MONOS PCT % 9   EOS PCT % 5     Results from last 7 days   Lab Units 01/20/24  0604   SODIUM mmol/L 139   POTASSIUM mmol/L 4.7   CHLORIDE mmol/L 101   CO2 mmol/L 29   BUN mg/dL 27*   CREATININE mg/dL 7.42*   ANION GAP mmol/L 9   CALCIUM mg/dL 7.9*   ALBUMIN g/dL 3.4*   TOTAL BILIRUBIN mg/dL 0.46   ALK PHOS U/L 72   ALT U/L 11   AST U/L 12*   GLUCOSE RANDOM mg/dL 127         Results from last 7 days   Lab Units 01/20/24  1307 01/20/24  0617 01/20/24  0029 01/19/24  1922 01/19/24  1316 01/19/24  0528 01/19/24  0004 01/18/24  2104 01/18/24  1657 01/18/24  0600 01/18/24  0009 01/17/24  1809   POC GLUCOSE mg/dl 126 135 139 204* 141* 95 136 128 156* 110 144* 183*     Results from last 7 days   Lab Units 01/14/24  0604   HEMOGLOBIN A1C % 6.0*           Lines/Drains:  Invasive Devices       Peripheral Intravenous Line  Duration             Peripheral IV 01/17/24 Right;Ventral (anterior) Forearm 3 days              Line  Duration             Hemodialysis AV Fistula 11/09/20 Left Other (Comment) 1167 days                          Imaging: No pertinent imaging reviewed.    Recent Cultures (last 7 days):         Last 24 Hours Medication List:   Current Facility-Administered Medications   Medication Dose Route Frequency Provider Last Rate    aspirin  81 mg Oral Daily Malu Moore PA-C      atorvastatin  40 mg Oral QPM Radha Steen MD      bisacodyl  10 mg Rectal Daily PRN Radha Steen MD      calcium acetate  667 mg Oral TID With Meals Radha Steen MD      cinacalcet  60 mg Oral Daily Radha Steen MD      cloNIDine  0.3 mg Oral Q8H HALIE Valeria Forrester PA-C      escitalopram  20 mg Oral HS Bryan Claire MD      famotidine  40 mg Oral Daily Radha Steen MD      fentaNYL  25 mcg  Intravenous Q3 min PRN Elizabeth Martinez CRNA      gabapentin  300 mg Oral Daily Radha Steen MD      heparin (porcine)  5,000 Units Subcutaneous Q8H HALIE Bryan Claire MD      hydrALAZINE  20 mg Intravenous Q4H PRN Court Edwards MD      hydrALAZINE  100 mg Oral TID Valeria Forrester PA-C      insulin lispro  300 Units Subcutaneous Insulin Pump Daily PRN Radha Steen MD      labetalol  20 mg Intravenous Q4H PRN Valeria Forrester PA-C      labetalol  800 mg Oral TID Valeria Forrester PA-C      lisinopril  40 mg Oral Daily Valeria H SAV Forrester      melatonin  6 mg Oral HS PRN Cinthia Dempsey MD      NIFEdipine  90 mg Oral BID Valeria Forrester PA-C      nitroGLYcerin  5-200 mcg/min Intravenous Titrated Valeria Forrester PA-C Stopped (01/19/24 1910)    ondansetron  4 mg Intravenous Q4H PRN Radha Steen MD      oxyCODONE  2.5 mg Oral Q4H PRN Radha Steen MD      Or    oxyCODONE  5 mg Oral Q4H PRN Radha Steen MD      patient maintained insulin pump  1 each Subcutaneous Q8H Radha Steen MD      polyethylene glycol  17 g Oral Daily Radha Steen MD      prochlorperazine  5 mg Oral Q6H PRN Bryan Claire MD      senna-docusate sodium  1 tablet Oral BID Radha Steen MD          Today, Patient Was Seen By: Bryan Claire MD    **Please Note: This note may have been constructed using a voice recognition system.**

## 2024-01-20 NOTE — ASSESSMENT & PLAN NOTE
Lab Results   Component Value Date    EGFR 8 01/20/2024    EGFR 8 01/18/2024    EGFR 5 01/17/2024    CREATININE 7.42 (H) 01/20/2024    CREATININE 7.06 (H) 01/18/2024    CREATININE 10.79 (H) 01/17/2024   hgb is stable  Continue to monitor   Dialysis MWF

## 2024-01-20 NOTE — ASSESSMENT & PLAN NOTE
Lab Results   Component Value Date    HGBA1C 6.0 (H) 01/14/2024       Recent Labs     01/19/24  1316 01/19/24  1922 01/20/24  0029 01/20/24  0617   POCGLU 141* 204* 139 135     Managed with insulin pump.    Blood Sugar Average: Last 72 hrs:  (P) 141.3288371622716344    Blood glucose very well controlled  Continue using pump  Continue to monitor

## 2024-01-20 NOTE — PLAN OF CARE
Problem: PAIN - ADULT  Goal: Verbalizes/displays adequate comfort level or baseline comfort level  Description: Interventions:  - Encourage patient to monitor pain and request assistance  - Assess pain using appropriate pain scale  - Administer analgesics based on type and severity of pain and evaluate response  - Implement non-pharmacological measures as appropriate and evaluate response  - Consider cultural and social influences on pain and pain management  - Notify physician/advanced practitioner if interventions unsuccessful or patient reports new pain  Outcome: Progressing     Problem: INFECTION - ADULT  Goal: Absence or prevention of progression during hospitalization  Description: INTERVENTIONS:  - Assess and monitor for signs and symptoms of infection  - Monitor lab/diagnostic results  - Monitor all insertion sites, i.e. indwelling lines, tubes, and drains  - Monitor endotracheal if appropriate and nasal secretions for changes in amount and color  - [place] appropriate cooling/warming therapies per order  - Administer medications as ordered  - Instruct and encourage patient and family to use good hand hygiene technique  - Identify and instruct in appropriate isolation precautions for identified infection/condition  Outcome: Progressing     Problem: SAFETY ADULT  Goal: Patient will remain free of falls  Description: INTERVENTIONS:  - Educate patient/family on patient safety including physical limitations  - Instruct patient to call for assistance with activity   - Consult OT/PT to assist with strengthening/mobility   - Keep Call bell within reach  - Keep bed low and locked with side rails adjusted as appropriate  - Keep care items and personal belongings within reach  - Initiate and maintain comfort rounds  - Make Fall Risk Sign visible to staff  - Offer Toileting every 2 Hours, in advance of need    Problem: Nutrition/Hydration-ADULT  Goal: Nutrient/Hydration intake appropriate for improving, restoring  or maintaining nutritional needs  Description: Monitor and assess patient's nutrition/hydration status for malnutrition. Collaborate with interdisciplinary team and initiate plan and interventions as ordered.  Monitor patient's weight and dietary intake as ordered or per policy. Utilize nutrition screening tool and intervene as necessary. Determine patient's food preferences and provide high-protein, high-caloric foods as appropriate.     INTERVENTIONS:  - Monitor oral intake, urinary output, labs, and treatment plans  - Assess nutrition and hydration status and recommend course of action  - Evaluate amount of meals eaten  - Assist patient with eating if necessary   - Allow adequate time for meals  - Recommend/ encourage appropriate diets, oral nutritional supplements, and vitamin/mineral supplements  - Order, calculate, and assess calorie counts as needed  - Recommend, monitor, and adjust tube feedings and TPN/PPN based on assessed needs  - Assess need for intravenous fluids  - Provide specific nutrition/hydration education as appropriate  - Include patient/family/caregiver in decisions related to nutrition  Outcome: Progressing     - Apply yellow socks and bracelet for high fall risk patients  - Consider moving patient to room near nurses station  Outcome: Progressing  Goal: Maintain or return to baseline ADL function  Description: INTERVENTIONS:  -  Assess patient's ability to carry out ADLs; assess patient's baseline for ADL function and identify physical deficits which impact ability to perform ADLs (bathing, care of mouth/teeth, toileting, grooming, dressing, etc.)  - Assess/evaluate cause of self-care deficits   - Assess range of motion  - Assess patient's mobility; develop plan if impaired  - Assess patient's need for assistive devices and provide as appropriate  - Encourage maximum independence but intervene and supervise when necessary  - Involve family in performance of ADLs  - Assess for home care needs  following discharge   - Consider OT consult to assist with ADL evaluation and planning for discharge  - Provide patient education as appropriate  Outcome: Progressing  Goal: Maintains/Returns to pre admission functional level  Description: INTERVENTIONS:  - Perform AM-PAC 6 Click Basic Mobility/ Daily Activity assessment daily.  - Set and communicate daily mobility goal to care team and patient/family/caregiver.   - Collaborate with rehabilitation services on mobility goals if consulted  - Perform Range of Motion 2 times a day.  - Reposition patient every 2 hours.  - Dangle patient 2 times a day  - Stand patient 2 times a day  - Ambulate patient 2 times a day  - Out of bed to chair 2 times a day   - Out of bed for meals 2 times a day  - Out of bed for toileting  - Record patient progress and toleration of activity level   Outcome: Progressing     Problem: DISCHARGE PLANNING  Goal: Discharge to home or other facility with appropriate resources  Description: INTERVENTIONS:  - Identify barriers to discharge w/patient and caregiver  - Arrange for needed discharge resources and transportation as appropriate  - Identify discharge learning needs (meds, wound care, etc.)  - Arrange for interpretive services to assist at discharge as needed  - Refer to Case Management Department for coordinating discharge planning if the patient needs post-hospital services based on physician/advanced practitioner order or complex needs related to functional status, cognitive ability, or social support system  Outcome: Progressing     Problem: Knowledge Deficit  Goal: Patient/family/caregiver demonstrates understanding of disease process, treatment plan, medications, and discharge instructions  Description: Complete learning assessment and assess knowledge base.  Interventions:  - Provide teaching at level of understanding  - Provide teaching via preferred learning methods  Outcome: Progressing     Problem: Nutrition/Hydration-ADULT  Goal:  Nutrient/Hydration intake appropriate for improving, restoring or maintaining nutritional needs  Description: Monitor and assess patient's nutrition/hydration status for malnutrition. Collaborate with interdisciplinary team and initiate plan and interventions as ordered.  Monitor patient's weight and dietary intake as ordered or per policy. Utilize nutrition screening tool and intervene as necessary. Determine patient's food preferences and provide high-protein, high-caloric foods as appropriate.     INTERVENTIONS:  - Monitor oral intake, urinary output, labs, and treatment plans  - Assess nutrition and hydration status and recommend course of action  - Evaluate amount of meals eaten  - Assist patient with eating if necessary   - Allow adequate time for meals  - Recommend/ encourage appropriate diets, oral nutritional supplements, and vitamin/mineral supplements  - Order, calculate, and assess calorie counts as needed  - Recommend, monitor, and adjust tube feedings and TPN/PPN based on assessed needs  - Assess need for intravenous fluids  - Provide specific nutrition/hydration education as appropriate  - Include patient/family/caregiver in decisions related to nutrition  Outcome: Progressing     Problem: METABOLIC, FLUID AND ELECTROLYTES - ADULT  Goal: Electrolytes maintained within normal limits  Description: INTERVENTIONS:  - Monitor labs and assess patient for signs and symptoms of electrolyte imbalances  - Administer electrolyte replacement as ordered  - Monitor response to electrolyte replacements, including repeat lab results as appropriate  - Instruct patient on fluid and nutrition as appropriate  Outcome: Progressing  Goal: Fluid balance maintained  Description: INTERVENTIONS:  - Monitor labs   - Monitor I/O and WT  - Instruct patient on fluid and nutrition as appropriate  - Assess for signs & symptoms of volume excess or deficit  Outcome: Progressing     Problem: Prexisting or High Potential for Compromised  Skin Integrity  Goal: Skin integrity is maintained or improved  Description: INTERVENTIONS:  - Identify patients at risk for skin breakdown  - Assess and monitor skin integrity  - Assess and monitor nutrition and hydration status  - Monitor labs   - Assess for incontinence   - Turn and reposition patient  - Assist with mobility/ambulation  - Relieve pressure over bony prominences  - Avoid friction and shearing  - Provide appropriate hygiene as needed including keeping skin clean and dry  - Evaluate need for skin moisturizer/barrier cream  - Collaborate with interdisciplinary team   - Patient/family teaching  - Consider wound care consult   Outcome: Progressing

## 2024-01-21 LAB
ALBUMIN SERPL BCP-MCNC: 3.8 G/DL (ref 3.5–5)
ALP SERPL-CCNC: 88 U/L (ref 34–104)
ALT SERPL W P-5'-P-CCNC: 9 U/L (ref 7–52)
ANION GAP SERPL CALCULATED.3IONS-SCNC: 9 MMOL/L
AST SERPL W P-5'-P-CCNC: 12 U/L (ref 13–39)
BASOPHILS # BLD AUTO: 0.05 THOUSANDS/ÂΜL (ref 0–0.1)
BASOPHILS NFR BLD AUTO: 1 % (ref 0–1)
BILIRUB SERPL-MCNC: 0.49 MG/DL (ref 0.2–1)
BUN SERPL-MCNC: 38 MG/DL (ref 5–25)
CALCIUM SERPL-MCNC: 8.5 MG/DL (ref 8.4–10.2)
CHLORIDE SERPL-SCNC: 100 MMOL/L (ref 96–108)
CO2 SERPL-SCNC: 29 MMOL/L (ref 21–32)
CREAT SERPL-MCNC: 9.64 MG/DL (ref 0.6–1.3)
EOSINOPHIL # BLD AUTO: 0.29 THOUSAND/ÂΜL (ref 0–0.61)
EOSINOPHIL NFR BLD AUTO: 5 % (ref 0–6)
ERYTHROCYTE [DISTWIDTH] IN BLOOD BY AUTOMATED COUNT: 14.4 % (ref 11.6–15.1)
GFR SERPL CREATININE-BSD FRML MDRD: 6 ML/MIN/1.73SQ M
GLUCOSE SERPL-MCNC: 154 MG/DL (ref 65–140)
GLUCOSE SERPL-MCNC: 182 MG/DL (ref 65–140)
GLUCOSE SERPL-MCNC: 183 MG/DL (ref 65–140)
GLUCOSE SERPL-MCNC: 203 MG/DL (ref 65–140)
GLUCOSE SERPL-MCNC: 235 MG/DL (ref 65–140)
HCT VFR BLD AUTO: 26.9 % (ref 36.5–49.3)
HGB BLD-MCNC: 8.6 G/DL (ref 12–17)
IMM GRANULOCYTES # BLD AUTO: 0.02 THOUSAND/UL (ref 0–0.2)
IMM GRANULOCYTES NFR BLD AUTO: 0 % (ref 0–2)
LYMPHOCYTES # BLD AUTO: 1.28 THOUSANDS/ÂΜL (ref 0.6–4.47)
LYMPHOCYTES NFR BLD AUTO: 21 % (ref 14–44)
MCH RBC QN AUTO: 31 PG (ref 26.8–34.3)
MCHC RBC AUTO-ENTMCNC: 32 G/DL (ref 31.4–37.4)
MCV RBC AUTO: 97 FL (ref 82–98)
MONOCYTES # BLD AUTO: 0.42 THOUSAND/ÂΜL (ref 0.17–1.22)
MONOCYTES NFR BLD AUTO: 7 % (ref 4–12)
NEUTROPHILS # BLD AUTO: 4.1 THOUSANDS/ÂΜL (ref 1.85–7.62)
NEUTS SEG NFR BLD AUTO: 66 % (ref 43–75)
NRBC BLD AUTO-RTO: 0 /100 WBCS
PLATELET # BLD AUTO: 272 THOUSANDS/UL (ref 149–390)
PMV BLD AUTO: 10.3 FL (ref 8.9–12.7)
POTASSIUM SERPL-SCNC: 5.5 MMOL/L (ref 3.5–5.3)
PROT SERPL-MCNC: 6.2 G/DL (ref 6.4–8.4)
RBC # BLD AUTO: 2.77 MILLION/UL (ref 3.88–5.62)
SODIUM SERPL-SCNC: 138 MMOL/L (ref 135–147)
WBC # BLD AUTO: 6.16 THOUSAND/UL (ref 4.31–10.16)

## 2024-01-21 PROCEDURE — 82948 REAGENT STRIP/BLOOD GLUCOSE: CPT

## 2024-01-21 PROCEDURE — 80053 COMPREHEN METABOLIC PANEL: CPT | Performed by: FAMILY MEDICINE

## 2024-01-21 PROCEDURE — 99232 SBSQ HOSP IP/OBS MODERATE 35: CPT | Performed by: INTERNAL MEDICINE

## 2024-01-21 PROCEDURE — 99232 SBSQ HOSP IP/OBS MODERATE 35: CPT | Performed by: FAMILY MEDICINE

## 2024-01-21 PROCEDURE — 85025 COMPLETE CBC W/AUTO DIFF WBC: CPT | Performed by: FAMILY MEDICINE

## 2024-01-21 RX ADMIN — CLONIDINE HYDROCHLORIDE 0.3 MG: 0.2 TABLET ORAL at 06:26

## 2024-01-21 RX ADMIN — SENNOSIDES, DOCUSATE SODIUM 1 TABLET: 8.6; 5 TABLET ORAL at 08:34

## 2024-01-21 RX ADMIN — HEPARIN SODIUM 5000 UNITS: 5000 INJECTION INTRAVENOUS; SUBCUTANEOUS at 13:16

## 2024-01-21 RX ADMIN — CLONIDINE HYDROCHLORIDE 0.3 MG: 0.2 TABLET ORAL at 21:52

## 2024-01-21 RX ADMIN — ATORVASTATIN CALCIUM 40 MG: 40 TABLET, FILM COATED ORAL at 17:35

## 2024-01-21 RX ADMIN — HYDRALAZINE HYDROCHLORIDE 100 MG: 50 TABLET ORAL at 21:51

## 2024-01-21 RX ADMIN — SENNOSIDES, DOCUSATE SODIUM 1 TABLET: 8.6; 5 TABLET ORAL at 17:35

## 2024-01-21 RX ADMIN — ESCITALOPRAM OXALATE 20 MG: 20 TABLET ORAL at 21:52

## 2024-01-21 RX ADMIN — LABETALOL HYDROCHLORIDE 800 MG: 200 TABLET, FILM COATED ORAL at 08:34

## 2024-01-21 RX ADMIN — NIFEDIPINE 90 MG: 30 TABLET, FILM COATED, EXTENDED RELEASE ORAL at 21:52

## 2024-01-21 RX ADMIN — FAMOTIDINE 40 MG: 20 TABLET, FILM COATED ORAL at 08:34

## 2024-01-21 RX ADMIN — CALCIUM ACETATE 667 MG: 667 CAPSULE ORAL at 11:51

## 2024-01-21 RX ADMIN — HYDRALAZINE HYDROCHLORIDE 20 MG: 20 INJECTION, SOLUTION INTRAMUSCULAR; INTRAVENOUS at 23:31

## 2024-01-21 RX ADMIN — NIFEDIPINE 90 MG: 30 TABLET, FILM COATED, EXTENDED RELEASE ORAL at 08:37

## 2024-01-21 RX ADMIN — HYDRALAZINE HYDROCHLORIDE 100 MG: 50 TABLET ORAL at 08:34

## 2024-01-21 RX ADMIN — HEPARIN SODIUM 5000 UNITS: 5000 INJECTION INTRAVENOUS; SUBCUTANEOUS at 06:26

## 2024-01-21 RX ADMIN — GABAPENTIN 300 MG: 300 CAPSULE ORAL at 13:16

## 2024-01-21 RX ADMIN — HYDRALAZINE HYDROCHLORIDE 100 MG: 50 TABLET ORAL at 17:35

## 2024-01-21 RX ADMIN — LABETALOL HYDROCHLORIDE 800 MG: 200 TABLET, FILM COATED ORAL at 21:52

## 2024-01-21 RX ADMIN — LABETALOL HYDROCHLORIDE 800 MG: 200 TABLET, FILM COATED ORAL at 17:35

## 2024-01-21 RX ADMIN — CLONIDINE HYDROCHLORIDE 0.3 MG: 0.2 TABLET ORAL at 13:16

## 2024-01-21 RX ADMIN — POLYETHYLENE GLYCOL 3350 17 G: 17 POWDER, FOR SOLUTION ORAL at 08:34

## 2024-01-21 RX ADMIN — CALCIUM ACETATE 667 MG: 667 CAPSULE ORAL at 08:34

## 2024-01-21 RX ADMIN — CALCIUM ACETATE 667 MG: 667 CAPSULE ORAL at 17:35

## 2024-01-21 RX ADMIN — LISINOPRIL 40 MG: 20 TABLET ORAL at 08:34

## 2024-01-21 RX ADMIN — CINACALCET 60 MG: 30 TABLET ORAL at 08:34

## 2024-01-21 RX ADMIN — HEPARIN SODIUM 5000 UNITS: 5000 INJECTION INTRAVENOUS; SUBCUTANEOUS at 21:51

## 2024-01-21 RX ADMIN — ASPIRIN 81 MG CHEWABLE TABLET 81 MG: 81 TABLET CHEWABLE at 08:34

## 2024-01-21 NOTE — PROGRESS NOTES
NEPHROLOGY PROGRESS NOTE   Mateus Mckeon 38 y.o. male MRN: [id number]  [facility] Encounter: [id number]      ASSESSMENT & PLAN:  #1 ESRD on HD MWF at [facility].  For hemodialysis tomorrow, will continue with UF as tolerated    2 acute CVA, neurology on board    #3 uncontrolled HTN, blood pressure is still elevated continue current regimen, continue UF as tolerated during dialysis    #4 anemia of chronic kidney disease, HETAL on hold in the setting of uncontrolled hypertension as well as recent CVA, last hemoglobin 8.6, monitor H&H    5.  Mineral bone disease, continue with renal diet, PhosLo with meals, Sensipar    6 mild hyperkalemia, follow low potassium diet, for HD tomorrow    7 Access, AV fistula in place      SUBJECTIVE:  Patient seen and examined, denies significant complaints, no chest pain, no shortness of breath, no nausea vomiting    OBJECTIVE:  Current Weight: Weight - Scale: 95 kg (209 lb 7 oz)  Vitals:    01/21/24 0725   BP: 163/81   Pulse: 73   Resp: 18   Temp: 98 °F (36.7 °C)   SpO2: 96%       Intake/Output Summary (Last 24 hours) at 1/21/2024 1241  Last data filed at 1/21/2024 0725  Gross per 24 hour   Intake 440 ml   Output --   Net 440 ml       General: conscious, cooperative, in not acute distress  Eyes: conjunctivae pink, anicteric sclerae  ENT: lips and mucous membranes moist  Neck: supple, no JVD  Chest: clear breath sounds bilateral, no crackles, ronchus or wheezings  CVS: distinct S1 & S2, normal rate, regular rhythm  Abdomen: soft, non-tender, non-distended, normoactive bowel sounds  Extremities: no significant edema of both legs  Skin: no rash  Neuro: awake, alert, oriented        Medications:    Current Facility-Administered Medications:     aspirin chewable tablet 81 mg, 81 mg, Oral, Daily, Malu Moore PA-C, 81 mg at 01/21/24 0834    atorvastatin (LIPITOR) tablet 40 mg, 40 mg, Oral, QPM, Radha Steen MD, 40 mg at 01/20/24 1721    bisacodyl (DULCOLAX) rectal  suppository 10 mg, 10 mg, Rectal, Daily PRN, Radha Steen MD    calcium acetate (PHOSLO) capsule 667 mg, 667 mg, Oral, TID With Meals, Radha Steen MD, 667 mg at 01/21/24 1151    cinacalcet (SENSIPAR) tablet 60 mg, 60 mg, Oral, Daily, Radha Steen MD, 60 mg at 01/21/24 0834    cloNIDine (CATAPRES) tablet 0.3 mg, 0.3 mg, Oral, Q8H HALIE, Valeria Forrester PA-C, 0.3 mg at 01/21/24 0626    escitalopram (LEXAPRO) tablet 20 mg, 20 mg, Oral, HS, Bryan Claire MD, 20 mg at 01/20/24 2245    famotidine (PEPCID) tablet 40 mg, 40 mg, Oral, Daily, Radha Steen MD, 40 mg at 01/21/24 0834    fentaNYL (SUBLIMAZE) injection 25 mcg, 25 mcg, Intravenous, Q3 min PRN, Elizabeth Martinez CRNA    gabapentin (NEURONTIN) capsule 300 mg, 300 mg, Oral, Daily, Radha Steen MD, 300 mg at 01/20/24 1322    heparin (porcine) subcutaneous injection 5,000 Units, 5,000 Units, Subcutaneous, Q8H HALIE, Bryan Claire MD, 5,000 Units at 01/21/24 0626    hydrALAZINE (APRESOLINE) injection 20 mg, 20 mg, Intravenous, Q4H PRN, Court Edwards MD, 20 mg at 01/20/24 0607    hydrALAZINE (APRESOLINE) tablet 100 mg, 100 mg, Oral, TID, Valeria Forrester PA-C, 100 mg at 01/21/24 0834    insulin lispro (HumaLOG) FOR PUMP REFILLS 300 Units, 300 Units, Subcutaneous Insulin Pump, Daily PRN, Radha Steen MD    labetalol (NORMODYNE) injection 20 mg, 20 mg, Intravenous, Q4H PRN, Valeria Forrester PA-C, 20 mg at 01/17/24 0410    labetalol (NORMODYNE) tablet 800 mg, 800 mg, Oral, TID, Valeria Forrester PA-C, 800 mg at 01/21/24 0834    lisinopril (ZESTRIL) tablet 40 mg, 40 mg, Oral, Daily, Valeria Forrester PA-C, 40 mg at 01/21/24 0834    melatonin tablet 6 mg, 6 mg, Oral, HS PRN, Cinthia Dempsey MD    NIFEdipine (PROCARDIA XL) 24 hr tablet 90 mg, 90 mg, Oral, BID, Valeria Forrester PA-C, 90 mg at 01/21/24 0837    nitroGLYcerin (TRIDIL) 50 mg in 250 mL infusion (premix), 5-200 mcg/min, Intravenous, Titrated, Valeria Forrester PA-C, Stopped at 01/19/24 1910    ondansetron (ZOFRAN)  "injection 4 mg, 4 mg, Intravenous, Q4H PRN, Radha Steen MD, 4 mg at 01/14/24 1351    oxyCODONE (ROXICODONE) split tablet 2.5 mg, 2.5 mg, Oral, Q4H PRN, 2.5 mg at 01/11/24 2001 **OR** oxyCODONE (ROXICODONE) IR tablet 5 mg, 5 mg, Oral, Q4H PRN, Radha Steen MD, 5 mg at 01/18/24 1721    PATIENT MAINTAINED INSULIN PUMP 1 each, 1 each, Subcutaneous, Q8H, Radha Steen MD, 1 each at 01/21/24 0731    polyethylene glycol (MIRALAX) packet 17 g, 17 g, Oral, Daily, Radha Steen MD, 17 g at 01/21/24 0834    prochlorperazine (COMPAZINE) tablet 5 mg, 5 mg, Oral, Q6H PRN, Bryan Claire MD    senna-docusate sodium (SENOKOT S) 8.6-50 mg per tablet 1 tablet, 1 tablet, Oral, BID, Radha Steen MD, 1 tablet at 01/21/24 0834    Invasive Devices:        Lab Results:   Results from last 7 days   Lab Units 01/21/24  0848 01/20/24  0604 01/18/24  0558   WBC Thousand/uL 6.16 5.49 7.25   HEMOGLOBIN g/dL 8.6* 8.4* 7.7*   HEMATOCRIT % 26.9* 25.6* 23.9*   PLATELETS Thousands/uL 272 248 242   SODIUM mmol/L 138 139 139   POTASSIUM mmol/L 5.5* 4.7 4.2   CHLORIDE mmol/L 100 101 99   CO2 mmol/L 29 29 32   BUN mg/dL 38* 27* 22   CREATININE mg/dL 9.64* 7.42* 7.06*   CALCIUM mg/dL 8.5 7.9* 7.9*   ALK PHOS U/L 88 72 68   ALT U/L 9 11 10   AST U/L 12* 12* 14           Portions of the record may have been created with voice recognition software. Occasional wrong word or \"sound a like\" substitutions may have occurred due to the inherent limitations of voice recognition software. Read the chart carefully and recognize, using context, where substitutions have occurred.If you have any questions, please contact the dictating provider.  "

## 2024-01-21 NOTE — ASSESSMENT & PLAN NOTE
Currently on weekly clonidine patch , hydralazine 100mg TID, lisinopril 40mg qd, labetalol 800 mg TID, procardia (increased to 60mg am/90mg pm)  Prn IV labetalol and hydralazine   Further volume removal by HD  Nitro drip stopped   Will closely monitor blood pressure  Discussed with nephrology. Appreciate input.

## 2024-01-21 NOTE — PROGRESS NOTES
"[facility]  Progress Note  Name: Mateus Mckeon I  MRN: [id number]  Unit/Bed#: [facility] 718-01 I Date of Admission: 1/5/2024   Date of Service: 1/21/2024 I Hospital Day: 16    Assessment/Plan   * Acute CVA (cerebrovascular accident) (HCC)  Assessment & Plan  RUE weakness with mild facial droop 1/12-13  MRI brain \"Crescendo multifocal acute/recent infarctions involving at least 3 discrete vascular territories (left posterior cerebral artery and bilateral middle cerebral artery territories), worrisome for multifocal new/interval embolic infarctions\"  Continue neuro-checks  ASA 81 mg daily and atorvastatin 40mg daily.   Telemetry.  No events  Echo 1/5 no PFO  Cleared for neurology standpoint   Nsx cleared patient for dvt ppx  Started on heparin  Patient will need to Follow up with Neurosurgery in 6 weeks for MRI  PT/OT/PMR    Hypertensive emergency  Assessment & Plan  Currently on weekly clonidine patch , hydralazine 100mg TID, lisinopril 40mg qd, labetalol 800 mg TID, procardia (increased to 60mg am/90mg pm)  Prn IV labetalol and hydralazine   Further volume removal by HD  Nitro drip stopped   Will closely monitor blood pressure  Discussed with nephrology. Appreciate input.       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 Mittal, 1/5/2024, 1/12/2024)4.   On ASA for previous strokes reversed with DDAVP.  MRIs negative for source of SAH, small scattered acute/subacute infarcts noted.   CT head wo 1/9/24: Improving subarachnoid hemorrhage compared to recent prior studies. No new findings.     Plan:  ASA 81 mg daily resumed and Nimodipine discontinued per NSX recs 1/12.  No further need for SAH pathway per NSx.  Continue neurochecks  BP control has been very challenging. Goal of SBP<160      End stage kidney disease " ([facility])  Assessment & Plan  Dialysis per nephrology     Anemia in chronic kidney disease, on chronic dialysis ([facility])  Assessment & Plan  Lab Results   Component Value Date    EGFR 6 01/21/2024    EGFR 8 01/20/2024    EGFR 8 01/18/2024    CREATININE 9.64 (H) 01/21/2024    CREATININE 7.42 (H) 01/20/2024    CREATININE 7.06 (H) 01/18/2024   hgb is stable  Continue to monitor   Dialysis MWF    Type 1 diabetes mellitus ([facility])  Assessment & Plan  Lab Results   Component Value Date    HGBA1C 6.0 (H) 01/14/2024       Recent Labs     01/20/24  1307 01/20/24  1811 01/20/24  2312 01/21/24  0641   POCGLU 126 242* 120 154*     Managed with insulin pump.    Blood Sugar Average: Last 72 hrs:  (P) 145    Blood glucose very well controlled  Continue using pump  Continue to monitor                VTE Pharmacologic Prophylaxis: VTE Score: 7 Moderate Risk (Score 3-4) - Pharmacological DVT Prophylaxis Ordered: heparin.    Mobility:   Basic Mobility Inpatient Raw Score: 20  JH-HLM Goal: 6: Walk 10 steps or more  JH-HLM Achieved: 7: Walk 25 feet or more  HLM Goal NOT achieved. Continue with multidisciplinary rounding and encourage appropriate mobility to improve upon HLM goals.    Patient Centered Rounds: I performed bedside rounds with nursing staff today.   Discussions with Specialists or Other Care Team Provider: Reviewed case with nephrology    Education and Discussions with Family / Patient: Updated  (father) at bedside.    Total Time Spent on Date of Encounter in care of patient: 40 mins. This time was spent on one or more of the following: performing physical exam; counseling and coordination of care; obtaining or reviewing history; documenting in the medical record; reviewing/ordering tests, medications or procedures; communicating with other healthcare professionals and discussing with patient's family/caregivers.    Current Length of Stay: 16 day(s)  Current Patient Status: Inpatient   Certification Statement: The  patient will continue to require additional inpatient hospital stay due to continue dialysis with weight challenge to optimize blood pressure  Discharge Plan: Anticipate discharge in 24-48 hrs to rehab facility.    Code Status: Level 1 - Full Code    Subjective:   Is a very pleasant 38-year-old gentleman was seen evaluated today at bedside.  Patient has no acute complaints this time.    Objective:     Vitals:   Temp (24hrs), Av.5 °F (36.9 °C), Min:98 °F (36.7 °C), Max:99 °F (37.2 °C)    Temp:  [98 °F (36.7 °C)-99 °F (37.2 °C)] 98 °F (36.7 °C)  HR:  [67-73] 73  Resp:  [18] 18  BP: (134-163)/(71-82) 163/81  SpO2:  [96 %-99 %] 96 %  Body mass index is 34.85 kg/m².     Input and Output Summary (last 24 hours):     Intake/Output Summary (Last 24 hours) at 2024 1522  Last data filed at 2024 0725  Gross per 24 hour   Intake 440 ml   Output --   Net 440 ml       Physical Exam:   Physical Exam  Vitals reviewed.   Constitutional:       General: He is not in acute distress.     Appearance: Normal appearance. He is not ill-appearing.   HENT:      Head: Normocephalic and atraumatic.      Right Ear: External ear normal.      Left Ear: External ear normal.      Nose: Nose normal.   Eyes:      Extraocular Movements: Extraocular movements intact.      Conjunctiva/sclera: Conjunctivae normal.   Cardiovascular:      Rate and Rhythm: Normal rate and regular rhythm.      Pulses: Normal pulses.      Heart sounds: Normal heart sounds.   Pulmonary:      Effort: Pulmonary effort is normal.      Breath sounds: Normal breath sounds.   Abdominal:      General: Abdomen is flat.      Palpations: Abdomen is soft.   Musculoskeletal:         General: Normal range of motion.      Cervical back: Normal range of motion.   Skin:     General: Skin is warm and dry.   Neurological:      Mental Status: He is alert.      Comments: Right upper extremity weakness   Psychiatric:         Mood and Affect: Mood normal.         Behavior: Behavior  normal.          Additional Data:     Labs:  Results from last 7 days   Lab Units 01/21/24  0848   WBC Thousand/uL 6.16   HEMOGLOBIN g/dL 8.6*   HEMATOCRIT % 26.9*   PLATELETS Thousands/uL 272   NEUTROS PCT % 66   LYMPHS PCT % 21   MONOS PCT % 7   EOS PCT % 5     Results from last 7 days   Lab Units 01/21/24  0848   SODIUM mmol/L 138   POTASSIUM mmol/L 5.5*   CHLORIDE mmol/L 100   CO2 mmol/L 29   BUN mg/dL 38*   CREATININE mg/dL 9.64*   ANION GAP mmol/L 9   CALCIUM mg/dL 8.5   ALBUMIN g/dL 3.8   TOTAL BILIRUBIN mg/dL 0.49   ALK PHOS U/L 88   ALT U/L 9   AST U/L 12*   GLUCOSE RANDOM mg/dL 203*         Results from last 7 days   Lab Units 01/21/24  1109 01/21/24  0641 01/20/24  2312 01/20/24  1811 01/20/24  1307 01/20/24  0617 01/20/24  0029 01/19/24  1922 01/19/24  1316 01/19/24  0528 01/19/24  0004 01/18/24  2104   POC GLUCOSE mg/dl 235* 154* 120 242* 126 135 139 204* 141* 95 136 128               Lines/Drains:  Invasive Devices       Peripheral Intravenous Line  Duration             Peripheral IV 01/17/24 Right;Ventral (anterior) Forearm 4 days              Line  Duration             Hemodialysis AV Fistula 11/09/20 Left Other (Comment) 1168 days                          Imaging: No pertinent imaging reviewed.    Recent Cultures (last 7 days):         Last 24 Hours Medication List:   Current Facility-Administered Medications   Medication Dose Route Frequency Provider Last Rate    aspirin  81 mg Oral Daily Malu Moore PA-C      atorvastatin  40 mg Oral QPM Radha Steen MD      bisacodyl  10 mg Rectal Daily PRN Radha Steen MD      calcium acetate  667 mg Oral TID With Meals Radha Steen MD      cinacalcet  60 mg Oral Daily Radha Steen MD      cloNIDine  0.3 mg Oral Q8H HALIE Valeria Forrester PA-C      escitalopram  20 mg Oral HS Bryan Claire MD      famotidine  40 mg Oral Daily Radha Steen MD      fentaNYL  25 mcg Intravenous Q3 min PRN Elizabeth Martinez CRNA      gabapentin  300 mg Oral Daily Radha Steen MD       heparin (porcine)  5,000 Units Subcutaneous Q8H HALIE Bryan Claire MD      hydrALAZINE  20 mg Intravenous Q4H PRN Court Edwards MD      hydrALAZINE  100 mg Oral TID Valeria Forrester PA-C      insulin lispro  300 Units Subcutaneous Insulin Pump Daily PRN Radha Steen MD      labetalol  20 mg Intravenous Q4H PRN Valeria Forrester PA-C      labetalol  800 mg Oral TID Valeria Forrester PA-C      lisinopril  40 mg Oral Daily Valeria Forrester PA-C      melatonin  6 mg Oral HS PRN Cinthia Dempsey MD      NIFEdipine  90 mg Oral BID Valeria Forrester PA-C      nitroGLYcerin  5-200 mcg/min Intravenous Titrated Valeria Forrester PA-C Stopped (01/19/24 1910)    ondansetron  4 mg Intravenous Q4H PRN Radha Steen MD      oxyCODONE  2.5 mg Oral Q4H PRN Radha Steen MD      Or    oxyCODONE  5 mg Oral Q4H PRN Radha Steen MD      patient maintained insulin pump  1 each Subcutaneous Q8H Radha Steen MD      polyethylene glycol  17 g Oral Daily Radha Steen MD      prochlorperazine  5 mg Oral Q6H PRN Bryan Claire MD      senna-docusate sodium  1 tablet Oral BID Radha Steen MD          Today, Patient Was Seen By: Bryan Claire MD    **Please Note: This note may have been constructed using a voice recognition system.**

## 2024-01-21 NOTE — PLAN OF CARE
Problem: PAIN - ADULT  Goal: Verbalizes/displays adequate comfort level or baseline comfort level  Description: Interventions:  - Encourage patient to monitor pain and request assistance  - Assess pain using appropriate pain scale  - Administer analgesics based on type and severity of pain and evaluate response  - Implement non-pharmacological measures as appropriate and evaluate response  - Consider cultural and social influences on pain and pain management  - Notify physician/advanced practitioner if interventions unsuccessful or patient reports new pain  Outcome: Progressing     Problem: INFECTION - ADULT  Goal: Absence or prevention of progression during hospitalization  Description: INTERVENTIONS:  - Assess and monitor for signs and symptoms of infection  - Monitor lab/diagnostic results  - Monitor all insertion sites, i.e. indwelling lines, tubes, and drains  - Monitor endotracheal if appropriate and nasal secretions for changes in amount and color  - [place] appropriate cooling/warming therapies per order  - Administer medications as ordered  - Instruct and encourage patient and family to use good hand hygiene technique  - Identify and instruct in appropriate isolation precautions for identified infection/condition  Outcome: Progressing     Problem: SAFETY ADULT  Goal: Patient will remain free of falls  Description: INTERVENTIONS:  - Educate patient/family on patient safety including physical limitations  - Instruct patient to call for assistance with activity   - Consult OT/PT to assist with strengthening/mobility   - Keep Call bell within reach  - Keep bed low and locked with side rails adjusted as appropriate  - Keep care items and personal belongings within reach  - Initiate and maintain comfort rounds  - Make Fall Risk Sign visible to staff  - Apply yellow socks and bracelet for high fall risk patients  - Consider moving patient to room near nurses station  Outcome: Progressing  Goal: Maintain or  return to baseline ADL function  Description: INTERVENTIONS:  -  Assess patient's ability to carry out ADLs; assess patient's baseline for ADL function and identify physical deficits which impact ability to perform ADLs (bathing, care of mouth/teeth, toileting, grooming, dressing, etc.)  - Assess/evaluate cause of self-care deficits   - Assess range of motion  - Assess patient's mobility; develop plan if impaired  - Assess patient's need for assistive devices and provide as appropriate  - Encourage maximum independence but intervene and supervise when necessary  - Involve family in performance of ADLs  - Assess for home care needs following discharge   - Consider OT consult to assist with ADL evaluation and planning for discharge  - Provide patient education as appropriate  Outcome: Progressing  Goal: Maintains/Returns to pre admission functional level  Description: INTERVENTIONS:  - Perform AM-PAC 6 Click Basic Mobility/ Daily Activity assessment daily.  - Set and communicate daily mobility goal to care team and patient/family/caregiver.   - Collaborate with rehabilitation services on mobility goals if consulted  - Out of bed for toileting  - Record patient progress and toleration of activity level   Outcome: Progressing     Problem: DISCHARGE PLANNING  Goal: Discharge to home or other facility with appropriate resources  Description: INTERVENTIONS:  - Identify barriers to discharge w/patient and caregiver  - Arrange for needed discharge resources and transportation as appropriate  - Identify discharge learning needs (meds, wound care, etc.)  - Arrange for interpretive services to assist at discharge as needed  - Refer to Case Management Department for coordinating discharge planning if the patient needs post-hospital services based on physician/advanced practitioner order or complex needs related to functional status, cognitive ability, or social support system  Outcome: Progressing     Problem: Knowledge  Deficit  Goal: Patient/family/caregiver demonstrates understanding of disease process, treatment plan, medications, and discharge instructions  Description: Complete learning assessment and assess knowledge base.  Interventions:  - Provide teaching at level of understanding  - Provide teaching via preferred learning methods  Outcome: Progressing     Problem: Nutrition/Hydration-ADULT  Goal: Nutrient/Hydration intake appropriate for improving, restoring or maintaining nutritional needs  Description: Monitor and assess patient's nutrition/hydration status for malnutrition. Collaborate with interdisciplinary team and initiate plan and interventions as ordered.  Monitor patient's weight and dietary intake as ordered or per policy. Utilize nutrition screening tool and intervene as necessary. Determine patient's food preferences and provide high-protein, high-caloric foods as appropriate.     INTERVENTIONS:  - Monitor oral intake, urinary output, labs, and treatment plans  - Assess nutrition and hydration status and recommend course of action  - Evaluate amount of meals eaten  - Assist patient with eating if necessary   - Allow adequate time for meals  - Recommend/ encourage appropriate diets, oral nutritional supplements, and vitamin/mineral supplements  - Order, calculate, and assess calorie counts as needed  - Recommend, monitor, and adjust tube feedings and TPN/PPN based on assessed needs  - Assess need for intravenous fluids  - Provide specific nutrition/hydration education as appropriate  - Include patient/family/caregiver in decisions related to nutrition  Outcome: Progressing     Problem: METABOLIC, FLUID AND ELECTROLYTES - ADULT  Goal: Electrolytes maintained within normal limits  Description: INTERVENTIONS:  - Monitor labs and assess patient for signs and symptoms of electrolyte imbalances  - Administer electrolyte replacement as ordered  - Monitor response to electrolyte replacements, including repeat lab  results as appropriate  - Instruct patient on fluid and nutrition as appropriate  Outcome: Progressing  Goal: Fluid balance maintained  Description: INTERVENTIONS:  - Monitor labs   - Monitor I/O and WT  - Instruct patient on fluid and nutrition as appropriate  - Assess for signs & symptoms of volume excess or deficit  Outcome: Progressing     Problem: Prexisting or High Potential for Compromised Skin Integrity  Goal: Skin integrity is maintained or improved  Description: INTERVENTIONS:  - Identify patients at risk for skin breakdown  - Assess and monitor skin integrity  - Assess and monitor nutrition and hydration status  - Monitor labs   - Assess for incontinence   - Turn and reposition patient  - Assist with mobility/ambulation  - Relieve pressure over bony prominences  - Avoid friction and shearing  - Provide appropriate hygiene as needed including keeping skin clean and dry  - Evaluate need for skin moisturizer/barrier cream  - Collaborate with interdisciplinary team   - Patient/family teaching  - Consider wound care consult   Outcome: Progressing

## 2024-01-21 NOTE — ASSESSMENT & PLAN NOTE
Lab Results   Component Value Date    HGBA1C 6.0 (H) 01/14/2024       Recent Labs     01/20/24  1307 01/20/24  1811 01/20/24  2312 01/21/24  0641   POCGLU 126 242* 120 154*     Managed with insulin pump.    Blood Sugar Average: Last 72 hrs:  (P) 145    Blood glucose very well controlled  Continue using pump  Continue to monitor

## 2024-01-21 NOTE — ASSESSMENT & PLAN NOTE
Lab Results   Component Value Date    EGFR 6 01/21/2024    EGFR 8 01/20/2024    EGFR 8 01/18/2024    CREATININE 9.64 (H) 01/21/2024    CREATININE 7.42 (H) 01/20/2024    CREATININE 7.06 (H) 01/18/2024   hgb is stable  Continue to monitor   Dialysis MWF

## 2024-01-22 ENCOUNTER — APPOINTMENT (INPATIENT)
Dept: DIALYSIS | Facility: HOSPITAL | Age: 39
DRG: 064 | End: 2024-01-22
Attending: STUDENT IN AN ORGANIZED HEALTH CARE EDUCATION/TRAINING PROGRAM
Payer: MEDICARE

## 2024-01-22 LAB
ANION GAP SERPL CALCULATED.3IONS-SCNC: 11 MMOL/L
BUN SERPL-MCNC: 49 MG/DL (ref 5–25)
CALCIUM SERPL-MCNC: 8.1 MG/DL (ref 8.4–10.2)
CHLORIDE SERPL-SCNC: 99 MMOL/L (ref 96–108)
CO2 SERPL-SCNC: 26 MMOL/L (ref 21–32)
CREAT SERPL-MCNC: 10.84 MG/DL (ref 0.6–1.3)
GFR SERPL CREATININE-BSD FRML MDRD: 5 ML/MIN/1.73SQ M
GLUCOSE SERPL-MCNC: 131 MG/DL (ref 65–140)
GLUCOSE SERPL-MCNC: 152 MG/DL (ref 65–140)
GLUCOSE SERPL-MCNC: 170 MG/DL (ref 65–140)
POTASSIUM SERPL-SCNC: 6.3 MMOL/L (ref 3.5–5.3)
SODIUM SERPL-SCNC: 136 MMOL/L (ref 135–147)
VIT B6 SERPL-MCNC: NORMAL UG/L

## 2024-01-22 PROCEDURE — 82948 REAGENT STRIP/BLOOD GLUCOSE: CPT

## 2024-01-22 PROCEDURE — 90935 HEMODIALYSIS ONE EVALUATION: CPT | Performed by: INTERNAL MEDICINE

## 2024-01-22 PROCEDURE — 99232 SBSQ HOSP IP/OBS MODERATE 35: CPT | Performed by: NEUROLOGICAL SURGERY

## 2024-01-22 PROCEDURE — 80048 BASIC METABOLIC PNL TOTAL CA: CPT | Performed by: FAMILY MEDICINE

## 2024-01-22 PROCEDURE — 99232 SBSQ HOSP IP/OBS MODERATE 35: CPT | Performed by: FAMILY MEDICINE

## 2024-01-22 RX ADMIN — CINACALCET 60 MG: 30 TABLET ORAL at 07:21

## 2024-01-22 RX ADMIN — LABETALOL HYDROCHLORIDE 800 MG: 200 TABLET, FILM COATED ORAL at 16:41

## 2024-01-22 RX ADMIN — CLONIDINE HYDROCHLORIDE 0.3 MG: 0.2 TABLET ORAL at 13:06

## 2024-01-22 RX ADMIN — CLONIDINE HYDROCHLORIDE 0.3 MG: 0.2 TABLET ORAL at 20:51

## 2024-01-22 RX ADMIN — FAMOTIDINE 40 MG: 20 TABLET, FILM COATED ORAL at 13:06

## 2024-01-22 RX ADMIN — HEPARIN SODIUM 5000 UNITS: 5000 INJECTION INTRAVENOUS; SUBCUTANEOUS at 13:08

## 2024-01-22 RX ADMIN — LISINOPRIL 40 MG: 20 TABLET ORAL at 13:15

## 2024-01-22 RX ADMIN — POLYETHYLENE GLYCOL 3350 17 G: 17 POWDER, FOR SOLUTION ORAL at 13:16

## 2024-01-22 RX ADMIN — ATORVASTATIN CALCIUM 40 MG: 40 TABLET, FILM COATED ORAL at 16:41

## 2024-01-22 RX ADMIN — ASPIRIN 81 MG CHEWABLE TABLET 81 MG: 81 TABLET CHEWABLE at 13:15

## 2024-01-22 RX ADMIN — FAMOTIDINE 40 MG: 20 TABLET, FILM COATED ORAL at 07:23

## 2024-01-22 RX ADMIN — NIFEDIPINE 90 MG: 30 TABLET, FILM COATED, EXTENDED RELEASE ORAL at 20:51

## 2024-01-22 RX ADMIN — ESCITALOPRAM OXALATE 20 MG: 20 TABLET ORAL at 20:51

## 2024-01-22 RX ADMIN — CALCIUM ACETATE 667 MG: 667 CAPSULE ORAL at 13:06

## 2024-01-22 RX ADMIN — CLONIDINE HYDROCHLORIDE 0.3 MG: 0.2 TABLET ORAL at 07:19

## 2024-01-22 RX ADMIN — NIFEDIPINE 90 MG: 30 TABLET, FILM COATED, EXTENDED RELEASE ORAL at 07:20

## 2024-01-22 RX ADMIN — GABAPENTIN 300 MG: 300 CAPSULE ORAL at 13:06

## 2024-01-22 RX ADMIN — CALCIUM ACETATE 667 MG: 667 CAPSULE ORAL at 07:21

## 2024-01-22 RX ADMIN — LABETALOL HYDROCHLORIDE 800 MG: 200 TABLET, FILM COATED ORAL at 07:20

## 2024-01-22 RX ADMIN — CINACALCET 60 MG: 30 TABLET ORAL at 13:15

## 2024-01-22 RX ADMIN — HYDRALAZINE HYDROCHLORIDE 100 MG: 50 TABLET ORAL at 07:20

## 2024-01-22 RX ADMIN — HEPARIN SODIUM 5000 UNITS: 5000 INJECTION INTRAVENOUS; SUBCUTANEOUS at 20:52

## 2024-01-22 RX ADMIN — SENNOSIDES, DOCUSATE SODIUM 1 TABLET: 8.6; 5 TABLET ORAL at 13:14

## 2024-01-22 RX ADMIN — HYDRALAZINE HYDROCHLORIDE 100 MG: 50 TABLET ORAL at 16:40

## 2024-01-22 RX ADMIN — HYDRALAZINE HYDROCHLORIDE 100 MG: 50 TABLET ORAL at 20:51

## 2024-01-22 RX ADMIN — HEPARIN SODIUM 5000 UNITS: 5000 INJECTION INTRAVENOUS; SUBCUTANEOUS at 07:18

## 2024-01-22 RX ADMIN — ASPIRIN 81 MG CHEWABLE TABLET 81 MG: 81 TABLET CHEWABLE at 07:22

## 2024-01-22 RX ADMIN — CALCIUM ACETATE 667 MG: 667 CAPSULE ORAL at 16:41

## 2024-01-22 RX ADMIN — LABETALOL HYDROCHLORIDE 800 MG: 200 TABLET, FILM COATED ORAL at 20:51

## 2024-01-22 NOTE — PROCEDURES
NEPHROLOGY DIALYSIS PROCEDURE NOTE      Patient seen and examined on hemodialysis, tolerating procedure well. All documentation, labs, medications were reviewed by myself, and the treatment plan was reviewed with nurse and patient.     Seen on Dialysis at : 10:45 AM  Dialysis Access: Left Arm AV Fistula  Vitals: 152/96, preweight 97 kg  Dialysis time: 3.5 hours  Dialyzer: F180  Sodium bath: 142  Potassium bath: 1 K+  Bicarbonate bath: 35  Calcium bath: 2.5  Ultrafiltration: 4 L  Blood flow rate: 400 cc/min  Dialysis flow rate: 1.5 X Qb  Dialysis temperature: 37C  Medications given on HD: none      Summary:    39 yo man with PMH of ESRD on HD at [facility], [facility] p/w headaches.  Nephrology is consulted for management of ESRD     Assessment/Plan:    End Stage Renal Disease  -Modality:In-Center Hemodialysis  -Schedule: Monday/Wednesday/Friday  -Dialysis Unit: [facility]  -Access: Left Arm AV Fistula  -Presciption: Reviewed  -Status: Blood pressure has improved since admission, K noted to be elevated this morning, remains volume overloaded  -Plan:   Continue dialysis utilizing 3-1/2 hours  Plan for next dialysis on Wednesday  Continue current management    Anemia  -Avoid HETAL at this time due to history of recent CVA along with uncontrolled hypertension    [place] bone disorder-chronic kidney disease  -Continue calcium acetate with meals along with Sensipar    Blood pressure/volume status  -High blood pressure and volume overloaded  -Of resistant hypertension  -CVA  -Blood pressure has improved since admission  -Continued ultrafiltration which will help with blood pressure  -Aim for gentle reduction of the blood pressure  -Doing 0.3 mg every 8 hours, hydralazine 100 mg 3 times daily, lisinopril 40 mg daily, labetalol 100 mg 3 times daily nifedipine 90 mg twice a day  -Volume overloaded continue ultrafiltration    Hyperkalemia  -Currently on a 1K dialysis bath  -Low potassium diet    CVA  -MRI shows a close Endo multifocal  acute and recent infarctions  -Management as per neurology  -Continue blood pressure control and aim for gentle reduction  -Continue aspirin and Lipitor  -Subarachnoid hemorrhage, status post cerebral angiography x 2 earlier this month    Review of Systems: The entire 12 point ROS has been reviewed.    Physical Exam:    General: Awake, no acute distress, frustrated  Skin: No rash no lesions  CVS: S1-S2 appreciated regular rate and rhythm  Lungs: Clear  Abdomen: Nontender nondistended  Access: Audible bruit and palpable thrill  Extremities: Positive edema  Neuro: No asterixis          Current Facility-Administered Medications:     aspirin chewable tablet 81 mg, 81 mg, Oral, Daily, Malu Moore PA-C, 81 mg at 01/22/24 0722    atorvastatin (LIPITOR) tablet 40 mg, 40 mg, Oral, QPM, Radha Steen MD, 40 mg at 01/21/24 1735    bisacodyl (DULCOLAX) rectal suppository 10 mg, 10 mg, Rectal, Daily PRN, Radha Steen MD    calcium acetate (PHOSLO) capsule 667 mg, 667 mg, Oral, TID With Meals, Radha Steen MD, 667 mg at 01/22/24 0721    cinacalcet (SENSIPAR) tablet 60 mg, 60 mg, Oral, Daily, Radha Steen MD, 60 mg at 01/22/24 0721    cloNIDine (CATAPRES) tablet 0.3 mg, 0.3 mg, Oral, Q8H [facility], Valeria Forrester PA-C, 0.3 mg at 01/22/24 0719    escitalopram (LEXAPRO) tablet 20 mg, 20 mg, Oral, HS, Bryan Claire MD, 20 mg at 01/21/24 2152    famotidine (PEPCID) tablet 40 mg, 40 mg, Oral, Daily, Radha Steen MD, 40 mg at 01/22/24 0723    fentaNYL (SUBLIMAZE) injection 25 mcg, 25 mcg, Intravenous, Q3 min PRN, Elizabeth Martinez CRNA    gabapentin (NEURONTIN) capsule 300 mg, 300 mg, Oral, Daily, Radha Steen MD, 300 mg at 01/21/24 1316    heparin (porcine) subcutaneous injection 5,000 Units, 5,000 Units, Subcutaneous, Q8H [facility], Bryan Claire MD, 5,000 Units at 01/22/24 0718    hydrALAZINE (APRESOLINE) injection 20 mg, 20 mg, Intravenous, Q4H PRN, Court Edwards MD, 20 mg at 01/21/24 7101    hydrALAZINE (APRESOLINE) tablet 100 mg, 100 mg,  Oral, TID, Valeria Forrester PA-C, 100 mg at 01/22/24 0720    insulin lispro (HumaLOG) FOR PUMP REFILLS 300 Units, 300 Units, Subcutaneous Insulin Pump, Daily PRN, Radha Steen MD    labetalol (NORMODYNE) injection 20 mg, 20 mg, Intravenous, Q4H PRN, Valeria Forrester PA-C, 20 mg at 01/17/24 0410    labetalol (NORMODYNE) tablet 800 mg, 800 mg, Oral, TID, Valeria Forrester PA-C, 800 mg at 01/22/24 0720    lisinopril (ZESTRIL) tablet 40 mg, 40 mg, Oral, Daily, Valeria Forrester PA-C, 40 mg at 01/21/24 0834    melatonin tablet 6 mg, 6 mg, Oral, HS PRN, Cinthia Dempsey MD    NIFEdipine (PROCARDIA XL) 24 hr tablet 90 mg, 90 mg, Oral, BID, Valeria Forrester PA-C, 90 mg at 01/22/24 0720    nitroGLYcerin (TRIDIL) 50 mg in 250 mL infusion (premix), 5-200 mcg/min, Intravenous, Titrated, Valeria Forrester PA-C, Stopped at 01/19/24 1910    ondansetron (ZOFRAN) injection 4 mg, 4 mg, Intravenous, Q4H PRN, Radha Steen MD, 4 mg at 01/14/24 1351    oxyCODONE (ROXICODONE) split tablet 2.5 mg, 2.5 mg, Oral, Q4H PRN, 2.5 mg at 01/11/24 2001 **OR** oxyCODONE (ROXICODONE) IR tablet 5 mg, 5 mg, Oral, Q4H PRN, Radha Steen MD, 5 mg at 01/18/24 1721    PATIENT MAINTAINED INSULIN PUMP 1 each, 1 each, Subcutaneous, Q8H, Radha Steen MD, 1 each at 01/22/24 0722    polyethylene glycol (MIRALAX) packet 17 g, 17 g, Oral, Daily, Radha Steen MD, 17 g at 01/21/24 0834    prochlorperazine (COMPAZINE) tablet 5 mg, 5 mg, Oral, Q6H PRN, Bryan Claire MD    senna-docusate sodium (SENOKOT S) 8.6-50 mg per tablet 1 tablet, 1 tablet, Oral, BID, Radha Steen MD, 1 tablet at 01/21/24 1124

## 2024-01-22 NOTE — PROGRESS NOTES
VTE Pharmacologic Prophylaxis: VTE Score: 7 Moderate Risk (Score 3-4) - Pharmacological DVT Prophylaxis Ordered: heparin.    Mobility:   Basic Mobility Inpatient Raw Score: 20  JH-HLM Goal: 6: Walk 10 steps or more  JH-HLM Achieved: 6: Walk 10 steps or more  HLM Goal achieved. Continue to encourage appropriate mobility.    Patient Centered Rounds: I performed bedside rounds with nursing staff today.   Discussions with Specialists or Other Care Team Provider: None    Education and Discussions with Family / Patient:  Both mother and father updated with plan.     Total Time Spent on Date of Encounter in care of patient: 40 mins. This time was spent on one or more of the following: performing physical exam; counseling and coordination of care; obtaining or reviewing history; documenting in the medical record; reviewing/ordering tests, medications or procedures; communicating with other healthcare professionals and discussing with patient's family/caregivers.    Current Length of Stay: 17 day(s)  Current Patient Status: Inpatient   Certification Statement: The patient will continue to require additional inpatient hospital stay due to Pending placement  Discharge Plan: Anticipate discharge in 24-48 hrs to rehab facility.    Code Status: Level 1 - Full Code    Subjective:   Is a very pleasant 38-year-old gentleman was seen and evaluated today at bedside.  Patient just finished his dialysis and is very tired.    Objective:     Vitals:   Temp (24hrs), Av.2 °F (36.8 °C), Min:97.6 °F (36.4 °C), Max:98.8 °F (37.1 °C)    Temp:  [97.6 °F (36.4 °C)-98.8 °F (37.1 °C)] 98.8 °F (37.1 °C)  HR:  [68-80] 80  Resp:  [16-18] 16  BP: (133-179)/() 152/66  SpO2:  [96 %-99 %] 96 %  Body mass index is 34.78 kg/m².     Input and Output Summary (last 24 hours):     Intake/Output Summary (Last 24 hours) at 2024 1618  Last data filed at 2024 1207  Gross per 24 hour   Intake 720 ml   Output 5452 ml   Net -4732 ml        Physical Exam:   Physical Exam  Vitals reviewed.   Constitutional:       General: He is not in acute distress.     Appearance: Normal appearance. He is not ill-appearing.   HENT:      Head: Normocephalic and atraumatic.      Right Ear: External ear normal.      Left Ear: External ear normal.      Nose: Nose normal.   Eyes:      Extraocular Movements: Extraocular movements intact.      Conjunctiva/sclera: Conjunctivae normal.   Cardiovascular:      Rate and Rhythm: Normal rate and regular rhythm.      Pulses: Normal pulses.      Heart sounds: Normal heart sounds.   Pulmonary:      Effort: Pulmonary effort is normal.      Breath sounds: Normal breath sounds.   Abdominal:      General: Abdomen is flat.      Palpations: Abdomen is soft.   Musculoskeletal:         General: Normal range of motion.      Cervical back: Normal range of motion.   Skin:     General: Skin is warm and dry.   Neurological:      Mental Status: He is alert.      Comments: Right upper extremity weakness   Psychiatric:         Mood and Affect: Mood normal.         Behavior: Behavior normal.          Additional Data:     Labs:  Results from last 7 days   Lab Units 01/21/24  0848   WBC Thousand/uL 6.16   HEMOGLOBIN g/dL 8.6*   HEMATOCRIT % 26.9*   PLATELETS Thousands/uL 272   NEUTROS PCT % 66   LYMPHS PCT % 21   MONOS PCT % 7   EOS PCT % 5     Results from last 7 days   Lab Units 01/22/24  0835 01/21/24  0848   SODIUM mmol/L 136 138   POTASSIUM mmol/L 6.3* 5.5*   CHLORIDE mmol/L 99 100   CO2 mmol/L 26 29   BUN mg/dL 49* 38*   CREATININE mg/dL 10.84* 9.64*   ANION GAP mmol/L 11 9   CALCIUM mg/dL 8.1* 8.5   ALBUMIN g/dL  --  3.8   TOTAL BILIRUBIN mg/dL  --  0.49   ALK PHOS U/L  --  88   ALT U/L  --  9   AST U/L  --  12*   GLUCOSE RANDOM mg/dL 131 203*         Results from last 7 days   Lab Units 01/22/24  0551 01/21/24  2300 01/21/24  1816 01/21/24  1109 01/21/24  0641 01/20/24  2312 01/20/24  1811 01/20/24  1307 01/20/24  0617 01/20/24  0029  01/19/24  1922 01/19/24  1316   POC GLUCOSE mg/dl 152* 182* 183* 235* 154* 120 242* 126 135 139 204* 141*               Lines/Drains:  Invasive Devices       Peripheral Intravenous Line  Duration             Peripheral IV 01/21/24 Right Antecubital <1 day              Line  Duration             Hemodialysis AV Fistula 11/09/20 Left Other (Comment) 1169 days              Drain  Duration             External Urinary Catheter <1 day                          Imaging: No pertinent imaging reviewed.    Recent Cultures (last 7 days):         Last 24 Hours Medication List:   Current Facility-Administered Medications   Medication Dose Route Frequency Provider Last Rate    aspirin  81 mg Oral Daily Malu Moore PA-C      atorvastatin  40 mg Oral QPM Radha Steen MD      bisacodyl  10 mg Rectal Daily PRN Radha Steen MD      calcium acetate  667 mg Oral TID With Meals Radha Steen MD      cinacalcet  60 mg Oral Daily Radha Steen MD      cloNIDine  0.3 mg Oral Q8H [facility] Valeria Forrester PA-C      escitalopram  20 mg Oral HS Bryan Claire MD      famotidine  40 mg Oral Daily Radha Steen MD      fentaNYL  25 mcg Intravenous Q3 min PRN Elizabeth Martinez CRNA      gabapentin  300 mg Oral Daily Radha Steen MD      heparin (porcine)  5,000 Units Subcutaneous Q8H [facility] Bryan Claire MD      hydrALAZINE  20 mg Intravenous Q4H PRN Court Edwards MD      hydrALAZINE  100 mg Oral TID Valeria Forrester PA-C      insulin lispro  300 Units Subcutaneous Insulin Pump Daily PRN Radha Steen MD      labetalol  20 mg Intravenous Q4H PRN Valeria Forrester PA-C      labetalol  800 mg Oral TID Valeria Forrester PA-C      lisinopril  40 mg Oral Daily Valeria Forrester PA-C      melatonin  6 mg Oral HS PRN Cinthia Dempsey MD      NIFEdipine  90 mg Oral BID Valeria Forrester PA-C      nitroGLYcerin  5-200 mcg/min Intravenous Titrated Valeria Forrester PA-C Stopped (01/19/24 1910)    ondansetron  4 mg Intravenous Q4H PRN Radha Steen MD      oxyCODONE   "2.5 mg Oral Q4H PRN Radha Steen MD      Or    oxyCODONE  5 mg Oral Q4H PRN Radha Steen MD      patient maintained insulin pump  1 each Subcutaneous Q8H Radha Steen MD      polyethylene glycol  17 g Oral Daily Radha Steen MD      prochlorperazine  5 mg Oral Q6H PRN Bryan Claire MD      senna-docusate sodium  1 tablet Oral BID Radha Steen MD          Today, Patient Was Seen By: Bryan Claire MD    **Please Note: This note may have been constructed using a voice recognition system.**[facility]  Progress Note  Name: Mateus Mckeon I  MRN: [id number]  Unit/Bed#: [facility] 718-01 I Date of Admission: 1/5/2024   Date of Service: 1/22/2024 I Hospital Day: 17    Assessment/Plan   * Acute CVA (cerebrovascular accident) (HCC)  Assessment & Plan  RUE weakness with mild facial droop 1/12-13  MRI brain \"Crescendo multifocal acute/recent infarctions involving at least 3 discrete vascular territories (left posterior cerebral artery and bilateral middle cerebral artery territories), worrisome for multifocal new/interval embolic infarctions\"  Continue neuro-checks  ASA 81 mg daily and atorvastatin 40mg daily.   Telemetry.  No events  Echo 1/5 no PFO  Cleared for neurology standpoint   Nsx cleared patient for dvt ppx  Started on heparin  Patient will need to Follow up with Neurosurgery in 6 weeks for MRI  PT/OT/PMR    Hypertensive emergency  Assessment & Plan  Currently on weekly clonidine patch , hydralazine 100mg TID, lisinopril 40mg qd, labetalol 800 mg TID, procardia (increased to 60mg am/90mg pm)  Prn IV labetalol and hydralazine   Further volume removal by HD  Nitro drip stopped   Will closely monitor blood pressure  Discussed with nephrology. Appreciate input.       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 Mittal, 1/5/2024, " 1/12/2024)4.   On ASA for previous strokes reversed with DDAVP.  MRIs negative for source of SAH, small scattered acute/subacute infarcts noted.   CT head wo 1/9/24: Improving subarachnoid hemorrhage compared to recent prior studies. No new findings.     Plan:  ASA 81 mg daily resumed and Nimodipine discontinued per NSX recs 1/12.  No further need for SAH pathway per NSx.  Continue neurochecks  BP control has been very challenging. Goal of SBP<160      End stage kidney disease ([facility])  Assessment & Plan  Dialysis per nephrology     Anemia in chronic kidney disease, on chronic dialysis ([facility])  Assessment & Plan  Lab Results   Component Value Date    EGFR 6 01/21/2024    EGFR 8 01/20/2024    EGFR 8 01/18/2024    CREATININE 9.64 (H) 01/21/2024    CREATININE 7.42 (H) 01/20/2024    CREATININE 7.06 (H) 01/18/2024   hgb is stable  Continue to monitor   Dialysis MWF    Type 1 diabetes mellitus ([facility])  Assessment & Plan  Lab Results   Component Value Date    HGBA1C 6.0 (H) 01/14/2024       Recent Labs     01/21/24  1109 01/21/24  1816 01/21/24  2300 01/22/24  0551   POCGLU 235* 183* 182* 152*     Managed with insulin pump.    Blood Sugar Average: Last 72 hrs:  (P) 160.9141902268705923    Blood glucose very well controlled  Continue using pump  Continue to monitor

## 2024-01-22 NOTE — PLAN OF CARE
Problem: PAIN - ADULT  Goal: Verbalizes/displays adequate comfort level or baseline comfort level  Description: Interventions:  - Encourage patient to monitor pain and request assistance  - Assess pain using appropriate pain scale  - Administer analgesics based on type and severity of pain and evaluate response  - Implement non-pharmacological measures as appropriate and evaluate response  - Consider cultural and social influences on pain and pain management  - Notify physician/advanced practitioner if interventions unsuccessful or patient reports new pain  Outcome: Progressing     Problem: INFECTION - ADULT  Goal: Absence or prevention of progression during hospitalization  Description: INTERVENTIONS:  - Assess and monitor for signs and symptoms of infection  - Monitor lab/diagnostic results  - Monitor all insertion sites, i.e. indwelling lines, tubes, and drains  - Monitor endotracheal if appropriate and nasal secretions for changes in amount and color  - [place] appropriate cooling/warming therapies per order  - Administer medications as ordered  - Instruct and encourage patient and family to use good hand hygiene technique  - Identify and instruct in appropriate isolation precautions for identified infection/condition  Outcome: Progressing     Problem: SAFETY ADULT  Goal: Patient will remain free of falls  Description: INTERVENTIONS:  - Educate patient/family on patient safety including physical limitations  - Instruct patient to call for assistance with activity   - Consult OT/PT to assist with strengthening/mobility   - Keep Call bell within reach  - Keep bed low and locked with side rails adjusted as appropriate  - Keep care items and personal belongings within reach  - Initiate and maintain comfort rounds  - Make Fall Risk Sign visible to staff  - Offer Toileting every 2 Hours, in advance of need  - Initiate/Maintain bed alarm  - Obtain necessary fall risk management equipment: non skid footwear  - Apply  yellow socks and bracelet for high fall risk patients  - Consider moving patient to room near nurses station  Outcome: Progressing  Goal: Maintain or return to baseline ADL function  Description: INTERVENTIONS:  -  Assess patient's ability to carry out ADLs; assess patient's baseline for ADL function and identify physical deficits which impact ability to perform ADLs (bathing, care of mouth/teeth, toileting, grooming, dressing, etc.)  - Assess/evaluate cause of self-care deficits   - Assess range of motion  - Assess patient's mobility; develop plan if impaired  - Assess patient's need for assistive devices and provide as appropriate  - Encourage maximum independence but intervene and supervise when necessary  - Involve family in performance of ADLs  - Assess for home care needs following discharge   - Consider OT consult to assist with ADL evaluation and planning for discharge  - Provide patient education as appropriate  Outcome: Progressing  Goal: Maintains/Returns to pre admission functional level  Description: INTERVENTIONS:  - Perform AM-PAC 6 Click Basic Mobility/ Daily Activity assessment daily.  - Set and communicate daily mobility goal to care team and patient/family/caregiver.   - Collaborate with rehabilitation services on mobility goals if consulted  - Perform Range of Motion 3 times a day.  - Reposition patient every 2 hours.  - Dangle patient 3 times a day  - Stand patient 3 times a day  - Ambulate patient 3 times a day  - Out of bed to chair 3 times a day   - Out of bed for meals 3 times a day  - Out of bed for toileting  - Record patient progress and toleration of activity level   Outcome: Progressing     Problem: DISCHARGE PLANNING  Goal: Discharge to home or other facility with appropriate resources  Description: INTERVENTIONS:  - Identify barriers to discharge w/patient and caregiver  - Arrange for needed discharge resources and transportation as appropriate  - Identify discharge learning needs  (meds, wound care, etc.)  - Arrange for interpretive services to assist at discharge as needed  - Refer to Case Management Department for coordinating discharge planning if the patient needs post-hospital services based on physician/advanced practitioner order or complex needs related to functional status, cognitive ability, or social support system  Outcome: Progressing     Problem: Knowledge Deficit  Goal: Patient/family/caregiver demonstrates understanding of disease process, treatment plan, medications, and discharge instructions  Description: Complete learning assessment and assess knowledge base.  Interventions:  - Provide teaching at level of understanding  - Provide teaching via preferred learning methods  Outcome: Progressing     Problem: Nutrition/Hydration-ADULT  Goal: Nutrient/Hydration intake appropriate for improving, restoring or maintaining nutritional needs  Description: Monitor and assess patient's nutrition/hydration status for malnutrition. Collaborate with interdisciplinary team and initiate plan and interventions as ordered.  Monitor patient's weight and dietary intake as ordered or per policy. Utilize nutrition screening tool and intervene as necessary. Determine patient's food preferences and provide high-protein, high-caloric foods as appropriate.     INTERVENTIONS:  - Monitor oral intake, urinary output, labs, and treatment plans  - Assess nutrition and hydration status and recommend course of action  - Evaluate amount of meals eaten  - Assist patient with eating if necessary   - Allow adequate time for meals  - Recommend/ encourage appropriate diets, oral nutritional supplements, and vitamin/mineral supplements  - Order, calculate, and assess calorie counts as needed  - Recommend, monitor, and adjust tube feedings and TPN/PPN based on assessed needs  - Assess need for intravenous fluids  - Provide specific nutrition/hydration education as appropriate  - Include patient/family/caregiver in  decisions related to nutrition  Outcome: Progressing     Problem: METABOLIC, FLUID AND ELECTROLYTES - ADULT  Goal: Electrolytes maintained within normal limits  Description: INTERVENTIONS:  - Monitor labs and assess patient for signs and symptoms of electrolyte imbalances  - Administer electrolyte replacement as ordered  - Monitor response to electrolyte replacements, including repeat lab results as appropriate  - Instruct patient on fluid and nutrition as appropriate  Outcome: Progressing  Goal: Fluid balance maintained  Description: INTERVENTIONS:  - Monitor labs   - Monitor I/O and WT  - Instruct patient on fluid and nutrition as appropriate  - Assess for signs & symptoms of volume excess or deficit  Outcome: Progressing     Problem: Prexisting or High Potential for Compromised Skin Integrity  Goal: Skin integrity is maintained or improved  Description: INTERVENTIONS:  - Identify patients at risk for skin breakdown  - Assess and monitor skin integrity  - Assess and monitor nutrition and hydration status  - Monitor labs   - Assess for incontinence   - Turn and reposition patient  - Assist with mobility/ambulation  - Relieve pressure over bony prominences  - Avoid friction and shearing  - Provide appropriate hygiene as needed including keeping skin clean and dry  - Evaluate need for skin moisturizer/barrier cream  - Collaborate with interdisciplinary team   - Patient/family teaching  - Consider wound care consult   Outcome: Progressing

## 2024-01-22 NOTE — PHYSICAL THERAPY NOTE
PHYSICAL THERAPY CANCEL NOTE          Patient Name: Mateus Mckeon  Today's Date: 1/22/2024 01/22/24 1417   PT Last Visit   PT Visit Date 01/22/24   Note Type   Note Type Cancelled Session   Cancel Reasons Other     Attempted to see pt this PM however requesting rest as he had hemodialysis this AM. Will continue to follow as able. Thank you.    Elliot Mclaughlin, PT, DPT

## 2024-01-22 NOTE — ASSESSMENT & PLAN NOTE
Lab Results   Component Value Date    HGBA1C 6.0 (H) 01/14/2024       Recent Labs     01/21/24  1109 01/21/24  1816 01/21/24  2300 01/22/24  0551   POCGLU 235* 183* 182* 152*     Managed with insulin pump.    Blood Sugar Average: Last 72 hrs:  (P) [id number]    Blood glucose very well controlled  Continue using pump  Continue to monitor

## 2024-01-22 NOTE — PROGRESS NOTES
[facility]  Progress Note  Name: Mateus Mckeon I  MRN: [id number]  Unit/Bed#: [facility] 718-01 I Date of Admission: 1/5/2024   Date of Service: 1/22/2024 I Hospital Day: 17    Assessment/Plan   Subarachnoid hemorrhage (HCC)  Assessment & Plan  Right basilar cistern SAH of unclear etiology  BD 22, HH 2, MF 3  S/p Cerebral angiography x 2 - both negative for source of hemorrhage (Dr. Mittal, 1/5/2024, 1/12/2024)  Presented on 1/4/2024 with MONTILLA that started on 12/31/23  Hx ASA use for hx multiple strokes, reversed with DDAVP on arrival  MRIs negative for source of SAH; small scattered acute/subacute infarcts noted.   Pt on hemodialysis for CKD.   1/12-1/13 started with new hand weakness and facial droop - multiple new areas of embolic infarcts to left PCA and bilateral MCA.  Pt remains stable in exam today with R racial drop and distal RUE weakness     Imaging:  MRI brain wo, 1/14/2023: 1. Crescendo multifocal acute/recent infarctions involving at least 3 discrete vascular territories (left posterior cerebral artery and bilateral middle cerebral artery territories), worrisome for multifocal new/interval embolic infarctions. Differential diagnosis could include watershed infarctions in the appropriate clinical setting. These recent/acute infarctions are new/superimposed in the setting of previously present smaller foci of diffusion restriction, indicative of progressive/new interval infarctions since 1/8/2024 2. 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 No significant mass effect. 3. White matter changes likely correlate to areas of recent infarction. 4. Right mastoid air cell effusion. Mastoiditis not excluded.    Plan:  Continue to closely monitor neuro exam   Frequent neuro checks per primary team   Repeat STAT CTH/CTA with any acute decline in GCS > 2pts or more in 1 hr  "  Maintain normotensive BP goals, SBP < 160   No further neurosurgical intervention indicated at this time   Case and imaging reviewed this am on rounds   Continue ongoing dispo planning   Pt with hx of strokes. Continue ASA 81mg.   DVT ppx: SCDs, SQH   Medical management per primary team   Pain control per primary team   Mobilize as tolerated, PT/OT   Disposition: requesting rehab secondary to new strokes.   Pt was accepted at [facility] and [facility], but unfortunately these facilities will not allow the pt to manage his own Type 1 DM so therefore the pt is understandably refusing to go to rehab at these locations   Referral was placed to [facility] rehab who will allow pt to manage his own insulin  SLIM and CM coordinating dispo and should hear abck on acceptance today or tomorrow  Anticipate discharge to rehab early this week     From a neurosurgical standpoint, the pt is cleared for discharge to rehab. Neurosurgery will follow from the periphery for the remainder of this hospitalization. Will plan to follow-up with the pt again in 6 weeks time with repeat MRI brain w wo contrast and MRA head wo contrast. Please reach out with any further questions or concerns.       * Acute CVA (cerebrovascular accident) (HCC)  Assessment & Plan  Neurology following  Started with new onset right hand weakness and right facial droop 1/12-1/13 concerning for new stroke  MRI brain confirms new multifocal infarcts  CTA head and neck without any acute findings  Asa has been resumed 1/12         Subjective/Objective   Chief Complaint: \" I hate being here\"    Subjective: Patient seen and examined this a.m. on rounds.  No acute events overnight.  Patient expresses anger about still being in the hospital and that his dialysis was cut short over the weekend.  Patient is frustrated with the course he has undergone here.  Patient is understanding why he is still here and is in agreement with discharge to rehab.  He is in agreement for outpatient follow-up " "with neurosurgery.  All questions answered at this time.    Patient states that his right upper extremity weakness remained stable.  He states he got some improvement initially but has felt that this has plateaued recently he has made no significant improvements over the weekend.    Objective: Middle-age male lying comfortably in dialysis bed.  No acute distress.    I/O         01/20 0701  01/21 0700 01/21 0701  01/22 0700 01/22 0701  01/23 0700    P.O. 662 660     I.V. (mL/kg)   200 (2.1)    Total Intake(mL/kg) 662 (7) 660 (7) 200 (2.1)    Urine (mL/kg/hr)  950 (0.4)     Other       Total Output  950     Net +662 -290 +200           Unmeasured Stool Occurrence 1 x            Invasive Devices       Peripheral Intravenous Line  Duration             Peripheral IV 01/21/24 Right Antecubital <1 day              Line  Duration             Hemodialysis AV Fistula 11/09/20 Left Other (Comment) 1169 days              Drain  Duration             External Urinary Catheter <1 day                  Physical Exam:  Vitals: Blood pressure (!) 176/99, pulse 80, temperature 97.6 °F (36.4 °C), temperature source Oral, resp. rate 18, height 5' 5\" (1.651 m), weight 94.8 kg (208 lb 15.9 oz), SpO2 96%.,Body mass index is 34.78 kg/m².    General appearance: alert, appears stated age, cooperative and no distress  Head: Normocephalic, without obvious abnormality, atraumatic  Eyes: EOMI, PERRL  Neck: supple, symmetrical, trachea midline and NT  Back: no kyphosis present, no tenderness to percussion or palpation  Lungs: non labored breathing, no respiratory distress on room air  Heart: regular heart rate  Neurologic:   Mental status: Alert, oriented x3, thought content appropriate  Cranial nerves: Right facial droop  Sensory: normal to light touch bilaterally throughout  Motor:   - stable, distal RUE weakness   - RUE: delt/bicep/tricep 4+/5, wrist flex 3/5, wrist ext 2-3/5,  0-1/5   - otherwise strength intact   Reflexes: 2+ and " "symmetric  Coordination:  RUE drift and ataxia noted     Lab Results:  Results from last 7 days   Lab Units 01/21/24  0848 01/20/24  0604 01/18/24  0558   WBC Thousand/uL 6.16 5.49 7.25   HEMOGLOBIN g/dL 8.6* 8.4* 7.7*   HEMATOCRIT % 26.9* 25.6* 23.9*   PLATELETS Thousands/uL 272 248 242   NEUTROS PCT % 66 60 62   MONOS PCT % 7 9 11   EOS PCT % 5 5 4     Results from last 7 days   Lab Units 01/22/24  0835 01/21/24  0848 01/20/24  0604 01/18/24  0558   SODIUM mmol/L 136 138 139 139   POTASSIUM mmol/L 6.3* 5.5* 4.7 4.2   CHLORIDE mmol/L 99 100 101 99   CO2 mmol/L 26 29 29 32   BUN mg/dL 49* 38* 27* 22   CREATININE mg/dL 10.84* 9.64* 7.42* 7.06*   CALCIUM mg/dL 8.1* 8.5 7.9* 7.9*   ALK PHOS U/L  --  88 72 68   ALT U/L  --  9 11 10   AST U/L  --  12* 12* 14                 No results found for: \"TROPONINT\"  ABG:  Lab Results   Component Value Date    PHART 7.454 (H) 02/21/2020    VVN9MUH 33.2 (L) 02/21/2020    PO2ART 170.0 (H) 02/21/2020    ZPS2RVG 22.8 02/21/2020    BEART -0.8 02/21/2020    SOURCE Radial, Right 02/21/2020       Imaging Studies: I have personally reviewed pertinent reports.   and I have personally reviewed pertinent films in PACS    CT head wo contrast    Result Date: 1/9/2024  Impression: Improving subarachnoid hemorrhage compared to recent prior studies. No new findings. Other stable and nonemergent findings above. Workstation performed: [id number]     MRI brain w wo contrast    Result Date: 1/8/2024  Impression: Stable hematoma in the right CP angle, prepontine and premedullary cisterns. New subarachnoid hemorrhage over the convexities and within the lateral ventricles likely due to redistribution. Few tiny scattered recent infarcts in multiple vascular distributions. Study marked in epic for notification. Workstation performed: [id number]     MRI cervical spine w wo contrast    Result Date: 1/8/2024  Impression: No identifiable etiology for subarachnoid hemorrhage. Mild congenital canal stenosis. New " marrow edema at C5-C6 with severe disc space narrowing that is similar to recent CT. Modic type I endplate degenerative changes favored over infection but recommend clinical correlation and follow-up imaging if warranted. Study marked in epic for notification. Workstation performed: [id number]     CTA head and neck with and without contrast    Result Date: 1/5/2024  Impression: 1.  Small amount of subarachnoid hemorrhage in the right basilar cistern region is again seen without significant change since the prior CT brain exam of the same day. No enhancing brain mass/fluid collection or evidence of vascular malformation. Major intracranial dural venous sinuses are grossly patent. 2.  Mild scattered calcific atherosclerosis of the carotid bifurcation and cavernous carotid segments bilaterally without significant stenosis. Bilateral major cervical/intracranial arteries are patent with normal course and caliber. No arterial occlusion, significant flow-limiting stenosis, or focal saccular aneurysm identified 3.  Nonspecific 4 mm right upper lobe lung nodule is seen. No routine follow-up imaging recommended per current Fleischner Society guidelines for low risk patient. 4.  Opacification of the right mastoid air cells could be secondary to mastoid effusion or mastoiditis, recommend clinical correlation with ENT exam findings. Left mastoid air cells and paranasal sinuses appear well aerated and clear. Workstation performed: [id number]     CT head without contrast    Result Date: 1/5/2024  Impression: 1.  Right prepontine/premedullary cistern subarachnoid hemorrhage. Minimal mass effect on the right middle cerebellar peduncle. Short-term follow-up is recommended. 2.  Opacification of the right mastoid air cells correlate clinically for mastoiditis. I personally discussed this study with REYNALDO MULLER on 1/5/2024 3:43 AM. Workstation performed: [id number]     EKG, Pathology, and Other Studies: I have personally reviewed  pertinent reports.      VTE Pharmacologic Prophylaxis: Sequential compression device (Venodyne)  and Heparin    VTE Mechanical Prophylaxis: sequential compression device

## 2024-01-22 NOTE — PLAN OF CARE
Target UF Goal 4  L as tolerated. Patient dialyzing for 3.5hrs on 2 K bath for serum K of  5.5  per protocol. Treatment plan reviewed with Nephrology.     Post-Dialysis RN Treatment Note    Blood Pressure:  Pre 133/78 mm/Hg  Post 177/98 mmHg   EDW  TBD kg    Weight:  Pre 97 kg   Post 93.1 kg   Mode of weight measurement: Bed Scale   Volume Removed  4502 ml    Treatment duration 180 minutes    NS given  N/A    Treatment shortened? No   Medications given during Rx Not Applicable   Estimated Kt/V  1.06   Access type: AV fistula   Access Issues: No    Report called to primary nurse   Yes Aislinn Comer RN  Problem: METABOLIC, FLUID AND ELECTROLYTES - ADULT  Goal: Electrolytes maintained within normal limits  Description: INTERVENTIONS:  - Monitor labs and assess patient for signs and symptoms of electrolyte imbalances  - Administer electrolyte replacement as ordered  - Monitor response to electrolyte replacements, including repeat lab results as appropriate  - Instruct patient on fluid and nutrition as appropriate  Outcome: Progressing  Goal: Fluid balance maintained  Description: INTERVENTIONS:  - Monitor labs   - Monitor I/O and WT  - Instruct patient on fluid and nutrition as appropriate  - Assess for signs & symptoms of volume excess or deficit  Outcome: Progressing

## 2024-01-23 VITALS
HEART RATE: 69 BPM | TEMPERATURE: 98.7 F | DIASTOLIC BLOOD PRESSURE: 79 MMHG | BODY MASS INDEX: 34.71 KG/M2 | RESPIRATION RATE: 16 BRPM | WEIGHT: 208.34 LBS | SYSTOLIC BLOOD PRESSURE: 154 MMHG | OXYGEN SATURATION: 98 % | HEIGHT: 65 IN

## 2024-01-23 LAB
ALBUMIN SERPL BCP-MCNC: 3.6 G/DL (ref 3.5–5)
ALP SERPL-CCNC: 69 U/L (ref 34–104)
ALT SERPL W P-5'-P-CCNC: 12 U/L (ref 7–52)
ANION GAP SERPL CALCULATED.3IONS-SCNC: 11 MMOL/L
AST SERPL W P-5'-P-CCNC: 19 U/L (ref 13–39)
BASOPHILS # BLD AUTO: 0.07 THOUSANDS/ÂΜL (ref 0–0.1)
BASOPHILS NFR BLD AUTO: 1 % (ref 0–1)
BILIRUB SERPL-MCNC: 0.49 MG/DL (ref 0.2–1)
BUN SERPL-MCNC: 35 MG/DL (ref 5–25)
CALCIUM SERPL-MCNC: 7.8 MG/DL (ref 8.4–10.2)
CHLORIDE SERPL-SCNC: 100 MMOL/L (ref 96–108)
CO2 SERPL-SCNC: 27 MMOL/L (ref 21–32)
CREAT SERPL-MCNC: 8.12 MG/DL (ref 0.6–1.3)
EOSINOPHIL # BLD AUTO: 0.27 THOUSAND/ÂΜL (ref 0–0.61)
EOSINOPHIL NFR BLD AUTO: 5 % (ref 0–6)
ERYTHROCYTE [DISTWIDTH] IN BLOOD BY AUTOMATED COUNT: 14.6 % (ref 11.6–15.1)
GFR SERPL CREATININE-BSD FRML MDRD: 7 ML/MIN/1.73SQ M
GLUCOSE SERPL-MCNC: 154 MG/DL (ref 65–140)
HCT VFR BLD AUTO: 25.3 % (ref 36.5–49.3)
HGB BLD-MCNC: 7.9 G/DL (ref 12–17)
IMM GRANULOCYTES # BLD AUTO: 0.02 THOUSAND/UL (ref 0–0.2)
IMM GRANULOCYTES NFR BLD AUTO: 0 % (ref 0–2)
LYMPHOCYTES # BLD AUTO: 1.39 THOUSANDS/ÂΜL (ref 0.6–4.47)
LYMPHOCYTES NFR BLD AUTO: 24 % (ref 14–44)
MCH RBC QN AUTO: 31.1 PG (ref 26.8–34.3)
MCHC RBC AUTO-ENTMCNC: 31.2 G/DL (ref 31.4–37.4)
MCV RBC AUTO: 100 FL (ref 82–98)
MONOCYTES # BLD AUTO: 0.4 THOUSAND/ÂΜL (ref 0.17–1.22)
MONOCYTES NFR BLD AUTO: 7 % (ref 4–12)
NEUTROPHILS # BLD AUTO: 3.69 THOUSANDS/ÂΜL (ref 1.85–7.62)
NEUTS SEG NFR BLD AUTO: 63 % (ref 43–75)
NRBC BLD AUTO-RTO: 0 /100 WBCS
PLATELET # BLD AUTO: 255 THOUSANDS/UL (ref 149–390)
PMV BLD AUTO: 10.7 FL (ref 8.9–12.7)
POTASSIUM SERPL-SCNC: 5.1 MMOL/L (ref 3.5–5.3)
PROT SERPL-MCNC: 5.5 G/DL (ref 6.4–8.4)
RBC # BLD AUTO: 2.54 MILLION/UL (ref 3.88–5.62)
SODIUM SERPL-SCNC: 138 MMOL/L (ref 135–147)
WBC # BLD AUTO: 5.84 THOUSAND/UL (ref 4.31–10.16)

## 2024-01-23 PROCEDURE — 80053 COMPREHEN METABOLIC PANEL: CPT | Performed by: FAMILY MEDICINE

## 2024-01-23 PROCEDURE — 82948 REAGENT STRIP/BLOOD GLUCOSE: CPT

## 2024-01-23 PROCEDURE — 97116 GAIT TRAINING THERAPY: CPT

## 2024-01-23 PROCEDURE — 99239 HOSP IP/OBS DSCHRG MGMT >30: CPT | Performed by: STUDENT IN AN ORGANIZED HEALTH CARE EDUCATION/TRAINING PROGRAM

## 2024-01-23 PROCEDURE — 99232 SBSQ HOSP IP/OBS MODERATE 35: CPT | Performed by: INTERNAL MEDICINE

## 2024-01-23 PROCEDURE — 99232 SBSQ HOSP IP/OBS MODERATE 35: CPT | Performed by: STUDENT IN AN ORGANIZED HEALTH CARE EDUCATION/TRAINING PROGRAM

## 2024-01-23 PROCEDURE — 85025 COMPLETE CBC W/AUTO DIFF WBC: CPT | Performed by: FAMILY MEDICINE

## 2024-01-23 RX ORDER — LABETALOL 200 MG/1
800 TABLET, FILM COATED ORAL 3 TIMES DAILY
Qty: 180 TABLET | Refills: 1 | Status: SHIPPED | OUTPATIENT
Start: 2024-01-23 | End: 2024-01-27

## 2024-01-23 RX ORDER — LISINOPRIL 40 MG/1
40 TABLET ORAL DAILY
Qty: 30 TABLET | Refills: 0 | Status: SHIPPED | OUTPATIENT
Start: 2024-01-23 | End: 2024-01-23

## 2024-01-23 RX ORDER — NIFEDIPINE 90 MG/1
90 TABLET, EXTENDED RELEASE ORAL 2 TIMES DAILY
Qty: 30 TABLET | Refills: 0 | Status: SHIPPED | OUTPATIENT
Start: 2024-01-23 | End: 2024-01-23

## 2024-01-23 RX ORDER — LABETALOL 300 MG/1
800 TABLET, FILM COATED ORAL 3 TIMES DAILY
Qty: 240 TABLET | Refills: 0 | Status: SHIPPED | OUTPATIENT
Start: 2024-01-23 | End: 2024-01-23

## 2024-01-23 RX ORDER — CINACALCET 60 MG/1
60 TABLET, FILM COATED ORAL DAILY
Qty: 30 TABLET | Refills: 0 | Status: ON HOLD | OUTPATIENT
Start: 2024-01-23

## 2024-01-23 RX ORDER — HYDRALAZINE HYDROCHLORIDE 100 MG/1
100 TABLET, FILM COATED ORAL 3 TIMES DAILY
Qty: 90 TABLET | Refills: 1 | Status: SHIPPED | OUTPATIENT
Start: 2024-01-23 | End: 2024-01-23

## 2024-01-23 RX ORDER — HYDRALAZINE HYDROCHLORIDE 100 MG/1
100 TABLET, FILM COATED ORAL 3 TIMES DAILY
Qty: 90 TABLET | Refills: 0 | Status: ON HOLD | OUTPATIENT
Start: 2024-01-23

## 2024-01-23 RX ORDER — CALCIUM ACETATE 667 MG/1
667 CAPSULE ORAL 3 TIMES DAILY
Qty: 90 CAPSULE | Refills: 0 | Status: ON HOLD | OUTPATIENT
Start: 2024-01-23

## 2024-01-23 RX ORDER — CALCIUM ACETATE 667 MG/1
667 CAPSULE ORAL 3 TIMES DAILY
Qty: 90 CAPSULE | Refills: 0 | Status: SHIPPED | OUTPATIENT
Start: 2024-01-23 | End: 2024-01-23

## 2024-01-23 RX ORDER — LISINOPRIL 40 MG/1
40 TABLET ORAL DAILY
Qty: 30 TABLET | Refills: 0 | Status: ON HOLD | OUTPATIENT
Start: 2024-01-23

## 2024-01-23 RX ORDER — NIFEDIPINE 90 MG/1
90 TABLET, EXTENDED RELEASE ORAL 2 TIMES DAILY
Qty: 30 TABLET | Refills: 0 | Status: ON HOLD | OUTPATIENT
Start: 2024-01-23

## 2024-01-23 RX ORDER — CINACALCET 60 MG/1
60 TABLET, FILM COATED ORAL DAILY
Qty: 30 TABLET | Refills: 0 | Status: SHIPPED | OUTPATIENT
Start: 2024-01-23 | End: 2024-01-23

## 2024-01-23 RX ADMIN — GABAPENTIN 300 MG: 300 CAPSULE ORAL at 14:58

## 2024-01-23 RX ADMIN — SENNOSIDES, DOCUSATE SODIUM 1 TABLET: 8.6; 5 TABLET ORAL at 08:37

## 2024-01-23 RX ADMIN — ONDANSETRON 4 MG: 2 INJECTION INTRAMUSCULAR; INTRAVENOUS at 00:04

## 2024-01-23 RX ADMIN — CINACALCET 60 MG: 30 TABLET ORAL at 08:37

## 2024-01-23 RX ADMIN — CLONIDINE HYDROCHLORIDE 0.3 MG: 0.2 TABLET ORAL at 05:57

## 2024-01-23 RX ADMIN — LABETALOL HYDROCHLORIDE 800 MG: 200 TABLET, FILM COATED ORAL at 14:58

## 2024-01-23 RX ADMIN — CLONIDINE HYDROCHLORIDE 0.3 MG: 0.2 TABLET ORAL at 14:58

## 2024-01-23 RX ADMIN — HYDRALAZINE HYDROCHLORIDE 100 MG: 50 TABLET ORAL at 08:38

## 2024-01-23 RX ADMIN — HYDRALAZINE HYDROCHLORIDE 100 MG: 50 TABLET ORAL at 14:58

## 2024-01-23 RX ADMIN — HEPARIN SODIUM 5000 UNITS: 5000 INJECTION INTRAVENOUS; SUBCUTANEOUS at 05:57

## 2024-01-23 RX ADMIN — LISINOPRIL 40 MG: 20 TABLET ORAL at 08:38

## 2024-01-23 RX ADMIN — LABETALOL HYDROCHLORIDE 800 MG: 200 TABLET, FILM COATED ORAL at 08:37

## 2024-01-23 RX ADMIN — CALCIUM ACETATE 667 MG: 667 CAPSULE ORAL at 11:46

## 2024-01-23 RX ADMIN — NIFEDIPINE 90 MG: 30 TABLET, FILM COATED, EXTENDED RELEASE ORAL at 08:37

## 2024-01-23 RX ADMIN — ASPIRIN 81 MG CHEWABLE TABLET 81 MG: 81 TABLET CHEWABLE at 08:38

## 2024-01-23 RX ADMIN — CALCIUM ACETATE 667 MG: 667 CAPSULE ORAL at 09:16

## 2024-01-23 RX ADMIN — FAMOTIDINE 40 MG: 20 TABLET, FILM COATED ORAL at 08:38

## 2024-01-23 NOTE — ASSESSMENT & PLAN NOTE
Outpatient on weekly clonidine patch , hydralazine 100mg TID, lisinopril 40mg qd, labetalol 800 mg TID, procardia (increased to 60mg am/90mg pm)  While inpatient clonidine 0.3 mg p.o. every 8 hours, hydralazine 100 mg 3 times daily, labetalol 800 mg 3 times daily, lisinopril 40 mg daily and Procardia 90 mg p.o. twice daily  Also managed with volume removal with HD  Prn IV labetalol and hydralazine   BP reviewed and above goal, Goal of SBP<160 in the setting of SAH

## 2024-01-23 NOTE — ARC ADMISSION
Notified by CM that patient has progressed with therapy and will now discharge home with [facility] services. Please notify with any changes.

## 2024-01-23 NOTE — CASE MANAGEMENT
Case Management Discharge Planning Note    Patient name Mateus Mckeon  Location [facility] 718/[facility] 718-01 MRN [id number]  : 1985 Date 2024       Current Admission Date: 2024  Current Admission Diagnosis:Acute CVA (cerebrovascular accident) ([facility])   Patient Active Problem List    Diagnosis Date Noted    Acute CVA (cerebrovascular accident) ([facility]) 2024    Primary hypertension 2024    Abnormal finding on MRI of brain 2024    Subarachnoid hemorrhage (HCC) 2024    Anemia due to chronic kidney disease, on chronic dialysis ([facility]) 2024    Type 1 diabetes mellitus on insulin therapy ([facility]) 2023    Hyperlipidemia 2023    Anxiety 2023    Insomnia 2023    RACHEAL (obstructive sleep apnea)     Status post placement of implantable loop recorder 2022    Coronary artery disease involving native coronary artery of native heart without angina pectoris 2022    Pulmonary HTN ([facility]) 2022    Hypothyroidism 2022    Cerebellar stroke syndrome 2022    Anemia in chronic kidney disease 2022    Essential (primary) hypertension 2022    Numbness of arm 2021    Mild episode of recurrent major depressive disorder ([facility]) 2021    Generalized anxiety disorder 2021    Medical cannabis use 04/15/2021    Tubulovillous adenoma of colon 2021    GERD (gastroesophageal reflux disease) 2021    End stage kidney disease ([facility]) 2020    Secondary hyperparathyroidism ([facility]) 10/23/2020    Diabetic neuropathy ([facility]) 2020    Provoked seizure ([facility]) 2020    Asterixis 2020    Foot drop, bilateral 2020    Impaired mobility and ADLs 2020    Vertigo 2020    Multiple pulmonary nodules 2019    Gastroparesis diabeticorum  2019    Pruritus 2019    Environmental and seasonal allergies 2019    Strabismus 2018    Dyslipidemia 2018    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) 06/04/2018    Renovascular hypertension 03/26/2018    Left atrial dilation 02/27/2018    Bilateral leg edema 02/19/2018    Persistent proteinuria 02/11/2018    Anemia in chronic kidney disease, on chronic dialysis (HCC) 02/10/2018    Hypertension 02/09/2018    Homozygous MTHFR mutation C677T 02/05/2018    Hyperphosphatemia 01/29/2018    Vitamin D deficiency 01/29/2018    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,   01/28/2018    Binocular visual disturbance 01/28/2018    History of lacunar cerebrovascular accident (CVA) 01/22/2018    Type 1 diabetes mellitus (HCC) 06/19/2017    Ataxia 07/21/2015    Gastroenteritis 07/21/2015      LOS (days): 18  Geometric Mean LOS (GMLOS) (days): 4.6  Days to GMLOS:-13.8     OBJECTIVE:  Risk of Unplanned Readmission Score: 41.04         Current admission status: Inpatient   Preferred Pharmacy:   [facility] [address] [place] [id number]  Phone: [phone] Fax: [phone]    [facility] #[id number] [address] [id number] [facility] [address]  Phone: [phone] Fax: [phone]    Primary Care Provider: Dania Sher DO    Primary Insurance: MEDICARE  Secondary Insurance: [facility]    DISCHARGE DETAILS:       Freedom of Choice: Yes  Comments - Freedom of Choice: Pt and family would like to discharge home with OP PT/OT  CM contacted family/caregiver?: Yes  Were Treatment Team discharge recommendations reviewed with patient/caregiver?: Yes  Did patient/caregiver verbalize understanding of patient care needs?: Yes  Were patient/caregiver advised of the risks associated with not following Treatment Team discharge recommendations?: Yes    Contacts  Patient Contacts: Francine Mckeon  Relationship to Patient:: Family (parent)  Contact Method: Phone  Phone Number: [phone]  Reason/Outcome: Continuity of  Care, Emergency Contact, Referral, Discharge Planning    Requested Home Health Care         Is the patient interested in HHC at discharge?: No    DME Referral Provided  Referral made for DME?: No         Would you like to participate in our [facility] service program?  : No - Declined    Treatment Team Recommendation: Home  Discharge Destination Plan:: Home           Additional Comments: PT and family would like pt to discharge home with OP PT/OT. Pt still has his chair time at [facility] at6am pt father transports him

## 2024-01-23 NOTE — ASSESSMENT & PLAN NOTE
Lab Results   Component Value Date    HGBA1C 6.0 (H) 01/14/2024       Recent Labs     01/21/24  1816 01/21/24  2300 01/22/24  0551 01/22/24  2344   POCGLU 183* 182* 152* 170*     Managed with insulin pump.    Blood Sugar Average: Last 72 hrs:  (P) [id number]    Blood glucose very well controlled with patient managing his own pump  Continue using pump  Continue to monitor, Accu-Cheks reviewed

## 2024-01-23 NOTE — CASE MANAGEMENT
Case Management Discharge Planning Note    Patient name Mateus Mckeon  Location [facility] 718/[facility] 718-01 MRN [id number]  : 1985 Date 2024       Current Admission Date: 2024  Current Admission Diagnosis:Acute CVA (cerebrovascular accident) ([facility])   Patient Active Problem List    Diagnosis Date Noted    Acute CVA (cerebrovascular accident) ([facility]) 2024    Hypertensive emergency 2024    Abnormal finding on MRI of brain 2024    Subarachnoid hemorrhage (HCC) 2024    Anemia due to chronic kidney disease, on chronic dialysis ([facility]) 2024    Type 1 diabetes mellitus on insulin therapy ([facility]) 2023    Hyperlipidemia 2023    Anxiety 2023    Insomnia 2023    RACHEAL (obstructive sleep apnea)     Status post placement of implantable loop recorder 2022    Coronary artery disease involving native coronary artery of native heart without angina pectoris 2022    Pulmonary HTN ([facility]) 2022    Hypothyroidism 2022    Cerebellar stroke syndrome 2022    Anemia in chronic kidney disease 2022    Essential (primary) hypertension 2022    Numbness of arm 2021    Mild episode of recurrent major depressive disorder ([facility]) 2021    Generalized anxiety disorder 2021    Medical cannabis use 04/15/2021    Tubulovillous adenoma of colon 2021    GERD (gastroesophageal reflux disease) 2021    End stage kidney disease ([facility]) 2020    Secondary hyperparathyroidism ([facility]) 10/23/2020    Diabetic neuropathy ([facility]) 2020    Provoked seizure ([facility]) 2020    Asterixis 2020    Foot drop, bilateral 2020    Impaired mobility and ADLs 2020    Vertigo 2020    Multiple pulmonary nodules 2019    Gastroparesis diabeticorum  2019    Pruritus 2019    Environmental and seasonal allergies 2019    Strabismus 2018    Dyslipidemia 2018    Heterozygous for prothrombin [id number]  mutation (HCC) 06/04/2018    Renovascular hypertension 03/26/2018    Left atrial dilation 02/27/2018    Bilateral leg edema 02/19/2018    Persistent proteinuria 02/11/2018    Anemia in chronic kidney disease, on chronic dialysis (HCC) 02/10/2018    Hypertension 02/09/2018    Homozygous MTHFR mutation C677T 02/05/2018    Hyperphosphatemia 01/29/2018    Vitamin D deficiency 01/29/2018    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,   01/28/2018    Binocular visual disturbance 01/28/2018    History of lacunar cerebrovascular accident (CVA) 01/22/2018    Type 1 diabetes mellitus (HCC) 06/19/2017    Ataxia 07/21/2015    Gastroenteritis 07/21/2015      LOS (days): 18  Geometric Mean LOS (GMLOS) (days): 4.6  Days to GMLOS:-13.8     OBJECTIVE:  Risk of Unplanned Readmission Score: 41.04         Current admission status: Inpatient   Preferred Pharmacy:   [facility] [address] [place] [id number]  Phone: [phone] Fax: [phone]    [facility] #[id number] [facility] [address] [address] [facility] [address]  Phone: [phone] Fax: [phone]    Primary Care Provider: Dania Sher DO    Primary Insurance: MEDICARE  Secondary Insurance: [facility]    DISCHARGE DETAILS:           Additional Comments: CM spoke wit hpt mother bedside re: DCP. CM provided upate on [facility] facilities. CM told them that all HD patients go to [facility]. CM notified in Aidin they are in agreement. As per Aidin response from [facility] Pt is conditionally approved.  He would need to demonstrate consistent participation in therapy, (he refused yesterday after HD)...he needs to be agreeable to three hour a day therapy time requirement 5-7 days a week.  If he is not agreeable, recommendation would be subacute level of rehab.  All HD pts go to our [facility] site.  I  As for the pt self-management of his  insulin, he would need to be assessed by our team to see if it is safe for him to manage it independently when in rehab. CM will reach out to PT/OT and request pt be seen later in the day or see if chair time is able to be changed

## 2024-01-23 NOTE — PROGRESS NOTES
[facility]  Progress Note  Name: Mateus Mckeon I  MRN: [id number]  Unit/Bed#: [facility] 718-01 I Date of Admission: 1/5/2024   Date of Service: 1/23/2024 I Hospital Day: 18    Assessment/Plan   Primary hypertension  Assessment & Plan  Outpatient on weekly clonidine patch , hydralazine 100mg TID, lisinopril 40mg qd, labetalol 800 mg TID, procardia (increased to 60mg am/90mg pm)  While inpatient clonidine 0.3 mg p.o. every 8 hours, hydralazine 100 mg 3 times daily, labetalol 800 mg 3 times daily, lisinopril 40 mg daily and Procardia 90 mg p.o. twice daily  Also managed with volume removal with HD  Prn IV labetalol and hydralazine   BP reviewed and above goal, Goal of SBP<160 in the setting of SAH      Subarachnoid hemorrhage (HCC)  Assessment & Plan  Presented with significant headaches started since 12/31/2023  Right basilar cistern SAH of unclear etiology  S/p Cerebral angiography x 2 - both negative (Dr. Mittal, 1/5/2024, 1/12/2024)4.   On ASA for previous strokes reversed with DDAVP.  MRIs negative for source of SAH, small scattered acute/subacute infarcts noted.   CT head wo 1/9/24: Improving subarachnoid hemorrhage compared to recent prior studies. No new findings.   ASA 81 mg daily resumed and Nimodipine discontinued per NSX recs 1/12.  No further need for SAH pathway per NSx.      End stage kidney disease (HCC)  Assessment & Plan  Dialysis per nephrology     Anemia in chronic kidney disease, on chronic dialysis (HCC)  Assessment & Plan  Hemoglobin stable.  Continue to monitor  Goal hemoglobin above 7    Type 1 diabetes mellitus (HCC)  Assessment & Plan  Lab Results   Component Value Date    HGBA1C 6.0 (H) 01/14/2024       Recent Labs     01/21/24  1816 01/21/24  2300 01/22/24  0551 01/22/24  2344   POCGLU 183* 182* 152* 170*     Managed with insulin pump.    Blood Sugar Average: Last 72 hrs:  (P) 167.6877506600654818    Blood glucose very well controlled with patient  "managing his own pump  Continue using pump  Continue to monitor, Accu-Cheks reviewed    * Acute CVA (cerebrovascular accident) (HCC)  Assessment & Plan  Patient developed RUE weakness with mild facial droop on 1/12  Hx of prior cerebellar and pontine strokes  MRI brain \"Crescendo multifocal acute/recent infarctions involving at least 3 discrete vascular territories (left posterior cerebral artery and bilateral middle cerebral artery territories), worrisome for multifocal new/interval embolic infarctions\"  Continue ASA 81 mg daily and atorvastatin 40mg daily.   Echo 1/5 with no PFO  Cleared for neurology standpoint   Nsx cleared patient for dvt ppx-On heparin sub q  Patient will need to Follow up with Neurosurgery in 6 weeks for MRI  PT/OT/PMR recommending rehab at discharge.  Patient medically stable.  Awaiting rehab acceptance             VTE Pharmacologic Prophylaxis: VTE Score: 7 High Risk (Score >/= 5) - Pharmacological DVT Prophylaxis Ordered: heparin. Sequential Compression Devices Ordered.    Mobility:   Basic Mobility Inpatient Raw Score: 24  JH-HLM Goal: 8: Walk 250 feet or more  JH-HLM Achieved: 8: Walk 250 feet ot more  HLM Goal achieved. Continue to encourage appropriate mobility.    Patient Centered Rounds: I performed bedside rounds with nursing staff today.   Discussions with Specialists or Other Care Team Provider:     Education and Discussions with Family / Patient: Updated  (mother) at bedside.    Total Time Spent on Date of Encounter in care of patient: 35 mins. This time was spent on one or more of the following: performing physical exam; counseling and coordination of care; obtaining or reviewing history; documenting in the medical record; reviewing/ordering tests, medications or procedures; communicating with other healthcare professionals and discussing with patient's family/caregivers.    Current Length of Stay: 18 day(s)  Current Patient Status: Inpatient "   Certification Statement: The patient will continue to require additional inpatient hospital stay due to awaiting rehab  Discharge Plan: Anticipate discharge in 24-48 hrs to rehab facility.    Code Status: Level 1 - Full Code    Subjective:   No acute events overnight.  Patient reports feeling well this morning.  Ambulating with walker.  Tolerating oral intake.    Objective:     Vitals:   Temp (24hrs), Av.7 °F (37.1 °C), Min:98.6 °F (37 °C), Max:98.8 °F (37.1 °C)    Temp:  [98.6 °F (37 °C)-98.8 °F (37.1 °C)] 98.6 °F (37 °C)  HR:  [72-82] 74  Resp:  [16] 16  BP: (129-170)/(66-94) 153/76  SpO2:  [94 %-98 %] 98 %  Body mass index is 34.67 kg/m².     Input and Output Summary (last 24 hours):   No intake or output data in the 24 hours ending 24 1423    Physical Exam:   Physical Exam  Vitals and nursing note reviewed.   Constitutional:       General: He is not in acute distress.     Appearance: He is well-developed.   HENT:      Head: Normocephalic and atraumatic.   Eyes:      Conjunctiva/sclera: Conjunctivae normal.   Cardiovascular:      Rate and Rhythm: Normal rate and regular rhythm.   Pulmonary:      Effort: Pulmonary effort is normal. No respiratory distress.      Breath sounds: Normal breath sounds.   Musculoskeletal:         General: No swelling.      Cervical back: Neck supple.   Skin:     General: Skin is warm and dry.   Neurological:      Mental Status: He is alert.   Psychiatric:         Mood and Affect: Mood normal.         Behavior: Behavior normal.          Additional Data:     Labs:  Results from last 7 days   Lab Units 24  0608   WBC Thousand/uL 5.84   HEMOGLOBIN g/dL 7.9*   HEMATOCRIT % 25.3*   PLATELETS Thousands/uL 255   NEUTROS PCT % 63   LYMPHS PCT % 24   MONOS PCT % 7   EOS PCT % 5     Results from last 7 days   Lab Units 24  0608   SODIUM mmol/L 138   POTASSIUM mmol/L 5.1   CHLORIDE mmol/L 100   CO2 mmol/L 27   BUN mg/dL 35*   CREATININE mg/dL 8.12*   ANION GAP mmol/L 11    CALCIUM mg/dL 7.8*   ALBUMIN g/dL 3.6   TOTAL BILIRUBIN mg/dL 0.49   ALK PHOS U/L 69   ALT U/L 12   AST U/L 19   GLUCOSE RANDOM mg/dL 154*         Results from last 7 days   Lab Units 01/22/24  2344 01/22/24  0551 01/21/24  2300 01/21/24  1816 01/21/24  1109 01/21/24  0641 01/20/24  2312 01/20/24  1811 01/20/24  1307 01/20/24  0617 01/20/24  0029 01/19/24  1922   POC GLUCOSE mg/dl 170* 152* 182* 183* 235* 154* 120 242* 126 135 139 204*               Lines/Drains:  Invasive Devices       Peripheral Intravenous Line  Duration             Peripheral IV 01/21/24 Right Antecubital 1 day              Line  Duration             Hemodialysis AV Fistula 11/09/20 Left Other (Comment) 1170 days              Drain  Duration             External Urinary Catheter 1 day                          Imaging: No pertinent imaging reviewed.    Recent Cultures (last 7 days):         Last 24 Hours Medication List:   Current Facility-Administered Medications   Medication Dose Route Frequency Provider Last Rate    aspirin  81 mg Oral Daily Malu Moore PA-C      atorvastatin  40 mg Oral QPM Radha Steen MD      bisacodyl  10 mg Rectal Daily PRN Radha Steen MD      calcium acetate  667 mg Oral TID With Meals Radha Steen MD      cinacalcet  60 mg Oral Daily Radha Steen MD      cloNIDine  0.3 mg Oral Q8H [facility] Valeria Forrester PA-C      escitalopram  20 mg Oral HS Bryan Claire MD      famotidine  40 mg Oral Daily Radha Steen MD      fentaNYL  25 mcg Intravenous Q3 min PRN Elizabeth Martinez CRNA      gabapentin  300 mg Oral Daily Radha Steen MD      heparin (porcine)  5,000 Units Subcutaneous Q8H [facility] Bryan Claire MD      hydrALAZINE  20 mg Intravenous Q4H PRN Court Edwards MD      hydrALAZINE  100 mg Oral TID Valeria Forrester PA-C      insulin lispro  300 Units Subcutaneous Insulin Pump Daily PRN Radha Steen MD      labetalol  20 mg Intravenous Q4H PRN Valeria Forrestre PA-C      labetalol  800 mg Oral TID Valeria Forrester PA-C       lisinopril  40 mg Oral Daily Valeria Forrester PA-C      melatonin  6 mg Oral HS PRN Cinthia Dempsey MD      NIFEdipine  90 mg Oral BID Valeria Forrester PA-C      nitroGLYcerin  5-200 mcg/min Intravenous Titrated Valeria Forrester PA-C Stopped (01/19/24 1910)    ondansetron  4 mg Intravenous Q4H PRN Radha Steen MD      oxyCODONE  2.5 mg Oral Q4H PRN Radha Steen MD      Or    oxyCODONE  5 mg Oral Q4H PRN Radha Steen MD      patient maintained insulin pump  1 each Subcutaneous Q8H Radha Steen MD      polyethylene glycol  17 g Oral Daily Radha Steen MD      prochlorperazine  5 mg Oral Q6H PRN Bryan Claire MD      senna-docusate sodium  1 tablet Oral BID Radha Steen MD          Today, Patient Was Seen By: Court Edwards MD    **Please Note: This note may have been constructed using a voice recognition system.**

## 2024-01-23 NOTE — PLAN OF CARE
Problem: SAFETY ADULT  Goal: Patient will remain free of falls  Description: INTERVENTIONS:  - Educate patient/family on patient safety including physical limitations  - Instruct patient to call for assistance with activity   - Consult OT/PT to assist with strengthening/mobility   - Keep Call bell within reach  - Keep bed low and locked with side rails adjusted as appropriate  - Keep care items and personal belongings within reach  - Initiate and maintain comfort rounds  - Make Fall Risk Sign visible to staff  - Offer Toileting every 2 Hours, in advance of need  - Initiate/Maintain bed alarm  - Obtain necessary fall risk management equipment: non skid footwear  - Apply yellow socks and bracelet for high fall risk patients  - Consider moving patient to room near nurses station  Outcome: Progressing     Problem: PAIN - ADULT  Goal: Verbalizes/displays adequate comfort level or baseline comfort level  Description: Interventions:  - Encourage patient to monitor pain and request assistance  - Assess pain using appropriate pain scale  - Administer analgesics based on type and severity of pain and evaluate response  - Implement non-pharmacological measures as appropriate and evaluate response  - Consider cultural and social influences on pain and pain management  - Notify physician/advanced practitioner if interventions unsuccessful or patient reports new pain  Outcome: Progressing     Problem: Prexisting or High Potential for Compromised Skin Integrity  Goal: Skin integrity is maintained or improved  Description: INTERVENTIONS:  - Identify patients at risk for skin breakdown  - Assess and monitor skin integrity  - Assess and monitor nutrition and hydration status  - Monitor labs   - Assess for incontinence   - Turn and reposition patient  - Assist with mobility/ambulation  - Relieve pressure over bony prominences  - Avoid friction and shearing  - Provide appropriate hygiene as needed including keeping skin clean and  dry  - Evaluate need for skin moisturizer/barrier cream  - Collaborate with interdisciplinary team   - Patient/family teaching  - Consider wound care consult   Outcome: Progressing     Problem: Nutrition/Hydration-ADULT  Goal: Nutrient/Hydration intake appropriate for improving, restoring or maintaining nutritional needs  Description: Monitor and assess patient's nutrition/hydration status for malnutrition. Collaborate with interdisciplinary team and initiate plan and interventions as ordered.  Monitor patient's weight and dietary intake as ordered or per policy. Utilize nutrition screening tool and intervene as necessary. Determine patient's food preferences and provide high-protein, high-caloric foods as appropriate.     INTERVENTIONS:  - Monitor oral intake, urinary output, labs, and treatment plans  - Assess nutrition and hydration status and recommend course of action  - Evaluate amount of meals eaten  - Assist patient with eating if necessary   - Allow adequate time for meals  - Recommend/ encourage appropriate diets, oral nutritional supplements, and vitamin/mineral supplements  - Order, calculate, and assess calorie counts as needed  - Recommend, monitor, and adjust tube feedings and TPN/PPN based on assessed needs  - Assess need for intravenous fluids  - Provide specific nutrition/hydration education as appropriate  - Include patient/family/caregiver in decisions related to nutrition  Outcome: Progressing

## 2024-01-23 NOTE — ASSESSMENT & PLAN NOTE
"Patient developed RUE weakness with mild facial droop on 1/12  Hx of prior cerebellar and pontine strokes  MRI brain \"Crescendo multifocal acute/recent infarctions involving at least 3 discrete vascular territories (left posterior cerebral artery and bilateral middle cerebral artery territories), worrisome for multifocal new/interval embolic infarctions\"  Continue ASA 81 mg daily and atorvastatin 40mg daily.   Echo 1/5 with no PFO  Cleared for neurology standpoint   Nsx cleared patient for dvt ppx-On heparin sub q  Patient will need to Follow up with Neurosurgery in 6 weeks for MRI  PT/OT/PMR recommending rehab at discharge.  Patient medically stable.  Awaiting rehab acceptance  "

## 2024-01-23 NOTE — RESTORATIVE TECHNICIAN NOTE
Restorative Technician Note      Patient Name: Mateus Mckeon     Note Type: Mobility  Patient Position Upon Consult: Standing  Activity Performed: Ambulated; Dangled; Stood  Assistive Device: Other (Comment) (Rollator Walker)  Education Provided: Yes  Patient Position at End of Consult: Bedside chair; All needs within reach; Bed/Chair alarm activated  Savi YIN, Restorative Technician,

## 2024-01-23 NOTE — QUICK NOTE
"Dialysis RN asked me to speak to the patient for he was looking to speak to a physician regarding his extra treatment today, which was previously agreed and arranged yesterday between patient and HD manager. He was verbally refusing his extra treatment upon entering the dialysis unit.   I was able to introduce myself as a nurse practitioner and explained that  be in later to see the patient.  He reported that he had a stroke and at this time PT and OT is more important.   He reported that he could not perform PT last evening post dialysis for he was too tired and exhausted.  I attempted to explain to the patient the coordination of scheduling PT/OT and dialysis  while hospitalized, but the patient was not receptive to hearing the explanation.  Patient then reported that he does not know why providers speak to him while receiving dialysis. I attempted to explain care and policy while receiving dialysis, and again the patient was not receptive.  The patient then abruptly terminated this conversation stating, \"I'm done with you.\" And requested to leave the dialysis unit.   "

## 2024-01-23 NOTE — PHYSICAL THERAPY NOTE
PHYSICAL THERAPY NOTE          Patient Name: Mateus Mckeon  Today's Date: 1/23/2024 01/23/24 1434   PT Last Visit   PT Visit Date 01/23/24   Note Type   Note Type Treatment   Pain Assessment   Pain Assessment Tool 0-10   Pain Score No Pain   Restrictions/Precautions   Weight Bearing Precautions Per Order No   Other Precautions Cognitive;Chair Alarm;Bed Alarm;Telemetry;Multiple lines;Fall Risk  (RUE hemiparesis)   General   Chart Reviewed Yes   Response to Previous Treatment Patient with no complaints from previous session.   Family/Caregiver Present Yes  (mother)   Cognition   Overall Cognitive Status Impaired   Arousal/Participation Cooperative   Attention Attends with cues to redirect   Orientation Level Oriented X4   Following Commands Follows multistep commands without difficulty   Comments very pleasant to work with   Subjective   Subjective agreeable to participate   Bed Mobility   Supine to Sit Unable to assess   Sit to Supine Unable to assess   Transfers   Sit to Stand 5  Supervision   Additional items Increased time required;Verbal cues   Stand to Sit 5  Supervision   Additional items Increased time required;Verbal cues   Stand pivot 5  Supervision   Additional items Increased time required;Verbal cues   Additional Comments c rollator   Ambulation/Elevation   Gait pattern Decreased foot clearance;Excessively slow;Short stride   Gait Assistance 5  Supervision   Additional items Verbal cues   Assistive Device   (rollator)   Distance 200'   Stair Management Assistance 5  Supervision   Additional items Verbal cues   Stair Management Technique One rail R;Alternating pattern;Foreward;Reciprocal   Number of Stairs 7   Ambulation/Elevation Additional Comments no LOB throughout. able to  rollator with RUE   Balance   Static Sitting Fair +   Dynamic Sitting Fair   Static Standing Fair -   Dynamic Standing Fair -    Ambulatory Poor +   Endurance Deficit   Endurance Deficit Yes   Endurance Deficit Description fatigue, RUE weakness   Activity Tolerance   Activity Tolerance Patient limited by fatigue   Medical Staff Made Aware SPT GERMÁN; ANKIT Fsiher   Assessment   Prognosis Good   Problem List Decreased strength;Decreased endurance;Impaired balance;Decreased mobility;Impaired sensation   Assessment Pt agreeable to participate in PT session. Pt performed functional mobility as outlined above. Pt seen ambulating in hallway with staff and parents multiple times today. Has made great improvements in ambulation and transfers however continues to demonstrate impairments in RUE function and strength. Mother with questions regarding if pt is safe to go home. Explained that PT bases assessment on what is witnessed in hospital and for any pt there is a risk of falls, slips, trips at home after dc. Based on hospital assessment, pt is S level for all mobility with rollator, perform stairs without LOB or weakness and had appropriate endurance to ambulate household distances. +supportive family who states they are always home with pt. If pt decides to go home, recommend OP PT for balance. Spoke with OT who would recommend OP OT. CM aware and awaiting family decision. The patient's AM-PAC Basic Mobility Inpatient Short Form Raw Score is 20. A Raw score of greater than 16 suggests the patient may benefit from discharge to home. Please also refer to the recommendation of the Physical Therapist for safe discharge planning. Pt left seated in chair with chair alarm, call bell, phone, and all personal needs within reach. Pt will continue to benefit from skilled acute care PT to further address their functional mobility limitations.   Barriers to Discharge Decreased caregiver support;Inaccessible home environment   Goals   Patient Goals to improve RUE function   STG Expiration Date 01/30/24   PT Treatment Day 5   Plan   Treatment/Interventions  Functional transfer training;LE strengthening/ROM;Therapeutic exercise;Elevations;Endurance training;Patient/family training;Equipment eval/education;Bed mobility;Gait training;Spoke to nursing;Spoke to case management;OT   Progress Progressing toward goals   PT Frequency 3-5x/wk   Discharge Recommendation   Rehab Resource Intensity Level, PT I (Maximum Resource Intensity)   Equipment Recommended   (rollator)   Additional Comments please see assessment   AM-PAC Basic Mobility Inpatient   Turning in Flat Bed Without Bedrails 4   Lying on Back to Sitting on Edge of Flat Bed Without Bedrails 4   Moving Bed to Chair 3   Standing Up From Chair Using Arms 3   Walk in Room 3   Climb 3-5 Stairs With Railing 3   Basic Mobility Inpatient Raw Score 20   Basic Mobility Standardized Score 43.99   Highest Level Of Mobility   JH-HLM Goal 6: Walk 10 steps or more   JH-HLM Achieved 7: Walk 25 feet or more   Elliot Mclaughlin, PT, DPT

## 2024-01-23 NOTE — HEMODIALYSIS
Patient refused dialysis treatment upon arrival to dialysis unit after asking for an extra treatment the prior day. He stated that his PT and OT sessions are more important at this point. Nephrology NP aware

## 2024-01-23 NOTE — PROGRESS NOTES
NEPHROLOGY PROGRESS NOTE   Mateus Mckeon 38 y.o. male MRN: [id number]  Unit/Bed#: [facility] 718-01 Encounter: [id number]  Reason for Consult: ESRD      SUMMARY:    39 yo man with PMH of ESRD on HD at [facility], [place] p/w headaches.  Nephrology is consulted for management of ESRD      Assessment/Plan:     End Stage Renal Disease  -Modality:In-Center Hemodialysis  -Schedule: Monday/Wednesday/Friday  -Dialysis Unit: [facility]  -Access: Left Arm AV Fistula  -Presciption: Reviewed  -Status: Underwent dialysis yesterday post weight was 93.1 kg ultrafiltrated close to 4 L with stable blood pressure.  -Plan:   We offered an extra treatment of isolated ultrafiltration but he declined this morning.  I had an at length discussion with the patient his mother the  and the inpatient dialysis director.  We listen to all of Paulo's concerns, listen to his questions, we understood his feelings.  I explained to the best my ability everything I could.  I also answered all his questions.  At the end of the conversation I think we were all in the better understanding and agreement of each other.  He feels better with the plan moving forward along with his mother.  If the patient remains inpatient tomorrow we will plan for hemodialysis.  No strong objections to discharge today, if his outpatient HD schedule remains the same Monday Wednesday Friday.  If his HD schedule is going to change to Tuesday Thursday Saturday then I would recommend him getting dialysis tomorrow and then getting the next outpatient dialysis on Thursday  Discussed at length with the  who will discuss with case management in regards to the discharge plan     Anemia  -Avoid HETAL at this time due to history of recent CVA along with uncontrolled hypertension  -Open remains below target but is stable.  Once his blood pressure under better control will need to be reevaluated about HETAL and the risks and benefits and my recommendation would be after  few months target hemoglobin between 9-10 with HETAL as long as the blood pressure is well-controlled.  At this time we will continue to hold HETAL     Mineral bone disorder-chronic kidney disease  -Continue calcium acetate with meals along with Sensipar     Blood pressure/volume status  -Blood pressure historically has been very difficult to control volume status improving and did get to his dry weight yesterday  -Of resistant hypertension  -CVA  -Blood pressure overall improved since admission  -Continued ultrafiltration which will help with blood pressure  -Doing 0.3 mg every 8 hours, hydralazine 100 mg 3 times daily, lisinopril 40 mg daily, labetalol 100 mg 3 times daily nifedipine 90 mg twice a day  -She volume overloaded with improvement of the volume status we may need to challenge his dry weight  -Outpatient dry weight 93 EKG     Hyperkalemia--resolved     CVA  -MRI shows a close Endo multifocal acute and recent infarctions  -Management as per neurology  -Continue blood pressure control and aim for gentle reduction  -Continue aspirin and Lipitor  -Subarachnoid hemorrhage, status post cerebral angiography x 2 earlier this month    Chronic pruritus  -- Patient on Korsuva as an outpatient on HD    Over 75 minutes was spent in discussion with this patient along with counseling.  Spoke to floor managers and HD manager.  Also discussed with the dialysis charge nurse.  I also discussed with the patient himself along with examination review of the labs and chart.      SUBJECTIVE / INTERVAL HISTORY:    Frustrated with his care over the last couple of days.  Discussed his concerns with me at length.    OBJECTIVE:  Current Weight: Weight - Scale: 94.5 kg (208 lb 5.4 oz)  Vitals:    01/23/24 0559 01/23/24 0600 01/23/24 0704 01/23/24 0836   BP: 170/92  170/91 167/94   BP Location:       Pulse: 73  72 75   Resp:       Temp:   98.6 °F (37 °C)    TempSrc:       SpO2: 95%  95% 97%   Weight:  94.5 kg (208 lb 5.4 oz)     Height:            Intake/Output Summary (Last 24 hours) at 1/23/2024 1045  Last data filed at 1/22/2024 1207  Gross per 24 hour   Intake 300 ml   Output 4502 ml   Net -4202 ml       Review of Systems:    Constitutional: Negative for chills and fever.   HENT: Negative for ear pain and sore throat.    Eyes: Negative for pain and visual disturbance.   Respiratory: Negative for cough and shortness of breath.    Cardiovascular: Negative for chest pain and palpitations.   Gastrointestinal: Negative for abdominal pain and vomiting.   Genitourinary: Negative for dysuria and hematuria.   Musculoskeletal: Negative for arthralgias and back pain.   Skin: Negative for color change and rash.   Neurological: Negative for seizures and syncope.   12 point ROS has been reviewed.    Physical Exam  Vitals and nursing note reviewed. Exam conducted with a chaperone present.   Constitutional:       General: He is not in acute distress.     Appearance: He is well-developed.   HENT:      Head: Normocephalic and atraumatic.   Eyes:      General: No scleral icterus.     Conjunctiva/sclera: Conjunctivae normal.      Pupils: Pupils are equal, round, and reactive to light.   Cardiovascular:      Rate and Rhythm: Normal rate and regular rhythm.      Heart sounds: S1 normal and S2 normal. No murmur heard.     No friction rub. No gallop.   Pulmonary:      Effort: Pulmonary effort is normal. No respiratory distress.      Breath sounds: Normal breath sounds. No wheezing or rales.   Abdominal:      General: Bowel sounds are normal.      Palpations: Abdomen is soft.      Tenderness: There is no abdominal tenderness. There is no rebound.   Musculoskeletal:         General: Normal range of motion.      Cervical back: Normal range of motion and neck supple.   Skin:     Findings: No rash.   Neurological:      Mental Status: He is alert and oriented to person, place, and time.      Motor: Weakness present.   Psychiatric:         Behavior: Behavior normal.          Medications:    Current Facility-Administered Medications:     aspirin chewable tablet 81 mg, 81 mg, Oral, Daily, Malu Moore PA-C, 81 mg at 01/23/24 0838    atorvastatin (LIPITOR) tablet 40 mg, 40 mg, Oral, QPM, Radha Steen MD, 40 mg at 01/22/24 1641    bisacodyl (DULCOLAX) rectal suppository 10 mg, 10 mg, Rectal, Daily PRN, Radha Steen MD    calcium acetate (PHOSLO) capsule 667 mg, 667 mg, Oral, TID With Meals, Radha Steen MD, 667 mg at 01/23/24 0916    cinacalcet (SENSIPAR) tablet 60 mg, 60 mg, Oral, Daily, Radha Steen MD, 60 mg at 01/23/24 0837    cloNIDine (CATAPRES) tablet 0.3 mg, 0.3 mg, Oral, Q8H HALIE, Valeria Forrester PA-C, 0.3 mg at 01/23/24 0557    escitalopram (LEXAPRO) tablet 20 mg, 20 mg, Oral, HS, Bryan Claire MD, 20 mg at 01/22/24 2051    famotidine (PEPCID) tablet 40 mg, 40 mg, Oral, Daily, Radha Steen MD, 40 mg at 01/23/24 0838    fentaNYL (SUBLIMAZE) injection 25 mcg, 25 mcg, Intravenous, Q3 min PRN, Elizabeth Martinez CRNA    gabapentin (NEURONTIN) capsule 300 mg, 300 mg, Oral, Daily, Radha Steen MD, 300 mg at 01/22/24 1306    heparin (porcine) subcutaneous injection 5,000 Units, 5,000 Units, Subcutaneous, Q8H HALIE, Bryan Claire MD, 5,000 Units at 01/23/24 0557    hydrALAZINE (APRESOLINE) injection 20 mg, 20 mg, Intravenous, Q4H PRN, Court Edwards MD, 20 mg at 01/21/24 2331    hydrALAZINE (APRESOLINE) tablet 100 mg, 100 mg, Oral, TID, Valeria Forrester PA-C, 100 mg at 01/23/24 0838    insulin lispro (HumaLOG) FOR PUMP REFILLS 300 Units, 300 Units, Subcutaneous Insulin Pump, Daily PRN, Radha Steen MD    labetalol (NORMODYNE) injection 20 mg, 20 mg, Intravenous, Q4H PRN, Valeria Forrester PA-C, 20 mg at 01/17/24 0410    labetalol (NORMODYNE) tablet 800 mg, 800 mg, Oral, TID, Valeria Forrester PA-C, 800 mg at 01/23/24 0837    lisinopril (ZESTRIL) tablet 40 mg, 40 mg, Oral, Daily, Valeria Forrester PA-C, 40 mg at 01/23/24 0838    melatonin tablet 6 mg, 6 mg, Oral, HS PRN, Cinthia  MD Ke    NIFEdipine (PROCARDIA XL) 24 hr tablet 90 mg, 90 mg, Oral, BID, Valeria Forrester PA-C, 90 mg at 01/23/24 0837    nitroGLYcerin (TRIDIL) 50 mg in 250 mL infusion (premix), 5-200 mcg/min, Intravenous, Titrated, Valeria Forrester PA-C, Stopped at 01/19/24 1910    ondansetron (ZOFRAN) injection 4 mg, 4 mg, Intravenous, Q4H PRN, Radha Steen MD, 4 mg at 01/23/24 0004    oxyCODONE (ROXICODONE) split tablet 2.5 mg, 2.5 mg, Oral, Q4H PRN, 2.5 mg at 01/11/24 2001 **OR** oxyCODONE (ROXICODONE) IR tablet 5 mg, 5 mg, Oral, Q4H PRN, Radha Steen MD, 5 mg at 01/18/24 1721    PATIENT MAINTAINED INSULIN PUMP 1 each, 1 each, Subcutaneous, Q8H, Radha Steen MD, 1 each at 01/23/24 0847    polyethylene glycol (MIRALAX) packet 17 g, 17 g, Oral, Daily, Radha Steen MD, 17 g at 01/22/24 1316    prochlorperazine (COMPAZINE) tablet 5 mg, 5 mg, Oral, Q6H PRN, Bryan Claire MD    senna-docusate sodium (SENOKOT S) 8.6-50 mg per tablet 1 tablet, 1 tablet, Oral, BID, Radha Steen MD, 1 tablet at 01/23/24 0837    Laboratory Results:  Results from last 7 days   Lab Units 01/23/24  0608 01/22/24  0835 01/21/24  0848 01/20/24  0604 01/18/24  0558 01/17/24  0621   WBC Thousand/uL 5.84  --  6.16 5.49 7.25 12.49*   HEMOGLOBIN g/dL 7.9*  --  8.6* 8.4* 7.7* 8.1*   HEMATOCRIT % 25.3*  --  26.9* 25.6* 23.9* 24.4*   PLATELETS Thousands/uL 255  --  272 248 242 266   POTASSIUM mmol/L 5.1 6.3* 5.5* 4.7 4.2 4.9   CHLORIDE mmol/L 100 99 100 101 99 98   CO2 mmol/L 27 26 29 29 32 27   BUN mg/dL 35* 49* 38* 27* 22 37*   CREATININE mg/dL 8.12* 10.84* 9.64* 7.42* 7.06* 10.79*   CALCIUM mg/dL 7.8* 8.1* 8.5 7.9* 7.9* 7.9*       PLEASE NOTE:  This encounter was completed utilizing the [employer] Speech Voice Recognition Software. Grammatical errors, random word insertions, pronoun errors and incomplete sentences are occasional consequences of the system due to software limitations, ambient noise and hardware issues.These may be missed by proof reading  prior to affixing electronic signature. Any questions or concerns about the content, text or information contained within the body of this dictation should be directly addressed to the physician for clarification. Please do not hesitate to call me directly if you have any any questions or concerns.

## 2024-01-23 NOTE — TREATMENT PLAN
Patient is a 38-year-old male with a history of type 1 diabetes mellitus who uses the OmniPod 5 and auto mode with Dexcom G6 via his phone carrol.    Basal rate: Midnight to 6 AM 0.5 units/h, 6 AM to midnight 0.65 units/h  ICR 1:14  ISF 1:50  Target glucose 120  Active insulin time 4 hours    Patient has been successfully managing his pump while inpatient.  He is okay to manage this pump independently in rehab as well.

## 2024-01-23 NOTE — PLAN OF CARE
Problem: PHYSICAL THERAPY ADULT  Goal: Performs mobility at highest level of function for planned discharge setting.  See evaluation for individualized goals.  Description: Treatment/Interventions: Functional transfer training, LE strengthening/ROM, Therapeutic exercise, Endurance training, Elevations, Cognitive reorientation, Patient/family training, Equipment eval/education, Bed mobility, Gait training, Spoke to nursing, Spoke to case management, OT, Family          See flowsheet documentation for full assessment, interventions and recommendations.  Outcome: Progressing  Note: Prognosis: Good  Problem List: Decreased strength, Decreased endurance, Impaired balance, Decreased mobility, Impaired sensation  Assessment: Pt agreeable to participate in PT session. Pt performed functional mobility as outlined above. Pt seen ambulating in hallway with staff and parents multiple times today. Has made great improvements in ambulation and transfers however continues to demonstrate impairments in RUE function and strength. Mother with questions regarding if pt is safe to go home. Explained that PT bases assessment on what is witnessed in hospital and for any pt there is a risk of falls, slips, trips at home after dc. Based on hospital assessment, pt is S level for all mobility with rollator, perform stairs without LOB or weakness and had appropriate endurance to ambulate household distances. +supportive family who states they are always home with pt. If pt decides to go home, recommend OP PT for balance. Spoke with OT who would recommend OP OT. CM aware and awaiting family decision. The patient's AM-PAC Basic Mobility Inpatient Short Form Raw Score is 20. A Raw score of greater than 16 suggests the patient may benefit from discharge to home. Please also refer to the recommendation of the Physical Therapist for safe discharge planning. Pt left seated in chair with chair alarm, call bell, phone, and all personal needs within  reach. Pt will continue to benefit from skilled acute care PT to further address their functional mobility limitations.  Barriers to Discharge: Decreased caregiver support, Inaccessible home environment     Rehab Resource Intensity Level, PT: I (Maximum Resource Intensity)    See flowsheet documentation for full assessment.

## 2024-01-24 ENCOUNTER — TRANSITIONAL CARE MANAGEMENT (OUTPATIENT)
Dept: INTERNAL MEDICINE CLINIC | Facility: CLINIC | Age: 39
End: 2024-01-24

## 2024-01-24 DIAGNOSIS — F33.1 MODERATE EPISODE OF RECURRENT MAJOR DEPRESSIVE DISORDER (HCC): ICD-10-CM

## 2024-01-24 LAB — GLUCOSE SERPL-MCNC: 192 MG/DL (ref 65–140)

## 2024-01-24 RX ORDER — ESCITALOPRAM OXALATE 20 MG/1
20 TABLET ORAL DAILY
Qty: 90 TABLET | Refills: 2 | Status: ON HOLD | OUTPATIENT
Start: 2024-01-24

## 2024-01-24 NOTE — ASSESSMENT & PLAN NOTE
Lab Results   Component Value Date    HGBA1C 6.0 (H) 01/14/2024       Recent Labs     01/21/24  2300 01/22/24  0551 01/22/24  2344 01/23/24  1116   POCGLU 182* 152* 170* 192*         Blood Sugar Average: Last 72 hrs:  (P) [id number]    Blood glucose very well controlled with patient managing his own pump  Continue using pump  Endocrinology follow-up

## 2024-01-24 NOTE — ASSESSMENT & PLAN NOTE
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 Mittal, 1/5/2024, 1/12/2024)4.   On ASA for previous strokes reversed with DDAVP.  MRIs negative for source of SAH, small scattered acute/subacute infarcts noted.   CT head wo 1/9/24: Improving subarachnoid hemorrhage compared to recent prior studies. No new findings.   ASA 81 mg daily resumed and Nimodipine discontinued per NSX recs 1/12.  No further need for SAH pathway per NSx.  Neurosurgery follow-up outpatient  Patient discharged with home PT/OT.

## 2024-01-24 NOTE — ASSESSMENT & PLAN NOTE
Continue weekly clonidine patch, hydralazine 100 mg 3 times daily, labetalol 800 mg 3 times daily, lisinopril 40 mg daily and Procardia 90 mg p.o. twice daily (changed while inpatient from 60mg am/90mg pm)  Also managed with volume removal with HD

## 2024-01-24 NOTE — TELEPHONE ENCOUNTER
Medication Refill Request     Name of Medication Escitalopram  Dose/Frequency 20 mg/ Take 1 tablet by mouth daily.  Quantity 90  Verified pharmacy   [x]  Verified ordering Provider   [x]  Does patient have enough for the next 3 days? Yes [] No [x]  Does patient have a follow-up appointment scheduled? Yes [x] No []   If so when is appointment: 2/22/2024

## 2024-01-24 NOTE — ASSESSMENT & PLAN NOTE
"Patient developed RUE weakness with mild facial droop on 1/12  Hx of prior cerebellar and pontine strokes  MRI brain \"Crescendo multifocal acute/recent infarctions involving at least 3 discrete vascular territories (left posterior cerebral artery and bilateral middle cerebral artery territories), worrisome for multifocal new/interval embolic infarctions\"  Continue ASA 81 mg daily and atorvastatin 40mg daily.   Echo 1/5 with no PFO  Cleared for neurology standpoint   Patient will need to Follow up with Neurosurgery in 6 weeks for MRI  PT/OT/PMR recommending rehab at discharge.  Unfortunately some facilities would not allow patient to manage his own insulin with pump.  He had an accepting facility but ultimately patient and family decided to go home with PT/OT  "

## 2024-01-25 ENCOUNTER — HOSPITAL ENCOUNTER (INPATIENT)
Facility: HOSPITAL | Age: 39
LOS: 1 days | Discharge: HOME | DRG: 391 | End: 2024-01-27
Attending: EMERGENCY MEDICINE | Admitting: INTERNAL MEDICINE
Payer: MEDICARE

## 2024-01-25 ENCOUNTER — APPOINTMENT (EMERGENCY)
Dept: CT IMAGING | Facility: HOSPITAL | Age: 39
DRG: 391 | End: 2024-01-25
Payer: MEDICARE

## 2024-01-25 DIAGNOSIS — I10 ESSENTIAL (PRIMARY) HYPERTENSION: ICD-10-CM

## 2024-01-25 DIAGNOSIS — R10.9 ABDOMINAL PAIN: Primary | ICD-10-CM

## 2024-01-25 DIAGNOSIS — R11.14 BILIOUS VOMITING WITH NAUSEA: ICD-10-CM

## 2024-01-25 DIAGNOSIS — K31.84 GASTROPARESIS: ICD-10-CM

## 2024-01-25 DIAGNOSIS — I16.0 HYPERTENSIVE URGENCY: ICD-10-CM

## 2024-01-25 DIAGNOSIS — E10.9 TYPE 1 DIABETES MELLITUS ON INSULIN THERAPY (HCC): ICD-10-CM

## 2024-01-25 PROBLEM — R10.13 ABDOMINAL PAIN, ACUTE, EPIGASTRIC: Status: ACTIVE | Noted: 2024-01-25

## 2024-01-25 LAB
ALBUMIN SERPL BCP-MCNC: 3.8 G/DL (ref 3.5–5)
ALP SERPL-CCNC: 72 U/L (ref 34–104)
ALT SERPL W P-5'-P-CCNC: 22 U/L (ref 7–52)
ANION GAP SERPL CALCULATED.3IONS-SCNC: 10 MMOL/L
AST SERPL W P-5'-P-CCNC: 28 U/L (ref 13–39)
BASOPHILS # BLD AUTO: 0.06 THOUSANDS/ÂΜL (ref 0–0.1)
BASOPHILS NFR BLD AUTO: 1 % (ref 0–1)
BILIRUB SERPL-MCNC: 0.59 MG/DL (ref 0.2–1)
BUN SERPL-MCNC: 27 MG/DL (ref 5–25)
CALCIUM SERPL-MCNC: 9 MG/DL (ref 8.4–10.2)
CHLORIDE SERPL-SCNC: 95 MMOL/L (ref 96–108)
CO2 SERPL-SCNC: 35 MMOL/L (ref 21–32)
CREAT SERPL-MCNC: 8.01 MG/DL (ref 0.6–1.3)
EOSINOPHIL # BLD AUTO: 0.23 THOUSAND/ÂΜL (ref 0–0.61)
EOSINOPHIL NFR BLD AUTO: 4 % (ref 0–6)
ERYTHROCYTE [DISTWIDTH] IN BLOOD BY AUTOMATED COUNT: 14.4 % (ref 11.6–15.1)
GFR SERPL CREATININE-BSD FRML MDRD: 7 ML/MIN/1.73SQ M
GLUCOSE SERPL-MCNC: 161 MG/DL (ref 65–140)
GLUCOSE SERPL-MCNC: 163 MG/DL (ref 65–140)
HCT VFR BLD AUTO: 25.9 % (ref 36.5–49.3)
HGB BLD-MCNC: 8.4 G/DL (ref 12–17)
IMM GRANULOCYTES # BLD AUTO: 0.02 THOUSAND/UL (ref 0–0.2)
IMM GRANULOCYTES NFR BLD AUTO: 0 % (ref 0–2)
LACTATE SERPL-SCNC: 0.7 MMOL/L (ref 0.5–2)
LIPASE SERPL-CCNC: 10 U/L (ref 11–82)
LYMPHOCYTES # BLD AUTO: 1.02 THOUSANDS/ÂΜL (ref 0.6–4.47)
LYMPHOCYTES NFR BLD AUTO: 16 % (ref 14–44)
MCH RBC QN AUTO: 31.1 PG (ref 26.8–34.3)
MCHC RBC AUTO-ENTMCNC: 32.4 G/DL (ref 31.4–37.4)
MCV RBC AUTO: 96 FL (ref 82–98)
MONOCYTES # BLD AUTO: 0.47 THOUSAND/ÂΜL (ref 0.17–1.22)
MONOCYTES NFR BLD AUTO: 7 % (ref 4–12)
NEUTROPHILS # BLD AUTO: 4.58 THOUSANDS/ÂΜL (ref 1.85–7.62)
NEUTS SEG NFR BLD AUTO: 72 % (ref 43–75)
NRBC BLD AUTO-RTO: 0 /100 WBCS
PLATELET # BLD AUTO: 234 THOUSANDS/UL (ref 149–390)
PMV BLD AUTO: 9.8 FL (ref 8.9–12.7)
POTASSIUM SERPL-SCNC: 4.6 MMOL/L (ref 3.5–5.3)
PROT SERPL-MCNC: 5.9 G/DL (ref 6.4–8.4)
RBC # BLD AUTO: 2.7 MILLION/UL (ref 3.88–5.62)
SODIUM SERPL-SCNC: 140 MMOL/L (ref 135–147)
WBC # BLD AUTO: 6.38 THOUSAND/UL (ref 4.31–10.16)

## 2024-01-25 PROCEDURE — 36415 COLL VENOUS BLD VENIPUNCTURE: CPT

## 2024-01-25 PROCEDURE — G1004 CDSM NDSC: HCPCS

## 2024-01-25 PROCEDURE — 99285 EMERGENCY DEPT VISIT HI MDM: CPT | Performed by: EMERGENCY MEDICINE

## 2024-01-25 PROCEDURE — 96374 THER/PROPH/DIAG INJ IV PUSH: CPT

## 2024-01-25 PROCEDURE — 93005 ELECTROCARDIOGRAM TRACING: CPT

## 2024-01-25 PROCEDURE — 99284 EMERGENCY DEPT VISIT MOD MDM: CPT

## 2024-01-25 PROCEDURE — 82948 REAGENT STRIP/BLOOD GLUCOSE: CPT

## 2024-01-25 PROCEDURE — 80053 COMPREHEN METABOLIC PANEL: CPT

## 2024-01-25 PROCEDURE — 83605 ASSAY OF LACTIC ACID: CPT

## 2024-01-25 PROCEDURE — 99284 EMERGENCY DEPT VISIT MOD MDM: CPT | Performed by: INTERNAL MEDICINE

## 2024-01-25 PROCEDURE — 74177 CT ABD & PELVIS W/CONTRAST: CPT

## 2024-01-25 PROCEDURE — 96376 TX/PRO/DX INJ SAME DRUG ADON: CPT

## 2024-01-25 PROCEDURE — 83690 ASSAY OF LIPASE: CPT

## 2024-01-25 PROCEDURE — 85025 COMPLETE CBC W/AUTO DIFF WBC: CPT

## 2024-01-25 PROCEDURE — 96375 TX/PRO/DX INJ NEW DRUG ADDON: CPT

## 2024-01-25 RX ORDER — PANTOPRAZOLE SODIUM 40 MG/10ML
40 INJECTION, POWDER, LYOPHILIZED, FOR SOLUTION INTRAVENOUS
Status: DISCONTINUED | OUTPATIENT
Start: 2024-01-26 | End: 2024-01-27 | Stop reason: HOSPADM

## 2024-01-25 RX ORDER — ESCITALOPRAM OXALATE 20 MG/1
20 TABLET ORAL DAILY
Status: DISCONTINUED | OUTPATIENT
Start: 2024-01-26 | End: 2024-01-27 | Stop reason: HOSPADM

## 2024-01-25 RX ORDER — ONDANSETRON 2 MG/ML
4 INJECTION INTRAMUSCULAR; INTRAVENOUS ONCE
Status: COMPLETED | OUTPATIENT
Start: 2024-01-25 | End: 2024-01-25

## 2024-01-25 RX ORDER — LABETALOL HYDROCHLORIDE 5 MG/ML
10 INJECTION, SOLUTION INTRAVENOUS ONCE
Status: COMPLETED | OUTPATIENT
Start: 2024-01-25 | End: 2024-01-25

## 2024-01-25 RX ORDER — FAMOTIDINE 20 MG/1
10 TABLET, FILM COATED ORAL EVERY MORNING
Status: DISCONTINUED | OUTPATIENT
Start: 2024-01-26 | End: 2024-01-27 | Stop reason: HOSPADM

## 2024-01-25 RX ORDER — CLONIDINE HYDROCHLORIDE 0.1 MG/1
0.3 TABLET ORAL EVERY 8 HOURS SCHEDULED
Status: DISCONTINUED | OUTPATIENT
Start: 2024-01-25 | End: 2024-01-27 | Stop reason: HOSPADM

## 2024-01-25 RX ORDER — GABAPENTIN 100 MG/1
200 CAPSULE ORAL
Status: DISCONTINUED | OUTPATIENT
Start: 2024-01-25 | End: 2024-01-27 | Stop reason: HOSPADM

## 2024-01-25 RX ORDER — HYDROMORPHONE HCL/PF 1 MG/ML
0.5 SYRINGE (ML) INJECTION
Status: DISCONTINUED | OUTPATIENT
Start: 2024-01-25 | End: 2024-01-27 | Stop reason: HOSPADM

## 2024-01-25 RX ORDER — HYDROXYZINE HYDROCHLORIDE 10 MG/1
10 TABLET, FILM COATED ORAL EVERY 6 HOURS PRN
Status: DISCONTINUED | OUTPATIENT
Start: 2024-01-25 | End: 2024-01-27 | Stop reason: HOSPADM

## 2024-01-25 RX ORDER — HYDRALAZINE HYDROCHLORIDE 25 MG/1
100 TABLET, FILM COATED ORAL 3 TIMES DAILY
Status: DISCONTINUED | OUTPATIENT
Start: 2024-01-25 | End: 2024-01-27 | Stop reason: HOSPADM

## 2024-01-25 RX ORDER — NIFEDIPINE 30 MG/1
90 TABLET, EXTENDED RELEASE ORAL 2 TIMES DAILY
Status: DISCONTINUED | OUTPATIENT
Start: 2024-01-25 | End: 2024-01-27 | Stop reason: HOSPADM

## 2024-01-25 RX ORDER — LISINOPRIL 20 MG/1
40 TABLET ORAL DAILY
Status: DISCONTINUED | OUTPATIENT
Start: 2024-01-26 | End: 2024-01-27 | Stop reason: HOSPADM

## 2024-01-25 RX ORDER — CINACALCET 30 MG/1
60 TABLET, FILM COATED ORAL DAILY
Status: DISCONTINUED | OUTPATIENT
Start: 2024-01-26 | End: 2024-01-27 | Stop reason: HOSPADM

## 2024-01-25 RX ORDER — LABETALOL HYDROCHLORIDE 5 MG/ML
10 INJECTION, SOLUTION INTRAVENOUS EVERY 6 HOURS PRN
Status: DISCONTINUED | OUTPATIENT
Start: 2024-01-25 | End: 2024-01-27 | Stop reason: HOSPADM

## 2024-01-25 RX ORDER — TRAZODONE HYDROCHLORIDE 50 MG/1
25 TABLET ORAL
Status: DISCONTINUED | OUTPATIENT
Start: 2024-01-25 | End: 2024-01-27 | Stop reason: HOSPADM

## 2024-01-25 RX ORDER — CALCIUM CARBONATE 500 MG/1
1000 TABLET, CHEWABLE ORAL DAILY PRN
Status: DISCONTINUED | OUTPATIENT
Start: 2024-01-25 | End: 2024-01-27 | Stop reason: HOSPADM

## 2024-01-25 RX ORDER — HYDROMORPHONE HCL/PF 1 MG/ML
0.5 SYRINGE (ML) INJECTION ONCE
Status: COMPLETED | OUTPATIENT
Start: 2024-01-25 | End: 2024-01-25

## 2024-01-25 RX ORDER — ONDANSETRON 4 MG/1
4 TABLET, ORALLY DISINTEGRATING ORAL EVERY 6 HOURS PRN
Status: DISCONTINUED | OUTPATIENT
Start: 2024-01-25 | End: 2024-01-26

## 2024-01-25 RX ORDER — OXYCODONE HYDROCHLORIDE 5 MG/1
5 TABLET ORAL EVERY 4 HOURS PRN
Status: DISCONTINUED | OUTPATIENT
Start: 2024-01-25 | End: 2024-01-27 | Stop reason: HOSPADM

## 2024-01-25 RX ORDER — INSULIN LISPRO 100 [IU]/ML
1-6 INJECTION, SOLUTION INTRAVENOUS; SUBCUTANEOUS
Status: DISCONTINUED | OUTPATIENT
Start: 2024-01-26 | End: 2024-01-26

## 2024-01-25 RX ORDER — INSULIN LISPRO 100 [IU]/ML
1-6 INJECTION, SOLUTION INTRAVENOUS; SUBCUTANEOUS
Status: DISCONTINUED | OUTPATIENT
Start: 2024-01-25 | End: 2024-01-26

## 2024-01-25 RX ORDER — CALCIUM ACETATE 667 MG/1
667 CAPSULE ORAL 3 TIMES DAILY
Status: DISCONTINUED | OUTPATIENT
Start: 2024-01-25 | End: 2024-01-26

## 2024-01-25 RX ORDER — ATORVASTATIN CALCIUM 40 MG/1
40 TABLET, FILM COATED ORAL EVERY EVENING
Status: DISCONTINUED | OUTPATIENT
Start: 2024-01-25 | End: 2024-01-27 | Stop reason: HOSPADM

## 2024-01-25 RX ORDER — LABETALOL 100 MG/1
100 TABLET, FILM COATED ORAL 3 TIMES DAILY
Status: DISCONTINUED | OUTPATIENT
Start: 2024-01-25 | End: 2024-01-26

## 2024-01-25 RX ADMIN — GABAPENTIN 200 MG: 100 CAPSULE ORAL at 23:54

## 2024-01-25 RX ADMIN — NIFEDIPINE 90 MG: 30 TABLET, FILM COATED, EXTENDED RELEASE ORAL at 23:56

## 2024-01-25 RX ADMIN — LABETALOL HYDROCHLORIDE 100 MG: 100 TABLET, FILM COATED ORAL at 23:53

## 2024-01-25 RX ADMIN — IOHEXOL 80 ML: 350 INJECTION, SOLUTION INTRAVENOUS at 17:48

## 2024-01-25 RX ADMIN — ONDANSETRON 4 MG: 2 INJECTION INTRAMUSCULAR; INTRAVENOUS at 17:07

## 2024-01-25 RX ADMIN — LABETALOL HYDROCHLORIDE 10 MG: 5 INJECTION, SOLUTION INTRAVENOUS at 19:03

## 2024-01-25 RX ADMIN — ATORVASTATIN CALCIUM 40 MG: 40 TABLET, FILM COATED ORAL at 23:53

## 2024-01-25 RX ADMIN — HYDRALAZINE HYDROCHLORIDE 100 MG: 25 TABLET ORAL at 23:52

## 2024-01-25 RX ADMIN — CLONIDINE HYDROCHLORIDE 0.3 MG: 0.1 TABLET ORAL at 23:51

## 2024-01-25 RX ADMIN — LABETALOL HYDROCHLORIDE 10 MG: 5 INJECTION, SOLUTION INTRAVENOUS at 19:47

## 2024-01-25 RX ADMIN — Medication 10 MG: at 21:26

## 2024-01-25 RX ADMIN — HYDROMORPHONE HYDROCHLORIDE 0.5 MG: 1 INJECTION, SOLUTION INTRAMUSCULAR; INTRAVENOUS; SUBCUTANEOUS at 17:09

## 2024-01-25 NOTE — ED PROVIDER NOTES
History  Chief Complaint   Patient presents with    Abdominal Pain     Patient reports generalized abd pain and vomiting that started yesterday     HPI    38-year-old male with history of T1DM, CVA, SAH, ESRD on HD MWF,  presents for evaluation of abdominal pain and vomiting.  Symptoms started yesterday after dialysis, with with nausea, abdominal pain and nonbilious vomiting.  Abdominal pain is squeezing in quality, waxes and wanes, nonradiating, worsens with movement and laying down.  He has been unable to tolerate p.o. intake, except for his HTN meds, and has had 8-10 episodes of emesis since onset yesterday.  Reports similar episode during most recent admission, when he was found to have subarachnoid hemorrhage and CVA.  He reports that episode started after too much time and taking off during a dialysis session.  Denies diarrhea, chest pain, SOB, bloody emesis, headache, new neurological deficits.    Prior to Admission Medications   Prescriptions Last Dose Informant Patient Reported? Taking?   B-D ULTRAFINE III SHORT PEN 31G X 8 MM MISC  Self No No   Sig: USE 1 PEN NEEDLE 8 TIMES DAILY   GLUCAGON EMERGENCY 1 MG injection  Self Yes No   Gvoke HypoPen 1-Pack 1 MG/0.2ML SOAJ  Self Yes No   Insulin Disposable Pump (Omnipod 5 G6 Intro, Gen 5,) KIT  Self Yes No   Insulin Disposable Pump (Omnipod 5 G6 Pod, Gen 5,) MISC  Self Yes No   NIFEdipine (PROCARDIA XL) 90 mg 24 hr tablet   No No   Sig: Take 1 tablet (90 mg total) by mouth 2 (two) times a day   NovoLOG 100 UNIT/ML injection  Self Yes No   Patiromer Sorbitex Calcium (VELTASSA PO)  Self Yes No   Sig: Take 5 g by mouth once a week   SPS 15 GM/60ML suspension  Self Yes No   Sig: TAKE 60 ML BY MOUTH SINGLE DOSE FOR EMERGENCY USE DURING MISSED HEMODIALYSIS   aspirin (ECOTRIN LOW STRENGTH) 81 mg EC tablet  Self Yes No   Sig: Take 81 mg by mouth daily   atorvastatin (LIPITOR) 40 mg tablet   No No   Sig: Take 1 tablet (40 mg total) by mouth every evening   b complex  vitamins capsule  Self Yes No   Sig: Take 1 capsule by mouth daily before lunch    calcium acetate (PHOSLO) capsule   No No   Sig: Take 1 capsule (667 mg total) by mouth 3 (three) times a day   cinacalcet (SENSIPAR) 60 MG tablet   No No   Sig: Take 1 tablet (60 mg total) by mouth daily   cloNIDine (CATAPRES-TTS-3) 0.3 mg/24 hr  Self Yes No   Si patch once a week   escitalopram (LEXAPRO) 20 mg tablet   No No   Sig: Take 1 tablet (20 mg total) by mouth daily   famotidine (PEPCID) 40 MG tablet   No No   Sig: TAKE 1 TABLET BY MOUTH IN THE MORNING   gabapentin (NEURONTIN) 100 mg capsule   No No   Sig: TAKE 2 CAPSULES BY MOUTH AT BEDTIME   hydrALAZINE (APRESOLINE) 100 MG tablet   No No   Sig: Take 1 tablet (100 mg total) by mouth 3 (three) times a day   hydrOXYzine HCL (ATARAX) 10 mg tablet  Self No No   Sig: Take 1 tablet (10 mg total) by mouth every 6 (six) hours as needed for itching or anxiety   labetalol (NORMODYNE) 200 mg tablet   No No   Sig: Take 4 tablets (800 mg total) by mouth 3 (three) times a day   lisinopril (ZESTRIL) 40 mg tablet   No No   Sig: Take 1 tablet (40 mg total) by mouth daily   ondansetron (ZOFRAN) 4 mg tablet  Self No No   Sig: Take 1 tablet (4 mg total) by mouth every 8 (eight) hours as needed for nausea or vomiting   saccharomyces boulardii (FLORASTOR) 250 mg capsule  Self Yes No   Sig: Take 250 mg by mouth daily   traZODone (DESYREL) 50 mg tablet  Self No No   Sig: Take 0.5 tablets (25 mg total) by mouth daily at bedtime as needed for sleep   triamcinolone (KENALOG) 0.1 % ointment  Self No No   Sig: Apply topically 2 (two) times a day For up to 14 days on the itchy areas. Avoid groin, underarms and face. It is important to take at least 1 week break between uses.      Facility-Administered Medications: None       Past Medical History:   Diagnosis Date    Acute kidney injury (HCC)     Ambulates with cane     Anuria     Anxiety     Cellulitis of right elbow 2021    Chronic kidney  disease     Depression     Diabetes mellitus (HCC)     Diarrhea     Emesis 10/24/2020    End stage renal disease (HCC) 02/11/2018    Formatting of this note might be different from the original. Last Assessment & Plan:  Secondary to DM.  On nightly PD.  Followed by Nephro.  Patient considering transplant for kidney and pancreas through [facility] Formatting of this note might be different from the original. Last Assessment & Plan:  Formatting of this note might be different from the original. 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    Esophagitis 07/21/2015    Falls     Gastroparesis     GERD (gastroesophageal reflux disease)     History of shingles 2010    History of transfusion 02/2018    no adverse reaction    Hyperlipidemia     Hyperphosphatemia     Hypertension     Hypoglycemia 07/15/2022    Itching     Mastoiditis of right side 07/15/2022    Muscle weakness     general unsteadiness    Obesity (BMI 30.0-34.9) 09/09/2019    Orthostatic hypotension 10/25/2020    Peripheral polyneuropathy 11/20/2019    PONV (postoperative nausea and vomiting) 01/26/2018    Protein-calorie malnutrition (HCC) 11/23/2020    Recurrent peritonitis (HCC) due to peritoneal dialysis catheter 07/31/2020    Retinopathy     Seizures (HCC)     early 2020 - one time    Skin abnormality     some dime size areas where skin was scratched from itching    Spontaneous bacterial peritonitis (HCC) 10/19/2020    Squamous cell skin cancer     left temple    Stroke (HCC)     x2 - off balance/no driving/fatigue    Swelling of both lower extremities     Traumatic onycholysis 07/21/2022    Vomiting     Wears glasses        Past Surgical History:   Procedure Laterality Date    CARDIAC ELECTROPHYSIOLOGY PROCEDURE N/A 9/21/2023    Procedure: Cardiac loop recorder explant;  Surgeon: Parish Morgan MD;  Location: [facility];  Service: Cardiology    CARDIAC LOOP RECORDER  05/2018    COLONOSCOPY      EGD      EYE  SURGERY Right     IR AV FISTULAGRAM/GRAFTOGRAM  02/23/2021    IR CEREBRAL ANGIOGRAPHY  1/12/2024    IR CEREBRAL ANGIOGRAPHY / INTERVENTION  1/5/2024    IR TUNNELED CENTRAL LINE PLACEMENT  02/16/2021    IR TUNNELED DIALYSIS CATHETER PLACEMENT  11/18/2020    IR TUNNELED DIALYSIS CATHETER REMOVAL  02/12/2021    IR TUNNELED DIALYSIS CATHETER REMOVAL  03/11/2021    MOHS SURGERY Left 12/14/2022    Left temple with Dr. Hassan    PERITONEAL CATHETER INSERTION N/A 08/27/2018    Procedure: UNROOF PD CATHETER;  Surgeon: Felipe Lindo DO;  Location: [facility];  Service: General    AZ ARTERIOVENOUS ANASTOMOSIS OPEN DIRECT Left 11/09/2020    Procedure: CREATION FISTULA  ARTERIOVENOUS (AV) - LEFT WRIST;  Surgeon: Placido Altamirano MD;  Location: [facility];  Service: Vascular    AZ ESOPHAGOGASTRODUODENOSCOPY TRANSORAL DIAGNOSTIC N/A 04/18/2019    Procedure: ESOPHAGOGASTRODUODENOSCOPY (EGD);  Surgeon: Ale Figueroa MD;  Location: [facility];  Service: Gastroenterology    AZ LAPS INSERTION TUNNELED INTRAPERITONEAL CATHETER N/A 08/06/2018    Procedure: LAPAROSCOPIC PD CATHETER PLACEMENT;  Surgeon: Felipe Lindo DO;  Location: [facility];  Service: General    AZ REMOVAL TUNNELED INTRAPERITONEAL CATHETER N/A 11/18/2020    Procedure: REMOVAL CATHETER PERITONEAL DIALYSIS;  Surgeon: Abdifatah Ty MD;  Location[facility];  Service: General    TONSILLECTOMY      UPPER GASTROINTESTINAL ENDOSCOPY         Family History   Problem Relation Age of Onset    Breast cancer Mother     Hypertension Mother     Hyperlipidemia Father     Hypertension Father     Leukemia Maternal Grandmother     Hyperlipidemia Maternal Grandfather     Hypertension Maternal Grandfather     Hyperlipidemia Paternal Grandmother     Hypertension Paternal Grandmother     Heart disease Paternal Grandfather         cardiac disorder    Diabetes Paternal Grandfather      I have reviewed and agree with the history as documented.    E-Cigarette/Vaping    E-Cigarette Use Current  Every Day User     Comments medical marijuana      E-Cigarette/Vaping Substances    Nicotine No     THC Yes     CBD Yes     Flavoring No     Other No     Unknown No      Social History     Tobacco Use    Smoking status: Former     Current packs/day: 0.00     Average packs/day: 0.5 packs/day for 12.0 years (6.0 ttl pk-yrs)     Types: Cigarettes     Start date: 2006     Quit date: 2018     Years since quittin.9    Smokeless tobacco: Never    Tobacco comments:     quit 2018   Vaping Use    Vaping status: Every Day    Substances: THC, CBD   Substance Use Topics    Alcohol use: Not Currently    Drug use: Yes     Types: Marijuana     Comment: medical marijuana        Review of Systems   Constitutional:  Positive for fatigue. Negative for chills and fever.   HENT: Negative.     Respiratory:  Negative for shortness of breath.    Cardiovascular:  Negative for chest pain and palpitations.   Gastrointestinal:  Positive for abdominal pain, nausea and vomiting. Negative for abdominal distention and diarrhea.   Endocrine: Negative.    Musculoskeletal: Negative.    Skin: Negative.    Neurological:  Negative for dizziness and headaches.   All other systems reviewed and are negative.      Physical Exam  ED Triage Vitals   Temperature Pulse Respirations Blood Pressure SpO2   24 1609 24 1609 24 1609 24 1609 24 1609   97.9 °F (36.6 °C) 80 18 160/84 100 %      Temp Source Heart Rate Source Patient Position - Orthostatic VS BP Location FiO2 (%)   24 1609 24 1609 24 1730 24 1609 --   Oral Monitor Lying Right arm       Pain Score       24 1708       7             Orthostatic Vital Signs  Vitals:    24 1900 24 1930 24 1945 24   BP: (!) 217/97 (!) 206/95 (!) 205/83 (!) 206/90   Pulse: 78 77 76 77   Patient Position - Orthostatic VS:  Lying  Lying       Physical Exam  Vitals and nursing note reviewed.   Constitutional:       General: He is in  acute distress.      Appearance: He is well-developed.   HENT:      Head: Normocephalic and atraumatic.   Eyes:      Extraocular Movements: Extraocular movements intact.      Conjunctiva/sclera: Conjunctivae normal.      Pupils: Pupils are equal, round, and reactive to light.   Cardiovascular:      Rate and Rhythm: Normal rate and regular rhythm.      Heart sounds: No murmur heard.  Pulmonary:      Effort: Pulmonary effort is normal. No respiratory distress.      Breath sounds: Normal breath sounds.   Abdominal:      General: Abdomen is flat.      Palpations: Abdomen is soft.      Tenderness: There is generalized abdominal tenderness. There is guarding.      Hernia: No hernia is present.   Musculoskeletal:         General: No swelling.      Cervical back: Neck supple.   Skin:     General: Skin is warm and dry.      Capillary Refill: Capillary refill takes less than 2 seconds.   Neurological:      Mental Status: He is alert and oriented to person, place, and time.      Comments: Neuro deficits at baseline from prior CVA. Weakness noted in R arm.    Psychiatric:         Mood and Affect: Mood normal.         ED Medications  Medications   labetalol (NORMODYNE) injection 10 mg (has no administration in time range)   ondansetron (ZOFRAN) injection 4 mg (4 mg Intravenous Given 1/25/24 1707)   HYDROmorphone (DILAUDID) injection 0.5 mg (0.5 mg Intravenous Given 1/25/24 1709)   iohexol (OMNIPAQUE) 350 MG/ML injection (SINGLE-DOSE) 80 mL (80 mL Intravenous Given 1/25/24 1748)   labetalol (NORMODYNE) injection 10 mg (10 mg Intravenous Given 1/25/24 1903)   labetalol (NORMODYNE) injection 10 mg (10 mg Intravenous Given 1/25/24 1947)       Diagnostic Studies  Results Reviewed       Procedure Component Value Units Date/Time    Comprehensive metabolic panel [[id number]]  (Abnormal) Collected: 01/25/24 1706    Lab Status: Final result Specimen: Blood from Arm, Right Updated: 01/25/24 1733     Sodium 140 mmol/L      Potassium 4.6  mmol/L      Chloride 95 mmol/L      CO2 35 mmol/L      ANION GAP 10 mmol/L      BUN 27 mg/dL      Creatinine 8.01 mg/dL      Glucose 163 mg/dL      Calcium 9.0 mg/dL      AST 28 U/L      ALT 22 U/L      Alkaline Phosphatase 72 U/L      Total Protein 5.9 g/dL      Albumin 3.8 g/dL      Total Bilirubin 0.59 mg/dL      eGFR 7 ml/min/1.73sq m     Narrative:      National Kidney Disease Foundation guidelines for Chronic Kidney Disease (CKD):     Stage 1 with normal or high GFR (GFR > 90 mL/min/1.73 square meters)    Stage 2 Mild CKD (GFR = 60-89 mL/min/1.73 square meters)    Stage 3A Moderate CKD (GFR = 45-59 mL/min/1.73 square meters)    Stage 3B Moderate CKD (GFR = 30-44 mL/min/1.73 square meters)    Stage 4 Severe CKD (GFR = 15-29 mL/min/1.73 square meters)    Stage 5 End Stage CKD (GFR <15 mL/min/1.73 square meters)  Note: GFR calculation is accurate only with a steady state creatinine    Lipase [[id number]]  (Abnormal) Collected: 01/25/24 1706    Lab Status: Final result Specimen: Blood from Arm, Right Updated: 01/25/24 1733     Lipase 10 u/L     Lactic acid, plasma (w/reflex if result > 2.0) [[id number]]  (Normal) Collected: 01/25/24 1706    Lab Status: Final result Specimen: Blood from Arm, Right Updated: 01/25/24 1732     LACTIC ACID 0.7 mmol/L     Narrative:      Result may be elevated if tourniquet was used during collection.    CBC and differential [[id number]]  (Abnormal) Collected: 01/25/24 1706    Lab Status: Final result Specimen: Blood from Arm, Right Updated: 01/25/24 1718     WBC 6.38 Thousand/uL      RBC 2.70 Million/uL      Hemoglobin 8.4 g/dL      Hematocrit 25.9 %      MCV 96 fL      MCH 31.1 pg      MCHC 32.4 g/dL      RDW 14.4 %      MPV 9.8 fL      Platelets 234 Thousands/uL      nRBC 0 /100 WBCs      Neutrophils Relative 72 %      Immat GRANS % 0 %      Lymphocytes Relative 16 %      Monocytes Relative 7 %      Eosinophils Relative 4 %      Basophils Relative 1 %      Neutrophils Absolute 4.58  Thousands/µL      Immature Grans Absolute 0.02 Thousand/uL      Lymphocytes Absolute 1.02 Thousands/µL      Monocytes Absolute 0.47 Thousand/µL      Eosinophils Absolute 0.23 Thousand/µL      Basophils Absolute 0.06 Thousands/µL     Fingerstick Glucose (POCT) [[id number]]  (Abnormal) Collected: 01/25/24 1657    Lab Status: Final result Updated: 01/25/24 1658     POC Glucose 161 mg/dl                    CT abdomen pelvis with contrast   Final Result by Makenzie Calvin MD (01/25 1932)      Trace pleural and abdominal fluid and soft tissue edema likely related to patient's volume status.      No specific findings to explain abdominal pain and vomiting.            Workstation performed: [id number]               Procedures  Procedures      ED Course  ED Course as of 01/25/24 2100   Thu Jan 25, 2024   1718 Hemoglobin(!): 8.4  Increased from 2 days ago                               SBIRT 20yo+      Flowsheet Row Most Recent Value   Initial Alcohol Screen: US AUDIT-C     1. How often do you have a drink containing alcohol? 0 Filed at: 01/25/2024 1732   2. How many drinks containing alcohol do you have on a typical day you are drinking?  0 Filed at: 01/25/2024 1732   3a. Male UNDER 65: How often do you have five or more drinks on one occasion? 0 Filed at: 01/25/2024 1732   Audit-C Score 0 Filed at: 01/25/2024 1732   ALETHEA: How many times in the past year have you...    Used an illegal drug or used a prescription medication for non-medical reasons? Never Filed at: 01/25/2024 1732                  Medical Decision Making    38-year-old male with history of SAH, CVA, ESRD on HD, HTN presents for evaluation of nausea, vomiting and abdominal pain.  Symptoms started yesterday after dialysis session, with generalized abdominal pain, nausea and nonbloody emesis.  Has had 8-10 episodes of vomiting since onset yesterday, has been unable to tolerate p.o. intake, but has been able to keep his hypertension medications down.  Was recently  admitted for SAH and also had a CVA while in hospital.  Denies fever, diarrhea, bloody emesis.  Physical exam remarkable for diffuse abdominal tenderness and guarding.    Differentials include but not limited to bowel ischemia, diverticulitis, gastroenteritis, gastritis, other intra-abdominal abnormalities.  SBP consistently in the 200s during stay.  CT abdomen pelvis with contrast, CBC, CMP, lipase, lactate ordered.  0.5 mg Dilaudid and Zofran given for symptomatic relief.  Labs grossly unremarkable, and CT negative.    During stay, patient's blood pressure remained elevated, with SBP consistently in the 200s despite 20 mg of labetalol given in ED.  Given patient's recent history with CVA and SAH, patient requires admission for further management of hypertensive urgency.    Disposition  Final diagnoses:   Abdominal pain   Hypertensive urgency   Bilious vomiting with nausea     Time reflects when diagnosis was documented in both MDM as applicable and the Disposition within this note       Time User Action Codes Description Comment    1/25/2024  8:50 PM Aw[place], [place] Add [R10.9] Abdominal pain     1/25/2024  8:50 PM Awos[place], Af[place] Add [I16.0] Hypertensive urgency     1/25/2024  8:50 PM Aw[place], [place] Add [R11.14] Bilious vomiting with nausea           ED Disposition       ED Disposition   Admit    Condition   Stable    Date/Time   Thu Jan 25, 2024 2051    Comment   Case was discussed with Dr Riley and the patient's admission status was agreed to be Admission Status: observation status to the service of Dr. Riley .               Follow-up Information    None         Patient's Medications   Discharge Prescriptions    No medications on file     No discharge procedures on file.    PDMP Review         Value Time User    PDMP Reviewed  Yes 7/15/2022  4:20 AM Guanaco Collins MD             ED Provider  Attending physically available and evaluated Mateus Mckeon. I managed the patient along with  the ED Attending.    Electronically Signed by           Margot Cartagena MD  01/25/24 2100

## 2024-01-26 ENCOUNTER — APPOINTMENT (INPATIENT)
Dept: DIALYSIS | Facility: HOSPITAL | Age: 39
DRG: 391 | End: 2024-01-26
Payer: MEDICARE

## 2024-01-26 PROBLEM — I16.0 HYPERTENSIVE URGENCY: Status: ACTIVE | Noted: 2018-02-09

## 2024-01-26 LAB
FLUAV RNA RESP QL NAA+PROBE: NEGATIVE
FLUBV RNA RESP QL NAA+PROBE: NEGATIVE
RSV RNA RESP QL NAA+PROBE: NEGATIVE
SARS-COV-2 RNA RESP QL NAA+PROBE: NEGATIVE

## 2024-01-26 PROCEDURE — 99223 1ST HOSP IP/OBS HIGH 75: CPT | Performed by: INTERNAL MEDICINE

## 2024-01-26 PROCEDURE — 0241U HB NFCT DS VIR RESP RNA 4 TRGT: CPT

## 2024-01-26 PROCEDURE — C9113 INJ PANTOPRAZOLE SODIUM, VIA: HCPCS

## 2024-01-26 PROCEDURE — 99232 SBSQ HOSP IP/OBS MODERATE 35: CPT | Performed by: INTERNAL MEDICINE

## 2024-01-26 PROCEDURE — 5A1D70Z PERFORMANCE OF URINARY FILTRATION, INTERMITTENT, LESS THAN 6 HOURS PER DAY: ICD-10-PCS | Performed by: INTERNAL MEDICINE

## 2024-01-26 RX ORDER — METOCLOPRAMIDE 5 MG/1
5 TABLET ORAL
Status: DISCONTINUED | OUTPATIENT
Start: 2024-01-26 | End: 2024-01-27 | Stop reason: HOSPADM

## 2024-01-26 RX ORDER — LABETALOL 100 MG/1
200 TABLET, FILM COATED ORAL 3 TIMES DAILY
Status: DISCONTINUED | OUTPATIENT
Start: 2024-01-26 | End: 2024-01-27

## 2024-01-26 RX ORDER — HEPARIN SODIUM 5000 [USP'U]/ML
5000 INJECTION, SOLUTION INTRAVENOUS; SUBCUTANEOUS EVERY 8 HOURS SCHEDULED
Status: DISCONTINUED | OUTPATIENT
Start: 2024-01-26 | End: 2024-01-27 | Stop reason: HOSPADM

## 2024-01-26 RX ORDER — CALCIUM ACETATE 667 MG/1
1334 CAPSULE ORAL 3 TIMES DAILY
Status: DISCONTINUED | OUTPATIENT
Start: 2024-01-26 | End: 2024-01-27 | Stop reason: HOSPADM

## 2024-01-26 RX ORDER — HYDRALAZINE HYDROCHLORIDE 20 MG/ML
5 INJECTION INTRAMUSCULAR; INTRAVENOUS ONCE
Status: COMPLETED | OUTPATIENT
Start: 2024-01-26 | End: 2024-01-26

## 2024-01-26 RX ADMIN — CINACALCET 60 MG: 30 TABLET, FILM COATED ORAL at 09:19

## 2024-01-26 RX ADMIN — FAMOTIDINE 10 MG: 20 TABLET, FILM COATED ORAL at 09:17

## 2024-01-26 RX ADMIN — HYDRALAZINE HYDROCHLORIDE 100 MG: 25 TABLET ORAL at 21:19

## 2024-01-26 RX ADMIN — LISINOPRIL 40 MG: 20 TABLET ORAL at 09:06

## 2024-01-26 RX ADMIN — NIFEDIPINE 90 MG: 30 TABLET, FILM COATED, EXTENDED RELEASE ORAL at 21:19

## 2024-01-26 RX ADMIN — ESCITALOPRAM OXALATE 20 MG: 20 TABLET ORAL at 09:18

## 2024-01-26 RX ADMIN — ASPIRIN 81 MG: 81 TABLET, COATED ORAL at 09:06

## 2024-01-26 RX ADMIN — GABAPENTIN 200 MG: 100 CAPSULE ORAL at 21:19

## 2024-01-26 RX ADMIN — LABETALOL HYDROCHLORIDE 200 MG: 100 TABLET, FILM COATED ORAL at 21:19

## 2024-01-26 RX ADMIN — HYDRALAZINE HYDROCHLORIDE 5 MG: 20 INJECTION, SOLUTION INTRAMUSCULAR; INTRAVENOUS at 02:10

## 2024-01-26 RX ADMIN — ONDANSETRON 4 MG: 4 TABLET, ORALLY DISINTEGRATING ORAL at 09:06

## 2024-01-26 RX ADMIN — CLONIDINE HYDROCHLORIDE 0.3 MG: 0.1 TABLET ORAL at 21:19

## 2024-01-26 RX ADMIN — CALCIUM ACETATE 667 MG: 667 CAPSULE ORAL at 09:17

## 2024-01-26 RX ADMIN — CALCIUM ACETATE 1334 MG: 667 CAPSULE ORAL at 21:19

## 2024-01-26 RX ADMIN — HYDRALAZINE HYDROCHLORIDE 100 MG: 25 TABLET ORAL at 09:06

## 2024-01-26 RX ADMIN — CLONIDINE HYDROCHLORIDE 0.3 MG: 0.1 TABLET ORAL at 05:28

## 2024-01-26 RX ADMIN — CLONIDINE HYDROCHLORIDE 0.3 MG: 0.1 TABLET ORAL at 13:16

## 2024-01-26 RX ADMIN — LABETALOL HYDROCHLORIDE 200 MG: 100 TABLET, FILM COATED ORAL at 13:16

## 2024-01-26 RX ADMIN — NIFEDIPINE 90 MG: 30 TABLET, FILM COATED, EXTENDED RELEASE ORAL at 09:17

## 2024-01-26 RX ADMIN — LABETALOL HYDROCHLORIDE 100 MG: 100 TABLET, FILM COATED ORAL at 05:28

## 2024-01-26 RX ADMIN — HEPARIN SODIUM 5000 UNITS: 5000 INJECTION INTRAVENOUS; SUBCUTANEOUS at 05:29

## 2024-01-26 RX ADMIN — HEPARIN SODIUM 5000 UNITS: 5000 INJECTION INTRAVENOUS; SUBCUTANEOUS at 21:20

## 2024-01-26 RX ADMIN — METOCLOPRAMIDE 5 MG: 5 TABLET ORAL at 21:19

## 2024-01-26 RX ADMIN — HYDROXYZINE HYDROCHLORIDE 10 MG: 10 TABLET ORAL at 09:18

## 2024-01-26 RX ADMIN — HEPARIN SODIUM 5000 UNITS: 5000 INJECTION INTRAVENOUS; SUBCUTANEOUS at 13:16

## 2024-01-26 RX ADMIN — PANTOPRAZOLE SODIUM 40 MG: 40 INJECTION, POWDER, FOR SOLUTION INTRAVENOUS at 09:06

## 2024-01-26 NOTE — PLAN OF CARE
Problem: Potential for Falls  Goal: Patient will remain free of falls  Description: INTERVENTIONS:  - Educate patient/family on patient safety including physical limitations  - Instruct patient to call for assistance with activity   - Consult OT/PT to assist with strengthening/mobility   - Keep Call bell within reach  - Keep bed low and locked with side rails adjusted as appropriate  - Keep care items and personal belongings within reach  - Initiate and maintain comfort rounds  - Make Fall Risk Sign visible to staff  - Offer Toileting every 2 Hours, in advance of need  - Initiate/Maintain bed alarm  - Obtain necessary fall risk management equipment: alarms  - Apply yellow socks and bracelet for high fall risk patients  - Consider moving patient to room near nurses station  Outcome: Progressing     Problem: PAIN - ADULT  Goal: Verbalizes/displays adequate comfort level or baseline comfort level  Description: Interventions:  - Encourage patient to monitor pain and request assistance  - Assess pain using appropriate pain scale  - Administer analgesics based on type and severity of pain and evaluate response  - Implement non-pharmacological measures as appropriate and evaluate response  - Consider cultural and social influences on pain and pain management  - Notify physician/advanced practitioner if interventions unsuccessful or patient reports new pain  Outcome: Progressing     Problem: INFECTION - ADULT  Goal: Absence or prevention of progression during hospitalization  Description: INTERVENTIONS:  - Assess and monitor for signs and symptoms of infection  - Monitor lab/diagnostic results  - Monitor all insertion sites, i.e. indwelling lines, tubes, and drains  - Monitor endotracheal if appropriate and nasal secretions for changes in amount and color  - [place] appropriate cooling/warming therapies per order  - Administer medications as ordered  - Instruct and encourage patient and family to use good hand hygiene  technique  - Identify and instruct in appropriate isolation precautions for identified infection/condition  Outcome: Progressing     Problem: DISCHARGE PLANNING  Goal: Discharge to home or other facility with appropriate resources  Description: INTERVENTIONS:  - Identify barriers to discharge w/patient and caregiver  - Arrange for needed discharge resources and transportation as appropriate  - Identify discharge learning needs (meds, wound care, etc.)  - Arrange for interpretive services to assist at discharge as needed  - Refer to Case Management Department for coordinating discharge planning if the patient needs post-hospital services based on physician/advanced practitioner order or complex needs related to functional status, cognitive ability, or social support system  Outcome: Progressing     Problem: Knowledge Deficit  Goal: Patient/family/caregiver demonstrates understanding of disease process, treatment plan, medications, and discharge instructions  Description: Complete learning assessment and assess knowledge base.  Interventions:  - Provide teaching at level of understanding  - Provide teaching via preferred learning methods  Outcome: Progressing

## 2024-01-26 NOTE — ASSESSMENT & PLAN NOTE
Pt presents w/ epigastric pain b/l exquisitely tender to light palpation & diffuse to lower quadrants of relatively sudden onset a couple hours after completing routine & uncomplicated HD for longstanding ESRD day prior to presenting to ER  Since then pt has had nausea & vomiting x 8+ episodes NBNB, no particular remedy had helped until ER  Uncontrolled htn in setting of acute discomfort aforementioned & w/ chronic difficulty to maintain normotensive status, max of 210/110  Labetolol 10 x 3 doses have had mild effect on bp, zofran has adequately addressed nausea per pt, dilaudid helped pain somewhat  Other VSS, CBC only shows chronic anemia unchanged, CMP consistent w/ ESRD, Lipase & Lac negative, CTAP negative new finding  Ddx includes bowel ischemia, obstruction, colitis, gastritis, infection, thus far uncertain w/ unremarkable workup results    Nephro consult placed, follow, appreciate their recs  HD to continue on home schedule while I/p of MWF  Labetalol, hydralazine, lisinopril, clonidine as per nephro recs  Calcium acetate & sensipar home doses continue  Avoiding HETAL taylor for recent HTN & CVA  Renal diet ordered  Pepcid home does, Protonix, Tums placed  Pain reg ordered for opioid naive but high risk re crcl  EKG to assess QTC for nausea regimen

## 2024-01-26 NOTE — ASSESSMENT & PLAN NOTE
Lab Results   Component Value Date    EGFR 7 01/25/2024    EGFR 7 01/23/2024    EGFR 5 01/22/2024    CREATININE 8.01 (H) 01/25/2024    CREATININE 8.12 (H) 01/23/2024    CREATININE 10.84 (H) 01/22/2024     Will continue MWF schedule for HD while I/p per nephro

## 2024-01-26 NOTE — CONSULTS
"Consultation - Mateus Mckeon 38 y.o. male MRN: [id number]    Unit/Bed#: S -01 Encounter: [id number]      Assessment/Plan     Assessment:  This is a 38 y.o.-year-old male with type 1 diabetes mellitus admitted nausea vomiting abdominal pain after outpatient dialysis session yesterday.  Endocrinology consulted for management of glycemic control     Plan:    Patient is adept with using his OmniPod 5 insulin pump while inpatient and his glycemic control is suboptimal at this time.  Continue patient's insulin pump with settings noted below:  Basal rate:   Midnight to 6 AM 0.5 units/h,   6 AM to midnight 0.65 units/h    ICR 1:14    ISF 1:50    Target glucose 120    Continue to check blood glucose levels   Monitor for hypoglycemia and treat according to protocol  Patient may be discharged home on his current pump settings when medically stable per primary team.  He will follow-up with [facility] endocrinology as outpatient    Patient seen at bedside and management plan discussed with attending physician.  Thank you for letting us participate in the care of your patient, please do not hesitate to reach out with questions.  Please Tigertext questions to the clinician covering the \"[id number]\" Role. Thank you.      CC: Diabetes Consult    History of Present Illness     HPI: Mateus Mckeon is a 38 y.o. year old male with type 1 diabetes on insulin pump, CVA, ESRD on hemodialysis MWF, gastroparesis presented with nausea vomiting abdominal pain about 3 to 4 hours after hemodialysis session, hypertensive urgency on admission.  Patient had recent discharge 3 days ago from [facility] where he was admitted on January 5, 2024 for headache concerning for SAH, CVA presentation, found to have embolic infarcts with RUE weakness 1/12.  Recent admission he was seen by endocrinology for managing his glycemic control and stayed on his pump at [facility].  Been noted below:    Uses the OmniPod 5 " and auto mode with Dexcom G6 via his phone carrol.     Basal rate:   Midnight to 6 AM 0.5 units/h,   6 AM to midnight 0.65 units/h    ICR 1:14    ISF 1:50    Target glucose 120  Active insulin time 4 hours    During encounter, patient getting dialysis and alert awake oriented, reports his abdominal pain and vomiting has subsided and appetite is improving.  He is able to do accurate carb counting and operate his pump while inpatient.  States that he had COVID exposure with sick contacts at home.    Consults    Review of Systems  10 point ROS performed, negative except stated above.    Historical Information   Past Medical History:   Diagnosis Date    Acute kidney injury (HCC)     Ambulates with cane     Anuria     Anxiety     Cellulitis of right elbow 03/31/2021    Chronic kidney disease     Depression     Diabetes mellitus (HCC)     Diarrhea     Emesis 10/24/2020    End stage renal disease (HCC) 02/11/2018    Formatting of this note might be different from the original. Last Assessment & Plan:  Secondary to DM.  On nightly PD.  Followed by [facility].  Patient considering transplant for kidney and pancreas through [facility] Formatting of this note might be different from the original. Last Assessment & Plan:  Formatting of this note might be different from the original. 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    Esophagitis 07/21/2015    Falls     Gastroparesis     GERD (gastroesophageal reflux disease)     History of shingles 2010    History of transfusion 02/2018    no adverse reaction    Hyperlipidemia     Hyperphosphatemia     Hypertension     Hypoglycemia 07/15/2022    Itching     Mastoiditis of right side 07/15/2022    Muscle weakness     general unsteadiness    Obesity (BMI 30.0-34.9) 09/09/2019    Orthostatic hypotension 10/25/2020    Peripheral polyneuropathy 11/20/2019    PONV (postoperative nausea and vomiting) 01/26/2018    Protein-calorie malnutrition (HCC) 11/23/2020     Recurrent peritonitis (HCC) due to peritoneal dialysis catheter 07/31/2020    Retinopathy     Seizures (HCC)     early 2020 - one time    Skin abnormality     some dime size areas where skin was scratched from itching    Spontaneous bacterial peritonitis (HCC) 10/19/2020    Squamous cell skin cancer     left temple    Stroke (HCC)     x2 - off balance/no driving/fatigue    Swelling of both lower extremities     Traumatic onycholysis 07/21/2022    Vomiting     Wears glasses      Past Surgical History:   Procedure Laterality Date    CARDIAC ELECTROPHYSIOLOGY PROCEDURE N/A 9/21/2023    Procedure: Cardiac loop recorder explant;  Surgeon: Parish Morgan MD;  Location: [facility];  Service: Cardiology    CARDIAC LOOP RECORDER  05/2018    COLONOSCOPY      EGD      EYE SURGERY Right     IR AV FISTULAGRAM/GRAFTOGRAM  02/23/2021    IR CEREBRAL ANGIOGRAPHY  1/12/2024    IR CEREBRAL ANGIOGRAPHY / INTERVENTION  1/5/2024    IR TUNNELED CENTRAL LINE PLACEMENT  02/16/2021    IR TUNNELED DIALYSIS CATHETER PLACEMENT  11/18/2020    IR TUNNELED DIALYSIS CATHETER REMOVAL  02/12/2021    IR TUNNELED DIALYSIS CATHETER REMOVAL  03/11/2021    MOHS SURGERY Left 12/14/2022    Left temple with Dr. Hassan    PERITONEAL CATHETER INSERTION N/A 08/27/2018    Procedure: UNROOF PD CATHETER;  Surgeon: Felipe Lindo DO;  Location: [facility];  Service: General    MA ARTERIOVENOUS ANASTOMOSIS OPEN DIRECT Left 11/09/2020    Procedure: CREATION FISTULA  ARTERIOVENOUS (AV) - LEFT WRIST;  Surgeon: Placido Altamirano MD;  Location: [facility];  Service: Vascular    MA ESOPHAGOGASTRODUODENOSCOPY TRANSORAL DIAGNOSTIC N/A 04/18/2019    Procedure: ESOPHAGOGASTRODUODENOSCOPY (EGD);  Surgeon: Ale Figueroa MD;  Location: [facility];  Service: Gastroenterology    MA LAPS INSERTION TUNNELED INTRAPERITONEAL CATHETER N/A 08/06/2018    Procedure: LAPAROSCOPIC PD CATHETER PLACEMENT;  Surgeon: Felipe Lindo DO;  Location: [facility];  Service: General    MA  REMOVAL TUNNELED INTRAPERITONEAL CATHETER N/A 2020    Procedure: REMOVAL CATHETER PERITONEAL DIALYSIS;  Surgeon: Abdifatah Ty MD;  Location: [facility];  Service: General    TONSILLECTOMY      UPPER GASTROINTESTINAL ENDOSCOPY       Social History   Social History     Substance and Sexual Activity   Alcohol Use Not Currently     Social History     Substance and Sexual Activity   Drug Use Yes    Types: Marijuana    Comment: medical marijuana     Social History     Tobacco Use   Smoking Status Former    Current packs/day: 0.00    Average packs/day: 0.5 packs/day for 12.0 years (6.0 ttl pk-yrs)    Types: Cigarettes    Start date: 2006    Quit date: 2018    Years since quittin.9   Smokeless Tobacco Never   Tobacco Comments    quit 2018     Family History:   Family History   Problem Relation Age of Onset    Breast cancer Mother     Hypertension Mother     Hyperlipidemia Father     Hypertension Father     Leukemia Maternal Grandmother     Hyperlipidemia Maternal Grandfather     Hypertension Maternal Grandfather     Hyperlipidemia Paternal Grandmother     Hypertension Paternal Grandmother     Heart disease Paternal Grandfather         cardiac disorder    Diabetes Paternal Grandfather        Meds/Allergies   Current Facility-Administered Medications   Medication Dose Route Frequency Provider Last Rate Last Admin    aspirin (ECOTRIN LOW STRENGTH) EC tablet 81 mg  81 mg Oral Daily Tomás Harris, DO   81 mg at 24 0906    atorvastatin (LIPITOR) tablet 40 mg  40 mg Oral QPM Tomás Harris, DO   40 mg at 24 2353    calcium acetate (PHOSLO) capsule 1,334 mg  1,334 mg Oral TID Arturo Velazquez MD        calcium carbonate (TUMS) chewable tablet 1,000 mg  1,000 mg Oral Daily PRN Tomás Harris, DO        cinacalcet (SENSIPAR) tablet 60 mg  60 mg Oral Daily Tomás Harris, DO   60 mg at 24 0919    cloNIDine (CATAPRES) tablet 0.3 mg  0.3 mg Oral Q8H HALIE Tomás Harris, DO   0.3 mg at 24 1316    escitalopram  (LEXAPRO) tablet 20 mg  20 mg Oral Daily Tomás Harris, DO   20 mg at 01/26/24 0918    famotidine (PEPCID) tablet 10 mg  10 mg Oral QAM Tomás Harris, DO   10 mg at 01/26/24 0917    gabapentin (NEURONTIN) capsule 200 mg  200 mg Oral HS Tomás Harris, DO   200 mg at 01/25/24 2354    heparin (porcine) subcutaneous injection 5,000 Units  5,000 Units Subcutaneous Q8H HALIE Tomás Harris, DO   5,000 Units at 01/26/24 1316    hydrALAZINE (APRESOLINE) tablet 100 mg  100 mg Oral TID Tomás Harris, DO   100 mg at 01/26/24 0906    HYDROmorphone (DILAUDID) injection 0.5 mg  0.5 mg Intravenous Q3H PRN Tomás Harris, DO        hydrOXYzine HCL (ATARAX) tablet 10 mg  10 mg Oral Q6H PRN Tomás Harris, DO   10 mg at 01/26/24 0918    insulin lispro (HumaLOG) 100 units/mL subcutaneous injection 1-6 Units  1-6 Units Subcutaneous TID AC Tomás Harris, DO        insulin lispro (HumaLOG) 100 units/mL subcutaneous injection 1-6 Units  1-6 Units Subcutaneous HS Tomás Harris, DO        labetalol (NORMODYNE) injection 10 mg  10 mg Intravenous Q6H PRN Tomás Harris, DO   10 mg at 01/25/24 2126    labetalol (NORMODYNE) tablet 200 mg  200 mg Oral TID Arturo Velazquez MD   200 mg at 01/26/24 1316    lisinopril (ZESTRIL) tablet 40 mg  40 mg Oral Daily Tomás Harris, DO   40 mg at 01/26/24 0906    metoclopramide (REGLAN) tablet 5 mg  5 mg Oral 4x Daily (AC & HS) Sudarshan Milan Cha, MD        multivitamin stress formula tablet 1 tablet  1 tablet Oral Daily Before Lunch Tomás Harris, DO        NIFEdipine (PROCARDIA XL) 24 hr tablet 90 mg  90 mg Oral BID Tomás Harris, DO   90 mg at 01/26/24 0917    oxyCODONE (ROXICODONE) split tablet 2.5 mg  2.5 mg Oral Q4H PRN Tomás Harris, DO        Or    oxyCODONE (ROXICODONE) IR tablet 5 mg  5 mg Oral Q4H PRN Tomás Harris,         pantoprazole (PROTONIX) injection 40 mg  40 mg Intravenous Q24H [facility] Tomás Harris,    40 mg at 01/26/24 0906    Patiromer Sorbitex Calcium PACK 5 g  5 g Oral Weekly Tomás Harris,         traZODone  "(DESYREL) tablet 25 mg  25 mg Oral HS PRN Tomás Harris, DO         Allergies   Allergen Reactions    Sulfa Antibiotics Rash       Objective   Vitals: Blood pressure (!) 183/90, pulse 77, temperature 98.8 °F (37.1 °C), temperature source Oral, resp. rate 16, height 5' 5\" (1.651 m), weight 94.3 kg (208 lb), SpO2 97%.  No intake or output data in the 24 hours ending 01/26/24 1331  Invasive Devices       Peripheral Intravenous Line  Duration             Peripheral IV 01/25/24 Right Antecubital <1 day              Line  Duration             Hemodialysis AV Fistula 11/09/20 Left Other (Comment) 1173 days                    Physical Exam  HENT:      Head: Normocephalic.   Eyes:      Conjunctiva/sclera: Conjunctivae normal.   Pulmonary:      Effort: Pulmonary effort is normal.   Musculoskeletal:      Comments: Right upper extremity weakness   Skin:     General: Skin is dry.   Neurological:      Mental Status: He is alert and oriented to person, place, and time.   Psychiatric:         Mood and Affect: Mood normal.         Thought Content: Thought content normal.         The history was obtained from the review of the chart, patient.    Lab Results:   Lab Results   Component Value Date    HGBA1C 6.0 (H) 01/14/2024           Lab Results   Component Value Date    WBC 6.38 01/25/2024    HGB 8.4 (L) 01/25/2024    HCT 25.9 (L) 01/25/2024    MCV 96 01/25/2024     01/25/2024     Lab Results   Component Value Date/Time    BUN 27 (H) 01/25/2024 05:06 PM    BUN 34 (H) 11/03/2015 11:32 AM     10/28/2015 05:43 PM    K 4.6 01/25/2024 05:06 PM    K 3.6 10/28/2015 05:43 PM    CL 95 (L) 01/25/2024 05:06 PM     10/28/2015 05:43 PM    CO2 35 (H) 01/25/2024 05:06 PM    CO2 24 02/21/2020 08:50 AM    CREATININE 8.01 (H) 01/25/2024 05:06 PM    CREATININE 1.90 (H) 11/03/2015 11:33 AM    AST 28 01/25/2024 05:06 PM    AST 10 10/28/2015 05:43 PM    ALT 22 01/25/2024 05:06 PM    ALT 25 10/28/2015 05:43 PM    TP 5.9 (L) 01/25/2024 " "05:06 PM    ALB 3.8 01/25/2024 05:06 PM    ALB 4.0 10/28/2015 05:43 PM     No results for input(s): \"CHOL\", \"HDL\", \"LDL\", \"TRIG\", \"VLDL\" in the last 72 hours.  No results found for: \"MICROALBUR\", \"[id number]\"  POC Glucose (mg/dl)   Date Value   01/25/2024 161 (H)   01/23/2024 192 (H)   01/22/2024 170 (H)   01/22/2024 152 (H)   01/21/2024 182 (H)   01/21/2024 183 (H)   01/21/2024 235 (H)   01/21/2024 154 (H)   01/20/2024 120   01/20/2024 242 (H)     Portions of the record may have been created with voice recognition software.    "

## 2024-01-26 NOTE — ASSESSMENT & PLAN NOTE
Patient has hx of chronic GERD. Maintained on PTA pepcid during admission.     Plan:   Follow up with PCP outpatient.

## 2024-01-26 NOTE — CONSULTS
NEPHROLOGY HOSPITAL CONSULTATION   Mateus Mckeon 38 y.o. male MRN: [id number]  Unit/Bed#: ED-30 Encounter: [id number]    ASSESSMENT and PLAN:    38-year-old male with a history of end-stage renal disease on hemodialysis, resistant hypertension who was recently discharged from [facility] who presents for nausea vomiting and abdominal pain.  Nephrology counts for ESRD management.    End Stage Renal Disease  -Modality:In-Center Hemodialysis  -Schedule: Monday/Wednesday/Friday  -Dialysis Unit: [facility]  -Access: Left Arm AV Fistula  -Presciption: Reviewed  -Status: Underwent last hemodialysis on Wednesday at his outpatient unit.  Volume status has improved from the last hospitalization.  -Plan:   Plan for next hemodialysis tomorrow for 4 hours.     Anemia  -Avoid HETAL at this time due to history of recent CVA along with uncontrolled hypertension  -Hemoglobin remains below target but is stable.  Once his blood pressure under better control will need to be reevaluated about HETAL and the risks and benefits and my recommendation would be after few months target hemoglobin between 9-10 with HETLA as long as the blood pressure is well-controlled.  At this time we will continue to hold HETAL     Mineral bone disorder-chronic kidney disease  -Continue home calcium acetate with meals along with Sensipar  -Renal diet though I doubt he will eat much due to his GI symptoms     Blood pressure/volume status  -Patient has resistant hypertension  -Recent CVA  -Blood pressure again high likely in the setting of nausea vomiting retching  -Start as needed IV labetalol as he may have tolerating his oral medications for tonight  -Volume status has improved since last admission  -Clonidine 0.3 mg every 8 hours, hydralazine 100 mg 3 times daily, lisinopril 40 mg daily, labetalol 100 mg 3 times daily nifedipine 90 mg twice a day  -Continue home medications though may not be able to tolerate a lot of this due to his GI  symptoms  -Outpatient dry weight 93 kg     Hyperkalemia--resolved     CVA  -MRI shows a close Endo multifocal acute and recent infarctions  -Management as per neurology  -Continue blood pressure control and aim for gentle reduction  -Continue aspirin and Lipitor  -Subarachnoid hemorrhage, status post cerebral angiography x 2 earlier this month     Chronic pruritus  -- Patient on Korsuva as an outpatient on HD    Nausea vomiting/abdominal pain  --Follow-up CT scan       SUMMARY OF RECOMMENDATIONS:    Please see above    HISTORY OF PRESENT ILLNESS:  Requesting Physician: Dulce Mcguire MD  Reason for Consult: ESRD    Mateus Mckeon is a 38 y.o. male who was admitted to [facility] after presenting with nausea vomiting and belly pain. A renal consultation is requested today for assistance in the management of ESRD.  Patient was recently discharged from [facility] this past Tuesday after being treated for acute CVA.  He presents now for nausea vomiting and GI symptoms which started on Wednesday after dialysis.  No chest pain or shortness of breath.  Underwent dialysis on Wednesday.    PAST MEDICAL HISTORY:  Past Medical History:   Diagnosis Date    Acute kidney injury (HCC)     Ambulates with cane     Anuria     Anxiety     Cellulitis of right elbow 03/31/2021    Chronic kidney disease     Depression     Diabetes mellitus (HCC)     Diarrhea     Emesis 10/24/2020    End stage renal disease (HCC) 02/11/2018    Formatting of this note might be different from the original. Last Assessment & Plan:  Secondary to DM.  On nightly PD.  Followed by Nephro.  [facility] Formatting of this note might be different from the original. Last Assessment & Plan:  Formatting of this note might be different from the original. 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    Esophagitis 07/21/2015    Falls      Gastroparesis     GERD (gastroesophageal reflux disease)     History of shingles 2010    History of transfusion 02/2018    no adverse reaction    Hyperlipidemia     Hyperphosphatemia     Hypertension     Hypoglycemia 07/15/2022    Itching     Mastoiditis of right side 07/15/2022    Muscle weakness     general unsteadiness    Obesity (BMI 30.0-34.9) 09/09/2019    Orthostatic hypotension 10/25/2020    Peripheral polyneuropathy 11/20/2019    PONV (postoperative nausea and vomiting) 01/26/2018    Protein-calorie malnutrition (HCC) 11/23/2020    Recurrent peritonitis (HCC) due to peritoneal dialysis catheter 07/31/2020    Retinopathy     Seizures (HCC)     early 2020 - one time    Skin abnormality     some dime size areas where skin was scratched from itching    Spontaneous bacterial peritonitis (HCC) 10/19/2020    Squamous cell skin cancer     left temple    Stroke (HCC)     x2 - off balance/no driving/fatigue    Swelling of both lower extremities     Traumatic onycholysis 07/21/2022    Vomiting     Wears glasses        PAST SURGICAL HISTORY:  Past Surgical History:   Procedure Laterality Date    CARDIAC ELECTROPHYSIOLOGY PROCEDURE N/A 9/21/2023    Procedure: Cardiac loop recorder explant;  Surgeon: Parish Morgan MD;  Location: [facility];  Service: Cardiology    CARDIAC LOOP RECORDER  05/2018    COLONOSCOPY      EGD      EYE SURGERY Right     IR AV FISTULAGRAM/GRAFTOGRAM  02/23/2021    IR CEREBRAL ANGIOGRAPHY  1/12/2024    IR CEREBRAL ANGIOGRAPHY / INTERVENTION  1/5/2024    IR TUNNELED CENTRAL LINE PLACEMENT  02/16/2021    IR TUNNELED DIALYSIS CATHETER PLACEMENT  11/18/2020    IR TUNNELED DIALYSIS CATHETER REMOVAL  02/12/2021    IR TUNNELED DIALYSIS CATHETER REMOVAL  03/11/2021    MOHS SURGERY Left 12/14/2022    Left temple with Dr. Hassan    PERITONEAL CATHETER INSERTION N/A 08/27/2018    Procedure: UNROOF PD CATHETER;  Surgeon: Felipe Lindo DO;  Location: [facility];  Service: General    WY ARTERIOVENOUS  ANASTOMOSIS OPEN DIRECT Left 2020    Procedure: CREATION FISTULA  ARTERIOVENOUS (AV) - LEFT WRIST;  Surgeon: Placido Altamirano MD;  Location: [facility];  Service: Vascular    MD ESOPHAGOGASTRODUODENOSCOPY TRANSORAL DIAGNOSTIC N/A 2019    Procedure: ESOPHAGOGASTRODUODENOSCOPY (EGD);  Surgeon: Ale Figueroa MD;  Location: [facility];  Service: Gastroenterology    MD LAPS INSERTION TUNNELED INTRAPERITONEAL CATHETER N/A 2018    Procedure: LAPAROSCOPIC PD CATHETER PLACEMENT;  Surgeon: Felipe Lindo DO;  Location: [facility];  Service: General    MD REMOVAL TUNNELED INTRAPERITONEAL CATHETER N/A 2020    Procedure: REMOVAL CATHETER PERITONEAL DIALYSIS;  Surgeon: Abdifatah Ty MD;  Location: [facility];  Service: General    TONSILLECTOMY      UPPER GASTROINTESTINAL ENDOSCOPY         ALLERGIES:  Allergies   Allergen Reactions    Sulfa Antibiotics Rash       SOCIAL HISTORY:  Social History     Substance and Sexual Activity   Alcohol Use Not Currently     Social History     Substance and Sexual Activity   Drug Use Yes    Types: Marijuana    Comment: medical marijuana     Social History     Tobacco Use   Smoking Status Former    Current packs/day: 0.00    Average packs/day: 0.5 packs/day for 12.0 years (6.0 ttl pk-yrs)    Types: Cigarettes    Start date: 2006    Quit date: 2018    Years since quittin.9   Smokeless Tobacco Never   Tobacco Comments    quit 2018       FAMILY HISTORY:  Family History   Problem Relation Age of Onset    Breast cancer Mother     Hypertension Mother     Hyperlipidemia Father     Hypertension Father     Leukemia Maternal Grandmother     Hyperlipidemia Maternal Grandfather     Hypertension Maternal Grandfather     Hyperlipidemia Paternal Grandmother     Hypertension Paternal Grandmother     Heart disease Paternal Grandfather         cardiac disorder    Diabetes Paternal Grandfather        MEDICATIONS:  No current facility-administered medications for this  encounter.    Current Outpatient Medications:     aspirin (ECOTRIN LOW STRENGTH) 81 mg EC tablet, Take 81 mg by mouth daily, Disp: , Rfl:     atorvastatin (LIPITOR) 40 mg tablet, Take 1 tablet (40 mg total) by mouth every evening, Disp: 90 tablet, Rfl: 3    b complex vitamins capsule, Take 1 capsule by mouth daily before lunch , Disp: , Rfl:     B-D ULTRAFINE III SHORT PEN 31G X 8 MM MISC, USE 1 PEN NEEDLE 8 TIMES DAILY, Disp: 100 each, Rfl: 47    calcium acetate (PHOSLO) capsule, Take 1 capsule (667 mg total) by mouth 3 (three) times a day, Disp: 90 capsule, Rfl: 0    cinacalcet (SENSIPAR) 60 MG tablet, Take 1 tablet (60 mg total) by mouth daily, Disp: 30 tablet, Rfl: 0    cloNIDine (CATAPRES-TTS-3) 0.3 mg/24 hr, 1 patch once a week, Disp: , Rfl:     escitalopram (LEXAPRO) 20 mg tablet, Take 1 tablet (20 mg total) by mouth daily, Disp: 90 tablet, Rfl: 2    famotidine (PEPCID) 40 MG tablet, TAKE 1 TABLET BY MOUTH IN THE MORNING, Disp: 90 tablet, Rfl: 0    gabapentin (NEURONTIN) 100 mg capsule, TAKE 2 CAPSULES BY MOUTH AT BEDTIME, Disp: 180 capsule, Rfl: 1    GLUCAGON EMERGENCY 1 MG injection, , Disp: , Rfl: 3    Gvoke HypoPen 1-Pack 1 MG/0.2ML SOAJ, , Disp: , Rfl:     hydrALAZINE (APRESOLINE) 100 MG tablet, Take 1 tablet (100 mg total) by mouth 3 (three) times a day, Disp: 90 tablet, Rfl: 0    hydrOXYzine HCL (ATARAX) 10 mg tablet, Take 1 tablet (10 mg total) by mouth every 6 (six) hours as needed for itching or anxiety, Disp: 30 tablet, Rfl: 3    Insulin Disposable Pump (Omnipod 5 G6 Intro, Gen 5,) KIT, , Disp: , Rfl:     Insulin Disposable Pump (Omnipod 5 G6 Pod, Gen 5,) MISC, , Disp: , Rfl:     labetalol (NORMODYNE) 200 mg tablet, Take 4 tablets (800 mg total) by mouth 3 (three) times a day, Disp: 180 tablet, Rfl: 1    lisinopril (ZESTRIL) 40 mg tablet, Take 1 tablet (40 mg total) by mouth daily, Disp: 30 tablet, Rfl: 0    NIFEdipine (PROCARDIA XL) 90 mg 24 hr tablet, Take 1 tablet (90 mg total) by mouth 2 (two)  times a day, Disp: 30 tablet, Rfl: 0    NovoLOG 100 UNIT/ML injection, , Disp: , Rfl:     ondansetron (ZOFRAN) 4 mg tablet, Take 1 tablet (4 mg total) by mouth every 8 (eight) hours as needed for nausea or vomiting, Disp: 90 tablet, Rfl: 0    Patiromer Sorbitex Calcium (VELTASSA PO), Take 5 g by mouth once a week, Disp: , Rfl:     saccharomyces boulardii (FLORASTOR) 250 mg capsule, Take 250 mg by mouth daily, Disp: , Rfl:     SPS 15 GM/60ML suspension, TAKE 60 ML BY MOUTH SINGLE DOSE FOR EMERGENCY USE DURING MISSED HEMODIALYSIS, Disp: , Rfl:     traZODone (DESYREL) 50 mg tablet, Take 0.5 tablets (25 mg total) by mouth daily at bedtime as needed for sleep, Disp: 30 tablet, Rfl: 3    triamcinolone (KENALOG) 0.1 % ointment, Apply topically 2 (two) times a day For up to 14 days on the itchy areas. Avoid groin, underarms and face. It is important to take at least 1 week break between uses., Disp: 454 g, Rfl: 0    REVIEW OF SYSTEMS:  Constitutional: Negative for fatigue, anorexia, fever, chills, diaphoresis  HENT: Negative for postnasal drip  Eyes: Negative for visual disturbance.   Respiratory: Negative for cough, shortness of breath and wheezing.   Cardiovascular: Negative for chest pain, palpitations and leg swelling.   Gastrointestinal: Negative for abdominal pain, constipation, diarrhea, nausea and vomiting.   Genitourinary: No dysuria, hematuria  Endocrine: Negative for polyuria.   Musculoskeletal: Negative for arthralgias, back pain and joint swelling.   Skin: Negative for rash.   Neurological: Negative for focal weakness, headaches, dizziness.  Hematological: Negative for easy bruising or bleeding.  Psychiatric/Behavioral: Negative for confusion and sleep disturbance.   All the systems were reviewed and were negative except as documented on the HPI.    PHYSICAL EXAM:  Current Weight:    First Weight:    Vitals:    01/25/24 1800 01/25/24 1900 01/25/24 1930 01/25/24 1945   BP: (!) 219/101 (!) 217/97 (!) 206/95 (!)  205/83   BP Location: Right arm  Right arm    Pulse: 77 78 77 76   Resp: 16 16 16 18   Temp:       TempSrc:       SpO2: 96% 98% 97% 97%     No intake or output data in the 24 hours ending 01/25/24 2009  Physical Exam  Vitals and nursing note reviewed.   Constitutional:       General: He is not in acute distress.     Appearance: He is well-developed.   HENT:      Head: Normocephalic and atraumatic.   Eyes:      General: No scleral icterus.     Conjunctiva/sclera: Conjunctivae normal.      Pupils: Pupils are equal, round, and reactive to light.   Cardiovascular:      Rate and Rhythm: Normal rate and regular rhythm.      Heart sounds: S1 normal and S2 normal. No murmur heard.     No friction rub. No gallop.   Pulmonary:      Effort: Pulmonary effort is normal. No respiratory distress.      Breath sounds: Normal breath sounds. No wheezing or rales.   Abdominal:      General: Bowel sounds are normal.      Palpations: Abdomen is soft.      Tenderness: There is no abdominal tenderness. There is no rebound.   Musculoskeletal:         General: Normal range of motion.      Cervical back: Normal range of motion and neck supple.   Skin:     Findings: No rash.   Neurological:      Mental Status: He is alert and oriented to person, place, and time.   Psychiatric:         Behavior: Behavior normal.           Invasive Devices:      Lab Results:   Results from last 7 days   Lab Units 01/25/24  1706 01/23/24  0608 01/22/24  0835 01/21/24  0848   WBC Thousand/uL 6.38 5.84  --  6.16   HEMOGLOBIN g/dL 8.4* 7.9*  --  8.6*   HEMATOCRIT % 25.9* 25.3*  --  26.9*   PLATELETS Thousands/uL 234 255  --  272   POTASSIUM mmol/L 4.6 5.1 6.3* 5.5*   CHLORIDE mmol/L 95* 100 99 100   CO2 mmol/L 35* 27 26 29   BUN mg/dL 27* 35* 49* 38*   CREATININE mg/dL 8.01* 8.12* 10.84* 9.64*   CALCIUM mg/dL 9.0 7.8* 8.1* 8.5   ALK PHOS U/L 72 69  --  88   ALT U/L 22 12  --  9   AST U/L 28 19  --  12*     Other Studies:

## 2024-01-26 NOTE — ASSESSMENT & PLAN NOTE
Pt presents w/ epigastric pain b/l exquisitely tender to light palpation & diffuse to lower quadrants of relatively sudden onset a couple hours after completing routine & uncomplicated HD for longstanding ESRD day prior to presenting to ER  Since then pt has had nausea & vomiting x 8+ episodes NBNB, no particular remedy had helped until ER  Uncontrolled htn in setting of acute discomfort aforementioned & w/ chronic difficulty to maintain normotensive status, max of 210/110  Labetolol 10 x 3 doses have had mild effect on bp, zofran has adequately addressed nausea per pt, dilaudid helped pain somewhat  Other VSS, CBC only shows chronic anemia unchanged, CMP consistent w/ ESRD, Lipase & Lac negative, CTAP negative new finding  Ddx includes bowel ischemia, obstruction, colitis, gastritis, infection, thus far uncertain w/ unremarkable workup results    Abdominal pain resolved on morning of admission after being treated with supportive care and home medications.     Plan:   Follow up with PCP outpatient

## 2024-01-26 NOTE — ASSESSMENT & PLAN NOTE
Lab Results   Component Value Date    EGFR 10 01/27/2024    EGFR 7 01/25/2024    EGFR 7 01/23/2024    CREATININE 6.05 (H) 01/27/2024    CREATININE 8.01 (H) 01/25/2024    CREATININE 8.12 (H) 01/23/2024     Patient continued on MWF dialysis schedule during admission.   Last dialysis on 1/26.     Plan:   Follow up with Nephrology outpatient.   Continue MWF outpatient dialysis.

## 2024-01-26 NOTE — ED NOTES
Patient requesting to keep his insulin pump while he is admitted. Dr. Harris made aware.     Nuris Mcnair RN  01/25/24 3535     Nuris Mcnair RN  01/25/24 4813

## 2024-01-26 NOTE — PROGRESS NOTES
NEPHROLOGY PROGRESS NOTE   Mateus Mckeon 38 y.o. male MRN: [id number]  Unit/Bed#: ED-30 Encounter: [id number]    ASSESSMENT & PLAN:     38-year-old male with a history of end-stage renal disease on hemodialysis, resistant hypertension who was recently discharged from [facility] who presents for nausea vomiting and abdominal pain.  Recent hospital admission at [facility] for acute CVA.  Nephrology consulted  for ESRD management.   End-stage renal disease on dialysis  -Outpatient unit: [facility]  -Schedule: Monday Wednesday Friday  -Access: Left arm AV fistula  -Plan: Plan for hemodialysis treatment today with ultrafiltration, CT abdomen pelvis with suggestive of trace pleural and abdominal fluid.  Plan to do 4 hours of HD treatment    Volume status  -Fluid overload: Ultrafiltration with HD today.  Outpatient dry weight 93 kg    CKD/MBD  -Hyperphosphatemia: Continue PhosLo-2 tablet 3 times daily with meals.  As per Care Everywhere records patient is also on Velphoro 500 mg 3 times daily with meal which can be resumed after discharge  -Secondary hyperparathyroidism of renal origin continue Sensipar 60 mg by mouth daily    Blood pressure  -Resistant hypertension.  Recent CVA  -Blood pressure elevated  -current medications-Clonidine 0.3 mg every 8 hours, hydralazine 100 mg 3 times daily, lisinopril 40 mg daily, labetalol 100 mg 3 times daily nifedipine 90 mg twice a day   -Due to elevated blood pressure, increase labetalol to 200 mg 3 times daily.  As per Care Everywhere patient is on higher dose of labetalol  - continue IV as needed labetalol every 6 hours for systolic blood pressure more than 200    Electrolytes:   -Stable.  As per Care Everywhere record, patient is on Veltassa 5 mg on nondialysis days.  Plan for hemodialysis treatment using only 2K bath    Anemia due to ESRD  -Goal hemoglobin:  10-11 grams/deciliter  -Current hemoglobin:  8.4 g/dl  -Plan: Not on HETAL due to CVA recently, hb  below goal. Monitor     Abdominal pain/complain of nausea vomiting: Management per primary team  Recent CVA: Management per neurology  Chronic pruritus: On Korsuva as outpatient with HD    Discussed with primary team regarding plan for hemodialysis treatment today and agreed with the plan    SUBJECTIVE:  Headaches improved.  Still with nausea    OBJECTIVE:  Current Weight: Weight - Scale: 94.3 kg (208 lb)  Vitals:    01/26/24 1120   BP: (!) 182/83   Pulse:    Resp:    Temp:    SpO2:      No intake or output data in the 24 hours ending 01/26/24 1131    Physical Exam  General:  Ill looking, awake.  Eyes: Conjunctivae pink,  Sclera anicteric  ENT: lips and mucous membranes moist  Neck: supple   Chest: Clear to Auscultation both lungs,  no crackles, ronchus or wheezing.  CVS: S1 & S2 present, normal rate, regular rhythm, no murmur.  Abdomen: soft, non-tender, non-distended, Bowel sounds normoactive  Extremities: no edema of  legs  Skin: no rash  Neuro: awake, alert, oriented  Psych: Mood and affect appropriate      Medications:    Current Facility-Administered Medications:     aspirin (ECOTRIN LOW STRENGTH) EC tablet 81 mg, 81 mg, Oral, Daily, Tomás Harris, DO, 81 mg at 01/26/24 0906    atorvastatin (LIPITOR) tablet 40 mg, 40 mg, Oral, QPM, Tomás Harris, DO, 40 mg at 01/25/24 2353    calcium acetate (PHOSLO) capsule 667 mg, 667 mg, Oral, TID, Tomás Harris, DO, 667 mg at 01/26/24 0917    calcium carbonate (TUMS) chewable tablet 1,000 mg, 1,000 mg, Oral, Daily PRN, Tomás Harris, DO    cinacalcet (SENSIPAR) tablet 60 mg, 60 mg, Oral, Daily, Tomás Harris, DO, 60 mg at 01/26/24 0919    cloNIDine (CATAPRES) tablet 0.3 mg, 0.3 mg, Oral, Q8H HALIE, Tomás Harris, DO, 0.3 mg at 01/26/24 0528    escitalopram (LEXAPRO) tablet 20 mg, 20 mg, Oral, Daily, Tomás Harris, DO, 20 mg at 01/26/24 0918    famotidine (PEPCID) tablet 10 mg, 10 mg, Oral, QAM, Tomás Harris, DO, 10 mg at 01/26/24 0917    gabapentin (NEURONTIN) capsule 200 mg, 200  mg, Oral, HS, Tomás Harris, DO, 200 mg at 01/25/24 2354    heparin (porcine) subcutaneous injection 5,000 Units, 5,000 Units, Subcutaneous, Q8H HALIE, Tomás Harris, DO, 5,000 Units at 01/26/24 0529    hydrALAZINE (APRESOLINE) tablet 100 mg, 100 mg, Oral, TID, Tomás Harris, DO, 100 mg at 01/26/24 0906    HYDROmorphone (DILAUDID) injection 0.5 mg, 0.5 mg, Intravenous, Q3H PRN, Tomás Harris, DO    hydrOXYzine HCL (ATARAX) tablet 10 mg, 10 mg, Oral, Q6H PRN, Tomás Harris, DO, 10 mg at 01/26/24 0918    insulin lispro (HumaLOG) 100 units/mL subcutaneous injection 1-6 Units, 1-6 Units, Subcutaneous, TID AC **AND** Fingerstick Glucose (POCT), , , TID AC, Tomás Harris, DO    insulin lispro (HumaLOG) 100 units/mL subcutaneous injection 1-6 Units, 1-6 Units, Subcutaneous, HS, Tomás Harris, DO    labetalol (NORMODYNE) injection 10 mg, 10 mg, Intravenous, Q6H PRN, Tomás Harris, DO, 10 mg at 01/25/24 2126    labetalol (NORMODYNE) tablet 100 mg, 100 mg, Oral, TID, Tomás Harris, DO, 100 mg at 01/26/24 0528    lisinopril (ZESTRIL) tablet 40 mg, 40 mg, Oral, Daily, Tomás Harris, DO, 40 mg at 01/26/24 0906    metoclopramide (REGLAN) tablet 5 mg, 5 mg, Oral, 4x Daily (AC & HS), Sudarshan Milan Cha, MD    multivitamin stress formula tablet 1 tablet, 1 tablet, Oral, Daily Before Lunch, Tomás Harris DO    NIFEdipine (PROCARDIA XL) 24 hr tablet 90 mg, 90 mg, Oral, BID, Tomás Harris, , 90 mg at 01/26/24 0917    oxyCODONE (ROXICODONE) split tablet 2.5 mg, 2.5 mg, Oral, Q4H PRN **OR** oxyCODONE (ROXICODONE) IR tablet 5 mg, 5 mg, Oral, Q4H PRN, Tomás Harris DO    pantoprazole (PROTONIX) injection 40 mg, 40 mg, Intravenous, Q24H HALIE, Tomás Harris, , 40 mg at 01/26/24 0906    Patiromer Sorbitex Calcium PACK 5 g, 5 g, Oral, Weekly, Tomás Harris DO    traZODone (DESYREL) tablet 25 mg, 25 mg, Oral, HS PRN, Tomás Harris DO    Current Outpatient Medications:     aspirin (ECOTRIN LOW STRENGTH) 81 mg EC tablet, Take 81 mg by mouth daily, Disp: ,  Rfl:     atorvastatin (LIPITOR) 40 mg tablet, Take 1 tablet (40 mg total) by mouth every evening, Disp: 90 tablet, Rfl: 3    b complex vitamins capsule, Take 1 capsule by mouth daily before lunch , Disp: , Rfl:     B-D ULTRAFINE III SHORT PEN 31G X 8 MM MISC, USE 1 PEN NEEDLE 8 TIMES DAILY, Disp: 100 each, Rfl: 47    calcium acetate (PHOSLO) capsule, Take 1 capsule (667 mg total) by mouth 3 (three) times a day, Disp: 90 capsule, Rfl: 0    cinacalcet (SENSIPAR) 60 MG tablet, Take 1 tablet (60 mg total) by mouth daily, Disp: 30 tablet, Rfl: 0    cloNIDine (CATAPRES-TTS-3) 0.3 mg/24 hr, 1 patch once a week, Disp: , Rfl:     escitalopram (LEXAPRO) 20 mg tablet, Take 1 tablet (20 mg total) by mouth daily, Disp: 90 tablet, Rfl: 2    famotidine (PEPCID) 40 MG tablet, TAKE 1 TABLET BY MOUTH IN THE MORNING, Disp: 90 tablet, Rfl: 0    gabapentin (NEURONTIN) 100 mg capsule, TAKE 2 CAPSULES BY MOUTH AT BEDTIME, Disp: 180 capsule, Rfl: 1    GLUCAGON EMERGENCY 1 MG injection, , Disp: , Rfl: 3    Gvoke HypoPen 1-Pack 1 MG/0.2ML SOAJ, , Disp: , Rfl:     hydrALAZINE (APRESOLINE) 100 MG tablet, Take 1 tablet (100 mg total) by mouth 3 (three) times a day, Disp: 90 tablet, Rfl: 0    hydrOXYzine HCL (ATARAX) 10 mg tablet, Take 1 tablet (10 mg total) by mouth every 6 (six) hours as needed for itching or anxiety, Disp: 30 tablet, Rfl: 3    Insulin Disposable Pump (Omnipod 5 G6 Intro, Gen 5,) KIT, , Disp: , Rfl:     Insulin Disposable Pump (Omnipod 5 G6 Pod, Gen 5,) MISC, , Disp: , Rfl:     labetalol (NORMODYNE) 200 mg tablet, Take 4 tablets (800 mg total) by mouth 3 (three) times a day, Disp: 180 tablet, Rfl: 1    lisinopril (ZESTRIL) 40 mg tablet, Take 1 tablet (40 mg total) by mouth daily, Disp: 30 tablet, Rfl: 0    NIFEdipine (PROCARDIA XL) 90 mg 24 hr tablet, Take 1 tablet (90 mg total) by mouth 2 (two) times a day, Disp: 30 tablet, Rfl: 0    NovoLOG 100 UNIT/ML injection, , Disp: , Rfl:     ondansetron (ZOFRAN) 4 mg tablet, Take 1  "tablet (4 mg total) by mouth every 8 (eight) hours as needed for nausea or vomiting, Disp: 90 tablet, Rfl: 0    Patiromer Sorbitex Calcium (VELTASSA PO), Take 5 g by mouth once a week, Disp: , Rfl:     saccharomyces boulardii (FLORASTOR) 250 mg capsule, Take 250 mg by mouth daily, Disp: , Rfl:     SPS 15 GM/60ML suspension, TAKE 60 ML BY MOUTH SINGLE DOSE FOR EMERGENCY USE DURING MISSED HEMODIALYSIS, Disp: , Rfl:     traZODone (DESYREL) 50 mg tablet, Take 0.5 tablets (25 mg total) by mouth daily at bedtime as needed for sleep, Disp: 30 tablet, Rfl: 3    triamcinolone (KENALOG) 0.1 % ointment, Apply topically 2 (two) times a day For up to 14 days on the itchy areas. Avoid groin, underarms and face. It is important to take at least 1 week break between uses., Disp: 454 g, Rfl: 0    Invasive Devices:        Lab Results:   Results from last 7 days   Lab Units 01/25/24  1706 01/23/24  0608 01/22/24  0835 01/21/24  0848   WBC Thousand/uL 6.38 5.84  --  6.16   HEMOGLOBIN g/dL 8.4* 7.9*  --  8.6*   HEMATOCRIT % 25.9* 25.3*  --  26.9*   PLATELETS Thousands/uL 234 255  --  272   POTASSIUM mmol/L 4.6 5.1 6.3* 5.5*   CHLORIDE mmol/L 95* 100 99 100   CO2 mmol/L 35* 27 26 29   BUN mg/dL 27* 35* 49* 38*   CREATININE mg/dL 8.01* 8.12* 10.84* 9.64*   CALCIUM mg/dL 9.0 7.8* 8.1* 8.5   ALK PHOS U/L 72 69  --  88   ALT U/L 22 12  --  9   AST U/L 28 19  --  12*       Previous work up:         Portions of the record may have been created with voice recognition software. Occasional wrong word or \"sound a like\" substitutions may have occurred due to the inherent limitations of voice recognition software. Read the chart carefully and recognize, using context, where substitutions have occurred.If you have any questions, please contact the dictating provider.   "

## 2024-01-26 NOTE — H&P
[facility]  H&P  Name: Mateus Mckeon 38 y.o. male I MRN: [id number]  Unit/Bed#: ED-30 I Date of Admission: 1/25/2024   Date of Service: 1/26/2024 I Hospital Day: 0      Assessment/Plan   * Abdominal pain, acute, epigastric  Assessment & Plan  Pt presents w/ epigastric pain b/l exquisitely tender to light palpation & diffuse to lower quadrants of relatively sudden onset a couple hours after completing routine & uncomplicated HD for longstanding ESRD day prior to presenting to ER  Since then pt has had nausea & vomiting x 8+ episodes NBNB, no particular remedy had helped until ER  Uncontrolled htn in setting of acute discomfort aforementioned & w/ chronic difficulty to maintain normotensive status, max of 210/110  Labetolol 10 x 3 doses have had mild effect on bp, zofran has adequately addressed nausea per pt, dilaudid helped pain somewhat  Other VSS, CBC only shows chronic anemia unchanged, CMP consistent w/ ESRD, Lipase & Lac negative, CTAP negative new finding  Ddx includes bowel ischemia, obstruction, colitis, gastritis, infection, thus far uncertain w/ unremarkable workup results    Nephro consult placed, follow, appreciate their recs  HD to continue on home schedule while I/p of MWF  Labetalol, hydralazine, lisinopril, clonidine as per nephro recs  Calcium acetate & sensipar home doses continue  Avoiding HETAL taylor for recent HTN & CVA  Renal diet ordered  Pepcid home does, Protonix, Tums placed  Pain reg ordered for opioid naive but high risk re crcl  EKG to assess QTC for nausea regimen    Hypertensive urgency  Assessment & Plan  Presents w/ HTN to max 220/110, chronic issue w/ pt ESRD on HD, but acutely worsened in setting of nausea / vomiting / abd pains, nephro seen & following, due for MWF schedule of HD, nephro recs for BP regimen has been ordered & to be amended as needed    End stage kidney disease (HCC)  Assessment & Plan  Lab Results   Component Value Date    EGFR 7  01/25/2024    EGFR 7 01/23/2024    EGFR 5 01/22/2024    CREATININE 8.01 (H) 01/25/2024    CREATININE 8.12 (H) 01/23/2024    CREATININE 10.84 (H) 01/22/2024     Will continue MWF schedule for HD while I/p per nephro    Type 1 diabetes mellitus (HCC)  Assessment & Plan  Lab Results   Component Value Date    HGBA1C 6.0 (H) 01/14/2024       Recent Labs     01/23/24  1116 01/25/24  1657   POCGLU 192* 161*         Blood Sugar Average: Last 72 hrs:  (P) 161    Will be placed on ISS while I/p, although has pump & can use w/ regular glucose checks    Anemia in chronic kidney disease, on chronic dialysis (HCC)  Assessment & Plan  Lab Results   Component Value Date    EGFR 7 01/25/2024    EGFR 7 01/23/2024    EGFR 5 01/22/2024    CREATININE 8.01 (H) 01/25/2024    CREATININE 8.12 (H) 01/23/2024    CREATININE 10.84 (H) 01/22/2024     Consistant today w/ his typical chronic anemia, nephro following, holding off on HETAL    Hyperlipidemia  Assessment & Plan  Continue on home dose statin while I/p    GERD (gastroesophageal reflux disease)  Assessment & Plan  Chronic in nature, will keep on pepcid home dose & add tums + protonix while I/p for possible gastritis           VTE Pharmacologic Prophylaxis: VTE Score: 7 High Risk (Score >/= 5) - Pharmacological DVT Prophylaxis Ordered: heparin. Sequential Compression Devices Ordered.  Code Status: Level 1 - Full Code Yes  Discussion with family: Patient declined call to .     Anticipated Length of Stay: Patient will be admitted on an observation basis with an anticipated length of stay of less than 2 midnights secondary to epigastric pain.    Chief Complaint: Abd pain w/ nausea & vomiting    History of Present Illness:  Mateus Mckeon is a 38 y.o. male with a PMH of recent SAH, recent CVA including multiple vessel territories, T1DM, ESRD on HD for several years, who presents with severe abd pains located predominantly in the epigastric region b/l that has waxed & waned  since its onset a couple hours after most recent HD tx yesterday, w/ associated nausea & several episodes of NBNB vomiting thus far. Related poor oral intake, w/ medications attempted but sometimes followed by vomiting. ER has given dilaudid & zofran for pain & nausea to mild & moderate relief respectively. VSS excepting BP remaining around 200/95 despite labetalol 10 mg x 3 doses in ER, now restarting combination of home dose htn meds w/ new nephro recs placed in their note of 01/25. CBC shwos stable chronic anemia, CMP consistent w/ ESRD, Lipase & Lac negative. CTAP unrevealing, leaving the ddx still open for pt sx as noted above.    Review of Systems:  Review of Systems   Constitutional:  Positive for activity change, appetite change and fatigue. Negative for chills, diaphoresis, fever and unexpected weight change.   HENT:  Negative for congestion, postnasal drip, rhinorrhea, sinus pressure, sinus pain, sneezing and sore throat.    Eyes:  Negative for photophobia and visual disturbance.   Respiratory:  Negative for cough, choking, chest tightness and shortness of breath.    Cardiovascular:  Negative for chest pain, palpitations and leg swelling.   Gastrointestinal:  Positive for abdominal pain, nausea and vomiting. Negative for abdominal distention, anal bleeding, blood in stool, constipation, diarrhea and rectal pain.   Endocrine: Negative for cold intolerance and heat intolerance.   Genitourinary:  Negative for flank pain, hematuria and urgency.   Musculoskeletal:  Negative for neck pain and neck stiffness.   Allergic/Immunologic: Negative for environmental allergies, food allergies and immunocompromised state.   Neurological:  Positive for facial asymmetry, weakness and headaches. Negative for dizziness, tremors, seizures, syncope, speech difficulty, light-headedness and numbness.   Hematological:  Negative for adenopathy. Does not bruise/bleed easily.   Psychiatric/Behavioral:  Negative for agitation,  behavioral problems and confusion.        Past Medical and Surgical History:   Past Medical History:   Diagnosis Date    Acute kidney injury (HCC)     Ambulates with cane     Anuria     Anxiety     Cellulitis of right elbow 03/31/2021    Chronic kidney disease     Depression     Diabetes mellitus (HCC)     Diarrhea     Emesis 10/24/2020    End stage renal disease (HCC) 02/11/2018    Formatting of this note might be different from the original. Last Assessment & Plan:  Secondary to DM.  On nightly PD.  Followed by Nephro.  Patient considering transplant for kidney and pancreas through [facility] Formatting of this note might be different from the original. Last Assessment & Plan:  Formatting of this note might be different from the original. 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    Esophagitis 07/21/2015    Falls     Gastroparesis     GERD (gastroesophageal reflux disease)     History of shingles 2010    History of transfusion 02/2018    no adverse reaction    Hyperlipidemia     Hyperphosphatemia     Hypertension     Hypoglycemia 07/15/2022    Itching     Mastoiditis of right side 07/15/2022    Muscle weakness     general unsteadiness    Obesity (BMI 30.0-34.9) 09/09/2019    Orthostatic hypotension 10/25/2020    Peripheral polyneuropathy 11/20/2019    PONV (postoperative nausea and vomiting) 01/26/2018    Protein-calorie malnutrition (HCC) 11/23/2020    Recurrent peritonitis (HCC) due to peritoneal dialysis catheter 07/31/2020    Retinopathy     Seizures ([facility])     early 2020 - one time    Skin abnormality     some dime size areas where skin was scratched from itching    Spontaneous bacterial peritonitis (HCC) 10/19/2020    Squamous cell skin cancer     left temple    Stroke ([facility])     x2 - off balance/no driving/fatigue    Swelling of both lower extremities     Traumatic onycholysis 07/21/2022    Vomiting     Wears glasses        Past Surgical History:   Procedure Laterality Date     CARDIAC ELECTROPHYSIOLOGY PROCEDURE N/A 9/21/2023    Procedure: Cardiac loop recorder explant;  Surgeon: Parish Morgan MD;  [facility]: [facility];  Service: Cardiology    CARDIAC LOOP RECORDER  05/2018    COLONOSCOPY      EGD      EYE SURGERY Right     IR AV FISTULAGRAM/GRAFTOGRAM  02/23/2021    IR CEREBRAL ANGIOGRAPHY  1/12/2024    IR CEREBRAL ANGIOGRAPHY / INTERVENTION  1/5/2024    IR TUNNELED CENTRAL LINE PLACEMENT  02/16/2021    IR TUNNELED DIALYSIS CATHETER PLACEMENT  11/18/2020    IR TUNNELED DIALYSIS CATHETER REMOVAL  02/12/2021    IR TUNNELED DIALYSIS CATHETER REMOVAL  03/11/2021    MOHS SURGERY Left 12/14/2022    Left temple with Dr. Hassan    PERITONEAL CATHETER INSERTION N/A 08/27/2018    Procedure: UNROOF PD CATHETER;  Surgeon: Felipe Lindo DO;  Location: [facility];  Service: General    CT ARTERIOVENOUS ANASTOMOSIS OPEN DIRECT Left 11/09/2020    Procedure: CREATION FISTULA  ARTERIOVENOUS (AV) - LEFT WRIST;  Surgeon: Placido Altamirano MD;  Location: [facility];  Service: Vascular    CT ESOPHAGOGASTRODUODENOSCOPY TRANSORAL DIAGNOSTIC N/A 04/18/2019    Procedure: ESOPHAGOGASTRODUODENOSCOPY (EGD);  Surgeon: Ale Figueroa MD;  Location: [facility];  Service: Gastroenterology    CT LAPS INSERTION TUNNELED INTRAPERITONEAL CATHETER N/A 08/06/2018    Procedure: LAPAROSCOPIC PD CATHETER PLACEMENT;  Surgeon: Felipe Lindo DO;  Location: [facility];  Service: General    CT REMOVAL TUNNELED INTRAPERITONEAL CATHETER N/A 11/18/2020    Procedure: REMOVAL CATHETER PERITONEAL DIALYSIS;  Surgeon: Abdifatah Ty MD;  [facility]: [facility];  Service: General    TONSILLECTOMY      UPPER GASTROINTESTINAL ENDOSCOPY         Meds/Allergies:  Prior to Admission medications    Medication Sig Start Date End Date Taking? Authorizing Provider   aspirin (ECOTRIN LOW STRENGTH) 81 mg EC tablet Take 81 mg by mouth daily    Historical Provider, MD   atorvastatin (LIPITOR) 40 mg tablet Take 1 tablet (40 mg total) by mouth every  evening 10/5/23   Dania Sher DO   b complex vitamins capsule Take 1 capsule by mouth daily before lunch     Historical Provider, MD QUEEN ULTRAFINE III SHORT PEN 31G X 8 MM MISC USE 1 PEN NEEDLE 8 TIMES DAILY 9/24/19   Dania Sher DO   calcium acetate (PHOSLO) capsule Take 1 capsule (667 mg total) by mouth 3 (three) times a day 1/23/24   Court Edwards MD   cinacalcet (SENSIPAR) 60 MG tablet Take 1 tablet (60 mg total) by mouth daily 1/23/24   Court Edwards MD   cloNIDine (CATAPRES-TTS-3) 0.3 mg/24 hr 1 patch once a week    Historical Provider, MD   escitalopram (LEXAPRO) 20 mg tablet Take 1 tablet (20 mg total) by mouth daily 1/24/24   Pedrito Bermeo MD   famotidine (PEPCID) 40 MG tablet TAKE 1 TABLET BY MOUTH IN THE MORNING 12/15/23   Dania Sher DO   gabapentin (NEURONTIN) 100 mg capsule TAKE 2 CAPSULES BY MOUTH AT BEDTIME 12/26/23   Dania Sher DO   GLUCAGON EMERGENCY 1 MG injection  7/24/19   Historical Provider, MD   Gvoke HypoPen 1-Pack 1 MG/0.2ML SOAJ  8/10/22   Historical Provider, MD   hydrALAZINE (APRESOLINE) 100 MG tablet Take 1 tablet (100 mg total) by mouth 3 (three) times a day 1/23/24   Court Edwards MD   hydrOXYzine HCL (ATARAX) 10 mg tablet Take 1 tablet (10 mg total) by mouth every 6 (six) hours as needed for itching or anxiety 12/7/22   Pedrito Bermeo MD   Insulin Disposable Pump (Omnipod 5 G6 Intro, Gen 5,) KIT  1/17/23   Historical Provider, MD   Insulin Disposable Pump (Omnipod 5 G6 Pod, Gen 5,) MISC  3/24/23   Historical Provider, MD   labetalol (NORMODYNE) 200 mg tablet Take 4 tablets (800 mg total) by mouth 3 (three) times a day 1/23/24   Court Edwards MD   lisinopril (ZESTRIL) 40 mg tablet Take 1 tablet (40 mg total) by mouth daily 1/23/24   Court Edwards MD   NIFEdipine (PROCARDIA XL) 90 mg 24 hr tablet Take 1 tablet (90 mg total) by mouth 2 (two) times a day 1/23/24   Court Edwards MD   NovoLOG 100 UNIT/ML injection   23   Historical Provider, MD   ondansetron (ZOFRAN) 4 mg tablet Take 1 tablet (4 mg total) by mouth every 8 (eight) hours as needed for nausea or vomiting 23   DO Jamel Sage Sorbitex Calcium (VELTASSA PO) Take 5 g by mouth once a week    Historical Provider, MD   saccharomyces boulardii (FLORASTOR) 250 mg capsule Take 250 mg by mouth daily    Historical Provider, MD   SPS 15 GM/60ML suspension TAKE 60 ML BY MOUTH SINGLE DOSE FOR EMERGENCY USE DURING MISSED HEMODIALYSIS 22   Historical Provider, MD   traZODone (DESYREL) 50 mg tablet Take 0.5 tablets (25 mg total) by mouth daily at bedtime as needed for sleep 22   Pedrito Bermeo MD   triamcinolone (KENALOG) 0.1 % ointment Apply topically 2 (two) times a day For up to 14 days on the itchy areas. Avoid groin, underarms and face. It is important to take at least 1 week break between uses. 10/14/22   Abigail Alcantar MD     I have reviewed home medications with patient personally.    Allergies:   Allergies   Allergen Reactions    Sulfa Antibiotics Rash       Social History:  Marital Status: Single   Occupation: N/A  Patient Pre-hospital Living Situation: Home  Patient Pre-hospital Level of Mobility: walks  Patient Pre-hospital Diet Restrictions: Renal  Substance Use History:   Social History     Substance and Sexual Activity   Alcohol Use Not Currently     Social History     Tobacco Use   Smoking Status Former    Current packs/day: 0.00    Average packs/day: 0.5 packs/day for 12.0 years (6.0 ttl pk-yrs)    Types: Cigarettes    Start date: 2006    Quit date: 2018    Years since quittin.9   Smokeless Tobacco Never   Tobacco Comments    quit 2018     Social History     Substance and Sexual Activity   Drug Use Yes    Types: Marijuana    Comment: medical marijuana       Family History:  Family History   Problem Relation Age of Onset    Breast cancer Mother     Hypertension Mother     Hyperlipidemia Father     Hypertension  "Father     Leukemia Maternal Grandmother     Hyperlipidemia Maternal Grandfather     Hypertension Maternal Grandfather     Hyperlipidemia Paternal Grandmother     Hypertension Paternal Grandmother     Heart disease Paternal Grandfather         cardiac disorder    Diabetes Paternal Grandfather        Physical Exam:     Vitals:   Blood Pressure: (!) 190/97 (01/26/24 0515)  Pulse: 86 (01/26/24 0528)  Temperature: 97.9 °F (36.6 °C) (01/25/24 1609)  Temp Source: Oral (01/25/24 1609)  Respirations: 16 (01/26/24 0515)  Height: 5' 5\" (165.1 cm) (01/25/24 2315)  Weight - Scale: 94.3 kg (208 lb) (01/25/24 2315)  SpO2: 97 % (01/26/24 0330)    Physical Exam  Vitals and nursing note reviewed.   Constitutional:       Appearance: He is ill-appearing. He is not diaphoretic.   HENT:      Head: Normocephalic and atraumatic.      Nose: Nose normal. No congestion or rhinorrhea.   Eyes:      General: No scleral icterus.        Right eye: No discharge.         Left eye: No discharge.      Conjunctiva/sclera: Conjunctivae normal.   Cardiovascular:      Rate and Rhythm: Normal rate and regular rhythm.      Pulses: Normal pulses.      Heart sounds: Normal heart sounds.   Pulmonary:      Effort: Pulmonary effort is normal. No respiratory distress.      Breath sounds: Normal breath sounds.   Chest:      Chest wall: No tenderness.   Abdominal:      Tenderness: There is abdominal tenderness. There is guarding.   Musculoskeletal:         General: No tenderness or signs of injury.      Cervical back: No rigidity or tenderness.   Skin:     General: Skin is warm and dry.   Neurological:      Mental Status: He is alert and oriented to person, place, and time.      Cranial Nerves: Cranial nerve deficit present.      Motor: Weakness present.   Psychiatric:         Mood and Affect: Mood normal.         Behavior: Behavior normal.          Additional Data:     Lab Results:  Results from last 7 days   Lab Units 01/25/24  1706   WBC Thousand/uL 6.38 "   HEMOGLOBIN g/dL 8.4*   HEMATOCRIT % 25.9*   PLATELETS Thousands/uL 234   NEUTROS PCT % 72   LYMPHS PCT % 16   MONOS PCT % 7   EOS PCT % 4     Results from last 7 days   Lab Units 01/25/24  1706   SODIUM mmol/L 140   POTASSIUM mmol/L 4.6   CHLORIDE mmol/L 95*   CO2 mmol/L 35*   BUN mg/dL 27*   CREATININE mg/dL 8.01*   ANION GAP mmol/L 10   CALCIUM mg/dL 9.0   ALBUMIN g/dL 3.8   TOTAL BILIRUBIN mg/dL 0.59   ALK PHOS U/L 72   ALT U/L 22   AST U/L 28   GLUCOSE RANDOM mg/dL 163*         Results from last 7 days   Lab Units 01/25/24  1657 01/23/24  1116 01/22/24  2344 01/22/24  0551 01/21/24  2300 01/21/24  1816 01/21/24  1109 01/21/24  0641 01/20/24  2312 01/20/24  1811 01/20/24  1307 01/20/24  0617   POC GLUCOSE mg/dl 161* 192* 170* 152* 182* 183* 235* 154* 120 242* 126 135         Results from last 7 days   Lab Units 01/25/24  1706   LACTIC ACID mmol/L 0.7       Lines/Drains:  Invasive Devices       Peripheral Intravenous Line  Duration             Peripheral IV 01/25/24 Right Antecubital <1 day              Line  Duration             Hemodialysis AV Fistula 11/09/20 Left Other (Comment) 1172 days                        Imaging: Reviewed radiology reports from this admission including: abdominal/pelvic CT  CT abdomen pelvis with contrast   Final Result by Makenzie Calvin MD (01/25 1932)      Trace pleural and abdominal fluid and soft tissue edema likely related to patient's volume status.      No specific findings to explain abdominal pain and vomiting.            Workstation performed: [id number]             EKG and Other Studies Reviewed on Admission:   EKG: NSR. HR 80.    ** Please Note: This note has been constructed using a voice recognition system. **

## 2024-01-26 NOTE — ASSESSMENT & PLAN NOTE
Chronic in nature, will keep on pepcid home dose & add tums + protonix while I/p for possible gastritis

## 2024-01-26 NOTE — ED NOTES
PER ENDO NOTE FROM 1/10:  Basal rate:                          MN-6A: 0.5u              6A-MN: 0.65u  Insulin to carb ratio:14  Insulin sensitivity factor:50  BG target:120  AIT: 4h  Type of insulin:Novolog     Anny Martínez RN  01/26/24 0304

## 2024-01-26 NOTE — ASSESSMENT & PLAN NOTE
Lab Results   Component Value Date    HGBA1C 6.0 (H) 01/14/2024       Recent Labs     01/27/24  0729 01/27/24  1114   POCGLU 133 165*       Blood Sugar Average: Last 72 hrs:  (P) 153    Endocrinology consulted during admission. Patient maintained on insulin pump during admission.     Plan:   Follow up with Endocrinology outpatient.

## 2024-01-26 NOTE — ASSESSMENT & PLAN NOTE
Recent Labs     01/27/24  0711   HGB 7.9*     Patient has hx of anemia of chronic kidney disease and receives Epo outpatient.   Patient at baseline during admission.     Plan:   Follow up with Nephrology outpatient.

## 2024-01-26 NOTE — PHYSICAL THERAPY NOTE
01/26/24 1409   Note Type   Note type Cancelled Session   Cancel Reasons Patient off floor/hemodialysis   Additional Comments PT orders received and chart reviewed. Pt currently receiving HD. Will follow.   Licensure   [place] License Number  Alyx Myers PT

## 2024-01-26 NOTE — PLAN OF CARE
TX plan reviewed with Dr Velazquez and he is agreeable to the following: Goal is to UF 2-2.5 L on a 2 K+ bath for a morning serum K+ of 4.6, 4 HR TX.    Problem: METABOLIC, FLUID AND ELECTROLYTES - ADULT  Goal: Electrolytes maintained within normal limits  Description: INTERVENTIONS:  - Monitor labs and assess patient for signs and symptoms of electrolyte imbalances  - Administer electrolyte replacement as ordered  - Monitor response to electrolyte replacements, including repeat lab results as appropriate  - Instruct patient on fluid and nutrition as appropriate  Outcome: Progressing  Goal: Fluid balance maintained  Description: INTERVENTIONS:  - Monitor labs   - Monitor I/O and WT  - Instruct patient on fluid and nutrition as appropriate  - Assess for signs & symptoms of volume excess or deficit  Outcome: Progressing     Post-Dialysis RN Treatment Note    Blood Pressure:  Pre 167/102 mm/Hg  Post 169/96 mmHg   EDW  92 kg    Weight:  Pre 93.5 kg   Post 91 kg   Mode of weight measurement: Standing scale   Volume Removed  2500 ml net    Treatment duration 240 minutes    NS given  No    Treatment shortened? No   Medications given during Rx None Reported   Estimated Kt/V  1.27   Access type: AV fistula   Access Issues: No    Report called to primary nurse   Yes,  Delisa Arboleda, RN

## 2024-01-26 NOTE — ASSESSMENT & PLAN NOTE
Patient continued on PTA atorvastatin 40mg during admission and at discharge.     Plan:   Follow up with PCP outpatient.

## 2024-01-26 NOTE — ASSESSMENT & PLAN NOTE
Presents w/ HTN to max 220/110, chronic issue w/ pt ESRD on HD, but acutely worsened in setting of nausea / vomiting / abd pains. Nephrology saw patient during admission and increased PTA labetalol to 400mg TID.     Plan:   Follow up with Nephrology outpatient.

## 2024-01-26 NOTE — ASSESSMENT & PLAN NOTE
Presents w/ HTN to max 220/110, chronic issue w/ pt ESRD on HD, but acutely worsened in setting of nausea / vomiting / abd pains, nephro seen & following, due for MWF schedule of HD, nephro recs for BP regimen has been ordered & to be amended as needed

## 2024-01-27 VITALS
HEART RATE: 69 BPM | TEMPERATURE: 97.9 F | BODY MASS INDEX: 33.54 KG/M2 | OXYGEN SATURATION: 100 % | WEIGHT: 201.28 LBS | RESPIRATION RATE: 18 BRPM | HEIGHT: 65 IN | SYSTOLIC BLOOD PRESSURE: 187 MMHG | DIASTOLIC BLOOD PRESSURE: 87 MMHG

## 2024-01-27 LAB
ALBUMIN SERPL BCP-MCNC: 3.7 G/DL (ref 3.5–5)
ALP SERPL-CCNC: 73 U/L (ref 34–104)
ALT SERPL W P-5'-P-CCNC: 21 U/L (ref 7–52)
ANION GAP SERPL CALCULATED.3IONS-SCNC: 7 MMOL/L
AST SERPL W P-5'-P-CCNC: 25 U/L (ref 13–39)
ATRIAL RATE: 72 BPM
BASOPHILS # BLD AUTO: 0.07 THOUSANDS/ÂΜL (ref 0–0.1)
BASOPHILS NFR BLD AUTO: 2 % (ref 0–1)
BILIRUB SERPL-MCNC: 0.58 MG/DL (ref 0.2–1)
BUN SERPL-MCNC: 17 MG/DL (ref 5–25)
CALCIUM SERPL-MCNC: 8.5 MG/DL (ref 8.4–10.2)
CHLORIDE SERPL-SCNC: 97 MMOL/L (ref 96–108)
CO2 SERPL-SCNC: 33 MMOL/L (ref 21–32)
CREAT SERPL-MCNC: 6.05 MG/DL (ref 0.6–1.3)
EOSINOPHIL # BLD AUTO: 0.25 THOUSAND/ÂΜL (ref 0–0.61)
EOSINOPHIL NFR BLD AUTO: 5 % (ref 0–6)
ERYTHROCYTE [DISTWIDTH] IN BLOOD BY AUTOMATED COUNT: 14.1 % (ref 11.6–15.1)
GFR SERPL CREATININE-BSD FRML MDRD: 10 ML/MIN/1.73SQ M
GLUCOSE SERPL-MCNC: 128 MG/DL (ref 65–140)
GLUCOSE SERPL-MCNC: 133 MG/DL (ref 65–140)
GLUCOSE SERPL-MCNC: 165 MG/DL (ref 65–140)
HCT VFR BLD AUTO: 24.5 % (ref 36.5–49.3)
HGB BLD-MCNC: 7.9 G/DL (ref 12–17)
IMM GRANULOCYTES # BLD AUTO: 0.01 THOUSAND/UL (ref 0–0.2)
IMM GRANULOCYTES NFR BLD AUTO: 0 % (ref 0–2)
LYMPHOCYTES # BLD AUTO: 1.24 THOUSANDS/ÂΜL (ref 0.6–4.47)
LYMPHOCYTES NFR BLD AUTO: 26 % (ref 14–44)
MCH RBC QN AUTO: 30.9 PG (ref 26.8–34.3)
MCHC RBC AUTO-ENTMCNC: 32.2 G/DL (ref 31.4–37.4)
MCV RBC AUTO: 96 FL (ref 82–98)
MONOCYTES # BLD AUTO: 0.45 THOUSAND/ÂΜL (ref 0.17–1.22)
MONOCYTES NFR BLD AUTO: 10 % (ref 4–12)
NEUTROPHILS # BLD AUTO: 2.74 THOUSANDS/ÂΜL (ref 1.85–7.62)
NEUTS SEG NFR BLD AUTO: 57 % (ref 43–75)
NRBC BLD AUTO-RTO: 0 /100 WBCS
P AXIS: 82 DEGREES
PLATELET # BLD AUTO: 209 THOUSANDS/UL (ref 149–390)
PMV BLD AUTO: 10 FL (ref 8.9–12.7)
POTASSIUM SERPL-SCNC: 4.5 MMOL/L (ref 3.5–5.3)
PR INTERVAL: 180 MS
PROT SERPL-MCNC: 5.8 G/DL (ref 6.4–8.4)
QRS AXIS: 68 DEGREES
QRSD INTERVAL: 76 MS
QT INTERVAL: 428 MS
QTC INTERVAL: 468 MS
RBC # BLD AUTO: 2.56 MILLION/UL (ref 3.88–5.62)
SODIUM SERPL-SCNC: 137 MMOL/L (ref 135–147)
T WAVE AXIS: 53 DEGREES
VENTRICULAR RATE: 72 BPM
WBC # BLD AUTO: 4.76 THOUSAND/UL (ref 4.31–10.16)

## 2024-01-27 PROCEDURE — 99239 HOSP IP/OBS DSCHRG MGMT >30: CPT | Performed by: INTERNAL MEDICINE

## 2024-01-27 PROCEDURE — 99232 SBSQ HOSP IP/OBS MODERATE 35: CPT | Performed by: INTERNAL MEDICINE

## 2024-01-27 PROCEDURE — 80053 COMPREHEN METABOLIC PANEL: CPT

## 2024-01-27 PROCEDURE — 85025 COMPLETE CBC W/AUTO DIFF WBC: CPT

## 2024-01-27 PROCEDURE — 82948 REAGENT STRIP/BLOOD GLUCOSE: CPT

## 2024-01-27 PROCEDURE — C9113 INJ PANTOPRAZOLE SODIUM, VIA: HCPCS

## 2024-01-27 RX ORDER — LABETALOL 100 MG/1
400 TABLET, FILM COATED ORAL 3 TIMES DAILY
Status: DISCONTINUED | OUTPATIENT
Start: 2024-01-27 | End: 2024-01-27 | Stop reason: HOSPADM

## 2024-01-27 RX ORDER — LABETALOL 200 MG/1
400 TABLET, FILM COATED ORAL 3 TIMES DAILY
Qty: 180 TABLET | Refills: 0 | Status: SHIPPED | OUTPATIENT
Start: 2024-01-27

## 2024-01-27 RX ORDER — METOCLOPRAMIDE 5 MG/1
5 TABLET ORAL 3 TIMES DAILY PRN
Qty: 90 TABLET | Refills: 0 | Status: SHIPPED | OUTPATIENT
Start: 2024-01-27

## 2024-01-27 RX ADMIN — PANTOPRAZOLE SODIUM 40 MG: 40 INJECTION, POWDER, FOR SOLUTION INTRAVENOUS at 08:52

## 2024-01-27 RX ADMIN — LABETALOL HYDROCHLORIDE 200 MG: 100 TABLET, FILM COATED ORAL at 05:07

## 2024-01-27 RX ADMIN — CALCIUM ACETATE 1334 MG: 667 CAPSULE ORAL at 08:53

## 2024-01-27 RX ADMIN — METOCLOPRAMIDE 5 MG: 5 TABLET ORAL at 12:42

## 2024-01-27 RX ADMIN — CLONIDINE HYDROCHLORIDE 0.3 MG: 0.1 TABLET ORAL at 05:07

## 2024-01-27 RX ADMIN — HEPARIN SODIUM 5000 UNITS: 5000 INJECTION INTRAVENOUS; SUBCUTANEOUS at 05:07

## 2024-01-27 RX ADMIN — LISINOPRIL 40 MG: 20 TABLET ORAL at 08:52

## 2024-01-27 RX ADMIN — HYDRALAZINE HYDROCHLORIDE 100 MG: 25 TABLET ORAL at 08:52

## 2024-01-27 RX ADMIN — METOCLOPRAMIDE 5 MG: 5 TABLET ORAL at 05:07

## 2024-01-27 RX ADMIN — ASPIRIN 81 MG: 81 TABLET, COATED ORAL at 08:52

## 2024-01-27 RX ADMIN — CINACALCET 60 MG: 30 TABLET, FILM COATED ORAL at 08:53

## 2024-01-27 RX ADMIN — ESCITALOPRAM OXALATE 20 MG: 20 TABLET ORAL at 08:52

## 2024-01-27 RX ADMIN — FAMOTIDINE 10 MG: 20 TABLET, FILM COATED ORAL at 08:52

## 2024-01-27 RX ADMIN — NIFEDIPINE 90 MG: 30 TABLET, FILM COATED, EXTENDED RELEASE ORAL at 08:52

## 2024-01-27 NOTE — PLAN OF CARE
Problem: Potential for Falls  Goal: Patient will remain free of falls  Description: INTERVENTIONS:  - Educate patient/family on patient safety including physical limitations  - Instruct patient to call for assistance with activity   - Consult OT/PT to assist with strengthening/mobility   - Keep Call bell within reach  - Keep bed low and locked with side rails adjusted as appropriate  - Keep care items and personal belongings within reach  - Initiate and maintain comfort rounds  - Make Fall Risk Sign visible to staff  - Offer Toileting every 2 Hours, in advance of need  - Initiate/Maintain bed alarm  - Obtain necessary fall risk management equipment: alarms  - Apply yellow socks and bracelet for high fall risk patients  - Consider moving patient to room near nurses station  Outcome: Adequate for Discharge     Problem: PAIN - ADULT  Goal: Verbalizes/displays adequate comfort level or baseline comfort level  Description: Interventions:  - Encourage patient to monitor pain and request assistance  - Assess pain using appropriate pain scale  - Administer analgesics based on type and severity of pain and evaluate response  - Implement non-pharmacological measures as appropriate and evaluate response  - Consider cultural and social influences on pain and pain management  - Notify physician/advanced practitioner if interventions unsuccessful or patient reports new pain  Outcome: Adequate for Discharge     Problem: INFECTION - ADULT  Goal: Absence or prevention of progression during hospitalization  Description: INTERVENTIONS:  - Assess and monitor for signs and symptoms of infection  - Monitor lab/diagnostic results  - Monitor all insertion sites, i.e. indwelling lines, tubes, and drains  - Monitor endotracheal if appropriate and nasal secretions for changes in amount and color  - [place] appropriate cooling/warming therapies per order  - Administer medications as ordered  - Instruct and encourage patient and family to  use good hand hygiene technique  - Identify and instruct in appropriate isolation precautions for identified infection/condition  Outcome: Adequate for Discharge     Problem: DISCHARGE PLANNING  Goal: Discharge to home or other facility with appropriate resources  Description: INTERVENTIONS:  - Identify barriers to discharge w/patient and caregiver  - Arrange for needed discharge resources and transportation as appropriate  - Identify discharge learning needs (meds, wound care, etc.)  - Arrange for interpretive services to assist at discharge as needed  - Refer to Case Management Department for coordinating discharge planning if the patient needs post-hospital services based on physician/advanced practitioner order or complex needs related to functional status, cognitive ability, or social support system  Outcome: Adequate for Discharge     Problem: Knowledge Deficit  Goal: Patient/family/caregiver demonstrates understanding of disease process, treatment plan, medications, and discharge instructions  Description: Complete learning assessment and assess knowledge base.  Interventions:  - Provide teaching at level of understanding  - Provide teaching via preferred learning methods  Outcome: Adequate for Discharge     Problem: METABOLIC, FLUID AND ELECTROLYTES - ADULT  Goal: Electrolytes maintained within normal limits  Description: INTERVENTIONS:  - Monitor labs and assess patient for signs and symptoms of electrolyte imbalances  - Administer electrolyte replacement as ordered  - Monitor response to electrolyte replacements, including repeat lab results as appropriate  - Instruct patient on fluid and nutrition as appropriate  Outcome: Adequate for Discharge  Goal: Fluid balance maintained  Description: INTERVENTIONS:  - Monitor labs   - Monitor I/O and WT  - Instruct patient on fluid and nutrition as appropriate  - Assess for signs & symptoms of volume excess or deficit  Outcome: Adequate for Discharge

## 2024-01-27 NOTE — ED ATTENDING ATTESTATION
1/25/2024  IDulce MD, saw and evaluated the patient. I have discussed the patient with the resident/non-physician practitioner and agree with the resident's/non-physician practitioner's findings, Plan of Care, and MDM as documented in the resident's/non-physician practitioner's note, except where noted. All available labs and Radiology studies were reviewed.  I was present for key portions of any procedure(s) performed by the resident/non-physician practitioner and I was immediately available to provide assistance.       At this point I agree with the current assessment done in the Emergency Department.  I have conducted an independent evaluation of this patient a history and physical is as follows:    38-year-old presented to the ER with abdominal pain.  Was recently discharged from the hospital for subarachnoid bleed.  Patient on dialysis, last full dialysis yesterday.  Hypertensive in the ER.  Denies chest pain or trouble breathing.  No new neurodeficits.    Agree with abdominal labs, scan, treat blood pressure.    Patient continues to be hypertensive with recent intracranial bleed.  Will bring for better control of blood pressure      ED Course         Critical Care Time  Procedures

## 2024-01-27 NOTE — PROGRESS NOTES
NEPHROLOGY PROGRESS NOTE   Mateus Mckeon 38 y.o. male MRN: [id number]  Unit/Bed#: S -01 Encounter: [id number]    ASSESSMENT & PLAN:     38-year-old male with a history of end-stage renal disease on hemodialysis, resistant hypertension who was recently discharged from [facility] who presents for nausea vomiting and abdominal pain.  Recent hospital admission at [facility] for acute CVA.  Nephrology consulted  for ESRD management.   End-stage renal disease on dialysis  -Outpatient unit: [facility]  -Schedule: Monday Wednesday Friday  -Access: Left arm AV fistula  -Plan: Status post hemodialysis treatment on January 26, 4 hours 2.5 L removal and postdialysis weight was 91 kg which is below the dry weight.  Will need to adjust her outpatient dry weight    Volume status  -Fluid overload: Ultrafiltration with HD today.  Outpatient dry weight 93 kg, last postdialysis weight was 91 kg.  Continue to challenge target weight    CKD/MBD  -Hyperphosphatemia: Continue PhosLo-2 tablet 3 times daily with meals.  As per Care Everywhere records patient is also on Velphoro 500 mg 3 times daily with meal which can be resumed after discharge  -Secondary hyperparathyroidism of renal origin continue Sensipar 60 mg by mouth daily    Blood pressure  -Resistant hypertension.  Recent CVA  -Blood pressure still elevated  -current medications-Clonidine 0.3 mg every 8 hours, hydralazine 100 mg 3 times daily, lisinopril 40 mg daily, labetalol 100 mg 3 times daily nifedipine 90 mg twice a day   -Due to elevated blood pressure, increase labetalol to 200 mg 3 times daily on January 26.  Blood pressure is still elevated so we will increase labetalol to 400 mg 3 times daily and monitor.     - continue IV as needed labetalol every 6 hours for systolic blood pressure more than 200    Electrolytes:   -Stable.  As per Care Everywhere record, patient is on Veltassa 5 mg on nondialysis days.  Plan for hemodialysis treatment  using only 2K bath    Anemia due to ESRD  -Goal hemoglobin:  10-11 grams/deciliter  -Current hemoglobin:   7.9 g/dL  -Plan: Not on HETAL due to CVA recently, hb below goal.  Continue to monitor    Abdominal pain/complain of nausea vomiting: Management per primary team, symptomatically improved  Recent CVA: Management per neurology  Chronic pruritus: On [place] as outpatient with HD    Discussed discussed with primary team plan for hemodialysis treatment on Monday, discussed about increasing the dose of labetalol and agreed with the plan    SUBJECTIVE:  Patient feeling better, headache resolved    OBJECTIVE:  Current Weight: Weight - Scale: 91.3 kg (201 lb 4.5 oz)  Vitals:    01/27/24 0700   BP: (!) 187/87   Pulse: 69   Resp:    Temp: 97.9 °F (36.6 °C)   SpO2: 100%       Intake/Output Summary (Last 24 hours) at 1/27/2024 1052  Last data filed at 1/26/2024 1925  Gross per 24 hour   Intake 470 ml   Output 3000 ml   Net -2530 ml       Physical Exam  General:  Ill looking, awake.  Eyes: Conjunctivae pink,  Sclera anicteric  ENT: lips and mucous membranes moist  Neck: supple   Chest: Clear to Auscultation both lungs,  no crackles, ronchus or wheezing.  CVS: S1 & S2 present, normal rate, regular rhythm, no murmur.  Abdomen: soft, non-tender, non-distended, Bowel sounds normoactive  Extremities: no edema of  legs  Skin: no rash  Neuro: awake, alert, oriented x 3   Psych: Mood and affect appropriate    Medications:    Current Facility-Administered Medications:     aspirin (ECOTRIN LOW STRENGTH) EC tablet 81 mg, 81 mg, Oral, Daily, Tomás Harris DO, 81 mg at 01/27/24 0852    atorvastatin (LIPITOR) tablet 40 mg, 40 mg, Oral, QPM, Tomás Harris, DO, 40 mg at 01/25/24 2353    calcium acetate (PHOSLO) capsule 1,334 mg, 1,334 mg, Oral, TID, Arturo Velazquez MD, 1,334 mg at 01/27/24 0853    calcium carbonate (TUMS) chewable tablet 1,000 mg, 1,000 mg, Oral, Daily PRN, Tomás Harris DO    cinacalcet (SENSIPAR) tablet 60 mg, 60 mg, Oral,  Daily, Tomás Harris, DO, 60 mg at 01/27/24 0853    cloNIDine (CATAPRES) tablet 0.3 mg, 0.3 mg, Oral, Q8H HALIE, Tomás Harris, DO, 0.3 mg at 01/27/24 0507    escitalopram (LEXAPRO) tablet 20 mg, 20 mg, Oral, Daily, Tomás Harris, DO, 20 mg at 01/27/24 0852    famotidine (PEPCID) tablet 10 mg, 10 mg, Oral, QAM, Tomás Harris, DO, 10 mg at 01/27/24 0852    gabapentin (NEURONTIN) capsule 200 mg, 200 mg, Oral, HS, Tomás Harris, DO, 200 mg at 01/26/24 2119    heparin (porcine) subcutaneous injection 5,000 Units, 5,000 Units, Subcutaneous, Q8H HALIE, Tomás Harris, DO, 5,000 Units at 01/27/24 0507    hydrALAZINE (APRESOLINE) tablet 100 mg, 100 mg, Oral, TID, Tomás Harris, DO, 100 mg at 01/27/24 0852    HYDROmorphone (DILAUDID) injection 0.5 mg, 0.5 mg, Intravenous, Q3H PRN, Tomás Harris, DO    hydrOXYzine HCL (ATARAX) tablet 10 mg, 10 mg, Oral, Q6H PRN, Tomás Harris, DO, 10 mg at 01/26/24 0918    insulin aspart (NovoLOG) FOR PUMP REFILLS 50 Units, 50 Units, Subcutaneous Insulin Pump, Daily PRN, Analy Salinas MD    labetalol (NORMODYNE) injection 10 mg, 10 mg, Intravenous, Q6H PRN, Tomás Harris, DO, 10 mg at 01/25/24 2126    labetalol (NORMODYNE) tablet 200 mg, 200 mg, Oral, TID, Arturo Velazquez MD, 200 mg at 01/27/24 0507    lisinopril (ZESTRIL) tablet 40 mg, 40 mg, Oral, Daily, Tomás Harris, DO, 40 mg at 01/27/24 0852    metoclopramide (REGLAN) tablet 5 mg, 5 mg, Oral, 4x Daily (AC & HS), Sudarshan Milan Cha, MD, 5 mg at 01/27/24 0507    multivitamin stress formula tablet 1 tablet, 1 tablet, Oral, Daily Before Lunch, Tomás Harris DO    NIFEdipine (PROCARDIA XL) 24 hr tablet 90 mg, 90 mg, Oral, BID, Tomás Harris DO, 90 mg at 01/27/24 0852    oxyCODONE (ROXICODONE) split tablet 2.5 mg, 2.5 mg, Oral, Q4H PRN **OR** oxyCODONE (ROXICODONE) IR tablet 5 mg, 5 mg, Oral, Q4H PRN, Tomás Harris DO    pantoprazole (PROTONIX) injection 40 mg, 40 mg, Intravenous, Q24H HALIE, Tomás Harris DO, 40 mg at 01/27/24 0852    PATIENT MAINTAINED INSULIN  "PUMP 1 each, 1 each, Subcutaneous, Q8H, Analy Salinas MD, 1 each at 01/27/24 0222    Patiromer Sorbitex Calcium PACK 5 g, 5 g, Oral, Weekly, Tomás Harris, DO    traZODone (DESYREL) tablet 25 mg, 25 mg, Oral, HS PRN, Tomás Harris,     Invasive Devices:        Lab Results:   Results from last 7 days   Lab Units 01/27/24  0711 01/25/24  1706 01/23/24  0608   WBC Thousand/uL 4.76 6.38 5.84   HEMOGLOBIN g/dL 7.9* 8.4* 7.9*   HEMATOCRIT % 24.5* 25.9* 25.3*   PLATELETS Thousands/uL 209 234 255   POTASSIUM mmol/L 4.5 4.6 5.1   CHLORIDE mmol/L 97 95* 100   CO2 mmol/L 33* 35* 27   BUN mg/dL 17 27* 35*   CREATININE mg/dL 6.05* 8.01* 8.12*   CALCIUM mg/dL 8.5 9.0 7.8*   ALK PHOS U/L 73 72 69   ALT U/L 21 22 12   AST U/L 25 28 19       Previous work up:         Portions of the record may have been created with voice recognition software. Occasional wrong word or \"sound a like\" substitutions may have occurred due to the inherent limitations of voice recognition software. Read the chart carefully and recognize, using context, where substitutions have occurred.If you have any questions, please contact the dictating provider.   "

## 2024-01-27 NOTE — PLAN OF CARE
Problem: Potential for Falls  Goal: Patient will remain free of falls  Description: INTERVENTIONS:  - Educate patient/family on patient safety including physical limitations  - Instruct patient to call for assistance with activity   - Consult OT/PT to assist with strengthening/mobility   - Keep Call bell within reach  - Keep bed low and locked with side rails adjusted as appropriate  - Keep care items and personal belongings within reach  - Initiate and maintain comfort rounds  - Make Fall Risk Sign visible to staff  - Offer Toileting every 2 Hours, in advance of need  - Initiate/Maintain bed alarm  - Obtain necessary fall risk management equipment: alarms  - Apply yellow socks and bracelet for high fall risk patients  - Consider moving patient to room near nurses station  Outcome: Progressing     Problem: PAIN - ADULT  Goal: Verbalizes/displays adequate comfort level or baseline comfort level  Description: Interventions:  - Encourage patient to monitor pain and request assistance  - Assess pain using appropriate pain scale  - Administer analgesics based on type and severity of pain and evaluate response  - Implement non-pharmacological measures as appropriate and evaluate response  - Consider cultural and social influences on pain and pain management  - Notify physician/advanced practitioner if interventions unsuccessful or patient reports new pain  Outcome: Progressing     Problem: INFECTION - ADULT  Goal: Absence or prevention of progression during hospitalization  Description: INTERVENTIONS:  - Assess and monitor for signs and symptoms of infection  - Monitor lab/diagnostic results  - Monitor all insertion sites, i.e. indwelling lines, tubes, and drains  - Monitor endotracheal if appropriate and nasal secretions for changes in amount and color  - [place] appropriate cooling/warming therapies per order  - Administer medications as ordered  - Instruct and encourage patient and family to use good hand hygiene  technique  - Identify and instruct in appropriate isolation precautions for identified infection/condition  Outcome: Progressing     Problem: DISCHARGE PLANNING  Goal: Discharge to home or other facility with appropriate resources  Description: INTERVENTIONS:  - Identify barriers to discharge w/patient and caregiver  - Arrange for needed discharge resources and transportation as appropriate  - Identify discharge learning needs (meds, wound care, etc.)  - Arrange for interpretive services to assist at discharge as needed  - Refer to Case Management Department for coordinating discharge planning if the patient needs post-hospital services based on physician/advanced practitioner order or complex needs related to functional status, cognitive ability, or social support system  Outcome: Progressing     Problem: Knowledge Deficit  Goal: Patient/family/caregiver demonstrates understanding of disease process, treatment plan, medications, and discharge instructions  Description: Complete learning assessment and assess knowledge base.  Interventions:  - Provide teaching at level of understanding  - Provide teaching via preferred learning methods  Outcome: Progressing     Problem: METABOLIC, FLUID AND ELECTROLYTES - ADULT  Goal: Electrolytes maintained within normal limits  Description: INTERVENTIONS:  - Monitor labs and assess patient for signs and symptoms of electrolyte imbalances  - Administer electrolyte replacement as ordered  - Monitor response to electrolyte replacements, including repeat lab results as appropriate  - Instruct patient on fluid and nutrition as appropriate  Outcome: Progressing  Goal: Fluid balance maintained  Description: INTERVENTIONS:  - Monitor labs   - Monitor I/O and WT  - Instruct patient on fluid and nutrition as appropriate  - Assess for signs & symptoms of volume excess or deficit  Outcome: Progressing

## 2024-01-27 NOTE — DISCHARGE INSTR - AVS FIRST PAGE
Dear Mateus Mckeon,     It was our pleasure to care for you here at [facility].  It is our hope that we were always able to exceed the expected standards for your care during your stay.  You were hospitalized due to abdominal pain.  You were cared for on the third floor by Yaneli Phillips MD under the service of Shayla Acevedo MD with the [facility] who covers for your primary care physician (PCP), Dania Sher DO, while you were hospitalized.  If you have any questions or concerns related to this hospitalization, you may contact us at .  For follow up as well as any medication refills, we recommend that you follow up with your primary care physician.  A registered nurse will reach out to you by phone within a few days after your discharge to answer any additional questions that you may have after going home.  However, at this time we provide for you here, the most important instructions / recommendations at discharge:       Notable Medication Adjustments -   Please take Reglan three times a day as needed with meals for nausea. Do not take at the same time as Zofran. Take one or the other.   Please increase your Labetalol to 2 tablets (400 mg) three times a day.     Testing Required after Discharge -   None    Important follow up information -   Please follow up with your PCP outpatient.   Please follow up with your Endocrinologist outpatient.   Please follow up with your Nephrologist outpatient. Please continue your dialysis sessions as scheduled.     Other Instructions -   Wishing you all the best!     Please review this entire after visit summary as additional general instructions including medication list, appointments, activity, diet, any pertinent wound care, and other additional recommendations from your care team that may be provided for you.      Sincerely,     Yaneli Phillips MD

## 2024-01-27 NOTE — DISCHARGE SUMMARY
[facility]  Discharge- Mateus Mckeon 1985, 38 y.o. male MRN: [id number]  Unit/Bed#: S -[facility] Encounter: [id number]  Primary Care Provider: Dania Sher DO   Date and time admitted to hospital: 1/25/2024  4:17 PM    * Abdominal pain, acute, epigastric  Assessment & Plan  Pt presents w/ epigastric pain b/l exquisitely tender to light palpation & diffuse to lower quadrants of relatively sudden onset a couple hours after completing routine & uncomplicated HD for longstanding ESRD day prior to presenting to ER  Since then pt has had nausea & vomiting x 8+ episodes NBNB, no particular remedy had helped until ER  Uncontrolled htn in setting of acute discomfort aforementioned & w/ chronic difficulty to maintain normotensive status, max of 210/110  Labetolol 10 x 3 doses have had mild effect on bp, zofran has adequately addressed nausea per pt, dilaudid helped pain somewhat  Other VSS, CBC only shows chronic anemia unchanged, CMP consistent w/ ESRD, Lipase & Lac negative, CTAP negative new finding  Ddx includes bowel ischemia, obstruction, colitis, gastritis, infection, thus far uncertain w/ unremarkable workup results    Abdominal pain resolved on morning of admission after being treated with supportive care and home medications.     Plan:   Follow up with PCP outpatient     Hyperlipidemia  Assessment & Plan  Patient continued on PTA atorvastatin 40mg during admission and at discharge.     Plan:   Follow up with PCP outpatient.     GERD (gastroesophageal reflux disease)  Assessment & Plan  Patient has hx of chronic GERD. Maintained on PTA pepcid during admission.     Plan:   Follow up with PCP outpatient.     End stage kidney disease (HCC)  Assessment & Plan  Lab Results   Component Value Date    EGFR 10 01/27/2024    EGFR 7 01/25/2024    EGFR 7 01/23/2024    CREATININE 6.05 (H) 01/27/2024    CREATININE 8.01 (H) 01/25/2024    CREATININE 8.12 (H) 01/23/2024     Patient continued  on MWF dialysis schedule during admission.   Last dialysis on 1/26.     Plan:   Follow up with Nephrology outpatient.   Continue MWF outpatient dialysis.     Anemia in chronic kidney disease, on chronic dialysis (HCC)  Assessment & Plan  Recent Labs     01/27/24  0711   HGB 7.9*     Patient has hx of anemia of chronic kidney disease and receives Epo outpatient.   Patient at baseline during admission.     Plan:   Follow up with Nephrology outpatient.     Hypertensive urgency  Assessment & Plan  Presents w/ HTN to max 220/110, chronic issue w/ pt ESRD on HD, but acutely worsened in setting of nausea / vomiting / abd pains. Nephrology saw patient during admission and increased PTA labetalol to 400mg TID.     Plan:   Follow up with Nephrology outpatient.     Type 1 diabetes mellitus (HCC)  Assessment & Plan  Lab Results   Component Value Date    HGBA1C 6.0 (H) 01/14/2024       Recent Labs     01/27/24  0729 01/27/24  1114   POCGLU 133 165*       Blood Sugar Average: Last 72 hrs:  (P) 153    Endocrinology consulted during admission. Patient maintained on insulin pump during admission.     Plan:   Follow up with Endocrinology outpatient.         Medical Problems       Resolved Problems  Date Reviewed: 1/27/2024   None       Discharging Physician / Practitioner: Yaneli Phillips MD  PCP: Dania Sher DO  Admission Date:   Admission Orders (From admission, onward)       Ordered        01/26/24 0919  Inpatient Admission  Once            01/25/24 2051  Place in Observation  Once                          Discharge Date: 1/27/24    Consultations During Hospital Stay:  Nephrology  Endocrinology    Procedures Performed:   Hemodialysis on 1/26    Significant Findings / Test Results:   CT Abdomen/Pelvis demonstrated trace pleural and abdominal fluid and soft tissue edema likely related to patient's volume status. No specific findings to explain abdominal pain and vomiting.     Incidental Findings:   None    Test Results  "Pending at Discharge (will require follow up):   None     Outpatient Tests Requested:  None    Complications:  None    Reason for Admission: acute epigastric abdominal pain     HPI: \"Mateus Mckeon is a 38 y.o. male with a PMH of recent SAH, recent CVA including multiple vessel territories, T1DM, ESRD on HD for several years, who presents with severe abd pains located predominantly in the epigastric region b/l that has waxed & waned since its onset a couple hours after most recent HD tx yesterday, w/ associated nausea & several episodes of NBNB vomiting thus far. Related poor oral intake, w/ medications attempted but sometimes followed by vomiting. ER has given dilaudid & zofran for pain & nausea to mild & moderate relief respectively. VSS excepting BP remaining around 200/95 despite labetalol 10 mg x 3 doses in ER, now restarting combination of home dose htn meds w/ new nephro recs placed in their note of 01/25. CBC shwos stable chronic anemia, CMP consistent w/ ESRD, Lipase & Lac negative. CTAP unrevealing, leaving the ddx still open for pt sx as noted above.\"     Hospital Course:   Mateus Mckeon is a 38 y.o. male patient who originally presented to the hospital on 1/25/2024 due to severe intractable epigastric abdominal pain. Imaging was unremarkable and COVID test was negative, after patient endorsed known exposure. Lactic acid and lipase were both negative. He was treated with supportive care and received hemodialysis during admission. His abdominal pain, nausea and vomiting resolved completely and he tolerated a diet well. He was discharged to home in good condition.     Please see above list of diagnoses and related plan for additional information.     Condition at Discharge: good    Discharge Day Visit / Exam:   Subjective:    Patient seen and examined at bedside this morning. Patient reports his abdominal pain has completely resolved. He endorses a normal appetite and eating well     Overnight, " "no acute events reported by night team.      Nursing reports no concerns.      Vitals:   Blood Pressure: (!) 187/87 (01/27/24 0700)  Pulse: 69 (01/27/24 0700)  Temperature: 97.9 °F (36.6 °C) (01/27/24 0700)  Temp Source: Oral (01/27/24 0700)  Respirations: 18 (01/26/24 2115)  Height: 5' 5\" (165.1 cm) (01/25/24 2315)  Weight - Scale: 91.3 kg (201 lb 4.5 oz) (01/27/24 0600)  SpO2: 100 % (01/27/24 0700)    Exam:   Physical Exam  Vitals and nursing note reviewed.   Constitutional:       General: He is not in acute distress.     Appearance: Normal appearance. He is not ill-appearing, toxic-appearing or diaphoretic.   HENT:      Head: Normocephalic and atraumatic.      Right Ear: External ear normal.      Left Ear: External ear normal.      Nose: Nose normal. No congestion or rhinorrhea.      Mouth/Throat:      Mouth: Mucous membranes are moist.   Eyes:      General: No scleral icterus.        Right eye: No discharge.         Left eye: No discharge.      Conjunctiva/sclera: Conjunctivae normal.   Cardiovascular:      Rate and Rhythm: Normal rate and regular rhythm.      Heart sounds: Normal heart sounds. No murmur heard.     No friction rub. No gallop.   Pulmonary:      Effort: Pulmonary effort is normal. No respiratory distress.      Breath sounds: Normal breath sounds. No stridor. No wheezing, rhonchi or rales.   Chest:      Chest wall: No tenderness.   Abdominal:      General: Bowel sounds are normal. There is no distension.      Palpations: Abdomen is soft.      Tenderness: There is no abdominal tenderness. There is no guarding.   Musculoskeletal:         General: No swelling or deformity.      Right lower leg: No edema.      Left lower leg: No edema.   Skin:     General: Skin is warm and dry.      Capillary Refill: Capillary refill takes less than 2 seconds.      Coloration: Skin is not jaundiced or pale.      Findings: No bruising, erythema, lesion or rash.   Neurological:      Mental Status: He is alert and " oriented to person, place, and time. Mental status is at baseline.   Psychiatric:         Mood and Affect: Mood normal.         Behavior: Behavior normal.          Discussion with Family: Updated  (father) at bedside.    Discharge instructions/Information to patient and family:   See after visit summary for information provided to patient and family.      Provisions for Follow-Up Care:  See after visit summary for information related to follow-up care and any pertinent home health orders.      Mobility at time of Discharge:   Basic Mobility Inpatient Raw Score: 24  JH-HLM Goal: 8: Walk 250 feet or more  JH-HLM Achieved: 8: Walk 250 feet ot more  HLM Goal achieved. Continue to encourage appropriate mobility.     Disposition:   Home    Planned Readmission: None     Discharge Statement:  I spent 45 minutes discharging the patient. This time was spent on the day of discharge. I had direct contact with the patient on the day of discharge. Greater than 50% of the total time was spent examining patient, answering all patient questions, arranging and discussing plan of care with patient as well as directly providing post-discharge instructions.  Additional time then spent on discharge activities.    Discharge Medications:  See after visit summary for reconciled discharge medications provided to patient and/or family.      **Please Note: This note may have been constructed using a voice recognition system**

## 2024-01-27 NOTE — PLAN OF CARE
Problem: Potential for Falls  Goal: Patient will remain free of falls  Description: INTERVENTIONS:  - Educate patient/family on patient safety including physical limitations  - Instruct patient to call for assistance with activity   - Consult OT/PT to assist with strengthening/mobility   - Keep Call bell within reach  - Keep bed low and locked with side rails adjusted as appropriate  - Keep care items and personal belongings within reach  - Initiate and maintain comfort rounds  - Make Fall Risk Sign visible to staff  - Offer Toileting every 2 Hours, in advance of need  - Initiate/Maintain bed alarm  - Obtain necessary fall risk management equipment: alarms  - Apply yellow socks and bracelet for high fall risk patients  - Consider moving patient to room near nurses station  Outcome: Progressing     Problem: PAIN - ADULT  Goal: Verbalizes/displays adequate comfort level or baseline comfort level  Description: Interventions:  - Encourage patient to monitor pain and request assistance  - Assess pain using appropriate pain scale  - Administer analgesics based on type and severity of pain and evaluate response  - Implement non-pharmacological measures as appropriate and evaluate response  - Consider cultural and social influences on pain and pain management  - Notify physician/advanced practitioner if interventions unsuccessful or patient reports new pain  Outcome: Progressing     Problem: INFECTION - ADULT  Goal: Absence or prevention of progression during hospitalization  Description: INTERVENTIONS:  - Assess and monitor for signs and symptoms of infection  - Monitor lab/diagnostic results  - Monitor all insertion sites, i.e. indwelling lines, tubes, and drains  - Monitor endotracheal if appropriate and nasal secretions for changes in amount and color  - [place] appropriate cooling/warming therapies per order  - Administer medications as ordered  - Instruct and encourage patient and family to use good hand hygiene  technique  - Identify and instruct in appropriate isolation precautions for identified infection/condition  Outcome: Progressing     Problem: DISCHARGE PLANNING  Goal: Discharge to home or other facility with appropriate resources  Description: INTERVENTIONS:  - Identify barriers to discharge w/patient and caregiver  - Arrange for needed discharge resources and transportation as appropriate  - Identify discharge learning needs (meds, wound care, etc.)  - Arrange for interpretive services to assist at discharge as needed  - Refer to Case Management Department for coordinating discharge planning if the patient needs post-hospital services based on physician/advanced practitioner order or complex needs related to functional status, cognitive ability, or social support system  Outcome: Progressing     Problem: Knowledge Deficit  Goal: Patient/family/caregiver demonstrates understanding of disease process, treatment plan, medications, and discharge instructions  Description: Complete learning assessment and assess knowledge base.  Interventions:  - Provide teaching at level of understanding  - Provide teaching via preferred learning methods  Outcome: Progressing     Problem: METABOLIC, FLUID AND ELECTROLYTES - ADULT  Goal: Electrolytes maintained within normal limits  Description: INTERVENTIONS:  - Monitor labs and assess patient for signs and symptoms of electrolyte imbalances  - Administer electrolyte replacement as ordered  - Monitor response to electrolyte replacements, including repeat lab results as appropriate  - Instruct patient on fluid and nutrition as appropriate  Outcome: Progressing  Goal: Fluid balance maintained  Description: INTERVENTIONS:  - Monitor labs   - Monitor I/O and WT  - Instruct patient on fluid and nutrition as appropriate  - Assess for signs & symptoms of volume excess or deficit  Outcome: Progressing

## 2024-01-29 ENCOUNTER — TELEPHONE (OUTPATIENT)
Dept: CARDIOLOGY CLINIC | Facility: CLINIC | Age: 39
End: 2024-01-29

## 2024-01-29 ENCOUNTER — APPOINTMENT (EMERGENCY)
Dept: RADIOLOGY | Facility: HOSPITAL | Age: 39
End: 2024-01-29
Payer: MEDICARE

## 2024-01-29 ENCOUNTER — HOSPITAL ENCOUNTER (EMERGENCY)
Facility: HOSPITAL | Age: 39
Discharge: HOME | End: 2024-01-29
Attending: EMERGENCY MEDICINE
Payer: MEDICARE

## 2024-01-29 VITALS
OXYGEN SATURATION: 95 % | RESPIRATION RATE: 18 BRPM | DIASTOLIC BLOOD PRESSURE: 93 MMHG | HEART RATE: 79 BPM | TEMPERATURE: 98.3 F | SYSTOLIC BLOOD PRESSURE: 193 MMHG

## 2024-01-29 DIAGNOSIS — R07.89 CHEST TIGHTNESS: ICD-10-CM

## 2024-01-29 DIAGNOSIS — R11.2 NAUSEA AND VOMITING: ICD-10-CM

## 2024-01-29 DIAGNOSIS — I10 HYPERTENSION: Primary | ICD-10-CM

## 2024-01-29 LAB
2HR DELTA HS TROPONIN: -4 NG/L
ALBUMIN SERPL BCP-MCNC: 4 G/DL (ref 3.5–5)
ALP SERPL-CCNC: 78 U/L (ref 34–104)
ALT SERPL W P-5'-P-CCNC: 18 U/L (ref 7–52)
ANION GAP SERPL CALCULATED.3IONS-SCNC: 12 MMOL/L
AST SERPL W P-5'-P-CCNC: 20 U/L (ref 13–39)
ATRIAL RATE: 78 BPM
BASOPHILS # BLD AUTO: 0.05 THOUSANDS/ÂΜL (ref 0–0.1)
BASOPHILS NFR BLD AUTO: 1 % (ref 0–1)
BILIRUB SERPL-MCNC: 0.55 MG/DL (ref 0.2–1)
BUN SERPL-MCNC: 37 MG/DL (ref 5–25)
CALCIUM SERPL-MCNC: 8.3 MG/DL (ref 8.4–10.2)
CARDIAC TROPONIN I PNL SERPL HS: 17 NG/L
CARDIAC TROPONIN I PNL SERPL HS: 21 NG/L
CHLORIDE SERPL-SCNC: 97 MMOL/L (ref 96–108)
CO2 SERPL-SCNC: 29 MMOL/L (ref 21–32)
CREAT SERPL-MCNC: 10.07 MG/DL (ref 0.6–1.3)
EOSINOPHIL # BLD AUTO: 0.31 THOUSAND/ÂΜL (ref 0–0.61)
EOSINOPHIL NFR BLD AUTO: 6 % (ref 0–6)
ERYTHROCYTE [DISTWIDTH] IN BLOOD BY AUTOMATED COUNT: 14.4 % (ref 11.6–15.1)
GFR SERPL CREATININE-BSD FRML MDRD: 5 ML/MIN/1.73SQ M
GLUCOSE SERPL-MCNC: 107 MG/DL (ref 65–140)
GLUCOSE SERPL-MCNC: 114 MG/DL (ref 65–140)
GLUCOSE SERPL-MCNC: 68 MG/DL (ref 65–140)
HCT VFR BLD AUTO: 25 % (ref 36.5–49.3)
HGB BLD-MCNC: 8.1 G/DL (ref 12–17)
IMM GRANULOCYTES # BLD AUTO: 0.02 THOUSAND/UL (ref 0–0.2)
IMM GRANULOCYTES NFR BLD AUTO: 0 % (ref 0–2)
LIPASE SERPL-CCNC: 96 U/L (ref 11–82)
LYMPHOCYTES # BLD AUTO: 1.29 THOUSANDS/ÂΜL (ref 0.6–4.47)
LYMPHOCYTES NFR BLD AUTO: 23 % (ref 14–44)
MCH RBC QN AUTO: 30.9 PG (ref 26.8–34.3)
MCHC RBC AUTO-ENTMCNC: 32.4 G/DL (ref 31.4–37.4)
MCV RBC AUTO: 95 FL (ref 82–98)
MONOCYTES # BLD AUTO: 0.49 THOUSAND/ÂΜL (ref 0.17–1.22)
MONOCYTES NFR BLD AUTO: 9 % (ref 4–12)
NEUTROPHILS # BLD AUTO: 3.41 THOUSANDS/ÂΜL (ref 1.85–7.62)
NEUTS SEG NFR BLD AUTO: 61 % (ref 43–75)
NRBC BLD AUTO-RTO: 0 /100 WBCS
P AXIS: 69 DEGREES
PLATELET # BLD AUTO: 204 THOUSANDS/UL (ref 149–390)
PMV BLD AUTO: 10.1 FL (ref 8.9–12.7)
POTASSIUM SERPL-SCNC: 4.8 MMOL/L (ref 3.5–5.3)
PR INTERVAL: 184 MS
PROT SERPL-MCNC: 6.2 G/DL (ref 6.4–8.4)
QRS AXIS: 69 DEGREES
QRSD INTERVAL: 72 MS
QT INTERVAL: 416 MS
QTC INTERVAL: 474 MS
RBC # BLD AUTO: 2.62 MILLION/UL (ref 3.88–5.62)
SODIUM SERPL-SCNC: 138 MMOL/L (ref 135–147)
T WAVE AXIS: 69 DEGREES
VENTRICULAR RATE: 78 BPM
WBC # BLD AUTO: 5.57 THOUSAND/UL (ref 4.31–10.16)

## 2024-01-29 PROCEDURE — 85025 COMPLETE CBC W/AUTO DIFF WBC: CPT

## 2024-01-29 PROCEDURE — 99285 EMERGENCY DEPT VISIT HI MDM: CPT | Performed by: EMERGENCY MEDICINE

## 2024-01-29 PROCEDURE — 82948 REAGENT STRIP/BLOOD GLUCOSE: CPT

## 2024-01-29 PROCEDURE — 93005 ELECTROCARDIOGRAM TRACING: CPT

## 2024-01-29 PROCEDURE — 83690 ASSAY OF LIPASE: CPT

## 2024-01-29 PROCEDURE — 80053 COMPREHEN METABOLIC PANEL: CPT

## 2024-01-29 PROCEDURE — 99285 EMERGENCY DEPT VISIT HI MDM: CPT

## 2024-01-29 PROCEDURE — 84484 ASSAY OF TROPONIN QUANT: CPT

## 2024-01-29 PROCEDURE — 71045 X-RAY EXAM CHEST 1 VIEW: CPT

## 2024-01-29 PROCEDURE — 36415 COLL VENOUS BLD VENIPUNCTURE: CPT

## 2024-01-29 PROCEDURE — 96374 THER/PROPH/DIAG INJ IV PUSH: CPT

## 2024-01-29 RX ORDER — ONDANSETRON 2 MG/ML
4 INJECTION INTRAMUSCULAR; INTRAVENOUS ONCE
Status: COMPLETED | OUTPATIENT
Start: 2024-01-29 | End: 2024-01-29

## 2024-01-29 RX ORDER — ASPIRIN 81 MG/1
162 TABLET, CHEWABLE ORAL ONCE
Status: COMPLETED | OUTPATIENT
Start: 2024-01-29 | End: 2024-01-29

## 2024-01-29 RX ADMIN — ONDANSETRON 4 MG: 2 INJECTION INTRAMUSCULAR; INTRAVENOUS at 01:33

## 2024-01-29 RX ADMIN — NITROGLYCERIN 0.5 INCH: 20 OINTMENT TOPICAL at 01:33

## 2024-01-29 RX ADMIN — ASPIRIN 81 MG CHEWABLE TABLET 162 MG: 81 TABLET CHEWABLE at 01:33

## 2024-01-29 NOTE — DISCHARGE INSTRUCTIONS
Follow-up with your PCP, go to your dialysis today, continue to take your medications as prescribed, return to the ED for any new or worsening symptoms.

## 2024-01-29 NOTE — ED PROVIDER NOTES
History  Chief Complaint   Patient presents with    Vomiting     Pt states he had recent discharge from the hospital for a stroke. States since he has been having issues controlling his BP. Tonight states began with nausea and CP.    Chest Pain     Bill is a 38 year old male with history of diabetes, hyperlipidemia, hypertension, ESRD on HD, depression/anxiety, recent diagnosis of acute subarachnoid hemorrhage/acute cerebral infarcts of unknown origin for which he was hospitalized and discharged on 1/23, presenting for evaluation of continued elevated blood pressures, associated chest tightness and nausea.  Of note, patient was admitted again in the hospital on 1/25 for epigastric abdominal pain and vomiting, possible gastroparesis, he was discharged 1.5 days ago.  Today, the patient began with a chest tightness sensation, he was also nauseous and had several episodes of vomiting.  He complains that he still has mild chest tightness and nausea though the symptoms have improved.  Throughout the day, his blood pressures were elevated with systolic blood pressure greater than 200 despite taking his prescribed medications including nifedipine 90 mg, labetalol 400 mg, and 100 mg of hydralazine.  States that he took these medications several hours prior to arrival.  Per chart review, the patient had renal vascular study completed which was unremarkable.  He also had CT of the abdomen pelvis completed recently which did not show any abnormalities, no evidence of pheochromocytoma.  Unclear why his blood pressures remained so severely elevated.      History provided by:  Patient and medical records   used: No    Vomiting  Associated symptoms: no abdominal pain, no arthralgias, no chills, no cough, no fever and no sore throat    Chest Pain  Associated symptoms: nausea and vomiting    Associated symptoms: no abdominal pain, no back pain, no cough, no fever, no palpitations and no shortness of breath         Prior to Admission Medications   Prescriptions Last Dose Informant Patient Reported? Taking?   B-D ULTRAFINE III SHORT PEN 31G X 8 MM MISC  Self No No   Sig: USE 1 PEN NEEDLE 8 TIMES DAILY   GLUCAGON EMERGENCY 1 MG injection  Self Yes No   Gvoke HypoPen 1-Pack 1 MG/0.2ML SOAJ  Self Yes No   Insulin Disposable Pump (Omnipod 5 G6 Intro, Gen 5,) KIT  Self Yes No   Insulin Disposable Pump (Omnipod 5 G6 Pod, Gen 5,) MISC  Self Yes No   NIFEdipine (PROCARDIA XL) 90 mg 24 hr tablet   No No   Sig: Take 1 tablet (90 mg total) by mouth 2 (two) times a day   NovoLOG 100 UNIT/ML injection  Self Yes No   Patiromer Sorbitex Calcium (VELTASSA PO)  Self Yes No   Sig: Take 5 g by mouth once a week   SPS 15 GM/60ML suspension  Self Yes No   Sig: TAKE 60 ML BY MOUTH SINGLE DOSE FOR EMERGENCY USE DURING MISSED HEMODIALYSIS   aspirin (ECOTRIN LOW STRENGTH) 81 mg EC tablet  Self Yes No   Sig: Take 81 mg by mouth daily   atorvastatin (LIPITOR) 40 mg tablet   No No   Sig: Take 1 tablet (40 mg total) by mouth every evening   b complex vitamins capsule  Self Yes No   Sig: Take 1 capsule by mouth daily before lunch    calcium acetate (PHOSLO) capsule   No No   Sig: Take 1 capsule (667 mg total) by mouth 3 (three) times a day   cinacalcet (SENSIPAR) 60 MG tablet   No No   Sig: Take 1 tablet (60 mg total) by mouth daily   cloNIDine (CATAPRES-TTS-3) 0.3 mg/24 hr  Self Yes No   Si patch once a week   escitalopram (LEXAPRO) 20 mg tablet   No No   Sig: Take 1 tablet (20 mg total) by mouth daily   famotidine (PEPCID) 40 MG tablet   No No   Sig: TAKE 1 TABLET BY MOUTH IN THE MORNING   gabapentin (NEURONTIN) 100 mg capsule   No No   Sig: TAKE 2 CAPSULES BY MOUTH AT BEDTIME   hydrALAZINE (APRESOLINE) 100 MG tablet   No No   Sig: Take 1 tablet (100 mg total) by mouth 3 (three) times a day   hydrOXYzine HCL (ATARAX) 10 mg tablet  Self No No   Sig: Take 1 tablet (10 mg total) by mouth every 6 (six) hours as needed for itching or anxiety    labetalol (NORMODYNE) 200 mg tablet   No No   Sig: Take 2 tablets (400 mg total) by mouth 3 (three) times a day   lisinopril (ZESTRIL) 40 mg tablet   No No   Sig: Take 1 tablet (40 mg total) by mouth daily   metoclopramide (REGLAN) 5 mg tablet   No No   Sig: Take 1 tablet (5 mg total) by mouth 3 (three) times a day as needed (nausea)   ondansetron (ZOFRAN) 4 mg tablet  Self No No   Sig: Take 1 tablet (4 mg total) by mouth every 8 (eight) hours as needed for nausea or vomiting   saccharomyces boulardii (FLORASTOR) 250 mg capsule  Self Yes No   Sig: Take 250 mg by mouth daily   traZODone (DESYREL) 50 mg tablet  Self No No   Sig: Take 0.5 tablets (25 mg total) by mouth daily at bedtime as needed for sleep   triamcinolone (KENALOG) 0.1 % ointment  Self No No   Sig: Apply topically 2 (two) times a day For up to 14 days on the itchy areas. Avoid groin, underarms and face. It is important to take at least 1 week break between uses.      Facility-Administered Medications: None       Past Medical History:   Diagnosis Date    Acute kidney injury (HCC)     Ambulates with cane     Anuria     Anxiety     Cellulitis of right elbow 03/31/2021    Chronic kidney disease     Depression     Diabetes mellitus (HCC)     Diarrhea     Emesis 10/24/2020    End stage renal disease (HCC) 02/11/2018    Formatting of this note might be different from the original. Last Assessment & Plan:  Secondary to DM.  On nightly PD.  Followed by Nephro.  [facility] Formatting of this note might be different from the original. Last Assessment & Plan:  Formatting of this note might be different from the original. 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    Esophagitis 07/21/2015    Falls     Gastroparesis     GERD (gastroesophageal reflux disease)     History of shingles 2010    History of transfusion 02/2018    no adverse reaction    Hyperlipidemia      Hyperphosphatemia     Hypertension     Hypoglycemia 07/15/2022    Itching     Mastoiditis of right side 07/15/2022    Muscle weakness     general unsteadiness    Obesity (BMI 30.0-34.9) 09/09/2019    Orthostatic hypotension 10/25/2020    Peripheral polyneuropathy 11/20/2019    PONV (postoperative nausea and vomiting) 01/26/2018    Protein-calorie malnutrition (HCC) 11/23/2020    Recurrent peritonitis (HCC) due to peritoneal dialysis catheter 07/31/2020    Retinopathy     Seizures (HCC)     early 2020 - one time    Skin abnormality     some dime size areas where skin was scratched from itching    Spontaneous bacterial peritonitis (HCC) 10/19/2020    Squamous cell skin cancer     left temple    Stroke (HCC)     x2 - off balance/no driving/fatigue    Swelling of both lower extremities     Traumatic onycholysis 07/21/2022    Vomiting     Wears glasses        Past Surgical History:   Procedure Laterality Date    CARDIAC ELECTROPHYSIOLOGY PROCEDURE N/A 9/21/2023    Procedure: Cardiac loop recorder explant;  Surgeon: Parish Morgan MD;  Location: [facility];  Service: Cardiology    CARDIAC LOOP RECORDER  05/2018    COLONOSCOPY      EGD      EYE SURGERY Right     IR AV FISTULAGRAM/GRAFTOGRAM  02/23/2021    IR CEREBRAL ANGIOGRAPHY  1/12/2024    IR CEREBRAL ANGIOGRAPHY / INTERVENTION  1/5/2024    IR TUNNELED CENTRAL LINE PLACEMENT  02/16/2021    IR TUNNELED DIALYSIS CATHETER PLACEMENT  11/18/2020    IR TUNNELED DIALYSIS CATHETER REMOVAL  02/12/2021    IR TUNNELED DIALYSIS CATHETER REMOVAL  03/11/2021    MOHS SURGERY Left 12/14/2022    Left temple with Dr. Hassan    PERITONEAL CATHETER INSERTION N/A 08/27/2018    Procedure: UNROOF PD CATHETER;  Surgeon: Felipe Lindo DO;  Location: [facility];  Service: General    ME ARTERIOVENOUS ANASTOMOSIS OPEN DIRECT Left 11/09/2020    Procedure: CREATION FISTULA  ARTERIOVENOUS (AV) - LEFT WRIST;  Surgeon: Placido Altamirano MD;  Location: [facility];  Service: Vascular    ME  ESOPHAGOGASTRODUODENOSCOPY TRANSORAL DIAGNOSTIC N/A 2019    Procedure: ESOPHAGOGASTRODUODENOSCOPY (EGD);  Surgeon: Ale Figueroa MD;  Location: [facility];  Service: Gastroenterology    CO LAPS INSERTION TUNNELED INTRAPERITONEAL CATHETER N/A 2018    Procedure: LAPAROSCOPIC PD CATHETER PLACEMENT;  Surgeon: Felipe Lindo DO;  Location: [facility];  Service: General    CO REMOVAL TUNNELED INTRAPERITONEAL CATHETER N/A 2020    Procedure: REMOVAL CATHETER PERITONEAL DIALYSIS;  Surgeon: Abdifatah Ty MD;  Location: [facility];  Service: General    TONSILLECTOMY      UPPER GASTROINTESTINAL ENDOSCOPY         Family History   Problem Relation Age of Onset    Breast cancer Mother     Hypertension Mother     Hyperlipidemia Father     Hypertension Father     Leukemia Maternal Grandmother     Hyperlipidemia Maternal Grandfather     Hypertension Maternal Grandfather     Hyperlipidemia Paternal Grandmother     Hypertension Paternal Grandmother     Heart disease Paternal Grandfather         cardiac disorder    Diabetes Paternal Grandfather      I have reviewed and agree with the history as documented.    E-Cigarette/Vaping    E-Cigarette Use Current Every Day User     Comments medical marijuana      E-Cigarette/Vaping Substances    Nicotine No     THC Yes     CBD Yes     Flavoring No     Other No     Unknown No      Social History     Tobacco Use    Smoking status: Former     Current packs/day: 0.00     Average packs/day: 0.5 packs/day for 12.0 years (6.0 ttl pk-yrs)     Types: Cigarettes     Start date: 2006     Quit date: 2018     Years since quittin.9    Smokeless tobacco: Never    Tobacco comments:     quit 2018   Vaping Use    Vaping status: Every Day    Substances: THC, CBD   Substance Use Topics    Alcohol use: Not Currently    Drug use: Yes     Types: Marijuana     Comment: medical marijuana        Review of Systems   Constitutional:  Negative for chills and fever.   HENT:  Negative for ear  pain and sore throat.    Eyes:  Negative for pain and visual disturbance.   Respiratory:  Positive for chest tightness. Negative for cough and shortness of breath.    Cardiovascular:  Negative for chest pain, palpitations and leg swelling.   Gastrointestinal:  Positive for nausea and vomiting. Negative for abdominal pain.   Genitourinary:  Negative for dysuria and hematuria.   Musculoskeletal:  Negative for arthralgias and back pain.   Skin:  Negative for color change and rash.   Neurological:  Negative for seizures and syncope.   All other systems reviewed and are negative.      Physical Exam  ED Triage Vitals   Temperature Pulse Respirations Blood Pressure SpO2   01/29/24 0023 01/29/24 0022 01/29/24 0022 01/29/24 0022 01/29/24 0022   98.3 °F (36.8 °C) 77 18 (!) 192/92 98 %      Temp Source Heart Rate Source Patient Position - Orthostatic VS BP Location FiO2 (%)   01/29/24 0023 01/29/24 0022 01/29/24 0022 01/29/24 0022 --   Oral Monitor Lying Right arm       Pain Score       --                    Orthostatic Vital Signs  Vitals:    01/29/24 0022 01/29/24 0200 01/29/24 0300   BP: (!) 192/92 (!) 172/81 166/81   Pulse: 77 76 80   Patient Position - Orthostatic VS: Lying         Physical Exam  Vitals and nursing note reviewed.   Constitutional:       General: He is not in acute distress.     Appearance: Normal appearance. He is not ill-appearing.   HENT:      Head: Normocephalic and atraumatic.      Right Ear: External ear normal.      Left Ear: External ear normal.      Mouth/Throat:      Mouth: Mucous membranes are moist.      Pharynx: Oropharynx is clear.   Eyes:      General: No scleral icterus.     Extraocular Movements: Extraocular movements intact.      Conjunctiva/sclera: Conjunctivae normal.      Pupils: Pupils are equal, round, and reactive to light.   Cardiovascular:      Rate and Rhythm: Normal rate and regular rhythm.      Pulses: Normal pulses.      Heart sounds: Normal heart sounds.      Comments:  Palpable thrill with patient's left upper extremity fistula  Pulmonary:      Effort: Pulmonary effort is normal. No respiratory distress.      Breath sounds: Normal breath sounds.   Abdominal:      General: Abdomen is flat. There is no distension.      Tenderness: There is no abdominal tenderness.   Musculoskeletal:         General: No tenderness or signs of injury.      Cervical back: Neck supple. No rigidity.      Right lower leg: No tenderness. No edema.      Left lower leg: No tenderness. No edema.   Skin:     General: Skin is warm.      Coloration: Skin is not jaundiced.      Findings: No erythema or rash.   Neurological:      General: No focal deficit present.      Mental Status: He is alert and oriented to person, place, and time. Mental status is at baseline.      Cranial Nerves: Cranial nerves 2-12 are intact.      Sensory: Sensation is intact.      Motor: Weakness present.      Coordination: Coordination is intact.      Gait: Gait is intact.      Comments: Patient has weakness of the right upper extremity that has been residual since his recent stroke, no new areas of weakness, the rest of his neurological exam was normal and at his baseline   Psychiatric:         Mood and Affect: Mood normal.         Behavior: Behavior normal.         ED Medications  Medications   ondansetron (ZOFRAN) injection 4 mg (4 mg Intravenous Given 1/29/24 0133)   nitroglycerin (NITRO-BID) 2 % TD ointment 0.5 inch (0.5 inches Topical Given 1/29/24 0133)   aspirin chewable tablet 162 mg (162 mg Oral Given 1/29/24 0133)       Diagnostic Studies  Results Reviewed       Procedure Component Value Units Date/Time    HS Troponin I 2hr [[id number]]  (Normal) Collected: 01/29/24 0332    Lab Status: Final result Specimen: Blood from Arm, Right Updated: 01/29/24 0410     hs TnI 2hr 17 ng/L      Delta 2hr hsTnI -4 ng/L     HS Troponin 0hr (reflex protocol) [[id number]]  (Normal) Collected: 01/29/24 0127    Lab Status: Final result Specimen:  Blood from Arm, Right Updated: 01/29/24 0203     hs TnI 0hr 21 ng/L     Comprehensive metabolic panel [[id number]]  (Abnormal) Collected: 01/29/24 0127    Lab Status: Final result Specimen: Blood from Arm, Right Updated: 01/29/24 0202     Sodium 138 mmol/L      Potassium 4.8 mmol/L      Chloride 97 mmol/L      CO2 29 mmol/L      ANION GAP 12 mmol/L      BUN 37 mg/dL      Creatinine 10.07 mg/dL      Glucose 107 mg/dL      Calcium 8.3 mg/dL      AST 20 U/L      ALT 18 U/L      Alkaline Phosphatase 78 U/L      Total Protein 6.2 g/dL      Albumin 4.0 g/dL      Total Bilirubin 0.55 mg/dL      eGFR 5 ml/min/1.73sq m     Narrative:      National Kid[employer] Disease Foundation guidelines for Chronic Kidney Disease (CKD):     Stage 1 with normal or high GFR (GFR > 90 mL/min/1.73 square meters)    Stage 2 Mild CKD (GFR = 60-89 mL/min/1.73 square meters)    Stage 3A Moderate CKD (GFR = 45-59 mL/min/1.73 square meters)    Stage 3B Moderate CKD (GFR = 30-44 mL/min/1.73 square meters)    Stage 4 Severe CKD (GFR = 15-29 mL/min/1.73 square meters)    Stage 5 End Stage CKD (GFR <15 mL/min/1.73 square meters)  Note: GFR calculation is accurate only with a steady state creatinine    Lipase [[id number]]  (Abnormal) Collected: 01/29/24 0127    Lab Status: Final result Specimen: Blood from Arm, Right Updated: 01/29/24 0159     Lipase 96 u/L     CBC and differential [[id number]]  (Abnormal) Collected: 01/29/24 0127    Lab Status: Final result Specimen: Blood from Arm, Right Updated: 01/29/24 0135     WBC 5.57 Thousand/uL      RBC 2.62 Million/uL      Hemoglobin 8.1 g/dL      Hematocrit 25.0 %      MCV 95 fL      MCH 30.9 pg      MCHC 32.4 g/dL      RDW 14.4 %      MPV 10.1 fL      Platelets 204 Thousands/uL      nRBC 0 /100 WBCs      Neutrophils Relative 61 %      Immat GRANS % 0 %      Lymphocytes Relative 23 %      Monocytes Relative 9 %      Eosinophils Relative 6 %      Basophils Relative 1 %      Neutrophils Absolute 3.41 Thousands/µL       Immature Grans Absolute 0.02 Thousand/uL      Lymphocytes Absolute 1.29 Thousands/µL      Monocytes Absolute 0.49 Thousand/µL      Eosinophils Absolute 0.31 Thousand/µL      Basophils Absolute 0.05 Thousands/µL     Fingerstick Glucose (POCT) [[id number]]  (Normal) Collected: 01/29/24 0120    Lab Status: Final result Updated: 01/29/24 0122     POC Glucose 114 mg/dl     Fingerstick Glucose (POCT) [[id number]]  (Normal) Collected: 01/29/24 0030    Lab Status: Final result Updated: 01/29/24 0031     POC Glucose 68 mg/dl                    XR chest 1 view portable   ED Interpretation by Manuel Esposito DO (01/29 0206)   No acute cardiopulmonary abnormalities visualized            Procedures  ECG 12 Lead Documentation Only    Date/Time: 1/29/2024 1:42 AM    Performed by: Manuel Esposito DO  Authorized by: Manuel Esposito DO    Indications / Diagnosis:  Chest tightness  ECG reviewed by me, the ED Provider: yes    Patient location:  ED  Previous ECG:     Previous ECG:  Compared to current    Similarity:  No change  Interpretation:     Interpretation: normal    Rate:     ECG rate:  76    ECG rate assessment: normal    Rhythm:     Rhythm: sinus rhythm    Ectopy:     Ectopy: none    QRS:     QRS axis:  Normal    QRS intervals:  Normal  Conduction:     Conduction: normal    ST segments:     ST segments:  Normal  T waves:     T waves: flattening      Flattening:  V5 and V6        ED Course  ED Course as of 01/29/24 0413   Mon Jan 29, 2024   0205 Creatinine(!): 10.07  Patient is due for dialysis early this morning   0216 hs TnI 0hr: 21  Plan to repeat 2-hour troponin, if normal and blood pressure stable he can be discharged             HEART Risk Score      Flowsheet Row Most Recent Value   Heart Score Risk Calculator    History 1 Filed at: 01/29/2024 0206   ECG 0 Filed at: 01/29/2024 0206   Age 0 Filed at: 01/29/2024 0206   Risk Factors 2 Filed at: 01/29/2024 0206   Troponin 1 Filed at: 01/29/2024  0206   HEART Score 4 Filed at: 01/29/2024 0206                                  Medical Decision Making  Bill is a 38 year old male with history of diabetes, hyperlipidemia, hypertension, ESRD on HD, depression/anxiety, recent diagnosis of acute subarachnoid hemorrhage/acute cerebral infarcts of unknown origin for which he was hospitalized and discharged on 1/23, presenting for evaluation of continued elevated blood pressures, associated chest tightness and nausea.  Patient just was discharged from the hospital 1.5 days ago for epigastric abdominal pain vomiting.  Patient did have several episodes of vomiting today.    On arrival, patient was not in any acute distress and was well-appearing, his initial first blood pressure was 192/92, repeat blood pressure is more consistently in the 170s over 80s.  Heart and lung sounds were normal.  He complained of a mild chest tightness at this time.  No abdominal tenderness on exam.  He did not vomit in the ED.  Neurological exam is normal for the patient.    Concern for hypertensive urgency, hypertensive emergency.  Low suspicion for ACS, do not suspect pulmonary embolism.  Low suspicion for pneumothorax.    Patient's lab work was relatively unremarkable and at patient's baseline, though his lipase was slightly elevated, patient did not have any abdominal tenderness, not consistent with acute pancreatitis at this time.  Noted that his creatinine was significantly elevated, however he is due for dialysis this morning.  Initial troponin was not significantly elevated, ECG did not show any evidence of acute ischemia.  Chest x-ray unremarkable.    Delta troponin decreased.  Patient's blood pressure is decreased without any acute intervention here in the emergency department, decreased to as low as 166/81.  Patient stable for discharge, advised that he follow with his PCP and take his medications as prescribed, give strict return precautions, he was understanding and agreeable to  plan.    Amount and/or Complexity of Data Reviewed  Labs: ordered. Decision-making details documented in ED Course.  Radiology: ordered and independent interpretation performed.    Risk  OTC drugs.  Prescription drug management.          Disposition  Final diagnoses:   Hypertension   Nausea and vomiting   Chest tightness     Time reflects when diagnosis was documented in both MDM as applicable and the Disposition within this note       Time User Action Codes Description Comment    1/29/2024  4:11 AM Manuel Esposito Add [I10] Hypertension     1/29/2024  4:11 AM Manuel Esposito Add [R11.2] Nausea and vomiting     1/29/2024  4:11 AM Manuel Esposito Add [R07.89] Chest tightness           ED Disposition       ED Disposition   Discharge    Condition   Stable    Date/Time   Mon Jan 29, 2024 0411    Comment   Mateus Mckeon discharge to home/self care.                   Follow-up Information       Follow up With Specialties Details Why Contact Info    Dania Sher DO Internal Medicine Schedule an appointment as soon as possible for a visit   [address]  [address], [address]  [facility] [id number] [phone]              Patient's Medications   Discharge Prescriptions    No medications on file     No discharge procedures on file.    PDMP Review         Value Time User    PDMP Reviewed  Yes 1/29/2024 12:53 AM Guanaco Collins MD             ED Provider  Attending physically available and evaluated Mateus Mckeon. I managed the patient along with the ED Attending.    Electronically Signed by           Manuel Esposito DO  01/29/24 0413

## 2024-01-29 NOTE — ED ATTENDING ATTESTATION
1/29/2024  I, Guanaco Collins MD, saw and evaluated the patient. I have discussed the patient with the resident/non-physician practitioner and agree with the resident's/non-physician practitioner's findings, Plan of Care, and MDM as documented in the resident's/non-physician practitioner's note, except where noted. All available labs and Radiology studies were reviewed.  I was present for key portions of any procedure(s) performed by the resident/non-physician practitioner and I was immediately available to provide assistance.       At this point I agree with the current assessment done in the Emergency Department.  I have conducted an independent evaluation of this patient a history and physical is as follows: Patient is a 38 year old male with chest pressure pain tonight with N/V and elevated BP. No sob. No fever. No cough. No travel. Vapes medical marijuana. Was last seen in this ED on 1/25/24 for abd pain. Had stroke earlier this month and has right sided hand weakness. Patient is on hemodialysis. [facility] website checked on this patient and no Rx found. NCAT. PERRL. Oropharynx clear. Heart regular without murmur. Lungs clear. Nontender chest wall. Abdomen soft and nontender. Good bowel sounds. No edema. No rash noted. (+) R hand weakness. DDX including but not limited to: ACS, MI, doubt PE; PTX, pneumonia, doubt dissection; HTN, pleurisy, pericarditis, myocarditis, rhabdomyolysis, GI etiology. I reviewed EKG. Will check labs and CXR and give zofran and NTG paste.     ED Course         Critical Care Time  Procedures

## 2024-01-29 NOTE — TELEPHONE ENCOUNTER
Pt mom called stating pt recently in the hospital  and Labetolol was increased to 400 mg TID and [facility] will not fill until they get approval from nephrology and cards. Nephro approved, but waiting for cardiology to respond.  Pt has an appt next week to see Jennifer since cardiology was not consulted. I have asked her to keep a log of pressures twice daily until the appt.   Pt does have dialysis and BP's do not drop on dialysis days.

## 2024-01-30 ENCOUNTER — TRANSITIONAL CARE MANAGEMENT (OUTPATIENT)
Dept: INTERNAL MEDICINE CLINIC | Facility: CLINIC | Age: 39
End: 2024-01-30

## 2024-01-30 ENCOUNTER — VBI (OUTPATIENT)
Dept: INTERNAL MEDICINE CLINIC | Facility: CLINIC | Age: 39
End: 2024-01-30

## 2024-01-30 ENCOUNTER — EVALUATION (OUTPATIENT)
Dept: PHYSICAL THERAPY | Facility: CLINIC | Age: 39
End: 2024-01-30
Payer: MEDICARE

## 2024-01-30 DIAGNOSIS — R42 VERTIGO: ICD-10-CM

## 2024-01-30 DIAGNOSIS — I63.9 ACUTE CVA (CEREBROVASCULAR ACCIDENT) (HCC): ICD-10-CM

## 2024-01-30 PROCEDURE — 97112 NEUROMUSCULAR REEDUCATION: CPT | Performed by: PHYSICAL THERAPIST

## 2024-01-30 PROCEDURE — 97162 PT EVAL MOD COMPLEX 30 MIN: CPT | Performed by: PHYSICAL THERAPIST

## 2024-01-30 RX ORDER — ONDANSETRON 4 MG/1
4 TABLET, FILM COATED ORAL EVERY 8 HOURS PRN
Qty: 90 TABLET | Refills: 0 | Status: SHIPPED | OUTPATIENT
Start: 2024-01-30

## 2024-01-30 NOTE — TELEPHONE ENCOUNTER
Per TT with Dr Middleton he does not want to make a decision on the increased dose of Labetalol. He said it should be nephrology's decision and he has not seen the patient in a while.   Pt has an appt next week with Jennifer.

## 2024-01-30 NOTE — PROGRESS NOTES
PT Evaluation     Today's date: 2024  Patient name: Mateus Mckeon  : 1985  MRN: [id number]  Referring provider: Court Edwards MD  Dx:   Encounter Diagnosis     ICD-10-CM    1. Acute CVA (cerebrovascular accident) (HCC)  I63.9 Ambulatory referral to Physical Therapy          Start Time: 1015  Stop Time: 1100  Total time in clinic (min): 45 minutes    Assessment  Assessment details: Provided patent with written instructions for HEP with good understanding. Provided him with education regarding test finding and plan for therapy. Patient agrees.   Impairments: abnormal gait, activity intolerance, impaired balance, impaired physical strength, lacks appropriate home exercise program and safety issue    Symptom irritability: moderateUnderstanding of Dx/Px/POC: good   Prognosis: good    Goals  STG:  Patient will improve 6MWT by 100ft w/LRAD demonstrating significant improvements in endurance  w/in 4 weeks  Patient will be independent with HEP within 4 weeks  Patient will be able to ambulate household distances (100ft) or greater with LRAD  within 4 weeks  Patient will demonstrated improved LE strength and endurance by improved 5xSTS to 15s or less within 4 weeks  LTG:  Patient will improve 6MWT by 200ft w/LRAD demonstrating significant improvements in endurance  w/in 8 weeks  Patient will be able to ambulate community distances (300ft) or greater with LRAD within 8 weeks   Patient will improve TUG to 12s or less w.LRAD indicating they are no longer at risk for increased falls 8 weeks  Patient will improve FGA to 22/30 or greater indicating they are no longer at higher risk for falls within 8 weeks   Patient will be independent with progress HEP within 8 weeks   Patient will improve gait speed to 1.0 m/s indicating they are no longer at higher risk for  "falls within 8 weeks          Plan  Patient would benefit from: skilled physical therapy, OT eval and skilled occupational therapy  Planned therapy interventions: neuromuscular re-education, balance, patient education, home exercise program, graded exercise, graded activity, gait training, coordination, therapeutic activities, therapeutic exercise, transfer training, strengthening, sensory integrative techniques and postural training  Frequency: 2x week  Duration in weeks: 8  Plan of Care beginning date: 1/30/2024  Plan of Care expiration date: 3/26/2024  Treatment plan discussed with: patient      Subjective Evaluation    History of Present Illness  Mechanism of injury: Just had 3rd stroke, maybe about 2 weeks ago. Went into hospital had a brain bleed did an angiogram which caused the stroke.     Can't use right hand can move arm at shoulder but can't  things. Feels there's a hitch in his speech. General awareness is lacking and with increased fatigue.     With past strokes has \"numbness in right ear\" feels hearing has lessened.     Does have kidney failure, has dialysis 3x/week.    Prior to recent CVA incident he was working to make a life for himself - seeing family more again. Wasn't working, couldn't drive. Was dressing and bathing himself - unable to complete this now. Mother and father are assisting.   Is living with parents.    For fun he hangs out with niece and nephews 1-10 years old.     Has neuropathy b/L feet sometimes cause pain 2/10. Denies pain at this time     Had 1 incident  of seizure - not taking medication fo them at this time     After 2nd stroke had vision issues, muscles in eyes were matching up ... Had surgery for strabismus     LE are weaker than 1 month ago.   Quality of life: good    Patient Goals  Patient goal: improve walking, utilize cane vs rollator walker  Pain  No pain reported    Social Support  Stairs in house: yes   Lives in: multiple-level home (uses handrail and reciprocal " "pattern)  Lives with: parents    Employment status: not working  Exercise history: no exercise history    Treatments  Previous treatment: physical therapy, occupational therapy and speech therapy      Objective  PT/OT Neuro Exam  Neurologic Exam        Balance Test 01/30        6 Minute Walk Test (ft): NV         2 Minute Walk Test (ft):         Gait Speed (ft/s):         5x Sit To Stand (s): 31.25s   W/ multiple attempts needed to stand  HR: attempted after wasn't able to read        TUG 23.6s no AD   16.3s w/ Rollator           FGA NV            MCTSIB Number of Seconds   Feet Together, Eyes Open 30s   Feet Together, Eyes Closed 9\", 30\"   Feet Together, Eyes Open Foam NV    Feet Together, Eyes Closed Foam NV        Coordination Left Right   Heel To [place] WNL WNL   Finger To Nose     Rapid Alternating Movement     UE     LE Slowed  Slowed              Manual Muscle Testing - Hip Left Right   Flexion 5 4   Extension     Abduction     External Rotation       Manual Muscle Testing - Knee Left Right   Flexion 5 4   Extension 5 4     Manual Muscle Testing - Ankle Left Right   Doriflexion 4 4                 Gait Assessment:   W/o Ad:  Decreased b/Lheel strike, Decreased B/L Push OFF  Decreased B/L Step Height, Decreased B/L Step Length,    decreased Arm Swing, decreased  Trunk ROT    w/rollator: improved step height and lengh with AD                  Short Term Goal Expiration Date:(4 weeks)  Long Term Goal Expiration Date: (8 weeks)  POC Expiration Date: (8 weeks)    Visit count: 1 of 10; PN     POC expires Unit limit Auth Expiration date PT/OT/ST + Visit Limit?                                 Visit/Unit Tracking  AUTH Status:  Date 1/30             BPMN Used               Remaining                     Precautions hx CVA and hx seizure, fall risk      Access Code: [email or web address]  URL: [email or web address]  Date: 01/30/2024  Prepared by: Shelbie Topping    Exercises  - Heel Raises with Counter Support  - 1 x " daily - 7 x weekly - 2-3 sets - 10 reps  - Standing Hip Abduction with Counter Support  - 1 x daily - 7 x weekly - 2-3 sets - 10 reps  - Standing Hip Extension with Counter Support  - 1 x daily - 7 x weekly - 2-3 sets - 10 reps  - Sit to Stand  - 1 x daily - 7 x weekly - 2-3 sets - 10 reps    Manuals                                        Neuro Re-Ed                                                                 Ther Ex                                                                        Ther Activity                        Gait Training                        Modalities

## 2024-01-30 NOTE — TELEPHONE ENCOUNTER
01/30/24 2:53 PM    Patient contacted post ED visit, [facility] department spoke with patient/caregiver and outreach was successful.    Thank you.  Dru Mora  [facility]

## 2024-01-31 ENCOUNTER — TELEPHONE (OUTPATIENT)
Dept: NEUROLOGY | Facility: CLINIC | Age: 39
End: 2024-01-31

## 2024-01-31 NOTE — TELEPHONE ENCOUNTER
Post CVA Discharge Follow Up  Hospitalization: 1/5/24-1/23/34, 1/25/24-1/27/24, 1/29/24 ED visit    Called patient. No answer. Unable to leave a voice message since the mail box is full.

## 2024-02-01 ENCOUNTER — OFFICE VISIT (OUTPATIENT)
Dept: PHYSICAL THERAPY | Facility: CLINIC | Age: 39
End: 2024-02-01
Payer: MEDICARE

## 2024-02-01 ENCOUNTER — RA CDI HCC (OUTPATIENT)
Dept: OTHER | Facility: HOSPITAL | Age: 39
End: 2024-02-01

## 2024-02-01 DIAGNOSIS — I63.9 ACUTE CVA (CEREBROVASCULAR ACCIDENT) (HCC): Primary | ICD-10-CM

## 2024-02-01 PROCEDURE — 97110 THERAPEUTIC EXERCISES: CPT

## 2024-02-01 PROCEDURE — 97112 NEUROMUSCULAR REEDUCATION: CPT

## 2024-02-01 NOTE — PROGRESS NOTES
"Daily Note     Today's date: 2024  Patient name: Mateus Mckeon  : 1985  MRN: [id number]  Referring provider: Court Edwards MD  Dx:   Encounter Diagnosis     ICD-10-CM    1. Acute CVA (cerebrovascular accident) (HCC)  I63.9           Start Time: 1034  Stop Time: 1130  Total time in clinic (min): 56 minutes    Subjective: Reports that he is doing okay. Is a little bummed that he hasn't been able to schedule occupational therapy yet. Notes his blood pressure has been high lately - when he first woke up it was 200 systolic over a bottom number he doesn't remember and then he took his BP medication and it went down to \"somewhere in the 170s\".       Objective: See treatment diary below    FGA    6MWT 900 ft with rollator. Loss of balance at turns intermittently   1st lap: 0.82 m/s with rollator   Last lap: 0.68 m/s with rollator     BP pre: 166/84 mmhg, 81 bpm, 98% Spo2   BP post: 168/86 mmHg, 85 bpm, 97% Spo2   BP end: 176/92 mmHg, 87, 98% Spo2 (following treadmill)       Assessment: Tolerated treatment well. Good showing with multidirectional stepping, will benefit from addition of increased ankle weight or obstacle/haydee next visit. Would also do well with resisted ambuation during ext session to increase RPE. Requires frequent monitoring of blood pressure (with use of RUE due to dialysis on LUE) to ensure safe performance of exercise. Easily fatigueable during session but great motivation to participate and improve his functional mobility. Also able to perform treadmill activities. Education regarding as much active movement of R hand and wrist as possible until OT evaluation to assist with regaining as much movement as possible. Also encouraged frequent BP monitoring at home. Also encouraged holding off of performance of treadmill at home until can safely perform independently without use of harness while in therapy. Patient verbalized good understanding of education. Patient demonstrated " fatigue post treatment, exhibited good technique with therapeutic exercises, and would benefit from continued PT      Plan: Continue per plan of care.  Progress treatment as tolerated.       Short Term Goal Expiration Date:(4 weeks)  Long Term Goal Expiration Date: (8 weeks)  POC Expiration Date: (8 weeks)    Visit count: 1 of 10; PN     POC expires Unit limit Auth Expiration date PT/OT/ST + Visit Limit?                                   Visit/Unit Tracking  AUTH Status:  Date 1/30 2/1            BPMN Used               Remaining                     Precautions hx CVA and hx seizure, fall risk, 32 oz liquid per day while on dialysis (shunt on LUE). M/W/F dialysis      Access Code: [email or web address]  URL: [email or web address]  Date: 01/30/2024  Prepared by: Shelbie Costa    Exercises  - Heel Raises with Counter Support  - 1 x daily - 7 x weekly - 2-3 sets - 10 reps  - Standing Hip Abduction with Counter Support  - 1 x daily - 7 x weekly - 2-3 sets - 10 reps  - Standing Hip Extension with Counter Support  - 1 x daily - 7 x weekly - 2-3 sets - 10 reps  - Sit to Stand  - 1 x daily - 7 x weekly - 2-3 sets - 10 reps    Manuals IE 1/30 2/1                                       Neuro Re-Ed   Finished outcome measure testing       Static Balance         Dynamic Balance   Fwd/Bwd #5 RAW 4x laps SOLO     Lat #5 RAW 4x laps SOLO       HKM   3x laps #5 RAW with pause      Shuttle         Resisted Amb         Compliant                         Education  MH       Ther Ex        Hip abd/ext         Sit <> Stand         HR/TR                                         TM for endurance   1.3-1.6 mph, 2-5% incline 6 SOLO x6 mins         Ther Activity                        Gait Training                        Modalities

## 2024-02-02 NOTE — TELEPHONE ENCOUNTER
Post CVA Discharge Follow Up  Hospitalization: 1/5/24-1/23/24, 1/25/24-1/27/24,1/29/24 ED visit     Called patient with no answer. Left a voice message requesting for a call. Provided the office's phone number.

## 2024-02-03 ENCOUNTER — HOSPITAL ENCOUNTER (INPATIENT)
Facility: HOSPITAL | Age: 39
LOS: 4 days | Discharge: HOME | DRG: 304 | End: 2024-02-08
Attending: EMERGENCY MEDICINE | Admitting: INTERNAL MEDICINE
Payer: MEDICARE

## 2024-02-03 ENCOUNTER — APPOINTMENT (EMERGENCY)
Dept: CT IMAGING | Facility: HOSPITAL | Age: 39
DRG: 304 | End: 2024-02-03
Payer: MEDICARE

## 2024-02-03 DIAGNOSIS — R51.9 HEADACHE: ICD-10-CM

## 2024-02-03 DIAGNOSIS — I63.9 CEREBROVASCULAR ACCIDENT (CVA) OF LEFT PONTINE STRUCTURE (HCC): Chronic | ICD-10-CM

## 2024-02-03 DIAGNOSIS — N18.6 ESRD ON HEMODIALYSIS (HCC): ICD-10-CM

## 2024-02-03 DIAGNOSIS — I10 PRIMARY HYPERTENSION: ICD-10-CM

## 2024-02-03 DIAGNOSIS — I60.9 SUBARACHNOID HEMORRHAGE (HCC): Chronic | ICD-10-CM

## 2024-02-03 DIAGNOSIS — G47.33 OSA (OBSTRUCTIVE SLEEP APNEA): Chronic | ICD-10-CM

## 2024-02-03 DIAGNOSIS — I10 UNCONTROLLED HYPERTENSION: ICD-10-CM

## 2024-02-03 DIAGNOSIS — G47.00 INSOMNIA, UNSPECIFIED TYPE: ICD-10-CM

## 2024-02-03 DIAGNOSIS — I16.0 HYPERTENSIVE URGENCY: Primary | ICD-10-CM

## 2024-02-03 DIAGNOSIS — Z99.2 ESRD ON HEMODIALYSIS (HCC): ICD-10-CM

## 2024-02-03 DIAGNOSIS — I10 ESSENTIAL (PRIMARY) HYPERTENSION: Chronic | ICD-10-CM

## 2024-02-03 LAB
ALBUMIN SERPL BCP-MCNC: 4.2 G/DL (ref 3.5–5)
ALP SERPL-CCNC: 87 U/L (ref 34–104)
ALT SERPL W P-5'-P-CCNC: 12 U/L (ref 7–52)
ANION GAP SERPL CALCULATED.3IONS-SCNC: 10 MMOL/L
AST SERPL W P-5'-P-CCNC: 15 U/L (ref 13–39)
ATRIAL RATE: 74 BPM
BASOPHILS # BLD AUTO: 0.07 THOUSANDS/ÂΜL (ref 0–0.1)
BASOPHILS NFR BLD AUTO: 1 % (ref 0–1)
BILIRUB SERPL-MCNC: 0.63 MG/DL (ref 0.2–1)
BUN SERPL-MCNC: 19 MG/DL (ref 5–25)
CALCIUM SERPL-MCNC: 8.7 MG/DL (ref 8.4–10.2)
CHLORIDE SERPL-SCNC: 100 MMOL/L (ref 96–108)
CO2 SERPL-SCNC: 33 MMOL/L (ref 21–32)
CREAT SERPL-MCNC: 8.36 MG/DL (ref 0.6–1.3)
EOSINOPHIL # BLD AUTO: 0.45 THOUSAND/ÂΜL (ref 0–0.61)
EOSINOPHIL NFR BLD AUTO: 8 % (ref 0–6)
ERYTHROCYTE [DISTWIDTH] IN BLOOD BY AUTOMATED COUNT: 16 % (ref 11.6–15.1)
GFR SERPL CREATININE-BSD FRML MDRD: 7 ML/MIN/1.73SQ M
GLUCOSE SERPL-MCNC: 176 MG/DL (ref 65–140)
HCT VFR BLD AUTO: 25.7 % (ref 36.5–49.3)
HGB BLD-MCNC: 8.2 G/DL (ref 12–17)
IMM GRANULOCYTES # BLD AUTO: 0.04 THOUSAND/UL (ref 0–0.2)
IMM GRANULOCYTES NFR BLD AUTO: 1 % (ref 0–2)
LYMPHOCYTES # BLD AUTO: 1.48 THOUSANDS/ÂΜL (ref 0.6–4.47)
LYMPHOCYTES NFR BLD AUTO: 27 % (ref 14–44)
MCH RBC QN AUTO: 31.7 PG (ref 26.8–34.3)
MCHC RBC AUTO-ENTMCNC: 31.9 G/DL (ref 31.4–37.4)
MCV RBC AUTO: 99 FL (ref 82–98)
MONOCYTES # BLD AUTO: 0.47 THOUSAND/ÂΜL (ref 0.17–1.22)
MONOCYTES NFR BLD AUTO: 9 % (ref 4–12)
NEUTROPHILS # BLD AUTO: 3 THOUSANDS/ÂΜL (ref 1.85–7.62)
NEUTS SEG NFR BLD AUTO: 54 % (ref 43–75)
NRBC BLD AUTO-RTO: 0 /100 WBCS
P AXIS: 60 DEGREES
PLATELET # BLD AUTO: 268 THOUSANDS/UL (ref 149–390)
PMV BLD AUTO: 10.7 FL (ref 8.9–12.7)
POTASSIUM SERPL-SCNC: 4.3 MMOL/L (ref 3.5–5.3)
PR INTERVAL: 172 MS
PROT SERPL-MCNC: 6.5 G/DL (ref 6.4–8.4)
QRS AXIS: 54 DEGREES
QRSD INTERVAL: 74 MS
QT INTERVAL: 442 MS
QTC INTERVAL: 490 MS
RBC # BLD AUTO: 2.59 MILLION/UL (ref 3.88–5.62)
SODIUM SERPL-SCNC: 143 MMOL/L (ref 135–147)
T WAVE AXIS: 5 DEGREES
VENTRICULAR RATE: 74 BPM
WBC # BLD AUTO: 5.51 THOUSAND/UL (ref 4.31–10.16)

## 2024-02-03 PROCEDURE — 70450 CT HEAD/BRAIN W/O DYE: CPT

## 2024-02-03 PROCEDURE — 93005 ELECTROCARDIOGRAM TRACING: CPT

## 2024-02-03 PROCEDURE — 80053 COMPREHEN METABOLIC PANEL: CPT

## 2024-02-03 PROCEDURE — 96365 THER/PROPH/DIAG IV INF INIT: CPT

## 2024-02-03 PROCEDURE — 99284 EMERGENCY DEPT VISIT MOD MDM: CPT

## 2024-02-03 PROCEDURE — 99291 CRITICAL CARE FIRST HOUR: CPT

## 2024-02-03 PROCEDURE — 85025 COMPLETE CBC W/AUTO DIFF WBC: CPT

## 2024-02-03 PROCEDURE — 36415 COLL VENOUS BLD VENIPUNCTURE: CPT

## 2024-02-03 PROCEDURE — 96375 TX/PRO/DX INJ NEW DRUG ADDON: CPT

## 2024-02-03 RX ORDER — ACETAMINOPHEN 325 MG/1
975 TABLET ORAL ONCE
Status: COMPLETED | OUTPATIENT
Start: 2024-02-03 | End: 2024-02-03

## 2024-02-03 RX ORDER — LABETALOL HYDROCHLORIDE 5 MG/ML
20 INJECTION, SOLUTION INTRAVENOUS ONCE
Status: COMPLETED | OUTPATIENT
Start: 2024-02-03 | End: 2024-02-03

## 2024-02-03 RX ADMIN — Medication 20 MG: at 22:20

## 2024-02-03 RX ADMIN — ACETAMINOPHEN 975 MG: 325 TABLET, FILM COATED ORAL at 22:23

## 2024-02-03 RX ADMIN — NICARDIPINE HYDROCHLORIDE 5 MG/HR: 2.5 INJECTION, SOLUTION INTRAVENOUS at 23:14

## 2024-02-03 NOTE — Clinical Note
`Case was discussed with JOSH Medina, and the patient's admission status was agreed to be Admission Status: inpatient status to the service of Dr. Lentz.

## 2024-02-04 PROBLEM — N18.6 END STAGE KIDNEY DISEASE (HCC): Chronic | Status: ACTIVE | Noted: 2020-12-03

## 2024-02-04 PROBLEM — I10 ESSENTIAL (PRIMARY) HYPERTENSION: Chronic | Status: ACTIVE | Noted: 2022-01-06

## 2024-02-04 PROBLEM — L29.9 PRURITUS: Status: RESOLVED | Noted: 2019-09-06 | Resolved: 2024-02-04

## 2024-02-04 PROBLEM — G46.4 CEREBELLAR STROKE SYNDROME: Chronic | Status: ACTIVE | Noted: 2022-01-06

## 2024-02-04 PROBLEM — K31.84 GASTROPARESIS DIABETICORUM: Chronic | Status: ACTIVE | Noted: 2019-09-17

## 2024-02-04 PROBLEM — R10.13 ABDOMINAL PAIN, ACUTE, EPIGASTRIC: Status: RESOLVED | Noted: 2024-01-25 | Resolved: 2024-02-04

## 2024-02-04 PROBLEM — E78.5 HYPERLIPIDEMIA: Chronic | Status: ACTIVE | Noted: 2023-11-26

## 2024-02-04 PROBLEM — N18.6 ANEMIA DUE TO CHRONIC KIDNEY DISEASE, ON CHRONIC DIALYSIS (HCC): Chronic | Status: ACTIVE | Noted: 2024-01-05

## 2024-02-04 PROBLEM — I27.20 PULMONARY HTN (HCC): Chronic | Status: ACTIVE | Noted: 2022-04-22

## 2024-02-04 PROBLEM — E66.9 OBESITY (BMI 30.0-34.9): Status: ACTIVE | Noted: 2019-09-09

## 2024-02-04 PROBLEM — F32.A DEPRESSION: Chronic | Status: ACTIVE | Noted: 2021-04-15

## 2024-02-04 PROBLEM — E11.43 GASTROPARESIS DIABETICORUM: Chronic | Status: ACTIVE | Noted: 2019-09-17

## 2024-02-04 PROBLEM — E66.811 OBESITY (BMI 30.0-34.9): Status: ACTIVE | Noted: 2019-09-09

## 2024-02-04 PROBLEM — F41.1 GENERALIZED ANXIETY DISORDER: Chronic | Status: ACTIVE | Noted: 2021-07-01

## 2024-02-04 PROBLEM — D63.1 ANEMIA DUE TO CHRONIC KIDNEY DISEASE, ON CHRONIC DIALYSIS (HCC): Chronic | Status: ACTIVE | Noted: 2024-01-05

## 2024-02-04 PROBLEM — K21.9 GERD (GASTROESOPHAGEAL REFLUX DISEASE): Chronic | Status: ACTIVE | Noted: 2021-01-18

## 2024-02-04 PROBLEM — E03.9 HYPOTHYROIDISM: Chronic | Status: ACTIVE | Noted: 2022-02-08

## 2024-02-04 PROBLEM — E83.39 HYPERPHOSPHATEMIA: Chronic | Status: ACTIVE | Noted: 2018-01-29

## 2024-02-04 PROBLEM — Z99.2 ESRD ON HEMODIALYSIS (HCC): Chronic | Status: ACTIVE | Noted: 2020-12-03

## 2024-02-04 PROBLEM — Z99.2 ANEMIA DUE TO CHRONIC KIDNEY DISEASE, ON CHRONIC DIALYSIS (HCC): Chronic | Status: ACTIVE | Noted: 2024-01-05

## 2024-02-04 PROBLEM — E66.811 OBESITY (BMI 30.0-34.9): Chronic | Status: ACTIVE | Noted: 2019-09-09

## 2024-02-04 PROBLEM — R90.89 ABNORMAL FINDING ON MRI OF BRAIN: Status: RESOLVED | Noted: 2024-01-09 | Resolved: 2024-02-04

## 2024-02-04 PROBLEM — E11.40 DIABETIC NEUROPATHY (HCC): Chronic | Status: ACTIVE | Noted: 2020-03-09

## 2024-02-04 PROBLEM — I27.20 PULMONARY HTN (HCC): Chronic | Status: RESOLVED | Noted: 2022-04-22 | Resolved: 2024-02-04

## 2024-02-04 PROBLEM — R20.0 NUMBNESS OF ARM: Status: RESOLVED | Noted: 2021-07-01 | Resolved: 2024-02-04

## 2024-02-04 PROBLEM — F41.9 ANXIETY: Chronic | Status: ACTIVE | Noted: 2023-11-26

## 2024-02-04 PROBLEM — R60.0 BILATERAL LEG EDEMA: Status: RESOLVED | Noted: 2018-02-19 | Resolved: 2024-02-04

## 2024-02-04 PROBLEM — I60.9 SUBARACHNOID HEMORRHAGE (HCC): Chronic | Status: ACTIVE | Noted: 2024-01-05

## 2024-02-04 PROBLEM — E10.9 TYPE 1 DIABETES MELLITUS ON INSULIN THERAPY (HCC): Chronic | Status: ACTIVE | Noted: 2023-11-26

## 2024-02-04 PROBLEM — I25.10 CORONARY ARTERY DISEASE INVOLVING NATIVE CORONARY ARTERY OF NATIVE HEART WITHOUT ANGINA PECTORIS: Chronic | Status: ACTIVE | Noted: 2022-04-22

## 2024-02-04 PROBLEM — J30.89 ENVIRONMENTAL AND SEASONAL ALLERGIES: Chronic | Status: ACTIVE | Noted: 2019-05-21

## 2024-02-04 PROBLEM — E66.9 OBESITY (BMI 30.0-34.9): Chronic | Status: ACTIVE | Noted: 2019-09-09

## 2024-02-04 LAB
ANION GAP SERPL CALCULATED.3IONS-SCNC: 10 MMOL/L
BUN SERPL-MCNC: 20 MG/DL (ref 5–25)
CALCIUM SERPL-MCNC: 8.3 MG/DL (ref 8.4–10.2)
CHLORIDE SERPL-SCNC: 101 MMOL/L (ref 96–108)
CO2 SERPL-SCNC: 30 MMOL/L (ref 21–32)
CREAT SERPL-MCNC: 8.56 MG/DL (ref 0.6–1.3)
ERYTHROCYTE [DISTWIDTH] IN BLOOD BY AUTOMATED COUNT: 16.5 % (ref 11.6–15.1)
GFR SERPL CREATININE-BSD FRML MDRD: 6 ML/MIN/1.73SQ M
GLUCOSE SERPL-MCNC: 124 MG/DL (ref 65–140)
GLUCOSE SERPL-MCNC: 139 MG/DL (ref 65–140)
GLUCOSE SERPL-MCNC: 151 MG/DL (ref 65–140)
GLUCOSE SERPL-MCNC: 222 MG/DL (ref 65–140)
HCT VFR BLD AUTO: 25 % (ref 36.5–49.3)
HGB BLD-MCNC: 7.9 G/DL (ref 12–17)
MAGNESIUM SERPL-MCNC: 2.1 MG/DL (ref 1.9–2.7)
MCH RBC QN AUTO: 31.5 PG (ref 26.8–34.3)
MCHC RBC AUTO-ENTMCNC: 31.6 G/DL (ref 31.4–37.4)
MCV RBC AUTO: 100 FL (ref 82–98)
PHOSPHATE SERPL-MCNC: 3.3 MG/DL (ref 2.7–4.5)
PLATELET # BLD AUTO: 239 THOUSANDS/UL (ref 149–390)
PMV BLD AUTO: 10.4 FL (ref 8.9–12.7)
POTASSIUM SERPL-SCNC: 4 MMOL/L (ref 3.5–5.3)
RBC # BLD AUTO: 2.51 MILLION/UL (ref 3.88–5.62)
SODIUM SERPL-SCNC: 141 MMOL/L (ref 135–147)
WBC # BLD AUTO: 5.49 THOUSAND/UL (ref 4.31–10.16)

## 2024-02-04 PROCEDURE — 99223 1ST HOSP IP/OBS HIGH 75: CPT | Performed by: INTERNAL MEDICINE

## 2024-02-04 PROCEDURE — 84100 ASSAY OF PHOSPHORUS: CPT | Performed by: NURSE PRACTITIONER

## 2024-02-04 PROCEDURE — 85027 COMPLETE CBC AUTOMATED: CPT

## 2024-02-04 PROCEDURE — 82948 REAGENT STRIP/BLOOD GLUCOSE: CPT

## 2024-02-04 PROCEDURE — 3E033GC INTRODUCTION OF OTHER THERAPEUTIC SUBSTANCE INTO PERIPHERAL VEIN, PERCUTANEOUS APPROACH: ICD-10-PCS | Performed by: INTERNAL MEDICINE

## 2024-02-04 PROCEDURE — 87081 CULTURE SCREEN ONLY: CPT | Performed by: INTERNAL MEDICINE

## 2024-02-04 PROCEDURE — 80048 BASIC METABOLIC PNL TOTAL CA: CPT | Performed by: NURSE PRACTITIONER

## 2024-02-04 PROCEDURE — 83735 ASSAY OF MAGNESIUM: CPT | Performed by: NURSE PRACTITIONER

## 2024-02-04 RX ORDER — INSULIN LISPRO 100 [IU]/ML
1-5 INJECTION, SOLUTION INTRAVENOUS; SUBCUTANEOUS
Status: DISCONTINUED | OUTPATIENT
Start: 2024-02-04 | End: 2024-02-04

## 2024-02-04 RX ORDER — MINOXIDIL 2.5 MG/1
2.5 TABLET ORAL 2 TIMES DAILY
Status: DISCONTINUED | OUTPATIENT
Start: 2024-02-04 | End: 2024-02-07

## 2024-02-04 RX ORDER — ATORVASTATIN CALCIUM 40 MG/1
40 TABLET, FILM COATED ORAL EVERY EVENING
Status: DISCONTINUED | OUTPATIENT
Start: 2024-02-04 | End: 2024-02-08 | Stop reason: HOSPADM

## 2024-02-04 RX ORDER — LABETALOL 200 MG/1
400 TABLET, FILM COATED ORAL 3 TIMES DAILY
Status: DISCONTINUED | OUTPATIENT
Start: 2024-02-04 | End: 2024-02-04

## 2024-02-04 RX ORDER — LISINOPRIL 20 MG/1
40 TABLET ORAL DAILY
Status: DISCONTINUED | OUTPATIENT
Start: 2024-02-04 | End: 2024-02-04

## 2024-02-04 RX ORDER — INSULIN LISPRO 100 [IU]/ML
1-6 INJECTION, SOLUTION INTRAVENOUS; SUBCUTANEOUS
Status: DISCONTINUED | OUTPATIENT
Start: 2024-02-05 | End: 2024-02-04

## 2024-02-04 RX ORDER — NIFEDIPINE 30 MG/1
60 TABLET, EXTENDED RELEASE ORAL 2 TIMES DAILY
Status: DISCONTINUED | OUTPATIENT
Start: 2024-02-04 | End: 2024-02-04

## 2024-02-04 RX ORDER — SACCHAROMYCES BOULARDII 250 MG
250 CAPSULE ORAL DAILY
Status: DISCONTINUED | OUTPATIENT
Start: 2024-02-04 | End: 2024-02-08 | Stop reason: HOSPADM

## 2024-02-04 RX ORDER — CLONIDINE 0.3 MG/24H
1 PATCH, EXTENDED RELEASE TRANSDERMAL WEEKLY
Status: DISCONTINUED | OUTPATIENT
Start: 2024-02-04 | End: 2024-02-08 | Stop reason: HOSPADM

## 2024-02-04 RX ORDER — FAMOTIDINE 20 MG/1
40 TABLET, FILM COATED ORAL EVERY MORNING
Status: DISCONTINUED | OUTPATIENT
Start: 2024-02-04 | End: 2024-02-08 | Stop reason: HOSPADM

## 2024-02-04 RX ORDER — CINACALCET 30 MG/1
60 TABLET, FILM COATED ORAL DAILY
Status: DISCONTINUED | OUTPATIENT
Start: 2024-02-04 | End: 2024-02-08 | Stop reason: HOSPADM

## 2024-02-04 RX ORDER — ASCORBIC ACID, THIAMINE MONONITRATE,RIBOFLAVIN, NIACINAMIDE, PYRIDOXINE HYDROCHLORIDE, FOLIC ACID, CYANOCOBALAMIN, BIOTIN, CALCIUM PANTOTHENATE, 100; 1.5; 1.7; 20; 10; 1; 6000; 150000; 5 MG/1; MG/1; MG/1; MG/1; MG/1; MG/1; UG/1; UG/1; MG/1
1 CAPSULE, LIQUID FILLED ORAL
Status: DISCONTINUED | OUTPATIENT
Start: 2024-02-04 | End: 2024-02-08 | Stop reason: HOSPADM

## 2024-02-04 RX ORDER — NIFEDIPINE 30 MG/1
60 TABLET, EXTENDED RELEASE ORAL 2 TIMES DAILY
Status: DISCONTINUED | OUTPATIENT
Start: 2024-02-04 | End: 2024-02-08 | Stop reason: HOSPADM

## 2024-02-04 RX ORDER — TRAZODONE HYDROCHLORIDE 50 MG/1
25 TABLET ORAL
Status: DISCONTINUED | OUTPATIENT
Start: 2024-02-04 | End: 2024-02-08 | Stop reason: HOSPADM

## 2024-02-04 RX ORDER — HYDRALAZINE HYDROCHLORIDE 25 MG/1
100 TABLET, FILM COATED ORAL 3 TIMES DAILY
Status: DISCONTINUED | OUTPATIENT
Start: 2024-02-04 | End: 2024-02-04

## 2024-02-04 RX ORDER — HYDRALAZINE HYDROCHLORIDE 25 MG/1
100 TABLET, FILM COATED ORAL 3 TIMES DAILY
Status: DISCONTINUED | OUTPATIENT
Start: 2024-02-04 | End: 2024-02-08 | Stop reason: HOSPADM

## 2024-02-04 RX ORDER — GABAPENTIN 100 MG/1
200 CAPSULE ORAL
Status: DISCONTINUED | OUTPATIENT
Start: 2024-02-04 | End: 2024-02-08 | Stop reason: HOSPADM

## 2024-02-04 RX ORDER — LISINOPRIL 20 MG/1
40 TABLET ORAL DAILY
Status: DISCONTINUED | OUTPATIENT
Start: 2024-02-04 | End: 2024-02-08 | Stop reason: HOSPADM

## 2024-02-04 RX ORDER — LABETALOL 100 MG/1
400 TABLET, FILM COATED ORAL 3 TIMES DAILY
Status: DISCONTINUED | OUTPATIENT
Start: 2024-02-04 | End: 2024-02-08 | Stop reason: HOSPADM

## 2024-02-04 RX ORDER — LISINOPRIL 20 MG/1
40 TABLET ORAL DAILY
Status: DISCONTINUED | OUTPATIENT
Start: 2024-02-05 | End: 2024-02-04

## 2024-02-04 RX ORDER — ESCITALOPRAM OXALATE 20 MG/1
20 TABLET ORAL DAILY
Status: DISCONTINUED | OUTPATIENT
Start: 2024-02-04 | End: 2024-02-08 | Stop reason: HOSPADM

## 2024-02-04 RX ORDER — CALCIUM ACETATE 667 MG/1
667 CAPSULE ORAL 3 TIMES DAILY
Status: DISCONTINUED | OUTPATIENT
Start: 2024-02-04 | End: 2024-02-04

## 2024-02-04 RX ORDER — HEPARIN SODIUM 5000 [USP'U]/ML
5000 INJECTION, SOLUTION INTRAVENOUS; SUBCUTANEOUS EVERY 8 HOURS SCHEDULED
Status: DISCONTINUED | OUTPATIENT
Start: 2024-02-04 | End: 2024-02-08 | Stop reason: HOSPADM

## 2024-02-04 RX ORDER — NIFEDIPINE 30 MG/1
90 TABLET, EXTENDED RELEASE ORAL 2 TIMES DAILY
Status: DISCONTINUED | OUTPATIENT
Start: 2024-02-04 | End: 2024-02-04

## 2024-02-04 RX ORDER — CHLORHEXIDINE GLUCONATE ORAL RINSE 1.2 MG/ML
15 SOLUTION DENTAL EVERY 12 HOURS SCHEDULED
Status: DISCONTINUED | OUTPATIENT
Start: 2024-02-04 | End: 2024-02-08 | Stop reason: HOSPADM

## 2024-02-04 RX ADMIN — CINACALCET 60 MG: 30 TABLET, FILM COATED ORAL at 10:17

## 2024-02-04 RX ADMIN — HEPARIN SODIUM 5000 UNITS: 5000 INJECTION INTRAVENOUS; SUBCUTANEOUS at 16:56

## 2024-02-04 RX ADMIN — NICARDIPINE HYDROCHLORIDE 5 MG/HR: 2.5 INJECTION, SOLUTION INTRAVENOUS at 03:39

## 2024-02-04 RX ADMIN — HYDRALAZINE HYDROCHLORIDE 100 MG: 25 TABLET ORAL at 16:37

## 2024-02-04 RX ADMIN — CHLORHEXIDINE GLUCONATE 15 ML: 1.2 SOLUTION ORAL at 10:20

## 2024-02-04 RX ADMIN — HYDRALAZINE HYDROCHLORIDE 100 MG: 25 TABLET ORAL at 20:15

## 2024-02-04 RX ADMIN — ESCITALOPRAM OXALATE 20 MG: 20 TABLET ORAL at 10:20

## 2024-02-04 RX ADMIN — CALCIUM ACETATE 667 MG: 667 CAPSULE ORAL at 10:20

## 2024-02-04 RX ADMIN — LABETALOL HYDROCHLORIDE 400 MG: 200 TABLET, FILM COATED ORAL at 20:15

## 2024-02-04 RX ADMIN — HEPARIN SODIUM 5000 UNITS: 5000 INJECTION INTRAVENOUS; SUBCUTANEOUS at 05:24

## 2024-02-04 RX ADMIN — LABETALOL HYDROCHLORIDE 400 MG: 200 TABLET, FILM COATED ORAL at 16:37

## 2024-02-04 RX ADMIN — NIFEDIPINE 60 MG: 30 TABLET, EXTENDED RELEASE ORAL at 10:20

## 2024-02-04 RX ADMIN — LABETALOL HYDROCHLORIDE 400 MG: 200 TABLET, FILM COATED ORAL at 10:18

## 2024-02-04 RX ADMIN — Medication 1 CAPSULE: at 16:56

## 2024-02-04 RX ADMIN — FAMOTIDINE 40 MG: 20 TABLET, FILM COATED ORAL at 10:20

## 2024-02-04 RX ADMIN — LISINOPRIL 40 MG: 20 TABLET ORAL at 10:20

## 2024-02-04 RX ADMIN — MINOXIDIL 2.5 MG: 2.5 TABLET ORAL at 20:15

## 2024-02-04 RX ADMIN — GABAPENTIN 200 MG: 100 CAPSULE ORAL at 20:29

## 2024-02-04 RX ADMIN — NIFEDIPINE 60 MG: 30 TABLET, EXTENDED RELEASE ORAL at 20:15

## 2024-02-04 RX ADMIN — ASPIRIN 81 MG: 81 TABLET, COATED ORAL at 10:20

## 2024-02-04 RX ADMIN — HYDRALAZINE HYDROCHLORIDE 100 MG: 25 TABLET ORAL at 10:19

## 2024-02-04 RX ADMIN — Medication 250 MG: at 10:20

## 2024-02-04 RX ADMIN — CHLORHEXIDINE GLUCONATE 15 ML: 1.2 SOLUTION ORAL at 20:14

## 2024-02-04 RX ADMIN — MINOXIDIL 2.5 MG: 2.5 TABLET ORAL at 12:16

## 2024-02-04 RX ADMIN — HEPARIN SODIUM 5000 UNITS: 5000 INJECTION INTRAVENOUS; SUBCUTANEOUS at 20:15

## 2024-02-04 RX ADMIN — CLONIDINE 0.3 MG: 0.3 PATCH TRANSDERMAL at 10:18

## 2024-02-04 RX ADMIN — ATORVASTATIN CALCIUM 40 MG: 40 TABLET, FILM COATED ORAL at 16:56

## 2024-02-04 NOTE — ASSESSMENT & PLAN NOTE
"Lab Results   Component Value Date    HGBA1C 6.0 (H) 01/14/2024       No results for input(s): \"POCGLU\" in the last 72 hours.    Blood Sugar Average: Last 72 hrs:    Resume home Neurontin  "

## 2024-02-04 NOTE — ASSESSMENT & PLAN NOTE
Lab Results   Component Value Date    EGFR 6 02/04/2024    EGFR 7 02/03/2024    EGFR 5 01/29/2024    CREATININE 8.56 (H) 02/04/2024    CREATININE 8.36 (H) 02/03/2024    CREATININE 10.07 (H) 01/29/2024     Patient with known history of ESRD    Stable  Nephrology consulted to assist with hemodialysis MWF

## 2024-02-04 NOTE — ASSESSMENT & PLAN NOTE
"Lab Results   Component Value Date    HGBA1C 6.0 (H) 01/14/2024       No results for input(s): \"POCGLU\" in the last 72 hours.    Blood Sugar Average: Last 72 hrs:    Place on sliding scale  Resume home meds in am  "

## 2024-02-04 NOTE — ASSESSMENT & PLAN NOTE
Arrived w/ c/o headache and /122  ED gave Labetalol 20 mg w/o any improvement so they opted to start Cardene drip  Cont Cardene drip as BP improved and headache resolved w/ improved BP  BP improved on drip will maintain for BP < 180  Pt reports being compliant w/ all meds  Will resume all home meds  Renal c/s pending

## 2024-02-04 NOTE — H&P
"[facility]  H&P  Name: Mateus Mckeon 38 y.o. male I MRN: [id number]  Unit/Bed#: ICU 08 I Date of Admission: 2/3/2024   Date of Service: 2/4/2024 I Hospital Day: 0      Assessment/Plan   * Hypertensive urgency  Assessment & Plan  Arrived w/ c/o headache and /122  ED gave Labetalol 20 mg w/o any improvement so they opted to start Cardene drip  Cont Cardene drip as BP improved and headache resolved w/ improved BP  BP improved on drip will maintain for BP < 180  Pt reports being compliant w/ all meds  Will resume all home meds  Renal c/s pending    ESRD on hemodialysis ([facility])  Assessment & Plan  Lab Results   Component Value Date    EGFR 7 02/03/2024    EGFR 5 01/29/2024    EGFR 10 01/27/2024    CREATININE 8.36 (H) 02/03/2024    CREATININE 10.07 (H) 01/29/2024    CREATININE 6.05 (H) 01/27/2024     Renal c/s to resume dialysis M-W-F    Pulmonary HTN ([facility])  Assessment & Plan  Cont all home meds    Cerebrovascular accident (CVA) of left pontine structure ([facility])  Assessment & Plan  PT/OT  Needs walker for mobility  Residual RUE weakness    Type 1 diabetes mellitus on insulin therapy ([facility])  Assessment & Plan  Lab Results   Component Value Date    HGBA1C 6.0 (H) 01/14/2024       No results for input(s): \"POCGLU\" in the last 72 hours.    Blood Sugar Average: Last 72 hrs:    Place on sliding scale  Resume home meds in am    Essential (primary) hypertension  Assessment & Plan  Resume home regimen     Hyperlipidemia  Assessment & Plan  Resume home statin    Coronary artery disease involving native coronary artery of native heart without angina pectoris  Assessment & Plan  Resume home ASA    Generalized anxiety disorder  Assessment & Plan  Cont home Lexapro    Depression  Assessment & Plan  Resume home Lexapro    Gastroesophageal reflux disease without esophagitis  Assessment & Plan  Resume home Pepcid    Diabetic neuropathy ([facility])  Assessment & Plan  Lab Results   Component Value Date    HGBA1C " "6.0 (H) 01/14/2024       No results for input(s): \"POCGLU\" in the last 72 hours.    Blood Sugar Average: Last 72 hrs:    Resume home Neurontin       History of Present Illness     HPI: Mateus Mckeon is a 38 y.o. w/ possible history ESRD on HD M-W-F, pulmonary HTN, DM1, diabetic neuropathy, GERD, depression, anxiety, CAD, HLD, HTN, anemia secondary to ESRD, recent CVA with right-sided deficit who presents to the ED w/ c/o headache and high blood pressure at home.  On arrival to the ED he was found to have a BP of 228/123.  He reports taking his evening meds about an hour prior to arrival.  He was given labetalol 20 mg x 1 without relief.  He was started on Cardene drip with immediate improvement of blood pressure and cessation of headache.    History obtained from chart review and the patient.  Review of Systems   Constitutional: Negative.    HENT: Negative.     Eyes: Negative.    Respiratory: Negative.     Cardiovascular: Negative.    Gastrointestinal: Negative.    Genitourinary: Negative.    Musculoskeletal: Negative.    Skin: Negative.    Neurological:  Positive for headaches.   Psychiatric/Behavioral: Negative.     All other systems reviewed and are negative.    Disposition: Critical care  Historical Information   Past Medical History:  No date: Acute kidney injury (HCC)  No date: Ambulates with cane  No date: Anuria  No date: Anxiety  03/31/2021: Cellulitis of right elbow  No date: Chronic kidney disease  No date: Depression  No date: Diabetes mellitus (HCC)  No date: Diarrhea  10/24/2020: Emesis  02/11/2018: End stage renal disease (HCC)      Comment:  Formatting of this note might be different from the                original. Last Assessment & Plan:  Secondary to DM.  On                nightly PD.  Followed by Ne[facility].  [facility]                [facility]                Formatting of this note might be different from the                original. Last " Assessment & Plan:  Formatting of this                note might be different from the original. 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  07/21/2015: Esophagitis  No date: Falls  No date: Gastroparesis  No date: GERD (gastroesophageal reflux disease)  2010: History of shingles  02/2018: History of transfusion      Comment:  no adverse reaction  No date: Hyperlipidemia  No date: Hyperphosphatemia  No date: Hypertension  07/15/2022: Hypoglycemia  No date: Itching  07/15/2022: Mastoiditis of right side  No date: Muscle weakness      Comment:  general unsteadiness  09/09/2019: Obesity (BMI 30.0-34.9)  10/25/2020: Orthostatic hypotension  11/20/2019: Peripheral polyneuropathy  01/26/2018: PONV (postoperative nausea and vomiting)  11/23/2020: Protein-calorie malnutrition (HCC)  07/31/2020: Recurrent peritonitis (HCC) due to peritoneal dialysis   catheter  No date: Retinopathy  No date: Seizures (HCC)      Comment:  early 2020 - one time  No date: Skin abnormality      Comment:  some dime size areas where skin was scratched from                itching  10/19/2020: Spontaneous bacterial peritonitis (HCC)  No date: Squamous cell skin cancer      Comment:  left temple  No date: Stroke (HCC)      Comment:  x2 - off balance/no driving/fatigue  No date: Swelling of both lower extremities  07/21/2022: Traumatic onycholysis  No date: Vomiting  No date: Wears glasses Past Surgical History:  9/21/2023: CARDIAC ELECTROPHYSIOLOGY PROCEDURE; N/A      Comment:  Procedure: Cardiac loop recorder explant;  Surgeon:                Parish Morgan MD;  Location: [facility];                 Service: Cardiology  05/2018: CARDIAC LOOP RECORDER  No date: COLONOSCOPY  No date: EGD  No date: EYE SURGERY; Right  02/23/2021: IR AV FISTULAGRAM/GRAFTOGRAM  1/12/2024: IR CEREBRAL ANGIOGRAPHY  1/5/2024: IR CEREBRAL ANGIOGRAPHY / INTERVENTION  02/16/2021: IR TUNNELED CENTRAL LINE  PLACEMENT  11/18/2020: IR TUNNELED DIALYSIS CATHETER PLACEMENT  02/12/2021: IR TUNNELED DIALYSIS CATHETER REMOVAL  03/11/2021: IR TUNNELED DIALYSIS CATHETER REMOVAL  12/14/2022: MOHS SURGERY; Left      Comment:  Left temple with Dr. Hassan  08/27/2018: PERITONEAL CATHETER INSERTION; N/A      Comment:  Procedure: UNROOF PD CATHETER;  Surgeon: Felipe Lindo DO;  Location: [facility];  Service: General  11/09/2020: CO ARTERIOVENOUS ANASTOMOSIS OPEN DIRECT; Left      Comment:  Procedure: CREATION FISTULA  ARTERIOVENOUS (AV) - LEFT                WRIST;  Surgeon: Placido Altamirano MD;  Location: [facility];                 Service: Vascular  04/18/2019: CO ESOPHAGOGASTRODUODENOSCOPY TRANSORAL DIAGNOSTIC; N/A      Comment:  Procedure: ESOPHAGOGASTRODUODENOSCOPY (EGD);  Surgeon:                Ale Figueroa MD;  Location: [facility];  Service:                Gastroenterology  08/06/2018: CO LAPS INSERTION TUNNELED INTRAPERITONEAL CATHETER; N/A      Comment:  Procedure: LAPAROSCOPIC PD CATHETER PLACEMENT;  Surgeon:               Felipe Lindo DO;  Location: [facility];  Service:                General  11/18/2020: CO REMOVAL TUNNELED INTRAPERITONEAL CATHETER; N/A      Comment:  Procedure: REMOVAL CATHETER PERITONEAL DIALYSIS;                 Surgeon: Abdifatah Ty MD;  Location: [facility];                 Service: General  No date: TONSILLECTOMY  No date: UPPER GASTROINTESTINAL ENDOSCOPY   Current Outpatient Medications   Medication Instructions    aspirin (ECOTRIN LOW STRENGTH) 81 mg, Oral, Daily    atorvastatin (LIPITOR) 40 mg, Oral, Every evening    b complex vitamins capsule 1 capsule, Oral, Daily before lunch    B-D ULTRAFINE III SHORT PEN 31G X 8 MM MISC USE 1 PEN NEEDLE 8 TIMES DAILY    calcium acetate (PHOSLO) 667 mg, Oral, 3 times daily    cinacalcet (SENSIPAR) 60 mg, Oral, Daily    cloNIDine (CATAPRES-TTS-3) 0.3 mg/24 hr 1 patch, Weekly    escitalopram (LEXAPRO) 20 mg, Oral, Daily    famotidine  (PEPCID) 40 mg, Oral, Every morning    gabapentin (NEURONTIN) 100 mg capsule TAKE 2 CAPSULES BY MOUTH AT BEDTIME    GLUCAGON EMERGENCY 1 MG injection     Gvoke HypoPen 1-Pack 1 MG/0.2ML SOAJ     hydrALAZINE (APRESOLINE) 100 mg, Oral, 3 times daily    hydrOXYzine HCL (ATARAX) 10 mg, Oral, Every 6 hours PRN    Insulin Disposable Pump (Omnipod 5 G6 Intro, Gen 5,) KIT No dose, route, or frequency recorded.    Insulin Disposable Pump (Omnipod 5 G6 Pod, Gen 5,) MISC No dose, route, or frequency recorded.    labetalol (NORMODYNE) 400 mg, Oral, 3 times daily    lisinopril (ZESTRIL) 40 mg, Oral, Daily    metoclopramide (REGLAN) 5 mg, Oral, 3 times daily PRN    NIFEdipine (PROCARDIA XL) 90 mg, Oral, 2 times daily    NovoLOG 100 UNIT/ML injection     ondansetron (ZOFRAN) 4 mg, Oral, Every 8 hours PRN    Patiromer Sorbitex Calcium (VELTASSA PO) 5 g, Oral, Weekly    saccharomyces boulardii (FLORASTOR) 250 mg, Oral, Daily    SPS 15 GM/60ML suspension TAKE 60 ML BY MOUTH SINGLE DOSE FOR EMERGENCY USE DURING MISSED HEMODIALYSIS    traZODone (DESYREL) 25 mg, Oral, Daily at bedtime PRN    triamcinolone (KENALOG) 0.1 % ointment Topical, 2 times daily, For up to 14 days on the itchy areas. Avoid groin, underarms and face. It is important to take at least 1 week break between uses.    Allergies   Allergen Reactions    Sulfa Antibiotics Rash      Social History     Tobacco Use    Smoking status: Former     Current packs/day: 0.00     Average packs/day: 0.5 packs/day for 12.0 years (6.0 ttl pk-yrs)     Types: Cigarettes     Start date: 2006     Quit date: 2018     Years since quittin.0    Smokeless tobacco: Never    Tobacco comments:     quit 2018   Vaping Use    Vaping status: Every Day    Substances: THC, CBD   Substance Use Topics    Alcohol use: Not Currently    Drug use: Yes     Types: Marijuana     Comment: medical marijuana    Family History   Problem Relation Age of Onset    Breast cancer Mother     Hypertension Mother      Hyperlipidemia Father     Hypertension Father     Leukemia Maternal Grandmother     Hyperlipidemia Maternal Grandfather     Hypertension Maternal Grandfather     Hyperlipidemia Paternal Grandmother     Hypertension Paternal Grandmother     Heart disease Paternal Grandfather         cardiac disorder    Diabetes Paternal Grandfather           Objective                            Vitals I/O      Most Recent Min/Max in 24hrs   Temp 98.1 °F (36.7 °C) Temp  Min: 98 °F (36.7 °C)  Max: 98.1 °F (36.7 °C)   Pulse 79 Pulse  Min: 66  Max: 80   Resp 18 Resp  Min: 17  Max: 21   /67 BP  Min: 134/66  Max: 238/122   O2 Sat 97 % SpO2  Min: 95 %  Max: 99 %      Intake/Output Summary (Last 24 hours) at 2/4/2024 0540  Last data filed at 2/4/2024 0400  Gross per 24 hour   Intake 238.33 ml   Output --   Net 238.33 ml       Diet Renal; Lo Phosphorus; No; No; Consistent Carbohydrate Diet Level 2 (5 carb servings/75 grams CHO/meal), Sodium 2 Gm    Invasive Monitoring           Physical Exam   Physical Exam  Vitals and nursing note reviewed.   Eyes:      Pupils: Pupils are equal, round, and reactive to light.   Skin:     General: Skin is warm and dry.   HENT:      Head: Normocephalic and atraumatic.      Mouth/Throat:      Mouth: Mucous membranes are moist.   Cardiovascular:      Rate and Rhythm: Normal rate and regular rhythm.      Pulses: Normal pulses.      Heart sounds: Normal heart sounds.   Abdominal: General: There is no distension.      Palpations: Abdomen is soft.      Tenderness: There is no abdominal tenderness.   Constitutional:       General: He is awake. He is not in acute distress.     Appearance: Normal appearance. He is well-developed. He is obese.   Pulmonary:      Effort: Pulmonary effort is normal.      Breath sounds: Normal breath sounds.   Psychiatric:         Behavior: Behavior is cooperative.   Neurological:      General: No focal deficit present.      Mental Status: He is alert. Mental status is at baseline.       Motor: gross motor function is at baseline for patient. Strength full and intact in all extremities.            Diagnostic Studies      EKG: NSR  Imaging: I have personally reviewed pertinent reports.       Medications:  Scheduled PRN   aspirin, 81 mg, Daily  atorvastatin, 40 mg, QPM  calcium acetate, 667 mg, TID  chlorhexidine, 15 mL, Q12H HALIE  cinacalcet, 60 mg, Daily  cloNIDine, 1 patch, Weekly  escitalopram, 20 mg, Daily  famotidine, 40 mg, QAM  gabapentin, 200 mg, HS  heparin (porcine), 5,000 Units, Q8H HALIE  hydrALAZINE, 100 mg, TID  labetalol, 400 mg, TID  lisinopril, 40 mg, Daily  multivitamin stress formula, 1 tablet, Daily Before Lunch  NIFEdipine, 90 mg, BID  saccharomyces boulardii, 250 mg, Daily      traZODone, 25 mg, HS PRN       Continuous    niCARdipine, 1-15 mg/hr, Last Rate: 5 mg/hr (02/04/24 0339)         Labs:    CBC    Recent Labs     02/03/24  2220   WBC 5.51   HGB 8.2*   HCT 25.7*        BMP    Recent Labs     02/03/24  2220   SODIUM 143   K 4.3      CO2 33*   AGAP 10   BUN 19   CREATININE 8.36*   CALCIUM 8.7       Coags    No recent results     Additional Electrolytes  No recent results       Blood Gas    No recent results  No recent results LFTs  Recent Labs     02/03/24  2220   ALT 12   AST 15   ALKPHOS 87   ALB 4.2   TBILI 0.63       Infectious  No recent results  Glucose  Recent Labs     02/03/24  2220   GLUC 176*             Non-Critical Care Time Statement: I have spent a total time of 20 minutes in caring for this patient including Diagnostic results, Prognosis, Risks and benefits of tx options, Importance of tx compliance, Counseling / Coordination of care, Documenting in the medical record, Reviewing / ordering tests, medicine, procedures  , and Obtaining or reviewing history  .   Anticipated Length of Stay is > 2 midnights  DEISI Galdamez

## 2024-02-04 NOTE — ASSESSMENT & PLAN NOTE
Lab Results   Component Value Date    HGBA1C 6.0 (H) 01/14/2024       Recent Labs     02/04/24  0723   POCGLU 124       Blood Sugar Average: Last 72 hrs:  (P) 124  Continue PTA gabapentin 200qhs

## 2024-02-04 NOTE — ASSESSMENT & PLAN NOTE
Lab Results   Component Value Date    EGFR 7 02/03/2024    EGFR 5 01/29/2024    EGFR 10 01/27/2024    CREATININE 8.36 (H) 02/03/2024    CREATININE 10.07 (H) 01/29/2024    CREATININE 6.05 (H) 01/27/2024     Renal c/s to resume dialysis M-[id number]

## 2024-02-04 NOTE — ASSESSMENT & PLAN NOTE
Lab Results   Component Value Date    HGBA1C 6.0 (H) 01/14/2024       Recent Labs     02/04/24  0723   POCGLU 124       Blood Sugar Average: Last 72 hrs:  (P) 124  Patient has insulin pump, can continue to use and assist him as needed if sugars become elevated

## 2024-02-04 NOTE — CONSULTS
Consultation - Nephrology   Mateus Mckeon 38 y.o. male MRN: [id number]  Unit/Bed#: ICU 08 Encounter: [id number]    Inpatient consult to Nephrology  Consult performed by: Anibal Stacy MD  Consult ordered by: DEISI Galdamez          History of Present Illness   Physician Requesting Consult: Daily Lentz DO  Reason for Consult / Principal Problem: ESRD on HD/accelerated hypertension    HPI: Mateus Mckeon is a 38 y.o. year old male with a PMH of diabetes mellitus type 1/hypertension/ESRD on HD//GERD/depression-anxiety recent CVA/ICH who presents with severe occipital headache found to have a blood pressure of 228/123.  Apparently has had ongoing difficulties with hypertension.  We are seeing him for ESRD on HD and hypertension    Blood pressure has been accelerated since his CVA according to the patient he denies any excessive salt intake and he gets close to his target weight on HD.            History obtained from the patient, the chart the old records which I completely reviewed        Review of systems:  General: No fevers or chills  Cardiovascular: No chest pain shortness of breath or significant leg swelling  Respiratory: No cough  Gastrointestinal: No nausea vomiting or diarrhea or abdominal pain  Genitourinary: Anuric  Neuro: Please see HPI now has persistent right-sided weakness since his stroke  All other systems were reviewed and are negative    Historical Information   Past Medical History:   Diagnosis Date    Acute kidney injury (HCC)     Ambulates with cane     Anuria     Anxiety     Cellulitis of right elbow 03/31/2021    Chronic kidney disease     Depression     Diabetes mellitus (HCC)     Diarrhea     Emesis 10/24/2020    End stage renal disease (HCC) 02/11/2018    Formatting of this note might be different from the original. Last Assessment & Plan:  Secondary to DM.  On nightly PD.  Followed by Nephro.  [facility]  Formatting of this note might be different from the original. Last Assessment & Plan:  Formatting of this note might be different from the original. 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    Esophagitis 07/21/2015    Falls     Gastroparesis     GERD (gastroesophageal reflux disease)     History of shingles 2010    History of transfusion 02/2018    no adverse reaction    Hyperlipidemia     Hyperphosphatemia     Hypertension     Hypoglycemia 07/15/2022    Itching     Mastoiditis of right side 07/15/2022    Muscle weakness     general unsteadiness    Obesity (BMI 30.0-34.9) 09/09/2019    Orthostatic hypotension 10/25/2020    Peripheral polyneuropathy 11/20/2019    PONV (postoperative nausea and vomiting) 01/26/2018    Protein-calorie malnutrition (HCC) 11/23/2020    Recurrent peritonitis (HCC) due to peritoneal dialysis catheter 07/31/2020    Retinopathy     Seizures (HCC)     early 2020 - one time    Skin abnormality     some dime size areas where skin was scratched from itching    Spontaneous bacterial peritonitis (HCC) 10/19/2020    Squamous cell skin cancer     left temple    Stroke (HCC)     x2 - off balance/no driving/fatigue    Swelling of both lower extremities     Traumatic onycholysis 07/21/2022    Vomiting     Wears glasses      Past Surgical History:   Procedure Laterality Date    CARDIAC ELECTROPHYSIOLOGY PROCEDURE N/A 9/21/2023    Procedure: Cardiac loop recorder explant;  Surgeon: Parish Morgan MD;  Location: [facility];  Service: Cardiology    CARDIAC LOOP RECORDER  05/2018    COLONOSCOPY      EGD      EYE SURGERY Right     IR AV FISTULAGRAM/GRAFTOGRAM  02/23/2021    IR CEREBRAL ANGIOGRAPHY  1/12/2024    IR CEREBRAL ANGIOGRAPHY / INTERVENTION  1/5/2024    IR TUNNELED CENTRAL LINE PLACEMENT  02/16/2021    IR TUNNELED DIALYSIS CATHETER PLACEMENT  11/18/2020    IR TUNNELED DIALYSIS CATHETER REMOVAL  02/12/2021    IR TUNNELED DIALYSIS CATHETER REMOVAL   2021    MOHS SURGERY Left 2022    Left temple with Dr. Hassan    PERITONEAL CATHETER INSERTION N/A 2018    Procedure: UNROOF PD CATHETER;  Surgeon: Felipe Lindo DO;  Location: [facility];  Service: General    GA ARTERIOVENOUS ANASTOMOSIS OPEN DIRECT Left 2020    Procedure: CREATION FISTULA  ARTERIOVENOUS (AV) - LEFT WRIST;  Surgeon: Placido Altamirano MD;  Location: [facility];  Service: Vascular    GA ESOPHAGOGASTRODUODENOSCOPY TRANSORAL DIAGNOSTIC N/A 2019    Procedure: ESOPHAGOGASTRODUODENOSCOPY (EGD);  Surgeon: Ale Figueroa MD;  Location: [facility];  Service: Gastroenterology    GA LAPS INSERTION TUNNELED INTRAPERITONEAL CATHETER N/A 2018    Procedure: LAPAROSCOPIC PD CATHETER PLACEMENT;  Surgeon: Felipe Lindo DO;  Location: [facility];  Service: General    GA REMOVAL TUNNELED INTRAPERITONEAL CATHETER N/A 2020    Procedure: REMOVAL CATHETER PERITONEAL DIALYSIS;  Surgeon: Abdifatah Ty MD;  Location: [facility];  Service: General    TONSILLECTOMY      UPPER GASTROINTESTINAL ENDOSCOPY       Social History   Social History     Substance and Sexual Activity   Alcohol Use Not Currently     Social History     Substance and Sexual Activity   Drug Use Yes    Types: Marijuana    Comment: medical marijuana     Social History     Tobacco Use   Smoking Status Former    Current packs/day: 0.00    Average packs/day: 0.5 packs/day for 12.0 years (6.0 ttl pk-yrs)    Types: Cigarettes    Start date: 2006    Quit date: 2018    Years since quittin.0   Smokeless Tobacco Never   Tobacco Comments    quit 2018     Family History   Problem Relation Age of Onset    Breast cancer Mother     Hypertension Mother     Hyperlipidemia Father     Hypertension Father     Leukemia Maternal Grandmother     Hyperlipidemia Maternal Grandfather     Hypertension Maternal Grandfather     Hyperlipidemia Paternal Grandmother     Hypertension Paternal Grandmother     Heart disease Paternal  Grandfather         cardiac disorder    Diabetes Paternal Grandfather        Meds/Allergies   all current active meds have been reviewed, current meds:   Current Facility-Administered Medications   Medication Dose Route Frequency    aspirin (ECOTRIN LOW STRENGTH) EC tablet 81 mg  81 mg Oral Daily    atorvastatin (LIPITOR) tablet 40 mg  40 mg Oral QPM    calcium acetate (PHOSLO) capsule 667 mg  667 mg Oral TID    chlorhexidine (PERIDEX) 0.12 % oral rinse 15 mL  15 mL Mouth/Throat Q12H HALIE    cinacalcet (SENSIPAR) tablet 60 mg  60 mg Oral Daily    cloNIDine (CATAPRES-TTS-3) 0.3 mg/24 hr TD weekly patch  1 patch Transdermal Weekly    escitalopram (LEXAPRO) tablet 20 mg  20 mg Oral Daily    famotidine (PEPCID) tablet 40 mg  40 mg Oral QAM    gabapentin (NEURONTIN) capsule 200 mg  200 mg Oral HS    heparin (porcine) subcutaneous injection 5,000 Units  5,000 Units Subcutaneous Q8H HALIE    hydrALAZINE (APRESOLINE) tablet 100 mg  100 mg Oral TID    insulin aspart (NovoLOG) FOR PUMP REFILLS 100 Units  100 Units Subcutaneous Insulin Pump Daily PRN    labetalol (NORMODYNE) tablet 400 mg  400 mg Oral TID    lisinopril (ZESTRIL) tablet 40 mg  40 mg Oral Daily    minoxidil (LONITEN) tablet 2.5 mg  2.5 mg Oral BID    multivitamin stress formula tablet 1 tablet  1 tablet Oral Daily Before Lunch    niCARdipine (CARDENE) 25 mg (STANDARD CONCENTRATION) in sodium chloride 0.9% 250 mL  1-15 mg/hr Intravenous Titrated    NIFEdipine (PROCARDIA XL) 24 hr tablet 60 mg  60 mg Oral BID    saccharomyces boulardii (FLORASTOR) capsule 250 mg  250 mg Oral Daily    traZODone (DESYREL) tablet 25 mg  25 mg Oral HS PRN    and PTA meds:    Medications Prior to Admission   Medication    aspirin (ECOTRIN LOW STRENGTH) 81 mg EC tablet    atorvastatin (LIPITOR) 40 mg tablet    b complex vitamins capsule    B-D ULTRAFINE III SHORT PEN 31G X 8 MM MISC    calcium acetate (PHOSLO) capsule    cinacalcet (SENSIPAR) 60 MG tablet    cloNIDine (CATAPRES-TTS-3) 0.3  mg/24 hr    escitalopram (LEXAPRO) 20 mg tablet    famotidine (PEPCID) 40 MG tablet    gabapentin (NEURONTIN) 100 mg capsule    GLUCAGON EMERGENCY 1 MG injection    Gvoke HypoPen 1-Pack 1 MG/0.2ML SOAJ    hydrALAZINE (APRESOLINE) 100 MG tablet    hydrOXYzine HCL (ATARAX) 10 mg tablet    Insulin Disposable Pump (Omnipod 5 G6 Intro, Gen 5,) KIT    Insulin Disposable Pump (Omnipod 5 G6 Pod, Gen 5,) MISC    labetalol (NORMODYNE) 200 mg tablet    lisinopril (ZESTRIL) 40 mg tablet    metoclopramide (REGLAN) 5 mg tablet    NIFEdipine (PROCARDIA XL) 90 mg 24 hr tablet    NovoLOG 100 UNIT/ML injection    ondansetron (ZOFRAN) 4 mg tablet    Patiromer Sorbitex Calcium (VELTASSA PO)    saccharomyces boulardii (FLORASTOR) 250 mg capsule    SPS 15 GM/60ML suspension    traZODone (DESYREL) 50 mg tablet    triamcinolone (KENALOG) 0.1 % ointment       Allergies   Allergen Reactions    Sulfa Antibiotics Rash       Objective     Intake/Output Summary (Last 24 hours) at 2/4/2024 1108  Last data filed at 2/4/2024 0801  Gross per 24 hour   Intake 439.16 ml   Output --   Net 439.16 ml   Patient Vitals for the past 24 hrs:   BP Temp Temp src Pulse Resp SpO2 Weight   02/04/24 0830 149/74 -- -- 75 15 96 % --   02/04/24 0815 150/72 -- -- 75 14 95 % --   02/04/24 0800 157/80 -- -- 73 14 96 % --   02/04/24 0745 146/74 -- -- 75 (!) 10 96 % --   02/04/24 0730 140/70 -- -- 74 13 97 % --   02/04/24 0715 147/74 -- -- 75 14 97 % --   02/04/24 0700 146/73 98.1 °F (36.7 °C) Axillary 76 16 97 % --   02/04/24 0645 141/74 -- -- 73 16 97 % --   02/04/24 0630 141/76 -- -- 72 13 98 % --   02/04/24 0615 140/75 -- -- 72 (!) 11 97 % --   02/04/24 0600 150/72 -- -- 75 14 97 % --   02/04/24 0545 -- -- -- -- -- -- 96.8 kg (213 lb 6.5 oz)   02/04/24 0530 148/75 -- -- 76 16 98 % --   02/04/24 0515 143/70 -- -- 82 (!) 25 98 % --   02/04/24 0500 143/68 -- -- 76 14 98 % --   02/04/24 0445 140/66 -- -- 77 19 97 % --   02/04/24 0430 140/67 -- -- 79 18 97 % --   02/04/24  0415 135/66 -- -- 79 20 96 % --   02/04/24 0400 134/66 -- -- 78 19 96 % --   02/04/24 0345 146/72 -- -- 78 17 97 % --   02/04/24 0339 150/72 -- -- 79 21 96 % --   02/04/24 0330 150/72 -- -- 79 18 97 % --   02/04/24 0315 157/82 -- -- 80 21 97 % --   02/04/24 0300 149/68 -- -- 78 17 97 % --   02/04/24 0255 153/77 -- -- 78 -- -- --   02/04/24 0245 153/77 -- -- 78 21 98 % --   02/04/24 0230 154/88 -- -- 79 -- 98 % --   02/04/24 0227 154/88 98.1 °F (36.7 °C) Oral 79 -- 99 % --   02/04/24 0200 149/74 -- -- 77 -- 95 % --   02/04/24 0115 151/79 -- -- 75 -- 99 % --   02/04/24 0100 148/74 -- -- 75 -- 96 % --   02/04/24 0045 149/76 -- -- 75 18 98 % --   02/04/24 0000 156/72 -- -- 72 18 97 % --   02/03/24 2330 166/75 -- -- 72 18 98 % --   02/03/24 2314 (!) 212/103 -- -- 66 -- -- --   02/03/24 2224 (!) 207/93 -- -- 69 18 99 % --   02/03/24 2200 (!) 238/122 -- -- -- -- -- --   02/03/24 2158 -- 98 °F (36.7 °C) -- 74 20 99 % --       Invasive Devices:      Vitals:    02/04/24 0830   BP: 149/74   Pulse: 75   Resp: 15   Temp:    SpO2: 96%     General: Well-developed well-nourished no acute distress  Skin: No acute rash  Eyes: No scleral icterus and noninjected  ENT: Normocephalic/atraumatic, mucous membranes moist  Neck: Supple, no jugular distention, no carotid bruits, 1+ cryptic, no thyromegaly and trachea midline  Back: No CVA tenderness  Chest: Clear to auscultation and percussion, no use of accessory respiratory muscles, good respiratory effort  CVS: Regular rate and rhythm without a murmur rub or gallops appreciable  Abdomen: Soft and nontender with normal bowel sounds, no pulsatile megaly or bruits appreciable  Extremities: No clubbing, no cyanosis, no distal lower extremity edema right upper extremity mild edema; left forearm AV fistula with a good bruit and thrill no significant arthritic changes 1+ dorsalis pedis pulses no femoral bruits  Neuro: Right-sided weakness, right facial droop  Psych: Alert oriented and  appropriate    Current Weight: Weight - Scale: 96.8 kg (213 lb 6.5 oz)  First Weight: Weight - Scale: 96.8 kg (213 lb 6.5 oz)    Lab Results:  I have personally reviewed pertinent labs.  CBC:   Lab Results   Component Value Date    WBC 5.49 02/04/2024    WBC 8.54 10/28/2015    RBC 2.51 (L) 02/04/2024    RBC 3.85 (L) 10/28/2015     CMP:   Lab Results   Component Value Date     10/28/2015     02/04/2024    CL 99 (L) 10/28/2023    CO2 30 02/04/2024    CO2 34 (H) 10/28/2023    ANIONGAP 9 10/28/2015    BUN 20 02/04/2024    BUN 22 10/28/2023    CREATININE 8.56 (H) 02/04/2024    CREATININE 8.90 (H) 10/28/2023    GLUCOSE 275 (H) 02/21/2020    GLUCOSE 92 10/28/2015    CALCIUM 8.3 (L) 02/04/2024    CALCIUM 8.4 (L) 10/28/2023    AST 15 02/03/2024    AST 20 10/28/2023    ALT 12 02/03/2024    ALT 14 10/28/2023    ALKPHOS 87 02/03/2024    ALKPHOS 75 10/28/2023    PROT 7.3 10/28/2015    BILITOT 0.49 10/28/2015    EGFR 6 02/04/2024    EGFR 7 (L) 10/28/2023    EGFR 6 (L) 02/04/2019     Phosphorus:   Lab Results   Component Value Date    PHOS 3.3 02/04/2024     Magnesium:   Lab Results   Component Value Date    MG 2.1 02/04/2024     Urinalysis:   Lab Results   Component Value Date    COLORU Yellow 11/14/2020    COLORU Yellow 10/28/2015    CLARITYU Clear 11/14/2020    CLARITYU Cloudy 10/28/2015    SPECGRAV 1.015 11/14/2020    SPECGRAV 1.017 10/28/2015    PHUR 7.5 11/14/2020    PHUR 6.5 09/16/2018    PHUR 5.0 10/28/2015    LEUKOCYTESUR Negative 11/14/2020    LEUKOCYTESUR Trace (A) 10/28/2015    NITRITE Negative 11/14/2020    NITRITE Negative 10/28/2015    PROTEINUA 300 (3+) (A) 10/28/2015    GLUCOSEU 500 (1/2%) (A) 11/14/2020    GLUCOSEU >=1000 (1%) (A) 10/28/2015    KETONESU Negative 11/14/2020    KETONESU Negative 10/28/2015    BILIRUBINUR Negative 11/14/2020    BILIRUBINUR INTERFERENCE (A) 10/28/2015    BLOODU Trace-Intact (A) 11/14/2020    BLOODU Negative 10/28/2015     BMP:   Lab Results   Component Value Date     GLUCOSE 275 (H) 02/21/2020    GLUCOSE 92 10/28/2015    SODIUM 141 02/04/2024    SODIUM 143 10/28/2023    CO2 30 02/04/2024    CO2 34 (H) 10/28/2023    BUN 20 02/04/2024    BUN 22 10/28/2023    CREATININE 8.56 (H) 02/04/2024    CREATININE 8.90 (H) 10/28/2023    CALCIUM 8.3 (L) 02/04/2024    CALCIUM 8.4 (L) 10/28/2023     ABGs:   Lab Results   Component Value Date    PH 7.358 02/21/2020     Radiology review:     CT scan of the head demonstrates resolution of previously identified intracranial hemorrhage no acute intracranial hemorrhage seen        Assessment/Plan   38 y.o. year old male with a PMH of diabetes mellitus type 1/hypertension/ESRD on HD//GERD/depression-anxiety recent CVA/ICH-SAH: Including multiple vessel territories who presents with severe occipital headache found to have a blood pressure of 228/123.  Apparently has had ongoing difficulties with hypertension.  We are seeing him for ESRD on HD and hypertension      1. ESRD:  MWF at [facility]  Access: Left forearm AV fistula  Next treatment: In a.m.    2. Volume/blood pressure:  Volume: Appears close to euvolemia apparently gets close to target weight  Blood pressure: Accelerated we will do secondary workup including pheochromocytoma, primary aldosterone state was negative in the past, thyroid profile was negative, will obtain renal artery duplex for completeness  Recommendation:  Renal artery duplex  Treatment: Would add minoxidil 2.5 mg twice daily on maximal hydralazine which I would continue, change Procardia XL to 60 mg twice a day maximal dose that I would use, labetalol 400 3 times daily and clonidine to continue as well.  Continue Cardene taper as able    3. Electrolytes:  All acceptable    4. MBD of ESRD:  Hyperphosphatemia: Normal at 3.3 therefore hold binder  Secondary hyperparathyroidism: Continue outpatient treatment Cinacalcet    5. Anemia of ESRD:  Current hemoglobin: 7.9  Treatment: Transfuse only do not use HETAL with recent CVA  Transfuse  "for hemoglobin less than 7.0 per primary service      6. Reason for hospitalization:  Accelerated hypertension please see above    7. Other major problems:  Type 1 diabetes mellitus/anxiety-depression/recent CVA/ICH-SAH/GERD      Case discussed thoroughly with critical care medicine as a result/consequence adding minoxidil adjusting accordingly      Portions of the record may have been created with voice recognition software.  Occasional wrong word or \"sound a like\" substitutions may have occurred due to the inherent limitations of voice recognition software.  Read the chart carefully and recognize, using context, where substitutions have occurred.    "

## 2024-02-04 NOTE — ASSESSMENT & PLAN NOTE
PT/OT ordered, patient with residual RUE weakness following recent CVA  Needs walker for mobility  OOB with assistance  Fall precautions

## 2024-02-04 NOTE — ASSESSMENT & PLAN NOTE
POA, patient presented to the emergency department complaining of headache and was found to be hypertensive to 238/122.  He was given labetalol IV 20 mg without any improvement in his blood pressure and was started on Cardene drip, transferred to ICU for further management.  Headache resolved with improved BP.  Cardene drip was stopped at 1400 on 2/4, however patient's blood pressures began to increase this morning and was restarted at 0522 on the morning of 2/5.   Goal SBP < 180  Continue home medications hydralazine 100mg TID, labetalol 400mg TID, lisinopril 40mg qd, clonidine 0.3 mg/24hr TD weekly patch  Per nephrology recommendations, decreased nifedipine from 90mg BID to 60mg BID and added minoxidil 2.5mg BID.

## 2024-02-04 NOTE — PLAN OF CARE
Problem: PAIN - ADULT  Goal: Verbalizes/displays adequate comfort level or baseline comfort level  Description: Interventions:  - Encourage patient to monitor pain and request assistance  - Assess pain using appropriate pain scale  - Administer analgesics based on type and severity of pain and evaluate response  - Implement non-pharmacological measures as appropriate and evaluate response  - Consider cultural and social influences on pain and pain management  - Notify physician/advanced practitioner if interventions unsuccessful or patient reports new pain  Outcome: Progressing     Problem: INFECTION - ADULT  Goal: Absence or prevention of progression during hospitalization  Description: INTERVENTIONS:  - Assess and monitor for signs and symptoms of infection  - Monitor lab/diagnostic results  - Monitor all insertion sites, i.e. indwelling lines, tubes, and drains  - Monitor endotracheal if appropriate and nasal secretions for changes in amount and color  - [place] appropriate cooling/warming therapies per order  - Administer medications as ordered  - Instruct and encourage patient and family to use good hand hygiene technique  - Identify and instruct in appropriate isolation precautions for identified infection/condition  Outcome: Progressing  Goal: Absence of fever/infection during neutropenic period  Description: INTERVENTIONS:  - Monitor WBC    Outcome: Progressing     Problem: SAFETY ADULT  Goal: Patient will remain free of falls  Description: INTERVENTIONS:  - Educate patient/family on patient safety including physical limitations  - Instruct patient to call for assistance with activity   - Consult OT/PT to assist with strengthening/mobility   - Keep Call bell within reach  - Keep bed low and locked with side rails adjusted as appropriate  - Keep care items and personal belongings within reach  - Initiate and maintain comfort rounds  - Make Fall Risk Sign visible to staff  - Offer Toileting every 2 Hours,  in advance of need  - Initiate/Maintain bed alarm  - Obtain necessary fall risk management equipment: socks/bracelet  - Apply yellow socks and bracelet for high fall risk patients  - Consider moving patient to room near nurses station  Outcome: Progressing  Goal: Maintain or return to baseline ADL function  Description: INTERVENTIONS:  -  Assess patient's ability to carry out ADLs; assess patient's baseline for ADL function and identify physical deficits which impact ability to perform ADLs (bathing, care of mouth/teeth, toileting, grooming, dressing, etc.)  - Assess/evaluate cause of self-care deficits   - Assess range of motion  - Assess patient's mobility; develop plan if impaired  - Assess patient's need for assistive devices and provide as appropriate  - Encourage maximum independence but intervene and supervise when necessary  - Involve family in performance of ADLs  - Assess for home care needs following discharge   - Consider OT consult to assist with ADL evaluation and planning for discharge  - Provide patient education as appropriate  Outcome: Progressing  Goal: Maintains/Returns to pre admission functional level  Description: INTERVENTIONS:  - Perform AM-PAC 6 Click Basic Mobility/ Daily Activity assessment daily.  - Set and communicate daily mobility goal to care team and patient/family/caregiver.   - Collaborate with rehabilitation services on mobility goals if consulted  - Perform Range of Motion 3 times a day.  - Reposition patient every 2 hours.  - Dangle patient 3 times a day  - Stand patient 3 times a day  - Ambulate patient 3 times a day  - Out of bed to chair 3 times a day   - Out of bed for meals 3 times a day  - Out of bed for toileting  - Record patient progress and toleration of activity level   Outcome: Progressing     Problem: DISCHARGE PLANNING  Goal: Discharge to home or other facility with appropriate resources  Description: INTERVENTIONS:  - Identify barriers to discharge w/patient and  caregiver  - Arrange for needed discharge resources and transportation as appropriate  - Identify discharge learning needs (meds, wound care, etc.)  - Arrange for interpretive services to assist at discharge as needed  - Refer to Case Management Department for coordinating discharge planning if the patient needs post-hospital services based on physician/advanced practitioner order or complex needs related to functional status, cognitive ability, or social support system  Outcome: Progressing     Problem: Knowledge Deficit  Goal: Patient/family/caregiver demonstrates understanding of disease process, treatment plan, medications, and discharge instructions  Description: Complete learning assessment and assess knowledge base.  Interventions:  - Provide teaching at level of understanding  - Provide teaching via preferred learning methods  Outcome: Progressing

## 2024-02-04 NOTE — ED PROVIDER NOTES
History  Chief Complaint   Patient presents with    Hypertension     Patient comes in reporting HA and hypertension. Patient was admitted 1/23 at [facility] for stroke. Patient has R sided deficits from stroke. Denies visual issues. Rates pain 4/10     Paulo is a 38-year-old male with history of end-stage renal disease on dialysis, hypertension, type 2 diabetes, and recent subarachnoid hemorrhage who presents with chief complaint headache.  States that his blood pressures have been well-controlled on his current regimen until this evening, when his blood pressures at home in the 220s over 120s.  He took his evening home blood pressure medications(nifedipine, hydralazine, and labetalol) around 845 this evening, his blood pressure did not respond, then he developed a 4/10 occipital, throbbing, non-radiating headache, states this is similar in location to where his headache was when he had his brain bleed last month.  Has not missed any doses of medications, last dialysis yesterday. Has chronic right peripheral vision deficits and right arm weakness from his prior bleed which are at baseline.  Denies any new neurologic deficits, chest pain, shortness of breath, nausea, or vomiting.        Prior to Admission Medications   Prescriptions Last Dose Informant Patient Reported? Taking?   B-D ULTRAFINE III SHORT PEN 31G X 8 MM MISC  Self No No   Sig: USE 1 PEN NEEDLE 8 TIMES DAILY   GLUCAGON EMERGENCY 1 MG injection  Self Yes No   Gvoke HypoPen 1-Pack 1 MG/0.2ML SOAJ  Self Yes No   Insulin Disposable Pump (Omnipod 5 G6 Intro, Gen 5,) KIT  Self Yes No   Insulin Disposable Pump (Omnipod 5 G6 Pod, Gen 5,) MISC  Self Yes No   NIFEdipine (PROCARDIA XL) 90 mg 24 hr tablet   No No   Sig: Take 1 tablet (90 mg total) by mouth 2 (two) times a day   NovoLOG 100 UNIT/ML injection  Self Yes No   Patiromer Sorbitex Calcium (VELTASSA PO)  Self Yes No   Sig: Take 5 g by mouth once a week   SPS 15 GM/60ML suspension  Self Yes No   Sig: TAKE 60 ML BY  MOUTH SINGLE DOSE FOR EMERGENCY USE DURING MISSED HEMODIALYSIS   aspirin (ECOTRIN LOW STRENGTH) 81 mg EC tablet  Self Yes No   Sig: Take 81 mg by mouth daily   atorvastatin (LIPITOR) 40 mg tablet   No No   Sig: Take 1 tablet (40 mg total) by mouth every evening   b complex vitamins capsule  Self Yes No   Sig: Take 1 capsule by mouth daily before lunch    calcium acetate (PHOSLO) capsule   No No   Sig: Take 1 capsule (667 mg total) by mouth 3 (three) times a day   cinacalcet (SENSIPAR) 60 MG tablet   No No   Sig: Take 1 tablet (60 mg total) by mouth daily   cloNIDine (CATAPRES-TTS-3) 0.3 mg/24 hr  Self Yes No   Si patch once a week   escitalopram (LEXAPRO) 20 mg tablet   No No   Sig: Take 1 tablet (20 mg total) by mouth daily   famotidine (PEPCID) 40 MG tablet   No No   Sig: TAKE 1 TABLET BY MOUTH IN THE MORNING   gabapentin (NEURONTIN) 100 mg capsule   No No   Sig: TAKE 2 CAPSULES BY MOUTH AT BEDTIME   hydrALAZINE (APRESOLINE) 100 MG tablet   No No   Sig: Take 1 tablet (100 mg total) by mouth 3 (three) times a day   hydrOXYzine HCL (ATARAX) 10 mg tablet  Self No No   Sig: Take 1 tablet (10 mg total) by mouth every 6 (six) hours as needed for itching or anxiety   labetalol (NORMODYNE) 200 mg tablet   No No   Sig: Take 2 tablets (400 mg total) by mouth 3 (three) times a day   lisinopril (ZESTRIL) 40 mg tablet   No No   Sig: Take 1 tablet (40 mg total) by mouth daily   metoclopramide (REGLAN) 5 mg tablet   No No   Sig: Take 1 tablet (5 mg total) by mouth 3 (three) times a day as needed (nausea)   ondansetron (ZOFRAN) 4 mg tablet   No No   Sig: Take 1 tablet (4 mg total) by mouth every 8 (eight) hours as needed for nausea or vomiting   saccharomyces boulardii (FLORASTOR) 250 mg capsule  Self Yes No   Sig: Take 250 mg by mouth daily   traZODone (DESYREL) 50 mg tablet  Self No No   Sig: Take 0.5 tablets (25 mg total) by mouth daily at bedtime as needed for sleep   triamcinolone (KENALOG) 0.1 % ointment  Self No No    Sig: Apply topically 2 (two) times a day For up to 14 days on the itchy areas. Avoid groin, underarms and face. It is important to take at least 1 week break between uses.      Facility-Administered Medications: None       Past Medical History:   Diagnosis Date    Acute kidney injury (HCC)     Ambulates with cane     Anuria     Anxiety     Cellulitis of right elbow 03/31/2021    Chronic kidney disease     Depression     Diabetes mellitus (HCC)     Diarrhea     Emesis 10/24/2020    End stage renal disease (HCC) 02/11/2018    Formatting of this note might be different from the original. Last Assessment & Plan:  Secondary to DM.  On nightly PD.  Followed by [facility].  Patient considering transplant for kidney and pancreas through [facility] Formatting of this note might be different from the original. Last Assessment & Plan:  Formatting of this note might be different from the original. 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    Esophagitis 07/21/2015    Falls     Gastroparesis     GERD (gastroesophageal reflux disease)     History of shingles 2010    History of transfusion 02/2018    no adverse reaction    Hyperlipidemia     Hyperphosphatemia     Hypertension     Hypoglycemia 07/15/2022    Itching     Mastoiditis of right side 07/15/2022    Muscle weakness     general unsteadiness    Obesity (BMI 30.0-34.9) 09/09/2019    Orthostatic hypotension 10/25/2020    Peripheral polyneuropathy 11/20/2019    PONV (postoperative nausea and vomiting) 01/26/2018    Protein-calorie malnutrition (HCC) 11/23/2020    Recurrent peritonitis (HCC) due to peritoneal dialysis catheter 07/31/2020    Retinopathy     Seizures ([facility])     early 2020 - one time    Skin abnormality     some dime size areas where skin was scratched from itching    Spontaneous bacterial peritonitis (HCC) 10/19/2020    Squamous cell skin cancer     left temple    Stroke (HCC)     x2 - off balance/no driving/fatigue    Swelling of  both lower extremities     Traumatic onycholysis 07/21/2022    Vomiting     Wears glasses        Past Surgical History:   Procedure Laterality Date    CARDIAC ELECTROPHYSIOLOGY PROCEDURE N/A 9/21/2023    Procedure: Cardiac loop recorder explant;  Surgeon: Parish Morgan MD;  Location: [facility];  Service: Cardiology    CARDIAC LOOP RECORDER  05/2018    COLONOSCOPY      EGD      EYE SURGERY Right     IR AV FISTULAGRAM/GRAFTOGRAM  02/23/2021    IR CEREBRAL ANGIOGRAPHY  1/12/2024    IR CEREBRAL ANGIOGRAPHY / INTERVENTION  1/5/2024    IR TUNNELED CENTRAL LINE PLACEMENT  02/16/2021    IR TUNNELED DIALYSIS CATHETER PLACEMENT  11/18/2020    IR TUNNELED DIALYSIS CATHETER REMOVAL  02/12/2021    IR TUNNELED DIALYSIS CATHETER REMOVAL  03/11/2021    MOHS SURGERY Left 12/14/2022    Left temple with Dr. Hassan    PERITONEAL CATHETER INSERTION N/A 08/27/2018    Procedure: UNROOF PD CATHETER;  Surgeon: Felipe Lindo DO;  Location: [facility];  Service: General    UT ARTERIOVENOUS ANASTOMOSIS OPEN DIRECT Left 11/09/2020    Procedure: CREATION FISTULA  ARTERIOVENOUS (AV) - LEFT WRIST;  Surgeon: Placido Altamirano MD;  Location: [facility];  Service: Vascular    UT ESOPHAGOGASTRODUODENOSCOPY TRANSORAL DIAGNOSTIC N/A 04/18/2019    Procedure: ESOPHAGOGASTRODUODENOSCOPY (EGD);  Surgeon: Ale Figueroa MD;  Location: [facility] GI [facility];  Service: Gastroenterology    UT LAPS INSERTION TUNNELED INTRAPERITONEAL CATHETER N/A 08/06/2018    Procedure: LAPAROSCOPIC PD CATHETER PLACEMENT;  Surgeon: Felipe Lindo DO;  Location[facility];  Service: General    UT REMOVAL TUNNELED INTRAPERITONEAL CATHETER N/A 11/18/2020    Procedure: REMOVAL CATHETER PERITONEAL DIALYSIS;  Surgeon: Abdifatah Ty MD;  Location: [facility];  Service: General    TONSILLECTOMY      UPPER GASTROINTESTINAL ENDOSCOPY         Family History   Problem Relation Age of Onset    Breast cancer Mother     Hypertension Mother     Hyperlipidemia Father     Hypertension Father      Leukemia Maternal Grandmother     Hyperlipidemia Maternal Grandfather     Hypertension Maternal Grandfather     Hyperlipidemia Paternal Grandmother     Hypertension Paternal Grandmother     Heart disease Paternal Grandfather         cardiac disorder    Diabetes Paternal Grandfather      I have reviewed and agree with the history as documented.    E-Cigarette/Vaping    E-Cigarette Use Current Every Day User     Comments medical marijuana      E-Cigarette/Vaping Substances    Nicotine No     THC Yes     CBD Yes     Flavoring No     Other No     Unknown No      Social History     Tobacco Use    Smoking status: Former     Current packs/day: 0.00     Average packs/day: 0.5 packs/day for 12.0 years (6.0 ttl pk-yrs)     Types: Cigarettes     Start date: 2006     Quit date: 2018     Years since quittin.0    Smokeless tobacco: Never    Tobacco comments:     quit 2018   Vaping Use    Vaping status: Every Day    Substances: THC, CBD   Substance Use Topics    Alcohol use: Not Currently    Drug use: Yes     Types: Marijuana     Comment: medical marijuana        Review of Systems   Constitutional: Negative.  Negative for chills, diaphoresis, fatigue and fever.   HENT: Negative.     Eyes:  Negative for photophobia, pain and visual disturbance.        No new vision deficits.   Respiratory: Negative.  Negative for cough and shortness of breath.    Cardiovascular: Negative.  Negative for chest pain, palpitations and leg swelling.   Gastrointestinal: Negative.  Negative for abdominal distention, abdominal pain, constipation, diarrhea, nausea and vomiting.   Endocrine: Negative.    Genitourinary: Negative.    Musculoskeletal: Negative.  Negative for arthralgias and myalgias.   Skin: Negative.  Negative for color change, pallor, rash and wound.   Allergic/Immunologic: Negative.    Neurological: Negative.  Negative for dizziness, syncope, speech difficulty, light-headedness, numbness and headaches.        Chronic right  arm/hand weakness.  No new weakness.   Hematological: Negative.  Does not bruise/bleed easily.   Psychiatric/Behavioral: Negative.  Negative for confusion.    All other systems reviewed and are negative.      Physical Exam  ED Triage Vitals   Temperature Pulse Respirations Blood Pressure SpO2   02/03/24 2158 02/03/24 2158 02/03/24 2158 02/03/24 2200 02/03/24 2158   98 °F (36.7 °C) 74 20 (!) 238/122 99 %      Temp src Heart Rate Source Patient Position - Orthostatic VS BP Location FiO2 (%)   -- 02/03/24 2330 02/03/24 2158 02/03/24 2158 --    Monitor Sitting Right arm       Pain Score       02/03/24 2158       3             Orthostatic Vital Signs  Vitals:    02/04/24 0000 02/04/24 0045 02/04/24 0100 02/04/24 0115   BP: 156/72 149/76 148/74 151/79   Pulse: 72 75 75 75   Patient Position - Orthostatic VS: Lying Lying         Physical Exam  Vitals and nursing note reviewed.   Constitutional:       General: He is not in acute distress.     Appearance: Normal appearance. He is not diaphoretic.   HENT:      Head: Normocephalic and atraumatic.      Right Ear: Tympanic membrane and external ear normal.      Left Ear: Tympanic membrane and external ear normal.      Nose: Nose normal.      Mouth/Throat:      Mouth: Mucous membranes are moist.      Pharynx: Oropharynx is clear.   Eyes:      General: No visual field deficit or scleral icterus.        Right eye: No discharge.         Left eye: No discharge.      Extraocular Movements: Extraocular movements intact.      Conjunctiva/sclera: Conjunctivae normal.      Pupils: Pupils are equal, round, and reactive to light.   Cardiovascular:      Rate and Rhythm: Normal rate and regular rhythm.      Pulses: Normal pulses.      Heart sounds: Normal heart sounds. No murmur heard.     No friction rub. No gallop.   Pulmonary:      Effort: Pulmonary effort is normal. No respiratory distress.      Breath sounds: Normal breath sounds.   Chest:      Chest wall: No tenderness.   Abdominal:       General: Abdomen is flat. Bowel sounds are normal. There is no distension.      Palpations: Abdomen is soft.      Tenderness: There is no abdominal tenderness.   Musculoskeletal:         General: Normal range of motion.      Cervical back: Normal range of motion and neck supple. No tenderness.      Right lower leg: No edema.      Left lower leg: No edema.   Lymphadenopathy:      Cervical: No cervical adenopathy.   Skin:     General: Skin is warm and dry.      Capillary Refill: Capillary refill takes less than 2 seconds.      Coloration: Skin is not pale.      Findings: No rash.   Neurological:      General: No focal deficit present.      Mental Status: He is alert and oriented to person, place, and time.      Cranial Nerves: Cranial nerves 2-12 are intact. No cranial nerve deficit, dysarthria or facial asymmetry.      Sensory: Sensation is intact. No sensory deficit.      Motor: No tremor.      Coordination: Heel to Shin Test normal.      Comments: Right arm weakness, 3/5  strength in hand, arm drifts and hits bed.(chronic per patient).  Remainder of extremities with 5/5 strength in all planes of motion.  No sensory deficits.  No objective visual field deficits.   Psychiatric:         Mood and Affect: Mood normal.         Behavior: Behavior normal.         ED Medications  Medications   niCARdipine (CARDENE) 25 mg (STANDARD CONCENTRATION) in sodium chloride 0.9% 250 mL (5 mg/hr Intravenous New Bag 2/3/24 2314)   labetalol (NORMODYNE) injection 20 mg (20 mg Intravenous Given 2/3/24 2220)   acetaminophen (TYLENOL) tablet 975 mg (975 mg Oral Given 2/3/24 2223)       Diagnostic Studies  Results Reviewed       Procedure Component Value Units Date/Time    Comprehensive metabolic panel [[id number]]  (Abnormal) Collected: 02/03/24 2220    Lab Status: Final result Specimen: Blood from Arm, Right Updated: 02/03/24 2309     Sodium 143 mmol/L      Potassium 4.3 mmol/L      Chloride 100 mmol/L      CO2 33 mmol/L      ANION  GAP 10 mmol/L      BUN 19 mg/dL      Creatinine 8.36 mg/dL      Glucose 176 mg/dL      Calcium 8.7 mg/dL      AST 15 U/L      ALT 12 U/L      Alkaline Phosphatase 87 U/L      Total Protein 6.5 g/dL      Albumin 4.2 g/dL      Total Bilirubin 0.63 mg/dL      eGFR 7 ml/min/1.73sq m     Narrative:      National Kidney Disease Foundation guidelines for Chronic Kidney Disease (CKD):     Stage 1 with normal or high GFR (GFR > 90 mL/min/1.73 square meters)    Stage 2 Mild CKD (GFR = 60-89 mL/min/1.73 square meters)    Stage 3A Moderate CKD (GFR = 45-59 mL/min/1.73 square meters)    Stage 3B Moderate CKD (GFR = 30-44 mL/min/1.73 square meters)    Stage 4 Severe CKD (GFR = 15-29 mL/min/1.73 square meters)    Stage 5 End Stage CKD (GFR <15 mL/min/1.73 square meters)  Note: GFR calculation is accurate only with a steady state creatinine    CBC and differential [[id number]]  (Abnormal) Collected: 02/03/24 2220    Lab Status: Final result Specimen: Blood from Arm, Right Updated: 02/03/24 2246     WBC 5.51 Thousand/uL      RBC 2.59 Million/uL      Hemoglobin 8.2 g/dL      Hematocrit 25.7 %      MCV 99 fL      MCH 31.7 pg      MCHC 31.9 g/dL      RDW 16.0 %      MPV 10.7 fL      Platelets 268 Thousands/uL      nRBC 0 /100 WBCs      Neutrophils Relative 54 %      Immat GRANS % 1 %      Lymphocytes Relative 27 %      Monocytes Relative 9 %      Eosinophils Relative 8 %      Basophils Relative 1 %      Neutrophils Absolute 3.00 Thousands/µL      Immature Grans Absolute 0.04 Thousand/uL      Lymphocytes Absolute 1.48 Thousands/µL      Monocytes Absolute 0.47 Thousand/µL      Eosinophils Absolute 0.45 Thousand/µL      Basophils Absolute 0.07 Thousands/µL                    CT head without contrast   Final Result by Garland Caro DO (02/03 2332)      Resolution of previously identified intracranial hemorrhage. No acute intracranial hemorrhage. See full report for detailed findings.                  Workstation performed: [id number]                Procedures  ECG 12 Lead Documentation Only    Date/Time: 2/3/2024 10:05 PM    Performed by: Yaneli Whittington DO  Authorized by: Yaneli Whittington DO    Indications / Diagnosis:  Htn  ECG reviewed by me, the ED Provider: yes    Patient location:  ED  Previous ECG:     Previous ECG:  Compared to current    Comparison ECG info:  1/29/2024    Similarity:  No change  Interpretation:     Interpretation: non-specific    Rate:     ECG rate:  74    ECG rate assessment: normal    Rhythm:     Rhythm: sinus rhythm    Ectopy:     Ectopy: none    QRS:     QRS axis:  Normal    QRS intervals:  Normal  Conduction:     Conduction: normal    ST segments:     ST segments:  Normal  T waves:     T waves: inverted      Inverted:  V6  Other findings:     Other findings: prolonged qTc interval    CriticalCare Time    Date/Time: 2/4/2024 12:30 AM    Performed by: Yaneli Whittington DO  Authorized by: Yaneli Whittington DO    Critical care provider statement:     Critical care time (minutes):  35    Critical care time was exclusive of:  Separately billable procedures and treating other patients and teaching time    Critical care was necessary to treat or prevent imminent or life-threatening deterioration of the following conditions: Hypertensive urgency requiring cardene drip, frequent neuro exams, frequent reassessment.    Critical care was time spent personally by me on the following activities:  Obtaining history from patient or surrogate, development of treatment plan with patient or surrogate, discussions with consultants, examination of patient, evaluation of patient's response to treatment, review of old charts, re-evaluation of patient's condition, ordering and review of radiographic studies, ordering and review of laboratory studies and ordering and performing treatments and interventions        ED Course  ED Course as of 02/04/24 0204   Sat Feb 03, 2024   2258 Blood pressure increased to 224/106 after  initial dose of labetalol.  After discussion with patient and attending, will start Cardene drip, admit for blood pressure control.   2259 CBC and differential(!)  Chronic anemia, at baseline, no acute abnormalities.   2300 ECG normal sinus rhythm, rate 74, without evidence of ischemia, infarct, or ectopy. qT/qTC 442/490, this mild QT prolongation appears chronic per review of previous ECGs, as per my independent interpretation.   2310 Comprehensive metabolic panel(!)  No acute changes.  CO2 and glucose within baseline range for his ESRD on dialysis.   2336 CT head without contrast  IMPRESSION:     Resolution of previously identified intracranial hemorrhage. No acute intracranial hemorrhage. See full report for detailed findings.     2347 Contacted critical care JOYCE Chayo for admission.   [place] Feb 04, 2024   0001 CC JOYCE Chayo to evaluate pt for admission.   0048 Accepted for critical care admission.                             SBIRT 20yo+      Flowsheet Row Most Recent Value   Initial Alcohol Screen: US AUDIT-C     1. How often do you have a drink containing alcohol? 0 Filed at: 02/03/2024 2157   2. How many drinks containing alcohol do you have on a typical day you are drinking?  0 Filed at: 02/03/2024 2157   3a. Male UNDER 65: How often do you have five or more drinks on one occasion? 0 Filed at: 02/03/2024 2157   3b. FEMALE Any Age, or MALE 65+: How often do you have 4 or more drinks on one occassion? 0 Filed at: 02/03/2024 2157   Audit-C Score 0 Filed at: 02/03/2024 2157   ALETHEA: How many times in the past year have you...    Used an illegal drug or used a prescription medication for non-medical reasons? Never Filed at: 02/03/2024 2157                  Medical Decision Making  Pt presents with HA and HTN despite adherence to his home HTN medications.  Did have SAH one month ago 2/2 HTN.  's/120's, remainder of VS WNL. No acute neuro deficits, LS CTA bilaterally.  Likely hypertensive urgency vs headache,  however cannot exclude ICH given history of recent ICH 2/2 HTN.  Will obtain basic labs and CT head to evaluate further, will also give IV labetalol.  However, push dose prn hypertensives may have limited effect given current high dose, multimodal antihypertensive regimen with reported compliance.  Low threshold to initiate cardene drip.  See ED course for remainder of MDM.    Problems Addressed:  Headache: acute illness or injury  Hypertensive urgency: acute illness or injury    Amount and/or Complexity of Data Reviewed  External Data Reviewed: labs, radiology, ECG and notes.  Labs: ordered. Decision-making details documented in ED Course.  Radiology: ordered. Decision-making details documented in ED Course.  ECG/medicine tests: ordered and independent interpretation performed.    Risk  OTC drugs.  Prescription drug management.  Decision regarding hospitalization.          Disposition  Final diagnoses:   Hypertensive urgency   Headache     Time reflects when diagnosis was documented in both MDM as applicable and the Disposition within this note       Time User Action Codes Description Comment    2/4/2024 12:34 AM Yaneli Whittington Add [I16.0] Hypertensive urgency     2/4/2024 12:34 AM Yaneli Whittington Add [R51.9] Headache           ED Disposition       ED Disposition   Admit    Condition   Critical    Date/Time   Sun Feb 4, 2024 0132    Comment   `Case was discussed with JOSH Medina AP, and the patient's admission status was agreed to be Admission Status: inpatient status to the service of Dr. Lentz.               Follow-up Information    None         Patient's Medications   Discharge Prescriptions    No medications on file     No discharge procedures on file.    PDMP Review         Value Time User    PDMP Reviewed  Yes 1/29/2024 12:53 AM Guanaco Collins MD             ED Provider  Attending physically available and evaluated Mateus Mckeon. I managed the patient along with the ED  Attending.    Electronically Signed by           Yaneli Whittington,   02/04/24 0008

## 2024-02-05 ENCOUNTER — APPOINTMENT (INPATIENT)
Dept: DIALYSIS | Facility: HOSPITAL | Age: 39
DRG: 304 | End: 2024-02-05
Payer: MEDICARE

## 2024-02-05 ENCOUNTER — APPOINTMENT (OUTPATIENT)
Dept: PHYSICAL THERAPY | Facility: CLINIC | Age: 39
End: 2024-02-05
Payer: MEDICARE

## 2024-02-05 LAB
ANION GAP SERPL CALCULATED.3IONS-SCNC: 10 MMOL/L
BASOPHILS # BLD AUTO: 0.06 THOUSANDS/ÂΜL (ref 0–0.1)
BASOPHILS NFR BLD AUTO: 1 % (ref 0–1)
BUN SERPL-MCNC: 30 MG/DL (ref 5–25)
CALCIUM SERPL-MCNC: 8 MG/DL (ref 8.4–10.2)
CHLORIDE SERPL-SCNC: 101 MMOL/L (ref 96–108)
CO2 SERPL-SCNC: 30 MMOL/L (ref 21–32)
CREAT SERPL-MCNC: 10.88 MG/DL (ref 0.6–1.3)
EOSINOPHIL # BLD AUTO: 0.36 THOUSAND/ÂΜL (ref 0–0.61)
EOSINOPHIL NFR BLD AUTO: 8 % (ref 0–6)
ERYTHROCYTE [DISTWIDTH] IN BLOOD BY AUTOMATED COUNT: 16.9 % (ref 11.6–15.1)
GFR SERPL CREATININE-BSD FRML MDRD: 5 ML/MIN/1.73SQ M
GLUCOSE SERPL-MCNC: 131 MG/DL (ref 65–140)
GLUCOSE SERPL-MCNC: 190 MG/DL (ref 65–140)
GLUCOSE SERPL-MCNC: 191 MG/DL (ref 65–140)
HCT VFR BLD AUTO: 23.6 % (ref 36.5–49.3)
HGB BLD-MCNC: 7.4 G/DL (ref 12–17)
IMM GRANULOCYTES # BLD AUTO: 0.03 THOUSAND/UL (ref 0–0.2)
IMM GRANULOCYTES NFR BLD AUTO: 1 % (ref 0–2)
LYMPHOCYTES # BLD AUTO: 1.22 THOUSANDS/ÂΜL (ref 0.6–4.47)
LYMPHOCYTES NFR BLD AUTO: 26 % (ref 14–44)
MAGNESIUM SERPL-MCNC: 2.2 MG/DL (ref 1.9–2.7)
MCH RBC QN AUTO: 31.4 PG (ref 26.8–34.3)
MCHC RBC AUTO-ENTMCNC: 31.4 G/DL (ref 31.4–37.4)
MCV RBC AUTO: 100 FL (ref 82–98)
MONOCYTES # BLD AUTO: 0.41 THOUSAND/ÂΜL (ref 0.17–1.22)
MONOCYTES NFR BLD AUTO: 9 % (ref 4–12)
MRSA NOSE QL CULT: NORMAL
NEUTROPHILS # BLD AUTO: 2.57 THOUSANDS/ÂΜL (ref 1.85–7.62)
NEUTS SEG NFR BLD AUTO: 55 % (ref 43–75)
NRBC BLD AUTO-RTO: 0 /100 WBCS
PHOSPHATE SERPL-MCNC: 4.3 MG/DL (ref 2.7–4.5)
PLATELET # BLD AUTO: 238 THOUSANDS/UL (ref 149–390)
PMV BLD AUTO: 9.9 FL (ref 8.9–12.7)
POTASSIUM SERPL-SCNC: 4.2 MMOL/L (ref 3.5–5.3)
RBC # BLD AUTO: 2.36 MILLION/UL (ref 3.88–5.62)
SODIUM SERPL-SCNC: 141 MMOL/L (ref 135–147)
WBC # BLD AUTO: 4.65 THOUSAND/UL (ref 4.31–10.16)

## 2024-02-05 PROCEDURE — 99233 SBSQ HOSP IP/OBS HIGH 50: CPT | Performed by: INTERNAL MEDICINE

## 2024-02-05 PROCEDURE — 84100 ASSAY OF PHOSPHORUS: CPT

## 2024-02-05 PROCEDURE — 83835 ASSAY OF METANEPHRINES: CPT | Performed by: INTERNAL MEDICINE

## 2024-02-05 PROCEDURE — 82948 REAGENT STRIP/BLOOD GLUCOSE: CPT

## 2024-02-05 PROCEDURE — 90935 HEMODIALYSIS ONE EVALUATION: CPT | Performed by: STUDENT IN AN ORGANIZED HEALTH CARE EDUCATION/TRAINING PROGRAM

## 2024-02-05 PROCEDURE — 83735 ASSAY OF MAGNESIUM: CPT

## 2024-02-05 PROCEDURE — 5A1D70Z PERFORMANCE OF URINARY FILTRATION, INTERMITTENT, LESS THAN 6 HOURS PER DAY: ICD-10-PCS | Performed by: INTERNAL MEDICINE

## 2024-02-05 PROCEDURE — 85025 COMPLETE CBC W/AUTO DIFF WBC: CPT

## 2024-02-05 PROCEDURE — 80048 BASIC METABOLIC PNL TOTAL CA: CPT

## 2024-02-05 RX ADMIN — NICARDIPINE HYDROCHLORIDE 5 MG/HR: 2.5 INJECTION, SOLUTION INTRAVENOUS at 06:18

## 2024-02-05 RX ADMIN — CHLORHEXIDINE GLUCONATE 15 ML: 1.2 SOLUTION ORAL at 08:24

## 2024-02-05 RX ADMIN — MINOXIDIL 2.5 MG: 2.5 TABLET ORAL at 17:20

## 2024-02-05 RX ADMIN — HEPARIN SODIUM 5000 UNITS: 5000 INJECTION INTRAVENOUS; SUBCUTANEOUS at 13:03

## 2024-02-05 RX ADMIN — MINOXIDIL 2.5 MG: 2.5 TABLET ORAL at 11:21

## 2024-02-05 RX ADMIN — ASPIRIN 81 MG: 81 TABLET, COATED ORAL at 08:22

## 2024-02-05 RX ADMIN — HEPARIN SODIUM 5000 UNITS: 5000 INJECTION INTRAVENOUS; SUBCUTANEOUS at 05:27

## 2024-02-05 RX ADMIN — LABETALOL HYDROCHLORIDE 400 MG: 200 TABLET, FILM COATED ORAL at 17:17

## 2024-02-05 RX ADMIN — LABETALOL HYDROCHLORIDE 400 MG: 200 TABLET, FILM COATED ORAL at 11:18

## 2024-02-05 RX ADMIN — NICARDIPINE HYDROCHLORIDE 5 MG/HR: 2.5 INJECTION, SOLUTION INTRAVENOUS at 14:04

## 2024-02-05 RX ADMIN — CHLORHEXIDINE GLUCONATE 15 ML: 1.2 SOLUTION ORAL at 20:03

## 2024-02-05 RX ADMIN — HYDRALAZINE HYDROCHLORIDE 100 MG: 25 TABLET ORAL at 11:17

## 2024-02-05 RX ADMIN — HYDRALAZINE HYDROCHLORIDE 100 MG: 25 TABLET ORAL at 20:02

## 2024-02-05 RX ADMIN — NIFEDIPINE 60 MG: 30 TABLET, EXTENDED RELEASE ORAL at 11:17

## 2024-02-05 RX ADMIN — ATORVASTATIN CALCIUM 40 MG: 40 TABLET, FILM COATED ORAL at 17:17

## 2024-02-05 RX ADMIN — LISINOPRIL 40 MG: 20 TABLET ORAL at 11:18

## 2024-02-05 RX ADMIN — Medication 250 MG: at 08:22

## 2024-02-05 RX ADMIN — GABAPENTIN 200 MG: 100 CAPSULE ORAL at 20:02

## 2024-02-05 RX ADMIN — LABETALOL HYDROCHLORIDE 400 MG: 200 TABLET, FILM COATED ORAL at 22:06

## 2024-02-05 RX ADMIN — Medication 1 CAPSULE: at 17:17

## 2024-02-05 RX ADMIN — FAMOTIDINE 40 MG: 20 TABLET, FILM COATED ORAL at 08:21

## 2024-02-05 RX ADMIN — HYDRALAZINE HYDROCHLORIDE 100 MG: 25 TABLET ORAL at 17:17

## 2024-02-05 RX ADMIN — ESCITALOPRAM OXALATE 20 MG: 20 TABLET ORAL at 08:22

## 2024-02-05 RX ADMIN — CINACALCET 60 MG: 30 TABLET, FILM COATED ORAL at 08:23

## 2024-02-05 RX ADMIN — NIFEDIPINE 60 MG: 30 TABLET, EXTENDED RELEASE ORAL at 20:02

## 2024-02-05 RX ADMIN — HEPARIN SODIUM 5000 UNITS: 5000 INJECTION INTRAVENOUS; SUBCUTANEOUS at 20:03

## 2024-02-05 NOTE — PLAN OF CARE
Problem: METABOLIC, FLUID AND ELECTROLYTES - ADULT  Goal: Electrolytes maintained within normal limits  Description: INTERVENTIONS:  - Monitor labs and assess patient for signs and symptoms of electrolyte imbalances  - Administer electrolyte replacement as ordered  - Monitor response to electrolyte replacements, including repeat lab results as appropriate  - Instruct patient on fluid and nutrition as appropriate  Outcome: Progressing     Problem: METABOLIC, FLUID AND ELECTROLYTES - ADULT  Goal: Fluid balance maintained  Description: INTERVENTIONS:  - Monitor labs   - Monitor I/O and WT  - Instruct patient on fluid and nutrition as appropriate  - Assess for signs & symptoms of volume excess or deficit  Outcome: Progressing

## 2024-02-05 NOTE — OCCUPATIONAL THERAPY NOTE
Occupational Therapy Cancelled Session    Patient Name: Mateus Mckeon  Today's Date: 2/5/2024 02/05/24 1015   Note Type   Note type Cancelled Session   Cancel Reasons Patient off floor/hemodialysis   Additional Comments OT orders received and chart review performed. Pt admitted w/ hypertensive emergency. Per ICU mobility rounds pt currently receiving bedside dialysis. Will continue to follow and see as appropriate and as schedule allows.     Leah Diaz, SVEN, OTR/L  [place] License [id number]  [place] License [id number]

## 2024-02-05 NOTE — PLAN OF CARE
Target UF Goal 3-3.5 L as tolerated. Patient dialyzing for 4 hours on 3 K bath for serum K of  4.2  per protocol. Treatment plan reviewed with Nephrology.     Post-Dialysis RN Treatment Note    Blood Pressure:  Pre 155/83 mm/Hg  Post 144/74 mmHg   EDW  93 kg    Weight:  Pre 96.1 kg   Post 92.6 kg   Mode of weight : Standing Scale   Volume Removed  3900 ml    Treatment duration 240 minutes    NS given  N/A    Treatment shortened? No   Medications given during Rx None Reported   Estimated Kt/V  None Reported   Access type: AV fistula   Access Issues: No    Report called to primary nurse   Yes     Problem: METABOLIC, FLUID AND ELECTROLYTES - ADULT  Goal: Electrolytes maintained within normal limits  Description: INTERVENTIONS:  - Monitor labs and assess patient for signs and symptoms of electrolyte imbalances  - Administer electrolyte replacement as ordered  - Monitor response to electrolyte replacements, including repeat lab results as appropriate  - Instruct patient on fluid and nutrition as appropriate  Outcome: Progressing  Goal: Fluid balance maintained  Description: INTERVENTIONS:  - Monitor labs   - Monitor I/O and WT  - Instruct patient on fluid and nutrition as appropriate  - Assess for signs & symptoms of volume excess or deficit  Outcome: Progressing

## 2024-02-05 NOTE — PROGRESS NOTES
[facility]  Progress Note  Name: Mateus Mckeon I  MRN: [id number]  Unit/Bed#: ICU 08 I Date of Admission: 2/3/2024   Date of Service: 2/5/2024 I Hospital Day: 1    Assessment/Plan   * Hypertensive urgency  Assessment & Plan  POA, patient presented to the emergency department complaining of headache and was found to be hypertensive to 238/122.  He was given labetalol IV 20 mg without any improvement in his blood pressure and was started on Cardene drip, transferred to ICU for further management.  Headache resolved with improved BP.  Cardene drip was stopped at 1400 on 2/4, however patient's blood pressures began to increase this morning and was restarted at 0522 on the morning of 2/5.   Goal SBP < 180  Continue home medications hydralazine 100mg TID, labetalol 400mg TID, lisinopril 40mg qd, clonidine 0.3 mg/24hr TD weekly patch  Per nephrology recommendations, decreased nifedipine from 90mg BID to 60mg BID and added minoxidil 2.5mg BID.    ESRD on hemodialysis (Formerly [facility])  Assessment & Plan  Lab Results   Component Value Date    EGFR 6 02/04/2024    EGFR 7 02/03/2024    EGFR 5 01/29/2024    CREATININE 8.56 (H) 02/04/2024    CREATININE 8.36 (H) 02/03/2024    CREATININE 10.07 (H) 01/29/2024     Patient with known history of ESRD    Stable  Nephrology consulted to assist with hemodialysis MWF    Type 1 diabetes mellitus on insulin therapy (Formerly [facility])  Assessment & Plan  Lab Results   Component Value Date    HGBA1C 6.0 (H) 01/14/2024       Recent Labs     02/04/24  0723   POCGLU 124       Blood Sugar Average: Last 72 hrs:  (P) 124  Patient has insulin pump, can continue to use and assist him as needed if sugars become elevated    Cerebrovascular accident (CVA) of left pontine structure (Formerly [facility])  Assessment & Plan  PT/OT ordered, patient with residual RUE weakness following recent CVA  Needs walker for mobility  OOB with assistance  Fall precautions    Diabetic neuropathy (Formerly [facility])  Assessment & Plan  Lab  Results   Component Value Date    HGBA1C 6.0 (H) 01/14/2024       Recent Labs     02/04/24  0723   POCGLU 124       Blood Sugar Average: Last 72 hrs:  (P) 124  Continue PTA gabapentin 200qhs    Gastroesophageal reflux disease without esophagitis  Assessment & Plan  Continue PTA famotidine 40mg qd    Depression  Assessment & Plan  Continue PTA escitalopram 20mg qd    Generalized anxiety disorder  Assessment & Plan  Continue PTA escitalopram 20mg qd    Coronary artery disease involving native coronary artery of native heart without angina pectoris  Assessment & Plan  Continue PTA ASA 81mg qd    Hyperlipidemia  Assessment & Plan  Continue PTA atorvastatin 40mg qd    Essential (primary) hypertension  Assessment & Plan  Medication regimen as above for hypertensive urgency    Anemia due to chronic kidney disease, on chronic dialysis (HCC)  Assessment & Plan  Lab Results   Component Value Date    EGFR 6 02/04/2024    EGFR 7 02/03/2024    EGFR 5 01/29/2024    CREATININE 8.56 (H) 02/04/2024    CREATININE 8.36 (H) 02/03/2024    CREATININE 10.07 (H) 01/29/2024     Recent Labs     02/03/24  2220 02/04/24  0936 02/05/24  0527   HGB 8.2* 7.9* 7.4*   HCT 25.7* 25.0* 23.6*     Trend Hgb, replete if <7    Pulmonary HTN (HCC)-resolved as of 2/4/2024  Assessment & Plan  Cont all home meds             Disposition: Critical care    ICU Core Measures     A: Assess, Prevent, and Manage Pain Has pain been assessed? Yes  Need for changes to pain regimen? No   B: Both SAT/SAT  N/A   C: Choice of Sedation RASS Goal: 0 Alert and Calm  Need for changes to sedation or analgesia regimen? No   D: Delirium CAM-ICU: Negative   E: Early Mobility  Plan for early mobility? Yes   F: Family Engagement Plan for family engagement today? Yes         Prophylaxis:  VTE VTE covered by:  heparin (porcine), Subcutaneous, 5,000 Units at 02/05/24 0527       Stress Ulcer  covered byfamotidine (PEPCID) 40 MG tablet [[id number]] (Long-Term Med), famotidine (PEPCID)  tablet 40 mg [[id number]]         HPI: 38-year-old with history of ESRD on hemodialysis Monday Wednesday Friday, pulmonary hypertension, type 1 diabetes, diabetic nephropathy, GERD, depression, anxiety, coronary artery disease, hyperlipidemia, hypertension, anemia secondary to ESRD, recent CVA with subarachnoid hemorrhage with persistent right-sided deficit who initially presented to the ED complaining of headache and high blood pressure at home.  He was evaluated in the ED and found to have a blood pressure of 228/123.  He reports that he has been compliant with his medication, including taking his evening meds about 1 hour prior to arrival.  He was given labetalol 20 mg IV without significant decrease in blood pressure and was therefore started on a Cardene drip, admitted to ICU for further management.    Significant 24hr Events     24hr events: Cardene drip was discontinued approximately 1400 yesterday, appears to been restarted around 0552 this morning due to persistent elevated blood pressure.  In discussion with nephrology yesterday, nifedipine decreased from 90 mg twice daily to 60 mg twice daily, minoxidil 2.5 mg twice daily added to regimen.  Patient is scheduled to undergo hemodialysis today.  Per nephrology, may want to volume challenge patient during hemodialysis as he is not at his dry weight at this time.     Subjective   Review of Systems   Objective                            Vitals I/O      Most Recent Min/Max in 24hrs   Temp 98.4 °F (36.9 °C) Temp  Min: 97.9 °F (36.6 °C)  Max: 98.4 °F (36.9 °C)   Pulse 81 Pulse  Min: 68  Max: 93   Resp 16 Resp  Min: 10  Max: 24   /70 BP  Min: 134/70  Max: 208/100   O2 Sat 95 % SpO2  Min: 93 %  Max: 99 %      Intake/Output Summary (Last 24 hours) at 2/5/2024 0755  Last data filed at 2/5/2024 0647  Gross per 24 hour   Intake 929.58 ml   Output --   Net 929.58 ml       Diet Renal; Lo Phosphorus; No; No; Consistent Carbohydrate Diet Level 2 (5 carb servings/75  grams CHO/meal), Sodium 2 Gm    Invasive Monitoring           Physical Exam   Physical Exam  Vitals and nursing note reviewed.   Eyes:      Extraocular Movements: Extraocular movements intact.      Conjunctiva/sclera: Conjunctivae normal.      Pupils: Pupils are equal, round, and reactive to light.   Skin:     General: Skin is warm and dry.   HENT:      Head: Normocephalic and atraumatic.      Mouth/Throat:      Mouth: Mucous membranes are moist.   Cardiovascular:      Rate and Rhythm: Normal rate and regular rhythm.      Pulses: Normal pulses.      Heart sounds: Normal heart sounds.   Musculoskeletal:         General: No tenderness or signs of injury.   Abdominal: General: Bowel sounds are normal. There is no distension.      Palpations: Abdomen is soft.      Tenderness: There is no abdominal tenderness.   Constitutional:       General: He is awake. He is not in acute distress.     Appearance: Normal appearance. He is well-developed. He is not ill-appearing.      Interventions: He is not sedated, not intubated and not restrained.  Pulmonary:      Effort: Pulmonary effort is normal. No respiratory distress. He is not intubated.      Breath sounds: Normal breath sounds.   Psychiatric:         Behavior: Behavior is cooperative.   Neurological:      General: No focal deficit present.      Mental Status: He is alert and oriented to person, place and time. Mental status is at baseline. He is calm.      Motor: gross motor function is at baseline for patient.            Diagnostic Studies      EKG: NSR 60s-70s on telemetry, EKG 2/3  NSR 74  Imaging: CT head w/o contrast 2/3: Resolution of previously identified intracranial hemorrhage. No acute intracranial hemorrhage.  I have personally reviewed pertinent reports.       Medications:  Scheduled PRN   aspirin, 81 mg, Daily  atorvastatin, 40 mg, QPM  b complex-vitamin C-folic acid, 1 capsule, Daily With Dinner  chlorhexidine, 15 mL, Q12H HALIE  cinacalcet, 60 mg,  Daily  cloNIDine, 1 patch, Weekly  escitalopram, 20 mg, Daily  famotidine, 40 mg, QAM  gabapentin, 200 mg, HS  heparin (porcine), 5,000 Units, Q8H HALIE  hydrALAZINE, 100 mg, TID  labetalol, 400 mg, TID  lisinopril, 40 mg, Daily  minoxidil, 2.5 mg, BID  NIFEdipine, 60 mg, BID  saccharomyces boulardii, 250 mg, Daily      insulin aspart, 100 Units, Daily PRN  traZODone, 25 mg, HS PRN       Continuous    niCARdipine, 1-15 mg/hr, Last Rate: Stopped (02/05/24 0746)         Labs:    CBC    Recent Labs     02/04/24  0936 02/05/24  0527   WBC 5.49 4.65   HGB 7.9* 7.4*   HCT 25.0* 23.6*    238     BMP    Recent Labs     02/04/24  0535 02/05/24  0527   SODIUM 141 141   K 4.0 4.2    101   CO2 30 30   AGAP 10 10   BUN 20 30*   CREATININE 8.56* 10.88*   CALCIUM 8.3* 8.0*       Coags    No recent results     Additional Electrolytes  Recent Labs     02/04/24  0535 02/05/24  0527   MG 2.1 2.2   PHOS 3.3 4.3          Blood Gas    No recent results  No recent results LFTs  Recent Labs     02/03/24  2220   ALT 12   AST 15   ALKPHOS 87   ALB 4.2   TBILI 0.63       Infectious  No recent results  Glucose  Recent Labs     02/03/24  2220 02/04/24  0535 02/05/24  0527   GLUC 176* 151* 191*                   Lashanda Wills, DO

## 2024-02-05 NOTE — PHYSICAL THERAPY NOTE
PHYSICAL THERAPY CANCELLATION NOTE          Patient Name: Mateus Mckeon  Today's Date: 2/5/2024 02/05/24 1047   Note Type   Note type Cancelled Session   Cancel Reasons Patient off floor/hemodialysis   Additional Comments PT eval orders received. Per conversations at ICU mobility rounds, pt is currently receiving dialysis at bedside and unavailable to participate in PT eval. Pt will continue to follow during current admission as appropriate and as schedule allows.         Suzan Su, PT, DPT  02/05/24

## 2024-02-05 NOTE — PROGRESS NOTES
NEPHROLOGY PROGRESS NOTE   Mateus Mckeon 38 y.o. male MRN: [id number]  Unit/Bed#: ICU 08 Encounter: [id number]  Reason for Consult: Management of ESRD    ASSESSMENT AND PLAN:  39 yo with PMH of ESRD on HD at [facility] on an MWF, hypertension with multiple admissions with fluid overload and hypertension urgency, CVA, ICH, p/w occipital headache with BP of 228/123.  Nephrology is consulted for management of ESRD    PLAN:    #ESRD on HD MWF:  Dialysis unit/days: [facility]  Access: AV graft  Currently on HD with ultrafiltration  Renal Diet  Fluid restriction 1-1.5L/d  Adjust medications to GFR<10  Avoid opioids     #Volume status/hypertension:  Volume: Hypervolemic  Blood pressure: Hypertensive, /90, goal less than 140/90  Low-sodium diet  Challenge weight on dialysis, UF 3.5 L  Fluid restriction  On clonidine patch status post nicardipine drip  Hydralazine 100 mg 3 times daily  Labetalol 400 mg tid   lisinopril 40 mg daily  Minoxidil 2.5 mg twice daily  Nifedipine 60 mg twice daily      #Secondary Hyperparasitoidism   on Cinacalcet 30 daily    # Anemia of Kidney Disease  Current hemoglobin: 7.4 mg/dL  Treatment:  Not on EPO due to CVA  Transfuse for hemoglobin less than 7.0 per primary service       # Hypertensive urgency  Multiple admissions  On nicardipine drip, discontinued this morning  UF on HD   BP meds as above    #DM  HbA1c 6  Advised to maintain a good DM control to prevent more complications   Maintain healthy diet (vegetables, fruits, whole grains, nonfat or low fat)  Weight loss  Physical activity (5 to 10 minutes to start the increase to 30 min a day)      HEMODIALYSIS PROCEDURE NOTE  The patient was seen and examined on hemodialysis. The patient is tolerating the procedure well.  Time: 3.5 hours  Sodium: 135 Blood flow: 400   Dialyzer: F160 Potassium: As per protocol Dialysate flow: 600   Access: AVF Bicarbonate: 35 Ultrafiltration goal: 3.5L   Medications on HD: none       SUBJECTIVE:  Patient seen  "and examined at bedside during dialysis, well-tolerated.  denies headache, no shortness of breath, no chest pain.      OBJECTIVE:  Current Weight: Weight - Scale: 96.8 kg (213 lb 6.5 oz)  Vitals:    02/05/24 1200   BP: (!) 187/90   Pulse: 74   Resp: 18   Temp:    SpO2:        Intake/Output Summary (Last 24 hours) at 2/5/2024 1228  Last data filed at 2/5/2024 0830  Gross per 24 hour   Intake 928.75 ml   Output --   Net 928.75 ml     Wt Readings from Last 3 Encounters:   02/04/24 96.8 kg (213 lb 6.5 oz)   01/27/24 91.3 kg (201 lb 4.5 oz)   01/23/24 94.5 kg (208 lb 5.4 oz)     Temp Readings from Last 3 Encounters:   02/05/24 98 °F (36.7 °C) (Tympanic)   01/29/24 98.3 °F (36.8 °C) (Oral)   01/27/24 97.9 °F (36.6 °C) (Oral)     BP Readings from Last 3 Encounters:   02/05/24 (!) 187/90   01/29/24 (!) 193/93   01/27/24 (!) 187/87     Pulse Readings from Last 3 Encounters:   02/05/24 74   01/29/24 79   01/27/24 69      General:  no acute distress at this time  Skin:  No acute rash  Eyes:  No scleral icterus and noninjected  ENT:  mucous membranes moist  Neck:  no carotid bruits  Chest:  Clear to auscultation percussion, good respiratory effort, no use of accessory respiratory muscles  CVS:  Regular rate and rhythm without rub   Abdomen:  soft and nontender   Extremities: lower extremity edema  Neuro:  No gross focality  Psych:  Alert , cooperative   Vascular access \"AV fistula      Medications:    Current Facility-Administered Medications:     aspirin (ECOTRIN LOW STRENGTH) EC tablet 81 mg, 81 mg, Oral, Daily, DEISI Galdamez, 81 mg at 02/05/24 0822    atorvastatin (LIPITOR) tablet 40 mg, 40 mg, Oral, QPM, DEISI Galdamez, 40 mg at 02/04/24 1656    b complex-vitamin C-folic acid (RENAL) capsule 1 capsule, 1 capsule, Oral, Daily With Dinner, Anibal Stacy MD, 1 capsule at 02/04/24 5916    chlorhexidine (PERIDEX) 0.12 % oral rinse 15 mL, 15 mL, Mouth/Throat, Q12H [facility], DEISI Galdamez, 15 mL at " 02/05/24 0824    cinacalcet (SENSIPAR) tablet 60 mg, 60 mg, Oral, Daily, DEISI Galdamez, 60 mg at 02/05/24 0823    cloNIDine (CATAPRES-TTS-3) 0.3 mg/24 hr TD weekly patch, 1 patch, Transdermal, Weekly, DEISI Galdamez, 0.3 mg at 02/04/24 1018    escitalopram (LEXAPRO) tablet 20 mg, 20 mg, Oral, Daily, DEISI Galdamez, 20 mg at 02/05/24 0822    famotidine (PEPCID) tablet 40 mg, 40 mg, Oral, QAM, DEISI Galdamez, 40 mg at 02/05/24 0821    gabapentin (NEURONTIN) capsule 200 mg, 200 mg, Oral, HS, DEISI Galdamez, 200 mg at 02/04/24 2029    heparin (porcine) subcutaneous injection 5,000 Units, 5,000 Units, Subcutaneous, Q8H HALIE, DEISI Galdamez, 5,000 Units at 02/05/24 0527    hydrALAZINE (APRESOLINE) tablet 100 mg, 100 mg, Oral, TID, Lashanda Wills DO, 100 mg at 02/05/24 1117    insulin aspart (NovoLOG) FOR PUMP REFILLS 100 Units, 100 Units, Subcutaneous Insulin Pump, Daily PRN, Lashanda Wills DO    labetalol (NORMODYNE) tablet 400 mg, 400 mg, Oral, TID, Lashanda Wills DO, 400 mg at 02/05/24 1118    lisinopril (ZESTRIL) tablet 40 mg, 40 mg, Oral, Daily, Lashanda Wills DO, 40 mg at 02/05/24 1118    minoxidil (LONITEN) tablet 2.5 mg, 2.5 mg, Oral, BID, Lashanda Wills DO, 2.5 mg at 02/05/24 1121    niCARdipine (CARDENE) 25 mg (STANDARD CONCENTRATION) in sodium chloride 0.9% 250 mL, 1-15 mg/hr, Intravenous, Titrated, DEISI Galdamez, Held at 02/05/24 0746    NIFEdipine (PROCARDIA XL) 24 hr tablet 60 mg, 60 mg, Oral, BID, Lashanda Wills DO, 60 mg at 02/05/24 1117    saccharomyces boulardii (FLORASTOR) capsule 250 mg, 250 mg, Oral, Daily, DEISI Galdamez, 250 mg at 02/05/24 0822    traZODone (DESYREL) tablet 25 mg, 25 mg, Oral, HS PRN, DEISI Galdamez    Laboratory Results:  Results from last 7 days   Lab Units 02/05/24  0527 02/04/24  0936 02/04/24  0535 02/03/24  2220   WBC Thousand/uL 4.65 5.49  --  " 5.51   HEMOGLOBIN g/dL 7.4* 7.9*  --  8.2*   HEMATOCRIT % 23.6* 25.0*  --  25.7*   PLATELETS Thousands/uL 238 239  --  268   SODIUM mmol/L 141  --  141 143   POTASSIUM mmol/L 4.2  --  4.0 4.3   CHLORIDE mmol/L 101  --  101 100   CO2 mmol/L 30  --  30 33*   BUN mg/dL 30*  --  20 19   CREATININE mg/dL 10.88*  --  8.56* 8.36*   CALCIUM mg/dL 8.0*  --  8.3* 8.7   MAGNESIUM mg/dL 2.2  --  2.1  --    PHOSPHORUS mg/dL 4.3  --  3.3  --        CT head without contrast   Final Result by Garland Caro DO (02/03 2332)      Resolution of previously identified intracranial hemorrhage. No acute intracranial hemorrhage. See full report for detailed findings.                  Workstation performed: [id number]             Portions of the record may have been created with voice recognition software. Occasional wrong word or \"sound a like\" substitutions may have occurred due to the inherent limitations of voice recognition software. Read the chart carefully and recognize, using context, where substitutions have occurred.    "

## 2024-02-05 NOTE — PLAN OF CARE
Problem: PAIN - ADULT  Goal: Verbalizes/displays adequate comfort level or baseline comfort level  Description: Interventions:  - Encourage patient to monitor pain and request assistance  - Assess pain using appropriate pain scale  - Administer analgesics based on type and severity of pain and evaluate response  - Implement non-pharmacological measures as appropriate and evaluate response  - Consider cultural and social influences on pain and pain management  - Notify physician/advanced practitioner if interventions unsuccessful or patient reports new pain  Outcome: Progressing     Problem: INFECTION - ADULT  Goal: Absence or prevention of progression during hospitalization  Description: INTERVENTIONS:  - Assess and monitor for signs and symptoms of infection  - Monitor lab/diagnostic results  - Monitor all insertion sites, i.e. indwelling lines, tubes, and drains  - Monitor endotracheal if appropriate and nasal secretions for changes in amount and color  - [place] appropriate cooling/warming therapies per order  - Administer medications as ordered  - Instruct and encourage patient and family to use good hand hygiene technique  - Identify and instruct in appropriate isolation precautions for identified infection/condition  Outcome: Progressing  Goal: Absence of fever/infection during neutropenic period  Description: INTERVENTIONS:  - Monitor WBC    Outcome: Progressing     Problem: SAFETY ADULT  Goal: Patient will remain free of falls  Description: INTERVENTIONS:  - Educate patient/family on patient safety including physical limitations  - Instruct patient to call for assistance with activity   - Consult OT/PT to assist with strengthening/mobility   - Keep Call bell within reach  - Keep bed low and locked with side rails adjusted as appropriate  - Keep care items and personal belongings within reach  - Initiate and maintain comfort rounds  - Make Fall Risk Sign visible to staff  - Apply yellow socks and bracelet  for high fall risk patients  - Consider moving patient to room near nurses station  Outcome: Progressing  Goal: Maintain or return to baseline ADL function  Description: INTERVENTIONS:  -  Assess patient's ability to carry out ADLs; assess patient's baseline for ADL function and identify physical deficits which impact ability to perform ADLs (bathing, care of mouth/teeth, toileting, grooming, dressing, etc.)  - Assess/evaluate cause of self-care deficits   - Assess range of motion  - Assess patient's mobility; develop plan if impaired  - Assess patient's need for assistive devices and provide as appropriate  - Encourage maximum independence but intervene and supervise when necessary  - Involve family in performance of ADLs  - Assess for home care needs following discharge   - Consider OT consult to assist with ADL evaluation and planning for discharge  - Provide patient education as appropriate  Outcome: Progressing  Goal: Maintains/Returns to pre admission functional level  Description: INTERVENTIONS:  - Perform AM-PAC 6 Click Basic Mobility/ Daily Activity assessment daily.  - Set and communicate daily mobility goal to care team and patient/family/caregiver.   - Collaborate with rehabilitation services on mobility goals if consulted  - Out of bed for toileting  - Record patient progress and toleration of activity level   Outcome: Progressing     Problem: DISCHARGE PLANNING  Goal: Discharge to home or other facility with appropriate resources  Description: INTERVENTIONS:  - Identify barriers to discharge w/patient and caregiver  - Arrange for needed discharge resources and transportation as appropriate  - Identify discharge learning needs (meds, wound care, etc.)  - Arrange for interpretive services to assist at discharge as needed  - Refer to Case Management Department for coordinating discharge planning if the patient needs post-hospital services based on physician/advanced practitioner order or complex needs  related to functional status, cognitive ability, or social support system  Outcome: Progressing

## 2024-02-06 ENCOUNTER — APPOINTMENT (OUTPATIENT)
Dept: PHYSICAL THERAPY | Facility: CLINIC | Age: 39
End: 2024-02-06
Payer: MEDICARE

## 2024-02-06 ENCOUNTER — APPOINTMENT (INPATIENT)
Dept: DIALYSIS | Facility: HOSPITAL | Age: 39
DRG: 304 | End: 2024-02-06
Payer: MEDICARE

## 2024-02-06 LAB
ALBUMIN SERPL BCP-MCNC: 3.6 G/DL (ref 3.5–5)
ALP SERPL-CCNC: 74 U/L (ref 34–104)
ALT SERPL W P-5'-P-CCNC: 8 U/L (ref 7–52)
ANION GAP SERPL CALCULATED.3IONS-SCNC: 9 MMOL/L
AST SERPL W P-5'-P-CCNC: 13 U/L (ref 13–39)
BASOPHILS # BLD AUTO: 0.06 THOUSANDS/ÂΜL (ref 0–0.1)
BASOPHILS NFR BLD AUTO: 1 % (ref 0–1)
BILIRUB SERPL-MCNC: 0.53 MG/DL (ref 0.2–1)
BUN SERPL-MCNC: 22 MG/DL (ref 5–25)
CALCIUM SERPL-MCNC: 8.2 MG/DL (ref 8.4–10.2)
CHLORIDE SERPL-SCNC: 102 MMOL/L (ref 96–108)
CO2 SERPL-SCNC: 32 MMOL/L (ref 21–32)
CREAT SERPL-MCNC: 7.25 MG/DL (ref 0.6–1.3)
EOSINOPHIL # BLD AUTO: 0.28 THOUSAND/ÂΜL (ref 0–0.61)
EOSINOPHIL NFR BLD AUTO: 6 % (ref 0–6)
ERYTHROCYTE [DISTWIDTH] IN BLOOD BY AUTOMATED COUNT: 17.5 % (ref 11.6–15.1)
GFR SERPL CREATININE-BSD FRML MDRD: 8 ML/MIN/1.73SQ M
GLUCOSE SERPL-MCNC: 146 MG/DL (ref 65–140)
GLUCOSE SERPL-MCNC: 152 MG/DL (ref 65–140)
GLUCOSE SERPL-MCNC: 171 MG/DL (ref 65–140)
GLUCOSE SERPL-MCNC: 194 MG/DL (ref 65–140)
HCT VFR BLD AUTO: 25 % (ref 36.5–49.3)
HGB BLD-MCNC: 7.9 G/DL (ref 12–17)
IMM GRANULOCYTES # BLD AUTO: 0.02 THOUSAND/UL (ref 0–0.2)
IMM GRANULOCYTES NFR BLD AUTO: 0 % (ref 0–2)
LYMPHOCYTES # BLD AUTO: 1.22 THOUSANDS/ÂΜL (ref 0.6–4.47)
LYMPHOCYTES NFR BLD AUTO: 24 % (ref 14–44)
MAGNESIUM SERPL-MCNC: 2.1 MG/DL (ref 1.9–2.7)
MCH RBC QN AUTO: 31.5 PG (ref 26.8–34.3)
MCHC RBC AUTO-ENTMCNC: 31.6 G/DL (ref 31.4–37.4)
MCV RBC AUTO: 100 FL (ref 82–98)
MONOCYTES # BLD AUTO: 0.56 THOUSAND/ÂΜL (ref 0.17–1.22)
MONOCYTES NFR BLD AUTO: 11 % (ref 4–12)
NEUTROPHILS # BLD AUTO: 2.91 THOUSANDS/ÂΜL (ref 1.85–7.62)
NEUTS SEG NFR BLD AUTO: 58 % (ref 43–75)
NRBC BLD AUTO-RTO: 0 /100 WBCS
PHOSPHATE SERPL-MCNC: 3.7 MG/DL (ref 2.7–4.5)
PLATELET # BLD AUTO: 231 THOUSANDS/UL (ref 149–390)
PMV BLD AUTO: 9.5 FL (ref 8.9–12.7)
POTASSIUM SERPL-SCNC: 4.3 MMOL/L (ref 3.5–5.3)
PROT SERPL-MCNC: 5.6 G/DL (ref 6.4–8.4)
RBC # BLD AUTO: 2.51 MILLION/UL (ref 3.88–5.62)
SODIUM SERPL-SCNC: 143 MMOL/L (ref 135–147)
WBC # BLD AUTO: 5.05 THOUSAND/UL (ref 4.31–10.16)

## 2024-02-06 PROCEDURE — 82948 REAGENT STRIP/BLOOD GLUCOSE: CPT

## 2024-02-06 PROCEDURE — 97163 PT EVAL HIGH COMPLEX 45 MIN: CPT

## 2024-02-06 PROCEDURE — 99232 SBSQ HOSP IP/OBS MODERATE 35: CPT | Performed by: STUDENT IN AN ORGANIZED HEALTH CARE EDUCATION/TRAINING PROGRAM

## 2024-02-06 PROCEDURE — 85025 COMPLETE CBC W/AUTO DIFF WBC: CPT

## 2024-02-06 PROCEDURE — 97530 THERAPEUTIC ACTIVITIES: CPT

## 2024-02-06 PROCEDURE — 99233 SBSQ HOSP IP/OBS HIGH 50: CPT | Performed by: INTERNAL MEDICINE

## 2024-02-06 PROCEDURE — 84100 ASSAY OF PHOSPHORUS: CPT

## 2024-02-06 PROCEDURE — 97167 OT EVAL HIGH COMPLEX 60 MIN: CPT

## 2024-02-06 PROCEDURE — 97110 THERAPEUTIC EXERCISES: CPT

## 2024-02-06 PROCEDURE — 80053 COMPREHEN METABOLIC PANEL: CPT

## 2024-02-06 PROCEDURE — 83735 ASSAY OF MAGNESIUM: CPT

## 2024-02-06 RX ORDER — ACETAMINOPHEN 325 MG/1
650 TABLET ORAL ONCE
Status: COMPLETED | OUTPATIENT
Start: 2024-02-06 | End: 2024-02-06

## 2024-02-06 RX ADMIN — HEPARIN SODIUM 5000 UNITS: 5000 INJECTION INTRAVENOUS; SUBCUTANEOUS at 20:09

## 2024-02-06 RX ADMIN — ACETAMINOPHEN 650 MG: 325 TABLET, FILM COATED ORAL at 15:34

## 2024-02-06 RX ADMIN — CHLORHEXIDINE GLUCONATE 15 ML: 1.2 SOLUTION ORAL at 08:27

## 2024-02-06 RX ADMIN — LABETALOL HYDROCHLORIDE 400 MG: 200 TABLET, FILM COATED ORAL at 08:27

## 2024-02-06 RX ADMIN — HEPARIN SODIUM 5000 UNITS: 5000 INJECTION INTRAVENOUS; SUBCUTANEOUS at 05:29

## 2024-02-06 RX ADMIN — NICARDIPINE HYDROCHLORIDE 5 MG/HR: 2.5 INJECTION, SOLUTION INTRAVENOUS at 04:16

## 2024-02-06 RX ADMIN — ATORVASTATIN CALCIUM 40 MG: 40 TABLET, FILM COATED ORAL at 18:03

## 2024-02-06 RX ADMIN — GABAPENTIN 200 MG: 100 CAPSULE ORAL at 20:09

## 2024-02-06 RX ADMIN — ESCITALOPRAM OXALATE 20 MG: 20 TABLET ORAL at 08:28

## 2024-02-06 RX ADMIN — MINOXIDIL 2.5 MG: 2.5 TABLET ORAL at 18:03

## 2024-02-06 RX ADMIN — Medication 250 MG: at 08:28

## 2024-02-06 RX ADMIN — Medication 1 CAPSULE: at 15:34

## 2024-02-06 RX ADMIN — LISINOPRIL 40 MG: 20 TABLET ORAL at 08:28

## 2024-02-06 RX ADMIN — MINOXIDIL 2.5 MG: 2.5 TABLET ORAL at 08:29

## 2024-02-06 RX ADMIN — HYDRALAZINE HYDROCHLORIDE 100 MG: 25 TABLET ORAL at 08:28

## 2024-02-06 RX ADMIN — ASPIRIN 81 MG: 81 TABLET, COATED ORAL at 08:28

## 2024-02-06 RX ADMIN — LABETALOL HYDROCHLORIDE 400 MG: 200 TABLET, FILM COATED ORAL at 20:09

## 2024-02-06 RX ADMIN — CHLORHEXIDINE GLUCONATE 15 ML: 1.2 SOLUTION ORAL at 20:09

## 2024-02-06 RX ADMIN — NIFEDIPINE 60 MG: 30 TABLET, EXTENDED RELEASE ORAL at 20:09

## 2024-02-06 RX ADMIN — HYDRALAZINE HYDROCHLORIDE 100 MG: 25 TABLET ORAL at 20:09

## 2024-02-06 RX ADMIN — HEPARIN SODIUM 5000 UNITS: 5000 INJECTION INTRAVENOUS; SUBCUTANEOUS at 15:34

## 2024-02-06 RX ADMIN — FAMOTIDINE 40 MG: 20 TABLET, FILM COATED ORAL at 08:28

## 2024-02-06 RX ADMIN — HYDRALAZINE HYDROCHLORIDE 100 MG: 25 TABLET ORAL at 15:33

## 2024-02-06 RX ADMIN — NIFEDIPINE 60 MG: 30 TABLET, EXTENDED RELEASE ORAL at 08:28

## 2024-02-06 RX ADMIN — LABETALOL HYDROCHLORIDE 400 MG: 200 TABLET, FILM COATED ORAL at 15:33

## 2024-02-06 RX ADMIN — NICARDIPINE HYDROCHLORIDE 5 MG/HR: 2.5 INJECTION, SOLUTION INTRAVENOUS at 12:46

## 2024-02-06 RX ADMIN — CINACALCET 60 MG: 30 TABLET, FILM COATED ORAL at 08:29

## 2024-02-06 NOTE — PLAN OF CARE
Problem: PHYSICAL THERAPY ADULT  Goal: Performs mobility at highest level of function for planned discharge setting.  See evaluation for individualized goals.  Description: Treatment/Interventions: Functional transfer training, LE strengthening/ROM, Elevations, Therapeutic exercise, Endurance training, Patient/family training, Equipment eval/education, Bed mobility, Gait training, Compensatory technique education          See flowsheet documentation for full assessment, interventions and recommendations.  2/6/2024 1410 by Suzan Su PT  Note: Prognosis: Good  Problem List: Decreased strength, Impaired balance, Decreased mobility, Decreased cognition  Assessment: Mateus Mckeon is a 38 y.o. Male who presents to [facility] on 2/3/24 due to hypertension and diagnosis of hypertensive urgency. Orders for PT eval and treat received, w/ activity orders of ambulate patient. Comorbidities affecting pt's functional mobility at time of evaluation include: diabetic neuropathy, multiple CVAs, ELIAS, HTN, DM 1, ataxia. Personal factors affecting DC include: lives in 2 story house, ambulating w/ assistive device, stairs to enter home, and positive fall history. PTA pt was ambulating ind w/ rollator walker, and w/ 1 fall(s) in the previous 6 months. Upon evaluation, pt presents w/ the following deficits: impaired strength, impaired balance, decreased safety awareness, and gait deviations. Pt currently requires  supervision for transfers, supervision w/ rollator walker for ambulation, supervision w/ L UE support for stair negotiation. Pt's clinical presentation is unstable/unpredictable due to poor blood pressure control, need for increased assistance w/ functional mobility compared to baseline, need for input for mobility technique, recent readmission w/in 30 days, recent h/o falls, ongoing medical management. From a PT/mobility standpoint given the above findings, DC recommendation is level: III (Minimum Rehab Resource  Intensity). During current admission, pt will benefit from continued skilled inpatient PT in the acute care setting in order to address the above deficits and to maximize function and mobility prior to DC from acute care.        Rehab Resource Intensity Level, PT: III (Minimum Resource Intensity)    See flowsheet documentation for full assessment.

## 2024-02-06 NOTE — ASSESSMENT & PLAN NOTE
Lab Results   Component Value Date    EGFR 8 02/06/2024    EGFR 5 02/05/2024    EGFR 6 02/04/2024    CREATININE 7.25 (H) 02/06/2024    CREATININE 10.88 (H) 02/05/2024    CREATININE 8.56 (H) 02/04/2024     Patient with known history of ESRD    Stable  Nephrology consulted to assist with hemodialysis MWF  Per nephrology recs, currently holding phoslo in the setting of normal-range phosphorus

## 2024-02-06 NOTE — CASE MANAGEMENT
Case Management Assessment & Discharge Planning Note    Patient name Mateus Mckeon  Location ICU 08/ICU 08 MRN [id number]  : 1985 Date 2024       Current Admission Date: 2/3/2024  Current Admission Diagnosis:Hypertensive urgency   Patient Active Problem List    Diagnosis Date Noted    Primary hypertension 2024    Subarachnoid hemorrhage (HCC) 2024    Anemia due to chronic kidney disease, on chronic dialysis (HCC) 2024    Type 1 diabetes mellitus on insulin therapy (HCC) 2023    Hyperlipidemia 2023    Insomnia 2023    RACHEAL (obstructive sleep apnea)     Status post placement of implantable loop recorder 2022    Coronary artery disease involving native coronary artery of native heart without angina pectoris 2022    Hypothyroidism 2022    Cerebellar stroke syndrome 2022    Anemia in chronic kidney disease 2022    Essential (primary) hypertension 2022    Mild episode of recurrent major depressive disorder ([facility]) 2021    Generalized anxiety disorder 2021    Depression 04/15/2021    Medical cannabis use 04/15/2021    Tubulovillous adenoma of colon 2021    Gastroesophageal reflux disease without esophagitis 2021    ESRD on hemodialysis ([facility]) 2020    Secondary hyperparathyroidism (HCC) 10/23/2020    Diabetic neuropathy ([facility]) 2020    Provoked seizure ([facility]) 2020    Asterixis 2020    Foot drop, bilateral 2020    Impaired mobility and ADLs 2020    Vertigo 2020    Multiple pulmonary nodules 2019    Gastroparesis diabeticorum  2019    Obesity (BMI 30.0-34.9) 2019    Environmental and seasonal allergies 2019    Strabismus 2018    Dyslipidemia 2018    Heterozygous for prothrombin [id number] mutation (HCC) 2018    Renovascular hypertension 2018    Left atrial dilation 2018    Persistent proteinuria 2018    Anemia in  chronic kidney disease, on chronic dialysis (HCC) 02/10/2018    Hypertensive urgency 02/09/2018    Homozygous MTHFR mutation C677T 02/05/2018    Hyperphosphatemia 01/29/2018    Vitamin D deficiency 01/29/2018    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,   01/28/2018    Binocular visual disturbance 01/28/2018    History of lacunar cerebrovascular accident (CVA) 01/22/2018    Type 1 diabetes mellitus (HCC) 06/19/2017    Ataxia 07/21/2015    Cerebrovascular accident (CVA) of left pontine structure (HCC) 07/21/2015      LOS (days): 2  Geometric Mean LOS (GMLOS) (days): 3  Days to GMLOS:0.6     OBJECTIVE:  PATIENT READMITTED TO HOSPITAL  Risk of Unplanned Readmission Score: 41.41     Current admission status: Inpatient    Preferred Pharmacy:   [facility] - [place], [place] - [address] [address]  Phone: [phone] Fax: [phone]    [id number] [address] [address] [facility] [address]  Phone: [phone] Fax: [phone]    Primary Care Provider: Dania Sher DO    Primary Insurance: MEDICARE  Secondary Insurance: [employer] [facility]    ASSESSMENT:  Active Health Care Proxies       Francine Mckeon [facility] Care Representative - Mother   Primary Phone: [phone] (Mobile)  Home Phone: [phone]                 Patient Information  Admitted from:: Home  Mental Status: Alert  During Assessment patient was accompanied by: Parent (Francine)  Assessment information provided by:: Patient, Parent  Primary Caregiver: Self  Support Systems: Self, Parent  County of Residence: [place]  What city do you live in?: [facility] [place]  Home entry access options. Select all that apply.: Stairs  Number of steps to enter home.: 2  Type of Current Residence: 2 story home  Upon entering residence, is there a bedroom on the main floor (no further steps)?: No  A bedroom is located  on the following floor levels of residence (select all that apply):: 2nd Floor  Upon entering residence, is there a bathroom on the main floor (no further steps)?: Yes (half bathroom on first and full on second)  Number of steps to 2nd floor from main floor: One Flight  Living Arrangements: Lives w/ Parent(s) (Francine and Harsha)    Activities of Daily Living Prior to Admission  Functional Status: Independent  Completes ADLs independently?: Yes (Does take more time and needs help with tying shoes)  Ambulates independently?: Yes  Does patient use assisted devices?: Yes  Assisted Devices (DME) used: Other (Comment), Rollator (shower bench)  Does patient currently own DME?: Yes  What DME does the patient currently own?: Other (Comment), Straight Cane, Rollator (shower bench)  Does patient have a history of Outpatient Therapy (PT/OT)?: Yes (Currently in PT. Needs OT - no opening until March @ [address])  Does the patient have a history of Short-Term Rehab?: No  Does patient have a history of HHC?: No  Does patient currently have HHC?: No    Patient Information Continued  Income Source: SSI/SSD  Does patient have prescription coverage?: Yes  Does patient receive dialysis treatments?: Yes ([facility] MWF 7 a.m. chair time)  Does patient have a history of substance abuse?: No  Does patient have a history of Mental Health Diagnosis?: Yes  Is patient receiving treatment for mental health?: Yes  Has patient received inpatient treatment related to mental health in the last 2 years?: No    Means of Transportation  Means of Transport to [facility]:: Family transport    Housing Stability: Unknown (2/6/2024)    Housing Stability Vital Sign     Unable to Pay for Housing in the Last Year: No     Number of Places Lived in the Last Year: Not on file     Unstable Housing in the Last Year: No   Food Insecurity: No Food Insecurity (2/6/2024)    Hunger Vital Sign     Worried About Running Out of Food in the Last Year: Never true     Ran Out of Food in the  Last Year: Never true   Transportation Needs: No Transportation Needs (2/6/2024)    PRAPARE - Transportation     Lack of Transportation (Medical): No     Lack of Transportation (Non-Medical): No   Utilities: Not At Risk (2/6/2024)    [facility] Utilities     Threatened with loss of utilities: No       DISCHARGE DETAILS:    Discharge planning discussed with:: Pt and mother, Francine    Contacts  Patient Contacts: Francine Mckeon  Relationship to Patient:: Family  Contact Method: In Person  Reason/Outcome: Continuity of Care, Emergency Contact, Referral, Discharge Planning    Other Referral/Resources/Interventions Provided:  Interventions: Other (Specify)  Referral Comments: CM met with pt and his mother, Francine, at bedside. Introduced self/role with dcp. Pt currently in OP PT at [address], but simba can't see OT until March. Pt has HD MWF at [facility]. 7 a.m. chair time. Pt needs some help with tying his shoes, but otherwise independent with ADLs (just takes more time) and ambulates with a Rollator. PT/OT to eval. Aware CM will f/u with recommendations.

## 2024-02-06 NOTE — PHYSICAL THERAPY NOTE
PHYSICAL THERAPY EVALUATION NOTE          Patient Name: Mateus Mckeon  Today's Date: 2/6/2024        AGE:   38 y.o.  Mrn:   [id number]  ADMIT DX:  Hypertension [I10]  Hypertensive urgency [I16.0]  ESRD on hemodialysis (HCC) [N18.6, Z99.2]  Headache [R51.9]  Cerebrovascular accident (CVA) of left pontine structure (HCC) [I63.9]    Past Medical History:  Past Medical History:   Diagnosis Date    Acute kidney injury (HCC)     Ambulates with cane     Anuria     Anxiety     Cellulitis of right elbow 03/31/2021    Chronic kidney disease     Depression     Diabetes mellitus (HCC)     Diarrhea     Emesis 10/24/2020    End stage renal disease (HCC) 02/11/2018    Formatting of this note might be different from the original. Last Assessment & Plan:  Secondary to DM.  On nightly PD.  Followed by Nephro.  [facility] Formatting of this note might be different from the original. Last Assessment & Plan:  Formatting of this note might be different from the original. 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    Esophagitis 07/21/2015    Falls     Gastroparesis     GERD (gastroesophageal reflux disease)     History of shingles 2010    History of transfusion 02/2018    no adverse reaction    Hyperlipidemia     Hyperphosphatemia     Hypertension     Hypoglycemia 07/15/2022    Itching     Mastoiditis of right side 07/15/2022    Muscle weakness     general unsteadiness    Obesity (BMI 30.0-34.9) 09/09/2019    Orthostatic hypotension 10/25/2020    Peripheral polyneuropathy 11/20/2019    PONV (postoperative nausea and vomiting) 01/26/2018    Protein-calorie malnutrition (HCC) 11/23/2020    Recurrent peritonitis (HCC) due to peritoneal dialysis catheter 07/31/2020    Retinopathy     Seizures (HCC)     early 2020 - one time    Skin abnormality     some dime size areas where skin was  scratched from itching    Spontaneous bacterial peritonitis (HCC) 10/19/2020    Squamous cell skin cancer     left temple    Stroke (HCC)     x2 - off balance/no driving/fatigue    Swelling of both lower extremities     Traumatic onycholysis 07/21/2022    Vomiting     Wears glasses        Past Surgical History:  Past Surgical History:   Procedure Laterality Date    CARDIAC ELECTROPHYSIOLOGY PROCEDURE N/A 9/21/2023    Procedure: Cardiac loop recorder explant;  Surgeon: Parish Morgan MD;  Location: [facility] CARDIAC CATH LAB;  Service: Cardiology    CARDIAC LOOP RECORDER  05/2018    COLONOSCOPY      EGD      EYE SURGERY Right     IR AV FISTULAGRAM/GRAFTOGRAM  02/23/2021    IR CEREBRAL ANGIOGRAPHY  1/12/2024    IR CEREBRAL ANGIOGRAPHY / INTERVENTION  1/5/2024    IR TUNNELED CENTRAL LINE PLACEMENT  02/16/2021    IR TUNNELED DIALYSIS CATHETER PLACEMENT  11/18/2020    IR TUNNELED DIALYSIS CATHETER REMOVAL  02/12/2021    IR TUNNELED DIALYSIS CATHETER REMOVAL  03/11/2021    MOHS SURGERY Left 12/14/2022    Left temple with Dr. Hassan    PERITONEAL CATHETER INSERTION N/A 08/27/2018    Procedure: UNROOF PD CATHETER;  Surgeon: Felipe Lindo DO;  Location: [facility] Main OR;  Service: General    IL ARTERIOVENOUS ANASTOMOSIS OPEN DIRECT Left 11/09/2020    Procedure: CREATION FISTULA  ARTERIOVENOUS (AV) - LEFT WRIST;  Surgeon: Placido Altamirano MD;  Location: [facility];  Service: Vascular    IL ESOPHAGOGASTRODUODENOSCOPY TRANSORAL DIAGNOSTIC N/A 04/18/2019    Procedure: ESOPHAGOGASTRODUODENOSCOPY (EGD);  Surgeon: Ale Figueroa MD;  Location: [facility] GI LAB;  Service: Gastroenterology    IL LAPS INSERTION TUNNELED INTRAPERITONEAL CATHETER N/A 08/06/2018    Procedure: LAPAROSCOPIC PD CATHETER PLACEMENT;  Surgeon: Felipe Lnido DO;  Location: [facility];  Service: General    IL REMOVAL TUNNELED INTRAPERITONEAL CATHETER N/A 11/18/2020    Procedure: REMOVAL CATHETER PERITONEAL DIALYSIS;  Surgeon: Abdifatah Ty MD;  Location: [facility];  Service:  General    TONSILLECTOMY      UPPER GASTROINTESTINAL ENDOSCOPY       Length Of Stay: 2        PHYSICAL THERAPY EVALUATION:    Patient's identity confirmed via 2 patient identifiers (full name and ) at start of session       24 1104   PT Last Visit   PT Visit Date 24   Note Type   Note type Evaluation   Pain Assessment   Pain Assessment Tool 0-10   Pain Score No Pain   Restrictions/Precautions   Weight Bearing Precautions Per Order No   Other Precautions Cognitive;Chair Alarm;Bed Alarm;Multiple lines;Telemetry;Fall Risk   Home Living   Type of Home House   Home Layout Two level;1/2 bath on main level;Bed/bath upstairs;Stairs to enter with rails  (2 NIRU)   Bathroom Shower/Tub Tub/shower unit  (walk in shower available in mother's bathroom if needed)   Bathroom Toilet Standard   Bathroom Equipment Tub transfer bench   Bathroom Accessibility Accessible   Home Equipment Walker;Cane  (rollator walker)   Prior Function   Level of [place] Independent with functional mobility;Needs assistance with ADLs;Needs assistance with IADLS   Lives With Family  (parents)   Receives Help From Family;Outpatient therapy  (OPPT for balance post CVA in January)   IADLs Family/Friend/Other provides transportation;Independent with meal prep;Family/Friend/Other provides medication management  (pt able to make small meals, needs assistance w/ opening medication bottles)   Falls in the last 6 months 1 to 4  (1 fall on stairs in November)   Comments PTA pt reports he was ambulating ind w/ rollator walker, negotiating stairs independently w/ family home (states family will set him up w/ everything he needs if they leave the house)   General   Family/Caregiver Present Yes  (pt's mother Francine)   Cognition   Overall Cognitive Status Impaired  (generally WFL in conversation, questionable higher level deficits)   Arousal/Participation Cooperative   Attention Within functional limits   Orientation Level Oriented X4   Memory Decreased  "recall of precautions   Following Commands Follows multistep commands with increased time or repetition   Comments Pt ID via name and ; pt agreeable and very motivated for PT intervention. Pt very pleasant throughout   Subjective   Subjective \"as long as there is no cute nephew at the bottom I don't fall on the stairs\"   RLE Assessment   RLE Assessment X   Strength RLE   R Knee Flexion 3+/5   R Knee Extension 3+/5   R Ankle Dorsiflexion 4/5   R Ankle Plantar Flexion 4/5   LLE Assessment   LLE Assessment X   Strength LLE   L Knee Flexion 3+/5   L Knee Extension 3+/5   L Ankle Dorsiflexion 4/5   L Ankle Plantar Flexion 4/5   Vision-Basic Assessment   Current Vision Does not wear glasses   Light Touch   RLE Light Touch   (pt reports baseline decreased bilateral foot sensation)   LLE Light Touch   (pt reports baseline decreased bilateral foot sensation)   Bed Mobility   Additional Comments pt OOB in recliner chair upon arrival, returned to chair at end of session   Transfers   Sit to Stand 5  Supervision   Additional items Assist x 1;Armrests;Increased time required;Verbal cues   Stand to Sit 5  Supervision   Additional items Assist x 1;Armrests;Increased time required;Verbal cues   Ambulation/Elevation   Gait pattern Improper Weight shift;Decreased foot clearance;Short stride;Decreased heel strike   Gait Assistance 5  Supervision   Additional items Assist x 1;Verbal cues   Assistive Device 4-wheeled walker  (pt's rollator walker)   Distance 190'+30'   Stair Management Assistance 5  Supervision   Additional items Assist x 1;Verbal cues   Stair Management Technique One rail L;Alternating pattern   Number of Stairs 5   Balance   Static Sitting Good   Dynamic Sitting Fair +   Static Standing Fair +   Dynamic Standing Fair   Ambulatory Fair  (w/ rollator)   Endurance Deficit   Endurance Deficit No   Activity Tolerance   Activity Tolerance Patient tolerated treatment well   Medical Staff Made Aware Pt benefited from PT/OT " care coordination w/ OT Lexee due to to allow for challenge of pt's activity tolerance, PT and OT goals were addressed individually during session; ANKIT Dixon   Nurse Made Aware RN Rossy   Assessment   Prognosis Good   Problem List Decreased strength;Impaired balance;Decreased mobility;Decreased cognition   Assessment Mateus Mckeon is a 38 y.o. Male who presents to [facility] on 2/3/24 due to hypertension and diagnosis of hypertensive urgency. Orders for PT eval and treat received, w/ activity orders of ambulate patient. Comorbidities affecting pt's functional mobility at time of evaluation include: diabetic neuropathy, multiple CVAs, ELIAS, HTN, DM 1, ataxia. Personal factors affecting DC include: lives in 2 story house, ambulating w/ assistive device, stairs to enter home, and positive fall history. PTA pt was ambulating ind w/ rollator walker, and w/ 1 fall(s) in the previous 6 months. Upon evaluation, pt presents w/ the following deficits: impaired strength, impaired balance, decreased safety awareness, and gait deviations. Pt currently requires  supervision for transfers, supervision w/ rollator walker for ambulation, supervision w/ L UE support for stair negotiation. Pt's clinical presentation is unstable/unpredictable due to poor blood pressure control, need for increased assistance w/ functional mobility compared to baseline, need for input for mobility technique, recent readmission w/in 30 days, recent h/o falls, ongoing medical management. From a PT/mobility standpoint given the above findings, DC recommendation is level: III (Minimum Rehab Resource Intensity). During current admission, pt will benefit from continued skilled inpatient PT in the acute care setting in order to address the above deficits and to maximize function and mobility prior to DC from acute care.   Goals   Patient Goals to get OPOT   STG Expiration Date 02/16/24   Short Term Goal #1 Pt will: perform bed mobility w/ mod I to decrease pt's  burden of care and increase pt's independence w/ repositioning in bed; perform transfers w/ mod I to promote OOB mobility; ambulate at least 350' w/ LRAD and mod I to increase pt's ambulatory endurance/tolerance; negotiate at least 12 stair(s) w/ UE support and mod I to facilitate pt returning to previous living environment; increase all balance ratings by at least 1 grade to decrease pt's risk of falls   PT Treatment Day 0   Plan   Treatment/Interventions Functional transfer training;LE strengthening/ROM;Elevations;Therapeutic exercise;Endurance training;Patient/family training;Equipment eval/education;Bed mobility;Gait training;Compensatory technique education   PT Frequency 2-3x/wk   Discharge Recommendation   Rehab Resource Intensity Level, PT III (Minimum Resource Intensity)   AM-PAC Basic Mobility Inpatient   Turning in Flat Bed Without Bedrails 4   Lying on Back to Sitting on Edge of Flat Bed Without Bedrails 4   Moving Bed to Chair 3   Standing Up From Chair Using Arms 3   Walk in Room 3   Climb 3-5 Stairs With Railing 3   Basic Mobility Inpatient Raw Score 20   Basic Mobility Standardized Score 43.99   Highest Level Of Mobility   JH-HLM Goal 6: Walk 10 steps or more   JH-HLM Achieved 7: Walk 25 feet or more   End of Consult   Patient Position at End of Consult Bedside chair  (OT Lexee remaining present in room for additional OT intervention)       The patient's AM-PAC Basic Mobility Inpatient Short Form Raw Score is 20. A Raw score of greater than 16 suggests the patient may benefit from discharge to home. Please also refer to the recommendation of the Physical Therapist for safe discharge planning.    Pt will benefit from skilled inpatient PT during this admission in order to facilitate progress towards goals and to maximize functional independence prior to DC      DC rec: level III (Minimum Rehab Resource Intensity)        Suzan Su, PT, DPT  02/06/24

## 2024-02-06 NOTE — PROGRESS NOTES
[facility]  Progress Note  Name: Mateus Mckeon I  MRN: [id number]  Unit/Bed#: ICU 08 I Date of Admission: 2/3/2024   Date of Service: 2/6/2024 I Hospital Day: 2    Assessment/Plan   * Hypertensive urgency  Assessment & Plan  Temp:  [98 °F (36.7 °C)-98.6 °F (37 °C)] 98.5 °F (36.9 °C)  HR:  [68-86] 78  Resp:  [16-20] 18  BP: (131-206)/(60-96) 172/84    POA, patient presented to the emergency department complaining of headache and was found to be hypertensive to 238/122.  He was given labetalol IV 20 mg without any improvement in his blood pressure and was started on Cardene drip, transferred to ICU for further management.  Headache resolved with improved BP.  Patient has intermittently required cardine gtt due to ongoing HTN  Goal SBP < 180  Continue home medications hydralazine 100mg TID, labetalol 400mg TID, lisinopril 40mg qd, clonidine 0.3 mg/24hr TD weekly patch  Per nephrology recommendations, decreased nifedipine from 90mg BID to 60mg BID and added minoxidil 2.5mg BID.  -3.9L yesterday with dialysis, patient continued to be hypertensive overnight and will likely need additional dialysis today with dry weight challenge    ESRD on hemodialysis (HCC)  Assessment & Plan  Lab Results   Component Value Date    EGFR 8 02/06/2024    EGFR 5 02/05/2024    EGFR 6 02/04/2024    CREATININE 7.25 (H) 02/06/2024    CREATININE 10.88 (H) 02/05/2024    CREATININE 8.56 (H) 02/04/2024     Patient with known history of ESRD    Stable  Nephrology consulted to assist with hemodialysis MWF  Per nephrology recs, currently holding phoslo in the setting of normal-range phosphorus    Type 1 diabetes mellitus on insulin therapy (HCC)  Assessment & Plan  Lab Results   Component Value Date    HGBA1C 6.0 (H) 01/14/2024       Recent Labs     02/04/24  2110 02/05/24  1100 02/05/24  1759 02/06/24  0019   POCGLU 222* 131 190* 171*         Blood Sugar Average: Last 72 hrs:  (P) 162.4271568112987789  Patient has  insulin pump, can continue to use and assist him as needed if sugars become elevated    Cerebrovascular accident (CVA) of left pontine structure ([facility])  Assessment & Plan  PT/OT ordered, patient with residual RUE weakness following recent CVA  Needs walker for mobility  OOB with assistance  Fall precautions    Diabetic neuropathy ([facility])  Assessment & Plan  Lab Results   Component Value Date    HGBA1C 6.0 (H) 01/14/2024       Recent Labs     02/04/24  2110 02/05/24  1100 02/05/24  1759 02/06/24  0019   POCGLU 222* 131 190* 171*         Blood Sugar Average: Last 72 hrs:  (P) 162.9267519179925497  Continue PTA gabapentin 200qhs    Gastroesophageal reflux disease without esophagitis  Assessment & Plan  Continue PTA famotidine 40mg qd    Depression  Assessment & Plan  Continue PTA escitalopram 20mg qd    Generalized anxiety disorder  Assessment & Plan  Continue PTA escitalopram 20mg qd    Coronary artery disease involving native coronary artery of native heart without angina pectoris  Assessment & Plan  Continue PTA ASA 81mg qd    Hyperlipidemia  Assessment & Plan  Continue PTA atorvastatin 40mg qd    Essential (primary) hypertension  Assessment & Plan  Medication regimen as above for hypertensive urgency    Anemia due to chronic kidney disease, on chronic dialysis ([facility])  Assessment & Plan  Lab Results   Component Value Date    EGFR 8 02/06/2024    EGFR 5 02/05/2024    EGFR 6 02/04/2024    CREATININE 7.25 (H) 02/06/2024    CREATININE 10.88 (H) 02/05/2024    CREATININE 8.56 (H) 02/04/2024     Recent Labs     02/04/24  0936 02/05/24  0527 02/06/24  0529   HGB 7.9* 7.4* 7.9*   HCT 25.0* 23.6* 25.0*       Trend Hgb, replete if <7             Disposition: Critical care    ICU Core Measures     A: Assess, Prevent, and Manage Pain Has pain been assessed? Yes  Need for changes to pain regimen? No   B: Both SAT/SAT  N/A   C: Choice of Sedation RASS Goal: 0 Alert and Calm  Need for changes to sedation or analgesia regimen? No   D:  Delirium CAM-ICU: Negative   E: Early Mobility  Plan for early mobility? Yes   F: Family Engagement Plan for family engagement today? Yes         Prophylaxis:  VTE VTE covered by:  heparin (porcine), Subcutaneous, 5,000 Units at 02/06/24 0529       Stress Ulcer  covered byfamotidine (PEPCID) 40 MG tablet [[id number]] (Long-Term Med), famotidine (PEPCID) tablet 40 mg [[id number]]         HPI: 38-year-old male with history of ESRD on hemodialysis Monday Wednesday Friday, pulmonary hypertension, type 1 diabetes on insulin pump, diabetic nephropathy, GERD, depression, anxiety, coronary artery disease, hyperlipidemia, hypertension, anemia secondary to ESRD, recent CVA with subarachnoid hemorrhage with persistent right-sided deficit who initially presented to the ED complaining of headache and high blood pressure at home.  He was evaluated in the ED and found to have blood pressure of 228/123.  He reports he has been compliant with his medications, including taking evening meds around 1 hour prior to arrival.  He was given IV labetalol 20 mg without significant decrease in blood pressure and was therefore started on a Cardene drip, admitted to ICU for further management.    Significant 24hr Events     24hr events: Has required Cardene drip off and on over the past 24 hours due to persistent elevated blood pressure with SBP greater than 180.  Underwent hemodialysis yesterday with net negative of 3.9 L.  Continued to be hypotensive overnight, discussed with nephrology yesterday that patient will likely need repeat dialysis today with additional net negative anticipated.     Subjective   Review of Systems   Constitutional:  Negative for appetite change, chills and fever.   Respiratory:  Negative for cough and shortness of breath.    Cardiovascular:  Negative for chest pain and leg swelling.   Gastrointestinal:  Negative for abdominal pain, constipation, diarrhea, nausea and vomiting.   Psychiatric/Behavioral:  Negative for  sleep disturbance.       Objective                            Vitals I/O      Most Recent Min/Max in 24hrs   Temp 98.5 °F (36.9 °C) Temp  Min: 98 °F (36.7 °C)  Max: 98.6 °F (37 °C)   Pulse 78 Pulse  Min: 68  Max: 86   Resp 18 Resp  Min: 16  Max: 20   BP (!) 172/84 BP  Min: 131/61  Max: 206/91   O2 Sat 99 % SpO2  Min: 95 %  Max: 99 %      Intake/Output Summary (Last 24 hours) at 2/6/2024 0638  Last data filed at 2/6/2024 0501  Gross per 24 hour   Intake 912.49 ml   Output 3900 ml   Net -2987.51 ml       Diet Renal; Lo Phosphorus; No; No; Consistent Carbohydrate Diet Level 2 (5 carb servings/75 grams CHO/meal), Sodium 2 Gm    Invasive Monitoring           Physical Exam   Physical Exam  Vitals and nursing note reviewed.   Eyes:      Extraocular Movements: Extraocular movements intact.      Conjunctiva/sclera: Conjunctivae normal.      Pupils: Pupils are equal, round, and reactive to light.   Skin:     General: Skin is warm and dry.   HENT:      Head: Normocephalic and atraumatic.      Mouth/Throat:      Mouth: Mucous membranes are moist.   Cardiovascular:      Rate and Rhythm: Normal rate and regular rhythm.      Pulses: Normal pulses.      Heart sounds: Normal heart sounds.   Musculoskeletal:         General: No tenderness or signs of injury.   Abdominal: General: Bowel sounds are normal. There is no distension.      Palpations: Abdomen is soft.      Tenderness: There is no abdominal tenderness.   Constitutional:       General: He is awake. He is not in acute distress.     Appearance: Normal appearance. He is well-developed. He is not ill-appearing.      Interventions: He is not sedated, not intubated and not restrained.  Pulmonary:      Effort: Pulmonary effort is normal. No respiratory distress. He is not intubated.      Breath sounds: Normal breath sounds.   Psychiatric:         Behavior: Behavior is cooperative.   Neurological:      General: No focal deficit present.      Mental Status: He is alert and oriented to  person, place and time. Mental status is at baseline. He is calm.      Motor: gross motor function is at baseline for patient.            Diagnostic Studies      EKG: NSR 60s-70s on telemetry, EKG 2/3 NSR 74  Imaging: CT head without contrast 2/3: Resolution of previous identified intracranial hemorrhage.  No acute intracranial hemorrhage.  I have personally reviewed pertinent reports.       Medications:  Scheduled PRN   aspirin, 81 mg, Daily  atorvastatin, 40 mg, QPM  b complex-vitamin C-folic acid, 1 capsule, Daily With Dinner  chlorhexidine, 15 mL, Q12H HALIE  cinacalcet, 60 mg, Daily  cloNIDine, 1 patch, Weekly  escitalopram, 20 mg, Daily  famotidine, 40 mg, QAM  gabapentin, 200 mg, HS  heparin (porcine), 5,000 Units, Q8H HALIE  hydrALAZINE, 100 mg, TID  labetalol, 400 mg, TID  lisinopril, 40 mg, Daily  minoxidil, 2.5 mg, BID  NIFEdipine, 60 mg, BID  saccharomyces boulardii, 250 mg, Daily      insulin aspart, 100 Units, Daily PRN  traZODone, 25 mg, HS PRN       Continuous    niCARdipine, 1-15 mg/hr, Last Rate: 5 mg/hr (02/06/24 0416)         Labs:    CBC    Recent Labs     02/05/24  0527 02/06/24  0529   WBC 4.65 5.05   HGB 7.4* 7.9*   HCT 23.6* 25.0*    231     BMP    Recent Labs     02/05/24  0527 02/06/24  0529   SODIUM 141 143   K 4.2 4.3    102   CO2 30 32   AGAP 10 9   BUN 30* 22   CREATININE 10.88* 7.25*   CALCIUM 8.0* 8.2*       Coags    No recent results     Additional Electrolytes  Recent Labs     02/05/24  0527 02/06/24  0529   MG 2.2 2.1   PHOS 4.3 3.7          Blood Gas    No recent results  No recent results LFTs  Recent Labs     02/06/24  0529   ALT 8   AST 13   ALKPHOS 74   ALB 3.6   TBILI 0.53       Infectious  No recent results  Glucose  Recent Labs     02/05/24 0527 02/06/24  0529   GLUC 191* 146*                   Lashanda Wills DO

## 2024-02-06 NOTE — PLAN OF CARE
Problem: PAIN - ADULT  Goal: Verbalizes/displays adequate comfort level or baseline comfort level  Description: Interventions:  - Encourage patient to monitor pain and request assistance  - Assess pain using appropriate pain scale  - Administer analgesics based on type and severity of pain and evaluate response  - Implement non-pharmacological measures as appropriate and evaluate response  - Consider cultural and social influences on pain and pain management  - Notify physician/advanced practitioner if interventions unsuccessful or patient reports new pain  Outcome: Progressing     Problem: INFECTION - ADULT  Goal: Absence or prevention of progression during hospitalization  Description: INTERVENTIONS:  - Assess and monitor for signs and symptoms of infection  - Monitor lab/diagnostic results  - Monitor all insertion sites, i.e. indwelling lines, tubes, and drains  - Monitor endotracheal if appropriate and nasal secretions for changes in amount and color  - [place] appropriate cooling/warming therapies per order  - Administer medications as ordered  - Instruct and encourage patient and family to use good hand hygiene technique  - Identify and instruct in appropriate isolation precautions for identified infection/condition  Outcome: Progressing  Goal: Absence of fever/infection during neutropenic period  Description: INTERVENTIONS:  - Monitor WBC    Outcome: Progressing     Problem: SAFETY ADULT  Goal: Patient will remain free of falls  Description: INTERVENTIONS:  - Educate patient/family on patient safety including physical limitations  - Instruct patient to call for assistance with activity   - Consult OT/PT to assist with strengthening/mobility   - Keep Call bell within reach  - Keep bed low and locked with side rails adjusted as appropriate  - Keep care items and personal belongings within reach  - Initiate and maintain comfort rounds  - Make Fall Risk Sign visible to staff  - Apply yellow socks and bracelet  for high fall risk patients  - Consider moving patient to room near nurses station  Outcome: Progressing  Goal: Maintain or return to baseline ADL function  Description: INTERVENTIONS:  -  Assess patient's ability to carry out ADLs; assess patient's baseline for ADL function and identify physical deficits which impact ability to perform ADLs (bathing, care of mouth/teeth, toileting, grooming, dressing, etc.)  - Assess/evaluate cause of self-care deficits   - Assess range of motion  - Assess patient's mobility; develop plan if impaired  - Assess patient's need for assistive devices and provide as appropriate  - Encourage maximum independence but intervene and supervise when necessary  - Involve family in performance of ADLs  - Assess for home care needs following discharge   - Consider OT consult to assist with ADL evaluation and planning for discharge  - Provide patient education as appropriate  Outcome: Progressing  Goal: Maintains/Returns to pre admission functional level  Description: INTERVENTIONS:  - Perform AM-PAC 6 Click Basic Mobility/ Daily Activity assessment daily.  - Set and communicate daily mobility goal to care team and patient/family/caregiver.   - Collaborate with rehabilitation services on mobility goals if consulted  - Out of bed for toileting  - Record patient progress and toleration of activity level   Outcome: Progressing     Problem: DISCHARGE PLANNING  Goal: Discharge to home or other facility with appropriate resources  Description: INTERVENTIONS:  - Identify barriers to discharge w/patient and caregiver  - Arrange for needed discharge resources and transportation as appropriate  - Identify discharge learning needs (meds, wound care, etc.)  - Arrange for interpretive services to assist at discharge as needed  - Refer to Case Management Department for coordinating discharge planning if the patient needs post-hospital services based on physician/advanced practitioner order or complex needs  related to functional status, cognitive ability, or social support system  Outcome: Progressing

## 2024-02-06 NOTE — PLAN OF CARE
Problem: OCCUPATIONAL THERAPY ADULT  Goal: Performs self-care activities at highest level of function for planned discharge setting.  See evaluation for individualized goals.  Description: Treatment Interventions: ADL retraining, Functional transfer training, Patient/family training, Compensatory technique education, Continued evaluation, UE strengthening/ROM  Equipment Recommended:  (pt provided w/ stress ball, built up utensils, stack of cups for exercise)       See flowsheet documentation for full assessment, interventions and recommendations.   Note: Limitation: Decreased ADL status, Decreased Safe judgement during ADL, Decreased endurance, Decreased self-care trans, Decreased high-level ADLs, Decreased fine motor control, Non-func R UE, Decreased sensation (balance, FM coord, GM coord, sensation, dexterity, and strength, safety awareness and problem solving, response time)  Prognosis: Good  Assessment: Pt is a 38 y.o. male seen for OT evaluation s/p admit to [facility] on 2/3/2024 w/Hypertensive urgency. Prior to admission, pt was living with parents in a 2 level house, mostly (I) with ADLs, (A) with IADLs, (+) falls, (-) . Personal and environmental factors affecting patient at time of evaluation include difficulty completing ADLs and difficulty completing IADLs. Personal factors supporting patient at time of evaluation include age, supportive parents, attitude towards recovery, and accessible home environment. Based upon this evaluation, pt is functioning below baseline. Pt will benefit from continued skilled inpatient OT to maximize safety, level of independence overall performance in ADLs, functional transfers, functional mobility to return to functional baseline/highest level of function.     Rehab Resource Intensity Level, OT: III (Minimum Resource Intensity) (OPOT for hand therapy)     SEVN Griffith, OTR/L  [place] License [id number]  [place] License [id number]

## 2024-02-06 NOTE — ASSESSMENT & PLAN NOTE
Lab Results   Component Value Date    EGFR 8 02/06/2024    EGFR 5 02/05/2024    EGFR 6 02/04/2024    CREATININE 7.25 (H) 02/06/2024    CREATININE 10.88 (H) 02/05/2024    CREATININE 8.56 (H) 02/04/2024     Recent Labs     02/04/24  0936 02/05/24  0527 02/06/24  0529   HGB 7.9* 7.4* 7.9*   HCT 25.0* 23.6* 25.0*       Trend Hgb, replete if <7

## 2024-02-06 NOTE — ASSESSMENT & PLAN NOTE
Lab Results   Component Value Date    HGBA1C 6.0 (H) 01/14/2024       Recent Labs     02/04/24  2110 02/05/24  1100 02/05/24  1759 02/06/24  0019   POCGLU 222* 131 190* 171*         Blood Sugar Average: Last 72 hrs:  (P) 162.8989538028109201  Continue PTA gabapentin 200qhs

## 2024-02-06 NOTE — PLAN OF CARE
Problem: PAIN - ADULT  Goal: Verbalizes/displays adequate comfort level or baseline comfort level  Description: Interventions:  - Encourage patient to monitor pain and request assistance  - Assess pain using appropriate pain scale  - Administer analgesics based on type and severity of pain and evaluate response  - Implement non-pharmacological measures as appropriate and evaluate response  - Consider cultural and social influences on pain and pain management  - Notify physician/advanced practitioner if interventions unsuccessful or patient reports new pain  Outcome: Progressing     Problem: INFECTION - ADULT  Goal: Absence or prevention of progression during hospitalization  Description: INTERVENTIONS:  - Assess and monitor for signs and symptoms of infection  - Monitor lab/diagnostic results  - Monitor all insertion sites, i.e. indwelling lines, tubes, and drains  - Monitor endotracheal if appropriate and nasal secretions for changes in amount and color  - [place] appropriate cooling/warming therapies per order  - Administer medications as ordered  - Instruct and encourage patient and family to use good hand hygiene technique  - Identify and instruct in appropriate isolation precautions for identified infection/condition  Outcome: Progressing  Goal: Absence of fever/infection during neutropenic period  Description: INTERVENTIONS:  - Monitor WBC    Outcome: Progressing     Problem: SAFETY ADULT  Goal: Patient will remain free of falls  Description: INTERVENTIONS:  - Educate patient/family on patient safety including physical limitations  - Instruct patient to call for assistance with activity   - Consult OT/PT to assist with strengthening/mobility   - Keep Call bell within reach  - Keep bed low and locked with side rails adjusted as appropriate  - Keep care items and personal belongings within reach  - Initiate and maintain comfort rounds  - Make Fall Risk Sign visible to staff  - Obtain necessary fall risk  management equipment:  - Apply yellow socks and bracelet for high fall risk patients  - Consider moving patient to room near nurses station  Outcome: Progressing  Goal: Maintain or return to baseline ADL function  Description: INTERVENTIONS:  -  Assess patient's ability to carry out ADLs; assess patient's baseline for ADL function and identify physical deficits which impact ability to perform ADLs (bathing, care of mouth/teeth, toileting, grooming, dressing, etc.)  - Assess/evaluate cause of self-care deficits   - Assess range of motion  - Assess patient's mobility; develop plan if impaired  - Assess patient's need for assistive devices and provide as appropriate  - Encourage maximum independence but intervene and supervise when necessary  - Involve family in performance of ADLs  - Assess for home care needs following discharge   - Consider OT consult to assist with ADL evaluation and planning for discharge  - Provide patient education as appropriate  Outcome: Progressing  Goal: Maintains/Returns to pre admission functional level  Description: INTERVENTIONS:  - Perform AM-PAC 6 Click Basic Mobility/ Daily Activity assessment daily.  - Set and communicate daily mobility goal to care team and patient/family/caregiver.   - Collaborate with rehabilitation services on mobility goals if consulted  - Stand patient   - Ambulate patient   - Out of bed to chair   - Out of bed for meals   - Out of bed for toileting  - Record patient progress and toleration of activity level   Outcome: Progressing     Problem: DISCHARGE PLANNING  Goal: Discharge to home or other facility with appropriate resources  Description: INTERVENTIONS:  - Identify barriers to discharge w/patient and caregiver  - Arrange for needed discharge resources and transportation as appropriate  - Identify discharge learning needs (meds, wound care, etc.)  - Arrange for interpretive services to assist at discharge as needed  - Refer to Case Management Department  for coordinating discharge planning if the patient needs post-hospital services based on physician/advanced practitioner order or complex needs related to functional status, cognitive ability, or social support system  Outcome: Progressing     Problem: Knowledge Deficit  Goal: Patient/family/caregiver demonstrates understanding of disease process, treatment plan, medications, and discharge instructions  Description: Complete learning assessment and assess knowledge base.  Interventions:  - Provide teaching at level of understanding  - Provide teaching via preferred learning methods  Outcome: Progressing     Problem: METABOLIC, FLUID AND ELECTROLYTES - ADULT  Goal: Electrolytes maintained within normal limits  Description: INTERVENTIONS:  - Monitor labs and assess patient for signs and symptoms of electrolyte imbalances  - Administer electrolyte replacement as ordered  - Monitor response to electrolyte replacements, including repeat lab results as appropriate  - Instruct patient on fluid and nutrition as appropriate  Outcome: Progressing  Goal: Fluid balance maintained  Description: INTERVENTIONS:  - Monitor labs   - Monitor I/O and WT  - Instruct patient on fluid and nutrition as appropriate  - Assess for signs & symptoms of volume excess or deficit  Outcome: Progressing

## 2024-02-06 NOTE — ASSESSMENT & PLAN NOTE
Lab Results   Component Value Date    HGBA1C 6.0 (H) 01/14/2024       Recent Labs     02/04/24  2110 02/05/24  1100 02/05/24  1759 02/06/24  0019   POCGLU 222* 131 190* 171*         Blood Sugar Average: Last 72 hrs:  (P) 162.3695717196206525  Patient has insulin pump, can continue to use and assist him as needed if sugars become elevated

## 2024-02-06 NOTE — OCCUPATIONAL THERAPY NOTE
Occupational Therapy Evaluation     Patient Name: Mateus Mckeon  Today's Date: 2/6/2024  Problem List  Principal Problem:    Hypertensive urgency  Active Problems:    Diabetic neuropathy (HCC)    ESRD on hemodialysis (HCC)    Gastroesophageal reflux disease without esophagitis    Depression    Generalized anxiety disorder    Coronary artery disease involving native coronary artery of native heart without angina pectoris    Type 1 diabetes mellitus on insulin therapy (HCC)    Hyperlipidemia    Essential (primary) hypertension    Anemia due to chronic kidney disease, on chronic dialysis (HCC)    Cerebrovascular accident (CVA) of left pontine structure (HCC)    Past Medical History  Past Medical History:   Diagnosis Date    Acute kidney injury (HCC)     Ambulates with cane     Anuria     Anxiety     Cellulitis of right elbow 03/31/2021    Chronic kidney disease     Depression     Diabetes mellitus (HCC)     Diarrhea     Emesis 10/24/2020    End stage renal disease (HCC) 02/11/2018    Formatting of this note might be different from the original. Last Assessment & Plan:  Secondary to DM.  On nightly PD.  Followed by Nephro.  Patient considering transplant for kidney and pancreas through [facility] Formatting of this note might be different from the original. Last Assessment & Plan:  Formatting of this note might be different from the original. 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    Esophagitis 07/21/2015    Falls     Gastroparesis     GERD (gastroesophageal reflux disease)     History of shingles 2010    History of transfusion 02/2018    no adverse reaction    Hyperlipidemia     Hyperphosphatemia     Hypertension     Hypoglycemia 07/15/2022    Itching     Mastoiditis of right side 07/15/2022    Muscle weakness     general unsteadiness    Obesity (BMI 30.0-34.9) 09/09/2019    Orthostatic hypotension 10/25/2020    Peripheral polyneuropathy 11/20/2019    PONV (postoperative  nausea and vomiting) 01/26/2018    Protein-calorie malnutrition (HCC) 11/23/2020    Recurrent peritonitis (HCC) due to peritoneal dialysis catheter 07/31/2020    Retinopathy     Seizures (HCC)     early 2020 - one time    Skin abnormality     some dime size areas where skin was scratched from itching    Spontaneous bacterial peritonitis (HCC) 10/19/2020    Squamous cell skin cancer     left temple    Stroke (HCC)     x2 - off balance/no driving/fatigue    Swelling of both lower extremities     Traumatic onycholysis 07/21/2022    Vomiting     Wears glasses      Past Surgical History  Past Surgical History:   Procedure Laterality Date    CARDIAC ELECTROPHYSIOLOGY PROCEDURE N/A 9/21/2023    Procedure: Cardiac loop recorder explant;  Surgeon: Parish Morgan MD;  [facility]: [facility];  Service: Cardiology    CARDIAC LOOP RECORDER  05/2018    COLONOSCOPY      EGD      EYE SURGERY Right     IR AV FISTULAGRAM/GRAFTOGRAM  02/23/2021    IR CEREBRAL ANGIOGRAPHY  1/12/2024    IR CEREBRAL ANGIOGRAPHY / INTERVENTION  1/5/2024    IR TUNNELED CENTRAL LINE PLACEMENT  02/16/2021    IR TUNNELED DIALYSIS CATHETER PLACEMENT  11/18/2020    IR TUNNELED DIALYSIS CATHETER REMOVAL  02/12/2021    IR TUNNELED DIALYSIS CATHETER REMOVAL  03/11/2021    MOHS SURGERY Left 12/14/2022    Left temple with Dr. Hassan    PERITONEAL CATHETER INSERTION N/A 08/27/2018    Procedure: UNROOF PD CATHETER;  Surgeon: Felipe Lindo DO;  Location: [facility];  Service: General    PA ARTERIOVENOUS ANASTOMOSIS OPEN DIRECT Left 11/09/2020    Procedure: CREATION FISTULA  ARTERIOVENOUS (AV) - LEFT WRIST;  Surgeon: Placido Altamirano MD;  Location: [facility];  Service: Vascular    PA ESOPHAGOGASTRODUODENOSCOPY TRANSORAL DIAGNOSTIC N/A 04/18/2019    Procedure: ESOPHAGOGASTRODUODENOSCOPY (EGD);  Surgeon: Ale Figueroa MD;  Location: [facility];  Service: Gastroenterology    PA LAPS INSERTION TUNNELED INTRAPERITONEAL CATHETER N/A 08/06/2018    Procedure:  LAPAROSCOPIC PD CATHETER PLACEMENT;  Surgeon: Felipe Lindo DO;  Location: [facility];  Service: General    ID REMOVAL TUNNELED INTRAPERITONEAL CATHETER N/A 11/18/2020    Procedure: REMOVAL CATHETER PERITONEAL DIALYSIS;  Surgeon: Abdifatah Ty MD;  [facility]: [facility];  Service: General    TONSILLECTOMY      UPPER GASTROINTESTINAL ENDOSCOPY          02/06/24 1039   OT Last Visit   OT Visit Date 02/06/24   Note Type   Note type Evaluation  (and Treatment)   Pain Assessment   Pain Assessment Tool 0-10   Pain Score No Pain   Restrictions/Precautions   Weight Bearing Precautions Per Order No   Other Precautions Cognitive;Chair Alarm;Bed Alarm;Multiple lines;Fall Risk  (RUE hemiparesis)   Home Living   Type of Home House   Home Layout Two level;1/2 bath on main level;Bed/bath upstairs;Stairs to enter with rails  (2 NIRU)   Bathroom Shower/Tub Tub/shower unit  (has walk in shower available in mothers room but does not use)   Bathroom Toilet Standard   Bathroom Equipment Tub transfer bench   Bathroom Accessibility Accessible   Home Equipment Cane  (rollator)   Prior Function   Level of [place] Needs assistance with ADLs;Independent with functional mobility;Needs assistance with IADLS  (A to tie shoes, intermittent ADL assistance)   Lives With Family  (parents)   Receives Help From Family;Outpatient therapy  (was receiving OPPT on [address])   IADLs Family/Friend/Other provides transportation;Independent with meal prep;Family/Friend/Other provides medication management  (able to prepare simple meals; A for med organization due to R hand deficits)   Falls in the last 6 months 1 to 4  (1 fall on stairs)   Vocational On disability   Comments Pt reports he is fairly (I) w/ ADLs PTA, Mom ties shoes and assists PRN. Uses rollator for mobility on first floor of house, no AD upstairs. Parents complete IADLs. Has been attending OPPT for balance. Has not yet been able to work w/ OPOT for hand therapy.   Lifestyle   Autonomy Pt  "lives w/ parents in a 2 level house and is mostly (I) w/ ADLs PTA, Rollator, (+) falls, (-)    Reciprocal Relationships Supportive parents who are pt primary caregivers   Service to Others On disability   Intrinsic Gratification Pt enjoys going to therapy, spending time with his nephews   General   Additional Pertinent History Pt admitted w/ hypertensive urgency. PMH includes: ESRD on HD, CAD, T1DM, HLD, HTN, anemia, hx of multiple CVA including L pontine w/ residual R sided deficits, lucunar infarct and cerebellar infarct.   Family/Caregiver Present Yes  (Mother)   Subjective   Subjective \"I was a theater kid, I don't have modesty\"   ADL   Where Assessed Chair   Eating Assistance 6  Modified independent   Eating Deficit Setup;Beverage management   Grooming Assistance 5  Supervision/Setup   UB Bathing Assistance 5  Supervision/Setup   LB Bathing Assistance 4  Minimal Assistance   UB Dressing Assistance 5  Supervision/Setup   LB Dressing Assistance 4  Minimal Assistance   LB Dressing Deficit Don/doff R shoe;Don/doff L shoe;Setup  (sitting in recliner, unable to don R shoe (did not fit))   Toileting Assistance  4  Minimal Assistance   Bed Mobility   Additional Comments Pt received in recliner upon arrival, returned at end of session   Transfers   Sit to Stand 5  Supervision   Additional items Assist x 1;Increased time required;Verbal cues   Stand to Sit 5  Supervision   Additional items Assist x 1;Increased time required;Verbal cues   Functional Mobility   Functional Mobility 5  Supervision   Additional Comments Household distance w/ rollator and S   Additional items   (Rollator)   Balance   Static Sitting Good   Dynamic Sitting Fair +   Static Standing Fair   Dynamic Standing Fair -   Activity Tolerance   Activity Tolerance Patient tolerated treatment well   Medical Staff Made Aware Care coordinated w/ PT ANKIT Mares   Nurse Made Aware yes, RN Rossy ok to see pt   RUE Assessment   RUE Assessment " X  (residual deficits from previous CVA on 1/16)   RUE Overall AROM   R Mass Grasp able to make fist (thumb and index finger swollen/limited flexion)   R Finger Flexion thumb w/ little active flexion/extension, able to oppose all fingers to thumb (index required significantly (+) time), bradykinetic throughout   R Finger Extension unable to fully extend fingers   RUE Strength   RUE Overall Strength Deficits   R Shoulder Flexion 4/5  (able to achieve at least 90 degrees flexion)   R Elbow Flexion 4+/5   R Elbow Extension 4/5   R Wrist Flexion 4+/5   R Wrist Extension 4/5   LUE Assessment   LUE Assessment WFL   Hand Function   Gross Motor Coordination   (impaired RUE; WFL LUE; hand to mouth intact w/ (+) time, bradykinetic, incoordinated; finger to nose impaired)   Fine Motor Coordination   (Impaired RUE; WFL LUE; unable to open toothpaste container w/ b/l hands)   Hand Function Comments R handed baseline   Sensation   Light Touch Partial deficits in the RUE   Vision-Basic Assessment   Current Vision Does not wear glasses   Cognition   Overall Cognitive Status Impaired  (generally WFL in conversation, questionable higher level deficits noted)   Arousal/Participation Alert;Cooperative  (enthusiastic)   Attention Within functional limits   Orientation Level Oriented X4   Memory Decreased recall of precautions   Following Commands Follows multistep commands with increased time or repetition   Comments Extremely pleasant, motivated. Excellent attention, good command follow and problem solving. Questionable insight into deficits.   Assessment   Limitation Decreased ADL status;Decreased Safe judgement during ADL;Decreased endurance;Decreased self-care trans;Decreased high-level ADLs;Decreased fine motor control;Non-func R UE;Decreased sensation  (balance, FM coord, GM coord, sensation, dexterity, and strength, safety awareness and problem solving, response time)   Prognosis Good   Assessment Pt is a 38 y.o. male seen for OT  evaluation s/p admit to [facility] on 2/3/2024 w/Hypertensive urgency. Prior to admission, pt was living with parents in a 2 level house, mostly (I) with ADLs, (A) with IADLs, (+) falls, (-) . Personal and environmental factors affecting patient at time of evaluation include difficulty completing ADLs and difficulty completing IADLs. Personal factors supporting patient at time of evaluation include age, supportive parents, attitude towards recovery, and accessible home environment. Based upon this evaluation, pt is functioning below baseline. Pt will benefit from continued skilled inpatient OT to maximize safety, level of independence overall performance in ADLs, functional transfers, functional mobility to return to functional baseline/highest level of function.   Goals   Patient Goals to get OT   LTG Time Frame 10-14   Long Term Goal #1 see goals below   Plan   Treatment Interventions ADL retraining;Functional transfer training;Patient/family training;Compensatory technique education;Continued evaluation;UE strengthening/ROM   Goal Expiration Date 02/16/24   OT Treatment Day 0   OT Frequency 3-5x/wk   Discharge Recommendation   Rehab Resource Intensity Level, OT III (Minimum Resource Intensity)  (OPOT for hand therapy)   Equipment Recommended   (pt provided w/ stress ball, built up utensils, stack of cups for exercise)   Additional Comments  The patient's raw score on the -PAC Daily Activity Inpatient Short Form is 19. A raw score of greater than or equal to 19 suggests the patient may benefit from discharge to home w/ OPOT for hand therapy. Please refer to the recommendation of the Occupational Therapist for safe discharge planning.   AM-PAC Daily Activity Inpatient   Lower Body Dressing 3   Bathing 3   Toileting 3   Upper Body Dressing 3   Grooming 3   Eating 4   Daily Activity Raw Score 19   Daily Activity Standardized Score (Calc for Raw Score >=11) 40.22   AM-PAC Applied Cognition Inpatient  "  Following a Speech/Presentation 3   Understanding Ordinary Conversation 4   Taking Medications 3   Remembering Where Things Are Placed or Put Away 3   Remembering List of 4-5 Errands 2   Taking Care of Complicated Tasks 2   Applied Cognition Raw Score 17   Applied Cognition Standardized Score 36.52   Additional Treatment Session   Start Time 1104   End Time 1128   Treatment Assessment Pt seen for additional treatment session to provide pt w/ exercise program and compensatory/adaptive techniques. Pt states \"This has been so helpful\". Pt provided w/ visual demonstration for RUE hand stretching as well as AROM to perform. Pt also provided w/ stress ball and educated on mass grasp and isolated finger flexion. Pt verbalized and demonstrated understanding of exercise program. Pt also provided w/ built up utensils for feeding. Pt then engaged in cup stacking activity w/ RUE and was able to stack 6 cups in a pyramid w/ only 1 drop to work on grasp/release. Notable incoordination/bradykinesia during activity. Pt will benefit from continued engagement in skilled IPOT this admission to maximize safety/independence w/ ADLs/IADLs and RUE function.   Additional Treatment Day 1   End of Consult   Patient Position at End of Consult Bedside chair;All needs within reach   Nurse Communication Nurse aware of consult     GOALS:    *Goals established to promote patient goal of to get OT:      *Patient will perform grooming tasks standing at sink with mod (I) and effective integration of RUE in order to increase overall independence w/ self-care     *UB ADL with mod (I) for inc'd independence with self care    *LB ADL with mod (I) using AE prn for inc'd independence with self care and decrease caregiver burden    *Toileting with (I) for clothing management and hygiene to increase hygiene/thoroughness in order to reduce caregiver burden    *ADL transfers with mod (I) for inc'd independence with ADLs/purposeful tasks    *Increase stand " tolerance x 10  m for inc'd tolerance with standing purposeful tasks including light meal prep/household management    *Participate in 10m UE therex program to increase overall strength/coordination in RUE for improved performance in ALDs/IADLs    SVEN Griffith, OTR/L    [place] License [id number]  [place] License [id number]

## 2024-02-06 NOTE — ASSESSMENT & PLAN NOTE
Temp:  [98 °F (36.7 °C)-98.6 °F (37 °C)] 98.5 °F (36.9 °C)  HR:  [68-86] 78  Resp:  [16-20] 18  BP: (131-206)/(60-96) 172/84    POA, patient presented to the emergency department complaining of headache and was found to be hypertensive to 238/122.  He was given labetalol IV 20 mg without any improvement in his blood pressure and was started on Cardene drip, transferred to ICU for further management.  Headache resolved with improved BP.  Patient has intermittently required cardine gtt due to ongoing HTN  Goal SBP < 180  Continue home medications hydralazine 100mg TID, labetalol 400mg TID, lisinopril 40mg qd, clonidine 0.3 mg/24hr TD weekly patch  Per nephrology recommendations, decreased nifedipine from 90mg BID to 60mg BID and added minoxidil 2.5mg BID.  -3.9L yesterday with dialysis, patient continued to be hypertensive overnight and will likely need additional dialysis today with dry weight challenge

## 2024-02-06 NOTE — PLAN OF CARE
Post-Dialysis RN Treatment Note    Blood Pressure:  Pre 135/76 mm/Hg  Post 130/72 mmHg   EDW  93.0 kg    Weight:  Pre 97.4 kg   Post 94.4 kg   Mode of weight measurement: Standing Scale   Volume Removed  3000 ml    Treatment duration 120 minutes    NS given  No    Treatment shortened? No   Medications given during Rx Not Applicable   Estimated Kt/V  Not Applicable   Access type: AV fistula   Access Issues: No    Report called to primary nurse   Yes /Rossy Cruz RN        3000 ml UF only, 2 hrs  Problem: METABOLIC, FLUID AND ELECTROLYTES - ADULT  Goal: Electrolytes maintained within normal limits  Description: INTERVENTIONS:  - Monitor labs and assess patient for signs and symptoms of electrolyte imbalances  - Administer electrolyte replacement as ordered  - Monitor response to electrolyte replacements, including repeat lab results as appropriate  - Instruct patient on fluid and nutrition as appropriate  Outcome: Progressing  Goal: Fluid balance maintained  Description: INTERVENTIONS:  - Monitor labs   - Monitor I/O and WT  - Instruct patient on fluid and nutrition as appropriate  - Assess for signs & symptoms of volume excess or deficit  Outcome: Progressing

## 2024-02-07 ENCOUNTER — APPOINTMENT (OUTPATIENT)
Dept: PHYSICAL THERAPY | Facility: CLINIC | Age: 39
End: 2024-02-07
Payer: MEDICARE

## 2024-02-07 ENCOUNTER — APPOINTMENT (INPATIENT)
Dept: DIALYSIS | Facility: HOSPITAL | Age: 39
DRG: 304 | End: 2024-02-07
Payer: MEDICARE

## 2024-02-07 LAB
ANION GAP SERPL CALCULATED.3IONS-SCNC: 10 MMOL/L
BUN SERPL-MCNC: 34 MG/DL (ref 5–25)
CALCIUM SERPL-MCNC: 8.2 MG/DL (ref 8.4–10.2)
CHLORIDE SERPL-SCNC: 101 MMOL/L (ref 96–108)
CO2 SERPL-SCNC: 29 MMOL/L (ref 21–32)
CREAT SERPL-MCNC: 9 MG/DL (ref 0.6–1.3)
ERYTHROCYTE [DISTWIDTH] IN BLOOD BY AUTOMATED COUNT: 18.3 % (ref 11.6–15.1)
FERRITIN SERPL-MCNC: 495 NG/ML (ref 24–336)
GFR SERPL CREATININE-BSD FRML MDRD: 6 ML/MIN/1.73SQ M
GLUCOSE SERPL-MCNC: 106 MG/DL (ref 65–140)
HCT VFR BLD AUTO: 23.8 % (ref 36.5–49.3)
HGB BLD-MCNC: 7.5 G/DL (ref 12–17)
IRON SATN MFR SERPL: 55 % (ref 15–50)
IRON SERPL-MCNC: 89 UG/DL (ref 50–212)
MAGNESIUM SERPL-MCNC: 2.1 MG/DL (ref 1.9–2.7)
MCH RBC QN AUTO: 31.8 PG (ref 26.8–34.3)
MCHC RBC AUTO-ENTMCNC: 31.5 G/DL (ref 31.4–37.4)
MCV RBC AUTO: 101 FL (ref 82–98)
PHOSPHATE SERPL-MCNC: 5.1 MG/DL (ref 2.7–4.5)
PLATELET # BLD AUTO: 201 THOUSANDS/UL (ref 149–390)
PMV BLD AUTO: 9.4 FL (ref 8.9–12.7)
POTASSIUM SERPL-SCNC: 4.2 MMOL/L (ref 3.5–5.3)
RBC # BLD AUTO: 2.36 MILLION/UL (ref 3.88–5.62)
SODIUM SERPL-SCNC: 140 MMOL/L (ref 135–147)
TIBC SERPL-MCNC: 162 UG/DL (ref 250–450)
UIBC SERPL-MCNC: 73 UG/DL (ref 155–355)
WBC # BLD AUTO: 4.42 THOUSAND/UL (ref 4.31–10.16)

## 2024-02-07 PROCEDURE — 99232 SBSQ HOSP IP/OBS MODERATE 35: CPT | Performed by: INTERNAL MEDICINE

## 2024-02-07 PROCEDURE — 99232 SBSQ HOSP IP/OBS MODERATE 35: CPT | Performed by: STUDENT IN AN ORGANIZED HEALTH CARE EDUCATION/TRAINING PROGRAM

## 2024-02-07 PROCEDURE — 83735 ASSAY OF MAGNESIUM: CPT

## 2024-02-07 PROCEDURE — 83550 IRON BINDING TEST: CPT

## 2024-02-07 PROCEDURE — 82728 ASSAY OF FERRITIN: CPT

## 2024-02-07 PROCEDURE — 80048 BASIC METABOLIC PNL TOTAL CA: CPT

## 2024-02-07 PROCEDURE — 84100 ASSAY OF PHOSPHORUS: CPT

## 2024-02-07 PROCEDURE — 83540 ASSAY OF IRON: CPT

## 2024-02-07 PROCEDURE — 85027 COMPLETE CBC AUTOMATED: CPT

## 2024-02-07 RX ORDER — MINOXIDIL 2.5 MG/1
5 TABLET ORAL 2 TIMES DAILY
Status: DISCONTINUED | OUTPATIENT
Start: 2024-02-07 | End: 2024-02-08 | Stop reason: HOSPADM

## 2024-02-07 RX ORDER — CALCIUM ACETATE 667 MG/1
667 CAPSULE ORAL 3 TIMES DAILY
Status: DISCONTINUED | OUTPATIENT
Start: 2024-02-07 | End: 2024-02-08 | Stop reason: HOSPADM

## 2024-02-07 RX ORDER — ONDANSETRON 2 MG/ML
4 INJECTION INTRAMUSCULAR; INTRAVENOUS EVERY 6 HOURS PRN
Status: DISCONTINUED | OUTPATIENT
Start: 2024-02-07 | End: 2024-02-08 | Stop reason: HOSPADM

## 2024-02-07 RX ADMIN — GABAPENTIN 200 MG: 100 CAPSULE ORAL at 22:13

## 2024-02-07 RX ADMIN — MINOXIDIL 5 MG: 2.5 TABLET ORAL at 08:29

## 2024-02-07 RX ADMIN — LISINOPRIL 40 MG: 20 TABLET ORAL at 08:29

## 2024-02-07 RX ADMIN — MINOXIDIL 5 MG: 2.5 TABLET ORAL at 17:52

## 2024-02-07 RX ADMIN — ATORVASTATIN CALCIUM 40 MG: 40 TABLET, FILM COATED ORAL at 17:53

## 2024-02-07 RX ADMIN — NIFEDIPINE 60 MG: 30 TABLET, EXTENDED RELEASE ORAL at 22:14

## 2024-02-07 RX ADMIN — ASPIRIN 81 MG: 81 TABLET, COATED ORAL at 08:30

## 2024-02-07 RX ADMIN — HEPARIN SODIUM 5000 UNITS: 5000 INJECTION INTRAVENOUS; SUBCUTANEOUS at 05:49

## 2024-02-07 RX ADMIN — LABETALOL HYDROCHLORIDE 400 MG: 200 TABLET, FILM COATED ORAL at 22:16

## 2024-02-07 RX ADMIN — NIFEDIPINE 60 MG: 30 TABLET, EXTENDED RELEASE ORAL at 08:30

## 2024-02-07 RX ADMIN — LABETALOL HYDROCHLORIDE 400 MG: 200 TABLET, FILM COATED ORAL at 15:39

## 2024-02-07 RX ADMIN — TRAZODONE HYDROCHLORIDE 25 MG: 50 TABLET ORAL at 22:13

## 2024-02-07 RX ADMIN — LABETALOL HYDROCHLORIDE 400 MG: 200 TABLET, FILM COATED ORAL at 08:29

## 2024-02-07 RX ADMIN — HYDRALAZINE HYDROCHLORIDE 100 MG: 25 TABLET ORAL at 08:29

## 2024-02-07 RX ADMIN — HYDRALAZINE HYDROCHLORIDE 100 MG: 25 TABLET ORAL at 22:15

## 2024-02-07 RX ADMIN — HYDRALAZINE HYDROCHLORIDE 100 MG: 25 TABLET ORAL at 16:23

## 2024-02-07 RX ADMIN — ONDANSETRON 4 MG: 2 INJECTION INTRAMUSCULAR; INTRAVENOUS at 08:30

## 2024-02-07 RX ADMIN — Medication 1 CAPSULE: at 15:39

## 2024-02-07 RX ADMIN — CINACALCET 60 MG: 30 TABLET, FILM COATED ORAL at 08:31

## 2024-02-07 RX ADMIN — CHLORHEXIDINE GLUCONATE 15 ML: 1.2 SOLUTION ORAL at 08:30

## 2024-02-07 RX ADMIN — CHLORHEXIDINE GLUCONATE 15 ML: 1.2 SOLUTION ORAL at 22:00

## 2024-02-07 RX ADMIN — CALCIUM ACETATE 667 MG: 667 CAPSULE ORAL at 10:39

## 2024-02-07 RX ADMIN — ESCITALOPRAM OXALATE 20 MG: 20 TABLET ORAL at 08:30

## 2024-02-07 RX ADMIN — Medication 250 MG: at 08:30

## 2024-02-07 RX ADMIN — FAMOTIDINE 40 MG: 20 TABLET, FILM COATED ORAL at 08:29

## 2024-02-07 RX ADMIN — CALCIUM ACETATE 667 MG: 667 CAPSULE ORAL at 17:53

## 2024-02-07 NOTE — PROGRESS NOTES
NEPHROLOGY PROGRESS NOTE   Mateus Mckeon 38 y.o. male MRN: [id number]  Unit/Bed#: ICU 08 Encounter: [id number]  Reason for Consult: ESRD    ASSESSMENT AND PLAN:  37 yo with PMH of ESRD on HD at [facility] on an MWF, hypertension with multiple admissions with fluid overload and hypertension urgency, CVA, ICH, p/w occipital headache with BP of 228/123.  Nephrology is consulted for management of ESRD     PLAN:     #ESRD on HD MWF:  Dialysis unit/days: [facility]  Access: AV graft  Had dialysis Monday and Tuesday for ultrafiltration total 6.5 L  Plan for dialysis today with fluid removal  Renal Diet  Fluid restriction 1-1.5L/d  Adjust medications to GFR<10  Avoid opioids   Patient can continue dialysis at outpatient dialysis unit discharge     #Volume status/hypertension:  Volume: Hypervolemic with lower extremity edema  Blood pressure: Hypertensive, /75, goal less than 140/90  Off nicardipine drip   Low-sodium diet  Plan to challenge weight today  Fluid restriction  On clonidine patch status post nicardipine drip  Hydralazine 100 mg 3 times daily  Labetalol 400 mg tid   lisinopril 40 mg daily  Minoxidil 2.5 mg twice daily  Nifedipine 60 mg twice daily  Advised patient to continue with low-sodium diet and strict fluid restriction        #Secondary Hyperparasitoidism   on Cinacalcet 30 daily     # Anemia of Kidney Disease  Current hemoglobin: 7.5 mg/dL  Treatment:  Not on EPO due to CVA  Transfuse for hemoglobin less than 7.0 per primary service       # Hypertensive urgency  Recurrent admissions   Nicardipine on and off due to respiratory hypertension   Ultrafiltration on dialysis total of 6.5 L of fluid removal  UF on HD to optimize volume status  BP meds as above     #DM  HbA1c 6  Advised to maintain a good DM control to prevent more complications   Maintain healthy diet (vegetables, fruits, whole grains, nonfat or low fat)  Weight loss  Physical activity (5 to 10 minutes to start the increase to 30 min a  day)    SUBJECTIVE:  Patient seen and examined at bedside. No chest pain, shortness of breath, nausea,    OBJECTIVE:  Current Weight: Weight - Scale: 95.6 kg (210 lb 12.2 oz)  Vitals:    02/07/24 0700   BP: 161/75   Pulse: 70   Resp:    Temp: 98.7 °F (37.1 °C)   SpO2: 97%       Intake/Output Summary (Last 24 hours) at 2/7/2024 1125  Last data filed at 2/7/2024 0800  Gross per 24 hour   Intake 1920.16 ml   Output 3500 ml   Net -1579.84 ml     Wt Readings from Last 3 Encounters:   02/07/24 95.6 kg (210 lb 12.2 oz)   01/27/24 91.3 kg (201 lb 4.5 oz)   01/23/24 94.5 kg (208 lb 5.4 oz)     Temp Readings from Last 3 Encounters:   02/07/24 98.7 °F (37.1 °C)   01/29/24 98.3 °F (36.8 °C) (Oral)   01/27/24 97.9 °F (36.6 °C) (Oral)     BP Readings from Last 3 Encounters:   02/07/24 161/75   01/29/24 (!) 193/93   01/27/24 (!) 187/87     Pulse Readings from Last 3 Encounters:   02/07/24 70   01/29/24 79   01/27/24 69        General:  no acute distress at this time  Skin:  No acute rash  Eyes:  No scleral icterus and noninjected  ENT:  mucous membranes moist  Neck:  no carotid bruits  Chest:  Clear to auscultation percussion, good respiratory effort, no use of accessory respiratory muscles  CVS:  Regular rate and rhythm without rub   Abdomen:  soft and nontender   Extremities: lower extremity edema  Neuro:  No gross focality  Psych:  Alert , cooperative   Vascular access AV graft:      Medications:    Current Facility-Administered Medications:     aspirin (ECOTRIN LOW STRENGTH) EC tablet 81 mg, 81 mg, Oral, Daily, DEISI Galdamez, 81 mg at 02/07/24 0830    atorvastatin (LIPITOR) tablet 40 mg, 40 mg, Oral, QPM, DEISI Galdamez, 40 mg at 02/06/24 1803    b complex-vitamin C-folic acid (RENAL) capsule 1 capsule, 1 capsule, Oral, Daily With Dinner, Anibal Stacy MD, 1 capsule at 02/06/24 1534    calcium acetate (PHOSLO) capsule 667 mg, 667 mg, Oral, TID, Lashanda Wills DO, 667 mg at 02/07/24 1035     chlorhexidine (PERIDEX) 0.12 % oral rinse 15 mL, 15 mL, Mouth/Throat, Q12H HALIE, DEISI Galdamez, 15 mL at 02/07/24 0830    cinacalcet (SENSIPAR) tablet 60 mg, 60 mg, Oral, Daily, DEISI Galdamez, 60 mg at 02/07/24 0831    cloNIDine (CATAPRES-TTS-3) 0.3 mg/24 hr TD weekly patch, 1 patch, Transdermal, Weekly, DEISI Galdamez, 0.3 mg at 02/04/24 1018    escitalopram (LEXAPRO) tablet 20 mg, 20 mg, Oral, Daily, DEISI Galdamez, 20 mg at 02/07/24 0830    famotidine (PEPCID) tablet 40 mg, 40 mg, Oral, QAM, DEISI Galdamez, 40 mg at 02/07/24 0829    gabapentin (NEURONTIN) capsule 200 mg, 200 mg, Oral, HS, DEISI Galdamez, 200 mg at 02/06/24 2009    heparin (porcine) subcutaneous injection 5,000 Units, 5,000 Units, Subcutaneous, Q8H HALIE, DEISI Galdamez, 5,000 Units at 02/07/24 0549    hydrALAZINE (APRESOLINE) tablet 100 mg, 100 mg, Oral, TID, Lashanda Wills DO, 100 mg at 02/07/24 0829    insulin aspart (NovoLOG) FOR PUMP REFILLS 100 Units, 100 Units, Subcutaneous Insulin Pump, Daily PRN, Lashanda Wills DO    labetalol (NORMODYNE) tablet 400 mg, 400 mg, Oral, TID, Lashanda Wills DO, 400 mg at 02/07/24 0829    lisinopril (ZESTRIL) tablet 40 mg, 40 mg, Oral, Daily, Lashanda Wills DO, 40 mg at 02/07/24 0829    minoxidil (LONITEN) tablet 5 mg, 5 mg, Oral, BID, Maciel Chowdhury MD, 5 mg at 02/07/24 0829    NIFEdipine (PROCARDIA XL) 24 hr tablet 60 mg, 60 mg, Oral, BID, Lashanda Wills DO, 60 mg at 02/07/24 0830    ondansetron (ZOFRAN) injection 4 mg, 4 mg, Intravenous, Q6H PRN, Lashanda Wills DO, 4 mg at 02/07/24 0830    saccharomyces boulardii (FLORASTOR) capsule 250 mg, 250 mg, Oral, Daily, DEISI Galdamez, 250 mg at 02/07/24 0830    traZODone (DESYREL) tablet 25 mg, 25 mg, Oral, HS PRN, DEISI Galdamez    Laboratory Results:  Results from last 7 days   Lab Units 02/07/24  0450 02/06/24  0546  "02/05/24  0527 02/04/24  0936 02/04/24  0535 02/03/24  2220   WBC Thousand/uL 4.42 5.05 4.65 5.49  --  5.51   HEMOGLOBIN g/dL 7.5* 7.9* 7.4* 7.9*  --  8.2*   HEMATOCRIT % 23.8* 25.0* 23.6* 25.0*  --  25.7*   PLATELETS Thousands/uL 201 231 238 239  --  268   SODIUM mmol/L 140 143 141  --  141 143   POTASSIUM mmol/L 4.2 4.3 4.2  --  4.0 4.3   CHLORIDE mmol/L 101 102 101  --  101 100   CO2 mmol/L 29 32 30  --  30 33*   BUN mg/dL 34* 22 30*  --  20 19   CREATININE mg/dL 9.00* 7.25* 10.88*  --  8.56* 8.36*   CALCIUM mg/dL 8.2* 8.2* 8.0*  --  8.3* 8.7   MAGNESIUM mg/dL 2.1 2.1 2.2  --  2.1  --    PHOSPHORUS mg/dL 5.1* 3.7 4.3  --  3.3  --        CT head without contrast   Final Result by Garland Caro DO (02/03 2332)      Resolution of previously identified intracranial hemorrhage. No acute intracranial hemorrhage. See full report for detailed findings.                  Workstation performed: [id number]             Portions of the record may have been created with voice recognition software. Occasional wrong word or \"sound a like\" substitutions may have occurred due to the inherent limitations of voice recognition software. Read the chart carefully and recognize, using context, where substitutions have occurred.    "

## 2024-02-07 NOTE — ASSESSMENT & PLAN NOTE
Lab Results   Component Value Date    HGBA1C 6.0 (H) 01/14/2024       Recent Labs     02/05/24  1759 02/06/24  0019 02/06/24  1228 02/06/24  1807   POCGLU 190* 171* 152* 194*         Blood Sugar Average: Last 72 hrs:  (P) 165.375  Continue PTA gabapentin 200qhs

## 2024-02-07 NOTE — PROGRESS NOTES
[facility]  Progress Note  Name: Mateus Mckeon I  MRN: [id number]  Unit/Bed#: ICU 08 I Date of Admission: 2/3/2024   Date of Service: 2/7/2024 I Hospital Day: 3    Assessment/Plan   * Hypertensive urgency  Assessment & Plan  Temp:  [98 °F (36.7 °C)-98.4 °F (36.9 °C)] 98.1 °F (36.7 °C)  HR:  [66-86] 72  Resp:  [14-16] 14  BP: (112-181)/(62-90) 142/73    POA, patient presented to the emergency department complaining of headache and was found to be hypertensive to 238/122.  He was given labetalol IV 20 mg without any improvement in his blood pressure and was started on Cardene drip, transferred to ICU for further management.  Headache resolved with improved BP.  Patient has intermittently required cardine gtt due to ongoing HTN  Goal SBP < 180  Continue home medications hydralazine 100mg TID, labetalol 400mg TID, lisinopril 40mg qd, clonidine 0.3 mg/24hr TD weekly patch  Per nephrology recommendations, decreased nifedipine from 90mg BID to 60mg BID and added minoxidil 2.5mg BID.  2/5 3.9L off with dialysis  2/6 second 3.5 L off via dialysis with improvement of blood pressures    Plan:  - Continue to monitor blood pressures  - Try to keep off of Cardene drip  - Nephrology consulted, appreciate recommendations    ESRD on hemodialysis (HCC)  Assessment & Plan  Lab Results   Component Value Date    EGFR 8 02/06/2024    EGFR 5 02/05/2024    EGFR 6 02/04/2024    CREATININE 7.25 (H) 02/06/2024    CREATININE 10.88 (H) 02/05/2024    CREATININE 8.56 (H) 02/04/2024     Patient with known history of ESRD    Stable  Nephrology consulted to assist with hemodialysis MWF  Per nephrology recs, currently holding phoslo in the setting of normal-range phosphorus    Type 1 diabetes mellitus on insulin therapy (HCC)  Assessment & Plan  Lab Results   Component Value Date    HGBA1C 6.0 (H) 01/14/2024       Recent Labs     02/05/24  1759 02/06/24  0019 02/06/24  1228 02/06/24  1807   POCGLU 190* 171* 152* 194*        Blood Sugar Average: Last 72 hrs:  (P) 165.375  Patient has insulin pump, can continue to use and assist him as needed if sugars become elevated    Cerebrovascular accident (CVA) of left pontine structure ([facility])  Assessment & Plan  PT/OT ordered, patient with residual RUE weakness following recent CVA  Needs walker for mobility  OOB with assistance  Fall precautions    Diabetic neuropathy ([facility])  Assessment & Plan  Lab Results   Component Value Date    HGBA1C 6.0 (H) 01/14/2024       Recent Labs     02/05/24  1759 02/06/24  0019 02/06/24  1228 02/06/24  1807   POCGLU 190* 171* 152* 194*         Blood Sugar Average: Last 72 hrs:  (P) 165.375  Continue PTA gabapentin 200qhs    Gastroesophageal reflux disease without esophagitis  Assessment & Plan  Continue PTA famotidine 40mg qd    Depression  Assessment & Plan  Continue PTA escitalopram 20mg qd    Generalized anxiety disorder  Assessment & Plan  Continue PTA escitalopram 20mg qd    Coronary artery disease involving native coronary artery of native heart without angina pectoris  Assessment & Plan  Continue PTA ASA 81mg qd    Hyperlipidemia  Assessment & Plan  Continue PTA atorvastatin 40mg qd    Essential (primary) hypertension  Assessment & Plan  Medication regimen as above for hypertensive urgency    Anemia due to chronic kidney disease, on chronic dialysis ([facility])  Assessment & Plan  Lab Results   Component Value Date    EGFR 8 02/06/2024    EGFR 5 02/05/2024    EGFR 6 02/04/2024    CREATININE 7.25 (H) 02/06/2024    CREATININE 10.88 (H) 02/05/2024    CREATININE 8.56 (H) 02/04/2024     Recent Labs     02/04/24  0936 02/05/24  0527 02/06/24  0529   HGB 7.9* 7.4* 7.9*   HCT 25.0* 23.6* 25.0*     Stable  Trend Hgb, replete if <7             Disposition: Med Surg    ICU Core Measures     A: Assess, Prevent, and Manage Pain Has pain been assessed? Yes  Need for changes to pain regimen? No   B: Both SAT/SAT  N/A   C: Choice of Sedation RASS Goal: 0 Alert and Calm  Need  for changes to sedation or analgesia regimen? No   D: Delirium CAM-ICU: Negative   E: Early Mobility  Plan for early mobility? Yes   F: Family Engagement Plan for family engagement today? Yes         Prophylaxis:  VTE VTE covered by:  heparin (porcine), Subcutaneous, 5,000 Units at 02/06/24 2009       Stress Ulcer  covered byfamotidine (PEPCID) 40 MG tablet [[id number]] (Long-Term Med), famotidine (PEPCID) tablet 40 mg [[id number]]         Significant 24hr Events   HPI:  38-year-old male with history of ESRD on HD WF, type 1 diabetes on insulin pump, diabetic neuropathy, GERD, depression, anxiety, CAD, hyperlipidemia, hypertension, anemia secondary to ESRD, recent CVA with subarachnoid hemorrhage with persistent right-sided deficit.  Presented to the ED on 2/3 for headache and hypertension.  In the ED was found to have a blood pressure of 228/123 despite reported compliance with his medication.  In the ED he was given 1 mg of labetalol without treatment of blood pressure, started on Cardene drip and admitted to the ICU for further management.  He continued to require Cardene drip on and off, and underwent hemodialysis twice, with the first removing 3.9 L in the second row with 3.5 L.    24hr events:  Patient received dialysis yesterday 2/5.  Reports of headaches that improved with Tylenol, blood pressures have improved since dialysis, has not needed Cardene drip since 5 PM 2/5. Patient was able to ambulate around the unit with assistance during the evening.     Subjective   Review of Systems   Constitutional:  Negative for chills and fever.   HENT:  Negative for trouble swallowing.    Eyes:  Negative for visual disturbance.   Respiratory:  Negative for chest tightness and shortness of breath.    Cardiovascular:  Negative for chest pain, palpitations and leg swelling.   Gastrointestinal:  Negative for nausea and vomiting.   Genitourinary:  Negative for difficulty urinating and dysuria.   Neurological:  Negative for  dizziness, light-headedness and headaches.   Psychiatric/Behavioral:  Negative for dysphoric mood.       Objective                            Vitals I/O      Most Recent Min/Max in 24hrs   Temp 98.1 °F (36.7 °C) Temp  Min: 98 °F (36.7 °C)  Max: 98.4 °F (36.9 °C)   Pulse 72 Pulse  Min: 66  Max: 86   Resp 14 Resp  Min: 14  Max: 16   /73 BP  Min: 112/70  Max: 181/83   O2 Sat 98 % SpO2  Min: 97 %  Max: 100 %      Intake/Output Summary (Last 24 hours) at 2/7/2024 0441  Last data filed at 2/6/2024 2001  Gross per 24 hour   Intake 1809.16 ml   Output 3500 ml   Net -1690.84 ml       Diet Renal; Lo Phosphorus; No; No; Consistent Carbohydrate Diet Level 2 (5 carb servings/75 grams CHO/meal), Sodium 2 Gm    Invasive Monitoring           Physical Exam   Physical Exam  Vitals and nursing note reviewed.   Eyes:      Extraocular Movements: Extraocular movements intact.      Pupils: Pupils are equal, round, and reactive to light.   HENT:      Head: Atraumatic.      Mouth/Throat:      Mouth: Mucous membranes are moist.   Cardiovascular:      Rate and Rhythm: Normal rate and regular rhythm.      Pulses: No decreased pulses.   Musculoskeletal:      Right lower leg: No edema.      Left lower leg: No edema.   Abdominal: General: Bowel sounds are normal. There is no distension.      Palpations: Abdomen is soft.      Tenderness: There is no abdominal tenderness. There is no guarding.   Constitutional:       General: He is not in acute distress.     Appearance: He is not ill-appearing or toxic-appearing.   Neurological:      Mental Status: He is alert and oriented to person, place and time.      Cranial Nerves: No dysarthria or facial asymmetry.            Diagnostic Studies      EKG: Sinus rhythm on the monitor  Imaging:   No new imaging last 24 hours   I have personally reviewed pertinent reports.       Medications:  Scheduled PRN   aspirin, 81 mg, Daily  atorvastatin, 40 mg, QPM  b complex-vitamin C-folic acid, 1 capsule, Daily  With Dinner  chlorhexidine, 15 mL, Q12H HALIE  cinacalcet, 60 mg, Daily  cloNIDine, 1 patch, Weekly  escitalopram, 20 mg, Daily  famotidine, 40 mg, QAM  gabapentin, 200 mg, HS  heparin (porcine), 5,000 Units, Q8H HALIE  hydrALAZINE, 100 mg, TID  labetalol, 400 mg, TID  lisinopril, 40 mg, Daily  minoxidil, 2.5 mg, BID  NIFEdipine, 60 mg, BID  saccharomyces boulardii, 250 mg, Daily      insulin aspart, 100 Units, Daily PRN  traZODone, 25 mg, HS PRN       Continuous    niCARdipine, 1-15 mg/hr, Last Rate: Stopped (02/06/24 1700)         Labs:    CBC    Recent Labs     02/05/24  0527 02/06/24  0529   WBC 4.65 5.05   HGB 7.4* 7.9*   HCT 23.6* 25.0*    231     BMP    Recent Labs     02/05/24  0527 02/06/24  0529   SODIUM 141 143   K 4.2 4.3    102   CO2 30 32   AGAP 10 9   BUN 30* 22   CREATININE 10.88* 7.25*   CALCIUM 8.0* 8.2*       Coags    No recent results     Additional Electrolytes  Recent Labs     02/05/24  0527 02/06/24  0529   MG 2.2 2.1   PHOS 4.3 3.7          Blood Gas    No recent results  No recent results LFTs  Recent Labs     02/06/24  0529   ALT 8   AST 13   ALKPHOS 74   ALB 3.6   TBILI 0.53       Infectious  No recent results  Glucose  Recent Labs     02/05/24  0527 02/06/24  0529   GLUC 191* 146*                   Abeba Terrazas MD     done

## 2024-02-07 NOTE — ASSESSMENT & PLAN NOTE
Lab Results   Component Value Date    EGFR 8 02/06/2024    EGFR 5 02/05/2024    EGFR 6 02/04/2024    CREATININE 7.25 (H) 02/06/2024    CREATININE 10.88 (H) 02/05/2024    CREATININE 8.56 (H) 02/04/2024     Recent Labs     02/04/24  0936 02/05/24  0527 02/06/24  0529   HGB 7.9* 7.4* 7.9*   HCT 25.0* 23.6* 25.0*     Stable  Trend Hgb, replete if <7

## 2024-02-07 NOTE — PLAN OF CARE
Problem: PAIN - ADULT  Goal: Verbalizes/displays adequate comfort level or baseline comfort level  Description: Interventions:  - Encourage patient to monitor pain and request assistance  - Assess pain using appropriate pain scale  - Administer analgesics based on type and severity of pain and evaluate response  - Implement non-pharmacological measures as appropriate and evaluate response  - Consider cultural and social influences on pain and pain management  - Notify physician/advanced practitioner if interventions unsuccessful or patient reports new pain  Outcome: Progressing     Problem: INFECTION - ADULT  Goal: Absence or prevention of progression during hospitalization  Description: INTERVENTIONS:  - Assess and monitor for signs and symptoms of infection  - Monitor lab/diagnostic results  - Monitor all insertion sites, i.e. indwelling lines, tubes, and drains  - Monitor endotracheal if appropriate and nasal secretions for changes in amount and color  - Cruger appropriate cooling/warming therapies per order  - Administer medications as ordered  - Instruct and encourage patient and family to use good hand hygiene technique  - Identify and instruct in appropriate isolation precautions for identified infection/condition  Outcome: Progressing  Goal: Absence of fever/infection during neutropenic period  Description: INTERVENTIONS:  - Monitor WBC    Outcome: Progressing     Problem: SAFETY ADULT  Goal: Patient will remain free of falls  Description: INTERVENTIONS:  - Educate patient/family on patient safety including physical limitations  - Instruct patient to call for assistance with activity   - Consult OT/PT to assist with strengthening/mobility   - Keep Call bell within reach  - Keep bed low and locked with side rails adjusted as appropriate  - Keep care items and personal belongings within reach  - Initiate and maintain comfort rounds  - Make Fall Risk Sign visible to staff  - Apply yellow socks and bracelet  for high fall risk patients  - Consider moving patient to room near nurses station  Outcome: Progressing  Goal: Maintain or return to baseline ADL function  Description: INTERVENTIONS:  -  Assess patient's ability to carry out ADLs; assess patient's baseline for ADL function and identify physical deficits which impact ability to perform ADLs (bathing, care of mouth/teeth, toileting, grooming, dressing, etc.)  - Assess/evaluate cause of self-care deficits   - Assess range of motion  - Assess patient's mobility; develop plan if impaired  - Assess patient's need for assistive devices and provide as appropriate  - Encourage maximum independence but intervene and supervise when necessary  - Involve family in performance of ADLs  - Assess for home care needs following discharge   - Consider OT consult to assist with ADL evaluation and planning for discharge  - Provide patient education as appropriate  Outcome: Progressing  Goal: Maintains/Returns to pre admission functional level  Description: INTERVENTIONS:  - Perform AM-PAC 6 Click Basic Mobility/ Daily Activity assessment daily.  - Set and communicate daily mobility goal to care team and patient/family/caregiver.   - Collaborate with rehabilitation services on mobility goals if consulted  - Out of bed for toileting  - Record patient progress and toleration of activity level   Outcome: Progressing     Problem: DISCHARGE PLANNING  Goal: Discharge to home or other facility with appropriate resources  Description: INTERVENTIONS:  - Identify barriers to discharge w/patient and caregiver  - Arrange for needed discharge resources and transportation as appropriate  - Identify discharge learning needs (meds, wound care, etc.)  - Arrange for interpretive services to assist at discharge as needed  - Refer to Case Management Department for coordinating discharge planning if the patient needs post-hospital services based on physician/advanced practitioner order or complex needs  related to functional status, cognitive ability, or social support system  Outcome: Progressing

## 2024-02-07 NOTE — ASSESSMENT & PLAN NOTE
Lab Results   Component Value Date    HGBA1C 6.0 (H) 01/14/2024       Recent Labs     02/05/24  1759 02/06/24  0019 02/06/24  1228 02/06/24  1807   POCGLU 190* 171* 152* 194*       Blood Sugar Average: Last 72 hrs:  (P) 165.375  Patient has insulin pump, can continue to use and assist him as needed if sugars become elevated

## 2024-02-07 NOTE — DISCHARGE INSTR - AVS FIRST PAGE
Dear Mateus Mckeon,     It was our pleasure to care for you here at [facility].  It is our hope that we were always able to exceed the expected standards for your care during your stay.  You were hospitalized due to hypertensive urgency.  You were cared for in the ICU by Lashanda Wills DO under the service of Angel Zambrano MD with [facility] who covers for your primary care physician (PCP), Dania Sher DO, while you were hospitalized.  If you have any questions or concerns related to this hospitalization, you may contact us at .  For follow up as well as any medication refills, we recommend that you follow up with your primary care physician.  A registered nurse will reach out to you by phone within a few days after your discharge to answer any additional questions that you may have after going home.  However, at this time we provide for you here, the most important instructions / recommendations at discharge:     Notable Medication Adjustments -   STOP taking your nifedipine (Procardia XL) 90mg twice a day. INSTEAD take nifedipine (Procardia XL) 60mg twice a day.  START taking minoxidil (Loniten) 5mg twice a day.  You were started on this medication in the hospital.  Testing Required after Discharge -   None  Important follow up information -   Follow up with your nephrology team regarding whether you should be receiving Epo therapy with your dialysis.   Make an appointment to follow up with your PCP within one week.  Follow up with sleep medicine regarding getting your previously diagnosed sleep apnea.  A referral has been placed on your behalf.  Attend your regularly scheduled dialysis appointments and discuss with them challenging your potential dry weight.  Other Instructions -   It is the recommendation of the nephrology team that you consume BETWEEN 1L-1.5L daily (33.3 to 50 fluid ounces daily) from ALL sources of liquid  (juice, water, coffee, soft drinks, soups, smoothies, etc), and that you follow a low salt diet with less than 2 grams (2000mg) of total daily salt intake.  These recommendations may both work to keep your blood pressure from rising.  Please review this entire after visit summary as additional general instructions including medication list, appointments, activity, diet, any pertinent wound care, and other additional recommendations from your care team that may be provided for you.      Sincerely,     Lashanda Wills, DO

## 2024-02-07 NOTE — DISCHARGE SUMMARY
Formerly [facility], [facility]  Discharge- Mateus Mckeon 1985, 38 y.o. male MRN: [id number]  Unit/Bed#: ICU 08 Encounter: [id number]  Primary Care Provider: Dania Sher DO   Date and time admitted to hospital: 2/3/2024  9:51 PM    ESRD on hemodialysis (HCC)  Assessment & Plan  Lab Results   Component Value Date    EGFR 10 02/08/2024    EGFR 6 02/07/2024    EGFR 8 02/06/2024    CREATININE 6.10 (H) 02/08/2024    CREATININE 9.00 (H) 02/07/2024    CREATININE 7.25 (H) 02/06/2024     Lab Results   Component Value Date    IRON 89 02/07/2024    FERRITIN 495 (H) 02/07/2024    TIBC 162 (L) 02/07/2024    CONCFE 55 (H) 02/07/2024     Patient with known history of ESRD    Stable  Nephrology consulted to assist with hemodialysis MWF, additional dialysis sessions PRN  Continue phoslo on discharge  Recommend challenging dry weight at dialysis sessions    Type 1 diabetes mellitus on insulin therapy (HCC)  Assessment & Plan  Lab Results   Component Value Date    HGBA1C 6.0 (H) 01/14/2024       Recent Labs     02/05/24  1759 02/06/24  0019 02/06/24  1228 02/06/24  1807   POCGLU 190* 171* 152* 194*         Blood Sugar Average: Last 72 hrs:  (P) 167.6  Patient has insulin pump, recommend fasting goal BG <140    Cerebrovascular accident (CVA) of left pontine structure (HCC)  Assessment & Plan  Continue with outpatient PT/OT for post-stroke rehabilitation    Diabetic neuropathy (HCC)  Assessment & Plan  Lab Results   Component Value Date    HGBA1C 6.0 (H) 01/14/2024       Recent Labs     02/05/24  1759 02/06/24  0019 02/06/24  1228 02/06/24  1807   POCGLU 190* 171* 152* 194*         Blood Sugar Average: Last 72 hrs:  (P) 167.6  Continue PTA gabapentin 200 qhs    Gastroesophageal reflux disease without esophagitis  Assessment & Plan  Continue PTA famotidine 40mg qd    Depression  Assessment & Plan  Continue PTA escitalopram 20mg qd    Generalized anxiety disorder  Assessment & Plan  Continue PTA escitalopram 20mg  qd    Coronary artery disease involving native coronary artery of native heart without angina pectoris  Assessment & Plan  Continue PTA ASA 81mg qd    Hyperlipidemia  Assessment & Plan  Continue PTA atorvastatin 40mg qd    Essential (primary) hypertension  Assessment & Plan  Medication regimen as above for hypertensive urgency    Anemia due to chronic kidney disease, on chronic dialysis (HCC)  Assessment & Plan  Lab Results   Component Value Date    EGFR 10 02/08/2024    EGFR 6 02/07/2024    EGFR 8 02/06/2024    CREATININE 6.10 (H) 02/08/2024    CREATININE 9.00 (H) 02/07/2024    CREATININE 7.25 (H) 02/06/2024     Recent Labs     02/06/24  0529 02/07/24  0450 02/08/24  0607   HGB 7.9* 7.5* 8.2*   HCT 25.0* 23.8* 25.4*       Lab Results   Component Value Date    [id number] 1,427 (H) 01/07/2024     Lab Results   Component Value Date    FOLATE 10.8 01/07/2024     Per discussion with clinical pharmacy, Epo not recommended for patients with a Hgb >11.     Plan:  Trend Hgb, replete if <7  F/up iron studies  F/up outpatient with nephrology to consider restarting Epo          Medical Problems       Resolved Problems  Date Reviewed: 2/7/2024            Resolved    Pulmonary HTN (HCC) 2/4/2024     Resolved by  Abeba Terrazas MD          Admission Date:   Admission Orders (From admission, onward)       Ordered        02/04/24 0043  INPATIENT ADMISSION  Once                            Admitting Diagnosis: Hypertension [I10]  Hypertensive urgency [I16.0]  ESRD on hemodialysis (HCC) [N18.6, Z99.2]  Headache [R51.9]  Cerebrovascular accident (CVA) of left pontine structure (HCC) [I63.9]    HPI: Patient is a 38-year-old male with history of ESRD on hemodialysis Monday Wednesday Friday, pulmonary hypertension, type 1 diabetes, diabetic nephropathy, GERD, depression, anxiety, coronary artery disease, hyperlipidemia, hypertension, anemia secondary to ESRD, recent CVA with subarachnoid hemorrhage with persistent right-sided deficits  who initially presented to the ED complaining of headache and high blood pressure at home.  He was evaluated in the ED and had a blood pressure of 228/123.  He reports he has been compliant with medication, including taking his evening meds but 1 hour prior to arrival.  He was given labetalol 20 mg IV without significant decrease in blood pressure and therefore started on a Cardene drip, admitted to ICU for further management.    Procedures Performed:   Orders Placed This Encounter   Procedures    Critical Care    ED ECG Documentation Only       Summary of Hospital Course: While in the ICU, patient intermittently required Cardene drip to keep systolic blood pressure less than 180.  He underwent hemodialysis 3 times, with fluid challenge 3.9 L removed on 2/5, 3.5 L removed on 2/6, 2.5 L removed on 2/7.  Patient's home nifedipine was decreased from 90 mg twice daily to 60 mg twice daily.  Patient was also started on minoxidil, uptitrated to 5 mg twice daily.  His blood pressures were monitored overnight and he remained stable for discharge home with outpatient follow up recommended with sleep medicine, nephrology, and PCP.    Significant Findings, Care, Treatment and Services Provided: Inpatient hemodialysis x3 sessions  Medication adjustment/titration    Complications: None    Condition at Discharge: stable         Discharge instructions/Information to patient and family:   See after visit summary for information provided to patient and family.      Provisions for Follow-Up Care:  See after visit summary for information related to follow-up care and any pertinent home health orders.      PCP: Dania Sher DO    Disposition: Home    Planned Readmission: No    Discharge Statement   I spent 30 minutes discharging the patient. This time was spent on the day of discharge. I had direct contact with the patient on the day of discharge. Additional documentation is required if more than 30 minutes were spent on  discharge.     Discharge Medications:  See after visit summary for reconciled discharge medications provided to patient and family.

## 2024-02-07 NOTE — PLAN OF CARE
Problem: PAIN - ADULT  Goal: Verbalizes/displays adequate comfort level or baseline comfort level  Description: Interventions:  - Encourage patient to monitor pain and request assistance  - Assess pain using appropriate pain scale  - Administer analgesics based on type and severity of pain and evaluate response  - Implement non-pharmacological measures as appropriate and evaluate response  - Consider cultural and social influences on pain and pain management  - Notify physician/advanced practitioner if interventions unsuccessful or patient reports new pain  Outcome: Progressing     Problem: INFECTION - ADULT  Goal: Absence or prevention of progression during hospitalization  Description: INTERVENTIONS:  - Assess and monitor for signs and symptoms of infection  - Monitor lab/diagnostic results  - Monitor all insertion sites, i.e. indwelling lines, tubes, and drains  - Monitor endotracheal if appropriate and nasal secretions for changes in amount and color  - [place] appropriate cooling/warming therapies per order  - Administer medications as ordered  - Instruct and encourage patient and family to use good hand hygiene technique  - Identify and instruct in appropriate isolation precautions for identified infection/condition  Outcome: Progressing  Goal: Absence of fever/infection during neutropenic period  Description: INTERVENTIONS:  - Monitor WBC    Outcome: Progressing     Problem: SAFETY ADULT  Goal: Patient will remain free of falls  Description: INTERVENTIONS:  - Educate patient/family on patient safety including physical limitations  - Instruct patient to call for assistance with activity   - Consult OT/PT to assist with strengthening/mobility   - Keep Call bell within reach  - Keep bed low and locked with side rails adjusted as appropriate  - Keep care items and personal belongings within reach  - Initiate and maintain comfort rounds  - Make Fall Risk Sign visible to staff  - Offer Toileting every 2 Hours,  in advance of need  - Initiate/Maintain bed alarm  - Obtain necessary fall risk management equipment:   - Apply yellow socks and bracelet for high fall risk patients  - Consider moving patient to room near nurses station  Outcome: Progressing  Goal: Maintain or return to baseline ADL function  Description: INTERVENTIONS:  -  Assess patient's ability to carry out ADLs; assess patient's baseline for ADL function and identify physical deficits which impact ability to perform ADLs (bathing, care of mouth/teeth, toileting, grooming, dressing, etc.)  - Assess/evaluate cause of self-care deficits   - Assess range of motion  - Assess patient's mobility; develop plan if impaired  - Assess patient's need for assistive devices and provide as appropriate  - Encourage maximum independence but intervene and supervise when necessary  - Involve family in performance of ADLs  - Assess for home care needs following discharge   - Consider OT consult to assist with ADL evaluation and planning for discharge  - Provide patient education as appropriate  Outcome: Progressing  Goal: Maintains/Returns to pre admission functional level  Description: INTERVENTIONS:  - Perform AM-PAC 6 Click Basic Mobility/ Daily Activity assessment daily.  - Set and communicate daily mobility goal to care team and patient/family/caregiver.   - Collaborate with rehabilitation services on mobility goals if consulted  - Perform Range of Motion 3 times a day.  - Reposition patient every 2 hours.  - Dangle patient 2 times a day  - Stand patient 2 times a day  - Ambulate patient 2 times a day  - Out of bed to chair 2 times a day   - Out of bed for meals 2 times a day  - Out of bed for toileting  - Record patient progress and toleration of activity level   Outcome: Progressing     Problem: DISCHARGE PLANNING  Goal: Discharge to home or other facility with appropriate resources  Description: INTERVENTIONS:  - Identify barriers to discharge w/patient and caregiver  -  Arrange for needed discharge resources and transportation as appropriate  - Identify discharge learning needs (meds, wound care, etc.)  - Arrange for interpretive services to assist at discharge as needed  - Refer to Case Management Department for coordinating discharge planning if the patient needs post-hospital services based on physician/advanced practitioner order or complex needs related to functional status, cognitive ability, or social support system  Outcome: Progressing     Problem: Knowledge Deficit  Goal: Patient/family/caregiver demonstrates understanding of disease process, treatment plan, medications, and discharge instructions  Description: Complete learning assessment and assess knowledge base.  Interventions:  - Provide teaching at level of understanding  - Provide teaching via preferred learning methods  Outcome: Progressing     Problem: METABOLIC, FLUID AND ELECTROLYTES - ADULT  Goal: Electrolytes maintained within normal limits  Description: INTERVENTIONS:  - Monitor labs and assess patient for signs and symptoms of electrolyte imbalances  - Administer electrolyte replacement as ordered  - Monitor response to electrolyte replacements, including repeat lab results as appropriate  - Instruct patient on fluid and nutrition as appropriate  Outcome: Progressing  Goal: Fluid balance maintained  Description: INTERVENTIONS:  - Monitor labs   - Monitor I/O and WT  - Instruct patient on fluid and nutrition as appropriate  - Assess for signs & symptoms of volume excess or deficit  Outcome: Progressing

## 2024-02-07 NOTE — PLAN OF CARE
Target UF Goal  2  L as tolerated. Patient dialyzing for 4 hours on 3 K bath for serum K of  4.2  per protocol. Treatment plan reviewed with Nephrology.     Post-Dialysis RN Treatment Note    Blood Pressure:  Pre 137/71 mm/Hg  Post 179/79 mmHg   EDW  93 kg    Weight:  Pre 93.8 kg   Post 90.9 kg   Mode of weight measurement: Standing Scale   Volume Removed  2501 ml    Treatment duration 240 minutes    NS given  N/A    Treatment shortened? No   Medications given during Rx None Reported   Estimated Kt/V  1.3   Access type: AV fistula   Access Issues: No    Report called to primary nurse   Yes Tamara Avilez RN    Problem: METABOLIC, FLUID AND ELECTROLYTES - ADULT  Goal: Electrolytes maintained within normal limits  Description: INTERVENTIONS:  - Monitor labs and assess patient for signs and symptoms of electrolyte imbalances  - Administer electrolyte replacement as ordered  - Monitor response to electrolyte replacements, including repeat lab results as appropriate  - Instruct patient on fluid and nutrition as appropriate  Outcome: Progressing  Goal: Fluid balance maintained  Description: INTERVENTIONS:  - Monitor labs   - Monitor I/O and WT  - Instruct patient on fluid and nutrition as appropriate  - Assess for signs & symptoms of volume excess or deficit  Outcome: Progressing

## 2024-02-07 NOTE — ASSESSMENT & PLAN NOTE
Lab Results   Component Value Date    HGBA1C 6.0 (H) 01/14/2024       Recent Labs     02/05/24  1759 02/06/24  0019 02/06/24  1228 02/06/24  1807   POCGLU 190* 171* 152* 194*         Blood Sugar Average: Last 72 hrs:  (P) 165.375  Continue PTA gabapentin 200 qhs

## 2024-02-07 NOTE — ASSESSMENT & PLAN NOTE
Lab Results   Component Value Date    EGFR 6 02/07/2024    EGFR 8 02/06/2024    EGFR 5 02/05/2024    CREATININE 9.00 (H) 02/07/2024    CREATININE 7.25 (H) 02/06/2024    CREATININE 10.88 (H) 02/05/2024     Recent Labs     02/05/24  0527 02/06/24  0529 02/07/24  0450   HGB 7.4* 7.9* 7.5*   HCT 23.6* 25.0* 23.8*       Lab Results   Component Value Date    GYQQNUHM75 1,427 (H) 01/07/2024     Lab Results   Component Value Date    FOLATE 10.8 01/07/2024     Per discussion with clinical pharmacy, Epo not recommended for patients with a Hgb >11.     Plan:  Trend Hgb, replete if <7  F/up iron studies  F/up outpatient with nephrology to consider restarting Epo

## 2024-02-07 NOTE — ASSESSMENT & PLAN NOTE
Lab Results   Component Value Date    HGBA1C 6.0 (H) 01/14/2024       Recent Labs     02/05/24  1759 02/06/24  0019 02/06/24  1228 02/06/24  1807   POCGLU 190* 171* 152* 194*         Blood Sugar Average: Last 72 hrs:  (P) 165.375  Patient has insulin pump, recommend fasting goal BG <140

## 2024-02-07 NOTE — ASSESSMENT & PLAN NOTE
Temp:  [98 °F (36.7 °C)-98.7 °F (37.1 °C)] 98.7 °F (37.1 °C)  HR:  [66-85] 70  Resp:  [14-16] 14  BP: (112-181)/(62-90) 161/75    POA, patient presented to the emergency department complaining of headache and was found to be hypertensive to 238/122.  He was given labetalol IV 20 mg without any improvement in his blood pressure and was started on Cardene drip, transferred to ICU for further management.  Headache resolved with improved BP.  Patient has intermittently required cardine gtt due to ongoing HTN. Has now been off cardene drip since 1700 on 2/6.  Goal SBP < 180  2/5 3.9 L off with dialysis, 2/6 3.5 L off with dialysis, 2/7 2.5 L off with dialysis    Plan:  Continue home medications hydralazine 100mg TID, labetalol 400mg TID, lisinopril 40mg qd, clonidine 0.3 mg/24hr TD weekly patch  Per nephrology recommendations, decreased nifedipine from 90mg BID to 60mg BID and added minoxidil 2.5mg BID.  F/up outpatient with nephrology for additional medication recommendations/dose changes

## 2024-02-07 NOTE — ASSESSMENT & PLAN NOTE
Lab Results   Component Value Date    EGFR 6 02/07/2024    EGFR 8 02/06/2024    EGFR 5 02/05/2024    CREATININE 9.00 (H) 02/07/2024    CREATININE 7.25 (H) 02/06/2024    CREATININE 10.88 (H) 02/05/2024     Lab Results   Component Value Date    IRON 89 02/07/2024    FERRITIN 495 (H) 02/07/2024    TIBC 162 (L) 02/07/2024    CONCFE 55 (H) 02/07/2024     Patient with known history of ESRD    Stable  Nephrology consulted to assist with hemodialysis MWF, additional dialysis sessions PRN  Restarted phoslo 2/7 due to phos increase to 5.1  Continue phoslo on discharge

## 2024-02-07 NOTE — ASSESSMENT & PLAN NOTE
Temp:  [98 °F (36.7 °C)-98.4 °F (36.9 °C)] 98.1 °F (36.7 °C)  HR:  [66-86] 72  Resp:  [14-16] 14  BP: (112-181)/(62-90) 142/73    POA, patient presented to the emergency department complaining of headache and was found to be hypertensive to 238/122.  He was given labetalol IV 20 mg without any improvement in his blood pressure and was started on Cardene drip, transferred to ICU for further management.  Headache resolved with improved BP.  Patient has intermittently required cardine gtt due to ongoing HTN  Goal SBP < 180  Continue home medications hydralazine 100mg TID, labetalol 400mg TID, lisinopril 40mg qd, clonidine 0.3 mg/24hr TD weekly patch  Per nephrology recommendations, decreased nifedipine from 90mg BID to 60mg BID and added minoxidil 2.5mg BID.  2/5 3.9L off with dialysis  2/6 second 3.5 L off via dialysis with improvement of blood pressures    Plan:  - Continue to monitor blood pressures  - Try to keep off of Cardene drip  - Nephrology consulted, appreciate recommendations

## 2024-02-08 ENCOUNTER — APPOINTMENT (OUTPATIENT)
Dept: PHYSICAL THERAPY | Facility: CLINIC | Age: 39
End: 2024-02-08
Payer: MEDICARE

## 2024-02-08 VITALS
BODY MASS INDEX: 33.68 KG/M2 | WEIGHT: 202.4 LBS | TEMPERATURE: 99 F | SYSTOLIC BLOOD PRESSURE: 133 MMHG | OXYGEN SATURATION: 99 % | RESPIRATION RATE: 11 BRPM | HEART RATE: 77 BPM | DIASTOLIC BLOOD PRESSURE: 71 MMHG

## 2024-02-08 PROBLEM — I16.0 HYPERTENSIVE URGENCY: Status: RESOLVED | Noted: 2018-02-09 | Resolved: 2024-02-08

## 2024-02-08 LAB
ANION GAP SERPL CALCULATED.3IONS-SCNC: 8 MMOL/L
ATRIAL RATE: 74 BPM
BUN SERPL-MCNC: 22 MG/DL (ref 5–25)
CA-I BLD-SCNC: 1.06 MMOL/L (ref 1.12–1.32)
CALCIUM SERPL-MCNC: 8.3 MG/DL (ref 8.4–10.2)
CHLORIDE SERPL-SCNC: 99 MMOL/L (ref 96–108)
CO2 SERPL-SCNC: 30 MMOL/L (ref 21–32)
CREAT SERPL-MCNC: 6.1 MG/DL (ref 0.6–1.3)
ERYTHROCYTE [DISTWIDTH] IN BLOOD BY AUTOMATED COUNT: 18.4 % (ref 11.6–15.1)
GFR SERPL CREATININE-BSD FRML MDRD: 10 ML/MIN/1.73SQ M
GLUCOSE SERPL-MCNC: 221 MG/DL (ref 65–140)
HCT VFR BLD AUTO: 25.4 % (ref 36.5–49.3)
HGB BLD-MCNC: 8.2 G/DL (ref 12–17)
MAGNESIUM SERPL-MCNC: 2.1 MG/DL (ref 1.9–2.7)
MCH RBC QN AUTO: 32.4 PG (ref 26.8–34.3)
MCHC RBC AUTO-ENTMCNC: 32.3 G/DL (ref 31.4–37.4)
MCV RBC AUTO: 100 FL (ref 82–98)
P AXIS: 60 DEGREES
PHOSPHATE SERPL-MCNC: 4.8 MG/DL (ref 2.7–4.5)
PLATELET # BLD AUTO: 232 THOUSANDS/UL (ref 149–390)
PMV BLD AUTO: 9.9 FL (ref 8.9–12.7)
POTASSIUM SERPL-SCNC: 4.2 MMOL/L (ref 3.5–5.3)
PR INTERVAL: 172 MS
QRS AXIS: 54 DEGREES
QRSD INTERVAL: 74 MS
QT INTERVAL: 442 MS
QTC INTERVAL: 490 MS
RBC # BLD AUTO: 2.53 MILLION/UL (ref 3.88–5.62)
SODIUM SERPL-SCNC: 137 MMOL/L (ref 135–147)
T WAVE AXIS: 5 DEGREES
VENTRICULAR RATE: 74 BPM
WBC # BLD AUTO: 4.93 THOUSAND/UL (ref 4.31–10.16)

## 2024-02-08 PROCEDURE — 80048 BASIC METABOLIC PNL TOTAL CA: CPT

## 2024-02-08 PROCEDURE — 83735 ASSAY OF MAGNESIUM: CPT

## 2024-02-08 PROCEDURE — NC001 PR NO CHARGE: Performed by: INTERNAL MEDICINE

## 2024-02-08 PROCEDURE — 84100 ASSAY OF PHOSPHORUS: CPT

## 2024-02-08 PROCEDURE — 85027 COMPLETE CBC AUTOMATED: CPT

## 2024-02-08 PROCEDURE — 82330 ASSAY OF CALCIUM: CPT

## 2024-02-08 PROCEDURE — 99232 SBSQ HOSP IP/OBS MODERATE 35: CPT | Performed by: INTERNAL MEDICINE

## 2024-02-08 RX ORDER — MINOXIDIL 2.5 MG/1
5 TABLET ORAL 2 TIMES DAILY
Qty: 120 TABLET | Refills: 0 | Status: SHIPPED | OUTPATIENT
Start: 2024-02-08

## 2024-02-08 RX ORDER — NIFEDIPINE 30 MG/1
60 TABLET, EXTENDED RELEASE ORAL 2 TIMES DAILY
Qty: 120 TABLET | Refills: 0 | Status: SHIPPED | OUTPATIENT
Start: 2024-02-08

## 2024-02-08 RX ADMIN — LISINOPRIL 40 MG: 20 TABLET ORAL at 08:05

## 2024-02-08 RX ADMIN — MINOXIDIL 5 MG: 2.5 TABLET ORAL at 08:06

## 2024-02-08 RX ADMIN — CHLORHEXIDINE GLUCONATE 15 ML: 1.2 SOLUTION ORAL at 08:05

## 2024-02-08 RX ADMIN — ESCITALOPRAM OXALATE 20 MG: 20 TABLET ORAL at 08:05

## 2024-02-08 RX ADMIN — HYDRALAZINE HYDROCHLORIDE 100 MG: 25 TABLET ORAL at 08:04

## 2024-02-08 RX ADMIN — Medication 250 MG: at 08:06

## 2024-02-08 RX ADMIN — FAMOTIDINE 40 MG: 20 TABLET, FILM COATED ORAL at 08:05

## 2024-02-08 RX ADMIN — CINACALCET 60 MG: 30 TABLET, FILM COATED ORAL at 08:06

## 2024-02-08 RX ADMIN — CALCIUM ACETATE 667 MG: 667 CAPSULE ORAL at 08:05

## 2024-02-08 RX ADMIN — ASPIRIN 81 MG: 81 TABLET, COATED ORAL at 08:06

## 2024-02-08 RX ADMIN — NIFEDIPINE 60 MG: 30 TABLET, EXTENDED RELEASE ORAL at 08:05

## 2024-02-08 RX ADMIN — LABETALOL HYDROCHLORIDE 400 MG: 200 TABLET, FILM COATED ORAL at 08:05

## 2024-02-08 NOTE — PLAN OF CARE
Problem: PAIN - ADULT  Goal: Verbalizes/displays adequate comfort level or baseline comfort level  Description: Interventions:  - Encourage patient to monitor pain and request assistance  - Assess pain using appropriate pain scale  - Administer analgesics based on type and severity of pain and evaluate response  - Implement non-pharmacological measures as appropriate and evaluate response  - Consider cultural and social influences on pain and pain management  - Notify physician/advanced practitioner if interventions unsuccessful or patient reports new pain  Outcome: Progressing     Problem: INFECTION - ADULT  Goal: Absence or prevention of progression during hospitalization  Description: INTERVENTIONS:  - Assess and monitor for signs and symptoms of infection  - Monitor lab/diagnostic results  - Monitor all insertion sites, i.e. indwelling lines, tubes, and drains  - Monitor endotracheal if appropriate and nasal secretions for changes in amount and color  - [place] appropriate cooling/warming therapies per order  - Administer medications as ordered  - Instruct and encourage patient and family to use good hand hygiene technique  - Identify and instruct in appropriate isolation precautions for identified infection/condition  Outcome: Progressing  Goal: Absence of fever/infection during neutropenic period  Description: INTERVENTIONS:  - Monitor WBC    Outcome: Progressing     Problem: SAFETY ADULT  Goal: Patient will remain free of falls  Description: INTERVENTIONS:  - Educate patient/family on patient safety including physical limitations  - Instruct patient to call for assistance with activity   - Consult OT/PT to assist with strengthening/mobility   - Keep Call bell within reach  - Keep bed low and locked with side rails adjusted as appropriate  - Keep care items and personal belongings within reach  - Initiate and maintain comfort rounds  - Make Fall Risk Sign visible to staff  - Apply yellow socks and bracelet  for high fall risk patients  - Consider moving patient to room near nurses station  Outcome: Progressing  Goal: Maintain or return to baseline ADL function  Description: INTERVENTIONS:  -  Assess patient's ability to carry out ADLs; assess patient's baseline for ADL function and identify physical deficits which impact ability to perform ADLs (bathing, care of mouth/teeth, toileting, grooming, dressing, etc.)  - Assess/evaluate cause of self-care deficits   - Assess range of motion  - Assess patient's mobility; develop plan if impaired  - Assess patient's need for assistive devices and provide as appropriate  - Encourage maximum independence but intervene and supervise when necessary  - Involve family in performance of ADLs  - Assess for home care needs following discharge   - Consider OT consult to assist with ADL evaluation and planning for discharge  - Provide patient education as appropriate  Outcome: Progressing  Goal: Maintains/Returns to pre admission functional level  Description: INTERVENTIONS:  - Perform AM-PAC 6 Click Basic Mobility/ Daily Activity assessment daily.  - Set and communicate daily mobility goal to care team and patient/family/caregiver.   - Collaborate with rehabilitation services on mobility goals if consulted  - Out of bed for toileting  - Record patient progress and toleration of activity level   Outcome: Progressing     Problem: DISCHARGE PLANNING  Goal: Discharge to home or other facility with appropriate resources  Description: INTERVENTIONS:  - Identify barriers to discharge w/patient and caregiver  - Arrange for needed discharge resources and transportation as appropriate  - Identify discharge learning needs (meds, wound care, etc.)  - Arrange for interpretive services to assist at discharge as needed  - Refer to Case Management Department for coordinating discharge planning if the patient needs post-hospital services based on physician/advanced practitioner order or complex needs  related to functional status, cognitive ability, or social support system  Outcome: Progressing     Problem: Knowledge Deficit  Goal: Patient/family/caregiver demonstrates understanding of disease process, treatment plan, medications, and discharge instructions  Description: Complete learning assessment and assess knowledge base.  Interventions:  - Provide teaching at level of understanding  - Provide teaching via preferred learning methods  Outcome: Progressing     Problem: METABOLIC, FLUID AND ELECTROLYTES - ADULT  Goal: Electrolytes maintained within normal limits  Description: INTERVENTIONS:  - Monitor labs and assess patient for signs and symptoms of electrolyte imbalances  - Administer electrolyte replacement as ordered  - Monitor response to electrolyte replacements, including repeat lab results as appropriate  - Instruct patient on fluid and nutrition as appropriate  Outcome: Progressing  Goal: Fluid balance maintained  Description: INTERVENTIONS:  - Monitor labs   - Monitor I/O and WT  - Instruct patient on fluid and nutrition as appropriate  - Assess for signs & symptoms of volume excess or deficit  Outcome: Progressing

## 2024-02-08 NOTE — ASSESSMENT & PLAN NOTE
Lab Results   Component Value Date    HGBA1C 6.0 (H) 01/14/2024       Recent Labs     02/05/24  1759 02/06/24  0019 02/06/24  1228 02/06/24  1807   POCGLU 190* 171* 152* 194*         Blood Sugar Average: Last 72 hrs:  (P) 167.6  Patient has insulin pump, recommend fasting goal BG <140

## 2024-02-08 NOTE — PROGRESS NOTES
[facility]  Progress Note  Name: Mateus Mckeon I  MRN: [id number]  Unit/Bed#: ICU 08 I Date of Admission: 2/3/2024   Date of Service: 2/8/2024 I Hospital Day: 4    Assessment/Plan   * Hypertensive urgency  Assessment & Plan  Temp:  [98 °F (36.7 °C)-98.7 °F (37.1 °C)] 98.7 °F (37.1 °C)  HR:  [66-85] 70  Resp:  [14-16] 14  BP: (112-181)/(62-90) 161/75    POA, patient presented to the emergency department complaining of headache and was found to be hypertensive to 238/122.  He was given labetalol IV 20 mg without any improvement in his blood pressure and was started on Cardene drip, transferred to ICU for further management.  Headache resolved with improved BP.  Patient has intermittently required cardine gtt due to ongoing HTN. Has now been off cardene drip since 1700 on 2/6.  Goal SBP < 180  2/5 3.9 L off with dialysis, 2/6 3.5 L off with dialysis, 2/7 2.5 L off with dialysis    Plan:  Continue home medications hydralazine 100mg TID, labetalol 400mg TID, lisinopril 40mg qd, clonidine 0.3 mg/24hr TD weekly patch  Per nephrology recommendations, decreased nifedipine from 90mg BID to 60mg BID and added minoxidil 2.5mg BID.  F/up outpatient with nephrology for additional medication recommendations/dose changes    ESRD on hemodialysis (HCC)  Assessment & Plan  Lab Results   Component Value Date    EGFR 6 02/07/2024    EGFR 8 02/06/2024    EGFR 5 02/05/2024    CREATININE 9.00 (H) 02/07/2024    CREATININE 7.25 (H) 02/06/2024    CREATININE 10.88 (H) 02/05/2024     Lab Results   Component Value Date    IRON 89 02/07/2024    FERRITIN 495 (H) 02/07/2024    TIBC 162 (L) 02/07/2024    CONCFE 55 (H) 02/07/2024     Patient with known history of ESRD    Stable  Nephrology consulted to assist with hemodialysis MWF, additional dialysis sessions PRN  Restarted phoslo 2/7 due to phos increase to 5.1  Continue phoslo on discharge    Type 1 diabetes mellitus on insulin therapy (HCC)  Assessment & Plan  Lab  Results   Component Value Date    HGBA1C 6.0 (H) 01/14/2024       Recent Labs     02/05/24  1759 02/06/24  0019 02/06/24  1228 02/06/24  1807   POCGLU 190* 171* 152* 194*         Blood Sugar Average: Last 72 hrs:  (P) 165.375  Patient has insulin pump, recommend fasting goal BG <140    Cerebrovascular accident (CVA) of left pontine structure (HCC)  Assessment & Plan  PT/OT ordered, patient with residual RUE weakness following recent CVA  Needs walker for mobility  OOB with assistance  Fall precautions    Diabetic neuropathy ([facility])  Assessment & Plan  Lab Results   Component Value Date    HGBA1C 6.0 (H) 01/14/2024       Recent Labs     02/05/24  1759 02/06/24  0019 02/06/24  1228 02/06/24  1807   POCGLU 190* 171* 152* 194*         Blood Sugar Average: Last 72 hrs:  (P) 165.375  Continue PTA gabapentin 200 qhs    Gastroesophageal reflux disease without esophagitis  Assessment & Plan  Continue PTA famotidine 40mg qd    Depression  Assessment & Plan  Continue PTA escitalopram 20mg qd    Generalized anxiety disorder  Assessment & Plan  Continue PTA escitalopram 20mg qd    Coronary artery disease involving native coronary artery of native heart without angina pectoris  Assessment & Plan  Continue PTA ASA 81mg qd    Hyperlipidemia  Assessment & Plan  Continue PTA atorvastatin 40mg qd    Essential (primary) hypertension  Assessment & Plan  Medication regimen as above for hypertensive urgency    Anemia due to chronic kidney disease, on chronic dialysis ([facility])  Assessment & Plan  Lab Results   Component Value Date    EGFR 6 02/07/2024    EGFR 8 02/06/2024    EGFR 5 02/05/2024    CREATININE 9.00 (H) 02/07/2024    CREATININE 7.25 (H) 02/06/2024    CREATININE 10.88 (H) 02/05/2024     Recent Labs     02/05/24  0527 02/06/24  0529 02/07/24  0450   HGB 7.4* 7.9* 7.5*   HCT 23.6* 25.0* 23.8*       Lab Results   Component Value Date    SSOQTPJE30 1,427 (H) 01/07/2024     Lab Results   Component Value Date    FOLATE 10.8 01/07/2024      Per discussion with clinical pharmacy, Epo not recommended for patients with a Hgb >11.     Plan:  Trend Hgb, replete if <7  F/up iron studies  F/up outpatient with nephrology to consider restarting Epo             Disposition: Stepdown Level 1    ICU Core Measures     A: Assess, Prevent, and Manage Pain Has pain been assessed? Yes  Need for changes to pain regimen? No   B: Both SAT/SAT  N/A   C: Choice of Sedation RASS Goal: 0 Alert and Calm  Need for changes to sedation or analgesia regimen? No   D: Delirium CAM-ICU: Negative   E: Early Mobility  Plan for early mobility? Yes   F: Family Engagement Plan for family engagement today? Yes         Prophylaxis:  VTE VTE covered by:  heparin (porcine), Subcutaneous, 5,000 Units at 02/07/24 0549       Stress Ulcer  covered byfamotidine (PEPCID) 40 MG tablet [[id number]] (Long-Term Med), famotidine (PEPCID) tablet 40 mg [[id number]]         Significant 24hr Events     24hr events: Over the past 24h, patient underwent his third dialysis treatment with an additional 2.5L removed during treatment.  He remained at goal with SBP <180 overnight.      Subjective   Review of Systems   Constitutional:  Negative for chills and fever.   HENT:  Negative for trouble swallowing.    Eyes:  Negative for visual disturbance.   Respiratory:  Negative for chest tightness and shortness of breath.    Cardiovascular:  Negative for chest pain, palpitations and leg swelling.   Gastrointestinal:  Positive for nausea. Negative for vomiting.   Genitourinary:  Negative for difficulty urinating and dysuria.   Neurological:  Negative for dizziness, light-headedness and headaches.   Psychiatric/Behavioral:  Negative for dysphoric mood.       Objective                            Vitals I/O      Most Recent Min/Max in 24hrs   Temp 99 °F (37.2 °C) Temp  Min: 98 °F (36.7 °C)  Max: 99.5 °F (37.5 °C)   Pulse 75 Pulse  Min: 68  Max: 84   Resp 18 Resp  Min: 12  Max: 36   BP (!) 172/83 BP  Min: 134/63  Max:  182/87   O2 Sat 100 % SpO2  Min: 97 %  Max: 100 %      Intake/Output Summary (Last 24 hours) at 2/8/2024 0647  Last data filed at 2/8/2024 0600  Gross per 24 hour   Intake 850 ml   Output 2501 ml   Net -1651 ml       Diet Renal; Lo Phosphorus; No; No; Consistent Carbohydrate Diet Level 2 (5 carb servings/75 grams CHO/meal), Sodium 2 Gm    Invasive Monitoring           Physical Exam   Physical Exam  Vitals and nursing note reviewed.   Eyes:      Conjunctiva/sclera: Conjunctivae normal.   Skin:     General: Skin is warm and dry.      Capillary Refill: Capillary refill takes less than 2 seconds.   HENT:      Head: Atraumatic.      Mouth/Throat:      Mouth: Mucous membranes are moist.   Cardiovascular:      Rate and Rhythm: Normal rate and regular rhythm.      Pulses: No decreased pulses.   Musculoskeletal:      Right lower leg: No edema.      Left lower leg: No edema.   Abdominal: General: Bowel sounds are normal. There is no distension.      Palpations: Abdomen is soft.      Tenderness: There is no abdominal tenderness. There is no guarding.   Constitutional:       General: He is not in acute distress.     Appearance: He is not ill-appearing or toxic-appearing.      Comments: Awakens to verbal   Pulmonary:      Effort: Pulmonary effort is normal. No respiratory distress.      Breath sounds: Normal breath sounds.   Neurological:      Mental Status: He is oriented to person, place and time. He is calm.      Cranial Nerves: No dysarthria or facial asymmetry.            Diagnostic Studies      EKG: NSR 70s on telemetry  Imaging: No new imaging performed in the last 24h I have personally reviewed pertinent reports.       Medications:  Scheduled PRN   aspirin, 81 mg, Daily  atorvastatin, 40 mg, QPM  b complex-vitamin C-folic acid, 1 capsule, Daily With Dinner  calcium acetate, 667 mg, TID  chlorhexidine, 15 mL, Q12H HALIE  cinacalcet, 60 mg, Daily  cloNIDine, 1 patch, Weekly  escitalopram, 20 mg, Daily  famotidine, 40 mg,  QAM  gabapentin, 200 mg, HS  heparin (porcine), 5,000 Units, Q8H HALIE  hydrALAZINE, 100 mg, TID  labetalol, 400 mg, TID  lisinopril, 40 mg, Daily  minoxidil, 5 mg, BID  NIFEdipine, 60 mg, BID  saccharomyces boulardii, 250 mg, Daily      insulin aspart, 100 Units, Daily PRN  ondansetron, 4 mg, Q6H PRN  traZODone, 25 mg, HS PRN       Continuous          Labs:    CBC    Recent Labs     02/07/24  0450 02/08/24  0607   WBC 4.42 4.93   HGB 7.5* 8.2*   HCT 23.8* 25.4*    232     BMP    Recent Labs     02/07/24  0450 02/08/24  0607   SODIUM 140 137   K 4.2 4.2    99   CO2 29 30   AGAP 10 8   BUN 34* 22   CREATININE 9.00* 6.10*   CALCIUM 8.2* 8.3*       Coags    No recent results     Additional Electrolytes  Recent Labs     02/07/24  0450 02/08/24  0607   MG 2.1 2.1   PHOS 5.1* 4.8*   CAIONIZED  --  1.06*          Blood Gas    No recent results  No recent results LFTs  No recent results    Infectious  No recent results  Glucose  Recent Labs     02/07/24  0450 02/08/24  0607   GLUC 106 221*                   Lashanda Wills, DO

## 2024-02-08 NOTE — ASSESSMENT & PLAN NOTE
Lab Results   Component Value Date    EGFR 10 02/08/2024    EGFR 6 02/07/2024    EGFR 8 02/06/2024    CREATININE 6.10 (H) 02/08/2024    CREATININE 9.00 (H) 02/07/2024    CREATININE 7.25 (H) 02/06/2024     Recent Labs     02/06/24  0529 02/07/24  0450 02/08/24  0607   HGB 7.9* 7.5* 8.2*   HCT 25.0* 23.8* 25.4*       Lab Results   Component Value Date    [id number] 1,427 (H) 01/07/2024     Lab Results   Component Value Date    FOLATE 10.8 01/07/2024     Per discussion with clinical pharmacy, Epo not recommended for patients with a Hgb >11.     Plan:  Trend Hgb, replete if <7  F/up iron studies  F/up outpatient with nephrology to consider restarting Epo

## 2024-02-08 NOTE — ASSESSMENT & PLAN NOTE
Lab Results   Component Value Date    HGBA1C 6.0 (H) 01/14/2024       Recent Labs     02/05/24  1759 02/06/24  0019 02/06/24  1228 02/06/24  1807   POCGLU 190* 171* 152* 194*         Blood Sugar Average: Last 72 hrs:  (P) 167.6  Continue PTA gabapentin 200 qhs

## 2024-02-08 NOTE — PLAN OF CARE
Problem: PAIN - ADULT  Goal: Verbalizes/displays adequate comfort level or baseline comfort level  Description: Interventions:  - Encourage patient to monitor pain and request assistance  - Assess pain using appropriate pain scale  - Administer analgesics based on type and severity of pain and evaluate response  - Implement non-pharmacological measures as appropriate and evaluate response  - Consider cultural and social influences on pain and pain management  - Notify physician/advanced practitioner if interventions unsuccessful or patient reports new pain  Outcome: Progressing     Problem: INFECTION - ADULT  Goal: Absence or prevention of progression during hospitalization  Description: INTERVENTIONS:  - Assess and monitor for signs and symptoms of infection  - Monitor lab/diagnostic results  - Monitor all insertion sites, i.e. indwelling lines, tubes, and drains  - Monitor endotracheal if appropriate and nasal secretions for changes in amount and color  - [place] appropriate cooling/warming therapies per order  - Administer medications as ordered  - Instruct and encourage patient and family to use good hand hygiene technique  - Identify and instruct in appropriate isolation precautions for identified infection/condition  Outcome: Progressing  Goal: Absence of fever/infection during neutropenic period  Description: INTERVENTIONS:  - Monitor WBC    Outcome: Progressing     Problem: SAFETY ADULT  Goal: Patient will remain free of falls  Description: INTERVENTIONS:  - Educate patient/family on patient safety including physical limitations  - Instruct patient to call for assistance with activity   - Consult OT/PT to assist with strengthening/mobility   - Keep Call bell within reach  - Keep bed low and locked with side rails adjusted as appropriate  - Keep care items and personal belongings within reach  - Initiate and maintain comfort rounds  - Make Fall Risk Sign visible to staff  - Offer Toileting every 2 Hours,  in advance of need  - Initiate/Maintain bed alarm  - Apply yellow socks and bracelet for high fall risk patients  - Consider moving patient to room near nurses station  Outcome: Progressing  Goal: Maintain or return to baseline ADL function  Description: INTERVENTIONS:  -  Assess patient's ability to carry out ADLs; assess patient's baseline for ADL function and identify physical deficits which impact ability to perform ADLs (bathing, care of mouth/teeth, toileting, grooming, dressing, etc.)  - Assess/evaluate cause of self-care deficits   - Assess range of motion  - Assess patient's mobility; develop plan if impaired  - Assess patient's need for assistive devices and provide as appropriate  - Encourage maximum independence but intervene and supervise when necessary  - Involve family in performance of ADLs  - Assess for home care needs following discharge   - Consider OT consult to assist with ADL evaluation and planning for discharge  - Provide patient education as appropriate  Outcome: Progressing  Goal: Maintains/Returns to pre admission functional level  Description: INTERVENTIONS:  - Perform AM-PAC 6 Click Basic Mobility/ Daily Activity assessment daily.  - Set and communicate daily mobility goal to care team and patient/family/caregiver.   - Collaborate with rehabilitation services on mobility goals if consulted  - Perform Range of Motion 3 times a day.  - Reposition patient every 2 hours.  - Dangle patient 3 times a day  - Stand patient 3 times a day  - Ambulate patient 3 times a day  - Out of bed to chair 3 times a day   - Out of bed for meals 3 times a day  - Out of bed for toileting  - Record patient progress and toleration of activity level   Outcome: Progressing     Problem: DISCHARGE PLANNING  Goal: Discharge to home or other facility with appropriate resources  Description: INTERVENTIONS:  - Identify barriers to discharge w/patient and caregiver  - Arrange for needed discharge resources and  transportation as appropriate  - Identify discharge learning needs (meds, wound care, etc.)  - Arrange for interpretive services to assist at discharge as needed  - Refer to Case Management Department for coordinating discharge planning if the patient needs post-hospital services based on physician/advanced practitioner order or complex needs related to functional status, cognitive ability, or social support system  Outcome: Progressing

## 2024-02-08 NOTE — ASSESSMENT & PLAN NOTE
Lab Results   Component Value Date    EGFR 10 02/08/2024    EGFR 6 02/07/2024    EGFR 8 02/06/2024    CREATININE 6.10 (H) 02/08/2024    CREATININE 9.00 (H) 02/07/2024    CREATININE 7.25 (H) 02/06/2024     Lab Results   Component Value Date    IRON 89 02/07/2024    FERRITIN 495 (H) 02/07/2024    TIBC 162 (L) 02/07/2024    CONCFE 55 (H) 02/07/2024     Patient with known history of ESRD    Stable  Nephrology consulted to assist with hemodialysis MWF, additional dialysis sessions PRN  Continue phoslo on discharge  Recommend challenging dry weight at dialysis sessions

## 2024-02-09 ENCOUNTER — TRANSITIONAL CARE MANAGEMENT (OUTPATIENT)
Dept: INTERNAL MEDICINE CLINIC | Facility: CLINIC | Age: 39
End: 2024-02-09

## 2024-02-12 NOTE — TELEPHONE ENCOUNTER
Post CVA Discharge Follow Up  Hospitalization: 1/5/24-1/23/24,1/25/24-1/27/24,1/29/24 ED visit, 2/3/24-2/8/24    Called patient with no answer. Left a voice message requesting for a call. Provided the office's phone number.      3rd attempt- mailed unable to reach letter.

## 2024-02-13 ENCOUNTER — HOSPITAL ENCOUNTER (OUTPATIENT)
Dept: MRI IMAGING | Facility: HOSPITAL | Age: 39
Discharge: HOME | End: 2024-02-13
Payer: MEDICARE

## 2024-02-13 ENCOUNTER — APPOINTMENT (OUTPATIENT)
Facility: CLINIC | Age: 39
End: 2024-02-13
Payer: MEDICARE

## 2024-02-13 DIAGNOSIS — I60.9 SUBARACHNOID HEMORRHAGE (HCC): ICD-10-CM

## 2024-02-13 DIAGNOSIS — I60.6 NONTRAUMATIC SUBARACHNOID HEMORRHAGE FROM OTHER INTRACRANIAL ARTERIES (HCC): ICD-10-CM

## 2024-02-13 PROCEDURE — G1004 CDSM NDSC: HCPCS

## 2024-02-13 PROCEDURE — 70551 MRI BRAIN STEM W/O DYE: CPT

## 2024-02-13 PROCEDURE — 70544 MR ANGIOGRAPHY HEAD W/O DYE: CPT

## 2024-02-14 ENCOUNTER — APPOINTMENT (OUTPATIENT)
Dept: PHYSICAL THERAPY | Facility: CLINIC | Age: 39
End: 2024-02-14
Payer: MEDICARE

## 2024-02-15 ENCOUNTER — EVALUATION (OUTPATIENT)
Facility: CLINIC | Age: 39
End: 2024-02-15
Payer: MEDICARE

## 2024-02-15 ENCOUNTER — APPOINTMENT (OUTPATIENT)
Dept: PHYSICAL THERAPY | Facility: CLINIC | Age: 39
End: 2024-02-15
Payer: MEDICARE

## 2024-02-15 ENCOUNTER — OFFICE VISIT (OUTPATIENT)
Dept: INTERNAL MEDICINE CLINIC | Facility: CLINIC | Age: 39
End: 2024-02-15
Payer: MEDICARE

## 2024-02-15 VITALS
HEART RATE: 74 BPM | OXYGEN SATURATION: 95 % | WEIGHT: 199.96 LBS | SYSTOLIC BLOOD PRESSURE: 126 MMHG | DIASTOLIC BLOOD PRESSURE: 70 MMHG | BODY MASS INDEX: 33.27 KG/M2 | RESPIRATION RATE: 16 BRPM | TEMPERATURE: 98.1 F

## 2024-02-15 DIAGNOSIS — I63.9 CEREBROVASCULAR ACCIDENT (CVA) OF LEFT PONTINE STRUCTURE (HCC): Primary | Chronic | ICD-10-CM

## 2024-02-15 DIAGNOSIS — Z99.2 ESRD ON HEMODIALYSIS (HCC): Chronic | ICD-10-CM

## 2024-02-15 DIAGNOSIS — I63.89 CEREBROVASCULAR ACCIDENT (CVA) DUE TO OTHER MECHANISM (HCC): Primary | ICD-10-CM

## 2024-02-15 DIAGNOSIS — E10.9 TYPE 1 DIABETES MELLITUS ON INSULIN THERAPY (HCC): Chronic | ICD-10-CM

## 2024-02-15 DIAGNOSIS — I60.9 SUBARACHNOID HEMORRHAGE (HCC): Chronic | ICD-10-CM

## 2024-02-15 DIAGNOSIS — N18.6 ESRD ON HEMODIALYSIS (HCC): Chronic | ICD-10-CM

## 2024-02-15 DIAGNOSIS — I15.0 RENOVASCULAR HYPERTENSION: ICD-10-CM

## 2024-02-15 PROCEDURE — 99496 TRANSJ CARE MGMT HIGH F2F 7D: CPT | Performed by: INTERNAL MEDICINE

## 2024-02-15 PROCEDURE — 97166 OT EVAL MOD COMPLEX 45 MIN: CPT

## 2024-02-15 NOTE — ASSESSMENT & PLAN NOTE
-R basilar cistern SAH  -s/p cerebral angiography x2  -follow up with Neurosurgery 2/28  -ASA has been resumed

## 2024-02-15 NOTE — ASSESSMENT & PLAN NOTE
-BP has been to manage but improved with med adjustments  -doing well now on clonidine 0.3mg qweekly, hydralazine 100mg TID, labetalol 400mg TID, lisinopril 40mg daily, nifedipine 60mg twice daily and minoxidil 5mg twice daily  -pt on HD  -will defer management to Nephro

## 2024-02-15 NOTE — PROGRESS NOTES
Assessment/Plan:    Problem List Items Addressed This Visit     Renovascular hypertension     -BP has been to manage but improved with med adjustments  -doing well now on clonidine 0.3mg qweekly, hydralazine 100mg TID, labetalol 400mg TID, lisinopril 40mg daily, nifedipine 60mg twice daily and minoxidil 5mg twice daily  -pt on HD  -will defer management to Nephro         ESRD on hemodialysis (HCC) (Chronic)    Type 1 diabetes mellitus on insulin therapy (HCC) (Chronic)     -DM1.  A1c well controlled  Lab Results   Component Value Date    HGBA1C 6.0 (H) 01/14/2024   -continue insulin pump         Subarachnoid hemorrhage (HCC) (Chronic)     -R basilar cistern SAH  -s/p cerebral angiography x2  -follow up with Neurosurgery 2/28  -ASA has been resumed         Cerebrovascular accident (CVA) of left pontine structure (HCC) - Primary (Chronic)     -multifocal embolic infarctions on MRI  -has RUE weakness  -scheduled for OT in [place]  -follow up with neurology   -pt on asa, statin              Subjective:      Patient ID: Mateus Mckeon is a 38 y.o. male.    HPI  TCM Call     Date and time call was made  2/9/2024  7:51 AM    Hospital care reviewed  Records reviewed    Patient was hospitialized at  [facility]     Date of Admission  02/03/24    Date of discharge  02/08/24    Diagnosis  Hypertensive urgency    Disposition  Home    Were the patients medications reviewed and updated  Yes    Current Symptoms  None      TCM Call     Post hospital issues  None    Should patient be enrolled in anticoag monitoring?  No    Scheduled for follow up?  No  left a voicemail    Patients specialists  Endocrinologist    Did you obtain your prescribed medications  Yes    Do you need help managing your prescriptions or medications  No    Is transportation to your appointment needed  No    I have advised the patient to call PCP with any new or worsening symptoms  Alisa Foley MA    Living  Arrangements  Family members    Support System  Family    The type of support provided  Emotional; Physical    Do you have social support  Yes, as much as I need    Are you recieving any outpatient services  No    Are you recieving home care services  No    Are you using any community resources  No    Current waiver services  No    Have you fallen in the last 12 months  No    Interperter language line needed  No    Counseling  Patient    Comments  Patient appointment shceduled for 4/15/21      He is accompanied by his father.    He was hospitalized at [facility] 1/5-1/23/24 due to R basilar cistern SAH s/p cerebral angiography x2.  He then developed RUE weakness with mild facial droop on 1/12 with MRI showing acute/recent infarcts in 3 vascular territories.    He was hospitalized at 1/25-1/27/24 due n/v due to HTN.  Labetalol was increased to 400mg TID.  .  He was hospitalized 2/3-2/8/24 due to HTN.  He received HD.  BP meds were adjusted, nifedipine to 60mg twice daily and minoxidil was added at 5mg twice daily.  Dry weight is not being challenged at dialysis center.    He has persistent R hand weakness.  Has R hearing loss and numbness, chronic dizziness,  He no longer has HA, n/v, abd pain.    Home avg BP around 140/80s      The following portions of the patient's history were reviewed and updated as appropriate: allergies, current medications, past family history, past medical history, past social history, past surgical history and problem list.      Current Outpatient Medications:   •  aspirin (ECOTRIN LOW STRENGTH) 81 mg EC tablet, Take 81 mg by mouth daily, Disp: , Rfl:   •  atorvastatin (LIPITOR) 40 mg tablet, Take 1 tablet (40 mg total) by mouth every evening, Disp: 90 tablet, Rfl: 3  •  b complex vitamins capsule, Take 1 capsule by mouth daily before lunch , Disp: , Rfl:   •  B-D ULTRAFINE III SHORT PEN 31G X 8 MM MISC, USE 1 PEN NEEDLE 8 TIMES DAILY, Disp: 100 each, Rfl: 47  •  calcium acetate (PHOSLO) capsule,  Take 1 capsule (667 mg total) by mouth 3 (three) times a day, Disp: 90 capsule, Rfl: 0  •  cinacalcet (SENSIPAR) 60 MG tablet, Take 1 tablet (60 mg total) by mouth daily, Disp: 30 tablet, Rfl: 0  •  cloNIDine (CATAPRES-TTS-3) 0.3 mg/24 hr, 1 patch once a week, Disp: , Rfl:   •  escitalopram (LEXAPRO) 20 mg tablet, Take 1 tablet (20 mg total) by mouth daily, Disp: 90 tablet, Rfl: 2  •  famotidine (PEPCID) 40 MG tablet, TAKE 1 TABLET BY MOUTH IN THE MORNING, Disp: 90 tablet, Rfl: 0  •  gabapentin (NEURONTIN) 100 mg capsule, TAKE 2 CAPSULES BY MOUTH AT BEDTIME, Disp: 180 capsule, Rfl: 1  •  GLUCAGON EMERGENCY 1 MG injection, , Disp: , Rfl: 3  •  Gvoke HypoPen 1-Pack 1 MG/0.2ML SOAJ, , Disp: , Rfl:   •  hydrALAZINE (APRESOLINE) 100 MG tablet, Take 1 tablet (100 mg total) by mouth 3 (three) times a day, Disp: 90 tablet, Rfl: 0  •  hydrOXYzine HCL (ATARAX) 10 mg tablet, Take 1 tablet (10 mg total) by mouth every 6 (six) hours as needed for itching or anxiety, Disp: 30 tablet, Rfl: 3  •  Insulin Disposable Pump (Omnipod 5 G6 Intro, Gen 5,) KIT, , Disp: , Rfl:   •  Insulin Disposable Pump (Omnipod 5 G6 Pod, Gen 5,) MISC, , Disp: , Rfl:   •  labetalol (NORMODYNE) 200 mg tablet, Take 2 tablets (400 mg total) by mouth 3 (three) times a day, Disp: 180 tablet, Rfl: 0  •  lisinopril (ZESTRIL) 40 mg tablet, Take 1 tablet (40 mg total) by mouth daily, Disp: 30 tablet, Rfl: 0  •  metoclopramide (REGLAN) 5 mg tablet, Take 1 tablet (5 mg total) by mouth 3 (three) times a day as needed (nausea), Disp: 90 tablet, Rfl: 0  •  minoxidil (LONITEN) 2.5 mg tablet, Take 2 tablets (5 mg total) by mouth 2 (two) times a day, Disp: 120 tablet, Rfl: 0  •  NIFEdipine (PROCARDIA XL) 30 mg 24 hr tablet, Take 2 tablets (60 mg total) by mouth 2 (two) times a day, Disp: 120 tablet, Rfl: 0  •  NovoLOG 100 UNIT/ML injection, , Disp: , Rfl:   •  ondansetron (ZOFRAN) 4 mg tablet, Take 1 tablet (4 mg total) by mouth every 8 (eight) hours as needed for nausea  or vomiting, Disp: 90 tablet, Rfl: 0  •  Patiromer Sorbitex Calcium (VELTASSA PO), Take 5 g by mouth once a week, Disp: , Rfl:   •  saccharomyces boulardii (FLORASTOR) 250 mg capsule, Take 250 mg by mouth daily, Disp: , Rfl:   •  SPS 15 GM/60ML suspension, TAKE 60 ML BY MOUTH SINGLE DOSE FOR EMERGENCY USE DURING MISSED HEMODIALYSIS, Disp: , Rfl:   •  traZODone (DESYREL) 50 mg tablet, Take 0.5 tablets (25 mg total) by mouth daily at bedtime as needed for sleep, Disp: 30 tablet, Rfl: 3  •  triamcinolone (KENALOG) 0.1 % ointment, Apply topically 2 (two) times a day For up to 14 days on the itchy areas. Avoid groin, underarms and face. It is important to take at least 1 week break between uses., Disp: 454 g, Rfl: 0    Review of Systems   Constitutional:  Positive for activity change. Negative for appetite change and fever.   HENT:  Negative for congestion, ear pain, rhinorrhea, sinus pressure, sinus pain and trouble swallowing.    Respiratory:  Negative for cough, chest tightness, shortness of breath and wheezing.    Cardiovascular:  Negative for chest pain, palpitations and leg swelling.   Gastrointestinal:  Negative for nausea and vomiting.   Musculoskeletal:  Positive for gait problem.   Neurological:  Positive for dizziness (chronic), weakness (RUE) and numbness. Negative for speech difficulty and headaches.         Objective:    /70   Pulse 74   Temp 98.1 °F (36.7 °C)   Resp 16   Wt 90.7 kg (199 lb 15.3 oz)   SpO2 95%   BMI 33.27 kg/m²      Physical Exam  Vitals reviewed.   Constitutional:       General: He is not in acute distress.  HENT:      Head: Normocephalic.      Mouth/Throat:      Mouth: Mucous membranes are moist.      Comments: Tongue midline  Cardiovascular:      Rate and Rhythm: Normal rate and regular rhythm.      Pulses: Normal pulses.      Heart sounds: Normal heart sounds.   Pulmonary:      Effort: Pulmonary effort is normal. No respiratory distress.      Breath sounds: Normal breath  sounds. No wheezing.   Musculoskeletal:      Cervical back: Neck supple.      Right lower leg: No edema.      Left lower leg: No edema.      Comments: Ambulates with a walker   Skin:     Comments: Multiple cysts on anterior chest wall   Neurological:      Mental Status: He is alert and oriented to person, place, and time.      Motor: Weakness (R hand ) present. No tremor.

## 2024-02-15 NOTE — PROGRESS NOTES
OCCUPATIONAL THERAPY INITIAL EVALUATION    Today's Date: 2/15/2024  Patient Name: Mateus Mckeon  : 1985  MRN: [id number]  Referring Provider: No ref. provider found  Dx: Cerebrovascular accident (CVA) due to other mechanism (HCC) [I63.89]      Eval/ Re-eval POC expires Auth #/ Referral # Total units  Start date  Expiration date Extension                                                        Date               Units:  Used               Authed:  Remaining                    SKILLED ANALYSIS:  Pt is a right hand dominant 38 year old male presenting to OP OT s/p CVA post angioplasty.  Pt currently requires assistance with 75% of his IADLs as he fatigues quickly and is experiencing decreased R UE strength, coordination, FMC, and dexterity.  Pt is demonstrating deficits in the following domains: FMC/dexterity, R hand function, R  strength, immediate recall, and delayed recall.  Deficits are noted based on the following assessments: ANGEL  dynamometer, Fugyl Harrison, 9 hole peg test, CMT, and clinical observation.  Recommend OP OT 2x/wk for 8-12 weeks with focus on aforementioned deficits to maximize functional recovery s/p CVA and improve QOL.  Findings and recommendations discussed with pt, and they are in agreement.    Educated pt on charges of insurance, acknowledge understanding, and are in agreement.    PLEASE COMPLETE MOCA, TM A AND B, ROM, MMT, AND MAS NEXT SESSION     PLAN OF CARE START: 24  PLAN OF CARE END: 2024  FREQUENCY: 2 times a week  PRECAUTIONS: renal failure, type 1 diabetes, HTN, SAH    Subjective    Mechanism of Injury  Bill is a 38-year-old male with history of end-stage renal disease on dialysis, hypertension, type 1 diabetes, and recent subarachnoid hemorrhage   Pt reports he went to the hospital and they found a brain bleed and reported he needed an angioplasty. Unfortunately, during the angioplasty he suffered a stroke that affected his R UE. Since his stroke he  "reports his R UE function has improved although he continues with R hand FMC/dexterity deficits.   Overall, pt requires assistance with 75% of his daily activities due to complex medical presentation as well as increased fatigue post-stroke.   \"There's nothing more like purgatory than using your non dominant side.\"    From Hospital note on 1/14/24:   38-year-old man with prior history of cerebellar and pontine strokes, prothrombin gene mutation, MTHFR gene mutation, diabetes, diabetic nephropathy with ESRD on HD, who presented with headache on 1/5 and was found to have subarachnoid hemorrhage in the basilar cisterns.  Unclear etiology.  Patient family reports nausea and retching around the time of headache.     - Underwent conventional angiogram later that day which was unremarkable for any clear source.  - MRI brain on 1/8 demonstrated multiple punctate foci of ischemia in the right MCA, bilateral cerebellar hemispheres, and right velia which may represent sequela of angiograph versus embolic strokes of unclear source (ESUS)   -Repeat angiogram on 1/12 was again unremarkable for source of subarachnoid.     Neurology consulted for right upper extremity weakness noted after second angiogram.  - Repeat MRI demonstrated numerous new embolic appearing foci of ischemia with similar suspected etiology as those mentioned above.     Transcranial Dopplers have been unremarkable with Lindegaard ratios consistently less than 3.     Spontaneous basilar cistern subarachnoid hemorrhage of yet unclear etiology.      Embolic appearing strokes, suspect complication of angiogram however cannot exclude additional embolic source.        Occupational Profile  Pt lives in a home with his parents; has one flight to bedroom and has AD throughout the house (grab bars etc.). Bill reports he needs help with preparing meals (could get by making 1 meal for himself but not all day), laundry, and driving. He is independent with dressing and " "bathing, \"but it takes it out of me. I could do some things but I need help throughout the day.\"    It is of note that the pt is actively getting dialysis; has to sit still for 4 hours  Pt reports some numbness in fingers on left side (L sided forearm is where dialysis is put in) and notes difficulty with thinking and memory since stroke. No changes in vision     He enjoys spending time with nieces and nephews and would love to get a dog.    PATIENT GOAL: \"I'd really like to get some use of my right hand.\" \"I would like to slowly get more energy.\"    HISTORY OF PRESENT ILLNESS:     PMH:   Past Medical History:   Diagnosis Date    Acute kidney injury (HCC)     Ambulates with cane     Anuria     Anxiety     Cellulitis of right elbow 03/31/2021    Chronic kidney disease     Depression     Diabetes mellitus (HCC)     Diarrhea     Emesis 10/24/2020    End stage renal disease (HCC) 02/11/2018    Formatting of this note might be different from the original. Last Assessment & Plan:  Secondary to DM.  On nightly PD.  Followed by Nephro.  [facility] Formatting of this note might be different from the original. Last Assessment & Plan:  Formatting of this note might be different from the original. 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    Esophagitis 07/21/2015    Falls     Gastroparesis     GERD (gastroesophageal reflux disease)     History of shingles 2010    History of transfusion 02/2018    no adverse reaction    Hyperlipidemia     Hyperphosphatemia     Hypertension     Hypoglycemia 07/15/2022    Itching     Mastoiditis of right side 07/15/2022    Muscle weakness     general unsteadiness    Obesity (BMI 30.0-34.9) 09/09/2019    Orthostatic hypotension 10/25/2020    Peripheral polyneuropathy 11/20/2019    PONV (postoperative nausea and vomiting) 01/26/2018    Protein-calorie malnutrition (HCC) 11/23/2020    Recurrent " peritonitis (HCC) due to peritoneal dialysis catheter 07/31/2020    Retinopathy     Seizures (HCC)     early 2020 - one time    Skin abnormality     some dime size areas where skin was scratched from itching    Spontaneous bacterial peritonitis (HCC) 10/19/2020    Squamous cell skin cancer     left temple    Stroke (HCC)     x2 - off balance/no driving/fatigue    Swelling of both lower extremities     Traumatic onycholysis 07/21/2022    Vomiting     Wears glasses        Past Surgical Hx:   Past Surgical History:   Procedure Laterality Date    CARDIAC ELECTROPHYSIOLOGY PROCEDURE N/A 9/21/2023    Procedure: Cardiac loop recorder explant;  Surgeon: Parish Morgan MD;  Location: [facility];  Service: Cardiology    CARDIAC LOOP RECORDER  05/2018    COLONOSCOPY      EGD      EYE SURGERY Right     IR AV FISTULAGRAM/GRAFTOGRAM  02/23/2021    IR CEREBRAL ANGIOGRAPHY  1/12/2024    IR CEREBRAL ANGIOGRAPHY / INTERVENTION  1/5/2024    IR TUNNELED CENTRAL LINE PLACEMENT  02/16/2021    IR TUNNELED DIALYSIS CATHETER PLACEMENT  11/18/2020    IR TUNNELED DIALYSIS CATHETER REMOVAL  02/12/2021    IR TUNNELED DIALYSIS CATHETER REMOVAL  03/11/2021    MOHS SURGERY Left 12/14/2022    Left temple with Dr. Hassan    PERITONEAL CATHETER INSERTION N/A 08/27/2018    Procedure: UNROOF PD CATHETER;  Surgeon: Felipe Lindo DO;  Location: [facility];  Service: General    AR ARTERIOVENOUS ANASTOMOSIS OPEN DIRECT Left 11/09/2020    Procedure: CREATION FISTULA  ARTERIOVENOUS (AV) - LEFT WRIST;  Surgeon: Placido Altamirano MD;  Location: [facility];  Service: Vascular    AR ESOPHAGOGASTRODUODENOSCOPY TRANSORAL DIAGNOSTIC N/A 04/18/2019    Procedure: ESOPHAGOGASTRODUODENOSCOPY (EGD);  Surgeon: Ale Figueroa MD;  Location: [facility];  Service: Gastroenterology    AR LAPS INSERTION TUNNELED INTRAPERITONEAL CATHETER N/A 08/06/2018    Procedure: LAPAROSCOPIC PD CATHETER PLACEMENT;  Surgeon: Fleipe Lindo DO;  Location: [facility];  Service:  "General    VA REMOVAL TUNNELED INTRAPERITONEAL CATHETER N/A 11/18/2020    Procedure: REMOVAL CATHETER PERITONEAL DIALYSIS;  Surgeon: Abdifatah Ty MD;  Location: [facility];  Service: General    TONSILLECTOMY      UPPER GASTROINTESTINAL ENDOSCOPY          Pain:  Location: \"In my feet from neuropathy, it's more annoying than anything.\"     Objective    Upper Extremities:  Pt is right hand dominant                ANGEL: RUE: 15lb LUE: 100lb  The age norm is approximately R: 116.8;bs and L: 112.8 lbs, indicating decreased  strength.                FUGYL JOHNS ASSESSMENT OF MOTOR RECOVERY AFTER STROKE:  R UE:  UPPER EXTREMITY SEATED: 36/36  WRIST: 10/10   HAND: 6/14  COORDINATION/SPEED: 2/6  OVERALL SCORE: 54/66     (Clinical change= 5 points, severe impairment <19, and mild impairment is >50)      NINE-HOLE PEG TEST: assesses dexterity/fine motor coordination   R hand: 3 pegs in 1 minute and 48.49 seconds   L hand: 35.14 seconds    Pt demonstrates decreased FMC compared to norms for age/sex (L: 18.62 seconds and R: 17.71 seconds)     Subluxation: NONE    Scapular winging: MINIMAL      Functional Cognition:  Highest level of education:    Contextual Memory Test:  Immediate: 3/20, 0 confabulations  Delayed: 3/20, 0 confabulations    GOALS:   Short Term Goals:  Pt will increase R UE  strength to 18 lbs to complete IADLs   Pt will increase R FMC by being able to don 5 pegs in to 9 hole peg board in 1 minute and 40 seconds on 9 hole peg to complete ADLs and IADLs   Pt will increase  R UE strength to complete 8/14 of hand section of fugyl johns for tabletop tasks  Pt will increase  R UE strength to complete 4/6 of coordination section of fugyl johns for tabletop tasks        Long Term Goals: 8-12 weeks  Pt will increase R UE  strength to 25 lbs to complete IADLs   Pt will increase R FMC by being able to don all 9 pegs in to 9 hole peg board in 3 minutes on 9 hole peg to complete ADLs and IADLs   Pt will increase  " R UE strength to complete 10/14 of hand section of fugyl benavides for tabletop tasks  Pt will increase  R UE strength to complete 5/6 of coordination section of fugyl benavides for tabletop tasks      OTHER PLANNED THERAPY INTERVENTIONS:   Supine, seated, and in stance neuro re-ed  Tricep AG  NMES/FES  FMC/prehension  Timed Trials  Manual tx  Hand to target  Sensory re-ed  Seated functional reach: crossing midline  Supine place and hold  WBearing strategies   Closed chain activities  Open chain activities  Internal and external memory aides        Objective Measurement Tracker:    IE (2/15/24) 1st Re-eval:  2nd Re-eval: 3rd Re-eval:     MoCA NT/30 /30 /30 /30 /30   TM Test A        TM Test B        CMT Immediate: 3/20, 0 confabulations  Delayed: 3/20, 0 confabulations       Nine Hole Peg Test R hand: 3 pegs in 1 minute and 48.49 seconds   L hand: 35.14 seconds         Functional Dexterity Test NOT TODAY        Strength R: 15lb,   L: 100lb       Lateral Pinch Strength NOT TODAY       2 Point Pinch Strength NOT TODAY           3 Point Pinch Strength NOT TODAY           Manual Muscle Testing             Fugl Benavides UE: 36/36  Wrist: 10/10  Hand: 6/14  Coordination: 2/6

## 2024-02-15 NOTE — ASSESSMENT & PLAN NOTE
-multifocal embolic infarctions on MRI  -has RUE weakness  -scheduled for OT in [place]  -follow up with neurology   -pt on asa, statin

## 2024-02-15 NOTE — ASSESSMENT & PLAN NOTE
-DM1.  A1c well controlled  Lab Results   Component Value Date    HGBA1C 6.0 (H) 01/14/2024   -continue insulin pump

## 2024-02-16 ENCOUNTER — APPOINTMENT (OUTPATIENT)
Dept: PHYSICAL THERAPY | Facility: CLINIC | Age: 39
End: 2024-02-16
Payer: MEDICARE

## 2024-02-16 ENCOUNTER — TELEPHONE (OUTPATIENT)
Dept: INTERNAL MEDICINE CLINIC | Facility: CLINIC | Age: 39
End: 2024-02-16

## 2024-02-16 NOTE — TELEPHONE ENCOUNTER
Palak from   Pt called to see if you would put in an order for occupational therapy for Bill post stroke. The number at the [facility] is [phone].

## 2024-02-20 ENCOUNTER — EVALUATION (OUTPATIENT)
Facility: CLINIC | Age: 39
End: 2024-02-20
Payer: MEDICARE

## 2024-02-20 ENCOUNTER — OFFICE VISIT (OUTPATIENT)
Facility: CLINIC | Age: 39
End: 2024-02-20
Payer: MEDICARE

## 2024-02-20 ENCOUNTER — APPOINTMENT (OUTPATIENT)
Dept: PHYSICAL THERAPY | Facility: CLINIC | Age: 39
End: 2024-02-20
Payer: MEDICARE

## 2024-02-20 DIAGNOSIS — I63.89 CEREBROVASCULAR ACCIDENT (CVA) DUE TO OTHER MECHANISM (HCC): Primary | ICD-10-CM

## 2024-02-20 DIAGNOSIS — I63.9 ACUTE CVA (CEREBROVASCULAR ACCIDENT) (HCC): Primary | ICD-10-CM

## 2024-02-20 PROCEDURE — 97530 THERAPEUTIC ACTIVITIES: CPT

## 2024-02-20 PROCEDURE — 97164 PT RE-EVAL EST PLAN CARE: CPT | Performed by: PHYSICAL THERAPIST

## 2024-02-20 NOTE — PROGRESS NOTES
PT Re-Evaluation          POC expires Auth Status Total   Visits  Start date  Expiration date PT/OT + Visit Limit? Co-Insurance   24                                                 Visit/Unit Tracking  AUTH Status:  Date                 Authed:  Used                Remaining                           Today's date: 2024  Patient name: Mateus Mckeon  : 1985  MRN: [id number]  Referring provider: Dania Shre DO  Dx:   Encounter Diagnosis     ICD-10-CM    1. Acute CVA (cerebrovascular accident) (HCC)  I63.9             Assessment  Assessment details: Patient is a 38 y.o. Male who presents to skilled outpatient PT with s/p CVA. He has PMH of ESRD on dialysis and has also had 2 prior CVA's. Overall LE strength is grossly 5/5 but he presents with deficits in sensation, coordination and balance. He is deemed high fall risk per ABC, 5x STS, and TUG. Will perform further balance measures at next follow-up visit. His 6MWT distance is significantly below age norm values and he does self-report staying in bed most of the time at home. His goals include improve balance, walk with SPC again, and be more active. His PLOF was modified independent with SPC. He does demonstrate a decline in outcome measures compared to values obtained at initial evaluation in January at [facility], with the exception of TUG without AD improving. Pt requires skilled PT to achieve goals below and maximize function post CVA.          Impairments: Abnormal coordination, Abnormal gait, Abnormal muscle tone, Abnormal or restricted ROM, Activity intolerance, Impaired balance, Impaired physical strength, Lacks appropriate HEP, Poor posture, Poor body mechanics, Pain with function, Safety issue, Weight-bearing intolerance, Abnormal movement, Difficulty understanding, Abnormal muscle firing  Understanding of Dx/Px/POC: Good  Prognosis: Good      Patient  verbalized understanding of POC.         Please contact me if you have any questions or recommendations. Thank you for the referral and the opportunity to share in Mateus Mckeon's care.        Plan  Plan details: PT 2-3x/week   Patient would benefit from: Skilled PT  Planned modality interventions: Biofeedback, Cryotherapy, TENS, Thermotherapy  Planned therapy interventions: Abdominal trunk stabilization, ADL training, Balance, Balance/WB training, Breathing training, Body mechanics training, Coordination, Functional ROM exercises, Gait training, HEP, Joint Mobilization, Manual Therapy, Ma taping, Motor coordination training, Neuromuscular re-education, Patient education, Postural training, Strengthening, Stretching, Therapeutic activities, Therapeutic exercises, Therapeutic training, Transfer training, Activity modification, Work reintegration  Frequency: 2-3x/wk  Duration in weeks: 12 weeks  Plan of Care beginning date: 2/20/2024  Plan of Care expiration date: 12 weeks - 5/14/2024  Treatment plan discussed with: Patient         Goals  Short Term Goals (4 weeks):    Patient will improve 6MWT by 100ft w/LRAD demonstrating significant improvements in endurance  w/in 4 weeks  Patient will be independent with HEP within 4 weeks  Patient will be able to ambulate household distances (100ft) or greater with LRAD  within 4 weeks  Patient will demonstrated improved LE strength and endurance by improved 5xSTS to 30 sec or less within 4 weeks  Patient will perform Callejas to further assess static balance and fall risk.   Patient will perform 10 meter walk test to further assess gait speed and fall risk.  Patient will improve ABC to 80% to demonstrate increased balance confidence and reduced fall risk.       Long Term Goals (12 weeks):  - Patient will be independent in a comprehensive home exercise program  - Patient will improve gait speed to 1.0 m/s to improve safety with community ambulation  - Patient will  improve MEDRANO by 6 points to facilitate return to safe independent ambulation  - Patient will improve scoring on FGA by 4 points to progress safety with dynamic tasks  - Patient will improve 6 Minute Walk Test score by 190 feet to promote improved cardiovascular endurance  - Patient will report going on walks at least 3 days per week to promote independence and improved cardiovascular endurance  - Patient will be able to ascend/descend stairs reciprocally with 1 UE assist to promote independence and safety with ADLs  - Patient will demonstrated improved LE strength and endurance by improved 5xSTS to 13 sec or less        Cut off score    All date taken from APTA Neuro Section or Rehab Measures      Medrano:  Siva et al., 2018  MDC: 2.7 pts    She et al., 2011  Cut-off score: 45/56    Chronic CVA  < 44/56 high risk for falls (Siva et al., 2018)  < 47.5/56 slow walker status (Jus rodríguez al., 2011) 5xSTS: Jean Carlos 2010  MDC: 2.3 sec  Age Norms:  60-69: 11.1 sec  70-79: 12.6 sec  80-89: 14.8 sec    Yamileth 2010, Chronic Stroke  Chronic CVA: 12 sec   TUG  Nubia rodríguez al., 2005  MDC: 2.9 sec    Cut off score:  >13.5 sec community dwelling adults  >32.2 frail elderly  <20 I for basic transfers  >30 dependent on transfers 10 Meter Walk Test:   Talia et al., 2006  Small meaningful change: 0.06 m/s  Substantial meaningful change: 0.14 m/s  MCID: 0.16 m/s    < 0.4 m/s household ambulators  0.4 - 0.8 m/s limited community ambulators  > 0.8 m/s community ambulators   FGA: Deepika et al., 2010  MCID: 4.2 pts  Geriatrics/community < 22/30 fall risk  Geriatrics/community < 20/30 unexplained falls DGI  MDC: vestibular - 4 pts  MDC: geriatric/community - 3 pts  Falls risk <19/24   6 Minute Walk Test  Talia et al., 2006  MDC: 60.98 m (200.01 ft)    Verónica Norwood, & Ally, 2012  MCID: 34.4 m    Age Norms  60-69: M - 1876 ft   F - 1765 ft  70-79: M - 1729 ft   F - 1545 ft  80-89: M - 1368 ft   F - 1286 ft Modified Joel  0: No  increase in tone  1: Catch and release or min resistance at end range  1+: Catch f/b min resistance throughout remainder (< half ROM)  2: Easily moved, but more marked tone throughout most ROM  3: Significant tone, PROM difficult  4: Rigid   MiniBest: Glenda et al., 2013  CVA < 17.5 fall risk Pass (Acute CVA)  MDC: 1.8 points (acute), 3.2 points (chronic)         Subjective    History of Present Illness  Mechanism of injury: Pt had CVA end of January (3rd stroke). He also has ESRD and goes to dialysis 3 days/week. He mostly uses rollator but at home he furniture walks. His parents need to assist him more now than prior to stroke. He likes to spend time with nieces and nephews and attend their sporting events.   Primary AD: rollator (PLOF he mostly used SPC and only used rollator for long distances like going to nephew's soccer game)   Assist level at home: lives with parents who are assisting with ADL's and IADL's, but he is transferring and walking independently.   WC usage: none  PLOF: using SPC mostly, still required some assist from parents for ADL's and IADL's   Patient goals: to walk with SPC again, work on balance, build up walking endurance     Pain  Pain in both feet due to neuropathy     Social Support  Steps to enter house: 2 NIRU  Stairs in house: full flight to 2nd floor (able to perform independently)    Lives in: 2 story home   Lives with: parents    Employment status: disabled   Hand dominance: right    Treatments  Previous treatment: PT [address]  Current treatment: PT, OT  Diagnostic Testing:       Objective     Vitals  - HR: 79 bpm  - BP: 140/70 mmHg   - SPO2: 97%    LE MMT  - R Hip Flexion: 5/5  - L Hip Flexion: 5/5  - R Hip Extension: 5/5  - L Hip Extension: 5/5  - R Hip Abduction: 5/5  - L Hip abduction: 5/5  - R Hip adduction: 5/5  - L Hip adduction: 5/5  - R Knee Extension: 5/5  - L Knee Extension: 5/5  - R Knee Flexion: 5/5  - L Knee Flexion: 5/5  - R Ankle DF: 5/5  - L Ankle DF: 5/5  - R  Ankle PF: 5/5  - L Ankle PF: 5/5    Sensation  - Light touch: neuropathy both feet up to mid calf   - Temperature: diminished     Coordination  - Alternating Toe Taps: impaired  - Heel to shin: impaired     Clonus  - L: Yes  - R: Yes  - Fatiguing: Yes  - Number of beats: 1-2 beats    Vision  - Glasses: Yes  - Abnormalities prior to CVA: Yes, retinopathy (gets shots every 3 months)  - Abnormalities post CVA: No,     Neglect  - R sided: No  - L sided: No    Gait  Wide COLBY, ataxic, decreased step length      Outcome Measures Initial Eval  1/30/24 Re-eval  2/20/24       5xSTS 31.25 sec 52.20 sec no UE's    (1 LOB)       TUG 16.3 sec with rollator    23.6 sec no AD 20.68 sec with rollator    18.44 sec no AD       10 meter  Next visit>       MEDRANO  Next visit>       FGA 12/30 Next visit>       6MWT 900 ft 710 ft       mCTSIB  FTEO firm  FTEC firm  FTEO foam  FTEC foam   Next visit>       ABC  65%            Precautions: Check BP's RUE only (fistula LUE)   Past Medical History:   Diagnosis Date    Acute kidney injury (HCC)     Ambulates with cane     Anuria     Anxiety     Cellulitis of right elbow 03/31/2021    Chronic kidney disease     Depression     Diabetes mellitus (HCC)     Diarrhea     Emesis 10/24/2020    End stage renal disease (HCC) 02/11/2018    Formatting of this note might be different from the original. Last Assessment & Plan:  Secondary to DM.  On nightly PD.  Followed by Nephro.  [facility] Formatting of this note might be different from the original. Last Assessment & Plan:  Formatting of this note might be different from the original. 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    Esophagitis 07/21/2015    Falls     Gastroparesis     GERD (gastroesophageal reflux disease)     History of shingles 2010    History of transfusion 02/2018    no adverse reaction    Hyperlipidemia     Hyperphosphatemia     Hypertension      Hypoglycemia 07/15/2022    Itching     Mastoiditis of right side 07/15/2022    Muscle weakness     general unsteadiness    Obesity (BMI 30.0-34.9) 09/09/2019    Orthostatic hypotension 10/25/2020    Peripheral polyneuropathy 11/20/2019    PONV (postoperative nausea and vomiting) 01/26/2018    Protein-calorie malnutrition (HCC) 11/23/2020    Recurrent peritonitis ([facility]) due to peritoneal dialysis catheter 07/31/2020    Retinopathy     Seizures ([facility])     early 2020 - one time    Skin abnormality     some dime size areas where skin was scratched from itching    Spontaneous bacterial peritonitis (HCC) 10/19/2020    Squamous cell skin cancer     left temple    Stroke ([facility])     x2 - off balance/no driving/fatigue    Swelling of both lower extremities     Traumatic onycholysis 07/21/2022    Vomiting     Wears glasses

## 2024-02-20 NOTE — PROGRESS NOTES
Daily Note     Today's date: 2024  Patient name: Mateus Mckeon  : 1985  MRN: [id number]  Referring provider: Dania Sher DO  Dx:   Encounter Diagnosis     ICD-10-CM    1. Cerebrovascular accident (CVA) due to other mechanism (HCC)  I63.89           Start Time: 1015  Stop Time: 1100  Total time in clinic (min): 45 minutes      PLAN OF CARE START: 24  PLAN OF CARE END: 2024  FREQUENCY: 2 times a week  PRECAUTIONS: renal failure, type 1 diabetes, HTN, SAH, seizure (last one was 2019)      Subjective: Pt reports difficulty with cognition including word finding.        Objective: See treatment diary below    Range of Motion:  AROM:   R UE   - Shoulder flexion: 75%  - Shoulder extension:   - Elbow flexion 100%   - Elbow extension: 00%  - Wrist flexion: 50%  - Wrist extension: 25%  - Pronation:  - Supination: 50%  - Finger extension:  20%  - Finger flexion: 75%    L UE : 100%       Manual Muscle Testing:  R UE:  - Shoulder flexion: 3/5  - Shoulder extension: 3/5  - Elbow flexion: 3+/5  - Elbow extension: 3+/5  - Wrist flexion: 4-/5  - Wrist extension: 4-/5  L UE:  - Shoulder flexion: 5/5  - Shoulder extension: 5/5  - Elbow flexion: 5/5  - Elbow extension: 5/5  - Wrist flexion: 5/5  - Wrist extension: 5/5    Modified Joel Scale: measures spasticity in patients with lesions in the Central Nervous System  R UE:  Elbow flexors: 1+/4  Elbow extensors: 0/4  Wrist flexors: 1+/4  Wrist extensors 1+/4  Finger flexors: 0/4  Finger extensors: 1/4    0: No increase in muscle tone  1: Slight increase in muscle tone, manifested by a catch and release or by minimal resistance at the end of the range of motion when the affected part(s) is moved in flexion or extension  1+: Slight increase in muscle tone, manifested by a catch, followed by minimal resistance throughout the remainder (less than half) of the ROM  2: More marked increase in muscle tone through most of the ROM, but affected part(s)  easily moved  3: Considerable increase in muscle tone, passive movement difficult  4: Affected part(s) rigid in flexion or extension      Functional Cognition:  Highest level of education: some college    [place] Cognitive Assessment Version 8.3 (MoCA V8.3)  Visuospatial/executive functionin/5  Naming:  3/3  Memory: 1st trial:  , 2nd trial:    Attention/concentration:   List of letters:   Serial Seven Subtraction:  3/3 w/ 0 errors  Language/sentence repetition:    Language Fluency:    Abstract/Correlational Thinkin/2  Delayed Recall:  3/5  Orientation:     Memory Index Score: 11/15  MoCA V1 8.1 Raw Score:  , MIS:  11/15, indicative of MILD neurocognitive impairments.    MoCA Scoring        Normal: 26+         Mild Cognitive Impairment: 18-25          Moderate Cognitive Impairment: 10-17         Severe Cognitive Impairment: <10    Trail making Part A and Part B:   Part A: 40.45 seconds independently    Part B: 1 minute and 47.62 with 0 VCs for recall of instructions, problem solving  Indicating deficits: Part A > 24.40 seconds and Part B > 50.68 seconds      TA post assessments:   -Pt completed stacking 1inch foam cubes however demo difficulty with thumb abduction. Vibration added with increased success. Pt demo fatigue following activity (it is of note pt had PT eval prior to OT).      Assessment: Tolerated treatment well. Patient demonstrated fatigue post treatment and would benefit from continued OT      Plan: Continue per plan of care.     SHORT TERM GOALS ADDED 24:  Pt will increase sustained attention by completing trail making part A in 35 seconds to complete IADLs  Pt will increase divided attention by completing trail making part B in 1 minute 30 seconds to complete IADLs  Pt will increase delayed recall and recall 4 /5 items on MOCA to complete IADLs    LONG TERM GOALS ADDED 24:  Pt will increase sustained attention by completing trail making part A in 38  seconds to complete IADLs  Pt will increase divided attention by completing trail making part B in 1 minute 10 seconds to complete IADLs  Pt will increase delayed recall and recall 5/5 items on MOCA to complete IADLs  Resume right  hand dominance to refined functional assist with all activities of daily living

## 2024-02-21 ENCOUNTER — TELEPHONE (OUTPATIENT)
Dept: NEUROLOGY | Facility: CLINIC | Age: 39
End: 2024-02-21

## 2024-02-21 NOTE — PSYCH
MEDICATION MANAGEMENT NOTE        [facility]      Name and Date of Birth:  Mateus Mckeon 38 y.o. 1985 MRN: [id number]    Date of Visit: February 22, 2024    Reason for Visit: Follow-up visit for medication management     Virtual Visit Disclaimer:       TeleMed provider: Pedrito Bermeo MD.   Location: [place]     Verification of patient location:     Patient is currently located in the [facility]  Patient is currently located in a state in which I am licensed     After connecting through GitHub, the patient was identified by name and date of birth.  Mateus Mckeon was informed that this is a telemedicine visit that is being conducted through [employer], and the patient was informed that this is a secure, HIPAA-compliant platform. My office door was closed. No one else was in the room. Mateus Mckeon acknowledged consent and understanding of privacy and security of the video platform. Mateus understands that the online visit is based solely on information provided by the patient, and that, in the absence of a face-to-face physical evaluation by the physician, the diagnosis Mateus  receives is both limited and provisional in terms of accuracy and completeness. Mateus Mckeon understands that they can discontinue the visit at any time. I informed Mateus that I have reviewed their record in EPIC and presented the opportunity for them to ask any questions regarding the visit today. Mateus Mckeon voiced understanding and consented to these terms. Mateus is aware this is a billable service. Mateus is present at home.      SUBJECTIVE:    Mateus Mckeon is a 38 y.o. male with past psychiatric history significant for major depressive disorder, generalized anxiety, cannabis use (medical) and insomnia who was personally seen and evaluated today at the [facility] outpatient clinic for follow-up and  "medication management. Mateus presents as dysphoric yet pleasant and cooperative. His thoughts are linear and organized. He completes assessment without difficulty.     Mateus endorses compliance with psychotropic medication regimen consisting of Lexapro and PRN Trazodone. He denies adverse medication concerns. Paulo reports a challenging 3+ months since last visit. He has required multiple medical admissions which were reviewed today. Paulo shares that her endured his \"3rd stroke\" and thus, is struggling with acclimating with some of his neurologic deficits. He is actively engaged in PT/OT, suggestive of self-preservation. Extensive supportive therapy provided. We processed the importance of \"being rigid with his goals and flexible with his methods\". He was receptive. He voiced frustration that the timeline of the recovery has been challenging and longer than expected. I provided insight that \"a delay is not a denial\". He was receptive. We discussed goals of resestablishing his sense of purpose in the world again. Acutely, his sleep is appropriate. He is using Trazodone \"twice per month\" and shares that it \"does it's job regarding sleep\". His appetite is stable. He denies SI/HI when asked today. No recent lethality concerns. His focus/concentration is disrupted secondary to physical/medical concerns. His energy and motivation are low. He is without overt hopelessness or feelings of despair. He is not experiencing crying spells at the moment. He continues to engage in dialysis, again, suggestive of self preservation. He reports anxiety and worry related to his neurologic health and CVA but denies need for medication change or intervention. No recent panic attacks. He is not tense or on-edge today. He discusses his interest in [employer] basketball and excitement for the [employer] basketball playoff system. During today's examination, Mateus does not exhibit objective evidence of aziza/hypomania or psychosis. Mateus " is not currently irritable, grandiose, labile, or pathologically euphoric. Mateus is without perceptual disturbances, such as A/V hallucinations, paranoia, ideas of reference, or delusional beliefs. Mateus denies recent ETOH or illicit substance abuse. Mateus offers no further concerns.     Current Rating Scores:     None completed today.    Review Of Systems:      Constitutional feeling tired, low energy, and as noted in HPI   ENT negative   Cardiovascular negative   Respiratory negative   Gastrointestinal negative   Genitourinary negative   Musculoskeletal Right hand weakness   Integumentary negative   Neurological muscle weakness, neuropathic pain, and numbness   Endocrine negative   Other Symptoms none, all other systems are negative     Past Psychiatric History: (unchanged information from previous note copied and italicized) - Information that is bolded has been updated.      Inpatient psychiatric admissions: Denies  Prior outpatient psychiatric linkage: Denies  Past/current psychotherapy: Currently linked with Jakob Kessler   History of suicidal attempts/gestures: Denies  History of violence/aggressive behaviors: Denies  Psychotropic medication trials: Lexapro (3 years ago), Zoloft, Trazodone (now)  Substance abuse inpatient/outpatient rehabilitation: Denies     Substance Abuse History: (unchanged information from previous note copied and italicized) - Information that is bolded has been updated.      No history of ETOH or illict substance abuse. Bill does endorse previous tobacco abuse and current medical cannabis prescription. No past legal actions or arrests secondary to substance intoxication. The patient denies prior DWIs/DUIs. No history of outpatient/inpatient rehabilitation programs. Mateus does not exhibit objective evidence of substance withdrawal during today's examination nor does Mateus appear under the influence of any psychoactive substance.          Social History: (unchanged information  from previous note copied and italicized) - Information that is bolded has been updated.      Developmental: Denies a history of milestone/developmental delay. Denies a history of in-utero exposure to toxins/illicit substances. There is no documented history of IEP or need for special education. He was born and raised in [place], [place]. He lived there for 27 years. He moved to the [facility] in 2011 to live with his sister and work as . His parents have since relocated and he now lives with them.   Education: college graduate - [employer] class of 2006 - degree in theatre   Marital history: single  Living arrangement, social support: parents - has a sister who has children (nephews and nieces)   Occupational History: on permanent disability  Access to firearms: Denies direct access to weapons/firearms. Mateus MABRY Augustokyung has no history of arrests or violence with a deadly weapon.      Traumatic History: (unchanged information from previous note copied and italicized) - Information that is bolded has been updated.     Abuse:none is reported  Other Traumatic Events: 2 CVAs - 2015 and 2018    Past Medical History:    Past Medical History:   Diagnosis Date    Acute kidney injury (HCC)     Ambulates with cane     Anuria     Anxiety     Cellulitis of right elbow 03/31/2021    Chronic kidney disease     Depression     Diabetes mellitus (HCC)     Diarrhea     Emesis 10/24/2020    End stage renal disease (HCC) 02/11/2018    Formatting of this note might be different from the original. Last Assessment & Plan:  Secondary to DM.  On nightly PD.  Followed by [facility].  [facility] Formatting of this note might be different from the original. Last Assessment & Plan:  Formatting of this note might be different from the original. 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    Esophagitis 07/21/2015    Falls     Gastroparesis     GERD  (gastroesophageal reflux disease)     History of shingles 2010    History of transfusion 02/2018    no adverse reaction    Hyperlipidemia     Hyperphosphatemia     Hypertension     Hypoglycemia 07/15/2022    Itching     Mastoiditis of right side 07/15/2022    Muscle weakness     general unsteadiness    Obesity (BMI 30.0-34.9) 09/09/2019    Orthostatic hypotension 10/25/2020    Peripheral polyneuropathy 11/20/2019    PONV (postoperative nausea and vomiting) 01/26/2018    Protein-calorie malnutrition (HCC) 11/23/2020    Recurrent peritonitis (HCC) due to peritoneal dialysis catheter 07/31/2020    Retinopathy     Seizures (HCC)     early 2020 - one time    Skin abnormality     some dime size areas where skin was scratched from itching    Spontaneous bacterial peritonitis (HCC) 10/19/2020    Squamous cell skin cancer     left temple    Stroke (HCC)     x2 - off balance/no driving/fatigue    Swelling of both lower extremities     Traumatic onycholysis 07/21/2022    Vomiting     Wears glasses         Past Surgical History:   Procedure Laterality Date    CARDIAC ELECTROPHYSIOLOGY PROCEDURE N/A 9/21/2023    Procedure: Cardiac loop recorder explant;  Surgeon: Parish Morgan MD;  Location: [facility];  Service: Cardiology    CARDIAC LOOP RECORDER  05/2018    COLONOSCOPY      EGD      EYE SURGERY Right     IR AV FISTULAGRAM/GRAFTOGRAM  02/23/2021    IR CEREBRAL ANGIOGRAPHY  1/12/2024    IR CEREBRAL ANGIOGRAPHY / INTERVENTION  1/5/2024    IR TUNNELED CENTRAL LINE PLACEMENT  02/16/2021    IR TUNNELED DIALYSIS CATHETER PLACEMENT  11/18/2020    IR TUNNELED DIALYSIS CATHETER REMOVAL  02/12/2021    IR TUNNELED DIALYSIS CATHETER REMOVAL  03/11/2021    MOHS SURGERY Left 12/14/2022    Left temple with Dr. Hassan    PERITONEAL CATHETER INSERTION N/A 08/27/2018    Procedure: UNROOF PD CATHETER;  Surgeon: Felipe Lindo DO;  Location: [facility];  Service: General    AL ARTERIOVENOUS ANASTOMOSIS OPEN DIRECT Left 11/09/2020     Procedure: CREATION FISTULA  ARTERIOVENOUS (AV) - LEFT WRIST;  Surgeon: Placido Altamirano MD;  Location: [facility];  Service: Vascular    KS ESOPHAGOGASTRODUODENOSCOPY TRANSORAL DIAGNOSTIC N/A 2019    Procedure: ESOPHAGOGASTRODUODENOSCOPY (EGD);  Surgeon: Ale Figueroa MD;  Location: [facility];  Service: Gastroenterology    KS LAPS INSERTION TUNNELED INTRAPERITONEAL CATHETER N/A 2018    Procedure: LAPAROSCOPIC PD CATHETER PLACEMENT;  Surgeon: Felipe Lindo DO;  Location: [facility];  Service: General    KS REMOVAL TUNNELED INTRAPERITONEAL CATHETER N/A 2020    Procedure: REMOVAL CATHETER PERITONEAL DIALYSIS;  Surgeon: Abdifatah Ty MD;  Location: [facility];  Service: General    TONSILLECTOMY      UPPER GASTROINTESTINAL ENDOSCOPY       Allergies   Allergen Reactions    Sulfa Antibiotics Rash       Substance Abuse History:    Social History     Substance and Sexual Activity   Alcohol Use Not Currently     Social History     Substance and Sexual Activity   Drug Use Yes    Types: Marijuana    Comment: medical marijuana       Social History:    Social History     Socioeconomic History    Marital status: Single     Spouse name: Not on file    Number of children: Not on file    Years of education: Not on file    Highest education level: Not on file   Occupational History    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   Tobacco Use    Smoking status: Former     Current packs/day: 0.00     Average packs/day: 0.5 packs/day for 12.0 years (6.0 ttl pk-yrs)     Types: Cigarettes     Start date: 2006     Quit date: 2018     Years since quittin.0    Smokeless tobacco: Never    Tobacco comments:     quit 2018   Vaping Use    Vaping status: Every Day    Substances: THC, CBD   Substance and Sexual Activity    Alcohol use: Not Currently    Drug use: Yes     Types: Marijuana     Comment: medical marijuana    Sexual activity: Not on file   Other Topics Concern    Not on file   Social History  Narrative    Caffeine use    single     Social Determinants of Health     Financial Resource Strain: Low Risk  (1/18/2023)    Overall Financial Resource Strain (CARDIA)     Difficulty of Paying Living Expenses: Not hard at all   Food Insecurity: No Food Insecurity (2/6/2024)    Hunger Vital Sign     Worried About Running Out of Food in the Last Year: Never true     Ran Out of Food in the Last Year: Never true   Transportation Needs: No Transportation Needs (2/6/2024)    PRAPARE - Transportation     Lack of Transportation (Medical): No     Lack of Transportation (Non-Medical): No   Physical Activity: Not on file   Stress: Not on file   Social Connections: Not on file   Intimate Partner Violence: Not on file   Housing Stability: Unknown (2/6/2024)    Housing Stability Vital Sign     Unable to Pay for Housing in the Last Year: No     Number of Places Lived in the Last Year: Not on file     Unstable Housing in the Last Year: No       Family Psychiatric History:     Family History   Problem Relation Age of Onset    Breast cancer Mother     Hypertension Mother     Hyperlipidemia Father     Hypertension Father     Leukemia Maternal Grandmother     Hyperlipidemia Maternal Grandfather     Hypertension Maternal Grandfather     Hyperlipidemia Paternal Grandmother     Hypertension Paternal Grandmother     Heart disease Paternal Grandfather         cardiac disorder    Diabetes Paternal Grandfather        History Review: The following portions of the patient's history were reviewed and updated as appropriate: allergies, current medications, past family history, past medical history, past social history, past surgical history, and problem list.         OBJECTIVE:     Vital signs in last 24 hours:    There were no vitals filed for this visit.    Mental Status Evaluation:    Appearance age appropriate, casually dressed, dressed appropriately, looks stated age   Behavior pleasant, cooperative, mildly anxious, good eye contact    Speech normal rate, normal volume, normal pitch   Mood dysphoric, anxious   Affect constricted   Thought Processes organized, coherent, goal directed   Associations intact associations   Thought Content no overt delusions   Perceptual Disturbances: no auditory hallucinations, no visual hallucinations   Abnormal Thoughts  Risk Potential Suicidal ideation - None at present  Homicidal ideation - None at present  Potential for aggression - No   Orientation oriented to person, place, time/date, and situation   Memory recent and remote memory grossly intact   Consciousness alert and awake   Attention Span Concentration Span attention span and concentration are age appropriate   Intellect appears to be of average intelligence   Insight intact and good   Judgement intact and good   Muscle Strength and  Gait unable to assess today due to virtual visit   Motor activity no abnormal movements   Language no difficulty naming common objects   Fund of Knowledge adequate knowledge of current events   Pain mild   Pain Scale Did not ask patient to formally rate       Laboratory Results: I have personally reviewed all pertinent laboratory/tests results    Recent Labs (last 2 months):   No results displayed because visit has over 200 results.      Admission on 01/29/2024, Discharged on 01/29/2024   Component Date Value    Ventricular Rate 01/29/2024 78     Atrial Rate 01/29/2024 78     KS Interval 01/29/2024 184     QRSD Interval 01/29/2024 72     QT Interval 01/29/2024 416     QTC Interval 01/29/2024 474     P Axis 01/29/2024 69     QRS [place] 01/29/2024 69     T Wave [place] 01/29/2024 69     POC Glucose 01/29/2024 68     WBC 01/29/2024 5.57     RBC 01/29/2024 2.62 (L)     Hemoglobin 01/29/2024 8.1 (L)     Hematocrit 01/29/2024 25.0 (L)     MCV 01/29/2024 95     MCH 01/29/2024 30.9     MCHC 01/29/2024 32.4     RDW 01/29/2024 14.4     MPV 01/29/2024 10.1     Platelets 01/29/2024 204     nRBC 01/29/2024 0     Neutrophils Relative  01/29/2024 61     Immat GRANS % 01/29/2024 0     Lymphocytes Relative 01/29/2024 23     Monocytes Relative 01/29/2024 9     Eosinophils Relative 01/29/2024 6     Basophils Relative 01/29/2024 1     Neutrophils Absolute 01/29/2024 3.41     Immature Grans Absolute 01/29/2024 0.02     Lymphocytes Absolute 01/29/2024 1.29     Monocytes Absolute 01/29/2024 0.49     Eosinophils Absolute 01/29/2024 0.31     Basophils Absolute 01/29/2024 0.05     Sodium 01/29/2024 138     Potassium 01/29/2024 4.8     Chloride 01/29/2024 97     CO2 01/29/2024 29     ANION GAP 01/29/2024 12     BUN 01/29/2024 37 (H)     Creatinine 01/29/2024 10.07 (H)     Glucose 01/29/2024 107     Calcium 01/29/2024 8.3 (L)     AST 01/29/2024 20     ALT 01/29/2024 18     Alkaline Phosphatase 01/29/2024 78     Total Protein 01/29/2024 6.2 (L)     Albumin 01/29/2024 4.0     Total Bilirubin 01/29/2024 0.55     eGFR 01/29/2024 5     hs TnI 0hr 01/29/2024 21     Lipase 01/29/2024 96 (H)     POC Glucose 01/29/2024 114     hs TnI 2hr 01/29/2024 17     Delta 2hr hsTnI 01/29/2024 -4    Admission on 01/25/2024, Discharged on 01/27/2024   Component Date Value    WBC 01/25/2024 6.38     RBC 01/25/2024 2.70 (L)     Hemoglobin 01/25/2024 8.4 (L)     Hematocrit 01/25/2024 25.9 (L)     MCV 01/25/2024 96     MCH 01/25/2024 31.1     MCHC 01/25/2024 32.4     RDW 01/25/2024 14.4     MPV 01/25/2024 9.8     Platelets 01/25/2024 234     nRBC 01/25/2024 0     Neutrophils Relative 01/25/2024 72     Immat GRANS % 01/25/2024 0     Lymphocytes Relative 01/25/2024 16     Monocytes Relative 01/25/2024 7     Eosinophils Relative 01/25/2024 4     Basophils Relative 01/25/2024 1     Neutrophils Absolute 01/25/2024 4.58     Immature Grans Absolute 01/25/2024 0.02     Lymphocytes Absolute 01/25/2024 1.02     Monocytes Absolute 01/25/2024 0.47     Eosinophils Absolute 01/25/2024 0.23     Basophils Absolute 01/25/2024 0.06     Sodium 01/25/2024 140     Potassium 01/25/2024 4.6     Chloride  01/25/2024 95 (L)     CO2 01/25/2024 35 (H)     ANION GAP 01/25/2024 10     BUN 01/25/2024 27 (H)     Creatinine 01/25/2024 8.01 (H)     Glucose 01/25/2024 163 (H)     Calcium 01/25/2024 9.0     AST 01/25/2024 28     ALT 01/25/2024 22     Alkaline Phosphatase 01/25/2024 72     Total Protein 01/25/2024 5.9 (L)     Albumin 01/25/2024 3.8     Total Bilirubin 01/25/2024 0.59     eGFR 01/25/2024 7     Lipase 01/25/2024 10 (L)     LACTIC ACID 01/25/2024 0.7     POC Glucose 01/25/2024 161 (H)     Ventricular Rate 01/25/2024 72     Atrial Rate 01/25/2024 72     HI Interval 01/25/2024 180     QRSD Interval 01/25/2024 76     QT Interval 01/25/2024 428     QTC Interval 01/25/2024 468     P Axis 01/25/2024 82     QRS Axis 01/25/2024 68     T Wave [place] 01/25/2024 53     SARS-CoV-2 01/26/2024 Negative     INFLUENZA A PCR 01/26/2024 Negative     INFLUENZA B PCR 01/26/2024 Negative     RSV PCR 01/26/2024 Negative     WBC 01/27/2024 4.76     RBC 01/27/2024 2.56 (L)     Hemoglobin 01/27/2024 7.9 (L)     Hematocrit 01/27/2024 24.5 (L)     MCV 01/27/2024 96     MCH 01/27/2024 30.9     MCHC 01/27/2024 32.2     RDW 01/27/2024 14.1     MPV 01/27/2024 10.0     Platelets 01/27/2024 209     nRBC 01/27/2024 0     Neutrophils Relative 01/27/2024 57     Immat GRANS % 01/27/2024 0     Lymphocytes Relative 01/27/2024 26     Monocytes Relative 01/27/2024 10     Eosinophils Relative 01/27/2024 5     Basophils Relative 01/27/2024 2 (H)     Neutrophils Absolute 01/27/2024 2.74     Immature Grans Absolute 01/27/2024 0.01     Lymphocytes Absolute 01/27/2024 1.24     Monocytes Absolute 01/27/2024 0.45     Eosinophils Absolute 01/27/2024 0.25     Basophils Absolute 01/27/2024 0.07     Sodium 01/27/2024 137     Potassium 01/27/2024 4.5     Chloride 01/27/2024 97     CO2 01/27/2024 33 (H)     ANION GAP 01/27/2024 7     BUN 01/27/2024 17     Creatinine 01/27/2024 6.05 (H)     Glucose 01/27/2024 128     Calcium 01/27/2024 8.5     AST 01/27/2024 25     ALT  01/27/2024 21     Alkaline Phosphatase 01/27/2024 73     Total Protein 01/27/2024 5.8 (L)     Albumin 01/27/2024 3.7     Total Bilirubin 01/27/2024 0.58     eGFR 01/27/2024 10     POC Glucose 01/27/2024 133     POC Glucose 01/27/2024 165 (H)    No results displayed because visit has over 200 results.      Admission on 01/04/2024, Discharged on 01/05/2024   Component Date Value    Ventricular Rate 01/04/2024 68     Atrial Rate 01/04/2024 68     [place] Interval 01/04/2024 174     QRSD Interval 01/04/2024 70     QT Interval 01/04/2024 456     QTC Interval 01/04/2024 484     P Axis 01/04/2024 56     QRS [place] 01/04/2024 60     T Wave [place] 01/04/2024 22     WBC 01/04/2024 8.46     RBC 01/04/2024 3.64 (L)     Hemoglobin 01/04/2024 11.5 (L)     Hematocrit 01/04/2024 34.3 (L)     MCV 01/04/2024 94     MCH 01/04/2024 31.6     MCHC 01/04/2024 33.5     RDW 01/04/2024 13.8     MPV 01/04/2024 10.0     Platelets 01/04/2024 210     nRBC 01/04/2024 0     Neutrophils Relative 01/04/2024 71     Immat GRANS % 01/04/2024 0     Lymphocytes Relative 01/04/2024 17     Monocytes Relative 01/04/2024 7     Eosinophils Relative 01/04/2024 4     Basophils Relative 01/04/2024 1     Neutrophils Absolute 01/04/2024 6.01     Immature Grans Absolute 01/04/2024 0.03     Lymphocytes Absolute 01/04/2024 1.44     Monocytes Absolute 01/04/2024 0.59     Eosinophils Absolute 01/04/2024 0.33     Basophils Absolute 01/04/2024 0.06     Sodium 01/04/2024 135     Potassium 01/04/2024 5.3     Chloride 01/04/2024 91 (L)     CO2 01/04/2024 27     ANION GAP 01/04/2024 17     BUN 01/04/2024 56 (H)     Creatinine 01/04/2024 13.49 (H)     Glucose 01/04/2024 193 (H)     Calcium 01/04/2024 9.0     AST 01/04/2024 15     ALT 01/04/2024 10     Alkaline Phosphatase 01/04/2024 79     Total Protein 01/04/2024 6.3 (L)     Albumin 01/04/2024 4.1     Total Bilirubin 01/04/2024 0.62     eGFR 01/04/2024 4     PTT 01/05/2024 30     Protime 01/05/2024 14.2     INR 01/05/2024 1.04     Admission on 01/03/2024, Discharged on 01/03/2024   Component Date Value    WBC 01/03/2024 7.16     RBC 01/03/2024 3.38 (L)     Hemoglobin 01/03/2024 10.7 (L)     Hematocrit 01/03/2024 32.1 (L)     MCV 01/03/2024 95     MCH 01/03/2024 31.7     MCHC 01/03/2024 33.3     RDW 01/03/2024 14.1     MPV 01/03/2024 10.1     Platelets 01/03/2024 215     nRBC 01/03/2024 0     Neutrophils Relative 01/03/2024 69     Immat GRANS % 01/03/2024 0     Lymphocytes Relative 01/03/2024 17     Monocytes Relative 01/03/2024 6     Eosinophils Relative 01/03/2024 7 (H)     Basophils Relative 01/03/2024 1     Neutrophils Absolute 01/03/2024 4.86     Immature Grans Absolute 01/03/2024 0.03     Lymphocytes Absolute 01/03/2024 1.21     Monocytes Absolute 01/03/2024 0.44     Eosinophils Absolute 01/03/2024 0.53     Basophils Absolute 01/03/2024 0.09     Sodium 01/03/2024 136     Potassium 01/03/2024 4.5     Chloride 01/03/2024 93 (L)     CO2 01/03/2024 34 (H)     ANION GAP 01/03/2024 9     BUN 01/03/2024 36 (H)     Creatinine 01/03/2024 9.31 (H)     Glucose 01/03/2024 131     Calcium 01/03/2024 8.4     AST 01/03/2024 15     ALT 01/03/2024 10     Alkaline Phosphatase 01/03/2024 74     Total Protein 01/03/2024 6.2 (L)     Albumin 01/03/2024 3.9     Total Bilirubin 01/03/2024 0.57     eGFR 01/03/2024 6     hs TnI 0hr 01/03/2024 25     Ventricular Rate 01/03/2024 65     Atrial Rate 01/03/2024 65     [place] Interval 01/03/2024 174     QRSD Interval 01/03/2024 78     QT Interval 01/03/2024 502     QTC Interval 01/03/2024 522     P Axis 01/03/2024 70     QRS Axis 01/03/2024 69     T Wave [place] 01/03/2024 76     SARS-CoV-2 01/03/2024 Negative     INFLUENZA A PCR 01/03/2024 Negative     INFLUENZA B PCR 01/03/2024 Negative     RSV PCR 01/03/2024 Negative     hs TnI 2hr 01/03/2024 23     Delta 2hr hsTnI 01/03/2024 -2     Ventricular Rate 01/03/2024 67     Atrial Rate 01/03/2024 67     OR Interval 01/03/2024 192     QRSD Interval 01/03/2024 76     QT Interval  01/03/2024 472     QTC Interval 01/03/2024 498     P Axis 01/03/2024 49     QRS Axis 01/03/2024 25     T Wave [place] 01/03/2024 83        Suicide/Homicide Risk Assessment:    The following interventions are recommended: contracts for safety at present - agrees to go to ED if feeling unsafe      Lethality Statement:    Based on today's assessment and clinical criteria, Mateus Mckeon contracts for safety and is not an imminent risk of harm to self or others. Outpatient level of care is deemed appropriate at this current time. Mateus understands that if they can no longer contract for safety, they need to call the office or report to their nearest Emergency Room for immediate evaluation.      Assessment/Plan:     Mateus Mckeon is a 38 y.o. male with past psychiatric history significant for major depressive disorder, generalized anxiety, cannabis use (medical) and insomnia who was personally seen and evaluated today at the [facility] outpatient clinic for follow-up and medication management. Paulo endorses an extensive medical history complicated by 2 cerebral vascular accidents in 2015 and 2018. Paulo states that the etiology of these CVAs is unknown but suspected to be secondary to unmanaged diabetes mellitus, hypertension, and history of nicotine use. Paulo states that his second CVA in 2018 was far more debilitating than the first and result in significant impairment in occupational and social functionality. He now ambulates slowly with a walking cane and is on disability. He is no longer independent and requires transportation assistance via family. He also had to move back in with his parents which has been distressing. Over the last few years, Paulo states that he was placed on the [facility] renal transplant list and was awaiting a harvested kidney. In October 2020, in the context of familial discord and recent verbal disagreement with mother, Paulo voiced vague and fleeting thoughts of  "self-harm to his dialysis nurse. She was perturbed by these statements and referred him to his PCP. While being evaluated by his PCP, Paulo was then sent to the ED where he was ultimately given psychiatric resources in the community and discharged home. Unfortunately, as a result of this incident, Paulo was removed from the transplant list for \"1 year\". This is particularly disconcerting as Paulo did not actually harm himself or engage in any self-destructive behavior. He was transparent with his treatment team and emotional raw after a disagreement with his mother. By undergoing dialysis at the exact moment he voiced these concerns to his nurse, he was seeking treatment and thus, acting in a self-preserving manner, suggestive of a will to live. Paulo is frustrated regarding being removed from the renal transplant process and is seeking transplant via [facility]. His frustration regarding this process again, is representative of future-orientation and a will to live.       Historically, Mateus denies most neurovegetative symptomatology suggestive of major depressive disorder or dysthymia. Mateus does admit to fragmented or non-restorative sleep that ultimately contributes to anergia and amotivation. He has lost his sense of connectivity and purpose as a result of his CVAs and physical limitations, however, he is engaging in therapy and now medication management to regain these. Mateus adamantly denies acute thoughts of suicide or self-harm. Paulo denies acute anxiety that is overwhelming but does repot historical symptomatology suggestive of pathologic/overwhelming anxiety. He does not experience daily or weekly panic attacks. He admits to mild nervousness and fearfulness regarding his health, which is appropriate. He does not feel on-edge, restless, or perpetually irritable. Mateus vehemently denies any acute or chronic history suggestive of an underlying affective (bipolar) organization. rachel denies " historical symptomatology suggestive of an underlying psychotic process.      Today's Plan:     Psychopharmacologically, Paulo reports overall benefit and tolerability with current regimen. He denies need for medication change or intervention, which was discussed.     DSM-V Diagnoses:      1.) Major depressive disorder (moderate, recurrent)  2.) Generalized Anxiety Disorder  3.) Medical Cannabis Use  4.) Insomnia        Treatment Recommendations/Precautions:        1.) Major depressive disorder (moderate, recurrent)  - Continue Lexapro 20mg Daily  - Seek out virtual therapy by next visit  - Psychoeducation provided regarding the importance of exercise and healthy dietary choices and their impact on mood, energy, and motivation  - Counseled to avoid ETOH, illict substances, and nicotine secondary to the detrimental effects of these substances on mental and physical health  - Encouraged to engage in non-verbal forms of therapy such as art therapy, music therapy, and mindfulness     2.) Generalized Anxiety Disorder  - Continue Lexapro 20mg Daily  - Continue PRN Hydroxyzine 10mg         3.) Medical Cannabis Use  - Counseled to avoid ETOH, illict substances, and nicotine secondary to the detrimental effects of these substances on mental and physical health  - No concerns for misuse         4.) Insomnia   - Continue Trazodone 25mg QHS PRN     Medication management every 4 months  Does not want to see a therapist despite recommendation  Aware of need to follow up with family physician for medical issues  Aware of 24 hour and weekend coverage for urgent situations accessed by calling [facility] main practice number    Medications Risks/Benefits      Risks, Benefits And Possible Side Effects Of Medications:    Risks, benefits, and possible side effects of medications explained to Mateus including risk of suicidality and serotonin syndrome related to treatment with antidepressants. He verbalizes  understanding and agreement for treatment.    Controlled Medication Discussion:     Not applicable    Psychotherapy Provided:     Individual psychotherapy provided: Yes  Counseling was provided during the session today for 16 minutes.  Medications, treatment progress and treatment plan reviewed with Mateus.  Medication education provided to Mateus.  Recent stressors discussed with Mateus including health issues, medical problems, and everyday stressors.  Coping strategies reviewed with Mateus.   Educated on importance of medication and treatment compliance.  Importance of follow up with family physician for medical issues reviewed with Mateus.  Supportive therapy provided.      Treatment Plan:    Completed and signed during the session: Yes - Treatment Plan done but not signed at time of office visit due to:  Plan reviewed by video and verbal consent given due to virtual visit.      Visit Time    Visit Start Time: 1:07 PM  Visit Stop Time: 1:30 PM  Total Visit Duration:  23 minutes     The total visit duration detailed above includes: patient engagement, medication management, psychotherapy/counseling, discussion regarding treatment goals, documentation, review of past medical records, and coordination of care.      Note Share Disclaimer:     This note was not shared with the patient due to reasonable likelihood of causing patient harm      Pedrito Bermeo MD  Board Certified Diplomate of the American Board of Psychiatry and Neurology  02/22/24

## 2024-02-21 NOTE — ADDENDUM NOTE
2024       RE: Cristiana Curran  [address]  [facility] [address]     Dear Colleague,    Thank you for referring your patient, Cristiana Curran, to the [facility] COLON AND RECTAL SURGERY CLINIC [place] [facility]. Please see a copy of my visit note below.    Colon and Rectal Surgery Clinic Note    RE: Cristiana Curran.  : 1949.  ISIDRA: 2024.    Reason for visit: hemorrhoid banding.    HPI: Cristiana Curran is a 74 year old female who presents today for rectal bleeding. She has a past medical history of heart disease, hyperparathyroidism, HLD, and pulmonary blastomycosis. CT Abdomen/Pelvis on 2024 demonstrated diverticulosis. Colonoscopy on 2024 demonstrated a non-thrombosed internal hemorrhoid, diverticulosis in the sigmoid colon, and a 4mm TA polyp in the cecum.     Today Cristiana feels that her hemorrhoids are bothering her more.  She is very active.  We had discussed banding at the time of her colonoscopy.     CT Abdomen/Pelvis (2024):  IMPRESSION:   1.  No acute findings in the abdomen and pelvis.  2.  Extensive colonic diverticulosis. Small focus of hyperdense material in the sigmoid colon has an appearance most suggestive of ingested/pill material.  3.  Mild groundglass and nodular opacities in the visualized right lung base, likely infectious or inflammatory.    Colonoscopy (2024):   Impression:                   - Non-thrombosed internal hemorrhoids found on         digital rectal exam.     - The examined portion of the ileum was normal.     - One 4 mm polyp in the cecum, removed with a cold snare. Resected and retrieved.     - Diverticulosis in the sigmoid colon and at the        hepatic flexure.      - Benign polypoid lesion in the distal rectum.           Biopsied.       - The examination was otherwise normal.           Surgical Pathology (2024):  PATHOLOGY:   A.  COLON, CECUM, POLYP:  - Tubular adenoma  - No  Addended by: Charley Uriostegui on: 4/9/2018 05:54 PM     Modules accepted: Orders high-grade dysplasia or malignancy identified  B.  RECTUM, POLYP:  - Mucosal prolapse polyp    Medical history:  Heart disease   Hyperparathyroidism   HLD   Pulmonary blastomycosis     Surgical history:  -none    Family history:  Family History   Problem Relation Age of Onset    Hypertension Mother     Osteoarthritis Mother     Heart Disease Father     Diabetes Father     Heart Disease Brother      No IBD or GI malignancy    Medications:  Current Outpatient Medications   Medication Sig Dispense Refill    albuterol (PROAIR HFA/PROVENTIL HFA/VENTOLIN HFA) 108 (90 Base) MCG/ACT inhaler Inhale 2 puffs into the lungs every 4 hours as needed for shortness of breath, wheezing or cough 18 g 1    arginine 500 MG tablet Take 1 tablet by mouth 2 times daily       atorvastatin (LIPITOR) 40 MG tablet Take 40 mg by mouth At Bedtime       Calcium Carbonate-Vitamin D (CALCIUM-VITAMIN D3 PO) Take 1 tablet by mouth daily 600 mg calcium with 500 international unit(s) vitamin D3      Cholecalciferol (VITAMIN D3 PO) Take 2,000 Units by mouth daily      cyanocobalamin (VITAMIN B-12) 1000 MCG SUBL sublingual tablet Place 1,000 mcg under the tongue daily       levothyroxine (SYNTHROID/LEVOTHROID) 75 MCG tablet TAKE 1 TABLET (75 MCG) BY MOUTH 1 TIME PER DAY **PT NEEDS TO BE SEEN BEFORE FURTHER REFILLS** 90 tablet 0    methylPREDNISolone (MEDROL DOSEPAK) 4 MG tablet therapy pack Follow Package Directions 21 tablet 0    montelukast (SINGULAIR) 10 MG tablet Take 1 tablet (10 mg) by mouth at bedtime 30 tablet 0    nitroGLYcerin (NITROSTAT) 0.4 MG sublingual tablet Place 0.4 mg under the tongue every 5 minutes as needed for chest pain (for SOB) For chest pain place 1 tablet under the tongue every 5 minutes for 3 doses. If symptoms persist 5 minutes after 1st dose call 911.      omeprazole (PRILOSEC) 40 MG DR capsule Take 1 capsule (40 mg) by mouth daily 60 capsule 11       Allergies:  Allergies   Allergen Reactions    Bacitracin Hives, Other  (See Comments) and Rash     Rash, swelling      Oxycodone Hives and Other (See Comments)     Chest and jaw pain      Sulfa Antibiotics Other (See Comments)     Rash, swelling    Other reaction(s): Other (see comments)   Rash, swelling   Rash, swelling    Cephalosporins Other (See Comments)     Not sure  PCN and Amoxicillin OK    Doxycycline Other (See Comments)     abd pain  abd pain      Dust Mite Extract      cough    Pollen Extract Cough     cough  cough      Trichophyton Cough     cough  cough      Tramadol Rash       Social history:   Social History     Tobacco Use    Smoking status: Never     Passive exposure: Never    Smokeless tobacco: Never    Tobacco comments:     over 50 years ago; very light smoker   Substance Use Topics    Alcohol use: Not Currently     Comment: rare glass of wine     Marital status: .    ROS:  A complete review of systems was performed with the patient and all systems negative except as per HPI.    Physical Examination:  Exam was chaperoned by Stephanie Chavarria RN   LMP  (LMP Unknown)   General: Well hydrated. No acute distress.    Perianal external examination:  Perianal skin: intact.  Lesions: No.  Eversion of buttocks: There was not evidence of an anal fissure. Details: N/A.  Skin tags or external hemorrhoids: Yes: mild.  Digital rectal examination: Was performed.   Sphincter tone: Fair.  Palpable lesions: No.  Other: None.  Bimanual examination: was not performed.    Anoscopy: Was performed.   Hemorrhoids: Yes.  With evidence of prolapse RAL  Lesions: No  Indication:  Time spent:    Procedures:  After discussing the risks and benefits, the patient agreed to proceed with internal hemorrhoidal banding.    Prior to the start of the procedure and with procedural staff participation, I verbally confirmed the patient s identity using two indicators, relevant allergies, that the procedure was appropriate and matched the consent or emergent situation, and that the correct  equipment/implants were available. Immediately prior to starting the procedure I conducted the Time Out with the procedural staff and re-confirmed the patient s name, procedure, and site/side. (The Joint Commission universal protocol was followed.)  Yes    Sedation (Moderate or Deep): None    A suction hemorrhoidal  was used to place a total of 1 band(s) in the right anterior position(s).    There was no significant bleeding. The patient tolerated the procedure well.    This procedure was performed under a collaborative privileging agreement with Dr. Didier Madsen, Chief Division of Colon and Rectal Surgery .    ASSESSMENT    This is a 74 year old with hemorrhoidal prolapse, s/p banding.  Risks, benefits, and alternatives of operative treatment were thoroughly discussed with the patient, he/she understands these well and agrees to proceed.    All pertinent labs and imaging were personally reviewed by me.      PLAN  - Follow up as needed.  Expectations given.  Discussed possible retreatment however based on exam this seems to be the lesion bothering her.      20 minutes spent on the date of the encounter doing chart review, history and exam, imaging review, documentation and further activities as noted above, not including the time for anoscopy and banding..    The Division of Colon and Rectal Surgery at [facility] is committed to advancing discovery and innovation in human health through research. Cristiana Curran is willing to be contacted by our research team if they qualify for any future research studies:  No      Referring Provider:  No referring provider defined for this encounter.     Primary Care Provider:  Randall Reina          Again, thank you for allowing me to participate in the care of your patient.      Sincerely,    Carolina Fournier MD

## 2024-02-22 ENCOUNTER — OFFICE VISIT (OUTPATIENT)
Dept: PODIATRY | Facility: CLINIC | Age: 39
End: 2024-02-22

## 2024-02-22 ENCOUNTER — TELEMEDICINE (OUTPATIENT)
Dept: PSYCHIATRY | Facility: CLINIC | Age: 39
End: 2024-02-22
Payer: MEDICARE

## 2024-02-22 VITALS
HEIGHT: 65 IN | HEART RATE: 76 BPM | WEIGHT: 205 LBS | RESPIRATION RATE: 18 BRPM | BODY MASS INDEX: 34.16 KG/M2 | SYSTOLIC BLOOD PRESSURE: 118 MMHG | DIASTOLIC BLOOD PRESSURE: 72 MMHG

## 2024-02-22 DIAGNOSIS — F33.0 MILD EPISODE OF RECURRENT MAJOR DEPRESSIVE DISORDER (HCC): Chronic | ICD-10-CM

## 2024-02-22 DIAGNOSIS — F41.1 GENERALIZED ANXIETY DISORDER: Primary | Chronic | ICD-10-CM

## 2024-02-22 DIAGNOSIS — B35.1 ONYCHOMYCOSIS: Primary | ICD-10-CM

## 2024-02-22 DIAGNOSIS — E10.42 DIABETIC POLYNEUROPATHY ASSOCIATED WITH TYPE 1 DIABETES MELLITUS (HCC): ICD-10-CM

## 2024-02-22 DIAGNOSIS — F51.04 PSYCHOPHYSIOLOGICAL INSOMNIA: ICD-10-CM

## 2024-02-22 LAB
METANEPH FREE SERPL-MCNC: <25 PG/ML (ref 0–88)
NORMETANEPHRINE SERPL-MCNC: 150.3 PG/ML (ref 0–210.1)

## 2024-02-22 PROCEDURE — G2211 COMPLEX E/M VISIT ADD ON: HCPCS | Performed by: STUDENT IN AN ORGANIZED HEALTH CARE EDUCATION/TRAINING PROGRAM

## 2024-02-22 PROCEDURE — 99213 OFFICE O/P EST LOW 20 MIN: CPT | Performed by: STUDENT IN AN ORGANIZED HEALTH CARE EDUCATION/TRAINING PROGRAM

## 2024-02-22 PROCEDURE — 90833 PSYTX W PT W E/M 30 MIN: CPT | Performed by: STUDENT IN AN ORGANIZED HEALTH CARE EDUCATION/TRAINING PROGRAM

## 2024-02-22 NOTE — BH TREATMENT PLAN
TREATMENT PLAN (Medication Management Only)        [facility]      Name and Date of Birth:  Mateus Mckeon 38 y.o. 1985 MRN: [id number]    Date of Visit: February 22, 2024    Diagnosis/Diagnoses:    1. Generalized anxiety disorder        2. Mild episode of recurrent major depressive disorder (HCC)        3. Psychophysiological insomnia            Strengths/Personal Resources for Self-Care: supportive family, supportive friends, taking medications as prescribed, ability to adapt to life changes, ability to communicate needs, ability to communicate well, ability to listen, ability to reason, motivation for treatment, ability to negotiate basic needs.    Area/Areas of need (in own words): anxiety symptoms, depressive symptoms  1. Long Term Goal: improve control of anxiety.  Target Date:6 months - 8/22/2024  Person/Persons responsible for completion of goal: Mateus  2.  Short Term Objective (s) - How will we reach this goal?:   A. Provider new recommended medication/dosage changes and/or continue medication(s): continue current medications as prescribed.  B. Keep regularly scheduled psychiatric appointments  C. Maintain adherence to psychotropic medication regimen   D. Continue active engagement in PT/OT  E. Maintain appropriate dietary intake  F. Practice adequate sleep hygiene      Target Date:6 months - 8/22/2024  Person/Persons Responsible for Completion of Goal: Mateus    Progress Towards Goals: continuing treatment    Treatment Modality: medication management every 3 months    Review due 180 days from date of this plan: 6 months - 8/22/2024  Expected length of service: ongoing treatment    My Physician/PA/NP and I have developed this plan together and I agree to work on the goals and objectives. I understand the treatment goals that were developed for my treatment. Treatment Plan was completed with Mateus's assistance and input throughout today's  mukul Ignacio consented to the information provided and documented above. Unfortunately, treatment plan was not signed at the time of this office visit secondary to issues related to signature keypad.

## 2024-02-22 NOTE — PROGRESS NOTES
Established patient with class findings presents for mycotic toenail care.  Vascular exam:  DP 0/4 bilateral; PT 0 4 bilateral  Derm exam:  Each toenail on right foot is thickened and yellow with subungual debris.  Remainder of nails are elongated  Diagnoses: Mycotic toenails great right foot; diabetes mellitus  Treatment:  Debrided each mycotic toenail reducing nails in thickness and removing devitalized tissue and subungual debris.  Electrical and manual debridement performed.  Trimmed remaining elongated toenails.

## 2024-02-23 ENCOUNTER — TELEPHONE (OUTPATIENT)
Dept: PSYCHIATRY | Facility: CLINIC | Age: 39
End: 2024-02-23

## 2024-02-23 ENCOUNTER — OFFICE VISIT (OUTPATIENT)
Facility: CLINIC | Age: 39
End: 2024-02-23
Payer: MEDICARE

## 2024-02-23 DIAGNOSIS — I63.89 CEREBROVASCULAR ACCIDENT (CVA) DUE TO OTHER MECHANISM (HCC): Primary | ICD-10-CM

## 2024-02-23 DIAGNOSIS — K31.84 GASTROPARESIS: ICD-10-CM

## 2024-02-23 PROCEDURE — 97112 NEUROMUSCULAR REEDUCATION: CPT

## 2024-02-23 RX ORDER — METOCLOPRAMIDE 5 MG/1
TABLET ORAL
Qty: 90 TABLET | Refills: 0 | Status: SHIPPED | OUTPATIENT
Start: 2024-02-23

## 2024-02-23 NOTE — PROGRESS NOTES
Daily Note     Today's date: 2024  Patient name: Mateus Mckeon  : 1985  MRN: [id number]  Referring provider: No ref. provider found  Dx:   Encounter Diagnosis     ICD-10-CM    1. Cerebrovascular accident (CVA) due to other mechanism (HCC)  I63.89           Start Time: 801  Stop Time: 0845  Total time in clinic (min): 44 minutes      PLAN OF CARE START: 24  PLAN OF CARE END: 2024  FREQUENCY: 2 times a week  PRECAUTIONS: renal failure, type 1 diabetes, HTN, SAH, seizure (last one was 2019)      Subjective: Pt states his R arm is weak and fatigues quickly.    Objective: See treatment diary below  -Performed weight bearing on high mat in standing with mini push-ups. 2x10 sets. Providing proprioceptive input thru the R UE to prep.   -Seated at EOM performed 2 sets of b/l GH flexion with elbows in full extension, then two sets of rows. Using 2 lb dowel. Performed until fatigue ~8 reps, with emphasis on eccentric control. Performed in front of mirror for visual feedback to improve bilateral coordination. Focus on UE strength, motor control.   -FES on wrist extensors with to improve wrist digit extension while picking up Jenga blocks off table and transfer to container on table. Performed ~50 repetitions for massed practice. Focus on grasp/release, graded control, FMC.     Assessment: Tolerated treatment well. Patient demonstrated fatigue post treatment and would benefit from continued OT      Plan: Continue per plan of care.     SHORT TERM GOALS ADDED 24:  Pt will increase sustained attention by completing trail making part A in 35 seconds to complete IADLs  Pt will increase divided attention by completing trail making part B in 1 minute 30 seconds to complete IADLs  Pt will increase delayed recall and recall 4 /5 items on MOCA to complete IADLs    LONG TERM GOALS ADDED 24:  Pt will increase sustained attention by completing trail making part A in 38 seconds to complete IADLs  Pt will  increase divided attention by completing trail making part B in 1 minute 10 seconds to complete IADLs  Pt will increase delayed recall and recall 5/5 items on MOCA to complete IADLs  Resume right  hand dominance to refined functional assist with all activities of daily living

## 2024-02-23 NOTE — PROGRESS NOTES
Mateus Mckeon  1985  [id number]  [facility]  [address]  [address]  [facility] [address] [phone]    1. Primary hypertension        2. Coronary artery disease involving native coronary artery of native heart without angina pectoris        3. Mixed hyperlipidemia        4. RACHEAL (obstructive sleep apnea)        5. Type 1 diabetes mellitus with chronic kidney disease on chronic dialysis ([facility])        6. ESRD on hemodialysis ([facility])            Summary/Discussion:  Hypertension:  - s/p hospital admission from 2/3-2/8 for hypertensive urgency requiring Cardene drip  - echo (1/5/24): LV size normal, wall thickness moderately increased, there is moderate concentric hypertrophy, LVEF 70%, LA moderately dilated, RA mildly dilated, and MV trace regurgitation  - still sub-optimally controlled with BP of 148/68 with home systolic pressures ranging from 125-180's  - he does report dizziness upon standing when his systolic pressures are <130 and states holding hydralazine occasionally since being discharged from the hospital. Instructed him to take his medications as prescribed without skipping any doses. Will continue weekly clonidine patch, labetalol 400 mg 3 times a day, lisinopril 40 mg daily, minoxidil 2.5 mg twice daily which he states was recently decreased per nephrology during last dialysis treatment, and nifedipine 60 mg twice daily   - he will continue to monitor/log daily blood pressures at home and call if blood pressures remain elevated. If they remain poorly controlled despite taking medications as prescribed, would consider making adjustments to medication regimen     Dizziness:  - as mentioned above  - see below    Orthostatic hypotension:  - orthostatic blood pressures obtained in office with positive readings of 140/82 lying, 128/78 sitting, 108/72 standing  - advised slow position changes, compression stockings, and adequate fluid intake      Nonobstructive coronary artery disease:  - RHC/LHC (7/29/2021): Without significant CAD.  Left main was not calcified but with mild arthrosclerosis, circumflex minor luminal irregularities, mid RCA 40% stenosis  - without symptoms of angina   - continue ASA and atorvastatin 40 mg daily    Dyslipidemia:  - Lipid Profile:    Latest Reference Range & Units 01/14/24 06:04   Cholesterol See Comment mg/dL 88   Triglycerides See Comment mg/dL 128   HDL >=40 mg/dL 33 (L)   LDL Calculated 0 - 100 mg/dL 29   - lipids remain at goal on current statin therapy     Obstructive sleep apnea:  - referral to sleep medicine placed at discharge (2/8)  - states prior diagnosis and unable to tolerate CPAP     Type 1 diabetes mellitus:  - hemoglobin A1c (1/14/2024): 6.0  - follows with endocrinology     ESRD on HD  - HD MWF  - follows with nephology     Interval History: Mateus Mckeon is a 38 y.o. year old male with history of nonobstructive CAD noted on prior RHC/LHC on 7/29/2021, HTN, pulmonary HTN, hyperlipidemia, ESRD on HD MWF, type I DM, recent CVA with subarachnoid hemorrhage with persistent right-sided deficits who presents to the office today for a hospital follow-up.     He presented to [facility] ED on 2/3 with complaints of headache and high blood pressures at home.  He was evaluated in the ED and had a blood pressure of 228/123.  It was noted that he has been compliant with medications.  Was given labetalol 20 mg IV without significant decrease in blood pressure and was started on Cardene drip, admitted to ICU for further management.  ICU, he intermittently required Cardene drip to keep systolic pressures less than 180.  He underwent hemodialysis 3 times with fluid challenge 3.9 L removed on 2/5, 3.5 L removed on 2/6, 2.5 L removed 22/7.  His home nifedipine was decreased from 90 mg twice daily to 60 mg twice daily.  He was also started on minoxidil, titrated to 5 mg twice daily.  His blood pressures were  monitored overnight and was discharged on 2/8 with documented /71.    Since his hospital admission he has been experiencing increased dizziness which he believes is related to recent medication changes.  States that he is more symptomatic when his systolic blood pressure readings are <130 and will occasionally skip antihypertensive medications.  He has been checking/logging daily blood pressures at home with systolic pressures ranging from 125-180s. He denies any chest pain/pressure/discomfort or shortness of breath. He denies lower extremity edema, orthopnea, and PND. He denies lightheadedness, near syncope, and syncope. He denies palpitations.      He will RTO in 1 month with Dr. Middleton or sooner if necessary. He will call with any concerns.     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   Repeat MRI demonstrates diffusion restriction in the left dorsal medial velia which could explain the above symptoms, given proximity to the paramedian pontine reticular formation (lateral gaze center-responsible for initiation of left lateral gaze), ipsilateral abducens nucleus (CN 6th, also involved in ipsilateral lateral gaze), and the ipsilateral facial nerve as it courses through the velia (correlating with subtle left facial weakness.).  - enhancement on MRI could also be post stroke related, however certainly raises the possibility of a demyelinating process as does the periventricular pattern  of T2 signal abnormalities on the FLAIR imaging, which could also be explained by his uncontrolled vascular risk factors at such a young age   - steroids completed without significant improvement symptoms.  - IV catheter placed for at least 3 plasma exchanges, which have also not been effective in improving his gaze.      Bilateral gaze palsy and left facial weakness w subtle gait ataxia initially not attributed to ischemic lesions seen on first  MRI.   Repeat MRI demonstrates diffusion restriction in the left dorsal medial velia which could explain the above symptoms, given proximity to the paramedian pontine reticular formation (lateral gaze center-responsible for initiation of left lateral gaze), ipsilateral abducens nucleus (CN 6th, also involved in ipsilateral lateral gaze), and the ipsilateral facial nerve as it courses through the velia (correlating with subtle left facial weakness.).  - enhancement on MRI could also be post stroke related, however certainly raises the possibility of a demyelinating process as does the periventricular pattern of T2 signal abnormalities on the FLAIR imaging, which could also be explained by his uncontrolled vascular risk factors at such a young age   - steroids completed without significant improvement symptoms.  - IV catheter placed for at least 3 plasma exchanges, which have also not been effective in improving his gaze.    Last Assessment & Plan:   He has been referred to neuro-ophtho         Binocular visual disturbance (Chronic)    Overview Signed 1/28/2018  4:59 PM by DEISI Lucio     Related to his gaze palsy         Hyperphosphatemia (Chronic)    Vitamin D deficiency (Chronic)    Homozygous MTHFR mutation [id number] (Chronic)    Overview Signed 2/5/2018  1:42 PM by DEISI Lucio     Started on Folic acid at this time         Anemia in chronic kidney disease, on chronic dialysis (HCC)    Overview Signed 1/5/2024 11:34 AM by Tyler Anand DO     Formatting of this  note might be different from the original.   Last Assessment & Plan:    -received Epo 1/3   -goal Hb>7 before transfusing   -looks clinically well   -follow up with Nephro      Last Assessment & Plan:    Formatting of this note might be different from the original.   -received Epo 1/3   -goal Hb>7 before transfusing   -looks clinically well   -follow up with Nephro         Lab Results   Component Value Date    EGFR 10 02/08/2024    EGFR 6 02/07/2024    EGFR 8 02/06/2024    CREATININE 6.10 (H) 02/08/2024    CREATININE 9.00 (H) 02/07/2024    CREATININE 7.25 (H) 02/06/2024         Persistent proteinuria (Chronic)    Ataxia    Left atrial dilation    Renovascular hypertension    Overview Signed 1/5/2024 11:34 AM by Tyler Anand,      Formatting of this note might be different from the original.   Last Assessment & Plan:    BP meds adjusted due to dehydration.  BP have been elevated since.  Patient is scheduled for follow up with Nephro tomorrow and will defer adjustments to them      Last Assessment & Plan:    Formatting of this note is different from the original.   BP Readings from Last 3 Encounters:    02/14/23 152/79    08/09/22 118/68    02/15/22 132/74       Elevated today in office. Follows with nephrology. Going to HD today. Continue.  Formatting of this note might be different from the original.   Last Assessment & Plan:    -better controlled with recent adjustment of labetalol to 400mg bid.  Patient also on cardura 2mg daily, clonidine 0.3mg patch, hydralazine 50mg tid, lisinopril 20mg bid, nifedipine 60mg bid, torsemide 100mg bid and metolazone 10mg daily   -follow up with Nphrology   Formatting of this note might be different from the original.   Last Assessment & Plan:    BP meds adjusted due to dehydration.  BP have been elevated since.  Patient is scheduled for follow up with Nephro tomorrow and will defer adjustments to them      Last Assessment & Plan:    Formatting of this note might be different  from the original.   -better controlled with recent adjustment of labetalol to 400mg bid.  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)    Dyslipidemia    Strabismus    Environmental and seasonal allergies (Chronic)    Gastroparesis diabeticorum  (Chronic)      Lab Results   Component Value Date    HGBA1C 6.0 (H) 01/14/2024         Multiple pulmonary nodules    Overview Signed 12/9/2019  3:05 PM by Corazon Mandujano PA-C     5 mm R middle lobe lung nodule, new          Vertigo    Impaired mobility and ADLs    Diabetic neuropathy (HCC) (Chronic)      Lab Results   Component Value Date    HGBA1C 6.0 (H) 01/14/2024         Provoked seizure (HCC)    Asterixis    Foot drop, bilateral    Secondary hyperparathyroidism (HCC)    Overview Signed 1/5/2024 11:34 AM by Tyler Anand,      Formatting of this note might be different from the original.   Last Assessment & Plan:    · Patient with CKD, hyperphosphatemia, PTH  158.2      Plan      Continue follow up with pcp and nephrology      Last Assessment & Plan:    Formatting of this note might be different from the original.   · Patient with CKD, hyperphosphatemia, PTH  158.2      Plan      Continue follow up with pcp and nephrology         ESRD on hemodialysis (HCC) (Chronic)    Overview Deleted 2/4/2024  1:14 AM by DEISI Galdamez            Lab Results   Component Value Date    EGFR 10 02/08/2024    EGFR 6 02/07/2024    EGFR 8 02/06/2024    CREATININE 6.10 (H) 02/08/2024    CREATININE 9.00 (H) 02/07/2024    CREATININE 7.25 (H) 02/06/2024         Tubulovillous adenoma of colon    Gastroesophageal reflux disease without esophagitis (Chronic)    Overview Deleted 2/4/2024  1:17 AM by DEISI Galdamez            Depression (Chronic)    Medical cannabis  use    Mild episode of recurrent major depressive disorder (HCC)    Generalized anxiety disorder (Chronic)    Hypothyroidism (Chronic)    Coronary artery disease involving native coronary artery of native heart without angina pectoris (Chronic)    Status post placement of implantable loop recorder    RACHEAL (obstructive sleep apnea) (Chronic)    Type 1 diabetes mellitus on insulin therapy (HCC) (Chronic)      Lab Results   Component Value Date    HGBA1C 6.0 (H) 01/14/2024         Hyperlipidemia (Chronic)    Insomnia    Subarachnoid hemorrhage (HCC) (Chronic)    Cerebellar stroke syndrome (Chronic)    Anemia in chronic kidney disease    Lab Results   Component Value Date    EGFR 10 02/08/2024    EGFR 6 02/07/2024    EGFR 8 02/06/2024    CREATININE 6.10 (H) 02/08/2024    CREATININE 9.00 (H) 02/07/2024    CREATININE 7.25 (H) 02/06/2024         Essential (primary) hypertension (Chronic)    Anemia due to chronic kidney disease, on chronic dialysis (HCC) (Chronic)    Lab Results   Component Value Date    EGFR 10 02/08/2024    EGFR 6 02/07/2024    EGFR 8 02/06/2024    CREATININE 6.10 (H) 02/08/2024    CREATININE 9.00 (H) 02/07/2024    CREATININE 7.25 (H) 02/06/2024         Primary hypertension    Cerebrovascular accident (CVA) of left pontine structure (HCC) (Chronic)    Overview Deleted 2/4/2024  1:18 AM by DEISI Galdamez            Obesity (BMI 30.0-34.9) (Chronic)    Overview Deleted 2/4/2024  1:10 AM by DEISI Galdamez                Past Medical History:   Diagnosis Date    Acute kidney injury (HCC)     Ambulates with cane     Anuria     Anxiety     Cellulitis of right elbow 03/31/2021    Chronic kidney disease     Depression     Diabetes mellitus (HCC)     Diarrhea     Emesis 10/24/2020    End stage renal disease (HCC) 02/11/2018    Formatting of this note might be different from the original. Last Assessment & Plan:  Secondary to DM.  On nightly PD.  Followed by Nephro.  Patient considering  transplant for kidney and pancreas through [facility] Formatting of this note might be different from the original. Last Assessment & Plan:  Formatting of this note might be different from the original. Lab Results  Component Value Date   EGFR     Eosinophilic leukocytosis 2020    Esophagitis 2015    Falls     Gastroparesis     GERD (gastroesophageal reflux disease)     History of shingles 2010    History of transfusion 2018    no adverse reaction    Hyperlipidemia     Hyperphosphatemia     Hypertension     Hypoglycemia 07/15/2022    Itching     Mastoiditis of right side 07/15/2022    Muscle weakness     general unsteadiness    Obesity (BMI 30.0-34.9) 2019    Orthostatic hypotension 10/25/2020    Peripheral polyneuropathy 2019    PONV (postoperative nausea and vomiting) 2018    Protein-calorie malnutrition (HCC) 2020    Recurrent peritonitis (HCC) due to peritoneal dialysis catheter 2020    Retinopathy     Seizures (HCC)     early  - one time    Skin abnormality     some dime size areas where skin was scratched from itching    Spontaneous bacterial peritonitis (HCC) 10/19/2020    Squamous cell skin cancer     left temple    Stroke (HCC)     x2 - off balance/no driving/fatigue    Swelling of both lower extremities     Traumatic onycholysis 2022    Vomiting     Wears glasses      Social History     Socioeconomic History    Marital status: Single     Spouse name: Not on file    Number of children: Not on file    Years of education: Not on file    Highest education level: Not on file   Occupational History    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   Tobacco Use    Smoking status: Former     Current packs/day: 0.00     Average packs/day: 0.5 packs/day for 12.0 years (6.0 ttl pk-yrs)     Types: Cigarettes     Start date: 2006     Quit date: 2018     Years since quittin.0    Smokeless tobacco: Never    Tobacco comments:     quit 2018   Vaping  Use    Vaping status: Every Day    Substances: THC, CBD   Substance and Sexual Activity    Alcohol use: Not Currently    Drug use: Yes     Types: Marijuana     Comment: medical marijuana    Sexual activity: Not on file   Other Topics Concern    Not on file   Social History Narrative    Caffeine use    single     Social Determinants of Health     Financial Resource Strain: Low Risk  (1/18/2023)    Overall Financial Resource Strain (CARDIA)     Difficulty of Paying Living Expenses: Not hard at all   Food Insecurity: No Food Insecurity (2/6/2024)    Hunger Vital Sign     Worried About Running Out of Food in the Last Year: Never true     Ran Out of Food in the Last Year: Never true   Transportation Needs: No Transportation Needs (2/6/2024)    PRAPARE - Transportation     Lack of Transportation (Medical): No     Lack of Transportation (Non-Medical): No   Physical Activity: Not on file   Stress: Not on file   Social Connections: Not on file   Intimate Partner Violence: Not on file   Housing Stability: Unknown (2/6/2024)    Housing Stability Vital Sign     Unable to Pay for Housing in the Last Year: No     Number of Places Lived in the Last Year: Not on file     Unstable Housing in the Last Year: No      Family History   Problem Relation Age of Onset    Breast cancer Mother     Hypertension Mother     Hyperlipidemia Father     Hypertension Father     Leukemia Maternal Grandmother     Hyperlipidemia Maternal Grandfather     Hypertension Maternal Grandfather     Hyperlipidemia Paternal Grandmother     Hypertension Paternal Grandmother     Heart disease Paternal Grandfather         cardiac disorder    Diabetes Paternal Grandfather      Past Surgical History:   Procedure Laterality Date    CARDIAC ELECTROPHYSIOLOGY PROCEDURE N/A 9/21/2023    Procedure: Cardiac loop recorder explant;  Surgeon: Parish Morgan MD;  Location: [facility];  Service: Cardiology    CARDIAC LOOP RECORDER  05/2018    COLONOSCOPY      EGD       EYE SURGERY Right     IR AV FISTULAGRAM/GRAFTOGRAM  02/23/2021    IR CEREBRAL ANGIOGRAPHY  1/12/2024    IR CEREBRAL ANGIOGRAPHY / INTERVENTION  1/5/2024    IR TUNNELED CENTRAL LINE PLACEMENT  02/16/2021    IR TUNNELED DIALYSIS CATHETER PLACEMENT  11/18/2020    IR TUNNELED DIALYSIS CATHETER REMOVAL  02/12/2021    IR TUNNELED DIALYSIS CATHETER REMOVAL  03/11/2021    MOHS SURGERY Left 12/14/2022    Left temple with Dr. Hassan    PERITONEAL CATHETER INSERTION N/A 08/27/2018    Procedure: UNROOF PD CATHETER;  Surgeon: Felipe Lindo DO;  Location: [facility];  Service: General    OH ARTERIOVENOUS ANASTOMOSIS OPEN DIRECT Left 11/09/2020    Procedure: CREATION FISTULA  ARTERIOVENOUS (AV) - LEFT WRIST;  Surgeon: Placido Altamirano MD;  Location: [facility];  Service: Vascular    OH ESOPHAGOGASTRODUODENOSCOPY TRANSORAL DIAGNOSTIC N/A 04/18/2019    Procedure: ESOPHAGOGASTRODUODENOSCOPY (EGD);  Surgeon: Ale Figueroa MD;  Location: [facility];  Service: Gastroenterology    OH LAPS INSERTION TUNNELED INTRAPERITONEAL CATHETER N/A 08/06/2018    Procedure: LAPAROSCOPIC PD CATHETER PLACEMENT;  Surgeon: Felipe Lindo DO;  Location: [facility];  Service: General    OH REMOVAL TUNNELED INTRAPERITONEAL CATHETER N/A 11/18/2020    Procedure: REMOVAL CATHETER PERITONEAL DIALYSIS;  Surgeon: Abdifatah Ty MD;  Location: [facility];  Service: General    TONSILLECTOMY      UPPER GASTROINTESTINAL ENDOSCOPY         Current Outpatient Medications:     aspirin (ECOTRIN LOW STRENGTH) 81 mg EC tablet, Take 81 mg by mouth daily, Disp: , Rfl:     atorvastatin (LIPITOR) 40 mg tablet, Take 1 tablet (40 mg total) by mouth every evening, Disp: 90 tablet, Rfl: 3    b complex vitamins capsule, Take 1 capsule by mouth daily before lunch , Disp: , Rfl:     calcium acetate (PHOSLO) capsule, Take 1 capsule (667 mg total) by mouth 3 (three) times a day, Disp: 90 capsule, Rfl: 0    cinacalcet (SENSIPAR) 60 MG tablet, Take 1 tablet (60 mg total) by mouth daily,  Disp: 30 tablet, Rfl: 0    cloNIDine (CATAPRES-TTS-3) 0.3 mg/24 hr, 1 patch once a week, Disp: , Rfl:     escitalopram (LEXAPRO) 20 mg tablet, Take 1 tablet (20 mg total) by mouth daily, Disp: 90 tablet, Rfl: 2    famotidine (PEPCID) 40 MG tablet, TAKE 1 TABLET BY MOUTH IN THE MORNING, Disp: 90 tablet, Rfl: 0    gabapentin (NEURONTIN) 100 mg capsule, TAKE 2 CAPSULES BY MOUTH AT BEDTIME, Disp: 180 capsule, Rfl: 1    GLUCAGON EMERGENCY 1 MG injection, , Disp: , Rfl: 3    Gvoke HypoPen 1-Pack 1 MG/0.2ML SOAJ, , Disp: , Rfl:     hydrALAZINE (APRESOLINE) 100 MG tablet, Take 1 tablet (100 mg total) by mouth 3 (three) times a day, Disp: 90 tablet, Rfl: 0    hydrOXYzine HCL (ATARAX) 10 mg tablet, Take 1 tablet (10 mg total) by mouth every 6 (six) hours as needed for itching or anxiety, Disp: 30 tablet, Rfl: 3    Insulin Disposable Pump (Omnipod 5 G6 Intro, Gen 5,) KIT, , Disp: , Rfl:     Insulin Disposable Pump (Omnipod 5 G6 Pod, Gen 5,) MISC, , Disp: , Rfl:     labetalol (NORMODYNE) 200 mg tablet, Take 2 tablets (400 mg total) by mouth 3 (three) times a day, Disp: 180 tablet, Rfl: 0    lisinopril (ZESTRIL) 40 mg tablet, Take 1 tablet (40 mg total) by mouth daily, Disp: 30 tablet, Rfl: 0    minoxidil (LONITEN) 2.5 mg tablet, Take 2 tablets (5 mg total) by mouth 2 (two) times a day, Disp: 120 tablet, Rfl: 0    NIFEdipine (PROCARDIA XL) 30 mg 24 hr tablet, Take 2 tablets (60 mg total) by mouth 2 (two) times a day, Disp: 120 tablet, Rfl: 0    NovoLOG 100 UNIT/ML injection, , Disp: , Rfl:     ondansetron (ZOFRAN) 4 mg tablet, Take 1 tablet (4 mg total) by mouth every 8 (eight) hours as needed for nausea or vomiting, Disp: 90 tablet, Rfl: 0    Patiromer Sorbitex Calcium (VELTASSA PO), Take 5 g by mouth once a week, Disp: , Rfl:     saccharomyces boulardii (FLORASTOR) 250 mg capsule, Take 250 mg by mouth daily, Disp: , Rfl:     SPS 15 GM/60ML suspension, TAKE 60 ML BY MOUTH SINGLE DOSE FOR EMERGENCY USE DURING MISSED  "HEMODIALYSIS, Disp: , Rfl:     traZODone (DESYREL) 50 mg tablet, Take 0.5 tablets (25 mg total) by mouth daily at bedtime as needed for sleep, Disp: 30 tablet, Rfl: 3    metoclopramide (REGLAN) 5 mg tablet, TAKE 1 TABLET BY MOUTH THREE TIMES DAILY AS NEEDED (NAUSEA) (Patient not taking: Reported on 2/26/2024), Disp: 90 tablet, Rfl: 0  Allergies   Allergen Reactions    Sulfa Antibiotics Rash       Labs:     Chemistry        Component Value Date/Time     10/28/2015 1743    K 4.2 02/08/2024 0607    K 4.1 10/28/2023 0722    CL 99 02/08/2024 0607    CL 99 (L) 10/28/2023 0722    CO2 30 02/08/2024 0607    CO2 34 (H) 10/28/2023 0722    BUN 22 02/08/2024 0607    BUN 22 10/28/2023 0722    CREATININE 6.10 (H) 02/08/2024 0607    CREATININE 8.90 (H) 10/28/2023 0722        Component Value Date/Time    CALCIUM 8.3 (L) 02/08/2024 0607    CALCIUM 8.4 (L) 10/28/2023 0722    ALKPHOS 74 02/06/2024 0529    ALKPHOS 75 10/28/2023 0722    AST 13 02/06/2024 0529    AST 20 10/28/2023 0722    ALT 8 02/06/2024 0529    ALT 14 10/28/2023 0722    BILITOT 0.49 10/28/2015 1743            Lab Results   Component Value Date    CHOL 137 10/27/2015     Lab Results   Component Value Date    HDL 33 (L) 01/14/2024    HDL 38 (L) 02/21/2020    HDL 35 (L) 09/08/2018     Lab Results   Component Value Date    LDLCALC 29 01/14/2024    LDLCALC 71 02/21/2020    LDLCALC 37 09/08/2018     Lab Results   Component Value Date    TRIG 128 01/14/2024    TRIG 93 02/21/2020    TRIG 65 09/08/2018     No results found for: \"CHOLHDL\"    Imaging: MRA head wo contrast    Result Date: 2/21/2024  Narrative: MRA BRAIN WITHOUT CONTRAST INDICATION:  I60.9: Nontraumatic subarachnoid hemorrhage, unspecified I60.6: Nontraumatic subarachnoid hemorrhage from other intracranial arteries COMPARISON: 1/13/2024; 1/12/2024 TECHNIQUE:  Axial 3-D time-of-flight imaging with 3-D reconstructions performed without contrast. IV Contrast:  Not administered. FINDINGS: IMAGE QUALITY:  " Diagnostic. ANATOMY INTERNAL CAROTID ARTERIES:  Normal flow related signal of the distal cervical, petrous and cavernous segments of the internal carotid arteries.  Normal ICA terminus. ANTERIOR CIRCULATION:  Normal A1 segments.  Normal anterior communicating artery.  Normal flow-related signal of the anterior cerebral arteries. MIDDLE CEREBRAL ARTERY CIRCULATION:  The M1 segment and middle cerebral artery branches demonstrate normal flow-related signal. DISTAL VERTEBRAL ARTERIES:  Distal vertebral arteries are patent with a normal vertebrobasilar junction.  The posterior inferior cerebellar artery origins are normal. BASILAR ARTERY:  Normal. POSTERIOR CEREBRAL ARTERIES:  Both posterior cerebral arteries arises from the basilar tip.  Both arteries demonstrate normal flow-related enhancement.  Patent posterior communicating arteries.     Impression: No intracranial aneurysm or major intracranial arterial stenosis. Workstation performed: [id number]     MRI brain wo contrast    Result Date: 2/21/2024  Narrative: MRI BRAIN WITHOUT CONTRAST INDICATION: I60.9: Nontraumatic subarachnoid hemorrhage, unspecified. COMPARISON:   2/3/2024; 1/14/2024; discharge summary 1/23/2024 TECHNIQUE:  Multiplanar, multisequence imaging of the brain was performed. IMAGE QUALITY:  Diagnostic. FINDINGS: BRAIN PARENCHYMA: Multifocal gliosis and encephalomalacia in a bandlike distribution in the supratentorial brain redemonstrated. Additionally there are scattered other foci of gliosis and encephalomalacia in the parasagittal aspect of the left cerebellum, series 4 image 8 and inferior left occipital lobe, series 4 image 9, also stable. Chronic appearing bilateral basal ganglia lacunar infarctions noted. Evolving diffusion restriction most pronounced in the left centrum semiovale. Superficial siderosis left frontoparietal region redemonstrated series 6 image 60. Additionally anteriorly in the right frontal lobe hemosiderin staining versus  siderosis series 6 image 54, also stable. No acute intracranial hemorrhage. VENTRICLES:  Normal for the patient's age. SELLA AND PITUITARY GLAND:  Normal. ORBITS:  Normal. PARANASAL SINUSES: Right mastoid air cell effusion is significant, stable. VASCULATURE:  Evaluation of the major intracranial vasculature demonstrates appropriate flow voids. CALVARIUM AND SKULL BASE:  Normal. EXTRACRANIAL SOFT TISSUES:  Normal.     Impression: 1. Evolving multifocal subacute infarctions without evidence of interval acute infarction. 2. Scattered areas of siderosis redemonstrated. Consider follow-up imaging to include intravenous contrast to exclude underlying enhancing mass lesion. 3. Large right mastoid air cell effusion redemonstrated. Workstation performed: [id number]     CT head without contrast    Result Date: 2/3/2024  Narrative: CT BRAIN - WITHOUT CONTRAST INDICATION:   Subarachnoid hemorrhage 1 month ago with residual right sided deficits, headache and hypertension this evening, concern for re-bleeding. COMPARISON: MR brain dated 1/14/2024, CT dated 1/9/2024 TECHNIQUE:  CT examination of the brain was performed.  Multiplanar 2D reformatted images were created from the source data. Radiation dose length product (DLP) for this visit:  1041 mGy-cm .  This examination, like all CT scans performed in the [facility], was performed utilizing techniques to minimize radiation dose exposure, including the use of iterative reconstruction and automated exposure control. IMAGE QUALITY:  Diagnostic. FINDINGS: PARENCHYMA: Again seen is decreased attenuation is noted in periventricular and subcortical white matter demonstrating an appearance that is statistically most likely to represent mild microangiopathic change, similar to prior CT, and MRI accounting for differences in technique. Resolution of previously seen left temporal subarachnoid hemorrhage and right ambient cistern subarachnoid hemorrhage. No discrete  evidence of acute intracranial hemorrhage identified. No CT signs of acute infarction.  No intracranial mass, mass effect or midline shift. VENTRICLES AND EXTRA-AXIAL SPACES:  Normal for the patient's age. VISUALIZED ORBITS: Normal visualized orbits. PARANASAL SINUSES: Within normal limits. CALVARIUM AND EXTRACRANIAL SOFT TISSUES: Persistent opacification of the right mastoid air cells.     Impression: Resolution of previously identified intracranial hemorrhage. No acute intracranial hemorrhage. See full report for detailed findings. Workstation performed: [id number]     XR chest 1 view portable    Result Date: 1/29/2024  Narrative: CHEST INDICATION:   chest pain. Hypertension with chest pain and nausea COMPARISON: Chest radiograph January 4 2024 EXAM PERFORMED/VIEWS:  XR CHEST PORTABLE  AP semierect FINDINGS: Cardiomediastinal silhouette appears unremarkable. Slight interstitial prominence is unchanged. No focal infiltrate. No pneumothorax or pleural effusion. Osseous structures appear within normal limits for patient age.     Impression: No acute cardiopulmonary disease. I have personally reviewed this study including all images.  / [email or web address] Resident: JOANNE CAROLINA I, the attending radiologist, have reviewed the images and agree with the final report above. Workstation performed: [id number]     CT abdomen pelvis with contrast    Result Date: 1/25/2024  Narrative: CT ABDOMEN AND PELVIS WITH IV CONTRAST INDICATION:   Abdominal pain, acute, nonlocalized gen abd pain and vomiting. ESRD on HD.. COMPARISON: 7/15/2022 TECHNIQUE:  CT examination of the abdomen and pelvis was performed.   Axial, sagittal, and coronal 2D reformatted images were created from the source data and submitted for interpretation. Radiation dose length product (DLP) for this visit:  819 mGy-cm .  This examination, like all CT scans performed in the [facility], was performed utilizing techniques to minimize radiation dose  exposure, including the use of iterative reconstruction and automated exposure control. Dual energy technique used. IV Contrast:  80 mL of iohexol (OMNIPAQUE) Enteric Contrast:  Enteric contrast was not administered. FINDINGS: ABDOMEN LOWER CHEST: Mild dependent atelectasis. Similar 0.3 cm right lower lobe (301/4) and mildly smaller 0.5 cm left lower lobe (301/19) nodules, considered benign. Trace right greater than left pleural effusions. Chronic cardiomegaly and atherosclerosis. LIVER/BILIARY TREE:  Within normal limits. GALLBLADDER:  Within normal limits. SPLEEN:  Within normal limits. PANCREAS:  Within normal limits. ADRENAL GLANDS:  Within normal limits. KIDNEYS/URETERS: Chronic cortical atrophy. STOMACH AND BOWEL: High attenuation material in the colon. No oral contrast administered. Normal caliber and wall thickness. APPENDIX:  Within normal limits. ABDOMINOPELVIC CAVITY: Small amount of right flank/paracolic gutter and presacral No fluid collections, free air or enlarged lymph nodes. Fluid. VESSELS:  Atherosclerosis.  Normal aortic caliber.  Patent central mesenteric vessels. PELVIS: REPRODUCTIVE ORGANS:  Prostate and seminal vesicles within normal limits. Vas deferens calcification typical of diabetes. URINARY BLADDER:  Within normal limits. ABDOMINAL WALL/INGUINAL REGIONS: Soft tissue edema. No fluid collections or suspicious masses. OSSEOUS STRUCTURES:  Degenerative changes.     Impression: Trace pleural and abdominal fluid and soft tissue edema likely related to patient's volume status. No specific findings to explain abdominal pain and vomiting. Workstation performed: [id number]       ECG:  n/a    Review of Systems   Constitutional: Negative for malaise/fatigue and weight gain.   HENT: Negative.     Eyes: Negative.    Cardiovascular:  Negative for chest pain, dyspnea on exertion, irregular heartbeat, leg swelling, near-syncope, orthopnea, palpitations, paroxysmal nocturnal dyspnea and syncope.  "  Respiratory:  Negative for sleep disturbances due to breathing.    Endocrine: Negative.    Hematologic/Lymphatic: Negative for bleeding problem.   Skin: Negative.    Musculoskeletal: Negative.    Gastrointestinal: Negative.    Genitourinary: Negative.    Neurological:  Positive for dizziness and weakness. Negative for headaches and light-headedness.   Psychiatric/Behavioral: Negative.         Vitals:    02/26/24 0959   BP:    Pulse:    SpO2: 95%     Vitals:    02/26/24 0858   Weight: 94.7 kg (208 lb 12.8 oz)     Height: 5' 5\" (165.1 cm)   Body mass index is 34.75 kg/m².    Physical Exam  Constitutional:       General: He is not in acute distress.  HENT:      Head: Normocephalic.      Nose: Nose normal.      Mouth/Throat:      Mouth: Mucous membranes are moist.      Pharynx: Oropharynx is clear.   Neck:      Vascular: No carotid bruit or JVD.   Cardiovascular:      Rate and Rhythm: Normal rate and regular rhythm.      Heart sounds: Murmur heard.   Pulmonary:      Effort: Pulmonary effort is normal.      Breath sounds: Decreased breath sounds present. No wheezing, rhonchi or rales.   Musculoskeletal:         General: Normal range of motion.      Cervical back: Normal range of motion.      Right lower leg: No edema.      Left lower leg: No edema.      Comments: Uses rolling walker    Skin:     General: Skin is warm and dry.   Neurological:      Mental Status: He is alert and oriented to person, place, and time.   Psychiatric:         Mood and Affect: Mood normal.         Behavior: Behavior normal.       "

## 2024-02-23 NOTE — TELEPHONE ENCOUNTER
Called and left message for patient to return a call to [phone] and schedule 4 month follow up with provider (6/22/2024). Please schedule upon return call. Thank you.

## 2024-02-23 NOTE — TELEPHONE ENCOUNTER
Post CVA Discharge Follow Up  Hospitalization: 1/5/24-1/23/24, 1/25/24-1/27/24, 1/29/24 ED visit    Called patient. Since discharge, he denies experiencing any new or worsening stroke-like symptoms. Patient reports he is doing okay and how he is improving.     Patient uses a Rolator walker. He lives with his parents and they help provide assistance when needed.    Reviewed appointments - patient successfully followed up with PCP. Patient scheduled for the following: cardiology on 2/26/24, neurosurgery on 2/28/24, neurology on 4/3/24.    He is participating with PT and OT.    Reviewed medications with him. Reports he is taking as prescribed with no medication side effects or signs of bleeding.    During this call, we reviewed stroke type, symptoms, personal risk factors and management, medications.    As for risk factors, patient reports monitoring his BP at home and how it is typically in the 110s-140s for systolic and in the 80s for diastolic. Reports his BS have been well controlled. He has an insulin pump.  He is a non smoker.  He denies any issues with his appetite.     All of his questions were addressed. At the conclusion of the conversation, patient denies having any further questions or concerns.

## 2024-02-26 ENCOUNTER — OFFICE VISIT (OUTPATIENT)
Dept: CARDIOLOGY CLINIC | Facility: CLINIC | Age: 39
End: 2024-02-26
Payer: MEDICARE

## 2024-02-26 VITALS
BODY MASS INDEX: 34.79 KG/M2 | DIASTOLIC BLOOD PRESSURE: 72 MMHG | HEART RATE: 75 BPM | SYSTOLIC BLOOD PRESSURE: 108 MMHG | OXYGEN SATURATION: 95 % | HEIGHT: 65 IN | WEIGHT: 208.8 LBS

## 2024-02-26 DIAGNOSIS — G47.33 OSA (OBSTRUCTIVE SLEEP APNEA): Chronic | ICD-10-CM

## 2024-02-26 DIAGNOSIS — Z99.2 TYPE 1 DIABETES MELLITUS WITH CHRONIC KIDNEY DISEASE ON CHRONIC DIALYSIS (HCC): ICD-10-CM

## 2024-02-26 DIAGNOSIS — E78.2 MIXED HYPERLIPIDEMIA: Chronic | ICD-10-CM

## 2024-02-26 DIAGNOSIS — N18.6 TYPE 1 DIABETES MELLITUS WITH CHRONIC KIDNEY DISEASE ON CHRONIC DIALYSIS (HCC): ICD-10-CM

## 2024-02-26 DIAGNOSIS — N18.6 ESRD ON HEMODIALYSIS (HCC): Chronic | ICD-10-CM

## 2024-02-26 DIAGNOSIS — Z99.2 ESRD ON HEMODIALYSIS (HCC): Chronic | ICD-10-CM

## 2024-02-26 DIAGNOSIS — E10.22 TYPE 1 DIABETES MELLITUS WITH CHRONIC KIDNEY DISEASE ON CHRONIC DIALYSIS (HCC): ICD-10-CM

## 2024-02-26 DIAGNOSIS — I25.10 CORONARY ARTERY DISEASE INVOLVING NATIVE CORONARY ARTERY OF NATIVE HEART WITHOUT ANGINA PECTORIS: Chronic | ICD-10-CM

## 2024-02-26 DIAGNOSIS — I10 PRIMARY HYPERTENSION: ICD-10-CM

## 2024-02-26 PROCEDURE — 99214 OFFICE O/P EST MOD 30 MIN: CPT

## 2024-02-26 RX ORDER — CALCIUM ACETATE 667 MG/1
TABLET ORAL
COMMUNITY
Start: 2024-01-23 | End: 2024-02-26 | Stop reason: ALTCHOICE

## 2024-02-26 NOTE — PATIENT INSTRUCTIONS
Please take your medications as prescribed without skipping any doses. Continue checking your blood pressure once daily. The best time to check your blood pressure is at least one hour after you take your blood pressure medication. Keep a log of your blood pressures. Call the office in 2 weeks with updated blood pressure readings.     Make sure to do slow position changes from sitting to standing to hopefully help with your dizziness you are having. You can also try compression socks either knee high or thigh high to see if this helps.     If you have any questions or concerns you can always reach out.     Thank you,  DEISI Gordon

## 2024-02-29 ENCOUNTER — OFFICE VISIT (OUTPATIENT)
Facility: CLINIC | Age: 39
End: 2024-02-29
Payer: MEDICARE

## 2024-02-29 DIAGNOSIS — I63.89 CEREBROVASCULAR ACCIDENT (CVA) DUE TO OTHER MECHANISM (HCC): Primary | ICD-10-CM

## 2024-02-29 DIAGNOSIS — I63.9 ACUTE CVA (CEREBROVASCULAR ACCIDENT) (HCC): Primary | ICD-10-CM

## 2024-02-29 PROCEDURE — 97530 THERAPEUTIC ACTIVITIES: CPT

## 2024-02-29 PROCEDURE — 97112 NEUROMUSCULAR REEDUCATION: CPT | Performed by: PHYSICAL THERAPIST

## 2024-02-29 NOTE — PROGRESS NOTES
Daily Note     Today's date: 2024  Patient name: Mateus Mckeon  : 1985  MRN: [id number]  Referring provider: Dania Sher DO  Dx:   Encounter Diagnosis     ICD-10-CM    1. Acute CVA (cerebrovascular accident) (HCC)  I63.9           POC expires Auth Status Total   Visits  Start date  Expiration date PT/OT + Visit Limit? Co-Insurance   24     bomn                                            Visit/Unit Tracking  AUTH Status:  Date                 Authed:  Used                Remaining                               Subjective: Pt reports no new changes.       Objective: See treatment diary below    Baseline HR 60 bpm, SpO2 97%     SOLO STEP:  - Treadmill up to 1.5 mph, 3% incline, 10 min total (HR 73 bpm, SpO2 91%)  - Walking fwd/bwd, no AD, 10 laps  - Sidestepping, no AD, 6 laps  - Sit to stands 5 reps with foam on chair, 5 reps no foam on chair     Modified CTSIB  FTEO firm 30 sec  FTEC firm 30 sec  FTEO foam 30 sec, increased sway  FTEC foam 1 sec     Assessment: Pt tolerated treatment well today. Able to tolerate 10 min on treadmill today with moderate fatigue reported after. No LOB when walking without AD, but still used solo step for safety. He had significant difficulty with foam conditions of modified CTSIB, due to neuropathy. Pt would benefit from continued PT to improve balance, endurance, and reduce fall risk.       Plan: Continue per plan of care.  Balance &  gait. PERFORM 10 METER WALK TEST, MEDRANO, FGA NEXT VISIT.        Outcome Measures Initial Eval  24 Re-eval  24       5xSTS 31.25 sec 52.20 sec no UE's    (1 LOB)       TUG 16.3 sec with rollator    23.6 sec no AD 20.68 sec with rollator    18.44 sec no AD       10 meter  Next visit>       MEDRANO  Next visit>       FGA  Next visit>       6MWT 900 ft 710 ft       mCTSIB  FTEO firm  FTEC firm  FTEO foam  FTEC foam   Next visit>       ABC  65%

## 2024-02-29 NOTE — PROGRESS NOTES
Daily Note     Today's date: 2024  Patient name: Mateus Mckeon  : 1985  MRN: [id number]  Referring provider: No ref. provider found  Dx:   Encounter Diagnosis     ICD-10-CM    1. Cerebrovascular accident (CVA) due to other mechanism (HCC)  I63.89           Start Time: 08  Stop Time: 0845  Total time in clinic (min): 40 minutes      PLAN OF CARE START: 24  PLAN OF CARE END: 2024  FREQUENCY: 2 times a week  PRECAUTIONS: renal failure, actively going through dialysis; type 1 diabetes; HTN; SAH; seizure (last one was 2019)      Subjective: Pt reports he notes increased tightness in forearm flexors which results in unintentional digit flexion. Pt educated on ways to manage spasticity at home.        Objective: See treatment diary below    TA:   -completed x15 then x10 of red flexbar pronation and supination to improve forearm strength and UE stability. Pt demo difficulty initiating movements however encouraged to trial.   -completed 2x10 red flex bar wrist flexion/extension to improve wrist strength   -completed WB with R UE onto foam while donning pegs into peg board with L x15; task upgraded to increase challenge   -completed WB with R UE onto foam while donning connect 4 pieced into board with L. Visual prompt given with letters; pt asked to name foods that began with prompted letter. Pt required min verbal cues for recall.       Assessment: Tolerated treatment well. Patient demonstrated fatigue post treatment and would benefit from continued OT      Plan: Continue per plan of care.     SHORT TERM GOALS ADDED 24:  Pt will increase sustained attention by completing trail making part A in 35 seconds to complete IADLs  Pt will increase divided attention by completing trail making part B in 1 minute 30 seconds to complete IADLs  Pt will increase delayed recall and recall 4 /5 items on MOCA to complete IADLs    LONG TERM GOALS ADDED 24:  Pt will increase sustained attention by  completing trail making part A in 38 seconds to complete IADLs  Pt will increase divided attention by completing trail making part B in 1 minute 10 seconds to complete IADLs  Pt will increase delayed recall and recall 5/5 items on MOCA to complete IADLs  Resume right  hand dominance to refined functional assist with all activities of daily living

## 2024-03-01 ENCOUNTER — OFFICE VISIT (OUTPATIENT)
Facility: CLINIC | Age: 39
End: 2024-03-01
Payer: MEDICARE

## 2024-03-01 DIAGNOSIS — I63.9 ACUTE CVA (CEREBROVASCULAR ACCIDENT) (HCC): Primary | ICD-10-CM

## 2024-03-01 DIAGNOSIS — I63.89 CEREBROVASCULAR ACCIDENT (CVA) DUE TO OTHER MECHANISM (HCC): Primary | ICD-10-CM

## 2024-03-01 PROCEDURE — 97112 NEUROMUSCULAR REEDUCATION: CPT

## 2024-03-01 PROCEDURE — 97112 NEUROMUSCULAR REEDUCATION: CPT | Performed by: PHYSICAL THERAPIST

## 2024-03-01 PROCEDURE — 97530 THERAPEUTIC ACTIVITIES: CPT

## 2024-03-01 NOTE — PROGRESS NOTES
Daily Note     Today's date: 3/1/2024  Patient name: Mateus Mckeon  : 1985  MRN: [id number]  Referring provider: Dania hSer DO  Dx:   Encounter Diagnosis     ICD-10-CM    1. Acute CVA (cerebrovascular accident) (HCC)  I63.9           POC expires Auth Status Total   Visits  Start date  Expiration date PT/OT + Visit Limit? Co-Insurance   24     bomn                                            Visit/Unit Tracking  AUTH Status:  Date                 Authed:  Used                Remaining                               Subjective: Pt reports his R arm/wrist is sore from OT yesterday. He did feel fatigued from PT yesterday.        Objective: See treatment diary below    Baseline HR 60 bpm, SpO2 97%     SOLO STEP:  - Treadmill up to 1.5-1.7 mph, 3% incline, 10 min total  - Sit to stands 5 reps with foam on chair, holding 5.5# tidal tank (2 sets)  - Sit to stands 5 reps no foam on chair, holding 5.5# tidal tank (2 sets)  - Walking fwd/bwd, no AD, 10 laps  - Sidestepping to blaze pod taps, no AD, 2 min       Assessment: Pt tolerated treatment well today. Finished outcome measures today; pt is deemed high fall risk per Britta. He was most challenged with narrow COLBY activities and step taps. Increased speed on treadmill today which pt tolerated well with no LOB or gait deviations. He had a few minor stumbles during blaze pod taps, requiring CG overall. Pt would benefit from continued PT to improve balance, endurance, and reduce fall risk.       Plan: Continue per plan of care.  Balance &  gait.       Outcome Measures Initial Eval  24 Re-eval  24 & DN 3/1/2024         5xSTS 31.25 sec 52.20 sec no UE's    (1 LOB)       TUG 16.3 sec with rollator    23.6 sec no AD 20.68 sec with rollator    18.44 sec no AD       10 meter  Next visit>       MEDRANO  42/56       FGA  Not performed at this time        6MWT 900 ft 710 ft       mCTSIB  FTEO firm  FTEC firm  FTEO foam  FTEC foam     30 sec  30  sec  30 sec, +  1 sec        ABC  65%

## 2024-03-01 NOTE — PROGRESS NOTES
"Daily Note     Today's date: 3/1/2024  Patient name: Mateus Mckeon  : 1985  MRN: [id number]  Referring provider: No ref. provider found  Dx:   Encounter Diagnosis     ICD-10-CM    1. Cerebrovascular accident (CVA) due to other mechanism (HCC)  I63.89           Start Time: 0802  Stop Time: 0843  Total time in clinic (min): 41 minutes      PLAN OF CARE START: 24  PLAN OF CARE END: 2024  FREQUENCY: 2 times a week  PRECAUTIONS: Renal failure, actively going through dialysis; type 1 diabetes; HTN; SAH; seizure (last one was 2019)      Subjective: Pt reports he had to ice his wrist last night due to \"overdoing it\" with stretching. Pt reports he purchased a red flexbar to improve forearm and wrist strength and stability. Pt also reports he acquired a massager from his sister to improve spasticity/tone.       Objective: See treatment diary below    TA:   -completed 2pt pinch to don qbitz pieces into board to copy visual prompt x2. Focused on 2pt pinch and attention.    -completed 3pt pinch tweezers to don marbles onto marble mosaic. Focused on 3pt pinch and UE coordination.     NMR:  -completed spinal rotations x10 each side on high plinth to improve posture and WB tolerance on R UE  -completed x5 then x6 high plinth push ups to improve proximal strength and stability as well as weight bearing tolerance.       Assessment: Tolerated treatment well. Patient demonstrated fatigue post treatment and would benefit from continued OT      Plan: Continue per plan of care.     SHORT TERM GOALS ADDED 24:  Pt will increase sustained attention by completing trail making part A in 35 seconds to complete IADLs  Pt will increase divided attention by completing trail making part B in 1 minute 30 seconds to complete IADLs  Pt will increase delayed recall and recall 4 /5 items on MOCA to complete IADLs    LONG TERM GOALS ADDED 24:  Pt will increase sustained attention by completing trail making part A in 38 " seconds to complete IADLs  Pt will increase divided attention by completing trail making part B in 1 minute 10 seconds to complete IADLs  Pt will increase delayed recall and recall 5/5 items on MOCA to complete IADLs  Resume right  hand dominance to refined functional assist with all activities of daily living

## 2024-03-04 ENCOUNTER — OFFICE VISIT (OUTPATIENT)
Facility: CLINIC | Age: 39
End: 2024-03-04
Payer: MEDICARE

## 2024-03-04 ENCOUNTER — APPOINTMENT (OUTPATIENT)
Facility: CLINIC | Age: 39
End: 2024-03-04
Payer: MEDICARE

## 2024-03-04 DIAGNOSIS — I63.89 CEREBROVASCULAR ACCIDENT (CVA) DUE TO OTHER MECHANISM (HCC): Primary | ICD-10-CM

## 2024-03-04 PROCEDURE — 97112 NEUROMUSCULAR REEDUCATION: CPT

## 2024-03-04 NOTE — PROGRESS NOTES
"Daily Note     Today's date: 3/4/2024  Patient name: Mateus Mckeon  : 1985  MRN: [id number]  Referring provider: No ref. provider found  Dx:   Encounter Diagnosis     ICD-10-CM    1. Cerebrovascular accident (CVA) due to other mechanism (HCC)  I63.89           Start Time: 08  Stop Time: 0844  Total time in clinic (min): 43 minutes      PLAN OF CARE START: 24  PLAN OF CARE END: 2024  FREQUENCY: 2 times a week  PRECAUTIONS: Renal failure, actively going through dialysis; type 1 diabetes; HTN; SAH; seizure (last one was 2019)      Subjective: Pt reports he has been doing his HEP    Objective: See treatment diary below    NMR:  -Lateral push ups 30x on red dynadisc for WB and proprioceptive input thru the R UE. Pt initially had complaints of dorsal sided wrist pain (2/10) when the wrist was in full extension - adjusted form to keep wrist in neutral, which pt reported no wrist pain with.   -Seated rows with holding cone and green t-band for resistance. 2x10  -Seated punches with holding cone and green t-band for resistance. 2x10 - focus on full elbow extension and forearm rotation to prep for reaching tasks.  -Pulling 1\" velcro blocks off board placed anteriorly and vertically on slant board - placing to the R side. 30 reps. Then pt picking up all bocks from the L side with R hand with tip pinch and placing onto the velcro board. Focus on grasp/release, tip pinch, FMC, functional reaching, UE endurance.     Assessment: Tolerated treatment well. Moderate fatigue reports in the R shoulder post session today. Patient demonstrated fatigue post treatment and would benefit from continued OT      Plan: Continue per plan of care.     SHORT TERM GOALS ADDED 24:  Pt will increase sustained attention by completing trail making part A in 35 seconds to complete IADLs  Pt will increase divided attention by completing trail making part B in 1 minute 30 seconds to complete IADLs  Pt will increase delayed " recall and recall 4 /5 items on MOCA to complete IADLs    LONG TERM GOALS ADDED 2/20/24:  Pt will increase sustained attention by completing trail making part A in 38 seconds to complete IADLs  Pt will increase divided attention by completing trail making part B in 1 minute 10 seconds to complete IADLs  Pt will increase delayed recall and recall 5/5 items on MOCA to complete IADLs  Resume right  hand dominance to refined functional assist with all activities of daily living

## 2024-03-06 ENCOUNTER — OFFICE VISIT (OUTPATIENT)
Facility: CLINIC | Age: 39
End: 2024-03-06
Payer: MEDICARE

## 2024-03-06 DIAGNOSIS — I63.9 ACUTE CVA (CEREBROVASCULAR ACCIDENT) (HCC): Primary | ICD-10-CM

## 2024-03-06 DIAGNOSIS — I63.89 CEREBROVASCULAR ACCIDENT (CVA) DUE TO OTHER MECHANISM (HCC): Primary | ICD-10-CM

## 2024-03-06 PROCEDURE — 97112 NEUROMUSCULAR REEDUCATION: CPT

## 2024-03-06 NOTE — PROGRESS NOTES
Daily Note     Today's date: 3/6/2024  Patient name: Mateus Mckeon  : 1985  MRN: [id number]  Referring provider: Dania Sher DO  Dx:   Encounter Diagnosis     ICD-10-CM    1. Cerebrovascular accident (CVA) due to other mechanism (HCC)  I63.89           Start Time: 0800  Stop Time: 0845  Total time in clinic (min): 45 minutes      PLAN OF CARE START: 24  PLAN OF CARE END: 2024  FREQUENCY: 2 times a week  PRECAUTIONS: Renal failure, actively going through dialysis; type 1 diabetes; HTN; SAH; seizure (last one was 2019)      Subjective: Pt reports he has difficulty with donning deodorant L axilla.    Objective: See treatment diary below    NMR:  -Lateral push ups 30x with focus on proprioceptive input R UE for spasticity mgmt and massed practice elbow extension    -Shoulder flexion sitting EOM with 2lb therabar 10x2 with 3 second isometric holds     -Scapular rows 12x2 with 2lb therabar and green theraband with 3 second isometric holds in retraction     -Large peg retrieval 90* to R and placement into board anteriorly with focus on massed practice shoulder IR/ER, pincer grasp, rotation of items in fingertips, dynamic reaching.  1 drop and requires inc time for all aspects but demonstrates improved emerging dexterity skills    -PNF D1 flexion with use of saebo glide to simulate motion of donning deodorant to contralateral UE. 12x2 with rest break between    TA:  -Pt education on appropriate positioning and hemiparetic limb mgmt to decrease edema and promote improved neuroplasticity including elevation, movement of UE, forced use of UE during functional activities, compression glove    Assessment: Tolerated treatment well. Benefits from frequent rest breaks 2* poor endurance/activity tolerance. Demonstrating improved emerging dexterity skills in R UE.  Patient demonstrated fatigue post treatment and would benefit from continued OT      Plan: Continue per plan of care.     SHORT TERM GOALS  ADDED 2/20/24:  Pt will increase sustained attention by completing trail making part A in 35 seconds to complete IADLs  Pt will increase divided attention by completing trail making part B in 1 minute 30 seconds to complete IADLs  Pt will increase delayed recall and recall 4 /5 items on MOCA to complete IADLs    LONG TERM GOALS ADDED 2/20/24:  Pt will increase sustained attention by completing trail making part A in 38 seconds to complete IADLs  Pt will increase divided attention by completing trail making part B in 1 minute 10 seconds to complete IADLs  Pt will increase delayed recall and recall 5/5 items on MOCA to complete IADLs  Resume right  hand dominance to refined functional assist with all activities of daily living

## 2024-03-06 NOTE — PROGRESS NOTES
"Daily Note     Today's date: 3/6/2024  Patient name: Mateus Mckeon  : 1985  MRN: [id number]  Referring provider: Dania Sher DO  Dx:   Encounter Diagnosis     ICD-10-CM    1. Acute CVA (cerebrovascular accident) (HCC)  I63.9           POC expires Auth Status Total   Visits  Start date  Expiration date PT/OT + Visit Limit? Co-Insurance   24     bomn                                            Visit/Unit Tracking  AUTH Status:  Date                 Authed:  Used                Remaining                               Subjective: Pt reports he felt fatigued after last visit. Reports he had some trouble with his glucose monitor over the weekend but he is back on track today. 156 start of session.       Objective: See treatment diary below    Baseline /75 mmHg RUE, HR 73 bpm, SpO2 98%   Post-session BP: 149/78 mmHg RUE    Provided bandage due to noted skin abrasion on R elbow start of session, discussed informing PCP if it worsens or he has any concerns.     SOLO STEP:  - FWD stepping over (4) 9\" hurdles: 4 reps, 2 sets, no UE   - LAT stepping over (4) 9\" hurdles, 3 cycles down/back, no UE   - Step up to 2 medium river rocks, 15 reps B/L, no UE > light touch 1 UE   - FWD/BCK walking, no AD: 10 feet x 10 reps  - A-P sway x 20 reps   - Feet close EC x 30 seconds  - High knee marching: 4 x 10 feet, no UE       Assessment: Pt tolerated treatment well today. One LoB when performing FWD hurdles requiring mod A to steady. Poor posterior balance reactions as noted by frequent use of multiple compensatory steps to stabilize upon near LoB. Noted difficulty controlling ankle strategy with A-P sway. Pt would benefit from continued PT to improve balance, endurance, and reduce fall risk.       Plan: Continue per plan of care.  Balance &  gait.       Outcome Measures Initial Eval  24 Re-eval  24 & DN 3/1/2024         5xSTS 31.25 sec 52.20 sec no UE's    (1 LOB)       TUG 16.3 sec with " rollator    23.6 sec no AD 20.68 sec with rollator    18.44 sec no AD       10 meter  Next visit>       MEDRANO  42/56       FGA 12/30 Not performed at this time        6MWT 900 ft 710 ft       mCTSIB  FTEO firm  FTEC firm  FTEO foam  FTEC foam     30 sec  30 sec  30 sec, +  1 sec        ABC  65%

## 2024-03-07 ENCOUNTER — OFFICE VISIT (OUTPATIENT)
Dept: INTERNAL MEDICINE CLINIC | Facility: CLINIC | Age: 39
End: 2024-03-07
Payer: MEDICARE

## 2024-03-07 VITALS
OXYGEN SATURATION: 97 % | TEMPERATURE: 98.4 F | RESPIRATION RATE: 16 BRPM | HEIGHT: 65 IN | SYSTOLIC BLOOD PRESSURE: 116 MMHG | WEIGHT: 203 LBS | HEART RATE: 74 BPM | DIASTOLIC BLOOD PRESSURE: 68 MMHG | BODY MASS INDEX: 33.82 KG/M2

## 2024-03-07 DIAGNOSIS — R20.2 ARM PARESTHESIA, RIGHT: Primary | ICD-10-CM

## 2024-03-07 PROCEDURE — 99213 OFFICE O/P EST LOW 20 MIN: CPT | Performed by: INTERNAL MEDICINE

## 2024-03-07 PROCEDURE — G2211 COMPLEX E/M VISIT ADD ON: HCPCS | Performed by: INTERNAL MEDICINE

## 2024-03-07 NOTE — ASSESSMENT & PLAN NOTE
-c/o R arm paresthesia after prolonged immobility while receiving HD. Also experieinces BL arm paresthesia while sleeping  -resolves after 10minutes in different position   -patient will try warm compress and arm sling and change position as often as he is able

## 2024-03-07 NOTE — PROGRESS NOTES
Assessment/Plan:    Problem List Items Addressed This Visit     Arm paresthesia, right - Primary     -c/o R arm paresthesia after prolonged immobility while receiving HD. Also experieinces BL arm paresthesia while sleeping  -resolves after 10minutes in different position   -patient will try warm compress and arm sling and change position as often as he is able            Subjective:      Patient ID: Mateus Mckeon is a 38 y.o. male.    HPI    Last three HD sessions had to be terminated early as his R arm has felt numb as if it fell asleep.  Changing position has helped.  However, he is not allowed to stand without assistance and therefore discouraged.  Also occurs when sleeping, wakes up with it but it involves both arms.  Denies neck pain.  The whole hand up to his elbow has numbness.  Resolves after 10minutes of moving it to a different position.    The following portions of the patient's history were reviewed and updated as appropriate: allergies, current medications, past family history, past medical history, past social history, past surgical history and problem list.      Current Outpatient Medications:   •  aspirin (ECOTRIN LOW STRENGTH) 81 mg EC tablet, Take 81 mg by mouth daily, Disp: , Rfl:   •  atorvastatin (LIPITOR) 40 mg tablet, Take 1 tablet (40 mg total) by mouth every evening, Disp: 90 tablet, Rfl: 3  •  b complex vitamins capsule, Take 1 capsule by mouth daily before lunch , Disp: , Rfl:   •  calcium acetate (PHOSLO) capsule, Take 1 capsule (667 mg total) by mouth 3 (three) times a day, Disp: 90 capsule, Rfl: 0  •  cinacalcet (SENSIPAR) 60 MG tablet, Take 1 tablet (60 mg total) by mouth daily, Disp: 30 tablet, Rfl: 0  •  cloNIDine (CATAPRES-TTS-3) 0.3 mg/24 hr, 1 patch once a week, Disp: , Rfl:   •  escitalopram (LEXAPRO) 20 mg tablet, Take 1 tablet (20 mg total) by mouth daily, Disp: 90 tablet, Rfl: 2  •  famotidine (PEPCID) 40 MG tablet, TAKE 1 TABLET BY MOUTH IN THE MORNING, Disp: 90 tablet,  Rfl: 0  •  gabapentin (NEURONTIN) 100 mg capsule, TAKE 2 CAPSULES BY MOUTH AT BEDTIME, Disp: 180 capsule, Rfl: 1  •  GLUCAGON EMERGENCY 1 MG injection, , Disp: , Rfl: 3  •  Gvoke HypoPen 1-Pack 1 MG/0.2ML SOAJ, , Disp: , Rfl:   •  hydrALAZINE (APRESOLINE) 100 MG tablet, Take 1 tablet (100 mg total) by mouth 3 (three) times a day, Disp: 90 tablet, Rfl: 0  •  hydrOXYzine HCL (ATARAX) 10 mg tablet, Take 1 tablet (10 mg total) by mouth every 6 (six) hours as needed for itching or anxiety, Disp: 30 tablet, Rfl: 3  •  Insulin Disposable Pump (Omnipod 5 G6 Intro, Gen 5,) KIT, , Disp: , Rfl:   •  Insulin Disposable Pump (Omnipod 5 G6 Pod, Gen 5,) MISC, , Disp: , Rfl:   •  labetalol (NORMODYNE) 200 mg tablet, Take 2 tablets (400 mg total) by mouth 3 (three) times a day, Disp: 180 tablet, Rfl: 0  •  lisinopril (ZESTRIL) 40 mg tablet, Take 1 tablet (40 mg total) by mouth daily, Disp: 30 tablet, Rfl: 0  •  minoxidil (LONITEN) 2.5 mg tablet, Take 2 tablets (5 mg total) by mouth 2 (two) times a day, Disp: 120 tablet, Rfl: 0  •  NIFEdipine (PROCARDIA XL) 30 mg 24 hr tablet, Take 2 tablets (60 mg total) by mouth 2 (two) times a day, Disp: 120 tablet, Rfl: 0  •  NovoLOG 100 UNIT/ML injection, , Disp: , Rfl:   •  ondansetron (ZOFRAN) 4 mg tablet, Take 1 tablet (4 mg total) by mouth every 8 (eight) hours as needed for nausea or vomiting, Disp: 90 tablet, Rfl: 0  •  Patiromer Sorbitex Calcium (VELTASSA PO), Take 5 g by mouth once a week, Disp: , Rfl:   •  saccharomyces boulardii (FLORASTOR) 250 mg capsule, Take 250 mg by mouth daily, Disp: , Rfl:   •  SPS 15 GM/60ML suspension, TAKE 60 ML BY MOUTH SINGLE DOSE FOR EMERGENCY USE DURING MISSED HEMODIALYSIS, Disp: , Rfl:   •  traZODone (DESYREL) 50 mg tablet, Take 0.5 tablets (25 mg total) by mouth daily at bedtime as needed for sleep, Disp: 30 tablet, Rfl: 3  •  metoclopramide (REGLAN) 5 mg tablet, TAKE 1 TABLET BY MOUTH THREE TIMES DAILY AS NEEDED (NAUSEA) (Patient not taking: Reported  "on 2/26/2024), Disp: 90 tablet, Rfl: 0    Review of Systems   Constitutional:  Positive for activity change.   Neurological:  Positive for weakness and numbness.         Objective:    /68 (BP Location: Left arm, Patient Position: Sitting, Cuff Size: Standard)   Pulse 74   Temp 98.4 °F (36.9 °C) (Tympanic Core)   Resp 16   Ht 5' 5\" (1.651 m)   Wt 92.1 kg (203 lb)   SpO2 97%   BMI 33.78 kg/m²      Physical Exam  Vitals reviewed.   Musculoskeletal:      Right hand: Swelling present.      Comments: R  4/5, L  5/5   Neurological:      Mental Status: He is alert and oriented to person, place, and time.         "

## 2024-03-08 DIAGNOSIS — K21.9 GASTROESOPHAGEAL REFLUX DISEASE WITHOUT ESOPHAGITIS: ICD-10-CM

## 2024-03-08 RX ORDER — FAMOTIDINE 40 MG/1
40 TABLET, FILM COATED ORAL EVERY MORNING
Qty: 90 TABLET | Refills: 0 | Status: SHIPPED | OUTPATIENT
Start: 2024-03-08

## 2024-03-11 ENCOUNTER — TELEPHONE (OUTPATIENT)
Dept: NEPHROLOGY | Facility: CLINIC | Age: 39
End: 2024-03-11

## 2024-03-11 NOTE — TELEPHONE ENCOUNTER
Returned pt mother vmail that was left in regard to scheduling pt yearly follow up appointment with Dr. Riley, pt is scheduled per last visit pt was due back for May

## 2024-03-12 ENCOUNTER — APPOINTMENT (OUTPATIENT)
Facility: CLINIC | Age: 39
End: 2024-03-12
Payer: MEDICARE

## 2024-03-15 ENCOUNTER — OFFICE VISIT (OUTPATIENT)
Facility: CLINIC | Age: 39
End: 2024-03-15
Payer: MEDICARE

## 2024-03-15 DIAGNOSIS — I63.89 CEREBROVASCULAR ACCIDENT (CVA) DUE TO OTHER MECHANISM (HCC): Primary | ICD-10-CM

## 2024-03-15 PROCEDURE — 97112 NEUROMUSCULAR REEDUCATION: CPT

## 2024-03-15 NOTE — PROGRESS NOTES
"Daily Note     Today's date: 3/15/2024  Patient name: Mateus Mckeon  : 1985  MRN: [id number]  Referring provider: Dania Sher DO  Dx:   Encounter Diagnosis     ICD-10-CM    1. Cerebrovascular accident (CVA) due to other mechanism (HCC)  I63.89             Start Time: 0805  Stop Time: 0845  Total time in clinic (min): 40 minutes      PLAN OF CARE START: 24  PLAN OF CARE END: 2024  FREQUENCY: 2 times a week  PRECAUTIONS: Renal failure, actively going through dialysis; type 1 diabetes; HTN; SAH; seizure (last one was 2019)      Subjective: Pt reports he was sick due to increased dizziness. Pt also reports feeling as though his arm is \"dead\" when he lays down in bed. Pt also notes dorsal hand edema.     Objective: See treatment diary below    NMR:  -completed weight bearing on red dynadisc with vibration for spasticity management and to improve proprioception.  -Lateral push ups 2x10 with focus on proprioceptive input R UE as well as tricep strength. Vibration input added to tricep to improve muscle activation.    -pt completed seated rows with red theraband and cone 2x10 to improve proximal strength   -pt completed bicep curls 2x10 to improve proximal strength  -pt completed cone reteival and stacking in various planes 2x10 for massed practice of digit flexion and release and to improve shoulder ROM.       TA:  -Pt education on appropriate positioning and hemiparetic limb mgmt to decrease edema and promote improved neuroplasticity including elevation, movement of UE, forced use of UE during functional activities, compression glove      Assessment: Tolerated treatment well. Benefits from frequent rest breaks 2* poor endurance/activity tolerance. Demonstrating improved emerging dexterity skills in R UE. Patient demonstrated fatigue post treatment and would benefit from continued OT      Plan: Continue per plan of care.     SHORT TERM GOALS ADDED 24:  Pt will increase sustained " attention by completing trail making part A in 35 seconds to complete IADLs  Pt will increase divided attention by completing trail making part B in 1 minute 30 seconds to complete IADLs  Pt will increase delayed recall and recall 4 /5 items on MOCA to complete IADLs    LONG TERM GOALS ADDED 2/20/24:  Pt will increase sustained attention by completing trail making part A in 38 seconds to complete IADLs  Pt will increase divided attention by completing trail making part B in 1 minute 10 seconds to complete IADLs  Pt will increase delayed recall and recall 5/5 items on MOCA to complete IADLs  Resume right  hand dominance to refined functional assist with all activities of daily living

## 2024-03-18 DIAGNOSIS — K31.84 GASTROPARESIS: ICD-10-CM

## 2024-03-18 RX ORDER — METOCLOPRAMIDE 5 MG/1
5 TABLET ORAL 3 TIMES DAILY PRN
Qty: 90 TABLET | Refills: 0 | Status: SHIPPED | OUTPATIENT
Start: 2024-03-18

## 2024-03-19 ENCOUNTER — OFFICE VISIT (OUTPATIENT)
Facility: CLINIC | Age: 39
End: 2024-03-19
Payer: MEDICARE

## 2024-03-19 ENCOUNTER — EVALUATION (OUTPATIENT)
Facility: CLINIC | Age: 39
End: 2024-03-19
Payer: MEDICARE

## 2024-03-19 DIAGNOSIS — I63.9 ACUTE CVA (CEREBROVASCULAR ACCIDENT) (HCC): Primary | ICD-10-CM

## 2024-03-19 DIAGNOSIS — I63.89 CEREBROVASCULAR ACCIDENT (CVA) DUE TO OTHER MECHANISM (HCC): Primary | ICD-10-CM

## 2024-03-19 PROCEDURE — 97112 NEUROMUSCULAR REEDUCATION: CPT | Performed by: PHYSICAL THERAPIST

## 2024-03-19 PROCEDURE — 97530 THERAPEUTIC ACTIVITIES: CPT

## 2024-03-19 NOTE — PROGRESS NOTES
"Daily Note     Today's date: 3/19/2024  Patient name: Mateus Mckeon  : 1985  MRN: [id number]  Referring provider: Dania Sher DO  Dx:   Encounter Diagnosis     ICD-10-CM    1. Acute CVA (cerebrovascular accident) (HCC)  I63.9             POC expires Auth Status Total   Visits  Start date  Expiration date PT/OT + Visit Limit? Co-Insurance   24     bomn                                            Visit/Unit Tracking  AUTH Status:  Date                 Authed:  Used                Remaining                               Subjective: Pt reports he has been using treadmill at home, anywhere between 10-18 minutes.       Objective: See treatment diary below  *FLUID RESTRICTION- do not offer water*    HR 82 bpm, SpO2 97%     SOLO STEP:  - FWD/BCK walking, no AD: 10 feet x 10 reps  - FWD stepping over (6) 9\" hurdles: 2 sets, 0 UE  - LAT stepping over (6) 9\" hurdles, 3 cycles down/back, no UE   - Step up to 2 medium river rocks, 15 reps B/L, no UE   - A-P sway x 20 reps  - Feet close EC x 30 seconds      Assessment: Pt tolerated treatment well today. Main limitation is fatigue. He had a few posterior LOB during step ups to river rocks. Pt would benefit from continued PT to improve balance, endurance, and reduce fall risk.       Plan: Continue per plan of care.  Balance &  gait.       Outcome Measures Initial Eval  24 Re-eval  24 & DN 3/1/2024         5xSTS 31.25 sec 52.20 sec no UE's    (1 LOB)       TUG 16.3 sec with rollator    23.6 sec no AD 20.68 sec with rollator    18.44 sec no AD       10 meter  Next visit>       MEDRANO  42/56       FGA  Not performed at this time        6MWT 900 ft 710 ft       mCTSIB  FTEO firm  FTEC firm  FTEO foam  FTEC foam     30 sec  30 sec  30 sec, +  1 sec        ABC  65%               "

## 2024-03-19 NOTE — PROGRESS NOTES
"OCCUPATIONAL THERAPY RE-EVALUATION    Today's Date: 3/19/2024  Patient Name: Mateus Mckeon  : 1985  MRN: [id number]  Referring Provider: Dania Sher,   Dx: Cerebrovascular accident (CVA) due to other mechanism (HCC) [I63.89]        SKILLED ANALYSIS:  Pt presents to re-evaluation on 3/19/24 status post CVA post angioplasty. As per interview, pt reports improvements in \"being able to grab things, when I started I wasn't able to grab anything now I can grab this phone, I can't push the buttons on the phone but I can get it to this hand (L) whereas before I could only push it. I'm wondering if the improvements will follow reduction of the swelling.\" \"I'm definitely seeing progress, probably not as fast as I would like but I think having therapy helps me check on the progress that I'm making or I don't see it in the day to day. Last time when I was here and I did the thing with the cones, I felt like I saw a difference, I wasn't easily able to do that before.\" Pt reports impairments including R hand edema and still difficult performing pincer and 3pt pinches as well as gross grasping. Pt reports difficulty with coordinating movements. Post assessments, pt demonstrating improvements in range of motion of R UE, fine motor coordination and dexterity (additional areas to be re-assessed next visit due to time constraints today). Pt continues to demonstrate impairments in  strength. Pt achieved 1 STG and 1 LTG. Pt would benefit from continued OT services focusing on impairments for 8-12 more weeks. Pt educated on progress/therapy process and pt in understanding and agreement of recommendations. Recommending to continue HEP       Pt has been on the kidney transplant list for 6 years.       Subjective:   \"I would really like to tie my shoes. I'm big on shoes and I hate having my parents tie my shoes.\"    Given pink therasponge for exercise during dialysis      PLEASE COMPLETE MOCA 8.3, TM A AND B, CMT, " "FUGYL JOHNS, AND MAS NEXT SESSION       PLAN OF CARE START: 2/14/24  PLAN OF CARE END: 5/14/2024  FREQUENCY: 2 times a week  PRECAUTIONS: Renal failure, actively going through dialysis; type 1 diabetes; HTN; SAH; seizure (last one was 2019)    Subjective    Mechanism of Injury  Bill is a 38-year-old male with history of end-stage renal disease on dialysis, hypertension, type 1 diabetes, and recent subarachnoid hemorrhage   Pt reports he went to the hospital and they found a brain bleed and reported he needed an angioplasty. Unfortunately, during the angioplasty he suffered a stroke that affected his R UE. Since his stroke he reports his R UE function has improved although he continues with R hand FMC/dexterity deficits.   Overall, pt requires assistance with 75% of his daily activities due to complex medical presentation as well as increased fatigue post-stroke.   \"There's nothing more like purgatory than using your non dominant side.\"    From Hospital note on 1/14/24:   38-year-old man with prior history of cerebellar and pontine strokes, prothrombin gene mutation, MTHFR gene mutation, diabetes, diabetic nephropathy with ESRD on HD, who presented with headache on 1/5 and was found to have subarachnoid hemorrhage in the basilar cisterns.  Unclear etiology.  Patient family reports nausea and retching around the time of headache.     - Underwent conventional angiogram later that day which was unremarkable for any clear source.  - MRI brain on 1/8 demonstrated multiple punctate foci of ischemia in the right MCA, bilateral cerebellar hemispheres, and right velia which may represent sequela of angiograph versus embolic strokes of unclear source (ESUS)   -Repeat angiogram on 1/12 was again unremarkable for source of subarachnoid.     Neurology consulted for right upper extremity weakness noted after second angiogram.  - Repeat MRI demonstrated numerous new embolic appearing foci of ischemia with similar suspected etiology " "as those mentioned above.     Transcranial Dopplers have been unremarkable with Lindegaard ratios consistently less than 3.     Spontaneous basilar cistern subarachnoid hemorrhage of yet unclear etiology.      Embolic appearing strokes, suspect complication of angiogram however cannot exclude additional embolic source.        Occupational Profile  Pt lives in a home with his parents; has one flight to bedroom and has AD throughout the house (grab bars etc.). Paulo reports he needs help with preparing meals (could get by making 1 meal for himself but not all day), laundry, and driving. He is independent with dressing and bathing, \"but it takes it out of me. I could do some things but I need help throughout the day.\"    It is of note that the pt is actively getting dialysis; has to sit still for 4 hours  Pt reports some numbness in fingers on left side (L sided forearm is where dialysis is put in) and notes difficulty with thinking and memory since stroke. No changes in vision     He enjoys spending time with nieces and nephews and would love to get a dog.    PATIENT GOAL: \"I'd really like to get some use of my right hand.\" \"I would like to slowly get more energy.\"    HISTORY OF PRESENT ILLNESS:     PMH:   Past Medical History:   Diagnosis Date    Acute kidney injury (HCC)     Ambulates with cane     Anuria     Anxiety     Cellulitis of right elbow 03/31/2021    Chronic kidney disease     Depression     Diabetes mellitus (HCC)     Diarrhea     Emesis 10/24/2020    End stage renal disease (HCC) 02/11/2018    Formatting of this note might be different from the original. Last Assessment & Plan:  Secondary to DM.  On nightly PD.  Followed by [facility].  [facility] Formatting of this note might be different from the original. Last Assessment & Plan:  Formatting of this note might be different from the original. Lab Results  Component Value Date   EGFR     Eosinophilic " leukocytosis 11/04/2020    Esophagitis 07/21/2015    Falls     Gastroparesis     GERD (gastroesophageal reflux disease)     History of shingles 2010    History of transfusion 02/2018    no adverse reaction    Hyperlipidemia     Hyperphosphatemia     Hypertension     Hypoglycemia 07/15/2022    Itching     Mastoiditis of right side 07/15/2022    Muscle weakness     general unsteadiness    Obesity (BMI 30.0-34.9) 09/09/2019    Orthostatic hypotension 10/25/2020    Peripheral polyneuropathy 11/20/2019    PONV (postoperative nausea and vomiting) 01/26/2018    Protein-calorie malnutrition (HCC) 11/23/2020    Recurrent peritonitis (HCC) due to peritoneal dialysis catheter 07/31/2020    Retinopathy     Seizures (HCC)     early 2020 - one time    Skin abnormality     some dime size areas where skin was scratched from itching    Spontaneous bacterial peritonitis (HCC) 10/19/2020    Squamous cell skin cancer     left temple    Stroke (HCC)     x2 - off balance/no driving/fatigue    Swelling of both lower extremities     Traumatic onycholysis 07/21/2022    Vomiting     Wears glasses        Past Surgical Hx:   Past Surgical History:   Procedure Laterality Date    CARDIAC ELECTROPHYSIOLOGY PROCEDURE N/A 9/21/2023    Procedure: Cardiac loop recorder explant;  Surgeon: Parish Morgan MD;  Location: [facility];  Service: Cardiology    CARDIAC LOOP RECORDER  05/2018    COLONOSCOPY      EGD      EYE SURGERY Right     IR AV FISTULAGRAM/GRAFTOGRAM  02/23/2021    IR CEREBRAL ANGIOGRAPHY  1/12/2024    IR CEREBRAL ANGIOGRAPHY / INTERVENTION  1/5/2024    IR TUNNELED CENTRAL LINE PLACEMENT  02/16/2021    IR TUNNELED DIALYSIS CATHETER PLACEMENT  11/18/2020    IR TUNNELED DIALYSIS CATHETER REMOVAL  02/12/2021    IR TUNNELED DIALYSIS CATHETER REMOVAL  03/11/2021    MOHS SURGERY Left 12/14/2022    Left temple with Dr. Hassan    PERITONEAL CATHETER INSERTION N/A 08/27/2018    Procedure: UNROOF PD CATHETER;  Surgeon: Felipe Lindo DO;   "Location: [facility];  Service: General    TN ARTERIOVENOUS ANASTOMOSIS OPEN DIRECT Left 11/09/2020    Procedure: CREATION FISTULA  ARTERIOVENOUS (AV) - LEFT WRIST;  Surgeon: Placido Altamirano MD;  Location: [facility];  Service: Vascular    TN ESOPHAGOGASTRODUODENOSCOPY TRANSORAL DIAGNOSTIC N/A 04/18/2019    Procedure: ESOPHAGOGASTRODUODENOSCOPY (EGD);  Surgeon: Ale Figueroa MD;  Location: [facility];  Service: Gastroenterology    TN LAPS INSERTION TUNNELED INTRAPERITONEAL CATHETER N/A 08/06/2018    Procedure: LAPAROSCOPIC PD CATHETER PLACEMENT;  Surgeon: Felipe Lindo DO;  Location: [facility];  Service: General    TN REMOVAL TUNNELED INTRAPERITONEAL CATHETER N/A 11/18/2020    Procedure: REMOVAL CATHETER PERITONEAL DIALYSIS;  Surgeon: Abdifatah Ty MD;  Location[facility];  Service: General    TONSILLECTOMY      UPPER GASTROINTESTINAL ENDOSCOPY          Pain:  Location: \"In my feet from neuropathy, it's more annoying than anything.\"     Objective    Upper Extremities:  Pt is right hand dominant    Range of Motion:  AROM:   R UE   - Shoulder flexion: 90% (75%)  - Shoulder extension: 100%  - Elbow flexion 100%   - Elbow extension: 90% (100%)  - Wrist flexion: 70% (50%)  - Wrist extension: 25%  - Pronation: 100%  - Supination: 75% (50%)  - Finger extension:  75% (20%)  - Finger flexion: 75%    L UE : 100%                  ANGEL: RUE: 15lb LUE: 100lb  The age norm is approximately R: 116.8;bs and L: 112.8 lbs, indicating decreased  strength.                NINE-HOLE PEG TEST: assesses dexterity/fine motor coordination   R hand: all 9 pegs in and out in 1 minute and 36 seconds  (previously: 3 pegs in 1 minute and 48.49 seconds)  L hand: 39.59 seconds (previously: 35.14 seconds)  Pt demonstrates decreased FMC compared to norms for age/sex (L: 18.62 seconds and R: 17.71 seconds)       Functional Dexterity Test: assesses patient's ability to use the hand for daily tasks requiring a 3-jaw federico prehension between the " fingers and the thumb   R UE: 2 minutes and 6 seconds to complete 8 with compensatory strategies including the board and 1 peg drop  L UE: 45 seconds   Pt demonstrates decreased dexterity compared to norms for age/sex (R: 21.4 seconds and L: 23.2 seconds)    Subluxation: NONE    Scapular winging: MINIMAL      GOALS:   Short Term Goals:  Pt will increase R UE  strength to 18 lbs to complete IADLs   Pt will increase R FMC by being able to don 5 pegs in to 9 hole peg board in 1 minute and 40 seconds on 9 hole peg to complete ADLs and IADLs GOAL MET   Pt will increase  R UE strength to complete 8/14 of hand section of fugyl benavides for tabletop tasks NOT ASSESSED TODAY  Pt will increase  R UE strength to complete 4/6 of coordination section of fugyl benavides for tabletop tasks NOT ASSESSED TODAY        Long Term Goals: 8-12 weeks  Pt will increase R UE  strength to 25 lbs to complete IADLs   Pt will increase R FMC by being able to don all 9 pegs in to 9 hole peg board in 3 minutes on 9 hole peg to complete ADLs and IADLs GOAL MET   Pt will increase  R UE strength to complete 10/14 of hand section of fugyl benavides for tabletop tasksNOT ASSESSED TODAY  Pt will increase  R UE strength to complete 5/6 of coordination section of fugyl benavides for tabletop tasksNOT ASSESSED TODAY      OTHER PLANNED THERAPY INTERVENTIONS:   Supine, seated, and in stance neuro re-ed  Tricep AG  NMES/FES  FMC/prehension  Timed Trials  Manual tx  Hand to target  Sensory re-ed  Seated functional reach: crossing midline  Supine place and hold  WBearing strategies   Closed chain activities  Open chain activities  Internal and external memory aides        Objective Measurement Tracker:    IE (2/15/24) 1st Re-eval:   (3/19/24) 2nd Re-eval: 3rd Re-eval:     MoCA 24/30 NOT TODAY/30 /30 /30 /30   TM Test A        TM Test B        CMT Immediate: 3/20, 0 confabulations  Delayed: 3/20, 0 confabulations NOT TODAY      Nine Hole Peg Test R hand: 3 pegs in 1  minute and 48.49 seconds   L hand: 35.14 seconds   R hand: all 9 pegs in and out in 1 minute and 36 seconds    L hand: 39.59 seconds       Functional Dexterity Test NOT TODAY R UE: 2 minutes and 6 seconds to complete 8 with compensatory strategies including the board and 1 peg drop  L UE: 45 seconds        Strength R: 15lb,   L: 100lb R: 15lbs  L: 100lbs       Lateral Pinch Strength NOT TODAY       2 Point Pinch Strength NOT TODAY           3 Point Pinch Strength NOT TODAY           Manual Muscle Testing             Fugl Harrison UE: 36/36  Wrist: 10/10  Hand: 6/14  Coordination: 2/6 NOT TODAY

## 2024-03-21 ENCOUNTER — APPOINTMENT (OUTPATIENT)
Facility: CLINIC | Age: 39
End: 2024-03-21
Payer: MEDICARE

## 2024-03-22 ENCOUNTER — OFFICE VISIT (OUTPATIENT)
Facility: CLINIC | Age: 39
End: 2024-03-22
Payer: MEDICARE

## 2024-03-22 DIAGNOSIS — I63.89 CEREBROVASCULAR ACCIDENT (CVA) DUE TO OTHER MECHANISM (HCC): Primary | ICD-10-CM

## 2024-03-22 PROCEDURE — 97530 THERAPEUTIC ACTIVITIES: CPT

## 2024-03-22 NOTE — PROGRESS NOTES
"Daily Note     Today's date: 3/22/2024  Patient name: Mateus Mckeon  : 1985  MRN: [id number]  Referring provider: Dania Sher DO  Dx:   Encounter Diagnosis     ICD-10-CM    1. Cerebrovascular accident (CVA) due to other mechanism (HCC)  I63.89           Start Time: 0804  Stop Time: 0845  Total time in clinic (min): 41 minutes      PLAN OF CARE START: 24  PLAN OF CARE END: 2024  FREQUENCY: 2 times a week  PRECAUTIONS: Renal failure, actively going through dialysis; type 1 diabetes; HTN; SAH; seizure (last one was 2019)      Subjective: Pt reports he had a hard time sleeping last night due to his R UE feeling like it is \"dead\" with pain. He says he was proud of himself for being able to doff his hat. \"I feel like the function is coming back with the swelling going down.\"  PLEASE COMPLETE MAS NEXT SESSION     Objective: See treatment diary below      TA:   FUGYL JOHNS ASSESSMENT OF MOTOR RECOVERY AFTER STROKE:  R UE:  UPPER EXTREMITY SEATED: 34/36 (previously: 3636) SLIGHT DECREASE  WRIST: 7/10 due to pain today (previously: 10/10) SLIGHT DECREASE  HAND: 10/14 (previously: ) IMPROVED   COORDINATION/SPEED: 3/6 (previously: ) SLIGHT IMPROVEMENT   OVERALL SCORE: 54/66 (previously: 54/66) OVERALL MAINTENANCE OF OVERALL SCORE      (Clinical change= 5 points, severe impairment <19, and mild impairment is >50)       Functional Cognition:  Highest level of education: some college    [place] Cognitive Assessment Version 8.2 (MoCA V8.2)  Visuospatial/executive functionin5  Naming:  3/3  Memory: 1st trial:  , 2nd trial:    Attention/concentration: 2/2  List of letters:   Serial Seven Subtraction:  33 w/ 0 errors  Language/sentence repetition: 2/2  (previously: 2)  Language Fluency: 0/1 (previously:   Abstract/Correlational Thinkin/2 (previously: 2)  Delayed Recall:  (previously: 3/5)  Orientation:     Memory Index Score: 13/15 (previously: 11/15)  MoCA V1 " 8.2 Raw Score: 26/3 (previously: 24/30), MIS:  13/15, indicative of no neurocognitive impairments.    MoCA Scoring        Normal: 26+         Mild Cognitive Impairment: 18-25          Moderate Cognitive Impairment: 10-17         Severe Cognitive Impairment: <10    Trail making Part A and Part B:   Part A: 1 minute and 2 seconds independently (previously:40.45 seconds independently) DECLINE  Part B: 1 minute and 53.92 seconds independently (previously:1 minute and 47.62 with 0 VCs for recall of instructions, problem solving) DECLINE  Indicating deficits: Part A > 24.40 seconds and Part B > 50.68 seconds      Contextual Memory Test:  Immediate:10/20 with 0 confabulation  (previously: 3/20, 0 confabulations) IMPROVED  Delayed: 10/20  (previously: 3/20, 0 confabulations) IMPROVED       Assessment: Tolerated treatment well. Pt demonstrated overall improvements with immediate and delayed visual memory, functional cognition, and hand functioning as evidenced by improved CMT, MoCA and FM scores. Pt did demonstrate a decline in sustained and divided attention as evidenced by decreased Trail Making A and B scores although it is of note that the pt presented to session reporting pain and weakness in R UE today due to unknown cause. Pt would benefit from continued skilled occupational therapy services to improve fine motor coordination, dexterity, UE function, and functional cognition. Patient demonstrated fatigue post treatment and would benefit from continued OT      Plan: Continue per plan of care.     SHORT TERM GOALS ADDED 2/20/24:  Pt will increase sustained attention by completing trail making part A in 35 seconds to complete IADLs NOT MET  Pt will increase divided attention by completing trail making part B in 1 minute 30 seconds to complete IADLs NOT MET  Pt will increase delayed recall and recall 4 /5 items on MOCA to complete IADLs GOAL MET    LONG TERM GOALS ADDED 2/20/24:  Pt will increase sustained attention by  completing trail making part A in 38 seconds to complete IADLs  Pt will increase divided attention by completing trail making part B in 1 minute 10 seconds to complete IADLs  Pt will increase delayed recall and recall 5/5 items on MOCA to complete IADLs  Resume right  hand dominance to refined functional assist with all activities of daily living

## 2024-03-22 NOTE — PROGRESS NOTES
Left voice message to pt to notify her that antibiotics have been send to her pharmacy.    To be discussed at follow up tomorrow

## 2024-03-25 ENCOUNTER — OFFICE VISIT (OUTPATIENT)
Facility: CLINIC | Age: 39
End: 2024-03-25
Payer: MEDICARE

## 2024-03-25 DIAGNOSIS — I63.89 CEREBROVASCULAR ACCIDENT (CVA) DUE TO OTHER MECHANISM (HCC): Primary | ICD-10-CM

## 2024-03-25 PROCEDURE — 97112 NEUROMUSCULAR REEDUCATION: CPT

## 2024-03-25 NOTE — PROGRESS NOTES
Daily Note     Today's date: 3/25/2024  Patient name: Mateus Mckeon  : 1985  MRN: [id number]  Referring provider: Dania Sher DO  Dx:   Encounter Diagnosis     ICD-10-CM    1. Cerebrovascular accident (CVA) due to other mechanism (HCC)  I63.89             Start Time: 0800  Stop Time: 0845  Total time in clinic (min): 45 minutes      PLAN OF CARE START: 24  PLAN OF CARE END: 2024  FREQUENCY: 2 times a week  PRECAUTIONS: Renal failure, actively going through dialysis; type 1 diabetes; HTN; SAH; seizure (last one was 2019)      Subjective: Pt reports RUE numbness and dull pain with functional use of hand. Educated pt on stroke recovery and passive stretching for edema management.     PLEASE COMPLETE MAS NEXT SESSION     Objective: See treatment diary below      NMR:  Completed lateral pushups x20 reps on dynadisc with theragun for vibration weightbearing on RUE focusing on proprioceptive feedback and reducing spasticity    Completed seated needle threads with yellow theraband weightbearing on RUE for proprioceptive feedback     Completed juxtasizer x5 reps focusing on RUE ROM     Completed large mushroom peg activity reaching to pt's R side for pegs and crossing midline for functional reaching. Pt donned pegs, then stacked pegs forming a second row and used green resistance pin to place cotton balls on top. Pt experienced fatigue throughout activity. Activity focused on grasp/release, FMC, pinch strength, motor control and coordination.           Assessment: Tolerated treatment well. Pt demonstrated fatigue throughout FMC activity with peg board. Pt demonstrated decreased grasp/release with large pegs and decreased motor control/coordination with resistance pin and cotton balls. Pt would benefit from continued skilled occupational therapy services to improve fine motor coordination, dexterity, UE function, and functional cognition.        Plan: Continue per plan of care.     SHORT TERM  GOALS ADDED 2/20/24:  Pt will increase sustained attention by completing trail making part A in 35 seconds to complete IADLs NOT MET  Pt will increase divided attention by completing trail making part B in 1 minute 30 seconds to complete IADLs NOT MET  Pt will increase delayed recall and recall 4 /5 items on MOCA to complete IADLs GOAL MET    LONG TERM GOALS ADDED 2/20/24:  Pt will increase sustained attention by completing trail making part A in 38 seconds to complete IADLs  Pt will increase divided attention by completing trail making part B in 1 minute 10 seconds to complete IADLs  Pt will increase delayed recall and recall 5/5 items on MOCA to complete IADLs  Resume right  hand dominance to refined functional assist with all activities of daily living

## 2024-03-26 ENCOUNTER — APPOINTMENT (OUTPATIENT)
Facility: CLINIC | Age: 39
End: 2024-03-26
Payer: MEDICARE

## 2024-03-28 ENCOUNTER — OFFICE VISIT (OUTPATIENT)
Facility: CLINIC | Age: 39
End: 2024-03-28
Payer: MEDICARE

## 2024-03-28 DIAGNOSIS — I63.89 CEREBROVASCULAR ACCIDENT (CVA) DUE TO OTHER MECHANISM (HCC): Primary | ICD-10-CM

## 2024-03-28 DIAGNOSIS — I63.9 ACUTE CVA (CEREBROVASCULAR ACCIDENT) (HCC): Primary | ICD-10-CM

## 2024-03-28 PROCEDURE — 97530 THERAPEUTIC ACTIVITIES: CPT | Performed by: OCCUPATIONAL THERAPIST

## 2024-03-28 PROCEDURE — 97112 NEUROMUSCULAR REEDUCATION: CPT | Performed by: PHYSICAL THERAPIST

## 2024-03-28 PROCEDURE — 97112 NEUROMUSCULAR REEDUCATION: CPT | Performed by: OCCUPATIONAL THERAPIST

## 2024-03-28 NOTE — PROGRESS NOTES
Daily Note     Today's date: 3/28/2024  Patient name: Mateus Mckeon  : 1985  MRN: [id number]  Referring provider: Dania Sher DO  Dx:   Encounter Diagnosis     ICD-10-CM    1. Cerebrovascular accident (CVA) due to other mechanism (HCC)  I63.89             Start Time: 08            PLAN OF CARE START: 24  PLAN OF CARE END: 2024  FREQUENCY: 2 times a week  PRECAUTIONS: Renal failure, actively going through dialysis; type 1 diabetes; HTN; SAH; seizure (last one was 2019)      Subjective: Pt reports R wrist pain with juxtaciser and flexbar exercises. Pt reports normal BP is around 150/80.    PLEASE COMPLETE MAS NEXT SESSION     Objective: See treatment diary below      NMR:  -Completed juxtasizer x5 reps focusing on RUE coordination and AROM   -Completed sock  and placement in bucket at arm's length using tongs with 2lb wrist weight to RUE focusing on motor control and coordination for reaching and gross grasp, proximal and grasp strengthening.  -Completed magnetic disk  and placement over dowel at arm's length with 2lb wrist weight x6 rounds focusing on RUE gross motor coordination and motor control, FMC for ADLs. Wrist weight removed jail through activity d/t fatigue.  TA:  -Toward end of session pt reported severe nausea. Vital signs checked and BP was 109/68 (automatic on RUE), HR 75, O2 saturation 97%. With rest break nausea improved. Treating PT made aware.      Assessment: Pt tolerated activities fairly with fatigue throughout session. Demonstrated poor RUE proximal muscular endurance. Benefited from verbal cues to minimize compensatory trunk movements and focus on elbow extension during reaching activities. Pt would benefit from continued skilled occupational therapy services to improve fine motor coordination, dexterity, stregnth, UE function, and functional cognition.        Plan: Continue per plan of care.     SHORT TERM GOALS ADDED 24:  Pt will increase  sustained attention by completing trail making part A in 35 seconds to complete IADLs NOT MET  Pt will increase divided attention by completing trail making part B in 1 minute 30 seconds to complete IADLs NOT MET  Pt will increase delayed recall and recall 4 /5 items on MOCA to complete IADLs GOAL MET    LONG TERM GOALS ADDED 2/20/24:  Pt will increase sustained attention by completing trail making part A in 38 seconds to complete IADLs  Pt will increase divided attention by completing trail making part B in 1 minute 10 seconds to complete IADLs  Pt will increase delayed recall and recall 5/5 items on MOCA to complete IADLs  Resume right  hand dominance to refined functional assist with all activities of daily living

## 2024-03-28 NOTE — PROGRESS NOTES
"Daily Note     Today's date: 3/28/2024  Patient name: Mateus Mckeon  : 1985  MRN: [id number]  Referring provider: Dania Sher DO  Dx:   Encounter Diagnosis     ICD-10-CM    1. Acute CVA (cerebrovascular accident) (HCC)  I63.9               POC expires Auth Status Total   Visits  Start date  Expiration date PT/OT + Visit Limit? Co-Insurance   24     bomn                                            Visit/Unit Tracking  AUTH Status:  Date                 Authed:  Used                Remaining                               Subjective: Pt reports he was nauseous during OT, but denies nausea now. OT checked his BP and it was 109/70 which is on the low side for him. Blood sugar 154.       Objective: See treatment diary below  *FLUID RESTRICTION- do not offer water*    HR 82 bpm, SpO2 97%     SOLO STEP:  - FWD/BCK walking, no AD: 10 feet x 10 reps  - FWD stepping over (6) 9\" hurdles: 4 sets, 0 UE  - LAT stepping over (6) 9\" hurdles, 4 cycles down/back, no UE   - A-P sway x 20 reps  - Step up to 6\" step, 20 reps, 2 sets  - Feet close EC x 30 seconds      Assessment: Pt tolerated treatment well today. No UE support used for any exercises. Occasional posterior LOB during step ups. Improved stability standing with feet close and eyes closed. Rest breaks taken as needed due to fatigue. Pt would benefit from continued PT to improve balance, endurance, and reduce fall risk.       Plan: Continue per plan of care.  Balance &  gait.       Outcome Measures Initial Eval  24 Re-eval  24 & DN 3/1/2024         5xSTS 31.25 sec 52.20 sec no UE's    (1 LOB)       TUG 16.3 sec with rollator    23.6 sec no AD 20.68 sec with rollator    18.44 sec no AD       10 meter  Next visit>       MEDRANO  42/56       FGA  Not performed at this time        6MWT 900 ft 710 ft       mCTSIB  FTEO firm  FTEC firm  FTEO foam  FTEC foam     30 sec  30 sec  30 sec, +  1 sec        ABC  65%               "

## 2024-03-29 ENCOUNTER — TELEPHONE (OUTPATIENT)
Dept: NEUROLOGY | Facility: CLINIC | Age: 39
End: 2024-03-29

## 2024-03-29 NOTE — TELEPHONE ENCOUNTER
I called patient Lvm to call back to confirm upcoming appointment with provider.    No medications are noted for rf?

## 2024-04-02 ENCOUNTER — OFFICE VISIT (OUTPATIENT)
Facility: CLINIC | Age: 39
End: 2024-04-02
Payer: MEDICARE

## 2024-04-02 ENCOUNTER — EVALUATION (OUTPATIENT)
Facility: CLINIC | Age: 39
End: 2024-04-02
Payer: MEDICARE

## 2024-04-02 DIAGNOSIS — I63.89 CEREBROVASCULAR ACCIDENT (CVA) DUE TO OTHER MECHANISM (HCC): Primary | ICD-10-CM

## 2024-04-02 DIAGNOSIS — I63.9 ACUTE CVA (CEREBROVASCULAR ACCIDENT) (HCC): Primary | ICD-10-CM

## 2024-04-02 PROCEDURE — 97530 THERAPEUTIC ACTIVITIES: CPT

## 2024-04-02 NOTE — PROGRESS NOTES
PT Re-Evaluation          POC expires Auth Status Total   Visits  Start date  Expiration date PT/OT + Visit Limit? Co-Insurance   24                                                 Visit/Unit Tracking  AUTH Status:  Date                 Authed:  Used                Remaining                           Today's date: 2024  Patient name: Mateus Mckeon  : 1985  MRN: [id number]  Referring provider: Dania Sher DO  Dx:   Encounter Diagnosis     ICD-10-CM    1. Acute CVA (cerebrovascular accident) (HCC)  I63.9             Assessment  Assessment details: Patient is a 38 y.o. Male who presents to skilled outpatient PT with s/p CVA. He has PMH of ESRD on dialysis and has also had 2 prior CVA's. Overall LE strength is grossly 5/5 but he presents with deficits in sensation, coordination and balance. He demonstrated improvements in the following outcome measures since last reassessment: 5 x STS, TUG (without AD), Callejas, and 6 MWT, likely indicating overall gains in functional LE strength, safe mobility, static balance, and cardiovascular endurance, respectively. Additionally, he was able to complete entirety of 6 MWT without an AD this date. Despite these overall improvements, per cutoff scores for the 5 x STS and Callejas he is classified as HIGH risk for falls. Patient ambulated without AD for length of session with following gait deficits: WBOS with inconsistent step length, ataxic gait pattern. His PLOF was modified independent with SPC. Plan to focus on HIGT and HIIT as tolerated, along with balance based exercises. He has met 3 short term goals at this time. Patient will continue to benefit from skilled PT to achieve goals below and maximize function post CVA.          Impairments: Abnormal coordination, Abnormal gait, Abnormal muscle tone, Abnormal or restricted ROM, Activity intolerance, Impaired balance, Impaired physical  strength, Lacks appropriate HEP, Poor posture, Poor body mechanics, Pain with function, Safety issue, Weight-bearing intolerance, Abnormal movement, Difficulty understanding, Abnormal muscle firing  Understanding of Dx/Px/POC: Good  Prognosis: Good      Patient verbalized understanding of POC.         Please contact me if you have any questions or recommendations. Thank you for the referral and the opportunity to share in Mateus Mckeon's care.        Plan  Plan details: PT 2-3x/week   Patient would benefit from: Skilled PT  Planned modality interventions: Biofeedback, Cryotherapy, TENS, Thermotherapy  Planned therapy interventions: Abdominal trunk stabilization, ADL training, Balance, Balance/WB training, Breathing training, Body mechanics training, Coordination, Functional ROM exercises, Gait training, HEP, Joint Mobilization, Manual Therapy, Ma taping, Motor coordination training, Neuromuscular re-education, Patient education, Postural training, Strengthening, Stretching, Therapeutic activities, Therapeutic exercises, Therapeutic training, Transfer training, Activity modification, Work reintegration  Frequency: 2-3x/wk  Duration in weeks: 12 weeks  Plan of Care beginning date: 2/20/2024  Plan of Care expiration date: 12 weeks - 5/14/2024  Treatment plan discussed with: Patient         Goals  Short Term Goals (4 weeks):    Patient will improve 6MWT by 100ft w/LRAD demonstrating significant improvements in endurance  w/in 4 weeks - NOT MET  Patient will be independent with HEP within 4 weeks - ONGOING  Patient will be able to ambulate household distances (100ft) or greater with LRAD  within 4 weeks - ONGOING  Patient will demonstrated improved LE strength and endurance by improved 5xSTS to 30 sec or less within 4 weeks - MET  Patient will perform Callejas to further assess static balance and fall risk. - MET  Patient will perform 10 meter walk test to further assess gait speed and fall risk. - MET  Patient  will improve ABC to 80% to demonstrate increased balance confidence and reduced fall risk. - NOT MET      Long Term Goals (12 weeks):  - Patient will be independent in a comprehensive home exercise program  - Patient will improve gait speed to 1.0 m/s to improve safety with community ambulation  - Patient will improve MEDRANO by 6 points to facilitate return to safe independent ambulation  - Patient will improve scoring on FGA by 4 points to progress safety with dynamic tasks  - Patient will improve 6 Minute Walk Test score by 190 feet to promote improved cardiovascular endurance  - Patient will report going on walks at least 3 days per week to promote independence and improved cardiovascular endurance  - Patient will be able to ascend/descend stairs reciprocally with 1 UE assist to promote independence and safety with ADLs  - Patient will demonstrated improved LE strength and endurance by improved 5xSTS to 13 sec or less        Cut off score    All date taken from [facility] Neuro Section or Rehab Measures      Medrano:  Siva et al., 2018  MDC: 2.7 pts    She et al., 2011  Cut-off score: 45/56    Chronic CVA  < 44/56 high risk for falls (Siva et al., 2018)  < 47.5/56 slow walker status (Jus rodríguez al., 2011) 5xSTS: Jean Carlos 2010  MDC: 2.3 sec  Age Norms:  60-69: 11.1 sec  70-79: 12.6 sec  80-89: 14.8 sec    Yamileth 2010, Chronic Stroke  Chronic CVA: 12 sec   TUG  Nubia rodríguez al., 2005  MDC: 2.9 sec    Cut off score:  >13.5 sec community dwelling adults  >32.2 frail elderly  <20 I for basic transfers  >30 dependent on transfers 10 Meter Walk Test:   Talia et al., 2006  Small meaningful change: 0.06 m/s  Substantial meaningful change: 0.14 m/s  MCID: 0.16 m/s    < 0.4 m/s household ambulators  0.4 - 0.8 m/s limited community ambulators  > 0.8 m/s community ambulators   FGA: Deepika et al., 2010  MCID: 4.2 pts  Geriatrics/community < 22/30 fall risk  Geriatrics/community < 20/30 unexplained falls DGI  MDC: vestibular -  "4 pts  MDC: geriatric/community - 3 pts  Falls risk <19/24   6 Minute Walk Test  Talia et al., 2006  MDC: 60.98 m (200.01 ft)    Verónica Norwood, & Ally, 2012  MCID: 34.4 m    Age Norms  60-69: M - 1876 ft   F - 1765 ft  70-79: M - 1729 ft   F - 1545 ft  80-89: M - 1368 ft   F - 1286 ft Modified Joel  0: No increase in tone  1: Catch and release or min resistance at end range  1+: Catch f/b min resistance throughout remainder (< half ROM)  2: Easily moved, but more marked tone throughout most ROM  3: Significant tone, PROM difficult  4: Rigid   MiniBest: Glenda et al., 2013  CVA < 17.5 fall risk Pass (Acute CVA)  MDC: 1.8 points (acute), 3.2 points (chronic)         Subjective    History of Present Illness  Mechanism of injury: Pt had CVA end of January (3rd stroke). He also has ESRD and goes to dialysis 3 days/week. He mostly uses rollator but at home he furniture walks. His parents need to assist him more now than prior to stroke. He likes to spend time with nieces and nephews and attend their sporting events.     Updated (4/2/2024): Patient reports overall satisfaction with PT services thus far, feels his stamina has gotten better. He wants to continue to work on this so that he has \"the energy to do things.\"    Primary AD: rollator (PLOF he mostly used SPC and only used rollator for long distances like going to nephew's soccer game)   Assist level at home: lives with parents who are assisting with ADL's and IADL's, but he is transferring and walking independently.   WC usage: none  PLOF: using SPC mostly, still required some assist from parents for ADL's and IADL's   Patient goals: to walk with SPC again, work on balance, build up walking endurance     Pain  Pain in both feet due to neuropathy     Social Support  Steps to enter house: 2 NIRU  Stairs in house: full flight to 2nd floor (able to perform independently)    Lives in: 2 story home   Lives with: parents    Employment status: disabled   Hand dominance: " right    Treatments  Previous treatment: PT [address]  Current treatment: PT, OT  Diagnostic Testing:       Objective     Vitals  - HR: 79 bpm  - BP: 140/70 mmHg   - SPO2: 97%    LE MMT  - R Hip Flexion: 5/5  - L Hip Flexion: 5/5  - R Hip Extension: 5/5  - L Hip Extension: 5/5  - R Hip Abduction: 5/5  - L Hip abduction: 5/5  - R Hip adduction: 5/5  - L Hip adduction: 5/5  - R Knee Extension: 5/5 - L Knee Extension: 5/5  - R Knee Flexion: 5/5  - L Knee Flexion: 5/5  - R Ankle DF: 5/5  - L Ankle DF: 5/5  - R Ankle PF: 5/5  - L Ankle PF: 5/5    Sensation  - Light touch: neuropathy both feet up to mid calf   - Temperature: diminished     Coordination  - Alternating Toe Taps: impaired  - Heel to shin: impaired     Clonus  - L: Yes  - R: Yes  - Fatiguing: Yes  - Number of beats: 1-2 beats    Vision  - Glasses: Yes  - Abnormalities prior to CVA: Yes, retinopathy (gets shots every 3 months)  - Abnormalities post CVA: No,     Neglect  - R sided: No  - L sided: No    Gait  Wide COLBY, ataxic, decreased step length      Outcome Measures Initial Eval  1/30/24 Re-eval  2/20/24 PN  4/2/2024      5xSTS 31.25 sec 52.20 sec no UE's    (1 LOB) 19.91 sec no UE      TUG 16.3 sec with rollator    23.6 sec no AD 20.68 sec with rollator    18.44 sec no AD 23.41 sec with rollator    12.92 sec no AD      10 meter  Next visit> 0.88 m/s      MEDRANO  Next visit> 44/56      FGA 12/30 Next visit> 12/30      6MWT 900 ft 710 ft 760 ft no AD      mCTSIB  FTEO firm  FTEC firm  FTEO foam  FTEC foam   Next visit> 30 sec  30 sec  5.5 sec  defer      ABC  65% 43.75%           Precautions: Check BP's RUE only (fistula LUE)   Past Medical History:   Diagnosis Date    Acute kidney injury (HCC)     Ambulates with cane     Anuria     Anxiety     Cellulitis of right elbow 03/31/2021    Chronic kidney disease     Depression     Diabetes mellitus (HCC)     Diarrhea     Emesis 10/24/2020    End stage renal disease (HCC) 02/11/2018    Formatting of this note might  be different from the original. Last Assessment & Plan:  Secondary to DM.  On nightly PD.  Followed by [facility].  Patient considering transplant for kidney and pancreas through [facility] Formatting of this note might be different from the original. Last Assessment & Plan:  Formatting of this note might be different from the original. 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    Esophagitis 07/21/2015    Falls     Gastroparesis     GERD (gastroesophageal reflux disease)     History of shingles 2010    History of transfusion 02/2018    no adverse reaction    Hyperlipidemia     Hyperphosphatemia     Hypertension     Hypoglycemia 07/15/2022    Itching     Mastoiditis of right side 07/15/2022    Muscle weakness     general unsteadiness    Obesity (BMI 30.0-34.9) 09/09/2019    Orthostatic hypotension 10/25/2020    Peripheral polyneuropathy 11/20/2019    PONV (postoperative nausea and vomiting) 01/26/2018    Protein-calorie malnutrition (HCC) 11/23/2020    Recurrent peritonitis (HCC) due to peritoneal dialysis catheter 07/31/2020    Retinopathy     Seizures (HCC)     early 2020 - one time    Skin abnormality     some dime size areas where skin was scratched from itching    Spontaneous bacterial peritonitis (HCC) 10/19/2020    Squamous cell skin cancer     left temple    Stroke (HCC)     x2 - off balance/no driving/fatigue    Swelling of both lower extremities     Traumatic onycholysis 07/21/2022    Vomiting     Wears glasses

## 2024-04-02 NOTE — PROGRESS NOTES
"Daily Note     Today's date: 2024  Patient name: Mateus Mckeon  : 1985  MRN: [id number]  Referring provider: Dania Sher DO  Dx:   Encounter Diagnosis     ICD-10-CM    1. Cerebrovascular accident (CVA) due to other mechanism (HCC)  I63.89               Start Time: 0803  Stop Time: 0845  Total time in clinic (min): 42 minutes      PLAN OF CARE START: 24  PLAN OF CARE END: 2024  FREQUENCY: 2 times a week  PRECAUTIONS: Renal failure, actively going through dialysis; type 1 diabetes; HTN; SAH; seizure (last one was 2019)      Subjective: Pt reports R wrist pain when he \"works it.\" Pt thinks the swelling in his had has worsened.     PLEASE COMPLETE MAS NEXT SESSION     Objective: See treatment diary below      TA:  -completed 2x15 blue theraband rows with cone to improve proximal stability  -completed x20 5lb dumb bell bicep curls to improve proximal stability and bicep strength  -completed pronatoin and supination with flexbar and blaze pods x15 then added 1lb weight to end of flexbar x20 then x40 to improve forearm strength and coordination.   -competed multimatrix figure cube transfer with red resisted clip and visual closure card. Pt reported difficulty with red clip therefore task downgraded to yellow resisted clip. Pt asked to name cities or states that begin with letter on cube. Focused on R UE coordination, pinch strength, sustained pinch, divided attention, and recall. Pt demo increased muscle twitch following 9 reps and yellow clip removed. Pt completed remainder of task with lateral and 2pt pinch.       Assessment: Pt tolerated activities well. Demonstrated poor RUE proximal muscular endurance although improving. Pt given blue theraband and advised on exercises to complete with band. Pt required min cues to improve seated posture. Pt would benefit from continued skilled occupational therapy services to improve fine motor coordination, dexterity, stregnth, UE function, and " functional cognition.        Plan: Continue per plan of care.     SHORT TERM GOALS ADDED 2/20/24:  Pt will increase sustained attention by completing trail making part A in 35 seconds to complete IADLs NOT MET  Pt will increase divided attention by completing trail making part B in 1 minute 30 seconds to complete IADLs NOT MET  Pt will increase delayed recall and recall 4 /5 items on MOCA to complete IADLs GOAL MET    LONG TERM GOALS ADDED 2/20/24:  Pt will increase sustained attention by completing trail making part A in 38 seconds to complete IADLs  Pt will increase divided attention by completing trail making part B in 1 minute 10 seconds to complete IADLs  Pt will increase delayed recall and recall 5/5 items on MOCA to complete IADLs  Resume right  hand dominance to refined functional assist with all activities of daily living

## 2024-04-03 ENCOUNTER — OFFICE VISIT (OUTPATIENT)
Dept: NEUROSURGERY | Facility: CLINIC | Age: 39
End: 2024-04-03
Payer: MEDICARE

## 2024-04-03 ENCOUNTER — OFFICE VISIT (OUTPATIENT)
Dept: NEUROLOGY | Facility: CLINIC | Age: 39
End: 2024-04-03
Payer: MEDICARE

## 2024-04-03 ENCOUNTER — TELEPHONE (OUTPATIENT)
Dept: PSYCHIATRY | Facility: CLINIC | Age: 39
End: 2024-04-03

## 2024-04-03 VITALS
TEMPERATURE: 98.7 F | WEIGHT: 202.2 LBS | BODY MASS INDEX: 33.69 KG/M2 | SYSTOLIC BLOOD PRESSURE: 116 MMHG | DIASTOLIC BLOOD PRESSURE: 64 MMHG | HEIGHT: 65 IN | OXYGEN SATURATION: 96 % | RESPIRATION RATE: 17 BRPM | HEART RATE: 75 BPM

## 2024-04-03 VITALS
DIASTOLIC BLOOD PRESSURE: 76 MMHG | OXYGEN SATURATION: 97 % | HEART RATE: 70 BPM | BODY MASS INDEX: 33.71 KG/M2 | SYSTOLIC BLOOD PRESSURE: 118 MMHG | TEMPERATURE: 98.2 F | WEIGHT: 202.6 LBS

## 2024-04-03 DIAGNOSIS — Z99.2 ESRD ON HEMODIALYSIS (HCC): Chronic | ICD-10-CM

## 2024-04-03 DIAGNOSIS — Z86.73 HISTORY OF STROKE: ICD-10-CM

## 2024-04-03 DIAGNOSIS — N18.6 ESRD ON HEMODIALYSIS (HCC): Chronic | ICD-10-CM

## 2024-04-03 DIAGNOSIS — R20.0 RIGHT UPPER EXTREMITY NUMBNESS: ICD-10-CM

## 2024-04-03 DIAGNOSIS — Z15.89 HOMOZYGOUS MTHFR MUTATION C677T: Primary | Chronic | ICD-10-CM

## 2024-04-03 DIAGNOSIS — I63.9 CEREBROVASCULAR ACCIDENT (CVA) OF LEFT PONTINE STRUCTURE (HCC): Chronic | ICD-10-CM

## 2024-04-03 DIAGNOSIS — F33.0 MILD EPISODE OF RECURRENT MAJOR DEPRESSIVE DISORDER (HCC): ICD-10-CM

## 2024-04-03 DIAGNOSIS — I60.8 NON-ANEURYSMAL PERIMESENCEPHALIC SUBARACHNOID HEMORRHAGE (HCC): ICD-10-CM

## 2024-04-03 DIAGNOSIS — R56.9 PROVOKED SEIZURE (HCC): ICD-10-CM

## 2024-04-03 DIAGNOSIS — G46.4 CEREBELLAR STROKE SYNDROME: Primary | Chronic | ICD-10-CM

## 2024-04-03 DIAGNOSIS — F41.1 GENERALIZED ANXIETY DISORDER: ICD-10-CM

## 2024-04-03 DIAGNOSIS — D68.52 HETEROZYGOUS FOR PROTHROMBIN G20210A MUTATION (HCC): ICD-10-CM

## 2024-04-03 DIAGNOSIS — E10.42 DIABETIC POLYNEUROPATHY ASSOCIATED WITH TYPE 1 DIABETES MELLITUS (HCC): ICD-10-CM

## 2024-04-03 DIAGNOSIS — M25.421 SWELLING OF JOINT OF UPPER ARM, RIGHT: ICD-10-CM

## 2024-04-03 PROCEDURE — 99215 OFFICE O/P EST HI 40 MIN: CPT | Performed by: NEUROLOGICAL SURGERY

## 2024-04-03 PROCEDURE — 99215 OFFICE O/P EST HI 40 MIN: CPT | Performed by: PSYCHIATRY & NEUROLOGY

## 2024-04-03 RX ORDER — GABAPENTIN 100 MG/1
CAPSULE ORAL
Qty: 90 CAPSULE | Refills: 3 | Status: SHIPPED | OUTPATIENT
Start: 2024-04-03

## 2024-04-03 RX ORDER — TRAZODONE HYDROCHLORIDE 50 MG/1
25 TABLET ORAL
Qty: 15 TABLET | Refills: 0 | Status: SHIPPED | OUTPATIENT
Start: 2024-04-03 | End: 2024-05-03

## 2024-04-03 NOTE — TELEPHONE ENCOUNTER
Medication Refill Request     Name of Medication DESYRELL  Dose/Frequency 50mg/ take 0.5 tablets at bedtime for sleep  Quantity 30  Verified pharmacy   [x]  Verified ordering Provider   [x]  Does patient have enough for the next 3 days? Yes [] No [x]  Does patient have a follow-up appointment scheduled? Yes [x] No []   If so when is appointment: 7/18 @ 9:30

## 2024-04-03 NOTE — TELEPHONE ENCOUNTER
Patient's parent/guardian contacted the office to schedule a follow up visit with provider. Patient is now scheduled for 7/18  at 9:30 virtually.

## 2024-04-03 NOTE — PROGRESS NOTES
Patient Id: Mateus Mckeon is a 38 y.o. 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  He underwent 2 formal angiogram as well as delayed noninvasive imaging without any evidence of radiographic source or vascular lesion.  I do not believe that this requires any further evaluation or workup at this time.  I explained that historically these are single events without increased risk of recurring.  With his history of dialysis and variable blood pressures he may be at increased risk for episodes such as these which may explain a portion of its etiology.    2.  Stroke.  This had significant impact in his right upper extremity.  He has had significant improvement with therapies but remains limited in function and coordination with the right upper extremity.  He also has significant neuropathic pain.  I would be interested in increasing his Neurontin if this is cleared with his PCP.  He currently takes 200 mg nightly.  Increasing this to 3 times daily dosing may help this discomfort.  He is seeing the stroke neurologist today and remains on aspirin 81.    3.  Right upper extremity swelling  Recently completed an arterial Doppler of his right upper extremity.  There is no evidence of arterial insufficiency, however, I would like to also rule out DVT of his right upper extremity.  I will send him for venous Dopplers today.  I have a low suspicion of this being  positive, however, given the swelling and his pain I believe it is a reversible etiology that should be investigated.  Otherwise this may be due to neuropathy/post stroke pain.    4.  Transplant candidacy.  Family specifically questions and addressed his candidacy for renal transplant in the context of his prior intracranial hemorrhage.  Once again, given the fact that this is benign perimesencephalic hemorrhage I do not believe he is at any increased risk of repeated hemorrhage (aside from a theoretical risk due to his dialysis).  I do not see any neurologic or neurovascular reason to preclude him from transplant candidacy.    I have spent a total time of 45 minutes on 04/03/24 in caring for this patient including Diagnostic results, Prognosis, Instructions for management, Patient and family education, Importance of tx compliance, Risk factor reductions, Impressions, Counseling / Coordination of care, Documenting in the medical record, Reviewing / ordering tests, medicine, procedures  , Obtaining or reviewing history  , and Communicating with other healthcare professionals .     Chief Complaint: Follow-up (6week hospital follow up)        HPI:   This is a very pleasant 38-year-old gentleman with history of end-stage renal disease, hypertension, stroke, and who presented to the hospital in the beginning of January with headache after dialysis.  Evaluation demonstrated subarachnoid hemorrhage consistent with potential aneurysmal subarachnoid hemorrhage versus benign perimesencephalic hemorrhage.  Ultimately he underwent 2 formal angiograms as well as a multitude of noninvasive imaging studies.  No vascular etiology was found for his subarachnoid hemorrhage.    During his hospitalization, and after his second arteriogram, he was noted to have new weakness of his right upper extremity.  Evaluation was consistent with stroke and was confirmed with MRI.  At the time of his discharge to rehab he was plegic with his right  hand with minimal movement or sensation.  Over the last 6-week he has had improvement with physical therapy and has regained some function though still complains of weakness.  He also complains of significant swelling in his arm as well as discomfort which occurs when he is at rest.  An extended period of time or during his dialysis appointments.  His pain has become severe during these appointments that he is not able to complete his entire dialysis treatment.    Review of systems obtained by the MA reviewed and updated below.      Review of Systems   Constitutional: Negative.    HENT:  Positive for hearing loss (right ear sounds muffled).    Eyes: Negative.    Respiratory:  Positive for apnea (does not use machine).    Cardiovascular: Negative.    Gastrointestinal:  Positive for constipation (bowels slowing down).   Endocrine: Negative.    Genitourinary:         Does not urinate due to dialysis   Musculoskeletal:  Positive for gait problem (uinsteady uses walker).   Skin: Negative.    Allergic/Immunologic: Negative.    Neurological:  Positive for dizziness (chronic but more frequent and more severe goes to PT to help with dizziness), seizures (one seizure while in hospital from high blood pressure), speech difficulty (slow to respond since procedure), weakness (right  hand week since procedure) and numbness (both feet due to neuropathy right hand since procedure). Negative for facial asymmetry and headaches.   Hematological:  Bruises/bleeds easily (aspirin).   Psychiatric/Behavioral:  Positive for sleep disturbance (interrupted due to pain in right hand).        Physical Exam  Vitals:    04/03/24 0829   BP: 116/64   Pulse: 75   Resp: 17   Temp: 98.7 °F (37.1 °C)   SpO2: 96%   He is well appearing.  Affect is appropriate. His BMI is Body mass index is 33.65 kg/m².. He is awake alert and oriented.  Hearing and vision are grossly intact.   His pupils are equal round reactive to light.  His extraocular movements are  intact.  His face is symmetric.  Tongue is midline.  Facial sensation is intact and symmetric throughout.  Shoulder shrug is 5/5.      He has full left-sided strength.  His right upper extremity is diffusely 4 to 4 minus.  This finger extension is 3.  He has decreased sensation in his right upper extremity.  There is some swelling specifically along his hand and forearm.  There are also ulcerated cuts and skin tears.    His gait is normal.    His heart rate is regular.  Normal respiratory effort.  Abdomen nondistended.  Radial pulses 2+.     The following portions of the patient's history were reviewed and updated as appropriate: allergies, current medications, past family history, past medical history, past social history, past surgical history, and problem list.    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,   01/28/2018    Binocular visual disturbance 01/28/2018    Hyperphosphatemia 01/29/2018    Vitamin D deficiency 01/29/2018    Homozygous MTHFR mutation [id number] 02/05/2018    Anemia in chronic kidney disease, on chronic dialysis ([facility]) 02/10/2018    Persistent proteinuria 02/11/2018    Ataxia 07/21/2015    Left atrial dilation 02/27/2018    Renovascular hypertension 03/26/2018    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    Dyslipidemia 08/24/2018    Strabismus 09/24/2018    Environmental and seasonal allergies 05/21/2019    Gastroparesis diabeticorum  ([facility]) 09/17/2019    Multiple pulmonary nodules 12/09/2019    Vertigo 02/20/2020    Impaired mobility and ADLs 02/29/2020    Diabetic neuropathy ([facility]) 03/09/2020    Provoked seizure ([facility]) 03/09/2020    Asterixis 03/09/2020    Foot drop, bilateral 03/09/2020    Secondary hyperparathyroidism ([facility]) 10/23/2020    ESRD on hemodialysis ([facility]) 12/03/2020    Tubulovillous adenoma of  colon 01/18/2021    Gastroesophageal reflux disease without esophagitis 01/18/2021    Depression 04/15/2021    Medical cannabis use 04/15/2021    Mild episode of recurrent major depressive disorder (HCC) 07/01/2021    Generalized anxiety disorder 07/01/2021    Hypothyroidism 02/08/2022    Coronary artery disease involving native coronary artery of native heart without angina pectoris 04/22/2022    Status post placement of implantable loop recorder 05/12/2022    RACHEAL (obstructive sleep apnea)     Type 1 diabetes mellitus on insulin therapy ([facility]) 11/26/2023    Hyperlipidemia 11/26/2023    Insomnia 11/26/2023    Subarachnoid hemorrhage ([facility]) 01/05/2024    Cerebellar stroke syndrome 01/06/2022    Anemia in chronic kidney disease 01/06/2022    Essential (primary) hypertension 01/06/2022    Anemia due to chronic kidney disease, on chronic dialysis ([facility]) 01/05/2024    Primary hypertension 01/11/2024    Cerebrovascular accident (CVA) of left pontine structure ([facility]) 07/21/2015    Obesity (BMI 30.0-34.9) 09/09/2019    Arm paresthesia, right 03/07/2024    Non-aneurysmal perimesencephalic subarachnoid hemorrhage ([facility]) 04/03/2024    Swelling of joint of upper arm, right 04/03/2024     Resolved Ambulatory Problems     Diagnosis Date Noted    Cerebrovascular accident (CVA) of left pontine structure ([facility]) 07/21/2015    Acute renal failure superimposed on stage 3 chronic kidney disease ([facility]) 12/02/2015    Hypertensive urgency 07/21/2015    PONV (postoperative nausea and vomiting) 01/26/2018    Azotemia 01/29/2018    Hypertensive urgency 02/09/2018    Hyperkalemia 02/10/2018    Leukocytosis (leucocytosis) 02/10/2018    Acute kidney injury (HCC) 02/11/2018    End-stage renal disease on peritoneal dialysis ([facility]) 02/11/2018    Bilateral leg edema 02/19/2018    Esophagitis 07/21/2015    Gastroenteritis 07/21/2015    Anemia 05/01/2018    Other constipation 08/09/2018    Peritoneal dialysis catheter in place ([facility]) 08/09/2018    Encounter  for electrocardiogram 09/24/2018    Diarrhea 11/09/2018    Preop cardiovascular exam 03/05/2019    Current moderate episode of major depressive disorder without prior episode (HCC) 03/08/2019    Intractable nausea and vomiting 03/25/2019    Acute otitis media 05/21/2019    Acute otitis externa of right ear 06/01/2019    Ear pain, right 06/05/2019    Wound infection 06/25/2019    Pruritus 09/06/2019    Obesity (BMI 30.0-34.9) 09/09/2019    Peripheral polyneuropathy 11/20/2019    Hypothermia 12/07/2019    Elevated TSH 12/07/2019    Diabetic ketoacidosis associated with type 1 diabetes mellitus (HCC) 12/09/2019    Metabolic encephalopathy 12/11/2019    Sepsis ([facility]) 12/11/2019    Status epilepticus (HCC) 02/21/2020    Left arm pain 02/29/2020    Scalp cyst 03/12/2020    Headache 04/20/2020    End stage renal disease (HCC) 02/11/2018    Recurrent peritonitis (HCC) due to peritoneal dialysis catheter 07/31/2020    Verbalizes suicidal thoughts 09/08/2020    Gastroparesis 09/30/2020    Spontaneous bacterial peritonitis (HCC) 10/19/2020    Chronic diarrhea 10/20/2020    Emesis 10/24/2020    Orthostatic hypotension 10/25/2020    LUQ abdominal pain 11/04/2020    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    Abdominal pain 11/13/2020    Protein-calorie malnutrition (HCC) 11/23/2020    Cellulitis of right elbow 03/31/2021    Numbness of arm 07/01/2021    Pulmonary HTN (HCC) 04/22/2022    Hypoglycemia 07/15/2022    Mastoiditis of right side 07/15/2022    Traumatic onycholysis 07/21/2022    Bilateral leg edema 02/19/2018    Abnormal finding on MRI of brain 01/09/2024    Abdominal pain, acute, epigastric 01/25/2024     Past Medical History:   Diagnosis Date    Ambulates with cane     Anuria     Anxiety     Chronic kidney disease     Diabetes mellitus (HCC)     Falls     GERD (gastroesophageal reflux disease)     History of shingles 2010    History of transfusion 02/2018    Hypertension     Itching     Muscle weakness     Retinopathy      Seizures (HCC)     Skin abnormality     Squamous cell skin cancer     Stroke (HCC)     Swelling of both lower extremities     Vomiting     Wears glasses        Past Surgical History:   Procedure Laterality Date    CARDIAC ELECTROPHYSIOLOGY PROCEDURE N/A 9/21/2023    Procedure: Cardiac loop recorder explant;  Surgeon: Parish Morgan MD;  Location: [facility];  Service: Cardiology    CARDIAC LOOP RECORDER  05/2018    COLONOSCOPY      EGD      EYE SURGERY Right     IR AV FISTULAGRAM/GRAFTOGRAM  02/23/2021    IR CEREBRAL ANGIOGRAPHY  1/12/2024    IR CEREBRAL ANGIOGRAPHY / INTERVENTION  1/5/2024    IR TUNNELED CENTRAL LINE PLACEMENT  02/16/2021    IR TUNNELED DIALYSIS CATHETER PLACEMENT  11/18/2020    IR TUNNELED DIALYSIS CATHETER REMOVAL  02/12/2021    IR TUNNELED DIALYSIS CATHETER REMOVAL  03/11/2021    MOHS SURGERY Left 12/14/2022    Left temple with Dr. Hassan    PERITONEAL CATHETER INSERTION N/A 08/27/2018    Procedure: UNROOF PD CATHETER;  Surgeon: Felipe Lindo DO;  Location: [facility];  Service: General    MS ARTERIOVENOUS ANASTOMOSIS OPEN DIRECT Left 11/09/2020    Procedure: CREATION FISTULA  ARTERIOVENOUS (AV) - LEFT WRIST;  Surgeon: Placido Altamirano MD;  Location: [facility];  Service: Vascular    MS ESOPHAGOGASTRODUODENOSCOPY TRANSORAL DIAGNOSTIC N/A 04/18/2019    Procedure: ESOPHAGOGASTRODUODENOSCOPY (EGD);  Surgeon: Ale Figueroa MD;  Location: [facility];  Service: Gastroenterology    MS LAPS INSERTION TUNNELED INTRAPERITONEAL CATHETER N/A 08/06/2018    Procedure: LAPAROSCOPIC PD CATHETER PLACEMENT;  Surgeon: Felipe Lindo DO;  Location: [facility];  Service: General    MS REMOVAL TUNNELED INTRAPERITONEAL CATHETER N/A 11/18/2020    Procedure: REMOVAL CATHETER PERITONEAL DIALYSIS;  Surgeon: Abdifatah Ty MD;  Location: [facility];  Service: General    TONSILLECTOMY      UPPER GASTROINTESTINAL ENDOSCOPY           Current Outpatient Medications:     aspirin (ECOTRIN LOW STRENGTH) 81 mg EC tablet,  Take 81 mg by mouth daily, Disp: , Rfl:     atorvastatin (LIPITOR) 40 mg tablet, Take 1 tablet (40 mg total) by mouth every evening, Disp: 90 tablet, Rfl: 3    b complex vitamins capsule, Take 1 capsule by mouth daily before lunch , Disp: , Rfl:     calcium acetate (PHOSLO) capsule, Take 1 capsule (667 mg total) by mouth 3 (three) times a day, Disp: 90 capsule, Rfl: 0    cinacalcet (SENSIPAR) 60 MG tablet, Take 1 tablet (60 mg total) by mouth daily, Disp: 30 tablet, Rfl: 0    cloNIDine (CATAPRES-TTS-3) 0.3 mg/24 hr, 1 patch once a week, Disp: , Rfl:     escitalopram (LEXAPRO) 20 mg tablet, Take 1 tablet (20 mg total) by mouth daily, Disp: 90 tablet, Rfl: 2    famotidine (PEPCID) 40 MG tablet, TAKE 1 TABLET BY MOUTH IN THE MORNING, Disp: 90 tablet, Rfl: 0    gabapentin (NEURONTIN) 100 mg capsule, TAKE 2 CAPSULES BY MOUTH AT BEDTIME, Disp: 180 capsule, Rfl: 1    GLUCAGON EMERGENCY 1 MG injection, , Disp: , Rfl: 3    Gvoke HypoPen 1-Pack 1 MG/0.2ML SOAJ, , Disp: , Rfl:     hydrALAZINE (APRESOLINE) 100 MG tablet, Take 1 tablet (100 mg total) by mouth 3 (three) times a day, Disp: 90 tablet, Rfl: 0    hydrOXYzine HCL (ATARAX) 10 mg tablet, Take 1 tablet (10 mg total) by mouth every 6 (six) hours as needed for itching or anxiety, Disp: 30 tablet, Rfl: 3    Insulin Disposable Pump (Omnipod 5 G6 Intro, Gen 5,) KIT, , Disp: , Rfl:     Insulin Disposable Pump (Omnipod 5 G6 Pod, Gen 5,) MISC, , Disp: , Rfl:     labetalol (NORMODYNE) 200 mg tablet, Take 2 tablets (400 mg total) by mouth 3 (three) times a day, Disp: 180 tablet, Rfl: 0    lisinopril (ZESTRIL) 40 mg tablet, Take 1 tablet (40 mg total) by mouth daily, Disp: 30 tablet, Rfl: 0    metoclopramide (REGLAN) 5 mg tablet, Take 1 tablet by mouth three times daily as needed, Disp: 90 tablet, Rfl: 0    minoxidil (LONITEN) 2.5 mg tablet, Take 2 tablets (5 mg total) by mouth 2 (two) times a day, Disp: 120 tablet, Rfl: 0    NIFEdipine (PROCARDIA XL) 30 mg 24 hr tablet, Take 2  tablets (60 mg total) by mouth 2 (two) times a day, Disp: 120 tablet, Rfl: 0    NovoLOG 100 UNIT/ML injection, , Disp: , Rfl:     ondansetron (ZOFRAN) 4 mg tablet, Take 1 tablet (4 mg total) by mouth every 8 (eight) hours as needed for nausea or vomiting, Disp: 90 tablet, Rfl: 0    Patiromer Sorbitex Calcium (VELTASSA PO), Take 5 g by mouth once a week, Disp: , Rfl:     saccharomyces boulardii (FLORASTOR) 250 mg capsule, Take 250 mg by mouth daily, Disp: , Rfl:     SPS 15 GM/60ML suspension, TAKE 60 ML BY MOUTH SINGLE DOSE FOR EMERGENCY USE DURING MISSED HEMODIALYSIS, Disp: , Rfl:     traZODone (DESYREL) 50 mg tablet, Take 0.5 tablets (25 mg total) by mouth daily at bedtime as needed for sleep, Disp: 30 tablet, Rfl: 3    Results/Data: We reviewed his imaging including his most recent MRI and MRI.

## 2024-04-03 NOTE — PROGRESS NOTES
Patient ID: Mateus Mckeon is a 38 y.o. male.    Assessment/Plan:    This is a 37 y/o M with heterozygous Prothrombin gene mtuation, MTHFR mutation homozygous, and seen hematology, and did not think he needed anticoagulation in the setting of multiple strokes, who is here as a hospital follow up for multiple strokes, and was also found to have benign perimesencephalic hemorrhage. Patient had a cerebral angiogram on 1/14/2024 when he presented with a headache, and was found to have SAH, he underwent angiogram which showed right MCA, left insular strokes and right velia strokes and it was thought to be related to angiogram, and patient has remained on aspirin 81mg PO daily.     PLAN:      Diagnoses and all orders for this visit:    Homozygous MTHFR mutation C677T    Cerebrovascular accident (CVA) of left pontine structure (HCC)  -for secondary stroke prevention, recommend continuation of combination of aspirin and atorvastatin  -Blood Pressure goal < 130/80, BP is at goal in the office.   -LDL goal <70  -I advised patient to avoid using NSAIDs for headaches or other pain and to stick to tylenol if needed  -Recommend lifestyle modifications such as mediterranean diet & regular exercise regimen atleast 4-5 times a week for 20-30 minutes.   -I educated patient/family regarding medication compliance  -encourage smoking cessation, and control of diabetes and hypertension; defer management to primary     ESRD on hemodialysis (HCC)    Diabetic polyneuropathy associated with type 1 diabetes mellitus (HCC)  -start patient on gabapentin 100mg at bedtime  -     gabapentin (NEURONTIN) 100 mg capsule; TAKE1 CAPSULES BY MOUTH AT BEDTIME    Right upper extremity numbness  -     gabapentin (NEURONTIN) 100 mg capsule; TAKE1 CAPSULES BY MOUTH AT BEDTIME    Provoked seizure (HCC)    Heterozygous for prothrombin [id number] mutation (HCC)         Follow up in 4 months     I would be happy to see the patient sooner if any new  questions/concerns arise.  Patient/Guardian was advised to the call the office if they have any questions and concerns in the meantime.     Patient/Guardian does understand that if they have any new stroke like symptoms such as facial droop on one side, weakness/paralysis on either side, speech trouble, numbness on one side, balance issues, any vision changes, extreme dizziness or any new headache, to call 9-1-1 immediately or to proceed to the nearest ER immediately.      I have spent a total time of 40 minutes on 04/03/24 in caring for this patient including Risks and benefits of tx options, Instructions for management, Impressions, Counseling / Coordination of care, and Documenting in the medical record.   Subjective:    HPI    37 y/o M w/ prior history of cerebellar and pontine strokes, prothrombin gene mutation, MTHFR mutation, DM, ESRD on HD, presented with headache and was found to have SAH. Mri brain was done which showed punctate strokes in the R MCA territory. I reviewed these images. Patient was found to have RUE weakness after second angiogram. And these strokes were found after that fact. These strokes were thought to be a complication from the conventional angiogram.     Patient has also seen neurosurgery since then and has no other concerns. Continue with aspirin 81mg Po qdaily. He is tolerating it well. Denies any new TIA/ CVA like symptoms.     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), Esophagitis (07/21/2015), Falls, Gastroparesis, GERD (gastroesophageal reflux disease), History of shingles (2010), History of transfusion (02/2018),  Hyperlipidemia, Hyperphosphatemia, Hypertension, Hypoglycemia (07/15/2022), Itching, Mastoiditis of right side (07/15/2022), Muscle weakness, Obesity (BMI 30.0-34.9) (09/09/2019), Orthostatic hypotension (10/25/2020), Peripheral polyneuropathy (11/20/2019), PONV (postoperative nausea and vomiting) (01/26/2018), Protein-calorie malnutrition (HCC) (11/23/2020), Recurrent peritonitis ([facility]) due to peritoneal dialysis catheter (07/31/2020), Retinopathy, Seizures (HCC), Skin abnormality, Spontaneous bacterial peritonitis (HCC) (10/19/2020), Squamous cell skin cancer, Stroke ([facility]), Swelling of both lower extremities, Traumatic onycholysis (07/21/2022), Vomiting, and Wears glasses.  He   Patient Active Problem List    Diagnosis Date Noted    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) 04/03/2024    Swelling of joint of upper arm, right 04/03/2024    Arm paresthesia, right 03/07/2024    Primary hypertension 01/11/2024    Subarachnoid hemorrhage (HCC) 01/05/2024    Anemia due to chronic kidney disease, on chronic dialysis ([facility]) 01/05/2024    Type 1 diabetes mellitus on insulin therapy ([facility]) 11/26/2023    Hyperlipidemia 11/26/2023    Insomnia 11/26/2023    RACHEAL (obstructive sleep apnea)     Status post placement of implantable loop recorder 05/12/2022    Coronary artery disease involving native coronary artery of native heart without angina pectoris 04/22/2022    Hypothyroidism 02/08/2022    Cerebellar stroke syndrome 01/06/2022    Anemia in chronic kidney disease 01/06/2022    Essential (primary) hypertension 01/06/2022    Mild episode of recurrent major depressive disorder (HCC) 07/01/2021    Generalized anxiety disorder 07/01/2021    Depression 04/15/2021    Medical cannabis use 04/15/2021    Tubulovillous adenoma of colon 01/18/2021    Gastroesophageal reflux disease without esophagitis 01/18/2021    ESRD on hemodialysis ([facility]) 12/03/2020    Secondary hyperparathyroidism ([facility]) 10/23/2020    Diabetic  neuropathy (HCC) 03/09/2020    Provoked seizure (HCC) 03/09/2020    Asterixis 03/09/2020    Foot drop, bilateral 03/09/2020    Impaired mobility and ADLs 02/29/2020    Vertigo 02/20/2020    Multiple pulmonary nodules 12/09/2019    Gastroparesis diabeticorum  (HCC) 09/17/2019    Obesity (BMI 30.0-34.9) 09/09/2019    Environmental and seasonal allergies 05/21/2019    Strabismus 09/24/2018    Dyslipidemia 08/24/2018    Heterozygous for prothrombin [id number] mutation (HCC) 06/04/2018    Renovascular hypertension 03/26/2018    Left atrial dilation 02/27/2018    Persistent proteinuria 02/11/2018    Anemia in chronic kidney disease, on chronic dialysis (HCC) 02/10/2018    Homozygous MTHFR mutation C677T 02/05/2018    Hyperphosphatemia 01/29/2018    Vitamin D deficiency 01/29/2018    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,   01/28/2018    Binocular visual disturbance 01/28/2018    History of lacunar cerebrovascular accident (CVA) 01/22/2018    Type 1 diabetes mellitus (HCC) 06/19/2017    Ataxia 07/21/2015    Cerebrovascular accident (CVA) of left pontine structure (HCC) 07/21/2015     He  has a past surgical history that includes Tonsillectomy; Cardiac Loop Recorder (05/2018); pr laps insertion tunneled intraperitoneal catheter (N/A, 08/06/2018); Peritoneal catheter insertion (N/A, 08/27/2018); Eye surgery (Right); EGD; pr esophagogastroduodenoscopy transoral diagnostic (N/A, 04/18/2019); Upper gastrointestinal endoscopy; Colonoscopy; pr arteriovenous anastomosis open direct (Left, 11/09/2020); IR tunneled dialysis catheter placement (11/18/2020); pr removal tunneled intraperitoneal catheter (N/A, 11/18/2020); IR tunneled dialysis catheter removal (02/12/2021); IR tunneled central line placement (02/16/2021); IR AV fistulagram/graftogram (02/23/2021); IR tunneled dialysis catheter removal (03/11/2021); Mohs surgery (Left, 12/14/2022); Cardiac electrophysiology procedure (N/A, 9/21/2023); IR cerebral angiography /  intervention (1/5/2024); and IR cerebral angiography (1/12/2024).  His family history includes Breast cancer in his mother; Diabetes in his paternal grandfather; Heart disease in his paternal grandfather; Hyperlipidemia in his father, maternal grandfather, and paternal grandmother; Hypertension in his father, maternal grandfather, mother, and paternal grandmother; Leukemia in his maternal grandmother.  He  reports that he quit smoking about 6 years ago. His smoking use included cigarettes. He started smoking about 18 years ago. He has a 6 pack-year smoking history. He has never used smokeless tobacco. He reports that he does not currently use alcohol. He reports current drug use. Drug: Marijuana.  Current Outpatient Medications   Medication Sig Dispense Refill    aspirin (ECOTRIN LOW STRENGTH) 81 mg EC tablet Take 81 mg by mouth daily      atorvastatin (LIPITOR) 40 mg tablet Take 1 tablet (40 mg total) by mouth every evening 90 tablet 3    b complex vitamins capsule Take 1 capsule by mouth daily before lunch       calcium acetate (PHOSLO) capsule Take 1 capsule (667 mg total) by mouth 3 (three) times a day 90 capsule 0    cinacalcet (SENSIPAR) 60 MG tablet Take 1 tablet (60 mg total) by mouth daily 30 tablet 0    cloNIDine (CATAPRES-TTS-3) 0.3 mg/24 hr 1 patch once a week      escitalopram (LEXAPRO) 20 mg tablet Take 1 tablet (20 mg total) by mouth daily 90 tablet 2    famotidine (PEPCID) 40 MG tablet TAKE 1 TABLET BY MOUTH IN THE MORNING 90 tablet 0    gabapentin (NEURONTIN) 100 mg capsule TAKE1 CAPSULES BY MOUTH AT BEDTIME 90 capsule 3    GLUCAGON EMERGENCY 1 MG injection   3    Gvoke HypoPen 1-Pack 1 MG/0.2ML SOAJ       hydrALAZINE (APRESOLINE) 100 MG tablet Take 1 tablet (100 mg total) by mouth 3 (three) times a day 90 tablet 0    hydrOXYzine HCL (ATARAX) 10 mg tablet Take 1 tablet (10 mg total) by mouth every 6 (six) hours as needed for itching or anxiety 30 tablet 3    Insulin Disposable Pump (Omnipod 5 G6  Intro, Gen 5,) KIT       Insulin Disposable Pump (Omnipod 5 G6 Pod, Gen 5,) MISC       labetalol (NORMODYNE) 200 mg tablet Take 2 tablets (400 mg total) by mouth 3 (three) times a day 180 tablet 0    lisinopril (ZESTRIL) 40 mg tablet Take 1 tablet (40 mg total) by mouth daily 30 tablet 0    metoclopramide (REGLAN) 5 mg tablet Take 1 tablet by mouth three times daily as needed 90 tablet 0    minoxidil (LONITEN) 2.5 mg tablet Take 2 tablets (5 mg total) by mouth 2 (two) times a day 120 tablet 0    NIFEdipine (PROCARDIA XL) 30 mg 24 hr tablet Take 2 tablets (60 mg total) by mouth 2 (two) times a day 120 tablet 0    NovoLOG 100 UNIT/ML injection       ondansetron (ZOFRAN) 4 mg tablet Take 1 tablet (4 mg total) by mouth every 8 (eight) hours as needed for nausea or vomiting 90 tablet 0    Patiromer Sorbitex Calcium (VELTASSA PO) Take 5 g by mouth once a week      saccharomyces boulardii (FLORASTOR) 250 mg capsule Take 250 mg by mouth daily      SPS 15 GM/60ML suspension TAKE 60 ML BY MOUTH SINGLE DOSE FOR EMERGENCY USE DURING MISSED HEMODIALYSIS      traZODone (DESYREL) 50 mg tablet Take 0.5 tablets (25 mg total) by mouth daily at bedtime as needed for sleep 15 tablet 0     No current facility-administered medications for this visit.     Current Outpatient Medications on File Prior to Visit   Medication Sig    aspirin (ECOTRIN LOW STRENGTH) 81 mg EC tablet Take 81 mg by mouth daily    atorvastatin (LIPITOR) 40 mg tablet Take 1 tablet (40 mg total) by mouth every evening    b complex vitamins capsule Take 1 capsule by mouth daily before lunch     calcium acetate (PHOSLO) capsule Take 1 capsule (667 mg total) by mouth 3 (three) times a day    cinacalcet (SENSIPAR) 60 MG tablet Take 1 tablet (60 mg total) by mouth daily    cloNIDine (CATAPRES-TTS-3) 0.3 mg/24 hr 1 patch once a week    escitalopram (LEXAPRO) 20 mg tablet Take 1 tablet (20 mg total) by mouth daily    famotidine (PEPCID) 40 MG tablet TAKE 1 TABLET BY MOUTH IN  THE MORNING    GLUCAGON EMERGENCY 1 MG injection     Gvoke HypoPen 1-Pack 1 MG/0.2ML SOAJ     hydrALAZINE (APRESOLINE) 100 MG tablet Take 1 tablet (100 mg total) by mouth 3 (three) times a day    hydrOXYzine HCL (ATARAX) 10 mg tablet Take 1 tablet (10 mg total) by mouth every 6 (six) hours as needed for itching or anxiety    Insulin Disposable Pump (Omnipod 5 G6 Intro, Gen 5,) KIT     Insulin Disposable Pump (Omnipod 5 G6 Pod, Gen 5,) MISC     labetalol (NORMODYNE) 200 mg tablet Take 2 tablets (400 mg total) by mouth 3 (three) times a day    lisinopril (ZESTRIL) 40 mg tablet Take 1 tablet (40 mg total) by mouth daily    metoclopramide (REGLAN) 5 mg tablet Take 1 tablet by mouth three times daily as needed    minoxidil (LONITEN) 2.5 mg tablet Take 2 tablets (5 mg total) by mouth 2 (two) times a day    NIFEdipine (PROCARDIA XL) 30 mg 24 hr tablet Take 2 tablets (60 mg total) by mouth 2 (two) times a day    NovoLOG 100 UNIT/ML injection     ondansetron (ZOFRAN) 4 mg tablet Take 1 tablet (4 mg total) by mouth every 8 (eight) hours as needed for nausea or vomiting    Patiromer Sorbitex Calcium (VELTASSA PO) Take 5 g by mouth once a week    saccharomyces boulardii (FLORASTOR) 250 mg capsule Take 250 mg by mouth daily    SPS 15 GM/60ML suspension TAKE 60 ML BY MOUTH SINGLE DOSE FOR EMERGENCY USE DURING MISSED HEMODIALYSIS     No current facility-administered medications on file prior to visit.     He is allergic to sulfa antibiotics..    Objective:    Blood pressure 118/76, pulse 70, temperature 98.2 °F (36.8 °C), temperature source Temporal, weight 91.9 kg (202 lb 9.6 oz), SpO2 97%.    Physical Exam  General - patient is alert   Speech - no dysarthria noted, no aphasia noted.     Neuro:   Cranial nerves: PERRL, EOMI, facial sensation intact to soft touch in V1, V2 and V3, no facial asymmetry noted, uvula/palate midline, tongue midline.   Motor: 5/5 throughout, normal tone, no pronator drift noted.   Sensory - intact to  soft touch throughout  Reflexes - 2+ throughout  Coordination - no ataxia/dysmetria noted  Gait - normal      ROS:  Reviewed ROS  Review of Systems   Constitutional:  Positive for fatigue (tired all the time). Negative for appetite change and fever.   HENT: Negative.  Negative for hearing loss, tinnitus, trouble swallowing and voice change.    Eyes: Negative.  Negative for photophobia, pain and visual disturbance.   Respiratory: Negative.  Negative for shortness of breath.    Cardiovascular: Negative.  Negative for palpitations.   Gastrointestinal: Negative.  Negative for nausea and vomiting.   Endocrine: Negative.  Negative for cold intolerance.   Genitourinary: Negative.  Negative for dysuria, frequency and urgency.   Musculoskeletal:  Positive for gait problem (balance issues). Negative for back pain, myalgias, neck pain and neck stiffness.   Skin: Negative.  Negative for rash.   Allergic/Immunologic: Negative.    Neurological:  Positive for dizziness, tremors (l hand), weakness (L hand) and numbness (l hand, feet d/t neuropathy). Negative for seizures, syncope, facial asymmetry, speech difficulty, light-headedness and headaches.   Hematological: Negative.  Does not bruise/bleed easily.   Psychiatric/Behavioral: Negative.  Negative for confusion, hallucinations and sleep disturbance.

## 2024-04-04 ENCOUNTER — OFFICE VISIT (OUTPATIENT)
Facility: CLINIC | Age: 39
End: 2024-04-04
Payer: MEDICARE

## 2024-04-04 DIAGNOSIS — I63.89 CEREBROVASCULAR ACCIDENT (CVA) DUE TO OTHER MECHANISM (HCC): Primary | ICD-10-CM

## 2024-04-04 DIAGNOSIS — I63.9 ACUTE CVA (CEREBROVASCULAR ACCIDENT) (HCC): Primary | ICD-10-CM

## 2024-04-04 PROCEDURE — 97112 NEUROMUSCULAR REEDUCATION: CPT | Performed by: PHYSICAL THERAPIST

## 2024-04-04 PROCEDURE — 97530 THERAPEUTIC ACTIVITIES: CPT

## 2024-04-04 NOTE — PROGRESS NOTES
"Daily Note     Today's date: 2024  Patient name: Mateus Mckeon  : 1985  MRN: [id number]  Referring provider: Dania Sher DO  Dx:   Encounter Diagnosis     ICD-10-CM    1. Cerebrovascular accident (CVA) due to other mechanism (HCC)  I63.89               Start Time: 0803  Stop Time: 08  Total time in clinic (min): 40 minutes      PLAN OF CARE START: 24  PLAN OF CARE END: 2024  FREQUENCY: 2 times a week  PRECAUTIONS: Renal failure, actively going through dialysis; type 1 diabetes; HTN; SAH; seizure (last one was 2019)      Subjective: Pt reported he felt great following last session. \"Last session was a good one.\"    PLEASE COMPLETE MAS NEXT SESSION     Objective: See treatment diary below      TA:  -completed 2x20 with 3 second hold blue theraband rows with cone to improve proximal stability  -completed 2x10 5lb small tidal tabk bicep curls to improve proximal stability and bicep strength  -completed 2x10 with 3 second hold red theraweb push and pulls to improve wrist stability and strength.   -pt completed x5 modified push ups on elevated plinth with red dynadisc under R UE to improve strength and stability. Pt reported overall 4/10 pain in wrist but 6/10 pain when in wrist extension/elbow flexion position.   -pt completed marble transfer with red resisted clip to don marbles on numbers then letters in ascending order. Pt asked to name animals that begin with letter on letter board. Focused on divided attention, pinch strength, and UE coordination and endurance. Pt reported fatigue at letter 'M' and was given yellow (downgraded) clip.       Assessment: Pt tolerated activities well. Demonstrated improved RUE proximal muscular endurance and coordination. Pt reported fatigue following session today. Pt would benefit from continued skilled occupational therapy services to improve fine motor coordination, dexterity, stregnth, UE function, and functional cognition.        Plan: " Continue per plan of care.     SHORT TERM GOALS ADDED 2/20/24:  Pt will increase sustained attention by completing trail making part A in 35 seconds to complete IADLs NOT MET  Pt will increase divided attention by completing trail making part B in 1 minute 30 seconds to complete IADLs NOT MET  Pt will increase delayed recall and recall 4 /5 items on MOCA to complete IADLs GOAL MET    LONG TERM GOALS ADDED 2/20/24:  Pt will increase sustained attention by completing trail making part A in 38 seconds to complete IADLs  Pt will increase divided attention by completing trail making part B in 1 minute 10 seconds to complete IADLs  Pt will increase delayed recall and recall 5/5 items on MOCA to complete IADLs  Resume right  hand dominance to refined functional assist with all activities of daily living

## 2024-04-04 NOTE — PROGRESS NOTES
"Daily Note     Today's date: 2024  Patient name: Mateus Mckeon  : 1985  MRN: [id number]  Referring provider: Dania Sher DO  Dx:   Encounter Diagnosis     ICD-10-CM    1. Acute CVA (cerebrovascular accident) (HCC)  I63.9                 POC expires Auth Status Total   Visits  Start date  Expiration date PT/OT + Visit Limit? Co-Insurance   24     bomn                                            Visit/Unit Tracking  AUTH Status:  Date                 Authed:  Used                Remaining                               Subjective: Pt reports no new changes.       Objective: See treatment diary below  *FLUID RESTRICTION- do not offer water*    HR 82 bpm, SpO2 97%     SOLO STEP:  - FWD/BCK walking, no AD: 10 feet x 7 reps  - FWD stepping over (6) 9\" hurdles: 6 sets, 0 UE  - LAT stepping over (6) 9\" hurdles, 4 cycles down/back, no UE   - A-P sway x 20 reps  - Step up to 6\" step, 15 reps, 10 reps (pt felt nauseous following 1set set)  - Feet close EC x 30 seconds, 3 reps       Assessment: Pt tolerated treatment well today. He reported feeling nauseous after 1st set of step ups, took seated rest break and dry heaved into basin. HR 81 bpm, SpO2 96%, /60 mmHg which he reports is low for him. After a few min seated rest break he reported feeling fine and was able to continue session. He reports he does get nauseous and vomits 1-2x/week. Pt would benefit from continued PT to improve balance, endurance, and reduce fall risk.       Plan: Continue per plan of care.  Focus is balance &  gait. ADD RESISTED WALKING NEXT TIME.          Outcome Measures Initial Eval  24 Re-eval  24 PN  2024      5xSTS 31.25 sec 52.20 sec no UE's    (1 LOB) 19.91 sec no UE      TUG 16.3 sec with rollator    23.6 sec no AD 20.68 sec with rollator    18.44 sec no AD 23.41 sec with rollator    12.92 sec no AD      10 meter  Next visit> 0.88 m/s      MEDRANO  Next visit> 44/56      FGA  Next visit>    "    6MWT 900 ft 710 ft 760 ft no AD      mCTSIB  FTEO firm  FTEC firm  FTEO foam  FTEC foam   30 sec  30 sec  30 sec  1 sec  30 sec  30 sec  5.5 sec  defer      ABC  65% 43.75%

## 2024-04-05 ENCOUNTER — TELEPHONE (OUTPATIENT)
Age: 39
End: 2024-04-05

## 2024-04-05 NOTE — TELEPHONE ENCOUNTER
Patients mother was calling with concerns about her son and the transplant list. She said he is currently at dialysis, and they said their paperwork stated he was inactive on the Transplant list. She wanted to verify to make sure her son was active for a transplant on the [place] list. Please call back to address this. Thank you.

## 2024-04-05 NOTE — TELEPHONE ENCOUNTER
I called and spoke to Bill he is aware that  will see him at the end of the month and further discuss case. In the Mean time per  pt became inactive due to CVA. He was cleared by neurosurgery and good to go back active. He should expect a call from [place] coordinator to discuss per . patient aware to call office if he does not get a call back by next week.

## 2024-04-09 ENCOUNTER — OFFICE VISIT (OUTPATIENT)
Facility: CLINIC | Age: 39
End: 2024-04-09
Payer: MEDICARE

## 2024-04-09 ENCOUNTER — PREP FOR PROCEDURE (OUTPATIENT)
Dept: GASTROENTEROLOGY | Facility: CLINIC | Age: 39
End: 2024-04-09

## 2024-04-09 ENCOUNTER — TELEPHONE (OUTPATIENT)
Dept: GASTROENTEROLOGY | Facility: CLINIC | Age: 39
End: 2024-04-09

## 2024-04-09 DIAGNOSIS — D12.6 TUBULOVILLOUS ADENOMA OF COLON: ICD-10-CM

## 2024-04-09 DIAGNOSIS — Z86.010 HISTORY OF COLON POLYPS: Primary | ICD-10-CM

## 2024-04-09 DIAGNOSIS — I63.9 ACUTE CVA (CEREBROVASCULAR ACCIDENT) (HCC): Primary | ICD-10-CM

## 2024-04-09 DIAGNOSIS — I63.89 CEREBROVASCULAR ACCIDENT (CVA) DUE TO OTHER MECHANISM (HCC): Primary | ICD-10-CM

## 2024-04-09 PROCEDURE — 97112 NEUROMUSCULAR REEDUCATION: CPT | Performed by: PHYSICAL THERAPIST

## 2024-04-09 PROCEDURE — 97530 THERAPEUTIC ACTIVITIES: CPT

## 2024-04-09 NOTE — PROGRESS NOTES
"Daily Note     Today's date: 2024  Patient name: Mateus Mckeon  : 1985  MRN: [id number]  Referring provider: Dania Sher DO  Dx:   Encounter Diagnosis     ICD-10-CM    1. Acute CVA (cerebrovascular accident) (HCC)  I63.9                   POC expires Auth Status Total   Visits  Start date  Expiration date PT/OT + Visit Limit? Co-Insurance   24     bomn                                            Visit/Unit Tracking  AUTH Status:  Date                 Authed:  Used                Remaining                               Subjective: Pt reports no new changes.       Objective: See treatment diary below  *FLUID RESTRICTION- do not offer water*    HR 82 bpm, SpO2 97%     SOLO STEP:  - FWD/BCK walking, no AD: 20 feet x 7 reps  - Resisted walking with red band: 20 feet x 6 laps  - Resisted sit>stands (red TB): 10 reps   - FWD stepping over (6) 9\" hurdles: 6 sets, 0 UE  - LAT stepping over (6) 9\" hurdles, 4 cycles down/back, no UE   - Step up to 6\" step, 10 reps each leg       Assessment: Pt tolerated treatment well today. Added resisted walking and resisted sit>stands today to increase intensity. No nausea reported t/o session. A few instances of retropulsion during step ups. Pt would benefit from continued PT to improve balance, endurance, and reduce fall risk.       Plan: Continue per plan of care.  Focus is balance &  gait.          Outcome Measures Initial Eval  24 Re-eval  24 PN  2024      5xSTS 31.25 sec 52.20 sec no UE's    (1 LOB) 19.91 sec no UE      TUG 16.3 sec with rollator    23.6 sec no AD 20.68 sec with rollator    18.44 sec no AD 23.41 sec with rollator    12.92 sec no AD      10 meter  Next visit> 0.88 m/s      MEDRANO  Next visit> 44/56      FGA  Next visit>       6MWT 900 ft 710 ft 760 ft no AD      mCTSIB  FTEO firm  FTEC firm  FTEO foam  FTEC foam   30 sec  30 sec  30 sec  1 sec  30 sec  30 sec  5.5 sec  defer      ABC  65% 43.75%           "

## 2024-04-09 NOTE — TELEPHONE ENCOUNTER
Scheduled date of colonoscopy (as of today):08/01/24  Physician performing colonoscopy:Dr. Phelan  Location of colonoscopy:[place]  Bowel prep reviewed with patient:Alicia Casey  Instructions reviewed with patient by:malik  Clearances: n/a

## 2024-04-09 NOTE — PROGRESS NOTES
Daily Note     Today's date: 2024  Patient name: Mateus Mckeon  : 1985  MRN: [id number]  Referring provider: Dania Sher DO  Dx:   Encounter Diagnosis     ICD-10-CM    1. Cerebrovascular accident (CVA) due to other mechanism (HCC)  I63.89           Start Time: 847  Stop Time: 927  Total time in clinic (min): 40 minutes      PLAN OF CARE START: 24  PLAN OF CARE END: 2024  FREQUENCY: 2 times a week  PRECAUTIONS: Renal failure, actively going through dialysis; type 1 diabetes; HTN; SAH; seizure (last one was 2019)      Subjective: Pt reported he feels tired today. Pt reports he feels like his progress has slowed down a bit. Pt desires to improve being able to tie his own shoes.     Objective: See treatment diary below  ON FLUID RESTRICTIONS--DO NOT OFFER WATER    TA:  -completed x20 with 3 second hold blue theraband rows with cone to improve proximal stability  -completed 2x15 5lb bicep curls to improve proximal strength and stability   -pt attempted utilizing power gripper to stack 1 inch foam cubes however on second and first settings pt reported max difficulty. Task downgraded to pt utilizing 1pt pinch to grasp cubes and stack. Pt asked to name animals that begin with letters of the alphabet in ascending order after stacking a cube. Focused on R UE coordination, 2pt pinch, and divided attention.  -completed in hand manipulation to turn larger peg to don to peg board x15 to improve in hand manipulation.      Assessment: Pt tolerated activities well. Demonstrated improved RUE proximal muscular endurance and coordination. Pt reported fatigue following session today. Pt would benefit from continued skilled occupational therapy services to improve fine motor coordination, dexterity, stregnth, UE function, and functional cognition.        Plan: Continue per plan of care.     SHORT TERM GOALS ADDED 24:  Pt will increase sustained attention by completing trail making part A in 35  seconds to complete IADLs NOT MET  Pt will increase divided attention by completing trail making part B in 1 minute 30 seconds to complete IADLs NOT MET  Pt will increase delayed recall and recall 4 /5 items on MOCA to complete IADLs GOAL MET    LONG TERM GOALS ADDED 2/20/24:  Pt will increase sustained attention by completing trail making part A in 38 seconds to complete IADLs  Pt will increase divided attention by completing trail making part B in 1 minute 10 seconds to complete IADLs  Pt will increase delayed recall and recall 5/5 items on MOCA to complete IADLs  Resume right  hand dominance to refined functional assist with all activities of daily living

## 2024-04-11 ENCOUNTER — OFFICE VISIT (OUTPATIENT)
Facility: CLINIC | Age: 39
End: 2024-04-11
Payer: MEDICARE

## 2024-04-11 DIAGNOSIS — I63.9 ACUTE CVA (CEREBROVASCULAR ACCIDENT) (HCC): Primary | ICD-10-CM

## 2024-04-11 DIAGNOSIS — K31.84 GASTROPARESIS: ICD-10-CM

## 2024-04-11 DIAGNOSIS — I63.89 CEREBROVASCULAR ACCIDENT (CVA) DUE TO OTHER MECHANISM (HCC): Primary | ICD-10-CM

## 2024-04-11 PROCEDURE — 97530 THERAPEUTIC ACTIVITIES: CPT

## 2024-04-11 PROCEDURE — 97112 NEUROMUSCULAR REEDUCATION: CPT

## 2024-04-11 RX ORDER — METOCLOPRAMIDE 5 MG/1
5 TABLET ORAL 3 TIMES DAILY PRN
Qty: 90 TABLET | Refills: 0 | Status: SHIPPED | OUTPATIENT
Start: 2024-04-11

## 2024-04-11 NOTE — PROGRESS NOTES
"Daily Note     Today's date: 2024  Patient name: Mateus Mckeon  : 1985  MRN: [id number]  Referring provider: Dania Sher DO  Dx:   Encounter Diagnosis     ICD-10-CM    1. Cerebrovascular accident (CVA) due to other mechanism (HCC)  I63.89           Start Time: 0801  Stop Time: 0840  Total time in clinic (min): 39 minutes      PLAN OF CARE START: 24  PLAN OF CARE END: 2024  FREQUENCY: 2 times a week  PRECAUTIONS: Renal failure, actively going through dialysis; type 1 diabetes; HTN; SAH; seizure (last one was 2019); history of 2 previous strokes      Subjective: Pt reported he feels tired today. He reports his arm feels the same. He says he slept up until dialysis and had to stop it half way through due to pain in the extremity. Pt reports he feels as though his arm hurts due to having to be stationary for a prolonged period of time. He states that he asked for a standing break but was not able to do so therefore cut dialysis short.     Objective: See treatment diary below  ON FLUID RESTRICTIONS--DO NOT OFFER WATER    TA:  -completed 2x10 with 3 second hold blue theraband rows with cone to improve proximal stability  -completed 2x10 5lb bicep curls to improve proximal strength and stability   -completed lateral push ups 2x10 to improve tricep strength and proximal stability   -completed in hand manipulation and palm to finger translation to connect [employer] to improve in hand manipulation and fine motor coordination x20  -completed n hand manipulation and palm to finger translation to connect building blocks to improve in hand manipulation and fine motor coordination x6. Pt reported moderate dizziness and requested to lay down. Pt reports he did not eat today due to his stomach being \"messed up\" from vomiting last night. OT monitored pt.       Assessment: Pt tolerated activities well. Demonstrated improved RUE proximal muscular endurance and coordination. Pt reported fatigue " following session today. Pt would benefit from continued skilled occupational therapy services to improve fine motor coordination, dexterity, stregnth, UE function, and functional cognition.        Plan: Continue per plan of care.     SHORT TERM GOALS ADDED 2/20/24:  Pt will increase sustained attention by completing trail making part A in 35 seconds to complete IADLs NOT MET  Pt will increase divided attention by completing trail making part B in 1 minute 30 seconds to complete IADLs NOT MET  Pt will increase delayed recall and recall 4 /5 items on MOCA to complete IADLs GOAL MET    LONG TERM GOALS ADDED 2/20/24:  Pt will increase sustained attention by completing trail making part A in 38 seconds to complete IADLs  Pt will increase divided attention by completing trail making part B in 1 minute 10 seconds to complete IADLs  Pt will increase delayed recall and recall 5/5 items on MOCA to complete IADLs  Resume right  hand dominance to refined functional assist with all activities of daily living

## 2024-04-11 NOTE — PROGRESS NOTES
"Daily Note     Today's date: 2024  Patient name: Mateus Mckeon  : 1985  MRN: [id number]  Referring provider: Dania Sher DO  Dx:   Encounter Diagnosis     ICD-10-CM    1. Acute CVA (cerebrovascular accident) (HCC)  I63.9                     POC expires Auth Status Total   Visits  Start date  Expiration date PT/OT + Visit Limit? Co-Insurance   24     bomn                                            Visit/Unit Tracking  AUTH Status:  Date                 Authed:  Used                Remaining                               Subjective: Pt reports throwing up last night so didn't eat this AM, is fatigued.       Objective: See treatment diary below  *FLUID RESTRICTION- do not offer water*    HR 78 bpm, SpO2 94%     SOLO STEP:  - FWD/BCK walking, no AD: 20 feet x 7 reps  - Step up to 6\" step, 30\" each leg, 2 sets  - FWD/Retro stepping over 6\" hurdles w/ 5# TT, 1 minute  - Lat stepping over haydee w/ 5# TT, 1 minute   - Resisted sit>stands (green TB): 10 reps   - Around the worlds w/ 5# TT w/ green posterior resistance, 1 minute each direction             Assessment: Pt tolerated treatment well today despite fatigue/ nausea. Provided rest breaks as needed. A few instances of retropulsion during step ups and retro haydee activity. Increased intensity by using 5# TT during some activities.. Pt would benefit from continued PT to improve balance, endurance, and reduce fall risk.       Plan: Continue per plan of care.  Focus is balance &  gait.          Outcome Measures Initial Eval  24 Re-eval  24 PN  2024      5xSTS 31.25 sec 52.20 sec no UE's    (1 LOB) 19.91 sec no UE      TUG 16.3 sec with rollator    23.6 sec no AD 20.68 sec with rollator    18.44 sec no AD 23.41 sec with rollator    12.92 sec no AD      10 meter  Next visit> 0.88 m/s      MEDRANO  Next visit> 44/56      FGA  Next visit>       6MWT 900 ft 710 ft 760 ft no AD      mCTSIB  FTEO firm  FTEC firm  FTEO " foam  FTEC foam   30 sec  30 sec  30 sec  1 sec  30 sec  30 sec  5.5 sec  defer      ABC  65% 43.75%

## 2024-04-16 ENCOUNTER — HOSPITAL ENCOUNTER (OUTPATIENT)
Dept: VASCULAR ULTRASOUND | Facility: HOSPITAL | Age: 39
Discharge: HOME | End: 2024-04-16
Attending: NEUROLOGICAL SURGERY
Payer: MEDICARE

## 2024-04-16 ENCOUNTER — APPOINTMENT (OUTPATIENT)
Facility: CLINIC | Age: 39
End: 2024-04-16
Payer: MEDICARE

## 2024-04-16 DIAGNOSIS — M25.421 SWELLING OF JOINT OF UPPER ARM, RIGHT: ICD-10-CM

## 2024-04-16 DIAGNOSIS — Z86.73 HISTORY OF STROKE: ICD-10-CM

## 2024-04-16 PROCEDURE — 93971 EXTREMITY STUDY: CPT

## 2024-04-16 PROCEDURE — 93971 EXTREMITY STUDY: CPT | Performed by: SURGERY

## 2024-04-19 ENCOUNTER — OFFICE VISIT (OUTPATIENT)
Facility: CLINIC | Age: 39
End: 2024-04-19
Payer: MEDICARE

## 2024-04-19 DIAGNOSIS — I63.89 CEREBROVASCULAR ACCIDENT (CVA) DUE TO OTHER MECHANISM (HCC): Primary | ICD-10-CM

## 2024-04-19 DIAGNOSIS — I63.9 ACUTE CVA (CEREBROVASCULAR ACCIDENT) (HCC): Primary | ICD-10-CM

## 2024-04-19 PROCEDURE — 97112 NEUROMUSCULAR REEDUCATION: CPT | Performed by: PHYSICAL THERAPIST

## 2024-04-19 PROCEDURE — 97530 THERAPEUTIC ACTIVITIES: CPT

## 2024-04-19 NOTE — PROGRESS NOTES
"Daily Note     Today's date: 2024  Patient name: Mateus Mckeon  : 1985  MRN: [id number]  Referring provider: Dania Sher DO  Dx:   Encounter Diagnosis     ICD-10-CM    1. Acute CVA (cerebrovascular accident) (HCC)  I63.9                     POC expires Auth Status Total   Visits  Start date  Expiration date PT/OT + Visit Limit? Co-Insurance   24     bomn                                            Visit/Unit Tracking  AUTH Status:  Date                 Authed:  Used                Remaining                               Subjective: Pt reports that his pump and glucose monitored failed yesterday      Objective: See treatment diary below  *FLUID RESTRICTION- do not offer water*    HR 79 bpm, SpO2 97%     SOLO STEP:  - FWD/BCK walking, no AD: 20 feet x 7 reps  - FWD/Retro stepping over 6\" hurdlesw/ 5# TT, 1:30   - LAT stepping over 6\" haydee w/ 5# TT, 1:30   - Step up on river rocks: 10x 2 sets  - Resisted sit>stands (green TB): 10 reps   - FAEO/EC on foam: 2 min total  - Marching on foam: 10x         Assessment: Patient tolerated treatment well. While on foam or river rocks patient requires PT assist to maintain balance and cueing to prevent retropulsion. With EC patient able to maintain balance for approx 10 sec before posterior LOB. SOLO STEP maintained for all exercises to maximize patient safety. Pt would benefit from continued PT to improve balance, endurance, and reduce fall risk.       Plan: Continue per plan of care.  Focus is balance &  gait.          Outcome Measures Initial Eval  24 Re-eval  24 PN  2024      5xSTS 31.25 sec 52.20 sec no UE's    (1 LOB) 19.91 sec no UE      TUG 16.3 sec with rollator    23.6 sec no AD 20.68 sec with rollator    18.44 sec no AD 23.41 sec with rollator    12.92 sec no AD      10 meter  Next visit> 0.88 m/s      MEDRANO  Next visit> 44/56      FGA  Next visit>       6MWT 900 ft 710 ft 760 ft no AD      mCTSIB  FTEO firm  FTEC " firm  FTEO foam  FTEC foam   30 sec  30 sec  30 sec  1 sec  30 sec  30 sec  5.5 sec  defer      ABC  65% 43.75%

## 2024-04-19 NOTE — PROGRESS NOTES
Daily Note     Today's date: 2024  Patient name: Mateus Mckeon  : 1985  MRN: [id number]  Referring provider: Dania Sher DO  Dx:   Encounter Diagnosis     ICD-10-CM    1. Cerebrovascular accident (CVA) due to other mechanism (HCC)  I63.89             Start Time: 0802  Stop Time: 0845  Total time in clinic (min): 43 minutes      PLAN OF CARE START: 24  PLAN OF CARE END: 2024  FREQUENCY: 2 times a week  PRECAUTIONS: Renal failure, actively going through dialysis; type 1 diabetes; HTN; SAH; seizure (last one was 2019); history of 2 previous strokes      Subjective: Pt reported his glucose monitor and pump were not working and his BS was in the 400s. Pt reports he feels like his hand is still swollen.     Objective: See treatment diary below  ON FLUID RESTRICTIONS--DO NOT OFFER WATER    TA:  -completed 3x10 with 3 second hold green theraband rows with cone to improve proximal stability  -completed 2x10 5lb bicep curls to improve proximal strength and stability   -completed 2x10 red theraweb push pulls to improve wrist stability  -completed 2x10 with 3 second hold with red theraweb digit flexions to improve intrinsic strength    -completed x6 level 1 x2 level 2 and x1 level 3 Uzzle to improve R UE coordination and 2 pt pinch   -completed 3pt pinch dexterity task of flipping wooden pieces over to opposite side. Pt asked to name foods that begin with letters in ascending order. Focused on dexterity and recall.       Assessment: Pt tolerated activities well. Demonstrated improved RUE proximal muscular endurance and coordination. Pt reported fatigue following session today. Pt would benefit from continued skilled occupational therapy services to improve fine motor coordination, dexterity, stregnth, UE function, and functional cognition.        Plan: Continue per plan of care.     SHORT TERM GOALS ADDED 24:  Pt will increase sustained attention by completing trail making part A in 35  seconds to complete IADLs NOT MET  Pt will increase divided attention by completing trail making part B in 1 minute 30 seconds to complete IADLs NOT MET  Pt will increase delayed recall and recall 4 /5 items on MOCA to complete IADLs GOAL MET    LONG TERM GOALS ADDED 2/20/24:  Pt will increase sustained attention by completing trail making part A in 38 seconds to complete IADLs  Pt will increase divided attention by completing trail making part B in 1 minute 10 seconds to complete IADLs  Pt will increase delayed recall and recall 5/5 items on MOCA to complete IADLs  Resume right  hand dominance to refined functional assist with all activities of daily living

## 2024-04-22 ENCOUNTER — EVALUATION (OUTPATIENT)
Facility: CLINIC | Age: 39
End: 2024-04-22
Payer: MEDICARE

## 2024-04-22 ENCOUNTER — OFFICE VISIT (OUTPATIENT)
Facility: CLINIC | Age: 39
End: 2024-04-22
Payer: MEDICARE

## 2024-04-22 DIAGNOSIS — I63.89 CEREBROVASCULAR ACCIDENT (CVA) DUE TO OTHER MECHANISM (HCC): Primary | ICD-10-CM

## 2024-04-22 DIAGNOSIS — I63.9 ACUTE CVA (CEREBROVASCULAR ACCIDENT) (HCC): Primary | ICD-10-CM

## 2024-04-22 PROCEDURE — 97112 NEUROMUSCULAR REEDUCATION: CPT | Performed by: PHYSICAL THERAPIST

## 2024-04-22 PROCEDURE — 97530 THERAPEUTIC ACTIVITIES: CPT

## 2024-04-22 NOTE — PROGRESS NOTES
"OCCUPATIONAL THERAPY RE-EVALUATION    Today's Date: 2024  Patient Name: Mateus Mckeon  : 1985  MRN: [id number]  Referring Provider: Dania Sher DO  Dx: Cerebrovascular accident (CVA) due to other mechanism (HCC) [I63.89]        SKILLED ANALYSIS:  Pt presents to re-evaluation on 24 status post CVA post angioplasty. As per interview, pt reports improvements with R UE but does not feel that his progress is where it should be. Post assessments, pt demonstrating improvements in fine motor coordination and dexterity (additional areas to be re-assessed next visit due to time constraints today: CMT, TM A&B, MAS). Pt demonstrates improvements in Fugl Harrison in areas of the hand and coordination since IE. Mild decline in wrist, and significant decline in UE. Pt demonstrates maintenance of cognition on MOCA since IE with one point decline. Pt continues to demonstrate impairments in  strength. Pt would benefit from continued OT services focusing on impairments for 8-12 more weeks. Pt educated on progress/therapy process and pt in understanding and agreement of recommendations. Recommending to continue HEP.       Pt has been on the kidney transplant list for 6 years.       Subjective:   \"I definitely see improvements, but not as much as I would like.. That's no fault of anyone here\"      PLEASE COMPLETE TM A AND B, CMT, AND MAS NEXT SESSION       PLAN OF CARE START: 24  PLAN OF CARE END: 2024  FREQUENCY: 2 times a week  PRECAUTIONS: Renal failure, actively going through dialysis; type 1 diabetes; HTN; SAH; seizure (last one was )    Subjective    Mechanism of Injury  Bill is a 38-year-old male with history of end-stage renal disease on dialysis, hypertension, type 1 diabetes, and recent subarachnoid hemorrhage   Pt reports he went to the hospital and they found a brain bleed and reported he needed an angioplasty. Unfortunately, during the angioplasty he suffered a stroke that " "affected his R UE. Since his stroke he reports his R UE function has improved although he continues with R hand FMC/dexterity deficits.   Overall, pt requires assistance with 75% of his daily activities due to complex medical presentation as well as increased fatigue post-stroke.   \"There's nothing more like purgatory than using your non dominant side.\"    From Hospital note on 1/14/24:   38-year-old man with prior history of cerebellar and pontine strokes, prothrombin gene mutation, MTHFR gene mutation, diabetes, diabetic nephropathy with ESRD on HD, who presented with headache on 1/5 and was found to have subarachnoid hemorrhage in the basilar cisterns.  Unclear etiology.  Patient family reports nausea and retching around the time of headache.     - Underwent conventional angiogram later that day which was unremarkable for any clear source.  - MRI brain on 1/8 demonstrated multiple punctate foci of ischemia in the right MCA, bilateral cerebellar hemispheres, and right velia which may represent sequela of angiograph versus embolic strokes of unclear source (ESUS)   -Repeat angiogram on 1/12 was again unremarkable for source of subarachnoid.     Neurology consulted for right upper extremity weakness noted after second angiogram.  - Repeat MRI demonstrated numerous new embolic appearing foci of ischemia with similar suspected etiology as those mentioned above.     Transcranial Dopplers have been unremarkable with Lindegaard ratios consistently less than 3.     Spontaneous basilar cistern subarachnoid hemorrhage of yet unclear etiology.      Embolic appearing strokes, suspect complication of angiogram however cannot exclude additional embolic source.        Occupational Profile  Pt lives in a home with his parents; has one flight to bedroom and has AD throughout the house (grab bars etc.). Paulo reports he needs help with preparing meals (could get by making 1 meal for himself but not all day), laundry, and driving. He " "is independent with dressing and bathing, \"but it takes it out of me. I could do some things but I need help throughout the day.\"    It is of note that the pt is actively getting dialysis; has to sit still for 4 hours  Pt reports some numbness in fingers on left side (L sided forearm is where dialysis is put in) and notes difficulty with thinking and memory since stroke. No changes in vision     He enjoys spending time with nieces and nephews and would love to get a dog.    PATIENT GOAL: \"I'd really like to get some use of my right hand.\" \"I would like to slowly get more energy.\"    HISTORY OF PRESENT ILLNESS:     PMH:   Past Medical History:   Diagnosis Date    Acute kidney injury (HCC)     Ambulates with cane     Anuria     Anxiety     Cellulitis of right elbow 03/31/2021    Chronic kidney disease     Depression     Diabetes mellitus (HCC)     Diarrhea     Emesis 10/24/2020    End stage renal disease (HCC) 02/11/2018    Formatting of this note might be different from the original. Last Assessment & Plan:  Secondary to DM.  On nightly PD.  Followed by [facility].  [facility] Formatting of this note might be different from the original. Last Assessment & Plan:  Formatting of this note might be different from the original. 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    Esophagitis 07/21/2015    Falls     Gastroparesis     GERD (gastroesophageal reflux disease)     History of shingles 2010    History of transfusion 02/2018    no adverse reaction    Hyperlipidemia     Hyperphosphatemia     Hypertension     Hypoglycemia 07/15/2022    Itching     Mastoiditis of right side 07/15/2022    Muscle weakness     general unsteadiness    Obesity (BMI 30.0-34.9) 09/09/2019    Orthostatic hypotension 10/25/2020    Peripheral polyneuropathy 11/20/2019    PONV (postoperative nausea and vomiting) 01/26/2018    Protein-calorie malnutrition (HCC) " 11/23/2020    Recurrent peritonitis (HCC) due to peritoneal dialysis catheter 07/31/2020    Retinopathy     Seizures (HCC)     early 2020 - one time    Skin abnormality     some dime size areas where skin was scratched from itching    Spontaneous bacterial peritonitis (HCC) 10/19/2020    Squamous cell skin cancer     left temple    Stroke (HCC)     x2 - off balance/no driving/fatigue    Swelling of both lower extremities     Traumatic onycholysis 07/21/2022    Vomiting     Wears glasses        Past Surgical Hx:   Past Surgical History:   Procedure Laterality Date    CARDIAC ELECTROPHYSIOLOGY PROCEDURE N/A 9/21/2023    Procedure: Cardiac loop recorder explant;  Surgeon: Parish Morgan MD;  Location: [facility];  Service: Cardiology    CARDIAC LOOP RECORDER  05/2018    COLONOSCOPY      EGD      EYE SURGERY Right     IR AV FISTULAGRAM/GRAFTOGRAM  02/23/2021    IR CEREBRAL ANGIOGRAPHY  1/12/2024    IR CEREBRAL ANGIOGRAPHY / INTERVENTION  1/5/2024    IR TUNNELED CENTRAL LINE PLACEMENT  02/16/2021    IR TUNNELED DIALYSIS CATHETER PLACEMENT  11/18/2020    IR TUNNELED DIALYSIS CATHETER REMOVAL  02/12/2021    IR TUNNELED DIALYSIS CATHETER REMOVAL  03/11/2021    MOHS SURGERY Left 12/14/2022    Left temple with Dr. Hassan    PERITONEAL CATHETER INSERTION N/A 08/27/2018    Procedure: UNROOF PD CATHETER;  Surgeon: Felipe Lindo DO;  Location: [facility];  Service: General    PA ARTERIOVENOUS ANASTOMOSIS OPEN DIRECT Left 11/09/2020    Procedure: CREATION FISTULA  ARTERIOVENOUS (AV) - LEFT WRIST;  Surgeon: Placido Altamirano MD;  Location: [facility];  Service: Vascular    PA ESOPHAGOGASTRODUODENOSCOPY TRANSORAL DIAGNOSTIC N/A 04/18/2019    Procedure: ESOPHAGOGASTRODUODENOSCOPY (EGD);  Surgeon: Ale Figueroa MD;  Location: [facility];  Service: Gastroenterology    PA LAPS INSERTION TUNNELED INTRAPERITONEAL CATHETER N/A 08/06/2018    Procedure: LAPAROSCOPIC PD CATHETER PLACEMENT;  Surgeon: Felipe Lindo DO;  Location: [facility];  Service: General    OK REMOVAL TUNNELED INTRAPERITONEAL CATHETER N/A 11/18/2020    Procedure: REMOVAL CATHETER PERITONEAL DIALYSIS;  Surgeon: Abdifatah Ty MD;  Location: [facility];  Service: General    TONSILLECTOMY      UPPER GASTROINTESTINAL ENDOSCOPY          Pain:  Location: \"In my feet from neuropathy, it's more annoying than anything.\"     Objective    Upper Extremities:  Pt is right hand dominant    Range of Motion:  AROM:   R UE   - Shoulder flexion: 90% (75%)  - Shoulder extension: 100%  - Elbow flexion 100%   - Elbow extension: 90% (100%)  - Wrist flexion: 70% (50%)  - Wrist extension: 25%  - Pronation: 100%  - Supination: 75% (50%)  - Finger extension:  75% (20%)  - Finger flexion: 75%    L UE : 100%                  ANGEL: RUE: 15lb LUE: 100lb  The age norm is approximately R: 116.8;bs and L: 112.8 lbs, indicating decreased  strength.                NINE-HOLE PEG TEST: assesses dexterity/fine motor coordination   R hand: 1 min. 31 secs. with 2 pegs dropped  (previously: all 9 pegs in and out in 1 minute and 36 seconds   L hand: 38.84 seconds (previously: 39.59 seconds)  Pt demonstrates decreased FMC compared to norms for age/sex (L: 18.62 seconds and R: 17.71 seconds)       Functional Dexterity Test: assesses patient's ability to use the hand for daily tasks requiring a 3-jaw federico prehension between the fingers and the thumb   R UE: 3 mins. 50 secs. with 7 board compensations/drops (previously: 2 minutes and 6 seconds to complete 8 with compensatory strategies including the board and 1 peg drop)   L UE:  58.39 secs. (previously: 45 seconds)   Pt demonstrates decreased dexterity compared to norms for age/sex (R: 21.4 seconds and L: 23.2 seconds)    Subluxation: NONE    Scapular winging: MINIMAL      GOALS:   Short Term Goals:  Pt will increase R UE  strength to 18 lbs to complete IADLs   Pt will increase R FMC by being able to don 5 pegs in to 9 hole peg board in 1 minute and 40 " seconds on 9 hole peg to complete ADLs and IADLs GOAL MET   Pt will increase  R UE strength to complete 8/14 of hand section of fugyl benavides for tabletop tasks NOT ASSESSED TODAY  Pt will increase  R UE strength to complete 4/6 of coordination section of fugyl benavides for tabletop tasks NOT ASSESSED TODAY        Long Term Goals: 8-12 weeks  Pt will increase R UE  strength to 25 lbs to complete IADLs   Pt will increase R FMC by being able to don all 9 pegs in to 9 hole peg board in 3 minutes on 9 hole peg to complete ADLs and IADLs GOAL MET   Pt will increase  R UE strength to complete 10/14 of hand section of fugyl benavides for tabletop tasksNOT ASSESSED TODAY  Pt will increase  R UE strength to complete 5/6 of coordination section of fugyl benavides for tabletop tasksNOT ASSESSED TODAY      OTHER PLANNED THERAPY INTERVENTIONS:   Supine, seated, and in stance neuro re-ed  Tricep AG  NMES/FES  FMC/prehension  Timed Trials  Manual tx  Hand to target  Sensory re-ed  Seated functional reach: crossing midline  Supine place and hold  WBearing strategies   Closed chain activities  Open chain activities  Internal and external memory aides        Objective Measurement Tracker:    IE (2/15/24) 1st Re-eval:   (3/19/24) 2nd Re-eval:  (4/22/24) 3rd Re-eval:     MoCA 24/30 NOT TODAY/30 23/30 /30 /30   TM Test A   To be completed next session     TM Test B   To be completed next session     CMT Immediate: 3/20, 0 confabulations  Delayed: 3/20, 0 confabulations NOT TODAY To be completed next session     Nine Hole Peg Test R hand: 3 pegs in 1 minute and 48.49 seconds   L hand: 35.14 seconds   R hand: all 9 pegs in and out in 1 minute and 36 seconds    L hand: 39.59 seconds  R hand: all 9 pegs in and out in 1 min. 31 secs. With 2 drops. L hand: 38.84 seconds.     Functional Dexterity Test NOT TODAY R UE: 2 minutes and 6 seconds to complete 8 with compensatory strategies including the board and 1 peg drop  L UE: 45 seconds  R UE: 3 mins. 50  secs. To complete with 7 drops/  compensations.   L UE: 58.39 secs.      Strength R: 15lb,   L: 100lb R: 15lbs  L: 100lbs  R: 15 lbs  L: 100 lbs     Lateral Pinch Strength NOT TODAY  Not today       2 Point Pinch Strength NOT TODAY   Not today        3 Point Pinch Strength NOT TODAY   Not today       Manual Muscle Testing             Fugl Harrison UE: 36/36  Wrist: 10/10  Hand: 6/14  Coordination: 2/6 NOT TODAY  UE: 28/36  Wrist: 7/10  Hand: 10/14  Coordination:  3/6

## 2024-04-22 NOTE — PROGRESS NOTES
"Daily Note     Today's date: 2024  Patient name: Mateus Mckeon  : 1985  MRN: [id number]  Referring provider: Dania Sher DO  Dx:   Encounter Diagnosis     ICD-10-CM    1. Acute CVA (cerebrovascular accident) (HCC)  I63.9                       POC expires Auth Status Total   Visits  Start date  Expiration date PT/OT + Visit Limit? Co-Insurance   24     bomn                                            Visit/Unit Tracking  AUTH Status:  Date                 Authed:  Used                Remaining                               Subjective: Pt reports no new changes.       Objective: See treatment diary below  *FLUID RESTRICTION- do not offer water*    HR 79 bpm, SpO2 97%     SOLO STEP:  - FWD/BCK walking, no AD: 20 feet x 10 reps  - FWD/Retro stepping over 6\" hurdlesw/ 5# TT, 1:30   - LAT stepping over 6\" haydee w/ 5# TT, 1:30   - Step up on medium river rocks: 10x 2 sets  - Resisted sit>stands (red TB): 10 reps   - FAEO/EC on foam: 3 min total  - Step ups to foam pad: 10x  - Marching on foam: 10x         Assessment: Patient tolerated treatment well. He demonstrated most difficulty with step ups to foam pad, resulting in a few LOB due to catching R foot on edge of foam pad. Improvements in stability with eyes closed on foam, ranging from 10-15 seconds at a time.   Pt would benefit from continued PT to improve balance, endurance, and reduce fall risk.       Plan: Continue per plan of care.  Focus is balance &  gait.          Outcome Measures Initial Eval  24 Re-eval  24 PN  2024      5xSTS 31.25 sec 52.20 sec no UE's    (1 LOB) 19.91 sec no UE      TUG 16.3 sec with rollator    23.6 sec no AD 20.68 sec with rollator    18.44 sec no AD 23.41 sec with rollator    12.92 sec no AD      10 meter  Next visit> 0.88 m/s      MEDRANO  Next visit> 44/56      FGA  Next visit>       6MWT 900 ft 710 ft 760 ft no AD      mCTSIB  FTEO firm  FTEC firm  FTEO foam  FTEC foam   30 sec  30 " sec  30 sec  1 sec  30 sec  30 sec  5.5 sec  defer      ABC  65% 43.75%

## 2024-04-25 ENCOUNTER — OFFICE VISIT (OUTPATIENT)
Facility: CLINIC | Age: 39
End: 2024-04-25
Payer: MEDICARE

## 2024-04-25 DIAGNOSIS — I63.9 ACUTE CVA (CEREBROVASCULAR ACCIDENT) (HCC): Primary | ICD-10-CM

## 2024-04-25 DIAGNOSIS — I63.89 CEREBROVASCULAR ACCIDENT (CVA) DUE TO OTHER MECHANISM (HCC): Primary | ICD-10-CM

## 2024-04-25 PROCEDURE — 97530 THERAPEUTIC ACTIVITIES: CPT

## 2024-04-25 PROCEDURE — 97112 NEUROMUSCULAR REEDUCATION: CPT | Performed by: PHYSICAL THERAPIST

## 2024-04-25 PROCEDURE — 97112 NEUROMUSCULAR REEDUCATION: CPT

## 2024-04-25 NOTE — PROGRESS NOTES
"Daily Note     Today's date: 2024  Patient name: Mateus Mckeon  : 1985  MRN: [id number]  Referring provider: Dania Sher DO  Dx:   Encounter Diagnosis     ICD-10-CM    1. Acute CVA (cerebrovascular accident) (HCC)  I63.9             POC expires Auth Status Total   Visits  Start date  Expiration date PT/OT + Visit Limit? Co-Insurance   24     bomn                                            Visit/Unit Tracking  AUTH Status:  Date                 Authed:  Used                Remaining                               Subjective: Pt reports no new changes, arrives from OT treatment      Objective: See treatment diary below  *FLUID RESTRICTION- do not offer water*    HR 84 bpm, SpO2 97%     SOLO STEP:  - FWD/BCK walking, no AD: 20 feet x 10 reps  - FWD/Retro stepping over 12\" haydee: 2 min  - LAT stepping over 6\" haydee: 2 min  - Incline standing on foam: 60 sec, 2x  - Step up on medium river rocks: 2 min  - FAEO/EC on foam: 3 min total  - Marching on foam: 10x         Assessment: Patient tolerated treatment well. Increased haydee height today to 12\" with patient displaying most difficulty when moving backwards due to poor foot clearance and posterior balance reactions requiring PT assist. Intervals completed today to prevent excess fatigue and maximize patient participation, completing 2 minutes with fair tolerance. Pt would benefit from continued PT to improve balance, endurance, and reduce fall risk.       Plan: Continue per plan of care.  Focus is balance &  gait.          Outcome Measures Initial Eval  24 Re-eval  24 PN  2024      5xSTS 31.25 sec 52.20 sec no UE's    (1 LOB) 19.91 sec no UE      TUG 16.3 sec with rollator    23.6 sec no AD 20.68 sec with rollator    18.44 sec no AD 23.41 sec with rollator    12.92 sec no AD      10 meter  Next visit> 0.88 m/s      MEDRANO  Next visit> 44/56      FGA  Next visit>       6MWT 900 ft 710 ft 760 ft no AD      mCTSIB  FTEO " firm  FTEC firm  FTEO foam  FTEC foam   30 sec  30 sec  30 sec  1 sec  30 sec  30 sec  5.5 sec  defer      ABC  65% 43.75%

## 2024-04-25 NOTE — PROGRESS NOTES
Daily Note     Today's date: 2024  Patient name: Mateus Mckeon  : 1985  MRN: [id number]  Referring provider: Dania Sher DO  Dx:   Encounter Diagnosis     ICD-10-CM    1. Cerebrovascular accident (CVA) due to other mechanism (HCC)  I63.89                          PLAN OF CARE START: 24  PLAN OF CARE END: 2024  FREQUENCY: 2 times a week  PRECAUTIONS: Renal failure, actively going through dialysis; type 1 diabetes; HTN; SAH; seizure (last one was ); history of 2 previous strokes      Subjective: Pt reported he would like to work on UE coordination today.     Objective: See treatment diary below  ON FLUID RESTRICTIONS--DO NOT OFFER WATER    TA:    [place] making Part A and Part B:   Part A: 60.83 with independence   Part B: 1:19.09 seconds with independence    Indicating deficits: Part A deficit > 24.40 seconds and Part B deficit > 58.46 seconds for age related norms    MODIFIED LONG SCALE:     Performed modified long scale to assess spasticity of R upper extremity:    Elbow flexors: 1/4  Elbow extensors: 0/4  Wrist flexors: 1+/4  Wrist extensors 0/4  Finger flexors: 1/4  Finger extensors: 0/4       0: No increase in muscle tone  1: Slight increase in muscle tone, manifested by a catch and release or by minimal resistance at the end of the range of motion when the affected part(s) is moved in flexion or extension  1+: Slight increase in muscle tone, manifested by a catch, followed by minimal resistance throughout the remainder (less than half) of the ROM  2: More marked increase in muscle tone through most of the ROM, but affected part(s) easily moved  3: Considerable increase in muscle tone, passive movement difficult  4: Affected part(s) rigid in flexion or extension    Provided education for resting hand splint for spasticity and gradually increasing wear time of splint to 8 hours (start at 2 and increase by 1-2 hours each week)  Provided information for applying for a  service dog   Performed IR/ER with flexbar (red) x 20 reps    NMR:  Performed lateral pushups x 20 reps for weight bearing thoruhgout RUE for propriocpetive feedback  Performed lite brite placement with pt demonstrating increased difficulty- completed takin out 30 pieces focusing on FMC.       Assessment: Pt tolerated activities well. Demonstrated improved RUE proximal muscular endurance and coordination. Pt reported fatigue following session today. Pt would benefit from continued skilled occupational therapy services to improve fine motor coordination, dexterity, stregnth, UE function, and functional cognition.        Plan: Continue per plan of care.     SHORT TERM GOALS ADDED 2/20/24:  Pt will increase sustained attention by completing trail making part A in 35 seconds to complete IADLs NOT MET  Pt will increase divided attention by completing trail making part B in 1 minute 30 seconds to complete IADLs NOT MET  Pt will increase delayed recall and recall 4 /5 items on MOCA to complete IADLs GOAL MET    LONG TERM GOALS ADDED 2/20/24:  Pt will increase sustained attention by completing trail making part A in 38 seconds to complete IADLs  Pt will increase divided attention by completing trail making part B in 1 minute 10 seconds to complete IADLs  Pt will increase delayed recall and recall 5/5 items on MOCA to complete IADLs  Resume right  hand dominance to refined functional assist with all activities of daily living

## 2024-04-27 DIAGNOSIS — F41.1 GENERALIZED ANXIETY DISORDER: ICD-10-CM

## 2024-04-27 DIAGNOSIS — F33.0 MILD EPISODE OF RECURRENT MAJOR DEPRESSIVE DISORDER (HCC): ICD-10-CM

## 2024-04-29 ENCOUNTER — OFFICE VISIT (OUTPATIENT)
Facility: CLINIC | Age: 39
End: 2024-04-29
Payer: MEDICARE

## 2024-04-29 DIAGNOSIS — I63.89 CEREBROVASCULAR ACCIDENT (CVA) DUE TO OTHER MECHANISM (HCC): Primary | ICD-10-CM

## 2024-04-29 DIAGNOSIS — I63.9 ACUTE CVA (CEREBROVASCULAR ACCIDENT) (HCC): Primary | ICD-10-CM

## 2024-04-29 PROCEDURE — 97112 NEUROMUSCULAR REEDUCATION: CPT | Performed by: PHYSICAL THERAPIST

## 2024-04-29 PROCEDURE — 97112 NEUROMUSCULAR REEDUCATION: CPT

## 2024-04-29 NOTE — PROGRESS NOTES
Daily Note     Today's date: 2024  Patient name: Mateus Mckeon  : 1985  MRN: [id number]  Referring provider: Dania Sher DO  Dx:   Encounter Diagnosis     ICD-10-CM    1. Cerebrovascular accident (CVA) due to other mechanism (HCC)  I63.89                      PLAN OF CARE START: 24  PLAN OF CARE END: 2024  FREQUENCY: 2 times a week  PRECAUTIONS: Renal failure, actively going through dialysis; type 1 diabetes; HTN; SAH; seizure (last one was 2019); history of 2 previous strokes      Subjective: Pt reported tightness in his L hand which has been going on for 1-2 days. Pt advised to work on stretching it out during the day, or follow up with PCP. Pt brought in hand splint which was adjusted today, and pt advised to wear it at night.    Objective: See treatment diary below  ON FLUID RESTRICTIONS--DO NOT OFFER WATER    NMR:    Pt performed lateral pushups x30 on vibrating dynadisk for weightbearing and proprioceptive input and massed practice elbow flexion/extension for spasticity management.    Pt performed seated rows x30 using green theraband. Focused on scapular protraction and retraction to improve scalpulohumeral rhythm and improve UE strength and coordination.     Pt performed bicep curls x30 using 6lb weighted ball. Focused on UE strengthening and elbow flexion/extension.    Pt performed Qbitz x2 using red resistive clothespin but downgraded to yellow resistive clothespin d/t difficulty with grasp. Pt required to follow visual model and insert pieces to match design. Pt then completed puzzle using his R hand to work on manipulating and rotating the cubes. Demonstrated difficulty with rotating cubes in hand, but completed task successfully. Focused on dexterity, FMC, pinch/grasp, UE strengthening, in hand manipulation.    Pt performed double spot activity seated EOM. Pt required to reach 90* R with use of R UE to retrieve pieces and insert pieces into board positioned 45* L on  tabletop. Activity upgraded to include manipulation of multiple pieces in hand to work on dexterity. Focused on FMC, dexterity, in hand manipulation, separation of hand, palm finger translation, shoulder internal/external rotation.      Assessment: Pt tolerated activities well. Demonstrated difficulty with grasp and dexterity during Q-bitz and double spot activities.  Pt would benefit from continued skilled occupational therapy services to improve fine motor coordination, dexterity, stregnth, UE function, and functional cognition.        Plan: Continue per plan of care.     SHORT TERM GOALS ADDED 2/20/24:  Pt will increase sustained attention by completing trail making part A in 35 seconds to complete IADLs NOT MET  Pt will increase divided attention by completing trail making part B in 1 minute 30 seconds to complete IADLs NOT MET  Pt will increase delayed recall and recall 4 /5 items on MOCA to complete IADLs GOAL MET    LONG TERM GOALS ADDED 2/20/24:  Pt will increase sustained attention by completing trail making part A in 38 seconds to complete IADLs  Pt will increase divided attention by completing trail making part B in 1 minute 10 seconds to complete IADLs  Pt will increase delayed recall and recall 5/5 items on MOCA to complete IADLs  Resume right  hand dominance to refined functional assist with all activities of daily living

## 2024-04-29 NOTE — PROGRESS NOTES
"Daily Note     Today's date: 2024  Patient name: Mateus Mckeon  : 1985  MRN: [id number]  Referring provider: Dania Sher DO  Dx:   Encounter Diagnosis     ICD-10-CM    1. Acute CVA (cerebrovascular accident) (HCC)  I63.9               POC expires Auth Status Total   Visits  Start date  Expiration date PT/OT + Visit Limit? Co-Insurance   24     bomn                                            Visit/Unit Tracking  AUTH Status:  Date                 Authed:  Used                Remaining                               Subjective: Pt reports no new changes, arrives from OT treatment      Objective: See treatment diary below  *FLUID RESTRICTION- do not offer water*    HR 84 bpm, SpO2 97%     SOLO STEP:  - FWD/BCK walking, no AD: 20 feet x 10 reps  - FWD/Retro stepping over 12\" haydee: 2 min  - LAT stepping over 6\" haydee: 2 min  - Step up on medium river rocks: 2 min  - Incline standing on foam: 60 sec, 2x  - Marching on foam: 60 sec, 2x   - FAEO/EC on foam: 3 min total      Assessment: Patient tolerated treatment well. Improvements in foam based activities today, as evidenced by no UE usage and no LOB. He was most challenged with stepping backwards over 12\" haydee. Pt would benefit from continued PT to improve balance, endurance, and reduce fall risk.       Plan: Continue per plan of care.  Focus is balance &  gait.          Outcome Measures Initial Eval  24 Re-eval  24 PN  2024      5xSTS 31.25 sec 52.20 sec no UE's    (1 LOB) 19.91 sec no UE      TUG 16.3 sec with rollator    23.6 sec no AD 20.68 sec with rollator    18.44 sec no AD 23.41 sec with rollator    12.92 sec no AD      10 meter  Next visit> 0.88 m/s      MEDRANO  Next visit> 44/56      FGA  Next visit>       6MWT 900 ft 710 ft 760 ft no AD      mCTSIB  FTEO firm  FTEC firm  FTEO foam  FTEC foam   30 sec  30 sec  30 sec  1 sec  30 sec  30 sec  5.5 sec  defer      ABC  65% 43.75%           "

## 2024-04-30 RX ORDER — TRAZODONE HYDROCHLORIDE 50 MG/1
TABLET ORAL
Qty: 30 TABLET | Refills: 2 | Status: SHIPPED | OUTPATIENT
Start: 2024-04-30

## 2024-05-02 ENCOUNTER — OFFICE VISIT (OUTPATIENT)
Facility: CLINIC | Age: 39
End: 2024-05-02
Payer: MEDICARE

## 2024-05-02 ENCOUNTER — EVALUATION (OUTPATIENT)
Facility: CLINIC | Age: 39
End: 2024-05-02
Payer: MEDICARE

## 2024-05-02 DIAGNOSIS — I63.89 CEREBROVASCULAR ACCIDENT (CVA) DUE TO OTHER MECHANISM (HCC): Primary | ICD-10-CM

## 2024-05-02 DIAGNOSIS — I63.9 ACUTE CVA (CEREBROVASCULAR ACCIDENT) (HCC): Primary | ICD-10-CM

## 2024-05-02 PROCEDURE — 97530 THERAPEUTIC ACTIVITIES: CPT | Performed by: PHYSICAL THERAPIST

## 2024-05-02 PROCEDURE — 97112 NEUROMUSCULAR REEDUCATION: CPT

## 2024-05-02 NOTE — PROGRESS NOTES
PT Re-Evaluation          POC expires Auth Status Total   Visits  Start date  Expiration date PT/OT + Visit Limit? Co-Insurance   24                                        Visit/Unit Tracking  AUTH Status:  Date                 Authed:  Used                Remaining                           Today's date: 2024  Patient name: Mateus Mckeon  : 1985  MRN: [id number]  Referring provider: Dania Sher DO  Dx:   Encounter Diagnosis     ICD-10-CM    1. Acute CVA (cerebrovascular accident) (HCC)  I63.9               Assessment  Assessment details: Patient is a 38 y.o. Male who presents to skilled outpatient PT with s/p CVA. He has PMH of ESRD on dialysis and has also had 2 prior CVA's. Overall LE strength is grossly 5/5 but he presents with deficits in sensation, coordination and balance. He demonstrated improvements in the following outcome measures since last reassessment: Callejas, modified CTSIB, ABC, and gait speed. His TUG score remains consistent without AD, 6MWT remains consistent, and 5x STS score remains consistent. Although he feels his stamina has worsened, he has actually maintained improvements in those areas. He is now deemed low fall risk per Callejas and gait speed, but remains high fall risk per ABC, FGA, 5x STS. He is low fall risk for TUG without AD, but fall risk with using AD due to slower speed of movement. He reports consistently walking around his house without AD, due to always having furniture or walls to hold onto. Plan to continue to focus on HIGT and HIIT as tolerated, along with balance and gait exercises. He has met 4 short-term goals at this time, and 1 long-term goal. Patient will continue to benefit from skilled PT to achieve goals below and maximize function post CVA.          Impairments: Abnormal coordination, Abnormal gait, Abnormal muscle tone, Abnormal or restricted ROM, Activity  intolerance, Impaired balance, Impaired physical strength, Lacks appropriate HEP, Poor posture, Poor body mechanics, Pain with function, Safety issue, Weight-bearing intolerance, Abnormal movement, Difficulty understanding, Abnormal muscle firing  Understanding of Dx/Px/POC: Good  Prognosis: Good      Patient verbalized understanding of POC.         Please contact me if you have any questions or recommendations. Thank you for the referral and the opportunity to share in Mateus Mckeon's care.        Plan  Plan details: PT 2-3x/week   Patient would benefit from: Skilled PT  Planned modality interventions: Biofeedback, Cryotherapy, TENS, Thermotherapy  Planned therapy interventions: Abdominal trunk stabilization, ADL training, Balance, Balance/WB training, Breathing training, Body mechanics training, Coordination, Functional ROM exercises, Gait training, HEP, Joint Mobilization, Manual Therapy, Ma taping, Motor coordination training, Neuromuscular re-education, Patient education, Postural training, Strengthening, Stretching, Therapeutic activities, Therapeutic exercises, Therapeutic training, Transfer training, Activity modification, Work reintegration  Frequency: 2-3x/wk  Duration in weeks: 12 weeks  Plan of Care beginning date: 5/14/2024  Plan of Care expiration date: 12 weeks - 8/6/2024  Treatment plan discussed with: Patient         Goals  Short Term Goals (4 weeks):    Patient will improve 6MWT by 100ft w/LRAD demonstrating significant improvements in endurance  w/in 4 weeks - NOT MET  Patient will be independent with HEP within 4 weeks - NOT MET  Patient will be able to ambulate household distances (100ft) or greater with LRAD  within 4 weeks - MET (doesn't use rollator in the house)  Patient will demonstrated improved LE strength and endurance by improved 5xSTS to 30 sec or less within 4 weeks - MET  Patient will perform Callejas to further assess static balance and fall risk. - MET  Patient will  perform 10 meter walk test to further assess gait speed and fall risk. - MET  Patient will improve ABC to 80% to demonstrate increased balance confidence and reduced fall risk. - NOT MET      Long Term Goals (12 weeks):  - Patient will be independent in a comprehensive home exercise program- NOT MET  - Patient will improve gait speed to 1.0 m/s to improve safety with community ambulation - MET  - Patient will improve MEDRANO by 6 points to facilitate return to safe independent ambulation- NOT MET  - Patient will improve scoring on FGA by 4 points to progress safety with dynamic tasks- NOT MET  - Patient will improve 6 Minute Walk Test score by 190 feet to promote improved cardiovascular endurance- NOT MET   - Patient will report going on walks at least 3 days per week to promote independence and improved cardiovascular endurance- NOT MET  - Patient will be able to ascend/descend stairs reciprocally with 1 UE assist to promote independence and safety with ADLs- NOT MET  - Patient will demonstrated improved LE strength and endurance by improved 5xSTS to 13 sec or less - NOT MET        Cut off score    All date taken from [facility] Neuro Section or Rehab Measures      [place]:  Siva et al., 2018  MDC: 2.7 pts    She rodríguez al., 2011  Cut-off score: 45/56    Chronic CVA  < 44/56 high risk for falls (Siva et al., 2018)  < 47.5/56 slow walker status (Jus et al., 2011) 5xSTS: Jean Carlos 2010  MDC: 2.3 sec  Age Norms:  60-69: 11.1 sec  70-79: 12.6 sec  80-89: 14.8 sec    Yamileth 2010, Chronic Stroke  Chronic CVA: 12 sec   TUG  Nubia rodríguez al., 2005  MDC: 2.9 sec    Cut off score:  >13.5 sec community dwelling adults  >32.2 frail elderly  <20 I for basic transfers  >30 dependent on transfers 10 Meter Walk Test:   Talia et al., 2006  Small meaningful change: 0.06 m/s  Substantial meaningful change: 0.14 m/s  MCID: 0.16 m/s    < 0.4 m/s household ambulators  0.4 - 0.8 m/s limited community ambulators  > 0.8 m/s community  "ambulators   FGA: Deepika et al., 2010  MCID: 4.2 pts  Geriatrics/community < 22/30 fall risk  Geriatrics/community < 20/30 unexplained falls DGI  MDC: vestibular - 4 pts  MDC: geriatric/community - 3 pts  Falls risk <19/24   6 Minute Walk Test  Talia et al., 2006  MDC: 60.98 m (200.01 ft)    Verónica Norwood, & Ally, 2012  MCID: 34.4 m    Age Norms  60-69: M - 1876 ft   F - 1765 ft  70-79: M - 1729 ft   F - 1545 ft  80-89: M - 1368 ft   F - 1286 ft Modified Joel  0: No increase in tone  1: Catch and release or min resistance at end range  1+: Catch f/b min resistance throughout remainder (< half ROM)  2: Easily moved, but more marked tone throughout most ROM  3: Significant tone, PROM difficult  4: Rigid   MiniBest: Glenda et al., 2013  CVA < 17.5 fall risk Pass (Acute CVA)  MDC: 1.8 points (acute), 3.2 points (chronic)         Subjective    History of Present Illness  Mechanism of injury: Pt had CVA end of January (3rd stroke). He also has ESRD and goes to dialysis 3 days/week. He mostly uses rollator but at home he furniture walks. His parents need to assist him more now than prior to stroke. He likes to spend time with nieces and nephews and attend their sporting events.     Updated (4/2/2024): Patient reports overall satisfaction with PT services thus far, feels his stamina has gotten better. He wants to continue to work on this so that he has \"the energy to do things.\"    Update (5/2/2024): Pt reports he feels like his stamina is actually getting worse. He has not been as diligent as should be with using his treadmill at home. Overall feeling more tired, not much energy. Denies any falls, but still experiences occasional loss of balance.     Primary AD: rollator (PLOF he mostly used SPC and only used rollator for long distances like going to nephew's soccer game)   Assist level at home: lives with parents who are assisting with ADL's and IADL's, but he is transferring and walking independently.   WC usage: " none  PLOF: using SPC mostly, still required some assist from parents for ADL's and IADL's   Patient goals: to walk with SPC again, work on balance, build up walking endurance     Pain  Pain in both feet due to neuropathy     Social Support  Steps to enter house: 2 NIRU  Stairs in house: full flight to 2nd floor (able to perform independently)    Lives in: 2 story home   Lives with: parents    Employment status: disabled   Hand dominance: right    Treatments  Previous treatment: PT [address]  Current treatment: PT, OT  Diagnostic Testing:       Objective     Vitals  - HR: 79 bpm  - BP: 140/70 mmHg   - SPO2: 97%    LE MMT  - R Hip Flexion: 5/5  - L Hip Flexion: 5/5  - R Hip Extension: 5/5  - L Hip Extension: 5/5  - R Hip Abduction: 5/5  - L Hip abduction: 5/5  - R Hip adduction: 5/5  - L Hip adduction: 5/5  - R Knee Extension: 5/5 - L Knee Extension: 5/5  - R Knee Flexion: 5/5  - L Knee Flexion: 5/5  - R Ankle DF: 5/5  - L Ankle DF: 5/5  - R Ankle PF: 5/5  - L Ankle PF: 5/5    Sensation  - Light touch: neuropathy both feet up to mid calf   - Temperature: diminished     Coordination  - Alternating Toe Taps: impaired  - Heel to shin: impaired     Clonus  - L: Yes  - R: Yes  - Fatiguing: Yes  - Number of beats: 1-2 beats    Vision  - Glasses: Yes  - Abnormalities prior to CVA: Yes, retinopathy (gets shots every 3 months)  - Abnormalities post CVA: No,     Neglect  - R sided: No  - L sided: No    Gait  Wide COLBY, ataxic, decreased step length      Outcome Measures Initial Eval  1/30/24 Re-eval  2/20/24 PN  4/2/2024 RE  5/2/2024     5xSTS 31.25 sec 52.20 sec no UE's    (1 LOB) 19.91 sec no UE 20.14 sec  No UE      TUG 16.3 sec with rollator    23.6 sec no AD 20.68 sec with rollator    18.44 sec no AD 23.41 sec with rollator    12.92 sec no AD 19.69 sec with rollator    12.45 sec no AD      10 meter  Next visit> 0.88 m/s 1.08 m/s     MEDRANO  Next visit> 44/56 48/56     FGA 12/30 Next visit> 12/30 12/30     6MWT 900 ft 710  ft  Rollator  760 ft no  ft  No AD      mCTSIB  FTEO firm  FTEC firm  FTEO foam  FTEC foam   Next visit> 30 sec  30 sec  5.5 sec  defer 30 sec  30 sec  30 sec  1 sec     ABC  65% 43.75% 56.88%          Precautions: Check BP's RUE only (fistula LUE)   Past Medical History:   Diagnosis Date    Acute kidney injury (HCC)     Ambulates with cane     Anuria     Anxiety     Cellulitis of right elbow 03/31/2021    Chronic kidney disease     Depression     Diabetes mellitus (HCC)     Diarrhea     Emesis 10/24/2020    End stage renal disease (HCC) 02/11/2018    Formatting of this note might be different from the original. Last Assessment & Plan:  Secondary to DM.  On nightly PD.  Followed by [facility].  Patient considering transplant for kidney and pancreas through [facility] Formatting of this note might be different from the original. Last Assessment & Plan:  Formatting of this note might be different from the original. 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    Esophagitis 07/21/2015    Falls     Gastroparesis     GERD (gastroesophageal reflux disease)     History of shingles 2010    History of transfusion 02/2018    no adverse reaction    Hyperlipidemia     Hyperphosphatemia     Hypertension     Hypoglycemia 07/15/2022    Itching     Mastoiditis of right side 07/15/2022    Muscle weakness     general unsteadiness    Obesity (BMI 30.0-34.9) 09/09/2019    Orthostatic hypotension 10/25/2020    Peripheral polyneuropathy 11/20/2019    PONV (postoperative nausea and vomiting) 01/26/2018    Protein-calorie malnutrition (HCC) 11/23/2020    Recurrent peritonitis (HCC) due to peritoneal dialysis catheter 07/31/2020    Retinopathy     Seizures (HCC)     early 2020 - one time    Skin abnormality     some dime size areas where skin was scratched from itching    Spontaneous bacterial peritonitis (HCC) 10/19/2020    Squamous cell skin cancer     left temple    Stroke (HCC)     x2 - off balance/no  driving/fatigue    Swelling of both lower extremities     Traumatic onycholysis 07/21/2022    Vomiting     Wears glasses

## 2024-05-02 NOTE — PROGRESS NOTES
"Daily Note     Today's date: 2024  Patient name: Mateus Mckeon  : 1985  MRN: [id number]  Referring provider: Dania Sher DO  Dx:   Encounter Diagnosis     ICD-10-CM    1. Cerebrovascular accident (CVA) due to other mechanism (HCC)  I63.89         Start Time: 0803  Stop Time: 0845  Total time in clinic (min): 42 minutes      PLAN OF CARE START: 24  PLAN OF CARE END: 2024  FREQUENCY: 2 times a week  PRECAUTIONS: Renal failure, actively going through dialysis; type 1 diabetes; HTN; SAH; seizure (last one was ); history of 2 previous strokes      Subjective: Nothing significant reported today.     Objective: See treatment diary below  ON FLUID RESTRICTIONS--DO NOT OFFER WATER    NMR:  Standing push ups at parallel bars with focus on slow controlled movement. Proximal strengthening and proprioceptive input thru the R UE.     ER using red band bilaterally - Holding cone with the R UE. 2x10 sets. Working on rotor cuff strengthening to improve stability with reaching.     Body blade 2 sets of 30 seconds anterior/posterior with arm extended, then performed 2 sets of 30 seconds with elbow at 90 going medial/lateral. Focus on shoulder stability, muscular endurance, motor control.     Pt performed seated rows 3x10 using green theraband with pt holding cone. Focused on scapular protraction/retraction, elbow flexion/extension. Focus on UE strength, motor control.     Dexterciser with R UE extended, 8 repetitions with multiple rest breaks due to fatigue reported proximally.     Performed activity with pt stacking cup 3/4\" wooden blocks by using yellow clothes pin to . Working on UE coordination, distal control, hand strength, FMC.      Assessment: Pt tolerated activities well. Fatigue in the R shoulder with stability exercises today.  Pt would benefit from continued skilled occupational therapy services to improve fine motor coordination, dexterity, stregnth, UE function, and " functional cognition.    Plan: Continue per plan of care.     SHORT TERM GOALS ADDED 2/20/24:  Pt will increase sustained attention by completing trail making part A in 35 seconds to complete IADLs NOT MET  Pt will increase divided attention by completing trail making part B in 1 minute 30 seconds to complete IADLs NOT MET  Pt will increase delayed recall and recall 4 /5 items on MOCA to complete IADLs GOAL MET    LONG TERM GOALS ADDED 2/20/24:  Pt will increase sustained attention by completing trail making part A in 38 seconds to complete IADLs  Pt will increase divided attention by completing trail making part B in 1 minute 10 seconds to complete IADLs  Pt will increase delayed recall and recall 5/5 items on MOCA to complete IADLs  Resume right  hand dominance to refined functional assist with all activities of daily living

## 2024-05-06 ENCOUNTER — OFFICE VISIT (OUTPATIENT)
Facility: CLINIC | Age: 39
End: 2024-05-06
Payer: MEDICARE

## 2024-05-06 DIAGNOSIS — I63.89 CEREBROVASCULAR ACCIDENT (CVA) DUE TO OTHER MECHANISM (HCC): Primary | ICD-10-CM

## 2024-05-06 DIAGNOSIS — I63.9 ACUTE CVA (CEREBROVASCULAR ACCIDENT) (HCC): Primary | ICD-10-CM

## 2024-05-06 PROCEDURE — 97112 NEUROMUSCULAR REEDUCATION: CPT

## 2024-05-06 NOTE — PROGRESS NOTES
Daily Note     Today's date: 2024  Patient name: Mateus Mckeon  : 1985  MRN: [id number]  Referring provider: Dania Sher DO  Dx:   Encounter Diagnosis     ICD-10-CM    1. Cerebrovascular accident (CVA) due to other mechanism (HCC)  I63.89         Start Time: 0800  Stop Time: 0845  Total time in clinic (min): 45 minutes      PLAN OF CARE START: 24  PLAN OF CARE END: 2024  FREQUENCY: 2 times a week  PRECAUTIONS: Renal failure, actively going through dialysis; type 1 diabetes; HTN; SAH; seizure (last one was 2019); history of 2 previous strokes      Subjective: Nothing significant reported today.     Objective: See treatment diary below  ON FLUID RESTRICTIONS--DO NOT OFFER WATER    NMR:  Standing push ups at parallel bars with focus on slow controlled movement. Proximal strengthening and proprioceptive input thru the R UE.     ER using red band bilaterally - Holding cone with the R UE. 2x10 sets. Working on rotor cuff strengthening to improve stability with reaching.     Body blade 2 sets of 30 seconds anterior/posterior with arm extended, then performed 2 sets of 30 seconds with elbow at 90 going medial/lateral. Focus on shoulder stability, muscular endurance, motor control.     Pt performed seated rows 3x10 using green theraband with pt holding cone. Focused on scapular protraction/retraction, elbow flexion/extension. Focus on UE strength, motor control.     Performed clothes pin activity while seated at EOM. Started with yellow clothes pins and progressed up to blue. Pt retrieving from L side of mat with R UE, then placing on clothes pin rack in various positions. Pt counting backwards by 3's for each pin placed onto the board to address sustained attention, working memory, dual tasking. Focus of activity on R UE coordination, functional endurance, crossing midline with trunk rotation, FMC, hand strength.       Assessment: Pt tolerated activities well. Fatigue in the R shoulder  following exercises today.  Pt would benefit from continued skilled occupational therapy services to improve fine motor coordination, dexterity, stregnth, UE function, and functional cognition.    Plan: Continue per plan of care.     SHORT TERM GOALS ADDED 2/20/24:  Pt will increase sustained attention by completing trail making part A in 35 seconds to complete IADLs NOT MET  Pt will increase divided attention by completing trail making part B in 1 minute 30 seconds to complete IADLs NOT MET  Pt will increase delayed recall and recall 4 /5 items on MOCA to complete IADLs GOAL MET    LONG TERM GOALS ADDED 2/20/24:  Pt will increase sustained attention by completing trail making part A in 38 seconds to complete IADLs  Pt will increase divided attention by completing trail making part B in 1 minute 10 seconds to complete IADLs  Pt will increase delayed recall and recall 5/5 items on MOCA to complete IADLs  Resume right  hand dominance to refined functional assist with all activities of daily living

## 2024-05-06 NOTE — PROGRESS NOTES
Daily Note     Today's date: 2024  Patient name: Mateus Mckeon  : 1985  MRN: [id number]  Referring provider: Dania Sher DO  Dx:   Encounter Diagnosis     ICD-10-CM    1. Acute CVA (cerebrovascular accident) (HCC)  I63.9               POC expires Auth Status Total   Visits  Start date  Expiration date PT/OT + Visit Limit? Co-Insurance   24     bomn                                            Visit/Unit Tracking  AUTH Status:  Date                 Authed:  Used                Remaining                               Subjective: Patient reports to PT session from OT; reports no new issues or complaints.       Objective: See treatment diary below  *FLUID RESTRICTION- do not offer water*    HR 75 bpm, SpO2 93%     NMR (3# ankle weights - SOLO STEP):  - Treadmill ambulation @ 1.7 mph x 5 minutes  - FWD/BWD ambulation to blaze pod taps (focus mode) x 2 minutes  - Sidestepping to blaze pod taps (focus mode) x 2 minutes  - Standing on foam + reaching outside COLBY to place rings with facility dog (7 rings)  - Step up on medium river rocks: 2 min      Assessment: Patient tolerated treatment session fairly today with continued focus on balance, strength, and mobility. Incorporated treadmill training to address patient's reported goal of increasing stamina, as discussed at last visit during reassessment. Additionally introduced ankle weights to further drive intensity, as indicated in maximizing degree of recovery and neuroplasticity post-CVA. Discussed incorporating treadmill ambulation as part of HEP to further improve endurance, particularly on off days of patient's dialysis schedule. Patient would benefit from continued PT to improve balance, endurance, and reduce fall risk.       Plan: Continue per plan of care.  Focus is balance & gait.          Outcome Measures Initial Eval  24 Re-eval  24 PN  2024 RE  2024     5xSTS 31.25 sec 52.20 sec no UE's    (1 LOB) 19.91 sec no UE  20.14 sec  No UE      TUG 16.3 sec with rollator    23.6 sec no AD 20.68 sec with rollator    18.44 sec no AD 23.41 sec with rollator    12.92 sec no AD 19.69 sec with rollator    12.45 sec no AD      10 meter  Next visit> 0.88 m/s 1.08 m/s     MEDRANO  Next visit> 44/56 48/56     FGA 12/30 Next visit> 12/30 12/30     6MWT 900 ft 710 ft  Rollator  760 ft no  ft  No AD      mCTSIB  FTEO firm  FTEC firm  FTEO foam  FTEC foam   Next visit> 30 sec  30 sec  5.5 sec  defer 30 sec  30 sec  30 sec  1 sec     ABC  65% 43.75% 56.88%

## 2024-05-09 ENCOUNTER — OFFICE VISIT (OUTPATIENT)
Facility: CLINIC | Age: 39
End: 2024-05-09
Payer: MEDICARE

## 2024-05-09 DIAGNOSIS — I63.89 CEREBROVASCULAR ACCIDENT (CVA) DUE TO OTHER MECHANISM (HCC): Primary | ICD-10-CM

## 2024-05-09 DIAGNOSIS — I63.9 ACUTE CVA (CEREBROVASCULAR ACCIDENT) (HCC): Primary | ICD-10-CM

## 2024-05-09 PROCEDURE — 97112 NEUROMUSCULAR REEDUCATION: CPT

## 2024-05-09 NOTE — PROGRESS NOTES
Daily Note     Today's date: 2024  Patient name: Mateus Mckeon  : 1985  MRN: [id number]  Referring provider: Dania Sher DO  Dx:   Encounter Diagnosis     ICD-10-CM    1. Acute CVA (cerebrovascular accident) (HCC)  I63.9                 POC expires Auth Status Total   Visits  Start date  Expiration date PT/OT + Visit Limit? Co-Insurance   24     bomn                                            Visit/Unit Tracking  AUTH Status:  Date                 Authed:  Used                Remaining                               Subjective: Patient reports to PT session from OT; reports no new issues or complaints.       Objective: See treatment diary below  *FLUID RESTRICTION- do not offer water*    HR 71 bpm, SpO2 96%     NMR (3# ankle weights - SOLO STEP):  - Treadmill ambulation @ 1.5 mph: 5 minutes  - Treadmill ambulation @ 1.7 mph, 2% incline: 3 minutes  - FWD/BWD ambulation to blaze pod taps (random mode) x 3 minutes  - Sidestepping to blaze pod taps (random mode) x 3 minutes  - Step-ups from foam pad to medium RR: 10x    Assessment: Patient tolerated treatment session fairly today with continued focus on balance, strength, and mobility. Progressed treadmill ambulation and duration of overground activities to further challenge patients endurance and tolerance to activity. He was most challenged while on compliant surfaces as he demonstrated increased A-P and lateral sway suggesting remaining deficits in somatosensory awareness. Plan to continue to progress activities to target endurance and balance in future sessions. Patient would benefit from continued PT to improve balance, endurance, and reduce fall risk.       Plan: Continue per plan of care.  Focus is balance & gait.          Outcome Measures Initial Eval  24 Re-eval  24 PN  2024 RE  2024     5xSTS 31.25 sec 52.20 sec no UE's    (1 LOB) 19.91 sec no UE 20.14 sec  No UE      TUG 16.3 sec with rollator    23.6 sec no AD  20.68 sec with rollator    18.44 sec no AD 23.41 sec with rollator    12.92 sec no AD 19.69 sec with rollator    12.45 sec no AD      10 meter  Next visit> 0.88 m/s 1.08 m/s     MEDRANO  Next visit> 44/56 48/56     FGA 12/30 Next visit> 12/30 12/30     6MWT 900 ft 710 ft  Rollator  760 ft no  ft  No AD      mCTSIB  FTEO firm  FTEC firm  FTEO foam  FTEC foam   Next visit> 30 sec  30 sec  5.5 sec  defer 30 sec  30 sec  30 sec  1 sec     ABC  65% 43.75% 56.88%

## 2024-05-09 NOTE — PROGRESS NOTES
Daily Note     Today's date: 2024  Patient name: Mateus Mckeon  : 1985  MRN: [id number]  Referring provider: Dania Sher DO  Dx:   Encounter Diagnosis     ICD-10-CM    1. Cerebrovascular accident (CVA) due to other mechanism (HCC)  I63.89         Start Time: 0845  Stop Time: 0930  Total time in clinic (min): 45 minutes      PLAN OF CARE START: 24  PLAN OF CARE END: 2024  FREQUENCY: 2 times a week  PRECAUTIONS: Renal failure, actively going through dialysis; type 1 diabetes; HTN; SAH; seizure (last one was 2019); history of 2 previous strokes      Subjective: Pt was more tired today after having PT before OT.     Objective: See treatment diary below  ON FLUID RESTRICTIONS--DO NOT OFFER WATER    NMR:  -NuStep level 1 for 5 minutes with ~2 min rest break in the middle to address endurance, proprioceptive input, R UE strengthening.     -Reverse fly's with yellow  in seated for scapular retraction, external rotation, motor control. 3x10 sets.     -Yellow flex bar in seated bending 20x downward with pt stating names of TV shows for each repetition, then 20x upward and stating names of bands. Working on  strength, pronation, supination, dual tasking. Pt required mod cues for word finding and significantly increased time.     -Cameron mosaic board with tweezers in standing to address R hand FMC, graded control, functional activity tolerance, hand endurance. Performed 30 repetitions with moderate fatigue.     Assessment: Pt tolerated activities well. General fatigue reported post-session today.  Pt would benefit from continued skilled occupational therapy services to improve fine motor coordination, dexterity, stregnth, UE function, and functional cognition.    Plan: Continue per plan of care.     SHORT TERM GOALS ADDED 24:  Pt will increase sustained attention by completing trail making part A in 35 seconds to complete IADLs NOT MET  Pt will increase divided attention by  completing trail making part B in 1 minute 30 seconds to complete IADLs NOT MET  Pt will increase delayed recall and recall 4 /5 items on MOCA to complete IADLs GOAL MET    LONG TERM GOALS ADDED 2/20/24:  Pt will increase sustained attention by completing trail making part A in 38 seconds to complete IADLs  Pt will increase divided attention by completing trail making part B in 1 minute 10 seconds to complete IADLs  Pt will increase delayed recall and recall 5/5 items on MOCA to complete IADLs  Resume right  hand dominance to refined functional assist with all activities of daily living

## 2024-05-13 ENCOUNTER — OFFICE VISIT (OUTPATIENT)
Facility: CLINIC | Age: 39
End: 2024-05-13
Payer: MEDICARE

## 2024-05-13 DIAGNOSIS — I63.9 ACUTE CVA (CEREBROVASCULAR ACCIDENT) (HCC): Primary | ICD-10-CM

## 2024-05-13 DIAGNOSIS — I63.89 CEREBROVASCULAR ACCIDENT (CVA) DUE TO OTHER MECHANISM (HCC): Primary | ICD-10-CM

## 2024-05-13 PROCEDURE — 97112 NEUROMUSCULAR REEDUCATION: CPT

## 2024-05-13 NOTE — PROGRESS NOTES
Daily Note     Today's date: 2024  Patient name: Mateus Mckeon  : 1985  MRN: [id number]  Referring provider: Dania Sher DO  Dx:   Encounter Diagnosis     ICD-10-CM    1. Cerebrovascular accident (CVA) due to other mechanism (HCC)  I63.89         Start Time: 0805  Stop Time: 0845  Total time in clinic (min): 40 minutes      PLAN OF CARE START: 24  PLAN OF CARE END: 2024  FREQUENCY: 2 times a week  PRECAUTIONS: Renal failure, actively going through dialysis; type 1 diabetes; HTN; SAH; seizure (last one was 2019); history of 2 previous strokes      Subjective: Pt was more tired today after having PT before OT.     Objective: See treatment diary below  ON FLUID RESTRICTIONS--DO NOT OFFER WATER    NMR:  -NuStep level 1 for 5 minutes with to address endurance, proprioceptive input, R UE strengthening. No rest break today.     -Standing bent over rows with L UE supported on mat and R UE holding 2 lb weight. Performed 2 sets of 10 followed by 1 set of 5 - stopped due to fatigue. Focus on UE strength, endurance, sustained grasp, motor control.     -Bilateral external rotation with yellow  in seated for scapular retraction, external rotation, motor control. 3x10 sets.     -Connect 4 activity with pt seated at table,  the pieces with the R UE and place into the board following pattern provided and naming cities starting with each letter shown on the pattern. Activity focused on R UE endurance, FMC, dexterity.     Performed Qbitz puzzle using red clothes pin to  and manipulate all pieces before placing into the board. Focus on FMC, hand strength,  skills, problem solving.     Assessment: Pt tolerated activities well. Continues to fatigue quickly in session, however did require less rest breaks during today's session.  Pt would benefit from continued skilled occupational therapy services to improve fine motor coordination, dexterity, stregnth, UE function, and functional  cognition.    Plan: Continue per plan of care.     SHORT TERM GOALS ADDED 2/20/24:  Pt will increase sustained attention by completing trail making part A in 35 seconds to complete IADLs NOT MET  Pt will increase divided attention by completing trail making part B in 1 minute 30 seconds to complete IADLs NOT MET  Pt will increase delayed recall and recall 4 /5 items on MOCA to complete IADLs GOAL MET    LONG TERM GOALS ADDED 2/20/24:  Pt will increase sustained attention by completing trail making part A in 38 seconds to complete IADLs  Pt will increase divided attention by completing trail making part B in 1 minute 10 seconds to complete IADLs  Pt will increase delayed recall and recall 5/5 items on MOCA to complete IADLs  Resume right  hand dominance to refined functional assist with all activities of daily living

## 2024-05-13 NOTE — PROGRESS NOTES
"Daily Note     Today's date: 2024  Patient name: Mateus Mckeon  : 1985  MRN: [id number]  Referring provider: Dania Sher DO  Dx:   Encounter Diagnosis     ICD-10-CM    1. Acute CVA (cerebrovascular accident) (HCC)  I63.9                 POC expires Auth Status Total   Visits  Start date  Expiration date PT/OT + Visit Limit? Co-Insurance   24     bomn                                                         Subjective: Patient reports to PT session from OT; reports no new issues or complaints.       Objective: See treatment diary below  *FLUID RESTRICTION- do not offer water*    HR 79 bpm, SpO2 99%     NMR (3# ankle weights - SOLO STEP):  - Treadmill ambulation @ 1.5 mph: 10 minutes  - Overground ambulation + 10 # TT hold x 300 ft  - FWD/BWD ambulation to blaze pod taps (random mode) x 2 minutes  - Sidestepping to blaze pod taps (random mode) x 2 minutes  - Step ups (6\") to opposite knee drive x 2 minutes; 1 UE      Assessment: Patient tolerated treatment session fairly today with continued focus on balance, strength, and mobility. Patient able to continue to progress treadmill ambulation training. Instructed patient for home treadmill ambulation to attempt to reach moderate intensity pace by determining if he is able to hold a conversation, but it would be difficult. Continue to challenge patient with coordination and agility based tasks. Patient would benefit from continued PT to improve balance, endurance, and reduce fall risk.       Plan: Continue per plan of care.  Focus is balance & gait.          Outcome Measures Initial Eval  24 Re-eval  24 PN  2024 RE  2024     5xSTS 31.25 sec 52.20 sec no UE's    (1 LOB) 19.91 sec no UE 20.14 sec  No UE      TUG 16.3 sec with rollator    23.6 sec no AD 20.68 sec with rollator    18.44 sec no AD 23.41 sec with rollator    12.92 sec no AD 19.69 sec with rollator    12.45 sec no AD      10 meter  Next visit> 0.88 m/s 1.08 m/s   "   MEDRANO  Next visit> 44/56 48/56     FGA 12/30 Next visit> 12/30 12/30     6MWT 900 ft 710 ft  Rollator  760 ft no  ft  No AD      mCTSIB  FTEO firm  FTEC firm  FTEO foam  FTEC foam   Next visit> 30 sec  30 sec  5.5 sec  defer 30 sec  30 sec  30 sec  1 sec     ABC  65% 43.75% 56.88%

## 2024-05-16 ENCOUNTER — OFFICE VISIT (OUTPATIENT)
Facility: CLINIC | Age: 39
End: 2024-05-16
Payer: MEDICARE

## 2024-05-16 DIAGNOSIS — I63.9 ACUTE CVA (CEREBROVASCULAR ACCIDENT) (HCC): Primary | ICD-10-CM

## 2024-05-16 DIAGNOSIS — I63.89 CEREBROVASCULAR ACCIDENT (CVA) DUE TO OTHER MECHANISM (HCC): Primary | ICD-10-CM

## 2024-05-16 PROCEDURE — 97112 NEUROMUSCULAR REEDUCATION: CPT

## 2024-05-16 PROCEDURE — 97112 NEUROMUSCULAR REEDUCATION: CPT | Performed by: PHYSICAL THERAPIST

## 2024-05-16 NOTE — PROGRESS NOTES
"Daily Note     Today's date: 2024  Patient name: Mateus Mckeon  : 1985  MRN: [id number]  Referring provider: Dania Sher DO  Dx:   Encounter Diagnosis     ICD-10-CM    1. Acute CVA (cerebrovascular accident) (HCC)  I63.9                   POC expires Auth Status Total   Visits  Start date  Expiration date PT/OT + Visit Limit? Co-Insurance   24     bomn                                                         Subjective: Patient reports he had a rough morning, threw up when he woke up.Feeling better now.       Objective: See treatment diary below  *FLUID RESTRICTION- do not offer water*    HR 79 bpm, SpO2 99%     NMR (3# ankle weights - SOLO STEP):  - Fwd/bwd walking 10x (no ankle weights)  - FWD/BWD ambulation to blaze pod taps holding 10# tidal tank (random mode) x 2 minutes  - Sidestepping to blaze pod taps (random mode) x 2 minutes  - Agility ladder fwd in/out x 3 laps   - Agility ladder lateral in/out x 2 laps   - Step ups (6\") to opposite knee drive x 2 minutes; 1 UE      Assessment: Patient tolerated treatment session well today with continued focus on balance, strength, and mobility. He reports using treadmill consistently at home so held today. He had some posterior LOB during step ups with high knee drive, otherwise no LOB during ambulation. Introduced agility ladder today, no LOB but just slowed speed. He continues to require frequent rest breaks t/o session due to decreased endurance. Patient would benefit from continued PT to improve balance, endurance, and reduce fall risk.       Plan: Continue per plan of care.  Focus is balance & gait.          Outcome Measures Initial Eval  24 Re-eval  24 PN  2024 RE  2024     5xSTS 31.25 sec 52.20 sec no UE's    (1 LOB) 19.91 sec no UE 20.14 sec  No UE      TUG 16.3 sec with rollator    23.6 sec no AD 20.68 sec with rollator    18.44 sec no AD 23.41 sec with rollator    12.92 sec no AD 19.69 sec with rollator    12.45 " sec no AD      10 meter  Next visit> 0.88 m/s 1.08 m/s     MEDRANO  Next visit> 44/56 48/56     FGA 12/30 Next visit> 12/30 12/30     6MWT 900 ft 710 ft  Rollator  760 ft no  ft  No AD      mCTSIB  FTEO firm  FTEC firm  FTEO foam  FTEC foam   Next visit> 30 sec  30 sec  5.5 sec  defer 30 sec  30 sec  30 sec  1 sec     ABC  65% 43.75% 56.88%

## 2024-05-16 NOTE — PROGRESS NOTES
Daily Note     Today's date: 2024  Patient name: Mateus Mckeon  : 1985  MRN: [id number]  Referring provider: Dania Sher DO  Dx:   Encounter Diagnosis     ICD-10-CM    1. Cerebrovascular accident (CVA) due to other mechanism (HCC)  I63.89         Start Time: 0845  Stop Time: 0930  Total time in clinic (min): 45 minutes      PLAN OF CARE START: 24  PLAN OF CARE END: 2024  FREQUENCY: 2 times a week  PRECAUTIONS: Renal failure, actively going through dialysis; type 1 diabetes; HTN; SAH; seizure (last one was 2019); history of 2 previous strokes      Subjective: Pt stated he was very tired as he vomited this morning.     Objective: See treatment diary below  ON FLUID RESTRICTIONS--DO NOT OFFER WATER    NMR:  -NuStep level 1 for 5 minutes with to address endurance, proprioceptive input, R UE strengthening. Required multiple rest breaks  -Needle threads in standing with R UE support on airex mat with fingers over the edge to provide proprioceptive input and work on shoulder stability  -mini push ups in standing with R UE support on airex mat with fingers over the edge to provide proprioceptive input and work on R UE strength  -Pt reaching out with R UE holding brush to brush the therapy dog - focusing on shoulder flexion and full ebow extension. Able to complete ~10 reps at a time with rest break. 3 sets.   -Pt in seated and reaching out to pet therapy dog under his chip using supination and finger flexion/extension performed 2x10 sets.     Assessment: Pt tolerated activities fairly. Continues to fatigue quickly in session, more so today as pt stated he did not have a good morning.  Pt would benefit from continued skilled occupational therapy services to improve fine motor coordination, dexterity, stregnth, UE function, and functional cognition.    Plan: Continue per plan of care.     SHORT TERM GOALS ADDED 24:  Pt will increase sustained attention by completing trail making part  A in 35 seconds to complete IADLs NOT MET  Pt will increase divided attention by completing trail making part B in 1 minute 30 seconds to complete IADLs NOT MET  Pt will increase delayed recall and recall 4 /5 items on MOCA to complete IADLs GOAL MET    LONG TERM GOALS ADDED 2/20/24:  Pt will increase sustained attention by completing trail making part A in 38 seconds to complete IADLs  Pt will increase divided attention by completing trail making part B in 1 minute 10 seconds to complete IADLs  Pt will increase delayed recall and recall 5/5 items on MOCA to complete IADLs  Resume right  hand dominance to refined functional assist with all activities of daily living

## 2024-05-17 ENCOUNTER — OFFICE VISIT (OUTPATIENT)
Dept: INTERNAL MEDICINE CLINIC | Facility: CLINIC | Age: 39
End: 2024-05-17
Payer: MEDICARE

## 2024-05-17 VITALS
BODY MASS INDEX: 33.32 KG/M2 | DIASTOLIC BLOOD PRESSURE: 78 MMHG | WEIGHT: 200 LBS | HEIGHT: 65 IN | RESPIRATION RATE: 16 BRPM | HEART RATE: 76 BPM | OXYGEN SATURATION: 97 % | SYSTOLIC BLOOD PRESSURE: 140 MMHG

## 2024-05-17 DIAGNOSIS — L29.9 PRURITUS: ICD-10-CM

## 2024-05-17 DIAGNOSIS — K31.84 GASTROPARESIS DIABETICORUM  (HCC): Primary | Chronic | ICD-10-CM

## 2024-05-17 DIAGNOSIS — F33.1 MODERATE EPISODE OF RECURRENT MAJOR DEPRESSIVE DISORDER (HCC): ICD-10-CM

## 2024-05-17 DIAGNOSIS — G47.33 OSA (OBSTRUCTIVE SLEEP APNEA): Chronic | ICD-10-CM

## 2024-05-17 DIAGNOSIS — E11.43 GASTROPARESIS DIABETICORUM  (HCC): Primary | Chronic | ICD-10-CM

## 2024-05-17 DIAGNOSIS — I15.0 RENOVASCULAR HYPERTENSION: Chronic | ICD-10-CM

## 2024-05-17 DIAGNOSIS — Z00.00 MEDICARE ANNUAL WELLNESS VISIT, SUBSEQUENT: ICD-10-CM

## 2024-05-17 DIAGNOSIS — E03.9 HYPOTHYROIDISM, UNSPECIFIED TYPE: Chronic | ICD-10-CM

## 2024-05-17 PROBLEM — F33.0 MILD EPISODE OF RECURRENT MAJOR DEPRESSIVE DISORDER (HCC): Status: RESOLVED | Noted: 2021-07-01 | Resolved: 2024-05-17

## 2024-05-17 PROCEDURE — 99214 OFFICE O/P EST MOD 30 MIN: CPT | Performed by: INTERNAL MEDICINE

## 2024-05-17 PROCEDURE — G0439 PPPS, SUBSEQ VISIT: HCPCS | Performed by: INTERNAL MEDICINE

## 2024-05-17 NOTE — ASSESSMENT & PLAN NOTE
-DM1 with gastroparesis  Lab Results   Component Value Date    HGBA1C 6.0 (H) 01/14/2024   -was prescribed reglan prn, advised can try to switch form zofran to reglan for n/v

## 2024-05-17 NOTE — PROGRESS NOTES
Ambulatory Visit  Name: Mateus Mckeon      : 1985      MRN: [id number]  Encounter Provider: Dania Sher DO  Encounter Date: 2024   Encounter department: [facility] INTERNAL MEDICINE    Assessment & Plan   {There are no diagnoses linked to this encounter. (Refresh or delete this SmartLink)}     Preventive health issues were discussed with patient, and age appropriate screening tests were ordered as noted in patient's After Visit Summary. Personalized health advice and appropriate referrals for health education or preventive services given if needed, as noted in patient's After Visit Summary.    History of Present Illness   {Disappearing Hyperlinks I Encounters * My Last Note * Since Last Visit * History :35738}  HPI   Patient Care Team:  Dania Sher DO as PCP - General  Dania Sher DO as PCP - PCP-[facility] ([facility])  Bryan Justice MD (Neurology)  Magan Bridges MD (Ophthalmology)  Ale Figueroa MD (Gastroenterology)  Douglas Myrick MD (Internal Medicine)  Ceasar Sparrow MD (Ophthalmology)  Sudarshan Zaragoza MD (Pulmonary Disease)  Parag Belle MD (Cardiology)  Michael Bromberg, MD (Internal Medicine)  Anibal Burt MD (Nephrology)  Miguel Ford DPM (Podiatry)  Maya Riley MD (Nephrology)    Review of Systems  Medical History Reviewed by provider this encounter:       Annual Wellness Visit Questionnaire   Annual Wellness Visit  Social Determinants of Health     Financial Resource Strain: Low Risk  (2023)    Overall Financial Resource Strain (CARDIA)    • Difficulty of Paying Living Expenses: Not hard at all   Food Insecurity: No Food Insecurity (2024)    Hunger Vital Sign    • Worried About Running Out of Food in the Last Year: Never true    • Ran Out of Food in the Last Year: Never true   Transportation Needs: No Transportation Needs (2024)    PRAPARE - Transportation    • Lack of Transportation (Medical): No    •  "Lack of Transportation (Non-Medical): No   Housing Stability: Low Risk  (5/17/2024)    Housing Stability Vital Sign    • Unable to Pay for Housing in the Last Year: No    • Number of Times Moved in the Last Year: 0    • Homeless in the Last Year: No   Utilities: Not At Risk (5/17/2024)    [facility] Utilities    • Threatened with loss of utilities: No     No results found.    Objective   {Disappearing Hyperlinks   Review Vitals * Enter New Vitals * Results Review * Labs * Imaging * Cardiology * Procedures * Lung Cancer Screening :59485}  Pulse 76   Resp 16   Ht 5' 5\" (1.651 m)   Wt 90.7 kg (200 lb)   SpO2 97%   BMI 33.28 kg/m²     Physical Exam  Administrative Statements {Disappearing Hyperlinks I  Level of Service * PCMH/PCSP:43494}  {Time Spent Statement (Optional):35852}        "

## 2024-05-17 NOTE — PATIENT INSTRUCTIONS
Medicare Preventive Visit Patient Instructions  Thank you for completing your Welcome to Medicare Visit or Medicare Annual Wellness Visit today. Your next wellness visit will be due in one year (5/18/2025).  The screening/preventive services that you may require over the next 5-10 years are detailed below. Some tests may not apply to you based off risk factors and/or age. Screening tests ordered at today's visit but not completed yet may show as past due. Also, please note that scanned in results may not display below.  Preventive Screenings:  Service Recommendations Previous Testing/Comments   Colorectal Cancer Screening  Colonoscopy    Fecal Occult Blood Test (FOBT)/Fecal Immunochemical Test (FIT)  Fecal DNA/Cologuard Test  Flexible Sigmoidoscopy Age: 45-75 years old   Colonoscopy: every 10 years (May be performed more frequently if at higher risk)  OR  FOBT/FIT: every 1 year  OR  Cologuard: every 3 years  OR  Sigmoidoscopy: every 5 years  Screening may be recommended earlier than age 45 if at higher risk for colorectal cancer. Also, an individualized decision between you and your healthcare provider will decide whether screening between the ages of 76-85 would be appropriate. Colonoscopy: 06/03/2021  FOBT/FIT: Not on file  Cologuard: Not on file  Sigmoidoscopy: Not on file    Screening Current     Prostate Cancer Screening Individualized decision between patient and health care provider in men between ages of 55-69   Medicare will cover every 12 months beginning on the day after your 50th birthday PSA: No results in last 5 years     Screening Not Indicated     Hepatitis C Screening Once for adults born between 1945 and 1965  More frequently in patients at high risk for Hepatitis C Hep C Antibody: 12/23/2021    Screening Current   Diabetes Screening 1-2 times per year if you're at risk for diabetes or have pre-diabetes Fasting glucose: 191 mg/dL (12/23/2021)  A1C: 6.0 % (1/14/2024)  Screening Not  Indicated  History Diabetes   Cholesterol Screening Once every 5 years if you don't have a lipid disorder. May order more often based on risk factors. Lipid panel: 01/14/2024  Screening Not Indicated  History Lipid Disorder      Other Preventive Screenings Covered by Medicare:  Abdominal Aortic Aneurysm (AAA) Screening: covered once if your at risk. You're considered to be at risk if you have a family history of AAA or a male between the age of 65-75 who smoking at least 100 cigarettes in your lifetime.  Lung Cancer Screening: covers low dose CT scan once per year if you meet all of the following conditions: (1) Age 55-77; (2) No signs or symptoms of lung cancer; (3) Current smoker or have quit smoking within the last 15 years; (4) You have a tobacco smoking history of at least 20 pack years (packs per day x number of years you smoked); (5) You get a written order from a healthcare provider.  Glaucoma Screening: covered annually if you're considered high risk: (1) You have diabetes OR (2) Family history of glaucoma OR (3)  aged 50 and older OR (4)  American aged 65 and older  Osteoporosis Screening: covered every 2 years if you meet one of the following conditions: (1) Have a vertebral abnormality; (2) On glucocorticoid therapy for more than 3 months; (3) Have primary hyperparathyroidism; (4) On osteoporosis medications and need to assess response to drug therapy.  HIV Screening: covered annually if you're between the age of 15-65. Also covered annually if you are younger than 15 and older than 65 with risk factors for HIV infection. For pregnant patients, it is covered up to 3 times per pregnancy.    Immunizations:  Immunization Recommendations   Influenza Vaccine Annual influenza vaccination during flu season is recommended for all persons aged >= 6 months who do not have contraindications   Pneumococcal Vaccine   * Pneumococcal conjugate vaccine = PCV13 (Prevnar 13), PCV15 (Vaxneuvance),  PCV20 (Prevnar 20)  * Pneumococcal polysaccharide vaccine = PPSV23 (Pneumovax) Adults 19-65 yo with certain risk factors or if 65+ yo  If never received any pneumonia vaccine: recommend Prevnar 20 (PCV20)  Give PCV20 if previously received 1 dose of PCV13 or PPSV23   Hepatitis B Vaccine 3 dose series if at intermediate or high risk (ex: diabetes, end stage renal disease, liver disease)   Respiratory syncytial virus (RSV) Vaccine - COVERED BY MEDICARE PART D  * RSVPreF3 (Arexvy) CDC recommends that adults 60 years of age and older may receive a single dose of RSV vaccine using shared clinical decision-making (SCDM)   Tetanus (Td) Vaccine - COST NOT COVERED BY MEDICARE PART B Following completion of primary series, a booster dose should be given every 10 years to maintain immunity against tetanus. Td may also be given as tetanus wound prophylaxis.   Tdap Vaccine - COST NOT COVERED BY MEDICARE PART B Recommended at least once for all adults. For pregnant patients, recommended with each pregnancy.   Shingles Vaccine (Shingrix) - COST NOT COVERED BY MEDICARE PART B  2 shot series recommended in those 19 years and older who have or will have weakened immune systems or those 50 years and older     Health Maintenance Due:      Topic Date Due   • HIV Screening  Never done   • Colorectal Cancer Screening  06/03/2024   • Hepatitis C Screening  Completed     Immunizations Due:      Topic Date Due   • Hepatitis A Vaccine (1 of 2 - Risk 2-dose series) Never done   • Pneumococcal Vaccine: Pediatrics (0 to 5 Years) and At-Risk Patients (6 to 64 Years) (2 of 2 - PCV) 03/08/2020     Advance Directives   What are advance directives?  Advance directives are legal documents that state your wishes and plans for medical care. These plans are made ahead of time in case you lose your ability to make decisions for yourself. Advance directives can apply to any medical decision, such as the treatments you want, and if you want to donate  organs.   What are the types of advance directives?  There are many types of advance directives, and each state has rules about how to use them. You may choose a combination of any of the following:  Living will:  This is a written record of the treatment you want. You can also choose which treatments you do not want, which to limit, and which to stop at a certain time. This includes surgery, medicine, IV fluid, and tube feedings.   Durable power of  for healthcare (DPAHC):  This is a written record that states who you want to make healthcare choices for you when you are unable to make them for yourself. This person, called a proxy, is usually a family member or a friend. You may choose more than 1 proxy.  Do not resuscitate (DNR) order:  A DNR order is used in case your heart stops beating or you stop breathing. It is a request not to have certain forms of treatment, such as CPR. A DNR order may be included in other types of advance directives.  Medical directive:  This covers the care that you want if you are in a coma, near death, or unable to make decisions for yourself. You can list the treatments you want for each condition. Treatment may include pain medicine, surgery, blood transfusions, dialysis, IV or tube feedings, and a ventilator (breathing machine).  Values history:  This document has questions about your views, beliefs, and how you feel and think about life. This information can help others choose the care that you would choose.  Why are advance directives important?  An advance directive helps you control your care. Although spoken wishes may be used, it is better to have your wishes written down. Spoken wishes can be misunderstood, or not followed. Treatments may be given even if you do not want them. An advance directive may make it easier for your family to make difficult choices about your care.   Weight Management   Why it is important to manage your weight:  Being overweight increases  your risk of health conditions such as heart disease, high blood pressure, type 2 diabetes, and certain types of cancer. It can also increase your risk for osteoarthritis, sleep apnea, and other respiratory problems. Aim for a slow, steady weight loss. Even a small amount of weight loss can lower your risk of health problems.  How to lose weight safely:  A safe and healthy way to lose weight is to eat fewer calories and get regular exercise. You can lose up about 1 pound a week by decreasing the number of calories you eat by 500 calories each day.   Healthy meal plan for weight management:  A healthy meal plan includes a variety of foods, contains fewer calories, and helps you stay healthy. A healthy meal plan includes the following:  Eat whole-grain foods more often.  A healthy meal plan should contain fiber. Fiber is the part of grains, fruits, and vegetables that is not broken down by your body. Whole-grain foods are healthy and provide extra fiber in your diet. Some examples of whole-grain foods are whole-wheat breads and pastas, oatmeal, brown rice, and bulgur.  Eat a variety of vegetables every day.  Include dark, leafy greens such as spinach, kale, hsorty greens, and mustard greens. Eat yellow and orange vegetables such as carrots, sweet potatoes, and winter squash.   Eat a variety of fruits every day.  Choose fresh or canned fruit (canned in its own juice or light syrup) instead of juice. Fruit juice has very little or no fiber.  Eat low-fat dairy foods.  Drink fat-free (skim) milk or 1% milk. Eat fat-free yogurt and low-fat cottage cheese. Try low-fat cheeses such as mozzarella and other reduced-fat cheeses.  Choose meat and other protein foods that are low in fat.  Choose beans or other legumes such as split peas or lentils. Choose fish, skinless poultry (chicken or turkey), or lean cuts of red meat (beef or pork). Before you cook meat or poultry, cut off any visible fat.   Use less fat and oil.  Try  baking foods instead of frying them. Add less fat, such as margarine, sour cream, regular salad dressing and mayonnaise to foods. Eat fewer high-fat foods. Some examples of high-fat foods include french fries, doughnuts, ice cream, and cakes.  Eat fewer sweets.  Limit foods and drinks that are high in sugar. This includes candy, cookies, regular soda, and sweetened drinks.  Exercise:  Exercise at least 30 minutes per day on most days of the week. Some examples of exercise include walking, biking, dancing, and swimming. You can also fit in more physical activity by taking the stairs instead of the elevator or parking farther away from stores. Ask your healthcare provider about the best exercise plan for you.      © Copyright [employer] 2018 Information is for End User's use only and may not be sold, redistributed or otherwise used for commercial purposes. All illustrations and images included in CareNotes® are the copyrighted property of [employer]. or [employer]    Medicare Preventive Visit Patient Instructions  Thank you for completing your Welcome to Medicare Visit or Medicare Annual Wellness Visit today. Your next wellness visit will be due in one year (5/18/2025).  The screening/preventive services that you may require over the next 5-10 years are detailed below. Some tests may not apply to you based off risk factors and/or age. Screening tests ordered at today's visit but not completed yet may show as past due. Also, please note that scanned in results may not display below.  Preventive Screenings:  Service Recommendations Previous Testing/Comments   Colorectal Cancer Screening  Colonoscopy    Fecal Occult Blood Test (FOBT)/Fecal Immunochemical Test (FIT)  Fecal DNA/Cologuard Test  Flexible Sigmoidoscopy Age: 45-75 years old   Colonoscopy: every 10 years (May be performed more frequently if at higher risk)  OR  FOBT/FIT: every 1 year  OR  Cologuard: every 3 years  OR  Sigmoidoscopy: every 5  years  Screening may be recommended earlier than age 45 if at higher risk for colorectal cancer. Also, an individualized decision between you and your healthcare provider will decide whether screening between the ages of 76-85 would be appropriate. Colonoscopy: 06/03/2021  FOBT/FIT: Not on file  Cologuard: Not on file  Sigmoidoscopy: Not on file    Screening Current     Prostate Cancer Screening Individualized decision between patient and health care provider in men between ages of 55-69   Medicare will cover every 12 months beginning on the day after your 50th birthday PSA: No results in last 5 years     Screening Not Indicated     Hepatitis C Screening Once for adults born between 1945 and 1965  More frequently in patients at high risk for Hepatitis C Hep C Antibody: 12/23/2021    Screening Current   Diabetes Screening 1-2 times per year if you're at risk for diabetes or have pre-diabetes Fasting glucose: 191 mg/dL (12/23/2021)  A1C: 6.0 % (1/14/2024)  Screening Not Indicated  History Diabetes   Cholesterol Screening Once every 5 years if you don't have a lipid disorder. May order more often based on risk factors. Lipid panel: 01/14/2024  Screening Not Indicated  History Lipid Disorder      Other Preventive Screenings Covered by Medicare:  Abdominal Aortic Aneurysm (AAA) Screening: covered once if your at risk. You're considered to be at risk if you have a family history of AAA or a male between the age of 65-75 who smoking at least 100 cigarettes in your lifetime.  Lung Cancer Screening: covers low dose CT scan once per year if you meet all of the following conditions: (1) Age 55-77; (2) No signs or symptoms of lung cancer; (3) Current smoker or have quit smoking within the last 15 years; (4) You have a tobacco smoking history of at least 20 pack years (packs per day x number of years you smoked); (5) You get a written order from a healthcare provider.  Glaucoma Screening: covered annually if you're considered  high risk: (1) You have diabetes OR (2) Family history of glaucoma OR (3)  aged 50 and older OR (4)  American aged 65 and older  Osteoporosis Screening: covered every 2 years if you meet one of the following conditions: (1) Have a vertebral abnormality; (2) On glucocorticoid therapy for more than 3 months; (3) Have primary hyperparathyroidism; (4) On osteoporosis medications and need to assess response to drug therapy.  HIV Screening: covered annually if you're between the age of 15-65. Also covered annually if you are younger than 15 and older than 65 with risk factors for HIV infection. For pregnant patients, it is covered up to 3 times per pregnancy.    Immunizations:  Immunization Recommendations   Influenza Vaccine Annual influenza vaccination during flu season is recommended for all persons aged >= 6 months who do not have contraindications   Pneumococcal Vaccine   * Pneumococcal conjugate vaccine = PCV13 (Prevnar 13), PCV15 (Vaxneuvance), PCV20 (Prevnar 20)  * Pneumococcal polysaccharide vaccine = PPSV23 (Pneumovax) Adults 19-63 yo with certain risk factors or if 65+ yo  If never received any pneumonia vaccine: recommend Prevnar 20 (PCV20)  Give PCV20 if previously received 1 dose of PCV13 or PPSV23   Hepatitis B Vaccine 3 dose series if at intermediate or high risk (ex: diabetes, end stage renal disease, liver disease)   Respiratory syncytial virus (RSV) Vaccine - COVERED BY MEDICARE PART D  * RSVPreF3 (Arexvy) CDC recommends that adults 60 years of age and older may receive a single dose of RSV vaccine using shared clinical decision-making (SCDM)   Tetanus (Td) Vaccine - COST NOT COVERED BY MEDICARE PART B Following completion of primary series, a booster dose should be given every 10 years to maintain immunity against tetanus. Td may also be given as tetanus wound prophylaxis.   Tdap Vaccine - COST NOT COVERED BY MEDICARE PART B Recommended at least once for all adults. For pregnant  patients, recommended with each pregnancy.   Shingles Vaccine (Shingrix) - COST NOT COVERED BY MEDICARE PART B  2 shot series recommended in those 19 years and older who have or will have weakened immune systems or those 50 years and older     Health Maintenance Due:      Topic Date Due   • HIV Screening  Never done   • Colorectal Cancer Screening  06/03/2024   • Hepatitis C Screening  Completed     Immunizations Due:      Topic Date Due   • Hepatitis A Vaccine (1 of 2 - Risk 2-dose series) Never done   • Pneumococcal Vaccine: Pediatrics (0 to 5 Years) and At-Risk Patients (6 to 64 Years) (2 of 2 - PCV) 03/08/2020     Advance Directives   What are advance directives?  Advance directives are legal documents that state your wishes and plans for medical care. These plans are made ahead of time in case you lose your ability to make decisions for yourself. Advance directives can apply to any medical decision, such as the treatments you want, and if you want to donate organs.   What are the types of advance directives?  There are many types of advance directives, and each state has rules about how to use them. You may choose a combination of any of the following:  Living will:  This is a written record of the treatment you want. You can also choose which treatments you do not want, which to limit, and which to stop at a certain time. This includes surgery, medicine, IV fluid, and tube feedings.   Durable power of  for healthcare (DPAHC):  This is a written record that states who you want to make healthcare choices for you when you are unable to make them for yourself. This person, called a proxy, is usually a family member or a friend. You may choose more than 1 proxy.  Do not resuscitate (DNR) order:  A DNR order is used in case your heart stops beating or you stop breathing. It is a request not to have certain forms of treatment, such as CPR. A DNR order may be included in other types of advance  directives.  Medical directive:  This covers the care that you want if you are in a coma, near death, or unable to make decisions for yourself. You can list the treatments you want for each condition. Treatment may include pain medicine, surgery, blood transfusions, dialysis, IV or tube feedings, and a ventilator (breathing machine).  Values history:  This document has questions about your views, beliefs, and how you feel and think about life. This information can help others choose the care that you would choose.  Why are advance directives important?  An advance directive helps you control your care. Although spoken wishes may be used, it is better to have your wishes written down. Spoken wishes can be misunderstood, or not followed. Treatments may be given even if you do not want them. An advance directive may make it easier for your family to make difficult choices about your care.   Weight Management   Why it is important to manage your weight:  Being overweight increases your risk of health conditions such as heart disease, high blood pressure, type 2 diabetes, and certain types of cancer. It can also increase your risk for osteoarthritis, sleep apnea, and other respiratory problems. Aim for a slow, steady weight loss. Even a small amount of weight loss can lower your risk of health problems.  How to lose weight safely:  A safe and healthy way to lose weight is to eat fewer calories and get regular exercise. You can lose up about 1 pound a week by decreasing the number of calories you eat by 500 calories each day.   Healthy meal plan for weight management:  A healthy meal plan includes a variety of foods, contains fewer calories, and helps you stay healthy. A healthy meal plan includes the following:  Eat whole-grain foods more often.  A healthy meal plan should contain fiber. Fiber is the part of grains, fruits, and vegetables that is not broken down by your body. Whole-grain foods are healthy and provide  extra fiber in your diet. Some examples of whole-grain foods are whole-wheat breads and pastas, oatmeal, brown rice, and bulgur.  Eat a variety of vegetables every day.  Include dark, leafy greens such as spinach, kale, shorty greens, and mustard greens. Eat yellow and orange vegetables such as carrots, sweet potatoes, and winter squash.   Eat a variety of fruits every day.  Choose fresh or canned fruit (canned in its own juice or light syrup) instead of juice. Fruit juice has very little or no fiber.  Eat low-fat dairy foods.  Drink fat-free (skim) milk or 1% milk. Eat fat-free yogurt and low-fat cottage cheese. Try low-fat cheeses such as mozzarella and other reduced-fat cheeses.  Choose meat and other protein foods that are low in fat.  Choose beans or other legumes such as split peas or lentils. Choose fish, skinless poultry (chicken or turkey), or lean cuts of red meat (beef or pork). Before you cook meat or poultry, cut off any visible fat.   Use less fat and oil.  Try baking foods instead of frying them. Add less fat, such as margarine, sour cream, regular salad dressing and mayonnaise to foods. Eat fewer high-fat foods. Some examples of high-fat foods include french fries, doughnuts, ice cream, and cakes.  Eat fewer sweets.  Limit foods and drinks that are high in sugar. This includes candy, cookies, regular soda, and sweetened drinks.  Exercise:  Exercise at least 30 minutes per day on most days of the week. Some examples of exercise include walking, biking, dancing, and swimming. You can also fit in more physical activity by taking the stairs instead of the elevator or parking farther away from stores. Ask your healthcare provider about the best exercise plan for you.      © Copyright [employer] 2018 Information is for End User's use only and may not be sold, redistributed or otherwise used for commercial purposes. All illustrations and images included in CareNotes® are the copyrighted property of  [employer]. or [employer]

## 2024-05-17 NOTE — PROGRESS NOTES
Ambulatory Visit  Name: Mateus Mckeon      : 1985      MRN: [id number]  Encounter Provider: Dania Sher DO  Encounter Date: 2024   Encounter department: [facility] INTERNAL MEDICINE    Assessment & Plan   1. Gastroparesis diabeticorum  (HCC)  Assessment & Plan:  -DM1 with gastroparesis  Lab Results   Component Value Date    HGBA1C 6.0 (H) 2024   -was prescribed reglan prn, advised can try to switch form zofran to reglan for n/v  2. Renovascular hypertension  Assessment & Plan:  -home bp controlled on current regimen.  Of note, patient has stopped using clonidine  -he remains on hydralazine 100mg TID, labetaolol 400mg TID, lisinopri l40mg daily, minoxidil 5mg bid  -follows with Nephro  3. Moderate episode of recurrent major depressive disorder (HCC)  Assessment & Plan:  -moderate in severity  -continue escitalopram 20mg daily  4. RACHEAL (obstructive sleep apnea)  Assessment & Plan:  -severe on PSG  -intolerant of apap  5. Pruritus  Assessment & Plan:  -with underlying esrd  -multiple excoriations on BL forearms and lower legs  -moisturize and use atarax prn  6. Hypothyroidism, unspecified type  -     TSH, 3rd generation with Free T4 reflex; Future; Expected date: 2024  7. Medicare annual wellness visit, subsequent      Depression Screening and Follow-up Plan: Patient's depression screening was positive with a PHQ-9 score of 14. Patient with underlying depression and was advised to continue current medications as prescribed.       Preventive health issues were discussed with patient, and age appropriate screening tests were ordered as noted in patient's After Visit Summary. Personalized health advice and appropriate referrals for health education or preventive services given if needed, as noted in patient's After Visit Summary.    History of Present Illness     HPI     Home BP <120/-.  Forgot to place clonidine patch several weeks ago and has not restarted.  Feels  sluggish.  Dry weight is 88-89kg.  Was experiencing numbness of BL arms due to staying in same position while getting HD and has had to request to cut it short.  Also, when short staffed, staff has stopped HD prematurely.    Started flonase and ear pain resolved.    PHQ-2/9 Depression Screening    Little interest or pleasure in doing things: 1 - several days  Feeling down, depressed, or hopeless: 1 - several days  Trouble falling or staying asleep, or sleeping too much: 3 - nearly every day  Feeling tired or having little energy: 3 - nearly every day  Poor appetite or overeatin - not at all  Feeling bad about yourself - or that you are a failure or have let yourself or your family down: 3 - nearly every day  Trouble concentrating on things, such as reading the newspaper or watching television: 3 - nearly every day  Moving or speaking so slowly that other people could have noticed. Or the opposite - being so fidgety or restless that you have been moving around a lot more than usual: 0 - not at all  Thoughts that you would be better off dead, or of hurting yourself in some way: 0 - not at all  PHQ-9 Score: 14  PHQ-9 Interpretation: Moderate depression         Patient Care Team:  Dania Sher DO as PCP - General  Dania Sher DO as PCP - PCP-[facility] ([facility])  Bryan Justice MD (Neurology)  Magan Bridges MD (Ophthalmology)  Ale Figueroa MD (Gastroenterology)  Douglas Myrick MD (Internal Medicine)  Ceasar Sparrow MD (Ophthalmology)  Sudarshan Zaragoza MD (Pulmonary Disease)  Parag Belle MD (Cardiology)  Michael Bromberg, MD (Internal Medicine)  Anibal Burt MD (Nephrology)  Miguel Ford DPM (Podiatry)  Maya Riley MD (Nephrology)    Review of Systems   Constitutional:  Positive for fatigue. Negative for appetite change.   HENT:  Negative for congestion, postnasal drip and rhinorrhea.    Eyes:  Positive for visual disturbance.   Respiratory:  Positive for apnea.  Negative for cough, shortness of breath and wheezing.    Cardiovascular:  Negative for chest pain and palpitations.   Gastrointestinal:  Positive for vomiting (occasional). Negative for abdominal pain, constipation, diarrhea and nausea.   Genitourinary:         Oligouric   Musculoskeletal:  Negative for arthralgias.   Skin:  Positive for color change and wound.   Neurological:  Positive for dizziness and numbness.   Psychiatric/Behavioral:  Positive for dysphoric mood.      Medical History Reviewed by provider this encounter:  Meds         Current Outpatient Medications:   •  aspirin (ECOTRIN LOW STRENGTH) 81 mg EC tablet, Take 81 mg by mouth daily, Disp: , Rfl:   •  atorvastatin (LIPITOR) 40 mg tablet, Take 1 tablet (40 mg total) by mouth every evening, Disp: 90 tablet, Rfl: 3  •  b complex vitamins capsule, Take 1 capsule by mouth daily before lunch , Disp: , Rfl:   •  calcium acetate (PHOSLO) capsule, Take 1 capsule (667 mg total) by mouth 3 (three) times a day, Disp: 90 capsule, Rfl: 0  •  cinacalcet (SENSIPAR) 60 MG tablet, Take 1 tablet (60 mg total) by mouth daily, Disp: 30 tablet, Rfl: 0  •  escitalopram (LEXAPRO) 20 mg tablet, Take 1 tablet (20 mg total) by mouth daily, Disp: 90 tablet, Rfl: 2  •  famotidine (PEPCID) 40 MG tablet, TAKE 1 TABLET BY MOUTH IN THE MORNING, Disp: 90 tablet, Rfl: 0  •  gabapentin (NEURONTIN) 100 mg capsule, TAKE1 CAPSULES BY MOUTH AT BEDTIME, Disp: 90 capsule, Rfl: 3  •  GLUCAGON EMERGENCY 1 MG injection, , Disp: , Rfl: 3  •  Gvoke HypoPen 1-Pack 1 MG/0.2ML SOAJ, , Disp: , Rfl:   •  hydrALAZINE (APRESOLINE) 100 MG tablet, Take 1 tablet (100 mg total) by mouth 3 (three) times a day, Disp: 90 tablet, Rfl: 0  •  hydrOXYzine HCL (ATARAX) 10 mg tablet, Take 1 tablet (10 mg total) by mouth every 6 (six) hours as needed for itching or anxiety, Disp: 30 tablet, Rfl: 3  •  Insulin Disposable Pump (Omnipod 5 G6 Intro, Gen 5,) KIT, , Disp: , Rfl:   •  Insulin Disposable Pump (Omnipod 5 G6  Pod, Gen 5,) MISC, , Disp: , Rfl:   •  labetalol (NORMODYNE) 200 mg tablet, Take 2 tablets (400 mg total) by mouth 3 (three) times a day, Disp: 180 tablet, Rfl: 0  •  lisinopril (ZESTRIL) 40 mg tablet, Take 1 tablet (40 mg total) by mouth daily, Disp: 30 tablet, Rfl: 0  •  metoclopramide (REGLAN) 5 mg tablet, Take 1 tablet by mouth three times daily as needed, Disp: 90 tablet, Rfl: 0  •  minoxidil (LONITEN) 2.5 mg tablet, Take 2 tablets (5 mg total) by mouth 2 (two) times a day (Patient taking differently: Take 5 mg by mouth daily), Disp: 120 tablet, Rfl: 0  •  NIFEdipine (PROCARDIA XL) 30 mg 24 hr tablet, Take 2 tablets (60 mg total) by mouth 2 (two) times a day, Disp: 120 tablet, Rfl: 0  •  NovoLOG 100 UNIT/ML injection, , Disp: , Rfl:   •  Patiromer Sorbitex Calcium (VELTASSA PO), Take 5 g by mouth once a week, Disp: , Rfl:   •  saccharomyces boulardii (FLORASTOR) 250 mg capsule, Take 250 mg by mouth daily, Disp: , Rfl:   •  SPS 15 GM/60ML suspension, TAKE 60 ML BY MOUTH SINGLE DOSE FOR EMERGENCY USE DURING MISSED HEMODIALYSIS, Disp: , Rfl:   •  traZODone (DESYREL) 50 mg tablet, TAKE 1/2 (ONE-HALF) TABLET BY MOUTH ONCE DAILY AT BEDTIME AS NEEDED FOR SLEEP, Disp: 30 tablet, Rfl: 2  •  cloNIDine (CATAPRES-TTS-3) 0.3 mg/24 hr, 1 patch once a week (Patient not taking: Reported on 5/17/2024), Disp: , Rfl:   •  ondansetron (ZOFRAN) 4 mg tablet, Take 1 tablet (4 mg total) by mouth every 8 (eight) hours as needed for nausea or vomiting (Patient not taking: Reported on 5/17/2024), Disp: 90 tablet, Rfl: 0    Annual Wellness Visit Questionnaire   Mateus is here for his Subsequent Wellness visit. Last Medicare Wellness visit information reviewed, patient interviewed and updates made to the record today.      Health Risk Assessment:   Patient rates overall health as poor. Patient feels that their physical health rating is slightly worse. Patient is dissatisfied with their life. Eyesight was rated as slightly worse. Hearing  was rated as same. Patient feels that their emotional and mental health rating is same. Patients states they are never, rarely angry. Patient states they are often unusually tired/fatigued. Pain experienced in the last 7 days has been some. Patient's pain rating has been 4/10. Patient states that he has experienced no weight loss or gain in last 6 months.     Depression Screening:   PHQ-9 Score: 14      Fall Risk Screening:   In the past year, patient has experienced: history of falling in past year    Number of falls: 1  Injured during fall?: No    Feels unsteady when standing or walking?: Yes    Worried about falling?: Yes      Home Safety:  Patient does not have trouble with stairs inside or outside of their home. Patient has working smoke alarms and has working carbon monoxide detector. Home safety hazards include: none.     Nutrition:   Current diet is Diabetic.     Medications:   Patient is not currently taking any over-the-counter supplements. Patient is able to manage medications.     Activities of Daily Living (ADLs)/Instrumental Activities of Daily Living (IADLs):   Walk and transfer into and out of bed and chair?: Yes  Dress and groom yourself?: Yes    Bathe or shower yourself?: Yes    Feed yourself? Yes  Do your laundry/housekeeping?: Yes  Manage your money, pay your bills and track your expenses?: Yes  Make your own meals?: Yes    Do your own shopping?: Yes    Durable Medical Equipment Suppliers  pt does not know    Previous Hospitalizations:   Any hospitalizations or ED visits within the last 12 months?: Yes    How many hospitalizations have you had in the last year?: 3-4    Advance Care Planning:   Living will: No    Durable POA for healthcare: No    Advanced directive: No    ACP document given: Yes      Cognitive Screening:   Provider or family/friend/caregiver concerned regarding cognition?: No    PREVENTIVE SCREENINGS      Cardiovascular Screening:    General: Screening Not Indicated and History  "Lipid Disorder      Diabetes Screening:     General: Screening Not Indicated and History Diabetes      Colorectal Cancer Screening:     General: Screening Current      Prostate Cancer Screening:    General: Screening Not Indicated      Osteoporosis Screening:    General: Screening Not Indicated      Abdominal Aortic Aneurysm (AAA) Screening:    Risk factors include: tobacco use        General: Screening Not Indicated      Lung Cancer Screening:     General: Screening Not Indicated      Hepatitis C Screening:    General: Screening Current    Screening, Brief Intervention, and Referral to Treatment (SBIRT)    Screening  Typical number of drinks in a day: 0  Typical number of drinks in a week: 0  Interpretation: Low risk drinking behavior.    Single Item Drug Screening:  How often have you used an illegal drug (including marijuana) or a prescription medication for non-medical reasons in the past year? daily or almost daily    Single Item Drug Screen Score: 4  Interpretation: POSITIVE screen for possible drug use disorder    Drug Abuse Screening Test (DAST-10):  1) Have you used drugs other than those required for medical reasons? Yes  2) Do you abuse more than one drug at a time? No  3) Are you always able to stop using drugs when you want to? Yes  4) Have you had \"blackouts\" or \"flashbacks\" as a result of drug use? No  5) Do you ever feel bad or guilty about your drug use? No  6) Does your spouse (or parents) ever complain about your involvement with drugs? No  7) Have you neglected your family because of your use of drugs? No  8) Have you engaged in illegal activities in order to obtain drugs? No  9) Have you ever experienced withdrawal symptoms (felt sick) when you stopped taking drugs? No  10) Have you had medical problems as a result of your drug use (e.g., memory loss, hepatitis, convulsions, bleeding, etc.)? No    DAST-10 Score: 1  Interpretation: Low level problems related to drug abuse    Brief " "Intervention  Alcohol & drug use screenings were reviewed. No concerns regarding substance use disorder identified.     Time Spent  Time spent screening/evaluating the patient for alcohol misuse: 1 minutes.     Other Counseling Topics:   Car/seat belt/driving safety, skin self-exam, sunscreen and regular weightbearing exercise and calcium and vitamin D intake. Immunizations discussed.  Consider prevnar 20    Social Determinants of Health     Financial Resource Strain: Low Risk  (1/18/2023)    Overall Financial Resource Strain (CARDIA)    • Difficulty of Paying Living Expenses: Not hard at all   Food Insecurity: No Food Insecurity (5/17/2024)    Hunger Vital Sign    • Worried About Running Out of Food in the Last Year: Never true    • Ran Out of Food in the Last Year: Never true   Transportation Needs: No Transportation Needs (5/17/2024)    PRAPARE - Transportation    • Lack of Transportation (Medical): No    • Lack of Transportation (Non-Medical): No   Housing Stability: Low Risk  (5/17/2024)    Housing Stability Vital Sign    • Unable to Pay for Housing in the Last Year: No    • Number of Times Moved in the Last Year: 0    • Homeless in the Last Year: No   Utilities: Not At Risk (5/17/2024)    [facility] Utilities    • Threatened with loss of utilities: No     No results found.    Objective     /78 (BP Location: Right arm, Patient Position: Sitting)   Pulse 76   Resp 16   Ht 5' 5\" (1.651 m)   Wt 90.7 kg (200 lb)   SpO2 97%   BMI 33.28 kg/m²     Physical Exam  Vitals reviewed.   Constitutional:       General: He is not in acute distress.  HENT:      Head: Normocephalic.      Right Ear: Tympanic membrane and ear canal normal.      Left Ear: Tympanic membrane and ear canal normal.      Nose: Nose normal.      Mouth/Throat:      Mouth: Mucous membranes are moist.   Eyes:      Extraocular Movements: Extraocular movements intact.      Conjunctiva/sclera: Conjunctivae normal.      Pupils: Pupils are equal, round, and " reactive to light.   Cardiovascular:      Rate and Rhythm: Normal rate and regular rhythm.      Pulses: Normal pulses.      Heart sounds: Normal heart sounds.   Pulmonary:      Effort: Pulmonary effort is normal. No respiratory distress.      Breath sounds: Normal breath sounds. No wheezing.   Abdominal:      General: Bowel sounds are normal. There is no distension.      Palpations: Abdomen is soft.      Tenderness: There is no abdominal tenderness.   Musculoskeletal:         General: Swelling (R hand) present.      Cervical back: Neck supple. No tenderness.      Right lower leg: No edema.      Left lower leg: No edema.      Comments: L AVF intact   Lymphadenopathy:      Cervical: No cervical adenopathy.   Skin:     Coloration: Skin is not pale.      Comments: Multiple excoriations on BL forearms   Neurological:      Mental Status: He is alert and oriented to person, place, and time.   Psychiatric:         Mood and Affect: Mood normal.       Administrative Statements

## 2024-05-19 NOTE — ASSESSMENT & PLAN NOTE
-home bp controlled on current regimen.  Of note, patient has stopped using clonidine  -he remains on hydralazine 100mg TID, labetaolol 400mg TID, lisinopri l40mg daily, minoxidil 5mg bid  -follows with Nephro

## 2024-05-19 NOTE — ASSESSMENT & PLAN NOTE
-with underlying esrd  -multiple excoriations on BL forearms and lower legs  -moisturize and use atarax prn

## 2024-05-20 ENCOUNTER — OFFICE VISIT (OUTPATIENT)
Facility: CLINIC | Age: 39
End: 2024-05-20
Payer: MEDICARE

## 2024-05-20 DIAGNOSIS — I63.89 CEREBROVASCULAR ACCIDENT (CVA) DUE TO OTHER MECHANISM (HCC): Primary | ICD-10-CM

## 2024-05-20 DIAGNOSIS — I63.9 ACUTE CVA (CEREBROVASCULAR ACCIDENT) (HCC): Primary | ICD-10-CM

## 2024-05-20 PROCEDURE — 97112 NEUROMUSCULAR REEDUCATION: CPT

## 2024-05-20 PROCEDURE — 97110 THERAPEUTIC EXERCISES: CPT

## 2024-05-20 PROCEDURE — 97112 NEUROMUSCULAR REEDUCATION: CPT | Performed by: PHYSICAL THERAPIST

## 2024-05-20 PROCEDURE — 97530 THERAPEUTIC ACTIVITIES: CPT

## 2024-05-20 NOTE — PROGRESS NOTES
Daily Note     Today's date: 2024  Patient name: Mateus Mckeon  : 1985  MRN: [id number]  Referring provider: Dania Sher DO  Dx:   Encounter Diagnosis     ICD-10-CM    1. Cerebrovascular accident (CVA) due to other mechanism (HCC)  I63.89         Start Time: 0803  Stop Time: 0842  Total time in clinic (min): 39 minutes      PLAN OF CARE START: 24  PLAN OF CARE END: 2024  FREQUENCY: 2 times a week  PRECAUTIONS: Renal failure, actively going through dialysis; type 1 diabetes; HTN; SAH; seizure (last one was 2019); history of 2 previous strokes      Subjective: Pt states he wants to work more on his hand today    Objective: See treatment diary below  ON FLUID RESTRICTIONS--DO NOT OFFER WATER    NMR:  -NuStep level 1 for 7 minutes with to address endurance, proprioceptive input, R UE strengthening. Required no rest breaks today  -Pushing and pulling red power web with the R UE working on proximal UE strength,  strength, and R hand flexibility.   -Performed putty exercises with tan theraputty including squeezing, rolling, and pinching. Pt was educated on use for home to add to his HEP.     TE:  Manual therapy at the R forearm musculature for tone management follow by PROM and joint mobilization at the wrist/digits to improve ROM required for grasp. Mild pain reported at the PIPs/DIPs with PROM, which resolved following PROM.     TA:   Provided education regarding position for the R hand during day/night, when to use resting hand splint, and gentle stretching exercises to prevent tightness he is experiencing bilaterally. Pt verbalized understanding of all instructions in regard to discussion today.     Assessment: Pt tolerated activities fairly. Continues to fatigue quickly in session, however tolerated the NuStep better today.  Pt would benefit from continued skilled occupational therapy services to improve fine motor coordination, dexterity, stregnth, UE function, and functional  cognition.    Plan: Continue per plan of care.     SHORT TERM GOALS ADDED 2/20/24:  Pt will increase sustained attention by completing trail making part A in 35 seconds to complete IADLs NOT MET  Pt will increase divided attention by completing trail making part B in 1 minute 30 seconds to complete IADLs NOT MET  Pt will increase delayed recall and recall 4 /5 items on MOCA to complete IADLs GOAL MET    LONG TERM GOALS ADDED 2/20/24:  Pt will increase sustained attention by completing trail making part A in 38 seconds to complete IADLs  Pt will increase divided attention by completing trail making part B in 1 minute 10 seconds to complete IADLs  Pt will increase delayed recall and recall 5/5 items on MOCA to complete IADLs  Resume right  hand dominance to refined functional assist with all activities of daily living

## 2024-05-20 NOTE — PROGRESS NOTES
"Daily Note     Today's date: 2024  Patient name: Mateus Mckeon  : 1985  MRN: [id number]  Referring provider: Dania Sher DO  Dx:   Encounter Diagnosis     ICD-10-CM    1. Acute CVA (cerebrovascular accident) (HCC)  I63.9                   POC expires Auth Status Total   Visits  Start date  Expiration date PT/OT + Visit Limit? Co-Insurance   24     bomn                                                         Subjective: Patient reports he is feeling pretty good this morning, arrives from OT      Objective: See treatment diary below  *FLUID RESTRICTION- do not offer water*       NMR (3# ankle weights - SOLO STEP):  - Fwd/bwd walking 10x (no ankle weights)  - Agility ladder fwd in/out x 2 laps   - Agility ladder lateral in/out x 2 laps   - Agility ladder FWD/BWD x2 laps  - Step ups (4\") w/ foam x2 minutes        Assessment: Patient tolerated treatment session well today with continued focus on balance, strength, and mobility. Agility ladder used to challenge patient dynamic balance and LE coordination, requiring cuing to maintain speed to increase step length when moving backwards. PT Iveth provided during step ups due to posterior LOB when on unstable foam surface. He continues to require frequent rest breaks t/o session due to decreased endurance. Patient would benefit from continued PT to improve balance, endurance, and reduce fall risk.       Plan: Continue per plan of care.  Focus is balance & gait.          Outcome Measures Initial Eval  24 Re-eval  24 PN  2024 RE  2024     5xSTS 31.25 sec 52.20 sec no UE's    (1 LOB) 19.91 sec no UE 20.14 sec  No UE      TUG 16.3 sec with rollator    23.6 sec no AD 20.68 sec with rollator    18.44 sec no AD 23.41 sec with rollator    12.92 sec no AD 19.69 sec with rollator    12.45 sec no AD      10 meter  Next visit> 0.88 m/s 1.08 m/s     MEDRANO  Next visit> 44/56 48/56     FGA  Next visit>      6MWT 900 ft 710 " ft  Rollator  760 ft no  ft  No AD      mCTSIB  FTEO firm  FTEC firm  FTEO foam  FTEC foam   Next visit> 30 sec  30 sec  5.5 sec  defer 30 sec  30 sec  30 sec  1 sec     ABC  65% 43.75% 56.88%

## 2024-05-23 ENCOUNTER — EVALUATION (OUTPATIENT)
Facility: CLINIC | Age: 39
End: 2024-05-23
Payer: MEDICARE

## 2024-05-23 ENCOUNTER — OFFICE VISIT (OUTPATIENT)
Facility: CLINIC | Age: 39
End: 2024-05-23
Payer: MEDICARE

## 2024-05-23 ENCOUNTER — OFFICE VISIT (OUTPATIENT)
Dept: PODIATRY | Facility: CLINIC | Age: 39
End: 2024-05-23
Payer: MEDICARE

## 2024-05-23 VITALS — WEIGHT: 197 LBS | BODY MASS INDEX: 32.82 KG/M2 | HEIGHT: 65 IN | RESPIRATION RATE: 18 BRPM

## 2024-05-23 DIAGNOSIS — I63.89 CEREBROVASCULAR ACCIDENT (CVA) DUE TO OTHER MECHANISM (HCC): Primary | ICD-10-CM

## 2024-05-23 DIAGNOSIS — E10.42 DIABETIC POLYNEUROPATHY ASSOCIATED WITH TYPE 1 DIABETES MELLITUS (HCC): Primary | ICD-10-CM

## 2024-05-23 DIAGNOSIS — I63.9 ACUTE CVA (CEREBROVASCULAR ACCIDENT) (HCC): Primary | ICD-10-CM

## 2024-05-23 DIAGNOSIS — B35.1 ONYCHOMYCOSIS: ICD-10-CM

## 2024-05-23 PROCEDURE — 97530 THERAPEUTIC ACTIVITIES: CPT

## 2024-05-23 PROCEDURE — 11720 DEBRIDE NAIL 1-5: CPT | Performed by: PODIATRIST

## 2024-05-23 PROCEDURE — 97112 NEUROMUSCULAR REEDUCATION: CPT | Performed by: PHYSICAL THERAPIST

## 2024-05-23 NOTE — PROGRESS NOTES
"OCCUPATIONAL THERAPY RE-EVALUATION    Today's Date: 2024  Patient Name: Mateus Mckeon  : 1985  MRN: [id number]  Referring Provider: Dania Sher DO  Dx: Cerebrovascular accident (CVA) due to other mechanism (HCC) [I63.89]        SKILLED ANALYSIS:  Pt presents to re-evaluation on 24 status post CVA post angioplasty. As per interview, pt reports his endurance is not where it needs to be and he is losing ground with his right hand, however he has continued with his HEP. Pt reports impairments in R hand use, endurance, and still difficult performing ADLs/IADLs. Pt completed following assessments: FMA-UE, MMT, dynamometer, pinch gauge, 9-hole peg test, and FDT today. Post assessments, pt demonstrating improvements in  strength, FMC, dexterity, and wrist/hand use. Pt continues to demonstrate impairments in use of his R hand, primarily with motor control, coordination, and tightness in the R hand which are impacting functionality of his R dominant hand. Pt achieved another 5 goals since his last re-evaluation, showing excellent progress, despite reporting limited progress. Pt would benefit from continued OT services focusing on impairments for 8-12 more weeks. Pt educated on progress/therapy process and pt in understanding and agreement of recommendations. Recommending to continue HEP       Pt has been on the kidney transplant list for 6 years.       Subjective:   : Pt states he feels like his endurance is not where it needs to be. He has been attempting to use his hand as much as he can, however does not see much progress - if any thing he states \"I feel like I'm losing ground\". Pt states he did start using the theraputty and continued using foam to work on hand strength. Pt states he is not able to keep up with her exercises on days that he has dialysis due to fatigue.       PLEASE COMPLETE TM A AND B, CMT, AND MAS NEXT SESSION       PLAN OF CARE START: 24  PLAN OF CARE END: " "8/23/2024  FREQUENCY: 2 times a week  PRECAUTIONS: Renal failure, actively going through dialysis; type 1 diabetes; HTN; SAH; seizure (last one was 2019)    Subjective    Mechanism of Injury  Paulo is a 38-year-old male with history of end-stage renal disease on dialysis, hypertension, type 1 diabetes, and recent subarachnoid hemorrhage   Pt reports he went to the hospital and they found a brain bleed and reported he needed an angioplasty. Unfortunately, during the angioplasty he suffered a stroke that affected his R UE. Since his stroke he reports his R UE function has improved although he continues with R hand FMC/dexterity deficits.   Overall, pt requires assistance with 75% of his daily activities due to complex medical presentation as well as increased fatigue post-stroke.   \"There's nothing more like purgatory than using your non dominant side.\"    From Hospital note on 1/14/24:   38-year-old man with prior history of cerebellar and pontine strokes, prothrombin gene mutation, MTHFR gene mutation, diabetes, diabetic nephropathy with ESRD on HD, who presented with headache on 1/5 and was found to have subarachnoid hemorrhage in the basilar cisterns.  Unclear etiology.  Patient family reports nausea and retching around the time of headache.     - Underwent conventional angiogram later that day which was unremarkable for any clear source.  - MRI brain on 1/8 demonstrated multiple punctate foci of ischemia in the right MCA, bilateral cerebellar hemispheres, and right velia which may represent sequela of angiograph versus embolic strokes of unclear source (ESUS)   -Repeat angiogram on 1/12 was again unremarkable for source of subarachnoid.     Neurology consulted for right upper extremity weakness noted after second angiogram.  - Repeat MRI demonstrated numerous new embolic appearing foci of ischemia with similar suspected etiology as those mentioned above.     Transcranial Dopplers have been unremarkable with " "Lindegaard ratios consistently less than 3.     Spontaneous basilar cistern subarachnoid hemorrhage of yet unclear etiology.      Embolic appearing strokes, suspect complication of angiogram however cannot exclude additional embolic source.        Occupational Profile  Pt lives in a home with his parents; has one flight to bedroom and has AD throughout the house (grab bars etc.). Paulo reports he needs help with preparing meals (could get by making 1 meal for himself but not all day), laundry, and driving. He is independent with dressing and bathing, \"but it takes it out of me. I could do some things but I need help throughout the day.\"    It is of note that the pt is actively getting dialysis; has to sit still for 4 hours  Pt reports some numbness in fingers on left side (L sided forearm is where dialysis is put in) and notes difficulty with thinking and memory since stroke. No changes in vision     He enjoys spending time with nieces and nephews and would love to get a dog.    PATIENT GOAL: \"I'd really like to get some use of my right hand.\" \"I would like to slowly get more energy.\"    HISTORY OF PRESENT ILLNESS:     PMH:   Past Medical History:   Diagnosis Date    Acute kidney injury (HCC)     Ambulates with cane     Anuria     Anxiety     Cellulitis of right elbow 03/31/2021    Chronic kidney disease     Depression     Diabetes mellitus (HCC)     Diarrhea     Emesis 10/24/2020    End stage renal disease (HCC) 02/11/2018    Formatting of this note might be different from the original. Last Assessment & Plan:  Secondary to DM.  On nightly PD.  Followed by Ne[facility].  [facility] Formatting of this note might be different from the original. Last Assessment & Plan:  Formatting of this note might be different from the original. 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    Esophagitis 07/21/2015    Falls     Gastroparesis     " GERD (gastroesophageal reflux disease)     History of shingles 2010    History of transfusion 02/2018    no adverse reaction    Hyperlipidemia     Hyperphosphatemia     Hypertension     Hypoglycemia 07/15/2022    Itching     Mastoiditis of right side 07/15/2022    Muscle weakness     general unsteadiness    Obesity (BMI 30.0-34.9) 09/09/2019    Orthostatic hypotension 10/25/2020    Peripheral polyneuropathy 11/20/2019    PONV (postoperative nausea and vomiting) 01/26/2018    Protein-calorie malnutrition (HCC) 11/23/2020    Recurrent peritonitis (HCC) due to peritoneal dialysis catheter 07/31/2020    Retinopathy     Seizures (HCC)     early 2020 - one time    Skin abnormality     some dime size areas where skin was scratched from itching    Spontaneous bacterial peritonitis (HCC) 10/19/2020    Squamous cell skin cancer     left temple    Stroke (HCC)     x2 - off balance/no driving/fatigue    Swelling of both lower extremities     Traumatic onycholysis 07/21/2022    Vomiting     Wears glasses        Past Surgical Hx:   Past Surgical History:   Procedure Laterality Date    CARDIAC ELECTROPHYSIOLOGY PROCEDURE N/A 9/21/2023    Procedure: Cardiac loop recorder explant;  Surgeon: Parish Morgan MD;  [facility]: [facility];  Service: Cardiology    CARDIAC LOOP RECORDER  05/2018    COLONOSCOPY      EGD      EYE SURGERY Right     IR AV FISTULAGRAM/GRAFTOGRAM  02/23/2021    IR CEREBRAL ANGIOGRAPHY  1/12/2024    IR CEREBRAL ANGIOGRAPHY / INTERVENTION  1/5/2024    IR TUNNELED CENTRAL LINE PLACEMENT  02/16/2021    IR TUNNELED DIALYSIS CATHETER PLACEMENT  11/18/2020    IR TUNNELED DIALYSIS CATHETER REMOVAL  02/12/2021    IR TUNNELED DIALYSIS CATHETER REMOVAL  03/11/2021    MOHS SURGERY Left 12/14/2022    Left temple with Dr. Hassan    PERITONEAL CATHETER INSERTION N/A 08/27/2018    Procedure: UNROOF PD CATHETER;  Surgeon: Felipe Lindo DO;  Location: [facility];  Service: General    OR ARTERIOVENOUS ANASTOMOSIS OPEN  "DIRECT Left 11/09/2020    Procedure: CREATION FISTULA  ARTERIOVENOUS (AV) - LEFT WRIST;  Surgeon: Placido Altamirano MD;  Location: [facility];  Service: Vascular    VA ESOPHAGOGASTRODUODENOSCOPY TRANSORAL DIAGNOSTIC N/A 04/18/2019    Procedure: ESOPHAGOGASTRODUODENOSCOPY (EGD);  Surgeon: Ale Figueroa MD;  Location: [facility];  Service: Gastroenterology    VA LAPS INSERTION TUNNELED INTRAPERITONEAL CATHETER N/A 08/06/2018    Procedure: LAPAROSCOPIC PD CATHETER PLACEMENT;  Surgeon: Felipe Lindo DO;  Location: [facility];  Service: General    VA REMOVAL TUNNELED INTRAPERITONEAL CATHETER N/A 11/18/2020    Procedure: REMOVAL CATHETER PERITONEAL DIALYSIS;  Surgeon: Abdifatah Ty MD;  Location[facility];  Service: General    TONSILLECTOMY      UPPER GASTROINTESTINAL ENDOSCOPY          Pain:  Location: \"In my feet from neuropathy, it's more annoying than anything.\"     Objective    Upper Extremities:  Pt is right hand dominant    Range of Motion:  AROM:   R UE   - Shoulder flexion: 130 degrees  - Shoulder extension: 70 degrees  - Elbow flexion 135 degrees  - Elbow extension: Full  - Wrist flexion: 48 degrees  - Wrist extension: 35 degrees  - Pronation: 78 degrees  - Supination: 58 degrees  - Finger extension:  Almost full, limited by tight flexors  - Finger flexion: 60-70 degrees at all MP's, ~75-85 at all PIP's    L UE : 100%     Manual Muscle Testing:  R UE:  - Shoulder flexors: 5/5  - Shoulder extensors: 5/5  - Shoulder abductors: 5/5   - Shoulder external rotators: 4+/5   - Shoulder internal rotators: 5/5  - Elbow flexors: 5/5  - Elbow extensors: 4/5  - Wrist flexors: 3-/5  - Wrist extensors: 3-/5  L UE:  - Shoulder flexors: 5/5  - Shoulder extensors: 5/5  - Shoulder abductors: 5/5   - Shoulder external rotators: 5/5   - Shoulder internal rotators: 5/5  - Elbow flexors: 5/5  - Elbow extensors: 5/5  - Wrist flexors: 5/5  - Wrist extensors: 5/5                  ANGEL: RUE: 34lb (15lb) LUE: 100lb  The age norm is " approximately R: 116.8;bs and L: 112.8 lbs, indicating decreased  strength..                 2 point pinch: RUE: 4, LUE: 12 (age norms are 18 lbs)  3 point pinch: RUE: 7, LUE: 10 (age norms are 24 lbs)  Lateral pinch: RUE: 8, LUE: 21 (age norms are 25 lbs)                  NINE-HOLE PEG TEST: assesses dexterity/fine motor coordination   R hand: 1 min 16 second. (Prior: 1 min. 31 secs. with 2 pegs dropped)  L hand: 38.84 seconds (previously: 39.59 seconds)  Pt demonstrates decreased FMC compared to norms for age/sex (L: 18.62 seconds and R: 17.71 seconds)     Functional Dexterity Test: assesses patient's ability to use the hand for daily tasks requiring a 3-jaw federico prehension between the fingers and the thumb. Completed in a zig-zag pattern with unaffected UE first  5-second penalty (each):  -Supinating forearm to assist  -Touching the board for assist    10-second penalty:  -Dropping a peg    R UE: 2 minutes and 53 second +6 supinations, 3 board touches, and 3 drops (adding 75 seconds) = 248 seconds   L UE:  58.39 secs. (previously: 45 seconds)     Norms based on age/sex: (Lucy et. al., 2013)    Age Group Sex Non-Dominant (50th percentile) Dominant (50th percentile)   20-49 Male 23.2 24.1     Pt performance on Functional Dexterity Test implies non functional hand compared to norms for age/sex.    Score by Functional Level Range (dominant hand) Range (non-dominant hand)   Functional  16-25 seconds 18-27 seconds   Moderately Functional  26-33 seconds 28-45 seconds   Minimally Functional 34-50 seconds 46-55 seconds   Nonfunctional >50 seconds >55 seconds     Subluxation: NONE    Scapular winging: MINIMAL    FUGYL JOHNS ASSESSMENT OF MOTOR RECOVERY AFTER STROKE:  R UE:  UPPER EXTREMITY SEATED: 36   WRIST: 7/10   HAND: 12/14   COORDINATION/SPEED: 4/6   OVERALL SCORE: 59/66   (Clinical change= 5 points, severe impairment <19, and mild impairment is >50)         Functional Cognition:  Highest level of education:  [facility]     Morrow Cognitive Assessment Version 8.2 (MoCA V8.2) 24 not re-assessed this re-eval  Visuospatial/executive functionin/5  Naming:  3/3  Memory: 1st trial:  , 2nd trial:    Attention/concentration: 2  List of letters:   Serial Seven Subtraction:  3/3 w/ 0 errors  Language/sentence repetition: 2/2  (previously: )  Language Fluency: 0/ (previously:   Abstract/Correlational Thinkin/2 (previously: )  Delayed Recall:  (previously: 3/5)  Orientation:                Memory Index Score: 13/15 (previously: 11/15)  MoCA V1 8.2 Raw Score: 26/3 (previously: ), MIS:  13/15, indicative of no neurocognitive impairments.     MoCA Scoring              Normal: 26+              Mild Cognitive Impairment: 18-25               Moderate Cognitive Impairment: 10-17              Severe Cognitive Impairment: <10     Trail making Part A and Part B: 24 not re-assessed this re-eval  Part A: 1 minute and 2 seconds independently (previously:40.45 seconds independently) DECLINE  Part B: 1 minute and 53.92 seconds independently (previously:1 minute and 47.62 with 0 VCs for recall of instructions, problem solving) DECLINE  Indicating deficits: Part A > 24.40 seconds and Part B > 50.68 seconds       Contextual Memory Test: 24 not re-assessed this re-eval  Immediate:10/20 with 0 confabulation  (previously: 3/20, 0 confabulations) IMPROVED  Delayed: 10/20  (previously: 3/20, 0 confabulations) IMPROVED       GOALS:   Short Term Goals:  Pt will increase R UE  strength to 18 lbs to complete IADLs ACHIEVED   Pt will increase R FMC by being able to don 5 pegs in to 9 hole peg board in 1 minute and 40 seconds on 9 hole peg to complete ADLs and IADLs GOAL MET   Pt will increase  R UE strength to complete  of hand section of fugyl benavides for tabletop tasks ACHIEVED 24  Pt will increase  R UE strength to complete 4/6 of coordination section of fugyl benavides for tabletop  tasks ACHIEVED 5/23/24        Long Term Goals: 8-12 weeks  Pt will increase R UE  strength to 25 lbs to complete IADLs ACHIEVED 5/23/24  Pt will increase R FMC by being able to don all 9 pegs in to 9 hole peg board in 3 minutes on 9 hole peg to complete ADLs and IADLs GOAL MET   Pt will increase  R UE strength to complete 10/14 of hand section of fugyl benavides for tabletop tasks ACHIEVED 5/23/24  Pt will increase  R UE strength to complete 5/6 of coordination section of fugyl benavides for tabletop tasks PROGRESSING    New Goals as 5/23/24  Pt will demonstrate improved  strength as evidenced by increased ANGEL dynamometer to 40 lbs for improved performance in ADLs/IADLs  Pt will demonstrate improved lateral pinch strength as evidenced by increase to 6 lbs for improved performance in ADLs/IADLs  Pt will demonstrate improved three point pinch strength as evidenced by increase to 9 lbs for improved performance in ADLs/IADLs  Pt will demonstrate improved tip pinch strength as evidenced by increase to 10 lbs for improved performance in ADLs/IADLs  Pt will demonstrate improved FMC required for ADLs/IADLs as demonstrated by completing 9-hole peg test within 1 minute and 5 seconds  Pt will demonstrate improved dexterity required for ADLs/IADLs as demonstrated by completing FDT within 200 seconds, including penalties.      OTHER PLANNED THERAPY INTERVENTIONS:   Supine, seated, and in stance neuro re-ed  Tricep AG  NMES/FES  FMC/prehension  Timed Trials  Manual tx  Hand to target  Sensory re-ed  Seated functional reach: crossing midline  Supine place and hold  WBearing strategies   Closed chain activities  Open chain activities  Internal and external memory aides        Objective Measurement Tracker:    IE (2/15/24) 1st Re-eval:   (3/19/24) 2nd Re-eval:  (4/22/24) 3rd Re-eval:     MoCA 24/30 NOT TODAY/30 23/30 Not reassessed /30   TM Test A   To be completed next session Not reassessed    TM Test B   To be completed next  session Not reassessed    CMT Immediate: 3/20, 0 confabulations  Delayed: 3/20, 0 confabulations NOT TODAY To be completed next session Not reassessed    Nine Hole Peg Test R hand: 3 pegs in 1 minute and 48.49 seconds   L hand: 35.14 seconds   R hand: all 9 pegs in and out in 1 minute and 36 seconds    L hand: 39.59 seconds  R hand: all 9 pegs in and out in 1 min. 31 secs. With 2 drops. L hand: 38.84 seconds. 1 min 16 seconds    Functional Dexterity Test NOT TODAY R UE: 2 minutes and 6 seconds to complete 8 with compensatory strategies including the board and 1 peg drop  L UE: 45 seconds  R UE: 3 mins. 50 secs. To complete with 7 drops/  compensations.   L UE: 58.39 secs. 258 seconds     Strength R: 15lb,   L: 100lb R: 15lbs  L: 100lbs  R: 15 lbs  L: 100 lbs 35 lbs    Lateral Pinch Strength NOT TODAY  Not today   8 lbs    2 Point Pinch Strength NOT TODAY   Not today   4 lbs     3 Point Pinch Strength NOT TODAY   Not today  7 lbs     Manual Muscle Testing             Fugl Harrison UE: 36/36  Wrist: 10/10  Hand: 6/14  Coordination: 2/6 NOT TODAY  UE: 28/36  Wrist: 7/10  Hand: 10/14  Coordination:  3/6 UE: 36  Wrist: 7/10  Hand: 12/14  Coordination:   4/6  Overall 59/66

## 2024-05-23 NOTE — PROGRESS NOTES
"Daily Note     Today's date: 2024  Patient name: Mateus Mckeon  : 1985  MRN: [id number]  Referring provider: Dania Sher DO  Dx:   Encounter Diagnosis     ICD-10-CM    1. Acute CVA (cerebrovascular accident) (HCC)  I63.9                     POC expires Auth Status Total   Visits  Start date  Expiration date PT/OT + Visit Limit? Co-Insurance   24     bomn                                                         Subjective: Patient reports he is feeling pretty good today.       Objective: See treatment diary below  *FLUID RESTRICTION- do not offer water*       NMR (3# ankle weights - SOLO STEP):  - Fwd/bwd walking 10x  - Agility ladder fwd in/out x 4 laps   - Agility ladder lateral in/out x 4 laps   - Sidestepping to blaze pod taps (random mode) x 2 minutes  - Fwd/ bwd to blaze pod taps (random mode) x 2 minutes, while holding 10# tidal tank  - Step ups (4\") from foam x 2 minutes  - Static standing on foam x 6 min        Assessment: Patient tolerated treatment session well today with continued focus on balance, strength, and mobility. Improvements in endurance observed today by less subjective c/o fatigue and fewer/shorter rest breaks. He continues to lose balance posteriorly when on foam surface. Plan to trial gait belt next visit instead of solo step due to improvements in stability with ambulatory activities. He continues to require frequent rest breaks t/o session due to decreased endurance. Patient would benefit from continued PT to improve balance, endurance, and reduce fall risk.       Plan: Continue per plan of care.  Wean off Solo Step, try just using gait belt next visit         Outcome Measures Initial Eval  24 Re-eval  24 PN  2024 RE  2024     5xSTS 31.25 sec 52.20 sec no UE's    (1 LOB) 19.91 sec no UE 20.14 sec  No UE      TUG 16.3 sec with rollator    23.6 sec no AD 20.68 sec with rollator    18.44 sec no AD 23.41 sec with rollator    12.92 sec no AD 19.69 " sec with rollator    12.45 sec no AD      10 meter  Next visit> 0.88 m/s 1.08 m/s     MEDRANO  Next visit> 44/56 48/56     FGA 12/30 Next visit> 12/30 12/30     6MWT 900 ft 710 ft  Rollator  760 ft no  ft  No AD      mCTSIB  FTEO firm  FTEC firm  FTEO foam  FTEC foam   Next visit> 30 sec  30 sec  5.5 sec  defer 30 sec  30 sec  30 sec  1 sec     ABC  65% 43.75% 56.88%

## 2024-05-23 NOTE — PROGRESS NOTES
Established patient with class findings presents for mycotic toenail care.  Vascular exam:  DP 0/4 bilateral; PT 0 4 bilateral  Derm exam:  Each great toenail is thickened and yellow with subungual debris  Diagnoses: Mycotic toenails great toe bilateral; diabetes mellitus  Treatment:  Debrided each mycotic toenail reducing nails in thickness and removing devitalized tissue and subungual debris.  Electrical and manual debridement performed.  Trimmed remaining elongated toenails.

## 2024-05-30 ENCOUNTER — OFFICE VISIT (OUTPATIENT)
Facility: CLINIC | Age: 39
End: 2024-05-30
Payer: MEDICARE

## 2024-05-30 ENCOUNTER — OFFICE VISIT (OUTPATIENT)
Dept: NEPHROLOGY | Facility: CLINIC | Age: 39
End: 2024-05-30
Payer: MEDICARE

## 2024-05-30 VITALS
HEIGHT: 65 IN | SYSTOLIC BLOOD PRESSURE: 142 MMHG | WEIGHT: 197 LBS | DIASTOLIC BLOOD PRESSURE: 74 MMHG | BODY MASS INDEX: 32.82 KG/M2

## 2024-05-30 DIAGNOSIS — Z01.818 PRE-TRANSPLANT EVALUATION FOR ESRD (END STAGE RENAL DISEASE): ICD-10-CM

## 2024-05-30 DIAGNOSIS — I63.89 CEREBROVASCULAR ACCIDENT (CVA) DUE TO OTHER MECHANISM (HCC): Primary | ICD-10-CM

## 2024-05-30 DIAGNOSIS — I63.9 ACUTE CVA (CEREBROVASCULAR ACCIDENT) (HCC): Primary | ICD-10-CM

## 2024-05-30 DIAGNOSIS — G47.33 OSA (OBSTRUCTIVE SLEEP APNEA): Primary | ICD-10-CM

## 2024-05-30 PROCEDURE — 97112 NEUROMUSCULAR REEDUCATION: CPT

## 2024-05-30 PROCEDURE — 99215 OFFICE O/P EST HI 40 MIN: CPT | Performed by: INTERNAL MEDICINE

## 2024-05-30 PROCEDURE — 97112 NEUROMUSCULAR REEDUCATION: CPT | Performed by: PHYSICAL THERAPIST

## 2024-05-30 NOTE — PROGRESS NOTES
NEPHROLOGY OFFICE VISIT   Mateus Mckeon 38 y.o. male MRN: [id number]  5/30/2024    Reason for Visit: renal transplant evaluation f/u    ASSESSMENT and PLAN:    I had the pleasure of seeing Mr Mckeon today in the renal clinic for the continued management of renal transplant evaluation f/u.     Mateus Mckeon is a 38 y.o. 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     ESRD presumed to be due to DM/HTN.  On HD since 2018.     Native disease secondary to diabetes/hypertension not biopsy proven     Prior MRI brain -new area of T2 hyperintensity in the right middle and right inferior cerebellar peduncle with resolution of a focus of hyperintensity in the right subinsular region.  Suggesting dissemination in time and space.  There is concern for demyelinating disease.     Renal Doppler study with no evidence of occlusive disease bilaterally.  There is possible intrinsic parenchymal disease on the left kidney.  Left renal artery was not able to be evaluated due to bowel gas.     CT scan of the abdomen and pelvis in November 2020 with moderate ascites, reason Deon and omental in edema stable from prior, otherwise no acute abnormality.     CT scan in November 2020 with small pulmonary nodule the right middle lobe.     Echocardiogram with SANJEEV in 2018 with EF 60%.     ECHO in July 2020 - EF 65%;  mild diastolic dysfunction;      Stress test is nl in 2/2019 July 2021-patient underwent right left heart catheterization.  Mild-to-moderate elevated LVE DS, moderate pulmonary hypertension, no significant CAD, mid RCA 40% stenosis     October 2021-patient saw pulmonologist due to incidental finding of nodules in the lung.  Recommended repeat CT scan in 2022.  But no absolute contraindication from pulmonary standpoint for renal transplantation.     October 2021-patient saw [facility]     December 2021-patient saw pulmonary hypertensive specialist at [facility] -was felt to be due to secondary to volume overload.  Pulmonary vasodilators was contraindicated.  Had repeat echocardiogram December 2021. States that there was reassuring features of normal RV size and function.  RVSP only 29. Therefore euvolemic.  -patient did not show features of moderate pulmonary hypertension that was seen 5 months prior.     Patient has sleep study with severe RACHEAL in July 2022.     July 2022-patient was admitted for hypoglycemia.  Insulin regimen was adjusted.     September 2022-patient saw [facility]- the clinical significance is unclear but there may be some association for arterial thrombus and prothrombin gene mutation. thrombophilia work-up does indicate patient has high risk for VTE in the perioperative period     October 2022-squamous cell carcinoma removed from left temple and status post Mohs procedure December 2022.     Saw [facility] pul[facility] team - pulm HTN felt to be 2/2 to vol overload  - ECHO EF 60%    Since I last saw the patient in May 2023,    November 2023-patient was admitted to the hospital with concern for gastroenteritis and improved with supportive care.  Patient was also noted to be hypertensive in the setting of not being able to take his medications due to severe nausea and vomiting prior to admission.  Improved with restarting blood pressure medications.    January 2024-patient  was admitted to the hospital with progressive worsening headache.  Had 2-week admission.  Was found initially to have concern of subarachnoid hemorrhage of unclear etiology.  Underwent cerebral angiogram with neurosurgery which was unrevealing.  MRI of the brain showed multifocal acute infarcts concerning for embolic infarcts.  Was felt to be possibly a complication of cerebral angiogram.  Had repeat cerebral angiogram in January 12, 2024 which was negative for subarachnoid hemorrhage.  Course was complicated by continued hypertension requiring medication adjustments.  There is also mastoid opacification on imaging but no sign of infection and therefore no further ENT intervention was recommended.  - MRI of the brain with evolving multifocal subacute infarcts without evidence of interval acute infarct.  Scattered areas of siderosis redemonstrated.  Large mastoid air cell effusion redemonstrated  - CT of the abdomen pelvis-trace pleural abdominal fluid and soft tissue edema likely related to patient's volume status.    Patient had another admission from January 25 to January 27-had presented with acute abdominal pain, nausea, vomiting.  Resolved with supportive care.  Had hypertensive urgency in the setting of pain.  Labetalol was adjusted.  - Echocardiogram with EF 70%, moderate concentric hypertrophy, LA moderately dilated, RA mildly dilated, trace MV regurgitation.    February 2024-patient was admitted to the hospital with headaches and elevated blood pressures at home.  Required Cardene drip and ICU management.    April 2023-patient saw neurosurgery team for follow-up.  Regarding benign perimesencephalic hemorrhage, neurosurgery team felt no further evaluation or workup was needed.  With regards to stroke, patient has residual weakness right upper extremity and has had improvement with therapy.  Also neuropathic pain.  From a neurosurgery standpoint, patient was cleared for renal transplant.          Plan       Will review the patient's case with the transplant committee meeting for yearly follow-up.  Since last being seen, patient has had admission in January concerning for CVA and SAH.  Followed up with neurology posthospitalization and felt to be a benign cause of SAH therefore no further intervention was needed and patient was advised to continue to follow with neurology team and from a neurosurgery standpoint no objection for renal transplantation. With weakness RUE but improving. In OT and PT and improving RUE. Pt was advised to talk to ENT about mastoid findings and has upcoming appt. Pt advised to reestablish care with sleep medicine. Pt appears high medical risk of complications post transplant and we reviewed this today with pt and mother and they understand. He is currently inactive on our list. Will review with committee regarding moving forward with active listing but need to review with AOT as they also saw pt recently.      Recent CT scan April 23 with significant mastoid effusion-I have asked our office to reach out to PAC who saw pt at ENT to learn f/u plans regarding CT findings.     History of severe sleep apnea-pt does not tolerate CPAP     History of pulmonary nodules     History of stroke and lacunar infarct. 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  Patient followed with hematology and neurology prior.  On plavix and aspirin  -Recommendation-high risk of VTE in the perioperative period and DVT proph in perioperative period prior.  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    It was a pleasure evaluating your patient in the office today. Thank you for allowing our team to participate in the care of Mr Mateus Mckeon. Please do not hesitate to contact our team if further issues/questions shall arise in the interim.     No problem-specific Assessment & Plan notes found for this encounter.      I have spent a total time of 50 minutes on 05/30/24 in caring for this patient including Diagnostic results, Prognosis, Risks and benefits of tx options, Instructions for management, Patient and family education, Risk factor reductions, Impressions, Counseling / Coordination of care, Documenting in the medical record, Reviewing / ordering tests, medicine, procedures  , and Obtaining or reviewing history  .    HPI:    Patient denies new complaints.  No fevers, chills, nausea, vomiting.  Tolerating dialysis.    PATIENT INSTRUCTIONS:    Patient Instructions   1) We will review your case at the transplant committee meeting and will review our decision with you in approximately 4 weeks over the phone.  2) If you do not receive a call from [facility] within 4 weeks, please call our local Nephrology office.  3) From a renal transplant evaluation purpose, we will see you once a year in our local office for medical follow-up.  4) Once we call you with our decision regarding further evaluation, you will receive further instruction over the phone and in the mail regarding the next steps to complete the transplant evaluation.  5) All of your non transplant evaluation follow up regarding your regular medical follow ups, your renal care follow ups, and any other specialists visits you are following with should continue as you are advised by those respective physicians and continue to follow with their management and care.  And if there is any emergency please go to the nearest urgent care or emergency room.  6)  "please continue follow up with your ENT team as you have upcoming appt  7) please see the sleep medicine team to reestablish care for your sleep apnea        OBJECTIVE:  Current Weight: Weight - Scale: 89.4 kg (197 lb)  Vitals:    05/30/24 1519   BP: 142/74   BP Location: Right arm   Patient Position: Sitting   Cuff Size: Standard   Weight: 89.4 kg (197 lb)   Height: 5' 5\" (1.651 m)    Body mass index is 32.78 kg/m².      REVIEW OF SYSTEMS:    Review of Systems   All other systems reviewed and are negative.      PHYSICAL EXAM:      Physical Exam  Vitals and nursing note reviewed.   Constitutional:       General: He is not in acute distress.     Appearance: He is well-developed. He is not diaphoretic.   HENT:      Head: Normocephalic and atraumatic.   Eyes:      General: No scleral icterus.        Right eye: No discharge.         Left eye: No discharge.      Conjunctiva/sclera: Conjunctivae normal.   Cardiovascular:      Rate and Rhythm: Normal rate and regular rhythm.      Heart sounds: Normal heart sounds. No murmur heard.     No friction rub. No gallop.   Pulmonary:      Effort: Pulmonary effort is normal. No respiratory distress.      Breath sounds: Normal breath sounds. No wheezing or rales.   Chest:      Chest wall: No tenderness.   Abdominal:      General: Bowel sounds are normal. There is no distension.      Palpations: Abdomen is soft.      Tenderness: There is no abdominal tenderness. There is no rebound.   Musculoskeletal:         General: No tenderness or deformity. Normal range of motion.      Cervical back: Normal range of motion and neck supple.      Right lower leg: No edema.      Left lower leg: No edema.      Comments: Right upper extremity weakness  When compared to left upper extremity weakness.  But is able to move arm, squeeze fingers, extend arm.   Skin:     General: Skin is warm and dry.      Coloration: Skin is not pale.      Findings: No erythema or rash.   Neurological:      Mental Status: " He is alert and oriented to person, place, and time.      Cranial Nerves: No cranial nerve deficit.      Comments: Uses a walker to ambulate.  But is able to walk without a walker to the exam table.   Psychiatric:         Behavior: Behavior normal.         Thought Content: Thought content normal.         Judgment: Judgment normal.         Medications:    Current Outpatient Medications:     aspirin (ECOTRIN LOW STRENGTH) 81 mg EC tablet, Take 81 mg by mouth daily, Disp: , Rfl:     atorvastatin (LIPITOR) 40 mg tablet, Take 1 tablet (40 mg total) by mouth every evening, Disp: 90 tablet, Rfl: 3    b complex vitamins capsule, Take 1 capsule by mouth daily before lunch , Disp: , Rfl:     calcium acetate (PHOSLO) capsule, Take 1 capsule (667 mg total) by mouth 3 (three) times a day, Disp: 90 capsule, Rfl: 0    cinacalcet (SENSIPAR) 60 MG tablet, Take 1 tablet (60 mg total) by mouth daily, Disp: 30 tablet, Rfl: 0    escitalopram (LEXAPRO) 20 mg tablet, Take 1 tablet (20 mg total) by mouth daily, Disp: 90 tablet, Rfl: 2    famotidine (PEPCID) 40 MG tablet, TAKE 1 TABLET BY MOUTH IN THE MORNING, Disp: 90 tablet, Rfl: 0    gabapentin (NEURONTIN) 100 mg capsule, TAKE1 CAPSULES BY MOUTH AT BEDTIME, Disp: 90 capsule, Rfl: 3    GLUCAGON EMERGENCY 1 MG injection, , Disp: , Rfl: 3    Gvoke HypoPen 1-Pack 1 MG/0.2ML SOAJ, , Disp: , Rfl:     hydrALAZINE (APRESOLINE) 100 MG tablet, Take 1 tablet (100 mg total) by mouth 3 (three) times a day, Disp: 90 tablet, Rfl: 0    hydrOXYzine HCL (ATARAX) 10 mg tablet, Take 1 tablet (10 mg total) by mouth every 6 (six) hours as needed for itching or anxiety, Disp: 30 tablet, Rfl: 3    Insulin Disposable Pump (Omnipod 5 G6 Intro, Gen 5,) KIT, , Disp: , Rfl:     Insulin Disposable Pump (Omnipod 5 G6 Pod, Gen 5,) MISC, , Disp: , Rfl:     labetalol (NORMODYNE) 200 mg tablet, Take 2 tablets (400 mg total) by mouth 3 (three) times a day, Disp: 180 tablet, Rfl: 0    lisinopril (ZESTRIL) 40 mg tablet, Take  "1 tablet (40 mg total) by mouth daily, Disp: 30 tablet, Rfl: 0    metoclopramide (REGLAN) 5 mg tablet, Take 1 tablet by mouth three times daily as needed, Disp: 90 tablet, Rfl: 0    minoxidil (LONITEN) 2.5 mg tablet, Take 2 tablets (5 mg total) by mouth 2 (two) times a day (Patient taking differently: Take 5 mg by mouth daily), Disp: 120 tablet, Rfl: 0    NIFEdipine (PROCARDIA XL) 30 mg 24 hr tablet, Take 2 tablets (60 mg total) by mouth 2 (two) times a day, Disp: 120 tablet, Rfl: 0    NovoLOG 100 UNIT/ML injection, , Disp: , Rfl:     ondansetron (ZOFRAN) 4 mg tablet, Take 1 tablet (4 mg total) by mouth every 8 (eight) hours as needed for nausea or vomiting, Disp: 90 tablet, Rfl: 0    Patiromer Sorbitex Calcium (VELTASSA PO), Take 5 g by mouth once a week, Disp: , Rfl:     saccharomyces boulardii (FLORASTOR) 250 mg capsule, Take 250 mg by mouth daily, Disp: , Rfl:     SPS 15 GM/60ML suspension, TAKE 60 ML BY MOUTH SINGLE DOSE FOR EMERGENCY USE DURING MISSED HEMODIALYSIS, Disp: , Rfl:     traZODone (DESYREL) 50 mg tablet, TAKE 1/2 (ONE-HALF) TABLET BY MOUTH ONCE DAILY AT BEDTIME AS NEEDED FOR SLEEP, Disp: 30 tablet, Rfl: 2    cloNIDine (CATAPRES-TTS-3) 0.3 mg/24 hr, 1 patch once a week (Patient not taking: Reported on 5/17/2024), Disp: , Rfl:     Laboratory Results:        Invalid input(s): \"ALBUMIN\"    Results for orders placed or performed during the hospital encounter of 02/03/24   MRSA culture    Specimen: Nose; Nares   Result Value Ref Range    MRSA Culture Only       No Methicillin Resistant Staphlyococcus aureus (MRSA) isolated   CBC and differential   Result Value Ref Range    WBC 5.51 4.31 - 10.16 Thousand/uL    RBC 2.59 (L) 3.88 - 5.62 Million/uL    Hemoglobin 8.2 (L) 12.0 - 17.0 g/dL    Hematocrit 25.7 (L) 36.5 - 49.3 %    MCV 99 (H) 82 - 98 fL    MCH 31.7 26.8 - 34.3 pg    MCHC 31.9 31.4 - 37.4 g/dL    RDW 16.0 (H) 11.6 - 15.1 %    MPV 10.7 8.9 - 12.7 fL    Platelets 268 149 - 390 Thousands/uL    nRBC 0 " /100 WBCs    Segmented % 54 43 - 75 %    Immature Grans % 1 0 - 2 %    Lymphocytes % 27 14 - 44 %    Monocytes % 9 4 - 12 %    Eosinophils Relative 8 (H) 0 - 6 %    Basophils Relative 1 0 - 1 %    Absolute Neutrophils 3.00 1.85 - 7.62 Thousands/µL    Absolute Immature Grans 0.04 0.00 - 0.20 Thousand/uL    Absolute Lymphocytes 1.48 0.60 - 4.47 Thousands/µL    Absolute Monocytes 0.47 0.17 - 1.22 Thousand/µL    Eosinophils Absolute 0.45 0.00 - 0.61 Thousand/µL    Basophils Absolute 0.07 0.00 - 0.10 Thousands/µL   Comprehensive metabolic panel   Result Value Ref Range    Sodium 143 135 - 147 mmol/L    Potassium 4.3 3.5 - 5.3 mmol/L    Chloride 100 96 - 108 mmol/L    CO2 33 (H) 21 - 32 mmol/L    ANION GAP 10 mmol/L    BUN 19 5 - 25 mg/dL    Creatinine 8.36 (H) 0.60 - 1.30 mg/dL    Glucose 176 (H) 65 - 140 mg/dL    Calcium 8.7 8.4 - 10.2 mg/dL    AST 15 13 - 39 U/L    ALT 12 7 - 52 U/L    Alkaline Phosphatase 87 34 - 104 U/L    Total Protein 6.5 6.4 - 8.4 g/dL    Albumin 4.2 3.5 - 5.0 g/dL    Total Bilirubin 0.63 0.20 - 1.00 mg/dL    eGFR 7 ml/min/1.73sq m   Basic metabolic panel   Result Value Ref Range    Sodium 141 135 - 147 mmol/L    Potassium 4.0 3.5 - 5.3 mmol/L    Chloride 101 96 - 108 mmol/L    CO2 30 21 - 32 mmol/L    ANION GAP 10 mmol/L    BUN 20 5 - 25 mg/dL    Creatinine 8.56 (H) 0.60 - 1.30 mg/dL    Glucose 151 (H) 65 - 140 mg/dL    Calcium 8.3 (L) 8.4 - 10.2 mg/dL    eGFR 6 ml/min/1.73sq m   Magnesium   Result Value Ref Range    Magnesium 2.1 1.9 - 2.7 mg/dL   Phosphorus   Result Value Ref Range    Phosphorus 3.3 2.7 - 4.5 mg/dL   CBC and Platelet   Result Value Ref Range    WBC 5.49 4.31 - 10.16 Thousand/uL    RBC 2.51 (L) 3.88 - 5.62 Million/uL    Hemoglobin 7.9 (L) 12.0 - 17.0 g/dL    Hematocrit 25.0 (L) 36.5 - 49.3 %     (H) 82 - 98 fL    MCH 31.5 26.8 - 34.3 pg    MCHC 31.6 31.4 - 37.4 g/dL    RDW 16.5 (H) 11.6 - 15.1 %    Platelets 239 149 - 390 Thousands/uL    MPV 10.4 8.9 - 12.7 fL   CBC and  differential   Result Value Ref Range    WBC 4.65 4.31 - 10.16 Thousand/uL    RBC 2.36 (L) 3.88 - 5.62 Million/uL    Hemoglobin 7.4 (L) 12.0 - 17.0 g/dL    Hematocrit 23.6 (L) 36.5 - 49.3 %     (H) 82 - 98 fL    MCH 31.4 26.8 - 34.3 pg    MCHC 31.4 31.4 - 37.4 g/dL    RDW 16.9 (H) 11.6 - 15.1 %    MPV 9.9 8.9 - 12.7 fL    Platelets 238 149 - 390 Thousands/uL    nRBC 0 /100 WBCs    Segmented % 55 43 - 75 %    Immature Grans % 1 0 - 2 %    Lymphocytes % 26 14 - 44 %    Monocytes % 9 4 - 12 %    Eosinophils Relative 8 (H) 0 - 6 %    Basophils Relative 1 0 - 1 %    Absolute Neutrophils 2.57 1.85 - 7.62 Thousands/µL    Absolute Immature Grans 0.03 0.00 - 0.20 Thousand/uL    Absolute Lymphocytes 1.22 0.60 - 4.47 Thousands/µL    Absolute Monocytes 0.41 0.17 - 1.22 Thousand/µL    Eosinophils Absolute 0.36 0.00 - 0.61 Thousand/µL    Basophils Absolute 0.06 0.00 - 0.10 Thousands/µL   Basic metabolic panel   Result Value Ref Range    Sodium 141 135 - 147 mmol/L    Potassium 4.2 3.5 - 5.3 mmol/L    Chloride 101 96 - 108 mmol/L    CO2 30 21 - 32 mmol/L    ANION GAP 10 mmol/L    BUN 30 (H) 5 - 25 mg/dL    Creatinine 10.88 (H) 0.60 - 1.30 mg/dL    Glucose 191 (H) 65 - 140 mg/dL    Calcium 8.0 (L) 8.4 - 10.2 mg/dL    eGFR 5 ml/min/1.73sq m   Magnesium   Result Value Ref Range    Magnesium 2.2 1.9 - 2.7 mg/dL   Phosphorus   Result Value Ref Range    Phosphorus 4.3 2.7 - 4.5 mg/dL   Metanephrine, Fractionated Plasma Free   Result Value Ref Range    Normetanephrine, Free 150.3 0.0 - 210.1 pg/mL    Metanephrine, Free <25.0 0.0 - 88.0 pg/mL   CBC and differential   Result Value Ref Range    WBC 5.05 4.31 - 10.16 Thousand/uL    RBC 2.51 (L) 3.88 - 5.62 Million/uL    Hemoglobin 7.9 (L) 12.0 - 17.0 g/dL    Hematocrit 25.0 (L) 36.5 - 49.3 %     (H) 82 - 98 fL    MCH 31.5 26.8 - 34.3 pg    MCHC 31.6 31.4 - 37.4 g/dL    RDW 17.5 (H) 11.6 - 15.1 %    MPV 9.5 8.9 - 12.7 fL    Platelets 231 149 - 390 Thousands/uL    nRBC 0 /100  WBCs    Segmented % 58 43 - 75 %    Immature Grans % 0 0 - 2 %    Lymphocytes % 24 14 - 44 %    Monocytes % 11 4 - 12 %    Eosinophils Relative 6 0 - 6 %    Basophils Relative 1 0 - 1 %    Absolute Neutrophils 2.91 1.85 - 7.62 Thousands/µL    Absolute Immature Grans 0.02 0.00 - 0.20 Thousand/uL    Absolute Lymphocytes 1.22 0.60 - 4.47 Thousands/µL    Absolute Monocytes 0.56 0.17 - 1.22 Thousand/µL    Eosinophils Absolute 0.28 0.00 - 0.61 Thousand/µL    Basophils Absolute 0.06 0.00 - 0.10 Thousands/µL   Comprehensive metabolic panel   Result Value Ref Range    Sodium 143 135 - 147 mmol/L    Potassium 4.3 3.5 - 5.3 mmol/L    Chloride 102 96 - 108 mmol/L    CO2 32 21 - 32 mmol/L    ANION GAP 9 mmol/L    BUN 22 5 - 25 mg/dL    Creatinine 7.25 (H) 0.60 - 1.30 mg/dL    Glucose 146 (H) 65 - 140 mg/dL    Calcium 8.2 (L) 8.4 - 10.2 mg/dL    AST 13 13 - 39 U/L    ALT 8 7 - 52 U/L    Alkaline Phosphatase 74 34 - 104 U/L    Total Protein 5.6 (L) 6.4 - 8.4 g/dL    Albumin 3.6 3.5 - 5.0 g/dL    Total Bilirubin 0.53 0.20 - 1.00 mg/dL    eGFR 8 ml/min/1.73sq m   Magnesium   Result Value Ref Range    Magnesium 2.1 1.9 - 2.7 mg/dL   Phosphorus   Result Value Ref Range    Phosphorus 3.7 2.7 - 4.5 mg/dL   CBC   Result Value Ref Range    WBC 4.42 4.31 - 10.16 Thousand/uL    RBC 2.36 (L) 3.88 - 5.62 Million/uL    Hemoglobin 7.5 (L) 12.0 - 17.0 g/dL    Hematocrit 23.8 (L) 36.5 - 49.3 %     (H) 82 - 98 fL    MCH 31.8 26.8 - 34.3 pg    MCHC 31.5 31.4 - 37.4 g/dL    RDW 18.3 (H) 11.6 - 15.1 %    Platelets 201 149 - 390 Thousands/uL    MPV 9.4 8.9 - 12.7 fL   Basic metabolic panel   Result Value Ref Range    Sodium 140 135 - 147 mmol/L    Potassium 4.2 3.5 - 5.3 mmol/L    Chloride 101 96 - 108 mmol/L    CO2 29 21 - 32 mmol/L    ANION GAP 10 mmol/L    BUN 34 (H) 5 - 25 mg/dL    Creatinine 9.00 (H) 0.60 - 1.30 mg/dL    Glucose 106 65 - 140 mg/dL    Calcium 8.2 (L) 8.4 - 10.2 mg/dL    eGFR 6 ml/min/1.73sq m   Magnesium   Result Value Ref  Range    Magnesium 2.1 1.9 - 2.7 mg/dL   Phosphorus   Result Value Ref Range    Phosphorus 5.1 (H) 2.7 - 4.5 mg/dL   TIBC Panel (incl. Iron, TIBC, % Iron Saturation)   Result Value Ref Range    Iron Saturation 55 (H) 15 - 50 %    TIBC 162 (L) 250 - 450 ug/dL    Iron 89 50 - 212 ug/dL    UIBC 73 (L) 155 - 355 ug/dL   Ferritin   Result Value Ref Range    Ferritin 495 (H) 24 - 336 ng/mL   Basic metabolic panel   Result Value Ref Range    Sodium 137 135 - 147 mmol/L    Potassium 4.2 3.5 - 5.3 mmol/L    Chloride 99 96 - 108 mmol/L    CO2 30 21 - 32 mmol/L    ANION GAP 8 mmol/L    BUN 22 5 - 25 mg/dL    Creatinine 6.10 (H) 0.60 - 1.30 mg/dL    Glucose 221 (H) 65 - 140 mg/dL    Calcium 8.3 (L) 8.4 - 10.2 mg/dL    eGFR 10 ml/min/1.73sq m   Calcium, ionized   Result Value Ref Range    Calcium, Ionized 1.06 (L) 1.12 - 1.32 mmol/L   CBC   Result Value Ref Range    WBC 4.93 4.31 - 10.16 Thousand/uL    RBC 2.53 (L) 3.88 - 5.62 Million/uL    Hemoglobin 8.2 (L) 12.0 - 17.0 g/dL    Hematocrit 25.4 (L) 36.5 - 49.3 %     (H) 82 - 98 fL    MCH 32.4 26.8 - 34.3 pg    MCHC 32.3 31.4 - 37.4 g/dL    RDW 18.4 (H) 11.6 - 15.1 %    Platelets 232 149 - 390 Thousands/uL    MPV 9.9 8.9 - 12.7 fL   Phosphorus   Result Value Ref Range    Phosphorus 4.8 (H) 2.7 - 4.5 mg/dL   Magnesium   Result Value Ref Range    Magnesium 2.1 1.9 - 2.7 mg/dL   ECG 12 lead   Result Value Ref Range    Ventricular Rate 74 BPM    Atrial Rate 74 BPM    MO Interval 172 ms    QRSD Interval 74 ms    QT Interval 442 ms    QTC Interval 490 ms    P Axis 60 degrees    QRS Axis 54 degrees    T Wave Axis 5 degrees   Fingerstick Glucose (POCT)   Result Value Ref Range    POC Glucose 124 65 - 140 mg/dl   Fingerstick Glucose (POCT)   Result Value Ref Range    POC Glucose 139 65 - 140 mg/dl   Fingerstick Glucose (POCT)   Result Value Ref Range    POC Glucose 222 (H) 65 - 140 mg/dl   Fingerstick Glucose (POCT)   Result Value Ref Range    POC Glucose 131 65 - 140 mg/dl    Fingerstick Glucose (POCT)   Result Value Ref Range    POC Glucose 190 (H) 65 - 140 mg/dl   Fingerstick Glucose (POCT)   Result Value Ref Range    POC Glucose 171 (H) 65 - 140 mg/dl   Fingerstick Glucose (POCT)   Result Value Ref Range    POC Glucose 152 (H) 65 - 140 mg/dl   Fingerstick Glucose (POCT)   Result Value Ref Range    POC Glucose 194 (H) 65 - 140 mg/dl     *Note: Due to a large number of results and/or encounters for the requested time period, some results have not been displayed. A complete set of results can be found in Results Review.

## 2024-05-30 NOTE — LETTER
May 30, 2024     Dania Sher DO  [address] [facility]  2nd Floor, [address]  [facility] [address]    Patient: Mateus Mckeon   YOB: 1985   Date of Visit: 5/30/2024       Dear Dr. Sher:    Thank you for referring Mateus Mckeon to me for evaluation. Below are my notes for this consultation.    If you have questions, please do not hesitate to call me. I look forward to following your patient along with you.         Sincerely,        Maya Riley MD        CC: MD Meredith Miller MD Nikunjkumar T Patel, MD  5/30/2024  3:57 PM  Sign when Signing Visit  NEPHROLOGY OFFICE VISIT   Mateus Mckeon 38 y.o. male MRN: [id number]  5/30/2024    Reason for Visit: renal transplant evaluation f/u    ASSESSMENT and PLAN:    I had the pleasure of seeing Mr Mckeon today in the renal clinic for the continued management of renal transplant evaluation f/u.     Mateus Mckeon is a 38 y.o. 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     ESRD presumed to be due to DM/HTN.  On HD since 2018.     Native disease secondary to diabetes/hypertension not biopsy proven     Prior MRI brain -new area of T2 hyperintensity in the right middle and right inferior cerebellar peduncle with resolution of a focus of hyperintensity in the right subinsular region.  Suggesting dissemination  in time and space.  There is concern for demyelinating disease.     Renal Doppler study with no evidence of occlusive disease bilaterally.  There is possible intrinsic parenchymal disease on the left kidney.  Left renal artery was not able to be evaluated due to bowel gas.     CT scan of the abdomen and pelvis in November 2020 with moderate ascites, reason Deon and omental in edema stable from prior, otherwise no acute abnormality.     CT scan in November 2020 with small pulmonary nodule the right middle lobe.     Echocardiogram with SANJEEV in 2018 with EF 60%.     ECHO in July 2020 - EF 65%; mild diastolic dysfunction;      Stress test is nl in 2/2019 July 2021-patient underwent right left heart catheterization.  Mild-to-moderate elevated LVE DS, moderate pulmonary hypertension, no significant CAD, mid RCA 40% stenosis     October 2021-patient saw pulmonologist due to incidental finding of nodules in the lung.  Recommended repeat CT scan in 2022.  But no absolute contraindication from pulmonary standpoint for renal transplantation.     October 2021-patient saw [facility] team     December 2021-patient saw pulmonary hypertensive specialist at [facility] -was felt to be due to secondary to volume overload.  Pulmonary vasodilators was contraindicated.  Had repeat echocardiogram December 2021. States that there was reassuring features of normal RV size and function.  RVSP only 29. Therefore euvolemic.  -patient did not show features of moderate pulmonary hypertension that was seen 5 months prior.     Patient has sleep study with severe RACHEAL in July 2022.     July 2022-patient was admitted for hypoglycemia.  Insulin regimen was adjusted.     September 2022-patient saw [facility]- the clinical significance is unclear but there may be some association for arterial thrombus and prothrombin gene mutation. thrombophilia work-up does indicate patient has high risk for VTE in the perioperative period      October 2022-squamous cell carcinoma removed from left temple and status post Mohs procedure December 2022.     Saw [facility] pulmonology team - pulm HTN felt to be 2/2 to vol overload  - ECHO EF 60%    Since I last saw the patient in May 2023,    November 2023-patient was admitted to the hospital with concern for gastroenteritis and improved with supportive care.  Patient was also noted to be hypertensive in the setting of not being able to take his medications due to severe nausea and vomiting prior to admission.  Improved with restarting blood pressure medications.    January 2024-patient was admitted to the hospital with progressive worsening headache.  Had 2-week admission.  Was found initially to have concern of subarachnoid hemorrhage of unclear etiology.  Underwent cerebral angiogram with neurosurgery which was unrevealing.  MRI of the brain showed multifocal acute infarcts concerning for embolic infarcts.  Was felt to be possibly a complication of cerebral angiogram.  Had repeat cerebral angiogram in January 12, 2024 which was negative for subarachnoid hemorrhage.  Course was complicated by continued hypertension requiring medication adjustments.  There is also mastoid opacification on imaging but no sign of infection and therefore no further ENT intervention was recommended.  - MRI of the brain with evolving multifocal subacute infarcts without evidence of interval acute infarct.  Scattered areas of siderosis redemonstrated.  Large mastoid air cell effusion redemonstrated  - CT of the abdomen pelvis-trace pleural abdominal fluid and soft tissue edema likely related to patient's volume status.    Patient had another admission from January 25 to January 27-had presented with acute abdominal pain, nausea, vomiting.  Resolved with supportive care.  Had hypertensive urgency in the setting of pain.  Labetalol was adjusted.  - Echocardiogram with EF 70%, moderate concentric hypertrophy, LA moderately dilated, RA  mildly dilated, trace MV regurgitation.    February 2024-patient was admitted to the hospital with headaches and elevated blood pressures at home.  Required Cardene drip and ICU management.    April 2023-patient saw neurosurgery team for follow-up.  Regarding benign perimesencephalic hemorrhage, neurosurgery team felt no further evaluation or workup was needed.  With regards to stroke, patient has residual weakness right upper extremity and has had improvement with therapy.  Also neuropathic pain.  From a neurosurgery standpoint, patient was cleared for renal transplant.          Plan      Will review the patient's case with the transplant committee meeting for yearly follow-up.  Since last being seen, patient has had admission in January concerning for CVA and SAH.  Followed up with neurology posthospitalization and felt to be a benign cause of SAH therefore no further intervention was needed and patient was advised to continue to follow with neurology team and from a neurosurgery standpoint no objection for renal transplantation. With weakness RUE but improving. In OT and PT and improving RUE. Pt was advised to talk to ENT about mastoid findings and has upcoming appt. Pt advised to reestablish care with sleep medicine. Pt appears high medical risk of complications post transplant and we reviewed this today with pt and mother and they understand. He is currently inactive on our list. Will review with committee regarding moving forward with active listing but need to review with AOT as they also saw pt recently.      Recent CT scan April 23 with significant mastoid effusion-I have asked our office to reach out to PAC who saw pt at ENT to learn f/u plans regarding CT findings.     History of severe sleep apnea-pt does not tolerate CPAP     History of pulmonary nodules     History of stroke and lacunar infarct. 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  Patient followed with hematology and neurology prior.  On plavix and aspirin  -Recommendation-high risk of VTE in the perioperative period and DVT proph in perioperative period prior.  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    It was a pleasure evaluating your patient in the office today. Thank you for allowing our team to participate in the care of Mr Mateus Mckeon. Please do not hesitate to contact our team if further issues/questions shall arise in the interim.     No problem-specific Assessment & Plan notes found for this encounter.      I have spent a total time of 50 minutes on 05/30/24 in caring for this patient including Diagnostic results, Prognosis, Risks and benefits of tx options, Instructions for management, Patient and family education, Risk factor reductions, Impressions, Counseling / Coordination of care, Documenting in the medical record, Reviewing / ordering tests, medicine, procedures  , and Obtaining or reviewing history  .    HPI:    Patient denies new complaints.  No fevers, chills, nausea, vomiting.  Tolerating dialysis.    PATIENT INSTRUCTIONS:    Patient Instructions   1) We will review your case at the transplant committee meeting and will review our decision with you in approximately 4 weeks over the phone.  2) If you do not receive a call from [facility] within 4 weeks, please call our local Nephrology office.  3) From a renal transplant evaluation purpose, we will see you once a year in our local office for medical  "follow-up.  4) Once we call you with our decision regarding further evaluation, you will receive further instruction over the phone and in the mail regarding the next steps to complete the transplant evaluation.  5) All of your non transplant evaluation follow up regarding your regular medical follow ups, your renal care follow ups, and any other specialists visits you are following with should continue as you are advised by those respective physicians and continue to follow with their management and care.  And if there is any emergency please go to the nearest urgent care or emergency room.  6) please continue follow up with your ENT team as you have upcoming appt  7) please see the sleep medicine team to reestablish care for your sleep apnea        OBJECTIVE:  Current Weight: Weight - Scale: 89.4 kg (197 lb)  Vitals:    05/30/24 1519   BP: 142/74   BP Location: Right arm   Patient Position: Sitting   Cuff Size: Standard   Weight: 89.4 kg (197 lb)   Height: 5' 5\" (1.651 m)    Body mass index is 32.78 kg/m².      REVIEW OF SYSTEMS:    Review of Systems   All other systems reviewed and are negative.      PHYSICAL EXAM:      Physical Exam  Vitals and nursing note reviewed.   Constitutional:       General: He is not in acute distress.     Appearance: He is well-developed. He is not diaphoretic.   HENT:      Head: Normocephalic and atraumatic.   Eyes:      General: No scleral icterus.        Right eye: No discharge.         Left eye: No discharge.      Conjunctiva/sclera: Conjunctivae normal.   Cardiovascular:      Rate and Rhythm: Normal rate and regular rhythm.      Heart sounds: Normal heart sounds. No murmur heard.     No friction rub. No gallop.   Pulmonary:      Effort: Pulmonary effort is normal. No respiratory distress.      Breath sounds: Normal breath sounds. No wheezing or rales.   Chest:      Chest wall: No tenderness.   Abdominal:      General: Bowel sounds are normal. There is no distension.      " Palpations: Abdomen is soft.      Tenderness: There is no abdominal tenderness. There is no rebound.   Musculoskeletal:         General: No tenderness or deformity. Normal range of motion.      Cervical back: Normal range of motion and neck supple.      Right lower leg: No edema.      Left lower leg: No edema.      Comments: Right upper extremity weakness  When compared to left upper extremity weakness.  But is able to move arm, squeeze fingers, extend arm.   Skin:     General: Skin is warm and dry.      Coloration: Skin is not pale.      Findings: No erythema or rash.   Neurological:      Mental Status: He is alert and oriented to person, place, and time.      Cranial Nerves: No cranial nerve deficit.      Comments: Uses a walker to ambulate.  But is able to walk without a walker to the exam table.   Psychiatric:         Behavior: Behavior normal.         Thought Content: Thought content normal.         Judgment: Judgment normal.         Medications:    Current Outpatient Medications:   •  aspirin (ECOTRIN LOW STRENGTH) 81 mg EC tablet, Take 81 mg by mouth daily, Disp: , Rfl:   •  atorvastatin (LIPITOR) 40 mg tablet, Take 1 tablet (40 mg total) by mouth every evening, Disp: 90 tablet, Rfl: 3  •  b complex vitamins capsule, Take 1 capsule by mouth daily before lunch , Disp: , Rfl:   •  calcium acetate (PHOSLO) capsule, Take 1 capsule (667 mg total) by mouth 3 (three) times a day, Disp: 90 capsule, Rfl: 0  •  cinacalcet (SENSIPAR) 60 MG tablet, Take 1 tablet (60 mg total) by mouth daily, Disp: 30 tablet, Rfl: 0  •  escitalopram (LEXAPRO) 20 mg tablet, Take 1 tablet (20 mg total) by mouth daily, Disp: 90 tablet, Rfl: 2  •  famotidine (PEPCID) 40 MG tablet, TAKE 1 TABLET BY MOUTH IN THE MORNING, Disp: 90 tablet, Rfl: 0  •  gabapentin (NEURONTIN) 100 mg capsule, TAKE1 CAPSULES BY MOUTH AT BEDTIME, Disp: 90 capsule, Rfl: 3  •  GLUCAGON EMERGENCY 1 MG injection, , Disp: , Rfl: 3  •  Gvoke HypoPen 1-Pack 1 MG/0.2ML SOAJ, ,  Disp: , Rfl:   •  hydrALAZINE (APRESOLINE) 100 MG tablet, Take 1 tablet (100 mg total) by mouth 3 (three) times a day, Disp: 90 tablet, Rfl: 0  •  hydrOXYzine HCL (ATARAX) 10 mg tablet, Take 1 tablet (10 mg total) by mouth every 6 (six) hours as needed for itching or anxiety, Disp: 30 tablet, Rfl: 3  •  Insulin Disposable Pump (Omnipod 5 G6 Intro, Gen 5,) KIT, , Disp: , Rfl:   •  Insulin Disposable Pump (Omnipod 5 G6 Pod, Gen 5,) MISC, , Disp: , Rfl:   •  labetalol (NORMODYNE) 200 mg tablet, Take 2 tablets (400 mg total) by mouth 3 (three) times a day, Disp: 180 tablet, Rfl: 0  •  lisinopril (ZESTRIL) 40 mg tablet, Take 1 tablet (40 mg total) by mouth daily, Disp: 30 tablet, Rfl: 0  •  metoclopramide (REGLAN) 5 mg tablet, Take 1 tablet by mouth three times daily as needed, Disp: 90 tablet, Rfl: 0  •  minoxidil (LONITEN) 2.5 mg tablet, Take 2 tablets (5 mg total) by mouth 2 (two) times a day (Patient taking differently: Take 5 mg by mouth daily), Disp: 120 tablet, Rfl: 0  •  NIFEdipine (PROCARDIA XL) 30 mg 24 hr tablet, Take 2 tablets (60 mg total) by mouth 2 (two) times a day, Disp: 120 tablet, Rfl: 0  •  NovoLOG 100 UNIT/ML injection, , Disp: , Rfl:   •  ondansetron (ZOFRAN) 4 mg tablet, Take 1 tablet (4 mg total) by mouth every 8 (eight) hours as needed for nausea or vomiting, Disp: 90 tablet, Rfl: 0  •  Patiromer Sorbitex Calcium (VELTASSA PO), Take 5 g by mouth once a week, Disp: , Rfl:   •  saccharomyces boulardii (FLORASTOR) 250 mg capsule, Take 250 mg by mouth daily, Disp: , Rfl:   •  SPS 15 GM/60ML suspension, TAKE 60 ML BY MOUTH SINGLE DOSE FOR EMERGENCY USE DURING MISSED HEMODIALYSIS, Disp: , Rfl:   •  traZODone (DESYREL) 50 mg tablet, TAKE 1/2 (ONE-HALF) TABLET BY MOUTH ONCE DAILY AT BEDTIME AS NEEDED FOR SLEEP, Disp: 30 tablet, Rfl: 2  •  cloNIDine (CATAPRES-TTS-3) 0.3 mg/24 hr, 1 patch once a week (Patient not taking: Reported on 5/17/2024), Disp: , Rfl:     Laboratory Results:        Invalid input(s):  "\"ALBUMIN\"    Results for orders placed or performed during the hospital encounter of 02/03/24   MRSA culture    Specimen: Nose; Nares   Result Value Ref Range    MRSA Culture Only       No Methicillin Resistant Staphlyococcus aureus (MRSA) isolated   CBC and differential   Result Value Ref Range    WBC 5.51 4.31 - 10.16 Thousand/uL    RBC 2.59 (L) 3.88 - 5.62 Million/uL    Hemoglobin 8.2 (L) 12.0 - 17.0 g/dL    Hematocrit 25.7 (L) 36.5 - 49.3 %    MCV 99 (H) 82 - 98 fL    MCH 31.7 26.8 - 34.3 pg    MCHC 31.9 31.4 - 37.4 g/dL    RDW 16.0 (H) 11.6 - 15.1 %    MPV 10.7 8.9 - 12.7 fL    Platelets 268 149 - 390 Thousands/uL    nRBC 0 /100 WBCs    Segmented % 54 43 - 75 %    Immature Grans % 1 0 - 2 %    Lymphocytes % 27 14 - 44 %    Monocytes % 9 4 - 12 %    Eosinophils Relative 8 (H) 0 - 6 %    Basophils Relative 1 0 - 1 %    Absolute Neutrophils 3.00 1.85 - 7.62 Thousands/µL    Absolute Immature Grans 0.04 0.00 - 0.20 Thousand/uL    Absolute Lymphocytes 1.48 0.60 - 4.47 Thousands/µL    Absolute Monocytes 0.47 0.17 - 1.22 Thousand/µL    Eosinophils Absolute 0.45 0.00 - 0.61 Thousand/µL    Basophils Absolute 0.07 0.00 - 0.10 Thousands/µL   Comprehensive metabolic panel   Result Value Ref Range    Sodium 143 135 - 147 mmol/L    Potassium 4.3 3.5 - 5.3 mmol/L    Chloride 100 96 - 108 mmol/L    CO2 33 (H) 21 - 32 mmol/L    ANION GAP 10 mmol/L    BUN 19 5 - 25 mg/dL    Creatinine 8.36 (H) 0.60 - 1.30 mg/dL    Glucose 176 (H) 65 - 140 mg/dL    Calcium 8.7 8.4 - 10.2 mg/dL    AST 15 13 - 39 U/L    ALT 12 7 - 52 U/L    Alkaline Phosphatase 87 34 - 104 U/L    Total Protein 6.5 6.4 - 8.4 g/dL    Albumin 4.2 3.5 - 5.0 g/dL    Total Bilirubin 0.63 0.20 - 1.00 mg/dL    eGFR 7 ml/min/1.73sq m   Basic metabolic panel   Result Value Ref Range    Sodium 141 135 - 147 mmol/L    Potassium 4.0 3.5 - 5.3 mmol/L    Chloride 101 96 - 108 mmol/L    CO2 30 21 - 32 mmol/L    ANION GAP 10 mmol/L    BUN 20 5 - 25 mg/dL    Creatinine 8.56 (H) 0.60 " - 1.30 mg/dL    Glucose 151 (H) 65 - 140 mg/dL    Calcium 8.3 (L) 8.4 - 10.2 mg/dL    eGFR 6 ml/min/1.73sq m   Magnesium   Result Value Ref Range    Magnesium 2.1 1.9 - 2.7 mg/dL   Phosphorus   Result Value Ref Range    Phosphorus 3.3 2.7 - 4.5 mg/dL   CBC and Platelet   Result Value Ref Range    WBC 5.49 4.31 - 10.16 Thousand/uL    RBC 2.51 (L) 3.88 - 5.62 Million/uL    Hemoglobin 7.9 (L) 12.0 - 17.0 g/dL    Hematocrit 25.0 (L) 36.5 - 49.3 %     (H) 82 - 98 fL    MCH 31.5 26.8 - 34.3 pg    MCHC 31.6 31.4 - 37.4 g/dL    RDW 16.5 (H) 11.6 - 15.1 %    Platelets 239 149 - 390 Thousands/uL    MPV 10.4 8.9 - 12.7 fL   CBC and differential   Result Value Ref Range    WBC 4.65 4.31 - 10.16 Thousand/uL    RBC 2.36 (L) 3.88 - 5.62 Million/uL    Hemoglobin 7.4 (L) 12.0 - 17.0 g/dL    Hematocrit 23.6 (L) 36.5 - 49.3 %     (H) 82 - 98 fL    MCH 31.4 26.8 - 34.3 pg    MCHC 31.4 31.4 - 37.4 g/dL    RDW 16.9 (H) 11.6 - 15.1 %    MPV 9.9 8.9 - 12.7 fL    Platelets 238 149 - 390 Thousands/uL    nRBC 0 /100 WBCs    Segmented % 55 43 - 75 %    Immature Grans % 1 0 - 2 %    Lymphocytes % 26 14 - 44 %    Monocytes % 9 4 - 12 %    Eosinophils Relative 8 (H) 0 - 6 %    Basophils Relative 1 0 - 1 %    Absolute Neutrophils 2.57 1.85 - 7.62 Thousands/µL    Absolute Immature Grans 0.03 0.00 - 0.20 Thousand/uL    Absolute Lymphocytes 1.22 0.60 - 4.47 Thousands/µL    Absolute Monocytes 0.41 0.17 - 1.22 Thousand/µL    Eosinophils Absolute 0.36 0.00 - 0.61 Thousand/µL    Basophils Absolute 0.06 0.00 - 0.10 Thousands/µL   Basic metabolic panel   Result Value Ref Range    Sodium 141 135 - 147 mmol/L    Potassium 4.2 3.5 - 5.3 mmol/L    Chloride 101 96 - 108 mmol/L    CO2 30 21 - 32 mmol/L    ANION GAP 10 mmol/L    BUN 30 (H) 5 - 25 mg/dL    Creatinine 10.88 (H) 0.60 - 1.30 mg/dL    Glucose 191 (H) 65 - 140 mg/dL    Calcium 8.0 (L) 8.4 - 10.2 mg/dL    eGFR 5 ml/min/1.73sq m   Magnesium   Result Value Ref Range    Magnesium 2.2 1.9 -  2.7 mg/dL   Phosphorus   Result Value Ref Range    Phosphorus 4.3 2.7 - 4.5 mg/dL   Metanephrine, Fractionated Plasma Free   Result Value Ref Range    Normetanephrine, Free 150.3 0.0 - 210.1 pg/mL    Metanephrine, Free <25.0 0.0 - 88.0 pg/mL   CBC and differential   Result Value Ref Range    WBC 5.05 4.31 - 10.16 Thousand/uL    RBC 2.51 (L) 3.88 - 5.62 Million/uL    Hemoglobin 7.9 (L) 12.0 - 17.0 g/dL    Hematocrit 25.0 (L) 36.5 - 49.3 %     (H) 82 - 98 fL    MCH 31.5 26.8 - 34.3 pg    MCHC 31.6 31.4 - 37.4 g/dL    RDW 17.5 (H) 11.6 - 15.1 %    MPV 9.5 8.9 - 12.7 fL    Platelets 231 149 - 390 Thousands/uL    nRBC 0 /100 WBCs    Segmented % 58 43 - 75 %    Immature Grans % 0 0 - 2 %    Lymphocytes % 24 14 - 44 %    Monocytes % 11 4 - 12 %    Eosinophils Relative 6 0 - 6 %    Basophils Relative 1 0 - 1 %    Absolute Neutrophils 2.91 1.85 - 7.62 Thousands/µL    Absolute Immature Grans 0.02 0.00 - 0.20 Thousand/uL    Absolute Lymphocytes 1.22 0.60 - 4.47 Thousands/µL    Absolute Monocytes 0.56 0.17 - 1.22 Thousand/µL    Eosinophils Absolute 0.28 0.00 - 0.61 Thousand/µL    Basophils Absolute 0.06 0.00 - 0.10 Thousands/µL   Comprehensive metabolic panel   Result Value Ref Range    Sodium 143 135 - 147 mmol/L    Potassium 4.3 3.5 - 5.3 mmol/L    Chloride 102 96 - 108 mmol/L    CO2 32 21 - 32 mmol/L    ANION GAP 9 mmol/L    BUN 22 5 - 25 mg/dL    Creatinine 7.25 (H) 0.60 - 1.30 mg/dL    Glucose 146 (H) 65 - 140 mg/dL    Calcium 8.2 (L) 8.4 - 10.2 mg/dL    AST 13 13 - 39 U/L    ALT 8 7 - 52 U/L    Alkaline Phosphatase 74 34 - 104 U/L    Total Protein 5.6 (L) 6.4 - 8.4 g/dL    Albumin 3.6 3.5 - 5.0 g/dL    Total Bilirubin 0.53 0.20 - 1.00 mg/dL    eGFR 8 ml/min/1.73sq m   Magnesium   Result Value Ref Range    Magnesium 2.1 1.9 - 2.7 mg/dL   Phosphorus   Result Value Ref Range    Phosphorus 3.7 2.7 - 4.5 mg/dL   CBC   Result Value Ref Range    WBC 4.42 4.31 - 10.16 Thousand/uL    RBC 2.36 (L) 3.88 - 5.62 Million/uL     Hemoglobin 7.5 (L) 12.0 - 17.0 g/dL    Hematocrit 23.8 (L) 36.5 - 49.3 %     (H) 82 - 98 fL    MCH 31.8 26.8 - 34.3 pg    MCHC 31.5 31.4 - 37.4 g/dL    RDW 18.3 (H) 11.6 - 15.1 %    Platelets 201 149 - 390 Thousands/uL    MPV 9.4 8.9 - 12.7 fL   Basic metabolic panel   Result Value Ref Range    Sodium 140 135 - 147 mmol/L    Potassium 4.2 3.5 - 5.3 mmol/L    Chloride 101 96 - 108 mmol/L    CO2 29 21 - 32 mmol/L    ANION GAP 10 mmol/L    BUN 34 (H) 5 - 25 mg/dL    Creatinine 9.00 (H) 0.60 - 1.30 mg/dL    Glucose 106 65 - 140 mg/dL    Calcium 8.2 (L) 8.4 - 10.2 mg/dL    eGFR 6 ml/min/1.73sq m   Magnesium   Result Value Ref Range    Magnesium 2.1 1.9 - 2.7 mg/dL   Phosphorus   Result Value Ref Range    Phosphorus 5.1 (H) 2.7 - 4.5 mg/dL   TIBC Panel (incl. Iron, TIBC, % Iron Saturation)   Result Value Ref Range    Iron Saturation 55 (H) 15 - 50 %    TIBC 162 (L) 250 - 450 ug/dL    Iron 89 50 - 212 ug/dL    UIBC 73 (L) 155 - 355 ug/dL   Ferritin   Result Value Ref Range    Ferritin 495 (H) 24 - 336 ng/mL   Basic metabolic panel   Result Value Ref Range    Sodium 137 135 - 147 mmol/L    Potassium 4.2 3.5 - 5.3 mmol/L    Chloride 99 96 - 108 mmol/L    CO2 30 21 - 32 mmol/L    ANION GAP 8 mmol/L    BUN 22 5 - 25 mg/dL    Creatinine 6.10 (H) 0.60 - 1.30 mg/dL    Glucose 221 (H) 65 - 140 mg/dL    Calcium 8.3 (L) 8.4 - 10.2 mg/dL    eGFR 10 ml/min/1.73sq m   Calcium, ionized   Result Value Ref Range    Calcium, Ionized 1.06 (L) 1.12 - 1.32 mmol/L   CBC   Result Value Ref Range    WBC 4.93 4.31 - 10.16 Thousand/uL    RBC 2.53 (L) 3.88 - 5.62 Million/uL    Hemoglobin 8.2 (L) 12.0 - 17.0 g/dL    Hematocrit 25.4 (L) 36.5 - 49.3 %     (H) 82 - 98 fL    MCH 32.4 26.8 - 34.3 pg    MCHC 32.3 31.4 - 37.4 g/dL    RDW 18.4 (H) 11.6 - 15.1 %    Platelets 232 149 - 390 Thousands/uL    MPV 9.9 8.9 - 12.7 fL   Phosphorus   Result Value Ref Range    Phosphorus 4.8 (H) 2.7 - 4.5 mg/dL   Magnesium   Result Value Ref Range     Magnesium 2.1 1.9 - 2.7 mg/dL   ECG 12 lead   Result Value Ref Range    Ventricular Rate 74 BPM    Atrial Rate 74 BPM    VA Interval 172 ms    QRSD Interval 74 ms    QT Interval 442 ms    QTC Interval 490 ms    P Axis 60 degrees    QRS Axis 54 degrees    T Wave Axis 5 degrees   Fingerstick Glucose (POCT)   Result Value Ref Range    POC Glucose 124 65 - 140 mg/dl   Fingerstick Glucose (POCT)   Result Value Ref Range    POC Glucose 139 65 - 140 mg/dl   Fingerstick Glucose (POCT)   Result Value Ref Range    POC Glucose 222 (H) 65 - 140 mg/dl   Fingerstick Glucose (POCT)   Result Value Ref Range    POC Glucose 131 65 - 140 mg/dl   Fingerstick Glucose (POCT)   Result Value Ref Range    POC Glucose 190 (H) 65 - 140 mg/dl   Fingerstick Glucose (POCT)   Result Value Ref Range    POC Glucose 171 (H) 65 - 140 mg/dl   Fingerstick Glucose (POCT)   Result Value Ref Range    POC Glucose 152 (H) 65 - 140 mg/dl   Fingerstick Glucose (POCT)   Result Value Ref Range    POC Glucose 194 (H) 65 - 140 mg/dl     *Note: Due to a large number of results and/or encounters for the requested time period, some results have not been displayed. A complete set of results can be found in Results Review.

## 2024-05-30 NOTE — PROGRESS NOTES
Daily Note     Today's date: 2024  Patient name: Mateus Mckeon  : 1985  MRN: [id number]  Referring provider: Dania Sher DO  Dx:   Encounter Diagnosis     ICD-10-CM    1. Cerebrovascular accident (CVA) due to other mechanism (HCC)  I63.89           Start Time: 0845  Stop Time: 0930  Total time in clinic (min): 45 minutes      PLAN OF CARE START: 24  PLAN OF CARE END: 2024  FREQUENCY: 2 times a week  PRECAUTIONS: Renal failure, actively going through dialysis; type 1 diabetes; HTN; SAH; seizure (last one was 2019)      Subjective: Pt states he wants to work more on his hand today    Objective: See treatment diary below  ON FLUID RESTRICTIONS--DO NOT OFFER WATER    MHP x5 min on R hand prior to manual therapy on the R hand.    NMR:   -Performed reaching exercise with 2 lb wrist weight donned to the R UE. Pt in standing, retrieve tennis ball from table on the pt's R side, then place onto target onto table placed anteriorly. Performed 2x20 repetitions. Focus of exercise on UE strenth, shoulder rotation, grasp/release.   -Performed exercise with pt pulling XL squigz off the wall at ~shoulder height then transfer into container placed to the patient's R side. Folloing D1 PNF pattern with grasp and release of the squigz. 2 lb wrist weight donned to the R UE. Working on scapular retraction, dynamic standing balance, UE strength/endurance.  -Completed 1 Qbitz puzzle with pt using R UE to manipulate the pieces. Focus of activity on R UE FMC, dexterity,  skills.     TE:  Manual therapy at the R hand including PROM and joint mobilization at the wrist/digits to improve ROM required for grasp. Mild pain reported at the PIPs/DIPs with PROM, which resolved following PROM. Pt was educated on technique to perform at home.     Assessment: Pt tolerated activities fairly. Continues to fatigue quickly in session, however tolerated the NuStep better today.  Pt would benefit from continued skilled  occupational therapy services to improve fine motor coordination, dexterity, stregnth, UE function, and functional cognition.    Plan: Continue per plan of care.     SHORT TERM GOALS ADDED 2/20/24:  Pt will increase sustained attention by completing trail making part A in 35 seconds to complete IADLs NOT MET  Pt will increase divided attention by completing trail making part B in 1 minute 30 seconds to complete IADLs NOT MET  Pt will increase delayed recall and recall 4 /5 items on MOCA to complete IADLs GOAL MET    LONG TERM GOALS ADDED 2/20/24:  Pt will increase sustained attention by completing trail making part A in 38 seconds to complete IADLs  Pt will increase divided attention by completing trail making part B in 1 minute 10 seconds to complete IADLs  Pt will increase delayed recall and recall 5/5 items on MOCA to complete IADLs  Resume right  hand dominance to refined functional assist with all activities of daily living

## 2024-05-30 NOTE — PROGRESS NOTES
Daily Note     Today's date: 2024  Patient name: Mateus Mckeon  : 1985  MRN: [id number]  Referring provider: Dania Sher DO  Dx:   Encounter Diagnosis     ICD-10-CM    1. Acute CVA (cerebrovascular accident) (HCC)  I63.9                       POC expires Auth Status Total   Visits  Start date  Expiration date PT/OT + Visit Limit? Co-Insurance   24     bomn                                                         Subjective: Patient with no new complaints.       Objective: See treatment diary below  *FLUID RESTRICTION- do not offer water*       NMR (3# ankle weights)  - Fwd/bwd walking 10x  - Agility ladder fwd in/out x 4 laps   - Agility ladder lateral in/out x 4 laps   - Walking facility dog outside on sidewalks, grass, ramp (10 min total)  - Standing on foam pad stacking rings on hook in facility dog's mouth  - Step ups to foam pad 10x, 2 sets       Assessment: Patient tolerated treatment session well today with use of gait belt instead of Solo Step. Requires CG for majority of session due to posterior LOB. Utilized facility dog today for pt salience, as he reports loving dogs. Will continue to focus on foam based activities to improve balance and righting reactions. Patient would benefit from continued PT to improve balance, endurance, and reduce fall risk.       Plan: Continue per plan of care.  Gait belt.         Outcome Measures Initial Eval  24 Re-eval  24 PN  2024 RE  2024     5xSTS 31.25 sec 52.20 sec no UE's    (1 LOB) 19.91 sec no UE 20.14 sec  No UE      TUG 16.3 sec with rollator    23.6 sec no AD 20.68 sec with rollator    18.44 sec no AD 23.41 sec with rollator    12.92 sec no AD 19.69 sec with rollator    12.45 sec no AD      10 meter  Next visit> 0.88 m/s 1.08 m/s     MEDRANO  Next visit> 44/56 48/56     FGA  Next visit>      6MWT 900 ft 710 ft  Rollator  760 ft no  ft  No AD      mCTSIB  FTEO firm  FTEC firm  FTEO foam  FTEC foam    Next visit> 30 sec  30 sec  5.5 sec  defer 30 sec  30 sec  30 sec  1 sec     ABC  65% 43.75% 56.88%

## 2024-05-31 ENCOUNTER — TELEPHONE (OUTPATIENT)
Dept: NEPHROLOGY | Facility: CLINIC | Age: 39
End: 2024-05-31

## 2024-05-31 DIAGNOSIS — K21.9 GASTROESOPHAGEAL REFLUX DISEASE WITHOUT ESOPHAGITIS: ICD-10-CM

## 2024-05-31 RX ORDER — FAMOTIDINE 40 MG/1
40 TABLET, FILM COATED ORAL EVERY MORNING
Qty: 90 TABLET | Refills: 1 | Status: SHIPPED | OUTPATIENT
Start: 2024-05-31

## 2024-05-31 NOTE — TELEPHONE ENCOUNTER
----- Message from Maya Riley MD sent at 5/30/2024  3:40 PM EDT -----  Hello    Hope you are well    Can we call Lesa Hardy PA-C ENT at [facility]. They saw pt for mastoid effusion. Pt had CT scan completed. We need their final recs for clearance from renal transplant perspective. Any further plans for mastoid effusion    Thank you    np

## 2024-05-31 NOTE — TELEPHONE ENCOUNTER
Called ENT @ [phone].  Message will be sent to provider to dictate a note and fax to our office.

## 2024-06-03 ENCOUNTER — APPOINTMENT (OUTPATIENT)
Facility: CLINIC | Age: 39
End: 2024-06-03
Payer: MEDICARE

## 2024-06-04 ENCOUNTER — APPOINTMENT (OUTPATIENT)
Facility: CLINIC | Age: 39
End: 2024-06-04
Payer: MEDICARE

## 2024-06-06 ENCOUNTER — APPOINTMENT (OUTPATIENT)
Facility: CLINIC | Age: 39
End: 2024-06-06
Payer: MEDICARE

## 2024-06-10 ENCOUNTER — APPOINTMENT (OUTPATIENT)
Facility: CLINIC | Age: 39
End: 2024-06-10
Payer: MEDICARE

## 2024-06-10 ENCOUNTER — OFFICE VISIT (OUTPATIENT)
Facility: CLINIC | Age: 39
End: 2024-06-10
Payer: MEDICARE

## 2024-06-10 DIAGNOSIS — I63.89 CEREBROVASCULAR ACCIDENT (CVA) DUE TO OTHER MECHANISM (HCC): Primary | ICD-10-CM

## 2024-06-10 PROCEDURE — 97112 NEUROMUSCULAR REEDUCATION: CPT

## 2024-06-10 PROCEDURE — 97530 THERAPEUTIC ACTIVITIES: CPT

## 2024-06-10 NOTE — PROGRESS NOTES
Daily Note     Today's date: 6/10/2024  Patient name: Mateus Mckeon  : 1985  MRN: [id number]  Referring provider: Dania Sher DO  Dx:   Encounter Diagnosis     ICD-10-CM    1. Cerebrovascular accident (CVA) due to other mechanism (HCC)  I63.89           Start Time: 0802  Stop Time: 0845  Total time in clinic (min): 43 minutes      PLAN OF CARE START: 24  PLAN OF CARE END: 2024  FREQUENCY: 2 times a week  PRECAUTIONS: Renal failure, actively going through dialysis; type 1 diabetes; HTN; SAH; seizure (last one was 2019)  ON FLUID RESTRICTIONS--DO NOT OFFER WATER    Subjective: “I fell since I was here last.” Pt. reported he fell trying to put on his boxer shorts.    Objective: See treatment diary below  ON FLUID RESTRICTIONS--DO NOT OFFER WATER    NMR:  -nu-step using RUE to propel forward, completed for 6 minutes, level 4 cues to initiate movement with L vs. R  3x10 standing push ups with rest between each set to improve UE strength, ROM, and proprioception in R UE.  -in stance, reaching from low surface to obtain rings to place onto handle while wearing 2 lb weight on RUE to improve ROM, strength, pincer grasp, and functional reaching. Able to complete dynamic standing/reaching for duration of this activity in PNF D2 patterns  TA:   -using tan Theraputty with doorknob and pincer grasp tools for 7-8 minutes to improve hand/finger strength, wrist ROM, pronation and deviation. Due to increased fatigue, completed seated.    Assessment: Pt tolerated activities fairly. Pt required cues to push/utilize R arm for more of the movement. Fatigued noted throughout session, requiring increased breaks between activites..  Pt would benefit from continued skilled occupational therapy services to improve fine motor coordination, dexterity, stregnth, UE function, and functional cognition.    Plan: Continue per plan of care.     SHORT TERM GOALS ADDED 24:  Pt will increase sustained attention by  completing trail making part A in 35 seconds to complete IADLs NOT MET  Pt will increase divided attention by completing trail making part B in 1 minute 30 seconds to complete IADLs NOT MET  Pt will increase delayed recall and recall 4 /5 items on MOCA to complete IADLs GOAL MET    LONG TERM GOALS ADDED 2/20/24:  Pt will increase sustained attention by completing trail making part A in 38 seconds to complete IADLs  Pt will increase divided attention by completing trail making part B in 1 minute 10 seconds to complete IADLs  Pt will increase delayed recall and recall 5/5 items on MOCA to complete IADLs  Resume right  hand dominance to refined functional assist with all activities of daily living

## 2024-06-13 ENCOUNTER — OFFICE VISIT (OUTPATIENT)
Facility: CLINIC | Age: 39
End: 2024-06-13
Payer: MEDICARE

## 2024-06-13 DIAGNOSIS — I63.9 ACUTE CVA (CEREBROVASCULAR ACCIDENT) (HCC): Primary | ICD-10-CM

## 2024-06-13 DIAGNOSIS — I63.89 CEREBROVASCULAR ACCIDENT (CVA) DUE TO OTHER MECHANISM (HCC): Primary | ICD-10-CM

## 2024-06-13 PROCEDURE — 97112 NEUROMUSCULAR REEDUCATION: CPT

## 2024-06-13 PROCEDURE — 97112 NEUROMUSCULAR REEDUCATION: CPT | Performed by: PHYSICAL THERAPIST

## 2024-06-13 NOTE — PROGRESS NOTES
Daily Note     Today's date: 2024  Patient name: Mateus Mckeon  : 1985  MRN: [id number]  Referring provider: Dania Sher DO  Dx:   Encounter Diagnosis     ICD-10-CM    1. Acute CVA (cerebrovascular accident) (HCC)  I63.9                         POC expires Auth Status Total   Visits  Start date  Expiration date PT/OT + Visit Limit? Co-Insurance   24     bomn                                                  Session performed by Moni LAGUNAS, under the direct supervision of Marge Gilbert PT, DPT.       Subjective: Patient with no new complaints, stating he is feeling a little sluggish today.      Objective: See treatment diary below  *FLUID RESTRICTION- do not offer water*       NMR (3# ankle weights)  - Fwd/bwd walking 10x  - Agility ladder fwd in/out x 4 laps   - Agility ladder diagonals in/out x 2 laps   - Agility ladder lateral in/out x 3 laps  - Standing on foam pad with ring toss to 3 cones  - Step ups to foam pad 12x, 2 sets       Assessment: Patient tolerated treatment session well today with continued use of gait belt instead of Solo Step. Pt continues to require CG throughout session due to multidirectional LOB. With diagonals in agility ladder, pt demonstrated increased lateral postural sway, but able to utilize hip strategy to correct. With ring toss, pt demonstrated increased forward sway when attempting to throw the ring, but no LOB on the foam pad. Will continue to focus on foam based activities to improve balance and righting reactions. Patient would benefit from continued PT to improve balance, endurance, and reduce fall risk.       Plan: Continue per plan of care.  Gait belt.         Outcome Measures Initial Eval  24 Re-eval  24 PN  2024 RE  2024     5xSTS 31.25 sec 52.20 sec no UE's    (1 LOB) 19.91 sec no UE 20.14 sec  No UE      TUG 16.3 sec with rollator    23.6 sec no AD 20.68 sec with rollator    18.44 sec no AD 23.41 sec with  rollator    12.92 sec no AD 19.69 sec with rollator    12.45 sec no AD      10 meter  Next visit> 0.88 m/s 1.08 m/s     MEDRANO  Next visit> 44/56 48/56     FGA 12/30 Next visit> 12/30 12/30     6MWT 900 ft 710 ft  Rollator  760 ft no  ft  No AD      mCTSIB  FTEO firm  FTEC firm  FTEO foam  FTEC foam   Next visit> 30 sec  30 sec  5.5 sec  defer 30 sec  30 sec  30 sec  1 sec     ABC  65% 43.75% 56.88%

## 2024-06-13 NOTE — PROGRESS NOTES
"Daily Note     Today's date: 2024  Patient name: Mateus Mckeon  : 1985  MRN: [id number]  Referring provider: Dania Sher DO  Dx:   Encounter Diagnosis     ICD-10-CM    1. Cerebrovascular accident (CVA) due to other mechanism (HCC)  I63.89         Start Time: 0845  Stop Time: 0930  Total time in clinic (min): 45 minutes    PLAN OF CARE START: 24  PLAN OF CARE END: 2024  FREQUENCY: 2 times a week  PRECAUTIONS: Renal failure, actively going through dialysis; type 1 diabetes; HTN; SAH; seizure (last one was 2019)  ON FLUID RESTRICTIONS--DO NOT OFFER WATER    Subjective: \"I am feeling tired today\"    Objective: See treatment diary below  ON FLUID RESTRICTIONS--DO NOT OFFER WATER    NMR:  -nu-step level 3 for  7 minutes, fatiguing and needing rest at 5.30 min  , using B/L to propel forward, completed for 6 minutes to improve endurance and UE ROM/Strength  -qbuitz with 2 lb weight on RUE to increase proprioception, dexterity, and FMC.   -in stance, reaching with RUE to retrieve and XL squigz from wall with 2 lb weight on RUE to improve ROM,strength, grasp, standing balance and functional reaching. Able to complete dynamic standing/reaching for duration of this activity in PNF D2 patterns. 3 sets.   -Reaching with RUE, 2lb weight for increased proprioception while engaged in column block activity to improve UE ROM/strength, strength and FMC and grasp.     Assessment: Pt tolerated activities fairly. Pt required cues to push/utilize R arm for more of the movement. Fatigued noted throughout session, requiring increased breaks between activites.  Pt would benefit from continued skilled occupational therapy services to improve fine motor coordination, dexterity, stregnth, UE function, and functional cognition.    Plan: Continue per plan of care.     SHORT TERM GOALS ADDED 24:  Pt will increase sustained attention by completing trail making part A in 35 seconds to complete IADLs NOT " MET  Pt will increase divided attention by completing trail making part B in 1 minute 30 seconds to complete IADLs NOT MET  Pt will increase delayed recall and recall 4 /5 items on MOCA to complete IADLs GOAL MET    LONG TERM GOALS ADDED 2/20/24:  Pt will increase sustained attention by completing trail making part A in 38 seconds to complete IADLs  Pt will increase divided attention by completing trail making part B in 1 minute 10 seconds to complete IADLs  Pt will increase delayed recall and recall 5/5 items on MOCA to complete IADLs  Resume right  hand dominance to refined functional assist with all activities of daily living

## 2024-06-14 ENCOUNTER — TRANSCRIBE ORDERS (OUTPATIENT)
Dept: GASTROENTEROLOGY | Facility: CLINIC | Age: 39
End: 2024-06-14

## 2024-06-17 ENCOUNTER — OFFICE VISIT (OUTPATIENT)
Facility: CLINIC | Age: 39
End: 2024-06-17
Payer: MEDICARE

## 2024-06-17 ENCOUNTER — EVALUATION (OUTPATIENT)
Facility: CLINIC | Age: 39
End: 2024-06-17
Payer: MEDICARE

## 2024-06-17 DIAGNOSIS — I63.89 CEREBROVASCULAR ACCIDENT (CVA) DUE TO OTHER MECHANISM (HCC): Primary | ICD-10-CM

## 2024-06-17 DIAGNOSIS — I63.9 ACUTE CVA (CEREBROVASCULAR ACCIDENT) (HCC): Primary | ICD-10-CM

## 2024-06-17 PROCEDURE — 97112 NEUROMUSCULAR REEDUCATION: CPT

## 2024-06-17 PROCEDURE — 97530 THERAPEUTIC ACTIVITIES: CPT

## 2024-06-17 NOTE — PROGRESS NOTES
PT Re-Evaluation          POC expires Auth Status Total   Visits  Start date  Expiration date PT/OT + Visit Limit? Co-Insurance   24                                                 Today's date: 2024  Patient name: Mateus Mckeon  : 1985  MRN: [id number]  Referring provider: Dania Sher DO  Dx:   Encounter Diagnosis     ICD-10-CM    1. Acute CVA (cerebrovascular accident) (HCC)  I63.9               Assessment  Assessment details: Patient is a 38 y.o. Male who presents to skilled outpatient PT s/p CVA. He has PMH of ESRD on dialysis and has also had 2 prior CVA's. Overall LE strength is grossly 5/5 but he presents with deficits in sensation, coordination and balance. He demonstrated improvements in the following outcome measures since last reassessment: 5x STS, TUG, ABC, and gait speed. Pt's 6MWT improved by 165 ft indicating increased endurance, but pt still falls below the age matched norms. Pt's mCTSIB did decrease with eyes open and eyes closed on foam pad, indicating poor integration of vestibular and somatosensory systems. Pt continues to platuea at 48/56 on MEDRANO, remaining at a low fall risk. Pt's score did increase by 5% on the ABC, but he remains at a high fall risk per ABC, FGA, 5x STS. He continues to be considered low fall risk for TUG without AD, but fall risk with using AD due to slower speed of movement and performing wider turns with AD.  Plan to continue to focus on HIGT and HIIT as tolerated, along with balance both on firm and foam surfaces and gait exercises. He has met 5/6 short-term goals at this time, and 1 long-term goal. Patient will continue to benefit from skilled PT to achieve goals below and maximize function post CVA.          Impairments: Abnormal coordination, Abnormal gait, Abnormal muscle tone, Abnormal or restricted ROM, Activity intolerance, Impaired balance, Impaired  physical strength, Lacks appropriate HEP, Poor posture, Poor body mechanics, Pain with function, Safety issue, Weight-bearing intolerance, Abnormal movement, Difficulty understanding, Abnormal muscle firing  Understanding of Dx/Px/POC: Good  Prognosis: Good      Patient verbalized understanding of POC.         Please contact me if you have any questions or recommendations. Thank you for the referral and the opportunity to share in Mateus Mckeon's care.        Plan  Plan details: PT 2-3x/week   Patient would benefit from: Skilled PT  Planned modality interventions: Biofeedback, Cryotherapy, TENS, Thermotherapy  Planned therapy interventions: Abdominal trunk stabilization, ADL training, Balance, Balance/WB training, Breathing training, Body mechanics training, Coordination, Functional ROM exercises, Gait training, HEP, Joint Mobilization, Manual Therapy, Ma taping, Motor coordination training, Neuromuscular re-education, Patient education, Postural training, Strengthening, Stretching, Therapeutic activities, Therapeutic exercises, Therapeutic training, Transfer training, Activity modification, Work reintegration  Frequency: 2-3x/wk  Duration in weeks: 12 weeks  Plan of Care beginning date: 5/14/2024  Plan of Care expiration date: 12 weeks - 8/6/2024  Treatment plan discussed with: Patient         Goals  Short Term Goals (4 weeks):    Patient will improve 6MWT by 100ft w/LRAD demonstrating significant improvements in endurance  w/in 4 weeks - MET  Patient will be independent with HEP within 4 weeks - NOT MET  Patient will be able to ambulate household distances (100ft) or greater with LRAD  within 4 weeks - MET (doesn't use rollator in the house)  Patient will demonstrated improved LE strength and endurance by improved 5xSTS to 30 sec or less within 4 weeks - MET  Patient will perform Callejas to further assess static balance and fall risk. - MET  Patient will perform 10 meter walk test to further assess gait  speed and fall risk. - MET  Patient will improve ABC to 80% to demonstrate increased balance confidence and reduced fall risk. - NOT MET      Long Term Goals (12 weeks):  - Patient will be independent in a comprehensive home exercise program- NOT MET  - Patient will improve gait speed to 1.0 m/s to improve safety with community ambulation - MET  - Patient will improve MEDRANO by 6 points to facilitate return to safe independent ambulation- NOT MET  - Patient will improve scoring on FGA by 4 points to progress safety with dynamic tasks- NOT MET  - Patient will improve 6 Minute Walk Test score by 190 feet to promote improved cardiovascular endurance- NOT MET   - Patient will report going on walks at least 3 days per week to promote independence and improved cardiovascular endurance- NOT MET  - Patient will be able to ascend/descend stairs reciprocally with 1 UE assist to promote independence and safety with ADLs- NOT MET  - Patient will demonstrated improved LE strength and endurance by improved 5xSTS to 13 sec or less - NOT MET        Cut off score    All date taken from [facility] Neuro Section or Rehab Measures      Medrano:  Siva et al., 2018  MDC: 2.7 pts    She rodríguez al., 2011  Cut-off score: 45/56    Chronic CVA  < 44/56 high risk for falls (Siva et al., 2018)  < 47.5/56 slow walker status (Jus et al., 2011) 5xSTS: Jean Carlos 2010  MDC: 2.3 sec  Age Norms:  60-69: 11.1 sec  70-79: 12.6 sec  80-89: 14.8 sec    Yamileth, 2010, Chronic Stroke  Chronic CVA: 12 sec   TUG  Nubia rodríguez al., 2005  MDC: 2.9 sec    Cut off score:  >13.5 sec community dwelling adults  >32.2 frail elderly  <20 I for basic transfers  >30 dependent on transfers 10 Meter Walk Test:   Talia et al., 2006  Small meaningful change: 0.06 m/s  Substantial meaningful change: 0.14 m/s  MCID: 0.16 m/s    < 0.4 m/s household ambulators  0.4 - 0.8 m/s limited community ambulators  > 0.8 m/s community ambulators   FGA: Deepika et al., 2010  MCID: 4.2  "pts  Geriatrics/community < 22/30 fall risk  Geriatrics/community < 20/30 unexplained falls DGI  MDC: vestibular - 4 pts  MDC: geriatric/community - 3 pts  Falls risk <19/24   6 Minute Walk Test  Talia et al., 2006  MDC: 60.98 m (200.01 ft)    Verónica Norwood, & Ally, 2012  MCID: 34.4 m    Age Norms  60-69: M - 1876 ft   F - 1765 ft  70-79: M - 1729 ft   F - 1545 ft  80-89: M - 1368 ft   F - 1286 ft Modified Joel  0: No increase in tone  1: Catch and release or min resistance at end range  1+: Catch f/b min resistance throughout remainder (< half ROM)  2: Easily moved, but more marked tone throughout most ROM  3: Significant tone, PROM difficult  4: Rigid   MiniBest: Glenda et al., 2013  CVA < 17.5 fall risk Pass (Acute CVA)  MDC: 1.8 points (acute), 3.2 points (chronic)         Subjective    History of Present Illness  Mechanism of injury: Pt had CVA end of January (3rd stroke). He also has ESRD and goes to dialysis 3 days/week. He mostly uses rollator but at home he furniture walks. His parents need to assist him more now than prior to stroke. He likes to spend time with nieces and nephews and attend their sporting events.     Updated (4/2/2024): Patient reports overall satisfaction with PT services thus far, feels his stamina has gotten better. He wants to continue to work on this so that he has \"the energy to do things.\"    Update (5/2/2024): Pt reports he feels like his stamina is actually getting worse. He has not been as diligent as should be with using his treadmill at home. Overall feeling more tired, not much energy. Denies any falls, but still experiences occasional loss of balance.     Update (6/17/24): Pt reports he feels his stamina is still decreased, but he feels he is slowly improving with balance. He reports no new falls at home since last re-eval.    Primary AD: rollator (PLOF he mostly used SPC and only used rollator for long distances like going to nephew's soccer game)   Assist level at home: " lives with parents who are assisting with ADL's and IADL's, but he is transferring and walking independently.   WC usage: none  PLOF: using SPC mostly, still required some assist from parents for ADL's and IADL's   Patient goals: to walk with SPC again, work on balance, build up walking endurance     Pain  Pain in both feet due to neuropathy     Social Support  Steps to enter house: 2 NIRU  Stairs in house: full flight to 2nd floor (able to perform independently)    Lives in: 2 story home   Lives with: parents    Employment status: disabled   Hand dominance: right    Treatments  Previous treatment: PT [address]  Current treatment: PT, OT  Diagnostic Testing:       Objective     Vitals  - HR: 79 bpm  - BP: 140/70 mmHg   - SPO2: 97%    LE MMT  - R Hip Flexion: 5/5  - L Hip Flexion: 5/5  - R Hip Extension: 5/5  - L Hip Extension: 5/5  - R Hip Abduction: 5/5  - L Hip abduction: 5/5  - R Hip adduction: 5/5  - L Hip adduction: 5/5  - R Knee Extension: 5/5 - L Knee Extension: 5/5  - R Knee Flexion: 5/5  - L Knee Flexion: 5/5  - R Ankle DF: 5/5  - L Ankle DF: 5/5  - R Ankle PF: 5/5  - L Ankle PF: 5/5    Sensation  - Light touch: neuropathy both feet up to mid calf   - Temperature: diminished     Coordination  - Alternating Toe Taps: impaired  - Heel to shin: impaired     Clonus  - L: Yes  - R: Yes  - Fatiguing: Yes  - Number of beats: 1-2 beats    Vision  - Glasses: Yes  - Abnormalities prior to CVA: Yes, retinopathy (gets shots every 3 months)  - Abnormalities post CVA: No,     Neglect  - R sided: No  - L sided: No    Gait  Wide COLBY, ataxic, decreased step length      Outcome Measures Initial Eval  1/30/24 Re-eval  2/20/24 PN  4/2/2024 RE  5/2/2024 RE   6/17/24    5xSTS 31.25 sec 52.20 sec no UE's    (1 LOB) 19.91 sec no UE 20.14 sec  No UE  15.84 sec  No UE    TUG 16.3 sec with rollator    23.6 sec no AD 20.68 sec with rollator    18.44 sec no AD 23.41 sec with rollator    12.92 sec no AD 19.69 sec with rollator    12.45  sec no AD  15.79 secs with rollator    11.54 secs no AD    10 meter  Next visit> 0.88 m/s 1.08 m/s Self selected:  .80 m/s  Fast: 1.29 m/s    MEDRANO  Next visit> 44/56 48/56 48/56    FGA 12/30 Next visit> 12/30 12/30 14/30    6MWT 900 ft 710 ft  Rollator  760 ft no  ft  No AD  885 ft no AD    mCTSIB  FTEO firm  FTEC firm  FTEO foam  FTEC foam   Next visit> 30 sec  30 sec  5.5 sec  defer 30 sec  30 sec  30 sec  1 sec 30 sec  30 sec  9 secs  Unable to maintain    ABC  65% 43.75% 56.88% 61.88%         Precautions: Check BP's RUE only (fistula LUE)   Past Medical History:   Diagnosis Date    Acute kidney injury (HCC)     Ambulates with cane     Anuria     Anxiety     Cellulitis of right elbow 03/31/2021    Chronic kidney disease     Depression     Diabetes mellitus (HCC)     Diarrhea     Emesis 10/24/2020    End stage renal disease (HCC) 02/11/2018    Formatting of this note might be different from the original. Last Assessment & Plan:  Secondary to DM.  On nightly PD.  Followed by [facility].  Patient considering transplant for kidney and pancreas through [facility] Formatting of this note might be different from the original. Last Assessment & Plan:  Formatting of this note might be different from the original. 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    Esophagitis 07/21/2015    Falls     Gastroparesis     GERD (gastroesophageal reflux disease)     History of shingles 2010    History of transfusion 02/2018    no adverse reaction    Hyperlipidemia     Hyperphosphatemia     Hypertension     Hypoglycemia 07/15/2022    Itching     Mastoiditis of right side 07/15/2022    Muscle weakness     general unsteadiness    Obesity (BMI 30.0-34.9) 09/09/2019    Orthostatic hypotension 10/25/2020    Peripheral polyneuropathy 11/20/2019    PONV (postoperative nausea and vomiting) 01/26/2018    Protein-calorie malnutrition (HCC) 11/23/2020    Recurrent peritonitis (HCC) due to peritoneal dialysis  catheter 07/31/2020    Retinopathy     Seizures ([facility])     early 2020 - one time    Skin abnormality     some dime size areas where skin was scratched from itching    Spontaneous bacterial peritonitis ([facility]) 10/19/2020    Squamous cell skin cancer     left temple    Stroke ([facility])     x2 - off balance/no driving/fatigue    Swelling of both lower extremities     Traumatic onycholysis 07/21/2022    Vomiting     Wears glasses

## 2024-06-17 NOTE — PROGRESS NOTES
Daily Note     Today's date: 2024  Patient name: Mateus Mckeon  : 1985  MRN: [id number]  Referring provider: Dania Sher DO  Dx:   Encounter Diagnosis     ICD-10-CM    1. Cerebrovascular accident (CVA) due to other mechanism (HCC)  I63.89         Start Time: 0845  Stop Time: 0930  Total time in clinic (min): 45 minutes    PLAN OF CARE START: 24  PLAN OF CARE END: 2024  FREQUENCY: 2 times a week  PRECAUTIONS: Renal failure, actively going through dialysis; type 1 diabetes; HTN; SAH; seizure (last one was 2019)  ON FLUID RESTRICTIONS--DO NOT OFFER WATER    Subjective: Nothing new reported today    Objective: See treatment diary below  ON FLUID RESTRICTIONS--DO NOT OFFER WATER    NMR:  -Completed 3x10 standing push ups at parallel bars for proprioceptive input, UE strength.   -Reaching with RUE, 2lb weight for prop input and to work on UE endurance while engaged in block activity (retrieve from R side to encourage external rotation, then place into container placed anteriorly on table using full elbow extension) to improve UE ROM/strength, stability, endurance,  strength and grasp.   -Engaged in peg board activity with pt picking up pegs from the R side, placing into board on table, picking up 1-2 pegs at a time. Started with 2 lb wrist weight donned, however removed weight due to complaints of R UE fatigue. Focus on FMC, dexterity, UE endurance.     Assessment: Pt tolerated activities fairly. Fatigued noted throughout session, requiring increased breaks between activites.  Pt would benefit from continued skilled occupational therapy services to improve fine motor coordination, dexterity, stregnth, UE function, and functional cognition.    Plan: Continue per plan of care.     SHORT TERM GOALS ADDED 24:  Pt will increase sustained attention by completing trail making part A in 35 seconds to complete IADLs NOT MET  Pt will increase divided attention by completing trail making  part B in 1 minute 30 seconds to complete IADLs NOT MET  Pt will increase delayed recall and recall 4 /5 items on MOCA to complete IADLs GOAL MET    LONG TERM GOALS ADDED 2/20/24:  Pt will increase sustained attention by completing trail making part A in 38 seconds to complete IADLs  Pt will increase divided attention by completing trail making part B in 1 minute 10 seconds to complete IADLs  Pt will increase delayed recall and recall 5/5 items on MOCA to complete IADLs  Resume right  hand dominance to refined functional assist with all activities of daily living

## 2024-06-20 ENCOUNTER — OFFICE VISIT (OUTPATIENT)
Facility: CLINIC | Age: 39
End: 2024-06-20
Payer: MEDICARE

## 2024-06-20 DIAGNOSIS — I63.89 CEREBROVASCULAR ACCIDENT (CVA) DUE TO OTHER MECHANISM (HCC): Primary | ICD-10-CM

## 2024-06-20 DIAGNOSIS — I63.9 ACUTE CVA (CEREBROVASCULAR ACCIDENT) (HCC): Primary | ICD-10-CM

## 2024-06-20 PROCEDURE — 97112 NEUROMUSCULAR REEDUCATION: CPT

## 2024-06-20 PROCEDURE — 97110 THERAPEUTIC EXERCISES: CPT

## 2024-06-20 NOTE — PROGRESS NOTES
Daily Note     Today's date: 2024  Patient name: Mateus Mckeon  : 1985  MRN: [id number]  Referring provider: Dania Sher DO  Dx:   Encounter Diagnosis     ICD-10-CM    1. Acute CVA (cerebrovascular accident) (HCC)  I63.9                           POC expires Auth Status Total   Visits  Start date  Expiration date PT/OT + Visit Limit? Co-Insurance   24     bomn                                                  Session performed by Moni LAGUNAS, under the direct supervision of Marge Gilbert PT, DPT.       Subjective: Pt reports to PT with significant bouts of dizziness and OT advised that his BP was running low. Pt reports he had dialysis treatment yesterday. Upon presentation, pt had head placed into hands and then leaned onto the back of chair, with observed paleness in his face.      Objective: See treatment diary below  *FLUID RESTRICTION- do not offer water*     Vitals taken at beginning of session:  - 118/62 mmHg (RUE seated)   -2 mins after LAQs- 114/62 (RUE seated)      Assessment: Patient not able to tolerate treatment today due to increased reports of dizziness and low BP. Pt kindly declined therapy after performing 2 mins of LAQs. Pt walked to the front of the building and sat into a chair with supervision instructed to wait until his father came into the building to pick him up. Patient would benefit from continued PT to improve balance, endurance, and reduce fall risk.       Plan: Continue per plan of care.  Gait belt.             Outcome Measures Initial Eval  24 Re-eval  24 PN  2024 RE  2024 RE   24     5xSTS 31.25 sec 52.20 sec no UE's    (1 LOB) 19.91 sec no UE 20.14 sec  No UE  15.84 sec  No UE     TUG 16.3 sec with rollator     23.6 sec no AD 20.68 sec with rollator     18.44 sec no AD 23.41 sec with rollator     12.92 sec no AD 19.69 sec with rollator    12.45 sec no AD  15.79 secs with rollator     11.54 secs no AD     10 meter   Next visit>  0.88 m/s 1.08 m/s Self selected:  .80 m/s  Fast: 1.29 m/s     MEDRANO   Next visit> 44/56 48/56 48/56     FGA 12/30 Next visit> 12/30 12/30 14/30     6MWT 900 ft 710 ft  Rollator  760 ft no  ft  No AD  885 ft no AD     mCTSIB  FTEO firm  FTEC firm  FTEO foam  FTEC foam    Next visit> 30 sec  30 sec  5.5 sec  defer 30 sec  30 sec  30 sec  1 sec 30 sec  30 sec  9 secs  Unable to maintain     ABC   65% 43.75% 56.88% 61.88%

## 2024-06-20 NOTE — PROGRESS NOTES
Daily Note     Today's date: 2024  Patient name: Mateus Mckeon  : 1985  MRN: [id number]  Referring provider: Dania Sher DO  Dx:   Encounter Diagnosis     ICD-10-CM    1. Cerebrovascular accident (CVA) due to other mechanism (HCC)  I63.89                    PLAN OF CARE START: 24  PLAN OF CARE END: 2024  FREQUENCY: 2 times a week  PRECAUTIONS: Renal failure, actively going through dialysis; type 1 diabetes; HTN; SAH; seizure (last one was 2019)  ON FLUID RESTRICTIONS--DO NOT OFFER WATER    Subjective: Pt reports being tired from dialysis yesterday. Pt appears significantly fatigued throughout session, requiring frequent rest breaks. BP at end of session 118/74. Contacted PT to inform them of this information prior to session.     Objective: See treatment diary below  ON FLUID RESTRICTIONS--DO NOT OFFER WATER    TE:  Standing needlethreads x15 at windowsill. Pt required min vcs for proper form. Pt then performed pushups x15 at windowsill focusing on UE strengthening and weightbearing for proprioceptive input and spasticity management. Pt required seated rest break between exercises d/t fatigue and dizziness.    Pt performed seated rows x25 using green theraband. Focused on scapular protraction and retraction to improve scalpulohumeral rhythm and improve UE strength and coordination. Pt required a seated rest break FDC between exercise d/t c/o dizziness.     NMR:  Pt performed dowel rotation in RUE focusing on in hand manipulation and translation. Pt asked to state animals A-Z while rotating dowel and locating letters in order. Pt completed letters A-J before activity terminated d/t frustration and fatigue.    Pt performed pixy cubes puzzle x1 in seated with pt using the R UE to manipulate cubes in hand and insert into board to match design presented visually. Pt requires increased time for completion d/t dizziness and fatigue throughout. Focused on sustained attention, FMC,  dexterity.    Assessment: Pt tolerated activities fairly. Fatigued and dizziness noted throughout session, requiring increased breaks between activites.  Pt would benefit from continued skilled occupational therapy services to improve fine motor coordination, dexterity, stregnth, UE function, and functional cognition.    Plan: Continue per plan of care.     SHORT TERM GOALS ADDED 2/20/24:  Pt will increase sustained attention by completing trail making part A in 35 seconds to complete IADLs NOT MET  Pt will increase divided attention by completing trail making part B in 1 minute 30 seconds to complete IADLs NOT MET  Pt will increase delayed recall and recall 4 /5 items on MOCA to complete IADLs GOAL MET    LONG TERM GOALS ADDED 2/20/24:  Pt will increase sustained attention by completing trail making part A in 38 seconds to complete IADLs  Pt will increase divided attention by completing trail making part B in 1 minute 10 seconds to complete IADLs  Pt will increase delayed recall and recall 5/5 items on MOCA to complete IADLs  Resume right  hand dominance to refined functional assist with all activities of daily living

## 2024-06-21 DIAGNOSIS — R42 VERTIGO: ICD-10-CM

## 2024-06-21 RX ORDER — ONDANSETRON 4 MG/1
4 TABLET, FILM COATED ORAL EVERY 8 HOURS PRN
Qty: 90 TABLET | Refills: 1 | Status: SHIPPED | OUTPATIENT
Start: 2024-06-21

## 2024-06-21 NOTE — TELEPHONE ENCOUNTER
Reason for call:   [x] Refill   [] Prior Auth  [] Other:     Office:   [x] PCP/Provider -   Dania Sher,   PCP - General  [] Specialty/Provider -     Medication:   ondansetron (ZOFRAN) 4 mg tablet 4 mg, Every 8 hours PRN # 90         Pharmacy: [facility] [place] [place] [address]      Does the patient have enough for 3 days?   [] Yes   [x] No - Send as HP to POD

## 2024-06-24 ENCOUNTER — TELEPHONE (OUTPATIENT)
Age: 39
End: 2024-06-24

## 2024-06-24 ENCOUNTER — OFFICE VISIT (OUTPATIENT)
Facility: CLINIC | Age: 39
End: 2024-06-24
Payer: MEDICARE

## 2024-06-24 DIAGNOSIS — I63.89 CEREBROVASCULAR ACCIDENT (CVA) DUE TO OTHER MECHANISM (HCC): Primary | ICD-10-CM

## 2024-06-24 DIAGNOSIS — I63.9 ACUTE CVA (CEREBROVASCULAR ACCIDENT) (HCC): Primary | ICD-10-CM

## 2024-06-24 PROCEDURE — 97112 NEUROMUSCULAR REEDUCATION: CPT

## 2024-06-24 PROCEDURE — 97110 THERAPEUTIC EXERCISES: CPT

## 2024-06-24 NOTE — TELEPHONE ENCOUNTER
Father of the patient was calling to refill the gabapentin 100 mg.     Advised that a new script was sent to the pharmacy back on 4/3/2024.     Asked what the date was on the bottle in his hand. It was dated from March.     Father was unsure if they ever picked up the new script from April. He is going to double check with the pharmacy.     If the father calls back and they did not pick it up, Please assist with a medication refill request and note that they were never able to  the script in April. To please cancel and resend.     Thank you

## 2024-06-24 NOTE — PROGRESS NOTES
"Daily Note     Today's date: 2024  Patient name: Mateus Mckeon  : 1985  MRN: [id number]  Referring provider: Dania Sher DO  Dx:   Encounter Diagnosis     ICD-10-CM    1. Acute CVA (cerebrovascular accident) (HCC)  I63.9                             POC expires Auth Status Total   Visits  Start date  Expiration date PT/OT + Visit Limit? Co-Insurance   24     bomn                                            Patient treated by JANNETH Bateman under supervision from this PT. We discussed the case to ensure appropriate continuation and progression of care and I reviewed the documentation.        Subjective: Pt reports to PT feeling much better than last time, reporting his glucose monitor was malfunctioning last visit. Pt does report he has some right hip pain, but it comes and goes.       Objective: See treatment diary below  *FLUID RESTRICTION- do not offer water*     Vitals taken at beginning of session:  - 124/78 mmHg (RUE seated)    NMR:  -10 laps down and back for 20 ft x 1  -Side stepping with 10# TT- 5 laps x 1  -Step ups 4\" step with foam pad- 20x  -Side stepping up to medium and small river rocks- 5 laps x 1  -Agility Ladder FWD in and outs with 10# TT- 4 laps x 1   -Agility ladder lateral in and outs with 10# TT- 2 laps x 1    Post session vitals:  -BP: 142/68 mmHg (RUE seated)  -HR: 76 bpm  -O2: 97%    Assessment: Patient tolerated treatment well today. Pt able to ambulate for longer distances without any LOB. With side stepping on medium and small river rocks, pt utilized ankle and hip strategy to maintain balance. With step ups to foam pad, pt had to utilize a stepping strategy posteriorly with increased postural sway descending off the step. Pt demonstrated increased caution with side stepping with TT as indicated with decreased step length, but able to correct LOB utilizing hip strategy. Patient would benefit from continued PT to improve balance, endurance, and reduce fall " risk.       Plan: Continue per plan of care.  Gait belt. Incorporate more balance activities on foam surfaces             Outcome Measures Initial Eval  1/30/24 Re-eval  2/20/24 PN  4/2/2024 RE  5/2/2024 RE   6/17/24     5xSTS 31.25 sec 52.20 sec no UE's    (1 LOB) 19.91 sec no UE 20.14 sec  No UE  15.84 sec  No UE     TUG 16.3 sec with rollator     23.6 sec no AD 20.68 sec with rollator     18.44 sec no AD 23.41 sec with rollator     12.92 sec no AD 19.69 sec with rollator    12.45 sec no AD  15.79 secs with rollator     11.54 secs no AD     10 meter   Next visit> 0.88 m/s 1.08 m/s Self selected:  .80 m/s  Fast: 1.29 m/s     MEDRANO   Next visit> 44/56 48/56 48/56     FGA 12/30 Next visit> 12/30 12/30 14/30     6MWT 900 ft 710 ft  Rollator  760 ft no  ft  No AD  885 ft no AD     mCTSIB  FTEO firm  FTEC firm  FTEO foam  FTEC foam    Next visit> 30 sec  30 sec  5.5 sec  defer 30 sec  30 sec  30 sec  1 sec 30 sec  30 sec  9 secs  Unable to maintain     ABC   65% 43.75% 56.88% 61.88%

## 2024-06-24 NOTE — PROGRESS NOTES
Daily Note     Today's date: 2024  Patient name: Mateus Mckeon  : 1985  MRN: [id number]  Referring provider: Dania Sher DO  Dx:   Encounter Diagnosis     ICD-10-CM    1. Cerebrovascular accident (CVA) due to other mechanism (HCC)  I63.89         Start Time: 0804  Stop Time: 0844  Total time in clinic (min): 40 minutes    PLAN OF CARE START: 24  PLAN OF CARE END: 2024  FREQUENCY: 2 times a week  PRECAUTIONS: Renal failure, actively going through dialysis; type 1 diabetes; HTN; SAH; seizure (last one was 2019)  ON FLUID RESTRICTIONS--DO NOT OFFER WATER    Subjective: Pt reports he is feeling better today overall.     Objective: See treatment diary below  ON FLUID RESTRICTIONS--DO NOT OFFER WATER    TE:  -Standing push up at parallel bars 3x10 sets for proprioceptive input, UE strength  -Bilateral ER with red theraband to rotator cuff endurance/stability required while reaching.     NMR:  -In standing performed activity with banagrams and facility therapy dog for improved participation/motiviation. Pt picking up bananagram tiles, spelling out fruits going in alphabetical order. Pt picking up 3-5 letter tiles at a time. Pt working on FMC, dexterity, functional activity tolerance.   -lacing activity in standing with using vest on facility dog for improved participation. Pt lacing mock shoe 3x using b/l hands to address FMC, bilateral coordination, functional activity tolerance.     Assessment: Pt tolerated session well today. Continues to require rest breaks throughout session, but overall tolerated all activities well.  Pt would benefit from continued skilled occupational therapy services to improve fine motor coordination, dexterity, stregnth, UE function, and functional cognition.    Plan: Continue per plan of care.     SHORT TERM GOALS ADDED 24:  Pt will increase sustained attention by completing trail making part A in 35 seconds to complete IADLs NOT MET  Pt will increase  divided attention by completing trail making part B in 1 minute 30 seconds to complete IADLs NOT MET  Pt will increase delayed recall and recall 4 /5 items on MOCA to complete IADLs GOAL MET    LONG TERM GOALS ADDED 2/20/24:  Pt will increase sustained attention by completing trail making part A in 38 seconds to complete IADLs  Pt will increase divided attention by completing trail making part B in 1 minute 10 seconds to complete IADLs  Pt will increase delayed recall and recall 5/5 items on MOCA to complete IADLs  Resume right  hand dominance to refined functional assist with all activities of daily living

## 2024-06-26 ENCOUNTER — TELEPHONE (OUTPATIENT)
Dept: INTERNAL MEDICINE CLINIC | Facility: CLINIC | Age: 39
End: 2024-06-26

## 2024-06-26 DIAGNOSIS — R20.0 RIGHT UPPER EXTREMITY NUMBNESS: ICD-10-CM

## 2024-06-26 DIAGNOSIS — E10.42 DIABETIC POLYNEUROPATHY ASSOCIATED WITH TYPE 1 DIABETES MELLITUS (HCC): ICD-10-CM

## 2024-06-26 RX ORDER — GABAPENTIN 100 MG/1
CAPSULE ORAL
Qty: 90 CAPSULE | Refills: 0 | Status: CANCELLED | OUTPATIENT
Start: 2024-06-26

## 2024-06-26 NOTE — TELEPHONE ENCOUNTER
Patient's  father would like the office to call back regarding prescription     Per father, patient was told by Neurology that he should take another capsule of Gabapentin.    gabapentin (NEURONTIN) 100 mg capsule [[id number]]    Order Details  Dose, Route, Frequency: As Directed   Dispense Quantity: 90 capsule Refills: 3          Sig: [id number] CAPSULES BY MOUTH AT BEDTIME         Start Date: 04/03/24 End Date: --   Written Date: 04/03/24 Expiration Date: 04/03/25       Associated Diagnoses: Diabetic polyneuropathy associated with type 1 diabetes mellitus (HCC) [E10.42]; Right upper extremity numbness [R20.0]   Original Order: gabapentin (NEURONTIN) 100 mg capsule [[id number]]   Providers    Authorizing Provider:  Norma Penny MD       Patient father would like Dr. Sher to prescribe 3 times a day.                                        to prescribe 3 times a day. Pt was seen by the Neurology and was told to take an extra Gabapentin.

## 2024-06-27 ENCOUNTER — OFFICE VISIT (OUTPATIENT)
Facility: CLINIC | Age: 39
End: 2024-06-27
Payer: MEDICARE

## 2024-06-27 ENCOUNTER — HOSPITAL ENCOUNTER (OUTPATIENT)
Dept: NON INVASIVE DIAGNOSTICS | Facility: CLINIC | Age: 39
Discharge: HOME | End: 2024-06-27

## 2024-06-27 ENCOUNTER — OFFICE VISIT (OUTPATIENT)
Dept: PODIATRY | Facility: CLINIC | Age: 39
End: 2024-06-27
Payer: MEDICARE

## 2024-06-27 VITALS
WEIGHT: 196 LBS | RESPIRATION RATE: 18 BRPM | BODY MASS INDEX: 32.65 KG/M2 | DIASTOLIC BLOOD PRESSURE: 80 MMHG | SYSTOLIC BLOOD PRESSURE: 144 MMHG | HEART RATE: 78 BPM | HEIGHT: 65 IN

## 2024-06-27 DIAGNOSIS — I63.9 ACUTE CVA (CEREBROVASCULAR ACCIDENT) (HCC): Primary | ICD-10-CM

## 2024-06-27 DIAGNOSIS — L60.1 ONYCHOLYSIS: ICD-10-CM

## 2024-06-27 DIAGNOSIS — I63.89 CEREBROVASCULAR ACCIDENT (CVA) DUE TO OTHER MECHANISM (HCC): Primary | ICD-10-CM

## 2024-06-27 DIAGNOSIS — E10.42 DIABETIC POLYNEUROPATHY ASSOCIATED WITH TYPE 1 DIABETES MELLITUS (HCC): Primary | ICD-10-CM

## 2024-06-27 PROCEDURE — 97112 NEUROMUSCULAR REEDUCATION: CPT

## 2024-06-27 PROCEDURE — 97110 THERAPEUTIC EXERCISES: CPT

## 2024-06-27 PROCEDURE — 99213 OFFICE O/P EST LOW 20 MIN: CPT | Performed by: PODIATRIST

## 2024-06-27 NOTE — PROGRESS NOTES
Daily Note     Today's date: 2024  Patient name: aMteus Mckeon  : 1985  MRN: [id number]  Referring provider: Dania Sher DO  Dx:   Encounter Diagnosis     ICD-10-CM    1. Cerebrovascular accident (CVA) due to other mechanism (HCC)  I63.89         Start Time: 08  Stop Time: 0845  Total time in clinic (min): 42 minutes    PLAN OF CARE START: 24  PLAN OF CARE END: 2024  FREQUENCY: 2 times a week  PRECAUTIONS: Renal failure, actively going through dialysis; type 1 diabetes; HTN; SAH; seizure (last one was 2019)  ON FLUID RESTRICTIONS--DO NOT OFFER WATER    Subjective: Nothing new reported today    Objective: See treatment diary below  ON FLUID RESTRICTIONS--DO NOT OFFER WATER    TE:  -Standing push up at parallel bars 3x10 sets for proprioceptive input, UE strength  -Bilateral ER with red theraband to rotator cuff endurance/stability required while reaching. 3x10 sets  -Bilateral green flex bar bends, 30x up and 30x down to address  strength, forearm strength.     NMR:  -In seated performed tricky fingers puzzle to address FMC, dexterity, finger isolation,  skills, sustained attention.   -In standing completed colored blocks puzzles with blocks placed laterally toto the R on a lower surface and building on table anteriorly. 1 lb wrist weight donned to the R UE. Working on R UE endurance, rotation ROM, functional activity tolerance,  skills, FMC.     Assessment: Pt tolerated session well today. Less rest breaks observed during today's session.  Pt would benefit from continued skilled occupational therapy services to improve fine motor coordination, dexterity, stregnth, UE function, and functional cognition.    Plan: Continue per plan of care.     SHORT TERM GOALS ADDED 24:  Pt will increase sustained attention by completing trail making part A in 35 seconds to complete IADLs NOT MET  Pt will increase divided attention by completing trail making part B in 1 minute 30  seconds to complete IADLs NOT MET  Pt will increase delayed recall and recall 4 /5 items on MOCA to complete IADLs GOAL MET    LONG TERM GOALS ADDED 2/20/24:  Pt will increase sustained attention by completing trail making part A in 38 seconds to complete IADLs  Pt will increase divided attention by completing trail making part B in 1 minute 10 seconds to complete IADLs  Pt will increase delayed recall and recall 5/5 items on MOCA to complete IADLs  Resume right  hand dominance to refined functional assist with all activities of daily living

## 2024-06-27 NOTE — PROGRESS NOTES
"Daily Note     Today's date: 2024  Patient name: Mateus Mckeon  : 1985  MRN: [id number]  Referring provider: Dania Sher DO  Dx:   Encounter Diagnosis     ICD-10-CM    1. Acute CVA (cerebrovascular accident) (HCC)  I63.9                               POC expires Auth Status Total   Visits  Start date  Expiration date PT/OT + Visit Limit? Co-Insurance   24     bomn                                                 Subjective: Pt reports to PT feeling \"pretty good\", no new complaints.       Objective: See treatment diary below  *FLUID RESTRICTION- do not offer water*     Vitals taken at beginning of session:  - 138/62 mmHg (RUE seated)    NMR:  -Side stepping with 10# TT- 5 laps x 1  -Step ups 4\" step with foam pad- 20x  -Agility Ladder FWD in and outs with 10# TT- 4 laps x 1   -Agility ladder lateral in and outs with 10# TT- 4 laps x 1  - Fwd hurdles 6\" w/ 5# TT, 4 laps  - Sidestepping 6\" hurdles 5# TT, 4 laps         Assessment: Patient tolerated treatment well today with decreased rest breaks needed for energy recovery. Intermittent LOB when negotiating agility ladder with TT, requiring overall min A from PT to prevent posterior fall.   He required CGA for stability when performing step ups from foam pad, with increased caution exhibited when stepping up with RLE.  Patient would benefit from continued PT to improve balance, endurance, and reduce fall risk.       Plan: Continue per plan of care.  Gait belt. Incorporate more balance activities on foam surfaces             Outcome Measures Initial Eval  24 Re-eval  24 PN  2024 RE  2024 RE   24     5xSTS 31.25 sec 52.20 sec no UE's    (1 LOB) 19.91 sec no UE 20.14 sec  No UE  15.84 sec  No UE     TUG 16.3 sec with rollator     23.6 sec no AD 20.68 sec with rollator     18.44 sec no AD 23.41 sec with rollator     12.92 sec no AD 19.69 sec with rollator    12.45 sec no AD  15.79 secs with rollator     11.54 secs no AD   "   10 meter   Next visit> 0.88 m/s 1.08 m/s Self selected:  .80 m/s  Fast: 1.29 m/s     MEDRANO   Next visit> 44/56 48/56 48/56     FGA 12/30 Next visit> 12/30 12/30 14/30     6MWT 900 ft 710 ft  Rollator  760 ft no  ft  No AD  885 ft no AD     mCTSIB  FTEO firm  FTEC firm  FTEO foam  FTEC foam    Next visit> 30 sec  30 sec  5.5 sec  defer 30 sec  30 sec  30 sec  1 sec 30 sec  30 sec  9 secs  Unable to maintain     ABC   65% 43.75% 56.88% 61.88%

## 2024-06-27 NOTE — PROGRESS NOTES
Patient seen as an emergency.  Patient accidentally tore his right fourth nail off when putting his socks on.  The nail is hanging on the nailbed.  Pain is minimal as patient is neuropathic.    Treatment: Removed right fourth toenail without need of anesthetic.  No evidence of infection.  Bacitracin dressing applied.  Patient told to utilize a Neosporin dressing until eschar forms in the nailbed.  He is rescheduled for his palliative care in the future.

## 2024-07-01 ENCOUNTER — OFFICE VISIT (OUTPATIENT)
Facility: CLINIC | Age: 39
End: 2024-07-01
Payer: MEDICARE

## 2024-07-01 DIAGNOSIS — I63.9 ACUTE CVA (CEREBROVASCULAR ACCIDENT) (HCC): Primary | ICD-10-CM

## 2024-07-01 DIAGNOSIS — I63.89 CEREBROVASCULAR ACCIDENT (CVA) DUE TO OTHER MECHANISM (HCC): Primary | ICD-10-CM

## 2024-07-01 PROCEDURE — 97112 NEUROMUSCULAR REEDUCATION: CPT

## 2024-07-01 PROCEDURE — 97112 NEUROMUSCULAR REEDUCATION: CPT | Performed by: OCCUPATIONAL THERAPIST

## 2024-07-01 NOTE — PROGRESS NOTES
"Daily Note     Today's date: 2024  Patient name: Mateus Mckeon  : 1985  MRN: [id number]  Referring provider: Dania Sher DO  Dx:   No diagnosis found.                          POC expires Auth Status Total   Visits  Start date  Expiration date PT/OT + Visit Limit? Co-Insurance   24     bomn                                                 Subjective: Pt reports to PT feeling okay. Reported R hip pain starting two days ago but no falls. Patient think its related to sleeping position      Objective: See treatment diary below  *FLUID RESTRICTION- do not offer water*     Vitals taken at beginning of session:  - 118/62 mmHg (RUE seated)    NMR:  -Side stepping with 10# TT- 5 laps x 1  - STS with feet on small RR x 10 (2 sets), CG  -Step ups 4\" step with foam pad- 30 x  -Agility Ladder FWD in and outs with 10# TT- 4 laps x 1  - Fwd hurdles 6\" w/ 5# TT, 4 laps    Patient had complaints of feeling \"icky\".    - Glucose level: 171   - BP: 140/60       Assessment: Patient tolerated treatment well today with decreased rest breaks needed for energy recovery. Pt had reports of feeling \"icky\" during the middle of PT session. Vitals and blood sugar stable; pt did not want to end PT session early and wanted to continue with therapy. Multiple posterior LOB with eccentric step down; occasional increase recovery steps in attempts to recover his LOB. Decreased number of posterior LOB when pt was cued to decrease pace.  Patient would benefit from continued PT to improve balance, endurance, and reduce fall risk.       Plan: Continue per plan of care.  Gait belt. Incorporate more balance activities on foam surfaces             Outcome Measures Initial Eval  24 Re-eval  24 PN  2024 RE  2024 RE   24     5xSTS 31.25 sec 52.20 sec no UE's    (1 LOB) 19.91 sec no UE 20.14 sec  No UE  15.84 sec  No UE     TUG 16.3 sec with rollator     23.6 sec no AD 20.68 sec with rollator     18.44 sec no AD " 23.41 sec with rollator     12.92 sec no AD 19.69 sec with rollator    12.45 sec no AD  15.79 secs with rollator     11.54 secs no AD     10 meter   Next visit> 0.88 m/s 1.08 m/s Self selected:  .80 m/s  Fast: 1.29 m/s     MEDRANO   Next visit> 44/56 48/56 48/56     FGA 12/30 Next visit> 12/30 12/30 14/30     6MWT 900 ft 710 ft  Rollator  760 ft no  ft  No AD  885 ft no AD     mCTSIB  FTEO firm  FTEC firm  FTEO foam  FTEC foam    Next visit> 30 sec  30 sec  5.5 sec  defer 30 sec  30 sec  30 sec  1 sec 30 sec  30 sec  9 secs  Unable to maintain     ABC   65% 43.75% 56.88% 61.88%

## 2024-07-01 NOTE — PROGRESS NOTES
Daily Note     Today's date: 2024  Patient name: Mateus Mckeon  : 1985  MRN: [id number]  Referring provider: Dania Sher DO  Dx:   Encounter Diagnosis     ICD-10-CM    1. Cerebrovascular accident (CVA) due to other mechanism (HCC)  I63.89                    PLAN OF CARE START: 24  PLAN OF CARE END: 2024  FREQUENCY: 2 times a week  PRECAUTIONS: Renal failure, actively going through dialysis; type 1 diabetes; HTN; SAH; seizure (last one was 2019)  ON FLUID RESTRICTIONS--DO NOT OFFER WATER    Subjective: Nothing new reported today    Objective: See treatment diary below  ON FLUID RESTRICTIONS--DO NOT OFFER WATER    TE:  -Bilateral ER with red theraband to rotator cuff endurance/stability required while reaching. 3x10 sets  -Bilateral green flex bar bends, 40x up and 40x down to address  strength, forearm strength.   -Wrist roller with 1lb weight stabilizing with L and focusing on R wrist flexion/extension strength and muscular endurance x5 minutes with rest breaks as needed.     NMR:  -While sitting completed foam block transfer with calibrated gripper 20lbs resistance x18 to board, then restacked 3 high focusing on RUE motor control, FMC,  strength.  -In sitting completed qbitz holding 3 cubes at a time and reversing colors focusing on R hand dexterity and FMC,  skills, EF.   -In standing pt completed BITS visual scanning x60 numbers with 1lb wrist weight reaching to arm's length to address RUE motor control, proximal strength and muscular endurance, standing tolerance for IADLs.     Assessment: Pt tolerated session well today. Continues to demonstrate impaired R hand FMC, limited endurance. Pt would benefit from continued skilled occupational therapy services to improve fine motor coordination, dexterity, stregnth, UE function, and functional cognition.    Plan: Continue per plan of care.     SHORT TERM GOALS ADDED 24:  Pt will increase sustained attention by  completing trail making part A in 35 seconds to complete IADLs NOT MET  Pt will increase divided attention by completing trail making part B in 1 minute 30 seconds to complete IADLs NOT MET  Pt will increase delayed recall and recall 4 /5 items on MOCA to complete IADLs GOAL MET    LONG TERM GOALS ADDED 2/20/24:  Pt will increase sustained attention by completing trail making part A in 38 seconds to complete IADLs  Pt will increase divided attention by completing trail making part B in 1 minute 10 seconds to complete IADLs  Pt will increase delayed recall and recall 5/5 items on MOCA to complete IADLs  Resume right  hand dominance to refined functional assist with all activities of daily living

## 2024-07-02 ENCOUNTER — HOSPITAL ENCOUNTER (OUTPATIENT)
Dept: NON INVASIVE DIAGNOSTICS | Facility: CLINIC | Age: 39
Discharge: HOME | End: 2024-07-02

## 2024-07-02 DIAGNOSIS — I63.9 CEREBRAL INFARCTION, UNSPECIFIED (HCC): ICD-10-CM

## 2024-07-02 DIAGNOSIS — R91.1 INCIDENTAL LUNG NODULE: ICD-10-CM

## 2024-07-02 DIAGNOSIS — R56.9 UNSPECIFIED CONVULSIONS (HCC): ICD-10-CM

## 2024-07-02 DIAGNOSIS — E08.42 DIABETES MELLITUS DUE TO UNDERLYING CONDITION WITH DIABETIC POLYNEUROPATHY (HCC): ICD-10-CM

## 2024-07-02 DIAGNOSIS — N18.6 END STAGE RENAL DISEASE (HCC): ICD-10-CM

## 2024-07-08 ENCOUNTER — OFFICE VISIT (OUTPATIENT)
Facility: CLINIC | Age: 39
End: 2024-07-08
Payer: MEDICARE

## 2024-07-08 DIAGNOSIS — I63.9 ACUTE CVA (CEREBROVASCULAR ACCIDENT) (HCC): Primary | ICD-10-CM

## 2024-07-08 DIAGNOSIS — I63.89 CEREBROVASCULAR ACCIDENT (CVA) DUE TO OTHER MECHANISM (HCC): Primary | ICD-10-CM

## 2024-07-08 PROCEDURE — 97112 NEUROMUSCULAR REEDUCATION: CPT

## 2024-07-08 PROCEDURE — 97110 THERAPEUTIC EXERCISES: CPT

## 2024-07-08 NOTE — PROGRESS NOTES
"Daily Note     Today's date: 2024  Patient name: Mateus Mckeon  : 1985  MRN: [id number]  Referring provider: Dania Sher DO  Dx:   Encounter Diagnosis     ICD-10-CM    1. Acute CVA (cerebrovascular accident) (HCC)  I63.9               POC expires Auth Status Total   Visits  Start date  Expiration date PT/OT + Visit Limit? Co-Insurance   24     bomn                                                 Subjective: Pt reports to PT feeling okay. Reported his right hip pain really bothered him on Friday morning. Tried to switch beds and he said he felt better. He was also able to do the treadmill for about 10 mins over the weekend. Pt did report he skipped a few meds this morning as well, stating it is too hard to contact his doctors to approve get a dosage change.       Objective: See treatment diary below  *FLUID RESTRICTION- do not offer water*     Vitals taken at beginning of session:  - 144/78 mmHg (RUE seated)  Glucose level: 157    NMR:  -Side stepping with 10# TT- 2 mins on x 1   -Step ups 4\" step with foam pad- 15 x 2  -Cone negotiation with 5# TT switching hands with each lap- 5 laps down/back    -Progressed to 10# TT  Agility ladders with 10# TT   -Lateral ins and outs - 4 laps x 1   -Zig zags, in and outs to both sides - 3 laps x 1        Assessment: Patient tolerated treatment well today with decreased rest breaks needed for energy recovery. With step ups with foam pad, pt had a lateral LOB requiring modA from therapist to remain upright. Pt able to maintain balance with the remaining reps, utilizing a hip strategy when stepping onto the foam pad to maintain upright. With the agility ladder, pt able to perform different patterns with increased postural sway. Pt demonstrated improved turns with cone negotiation with no LOB throughout and supervision>CGA from therapist with increased postural sway. Pt demonstrated increased endurance as demonstrated by decreased time for rest breaks " in between each exercise. Patient would benefit from continued PT to improve balance, endurance, and reduce fall risk.         Plan: Continue per plan of care.  Gait belt throughout session. Continue to incorporate more balance activities on foam surfaces             Outcome Measures Initial Eval  1/30/24 Re-eval  2/20/24 PN  4/2/2024 RE  5/2/2024 RE   6/17/24     5xSTS 31.25 sec 52.20 sec no UE's    (1 LOB) 19.91 sec no UE 20.14 sec  No UE  15.84 sec  No UE     TUG 16.3 sec with rollator     23.6 sec no AD 20.68 sec with rollator     18.44 sec no AD 23.41 sec with rollator     12.92 sec no AD 19.69 sec with rollator    12.45 sec no AD  15.79 secs with rollator     11.54 secs no AD     10 meter   Next visit> 0.88 m/s 1.08 m/s Self selected:  .80 m/s  Fast: 1.29 m/s     MEDRANO   Next visit> 44/56 48/56 48/56     FGA 12/30 Next visit> 12/30 12/30 14/30     6MWT 900 ft 710 ft  Rollator  760 ft no  ft  No AD  885 ft no AD     mCTSIB  FTEO firm  FTEC firm  FTEO foam  FTEC foam    Next visit> 30 sec  30 sec  5.5 sec  defer 30 sec  30 sec  30 sec  1 sec 30 sec  30 sec  9 secs  Unable to maintain     ABC   65% 43.75% 56.88% 61.88%

## 2024-07-08 NOTE — PROGRESS NOTES
Daily Note     Today's date: 2024  Patient name: Mateus Mckeon  : 1985  MRN: [id number]  Referring provider: Dania Sher DO  Dx:   Encounter Diagnosis     ICD-10-CM    1. Cerebrovascular accident (CVA) due to other mechanism (HCC)  I63.89           Start Time: 0804  Stop Time: 0845  Total time in clinic (min): 41 minutes    PLAN OF CARE START: 24  PLAN OF CARE END: 2024  FREQUENCY: 2 times per week  PRECAUTIONS: Renal failure, actively going through dialysis; type 1 diabetes; HTN; SAH; seizure (last one was 2019)  ON FLUID RESTRICTIONS--DO NOT OFFER WATER    Subjective: Nothing new reported today    Objective: See treatment diary below  ON FLUID RESTRICTIONS--DO NOT OFFER WATER    TE:    -Bilateral green flex bar bends, 50x up and 50x down to address  strength, forearm strength.   -Forearm rotation 25x with hammer with focus on UE endurance, strengthening    NMR:  -Number/letter board alternating in ascending order with use of red resistive pinch pin and pt writing the name of a place that begins with each correlating letter.  Focus on tripod grasp strengthening, handwriting, 3 step direction following.  Requires mildly inc time for all aspects.  Handwriting 95% legible but large and sloppy.  Difficulty maintaining tripod grasp on writing utensil      Assessment: Pt tolerated session well today. Continues to demonstrate impaired R hand FMC, limited endurance. Trail tripod grasp writing utensil next session. Pt would benefit from continued skilled occupational therapy services to improve fine motor coordination, dexterity, stregnth, UE function, and functional cognition.    Plan: Continue per plan of care.     SHORT TERM GOALS ADDED 24:  Pt will increase sustained attention by completing trail making part A in 35 seconds to complete IADLs NOT MET  Pt will increase divided attention by completing trail making part B in 1 minute 30 seconds to complete IADLs NOT MET  Pt will  increase delayed recall and recall 4 /5 items on MOCA to complete IADLs GOAL MET    LONG TERM GOALS ADDED 2/20/24:  Pt will increase sustained attention by completing trail making part A in 38 seconds to complete IADLs  Pt will increase divided attention by completing trail making part B in 1 minute 10 seconds to complete IADLs  Pt will increase delayed recall and recall 5/5 items on MOCA to complete IADLs  Resume right  hand dominance to refined functional assist with all activities of daily living

## 2024-07-09 ENCOUNTER — HOSPITAL ENCOUNTER (OUTPATIENT)
Dept: NON INVASIVE DIAGNOSTICS | Facility: CLINIC | Age: 39
Discharge: HOME | End: 2024-07-09
Payer: MEDICARE

## 2024-07-09 VITALS — WEIGHT: 197 LBS | HEIGHT: 65 IN | BODY MASS INDEX: 32.82 KG/M2

## 2024-07-09 LAB
CHEST PAIN STATEMENT: NORMAL
MAX DIASTOLIC BP: 96 MMHG
MAX HR PERCENT: 52 %
MAX HR: 95 BPM
MAX PREDICTED HEART RATE: 180 BPM
NUC STRESS EJECTION FRACTION: 52 %
PROTOCOL NAME: NORMAL
RATE PRESSURE PRODUCT: 9500
REASON FOR TERMINATION: NORMAL
SL CV REST NUCLEAR ISOTOPE DOSE: 10.71 MCI
SL CV STRESS NUCLEAR ISOTOPE DOSE: 30.2 MCI
SL CV STRESS RECOVERY BP: NORMAL MMHG
SL CV STRESS RECOVERY HR: 72 BPM
SL CV STRESS RECOVERY O2 SAT: 98 %
STRESS ANGINA INDEX: 0
STRESS BASELINE BP: NORMAL MMHG
STRESS BASELINE HR: 69 BPM
STRESS O2 SAT REST: 98 %
STRESS PEAK HR: 95 BPM
STRESS POST ESTIMATED WORKLOAD: 1 METS
STRESS POST EXERCISE DUR MIN: 3 MIN
STRESS POST EXERCISE DUR SEC: 0 SEC
STRESS POST O2 SAT PEAK: 93 %
STRESS POST PEAK BP: 100 MMHG
STRESS POST PEAK HR: 95 BPM
STRESS POST PEAK SYSTOLIC BP: 144 MMHG
TARGET HR FORMULA: NORMAL
TEST INDICATION: NORMAL

## 2024-07-09 PROCEDURE — 78452 HT MUSCLE IMAGE SPECT MULT: CPT

## 2024-07-09 PROCEDURE — 78452 HT MUSCLE IMAGE SPECT MULT: CPT | Performed by: INTERNAL MEDICINE

## 2024-07-09 PROCEDURE — 93016 CV STRESS TEST SUPVJ ONLY: CPT | Performed by: INTERNAL MEDICINE

## 2024-07-09 PROCEDURE — 93017 CV STRESS TEST TRACING ONLY: CPT

## 2024-07-09 PROCEDURE — 93018 CV STRESS TEST I&R ONLY: CPT | Performed by: INTERNAL MEDICINE

## 2024-07-09 PROCEDURE — A9502 TC99M TETROFOSMIN: HCPCS

## 2024-07-09 RX ORDER — AMINOPHYLLINE 25 MG/ML
50 INJECTION, SOLUTION INTRAVENOUS ONCE
Status: COMPLETED | OUTPATIENT
Start: 2024-07-09 | End: 2024-07-09

## 2024-07-09 RX ORDER — REGADENOSON 0.08 MG/ML
0.4 INJECTION, SOLUTION INTRAVENOUS ONCE
Status: COMPLETED | OUTPATIENT
Start: 2024-07-09 | End: 2024-07-09

## 2024-07-09 RX ADMIN — REGADENOSON 0.4 MG: 0.08 INJECTION, SOLUTION INTRAVENOUS at 09:24

## 2024-07-09 RX ADMIN — AMINOPHYLLINE 50 MG: 25 INJECTION, SOLUTION INTRAVENOUS at 09:28

## 2024-07-10 ENCOUNTER — OFFICE VISIT (OUTPATIENT)
Dept: NEUROSURGERY | Facility: CLINIC | Age: 39
End: 2024-07-10
Payer: MEDICARE

## 2024-07-10 ENCOUNTER — TELEPHONE (OUTPATIENT)
Dept: NEPHROLOGY | Facility: CLINIC | Age: 39
End: 2024-07-10

## 2024-07-10 VITALS
SYSTOLIC BLOOD PRESSURE: 140 MMHG | RESPIRATION RATE: 16 BRPM | DIASTOLIC BLOOD PRESSURE: 80 MMHG | BODY MASS INDEX: 32.82 KG/M2 | OXYGEN SATURATION: 97 % | HEART RATE: 77 BPM | TEMPERATURE: 98.6 F | HEIGHT: 65 IN | WEIGHT: 197 LBS

## 2024-07-10 DIAGNOSIS — I60.8 NON-ANEURYSMAL PERIMESENCEPHALIC SUBARACHNOID HEMORRHAGE (HCC): ICD-10-CM

## 2024-07-10 DIAGNOSIS — I63.9 CEREBROVASCULAR ACCIDENT (CVA) OF LEFT PONTINE STRUCTURE (HCC): Primary | Chronic | ICD-10-CM

## 2024-07-10 DIAGNOSIS — I60.9 SUBARACHNOID HEMORRHAGE (HCC): Chronic | ICD-10-CM

## 2024-07-10 PROCEDURE — 99214 OFFICE O/P EST MOD 30 MIN: CPT | Performed by: NEUROLOGICAL SURGERY

## 2024-07-10 NOTE — TELEPHONE ENCOUNTER
----- Message from Maya Riley MD sent at 7/10/2024 10:12 AM EDT -----  Hello    Please let the patient know that his stress test had possible abnormality.  It was difficult to interpret though because they could not get prone imaging.  Please let the patient know that if he has any chest pain or discomfort, this goes always advised, that he should go to the nearest ER for evaluation.  Otherwise for now nothing different to do and we are reviewing with his cardiologist to see if he needs to repeat this test    Thank you

## 2024-07-10 NOTE — PROGRESS NOTES
Patient Id: Mateus Mckeon is a 38 y.o. 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  After 2 negative formal arteriograms and delayed noninvasive imaging I do not believe that there is any need to repeat any invasive or noninvasive imaging at this time.  I also explained that the risk of recurrent episodes of benign perimesencephalic hemorrhage is incredibly rare.  He may be at some increased risk of hypertensive hemorrhage due to his dialysis and variable blood pressures but I cannot quantify this.  We will continue to monitor him as needed.    2.  Stroke.  He has made a significant improvement in his right upper extremity with increased strength and dexterity.  He still has some difficulty with fine motor tasks but his overall core strength has significantly improved even compared to 3 months ago.  He is still doing physical therapy.  He would like to continue this.  In the future should they consider discharge from therapy but he would like to continue I would be happy to renew his prescription for this given his young age I do believe that he has significant opportunity and potential for further improvements.    3.  Right upper extremity swelling  This is fortunately resolved.     4.  Transplant candidacy.  As discussed last time.  I do not see any reason that this episode of hemorrhage should preclude him from transplant candidacy.  If there are any questions I would be happy to answer them    I have spent a total time of 30 minutes in caring for this patient on the day of the visit/encounter  including Diagnostic results, Prognosis, Risks and benefits of tx options, Instructions for management, Patient and family education, Importance of tx compliance, Risk factor reductions, Impressions, Counseling / Coordination of care, Documenting in the medical record, Reviewing / ordering tests, medicine, procedures  , and Obtaining or reviewing history  .       Chief Complaint: Follow-up      HPI:   This is a very pleasant 38-year-old gentleman with history of end-stage renal disease, hypertension, stroke, and who presented to the hospital in the beginning of January with headache after dialysis.  Evaluation demonstrated subarachnoid hemorrhage consistent with potential aneurysmal subarachnoid hemorrhage versus benign perimesencephalic hemorrhage.  Ultimately he underwent 2 formal angiograms as well as a multitude of noninvasive imaging studies.  No vascular etiology was found for his subarachnoid hemorrhage.     During his hospitalization, and after his second arteriogram, he was noted to have new weakness of his right upper extremity.  Evaluation was consistent with stroke and was confirmed with MRI.  At the time of his discharge to rehab he was plegic with his right hand with minimal movement or sensation.  Over the last 6-week he has had improvement with physical therapy and has regained some function though still complains of weakness.  He also complains of significant swelling in his arm as well as discomfort which occurs when he is at rest.  An extended period of time or during his dialysis appointments.  His pain has become severe during these appointments that he is not able to complete his entire dialysis treatment.    Review of systems obtained by the MA reviewed and updated below.      Review of Systems   Constitutional: Negative.    HENT:  Hearing loss: right ear sounds muffled- improved.    Eyes: Negative.    Respiratory:  Positive for apnea (does not use machine).    Cardiovascular: Negative.     Gastrointestinal:  Negative for constipation.   Endocrine: Negative.    Genitourinary:         Does not urinate due to dialysis   Musculoskeletal:  Positive for gait problem (unsteady uses walker, longevity improving).   Skin: Negative.    Allergic/Immunologic: Negative.    Neurological:  Positive for dizziness (daily), weakness (right  hand week improving) and numbness (both feet due to neuropathy right hand since procedure is improving). Negative for seizures (one seizure while in hospital from high blood pressure), facial asymmetry, speech difficulty (grealty improved) and headaches.   Hematological:  Bruises/bleeds easily (aspirin 81).   Psychiatric/Behavioral:  Positive for sleep disturbance (interrupted due to pain in right hand).        Physical Exam  Vitals:    07/10/24 0843   BP: 140/80   Pulse: 77   Resp: 16   Temp: 98.6 °F (37 °C)   SpO2: 97%   He is well appearing.  Affect is appropriate. His BMI is Body mass index is 32.78 kg/m².. He is awake alert and oriented.  Hearing and vision are grossly intact.   His pupils are equal round reactive to light.  His extraocular movements are intact.  His face is symmetric.  Tongue is midline.  Facial sensation is intact and symmetric throughout.  Shoulder shrug is 5/5.  There is no drift or dysmetria.    He has full strength in the left upper and lower extremity.  His right upper extremity has had a significant improvement in strength.  He is approximately 4 to 4+ in his deltoids, triceps and biceps.  His intrinsic hand and hand  are 4 -.  He does have the ability to write.  He ambulates smoothly with a rolling aid.    His heart rate is regular.  Normal respiratory effort.  Abdomen nondistended.  Radial pulses 2+.       The following portions of the patient's history were reviewed and updated as appropriate: allergies, current medications, past family history, past medical history, past social history, past surgical history, and problem list.    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,   01/28/2018    Binocular visual disturbance 01/28/2018    Hyperphosphatemia 01/29/2018    Vitamin D deficiency 01/29/2018    Homozygous MTHFR mutation [id number] 02/05/2018    Anemia in chronic kidney disease, on chronic dialysis ([facility]) 02/10/2018    Persistent proteinuria 02/11/2018    Ataxia 07/21/2015    Left atrial dilation 02/27/2018    Renovascular hypertension 03/26/2018    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 ([facility]) 06/04/2018    Dyslipidemia 08/24/2018    Strabismus 09/24/2018    Environmental and seasonal allergies 05/21/2019    Pruritus 09/06/2019    Gastroparesis diabeticorum  ([facility]) 09/17/2019    Multiple pulmonary nodules 12/09/2019    Vertigo 02/20/2020    Impaired mobility and ADLs 02/29/2020    Diabetic neuropathy ([facility]) 03/09/2020    Provoked seizure ([facility]) 03/09/2020    Asterixis 03/09/2020    Foot drop, bilateral 03/09/2020    Secondary hyperparathyroidism ([facility]) 10/23/2020    ESRD on hemodialysis ([facility]) 12/03/2020    Tubulovillous adenoma of colon 01/18/2021    Gastroesophageal reflux disease without esophagitis 01/18/2021    Medical cannabis use 04/15/2021    Generalized anxiety disorder 07/01/2021    Hypothyroidism 02/08/2022    Coronary artery disease involving native coronary artery of native heart without angina pectoris 04/22/2022    Status post placement of implantable loop recorder 05/12/2022    RACHEAL (obstructive sleep apnea)     Type 1 diabetes mellitus on insulin therapy ([facility]) 11/26/2023    Hyperlipidemia 11/26/2023    Insomnia 11/26/2023    Subarachnoid hemorrhage ([facility]) 01/05/2024    Cerebellar stroke syndrome 01/06/2022    Anemia in chronic kidney disease 01/06/2022    Anemia due to chronic kidney disease, on chronic dialysis ([facility]) 01/05/2024    Cerebrovascular accident (CVA) of  left pontine structure (HCC) 07/21/2015    Obesity (BMI 30.0-34.9) 09/09/2019    Arm paresthesia, right 03/07/2024    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) 04/03/2024    Swelling of joint of upper arm, right 04/03/2024    Moderate episode of recurrent major depressive disorder ([facility]) 04/15/2021     Resolved Ambulatory Problems     Diagnosis Date Noted    Cerebrovascular accident (CVA) of left pontine structure (HCC) 07/21/2015    Acute renal failure superimposed on stage 3 chronic kidney disease (HCC) 12/02/2015    Hypertensive urgency 07/21/2015    PONV (postoperative nausea and vomiting) 01/26/2018    Azotemia 01/29/2018    Hypertensive urgency 02/09/2018    Hyperkalemia 02/10/2018    Leukocytosis (leucocytosis) 02/10/2018    Acute kidney injury ([facility]) 02/11/2018    End-stage renal disease on peritoneal dialysis ([facility]) 02/11/2018    Bilateral leg edema 02/19/2018    Esophagitis 07/21/2015    Gastroenteritis 07/21/2015    Anemia 05/01/2018    Other constipation 08/09/2018    Peritoneal dialysis catheter in place ([facility]) 08/09/2018    Encounter for electrocardiogram 09/24/2018    Diarrhea 11/09/2018    Preop cardiovascular exam 03/05/2019    Current moderate episode of major depressive disorder without prior episode ([facility]) 03/08/2019    Intractable nausea and vomiting 03/25/2019    Acute otitis media 05/21/2019    Acute otitis externa of right ear 06/01/2019    Ear pain, right 06/05/2019    Wound infection 06/25/2019    Obesity (BMI 30.0-34.9) 09/09/2019    Peripheral polyneuropathy 11/20/2019    Hypothermia 12/07/2019    Elevated TSH 12/07/2019    Diabetic ketoacidosis associated with type 1 diabetes mellitus ([facility]) 12/09/2019    Metabolic encephalopathy 12/11/2019    Sepsis ([facility]) 12/11/2019    Status epilepticus ([facility]) 02/21/2020    Left arm pain 02/29/2020    Scalp cyst 03/12/2020    Headache 04/20/2020    End stage renal disease ([facility]) 02/11/2018    Recurrent peritonitis ([facility]) due to peritoneal  dialysis catheter 07/31/2020    Verbalizes suicidal thoughts 09/08/2020    Gastroparesis 09/30/2020    Spontaneous bacterial peritonitis (HCC) 10/19/2020    Chronic diarrhea 10/20/2020    Emesis 10/24/2020    Orthostatic hypotension 10/25/2020    LUQ abdominal pain 11/04/2020    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    Abdominal pain 11/13/2020    Protein-calorie malnutrition (HCC) 11/23/2020    Cellulitis of right elbow 03/31/2021    Numbness of arm 07/01/2021    Mild episode of recurrent major depressive disorder (HCC) 07/01/2021    Pulmonary HTN (HCC) 04/22/2022    Hypoglycemia 07/15/2022    Mastoiditis of right side 07/15/2022    Traumatic onycholysis 07/21/2022    Bilateral leg edema 02/19/2018    Abnormal finding on MRI of brain 01/09/2024    Abdominal pain, acute, epigastric 01/25/2024     Past Medical History:   Diagnosis Date    Ambulates with cane     Anuria     Anxiety     Chronic kidney disease     Depression     Diabetes mellitus (HCC)     Falls     GERD (gastroesophageal reflux disease)     History of shingles 2010    History of transfusion 02/2018    Hypertension     Itching     Muscle weakness     Retinopathy     Seizures (HCC)     Skin abnormality     Squamous cell skin cancer     Stroke (HCC)     Swelling of both lower extremities     Vomiting     Wears glasses        Past Surgical History:   Procedure Laterality Date    CARDIAC ELECTROPHYSIOLOGY PROCEDURE N/A 9/21/2023    Procedure: Cardiac loop recorder explant;  Surgeon: Parish Morgan MD;  Location:  CARDIAC CATH LAB;  Service: Cardiology    CARDIAC LOOP RECORDER  05/2018    COLONOSCOPY      EGD      EYE SURGERY Right     IR AV FISTULAGRAM/GRAFTOGRAM  02/23/2021    IR CEREBRAL ANGIOGRAPHY  1/12/2024    IR CEREBRAL ANGIOGRAPHY / INTERVENTION  1/5/2024    IR TUNNELED CENTRAL LINE PLACEMENT  02/16/2021    IR TUNNELED DIALYSIS CATHETER PLACEMENT  11/18/2020    IR TUNNELED DIALYSIS CATHETER REMOVAL  02/12/2021    IR TUNNELED DIALYSIS CATHETER  REMOVAL  03/11/2021    MOHS SURGERY Left 12/14/2022    Left temple with Dr. Hassan    PERITONEAL CATHETER INSERTION N/A 08/27/2018    Procedure: UNROOF PD CATHETER;  Surgeon: Felipe Lindo DO;  Location: [facility];  Service: General    TX ARTERIOVENOUS ANASTOMOSIS OPEN DIRECT Left 11/09/2020    Procedure: CREATION FISTULA  ARTERIOVENOUS (AV) - LEFT WRIST;  Surgeon: Placido Altamirano MD;  Location: [facility];  Service: Vascular    TX ESOPHAGOGASTRODUODENOSCOPY TRANSORAL DIAGNOSTIC N/A 04/18/2019    Procedure: ESOPHAGOGASTRODUODENOSCOPY (EGD);  Surgeon: Ale Figueroa MD;  Location: [facility];  Service: Gastroenterology    TX LAPS INSERTION TUNNELED INTRAPERITONEAL CATHETER N/A 08/06/2018    Procedure: LAPAROSCOPIC PD CATHETER PLACEMENT;  Surgeon: Felipe Lindo DO;  [facility];  Service: General    TX REMOVAL TUNNELED INTRAPERITONEAL CATHETER N/A 11/18/2020    Procedure: REMOVAL CATHETER PERITONEAL DIALYSIS;  Surgeon: Abdifatah Ty MD;  Location: [facility];  Service: General    TONSILLECTOMY      UPPER GASTROINTESTINAL ENDOSCOPY           Current Outpatient Medications:     aspirin (ECOTRIN LOW STRENGTH) 81 mg EC tablet, Take 81 mg by mouth daily, Disp: , Rfl:     atorvastatin (LIPITOR) 40 mg tablet, Take 1 tablet (40 mg total) by mouth every evening, Disp: 90 tablet, Rfl: 3    b complex vitamins capsule, Take 1 capsule by mouth daily before lunch , Disp: , Rfl:     calcium acetate (PHOSLO) capsule, Take 1 capsule (667 mg total) by mouth 3 (three) times a day, Disp: 90 capsule, Rfl: 0    cinacalcet (SENSIPAR) 60 MG tablet, Take 1 tablet (60 mg total) by mouth daily, Disp: 30 tablet, Rfl: 0    escitalopram (LEXAPRO) 20 mg tablet, Take 1 tablet (20 mg total) by mouth daily, Disp: 90 tablet, Rfl: 2    famotidine (PEPCID) 40 MG tablet, TAKE 1 TABLET BY MOUTH IN THE MORNING, Disp: 90 tablet, Rfl: 1    gabapentin (NEURONTIN) 100 mg capsule, TAKE1 CAPSULES BY MOUTH AT BEDTIME (Patient taking differently: 100 mg 2  (two) times a day TAKE1 CAPSULES BY MOUTH AT BEDTIME), Disp: 90 capsule, Rfl: 3    labetalol (NORMODYNE) 200 mg tablet, Take 2 tablets (400 mg total) by mouth 3 (three) times a day, Disp: 180 tablet, Rfl: 0    lisinopril (ZESTRIL) 40 mg tablet, Take 1 tablet (40 mg total) by mouth daily, Disp: 30 tablet, Rfl: 0    metoclopramide (REGLAN) 5 mg tablet, Take 1 tablet by mouth three times daily as needed, Disp: 90 tablet, Rfl: 0    minoxidil (LONITEN) 2.5 mg tablet, Take 2 tablets (5 mg total) by mouth 2 (two) times a day, Disp: 120 tablet, Rfl: 0    NIFEdipine (PROCARDIA XL) 30 mg 24 hr tablet, Take 2 tablets (60 mg total) by mouth 2 (two) times a day (Patient taking differently: Take 60 mg by mouth daily), Disp: 120 tablet, Rfl: 0    traZODone (DESYREL) 50 mg tablet, TAKE 1/2 (ONE-HALF) TABLET BY MOUTH ONCE DAILY AT BEDTIME AS NEEDED FOR SLEEP, Disp: 30 tablet, Rfl: 2    cloNIDine (CATAPRES-TTS-3) 0.3 mg/24 hr, 1 patch once a week (Patient not taking: Reported on 5/17/2024), Disp: , Rfl:     GLUCAGON EMERGENCY 1 MG injection, , Disp: , Rfl: 3    Gvoke HypoPen 1-Pack 1 MG/0.2ML SOAJ, , Disp: , Rfl:     hydrALAZINE (APRESOLINE) 100 MG tablet, Take 1 tablet (100 mg total) by mouth 3 (three) times a day (Patient not taking: Reported on 7/10/2024), Disp: 90 tablet, Rfl: 0    hydrOXYzine HCL (ATARAX) 10 mg tablet, Take 1 tablet (10 mg total) by mouth every 6 (six) hours as needed for itching or anxiety (Patient not taking: Reported on 7/10/2024), Disp: 30 tablet, Rfl: 3    Insulin Disposable Pump (Omnipod 5 G6 Intro, Gen 5,) KIT, , Disp: , Rfl:     Insulin Disposable Pump (Omnipod 5 G6 Pod, Gen 5,) MISC, , Disp: , Rfl:     NovoLOG 100 UNIT/ML injection, , Disp: , Rfl:     ondansetron (ZOFRAN) 4 mg tablet, Take 1 tablet (4 mg total) by mouth every 8 (eight) hours as needed for nausea or vomiting, Disp: 90 tablet, Rfl: 1    Patiromer Sorbitex Calcium (VELTASSA PO), Take 5 g by mouth once a week (Patient not taking: Reported  on 7/10/2024), Disp: , Rfl:     saccharomyces boulardii (FLORASTOR) 250 mg capsule, Take 250 mg by mouth daily, Disp: , Rfl:     SPS 15 GM/60ML suspension, TAKE 60 ML BY MOUTH SINGLE DOSE FOR EMERGENCY USE DURING MISSED HEMODIALYSIS, Disp: , Rfl:   No current facility-administered medications for this visit.    Results/Data: Prior imaging reviewed in detail and discussed.

## 2024-07-11 ENCOUNTER — EVALUATION (OUTPATIENT)
Facility: CLINIC | Age: 39
End: 2024-07-11
Payer: MEDICARE

## 2024-07-11 ENCOUNTER — OFFICE VISIT (OUTPATIENT)
Facility: CLINIC | Age: 39
End: 2024-07-11
Payer: MEDICARE

## 2024-07-11 DIAGNOSIS — I63.89 CEREBROVASCULAR ACCIDENT (CVA) DUE TO OTHER MECHANISM (HCC): Primary | ICD-10-CM

## 2024-07-11 DIAGNOSIS — I63.9 ACUTE CVA (CEREBROVASCULAR ACCIDENT) (HCC): Primary | ICD-10-CM

## 2024-07-11 PROCEDURE — 97168 OT RE-EVAL EST PLAN CARE: CPT

## 2024-07-11 PROCEDURE — 97112 NEUROMUSCULAR REEDUCATION: CPT

## 2024-07-11 NOTE — PROGRESS NOTES
"OCCUPATIONAL THERAPY RE-EVALUATION    Today's Date: 2024  Patient Name: Mateus Mckeon  : 1985  MRN: [id number]  Referring Provider: Dania Sher,   Dx: Cerebrovascular accident (CVA) due to other mechanism (HCC) [I63.89]      SKILLED ANALYSIS:  Pt presents to re-evaluation on 24 after ~4 months of OT s/p CVA.  Pt reports he notes improved R grasp strength, ROM, UE strength, stamina during daily activities and handwriting.  Based on assessments, pt is demonstrating improved b/l grasp strength, R UE ROM, functional cognition, dexterity and FMC.  Pt continues to function below normal limits in the following domains: b/l UE strength R>L, endurance, FMC R>L, dexterity R>L, R grasp strength, higher level functional cognition.  Recommend continued participation in OP OT 2x/wk for an additional 4-8 weeks with continued focus on aforementioned deficits to maximize functional performance and QOL.  Pt requesting we begin to work on tying his shoes, as he currently requires assist from his parents to complete.  POC expiration date extended 1 month to reflect recommendations.  Discussed progress and recommendations with pt and he is in agreement.    Subjective:   : Pt states he feels like his endurance is not where it needs to be. He has been attempting to use his hand as much as he can, however does not see much progress - if any thing he states \"I feel like I'm losing ground\". Pt states he did start using the theraputty and continued using foam to work on hand strength. Pt states he is not able to keep up with her exercises on days that he has dialysis due to fatigue.     : Reports inc endurance during daily activities, but still fatigues quicker than baseline.  Able to complete zippers and buttons with inc time, requires assist for tying shoes.  Now able to write his initials and complete basic handwriting with ~80% legibility, significantly inc time and inc sizing    PLAN OF CARE " "START: 5/23/24  PLAN OF CARE END: 9/23/2024 (extended as of 7/11)  FREQUENCY: 2x/wk  PRECAUTIONS: Renal failure, actively going through dialysis; type 1 diabetes; HTN; SAH; seizure (last one was 2019)    Subjective    Mechanism of Injury  Bill is a 38-year-old male with history of end-stage renal disease on dialysis, hypertension, type 1 diabetes, and recent subarachnoid hemorrhage   Pt reports he went to the hospital and they found a brain bleed and reported he needed an angioplasty. Unfortunately, during the angioplasty he suffered a stroke that affected his R UE. Since his stroke he reports his R UE function has improved although he continues with R hand FMC/dexterity deficits.   Overall, pt requires assistance with 75% of his daily activities due to complex medical presentation as well as increased fatigue post-stroke.   \"There's nothing more like purgatory than using your non dominant side.\"    From Hospital note on 1/14/24:   38-year-old man with prior history of cerebellar and pontine strokes, prothrombin gene mutation, MTHFR gene mutation, diabetes, diabetic nephropathy with ESRD on HD, who presented with headache on 1/5 and was found to have subarachnoid hemorrhage in the basilar cisterns.  Unclear etiology.  Patient family reports nausea and retching around the time of headache.     - Underwent conventional angiogram later that day which was unremarkable for any clear source.  - MRI brain on 1/8 demonstrated multiple punctate foci of ischemia in the right MCA, bilateral cerebellar hemispheres, and right velia which may represent sequela of angiograph versus embolic strokes of unclear source (ESUS)   -Repeat angiogram on 1/12 was again unremarkable for source of subarachnoid.     Neurology consulted for right upper extremity weakness noted after second angiogram.  - Repeat MRI demonstrated numerous new embolic appearing foci of ischemia with similar suspected etiology as those mentioned above.   " "  Transcranial Dopplers have been unremarkable with Lindegaard ratios consistently less than 3.     Spontaneous basilar cistern subarachnoid hemorrhage of yet unclear etiology.      Embolic appearing strokes, suspect complication of angiogram however cannot exclude additional embolic source.    Occupational Profile  Pt lives in a home with his parents; has one flight to bedroom and has AD throughout the house (grab bars etc.). Paulo reports he needs help with preparing meals (could get by making 1 meal for himself but not all day), laundry, and driving. He is independent with dressing and bathing, \"but it takes it out of me. I could do some things but I need help throughout the day.\"    It is of note that the pt is actively getting dialysis; has to sit still for 4 hours  Pt reports some numbness in fingers on left side (L sided forearm is where dialysis is put in) and notes difficulty with thinking and memory since stroke. No changes in vision     He enjoys spending time with nieces and nephews and would love to get a dog.    PATIENT GOAL: \"I want to be able to tie my shoes without having to ask my parents\"    HISTORY OF PRESENT ILLNESS:     PMH:   Past Medical History:   Diagnosis Date    Acute kidney injury (HCC)     Ambulates with cane     Anuria     Anxiety     Cellulitis of right elbow 03/31/2021    Chronic kidney disease     Depression     Diabetes mellitus (HCC)     Diarrhea     Emesis 10/24/2020    End stage renal disease (HCC) 02/11/2018    Formatting of this note might be different from the original. Last Assessment & Plan:  Secondary to DM.  On nightly PD.  Followed by Ne[facility].  [facility] Formatting of this note might be different from the original. Last Assessment & Plan:  Formatting of this note might be different from the original. 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    Esophagitis 07/21/2015    Falls  "    Gastroparesis     GERD (gastroesophageal reflux disease)     History of shingles 2010    History of transfusion 02/2018    no adverse reaction    Hyperlipidemia     Hyperphosphatemia     Hypertension     Hypoglycemia 07/15/2022    Itching     Mastoiditis of right side 07/15/2022    Muscle weakness     general unsteadiness    Obesity (BMI 30.0-34.9) 09/09/2019    Orthostatic hypotension 10/25/2020    Peripheral polyneuropathy 11/20/2019    PONV (postoperative nausea and vomiting) 01/26/2018    Protein-calorie malnutrition (HCC) 11/23/2020    Recurrent peritonitis (HCC) due to peritoneal dialysis catheter 07/31/2020    Retinopathy     Seizures (HCC)     early 2020 - one time    Skin abnormality     some dime size areas where skin was scratched from itching    Spontaneous bacterial peritonitis (HCC) 10/19/2020    Squamous cell skin cancer     left temple    Stroke (HCC)     x2 - off balance/no driving/fatigue    Swelling of both lower extremities     Traumatic onycholysis 07/21/2022    Vomiting     Wears glasses        Past Surgical Hx:   Past Surgical History:   Procedure Laterality Date    CARDIAC ELECTROPHYSIOLOGY PROCEDURE N/A 9/21/2023    Procedure: Cardiac loop recorder explant;  Surgeon: Parish Morgan MD;  Location: [facility];  Service: Cardiology    CARDIAC LOOP RECORDER  05/2018    COLONOSCOPY      EGD      EYE SURGERY Right     IR AV FISTULAGRAM/GRAFTOGRAM  02/23/2021    IR CEREBRAL ANGIOGRAPHY  1/12/2024    IR CEREBRAL ANGIOGRAPHY / INTERVENTION  1/5/2024    IR TUNNELED CENTRAL LINE PLACEMENT  02/16/2021    IR TUNNELED DIALYSIS CATHETER PLACEMENT  11/18/2020    IR TUNNELED DIALYSIS CATHETER REMOVAL  02/12/2021    IR TUNNELED DIALYSIS CATHETER REMOVAL  03/11/2021    MOHS SURGERY Left 12/14/2022    Left temple with Dr. Hassan    PERITONEAL CATHETER INSERTION N/A 08/27/2018    Procedure: UNROOF PD CATHETER;  Surgeon: Felipe Lindo DO;  Location: [facility];  Service: General    RI ARTERIOVENOUS  ANASTOMOSIS OPEN DIRECT Left 11/09/2020    Procedure: CREATION FISTULA  ARTERIOVENOUS (AV) - LEFT WRIST;  Surgeon: Placido Altamirano MD;  Location: [facility];  Service: Vascular    AR ESOPHAGOGASTRODUODENOSCOPY TRANSORAL DIAGNOSTIC N/A 04/18/2019    Procedure: ESOPHAGOGASTRODUODENOSCOPY (EGD);  Surgeon: Ale Figueroa MD;  Location: [facility];  Service: Gastroenterology    AR LAPS INSERTION TUNNELED INTRAPERITONEAL CATHETER N/A 08/06/2018    Procedure: LAPAROSCOPIC PD CATHETER PLACEMENT;  Surgeon: Felipe Lindo DO;  Location[facility];  Service: General    AR REMOVAL TUNNELED INTRAPERITONEAL CATHETER N/A 11/18/2020    Procedure: REMOVAL CATHETER PERITONEAL DIALYSIS;  Surgeon: Abdifatah Ty MD;  [facility];  Service: General    TONSILLECTOMY      UPPER GASTROINTESTINAL ENDOSCOPY          Pain: FLACC 0     Objective    Upper Extremities:  Pt is right hand dominant    Range of Motion:  AROM:   R UE   - Shoulder flexion: 170* (previously 130*)  -Shoulder abduction: 160*  - Shoulder extension: WNL  - Elbow flexion WNL  - Elbow extension: WNL  - Wrist flexion: WNL  - Wrist extension: WNL  - Pronation: WNL  - Supination: 95% AROM  - Finger extension:  Almost full, limited by tight flexors  - Finger flexion: weak grasp but ROM WNL    L UE : 100%     Manual Muscle Testing:  R UE:  - Shoulder flexors: 5/5  - Shoulder extensors: 5/5  - Shoulder abductors: 4+/5   - Shoulder external rotators: 4+/5   - Shoulder internal rotators: 5/5  - Elbow flexors: 5/5  - Elbow extensors: 4/5  - Wrist flexors: 4+/5  - Wrist extensors: 4+/5  L UE:  - Shoulder flexors: 5/5  - Shoulder extensors: 5/5  - Shoulder abductors: 5/5   - Shoulder external rotators: 5/5   - Shoulder internal rotators: 5/5  - Elbow flexors: 5/5  - Elbow extensors: 5/5  - Wrist flexors: 5/5  - Wrist extensors: 5/5                  ANGEL: RUE: 34lb (15lb) LUE: 100lb  The age norm is approximately R: 116.8;bs and L: 112.8 lbs, indicating decreased   strength..                 2 point pinch: RUE: 6lb (previously 4lb), LUE: 10.5lb (previously 12lb) (age norms are 18 lbs)  3 point pinch: RUE: 10.5lb (previously 7lb), LUE: 20lb (previously 10lb) (age norms are 24 lbs)  Lateral pinch: RUE: 14lb (previously 8), LUE: 21lb (previously 21lb) (age norms are 25 lbs)                  NINE-HOLE PEG TEST: assesses dexterity/fine motor coordination   R hand: 1:04 with 2 pegs dropped seconds (previously 1 min 16 second)  L hand: 47 seconds (previously: 38.84 seconds)  Pt demonstrates decreased FMC compared to norms for age/sex (L: 18.62 seconds and R: 17.71 seconds)     Functional Dexterity Test: assesses patient's ability to use the hand for daily tasks requiring a 3-jaw federico prehension between the fingers and the thumb. Completed in a zig-zag pattern with unaffected UE first  5-second penalty (each):  -Supinating forearm to assist  -Touching the board for assist    10-second penalty:  -Dropping a peg    R UE: 1:59 with 4 compensations and 6 pegs dropped = 199 seconds (previously 2 minutes and 53 second +6 supinations, 3 board touches, and 3 drops (adding 75 seconds) = 248 seconds)   L UE:  44.4 seconds (previously: 58.39 seconds)     Norms based on age/sex: (Lucy et. al., 2013)    Age Group Sex Non-Dominant (50th percentile) Dominant (50th percentile)   20-49 Male 23.2 24.1     Pt performance on Functional Dexterity Test implies non functional hand compared to norms for age/sex.    Score by Functional Level Range (dominant hand) Range (non-dominant hand)   Functional  16-25 seconds 18-27 seconds   Moderately Functional  26-33 seconds 28-45 seconds   Minimally Functional 34-50 seconds 46-55 seconds   Nonfunctional >50 seconds >55 seconds     Subluxation: NONE    Scapular winging: MINIMAL    FUGYL JOHNS ASSESSMENT OF MOTOR RECOVERY AFTER STROKE: NOT ASSESSED 7/11  R UE:  UPPER EXTREMITY SEATED: 36   WRIST: 7/10   HAND: 12/14   COORDINATION/SPEED: 4/6   OVERALL SCORE:  59/66   (Clinical change= 5 points, severe impairment <19, and mild impairment is >50)         Functional Cognition:  Highest level of education: some college     [place] Cognitive Assessment Version 8.3 (MoCA V8.3)  Visuospatial/executive functionin/5  Naming:  3/3  Memory: 1st trial:  , 2nd trial:    Attention/concentration: 2/2  List of letters:   Serial Seven Subtraction:  3/3 w/ 0 errors  Language/sentence repetition: 12  Language Fluency: 0/1 (previously: 0  Abstract/Correlational Thinkin/2   Delayed Recall:   Orientation:                Memory Index Score: 13/15 (previously: 11/15)  MoCA V1 8.3 Raw Score:  (previously: ), MIS:  13/15, indicative of no neurocognitive impairments.     MoCA Scoring              Normal: 26+              Mild Cognitive Impairment: 18-25               Moderate Cognitive Impairment: 10-17              Severe Cognitive Impairment: <10     Trail making Part A and Part B:  Part A: 54 seconds I'ly (previously 1 minute and 2 seconds independently)  Part B: 1:28 I'ly (previously 1 minute and 53.92 seconds independently)  Indicating deficits: Part A > 24.40 seconds and Part B > 50.68 seconds       Contextual Memory Test:   Immediate:  (previously 10/20, initially 3/20)  Delayed:   (previously 10/20, initially 3/20)      GOALS:   Short Term Goals:  Pt will increase R UE  strength to 18 lbs to complete IADLs ACHIEVED   Pt will increase R FMC by being able to don 5 pegs in to 9 hole peg board in 1 minute and 40 seconds on 9 hole peg to complete ADLs and IADLs GOAL MET   Pt will increase  R UE strength to complete  of hand section of fugyl benavides for tabletop tasks ACHIEVED 24  Pt will increase  R UE strength to complete /6 of coordination section of fugyl benavides for tabletop tasks ACHIEVED 24        Long Term Goals: 8-12 weeks  Pt will increase R UE  strength to 25 lbs to complete IADLs ACHIEVED 24  Pt will increase R  FMC by being able to don all 9 pegs in to 9 hole peg board in 3 minutes on 9 hole peg to complete ADLs and IADLs GOAL MET   Pt will increase  R UE strength to complete 10/14 of hand section of fugyl benavides for tabletop tasks ACHIEVED 5/23/24  Pt will increase  R UE strength to complete 5/6 of coordination section of fugyl benavides for tabletop tasks PROGRESSING    New Goals as 5/23/24  Pt will demonstrate improved  strength as evidenced by increased ANGEL dynamometer to 40 lbs for improved performance in ADLs/IADLs ACHIEVED   Pt will demonstrate improved lateral pinch strength as evidenced by increase to 6 lbs for improved performance in ADLs/IADLs ACHIEVED  Pt will demonstrate improved three point pinch strength as evidenced by increase to 9 lbs for improved performance in ADLs/IADLs ACHIEVED  Pt will demonstrate improved tip pinch strength as evidenced by increase to 10 lbs for improved performance in ADLs/IADLs PROGRESSING  Pt will demonstrate improved FMC required for ADLs/IADLs as demonstrated by completing 9-hole peg test within 1 minute and 5 seconds PARTIALLY ACHIEVED, WITHIN THE TIME FRAME BUT 2 PEGS DROPPED  Pt will demonstrate improved dexterity required for ADLs/IADLs as demonstrated by completing FDT within 200 seconds, including penalties. ACHIEVED    UPDATED GOALS AS OF 7/11:  Pt will demonstrate improved  strength as evidenced by increased ANGEL dynamometer to 52 lbs for improved performance in ADLs/IADLs     Pt will demonstrate improved dexterity required for ADLs/IADLs as demonstrated by completing FDT within 180 seconds, including penalties.     OTHER PLANNED THERAPY INTERVENTIONS:   Supine, seated, and in stance neuro re-ed  Tricep AG  NMES/FES  FMC/prehension  Timed Trials  Manual tx  Hand to target  Sensory re-ed  Seated functional reach: crossing midline  Supine place and hold  WBearing strategies   Closed chain activities  Open chain activities  Internal and external memory  kareenes        Objective Measurement Tracker:    IE (2/15/24) 1st Re-eval:   (3/19/24) 2nd Re-eval:  (4/22/24) 3rd Re-eval:     MoCA 24/30 NOT TODAY/30 23/30 Not reassessed /30   TM Test A   To be completed next session Not reassessed    TM Test B   To be completed next session Not reassessed    CMT Immediate: 3/20, 0 confabulations  Delayed: 3/20, 0 confabulations NOT TODAY To be completed next session Not reassessed    Nine Hole Peg Test R hand: 3 pegs in 1 minute and 48.49 seconds   L hand: 35.14 seconds   R hand: all 9 pegs in and out in 1 minute and 36 seconds    L hand: 39.59 seconds  R hand: all 9 pegs in and out in 1 min. 31 secs. With 2 drops. L hand: 38.84 seconds. 1 min 16 seconds    Functional Dexterity Test NOT TODAY R UE: 2 minutes and 6 seconds to complete 8 with compensatory strategies including the board and 1 peg drop  L UE: 45 seconds  R UE: 3 mins. 50 secs. To complete with 7 drops/  compensations.   L UE: 58.39 secs. 258 seconds     Strength R: 15lb,   L: 100lb R: 15lbs  L: 100lbs  R: 15 lbs  L: 100 lbs 35 lbs    Lateral Pinch Strength NOT TODAY  Not today   8 lbs    2 Point Pinch Strength NOT TODAY   Not today   4 lbs     3 Point Pinch Strength NOT TODAY   Not today  7 lbs     Manual Muscle Testing             Fugl Harrison UE: 36/36  Wrist: 10/10  Hand: 6/14  Coordination: 2/6 NOT TODAY  UE: 28/36  Wrist: 7/10  Hand: 10/14  Coordination:  3/6 UE: 36  Wrist: 7/10  Hand: 12/14  Coordination:   4/6  Overall 59/66

## 2024-07-11 NOTE — PROGRESS NOTES
"Daily Note     Today's date: 2024  Patient name: Mateus Mckeon  : 1985  MRN: [id number]  Referring provider: Dania Sher DO  Dx:   Encounter Diagnosis     ICD-10-CM    1. Acute CVA (cerebrovascular accident) (HCC)  I63.9                 POC expires Auth Status Total   Visits  Start date  Expiration date PT/OT + Visit Limit? Co-Insurance   24     bomn                                                 Subjective: Patient presents to PT stating he has been feeling good overall, denies any new changes, complaints, or falls.     Objective: See treatment diary below  *FLUID RESTRICTION- do not offer water*     Vitals taken at beginning of session:  BP: 136/87 mmHg (RUE seated)  HR: 71 bpm  SpO2: 97%    NMR:  - Overground ambulation w/ 10# TT-   - Side stepping with 10# TT- 2 mins on x 1   - Step ups 4-6\" step from foam pad- 15 x 2, 0UE  - Bwd step to foam pad: 12x  - Agility ladder w/ 2.5# BL ankle wts +10# TT: 2.5 laps    Assessment: Patient tolerated treatment well today with continued focus on functional mobility and aerobic exercises. Included uneven and and compliant surfaces this date to further challenge patients somatosensory awareness. Patient displayed frequent retropulsion which he was able to recover from with Iveth. Limited upper extremity support throughout session to further emphasize lower extremity balance strategies. Patient would benefit from continued PT to improve balance, endurance, and reduce fall risk.     Plan: Continue per plan of care.  Gait belt throughout session. Continue to incorporate more balance activities on foam surfaces             Outcome Measures Initial Eval  24 Re-eval  24 PN  2024 RE  2024 RE   24     5xSTS 31.25 sec 52.20 sec no UE's    (1 LOB) 19.91 sec no UE 20.14 sec  No UE  15.84 sec  No UE     TUG 16.3 sec with rollator     23.6 sec no AD 20.68 sec with rollator     18.44 sec no AD 23.41 sec with rollator     12.92 sec no AD " 19.69 sec with rollator    12.45 sec no AD  15.79 secs with rollator     11.54 secs no AD     10 meter   Next visit> 0.88 m/s 1.08 m/s Self selected:  .80 m/s  Fast: 1.29 m/s     MEDRANO   Next visit> 44/56 48/56 48/56     FGA 12/30 Next visit> 12/30 12/30 14/30     6MWT 900 ft 710 ft  Rollator  760 ft no  ft  No AD  885 ft no AD     mCTSIB  FTEO firm  FTEC firm  FTEO foam  FTEC foam    Next visit> 30 sec  30 sec  5.5 sec  defer 30 sec  30 sec  30 sec  1 sec 30 sec  30 sec  9 secs  Unable to maintain     ABC   65% 43.75% 56.88% 61.88%

## 2024-07-15 ENCOUNTER — OFFICE VISIT (OUTPATIENT)
Facility: CLINIC | Age: 39
End: 2024-07-15
Payer: MEDICARE

## 2024-07-15 ENCOUNTER — EVALUATION (OUTPATIENT)
Facility: CLINIC | Age: 39
End: 2024-07-15
Payer: MEDICARE

## 2024-07-15 DIAGNOSIS — I63.89 CEREBROVASCULAR ACCIDENT (CVA) DUE TO OTHER MECHANISM (HCC): Primary | ICD-10-CM

## 2024-07-15 DIAGNOSIS — I63.9 ACUTE CVA (CEREBROVASCULAR ACCIDENT) (HCC): Primary | ICD-10-CM

## 2024-07-15 PROCEDURE — 97530 THERAPEUTIC ACTIVITIES: CPT

## 2024-07-15 PROCEDURE — 97112 NEUROMUSCULAR REEDUCATION: CPT

## 2024-07-15 NOTE — PROGRESS NOTES
"Daily Note     Today's date: 7/15/2024  Patient name: Mateus Mckeon  : 1985  MRN: [id number]  Referring provider: Dania Sher DO  Dx:   Encounter Diagnosis     ICD-10-CM    1. Cerebrovascular accident (CVA) due to other mechanism (HCC)  I63.89         Start Time: 0800  Stop Time: 0845  Total time in clinic (min): 45 minutes    PLAN OF CARE START: 24  PLAN OF CARE END: 2024  FREQUENCY: 2 times per week  PRECAUTIONS: Renal failure, actively going through dialysis; type 1 diabetes; HTN; SAH; seizure (last one was 2019)  ON FLUID RESTRICTIONS--DO NOT OFFER WATER    Subjective: Nothing new reported today    Objective: See treatment diary below  ON FLUID RESTRICTIONS--DO NOT OFFER WATER    NMR:  -3x10 sets of standing push ups to provide proprioceptive input thru R UE while working on UE strength  -3x10 standing PNF D2 flexion with blue Theraband UE strength with shoulder flexion, scap retraction, external rotation  -3x10 standing PNF D1 extension with blue Theraband for UE strength, with shoulder extension, scap retraction, scap depression, elbow extension  -Lacing pillow with R hand while holding pillow with L hand to work on FMC, bilateral coordination required for shoe tying.     TA:   -Hand exerciser with 4 rubber bands for resistance, picking up 3/4\" blocks and reaching out anteriorly to place cubes into target. Activity focused on grasp/release,  strength, R UE endurance with reaching.    Assessment: Pt tolerated session well today. Continues to demonstrate impaired R hand FMC, limited endurance. Pt would benefit from continued skilled occupational therapy services to improve fine motor coordination, dexterity, stregnth, UE function, and functional cognition.    Plan: Continue per plan of care.     SHORT TERM GOALS ADDED 24:  Pt will increase sustained attention by completing trail making part A in 35 seconds to complete IADLs NOT MET  Pt will increase divided attention by " completing trail making part B in 1 minute 30 seconds to complete IADLs NOT MET  Pt will increase delayed recall and recall 4 /5 items on MOCA to complete IADLs GOAL MET    LONG TERM GOALS ADDED 2/20/24:  Pt will increase sustained attention by completing trail making part A in 38 seconds to complete IADLs  Pt will increase divided attention by completing trail making part B in 1 minute 10 seconds to complete IADLs  Pt will increase delayed recall and recall 5/5 items on MOCA to complete IADLs  Resume right  hand dominance to refined functional assist with all activities of daily living

## 2024-07-15 NOTE — PROGRESS NOTES
PT Re-Evaluation          POC expires Auth Status Total   Visits  Start date  Expiration date PT/OT + Visit Limit? Co-Insurance   5/14/24         8/6/24         10/7/24                                        Today's date: 7/15/2024  Patient name: Mateus Mckeon  : 1985  MRN: [id number]  Referring provider: Dania Sher DO  Dx:   Encounter Diagnosis     ICD-10-CM    1. Acute CVA (cerebrovascular accident) (HCC)  I63.9                 Assessment  Assessment details: Patient is a 38 y.o. Male who has been presenting to skilled outpatient PT s/p CVA. He has PMH of ESRD on dialysis and has also had 2 prior CVA's. Since his last re-evaluation patient has made significant improvements in lower extremity strength, functional mobility, and cardiovascular endurance as per 5x STS, TUG, and 6MWT respectively. Minor improvements also displayed in dual tasking, dynamic balance, and static balance as per TUG cog, FGA, and mCTSIB respectively. He is most challenged while on compliant surfaces and when visual field is occluded likely secondary to HIGH visual reliance. Slight regression displayed in MEDRANO the date suggesting limitations in static balance which is further reflected in ABC score this date. He remians at increased risk for fall per FGA score as per APTA and Rehab Measures cutoff values. He has met 2 additional goals thus far and is progressing towards remaining. Plan to continue with HIGT and HIIT with incorporation of balance activities on uneven and complaint surfaces to reduce overall risk for falls. Patient will continue to benefit from skilled PT to achieve goals below and maximize function post CVA.   Impairments: Abnormal coordination, Abnormal gait, Abnormal muscle tone, Abnormal or restricted ROM, Activity intolerance, Impaired balance, Impaired physical strength, Lacks appropriate HEP, Poor posture, Poor body mechanics, Pain  with function, Safety issue, Weight-bearing intolerance, Abnormal movement, Difficulty understanding, Abnormal muscle firing  Understanding of Dx/Px/POC: Good  Prognosis: Good      Patient verbalized understanding of POC.         Please contact me if you have any questions or recommendations. Thank you for the referral and the opportunity to share in Mateus Mckeon's care.        Plan  Plan details: PT 2-3x/week   Patient would benefit from: Skilled PT  Planned modality interventions: Biofeedback, Cryotherapy, TENS, Thermotherapy  Planned therapy interventions: Abdominal trunk stabilization, ADL training, Balance, Balance/WB training, Breathing training, Body mechanics training, Coordination, Functional ROM exercises, Gait training, HEP, Joint Mobilization, Manual Therapy, Ma taping, Motor coordination training, Neuromuscular re-education, Patient education, Postural training, Strengthening, Stretching, Therapeutic activities, Therapeutic exercises, Therapeutic training, Transfer training, Activity modification, Work reintegration  Frequency: 2-3x/wk  Duration in weeks: 12 weeks  Plan of Care beginning date: 7/15/2024  Plan of Care expiration date: 12 weeks - 10/7/2024  Treatment plan discussed with: Patient         Goals  Short Term Goals (4 weeks):    Patient will improve 6MWT by 100ft w/LRAD demonstrating significant improvements in endurance  w/in 4 weeks - MET  Patient will be independent with HEP within 4 weeks - NOT MET  Patient will be able to ambulate household distances (100ft) or greater with LRAD  within 4 weeks - MET (doesn't use rollator in the house)  Patient will demonstrated improved LE strength and endurance by improved 5xSTS to 30 sec or less within 4 weeks - MET  Patient will perform Callejas to further assess static balance and fall risk. - MET  Patient will perform 10 meter walk test to further assess gait speed and fall risk. - MET  Patient will improve ABC to 80% to demonstrate  increased balance confidence and reduced fall risk. - NOT MET      Long Term Goals (12 weeks):  - Patient will be independent in a comprehensive home exercise program- NOT MET  - Patient will improve gait speed to 1.0 m/s to improve safety with community ambulation - MET  - Patient will improve MEDRANO by 6 points to facilitate return to safe independent ambulation- NOT MET  - Patient will improve scoring on FGA by 4 points to progress safety with dynamic tasks- NOT MET  - Patient will improve 6 Minute Walk Test score by 190 feet to promote improved cardiovascular endurance- MET   - Patient will report going on walks at least 3 days per week to promote independence and improved cardiovascular endurance- NOT MET  - Patient will be able to ascend/descend stairs reciprocally with 1 UE assist to promote independence and safety with ADLs- NOT MET  - Patient will demonstrated improved LE strength and endurance by improved 5xSTS to 13 sec or less - MET        Cut off score    All date taken from APTA Neuro Section or Rehab Measures      Medrano:  Siva et al., 2018  MDC: 2.7 pts    She et al., 2011  Cut-off score: 45/56    Chronic CVA  < 44/56 high risk for falls (Siva et al., 2018)  < 47.5/56 slow walker status (Jus et al., 2011) 5xSTS: Jean Carlos 2010  MDC: 2.3 sec  Age Norms:  60-69: 11.1 sec  70-79: 12.6 sec  80-89: 14.8 sec    Yamileth 2010, Chronic Stroke  Chronic CVA: 12 sec   TUG  Nubia et al., 2005  MDC: 2.9 sec    Cut off score:  >13.5 sec community dwelling adults  >32.2 frail elderly  <20 I for basic transfers  >30 dependent on transfers 10 Meter Walk Test:   Talia et al., 2006  Small meaningful change: 0.06 m/s  Substantial meaningful change: 0.14 m/s  MCID: 0.16 m/s    < 0.4 m/s household ambulators  0.4 - 0.8 m/s limited community ambulators  > 0.8 m/s community ambulators   FGA: Deepika et al., 2010  MCID: 4.2 pts  Geriatrics/community < 22/30 fall risk  Geriatrics/community < 20/30 unexplained falls  "DGI  MDC: vestibular - 4 pts  MDC: geriatric/community - 3 pts  Falls risk <19/24   6 Minute Walk Test  Talia et al., 2006  MDC: 60.98 m (200.01 ft)    Verónica Norwood, & Ally, 2012  MCID: 34.4 m    Age Norms  60-69: M - 1876 ft   F - 1765 ft  70-79: M - 1729 ft   F - 1545 ft  80-89: M - 1368 ft   F - 1286 ft Modified Joel  0: No increase in tone  1: Catch and release or min resistance at end range  1+: Catch f/b min resistance throughout remainder (< half ROM)  2: Easily moved, but more marked tone throughout most ROM  3: Significant tone, PROM difficult  4: Rigid   MiniBest: Glenda et al., 2013  CVA < 17.5 fall risk Pass (Acute CVA)  MDC: 1.8 points (acute), 3.2 points (chronic)         Subjective    History of Present Illness  Mechanism of injury: Pt had CVA end of January (3rd stroke). He also has ESRD and goes to dialysis 3 days/week. He mostly uses rollator but at home he furniture walks. His parents need to assist him more now than prior to stroke. He likes to spend time with nieces and nephews and attend their sporting events.     Updated (4/2/2024): Patient reports overall satisfaction with PT services thus far, feels his stamina has gotten better. He wants to continue to work on this so that he has \"the energy to do things.\"    Update (5/2/2024): Pt reports he feels like his stamina is actually getting worse. He has not been as diligent as should be with using his treadmill at home. Overall feeling more tired, not much energy. Denies any falls, but still experiences occasional loss of balance.     Update (6/17/24): Pt reports he feels his stamina is still decreased, but he feels he is slowly improving with balance. He reports no new falls at home since last re-eval.    Updated 7/17/24: Patient states he feels therapy is going well; he states his main goal is to improve his stamina which he states has noticed is progressing although he would like to continue to fortify this.     Primary AD: rollator (PLOF he " mostly used SPC and only used rollator for long distances like going to nephew's soccer game)   Assist level at home: lives with parents who are assisting with ADL's and IADL's, but he is transferring and walking independently.   WC usage: none  PLOF: using SPC mostly, still required some assist from parents for ADL's and IADL's   Patient goals: to walk with SPC again, work on balance, build up walking endurance     Pain  Pain in both feet due to neuropathy     Social Support  Steps to enter house: 2 NIRU  Stairs in house: full flight to 2nd floor (able to perform independently)    Lives in: 2 story home   Lives with: parents    Employment status: disabled   Hand dominance: right    Treatments  Previous treatment: PT [address]  Current treatment: PT, OT  Diagnostic Testing:       Objective     Vitals  - HR: 79 bpm  - BP: 140/70 mmHg   - SPO2: 97%    LE MMT  - R Hip Flexion: 5/5  - L Hip Flexion: 5/5  - R Hip Extension: 5/5  - L Hip Extension: 5/5  - R Hip Abduction: 5/5  - L Hip abduction: 5/5  - R Hip adduction: 5/5  - L Hip adduction: 5/5  - R Knee Extension: 5/5 - L Knee Extension: 5/5  - R Knee Flexion: 5/5  - L Knee Flexion: 5/5  - R Ankle DF: 5/5  - L Ankle DF: 5/5  - R Ankle PF: 5/5  - L Ankle PF: 5/5    Sensation  - Light touch: neuropathy both feet up to mid calf   - Temperature: diminished     Coordination  - Alternating Toe Taps: impaired  - Heel to shin: impaired     Clonus  - L: Yes  - R: Yes  - Fatiguing: Yes  - Number of beats: 1-2 beats    Vision  - Glasses: Yes  - Abnormalities prior to CVA: Yes, retinopathy (gets shots every 3 months)  - Abnormalities post CVA: No,     Neglect  - R sided: No  - L sided: No    Gait  Wide COLBY, ataxic, decreased step length    Vitals:  HR: 75 bpm  SpO2: 99%  BP: 159/90 mmHg      Outcome Measures Initial Eval  1/30/24 Re-eval  2/20/24 PN  4/2/2024 RE  5/2/2024 RE   6/17/24 RE  7/15/24   5xSTS 31.25 sec 52.20 sec no UE's    (1 LOB) 19.91 sec no UE 20.14 sec  No UE   15.84 sec  No UE 11.34 sec  No UE   TUG 16.3 sec with rollator    23.6 sec no AD 20.68 sec with rollator    18.44 sec no AD 23.41 sec with rollator    12.92 sec no AD 19.69 sec with rollator    12.45 sec no AD  15.79 secs with rollator    11.54 secs no AD 14.37 sec, w/ rollator      10.62 sec, no AD   10 meter  Next visit> 0.88 m/s 1.08 m/s Self selected:  .80 m/s  Fast: 1.29 m/s 1.16 self selected pace   MEDRANO  Next visit> 44/56 48/56 48/56 47/56   FGA 12/30 Next visit> 12/30 12/30 14/30 16/30   6MWT 900 ft 710 ft  Rollator  760 ft no  ft  No AD  885 ft no AD 1050 ft no AD   mCTSIB  FTEO firm  FTEC firm  FTEO foam  FTEC foam   Next visit> 30 sec  30 sec  5.5 sec  defer 30 sec  30 sec  30 sec  1 sec 30 sec  30 sec  9 secs  Unable to maintain 30 sec  30 sec  30 sec +  3 sec   ABC  65% 43.75% 56.88% 61.88% 58.75%        Precautions: Check BP's RUE only (fistula LUE)   Past Medical History:   Diagnosis Date    Acute kidney injury (HCC)     Ambulates with cane     Anuria     Anxiety     Cellulitis of right elbow 03/31/2021    Chronic kidney disease     Depression     Diabetes mellitus (HCC)     Diarrhea     Emesis 10/24/2020    End stage renal disease (HCC) 02/11/2018    Formatting of this note might be different from the original. Last Assessment & Plan:  Secondary to DM.  On nightly PD.  Followed by [facility].  [facility] Formatting of this note might be different from the original. Last Assessment & Plan:  Formatting of this note might be different from the original. 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    Esophagitis 07/21/2015    Falls     Gastroparesis     GERD (gastroesophageal reflux disease)     History of shingles 2010    History of transfusion 02/2018    no adverse reaction    Hyperlipidemia     Hyperphosphatemia     Hypertension     Hypoglycemia 07/15/2022    Itching     Mastoiditis of right side  07/15/2022    Muscle weakness     general unsteadiness    Obesity (BMI 30.0-34.9) 09/09/2019    Orthostatic hypotension 10/25/2020    Peripheral polyneuropathy 11/20/2019    PONV (postoperative nausea and vomiting) 01/26/2018    Protein-calorie malnutrition (HCC) 11/23/2020    Recurrent peritonitis ([facility]) due to peritoneal dialysis catheter 07/31/2020    Retinopathy     Seizures ([facility])     early 2020 - one time    Skin abnormality     some dime size areas where skin was scratched from itching    Spontaneous bacterial peritonitis ([facility]) 10/19/2020    Squamous cell skin cancer     left temple    Stroke ([facility])     x2 - off balance/no driving/fatigue    Swelling of both lower extremities     Traumatic onycholysis 07/21/2022    Vomiting     Wears glasses

## 2024-07-16 ENCOUNTER — APPOINTMENT (OUTPATIENT)
Dept: LAB | Facility: CLINIC | Age: 39
End: 2024-07-16
Payer: MEDICARE

## 2024-07-16 ENCOUNTER — HOSPITAL ENCOUNTER (OUTPATIENT)
Dept: NON INVASIVE DIAGNOSTICS | Facility: HOSPITAL | Age: 39
Discharge: HOME | End: 2024-07-16
Attending: INTERNAL MEDICINE
Payer: MEDICARE

## 2024-07-16 ENCOUNTER — HOSPITAL ENCOUNTER (EMERGENCY)
Facility: HOSPITAL | Age: 39
Discharge: HOME | End: 2024-07-16
Attending: EMERGENCY MEDICINE
Payer: MEDICARE

## 2024-07-16 ENCOUNTER — APPOINTMENT (OUTPATIENT)
Dept: NON INVASIVE DIAGNOSTICS | Facility: HOSPITAL | Age: 39
End: 2024-07-16
Attending: INTERNAL MEDICINE
Payer: MEDICARE

## 2024-07-16 VITALS
OXYGEN SATURATION: 99 % | SYSTOLIC BLOOD PRESSURE: 154 MMHG | DIASTOLIC BLOOD PRESSURE: 96 MMHG | HEART RATE: 73 BPM | RESPIRATION RATE: 20 BRPM | TEMPERATURE: 97.9 F

## 2024-07-16 DIAGNOSIS — Z15.89 MTHFR MUTATION: ICD-10-CM

## 2024-07-16 DIAGNOSIS — N18.6 END STAGE RENAL DISEASE (HCC): ICD-10-CM

## 2024-07-16 DIAGNOSIS — R56.9 UNSPECIFIED CONVULSIONS (HCC): ICD-10-CM

## 2024-07-16 DIAGNOSIS — I63.9 CEREBRAL INFARCTION, UNSPECIFIED (HCC): ICD-10-CM

## 2024-07-16 DIAGNOSIS — E10.649 HYPOGLYCEMIA DUE TO TYPE 1 DIABETES MELLITUS (HCC): Primary | ICD-10-CM

## 2024-07-16 DIAGNOSIS — I63.9 IMPENDING CEREBROVASCULAR ACCIDENT (HCC): ICD-10-CM

## 2024-07-16 DIAGNOSIS — E08.42 DIABETES MELLITUS DUE TO UNDERLYING CONDITION WITH DIABETIC POLYNEUROPATHY (HCC): ICD-10-CM

## 2024-07-16 DIAGNOSIS — E08.42 DIABETES MELLITUS DUE TO UNDERLYING CONDITION WITH DIABETIC POLYNEUROPATHY, UNSPECIFIED WHETHER LONG TERM INSULIN USE (HCC): ICD-10-CM

## 2024-07-16 DIAGNOSIS — R91.1 INCIDENTAL LUNG NODULE: ICD-10-CM

## 2024-07-16 DIAGNOSIS — R56.9 PROVOKED SEIZURE (HCC): ICD-10-CM

## 2024-07-16 DIAGNOSIS — D63.0 ANEMIA IN NEOPLASTIC DISEASE: ICD-10-CM

## 2024-07-16 DIAGNOSIS — D68.52 PROTHROMBIN GENE MUTATION (HCC): ICD-10-CM

## 2024-07-16 DIAGNOSIS — Z99.2 ENCOUNTER FOR EXTRACORPOREAL DIALYSIS (HCC): ICD-10-CM

## 2024-07-16 LAB
AFP-TM SERPL-MCNC: 1.85 NG/ML (ref 0–9)
ALBUMIN SERPL BCG-MCNC: 4.6 G/DL (ref 3.5–5)
ALP SERPL-CCNC: 104 U/L (ref 34–104)
ALT SERPL W P-5'-P-CCNC: 13 U/L (ref 7–52)
ANION GAP SERPL CALCULATED.3IONS-SCNC: 8 MMOL/L (ref 4–13)
APTT PPP: 35 SECONDS (ref 23–37)
AST SERPL W P-5'-P-CCNC: 20 U/L (ref 13–39)
BASOPHILS # BLD AUTO: 0.09 THOUSANDS/ÂΜL (ref 0–0.1)
BASOPHILS NFR BLD AUTO: 1 % (ref 0–1)
BILIRUB SERPL-MCNC: 0.66 MG/DL (ref 0.2–1)
BUN SERPL-MCNC: 22 MG/DL (ref 5–25)
CALCIUM SERPL-MCNC: 9.2 MG/DL (ref 8.4–10.2)
CHLORIDE SERPL-SCNC: 94 MMOL/L (ref 96–108)
CHOLEST SERPL-MCNC: 111 MG/DL
CO2 SERPL-SCNC: 37 MMOL/L (ref 21–32)
CREAT SERPL-MCNC: 7.21 MG/DL (ref 0.6–1.3)
DEPRECATED AT III PPP: 89 % OF NORMAL (ref 92–136)
EOSINOPHIL # BLD AUTO: 0.48 THOUSAND/ÂΜL (ref 0–0.61)
EOSINOPHIL NFR BLD AUTO: 8 % (ref 0–6)
ERYTHROCYTE [DISTWIDTH] IN BLOOD BY AUTOMATED COUNT: 13.4 % (ref 11.6–15.1)
EST. AVERAGE GLUCOSE BLD GHB EST-MCNC: 131 MG/DL
GFR SERPL CREATININE-BSD FRML MDRD: 8 ML/MIN/1.73SQ M
GLUCOSE P FAST SERPL-MCNC: 155 MG/DL (ref 65–99)
HAV AB SER QL IA: NORMAL
HBA1C MFR BLD: 6.2 %
HBV CORE AB SER QL: NORMAL
HBV SURFACE AB SER-ACNC: >500 MIU/ML
HBV SURFACE AG SER QL: NORMAL
HCT VFR BLD AUTO: 39.8 % (ref 36.5–49.3)
HDLC SERPL-MCNC: 42 MG/DL
HGB BLD-MCNC: 13.2 G/DL (ref 12–17)
HIV 1+2 AB+HIV1 P24 AG SERPL QL IA: NORMAL
HIV 2 AB SERPL QL IA: NORMAL
HIV1 AB SERPL QL IA: NORMAL
HIV1 P24 AG SERPL QL IA: NORMAL
IMM GRANULOCYTES # BLD AUTO: 0.02 THOUSAND/UL (ref 0–0.2)
IMM GRANULOCYTES NFR BLD AUTO: 0 % (ref 0–2)
INR PPP: 0.97 (ref 0.84–1.19)
LDLC SERPL CALC-MCNC: 56 MG/DL (ref 0–100)
LYMPHOCYTES # BLD AUTO: 1.28 THOUSANDS/ÂΜL (ref 0.6–4.47)
LYMPHOCYTES NFR BLD AUTO: 20 % (ref 14–44)
MAGNESIUM SERPL-MCNC: 2.7 MG/DL (ref 1.9–2.7)
MCH RBC QN AUTO: 31.7 PG (ref 26.8–34.3)
MCHC RBC AUTO-ENTMCNC: 33.2 G/DL (ref 31.4–37.4)
MCV RBC AUTO: 95 FL (ref 82–98)
MEV IGG SER QL IA: NORMAL
MONOCYTES # BLD AUTO: 0.32 THOUSAND/ÂΜL (ref 0.17–1.22)
MONOCYTES NFR BLD AUTO: 5 % (ref 4–12)
MUV IGG SER QL IA: ABNORMAL
NEUTROPHILS # BLD AUTO: 4.09 THOUSANDS/ÂΜL (ref 1.85–7.62)
NEUTS SEG NFR BLD AUTO: 66 % (ref 43–75)
NONHDLC SERPL-MCNC: 69 MG/DL
NRBC BLD AUTO-RTO: 0 /100 WBCS
PHOSPHATE SERPL-MCNC: 4.9 MG/DL (ref 2.7–4.5)
PLATELET # BLD AUTO: 244 THOUSANDS/UL (ref 149–390)
PMV BLD AUTO: 10.3 FL (ref 8.9–12.7)
POTASSIUM SERPL-SCNC: 4.9 MMOL/L (ref 3.5–5.3)
PROT SERPL-MCNC: 7.2 G/DL (ref 6.4–8.4)
PROTHROMBIN TIME: 13.4 SECONDS (ref 11.6–14.5)
PSA SERPL-MCNC: 0.25 NG/ML (ref 0–4)
RBC # BLD AUTO: 4.17 MILLION/UL (ref 3.88–5.62)
RUBV IGG SERPL IA-ACNC: 69.5 IU/ML
SODIUM SERPL-SCNC: 139 MMOL/L (ref 135–147)
TREPONEMA PALLIDUM IGG+IGM AB [PRESENCE] IN SERUM OR PLASMA BY IMMUNOASSAY: NORMAL
TRIGL SERPL-MCNC: 64 MG/DL
VZV IGG SER QL IA: NORMAL
WBC # BLD AUTO: 6.28 THOUSAND/UL (ref 4.31–10.16)

## 2024-07-16 PROCEDURE — 86803 HEPATITIS C AB TEST: CPT

## 2024-07-16 PROCEDURE — 99285 EMERGENCY DEPT VISIT HI MDM: CPT | Performed by: EMERGENCY MEDICINE

## 2024-07-16 PROCEDURE — 85610 PROTHROMBIN TIME: CPT

## 2024-07-16 PROCEDURE — 83735 ASSAY OF MAGNESIUM: CPT

## 2024-07-16 PROCEDURE — G0103 PSA SCREENING: HCPCS

## 2024-07-16 PROCEDURE — 84100 ASSAY OF PHOSPHORUS: CPT

## 2024-07-16 PROCEDURE — 80053 COMPREHEN METABOLIC PANEL: CPT

## 2024-07-16 PROCEDURE — 86735 MUMPS ANTIBODY: CPT

## 2024-07-16 PROCEDURE — 86147 CARDIOLIPIN ANTIBODY EA IG: CPT

## 2024-07-16 PROCEDURE — 86480 TB TEST CELL IMMUN MEASURE: CPT

## 2024-07-16 PROCEDURE — 86765 RUBEOLA ANTIBODY: CPT

## 2024-07-16 PROCEDURE — 80061 LIPID PANEL: CPT

## 2024-07-16 PROCEDURE — 85306 CLOT INHIBIT PROT S FREE: CPT

## 2024-07-16 PROCEDURE — 93925 LOWER EXTREMITY STUDY: CPT

## 2024-07-16 PROCEDURE — 93923 UPR/LXTR ART STDY 3+ LVLS: CPT

## 2024-07-16 PROCEDURE — 93880 EXTRACRANIAL BILAT STUDY: CPT

## 2024-07-16 PROCEDURE — 85303 CLOT INHIBIT PROT C ACTIVITY: CPT

## 2024-07-16 PROCEDURE — 86787 VARICELLA-ZOSTER ANTIBODY: CPT

## 2024-07-16 PROCEDURE — 93925 LOWER EXTREMITY STUDY: CPT | Performed by: SURGERY

## 2024-07-16 PROCEDURE — 85025 COMPLETE CBC W/AUTO DIFF WBC: CPT

## 2024-07-16 PROCEDURE — 93880 EXTRACRANIAL BILAT STUDY: CPT | Performed by: SURGERY

## 2024-07-16 PROCEDURE — 86708 HEPATITIS A ANTIBODY: CPT

## 2024-07-16 PROCEDURE — 86706 HEP B SURFACE ANTIBODY: CPT

## 2024-07-16 PROCEDURE — 86704 HEP B CORE ANTIBODY TOTAL: CPT

## 2024-07-16 PROCEDURE — 85300 ANTITHROMBIN III ACTIVITY: CPT

## 2024-07-16 PROCEDURE — 83036 HEMOGLOBIN GLYCOSYLATED A1C: CPT

## 2024-07-16 PROCEDURE — 86780 TREPONEMA PALLIDUM: CPT

## 2024-07-16 PROCEDURE — 99284 EMERGENCY DEPT VISIT MOD MDM: CPT

## 2024-07-16 PROCEDURE — 80307 DRUG TEST PRSMV CHEM ANLYZR: CPT

## 2024-07-16 PROCEDURE — 36415 COLL VENOUS BLD VENIPUNCTURE: CPT

## 2024-07-16 PROCEDURE — 84681 ASSAY OF C-PEPTIDE: CPT

## 2024-07-16 PROCEDURE — 86645 CMV ANTIBODY IGM: CPT

## 2024-07-16 PROCEDURE — 85730 THROMBOPLASTIN TIME PARTIAL: CPT

## 2024-07-16 PROCEDURE — 87340 HEPATITIS B SURFACE AG IA: CPT

## 2024-07-16 PROCEDURE — 86663 EPSTEIN-BARR ANTIBODY: CPT

## 2024-07-16 PROCEDURE — 86146 BETA-2 GLYCOPROTEIN ANTIBODY: CPT

## 2024-07-16 PROCEDURE — 86644 CMV ANTIBODY: CPT

## 2024-07-16 PROCEDURE — 86762 RUBELLA ANTIBODY: CPT

## 2024-07-16 PROCEDURE — 82105 ALPHA-FETOPROTEIN SERUM: CPT

## 2024-07-16 PROCEDURE — 93922 UPR/L XTREMITY ART 2 LEVELS: CPT | Performed by: SURGERY

## 2024-07-16 PROCEDURE — 86038 ANTINUCLEAR ANTIBODIES: CPT

## 2024-07-16 PROCEDURE — 87389 HIV-1 AG W/HIV-1&-2 AB AG IA: CPT

## 2024-07-16 PROCEDURE — 86682 HELMINTH ANTIBODY: CPT

## 2024-07-16 NOTE — ED PROVIDER NOTES
History  Chief Complaint   Patient presents with    Medical Problem     Was a medical emergency, BS was found to be 61 per dexcom pt went unresponsive.      38-year-old male with a history of T1DM, ESRD on HD MWF, HTN, HLD, and prior stroke who presents for evaluation of altered mental status after he became unresponsive while undergoing outpatient vascular testing.  The patient reports that his continuous glucose monitor alarmed that his blood glucose was low.  The patient states that he ignored the alarm as he wanted to complete the testing.  The patient then develop symptoms including lightheadedness, diaphoresis, and nausea.  The patient was given a cookie which she was able to eat.  A medical emergency was called and the patient was transported to the ED.  The patient states that since arriving in the emergency department he is feeling much better.  The patient's continuous glucose monitor shows that his blood sugar dropped to 60 but is now at 90.  The patient uses an insulin pump.  The patient reports that he did not have much to eat today as he has been busy.  The patient denies fever, chills, chest pain, shortness of breath, abdominal pain, diarrhea, dysuria.        Prior to Admission Medications   Prescriptions Last Dose Informant Patient Reported? Taking?   GLUCAGON EMERGENCY 1 MG injection  Self Yes No   Gvoke HypoPen 1-Pack 1 MG/0.2ML SOAJ  Self Yes No   Insulin Disposable Pump (Omnipod 5 G6 Intro, Gen 5,) KIT  Self Yes No   Insulin Disposable Pump (Omnipod 5 G6 Pod, Gen 5,) MISC  Self Yes No   NIFEdipine (PROCARDIA XL) 30 mg 24 hr tablet  Self No No   Sig: Take 2 tablets (60 mg total) by mouth 2 (two) times a day   Patient taking differently: Take 60 mg by mouth daily   NovoLOG 100 UNIT/ML injection  Self Yes No   Patiromer Sorbitex Calcium (VELTASSA PO)  Self Yes No   Sig: Take 5 g by mouth once a week   Patient not taking: Reported on 7/10/2024   SPS 15 GM/60ML suspension  Self Yes No   Sig: TAKE 60  ML BY MOUTH SINGLE DOSE FOR EMERGENCY USE DURING MISSED HEMODIALYSIS   aspirin (ECOTRIN LOW STRENGTH) 81 mg EC tablet  Self Yes No   Sig: Take 81 mg by mouth daily   atorvastatin (LIPITOR) 40 mg tablet  Self No No   Sig: Take 1 tablet (40 mg total) by mouth every evening   b complex vitamins capsule  Self Yes No   Sig: Take 1 capsule by mouth daily before lunch    calcium acetate (PHOSLO) capsule  Self No No   Sig: Take 1 capsule (667 mg total) by mouth 3 (three) times a day   cinacalcet (SENSIPAR) 60 MG tablet  Self No No   Sig: Take 1 tablet (60 mg total) by mouth daily   cloNIDine (CATAPRES-TTS-3) 0.3 mg/24 hr  Self Yes No   Si patch once a week   Patient not taking: Reported on 2024   escitalopram (LEXAPRO) 20 mg tablet  Self No No   Sig: Take 1 tablet (20 mg total) by mouth daily   famotidine (PEPCID) 40 MG tablet  Self No No   Sig: TAKE 1 TABLET BY MOUTH IN THE MORNING   gabapentin (NEURONTIN) 100 mg capsule  Self No No   Sig: TAKE1 CAPSULES BY MOUTH AT BEDTIME   Patient taking differently: 100 mg 2 (two) times a day TAKE1 CAPSULES BY MOUTH AT BEDTIME   hydrALAZINE (APRESOLINE) 100 MG tablet  Self No No   Sig: Take 1 tablet (100 mg total) by mouth 3 (three) times a day   Patient not taking: Reported on 7/10/2024   hydrOXYzine HCL (ATARAX) 10 mg tablet  Self No No   Sig: Take 1 tablet (10 mg total) by mouth every 6 (six) hours as needed for itching or anxiety   Patient not taking: Reported on 7/10/2024   labetalol (NORMODYNE) 200 mg tablet  Self No No   Sig: Take 2 tablets (400 mg total) by mouth 3 (three) times a day   lisinopril (ZESTRIL) 40 mg tablet  Self No No   Sig: Take 1 tablet (40 mg total) by mouth daily   metoclopramide (REGLAN) 5 mg tablet  Self No No   Sig: Take 1 tablet by mouth three times daily as needed   minoxidil (LONITEN) 2.5 mg tablet  Self No No   Sig: Take 2 tablets (5 mg total) by mouth 2 (two) times a day   ondansetron (ZOFRAN) 4 mg tablet  Self No No   Sig: Take 1 tablet (4 mg  total) by mouth every 8 (eight) hours as needed for nausea or vomiting   saccharomyces boulardii (FLORASTOR) 250 mg capsule  Self Yes No   Sig: Take 250 mg by mouth daily   traZODone (DESYREL) 50 mg tablet  Self No No   Sig: TAKE 1/2 (ONE-HALF) TABLET BY MOUTH ONCE DAILY AT BEDTIME AS NEEDED FOR SLEEP      Facility-Administered Medications: None       Past Medical History:   Diagnosis Date    Acute kidney injury (HCC)     Ambulates with cane     Anuria     Anxiety     Cellulitis of right elbow 03/31/2021    Chronic kidney disease     Depression     Diabetes mellitus (HCC)     Diarrhea     Emesis 10/24/2020    End stage renal disease (HCC) 02/11/2018    Formatting of this note might be different from the original. Last Assessment & Plan:  Secondary to DM.  On nightly PD.  Followed by [facility].  [facility] Formatting of this note might be different from the original. Last Assessment & Plan:  Formatting of this note might be different from the original. 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    Esophagitis 07/21/2015    Falls     Gastroparesis     GERD (gastroesophageal reflux disease)     History of shingles 2010    History of transfusion 02/2018    no adverse reaction    Hyperlipidemia     Hyperphosphatemia     Hypertension     Hypoglycemia 07/15/2022    Itching     Mastoiditis of right side 07/15/2022    Muscle weakness     general unsteadiness    Obesity (BMI 30.0-34.9) 09/09/2019    Orthostatic hypotension 10/25/2020    Peripheral polyneuropathy 11/20/2019    PONV (postoperative nausea and vomiting) 01/26/2018    Protein-calorie malnutrition (HCC) 11/23/2020    Recurrent peritonitis (HCC) due to peritoneal dialysis catheter 07/31/2020    Retinopathy     Seizures ([facility])     early 2020 - one time    Skin abnormality     some dime size areas where skin was scratched from itching    Spontaneous bacterial peritonitis (HCC)  10/19/2020    Squamous cell skin cancer     left temple    Stroke (HCC)     x2 - off balance/no driving/fatigue    Swelling of both lower extremities     Traumatic onycholysis 07/21/2022    Vomiting     Wears glasses        Past Surgical History:   Procedure Laterality Date    CARDIAC ELECTROPHYSIOLOGY PROCEDURE N/A 9/21/2023    Procedure: Cardiac loop recorder explant;  Surgeon: Parish Morgan MD;  Location: [facility];  Service: Cardiology    CARDIAC LOOP RECORDER  05/2018    COLONOSCOPY      EGD      EYE SURGERY Right     IR AV FISTULAGRAM/GRAFTOGRAM  02/23/2021    IR CEREBRAL ANGIOGRAPHY  1/12/2024    IR CEREBRAL ANGIOGRAPHY / INTERVENTION  1/5/2024    IR TUNNELED CENTRAL LINE PLACEMENT  02/16/2021    IR TUNNELED DIALYSIS CATHETER PLACEMENT  11/18/2020    IR TUNNELED DIALYSIS CATHETER REMOVAL  02/12/2021    IR TUNNELED DIALYSIS CATHETER REMOVAL  03/11/2021    MOHS SURGERY Left 12/14/2022    Left temple with Dr. Hassan    PERITONEAL CATHETER INSERTION N/A 08/27/2018    Procedure: UNROOF PD CATHETER;  Surgeon: Felipe Lindo DO;  Location: [facility];  Service: General    GA ARTERIOVENOUS ANASTOMOSIS OPEN DIRECT Left 11/09/2020    Procedure: CREATION FISTULA  ARTERIOVENOUS (AV) - LEFT WRIST;  Surgeon: Placido Altamirano MD;  Location: [facility];  Service: Vascular    GA ESOPHAGOGASTRODUODENOSCOPY TRANSORAL DIAGNOSTIC N/A 04/18/2019    Procedure: ESOPHAGOGASTRODUODENOSCOPY (EGD);  Surgeon: Ale Figueroa MD;  Location: [facility] GI LAB;  Service: Gastroenterology    GA LAPS INSERTION TUNNELED INTRAPERITONEAL CATHETER N/A 08/06/2018    Procedure: LAPAROSCOPIC PD CATHETER PLACEMENT;  Surgeon: Felipe Lindo DO;  Location: [facility];  Service: General    GA REMOVAL TUNNELED INTRAPERITONEAL CATHETER N/A 11/18/2020    Procedure: REMOVAL CATHETER PERITONEAL DIALYSIS;  Surgeon: Abdifatah Ty MD;  Location: [facility];  Service: General    TONSILLECTOMY      UPPER GASTROINTESTINAL ENDOSCOPY         Family History    Problem Relation Age of Onset    Breast cancer Mother     Hypertension Mother     Hyperlipidemia Father     Hypertension Father     Leukemia Maternal Grandmother     Hyperlipidemia Maternal Grandfather     Hypertension Maternal Grandfather     Hyperlipidemia Paternal Grandmother     Hypertension Paternal Grandmother     Heart disease Paternal Grandfather         cardiac disorder    Diabetes Paternal Grandfather      I have reviewed and agree with the history as documented.    E-Cigarette/Vaping    E-Cigarette Use Current Every Day User     Comments medical marijuana      E-Cigarette/Vaping Substances    Nicotine No     THC Yes     CBD Yes     Flavoring No     Other No     Unknown No      Social History     Tobacco Use    Smoking status: Former     Current packs/day: 0.00     Average packs/day: 0.5 packs/day for 12.0 years (6.0 ttl pk-yrs)     Types: Cigarettes     Start date: 2006     Quit date: 2018     Years since quittin.4    Smokeless tobacco: Never    Tobacco comments:     quit 2018   Vaping Use    Vaping status: Every Day    Substances: THC, CBD   Substance Use Topics    Alcohol use: Not Currently    Drug use: Yes     Types: Marijuana     Comment: medical marijuana        Review of Systems   Constitutional:  Positive for diaphoresis and fatigue. Negative for chills and fever.   HENT:  Negative for congestion and sore throat.    Eyes:  Negative for pain and redness.   Respiratory:  Negative for cough and shortness of breath.    Cardiovascular:  Negative for chest pain and palpitations.   Gastrointestinal:  Positive for nausea. Negative for abdominal pain, diarrhea and vomiting.   Genitourinary:  Negative for dysuria and hematuria.   Musculoskeletal:  Negative for arthralgias and myalgias.   Skin:  Negative for color change, pallor and rash.   Neurological:  Positive for light-headedness. Negative for syncope, weakness, numbness and headaches.   All other systems reviewed and are  negative.      Physical Exam  ED Triage Vitals [07/16/24 1459]   Temperature Pulse Respirations Blood Pressure SpO2   97.9 °F (36.6 °C) 71 20 (!) 185/94 98 %      Temp Source Heart Rate Source Patient Position - Orthostatic VS BP Location FiO2 (%)   Oral Monitor Lying Right arm --      Pain Score       --             Orthostatic Vital Signs  Vitals:    07/16/24 1459 07/16/24 1500   BP: (!) 185/94 154/96   Pulse: 71 73   Patient Position - Orthostatic VS: Lying Lying       Physical Exam  Constitutional:       Appearance: Normal appearance. He is ill-appearing and diaphoretic.   HENT:      Head: Normocephalic and atraumatic.      Nose: Nose normal.      Mouth/Throat:      Mouth: Mucous membranes are moist.      Pharynx: Oropharynx is clear.   Eyes:      Conjunctiva/sclera: Conjunctivae normal.      Pupils: Pupils are equal, round, and reactive to light.   Cardiovascular:      Rate and Rhythm: Normal rate and regular rhythm.      Pulses: Normal pulses.      Heart sounds: Normal heart sounds. No murmur heard.     No friction rub. No gallop.   Pulmonary:      Effort: Pulmonary effort is normal.      Breath sounds: Normal breath sounds. No wheezing, rhonchi or rales.   Abdominal:      General: Abdomen is flat.      Palpations: Abdomen is soft.      Tenderness: There is no abdominal tenderness. There is no guarding or rebound.   Musculoskeletal:         General: No swelling or tenderness. Normal range of motion.      Cervical back: Normal range of motion and neck supple. No rigidity or tenderness.      Right lower leg: No edema.      Left lower leg: No edema.   Lymphadenopathy:      Cervical: No cervical adenopathy.   Skin:     General: Skin is warm.      Capillary Refill: Capillary refill takes less than 2 seconds.      Coloration: Skin is not jaundiced or pale.      Findings: No bruising, erythema, lesion or rash.   Neurological:      General: No focal deficit present.      Mental Status: He is alert and oriented to  person, place, and time.         ED Medications  Medications - No data to display    Diagnostic Studies  Results Reviewed       None                   No orders to display         Procedures  Procedures      ED Course                                       Medical Decision Making  38-year-old male with a history of T1DM, ESRD on HD MWF, HTN, HLD, and prior stroke who presents for evaluation of altered mental status after he became unresponsive while undergoing outpatient vascular testing.  The patient is mildly emergency.  The remainder the patient's vitals are within the normal limits.  On exam the patient is diaphoretic and pale.  While speaking to the patient he becomes and has return of normal color.  The patient reports that he is feeling much better.  Fingerstick glucose is obtained which shows a reading of 90 which is consistent with the patient's glucose monitor.  The patient is observed in the emergency department for 45 minutes with stable blood glucose and without return of symptoms.  Return precautions are given and the patient is discharged.          Disposition  Final diagnoses:   Hypoglycemia due to type 1 diabetes mellitus (HCC)     Time reflects when diagnosis was documented in both MDM as applicable and the Disposition within this note       Time User Action Codes Description Comment    7/16/2024  3:41 PM Aman Smith Add [E10.649] Hypoglycemia due to type 1 diabetes mellitus (HCC)           ED Disposition       ED Disposition   Discharge    Condition   Stable    Date/Time   Tue Jul 16, 2024  3:41 PM    Comment   Mateus Mckeon discharge to home/self care.                   Follow-up Information       Follow up With Specialties Details Why Contact Info Additional Information    [facility] Emergency Medicine Go to  If symptoms worsen [address] [facility] [place] [id number] [phone] [facility], [address]  [place], [place], [address]   [phone]            Discharge Medication List as of 7/16/2024  3:44 PM        CONTINUE these medications which have NOT CHANGED    Details   aspirin (ECOTRIN LOW STRENGTH) 81 mg EC tablet Take 81 mg by mouth daily, Historical Med      atorvastatin (LIPITOR) 40 mg tablet Take 1 tablet (40 mg total) by mouth every evening, Starting Thu 10/5/2023, Normal      b complex vitamins capsule Take 1 capsule by mouth daily before lunch , Historical Med      calcium acetate (PHOSLO) capsule Take 1 capsule (667 mg total) by mouth 3 (three) times a day, Starting Tue 1/23/2024, Normal      cinacalcet (SENSIPAR) 60 MG tablet Take 1 tablet (60 mg total) by mouth daily, Starting Tue 1/23/2024, Normal      cloNIDine (CATAPRES-TTS-3) 0.3 mg/24 hr 1 patch once a week, Historical Med      escitalopram (LEXAPRO) 20 mg tablet Take 1 tablet (20 mg total) by mouth daily, Starting Wed 1/24/2024, Normal      famotidine (PEPCID) 40 MG tablet TAKE 1 TABLET BY MOUTH IN THE MORNING, Starting Fri 5/31/2024, Normal      gabapentin (NEURONTIN) 100 mg capsule TAKE1 CAPSULES BY MOUTH AT BEDTIME, Normal      GLUCAGON EMERGENCY 1 MG injection Historical Med      Gvoke HypoPen 1-Pack 1 MG/0.2ML SOAJ Historical Med      hydrALAZINE (APRESOLINE) 100 MG tablet Take 1 tablet (100 mg total) by mouth 3 (three) times a day, Starting Tue 1/23/2024, Normal      hydrOXYzine HCL (ATARAX) 10 mg tablet Take 1 tablet (10 mg total) by mouth every 6 (six) hours as needed for itching or anxiety, Starting Wed 12/7/2022, Normal      Insulin Disposable Pump (Omnipod 5 G6 Intro, Gen 5,) KIT Starting Tue 1/17/2023, Historical Med      Insulin Disposable Pump (Omnipod 5 G6 Pod, Gen 5,) MISC Starting Fri 3/24/2023, Historical Med      labetalol (NORMODYNE) 200 mg tablet Take 2 tablets (400 mg total) by mouth 3 (three) times a day, Starting Sat 1/27/2024, Normal      lisinopril (ZESTRIL) 40 mg tablet Take 1 tablet (40 mg total)  by mouth daily, Starting Tue 1/23/2024, Normal      metoclopramide (REGLAN) 5 mg tablet Take 1 tablet by mouth three times daily as needed, Starting Thu 4/11/2024, Normal      minoxidil (LONITEN) 2.5 mg tablet Take 2 tablets (5 mg total) by mouth 2 (two) times a day, Starting Thu 2/8/2024, Normal      NIFEdipine (PROCARDIA XL) 30 mg 24 hr tablet Take 2 tablets (60 mg total) by mouth 2 (two) times a day, Starting Thu 2/8/2024, Normal      NovoLOG 100 UNIT/ML injection Historical Med      ondansetron (ZOFRAN) 4 mg tablet Take 1 tablet (4 mg total) by mouth every 8 (eight) hours as needed for nausea or vomiting, Starting Fri 6/21/2024, Normal      Patiromer Sorbitex Calcium (VELTASSA PO) Take 5 g by mouth once a week, Historical Med      saccharomyces boulardii (FLORASTOR) 250 mg capsule Take 250 mg by mouth daily, Historical Med      SPS 15 GM/60ML suspension TAKE 60 ML BY MOUTH SINGLE DOSE FOR EMERGENCY USE DURING MISSED HEMODIALYSIS, Historical Med      traZODone (DESYREL) 50 mg tablet TAKE 1/2 (ONE-HALF) TABLET BY MOUTH ONCE DAILY AT BEDTIME AS NEEDED FOR SLEEP, Normal           No discharge procedures on file.    PDMP Review         Value Time User    PDMP Reviewed  Yes 1/29/2024 12:53 AM Guanaco Collins MD             ED Provider  Attending physically available and evaluated Mateus Mckeon. I managed the patient along with the ED Attending.    Electronically Signed by           Aman Smith DO  07/16/24 8089

## 2024-07-16 NOTE — ED ATTENDING ATTESTATION
7/16/2024  I, Edu Caraballo DO, saw and evaluated the patient. I have discussed the patient with the resident/non-physician practitioner and agree with the resident's/non-physician practitioner's findings, Plan of Care, and MDM as documented in the resident's/non-physician practitioner's note, except where noted. All available labs and Radiology studies were reviewed.  I was present for key portions of any procedure(s) performed by the resident/non-physician practitioner and I was immediately available to provide assistance.       At this point I agree with the current assessment done in the Emergency Department.  I have conducted an independent evaluation of this patient a history and physical is as follows:    Patient is a 38-year-old male with a history of insulin-dependent diabetes with a continuous glucose monitor, insulin pump, end-stage renal disease on hemodialysis Monday, Wednesday, Friday.,  Previous stroke with slight decreased strength and fine motor control in his right arm,, ambulates with a rolling aid.  He is accompanied by his father.  Patient said he was more active today, getting outpatient studies done, and he had a alert from his Dexcom monitor showing that his blood sugar was going low.  He then members feeling, weak, lightheaded, diaphoretic, thought he may pass out.  He was in the hospital and medical emergency was called, staff gave him a bit of a cookie, brought him to the ED.  He said he is now feeling better.  He says on review of his Dexcom monitor does show his blood sugar dipped into the 50s, then is rising up a little bit now.  He says he is feeling much better.  His father who accompanies him indicates that there was no fall or direct trauma, no mental status changes.    General:  Patient is well-appearing  Head:  Atraumatic  Eyes:  Conjunctiva pink  ENT:  Mucous membranes are moist  Neck:  Supple  Cardiac:  S1-S2, without murmurs  Lungs:  Clear to auscultation  bilaterally  Abdomen:  Soft, nontender, normal bowel sounds, no CVA tenderness, no tympany, no rigidity, no guarding  Extremities:  Normal range of motion, left arm dialysis fistula with good thrill  Neurologic:  Awake, fluent speech, normal comprehension. AAOx3.   Skin:  Pink warm and diaphoretic  Psychiatric:  Alert, pleasant, cooperative    ED Course     Patient immediately assessed and evaluated by me and ED resident, capillary glucose was 76,   This corresponded to his the blood sugar on his Dexcom.    Patient ate some food, feeling better, sugar continue to rise in the Dexcom.  He said he did not want to eat anymore because he did not want to become hyperglycemic.    Patient had hypoglycemia, most likely from decreased p.o. intake and increased activity, blood sugar responding appropriately, his Dexcom appears to be functioning well and accurately measuring his blood sugar.  I do not believe requires additional laboratory studies and is stable for discharge with outpatient follow-up.  He and his father are comfortable with this plan.    DIAGNOSIS:  Acute hypoglycemia, end-stage renal disease on hemodialysis.    MEDICAL DECISION MAKING CODING    COLLECTION AND INTERPRETATION OF DATA  I reviewed prior external notes, including July 10, 2024 outpatient neurosurgery office visit    I ordered each unique test  Tests reviewed personally by me:  Labs: Capillary glucose as noted above        Critical Care Time  Procedures

## 2024-07-17 ENCOUNTER — VBI (OUTPATIENT)
Dept: INTERNAL MEDICINE CLINIC | Facility: CLINIC | Age: 39
End: 2024-07-17

## 2024-07-17 ENCOUNTER — OFFICE VISIT (OUTPATIENT)
Facility: CLINIC | Age: 39
End: 2024-07-17
Payer: MEDICARE

## 2024-07-17 DIAGNOSIS — I63.89 CEREBROVASCULAR ACCIDENT (CVA) DUE TO OTHER MECHANISM (HCC): Primary | ICD-10-CM

## 2024-07-17 LAB
CMV IGG SERPL IA-ACNC: 6.3 U/ML (ref 0–0.59)
CMV IGM SERPL IA-ACNC: <30 AU/ML (ref 0–29.9)
EBV EA IGG SER-ACNC: <9 U/ML (ref 0–8.9)
GAMMA INTERFERON BACKGROUND BLD IA-ACNC: 0.05 IU/ML
M TB IFN-G BLD-IMP: NEGATIVE
M TB IFN-G CD4+ BCKGRND COR BLD-ACNC: 0 IU/ML
M TB IFN-G CD4+ BCKGRND COR BLD-ACNC: 0 IU/ML
MITOGEN IGNF BCKGRD COR BLD-ACNC: 9.95 IU/ML

## 2024-07-17 PROCEDURE — 97112 NEUROMUSCULAR REEDUCATION: CPT

## 2024-07-17 PROCEDURE — 97110 THERAPEUTIC EXERCISES: CPT

## 2024-07-17 NOTE — PROGRESS NOTES
Daily Note     Today's date: 2024  Patient name: Mateus Mckeon  : 1985  MRN: [id number]  Referring provider: Dania Sher DO  Dx:   Encounter Diagnosis     ICD-10-CM    1. Cerebrovascular accident (CVA) due to other mechanism (HCC)  I63.89         Start Time: 0802  Stop Time: 0845  Total time in clinic (min): 43 minutes    PLAN OF CARE START: 24  PLAN OF CARE END: 2024  FREQUENCY: 2 times per week  PRECAUTIONS: Renal failure, actively going through dialysis; type 1 diabetes; HTN; SAH; seizure (last one was 2019)  ON FLUID RESTRICTIONS--DO NOT OFFER WATER    Subjective: Nothing new reported today    Objective: See treatment diary below  ON FLUID RESTRICTIONS--DO NOT OFFER WATER    TE:   Performed 3x10 bilateral ER with red theraband  Performed 3x10 reverse fly's (scap retraction, elbow extension)  Performed 3x10 standing push up's in standing for proprioceptive input, pec major strengthening, elbow extension    NMR:  -Tieing various knots by watching video and thru therapist demonstration for motor learning, bilateral coordination required for shoe tieing.     Assessment: Pt tolerated session well today. Continues to demonstrate impaired R hand FMC, limited endurance. Pt would benefit from continued skilled occupational therapy services to improve fine motor coordination, dexterity, stregnth, UE function, and functional cognition.    Plan: Continue per plan of care.     SHORT TERM GOALS ADDED 24:  Pt will increase sustained attention by completing trail making part A in 35 seconds to complete IADLs NOT MET  Pt will increase divided attention by completing trail making part B in 1 minute 30 seconds to complete IADLs NOT MET  Pt will increase delayed recall and recall 4 /5 items on MOCA to complete IADLs GOAL MET    LONG TERM GOALS ADDED 24:  Pt will increase sustained attention by completing trail making part A in 38 seconds to complete IADLs  Pt will increase divided  attention by completing trail making part B in 1 minute 10 seconds to complete IADLs  Pt will increase delayed recall and recall 5/5 items on MOCA to complete IADLs  Resume right  hand dominance to refined functional assist with all activities of daily living

## 2024-07-17 NOTE — TELEPHONE ENCOUNTER
07/17/24 2:13 PM    Patient contacted post ED visit, [facility] department spoke with patient/caregiver and outreach was successful.    Thank you.  Heidi Brown MA  [facility]

## 2024-07-17 NOTE — PSYCH
Mateus Mckeon did not attend today's (07/18/24) appointment and did not inform clerical staff of his potential absence on days prior to the evaluation. I awaited on both [employer] and [employer] platforms until 9:45 AM without success, as Paulo did not log-on or connect. Treatment team will attempt outreach and reschedule the patient accordingly. If the patient cannot be contacted directly, a HIPPA compliant voicemail will be left.     Treatment Plan not completed within required time limits due to: Mateus Mckeon no show appointment on 07/18/24

## 2024-07-18 ENCOUNTER — TELEMEDICINE (OUTPATIENT)
Dept: PSYCHIATRY | Facility: CLINIC | Age: 39
End: 2024-07-18

## 2024-07-18 DIAGNOSIS — Z91.199 NO-SHOW FOR APPOINTMENT: Primary | ICD-10-CM

## 2024-07-18 LAB
ANA TITR SER IF: NEGATIVE {TITER}
C PEPTIDE SERPL-MCNC: <0.1 NG/ML (ref 1.1–4.4)
HCV AB S/CO SERPL IA: NON REACTIVE
PROT C AG ACT/NOR PPP IA: 94 % OF NORMAL (ref 60–150)
PROT S ACT/NOR PPP: 119 % (ref 71–117)
SL AMB INTERPRETATION: NORMAL

## 2024-07-18 PROCEDURE — NC001 PR NO CHARGE: Performed by: STUDENT IN AN ORGANIZED HEALTH CARE EDUCATION/TRAINING PROGRAM

## 2024-07-19 LAB
B2 GLYCOPROT1 IGA SERPL IA-ACNC: 3.6
B2 GLYCOPROT1 IGG SERPL IA-ACNC: 1
B2 GLYCOPROT1 IGM SERPL IA-ACNC: <2.4
CARDIOLIPIN IGA SER IA-ACNC: 6.7
CARDIOLIPIN IGG SER IA-ACNC: 2.2
CARDIOLIPIN IGM SER IA-ACNC: 2.5

## 2024-07-19 NOTE — ASSESSMENT & PLAN NOTE
His creatinine and his urinary output are improving  Remains being followed by Nephrology I have reassured his parents that the plasmapheresis will not risk his renal function to the best of our knowledge at this point  Nephrology Consult  [facility]  Tom Walters. MD    CC:   acidosis    HPI:       7/17: pt is an 80 yo female with a pmh of dm, peripheral neuropathy and retinopathy, htn, hld, gerd, thyroid cancer sp thyroidectomy, djd, who came in with abd pain and nausea on 7/13/24. She had this pain for several days pta and it is in the RUQ. She has been seen by general surgery for possible cholycystitis. She says she has ahd this before. She has been having nausea but no vomiting, diarrhea, fever, chills, headache, cough, cp. Her labs show na 136, k 3.6, co2 23> 10, bun 18. Cr 0.7, ca 8.5, alb 3.1, wbc 25> 17, hgb 13.9, plt, tbili 0.3,ast 38, alt 20, alp 108. Ha1c 8.1. gas shows ph 7.1, pco2 15, p o2 93. Glucose is 189 and beta hydroxybutyrate is elevated. She is being started on insulin for dka. She had swelling of her lips and dry mouth, no rash or itching. She has been on several abx including zosyn, cefepime, and flagyl. She was on metformin at home but not here and was on jardiance and cozaar as well. She is being transferred to the icu. Vitals show bp 159/90, hr 104, rr 18, temp 98.5. she has had sob since yesterday.    7/18: pt seen and examined in icu. No cp, sob, sp perc choly tube today with 90 ml of green fluid drained. She is sitting in the chair. Friend is visiting. She is hungry for dinner.     7/19: pt seen and examined in icu. Abd pain and nausea improved, she is eating, she is starting to feel swollen    PMH:    Past Medical History:   Diagnosis Date    Arthritis     Cancer (HCC)     thyroid / diagnosed 9/2020    Cardiac valve prolapse     no issues, no cardiology    Dizziness     dt imbalance of crystals in ears    GERD (gastroesophageal reflux disease)     Hyperlipidemia     Hypertension     Neuropathy     legs, feet    Prolonged emergence from general anesthesia     SOB (shortness of breath)     when gets Bronchitis / uses inhalers prn    Type II or unspecified type diabetes mellitus without mention  of complication, not stated as uncontrolled     Wears dentures     upper plate       Patient Active Problem List   Diagnosis    Hypertension    Hyperlipidemia    Detached retina, right    Retinopathy of both eyes    DM type 2 with diabetic peripheral neuropathy (HCC)    Calculus of gallbladder without cholecystitis without obstruction    Fatty liver    Sciatic nerve disease    Onychomycosis    Left sided sciatica    Left hip pain    Lumbar pain    Radicular pain of sacrum    S/P thyroidectomy    Pain of right lower extremity    Sciatica of right side    Anemia    H/O malignant neoplasm of thyroid    Type II diabetes mellitus with nephropathy (HCC)    Rupture of right long head biceps tendon    Acute cholecystitis    Leukocytosis    Acidosis    Hypothyroidism    Uncontrolled type 2 diabetes mellitus with hyperglycemia (HCC)       Meds:     calcium gluconate  2,000 mg IntraVENous Once    sodium bicarbonate  650 mg Oral 4x Daily    insulin glargine  15 Units SubCUTAneous Daily    insulin lispro  0-4 Units SubCUTAneous Nightly    losartan  50 mg Oral Daily    insulin lispro  0-16 Units SubCUTAneous TID WC    enoxaparin  40 mg SubCUTAneous Daily    piperacillin-tazobactam  3,375 mg IntraVENous Q8H    sodium chloride flush  5-40 mL IntraVENous 2 times per day    aspirin  81 mg Oral Daily    DULoxetine  30 mg Oral Daily    levothyroxine  100 mcg Oral Daily    pantoprazole  40 mg Oral QAM AC    atorvastatin  10 mg Oral Daily        sodium bicarbonate 75 mEq in sodium chloride 0.45 % 1,000 mL infusion 100 mL/hr at 07/18/24 2244    sodium chloride      dextrose         Meds prn:     hydrALAZINE, potassium chloride **OR** potassium alternative oral replacement **OR** potassium chloride, dextrose bolus **OR** dextrose bolus, sodium phosphate 15 mmol in sodium chloride 0.9 % 250 mL IVPB, lidocaine viscous hcl, sodium chloride flush, sodium chloride, ondansetron **OR** ondansetron, polyethylene glycol, acetaminophen **OR**

## 2024-07-22 ENCOUNTER — OFFICE VISIT (OUTPATIENT)
Facility: CLINIC | Age: 39
End: 2024-07-22
Payer: MEDICARE

## 2024-07-22 DIAGNOSIS — E10.42 DIABETIC POLYNEUROPATHY ASSOCIATED WITH TYPE 1 DIABETES MELLITUS (HCC): ICD-10-CM

## 2024-07-22 DIAGNOSIS — I63.9 ACUTE CVA (CEREBROVASCULAR ACCIDENT) (HCC): Primary | ICD-10-CM

## 2024-07-22 DIAGNOSIS — I63.89 CEREBROVASCULAR ACCIDENT (CVA) DUE TO OTHER MECHANISM (HCC): Primary | ICD-10-CM

## 2024-07-22 DIAGNOSIS — R20.0 RIGHT UPPER EXTREMITY NUMBNESS: ICD-10-CM

## 2024-07-22 LAB — MISCELLANEOUS LAB TEST RESULT: NORMAL

## 2024-07-22 PROCEDURE — 97112 NEUROMUSCULAR REEDUCATION: CPT

## 2024-07-22 PROCEDURE — 97110 THERAPEUTIC EXERCISES: CPT

## 2024-07-22 RX ORDER — GABAPENTIN 100 MG/1
CAPSULE ORAL
Qty: 90 CAPSULE | Refills: 3 | Status: SHIPPED | OUTPATIENT
Start: 2024-07-22

## 2024-07-22 NOTE — PROGRESS NOTES
Daily Note     Today's date: 2024  Patient name: Mateus Mckeon  : 1985  MRN: [id number]  Referring provider: Dania Sher DO  Dx:   Encounter Diagnosis     ICD-10-CM    1. Cerebrovascular accident (CVA) due to other mechanism (HCC)  I63.89           Start Time: 0800  Stop Time: 0845  Total time in clinic (min): 45 minutes    PLAN OF CARE START: 24  PLAN OF CARE END: 2024  FREQUENCY: 2 times per week  PRECAUTIONS: Renal failure, actively going through dialysis; type 1 diabetes; HTN; SAH; seizure (last one was 2019)  ON FLUID RESTRICTIONS--DO NOT OFFER WATER    Subjective: Nothing new reported today    Objective: See treatment diary below  ON FLUID RESTRICTIONS--DO NOT OFFER WATER    TE:   Performed 3x10 bilateral ER with red theraband  Performed 3x10 singe arm fly's (scap retraction, elbow extension)  -Break required after each set     NMR:  -beading activity completed to address bilateral coordination, with focus on R FMC and dexterity. Compensatory shoulder movements noted. Increased challenge by instructing patient to hold 3-4 beads in palm then threading onto string.     -Shape race with focus on R FMC, dexterity, fine grasp and attention. In-hand manipulation challenge by gathering 6-7 pieces in palm and work to his fingertips.      Assessment: Pt tolerated session well today. Continues to demonstrate impaired R hand FMC, limited endurance. Pt would benefit from continued skilled occupational therapy services to improve fine motor coordination, dexterity, stregnth, UE function, and functional cognition.    Plan: Continue per plan of care.     SHORT TERM GOALS ADDED 24:  Pt will increase sustained attention by completing trail making part A in 35 seconds to complete IADLs NOT MET  Pt will increase divided attention by completing trail making part B in 1 minute 30 seconds to complete IADLs NOT MET  Pt will increase delayed recall and recall 4 /5 items on MOCA to complete  IADLs GOAL MET    LONG TERM GOALS ADDED 2/20/24:  Pt will increase sustained attention by completing trail making part A in 38 seconds to complete IADLs  Pt will increase divided attention by completing trail making part B in 1 minute 10 seconds to complete IADLs  Pt will increase delayed recall and recall 5/5 items on MOCA to complete IADLs  Resume right  hand dominance to refined functional assist with all activities of daily living

## 2024-07-22 NOTE — PROGRESS NOTES
Daily Note     Today's date: 2024  Patient name: Mateus Mckeon  : 1985  MRN: [id number]  Referring provider: Dania Sher DO  Dx:   Encounter Diagnosis     ICD-10-CM    1. Acute CVA (cerebrovascular accident) (HCC)  I63.9                   POC expires Auth Status Total   Visits  Start date  Expiration date PT/OT + Visit Limit? Co-Insurance   24     bomn                                                 Subjective: Patient presents to PT stating he had a vascular test where he had a hypoglycemic episode, was admitted and received glucose. No adverse effects since.     Objective: See treatment diary below  *FLUID RESTRICTION- do not offer water*     Vitals taken at beginning of session:  BP: 144/80 mmHg (RUE seated)  HR: 76 bpm  SpO2: 99%    NMR:  - Overground ambulation 3# ankle weights w/ facility do ft  - Side stepping with 10# TT + 3# ankle weights- 2 mins   - Step-ups to foam pad from medium RR: 15x, 2 sets  - Standing on foam beam to ring placement on facility dog: 3 minutes  - Agility ladder w/ intermittent RR: 3 laps    Assessment: Patient tolerated treatment well today with continued focus on balance and functional mobility. Include activities with facility dog this date as patient is highly motivated with canine . He continues to display frequent retropulsion when on compliant surfaces however, with improved hip-ankle strategies to maintain balance. Consider introducing dynamic balance activities on complaint and uneven surfaces. Patient would benefit from continued PT to improve balance, endurance, and reduce fall risk.     Plan: Continue per plan of care.  Gait belt throughout session. Continue to incorporate more balance activities on foam surfaces             Outcome Measures Initial Eval  24 Re-eval  24 PN  2024 RE  2024 RE   24 RE  7/15/24   5xSTS 31.25 sec 52.20 sec no UE's    (1 LOB) 19.91 sec no UE 20.14 sec  No UE  15.84 sec  No UE  11.34 sec  No UE   TUG 16.3 sec with rollator    23.6 sec no AD 20.68 sec with rollator    18.44 sec no AD 23.41 sec with rollator    12.92 sec no AD 19.69 sec with rollator    12.45 sec no AD  15.79 secs with rollator    11.54 secs no AD 14.37 sec, w/ rollator      10.62 sec, no AD   10 meter  Next visit> 0.88 m/s 1.08 m/s Self selected:  .80 m/s  Fast: 1.29 m/s 1.16 self selected pace   MEDRANO  Next visit> 44/56 48/56 48/56 47/56   FGA 12/30 Next visit> 12/30 12/30 14/30 16/30   6MWT 900 ft 710 ft  Rollator  760 ft no  ft  No AD  885 ft no AD 1050 ft no AD   mCTSIB  FTEO firm  FTEC firm  FTEO foam  FTEC foam   Next visit> 30 sec  30 sec  5.5 sec  defer 30 sec  30 sec  30 sec  1 sec 30 sec  30 sec  9 secs  Unable to maintain 30 sec  30 sec  30 sec +  3 sec   ABC  65% 43.75% 56.88% 61.88% 58.75%

## 2024-07-22 NOTE — TELEPHONE ENCOUNTER
Patient's mother states that he is to be taking this medication 1 in the morning and 2 at bedtime. That regimen is working wonderfully for him but they need a new script to be written reflecting the correct directions. They do not want [facility] to continue filling this medication. They would like to continue with Dr. Sher.    Reason for call:   [x] Refill   [] Prior Auth  [x] Other: DIRECTION CHANGE    Office:   [x] PCP/Provider - [facility]  [] Specialty/Provider -     Medication:       Does the patient have enough for 3 days?   [] Yes   [x] No - Send as HP to POD

## 2024-07-24 ENCOUNTER — TELEPHONE (OUTPATIENT)
Dept: PSYCHIATRY | Facility: CLINIC | Age: 39
End: 2024-07-24

## 2024-07-24 ENCOUNTER — APPOINTMENT (OUTPATIENT)
Facility: CLINIC | Age: 39
End: 2024-07-24
Payer: MEDICARE

## 2024-07-24 NOTE — LETTER
24     Mateus Mckeon   : 1985   [address] [place] [address]       It is the policy of [facility] to monitor and manage appointments that have been no-showed or cancelled with less than 48-hour notice. This is necessary to ensure that we are able to provide timely access for all patients to our providers. Undue numbers of unutilized appointments delays necessary medical care for all patients.      Dear Mateus Mckeon:      We are sorry that you missed your appointment with Pedrito Bermeo MD on 2024. It has been 5 months  since your last appointment. Your health and follow-up care are important to us. We want to make you aware that you do not have another appointment with Pedrito Bermeo MD scheduled.    Please be aware that our office policy states that if you 'no show' two or more Medication Management  appointments without prior notice of cancellation within in a calendar year, you may be discharged from Medication Management  treatment.  We want to bring this to your attention now to prevent an interruption of your care.  If you have any questions about this policy, please call us at the number above.     If we do not hear from you within 10 business days to make a follow up appointment, we will assume you are no longer interested in care here.    Thank you in advance for your cooperation and assistance.       Sincerely,      [facility] Support Staff

## 2024-07-24 NOTE — TELEPHONE ENCOUNTER
NO-SHOW LETTER MAILED TO Mateus Mckeon.  ADDRESS: [address] [place] [id number]

## 2024-07-25 ENCOUNTER — APPOINTMENT (OUTPATIENT)
Facility: CLINIC | Age: 39
End: 2024-07-25
Payer: MEDICARE

## 2024-07-25 ENCOUNTER — OFFICE VISIT (OUTPATIENT)
Facility: CLINIC | Age: 39
End: 2024-07-25
Payer: MEDICARE

## 2024-07-25 ENCOUNTER — TELEPHONE (OUTPATIENT)
Dept: GASTROENTEROLOGY | Facility: CLINIC | Age: 39
End: 2024-07-25

## 2024-07-25 DIAGNOSIS — I63.9 ACUTE CVA (CEREBROVASCULAR ACCIDENT) (HCC): Primary | ICD-10-CM

## 2024-07-25 PROCEDURE — 97530 THERAPEUTIC ACTIVITIES: CPT

## 2024-07-25 NOTE — TELEPHONE ENCOUNTER
Spoke with patient confirming his 8/1 procedure with Dr. Phelan. He has his  and prep instructions. He will be called day prior with his arrival time. I reminded him to have clear liquids the entire day prior with nothing red, purple or orange..

## 2024-07-25 NOTE — PROGRESS NOTES
Daily Note     Today's date: 2024  Patient name: aMteus Mckeon  : 1985  MRN: [id number]  Referring provider: Dania Sher DO  Dx:   Encounter Diagnosis     ICD-10-CM    1. Acute CVA (cerebrovascular accident) (HCC)  I63.9                   POC expires Auth Status Total   Visits  Start date  Expiration date PT/OT + Visit Limit? Co-Insurance   24     bomn                                                 Subjective: Patient reports his BP was high after his nap earlier; he took meds (had not taken at usual time due to nap) and then it was lower before coming to PT. Reports he had a slight dull headache both prior to and after his nap but thinks it is related to stress; reports no headache at present. Reports he feels lightheaded when standing up; notes his medical team is aware.     Objective: See treatment diary below  *FLUID RESTRICTION- do not offer water*     Vitals taken at beginning of session:  BP: 130/92 mmHg (RUE, seated); 90/62 mmHg (RUE, standing)  HR: 75 bpm  SpO2: 99%    Clinical discussion regarding BP: see below and subjective.     Patient education: discussed findings related to BP drop in standing and recommended discussing further with PCP and medical team. Messaged PCP with findings as well. Educated patient to rise slowly from chair and about strategies such as arm/leg pumps prior to standing. Advised seeking higher level of medical care with any abnormal or adverse symptoms; patient verbalized understanding.     - Seated marchin reps each leg (2 sets)   - LAQ: 20 reps each leg (2 sets)     BP: 120/78 mmHg post-session (RUE, seated)      Assessment: Held usual program today due to findings of BP drop when rising to standing position. Educated patient as noted above and he verbalized good understanding. Patient reported feeling at his baseline end of session (slight lightheadedness which he reports is typical and that his medical team is aware; denied passing out  sensation). Patient would benefit from continued PT to improve balance, endurance, and reduce fall risk.     Plan: Continue per plan of care.  Gait belt throughout session. Continue to incorporate more balance activities on foam surfaces             Outcome Measures Initial Eval  1/30/24 Re-eval  2/20/24 PN  4/2/2024 RE  5/2/2024 RE   6/17/24 RE  7/15/24   5xSTS 31.25 sec 52.20 sec no UE's    (1 LOB) 19.91 sec no UE 20.14 sec  No UE  15.84 sec  No UE 11.34 sec  No UE   TUG 16.3 sec with rollator    23.6 sec no AD 20.68 sec with rollator    18.44 sec no AD 23.41 sec with rollator    12.92 sec no AD 19.69 sec with rollator    12.45 sec no AD  15.79 secs with rollator    11.54 secs no AD 14.37 sec, w/ rollator      10.62 sec, no AD   10 meter  Next visit> 0.88 m/s 1.08 m/s Self selected:  .80 m/s  Fast: 1.29 m/s 1.16 self selected pace   MEDRANO  Next visit> 44/56 48/56 48/56 47/56   FGA 12/30 Next visit> 12/30 12/30 14/30 16/30   6MWT 900 ft 710 ft  Rollator  760 ft no  ft  No AD  885 ft no AD 1050 ft no AD   mCTSIB  FTEO firm  FTEC firm  FTEO foam  FTEC foam   Next visit> 30 sec  30 sec  5.5 sec  defer 30 sec  30 sec  30 sec  1 sec 30 sec  30 sec  9 secs  Unable to maintain 30 sec  30 sec  30 sec +  3 sec   ABC  65% 43.75% 56.88% 61.88% 58.75%

## 2024-07-26 ENCOUNTER — NURSE TRIAGE (OUTPATIENT)
Age: 39
End: 2024-07-26

## 2024-07-26 NOTE — TELEPHONE ENCOUNTER
"Francine called on Mateus not sure where to start he is currently on the kidney transplant.     You had ordered a nuclear stress test, they had advised the testing was inconclusive, and You was going to reach out to Dr Middleton to discuss further.       Answer Assessment - Initial Assessment Questions  1. REASON FOR CALL or QUESTION: \"What is your reason for calling today?\" or \"How can I best help you?\" or \"What question do you have that I can help answer?\"          Francine his mother on [employer] called on Mateus not sure where to start he is currently on the kidney transplant.     You had ordered a nuclear stress test, they had advised the testing was inconclusive, and You was going to reach out to Dr Middleton to discuss further.     Per Francine has not heard anything and is wondering if Dr Middleton had spoken to You.     Please advise    Protocols used: Information Only Call - No Triage-ADULT-[place]    "

## 2024-07-29 ENCOUNTER — OFFICE VISIT (OUTPATIENT)
Facility: CLINIC | Age: 39
End: 2024-07-29
Payer: MEDICARE

## 2024-07-29 DIAGNOSIS — I63.9 ACUTE CVA (CEREBROVASCULAR ACCIDENT) (HCC): Primary | ICD-10-CM

## 2024-07-29 DIAGNOSIS — I63.89 CEREBROVASCULAR ACCIDENT (CVA) DUE TO OTHER MECHANISM (HCC): Primary | ICD-10-CM

## 2024-07-29 LAB
11OH-THC SPEC-MCNC: 7.3 NG/ML
AMPHETAMINES SERPL QL SCN: NEGATIVE NG/ML
BARBITURATES SERPL QL SCN: NEGATIVE UG/ML
BENZODIAZ SPEC QL: NEGATIVE NG/ML
CANNABIDIOL SERPLBLD CFM-MCNC: NEGATIVE NG/ML
CANNABINOIDS SERPL QL SCN: ABNORMAL NG/ML
CARBOXYTHC BLD-MCNC: 195.4 NG/ML
COCAINE+BZE SERPL QL SCN: NEGATIVE NG/ML
OPIATES SERPL QL SCN: NEGATIVE NG/ML
OXYCODONE+OXYMORPHONE SERPLBLD QL SCN: NEGATIVE NG/ML
PCP SERPL QL SCN: NEGATIVE NG/ML
SL AMB CANNABINOID CONFIRMATION: POSITIVE
THC SERPLBLD CFM-MCNC: 47.9 NG/ML

## 2024-07-29 PROCEDURE — 97110 THERAPEUTIC EXERCISES: CPT

## 2024-07-29 PROCEDURE — 97112 NEUROMUSCULAR REEDUCATION: CPT

## 2024-07-29 PROCEDURE — 97112 NEUROMUSCULAR REEDUCATION: CPT | Performed by: PHYSICAL THERAPIST

## 2024-07-29 NOTE — PROGRESS NOTES
Daily Note     Today's date: 2024  Patient name: Mateus Mckeon  : 1985  MRN: [id number]  Referring provider: Dania Sher DO  Dx:   Encounter Diagnosis     ICD-10-CM    1. Acute CVA (cerebrovascular accident) (HCC)  I63.9                     POC expires Auth Status Total   Visits  Start date  Expiration date PT/OT + Visit Limit? Co-Insurance   24     bomn                                             Patient treated by JANNETH Bateman under supervision from this PT. We discussed the case to ensure appropriate continuation and progression of care and I reviewed the documentation.      Subjective: Patient reports to therapy stating he took his BP meds this morning, but did not check before he came.     Objective: See treatment diary below  *FLUID RESTRICTION- do not offer water*     Vitals taken at beginning of session:  BP: 142/82 mmHg (RUE, seated); 138/82 mmHg (RUE, standing)  HR: 73 bpm  SpO2: 98%    NMR: (3# ankle weights)  - Step ups to foam pad - 15 reps x 1 CGA  -Agility ladder ins and outs with medium and small river rocks - 2 laps x 1  - Side stepping on foam beam with 5# TT- 5 laps x 1  -FWD/BWD stepping from foam beam to small river rocks- 1 lap x 2 CGA>Iveth  -Walking with 10# TT for 250 ft + ramp    BP: 154/92 mmHg post-session (RUE, seated)      Assessment: Patient tolerated treatment well today with continued focus on balance and functional mobility. With step ups to foam pad, pt had increased multidirectional postural sway, but able to maintain upright. With sidestepping on foam beam, pt demonstrated increased forward lean requiring verbal cues to bring shoulders back and Iveth from therapist to prevent a fall. With FWD/BWD from foam beam to river rocks, pt required min A to remain upright due to poor utilization of hip strategy. With agility ladder, pt had multiple LOB, but able to utilize stepping strategy to prevent a fall.  Patient would benefit from continued PT to  improve balance, endurance, and reduce fall risk.     Plan: Continue per plan of care.  Gait belt throughout session. Continue to incorporate more balance activities on foam surfaces             Outcome Measures Initial Eval  1/30/24 Re-eval  2/20/24 PN  4/2/2024 RE  5/2/2024 RE   6/17/24 RE  7/15/24   5xSTS 31.25 sec 52.20 sec no UE's    (1 LOB) 19.91 sec no UE 20.14 sec  No UE  15.84 sec  No UE 11.34 sec  No UE   TUG 16.3 sec with rollator    23.6 sec no AD 20.68 sec with rollator    18.44 sec no AD 23.41 sec with rollator    12.92 sec no AD 19.69 sec with rollator    12.45 sec no AD  15.79 secs with rollator    11.54 secs no AD 14.37 sec, w/ rollator      10.62 sec, no AD   10 meter  Next visit> 0.88 m/s 1.08 m/s Self selected:  .80 m/s  Fast: 1.29 m/s 1.16 self selected pace   MEDRANO  Next visit> 44/56 48/56 48/56 47/56   FGA 12/30 Next visit> 12/30 12/30 14/30 16/30   6MWT 900 ft 710 ft  Rollator  760 ft no  ft  No AD  885 ft no AD 1050 ft no AD   mCTSIB  FTEO firm  FTEC firm  FTEO foam  FTEC foam   Next visit> 30 sec  30 sec  5.5 sec  defer 30 sec  30 sec  30 sec  1 sec 30 sec  30 sec  9 secs  Unable to maintain 30 sec  30 sec  30 sec +  3 sec   ABC  65% 43.75% 56.88% 61.88% 58.75%

## 2024-07-29 NOTE — PROGRESS NOTES
Daily Note     Today's date: 2024  Patient name: Mateus Mckeon  : 1985  MRN: [id number]  Referring provider: Dania Sher DO  Dx:   Encounter Diagnosis     ICD-10-CM    1. Cerebrovascular accident (CVA) due to other mechanism (HCC)  I63.89           Start Time: 0802  Stop Time: 0842  Total time in clinic (min): 40 minutes    PLAN OF CARE START: 24  PLAN OF CARE END: 2024  FREQUENCY: 2 times per week  PRECAUTIONS: Renal failure, actively going through dialysis; type 1 diabetes; HTN; SAH; seizure (last one was 2019)  ON FLUID RESTRICTIONS--DO NOT OFFER WATER    Subjective: Nothing new reported today    Objective: See treatment diary below  ON FLUID RESTRICTIONS--DO NOT OFFER WATER    TE:   Performed 3x10 bilateral ER with red theraband  Performed circuit of reverse fly's (scap retraction with elbow extension), PNF D2 flexion, and PNF D1 extension - 2 rounds with 10 reps each set.      NMR:  -Lacing exercises to work on shoe tying, bilateral coordination, dexterity, finger isolation. Progressed to novel braiding exercise, to showed good understanding and did well fine motor control thru the exercise.       Assessment: Pt tolerated session well today. Continues to demonstrate impaired R hand FMC, limited endurance. Pt would benefit from continued skilled occupational therapy services to improve fine motor coordination, dexterity, stregnth, UE function, and functional cognition.    Plan: Continue per plan of care.     SHORT TERM GOALS ADDED 24:  Pt will increase sustained attention by completing trail making part A in 35 seconds to complete IADLs NOT MET  Pt will increase divided attention by completing trail making part B in 1 minute 30 seconds to complete IADLs NOT MET  Pt will increase delayed recall and recall 4 /5 items on MOCA to complete IADLs GOAL MET    LONG TERM GOALS ADDED 24:  Pt will increase sustained attention by completing trail making part A in 38 seconds to  complete IADLs  Pt will increase divided attention by completing trail making part B in 1 minute 10 seconds to complete IADLs  Pt will increase delayed recall and recall 5/5 items on MOCA to complete IADLs  Resume right  hand dominance to refined functional assist with all activities of daily living

## 2024-07-30 ENCOUNTER — APPOINTMENT (OUTPATIENT)
Dept: LAB | Facility: AMBULARY SURGERY CENTER | Age: 39
End: 2024-07-30
Payer: MEDICARE

## 2024-07-30 DIAGNOSIS — E03.9 HYPOTHYROIDISM, UNSPECIFIED TYPE: Chronic | ICD-10-CM

## 2024-07-30 LAB — TSH SERPL DL<=0.05 MIU/L-ACNC: 3.09 UIU/ML (ref 0.45–4.5)

## 2024-07-30 PROCEDURE — 36415 COLL VENOUS BLD VENIPUNCTURE: CPT

## 2024-07-30 PROCEDURE — 84443 ASSAY THYROID STIM HORMONE: CPT

## 2024-07-30 NOTE — PLAN OF CARE
2 days before your procedure, I would like you to take 4 doses of MiraLAX in 28 ounces of liquid.  You can drink this throughout the day but this is going to get things moving for you so we can ensure a good prep.  The day before your procedure please follow the traditional MiraLAX/Gatorade/Dulcolax bowel prep instructions.    If you do need anything more chronically to help keep your bowels nice and soft, a sugar-free fiber supplement or MiraLAX is incredibly safe to take.   Problem: PAIN - ADULT  Goal: Verbalizes/displays adequate comfort level or baseline comfort level  Interventions:  - Encourage patient to monitor pain and request assistance  - Assess pain using appropriate pain scale  - Administer analgesics based on type and severity of pain and evaluate response  - Implement non-pharmacological measures as appropriate and evaluate response  - Consider cultural and social influences on pain and pain management  - Notify physician/advanced practitioner if interventions unsuccessful or patient reports new pain   Outcome: Progressing      Problem: INFECTION - ADULT  Goal: Absence or prevention of progression during hospitalization  INTERVENTIONS:  - Assess and monitor for signs and symptoms of infection  - Monitor lab/diagnostic results  - Monitor all insertion sites, i e  indwelling lines, tubes, and drains  - Monitor endotracheal (as able) and nasal secretions for changes in amount and color  - [place] appropriate cooling/warming therapies per order  - Administer medications as ordered  - Instruct and encourage patient and family to use good hand hygiene technique  - Identify and instruct in appropriate isolation precautions for identified infection/condition   Outcome: Progressing    Goal: Absence of fever/infection during neutropenic period  INTERVENTIONS:  - Monitor WBC  - Implement neutropenic guidelines   Outcome: Progressing      Problem: SAFETY ADULT  Goal: Patient will remain free of falls  INTERVENTIONS:  - Assess patient frequently for physical needs  -  Identify cognitive and physical deficits and behaviors that affect risk of falls    -  [place] fall precautions as indicated by assessment   - Educate patient/family on patient safety including physical limitations  - Instruct patient to call for assistance with activity based on assessment  - Modify environment to reduce risk of injury  - Consider OT/PT consult to assist with strengthening/mobility    Outcome: Progressing    Goal: Maintain or return to baseline ADL function  INTERVENTIONS:  -  Assess patient's ability to carry out ADLs; assess patient's baseline for ADL function and identify physical deficits which impact ability to perform ADLs (bathing, care of mouth/teeth, toileting, grooming, dressing, etc )  - Assess/evaluate cause of self-care deficits   - Assess range of motion  - Assess patient's mobility; develop plan if impaired  - Assess patient's need for assistive devices and provide as appropriate  - Encourage maximum independence but intervene and supervise when necessary  ¯ Involve family in performance of ADLs  ¯ Assess for home care needs following discharge   ¯ Request OT consult to assist with ADL evaluation and planning for discharge  ¯ Provide patient education as appropriate   Outcome: Progressing    Goal: Maintain or return mobility status to optimal level  INTERVENTIONS:  - Assess patient's baseline mobility status (ambulation, transfers, stairs, etc )    - Identify cognitive and physical deficits and behaviors that affect mobility  - Identify mobility aids required to assist with transfers and/or ambulation (gait belt, sit-to-stand, lift, walker, cane, etc )  - [place] fall precautions as indicated by assessment  - Record patient progress and toleration of activity level on Mobility SBAR; progress patient to next Phase/Stage  - Instruct patient to call for assistance with activity based on assessment  - Request Rehabilitation consult to assist with strengthening/weightbearing, etc    Outcome: Progressing      Problem: Knowledge Deficit  Goal: Patient/family/caregiver demonstrates understanding of disease process, treatment plan, medications, and discharge instructions  Complete learning assessment and assess knowledge base    Interventions:  - Provide teaching at level of understanding  - Provide teaching via preferred learning methods   Outcome: Progressing      Problem: Neurological Deficit  Goal: Neurological status is stable or improving  Interventions:  - Monitor and assess patient's level of consciousness, motor function, sensory function, and level of assistance needed for ADLs  - Monitor and report changes from baseline  Collaborate with interdisciplinary team to initiate plan and implement interventions as ordered  - Provide and maintain a safe environment  - Utilize seizure precautions  - Utilize fall precautions  - Utilize aspiration precautions  - Utilize bleeding precautions  Outcome: Progressing      Problem: Activity Intolerance/Impaired Mobility  Goal: Mobility/activity is maintained at optimum level for patient  Interventions:  - Assess and monitor patient  barriers to mobility and need for assistive/adaptive devices  - Assess patient's emotional response to limitations  - Collaborate with interdisciplinary team and initiate plans and interventions as ordered  - Encourage independent activity per ability   - Maintain proper body alignment  - Perform active/passive rom as tolerated/ordered  - Plan activities to conserve energy   - Turn patient   Outcome: Progressing      Problem: Potential for Falls  Goal: Patient will remain free of falls  INTERVENTIONS:  - Assess patient frequently for physical needs  -  Identify cognitive and physical deficits and behaviors that affect risk of falls    -  [place] fall precautions as indicated by assessment   - Educate patient/family on patient safety including physical limitations  - Instruct patient to call for assistance with activity based on assessment  - Modify environment to reduce risk of injury  - Consider OT/PT consult to assist with strengthening/mobility    Outcome: Progressing      Problem: Nutrition/Hydration-ADULT  Goal: Nutrient/Hydration intake appropriate for improving, restoring or maintaining nutritional needs  Monitor and assess patient's nutrition/hydration status for malnutrition (ex- brittle hair, bruises, dry skin, pale skin and conjunctiva, muscle wasting, smooth red tongue, and disorientation)  Collaborate with interdisciplinary team and initiate plan and interventions as ordered  Monitor patient's weight and dietary intake as ordered or per policy  Utilize nutrition screening tool and intervene per policy  Determine patient's food preferences and provide high-protein, high-caloric foods as appropriate       INTERVENTIONS:  - Monitor oral intake, urinary output, labs, and treatment plans  - Assess nutrition and hydration status and recommend course of action  - Evaluate amount of meals eaten  - Assist patient with eating if necessary   - Allow adequate time for meals  - Recommend/ encourage appropriate diets, oral nutritional supplements, and vitamin/mineral supplements  - Order, calculate, and assess calorie counts as needed  - Recommend, monitor, and adjust tube feedings and TPN/PPN based on assessed needs  - Assess need for intravenous fluids  - Provide specific nutrition/hydration education as appropriate  - Include patient/family/caregiver in decisions related to nutrition   Outcome: Progressing

## 2024-07-31 ENCOUNTER — ANESTHESIA (OUTPATIENT)
Dept: ANESTHESIOLOGY | Facility: HOSPITAL | Age: 39
End: 2024-07-31

## 2024-07-31 ENCOUNTER — ANESTHESIA EVENT (OUTPATIENT)
Dept: ANESTHESIOLOGY | Facility: HOSPITAL | Age: 39
End: 2024-07-31

## 2024-07-31 RX ORDER — SODIUM CHLORIDE, SODIUM LACTATE, POTASSIUM CHLORIDE, CALCIUM CHLORIDE 600; 310; 30; 20 MG/100ML; MG/100ML; MG/100ML; MG/100ML
50 INJECTION, SOLUTION INTRAVENOUS CONTINUOUS
Status: CANCELLED | OUTPATIENT
Start: 2024-07-31

## 2024-07-31 NOTE — ANESTHESIA PREPROCEDURE EVALUATION
Procedure:  PRE-OP ONLY    Relevant Problems   CARDIO   (+) Coronary artery disease involving native coronary artery of native heart without angina pectoris   (+) Hyperlipidemia   (+) Renovascular hypertension      ENDO   (+) Hypothyroidism   (+) Secondary hyperparathyroidism (HCC)   (+) Type 1 diabetes mellitus (HCC)   (+) Type 1 diabetes mellitus on insulin therapy (HCC)      GI/HEPATIC   (+) Gastroesophageal reflux disease without esophagitis      /RENAL   (+) Anemia due to chronic kidney disease, on chronic dialysis ([facility])   (+) ESRD on hemodialysis (HCC)      HEMATOLOGY   (+) Anemia due to chronic kidney disease, on chronic dialysis (HCC)   (+) Anemia in chronic kidney disease   (+) Anemia in chronic kidney disease, on chronic dialysis ([facility])      NEURO/PSYCH   (+) Arm paresthesia, right   (+) Binocular visual disturbance   (+) Cerebellar stroke syndrome   (+) Cerebrovascular accident (CVA) of left pontine structure ([facility])   (+) Diabetic neuropathy ([facility])   (+) Gastroparesis diabeticorum  ([facility])   (+) Generalized anxiety disorder   (+) Moderate episode of recurrent major depressive disorder ([facility])   (+) Non-aneurysmal perimesencephalic subarachnoid hemorrhage ([facility])   (+) Provoked seizure ([facility])      PULMONARY   (+) RACHEAL (obstructive sleep apnea)         TTE 1/5/24:    Left Ventricle: Left ventricular cavity size is normal. Wall thickness is moderately increased. There is moderate concentric hypertrophy. The left ventricular ejection fraction is 70%. Systolic function is vigorous. Wall motion is normal. Diastolic function is normal.    Left Atrium: The atrium is moderately dilated.    Right Atrium: The atrium is mildly dilated.    Atrial Septum: No patent foramen ovale detected, confirmed at rest using color doppler. Limited sensitivity.    Mitral Valve: There is trace regurgitation.    Pericardium: There is a trivial pericardial effusion posterior to the heart. The fluid exhibits no internal echoes.    Normal sinus  rhythm  Nonspecific ST and T wave abnormality  Abnormal ECG  When compared with ECG of 05-JAN-2024 08:01,  No significant change was found  Confirmed by Aime Grey ([id number]) on 1/6/2024 4:10:58 PM

## 2024-08-01 ENCOUNTER — ANESTHESIA EVENT (OUTPATIENT)
Dept: GASTROENTEROLOGY | Facility: HOSPITAL | Age: 39
End: 2024-08-01

## 2024-08-01 ENCOUNTER — RA CDI HCC (OUTPATIENT)
Dept: OTHER | Facility: HOSPITAL | Age: 39
End: 2024-08-01

## 2024-08-01 ENCOUNTER — ANESTHESIA (OUTPATIENT)
Dept: GASTROENTEROLOGY | Facility: HOSPITAL | Age: 39
End: 2024-08-01

## 2024-08-01 ENCOUNTER — APPOINTMENT (OUTPATIENT)
Facility: CLINIC | Age: 39
End: 2024-08-01
Payer: MEDICARE

## 2024-08-01 ENCOUNTER — HOSPITAL ENCOUNTER (OUTPATIENT)
Dept: GASTROENTEROLOGY | Facility: HOSPITAL | Age: 39
Setting detail: OUTPATIENT SURGERY
End: 2024-08-01
Attending: INTERNAL MEDICINE
Payer: MEDICARE

## 2024-08-01 VITALS
RESPIRATION RATE: 18 BRPM | HEIGHT: 71 IN | HEART RATE: 68 BPM | DIASTOLIC BLOOD PRESSURE: 63 MMHG | OXYGEN SATURATION: 100 % | BODY MASS INDEX: 25.9 KG/M2 | TEMPERATURE: 97.2 F | WEIGHT: 185 LBS | SYSTOLIC BLOOD PRESSURE: 137 MMHG

## 2024-08-01 DIAGNOSIS — I16.0 HYPERTENSIVE URGENCY: ICD-10-CM

## 2024-08-01 DIAGNOSIS — I10 UNCONTROLLED HYPERTENSION: ICD-10-CM

## 2024-08-01 DIAGNOSIS — Z86.010 HISTORY OF COLON POLYPS: ICD-10-CM

## 2024-08-01 DIAGNOSIS — D12.6 TUBULOVILLOUS ADENOMA OF COLON: ICD-10-CM

## 2024-08-01 DIAGNOSIS — I10 ESSENTIAL (PRIMARY) HYPERTENSION: Chronic | ICD-10-CM

## 2024-08-01 PROCEDURE — G0105 COLORECTAL SCRN; HI RISK IND: HCPCS | Performed by: INTERNAL MEDICINE

## 2024-08-01 RX ORDER — PROPOFOL 10 MG/ML
INJECTION, EMULSION INTRAVENOUS AS NEEDED
Status: DISCONTINUED | OUTPATIENT
Start: 2024-08-01 | End: 2024-08-01

## 2024-08-01 RX ORDER — SODIUM CHLORIDE, SODIUM LACTATE, POTASSIUM CHLORIDE, CALCIUM CHLORIDE 600; 310; 30; 20 MG/100ML; MG/100ML; MG/100ML; MG/100ML
INJECTION, SOLUTION INTRAVENOUS CONTINUOUS PRN
Status: DISCONTINUED | OUTPATIENT
Start: 2024-08-01 | End: 2024-08-01

## 2024-08-01 RX ORDER — LIDOCAINE HYDROCHLORIDE 10 MG/ML
INJECTION, SOLUTION EPIDURAL; INFILTRATION; INTRACAUDAL; PERINEURAL AS NEEDED
Status: DISCONTINUED | OUTPATIENT
Start: 2024-08-01 | End: 2024-08-01

## 2024-08-01 RX ORDER — SODIUM CHLORIDE, SODIUM LACTATE, POTASSIUM CHLORIDE, CALCIUM CHLORIDE 600; 310; 30; 20 MG/100ML; MG/100ML; MG/100ML; MG/100ML
50 INJECTION, SOLUTION INTRAVENOUS CONTINUOUS
Status: DISPENSED | OUTPATIENT
Start: 2024-08-01

## 2024-08-01 RX ORDER — SODIUM CHLORIDE 9 MG/ML
INJECTION, SOLUTION INTRAVENOUS CONTINUOUS PRN
Status: DISCONTINUED | OUTPATIENT
Start: 2024-08-01 | End: 2024-08-01

## 2024-08-01 RX ADMIN — PROPOFOL 150 MG: 10 INJECTION, EMULSION INTRAVENOUS at 08:48

## 2024-08-01 RX ADMIN — SODIUM CHLORIDE: 0.9 INJECTION, SOLUTION INTRAVENOUS at 08:31

## 2024-08-01 RX ADMIN — PROPOFOL 100 MG: 10 INJECTION, EMULSION INTRAVENOUS at 08:58

## 2024-08-01 RX ADMIN — Medication 40 MG: at 09:00

## 2024-08-01 RX ADMIN — PROPOFOL 100 MG: 10 INJECTION, EMULSION INTRAVENOUS at 08:54

## 2024-08-01 RX ADMIN — PROPOFOL 50 MG: 10 INJECTION, EMULSION INTRAVENOUS at 09:02

## 2024-08-01 RX ADMIN — PROPOFOL 100 MG: 10 INJECTION, EMULSION INTRAVENOUS at 09:00

## 2024-08-01 RX ADMIN — PROPOFOL 50 MG: 10 INJECTION, EMULSION INTRAVENOUS at 08:56

## 2024-08-01 RX ADMIN — PROPOFOL 50 MG: 10 INJECTION, EMULSION INTRAVENOUS at 08:52

## 2024-08-01 RX ADMIN — LIDOCAINE HYDROCHLORIDE 50 MG: 10 INJECTION, SOLUTION EPIDURAL; INFILTRATION; INTRACAUDAL at 08:48

## 2024-08-01 RX ADMIN — PROPOFOL 50 MG: 10 INJECTION, EMULSION INTRAVENOUS at 09:06

## 2024-08-01 NOTE — ANESTHESIA POSTPROCEDURE EVALUATION
Post-Op Assessment Note    CV Status:  Stable  Pain Score: 0    Pain management: adequate       Mental Status:  Sleepy   Hydration Status:  Stable   PONV Controlled:  None   Airway Patency:  Patent     Post Op Vitals Reviewed: Yes    No anethesia notable event occurred.    Staff: LIAM               /56 (08/01/24 0916)    Temp (!) 97.2 °F (36.2 °C) (08/01/24 0916)    Pulse 65 (08/01/24 0916)   Resp 18 (08/01/24 0916)    SpO2 100 % (08/01/24 0916)

## 2024-08-01 NOTE — PROGRESS NOTES
HCC coding opportunities          Chart Reviewed number of suggestions sent to Provider: 3     Patients Insurance     Medicare Insurance: Medicare        [id number]  [id number]  [id number]

## 2024-08-01 NOTE — ANESTHESIA PREPROCEDURE EVALUATION
Procedure:  COLONOSCOPY    Relevant Problems   CARDIO   (+) Coronary artery disease involving native coronary artery of native heart without angina pectoris   (+) Hyperlipidemia   (+) Renovascular hypertension      ENDO   (+) Hypothyroidism   (+) Secondary hyperparathyroidism (HCC)   (+) Type 1 diabetes mellitus (HCC)   (+) Type 1 diabetes mellitus on insulin therapy (HCC)      GI/HEPATIC   (+) Gastroesophageal reflux disease without esophagitis      /RENAL   (+) Anemia due to chronic kidney disease, on chronic dialysis ([facility])   (+) ESRD on hemodialysis (HCC)      HEMATOLOGY   (+) Anemia due to chronic kidney disease, on chronic dialysis ([facility])   (+) Anemia in chronic kidney disease   (+) Anemia in chronic kidney disease, on chronic dialysis ([facility])      NEURO/PSYCH   (+) Arm paresthesia, right   (+) Binocular visual disturbance   (+) Cerebellar stroke syndrome   (+) Cerebrovascular accident (CVA) of left pontine structure ([facility])   (+) Diabetic neuropathy ([facility])   (+) Gastroparesis diabeticorum  ([facility])   (+) Generalized anxiety disorder   (+) Moderate episode of recurrent major depressive disorder ([facility])   (+) Non-aneurysmal perimesencephalic subarachnoid hemorrhage ([facility])   (+) Provoked seizure (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)      Other   (+) Ataxia   (+) Dyslipidemia        Physical Exam    Airway    Mallampati score: II  TM Distance: >3 FB  Neck ROM: full     Dental       Cardiovascular  Cardiovascular exam normal    Pulmonary  Pulmonary exam normal     Other Findings    TTE 1/5/24:    Left Ventricle: Left ventricular cavity size is normal. Wall thickness is moderately increased. There is moderate concentric hypertrophy. The left ventricular ejection fraction is 70%. Systolic function is  vigorous. Wall motion is normal. Diastolic function is normal.    Left Atrium: The atrium is moderately dilated.    Right Atrium: The atrium is mildly dilated.    Atrial Septum: No patent foramen ovale detected, confirmed at rest using color doppler. Limited sensitivity.    Mitral Valve: There is trace regurgitation.    Pericardium: There is a trivial pericardial effusion posterior to the heart. The fluid exhibits no internal echoes.    Normal sinus rhythm  Nonspecific ST and T wave abnormality  Abnormal ECG  When compared with ECG of 05-JAN-2024 08:01,  No significant change was found  Confirmed by Aime Grey ([id number]) on 1/6/2024 4:10:58 PM               Anesthesia Plan  ASA Score- 3     Anesthesia Type- IV sedation with anesthesia with ASA Monitors.         Additional Monitors:     Airway Plan:            Plan Factors-Exercise tolerance (METS): >4 METS.    Chart reviewed. EKG reviewed.  Existing labs reviewed. Patient summary reviewed.    Patient is not a current smoker. Patient not instructed to abstain from smoking on day of procedure. Patient did not smoke on day of surgery.            Induction-     Postoperative Plan-     Perioperative Resuscitation Plan - Level 1 - Full Code.       Informed Consent- Anesthetic plan and risks discussed with patient.  I personally reviewed this patient with the CRNA. Discussed and agreed on the Anesthesia Plan with the CRNA..

## 2024-08-01 NOTE — H&P
History and Physical -  [facility]  Mateus Mckeon 38 y.o. male MRN: [id number]    HPI: Mateus Mckeon is a 38 y.o. year old male who presents with hx of colon polyps      Review of Systems    Historical Information   Past Medical History:   Diagnosis Date    Acute kidney injury (HCC)     Ambulates with cane     Anuria     Anxiety     Cellulitis of right elbow 03/31/2021    Chronic kidney disease     Depression     Diabetes mellitus (HCC)     Diarrhea     Emesis 10/24/2020    End stage renal disease (HCC) 02/11/2018    Formatting of this note might be different from the original. Last Assessment & Plan:  Secondary to DM.  On nightly PD.  Followed by [facility].  Patient considering transplant for kidney and pancreas through [facility] Formatting of this note might be different from the original. Last Assessment & Plan:  Formatting of this note might be different from the original. 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    Esophagitis 07/21/2015    Falls     Gastroparesis     GERD (gastroesophageal reflux disease)     History of shingles 2010    History of transfusion 02/2018    no adverse reaction    Hyperlipidemia     Hyperphosphatemia     Hypertension     Hypoglycemia 07/15/2022    Itching     Mastoiditis of right side 07/15/2022    Muscle weakness     general unsteadiness    Obesity (BMI 30.0-34.9) 09/09/2019    Orthostatic hypotension 10/25/2020    Peripheral polyneuropathy 11/20/2019    PONV (postoperative nausea and vomiting) 01/26/2018    Protein-calorie malnutrition (HCC) 11/23/2020    Recurrent peritonitis (HCC) due to peritoneal dialysis catheter 07/31/2020    Retinopathy     Seizures ([facility])     early 2020 - one time    Skin abnormality     some dime size areas where skin was scratched from itching    Spontaneous bacterial peritonitis (HCC) 10/19/2020    Squamous cell skin cancer     left temple    Stroke (HCC)     x2 - off balance/no  driving/fatigue    Swelling of both lower extremities     Traumatic onycholysis 07/21/2022    Vomiting     Wears glasses      Past Surgical History:   Procedure Laterality Date    CARDIAC ELECTROPHYSIOLOGY PROCEDURE N/A 9/21/2023    Procedure: Cardiac loop recorder explant;  Surgeon: Parish Morgan MD;  [facility]: [facility];  Service: Cardiology    CARDIAC LOOP RECORDER  05/2018    COLONOSCOPY      EGD      EYE SURGERY Right     IR AV FISTULAGRAM/GRAFTOGRAM  02/23/2021    IR CEREBRAL ANGIOGRAPHY  1/12/2024    IR CEREBRAL ANGIOGRAPHY / INTERVENTION  1/5/2024    IR TUNNELED CENTRAL LINE PLACEMENT  02/16/2021    IR TUNNELED DIALYSIS CATHETER PLACEMENT  11/18/2020    IR TUNNELED DIALYSIS CATHETER REMOVAL  02/12/2021    IR TUNNELED DIALYSIS CATHETER REMOVAL  03/11/2021    MOHS SURGERY Left 12/14/2022    Left temple with Dr. Hassan    PERITONEAL CATHETER INSERTION N/A 08/27/2018    Procedure: UNROOF PD CATHETER;  Surgeon: Felipe Lindo DO;  Location: [facility];  Service: General    KS ARTERIOVENOUS ANASTOMOSIS OPEN DIRECT Left 11/09/2020    Procedure: CREATION FISTULA  ARTERIOVENOUS (AV) - LEFT WRIST;  Surgeon: Placido Altamirano MD;  Location: [facility];  Service: Vascular    KS ESOPHAGOGASTRODUODENOSCOPY TRANSORAL DIAGNOSTIC N/A 04/18/2019    Procedure: ESOPHAGOGASTRODUODENOSCOPY (EGD);  Surgeon: Ale Figueroa MD;  Location: [facility];  Service: Gastroenterology    KS LAPS INSERTION TUNNELED INTRAPERITONEAL CATHETER N/A 08/06/2018    Procedure: LAPAROSCOPIC PD CATHETER PLACEMENT;  Surgeon: Felipe Lindo DO;  [facility];  Service: General    KS REMOVAL TUNNELED INTRAPERITONEAL CATHETER N/A 11/18/2020    Procedure: REMOVAL CATHETER PERITONEAL DIALYSIS;  Surgeon: Abdifatah Ty MD;  [facility];  Service: General    TONSILLECTOMY      UPPER GASTROINTESTINAL ENDOSCOPY       Social History   Social History     Substance and Sexual Activity   Alcohol Use Not Currently     Social History      Substance and Sexual Activity   Drug Use Yes    Types: Marijuana    Comment: medical marijuana     Social History     Tobacco Use   Smoking Status Former    Current packs/day: 0.00    Average packs/day: 0.5 packs/day for 12.0 years (6.0 ttl pk-yrs)    Types: Cigarettes    Start date: 2006    Quit date: 2018    Years since quittin.5   Smokeless Tobacco Never   Tobacco Comments    quit 2018     Family History   Problem Relation Age of Onset    Breast cancer Mother     Hypertension Mother     Hyperlipidemia Father     Hypertension Father     Leukemia Maternal Grandmother     Hyperlipidemia Maternal Grandfather     Hypertension Maternal Grandfather     Hyperlipidemia Paternal Grandmother     Hypertension Paternal Grandmother     Heart disease Paternal Grandfather         cardiac disorder    Diabetes Paternal Grandfather        Meds/Allergies     Not in a hospital admission.    Allergies   Allergen Reactions    Sulfa Antibiotics Rash       Objective     There were no vitals taken for this visit.      PHYSICAL EXAM    Gen: NAD  CV: RRR  CHEST: Clear  ABD: soft, NT/ND  EXT: no edema  Neuro: AAO      ASSESSMENT/PLAN:  This is a 38 y.o. year old male here for hx of colon polyps    PLAN:   Procedure: colonoscopy

## 2024-08-02 ENCOUNTER — OFFICE VISIT (OUTPATIENT)
Facility: CLINIC | Age: 39
End: 2024-08-02
Payer: MEDICARE

## 2024-08-02 DIAGNOSIS — I63.9 ACUTE CVA (CEREBROVASCULAR ACCIDENT) (HCC): Primary | ICD-10-CM

## 2024-08-02 DIAGNOSIS — I63.89 CEREBROVASCULAR ACCIDENT (CVA) DUE TO OTHER MECHANISM (HCC): Primary | ICD-10-CM

## 2024-08-02 PROCEDURE — 97110 THERAPEUTIC EXERCISES: CPT | Performed by: OCCUPATIONAL THERAPIST

## 2024-08-02 PROCEDURE — 97112 NEUROMUSCULAR REEDUCATION: CPT | Performed by: OCCUPATIONAL THERAPIST

## 2024-08-02 PROCEDURE — 97110 THERAPEUTIC EXERCISES: CPT

## 2024-08-02 NOTE — PROGRESS NOTES
Daily Note     Today's date: 2024  Patient name: Mateus Mckeon  : 1985  MRN: [id number]  Referring provider: Dania Sher DO  Dx:   Encounter Diagnosis     ICD-10-CM    1. Acute CVA (cerebrovascular accident) (HCC)  I63.9                     POC expires Auth Status Total   Visits  Start date  Expiration date PT/OT + Visit Limit? Co-Insurance   24     bomn                                                Subjective: Patient reports to PT session from OT stating he does not feel great, stating he is very tired. Had a colonoscopy done yesterday. He did not eat before his session today and was supplemented with apple sauce by OT, reporting some improvement in symptoms. He has dialysis later today.    Objective: See treatment diary below  *FLUID RESTRICTION- do not offer water*     BP start of session: 142/82 mmHg (RUE, seated)    TE: (3# ankle weights - SoloStep)  - STS from standard chair to fatigue  - Seated LAQs x 40 reps B/L  - Seated ball squeeze/hip adduction x 40 reps  - FWD step taps x 1 minute; no UE  - LAT step taps x 1 minute B/L; no UE  - Seated marching x 20 reps B/L    BP: 112/80 mmHg post-session (RUE, seated)      Assessment: Patient tolerated treatment fairly today with modification of exercises this date due to increased levels of fatigue and general malaise. He fatigued most quickly with standing exercises, thus majority of exercises performed in sitting this date. Opted to end session early due to general fatigue and to avoid further exacerbation of symptoms before patient has to report to dialysis, with patient in agreement. Patient would benefit from continued PT to improve balance, endurance, and reduce fall risk.       Plan: Continue per plan of care.  Gait belt throughout session. Continue to incorporate more balance activities on foam surfaces             Outcome Measures Initial Eval  24 Re-eval  24 PN  2024 RE  2024 RE   24 RE  7/15/24   5xSTS  31.25 sec 52.20 sec no UE's    (1 LOB) 19.91 sec no UE 20.14 sec  No UE  15.84 sec  No UE 11.34 sec  No UE   TUG 16.3 sec with rollator    23.6 sec no AD 20.68 sec with rollator    18.44 sec no AD 23.41 sec with rollator    12.92 sec no AD 19.69 sec with rollator    12.45 sec no AD  15.79 secs with rollator    11.54 secs no AD 14.37 sec, w/ rollator      10.62 sec, no AD   10 meter  Next visit> 0.88 m/s 1.08 m/s Self selected:  .80 m/s  Fast: 1.29 m/s 1.16 self selected pace   MEDRANO  Next visit> 44/56 48/56 48/56 47/56   FGA 12/30 Next visit> 12/30 12/30 14/30 16/30   6MWT 900 ft 710 ft  Rollator  760 ft no  ft  No AD  885 ft no AD 1050 ft no AD   mCTSIB  FTEO firm  FTEC firm  FTEO foam  FTEC foam   Next visit> 30 sec  30 sec  5.5 sec  defer 30 sec  30 sec  30 sec  1 sec 30 sec  30 sec  9 secs  Unable to maintain 30 sec  30 sec  30 sec +  3 sec   ABC  65% 43.75% 56.88% 61.88% 58.75%

## 2024-08-02 NOTE — PROGRESS NOTES
"Daily Note     Today's date: 2024  Patient name: Mateus Mckeon  : 1985  MRN: [id number]  Referring provider: Dania Sher DO  Dx:   Encounter Diagnosis     ICD-10-CM    1. Cerebrovascular accident (CVA) due to other mechanism (HCC)  I63.89             Start Time: 0800  Stop Time: 0845  Total time in clinic (min): 45 minutes    PLAN OF CARE START: 24  PLAN OF CARE END: 2024  FREQUENCY: 2 times per week  PRECAUTIONS: Renal failure, actively going through dialysis; type 1 diabetes; HTN; SAH; seizure (last one was 2019)  ON FLUID RESTRICTIONS--DO NOT OFFER WATER    Subjective: \"I feel tired today, I had a colonoscopy yesterday\"    Objective: See treatment diary below  ON FLUID RESTRICTIONS--DO NOT OFFER WATER    TE:   Performed 3x10 bilateral ER with red theraband with 1-2 minute rest breaks provided.     NMR:  -Nustep preformed to improve endurance, UE strength, ROM, and proprioceptive input. Performed for 5 minutes.   -Practicing tying shoe laces and lacing pillow to work on improved success shoe tying, bilateral coordination, dexterity, finger isolation. Video provided to practice technique at home.   -Beading activity with in hand manipulation challenge to  3-4 beads with RUE at a time to utilize in hand manipulation skills needed for shoe tying. Completed to address b/l coordination, FMC, dexterity.     At end of session pt reporting dizziness, and did not eat breakfast this morning. Therapist offered a snack, pt reported he is diabetic and did not have any insulin with him but could eat applesauce. Pt had apple sauce, and transitioned to PT session.     BP: attempted to check 2x, unable to hear pulse  Pulse: 84  Oxygen: 98    Assessment: Pt tolerated session fairly today. Continues to demonstrate impaired R hand FMC, limited endurance. Pt would benefit from continued skilled occupational therapy services to improve fine motor coordination, dexterity, stregnth, UE function, " and functional cognition.    Plan: Continue per plan of care.     SHORT TERM GOALS ADDED 2/20/24:  Pt will increase sustained attention by completing trail making part A in 35 seconds to complete IADLs NOT MET  Pt will increase divided attention by completing trail making part B in 1 minute 30 seconds to complete IADLs NOT MET  Pt will increase delayed recall and recall 4 /5 items on MOCA to complete IADLs GOAL MET    LONG TERM GOALS ADDED 2/20/24:  Pt will increase sustained attention by completing trail making part A in 38 seconds to complete IADLs  Pt will increase divided attention by completing trail making part B in 1 minute 10 seconds to complete IADLs  Pt will increase delayed recall and recall 5/5 items on MOCA to complete IADLs  Resume right  hand dominance to refined functional assist with all activities of daily living

## 2024-08-05 ENCOUNTER — OFFICE VISIT (OUTPATIENT)
Facility: CLINIC | Age: 39
End: 2024-08-05
Payer: MEDICARE

## 2024-08-05 DIAGNOSIS — I63.89 CEREBROVASCULAR ACCIDENT (CVA) DUE TO OTHER MECHANISM (HCC): Primary | ICD-10-CM

## 2024-08-05 PROCEDURE — 97110 THERAPEUTIC EXERCISES: CPT

## 2024-08-05 PROCEDURE — 97530 THERAPEUTIC ACTIVITIES: CPT

## 2024-08-05 NOTE — PROGRESS NOTES
Daily Note     Today's date: 2024  Patient name: Mateus Mckeon  : 1985  MRN: [id number]  Referring provider: Dania Sher DO  Dx:   Encounter Diagnosis     ICD-10-CM    1. Cerebrovascular accident (CVA) due to other mechanism (HCC)  I63.89               Start Time: 0800  Stop Time: 0845  Total time in clinic (min): 45 minutes    PLAN OF CARE START: 24  PLAN OF CARE END: 2024  FREQUENCY: 2 times per week  PRECAUTIONS: Renal failure, actively going through dialysis; type 1 diabetes; HTN; SAH; seizure (last one was 2019)  ON FLUID RESTRICTIONS--DO NOT OFFER WATER    Subjective: Pt states his left arm was sore today   Pt states he would like to work on his handwriting so he can take notes again.     Objective: See treatment diary below  ON FLUID RESTRICTIONS--DO NOT OFFER WATER    TE:   Performed 2x20 R UE ER with red theraband with 1 minute rest breaks provided.   3x10 seated single arm  press with the R UE using 2 lb weight (shoulder flexion, elbow extension, GH rotation)  Standing bend over rows with dumbbells with the R UE. 1 set of 15 with a 5 lb dumb bell, then 1 set of 10 with a 7 lb dumbbell.   IR/ER with pt holding hammer with shoulder at 90 degrees flexion and elbow in full extension, 2x10  Pronation/supination with pt holding hammer with shoulder in neutral elbow at 90 degrees flexion. 2x10    TA:   -Performed tennis ball squeeze to push pegs into hole, ~8 reps then unable to continues due to hand fatigue.   -Performed handwriting exercises with large  pen to work on fine motor control. Pt was about 80% accurate with staying in lines during the exercise.     Assessment: Pt tolerated session well today. Continues to demonstrate impaired R hand FMC, limited endurance. Pt would benefit from continued skilled occupational therapy services to improve fine motor coordination, dexterity, stregnth, UE function, and functional cognition.    Plan: Continue per plan of  care.     SHORT TERM GOALS ADDED 2/20/24:  Pt will increase sustained attention by completing trail making part A in 35 seconds to complete IADLs NOT MET  Pt will increase divided attention by completing trail making part B in 1 minute 30 seconds to complete IADLs NOT MET  Pt will increase delayed recall and recall 4 /5 items on MOCA to complete IADLs GOAL MET    LONG TERM GOALS ADDED 2/20/24:  Pt will increase sustained attention by completing trail making part A in 38 seconds to complete IADLs  Pt will increase divided attention by completing trail making part B in 1 minute 10 seconds to complete IADLs  Pt will increase delayed recall and recall 5/5 items on MOCA to complete IADLs  Resume right  hand dominance to refined functional assist with all activities of daily living

## 2024-08-07 ENCOUNTER — OFFICE VISIT (OUTPATIENT)
Facility: CLINIC | Age: 39
End: 2024-08-07
Payer: MEDICARE

## 2024-08-07 DIAGNOSIS — I63.89 CEREBROVASCULAR ACCIDENT (CVA) DUE TO OTHER MECHANISM (HCC): Primary | ICD-10-CM

## 2024-08-07 DIAGNOSIS — I63.9 ACUTE CVA (CEREBROVASCULAR ACCIDENT) (HCC): Primary | ICD-10-CM

## 2024-08-07 PROCEDURE — 97530 THERAPEUTIC ACTIVITIES: CPT

## 2024-08-07 PROCEDURE — 97112 NEUROMUSCULAR REEDUCATION: CPT

## 2024-08-07 PROCEDURE — 97110 THERAPEUTIC EXERCISES: CPT

## 2024-08-07 NOTE — PROGRESS NOTES
Daily Note     Today's date: 2024  Patient name: Mateus Mckeon  : 1985  MRN: [id number]  Referring provider: Dania Sher DO  Dx:   Encounter Diagnosis     ICD-10-CM    1. Cerebrovascular accident (CVA) due to other mechanism (HCC)  I63.89         Start Time: 0800  Stop Time: 0845  Total time in clinic (min): 45 minutes    PLAN OF CARE START: 24  PLAN OF CARE END: 2024  FREQUENCY: 2 times per week  PRECAUTIONS: Renal failure, actively going through dialysis; type 1 diabetes; HTN; SAH; seizure (last one was 2019)  ON FLUID RESTRICTIONS--DO NOT OFFER WATER    Subjective: Pt states he took his BP medication this morning too early, as he is supposed to take it after his therapy appointment.       Objective: See treatment diary below  ON FLUID RESTRICTIONS--DO NOT OFFER WATER    BP: 152/90 at start of session in seated. No symptoms reported.     TE:   Performed 3x10 R UE ER with red theraband.   Green flex bar bends 30x up and 30x down to work on UE strength and motor control.     NMR:   Dexterciser with shoulder at ~90 degrees flexion and elbow in full extension to work on R UE endurance, motor control.    marbles with blue clothes pin for ~ 6 reps then downgrading to green for another ~15 reps to work on pinch strength, graded control.   Nut and bolt board for 5 pieces to work on dexterity, finger isolation.   Wrap rubber band around cylinder using 1 hand to work on FMC, manipulation, finger extension.       Assessment: Pt tolerated session well today. Performed all exercises in seated. Continues to demonstrate impaired R hand FMC, limited endurance. Pt would benefit from continued skilled occupational therapy services to improve fine motor coordination, dexterity, stregnth, UE function, and functional cognition.    Plan: Continue per plan of care.     SHORT TERM GOALS ADDED 24:  Pt will increase sustained attention by completing trail making part A in 35 seconds to  complete IADLs NOT MET  Pt will increase divided attention by completing trail making part B in 1 minute 30 seconds to complete IADLs NOT MET  Pt will increase delayed recall and recall 4 /5 items on MOCA to complete IADLs GOAL MET    LONG TERM GOALS ADDED 2/20/24:  Pt will increase sustained attention by completing trail making part A in 38 seconds to complete IADLs  Pt will increase divided attention by completing trail making part B in 1 minute 10 seconds to complete IADLs  Pt will increase delayed recall and recall 5/5 items on MOCA to complete IADLs  Resume right  hand dominance to refined functional assist with all activities of daily living

## 2024-08-08 ENCOUNTER — OFFICE VISIT (OUTPATIENT)
Dept: INTERNAL MEDICINE CLINIC | Facility: CLINIC | Age: 39
End: 2024-08-08
Payer: MEDICARE

## 2024-08-08 VITALS
OXYGEN SATURATION: 99 % | RESPIRATION RATE: 16 BRPM | HEIGHT: 71 IN | BODY MASS INDEX: 26.74 KG/M2 | DIASTOLIC BLOOD PRESSURE: 84 MMHG | WEIGHT: 191 LBS | SYSTOLIC BLOOD PRESSURE: 128 MMHG | HEART RATE: 77 BPM

## 2024-08-08 DIAGNOSIS — N18.6 ESRD ON HEMODIALYSIS (HCC): Chronic | ICD-10-CM

## 2024-08-08 DIAGNOSIS — I15.0 RENOVASCULAR HYPERTENSION: Primary | Chronic | ICD-10-CM

## 2024-08-08 DIAGNOSIS — Z99.2 ESRD ON HEMODIALYSIS (HCC): Chronic | ICD-10-CM

## 2024-08-08 PROCEDURE — 99214 OFFICE O/P EST MOD 30 MIN: CPT | Performed by: INTERNAL MEDICINE

## 2024-08-08 PROCEDURE — G2211 COMPLEX E/M VISIT ADD ON: HCPCS | Performed by: INTERNAL MEDICINE

## 2024-08-08 RX ORDER — NIFEDIPINE 90 MG/1
90 TABLET, EXTENDED RELEASE ORAL DAILY
COMMUNITY

## 2024-08-08 RX ORDER — OLMESARTAN MEDOXOMIL 40 MG/1
40 TABLET ORAL DAILY
COMMUNITY

## 2024-08-08 NOTE — PROGRESS NOTES
Ambulatory Visit  Name: Mateus Mckeon      : 1985      MRN: [id number]  Encounter Provider: Dania Sher DO  Encounter Date: 2024   Encounter department: [facility] INTERNAL MEDICINE    Assessment & Plan   1. Renovascular hypertension  Assessment & Plan:  -he has been experiencing hypotensive episodes with c/o dizziness and weakness  -now off hydralazine 100mg TID.  Nifedipine reduced to 90mg daily.   Lisinopril 40mg changed to olmesartan 40mg daily with fewer dizziness episodes.  Advised may do trial of lowered olmesartan to 20mg daily.  He remains on labetalol 400mg TID  -advised follow up with nephro  2. ESRD on hemodialysis ([facility])     History of Present Illness     HPI  He presents with his mother.    He is having a hard time with his blood pressure.  Bill has been eliminating pills and BP still low.  He has been unable to complete PT because he was feeling week and dizzy.  BP have been <100/-,       Now going to [facility] with a new nephrologist.  He was started on olmesartan 40mg daily.  Nifedipine was adjusted from 120mg to 90mg daily and hydralazine was discontinued.    Home /- and standing /-.  He notices he gets 120/--    Review of Systems   Constitutional:  Positive for fatigue.   Cardiovascular:  Negative for palpitations.   Neurological:  Positive for dizziness, weakness (generalized), light-headedness and headaches (occasional).     Medical History Reviewed by provider this encounter:  Tobacco  Allergies  Meds  Problems  Med Hx  Surg Hx  Fam Hx       Current Outpatient Medications on File Prior to Visit   Medication Sig Dispense Refill   • aspirin (ECOTRIN LOW STRENGTH) 81 mg EC tablet Take 81 mg by mouth daily     • atorvastatin (LIPITOR) 40 mg tablet Take 1 tablet (40 mg total) by mouth every evening 90 tablet 3   • b complex vitamins capsule Take 1 capsule by mouth daily before lunch      • calcium acetate (PHOSLO) capsule Take 1 capsule  (667 mg total) by mouth 3 (three) times a day 90 capsule 0   • cinacalcet (SENSIPAR) 60 MG tablet Take 1 tablet (60 mg total) by mouth daily 30 tablet 0   • escitalopram (LEXAPRO) 20 mg tablet Take 1 tablet (20 mg total) by mouth daily 90 tablet 2   • famotidine (PEPCID) 40 MG tablet TAKE 1 TABLET BY MOUTH IN THE MORNING 90 tablet 1   • gabapentin (NEURONTIN) 100 mg capsule TAKE 1 CAPSULE BY MOUTH IN THE MORNING AND 2 CAPSULES AT BEDTIME. 90 capsule 3   • GLUCAGON EMERGENCY 1 MG injection   3   • Gvoke HypoPen 1-Pack 1 MG/0.2ML SOAJ      • Insulin Disposable Pump (Omnipod 5 G6 Intro, Gen 5,) KIT      • Insulin Disposable Pump (Omnipod 5 G6 Pod, Gen 5,) MISC      • labetalol (NORMODYNE) 200 mg tablet Take 2 tablets (400 mg total) by mouth 3 (three) times a day 180 tablet 0   • metoclopramide (REGLAN) 5 mg tablet Take 1 tablet by mouth three times daily as needed 90 tablet 0   • NIFEdipine (PROCARDIA XL) 90 mg 24 hr tablet Take 90 mg by mouth daily     • NovoLOG 100 UNIT/ML injection      • olmesartan (BENICAR) 40 mg tablet Take 40 mg by mouth daily     • ondansetron (ZOFRAN) 4 mg tablet Take 1 tablet (4 mg total) by mouth every 8 (eight) hours as needed for nausea or vomiting 90 tablet 1   • Patiromer Sorbitex Calcium (VELTASSA PO) Take 5 g by mouth once a week     • SPS 15 GM/60ML suspension TAKE 60 ML BY MOUTH SINGLE DOSE FOR EMERGENCY USE DURING MISSED HEMODIALYSIS     • traZODone (DESYREL) 50 mg tablet TAKE 1/2 (ONE-HALF) TABLET BY MOUTH ONCE DAILY AT BEDTIME AS NEEDED FOR SLEEP 30 tablet 2   • hydrALAZINE (APRESOLINE) 100 MG tablet Take 1 tablet (100 mg total) by mouth 3 (three) times a day (Patient not taking: Reported on 7/10/2024) 90 tablet 0   • hydrOXYzine HCL (ATARAX) 10 mg tablet Take 1 tablet (10 mg total) by mouth every 6 (six) hours as needed for itching or anxiety (Patient not taking: Reported on 7/10/2024) 30 tablet 3   • saccharomyces boulardii (FLORASTOR) 250 mg capsule Take 250 mg by mouth daily  "(Patient not taking: Reported on 2024)     • [DISCONTINUED] cloNIDine (CATAPRES-TTS-3) 0.3 mg/24 hr 1 patch once a week (Patient not taking: Reported on 2024)     • [DISCONTINUED] NIFEdipine (PROCARDIA XL) 30 mg 24 hr tablet Take 2 tablets (60 mg total) by mouth 2 (two) times a day (Patient not taking: Reported on 2024) 120 tablet 0     No current facility-administered medications on file prior to visit.      Social History     Tobacco Use   • Smoking status: Former     Current packs/day: 0.00     Average packs/day: 0.5 packs/day for 12.0 years (6.0 ttl pk-yrs)     Types: Cigarettes     Start date: 2006     Quit date: 2018     Years since quittin.5   • Smokeless tobacco: Never   • Tobacco comments:     quit 2018   Vaping Use   • Vaping status: Every Day   • Substances: THC, CBD   Substance and Sexual Activity   • Alcohol use: Not Currently   • Drug use: Yes     Types: Marijuana     Comment: medical marijuana last vaped 2024   • Sexual activity: Not on file     Objective     /84 (BP Location: Left arm, Patient Position: Sitting, Cuff Size: Large)   Pulse 77   Resp 16   Ht 5' 11\" (1.803 m)   Wt 86.6 kg (191 lb)   SpO2 99%   BMI 26.64 kg/m²     Physical Exam  Vitals reviewed.   Cardiovascular:      Rate and Rhythm: Normal rate and regular rhythm.      Pulses: Normal pulses.      Heart sounds: Normal heart sounds.   Pulmonary:      Effort: Pulmonary effort is normal. No respiratory distress.      Breath sounds: Normal breath sounds. No wheezing.   Musculoskeletal:      Right lower leg: No edema.      Left lower leg: No edema.   Neurological:      Mental Status: He is alert.       Recent Results (from the past 336 hour(s))   TSH, 3rd generation with Free T4 reflex    Collection Time: 24  8:08 AM   Result Value Ref Range    TSH 3RD GENERATON 3.088 0.450 - 4.500 uIU/mL       Administrative Statements           "

## 2024-08-09 NOTE — ASSESSMENT & PLAN NOTE
-he has been experiencing hypotensive episodes with c/o dizziness and weakness  -now off hydralazine 100mg TID.  Nifedipine reduced to 90mg daily.   Lisinopril 40mg changed to olmesartan 40mg daily with fewer dizziness episodes.  Advised may do trial of lowered olmesartan to 20mg daily.  He remains on labetalol 400mg TID  -advised follow up with nephro

## 2024-08-12 ENCOUNTER — OFFICE VISIT (OUTPATIENT)
Facility: CLINIC | Age: 39
End: 2024-08-12
Payer: MEDICARE

## 2024-08-12 DIAGNOSIS — I63.89 CEREBROVASCULAR ACCIDENT (CVA) DUE TO OTHER MECHANISM (HCC): Primary | ICD-10-CM

## 2024-08-12 PROCEDURE — 97110 THERAPEUTIC EXERCISES: CPT

## 2024-08-12 PROCEDURE — 97112 NEUROMUSCULAR REEDUCATION: CPT

## 2024-08-12 NOTE — PROGRESS NOTES
Daily Note     Today's date: 2024  Patient name: Mateus Mckeon  : 1985  MRN: [id number]  Referring provider: Dania Sher DO  Dx:   Encounter Diagnosis     ICD-10-CM    1. Cerebrovascular accident (CVA) due to other mechanism (HCC)  I63.89         Start Time: 0803  Stop Time: 0845  Total time in clinic (min): 42 minutes    PLAN OF CARE START: 24  PLAN OF CARE END: 2024  FREQUENCY: 2 times per week  PRECAUTIONS: Renal failure, actively going through dialysis; type 1 diabetes; HTN; SAH; seizure (last one was 2019)  ON FLUID RESTRICTIONS--DO NOT OFFER WATER    Subjective: Pt states he took his BP medication this morning too early, as he is supposed to take it after his therapy appointment.       Objective: See treatment diary below  ON FLUID RESTRICTIONS--DO NOT OFFER WATER    TE: Performed the following exercises to work on R UE strength and motor control required for IADL performance  Rows 2x15 with blue Theraband  Punches 2x15 with blue Theraband  Reverse fly's 2x15 with blue Theraband    Green flex bar bends 30x up and 30x down to work on UE strength and motor control.     NMR:   Dexterciser with shoulder at ~90 degrees flexion and elbow in full extension to work on R UE endurance, motor control.   Peg brite board with pt copying dolphin picture. Picking up two pieces at a time to work on FMC, dexterity. 1 lb wrist weight donned to provide additional proprioceptive input. Mild difficulty with perceptual piece of the design reported by the patient.       Assessment: Pt tolerated session well today. Performed all exercises in seated. Continues to demonstrate impaired R hand FMC, limited endurance. Pt noted to have difficulty with visual  Pt would benefit from continued skilled occupational therapy services to improve fine motor coordination, dexterity, stregnth, UE function, and functional cognition.    Plan: Continue per plan of care.     SHORT TERM GOALS ADDED 24:  Pt will  increase sustained attention by completing trail making part A in 35 seconds to complete IADLs NOT MET  Pt will increase divided attention by completing trail making part B in 1 minute 30 seconds to complete IADLs NOT MET  Pt will increase delayed recall and recall 4 /5 items on MOCA to complete IADLs GOAL MET    LONG TERM GOALS ADDED 2/20/24:  Pt will increase sustained attention by completing trail making part A in 38 seconds to complete IADLs  Pt will increase divided attention by completing trail making part B in 1 minute 10 seconds to complete IADLs  Pt will increase delayed recall and recall 5/5 items on MOCA to complete IADLs  Resume right  hand dominance to refined functional assist with all activities of daily living

## 2024-08-14 ENCOUNTER — OFFICE VISIT (OUTPATIENT)
Facility: CLINIC | Age: 39
End: 2024-08-14
Payer: MEDICARE

## 2024-08-14 DIAGNOSIS — I63.89 CEREBROVASCULAR ACCIDENT (CVA) DUE TO OTHER MECHANISM (HCC): Primary | ICD-10-CM

## 2024-08-14 PROCEDURE — 97112 NEUROMUSCULAR REEDUCATION: CPT

## 2024-08-14 NOTE — PROGRESS NOTES
Daily Note     Today's date: 2024  Patient name: Mateus Mckeon  : 1985  MRN: [id number]  Referring provider: Dania Sher DO  Dx:   Encounter Diagnosis     ICD-10-CM    1. Cerebrovascular accident (CVA) due to other mechanism (HCC)  I63.89         Start Time: 0850  Stop Time: 0930  Total time in clinic (min): 40 minutes    PLAN OF CARE START: 24  PLAN OF CARE END: 2024  FREQUENCY: 2 times per week  PRECAUTIONS: Renal failure, actively going through dialysis; type 1 diabetes; HTN; SAH; seizure (last one was 2019)  ON FLUID RESTRICTIONS--DO NOT OFFER WATER    Therapist was 5 minutes late due to staff meeting.     Subjective: Pt reports no changes.       Objective: See treatment diary below  ON FLUID RESTRICTIONS--DO NOT OFFER WATER    Discussed and educated on service animal/dog and pt may benefit     TE: Performed the following exercises to work on R UE strength and motor control required for IADL performance    red flex bar bends 30x up and 30x down to work on UE strength and motor control.     NMR:   Performed FMC task of bananagrams using RUE for FMC, dexterity, - completed with facility dog and utility vest - completed word [place] with missing letter   Completed peg brite task of FMC for 6-7 minutes with RUE focusing on FMC and dexterity.   Completed double spot task focusing on FMC, dexterity with RUE.      Assessment: Pt tolerated session well today. Performed all exercises in seated. Continues to demonstrate impaired R hand FMC, limited endurance.  Pt reports increased fatigue after exerceis.  Pt would benefit from continued skilled occupational therapy services to improve fine motor coordination, dexterity, stregnth, UE function, and functional cognition.    Plan: Continue per plan of care.     SHORT TERM GOALS ADDED 24:  Pt will increase sustained attention by completing trail making part A in 35 seconds to complete IADLs NOT MET  Pt will increase divided attention by  completing trail making part B in 1 minute 30 seconds to complete IADLs NOT MET  Pt will increase delayed recall and recall 4 /5 items on MOCA to complete IADLs GOAL MET    LONG TERM GOALS ADDED 2/20/24:  Pt will increase sustained attention by completing trail making part A in 38 seconds to complete IADLs  Pt will increase divided attention by completing trail making part B in 1 minute 10 seconds to complete IADLs  Pt will increase delayed recall and recall 5/5 items on MOCA to complete IADLs  Resume right  hand dominance to refined functional assist with all activities of daily living

## 2024-08-19 ENCOUNTER — APPOINTMENT (OUTPATIENT)
Facility: CLINIC | Age: 39
End: 2024-08-19
Payer: MEDICARE

## 2024-08-20 ENCOUNTER — OFFICE VISIT (OUTPATIENT)
Facility: CLINIC | Age: 39
End: 2024-08-20
Payer: MEDICARE

## 2024-08-20 DIAGNOSIS — I63.89 CEREBROVASCULAR ACCIDENT (CVA) DUE TO OTHER MECHANISM (HCC): Primary | ICD-10-CM

## 2024-08-20 DIAGNOSIS — I63.9 ACUTE CVA (CEREBROVASCULAR ACCIDENT) (HCC): Primary | ICD-10-CM

## 2024-08-20 PROCEDURE — 97110 THERAPEUTIC EXERCISES: CPT

## 2024-08-20 PROCEDURE — 97530 THERAPEUTIC ACTIVITIES: CPT

## 2024-08-20 NOTE — PROGRESS NOTES
OT Daily Note     Today's date: 2024  Patient name: Mateus Mckeon  : 1985  MRN: [id number]  Referring provider: Dania Sher DO  Dx:   Encounter Diagnosis     ICD-10-CM    1. Cerebrovascular accident (CVA) due to other mechanism (HCC)  I63.89           Start Time: 0845  Stop Time: 0930  Total time in clinic (min): 45 minutes    PLAN OF CARE START: 24  PLAN OF CARE END: 2024  FREQUENCY: 2 times per week  PRECAUTIONS: Renal failure, actively going through dialysis; type 1 diabetes; HTN; SAH; seizure (last one was 2019)  ON FLUID RESTRICTIONS--DO NOT OFFER WATER      Subjective: Pt reports no changes. Pt reports he thinks things are slowly but steadily improving.       Objective: See treatment diary below  ON FLUID RESTRICTIONS--DO NOT OFFER WATER      TE: Performed the following exercises to work on R UE strength and motor control required for IADL performance  -green flex bar pronation and supination 30x each to improve on UE strength and motor control.     TA:   -completed grooved peg board x25 to improve FMC/dexterity and 2pt pinch.   -completed bolt board x2 with increased time to improve FMC/dexterity. Pt requested to stop activity due to frustration and pain in hands.   -completed qbitz task x2; pt asked to utilize in hand manipulation to orient pieces correctly. Focused on attention, FMC, and dexterity. Pt reported he enjoyed activity and felt as though it was achieving what he wanted to work on.         Assessment: Pt tolerated session well today. Performed all exercises in seated due to wound on top of R foot. Continues to demonstrate impaired R hand FMC, limited endurance.  Pt reports increased fatigue after exercise especially in hand and R shoulder.  Pt would benefit from continued skilled occupational therapy services to improve fine motor coordination, dexterity, stregnth, UE function, and functional cognition.    Plan: Continue per plan of care.           SHORT TERM  GOALS ADDED 2/20/24:  Pt will increase sustained attention by completing trail making part A in 35 seconds to complete IADLs NOT MET  Pt will increase divided attention by completing trail making part B in 1 minute 30 seconds to complete IADLs NOT MET  Pt will increase delayed recall and recall 4 /5 items on MOCA to complete IADLs GOAL MET    LONG TERM GOALS ADDED 2/20/24:  Pt will increase sustained attention by completing trail making part A in 38 seconds to complete IADLs  Pt will increase divided attention by completing trail making part B in 1 minute 10 seconds to complete IADLs  Pt will increase delayed recall and recall 5/5 items on MOCA to complete IADLs  Resume right  hand dominance to refined functional assist with all activities of daily living

## 2024-08-20 NOTE — PROGRESS NOTES
Daily Note     Today's date: 2024  Patient name: Mateus Mckeon  : 1985  MRN: [id number]  Referring provider: Dania Sher DO  Dx:   Encounter Diagnosis     ICD-10-CM    1. Acute CVA (cerebrovascular accident) (HCC)  I63.9                     POC expires Auth Status Total   Visits  Start date  Expiration date PT/OT + Visit Limit? Co-Insurance   24     bomn                                                Subjective: Patient reports to PT session stating he is feeling a bit rough this morning. Notes he wore shoes that were too tight over the weekend and developed a wound on the top of his right foot. States that because of his neuropathy he did not feel it developing. He has been keeping it covered and washing it out with water. Has appointment with his podiatrist on Thursday.    Objective: See treatment diary below  *FLUID RESTRICTION- do not offer water*     BP start of session: NA  Per [employer]: >180/110 mmHg at rest, or >250/115 mmHg with activity, need to stop activity and re-assess after 5 minutes.      TA:  - Observation of (R) foot: blister-like wound on dorsal aspect approximately 4 cm x 2 cm in size; noticeable erythema of wound and immediate surrounding area, yellow fluid/exudate noted  - Removed patient's old bandage that was soiled with exudate  - Dressing of wound with sterile gauze pad  - Patient educated on regularly inspecting wound leading up to podiatry appt considering history of neuropathy; washing out with water and light soap    TE: (4# ankle weights)  *following performed on dynadisc*  - Seated LAQs  - Seated marches  - Seated hip adduction  *dynadisc removed due to overall fatigue*  - Seated hip abduction (green TB)    BP: 160/60 mmHg post-session (RUE, seated)      Assessment: Patient tolerated treatment session fairly today. Patient arrived reporting wound on dorsal aspect of (R) foot with observation revealing noticeable erythema and exudate.  Opted to defer standing activities based on wound's appearance as well as patient's history of neuropathy. Additionally deferred re-evaluation this date, with plan to perform next session pending clearance. Demonstrated overall increased fatigue during activity this date. Educated patient on performing regular skin checks leading up to podiatry appointment given history of neuropathy; he verbalized good understanding. Patient would benefit from continued PT to improve balance, endurance, and reduce fall risk.       Plan: Continue per plan of care.  Gait belt throughout session. Continue to incorporate more balance activities on foam surfaces             Outcome Measures Initial Eval  1/30/24 Re-eval  2/20/24 PN  4/2/2024 RE  5/2/2024 RE   6/17/24 RE  7/15/24   5xSTS 31.25 sec 52.20 sec no UE's    (1 LOB) 19.91 sec no UE 20.14 sec  No UE  15.84 sec  No UE 11.34 sec  No UE   TUG 16.3 sec with rollator    23.6 sec no AD 20.68 sec with rollator    18.44 sec no AD 23.41 sec with rollator    12.92 sec no AD 19.69 sec with rollator    12.45 sec no AD  15.79 secs with rollator    11.54 secs no AD 14.37 sec, w/ rollator      10.62 sec, no AD   10 meter  Next visit> 0.88 m/s 1.08 m/s Self selected:  .80 m/s  Fast: 1.29 m/s 1.16 self selected pace   MEDRANO  Next visit> 44/56 48/56 48/56 47/56   FGA 12/30 Next visit> 12/30 12/30 14/30 16/30   6MWT 900 ft 710 ft  Rollator  760 ft no  ft  No AD  885 ft no AD 1050 ft no AD   mCTSIB  FTEO firm  FTEC firm  FTEO foam  FTEC foam   Next visit> 30 sec  30 sec  5.5 sec  defer 30 sec  30 sec  30 sec  1 sec 30 sec  30 sec  9 secs  Unable to maintain 30 sec  30 sec  30 sec +  3 sec   ABC  65% 43.75% 56.88% 61.88% 58.75%

## 2024-08-21 ENCOUNTER — EVALUATION (OUTPATIENT)
Facility: CLINIC | Age: 39
End: 2024-08-21
Payer: MEDICARE

## 2024-08-21 DIAGNOSIS — I63.89 CEREBROVASCULAR ACCIDENT (CVA) DUE TO OTHER MECHANISM (HCC): Primary | ICD-10-CM

## 2024-08-21 PROCEDURE — 97530 THERAPEUTIC ACTIVITIES: CPT

## 2024-08-21 NOTE — PROGRESS NOTES
"OCCUPATIONAL THERAPY RE-EVALUATION    Today's Date: 2024  Patient Name: Mateus Mckeon  : 1985  MRN: [id number]  Referring Provider: Dania Sher,   Dx: No primary diagnosis found.      SKILLED ANALYSIS:  Pt presents to re-evaluation on 24 after ~5 months of OT s/p CVA.  Pt reports he noticed good overall improvement with use of the R UE, specifically with tieing his shoes.  Based on assessments, pt is demonstrating improved R grasp strength, R UE ROM, R UE strength, FMC and overall functional use of the R UE. Overall score on the FDT declined today, however pt dropped less pegs and only one compensation. Strength of R UE is now 5/5 in all planes. Pt continues to function below normal limits in the following domains: , endurance, FMC R>L, dexterity R>L, R grasp strength, higher level functional cognition.  Recommend continued participation in OP OT 2x/wk for an additional 4-8 weeks with continued focus on aforementioned deficits to maximize functional performance and QOL.  Discussed progress and recommendations with pt and he is in agreement.    Subjective:   : Pt states he feels like his endurance is not where it needs to be. He has been attempting to use his hand as much as he can, however does not see much progress - if any thing he states \"I feel like I'm losing ground\". Pt states he did start using the theraputty and continued using foam to work on hand strength. Pt states he is not able to keep up with her exercises on days that he has dialysis due to fatigue.     : Reports inc endurance during daily activities, but still fatigues quicker than baseline.  Able to complete zippers and buttons with inc time, requires assist for tying shoes.  Now able to write his initials and complete basic handwriting with ~80% legibility, significantly inc time and inc sizing    : Pt states he is progressing, he feels like the use of his R hand has come a long way. Pt states he is " "better able to use his R hand, and is now able to tie his shoes.     PLAN OF CARE START: 5/23/24  PLAN OF CARE END: 9/23/2024 (extended as of 7/11)  FREQUENCY: 2x/wk  PRECAUTIONS: Renal failure, actively going through dialysis; type 1 diabetes; HTN; SAH; seizure (last one was 2019)    Subjective    Mechanism of Injury  Bill is a 38-year-old male with history of end-stage renal disease on dialysis, hypertension, type 1 diabetes, and recent subarachnoid hemorrhage   Pt reports he went to the hospital and they found a brain bleed and reported he needed an angioplasty. Unfortunately, during the angioplasty he suffered a stroke that affected his R UE. Since his stroke he reports his R UE function has improved although he continues with R hand FMC/dexterity deficits.   Overall, pt requires assistance with 75% of his daily activities due to complex medical presentation as well as increased fatigue post-stroke.   \"There's nothing more like purgatory than using your non dominant side.\"    From Hospital note on 1/14/24:   38-year-old man with prior history of cerebellar and pontine strokes, prothrombin gene mutation, MTHFR gene mutation, diabetes, diabetic nephropathy with ESRD on HD, who presented with headache on 1/5 and was found to have subarachnoid hemorrhage in the basilar cisterns.  Unclear etiology.  Patient family reports nausea and retching around the time of headache.     - Underwent conventional angiogram later that day which was unremarkable for any clear source.  - MRI brain on 1/8 demonstrated multiple punctate foci of ischemia in the right MCA, bilateral cerebellar hemispheres, and right velia which may represent sequela of angiograph versus embolic strokes of unclear source (ESUS)   -Repeat angiogram on 1/12 was again unremarkable for source of subarachnoid.     Neurology consulted for right upper extremity weakness noted after second angiogram.  - Repeat MRI demonstrated numerous new embolic appearing foci of " "ischemia with similar suspected etiology as those mentioned above.     Transcranial Dopplers have been unremarkable with Lindegaard ratios consistently less than 3.     Spontaneous basilar cistern subarachnoid hemorrhage of yet unclear etiology.      Embolic appearing strokes, suspect complication of angiogram however cannot exclude additional embolic source.    Occupational Profile  Pt lives in a home with his parents; has one flight to bedroom and has AD throughout the house (grab bars etc.). Paulo reports he needs help with preparing meals (could get by making 1 meal for himself but not all day), laundry, and driving. He is independent with dressing and bathing, \"but it takes it out of me. I could do some things but I need help throughout the day.\"    It is of note that the pt is actively getting dialysis; has to sit still for 4 hours  Pt reports some numbness in fingers on left side (L sided forearm is where dialysis is put in) and notes difficulty with thinking and memory since stroke. No changes in vision     He enjoys spending time with nieces and nephews and would love to get a dog.    PATIENT GOAL: \"I want to be able to tie my shoes without having to ask my parents\"    HISTORY OF PRESENT ILLNESS:     PMH:   Past Medical History:   Diagnosis Date    Acute kidney injury (HCC)     Ambulates with cane     Anuria     Anxiety     Cellulitis of right elbow 03/31/2021    Chronic kidney disease     Depression     Diabetes mellitus (HCC)     Diarrhea     Emesis 10/24/2020    End stage renal disease (HCC) 02/11/2018    Formatting of this note might be different from the original. Last Assessment & Plan:  Secondary to DM.  On nightly PD.  Followed by [facility].  [facility] Formatting of this note might be different from the original. Last Assessment & Plan:  Formatting of this note might be different from the original. Lab Results  Component Value Date   EGFR     " 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    Esophagitis 07/21/2015    Falls     Gastroparesis     GERD (gastroesophageal reflux disease)     History of shingles 2010    History of transfusion 02/2018    no adverse reaction    Hyperlipidemia     Hyperphosphatemia     Hypertension     Hypoglycemia 07/15/2022    Itching     Mastoiditis of right side 07/15/2022    Muscle weakness     general unsteadiness    Obesity (BMI 30.0-34.9) 09/09/2019    Orthostatic hypotension 10/25/2020    Peripheral polyneuropathy 11/20/2019    PONV (postoperative nausea and vomiting) 01/26/2018    Protein-calorie malnutrition (HCC) 11/23/2020    Recurrent peritonitis (HCC) due to peritoneal dialysis catheter 07/31/2020    Retinopathy     Seizures (HCC)     early 2020 - one time    Skin abnormality     some dime size areas where skin was scratched from itching    Sleep apnea     Spontaneous bacterial peritonitis (HCC) 10/19/2020    Squamous cell skin cancer     left temple    Stroke (HCC)     x2 - off balance/no driving/fatigue    Swelling of both lower extremities     Traumatic onycholysis 07/21/2022    Vomiting     Wears glasses        Past Surgical Hx:   Past Surgical History:   Procedure Laterality Date    CARDIAC ELECTROPHYSIOLOGY PROCEDURE N/A 9/21/2023    Procedure: Cardiac loop recorder explant;  Surgeon: Parish Morgan MD;  [facility]: [facility];  Service: Cardiology    CARDIAC LOOP RECORDER  05/2018    COLONOSCOPY      EGD      EYE SURGERY Right     IR AV FISTULAGRAM/GRAFTOGRAM  02/23/2021    IR CEREBRAL ANGIOGRAPHY  1/12/2024    IR CEREBRAL ANGIOGRAPHY / INTERVENTION  1/5/2024    IR TUNNELED CENTRAL LINE PLACEMENT  02/16/2021    IR TUNNELED DIALYSIS CATHETER PLACEMENT  11/18/2020    IR TUNNELED DIALYSIS CATHETER REMOVAL  02/12/2021    IR TUNNELED DIALYSIS CATHETER REMOVAL  03/11/2021    MOHS SURGERY Left 12/14/2022    Left temple with Dr. Hassan    PERITONEAL CATHETER INSERTION N/A 08/27/2018    Procedure: UNROOF PD CATHETER;   Surgeon: Felipe Lindo DO;  Location: [facility];  Service: General    IL ARTERIOVENOUS ANASTOMOSIS OPEN DIRECT Left 11/09/2020    Procedure: CREATION FISTULA  ARTERIOVENOUS (AV) - LEFT WRIST;  Surgeon: Placido Altamirano MD;  Location: [facility];  Service: Vascular    IL ESOPHAGOGASTRODUODENOSCOPY TRANSORAL DIAGNOSTIC N/A 04/18/2019    Procedure: ESOPHAGOGASTRODUODENOSCOPY (EGD);  Surgeon: Ale Figueroa MD;  Location: [facility];  Service: Gastroenterology    IL LAPS INSERTION TUNNELED INTRAPERITONEAL CATHETER N/A 08/06/2018    Procedure: LAPAROSCOPIC PD CATHETER PLACEMENT;  Surgeon: Felipe Lindo DO;  Location[facility];  Service: General    IL REMOVAL TUNNELED INTRAPERITONEAL CATHETER N/A 11/18/2020    Procedure: REMOVAL CATHETER PERITONEAL DIALYSIS;  Surgeon: Abdifatah Ty MD;  [facility];  Service: General    TONSILLECTOMY      UPPER GASTROINTESTINAL ENDOSCOPY          Pain: FLACC 0     Objective    Upper Extremities:  Pt is right hand dominant    Range of Motion:  AROM:   R UE   - Shoulder flexion: 160* - WFL  -Shoulder abduction: 180* - WNL  - Shoulder extension: WNL  - Elbow flexion WNL  - Elbow extension: WNL  - Wrist flexion: WNL  - Wrist extension: WNL  - Pronation: WNL  - Supination: 95% AROM  - Finger extension: WNL  - Finger flexion: WNL    L UE : 100%     Manual Muscle Testing:  R UE:  - Shoulder flexors: 5/5  - Shoulder extensors: 5/5  - Shoulder abductors: 5/5   - Shoulder external rotators: 5/5   - Shoulder internal rotators: 5/5  - Elbow flexors: 5/5  - Elbow extensors: 5/5  - Wrist flexors: 5/5  - Wrist extensors: 5/5  L UE:  - Shoulder flexors: 5/5  - Shoulder extensors: 5/5  - Shoulder abductors: 5/5   - Shoulder external rotators: 5/5   - Shoulder internal rotators: 5/5  - Elbow flexors: 5/5  - Elbow extensors: 5/5  - Wrist flexors: 5/5  - Wrist extensors: 5/5                  ANGEL: RUE: 40lb (34lb) LUE: 100lb  The age norm is approximately R: 116.8;bs and L: 112.8 lbs, indicating  decreased  strength..                 2 point pinch: RUE: 6lb (previously 4lb), LUE: 10.5lb (previously 12lb) (age norms are 18 lbs) DID NOT PERFORM  3 point pinch: RUE: 10.5lb (previously 7lb), LUE: 20lb (previously 10lb) (age norms are 24 lbs) DID NOT PERFORM  Lateral pinch: RUE: 14lb (previously 8), LUE: 21lb (previously 21lb) (age norms are 25 lbs) DID NOT PERFORM                  NINE-HOLE PEG TEST: assesses dexterity/fine motor coordination   R hand: 44 seconds (Prior: 1:04 with 2 pegs dropped seconds (previously 1 min 16 second)  L hand: 47 seconds (previously: 38.84 seconds)  Pt demonstrates decreased FMC compared to norms for age/sex (L: 18.62 seconds and R: 17.71 seconds)     Functional Dexterity Test: assesses patient's ability to use the hand for daily tasks requiring a 3-jaw federico prehension between the fingers and the thumb. Completed in a zig-zag pattern with unaffected UE first  5-second penalty (each):  -Supinating forearm to assist  -Touching the board for assist    10-second penalty:  -Dropping a peg    R UE: 3:49 with no compensatory movements and 1 drop = 239 seconds (Prior: 1:59 with 4 compensations and 6 pegs dropped = 199 seconds (previously 2 minutes and 53 second +6 supinations, 3 board touches, and 3 drops (adding 75 seconds) = 248 seconds)   L UE:  44.4 seconds (previously: 58.39 seconds)     Norms based on age/sex: (Lucy et. al., 2013)    Age Group Sex Non-Dominant (50th percentile) Dominant (50th percentile)   20-49 Male 23.2 24.1     Pt performance on Functional Dexterity Test implies non functional hand compared to norms for age/sex.    Score by Functional Level Range (dominant hand) Range (non-dominant hand)   Functional  16-25 seconds 18-27 seconds   Moderately Functional  26-33 seconds 28-45 seconds   Minimally Functional 34-50 seconds 46-55 seconds   Nonfunctional >50 seconds >55 seconds     Subluxation: NONE    Scapular winging: MINIMAL    FUGYL JOHNS ASSESSMENT OF MOTOR  RECOVERY AFTER STROKE: NOT ASSESSED   R UE:  UPPER EXTREMITY SEATED: 36   WRIST: 9/10   HAND:    COORDINATION/SPEED:    OVERALL SCORE: 63/66   (Clinical change= 5 points, severe impairment <19, and mild impairment is >50)         Functional Cognition:  Highest level of education: some college     Song Cognitive Assessment Version 8.3 (MoCA V8.3)  Visuospatial/executive functionin/5  Naming:  3/3  Memory: 1st trial:  , 2nd trial:    Attention/concentration:   List of letters:   Serial Seven Subtraction:  3/3 w/ 0 errors  Language/sentence repetition:   Language Fluency: 0 (previously: 0  Abstract/Correlational Thinkin/2   Delayed Recall:   Orientation:                Memory Index Score: 13/15 (previously: 11/15)  MoCA V1 8.3 Raw Score: 2730 (previously: ), MIS:  13/15, indicative of no neurocognitive impairments.     MoCA Scoring              Normal: 26+              Mild Cognitive Impairment: 18-25               Moderate Cognitive Impairment: 10-17              Severe Cognitive Impairment: <10     Trail making Part A and Part B:  Part A: 54 seconds I'ly (previously 1 minute and 2 seconds independently)  Part B: 1:28 I'ly (previously 1 minute and 53.92 seconds independently)  Indicating deficits: Part A > 24.40 seconds and Part B > 50.68 seconds       Contextual Memory Test:   Immediate:  (previously 10/20, initially 3/20)  Delayed:   (previously 10/20, initially 3/20)    Following re-evaluation completed 2x qbitz puzzles to work on dexterity,  skills.     GOALS:   Short Term Goals:  Pt will increase R UE  strength to 18 lbs to complete IADLs ACHIEVED   Pt will increase R FMC by being able to don 5 pegs in to 9 hole peg board in 1 minute and 40 seconds on 9 hole peg to complete ADLs and IADLs GOAL MET   Pt will increase  R UE strength to complete  of hand section of fugyl benavides for tabletop tasks ACHIEVED 24  Pt will increase  R UE  strength to complete 4/6 of coordination section of fugyl benavides for tabletop tasks ACHIEVED 5/23/24        Long Term Goals: 8-12 weeks  Pt will increase R UE  strength to 25 lbs to complete IADLs ACHIEVED 5/23/24  Pt will increase R FMC by being able to don all 9 pegs in to 9 hole peg board in 3 minutes on 9 hole peg to complete ADLs and IADLs GOAL MET   Pt will increase  R UE strength to complete 10/14 of hand section of fugyl benavides for tabletop tasks ACHIEVED 5/23/24  Pt will increase  R UE strength to complete 5/6 of coordination section of fugyl benavides for tabletop tasks PROGRESSING    New Goals as 5/23/24  Pt will demonstrate improved  strength as evidenced by increased ANGEL dynamometer to 40 lbs for improved performance in ADLs/IADLs ACHIEVED   Pt will demonstrate improved lateral pinch strength as evidenced by increase to 6 lbs for improved performance in ADLs/IADLs ACHIEVED  Pt will demonstrate improved three point pinch strength as evidenced by increase to 9 lbs for improved performance in ADLs/IADLs ACHIEVED  Pt will demonstrate improved tip pinch strength as evidenced by increase to 10 lbs for improved performance in ADLs/IADLs PROGRESSING  Pt will demonstrate improved FMC required for ADLs/IADLs as demonstrated by completing 9-hole peg test within 1 minute and 5 seconds Achieved 8/21  Pt will demonstrate improved dexterity required for ADLs/IADLs as demonstrated by completing FDT within 200 seconds, including penalties. ACHIEVED    UPDATED GOALS AS OF 7/11:  Pt will demonstrate improved  strength as evidenced by increased ANGEL dynamometer to 52 lbs for improved performance in ADLs/IADLs Progressing    Pt will demonstrate improved dexterity required for ADLs/IADLs as demonstrated by completing FDT within 180 seconds, including penalties. Progressing    OTHER PLANNED THERAPY INTERVENTIONS:   Supine, seated, and in stance neuro re-ed  Tricep AG  NMES/FES  FMC/prehension  Timed Trials  Manual  tx  Hand to target  Sensory re-ed  Seated functional reach: crossing midline  Supine place and hold  WBearing strategies   Closed chain activities  Open chain activities  Internal and external memory aides        Objective Measurement Tracker:    IE (2/15/24) 1st Re-eval:   (3/19/24) 2nd Re-eval:  (4/22/24) 3rd Re-eval:     MoCA 24/30 NOT TODAY/30 23/30 Not reassessed /30   TM Test A   To be completed next session Not reassessed    TM Test B   To be completed next session Not reassessed    CMT Immediate: 3/20, 0 confabulations  Delayed: 3/20, 0 confabulations NOT TODAY To be completed next session Not reassessed    Nine Hole Peg Test R hand: 3 pegs in 1 minute and 48.49 seconds   L hand: 35.14 seconds   R hand: all 9 pegs in and out in 1 minute and 36 seconds    L hand: 39.59 seconds  R hand: all 9 pegs in and out in 1 min. 31 secs. With 2 drops. L hand: 38.84 seconds. 1 min 16 seconds    Functional Dexterity Test NOT TODAY R UE: 2 minutes and 6 seconds to complete 8 with compensatory strategies including the board and 1 peg drop  L UE: 45 seconds  R UE: 3 mins. 50 secs. To complete with 7 drops/  compensations.   L UE: 58.39 secs. 258 seconds     Strength R: 15lb,   L: 100lb R: 15lbs  L: 100lbs  R: 15 lbs  L: 100 lbs 35 lbs    Lateral Pinch Strength NOT TODAY  Not today   8 lbs    2 Point Pinch Strength NOT TODAY   Not today   4 lbs     3 Point Pinch Strength NOT TODAY   Not today  7 lbs     Manual Muscle Testing             Fugl Harrison UE: 36/36  Wrist: 10/10  Hand: 6/14  Coordination: 2/6 NOT TODAY  UE: 28/36  Wrist: 7/10  Hand: 10/14  Coordination:  3/6 UE: 36  Wrist: 7/10  Hand: 12/14  Coordination:   4/6  Overall 59/66

## 2024-08-22 ENCOUNTER — OFFICE VISIT (OUTPATIENT)
Dept: PODIATRY | Facility: CLINIC | Age: 39
End: 2024-08-22
Payer: MEDICARE

## 2024-08-22 VITALS — RESPIRATION RATE: 18 BRPM | BODY MASS INDEX: 26.74 KG/M2 | HEIGHT: 71 IN | WEIGHT: 191 LBS

## 2024-08-22 DIAGNOSIS — E10.42 DIABETIC POLYNEUROPATHY ASSOCIATED WITH TYPE 1 DIABETES MELLITUS (HCC): Primary | ICD-10-CM

## 2024-08-22 DIAGNOSIS — B35.1 ONYCHOMYCOSIS: ICD-10-CM

## 2024-08-22 DIAGNOSIS — S90.811A ABRASION OF RIGHT FOOT, INITIAL ENCOUNTER: ICD-10-CM

## 2024-08-22 PROCEDURE — 99213 OFFICE O/P EST LOW 20 MIN: CPT | Performed by: PODIATRIST

## 2024-08-22 PROCEDURE — 11719 TRIM NAIL(S) ANY NUMBER: CPT | Performed by: PODIATRIST

## 2024-08-22 RX ORDER — MUPIROCIN 20 MG/G
OINTMENT TOPICAL 2 TIMES DAILY
Qty: 22 G | Refills: 0 | Status: SHIPPED | OUTPATIENT
Start: 2024-08-22

## 2024-08-22 NOTE — PROGRESS NOTES
Established patient with class findings presents for nail care.  Patient is currently on dialysis and has known neuropathy.  There is an abrasion present on the dorsal lateral aspect of the right foot.  This was first noted by the patient approximately 6 days ago.  He is using Neosporin at the site.  Vascular exam:  DP  0/4 bilateral; PT  0 4 bilateral   Dermatological exam:  Each toenail is thick and  dystrophic.  There is an abrasion on the dorsal lateral aspect of the right foot.  There is slight discharge noted on his bandage.  No evidence of cellulitis or acute infection.  Diagnosis:  Diabetes mellitus; abrasion right foot  Treatment: Trimmed multiple dystrophic toenails.  Prescribed Bactroban ointment for twice daily application to right foot abrasion.  Patient will contact me should symptoms worsen.    Nail removal    Date/Time: 8/22/2024 7:30 AM    Performed by: Miguel Ford DPM  Authorized by: Miguel Ford DPM    Nails trimmed:     Number of nails trimmed:  10

## 2024-08-26 ENCOUNTER — OFFICE VISIT (OUTPATIENT)
Facility: CLINIC | Age: 39
End: 2024-08-26
Payer: MEDICARE

## 2024-08-26 DIAGNOSIS — I63.89 CEREBROVASCULAR ACCIDENT (CVA) DUE TO OTHER MECHANISM (HCC): Primary | ICD-10-CM

## 2024-08-26 PROCEDURE — 97112 NEUROMUSCULAR REEDUCATION: CPT

## 2024-08-26 NOTE — PROGRESS NOTES
OT Daily Note     Today's date: 2024  Patient name: Mateus Mckeon  : 1985  MRN: [id number]  Referring provider: Dania Sher DO  Dx:   Encounter Diagnosis     ICD-10-CM    1. Cerebrovascular accident (CVA) due to other mechanism (HCC)  I63.89           Start Time: 0800  Stop Time: 0845  Total time in clinic (min): 45 minutes    PLAN OF CARE START: 24  PLAN OF CARE END: 2024  FREQUENCY: 2 times per week  PRECAUTIONS: Renal failure, actively going through dialysis; type 1 diabetes; HTN; SAH; seizure (last one was 2019)  ON FLUID RESTRICTIONS--DO NOT OFFER WATER      Subjective: Pt reports he hasn't been walking       Objective: See treatment diary below  ON FLUID RESTRICTIONS--DO NOT OFFER WATER      NMR:  Performed FMC TM web using red pincher and 2lb wrist weight for proprioceptive feedback focusing on FMC, divided attention and memory stating foods required 2 cues overall   Completed FMC and dexterity task of FMC flower connects task using 2 lb wrist weight donned on RUE for proprioceptive feedback focusing on FMC, bimanual coordination.      Assessment: Pt tolerated session well today. Pt required a few cues overall for problem solving for memory portion of task  Pt would benefit from continued skilled occupational therapy services to improve fine motor coordination, dexterity, stregnth, UE function, and functional cognition.    Plan: Continue per plan of care.           SHORT TERM GOALS ADDED 24:  Pt will increase sustained attention by completing trail making part A in 35 seconds to complete IADLs NOT MET  Pt will increase divided attention by completing trail making part B in 1 minute 30 seconds to complete IADLs NOT MET  Pt will increase delayed recall and recall 4 /5 items on MOCA to complete IADLs GOAL MET    LONG TERM GOALS ADDED 24:  Pt will increase sustained attention by completing trail making part A in 38 seconds to complete IADLs  Pt will increase divided  attention by completing trail making part B in 1 minute 10 seconds to complete IADLs  Pt will increase delayed recall and recall 5/5 items on MOCA to complete IADLs  Resume right  hand dominance to refined functional assist with all activities of daily living

## 2024-08-28 ENCOUNTER — OFFICE VISIT (OUTPATIENT)
Facility: CLINIC | Age: 39
End: 2024-08-28
Payer: MEDICARE

## 2024-08-28 DIAGNOSIS — I63.89 CEREBROVASCULAR ACCIDENT (CVA) DUE TO OTHER MECHANISM (HCC): Primary | ICD-10-CM

## 2024-08-28 PROCEDURE — 97112 NEUROMUSCULAR REEDUCATION: CPT

## 2024-08-28 NOTE — PROGRESS NOTES
"OT Daily Note     Today's date: 2024  Patient name: Mateus Mckeon  : 1985  MRN: [id number]  Referring provider: Dania Sher DO  Dx:   Encounter Diagnosis     ICD-10-CM    1. Cerebrovascular accident (CVA) due to other mechanism (HCC)  I63.89           Start Time: 0800  Stop Time: 0845  Total time in clinic (min): 45 minutes    PLAN OF CARE START: 24  PLAN OF CARE END: 2024 (extended as of )  FREQUENCY: 2x/wk  PRECAUTIONS: Renal failure, actively going through dialysis; type 1 diabetes; HTN; SAH; seizure (last one was 2019)  ON FLUID RESTRICTIONS--DO NOT OFFER WATER    Subjective: Nothing new reported today.     Objective: See treatment diary below    NMR:  -Completed functional reaching task with pt picking up 1\" blocks using gripper on 4th level and 2 lb wrist weight donned while reaching to place into container. L UE endurance, proximal stability with reaching,  strength.   -Completed FMC activity with perler beads using tweezers initially to place them onto board, however then downgraded to using fingers due to difficulty with tweezers. FMC, graded control, dexterity.     Assessment: Pt tolerated session well today. Pt fatigued quickly with reaching exercises today and difficulty with FMC, partially due to limited sensation from neuropathy. Pt would benefit from continued skilled occupational therapy services to improve fine motor coordination, dexterity, stregnth, UE function, and functional cognition.    Plan: Continue per plan of care.       SHORT TERM GOALS ADDED 24:  Pt will increase sustained attention by completing trail making part A in 35 seconds to complete IADLs NOT MET  Pt will increase divided attention by completing trail making part B in 1 minute 30 seconds to complete IADLs NOT MET  Pt will increase delayed recall and recall 4 /5 items on MOCA to complete IADLs GOAL MET    LONG TERM GOALS ADDED 24:  Pt will increase sustained attention by " completing trail making part A in 38 seconds to complete IADLs  Pt will increase divided attention by completing trail making part B in 1 minute 10 seconds to complete IADLs  Pt will increase delayed recall and recall 5/5 items on MOCA to complete IADLs  Resume right  hand dominance to refined functional assist with all activities of daily living

## 2024-08-30 NOTE — ASSESSMENT & PLAN NOTE
----- Message from Yobani Burton MD sent at 8/30/2024 11:29 AM CDT -----  Call patient.  Kidney and liver function ok.  No anemia.  UA normal.  Thyroid normal.    Patient does follow up with PCP

## 2024-09-04 ENCOUNTER — OFFICE VISIT (OUTPATIENT)
Facility: CLINIC | Age: 39
End: 2024-09-04
Payer: MEDICARE

## 2024-09-04 ENCOUNTER — EVALUATION (OUTPATIENT)
Facility: CLINIC | Age: 39
End: 2024-09-04
Payer: MEDICARE

## 2024-09-04 DIAGNOSIS — I63.89 CEREBROVASCULAR ACCIDENT (CVA) DUE TO OTHER MECHANISM (HCC): Primary | ICD-10-CM

## 2024-09-04 DIAGNOSIS — I63.9 ACUTE CVA (CEREBROVASCULAR ACCIDENT) (HCC): Primary | ICD-10-CM

## 2024-09-04 PROCEDURE — 97112 NEUROMUSCULAR REEDUCATION: CPT

## 2024-09-04 PROCEDURE — 97530 THERAPEUTIC ACTIVITIES: CPT

## 2024-09-04 PROCEDURE — 97110 THERAPEUTIC EXERCISES: CPT

## 2024-09-04 NOTE — PROGRESS NOTES
PT Re-Evaluation /PROGRESS NOTE         POC expires Auth Status Total   Visits  Start date  Expiration date PT/OT + Visit Limit? Co-Insurance   5/14/24         8/6/24         10/7/24                                        Today's date: 2024  Patient name: Mateus Mckeon  : 1985  MRN: [id number]  Referring provider: Dania Sher DO  Dx:   Encounter Diagnosis     ICD-10-CM    1. Acute CVA (cerebrovascular accident) (HCC)  I63.9                 Assessment  Assessment details: Patient is a 39 y.o. Male who has been presenting to skilled outpatient PT s/p CVA. He has PMH of ESRD on dialysis and has also had 2 prior CVA's. Patient demonstrated overall improvements in his Callejas score since last reassessment, likely indicating overall gains in static balance. Patient otherwise demonstrates general plateau in 5 x STS, TUG, FGA, 6 MWT, and 10 MWT scores. Any regressions noted with testing this date do not meet MCID values and are thus considered non-significant. Lack of progress this testing date may be attributed to patient's approximate 2 week hiatus from PT services. Per cutoff scores for the FGA he is classified as HIGH risk for falls. All cutoff scores and normative values taken from the [employer]TA Neuro Section and Rehab Measures. Discussed with patient the therapeutic benefits of taking a break from PT services. Should patient continue to demonstrate general plateau in progress in one month, plan to establish comprehensive HEP and d/c, with patient in verbal agreement. Plan to continue with HIGT and HIIT with incorporation of balance activities on uneven and complaint surfaces to reduce overall risk for falls, as well as gait training with LRAD. Patient will continue to benefit from skilled PT to achieve goals below and maximize function post CVA.       Impairments: Abnormal coordination, Abnormal gait, Abnormal muscle tone, Abnormal or  restricted ROM, Activity intolerance, Impaired balance, Impaired physical strength, Lacks appropriate HEP, Poor posture, Poor body mechanics, Pain with function, Safety issue, Weight-bearing intolerance, Abnormal movement, Difficulty understanding, Abnormal muscle firing  Understanding of Dx/Px/POC: Good  Prognosis: Good      Patient verbalized understanding of POC.         Please contact me if you have any questions or recommendations. Thank you for the referral and the opportunity to share in Mateus Mckeon's care.        Plan  Plan details: PT 2-3x/week   Patient would benefit from: Skilled PT  Planned modality interventions: Biofeedback, Cryotherapy, TENS, Thermotherapy  Planned therapy interventions: Abdominal trunk stabilization, ADL training, Balance, Balance/WB training, Breathing training, Body mechanics training, Coordination, Functional ROM exercises, Gait training, HEP, Joint Mobilization, Manual Therapy, Ma taping, Motor coordination training, Neuromuscular re-education, Patient education, Postural training, Strengthening, Stretching, Therapeutic activities, Therapeutic exercises, Therapeutic training, Transfer training, Activity modification, Work reintegration  Frequency: 2-3x/wk  Duration in weeks: 8 weeks  Plan of Care beginning date: 9/4/2024  Plan of Care expiration date: 12 weeks - 10/30/2024  Treatment plan discussed with: Patient         Goals  Short Term Goals (4 weeks):    Patient will improve 6MWT by 100ft w/LRAD demonstrating significant improvements in endurance  w/in 4 weeks - MET  Patient will be independent with HEP within 4 weeks - NOT MET  Patient will be able to ambulate household distances (100ft) or greater with LRAD  within 4 weeks - MET (doesn't use rollator in the house)  Patient will demonstrated improved LE strength and endurance by improved 5xSTS to 30 sec or less within 4 weeks - MET  Patient will perform Callejas to further assess static balance and fall risk. -  MET  Patient will perform 10 meter walk test to further assess gait speed and fall risk. - MET  Patient will improve ABC to 80% to demonstrate increased balance confidence and reduced fall risk. - NOT MET      Long Term Goals (12 weeks):  - Patient will be independent in a comprehensive home exercise program- NOT MET  - Patient will improve gait speed to 1.0 m/s to improve safety with community ambulation - MET  - Patient will improve MEDRANO by 6 points to facilitate return to safe independent ambulation- NOT MET  - Patient will improve scoring on FGA by 4 points to progress safety with dynamic tasks- NOT MET  - Patient will improve 6 Minute Walk Test score by 190 feet to promote improved cardiovascular endurance- MET   - Patient will report going on walks at least 3 days per week to promote independence and improved cardiovascular endurance- NOT MET  - Patient will be able to ascend/descend stairs reciprocally with 1 UE assist to promote independence and safety with ADLs- NOT MET  - Patient will demonstrated improved LE strength and endurance by improved 5xSTS to 13 sec or less - MET        Cut off score    All date taken from [employer]TA Neuro Section or Rehab Measures      [place]:  Siva et al., 2018  MDC: 2.7 pts    She rodríguez al., 2011  Cut-off score: 45/56    Chronic CVA  < 44/56 high risk for falls (Siva et al., 2018)  < 47.5/56 slow walker status (Jus et al., 2011) 5xSTS: Jean Carlos 2010  MDC: 2.3 sec  Age Norms:  60-69: 11.1 sec  70-79: 12.6 sec  80-89: 14.8 sec    Yamileth 2010, Chronic Stroke  Chronic CVA: 12 sec   TUG  Nubia rodríguez al., 2005  MDC: 2.9 sec    Cut off score:  >13.5 sec community dwelling adults  >32.2 frail elderly  <20 I for basic transfers  >30 dependent on transfers 10 Meter Walk Test:   Talia et al., 2006  Small meaningful change: 0.06 m/s  Substantial meaningful change: 0.14 m/s  MCID: 0.16 m/s    < 0.4 m/s household ambulators  0.4 - 0.8 m/s limited community ambulators  > 0.8 m/s  "community ambulators   FGA: Deepika et al., 2010  MCID: 4.2 pts  Geriatrics/community < 22/30 fall risk  Geriatrics/community < 20/30 unexplained falls DGI  MDC: vestibular - 4 pts  MDC: geriatric/community - 3 pts  Falls risk <19/24   6 Minute Walk Test  Talia et al., 2006  MDC: 60.98 m (200.01 ft)    Verónica Norwood, & Ally, 2012  MCID: 34.4 m    Age Norms  60-69: M - 1876 ft   F - 1765 ft  70-79: M - 1729 ft   F - 1545 ft  80-89: M - 1368 ft   F - 1286 ft Modified Joel  0: No increase in tone  1: Catch and release or min resistance at end range  1+: Catch f/b min resistance throughout remainder (< half ROM)  2: Easily moved, but more marked tone throughout most ROM  3: Significant tone, PROM difficult  4: Rigid   MiniBest: Glenda et al., 2013  CVA < 17.5 fall risk Pass (Acute CVA)  MDC: 1.8 points (acute), 3.2 points (chronic)         Subjective    History of Present Illness  Mechanism of injury: Pt had CVA end of January (3rd stroke). He also has ESRD and goes to dialysis 3 days/week. He mostly uses rollator but at home he furniture walks. His parents need to assist him more now than prior to stroke. He likes to spend time with nieces and nephews and attend their sporting events.     Updated (4/2/2024): Patient reports overall satisfaction with PT services thus far, feels his stamina has gotten better. He wants to continue to work on this so that he has \"the energy to do things.\"    Update (5/2/2024): Pt reports he feels like his stamina is actually getting worse. He has not been as diligent as should be with using his treadmill at home. Overall feeling more tired, not much energy. Denies any falls, but still experiences occasional loss of balance.     Update (6/17/24): Pt reports he feels his stamina is still decreased, but he feels he is slowly improving with balance. He reports no new falls at home since last re-eval.    Updated 7/17/24: Patient states he feels therapy is going well; he states his main goal is to " improve his stamina which he states has noticed is progressing although he would like to continue to fortify this.     Updated (9/4/2024): Patient reports he feels his walking and particularly his endurance has been getting better. He wants to continue to work on his endurance as this is his primary goal. He feels he is starting to reach a plateau with his balance and expects to reach a ceiling in his improvement in this area. He reports he had a fall yesterday when he went to rise from a chair, noting he lost his balance. He fell on his butt, but otherwise denies any significant abrasions or injury. Additionally followed up with podiatrist regarding wound on dorsal aspect of (R) foot and was given a cream. Per podiatry note, no signs of infection or cellulitis.    Primary AD: rollator (PLOF he mostly used SPC and only used rollator for long distances like going to nephew's soccer game)   Assist level at home: lives with parents who are assisting with ADL's and IADL's, but he is transferring and walking independently.   WC usage: none  PLOF: using SPC mostly, still required some assist from parents for ADL's and IADL's   Patient goals: to walk with SPC again, work on balance, build up walking endurance     Pain  Pain in both feet due to neuropathy     Social Support  Steps to enter house: 2 NIRU  Stairs in house: full flight to 2nd floor (able to perform independently)    Lives in: 2 story home   Lives with: parents    Employment status: disabled   Hand dominance: right    Treatments  Previous treatment: PT [address]  Current treatment: PT, OT  Diagnostic Testing:       Objective     Vitals  - HR: 79 bpm  - BP: 140/70 mmHg   - SPO2: 97%    LE MMT  - R Hip Flexion: 5/5  - L Hip Flexion: 5/5  - R Hip Extension: 5/5  - L Hip Extension: 5/5  - R Hip Abduction: 5/5  - L Hip abduction: 5/5  - R Hip adduction: 5/5  - L Hip adduction: 5/5  - R Knee Extension: 5/5 - L Knee Extension: 5/5  - R Knee Flexion: 5/5  - L Knee  Flexion: 5/5  - R Ankle DF: 5/5  - L Ankle DF: 5/5  - R Ankle PF: 5/5  - L Ankle PF: 5/5    Sensation  - Light touch: neuropathy both feet up to mid calf   - Temperature: diminished     Coordination  - Alternating Toe Taps: impaired  - Heel to shin: impaired     Clonus  - L: Yes  - R: Yes  - Fatiguing: Yes  - Number of beats: 1-2 beats    Vision  - Glasses: Yes  - Abnormalities prior to CVA: Yes, retinopathy (gets shots every 3 months)  - Abnormalities post CVA: No,     Neglect  - R sided: No  - L sided: No    Gait  Wide COLBY, ataxic, decreased step length    Vitals:  HR: 75 bpm  SpO2: 99%  BP: 159/90 mmHg      Outcome Measures Initial Eval  1/30/24 Re-eval  2/20/24 PN  4/2/2024 RE  5/2/2024 RE   6/17/24 RE  7/15/24 PN   9/4/2024   5xSTS 31.25 sec 52.20 sec no UE's    (1 LOB) 19.91 sec no UE 20.14 sec  No UE  15.84 sec  No UE 11.34 sec  No UE 13.67 sec   no UE   TUG 16.3 sec with rollator    23.6 sec no AD 20.68 sec with rollator    18.44 sec no AD 23.41 sec with rollator    12.92 sec no AD 19.69 sec with rollator    12.45 sec no AD  15.79 secs with rollator    11.54 secs no AD 14.37 sec, w/ rollator      10.62 sec, no AD 11.48 sec   no AD   10 meter  Next visit> 0.88 m/s 1.08 m/s Self selected:  .80 m/s  Fast: 1.29 m/s 1.16 self selected pace 1.11 m/s self selected pace   MEDRANO  Next visit> 44/56 48/56 48/56 47/56 49/56   FGA 12/30 Next visit> 12/30 12/30 14/30 16/30 15/30   6MWT 900 ft 710 ft  Rollator  760 ft no  ft  No AD  885 ft no AD 1050 ft no  ft no AD   mCTSIB  FTEO firm  FTEC firm  FTEO foam  FTEC foam   Next visit> 30 sec  30 sec  5.5 sec  defer 30 sec  30 sec  30 sec  1 sec 30 sec  30 sec  9 secs  Unable to maintain 30 sec  30 sec  30 sec +  3 sec 30 sec  30 sec  30 sec  Unable   ABC  65% 43.75% 56.88% 61.88% 58.75% 66.88%        Precautions: Check BP's RUE only (fistula LUE)   Past Medical History:   Diagnosis Date    Acute kidney injury (HCC)     Ambulates with cane     Anuria     Anxiety      Cellulitis of right elbow 03/31/2021    Chronic kidney disease     Depression     Diabetes mellitus (HCC)     Diarrhea     Emesis 10/24/2020    End stage renal disease (HCC) 02/11/2018    Formatting of this note might be different from the original. Last Assessment & Plan:  Secondary to DM.  On nightly PD.  Followed by [facility].  Patient considering transplant for kidney and pancreas through [facility] Formatting of this note might be different from the original. Last Assessment & Plan:  Formatting of this note might be different from the original. 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    Esophagitis 07/21/2015    Falls     Gastroparesis     GERD (gastroesophageal reflux disease)     History of shingles 2010    History of transfusion 02/2018    no adverse reaction    Hyperlipidemia     Hyperphosphatemia     Hypertension     Hypoglycemia 07/15/2022    Itching     Mastoiditis of right side 07/15/2022    Muscle weakness     general unsteadiness    Obesity (BMI 30.0-34.9) 09/09/2019    Orthostatic hypotension 10/25/2020    Peripheral polyneuropathy 11/20/2019    PONV (postoperative nausea and vomiting) 01/26/2018    Protein-calorie malnutrition (HCC) 11/23/2020    Recurrent peritonitis (HCC) due to peritoneal dialysis catheter 07/31/2020    Retinopathy     Seizures (HCC)     early 2020 - one time    Skin abnormality     some dime size areas where skin was scratched from itching    Sleep apnea     Spontaneous bacterial peritonitis (HCC) 10/19/2020    Squamous cell skin cancer     left temple    Stroke (HCC)     x2 - off balance/no driving/fatigue    Swelling of both lower extremities     Traumatic onycholysis 07/21/2022    Vomiting     Wears glasses

## 2024-09-04 NOTE — PROGRESS NOTES
OT Daily Note     Today's date: 2024  Patient name: Mateus Mckeon  : 1985  MRN: [id number]  Referring provider: Dania Sher DO  Dx:   Encounter Diagnosis     ICD-10-CM    1. Cerebrovascular accident (CVA) due to other mechanism (HCC)  I63.89           Start Time: 0800  Stop Time: 0845  Total time in clinic (min): 45 minutes    PLAN OF CARE START: 24  PLAN OF CARE END: 2024 (extended as of )  FREQUENCY: 2x/wk  PRECAUTIONS: Renal failure, actively going through dialysis; type 1 diabetes; HTN; SAH; seizure (last one was 2019)  ON FLUID RESTRICTIONS--DO NOT OFFER WATER    Subjective: Pt states he fell yesterday when he got up too quickly without thinking when he was trying to help his nephew open a door. Pt states he fell backward onto the floor in a seated position. Pt states he is sore, but has no injuries.     Objective: See treatment diary below    TE:   Performed 2x10 sets of bilateral external rotation with green Theraband for shoulder endurance/stability  Performed 2x10 sets of bilateral rows with blue Theraband for shoulder endurance/stability    NMR:  Performed lacing and tieing activity to work on bilateral coordination, FMC, dexterity  Performed beading activity to work on L hand FMC, dexterity. Had pt stating names of colleges for each letter while going thru the alphabet to work on dual tasking. Pt required occasional cues to come up with a word.     Assessment: Pt tolerated session well today.  Pt would benefit from continued skilled occupational therapy services to improve fine motor coordination, dexterity, stregnth, UE function, and functional cognition.    Plan: Continue per plan of care.       SHORT TERM GOALS ADDED 24:  Pt will increase sustained attention by completing trail making part A in 35 seconds to complete IADLs NOT MET  Pt will increase divided attention by completing trail making part B in 1 minute 30 seconds to complete IADLs NOT MET  Pt will  increase delayed recall and recall 4 /5 items on MOCA to complete IADLs GOAL MET    LONG TERM GOALS ADDED 2/20/24:  Pt will increase sustained attention by completing trail making part A in 38 seconds to complete IADLs  Pt will increase divided attention by completing trail making part B in 1 minute 10 seconds to complete IADLs  Pt will increase delayed recall and recall 5/5 items on MOCA to complete IADLs  Resume right  hand dominance to refined functional assist with all activities of daily living

## 2024-09-06 ENCOUNTER — TELEPHONE (OUTPATIENT)
Age: 39
End: 2024-09-06

## 2024-09-06 ENCOUNTER — TELEPHONE (OUTPATIENT)
Facility: CLINIC | Age: 39
End: 2024-09-06

## 2024-09-06 ENCOUNTER — OFFICE VISIT (OUTPATIENT)
Facility: CLINIC | Age: 39
End: 2024-09-06
Payer: MEDICARE

## 2024-09-06 DIAGNOSIS — I63.89 CEREBROVASCULAR ACCIDENT (CVA) DUE TO OTHER MECHANISM (HCC): Primary | ICD-10-CM

## 2024-09-06 DIAGNOSIS — I63.9 ACUTE CVA (CEREBROVASCULAR ACCIDENT) (HCC): Primary | ICD-10-CM
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
## 2024-09-06 DIAGNOSIS — M53.3 PAIN IN THE COCCYX: ICD-10-CM

## 2024-09-06 PROCEDURE — 97530 THERAPEUTIC ACTIVITIES: CPT

## 2024-09-06 NOTE — TELEPHONE ENCOUNTER
Tracy Flores Physical Therapist called to report patient had a fall and landed on bottom. States patient has complained of having soreness and difficulty adjusting when transition from sitting down to standing up. Pt concerned patient might have fracture in tailbone.       Physical Therapist inquiring if should hold off on any leg exercises for patient.      Call back # [phone].    Please advise.  Thank you

## 2024-09-06 NOTE — TELEPHONE ENCOUNTER
Called PCP office to report patient's recent fall and reports of sharp pain in bottom area/tailbone when transitioning from sitting to standing. Spoke with clinical staff who said that patient should be seen by PCP to determine next steps and that they will reach out to patient to schedule. Inquired about PT/leg exercise; they will reach out to PCP to seek clearance.

## 2024-09-06 NOTE — PROGRESS NOTES
OT Daily Note     Today's date: 2024  Patient name: Mateus Mckeon  : 1985  MRN: [id number]  Referring provider: Dania Sher DO  Dx:   Encounter Diagnosis     ICD-10-CM    1. Cerebrovascular accident (CVA) due to other mechanism (HCC)  I63.89           Start Time: 0845  Stop Time: 0920  Total time in clinic (min): 35 minutes    PLAN OF CARE START: 24  PLAN OF CARE END: 2024 (extended as of )  FREQUENCY: 2x/wk  PRECAUTIONS: Renal failure, actively going through dialysis; type 1 diabetes; HTN; SAH; seizure (last one was 2019)  ON FLUID RESTRICTIONS--DO NOT OFFER WATER    Subjective: Pt states he would like to work on updating his HEP today.     Objective: See treatment diary below    TA:   Today's session focused on updating patient's HEP thru [employer]. Went thru all UE strengthening exercises, hand exercises with theraputty, and FMC exercises. Pt downloaded the application for [employer] on his phone during session and therapist assisted with logging in and going thru how to use the application to facilitate good carryover of HEP. Pt was receptive to all education provided and stated this would help him be more consistent with his HEP when he eventually discharges from therapy. Pt was instructed to contact this therapist with any questions about his HEP, and how it could be updated thru the [employer] carrol.     Assessment: Pt tolerated session well today.  Pt would benefit from continued skilled occupational therapy services to improve fine motor coordination, dexterity, stregnth, UE function, and functional cognition.    Plan: Continue per plan of care.       SHORT TERM GOALS ADDED 24:  Pt will increase sustained attention by completing trail making part A in 35 seconds to complete IADLs NOT MET  Pt will increase divided attention by completing trail making part B in 1 minute 30 seconds to complete IADLs NOT MET  Pt will increase delayed recall and recall 4 /5 items on MOCA  to complete IADLs GOAL MET    LONG TERM GOALS ADDED 2/20/24:  Pt will increase sustained attention by completing trail making part A in 38 seconds to complete IADLs  Pt will increase divided attention by completing trail making part B in 1 minute 10 seconds to complete IADLs  Pt will increase delayed recall and recall 5/5 items on MOCA to complete IADLs  Resume right  hand dominance to refined functional assist with all activities of daily living

## 2024-09-06 NOTE — TELEPHONE ENCOUNTER
Please let patient know that I have placed an xray of the coccyx for evaluation of sacrum pain post fall.  Can hold of on leg exercises until results are available.

## 2024-09-06 NOTE — PROGRESS NOTES
Daily Note     Today's date: 2024  Patient name: Mateus Mckeon  : 1985  MRN: [id number]  Referring provider: Dania Sher DO  Dx:   Encounter Diagnosis     ICD-10-CM    1. Acute CVA (cerebrovascular accident) (HCC)  I63.9                     POC expires Auth Status Total   Visits  Start date  Expiration date PT/OT + Visit Limit? Co-Insurance   24     bomn                                                Subjective: Patient reports he is experiencing soreness in his bottom rated 4/10 following his fall Tuesday, reports sharp pain occurs with change in position e.g. seated to standing. Reports pain is improving since his fall.      Reports wound on his foot is doing better.     Objective: See treatment diary below  *FLUID RESTRICTION- do not offer water*     BP start of session: 138/90 mmHg   Per [employer]: >180/110 mmHg at rest, or >250/115 mmHg with activity, need to stop activity and re-assess after 5 minutes.      TA:   - Clinical discussion re: recent fall (see subjective). Advised patient seek medical care to rule out fracture, etc. Patient reported he would like to wait until next PCP visit in a few weeks.   - Called PCP with patient permission to report recent fall and associated pain in tailbone with transition from seated to standing. See phone call in chart.   - Patient education: relayed that PCP will reach out to patient; advised seeking higher level of medical care sooner with increase in pain or other abnormal/adverse symptoms; he verbalized understanding.     Assessment: Held exercise today due to reports of sharp pain with transition from seated to standing following recent fall onto bottom and in consideration to patient's medical history. Patient would benefit from continued PT to improve balance, endurance, and reduce fall risk.       Plan: Continue per plan of care.  Gait belt throughout session. Continue to incorporate more balance activities on foam  surfaces            Outcome Measures Initial Eval  1/30/24 Re-eval  2/20/24 PN  4/2/2024 RE  5/2/2024 RE   6/17/24 RE  7/15/24 PN   9/4/2024   5xSTS 31.25 sec 52.20 sec no UE's    (1 LOB) 19.91 sec no UE 20.14 sec  No UE  15.84 sec  No UE 11.34 sec  No UE 13.67 sec   no UE   TUG 16.3 sec with rollator    23.6 sec no AD 20.68 sec with rollator    18.44 sec no AD 23.41 sec with rollator    12.92 sec no AD 19.69 sec with rollator    12.45 sec no AD  15.79 secs with rollator    11.54 secs no AD 14.37 sec, w/ rollator      10.62 sec, no AD 11.48 sec   no AD   10 meter  Next visit> 0.88 m/s 1.08 m/s Self selected:  .80 m/s  Fast: 1.29 m/s 1.16 self selected pace 1.11 m/s self selected pace   MEDRANO  Next visit> 44/56 48/56 48/56 47/56 49/56   FGA 12/30 Next visit> 12/30 12/30 14/30 16/30 15/30   6MWT 900 ft 710 ft  Rollator  760 ft no  ft  No AD  885 ft no AD 1050 ft no  ft no AD   mCTSIB  FTEO firm  FTEC firm  FTEO foam  FTEC foam   Next visit> 30 sec  30 sec  5.5 sec  defer 30 sec  30 sec  30 sec  1 sec 30 sec  30 sec  9 secs  Unable to maintain 30 sec  30 sec  30 sec +  3 sec 30 sec  30 sec  30 sec  Unable   ABC  65% 43.75% 56.88% 61.88% 58.75% 66.88%

## 2024-09-07 DIAGNOSIS — S90.811A ABRASION OF RIGHT FOOT, INITIAL ENCOUNTER: ICD-10-CM

## 2024-09-09 NOTE — DISCHARGE SUMMARY
[facility]  Discharge- Mateus Mckeon 1985, 38 y.o. male MRN: [id number]  Unit/Bed#: [facility] 718-01 Encounter: [id number]  Primary Care Provider: Dania Sher DO   Date and time admitted to hospital: 1/5/2024  5:14 AM    Primary hypertension  Assessment & Plan    Continue weekly clonidine patch, hydralazine 100 mg 3 times daily, labetalol 800 mg 3 times daily, lisinopril 40 mg daily and Procardia 90 mg p.o. twice daily (changed while inpatient from 60mg am/90mg pm)  Also managed with volume removal with HD      Subarachnoid hemorrhage (HCC)  Assessment & Plan  Presented with significant headaches started since 12/31/2023  Right basilar cistern SAH of unclear etiology  S/p Cerebral angiography x 2 - both negative (Dr. Mittal, 1/5/2024, 1/12/2024)4.   On ASA for previous strokes reversed with DDAVP.  MRIs negative for source of SAH, small scattered acute/subacute infarcts noted.   CT head wo 1/9/24: Improving subarachnoid hemorrhage compared to recent prior studies. No new findings.   ASA 81 mg daily resumed and Nimodipine discontinued per NSX recs 1/12.  No further need for SAH pathway per NSx.  Neurosurgery follow-up outpatient  Patient discharged with home PT/OT.      End stage kidney disease (HCC)  Assessment & Plan  Continue dialysis Monday/Wednesday/Friday    Anemia in chronic kidney disease, on chronic dialysis (HCC)  Assessment & Plan  Hemoglobin stable.      Type 1 diabetes mellitus (HCC)  Assessment & Plan  Lab Results   Component Value Date    HGBA1C 6.0 (H) 01/14/2024       Recent Labs     01/21/24  2300 01/22/24  0551 01/22/24  2344 01/23/24  1116   POCGLU 182* 152* 170* 192*         Blood Sugar Average: Last 72 hrs:  (P) [id number]    Blood glucose very well controlled with patient managing his own pump  Continue using pump  Endocrinology follow-up    * Acute CVA (cerebrovascular accident) (HCC)  Assessment & Plan  Patient developed RUE  The patient's goals for the shift include      The clinical goals for the shift include To remain HD stable         "weakness with mild facial droop on 1/12  Hx of prior cerebellar and pontine strokes  MRI brain \"Crescendo multifocal acute/recent infarctions involving at least 3 discrete vascular territories (left posterior cerebral artery and bilateral middle cerebral artery territories), worrisome for multifocal new/interval embolic infarctions\"  Continue ASA 81 mg daily and atorvastatin 40mg daily.   Echo 1/5 with no PFO  Cleared for neurology standpoint   Patient will need to Follow up with Neurosurgery in 6 weeks for MRI  PT/OT/PMR recommending rehab at discharge.  Unfortunately some facilities would not allow patient to manage his own insulin with pump.  He had an accepting facility but ultimately patient and family decided to go home with PT/OT        Medical Problems       Resolved Problems  Date Reviewed: 1/24/2024   None       Discharging Physician / Practitioner: Court Edwards MD  PCP: Dania Sher DO  Admission Date:   Admission Orders (From admission, onward)       Ordered        01/05/24 0519  Inpatient Admission  Once                          Discharge Date: 01/23/24    Consultations During Hospital Stay:  Neurosurgery  Nephrology  Endocrinology  Podiatry  ENT  Neurology  Physical medicine and rehab    Procedures Performed:   Dialysis    Significant Findings / Test Results:   None    Incidental Findings:   None    Test Results Pending at Discharge (will require follow up):   None     Outpatient Tests Requested:  None    Complications: None    Reason for Admission: Headache    Hospital Course:   Mateus Mckeon is a 38 y.o. male patient who originally presented to the hospital on 1/5/2024 due to progressively worsening headache.  He underwent cerebral angiogram with neurosurgery on 1/5.  MRI brain showed multifocal acute infarction in 3 discrete vascular territories concerning for embolic infarcts.  Infarct suspected to be a complication of cerebral angiogram.  He had a repeat cerebral angiogram with " "neurosurgery on 1/12 which was negative for SAH.  Aspirin was started.  Course was complicated by hypotension requiring up titration of medications.  Renal artery vascular ultrasound showed no significant arterial occlusive disease in the renal arteries.  While in the hospital patient was evaluated by nephrology and underwent dialysis while admitted along with endocrinology for insulin pump management.  He was also evaluated by ENT for right mastoid opacification found on imaging, given no signs of infection or concerning features no ENT interventions recommended.  Patient was evaluated by PT/OT and physical medicine and rehab who recommended rehab at the time of discharge, unfortunately facilities did not accept patient with himself managing his insulin pump.  On day of discharge patient had an accepting facility but ultimately he and family members decided to go home with PT/OT    Please see above list of diagnoses and related plan for additional information.     Condition at Discharge: stable    Discharge Day Visit / Exam:   Subjective: No acute events overnight.  Patient feels well this morning.  Ambulating with walker, feels stable with no dizziness.  Tolerating oral intake.  Vitals: Blood Pressure: 154/79 (01/23/24 1450)  Pulse: 69 (01/23/24 1450)  Temperature: 98.7 °F (37.1 °C) (01/23/24 1450)  Temp Source: Oral (01/23/24 1450)  Respirations: 16 (01/22/24 1531)  Height: 5' 5\" (165.1 cm) (01/05/24 0731)  Weight - Scale: 94.5 kg (208 lb 5.4 oz) (01/23/24 0600)  SpO2: 98 % (01/23/24 1450)  Exam:   Physical Exam  Vitals and nursing note reviewed.   Constitutional:       General: He is not in acute distress.     Appearance: He is well-developed.   HENT:      Head: Normocephalic and atraumatic.   Eyes:      Conjunctiva/sclera: Conjunctivae normal.   Cardiovascular:      Rate and Rhythm: Normal rate and regular rhythm.   Pulmonary:      Effort: Pulmonary effort is normal. No respiratory distress.      Breath sounds: " Normal breath sounds.   Musculoskeletal:         General: No swelling.      Cervical back: Neck supple.   Skin:     General: Skin is warm and dry.   Neurological:      Mental Status: He is alert.   Psychiatric:         Mood and Affect: Mood normal.         Behavior: Behavior normal.          Discussion with Family: Updated  (father and mother) at bedside.    Discharge instructions/Information to patient and family:   See after visit summary for information provided to patient and family.      Provisions for Follow-Up Care:  See after visit summary for information related to follow-up care and any pertinent home health orders.      Mobility at time of Discharge:   Basic Mobility Inpatient Raw Score: 20  JH-HLM Goal: 6: Walk 10 steps or more  JH-HLM Achieved: 7: Walk 25 feet or more  HLM Goal achieved. Continue to encourage appropriate mobility.     Disposition:   Home with VNA Services (Reminder: Complete face to face encounter)    Planned Readmission: no     Discharge Statement:  I spent 35 minutes discharging the patient. This time was spent on the day of discharge. I had direct contact with the patient on the day of discharge. Greater than 50% of the total time was spent examining patient, answering all patient questions, arranging and discussing plan of care with patient as well as directly providing post-discharge instructions.  Additional time then spent on discharge activities.    Discharge Medications:  See after visit summary for reconciled discharge medications provided to patient and/or family.      **Please Note: This note may have been constructed using a voice recognition system**

## 2024-09-10 DIAGNOSIS — N18.30 ACUTE RENAL FAILURE WITH ACUTE TUBULAR NECROSIS SUPERIMPOSED ON STAGE 3 CHRONIC KIDNEY DISEASE (HCC): ICD-10-CM

## 2024-09-10 DIAGNOSIS — N18.2: ICD-10-CM

## 2024-09-10 DIAGNOSIS — E72.11 HYPERHOMOCYSTEINEMIA (HCC): ICD-10-CM

## 2024-09-10 DIAGNOSIS — H53.30 BINOCULAR VISUAL DISTURBANCE: ICD-10-CM

## 2024-09-10 DIAGNOSIS — R80.1 PERSISTENT PROTEINURIA: ICD-10-CM

## 2024-09-10 DIAGNOSIS — N17.0: ICD-10-CM

## 2024-09-10 DIAGNOSIS — E55.9 VITAMIN D DEFICIENCY: ICD-10-CM

## 2024-09-10 DIAGNOSIS — E83.39 HYPERPHOSPHATEMIA: ICD-10-CM

## 2024-09-10 DIAGNOSIS — I63.9 CEREBROVASCULAR ACCIDENT (CVA) OF LEFT PONTINE STRUCTURE (HCC): ICD-10-CM

## 2024-09-10 DIAGNOSIS — Z86.73 HISTORY OF LACUNAR CEREBROVASCULAR ACCIDENT (CVA): ICD-10-CM

## 2024-09-10 DIAGNOSIS — G35 OPTIC NEURITIS DUE TO MULTIPLE SCLEROSIS (HCC): ICD-10-CM

## 2024-09-10 DIAGNOSIS — H51.0 BINOCULAR VISION DISORDER WITH CONJUGATE GAZE PALSY: ICD-10-CM

## 2024-09-10 DIAGNOSIS — R79.89 AZOTEMIA: ICD-10-CM

## 2024-09-10 DIAGNOSIS — R11.0 NAUSEA: ICD-10-CM

## 2024-09-10 DIAGNOSIS — I63.9 CEREBROVASCULAR ACCIDENT (CVA), UNSPECIFIED MECHANISM (HCC): ICD-10-CM

## 2024-09-10 DIAGNOSIS — E10.21 TYPE 1 DIABETES MELLITUS WITH DIABETIC NEPHROPATHY, WITH LONG-TERM CURRENT USE OF INSULIN (HCC): Chronic | ICD-10-CM

## 2024-09-10 DIAGNOSIS — I16.0 HYPERTENSIVE URGENCY: ICD-10-CM

## 2024-09-10 DIAGNOSIS — H53.8 BLURRED VISION: ICD-10-CM

## 2024-09-10 DIAGNOSIS — H46.9 OPTIC NEURITIS DUE TO MULTIPLE SCLEROSIS (HCC): ICD-10-CM

## 2024-09-10 DIAGNOSIS — N17.0 ACUTE RENAL FAILURE WITH ACUTE TUBULAR NECROSIS SUPERIMPOSED ON STAGE 3 CHRONIC KIDNEY DISEASE (HCC): ICD-10-CM

## 2024-09-10 RX ORDER — ATORVASTATIN CALCIUM 40 MG/1
40 TABLET, FILM COATED ORAL EVERY EVENING
Qty: 90 TABLET | Refills: 1 | Status: SHIPPED | OUTPATIENT
Start: 2024-09-10

## 2024-09-11 ENCOUNTER — APPOINTMENT (OUTPATIENT)
Facility: CLINIC | Age: 39
End: 2024-09-11
Payer: MEDICARE

## 2024-09-11 ENCOUNTER — OFFICE VISIT (OUTPATIENT)
Facility: CLINIC | Age: 39
End: 2024-09-11
Payer: MEDICARE

## 2024-09-11 DIAGNOSIS — I63.89 CEREBROVASCULAR ACCIDENT (CVA) DUE TO OTHER MECHANISM (HCC): Primary | ICD-10-CM

## 2024-09-11 PROCEDURE — 97530 THERAPEUTIC ACTIVITIES: CPT

## 2024-09-11 PROCEDURE — 97112 NEUROMUSCULAR REEDUCATION: CPT

## 2024-09-11 NOTE — PROGRESS NOTES
OT Daily Note     Today's date: 2024  Patient name: Mateus Mckeon  : 1985  MRN: [id number]  Referring provider: Dania Sher DO  Dx:   Encounter Diagnosis     ICD-10-CM    1. Cerebrovascular accident (CVA) due to other mechanism (HCC)  I63.89           Start Time: 0845  Stop Time: 0930  Total time in clinic (min): 45 minutes    PLAN OF CARE START: 24  PLAN OF CARE END: 2024 (extended as of )  FREQUENCY: 2x/wk  PRECAUTIONS: Renal failure, actively going through dialysis; type 1 diabetes; HTN; SAH; seizure (last one was 2019)  ON FLUID RESTRICTIONS--DO NOT OFFER WATER    Subjective: Pt states he would like to work on updating his HEP today.     Objective: See treatment diary below    TA:   -Reviewed use of [employer] phone application from previous visit with the pt as pt states he was having difficulty navigating the carrol. Pt verbalized a better understanding afterward.   -Performed grasp/release exercise with metal gripper on 3rd level to  foam blocks and place into container provided. Activity focused on functional reaching,  strength. 1 lb wrist weight was donned to provide proprioceptive input.     NMR:  -Performed 2x IQ link puzzles to work on FMC, dexterity,  skills, problem solving. Pt did well to complete with min cues from the therapist.     Assessment: Pt tolerated session well today.  Pt would benefit from continued skilled occupational therapy services to improve fine motor coordination, dexterity, stregnth, UE function, and functional cognition.    Plan: Continue per plan of care.       SHORT TERM GOALS ADDED 24:  Pt will increase sustained attention by completing trail making part A in 35 seconds to complete IADLs NOT MET  Pt will increase divided attention by completing trail making part B in 1 minute 30 seconds to complete IADLs NOT MET  Pt will increase delayed recall and recall 4 /5 items on MOCA to complete IADLs GOAL MET    LONG TERM GOALS  ADDED 2/20/24:  Pt will increase sustained attention by completing trail making part A in 38 seconds to complete IADLs  Pt will increase divided attention by completing trail making part B in 1 minute 10 seconds to complete IADLs  Pt will increase delayed recall and recall 5/5 items on MOCA to complete IADLs  Resume right  hand dominance to refined functional assist with all activities of daily living

## 2024-09-12 ENCOUNTER — HOSPITAL ENCOUNTER (OUTPATIENT)
Dept: RADIOLOGY | Facility: HOSPITAL | Age: 39
Discharge: HOME | End: 2024-09-12
Payer: MEDICARE
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
## 2024-09-12 DIAGNOSIS — M53.3 PAIN IN THE COCCYX: ICD-10-CM

## 2024-09-12 PROCEDURE — 72220 X-RAY EXAM SACRUM TAILBONE: CPT

## 2024-09-13 ENCOUNTER — APPOINTMENT (OUTPATIENT)
Facility: CLINIC | Age: 39
End: 2024-09-13
Payer: MEDICARE

## 2024-09-13 ENCOUNTER — OFFICE VISIT (OUTPATIENT)
Facility: CLINIC | Age: 39
End: 2024-09-13
Payer: MEDICARE

## 2024-09-13 DIAGNOSIS — I63.89 CEREBROVASCULAR ACCIDENT (CVA) DUE TO OTHER MECHANISM (HCC): Primary | ICD-10-CM

## 2024-09-13 PROCEDURE — 97110 THERAPEUTIC EXERCISES: CPT

## 2024-09-13 PROCEDURE — 97530 THERAPEUTIC ACTIVITIES: CPT

## 2024-09-13 NOTE — PROGRESS NOTES
OT Daily Note     Today's date: 2024  Patient name: Mateus Mckeon  : 1985  MRN: [id number]  Referring provider: Dania Sher DO  Dx:   Encounter Diagnosis     ICD-10-CM    1. Cerebrovascular accident (CVA) due to other mechanism (HCC)  I63.89           Start Time: 0845  Stop Time: 0930  Total time in clinic (min): 45 minutes    PLAN OF CARE START: 24  PLAN OF CARE END: 2024 (extended as of )  FREQUENCY: 2x/wk  PRECAUTIONS: Renal failure, actively going through dialysis; type 1 diabetes; HTN; SAH; seizure (last one was 2019)  ON FLUID RESTRICTIONS--DO NOT OFFER WATER    Subjective: Pt states he would like to work on updating his HEP today.     Objective: See treatment diary below    TE:   Performed green flex bar bends 30x upward and 30x downward to work on  strength and motor control  Performed hammer rotations with elbow at 90 to work on supination/pronation 20x, then performed with shoulder at 90 degrees flexion and elbow in full extension for 20 reps.    TA:   -Completed [place] board with pt using three point tweezer to  all beads. Working on FMC, grasp/release, graded control.   -Educated pt on use of dice at home to engage in various games to work on FMC, dexterity, and in hand manipulation.     Assessment: Pt tolerated session well today.  Pt would benefit from continued skilled occupational therapy services to improve fine motor coordination, dexterity, stregnth, UE function, and functional cognition.    Plan: Continue per plan of care.       SHORT TERM GOALS ADDED 24:  Pt will increase sustained attention by completing trail making part A in 35 seconds to complete IADLs NOT MET  Pt will increase divided attention by completing trail making part B in 1 minute 30 seconds to complete IADLs NOT MET  Pt will increase delayed recall and recall 4 /5 items on MOCA to complete IADLs GOAL MET    LONG TERM GOALS ADDED 24:  Pt will increase sustained  attention by completing trail making part A in 38 seconds to complete IADLs  Pt will increase divided attention by completing trail making part B in 1 minute 10 seconds to complete IADLs  Pt will increase delayed recall and recall 5/5 items on MOCA to complete IADLs  Resume right  hand dominance to refined functional assist with all activities of daily living

## 2024-09-18 ENCOUNTER — OFFICE VISIT (OUTPATIENT)
Facility: CLINIC | Age: 39
End: 2024-09-18
Payer: MEDICARE

## 2024-09-18 ENCOUNTER — APPOINTMENT (OUTPATIENT)
Facility: CLINIC | Age: 39
End: 2024-09-18
Payer: MEDICARE

## 2024-09-18 DIAGNOSIS — I63.89 CEREBROVASCULAR ACCIDENT (CVA) DUE TO OTHER MECHANISM (HCC): Primary | ICD-10-CM

## 2024-09-18 PROCEDURE — 97110 THERAPEUTIC EXERCISES: CPT | Performed by: OCCUPATIONAL THERAPIST

## 2024-09-18 PROCEDURE — 97112 NEUROMUSCULAR REEDUCATION: CPT | Performed by: OCCUPATIONAL THERAPIST

## 2024-09-18 NOTE — PROGRESS NOTES
OT Daily Note     Today's date: 2024  Patient name: Mateus Mckeon  : 1985  MRN: [id number]  Referring provider: Dania Sher DO  Dx:   Encounter Diagnosis     ICD-10-CM    1. Cerebrovascular accident (CVA) due to other mechanism (HCC)  I63.89                      PLAN OF CARE START: 24  PLAN OF CARE END: 2024 (extended as of )  FREQUENCY: 2x/wk  PRECAUTIONS: Renal failure, actively going through dialysis; type 1 diabetes; HTN; SAH; seizure (last one was 2019)  ON FLUID RESTRICTIONS--DO NOT OFFER WATER    Subjective: Pt states he would like to work on FMC today.    Objective: See treatment diary below    TE:   -Performed hand gripper with 25lbs resistance, 3 second isometric holds x6 minutes focusing on improved gross grasp strength. Recommend increasing resistance in future sessions.    NMR:  -Wooden sudoku setup and solve with board on slant board, 2lb wrist weight to RUE focusing on FMC, EF, proximal strength      Assessment: Pt tolerated session well today. Difficulty with FMC during sudoku activity. Pt would benefit from continued skilled occupational therapy services to improve fine motor coordination, dexterity, stregnth, UE function, and functional cognition.    Plan: Continue per plan of care.       SHORT TERM GOALS ADDED 24:  Pt will increase sustained attention by completing trail making part A in 35 seconds to complete IADLs NOT MET  Pt will increase divided attention by completing trail making part B in 1 minute 30 seconds to complete IADLs NOT MET  Pt will increase delayed recall and recall 4 /5 items on MOCA to complete IADLs GOAL MET    LONG TERM GOALS ADDED 24:  Pt will increase sustained attention by completing trail making part A in 38 seconds to complete IADLs  Pt will increase divided attention by completing trail making part B in 1 minute 10 seconds to complete IADLs  Pt will increase delayed recall and recall 5/5 items on MOCA to complete  IADLs  Resume right  hand dominance to refined functional assist with all activities of daily living

## 2024-09-19 ENCOUNTER — OFFICE VISIT (OUTPATIENT)
Age: 39
End: 2024-09-19
Payer: MEDICARE

## 2024-09-19 VITALS
OXYGEN SATURATION: 99 % | DIASTOLIC BLOOD PRESSURE: 70 MMHG | BODY MASS INDEX: 27.69 KG/M2 | HEIGHT: 71 IN | SYSTOLIC BLOOD PRESSURE: 136 MMHG | TEMPERATURE: 98.2 F | WEIGHT: 197.8 LBS | HEART RATE: 78 BPM | RESPIRATION RATE: 16 BRPM

## 2024-09-19 DIAGNOSIS — M25.552 LEFT HIP PAIN: ICD-10-CM

## 2024-09-19 DIAGNOSIS — Z23 ENCOUNTER FOR VACCINATION: ICD-10-CM

## 2024-09-19 DIAGNOSIS — Z91.81 STATUS POST FALL: ICD-10-CM

## 2024-09-19 DIAGNOSIS — I15.0 RENOVASCULAR HYPERTENSION: Primary | Chronic | ICD-10-CM

## 2024-09-19 DIAGNOSIS — G47.33 OSA (OBSTRUCTIVE SLEEP APNEA): Chronic | ICD-10-CM

## 2024-09-19 PROCEDURE — G0008 ADMIN INFLUENZA VIRUS VAC: HCPCS | Performed by: INTERNAL MEDICINE

## 2024-09-19 PROCEDURE — 90656 IIV3 VACC NO PRSV 0.5 ML IM: CPT | Performed by: INTERNAL MEDICINE

## 2024-09-19 PROCEDURE — 99214 OFFICE O/P EST MOD 30 MIN: CPT | Performed by: INTERNAL MEDICINE

## 2024-09-19 NOTE — ASSESSMENT & PLAN NOTE
-requests referral to switch Sleep Medicine, referral placed  Orders:    Ambulatory Referral to Sleep Medicine; Future

## 2024-09-19 NOTE — ASSESSMENT & PLAN NOTE
-as he continues to have acute on chronic dizziness with low BP, agree with recommendation to change labetalol to prn.  -he will remain on olmesartan 20mg daily, nifedipine 90mg daily  -follow up with Renal

## 2024-09-19 NOTE — PROGRESS NOTES
Ambulatory Visit  Name: Mateus Mckeon      : 1985      MRN: [id number]  Encounter Provider: Dania Sher DO  Encounter Date: 2024   Encounter department: [facility] INTERNAL MEDICINE    Assessment & Plan  Renovascular hypertension  -as he continues to have acute on chronic dizziness with low BP, agree with recommendation to change labetalol to prn.  -he will remain on olmesartan 20mg daily, nifedipine 90mg daily  -follow up with Renal         Status post fall  -mechanical fall with L hip pain and weakness  -sacrum/coccyx xray neg for fracture  -resume PT  Orders:    Ambulatory Referral to Physical Therapy; Future    Left hip pain  -resume PT  Orders:    Ambulatory Referral to Physical Therapy; Future    RACHEAL (obstructive sleep apnea)  -requests referral to switch Sleep Medicine, referral placed  Orders:    Ambulatory Referral to Sleep Medicine; Future    Encounter for vaccination    Orders:    influenza vaccine preservative-free 0.5 mL IM (Fluzone, Afluria, Fluarix, Flulaval)       History of Present Illness     HPI    He is accompanied by his mother.    Has had three strokes, this is longest that he has been with PT.  He is motivated to do exercises and has been more receptive.    He did not have PT in August but continued with OT.  Earlier this month, he was re-evaluated by PT and was told he needed another medical office visit before PT is to be resumed.    He fell, landed on his sacrum.  Buttock pain did not start until several days later.  Xray was negative for fracture.  Admits he was having hip pain before the fall.  He is avoiding prolonged sitting and sometimes keeps him up at night.     He is always dizzy and when it is more severe he knows his BP is too low.   He is more cautious then.  He admits that he adjusted his labetalol 200mg in AM and continues with 400mg at lunch and dinner.    History obtained from : patient and patient's mother    Review of Systems    Constitutional:         Gradual weight loss   Musculoskeletal:  Positive for arthralgias and gait problem.   Neurological:  Positive for dizziness.       Medical History Reviewed by provider this encounter:       Current Outpatient Medications on File Prior to Visit   Medication Sig Dispense Refill    aspirin (ECOTRIN LOW STRENGTH) 81 mg EC tablet Take 81 mg by mouth daily      atorvastatin (LIPITOR) 40 mg tablet TAKE 1 TABLET BY MOUTH ONCE DAILY IN THE EVENING 90 tablet 1    b complex vitamins capsule Take 1 capsule by mouth daily before lunch       calcium acetate (PHOSLO) capsule Take 1 capsule (667 mg total) by mouth 3 (three) times a day 90 capsule 0    cinacalcet (SENSIPAR) 60 MG tablet Take 1 tablet (60 mg total) by mouth daily 30 tablet 0    escitalopram (LEXAPRO) 20 mg tablet Take 1 tablet (20 mg total) by mouth daily 90 tablet 2    famotidine (PEPCID) 40 MG tablet TAKE 1 TABLET BY MOUTH IN THE MORNING 90 tablet 1    gabapentin (NEURONTIN) 100 mg capsule TAKE 1 CAPSULE BY MOUTH IN THE MORNING AND 2 CAPSULES AT BEDTIME. 90 capsule 3    GLUCAGON EMERGENCY 1 MG injection   3    Gvoke HypoPen 1-Pack 1 MG/0.2ML SOAJ       hydrOXYzine HCL (ATARAX) 10 mg tablet Take 1 tablet (10 mg total) by mouth every 6 (six) hours as needed for itching or anxiety 30 tablet 3    Insulin Disposable Pump (Omnipod 5 G6 Intro, Gen 5,) KIT       Insulin Disposable Pump (Omnipod 5 G6 Pod, Gen 5,) MISC       labetalol (NORMODYNE) 200 mg tablet Take 2 tablets (400 mg total) by mouth 3 (three) times a day (Patient taking differently: Take 400 mg by mouth 3 (three) times a day 200mg in AM, 400mg at lunch and dinner) 180 tablet 0    NIFEdipine (PROCARDIA XL) 90 mg 24 hr tablet Take 90 mg by mouth daily      NovoLOG 100 UNIT/ML injection       olmesartan (BENICAR) 40 mg tablet Take 40 mg by mouth daily      ondansetron (ZOFRAN) 4 mg tablet Take 1 tablet (4 mg total) by mouth every 8 (eight) hours as needed for nausea or vomiting 90 tablet  "1    Patiromer Sorbitex Calcium (VELTASSA PO) Take 5 g by mouth once a week      saccharomyces boulardii (FLORASTOR) 250 mg capsule Take 250 mg by mouth daily      SPS 15 GM/60ML suspension TAKE 60 ML BY MOUTH SINGLE DOSE FOR EMERGENCY USE DURING MISSED HEMODIALYSIS      traZODone (DESYREL) 50 mg tablet TAKE 1/2 (ONE-HALF) TABLET BY MOUTH ONCE DAILY AT BEDTIME AS NEEDED FOR SLEEP 30 tablet 2    metoclopramide (REGLAN) 5 mg tablet Take 1 tablet by mouth three times daily as needed (Patient not taking: Reported on 2024) 90 tablet 0    mupirocin (BACTROBAN) 2 % ointment Apply topically 2 (two) times a day (Patient not taking: Reported on 2024) 22 g 0    [DISCONTINUED] hydrALAZINE (APRESOLINE) 100 MG tablet Take 1 tablet (100 mg total) by mouth 3 (three) times a day (Patient not taking: Reported on 7/10/2024) 90 tablet 0     No current facility-administered medications on file prior to visit.      Social History     Tobacco Use    Smoking status: Former     Current packs/day: 0.00     Average packs/day: 0.5 packs/day for 12.0 years (6.0 ttl pk-yrs)     Types: Cigarettes     Start date: 2006     Quit date: 2018     Years since quittin.6    Smokeless tobacco: Never    Tobacco comments:     quit 2018   Vaping Use    Vaping status: Every Day    Substances: THC, CBD   Substance and Sexual Activity    Alcohol use: Not Currently    Drug use: Yes     Types: Marijuana     Comment: medical marijuana last vaped 2024    Sexual activity: Not on file         Objective     /70   Pulse 78   Temp 98.2 °F (36.8 °C)   Resp 16   Ht 5' 11\" (1.803 m)   Wt 89.7 kg (197 lb 12.8 oz)   SpO2 99%   BMI 27.59 kg/m²     Physical Exam  Vitals reviewed.   Constitutional:       General: He is not in acute distress.  Cardiovascular:      Rate and Rhythm: Normal rate and regular rhythm.      Pulses: Normal pulses.      Heart sounds: Normal heart sounds.   Pulmonary:      Effort: Pulmonary effort is normal. No " respiratory distress.      Breath sounds: Normal breath sounds. No wheezing.   Musculoskeletal:      Right lower leg: No edema.      Left lower leg: No edema.   Neurological:      Mental Status: He is alert and oriented to person, place, and time.         XR sacrum and coccyx  Narrative: SACRUM AND COCCYX    INDICATION:   Unspecified fall, initial encounter. Sacrococcygeal disorders, not elsewhere classified.     COMPARISON:  None.    VIEWS:  XR SACRUM AND COCCYX   Images: 3     FINDINGS:    There is no evidence of an acute fracture.    Sacral arcuate lines are maintained.    The sacroiliac joints appear symmetric.    Pubic symphysis is maintained.    Visualized lower lumbar spine is unremarkable.  Impression: No acute osseous abnormality.    Electronically signed: 09/12/2024 09:57 AM Miguelangel Bourgeois, MPH,MD

## 2024-09-20 ENCOUNTER — APPOINTMENT (OUTPATIENT)
Facility: CLINIC | Age: 39
End: 2024-09-20
Payer: MEDICARE

## 2024-09-20 ENCOUNTER — OFFICE VISIT (OUTPATIENT)
Facility: CLINIC | Age: 39
End: 2024-09-20
Payer: MEDICARE

## 2024-09-20 DIAGNOSIS — I63.89 CEREBROVASCULAR ACCIDENT (CVA) DUE TO OTHER MECHANISM (HCC): Primary | ICD-10-CM

## 2024-09-20 PROCEDURE — 97530 THERAPEUTIC ACTIVITIES: CPT

## 2024-09-20 PROCEDURE — 97112 NEUROMUSCULAR REEDUCATION: CPT

## 2024-09-20 NOTE — PROGRESS NOTES
"OT Daily Note     Today's date: 2024  Patient name: Mateus Mckeon  : 1985  MRN: [id number]  Referring provider: Dania Sher DO  Dx:   Encounter Diagnosis     ICD-10-CM    1. Cerebrovascular accident (CVA) due to other mechanism (HCC)  I63.89           Start Time: 0800  Stop Time: 0845  Total time in clinic (min): 45 minutes    PLAN OF CARE START: 24  PLAN OF CARE END: 2024 (extended as of )  FREQUENCY: 2x/wk  PRECAUTIONS: Renal failure, actively going through dialysis; type 1 diabetes; HTN; SAH; seizure (last one was 2019)  ON FLUID RESTRICTIONS--DO NOT OFFER WATER    Subjective: Pt reports he did not have a good morning.     Objective: See treatment diary below    TA:   -Performed hand gripper with 25 lbs resistance. Picking up 1\" cubes and placing into container in front of pt, wit pt having 1 lb wrist weight donned. 36 reps. Activity focused on R UE endurance,  strength, UE coordination.     NMR:  -Pt wrapping rubber bands around metal cylinder using R hand to  and manipulate, and L hand to stabilize the container. Focus on R hand FMC, dexterity, and finger extension strength.   -Placing DLM pegs into peg board while picking up two at a time to work on FMC, dexterity, in hand manipulation, proximal stability/control. Pt then removing all pegs with blue clothes pin to work on pinch strength, reaching toward target for UE coordination.     Assessment: Pt tolerated session well today. Required increased time for al FM activities with occasional drops.  Pt would benefit from continued skilled occupational therapy services to improve fine motor coordination, dexterity, stregnth, UE function, and functional cognition.    Plan: Continue per plan of care.       SHORT TERM GOALS ADDED 24:  Pt will increase sustained attention by completing trail making part A in 35 seconds to complete IADLs NOT MET  Pt will increase divided attention by completing trail making part " B in 1 minute 30 seconds to complete IADLs NOT MET  Pt will increase delayed recall and recall 4 /5 items on MOCA to complete IADLs GOAL MET    LONG TERM GOALS ADDED 2/20/24:  Pt will increase sustained attention by completing trail making part A in 38 seconds to complete IADLs  Pt will increase divided attention by completing trail making part B in 1 minute 10 seconds to complete IADLs  Pt will increase delayed recall and recall 5/5 items on MOCA to complete IADLs  Resume right  hand dominance to refined functional assist with all activities of daily living

## 2024-09-25 ENCOUNTER — APPOINTMENT (OUTPATIENT)
Facility: CLINIC | Age: 39
End: 2024-09-25
Payer: MEDICARE

## 2024-09-25 ENCOUNTER — EVALUATION (OUTPATIENT)
Facility: CLINIC | Age: 39
End: 2024-09-25
Payer: MEDICARE

## 2024-09-25 DIAGNOSIS — I63.89 CEREBROVASCULAR ACCIDENT (CVA) DUE TO OTHER MECHANISM (HCC): Primary | ICD-10-CM

## 2024-09-25 PROCEDURE — 97168 OT RE-EVAL EST PLAN CARE: CPT

## 2024-09-25 NOTE — PROGRESS NOTES
"OCCUPATIONAL THERAPY RE-CERTIFICATION    Today's Date: 2024  Patient Name: Mateus Mckeon  : 1985  MRN: [id number]  Referring Provider: Dania Sher,   Dx: Cerebrovascular accident (CVA) due to other mechanism (HCC) [I63.89]      SKILLED ANALYSIS:  Pt presents to re-evaluation on 24 after ~6 months of OT s/p CVA.  Pt states over the past month he notices improvements in his R hand, feels like his hand is less tight. Pt states he is using his R hand more, however it \"does not have the same capabilities as before\".  Based on assessments, pt is demonstrating improved R grasp strength, dexterity, UE coordination, pinch strength. Very large improvement with  strength over the past month (from 40 lbs to 65 lbs). Slight decline with speed on the 9-hole peg test, but good improvement with dexterity on the FDT compared to prior months. Pt is scoring close to perfect on the FMA-UE at this point, however loses one point due to jerky movement with repetitive wrist flexion/extension with elbow at 90. Pt achieved another 2 goals, and two more goals were set today to continue to address  strength, as well as for improving attention, as his score ont he TM parts A&B declined by a large margin since Pt continues to function below normal limits in the following domains: , endurance, FMC R>L, dexterity R>L, R grasp strength, higher level functional cognition.  Recommend continued participation in OP OT 2x/wk for an additional 8-12 weeks with continued focus on aforementioned deficits to maximize functional performance and QOL.  Discussed progress and recommendations with pt and he is in agreement.    Subjective:   : Pt states he feels like his endurance is not where it needs to be. He has been attempting to use his hand as much as he can, however does not see much progress - if any thing he states \"I feel like I'm losing ground\". Pt states he did start using the theraputty and continued using " "foam to work on hand strength. Pt states he is not able to keep up with her exercises on days that he has dialysis due to fatigue.     7/11: Reports inc endurance during daily activities, but still fatigues quicker than baseline.  Able to complete zippers and buttons with inc time, requires assist for tying shoes.  Now able to write his initials and complete basic handwriting with ~80% legibility, significantly inc time and inc sizing    8/21: Pt states he is progressing, he feels like the use of his R hand has come a long way. Pt states he is better able to use his R hand, and is now able to tie his shoes.     9/25: Pt reports he feels like he is continuing to improve, however his R hand is still not where it was. Pt states he has been wearing his night resting hand splint less as he feels like his R hand is not as tight.     PLAN OF CARE START: 9/25/24  PLAN OF CARE END: 12/25/2024  FREQUENCY: 2x/wk  PRECAUTIONS: Renal failure, actively going through dialysis; type 1 diabetes; HTN; SAH; seizure (last one was 2019)    Subjective    Mechanism of Injury  Bill is a 38-year-old male with history of end-stage renal disease on dialysis, hypertension, type 1 diabetes, and recent subarachnoid hemorrhage   Pt reports he went to the hospital and they found a brain bleed and reported he needed an angioplasty. Unfortunately, during the angioplasty he suffered a stroke that affected his R UE. Since his stroke he reports his R UE function has improved although he continues with R hand FMC/dexterity deficits.   Overall, pt requires assistance with 75% of his daily activities due to complex medical presentation as well as increased fatigue post-stroke.   \"There's nothing more like purgatory than using your non dominant side.\"    From Hospital note on 1/14/24:   38-year-old man with prior history of cerebellar and pontine strokes, prothrombin gene mutation, MTHFR gene mutation, diabetes, diabetic nephropathy with ESRD on HD, who " "presented with headache on 1/5 and was found to have subarachnoid hemorrhage in the basilar cisterns.  Unclear etiology.  Patient family reports nausea and retching around the time of headache.     - Underwent conventional angiogram later that day which was unremarkable for any clear source.  - MRI brain on 1/8 demonstrated multiple punctate foci of ischemia in the right MCA, bilateral cerebellar hemispheres, and right velia which may represent sequela of angiograph versus embolic strokes of unclear source (ESUS)   -Repeat angiogram on 1/12 was again unremarkable for source of subarachnoid.     Neurology consulted for right upper extremity weakness noted after second angiogram.  - Repeat MRI demonstrated numerous new embolic appearing foci of ischemia with similar suspected etiology as those mentioned above.     Transcranial Dopplers have been unremarkable with Lindegaard ratios consistently less than 3.     Spontaneous basilar cistern subarachnoid hemorrhage of yet unclear etiology.      Embolic appearing strokes, suspect complication of angiogram however cannot exclude additional embolic source.    Occupational Profile  Pt lives in a home with his parents; has one flight to bedroom and has AD throughout the house (grab bars etc.). Paulo reports he needs help with preparing meals (could get by making 1 meal for himself but not all day), laundry, and driving. He is independent with dressing and bathing, \"but it takes it out of me. I could do some things but I need help throughout the day.\"    It is of note that the pt is actively getting dialysis; has to sit still for 4 hours  Pt reports some numbness in fingers on left side (L sided forearm is where dialysis is put in) and notes difficulty with thinking and memory since stroke. No changes in vision     He enjoys spending time with nieces and nephews and would love to get a dog.    PATIENT GOAL: \"I want to be able to tie my shoes without having to ask my " "parents\"    HISTORY OF PRESENT ILLNESS:     PMH:   Past Medical History:   Diagnosis Date    Acute kidney injury (HCC)     Ambulates with cane     Anuria     Anxiety     Cellulitis of right elbow 03/31/2021    Chronic kidney disease     Depression     Diabetes mellitus (HCC)     Diarrhea     Emesis 10/24/2020    End stage renal disease (HCC) 02/11/2018    Formatting of this note might be different from the original. Last Assessment & Plan:  Secondary to DM.  On nightly PD.  Followed by Nephro.  Patient considering transplant for kidney and pancreas through [facility] Formatting of this note might be different from the original. Last Assessment & Plan:  Formatting of this note might be different from the original. 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    Esophagitis 07/21/2015    Falls     Gastroparesis     GERD (gastroesophageal reflux disease)     History of shingles 2010    History of transfusion 02/2018    no adverse reaction    Hyperlipidemia     Hyperphosphatemia     Hypertension     Hypoglycemia 07/15/2022    Itching     Mastoiditis of right side 07/15/2022    Muscle weakness     general unsteadiness    Obesity (BMI 30.0-34.9) 09/09/2019    Orthostatic hypotension 10/25/2020    Peripheral polyneuropathy 11/20/2019    PONV (postoperative nausea and vomiting) 01/26/2018    Protein-calorie malnutrition (HCC) 11/23/2020    Recurrent peritonitis (HCC) due to peritoneal dialysis catheter 07/31/2020    Retinopathy     Seizures ([facility])     early 2020 - one time    Skin abnormality     some dime size areas where skin was scratched from itching    Sleep apnea     Spontaneous bacterial peritonitis (HCC) 10/19/2020    Squamous cell skin cancer     left temple    Stroke (HCC)     x2 - off balance/no driving/fatigue    Swelling of both lower extremities     Traumatic onycholysis 07/21/2022    Vomiting     Wears glasses        Past Surgical Hx:   Past Surgical History:   Procedure " Laterality Date    CARDIAC ELECTROPHYSIOLOGY PROCEDURE N/A 9/21/2023    Procedure: Cardiac loop recorder explant;  Surgeon: Parish Morgan MD;  [facility]: [facility];  Service: Cardiology    CARDIAC LOOP RECORDER  05/2018    COLONOSCOPY      EGD      EYE SURGERY Right     IR AV FISTULAGRAM/GRAFTOGRAM  02/23/2021    IR CEREBRAL ANGIOGRAPHY  1/12/2024    IR CEREBRAL ANGIOGRAPHY / INTERVENTION  1/5/2024    IR TUNNELED CENTRAL LINE PLACEMENT  02/16/2021    IR TUNNELED DIALYSIS CATHETER PLACEMENT  11/18/2020    IR TUNNELED DIALYSIS CATHETER REMOVAL  02/12/2021    IR TUNNELED DIALYSIS CATHETER REMOVAL  03/11/2021    MOHS SURGERY Left 12/14/2022    Left temple with Dr. Hassan    PERITONEAL CATHETER INSERTION N/A 08/27/2018    Procedure: UNROOF PD CATHETER;  Surgeon: Felipe Lindo DO;  Location: [facility];  Service: General    UT ARTERIOVENOUS ANASTOMOSIS OPEN DIRECT Left 11/09/2020    Procedure: CREATION FISTULA  ARTERIOVENOUS (AV) - LEFT WRIST;  Surgeon: Placido Altamirano MD;  Location: [facility];  Service: Vascular    UT ESOPHAGOGASTRODUODENOSCOPY TRANSORAL DIAGNOSTIC N/A 04/18/2019    Procedure: ESOPHAGOGASTRODUODENOSCOPY (EGD);  Surgeon: Ale Figueroa MD;  Location: [facility];  Service: Gastroenterology    UT LAPS INSERTION TUNNELED INTRAPERITONEAL CATHETER N/A 08/06/2018    Procedure: LAPAROSCOPIC PD CATHETER PLACEMENT;  Surgeon: Felipe Lindo DO;  Location[facility];  Service: General    UT REMOVAL TUNNELED INTRAPERITONEAL CATHETER N/A 11/18/2020    Procedure: REMOVAL CATHETER PERITONEAL DIALYSIS;  Surgeon: Abdifatah Ty MD;  [facility];  Service: General    TONSILLECTOMY      UPPER GASTROINTESTINAL ENDOSCOPY          Pain: FLACC 0     Objective    Upper Extremities:  Pt is right hand dominant    Range of Motion:  AROM:   R UE   - Shoulder flexion: 160* - WFL  -Shoulder abduction: 180* - WNL  - Shoulder extension: WNL  - Elbow flexion WNL  - Elbow extension: WNL  - Wrist flexion: WNL  - Wrist  extension: WNL  - Pronation: WNL  - Supination: 95% AROM  - Finger extension: WNL  - Finger flexion: WNL    L UE : 100%     Manual Muscle Testing:  R UE:  - Shoulder flexors: 5/5  - Shoulder extensors: 5/5  - Shoulder abductors: 5/5   - Shoulder external rotators: 5/5   - Shoulder internal rotators: 5/5  - Elbow flexors: 5/5  - Elbow extensors: 5/5  - Wrist flexors: 5/5  - Wrist extensors: 5/5  L UE:  - Shoulder flexors: 5/5  - Shoulder extensors: 5/5  - Shoulder abductors: 5/5   - Shoulder external rotators: 5/5   - Shoulder internal rotators: 5/5  - Elbow flexors: 5/5  - Elbow extensors: 5/5  - Wrist flexors: 5/5  - Wrist extensors: 5/5                  ANGEL: RUE: 65lb (40lb) LUE: 100lb  The age norm is approximately R: 116.8;bs and L: 112.8 lbs, indicating decreased  strength..                 2 point pinch: RUE: 6.5lb (previously 6lb), LUE: 10.5lb (previously 12lb) (age norms are 18 lbs)   3 point pinch: RUE: 9lb (previously 10.5lb), LUE: 20lb (previously 10lb) (age norms are 24 lbs)   Lateral pinch: RUE: 18lb (previously 14), LUE: 21lb (previously 21lb) (age norms are 25 lbs)                   NINE-HOLE PEG TEST: assesses dexterity/fine motor coordination   R hand: 52 seconds (Prior: 44 seconds (Prior: 1:04 with 2 pegs dropped seconds (previously 1 min 16 second)))  L hand: 47 seconds (previously: 38.84 seconds)  Pt demonstrates decreased FMC compared to norms for age/sex (L: 18.62 seconds and R: 17.71 seconds)     Functional Dexterity Test: assesses patient's ability to use the hand for daily tasks requiring a 3-jaw federico prehension between the fingers and the thumb. Completed in a zig-zag pattern with unaffected UE first  5-second penalty (each):  -Supinating forearm to assist  -Touching the board for assist    10-second penalty:  -Dropping a peg    R UE: 2:49 with 2 drops = 189 seconds (Prior: 3:49 with no compensatory movements and 1 drop = 239 seconds)  L UE:  44.4 seconds (previously: 58.39 seconds)      Norms based on age/sex: (Lucy et. al., 2013)    Age Group Sex Non-Dominant (50th percentile) Dominant (50th percentile)   20-49 Male 23.2 24.1     Pt performance on Functional Dexterity Test implies non functional hand compared to norms for age/sex.    Score by Functional Level Range (dominant hand) Range (non-dominant hand)   Functional  16-25 seconds 18-27 seconds   Moderately Functional  26-33 seconds 28-45 seconds   Minimally Functional 34-50 seconds 46-55 seconds   Nonfunctional >50 seconds >55 seconds     Subluxation: NONE    Scapular winging: MINIMAL    FUGYL JOHNS ASSESSMENT OF MOTOR RECOVERY AFTER STROKE:   R UE:  UPPER EXTREMITY SEATED: 36   WRIST: 9/10   HAND:    COORDINATION/SPEED:    OVERALL SCORE: 65/66   (Clinical change= 5 points, severe impairment <19, and mild impairment is >50)         Functional Cognition:  Highest level of education: some college     [place] Cognitive Assessment Version 8.3 (MoCA V8.3) NOT RE-ASSESSED 24  Visuospatial/executive functionin  Naming:  3/3  Memory: 1st trial:  5, 2nd trial:  5/5  Attention/concentration: 2/2  List of letters: /  Serial Seven Subtraction:  3/3 w/ 0 errors  Language/sentence repetition: 1/2  Language Fluency: 0/1 (previously: 0/1  Abstract/Correlational Thinkin/2   Delayed Recall: 5  Orientation:  6              Memory Index Score: 13/15 (previously: 11/15)  MoCA V1 8.3 Raw Score: 27/30 (previously: 2430), MIS:  13/15, indicative of no neurocognitive impairments.     MoCA Scoring              Normal: 26+              Mild Cognitive Impairment: 18-25               Moderate Cognitive Impairment: 10-17              Severe Cognitive Impairment: <10     Trail making Part A and Part B:  Part A: 70 seconds (prior: 54 seconds I'ly)  Part B: 1:58 with 1 self-corrected error (Prior: 1:28 I'ly (previously 1 minute and 53.92 seconds independently))  Indicating deficits: Part A > 24.40 seconds and Part B > 50.68 seconds        Contextual Memory Test:   Immediate: 8/20 (previously 10/20, initially 3/20)  Delayed:  8/20 (previously 10/20, initially 3/20)    Following re-evaluation completed 2x qbitz puzzles to work on dexterity,  skills.     GOALS:   Short Term Goals:  Pt will increase R UE  strength to 18 lbs to complete IADLs ACHIEVED 5/23  Pt will increase R FMC by being able to don 5 pegs in to 9 hole peg board in 1 minute and 40 seconds on 9 hole peg to complete ADLs and IADLs GOAL MET   Pt will increase  R UE strength to complete 8/14 of hand section of fugyl benavides for tabletop tasks ACHIEVED 5/23/24  Pt will increase  R UE strength to complete 4/6 of coordination section of fugyl benavides for tabletop tasks ACHIEVED 5/23/24        Long Term Goals: 8-12 weeks  Pt will increase R UE  strength to 25 lbs to complete IADLs ACHIEVED 5/23/24  Pt will increase R FMC by being able to don all 9 pegs in to 9 hole peg board in 3 minutes on 9 hole peg to complete ADLs and IADLs GOAL MET   Pt will increase  R UE strength to complete 10/14 of hand section of fugyl benavides for tabletop tasks ACHIEVED 5/23/24  Pt will increase  R UE strength to complete 5/6 of coordination section of fugyl benavides for tabletop tasks ACHIEVED 9/25/24    New Goals as 5/23/24  Pt will demonstrate improved  strength as evidenced by increased ANGEL dynamometer to 40 lbs for improved performance in ADLs/IADLs ACHIEVED   Pt will demonstrate improved lateral pinch strength as evidenced by increase to 6 lbs for improved performance in ADLs/IADLs ACHIEVED  Pt will demonstrate improved three point pinch strength as evidenced by increase to 9 lbs for improved performance in ADLs/IADLs ACHIEVED  Pt will demonstrate improved tip pinch strength as evidenced by increase to 10 lbs for improved performance in ADLs/IADLs PROGRESSING  Pt will demonstrate improved FMC required for ADLs/IADLs as demonstrated by completing 9-hole peg test within 1 minute and 5 seconds Achieved  8/21  Pt will demonstrate improved dexterity required for ADLs/IADLs as demonstrated by completing FDT within 200 seconds, including penalties. ACHIEVED    UPDATED GOALS AS OF 7/11:  Pt will demonstrate improved  strength as evidenced by increased ANGEL dynamometer to 52 lbs for improved performance in ADLs/IADLs ACHIEVED 9/25/24    Pt will demonstrate improved dexterity required for ADLs/IADLs as demonstrated by completing FDT within 180 seconds, including penalties. PROGRESSING    New Goals as of 9/25/24:   Pt will improve  strength by 5 lbs (up to 70 lbs) in the R hand for improved performance during ADLs and IADLs.   Pt will improve time on TM parts B to within 70 seconds demonstrating improved divided attention required for IADLs.     OTHER PLANNED THERAPY INTERVENTIONS:   Supine, seated, and in stance neuro re-ed  Tricep AG  NMES/FES  FMC/prehension  Timed Trials  Manual tx  Hand to target  Sensory re-ed  Seated functional reach: crossing midline  Supine place and hold  WBearing strategies   Closed chain activities  Open chain activities  Internal and external memory aides        Objective Measurement Tracker:    IE (2/15/24) 1st Re-eval:   (3/19/24) 2nd Re-eval:  (4/22/24) 3rd Re-eval:  9/25/24   MoCA 24/30 NOT TODAY/30 23/30 Not reassessed    TM Test A   To be completed next session Not reassessed 70 seconds   TM Test B   To be completed next session Not reassessed 118 seconds   CMT Immediate: 3/20, 0 confabulations  Delayed: 3/20, 0 confabulations NOT TODAY To be completed next session Not reassessed Not assessed   Nine Hole Peg Test R hand: 3 pegs in 1 minute and 48.49 seconds   L hand: 35.14 seconds   R hand: all 9 pegs in and out in 1 minute and 36 seconds    L hand: 39.59 seconds  R hand: all 9 pegs in and out in 1 min. 31 secs. With 2 drops. L hand: 38.84 seconds. 1 min 16 seconds 52 seconds   Functional Dexterity Test NOT TODAY R UE: 2 minutes and 6 seconds to complete 8 with compensatory  strategies including the board and 1 peg drop  L UE: 45 seconds  R UE: 3 mins. 50 secs. To complete with 7 drops/  compensations.   L UE: 58.39 secs. 258 seconds 189 seconds    Strength R: 15lb,   L: 100lb R: 15lbs  L: 100lbs  R: 15 lbs  L: 100 lbs 35 lbs 65 lbs   Lateral Pinch Strength NOT TODAY  Not today   8 lbs 18 lbs   2 Point Pinch Strength NOT TODAY   Not today   4 lbs  6.5 lbs   3 Point Pinch Strength NOT TODAY   Not today  7 lbs  9 lbs   Manual Muscle Testing             Fugl Harrison UE: 36/36  Wrist: 10/10  Hand: 6/14  Coordination: 2/6 NOT TODAY  UE: 28/36  Wrist: 7/10  Hand: 10/14  Coordination:  3/6 UE: 36  Wrist: 7/10  Hand: 12/14  Coordination:   4/6  Overall 59/66 65/66

## 2024-09-27 ENCOUNTER — OFFICE VISIT (OUTPATIENT)
Facility: CLINIC | Age: 39
End: 2024-09-27
Payer: MEDICARE

## 2024-09-27 ENCOUNTER — APPOINTMENT (OUTPATIENT)
Facility: CLINIC | Age: 39
End: 2024-09-27
Payer: MEDICARE

## 2024-09-27 DIAGNOSIS — I63.89 CEREBROVASCULAR ACCIDENT (CVA) DUE TO OTHER MECHANISM (HCC): Primary | ICD-10-CM

## 2024-09-27 PROCEDURE — 97110 THERAPEUTIC EXERCISES: CPT

## 2024-09-27 PROCEDURE — 97112 NEUROMUSCULAR REEDUCATION: CPT

## 2024-09-27 NOTE — PROGRESS NOTES
"OT Daily Note     Today's date: 2024  Patient name: Mateus Mckeon  : 1985  MRN: [id number]  Referring provider: Dania Sher DO  Dx:   Encounter Diagnosis     ICD-10-CM    1. Cerebrovascular accident (CVA) due to other mechanism (HCC)  I63.89           Start Time: 0800  Stop Time: 0845  Total time in clinic (min): 45 minutes    PLAN OF CARE START: 24  PLAN OF CARE END: 2024  FREQUENCY: 2x/wk  PRECAUTIONS: Renal failure, actively going through dialysis; type 1 diabetes; HTN; SAH; seizure (last one was 2019)  ON FLUID RESTRICTIONS--DO NOT OFFER WATER    Subjective: Pt reports \"my hand gets tired while writing:\"    Objective: See treatment diary below    TA:   -Performed pronation/supination with 3 second holds of red therabar bends focusing on UE strength    NMR:  -Pt completed hand writing task of writing name, unscramble sentences worksheet; educated on different writing utensil of built up, ones with gripper; completed with built up pen of tracing hand writing worksheet of primitive figure trace and letter trace.     Performed itrax task focusing on dexterity, FMC with RUE,  skills, sustained attention   Performed pixy cube task with grid focusing on dexterity, FMC, functional cognition-     Assessment: Pt tolerated session well today. Pt demonstraitng decreased coordination with hand writing tasks and trialed different adaptive equipment with fair effect. Provided information.  Pt would benefit from continued skilled occupational therapy services to improve fine motor coordination, dexterity, stregnth, UE function, and functional cognition.    Plan: Continue per plan of care.       SHORT TERM GOALS ADDED 24:  Pt will increase sustained attention by completing trail making part A in 35 seconds to complete IADLs NOT MET  Pt will increase divided attention by completing trail making part B in 1 minute 30 seconds to complete IADLs NOT MET  Pt will increase delayed recall and " recall 4 /5 items on MOCA to complete IADLs GOAL MET    LONG TERM GOALS ADDED 2/20/24:  Pt will increase sustained attention by completing trail making part A in 38 seconds to complete IADLs  Pt will increase divided attention by completing trail making part B in 1 minute 10 seconds to complete IADLs  Pt will increase delayed recall and recall 5/5 items on MOCA to complete IADLs  Resume right  hand dominance to refined functional assist with all activities of daily living

## 2024-10-01 ENCOUNTER — OFFICE VISIT (OUTPATIENT)
Dept: CARDIOLOGY CLINIC | Facility: CLINIC | Age: 39
End: 2024-10-01
Payer: MEDICARE

## 2024-10-01 VITALS
WEIGHT: 199.2 LBS | OXYGEN SATURATION: 99 % | SYSTOLIC BLOOD PRESSURE: 114 MMHG | DIASTOLIC BLOOD PRESSURE: 66 MMHG | BODY MASS INDEX: 27.89 KG/M2 | HEART RATE: 76 BPM | HEIGHT: 71 IN

## 2024-10-01 DIAGNOSIS — I60.8 NON-ANEURYSMAL PERIMESENCEPHALIC SUBARACHNOID HEMORRHAGE (HCC): ICD-10-CM

## 2024-10-01 DIAGNOSIS — I63.9 CEREBROVASCULAR ACCIDENT (CVA) OF LEFT PONTINE STRUCTURE (HCC): Primary | Chronic | ICD-10-CM

## 2024-10-01 DIAGNOSIS — I25.10 CORONARY ARTERY DISEASE INVOLVING NATIVE CORONARY ARTERY OF NATIVE HEART WITHOUT ANGINA PECTORIS: Chronic | ICD-10-CM

## 2024-10-01 DIAGNOSIS — Z99.2 ESRD ON HEMODIALYSIS (HCC): Chronic | ICD-10-CM

## 2024-10-01 DIAGNOSIS — I15.0 RENOVASCULAR HYPERTENSION: Chronic | ICD-10-CM

## 2024-10-01 DIAGNOSIS — E78.2 MIXED HYPERLIPIDEMIA: Chronic | ICD-10-CM

## 2024-10-01 DIAGNOSIS — N18.6 ESRD ON HEMODIALYSIS (HCC): Chronic | ICD-10-CM

## 2024-10-01 PROCEDURE — 99214 OFFICE O/P EST MOD 30 MIN: CPT | Performed by: INTERNAL MEDICINE

## 2024-10-01 NOTE — PROGRESS NOTES
Cardiology Follow Up    Mateus Casaskyung  1985  [id number]  [facility]  [address]  [place] [address] [phone]    1. Cerebrovascular accident (CVA) of left pontine structure (HCC)        2. Coronary artery disease involving native coronary artery of native heart without angina pectoris        3. Renovascular hypertension        4. 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)        5. ESRD on hemodialysis (HCC)        6. Mixed hyperlipidemia              Discussion/Summary: All of his assessed cardiac problems are stable. I have reviewed his medications and made no changes. No cardiac testing is ordered.  RTO 1 year.      Interval History: He has not had any cardiac problems since his last office visit. He denies CP, SOB, palpitations.  He had a recent SAH.    Cardiac cath 7/2021 - no significant CAD    ECHO 9/19/2024 - Ef 65%, aortic sclerosis    Nuclear stress test 7/9/2024 - no significant ischemia    His weight is down from 206 to 199 lbs.     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)    Dyslipidemia    Strabismus    Environmental and seasonal allergies    Pruritus    Gastroparesis diabeticorum  (HCC)    Multiple pulmonary nodules    Vertigo    Impaired mobility and ADLs    Diabetic neuropathy (HCC)    Provoked seizure (HCC)    Asterixis    Foot drop, bilateral    Secondary hyperparathyroidism (HCC)    ESRD on  hemodialysis (HCC)    Tubulovillous adenoma of colon    Gastroesophageal reflux disease without esophagitis    Medical cannabis use    Generalized anxiety disorder    Hypothyroidism    Coronary artery disease involving native coronary artery of native heart without angina pectoris    Status post placement of implantable loop recorder    RACHEAL (obstructive sleep apnea)    Type 1 diabetes mellitus on insulin therapy (HCC)    Hyperlipidemia    Insomnia    Subarachnoid hemorrhage (HCC)    Cerebellar stroke syndrome    Anemia in chronic kidney disease    Anemia due to chronic kidney disease, on chronic dialysis (HCC)    Cerebrovascular accident (CVA) of left pontine structure (HCC)    Obesity (BMI 30.0-34.9)    Arm paresthesia, right    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)    Swelling of joint of upper arm, right    Moderate episode of recurrent major depressive disorder (HCC)     Past Medical History:   Diagnosis Date    Acute kidney injury (HCC)     Ambulates with cane     Anuria     Anxiety     Cellulitis of right elbow 03/31/2021    Chronic kidney disease     Depression     Diabetes mellitus (HCC)     Diarrhea     Emesis 10/24/2020    End stage renal disease (HCC) 02/11/2018    Formatting of this note might be different from the original. Last Assessment & Plan:  Secondary to DM.  On nightly PD.  Followed by [facility].  [facility] Formatting of this note might be different from the original. Last Assessment & Plan:  Formatting of this note might be different from the original. 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    Esophagitis 07/21/2015    Falls     Gastroparesis     GERD (gastroesophageal reflux disease)     History of shingles 2010    History of transfusion 02/2018    no adverse reaction    Hyperlipidemia     Hyperphosphatemia     Hypertension     Hypoglycemia 07/15/2022    Itching      Mastoiditis of right side 07/15/2022    Muscle weakness     general unsteadiness    Obesity (BMI 30.0-34.9) 2019    Orthostatic hypotension 10/25/2020    Peripheral polyneuropathy 2019    PONV (postoperative nausea and vomiting) 2018    Protein-calorie malnutrition (HCC) 2020    Recurrent peritonitis (HCC) due to peritoneal dialysis catheter 2020    Retinopathy     Seizures (HCC)     early  - one time    Skin abnormality     some dime size areas where skin was scratched from itching    Sleep apnea     Spontaneous bacterial peritonitis (HCC) 10/19/2020    Squamous cell skin cancer     left temple    Stroke (HCC)     x2 - off balance/no driving/fatigue    Swelling of both lower extremities     Traumatic onycholysis 2022    Vomiting     Wears glasses      Social History     Socioeconomic History    Marital status: Single     Spouse name: Not on file    Number of children: Not on file    Years of education: Not on file    Highest education level: Not on file   Occupational History    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   Tobacco Use    Smoking status: Former     Current packs/day: 0.00     Average packs/day: 0.5 packs/day for 12.0 years (6.0 ttl pk-yrs)     Types: Cigarettes     Start date: 2006     Quit date: 2018     Years since quittin.6    Smokeless tobacco: Never    Tobacco comments:     quit 2018   Vaping Use    Vaping status: Every Day    Substances: THC, CBD   Substance and Sexual Activity    Alcohol use: Not Currently    Drug use: Yes     Types: Marijuana     Comment: medical marijuana last vaped 2024    Sexual activity: Not on file   Other Topics Concern    Not on file   Social History Narrative    Caffeine use    single     Social Determinants of Health     Financial Resource Strain: Low Risk  (2023)    Overall Financial Resource Strain (CARDIA)     Difficulty of Paying Living Expenses: Not hard at all   Food Insecurity: No  Food Insecurity (5/17/2024)    Hunger Vital Sign     Worried About Running Out of Food in the Last Year: Never true     Ran Out of Food in the Last Year: Never true   Transportation Needs: No Transportation Needs (5/17/2024)    PRAPARE - Transportation     Lack of Transportation (Medical): No     Lack of Transportation (Non-Medical): No   Physical Activity: Not on file   Stress: Not on file   Social Connections: Unknown (6/18/2024)    Received from [facility]    Social Connections     How often do you feel lonely or isolated from those around you? (Adult - for ages 18 years and over): Not on file   Intimate Partner Violence: Not on file   Housing Stability: Low Risk  (5/17/2024)    Housing Stability Vital Sign     Unable to Pay for Housing in the Last Year: No     Number of Times Moved in the Last Year: 0     Homeless in the Last Year: No      Family History   Problem Relation Age of Onset    Breast cancer Mother     Hypertension Mother     Hyperlipidemia Father     Hypertension Father     Leukemia Maternal Grandmother     Hyperlipidemia Maternal Grandfather     Hypertension Maternal Grandfather     Hyperlipidemia Paternal Grandmother     Hypertension Paternal Grandmother     Heart disease Paternal Grandfather         cardiac disorder    Diabetes Paternal Grandfather      Past Surgical History:   Procedure Laterality Date    CARDIAC ELECTROPHYSIOLOGY PROCEDURE N/A 9/21/2023    Procedure: Cardiac loop recorder explant;  Surgeon: Parish Morgan MD;  Location: [facility];  Service: Cardiology    CARDIAC LOOP RECORDER  05/2018    COLONOSCOPY      EGD      EYE SURGERY Right     IR AV FISTULAGRAM/GRAFTOGRAM  02/23/2021    IR CEREBRAL ANGIOGRAPHY  1/12/2024    IR CEREBRAL ANGIOGRAPHY / INTERVENTION  1/5/2024    IR TUNNELED CENTRAL LINE PLACEMENT  02/16/2021    IR TUNNELED DIALYSIS CATHETER PLACEMENT  11/18/2020    IR TUNNELED DIALYSIS CATHETER REMOVAL  02/12/2021    IR TUNNELED DIALYSIS CATHETER REMOVAL   03/11/2021    MOHS SURGERY Left 12/14/2022    Left temple with Dr. Hassan    PERITONEAL CATHETER INSERTION N/A 08/27/2018    Procedure: UNROOF PD CATHETER;  Surgeon: Felipe Lindo DO;  Location: [facility];  Service: General    ME ARTERIOVENOUS ANASTOMOSIS OPEN DIRECT Left 11/09/2020    Procedure: CREATION FISTULA  ARTERIOVENOUS (AV) - LEFT WRIST;  Surgeon: Placido Altamirano MD;  Location: [facility];  Service: Vascular    ME ESOPHAGOGASTRODUODENOSCOPY TRANSORAL DIAGNOSTIC N/A 04/18/2019    Procedure: ESOPHAGOGASTRODUODENOSCOPY (EGD);  Surgeon: Ale Figueroa MD;  Location: [facility];  Service: Gastroenterology    ME LAPS INSERTION TUNNELED INTRAPERITONEAL CATHETER N/A 08/06/2018    Procedure: LAPAROSCOPIC PD CATHETER PLACEMENT;  Surgeon: Felipe Lindo DO;  Location[facility];  Service: General    ME REMOVAL TUNNELED INTRAPERITONEAL CATHETER N/A 11/18/2020    Procedure: REMOVAL CATHETER PERITONEAL DIALYSIS;  Surgeon: Abdifatah Ty MD;  Location: [facility];  Service: General    TONSILLECTOMY      UPPER GASTROINTESTINAL ENDOSCOPY         Current Outpatient Medications:     aspirin (ECOTRIN LOW STRENGTH) 81 mg EC tablet, Take 81 mg by mouth daily, Disp: , Rfl:     atorvastatin (LIPITOR) 40 mg tablet, TAKE 1 TABLET BY MOUTH ONCE DAILY IN THE EVENING, Disp: 90 tablet, Rfl: 1    b complex vitamins capsule, Take 1 capsule by mouth daily before lunch , Disp: , Rfl:     calcium acetate (PHOSLO) capsule, Take 1 capsule (667 mg total) by mouth 3 (three) times a day, Disp: 90 capsule, Rfl: 0    cinacalcet (SENSIPAR) 60 MG tablet, Take 1 tablet (60 mg total) by mouth daily, Disp: 30 tablet, Rfl: 0    escitalopram (LEXAPRO) 20 mg tablet, Take 1 tablet (20 mg total) by mouth daily, Disp: 90 tablet, Rfl: 2    famotidine (PEPCID) 40 MG tablet, TAKE 1 TABLET BY MOUTH IN THE MORNING, Disp: 90 tablet, Rfl: 1    gabapentin (NEURONTIN) 100 mg capsule, TAKE 1 CAPSULE BY MOUTH IN THE MORNING AND 2 CAPSULES AT BEDTIME., Disp: 90 capsule,  Rfl: 3    GLUCAGON EMERGENCY 1 MG injection, , Disp: , Rfl: 3    Gvoke HypoPen 1-Pack 1 MG/0.2ML SOAJ, as needed, Disp: , Rfl:     hydrOXYzine HCL (ATARAX) 10 mg tablet, Take 1 tablet (10 mg total) by mouth every 6 (six) hours as needed for itching or anxiety, Disp: 30 tablet, Rfl: 3    Insulin Disposable Pump (Omnipod 5 G6 Intro, Gen 5,) KIT, , Disp: , Rfl:     Insulin Disposable Pump (Omnipod 5 G6 Pod, Gen 5,) MISC, , Disp: , Rfl:     labetalol (NORMODYNE) 200 mg tablet, Take 2 tablets (400 mg total) by mouth 3 (three) times a day (Patient taking differently: Take 400 mg by mouth 3 (three) times a day 200mg in AM, 400mg at lunch and dinner), Disp: 180 tablet, Rfl: 0    metoclopramide (REGLAN) 5 mg tablet, Take 1 tablet by mouth three times daily as needed, Disp: 90 tablet, Rfl: 0    mupirocin (BACTROBAN) 2 % ointment, Apply topically 2 (two) times a day, Disp: 22 g, Rfl: 0    NIFEdipine (PROCARDIA XL) 90 mg 24 hr tablet, Take 90 mg by mouth daily, Disp: , Rfl:     olmesartan (BENICAR) 40 mg tablet, Take 40 mg by mouth daily, Disp: , Rfl:     ondansetron (ZOFRAN) 4 mg tablet, Take 1 tablet (4 mg total) by mouth every 8 (eight) hours as needed for nausea or vomiting, Disp: 90 tablet, Rfl: 1    Patiromer Sorbitex Calcium (VELTASSA PO), Take 5 g by mouth once a week, Disp: , Rfl:     SPS 15 GM/60ML suspension, TAKE 60 ML BY MOUTH SINGLE DOSE FOR EMERGENCY USE DURING MISSED HEMODIALYSIS, Disp: , Rfl:     traZODone (DESYREL) 50 mg tablet, TAKE 1/2 (ONE-HALF) TABLET BY MOUTH ONCE DAILY AT BEDTIME AS NEEDED FOR SLEEP, Disp: 30 tablet, Rfl: 2    NovoLOG 100 UNIT/ML injection, , Disp: , Rfl:     saccharomyces boulardii (FLORASTOR) 250 mg capsule, Take 250 mg by mouth daily (Patient not taking: Reported on 10/1/2024), Disp: , Rfl:   Allergies   Allergen Reactions    Sulfa Antibiotics Rash     Vitals:    10/01/24 0928   BP: 114/66   BP Location: Right arm   Patient Position: Sitting   Cuff Size: Standard   Pulse: 76   SpO2:  "99%   Weight: 90.4 kg (199 lb 3.2 oz)   Height: 5' 11\" (1.803 m)     Weight (last 2 days)       Date/Time Weight    10/01/24 0928 90.4 (199.2)           Blood pressure 114/66, pulse 76, height 5' 11\" (1.803 m), weight 90.4 kg (199 lb 3.2 oz), SpO2 99%., Body mass index is 27.78 kg/m².    Labs:  Appointment on 07/30/2024   Component Date Value    TSH 3RD GENERATON 07/30/2024 3.088    Appointment on 07/16/2024   Component Date Value    WBC 07/16/2024 6.28     RBC 07/16/2024 4.17     Hemoglobin 07/16/2024 13.2     Hematocrit 07/16/2024 39.8     MCV 07/16/2024 95     MCH 07/16/2024 31.7     MCHC 07/16/2024 33.2     RDW 07/16/2024 13.4     MPV 07/16/2024 10.3     Platelets 07/16/2024 244     nRBC 07/16/2024 0     Segmented % 07/16/2024 66     Immature Grans % 07/16/2024 0     Lymphocytes % 07/16/2024 20     Monocytes % 07/16/2024 5     Eosinophils Relative 07/16/2024 8 (H)     Basophils Relative 07/16/2024 1     Absolute Neutrophils 07/16/2024 4.09     Absolute Immature Grans 07/16/2024 0.02     Absolute Lymphocytes 07/16/2024 1.28     Absolute Monocytes 07/16/2024 0.32     Eosinophils Absolute 07/16/2024 0.48     Basophils Absolute 07/16/2024 0.09     Sodium 07/16/2024 139     Potassium 07/16/2024 4.9     Chloride 07/16/2024 94 (L)     CO2 07/16/2024 37 (H)     ANION GAP 07/16/2024 8     BUN 07/16/2024 22     Creatinine 07/16/2024 7.21 (H)     Glucose, Fasting 07/16/2024 155 (H)     Calcium 07/16/2024 9.2     AST 07/16/2024 20     ALT 07/16/2024 13     Alkaline Phosphatase 07/16/2024 104     Total Protein 07/16/2024 7.2     Albumin 07/16/2024 4.6     Total Bilirubin 07/16/2024 0.66     eGFR 07/16/2024 8     C-Peptide 07/16/2024 <0.1 (L)     Hemoglobin A1C 07/16/2024 6.2 (H)     EAG 07/16/2024 131     Hep A Total Ab 07/16/2024 Non-reactive     Hep B Core Total Ab 07/16/2024 Non-reactive     Hepatitis B Surface Ag 07/16/2024 Non-reactive     Hep B S Ab 07/16/2024 >500.00     HEP C AB 07/16/2024 Non Reactive     HIV-1 p24 " Antigen 07/16/2024 Non-Reactive     HIV-1 Antibody 07/16/2024 Non-Reactive     HIV-2 Antibody 07/16/2024 Non-Reactive     HIV Ag-Ab 5th Gen 07/16/2024 Non-Reactive     Cholesterol 07/16/2024 111     Triglycerides 07/16/2024 64     HDL, Direct 07/16/2024 42     LDL Calculated 07/16/2024 56     Non-HDL-Chol (CHOL-HDL) 07/16/2024 69     Magnesium 07/16/2024 2.7     Rubeola IgG 07/16/2024 IMMUNE     Mumps IgG 07/16/2024 NON-IMMUNE (A)     Phosphorus 07/16/2024 4.9 (H)     Protime 07/16/2024 13.4     INR 07/16/2024 0.97     PTT 07/16/2024 35     Rubella IgG Quant 07/16/2024 69.5     Syphilis Total Antibody 07/16/2024 Non-reactive     AFP TUMOR MARKER 07/16/2024 1.85     Varicella IgG 07/16/2024 IMMUNE     Antinuclear Antibodies, * 07/16/2024 Negative     AntiThrombIN III Activity 07/16/2024 89 (L)     PROTEIN S ACTIVITY 07/16/2024 119 (H)     PSA 07/16/2024 0.252     CMV IGG 07/16/2024 6.30 (H)     CMV IgM 07/16/2024 <30.0     EBV Early Antigen Ab, IgG 07/16/2024 <9.0     ANTICARDIOLIPIN IGG ANTI* 07/16/2024 2.2     ANTICARDIOLIPIN IGA ANTI* 07/16/2024 6.7     ANTICARDIOLIPIN IGM ANTI* 07/16/2024 2.5     BETA 2 GLYCO 1 IGG 07/16/2024 1.0     BETA 2 GLYCO 1 IGA 07/16/2024 3.6     BETA 2 GLYCO 1 IGM 07/16/2024 <2.4     Protein C Activity 07/16/2024 94.0     Amphetamine 07/16/2024 Negative     Barbiturates 07/16/2024 Negative     Benzodiazepines 07/16/2024 Negative     Cannabinoids 07/16/2024 ++POSITIVE++ (A)     Cocaine Bld, Conf 07/16/2024 Negative     Opiates 07/16/2024 Negative     PCP 07/16/2024 Negative     Oxycodone 07/16/2024 Negative     Miscellaneous Lab Test R* 07/16/2024 SEE WRITTEN REPORT     QFT Nil 07/16/2024 0.05     QFT TB1-NIL 07/16/2024 0.00     QFT TB2-NIL 07/16/2024 0.00     QFT Mitogen-NIL 07/16/2024 9.95     QFT Final Interpretation 07/16/2024 Negative     Interpretation: 07/16/2024 Comment     THC Bld, Conf 07/16/2024 47.9     Hydroxy-THC 07/16/2024 7.3     Cannabidiol 07/16/2024 Negative     Carboxy  THC 07/16/2024 195.4     Cannabinoid Confirmation 07/16/2024 Positive    Hospital Outpatient Visit on 07/09/2024   Component Date Value    Protocol Name 07/09/2024 NING SIT     Exercise duration (min) 07/09/2024 3     Exercise duration (sec) 07/09/2024 0     Post Peak Systolic BP 07/09/2024 144     Max Diastolic Bp 07/09/2024 96     Peak HR 07/09/2024 95     Max Predicted Heart Rate 07/09/2024 180     Reason for Termination 07/09/2024 TEST COMPLETE...     Test Indication 07/09/2024 kidney transplant list, CAD     Target Hr Formular 07/09/2024 (220 - Age)*100%     Chest Pain Statement 07/09/2024 none    Hospital Outpatient Visit on 07/02/2024   Component Date Value    Baseline HR 07/09/2024 69     Baseline BP 07/09/2024 144/96     O2 sat rest 07/09/2024 98     Stress peak HR 07/09/2024 95     Post peak BP 07/09/2024 100     O2 sat peak 07/09/2024 93     Recovery HR 07/09/2024 72     Recovery BP 07/09/2024 150/96     O2 sat recovery 07/09/2024 98     Max HR 07/09/2024 95     Max HR Percent 07/09/2024 52     Estimated workload 07/09/2024 1.0     Rate Pressure Product 07/09/2024 9,500.0     Angina Index 07/09/2024 0     Rest Nuclear Isotope Dose 07/09/2024 10.71     Stress Nuclear Isotope D* 07/09/2024 30.20     EF (%) 07/09/2024 52      Imaging: Transthoracic Echo Complete    Result Date: 9/19/2024  Narrative: This result has an attachment that is not available.   Left Ventricle: Vigorous systolic function. The EF is in the range of 65 - 70% and an estimated ejection fraction of 65%.   Aortic Valve: Valve is trileaflet. Mildly calcified. Aortic sclerosis without hemodynamically significant stenosis.   Mitral Valve: Mild posterior mitral annular calcification.   Right Ventricle: Normal systolic function. TAPSE is 24 mm.   Pulmonic Valve: TR jet was inadequate for estimated PA systolic pressure. Left Ventricle Left ventricle size is normal. Left ventricular mass index is normal. Normal wall thickness. Normal wall motion.  Vigorous systolic function. The EF is in the range of 65 - 70% and an estimated ejection fraction of 65%. Normal diastolic function. Right Ventricle Right ventricle size is normal. Normal systolic function. TAPSE is 24 mm. Left Atrium Left atrium size is normal. Left atrial volume index is 27 ml/m2. Right Atrium Right atrium size is normal. There is a prominent Eustachian valve. IVC/SVC IVC diameter is less than or equal to 21 mm and decreases greater than 50% during inspiration; therefore the estimated right atrial pressure is normal (~3 mmHg). Mitral Valve Mild posterior mitral annular calcification. No restricted motion. No transvalvular regurgitation. No stenosis. Tricuspid Valve Normal tricuspid valve structure. Normal motion. No transvalvular regurgitation. No stenosis. Aortic Valve Valve is trileaflet. Mildly calcified. No reduced motion. Transaortic velocity was minimally increased. No transvalvular regurgitation. Aortic sclerosis without hemodynamically significant stenosis. Pulmonic Valve Valve structure is normal. No significant transvalvular regurgitation. No stenosis. TR jet was inadequate for estimated PA systolic pressure. Ascending Aorta Normal sized aortic root. No coarctation. Pericardium Trivial pericardial effusion present without hemodynamic significance. Interatrial Septum Interatrial septum appears normal without PFO or ASD by color Doppler. Study Details The procedure was performed in the Outpatient Echo Lab. This was a routine study. A complete 2D, color flow Doppler and spectral Doppler echocardiogram was performed. Images were obtained from the parasternal, apical, subcostal and suprasternal notch views. Image quality was technically difficult. Technical difficulties due to limited windows. Prior Study Comparison was made from a prior TTE study from 9/7/2023. There has been no significant interval change.    XR sacrum and coccyx    Result Date: 9/12/2024  Narrative: SACRUM AND COCCYX  INDICATION:   Unspecified fall, initial encounter. Sacrococcygeal disorders, not elsewhere classified. COMPARISON:  None. VIEWS:  XR SACRUM AND COCCYX Images: 3  FINDINGS: There is no evidence of an acute fracture. Sacral arcuate lines are maintained. The sacroiliac joints appear symmetric. Pubic symphysis is maintained. Visualized lower lumbar spine is unremarkable.     Impression: No acute osseous abnormality. Electronically signed: 09/12/2024 09:57 AM Miguelangel Bourgeois MPH,MD    Review of Systems:  Review of Systems   Constitutional:  Negative for diaphoresis, fatigue, fever and unexpected weight change.   HENT: Negative.     Respiratory:  Negative for cough, shortness of breath and wheezing.    Cardiovascular:  Negative for chest pain, palpitations and leg swelling.   Gastrointestinal:  Negative for abdominal pain, diarrhea and nausea.   Musculoskeletal:  Negative for gait problem and myalgias.   Skin:  Negative for rash.   Neurological:  Negative for dizziness and numbness.   Psychiatric/Behavioral: Negative.         Physical Exam:  Physical Exam  Constitutional:       Appearance: He is well-developed.   HENT:      Head: Normocephalic and atraumatic.   Eyes:      Pupils: Pupils are equal, round, and reactive to light.   Neck:      Vascular: No JVD.   Cardiovascular:      Rate and Rhythm: Regular rhythm.      Pulses: Normal pulses.           Carotid pulses are 2+ on the right side and 2+ on the left side.     Heart sounds: S1 normal and S2 normal.   Pulmonary:      Effort: Pulmonary effort is normal.      Breath sounds: Normal breath sounds. No wheezing or rales.   Abdominal:      General: Bowel sounds are normal.      Palpations: Abdomen is soft.   Musculoskeletal:         General: No tenderness. Normal range of motion.      Cervical back: Normal range of motion and neck supple.   Skin:     General: Skin is warm.   Neurological:      Mental Status: He is alert and oriented to person, place, and time.      Cranial  Nerves: No cranial nerve deficit.      Deep Tendon Reflexes: Reflexes are normal and symmetric.

## 2024-10-02 ENCOUNTER — OFFICE VISIT (OUTPATIENT)
Facility: CLINIC | Age: 39
End: 2024-10-02
Payer: MEDICARE

## 2024-10-02 DIAGNOSIS — I63.89 CEREBROVASCULAR ACCIDENT (CVA) DUE TO OTHER MECHANISM (HCC): Primary | ICD-10-CM

## 2024-10-02 PROCEDURE — 97112 NEUROMUSCULAR REEDUCATION: CPT

## 2024-10-02 NOTE — PROGRESS NOTES
"OT Daily Note     Today's date: 10/2/2024  Patient name: Mateus Mckeon  : 1985  MRN: [id number]  Referring provider: Dania Sher DO  Dx:   Encounter Diagnosis     ICD-10-CM    1. Cerebrovascular accident (CVA) due to other mechanism (HCC)  I63.89             Start Time: 0800  Stop Time: 0845  Total time in clinic (min): 45 minutes    PLAN OF CARE START: 24  PLAN OF CARE END: 2024  FREQUENCY: 2x/wk  PRECAUTIONS: Renal failure, actively going through dialysis; type 1 diabetes; HTN; SAH; seizure (last one was 2019)  ON FLUID RESTRICTIONS--DO NOT OFFER WATER    Subjective: \"This is good because I have to take breaks from writing\"    Objective: See treatment diary below      NMR:  -Multimatrix transfer of cubes in figural order with use of red resistive pinch pin with use of visual closure sequence.  Pt then writes a food that begins with each letter.  Focus on handwriting, pincer vs 3 point pinch strength divided attn, sustained attn, . Requires inc time for all aspects.  Legibility initially ~80% but declines to 60% with fatigue.    Assessment: Pt tolerated session well today. Pt demonstrating decreased coordination and endurance of hand musculature. Pt would benefit from continued skilled occupational therapy services to improve fine motor coordination, dexterity, strength, UE function, and functional cognition.    Plan: Continue per plan of care.       SHORT TERM GOALS ADDED 24:  Pt will increase sustained attention by completing trail making part A in 35 seconds to complete IADLs NOT MET  Pt will increase divided attention by completing trail making part B in 1 minute 30 seconds to complete IADLs NOT MET  Pt will increase delayed recall and recall 4 /5 items on MOCA to complete IADLs GOAL MET    LONG TERM GOALS ADDED 24:  Pt will increase sustained attention by completing trail making part A in 38 seconds to complete IADLs  Pt will increase divided attention by completing " trail making part B in 1 minute 10 seconds to complete IADLs  Pt will increase delayed recall and recall 5/5 items on MOCA to complete IADLs  Resume right  hand dominance to refined functional assist with all activities of daily living

## 2024-10-05 DIAGNOSIS — F33.1 MODERATE EPISODE OF RECURRENT MAJOR DEPRESSIVE DISORDER (HCC): ICD-10-CM

## 2024-10-07 DIAGNOSIS — F41.1 GENERALIZED ANXIETY DISORDER: ICD-10-CM

## 2024-10-07 DIAGNOSIS — F33.0 MILD EPISODE OF RECURRENT MAJOR DEPRESSIVE DISORDER (HCC): ICD-10-CM

## 2024-10-07 RX ORDER — ESCITALOPRAM OXALATE 20 MG/1
20 TABLET ORAL DAILY
Qty: 90 TABLET | Refills: 0 | Status: SHIPPED | OUTPATIENT
Start: 2024-10-07

## 2024-10-07 RX ORDER — TRAZODONE HYDROCHLORIDE 50 MG/1
TABLET, FILM COATED ORAL
Qty: 30 TABLET | Refills: 0 | Status: SHIPPED | OUTPATIENT
Start: 2024-10-07

## 2024-10-08 ENCOUNTER — OFFICE VISIT (OUTPATIENT)
Facility: CLINIC | Age: 39
End: 2024-10-08
Payer: MEDICARE

## 2024-10-08 DIAGNOSIS — I63.89 CEREBROVASCULAR ACCIDENT (CVA) DUE TO OTHER MECHANISM (HCC): Primary | ICD-10-CM

## 2024-10-08 PROCEDURE — 97110 THERAPEUTIC EXERCISES: CPT

## 2024-10-08 PROCEDURE — 97112 NEUROMUSCULAR REEDUCATION: CPT

## 2024-10-08 NOTE — PROGRESS NOTES
"OT Daily Note     Today's date: 10/8/2024  Patient name: Mateus Mckeon  : 1985  MRN: [id number]  Referring provider: Dania Sher DO  Dx:   Encounter Diagnosis     ICD-10-CM    1. Cerebrovascular accident (CVA) due to other mechanism (HCC)  I63.89               Start Time: 0930  Stop Time: 1015  Total time in clinic (min): 45 minutes    PLAN OF CARE START: 24  PLAN OF CARE END: 2024  FREQUENCY: 2x/wk  PRECAUTIONS: Renal failure, actively going through dialysis; type 1 diabetes; HTN; SAH; seizure (last one was 2019)  ON FLUID RESTRICTIONS--DO NOT OFFER WATER    Subjective: \"This is good because I have to take breaks from writing\"    Objective: See treatment diary below      TE: Completed the following for wrist/hand strength and motor control  -Completed gripping with 35 lbs resistance for ~4 minutes  -Pronation/supination for 3 minutes with pt holding hammer for resistance.   -Ulnar/radial deviation for 3 minutes with pt holding hammer for resistance.     NMR:  -Rubber bands on cylinder exercise with put suing finger extension to place yellow, red, and green bands on. Focus on on FMC, dexterity, motor control.   -Started beading activity to work on bilateral coordination, FMC, dexterity, however pt requested to work on strength more after ~5 minutes.   -Placing black and blue clothes pin onto clothes pin rack to work on pinch strength, FMC, UE coordination.     Assessment: Pt tolerated session well today. Pt demonstrating decreased coordination and endurance of hand musculature. Pt would benefit from continued skilled occupational therapy services to improve fine motor coordination, dexterity, strength, UE function, and functional cognition.    Plan: Continue per plan of care.       SHORT TERM GOALS ADDED 24:  Pt will increase sustained attention by completing trail making part A in 35 seconds to complete IADLs NOT MET  Pt will increase divided attention by completing trail " making part B in 1 minute 30 seconds to complete IADLs NOT MET  Pt will increase delayed recall and recall 4 /5 items on MOCA to complete IADLs GOAL MET    LONG TERM GOALS ADDED 2/20/24:  Pt will increase sustained attention by completing trail making part A in 38 seconds to complete IADLs  Pt will increase divided attention by completing trail making part B in 1 minute 10 seconds to complete IADLs  Pt will increase delayed recall and recall 5/5 items on MOCA to complete IADLs  Resume right  hand dominance to refined functional assist with all activities of daily living

## 2024-10-10 ENCOUNTER — OFFICE VISIT (OUTPATIENT)
Facility: CLINIC | Age: 39
End: 2024-10-10
Payer: MEDICARE

## 2024-10-10 DIAGNOSIS — I63.89 CEREBROVASCULAR ACCIDENT (CVA) DUE TO OTHER MECHANISM (HCC): Primary | ICD-10-CM

## 2024-10-10 PROCEDURE — 97530 THERAPEUTIC ACTIVITIES: CPT

## 2024-10-10 PROCEDURE — 97112 NEUROMUSCULAR REEDUCATION: CPT

## 2024-10-10 PROCEDURE — 97110 THERAPEUTIC EXERCISES: CPT

## 2024-10-10 NOTE — PROGRESS NOTES
"OT Daily Note     Today's date: 10/10/2024  Patient name: Mateus Mckeon  : 1985  MRN: [id number]  Referring provider: Dania Sher DO  Dx:   Encounter Diagnosis     ICD-10-CM    1. Cerebrovascular accident (CVA) due to other mechanism (HCC)  I63.89               Start Time: 0804  Stop Time: 0845  Total time in clinic (min): 41 minutes    PLAN OF CARE START: 24  PLAN OF CARE END: 2024  FREQUENCY: 2x/wk  PRECAUTIONS: Renal failure, actively going through dialysis; type 1 diabetes; HTN; SAH; seizure (last one was 2019)  ON FLUID RESTRICTIONS--DO NOT OFFER WATER    Subjective: \"This was a good workout for my arm\"    Objective: See treatment diary below      TE: Completed the following for wrist/hand strength and motor control  -Biceps curl with overhead press using 4 lb dumb bell 3x10 sets  -Pronation/supination for 30x reps with pt holding hammer for resistance.   -Ulnar/radial deviation for 3 minutes with pt holding hammer for resistance.     NMR:  -In seated bouncing lacrosse ball on floor and catching with R hand to work on graded motor control, UE coordination, hand eye coordination, grasp/release. Pt ~50% successful with catching.     TA:   Picking up colored plastic bears using blue clothes pin the the R hand and placing into matching cups. Focus on pinch/hand strength, functional reaching.     Assessment: Pt tolerated session well today. Required frequent rest breaks due to fatigue in the R UE with strength exercises today. Pt would benefit from continued skilled occupational therapy services to improve fine motor coordination, dexterity, strength, UE function, and functional cognition.    Plan: Continue per plan of care.       SHORT TERM GOALS ADDED 24:  Pt will increase sustained attention by completing trail making part A in 35 seconds to complete IADLs NOT MET  Pt will increase divided attention by completing trail making part B in 1 minute 30 seconds to complete IADLs " NOT MET  Pt will increase delayed recall and recall 4 /5 items on MOCA to complete IADLs GOAL MET    LONG TERM GOALS ADDED 2/20/24:  Pt will increase sustained attention by completing trail making part A in 38 seconds to complete IADLs  Pt will increase divided attention by completing trail making part B in 1 minute 10 seconds to complete IADLs  Pt will increase delayed recall and recall 5/5 items on MOCA to complete IADLs  Resume right  hand dominance to refined functional assist with all activities of daily living

## 2024-10-15 ENCOUNTER — OFFICE VISIT (OUTPATIENT)
Facility: CLINIC | Age: 39
End: 2024-10-15
Payer: MEDICARE

## 2024-10-15 DIAGNOSIS — I63.89 CEREBROVASCULAR ACCIDENT (CVA) DUE TO OTHER MECHANISM (HCC): Primary | ICD-10-CM

## 2024-10-15 PROCEDURE — 97112 NEUROMUSCULAR REEDUCATION: CPT

## 2024-10-15 PROCEDURE — 97110 THERAPEUTIC EXERCISES: CPT

## 2024-10-15 NOTE — PROGRESS NOTES
"OT Daily Note     Today's date: 10/15/2024  Patient name: Mateus Mckeon  : 1985  MRN: [id number]  Referring provider: Dania Sher DO  Dx:   Encounter Diagnosis     ICD-10-CM    1. Cerebrovascular accident (CVA) due to other mechanism (HCC)  I63.89               Start Time: 1015  Stop Time: 1100  Total time in clinic (min): 45 minutes    PLAN OF CARE START: 24  PLAN OF CARE END: 2024  FREQUENCY: 2x/wk  PRECAUTIONS: Renal failure, actively going through dialysis; type 1 diabetes; HTN; SAH; seizure (last one was 2019)  ON FLUID RESTRICTIONS--DO NOT OFFER WATER    Subjective: \"This was a good workout for my arm\"    Objective: See treatment diary below    TE: Completed the following for wrist/hand strength and motor control  -Biceps curl with overhead press using 4 lb dumb bell 3x10 sets  -Squeezing gripper with 25 lbs resistance with shoulder at 90 degrees flexion and elbow in full extension, 3 second isometric holds. 3 sets performed to fatigue.     NMR:  -Dexterciser performed 10x repetitions for UE control and endurance.   -Picking up marbles with green clothes pin to work on hand strength, fine motor control, graded release.   - marbles using plastic spoon to work on fine graded control, functional endurance with the R UE.     Assessment: Pt tolerated session well today. Required frequent rest breaks due to fatigue in the R UE with strength exercises today. Pt would benefit from continued skilled occupational therapy services to improve fine motor coordination, dexterity, strength, UE function, and functional cognition.    Plan: Continue per plan of care.       SHORT TERM GOALS ADDED 24:  Pt will increase sustained attention by completing trail making part A in 35 seconds to complete IADLs NOT MET  Pt will increase divided attention by completing trail making part B in 1 minute 30 seconds to complete IADLs NOT MET  Pt will increase delayed recall and recall 4 /5 items on " MOCA to complete IADLs GOAL MET    LONG TERM GOALS ADDED 2/20/24:  Pt will increase sustained attention by completing trail making part A in 38 seconds to complete IADLs  Pt will increase divided attention by completing trail making part B in 1 minute 10 seconds to complete IADLs  Pt will increase delayed recall and recall 5/5 items on MOCA to complete IADLs  Resume right  hand dominance to refined functional assist with all activities of daily living]

## 2024-10-18 ENCOUNTER — HOSPITAL ENCOUNTER (OUTPATIENT)
Facility: HOSPITAL | Age: 39
Setting detail: OBSERVATION
Discharge: HOME | End: 2024-10-19
Attending: EMERGENCY MEDICINE
Payer: MEDICARE

## 2024-10-18 ENCOUNTER — APPOINTMENT (OUTPATIENT)
Facility: CLINIC | Age: 39
End: 2024-10-18
Payer: MEDICARE

## 2024-10-18 ENCOUNTER — APPOINTMENT (EMERGENCY)
Dept: RADIOLOGY | Facility: HOSPITAL | Age: 39
End: 2024-10-18
Payer: MEDICARE

## 2024-10-18 DIAGNOSIS — U07.1 COVID-19 VIRUS INFECTION: Primary | ICD-10-CM

## 2024-10-18 DIAGNOSIS — I10 ESSENTIAL (PRIMARY) HYPERTENSION: ICD-10-CM

## 2024-10-18 DIAGNOSIS — Z99.2 DIALYSIS PATIENT (HCC): ICD-10-CM

## 2024-10-18 PROBLEM — N25.81 SECONDARY HYPERPARATHYROIDISM OF RENAL ORIGIN (HCC): Status: ACTIVE | Noted: 2024-10-18

## 2024-10-18 LAB
ALBUMIN SERPL BCG-MCNC: 4.3 G/DL (ref 3.5–5)
ALP SERPL-CCNC: 131 U/L (ref 34–104)
ALT SERPL W P-5'-P-CCNC: 12 U/L (ref 7–52)
ANION GAP SERPL CALCULATED.3IONS-SCNC: 11 MMOL/L (ref 4–13)
APTT PPP: 35 SECONDS (ref 23–34)
AST SERPL W P-5'-P-CCNC: 19 U/L (ref 13–39)
BASOPHILS # BLD AUTO: 0.05 THOUSANDS/ΜL (ref 0–0.1)
BASOPHILS NFR BLD AUTO: 1 % (ref 0–1)
BILIRUB SERPL-MCNC: 0.53 MG/DL (ref 0.2–1)
BUN SERPL-MCNC: 35 MG/DL (ref 5–25)
CALCIUM SERPL-MCNC: 8.8 MG/DL (ref 8.4–10.2)
CHLORIDE SERPL-SCNC: 95 MMOL/L (ref 96–108)
CO2 SERPL-SCNC: 29 MMOL/L (ref 21–32)
CREAT SERPL-MCNC: 11.51 MG/DL (ref 0.6–1.3)
EOSINOPHIL # BLD AUTO: 0.08 THOUSAND/ΜL (ref 0–0.61)
EOSINOPHIL NFR BLD AUTO: 2 % (ref 0–6)
ERYTHROCYTE [DISTWIDTH] IN BLOOD BY AUTOMATED COUNT: 13.8 % (ref 11.6–15.1)
FLUAV AG UPPER RESP QL IA.RAPID: NEGATIVE
FLUBV AG UPPER RESP QL IA.RAPID: NEGATIVE
GFR SERPL CREATININE-BSD FRML MDRD: 4 ML/MIN/1.73SQ M
GLUCOSE SERPL-MCNC: 131 MG/DL (ref 65–140)
GLUCOSE SERPL-MCNC: 145 MG/DL (ref 65–140)
HCT VFR BLD AUTO: 40.3 % (ref 36.5–49.3)
HGB BLD-MCNC: 13.3 G/DL (ref 12–17)
IMM GRANULOCYTES # BLD AUTO: 0.01 THOUSAND/UL (ref 0–0.2)
IMM GRANULOCYTES NFR BLD AUTO: 0 % (ref 0–2)
INR PPP: 1.03 (ref 0.85–1.19)
LACTATE SERPL-SCNC: 1.2 MMOL/L (ref 0.5–2)
LYMPHOCYTES # BLD AUTO: 0.72 THOUSANDS/ΜL (ref 0.6–4.47)
LYMPHOCYTES NFR BLD AUTO: 16 % (ref 14–44)
MCH RBC QN AUTO: 33.4 PG (ref 26.8–34.3)
MCHC RBC AUTO-ENTMCNC: 33 G/DL (ref 31.4–37.4)
MCV RBC AUTO: 101 FL (ref 82–98)
MONOCYTES # BLD AUTO: 0.48 THOUSAND/ΜL (ref 0.17–1.22)
MONOCYTES NFR BLD AUTO: 10 % (ref 4–12)
NEUTROPHILS # BLD AUTO: 3.29 THOUSANDS/ΜL (ref 1.85–7.62)
NEUTS SEG NFR BLD AUTO: 71 % (ref 43–75)
NRBC BLD AUTO-RTO: 0 /100 WBCS
PLATELET # BLD AUTO: 188 THOUSANDS/UL (ref 149–390)
PMV BLD AUTO: 10.7 FL (ref 8.9–12.7)
POTASSIUM SERPL-SCNC: 4.7 MMOL/L (ref 3.5–5.3)
PROCALCITONIN SERPL-MCNC: 0.73 NG/ML
PROT SERPL-MCNC: 6.9 G/DL (ref 6.4–8.4)
PROTHROMBIN TIME: 14.2 SECONDS (ref 12.3–15)
RBC # BLD AUTO: 3.98 MILLION/UL (ref 3.88–5.62)
SARS-COV+SARS-COV-2 AG RESP QL IA.RAPID: POSITIVE
SODIUM SERPL-SCNC: 135 MMOL/L (ref 135–147)
WBC # BLD AUTO: 4.63 THOUSAND/UL (ref 4.31–10.16)

## 2024-10-18 PROCEDURE — 85730 THROMBOPLASTIN TIME PARTIAL: CPT

## 2024-10-18 PROCEDURE — 80053 COMPREHEN METABOLIC PANEL: CPT

## 2024-10-18 PROCEDURE — 87804 INFLUENZA ASSAY W/OPTIC: CPT

## 2024-10-18 PROCEDURE — 87040 BLOOD CULTURE FOR BACTERIA: CPT

## 2024-10-18 PROCEDURE — 36415 COLL VENOUS BLD VENIPUNCTURE: CPT

## 2024-10-18 PROCEDURE — 84145 PROCALCITONIN (PCT): CPT

## 2024-10-18 PROCEDURE — 82948 REAGENT STRIP/BLOOD GLUCOSE: CPT

## 2024-10-18 PROCEDURE — 85025 COMPLETE CBC W/AUTO DIFF WBC: CPT

## 2024-10-18 PROCEDURE — 99285 EMERGENCY DEPT VISIT HI MDM: CPT

## 2024-10-18 PROCEDURE — 93005 ELECTROCARDIOGRAM TRACING: CPT

## 2024-10-18 PROCEDURE — 83605 ASSAY OF LACTIC ACID: CPT

## 2024-10-18 PROCEDURE — 99223 1ST HOSP IP/OBS HIGH 75: CPT

## 2024-10-18 PROCEDURE — 71045 X-RAY EXAM CHEST 1 VIEW: CPT

## 2024-10-18 PROCEDURE — 99285 EMERGENCY DEPT VISIT HI MDM: CPT | Performed by: EMERGENCY MEDICINE

## 2024-10-18 PROCEDURE — 99285 EMERGENCY DEPT VISIT HI MDM: CPT | Performed by: STUDENT IN AN ORGANIZED HEALTH CARE EDUCATION/TRAINING PROGRAM

## 2024-10-18 PROCEDURE — 87081 CULTURE SCREEN ONLY: CPT

## 2024-10-18 PROCEDURE — 85610 PROTHROMBIN TIME: CPT

## 2024-10-18 PROCEDURE — 87811 SARS-COV-2 COVID19 W/OPTIC: CPT

## 2024-10-18 RX ORDER — VITAMIN B COMPLEX
1 CAPSULE ORAL
Status: DISCONTINUED | OUTPATIENT
Start: 2024-10-19 | End: 2024-10-18

## 2024-10-18 RX ORDER — GABAPENTIN 100 MG/1
100 CAPSULE ORAL DAILY
Status: DISCONTINUED | OUTPATIENT
Start: 2024-10-19 | End: 2024-10-19 | Stop reason: HOSPADM

## 2024-10-18 RX ORDER — ACETAMINOPHEN 325 MG/1
650 TABLET ORAL EVERY 6 HOURS PRN
Status: DISCONTINUED | OUTPATIENT
Start: 2024-10-18 | End: 2024-10-19 | Stop reason: HOSPADM

## 2024-10-18 RX ORDER — CALCIUM ACETATE 667 MG/1
667 CAPSULE ORAL 3 TIMES DAILY
Status: DISCONTINUED | OUTPATIENT
Start: 2024-10-18 | End: 2024-10-19 | Stop reason: HOSPADM

## 2024-10-18 RX ORDER — CINACALCET 30 MG/1
60 TABLET, FILM COATED ORAL DAILY
Status: DISCONTINUED | OUTPATIENT
Start: 2024-10-19 | End: 2024-10-19 | Stop reason: HOSPADM

## 2024-10-18 RX ORDER — ESCITALOPRAM OXALATE 20 MG/1
20 TABLET ORAL DAILY
Status: DISCONTINUED | OUTPATIENT
Start: 2024-10-19 | End: 2024-10-19 | Stop reason: HOSPADM

## 2024-10-18 RX ORDER — HEPARIN SODIUM 5000 [USP'U]/ML
5000 INJECTION, SOLUTION INTRAVENOUS; SUBCUTANEOUS EVERY 8 HOURS SCHEDULED
Status: DISCONTINUED | OUTPATIENT
Start: 2024-10-18 | End: 2024-10-19 | Stop reason: HOSPADM

## 2024-10-18 RX ORDER — NIFEDIPINE 30 MG/1
90 TABLET, EXTENDED RELEASE ORAL DAILY
Status: DISCONTINUED | OUTPATIENT
Start: 2024-10-19 | End: 2024-10-19 | Stop reason: HOSPADM

## 2024-10-18 RX ORDER — GUAIFENESIN 600 MG/1
600 TABLET, EXTENDED RELEASE ORAL EVERY 12 HOURS SCHEDULED
Status: DISCONTINUED | OUTPATIENT
Start: 2024-10-18 | End: 2024-10-19 | Stop reason: HOSPADM

## 2024-10-18 RX ORDER — LABETALOL 100 MG/1
400 TABLET, FILM COATED ORAL 3 TIMES DAILY
Status: DISCONTINUED | OUTPATIENT
Start: 2024-10-18 | End: 2024-10-19 | Stop reason: HOSPADM

## 2024-10-18 RX ORDER — ATORVASTATIN CALCIUM 40 MG/1
40 TABLET, FILM COATED ORAL EVERY EVENING
Status: DISCONTINUED | OUTPATIENT
Start: 2024-10-18 | End: 2024-10-19 | Stop reason: HOSPADM

## 2024-10-18 RX ORDER — LOSARTAN POTASSIUM 50 MG/1
100 TABLET ORAL
Status: DISCONTINUED | OUTPATIENT
Start: 2024-10-18 | End: 2024-10-19 | Stop reason: HOSPADM

## 2024-10-18 RX ORDER — GABAPENTIN 100 MG/1
200 CAPSULE ORAL
Status: DISCONTINUED | OUTPATIENT
Start: 2024-10-18 | End: 2024-10-19 | Stop reason: HOSPADM

## 2024-10-18 RX ORDER — FAMOTIDINE 20 MG/1
40 TABLET, FILM COATED ORAL EVERY MORNING
Status: DISCONTINUED | OUTPATIENT
Start: 2024-10-19 | End: 2024-10-19 | Stop reason: HOSPADM

## 2024-10-18 RX ORDER — TRAZODONE HYDROCHLORIDE 50 MG/1
25 TABLET, FILM COATED ORAL
Status: DISCONTINUED | OUTPATIENT
Start: 2024-10-18 | End: 2024-10-19 | Stop reason: HOSPADM

## 2024-10-18 RX ADMIN — CALCIUM ACETATE 667 MG: 667 CAPSULE ORAL at 22:18

## 2024-10-18 RX ADMIN — LOSARTAN POTASSIUM 100 MG: 50 TABLET, FILM COATED ORAL at 22:19

## 2024-10-18 RX ADMIN — ATORVASTATIN CALCIUM 40 MG: 40 TABLET, FILM COATED ORAL at 22:18

## 2024-10-18 RX ADMIN — HEPARIN SODIUM 5000 UNITS: 5000 INJECTION INTRAVENOUS; SUBCUTANEOUS at 22:18

## 2024-10-18 RX ADMIN — GUAIFENESIN 600 MG: 600 TABLET ORAL at 22:18

## 2024-10-18 RX ADMIN — GABAPENTIN 200 MG: 100 CAPSULE ORAL at 22:18

## 2024-10-18 RX ADMIN — REMDESIVIR 200 MG: 100 INJECTION, POWDER, LYOPHILIZED, FOR SOLUTION INTRAVENOUS at 22:17

## 2024-10-18 RX ADMIN — LABETALOL HYDROCHLORIDE 400 MG: 100 TABLET, FILM COATED ORAL at 22:18

## 2024-10-18 NOTE — ASSESSMENT & PLAN NOTE
-In the setting of ESRD  -Continue calcitriol 0.75 mcg 3 times a week with HD and Sensipar 60 mg daily

## 2024-10-18 NOTE — H&P
"H&P - Hospitalist   Name: Mateus Mckeon 39 y.o. male I MRN: [id number]  Unit/Bed#: ED-16 I Date of Admission: 10/18/2024   Date of Service: 10/18/2024 I Hospital Day: 0     Assessment & Plan  ESRD on hemodialysis (HCC)  Lab Results   Component Value Date    EGFR 4 10/18/2024    EGFR 8 07/16/2024    EGFR 10 02/08/2024    CREATININE 11.51 (H) 10/18/2024    CREATININE 7.21 (H) 07/16/2024    CREATININE 6.10 (H) 02/08/2024   On maintenance HD on M,W,F @ [facility]  Missed his HD session today because of flu like symptoms. Last treatment on 10/16/24  Electrolytes and acid/base stable  Volume status near euvolemic  Nephrology recommending admission for HD session tomorrow 10/19/24. Will then plan for discharge and follow regular MWF schedule.  COVID-19  Patient reporting fatigue, cough, congestion x 48 hours. Denies any fevers/chills, chest pain, or SOB.   Oxygenating well on RA  CXR negative for acute cardiopulmonary disease  Monitor under mild pathway  Patient is high risk given ESRD. He is requesting Remdesivir. Will order while inpatient  Supportive therapy with tylenol, mucinex, chloraseptic spray  Contact precautions   Hyperphosphatemia  In the setting of ESRD  Continue PhosLo 3 tablets TID with meals   Lo phosphorus diet  Type 1 diabetes mellitus on insulin therapy ([facility])  Lab Results   Component Value Date    HGBA1C 6.2 (H) 07/16/2024       No results for input(s): \"POCGLU\" in the last 72 hours.    Blood Sugar Average: Last 72 hrs:  Patient with insulin pump, he is requesting to use pump during hospitalization. Will have him sign form  Continue accu checks  Hypoglycemia protocol    Anemia due to chronic kidney disease, on chronic dialysis ([facility])  Hemoglobin stable 13.3  Not on HETAL as an outpatient  Monitor daily CBC    Primary hypertension  BP elevated, likely in the setting of not taking anti-hypertensives in anticipation of HD today  Continue home PTA medications: labetalol, nifedipine, olmesartan  Monitor " vitals per routine  Secondary hyperparathyroidism of renal origin (HCC)  In the setting of ESRD  Continue calcitriol 0.75mcg 3 times a week with HD and sensipar 60mg daily      VTE Pharmacologic Prophylaxis: VTE Score: 3 Moderate Risk (Score 3-4) - Pharmacological DVT Prophylaxis Ordered: heparin.  Code Status: Level 1 - Full Code   Discussion with family: Updated  (mother) at bedside.    Anticipated Length of Stay: Patient will be admitted on an observation basis with an anticipated length of stay of less than 2 midnights secondary to ESRD requiring HD.    History of Present Illness   Chief Complaint: Flu like symptoms    Mateus Mckeon is a 39 y.o. male with a PMH of hypertension, diabetes, anemia, ESRD on dialysis.  Patient presents with flulike symptoms such as cough, congestion, fatigue over the last 48 hours.  Denies any fever/chills, chest pain, shortness of breath, abdominal pain, nausea/vomiting/diarrhea.  Patient was scheduled for dialysis session today; however, he did not go to his session due to his flulike symptoms.  Patient underwent infectious workup and tested positive for COVID-19.  Chest x-ray negative for any cardiopulmonary disease.  Oxygenating well on room air.  Discussed with nephrology team and they are recommending admission for dialysis session tomorrow.  Continue supportive care and monitoring under mild COVID pathway.  All patient and family questions answered to the best of my ability.    Review of Systems   Constitutional:  Positive for fatigue. Negative for chills and fever.   HENT:  Positive for congestion and sore throat. Negative for ear pain.    Eyes:  Negative for pain and visual disturbance.   Respiratory:  Positive for cough. Negative for shortness of breath.    Cardiovascular:  Negative for chest pain and palpitations.   Gastrointestinal:  Negative for abdominal pain and vomiting.   Genitourinary:  Negative for dysuria and hematuria.   Musculoskeletal:   Negative for arthralgias and back pain.   Skin:  Negative for color change and rash.   Neurological:  Negative for seizures and syncope.   All other systems reviewed and are negative.      Historical Information   Past Medical History:   Diagnosis Date    Acute kidney injury (HCC)     Ambulates with cane     Anuria     Anxiety     Cellulitis of right elbow 03/31/2021    Chronic kidney disease     Depression     Diabetes mellitus (HCC)     Diarrhea     Emesis 10/24/2020    End stage renal disease (HCC) 02/11/2018    Formatting of this note might be different from the original. Last Assessment & Plan:  Secondary to DM.  On nightly PD.  Followed by Nephro.  Patient considering transplant for kidney and pancreas through [facility] Formatting of this note might be different from the original. Last Assessment & Plan:  Formatting of this note might be different from the original. 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    Esophagitis 07/21/2015    Falls     Gastroparesis     GERD (gastroesophageal reflux disease)     History of shingles 2010    History of transfusion 02/2018    no adverse reaction    Hyperlipidemia     Hyperphosphatemia     Hypertension     Hypoglycemia 07/15/2022    Itching     Mastoiditis of right side 07/15/2022    Muscle weakness     general unsteadiness    Obesity (BMI 30.0-34.9) 09/09/2019    Orthostatic hypotension 10/25/2020    Peripheral polyneuropathy 11/20/2019    PONV (postoperative nausea and vomiting) 01/26/2018    Protein-calorie malnutrition (HCC) 11/23/2020    Recurrent peritonitis (HCC) due to peritoneal dialysis catheter 07/31/2020    Retinopathy     Seizures (HCC)     early 2020 - one time    Skin abnormality     some dime size areas where skin was scratched from itching    Sleep apnea     Spontaneous bacterial peritonitis (HCC) 10/19/2020    Squamous cell skin cancer     left temple    Stroke (HCC)     x2 - off balance/no driving/fatigue    Swelling  of both lower extremities     Traumatic onycholysis 07/21/2022    Vomiting     Wears glasses      Past Surgical History:   Procedure Laterality Date    CARDIAC ELECTROPHYSIOLOGY PROCEDURE N/A 9/21/2023    Procedure: Cardiac loop recorder explant;  Surgeon: Parish Morgan MD;  [facility]: [facility];  Service: Cardiology    CARDIAC LOOP RECORDER  05/2018    COLONOSCOPY      EGD      EYE SURGERY Right     IR AV FISTULAGRAM/GRAFTOGRAM  02/23/2021    IR CEREBRAL ANGIOGRAPHY  1/12/2024    IR CEREBRAL ANGIOGRAPHY / INTERVENTION  1/5/2024    IR TUNNELED CENTRAL LINE PLACEMENT  02/16/2021    IR TUNNELED DIALYSIS CATHETER PLACEMENT  11/18/2020    IR TUNNELED DIALYSIS CATHETER REMOVAL  02/12/2021    IR TUNNELED DIALYSIS CATHETER REMOVAL  03/11/2021    MOHS SURGERY Left 12/14/2022    Left temple with Dr. Hassan    PERITONEAL CATHETER INSERTION N/A 08/27/2018    Procedure: UNROOF PD CATHETER;  Surgeon: Felipe Lindo DO;  Location: [facility];  Service: General    VA ARTERIOVENOUS ANASTOMOSIS OPEN DIRECT Left 11/09/2020    Procedure: CREATION FISTULA  ARTERIOVENOUS (AV) - LEFT WRIST;  Surgeon: Placido Altamirano MD;  Location: [facility];  Service: Vascular    VA ESOPHAGOGASTRODUODENOSCOPY TRANSORAL DIAGNOSTIC N/A 04/18/2019    Procedure: ESOPHAGOGASTRODUODENOSCOPY (EGD);  Surgeon: Ale Figueroa MD;  Location: [facility];  Service: Gastroenterology    VA LAPS INSERTION TUNNELED INTRAPERITONEAL CATHETER N/A 08/06/2018    Procedure: LAPAROSCOPIC PD CATHETER PLACEMENT;  Surgeon: Felipe Lindo DO;  Location: [facility];  Service: General    VA REMOVAL TUNNELED INTRAPERITONEAL CATHETER N/A 11/18/2020    Procedure: REMOVAL CATHETER PERITONEAL DIALYSIS;  Surgeon: Abdifatah Ty MD;  [facility]: [facility];  Service: General    TONSILLECTOMY      UPPER GASTROINTESTINAL ENDOSCOPY       Social History     Tobacco Use    Smoking status: Former     Current packs/day: 0.00     Average packs/day: 0.5 packs/day for 12.0 years (6.0 ttl  pk-yrs)     Types: Cigarettes     Start date: 2006     Quit date: 2018     Years since quittin.7    Smokeless tobacco: Never    Tobacco comments:     quit 2018   Vaping Use    Vaping status: Every Day    Substances: THC, CBD   Substance and Sexual Activity    Alcohol use: Not Currently    Drug use: Yes     Types: Marijuana     Comment: medical marijuana last vaped 2024    Sexual activity: Not on file     E-Cigarette/Vaping    E-Cigarette Use Current Every Day User     Comments medical marijuana      E-Cigarette/Vaping Substances    Nicotine No     THC Yes     CBD Yes     Flavoring No     Other No     Unknown No      Family History   Problem Relation Age of Onset    Breast cancer Mother     Hypertension Mother     Hyperlipidemia Father     Hypertension Father     Leukemia Maternal Grandmother     Hyperlipidemia Maternal Grandfather     Hypertension Maternal Grandfather     Hyperlipidemia Paternal Grandmother     Hypertension Paternal Grandmother     Heart disease Paternal Grandfather         cardiac disorder    Diabetes Paternal Grandfather      Social History:  Marital Status: Single   Occupation: NA  Patient Pre-hospital Living Situation: Home  Patient Pre-hospital Level of Mobility: walks  Patient Pre-hospital Diet Restrictions: Renal, diabetic    Meds/Allergies   I have reviewed home medications with patient personally.  Prior to Admission medications    Medication Sig Start Date End Date Taking? Authorizing Provider   aspirin (ECOTRIN LOW STRENGTH) 81 mg EC tablet Take 81 mg by mouth daily    Historical Provider, MD   atorvastatin (LIPITOR) 40 mg tablet TAKE 1 TABLET BY MOUTH ONCE DAILY IN THE EVENING 9/10/24   Dania Sher DO   b complex vitamins capsule Take 1 capsule by mouth daily before lunch     Historical Provider, MD   calcium acetate (PHOSLO) capsule Take 1 capsule (667 mg total) by mouth 3 (three) times a day 24   Court Edwards MD   cinacalcet (SENSIPAR) 60 MG tablet  Take 1 tablet (60 mg total) by mouth daily 1/23/24   Court Edwards MD   escitalopram (LEXAPRO) 20 mg tablet Take 1 tablet by mouth once daily 10/7/24   Pedrito Bermeo MD   famotidine (PEPCID) 40 MG tablet TAKE 1 TABLET BY MOUTH IN THE MORNING 5/31/24   Dania Sher DO   gabapentin (NEURONTIN) 100 mg capsule TAKE 1 CAPSULE BY MOUTH IN THE MORNING AND 2 CAPSULES AT BEDTIME. 7/22/24   Dania Sher DO   GLUCAGON EMERGENCY 1 MG injection  7/24/19   Historical Provider, MD Veliz HypoPen 1-Pack 1 MG/0.2ML SOAJ as needed 8/10/22   Historical Provider, MD   hydrOXYzine HCL (ATARAX) 10 mg tablet Take 1 tablet (10 mg total) by mouth every 6 (six) hours as needed for itching or anxiety 12/7/22   Pedrito Bermeo MD   Insulin Disposable Pump (Omnipod 5 G6 Intro, Gen 5,) KIT  1/17/23   Historical Provider, MD   Insulin Disposable Pump (Omnipod 5 G6 Pod, Gen 5,) MISC  3/24/23   Historical Provider, MD   labetalol (NORMODYNE) 200 mg tablet Take 2 tablets (400 mg total) by mouth 3 (three) times a day  Patient taking differently: Take 400 mg by mouth 3 (three) times a day 200mg in AM, 400mg at lunch and dinner 1/27/24   Yaneli Phillips MD   metoclopramide (REGLAN) 5 mg tablet Take 1 tablet by mouth three times daily as needed 4/11/24   Yaneli Phillips MD   mupirocin (BACTROBAN) 2 % ointment Apply topically 2 (two) times a day 8/22/24   Miguel Ford DPM   NIFEdipine (PROCARDIA XL) 90 mg 24 hr tablet Take 90 mg by mouth daily    Historical Provider, MD   NovoLOG 100 UNIT/ML injection  1/5/23   Historical Provider, MD   olmesartan (BENICAR) 40 mg tablet Take 40 mg by mouth daily    Historical Provider, MD   ondansetron (ZOFRAN) 4 mg tablet Take 1 tablet (4 mg total) by mouth every 8 (eight) hours as needed for nausea or vomiting 6/21/24   Dania Sher DO   Patiromer Sorbitex Calcium (VELTASSA PO) Take 5 g by mouth once a week    Historical Provider, MD   saccharomyces boulardii (FLORASTOR)  250 mg capsule Take 250 mg by mouth daily  Patient not taking: Reported on 10/1/2024    Historical Provider, MD   SPS 15 GM/60ML suspension TAKE 60 ML BY MOUTH SINGLE DOSE FOR EMERGENCY USE DURING MISSED HEMODIALYSIS 12/13/22   Historical Provider, MD   traZODone (DESYREL) 50 mg tablet TAKE 1/2 (ONE-HALF) TABLET BY MOUTH ONCE DAILY AT BEDTIME AS NEEDED FOR SLEEP 10/7/24   Pedrito Bermeo MD     Allergies   Allergen Reactions    Sulfa Antibiotics Rash       Objective :  Temp:  [98.7 °F (37.1 °C)] 98.7 °F (37.1 °C)  HR:  [73-75] 73  BP: (181)/(88) 181/88  Resp:  [18] 18  SpO2:  [97 %-99 %] 97 %  O2 Device: None (Room air)    Physical Exam  Vitals and nursing note reviewed.   Constitutional:       General: He is not in acute distress.     Appearance: He is well-developed.   HENT:      Head: Normocephalic and atraumatic.   Eyes:      Conjunctiva/sclera: Conjunctivae normal.   Cardiovascular:      Rate and Rhythm: Normal rate and regular rhythm.      Heart sounds: No murmur heard.  Pulmonary:      Effort: Pulmonary effort is normal. No respiratory distress.      Breath sounds: Normal breath sounds.   Abdominal:      Palpations: Abdomen is soft.      Tenderness: There is no abdominal tenderness.   Musculoskeletal:         General: No swelling.      Cervical back: Neck supple.   Skin:     General: Skin is warm and dry.      Capillary Refill: Capillary refill takes less than 2 seconds.   Neurological:      Mental Status: He is alert.   Psychiatric:         Mood and Affect: Mood normal.          Lines/Drains:            Lab Results: I have reviewed the following results:  Results from last 7 days   Lab Units 10/18/24  1330   WBC Thousand/uL 4.63   HEMOGLOBIN g/dL 13.3   HEMATOCRIT % 40.3   PLATELETS Thousands/uL 188   SEGS PCT % 71   LYMPHO PCT % 16   MONO PCT % 10   EOS PCT % 2     Results from last 7 days   Lab Units 10/18/24  1330   SODIUM mmol/L 135   POTASSIUM mmol/L 4.7   CHLORIDE mmol/L 95*   CO2 mmol/L 29   BUN  mg/dL 35*   CREATININE mg/dL 11.51*   ANION GAP mmol/L 11   CALCIUM mg/dL 8.8   ALBUMIN g/dL 4.3   TOTAL BILIRUBIN mg/dL 0.53   ALK PHOS U/L 131*   ALT U/L 12   AST U/L 19   GLUCOSE RANDOM mg/dL 131     Results from last 7 days   Lab Units 10/18/24  1330   INR  1.03         Lab Results   Component Value Date    HGBA1C 6.2 (H) 07/16/2024    HGBA1C 6.0 (H) 01/14/2024    HGBA1C 5.9 (H) 01/07/2024     Results from last 7 days   Lab Units 10/18/24  1330   LACTIC ACID mmol/L 1.2   PROCALCITONIN ng/ml 0.73*       Imaging Results Review: I reviewed radiology reports from this admission including: chest xray.  Other Study Results Review: No additional pertinent studies reviewed.    Administrative Statements   I have spent a total time of 45 minutes in caring for this patient on the day of the visit/encounter including Diagnostic results, Instructions for management, Patient and family education, Importance of tx compliance, Counseling / Coordination of care, Documenting in the medical record, Reviewing / ordering tests, medicine, procedures  , Obtaining or reviewing history  , and Communicating with other healthcare professionals .    ** Please Note: This note has been constructed using a voice recognition system. **

## 2024-10-18 NOTE — CONSULTS
Consultation - Nephrology   Name: Mateus Mckeon 39 y.o. male I MRN: [id number]  Unit/Bed#: ED-16 I Date of Admission: 10/18/2024   Date of Service: 10/18/2024 I Hospital Day: 0   Consults  Physician Requesting Evaluation: Jason Gasca DO   Reason for Evaluation / Principal Problem: ESRD on HD    Assessment & Plan  ESRD on hemodialysis (HCC)  Lab Results   Component Value Date    EGFR 4 10/18/2024    EGFR 8 07/16/2024    EGFR 10 02/08/2024    CREATININE 11.51 (H) 10/18/2024    CREATININE 7.21 (H) 07/16/2024    CREATININE 6.10 (H) 02/08/2024   -on maintenance HD MWF@[facility]  -Access: LUE AVF, + thrill, bruit.  Last fistulogram 1 month ago  -EDW: 90 kg  -last treatment 10/16/2024  -electrolytes and acid/base stable  -volume status near euvolemic  -no urgent need for hemodialysis  -next treatment tomorrow, 10/19/2024.  Will then plan to continue regular Monday, Wednesday, Friday schedule during hospitalization.    Primary hypertension  -BP hypertensive.  Likely in the setting of not taking antihypertensive medications in anticipation of outpatient dialysis treatment  -volume status near euvolemia  -Home Rx: Labetalol 400 mg 3 times daily, nifedipine 90 mg daily, olmesartan 40 mg daily  -UF with HD as tolerated  -Avoid hypotension or fluctuations in blood pressure.  Maintain MAP >65  -low sodium (2 gm) diet  -continue to trend    Anemia due to chronic kidney disease, on chronic dialysis (HCC)  Lab Results   Component Value Date    EGFR 4 10/18/2024    EGFR 8 07/16/2024    EGFR 10 02/08/2024    CREATININE 11.51 (H) 10/18/2024    CREATININE 7.21 (H) 07/16/2024    CREATININE 6.10 (H) 02/08/2024   -Hgb 13.3, at goal.  Goal Hgb >10.0  -Not on HETAL as outpatient due to hemoglobin at goal  -trend CBC  -transfuse for Hgb <7.0  -management per primary team    Hyperphosphatemia  -In the setting of ESRD  -Continue binders.  On PhosLo 3 tablets 3 times daily with meals and Velphoro 500 mg 3 times daily with meals  -Low  phosphorus diet  Secondary hyperparathyroidism of renal origin (HCC)  -In the setting of ESRD  -Continue calcitriol 0.75 mcg 3 times a week with HD and Sensipar 60 mg daily  Type 1 diabetes mellitus on insulin therapy (HCC)    Lab Results   Component Value Date    HGBA1C 6.2 (H) 07/16/2024   -stable  -insulin pump  -continue to optimize glycemic control  -management per primary team      HISTORY OF PRESENT ILLNESS:  Requesting Physician: Jason Gasca DO  Reason for Consult: ESRD on HD    Mateus Mckeon is a 39 y.o. year old male with ESRD on HD MWF via LUE AVF at [facility], DM 1, HTN, HLD, history CVA, ICH who was admitted to [facility] after presenting with generalized malaise, weakness, fever cough.  Patient went to his dialysis center today for dialysis and was sent directly to the ED for evaluation without a dialysis treatment due to underlying symptoms.  Last HD 10/16/2024.  CXR with no evidence of volume overload.. A renal consultation is requested today for assistance in the management of ESRD. Mateus Mckeon is a known ESRD patient who undergoes maintenance hemodialysis at [facility] on Monday, Wednesday, Friday.    PAST MEDICAL HISTORY:  Past Medical History:   Diagnosis Date    Acute kidney injury (HCC)     Ambulates with cane     Anuria     Anxiety     Cellulitis of right elbow 03/31/2021    Chronic kidney disease     Depression     Diabetes mellitus (HCC)     Diarrhea     Emesis 10/24/2020    End stage renal disease (HCC) 02/11/2018    Formatting of this note might be different from the original. Last Assessment & Plan:  Secondary to DM.  On nightly PD.  Followed by Nephro.  Patient considering transplant for kidney and pancreas through [facility] Formatting of this note might be different from the original. Last Assessment & Plan:  Formatting of this note might be different from the original. 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    Esophagitis  07/21/2015    Falls     Gastroparesis     GERD (gastroesophageal reflux disease)     History of shingles 2010    History of transfusion 02/2018    no adverse reaction    Hyperlipidemia     Hyperphosphatemia     Hypertension     Hypoglycemia 07/15/2022    Itching     Mastoiditis of right side 07/15/2022    Muscle weakness     general unsteadiness    Obesity (BMI 30.0-34.9) 09/09/2019    Orthostatic hypotension 10/25/2020    Peripheral polyneuropathy 11/20/2019    PONV (postoperative nausea and vomiting) 01/26/2018    Protein-calorie malnutrition (HCC) 11/23/2020    Recurrent peritonitis (HCC) due to peritoneal dialysis catheter 07/31/2020    Retinopathy     Seizures (HCC)     early 2020 - one time    Skin abnormality     some dime size areas where skin was scratched from itching    Sleep apnea     Spontaneous bacterial peritonitis (HCC) 10/19/2020    Squamous cell skin cancer     left temple    Stroke (HCC)     x2 - off balance/no driving/fatigue    Swelling of both lower extremities     Traumatic onycholysis 07/21/2022    Vomiting     Wears glasses        PAST SURGICAL HISTORY:  Past Surgical History:   Procedure Laterality Date    CARDIAC ELECTROPHYSIOLOGY PROCEDURE N/A 9/21/2023    Procedure: Cardiac loop recorder explant;  Surgeon: Parish Morgan MD;  Location: [facility];  Service: Cardiology    CARDIAC LOOP RECORDER  05/2018    COLONOSCOPY      EGD      EYE SURGERY Right     IR AV FISTULAGRAM/GRAFTOGRAM  02/23/2021    IR CEREBRAL ANGIOGRAPHY  1/12/2024    IR CEREBRAL ANGIOGRAPHY / INTERVENTION  1/5/2024    IR TUNNELED CENTRAL LINE PLACEMENT  02/16/2021    IR TUNNELED DIALYSIS CATHETER PLACEMENT  11/18/2020    IR TUNNELED DIALYSIS CATHETER REMOVAL  02/12/2021    IR TUNNELED DIALYSIS CATHETER REMOVAL  03/11/2021    MOHS SURGERY Left 12/14/2022    Left temple with Dr. Hassan    PERITONEAL CATHETER INSERTION N/A 08/27/2018    Procedure: UNROOF PD CATHETER;  Surgeon: Felipe Lindo DO;  Location: [facility]  OR;  Service: General    NC ARTERIOVENOUS ANASTOMOSIS OPEN DIRECT Left 2020    Procedure: CREATION FISTULA  ARTERIOVENOUS (AV) - LEFT WRIST;  Surgeon: Placido Altamirano MD;  Location: [facility];  Service: Vascular    NC ESOPHAGOGASTRODUODENOSCOPY TRANSORAL DIAGNOSTIC N/A 2019    Procedure: ESOPHAGOGASTRODUODENOSCOPY (EGD);  Surgeon: Ale Figueroa MD;  Location: [facility] GI LAB;  Service: Gastroenterology    NC LAPS INSERTION TUNNELED INTRAPERITONEAL CATHETER N/A 2018    Procedure: LAPAROSCOPIC PD CATHETER PLACEMENT;  Surgeon: Felipe Lindo DO;  Location: [facility];  Service: General    NC REMOVAL TUNNELED INTRAPERITONEAL CATHETER N/A 2020    Procedure: REMOVAL CATHETER PERITONEAL DIALYSIS;  Surgeon: Abdifatah Ty MD;  Location: [facility];  Service: General    TONSILLECTOMY      UPPER GASTROINTESTINAL ENDOSCOPY         ALLERGIES:  Allergies   Allergen Reactions    Sulfa Antibiotics Rash       SOCIAL HISTORY:  Social History     Substance and Sexual Activity   Alcohol Use Not Currently     Social History     Substance and Sexual Activity   Drug Use Yes    Types: Marijuana    Comment: medical marijuana last vaped 2024     Social History     Tobacco Use   Smoking Status Former    Current packs/day: 0.00    Average packs/day: 0.5 packs/day for 12.0 years (6.0 ttl pk-yrs)    Types: Cigarettes    Start date: 2006    Quit date: 2018    Years since quittin.7   Smokeless Tobacco Never   Tobacco Comments    quit 2018       FAMILY HISTORY:  Family History   Problem Relation Age of Onset    Breast cancer Mother     Hypertension Mother     Hyperlipidemia Father     Hypertension Father     Leukemia Maternal Grandmother     Hyperlipidemia Maternal Grandfather     Hypertension Maternal Grandfather     Hyperlipidemia Paternal Grandmother     Hypertension Paternal Grandmother     Heart disease Paternal Grandfather         cardiac disorder    Diabetes Paternal Grandfather        MEDICATIONS:  No  current facility-administered medications for this encounter.    Current Outpatient Medications:     aspirin (ECOTRIN LOW STRENGTH) 81 mg EC tablet, Take 81 mg by mouth daily, Disp: , Rfl:     atorvastatin (LIPITOR) 40 mg tablet, TAKE 1 TABLET BY MOUTH ONCE DAILY IN THE EVENING, Disp: 90 tablet, Rfl: 1    b complex vitamins capsule, Take 1 capsule by mouth daily before lunch , Disp: , Rfl:     calcium acetate (PHOSLO) capsule, Take 1 capsule (667 mg total) by mouth 3 (three) times a day, Disp: 90 capsule, Rfl: 0    cinacalcet (SENSIPAR) 60 MG tablet, Take 1 tablet (60 mg total) by mouth daily, Disp: 30 tablet, Rfl: 0    escitalopram (LEXAPRO) 20 mg tablet, Take 1 tablet by mouth once daily, Disp: 90 tablet, Rfl: 0    famotidine (PEPCID) 40 MG tablet, TAKE 1 TABLET BY MOUTH IN THE MORNING, Disp: 90 tablet, Rfl: 1    gabapentin (NEURONTIN) 100 mg capsule, TAKE 1 CAPSULE BY MOUTH IN THE MORNING AND 2 CAPSULES AT BEDTIME., Disp: 90 capsule, Rfl: 3    GLUCAGON EMERGENCY 1 MG injection, , Disp: , Rfl: 3    Gvoke HypoPen 1-Pack 1 MG/0.2ML SOAJ, as needed, Disp: , Rfl:     hydrOXYzine HCL (ATARAX) 10 mg tablet, Take 1 tablet (10 mg total) by mouth every 6 (six) hours as needed for itching or anxiety, Disp: 30 tablet, Rfl: 3    Insulin Disposable Pump (Omnipod 5 G6 Intro, Gen 5,) KIT, , Disp: , Rfl:     Insulin Disposable Pump (Omnipod 5 G6 Pod, Gen 5,) MISC, , Disp: , Rfl:     labetalol (NORMODYNE) 200 mg tablet, Take 2 tablets (400 mg total) by mouth 3 (three) times a day (Patient taking differently: Take 400 mg by mouth 3 (three) times a day 200mg in AM, 400mg at lunch and dinner), Disp: 180 tablet, Rfl: 0    metoclopramide (REGLAN) 5 mg tablet, Take 1 tablet by mouth three times daily as needed, Disp: 90 tablet, Rfl: 0    mupirocin (BACTROBAN) 2 % ointment, Apply topically 2 (two) times a day, Disp: 22 g, Rfl: 0    NIFEdipine (PROCARDIA XL) 90 mg 24 hr tablet, Take 90 mg by mouth daily, Disp: , Rfl:     NovoLOG 100  UNIT/ML injection, , Disp: , Rfl:     olmesartan (BENICAR) 40 mg tablet, Take 40 mg by mouth daily, Disp: , Rfl:     ondansetron (ZOFRAN) 4 mg tablet, Take 1 tablet (4 mg total) by mouth every 8 (eight) hours as needed for nausea or vomiting, Disp: 90 tablet, Rfl: 1    Patiromer Sorbitex Calcium (VELTASSA PO), Take 5 g by mouth once a week, Disp: , Rfl:     saccharomyces boulardii (FLORASTOR) 250 mg capsule, Take 250 mg by mouth daily (Patient not taking: Reported on 10/1/2024), Disp: , Rfl:     SPS 15 GM/60ML suspension, TAKE 60 ML BY MOUTH SINGLE DOSE FOR EMERGENCY USE DURING MISSED HEMODIALYSIS, Disp: , Rfl:     traZODone (DESYREL) 50 mg tablet, TAKE 1/2 (ONE-HALF) TABLET BY MOUTH ONCE DAILY AT BEDTIME AS NEEDED FOR SLEEP, Disp: 30 tablet, Rfl: 0    REVIEW OF SYSTEMS:  A complete 10 point review of systems was performed and found to be negative unless otherwise noted below or in the HPI.  General: + fevers, chills.   Cardiovascular: No chest pain, shortness of breath, palpitations, leg edema.  Respiratory: + Cough, no sputum production, no shortness of breath.  Gastrointestinal: +nausea, No vomiting, No abdominal pain, No diarrhea.  Genitourinary: No hematuria.    PHYSICAL EXAM:  Current Weight: Weight - Scale: 92.8 kg (204 lb 9.4 oz)  First Weight: Weight - Scale: 92.8 kg (204 lb 9.4 oz)  Vitals:    10/18/24 1304 10/18/24 1305 10/18/24 1500   BP: (!) 181/88     BP Location: Right arm     Pulse: 75  73   Resp: 18  18   Temp:  98.7 °F (37.1 °C)    TempSrc:  Oral    SpO2: 99%  97%   Weight: 92.8 kg (204 lb 9.4 oz)       No intake or output data in the 24 hours ending 10/18/24 1515  General:  Awake, alert, appears comfortable and in no acute distress. .  Skin:  No rash, warm, good skin turgor   Eyes:  PERRL, EOMI, sclerae nonicteric.  no periorbital edema   ENT:  Moist mucous membranes  Neck:  Trachea midline, symmetric.  No JVD.  No carotid bruits.  Chest:  Clear to auscultation bilaterally without wheezes,  crackles or rhonchi  CVS:  Regular rate and rhythm without murmur, gallop or rub.  S1 and S2 identified and normal.  No S3, S4.   Abdomen:  Soft, nontender, nondistended without masses.  Normal bowel sounds x 4 quadrants.  No bruit.  Extremities:  Warm, pink, motor and sensory intact and well perfused.  No cyanosis, pallor.  No BLE edema.  Neuro:  Awake, alert, oriented x3.  Grossly intact  Psych:  Appropriate affect.  Mentating appropriately.  Normal mental status exam  Access: + Thrill, bruit LUE AVF       Invasive Devices: None     Lab Results:   Results from last 7 days   Lab Units 10/18/24  1330   WBC Thousand/uL 4.63   HEMOGLOBIN g/dL 13.3   HEMATOCRIT % 40.3   PLATELETS Thousands/uL 188   SODIUM mmol/L 135   POTASSIUM mmol/L 4.7   CHLORIDE mmol/L 95*   CO2 mmol/L 29   BUN mg/dL 35*   CREATININE mg/dL 11.51*   CALCIUM mg/dL 8.8   ALK PHOS U/L 131*   ALT U/L 12   AST U/L 19     Lab Results   Component Value Date    .2 (H) 10/22/2020    CALCIUM 8.8 10/18/2024    PHOS 4.9 (H) 07/16/2024   Imaging studies:  CXR 10/18/2024:  Imaging study and images personally reviewed.  No pleural effusion, consolidation or pulmonary venous congestion    EMR, including Care Everywhere, Epic,  [facility] carrol and outpatient notes reviewed.  I have personally reviewed the blood work as stated above and in my note.  I have personally reviewed CXR imaging studies.  I have personally reviewed primary medical service and previous nephrology note.

## 2024-10-18 NOTE — ASSESSMENT & PLAN NOTE
In the setting of ESRD  Continue calcitriol 0.75mcg 3 times a week with HD and sensipar 60mg daily

## 2024-10-18 NOTE — ASSESSMENT & PLAN NOTE
"Lab Results   Component Value Date    HGBA1C 6.2 (H) 07/16/2024       No results for input(s): \"POCGLU\" in the last 72 hours.    Blood Sugar Average: Last 72 hrs:  Patient with insulin pump, he is requesting to use pump during hospitalization. Will have him sign form  Continue accu checks  Hypoglycemia protocol    "

## 2024-10-18 NOTE — ASSESSMENT & PLAN NOTE
BP elevated, likely in the setting of not taking anti-hypertensives in anticipation of HD today  Continue home PTA medications: labetalol, nifedipine, olmesartan  Monitor vitals per routine

## 2024-10-18 NOTE — ASSESSMENT & PLAN NOTE
Lab Results   Component Value Date    HGBA1C 6.2 (H) 07/16/2024   -stable  -insulin pump  -continue to optimize glycemic control  -management per primary team

## 2024-10-18 NOTE — ASSESSMENT & PLAN NOTE
-BP hypertensive.  Likely in the setting of not taking antihypertensive medications in anticipation of outpatient dialysis treatment  -volume status near euvolemia  -Home Rx: Labetalol 400 mg 3 times daily, nifedipine 90 mg daily, olmesartan 40 mg daily  -UF with HD as tolerated  -Avoid hypotension or fluctuations in blood pressure.  Maintain MAP >65  -low sodium (2 gm) diet  -continue to trend

## 2024-10-18 NOTE — ASSESSMENT & PLAN NOTE
Lab Results   Component Value Date    EGFR 4 10/18/2024    EGFR 8 07/16/2024    EGFR 10 02/08/2024    CREATININE 11.51 (H) 10/18/2024    CREATININE 7.21 (H) 07/16/2024    CREATININE 6.10 (H) 02/08/2024   -Hgb 13.3, at goal.  Goal Hgb >10.0  -Not on HETAL as outpatient due to hemoglobin at goal  -trend CBC  -transfuse for Hgb <7.0  -management per primary team

## 2024-10-18 NOTE — ASSESSMENT & PLAN NOTE
Lab Results   Component Value Date    EGFR 4 10/18/2024    EGFR 8 07/16/2024    EGFR 10 02/08/2024    CREATININE 11.51 (H) 10/18/2024    CREATININE 7.21 (H) 07/16/2024    CREATININE 6.10 (H) 02/08/2024   -on maintenance HD MWF@[facility]  -Access: LUE AVF, + thrill, bruit.  Last fistulogram 1 month ago  -EDW: 90 kg  -last treatment 10/16/2024  -electrolytes and acid/base stable  -volume status near euvolemic  -no urgent need for hemodialysis  -next treatment tomorrow, 10/19/2024.  Will then plan to continue regular Monday, Wednesday, Friday schedule during hospitalization.

## 2024-10-18 NOTE — ASSESSMENT & PLAN NOTE
Lab Results   Component Value Date    EGFR 4 10/18/2024    EGFR 8 07/16/2024    EGFR 10 02/08/2024    CREATININE 11.51 (H) 10/18/2024    CREATININE 7.21 (H) 07/16/2024    CREATININE 6.10 (H) 02/08/2024   On maintenance HD on M,W,F @ [facility]  Missed his HD session today because of flu like symptoms. Last treatment on 10/16/24  Electrolytes and acid/base stable  Volume status near euvolemic  Nephrology recommending admission for HD session tomorrow 10/19/24. Will then plan for discharge and follow regular MWF schedule.

## 2024-10-18 NOTE — ASSESSMENT & PLAN NOTE
-In the setting of ESRD  -Continue binders.  On PhosLo 3 tablets 3 times daily with meals and Velphoro 500 mg 3 times daily with meals  -Low phosphorus diet

## 2024-10-18 NOTE — ED ATTENDING ATTESTATION
10/18/2024  IJason DO, saw and evaluated the patient. I have discussed the patient with the resident/non-physician practitioner and agree with the resident's/non-physician practitioner's findings, Plan of Care, and MDM as documented in the resident's/non-physician practitioner's note, except where noted. All available labs and Radiology studies were reviewed.  I was present for key portions of any procedure(s) performed by the resident/non-physician practitioner and I was immediately available to provide assistance.       At this point I agree with the current assessment done in the Emergency Department.  I have conducted an independent evaluation of this patient a history and physical is as follows:    This is a 39-year-old male coming into the ED for evaluation of general weakness, intermittent fevers, since yesterday.  He has a dialysis patient, has HD Monday Wednesday Friday, he went to dialysis today and they sent him here prior to dialyzing him because he looked unwell.  He has had an occasional cough.  He feels tired and generally ill but no other focal complaints.    PE:  The patient is well appearing, non-toxic, in NAD. Head: normocephalic, atraumatic. HEENT: mucous membranes moist.  Lungs: CTA b/l, no resp distress. Heart: RRR. No M/R/G. Abdomen: NT, ND, no R/R/G. Neuro: CN2-12 intact, GCS 15. Normal strength and sensation, normal speech and gait. Cap refill < 2 sec, skin warm and dry. No rashes or lesions.  L forearm IV fistula, no bleeding, good thrill.    ED workup - Covid19 positive. Case d/w nephrology and [facility] - given missed HD, nephrology recommend admission for HD in the morning (unable to perform today in hospital)      ED Course         Critical Care Time  Procedures

## 2024-10-19 ENCOUNTER — APPOINTMENT (OUTPATIENT)
Dept: DIALYSIS | Facility: HOSPITAL | Age: 39
End: 2024-10-19
Payer: MEDICARE

## 2024-10-19 VITALS
DIASTOLIC BLOOD PRESSURE: 91 MMHG | TEMPERATURE: 99 F | SYSTOLIC BLOOD PRESSURE: 156 MMHG | HEART RATE: 64 BPM | RESPIRATION RATE: 18 BRPM | OXYGEN SATURATION: 94 % | BODY MASS INDEX: 27.55 KG/M2 | WEIGHT: 197.53 LBS

## 2024-10-19 LAB
ANION GAP SERPL CALCULATED.3IONS-SCNC: 13 MMOL/L (ref 4–13)
ATRIAL RATE: 69 BPM
BUN SERPL-MCNC: 48 MG/DL (ref 5–25)
CALCIUM SERPL-MCNC: 8.2 MG/DL (ref 8.4–10.2)
CHLORIDE SERPL-SCNC: 95 MMOL/L (ref 96–108)
CO2 SERPL-SCNC: 27 MMOL/L (ref 21–32)
CREAT SERPL-MCNC: 13.55 MG/DL (ref 0.6–1.3)
ERYTHROCYTE [DISTWIDTH] IN BLOOD BY AUTOMATED COUNT: 13.7 % (ref 11.6–15.1)
GFR SERPL CREATININE-BSD FRML MDRD: 3 ML/MIN/1.73SQ M
GLUCOSE SERPL-MCNC: 115 MG/DL (ref 65–140)
GLUCOSE SERPL-MCNC: 144 MG/DL (ref 65–140)
GLUCOSE SERPL-MCNC: 185 MG/DL (ref 65–140)
HCT VFR BLD AUTO: 37.2 % (ref 36.5–49.3)
HGB BLD-MCNC: 12.6 G/DL (ref 12–17)
MAGNESIUM SERPL-MCNC: 2.7 MG/DL (ref 1.9–2.7)
MCH RBC QN AUTO: 33.5 PG (ref 26.8–34.3)
MCHC RBC AUTO-ENTMCNC: 33.9 G/DL (ref 31.4–37.4)
MCV RBC AUTO: 99 FL (ref 82–98)
P AXIS: 59 DEGREES
PHOSPHATE SERPL-MCNC: 4.6 MG/DL (ref 2.7–4.5)
PLATELET # BLD AUTO: 176 THOUSANDS/UL (ref 149–390)
PMV BLD AUTO: 10.9 FL (ref 8.9–12.7)
POTASSIUM SERPL-SCNC: 5.1 MMOL/L (ref 3.5–5.3)
PR INTERVAL: 174 MS
PROCALCITONIN SERPL-MCNC: 0.89 NG/ML
QRS AXIS: 62 DEGREES
QRSD INTERVAL: 74 MS
QT INTERVAL: 440 MS
QTC INTERVAL: 471 MS
RBC # BLD AUTO: 3.76 MILLION/UL (ref 3.88–5.62)
SODIUM SERPL-SCNC: 135 MMOL/L (ref 135–147)
T WAVE AXIS: 42 DEGREES
VENTRICULAR RATE: 69 BPM
WBC # BLD AUTO: 3.96 THOUSAND/UL (ref 4.31–10.16)

## 2024-10-19 PROCEDURE — 84100 ASSAY OF PHOSPHORUS: CPT | Performed by: INTERNAL MEDICINE

## 2024-10-19 PROCEDURE — 90935 HEMODIALYSIS ONE EVALUATION: CPT | Performed by: STUDENT IN AN ORGANIZED HEALTH CARE EDUCATION/TRAINING PROGRAM

## 2024-10-19 PROCEDURE — 83735 ASSAY OF MAGNESIUM: CPT | Performed by: INTERNAL MEDICINE

## 2024-10-19 PROCEDURE — 85027 COMPLETE CBC AUTOMATED: CPT | Performed by: INTERNAL MEDICINE

## 2024-10-19 PROCEDURE — 93010 ELECTROCARDIOGRAM REPORT: CPT | Performed by: INTERNAL MEDICINE

## 2024-10-19 PROCEDURE — 84145 PROCALCITONIN (PCT): CPT | Performed by: INTERNAL MEDICINE

## 2024-10-19 PROCEDURE — G0257 UNSCHED DIALYSIS ESRD PT HOS: HCPCS

## 2024-10-19 PROCEDURE — 99239 HOSP IP/OBS DSCHRG MGMT >30: CPT | Performed by: INTERNAL MEDICINE

## 2024-10-19 PROCEDURE — 82948 REAGENT STRIP/BLOOD GLUCOSE: CPT

## 2024-10-19 PROCEDURE — 80048 BASIC METABOLIC PNL TOTAL CA: CPT | Performed by: INTERNAL MEDICINE

## 2024-10-19 RX ADMIN — ASPIRIN 81 MG: 81 TABLET, COATED ORAL at 08:46

## 2024-10-19 RX ADMIN — NIFEDIPINE 90 MG: 30 TABLET, FILM COATED, EXTENDED RELEASE ORAL at 08:46

## 2024-10-19 RX ADMIN — GUAIFENESIN 600 MG: 600 TABLET ORAL at 08:46

## 2024-10-19 RX ADMIN — FAMOTIDINE 40 MG: 20 TABLET, FILM COATED ORAL at 08:46

## 2024-10-19 RX ADMIN — LABETALOL HYDROCHLORIDE 400 MG: 100 TABLET, FILM COATED ORAL at 08:46

## 2024-10-19 RX ADMIN — HEPARIN SODIUM 5000 UNITS: 5000 INJECTION INTRAVENOUS; SUBCUTANEOUS at 05:51

## 2024-10-19 RX ADMIN — B-COMPLEX W/ C & FOLIC ACID TAB 1 TABLET: TAB at 11:50

## 2024-10-19 RX ADMIN — CINACALCET 60 MG: 30 TABLET, FILM COATED ORAL at 08:47

## 2024-10-19 RX ADMIN — ACETAMINOPHEN 650 MG: 325 TABLET ORAL at 08:46

## 2024-10-19 RX ADMIN — CALCIUM ACETATE 667 MG: 667 CAPSULE ORAL at 09:00

## 2024-10-19 RX ADMIN — ESCITALOPRAM OXALATE 20 MG: 20 TABLET ORAL at 08:46

## 2024-10-19 RX ADMIN — GABAPENTIN 100 MG: 100 CAPSULE ORAL at 08:45

## 2024-10-19 NOTE — ASSESSMENT & PLAN NOTE
Patient reporting fatigue, cough, congestion x 48 hours. Denies any fevers/chills, chest pain, or SOB.   Oxygenating well on RA  CXR negative for acute cardiopulmonary disease  Monitor under mild pathway  Patient is high risk given ESRD. He is requesting Remdesivir. Will order while inpatient  Supportive therapy with tylenol, mucinex, chloraseptic spray  Contact precautions

## 2024-10-19 NOTE — PLAN OF CARE
TX plan reviewed with Dr Reyes and she is agreeable to the following: Goal is to UF 1-2 L on a 2 + bath for a serum Potassium of 4.7 on 10/18/24, 4 HR TX. 15 mins into TX, Dr Reyes saw the pt and decreased TX time by 30 mins. Pt became hypotensive on TX, blood returned. Pt felt better with blood return but request to end TX at that time. Dr Reyes is aware.    Problem: METABOLIC, FLUID AND ELECTROLYTES - ADULT  Goal: Electrolytes maintained within normal limits  Description: INTERVENTIONS:  - Monitor labs and assess patient for signs and symptoms of electrolyte imbalances  - Administer electrolyte replacement as ordered  - Monitor response to electrolyte replacements, including repeat lab results as appropriate  - Instruct patient on fluid and nutrition as appropriate  Outcome: Progressing  Goal: Fluid balance maintained  Description: INTERVENTIONS:  - Monitor labs   - Monitor I/O and WT  - Instruct patient on fluid and nutrition as appropriate  - Assess for signs & symptoms of volume excess or deficit  Outcome: Progressing     Post-Dialysis RN Treatment Note    Blood Pressure:  Pre 172/109 mm/Hg  Post 156/91 mmHg   EDW  90 kg    Weight:  Pre 89.6 kg   Post 88.5 kg   Mode of weight measurement: Standing Scale   Volume Removed  1044 ml    Treatment duration 123 minutes    NS given  No    Treatment shortened? Yes, describe: d/t hypotension and pt request   Medications given during Rx None Reported   Estimated Kt/V  None Reported   Access type: AV fistula   Access Issues: No    Report called to primary nurse   Yes,  Rosemary Roque LPN

## 2024-10-19 NOTE — DISCHARGE SUMMARY
Discharge Summary - Hospitalist   Name: Mateus Mckeon 39 y.o. male I MRN: [id number]  Unit/Bed#: S -01 I Date of Admission: 10/18/2024   Date of Service: 10/19/2024 I Hospital Day: 0     Assessment & Plan  ESRD on hemodialysis (HCC)  Lab Results   Component Value Date    EGFR 3 10/19/2024    EGFR 4 10/18/2024    EGFR 8 07/16/2024    CREATININE 13.55 (H) 10/19/2024    CREATININE 11.51 (H) 10/18/2024    CREATININE 7.21 (H) 07/16/2024   On maintenance HD on M,W,F @ [facility]  Missed his HD session today because of flu like symptoms. Last treatment on 10/16/24  Electrolytes and acid/base stable  Volume status near euvolemic  HD scheduled for today 10/19/24. Nephrology following. Okay for discharge following this and follow regular MWF schedule.  COVID-19  Patient reporting fatigue, cough, congestion x 48 hours. Denies any fevers/chills, chest pain, or SOB.   Oxygenating well on RA  CXR negative for acute cardiopulmonary disease  Monitor under mild pathway  Patient is high risk given ESRD. He is requesting Remdesivir. Will order while inpatient  Supportive therapy with tylenol, mucinex, chloraseptic spray  Contact precautions   Hyperphosphatemia  In the setting of ESRD  Continue PhosLo 3 tablets TID with meals   Lo phosphorus diet  Type 1 diabetes mellitus on insulin therapy (HCC)  Lab Results   Component Value Date    HGBA1C 6.2 (H) 07/16/2024       Recent Labs     10/18/24  2035 10/19/24  0649   POCGLU 145* 185*       Blood Sugar Average: Last 72 hrs:  (P) 165  Patient with insulin pump, he is requesting to use pump during hospitalization. Will have him sign form  Continue accu checks  Hypoglycemia protocol    Anemia due to chronic kidney disease, on chronic dialysis (HCC)  Hemoglobin stable  Not on HETAL as an outpatient  Monitor daily CBC    Primary hypertension  Continue home PTA medications: labetalol, nifedipine, olmesartan  Monitor vitals per routine  Secondary hyperparathyroidism of renal origin  (HCC)  In the setting of ESRD  Continue calcitriol 0.75mcg 3 times a week with HD and sensipar 60mg daily     Medical Problems       Resolved Problems  Date Reviewed: 9/19/2024   None       Discharging Physician / Practitioner: Brit Carrington PA-C  PCP: Dania Sher DO  Admission Date:   Admission Orders (From admission, onward)       Ordered        10/18/24 1811  Place in Observation  Once            10/18/24 1813  Place in Observation  Once                          Discharge Date: 10/19/24    Consultations During Hospital Stay:  Nephrology    Procedures Performed:   None    Significant Findings / Test Results:   CXR (10/18/24): No acute cardiopulmonary disease.     Incidental Findings:   None     Test Results Pending at Discharge (will require follow up):   None     Outpatient Tests Requested:  None    Complications:  None    Reason for Admission: ESRD on HD, COVID    Hospital Course:   Mateus Mckeon is a 39 y.o. male patient who originally presented to the hospital on 10/18/2024 due to missed dialysis session due to flu-like symptoms.  Patient's case was discussed with nephrology on admission.  Patient was admitted for dialysis today 10/19/2024 given missed regular session on Friday, 10/18/2024.  Plan will be to follow-up with regular Monday, Wednesday, Friday schedule after discharge.  Patient did test positive for COVID-19.  Chest x-ray negative for acute infection.  Afebrile and without leukocytosis.  Patient oxygenating well on room air.  He was monitored under mild pathway and received remdesivir x 2 doses.  Will recommend continued isolation precautions and supportive therapy at home.  Patient is not under any acute distress on day of discharge and eager for discharge to home.  All patient and family questions answered to the best of my ability.      Please see above list of diagnoses and related plan for additional information.     Condition at Discharge: stable    Discharge Day Visit / Exam:    Subjective:  Patient reports feeling okay today. Reports body aches and congestion but no new or worsening symptoms. Stable for discharge home.   Vitals: Blood Pressure: 151/91 (10/19/24 1030)  Pulse: 75 (10/19/24 1030)  Temperature: 99.5 °F (37.5 °C) (10/19/24 0850)  Temp Source: Oral (10/19/24 0850)  Respirations: 18 (10/19/24 1030)  Weight - Scale: 89.6 kg (197 lb 8.5 oz) (10/19/24 0850)  SpO2: 94 % (10/19/24 0824)  Physical Exam  Vitals and nursing note reviewed.   Constitutional:       General: He is not in acute distress.     Appearance: He is well-developed.   HENT:      Head: Normocephalic and atraumatic.   Eyes:      Conjunctiva/sclera: Conjunctivae normal.   Cardiovascular:      Rate and Rhythm: Normal rate and regular rhythm.      Heart sounds: No murmur heard.  Pulmonary:      Effort: Pulmonary effort is normal. No respiratory distress.      Breath sounds: Normal breath sounds.   Abdominal:      Palpations: Abdomen is soft.      Tenderness: There is no abdominal tenderness.   Musculoskeletal:         General: No swelling.      Cervical back: Neck supple.   Skin:     General: Skin is warm and dry.      Capillary Refill: Capillary refill takes less than 2 seconds.   Neurological:      Mental Status: He is alert.   Psychiatric:         Mood and Affect: Mood normal.          Discussion with Family: Updated  (mother) via phone.    Discharge instructions/Information to patient and family:   See after visit summary for information provided to patient and family.      Provisions for Follow-Up Care:  See after visit summary for information related to follow-up care and any pertinent home health orders.      Mobility at time of Discharge:   Basic Mobility Inpatient Raw Score: 24  JH-HLM Goal: 8: Walk 250 feet or more  JH-HLM Achieved: 6: Walk 10 steps or more  HLM Goal achieved. Continue to encourage appropriate mobility.     Disposition:   Home    Planned Readmission: None    Discharge  Medications:  See after visit summary for reconciled discharge medications provided to patient and/or family.      Administrative Statements   Discharge Statement:  I have spent a total time of 50 minutes in caring for this patient on the day of the visit/encounter. >30 minutes of time was spent on: Diagnostic results, Instructions for management, Patient and family education, Importance of tx compliance, Counseling / Coordination of care, Documenting in the medical record, Reviewing / ordering tests, medicine, procedures  , and Communicating with other healthcare professionals .    **Please Note: This note may have been constructed using a voice recognition system**

## 2024-10-19 NOTE — ASSESSMENT & PLAN NOTE
Lab Results   Component Value Date    EGFR 4 10/18/2024    EGFR 8 07/16/2024    EGFR 10 02/08/2024    CREATININE 11.51 (H) 10/18/2024    CREATININE 7.21 (H) 07/16/2024    CREATININE 6.10 (H) 02/08/2024   Dialysis unit/days:[facility]  Access:AVG  Patient was not able to do HD in-center because he was feeling unwell.   On HD today  Fluid restriction 1-1.5L/d  Adjust medications to GFR<10  Avoid opioids   Stable for discharge from my end

## 2024-10-19 NOTE — ASSESSMENT & PLAN NOTE
Lab Results   Component Value Date    HGBA1C 6.2 (H) 07/16/2024     HbA1c 6.2  Advised to maintain a good DM control to prevent progression of CKD   Maintain healthy diet (vegetables, fruits, whole grains, nonfat or low fat)  Weight loss  Physical activity (5 to 10 minutes to start the increase to 30 min a day)

## 2024-10-19 NOTE — ASSESSMENT & PLAN NOTE
Volume: Fluid overload  Blood pressure: Normotensive, /91mmHg   Low sodium diet  UF on HD today  Labetalol, nifedipine

## 2024-10-19 NOTE — ASSESSMENT & PLAN NOTE
Lab Results   Component Value Date    EGFR 4 10/18/2024    EGFR 8 07/16/2024    EGFR 10 02/08/2024    CREATININE 11.51 (H) 10/18/2024    CREATININE 7.21 (H) 07/16/2024    CREATININE 6.10 (H) 02/08/2024   Hemoglobin 13.2  No indication of HETAL at this time

## 2024-10-19 NOTE — ASSESSMENT & PLAN NOTE
Lab Results   Component Value Date    EGFR 3 10/19/2024    EGFR 4 10/18/2024    EGFR 8 07/16/2024    CREATININE 13.55 (H) 10/19/2024    CREATININE 11.51 (H) 10/18/2024    CREATININE 7.21 (H) 07/16/2024   On maintenance HD on M,W,F @ [facility]  Missed his HD session today because of flu like symptoms. Last treatment on 10/16/24  Electrolytes and acid/base stable  Volume status near euvolemic  HD scheduled for today 10/19/24. Nephrology following. Okay for discharge following this and follow regular MWF schedule.

## 2024-10-19 NOTE — DISCHARGE INSTR - AVS FIRST PAGE
Follow ups:  -PCP  -Nephrology    No new prescriptions    Other instructions:  -Resume your regular Monday, Wednesday, Friday schedule dialysis sessions  -Continue isolation precautions with distancing and masking due to COVID-19.  Can continue isolation from 10 days of symptom onset.  Continue as needed Tylenol, Mucinex, Chloraseptic spray for symptomatic therapy.

## 2024-10-19 NOTE — ASSESSMENT & PLAN NOTE
Management as per ED team     HEMODIALYSIS PROCEDURE NOTE  The patient was seen and examined on hemodialysis. The patient is tolerating the procedure well.  Time: 3.5 hours  Sodium: 135 Blood flow: 350   Dialyzer: F160 Potassium: 2 Dialysate flow: 800   Access: avg  Bicarbonate: 35 Ultrafiltration goal: 2L   Medications on HD: none

## 2024-10-19 NOTE — UTILIZATION REVIEW
Initial Clinical Review    Admission: Date/Time/Statement: 10/18/24 1811 observation   Admission Orders (From admission, onward)       Ordered        10/18/24 1811  Place in Observation  Once            10/18/24 1813  Place in Observation  Once                          Orders Placed This Encounter   Procedures    Place in Observation     Standing Status:   Standing     Number of Occurrences:   1     Order Specific Question:   Level of Care     Answer:   Med Surg [16]    Place in Observation     Standing Status:   Standing     Number of Occurrences:   1     Order Specific Question:   Level of Care     Answer:   Med Surg [16]     ED Arrival Information       Expected   -    Arrival   10/18/2024 12:59    Acuity   Urgent              Means of arrival   Ambulance    Escorted by   [facility]    Service   Hospitalist    Admission type   Emergency              Arrival complaint   EMS weakness             Chief Complaint   Patient presents with    Weakness - Generalized     Pt arrives via EMS from dialysis center. Pt c/o weakness and reports fever this AM and yesterday. Pt reports taking advil this morning, unsure of dose. Denies CP/SOB. (+)chills       Initial Presentation: 39 y.o. male  to ED via EMS from  Dialysis Center .    Admitted to observation with Dx: COVID/ESRD with anemia, hyperparathyroidism/DM Type 1/hypertension.  Presented to ED with flu like symptoms starting 48 hours prior to arrival.  + fever, weak and scratchy throat. . PMHx:  hypertension, diabetes, anemia, ESRD on dialysis. . On exam: acute distress.  Appears ill.  Posterior oropharyngeal erythema.   SARS-Cov rapid antigen positive.  Procalcitonin 0.73.   bun 35. Creatinine 11.51.  Imaging shows no acute findings on CxR.     Plan includes supportive care for COVID - tylenol, mucinex, chloraseptic spray , start Remdesivir.  Continue home insulin pump, accuchecks.  Daily CBC.  Monitor BP - Continue home PTA medications: labetalol, nifedipine,  olmesartan.  Consult nephrology. Continue calcitriol 0.75mcg 3 times a week with HD and sensipar 60mg daily     10/18/24 per nephrology - ESRD on HD MWF - unable to receive dialysis today, plan for tomorrow.   Fluid restriction. Adjust medications to GFR < 10.  Appears hypervolemic and UD on dialysis.   Continue labetalol and nifedipine for hypertension    Anticipated Length of Stay/Certification Statement: Patient will be admitted on an observation basis with an anticipated length of stay of less than 2 midnights secondary to ESRD requiring HD.       Date: 10/19/24   Day 2:     ED Treatment-Medication Administration from 10/18/2024 1259 to 10/18/2024 1852       None            Scheduled Medications:  aspirin, 81 mg, Oral, Daily  atorvastatin, 40 mg, Oral, QPM  calcium acetate, 667 mg, Oral, TID  cinacalcet, 60 mg, Oral, Daily  escitalopram, 20 mg, Oral, Daily  famotidine, 40 mg, Oral, QAM  gabapentin, 100 mg, Oral, Daily  gabapentin, 200 mg, Oral, HS  guaiFENesin, 600 mg, Oral, Q12H HALIE  heparin (porcine), 5,000 Units, Subcutaneous, Q8H HALIE  labetalol, 400 mg, Oral, TID  losartan, 100 mg, Oral, HS  multivitamin stress formula, 1 tablet, Oral, Daily Before Lunch  NIFEdipine, 90 mg, Oral, Daily  remdesivir, 100 mg, Intravenous, Q24H    remdesivir (Veklury) 200 mg in sodium chloride 0.9 % 290 mL IVPB  Dose: 200 mg  Freq: Every 24 hours Route: IV  Indications of Use: INFECTION CAUSED BY 2019 NOVEL CORONAVIRUS  Start: 10/18/24 1830 End: 10/18/24 2247     Continuous IV Infusions:     PRN Meds: not used.   acetaminophen, 650 mg, Oral, Q6H PRN  phenol, 1 spray, Mouth/Throat, Q2H PRN  traZODone, 25 mg, Oral, HS PRN      ED Triage Vitals   Temperature Pulse Respirations Blood Pressure SpO2 Pain Score   10/18/24 1305 10/18/24 1304 10/18/24 1304 10/18/24 1304 10/18/24 1304 10/18/24 2101   98.7 °F (37.1 °C) 75 18 (!) 181/88 99 % No Pain     Weight (last 2 days)       Date/Time Weight    10/18/24 1304 92.8 (204.59)           Vital Signs (last 3 days)       Date/Time Temp Pulse Resp BP MAP (mmHg) SpO2 O2 Device Patient Position - Orthostatic VS [place] Coma Scale Score Pain    10/18/24 23:27:19 98.9 °F (37.2 °C) 73 -- 138/91 107 97 % -- -- -- --    10/18/24 2300 -- -- -- -- -- -- -- -- 15 --    10/18/24 22:16:29 98.9 °F (37.2 °C) 73 -- 130/90 103 97 % -- -- -- --    10/18/24 22:15:56 -- 75 -- 130/90 103 95 % -- -- -- --    10/18/24 2101 -- -- -- -- -- -- -- -- -- No Pain    10/18/24 19:23:01 99.6 °F (37.6 °C) 81 18 147/95 112 97 % -- -- -- --    10/18/24 1500 -- 73 18 -- -- 97 % None (Room air) -- -- --    10/18/24 1446 -- -- -- -- -- -- -- -- 15 --    10/18/24 1445 -- -- -- -- -- -- None (Room air) -- -- --    10/18/24 1305 98.7 °F (37.1 °C) -- -- -- -- -- -- -- -- --    10/18/24 1304 -- 75 18 181/88 125 99 % None (Room air) Lying -- --              Pertinent Labs/Diagnostic Test Results:   Radiology:  XR chest 1 view portable   Final Interpretation by Vern Werner MD (10/18 1612)      No acute cardiopulmonary disease.            Workstation performed: [id number]           Cardiology:  No orders to display     GI:  No orders to display           Results from last 7 days   Lab Units 10/18/24  1330   WBC Thousand/uL 4.63   HEMOGLOBIN g/dL 13.3   HEMATOCRIT % 40.3   PLATELETS Thousands/uL 188   TOTAL NEUT ABS Thousands/µL 3.29         Results from last 7 days   Lab Units 10/18/24  1330   SODIUM mmol/L 135   POTASSIUM mmol/L 4.7   CHLORIDE mmol/L 95*   CO2 mmol/L 29   ANION GAP mmol/L 11   BUN mg/dL 35*   CREATININE mg/dL 11.51*   EGFR ml/min/1.73sq m 4   CALCIUM mg/dL 8.8     Results from last 7 days   Lab Units 10/18/24  1330   AST U/L 19   ALT U/L 12   ALK PHOS U/L 131*   TOTAL PROTEIN g/dL 6.9   ALBUMIN g/dL 4.3   TOTAL BILIRUBIN mg/dL 0.53     Results from last 7 days   Lab Units 10/18/24  2035   POC GLUCOSE mg/dl 145*     Results from last 7 days   Lab Units 10/18/24  1330   GLUCOSE RANDOM mg/dL 131              BETA-HYDROXYBUTYRATE   Date Value Ref Range Status   11/26/2023 0.9 (H) <0.6 mmol/L Final   12/15/2020 0.1 <0.6 mmol/L Final   11/21/2020 4.4 (H) <0.6 mmol/L Final                              Results from last 7 days   Lab Units 10/18/24  1330   PROTIME seconds 14.2   INR  1.03   PTT seconds 35*         Results from last 7 days   Lab Units 10/18/24  1330   PROCALCITONIN ng/ml 0.73*     Results from last 7 days   Lab Units 10/18/24  1330   LACTIC ACID mmol/L 1.2                                                                                 Results from last 7 days   Lab Units 10/18/24  1356 10/18/24  1330   BLOOD CULTURE  Received in Microbiology Lab. Culture in Progress. Received in Microbiology Lab. Culture in Progress.                   Past Medical History:   Diagnosis Date    Acute kidney injury (HCC)     Ambulates with cane     Anuria     Anxiety     Cellulitis of right elbow 03/31/2021    Chronic kidney disease     Depression     Diabetes mellitus (HCC)     Diarrhea     Emesis 10/24/2020    End stage renal disease (HCC) 02/11/2018    Formatting of this note might be different from the original. Last Assessment & Plan:  Secondary to DM.  On nightly PD.  Followed by Nephro.  [facility] Formatting of this note might be different from the original. Last Assessment & Plan:  Formatting of this note might be different from the original. 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    Esophagitis 07/21/2015    Falls     Gastroparesis     GERD (gastroesophageal reflux disease)     History of shingles 2010    History of transfusion 02/2018    no adverse reaction    Hyperlipidemia     Hyperphosphatemia     Hypertension     Hypoglycemia 07/15/2022    Itching     Mastoiditis of right side 07/15/2022    Muscle weakness     general unsteadiness    Obesity (BMI 30.0-34.9) 09/09/2019    Orthostatic hypotension 10/25/2020     Peripheral polyneuropathy 11/20/2019    PONV (postoperative nausea and vomiting) 01/26/2018    Protein-calorie malnutrition (HCC) 11/23/2020    Recurrent peritonitis (HCC) due to peritoneal dialysis catheter 07/31/2020    Retinopathy     Seizures (HCC)     early 2020 - one time    Skin abnormality     some dime size areas where skin was scratched from itching    Sleep apnea     Spontaneous bacterial peritonitis (HCC) 10/19/2020    Squamous cell skin cancer     left temple    Stroke (HCC)     x2 - off balance/no driving/fatigue    Swelling of both lower extremities     Traumatic onycholysis 07/21/2022    Vomiting     Wears glasses      Present on Admission:   Hyperphosphatemia   Type 1 diabetes mellitus on insulin therapy (HCC)      Admitting Diagnosis: Weakness [R53.1]  Dialysis patient (HCC) [Z99.2]  COVID-19 virus infection [U07.1]  Age/Sex: 39 y.o. male    Network Utilization Review Department  ATTENTION: Please call with any questions or concerns to [phone] and carefully listen to the prompts so that you are directed to the right person. All voicemails are confidential.   For Discharge needs, contact Care Management DC Support Team at [phone] opt. [phone]  Send all requests for admission clinical reviews, approved or denied determinations and any other requests to dedicated fax number below belonging to the campus where the patient is receiving treatment. List of dedicated fax numbers for the Facilities:  FACILITY NAME UR FAX NUMBER   ADMISSION DENIALS (Administrative/Medical Necessity) [phone]   DISCHARGE SUPPORT TEAM (NETWORK) [phone]   PARENT CHILD HEALTH (Maternity/NICU/Pediatrics) [phone]   [facility] [phone]   [facility] [phone]   [facility] [phone]   [facility] [phone]   formerly [facility] [phone]   [facility]  [phone]   [facility] [phone]   [facility] [phone]   [facility] [phone]   [facility] [phone]   [facility] [phone]   [facility] [phone]

## 2024-10-19 NOTE — PROGRESS NOTES
Progress Note - Nephrology   Name: Mateus Mckeno 39 y.o. male I MRN: [id number]  Unit/Bed#: S -01 I Date of Admission: 10/18/2024   Date of Service: 10/19/2024 I Hospital Day: 0     Assessment & Plan  ESRD on hemodialysis ([facility])  Lab Results   Component Value Date    EGFR 4 10/18/2024    EGFR 8 07/16/2024    EGFR 10 02/08/2024    CREATININE 11.51 (H) 10/18/2024    CREATININE 7.21 (H) 07/16/2024    CREATININE 6.10 (H) 02/08/2024   Dialysis unit/days:[facility]  Access:AVG  Patient was not able to do HD in-center because he was feeling unwell.   On HD today  Fluid restriction 1-1.5L/d  Adjust medications to GFR<10  Avoid opioids   Stable for discharge from my end    Primary hypertension  Volume: Fluid overload  Blood pressure: Normotensive, /91mmHg   Low sodium diet  UF on HD today  Labetalol, nifedipine    Anemia due to chronic kidney disease, on chronic dialysis ([facility])  Lab Results   Component Value Date    EGFR 4 10/18/2024    EGFR 8 07/16/2024    EGFR 10 02/08/2024    CREATININE 11.51 (H) 10/18/2024    CREATININE 7.21 (H) 07/16/2024    CREATININE 6.10 (H) 02/08/2024   Hemoglobin 13.2  No indication of HETAL at this time    Secondary hyperparathyroidism of renal origin ([facility])  iPTH  158 guidelines levels KDIGO 2017  Continue PhosLo with meals  Cinacalcet daily    Type 1 diabetes mellitus on insulin therapy ([facility])    Lab Results   Component Value Date    HGBA1C 6.2 (H) 07/16/2024     HbA1c 6.2  Advised to maintain a good DM control to prevent progression of CKD   Maintain healthy diet (vegetables, fruits, whole grains, nonfat or low fat)  Weight loss  Physical activity (5 to 10 minutes to start the increase to 30 min a day)      COVID-19  Management as per ED team     HEMODIALYSIS PROCEDURE NOTE  The patient was seen and examined on hemodialysis. The patient is tolerating the procedure well.  Time: 3.5 hours  Sodium: 135 Blood flow: 350   Dialyzer: F160 Potassium: 2 Dialysate flow: 800   Access: avg   Bicarbonate: 35 Ultrafiltration goal: 2L   Medications on HD: none       I have reviewed the nephrology recommendations including hemodialysis today, with primary team, and we are in agreement with renal plan including the information outlined above. Ok for discharge from Nephrology service perspective.    Subjective   Brief History of Admission -  39 man with PMH of ESRD on HD at [facility] on an MWF, hypertension with multiple admissions with fluid overload and hypertension urgency, CVA, ICH, p/w occipital headache with BP of 228/123.  Nephrology is consulted for management of ESRD     Patient is on dialysis, well-tolerated.  No shortness of breath    Objective :  Temp:  [98.7 °F (37.1 °C)-99.6 °F (37.6 °C)] 99.5 °F (37.5 °C)  HR:  [71-81] 73  BP: (130-181)/() 159/102  Resp:  [18] 18  SpO2:  [94 %-99 %] 94 %  O2 Device: None (Room air)    Current Weight: Weight - Scale: 89.6 kg (197 lb 8.5 oz)  First Weight: Weight - Scale: 92.8 kg (204 lb 9.4 oz)  I/O         10/17 0701  10/18 0700 10/18 0701  10/19 0700 10/19 0701  10/20 0700    P.O.  100     Total Intake(mL/kg)  100 (1.1)     Urine (mL/kg/hr)  0     Total Output  0     Net  +100                  Physical Exam  General:  no acute distress at this time  Skin:  No acute rash  Eyes:  No scleral icterus and noninjected  ENT:  mucous membranes moist  Neck:  no carotid bruits  Chest:  Clear to auscultation percussion, good respiratory effort, no use of accessory respiratory muscles  CVS:  Regular rate and rhythm without rub   Abdomen:  soft and nontender   Extremities: lower extremity edema  Neuro:  No gross focality  Psych:  Alert , cooperative   Dialysis access: AV graft    Medications:    Current Facility-Administered Medications:     acetaminophen (TYLENOL) tablet 650 mg, 650 mg, Oral, Q6H PRN, Brit Carrington PA-C, 650 mg at 10/19/24 0846    aspirin (ECOTRIN LOW STRENGTH) EC tablet 81 mg, 81 mg, Oral, Daily, Brit Carrington PA-C, 81 mg at 10/19/24 0846     atorvastatin (LIPITOR) tablet 40 mg, 40 mg, Oral, QPM, Brit Carrington PA-C, 40 mg at 10/18/24 2218    calcium acetate (PHOSLO) capsule 667 mg, 667 mg, Oral, TID, Brit Carrington PA-C, 667 mg at 10/18/24 2218    cinacalcet (SENSIPAR) tablet 60 mg, 60 mg, Oral, Daily, Brit Carrington PA-C, 60 mg at 10/19/24 0847    escitalopram (LEXAPRO) tablet 20 mg, 20 mg, Oral, Daily, Brit Carrington PA-C, 20 mg at 10/19/24 0846    famotidine (PEPCID) tablet 40 mg, 40 mg, Oral, QAM, Brit Carrington PA-C, 40 mg at 10/19/24 0846    gabapentin (NEURONTIN) capsule 100 mg, 100 mg, Oral, Daily, Brit Carrington PA-C, 100 mg at 10/19/24 0845    gabapentin (NEURONTIN) capsule 200 mg, 200 mg, Oral, HS, Brit Carrington PA-C, 200 mg at 10/18/24 2218    guaiFENesin (MUCINEX) 12 hr tablet 600 mg, 600 mg, Oral, Q12H HALIE, Brit Carrington PA-C, 600 mg at 10/19/24 0846    heparin (porcine) subcutaneous injection 5,000 Units, 5,000 Units, Subcutaneous, Q8H HALIE, Brit Carrington PA-C, 5,000 Units at 10/19/24 0551    labetalol (NORMODYNE) tablet 400 mg, 400 mg, Oral, TID, Brit Carrington PA-C, 400 mg at 10/19/24 0846    losartan (COZAAR) tablet 100 mg, 100 mg, Oral, HS, AURELIANO Whittaker-CLOTILDE, 100 mg at 10/18/24 2219    multivitamin stress formula tablet 1 tablet, 1 tablet, Oral, Daily Before Lunch, Brit Carrington PA-C    NIFEdipine (PROCARDIA XL) 24 hr tablet 90 mg, 90 mg, Oral, Daily, Brit Carrington PA-C, 90 mg at 10/19/24 0846    phenol (CHLORASEPTIC) 1.4 % mucosal liquid 1 spray, 1 spray, Mouth/Throat, Q2H PRN, Brit Carrington PA-C    [COMPLETED] remdesivir (Veklury) 200 mg in sodium chloride 0.9 % 290 mL IVPB, 200 mg, Intravenous, Q24H, 200 mg at 10/18/24 2217 **FOLLOWED BY** remdesivir (Veklury) 100 mg in sodium chloride 0.9 % 270 mL IVPB, 100 mg, Intravenous, Q24H, Brit Carrington PA-C    traZODone (DESYREL) tablet 25 mg, 25 mg, Oral, HS PRN, Brit Carrington PA-C      Lab Results: I have reviewed the following results:  Results from last 7 days   Lab Units 10/19/24  0908  "10/18/24  1330   WBC Thousand/uL 3.96* 4.63   HEMOGLOBIN g/dL 12.6 13.3   HEMATOCRIT % 37.2 40.3   PLATELETS Thousands/uL 176 188   POTASSIUM mmol/L  --  4.7   CHLORIDE mmol/L  --  95*   CO2 mmol/L  --  29   BUN mg/dL  --  35*   CREATININE mg/dL  --  11.51*   CALCIUM mg/dL  --  8.8   ALBUMIN g/dL  --  4.3       Administrative Statements     Portions of the record may have been created with voice recognition software. Occasional wrong word or \"sound a like\" substitutions may have occurred due to the inherent limitations of voice recognition software. Read the chart carefully and recognize, using context, where substitutions have occurred.If you have any questions, please contact the dictating provider.  "

## 2024-10-19 NOTE — ED PROVIDER NOTES
Time reflects when diagnosis was documented in both MDM as applicable and the Disposition within this note       Time User Action Codes Description Comment    10/18/2024  6:08 PM Bianca Gonzalez Add [U07.1] Lab test positive for detection of COVID-19 virus     10/18/2024  6:08 PM Nuria Gonzaleze Remove [U07.1] Lab test positive for detection of COVID-19 virus     10/18/2024  6:08 PM CarlosNuriae Add [U07.1] COVID-19 virus infection     10/18/2024  6:10 PM Carlos, Bianca Add [Z99.2] Dialysis patient (HCC)     10/18/2024  6:10 PM Carlos, Bianca Modify [Z99.2] Dialysis patient (HCC) Missed hemodialysis session today          ED Disposition       ED Disposition   Admit    Condition   Stable    Date/Time   Fri Oct 18, 2024  6:08 PM    Comment   Case was discussed with HIEU and the patient's admission status was agreed to be Admission Status: observation status to the service of Dr. Shetty.               Assessment & Plan       Medical Decision Making  Amount and/or Complexity of Data Reviewed  Labs: ordered. Decision-making details documented in ED Course.  Radiology: ordered. Decision-making details documented in ED Course.    Risk  Decision regarding hospitalization.      See ED course for MDM.    ED Course as of 10/18/24 2238   Fri Oct 18, 2024   1509 DDx including but not limited to: viral illness, pneumonia, URI, pharyngitis, influenza, cellulitis, UTI, meningitis, meningococcemia, COVID-19      1510 SARS COV Rapid Antigen(!): Positive  Discussed option of molnupiravir for his COVID as his symptoms have been ongoing for 2 days.  Patient patient and parents would like to hold off given the side effects.   1510 LACTIC ACID: 1.2  WNL   1511 Potassium: 4.7  WNL   1627 Ambulated patient, patient's oxygen stayed above 98% during the entire time.  No significant lightheadedness or dizziness.   1627 Will reach out to nephro regarding his dialysis.   1743 XR chest 1 view portable  No acute cardiopulmonary disease.   1811 Spoke  with nephrology, will admit patient for hemodialysis tomorrow.  Will reach out to [facility].    Discussed results with patient and need for admission. Patient voices understanding and agrees with plan. All questions were addressed. No other concerns at this time.    Discussed patient's case with Dr. Saxena ([facility]) regarding admission who accepted the patient for further evaluation and management.         Medications   aspirin (ECOTRIN LOW STRENGTH) EC tablet 81 mg (has no administration in time range)   atorvastatin (LIPITOR) tablet 40 mg (has no administration in time range)   calcium acetate (PHOSLO) capsule 667 mg (has no administration in time range)   cinacalcet (SENSIPAR) tablet 60 mg (has no administration in time range)   escitalopram (LEXAPRO) tablet 20 mg (has no administration in time range)   famotidine (PEPCID) tablet 40 mg (has no administration in time range)   gabapentin (NEURONTIN) capsule 100 mg (has no administration in time range)   gabapentin (NEURONTIN) capsule 200 mg (has no administration in time range)   labetalol (NORMODYNE) tablet 400 mg (has no administration in time range)   NIFEdipine (PROCARDIA XL) 24 hr tablet 90 mg (has no administration in time range)   losartan (COZAAR) tablet 100 mg (has no administration in time range)   traZODone (DESYREL) tablet 25 mg (has no administration in time range)   acetaminophen (TYLENOL) tablet 650 mg (has no administration in time range)   heparin (porcine) subcutaneous injection 5,000 Units (has no administration in time range)   multivitamin stress formula tablet 1 tablet (has no administration in time range)   remdesivir (Veklury) 200 mg in sodium chloride 0.9 % 290 mL IVPB (has no administration in time range)     Followed by   remdesivir (Veklury) 100 mg in sodium chloride 0.9 % 270 mL IVPB (has no administration in time range)   guaiFENesin (MUCINEX) 12 hr tablet 600 mg (has no administration in time range)   phenol (CHLORASEPTIC) 1.4 % mucosal  liquid 1 spray (has no administration in time range)       ED Risk Strat Scores                           SBIRT 20yo+      Flowsheet Row Most Recent Value   Initial Alcohol Screen: US AUDIT-C     1. How often do you have a drink containing alcohol? 0 Filed at: 10/18/2024 1303   2. How many drinks containing alcohol do you have on a typical day you are drinking?  0 Filed at: 10/18/2024 1303   3a. Male UNDER 65: How often do you have five or more drinks on one occasion? 0 Filed at: 10/18/2024 1303   3b. FEMALE Any Age, or MALE 65+: How often do you have 4 or more drinks on one occassion? 0 Filed at: 10/18/2024 1303   Audit-C Score 0 Filed at: 10/18/2024 1303   ALETHEA: How many times in the past year have you...    Used an illegal drug or used a prescription medication for non-medical reasons? Never Filed at: 10/18/2024 1303                            History of Present Illness       Chief Complaint   Patient presents with    Weakness - Generalized     Pt arrives via EMS from dialysis center. Pt c/o weakness and reports fever this AM and yesterday. Pt reports taking advil this morning, unsure of dose. Denies CP/SOB. (+)chills       Past Medical History:   Diagnosis Date    Acute kidney injury (HCC)     Ambulates with cane     Anuria     Anxiety     Cellulitis of right elbow 03/31/2021    Chronic kidney disease     Depression     Diabetes mellitus (HCC)     Diarrhea     Emesis 10/24/2020    End stage renal disease (HCC) 02/11/2018    Formatting of this note might be different from the original. Last Assessment & Plan:  Secondary to DM.  On nightly PD.  Followed by Nephro.  [facility] Formatting of this note might be different from the original. Last Assessment & Plan:  Formatting of this note might be different from the original. 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    Esophagitis 07/21/2015    Falls     Gastroparesis      GERD (gastroesophageal reflux disease)     History of shingles 2010    History of transfusion 02/2018    no adverse reaction    Hyperlipidemia     Hyperphosphatemia     Hypertension     Hypoglycemia 07/15/2022    Itching     Mastoiditis of right side 07/15/2022    Muscle weakness     general unsteadiness    Obesity (BMI 30.0-34.9) 09/09/2019    Orthostatic hypotension 10/25/2020    Peripheral polyneuropathy 11/20/2019    PONV (postoperative nausea and vomiting) 01/26/2018    Protein-calorie malnutrition (HCC) 11/23/2020    Recurrent peritonitis (HCC) due to peritoneal dialysis catheter 07/31/2020    Retinopathy     Seizures (HCC)     early 2020 - one time    Skin abnormality     some dime size areas where skin was scratched from itching    Sleep apnea     Spontaneous bacterial peritonitis (HCC) 10/19/2020    Squamous cell skin cancer     left temple    Stroke (HCC)     x2 - off balance/no driving/fatigue    Swelling of both lower extremities     Traumatic onycholysis 07/21/2022    Vomiting     Wears glasses       Past Surgical History:   Procedure Laterality Date    CARDIAC ELECTROPHYSIOLOGY PROCEDURE N/A 9/21/2023    Procedure: Cardiac loop recorder explant;  Surgeon: Parish Morgan MD;  Location: [facility];  Service: Cardiology    CARDIAC LOOP RECORDER  05/2018    COLONOSCOPY      EGD      EYE SURGERY Right     IR AV FISTULAGRAM/GRAFTOGRAM  02/23/2021    IR CEREBRAL ANGIOGRAPHY  1/12/2024    IR CEREBRAL ANGIOGRAPHY / INTERVENTION  1/5/2024    IR TUNNELED CENTRAL LINE PLACEMENT  02/16/2021    IR TUNNELED DIALYSIS CATHETER PLACEMENT  11/18/2020    IR TUNNELED DIALYSIS CATHETER REMOVAL  02/12/2021    IR TUNNELED DIALYSIS CATHETER REMOVAL  03/11/2021    MOHS SURGERY Left 12/14/2022    Left temple with Dr. Hassan    PERITONEAL CATHETER INSERTION N/A 08/27/2018    Procedure: UNROOF PD CATHETER;  Surgeon: Felipe Lindo DO;  Location: [facility];  Service: General    KY ARTERIOVENOUS ANASTOMOSIS OPEN DIRECT  Left 2020    Procedure: CREATION FISTULA  ARTERIOVENOUS (AV) - LEFT WRIST;  Surgeon: Placido Altamirano MD;  Location: [facility];  Service: Vascular    AR ESOPHAGOGASTRODUODENOSCOPY TRANSORAL DIAGNOSTIC N/A 2019    Procedure: ESOPHAGOGASTRODUODENOSCOPY (EGD);  Surgeon: Ale Figueroa MD;  Location: [facility];  Service: Gastroenterology    AR LAPS INSERTION TUNNELED INTRAPERITONEAL CATHETER N/A 2018    Procedure: LAPAROSCOPIC PD CATHETER PLACEMENT;  Surgeon: Felipe Lindo DO;  Location: [facility];  Service: General    AR REMOVAL TUNNELED INTRAPERITONEAL CATHETER N/A 2020    Procedure: REMOVAL CATHETER PERITONEAL DIALYSIS;  Surgeon: Abdifatah Ty MD;  Location: [facility];  Service: General    TONSILLECTOMY      UPPER GASTROINTESTINAL ENDOSCOPY        Family History   Problem Relation Age of Onset    Breast cancer Mother     Hypertension Mother     Hyperlipidemia Father     Hypertension Father     Leukemia Maternal Grandmother     Hyperlipidemia Maternal Grandfather     Hypertension Maternal Grandfather     Hyperlipidemia Paternal Grandmother     Hypertension Paternal Grandmother     Heart disease Paternal Grandfather         cardiac disorder    Diabetes Paternal Grandfather       Social History     Tobacco Use    Smoking status: Former     Current packs/day: 0.00     Average packs/day: 0.5 packs/day for 12.0 years (6.0 ttl pk-yrs)     Types: Cigarettes     Start date: 2006     Quit date: 2018     Years since quittin.7    Smokeless tobacco: Never    Tobacco comments:     quit 2018   Vaping Use    Vaping status: Every Day    Substances: THC, CBD   Substance Use Topics    Alcohol use: Not Currently    Drug use: Yes     Types: Marijuana     Comment: medical marijuana last vaped 2024      E-Cigarette/Vaping    E-Cigarette Use Current Every Day User     Comments medical marijuana       E-Cigarette/Vaping Substances    Nicotine No     THC Yes     CBD Yes     Flavoring No     Other No      Unknown No       I have reviewed and agree with the history as documented.     HPI  (Paulo Mckeon) Mateus Mckeon is a 39 y.o. male presents to the emergency department on October 18, 2024  from his dialysis center this morning without receiving his dialysis session. Receives HD MWF. He reports having fevers for the past day, generalized weakness, and a scratchy throat.  Patient reports decreased appetite for the past couple days.  Reports sick contacts with similar symptoms at home, parents.  Denies cough, nasal congestion, chest pain, shortness of breath, abdominal pain.  Patient does not produce any urine.  Took Advil this morning with improvement of his fever.  Patient denies any other symptoms at this time.      Allergies include:  Allergies   Allergen Reactions    Sulfa Antibiotics Rash         Immunizations:  Immunization History   Administered Date(s) Administered    COVID-19 PFIZER VACCINE 0.3 ML IM 03/02/2021, 03/22/2021, 10/13/2021, 05/26/2022    COVID-19 Pfizer Vac BIVALENT Alonso-sucrose 12 Yr+ IM 10/19/2022    COVID-19 Pfizer mRNA vacc PF alonso-sucrose 12 yr and older (Comirnaty) 12/05/2023    INFLUENZA 01/22/2018, 10/07/2019, 09/13/2022    Influenza A Monovalent (H5n1), Adjuvanted, National Stock3 01/22/2018    Influenza Quadrivalent 3 years and older 10/31/2019    Influenza Quadrivalent Preservative Free 3 years and older IM 02/05/2018    Influenza, injectable, quadrivalent, preservative free 0.5 mL 09/24/2018, 10/05/2021, 09/22/2023    Influenza, recombinant, quadrivalent,injectable, preservative free 09/30/2020, 09/13/2022    Influenza, seasonal, injectable 11/10/2015    Influenza, seasonal, injectable, preservative free 09/12/2017, 09/24/2018, 09/30/2020, 10/05/2021, 09/19/2024    Pneumococcal Polysaccharide PPV23 08/31/2018, 03/08/2019    Tdap 03/08/2019    Tuberculin Skin Test-PPD Intradermal 08/29/2018, 09/03/2019, 10/06/2020     Immunizations Reviewed.    Review of Systems    Constitutional:  Positive for fever. Negative for activity change and unexpected weight change.   HENT:  Positive for sore throat.    Respiratory:  Negative for cough, chest tightness and shortness of breath.    Cardiovascular:  Negative for chest pain and palpitations.   Gastrointestinal:  Negative for abdominal pain, diarrhea, nausea and vomiting.   Genitourinary:  Negative for dysuria and hematuria.   Skin:  Negative for wound.   Allergic/Immunologic: Negative for immunocompromised state.   Neurological:  Positive for weakness (generalized). Negative for syncope.   All other systems reviewed and are negative.          Objective       ED Triage Vitals   Temperature Pulse Blood Pressure Respirations SpO2 Patient Position - Orthostatic VS   10/18/24 1305 10/18/24 1304 10/18/24 1304 10/18/24 1304 10/18/24 1304 10/18/24 1304   98.7 °F (37.1 °C) 75 (!) 181/88 18 99 % Lying      Temp Source Heart Rate Source BP Location FiO2 (%) Pain Score    10/18/24 1305 10/18/24 1304 10/18/24 1304 -- 10/18/24 2101    Oral Monitor Right arm  No Pain      Vitals      Date and Time Temp Pulse SpO2 Resp BP Pain Score FACES Pain Rating User   10/18/24 2216 98.9 °F (37.2 °C) 73 97 % -- 130/90 -- -- DII   10/18/24 2215 -- 75 95 % -- 130/90 -- -- DII   10/18/24 2101 -- -- -- -- -- No Pain -- SG   10/18/24 1923 99.6 °F (37.6 °C) 81 97 % 18 147/95 -- -- DII   10/18/24 1500 -- 73 97 % 18 -- -- -- LS   10/18/24 1305 98.7 °F (37.1 °C) -- -- -- -- -- -- AG   10/18/24 1304 -- 75 99 % 18 181/88 -- -- AG            Physical Exam  Vitals and nursing note reviewed.   Constitutional:       General: He is in acute distress.      Appearance: Normal appearance. He is ill-appearing. He is not toxic-appearing or diaphoretic.   HENT:      Head: Normocephalic and atraumatic.      Nose: Nose normal. No congestion or rhinorrhea.      Mouth/Throat:      Pharynx: Posterior oropharyngeal erythema (mild) present.   Eyes:      Extraocular Movements: Extraocular  movements intact.      Conjunctiva/sclera: Conjunctivae normal.   Cardiovascular:      Rate and Rhythm: Normal rate and regular rhythm.      Pulses: Normal pulses.      Heart sounds: No murmur heard.     No friction rub. No gallop.   Pulmonary:      Effort: Pulmonary effort is normal. No respiratory distress.      Breath sounds: Normal breath sounds. No stridor. No wheezing or rales.   Abdominal:      General: Bowel sounds are normal.      Palpations: Abdomen is soft.      Tenderness: There is no abdominal tenderness. There is no right CVA tenderness, left CVA tenderness, guarding or rebound.   Musculoskeletal:         General: No swelling or tenderness. Normal range of motion.      Cervical back: Normal range of motion. No rigidity or tenderness.      Right lower leg: No edema.      Left lower leg: No edema.   Skin:     General: Skin is warm and dry.      Capillary Refill: Capillary refill takes less than 2 seconds.   Neurological:      Mental Status: He is alert and oriented to person, place, and time.      Cranial Nerves: No cranial nerve deficit.      Sensory: No sensory deficit.      Motor: No weakness.         Results Reviewed       Procedure Component Value Units Date/Time    Blood culture #1 [[id number]] Collected: 10/18/24 1356    Lab Status: Preliminary result Specimen: Blood from Arm, Right Updated: 10/18/24 1701     Blood Culture Received in Microbiology Lab. Culture in Progress.    Blood culture #2 [[id number]] Collected: 10/18/24 1330    Lab Status: Preliminary result Specimen: Blood from Hand, Right Updated: 10/18/24 1701     Blood Culture Received in Microbiology Lab. Culture in Progress.    FLU/COVID Rapid Antigen (30 min. TAT) - Preferred screening test in ED [[id number]]  (Abnormal) Collected: 10/18/24 1436    Lab Status: Final result Specimen: Nares from Nose Updated: 10/18/24 1456     SARS COV Rapid Antigen Positive     Influenza A Rapid Antigen Negative     Influenza B Rapid Antigen Negative     Narrative:      This test has been performed using the Quidel Jodie 2 FLU+SARS Antigen test under the Emergency Use Authorization (EUA). This test has been validated by the  and verified by the performing laboratory. The Jodie uses lateral flow immunofluorescent sandwich assay to detect SARS-COV, Influenza A and Influenza B Antigen.     The Quidel Jodie 2 SARS Antigen test does not differentiate between SARS-CoV and SARS-CoV-2.     Negative results are presumptive and may be confirmed with a molecular assay, if necessary, for patient management. Negative results do not rule out SARS-CoV-2 or influenza infection and should not be used as the sole basis for treatment or patient management decisions. A negative test result may occur if the level of antigen in a sample is below the limit of detection of this test.     Positive results are indicative of the presence of viral antigens, but do not rule out bacterial infection or co-infection with other viruses.     All test results should be used as an adjunct to clinical observations and other information available to the provider.    FOR PEDIATRIC PATIENTS - copy/paste COVID Guidelines URL to browser: [email or web address]    Procalcitonin [[id number]]  (Abnormal) Collected: 10/18/24 1330    Lab Status: Final result Specimen: Blood from Hand, Right Updated: 10/18/24 1447     Procalcitonin 0.73 ng/ml     Protime-INR [[id number]]  (Normal) Collected: 10/18/24 1330    Lab Status: Final result Specimen: Blood from Hand, Right Updated: 10/18/24 1438     Protime 14.2 seconds      INR 1.03    Narrative:      INR Therapeutic Range    Indication                                             INR Range      Atrial Fibrillation                                               2.0-3.0  Hypercoagulable State                                    2.0.2.3  Left Ventricular Asist Device                            2.0-3.0  Mechanical  Heart Valve                                  -    Aortic(with afib, MI, embolism, HF, LA enlargement,    and/or coagulopathy)                                     2.0-3.0 (2.5-3.5)     Mitral                                                             2.5-3.5  Prosthetic/Bioprosthetic Heart Valve               2.0-3.0  Venous thromboembolism (VTE: VT, PE        2.0-3.0    APTT [[id number]]  (Abnormal) Collected: 10/18/24 1330    Lab Status: Final result Specimen: Blood from Hand, Right Updated: 10/18/24 1438     PTT 35 seconds     Lactic acid [[id number]]  (Normal) Collected: 10/18/24 1330    Lab Status: Final result Specimen: Blood from Hand, Right Updated: 10/18/24 1431     LACTIC ACID 1.2 mmol/L     Narrative:      Result may be elevated if tourniquet was used during collection.    Comprehensive metabolic panel [[id number]]  (Abnormal) Collected: 10/18/24 1330    Lab Status: Final result Specimen: Blood from Hand, Right Updated: 10/18/24 1430     Sodium 135 mmol/L      Potassium 4.7 mmol/L      Chloride 95 mmol/L      CO2 29 mmol/L      ANION GAP 11 mmol/L      BUN 35 mg/dL      Creatinine 11.51 mg/dL      Glucose 131 mg/dL      Calcium 8.8 mg/dL      AST 19 U/L      ALT 12 U/L      Alkaline Phosphatase 131 U/L      Total Protein 6.9 g/dL      Albumin 4.3 g/dL      Total Bilirubin 0.53 mg/dL      eGFR 4 ml/min/1.73sq m     Narrative:      National Kidney Disease Foundation guidelines for Chronic Kidney Disease (CKD):     Stage 1 with normal or high GFR (GFR > 90 mL/min/1.73 square meters)    Stage 2 Mild CKD (GFR = 60-89 mL/min/1.73 square meters)    Stage 3A Moderate CKD (GFR = 45-59 mL/min/1.73 square meters)    Stage 3B Moderate CKD (GFR = 30-44 mL/min/1.73 square meters)    Stage 4 Severe CKD (GFR = 15-29 mL/min/1.73 square meters)    Stage 5 End Stage CKD (GFR <15 mL/min/1.73 square meters)  Note: GFR calculation is accurate only with a steady state creatinine    CBC and differential [[id number]]  (Abnormal)  Collected: 10/18/24 1330    Lab Status: Final result Specimen: Blood from Hand, Right Updated: 10/18/24 1413     WBC 4.63 Thousand/uL      RBC 3.98 Million/uL      Hemoglobin 13.3 g/dL      Hematocrit 40.3 %       fL      MCH 33.4 pg      MCHC 33.0 g/dL      RDW 13.8 %      MPV 10.7 fL      Platelets 188 Thousands/uL      nRBC 0 /100 WBCs      Segmented % 71 %      Immature Grans % 0 %      Lymphocytes % 16 %      Monocytes % 10 %      Eosinophils Relative 2 %      Basophils Relative 1 %      Absolute Neutrophils 3.29 Thousands/µL      Absolute Immature Grans 0.01 Thousand/uL      Absolute Lymphocytes 0.72 Thousands/µL      Absolute Monocytes 0.48 Thousand/µL      Eosinophils Absolute 0.08 Thousand/µL      Basophils Absolute 0.05 Thousands/µL             XR chest 1 view portable   Final Interpretation by Vern Werner MD (10/18 1612)      No acute cardiopulmonary disease.            Workstation performed: [id number]             ECG 12 Lead Documentation Only    Date/Time: 10/18/2024 2:40 PM    Performed by: Bianca Gonzalez MD  Authorized by: Bianca Gonzalez MD    ECG reviewed by me, the ED Provider: yes    Patient location:  ED  Comments:      Normal sinus rhythm, rate 69, normal axis, normal QRS intervals, no ST changes, T wave flattening in lead aVL, otherwise no acute T wave abnormalities.       ED Medication and Procedure Management   Prior to Admission Medications   Prescriptions Last Dose Informant Patient Reported? Taking?   GLUCAGON EMERGENCY 1 MG injection  Self Yes No   Gvoke HypoPen 1-Pack 1 MG/0.2ML SOAJ  Self Yes No   Sig: as needed   Insulin Disposable Pump (Omnipod 5 G6 Intro, Gen 5,) KIT  Self Yes No   Insulin Disposable Pump (Omnipod 5 G6 Pod, Gen 5,) MISC  Self Yes No   NIFEdipine (PROCARDIA XL) 90 mg 24 hr tablet  Self Yes No   Sig: Take 90 mg by mouth daily   NovoLOG 100 UNIT/ML injection  Self Yes No   Patient not taking: Reported on 10/1/2024   Patiromer Sorbitex Calcium (VELTASSA PO)  Self  Yes No   Sig: Take 5 g by mouth once a week   SPS 15 GM/60ML suspension  Self Yes No   Sig: TAKE 60 ML BY MOUTH SINGLE DOSE FOR EMERGENCY USE DURING MISSED HEMODIALYSIS   aspirin (ECOTRIN LOW STRENGTH) 81 mg EC tablet  Self Yes No   Sig: Take 81 mg by mouth daily   atorvastatin (LIPITOR) 40 mg tablet  Self No No   Sig: TAKE 1 TABLET BY MOUTH ONCE DAILY IN THE EVENING   b complex vitamins capsule  Self Yes No   Sig: Take 1 capsule by mouth daily before lunch    calcium acetate (PHOSLO) capsule  Self No No   Sig: Take 1 capsule (667 mg total) by mouth 3 (three) times a day   cinacalcet (SENSIPAR) 60 MG tablet  Self No No   Sig: Take 1 tablet (60 mg total) by mouth daily   escitalopram (LEXAPRO) 20 mg tablet   No No   Sig: Take 1 tablet by mouth once daily   famotidine (PEPCID) 40 MG tablet  Self No No   Sig: TAKE 1 TABLET BY MOUTH IN THE MORNING   gabapentin (NEURONTIN) 100 mg capsule  Self No No   Sig: TAKE 1 CAPSULE BY MOUTH IN THE MORNING AND 2 CAPSULES AT BEDTIME.   hydrOXYzine HCL (ATARAX) 10 mg tablet  Self No No   Sig: Take 1 tablet (10 mg total) by mouth every 6 (six) hours as needed for itching or anxiety   labetalol (NORMODYNE) 200 mg tablet  Self No No   Sig: Take 2 tablets (400 mg total) by mouth 3 (three) times a day   Patient taking differently: Take 400 mg by mouth 3 (three) times a day 200mg in AM, 400mg at lunch and dinner   metoclopramide (REGLAN) 5 mg tablet  Self No No   Sig: Take 1 tablet by mouth three times daily as needed   mupirocin (BACTROBAN) 2 % ointment  Self No No   Sig: Apply topically 2 (two) times a day   olmesartan (BENICAR) 40 mg tablet  Self Yes No   Sig: Take 40 mg by mouth daily   ondansetron (ZOFRAN) 4 mg tablet  Self No No   Sig: Take 1 tablet (4 mg total) by mouth every 8 (eight) hours as needed for nausea or vomiting   saccharomyces boulardii (FLORASTOR) 250 mg capsule  Self Yes No   Sig: Take 250 mg by mouth daily   Patient not taking: Reported on 10/1/2024   traZODone  (DESYREL) 50 mg tablet   No No   Sig: TAKE 1/2 (ONE-HALF) TABLET BY MOUTH ONCE DAILY AT BEDTIME AS NEEDED FOR SLEEP      Facility-Administered Medications: None     Current Discharge Medication List        CONTINUE these medications which have NOT CHANGED    Details   aspirin (ECOTRIN LOW STRENGTH) 81 mg EC tablet Take 81 mg by mouth daily      atorvastatin (LIPITOR) 40 mg tablet TAKE 1 TABLET BY MOUTH ONCE DAILY IN THE EVENING  Qty: 90 tablet, Refills: 1    Associated Diagnoses: Blurred vision; Cerebrovascular accident (CVA), unspecified mechanism ([facility] [place]); Optic neuritis due to multiple sclerosis ([facility] [place]); Hypertensive urgency; Acute renal failure with acute tubular necrosis superimposed on stage 2 chronic kidney disease  ([facility] [place]); Acute renal failure with acute tubular necrosis superimposed on stage 3 chronic kidney disease ([facility] [place]); Azotemia; Hyperphosphatemia; Persistent proteinuria; Vitamin D deficiency; Cerebrovascular accident (CVA) of left pontine structure ([facility] [place]); Type 1 diabetes mellitus with diabetic nephropathy, with long-term current use of insulin ([facility] [place]); History of lacunar cerebrovascular accident (CVA); Nausea; 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; Hyperhomocysteinemia ([facility] [place]); Binocular visual disturbance      b complex vitamins capsule Take 1 capsule by mouth daily before lunch       calcium acetate (PHOSLO) capsule Take 1 capsule (667 mg total) by mouth 3 (three) times a day  Qty: 90 capsule, Refills: 0    Associated Diagnoses: End stage kidney disease ([facility] [place])      cinacalcet (SENSIPAR) 60 MG tablet Take 1 tablet (60 mg total) by mouth daily  Qty: 30 tablet, Refills: 0    Associated Diagnoses: End stage kidney disease ([facility] [place])      escitalopram (LEXAPRO) 20 mg tablet Take 1 tablet by mouth once daily  Qty: 90 tablet, Refills: 0    Associated Diagnoses: Moderate episode of recurrent major depressive disorder ([facility] [place])      famotidine (PEPCID) 40 MG tablet TAKE 1 TABLET BY MOUTH IN THE MORNING  Qty: 90  tablet, Refills: 1    Associated Diagnoses: Gastroesophageal reflux disease without esophagitis      gabapentin (NEURONTIN) 100 mg capsule TAKE 1 CAPSULE BY MOUTH IN THE MORNING AND 2 CAPSULES AT BEDTIME.  Qty: 90 capsule, Refills: 3    Associated Diagnoses: Diabetic polyneuropathy associated with type 1 diabetes mellitus (HCC); Right upper extremity numbness      GLUCAGON EMERGENCY 1 MG injection Refills: 3      Gvoke HypoPen 1-Pack 1 MG/0.2ML SOAJ as needed      hydrOXYzine HCL (ATARAX) 10 mg tablet Take 1 tablet (10 mg total) by mouth every 6 (six) hours as needed for itching or anxiety  Qty: 30 tablet, Refills: 3    Associated Diagnoses: Mild episode of recurrent major depressive disorder (HCC); Generalized anxiety disorder; Pruritus      Insulin Disposable Pump (Omnipod 5 G6 Intro, Gen 5,) KIT       Insulin Disposable Pump (Omnipod 5 G6 Pod, Gen 5,) MISC       labetalol (NORMODYNE) 200 mg tablet Take 2 tablets (400 mg total) by mouth 3 (three) times a day  Qty: 180 tablet, Refills: 0    Associated Diagnoses: Essential (primary) hypertension      metoclopramide (REGLAN) 5 mg tablet Take 1 tablet by mouth three times daily as needed  Qty: 90 tablet, Refills: 0    Associated Diagnoses: Gastroparesis      mupirocin (BACTROBAN) 2 % ointment Apply topically 2 (two) times a day  Qty: 22 g, Refills: 0    Associated Diagnoses: Abrasion of right foot, initial encounter      NIFEdipine (PROCARDIA XL) 90 mg 24 hr tablet Take 90 mg by mouth daily      NovoLOG 100 UNIT/ML injection       olmesartan (BENICAR) 40 mg tablet Take 40 mg by mouth daily      ondansetron (ZOFRAN) 4 mg tablet Take 1 tablet (4 mg total) by mouth every 8 (eight) hours as needed for nausea or vomiting  Qty: 90 tablet, Refills: 1    Associated Diagnoses: Vertigo      Patiromer Sorbitex Calcium (VELTASSA PO) Take 5 g by mouth once a week      saccharomyces boulardii (FLORASTOR) 250 mg capsule Take 250 mg by mouth daily      SPS 15 GM/60ML suspension TAKE  60 ML BY MOUTH SINGLE DOSE FOR EMERGENCY USE DURING MISSED HEMODIALYSIS      traZODone (DESYREL) 50 mg tablet TAKE 1/2 (ONE-HALF) TABLET BY MOUTH ONCE DAILY AT BEDTIME AS NEEDED FOR SLEEP  Qty: 30 tablet, Refills: 0    Associated Diagnoses: Mild episode of recurrent major depressive disorder (HCC); Generalized anxiety disorder           No discharge procedures on file.  ED SEPSIS DOCUMENTATION   Time reflects when diagnosis was documented in both MDM as applicable and the Disposition within this note       Time User Action Codes Description Comment    10/18/2024  6:08 PM Bianca Gonzalez [U07.1] Lab test positive for detection of COVID-19 virus     10/18/2024  6:08 PM Bianca Gonzalez Remove [U07.1] Lab test positive for detection of COVID-19 virus     10/18/2024  6:08 PM Bianca Gonzalez [U07.1] COVID-19 virus infection     10/18/2024  6:10 PM Bianca Gonzalez Add [Z99.2] Dialysis patient (HCC)     10/18/2024  6:10 PM Bianca Gonzalez [Z99.2] Dialysis patient (HCC) Missed hemodialysis session today                    Bianca Gonzalez MD  10/18/24 2692

## 2024-10-19 NOTE — ASSESSMENT & PLAN NOTE
Lab Results   Component Value Date    HGBA1C 6.2 (H) 07/16/2024       Recent Labs     10/18/24  2035 10/19/24  0649   POCGLU 145* 185*       Blood Sugar Average: Last 72 hrs:  (P) 165  Patient with insulin pump, he is requesting to use pump during hospitalization. Will have him sign form  Continue accu checks  Hypoglycemia protocol

## 2024-10-20 LAB — MRSA NOSE QL CULT: NORMAL

## 2024-10-21 ENCOUNTER — TRANSITIONAL CARE MANAGEMENT (OUTPATIENT)
Age: 39
End: 2024-10-21

## 2024-10-23 ENCOUNTER — APPOINTMENT (OUTPATIENT)
Facility: CLINIC | Age: 39
End: 2024-10-23
Payer: MEDICARE

## 2024-10-23 LAB
BACTERIA BLD CULT: NORMAL
BACTERIA BLD CULT: NORMAL

## 2024-10-25 ENCOUNTER — APPOINTMENT (OUTPATIENT)
Facility: CLINIC | Age: 39
End: 2024-10-25
Payer: MEDICARE

## 2024-10-28 ENCOUNTER — TELEPHONE (OUTPATIENT)
Age: 39
End: 2024-10-28

## 2024-10-28 NOTE — TELEPHONE ENCOUNTER
Patient's Mom called in to ask if patient can receive MMR vaccine when he comes for visit 10/31/24. It was recommended he get by [facility] transplant team. Please also confirm with Francine patient's OV time. She had different Dr. And time for OV 10/31/24. Please call Francine [phone]

## 2024-10-29 ENCOUNTER — TELEPHONE (OUTPATIENT)
Dept: NEPHROLOGY | Facility: CLINIC | Age: 39
End: 2024-10-29

## 2024-10-29 NOTE — TELEPHONE ENCOUNTER
Called patient and left a voicemail regarding a follow up with Dr. Riley from recall.   I scheduled in [place] Tuesday 4/29/2025 at 1:30 Pm.   mailing an appointment card.

## 2024-10-31 ENCOUNTER — OFFICE VISIT (OUTPATIENT)
Age: 39
End: 2024-10-31
Payer: MEDICARE

## 2024-10-31 VITALS
BODY MASS INDEX: 27.86 KG/M2 | HEART RATE: 71 BPM | RESPIRATION RATE: 17 BRPM | DIASTOLIC BLOOD PRESSURE: 68 MMHG | OXYGEN SATURATION: 96 % | SYSTOLIC BLOOD PRESSURE: 130 MMHG | WEIGHT: 199 LBS | HEIGHT: 71 IN

## 2024-10-31 DIAGNOSIS — U07.1 COVID-19: Primary | ICD-10-CM

## 2024-10-31 DIAGNOSIS — R20.0 RIGHT UPPER EXTREMITY NUMBNESS: ICD-10-CM

## 2024-10-31 DIAGNOSIS — E10.42 DIABETIC POLYNEUROPATHY ASSOCIATED WITH TYPE 1 DIABETES MELLITUS (HCC): ICD-10-CM

## 2024-10-31 DIAGNOSIS — Z99.2 ESRD ON HEMODIALYSIS (HCC): Chronic | ICD-10-CM

## 2024-10-31 DIAGNOSIS — N18.6 ESRD ON HEMODIALYSIS (HCC): Chronic | ICD-10-CM

## 2024-10-31 PROCEDURE — 99495 TRANSJ CARE MGMT MOD F2F 14D: CPT | Performed by: INTERNAL MEDICINE

## 2024-10-31 RX ORDER — GABAPENTIN 100 MG/1
CAPSULE ORAL
Qty: 90 CAPSULE | Refills: 5 | Status: SHIPPED | OUTPATIENT
Start: 2024-10-31 | End: 2024-10-31 | Stop reason: SDUPTHER

## 2024-10-31 RX ORDER — GABAPENTIN 100 MG/1
CAPSULE ORAL
Qty: 90 CAPSULE | Refills: 5 | Status: SHIPPED | OUTPATIENT
Start: 2024-10-31

## 2024-10-31 NOTE — ASSESSMENT & PLAN NOTE
-gabapentin 100mg in AM and 200mg qhs renewed  Lab Results   Component Value Date    HGBA1C 6.2 (H) 07/16/2024       Orders:    gabapentin (NEURONTIN) 100 mg capsule; TAKE 1 CAPSULE BY MOUTH IN THE MORNING AND 2 AT BEDTIME

## 2024-10-31 NOTE — ASSESSMENT & PLAN NOTE
Lab Results   Component Value Date    EGFR 3 10/19/2024    EGFR 4 10/18/2024    EGFR 8 07/16/2024    CREATININE 13.55 (H) 10/19/2024    CREATININE 11.51 (H) 10/18/2024    CREATININE 7.21 (H) 07/16/2024   -pt goes to HD MWF, received HD in hospital that he missed due to having COVID

## 2024-10-31 NOTE — PROGRESS NOTES
Transition of Care Visit  Name: Mateus Mckeon      : 1985      MRN: [id number]  Encounter Provider: Dania Sher DO  Encounter Date: 10/31/2024   Encounter department: [facility] INTERNAL MEDICINE    Assessment & Plan  COVID-19  -sx onset 10/17  -received remdesevir x2 in hospital  -has residual cough with normal lung exam  -recommend starting vitamin D3 2000units daily, vitamin C 500mg daily and zinc 50mg daily for 30d  -consider COVID vaccine 90d post acute infection       ESRD on hemodialysis (HCC)  Lab Results   Component Value Date    EGFR 3 10/19/2024    EGFR 4 10/18/2024    EGFR 8 2024    CREATININE 13.55 (H) 10/19/2024    CREATININE 11.51 (H) 10/18/2024    CREATININE 7.21 (H) 2024   -pt goes to HD MWF, received HD in hospital that he missed due to having COVID         Diabetic polyneuropathy associated with type 1 diabetes mellitus (HCC)  -gabapentin 100mg in AM and 200mg qhs renewed  Lab Results   Component Value Date    HGBA1C 6.2 (H) 2024       Orders:    gabapentin (NEURONTIN) 100 mg capsule; TAKE 1 CAPSULE BY MOUTH IN THE MORNING AND 2 AT BEDTIME    Right upper extremity numbness    Orders:    gabapentin (NEURONTIN) 100 mg capsule; TAKE 1 CAPSULE BY MOUTH IN THE MORNING AND 2 AT BEDTIME         History of Present Illness     Transitional Care Management Review:   Mateus Mckeon is a 39 y.o. male here for TCM follow up.     During the TCM phone call patient stated:  TCM Call       Date and time call was made  2024  7:51 AM    Hospital care reviewed  Records reviewed    Patient [facility]ial[facility]  [facility]    Comment  [facility]     Date of Admission  10/18/24    Date of discharge  10/19/24    Diagnosis  ESRD on hemodialysis    Disposition  Home    Were the patients medications reviewed and updated  Yes    Current Symptoms  None          TCM Call       Post hospital issues  None    Should patient be enrolled in anticoag  "monitoring?  No    Scheduled for follow up?  Yes    Patients specialists  Endocrinologist    Did you obtain your prescribed medications  Yes    Do you need help managing your prescriptions or medications  No    Is transportation to your appointment needed  No    I have advised the patient to call PCP with any new or worsening symptoms  Renea Gunter MA    Living Arrangements  Family members    Support System  Family    The type of support provided  Emotional; Physical    Do you have social support  Yes, as much as I need    Are you recieving any outpatient services  No    Are you recieving home care services  No    Are you using any community resources  No    Current waiver services  No    Have you fallen in the last 12 months  No    Interperter language line needed  No    Counseling  Patient    Comments  Patient appointment shceduled for 4/15/21          HPI    He is accompanied by his mother.     He was hospitalized at [facility]  10/18-10/19/24.  He had COVID, causing him to miss HD on Friday and his facility is not open on Sat.  He received HD in the hospital.     He was not eating, had mild fever on 10/17.   He has mild productive cough, Denies SOB.  Received Remdesevir x 2 that caused diarrhea.      Review of Systems   Constitutional:  Positive for appetite change. Negative for fatigue and fever.   HENT:  Positive for postnasal drip.    Respiratory:  Positive for cough (productive). Negative for shortness of breath and wheezing.    Cardiovascular:  Negative for chest pain, palpitations and leg swelling.   Gastrointestinal:  Negative for abdominal pain, constipation, diarrhea and nausea.   Genitourinary:  Negative for difficulty urinating.   Neurological:  Positive for dizziness and numbness. Negative for headaches.     Objective     /68 (BP Location: Left arm, Patient Position: Sitting, Cuff Size: Standard)   Pulse 71   Resp 17   Ht 5' 11\" (1.803 m)   Wt 90.3 kg (199 lb)   SpO2 96%   BMI 27.75 kg/m² "     Physical Exam  Vitals reviewed.   Constitutional:       General: He is not in acute distress.  HENT:      Head: Normocephalic.      Right Ear: Tympanic membrane and ear canal normal.      Left Ear: Tympanic membrane and ear canal normal.      Nose: Rhinorrhea present.      Mouth/Throat:      Mouth: Mucous membranes are moist.   Cardiovascular:      Rate and Rhythm: Normal rate and regular rhythm.      Pulses: Normal pulses.      Heart sounds: Normal heart sounds.   Pulmonary:      Effort: Pulmonary effort is normal. No respiratory distress.      Breath sounds: Normal breath sounds. No wheezing.   Musculoskeletal:      Cervical back: Neck supple. No tenderness.      Right lower leg: No edema.      Left lower leg: No edema.   Lymphadenopathy:      Cervical: No cervical adenopathy.   Neurological:      Mental Status: He is alert and oriented to person, place, and time.       Medications have been reviewed by provider in current encounter

## 2024-11-01 ENCOUNTER — OFFICE VISIT (OUTPATIENT)
Facility: CLINIC | Age: 39
End: 2024-11-01
Payer: MEDICARE

## 2024-11-01 DIAGNOSIS — I63.89 CEREBROVASCULAR ACCIDENT (CVA) DUE TO OTHER MECHANISM (HCC): Primary | ICD-10-CM

## 2024-11-01 PROCEDURE — 97110 THERAPEUTIC EXERCISES: CPT

## 2024-11-01 NOTE — PROGRESS NOTES
OT Daily Note     Today's date: 2024  Patient name: Mateus Mckeon  : 1985  MRN: [id number]  Referring provider: Dania Sher DO  Dx:   Encounter Diagnosis     ICD-10-CM    1. Cerebrovascular accident (CVA) due to other mechanism (HCC)  I63.89               Start Time: 0930  Stop Time: 1015  Total time in clinic (min): 45 minutes    PLAN OF CARE START: 24  PLAN OF CARE END: 2024  FREQUENCY: 2x/wk  PRECAUTIONS: Renal failure, actively going through dialysis; type 1 diabetes; HTN; SAH; seizure (last one was 2019)  ON FLUID RESTRICTIONS--DO NOT OFFER WATER    Subjective: Nothing new reported today    Objective: See treatment diary below    TE: Completed the following for wrist/hand strength and motor control  -Biceps curl with overhead press using 4 lb dumb bell 3x10 sets  -Squeezing gripper with 25 lbs resistance with shoulder at 90 degrees flexion and elbow in full extension, 3 second isometric holds. 3 sets performed to fatigue.   -Green flex bar bends 30x up and 30x down  -Alternating punches/rows with 4 lb dumbell for 1 set, then 2 more sets of 10 completed as body weight due to fatigue from the first set.     NMR:  -Picking up marbles with blue clothes pin to work on hand strength, fine motor control, graded release. Pt then reach up to target to place them with 2 lb wrist weight to work on UE endurance.        Assessment: Pt tolerated session well today. Required frequent rest breaks due to fatigue in the R UE with strength exercises today. Pt would benefit from continued skilled occupational therapy services to improve fine motor coordination, dexterity, strength, UE function, and functional cognition.    Plan: Continue per plan of care.       SHORT TERM GOALS ADDED 24:  Pt will increase sustained attention by completing trail making part A in 35 seconds to complete IADLs NOT MET  Pt will increase divided attention by completing trail making part B in 1 minute 30 seconds  to complete IADLs NOT MET  Pt will increase delayed recall and recall 4 /5 items on MOCA to complete IADLs GOAL MET    LONG TERM GOALS ADDED 2/20/24:  Pt will increase sustained attention by completing trail making part A in 38 seconds to complete IADLs  Pt will increase divided attention by completing trail making part B in 1 minute 10 seconds to complete IADLs  Pt will increase delayed recall and recall 5/5 items on MOCA to complete IADLs  Resume right  hand dominance to refined functional assist with all activities of daily living]

## 2024-11-05 ENCOUNTER — APPOINTMENT (EMERGENCY)
Dept: CT IMAGING | Facility: HOSPITAL | Age: 39
DRG: 304 | End: 2024-11-05
Payer: MEDICARE

## 2024-11-05 ENCOUNTER — HOSPITAL ENCOUNTER (INPATIENT)
Facility: HOSPITAL | Age: 39
LOS: 5 days | Discharge: HOME | DRG: 304 | End: 2024-11-10
Attending: EMERGENCY MEDICINE | Admitting: INTERNAL MEDICINE
Payer: MEDICARE

## 2024-11-05 DIAGNOSIS — R42 DIZZINESS: Primary | ICD-10-CM

## 2024-11-05 DIAGNOSIS — M89.9 CHRONIC KIDNEY DISEASE-MINERAL AND BONE DISORDER: ICD-10-CM

## 2024-11-05 DIAGNOSIS — Z99.2 ESRD ON HEMODIALYSIS (HCC): ICD-10-CM

## 2024-11-05 DIAGNOSIS — R25.1 TREMOR: ICD-10-CM

## 2024-11-05 DIAGNOSIS — N18.9 CHRONIC KIDNEY DISEASE-MINERAL AND BONE DISORDER: ICD-10-CM

## 2024-11-05 DIAGNOSIS — E83.9 CHRONIC KIDNEY DISEASE-MINERAL AND BONE DISORDER: ICD-10-CM

## 2024-11-05 DIAGNOSIS — N18.6 ESRD ON HEMODIALYSIS (HCC): ICD-10-CM

## 2024-11-05 DIAGNOSIS — I16.1 HYPERTENSIVE EMERGENCY: ICD-10-CM

## 2024-11-05 PROBLEM — R29.90 STROKE-LIKE SYMPTOMS: Status: ACTIVE | Noted: 2024-11-05

## 2024-11-05 LAB
2HR DELTA HS TROPONIN: -1 NG/L
4HR DELTA HS TROPONIN: 1 NG/L
ALBUMIN SERPL BCG-MCNC: 3.9 G/DL (ref 3.5–5)
ALP SERPL-CCNC: 105 U/L (ref 34–104)
ALT SERPL W P-5'-P-CCNC: 13 U/L (ref 7–52)
ANION GAP SERPL CALCULATED.3IONS-SCNC: 7 MMOL/L (ref 4–13)
APTT PPP: 32 SECONDS (ref 23–34)
AST SERPL W P-5'-P-CCNC: 20 U/L (ref 13–39)
BASOPHILS # BLD AUTO: 0.06 THOUSANDS/ÂΜL (ref 0–0.1)
BASOPHILS NFR BLD AUTO: 1 % (ref 0–1)
BILIRUB SERPL-MCNC: 0.59 MG/DL (ref 0.2–1)
BUN SERPL-MCNC: 25 MG/DL (ref 5–25)
CA-I BLD-SCNC: 0.98 MMOL/L (ref 1.12–1.32)
CALCIUM SERPL-MCNC: 8.1 MG/DL (ref 8.4–10.2)
CARDIAC TROPONIN I PNL SERPL HS: 22 NG/L
CARDIAC TROPONIN I PNL SERPL HS: 23 NG/L
CARDIAC TROPONIN I PNL SERPL HS: 24 NG/L
CHLORIDE SERPL-SCNC: 100 MMOL/L (ref 96–108)
CHOLEST SERPL-MCNC: 102 MG/DL
CO2 SERPL-SCNC: 35 MMOL/L (ref 21–32)
CREAT SERPL-MCNC: 8.71 MG/DL (ref 0.6–1.3)
EOSINOPHIL # BLD AUTO: 0.35 THOUSAND/ÂΜL (ref 0–0.61)
EOSINOPHIL NFR BLD AUTO: 6 % (ref 0–6)
ERYTHROCYTE [DISTWIDTH] IN BLOOD BY AUTOMATED COUNT: 13.1 % (ref 11.6–15.1)
EST. AVERAGE GLUCOSE BLD GHB EST-MCNC: 151 MG/DL
GFR SERPL CREATININE-BSD FRML MDRD: 6 ML/MIN/1.73SQ M
GLUCOSE SERPL-MCNC: 170 MG/DL (ref 65–140)
GLUCOSE SERPL-MCNC: 176 MG/DL (ref 65–140)
GLUCOSE SERPL-MCNC: 182 MG/DL (ref 65–140)
HBA1C MFR BLD: 6.9 %
HCT VFR BLD AUTO: 35.9 % (ref 36.5–49.3)
HDLC SERPL-MCNC: 35 MG/DL
HGB BLD-MCNC: 12 G/DL (ref 12–17)
IMM GRANULOCYTES # BLD AUTO: 0.01 THOUSAND/UL (ref 0–0.2)
IMM GRANULOCYTES NFR BLD AUTO: 0 % (ref 0–2)
INR PPP: 1.01 (ref 0.85–1.19)
LDLC SERPL CALC-MCNC: 47 MG/DL (ref 0–100)
LYMPHOCYTES # BLD AUTO: 1.31 THOUSANDS/ÂΜL (ref 0.6–4.47)
LYMPHOCYTES NFR BLD AUTO: 23 % (ref 14–44)
MAGNESIUM SERPL-MCNC: 2.5 MG/DL (ref 1.9–2.7)
MCH RBC QN AUTO: 33 PG (ref 26.8–34.3)
MCHC RBC AUTO-ENTMCNC: 33.4 G/DL (ref 31.4–37.4)
MCV RBC AUTO: 99 FL (ref 82–98)
MONOCYTES # BLD AUTO: 0.53 THOUSAND/ÂΜL (ref 0.17–1.22)
MONOCYTES NFR BLD AUTO: 9 % (ref 4–12)
NEUTROPHILS # BLD AUTO: 3.45 THOUSANDS/ÂΜL (ref 1.85–7.62)
NEUTS SEG NFR BLD AUTO: 61 % (ref 43–75)
NONHDLC SERPL-MCNC: 67 MG/DL
NRBC BLD AUTO-RTO: 0 /100 WBCS
PHOSPHATE SERPL-MCNC: 3.6 MG/DL (ref 2.7–4.5)
PLATELET # BLD AUTO: 185 THOUSANDS/UL (ref 149–390)
PLATELET # BLD AUTO: 206 THOUSANDS/UL (ref 149–390)
PMV BLD AUTO: 10.4 FL (ref 8.9–12.7)
PMV BLD AUTO: 10.4 FL (ref 8.9–12.7)
POTASSIUM SERPL-SCNC: 4.5 MMOL/L (ref 3.5–5.3)
PROT SERPL-MCNC: 6.1 G/DL (ref 6.4–8.4)
PROTHROMBIN TIME: 14.1 SECONDS (ref 12.3–15)
RBC # BLD AUTO: 3.64 MILLION/UL (ref 3.88–5.62)
SODIUM SERPL-SCNC: 142 MMOL/L (ref 135–147)
TRIGL SERPL-MCNC: 101 MG/DL
TSH SERPL DL<=0.05 MIU/L-ACNC: 5.26 UIU/ML (ref 0.45–4.5)
WBC # BLD AUTO: 5.71 THOUSAND/UL (ref 4.31–10.16)

## 2024-11-05 PROCEDURE — 85025 COMPLETE CBC W/AUTO DIFF WBC: CPT | Performed by: EMERGENCY MEDICINE

## 2024-11-05 PROCEDURE — 96365 THER/PROPH/DIAG IV INF INIT: CPT

## 2024-11-05 PROCEDURE — 84484 ASSAY OF TROPONIN QUANT: CPT

## 2024-11-05 PROCEDURE — 99285 EMERGENCY DEPT VISIT HI MDM: CPT

## 2024-11-05 PROCEDURE — 96374 THER/PROPH/DIAG INJ IV PUSH: CPT

## 2024-11-05 PROCEDURE — 96376 TX/PRO/DX INJ SAME DRUG ADON: CPT

## 2024-11-05 PROCEDURE — 84439 ASSAY OF FREE THYROXINE: CPT

## 2024-11-05 PROCEDURE — 80061 LIPID PANEL: CPT

## 2024-11-05 PROCEDURE — 84100 ASSAY OF PHOSPHORUS: CPT

## 2024-11-05 PROCEDURE — 84443 ASSAY THYROID STIM HORMONE: CPT

## 2024-11-05 PROCEDURE — 82948 REAGENT STRIP/BLOOD GLUCOSE: CPT

## 2024-11-05 PROCEDURE — 83735 ASSAY OF MAGNESIUM: CPT

## 2024-11-05 PROCEDURE — 83036 HEMOGLOBIN GLYCOSYLATED A1C: CPT

## 2024-11-05 PROCEDURE — 85730 THROMBOPLASTIN TIME PARTIAL: CPT

## 2024-11-05 PROCEDURE — 36415 COLL VENOUS BLD VENIPUNCTURE: CPT

## 2024-11-05 PROCEDURE — 99291 CRITICAL CARE FIRST HOUR: CPT | Performed by: PHYSICIAN ASSISTANT

## 2024-11-05 PROCEDURE — 82330 ASSAY OF CALCIUM: CPT

## 2024-11-05 PROCEDURE — 96375 TX/PRO/DX INJ NEW DRUG ADDON: CPT

## 2024-11-05 PROCEDURE — 70496 CT ANGIOGRAPHY HEAD: CPT

## 2024-11-05 PROCEDURE — 85049 AUTOMATED PLATELET COUNT: CPT

## 2024-11-05 PROCEDURE — 70498 CT ANGIOGRAPHY NECK: CPT

## 2024-11-05 PROCEDURE — 85610 PROTHROMBIN TIME: CPT

## 2024-11-05 PROCEDURE — 99285 EMERGENCY DEPT VISIT HI MDM: CPT | Performed by: EMERGENCY MEDICINE

## 2024-11-05 PROCEDURE — NC001 PR NO CHARGE: Performed by: INTERNAL MEDICINE

## 2024-11-05 PROCEDURE — 80053 COMPREHEN METABOLIC PANEL: CPT | Performed by: EMERGENCY MEDICINE

## 2024-11-05 PROCEDURE — 93005 ELECTROCARDIOGRAM TRACING: CPT

## 2024-11-05 RX ORDER — HYDRALAZINE HYDROCHLORIDE 20 MG/ML
10 INJECTION INTRAMUSCULAR; INTRAVENOUS ONCE
Status: COMPLETED | OUTPATIENT
Start: 2024-11-05 | End: 2024-11-05

## 2024-11-05 RX ORDER — HEPARIN SODIUM 5000 [USP'U]/ML
5000 INJECTION, SOLUTION INTRAVENOUS; SUBCUTANEOUS EVERY 8 HOURS SCHEDULED
Status: DISCONTINUED | OUTPATIENT
Start: 2024-11-05 | End: 2024-11-10 | Stop reason: HOSPADM

## 2024-11-05 RX ORDER — ASPIRIN 81 MG/1
244 TABLET, CHEWABLE ORAL ONCE
Status: COMPLETED | OUTPATIENT
Start: 2024-11-05 | End: 2024-11-05

## 2024-11-05 RX ORDER — LABETALOL HYDROCHLORIDE 5 MG/ML
20 INJECTION, SOLUTION INTRAVENOUS ONCE
Status: COMPLETED | OUTPATIENT
Start: 2024-11-05 | End: 2024-11-05

## 2024-11-05 RX ORDER — CLOPIDOGREL BISULFATE 75 MG/1
300 TABLET ORAL ONCE
Status: COMPLETED | OUTPATIENT
Start: 2024-11-05 | End: 2024-11-05

## 2024-11-05 RX ORDER — CHLORHEXIDINE GLUCONATE ORAL RINSE 1.2 MG/ML
15 SOLUTION DENTAL EVERY 12 HOURS SCHEDULED
Status: DISCONTINUED | OUTPATIENT
Start: 2024-11-05 | End: 2024-11-08

## 2024-11-05 RX ORDER — ASPIRIN 81 MG/1
81 TABLET, CHEWABLE ORAL DAILY
Status: DISCONTINUED | OUTPATIENT
Start: 2024-11-06 | End: 2024-11-10 | Stop reason: HOSPADM

## 2024-11-05 RX ORDER — ATORVASTATIN CALCIUM 40 MG/1
40 TABLET, FILM COATED ORAL
Status: DISCONTINUED | OUTPATIENT
Start: 2024-11-05 | End: 2024-11-10 | Stop reason: HOSPADM

## 2024-11-05 RX ORDER — CLOPIDOGREL BISULFATE 75 MG/1
75 TABLET ORAL DAILY
Status: DISCONTINUED | OUTPATIENT
Start: 2024-11-06 | End: 2024-11-08

## 2024-11-05 RX ADMIN — CLOPIDOGREL BISULFATE 300 MG: 75 TABLET ORAL at 17:11

## 2024-11-05 RX ADMIN — ASPIRIN 81 MG CHEWABLE TABLET 244 MG: 81 TABLET CHEWABLE at 17:11

## 2024-11-05 RX ADMIN — IOHEXOL 85 ML: 350 INJECTION, SOLUTION INTRAVENOUS at 16:39

## 2024-11-05 RX ADMIN — CHLORHEXIDINE GLUCONATE 15 ML: 1.2 RINSE ORAL at 21:07

## 2024-11-05 RX ADMIN — SODIUM CHLORIDE 10 MG/HR: 0.9 INJECTION, SOLUTION INTRAVENOUS at 19:15

## 2024-11-05 RX ADMIN — HEPARIN SODIUM 5000 UNITS: 5000 INJECTION INTRAVENOUS; SUBCUTANEOUS at 21:07

## 2024-11-05 RX ADMIN — HYDRALAZINE HYDROCHLORIDE 10 MG: 20 INJECTION, SOLUTION INTRAMUSCULAR; INTRAVENOUS at 18:34

## 2024-11-05 RX ADMIN — ATORVASTATIN CALCIUM 40 MG: 40 TABLET, FILM COATED ORAL at 21:07

## 2024-11-05 RX ADMIN — LABETALOL HYDROCHLORIDE 20 MG: 5 INJECTION, SOLUTION INTRAVENOUS at 18:04

## 2024-11-05 RX ADMIN — LABETALOL HYDROCHLORIDE 20 MG: 5 INJECTION, SOLUTION INTRAVENOUS at 17:16

## 2024-11-05 NOTE — ASSESSMENT & PLAN NOTE
Mateus Mckeon is a 39 y.o. 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    - BP on presentation: Elevated 215/100  - NIHSS: 3 for right lower quadrant visual deficit, right upper extremity sensory deficit, and right upper extremity ataxia    Workup:  - CT head:   Unremarkable for acute intracranial abnormalities  Chronic left posterior cerebellar, left corona radiata/centrum semiovale, bilateral basal ganglia lacunar infarcts noted  - CTA head and neck:  Unremarkable for large vessel occlusion or critical stenosis  Mild left extracranial ICA stenosis noted    Thrombolytic Decision: Patient not a candidate. Unclear time of onset outside appropriate time window.    Plan to place on stroke pathway to rule out acute infarct.  Other considerations include recrudescence of prior stroke symptoms given evidence of myoclonic jerks noted on exam and reported dizziness and word finding difficulties feeling similar to prior stroke symptoms.  Recommend workup for toxic/metabolic etiologies.    Plan:  Admit on stroke pathway  - MRI brain when able   - Echo  - Recommend: Fasting lipid panel, Hemoglobin A1c, TSH  - Antiplatelet load:  mg x 1 (patient reports taking home ASA 81 mg at noon today 11/5) and Plavix 300 mg x 1  - Plan to continue ASA 81 mg daily and Plavix 75 mg daily starting 11/6/2024  - Continue home atorvastatin 40 mg daily  - Systolic BP <180 given history of spontaneous basilar cistern subarachnoid hemorrhage  If MRI brain is negative, recommend normotension    - Euglycemia, Normothermia   -  Telemetry  - Frequent neuro checks  - PT/OT/Speech   - Stroke Education   - STAT CT head for any acute change in neuro exam  - Medical management and supportive care per primary team, notify with changes  - Correction of any metabolic or infectious disturbances

## 2024-11-05 NOTE — ED PROVIDER NOTES
Time reflects when diagnosis was documented in both MDM as applicable and the Disposition within this note       Time User Action Codes Description Comment    11/5/2024  4:26 PM Holden Jimenez Add [R42] Dizziness     11/5/2024  7:34 PM Holden Jimenez Add [R25.1] Tremor     11/5/2024  8:11 PM Aime Gee Add [N18.6,  Z99.2] ESRD on hemodialysis (HCC)           ED Disposition       ED Disposition   Admit    Condition   Stable    Date/Time   Tue Nov 5, 2024  7:34 PM    Comment   Case was discussed with Critical Care and the patient's admission status was agreed to be Admission Status: inpatient status to the service of Dr. Manzanares .               Assessment & Plan       Medical Decision Making  39-year-old male presenting to the ED with dizziness reminiscent of his past stroke.  Given patient's past history of strokes and onset of symptoms as well as A-fib history, a stroke alert was called.    Dr. Justice was the neurologist on.  Patient directed CT no obvious signs of stroke were found.    Recommendations per neuro were to keep patient's blood pressure under 180, aspirin load, Plavix load and admit.    While here in the ED was noted that patient's blood pressure was significantly elevated.  Patient was given 2 rounds of labetalol 20 mg as well as a dose of 10 mg hydralazine which did not check the patient's blood pressure.  Patient was then started on a nicardipine drip which did bring the patient's blood pressure into the 180s.    Given the nicardipine drip, patient was admitted to the ICU for continued management.    See ED course for interpretation of labs during ED stay.    Patient admitted.    Amount and/or Complexity of Data Reviewed  Labs: ordered. Decision-making details documented in ED Course.  Radiology: ordered.    Risk  OTC drugs.  Prescription drug management.  Decision regarding hospitalization.        ED Course as of 11/05/24 2113 Tue Nov 05, 2024   1627 Stroke alert called   2943 No  acute intracranial abnormality.     Chronic left posterior cerebellar, left corona radiata/centrum semiovale and bilateral basal ganglia lacunar infarcts. Chronic microangiopathic ischemic changes.           Workstation performed: [id number]     1659 WBC: 5.71  wnl   1659 Hemoglobin: 12.0  No anemia   1659 Creatinine(!): 8.71  Improved from previous   1659 AST: 20  wnl   1659 ALT: 13  wnl   1659 ALK PHOS(!): 105  Mild elevation   1700 Sodium: 142  wnl   1700 Potassium: 4.5  wnl   1700 Chloride: 100  wnl   1700 GFR, Calculated: 6  wnl   1705 IMPRESSION:     1. CTA head: Negative for large vessel intracranial occlusion or hemodynamically significant stenosis.     2. CTA neck: Mild left extracranial ICA stenosis. The cervical vertebral arteries are patent.           Findings were communicated to Dr. Bryan Justice at 4:54 p.m.     1731 hs TnI 0hr: 23  Will delta   1902 Critical care will evaluate         Medications   niCARdipine (CARDENE) 25 mg (STANDARD CONCENTRATION) in sodium chloride 0.9% 250 mL (2.5 mg/hr Intravenous Rate/Dose Change 11/5/24 2012)   chlorhexidine (PERIDEX) 0.12 % oral rinse 15 mL (has no administration in time range)   heparin (porcine) subcutaneous injection 5,000 Units (has no administration in time range)   iohexol (OMNIPAQUE) 350 MG/ML injection (MULTI-DOSE) 85 mL (85 mL Intravenous Given 11/5/24 1639)   aspirin chewable tablet 244 mg (244 mg Oral Given 11/5/24 1711)   clopidogrel (PLAVIX) tablet 300 mg (300 mg Oral Given 11/5/24 1711)   labetalol (NORMODYNE) injection 20 mg (20 mg Intravenous Given 11/5/24 1716)   labetalol (NORMODYNE) injection 20 mg (20 mg Intravenous Given 11/5/24 1804)   hydrALAZINE (APRESOLINE) injection 10 mg (10 mg Intravenous Given 11/5/24 1834)       ED Risk Strat Scores       Stroke Assessment       Row Name 11/05/24 1616             NIH Stroke Scale    Interval --      Level of Consciousness (1a.) 0      LOC Questions (1b.) 0      LOC Commands (1c.) 0       Best Gaze (2.) 0      Visual (3.) 0      Facial Palsy (4.) 0      Motor Arm, Left (5a.) 0      Motor Arm, Right (5b.) 0      Motor Leg, Left (6a.) 0      Motor Leg, Right (6b.) 0      Limb Ataxia (7.) 0      Sensory (8.) 0      Best Language (9.) 0      Dysarthria (10.) 0      Extinction and Inattention (11.) (Formerly Neglect) 0      Total 0                                      SBIRT 22yo+      Flowsheet Row Most Recent Value   Initial Alcohol Screen: US AUDIT-C     2. How many drinks containing alcohol do you have on a typical day you are drinking?  0 Filed at: 11/05/2024 3113   3a. Male UNDER 65: How often do you have five or more drinks on one occasion? 0 Filed at: 11/05/2024 7803   3b. FEMALE Any Age, or MALE 65+: How often do you have 4 or more drinks on one occassion? 0 Filed at: 11/05/2024 1558   Audit-C Score 0 Filed at: 11/05/2024 1559   ALETHEA: How many times in the past year have you...    Used an illegal drug or used a prescription medication for non-medical reasons? Never Filed at: 11/05/2024 155                            History of Present Illness       Chief Complaint   Patient presents with    Dizziness     Arrives with reports of dizziness that started this morning after waking up. Also reports confusion, GCS 15. Reports twitching in left arm. Ambulated from WR to room with cane.       Past Medical History:   Diagnosis Date    Acute kidney injury (HCC)     Ambulates with cane     Anuria     Anxiety     Cellulitis of right elbow 03/31/2021    Chronic kidney disease     Depression     Diabetes mellitus (HCC)     Diarrhea     Emesis 10/24/2020    End stage renal disease (HCC) 02/11/2018    Formatting of this note might be different from the original. Last Assessment & Plan:  Secondary to DM.  On nightly PD.  Followed by Nephro.  [facility] Formatting of this note might be different from the original. Last Assessment & Plan:  Formatting of this  note might be different from the original. 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    Esophagitis 07/21/2015    Falls     Gastroparesis     GERD (gastroesophageal reflux disease)     History of shingles 2010    History of transfusion 02/2018    no adverse reaction    Hyperlipidemia     Hyperphosphatemia     Hypertension     Hypoglycemia 07/15/2022    Itching     Mastoiditis of right side 07/15/2022    Muscle weakness     general unsteadiness    Obesity (BMI 30.0-34.9) 09/09/2019    Orthostatic hypotension 10/25/2020    Peripheral polyneuropathy 11/20/2019    PONV (postoperative nausea and vomiting) 01/26/2018    Protein-calorie malnutrition (HCC) 11/23/2020    Recurrent peritonitis (HCC) due to peritoneal dialysis catheter 07/31/2020    Retinopathy     Seizures (HCC)     early 2020 - one time    Skin abnormality     some dime size areas where skin was scratched from itching    Sleep apnea     Spontaneous bacterial peritonitis (HCC) 10/19/2020    Squamous cell skin cancer     left temple    Stroke (HCC)     x2 - off balance/no driving/fatigue    Swelling of both lower extremities     Traumatic onycholysis 07/21/2022    Vomiting     Wears glasses       Past Surgical History:   Procedure Laterality Date    CARDIAC ELECTROPHYSIOLOGY PROCEDURE N/A 9/21/2023    Procedure: Cardiac loop recorder explant;  Surgeon: Parish Morgan MD;  Location: [facility];  Service: Cardiology    CARDIAC LOOP RECORDER  05/2018    COLONOSCOPY      EGD      EYE SURGERY Right     IR AV FISTULAGRAM/GRAFTOGRAM  02/23/2021    IR CEREBRAL ANGIOGRAPHY  1/12/2024    IR CEREBRAL ANGIOGRAPHY / INTERVENTION  1/5/2024    IR TUNNELED CENTRAL LINE PLACEMENT  02/16/2021    IR TUNNELED DIALYSIS CATHETER PLACEMENT  11/18/2020    IR TUNNELED DIALYSIS CATHETER REMOVAL  02/12/2021    IR TUNNELED DIALYSIS CATHETER REMOVAL  03/11/2021    MOHS SURGERY Left 12/14/2022    Left temple with Dr. Mo BERRY  CATHETER INSERTION N/A 2018    Procedure: UNROOF PD CATHETER;  Surgeon: Felipe Lindo DO;  Location: [facility];  Service: General    MD ARTERIOVENOUS ANASTOMOSIS OPEN DIRECT Left 2020    Procedure: CREATION FISTULA  ARTERIOVENOUS (AV) - LEFT WRIST;  Surgeon: Placido Altamirano MD;  Location: [facility];  Service: Vascular    MD ESOPHAGOGASTRODUODENOSCOPY TRANSORAL DIAGNOSTIC N/A 2019    Procedure: ESOPHAGOGASTRODUODENOSCOPY (EGD);  Surgeon: Ale Figueroa MD;  Location: [facility];  Service: Gastroenterology    MD LAPS INSERTION TUNNELED INTRAPERITONEAL CATHETER N/A 2018    Procedure: LAPAROSCOPIC PD CATHETER PLACEMENT;  Surgeon: Felipe Lindo DO;  Location[facility];  Service: General    MD REMOVAL TUNNELED INTRAPERITONEAL CATHETER N/A 2020    Procedure: REMOVAL CATHETER PERITONEAL DIALYSIS;  Surgeon: Abdifatah Ty MD;  Location: [facility];  Service: General    TONSILLECTOMY      UPPER GASTROINTESTINAL ENDOSCOPY        Family History   Problem Relation Age of Onset    Breast cancer Mother     Hypertension Mother     Hyperlipidemia Father     Hypertension Father     Leukemia Maternal Grandmother     Hyperlipidemia Maternal Grandfather     Hypertension Maternal Grandfather     Hyperlipidemia Paternal Grandmother     Hypertension Paternal Grandmother     Heart disease Paternal Grandfather         cardiac disorder    Diabetes Paternal Grandfather       Social History     Tobacco Use    Smoking status: Former     Current packs/day: 0.00     Average packs/day: 0.5 packs/day for 12.0 years (6.0 ttl pk-yrs)     Types: Cigarettes     Start date: 2006     Quit date: 2018     Years since quittin.7    Smokeless tobacco: Never    Tobacco comments:     quit 2018   Vaping Use    Vaping status: Every Day    Substances: THC, CBD   Substance Use Topics    Alcohol use: Not Currently    Drug use: Yes     Types: Marijuana     Comment: medical marijuana last vaped 2024       E-Cigarette/Vaping    E-Cigarette Use Current Every Day User     Comments medical marijuana       E-Cigarette/Vaping Substances    Nicotine No     THC Yes     CBD Yes     Flavoring No     Other No     Unknown No       I have reviewed and agree with the history as documented.     39-year-old male with history of 3 past strokes senting to the ED with new onset dizziness.  Patient does have residual symptoms including dizziness from past strokes however he states that he woke up at 8 AM at approximately 9 AM his dizziness had significantly worsened.  Patient also endorses difficulty finding words although is answering questions appropriately.  Patient states that his last stroke that this was a very similar presentation where his residual dizziness got worse.  Also noted that patient had elevated blood pressure that was not changing with his home medications.  Patient's symptoms progressively worse which prompted him to come to the ER.  Patient is on a daily aspirin and no other thinners.  Patient otherwise denies chest pain, palpitations, shortness of breath, difficulty breathing, slurred speech, facial droop, nausea, vomiting.  Patient's wife is at bedside who also agrees that patient has not had signs of gait abnormality, slurred speech, facial droop.  Patient was able to walk into the ED.        Review of Systems   Constitutional:  Negative for chills and fever.   HENT:  Negative for congestion and rhinorrhea.    Respiratory:  Negative for chest tightness and shortness of breath.    Cardiovascular:  Negative for chest pain and palpitations.   Gastrointestinal:  Negative for abdominal pain, diarrhea, nausea and vomiting.   Genitourinary:  Negative for dysuria.   Musculoskeletal:  Negative for back pain, gait problem and neck stiffness.   Skin:  Negative for color change.   Neurological:  Positive for dizziness and weakness. Negative for tremors, seizures, syncope, facial asymmetry, speech difficulty,  light-headedness, numbness and headaches.   Psychiatric/Behavioral:  Positive for confusion. Negative for agitation. The patient is not nervous/anxious.            Objective       ED Triage Vitals [11/05/24 1557]   Temperature Pulse Blood Pressure Respirations SpO2 Patient Position - Orthostatic VS   98.6 °F (37 °C) 72 (!) 215/100 18 100 % Lying      Temp Source Heart Rate Source BP Location FiO2 (%) Pain Score    Oral Monitor Right arm -- No Pain      Vitals      Date and Time Temp Pulse SpO2 Resp BP Pain Score FACES Pain Rating User   11/05/24 2100 -- -- -- -- 176/84 -- --    11/05/24 2045 -- -- -- -- 181/84 -- --    11/05/24 2037 -- -- -- -- 168/91 -- --    11/05/24 2025 98.3 °F (36.8 °C) 86 100 % 18 184/88 No Pain --    11/05/24 2012 -- 81 -- -- 163/81 -- --    11/05/24 2011 98.4 °F (36.9 °C) 84 99 % 20 163/81 -- --    11/05/24 1959 -- 83 -- -- 172/80 -- --    11/05/24 1945 -- 81 99 % -- 170/81 -- --    11/05/24 1930 -- 81 -- 18 168/81 -- --    11/05/24 1915 -- 68 -- -- 208/100 -- --    11/05/24 1900 -- 67 100 % -- 208/97 -- --    11/05/24 1845 -- 70 99 % 20 225/107 -- --    11/05/24 1834 -- 64 99 % 20 216/105 -- --    11/05/24 1830 -- 67 100 % 20 216/105 -- --    11/05/24 1815 -- 68 99 % 20 200/95 -- --    11/05/24 1800 -- 67 98 % 20 219/102 -- --    11/05/24 1745 -- 65 98 % 20 215/105 -- --    11/05/24 1738 -- 64 98 % 20 219/104 -- -- SM   11/05/24 1730 -- 65 98 % 20 224/107 -- --    11/05/24 1715 -- 68 98 % 20 204/97 -- --    11/05/24 1700 -- 66 99 % 20 226/104 -- --    11/05/24 1645 -- 77 98 % 18 211/103 -- --    11/05/24 1630 -- 68 99 % 18 214/104 -- --    11/05/24 1615 -- 68 98 % 18 198/101 -- --    11/05/24 1557 98.6 °F (37 °C) 72 100 % 18 215/100 No Pain -- LR            Physical Exam  Constitutional:       Appearance: Normal appearance.   HENT:      Head: Normocephalic and atraumatic.      Right Ear: External ear normal.      Left Ear: External ear normal.       Nose: Nose normal.      Mouth/Throat:      Pharynx: Oropharynx is clear.   Eyes:      Extraocular Movements: Extraocular movements intact.      Pupils: Pupils are equal, round, and reactive to light.   Cardiovascular:      Rate and Rhythm: Normal rate.      Pulses: Normal pulses.      Heart sounds: Normal heart sounds.   Pulmonary:      Effort: Pulmonary effort is normal.      Breath sounds: Normal breath sounds.   Abdominal:      General: Bowel sounds are normal.      Palpations: Abdomen is soft.   Musculoskeletal:         General: Normal range of motion.      Cervical back: Normal range of motion.   Skin:     General: Skin is warm.      Capillary Refill: Capillary refill takes less than 2 seconds.   Neurological:      General: No focal deficit present.      Mental Status: He is alert and oriented to person, place, and time.      GCS: GCS eye subscore is 4. GCS verbal subscore is 5. GCS motor subscore is 6.      Cranial Nerves: Cranial nerves 2-12 are intact.      Sensory: Sensation is intact.      Motor: Motor function is intact.      Coordination: Coordination is intact.      Gait: Gait is intact.   Psychiatric:         Mood and Affect: Mood normal.         Behavior: Behavior normal.         Results Reviewed       Procedure Component Value Units Date/Time    TSH, 3rd generation with Free T4 reflex [[id number]]  (Abnormal) Collected: 11/05/24 2034    Lab Status: Final result Specimen: Blood from Arm, Right Updated: 11/05/24 2111     TSH 3RD GENERATON 5.256 uIU/mL     HS Troponin I 4hr [[id number]]  (Normal) Collected: 11/05/24 2034    Lab Status: Final result Specimen: Blood from Arm, Right Updated: 11/05/24 2103     hs TnI 4hr 24 ng/L      Delta 4hr hsTnI 1 ng/L     Magnesium [[id number]]  (Normal) Collected: 11/05/24 2034    Lab Status: Final result Specimen: Blood from Arm, Right Updated: 11/05/24 2055     Magnesium 2.5 mg/dL     Phosphorus [[id number]]  (Normal) Collected: 11/05/24 2034    Lab Status: Final  result Specimen: Blood from Arm, Right Updated: 11/05/24 2055     Phosphorus 3.6 mg/dL     Lipid panel [[id number]]  (Abnormal) Collected: 11/05/24 2034    Lab Status: Final result Specimen: Blood from Arm, Right Updated: 11/05/24 2055     Cholesterol 102 mg/dL      Triglycerides 101 mg/dL      HDL, Direct 35 mg/dL      LDL Calculated 47 mg/dL      Non-HDL-Chol (CHOL-HDL) 67 mg/dl     Calcium, ionized [[id number]]  (Abnormal) Collected: 11/05/24 2034    Lab Status: Final result Specimen: Blood from Arm, Right Updated: 11/05/24 2047     Calcium, Ionized 0.98 mmol/L     Platelet count [[id number]]  (Normal) Collected: 11/05/24 2034    Lab Status: Final result Specimen: Blood from Arm, Right Updated: 11/05/24 2041     Platelets 185 Thousands/uL      MPV 10.4 fL     Hemoglobin A1C [[id number]] Collected: 11/05/24 2034    Lab Status: In process Specimen: Blood from Arm, Right Updated: 11/05/24 2038    HS Troponin I 2hr [[id number]]  (Normal) Collected: 11/05/24 1810    Lab Status: Final result Specimen: Blood from Arm, Right Updated: 11/05/24 1850     hs TnI 2hr 22 ng/L      Delta 2hr hsTnI -1 ng/L     APTT [[id number]]  (Normal) Collected: 11/05/24 1810    Lab Status: Final result Specimen: Blood from Arm, Right Updated: 11/05/24 1836     PTT 32 seconds     Protime-INR [[id number]]  (Normal) Collected: 11/05/24 1810    Lab Status: Final result Specimen: Blood from Arm, Right Updated: 11/05/24 1836     Protime 14.1 seconds      INR 1.01    Narrative:      INR Therapeutic Range    Indication                                             INR Range      Atrial Fibrillation                                               2.0-3.0  Hypercoagulable State                                    2.0.2.3  Left Ventricular Asist Device                            2.0-3.0  Mechanical Heart Valve                                  -    Aortic(with afib, MI, embolism, HF, LA enlargement,    and/or coagulopathy)                                      2.0-3.0 (2.5-3.5)     Mitral                                                             2.5-3.5  Prosthetic/Bioprosthetic Heart Valve               2.0-3.0  Venous thromboembolism (VTE: VT, PE        2.0-3.0    HS Troponin 0hr (reflex protocol) [[id number]]  (Normal) Collected: 11/05/24 1630    Lab Status: Final result Specimen: Blood from Arm, Right Updated: 11/05/24 1707     hs TnI 0hr 23 ng/L     Comprehensive metabolic panel [[id number]]  (Abnormal) Collected: 11/05/24 1606    Lab Status: Final result Specimen: Blood from Arm, Right Updated: 11/05/24 1630     Sodium 142 mmol/L      Potassium 4.5 mmol/L      Chloride 100 mmol/L      CO2 35 mmol/L      ANION GAP 7 mmol/L      BUN 25 mg/dL      Creatinine 8.71 mg/dL      Glucose 176 mg/dL      Calcium 8.1 mg/dL      AST 20 U/L      ALT 13 U/L      Alkaline Phosphatase 105 U/L      Total Protein 6.1 g/dL      Albumin 3.9 g/dL      Total Bilirubin 0.59 mg/dL      eGFR 6 ml/min/1.73sq m     Narrative:      National Kid[employer] Disease Foundation guidelines for Chronic Kidney Disease (CKD):     Stage 1 with normal or high GFR (GFR > 90 mL/min/1.73 square meters)    Stage 2 Mild CKD (GFR = 60-89 mL/min/1.73 square meters)    Stage 3A Moderate CKD (GFR = 45-59 mL/min/1.73 square meters)    Stage 3B Moderate CKD (GFR = 30-44 mL/min/1.73 square meters)    Stage 4 Severe CKD (GFR = 15-29 mL/min/1.73 square meters)    Stage 5 End Stage CKD (GFR <15 mL/min/1.73 square meters)  Note: GFR calculation is accurate only with a steady state creatinine    Fingerstick Glucose (POCT) [[id number]]  (Abnormal) Collected: 11/05/24 1626    Lab Status: Final result Specimen: Blood Updated: 11/05/24 1627     POC Glucose 170 mg/dl     CBC and differential [[id number]]  (Abnormal) Collected: 11/05/24 1606    Lab Status: Final result Specimen: Blood from Arm, Right Updated: 11/05/24 1615     WBC 5.71 Thousand/uL      RBC 3.64 Million/uL      Hemoglobin 12.0 g/dL      Hematocrit 35.9 %      MCV 99 fL       MCH 33.0 pg      MCHC 33.4 g/dL      RDW 13.1 %      MPV 10.4 fL      Platelets 206 Thousands/uL      nRBC 0 /100 WBCs      Segmented % 61 %      Immature Grans % 0 %      Lymphocytes % 23 %      Monocytes % 9 %      Eosinophils Relative 6 %      Basophils Relative 1 %      Absolute Neutrophils 3.45 Thousands/µL      Absolute Immature Grans 0.01 Thousand/uL      Absolute Lymphocytes 1.31 Thousands/µL      Absolute Monocytes 0.53 Thousand/µL      Eosinophils Absolute 0.35 Thousand/µL      Basophils Absolute 0.06 Thousands/µL             CT stroke alert brain   Final Interpretation by Justin Ho MD (11/05 1647)      No acute intracranial abnormality.      Chronic left posterior cerebellar, left corona radiata/centrum semiovale and bilateral basal ganglia lacunar infarcts. Chronic microangiopathic ischemic changes.            Workstation performed: [id number]         CTA stroke alert (head/neck)   Final Interpretation by Justin Ho MD (11/05 8770)      1. CTA head: Negative for large vessel intracranial occlusion or hemodynamically significant stenosis.      2. CTA neck: Mild left extracranial ICA stenosis. The cervical vertebral arteries are patent.            Findings were communicated to Dr. Bryan Justice at 4:54 p.m.      Workstation performed: [id number]         MRI brain w wo contrast    (Results Pending)       ECG 12 Lead Documentation Only    Date/Time: 11/5/2024 4:08 PM    Performed by: Holden Singh MD  Authorized by: Holden Singh MD    Indications / Diagnosis:  Stroke  ECG reviewed by me, the ED Provider: yes    Patient location:  ED  Previous ECG:     Previous ECG:  Compared to current    Similarity:  No change  Interpretation:     Interpretation: normal    Rate:     ECG rate:  70    ECG rate assessment: normal    Rhythm:     Rhythm: sinus rhythm    Ectopy:     Ectopy: none    QRS:     QRS axis:  Normal    QRS intervals:  Normal  Conduction:     Conduction: normal     ST segments:     ST segments:  Normal  T waves:     T waves: normal        ED Medication and Procedure Management   Prior to Admission Medications   Prescriptions Last Dose Informant Patient Reported? Taking?   GLUCAGON EMERGENCY 1 MG injection  Self Yes No   Gvoke HypoPen 1-Pack 1 MG/0.2ML SOAJ  Self Yes No   Sig: as needed   Insulin Disposable Pump (Omnipod 5 G6 Intro, Gen 5,) KIT  Self Yes No   Insulin Disposable Pump (Omnipod 5 G6 Pod, Gen 5,) MISC  Self Yes No   NIFEdipine (PROCARDIA XL) 90 mg 24 hr tablet  Self Yes No   Sig: Take 90 mg by mouth daily   NovoLOG 100 UNIT/ML injection  Self Yes No   Patient not taking: Reported on 10/1/2024   Patiromer Sorbitex Calcium (VELTASSA PO)  Self Yes No   Sig: Take 5 g by mouth once a week   SPS 15 GM/60ML suspension  Self Yes No   Sig: TAKE 60 ML BY MOUTH SINGLE DOSE FOR EMERGENCY USE DURING MISSED HEMODIALYSIS   aspirin (ECOTRIN LOW STRENGTH) 81 mg EC tablet  Self Yes No   Sig: Take 81 mg by mouth daily   atorvastatin (LIPITOR) 40 mg tablet  Self No No   Sig: TAKE 1 TABLET BY MOUTH ONCE DAILY IN THE EVENING   b complex vitamins capsule  Self Yes No   Sig: Take 1 capsule by mouth daily before lunch    calcium acetate (PHOSLO) capsule  Self No No   Sig: Take 1 capsule (667 mg total) by mouth 3 (three) times a day   cinacalcet (SENSIPAR) 60 MG tablet  Self No No   Sig: Take 1 tablet (60 mg total) by mouth daily   escitalopram (LEXAPRO) 20 mg tablet   No No   Sig: Take 1 tablet by mouth once daily   famotidine (PEPCID) 40 MG tablet  Self No No   Sig: TAKE 1 TABLET BY MOUTH IN THE MORNING   gabapentin (NEURONTIN) 100 mg capsule   No No   Sig: TAKE 1 CAPSULE BY MOUTH IN THE MORNING AND 2 AT BEDTIME   hydrOXYzine HCL (ATARAX) 10 mg tablet  Self No No   Sig: Take 1 tablet (10 mg total) by mouth every 6 (six) hours as needed for itching or anxiety   labetalol (NORMODYNE) 200 mg tablet  Self No No   Sig: Take 2 tablets (400 mg total) by mouth 3 (three) times a day   Patient  taking differently: Take 400 mg by mouth 3 (three) times a day 200mg in AM, 400mg at lunch and dinner   metoclopramide (REGLAN) 5 mg tablet  Self No No   Sig: Take 1 tablet by mouth three times daily as needed   mupirocin (BACTROBAN) 2 % ointment  Self No No   Sig: Apply topically 2 (two) times a day   olmesartan (BENICAR) 40 mg tablet  Self Yes No   Sig: Take 40 mg by mouth daily   ondansetron (ZOFRAN) 4 mg tablet  Self No No   Sig: Take 1 tablet (4 mg total) by mouth every 8 (eight) hours as needed for nausea or vomiting   saccharomyces boulardii (FLORASTOR) 250 mg capsule  Self Yes No   Sig: Take 250 mg by mouth daily   Patient not taking: Reported on 10/1/2024   traZODone (DESYREL) 50 mg tablet   No No   Sig: TAKE 1/2 (ONE-HALF) TABLET BY MOUTH ONCE DAILY AT BEDTIME AS NEEDED FOR SLEEP      Facility-Administered Medications: None     Current Discharge Medication List        CONTINUE these medications which have NOT CHANGED    Details   aspirin (ECOTRIN LOW STRENGTH) 81 mg EC tablet Take 81 mg by mouth daily      atorvastatin (LIPITOR) 40 mg tablet TAKE 1 TABLET BY MOUTH ONCE DAILY IN THE EVENING  Qty: 90 tablet, Refills: 1    Associated Diagnoses: Blurred vision; Cerebrovascular accident (CVA), unspecified mechanism ([facility]); Optic neuritis due to multiple sclerosis ([facility]); Hypertensive urgency; Acute renal failure with acute tubular necrosis superimposed on stage 2 chronic kidney disease  ([facility]); Acute renal failure with acute tubular necrosis superimposed on stage 3 chronic kidney disease ([facility]); Azotemia; Hyperphosphatemia; Persistent proteinuria; Vitamin D deficiency; Cerebrovascular accident (CVA) of left pontine structure ([facility]); Type 1 diabetes mellitus with diabetic nephropathy, with long-term current use of insulin ([facility]); History of lacunar cerebrovascular accident (CVA); Nausea; 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; Hyperhomocysteinemia ([facility]); Binocular visual disturbance      b complex  vitamins capsule Take 1 capsule by mouth daily before lunch       calcium acetate (PHOSLO) capsule Take 1 capsule (667 mg total) by mouth 3 (three) times a day  Qty: 90 capsule, Refills: 0    Associated Diagnoses: End stage kidney disease (HCC)      cinacalcet (SENSIPAR) 60 MG tablet Take 1 tablet (60 mg total) by mouth daily  Qty: 30 tablet, Refills: 0    Associated Diagnoses: End stage kidney disease (HCC)      escitalopram (LEXAPRO) 20 mg tablet Take 1 tablet by mouth once daily  Qty: 90 tablet, Refills: 0    Associated Diagnoses: Moderate episode of recurrent major depressive disorder (HCC)      famotidine (PEPCID) 40 MG tablet TAKE 1 TABLET BY MOUTH IN THE MORNING  Qty: 90 tablet, Refills: 1    Associated Diagnoses: Gastroesophageal reflux disease without esophagitis      gabapentin (NEURONTIN) 100 mg capsule TAKE 1 CAPSULE BY MOUTH IN THE MORNING AND 2 AT BEDTIME  Qty: 90 capsule, Refills: 5    Associated Diagnoses: Diabetic polyneuropathy associated with type 1 diabetes mellitus (HCC); Right upper extremity numbness      GLUCAGON EMERGENCY 1 MG injection Refills: 3      Gvoke HypoPen 1-Pack 1 MG/0.2ML SOAJ as needed      hydrOXYzine HCL (ATARAX) 10 mg tablet Take 1 tablet (10 mg total) by mouth every 6 (six) hours as needed for itching or anxiety  Qty: 30 tablet, Refills: 3    Associated Diagnoses: Mild episode of recurrent major depressive disorder (HCC); Generalized anxiety disorder; Pruritus      Insulin Disposable Pump (Omnipod 5 G6 Intro, Gen 5,) KIT       Insulin Disposable Pump (Omnipod 5 G6 Pod, Gen 5,) MISC       labetalol (NORMODYNE) 200 mg tablet Take 2 tablets (400 mg total) by mouth 3 (three) times a day  Qty: 180 tablet, Refills: 0    Associated Diagnoses: Essential (primary) hypertension      metoclopramide (REGLAN) 5 mg tablet Take 1 tablet by mouth three times daily as needed  Qty: 90 tablet, Refills: 0    Associated Diagnoses: Gastroparesis      mupirocin (BACTROBAN) 2 % ointment Apply  topically 2 (two) times a day  Qty: 22 g, Refills: 0    Associated Diagnoses: Abrasion of right foot, initial encounter      NIFEdipine (PROCARDIA XL) 90 mg 24 hr tablet Take 90 mg by mouth daily      NovoLOG 100 UNIT/ML injection       olmesartan (BENICAR) 40 mg tablet Take 40 mg by mouth daily      ondansetron (ZOFRAN) 4 mg tablet Take 1 tablet (4 mg total) by mouth every 8 (eight) hours as needed for nausea or vomiting  Qty: 90 tablet, Refills: 1    Associated Diagnoses: Vertigo      Patiromer Sorbitex Calcium (VELTASSA PO) Take 5 g by mouth once a week      saccharomyces boulardii (FLORASTOR) 250 mg capsule Take 250 mg by mouth daily      SPS 15 GM/60ML suspension TAKE 60 ML BY MOUTH SINGLE DOSE FOR EMERGENCY USE DURING MISSED HEMODIALYSIS      traZODone (DESYREL) 50 mg tablet TAKE 1/2 (ONE-HALF) TABLET BY MOUTH ONCE DAILY AT BEDTIME AS NEEDED FOR SLEEP  Qty: 30 tablet, Refills: 0    Associated Diagnoses: Mild episode of recurrent major depressive disorder (HCC); Generalized anxiety disorder           No discharge procedures on file.  ED SEPSIS DOCUMENTATION   Time reflects when diagnosis was documented in both MDM as applicable and the Disposition within this note       Time User Action Codes Description Comment    11/5/2024  4:26 PM Holden Singh [R42] Dizziness     11/5/2024  7:34 PM Holden Singh [R25.1] Tremor     11/5/2024  8:11 PM Aime Gee [N18.6,  Z99.2] ESRD on hemodialysis (HCC)                  Holden Singh MD  11/05/24 0313

## 2024-11-05 NOTE — CONSULTS
Consultation - Neurology   Name: Mateus Mckeon 39 y.o. male I MRN: [id number]  Unit/Bed#: ED-43 I Date of Admission: 11/5/2024   Date of Service: 11/5/2024 I Hospital Day: 0   Consult to Neurology  Consult performed by: Arielle Zepeda PA-C  Consult ordered by: Sandhya Burrell MD      Physician Requesting Evaluation: Sandhya Burrell MD   Reason for Evaluation / Principal Problem: Stroke alert, dizziness and word finding difficulties    Assessment & Plan  Stroke-like symptoms  Mateus Mckeon is a 39 y.o. 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    - BP on presentation: Elevated 215/100  - NIHSS: 3 for right lower quadrant visual deficit, right upper extremity sensory deficit, and right upper extremity ataxia    Workup:  - CT head:   Unremarkable for acute intracranial abnormalities  Chronic left posterior cerebellar, left corona radiata/centrum semiovale, bilateral basal ganglia lacunar infarcts noted  - CTA head and neck:  Unremarkable for large vessel occlusion or critical stenosis  Mild left extracranial ICA stenosis noted    Thrombolytic Decision: Patient not a candidate. Unclear time of onset outside appropriate time window.    Plan to place on stroke pathway to rule out acute infarct.  Other considerations include recrudescence of prior stroke symptoms given evidence of myoclonic jerks noted on exam and reported dizziness and word finding difficulties feeling similar to prior stroke symptoms.  Recommend workup for toxic/metabolic etiologies.    Plan:  Admit on stroke pathway  - MRI brain when able    - Echo  - Recommend: Fasting lipid panel, Hemoglobin A1c, TSH  - Antiplatelet load:  mg x 1 (patient reports taking home ASA 81 mg at noon today 11/5) and Plavix 300 mg x 1  - Plan to continue ASA 81 mg daily and Plavix 75 mg daily starting 11/6/2024  - Continue home atorvastatin 40 mg daily  - Systolic BP <180 given history of spontaneous basilar cistern subarachnoid hemorrhage  If MRI brain is negative, recommend normotension    - Euglycemia, Normothermia   - Telemetry  - Frequent neuro checks  - PT/OT/Speech   - Stroke Education   - STAT CT head for any acute change in neuro exam  - Medical management and supportive care per primary team, notify with changes  - Correction of any metabolic or infectious disturbances    Recommendations for outpatient neurological follow up have yet to be determined.    History of Present Illness   Hx and PE limited by: N/A  Patient last known well: 10 PM on 11/4/2024  Stroke alert called: 4:29 PM on 11/5/2024  Neurology time of arrival: Attending neurologist responded immediately to the phone call  HPI: Mateus Mckeon is a 39 y.o.  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    History obtained per discussion with patient and mom at bedside.  Patient reports that he went to bed last night 11/4/2024 at approximately 10 PM, reporting this was his last known normal.  Patient woke up at approximately 7:30 AM this morning 11/5/2024 with dizziness, similar to prior dizziness from history of strokes.  Patient reports at approximately 8:30 AM when he began to talk to his mom,  noticed that he was having word finding difficulties.  He reports that he also had word finding difficulties with prior strokes as well.  At baseline patient also has sensory deficits and weakness in right upper extremity from prior stroke as well as right lower quadrant visual deficit from prior stroke.    Patient's mother at bedside reports that patient has had difficulties controlling his blood pressures.  She states that since January 2024 patient has had an issue with low blood pressures and since then his family doctor has been weaning him off of the hypertensive medications to get his blood pressure normal.  Patient's mother denies any changes to his antihypertensives over the past month.  Patient is on ASA 81 mg daily for history of strokes and states that he took his aspirin today 11/5 around noon.    Patient presented to [facility] ED on 11/5/2024 as a stroke alert, BP elevated on presentation 215/100.  CT head unremarkable for acute intra can abnormalities, demonstrating chronic infarcts noted above.  CTA head and neck unremarkable for large vessel occlusion or critical stenosis.  Decision was made to load with  mg x 1 and Plavix 300 mg x 1 and admit on stroke pathway.  Plan communicated with the ED providers.    Review of Systems  12 point ROS limited to acuity of condition    Objective :  Temp:  [98.6 °F (37 °C)] 98.6 °F (37 °C)  HR:  [68-77] 77  BP: (198-215)/(100-104) 211/103  Resp:  [18] 18  SpO2:  [98 %-100 %] 98 %  O2 Device: None (Room air)    Physical Exam  Vitals and nursing note reviewed.   Constitutional:       General: He is not in acute distress.     Appearance: Normal appearance. He is not ill-appearing, toxic-appearing or diaphoretic.   HENT:      Head: Normocephalic and atraumatic.   Eyes:      General: No scleral icterus.        Right eye: No discharge.         Left eye: No discharge.      Conjunctiva/sclera: Conjunctivae normal.   Musculoskeletal:         General: Normal range of  motion.   Skin:     General: Skin is warm and dry.      Coloration: Skin is not jaundiced or pale.   Neurological:      Mental Status: He is alert.   Psychiatric:         Mood and Affect: Mood normal.         Behavior: Behavior normal.         Thought Content: Thought content normal.         Judgment: Judgment normal.       Neurological Exam  Mental Status  Alert.  Patient is alert, lying in bed, accompanied by mother  Oriented to person, place, month, and year  Able to name all objects provided  Follows central and appendicular commands  Answers all questions appropriately  No dysarthria or aphasia noted.    Cranial Nerves  Primary gaze midline, conjugate gaze noted  EOMs intact, no evidence of nystagmus  Right lower quadrant visual field deficit noted  Facial sensation intact to light touch and pinprick  No facial asymmetry noted  Tongue midline, no lacerations.    Motor    No drift noted in bilateral upper and lower extremities.    Sensory  Diminished sensation to light touch and pinprick in right upper extremity, otherwise sensation to light touch and pinprick intact throughout  No evidence of extinction with bilateral simultaneous stimulation.    Reflexes    Myoclonic jerks noted in LUE> RUE on exam    No rhythmic seizure-like activity noted throughout exam.    Coordination    Mild ataxia/clumsiness noted in right upper extremity with finger-nose testing    Tremulousness noted in left upper extremity with finger-nose testing, however no obvious ataxia    No ataxia noted in BLE heel-to-shin testing.    NIHSS:  1a.Level of Consciousness: 0 = Alert   1b. LOC Questions: 0 = Answers both correctly   1c. LOC Commands: 0 = Obeys both correctly   2. Best Gaze: 0 = Normal   3. Visual: 1 = Partial hemianopia   4. Facial Palsy: 0=Normal symmetric movement   5a. Motor Right Arm: 0=No drift, limb holds 90 (or 45) degrees for full 10 seconds   5b. Motor Left Arm: 0=No drift, limb holds 90 (or 45) degrees for full 10 seconds    6a. Motor Right Le=No drift, limb holds 90 (or 45) degrees for full 10 seconds   6b. Motor Left Le=No drift, limb holds 90 (or 45) degrees for full 10 seconds   7. Limb Ataxia:  1=Present in one limb   8. Sensory: 1=Mild to moderate sensory loss; patient feels pinprick is less sharp or is dull on the affected side; there is a loss of superficial pain with pinprick but patient is aware He is being touched   9. Best Language:  0=No aphasia, normal   10. Dysarthria: 0=Normal articulation   11. Extinction and Inattention (formerly Neglect): 0=No abnormality   Total Score: 3     Time NIHSS was completed: 1645    Modified [place] Score:  Unable to determine currently, will gather additional data      Lab Results: I have personally reviewed pertinent reports.  Recent Results (from the past 24 hour(s))   CBC and differential    Collection Time: 24  4:06 PM   Result Value Ref Range    WBC 5.71 4.31 - 10.16 Thousand/uL    RBC 3.64 (L) 3.88 - 5.62 Million/uL    Hemoglobin 12.0 12.0 - 17.0 g/dL    Hematocrit 35.9 (L) 36.5 - 49.3 %    MCV 99 (H) 82 - 98 fL    MCH 33.0 26.8 - 34.3 pg    MCHC 33.4 31.4 - 37.4 g/dL    RDW 13.1 11.6 - 15.1 %    MPV 10.4 8.9 - 12.7 fL    Platelets 206 149 - 390 Thousands/uL    nRBC 0 /100 WBCs    Segmented % 61 43 - 75 %    Immature Grans % 0 0 - 2 %    Lymphocytes % 23 14 - 44 %    Monocytes % 9 4 - 12 %    Eosinophils Relative 6 0 - 6 %    Basophils Relative 1 0 - 1 %    Absolute Neutrophils 3.45 1.85 - 7.62 Thousands/µL    Absolute Immature Grans 0.01 0.00 - 0.20 Thousand/uL    Absolute Lymphocytes 1.31 0.60 - 4.47 Thousands/µL    Absolute Monocytes 0.53 0.17 - 1.22 Thousand/µL    Eosinophils Absolute 0.35 0.00 - 0.61 Thousand/µL    Basophils Absolute 0.06 0.00 - 0.10 Thousands/µL   Comprehensive metabolic panel    Collection Time: 24  4:06 PM   Result Value Ref Range    Sodium 142 135 - 147 mmol/L    Potassium 4.5 3.5 - 5.3 mmol/L    Chloride 100 96 - 108 mmol/L    CO2 35 (H)  "21 - 32 mmol/L    ANION GAP 7 4 - 13 mmol/L    BUN 25 5 - 25 mg/dL    Creatinine 8.71 (H) 0.60 - 1.30 mg/dL    Glucose 176 (H) 65 - 140 mg/dL    Calcium 8.1 (L) 8.4 - 10.2 mg/dL    AST 20 13 - 39 U/L    ALT 13 7 - 52 U/L    Alkaline Phosphatase 105 (H) 34 - 104 U/L    Total Protein 6.1 (L) 6.4 - 8.4 g/dL    Albumin 3.9 3.5 - 5.0 g/dL    Total Bilirubin 0.59 0.20 - 1.00 mg/dL    eGFR 6 ml/min/1.73sq m   Fingerstick Glucose (POCT)    Collection Time: 11/05/24  4:26 PM   Result Value Ref Range    POC Glucose 170 (H) 65 - 140 mg/dl   HS Troponin 0hr (reflex protocol)    Collection Time: 11/05/24  4:30 PM   Result Value Ref Range    hs TnI 0hr 23 \"Refer to ACS Flowchart\"- see link ng/L   Imaging Studies: I have personally reviewed pertinent reports and I have personally reviewed pertinent films in PACS.    EKG, Pathology, and Other Studies: I have personally reviewed pertinent reports.    VTE Prophylaxis: Patient in the ED, will be placed on DVT prophylaxis once admitted to the floor    Code Status: Prior    Counseling / Coordination of Care  Total time spent today 45 minutes critical care time.  Greater than 50% of total time was spent with the patient and/or family counseling and/or coordination of care.  A description of the counseling/coordination of care:  Patient was seen and evaluated.  Discussed with attending.  Chart reviewed thoroughly including laboratory and imaging studies.  Plan of care discussed with patient and primary team.  Discussed plan to load with aspirin and Plavix with neurology attending as well as ED providers.  Discussed CT imaging and plan to obtain MRI brain with patient and mother at bedside.    Dictation voice to text software has been used in the creation of this document.  Please consider this in light of any contextual or grammatical errors.   "

## 2024-11-06 ENCOUNTER — APPOINTMENT (INPATIENT)
Dept: MRI IMAGING | Facility: HOSPITAL | Age: 39
DRG: 304 | End: 2024-11-06
Payer: MEDICARE

## 2024-11-06 ENCOUNTER — APPOINTMENT (INPATIENT)
Dept: NON INVASIVE DIAGNOSTICS | Facility: HOSPITAL | Age: 39
DRG: 304 | End: 2024-11-06
Payer: MEDICARE

## 2024-11-06 ENCOUNTER — APPOINTMENT (INPATIENT)
Dept: DIALYSIS | Facility: HOSPITAL | Age: 39
DRG: 304 | End: 2024-11-06
Payer: MEDICARE

## 2024-11-06 ENCOUNTER — APPOINTMENT (INPATIENT)
Dept: RADIOLOGY | Facility: HOSPITAL | Age: 39
DRG: 304 | End: 2024-11-06
Payer: MEDICARE

## 2024-11-06 PROBLEM — M89.9 CHRONIC KIDNEY DISEASE-MINERAL AND BONE DISORDER: Status: ACTIVE | Noted: 2024-11-06

## 2024-11-06 PROBLEM — N18.6 ANEMIA IN ESRD (END-STAGE RENAL DISEASE)  (HCC): Status: ACTIVE | Noted: 2022-01-06

## 2024-11-06 PROBLEM — E83.9 CHRONIC KIDNEY DISEASE-MINERAL AND BONE DISORDER: Status: ACTIVE | Noted: 2024-11-06

## 2024-11-06 PROBLEM — N18.9 CHRONIC KIDNEY DISEASE-MINERAL AND BONE DISORDER: Status: ACTIVE | Noted: 2024-11-06

## 2024-11-06 LAB
2HR DELTA HS TROPONIN: -3 NG/L
4HR DELTA HS TROPONIN: -2 NG/L
ALBUMIN SERPL BCG-MCNC: 3.6 G/DL (ref 3.5–5)
ALBUMIN SERPL BCG-MCNC: 4.3 G/DL (ref 3.5–5)
ALP SERPL-CCNC: 117 U/L (ref 34–104)
ALP SERPL-CCNC: 98 U/L (ref 34–104)
ALT SERPL W P-5'-P-CCNC: 11 U/L (ref 7–52)
ALT SERPL W P-5'-P-CCNC: 14 U/L (ref 7–52)
AMMONIA PLAS-SCNC: 19 UMOL/L (ref 18–72)
ANION GAP SERPL CALCULATED.3IONS-SCNC: 11 MMOL/L (ref 4–13)
ANION GAP SERPL CALCULATED.3IONS-SCNC: 13 MMOL/L (ref 4–13)
AORTIC ROOT: 3.2 CM
AORTIC VALVE MEAN VELOCITY: 12.7 M/S
APICAL FOUR CHAMBER EJECTION FRACTION: 68 %
ARTERIAL PATENCY WRIST A: NO
ASCENDING AORTA: 3.4 CM
AST SERPL W P-5'-P-CCNC: 15 U/L (ref 13–39)
AST SERPL W P-5'-P-CCNC: 23 U/L (ref 13–39)
ATRIAL RATE: 70 BPM
AV AREA BY CONTINUOUS VTI: 2.7 CM2
AV AREA PEAK VELOCITY: 2.2 CM2
AV LVOT MEAN GRADIENT: 4 MMHG
AV LVOT PEAK GRADIENT: 8 MMHG
AV MEAN GRADIENT: 8 MMHG
AV PEAK GRADIENT: 16 MMHG
AV VALVE AREA: 2.71 CM2
AV VELOCITY RATIO: 0.7
BASE EX.OXY STD BLDV CALC-SCNC: 65.7 % (ref 60–80)
BASE EXCESS BLDV CALC-SCNC: 3.3 MMOL/L
BASOPHILS # BLD AUTO: 0.03 THOUSANDS/ÂΜL (ref 0–0.1)
BASOPHILS # BLD AUTO: 0.07 THOUSANDS/ÂΜL (ref 0–0.1)
BASOPHILS NFR BLD AUTO: 1 % (ref 0–1)
BASOPHILS NFR BLD AUTO: 1 % (ref 0–1)
BILIRUB DIRECT SERPL-MCNC: 0.08 MG/DL (ref 0–0.2)
BILIRUB SERPL-MCNC: 0.55 MG/DL (ref 0.2–1)
BILIRUB SERPL-MCNC: 0.81 MG/DL (ref 0.2–1)
BSA FOR ECHO PROCEDURE: 2.15 M2
BUN SERPL-MCNC: 10 MG/DL (ref 5–25)
BUN SERPL-MCNC: 27 MG/DL (ref 5–25)
CA-I BLD-SCNC: 1 MMOL/L (ref 1.12–1.32)
CA-I BLD-SCNC: 1.02 MMOL/L (ref 1.12–1.32)
CALCIUM SERPL-MCNC: 8.4 MG/DL (ref 8.4–10.2)
CALCIUM SERPL-MCNC: 9 MG/DL (ref 8.4–10.2)
CARDIAC TROPONIN I PNL SERPL HS: 25 NG/L
CARDIAC TROPONIN I PNL SERPL HS: 26 NG/L
CARDIAC TROPONIN I PNL SERPL HS: 28 NG/L
CHLORIDE SERPL-SCNC: 96 MMOL/L (ref 96–108)
CHLORIDE SERPL-SCNC: 97 MMOL/L (ref 96–108)
CO2 SERPL-SCNC: 28 MMOL/L (ref 21–32)
CO2 SERPL-SCNC: 31 MMOL/L (ref 21–32)
CREAT SERPL-MCNC: 4.23 MG/DL (ref 0.6–1.3)
CREAT SERPL-MCNC: 9.75 MG/DL (ref 0.6–1.3)
DOP CALC AO PEAK VEL: 1.97 M/S
DOP CALC AO VTI: 37.3 CM
DOP CALC LVOT AREA: 3.14 CM2
DOP CALC LVOT CARDIAC INDEX: 4.01 L/MIN/M2
DOP CALC LVOT CARDIAC OUTPUT: 8.61 L/MIN
DOP CALC LVOT DIAMETER: 2 CM
DOP CALC LVOT PEAK VEL VTI: 32.2 CM
DOP CALC LVOT PEAK VEL: 1.38 M/S
DOP CALC LVOT STROKE INDEX: 48.4 ML/M2
DOP CALC LVOT STROKE VOLUME: 101.11
E WAVE DECELERATION TIME: 171 MS
E/A RATIO: 0.86
EOSINOPHIL # BLD AUTO: 0.33 THOUSAND/ÂΜL (ref 0–0.61)
EOSINOPHIL # BLD AUTO: 0.34 THOUSAND/ÂΜL (ref 0–0.61)
EOSINOPHIL NFR BLD AUTO: 5 % (ref 0–6)
EOSINOPHIL NFR BLD AUTO: 6 % (ref 0–6)
ERYTHROCYTE [DISTWIDTH] IN BLOOD BY AUTOMATED COUNT: 13.1 % (ref 11.6–15.1)
ERYTHROCYTE [DISTWIDTH] IN BLOOD BY AUTOMATED COUNT: 13.3 % (ref 11.6–15.1)
FRACTIONAL SHORTENING: 35 (ref 28–44)
GFR SERPL CREATININE-BSD FRML MDRD: 16 ML/MIN/1.73SQ M
GFR SERPL CREATININE-BSD FRML MDRD: 5 ML/MIN/1.73SQ M
GLUCOSE SERPL-MCNC: 120 MG/DL (ref 65–140)
GLUCOSE SERPL-MCNC: 138 MG/DL (ref 65–140)
GLUCOSE SERPL-MCNC: 145 MG/DL (ref 65–140)
GLUCOSE SERPL-MCNC: 154 MG/DL (ref 65–140)
GLUCOSE SERPL-MCNC: 186 MG/DL (ref 65–140)
HCO3 BLDV-SCNC: 27.9 MMOL/L (ref 24–30)
HCT VFR BLD AUTO: 32.9 % (ref 36.5–49.3)
HCT VFR BLD AUTO: 37.8 % (ref 36.5–49.3)
HGB BLD-MCNC: 11 G/DL (ref 12–17)
HGB BLD-MCNC: 13 G/DL (ref 12–17)
IMM GRANULOCYTES # BLD AUTO: 0.02 THOUSAND/UL (ref 0–0.2)
IMM GRANULOCYTES # BLD AUTO: 0.03 THOUSAND/UL (ref 0–0.2)
IMM GRANULOCYTES NFR BLD AUTO: 0 % (ref 0–2)
IMM GRANULOCYTES NFR BLD AUTO: 1 % (ref 0–2)
INR PPP: 0.99 (ref 0.85–1.19)
INTERVENTRICULAR SEPTUM IN DIASTOLE (PARASTERNAL SHORT AXIS VIEW): 1.1 CM
INTERVENTRICULAR SEPTUM: 1.1 CM (ref 0.6–1.1)
LAAS-AP2: 25.5 CM2
LAAS-AP4: 23.4 CM2
LACTATE SERPL-SCNC: 1.4 MMOL/L (ref 0.5–2)
LEFT ATRIUM AREA SYSTOLE SINGLE PLANE A4C: 24.5 CM2
LEFT ATRIUM SIZE: 5.1 CM
LEFT ATRIUM VOLUME (MOD BIPLANE): 84 ML
LEFT ATRIUM VOLUME INDEX (MOD BIPLANE): 39.1 ML/M2
LEFT INTERNAL DIMENSION IN SYSTOLE: 2.6 CM (ref 2.1–4)
LEFT VENTRICULAR INTERNAL DIMENSION IN DIASTOLE: 4 CM (ref 3.5–6)
LEFT VENTRICULAR POSTERIOR WALL IN END DIASTOLE: 1.1 CM
LEFT VENTRICULAR STROKE VOLUME: 45 ML
LVSV (TEICH): 45 ML
LYMPHOCYTES # BLD AUTO: 1.26 THOUSANDS/ÂΜL (ref 0.6–4.47)
LYMPHOCYTES # BLD AUTO: 1.48 THOUSANDS/ÂΜL (ref 0.6–4.47)
LYMPHOCYTES NFR BLD AUTO: 23 % (ref 14–44)
LYMPHOCYTES NFR BLD AUTO: 24 % (ref 14–44)
MAGNESIUM SERPL-MCNC: 2 MG/DL (ref 1.9–2.7)
MAGNESIUM SERPL-MCNC: 2.4 MG/DL (ref 1.9–2.7)
MCH RBC QN AUTO: 32.7 PG (ref 26.8–34.3)
MCH RBC QN AUTO: 32.7 PG (ref 26.8–34.3)
MCHC RBC AUTO-ENTMCNC: 33.4 G/DL (ref 31.4–37.4)
MCHC RBC AUTO-ENTMCNC: 34.4 G/DL (ref 31.4–37.4)
MCV RBC AUTO: 95 FL (ref 82–98)
MCV RBC AUTO: 98 FL (ref 82–98)
MONOCYTES # BLD AUTO: 0.46 THOUSAND/ÂΜL (ref 0.17–1.22)
MONOCYTES # BLD AUTO: 0.56 THOUSAND/ÂΜL (ref 0.17–1.22)
MONOCYTES NFR BLD AUTO: 9 % (ref 4–12)
MONOCYTES NFR BLD AUTO: 9 % (ref 4–12)
MV E'TISSUE VEL-SEP: 8 CM/S
MV PEAK A VEL: 1.22 M/S
MV PEAK E VEL: 105 CM/S
MV STENOSIS PRESSURE HALF TIME: 50 MS
MV VALVE AREA P 1/2 METHOD: 4.4
NEUTROPHILS # BLD AUTO: 3.06 THOUSANDS/ÂΜL (ref 1.85–7.62)
NEUTROPHILS # BLD AUTO: 3.97 THOUSANDS/ÂΜL (ref 1.85–7.62)
NEUTS SEG NFR BLD AUTO: 60 % (ref 43–75)
NEUTS SEG NFR BLD AUTO: 61 % (ref 43–75)
NRBC BLD AUTO-RTO: 0 /100 WBCS
NRBC BLD AUTO-RTO: 0 /100 WBCS
O2 CT BLDV-SCNC: 12.4 ML/DL
P AXIS: 59 DEGREES
PCO2 BLDV: 42.4 MM HG (ref 42–50)
PH BLDV: 7.44 [PH] (ref 7.3–7.4)
PHOSPHATE SERPL-MCNC: 1.4 MG/DL (ref 2.7–4.5)
PHOSPHATE SERPL-MCNC: 4 MG/DL (ref 2.7–4.5)
PLATELET # BLD AUTO: 179 THOUSANDS/UL (ref 149–390)
PLATELET # BLD AUTO: 228 THOUSANDS/UL (ref 149–390)
PMV BLD AUTO: 10.4 FL (ref 8.9–12.7)
PMV BLD AUTO: 10.5 FL (ref 8.9–12.7)
PO2 BLDV: 34 MM HG (ref 35–45)
POTASSIUM SERPL-SCNC: 3.8 MMOL/L (ref 3.5–5.3)
POTASSIUM SERPL-SCNC: 4.3 MMOL/L (ref 3.5–5.3)
PR INTERVAL: 176 MS
PROLACTIN SERPL-MCNC: 16.88 NG/ML
PROT SERPL-MCNC: 5.6 G/DL (ref 6.4–8.4)
PROT SERPL-MCNC: 7 G/DL (ref 6.4–8.4)
PROTHROMBIN TIME: 13.8 SECONDS (ref 12.3–15)
QRS AXIS: 52 DEGREES
QRSD INTERVAL: 72 MS
QT INTERVAL: 462 MS
QTC INTERVAL: 498 MS
RA PRESSURE ESTIMATED: 5 MMHG
RBC # BLD AUTO: 3.36 MILLION/UL (ref 3.88–5.62)
RBC # BLD AUTO: 3.98 MILLION/UL (ref 3.88–5.62)
RIGHT ATRIUM AREA SYSTOLE A4C: 17.8 CM2
RIGHT VENTRICLE ID DIMENSION: 4 CM
SL CV LEFT ATRIUM LENGTH A2C: 5.7 CM
SL CV LV EF: 65
SL CV PED ECHO LEFT VENTRICLE DIASTOLIC VOLUME (MOD BIPLANE) 2D: 71 ML
SL CV PED ECHO LEFT VENTRICLE SYSTOLIC VOLUME (MOD BIPLANE) 2D: 26 ML
SODIUM SERPL-SCNC: 137 MMOL/L (ref 135–147)
SODIUM SERPL-SCNC: 139 MMOL/L (ref 135–147)
T WAVE AXIS: 7 DEGREES
T4 FREE SERPL-MCNC: 0.89 NG/DL (ref 0.61–1.12)
TRICUSPID ANNULAR PLANE SYSTOLIC EXCURSION: 2.9 CM
VENTRICULAR RATE: 70 BPM
WBC # BLD AUTO: 5.16 THOUSAND/UL (ref 4.31–10.16)
WBC # BLD AUTO: 6.45 THOUSAND/UL (ref 4.31–10.16)

## 2024-11-06 PROCEDURE — 83605 ASSAY OF LACTIC ACID: CPT

## 2024-11-06 PROCEDURE — 84100 ASSAY OF PHOSPHORUS: CPT

## 2024-11-06 PROCEDURE — 70551 MRI BRAIN STEM W/O DYE: CPT

## 2024-11-06 PROCEDURE — 80076 HEPATIC FUNCTION PANEL: CPT

## 2024-11-06 PROCEDURE — 87081 CULTURE SCREEN ONLY: CPT

## 2024-11-06 PROCEDURE — 82140 ASSAY OF AMMONIA: CPT

## 2024-11-06 PROCEDURE — 99232 SBSQ HOSP IP/OBS MODERATE 35: CPT | Performed by: INTERNAL MEDICINE

## 2024-11-06 PROCEDURE — 80048 BASIC METABOLIC PNL TOTAL CA: CPT

## 2024-11-06 PROCEDURE — 84146 ASSAY OF PROLACTIN: CPT

## 2024-11-06 PROCEDURE — 82330 ASSAY OF CALCIUM: CPT

## 2024-11-06 PROCEDURE — 5A1D70Z PERFORMANCE OF URINARY FILTRATION, INTERMITTENT, LESS THAN 6 HOURS PER DAY: ICD-10-PCS | Performed by: INTERNAL MEDICINE

## 2024-11-06 PROCEDURE — 83735 ASSAY OF MAGNESIUM: CPT

## 2024-11-06 PROCEDURE — 74018 RADEX ABDOMEN 1 VIEW: CPT

## 2024-11-06 PROCEDURE — 82948 REAGENT STRIP/BLOOD GLUCOSE: CPT

## 2024-11-06 PROCEDURE — 93306 TTE W/DOPPLER COMPLETE: CPT

## 2024-11-06 PROCEDURE — 93306 TTE W/DOPPLER COMPLETE: CPT | Performed by: INTERNAL MEDICINE

## 2024-11-06 PROCEDURE — 85610 PROTHROMBIN TIME: CPT

## 2024-11-06 PROCEDURE — 90935 HEMODIALYSIS ONE EVALUATION: CPT | Performed by: INTERNAL MEDICINE

## 2024-11-06 PROCEDURE — 85025 COMPLETE CBC W/AUTO DIFF WBC: CPT

## 2024-11-06 PROCEDURE — 71045 X-RAY EXAM CHEST 1 VIEW: CPT

## 2024-11-06 PROCEDURE — 80053 COMPREHEN METABOLIC PANEL: CPT

## 2024-11-06 PROCEDURE — 82805 BLOOD GASES W/O2 SATURATION: CPT

## 2024-11-06 PROCEDURE — 93010 ELECTROCARDIOGRAM REPORT: CPT | Performed by: INTERNAL MEDICINE

## 2024-11-06 PROCEDURE — 93005 ELECTROCARDIOGRAM TRACING: CPT

## 2024-11-06 PROCEDURE — 99223 1ST HOSP IP/OBS HIGH 75: CPT | Performed by: INTERNAL MEDICINE

## 2024-11-06 PROCEDURE — 84484 ASSAY OF TROPONIN QUANT: CPT

## 2024-11-06 RX ORDER — LOSARTAN POTASSIUM 50 MG/1
100 TABLET ORAL DAILY
Status: DISCONTINUED | OUTPATIENT
Start: 2024-11-06 | End: 2024-11-09

## 2024-11-06 RX ORDER — MUPIROCIN 20 MG/G
OINTMENT TOPICAL
Qty: 22 G | Refills: 0 | OUTPATIENT
Start: 2024-11-06

## 2024-11-06 RX ORDER — LABETALOL 100 MG/1
400 TABLET, FILM COATED ORAL EVERY 8 HOURS SCHEDULED
Status: DISCONTINUED | OUTPATIENT
Start: 2024-11-06 | End: 2024-11-10 | Stop reason: HOSPADM

## 2024-11-06 RX ORDER — ESCITALOPRAM OXALATE 20 MG/1
20 TABLET ORAL DAILY
Status: DISCONTINUED | OUTPATIENT
Start: 2024-11-06 | End: 2024-11-10 | Stop reason: HOSPADM

## 2024-11-06 RX ORDER — NIFEDIPINE 30 MG/1
90 TABLET, EXTENDED RELEASE ORAL DAILY
Status: DISCONTINUED | OUTPATIENT
Start: 2024-11-06 | End: 2024-11-10 | Stop reason: HOSPADM

## 2024-11-06 RX ORDER — FAMOTIDINE 20 MG/1
10 TABLET, FILM COATED ORAL DAILY
Status: DISCONTINUED | OUTPATIENT
Start: 2024-11-06 | End: 2024-11-10 | Stop reason: HOSPADM

## 2024-11-06 RX ADMIN — CHLORHEXIDINE GLUCONATE 15 ML: 1.2 RINSE ORAL at 09:42

## 2024-11-06 RX ADMIN — NIFEDIPINE 90 MG: 30 TABLET, FILM COATED, EXTENDED RELEASE ORAL at 14:55

## 2024-11-06 RX ADMIN — SODIUM CHLORIDE 2.5 MG/HR: 0.9 INJECTION, SOLUTION INTRAVENOUS at 02:22

## 2024-11-06 RX ADMIN — SODIUM CHLORIDE 5 MG/HR: 0.9 INJECTION, SOLUTION INTRAVENOUS at 09:13

## 2024-11-06 RX ADMIN — ESCITALOPRAM OXALATE 20 MG: 20 TABLET ORAL at 09:42

## 2024-11-06 RX ADMIN — LOSARTAN POTASSIUM 100 MG: 50 TABLET, FILM COATED ORAL at 14:55

## 2024-11-06 RX ADMIN — CLOPIDOGREL BISULFATE 75 MG: 75 TABLET ORAL at 09:42

## 2024-11-06 RX ADMIN — ATORVASTATIN CALCIUM 40 MG: 40 TABLET, FILM COATED ORAL at 21:46

## 2024-11-06 RX ADMIN — ASPIRIN 81 MG 81 MG: 81 TABLET ORAL at 09:42

## 2024-11-06 RX ADMIN — FAMOTIDINE 10 MG: 20 TABLET ORAL at 09:42

## 2024-11-06 RX ADMIN — HEPARIN SODIUM 5000 UNITS: 5000 INJECTION INTRAVENOUS; SUBCUTANEOUS at 14:55

## 2024-11-06 RX ADMIN — HEPARIN SODIUM 5000 UNITS: 5000 INJECTION INTRAVENOUS; SUBCUTANEOUS at 21:46

## 2024-11-06 RX ADMIN — LABETALOL HYDROCHLORIDE 400 MG: 200 TABLET, FILM COATED ORAL at 14:55

## 2024-11-06 RX ADMIN — CHLORHEXIDINE GLUCONATE 15 ML: 1.2 RINSE ORAL at 21:46

## 2024-11-06 RX ADMIN — LABETALOL HYDROCHLORIDE 400 MG: 200 TABLET, FILM COATED ORAL at 21:46

## 2024-11-06 RX ADMIN — HEPARIN SODIUM 5000 UNITS: 5000 INJECTION INTRAVENOUS; SUBCUTANEOUS at 05:46

## 2024-11-06 NOTE — SPEECH THERAPY NOTE
Speech Language/Pathology    Speech/Language Pathology Stroke Pathway Screen     Patient Name: Mateus Mckeon  Today's Date: 11/6/2024     Problem List  Principal Problem:    Hypertensive emergency  Active Problems:    ESRD on hemodialysis (HCC)    Type 1 diabetes mellitus on insulin therapy (HCC)    Anemia in ESRD (end-stage renal disease)  (HCC)    Stroke-like symptoms    Chronic kidney disease-mineral and bone disorder      Mateus Mckeon is a 39 y.o. who presents due to dizziness and evaluated as a stroke alert in the emergency department and found to be in hypertensive emergency requiring Cardene drip.  Patient with history of T1DM, multiple CVA, ESRD on HD MWF, hypertension presented to the emergency department due to feeling worsening dizziness earlier today twinge in his left arm.  Patient also states that he has noticed that his blood pressure was abnormally high, typically in the 130 systolic after dialysis but yesterday it was 180 systolic.   On evaluation in the emergency department, patient does endorse dizziness but is otherwise neurologically intact, cranial nerves intact, normal strength sensation, normal coordination.     Consult received for speech/swallow eval on stroke pathway. Pt passed nsg swallow screen; tolerating regular diet w/o s/s dysphagia or aspiration. No speech/language deficits reported.   NIH score is 0.    MRI: pending.    No need for formal speech/swallow eval at this time. Reconsult if needed.    Hailey Coy MA CCC-SLP  Speech Pathologist  [place] license # [id number]  [place] license # [id number]  Available via Secure Chat

## 2024-11-06 NOTE — CASE MANAGEMENT
Case Management Assessment & Discharge Planning Note    Patient name Mateus Mckeon  Location ICU ICU 02 MRN [id number]  : 1985 Date 2024       Current Admission Date: 2024  Current Admission Diagnosis:Hypertensive emergency   Patient Active Problem List    Diagnosis Date Noted Date Diagnosed    Chronic kidney disease-mineral and bone disorder 2024     Stroke-like symptoms 2024     Hypertensive emergency 2024     Primary hypertension 10/18/2024     Secondary hyperparathyroidism of renal origin ([facility]) 10/18/2024     COVID-19 10/18/2024     Non-aneurysmal perimesencephalic subarachnoid hemorrhage ([facility]) 2024     Swelling of joint of upper arm, right 2024     Arm paresthesia, right 2024     Subarachnoid hemorrhage ([facility]) 2024     Anemia due to chronic kidney disease, on chronic dialysis ([facility]) 2024     Type 1 diabetes mellitus on insulin therapy ([facility]) 2023     Hyperlipidemia 2023     Insomnia 2023     RACHEAL (obstructive sleep apnea)      Status post placement of implantable loop recorder 2022     Coronary artery disease involving native coronary artery of native heart without angina pectoris 2022     Hypothyroidism 2022     Cerebellar stroke syndrome 2022     Anemia in ESRD (end-stage renal disease)  ([facility]) 2022     Generalized anxiety disorder 2021     Medical cannabis use 04/15/2021     Moderate episode of recurrent major depressive disorder ([facility]) 04/15/2021     Tubulovillous adenoma of colon 2021     Gastroesophageal reflux disease without esophagitis 2021     ESRD on hemodialysis ([facility]) 2020     Secondary hyperparathyroidism ([facility]) 10/23/2020     Diabetic neuropathy ([facility]) 2020     Provoked seizure ([facility]) 2020     Asterixis 2020     Foot drop, bilateral 2020     Impaired mobility and ADLs 2020     Vertigo 2020     Multiple pulmonary nodules  12/09/2019     Gastroparesis diabeticorum  (HCC) 09/17/2019     Obesity (BMI 30.0-34.9) 09/09/2019     Pruritus 09/06/2019     Environmental and seasonal allergies 05/21/2019     Strabismus 09/24/2018     Dyslipidemia 08/24/2018     Heterozygous for prothrombin [id number] mutation (HCC) 06/04/2018     Renovascular hypertension 03/26/2018     Left atrial dilation 02/27/2018     Persistent proteinuria 02/11/2018     Anemia in chronic kidney disease, on chronic dialysis (HCC) 02/10/2018     Homozygous MTHFR mutation C677T 02/05/2018     Hyperphosphatemia 01/29/2018     Vitamin D deficiency 01/29/2018     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,   01/28/2018     Binocular visual disturbance 01/28/2018     History of lacunar cerebrovascular accident (CVA) 01/22/2018     Type 1 diabetes mellitus (HCC) 06/19/2017     Ataxia 07/21/2015     Cerebrovascular accident (CVA) of left pontine structure (HCC) 07/21/2015       LOS (days): 1  Geometric Mean LOS (GMLOS) (days): 3.1  Days to GMLOS:2.3     OBJECTIVE:    Risk of Unplanned Readmission Score: 29.61         Current admission status: Inpatient       Preferred Pharmacy:   [facility] [address] [place] [id number]  Phone: [phone] Fax: [phone]    Primary Care Provider: Dania Sher DO    Primary Insurance: MEDICARE  Secondary Insurance: [facility]    ASSESSMENT:  Active Health Care Proxies       Francine Mckeon Health Care Representative - Mother   Primary Phone: [phone] (Mobile)  Home Phone: [phone]                 Patient Information  Admitted from:: Home  Mental Status: Other (Comment), Alert (sleeping)  During Assessment patient was accompanied by: Parent (Mother, Francine)  Assessment information provided by:: Parent (y, Francine)  Primary Caregiver: Self  Support Systems: Self, Parent, Family members  County of Residence: [place]  What city do  you live in?: [place]  Home entry access options. Select all that apply.: Stairs  Number of steps to enter home.: 2  Type of Current Residence: 2 story home  Upon entering residence, is there a bedroom on the main floor (no further steps)?: No  A bedroom is located on the following floor levels of residence (select all that apply):: 2nd Floor  Upon entering residence, is there a bathroom on the main floor (no further steps)?: Yes (1/2 bathroom on first floor)  Number of steps to 2nd floor from main floor: One Flight  Living Arrangements: Lives w/ Parent(s)    Activities of Daily Living Prior to Admission  Functional Status: Independent  Completes ADLs independently?: Yes  Ambulates independently?: Yes  Does patient use assisted devices?: Yes  Assisted Devices (DME) used: Straight Cane, Other (Comment), Shower Chair (electric scooter)  Does patient currently own DME?: Yes  What DME does the patient currently own?: Straight Cane, Other (Comment), Walker, Shower Chair, Wheelchair (electric scooter)  Does patient have a history of Outpatient Therapy (PT/OT)?: Yes  Does the patient have a history of Short-Term Rehab?: Yes  Does patient have a history of HHC?: Yes  Does patient currently have HHC?: No    Patient Information Continued  Income Source: Pension/FCI  Does patient have prescription coverage?: Yes  Does patient receive dialysis treatments?: Yes ([facility])  Does patient have a history of substance abuse?: No  Does patient have a history of Mental Health Diagnosis?: Yes  Is patient receiving treatment for mental health?: Yes  Has patient received inpatient treatment related to mental health in the last 2 years?: No    Means of Transportation  Means of Transport to Appts:: Family transport    DISCHARGE DETAILS:    Discharge planning discussed with:: Pt's mother, Francine    Contacts  Patient Contacts: Francine Mckeon  Relationship to Patient:: Family  Contact Method: In Person  Reason/Outcome:  Continuity of Care, Emergency Contact, Discharge Planning    Other Referral/Resources/Interventions Provided:  Interventions: Other (Specify)  Referral Comments: Pt sleeping. CM met with pt's mother, Francine, at bedside. Introduced self/role with dcp. Pt fully IPTA. He has HD MWF @ [facility]. Pt has an insulin pump he manages at home. CM reviewed PT/OT pending and CM would f/u with recommendations. pt is current with OP PT/OT at [facility].

## 2024-11-06 NOTE — UTILIZATION REVIEW
NOTIFICATION OF INPATIENT ADMISSION   AUTHORIZATION REQUEST   SERVICING FACILITY:   [place] [address]  Tax ID: [id number]  NPI: [id number]   ATTENDING PROVIDER:  Attending Name and NPI#: Kade Barkley Md [[id number]]  Address: [address] [place] [address]  Phone: [phone]     ADMISSION INFORMATION:  Place of Service: Inpatient [facility]  Place of Service Code: 21  Inpatient Admission Date/Time: 11/5/24  7:35 PM  Discharge Date/Time: No discharge date for patient encounter.  Admitting Diagnosis Code/Description:  Dizziness [R42]  Tremor [R25.1]  ESRD on hemodialysis (HCC) [N18.6, Z99.2]     UTILIZATION REVIEW CONTACT:  Graciela Casillas Utilization   Network Utilization Review Department  Phone: [phone]  Fax: [phone]  Email: [email or web address]  Contact for approvals/pending authorizations, clinical reviews, and discharge.     PHYSICIAN ADVISORY SERVICES:  Medical Necessity Denial & Hifu-uq-Ccyf Review  Phone: [phone]  Fax: [phone]  Email: [email or web address]     DISCHARGE SUPPORT TEAM:  For Patients Discharge Needs & Updates  Phone: [phone] opt. 2 Fax: [phone]  Email: [email or web address]

## 2024-11-06 NOTE — ASSESSMENT & PLAN NOTE
Pt presented to ED with dizziness which is similar to previous CVA symptoms, stroke alert called, imaging largely unremarkable.  Admission on stroke pathway for follow-up MRI.    Plan:  Q1h neuro checks  Stroke [id number] MRI  A1c, Lipid panel, TSH  81 mg ASA daily  75 mg Plavix daily  Follow-up echo  Continuous tele  PT/OT/Speech eval  STAT head CT for any neurologic changes

## 2024-11-06 NOTE — CONSULTS
NEPHROLOGY HOSPITAL CONSULTATION   Mateus Mckeon 39 y.o. male MRN: [id number]  Unit/Bed#: ICU 02 Encounter: [id number]    Assessment & Plan  ESRD on hemodialysis ([facility])  [facility].  Access: Left arm AVF.  EDW: 90 kg.  Electrolytes are stable.  Plan for regular HD today.  Challenge EDW to 89 kg given hypertension.    Hypertensive emergency  Noted to have accelerated hypertension on presentation.  Home medications per MAR: Labetalol 400 mg TID, Nifedipine 90 mg OD, Olmesartan 40 mg OD.   Will clarify home meds with mother.   BP better now on Cardene drip - Wean Cardene accordingly.   EDW being challenged to 89 kg to help BP.   Restart home BP medications - Losartan autosubbed for Olmesartan.     Anemia in ESRD (end-stage renal disease)  ([facility])  Hgb at goal.   Not on HETAL at this time.     Chronic kidney disease-mineral and bone disorder  In the outpatient setting, he is on Sensipar 120 mg daily and calcitriol 0.75 mcg with HD for 2HPT.  He is on Velphoro for hyperphosphatemia.  Phos is at goal.     Type 1 diabetes mellitus on insulin therapy ([facility])  Defer to primary team.      PLAN SUMMARY:  HD today.   Challenge EDW to 89 kg today to treat hypertension.   Continue Cardene drip and wean accordingly.   Continue home BP medications - Nifedipine 90 mg OD, Labetalol 400 mg TID, and Losartan 100 mg OD (autosubbed).   Plan to restart 2HPT meds in the next 24-48 hours.     Outpatient HD records were reviewed today to determine his dialysis prescription.    HISTORY OF PRESENT ILLNESS:  Requesting Physician: Kade Barkley MD  Reason for Consult: ESRD on HD    Mateus Mckeon is a 39 y.o. male who was admitted to [facility] on 11/5/24 after presenting with dizziness. A renal consultation is requested today for assistance in the management of ESRD. Mateus Mckeon is a known ESRD patient who undergoes maintenance hemodialysis at [facility] on a MWF schedule.     Bill has a history of HTN, DM, HLP, ESRD on HD,  CVA.  He now presents to [facility] on November 5, 2024 after complaining of dizziness.  As per the patient, he reports that his chief complaint was actually tremors but the HPI indicates that he presented with worsening dizziness.  On arrival in the ER, his BP was noted to be 215/100.  He had CT imaging to rule out an acute CVA which was negative.  Thereafter, he was admitted to the ICU and was placed on a Cardene drip for BP control.  We are now being consulted for assistance with management of ESRD on HD.  He denied any fever, chills, chest pain, shortness of breath, abdominal pain, diarrhea, leg swelling.    PAST MEDICAL HISTORY:  Past Medical History:   Diagnosis Date    Acute kidney injury (HCC)     Ambulates with cane     Anuria     Anxiety     Cellulitis of right elbow 03/31/2021    Chronic kidney disease     Depression     Diabetes mellitus (HCC)     Diarrhea     Emesis 10/24/2020    End stage renal disease (HCC) 02/11/2018    Formatting of this note might be different from the original. Last Assessment & Plan:  Secondary to DM.  On nightly PD.  Followed by Nephro.  [facility] Formatting of this note might be different from the original. Last Assessment & Plan:  Formatting of this note might be different from the original. 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    Esophagitis 07/21/2015    Falls     Gastroparesis     GERD (gastroesophageal reflux disease)     History of shingles 2010    History of transfusion 02/2018    no adverse reaction    Hyperlipidemia     Hyperphosphatemia     Hypertension     Hypoglycemia 07/15/2022    Itching     Mastoiditis of right side 07/15/2022    Muscle weakness     general unsteadiness    Obesity (BMI 30.0-34.9) 09/09/2019    Orthostatic hypotension 10/25/2020    Peripheral polyneuropathy 11/20/2019    PONV (postoperative nausea and vomiting) 01/26/2018     Protein-calorie malnutrition (HCC) 11/23/2020    Recurrent peritonitis (HCC) due to peritoneal dialysis catheter 07/31/2020    Retinopathy     Seizures (HCC)     early 2020 - one time    Skin abnormality     some dime size areas where skin was scratched from itching    Sleep apnea     Spontaneous bacterial peritonitis (HCC) 10/19/2020    Squamous cell skin cancer     left temple    Stroke (HCC)     x2 - off balance/no driving/fatigue    Swelling of both lower extremities     Traumatic onycholysis 07/21/2022    Vomiting     Wears glasses      PAST SURGICAL HISTORY:  Past Surgical History:   Procedure Laterality Date    CARDIAC ELECTROPHYSIOLOGY PROCEDURE N/A 9/21/2023    Procedure: Cardiac loop recorder explant;  Surgeon: Parish Morgan MD;  Location: [facility];  Service: Cardiology    CARDIAC LOOP RECORDER  05/2018    COLONOSCOPY      EGD      EYE SURGERY Right     HEMODIALYSIS ADULT  11/6/2024    IR AV FISTULAGRAM/GRAFTOGRAM  02/23/2021    IR CEREBRAL ANGIOGRAPHY  1/12/2024    IR CEREBRAL ANGIOGRAPHY / INTERVENTION  1/5/2024    IR TUNNELED CENTRAL LINE PLACEMENT  02/16/2021    IR TUNNELED DIALYSIS CATHETER PLACEMENT  11/18/2020    IR TUNNELED DIALYSIS CATHETER REMOVAL  02/12/2021    IR TUNNELED DIALYSIS CATHETER REMOVAL  03/11/2021    MOHS SURGERY Left 12/14/2022    Left temple with Dr. Hassan    PERITONEAL CATHETER INSERTION N/A 08/27/2018    Procedure: UNROOF PD CATHETER;  Surgeon: Felipe Lindo DO;  Location: [facility] Main OR;  Service: General    NJ ARTERIOVENOUS ANASTOMOSIS OPEN DIRECT Left 11/09/2020    Procedure: CREATION FISTULA  ARTERIOVENOUS (AV) - LEFT WRIST;  Surgeon: Placido Altamirano MD;  Location: [facility];  Service: Vascular    NJ ESOPHAGOGASTRODUODENOSCOPY TRANSORAL DIAGNOSTIC N/A 04/18/2019    Procedure: ESOPHAGOGASTRODUODENOSCOPY (EGD);  Surgeon: Ale Figueroa MD;  Location: [facility] GI [facility];  Service: Gastroenterology    NJ LAPS INSERTION TUNNELED INTRAPERITONEAL CATHETER N/A 08/06/2018     Procedure: LAPAROSCOPIC PD CATHETER PLACEMENT;  Surgeon: Felipe Lindo DO;  Location: [facility];  Service: General    AR REMOVAL TUNNELED INTRAPERITONEAL CATHETER N/A 2020    Procedure: REMOVAL CATHETER PERITONEAL DIALYSIS;  Surgeon: Abdifatah Ty MD;  Location: [facility];  Service: General    TONSILLECTOMY      UPPER GASTROINTESTINAL ENDOSCOPY       ALLERGIES:  Allergies   Allergen Reactions    Sulfa Antibiotics Rash     SOCIAL HISTORY:  Social History     Substance and Sexual Activity   Alcohol Use Not Currently     Social History     Substance and Sexual Activity   Drug Use Yes    Types: Marijuana    Comment: medical marijuana last vaped 2024     Social History     Tobacco Use   Smoking Status Former    Current packs/day: 0.00    Average packs/day: 0.5 packs/day for 12.0 years (6.0 ttl pk-yrs)    Types: Cigarettes    Start date: 2006    Quit date: 2018    Years since quittin.7   Smokeless Tobacco Never   Tobacco Comments    quit 2018     FAMILY HISTORY:  Family History   Problem Relation Age of Onset    Breast cancer Mother     Hypertension Mother     Hyperlipidemia Father     Hypertension Father     Leukemia Maternal Grandmother     Hyperlipidemia Maternal Grandfather     Hypertension Maternal Grandfather     Hyperlipidemia Paternal Grandmother     Hypertension Paternal Grandmother     Heart disease Paternal Grandfather         cardiac disorder    Diabetes Paternal Grandfather      MEDICATIONS:    Current Facility-Administered Medications:     aspirin chewable tablet 81 mg, 81 mg, Oral, Daily, Aime Gee MD, 81 mg at 24    atorvastatin (LIPITOR) tablet 40 mg, 40 mg, Oral, HS, Aime Gee MD, 40 mg at 24    chlorhexidine (PERIDEX) 0.12 % oral rinse 15 mL, 15 mL, Mouth/Throat, Q12H HALIE, Aime Gee MD, 15 mL at 24    clopidogrel (PLAVIX) tablet 75 mg, 75 mg, Oral, Daily, Aime Gee MD, 75 mg at 24 09    escitalopram (LEXAPRO) tablet 20 mg,  20 mg, Oral, Daily, Chaka Olson DO, 20 mg at 11/06/24 0942    famotidine (PEPCID) tablet 10 mg, 10 mg, Oral, Daily, Chaka Olson DO, 10 mg at 11/06/24 0942    heparin (porcine) subcutaneous injection 5,000 Units, 5,000 Units, Subcutaneous, Q8H HALIE, 5,000 Units at 11/06/24 0546 **AND** [COMPLETED] Platelet count, , , Once, Aime Gee MD    labetalol (NORMODYNE) tablet 400 mg, 400 mg, Oral, Q8H AHLIE, Chaka Olson DO    losartan (COZAAR) tablet 100 mg, 100 mg, Oral, Daily, Chaka Olson DO    niCARdipine (CARDENE) 25 mg (STANDARD CONCENTRATION) in sodium chloride 0.9% 250 mL, 1-15 mg/hr, Intravenous, Titrated, Aime Gee MD, Last Rate: 50 mL/hr at 11/06/24 0913, 5 mg/hr at 11/06/24 0913    NIFEdipine (PROCARDIA XL) 24 hr tablet 90 mg, 90 mg, Oral, Daily, Chaka Olson DO    REVIEW OF SYSTEMS:  All the systems were reviewed and were negative except as documented on the HPI.    PHYSICAL EXAM:  Current Weight: Weight - Scale: 94.8 kg (208 lb 15.9 oz)  First Weight: Weight - Scale: 90.5 kg (199 lb 8.3 oz)  Vitals:    11/06/24 0845 11/06/24 0900 11/06/24 0930 11/06/24 1000   BP: 164/84 165/86 165/83 164/91   BP Location:       Pulse: 78 77 84 90   Resp: 14 15 15 20   Temp:       TempSrc:       SpO2:  100% 99% 99%   Weight:       Height:           Intake/Output Summary (Last 24 hours) at 11/6/2024 1039  Last data filed at 11/6/2024 0845  Gross per 24 hour   Intake 1033.34 ml   Output --   Net 1033.34 ml     Physical Exam  General: conscious, coherent, cooperative, not in distress.   Skin: warm, dry, good turgor.   Eyes: pink conjunctivae, no scleral icterus.   ENT: moist lips and mucous membranes.   Respiratory: equal chest expansion, clear breath sounds.   Cardiovascular: distinct heart sounds, normal rate, regular rhythm, no rub  Abdomen: soft, non-tender, non-distended, normoactive bowel sounds  Extremities: no edema.  Genitourinary: no preston catheter.   Neuro: awake, alert, oriented to time, place and person.    Psych: appropriate affect.       Lab Results:   Results from last 7 days   Lab Units 11/06/24  0452 11/05/24 2034 11/05/24  1606   WBC Thousand/uL 5.16  --  5.71   HEMOGLOBIN g/dL 11.0*  --  12.0   HEMATOCRIT % 32.9*  --  35.9*   PLATELETS Thousands/uL 179 185 206   POTASSIUM mmol/L 3.8  --  4.5   CHLORIDE mmol/L 97  --  100   CO2 mmol/L 31  --  35*   BUN mg/dL 27*  --  25   CREATININE mg/dL 9.75*  --  8.71*   CALCIUM mg/dL 8.4  --  8.1*   MAGNESIUM mg/dL 2.4 2.5  --    PHOSPHORUS mg/dL 4.0 3.6  --    ALK PHOS U/L 98  --  105*   ALT U/L 11  --  13   AST U/L 15  --  20     Lab Results   Component Value Date    .2 (H) 10/22/2020    CALCIUM 8.4 11/06/2024    PHOS 4.0 11/06/2024     Other Studies: none.

## 2024-11-06 NOTE — PROGRESS NOTES
Progress Note - Critical Care/ICU   Name: Mateus Mckeon 39 y.o. male I MRN: [id number]  Unit/Bed#: ICU 02 I Date of Admission: 11/5/2024   Date of Service: 11/6/2024 I Hospital Day: 1       Assessment & Plan  ESRD on hemodialysis (HCC)  Lab Results   Component Value Date    EGFR 5 11/06/2024    EGFR 6 11/05/2024    EGFR 3 10/19/2024    CREATININE 9.75 (H) 11/06/2024    CREATININE 8.71 (H) 11/05/2024    CREATININE 13.55 (H) 10/19/2024       HD MWF  Minimal urine production at baseline.  Baseline Cr- 7-10    Plan:  Strict I/O  Monitor electrolytes and replete as needed  Continue dialysis schedule MWF  Nephrology following  Type 1 diabetes mellitus on insulin therapy (HCC)  Lab Results   Component Value Date    HGBA1C 6.9 (H) 11/05/2024       Recent Labs     11/05/24  1626 11/05/24  2336 11/06/24  0548   POCGLU 170* 182* 154*       Blood Sugar Average: Last 72 hrs:  (P) [id number]    Pt has glucose monitor and insulin pump in place, pt refuses removal unless necessary for MRI.    Plan:  Maintain euglycemia  Stroke-like symptoms  Pt presented to ED with dizziness which is similar to previous CVA symptoms, stroke alert called, imaging largely unremarkable.  Admission on stroke pathway for follow-up MRI.    Plan:  Q1h neuro checks  Stroke yuvlyek-dtuora-zu MRI  A1c, Lipid panel, TSH  81 mg ASA daily  75 mg Plavix daily  Follow-up echo  Continuous tele  PT/OT/Speech eval  STAT head CT for any neurologic changes    Hypertensive emergency  Patient evaluated in ED as stroke alert but found to be hypertensive around 220/105, persisted even after 10 mg hydralazine and 40 mg labetalol, nicardipine drip started in emergency department and patient admitted to the ICU.    Plan:  Continuous cardiac monitoring  Titrate nicardipine drip for systolic blood pressure around 180 in the first hour, may decrease another 10% (down to 160 systolic) in the following 23 hrs.  Monitor morning labs  Restart home  antihypertensives  Anemia in ESRD (end-stage renal disease)  ([facility])  Lab Results   Component Value Date    EGFR 5 11/06/2024    EGFR 6 11/05/2024    EGFR 3 10/19/2024    CREATININE 9.75 (H) 11/06/2024    CREATININE 8.71 (H) 11/05/2024    CREATININE 13.55 (H) 10/19/2024   Continue to monitor CBCs  Hemoglobin has been stable  Patient does not take any erythropoietin stimulating medications at home  Disposition: Stepdown Level 1    ICU Core Measures     A: Assess, Prevent, and Manage Pain Has pain been assessed? Yes  Need for changes to pain regimen? No   B: Both SAT/SAT  N/A   C: Choice of Sedation RASS Goal: 0 Alert and Calm or N/A patient not on sedation  Need for changes to sedation or analgesia regimen? NA   D: Delirium CAM-ICU: Negative   E: Early Mobility  Plan for early mobility? Yes   F: Family Engagement Plan for family engagement today? Yes         Prophylaxis:  VTE VTE covered by:  heparin (porcine), Subcutaneous, 5,000 Units at 11/06/24 0546       Stress Ulcer  not orderedcovered byfamotidine (PEPCID) 40 MG tablet [[id number]] (Long-Term Med)         24 Hour Events : Patient has no acute complaints overnight.  He states he still feels dizzy and confused like yesterday.  Blood pressure has been labile and patient has been on continuous nicardipine drip between 2.5 and 5 mg/h.  Patient denies being in any pain.  Subjective   Review of Systems: Review of Systems   Constitutional:  Positive for fatigue. Negative for chills and fever.   HENT:  Negative for ear pain and sore throat.    Eyes:  Negative for pain and visual disturbance.   Respiratory:  Negative for cough, chest tightness and shortness of breath.    Cardiovascular:  Negative for chest pain and palpitations.   Gastrointestinal:  Negative for abdominal pain, nausea and vomiting.   Genitourinary:  Negative for dysuria and hematuria.   Musculoskeletal:  Negative for arthralgias and back pain.   Skin:  Negative for color change and rash.   Neurological:   Positive for dizziness and tremors. Negative for seizures, syncope, facial asymmetry, weakness, numbness and headaches.   All other systems reviewed and are negative.      Objective :                   Vitals I/O      Most Recent Min/Max in 24hrs   Temp 98 °F (36.7 °C) Temp  Min: 98 °F (36.7 °C)  Max: 98.6 °F (37 °C)   Pulse 80 Pulse  Min: 64  Max: 86   Resp 18 Resp  Min: 17  Max: 20   /76 BP  Min: 142/70  Max: 226/104   O2 Sat 99 % SpO2  Min: 98 %  Max: 100 %      Intake/Output Summary (Last 24 hours) at 11/6/2024 0720  Last data filed at 11/6/2024 0534  Gross per 24 hour   Intake 833.34 ml   Output --   Net 833.34 ml       Diet Charanjit/CHO Controlled; Consistent Carbohydrate Diet Level 3 (6 carb servings/90 grams CHO/meal)    Invasive Monitoring           Physical Exam   Physical Exam  Eyes:      General: NeglectNo scleral icterus.        Right eye: No discharge.         Left eye: No discharge.      Conjunctiva/sclera: Conjunctivae normal.   HENT:      Head: Normocephalic and atraumatic.      Nose: No nasal deformity or rhinorrhea.      Mouth/Throat:      Mouth: Mucous membranes are moist.   Cardiovascular:      Rate and Rhythm: Normal rate and regular rhythm.      Pulses: Normal pulses.      Heart sounds: Normal heart sounds.   Musculoskeletal:      Right lower leg: No edema.      Left lower leg: No edema.   Abdominal: General: There is no distension.      Palpations: Abdomen is soft.      Tenderness: There is no abdominal tenderness. There is no guarding.   Constitutional:       General: He is not in acute distress.     Appearance: He is well-developed and well-nourished. He is not ill-appearing.      Interventions: He is not sedated and not intubated.  Pulmonary:      Effort: Pulmonary effort is normal. No respiratory distress. He is not intubated.      Breath sounds: Normal breath sounds. No wheezing or rhonchi.   Psychiatric:         Mood and Affect:  mood and affect abnormal.  Neurological:      Mental  Status: He is alert and oriented to person, place and time.      Cranial Nerves: No facial asymmetry.      Sensory: No sensory deficit.      Motor: No motor deficit.      Comments: Cranial nerves II through XII intact.  Facial has right-sided hemifacial fasciculations when attempting to smile.  Patient denies any sensory changes and has 5 out of 5 strength in both upper and lower extremities.          Diagnostic Studies        Lab Results: I have reviewed the following results:     Medications:  Scheduled PRN   aspirin, 81 mg, Daily  atorvastatin, 40 mg, HS  chlorhexidine, 15 mL, Q12H HALIE  clopidogrel, 75 mg, Daily  heparin (porcine), 5,000 Units, Q8H HALIE          Continuous    niCARdipine, 1-15 mg/hr, Last Rate: 5 mg/hr (11/06/24 0524)         Labs:   CBC    Recent Labs     11/05/24 1606 11/05/24 2034 11/06/24 0452   WBC 5.71  --  5.16   HGB 12.0  --  11.0*   HCT 35.9*  --  32.9*    185 179     BMP    Recent Labs     11/05/24  1606 11/06/24 0452   SODIUM 142 139   K 4.5 3.8    97   CO2 35* 31   AGAP 7 11   BUN 25 27*   CREATININE 8.71* 9.75*   CALCIUM 8.1* 8.4       Coags    Recent Labs     11/05/24  1810 11/06/24 0452   INR 1.01 0.99   PTT 32  --         Additional Electrolytes  Recent Labs     11/05/24 2034 11/06/24 0452   MG 2.5 2.4   PHOS 3.6 4.0   CAIONIZED 0.98* 1.02*          Blood Gas    No recent results  No recent results LFTs  Recent Labs     11/05/24  1606 11/06/24 0452   ALT 13 11   AST 20 15   ALKPHOS 105* 98   ALB 3.9 3.6   TBILI 0.59 0.55       Infectious  No recent results  Glucose  Recent Labs     11/05/24  1606 11/06/24  0452   GLUC 176* 145*

## 2024-11-06 NOTE — ASSESSMENT & PLAN NOTE
Lab Results   Component Value Date    EGFR 5 11/06/2024    EGFR 6 11/05/2024    EGFR 3 10/19/2024    CREATININE 9.75 (H) 11/06/2024    CREATININE 8.71 (H) 11/05/2024    CREATININE 13.55 (H) 10/19/2024   Continue to monitor CBCs  Hemoglobin has been stable  Patient does not take any erythropoietin stimulating medications at home

## 2024-11-06 NOTE — ED ATTENDING ATTESTATION
11/5/2024  ISandhya MD, saw and evaluated the patient. I have discussed the patient with the resident/non-physician practitioner and agree with the resident's/non-physician practitioner's findings, Plan of Care, and MDM as documented in the resident's/non-physician practitioner's note, except where noted. All available labs and Radiology studies were reviewed.  I was present for key portions of any procedure(s) performed by the resident/non-physician practitioner and I was immediately available to provide assistance.       At this point I agree with the current assessment done in the Emergency Department.  I have conducted an independent evaluation of this patient a history and physical is as follows:    ED Course  ED Course as of 11/05/24 2003 Tue Nov 05, 2024   1700 39-year-old gentleman resenting to the emergency department with **.  Past medical history significant for **.  Vital signs remarkable for hypertension.  Physical exam remarkable for **    Clinical presentation puts patient at risk for the following differential diagnoses:    CVA, intracranial bleed, intracranial mass, electrolyte imbalance, metabolic disturbance    Lab work and imaging was ordered.  Patient was stroke alerted due to **   1750 CTA stroke alert (head/neck)    Result Date: 11/5/2024  Impression: 1. CTA head: Negative for large vessel intracranial occlusion or hemodynamically significant stenosis. 2. CTA neck: Mild left extracranial ICA stenosis. The cervical vertebral arteries are patent. Findings were communicated to Dr. Bryan Justice at 4:54 p.m. Workstation performed: [id number]     CT stroke alert brain    Result Date: 11/5/2024  Impression: No acute intracranial abnormality. Chronic left posterior cerebellar, left corona radiata/centrum semiovale and bilateral basal ganglia lacunar infarcts. Chronic microangiopathic ischemic changes. Workstation performed: [id number]        1750 Blood Pressure(!): 219/104  Patient  remains hypertensive.  Goal SBP < 180.  Patient to be admitted for stroke pathway.   1954 Despite receiving couple doses of labetalol and hydralazine, patient's blood pressure has not decreased.  Patient started on nicardipine.  Admitted to critical care.         Critical Care Time  Procedures

## 2024-11-06 NOTE — ASSESSMENT & PLAN NOTE
39 y.o. 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     BP on presentation 215/100, SBP as high as 226. NIHSS: 3 (baseline deficits including R lower quadrant VF deficit and RUE numbness, and RUE ataxia). Initial CT imaging unremarkable for acute intracranial findings. Patient was not a TNK candidate due to unclear time of onset outside appropriate time window (LKW 8:30 am).    Workup:  - CT head:   Unremarkable for acute intracranial abnormalities  Chronic left posterior cerebellar, left corona radiata/centrum semiovale, bilateral basal ganglia lacunar infarcts noted  - CTA head and neck:  Unremarkable for large vessel occlusion or critical stenosis  Mild left extracranial ICA stenosis noted  - MRI brain wo negative for acute intracranial abnormalities, unchanged scattered hemosiderosis  - Echo: EF 65%, mildly dilated L atrium  - Lipid panel: Cholesterol 102, LDL 47  - Hemoglobin A1c: 6.9    Patient continues to report word finding difficulty, however this is not appreciable on exam. Neuroimaging negative for acute ischemia, do not suspect TIA as symptoms present > 24 hrs. Consider recrudescence of prior stroke given reported dizziness and word finding difficulties that patient reports feels similar to prior stroke symptoms.  Recommend additional workup for toxic/metabolic etiologies.    Plan:  - No need for stroke pathway  - s/p Aspirin 244 mg x 1 (patient reports taking home ASA 81 mg on 11/5) and Plavix 300 mg x 1  - Continue with Aspirin 81 mg and  Atorvastatin 40 mg qHS (home medications).  - Okay to discontinue Plavix 75 mg  - Goal normotension, avoid hypotension  - Cardene gtt discontinued 11/6  - Therapies as needed  - Medical management and supportive care per primary team. Correction of any metabolic or infectious disturbances.

## 2024-11-06 NOTE — ASSESSMENT & PLAN NOTE
Noted to have accelerated hypertension on presentation.  Home medications per MAR: Labetalol 400 mg TID, Nifedipine 90 mg OD, Olmesartan 40 mg OD.   Will clarify home meds with mother.   BP better now on Cardene drip - Wean Cardene accordingly.   EDW being challenged to 89 kg to help BP.   Restart home BP medications - Losartan autosubbed for Olmesartan.

## 2024-11-06 NOTE — ASSESSMENT & PLAN NOTE
[facility] MWF.  Access: Left arm AVF.  EDW: 90 kg.  Electrolytes are stable.  Plan for regular HD today.  Challenge EDW to 89 kg given hypertension.

## 2024-11-06 NOTE — H&P
H&P - Critical Care/ICU   Name: Mateus Mckeon 39 y.o. male I MRN: [id number]  Unit/Bed#: ICU 02 I Date of Admission: 11/5/2024   Date of Service: 11/5/2024 I Hospital Day: 0       Assessment & Plan  ESRD on hemodialysis (HCC)  Lab Results   Component Value Date    EGFR 6 11/05/2024    EGFR 3 10/19/2024    EGFR 4 10/18/2024    CREATININE 8.71 (H) 11/05/2024    CREATININE 13.55 (H) 10/19/2024    CREATININE 11.51 (H) 10/18/2024       HD MWF  Minimal urine production at baseline.  Baseline Cr- 7-10    Plan:  Strict I/O  Monitor electrolytes  Continue dialysis schedule MWF  Consult Nephrology  Type 1 diabetes mellitus on insulin therapy (HCC)  Lab Results   Component Value Date    HGBA1C 6.2 (H) 07/16/2024       Recent Labs     11/05/24  1626   POCGLU 170*       Blood Sugar Average: Last 72 hrs:  (P) 170    Pt has glucose monitor and insulin pump in place, pt refuses removal unless necessary for MRI.    Plan:  Maintain euglycemia  Stroke-like symptoms  Pt presented to ED with dizziness which is similar to previous CVA symptoms, stroke alert called, imaging largely unremarkable.  Admission on stroke pathway for follow-up MRI.    Plan:  Q1h neuro checks  Stroke [id number] MRI  A1c, Lipid panel, TSH  81 mg ASA daily  75 mg Plavix daily  Follow-up echo  Continuous tele  PT/OT/Speech eval  STAT head CT for any neurologic changes    Hypertensive emergency  Patient evaluated in ED as stroke alert but found to be hypertensive around 220/105, persisted even after 10 mg hydralazine and 40 mg labetalol, nicardipine drip started in emergency department and patient admitted to the ICU.    Plan:  Continuous cardiac monitoring  Titrate nicardipine drip for systolic blood pressure around 180 in the first hour, may decrease another 10% (down to 160 systolic) in the following 23 hrs.  Monitor morning labs  Disposition: Stepdown Level 1    History of Present Illness   Mateus Mckeon is a 39 y.o. who presents due to  dizziness and evaluated as a stroke alert in the emergency department and found to be in hypertensive emergency requiring Cardene drip.  Patient with history of T1DM, multiple CVA, ESRD on HD MWF, hypertension presented to the emergency department due to feeling worsening dizziness earlier today twinge in his left arm.  Patient also states that he has noticed that his blood pressure was abnormally high, typically in the 130 systolic after dialysis but yesterday it was 180 systolic.  Patient endorses taking all his home medications and no other changes or infectious symptoms noted.  Patient was evaluated in the emergency department as a stroke alert, and imaging largely unremarkable.  Plan to admit on stroke pathway for follow-up imaging but due to persistent increased pressures, Cardene drip was required and patient was admitted to the ICU for management.  On evaluation in the emergency department, patient does endorse dizziness but is otherwise neurologically intact, cranial nerves intact, normal strength sensation, normal coordination.  Pt does endorse previous episodes of uncontrollable high blood pressure in the past requiring ICU admission, last one in Jan or Feb of this year.    History obtained from chart review and the patient.      Historical Information   Past Medical History:  No date: Acute kidney injury (HCC)  No date: Ambulates with cane  No date: Anuria  No date: Anxiety  03/31/2021: Cellulitis of right elbow  No date: Chronic kidney disease  No date: Depression  No date: Diabetes mellitus (HCC)  No date: Diarrhea  10/24/2020: Emesis  02/11/2018: End stage renal disease (HCC)      Comment:  Formatting of this note might be different from the                original. Last Assessment & Plan:  Secondary to DM.  On                nightly PD.  Followed by Nephro.  Patient [facility]                [facility]                Formatting of this note might be different  from the                original. Last Assessment & Plan:  Formatting of this                note might be different from the original. 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  07/21/2015: Esophagitis  No date: Falls  No date: Gastroparesis  No date: GERD (gastroesophageal reflux disease)  2010: History of shingles  02/2018: History of transfusion      Comment:  no adverse reaction  No date: Hyperlipidemia  No date: Hyperphosphatemia  No date: Hypertension  07/15/2022: Hypoglycemia  No date: Itching  07/15/2022: Mastoiditis of right side  No date: Muscle weakness      Comment:  general unsteadiness  09/09/2019: Obesity (BMI 30.0-34.9)  10/25/2020: Orthostatic hypotension  11/20/2019: Peripheral polyneuropathy  01/26/2018: PONV (postoperative nausea and vomiting)  11/23/2020: Protein-calorie malnutrition (HCC)  07/31/2020: Recurrent peritonitis (HCC) due to peritoneal dialysis   catheter  No date: Retinopathy  No date: Seizures (HCC)      Comment:  early 2020 - one time  No date: Skin abnormality      Comment:  some dime size areas where skin was scratched from                itching  No date: Sleep apnea  10/19/2020: Spontaneous bacterial peritonitis (HCC)  No date: Squamous cell skin cancer      Comment:  left temple  No date: Stroke (HCC)      Comment:  x2 - off balance/no driving/fatigue  No date: Swelling of both lower extremities  07/21/2022: Traumatic onycholysis  No date: Vomiting  No date: Wears glasses Past Surgical History:  9/21/2023: CARDIAC ELECTROPHYSIOLOGY PROCEDURE; N/A      Comment:  Procedure: Cardiac loop recorder explant;  Surgeon:                Parish Morgan MD;  Location: [facility];                 Service: Cardiology  05/2018: CARDIAC LOOP RECORDER  No date: COLONOSCOPY  No date: EGD  No date: EYE SURGERY; Right  02/23/2021: IR AV FISTULAGRAM/GRAFTOGRAM  1/12/2024: IR CEREBRAL ANGIOGRAPHY  1/5/2024: IR CEREBRAL ANGIOGRAPHY /  INTERVENTION  02/16/2021: IR TUNNELED CENTRAL LINE PLACEMENT  11/18/2020: IR TUNNELED DIALYSIS CATHETER PLACEMENT  02/12/2021: IR TUNNELED DIALYSIS CATHETER REMOVAL  03/11/2021: IR TUNNELED DIALYSIS CATHETER REMOVAL  12/14/2022: MOHS SURGERY; Left      Comment:  Left temple with Dr. Hassan  08/27/2018: PERITONEAL CATHETER INSERTION; N/A      Comment:  Procedure: UNROOF PD CATHETER;  Surgeon: Felipe Lindo DO;  Location: [facility];  Service: General  11/09/2020: OH ARTERIOVENOUS ANASTOMOSIS OPEN DIRECT; Left      Comment:  Procedure: CREATION FISTULA  ARTERIOVENOUS (AV) - LEFT                WRIST;  Surgeon: Placido Altamirano MD;  Location: [facility];                 Service: Vascular  04/18/2019: OH ESOPHAGOGASTRODUODENOSCOPY TRANSORAL DIAGNOSTIC; N/A      Comment:  Procedure: ESOPHAGOGASTRODUODENOSCOPY (EGD);  Surgeon:                Ale Figueroa MD;  Location: [facility];  Service:                Gastroenterology  08/06/2018: OH LAPS INSERTION TUNNELED INTRAPERITONEAL CATHETER; N/A      Comment:  Procedure: LAPAROSCOPIC PD CATHETER PLACEMENT;  Surgeon:               Felipe Lindo DO;  Location: [facility];  Service:                General  11/18/2020: OH REMOVAL TUNNELED INTRAPERITONEAL CATHETER; N/A      Comment:  Procedure: REMOVAL CATHETER PERITONEAL DIALYSIS;                 Surgeon: Abdifatah Ty MD;  Location: [facility];                 Service: General  No date: TONSILLECTOMY  No date: UPPER GASTROINTESTINAL ENDOSCOPY   Current Outpatient Medications   Medication Instructions    aspirin (ECOTRIN LOW STRENGTH) 81 mg, Oral, Daily    atorvastatin (LIPITOR) 40 mg, Oral, Every evening    b complex vitamins capsule 1 capsule, Oral, Daily before lunch    calcium acetate (PHOSLO) 667 mg, Oral, 3 times daily    cinacalcet (SENSIPAR) 60 mg, Oral, Daily    escitalopram (LEXAPRO) 20 mg, Oral, Daily    famotidine (PEPCID) 40 mg, Oral, Every morning    gabapentin (NEURONTIN) 100 mg capsule TAKE 1 CAPSULE  BY MOUTH IN THE MORNING AND 2 AT BEDTIME    GLUCAGON EMERGENCY 1 MG injection     Gvoke HypoPen 1-Pack 1 MG/0.2ML SOAJ As needed    hydrOXYzine HCL (ATARAX) 10 mg, Oral, Every 6 hours PRN    Insulin Disposable Pump (Omnipod 5 G6 Intro, Gen 5,) KIT No dose, route, or frequency recorded.    Insulin Disposable Pump (Omnipod 5 G6 Pod, Gen 5,) MISC No dose, route, or frequency recorded.    labetalol (NORMODYNE) 400 mg, Oral, 3 times daily    metoclopramide (REGLAN) 5 mg, Oral, 3 times daily PRN    mupirocin (BACTROBAN) 2 % ointment Topical, 2 times daily    NIFEdipine (PROCARDIA XL) 90 mg, Oral, Daily    NovoLOG 100 UNIT/ML injection     olmesartan (BENICAR) 40 mg, Oral, Daily    ondansetron (ZOFRAN) 4 mg, Oral, Every 8 hours PRN    Patiromer Sorbitex Calcium (VELTASSA PO) 5 g, Oral, Weekly    saccharomyces boulardii (FLORASTOR) 250 mg, Daily    SPS 15 GM/60ML suspension TAKE 60 ML BY MOUTH SINGLE DOSE FOR EMERGENCY USE DURING MISSED HEMODIALYSIS    traZODone (DESYREL) 50 mg tablet TAKE 1/2 (ONE-HALF) TABLET BY MOUTH ONCE DAILY AT BEDTIME AS NEEDED FOR SLEEP    Allergies   Allergen Reactions    Sulfa Antibiotics Rash      Social History     Tobacco Use    Smoking status: Former     Current packs/day: 0.00     Average packs/day: 0.5 packs/day for 12.0 years (6.0 ttl pk-yrs)     Types: Cigarettes     Start date: 2006     Quit date: 2018     Years since quittin.7    Smokeless tobacco: Never    Tobacco comments:     quit 2018   Vaping Use    Vaping status: Every Day    Substances: THC, CBD   Substance Use Topics    Alcohol use: Not Currently    Drug use: Yes     Types: Marijuana     Comment: medical marijuana last vaped 2024    Family History   Problem Relation Age of Onset    Breast cancer Mother     Hypertension Mother     Hyperlipidemia Father     Hypertension Father     Leukemia Maternal Grandmother     Hyperlipidemia Maternal Grandfather     Hypertension Maternal Grandfather     Hyperlipidemia Paternal  Grandmother     Hypertension Paternal Grandmother     Heart disease Paternal Grandfather         cardiac disorder    Diabetes Paternal Grandfather           Objective :                   Vitals I/O      Most Recent Min/Max in 24hrs   Temp 98.3 °F (36.8 °C) Temp  Min: 98.3 °F (36.8 °C)  Max: 98.6 °F (37 °C)   Pulse 86 Pulse  Min: 64  Max: 86   Resp 18 Resp  Min: 18  Max: 20   BP (!) 178/86 BP  Min: 163/81  Max: 226/104   O2 Sat 100 % SpO2  Min: 98 %  Max: 100 %    No intake or output data in the 24 hours ending 11/05/24 2122    Diet Charanjit/CHO Controlled; Consistent Carbohydrate Diet Level 3 (6 carb servings/90 grams CHO/meal)    Invasive Monitoring           Physical Exam   Physical Exam  Eyes:      Conjunctiva/sclera: Conjunctivae normal.      Pupils: Pupils are equal, round, and reactive to light.   Skin:     General: Skin is warm.   HENT:      Head: Normocephalic and atraumatic.      Mouth/Throat:      Mouth: Mucous membranes are moist.   Neck:   no midline tenderness Cardiovascular:      Rate and Rhythm: Normal rate and regular rhythm.      Pulses: Normal pulses.   Musculoskeletal:         General: No swelling, tenderness or signs of injury. Normal range of motion.      Right lower leg: No edema.      Left lower leg: No edema.   Abdominal:      Palpations: Abdomen is soft.   Constitutional:       General: He is not in acute distress.  Pulmonary:      Effort: Tachypnea present. No respiratory distress.   Psychiatric:         Speech: Speech is not no receptive aphasia or no expressive aphasia.   Neurological:      General: No focal deficit present.      Mental Status: He is alert and oriented to person, place and time. Mental status is at baseline.      Cranial Nerves: No dysarthria or facial asymmetry.      Sensory: No sensory deficit.      Motor: Strength full and intact in all extremities. No motor deficit.          Diagnostic Studies        Lab Results: I have reviewed the following results:      Medications:  Scheduled PRN   [START ON 11/6/2024] aspirin, 81 mg, Daily  atorvastatin, 40 mg, HS  chlorhexidine, 15 mL, Q12H HALIE  [START ON 11/6/2024] clopidogrel, 75 mg, Daily  heparin (porcine), 5,000 Units, Q8H HALIE          Continuous    niCARdipine, 1-15 mg/hr, Last Rate: 2.5 mg/hr (11/05/24 2012)         Labs:   CBC    Recent Labs     11/05/24  1606 11/05/24 2034   WBC 5.71  --    HGB 12.0  --    HCT 35.9*  --     185     BMP    Recent Labs     11/05/24  1606   SODIUM 142   K 4.5      CO2 35*   AGAP 7   BUN 25   CREATININE 8.71*   CALCIUM 8.1*       Coags    Recent Labs     11/05/24  1810   INR 1.01   PTT 32        Additional Electrolytes  Recent Labs     11/05/24 2034   MG 2.5   PHOS 3.6   CAIONIZED 0.98*          Blood Gas    No recent results  No recent results LFTs  Recent Labs     11/05/24  1606   ALT 13   AST 20   ALKPHOS 105*   ALB 3.9   TBILI 0.59       Infectious  No recent results  Glucose  Recent Labs     11/05/24  1606   GLUC 176*

## 2024-11-06 NOTE — QUICK NOTE
Patient was seen this morning by myself and Dr. Adame.  He was receiving dialysis at the time which limited neuroexam, however he was awake and alert.  He was seen to have myoclonic jerking while in bed and continued to report word finding difficulty.    He was made aware that we are waiting for MRI brain to rule out acute stroke - discussed with primary team that patient does not require contrast for this study.  Echo completed which revealed EF 65% with mildly dilated left atrium.  Patient can continue DAPT (aspirin/Plavix) and home atorvastatin at this time.  Continue with SBP goal <180 for history of spontaneous basilar cistern SAH.  Please see remainder of plan in initial consult note completed by Arielle Zepeda PA-C and attested by Dr. Justice.

## 2024-11-06 NOTE — ASSESSMENT & PLAN NOTE
Pt presented to ED with dizziness which is similar to previous CVA symptoms, stroke alert called, imaging largely unremarkable.  Admission on stroke pathway for follow-up MRI.    Plan:  Q1h neuro checks  Stroke [id number] MRI  A1c, Lipid panel, TSH  81 mg ASA daily  75 mg Plavix daily  Follow-up echo  Continuous tele  PT/OT/Speech eval  STAT head CT for any neurologic changes

## 2024-11-06 NOTE — PROCEDURES
RENAL HD NOTE   Mateus Mckeon 39 y.o. male MRN: [id number]  Unit/Bed#: ICU 02 Encounter: [id number]    The patient was seen and examined on hemodialysis.  Time: 4 hours  Sodium: 137 Blood flow: 450   Dialyzer: F180 Potassium: 4 Dialysate flow: 700   Access: L arm AVF Bicarbonate: 35 Ultrafiltration goal: 3.7 kg   Medications on HD: none.      UF 3.7 kg to challenge EDW to 89 kg.     Addendum:  Called by RN due to patient being restless and with abdominal cramping. Noted to be diaphoretic.   UF turned off and patient still with complaints.   Decided to terminate HD prematurely - 30 mins left on treatment.

## 2024-11-06 NOTE — ASSESSMENT & PLAN NOTE
Patient evaluated in ED as stroke alert but found to be hypertensive around 220/105, persisted even after 10 mg hydralazine and 40 mg labetalol, nicardipine drip started in emergency department and patient admitted to the ICU.    Plan:  Continuous cardiac monitoring  Titrate nicardipine drip for systolic blood pressure around 180 in the first hour, may decrease another 10% (down to 160 systolic) in the following 23 hrs.  Monitor morning labs

## 2024-11-06 NOTE — UTILIZATION REVIEW
Initial Clinical Review    Admission: Date/Time/Statement:   Admission Orders (From admission, onward)       Ordered        11/05/24 1935  INPATIENT ADMISSION  Once                          Orders Placed This Encounter   Procedures    INPATIENT ADMISSION     Standing Status:   Standing     Number of Occurrences:   1     Order Specific Question:   Level of Care     Answer:   Critical Care [15]     Order Specific Question:   Estimated length of stay     Answer:   More than 2 Midnights     Order Specific Question:   Certification     Answer:   I certify that inpatient services are medically necessary for this patient for a duration of greater than two midnights. See H&P and MD Progress Notes for additional information about the patient's course of treatment.     ED Arrival Information       Expected   -    Arrival   11/5/2024 15:46    Acuity   Urgent              Means of arrival   Walk-In    Escorted by   Family Member    Service   Critical Care/ICU    Admission type   Emergency              Arrival complaint   Dizziness, muscle spasms             Chief Complaint   Patient presents with    Dizziness     Arrives with reports of dizziness that started this morning after waking up. Also reports confusion, GCS 15. Reports twitching in left arm. Ambulated from WR to room with cane.       Initial Presentation: 39 y.o. male  to ED via walk in from home.    Admitted to inpatient with Dx: Stroke like symptoms/hypertensive emergency/ESRD on HD/Type 1 DM/Anemia in ESRD.  Presented to ED with  dizziness starting upon awakening at 0800 and worsened 1 hour later. PMHx:  T1DM, multiple CVA, ESRD on HD MWF, hypertension : . On exam:  hypertensive.  Alert and oriented.    Imaging shows no acute findings,  chronic infarcts.  Bun 25.  Creatinine 8.71 and baseline is 7 to 10.   ED treatment:  asa 244 mg, Plavix 300 mg,  Labetalol 20 mg IV x 2, Hydralazine 10 mg IV,  started in Nicardipine.    Plan includes  to admit to Stepdown Level 1.    Hourly Neuro checks.   MRI brain.  Asa and Plavix daily.  Echo.  Telemetry.  PT/OT/Speech.   Titrate nicardipine drip for systolic blood pressure around 180 in the first hour, may decrease another 10% (down to 160 systolic) in the following 23 hrs.   Consult nephrology and neurology.   Monitor electrolytes.    Continue dialysis schedule MWF.  Pt has glucose monitor and insulin pump in place, pt refuses removal unless necessary for MRI.   Goal is euglycemia.      11/5/24 per neurology - stroke like symptoms, NIHSS 3 for right lower quadrant visual deficit, right upper extremity sensory deficit, and right upper extremity ataxia.  Differential of acute infarct vs Recrudesce vs toxic/metabolic etiologies.  Not a thrombolytic candidate - outside of window.  Recommend to rule out acute infarct:  MRI brain, echo.  Fasting lipid panel, Hemoglobin A1c, TSH.   Antiplatelet load:  mg x 1 (patient reports taking home ASA 81 mg at noon today 11/5) and Plavix 300 mg x 1. Then continue ASA 81 mg daily and Plavix 75 mg daily starting 11/6/2024.   Continue home atorvastatin 40 mg daily.   Systolic BP <180 given history of spontaneous basilar cistern subarachnoid hemorrhage.  If MRI brain is negative, recommend normotension.  Telemetry.  Frequent neuro checks.  PT/OT/St.  Ct brain if change in neuro exam.  Correct metabolic or infectious disturbances.      Date: 11/6/24   Day 2: + fatigue.  Still feels dizzy and confused like yesterday.    Blood pressure has been labile and patient has been on continuous nicardipine drip between 2.5 and 5 mg/h.    On exam:  Cranial nerves II through XII intact.  Facial has right-sided hemifacial fasciculations when attempting to smile.  Patient denies any sensory changes and has 5 out of 5 strength in both upper and lower extremities.   Bun 27.  Creatinine 9.75.   glucose 145.  Continue neuro checks.  Follow up on MRI.  Continue asa and Plavix.  Echo.   Telemetry.  PT/OT.   Titrate  nicardipine drip for systolic blood pressure around 180 in the first hour, may decrease another 10% (down to 160 systolic) in the following 23 hrs.   Restart home antihypertensives.  Continue dialysis schedule MWF.   Monitor electrolytes and replete as needed .  Maintain euglycemia     11/6/24 per Nephrology - ESRD on HD/Hypertensive Emergency/Anemia in ESRD/CKD mineral and bone disorder/Type 1 DM on insulin.    Plan is HD today.  Challenge EDW to 89 kg today to treat hypertension.  Continue Cardene drip and wean as able.  Continue home BP medications - Nifedipine 90 mg OD, Labetalol 400 mg TID, and Losartan 100 mg OD (autosubbed).     ED Treatment-Medication Administration from 11/05/2024 1546 to 11/05/2024 2025         Date/Time Order Dose Route Action     11/05/2024 1711 aspirin chewable tablet 244 mg 244 mg Oral Given     11/05/2024 1711 clopidogrel (PLAVIX) tablet 300 mg 300 mg Oral Given     11/05/2024 1716 labetalol (NORMODYNE) injection 20 mg 20 mg Intravenous Given     11/05/2024 1804 labetalol (NORMODYNE) injection 20 mg 20 mg Intravenous Given     11/05/2024 1834 hydrALAZINE (APRESOLINE) injection 10 mg 10 mg Intravenous Given     11/05/2024 1915 niCARdipine (CARDENE) 25 mg (STANDARD CONCENTRATION) in sodium chloride 0.9% 250 mL 10 mg/hr Intravenous New Bag     11/05/2024 1930 niCARdipine (CARDENE) 25 mg (STANDARD CONCENTRATION) in sodium chloride 0.9% 250 mL 7.5 mg/hr Intravenous Rate/Dose Change     11/05/2024 1959 niCARdipine (CARDENE) 25 mg (STANDARD CONCENTRATION) in sodium chloride 0.9% 250 mL 5 mg/hr Intravenous Rate/Dose Change     11/05/2024 2012 niCARdipine (CARDENE) 25 mg (STANDARD CONCENTRATION) in sodium chloride 0.9% 250 mL 2.5 mg/hr Intravenous Rate/Dose Change            Scheduled Medications:  aspirin, 81 mg, Oral, Daily  atorvastatin, 40 mg, Oral, HS  chlorhexidine, 15 mL, Mouth/Throat, Q12H HALIE  clopidogrel, 75 mg, Oral, Daily  heparin (porcine), 5,000 Units, Subcutaneous, Q8H  HALIE      Continuous IV Infusions:  niCARdipine, 1-15 mg/hr, Intravenous, Titrated      PRN Meds:     ED Triage Vitals [11/05/24 1557]   Temperature Pulse Respirations Blood Pressure SpO2 Pain Score   98.6 °F (37 °C) 72 18 (!) 215/100 100 % No Pain     Weight (last 2 days)       Date/Time Weight    11/06/24 0700 94.8 (209)    11/05/24 2025 94.8 (209)    11/05/24 1557 90.5 (199.52)            Vital Signs (last 3 days)       Date/Time Temp Pulse Resp BP MAP (mmHg) SpO2 O2 Device Patient Position - Orthostatic VS [employer] Coma Scale Score Pain    11/06/24 1215 98.5 °F (36.9 °C) 102 25 170/88 120 100 % -- Lying -- --    11/06/24 1200 -- 109 14 171/89 122 100 % -- Lying -- --    11/06/24 1130 -- 94 14 142/83 109 -- -- -- -- --    11/06/24 1100 98.5 °F (36.9 °C) 105 16 158/91 117 -- -- -- -- --    11/06/24 1030 -- 91 15 158/85 114 -- -- -- -- --    11/06/24 1000 -- 90 20 164/91 121 99 % None (Room air) -- -- --    11/06/24 0930 -- 84 15 165/83 117 99 % -- -- -- --    11/06/24 0900 -- 77 15 165/86 118 100 % -- -- -- --    11/06/24 0845 -- 78 14 164/84 117 -- -- -- -- --    11/06/24 0840 -- 80 14 167/88 121 98 % None (Room air) Lying -- --    11/06/24 0800 -- -- -- -- -- -- -- -- 14 No Pain    11/06/24 0700 98 °F (36.7 °C) 80 18 163/76 109 99 % None (Room air) Lying -- --    11/06/24 0645 -- -- -- 164/70 109 -- -- -- -- --    11/06/24 0630 -- -- -- 174/81 117 -- -- -- -- --    11/06/24 0615 -- -- -- 151/70 101 -- -- -- -- --    11/06/24 0600 -- -- -- 159/87 112 -- -- -- 15 --    11/06/24 0545 -- -- -- 155/74 106 -- -- -- -- --    11/06/24 0530 -- -- -- 167/80 116 -- -- -- -- --    11/06/24 0515 -- -- -- 184/91 129 -- -- -- -- --    11/06/24 0500 -- -- -- 179/94 130 -- -- -- 15 --    11/06/24 0445 -- -- -- 179/93 127 -- -- -- -- --    11/06/24 0430 -- -- -- 187/87 125 -- -- -- -- --    11/06/24 0415 -- -- -- 206/90 129 -- -- -- -- --    11/06/24 0400 -- -- -- 168/85 119 -- -- -- 15 --    11/06/24 0330 -- -- -- 177/86 124 -- --  -- -- --    11/06/24 0315 -- -- -- 186/88 127 -- -- -- -- --    11/06/24 0300 98.4 °F (36.9 °C) 79 20 156/76 109 98 % -- -- 15 --    11/06/24 0245 -- -- -- 150/72 104 -- -- -- -- --    11/06/24 0230 -- -- -- 148/73 103 -- -- -- -- --    11/06/24 0215 -- -- -- 165/89 121 -- -- -- -- --    11/06/24 0200 -- -- -- 168/79 114 -- -- -- 15 --    11/06/24 0145 -- -- -- 154/70 101 -- -- -- -- --    11/06/24 0130 -- -- -- 166/80 115 -- -- -- -- --    11/06/24 0116 -- -- -- 188/97 135 -- -- -- -- --    11/06/24 0115 -- -- -- 210/99 142 -- -- -- -- --    11/06/24 0100 -- -- -- 163/85 117 -- -- -- 15 --    11/06/24 0045 -- -- -- 175/85 122 -- -- -- -- --    11/06/24 0030 -- -- -- 175/93 128 -- -- -- -- --    11/06/24 0015 -- -- -- 173/95 127 -- -- -- -- --    11/06/24 0000 -- -- -- 170/89 122 -- -- -- 15 --    11/05/24 2345 -- -- -- 171/84 119 -- -- -- -- --    11/05/24 2330 -- -- -- 184/91 130 -- -- -- -- --    11/05/24 2315 -- -- -- 168/85 120 -- -- -- -- --    11/05/24 2300 -- 82 17 150/74 102 100 % -- -- 15 --    11/05/24 2245 -- -- -- 142/70 100 -- -- -- -- --    11/05/24 2230 -- -- -- 173/90 125 -- -- -- -- --    11/05/24 2215 -- -- -- 180/83 124 -- -- -- -- --    11/05/24 2200 -- -- -- 178/83 123 -- -- -- 15 --    11/05/24 2145 -- -- -- 182/86 123 -- -- -- -- --    11/05/24 2130 -- -- -- 180/88 126 -- -- -- -- --    11/05/24 2115 -- -- -- 178/86 124 -- -- -- -- --    11/05/24 2100 -- -- -- 176/84 120 -- -- -- 15 --    11/05/24 2045 -- -- -- 181/84 121 -- -- -- -- --    11/05/24 2037 -- -- -- 168/91 123 -- -- -- -- --    11/05/24 2025 98.3 °F (36.8 °C) 86 18 184/88 127 100 % -- Lying 15 No Pain    11/05/24 2012 -- 81 -- 163/81 -- -- -- -- -- --    11/05/24 2011 98.4 °F (36.9 °C) 84 20 163/81 115 99 % None (Room air) Lying -- --    11/05/24 1959 -- 83 -- 172/80 -- -- -- -- -- --    11/05/24 1945 -- 81 -- 170/81 -- 99 % None (Room air) -- -- --    11/05/24 1930 -- 81 18 168/81 -- -- None (Room air) -- 15 --    11/05/24 1915  -- 68 -- 208/100 -- -- -- -- -- --    11/05/24 1900 -- 67 -- 208/97 -- 100 % None (Room air) Lying 15 --    11/05/24 1845 -- 70 20 225/107 -- 99 % -- -- 15 --    11/05/24 1834 -- 64 20 216/105 -- 99 % -- -- -- --    11/05/24 1830 -- 67 20 216/105 -- 100 % -- -- 15 --    11/05/24 1815 -- 68 20 200/95 -- 99 % -- -- 15 --    11/05/24 1800 -- 67 20 219/102 -- 98 % -- -- 15 --    11/05/24 1745 -- 65 20 215/105 -- 98 % -- -- 15 --    11/05/24 1738 -- 64 20 219/104 -- 98 % -- -- -- --    11/05/24 1730 -- 65 20 224/107 154 98 % -- -- 15 --    11/05/24 1715 -- 68 20 204/97 139 98 % -- -- 15 --    11/05/24 1700 -- 66 20 226/104 149 99 % -- -- 15 --    11/05/24 1645 -- 77 18 211/103 -- 98 % -- Lying 15 --    11/05/24 1630 -- 68 18 214/104 -- 99 % -- -- 15 --    11/05/24 1615 -- 68 18 198/101 -- 98 % -- -- 15 --    11/05/24 1557 98.6 °F (37 °C) 72 18 215/100 -- 100 % None (Room air) Lying 15 No Pain              Pertinent Labs/Diagnostic Test Results:   Radiology:  XR chest portable ICU   Final Interpretation by Holden Washburn MD (11/06 1335)      No acute cardiopulmonary disease.            Workstation performed: [id number]         XR abdomen 1 view kub   Final Interpretation by Holden Washburn MD (11/06 1337)      No acute abnormality appreciated.               Workstation performed: [id number]         CT stroke alert brain   Final Interpretation by Justin Ho MD (11/05 3054)      No acute intracranial abnormality.      Chronic left posterior cerebellar, left corona radiata/centrum semiovale and bilateral basal ganglia lacunar infarcts. Chronic microangiopathic ischemic changes.            Workstation performed: [id number]         CTA stroke alert (head/neck)   Final Interpretation by Justin Ho MD (11/05 8387)      1. CTA head: Negative for large vessel intracranial occlusion or hemodynamically significant stenosis.      2. CTA neck: Mild left extracranial ICA stenosis. The  cervical vertebral arteries are patent.            Findings were communicated to Dr. Bryan Justice at 4:54 p.m.      Workstation performed: [id number]         MRI inpatient order    (Results Pending)     Cardiology:  Echo complete w/ contrast if indicated   Final Result by Neda Ventura MD (11/06 0951)        Left Ventricle: Left ventricular cavity size is normal. Wall thickness    is normal. The left ventricular ejection fraction is 65%. Systolic    function is normal. Wall motion is normal. Diastolic function is mildly    abnormal, consistent with grade I (abnormal) relaxation.     Right Ventricle: Right ventricular cavity size is normal. Systolic    function is normal.     Left Atrium: The atrium is mildly dilated.     Aortic Valve: The aortic valve is probably bicuspid. The leaflets are    not thickened. The leaflets are not calcified. The leaflets exhibit normal    mobility.     Mitral Valve: There is mild annular calcification.     Pericardium: There is a small pericardial effusion. There is no    echocardiographic evidence of tamponade. The evidence against tamponade    includes: no right ventricular diastolic collapse, no right ventricular    compression, no right atrial inversion and no respiratory variation.         ECG 12 lead   Final Result by Lyndon Buchanan MD (11/06 0936)   Normal sinus rhythm   Prolonged QT   Abnormal ECG   No previous ECGs available   Confirmed by Lyndon Buchanan ([id number]) on 11/6/2024 9:36:05 AM      Date/Time: 11/5/2024 4:08 PM  Indications / Diagnosis:  Stroke  ECG reviewed by me, the ED Provider: yes    Patient location:  ED  Previous ECG:     Previous ECG:  Compared to current    Similarity:  No change  Interpretation:     Interpretation: normal    Rate:     ECG rate:  70    ECG rate assessment: normal    Rhythm:     Rhythm: sinus rhythm    Ectopy:     Ectopy: none    QRS:     QRS axis:  Normal    QRS intervals:  Normal  Conduction:     Conduction: normal    ST segments:      ST segments:  Normal  T waves:     T waves: normal      Results from last 7 days   Lab Units 11/06/24  1214 11/06/24  0452 11/05/24 2034 11/05/24  1606   WBC Thousand/uL 6.45 5.16  --  5.71   HEMOGLOBIN g/dL 13.0 11.0*  --  12.0   HEMATOCRIT % 37.8 32.9*  --  35.9*   PLATELETS Thousands/uL 228 179 185 206   TOTAL NEUT ABS Thousands/µL 3.97 3.06  --  3.45     Results from last 7 days   Lab Units 11/06/24  1214 11/06/24  0452 11/05/24 2034 11/05/24  1606   SODIUM mmol/L 137 139  --  142   POTASSIUM mmol/L 4.3 3.8  --  4.5   CHLORIDE mmol/L 96 97  --  100   CO2 mmol/L 28 31  --  35*   ANION GAP mmol/L 13 11  --  7   BUN mg/dL 10 27*  --  25   CREATININE mg/dL 4.23* 9.75*  --  8.71*   EGFR ml/min/1.73sq m 16 5  --  6   CALCIUM mg/dL 9.0 8.4  --  8.1*   CALCIUM, IONIZED mmol/L 1.00* 1.02* 0.98*  --    MAGNESIUM mg/dL 2.0 2.4 2.5  --    PHOSPHORUS mg/dL 1.4* 4.0 3.6  --      Results from last 7 days   Lab Units 11/06/24  1334 11/06/24  1214 11/06/24 0452 11/05/24  1606   AST U/L  --  23 15 20   ALT U/L  --  14 11 13   ALK PHOS U/L  --  117* 98 105*   TOTAL PROTEIN g/dL  --  7.0 5.6* 6.1*   ALBUMIN g/dL  --  4.3 3.6 3.9   TOTAL BILIRUBIN mg/dL  --  0.81 0.55 0.59   BILIRUBIN DIRECT mg/dL  --  0.08  --   --    AMMONIA umol/L 19  --   --   --      Results from last 7 days   Lab Units 11/06/24  1140 11/06/24  0548 11/05/24  2336 11/05/24  1626   POC GLUCOSE mg/dl 120 154* 182* 170*     Results from last 7 days   Lab Units 11/06/24  1214 11/06/24  0452 11/05/24  1606   GLUCOSE RANDOM mg/dL 138 145* 176*     Results from last 7 days   Lab Units 11/05/24 2034   HEMOGLOBIN A1C % 6.9*   EAG mg/dl 151     BETA-HYDROXYBUTYRATE   Date Value Ref Range Status   11/26/2023 0.9 (H) <0.6 mmol/L Final   12/15/2020 0.1 <0.6 mmol/L Final   11/21/2020 4.4 (H) <0.6 mmol/L Final     Results from last 7 days   Lab Units 11/06/24  1334   PH JOE  7.436*   PCO2 JOE mm Hg 42.4   PO2 JOE mm Hg 34.0*   HCO3 JOE mmol/L 27.9   BASE EXC JOE mmol/L  3.3   O2 CONTENT JOE ml/dL 12.4   O2 HGB, VENOUS % 65.7     Results from last 7 days   Lab Units 11/06/24  1334 11/06/24  1214 11/05/24  2034 11/05/24  1810 11/05/24  1630   HS TNI 0HR ng/L  --  28  --   --  23   HS TNI 2HR ng/L 25  --   --  22  --    HSTNI D2 ng/L -3  --   --  -1  --    HS TNI 4HR ng/L  --   --  24  --   --    HSTNI D4 ng/L  --   --  1  --   --      Results from last 7 days   Lab Units 11/06/24  0452 11/05/24  1810   PROTIME seconds 13.8 14.1   INR  0.99 1.01   PTT seconds  --  32     Results from last 7 days   Lab Units 11/05/24 2034   TSH 3RD GENERATON uIU/mL 5.256*     Results from last 7 days   Lab Units 11/06/24  1214   LACTIC ACID mmol/L 1.4       Past Medical History:   Diagnosis Date    Acute kidney injury (HCC)     Ambulates with cane     Anuria     Anxiety     Cellulitis of right elbow 03/31/2021    Chronic kidney disease     Depression     Diabetes mellitus (HCC)     Diarrhea     Emesis 10/24/2020    End stage renal disease (HCC) 02/11/2018    Formatting of this note might be different from the original. Last Assessment & Plan:  Secondary to DM.  On nightly PD.  Followed by Nephro.  [facility] Formatting of this note might be different from the original. Last Assessment & Plan:  Formatting of this note might be different from the original. 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    Esophagitis 07/21/2015    Falls     Gastroparesis     GERD (gastroesophageal reflux disease)     History of shingles 2010    History of transfusion 02/2018    no adverse reaction    Hyperlipidemia     Hyperphosphatemia     Hypertension     Hypoglycemia 07/15/2022    Itching     Mastoiditis of right side 07/15/2022    Muscle weakness     general unsteadiness    Obesity (BMI 30.0-34.9) 09/09/2019    Orthostatic hypotension 10/25/2020    Peripheral polyneuropathy 11/20/2019    PONV (postoperative nausea and  vomiting) 01/26/2018    Protein-calorie malnutrition (HCC) 11/23/2020    Recurrent peritonitis (HCC) due to peritoneal dialysis catheter 07/31/2020    Retinopathy     Seizures (HCC)     early 2020 - one time    Skin abnormality     some dime size areas where skin was scratched from itching    Sleep apnea     Spontaneous bacterial peritonitis (HCC) 10/19/2020    Squamous cell skin cancer     left temple    Stroke (HCC)     x2 - off balance/no driving/fatigue    Swelling of both lower extremities     Traumatic onycholysis 07/21/2022    Vomiting     Wears glasses      Present on Admission:   Type 1 diabetes mellitus on insulin therapy (HCC)   Anemia in ESRD (end-stage renal disease)  ([facility])      Admitting Diagnosis: Dizziness [R42]  Tremor [R25.1]  ESRD on hemodialysis ([facility]) [N18.6, Z99.2]  Age/Sex: 39 y.o. male    Network Utilization Review Department  ATTENTION: Please call with any questions or concerns to [phone] and carefully listen to the prompts so that you are directed to the right person. All voicemails are confidential.   For Discharge needs, contact Care Management DC Support Team at [phone] opt. [phone]  Send all requests for admission clinical reviews, approved or denied determinations and any other requests to dedicated fax number below belonging to the campus where the patient is receiving treatment. List of dedicated fax numbers for the Facilities:  FACILITY NAME UR FAX NUMBER   ADMISSION DENIALS (Administrative/Medical Necessity) [phone]   DISCHARGE SUPPORT TEAM (NETWORK) [phone]   PARENT CHILD HEALTH (Maternity/NICU/Pediatrics) [phone]   [facility] [phone]   [facility] [phone]   [facility] [phone]   [facility] [phone]   [facility] [phone]   [facility] [phone]   [facility]  [phone]   [facility] [phone]   [facility] [phone]   [facility] [phone]   [facility] [phone]   [facility] [phone]

## 2024-11-06 NOTE — ASSESSMENT & PLAN NOTE
Lab Results   Component Value Date    EGFR 5 11/06/2024    EGFR 6 11/05/2024    EGFR 3 10/19/2024    CREATININE 9.75 (H) 11/06/2024    CREATININE 8.71 (H) 11/05/2024    CREATININE 13.55 (H) 10/19/2024       HD MWF  Minimal urine production at baseline.  Baseline Cr- 7-10    Plan:  Strict I/O  Monitor electrolytes and replete as needed  Continue dialysis schedule MWF  Nephrology following

## 2024-11-06 NOTE — PLAN OF CARE
Target UF Goal  3.7  L as tolerated to challenge edw by 1L. Patient dialyzing for 4 hours on 4 K bath for serum K of  3.8  per protocol. Treatment plan reviewed with Nephrology.

## 2024-11-06 NOTE — ASSESSMENT & PLAN NOTE
Patient evaluated in ED as stroke alert but found to be hypertensive around 220/105, persisted even after 10 mg hydralazine and 40 mg labetalol, nicardipine drip started in emergency department and patient admitted to the ICU.    Plan:  Continuous cardiac monitoring  Titrate nicardipine drip for systolic blood pressure around 180 in the first hour, may decrease another 10% (down to 160 systolic) in the following 23 hrs.  Monitor morning labs  Restart home antihypertensives

## 2024-11-06 NOTE — OCCUPATIONAL THERAPY NOTE
Occupational Therapy Cancelled Session    Patient Name: Mateus Mckeon  Today's Date: 11/6/2024 11/06/24 1047   Note Type   Note type Cancelled Session   Cancel Reasons Patient off floor/hemodialysis   Additional Comments OT orders recieved and chart review performed. Pt admitted w/ hypertensive emergency. Currently receiving HD. Will f/u and see as appropriate.     Leah Diaz, SVEN, OTR/L  [place] License [id number]  [place] License [id number]

## 2024-11-06 NOTE — ASSESSMENT & PLAN NOTE
Lab Results   Component Value Date    HGBA1C 6.9 (H) 11/05/2024       Recent Labs     11/05/24  1626 11/05/24  2336 11/06/24  0548   POCGLU 170* 182* 154*       Blood Sugar Average: Last 72 hrs:  (P) [id number]    Pt has glucose monitor and insulin pump in place, pt refuses removal unless necessary for MRI.    Plan:  Maintain euglycemia

## 2024-11-06 NOTE — ASSESSMENT & PLAN NOTE
Lab Results   Component Value Date    HGBA1C 6.2 (H) 07/16/2024       Recent Labs     11/05/24  1626   POCGLU 170*       Blood Sugar Average: Last 72 hrs:  (P) 170    Pt has glucose monitor and insulin pump in place, pt refuses removal unless necessary for MRI.    Plan:  Maintain euglycemia

## 2024-11-06 NOTE — PHYSICAL THERAPY NOTE
Physical Therapy Cancellation Note       11/06/24 1137   Note Type   Note type Cancelled Session   Cancel Reasons Patient off floor/hemodialysis   Additional Comments referral received for PT eval and tx. attempted to see pt for eval but he is receiving dialysis at bedside. will follow and initiate PT as medically appropriate and schedule allows.     Sony Hanson, PT

## 2024-11-06 NOTE — HEMODIALYSIS
Post-Dialysis RN Treatment Note    Blood Pressure:  Pre 167/88 mm/Hg  Post 170/88 mmHg   EDW  90 kg    Weight:  Pre 92.7 kg   Post 88.7 kg   Mode of weight measurement: Bed Scale   Volume Removed  2233 ml    Treatment duration 210 minutes    NS given  Yes, abdominal cramping    Treatment shortened? Yes, describe: pt restless and agitated, ended 30 minutes early per Dr. Welsh   Medications given during Rx Not Applicable   Estimated Kt/V  Not Applicable   Access type: AV fistula   Access Issues: No    Report called to primary nurse   Yes Susan Vazquez RN    Pt c/o abdominal cramping and became restless and sweaty mid tx, uf fluid removal turned off and nss bolus of 100 ml given. UF turned back on with lowered target goal of 3300 but symptoms persisted. Ok to end tx early for pt safety per Dr. Welsh.

## 2024-11-06 NOTE — ASSESSMENT & PLAN NOTE
In the outpatient setting, he is on Sensipar 120 mg daily and calcitriol 0.75 mcg with HD for 2HPT.  He is on Velphoro for hyperphosphatemia.  Phos is at goal.

## 2024-11-07 ENCOUNTER — APPOINTMENT (INPATIENT)
Dept: NEUROLOGY | Facility: HOSPITAL | Age: 39
DRG: 304 | End: 2024-11-07
Payer: MEDICARE

## 2024-11-07 ENCOUNTER — TELEPHONE (OUTPATIENT)
Age: 39
End: 2024-11-07

## 2024-11-07 PROBLEM — R47.89 WORD FINDING DIFFICULTY: Status: ACTIVE | Noted: 2024-11-05

## 2024-11-07 PROBLEM — G25.3 MYOCLONIC JERKING: Status: ACTIVE | Noted: 2024-11-07

## 2024-11-07 LAB
ANION GAP SERPL CALCULATED.3IONS-SCNC: 11 MMOL/L (ref 4–13)
BASOPHILS # BLD AUTO: 0.05 THOUSANDS/ÂΜL (ref 0–0.1)
BASOPHILS NFR BLD AUTO: 1 % (ref 0–1)
BUN SERPL-MCNC: 21 MG/DL (ref 5–25)
CALCIUM SERPL-MCNC: 9.1 MG/DL (ref 8.4–10.2)
CHLORIDE SERPL-SCNC: 97 MMOL/L (ref 96–108)
CO2 SERPL-SCNC: 31 MMOL/L (ref 21–32)
CREAT SERPL-MCNC: 7.91 MG/DL (ref 0.6–1.3)
EOSINOPHIL # BLD AUTO: 0.26 THOUSAND/ÂΜL (ref 0–0.61)
EOSINOPHIL NFR BLD AUTO: 6 % (ref 0–6)
ERYTHROCYTE [DISTWIDTH] IN BLOOD BY AUTOMATED COUNT: 13.2 % (ref 11.6–15.1)
GFR SERPL CREATININE-BSD FRML MDRD: 7 ML/MIN/1.73SQ M
GLUCOSE SERPL-MCNC: 126 MG/DL (ref 65–140)
GLUCOSE SERPL-MCNC: 179 MG/DL (ref 65–140)
GLUCOSE SERPL-MCNC: 180 MG/DL (ref 65–140)
GLUCOSE SERPL-MCNC: 211 MG/DL (ref 65–140)
GLUCOSE SERPL-MCNC: 224 MG/DL (ref 65–140)
HCT VFR BLD AUTO: 37.2 % (ref 36.5–49.3)
HGB BLD-MCNC: 12.2 G/DL (ref 12–17)
IMM GRANULOCYTES # BLD AUTO: 0.01 THOUSAND/UL (ref 0–0.2)
IMM GRANULOCYTES NFR BLD AUTO: 0 % (ref 0–2)
LYMPHOCYTES # BLD AUTO: 1.15 THOUSANDS/ÂΜL (ref 0.6–4.47)
LYMPHOCYTES NFR BLD AUTO: 28 % (ref 14–44)
MAGNESIUM SERPL-MCNC: 2.3 MG/DL (ref 1.9–2.7)
MCH RBC QN AUTO: 32.2 PG (ref 26.8–34.3)
MCHC RBC AUTO-ENTMCNC: 32.8 G/DL (ref 31.4–37.4)
MCV RBC AUTO: 98 FL (ref 82–98)
MONOCYTES # BLD AUTO: 0.42 THOUSAND/ÂΜL (ref 0.17–1.22)
MONOCYTES NFR BLD AUTO: 10 % (ref 4–12)
MRSA NOSE QL CULT: NORMAL
NEUTROPHILS # BLD AUTO: 2.29 THOUSANDS/ÂΜL (ref 1.85–7.62)
NEUTS SEG NFR BLD AUTO: 55 % (ref 43–75)
NRBC BLD AUTO-RTO: 0 /100 WBCS
PLATELET # BLD AUTO: 197 THOUSANDS/UL (ref 149–390)
PMV BLD AUTO: 10.4 FL (ref 8.9–12.7)
POTASSIUM SERPL-SCNC: 4.1 MMOL/L (ref 3.5–5.3)
PROLACTIN SERPL-MCNC: 12.74 NG/ML
RBC # BLD AUTO: 3.79 MILLION/UL (ref 3.88–5.62)
SODIUM SERPL-SCNC: 139 MMOL/L (ref 135–147)
WBC # BLD AUTO: 4.18 THOUSAND/UL (ref 4.31–10.16)

## 2024-11-07 PROCEDURE — 82948 REAGENT STRIP/BLOOD GLUCOSE: CPT

## 2024-11-07 PROCEDURE — 99233 SBSQ HOSP IP/OBS HIGH 50: CPT | Performed by: PSYCHIATRY & NEUROLOGY

## 2024-11-07 PROCEDURE — 80048 BASIC METABOLIC PNL TOTAL CA: CPT

## 2024-11-07 PROCEDURE — NC001 PR NO CHARGE: Performed by: INTERNAL MEDICINE

## 2024-11-07 PROCEDURE — 83735 ASSAY OF MAGNESIUM: CPT

## 2024-11-07 PROCEDURE — 99232 SBSQ HOSP IP/OBS MODERATE 35: CPT | Performed by: INTERNAL MEDICINE

## 2024-11-07 PROCEDURE — 84146 ASSAY OF PROLACTIN: CPT

## 2024-11-07 PROCEDURE — 95816 EEG AWAKE AND DROWSY: CPT

## 2024-11-07 PROCEDURE — 95816 EEG AWAKE AND DROWSY: CPT | Performed by: PSYCHIATRY & NEUROLOGY

## 2024-11-07 PROCEDURE — 85025 COMPLETE CBC W/AUTO DIFF WBC: CPT

## 2024-11-07 PROCEDURE — 97163 PT EVAL HIGH COMPLEX 45 MIN: CPT

## 2024-11-07 PROCEDURE — 97116 GAIT TRAINING THERAPY: CPT

## 2024-11-07 RX ORDER — GABAPENTIN 100 MG/1
200 CAPSULE ORAL
Status: DISCONTINUED | OUTPATIENT
Start: 2024-11-07 | End: 2024-11-10 | Stop reason: HOSPADM

## 2024-11-07 RX ORDER — INSULIN LISPRO 100 [IU]/ML
1-6 INJECTION, SOLUTION INTRAVENOUS; SUBCUTANEOUS
Status: DISCONTINUED | OUTPATIENT
Start: 2024-11-07 | End: 2024-11-07

## 2024-11-07 RX ORDER — GABAPENTIN 100 MG/1
100 CAPSULE ORAL DAILY
Status: DISCONTINUED | OUTPATIENT
Start: 2024-11-08 | End: 2024-11-10 | Stop reason: HOSPADM

## 2024-11-07 RX ORDER — GABAPENTIN 100 MG/1
100 CAPSULE ORAL 2 TIMES DAILY
Status: DISCONTINUED | OUTPATIENT
Start: 2024-11-07 | End: 2024-11-07

## 2024-11-07 RX ORDER — CINACALCET 30 MG/1
120 TABLET, FILM COATED ORAL
Status: DISCONTINUED | OUTPATIENT
Start: 2024-11-08 | End: 2024-11-10 | Stop reason: HOSPADM

## 2024-11-07 RX ORDER — INSULIN LISPRO 100 [IU]/ML
1-5 INJECTION, SOLUTION INTRAVENOUS; SUBCUTANEOUS
Status: DISCONTINUED | OUTPATIENT
Start: 2024-11-07 | End: 2024-11-07

## 2024-11-07 RX ORDER — ONDANSETRON 2 MG/ML
4 INJECTION INTRAMUSCULAR; INTRAVENOUS EVERY 4 HOURS PRN
Status: DISCONTINUED | OUTPATIENT
Start: 2024-11-07 | End: 2024-11-10 | Stop reason: HOSPADM

## 2024-11-07 RX ORDER — CALCITRIOL 0.25 UG/1
0.75 CAPSULE, LIQUID FILLED ORAL 3 TIMES WEEKLY
Status: DISCONTINUED | OUTPATIENT
Start: 2024-11-08 | End: 2024-11-10 | Stop reason: HOSPADM

## 2024-11-07 RX ADMIN — HEPARIN SODIUM 5000 UNITS: 5000 INJECTION INTRAVENOUS; SUBCUTANEOUS at 13:47

## 2024-11-07 RX ADMIN — NIFEDIPINE 90 MG: 30 TABLET, FILM COATED, EXTENDED RELEASE ORAL at 08:11

## 2024-11-07 RX ADMIN — ESCITALOPRAM OXALATE 20 MG: 20 TABLET ORAL at 08:10

## 2024-11-07 RX ADMIN — CHLORHEXIDINE GLUCONATE 15 ML: 1.2 RINSE ORAL at 08:11

## 2024-11-07 RX ADMIN — CLOPIDOGREL BISULFATE 75 MG: 75 TABLET ORAL at 08:10

## 2024-11-07 RX ADMIN — GABAPENTIN 100 MG: 100 CAPSULE ORAL at 09:32

## 2024-11-07 RX ADMIN — LOSARTAN POTASSIUM 100 MG: 50 TABLET, FILM COATED ORAL at 08:10

## 2024-11-07 RX ADMIN — FAMOTIDINE 10 MG: 20 TABLET ORAL at 08:10

## 2024-11-07 RX ADMIN — HEPARIN SODIUM 5000 UNITS: 5000 INJECTION INTRAVENOUS; SUBCUTANEOUS at 05:33

## 2024-11-07 RX ADMIN — LABETALOL HYDROCHLORIDE 400 MG: 200 TABLET, FILM COATED ORAL at 13:47

## 2024-11-07 RX ADMIN — ASPIRIN 81 MG 81 MG: 81 TABLET ORAL at 08:10

## 2024-11-07 RX ADMIN — ONDANSETRON 4 MG: 2 INJECTION INTRAMUSCULAR; INTRAVENOUS at 06:00

## 2024-11-07 RX ADMIN — LABETALOL HYDROCHLORIDE 400 MG: 200 TABLET, FILM COATED ORAL at 05:33

## 2024-11-07 NOTE — PHYSICAL THERAPY NOTE
PHYSICAL THERAPY EVALUATION NOTE    Patient Name: Mateus Mckeon  Today's Date: 11/7/2024  AGE:   39 y.o.  Mrn:   [id number]  ADMIT DX:  Dizziness [R42]  Tremor [R25.1]  ESRD on hemodialysis (HCC) [N18.6, Z99.2]    Past Medical History:   Diagnosis Date    Acute kidney injury (HCC)     Ambulates with cane     Anuria     Anxiety     Cellulitis of right elbow 03/31/2021    Chronic kidney disease     Depression     Diabetes mellitus (HCC)     Diarrhea     Emesis 10/24/2020    End stage renal disease (HCC) 02/11/2018    Formatting of this note might be different from the original. Last Assessment & Plan:  Secondary to DM.  On nightly PD.  Followed by Nephro.  [facility] Formatting of this note might be different from the original. Last Assessment & Plan:  Formatting of this note might be different from the original. 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    Esophagitis 07/21/2015    Falls     Gastroparesis     GERD (gastroesophageal reflux disease)     History of shingles 2010    History of transfusion 02/2018    no adverse reaction    Hyperlipidemia     Hyperphosphatemia     Hypertension     Hypoglycemia 07/15/2022    Itching     Mastoiditis of right side 07/15/2022    Muscle weakness     general unsteadiness    Obesity (BMI 30.0-34.9) 09/09/2019    Orthostatic hypotension 10/25/2020    Peripheral polyneuropathy 11/20/2019    PONV (postoperative nausea and vomiting) 01/26/2018    Protein-calorie malnutrition (HCC) 11/23/2020    Recurrent peritonitis (HCC) due to peritoneal dialysis catheter 07/31/2020    Retinopathy     Seizures (HCC)     early 2020 - one time    Skin abnormality     some dime size areas where skin was scratched from itching    Sleep apnea     Spontaneous bacterial peritonitis (HCC) 10/19/2020    Squamous cell skin  cancer     left temple    Stroke (HCC)     x2 - off balance/no driving/fatigue    Swelling of both lower extremities     Traumatic onycholysis 07/21/2022    Vomiting     Wears glasses      Length Of Stay: 2  PHYSICAL THERAPY EVALUATION :    11/07/24 1133   PT Last Visit   PT Visit Date 11/07/24   Pain Assessment   Pain Assessment Tool 0-10   Pain Score No Pain   Restrictions/Precautions   Other Precautions Telemetry;Fall Risk   Home Living   Type of Home House   Home Layout Two level;1/2 bath on main level;Bed/bath upstairs;Other (Comment)  (3 NIRU.)   Additional Comments lives w/ parents. ambulates w/ furniture support in home, single point cane outdoors, and rollator in the community. independent w/ ADLs. shares IADLs. no falls in last 6 months. pt was receiving outpatient PT after previous CVA.   General   Additional Pertinent History 11/7/24 at 00:31 glucose was 224   Family/Caregiver Present Yes   Cognition   Arousal/Participation Alert   Orientation Level Oriented to person;Other (Comment)  (pt was identified w/ full name, birth date)   Following Commands Follows one step commands with increased time or repetition   Comments room air resting pulse ox 100% and 70 BPM, active 94% and 89 BPM. seated blood pressure 152/80.  (mild intention tremor B UEs. cervical active ROM WFL, lightheadedness was worse w/ extension.)   Subjective   Subjective pt seen sitting out of bed w/ mom present. pt agreed to PT eval. denied pain. states feeling dizzy, further described as lightheaded.   RLE Assessment   RLE Assessment WFL  (3+ to 4-/5)   LLE Assessment   LLE Assessment WFL  (3+ to 4-/5)   Vision-Basic Assessment   Current Vision Wears glasses for distance only   Coordination   Sensation X  (light touch impaired bilateral feet)   Finger to Nose & Finger to Finger  Intact   Heel to [place] Intact   Transfers   Sit to Stand 6  Modified independent   Additional items Increased time required   Stand to Sit 6  Modified independent    Additional items Increased time required   Ambulation/Elevation   Gait pattern Wide COLBY;Foward flexed;Short stride;Inconsistent kailee  (lack of UE swing during ambulation)   Gait Assistance 5  Supervision   Additional items Verbal cues  (for full step length)   Assistive Device Straight cane   Distance 80 feet, 60 feet w/ standing rest break  (additional ambulation not possible due to fatigue)   Stair Management Assistance 5  Supervision   Additional items Increased time required   Stair Management Technique One rail L;Step to pattern   Number of Stairs 6  (additional stair use not possible due to fatigue)   Ambulation/Elevation Additional Comments Comfortable Gait Speed: 0.50 m/s   Balance   Static Sitting Good   Dynamic Sitting Fair   Static Standing Fair +  (w/ cane)   Ambulatory Fair -  (w/ cane)   Activity Tolerance   Activity Tolerance Patient limited by fatigue   Nurse Made Aware spoke to Merle Wright CM   Assessment   Problem List Decreased strength;Decreased endurance;Impaired balance;Decreased mobility;Decreased safety awareness;Impaired sensation;Impaired tone   Assessment Pt presents due to dizziness. Pt has noticed that his blood pressure was abnormally high. Dx: stroke like symptoms, ESRD, DM, HTN, and anemia. order placed for PT eval and tx, w/ activity order of fall precautions and out of bed to chair. pt presents w/ comorbidities of DM, CVA, ESRD, hyperlipidemia, protein calorie malnutrition, and HTN and personal factors of . pt presents w/ weakness, decreased endurance, impaired balance, gait deviations, altered sensation, impaired tone, decreased safety awareness, and fall risk. these impairments are evident in findings from physical examination (weakness, altered sensation, and impaired tone), mobility assessment (need for standby assist w/ all phases of mobility when usually mobilizing independently, tolerance to only 80 feet of ambulation, and need for cueing for mobility technique),  and Barthel Index: 80/100 and Comfortable Gait Speed: 0.50 m/s (less than 1.0 m/s indicates need for intervention to address falls risk). pt needed input for mobility technique. pt is at risk for falls due to physical and safety awareness deficits. pt's clinical presentation is unstable/unpredictable (evident in poor blood sugar control, need for standby assist w/ all phases of mobility when usually mobilizing independently, tolerance to only 80 feet of ambulation, lightheadedness impacting overall mobility status, and need for input for mobility technique/safety). pt needs inpatient PT tx to improve mobility deficits and progress mobility training as appropriate   Goals   Patient Goals I want to get back to normal   STG Expiration Date 11/21/24   Short Term Goal #1 pt will: Increase bilateral LE strength 1/2 grade to facilitate independent mobility, Perform all transfers independently to improve independence, Ambulate 300 ft. with least restrictive assistive device independently w/o LOB to improve functional independence, Navigate 10 stair(s) independently with unilateral handrail to facilitate return to previous living environment, Increase ambulatory balance 1 grade to decrease risk for falls, Complete exercise program independently to increase strength and endurance, Tolerate 3 hr OOB to faciliate upright tolerance, Improve gait speed to 0.60 m/s to reduce fall risk and increase independence, and Improve Barthel Index score to 100 to facilitate independence   PT Treatment Day 1   Plan   Treatment/Interventions Functional transfer training;LE strengthening/ROM;Elevations;Therapeutic exercise;Endurance training;Gait training;Equipment eval/education;Patient/family training;Compensatory technique education   PT Frequency 1-2x/wk   Discharge Recommendation   Rehab Resource Intensity Level, PT III (Minimum Resource Intensity)   AM-PAC Basic Mobility Inpatient   Turning in Flat Bed Without Bedrails 4   Lying on Back  to Sitting on Edge of Flat Bed Without Bedrails 4   Moving Bed to Chair 3   Standing Up From Chair Using Arms 3   Walk in Room 3   Climb 3-5 Stairs With Railing 3   Basic Mobility Inpatient Raw Score 20   Basic Mobility Standardized Score 43.99   [facility] Highest Level Of Mobility   -HL Goal 6: Walk 10 steps or more   -HLM Achieved 8: Walk 250 feet ot more   Barthel Index   Feeding 10   Bathing 0   Grooming Score 5   Dressing Score 10   Bladder Score 10   Bowels Score 10   Toilet Use Score 10   Transfers (Bed/Chair) Score 10   Mobility (Level Surface) Score 10   Stairs Score 5   Barthel Index Score 80   Additional Treatment Session   Start Time 1133   End Time 1143   Treatment Assessment Pt agreed to participate in PT intervention. Pt completed additional mobilization to address physical and mobility deficits noted during eval. Therapist provided education to pt including cane management and appropriate ambulation pattern. Sit < - > stand transfer w/ supervision. Ambulated 160 feet w/ cane w/ supervision. Additional ambulation was not possible due to fatigue. Pt shows improvement w/ increased activity tolerance but continued to need the same level of assistance. Further inpatient PT intervention is necessary to decrease fall risk and maximize functional independence.   Equipment Use single point cane   Additional Treatment Day 1   End of Consult   Patient Position at End of Consult Bedside chair;All needs within reach     The patient's AM-PAC Basic Mobility Inpatient Short Form Raw Score is 20. A Raw score of greater than 16 suggests the patient may benefit from discharge to home. Please also refer to the recommendation of the Physical Therapist for safe discharge planning.    Comfortable Gait Speed: 0.50 m/s  Gait Speed Interpretation:  Gain of 0.1 m/s is a predictor of well-being in those w/ abnormal walking speed compared to age matched peers  <0.7m/s is at an increased risk for falls    Household  ambulator: <0.4 m/s  Limited community ambulator: 0.4-0.8 m/s  Community ambulator: 0.8-1.2 m/s  Able to safely cross streets: >1.2 m/s    Skilled PT recommended while in hospital and upon DC to progress pt toward treatment goals.     Sony Hanson, PT

## 2024-11-07 NOTE — ASSESSMENT & PLAN NOTE
Lab Results   Component Value Date    EGFR 16 11/06/2024    EGFR 5 11/06/2024    EGFR 6 11/05/2024    CREATININE 4.23 (H) 11/06/2024    CREATININE 9.75 (H) 11/06/2024    CREATININE 8.71 (H) 11/05/2024       HD MWF  Minimal urine production at baseline.  Baseline Cr- 7-10    Plan:  Strict I/O  Monitor electrolytes and replete as needed  Continue dialysis schedule MWF  Nephrology following

## 2024-11-07 NOTE — ASSESSMENT & PLAN NOTE
In the outpatient setting, he is on Sensipar 120 mg daily and calcitriol 0.75 mcg with HD for 2HPT.  He is on Velphoro for hyperphosphatemia.  Restart Sensipar and Calcitriol.   Phos is at goal.

## 2024-11-07 NOTE — PROGRESS NOTES
Progress Note - Neurology   Mateus Mckeon 39 y.o. male [id number]  Unit/Bed#: ICU 02/ICU 02    Assessment/Plan:  Word finding difficulty  Assessment & Plan  39 y.o. 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     BP on presentation 215/100, SBP as high as 226. NIHSS: 3 (baseline deficits including R lower quadrant VF deficit and RUE numbness, and RUE ataxia). Initial CT imaging unremarkable for acute intracranial findings. Patient was not a TNK candidate due to unclear time of onset outside appropriate time window (LKW 8:30 am).    Workup:  - CT head:   Unremarkable for acute intracranial abnormalities  Chronic left posterior cerebellar, left corona radiata/centrum semiovale, bilateral basal ganglia lacunar infarcts noted  - CTA head and neck:  Unremarkable for large vessel occlusion or critical stenosis  Mild left extracranial ICA stenosis noted  - MRI brain wo negative for acute intracranial abnormalities, unchanged scattered hemosiderosis  - Echo: EF 65%, mildly dilated L atrium  - Lipid panel: Cholesterol 102, LDL 47  - Hemoglobin A1c: 6.9    Patient continues to report word finding difficulty, however this is not appreciable on exam. Neuroimaging negative for acute ischemia, do not suspect TIA as symptoms present > 24 hrs. Consider recrudescence of prior stroke given reported dizziness and word finding difficulties that patient reports feels similar to prior stroke symptoms.  Recommend additional workup for toxic/metabolic etiologies.    Plan:  - No  "need for stroke pathway  - s/p Aspirin 244 mg x 1 (patient reports taking home ASA 81 mg on 11/5) and Plavix 300 mg x 1  - Continue with Aspirin 81 mg and Atorvastatin 40 mg qHS (home medications).  - Okay to discontinue Plavix 75 mg  - Goal normotension, avoid hypotension  - Cardene gtt discontinued 11/6  - Therapies as needed  - Medical management and supportive care per primary team. Correction of any metabolic or infectious disturbances.     Myoclonic jerking  Assessment & Plan  - Patient has had witnessed myoclonic jerking noted since admission. He has a history of one provoked seizure in the setting of HTN and uremic encephalopathy (February 2020) . Per Dr. Graves, there is no diagnosis of underlying seizure disorder or epilepsy.  ICU night shift (11/5-11/6) described tonic clonic movements with a mild post ictal period to day team, which was then passed onto neurology. No further documentation at this time to review details of episode  Patient became diaphoretic, restless and confused during HD on 11/6. Episode lasted ~ 30 minutes and he returned to baseline.  On my re-evaluation in the afternoon of patient s/p HD, he appeared calm, awake and alert (similar to the morning) with diaphoresis. Case was discussed with ICU team and patient's mother.     Plan:  - Routine EEG:  \"This Routine EEG recorded during wakefulness and drowsiness is abnormal. A single right occipital/posterior temporal spike wave discharge suggests a possible source of seizures. Diffuse theta and delta activities suggest mild nonspecific diffuse cerebral dysfunction\".   - Given history of one provoked seizure, rEEG findings and reported tonic clonic movement by night shift ICU, reasonable to consider AED initiation if patient is agreeable. Will discuss with attending neurologist  - Ammonia WNL      Plans above discussed with Attending Neurologist, please see attestation for further input/recommendations.     Mateus Mckeon will need " follow-up in in 6 weeks with epilepsy team for Other in 60 minute appointment. They will not require outpatient neurological testing.   Message sent to schedulers.       Subjective:   Patient seen resting comfortably in bed.  He states he feels better than yesterday and the day of admission.  He continues to report word finding difficulty,however not appreciable on exam.  He denied dizziness.  He reports he has had ongoing myoclonic jerking a few times every week.  I reviewed his MRI brain results with him and discussed plan of care moving forward.    Past Medical History:   Diagnosis Date    Acute kidney injury (HCC)     Ambulates with cane     Anuria     Anxiety     Cellulitis of right elbow 03/31/2021    Chronic kidney disease     Depression     Diabetes mellitus (HCC)     Diarrhea     Emesis 10/24/2020    End stage renal disease (HCC) 02/11/2018    Formatting of this note might be different from the original. Last Assessment & Plan:  Secondary to DM.  On nightly PD.  Followed by Nephro.  [facility] Formatting of this note might be different from the original. Last Assessment & Plan:  Formatting of this note might be different from the original. 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    Esophagitis 07/21/2015    Falls     Gastroparesis     GERD (gastroesophageal reflux disease)     History of shingles 2010    History of transfusion 02/2018    no adverse reaction    Hyperlipidemia     Hyperphosphatemia     Hypertension     Hypoglycemia 07/15/2022    Itching     Mastoiditis of right side 07/15/2022    Muscle weakness     general unsteadiness    Obesity (BMI 30.0-34.9) 09/09/2019    Orthostatic hypotension 10/25/2020    Peripheral polyneuropathy 11/20/2019    PONV (postoperative nausea and vomiting) 01/26/2018    Protein-calorie malnutrition (HCC) 11/23/2020    Recurrent peritonitis (HCC) due to peritoneal dialysis  catheter 07/31/2020    Retinopathy     Seizures (HCC)     early 2020 - one time    Skin abnormality     some dime size areas where skin was scratched from itching    Sleep apnea     Spontaneous bacterial peritonitis (HCC) 10/19/2020    Squamous cell skin cancer     left temple    Stroke (HCC)     x2 - off balance/no driving/fatigue    Swelling of both lower extremities     Traumatic onycholysis 07/21/2022    Vomiting     Wears glasses      Past Surgical History:   Procedure Laterality Date    CARDIAC ELECTROPHYSIOLOGY PROCEDURE N/A 9/21/2023    Procedure: Cardiac loop recorder explant;  Surgeon: Parish Morgan MD;  Location: [facility];  Service: Cardiology    CARDIAC LOOP RECORDER  05/2018    COLONOSCOPY      EGD      EYE SURGERY Right     HEMODIALYSIS ADULT  11/6/2024    IR AV FISTULAGRAM/GRAFTOGRAM  02/23/2021    IR CEREBRAL ANGIOGRAPHY  1/12/2024    IR CEREBRAL ANGIOGRAPHY / INTERVENTION  1/5/2024    IR TUNNELED CENTRAL LINE PLACEMENT  02/16/2021    IR TUNNELED DIALYSIS CATHETER PLACEMENT  11/18/2020    IR TUNNELED DIALYSIS CATHETER REMOVAL  02/12/2021    IR TUNNELED DIALYSIS CATHETER REMOVAL  03/11/2021    MOHS SURGERY Left 12/14/2022    Left temple with Dr. Hassan    PERITONEAL CATHETER INSERTION N/A 08/27/2018    Procedure: UNROOF PD CATHETER;  Surgeon: Felipe Lindo DO;  Location: [facility];  Service: General    NE ARTERIOVENOUS ANASTOMOSIS OPEN DIRECT Left 11/09/2020    Procedure: CREATION FISTULA  ARTERIOVENOUS (AV) - LEFT WRIST;  Surgeon: Placido Altamirano MD;  Location: [facility];  Service: Vascular    NE ESOPHAGOGASTRODUODENOSCOPY TRANSORAL DIAGNOSTIC N/A 04/18/2019    Procedure: ESOPHAGOGASTRODUODENOSCOPY (EGD);  Surgeon: Ale Figueroa MD;  Location: [facility];  Service: Gastroenterology    NE LAPS INSERTION TUNNELED INTRAPERITONEAL CATHETER N/A 08/06/2018    Procedure: LAPAROSCOPIC PD CATHETER PLACEMENT;  Surgeon: Felipe Lindo DO;  Location: [facility];  Service: General    NE REMOVAL  TUNNELED INTRAPERITONEAL CATHETER N/A 2020    Procedure: REMOVAL CATHETER PERITONEAL DIALYSIS;  Surgeon: Abdifatah Ty MD;  Location: [facility];  Service: General    TONSILLECTOMY      UPPER GASTROINTESTINAL ENDOSCOPY       Family History   Problem Relation Age of Onset    Breast cancer Mother     Hypertension Mother     Hyperlipidemia Father     Hypertension Father     Leukemia Maternal Grandmother     Hyperlipidemia Maternal Grandfather     Hypertension Maternal Grandfather     Hyperlipidemia Paternal Grandmother     Hypertension Paternal Grandmother     Heart disease Paternal Grandfather         cardiac disorder    Diabetes Paternal Grandfather      Social History     Socioeconomic History    Marital status: Single     Spouse name: None    Number of children: None    Years of education: None    Highest education level: None   Occupational History    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   Tobacco Use    Smoking status: Former     Current packs/day: 0.00     Average packs/day: 0.5 packs/day for 12.0 years (6.0 ttl pk-yrs)     Types: Cigarettes     Start date: 2006     Quit date: 2018     Years since quittin.7    Smokeless tobacco: Never    Tobacco comments:     quit 2018   Vaping Use    Vaping status: Every Day    Substances: THC, CBD   Substance and Sexual Activity    Alcohol use: Not Currently    Drug use: Yes     Types: Marijuana     Comment: medical marijuana last vaped 2024    Sexual activity: None   Other Topics Concern    None   Social History Narrative    Caffeine use    single     Social Determinants of Health     Financial Resource Strain: Low Risk  (2023)    Overall Financial Resource Strain (CARDIA)     Difficulty of Paying Living Expenses: Not hard at all   Food Insecurity: No Food Insecurity (2024)    Nursing - Inadequate Food Risk Classification     Worried About Running Out of Food in the Last Year: Never true     Ran Out of Food in the Last Year:  "Never true     Ran Out of Food in the Last Year: Not on file   Transportation Needs: No Transportation Needs (5/17/2024)    PRAPARE - Transportation     Lack of Transportation (Medical): No     Lack of Transportation (Non-Medical): No   Physical Activity: Not on file   Stress: Not on file   Social Connections: Unknown (6/18/2024)    Received from [employer]     How often do you feel lonely or isolated from those around you? (Adult - for ages 18 years and over): Not on file   Intimate Partner Violence: Not on file   Housing Stability: Low Risk  (5/17/2024)    Housing Stability Vital Sign     Unable to Pay for Housing in the Last Year: No     Number of Times Moved in the Last Year: 0     Homeless in the Last Year: No       Medications: Reviewed in detail by me.    ROS: Review of Systems   Constitutional:  Negative for diaphoresis and fatigue.   Eyes:  Negative for photophobia and visual disturbance.   Respiratory:  Negative for cough and shortness of breath.    Cardiovascular:  Negative for chest pain and palpitations.   Gastrointestinal:  Negative for nausea and vomiting.   Musculoskeletal:  Negative for back pain and neck pain.   Skin:  Negative for color change and pallor.   Neurological:  Positive for speech difficulty. Negative for dizziness, tremors, facial asymmetry, weakness, light-headedness and headaches.        Peripheral neuropathy, negative myoclonus seen   Psychiatric/Behavioral:  Negative for confusion. The patient is not nervous/anxious.         Vitals: /80   Pulse 79   Temp 98.4 °F (36.9 °C) (Oral)   Resp 18   Ht 5' 11\" (1.803 m)   Wt 94.8 kg (208 lb 15.9 oz)   SpO2 99%   BMI 29.15 kg/m²     Physical Exam:   Physical Exam  Vitals reviewed.   Constitutional:       General: He is not in acute distress.     Appearance: Normal appearance. He is normal weight. He is not ill-appearing.   HENT:      Head: Normocephalic and atraumatic.      Right Ear: External ear normal.     "  Left Ear: External ear normal.      Nose: Nose normal.      Mouth/Throat:      Mouth: Mucous membranes are moist.   Eyes:      General:         Right eye: No discharge.         Left eye: No discharge.      Extraocular Movements: Extraocular movements intact and EOM normal.      Conjunctiva/sclera: Conjunctivae normal.      Pupils: Pupils are equal, round, and reactive to light.   Cardiovascular:      Rate and Rhythm: Normal rate.   Pulmonary:      Effort: Pulmonary effort is normal. No respiratory distress.   Musculoskeletal:         General: Normal range of motion.      Right lower leg: No edema.      Left lower leg: No edema.   Skin:     General: Skin is warm and dry.      Coloration: Skin is not jaundiced or pale.   Neurological:      Mental Status: He is alert and oriented to person, place, and time.      Motor: Motor strength is normal.     Coordination: Finger-Nose-Finger Test and Heel to Shin Test normal.      Comments: See below   Psychiatric:         Mood and Affect: Mood normal.         Speech: Speech normal.         Behavior: Behavior normal.       Neurologic Exam     Mental Status   Oriented to person, place, and time.   Follows 2 step commands.   Attention: normal. Concentration: normal.   Speech: speech is normal   Level of consciousness: alert  Able to name object. Able to repeat. Normal comprehension.     Cranial Nerves     CN III, IV, VI   Pupils are equal, round, and reactive to light.  Extraocular motions are normal.   Nystagmus: none   Diplopia: none  Conjugate gaze: present    CN V   Facial sensation intact.     CN VII   Facial expression full, symmetric.     CN VIII   CN VIII normal.     CN XII   CN XII normal.     Right lower quadrant VF deficit noted, baseline from prior strokes     Motor Exam   Muscle bulk: normal    Strength   Strength 5/5 throughout.   Absent pronator drift bilaterally, however negative myoclonus noted during testing L>R     Sensory Exam     Length dependent neuropathy  present in BLE. Light touch sensation decreased in RUE when compared to LUE     Gait, Coordination, and Reflexes     Coordination   Finger to nose coordination: normal  Heel to shin coordination: normal    Tremor   Resting tremor: absent  Intention tremor: present      Labs: Reviewed in detail by me.     Imaging: I have personally reviewed pertinent imaging and PACS reports.     VTE Prophylaxis: Heparin      Please note: This note has been constructed using a voice recognition system

## 2024-11-07 NOTE — ASSESSMENT & PLAN NOTE
"- Patient has had witnessed myoclonic jerking noted since admission. He has a history of one provoked seizure in the setting of HTN and uremic encephalopathy (February 2020) . Per Dr. Graves, there is no diagnosis of underlying seizure disorder or epilepsy.  ICU night shift (11/5-11/6) described tonic clonic movements with a mild post ictal period to day team, which was then passed onto neurology. No further documentation at this time to review details of episode  Patient became diaphoretic, restless and confused during HD on 11/6. Episode lasted ~ 30 minutes and he returned to baseline.  On my re-evaluation in the afternoon of patient s/p HD, he appeared calm, awake and alert (similar to the morning) with diaphoresis. Case was discussed with ICU team and patient's mother.     Plan:  - Routine EEG:  \"This Routine EEG recorded during wakefulness and drowsiness is abnormal. A single right occipital/posterior temporal spike wave discharge suggests a possible source of seizures. Diffuse theta and delta activities suggest mild nonspecific diffuse cerebral dysfunction\".   - Given history of one provoked seizure, rEEG findings and reported tonic clonic movement by night shift ICU, reasonable to consider AED initiation if patient is agreeable. Will discuss with attending neurologist  - Carlos WNL  "

## 2024-11-07 NOTE — ASSESSMENT & PLAN NOTE
Noted to have accelerated hypertension on presentation.  Home medications per MAR: Labetalol 400 mg TID, Nifedipine 90 mg OD, Olmesartan 40 mg OD - confirmed with mother.   S/p Cardene drip.   BP better overall - back on usual home meds   Lower EDW to 88.5 kg.

## 2024-11-07 NOTE — ASSESSMENT & PLAN NOTE
Patient evaluated in ED as stroke alert but found to be hypertensive around 220/105, persisted even after 10 mg hydralazine and 40 mg labetalol, nicardipine drip started in emergency department and patient admitted to the ICU.    Plan:  Continuous cardiac monitoring  Nicardipine drip Dc'd  Can return patient's blood pressure to baseline as there is no acute intracranial pathologies and it has been greater than 24 hours since initial hypertension  Monitor morning labs  Restarted home antihypertensives

## 2024-11-07 NOTE — ASSESSMENT & PLAN NOTE
Lab Results   Component Value Date    EGFR 16 11/06/2024    EGFR 5 11/06/2024    EGFR 6 11/05/2024    CREATININE 4.23 (H) 11/06/2024    CREATININE 9.75 (H) 11/06/2024    CREATININE 8.71 (H) 11/05/2024   Continue to monitor CBCs  Hemoglobin has been stable  Patient does not take any erythropoietin stimulating medications at home

## 2024-11-07 NOTE — ASSESSMENT & PLAN NOTE
[facility] MWF.  Access: Left arm AVF.  EDW: 90 kg.  Stable electrolytes.  Weight down to 88.7 kg after dialysis yesterday.  Next HD is tomorrow.

## 2024-11-07 NOTE — PROGRESS NOTES
Progress Note - Critical Care/ICU   Name: Mateus Mckeon 39 y.o. male I MRN: [id number]  Unit/Bed#: ICU 02 I Date of Admission: 11/5/2024   Date of Service: 11/7/2024 I Hospital Day: 2      Assessment & Plan  ESRD on hemodialysis ([facility])  Lab Results   Component Value Date    EGFR 16 11/06/2024    EGFR 5 11/06/2024    EGFR 6 11/05/2024    CREATININE 4.23 (H) 11/06/2024    CREATININE 9.75 (H) 11/06/2024    CREATININE 8.71 (H) 11/05/2024       HD MWF  Minimal urine production at baseline.  Baseline Cr- 7-10    Plan:  Strict I/O  Monitor electrolytes and replete as needed  Continue dialysis schedule MWF  Nephrology following  Type 1 diabetes mellitus on insulin therapy ([facility])  Lab Results   Component Value Date    HGBA1C 6.9 (H) 11/05/2024       Recent Labs     11/06/24  1140 11/06/24  1750 11/07/24  0031 11/07/24  0157   POCGLU 120 186* 224* 179*       Blood Sugar Average: Last 72 hrs:  (P) [id number]    Pt has glucose monitor and insulin pump in place, pt refuses removal unless necessary for MRI.    Plan:  Maintain euglycemia  Stroke-like symptoms  Pt presented to ED with dizziness which is similar to previous CVA symptoms, stroke alert called, imaging largely unremarkable.  Admission on stroke pathway for follow-up MRI.    Plan:  Q1h neuro checks  MRI showed no acute intracranial process  A1c, Lipid panel, TSH  81 mg ASA daily  75 mg Plavix daily  Follow-up echo  Continuous tele  PT/OT evaluation  STAT head CT for any neurologic changes  EEG to evaluate for seizure-like activity    Hypertensive emergency  Patient evaluated in ED as stroke alert but found to be hypertensive around 220/105, persisted even after 10 mg hydralazine and 40 mg labetalol, nicardipine drip started in emergency department and patient admitted to the ICU.    Plan:  Continuous cardiac monitoring  Nicardipine drip Dc'd  Can return patient's blood pressure to baseline as there is no acute intracranial pathologies and it has been  greater than 24 hours since initial hypertension  Monitor morning labs  Restarted home antihypertensives  Anemia in ESRD (end-stage renal disease)  ([facility])  Lab Results   Component Value Date    EGFR 16 11/06/2024    EGFR 5 11/06/2024    EGFR 6 11/05/2024    CREATININE 4.23 (H) 11/06/2024    CREATININE 9.75 (H) 11/06/2024    CREATININE 8.71 (H) 11/05/2024   Continue to monitor CBCs  Hemoglobin has been stable  Patient does not take any erythropoietin stimulating medications at home  Chronic kidney disease-mineral and bone disorder  Lab Results   Component Value Date    EGFR 16 11/06/2024    EGFR 5 11/06/2024    EGFR 6 11/05/2024    CREATININE 4.23 (H) 11/06/2024    CREATININE 9.75 (H) 11/06/2024    CREATININE 8.71 (H) 11/05/2024     Disposition: Med Surg    ICU Core Measures     A: Assess, Prevent, and Manage Pain Has pain been assessed? Yes  Need for changes to pain regimen? No   B: Both SAT/SAT  N/A   C: Choice of Sedation RASS Goal: 0 Alert and Calm or N/A patient not on sedation  Need for changes to sedation or analgesia regimen? No   D: Delirium CAM-ICU: Negative   E: Early Mobility  Plan for early mobility? Yes   F: Family Engagement Plan for family engagement today? Yes         Prophylaxis:  VTE VTE covered by:  heparin (porcine), Subcutaneous, 5,000 Units at 11/07/24 0533       Stress Ulcer  covered byfamotidine (PEPCID) 40 MG tablet [[id number]] (Long-Term Med), famotidine (PEPCID) tablet 10 mg [[id number]]         24 Hour Events : Patient received hemodialysis yesterday afternoon and began to appear restless, diaphoretic, and uncomfortable.  Dialysis nurse turned off fluid removal but patient continued to have symptoms.  Multiple neuroexams conducted and patient was noticed to have horizontal nystagmus as well as upper extremity clonus.  Patient was also noted to have trouble finding words and became increasingly confused.  Dialysis was ended approximately 30 minutes early.  Broad workup began and within 30  minutes of dialysis ending patient returned back to his baseline with no complaints.  Neurology updated on patient's symptoms and EEG was recommended.  Since this episode, patient has had no complaints and has been off of the Cardene drip since 11 PM.  Subjective   Review of Systems: Review of Systems   Constitutional:  Negative for chills and fever.   HENT:  Negative for ear pain and sore throat.    Eyes:  Negative for pain and visual disturbance.   Respiratory:  Negative for cough and shortness of breath.    Cardiovascular:  Negative for chest pain and palpitations.   Gastrointestinal:  Negative for abdominal pain and vomiting.   Genitourinary:  Negative for dysuria and hematuria.   Musculoskeletal:  Negative for arthralgias and back pain.   Skin:  Negative for color change and rash.   Neurological:  Positive for tremors and weakness. Negative for dizziness, seizures, syncope, light-headedness and headaches.   All other systems reviewed and are negative.      Objective :                   Vitals I/O      Most Recent Min/Max in 24hrs   Temp 98.2 °F (36.8 °C) Temp  Min: 98 °F (36.7 °C)  Max: 98.5 °F (36.9 °C)   Pulse 73 Pulse  Min: 68  Max: 109   Resp 12 Resp  Min: 12  Max: 25   /78 BP  Min: 138/75  Max: 191/111   O2 Sat 96 % SpO2  Min: 96 %  Max: 100 %      Intake/Output Summary (Last 24 hours) at 11/7/2024 0617  Last data filed at 11/6/2024 2315  Gross per 24 hour   Intake 1404.58 ml   Output 2733 ml   Net -1328.42 ml       Diet Charanjit/CHO Controlled; Consistent Carbohydrate Diet Level 3 (6 carb servings/90 grams CHO/meal); Sodium 2 GM    Invasive Monitoring           Physical Exam   Physical Exam  Eyes:      General: No scleral icterus.        Right eye: No discharge.         Left eye: No discharge.      Conjunctiva/sclera: Conjunctivae normal.   Skin:     General: Skin is warm and dry.      Coloration: Skin is not jaundiced.      Findings: No wound.   HENT:      Head: Normocephalic and atraumatic.      Nose:  No nasal deformity or nasogastric tube.      Mouth/Throat:      Mouth: Mucous membranes are moist.   Cardiovascular:      Rate and Rhythm: Normal rate and regular rhythm.      Pulses: No decreased pulses.      Heart sounds: Normal heart sounds.   Musculoskeletal:         General: No swelling, tenderness or deformity. Normal range of motion.      Right lower leg: No edema.      Left lower leg: No edema.   Abdominal: General: Bowel sounds are normal. There is no distension.      Palpations: Abdomen is soft.      Tenderness: There is no abdominal tenderness. There is no guarding.   Constitutional:       General: He is not in acute distress.     Appearance: He is well-developed. He is not ill-appearing or toxic-appearing.      Interventions: He is not sedated and not intubated.  Pulmonary:      Effort: Pulmonary effort is normal. He is not intubated.      Breath sounds: Normal breath sounds.   Psychiatric:         Mood and Affect: Mood and affect normal.   Neurological:      Mental Status: He is alert and oriented to person, place and time. He is calm.      Cranial Nerves: No facial asymmetry.      Sensory: Sensation is intact.      Motor: No pronator drift or motor deficit.        Clonus present.      Comments: Cranial nerves II through XII intact.  Sensation equal bilaterally.  Alert and oriented x 3.  Finger-to-nose and rapid alternating movements intact.  Mild clonus noted in right upper extremity.  5 out of 5 strength in both lower and upper extremities.  No pronator drift          Diagnostic Studies        Lab Results: I have reviewed the following results:     Medications:  Scheduled PRN   aspirin, 81 mg, Daily  atorvastatin, 40 mg, HS  chlorhexidine, 15 mL, Q12H HALIE  clopidogrel, 75 mg, Daily  escitalopram, 20 mg, Daily  famotidine, 10 mg, Daily  heparin (porcine), 5,000 Units, Q8H HALIE  labetalol, 400 mg, Q8H HALIE  losartan, 100 mg, Daily  NIFEdipine, 90 mg, Daily      ondansetron, 4 mg, Q4H PRN       Continuous     niCARdipine, 1-15 mg/hr, Last Rate: Stopped (11/06/24 2315)         Labs:   CBC    Recent Labs     11/06/24  1214 11/07/24  0538   WBC 6.45 4.18*   HGB 13.0 12.2   HCT 37.8 37.2    197     BMP    Recent Labs     11/06/24  0452 11/06/24  1214   SODIUM 139 137   K 3.8 4.3   CL 97 96   CO2 31 28   AGAP 11 13   BUN 27* 10   CREATININE 9.75* 4.23*   CALCIUM 8.4 9.0       Coags    Recent Labs     11/05/24  1810 11/06/24  0452   INR 1.01 0.99   PTT 32  --         Additional Electrolytes  Recent Labs     11/06/24  0452 11/06/24  1214   MG 2.4 2.0   PHOS 4.0 1.4*   CAIONIZED 1.02* 1.00*          Blood Gas    No recent results  Recent Labs     11/06/24  1334   PHVEN 7.436*   MQX8OJV 42.4   PO2VEN 34.0*   MHG3WPL 27.9   BEVEN 3.3   V3WFINT 65.7    LFTs  Recent Labs     11/06/24  0452 11/06/24  1214   ALT 11 14   AST 15 23   ALKPHOS 98 117*   ALB 3.6 4.3   TBILI 0.55 0.81       Infectious  No recent results  Glucose  Recent Labs     11/05/24  1606 11/06/24  0452 11/06/24  1214   GLUC 176* 145* 138

## 2024-11-07 NOTE — ASSESSMENT & PLAN NOTE
Lab Results   Component Value Date    EGFR 16 11/06/2024    EGFR 5 11/06/2024    EGFR 6 11/05/2024    CREATININE 4.23 (H) 11/06/2024    CREATININE 9.75 (H) 11/06/2024    CREATININE 8.71 (H) 11/05/2024

## 2024-11-07 NOTE — ASSESSMENT & PLAN NOTE
Lab Results   Component Value Date    HGBA1C 6.9 (H) 11/05/2024       Recent Labs     11/06/24  1140 11/06/24  1750 11/07/24  0031 11/07/24  0157   POCGLU 120 186* 224* 179*       Blood Sugar Average: Last 72 hrs:  (P) [id number]    Pt has glucose monitor and insulin pump in place, pt refuses removal unless necessary for MRI.    Plan:  Maintain euglycemia

## 2024-11-07 NOTE — PROGRESS NOTES
NEPHROLOGY HOSPITAL PROGRESS NOTE   Mateus Mckeon 39 y.o. male MRN: [id number]  Unit/Bed#: ICU 02 Encounter: [id number]  Reason for Consult: ESRD on HD    Brief History of Admission - 38 yo with HTN, DM, HLP, ESRD on HD, CVA presenting to [facility] on November 5, 2024 with dizziness and was found to have accelerated hypertension on presentation.  Renal consulted for ESRD on HD.  Assessment & Plan  ESRD on hemodialysis ([facility])  [facility].  Access: Left arm AVF.  EDW: 90 kg.  Stable electrolytes.  Weight down to 88.7 kg after dialysis yesterday.  Next HD is tomorrow.    Hypertensive emergency  Noted to have accelerated hypertension on presentation.  Home medications per MAR: Labetalol 400 mg TID, Nifedipine 90 mg OD, Olmesartan 40 mg OD - confirmed with mother.   S/p Cardene drip.   BP better overall - back on usual home meds   Lower EDW to 88.5 kg.     Anemia in ESRD (end-stage renal disease)  ([facility])  Hgb at goal. 12.2 today  Not on HETAL at this time.     Chronic kidney disease-mineral and bone disorder  In the outpatient setting, he is on Sensipar 120 mg daily and calcitriol 0.75 mcg with HD for 2HPT.  He is on Velphoro for hyperphosphatemia.  Restart Sensipar and Calcitriol.   Phos is at goal.     Type 1 diabetes mellitus on insulin therapy ([facility])  Defer to primary team.    PLAN SUMMARY:  Next HD is tomorrow.   Continue current BP medications.   May consider adding Hydralazine if BP remains consistently high.   Restart Sensipar 120 mg OD and Calcitriol 0.75 mcg with HD.     I have reviewed the nephrology recommendations including continuing current BP meds with ICU team and we are in agreement with renal plan including the information outlined above.    SUBJECTIVE / 24H INTERVAL HISTORY:  Doing better.   No longer diaphoretic.   No CP or SOB.   Dizziness is present but better overall.   Off Cardene drip now.   He is back on his usual home medications.   Mom at bedside.     OBJECTIVE:  Current  Weight: Weight - Scale: 94.8 kg (208 lb 15.9 oz)  Vitals:    11/07/24 0725 11/07/24 0814 11/07/24 0928 11/07/24 1109   BP: 136/77 165/81 157/87 152/86   BP Location: Right arm   Right arm   Pulse: 67 76 75    Resp: 16 18     Temp: 98.2 °F (36.8 °C)   98.4 °F (36.9 °C)   TempSrc: Oral   Oral   SpO2: 97%  99%    Weight:       Height:           Intake/Output Summary (Last 24 hours) at 11/7/2024 1153  Last data filed at 11/7/2024 0800  Gross per 24 hour   Intake 1362.91 ml   Output 2733 ml   Net -1370.09 ml     General: conscious, cooperative, no distress  Skin: dry  Eyes: pink conjunctivae  ENT: moist mucous membranes  Respiratory: equal chest expansion, clear breath sounds.  Cardiovascular: distinct heart sounds, normal rate, regular rhythm, no rub  Abdomen: soft, non tender, non distended, normal bowel sounds  Extremities: no edema.   Genitourinary: no preston catheter.   Neuro: awake, alert.   Psych: appropriate affect    Medications:    Current Facility-Administered Medications:     aspirin chewable tablet 81 mg, 81 mg, Oral, Daily, Aime Gee MD, 81 mg at 11/07/24 0810    atorvastatin (LIPITOR) tablet 40 mg, 40 mg, Oral, HS, Aime Gee MD, 40 mg at 11/06/24 2146    chlorhexidine (PERIDEX) 0.12 % oral rinse 15 mL, 15 mL, Mouth/Throat, Q12H HALIE, Aime Gee MD, 15 mL at 11/07/24 0811    clopidogrel (PLAVIX) tablet 75 mg, 75 mg, Oral, Daily, Aime Gee MD, 75 mg at 11/07/24 0810    escitalopram (LEXAPRO) tablet 20 mg, 20 mg, Oral, Daily, Chaka Olson DO, 20 mg at 11/07/24 0810    famotidine (PEPCID) tablet 10 mg, 10 mg, Oral, Daily, Don Gallo MD, 10 mg at 11/07/24 0810    [START ON 11/8/2024] gabapentin (NEURONTIN) capsule 100 mg, 100 mg, Oral, Daily **AND** gabapentin (NEURONTIN) capsule 200 mg, 200 mg, Oral, HS, Don Gallo MD    heparin (porcine) subcutaneous injection 5,000 Units, 5,000 Units, Subcutaneous, Q8H HALIE, 5,000 Units at 11/07/24 0533 **AND** [COMPLETED] Platelet count, , , Once,  "Aime Gee MD    labetalol (NORMODYNE) tablet 400 mg, 400 mg, Oral, Q8H HALIE, Chaka Olson, DO, 400 mg at 11/07/24 0533    losartan (COZAAR) tablet 100 mg, 100 mg, Oral, Daily, Chaka Olson, DO, 100 mg at 11/07/24 0810    niCARdipine (CARDENE) 25 mg (STANDARD CONCENTRATION) in sodium chloride 0.9% 250 mL, 1-15 mg/hr, Intravenous, Titrated, Aime Gee MD, Held at 11/06/24 2315    NIFEdipine (PROCARDIA XL) 24 hr tablet 90 mg, 90 mg, Oral, Daily, hCaka Olson DO, 90 mg at 11/07/24 0811    ondansetron (ZOFRAN) injection 4 mg, 4 mg, Intravenous, Q4H PRN, Margot Cartagena MD, 4 mg at 11/07/24 0600    Laboratory Results:  Results from last 7 days   Lab Units 11/07/24  0538 11/06/24  1214 11/06/24  0452 11/05/24  2034 11/05/24  1606   WBC Thousand/uL 4.18* 6.45 5.16  --  5.71   HEMOGLOBIN g/dL 12.2 13.0 11.0*  --  12.0   HEMATOCRIT % 37.2 37.8 32.9*  --  35.9*   PLATELETS Thousands/uL 197 228 179 185 206   POTASSIUM mmol/L 4.1 4.3 3.8  --  4.5   CHLORIDE mmol/L 97 96 97  --  100   CO2 mmol/L 31 28 31  --  35*   BUN mg/dL 21 10 27*  --  25   CREATININE mg/dL 7.91* 4.23* 9.75*  --  8.71*   CALCIUM mg/dL 9.1 9.0 8.4  --  8.1*   MAGNESIUM mg/dL 2.3 2.0 2.4 2.5  --    PHOSPHORUS mg/dL  --  1.4* 4.0 3.6  --      Portions of the record may have been created with voice recognition software. Occasional wrong word or \"sound a like\" substitutions may have occurred due to the inherent limitations of voice recognition software. Read the chart carefully and recognize, using context, where substitutions have occurred.If you have any questions, please contact the dictating provider.    "

## 2024-11-07 NOTE — PROGRESS NOTES
Progress Note - Critical Care/ICU   Name: Mateus Mckeon 39 y.o. male I MRN: [id number]  Unit/Bed#: ICU 02 I Date of Admission: 11/5/2024   Date of Service: 11/7/2024 I Hospital Day: 2       Critical Care Interval Transfer Note:    Brief Hospital Summary:  39- year-old male with multiple past CVAs, SAH, DM 1, ESRD MTHFR gene, and hypertension presented to the emergency department 2 days ago with dizziness and confusion that began when he woke up at approximately 8 in the morning.  Patient presented to ED in the afternoon after symptoms were not getting better and he believes he felt similar to previous stroke.  Patient was worked up under the stroke pathway and CT/MRI were negative for any acute strokes. Patient did have significantly elevated blood pressure in the ER with systolics between 210 and 230. Patient did not respond to labetalol or hydralazine so was started on a Cardene drip and sent to the ICU.  Patient's blood pressure responded well to Cardene drip with a goal between 180 and 160 systolic. Yesterday, patient was getting hemodialysis when he began to feel uncomfortable, had trouble finding words, and diaphoretic. His blood pressure was in the 160s. We are suspecting it might be seizure related so he got an EEG today and has been followed by neuro. Neuro states they will likely recommend AEDs for patient as EEG is suggestive of seizures.  Patient's blood pressure has been stable since yesterday and he has been off Cardene drip since 11 PM on 11/6.  Patient due for hemodialysis on Friday.    Barriers to discharge:   None     Consults: IP CONSULT TO NEUROLOGY  IP CONSULT TO CASE MANAGEMENT  IP CONSULT TO NEPHROLOGY    Recommended to review admission imaging for incidental findings and document in discharge navigator: Chart reviewed, no known incidental findings noted at this time.      Discharge Plan: Anticipate discharge in 24-48 hrs to home.       Patient seen and evaluated by Critical Care today  and deemed to be appropriate for transfer to Med Surg. Spoke to Dr. Conde from [facility] to accept transfer. Critical care can be contacted via SecureChat with any questions or concerns.

## 2024-11-07 NOTE — OCCUPATIONAL THERAPY NOTE
Occupational Therapy Screen    Patient Name: Mateus Mckeon  Today's Date: 11/7/2024 11/07/24 1508   OT Last Visit   OT Visit Date 11/07/24   Note Type   Note type Screen   Additional Comments OT orders received and chart review performed. Pt admitted w/ hypertensive emergency. Upon arrival to pt room, pt walking w/ mother in hallway w/ SPC. Pt reports he has been attending OPOT for hand therapy since March and has made great progress. Pt reports he has been (I) w/ ADLs and denies concerns about DC home at this time. Pt provided w/ stress ball and tubigrip for utensils this admission and encouraged to continue OPOT regimen as best to his ability. Will screen from caseload. Recommend continued f/u w/ OPOT.     SVEN Griffith, OTR/L  [place] License [id number]  [place] License [id number]

## 2024-11-07 NOTE — TELEPHONE ENCOUNTER
STILL ADMITTED :11/5/2024 - present (2 days)  Formerly [facility]    HFU/ SL DYANA / Word finding difficulty     ----- Message from DEISI Fernandez sent at 11/7/2024  3:25 PM EST -----  Regarding: Mateus Bensonkyung will need follow-up in in 6 weeks with epilepsy team for Other = ATTENDING in 60 minute appointment. They will not require outpatient neurological testing. Patient has seen Dr. Graves in the past.    Thank you,   Bonny LIPSCOMB

## 2024-11-07 NOTE — PLAN OF CARE
Problem: PHYSICAL THERAPY ADULT  Goal: Performs mobility at highest level of function for planned discharge setting.  See evaluation for individualized goals.  Description: Treatment/Interventions: Functional transfer training, LE strengthening/ROM, Elevations, Therapeutic exercise, Endurance training, Gait training, Equipment eval/education, Patient/family training, Compensatory technique education          See flowsheet documentation for full assessment, interventions and recommendations.  Note:    Problem List: Decreased strength, Decreased endurance, Impaired balance, Decreased mobility, Decreased safety awareness, Impaired sensation, Impaired tone  Assessment: Pt presents due to dizziness. Pt has noticed that his blood pressure was abnormally high. Dx: stroke like symptoms, ESRD, DM, HTN, and anemia. order placed for PT eval and tx, w/ activity order of fall precautions and out of bed to chair. pt presents w/ comorbidities of DM, CVA, ESRD, hyperlipidemia, protein calorie malnutrition, and HTN and personal factors of . pt presents w/ weakness, decreased endurance, impaired balance, gait deviations, altered sensation, impaired tone, decreased safety awareness, and fall risk. these impairments are evident in findings from physical examination (weakness, altered sensation, and impaired tone), mobility assessment (need for standby assist w/ all phases of mobility when usually mobilizing independently, tolerance to only 80 feet of ambulation, and need for cueing for mobility technique), and Barthel Index: 80/100 and Comfortable Gait Speed: 0.50 m/s (less than 1.0 m/s indicates need for intervention to address falls risk). pt needed input for mobility technique. pt is at risk for falls due to physical and safety awareness deficits. pt's clinical presentation is unstable/unpredictable (evident in poor blood sugar control, need for standby assist w/ all phases of mobility when usually mobilizing independently,  tolerance to only 80 feet of ambulation, lightheadedness impacting overall mobility status, and need for input for mobility technique/safety). pt needs inpatient PT tx to improve mobility deficits and progress mobility training as appropriate        Rehab Resource Intensity Level, PT: III (Minimum Resource Intensity)    See flowsheet documentation for full assessment.

## 2024-11-07 NOTE — PLAN OF CARE
Problem: METABOLIC, FLUID AND ELECTROLYTES - ADULT  Goal: Electrolytes maintained within normal limits  Description: INTERVENTIONS:  - Monitor labs and assess patient for signs and symptoms of electrolyte imbalances  - Administer electrolyte replacement as ordered  - Monitor response to electrolyte replacements, including repeat lab results as appropriate  - Instruct patient on fluid and nutrition as appropriate  11/7/2024 0912 by Gertrude Keller RN  Outcome: Progressing  11/7/2024 0911 by Gertrude Keller RN  Outcome: Progressing  Goal: Fluid balance maintained  Description: INTERVENTIONS:  - Monitor labs   - Monitor I/O and WT  - Instruct patient on fluid and nutrition as appropriate  - Assess for signs & symptoms of volume excess or deficit  11/7/2024 0912 by Gertrude Keller RN  Outcome: Progressing  11/7/2024 0911 by Gertrude Keller RN  Outcome: Progressing     Problem: Nutrition/Hydration-ADULT  Goal: Nutrient/Hydration intake appropriate for improving, restoring or maintaining nutritional needs  Description: Monitor and assess patient's nutrition/hydration status for malnutrition. Collaborate with interdisciplinary team and initiate plan and interventions as ordered.  Monitor patient's weight and dietary intake as ordered or per policy. Utilize nutrition screening tool and intervene as necessary. Determine patient's food preferences and provide high-protein, high-caloric foods as appropriate.     INTERVENTIONS:  - Monitor oral intake, urinary output, labs, and treatment plans  - Assess nutrition and hydration status and recommend course of action  - Evaluate amount of meals eaten  - Assist patient with eating if necessary   - Allow adequate time for meals  - Recommend/ encourage appropriate diets, oral nutritional supplements, and vitamin/mineral supplements  - Order, calculate, and assess calorie counts as needed  - Recommend, monitor, and adjust tube feedings and TPN/PPN based on assessed needs  - Assess need for  intravenous fluids  - Provide specific nutrition/hydration education as appropriate  - Include patient/family/caregiver in decisions related to nutrition  11/7/2024 0912 by Gertrude Keller RN  Outcome: Progressing  11/7/2024 0911 by Gertrude Keller RN  Outcome: Progressing     Problem: PAIN - ADULT  Goal: Verbalizes/displays adequate comfort level or baseline comfort level  Description: Interventions:  - Encourage patient to monitor pain and request assistance  - Assess pain using appropriate pain scale  - Administer analgesics based on type and severity of pain and evaluate response  - Implement non-pharmacological measures as appropriate and evaluate response  - Consider cultural and social influences on pain and pain management  - Notify physician/advanced practitioner if interventions unsuccessful or patient reports new pain  Outcome: Progressing     Problem: INFECTION - ADULT  Goal: Absence or prevention of progression during hospitalization  Description: INTERVENTIONS:  - Assess and monitor for signs and symptoms of infection  - Monitor lab/diagnostic results  - Monitor all insertion sites, i.e. indwelling lines, tubes, and drains  - Monitor endotracheal if appropriate and nasal secretions for changes in amount and color  - [place] appropriate cooling/warming therapies per order  - Administer medications as ordered  - Instruct and encourage patient and family to use good hand hygiene technique  - Identify and instruct in appropriate isolation precautions for identified infection/condition  Outcome: Progressing  Goal: Absence of fever/infection during neutropenic period  Description: INTERVENTIONS:  - Monitor WBC    Outcome: Progressing     Problem: SAFETY ADULT  Goal: Patient will remain free of falls  Description: INTERVENTIONS:  - Educate patient/family on patient safety including physical limitations  - Instruct patient to call for assistance with activity   - Consult OT/PT to assist with strengthening/mobility    - Keep Call bell within reach  - Keep bed low and locked with side rails adjusted as appropriate  - Keep care items and personal belongings within reach  - Initiate and maintain comfort rounds  - Make Fall Risk Sign visible to staff  - Apply yellow socks and bracelet for high fall risk patients  - Consider moving patient to room near nurses station  Outcome: Progressing  Goal: Maintain or return to baseline ADL function  Description: INTERVENTIONS:  -  Assess patient's ability to carry out ADLs; assess patient's baseline for ADL function and identify physical deficits which impact ability to perform ADLs (bathing, care of mouth/teeth, toileting, grooming, dressing, etc.)  - Assess/evaluate cause of self-care deficits   - Assess range of motion  - Assess patient's mobility; develop plan if impaired  - Assess patient's need for assistive devices and provide as appropriate  - Encourage maximum independence but intervene and supervise when necessary  - Involve family in performance of ADLs  - Assess for home care needs following discharge   - Consider OT consult to assist with ADL evaluation and planning for discharge  - Provide patient education as appropriate  Outcome: Progressing  Goal: Maintains/Returns to pre admission functional level  Description: INTERVENTIONS:  - Perform AM-PAC 6 Click Basic Mobility/ Daily Activity assessment daily.  - Set and communicate daily mobility goal to care team and patient/family/caregiver.   - Collaborate with rehabilitation services on mobility goals if consulted  - Out of bed for toileting  - Record patient progress and toleration of activity level   Outcome: Progressing     Problem: DISCHARGE PLANNING  Goal: Discharge to home or other facility with appropriate resources  Description: INTERVENTIONS:  - Identify barriers to discharge w/patient and caregiver  - Arrange for needed discharge resources and transportation as appropriate  - Identify discharge learning needs (meds,  wound care, etc.)  - Arrange for interpretive services to assist at discharge as needed  - Refer to Case Management Department for coordinating discharge planning if the patient needs post-hospital services based on physician/advanced practitioner order or complex needs related to functional status, cognitive ability, or social support system  Outcome: Progressing     Problem: Knowledge Deficit  Goal: Patient/family/caregiver demonstrates understanding of disease process, treatment plan, medications, and discharge instructions  Description: Complete learning assessment and assess knowledge base.  Interventions:  - Provide teaching at level of understanding  - Provide teaching via preferred learning methods  Outcome: Progressing

## 2024-11-07 NOTE — ASSESSMENT & PLAN NOTE
Pt presented to ED with dizziness which is similar to previous CVA symptoms, stroke alert called, imaging largely unremarkable.  Admission on stroke pathway for follow-up MRI.    Plan:  Q1h neuro checks  MRI showed no acute intracranial process  A1c, Lipid panel, TSH  81 mg ASA daily  75 mg Plavix daily  Follow-up echo  Continuous tele  PT/OT evaluation  STAT head CT for any neurologic changes  EEG to evaluate for seizure-like activity

## 2024-11-08 ENCOUNTER — APPOINTMENT (INPATIENT)
Dept: DIALYSIS | Facility: HOSPITAL | Age: 39
DRG: 304 | End: 2024-11-08
Attending: INTERNAL MEDICINE
Payer: MEDICARE

## 2024-11-08 ENCOUNTER — APPOINTMENT (OUTPATIENT)
Facility: CLINIC | Age: 39
End: 2024-11-08
Payer: MEDICARE

## 2024-11-08 LAB
ANION GAP SERPL CALCULATED.3IONS-SCNC: 11 MMOL/L (ref 4–13)
BASOPHILS # BLD AUTO: 0.04 THOUSANDS/ÂΜL (ref 0–0.1)
BASOPHILS NFR BLD AUTO: 1 % (ref 0–1)
BUN SERPL-MCNC: 31 MG/DL (ref 5–25)
CALCIUM SERPL-MCNC: 8.7 MG/DL (ref 8.4–10.2)
CHLORIDE SERPL-SCNC: 96 MMOL/L (ref 96–108)
CO2 SERPL-SCNC: 29 MMOL/L (ref 21–32)
CREAT SERPL-MCNC: 10.54 MG/DL (ref 0.6–1.3)
EOSINOPHIL # BLD AUTO: 0.3 THOUSAND/ÂΜL (ref 0–0.61)
EOSINOPHIL NFR BLD AUTO: 7 % (ref 0–6)
ERYTHROCYTE [DISTWIDTH] IN BLOOD BY AUTOMATED COUNT: 13.2 % (ref 11.6–15.1)
GFR SERPL CREATININE-BSD FRML MDRD: 5 ML/MIN/1.73SQ M
GLUCOSE SERPL-MCNC: 132 MG/DL (ref 65–140)
GLUCOSE SERPL-MCNC: 134 MG/DL (ref 65–140)
GLUCOSE SERPL-MCNC: 155 MG/DL (ref 65–140)
GLUCOSE SERPL-MCNC: 194 MG/DL (ref 65–140)
HCT VFR BLD AUTO: 31.5 % (ref 36.5–49.3)
HGB BLD-MCNC: 10.6 G/DL (ref 12–17)
IMM GRANULOCYTES # BLD AUTO: 0.02 THOUSAND/UL (ref 0–0.2)
IMM GRANULOCYTES NFR BLD AUTO: 1 % (ref 0–2)
LYMPHOCYTES # BLD AUTO: 1.19 THOUSANDS/ÂΜL (ref 0.6–4.47)
LYMPHOCYTES NFR BLD AUTO: 29 % (ref 14–44)
MAGNESIUM SERPL-MCNC: 2.5 MG/DL (ref 1.9–2.7)
MCH RBC QN AUTO: 32.4 PG (ref 26.8–34.3)
MCHC RBC AUTO-ENTMCNC: 33.7 G/DL (ref 31.4–37.4)
MCV RBC AUTO: 96 FL (ref 82–98)
MONOCYTES # BLD AUTO: 0.45 THOUSAND/ÂΜL (ref 0.17–1.22)
MONOCYTES NFR BLD AUTO: 11 % (ref 4–12)
NEUTROPHILS # BLD AUTO: 2.11 THOUSANDS/ÂΜL (ref 1.85–7.62)
NEUTS SEG NFR BLD AUTO: 51 % (ref 43–75)
NRBC BLD AUTO-RTO: 0 /100 WBCS
PHOSPHATE SERPL-MCNC: 4.8 MG/DL (ref 2.7–4.5)
PLATELET # BLD AUTO: 181 THOUSANDS/UL (ref 149–390)
PMV BLD AUTO: 10.9 FL (ref 8.9–12.7)
POTASSIUM SERPL-SCNC: 4.4 MMOL/L (ref 3.5–5.3)
RBC # BLD AUTO: 3.27 MILLION/UL (ref 3.88–5.62)
SODIUM SERPL-SCNC: 136 MMOL/L (ref 135–147)
WBC # BLD AUTO: 4.11 THOUSAND/UL (ref 4.31–10.16)

## 2024-11-08 PROCEDURE — 82948 REAGENT STRIP/BLOOD GLUCOSE: CPT

## 2024-11-08 PROCEDURE — 99233 SBSQ HOSP IP/OBS HIGH 50: CPT | Performed by: HOSPITALIST

## 2024-11-08 PROCEDURE — 85025 COMPLETE CBC W/AUTO DIFF WBC: CPT

## 2024-11-08 PROCEDURE — 83735 ASSAY OF MAGNESIUM: CPT

## 2024-11-08 PROCEDURE — 84100 ASSAY OF PHOSPHORUS: CPT

## 2024-11-08 PROCEDURE — 90935 HEMODIALYSIS ONE EVALUATION: CPT | Performed by: INTERNAL MEDICINE

## 2024-11-08 PROCEDURE — 80048 BASIC METABOLIC PNL TOTAL CA: CPT

## 2024-11-08 RX ORDER — HYDRALAZINE HYDROCHLORIDE 20 MG/ML
10 INJECTION INTRAMUSCULAR; INTRAVENOUS EVERY 6 HOURS PRN
Status: DISCONTINUED | OUTPATIENT
Start: 2024-11-08 | End: 2024-11-10 | Stop reason: HOSPADM

## 2024-11-08 RX ADMIN — CINACALCET 120 MG: 30 TABLET ORAL at 08:09

## 2024-11-08 RX ADMIN — HEPARIN SODIUM 5000 UNITS: 5000 INJECTION INTRAVENOUS; SUBCUTANEOUS at 06:49

## 2024-11-08 RX ADMIN — CALCITRIOL 0.75 MCG: 0.25 CAPSULE, LIQUID FILLED ORAL at 08:09

## 2024-11-08 RX ADMIN — NIFEDIPINE 90 MG: 30 TABLET, FILM COATED, EXTENDED RELEASE ORAL at 08:10

## 2024-11-08 RX ADMIN — LABETALOL HYDROCHLORIDE 400 MG: 200 TABLET, FILM COATED ORAL at 20:39

## 2024-11-08 RX ADMIN — LABETALOL HYDROCHLORIDE 400 MG: 200 TABLET, FILM COATED ORAL at 06:49

## 2024-11-08 RX ADMIN — ATORVASTATIN CALCIUM 40 MG: 40 TABLET, FILM COATED ORAL at 20:40

## 2024-11-08 RX ADMIN — HYDRALAZINE HYDROCHLORIDE 10 MG: 20 INJECTION, SOLUTION INTRAMUSCULAR; INTRAVENOUS at 09:52

## 2024-11-08 RX ADMIN — ASPIRIN 81 MG 81 MG: 81 TABLET ORAL at 08:09

## 2024-11-08 RX ADMIN — ESCITALOPRAM OXALATE 20 MG: 20 TABLET ORAL at 08:10

## 2024-11-08 RX ADMIN — LABETALOL HYDROCHLORIDE 400 MG: 200 TABLET, FILM COATED ORAL at 13:16

## 2024-11-08 RX ADMIN — GABAPENTIN 100 MG: 100 CAPSULE ORAL at 08:10

## 2024-11-08 RX ADMIN — GABAPENTIN 200 MG: 100 CAPSULE ORAL at 20:40

## 2024-11-08 RX ADMIN — HEPARIN SODIUM 5000 UNITS: 5000 INJECTION INTRAVENOUS; SUBCUTANEOUS at 20:40

## 2024-11-08 RX ADMIN — CLOPIDOGREL BISULFATE 75 MG: 75 TABLET ORAL at 08:10

## 2024-11-08 RX ADMIN — HEPARIN SODIUM 5000 UNITS: 5000 INJECTION INTRAVENOUS; SUBCUTANEOUS at 13:16

## 2024-11-08 RX ADMIN — LOSARTAN POTASSIUM 100 MG: 50 TABLET, FILM COATED ORAL at 08:09

## 2024-11-08 NOTE — ASSESSMENT & PLAN NOTE
Lab Results   Component Value Date    EGFR 5 11/08/2024    EGFR 7 11/07/2024    EGFR 16 11/06/2024    CREATININE 10.54 (H) 11/08/2024    CREATININE 7.91 (H) 11/07/2024    CREATININE 4.23 (H) 11/06/2024     Patient with Hemodialysis schedule MWF    PLAN  -Continue with regular schedule

## 2024-11-08 NOTE — ASSESSMENT & PLAN NOTE
Noted to have accelerated hypertension on presentation.  Home medications per MAR: Labetalol 400 mg TID, Nifedipine 90 mg OD, Olmesartan 40 mg OD - confirmed with mother.   S/p Cardene drip.   BP better yesterday but high on HD today.   EDW lowered to 89.5 kg.   Running even on HD today.   See below regarding BP meds plan.

## 2024-11-08 NOTE — ASSESSMENT & PLAN NOTE
Lab Results   Component Value Date    HGBA1C 6.9 (H) 11/05/2024       Recent Labs     11/07/24  1814 11/08/24  0022 11/08/24  0744 11/08/24  1200   POCGLU 180* 134 155* 132       Blood Sugar Average: Last 72 hrs:  (P) [id number]    Pt has glucose monitor and insulin pump in place   pt refuses removal unless necessary for MRI.     Plan:  Maintain euglycemia

## 2024-11-08 NOTE — ASSESSMENT & PLAN NOTE
Lab Results   Component Value Date    EGFR 5 11/08/2024    EGFR 7 11/07/2024    EGFR 16 11/06/2024    CREATININE 10.54 (H) 11/08/2024    CREATININE 7.91 (H) 11/07/2024    CREATININE 4.23 (H) 11/06/2024     Continue to monitor  Not on EPO at home

## 2024-11-08 NOTE — ASSESSMENT & PLAN NOTE
Continue Sensipar 120 mg daily and calcitriol 0.75 mcg with HD for 2HPT.  He is on Velphoro for hyperphosphatemia.  Phos is at goal.   Ca at goal.

## 2024-11-08 NOTE — PLAN OF CARE
Problem: METABOLIC, FLUID AND ELECTROLYTES - ADULT  Goal: Electrolytes maintained within normal limits  Description: INTERVENTIONS:  - Monitor labs and assess patient for signs and symptoms of electrolyte imbalances  - Administer electrolyte replacement as ordered  - Monitor response to electrolyte replacements, including repeat lab results as appropriate  - Instruct patient on fluid and nutrition as appropriate  Outcome: Progressing  Goal: Fluid balance maintained  Description: INTERVENTIONS:  - Monitor labs   - Monitor I/O and WT  - Instruct patient on fluid and nutrition as appropriate  - Assess for signs & symptoms of volume excess or deficit  Outcome: Progressing     Problem: Nutrition/Hydration-ADULT  Goal: Nutrient/Hydration intake appropriate for improving, restoring or maintaining nutritional needs  Description: Monitor and assess patient's nutrition/hydration status for malnutrition. Collaborate with interdisciplinary team and initiate plan and interventions as ordered.  Monitor patient's weight and dietary intake as ordered or per policy. Utilize nutrition screening tool and intervene as necessary. Determine patient's food preferences and provide high-protein, high-caloric foods as appropriate.     INTERVENTIONS:  - Monitor oral intake, urinary output, labs, and treatment plans  - Assess nutrition and hydration status and recommend course of action  - Evaluate amount of meals eaten  - Assist patient with eating if necessary   - Allow adequate time for meals  - Recommend/ encourage appropriate diets, oral nutritional supplements, and vitamin/mineral supplements  - Order, calculate, and assess calorie counts as needed  - Recommend, monitor, and adjust tube feedings and TPN/PPN based on assessed needs  - Assess need for intravenous fluids  - Provide specific nutrition/hydration education as appropriate  - Include patient/family/caregiver in decisions related to nutrition  Outcome: Progressing     Problem:  PAIN - ADULT  Goal: Verbalizes/displays adequate comfort level or baseline comfort level  Description: Interventions:  - Encourage patient to monitor pain and request assistance  - Assess pain using appropriate pain scale  - Administer analgesics based on type and severity of pain and evaluate response  - Implement non-pharmacological measures as appropriate and evaluate response  - Consider cultural and social influences on pain and pain management  - Notify physician/advanced practitioner if interventions unsuccessful or patient reports new pain  Outcome: Progressing     Problem: INFECTION - ADULT  Goal: Absence or prevention of progression during hospitalization  Description: INTERVENTIONS:  - Assess and monitor for signs and symptoms of infection  - Monitor lab/diagnostic results  - Monitor all insertion sites, i.e. indwelling lines, tubes, and drains  - Monitor endotracheal if appropriate and nasal secretions for changes in amount and color  - [place] appropriate cooling/warming therapies per order  - Administer medications as ordered  - Instruct and encourage patient and family to use good hand hygiene technique  - Identify and instruct in appropriate isolation precautions for identified infection/condition  Outcome: Progressing  Goal: Absence of fever/infection during neutropenic period  Description: INTERVENTIONS:  - Monitor WBC    Outcome: Progressing     Problem: SAFETY ADULT  Goal: Patient will remain free of falls  Description: INTERVENTIONS:  - Educate patient/family on patient safety including physical limitations  - Instruct patient to call for assistance with activity   - Consult OT/PT to assist with strengthening/mobility   - Keep Call bell within reach  - Keep bed low and locked with side rails adjusted as appropriate  - Keep care items and personal belongings within reach  - Initiate and maintain comfort rounds  - Make Fall Risk Sign visible to staff  - Offer Toileting every  Hours, in advance  of need  - Initiate/Maintain alarm  - Obtain necessary fall risk management equipment:   - Apply yellow socks and bracelet for high fall risk patients  - Consider moving patient to room near nurses station  Outcome: Progressing  Goal: Maintain or return to baseline ADL function  Description: INTERVENTIONS:  -  Assess patient's ability to carry out ADLs; assess patient's baseline for ADL function and identify physical deficits which impact ability to perform ADLs (bathing, care of mouth/teeth, toileting, grooming, dressing, etc.)  - Assess/evaluate cause of self-care deficits   - Assess range of motion  - Assess patient's mobility; develop plan if impaired  - Assess patient's need for assistive devices and provide as appropriate  - Encourage maximum independence but intervene and supervise when necessary  - Involve family in performance of ADLs  - Assess for home care needs following discharge   - Consider OT consult to assist with ADL evaluation and planning for discharge  - Provide patient education as appropriate  Outcome: Progressing  Goal: Maintains/Returns to pre admission functional level  Description: INTERVENTIONS:  - Perform AM-PAC 6 Click Basic Mobility/ Daily Activity assessment daily.  - Set and communicate daily mobility goal to care team and patient/family/caregiver.   - Collaborate with rehabilitation services on mobility goals if consulted  - Perform Range of Motion  times a day.  - Reposition patient every  hours.  - Dangle patient  times a day  - Stand patient  times a day  - Ambulate patient  times a day  - Out of bed to chair  times a day   - Out of bed for meals  times a day  - Out of bed for toileting  - Record patient progress and toleration of activity level   Outcome: Progressing

## 2024-11-08 NOTE — ASSESSMENT & PLAN NOTE
[facility] MWF.  Access: Left arm AVF.  EDW: 90 kg.  Electrolytes are stable.  Regular HD today.

## 2024-11-08 NOTE — HEMODIALYSIS
Post-Dialysis RN Treatment Note    Blood Pressure:  Pre 200/95 mm/Hg  Post 200/98 mmHg   EDW  90 kg    Weight:  Pre 89.5 kg   Post 89.5 kg   Mode of weight measurement: Standing Scale   Volume Removed  0 ml    Treatment duration 210 minutes    NS given  No    Treatment shortened? Yes, describe: Patient census   Medications given during Rx None Reported   Estimated Kt/V  None Reported   Access type: AV fistula   Access Issues: No    Report called to primary nurse   Yes

## 2024-11-08 NOTE — PLAN OF CARE
Pt on HD treatment for 4 hours with a uf goal of even. Pt. On a 3 k 2.5 amara bath for a potassium of 4.1 on 11/07/24, labs pending.  Problem: METABOLIC, FLUID AND ELECTROLYTES - ADULT  Goal: Electrolytes maintained within normal limits  Description: INTERVENTIONS:  - Monitor labs and assess patient for signs and symptoms of electrolyte imbalances  - Administer electrolyte replacement as ordered  - Monitor response to electrolyte replacements, including repeat lab results as appropriate  - Instruct patient on fluid and nutrition as appropriate  Outcome: Progressing  Goal: Fluid balance maintained  Description: INTERVENTIONS:  - Monitor labs   - Monitor I/O and WT  - Instruct patient on fluid and nutrition as appropriate  - Assess for signs & symptoms of volume excess or deficit  Outcome: Progressing

## 2024-11-08 NOTE — ASSESSMENT & PLAN NOTE
A single right posterior temporal sharp wave discharge suggests a possible source of seizures. Diffuse theta and delta activities suggest mild nonspecific diffuse cerebral dysfunction.   Per discussion with Neurology; no antiepileptic drugs planned.    PLAN  -Follow up in 6 weeks with epilepsy team.  No outpatient neurological testing.  Schedule by neurology scheduling team.

## 2024-11-08 NOTE — PROGRESS NOTES
Progress Note - Hospitalist   Name: Mateus Mckeon 39 y.o. male I MRN: [id number]  Unit/Bed#: S -01 I Date of Admission: 11/5/2024   Date of Service: 11/8/2024 I Hospital Day: 3    Assessment & Plan  Hypertensive emergency  Patient evaluated in ED as stroke alert but found to be hypertensive around 220/105, persisted even after 10 mg hydralazine and 40 mg labetalol, nicardipine drip started in emergency department and patient admitted to the ICU.  -Initial presentation of worsening dizziness and found ot have BP up to 230s.  -Patient transferred to [facility] from ICU  BP Readings from Last 1 Encounters:   11/08/24 (!) 177/103       Patient initially stable on home BP medication in ICU  [facility]  Currently trending down.    Plan:  Resumed home antihypertensives  Added Hydralazine 10mg IV q6 PRN for BP >180  Monitor PRN hydralazine use for transition to oral if needed.  If BP remains elevated, would add hydralazine 50 mg TID but hold for SBP < 140 mm Hg.   ESRD on hemodialysis ([facility])  Lab Results   Component Value Date    EGFR 5 11/08/2024    EGFR 7 11/07/2024    EGFR 16 11/06/2024    CREATININE 10.54 (H) 11/08/2024    CREATININE 7.91 (H) 11/07/2024    CREATININE 4.23 (H) 11/06/2024     Patient with Hemodialysis schedule MWF    PLAN  -Continue with regular schedule  Type 1 diabetes mellitus on insulin therapy ([facility])  Lab Results   Component Value Date    HGBA1C 6.9 (H) 11/05/2024       Recent Labs     11/07/24  1814 11/08/24  0022 11/08/24  0744 11/08/24  1200   POCGLU 180* 134 155* 132       Blood Sugar Average: Last 72 hrs:  (P) [id number]    Pt has glucose monitor and insulin pump in place   pt refuses removal unless necessary for MRI.     Plan:  Maintain euglycemia  Anemia in ESRD (end-stage renal disease)  ([facility])  Lab Results   Component Value Date    EGFR 5 11/08/2024    EGFR 7 11/07/2024    EGFR 16 11/06/2024    CREATININE 10.54 (H)  11/08/2024    CREATININE 7.91 (H) 11/07/2024    CREATININE 4.23 (H) 11/06/2024     Continue to monitor  Not on EPO at home  Chronic kidney disease-mineral and bone disorder  Lab Results   Component Value Date    EGFR 5 11/08/2024    EGFR 7 11/07/2024    EGFR 16 11/06/2024    CREATININE 10.54 (H) 11/08/2024    CREATININE 7.91 (H) 11/07/2024    CREATININE 4.23 (H) 11/06/2024     Followed by nephrology  Continue Sensipar 120 mg daily and calcitriol 0.75 mcg with HD for 2HPT.  He is on Velphoro for hyperphosphatemia.  Phos is at goal.   Ca at goal.   Myoclonic jerking  A single right posterior temporal sharp wave discharge suggests a possible source of seizures. Diffuse theta and delta activities suggest mild nonspecific diffuse cerebral dysfunction.   Per discussion with Neurology; no antiepileptic drugs planned.    PLAN  -Follow up in 6 weeks with epilepsy team.  No outpatient neurological testing.  Schedule by neurology scheduling team.    VTE Pharmacologic Prophylaxis:   High Risk (Score >/= 5) - Pharmacological DVT Prophylaxis Ordered: heparin. Sequential Compression Devices Ordered.    Mobility:   Basic Mobility Inpatient Raw Score: 20  JH-HLM Goal: 6: Walk 10 steps or more  JH-HLM Achieved: 7: Walk 25 feet or more  JH-HLM Goal achieved. Continue to encourage appropriate mobility.    Patient Centered Rounds: I performed bedside rounds with nursing staff today.   Discussions with Specialists or Other Care Team Provider: Attending Dr Mckeon    Education and Discussions with Family / Patient: Updated  (mother) via phone.    Current Length of Stay: 3 day(s)  Current Patient Status: Inpatient       Code Status: Level 1 - Full Code    Subjective   Patient seen and examined at bedside.  No acute overnight events reported.  Patient endorsing no acute symptoms.  Has chronic lightheadedness; endorsed having fast short headaches every 2 hours rated 3/10 stating they end before he has time to ask for tylenol.  No further symptoms    Objective :  Temp:  [97.9 °F (36.6 °C)-99.2 °F (37.3 °C)] 97.9 °F (36.6 °C)  HR:  [60-75] 75  BP: (143-218)/() 177/103  Resp:  [14-18] 14  SpO2:  [94 %-100 %] 100 %  O2 Device: None (Room air)    Body mass index is 27.77 kg/m².     Input and Output Summary (last 24 hours):     Intake/Output Summary (Last 24 hours) at 11/8/2024 1355  Last data filed at 11/8/2024 1250  Gross per 24 hour   Intake 1160 ml   Output 500 ml   Net 660 ml       Physical Exam  Vitals reviewed.   Constitutional:       General: He is not in acute distress.     Appearance: He is well-developed.   HENT:      Head: Normocephalic and atraumatic.   Cardiovascular:      Rate and Rhythm: Normal rate and regular rhythm.      Heart sounds: No murmur heard.  Pulmonary:      Effort: Pulmonary effort is normal. No respiratory distress.      Breath sounds: Normal breath sounds.   Abdominal:      Palpations: Abdomen is soft.      Tenderness: There is no abdominal tenderness.   Musculoskeletal:         General: No swelling.   Skin:     General: Skin is warm and dry.      Capillary Refill: Capillary refill takes less than 2 seconds.   Neurological:      Mental Status: He is alert.   Psychiatric:         Mood and Affect: Mood normal.           Lines/Drains:              Lab Results: I have reviewed the following results:   Results from last 7 days   Lab Units 11/08/24  0913   WBC Thousand/uL 4.11*   HEMOGLOBIN g/dL 10.6*   HEMATOCRIT % 31.5*   PLATELETS Thousands/uL 181   SEGS PCT % 51   LYMPHO PCT % 29   MONO PCT % 11   EOS PCT % 7*     Results from last 7 days   Lab Units 11/08/24  0913 11/07/24  0538 11/06/24  1214   SODIUM mmol/L 136   < > 137   POTASSIUM mmol/L 4.4   < > 4.3   CHLORIDE mmol/L 96   < > 96   CO2 mmol/L 29   < > 28   BUN mg/dL 31*   < > 10   CREATININE mg/dL 10.54*   < > 4.23*   ANION GAP mmol/L 11   < > 13   CALCIUM mg/dL 8.7   < > 9.0   ALBUMIN g/dL  --   --  4.3   TOTAL BILIRUBIN mg/dL  --   --  0.81   ALK PHOS  U/L  --   --  117*   ALT U/L  --   --  14   AST U/L  --   --  23   GLUCOSE RANDOM mg/dL 194*   < > 138    < > = values in this interval not displayed.     Results from last 7 days   Lab Units 11/06/24  0452   INR  0.99     Results from last 7 days   Lab Units 11/08/24  1200 11/08/24  0744 11/08/24  0022 11/07/24  1814 11/07/24  1210 11/07/24  0157 11/07/24  0031 11/06/24  1750 11/06/24  1140 11/06/24  0548 11/05/24  2336 11/05/24  1626   POC GLUCOSE mg/dl 132 155* 134 180* 211* 179* 224* 186* 120 154* 182* 170*     Results from last 7 days   Lab Units 11/05/24  2034   HEMOGLOBIN A1C % 6.9*     Results from last 7 days   Lab Units 11/06/24  1214   LACTIC ACID mmol/L 1.4       Recent Cultures (last 7 days):         Imaging Results Review: I reviewed radiology reports from this admission including: MRI brain.  Other Study Results Review: No additional pertinent studies reviewed.    Last 24 Hours Medication List:     Current Facility-Administered Medications:     aspirin chewable tablet 81 mg, Daily    atorvastatin (LIPITOR) tablet 40 mg, HS    calcitriol (ROCALTROL) capsule 0.75 mcg, Once per day on Monday Wednesday Friday    cinacalcet (SENSIPAR) tablet 120 mg, Daily With Breakfast    clopidogrel (PLAVIX) tablet 75 mg, Daily    escitalopram (LEXAPRO) tablet 20 mg, Daily    famotidine (PEPCID) tablet 10 mg, Daily    gabapentin (NEURONTIN) capsule 100 mg, Daily **AND** gabapentin (NEURONTIN) capsule 200 mg, HS    heparin (porcine) subcutaneous injection 5,000 Units, Q8H HALIE **AND** [COMPLETED] Platelet count, Once    hydrALAZINE (APRESOLINE) injection 10 mg, Q6H PRN    insulin aspart (NovoLOG) FOR PUMP REFILLS 120 Units, Daily PRN    labetalol (NORMODYNE) tablet 400 mg, Q8H HALIE    losartan (COZAAR) tablet 100 mg, Daily    NIFEdipine (PROCARDIA XL) 24 hr tablet 90 mg, Daily    ondansetron (ZOFRAN) injection 4 mg, Q4H PRN    PATIENT MAINTAINED INSULIN PUMP 1 each, Q8H    Administrative Statements   Today, Patient Was Seen  By: Aime Brown MD    **Please Note: This note may have been constructed using a voice recognition system.**

## 2024-11-08 NOTE — PROGRESS NOTES
NEPHROLOGY HOSPITAL PROGRESS NOTE   Mateus Mckeon 39 y.o. male MRN: [id number]  Unit/Bed#: S -01 Encounter: [id number]  Reason for Consult: ESRD on HD    Brief History of Admission - 40 yo with HTN, DM, HLP, ESRD on HD, CVA presenting to [facility] on November 5, 2024 with dizziness and was found to have accelerated hypertension on presentation.  Renal consulted for ESRD on HD.  Assessment & Plan  ESRD on hemodialysis ([facility])  [facility].  Access: Left arm AVF.  EDW: 90 kg.  Electrolytes are stable.  Regular HD today.    Hypertensive emergency  Noted to have accelerated hypertension on presentation.  Home medications per MAR: Labetalol 400 mg TID, Nifedipine 90 mg OD, Olmesartan 40 mg OD - confirmed with mother.   S/p Cardene drip.   BP better yesterday but high on HD today.   EDW lowered to 89.5 kg.   Running even on HD today.   See below regarding BP meds plan.    Anemia in ESRD (end-stage renal disease)  ([facility])  Hgb at goal. 10.6 today   Not on HETAL at this time.     Chronic kidney disease-mineral and bone disorder  Continue Sensipar 120 mg daily and calcitriol 0.75 mcg with HD for 2HPT.  He is on Velphoro for hyperphosphatemia.  Phos is at goal.   Ca at goal.     Type 1 diabetes mellitus on insulin therapy ([facility])  Defer to primary team.  Myoclonic jerking      PLAN SUMMARY:  Regular HD today.  Run even on HD today.  Continue labetalol, nifedipine, and losartan at current doses at this time.  Monitor BP 2 hours after completion of dialysis.    If BP remains elevated, would add hydralazine 50 mg TID but hold for SBP < 140 mm Hg.     The plan was discussed with [facility] internal medicine team and we are in agreement with the BP medication adjustment as outlined.    SUBJECTIVE / 24H INTERVAL HISTORY:  No complaints.   No CP or SOB.   BP high on HD.     HEMODIALYSIS PROCEDURE NOTE  The patient was seen and examined on hemodialysis.  Time: 3.5 hours  Sodium: 137 Blood flow: 400   Dialyzer:  F160 Potassium: 3 Dialysate flow: 800   Access: L arm AVF Bicarbonate: 35 Ultrafiltration goal: Even   Medications on HD: none.      OBJECTIVE:  Current Weight: Weight - Scale: 90.3 kg (199 lb 1.2 oz)  Vitals:    11/08/24 1100 11/08/24 1130 11/08/24 1200 11/08/24 1230   BP: (!) 208/92 (!) 203/94 (!) 208/98 (!) 189/99   Pulse: 64 65 65 67   Resp: 14 14 14 14   Temp:       TempSrc:       SpO2:       Weight:       Height:           Intake/Output Summary (Last 24 hours) at 11/8/2024 1244  Last data filed at 11/8/2024 0930  Gross per 24 hour   Intake 1160 ml   Output 0 ml   Net 1160 ml     General: conscious, cooperative, no distress  Skin: dry  Eyes: pink conjunctivae  ENT: moist mucous membranes  Respiratory: equal chest expansion, clear breath sounds.  Cardiovascular: distinct heart sounds, normal rate, regular rhythm, no rub  Abdomen: soft, non tender, non distended, normal bowel sounds  Extremities: no edema.   Genitourinary: no preston catheter.   Neuro: awake, alert.   Psych: appropriate affect    Medications:    Current Facility-Administered Medications:     aspirin chewable tablet 81 mg, 81 mg, Oral, Daily, Chaka Olson DO, 81 mg at 11/08/24 0809    atorvastatin (LIPITOR) tablet 40 mg, 40 mg, Oral, HS, Chaka Olson DO, 40 mg at 11/06/24 2146    calcitriol (ROCALTROL) capsule 0.75 mcg, 0.75 mcg, Oral, Once per day on Monday Wednesday Friday, Chaka Olson DO, 0.75 mcg at 11/08/24 0809    cinacalcet (SENSIPAR) tablet 120 mg, 120 mg, Oral, Daily With Breakfast, Chaka Olson DO, 120 mg at 11/08/24 0809    clopidogrel (PLAVIX) tablet 75 mg, 75 mg, Oral, Daily, Chaka Olson DO, 75 mg at 11/08/24 0810    escitalopram (LEXAPRO) tablet 20 mg, 20 mg, Oral, Daily, Chaka Olson DO, 20 mg at 11/08/24 0810    famotidine (PEPCID) tablet 10 mg, 10 mg, Oral, Daily, Chaka Olson DO, 10 mg at 11/07/24 0810    gabapentin (NEURONTIN) capsule 100 mg, 100 mg, Oral, Daily, 100 mg at 11/08/24 0810 **AND** gabapentin (NEURONTIN) capsule 200  "mg, 200 mg, Oral, HS, Chaka Olson DO    heparin (porcine) subcutaneous injection 5,000 Units, 5,000 Units, Subcutaneous, Q8H HALIE, 5,000 Units at 11/08/24 0649 **AND** [COMPLETED] Platelet count, , , Once, Aime Gee MD    hydrALAZINE (APRESOLINE) injection 10 mg, 10 mg, Intravenous, Q6H PRN, Aime Brown MD, 10 mg at 11/08/24 0952    insulin aspart (NovoLOG) FOR PUMP REFILLS 120 Units, 120 Units, Subcutaneous Insulin Pump, Daily PRN, Chaka Olson DO    labetalol (NORMODYNE) tablet 400 mg, 400 mg, Oral, Q8H HALIE, Chaka Olson DO, 400 mg at 11/08/24 0649    losartan (COZAAR) tablet 100 mg, 100 mg, Oral, Daily, Chaka Olson DO, 100 mg at 11/08/24 0809    NIFEdipine (PROCARDIA XL) 24 hr tablet 90 mg, 90 mg, Oral, Daily, Chaka Olson DO, 90 mg at 11/08/24 0810    ondansetron (ZOFRAN) injection 4 mg, 4 mg, Intravenous, Q4H PRN, Chaka Olson DO, 4 mg at 11/07/24 0600    PATIENT MAINTAINED INSULIN PUMP 1 each, 1 each, Subcutaneous, Q8H, Chaka Olson DO    Laboratory Results:  Results from last 7 days   Lab Units 11/08/24  0913 11/07/24  0538 11/06/24  1214 11/06/24  0452 11/05/24  2034 11/05/24  1606   WBC Thousand/uL 4.11* 4.18* 6.45 5.16  --  5.71   HEMOGLOBIN g/dL 10.6* 12.2 13.0 11.0*  --  12.0   HEMATOCRIT % 31.5* 37.2 37.8 32.9*  --  35.9*   PLATELETS Thousands/uL 181 197 228 179 185 206   POTASSIUM mmol/L 4.4 4.1 4.3 3.8  --  4.5   CHLORIDE mmol/L 96 97 96 97  --  100   CO2 mmol/L 29 31 28 31  --  35*   BUN mg/dL 31* 21 10 27*  --  25   CREATININE mg/dL 10.54* 7.91* 4.23* 9.75*  --  8.71*   CALCIUM mg/dL 8.7 9.1 9.0 8.4  --  8.1*   MAGNESIUM mg/dL 2.5 2.3 2.0 2.4 2.5  --    PHOSPHORUS mg/dL 4.8*  --  1.4* 4.0 3.6  --      Portions of the record may have been created with voice recognition software. Occasional wrong word or \"sound a like\" substitutions may have occurred due to the inherent limitations of voice recognition software. Read the chart carefully and recognize, using context, where substitutions " have occurred.If you have any questions, please contact the dictating provider.

## 2024-11-08 NOTE — ASSESSMENT & PLAN NOTE
Patient evaluated in ED as stroke alert but found to be hypertensive around 220/105, persisted even after 10 mg hydralazine and 40 mg labetalol, nicardipine drip started in emergency department and patient admitted to the ICU.  -Initial presentation of worsening dizziness and found ot have BP up to 230s.  -Patient transferred to [facility] from ICU  BP Readings from Last 1 Encounters:   11/08/24 (!) 177/103       Patient initially stable on home BP medication in ICU  Patient BP elevated to 200s while in [facility] during hemodialysis.  Currently trending down.    Plan:  Resumed home antihypertensives  Added Hydralazine 10mg IV q6 PRN for BP >180  Monitor PRN hydralazine use for transition to oral if needed.  If BP remains elevated, would add hydralazine 50 mg TID but hold for SBP < 140 mm Hg.

## 2024-11-08 NOTE — PLAN OF CARE
Problem: METABOLIC, FLUID AND ELECTROLYTES - ADULT  Goal: Electrolytes maintained within normal limits  Description: INTERVENTIONS:  - Monitor labs and assess patient for signs and symptoms of electrolyte imbalances  - Administer electrolyte replacement as ordered  - Monitor response to electrolyte replacements, including repeat lab results as appropriate  - Instruct patient on fluid and nutrition as appropriate  Outcome: Progressing  Goal: Fluid balance maintained  Description: INTERVENTIONS:  - Monitor labs   - Monitor I/O and WT  - Instruct patient on fluid and nutrition as appropriate  - Assess for signs & symptoms of volume excess or deficit  Outcome: Progressing     Problem: Nutrition/Hydration-ADULT  Goal: Nutrient/Hydration intake appropriate for improving, restoring or maintaining nutritional needs  Description: Monitor and assess patient's nutrition/hydration status for malnutrition. Collaborate with interdisciplinary team and initiate plan and interventions as ordered.  Monitor patient's weight and dietary intake as ordered or per policy. Utilize nutrition screening tool and intervene as necessary. Determine patient's food preferences and provide high-protein, high-caloric foods as appropriate.     INTERVENTIONS:  - Monitor oral intake, urinary output, labs, and treatment plans  - Assess nutrition and hydration status and recommend course of action  - Evaluate amount of meals eaten  - Assist patient with eating if necessary   - Allow adequate time for meals  - Recommend/ encourage appropriate diets, oral nutritional supplements, and vitamin/mineral supplements  - Order, calculate, and assess calorie counts as needed  - Recommend, monitor, and adjust tube feedings and TPN/PPN based on assessed needs  - Assess need for intravenous fluids  - Provide specific nutrition/hydration education as appropriate  - Include patient/family/caregiver in decisions related to nutrition  Outcome: Progressing     Problem:  PAIN - ADULT  Goal: Verbalizes/displays adequate comfort level or baseline comfort level  Description: Interventions:  - Encourage patient to monitor pain and request assistance  - Assess pain using appropriate pain scale  - Administer analgesics based on type and severity of pain and evaluate response  - Implement non-pharmacological measures as appropriate and evaluate response  - Consider cultural and social influences on pain and pain management  - Notify physician/advanced practitioner if interventions unsuccessful or patient reports new pain  Outcome: Progressing     Problem: INFECTION - ADULT  Goal: Absence or prevention of progression during hospitalization  Description: INTERVENTIONS:  - Assess and monitor for signs and symptoms of infection  - Monitor lab/diagnostic results  - Monitor all insertion sites, i.e. indwelling lines, tubes, and drains  - Monitor endotracheal if appropriate and nasal secretions for changes in amount and color  - [place] appropriate cooling/warming therapies per order  - Administer medications as ordered  - Instruct and encourage patient and family to use good hand hygiene technique  - Identify and instruct in appropriate isolation precautions for identified infection/condition  Outcome: Progressing  Goal: Absence of fever/infection during neutropenic period  Description: INTERVENTIONS:  - Monitor WBC    Outcome: Progressing     Problem: SAFETY ADULT  Goal: Patient will remain free of falls  Description: INTERVENTIONS:  - Educate patient/family on patient safety including physical limitations  - Instruct patient to call for assistance with activity   - Consult OT/PT to assist with strengthening/mobility   - Keep Call bell within reach  - Keep bed low and locked with side rails adjusted as appropriate  - Keep care items and personal belongings within reach  - Initiate and maintain comfort rounds  - Make Fall Risk Sign visible to staff  - Offer Toileting every 2 Hours, in advance  of need  - Apply yellow socks and bracelet for high fall risk patients  - Consider moving patient to room near nurses station  Outcome: Progressing  Goal: Maintain or return to baseline ADL function  Description: INTERVENTIONS:  -  Assess patient's ability to carry out ADLs; assess patient's baseline for ADL function and identify physical deficits which impact ability to perform ADLs (bathing, care of mouth/teeth, toileting, grooming, dressing, etc.)  - Assess/evaluate cause of self-care deficits   - Assess range of motion  - Assess patient's mobility; develop plan if impaired  - Assess patient's need for assistive devices and provide as appropriate  - Encourage maximum independence but intervene and supervise when necessary  - Involve family in performance of ADLs  - Assess for home care needs following discharge   - Consider OT consult to assist with ADL evaluation and planning for discharge  - Provide patient education as appropriate  Outcome: Progressing  Goal: Maintains/Returns to pre admission functional level  Description: INTERVENTIONS:  - Perform AM-PAC 6 Click Basic Mobility/ Daily Activity assessment daily.  - Set and communicate daily mobility goal to care team and patient/family/caregiver.   - Collaborate with rehabilitation services on mobility goals if consulted  - Perform Range of Motion 3 times a day.  - Reposition patient every 2 hours.  - Dangle patient 3 times a day  - Stand patient 3 times a day  - Ambulate patient 3 times a day  - Out of bed to chair 3 times a day   - Out of bed for meals 3 times a day  - Out of bed for toileting  - Record patient progress and toleration of activity level   Outcome: Progressing

## 2024-11-08 NOTE — ASSESSMENT & PLAN NOTE
Lab Results   Component Value Date    EGFR 5 11/08/2024    EGFR 7 11/07/2024    EGFR 16 11/06/2024    CREATININE 10.54 (H) 11/08/2024    CREATININE 7.91 (H) 11/07/2024    CREATININE 4.23 (H) 11/06/2024     Followed by nephrology  Continue Sensipar 120 mg daily and calcitriol 0.75 mcg with HD for 2HPT.  He is on Velphoro for hyperphosphatemia.  Phos is at goal.   Ca at goal.

## 2024-11-09 LAB
ANION GAP SERPL CALCULATED.3IONS-SCNC: 12 MMOL/L (ref 4–13)
BUN SERPL-MCNC: 21 MG/DL (ref 5–25)
CALCIUM SERPL-MCNC: 8.3 MG/DL (ref 8.4–10.2)
CHLORIDE SERPL-SCNC: 98 MMOL/L (ref 96–108)
CO2 SERPL-SCNC: 26 MMOL/L (ref 21–32)
CREAT SERPL-MCNC: 7.88 MG/DL (ref 0.6–1.3)
ERYTHROCYTE [DISTWIDTH] IN BLOOD BY AUTOMATED COUNT: 13.3 % (ref 11.6–15.1)
GFR SERPL CREATININE-BSD FRML MDRD: 7 ML/MIN/1.73SQ M
GLUCOSE SERPL-MCNC: 164 MG/DL (ref 65–140)
GLUCOSE SERPL-MCNC: 179 MG/DL (ref 65–140)
GLUCOSE SERPL-MCNC: 207 MG/DL (ref 65–140)
HCT VFR BLD AUTO: 33.6 % (ref 36.5–49.3)
HGB BLD-MCNC: 11 G/DL (ref 12–17)
MCH RBC QN AUTO: 32.5 PG (ref 26.8–34.3)
MCHC RBC AUTO-ENTMCNC: 32.7 G/DL (ref 31.4–37.4)
MCV RBC AUTO: 99 FL (ref 82–98)
PHOSPHATE SERPL-MCNC: 4.2 MG/DL (ref 2.7–4.5)
PLATELET # BLD AUTO: 191 THOUSANDS/UL (ref 149–390)
PMV BLD AUTO: 10.9 FL (ref 8.9–12.7)
POTASSIUM SERPL-SCNC: 4.5 MMOL/L (ref 3.5–5.3)
RBC # BLD AUTO: 3.38 MILLION/UL (ref 3.88–5.62)
SODIUM SERPL-SCNC: 136 MMOL/L (ref 135–147)
WBC # BLD AUTO: 4.89 THOUSAND/UL (ref 4.31–10.16)

## 2024-11-09 PROCEDURE — 80048 BASIC METABOLIC PNL TOTAL CA: CPT

## 2024-11-09 PROCEDURE — 85027 COMPLETE CBC AUTOMATED: CPT

## 2024-11-09 PROCEDURE — 99232 SBSQ HOSP IP/OBS MODERATE 35: CPT | Performed by: INTERNAL MEDICINE

## 2024-11-09 PROCEDURE — 99233 SBSQ HOSP IP/OBS HIGH 50: CPT | Performed by: HOSPITALIST

## 2024-11-09 PROCEDURE — 82948 REAGENT STRIP/BLOOD GLUCOSE: CPT

## 2024-11-09 PROCEDURE — 84100 ASSAY OF PHOSPHORUS: CPT

## 2024-11-09 RX ORDER — LOSARTAN POTASSIUM 50 MG/1
100 TABLET ORAL
Status: DISCONTINUED | OUTPATIENT
Start: 2024-11-10 | End: 2024-11-09

## 2024-11-09 RX ORDER — HYDRALAZINE HYDROCHLORIDE 25 MG/1
50 TABLET, FILM COATED ORAL EVERY 8 HOURS SCHEDULED
Status: DISCONTINUED | OUTPATIENT
Start: 2024-11-09 | End: 2024-11-10 | Stop reason: HOSPADM

## 2024-11-09 RX ORDER — LOSARTAN POTASSIUM 50 MG/1
100 TABLET ORAL
Status: DISCONTINUED | OUTPATIENT
Start: 2024-11-09 | End: 2024-11-10 | Stop reason: HOSPADM

## 2024-11-09 RX ORDER — HYDRALAZINE HYDROCHLORIDE 25 MG/1
25 TABLET, FILM COATED ORAL EVERY 8 HOURS SCHEDULED
Status: DISCONTINUED | OUTPATIENT
Start: 2024-11-09 | End: 2024-11-09

## 2024-11-09 RX ADMIN — LABETALOL HYDROCHLORIDE 400 MG: 200 TABLET, FILM COATED ORAL at 21:18

## 2024-11-09 RX ADMIN — LABETALOL HYDROCHLORIDE 400 MG: 200 TABLET, FILM COATED ORAL at 14:42

## 2024-11-09 RX ADMIN — GABAPENTIN 200 MG: 100 CAPSULE ORAL at 21:17

## 2024-11-09 RX ADMIN — ASPIRIN 81 MG 81 MG: 81 TABLET ORAL at 08:51

## 2024-11-09 RX ADMIN — LOSARTAN POTASSIUM 100 MG: 50 TABLET, FILM COATED ORAL at 07:23

## 2024-11-09 RX ADMIN — ONDANSETRON 4 MG: 2 INJECTION INTRAMUSCULAR; INTRAVENOUS at 07:50

## 2024-11-09 RX ADMIN — LABETALOL HYDROCHLORIDE 400 MG: 200 TABLET, FILM COATED ORAL at 06:22

## 2024-11-09 RX ADMIN — HYDRALAZINE HYDROCHLORIDE 25 MG: 25 TABLET ORAL at 08:51

## 2024-11-09 RX ADMIN — ESCITALOPRAM OXALATE 20 MG: 20 TABLET ORAL at 08:51

## 2024-11-09 RX ADMIN — FAMOTIDINE 10 MG: 20 TABLET ORAL at 08:51

## 2024-11-09 RX ADMIN — HEPARIN SODIUM 5000 UNITS: 5000 INJECTION INTRAVENOUS; SUBCUTANEOUS at 21:17

## 2024-11-09 RX ADMIN — LOSARTAN POTASSIUM 100 MG: 50 TABLET, FILM COATED ORAL at 21:18

## 2024-11-09 RX ADMIN — HYDRALAZINE HYDROCHLORIDE 50 MG: 25 TABLET ORAL at 14:42

## 2024-11-09 RX ADMIN — NIFEDIPINE 90 MG: 30 TABLET, FILM COATED, EXTENDED RELEASE ORAL at 07:22

## 2024-11-09 RX ADMIN — HEPARIN SODIUM 5000 UNITS: 5000 INJECTION INTRAVENOUS; SUBCUTANEOUS at 14:44

## 2024-11-09 RX ADMIN — INSULIN ASPART 120 UNITS: 100 INJECTION, SOLUTION INTRAVENOUS; SUBCUTANEOUS at 17:44

## 2024-11-09 RX ADMIN — GABAPENTIN 100 MG: 100 CAPSULE ORAL at 08:51

## 2024-11-09 RX ADMIN — HYDRALAZINE HYDROCHLORIDE 50 MG: 25 TABLET ORAL at 21:18

## 2024-11-09 RX ADMIN — HEPARIN SODIUM 5000 UNITS: 5000 INJECTION INTRAVENOUS; SUBCUTANEOUS at 06:25

## 2024-11-09 RX ADMIN — CINACALCET 120 MG: 30 TABLET ORAL at 08:51

## 2024-11-09 RX ADMIN — ATORVASTATIN CALCIUM 40 MG: 40 TABLET, FILM COATED ORAL at 21:17

## 2024-11-09 NOTE — ASSESSMENT & PLAN NOTE
Noted to have accelerated hypertension on presentation.  Home medications per MAR: Labetalol 400 mg TID, Nifedipine 90 mg OD, Olmesartan 40 mg OD - confirmed with mother.   S/p Cardene drip.   BP high this AM.   EDW lowered to 89.5 kg this admission .  Add hydralazine 50 mg TID and hold for SBP < 150 mm Hg.   Continue nifedipine 90 mg daily and labetalol 400 mg q8H.   Move losartan to the evening.

## 2024-11-09 NOTE — ASSESSMENT & PLAN NOTE
Lab Results   Component Value Date    EGFR 7 11/09/2024    EGFR 5 11/08/2024    EGFR 7 11/07/2024    CREATININE 7.88 (H) 11/09/2024    CREATININE 10.54 (H) 11/08/2024    CREATININE 7.91 (H) 11/07/2024     Followed by nephrology  Continue Sensipar 120 mg daily and calcitriol 0.75 mcg with HD for 2HPT.  He is on Velphoro for hyperphosphatemia.  Phos is at goal.

## 2024-11-09 NOTE — PROGRESS NOTES
Progress Note - Hospitalist   Name: Mateus Mckeon 39 y.o. male I MRN: [id number]  Unit/Bed#: S -01 I Date of Admission: 11/5/2024   Date of Service: 11/9/2024 I Hospital Day: 4    Assessment & Plan  Hypertensive emergency  Patient evaluated in ED as stroke alert but found to be hypertensive around 220/105, persisted even after 10 mg hydralazine and 40 mg labetalol, nicardipine drip started in emergency department and patient admitted to the ICU.  -Initial presentation of worsening dizziness and found ot have BP up to 230s.  -Patient transferred to [facility] from ICU  BP Readings from Last 1 Encounters:   11/09/24 162/84       Patient initially stable on home BP medication in ICU  Patient BP elevated to 200s while in [facility] during hemodialysis.  Currently trending down.  11/9 BP mazin to 219/100    Plan:  Continue home antihypertensives  Nephrology on board  Hold Hydralazine 10mg IV q6 PRN for BP >180  Add hydralazine 50 mg TID but hold for SBP < 150 mm Hg.   Changed scheduling of Losartan to evening dosing.  Patient received daytime dosing 11/9; can receive full dose evening today due to short half life of Losartan.   ESRD on hemodialysis ([facility])  Lab Results   Component Value Date    EGFR 7 11/09/2024    EGFR 5 11/08/2024    EGFR 7 11/07/2024    CREATININE 7.88 (H) 11/09/2024    CREATININE 10.54 (H) 11/08/2024    CREATININE 7.91 (H) 11/07/2024     Patient with Hemodialysis schedule [facility]    PLAN  -Continue with regular schedule  Type 1 diabetes mellitus on insulin therapy ([facility])  Lab Results   Component Value Date    HGBA1C 6.9 (H) 11/05/2024       Recent Labs     11/07/24  1814 11/08/24  0022 11/08/24  0744 11/08/24  1200   POCGLU 180* 134 155* 132       Blood Sugar Average: Last 72 hrs:  (P) 167.5    Pt has glucose monitor and insulin pump in place   pt refuses removal unless necessary for MRI.     Plan:  Maintain euglycemia  Anemia in ESRD (end-stage renal disease)  ([facility])  Lab Results   Component Value Date     EGFR 7 11/09/2024    EGFR 5 11/08/2024    EGFR 7 11/07/2024    CREATININE 7.88 (H) 11/09/2024    CREATININE 10.54 (H) 11/08/2024    CREATININE 7.91 (H) 11/07/2024     Continue to monitor  Not on EPO at home  Chronic kidney disease-mineral and bone disorder  Lab Results   Component Value Date    EGFR 7 11/09/2024    EGFR 5 11/08/2024    EGFR 7 11/07/2024    CREATININE 7.88 (H) 11/09/2024    CREATININE 10.54 (H) 11/08/2024    CREATININE 7.91 (H) 11/07/2024     Followed by nephrology  Continue Sensipar 120 mg daily and calcitriol 0.75 mcg with HD for 2HPT.  He is on Velphoro for hyperphosphatemia.  Phos is at goal.   Myoclonic jerking  A single right posterior temporal sharp wave discharge suggests a possible source of seizures. Diffuse theta and delta activities suggest mild nonspecific diffuse cerebral dysfunction.   Per discussion with Neurology; no antiepileptic drugs planned.    PLAN  -Follow up in 6 weeks with epilepsy team.  No outpatient neurological testing.  Schedule by neurology scheduling team.  Word finding difficulty  Not present on examination in [facility] unit  Resolved at this time    VTE Pharmacologic Prophylaxis:   High Risk (Score >/= 5) - Pharmacological DVT Prophylaxis Ordered: heparin. Sequential Compression Devices Ordered.    Mobility:   Basic Mobility Inpatient Raw Score: 20  JH-HLM Goal: 6: Walk 10 steps or more  JH-HLM Achieved: 6: Walk 10 steps or more  JH-HLM Goal achieved. Continue to encourage appropriate mobility.    Patient Centered Rounds: I performed bedside rounds with nursing staff today.   Discussions with Specialists or Other Care Team Provider: Attending Dr Mckeon    Education and Discussions with Family / Patient: Updated  (mother) via phone.    Current Length of Stay: 4 day(s)  Current Patient Status: Inpatient       Code Status: Level 1 - Full Code    Subjective   Patient seen and examined at bedside.  No overnight events reported.  Patient denies any acute  symptoms.  Denies headache, worsening dizziness, chest pain, palpitations, shortness of breath.    Objective :  Temp:  [98 °F (36.7 °C)-99.4 °F (37.4 °C)] 98 °F (36.7 °C)  HR:  [63-82] 63  BP: (135-219)/() 162/84  Resp:  [14] 14  SpO2:  [94 %-100 %] 94 %    Body mass index is 27.64 kg/m².     Input and Output Summary (last 24 hours):     Intake/Output Summary (Last 24 hours) at 11/9/2024 1036  Last data filed at 11/9/2024 0701  Gross per 24 hour   Intake 660 ml   Output 500 ml   Net 160 ml       Physical Exam  Vitals and nursing note reviewed.   Constitutional:       General: He is not in acute distress.     Appearance: He is well-developed.   HENT:      Head: Normocephalic.   Cardiovascular:      Rate and Rhythm: Normal rate and regular rhythm.      Heart sounds: No murmur heard.  Pulmonary:      Effort: Pulmonary effort is normal. No respiratory distress.      Breath sounds: Normal breath sounds.   Abdominal:      Palpations: Abdomen is soft.      Tenderness: There is no abdominal tenderness.   Musculoskeletal:         General: No swelling.   Skin:     General: Skin is warm and dry.      Capillary Refill: Capillary refill takes less than 2 seconds.   Neurological:      Mental Status: He is alert.   Psychiatric:         Mood and Affect: Mood normal.           Lines/Drains:              Lab Results: I have reviewed the following results:   Results from last 7 days   Lab Units 11/09/24  0437 11/08/24  0913   WBC Thousand/uL 4.89 4.11*   HEMOGLOBIN g/dL 11.0* 10.6*   HEMATOCRIT % 33.6* 31.5*   PLATELETS Thousands/uL 191 181   SEGS PCT %  --  51   LYMPHO PCT %  --  29   MONO PCT %  --  11   EOS PCT %  --  7*     Results from last 7 days   Lab Units 11/09/24  0437 11/07/24  0538 11/06/24  1214   SODIUM mmol/L 136   < > 137   POTASSIUM mmol/L 4.5   < > 4.3   CHLORIDE mmol/L 98   < > 96   CO2 mmol/L 26   < > 28   BUN mg/dL 21   < > 10   CREATININE mg/dL 7.88*   < > 4.23*   ANION GAP mmol/L 12   < > 13   CALCIUM  mg/dL 8.3*   < > 9.0   ALBUMIN g/dL  --   --  4.3   TOTAL BILIRUBIN mg/dL  --   --  0.81   ALK PHOS U/L  --   --  117*   ALT U/L  --   --  14   AST U/L  --   --  23   GLUCOSE RANDOM mg/dL 164*   < > 138    < > = values in this interval not displayed.     Results from last 7 days   Lab Units 11/06/24  0452   INR  0.99     Results from last 7 days   Lab Units 11/08/24  1200 11/08/24  0744 11/08/24  0022 11/07/24  1814 11/07/24  1210 11/07/24  0157 11/07/24  0031 11/06/24  1750 11/06/24  1140 11/06/24  0548 11/05/24  2336 11/05/24  1626   POC GLUCOSE mg/dl 132 155* 134 180* 211* 179* 224* 186* 120 154* 182* 170*     Results from last 7 days   Lab Units 11/05/24  2034   HEMOGLOBIN A1C % 6.9*     Results from last 7 days   Lab Units 11/06/24  1214   LACTIC ACID mmol/L 1.4       Recent Cultures (last 7 days):         Imaging Results Review: No pertinent imaging studies reviewed.  Other Study Results Review: No additional pertinent studies reviewed.    Last 24 Hours Medication List:     Current Facility-Administered Medications:     aspirin chewable tablet 81 mg, Daily    atorvastatin (LIPITOR) tablet 40 mg, HS    calcitriol (ROCALTROL) capsule 0.75 mcg, Once per day on Monday Wednesday Friday    cinacalcet (SENSIPAR) tablet 120 mg, Daily With Breakfast    escitalopram (LEXAPRO) tablet 20 mg, Daily    famotidine (PEPCID) tablet 10 mg, Daily    gabapentin (NEURONTIN) capsule 100 mg, Daily **AND** gabapentin (NEURONTIN) capsule 200 mg, HS    heparin (porcine) subcutaneous injection 5,000 Units, Q8H HALIE **AND** [COMPLETED] Platelet count, Once    [Held by provider] hydrALAZINE (APRESOLINE) injection 10 mg, Q6H PRN    hydrALAZINE (APRESOLINE) tablet 50 mg, Q8H HALIE    insulin aspart (NovoLOG) FOR PUMP REFILLS 120 Units, Daily PRN    labetalol (NORMODYNE) tablet 400 mg, Q8H [facility]    [START ON 11/10/2024] losartan (COZAAR) tablet 100 mg, HS    NIFEdipine (PROCARDIA XL) 24 hr tablet 90 mg, Daily    ondansetron (ZOFRAN) injection 4  mg, Q4H PRN    PATIENT MAINTAINED INSULIN PUMP 1 each, Q8H    Administrative Statements   Today, Patient Was Seen By: Aime Brown MD      **Please Note: This note may have been constructed using a voice recognition system.**

## 2024-11-09 NOTE — ASSESSMENT & PLAN NOTE
Lab Results   Component Value Date    HGBA1C 6.9 (H) 11/05/2024       Recent Labs     11/07/24  1814 11/08/24  0022 11/08/24  0744 11/08/24  1200   POCGLU 180* 134 155* 132       Blood Sugar Average: Last 72 hrs:  (P) 167.5    Pt has glucose monitor and insulin pump in place   pt refuses removal unless necessary for MRI.     Plan:  Maintain euglycemia   declines

## 2024-11-09 NOTE — ASSESSMENT & PLAN NOTE
Lab Results   Component Value Date    EGFR 7 11/09/2024    EGFR 5 11/08/2024    EGFR 7 11/07/2024    CREATININE 7.88 (H) 11/09/2024    CREATININE 10.54 (H) 11/08/2024    CREATININE 7.91 (H) 11/07/2024     Continue to monitor  Not on EPO at home

## 2024-11-09 NOTE — ASSESSMENT & PLAN NOTE
Lab Results   Component Value Date    EGFR 7 11/09/2024    EGFR 5 11/08/2024    EGFR 7 11/07/2024    CREATININE 7.88 (H) 11/09/2024    CREATININE 10.54 (H) 11/08/2024    CREATININE 7.91 (H) 11/07/2024     Patient with Hemodialysis schedule MWF    PLAN  -Continue with regular schedule

## 2024-11-09 NOTE — PLAN OF CARE
Problem: METABOLIC, FLUID AND ELECTROLYTES - ADULT  Goal: Electrolytes maintained within normal limits  Description: INTERVENTIONS:  - Monitor labs and assess patient for signs and symptoms of electrolyte imbalances  - Administer electrolyte replacement as ordered  - Monitor response to electrolyte replacements, including repeat lab results as appropriate  - Instruct patient on fluid and nutrition as appropriate  Outcome: Progressing  Goal: Fluid balance maintained  Description: INTERVENTIONS:  - Monitor labs   - Monitor I/O and WT  - Instruct patient on fluid and nutrition as appropriate  - Assess for signs & symptoms of volume excess or deficit  Outcome: Progressing     Problem: Nutrition/Hydration-ADULT  Goal: Nutrient/Hydration intake appropriate for improving, restoring or maintaining nutritional needs  Description: Monitor and assess patient's nutrition/hydration status for malnutrition. Collaborate with interdisciplinary team and initiate plan and interventions as ordered.  Monitor patient's weight and dietary intake as ordered or per policy. Utilize nutrition screening tool and intervene as necessary. Determine patient's food preferences and provide high-protein, high-caloric foods as appropriate.     INTERVENTIONS:  - Monitor oral intake, urinary output, labs, and treatment plans  - Assess nutrition and hydration status and recommend course of action  - Evaluate amount of meals eaten  - Assist patient with eating if necessary   - Allow adequate time for meals  - Recommend/ encourage appropriate diets, oral nutritional supplements, and vitamin/mineral supplements  - Order, calculate, and assess calorie counts as needed  - Assess need for intravenous fluids  - Provide specific nutrition/hydration education as appropriate  - Include patient/family/caregiver in decisions related to nutrition  Outcome: Progressing     Problem: PAIN - ADULT  Goal: Verbalizes/displays adequate comfort level or baseline comfort  level  Description: Interventions:  - Encourage patient to monitor pain and request assistance  - Assess pain using appropriate pain scale  - Administer analgesics based on type and severity of pain and evaluate response  - Implement non-pharmacological measures as appropriate and evaluate response  - Consider cultural and social influences on pain and pain management  - Notify physician/advanced practitioner if interventions unsuccessful or patient reports new pain  Outcome: Progressing     Problem: INFECTION - ADULT  Goal: Absence or prevention of progression during hospitalization  Description: INTERVENTIONS:  - Assess and monitor for signs and symptoms of infection  - Monitor lab/diagnostic results  - Monitor all insertion sites, i.e. indwelling lines, tubes, and drains  - Monitor endotracheal if appropriate and nasal secretions for changes in amount and color  - [place] appropriate cooling/warming therapies per order  - Administer medications as ordered  - Instruct and encourage patient and family to use good hand hygiene technique  - Identify and instruct in appropriate isolation precautions for identified infection/condition  Outcome: Progressing     Problem: SAFETY ADULT  Goal: Patient will remain free of falls  Description: INTERVENTIONS:  - Educate patient/family on patient safety including physical limitations  - Instruct patient to call for assistance with activity   - Consult OT/PT to assist with strengthening/mobility   - Keep Call bell within reach  - Keep bed low and locked with side rails adjusted as appropriate  - Keep care items and personal belongings within reach  - Initiate and maintain comfort rounds  - Make Fall Risk Sign visible to staff  - Offer Toileting every 2 Hours, in advance of need  - Apply yellow socks and bracelet for high fall risk patients  - Consider moving patient to room near nurses station  Outcome: Progressing  Goal: Maintain or return to baseline ADL  function  Description: INTERVENTIONS:  -  Assess patient's ability to carry out ADLs; assess patient's baseline for ADL function and identify physical deficits which impact ability to perform ADLs (bathing, care of mouth/teeth, toileting, grooming, dressing, etc.)  - Assess/evaluate cause of self-care deficits   - Assess range of motion  - Assess patient's mobility; develop plan if impaired  - Assess patient's need for assistive devices and provide as appropriate  - Encourage maximum independence but intervene and supervise when necessary  - Involve family in performance of ADLs  - Assess for home care needs following discharge   - Consider OT consult to assist with ADL evaluation and planning for discharge  - Provide patient education as appropriate  Outcome: Progressing  Goal: Maintains/Returns to pre admission functional level  Description: INTERVENTIONS:  - Perform AM-PAC 6 Click Basic Mobility/ Daily Activity assessment daily.  - Set and communicate daily mobility goal to care team and patient/family/caregiver.   - Collaborate with rehabilitation services on mobility goals if consulted  - Perform Range of Motion 3 times a day.  - Reposition patient every 2 hours.  - Dangle patient 3 times a day  - Stand patient 3 times a day  - Ambulate patient 3 times a day  - Out of bed to chair 3 times a day   - Out of bed for meals 3 times a day  - Out of bed for toileting  - Record patient progress and toleration of activity level   Outcome: Progressing     Problem: Knowledge Deficit  Goal: Patient/family/caregiver demonstrates understanding of disease process, treatment plan, medications, and discharge instructions  Description: Complete learning assessment and assess knowledge base.  Interventions:  - Provide teaching at level of understanding  - Provide teaching via preferred learning methods  Outcome: Progressing

## 2024-11-09 NOTE — PLAN OF CARE
Problem: METABOLIC, FLUID AND ELECTROLYTES - ADULT  Goal: Electrolytes maintained within normal limits  Description: INTERVENTIONS:  - Monitor labs and assess patient for signs and symptoms of electrolyte imbalances  - Administer electrolyte replacement as ordered  - Monitor response to electrolyte replacements, including repeat lab results as appropriate  - Instruct patient on fluid and nutrition as appropriate  Outcome: Progressing  Goal: Fluid balance maintained  Description: INTERVENTIONS:  - Monitor labs   - Monitor I/O and WT  - Instruct patient on fluid and nutrition as appropriate  - Assess for signs & symptoms of volume excess or deficit  Outcome: Progressing     Problem: Nutrition/Hydration-ADULT  Goal: Nutrient/Hydration intake appropriate for improving, restoring or maintaining nutritional needs  Description: Monitor and assess patient's nutrition/hydration status for malnutrition. Collaborate with interdisciplinary team and initiate plan and interventions as ordered.  Monitor patient's weight and dietary intake as ordered or per policy. Utilize nutrition screening tool and intervene as necessary. Determine patient's food preferences and provide high-protein, high-caloric foods as appropriate.     INTERVENTIONS:  - Monitor oral intake, urinary output, labs, and treatment plans  - Assess nutrition and hydration status and recommend course of action  - Evaluate amount of meals eaten  - Assist patient with eating if necessary   - Allow adequate time for meals  - Recommend/ encourage appropriate diets, oral nutritional supplements, and vitamin/mineral supplements  - Order, calculate, and assess calorie counts as needed  - Recommend, monitor, and adjust tube feedings and TPN/PPN based on assessed needs  - Assess need for intravenous fluids  - Provide specific nutrition/hydration education as appropriate  - Include patient/family/caregiver in decisions related to nutrition  Outcome: Progressing     Problem:  Nutrition/Hydration-ADULT  Goal: Nutrient/Hydration intake appropriate for improving, restoring or maintaining nutritional needs  Description: Monitor and assess patient's nutrition/hydration status for malnutrition. Collaborate with interdisciplinary team and initiate plan and interventions as ordered.  Monitor patient's weight and dietary intake as ordered or per policy. Utilize nutrition screening tool and intervene as necessary. Determine patient's food preferences and provide high-protein, high-caloric foods as appropriate.     INTERVENTIONS:  - Monitor oral intake, urinary output, labs, and treatment plans  - Assess nutrition and hydration status and recommend course of action  - Evaluate amount of meals eaten  - Assist patient with eating if necessary   - Allow adequate time for meals  - Recommend/ encourage appropriate diets, oral nutritional supplements, and vitamin/mineral supplements  - Order, calculate, and assess calorie counts as needed  - Recommend, monitor, and adjust tube feedings and TPN/PPN based on assessed needs  - Assess need for intravenous fluids  - Provide specific nutrition/hydration education as appropriate  - Include patient/family/caregiver in decisions related to nutrition  Outcome: Progressing     Problem: PAIN - ADULT  Goal: Verbalizes/displays adequate comfort level or baseline comfort level  Description: Interventions:  - Encourage patient to monitor pain and request assistance  - Assess pain using appropriate pain scale  - Administer analgesics based on type and severity of pain and evaluate response  - Implement non-pharmacological measures as appropriate and evaluate response  - Consider cultural and social influences on pain and pain management  - Notify physician/advanced practitioner if interventions unsuccessful or patient reports new pain  Outcome: Progressing     Problem: INFECTION - ADULT  Goal: Absence or prevention of progression during hospitalization  Description:  INTERVENTIONS:  - Assess and monitor for signs and symptoms of infection  - Monitor lab/diagnostic results  - Monitor all insertion sites, i.e. indwelling lines, tubes, and drains  - Monitor endotracheal if appropriate and nasal secretions for changes in amount and color  - [place] appropriate cooling/warming therapies per order  - Administer medications as ordered  - Instruct and encourage patient and family to use good hand hygiene technique  - Identify and instruct in appropriate isolation precautions for identified infection/condition  Outcome: Progressing  Goal: Absence of fever/infection during neutropenic period  Description: INTERVENTIONS:  - Monitor WBC    Outcome: Progressing

## 2024-11-09 NOTE — ASSESSMENT & PLAN NOTE
Patient evaluated in ED as stroke alert but found to be hypertensive around 220/105, persisted even after 10 mg hydralazine and 40 mg labetalol, nicardipine drip started in emergency department and patient admitted to the ICU.  -Initial presentation of worsening dizziness and found ot have BP up to 230s.  -Patient transferred to [facility] from ICU  BP Readings from Last 1 Encounters:   11/09/24 162/84       Patient initially stable on home BP medication in ICU  Patient BP elevated to 200s while in [facility] during hemodialysis.  Currently trending down.  11/9 BP mazin to 219/100    Plan:  Continue home antihypertensives  Nephrology on board  Hold Hydralazine 10mg IV q6 PRN for BP >180  Add hydralazine 50 mg TID but hold for SBP < 150 mm Hg.   Changed scheduling of Losartan to evening dosing.  Patient received daytime dosing 11/9; can receive full dose evening today due to short half life of Losartan.

## 2024-11-09 NOTE — ASSESSMENT & PLAN NOTE
[facility] MWF.  Access: Left arm AVF.  EDW: 90 kg.  Stable electrolytes.  Completed HD yesterday.  Next HD is Monday, November 11, 2024.

## 2024-11-10 VITALS
RESPIRATION RATE: 14 BRPM | BODY MASS INDEX: 27.99 KG/M2 | OXYGEN SATURATION: 97 % | HEIGHT: 71 IN | WEIGHT: 199.96 LBS | DIASTOLIC BLOOD PRESSURE: 97 MMHG | TEMPERATURE: 97.7 F | HEART RATE: 65 BPM | SYSTOLIC BLOOD PRESSURE: 164 MMHG

## 2024-11-10 PROBLEM — R47.89 WORD FINDING DIFFICULTY: Status: RESOLVED | Noted: 2024-11-05 | Resolved: 2024-11-10

## 2024-11-10 LAB
ANION GAP SERPL CALCULATED.3IONS-SCNC: 11 MMOL/L (ref 4–13)
ATRIAL RATE: 99 BPM
BUN SERPL-MCNC: 30 MG/DL (ref 5–25)
CALCIUM SERPL-MCNC: 8.2 MG/DL (ref 8.4–10.2)
CHLORIDE SERPL-SCNC: 98 MMOL/L (ref 96–108)
CO2 SERPL-SCNC: 29 MMOL/L (ref 21–32)
CREAT SERPL-MCNC: 10.61 MG/DL (ref 0.6–1.3)
GFR SERPL CREATININE-BSD FRML MDRD: 5 ML/MIN/1.73SQ M
GLUCOSE SERPL-MCNC: 119 MG/DL (ref 65–140)
GLUCOSE SERPL-MCNC: 127 MG/DL (ref 65–140)
GLUCOSE SERPL-MCNC: 207 MG/DL (ref 65–140)
P AXIS: 81 DEGREES
POTASSIUM SERPL-SCNC: 4.3 MMOL/L (ref 3.5–5.3)
PR INTERVAL: 150 MS
QRS AXIS: 77 DEGREES
QRSD INTERVAL: 60 MS
QT INTERVAL: 370 MS
QTC INTERVAL: 474 MS
SODIUM SERPL-SCNC: 138 MMOL/L (ref 135–147)
T WAVE AXIS: 100 DEGREES
VENTRICULAR RATE: 99 BPM

## 2024-11-10 PROCEDURE — 82948 REAGENT STRIP/BLOOD GLUCOSE: CPT

## 2024-11-10 PROCEDURE — 99232 SBSQ HOSP IP/OBS MODERATE 35: CPT | Performed by: INTERNAL MEDICINE

## 2024-11-10 PROCEDURE — 93010 ELECTROCARDIOGRAM REPORT: CPT | Performed by: INTERNAL MEDICINE

## 2024-11-10 PROCEDURE — 99239 HOSP IP/OBS DSCHRG MGMT >30: CPT | Performed by: HOSPITALIST

## 2024-11-10 PROCEDURE — 80048 BASIC METABOLIC PNL TOTAL CA: CPT

## 2024-11-10 RX ORDER — CALCITRIOL 0.25 UG/1
0.75 CAPSULE, LIQUID FILLED ORAL 3 TIMES WEEKLY
Qty: 90 CAPSULE | Refills: 0 | Status: SHIPPED | OUTPATIENT
Start: 2024-11-11

## 2024-11-10 RX ORDER — HYDRALAZINE HYDROCHLORIDE 50 MG/1
50 TABLET, FILM COATED ORAL EVERY 8 HOURS SCHEDULED
Qty: 90 TABLET | Refills: 0 | Status: SHIPPED | OUTPATIENT
Start: 2024-11-10 | End: 2024-12-10

## 2024-11-10 RX ADMIN — LABETALOL HYDROCHLORIDE 400 MG: 200 TABLET, FILM COATED ORAL at 05:30

## 2024-11-10 RX ADMIN — ASPIRIN 81 MG 81 MG: 81 TABLET ORAL at 08:02

## 2024-11-10 RX ADMIN — HYDRALAZINE HYDROCHLORIDE 50 MG: 25 TABLET ORAL at 05:30

## 2024-11-10 RX ADMIN — FAMOTIDINE 10 MG: 20 TABLET ORAL at 08:02

## 2024-11-10 RX ADMIN — ESCITALOPRAM OXALATE 20 MG: 20 TABLET ORAL at 08:02

## 2024-11-10 RX ADMIN — GABAPENTIN 100 MG: 100 CAPSULE ORAL at 08:02

## 2024-11-10 RX ADMIN — HEPARIN SODIUM 5000 UNITS: 5000 INJECTION INTRAVENOUS; SUBCUTANEOUS at 05:30

## 2024-11-10 RX ADMIN — NIFEDIPINE 90 MG: 30 TABLET, FILM COATED, EXTENDED RELEASE ORAL at 08:02

## 2024-11-10 RX ADMIN — CINACALCET 120 MG: 30 TABLET ORAL at 08:02

## 2024-11-10 NOTE — ASSESSMENT & PLAN NOTE
[facility].  Access: Left arm AVF.  EDW: 90 kg. Challenged to 89.5 kg during last HD.   Electrolytes are stable.  Next scheduled HD is tomorrow, Monday.

## 2024-11-10 NOTE — ASSESSMENT & PLAN NOTE
Lab Results   Component Value Date    EGFR 5 11/10/2024    EGFR 7 11/09/2024    EGFR 5 11/08/2024    CREATININE 10.61 (H) 11/10/2024    CREATININE 7.88 (H) 11/09/2024    CREATININE 10.54 (H) 11/08/2024     Continue to monitor  Not on EPO at home

## 2024-11-10 NOTE — DISCHARGE SUMMARY
Discharge Summary - Hospitalist   Name: Mateus Mckeon 39 y.o. male I MRN: [id number]  Unit/Bed#: S -01 I Date of Admission: 11/5/2024   Date of Service: 11/10/2024 I Hospital Day: 5     Assessment & Plan  Hypertensive emergency  Patient evaluated in ED as stroke alert but found to be hypertensive around 220/105, persisted even after 10 mg hydralazine and 40 mg labetalol, nicardipine drip started in emergency department and patient admitted to the ICU.  -Initial presentation of worsening dizziness and found ot have BP up to 230s.  -Patient transferred to [facility] from ICU on 11/7.  BP Readings from Last 1 Encounters:   11/10/24 164/97        Plan:  Continue home antihypertensives: Labetalol 400 mg TID, Nifedipine 90 mg OD, Olmesartan 40 mg OD.   Hydralazine 50 mg 3 times daily p.o.-hold if SBP<150 per nephrology.  Cleared for discharge per nephrology.   Follow-up with nephrology as outpatient.  ESRD on hemodialysis ([facility])  Lab Results   Component Value Date    EGFR 5 11/10/2024    EGFR 7 11/09/2024    EGFR 5 11/08/2024    CREATININE 10.61 (H) 11/10/2024    CREATININE 7.88 (H) 11/09/2024    CREATININE 10.54 (H) 11/08/2024     Patient with Hemodialysis schedule MW    PLAN  -Continue with regular schedule  Type 1 diabetes mellitus on insulin therapy ([facility])  Lab Results   Component Value Date    HGBA1C 6.9 (H) 11/05/2024     Continue home insulin pump.  Anemia in ESRD (end-stage renal disease)  ([facility])  Lab Results   Component Value Date    EGFR 5 11/10/2024    EGFR 7 11/09/2024    EGFR 5 11/08/2024    CREATININE 10.61 (H) 11/10/2024    CREATININE 7.88 (H) 11/09/2024    CREATININE 10.54 (H) 11/08/2024     Continue to monitor  Not on EPO at home  Chronic kidney disease-mineral and bone disorder  Lab Results   Component Value Date    EGFR 5 11/10/2024    EGFR 7 11/09/2024    EGFR 5 11/08/2024    CREATININE 10.61 (H) 11/10/2024    CREATININE 7.88 (H) 11/09/2024    CREATININE 10.54 (H) 11/08/2024     Followed by  nephrology  Continue Sensipar 120 mg daily and calcitriol 0.75 mcg with HD for 2HPT.  He is on Velphoro for hyperphosphatemia.  Phos is at goal.   Myoclonic jerking  A single right posterior temporal sharp wave discharge suggests a possible source of seizures. Diffuse theta and delta activities suggest mild nonspecific diffuse cerebral dysfunction.   Per discussion with Neurology; no antiepileptic drugs planned.    PLAN  -Follow up in 6 weeks with epilepsy team.  No outpatient neurological testing.  Schedule by neurology scheduling team.  Word finding difficulty (Resolved: 11/10/2024)  Not present on examination in [facility] unit  Resolved at this time     Medical Problems       Resolved Problems  Date Reviewed: 10/31/2024            Resolved    Word finding difficulty 11/10/2024     Resolved by  Jose D Menendez MD        Discharging Physician / Practitioner: Jose D Menendez MD  PCP: Dania Sher DO  Admission Date:   Admission Orders (From admission, onward)       Ordered        11/05/24 1935  INPATIENT ADMISSION  Once                          Discharge Date: 11/10/24    Consultations During Hospital Stay:  Neurology, nephrology     Procedures Performed:   None    Significant Findings / Test Results:   MRI brain wo contrast   Final Result by Sudarshan Kim MD (11/06 1724)      1. No acute process seen. There is no acute infarct.   2. 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 There is no vascular lesion on recent CT angiogram. This is likely due to the patient's prior subarachnoid hemorrhage in January of this year.      Workstation performed: [id number]         XR chest portable ICU   Final Result by Holden Washburn MD (11/06 1335)      No acute cardiopulmonary disease.            Workstation performed: [id number]         XR abdomen 1 view kub   Final Result by Holden Washburn MD (11/06 1337)      No acute abnormality appreciated.               Workstation performed: [id number]         CT  stroke alert brain   Final Result by Justin Ho MD (11/05 1647)      No acute intracranial abnormality.      Chronic left posterior cerebellar, left corona radiata/centrum semiovale and bilateral basal ganglia lacunar infarcts. Chronic microangiopathic ischemic changes.            Workstation performed: [id number]         CTA stroke alert (head/neck)   Final Result by Justin Ho MD (11/05 1700)      1. CTA head: Negative for large vessel intracranial occlusion or hemodynamically significant stenosis.      2. CTA neck: Mild left extracranial ICA stenosis. The cervical vertebral arteries are patent.            Findings were communicated to Dr. Bryan Justice at 4:54 p.m.      Workstation performed: [id number]               Incidental Findings:   None    Test Results Pending at Discharge (will require follow up):   None     Outpatient Tests Requested:  None    Complications:  Word finding difficulty.     Reason for Admission: Strokelike symptoms    Hospital Course:   Mateus Mckeno is a 39- year-old male with multiple past CVAs, SAH, DM 1, ESRD MTHFR gene, and hypertension presented to the emergency department with dizziness and confusion that began when he woke up at approximately 8 in the morning.  Patient presented to ED in the afternoon after symptoms were not getting better and he believes he felt similar to previous stroke.  Patient was worked up under the stroke pathway and CT/MRI were negative for any acute strokes. Patient did have significantly elevated blood pressure in the ER with systolics between 210 and 230. Patient did not respond to labetalol or hydralazine so was started on a Cardene drip and sent to the ICU.  Patient's blood pressure responded well to Cardene drip with a goal between 180 and 160 systolic.  During his hospital stay, he also had a 1 episode of word finding difficulty in which EEG was performed to rule out seizure.  Per neurology, current no AED  "recommendation.  Patient was transferred to [facility] floor 11/7 and he did have a.m. hypertension with SBP>190s.  Per nephrology, he was then started on hydralazine 50 mg 3 times daily with holding parameter < 150.  Patient was cleared by nephrology and he is medically stable for discharge today and follow-up with nephrology as outpatient.    Please see above list of diagnoses and related plan for additional information.     Condition at Discharge: good    Discharge Day Visit / Exam:   Subjective: Patient was seen and examined at bedside.  OOA x 4, NAD.  No overnight event.  No complaints.  Vitals: Blood Pressure: 164/97 (11/10/24 1134)  Pulse: 65 (11/10/24 1134)  Temperature: 97.7 °F (36.5 °C) (11/10/24 0751)  Temp Source: Oral (11/07/24 1525)  Respirations: 14 (11/08/24 1250)  Height: 5' 11\" (180.3 cm) (11/06/24 0700)  Weight - Scale: 90.7 kg (199 lb 15.3 oz) (11/10/24 0424)  SpO2: 97 % (11/10/24 1134)  Physical Exam  Vitals and nursing note reviewed.   Constitutional:       General: He is not in acute distress.     Appearance: He is well-developed.   HENT:      Head: Normocephalic and atraumatic.   Eyes:      Conjunctiva/sclera: Conjunctivae normal.   Cardiovascular:      Rate and Rhythm: Normal rate and regular rhythm.      Heart sounds: No murmur heard.  Pulmonary:      Effort: Pulmonary effort is normal. No respiratory distress.      Breath sounds: Normal breath sounds. No wheezing, rhonchi or rales.   Abdominal:      General: There is no distension.      Palpations: Abdomen is soft.      Tenderness: There is no abdominal tenderness. There is no guarding or rebound.   Musculoskeletal:         General: No swelling. Normal range of motion.      Cervical back: Neck supple.   Skin:     General: Skin is warm and dry.      Capillary Refill: Capillary refill takes less than 2 seconds.   Neurological:      Mental Status: He is alert and oriented to person, place, and time.   Psychiatric:         Mood and Affect: Mood " normal.          Discussion with Family: Updated  (wife) via phone.    Discharge instructions/Information to patient and family:   See after visit summary for information provided to patient and family.      Provisions for Follow-Up Care:  See after visit summary for information related to follow-up care and any pertinent home health orders.      Mobility at time of Discharge:   Basic Mobility Inpatient Raw Score: 20  JH-HLM Goal: 6: Walk 10 steps or more  JH-HLM Achieved: 6: Walk 10 steps or more  HLM Goal achieved. Continue to encourage appropriate mobility.     Disposition:   Home    Planned Readmission: None    Discharge Medications:  See after visit summary for reconciled discharge medications provided to patient and/or family.      Administrative Statements   Discharge Statement:  I have spent a total time of 40 minutes in caring for this patient on the day of the visit/encounter. >30 minutes of time was spent on: Diagnostic results, Prognosis, Risks and benefits of tx options, Instructions for management, Patient and family education, Importance of tx compliance, Risk factor reductions, Impressions, Counseling / Coordination of care, Documenting in the medical record, Reviewing / ordering tests, medicine, procedures  , and Communicating with other healthcare professionals .    **Please Note: This note may have been constructed using a voice recognition system**

## 2024-11-10 NOTE — ASSESSMENT & PLAN NOTE
Noted to have accelerated hypertension on presentation.  Home medications per MAR: Labetalol 400 mg TID, Nifedipine 90 mg OD, Olmesartan 40 mg OD - confirmed with mother.   S/p Cardene drip.   BP remains high but improving slowly.   Current Rx: Labetalol 400 mg TID, Nifedipine 90 mg OD, Losartan 100 mg OD, Hydralazine 50 mg TID.   Continue current medications.  Advised to hold hydralazine for SBP < 150 mm Hg.

## 2024-11-10 NOTE — DISCHARGE INSTR - AVS FIRST PAGE
Dear Mateus Mckeon,     It was our pleasure to care for you here at [facility].  It is our hope that we were always able to exceed the expected standards for your care during your stay.  You were hospitalized due to hypertensive emergency.  You were cared for on the South third Floor by Jose D Menendez MD under the service of Evelyn Mckeon MD with the [facility] who covers for your primary care physician (PCP), Dania Sher DO, while you were hospitalized.  If you have any questions or concerns related to this hospitalization, you may contact us at .  For follow up as well as any medication refills, we recommend that you follow up with your primary care physician.  A registered nurse will reach out to you by phone within a few days after your discharge to answer any additional questions that you may have after going home.  However, at this time we provide for you here, the most important instructions / recommendations at discharge:     Notable Medication Adjustments -   Please START to take hydralazine 50 mg 3 times daily if your systolic blood pressure above 150 as prescribed.  Please continue to take all of your other medications as prescribed.  Testing Required after Discharge -   None  ** Please contact your PCP to request testing orders for any of the testing recommended here **  Important follow up information -   Please follow-up with your PCP within 1 week of discharge.  Please follow-up with your nephrologist within 1 week of discharge.  Other Instructions -   Please get and stay well.  It was a pleasure to take care of you in the hospital.  Please review this entire after visit summary as additional general instructions including medication list, appointments, activity, diet, any pertinent wound care, and other additional recommendations from your care team that may be provided for you.      Sincerely,     Jose D Menendez MD

## 2024-11-10 NOTE — ASSESSMENT & PLAN NOTE
Continue Sensipar 120 mg daily and calcitriol 0.75 mcg with HD for 2HPT.  He is on Velphoro for hyperphosphatemia.  No changes.   Continue renal diet.

## 2024-11-10 NOTE — ASSESSMENT & PLAN NOTE
Lab Results   Component Value Date    HGBA1C 6.9 (H) 11/05/2024     Continue home insulin pump.   V-Y Plasty Text: The defect edges were debeveled with a #15 scalpel blade.  Given the location of the defect, shape of the defect and the proximity to free margins an V-Y advancement flap was deemed most appropriate.  Using a sterile surgical marker, an appropriate advancement flap was drawn incorporating the defect and placing the expected incisions within the relaxed skin tension lines where possible.    The area thus outlined was incised deep to adipose tissue with a #15 scalpel blade.  The skin margins were undermined to an appropriate distance in all directions utilizing iris scissors.

## 2024-11-10 NOTE — PLAN OF CARE
Problem: METABOLIC, FLUID AND ELECTROLYTES - ADULT  Goal: Electrolytes maintained within normal limits  Description: INTERVENTIONS:  - Monitor labs and assess patient for signs and symptoms of electrolyte imbalances  - Administer electrolyte replacement as ordered  - Monitor response to electrolyte replacements, including repeat lab results as appropriate  - Instruct patient on fluid and nutrition as appropriate  Outcome: Progressing  Goal: Fluid balance maintained  Description: INTERVENTIONS:  - Monitor labs   - Monitor I/O and WT  - Instruct patient on fluid and nutrition as appropriate  - Assess for signs & symptoms of volume excess or deficit  Outcome: Progressing     Problem: Nutrition/Hydration-ADULT  Goal: Nutrient/Hydration intake appropriate for improving, restoring or maintaining nutritional needs  Description: Monitor and assess patient's nutrition/hydration status for malnutrition. Collaborate with interdisciplinary team and initiate plan and interventions as ordered.  Monitor patient's weight and dietary intake as ordered or per policy. Utilize nutrition screening tool and intervene as necessary. Determine patient's food preferences and provide high-protein, high-caloric foods as appropriate.     INTERVENTIONS:  - Monitor oral intake, urinary output, labs, and treatment plans  - Assess nutrition and hydration status and recommend course of action  - Evaluate amount of meals eaten  - Assist patient with eating if necessary   - Allow adequate time for meals  - Recommend/ encourage appropriate diets, oral nutritional supplements, and vitamin/mineral supplements  - Order, calculate, and assess calorie counts as needed  - Recommend, monitor, and adjust tube feedings and TPN/PPN based on assessed needs  - Assess need for intravenous fluids  - Provide specific nutrition/hydration education as appropriate  - Include patient/family/caregiver in decisions related to nutrition  Outcome: Progressing     Problem:  PAIN - ADULT  Goal: Verbalizes/displays adequate comfort level or baseline comfort level  Description: Interventions:  - Encourage patient to monitor pain and request assistance  - Assess pain using appropriate pain scale  - Administer analgesics based on type and severity of pain and evaluate response  - Implement non-pharmacological measures as appropriate and evaluate response  - Consider cultural and social influences on pain and pain management  - Notify physician/advanced practitioner if interventions unsuccessful or patient reports new pain  Outcome: Progressing     Problem: INFECTION - ADULT  Goal: Absence or prevention of progression during hospitalization  Description: INTERVENTIONS:  - Assess and monitor for signs and symptoms of infection  - Monitor lab/diagnostic results  - Monitor all insertion sites, i.e. indwelling lines, tubes, and drains  - Monitor endotracheal if appropriate and nasal secretions for changes in amount and color  - [place] appropriate cooling/warming therapies per order  - Administer medications as ordered  - Instruct and encourage patient and family to use good hand hygiene technique  - Identify and instruct in appropriate isolation precautions for identified infection/condition  Outcome: Progressing  Goal: Absence of fever/infection during neutropenic period  Description: INTERVENTIONS:  - Monitor WBC    Outcome: Progressing     Problem: SAFETY ADULT  Goal: Patient will remain free of falls  Description: INTERVENTIONS:  - Educate patient/family on patient safety including physical limitations  - Instruct patient to call for assistance with activity   - Consult OT/PT to assist with strengthening/mobility   - Keep Call bell within reach  - Keep bed low and locked with side rails adjusted as appropriate  - Keep care items and personal belongings within reach  - Initiate and maintain comfort rounds  - Make Fall Risk Sign visible to staff  - Offer Toileting every 2 Hours, in advance  of need  - Apply yellow socks and bracelet for high fall risk patients  - Consider moving patient to room near nurses station  Outcome: Progressing  Goal: Maintain or return to baseline ADL function  Description: INTERVENTIONS:  -  Assess patient's ability to carry out ADLs; assess patient's baseline for ADL function and identify physical deficits which impact ability to perform ADLs (bathing, care of mouth/teeth, toileting, grooming, dressing, etc.)  - Assess/evaluate cause of self-care deficits   - Assess range of motion  - Assess patient's mobility; develop plan if impaired  - Assess patient's need for assistive devices and provide as appropriate  - Encourage maximum independence but intervene and supervise when necessary  - Involve family in performance of ADLs  - Assess for home care needs following discharge   - Consider OT consult to assist with ADL evaluation and planning for discharge  - Provide patient education as appropriate  Outcome: Progressing  Goal: Maintains/Returns to pre admission functional level  Description: INTERVENTIONS:  - Perform AM-PAC 6 Click Basic Mobility/ Daily Activity assessment daily.  - Set and communicate daily mobility goal to care team and patient/family/caregiver.   - Collaborate with rehabilitation services on mobility goals if consulted  - Perform Range of Motion 3 times a day.  - Reposition patient every 2 hours.  - Dangle patient 3 times a day  - Stand patient 3 times a day  - Ambulate patient 3 times a day  - Out of bed to chair 3 times a day   - Out of bed for meals 3 times a day  - Out of bed for toileting  - Record patient progress and toleration of activity level   Outcome: Progressing     Problem: Knowledge Deficit  Goal: Patient/family/caregiver demonstrates understanding of disease process, treatment plan, medications, and discharge instructions  Description: Complete learning assessment and assess knowledge base.  Interventions:  - Provide teaching at level of  understanding  - Provide teaching via preferred learning methods  Outcome: Progressing

## 2024-11-10 NOTE — PROGRESS NOTES
NEPHROLOGY HOSPITAL PROGRESS NOTE   Mateus Mckeon 39 y.o. male MRN: [id number]  Unit/Bed#: S -01 Encounter: [id number]  Reason for Consult: ESRD on HD    Brief History of Admission - 40 yo with HTN, DM, HLP, ESRD on HD, CVA presenting to [facility] on November 5, 2024 with dizziness and was found to have accelerated hypertension on presentation.  Renal consulted for ESRD on HD.  Assessment & Plan  ESRD on hemodialysis ([facility])  [facility].  Access: Left arm AVF.  EDW: 90 kg. Challenged to 89.5 kg during last HD.   Electrolytes are stable.  Next scheduled HD is tomorrow, Monday.    Hypertensive emergency  Noted to have accelerated hypertension on presentation.  Home medications per MAR: Labetalol 400 mg TID, Nifedipine 90 mg OD, Olmesartan 40 mg OD - confirmed with mother.   S/p Cardene drip.   BP remains high but improving slowly.   Current Rx: Labetalol 400 mg TID, Nifedipine 90 mg OD, Losartan 100 mg OD, Hydralazine 50 mg TID.   Continue current medications.  Advised to hold hydralazine for SBP < 150 mm Hg.     Anemia in ESRD (end-stage renal disease)  ([facility])  Hgb at goal. 11.0 yesterday.   Not on HETAL at this time.     Chronic kidney disease-mineral and bone disorder  Continue Sensipar 120 mg daily and calcitriol 0.75 mcg with HD for 2HPT.  He is on Velphoro for hyperphosphatemia.  No changes.   Continue renal diet.     Type 1 diabetes mellitus on insulin therapy ([facility])  Defer to primary team.    PLAN SUMMARY:  Next scheduled HD is tomorrow.  Stable for discharge from renal standpoint.  He should continue on his previous BP medications which are Labetalol 400 mg TID, Nifedipine 90 mg OD, Olmesartan 40 mg OD.   Continue with Hydralazine 50 mg TID - hold for SBP < 150 mm Hg.     SUBJECTIVE / 24H INTERVAL HISTORY:  Denies any new acute complaints.   No CP or SOB.     OBJECTIVE:  Current Weight: Weight - Scale: 90.7 kg (199 lb 15.3 oz)  Vitals:    11/10/24 0424 11/10/24 0524 11/10/24 0751  11/10/24 0907   BP:  169/100 (!) 192/109 (!) 173/88   BP Location:       Pulse:  79 75 67   Resp:       Temp:   97.7 °F (36.5 °C)    TempSrc:       SpO2:  90% 98% (!) 89%   Weight: 90.7 kg (199 lb 15.3 oz)      Height:         No intake or output data in the 24 hours ending 11/10/24 1117    General: conscious, cooperative, no distress  Skin: dry  Eyes: pink conjunctivae  ENT: moist mucous membranes  Respiratory: equal chest expansion, clear breath sounds.  Cardiovascular: distinct heart sounds, normal rate, regular rhythm, no rub  Abdomen: soft, non tender, non distended, normal bowel sounds  Extremities: no edema.   Genitourinary: no preston catheter.   Neuro: awake, alert.   Psych: appropriate affect    Medications:    Current Facility-Administered Medications:     aspirin chewable tablet 81 mg, 81 mg, Oral, Daily, Chaka Olson, DO, 81 mg at 11/10/24 0802    atorvastatin (LIPITOR) tablet 40 mg, 40 mg, Oral, HS, Chaka Olson, DO, 40 mg at 11/09/24 2117    calcitriol (ROCALTROL) capsule 0.75 mcg, 0.75 mcg, Oral, Once per day on Monday Wednesday Friday, Chaka Olson, DO, 0.75 mcg at 11/08/24 0809    cinacalcet (SENSIPAR) tablet 120 mg, 120 mg, Oral, Daily With Breakfast, Chaka Olson, DO, 120 mg at 11/10/24 0802    escitalopram (LEXAPRO) tablet 20 mg, 20 mg, Oral, Daily, Chaka Olson, DO, 20 mg at 11/10/24 0802    famotidine (PEPCID) tablet 10 mg, 10 mg, Oral, Daily, Chaka Olson, DO, 10 mg at 11/10/24 0802    gabapentin (NEURONTIN) capsule 100 mg, 100 mg, Oral, Daily, 100 mg at 11/10/24 0802 **AND** gabapentin (NEURONTIN) capsule 200 mg, 200 mg, Oral, HS, Chaka Olson, DO, 200 mg at 11/09/24 2117    heparin (porcine) subcutaneous injection 5,000 Units, 5,000 Units, Subcutaneous, Q8H HALIE, 5,000 Units at 11/10/24 0530 **AND** [COMPLETED] Platelet count, , , Once, Aime Gee MD    hydrALAZINE (APRESOLINE) injection 10 mg, 10 mg, Intravenous, Q6H PRN, Aime Brown MD, 10 mg at 11/08/24 0928    hydrALAZINE (APRESOLINE)  "tablet 50 mg, 50 mg, Oral, Q8H HALIE, Aime Brown MD, 50 mg at 11/10/24 0530    insulin aspart (NovoLOG) FOR PUMP REFILLS 120 Units, 120 Units, Subcutaneous Insulin Pump, Daily PRN, Chaka Olson DO, 120 Units at 11/09/24 1744    labetalol (NORMODYNE) tablet 400 mg, 400 mg, Oral, Q8H HALIE, Chaka Olson DO, 400 mg at 11/10/24 0530    losartan (COZAAR) tablet 100 mg, 100 mg, Oral, HS, Hiram Welsh MD, 100 mg at 11/09/24 2118    NIFEdipine (PROCARDIA XL) 24 hr tablet 90 mg, 90 mg, Oral, Daily, Chaka Olson DO, 90 mg at 11/10/24 0802    ondansetron (ZOFRAN) injection 4 mg, 4 mg, Intravenous, Q4H PRN, Chaka Olson DO, 4 mg at 11/09/24 0750    PATIENT MAINTAINED INSULIN PUMP 1 each, 1 each, Subcutaneous, Q8H, Chaka Olson DO, 1 each at 11/10/24 0531    Laboratory Results:  Results from last 7 days   Lab Units 11/10/24  0526 11/09/24  0437 11/08/24  0913 11/07/24  0538 11/06/24  1214 11/06/24  0452 11/05/24  2034 11/05/24  1606   WBC Thousand/uL  --  4.89 4.11* 4.18* 6.45 5.16  --  5.71   HEMOGLOBIN g/dL  --  11.0* 10.6* 12.2 13.0 11.0*  --  12.0   HEMATOCRIT %  --  33.6* 31.5* 37.2 37.8 32.9*  --  35.9*   PLATELETS Thousands/uL  --  191 181 197 228 179 185 206   POTASSIUM mmol/L 4.3 4.5 4.4 4.1 4.3 3.8  --  4.5   CHLORIDE mmol/L 98 98 96 97 96 97  --  100   CO2 mmol/L 29 26 29 31 28 31  --  35*   BUN mg/dL 30* 21 31* 21 10 27*  --  25   CREATININE mg/dL 10.61* 7.88* 10.54* 7.91* 4.23* 9.75*  --  8.71*   CALCIUM mg/dL 8.2* 8.3* 8.7 9.1 9.0 8.4  --  8.1*   MAGNESIUM mg/dL  --   --  2.5 2.3 2.0 2.4 2.5  --    PHOSPHORUS mg/dL  --  4.2 4.8*  --  1.4* 4.0 3.6  --      Portions of the record may have been created with voice recognition software. Occasional wrong word or \"sound a like\" substitutions may have occurred due to the inherent limitations of voice recognition software. Read the chart carefully and recognize, using context, where substitutions have occurred.If you have any questions, please contact the " dictating provider.

## 2024-11-10 NOTE — ASSESSMENT & PLAN NOTE
Lab Results   Component Value Date    EGFR 5 11/10/2024    EGFR 7 11/09/2024    EGFR 5 11/08/2024    CREATININE 10.61 (H) 11/10/2024    CREATININE 7.88 (H) 11/09/2024    CREATININE 10.54 (H) 11/08/2024     Followed by nephrology  Continue Sensipar 120 mg daily and calcitriol 0.75 mcg with HD for 2HPT.  He is on Velphoro for hyperphosphatemia.  Phos is at goal.

## 2024-11-10 NOTE — ASSESSMENT & PLAN NOTE
Lab Results   Component Value Date    EGFR 5 11/10/2024    EGFR 7 11/09/2024    EGFR 5 11/08/2024    CREATININE 10.61 (H) 11/10/2024    CREATININE 7.88 (H) 11/09/2024    CREATININE 10.54 (H) 11/08/2024     Patient with Hemodialysis schedule MWF    PLAN  -Continue with regular schedule

## 2024-11-10 NOTE — ASSESSMENT & PLAN NOTE
Patient evaluated in ED as stroke alert but found to be hypertensive around 220/105, persisted even after 10 mg hydralazine and 40 mg labetalol, nicardipine drip started in emergency department and patient admitted to the ICU.  -Initial presentation of worsening dizziness and found ot have BP up to 230s.  -Patient transferred to [facility] from ICU on 11/7.  BP Readings from Last 1 Encounters:   11/10/24 164/97        Plan:  Continue home antihypertensives: Labetalol 400 mg TID, Nifedipine 90 mg OD, Olmesartan 40 mg OD.   Hydralazine 50 mg 3 times daily p.o.-hold if SBP<150 per nephrology.  Cleared for discharge per nephrology.   Follow-up with nephrology as outpatient.

## 2024-11-11 ENCOUNTER — TRANSITIONAL CARE MANAGEMENT (OUTPATIENT)
Age: 39
End: 2024-11-11

## 2024-11-11 NOTE — UTILIZATION REVIEW
NOTIFICATION OF ADMISSION DISCHARGE   This is a Notification of Discharge from [facility]. Please be advised that this patient has been discharge from our facility. Below you will find the admission and discharge date and time including the patient’s disposition.   UTILIZATION REVIEW CONTACT:  Graciela Casillas  Utilization   Network Utilization Review Department  Phone: [phone] x carefully listen to the prompts. All voicemails are confidential.  Email: [email or web address]     ADMISSION INFORMATION  PRESENTATION DATE: 11/5/2024  3:48 PM  OBERVATION ADMISSION DATE: N/A  INPATIENT ADMISSION DATE: 11/5/24  7:35 PM   DISCHARGE DATE: 11/10/2024  1:16 PM   DISPOSITION:Home/Self Care    Network Utilization Review Department  ATTENTION: Please call with any questions or concerns to [phone] and carefully listen to the prompts so that you are directed to the right person. All voicemails are confidential.   For Discharge needs, contact Care Management DC Support Team at [phone] opt. [phone]  Send all requests for admission clinical reviews, approved or denied determinations and any other requests to dedicated fax number below belonging to the campus where the patient is receiving treatment. List of dedicated fax numbers for the Facilities:  FACILITY NAME UR FAX NUMBER   ADMISSION DENIALS (Administrative/Medical Necessity) [phone]   DISCHARGE SUPPORT TEAM ([facility]) [phone]   PARENT CHILD HEALTH (Maternity/NICU/Pediatrics) [phone]   [facility] [phone]   [facility] [phone]   [facility] [phone]   [facility] [phone]   [facility] [phone]   [facility] [phone]   [facility] [phone]   [facility] [phone]    [facility] [phone]   [facility] [phone]   [facility] [phone]   [facility] [phone]

## 2024-11-12 ENCOUNTER — EVALUATION (OUTPATIENT)
Facility: CLINIC | Age: 39
End: 2024-11-12
Payer: MEDICARE

## 2024-11-12 ENCOUNTER — TELEPHONE (OUTPATIENT)
Age: 39
End: 2024-11-12

## 2024-11-12 DIAGNOSIS — I63.89 CEREBROVASCULAR ACCIDENT (CVA) DUE TO OTHER MECHANISM (HCC): Primary | ICD-10-CM

## 2024-11-12 PROCEDURE — 97530 THERAPEUTIC ACTIVITIES: CPT

## 2024-11-12 NOTE — TELEPHONE ENCOUNTER
Forwarding to PA team for review.   Rocaltrol 0.25 mcg capsule    Pharmacy called and states that the ED prescribed this and they do not do prior auths and wanted to see if this is something that could be done through pcp. Please call pharmacy and patient is this is something that can not be done.

## 2024-11-12 NOTE — PROGRESS NOTES
"OCCUPATIONAL THERAPY RE-EVALUATION    Today's Date: 2024  Patient Name: Mateus Mckeon  : 1985  MRN: [id number]  Referring Provider: Dania Sher,   Dx: Cerebrovascular accident (CVA) due to other mechanism (HCC) [I63.89]      SKILLED ANALYSIS:  Pt presents to re-evaluation on 24 s/p history of CVA and recent hospitalization due to a hypertensive episode. Based on review of the EMR, there was no acute infarct, he is scheduled to follow up with neurology. Pt overall states he feels weaker since his hospitalization, however no other significant changes. Pt was UE function was evaluated thru ROM, MMT, FMA-UE, dynamometer, pinch gauge, and 9-hole peg test. Based on assessments, pt is demonstrating slight decline with R UE strength and function based on MMT and FMA-UE. Pt maintained with his  strength. Decline with FMC based on 9-hole peg test. Pt continues to function below normal limits in the following domains: R UE strength, endurance, FMC R>L, dexterity R>L, R grasp strength, higher level functional cognition.  Slight decline with some goals, likely due to recent hospitalization - new goal set for R UE strength as well today. Recommend continued participation in OP OT 2x/wk for an additional 8-12 weeks with continued focus on aforementioned deficits to maximize functional performance and QOL.  Discussed progress and recommendations with pt and he is in agreement.    Subjective:   : Pt states he feels like his endurance is not where it needs to be. He has been attempting to use his hand as much as he can, however does not see much progress - if any thing he states \"I feel like I'm losing ground\". Pt states he did start using the theraputty and continued using foam to work on hand strength. Pt states he is not able to keep up with her exercises on days that he has dialysis due to fatigue.     : Reports inc endurance during daily activities, but still fatigues quicker than " "baseline.  Able to complete zippers and buttons with inc time, requires assist for tying shoes.  Now able to write his initials and complete basic handwriting with ~80% legibility, significantly inc time and inc sizing    8/21: Pt states he is progressing, he feels like the use of his R hand has come a long way. Pt states he is better able to use his R hand, and is now able to tie his shoes.     9/25: Pt reports he feels like he is continuing to improve, however his R hand is still not where it was. Pt states he has been wearing his night resting hand splint less as he feels like his R hand is not as tight.     11/12: Pt states on 11/5 he sent to the ED as his BP was high and he was having difficulty forming sentences. As per EMR review, there was no acute infarct.     PLAN OF CARE START: 9/25/24  PLAN OF CARE END: 12/25/2024  FREQUENCY: 2x/wk  PRECAUTIONS: Renal failure, actively going through dialysis; type 1 diabetes; HTN; SAH; seizure (last one was 2019)    Subjective    Mechanism of Injury  11/12/24: \"Hypertensive emergency: Patient presented with blood pressure up to 200s, with word finding difficulty, with his history of CVAs, was admitted to ICU with Cardene drip and admitted to rule out stroke. Was treated with loading dose of aspirin and Plavix and maintained on daily dosing. Subsequently underwent CT head CTA which were negative. Underwent MRI brain which was negative for acute infarct. Hence Plavix can be discontinued at this time. Suspect all his symptoms which are secondary to high blood pressure temporarily affecting cerebral perfusion. He was weaned off Cardene drip and maintained on oral home medications yesterday in ICU and then downgraded as blood pressures were better controlled. According to the mother he gets symptomatic with SBP is below 120. Has BP of 140 is a good medium for him. It is unclear what acutely changes blood pressure hence his blood pressure was well-controlled for long period of " "time with the home regimen of medications. Only thing new was calcium and zinc supplementation because he was recently diagnosed with COVID 3 weeks ago\"    Paulo is a 38-year-old male with history of end-stage renal disease on dialysis, hypertension, type 1 diabetes, and recent subarachnoid hemorrhage   Pt reports he went to the hospital and they found a brain bleed and reported he needed an angioplasty. Unfortunately, during the angioplasty he suffered a stroke that affected his R UE. Since his stroke he reports his R UE function has improved although he continues with R hand FMC/dexterity deficits.   Overall, pt requires assistance with 75% of his daily activities due to complex medical presentation as well as increased fatigue post-stroke.   \"There's nothing more like purgatory than using your non dominant side.\"    From Hospital note on 1/14/24:   38-year-old man with prior history of cerebellar and pontine strokes, prothrombin gene mutation, MTHFR gene mutation, diabetes, diabetic nephropathy with ESRD on HD, who presented with headache on 1/5 and was found to have subarachnoid hemorrhage in the basilar cisterns.  Unclear etiology.  Patient family reports nausea and retching around the time of headache.     - Underwent conventional angiogram later that day which was unremarkable for any clear source.  - MRI brain on 1/8 demonstrated multiple punctate foci of ischemia in the right MCA, bilateral cerebellar hemispheres, and right velia which may represent sequela of angiograph versus embolic strokes of unclear source (ESUS)   -Repeat angiogram on 1/12 was again unremarkable for source of subarachnoid.     Neurology consulted for right upper extremity weakness noted after second angiogram.  - Repeat MRI demonstrated numerous new embolic appearing foci of ischemia with similar suspected etiology as those mentioned above.     Transcranial Dopplers have been unremarkable with Lindegaard ratios consistently less than " "3.     Spontaneous basilar cistern subarachnoid hemorrhage of yet unclear etiology.      Embolic appearing strokes, suspect complication of angiogram however cannot exclude additional embolic source.    Occupational Profile  Pt lives in a home with his parents; has one flight to bedroom and has AD throughout the house (grab bars etc.). Paulo reports he needs help with preparing meals (could get by making 1 meal for himself but not all day), laundry, and driving. He is independent with dressing and bathing, \"but it takes it out of me. I could do some things but I need help throughout the day.\"    It is of note that the pt is actively getting dialysis; has to sit still for 4 hours  Pt reports some numbness in fingers on left side (L sided forearm is where dialysis is put in) and notes difficulty with thinking and memory since stroke. No changes in vision     He enjoys spending time with nieces and nephews and would love to get a dog.    PATIENT GOAL: \"I want to be able to tie my shoes without having to ask my parents\"    HISTORY OF PRESENT ILLNESS:     PMH:   Past Medical History:   Diagnosis Date    Acute kidney injury (HCC)     Ambulates with cane     Anuria     Anxiety     Cellulitis of right elbow 03/31/2021    Chronic kidney disease     Depression     Diabetes mellitus (HCC)     Diarrhea     Emesis 10/24/2020    End stage renal disease (HCC) 02/11/2018    Formatting of this note might be different from the original. Last Assessment & Plan:  Secondary to DM.  On nightly PD.  Followed by Nephro.  [facility] Formatting of this note might be different from the original. Last Assessment & Plan:  Formatting of this note might be different from the original. 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    Esophagitis 07/21/2015    Falls     Gastroparesis     GERD (gastroesophageal reflux disease)     History of shingles 2010    " History of transfusion 02/2018    no adverse reaction    Hyperlipidemia     Hyperphosphatemia     Hypertension     Hypoglycemia 07/15/2022    Itching     Mastoiditis of right side 07/15/2022    Muscle weakness     general unsteadiness    Obesity (BMI 30.0-34.9) 09/09/2019    Orthostatic hypotension 10/25/2020    Peripheral polyneuropathy 11/20/2019    PONV (postoperative nausea and vomiting) 01/26/2018    Protein-calorie malnutrition (HCC) 11/23/2020    Recurrent peritonitis (HCC) due to peritoneal dialysis catheter 07/31/2020    Retinopathy     Seizures (HCC)     early 2020 - one time    Skin abnormality     some dime size areas where skin was scratched from itching    Sleep apnea     Spontaneous bacterial peritonitis (HCC) 10/19/2020    Squamous cell skin cancer     left temple    Stroke (HCC)     x2 - off balance/no driving/fatigue    Swelling of both lower extremities     Traumatic onycholysis 07/21/2022    Vomiting     Wears glasses     Word finding difficulty 11/05/2024       Past Surgical Hx:   Past Surgical History:   Procedure Laterality Date    CARDIAC ELECTROPHYSIOLOGY PROCEDURE N/A 9/21/2023    Procedure: Cardiac loop recorder explant;  Surgeon: Parish Morgan MD;  [facility]: [facility];  Service: Cardiology    CARDIAC LOOP RECORDER  05/2018    COLONOSCOPY      EGD      EYE SURGERY Right     HEMODIALYSIS ADULT  11/6/2024    IR AV FISTULAGRAM/GRAFTOGRAM  02/23/2021    IR CEREBRAL ANGIOGRAPHY  1/12/2024    IR CEREBRAL ANGIOGRAPHY / INTERVENTION  1/5/2024    IR TUNNELED CENTRAL LINE PLACEMENT  02/16/2021    IR TUNNELED DIALYSIS CATHETER PLACEMENT  11/18/2020    IR TUNNELED DIALYSIS CATHETER REMOVAL  02/12/2021    IR TUNNELED DIALYSIS CATHETER REMOVAL  03/11/2021    MOHS SURGERY Left 12/14/2022    Left temple with Dr. Hassan    PERITONEAL CATHETER INSERTION N/A 08/27/2018    Procedure: UNROOF PD CATHETER;  Surgeon: Felipe Lindo DO;  Location: [facility];  Service: General    NH ARTERIOVENOUS  ANASTOMOSIS OPEN DIRECT Left 11/09/2020    Procedure: CREATION FISTULA  ARTERIOVENOUS (AV) - LEFT WRIST;  Surgeon: Placido Altamirano MD;  Location: [facility];  Service: Vascular    CO ESOPHAGOGASTRODUODENOSCOPY TRANSORAL DIAGNOSTIC N/A 04/18/2019    Procedure: ESOPHAGOGASTRODUODENOSCOPY (EGD);  Surgeon: Ale Figueroa MD;  Location: [facility];  Service: Gastroenterology    CO LAPS INSERTION TUNNELED INTRAPERITONEAL CATHETER N/A 08/06/2018    Procedure: LAPAROSCOPIC PD CATHETER PLACEMENT;  Surgeon: Felipe Lindo DO;  Location[facility];  Service: General    CO REMOVAL TUNNELED INTRAPERITONEAL CATHETER N/A 11/18/2020    Procedure: REMOVAL CATHETER PERITONEAL DIALYSIS;  Surgeon: Abdifatah Ty MD;  [facility];  Service: General    TONSILLECTOMY      UPPER GASTROINTESTINAL ENDOSCOPY          Pain: FLACC 0     Objective    Upper Extremities:  Pt is right hand dominant    Range of Motion:  AROM:   R UE   - Shoulder flexion: 160* - WFL  -Shoulder abduction: 180* - WNL  - Shoulder extension: WNL  - Elbow flexion WNL  - Elbow extension: WNL  - Wrist flexion: WNL  - Wrist extension: WNL  - Pronation: WNL  - Supination: 95% AROM  - Finger extension: WNL  - Finger flexion: WNL    L UE : 100%     Manual Muscle Testing:  R UE:  - Shoulder flexors: 5/5  - Shoulder extensors: 5/5  - Shoulder abductors: 4+/5   - Shoulder external rotators: 4/5   - Shoulder internal rotators: 5/5  - Elbow flexors: 5/5  - Elbow extensors: 4/5  - Wrist flexors: 4+/5  - Wrist extensors: 4+/5  L UE:  - Shoulder flexors: 5/5  - Shoulder extensors: 5/5  - Shoulder abductors: 5/5   - Shoulder external rotators: 5/5   - Shoulder internal rotators: 5/5  - Elbow flexors: 5/5  - Elbow extensors: 5/5  - Wrist flexors: 5/5  - Wrist extensors: 5/5                  ANGEL: RUE: 65lb LUE: 100lb  The age norm is approximately R: 116.8;bs and L: 112.8 lbs, indicating decreased  strength..                 2 point pinch: RUE: 7lb , LUE: 10.5lb  (age norms  are 18 lbs)   3 point pinch: RUE: 9lb , LUE: 20lb  (age norms are 24 lbs)   Lateral pinch: RUE: 16lb , LUE: 21lb  (age norms are 25 lbs)                   NINE-HOLE PEG TEST: assesses dexterity/fine motor coordination   R hand: 60 seconds   L hand: 47 seconds   Pt demonstrates decreased FMC compared to norms for age/sex (L: 18.62 seconds and R: 17.71 seconds)     Functional Dexterity Test: assesses patient's ability to use the hand for daily tasks requiring a 3-jaw federico prehension between the fingers and the thumb. Completed in a zig-zag pattern with unaffected UE first.  not re-assessed  5-second penalty (each):  -Supinating forearm to assist  -Touching the board for assist    10-second penalty:  -Dropping a peg    R UE: 2:49 with 2 drops = 189 seconds (Prior: 3:49 with no compensatory movements and 1 drop = 239 seconds)  L UE:  44.4 seconds (previously: 58.39 seconds)     Norms based on age/sex: (Lucy et. al., 2013)    Age Group Sex Non-Dominant (50th percentile) Dominant (50th percentile)   20-49 Male 23.2 24.1     Pt performance on Functional Dexterity Test implies non functional hand compared to norms for age/sex.    Score by Functional Level Range (dominant hand) Range (non-dominant hand)   Functional  16-25 seconds 18-27 seconds   Moderately Functional  26-33 seconds 28-45 seconds   Minimally Functional 34-50 seconds 46-55 seconds   Nonfunctional >50 seconds >55 seconds     Subluxation: NONE    Scapular winging: MINIMAL    FUGYL JOHNS ASSESSMENT OF MOTOR RECOVERY AFTER STROKE:   R UE:  UPPER EXTREMITY SEATED: 35/36   WRIST: 7/10   HAND: 1414   COORDINATION/SPEED: 6   OVERALL SCORE: 61/66   (Clinical change= 5 points, severe impairment <19, and mild impairment is >50)         Functional Cognition:  Highest level of education: some college     [place] Cognitive Assessment Version 8.1 (MoCA V8.1)   Visuospatial/executive functionin/5  Namin/3  Memory: 1st trial:  , 2nd trial:     Attention/concentration: 22  List of letters:   Serial Seven Subtraction:  3/3 w/ 0 errors  Language/sentence repetition:   Language Fluency: 0/1 (previously: 0  Abstract/Correlational Thinkin/2   Delayed Recall:   Orientation:                Memory Index Score: 14/15  MoCA V1 8.3 Raw Score: 25/30 (previously: 27/30), MIS:  13/15, indicative of no neurocognitive impairments.     MoCA Scoring              Normal: 26+              Mild Cognitive Impairment: 18-25               Moderate Cognitive Impairment: 10-17              Severe Cognitive Impairment: <10     Trail making Part A and Part B:  not re-assessed  Part A: 70 seconds (prior: 54 seconds I'ly)  Part B: 1:58 with 1 self-corrected error (Prior: 1:28 I'ly (previously 1 minute and 53.92 seconds independently))  Indicating deficits: Part A > 24.40 seconds and Part B > 50.68 seconds       Contextual Memory Test:  not re-assessed  Immediate:  (previously 10/20, initially 3/20)  Delayed:   (previously 10/20, initially 3/20)    Following re-evaluation completed 2x qbitz puzzles to work on dexterity,  skills.     GOALS:   Short Term Goals:  Pt will increase R UE  strength to 18 lbs to complete IADLs ACHIEVED   Pt will increase R FMC by being able to don 5 pegs in to 9 hole peg board in 1 minute and 40 seconds on 9 hole peg to complete ADLs and IADLs GOAL MET   Pt will increase  R UE strength to complete  of hand section of fugyl benavides for tabletop tasks ACHIEVED 24  Pt will increase  R UE strength to complete 4/6 of coordination section of fugyl benavides for tabletop tasks ACHIEVED 24        Long Term Goals: 8-12 weeks  Pt will increase R UE  strength to 25 lbs to complete IADLs ACHIEVED 24  Pt will increase R FMC by being able to don all 9 pegs in to 9 hole peg board in 3 minutes on 9 hole peg to complete ADLs and IADLs GOAL MET   Pt will increase  R UE strength to complete 10/14 of hand section  of fugyl benavides for tabletop tasks ACHIEVED 5/23/24  Pt will increase  R UE strength to complete 5/6 of coordination section of fugyl benavides for tabletop tasks ACHIEVED 9/25/24    New Goals as 5/23/24  Pt will demonstrate improved  strength as evidenced by increased ANGEL dynamometer to 40 lbs for improved performance in ADLs/IADLs ACHIEVED   Pt will demonstrate improved lateral pinch strength as evidenced by increase to 6 lbs for improved performance in ADLs/IADLs ACHIEVED  Pt will demonstrate improved three point pinch strength as evidenced by increase to 9 lbs for improved performance in ADLs/IADLs ACHIEVED  Pt will demonstrate improved tip pinch strength as evidenced by increase to 10 lbs for improved performance in ADLs/IADLs PROGRESSING  Pt will demonstrate improved FMC required for ADLs/IADLs as demonstrated by completing 9-hole peg test within 1 minute and 5 seconds Achieved 8/21  Pt will demonstrate improved dexterity required for ADLs/IADLs as demonstrated by completing FDT within 200 seconds, including penalties. ACHIEVED    UPDATED GOALS AS OF 7/11:  Pt will demonstrate improved  strength as evidenced by increased ANGEL dynamometer to 52 lbs for improved performance in ADLs/IADLs ACHIEVED 9/25/24    Pt will demonstrate improved dexterity required for ADLs/IADLs as demonstrated by completing FDT within 180 seconds, including penalties. PROGRESSING    New Goals as of 9/25/24:   Pt will improve  strength by 5 lbs (up to 70 lbs) in the R hand for improved performance during ADLs and IADLs. No change  Pt will improve time on TM parts B to within 70 seconds demonstrating improved divided attention required for IADLs. Decline    New goals 11/12:   -Pt will improve R UE strength to 5/5 in all planes as measured via MMT thru use of strengthening program and HEP.     OTHER PLANNED THERAPY INTERVENTIONS:   Supine, seated, and in stance neuro re-ed  Tricep AG  NMES/FES  FMC/prehension  Timed Trials  Manual  tx  Hand to target  Sensory re-ed  Seated functional reach: crossing midline  Supine place and hold  WBearing strategies   Closed chain activities  Open chain activities  Internal and external memory aides        Objective Measurement Tracker:    IE (2/15/24) 1st Re-eval:   (3/19/24) 2nd Re-eval:  (4/22/24) 3rd Re-eval:  9/25/24 11/12   MoCA 24/30 NOT TODAY/30 23/30 Not reassessed  25/30   TM Test A   To be completed next session Not reassessed 70 seconds Not assessed   TM Test B   To be completed next session Not reassessed 118 seconds Not assessed   CMT Immediate: 3/20, 0 confabulations  Delayed: 3/20, 0 confabulations NOT TODAY To be completed next session Not reassessed Not assessed Not assessed   Nine Hole Peg Test R hand: 3 pegs in 1 minute and 48.49 seconds   L hand: 35.14 seconds   R hand: all 9 pegs in and out in 1 minute and 36 seconds    L hand: 39.59 seconds  R hand: all 9 pegs in and out in 1 min. 31 secs. With 2 drops. L hand: 38.84 seconds. 1 min 16 seconds 52 seconds 60 seconds   Functional Dexterity Test NOT TODAY R UE: 2 minutes and 6 seconds to complete 8 with compensatory strategies including the board and 1 peg drop  L UE: 45 seconds  R UE: 3 mins. 50 secs. To complete with 7 drops/  compensations.   L UE: 58.39 secs. 258 seconds 189 seconds Not assessed    Strength R: 15lb,   L: 100lb R: 15lbs  L: 100lbs  R: 15 lbs  L: 100 lbs 35 lbs 65 lbs 65   Lateral Pinch Strength NOT TODAY  Not today   8 lbs 18 lbs 16   2 Point Pinch Strength NOT TODAY   Not today   4 lbs  6.5 lbs 7   3 Point Pinch Strength NOT TODAY   Not today  7 lbs  9 lbs 9   Manual Muscle Testing              Fugl Harrison UE: 36/36  Wrist: 10/10  Hand: 6/14  Coordination: 2/6 NOT TODAY  UE: 28/36  Wrist: 7/10  Hand: 10/14  Coordination:  3/6 UE: 36  Wrist: 7/10  Hand: 12/14  Coordination:   4/6  Overall 59/66 65/66 61/66

## 2024-11-12 NOTE — TELEPHONE ENCOUNTER
DC- HOME- 11/10/2024    1ST ATTEMPT,     VIA [employer]     Thank you,     Leidy

## 2024-11-13 NOTE — TELEPHONE ENCOUNTER
PA for calcitriol (ROCALTROL) 0.25 mcg capsule SUBMITTED to [facility]    via    []CMM-KEY:   []Surescripts-Case ID #   []Availity-Auth ID # NDC #   [x]Faxed to plan - completed form scanned into media  []Other website   [x]Phone call  Called [phone], spoke with coverage determination personal. They will be sending a form that needs to be filled out for this medication. Once form is received, I will complete and send back.     [x]PA sent as URGENT    All office notes, labs and other pertaining documents and studies sent. Clinical questions answered. Awaiting determination from insurance company.     Turnaround time for your insurance to make a decision on your Prior Authorization can take 7-21 business days.

## 2024-11-14 NOTE — Clinical Note
Mateus Mckeon discharge to home/self care.       [de-identified] :  I explained to the patient that the pain in his left arm is coming from a pinched nerve in his neck likely secondary to muscle spasms.  He will alternate ice and heat.  I wrote him a prescription for meloxicam 15 mg 1 tab once a day for the inflammation.  I gave him a prescription for therapy for stretching, strengthening, range of motion, and different modalities to help with the pain and inflammation.  He will see Jessica in about 4 to 6 weeks for repeat evaluation.  All questions were answered today.

## 2024-11-15 ENCOUNTER — OFFICE VISIT (OUTPATIENT)
Facility: CLINIC | Age: 39
End: 2024-11-15
Payer: MEDICARE

## 2024-11-15 DIAGNOSIS — I63.89 CEREBROVASCULAR ACCIDENT (CVA) DUE TO OTHER MECHANISM (HCC): Primary | ICD-10-CM

## 2024-11-15 PROCEDURE — 97530 THERAPEUTIC ACTIVITIES: CPT

## 2024-11-15 PROCEDURE — 97110 THERAPEUTIC EXERCISES: CPT

## 2024-11-15 NOTE — PROGRESS NOTES
"OT Daily Note     Today's date: 11/15/2024  Patient name: Mateus Mckeon  : 1985  MRN: [id number]  Referring provider: Dania Sher DO  Dx:   Encounter Diagnosis     ICD-10-CM    1. Cerebrovascular accident (CVA) due to other mechanism (HCC)  I63.89                 Start Time: 0800  Stop Time: 0845  Total time in clinic (min): 45 minutes    PLAN OF CARE START: 24  PLAN OF CARE END: 2024  FREQUENCY: 2x/wk  PRECAUTIONS: Renal failure, actively going through dialysis; type 1 diabetes; HTN; SAH; seizure (last one was 2019)  ON FLUID RESTRICTIONS--DO NOT OFFER WATER    Subjective: Pt reports pain in his feet, \"it's my neuropathy\"    Objective: See treatment diary below    TE: Completed the following for wrist/hand strength and motor control  -Green flex bar bends 30x up and 30x down, 30x twists for improved UE strength and sustained   - Y<>T combined shoulder movements while seated with 3lb weights for improved strength in shoulder girdle and UEs, improved graded control of movement x10 each direction  -Biceps curl with overhead press using 4 lb dumb bell 2x10 sets  - Gripping at 10lbs with focus on graded control - half  to full  until fatigue 1 min 30 sec on first trial,     TA:  - lateral pinch puttycizer tool drags and twists through yellow theraputty for improved hand strength and sustained pinch       Assessment: Pt tolerated session well today. Required frequent rest breaks due to fatigue in the R UE with strength exercises today. Good graded control of gross shoulder movement and  this session. Continues to lack terminal elbow extension on the R during overhead presses due to weakness and fatigue  Pt would benefit from continued skilled occupational therapy services to improve fine motor coordination, dexterity, strength, UE function, and functional cognition.    Plan: Continue per plan of care.       SHORT TERM GOALS ADDED 24:  Pt will increase sustained " attention by completing trail making part A in 35 seconds to complete IADLs NOT MET  Pt will increase divided attention by completing trail making part B in 1 minute 30 seconds to complete IADLs NOT MET  Pt will increase delayed recall and recall 4 /5 items on MOCA to complete IADLs GOAL MET    LONG TERM GOALS ADDED 2/20/24:  Pt will increase sustained attention by completing trail making part A in 38 seconds to complete IADLs  Pt will increase divided attention by completing trail making part B in 1 minute 10 seconds to complete IADLs  Pt will increase delayed recall and recall 5/5 items on MOCA to complete IADLs  Resume right  hand dominance to refined functional assist with all activities of daily living]

## 2024-11-15 NOTE — TELEPHONE ENCOUNTER
Called and spoke with [employer] and the medication will not be covered under Part B.    PA for calcitriol (ROCALTROL) 0.25 mcg capsule DENIED    Reason:(Screenshot if applicable)        Message sent to office clinical pool Yes    Denial letter scanned into Media Yes    Appeal started No (Provider will need to decide if appeal is warranted and send clinical documentation to Prior Authorization Team for initiation.)    **Please follow up with your patient regarding denial and next steps**

## 2024-11-18 ENCOUNTER — APPOINTMENT (OUTPATIENT)
Facility: CLINIC | Age: 39
End: 2024-11-18
Payer: MEDICARE

## 2024-11-18 ENCOUNTER — EVALUATION (OUTPATIENT)
Facility: CLINIC | Age: 39
End: 2024-11-18
Payer: MEDICARE

## 2024-11-18 DIAGNOSIS — R26.89 OTHER ABNORMALITIES OF GAIT AND MOBILITY: ICD-10-CM

## 2024-11-18 DIAGNOSIS — M25.552 LEFT HIP PAIN: ICD-10-CM

## 2024-11-18 DIAGNOSIS — I63.9 CEREBROVASCULAR ACCIDENT (CVA), UNSPECIFIED MECHANISM (HCC): Primary | ICD-10-CM

## 2024-11-18 DIAGNOSIS — Z91.81 STATUS POST FALL: ICD-10-CM

## 2024-11-18 PROCEDURE — 97163 PT EVAL HIGH COMPLEX 45 MIN: CPT

## 2024-11-18 NOTE — PROGRESS NOTES
PT Evaluation          POC expires Auth Status Total   Visits  Start date  Expiration date PT/OT + Visit Limit? Co-Insurance   2/10/25 Submitted to FD 25, AV pending pending pending BOMN Yes, $0 co-pay                                                    Today's date: 2024  Patient name: Mateus Mckeon  : 1985  MRN: [id number]  Referring provider: Dania Sher DO  Dx:   Encounter Diagnosis     ICD-10-CM    1. Cerebrovascular accident (CVA), unspecified mechanism (HCC)  I63.9       2. Status post fall  Z91.81 Ambulatory Referral to Physical Therapy      3. Left hip pain  M25.552 Ambulatory Referral to Physical Therapy      4. Other abnormalities of gait and mobility  R26.89                 Assessment  Assessment details: Patient is a 39 y.o. Male who is familiar to the balance center presents back following self-discharge after 24. During his hiatus he has had 2 falls and fell ill with COVID and subsequent ~5 day hospitalization. Past medical history also significant for ESRD on dialysis, and CVA's (most recent one in 2024). Patient also with reported seizure like activity ~2 weeks ago with unknown cause, not currently on medication however, does have follow-up neurology appointment in January. Coordination screen revealed undershooting and slowed movements at RUE and RLE as compare to L side suggesting dysmetria and dysdiadochokinesia which is consistent with patient history. Perception to light touch diminished bilaterally below knees likely due to neuropathy. Overall, good lower extremity strength with L>R as per MMT. Patient displays limitations in functional mobility as per TUG score which is significantly below previous score. He also displays regressed gait speed, balance, and balance confidence today as compared to at discharge per 10 meter walk test, MEDRANO, and ABC respectively. Patient is at a HIGH  risk for falls as per APTA and Rehab Measures Lab cutoff scores for aformentioned values. Deferred 5x STS secondary to patient reports of OH. Of importance, patient required frequent rest periods throughout session today suggesting great deficits in cardiovascular endurance. Patient displays great regression during hiatus from PT services and will undoubtly benefit from participation with skilled OPPT services to address limitations, reduce risk for falls, and promote return to PLOF.      Impairments: Abnormal coordination, Abnormal gait, Abnormal muscle tone, Abnormal or restricted ROM, Activity intolerance, Impaired balance, Impaired physical strength, Lacks appropriate HEP, Poor posture, Poor body mechanics, Pain with function, Safety issue, Weight-bearing intolerance, Abnormal movement, Difficulty understanding, Abnormal muscle firing  Understanding of Dx/Px/POC: Good  Prognosis: Good    Patient verbalized understanding of POC.    Please contact me if you have any questions or recommendations. Thank you for the referral and the opportunity to share in Mateus Mckeon's care.      Plan  Plan details: PT 2-3x/week   Patient would benefit from: Skilled PT  Planned modality interventions: Biofeedback, Cryotherapy, TENS, Thermotherapy  Planned therapy interventions: Abdominal trunk stabilization, ADL training, Balance, Balance/WB training, Breathing training, Body mechanics training, Coordination, Functional ROM exercises, Gait training, HEP, Joint Mobilization, Manual Therapy, Ma taping, Motor coordination training, Neuromuscular re-education, Patient education, Postural training, Strengthening, Stretching, Therapeutic activities, Therapeutic exercises, Therapeutic training, Transfer training, Activity modification, Work reintegration  Frequency: 2-3x/wk  Duration in weeks: 12 weeks  Plan of Care beginning date: 11/18/2024  Plan of Care expiration date: 12 weeks - 2/10/2025  Treatment plan discussed with:  Patient         Goals  Short Term Goals (4 weeks):    -Patient will complete 6 MWT to assess cardiovascular endurance and improve tolerance to activity  -Patient will be independent with HEP within 4 weeks  -Patient will be able to ambulate household distances (100ft) or greater with LRAD within 4 week  - Patient will improve 5xSTS score by 2.3 seconds to promote improved LE functional strength needed for ADLs  -Patient will improve ABC to 80% to demonstrate increased balance confidence and reduced fall risk.     Long Term Goals (12 weeks):  - Patient will be independent in a comprehensive home exercise program  - Patient will improve gait speed to 1.0 m/s to improve safety with community ambulation   - Patient will improve MEDRANO by 6 points to facilitate return to safe independent ambulation  - Patient will complete DGI to assess dynamic balance and safety with ambulation without AD  - Patient will improve 6 Minute Walk Test score by 190 feet to promote improved cardiovascular endurance  - Patient will report going on walks at least 3 days per week to promote independence and improved cardiovascular endurance  - Patient will report ~75% improvement in falls or near falls to promote safety in his community and independence in his household      Cut off score    All date taken from APTA Neuro Section or Rehab Measures      [place]:  Siva et al., 2018  MDC: 2.7 pts    She et al., 2011  Cut-off score: 45/56    Chronic CVA  < 44/56 high risk for falls (Siva et al., 2018)  < 47.5/56 slow walker status (Jus et al., 2011) 5xSTS: Jean Carlos 2010  MDC: 2.3 sec  Age Norms:  60-69: 11.1 sec  70-79: 12.6 sec  80-89: 14.8 sec    Yamileth 2010, Chronic Stroke  Chronic CVA: 12 sec   TUG  Nubia rodríguez alSusan, 2005  MDC: 2.9 sec    Cut off score:  >13.5 sec community dwelling adults  >32.2 frail elderly  <20 I for basic transfers  >30 dependent on transfers 10 Meter Walk Test:   Talia et al., 2006  Small meaningful change:  "0.06 m/s  Substantial meaningful change: 0.14 m/s  MCID: 0.16 m/s    < 0.4 m/s household ambulators  0.4 - 0.8 m/s limited community ambulators  > 0.8 m/s community ambulators   FGA: Deepika et al., 2010  MCID: 4.2 pts  Geriatrics/community < 22/30 fall risk  Geriatrics/community < 20/30 unexplained falls DGI  MDC: vestibular - 4 pts  MDC: geriatric/community - 3 pts  Falls risk <19/24   6 Minute Walk Test  Talia et al., 2006  MDC: 60.98 m (200.01 ft)    Verónica Norwood, & Ally, 2012  MCID: 34.4 m    Age Norms  60-69: M - 1876 ft   F - 1765 ft  70-79: M - 1729 ft   F - 1545 ft  80-89: M - 1368 ft   F - 1286 ft Modified Joel  0: No increase in tone  1: Catch and release or min resistance at end range  1+: Catch f/b min resistance throughout remainder (< half ROM)  2: Easily moved, but more marked tone throughout most ROM  3: Significant tone, PROM difficult  4: Rigid   MiniBest: Glenda et al., 2013  CVA < 17.5 fall risk Pass (Acute CVA)  MDC: 1.8 points (acute), 3.2 points (chronic)         Subjective    History of Present Illness  Mechanism of injury: Patient is familiar to balance center as he participated with OT and PT services before self-discharging from PT after 09/06/24 secondary to \"feeling ready and past the point of needing PT services\". Patient past medical history significant for CVA (3rd one in January), ESRD which he goes to dialysis for 3x a week, frequent falls, and COVID. During his hiatus from PT patient had 2 falls and COVID with subsequent ~5 day hospitalization on 10/18/24. He had a second hospital visit ~2 weeks ago where he was exhibiting seizure like activity. As per patient, medical professionals did not provide rationale for seizure like activity as imaging returned negative. He was not placed on any new medication and was discharged home. Follow-up with neurology in January; denies any new seizure like activity. He ambulates with his rollator while out in his community and cruises on furniture " at home despite recommended use of cane or RW at home. Patient largest complaint is that he feels overtly fatigued as his tolerance to activity has vastly decreased, increased word finding difficulties, and decreased balance. His major goal is to improve tolerance and return to level he was prior to self discharge.     Primary AD: rollator (PLOF he mostly used SPC and only used rollator for long distances like going to nephew's soccer game)   Assist level at home: lives with parents who are assisting with ADL's and IADL's, but he is transferring and walking independently.   WC usage: none  PLOF: using SPC mostly, still required some assist from parents for ADL's and IADL's   Patient goals: to walk with SPC again, work on balance, build up walking endurance     Pain  Pain in both feet due to neuropathy     Social Support  Steps to enter house: 2 NIRU  Stairs in house: full flight to 2nd floor (able to perform independently)    Lives in: 2 story home   Lives with: parents    Employment status: disabled   Hand dominance: right    Treatments  Previous treatment: PT, OT  Current treatment: PT, OT  Diagnostic Testing:       Objective   LE MMT   - R Hip Flexion: 4/5  - L Hip Flexion: 4+/5  - R Hip Extension: 4+/5  - L Hip Extension: 4+/5  - R Hip Abduction: 5/5  - L Hip abduction: 5/5  - R Hip adduction: 5/5  - L Hip adduction: 5/5  - R Knee Extension: 4+/5 - L Knee Extension: 4+/5  - R Knee Flexion: 4+/5  - L Knee Flexion: 4+/5  - R Ankle DF: 4/5  - L Ankle DF: 4/5  - R Ankle PF: 4+/5  - L Ankle PF: 4+/5    Sensation  - Light touch: neuropathy both feet up to below knee    Coordination  - Alternating Toe Taps: impaired  - Heel to shin: impaired   - Dysmetria: R side undershooting   - Dysdiadochokinesia: R slowed     Neglect  - R sided: No  - L sided: No    Gait  Wide COLBY, ataxic, decreased step length    Vitals:  HR: 55 bpm  SpO2: 97%  BP: 148/72 mmHg      Outcome Measures Initial Eval  1/30/24 PN/DC  9/4/2024 Initial  Evaluation  11/18/24   5xSTS 31.25 sec 13.67 sec   no UE defer   TUG 16.3 sec with rollator    23.6 sec no AD 11.48 sec   no AD 18.91 sec w/ rollator     17.16 sec no AD   10 meter  1.11 m/s self selected pace 0.84 m/s self selected pace    MEDRANO  49/56 32/56   FGA 12/30 15/30 defer   6MWT 900 ft 955 ft no AD defer   mCTSIB  FTEO firm  FTEC firm  FTEO foam  FTEC foam   30 sec  30 sec  30 sec  Unable defer   ABC  66.88% 62.5%        Precautions: Check BP's RUE only (fistula LUE), frequent falls  Past Medical History:   Diagnosis Date    Acute kidney injury (HCC)     Ambulates with cane     Anuria     Anxiety     Cellulitis of right elbow 03/31/2021    Chronic kidney disease     Depression     Diabetes mellitus (HCC)     Diarrhea     Emesis 10/24/2020    End stage renal disease (HCC) 02/11/2018    Formatting of this note might be different from the original. Last Assessment & Plan:  Secondary to DM.  On nightly PD.  Followed by Nephro.  [facility] Formatting of this note might be different from the original. Last Assessment & Plan:  Formatting of this note might be different from the original. 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    Esophagitis 07/21/2015    Falls     Gastroparesis     GERD (gastroesophageal reflux disease)     History of shingles 2010    History of transfusion 02/2018    no adverse reaction    Hyperlipidemia     Hyperphosphatemia     Hypertension     Hypoglycemia 07/15/2022    Itching     Mastoiditis of right side 07/15/2022    Muscle weakness     general unsteadiness    Obesity (BMI 30.0-34.9) 09/09/2019    Orthostatic hypotension 10/25/2020    Peripheral polyneuropathy 11/20/2019    PONV (postoperative nausea and vomiting) 01/26/2018    Protein-calorie malnutrition (HCC) 11/23/2020    Recurrent peritonitis (HCC) due to peritoneal dialysis catheter 07/31/2020    Retinopathy     Seizures (HCC)      early 2020 - one time    Skin abnormality     some dime size areas where skin was scratched from itching    Sleep apnea     Spontaneous bacterial peritonitis (HCC) 10/19/2020    Squamous cell skin cancer     left temple    Stroke (HCC)     x2 - off balance/no driving/fatigue    Swelling of both lower extremities     Traumatic onycholysis 07/21/2022    Vomiting     Wears glasses     Word finding difficulty 11/05/2024

## 2024-11-19 ENCOUNTER — OFFICE VISIT (OUTPATIENT)
Age: 39
End: 2024-11-19
Payer: MEDICARE

## 2024-11-19 ENCOUNTER — OFFICE VISIT (OUTPATIENT)
Facility: CLINIC | Age: 39
End: 2024-11-19
Payer: MEDICARE

## 2024-11-19 DIAGNOSIS — E83.39 HYPERPHOSPHATEMIA: Chronic | ICD-10-CM

## 2024-11-19 DIAGNOSIS — I16.1 HYPERTENSIVE EMERGENCY: ICD-10-CM

## 2024-11-19 DIAGNOSIS — I63.89 CEREBROVASCULAR ACCIDENT (CVA) DUE TO OTHER MECHANISM (HCC): Primary | ICD-10-CM

## 2024-11-19 DIAGNOSIS — G25.3 MYOCLONIC JERKING: Primary | ICD-10-CM

## 2024-11-19 PROCEDURE — 97112 NEUROMUSCULAR REEDUCATION: CPT

## 2024-11-19 PROCEDURE — 97110 THERAPEUTIC EXERCISES: CPT

## 2024-11-19 PROCEDURE — 99495 TRANSJ CARE MGMT MOD F2F 14D: CPT | Performed by: INTERNAL MEDICINE

## 2024-11-19 NOTE — PROGRESS NOTES
"OT Daily Note     Today's date: 2024  Patient name: Mateus Mckeon  : 1985  MRN: [id number]  Referring provider: Dania Sher DO  Dx:   Encounter Diagnosis     ICD-10-CM    1. Cerebrovascular accident (CVA) due to other mechanism (HCC)  I63.89                   Start Time: 0800  Stop Time: 0845  Total time in clinic (min): 45 minutes    PLAN OF CARE START: 24  PLAN OF CARE END: 2024  FREQUENCY: 2x/wk  PRECAUTIONS: Renal failure, actively going through dialysis; type 1 diabetes; HTN; SAH; seizure (last one was 2019)  ON FLUID RESTRICTIONS--DO NOT OFFER WATER    Subjective: Pt reports \"I played bingo this weekend and it made me realize that I lost something cognitively, I could not keep up.\"    Objective: See treatment diary below    TE: Completed the following for wrist/hand strength and motor control  -Green flex bar bends 30x up and 30x down, 30x twists for improved UE strength and sustained     NR:  - dexerciser x2 min each hand for improved coordination and motor control of BUEs  - 120 number board placing only highlighted numbers for improved sustained attention and visual scanning to improve leisure tasks of bingo        Assessment: Pt tolerated session well today. Session was focused on scanning and attention today due to c/o cognitive decline noted while attempting to play bingo with family this weekend. No cues required during 120 number board activity and good attention noted throughout. Pt would benefit from continued skilled occupational therapy services to improve fine motor coordination, dexterity, strength, UE function, and functional cognition.    Plan: Continue per plan of care.       SHORT TERM GOALS ADDED 24:  Pt will increase sustained attention by completing trail making part A in 35 seconds to complete IADLs NOT MET  Pt will increase divided attention by completing trail making part B in 1 minute 30 seconds to complete IADLs NOT MET  Pt will increase " delayed recall and recall 4 /5 items on MOCA to complete IADLs GOAL MET    LONG TERM GOALS ADDED 2/20/24:  Pt will increase sustained attention by completing trail making part A in 38 seconds to complete IADLs  Pt will increase divided attention by completing trail making part B in 1 minute 10 seconds to complete IADLs  Pt will increase delayed recall and recall 5/5 items on MOCA to complete IADLs  Resume right  hand dominance to refined functional assist with all activities of daily living]

## 2024-11-19 NOTE — ASSESSMENT & PLAN NOTE
-EEG revealed R temporal activity  -per patient and family preference, antiepileptic withheld with close follow up with Neuro

## 2024-11-19 NOTE — ASSESSMENT & PLAN NOTE
-BP significantly elevated at presentation with word difficulty  -acute CVA ruled out in patient with prior history of CVA  -bp meds adjusted, hydralazine 50mg TID prn SBP >150 initiated but patient admits he has been taking it ATC with occasional soft BP, advised to use as prescribed  -continue on losartan 100mg qhs, labetalol 400mg TID, procardia XL 90mg daily  -continue to monitor home bp  -follow up with Renal

## 2024-11-19 NOTE — PROGRESS NOTES
Transition of Care Visit  Name: Mateus Mckeon      : 1985      MRN: [id number]  Encounter Provider: Dania Sher DO  Encounter Date: 2024   Encounter department: [facility]    Assessment & Plan  Myoclonic jerking  -EEG revealed R temporal activity  -per patient and family preference, antiepileptic withheld with close follow up with Neuro       Hypertensive emergency  -BP significantly elevated at presentation with word difficulty  -acute CVA ruled out in patient with prior history of CVA  -bp meds adjusted, hydralazine 50mg TID prn SBP >150 initiated but patient admits he has been taking it ATC with occasional soft BP, advised to use as prescribed  -continue on losartan 100mg qhs, labetalol 400mg TID, procardia XL 90mg daily  -continue to monitor home bp  -follow up with Renal       Hyperphosphatemia  -advised to restart calcium acetate 667mg TID as previously prescribed              History of Present Illness     Transitional Care Management Review:   Mateus Mckeon is a 39 y.o. male here for TCM follow up.     During the TCM phone call patient stated:  TCM Call       Date and time call was made  2024 10:09 AM    Hospital care reviewed  Records reviewed    Patient was hospitialized at  [facility]    Comment  [facility] [place]     Date of Admission  24    Date of discharge  11/10/24    Diagnosis  hypertensive emergency    Disposition  Home    Were the patients medications reviewed and updated  Yes    Current Symptoms  None          TCM Call       Post hospital issues  None    Should patient be enrolled in anticoag monitoring?  No    Scheduled for follow up?  Yes    Patients specialists  Endocrinologist    Did you obtain your prescribed medications  Yes    Do you need help managing your prescriptions or medications  No    Is transportation to your appointment needed  No    I have advised the patient to call PCP with any new or  worsening symptoms  Renea Gunter MA    Living Arrangements  Family members    Support System  Family    The type of support provided  Emotional; Physical    Do you have social support  Yes, as much as I need    Are you recieving any outpatient services  No    Are you recieving home care services  No    Are you using any community resources  No    Current waiver services  No    Have you fallen in the last 12 months  No    Interperter language line needed  No    Counseling  Patient    Comments  Patient appointment shceduled for 4/15/21          HPI    He was hospitalized at [facility] 11/5-11/10 for hypertensive emergency.  He presented with SBP >200s associated with word finding difficulty.  CTH and CTA were negative, MRI brain negative of acute infarct.  Has h/o CVA.  He was treated in ICU with cardene gtt.  Received plavix until CVA was ruled out.      Hydralazine 50mg TID prn was added if SBP >150 and losartan 100mg was adjusted to qhs administration.  He was continued on labetalol 400mg TID, procardia XL 90mg daily, ASA and statin.    Myoclonic jerking was noted during his hospitalization.  EEG showed spikes in R temporal area but antiepleptics placed on hold until outpatient Neurology visit in January.  He denies family members mentioning other events.  He reports he is not having as much difficulty finding his words but is not at his baseline.  He is currently in OT.    He admits he has been taking hydralazine 50mg TID continuously, BP around 106/- and sometimes feels weak.    Admits he was not taking calcium acetate as prescribed.    Review of Systems   Constitutional:  Positive for fatigue. Negative for activity change, appetite change and fever.   Respiratory:  Negative for cough, shortness of breath and wheezing.    Cardiovascular:  Negative for chest pain, palpitations and leg swelling.   Musculoskeletal:  Positive for gait problem.   Neurological:  Positive for dizziness, speech difficulty and weakness.  Negative for headaches.       Objective   /97   Pulse 82   Resp 16   SpO2 97%     Physical Exam  Vitals reviewed.   Constitutional:       General: He is not in acute distress.  HENT:      Head: Normocephalic.      Comments: Wears face mask  Cardiovascular:      Rate and Rhythm: Normal rate and regular rhythm.      Pulses: Normal pulses.      Heart sounds: Normal heart sounds.   Pulmonary:      Effort: Pulmonary effort is normal. No respiratory distress.      Breath sounds: Normal breath sounds. No wheezing.   Musculoskeletal:      Right lower leg: No edema.      Left lower leg: No edema.   Skin:     Coloration: Skin is not pale.   Neurological:      Mental Status: He is alert and oriented to person, place, and time.   Psychiatric:         Mood and Affect: Mood normal.       Medications have been reviewed by provider in current encounter

## 2024-11-21 ENCOUNTER — TELEPHONE (OUTPATIENT)
Age: 39
End: 2024-11-21

## 2024-11-21 ENCOUNTER — OFFICE VISIT (OUTPATIENT)
Dept: PODIATRY | Facility: CLINIC | Age: 39
End: 2024-11-21
Payer: MEDICARE

## 2024-11-21 VITALS — SYSTOLIC BLOOD PRESSURE: 136 MMHG | BODY MASS INDEX: 27.48 KG/M2 | DIASTOLIC BLOOD PRESSURE: 97 MMHG | WEIGHT: 197 LBS

## 2024-11-21 DIAGNOSIS — K21.9 GASTROESOPHAGEAL REFLUX DISEASE WITHOUT ESOPHAGITIS: ICD-10-CM

## 2024-11-21 DIAGNOSIS — E10.42 DIABETIC POLYNEUROPATHY ASSOCIATED WITH TYPE 1 DIABETES MELLITUS (HCC): Primary | ICD-10-CM

## 2024-11-21 PROCEDURE — 99213 OFFICE O/P EST LOW 20 MIN: CPT | Performed by: PODIATRIST

## 2024-11-21 RX ORDER — FAMOTIDINE 40 MG/1
40 TABLET, FILM COATED ORAL EVERY MORNING
Qty: 90 TABLET | Refills: 1 | Status: SHIPPED | OUTPATIENT
Start: 2024-11-21

## 2024-11-21 NOTE — PROGRESS NOTES
Name: Mateus Mckeon      : 1985      MRN: [id number]  Encounter Provider: Miguel Ford DPM  Encounter Date: 2024   Encounter department: [facility] [place]    Discussed principles of diabetic footcare.  There are no palpable pedal pulses.  Sensorium is diminished per [place] Lion[place] monofilament.  Patient knows to refrain from walking barefoot.    Trimmed elongated toenails.  Mycotic toenails are not long at this time.  Reappoint 3 months.  :  Assessment & Plan  Diabetic polyneuropathy associated with type 1 diabetes mellitus (HCC)    Lab Results   Component Value Date    HGBA1C 6.9 (H) 2024                History of Present Illness     HPI  Mateus Mckeon is a 39 y.o. male who presents for his yearly diabetic foot exam and nail care.  Abrasion that has been present on the right foot has healed.  Patient has numbness and tingling in both feet.  His discomfort is tolerable.  Patient is in dialysis.    I personally reviewed an A1c dated 2024.  It was 6.9.    I personally reviewed an A1c dated 2024.  It was 6.2.          Review of Systems   Cardiovascular:         Coronary artery disease; CVA   Genitourinary:         Renal failure              Objective   /97   Wt 89.4 kg (197 lb)   BMI 27.48 kg/m²      Physical Exam  Cardiovascular:      Pulses: Pulses are weak.           Dorsalis pedis pulses are 0 on the right side and 0 on the left side.        Posterior tibial pulses are 0 on the right side and 0 on the left side.   Feet:      Right foot:      Skin integrity: No ulcer, skin breakdown, erythema, warmth, callus or dry skin.      Left foot:      Skin integrity: No ulcer, skin breakdown, erythema, warmth, callus or dry skin.         Diabetic Foot Exam    Patient's shoes and socks removed.    Right Foot/Ankle   Right Foot Inspection  Skin Exam: skin normal and skin intact. No dry skin, no warmth, no callus, no erythema, no maceration, no abnormal color,  no pre-ulcer, no ulcer and no callus.     Toe Exam: ROM and strength within normal limits.     Sensory   Vibration: absent  Proprioception: intact  Monofilament testing: diminished    Vascular  Capillary refills: < 3 seconds  The right DP pulse is 0. The right PT pulse is 0.     Right Toe  - Comprehensive Exam  Ecchymosis: none  Arch: normal  Hammertoes: absent  Claw Toes: absent  Swelling: none   Tenderness: none       Left Foot/Ankle  Left Foot Inspection  Skin Exam: skin normal and skin intact. No dry skin, no warmth, no erythema, no maceration, normal color, no pre-ulcer, no ulcer and no callus.     Toe Exam: ROM and strength within normal limits.     Sensory   Vibration: intact  Proprioception: intact  Monofilament testing: diminished    Vascular  Capillary refills: < 3 seconds  The left DP pulse is 0. The left PT pulse is 0.     Left Toe  - Comprehensive Exam  Ecchymosis: none  Arch: normal  Hammertoes: absent  Claw toes: absent  Swelling: none   Tenderness: none       Assign Risk Category  No deformity present  Loss of protective sensation  Weak pulses  Risk: 2

## 2024-11-21 NOTE — TELEPHONE ENCOUNTER
Pts mother scheduled MMR vaccine but I don't see the order in chart. She did say that he's been trying to get it for week but he's been sick.    Can the order be placed if its not already?    Please call mom, Francine, with any problems.  Thanks

## 2024-11-22 ENCOUNTER — OFFICE VISIT (OUTPATIENT)
Facility: CLINIC | Age: 39
End: 2024-11-22
Payer: MEDICARE

## 2024-11-22 DIAGNOSIS — I63.89 CEREBROVASCULAR ACCIDENT (CVA) DUE TO OTHER MECHANISM (HCC): Primary | ICD-10-CM

## 2024-11-22 PROCEDURE — 97110 THERAPEUTIC EXERCISES: CPT

## 2024-11-22 PROCEDURE — 97112 NEUROMUSCULAR REEDUCATION: CPT

## 2024-11-22 NOTE — PROGRESS NOTES
OT Daily Note     Today's date: 2024  Patient name: Mateus Mckeon  : 1985  MRN: [id number]  Referring provider: Dania Sher DO  Dx:   Encounter Diagnosis     ICD-10-CM    1. Cerebrovascular accident (CVA) due to other mechanism (HCC)  I63.89                     Start Time: 0848  Stop Time: 0930  Total time in clinic (min): 42 minutes    PLAN OF CARE START: 24  PLAN OF CARE END: 2024  FREQUENCY: 2x/wk  PRECAUTIONS: Renal failure, actively going through dialysis; type 1 diabetes; HTN; SAH; seizure (last one was 2019)  ON FLUID RESTRICTIONS--DO NOT OFFER WATER    Subjective: Pt reports no new changes today    Objective: See treatment diary below    TE:   - Nustep x5 min for improved UE endurance, strength, and proprioceptive input through level 3 resistance for pushing/pulling  - Green theraband triceps extensions (3x10) for improved endurance and triceps strength   - bicep curl->shoulder press for improved endurance and UE strength    NR:  - dexerciser 2x3 min sets with 2lb wrist weight donned for improved coordination and motor control of BUEs    TA:   - transferring foam blocks with gripper at 25lbs resistance maintaining elbow extension and shoulder at 90 degrees for improved sustained grasp,  strength, and RUE endurance         Assessment: Pt tolerated session well today. Steadily improving RUE triceps strength. Pt would benefit from continued skilled occupational therapy services to improve fine motor coordination, dexterity, strength, UE function, and functional cognition.    Plan: Continue per plan of care.       SHORT TERM GOALS ADDED 24:  Pt will increase sustained attention by completing trail making part A in 35 seconds to complete IADLs NOT MET  Pt will increase divided attention by completing trail making part B in 1 minute 30 seconds to complete IADLs NOT MET  Pt will increase delayed recall and recall 4 /5 items on MOCA to complete IADLs GOAL MET    LONG  TERM GOALS ADDED 2/20/24:  Pt will increase sustained attention by completing trail making part A in 38 seconds to complete IADLs  Pt will increase divided attention by completing trail making part B in 1 minute 10 seconds to complete IADLs  Pt will increase delayed recall and recall 5/5 items on MOCA to complete IADLs  Resume right  hand dominance to refined functional assist with all activities of daily living]

## 2024-11-23 VITALS
HEART RATE: 82 BPM | RESPIRATION RATE: 16 BRPM | OXYGEN SATURATION: 97 % | DIASTOLIC BLOOD PRESSURE: 97 MMHG | SYSTOLIC BLOOD PRESSURE: 136 MMHG

## 2024-11-23 PROBLEM — U07.1 COVID-19: Status: RESOLVED | Noted: 2024-10-18 | Resolved: 2024-11-23

## 2024-11-23 PROBLEM — E66.811 OBESITY (BMI 30.0-34.9): Chronic | Status: RESOLVED | Noted: 2019-09-09 | Resolved: 2024-11-23

## 2024-11-23 PROBLEM — M25.421 SWELLING OF JOINT OF UPPER ARM, RIGHT: Status: RESOLVED | Noted: 2024-04-03 | Resolved: 2024-11-23

## 2024-11-25 ENCOUNTER — OFFICE VISIT (OUTPATIENT)
Facility: CLINIC | Age: 39
End: 2024-11-25
Payer: MEDICARE

## 2024-11-25 DIAGNOSIS — R26.89 OTHER ABNORMALITIES OF GAIT AND MOBILITY: ICD-10-CM

## 2024-11-25 DIAGNOSIS — M25.552 LEFT HIP PAIN: ICD-10-CM

## 2024-11-25 DIAGNOSIS — I63.9 CEREBROVASCULAR ACCIDENT (CVA), UNSPECIFIED MECHANISM (HCC): ICD-10-CM

## 2024-11-25 DIAGNOSIS — Z91.81 STATUS POST FALL: Primary | ICD-10-CM

## 2024-11-25 PROCEDURE — 97110 THERAPEUTIC EXERCISES: CPT

## 2024-11-25 PROCEDURE — 97112 NEUROMUSCULAR REEDUCATION: CPT

## 2024-11-25 NOTE — PROGRESS NOTES
"Daily Note     Today's date: 2024  Patient name: Mateus Mckeon  : 1985  MRN: [id number]  Referring provider: Dania Sher DO  Dx:   Encounter Diagnosis     ICD-10-CM    1. Status post fall  Z91.81       2. Left hip pain  M25.552       3. Cerebrovascular accident (CVA), unspecified mechanism (HCC)  I63.9       4. Other abnormalities of gait and mobility  R26.89                     POC expires Auth Status Total   Visits  Start date  Expiration date PT/OT + Visit Limit? Co-Insurance   2/10/25 Submitted to FD 25, AV pending pending pending BOMN Yes, $0 co-pay                                               Subjective: Patient presents to PT with SPC stating he feels very tired and his blood pressure was high (SBP in th 200s) which reportedly improved following medication.     Objective: See treatment diary below  *FLUID RESTRICTION- do not offer water*     BP start of session: 160/84 mmHg   HR: 75 bpm  SpO2: 98%  Per [employer]: >180/110 mmHg at rest, or >250/115 mmHg with activity, need to stop     NMR/TE: Gait Belt and CGA/CS  - Side stepping: 15ft, 7 laps  - Fwd hurdles 6-9\": 15ft, 5 laps  - Seated ball squeezes: 2 min  - Lat step-ups 4\": 10x  - Overground ambulation w/ facility doft  - One foot on foam pad to ring placement on facility dog: 10x each  - Seated hip abd BTB: 2min    BP post: 148/82/mmHg    Assessment: Patient tolerated session well today with focus on balance based exercises and intermittent seated activities. Patient is highly fatigable as he required frequent seated rest periods to assure safety and recovery prior to completing following exercise. One instance of lateral LoB during step-over exercise requiring Iveth x1 P to avoid fall. He displayed improved postural stability as he familiarized self to activity suggesting good motor learning. Educated patient on BP parameters and advise he continue to monitor; patient in good verbal understanding. " Patient would benefit from continued PT to improve balance, endurance, and reduce fall risk.       Plan: Continue per plan of care.              Outcome Measures Initial Eval  1/30/24 PN/DC  9/4/2024 Initial Evaluation  11/18/24   5xSTS 31.25 sec 13.67 sec   no UE defer   TUG 16.3 sec with rollator    23.6 sec no AD 11.48 sec   no AD 18.91 sec w/ rollator     17.16 sec no AD   10 meter  1.11 m/s self selected pace 0.84 m/s self selected pace    MEDRANO  49/56 32/56   FGA 12/30 15/30 defer   6MWT 900 ft 955 ft no AD defer   mCTSIB  FTEO firm  FTEC firm  FTEO foam  FTEC foam   30 sec  30 sec  30 sec  Unable defer   ABC  66.88% 62.5%

## 2024-11-26 ENCOUNTER — OFFICE VISIT (OUTPATIENT)
Facility: CLINIC | Age: 39
End: 2024-11-26
Payer: MEDICARE

## 2024-11-26 ENCOUNTER — CLINICAL SUPPORT (OUTPATIENT)
Age: 39
End: 2024-11-26
Payer: MEDICARE

## 2024-11-26 DIAGNOSIS — Z23 ENCOUNTER FOR IMMUNIZATION: Primary | ICD-10-CM

## 2024-11-26 DIAGNOSIS — I63.89 CEREBROVASCULAR ACCIDENT (CVA) DUE TO OTHER MECHANISM (HCC): Primary | ICD-10-CM

## 2024-11-26 PROCEDURE — 90707 MMR VACCINE SC: CPT | Performed by: INTERNAL MEDICINE

## 2024-11-26 PROCEDURE — 97110 THERAPEUTIC EXERCISES: CPT

## 2024-11-26 PROCEDURE — 90471 IMMUNIZATION ADMIN: CPT | Performed by: INTERNAL MEDICINE

## 2024-11-26 PROCEDURE — 97530 THERAPEUTIC ACTIVITIES: CPT

## 2024-11-26 NOTE — PROGRESS NOTES
"OT Daily Note     Today's date: 2024  Patient name: Mateus Mckeon  : 1985  MRN: [id number]  Referring provider: Dania Sher DO  Dx:   Encounter Diagnosis     ICD-10-CM    1. Cerebrovascular accident (CVA) due to other mechanism (HCC)  I63.89             Start Time: 0845  Stop Time: 0930  Total time in clinic (min): 45 minutes    PLAN OF CARE START: 24  PLAN OF CARE END: 2024  FREQUENCY: 2x/wk  PRECAUTIONS: Renal failure, actively going through dialysis; type 1 diabetes; HTN; SAH; seizure (last one was 2019)  ON FLUID RESTRICTIONS--DO NOT OFFER WATER    Subjective: Pt reports \"I woke up with a stiff neck and just feel off today\"    Objective: See treatment diary below    TE:   - Nustep x5 min for improved UE endurance, strength, and proprioceptive input through level 5 resistance for pushing/pulling  - green theraband ER (BL) with shoulders in neutral for improved UE strength and endurance  - Red therabar up/down bends 2x10 each for improved UE strength, endurance, and sustained grasp  - Standing push ups for improved tricep strength and UE endurance (2x10)    TA:   - Multimatrix cubes with use of green clothespin to transfer with 1.5lb wrist weight donned to work on attention for playing bingo, pinch strength, and UE endurance       Assessment: Pt tolerated session well today. Pt appears unsteady on his feet today; brief LOB when doffing jacket requiring CGA to recover. Pt did require increased rest breaks this session due to RUE fatigue. Pt would benefit from continued skilled occupational therapy services to improve fine motor coordination, dexterity, strength, UE function, and functional cognition.    Plan: Continue per plan of care.       SHORT TERM GOALS ADDED 24:  Pt will increase sustained attention by completing trail making part A in 35 seconds to complete IADLs NOT MET  Pt will increase divided attention by completing trail making part B in 1 minute 30 seconds to " complete IADLs NOT MET  Pt will increase delayed recall and recall 4 /5 items on MOCA to complete IADLs GOAL MET    LONG TERM GOALS ADDED 2/20/24:  Pt will increase sustained attention by completing trail making part A in 38 seconds to complete IADLs  Pt will increase divided attention by completing trail making part B in 1 minute 10 seconds to complete IADLs  Pt will increase delayed recall and recall 5/5 items on MOCA to complete IADLs  Resume right  hand dominance to refined functional assist with all activities of daily living]

## 2024-11-29 ENCOUNTER — OFFICE VISIT (OUTPATIENT)
Facility: CLINIC | Age: 39
End: 2024-11-29
Payer: MEDICARE

## 2024-11-29 DIAGNOSIS — I63.89 CEREBROVASCULAR ACCIDENT (CVA) DUE TO OTHER MECHANISM (HCC): Primary | ICD-10-CM

## 2024-11-29 PROCEDURE — 97110 THERAPEUTIC EXERCISES: CPT

## 2024-11-29 PROCEDURE — 97112 NEUROMUSCULAR REEDUCATION: CPT

## 2024-11-29 PROCEDURE — 97530 THERAPEUTIC ACTIVITIES: CPT

## 2024-11-29 NOTE — PROGRESS NOTES
OT Daily Note     Today's date: 2024  Patient name: Mateus Mckeon  : 1985  MRN: [id number]  Referring provider: Dania Sher DO  Dx:   Encounter Diagnosis     ICD-10-CM    1. Cerebrovascular accident (CVA) due to other mechanism (HCC)  I63.89                          PLAN OF CARE START: 24  PLAN OF CARE END: 2024  FREQUENCY: 2x/wk  PRECAUTIONS: Renal failure, actively going through dialysis; type 1 diabetes; HTN; SAH; seizure (last one was 2019)  ON FLUID RESTRICTIONS--DO NOT OFFER WATER    Subjective: Pt reports he forgot his cane so he is using his rollator today    Objective: See treatment diary below      NM:  - standing push ups 3x10 for proprioceptive input to UEs, improve strength, and endurance   - Jux-a-cisor 3x2 min with 1.5lb wrist weight donned for improved coordination and UE endurance    TE:   - bicep curl to shoulder press with 3lbs, 3x10 for improved UE strength and endurance, with emphasis on full R elbow extension    TA:  - finger<>palm translation with stabilization using tapered pegs to work on FMC and dexterity      Assessment: Pt tolerated session well today. He had difficulty with translation with stabilization using his R hand; required only manipulating one at a time due to frequent drops with more than 1 peg. Pt does continue to require increased rest breaks due to RUE fatigue. Pt would benefit from continued skilled occupational therapy services to improve fine motor coordination, dexterity, strength, UE function, and functional cognition.    Plan: Continue per plan of care.       SHORT TERM GOALS ADDED 24:  Pt will increase sustained attention by completing trail making part A in 35 seconds to complete IADLs NOT MET  Pt will increase divided attention by completing trail making part B in 1 minute 30 seconds to complete IADLs NOT MET  Pt will increase delayed recall and recall 4 /5 items on MOCA to complete IADLs GOAL MET    LONG TERM GOALS ADDED  2/20/24:  Pt will increase sustained attention by completing trail making part A in 38 seconds to complete IADLs  Pt will increase divided attention by completing trail making part B in 1 minute 10 seconds to complete IADLs  Pt will increase delayed recall and recall 5/5 items on MOCA to complete IADLs  Resume right  hand dominance to refined functional assist with all activities of daily living]

## 2024-12-02 DIAGNOSIS — F41.1 GENERALIZED ANXIETY DISORDER: ICD-10-CM

## 2024-12-02 DIAGNOSIS — F33.0 MILD EPISODE OF RECURRENT MAJOR DEPRESSIVE DISORDER (HCC): ICD-10-CM

## 2024-12-03 ENCOUNTER — APPOINTMENT (OUTPATIENT)
Facility: CLINIC | Age: 39
End: 2024-12-03
Payer: MEDICARE

## 2024-12-04 RX ORDER — TRAZODONE HYDROCHLORIDE 50 MG/1
TABLET, FILM COATED ORAL
Qty: 30 TABLET | Refills: 0 | Status: SHIPPED | OUTPATIENT
Start: 2024-12-04

## 2024-12-04 NOTE — TELEPHONE ENCOUNTER
Called and left message for patient to return a call to [phone] and schedule follow up with provider (Dr. Bermeo ).  Please schedule upon return call. Thank you.    Call Center;  Pt was supposed to be seen last 7/2024 but pt missed the appt so please schedule the soonest available.

## 2024-12-05 ENCOUNTER — OFFICE VISIT (OUTPATIENT)
Facility: CLINIC | Age: 39
End: 2024-12-05
Payer: MEDICARE

## 2024-12-05 DIAGNOSIS — I63.89 CEREBROVASCULAR ACCIDENT (CVA) DUE TO OTHER MECHANISM (HCC): Primary | ICD-10-CM

## 2024-12-05 PROCEDURE — 97110 THERAPEUTIC EXERCISES: CPT

## 2024-12-05 NOTE — PROGRESS NOTES
OT Daily Note     Today's date: 2024  Patient name: Mateus Mckeon  : 1985  MRN: [id number]  Referring provider: Dania Sher DO  Dx:   Encounter Diagnosis     ICD-10-CM    1. Cerebrovascular accident (CVA) due to other mechanism (HCC)  I63.89         Start Time: 845  Stop Time: 928  Total time in clinic (min): 43 minutes    PLAN OF CARE START: 24  PLAN OF CARE END: 2024  FREQUENCY: 2x/wk  PRECAUTIONS: Renal failure, actively going through dialysis; type 1 diabetes; HTN; SAH; seizure (last one was 2019)  ON FLUID RESTRICTIONS--DO NOT OFFER WATER    Subjective: Pt reports he forgot his cane so he is using his rollator today    Objective: See treatment diary below    TE:   - bicep curl to shoulder press with 4lbs, 3x10 for improved UE strength and endurance, with emphasis on full R elbow extension  -Completed bent over rows with R UE and L UE support.   -Green flex bar bends 30x upward and 30x downward for forearm supination/pronation.   -Gripping with shoulder at 90* flexion and elbow in full extension. Gripper with 35 lbs resistance, completing 3x10 sets.     TA:  -Boading balls completed with palm up in both directions to work on in hand manipulation/dexterity.     Assessment: Pt tolerated session well today. Occasional drops of boading balls, but doing well overall with them. Pt does continue to require increased rest breaks due to RUE fatigue. Pt would benefit from continued skilled occupational therapy services to improve fine motor coordination, dexterity, strength, UE function, and functional cognition.    Plan: Continue per plan of care.       SHORT TERM GOALS ADDED 24:  Pt will increase sustained attention by completing trail making part A in 35 seconds to complete IADLs NOT MET  Pt will increase divided attention by completing trail making part B in 1 minute 30 seconds to complete IADLs NOT MET  Pt will increase delayed recall and recall 4 /5 items on MOCA to  complete IADLs GOAL MET    LONG TERM GOALS ADDED 2/20/24:  Pt will increase sustained attention by completing trail making part A in 38 seconds to complete IADLs  Pt will increase divided attention by completing trail making part B in 1 minute 10 seconds to complete IADLs  Pt will increase delayed recall and recall 5/5 items on MOCA to complete IADLs  Resume right  hand dominance to refined functional assist with all activities of daily living]

## 2024-12-09 ENCOUNTER — HOSPITAL ENCOUNTER (EMERGENCY)
Facility: HOSPITAL | Age: 39
Discharge: HOME | End: 2024-12-10
Attending: EMERGENCY MEDICINE
Payer: MEDICARE

## 2024-12-09 ENCOUNTER — APPOINTMENT (EMERGENCY)
Dept: RADIOLOGY | Facility: HOSPITAL | Age: 39
End: 2024-12-09
Payer: MEDICARE

## 2024-12-09 VITALS
TEMPERATURE: 98.9 F | DIASTOLIC BLOOD PRESSURE: 88 MMHG | HEART RATE: 83 BPM | SYSTOLIC BLOOD PRESSURE: 164 MMHG | RESPIRATION RATE: 18 BRPM | OXYGEN SATURATION: 97 %

## 2024-12-09 DIAGNOSIS — R19.7 NAUSEA VOMITING AND DIARRHEA: ICD-10-CM

## 2024-12-09 DIAGNOSIS — R03.0 ELEVATED BLOOD PRESSURE READING: ICD-10-CM

## 2024-12-09 DIAGNOSIS — R07.9 ACUTE CHEST PAIN: ICD-10-CM

## 2024-12-09 DIAGNOSIS — R53.83 FATIGUE: Primary | ICD-10-CM

## 2024-12-09 DIAGNOSIS — K52.9 GASTROENTERITIS: ICD-10-CM

## 2024-12-09 DIAGNOSIS — R11.2 NAUSEA VOMITING AND DIARRHEA: ICD-10-CM

## 2024-12-09 LAB
ALBUMIN SERPL BCG-MCNC: 4.4 G/DL (ref 3.5–5)
ALP SERPL-CCNC: 114 U/L (ref 34–104)
ALT SERPL W P-5'-P-CCNC: 12 U/L (ref 7–52)
ANION GAP SERPL CALCULATED.3IONS-SCNC: 8 MMOL/L (ref 4–13)
AST SERPL W P-5'-P-CCNC: 17 U/L (ref 13–39)
BASOPHILS # BLD AUTO: 0.06 THOUSANDS/ÂΜL (ref 0–0.1)
BASOPHILS NFR BLD AUTO: 1 % (ref 0–1)
BILIRUB SERPL-MCNC: 0.58 MG/DL (ref 0.2–1)
BUN SERPL-MCNC: 17 MG/DL (ref 5–25)
CALCIUM SERPL-MCNC: 8.8 MG/DL (ref 8.4–10.2)
CARDIAC TROPONIN I PNL SERPL HS: 17 NG/L (ref ?–50)
CHLORIDE SERPL-SCNC: 96 MMOL/L (ref 96–108)
CO2 SERPL-SCNC: 38 MMOL/L (ref 21–32)
CREAT SERPL-MCNC: 7.36 MG/DL (ref 0.6–1.3)
EOSINOPHIL # BLD AUTO: 0.36 THOUSAND/ÂΜL (ref 0–0.61)
EOSINOPHIL NFR BLD AUTO: 7 % (ref 0–6)
ERYTHROCYTE [DISTWIDTH] IN BLOOD BY AUTOMATED COUNT: 13.3 % (ref 11.6–15.1)
FLUAV AG UPPER RESP QL IA.RAPID: NEGATIVE
FLUBV AG UPPER RESP QL IA.RAPID: NEGATIVE
GFR SERPL CREATININE-BSD FRML MDRD: 8 ML/MIN/1.73SQ M
GLUCOSE SERPL-MCNC: 143 MG/DL (ref 65–140)
HCT VFR BLD AUTO: 30.5 % (ref 36.5–49.3)
HGB BLD-MCNC: 10.1 G/DL (ref 12–17)
IMM GRANULOCYTES # BLD AUTO: 0.02 THOUSAND/UL (ref 0–0.2)
IMM GRANULOCYTES NFR BLD AUTO: 0 % (ref 0–2)
LYMPHOCYTES # BLD AUTO: 1.36 THOUSANDS/ÂΜL (ref 0.6–4.47)
LYMPHOCYTES NFR BLD AUTO: 27 % (ref 14–44)
MAGNESIUM SERPL-MCNC: 2.4 MG/DL (ref 1.9–2.7)
MCH RBC QN AUTO: 32.6 PG (ref 26.8–34.3)
MCHC RBC AUTO-ENTMCNC: 33.1 G/DL (ref 31.4–37.4)
MCV RBC AUTO: 98 FL (ref 82–98)
MONOCYTES # BLD AUTO: 0.32 THOUSAND/ÂΜL (ref 0.17–1.22)
MONOCYTES NFR BLD AUTO: 6 % (ref 4–12)
NEUTROPHILS # BLD AUTO: 2.88 THOUSANDS/ÂΜL (ref 1.85–7.62)
NEUTS SEG NFR BLD AUTO: 59 % (ref 43–75)
NRBC BLD AUTO-RTO: 0 /100 WBCS
PLATELET # BLD AUTO: 274 THOUSANDS/UL (ref 149–390)
PMV BLD AUTO: 10.6 FL (ref 8.9–12.7)
POTASSIUM SERPL-SCNC: 4 MMOL/L (ref 3.5–5.3)
PROT SERPL-MCNC: 6.9 G/DL (ref 6.4–8.4)
RBC # BLD AUTO: 3.1 MILLION/UL (ref 3.88–5.62)
SARS-COV+SARS-COV-2 AG RESP QL IA.RAPID: NEGATIVE
SODIUM SERPL-SCNC: 142 MMOL/L (ref 135–147)
WBC # BLD AUTO: 5 THOUSAND/UL (ref 4.31–10.16)

## 2024-12-09 PROCEDURE — 93005 ELECTROCARDIOGRAM TRACING: CPT

## 2024-12-09 PROCEDURE — 36415 COLL VENOUS BLD VENIPUNCTURE: CPT

## 2024-12-09 PROCEDURE — 85025 COMPLETE CBC W/AUTO DIFF WBC: CPT | Performed by: EMERGENCY MEDICINE

## 2024-12-09 PROCEDURE — 80053 COMPREHEN METABOLIC PANEL: CPT | Performed by: EMERGENCY MEDICINE

## 2024-12-09 PROCEDURE — 71046 X-RAY EXAM CHEST 2 VIEWS: CPT

## 2024-12-09 PROCEDURE — 93308 TTE F-UP OR LMTD: CPT | Performed by: EMERGENCY MEDICINE

## 2024-12-09 PROCEDURE — 87811 SARS-COV-2 COVID19 W/OPTIC: CPT | Performed by: EMERGENCY MEDICINE

## 2024-12-09 PROCEDURE — 83735 ASSAY OF MAGNESIUM: CPT | Performed by: EMERGENCY MEDICINE

## 2024-12-09 PROCEDURE — 99285 EMERGENCY DEPT VISIT HI MDM: CPT | Performed by: EMERGENCY MEDICINE

## 2024-12-09 PROCEDURE — 84484 ASSAY OF TROPONIN QUANT: CPT | Performed by: EMERGENCY MEDICINE

## 2024-12-09 PROCEDURE — 99284 EMERGENCY DEPT VISIT MOD MDM: CPT

## 2024-12-09 PROCEDURE — 87804 INFLUENZA ASSAY W/OPTIC: CPT | Performed by: EMERGENCY MEDICINE

## 2024-12-10 ENCOUNTER — APPOINTMENT (OUTPATIENT)
Facility: CLINIC | Age: 39
End: 2024-12-10
Payer: MEDICARE

## 2024-12-10 LAB
2HR DELTA HS TROPONIN: -1 NG/L
ATRIAL RATE: 81 BPM
CARDIAC TROPONIN I PNL SERPL HS: 16 NG/L (ref ?–50)
P AXIS: 67 DEGREES
PR INTERVAL: 166 MS
QRS AXIS: 70 DEGREES
QRSD INTERVAL: 72 MS
QT INTERVAL: 416 MS
QTC INTERVAL: 483 MS
T WAVE AXIS: -36 DEGREES
VENTRICULAR RATE: 81 BPM

## 2024-12-10 PROCEDURE — 93010 ELECTROCARDIOGRAM REPORT: CPT | Performed by: INTERNAL MEDICINE

## 2024-12-10 PROCEDURE — 84484 ASSAY OF TROPONIN QUANT: CPT | Performed by: EMERGENCY MEDICINE

## 2024-12-10 PROCEDURE — 36415 COLL VENOUS BLD VENIPUNCTURE: CPT | Performed by: EMERGENCY MEDICINE

## 2024-12-10 RX ORDER — HYOSCYAMINE SULFATE 0.125 MG
0.12 TABLET ORAL EVERY 4 HOURS PRN
Qty: 30 TABLET | Refills: 0 | Status: SHIPPED | OUTPATIENT
Start: 2024-12-10

## 2024-12-10 RX ORDER — ONDANSETRON 4 MG/1
4 TABLET, FILM COATED ORAL EVERY 6 HOURS
Qty: 12 TABLET | Refills: 0 | Status: SHIPPED | OUTPATIENT
Start: 2024-12-10

## 2024-12-10 NOTE — ED PROVIDER NOTES
Time reflects when diagnosis was documented in both MDM as applicable and the Disposition within this note       Time User Action Codes Description Comment    12/10/2024 12:37 AM Noah Fairchild Add [K52.9] Gastroenteritis     12/10/2024 12:37 AM Noah Fairchild Add [R53.83] Fatigue     12/10/2024  1:32 AM Legkezia, Christopher A Modify [K52.9] Gastroenteritis     12/10/2024  1:32 AM Legare, Christopher A Modify [R53.83] Fatigue     12/10/2024  1:32 AM Legare, Christopher A Add [R03.0] Elevated blood pressure reading     12/10/2024  1:32 AM Legare, Christopher A Add [R11.2,  R19.7] Nausea vomiting and diarrhea     12/10/2024  1:32 AM Legkezia, Christopher A Add [R07.9] Acute chest pain           ED Disposition       ED Disposition   Discharge    Condition   Stable    Date/Time   Tue Dec 10, 2024 12:37 AM    Comment   Mateus Mckeon discharge to home/self care.                   Assessment & Plan       Medical Decision Making  39-year-old male with history of ESRD on MWF dialysis presents with overall fatigue.  Differential diagnosis includes but is not limited to gastroenteritis, COVID-19, influenza, pneumonia, electrolyte derangement, less likely ACS, less likely pericarditis/myocarditis given current absence of chest pain.  Obtain laboratory analysis without acute pertinent findings.  EKG as interpreted by myself as below.  Chest x-ray without acute cardiopulmonary disease.  POCUS obtained showing no pericardial effusion, and overall global function within normal limits.  Discussed findings with patient as well as father at bedside. Patient appears well, nontoxic, agrees with plan of care at this time.  Answered all questions.  In light of this, patient would benefit from outpatient follow-up.    Amount and/or Complexity of Data Reviewed  Labs: ordered. Decision-making details documented in ED Course.  Radiology: ordered and independent interpretation performed.    Risk  Prescription drug  management.        ED Course as of 12/10/24 0224   Mon Dec 09, 2024   2236 hs TnI 0hr: 17  Decreased from 1 month ago   Tue Dec 10, 2024   0036 Delta 2hr hsTnI: -1       Medications - No data to display    ED Risk Strat Scores   HEART Risk Score      Flowsheet Row Most Recent Value   Heart Score Risk Calculator    History 0 Filed at: 12/10/2024 0011   ECG 1 Filed at: 12/10/2024 0011   Age 0 Filed at: 12/10/2024 0011   Risk Factors 2 Filed at: 12/10/2024 0011   Troponin 1 Filed at: 12/10/2024 0011   HEART Score 4 Filed at: 12/10/2024 0011                           PERC Rule for PE      Flowsheet Row Most Recent Value   PERC Rule for PE    Age >=50 0 Filed at: 12/10/2024 0011   HR >=100 0 Filed at: 12/10/2024 0011   O2 Sat on room air < 95% 0 Filed at: 12/10/2024 0011   History of PE or DVT 0 Filed at: 12/10/2024 0011   Recent trauma or surgery 0 Filed at: 12/10/2024 0011   Hemoptysis 0 Filed at: 12/10/2024 0011   Exogenous estrogen 0 Filed at: 12/10/2024 0011   Unilateral leg swelling 0 Filed at: 12/10/2024 0011   PERC Rule for PE Results 0 Filed at: 12/10/2024 0011            SBIRT 22yo+      Flowsheet Row Most Recent Value   Initial Alcohol Screen: US AUDIT-C     1. How often do you have a drink containing alcohol? 0 Filed at: 12/09/2024 2255   2. How many drinks containing alcohol do you have on a typical day you are drinking?  0 Filed at: 12/09/2024 2255   3a. Male UNDER 65: How often do you have five or more drinks on one occasion? 0 Filed at: 12/09/2024 2255   3b. FEMALE Any Age, or MALE 65+: How often do you have 4 or more drinks on one occassion? 0 Filed at: 12/09/2024 2255   Audit-C Score 0 Filed at: 12/09/2024 2255   ALETHEA: How many times in the past year have you...    Used an illegal drug or used a prescription medication for non-medical reasons? Never Filed at: 12/09/2024 9490            Martinez' Criteria for PE      Flowsheet Row Most Recent Value   Martinez' Criteria for PE    Clinical signs and symptoms of  DVT 0 Filed at: 12/10/2024 0011   PE is primary diagnosis or equally likely 0 Filed at: 12/10/2024 0011   HR >100 0 Filed at: 12/10/2024 0011   Immobilization at least 3 days or Surgery in the previous 4 weeks 0 Filed at: 12/10/2024 0011   Previous, objectively diagnosed PE or DVT 0 Filed at: 12/10/2024 0011   Hemoptysis 0 Filed at: 12/10/2024 0011   Malignancy with treatment within 6 months or palliative 0 Filed at: 12/10/2024 0011   Wells' Criteria Total 0 Filed at: 12/10/2024 0011                        History of Present Illness       Chief Complaint   Patient presents with    Fatigue     Reports feeling unwell for 10 days, lethargy, nausea, cough. Reports CP last night that has resolved, denies SOB.   Reports had covid 2 months ago and feels like that again.  Hx Dialysis Mon, wed, fri.        Past Medical History:   Diagnosis Date    Acute kidney injury (HCC)     Ambulates with cane     Anuria     Anxiety     Cellulitis of right elbow 03/31/2021    Chronic kidney disease     COVID-19 10/18/2024    Depression     Diabetes mellitus (HCC)     Diarrhea     Emesis 10/24/2020    End stage renal disease (HCC) 02/11/2018    Formatting of this note might be different from the original. Last Assessment & Plan:  Secondary to DM.  On nightly PD.  Followed by [facility].  [facility] Formatting of this note might be different from the original. Last Assessment & Plan:  Formatting of this note might be different from the original. 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    Esophagitis 07/21/2015    Falls     Gastroparesis     GERD (gastroesophageal reflux disease)     History of shingles 2010    History of transfusion 02/2018    no adverse reaction    Hyperlipidemia     Hyperphosphatemia     Hypertension     Hypoglycemia 07/15/2022    Itching     Mastoiditis of right side 07/15/2022    Muscle weakness     general unsteadiness     Obesity (BMI 30.0-34.9) 09/09/2019    Orthostatic hypotension 10/25/2020    Peripheral polyneuropathy 11/20/2019    PONV (postoperative nausea and vomiting) 01/26/2018    Protein-calorie malnutrition (HCC) 11/23/2020    Recurrent peritonitis (HCC) due to peritoneal dialysis catheter 07/31/2020    Retinopathy     Seizures (HCC)     early 2020 - one time    Skin abnormality     some dime size areas where skin was scratched from itching    Sleep apnea     Spontaneous bacterial peritonitis (HCC) 10/19/2020    Squamous cell skin cancer     left temple    Stroke (HCC)     x2 - off balance/no driving/fatigue    Swelling of both lower extremities     Swelling of joint of upper arm, right 04/03/2024    Traumatic onycholysis 07/21/2022    Vomiting     Wears glasses     Word finding difficulty 11/05/2024      Past Surgical History:   Procedure Laterality Date    CARDIAC ELECTROPHYSIOLOGY PROCEDURE N/A 9/21/2023    Procedure: Cardiac loop recorder explant;  Surgeon: Parish Morgan MD;  Location: [facility];  Service: Cardiology    CARDIAC LOOP RECORDER  05/2018    COLONOSCOPY      EGD      EYE SURGERY Right     HEMODIALYSIS ADULT  11/6/2024    IR AV FISTULAGRAM/GRAFTOGRAM  02/23/2021    IR CEREBRAL ANGIOGRAPHY  1/12/2024    IR CEREBRAL ANGIOGRAPHY / INTERVENTION  1/5/2024    IR TUNNELED CENTRAL LINE PLACEMENT  02/16/2021    IR TUNNELED DIALYSIS CATHETER PLACEMENT  11/18/2020    IR TUNNELED DIALYSIS CATHETER REMOVAL  02/12/2021    IR TUNNELED DIALYSIS CATHETER REMOVAL  03/11/2021    MOHS SURGERY Left 12/14/2022    Left temple with Dr. Hassan    PERITONEAL CATHETER INSERTION N/A 08/27/2018    Procedure: UNROOF PD CATHETER;  Surgeon: Felipe Lindo DO;  Location: [facility];  Service: General    WV ARTERIOVENOUS ANASTOMOSIS OPEN DIRECT Left 11/09/2020    Procedure: CREATION FISTULA  ARTERIOVENOUS (AV) - LEFT WRIST;  Surgeon: Placido Altamirano MD;  Location: [facility];  Service: Vascular    WV ESOPHAGOGASTRODUODENOSCOPY  TRANSORAL DIAGNOSTIC N/A 2019    Procedure: ESOPHAGOGASTRODUODENOSCOPY (EGD);  Surgeon: Ale Figueroa MD;  Location: [facility];  Service: Gastroenterology    PA LAPS INSERTION TUNNELED INTRAPERITONEAL CATHETER N/A 2018    Procedure: LAPAROSCOPIC PD CATHETER PLACEMENT;  Surgeon: Felipe Lindo DO;  Location: [facility];  Service: General    PA REMOVAL TUNNELED INTRAPERITONEAL CATHETER N/A 2020    Procedure: REMOVAL CATHETER PERITONEAL DIALYSIS;  Surgeon: Abdifatah Ty MD;  Location: [facility];  Service: General    TONSILLECTOMY      UPPER GASTROINTESTINAL ENDOSCOPY        Family History   Problem Relation Age of Onset    Breast cancer Mother     Hypertension Mother     Hyperlipidemia Father     Hypertension Father     Leukemia Maternal Grandmother     Hyperlipidemia Maternal Grandfather     Hypertension Maternal Grandfather     Hyperlipidemia Paternal Grandmother     Hypertension Paternal Grandmother     Heart disease Paternal Grandfather         cardiac disorder    Diabetes Paternal Grandfather       Social History     Tobacco Use    Smoking status: Former     Current packs/day: 0.00     Average packs/day: 0.5 packs/day for 12.0 years (6.0 ttl pk-yrs)     Types: Cigarettes     Start date: 2006     Quit date: 2018     Years since quittin.8    Smokeless tobacco: Never    Tobacco comments:     quit 2018   Vaping Use    Vaping status: Every Day    Substances: THC, CBD   Substance Use Topics    Alcohol use: Not Currently    Drug use: Yes     Types: Marijuana     Comment: medical marijuana last vaped 2024      E-Cigarette/Vaping    E-Cigarette Use Current Every Day User     Comments medical marijuana       E-Cigarette/Vaping Substances    Nicotine No     THC Yes     CBD Yes     Flavoring No     Other No     Unknown No       I have reviewed and agree with the history as documented.     (Paulo Mckeon) Mateus Mckeon is a 39 y.o. male     They presented to the emergency  department on December 10, 2024. Patient presents with:  Fatigue: Reports feeling unwell for 10 days, lethargy, nausea, cough. Reports CP last night that has resolved, denies SOB.   Reports had covid 2 months ago and feels like that again.  Hx Dialysis Mon, wed, fri.       The patient states that for approximately the past 10 days patient has been feeling overall fatigue as well as nausea and mild cough.  Patient notes that he experienced some chest discomfort last evening however that has resolved.  Patient notes prior to onset of his symptoms he did receive a MMR vaccine.  Patient states that he had COVID approximately 2 months ago and his symptoms currently feel similar.  Patient is on dialysis Monday Wednesday Friday, and has not missed any treatments.  Patient does not make urine, notes that he did have an episode of diarrhea yesterday, and has many nieces and nephews.  Patient denies current chest pain, fever, chills, abdominal pain, vomiting, or any other complaint at this time.              Review of Systems   Constitutional:  Positive for fatigue. Negative for chills and fever.   HENT:  Negative for ear pain and sore throat.    Eyes:  Negative for pain and visual disturbance.   Respiratory:  Negative for cough and shortness of breath.    Cardiovascular:  Positive for chest pain (Resolved). Negative for palpitations.   Gastrointestinal:  Positive for diarrhea and nausea. Negative for abdominal pain and vomiting.   Genitourinary:  Negative for dysuria and hematuria.   Musculoskeletal:  Negative for arthralgias and back pain.   Skin:  Negative for color change and rash.   Neurological:  Negative for seizures and syncope.   All other systems reviewed and are negative.          Objective       ED Triage Vitals [12/09/24 2153]   Temperature Pulse Blood Pressure Respirations SpO2 Patient Position - Orthostatic VS   98.9 °F (37.2 °C) 83 164/88 18 97 % Sitting      Temp Source Heart Rate Source BP Location FiO2 (%)  Pain Score    Oral Monitor Right arm -- --      Vitals      Date and Time Temp Pulse SpO2 Resp BP Pain Score FACES Pain Rating User   12/09/24 2153 98.9 °F (37.2 °C) 83 97 % 18 164/88 -- -- EV            Physical Exam  Vitals and nursing note reviewed.   Constitutional:       General: He is not in acute distress.     Appearance: Normal appearance.   HENT:      Head: Normocephalic and atraumatic.      Right Ear: External ear normal.      Left Ear: External ear normal.      Nose: Nose normal.      Mouth/Throat:      Mouth: Mucous membranes are moist.   Eyes:      Conjunctiva/sclera: Conjunctivae normal.   Cardiovascular:      Rate and Rhythm: Normal rate and regular rhythm.   Pulmonary:      Effort: Pulmonary effort is normal. No respiratory distress.      Breath sounds: Normal breath sounds.   Abdominal:      General: Abdomen is flat. Bowel sounds are normal.      Tenderness: There is no abdominal tenderness. There is no guarding or rebound.   Musculoskeletal:         General: Normal range of motion.      Cervical back: Normal range of motion.   Skin:     General: Skin is warm and dry.      Capillary Refill: Capillary refill takes less than 2 seconds.   Neurological:      Mental Status: He is alert. Mental status is at baseline.   Psychiatric:         Mood and Affect: Mood normal.         Results Reviewed       Procedure Component Value Units Date/Time    HS Troponin I 2hr [[id number]]  (Normal) Collected: 12/10/24 0000    Lab Status: Final result Specimen: Blood from Arm, Right Updated: 12/10/24 0033     hs TnI 2hr 16 ng/L      Delta 2hr hsTnI -1 ng/L     Magnesium [[id number]]  (Normal) Collected: 12/09/24 2157    Lab Status: Final result Specimen: Blood from Arm, Right Updated: 12/09/24 2259     Magnesium 2.4 mg/dL     FLU/COVID Rapid Antigen (30 min. TAT) - Preferred screening test in ED [[id number]]  (Normal) Collected: 12/09/24 2227    Lab Status: Final result Specimen: Nares from Nose Updated: 12/09/24 2253      SARS COV Rapid Antigen Negative     Influenza A Rapid Antigen Negative     Influenza B Rapid Antigen Negative    Narrative:      This test has been performed using the [email or web address] Jodie 2 FLU+SARS Antigen test under the Emergency Use Authorization (EUA). This test has been validated by the  and verified by the performing laboratory. The Jodie uses lateral flow immunofluorescent sandwich assay to detect SARS-COV, Influenza A and Influenza B Antigen.     The [employer] Jodie 2 SARS Antigen test does not differentiate between SARS-CoV and SARS-CoV-2.     Negative results are presumptive and may be confirmed with a molecular assay, if necessary, for patient management. Negative results do not rule out SARS-CoV-2 or influenza infection and should not be used as the sole basis for treatment or patient management decisions. A negative test result may occur if the level of antigen in a sample is below the limit of detection of this test.     Positive results are indicative of the presence of viral antigens, but do not rule out bacterial infection or co-infection with other viruses.     All test results should be used as an adjunct to clinical observations and other information available to the provider.    FOR PEDIATRIC PATIENTS - copy/paste COVID Guidelines URL to browser: [email or web address]    HS Troponin 0hr (reflex protocol) [[id number]]  (Normal) Collected: 12/09/24 2157    Lab Status: Final result Specimen: Blood from Arm, Right Updated: 12/09/24 2229     hs TnI 0hr 17 ng/L     CBC and differential [[id number]]  (Abnormal) Collected: 12/09/24 2157    Lab Status: Final result Specimen: Blood from Arm, Right Updated: 12/09/24 2228     WBC 5.00 Thousand/uL      RBC 3.10 Million/uL      Hemoglobin 10.1 g/dL      Hematocrit 30.5 %      MCV 98 fL      MCH 32.6 pg      MCHC 33.1 g/dL      RDW 13.3 %      MPV 10.6 fL      Platelets 274 Thousands/uL      nRBC 0 /100 WBCs       Segmented % 59 %      Immature Grans % 0 %      Lymphocytes % 27 %      Monocytes % 6 %      Eosinophils Relative 7 %      Basophils Relative 1 %      Absolute Neutrophils 2.88 Thousands/µL      Absolute Immature Grans 0.02 Thousand/uL      Absolute Lymphocytes 1.36 Thousands/µL      Absolute Monocytes 0.32 Thousand/µL      Eosinophils Absolute 0.36 Thousand/µL      Basophils Absolute 0.06 Thousands/µL     Comprehensive metabolic panel [[id number]]  (Abnormal) Collected: 12/09/24 2157    Lab Status: Final result Specimen: Blood from Arm, Right Updated: 12/09/24 2221     Sodium 142 mmol/L      Potassium 4.0 mmol/L      Chloride 96 mmol/L      CO2 38 mmol/L      ANION GAP 8 mmol/L      BUN 17 mg/dL      Creatinine 7.36 mg/dL      Glucose 143 mg/dL      Calcium 8.8 mg/dL      AST 17 U/L      ALT 12 U/L      Alkaline Phosphatase 114 U/L      Total Protein 6.9 g/dL      Albumin 4.4 g/dL      Total Bilirubin 0.58 mg/dL      eGFR 8 ml/min/1.73sq m     Narrative:      National Kidney Disease Foundation guidelines for Chronic Kidney Disease (CKD):     Stage 1 with normal or high GFR (GFR > 90 mL/min/1.73 square meters)    Stage 2 Mild CKD (GFR = 60-89 mL/min/1.73 square meters)    Stage 3A Moderate CKD (GFR = 45-59 mL/min/1.73 square meters)    Stage 3B Moderate CKD (GFR = 30-44 mL/min/1.73 square meters)    Stage 4 Severe CKD (GFR = 15-29 mL/min/1.73 square meters)    Stage 5 End Stage CKD (GFR <15 mL/min/1.73 square meters)  Note: GFR calculation is accurate only with a steady state creatinine            XR chest 2 views   ED Interpretation by Holden Whiteside MD (12/09 [id number])   Per my independent interpretation: No acute cardiopulmonary process          ECG 12 Lead Documentation Only    Date/Time: 12/10/2024 2:12 AM    Performed by: Noah Fairchild MD  Authorized by: Noah Fairchild MD    Indications / Diagnosis:  Fatigue  Patient location:  ED  Previous ECG:     Previous ECG:  Compared to current     Comparison ECG info:  QTc has prolonged, T waves have inverted in V5 and V6    Similarity:  Changes noted  Interpretation:     Interpretation: abnormal    Rate:     ECG rate:  81    ECG rate assessment: normal    Rhythm:     Rhythm: sinus rhythm    Ectopy:     Ectopy: none    QRS:     QRS axis:  Normal    QRS intervals:  Normal  Conduction:     Conduction: normal    ST segments:     ST segments:  Normal  T waves:     T waves: inverted      Inverted:  V5 and V6  Other findings:     Other findings: prolonged qTc interval    Comments:      Normal sinus rhythm at 81 bpm, no PAC, no PVC, QTc prolonged at 483 ms, T wave inversion in V5 and V6, no acute ST elevation or depression.  POC Cardiac US    Date/Time: 12/10/2024 2:18 AM    Performed by: Noah Fairchild MD  Authorized by: Noah Fairchild MD    Patient location:  ED  Other Assisting Provider: Yes (comment)    Procedure details:     Exam Type:  Diagnostic and educational    Indications: chest pain      Assessment / Evaluation for: cardiac function and pericardial effusion      Exam Type: initial exam      Image quality: diagnostic      Image availability:  Video obtained  Patient Details:     Cardiac Rhythm:  Regular    Mechanical ventilation: No    Cardiac findings:     Echo technique: limited 2D      Views obtained: parasternal long axis, parasternal short axis and subcostal      Pericardial effusion: absent      Tamponade physiology: absent      Wall motion: normal      LV systolic function: normal      RV dilation: none        ED Medication and Procedure Management   Prior to Admission Medications   Prescriptions Last Dose Informant Patient Reported? Taking?   GLUCAGON EMERGENCY 1 MG injection  Self Yes No   Gvoke HypoPen 1-Pack 1 MG/0.2ML SOAJ  Self Yes No   Sig: as needed   Insulin Disposable Pump (Omnipod 5 G6 Intro, Gen 5,) KIT  Self Yes No   Insulin Disposable Pump (Omnipod 5 G6 Pod, Gen 5,) MISC  Self Yes No   NIFEdipine (PROCARDIA XL) 90 mg 24 hr  tablet  Self Yes No   Sig: Take 90 mg by mouth daily   NovoLOG 100 UNIT/ML injection  Self Yes No   Patient not taking: Reported on 11/19/2024   Patiromer Sorbitex Calcium (VELTASSA PO)  Self Yes No   Sig: Take 5 g by mouth once a week   SPS 15 GM/60ML suspension  Self Yes No   Sig: TAKE 60 ML BY MOUTH SINGLE DOSE FOR EMERGENCY USE DURING MISSED HEMODIALYSIS   aspirin (ECOTRIN LOW STRENGTH) 81 mg EC tablet  Self Yes No   Sig: Take 81 mg by mouth daily   atorvastatin (LIPITOR) 40 mg tablet  Self No No   Sig: TAKE 1 TABLET BY MOUTH ONCE DAILY IN THE EVENING   b complex vitamins capsule  Self Yes No   Sig: Take 1 capsule by mouth daily before lunch    calcitriol (ROCALTROL) 0.25 mcg capsule   No No   Sig: Take 3 capsules (0.75 mcg total) by mouth 3 (three) times a week   Patient not taking: Reported on 11/19/2024   calcium acetate (PHOSLO) capsule  Self No No   Sig: Take 1 capsule (667 mg total) by mouth 3 (three) times a day   cinacalcet (SENSIPAR) 60 MG tablet  Self No No   Sig: Take 1 tablet (60 mg total) by mouth daily   escitalopram (LEXAPRO) 20 mg tablet   No No   Sig: Take 1 tablet by mouth once daily   famotidine (PEPCID) 40 MG tablet   No No   Sig: TAKE 1 TABLET BY MOUTH IN THE MORNING   gabapentin (NEURONTIN) 100 mg capsule   No No   Sig: TAKE 1 CAPSULE BY MOUTH IN THE MORNING AND 2 AT BEDTIME   hydrALAZINE (APRESOLINE) 50 mg tablet   No No   Sig: Take 1 tablet (50 mg total) by mouth every 8 (eight) hours Holding hydralazine if your systolic blood pressure <150   hydrOXYzine HCL (ATARAX) 10 mg tablet  Self No No   Sig: Take 1 tablet (10 mg total) by mouth every 6 (six) hours as needed for itching or anxiety   labetalol (NORMODYNE) 200 mg tablet  Self No No   Sig: Take 2 tablets (400 mg total) by mouth 3 (three) times a day   Patient taking differently: Take 400 mg by mouth 3 (three) times a day 200mg in AM, 400mg at lunch and dinner   olmesartan (BENICAR) 40 mg tablet  Self Yes No   Sig: Take 40 mg by mouth  daily   ondansetron (ZOFRAN) 4 mg tablet  Self No No   Sig: Take 1 tablet (4 mg total) by mouth every 8 (eight) hours as needed for nausea or vomiting   traZODone (DESYREL) 50 mg tablet   No No   Sig: TAKE 1/2 (ONE-HALF) TABLET BY MOUTH ONCE DAILY AT BEDTIME AS NEEDED FOR SLEEP      Facility-Administered Medications: None     Discharge Medication List as of 12/10/2024 12:40 AM        START taking these medications    Details   hyoscyamine (ANASPAZ,LEVSIN) 0.125 MG tablet Take 1 tablet (0.125 mg total) by mouth every 4 (four) hours as needed for cramping, Starting Tue 12/10/2024, Normal      !! ondansetron (ZOFRAN) 4 mg tablet Take 1 tablet (4 mg total) by mouth every 6 (six) hours, Starting Tue 12/10/2024, Normal       !! - Potential duplicate medications found. Please discuss with provider.        CONTINUE these medications which have NOT CHANGED    Details   aspirin (ECOTRIN LOW STRENGTH) 81 mg EC tablet Take 81 mg by mouth daily, Historical Med      atorvastatin (LIPITOR) 40 mg tablet TAKE 1 TABLET BY MOUTH ONCE DAILY IN THE EVENING, Starting Tue 9/10/2024, Normal      b complex vitamins capsule Take 1 capsule by mouth daily before lunch , Historical Med      calcitriol (ROCALTROL) 0.25 mcg capsule Take 3 capsules (0.75 mcg total) by mouth 3 (three) times a week, Starting Mon 11/11/2024, Normal      calcium acetate (PHOSLO) capsule Take 1 capsule (667 mg total) by mouth 3 (three) times a day, Starting Tue 1/23/2024, Normal      cinacalcet (SENSIPAR) 60 MG tablet Take 1 tablet (60 mg total) by mouth daily, Starting Tue 1/23/2024, Normal      escitalopram (LEXAPRO) 20 mg tablet Take 1 tablet by mouth once daily, Starting Mon 10/7/2024, Normal      famotidine (PEPCID) 40 MG tablet TAKE 1 TABLET BY MOUTH IN THE MORNING, Starting Thu 11/21/2024, Normal      gabapentin (NEURONTIN) 100 mg capsule TAKE 1 CAPSULE BY MOUTH IN THE MORNING AND 2 AT BEDTIME, Normal      GLUCAGON EMERGENCY 1 MG injection Historical Med       Gvoke HypoPen 1-Pack 1 MG/0.2ML SOAJ as needed, Starting Wed 8/10/2022, Historical Med      hydrALAZINE (APRESOLINE) 50 mg tablet Take 1 tablet (50 mg total) by mouth every 8 (eight) hours Holding hydralazine if your systolic blood pressure <150, Starting Sun 11/10/2024, Until Tue 12/10/2024, Normal      hydrOXYzine HCL (ATARAX) 10 mg tablet Take 1 tablet (10 mg total) by mouth every 6 (six) hours as needed for itching or anxiety, Starting Wed 12/7/2022, Normal      Insulin Disposable Pump (Omnipod 5 G6 Intro, Gen 5,) KIT Starting Tue 1/17/2023, Historical Med      Insulin Disposable Pump (Omnipod 5 G6 Pod, Gen 5,) MISC Starting Fri 3/24/2023, Historical Med      labetalol (NORMODYNE) 200 mg tablet Take 2 tablets (400 mg total) by mouth 3 (three) times a day, Starting Sat 1/27/2024, Normal      NIFEdipine (PROCARDIA XL) 90 mg 24 hr tablet Take 90 mg by mouth daily, Historical Med      NovoLOG 100 UNIT/ML injection Historical Med      olmesartan (BENICAR) 40 mg tablet Take 40 mg by mouth daily, Historical Med      !! ondansetron (ZOFRAN) 4 mg tablet Take 1 tablet (4 mg total) by mouth every 8 (eight) hours as needed for nausea or vomiting, Starting Fri 6/21/2024, Normal      Patiromer Sorbitex Calcium (VELTASSA PO) Take 5 g by mouth once a week, Historical Med      SPS 15 GM/60ML suspension TAKE 60 ML BY MOUTH SINGLE DOSE FOR EMERGENCY USE DURING MISSED HEMODIALYSIS, Historical Med      traZODone (DESYREL) 50 mg tablet TAKE 1/2 (ONE-HALF) TABLET BY MOUTH ONCE DAILY AT BEDTIME AS NEEDED FOR SLEEP, Normal       !! - Potential duplicate medications found. Please discuss with provider.          ED SEPSIS DOCUMENTATION   Time reflects when diagnosis was documented in both MDM as applicable and the Disposition within this note       Time User Action Codes Description Comment    12/10/2024 12:37 AM Noah Fairchild [K52.9] Gastroenteritis     12/10/2024 12:37 AM Noah Fairchild [R53.83] Fatigue      12/10/2024  1:32 AM Holden Whiteside Modify [K52.9] Gastroenteritis     12/10/2024  1:32 AM Holden Whiteside Modify [R53.83] Fatigue     12/10/2024  1:32 AM Holden Whiteside Add [R03.0] Elevated blood pressure reading     12/10/2024  1:32 AM Holden Whiteside Add [R11.2,  R19.7] Nausea vomiting and diarrhea     12/10/2024  1:32 AM Holden Whiteside Add [R07.9] Acute chest pain                  Noah Fairchild MD  12/10/24 0224     No

## 2024-12-10 NOTE — ED ATTENDING ATTESTATION
12/9/2024  IHolden MD, saw and evaluated the patient. I have discussed the patient with the resident/non-physician practitioner and agree with the resident's/non-physician practitioner's findings, Plan of Care, and MDM as documented in the resident's/non-physician practitioner's note, except where noted. All available labs and Radiology studies were reviewed.  I was present for key portions of any procedure(s) performed by the resident/non-physician practitioner and I was immediately available to provide assistance.       At this point I agree with the current assessment done in the Emergency Department.  I have conducted an independent evaluation of this patient a history and physical is as follows:    History    Patient is a 39-year-old male, with a history significant for ESRD on dialysis per my review the medical record and anuric per patient as well as CAD, who presents to the ED today for evaluation of generalized weakness/fatigue.  There is associated nausea, nonbloody vomiting, nonbloody diarrhea (no history of A-fib, recent antibiotics/hospitalization/travel).  Patient states his symptoms began 10 days ago after he received the MMR vaccine.  They began gradually and have been constant and steady since.  Patient also describes an episode of chest pain, characterizes heaviness that was nonradiating/nonexertional/pleuritic that has since resolved.  Patient denies hemoptysis, hormonal medication, recent trauma or surgery, history of VTE.  Patient took Zofran to try and remit his symptoms.  Patient's father, present in room and finding collateral history, states patient is not confused.    Patient is without other concerns at this time.     ROS  Patient denies: Fever; dysphagia; vision change; chest pain; dyspnea; abdominal pain; polyuria; dysuria; rash; weakness; numbness; difficulty walking; confusion    Physical Exam    GENERAL APPEARANCE: NAD  NEURO: Patient is speaking clearly in  complete sentences.  Patient is answering appropriately and able follow commands.  Patient is moving all four extremities spontaneously.  No facial droop.  Tongue midline.  HEENT: PERRL, Moist mucous membranes, external ears normal, nose normal  Neck: No cervical adenopathy  CV: RRR.  Murmur present.  Known to patient.  LUNGS: Clear to auscultation: No wheezes, stridor, rhonchi, rales  GI: Abdomen non-distended. Soft. Non-tender and without rebound or guarding.  No CVA tenderness.  Negative Baum sign, no tenderness over McBurney point  : Deferred at this time  MSK: No deformity.  No lower extremity edema.  No pain with calf squeeze.  Skin: Warm and dry  Capillary refill: <2 seconds    Patient is currently afebrile and hemodynamically stable    Assessment/Plan/MDM  Nausea, vomiting, diarrhea, fatigue, chest pain  -This presentation is concerning for: Electrolyte abnormality, ACS, viral syndrome, pericarditis, myocarditis.  No reason to suspect dissection or PE based upon history/physical exam/negative PERC and Wells score  -Will investigate with viral testing, cardiac workup  -Will manage based on workup    ED Course  ED Course as of 12/10/24 0202   Mon Dec 09, 2024   2312 ECG per my independent interpretation: Normal rate, regular rhythm, no ectopy, normal axis, no ST elevations or depressions     e Dec 10, 2024   0037 Point-of-care ultrasound, performed by resident with results reviewed by me, notable for no pericardial effusion.  Doubt pericarditis         Critical Care Time  Procedures

## 2024-12-10 NOTE — DISCHARGE INSTRUCTIONS
Please follow up with your primary care provider as well as your cardiologist concerning your visit today within the next 1-3 days.  Please return to the Emergency Department for any other concerns or worsening symptoms.

## 2024-12-11 ENCOUNTER — VBI (OUTPATIENT)
Age: 39
End: 2024-12-11

## 2024-12-11 DIAGNOSIS — Z71.89 COMPLEX CARE COORDINATION: Primary | ICD-10-CM

## 2024-12-11 NOTE — TELEPHONE ENCOUNTER
12/11/24 10:28 AM    Patient contacted post ED visit, [facility] department spoke with patient/caregiver and outreach was successful.    Thank you.  Tisha Montanez MA  [facility]

## 2024-12-12 ENCOUNTER — TELEPHONE (OUTPATIENT)
Age: 39
End: 2024-12-12

## 2024-12-12 ENCOUNTER — APPOINTMENT (OUTPATIENT)
Facility: CLINIC | Age: 39
End: 2024-12-12
Payer: MEDICARE

## 2024-12-12 ENCOUNTER — PATIENT OUTREACH (OUTPATIENT)
Dept: CASE MANAGEMENT | Facility: OTHER | Age: 39
End: 2024-12-12

## 2024-12-12 NOTE — TELEPHONE ENCOUNTER
Received a call from Suzan at [facility] pt is on waitlist for a kidney transplant.     Pt has office visit was 10/1/2024 with Dr Middleton    Needs cardiac  clearance prior to get a scheduled date for kidney transplant    Fax for Suzan [facility] [phone]  Tele number [phone]    Please advise/review

## 2024-12-16 ENCOUNTER — TELEPHONE (OUTPATIENT)
Dept: NEUROLOGY | Facility: CLINIC | Age: 39
End: 2024-12-16

## 2024-12-16 NOTE — TELEPHONE ENCOUNTER
Called and left VM for pt - offering sooner appt with Dr Morse instead of 1/23 HFU with Dr Graves. Provided call back number.  If pt calls back, please offer open slots for 12/17,12/18, or 12/19

## 2024-12-17 ENCOUNTER — PATIENT OUTREACH (OUTPATIENT)
Dept: CASE MANAGEMENT | Facility: OTHER | Age: 39
End: 2024-12-17

## 2024-12-17 ENCOUNTER — APPOINTMENT (OUTPATIENT)
Facility: CLINIC | Age: 39
End: 2024-12-17
Payer: MEDICARE

## 2024-12-17 ENCOUNTER — EVALUATION (OUTPATIENT)
Facility: CLINIC | Age: 39
End: 2024-12-17
Payer: MEDICARE

## 2024-12-17 ENCOUNTER — TELEPHONE (OUTPATIENT)
Dept: CARDIOLOGY CLINIC | Facility: CLINIC | Age: 39
End: 2024-12-17

## 2024-12-17 DIAGNOSIS — I63.89 CEREBROVASCULAR ACCIDENT (CVA) DUE TO OTHER MECHANISM (HCC): Primary | ICD-10-CM

## 2024-12-17 DIAGNOSIS — R53.1 GENERALIZED WEAKNESS: ICD-10-CM

## 2024-12-17 DIAGNOSIS — Z99.2 END STAGE RENAL DISEASE ON DIALYSIS (HCC): ICD-10-CM

## 2024-12-17 DIAGNOSIS — N18.6 END STAGE RENAL DISEASE ON DIALYSIS (HCC): ICD-10-CM

## 2024-12-17 PROCEDURE — 97530 THERAPEUTIC ACTIVITIES: CPT

## 2024-12-17 PROCEDURE — 97110 THERAPEUTIC EXERCISES: CPT

## 2024-12-17 NOTE — PROGRESS NOTES
Returned Francine's phone call. Paulo is back to himself. Took about 48 hours for him to feel better. They were happy with care at emergency room. No care management needs at this time.   Paulo attends medical appointments

## 2024-12-17 NOTE — PROGRESS NOTES
"OCCUPATIONAL THERAPY RE-EVALUATION & RE-CERTIFICATION    Today's Date: 2024  Patient Name: Mateus Mckeon  : 1985  MRN: [id number]  Referring Provider: Dania Sher DO  Dx: Cerebrovascular accident (CVA) due to other mechanism (HCC) [I63.89]      SKILLED ANALYSIS:  Pt presents to re-evaluation on 24 s/p 10 months of OT treatmemt with a history of CVA and recent hospitalization due to an illness. Based on review of the EMR, there was no significant findings. He went to the hospital due to report of fatigue and \"COVID-19 sxs.\" Pt overall states he feels weaker and has more fatigue since his hospitalization. Pts UE function was evaluated thru MMT, dynamometer, pinch gauge, FDT, and 9-hole peg test, and trail making A and B were completed. Based on assessments, pt is demonstrating slight decline with R  strength, however increased R pinch strength. He improved his time on the 9 hole peg test and FDT indicating improvement with FMC/dexterity. His R shoulder ER strength has improved slightly, all other planes of motion for RUE have remained the same as measured through MMT. Sutter making A and B times have improved and he has less errors with trail making B, indicating improvements in attention and problem solving. Pt continues to function below normal limits in the following domains: R UE strength, endurance, FMC R>L, dexterity R>L, R grasp strength, higher level functional cognition. Pts goals were upgraded for lateral pinch, tripod pinch, trail making B, FDT and 9 hole peg test for new POC. His goals for MMT and tip pinch were maintained, and  strength goals were downgraded for new POC. Recommend continued participation in OP OT 2x/wk for an additional 8-12 weeks with continued focus on aforementioned deficits to maximize functional performance and QOL.  Discussed progress and recommendations with pt and he is in agreement.    Subjective:   : \"I went to the hospital because I " "feel like I have long COVLottie REDDY been so fatigue and sick. They did not give me answers.\"    5/23: Pt states he feels like his endurance is not where it needs to be. He has been attempting to use his hand as much as he can, however does not see much progress - if any thing he states \"I feel like I'm losing ground\". Pt states he did start using the theraputty and continued using foam to work on hand strength. Pt states he is not able to keep up with her exercises on days that he has dialysis due to fatigue.     7/11: Reports inc endurance during daily activities, but still fatigues quicker than baseline.  Able to complete zippers and buttons with inc time, requires assist for tying shoes.  Now able to write his initials and complete basic handwriting with ~80% legibility, significantly inc time and inc sizing    8/21: Pt states he is progressing, he feels like the use of his R hand has come a long way. Pt states he is better able to use his R hand, and is now able to tie his shoes.     9/25: Pt reports he feels like he is continuing to improve, however his R hand is still not where it was. Pt states he has been wearing his night resting hand splint less as he feels like his R hand is not as tight.     11/12: Pt states on 11/5 he sent to the ED as his BP was high and he was having difficulty forming sentences. As per EMR review, there was no acute infarct.     PLAN OF CARE START: 12/17/2024  PLAN OF CARE END: 3/17/2025  FREQUENCY: 2x/wk  PRECAUTIONS: Renal failure, actively going through dialysis; type 1 diabetes; HTN; SAH; seizure (last one was 2019)    Subjective    Mechanism of Injury  12/17/2024: From EMR: \"...presents with overall fatigue. Differential diagnosis includes but is not limited to gastroenteritis, COVID-19, influenza, pneumonia, electrolyte derangement, less likely ACS, less likely pericarditis/myocarditis given current absence of chest pain. Obtain laboratory analysis without acute pertinent " "findings. EKG as interpreted by myself as below. Chest x-ray without acute cardiopulmonary disease. POCUS obtained showing no pericardial effusion, and overall global function within normal limits.\"    11/12/24: \"Hypertensive emergency: Patient presented with blood pressure up to 200s, with word finding difficulty, with his history of CVAs, was admitted to ICU with Cardene drip and admitted to rule out stroke. Was treated with loading dose of aspirin and Plavix and maintained on daily dosing. Subsequently underwent CT head CTA which were negative. Underwent MRI brain which was negative for acute infarct. Hence Plavix can be discontinued at this time. Suspect all his symptoms which are secondary to high blood pressure temporarily affecting cerebral perfusion. He was weaned off Cardene drip and maintained on oral home medications yesterday in ICU and then downgraded as blood pressures were better controlled. According to the mother he gets symptomatic with SBP is below 120. Has BP of 140 is a good medium for him. It is unclear what acutely changes blood pressure hence his blood pressure was well-controlled for long period of time with the home regimen of medications. Only thing new was calcium and zinc supplementation because he was recently diagnosed with COVID 3 weeks ago\"    Paulo is a 38-year-old male with history of end-stage renal disease on dialysis, hypertension, type 1 diabetes, and recent subarachnoid hemorrhage   Pt reports he went to the hospital and they found a brain bleed and reported he needed an angioplasty. Unfortunately, during the angioplasty he suffered a stroke that affected his R UE. Since his stroke he reports his R UE function has improved although he continues with R hand FMC/dexterity deficits.   Overall, pt requires assistance with 75% of his daily activities due to complex medical presentation as well as increased fatigue post-stroke.   \"There's nothing more like purgatory than using your " "non dominant side.\"    From Hospital note on 1/14/24:   38-year-old man with prior history of cerebellar and pontine strokes, prothrombin gene mutation, MTHFR gene mutation, diabetes, diabetic nephropathy with ESRD on HD, who presented with headache on 1/5 and was found to have subarachnoid hemorrhage in the basilar cisterns.  Unclear etiology.  Patient family reports nausea and retching around the time of headache.     - Underwent conventional angiogram later that day which was unremarkable for any clear source.  - MRI brain on 1/8 demonstrated multiple punctate foci of ischemia in the right MCA, bilateral cerebellar hemispheres, and right velia which may represent sequela of angiograph versus embolic strokes of unclear source (ESUS)   -Repeat angiogram on 1/12 was again unremarkable for source of subarachnoid.     Neurology consulted for right upper extremity weakness noted after second angiogram.  - Repeat MRI demonstrated numerous new embolic appearing foci of ischemia with similar suspected etiology as those mentioned above.     Transcranial Dopplers have been unremarkable with Lindegaard ratios consistently less than 3.     Spontaneous basilar cistern subarachnoid hemorrhage of yet unclear etiology.      Embolic appearing strokes, suspect complication of angiogram however cannot exclude additional embolic source.    Occupational Profile  Pt lives in a home with his parents; has one flight to bedroom and has AD throughout the house (grab bars etc.). Bill reports he needs help with preparing meals (could get by making 1 meal for himself but not all day), laundry, and driving. He is independent with dressing and bathing, \"but it takes it out of me. I could do some things but I need help throughout the day.\"    It is of note that the pt is actively getting dialysis; has to sit still for 4 hours  Pt reports some numbness in fingers on left side (L sided forearm is where dialysis is put in) and notes difficulty with " "thinking and memory since stroke. No changes in vision     He enjoys spending time with nieces and nephews and would love to get a dog.    PATIENT GOAL: \"I want to be able to tie my shoes without having to ask my parents\"    HISTORY OF PRESENT ILLNESS:     PMH:   Past Medical History:   Diagnosis Date    Acute kidney injury (HCC)     Ambulates with cane     Anuria     Anxiety     Cellulitis of right elbow 03/31/2021    Chronic kidney disease     COVID-19 10/18/2024    Depression     Diabetes mellitus (HCC)     Diarrhea     Emesis 10/24/2020    End stage renal disease (HCC) 02/11/2018    Formatting of this note might be different from the original. Last Assessment & Plan:  Secondary to DM.  On nightly PD.  Followed by [facility].  Patient considering transplant for kidney and pancreas through [facility] Formatting of this note might be different from the original. Last Assessment & Plan:  Formatting of this note might be different from the original. 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    Esophagitis 07/21/2015    Falls     Gastroparesis     GERD (gastroesophageal reflux disease)     History of shingles 2010    History of transfusion 02/2018    no adverse reaction    Hyperlipidemia     Hyperphosphatemia     Hypertension     Hypoglycemia 07/15/2022    Itching     Mastoiditis of right side 07/15/2022    Muscle weakness     general unsteadiness    Obesity (BMI 30.0-34.9) 09/09/2019    Orthostatic hypotension 10/25/2020    Peripheral polyneuropathy 11/20/2019    PONV (postoperative nausea and vomiting) 01/26/2018    Protein-calorie malnutrition (HCC) 11/23/2020    Recurrent peritonitis (HCC) due to peritoneal dialysis catheter 07/31/2020    Retinopathy     Seizures (HCC)     early 2020 - one time    Skin abnormality     some dime size areas where skin was scratched from itching    Sleep apnea     Spontaneous bacterial peritonitis (HCC) 10/19/2020    Squamous cell skin cancer     left " temple    Stroke (HCC)     x2 - off balance/no driving/fatigue    Swelling of both lower extremities     Swelling of joint of upper arm, right 04/03/2024    Traumatic onycholysis 07/21/2022    Vomiting     Wears glasses     Word finding difficulty 11/05/2024       Past Surgical Hx:   Past Surgical History:   Procedure Laterality Date    CARDIAC ELECTROPHYSIOLOGY PROCEDURE N/A 9/21/2023    Procedure: Cardiac loop recorder explant;  Surgeon: Parish Morgan MD;  Location: [facility];  Service: Cardiology    CARDIAC LOOP RECORDER  05/2018    COLONOSCOPY      EGD      EYE SURGERY Right     HEMODIALYSIS ADULT  11/6/2024    IR AV FISTULAGRAM/GRAFTOGRAM  02/23/2021    IR CEREBRAL ANGIOGRAPHY  1/12/2024    IR CEREBRAL ANGIOGRAPHY / INTERVENTION  1/5/2024    IR TUNNELED CENTRAL LINE PLACEMENT  02/16/2021    IR TUNNELED DIALYSIS CATHETER PLACEMENT  11/18/2020    IR TUNNELED DIALYSIS CATHETER REMOVAL  02/12/2021    IR TUNNELED DIALYSIS CATHETER REMOVAL  03/11/2021    MOHS SURGERY Left 12/14/2022    Left temple with Dr. Hassan    PERITONEAL CATHETER INSERTION N/A 08/27/2018    Procedure: UNROOF PD CATHETER;  Surgeon: Felipe Lindo DO;  Location: [facility];  Service: General    KY ARTERIOVENOUS ANASTOMOSIS OPEN DIRECT Left 11/09/2020    Procedure: CREATION FISTULA  ARTERIOVENOUS (AV) - LEFT WRIST;  Surgeon: Placido Altamirano MD;  Location: [facility];  Service: Vascular    KY ESOPHAGOGASTRODUODENOSCOPY TRANSORAL DIAGNOSTIC N/A 04/18/2019    Procedure: ESOPHAGOGASTRODUODENOSCOPY (EGD);  Surgeon: Ale Figueroa MD;  Location: [facility];  Service: Gastroenterology    KY LAPS INSERTION TUNNELED INTRAPERITONEAL CATHETER N/A 08/06/2018    Procedure: LAPAROSCOPIC PD CATHETER PLACEMENT;  Surgeon: Felipe Lindo DO;  Location[facility];  Service: General    KY REMOVAL TUNNELED INTRAPERITONEAL CATHETER N/A 11/18/2020    Procedure: REMOVAL CATHETER PERITONEAL DIALYSIS;  Surgeon: Abdifatah Ty MD;  Location: [facility];  Service:  General    TONSILLECTOMY      UPPER GASTROINTESTINAL ENDOSCOPY          Pain: FLACC 0     Objective    Upper Extremities:  Pt is right hand dominant    Range of Motion:  AROM:   R UE - not reassessed 12/17  - Shoulder flexion: 160* - WFL  -Shoulder abduction: 180* - WNL  - Shoulder extension: WNL  - Elbow flexion WNL  - Elbow extension: WNL  - Wrist flexion: WNL  - Wrist extension: WNL  - Pronation: WNL  - Supination: 95% AROM  - Finger extension: WNL  - Finger flexion: WNL    L UE : 100%     Manual Muscle Testing:  R UE:  - Shoulder flexors: 5/5  - Shoulder extensors: 5/5  - Shoulder abductors: 4+/5   - Shoulder external rotators: 4+/5 IMPROVED   - Shoulder internal rotators: 5/5  - Elbow flexors: 5/5  - Elbow extensors: 4/5  - Wrist flexors: 4+/5  - Wrist extensors: 4+/5  L UE:  - Shoulder flexors: 5/5  - Shoulder extensors: 5/5  - Shoulder abductors: 5/5   - Shoulder external rotators: 5/5   - Shoulder internal rotators: 5/5  - Elbow flexors: 5/5  - Elbow extensors: 5/5  - Wrist flexors: 5/5  - Wrist extensors: 5/5                  ANGEL: RUE: 62lb (previously 65lb) LUE: 100lb SLIGHT DECLINE  The age norm is approximately R: 116.8;bs and L: 112.8 lbs, indicating decreased  strength..                 2 point pinch: RUE: 8lb , LUE: 10.5lb  (age norms are 18 lbs) IMPROVED  3 point pinch: RUE: 13lb , LUE: 20lb  (age norms are 24 lbs) IMPROVED  Lateral pinch: RUE: 18lb , LUE: 21lb  (age norms are 25 lbs) IMPROVED                  NINE-HOLE PEG TEST: assesses dexterity/fine motor coordination   R hand: 47 seconds IMPROVED  L hand: 47 seconds   Pt demonstrates decreased FMC compared to norms for age/sex (L: 18.62 seconds and R: 17.71 seconds)     Functional Dexterity Test: assesses patient's ability to use the hand for daily tasks requiring a 3-jaw federico prehension between the fingers and the thumb. Completed in a zig-zag pattern with unaffected UE first. 11/12 not re-assessed  5-second penalty (each):  -Supinating  forearm to assist  -Touching the board for assist    10-second penalty:  -Dropping a peg    R UE: 2 min 38 sec + 1 drop + 1 penalty = 173 seconds (prior: 2:49 with 2 drops = 189 seconds) IMPROVED  L UE:  44.4 seconds (previously: 58.39 seconds)     Norms based on age/sex: (Lucy et. al., 2013)    Age Group Sex Non-Dominant (50th percentile) Dominant (50th percentile)   20-49 Male 23.2 24.1     Pt performance on Functional Dexterity Test implies non functional hand compared to norms for age/sex.    Score by Functional Level Range (dominant hand) Range (non-dominant hand)   Functional  16-25 seconds 18-27 seconds   Moderately Functional  26-33 seconds 28-45 seconds   Minimally Functional 34-50 seconds 46-55 seconds   Nonfunctional >50 seconds >55 seconds     Subluxation: NONE    Scapular winging: MINIMAL    FUGYL JOHNS ASSESSMENT OF MOTOR RECOVERY AFTER STROKE:  not re-assessed  R UE:  UPPER EXTREMITY SEATED: 35/36   WRIST: 7/10   HAND: 1414   COORDINATION/SPEED: 5/6   OVERALL SCORE: 61/66   (Clinical change= 5 points, severe impairment <19, and mild impairment is >50)         Functional Cognition:  Highest level of education: some college     Song Cognitive Assessment Version 8.1 (MoCA V8.1)  NOT REASSESSED  Visuospatial/executive functionin/5  Namin/3  Memory: 1st trial:  5/5, 2nd trial:  5/5  Attention/concentration: 2/2  List of letters: 1/1  Serial Seven Subtraction:  3/3 w/ 0 errors  Language/sentence repetition: 1/2  Language Fluency: 0/1 (previously: 0/1  Abstract/Correlational Thinkin/2   Delayed Recall: 4/5  Orientation:  5/6              Memory Index Score: 14/15  MoCA V1 8.3 Raw Score: 25/30 (previously: ), MIS:  13/15, indicative of no neurocognitive impairments.     MoCA Scoring              Normal: 26+              Mild Cognitive Impairment: 18-25               Moderate Cognitive Impairment: 10-17              Severe Cognitive Impairment: <10     Trail making Part A  and Part B:   Part A: 50 seconds (previously 70 seconds) IMPROVED  Part B: 1:48 with no errors (previously 1:58 with 1 self-corrected error) IMPROVED   Indicating deficits: Part A > 24.40 seconds and Part B > 50.68 seconds       Contextual Memory Test: 12/17 not re-assessed  Immediate: 8/20 (previously 10/20, initially 3/20)  Delayed:  8/20 (previously 10/20, initially 3/20)    Following re-evaluation completed gripping at 20lbs resistance to transfer foam cubes with RUE and pinching with red clothespin to transfer foam cubes with RUE to work on hand strength and coordination    GOALS:     Short Term Goals:  Pt will improve  strength by 5 lbs (up to 67 lbs) in the R hand for improved performance during ADLs and IADLs. No change  Pt will improve time on TM parts B to within 70 seconds demonstrating improved divided attention required for IADLs. Progressing  Pt will improve R UE strength to 5/5 in all planes as measured via MMT thru use of strengthening program and HEP. Progressing  Pt will demonstrate improved lateral pinch strength as evidenced by increase to 20 lbs for improved performance in ADLs/IADLs   Pt will demonstrate improved three point pinch strength as evidenced by increase to 15 lbs for improved performance in ADLs/IADLs   Pt will demonstrate improved tip pinch strength as evidenced by increase to 10 lbs for improved performance in ADLs/IADLs Progressing  Pt will demonstrate improved dexterity required for ADLs/IADLs as demonstrated by completing FDT within 165 seconds, including penalties  Pt will demonstrate improved FMC required for ADLs/IADLs as demonstrated by completing 9-hole peg test within 42 seconds    Long Term Goals (8-12 weeks)  Pt will improve  strength by 5 lbs (up to 72 lbs) in the R hand for improved performance during ADLs and IADLs.   Pt will improve time on TM parts B to within 66 seconds demonstrating improved divided attention required for IADLs.   Pt will improve R UE  strength to 5/5 in all planes as measured via MMT thru use of strengthening program and HEP.   Pt will demonstrate improved lateral pinch strength as evidenced by increase to 22 lbs for improved performance in ADLs/IADLs   Pt will demonstrate improved three point pinch strength as evidenced by increase to 17 lbs for improved performance in ADLs/IADLs   Pt will demonstrate improved tip pinch strength as evidenced by increase to 12 lbs for improved performance in ADLs/IADLs   Pt will demonstrate improved dexterity required for ADLs/IADLs as demonstrated by completing FDT within 160 seconds, including penalties  Pt will demonstrate improved FMC required for ADLs/IADLs as demonstrated by completing 9-hole peg test within 39 seconds    OTHER PLANNED THERAPY INTERVENTIONS:   Supine, seated, and in stance neuro re-ed  Tricep AG  NMES/FES  FMC/prehension  Timed Trials  Manual tx  Hand to target  Sensory re-ed  Seated functional reach: crossing midline  Supine place and hold  WBearing strategies   Closed chain activities  Open chain activities  Internal and external memory aides        Objective Measurement Tracker:    IE (2/15/24) 1st Re-eval:   (3/19/24) 2nd Re-eval:  (4/22/24) 3rd Re-eval:  9/25/24 11/12   MoCA 24/30 NOT TODAY/30 23/30 Not reassessed  25/30   TM Test A   To be completed next session Not reassessed 70 seconds Not assessed   TM Test B   To be completed next session Not reassessed 118 seconds Not assessed   CMT Immediate: 3/20, 0 confabulations  Delayed: 3/20, 0 confabulations NOT TODAY To be completed next session Not reassessed Not assessed Not assessed   Nine Hole Peg Test R hand: 3 pegs in 1 minute and 48.49 seconds   L hand: 35.14 seconds   R hand: all 9 pegs in and out in 1 minute and 36 seconds    L hand: 39.59 seconds  R hand: all 9 pegs in and out in 1 min. 31 secs. With 2 drops. L hand: 38.84 seconds. 1 min 16 seconds 52 seconds 60 seconds   Functional Dexterity Test NOT TODAY R UE: 2 minutes and 6  seconds to complete 8 with compensatory strategies including the board and 1 peg drop  L UE: 45 seconds  R UE: 3 mins. 50 secs. To complete with 7 drops/  compensations.   L UE: 58.39 secs. 258 seconds 189 seconds Not assessed    Strength R: 15lb,   L: 100lb R: 15lbs  L: 100lbs  R: 15 lbs  L: 100 lbs 35 lbs 65 lbs 65   Lateral Pinch Strength NOT TODAY  Not today   8 lbs 18 lbs 16   2 Point Pinch Strength NOT TODAY   Not today   4 lbs  6.5 lbs 7   3 Point Pinch Strength NOT TODAY   Not today  7 lbs  9 lbs 9   Manual Muscle Testing              Fugl Harrison UE: 36/36  Wrist: 10/10  Hand: 6/14  Coordination: 2/6 NOT TODAY  UE: 28/36  Wrist: 7/10  Hand: 10/14  Coordination:  3/6 UE: 36  Wrist: 7/10  Hand: 12/14  Coordination:   4/6  Overall 59/66 65/66 61/66

## 2024-12-19 ENCOUNTER — OFFICE VISIT (OUTPATIENT)
Facility: CLINIC | Age: 39
End: 2024-12-19
Payer: MEDICARE

## 2024-12-19 DIAGNOSIS — Z99.2 END STAGE RENAL DISEASE ON DIALYSIS (HCC): ICD-10-CM

## 2024-12-19 DIAGNOSIS — R53.1 GENERALIZED WEAKNESS: ICD-10-CM

## 2024-12-19 DIAGNOSIS — N18.6 END STAGE RENAL DISEASE ON DIALYSIS (HCC): ICD-10-CM

## 2024-12-19 DIAGNOSIS — I63.89 CEREBROVASCULAR ACCIDENT (CVA) DUE TO OTHER MECHANISM (HCC): Primary | ICD-10-CM

## 2024-12-19 PROCEDURE — 97530 THERAPEUTIC ACTIVITIES: CPT

## 2024-12-19 PROCEDURE — 97112 NEUROMUSCULAR REEDUCATION: CPT

## 2024-12-19 NOTE — PROGRESS NOTES
"OT Daily Note     Today's date: 2024  Patient name: Mateus Mckeon  : 1985  MRN: [id number]  Referring provider: Dania Sher DO  Dx:   Encounter Diagnosis     ICD-10-CM    1. Cerebrovascular accident (CVA) due to other mechanism (HCC)  I63.89       2. Generalized weakness  R53.1       3. End stage renal disease on dialysis (HCC)  N18.6     Z99.2           Start Time: 845  Stop Time: 930  Total time in clinic (min): 45 minutes    PLAN OF CARE START: 2024  PLAN OF CARE END: 3/17/2025  FREQUENCY: 2x/wk  PRECAUTIONS: Renal failure, actively going through dialysis; type 1 diabetes; HTN; SAH; seizure (last one was )  ON FLUID RESTRICTIONS -- DO NOT OFFER WATER    Subjective: Pt reports he is very nauseous today and \"not doing well\"    Objective: See treatment diary below    NM  - juxaciser 2x2min with 1lb wrist weight donned to work on UE coordination, motor control, endurance    TA:  -Placing mushroom pegs in foam pegboard and then placing marbles on top using red clothespin to manipulate them to work on pinch strength, sustained attention, coordination  - removing marbles with finger<>palm translation with stabilization of 3 marbles at a time to return them to container to work on FMC, in hand manipulation, dexterity  - gripper at 35lbs resistance to transfer foam cubes to contained with shoulder at 90 flexion and elbow at 0 extension to work on  strength    Assessment: Pt tolerated session fair today. Strengthening activities were held today due to nausea and instead focus was on FMC/dexterity and UE coordination. Increased rest breaks required due to Pt not feeling well. Occasional drops during in hand manipulation of marbles. Pt would benefit from continued skilled occupational therapy services to improve fine motor coordination, dexterity, strength, UE function, and functional cognition.    Plan: Continue per plan of care.       SHORT TERM GOALS ADDED 24:  Pt will " increase sustained attention by completing trail making part A in 35 seconds to complete IADLs NOT MET  Pt will increase divided attention by completing trail making part B in 1 minute 30 seconds to complete IADLs NOT MET  Pt will increase delayed recall and recall 4 /5 items on MOCA to complete IADLs GOAL MET    LONG TERM GOALS ADDED 2/20/24:  Pt will increase sustained attention by completing trail making part A in 38 seconds to complete IADLs  Pt will increase divided attention by completing trail making part B in 1 minute 10 seconds to complete IADLs  Pt will increase delayed recall and recall 5/5 items on MOCA to complete IADLs  Resume right  hand dominance to refined functional assist with all activities of daily living]

## 2024-12-23 NOTE — TELEPHONE ENCOUNTER
Patient's mom called in to schedule sooner but when offered they stated he had dialysis scheduled those days and will just keep the Jan 23rd .

## 2024-12-24 ENCOUNTER — OFFICE VISIT (OUTPATIENT)
Facility: CLINIC | Age: 39
End: 2024-12-24
Payer: MEDICARE

## 2024-12-24 ENCOUNTER — EVALUATION (OUTPATIENT)
Facility: CLINIC | Age: 39
End: 2024-12-24
Payer: MEDICARE

## 2024-12-24 DIAGNOSIS — R53.1 GENERALIZED WEAKNESS: ICD-10-CM

## 2024-12-24 DIAGNOSIS — I63.89 CEREBROVASCULAR ACCIDENT (CVA) DUE TO OTHER MECHANISM (HCC): Primary | ICD-10-CM

## 2024-12-24 DIAGNOSIS — R26.89 OTHER ABNORMALITIES OF GAIT AND MOBILITY: ICD-10-CM

## 2024-12-24 DIAGNOSIS — Z99.2 END STAGE RENAL DISEASE ON DIALYSIS (HCC): ICD-10-CM

## 2024-12-24 DIAGNOSIS — Z91.81 STATUS POST FALL: Primary | ICD-10-CM

## 2024-12-24 DIAGNOSIS — N18.6 END STAGE RENAL DISEASE ON DIALYSIS (HCC): ICD-10-CM

## 2024-12-24 DIAGNOSIS — I63.9 CEREBROVASCULAR ACCIDENT (CVA), UNSPECIFIED MECHANISM (HCC): ICD-10-CM

## 2024-12-24 PROCEDURE — 97530 THERAPEUTIC ACTIVITIES: CPT

## 2024-12-24 PROCEDURE — 97110 THERAPEUTIC EXERCISES: CPT

## 2024-12-24 PROCEDURE — 97112 NEUROMUSCULAR REEDUCATION: CPT

## 2024-12-24 NOTE — PROGRESS NOTES
PT Re-Evaluation /PROGRESS NOTE         POC expires Auth Status Total   Visits  Start date  Expiration date PT/OT + Visit Limit? Co-Insurance   2/10/25 Submitted to FD 25, AV pending pending pending SANTI Yes, $0 co-pay                                                    Today's date: 2024  Patient name: Mateus Mckeon  : 1985  MRN: [id number]  Referring provider: Dania Sher DO  Dx:   Encounter Diagnosis     ICD-10-CM    1. Status post fall  Z91.81       2. Cerebrovascular accident (CVA), unspecified mechanism (HCC)  I63.9       3. Other abnormalities of gait and mobility  R26.89                 Assessment  Assessment details: Patient is a 39 y.o. Male who is familiar to the balance center presents back following self-discharge after 24. During his hiatus he has had 2 falls and fell ill with COVID and subsequent ~5 day hospitalization. Past medical history also significant for ESRD on dialysis, and CVA's (most recent one in 2024). Patient with brief hiatus from PT services while sick. He demonstrated improvements in the following outcome measures since IE on 2024: TUG, [place], and 10 MWT, likely indicating overall improvements in safe mobility, static balance, and ambulation speed, respectively. Additionally performed the 5 x STS, FGA, and 6 MWT in order to establish baseline values in functional LE strength, dynamic balance, and cardiovascular endurance, respectively. Per cutoff scores for the 5 x STS and FGA he is classified as HIGH risk for falls. Patient additionally performed below normative values for the FTEC on foam condition of the mCTSIB, likely indicating increased visual reliance and decreased vestibular integration. Plan to continue to focus on higher level dynamic balance and progression to LRAD. He has met one short term goal at this time. Patient will continue to benefit from skilled  OPPT services in order to maximize safe functional mobility and reduce overall risk for falls.       Impairments: Abnormal coordination, Abnormal gait, Abnormal muscle tone, Abnormal or restricted ROM, Activity intolerance, Impaired balance, Impaired physical strength, Lacks appropriate HEP, Poor posture, Poor body mechanics, Pain with function, Safety issue, Weight-bearing intolerance, Abnormal movement, Difficulty understanding, Abnormal muscle firing  Understanding of Dx/Px/POC: Good  Prognosis: Good    Patient verbalized understanding of POC.    Please contact me if you have any questions or recommendations. Thank you for the referral and the opportunity to share in Mateus Mckeon's care.      Plan  Plan details: PT 2-3x/week   Patient would benefit from: Skilled PT  Planned modality interventions: Biofeedback, Cryotherapy, TENS, Thermotherapy  Planned therapy interventions: Abdominal trunk stabilization, ADL training, Balance, Balance/WB training, Breathing training, Body mechanics training, Coordination, Functional ROM exercises, Gait training, HEP, Joint Mobilization, Manual Therapy, Ma taping, Motor coordination training, Neuromuscular re-education, Patient education, Postural training, Strengthening, Stretching, Therapeutic activities, Therapeutic exercises, Therapeutic training, Transfer training, Activity modification, Work reintegration  Frequency: 2-3x/wk  Duration in weeks: 12 weeks  Plan of Care beginning date: 11/18/2024  Plan of Care expiration date: 12 weeks - 2/10/2025  Treatment plan discussed with: Patient         Goals  Short Term Goals (4 weeks):    -Patient will complete 6 MWT to assess cardiovascular endurance and improve tolerance to activity - MET  -Patient will be independent with HEP within 4 weeks - NOT MET  -Patient will be able to ambulate household distances (100ft) or greater with LRAD within 4 week - ONGOING  - Patient will improve 5xSTS score by 2.3 seconds to promote  improved LE functional strength needed for ADLs - NOT MET  -Patient will improve ABC to 80% to demonstrate increased balance confidence and reduced fall risk - NOT MET    Long Term Goals (12 weeks):  - Patient will be independent in a comprehensive home exercise program  - Patient will improve gait speed to 1.0 m/s to improve safety with community ambulation   - Patient will improve MEDRANO by 6 points to facilitate return to safe independent ambulation  - Patient will complete DGI to assess dynamic balance and safety with ambulation without AD  - Patient will improve 6 Minute Walk Test score by 190 feet to promote improved cardiovascular endurance  - Patient will report going on walks at least 3 days per week to promote independence and improved cardiovascular endurance  - Patient will report ~75% improvement in falls or near falls to promote safety in his community and independence in his household      Cut off score    All date taken from [facility] Neuro Section or Rehab Measures      [place]:  Siva et al., 2018  MDC: 2.7 pts    She et al., 2011  Cut-off score: 45/56    Chronic CVA  < 44/56 high risk for falls (Siva et al., 2018)  < 47.5/56 slow walker status (Jus et al., 2011) 5xSTS: Jean Carlos 2010  MDC: 2.3 sec  Age Norms:  60-69: 11.1 sec  70-79: 12.6 sec  80-89: 14.8 sec    Yamileth 2010, Chronic Stroke  Chronic CVA: 12 sec   TUG  Nubia rodríguez alSusan, 2005  MDC: 2.9 sec    Cut off score:  >13.5 sec community dwelling adults  >32.2 frail elderly  <20 I for basic transfers  >30 dependent on transfers 10 Meter Walk Test:   Talia et al., 2006  Small meaningful change: 0.06 m/s  Substantial meaningful change: 0.14 m/s  MCID: 0.16 m/s    < 0.4 m/s household ambulators  0.4 - 0.8 m/s limited community ambulators  > 0.8 m/s community ambulators   FGA: Deepika et al., 2010  MCID: 4.2 pts  Geriatrics/community < 22/30 fall risk  Geriatrics/community < 20/30 unexplained falls DGI  MDC: vestibular - 4 pts  MDC:  "geriatric/community - 3 pts  Falls risk <19/24   6 Minute Walk Test  Talia et al., 2006  MDC: 60.98 m (200.01 ft)    Cuco, Verónica, & Ally, 2012  MCID: 34.4 m    Age Norms  60-69: M - 1876 ft   F - 1765 ft  70-79: M - 1729 ft   F - 1545 ft  80-89: M - 1368 ft   F - 1286 ft Modified Joel  0: No increase in tone  1: Catch and release or min resistance at end range  1+: Catch f/b min resistance throughout remainder (< half ROM)  2: Easily moved, but more marked tone throughout most ROM  3: Significant tone, PROM difficult  4: Rigid   MiniBest: Glenda et al., 2013  CVA < 17.5 fall risk Pass (Acute CVA)  MDC: 1.8 points (acute), 3.2 points (chronic)         Subjective    History of Present Illness  Mechanism of injury: Patient is familiar to balance center as he participated with OT and PT services before self-discharging from PT after 09/06/24 secondary to \"feeling ready and past the point of needing PT services\". Patient past medical history significant for CVA (3rd one in January), ESRD which he goes to dialysis for 3x a week, frequent falls, and COVID. During his hiatus from PT patient had 2 falls and COVID with subsequent ~5 day hospitalization on 10/18/24. He had a second hospital visit ~2 weeks ago where he was exhibiting seizure like activity. As per patient, medical professionals did not provide rationale for seizure like activity as imaging returned negative. He was not placed on any new medication and was discharged home. Follow-up with neurology in January; denies any new seizure like activity. He ambulates with his rollator while out in his community and cruises on furniture at home despite recommended use of cane or RW at home. Patient largest complaint is that he feels overtly fatigued as his tolerance to activity has vastly decreased, increased word finding difficulties, and decreased balance. His major goal is to improve tolerance and return to level he was prior to self discharge.     Updated " (12/24/2024): Patient reports for reassessment after hiatus from PT services due to being sick with Covid. He feels his endurance has been worse since being sick.    Primary AD: rollator (PLOF he mostly used SPC and only used rollator for long distances like going to nephew's soccer game)   Assist level at home: lives with parents who are assisting with ADL's and IADL's, but he is transferring and walking independently.   WC usage: none  PLOF: using SPC mostly, still required some assist from parents for ADL's and IADL's   Patient goals: to walk with SPC again, work on balance, build up walking endurance     Pain  Pain in both feet due to neuropathy     Social Support  Steps to enter house: 2 NIRU  Stairs in house: full flight to 2nd floor (able to perform independently)    Lives in: 2 story home   Lives with: parents    Employment status: disabled   Hand dominance: right    Treatments  Previous treatment: PT, OT  Current treatment: PT, OT  Diagnostic Testing:       Objective   LE MMT   - R Hip Flexion: 4/5  - L Hip Flexion: 4+/5  - R Hip Extension: 4+/5  - L Hip Extension: 4+/5  - R Hip Abduction: 5/5  - L Hip abduction: 5/5  - R Hip adduction: 5/5  - L Hip adduction: 5/5  - R Knee Extension: 4+/5 - L Knee Extension: 4+/5  - R Knee Flexion: 4+/5  - L Knee Flexion: 4+/5  - R Ankle DF: 4/5  - L Ankle DF: 4/5  - R Ankle PF: 4+/5  - L Ankle PF: 4+/5    Sensation  - Light touch: neuropathy both feet up to below knee    Coordination  - Alternating Toe Taps: impaired  - Heel to shin: impaired   - Dysmetria: R side undershooting   - Dysdiadochokinesia: R slowed     Neglect  - R sided: No  - L sided: No    Gait  Wide COLBY, ataxic, decreased step length    Vitals:  HR: 55 bpm  SpO2: 97%  BP: 148/72 mmHg      Outcome Measures Initial Eval  1/30/24 PN/DC  9/4/2024 Initial Evaluation  11/18/24 PN  12/24/2024   5xSTS 31.25 sec 13.67 sec   no UE defer 16.33 sec,   no UE   TUG 16.3 sec with rollator    23.6 sec no AD 11.48 sec   no  AD 18.91 sec w/ rollator     17.16 sec no AD 12.18 sec   no AD   10 meter  1.11 m/s self selected pace 0.84 m/s self selected pace  1.05 m/s self selected pace   MEDRANO  49/56 32/56 40/56   FGA 12/30 15/30 defer 12/30   6MWT 900 ft 955 ft no AD defer 825 ft no AD   mCTSIB  FTEO firm  FTEC firm  FTEO foam  FTEC foam   30 sec  30 sec  30 sec  Unable defer 30 sec  30 sec  30 sec  2 sec   ABC  66.88% 62.5% 57.5%        Precautions: Check BP's RUE only (fistula LUE), frequent falls  Past Medical History:   Diagnosis Date    Acute kidney injury (HCC)     Ambulates with cane     Anuria     Anxiety     Cellulitis of right elbow 03/31/2021    Chronic kidney disease     COVID-19 10/18/2024    Depression     Diabetes mellitus (HCC)     Diarrhea     Emesis 10/24/2020    End stage renal disease (HCC) 02/11/2018    Formatting of this note might be different from the original. Last Assessment & Plan:  Secondary to DM.  On nightly PD.  Followed by [facility].  [facility] Formatting of this note might be different from the original. Last Assessment & Plan:  Formatting of this note might be different from the original. 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    Esophagitis 07/21/2015    Falls     Gastroparesis     GERD (gastroesophageal reflux disease)     History of shingles 2010    History of transfusion 02/2018    no adverse reaction    Hyperlipidemia     Hyperphosphatemia     Hypertension     Hypoglycemia 07/15/2022    Itching     Mastoiditis of right side 07/15/2022    Muscle weakness     general unsteadiness    Obesity (BMI 30.0-34.9) 09/09/2019    Orthostatic hypotension 10/25/2020    Peripheral polyneuropathy 11/20/2019    PONV (postoperative nausea and vomiting) 01/26/2018    Protein-calorie malnutrition (HCC) 11/23/2020    Recurrent peritonitis (HCC) due to peritoneal dialysis catheter 07/31/2020    Retinopathy     Seizures (HCC)      early 2020 - one time    Skin abnormality     some dime size areas where skin was scratched from itching    Sleep apnea     Spontaneous bacterial peritonitis (HCC) 10/19/2020    Squamous cell skin cancer     left temple    Stroke (HCC)     x2 - off balance/no driving/fatigue    Swelling of both lower extremities     Swelling of joint of upper arm, right 04/03/2024    Traumatic onycholysis 07/21/2022    Vomiting     Wears glasses     Word finding difficulty 11/05/2024

## 2024-12-24 NOTE — PROGRESS NOTES
"OT Daily Note     Today's date: 2024  Patient name: Mateus Mckeon  : 1985  MRN: [id number]  Referring provider: Dania Sher DO  Dx:   Encounter Diagnosis     ICD-10-CM    1. Cerebrovascular accident (CVA) due to other mechanism (HCC)  I63.89       2. Generalized weakness  R53.1       3. End stage renal disease on dialysis (HCC)  N18.6     Z99.2                        PLAN OF CARE START: 2024  PLAN OF CARE END: 3/17/2025  FREQUENCY: 2x/wk  PRECAUTIONS: Renal failure, actively going through dialysis; type 1 diabetes; HTN; SAH; seizure (last one was 2019)  ON FLUID RESTRICTIONS -- DO NOT OFFER WATER    *session started 5 min late due to poor weather conditions    Subjective: Pt reports \"I would say I'm not doing well but I think this is the best I'm ever going to be\"    Objective: See treatment diary below    NM  - nustep x5 min level 3 downgraded to level 2 retirement through to work on pushing/pulling for proprioceptive input to RUE and to work on endurance  - bent over rows with 4lbs resistance to provide WB/proprioceptive input to RUE, proximal strengthening, and scapular retraction      TE  - red therabar bends up, down, and twists  2x10 each direcrtion for wrist flexion/extension strengthening and UE endurance  - bicep curl to shoulder press to work on UE strength, endurance, biceps, and triceps strengthening    TA:  - placing and removing red/green clothespins on dowel requiring reach to and above shoulder height with 1lb wrist weight donned to work on pinch strengthening and RUE strengthening reaching over shoulder height    Assessment: Pt tolerated session well today. UE strengthening was of focus due to Pt not feeling well and holding these exercises over the last few sessions. He required frequent rest breaks due to RUE fatigue. Pt would benefit from continued skilled occupational therapy services to improve fine motor coordination, dexterity, strength, UE function, and " functional cognition.    Plan: Continue per plan of care.       SHORT TERM GOALS ADDED 2/20/24:  Pt will increase sustained attention by completing trail making part A in 35 seconds to complete IADLs NOT MET  Pt will increase divided attention by completing trail making part B in 1 minute 30 seconds to complete IADLs NOT MET  Pt will increase delayed recall and recall 4 /5 items on MOCA to complete IADLs GOAL MET    LONG TERM GOALS ADDED 2/20/24:  Pt will increase sustained attention by completing trail making part A in 38 seconds to complete IADLs  Pt will increase divided attention by completing trail making part B in 1 minute 10 seconds to complete IADLs  Pt will increase delayed recall and recall 5/5 items on MOCA to complete IADLs  Resume right  hand dominance to refined functional assist with all activities of daily living]

## 2024-12-25 NOTE — ASSESSMENT & PLAN NOTE
BP today in office 122/70  HR 75  Continue on nifedipine 120 mg daily, hydralazine 50 mg every 8 hours hold for SBP <150

## 2024-12-25 NOTE — ASSESSMENT & PLAN NOTE
Lab Results   Component Value Date    EGFR 8 12/09/2024    EGFR 5 11/10/2024    EGFR 7 11/09/2024    CREATININE 7.36 (H) 12/09/2024    CREATININE 10.61 (H) 11/10/2024    CREATININE 7.88 (H) 11/09/2024   On hemodialysis M,W, F   He is on transplant list and following with [facility]

## 2024-12-25 NOTE — PROGRESS NOTES
[facility]  General Cardiology Note  Mateus Mckeon  1985  [id number]      Referring Provider - Holden Whiteside, *  Chief Complaint   Patient presents with    Advice Only    Foot Swelling       Assessment & Plan  Coronary artery disease involving native coronary artery of native heart with angina pectoris ([facility])  Cardiac cath 7/2021 - no significant CAD  Denies any angina  On baby aspirin, and statin  Acute chest pain  Reports an episode of mild pinpoint chest pressure on 12/8 while dealing with nausea, vomiting and fatigue. Chest pressure only lasted a few minutes and has not recurred since. Recent cardiac workup in ED was negative  ECHO 11/6/24 - Ef 65%.  G1 DD.  Mild MAC.  Mild LAE. aortic valve probably bicuspid, small pericardial effusion  Nuclear stress test 7/9/2024 - no significant ischemia  Today, he denies any angina or anginal equivalent  Chest pressure likely musculoskeletal, GERD or anxiety. Will continue to monitor  Primary hypertension  BP today in office 122/70  HR 75  Continue on nifedipine 120 mg daily, hydralazine 50 mg every 8 hours hold for SBP <150  Type 1 diabetes mellitus on insulin therapy ([facility])    Lab Results   Component Value Date    HGBA1C 6.9 (H) 11/05/2024     Anemia in ESRD (end-stage renal disease)  ([facility])  Lab Results   Component Value Date    EGFR 8 12/09/2024    EGFR 5 11/10/2024    EGFR 7 11/09/2024    CREATININE 7.36 (H) 12/09/2024    CREATININE 10.61 (H) 11/10/2024    CREATININE 7.88 (H) 11/09/2024   On hemodialysis M,W, F   He is on transplant list and following with [facility]  Mixed hyperlipidemia  7/17/2024 lipid panel: , TG 61, HDL 43, LDL 53  Continue Lipitor 40 mg daily  Thromboembolic disorder ([facility])  Prothrombin gene mutation  Patient's mother reports that he was on an antiplatelet prior but following with hematology and they recommended to stay only on baby aspirin   Paroxysmal atrial fibrillation (HCC)  He did have 2 previous CVAs,  likely from uncontrolled BP. SANJEEV showed no structural heart disease, no thrombus. He had an implantable loop recorder in 2018 which did not show any atrial fibrillation. Explanted in Sep 2023     Procedure/testing if ordered:  Risks Vs benefits discussed   Medication side effect reviewed with patient in detail and all their questions answered to their satisfaction.    Relevant testing  Cardiac cath 7/2021 - no significant CAD  CORONARY VESSELS:   --  The coronary circulation is right dominant.  --  Left main: The vessel was medium to large sized and not calcified. Angiography showed mild atherosclerosis.  --  Circumflex: The vessel was medium sized. Angiography showed minor luminal irregularities.  --  Mid RCA: There was a 40 % stenosis.     ECHO 11/6/24 - Ef 65%.  G1 DD.  Mild MAC.  Mild LAE. aortic valve probably bicuspid, small pericardial effusion     Nuclear stress test 7/9/2024 - no significant ischemia      Will RTO in October 2025 or sooner if necessary  Patient / Caretaker was advised and educated to call our office  immediately if  patient has any new symptoms of chest pain/shortness of breath, near-syncope, syncope, light headedness sustained palpitations  or any other cardiovascular symptoms before their scheduled follow-up appointment.  ED precautions reviewed    Interval History: 39 y.o.  male  with PMH as below is here for HFU  Patient of Dr. Middleton  PMH of CAD, CVA, SAH, HTN, RACHEAL, GERD, type I DM, on dialysis    - was in the ED on 12/10.  Feeling unwell with nausea cough. Had an episode of pinpoint chest pressure the night prior at rest, no other associated symptoms. The pressure only lasted a few minutes but resolved on its own. Patient states the pressure was not worrisome, just felt the need to mention it in the ED.  He recently had COVID 2 months ago.  Cardiac workup was negative.  CXR shows no acute pulmonary disease    Today,  Patient presents with his mother. He denies any acute complaints    He is also following [facility] cardiologist twice a year because he is on the list for kidney transplant and [facility] cardiologist are keeping a close eye on him    Patient Active Problem List    Diagnosis Date Noted    Thromboembolic disorder ([facility]) 12/26/2024    Paroxysmal atrial fibrillation ([facility]) 12/26/2024    Myoclonic jerking 11/07/2024    Chronic kidney disease-mineral and bone disorder 11/06/2024    Hypertensive emergency 11/05/2024    Primary hypertension 10/18/2024    Secondary hyperparathyroidism of renal origin ([facility]) 10/18/2024    Non-aneurysmal perimesencephalic subarachnoid hemorrhage ([facility]) 04/03/2024    Arm paresthesia, right 03/07/2024    Subarachnoid hemorrhage ([facility]) 01/05/2024    Anemia due to chronic kidney disease, on chronic dialysis ([facility]) 01/05/2024    Type 1 diabetes mellitus on insulin therapy ([facility]) 11/26/2023    Hyperlipidemia 11/26/2023    Insomnia 11/26/2023    RACHEAL (obstructive sleep apnea)     Status post placement of implantable loop recorder 05/12/2022    Coronary artery disease involving native coronary artery of native heart without angina pectoris 04/22/2022    Hypothyroidism 02/08/2022    Cerebellar stroke syndrome 01/06/2022    Anemia in ESRD (end-stage renal disease)  ([facility]) 01/06/2022    Generalized anxiety disorder 07/01/2021    Medical cannabis use 04/15/2021    Moderate episode of recurrent major depressive disorder ([facility]) 04/15/2021    Tubulovillous adenoma of colon 01/18/2021    Gastroesophageal reflux disease without esophagitis 01/18/2021    ESRD on hemodialysis ([facility]) 12/03/2020    Secondary hyperparathyroidism ([facility]) 10/23/2020    Diabetic neuropathy ([facility]) 03/09/2020    Provoked seizure ([facility]) 03/09/2020    Asterixis 03/09/2020    Foot drop, bilateral 03/09/2020    Impaired mobility and ADLs 02/29/2020    Vertigo 02/20/2020    Multiple pulmonary nodules 12/09/2019    Gastroparesis diabeticorum  ([facility]) 09/17/2019    Pruritus 09/06/2019    Environmental and seasonal allergies  05/21/2019    Strabismus 09/24/2018    Dyslipidemia 08/24/2018    Heterozygous for prothrombin [id number] mutation (HCC) 06/04/2018    Renovascular hypertension 03/26/2018    Left atrial dilation 02/27/2018    Persistent proteinuria 02/11/2018    Anemia in chronic kidney disease, on chronic dialysis (HCC) 02/10/2018    Homozygous MTHFR mutation [id number] 02/05/2018    Hyperphosphatemia 01/29/2018    Vitamin D deficiency 01/29/2018    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,   01/28/2018    Binocular visual disturbance 01/28/2018    History of lacunar cerebrovascular accident (CVA) 01/22/2018    Type 1 diabetes mellitus (HCC) 06/19/2017    Ataxia 07/21/2015    Cerebrovascular accident (CVA) of left pontine structure (HCC) 07/21/2015     Past Medical History:   Diagnosis Date    Acute kidney injury (HCC)     Ambulates with cane     Anuria     Anxiety     Cellulitis of right elbow 03/31/2021    Chronic kidney disease     COVID-19 10/18/2024    Depression     Diabetes mellitus (HCC)     Diarrhea     Emesis 10/24/2020    End stage renal disease (HCC) 02/11/2018    Formatting of this note might be different from the original. Last Assessment & Plan:  Secondary to DM.  On nightly PD.  Followed by [facility].  Patient considering transplant for kidney and pancreas through [facility] Formatting of this note might be different from the original. Last Assessment & Plan:  Formatting of this note might be different from the original. 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    Esophagitis 07/21/2015    Falls     Gastroparesis     GERD (gastroesophageal reflux disease)     History of shingles 2010    History of transfusion 02/2018    no adverse reaction    Hyperlipidemia     Hyperphosphatemia     Hypertension     Hypoglycemia 07/15/2022    Itching     Mastoiditis of right side 07/15/2022    Muscle weakness     general unsteadiness    Obesity (BMI 30.0-34.9) 09/09/2019    Orthostatic hypotension  10/25/2020    Peripheral polyneuropathy 2019    PONV (postoperative nausea and vomiting) 2018    Protein-calorie malnutrition (HCC) 2020    Recurrent peritonitis (HCC) due to peritoneal dialysis catheter 2020    Retinopathy     Seizures (HCC)     early  - one time    Skin abnormality     some dime size areas where skin was scratched from itching    Sleep apnea     Spontaneous bacterial peritonitis (HCC) 10/19/2020    Squamous cell skin cancer     left temple    Stroke (HCC)     x2 - off balance/no driving/fatigue    Swelling of both lower extremities     Swelling of joint of upper arm, right 2024    Traumatic onycholysis 2022    Vomiting     Wears glasses     Word finding difficulty 2024     Social History     Tobacco Use    Smoking status: Former     Current packs/day: 0.00     Average packs/day: 0.5 packs/day for 12.0 years (6.0 ttl pk-yrs)     Types: Cigarettes     Start date: 2006     Quit date: 2018     Years since quittin.9    Smokeless tobacco: Never    Tobacco comments:     quit 2018   Vaping Use    Vaping status: Every Day    Substances: THC, CBD   Substance Use Topics    Alcohol use: Not Currently    Drug use: Yes     Types: Marijuana     Comment: medical marijuana last vaped 2024      Family History   Problem Relation Age of Onset    Breast cancer Mother     Hypertension Mother     Hyperlipidemia Father     Hypertension Father     Leukemia Maternal Grandmother     Hyperlipidemia Maternal Grandfather     Hypertension Maternal Grandfather     Hyperlipidemia Paternal Grandmother     Hypertension Paternal Grandmother     Heart disease Paternal Grandfather         cardiac disorder    Diabetes Paternal Grandfather      Past Surgical History:   Procedure Laterality Date    CARDIAC ELECTROPHYSIOLOGY PROCEDURE N/A 2023    Procedure: Cardiac loop recorder explant;  Surgeon: Parish Morgan MD;  Location: [facility];  Service: Cardiology     CARDIAC LOOP RECORDER  05/2018    COLONOSCOPY      EGD      EYE SURGERY Right     HEMODIALYSIS ADULT  11/6/2024    IR AV FISTULAGRAM/GRAFTOGRAM  02/23/2021    IR CEREBRAL ANGIOGRAPHY  1/12/2024    IR CEREBRAL ANGIOGRAPHY / INTERVENTION  1/5/2024    IR TUNNELED CENTRAL LINE PLACEMENT  02/16/2021    IR TUNNELED DIALYSIS CATHETER PLACEMENT  11/18/2020    IR TUNNELED DIALYSIS CATHETER REMOVAL  02/12/2021    IR TUNNELED DIALYSIS CATHETER REMOVAL  03/11/2021    MOHS SURGERY Left 12/14/2022    Left temple with Dr. Hassan    PERITONEAL CATHETER INSERTION N/A 08/27/2018    Procedure: UNROOF PD CATHETER;  Surgeon: Felipe Lindo DO;  Location: [facility];  Service: General    UT ARTERIOVENOUS ANASTOMOSIS OPEN DIRECT Left 11/09/2020    Procedure: CREATION FISTULA  ARTERIOVENOUS (AV) - LEFT WRIST;  Surgeon: Placido Altamirano MD;  Location: [facility];  Service: Vascular    UT ESOPHAGOGASTRODUODENOSCOPY TRANSORAL DIAGNOSTIC N/A 04/18/2019    Procedure: ESOPHAGOGASTRODUODENOSCOPY (EGD);  Surgeon: Ale Figueroa MD;  Location: [facility];  Service: Gastroenterology    UT LAPS INSERTION TUNNELED INTRAPERITONEAL CATHETER N/A 08/06/2018    Procedure: LAPAROSCOPIC PD CATHETER PLACEMENT;  Surgeon: Felipe Lindo DO;  Location: [facility];  Service: General    UT REMOVAL TUNNELED INTRAPERITONEAL CATHETER N/A 11/18/2020    Procedure: REMOVAL CATHETER PERITONEAL DIALYSIS;  Surgeon: Abdifatah Ty MD;  Location: [facility];  Service: General    TONSILLECTOMY      UPPER GASTROINTESTINAL ENDOSCOPY         Current Outpatient Medications:     aspirin (ECOTRIN LOW STRENGTH) 81 mg EC tablet, Take 81 mg by mouth daily, Disp: , Rfl:     atorvastatin (LIPITOR) 40 mg tablet, TAKE 1 TABLET BY MOUTH ONCE DAILY IN THE EVENING, Disp: 90 tablet, Rfl: 1    b complex vitamins capsule, Take 1 capsule by mouth daily before lunch , Disp: , Rfl:     calcium acetate (PHOSLO) capsule, Take 1 capsule (667 mg total) by mouth 3 (three) times a day, Disp: 90 capsule,  Rfl: 0    cinacalcet (SENSIPAR) 60 MG tablet, Take 1 tablet (60 mg total) by mouth daily, Disp: 30 tablet, Rfl: 0    escitalopram (LEXAPRO) 20 mg tablet, Take 1 tablet by mouth once daily, Disp: 90 tablet, Rfl: 0    famotidine (PEPCID) 40 MG tablet, TAKE 1 TABLET BY MOUTH IN THE MORNING, Disp: 90 tablet, Rfl: 1    gabapentin (NEURONTIN) 100 mg capsule, TAKE 1 CAPSULE BY MOUTH IN THE MORNING AND 2 AT BEDTIME, Disp: 90 capsule, Rfl: 5    GLUCAGON EMERGENCY 1 MG injection, , Disp: , Rfl: 3    Gvoke HypoPen 1-Pack 1 MG/0.2ML SOAJ, as needed, Disp: , Rfl:     hydrALAZINE (APRESOLINE) 50 mg tablet, Take 1 tablet (50 mg total) by mouth every 8 (eight) hours Holding hydralazine if your systolic blood pressure <150, Disp: 90 tablet, Rfl: 0    hydrOXYzine HCL (ATARAX) 10 mg tablet, Take 1 tablet (10 mg total) by mouth every 6 (six) hours as needed for itching or anxiety, Disp: 30 tablet, Rfl: 3    hyoscyamine (ANASPAZ,LEVSIN) 0.125 MG tablet, Take 1 tablet (0.125 mg total) by mouth every 4 (four) hours as needed for cramping, Disp: 30 tablet, Rfl: 0    Insulin Disposable Pump (Omnipod 5 G6 Intro, Gen 5,) KIT, , Disp: , Rfl:     Insulin Disposable Pump (Omnipod 5 G6 Pod, Gen 5,) MISC, , Disp: , Rfl:     labetalol (NORMODYNE) 200 mg tablet, Take 2 tablets (400 mg total) by mouth 3 (three) times a day (Patient taking differently: Take 400 mg by mouth 3 (three) times a day 200mg in AM, 400mg at lunch and dinner), Disp: 180 tablet, Rfl: 0    NIFEdipine (PROCARDIA XL) 90 mg 24 hr tablet, Take 90 mg by mouth daily (Patient taking differently: Take 120 mg by mouth daily), Disp: , Rfl:     olmesartan (BENICAR) 40 mg tablet, Take 40 mg by mouth daily, Disp: , Rfl:     ondansetron (ZOFRAN) 4 mg tablet, Take 1 tablet (4 mg total) by mouth every 8 (eight) hours as needed for nausea or vomiting, Disp: 90 tablet, Rfl: 1    Patiromer Sorbitex Calcium (VELTASSA PO), Take 5 g by mouth once a week, Disp: , Rfl:     SPS 15 GM/60ML suspension,  "TAKE 60 ML BY MOUTH SINGLE DOSE FOR EMERGENCY USE DURING MISSED HEMODIALYSIS, Disp: , Rfl:     traZODone (DESYREL) 50 mg tablet, TAKE 1/2 (ONE-HALF) TABLET BY MOUTH ONCE DAILY AT BEDTIME AS NEEDED FOR SLEEP, Disp: 30 tablet, Rfl: 0    calcitriol (ROCALTROL) 0.25 mcg capsule, Take 3 capsules (0.75 mcg total) by mouth 3 (three) times a week (Patient not taking: Reported on 11/19/2024), Disp: 90 capsule, Rfl: 0    NovoLOG 100 UNIT/ML injection, , Disp: , Rfl:     ondansetron (ZOFRAN) 4 mg tablet, Take 1 tablet (4 mg total) by mouth every 6 (six) hours (Patient not taking: Reported on 12/26/2024), Disp: 12 tablet, Rfl: 0    Allergies   Allergen Reactions    Sulfa Antibiotics Rash       Vitals:    12/26/24 0903   BP: 122/70   BP Location: Right arm   Patient Position: Sitting   Cuff Size: Large   Pulse: 75   Weight: 91.9 kg (202 lb 9.6 oz)   Height: 5' 11\" (1.803 m)        Vitals:    12/26/24 0903   Weight: 91.9 kg (202 lb 9.6 oz)      Height: 5' 11\" (180.3 cm)   Body mass index is 28.26 kg/m².    Labs:   7/17/2024 lipid panel: , TG 61, HDL 43, LDL 53  Lab Results   Component Value Date/Time    CHOLESTEROL 102 11/05/2024 08:34 PM    TRIG 101 11/05/2024 08:34 PM    TRIG 147 10/27/2015 02:49 PM    HDL 35 (L) 11/05/2024 08:34 PM    HDL 41 10/27/2015 02:49 PM    LDLCALC 47 11/05/2024 08:34 PM    LDLCALC 67 10/27/2015 02:49 PM      Lab Results   Component Value Date    SODIUM 142 12/09/2024    K 4.0 12/09/2024    CL 96 12/09/2024    CREATININE 7.36 (H) 12/09/2024    EGFR 8 12/09/2024    BUN 17 12/09/2024    CO2 38 (H) 12/09/2024    ALT 12 12/09/2024    AST 17 12/09/2024    TSH 3.41 10/28/2023    INR 0.99 11/06/2024    GLUF 155 (H) 07/16/2024    HGBA1C 6.9 (H) 11/05/2024    WBC 5.00 12/09/2024    HGB 10.1 (L) 12/09/2024    HCT 30.5 (L) 12/09/2024     12/09/2024         Imaging: XR chest 2 views  Result Date: 12/10/2024  Narrative: XR CHEST PA AND LATERAL INDICATION: fatigue, diminished rll, pna?. COMPARISON: Chest " radiograph 11/6/2024 FINDINGS: Clear lungs. No pneumothorax or pleural effusion. Normal cardiomediastinal silhouette. No acute osseous abnormalities. Anterior wedging of the midthoracic vertebral bodies. Normal upper abdomen.     Impression: No acute cardiopulmonary disease. Resident: PRANAY DOS SANTOS I, the attending radiologist, have reviewed the images and agree with the final report above. Workstation performed: [id number]     US bedside procedure  Result Date: 12/10/2024  Narrative: [id number]      ECG:  Normal sinus rhythm. Nonspecific T wave abnormality. Prolonged QT. 75 bpm   Reviewed by DEISI Bird      Review of Systems   Constitutional: Positive for malaise/fatigue (chronic). Negative for chills, diaphoresis and fever.   Cardiovascular:  Negative for chest pain, dyspnea on exertion, leg swelling, orthopnea, palpitations and syncope.   Respiratory:  Negative for cough and shortness of breath.    Musculoskeletal:  Positive for muscle weakness.   Gastrointestinal:  Positive for vomiting (occasional, chronic). Negative for abdominal pain and nausea.   Neurological:  Negative for dizziness, headaches and light-headedness.   Psychiatric/Behavioral:  Negative for altered mental status.    All other systems reviewed and are negative.    Except as noted in HPI, is otherwise reviewed in detail and a 12 point review of systems is negative.    Physical Exam  Vitals reviewed.   Constitutional:       General: He is not in acute distress.     Appearance: Normal appearance. He is not diaphoretic.   HENT:      Head: Normocephalic and atraumatic.   Eyes:      General: No scleral icterus.     Extraocular Movements: Extraocular movements intact.   Cardiovascular:      Rate and Rhythm: Normal rate and regular rhythm.      Pulses: Normal pulses.           Radial pulses are 2+ on the right side and 2+ on the left side.      Heart sounds: S1 normal and S2 normal. Murmur heard.   Pulmonary:       Effort: Pulmonary effort is normal. No tachypnea or respiratory distress.      Breath sounds: Normal breath sounds. No wheezing or rales.   Abdominal:      General: Bowel sounds are normal. There is no distension.      Palpations: Abdomen is soft.   Musculoskeletal:      Right lower leg: No edema.      Left lower leg: No edema.   Skin:     General: Skin is warm and dry.      Capillary Refill: Capillary refill takes less than 2 seconds.      Comments: Abrasions in LLE   Neurological:      Mental Status: He is alert and oriented to person, place, and time.      Motor: Weakness present.      Gait: Gait abnormal (cane).   Psychiatric:         Mood and Affect: Mood normal.         Behavior: Behavior normal.          **Please Note: This note may have been constructed using a voice recognition system**     Thank you for the opportunity to participate in the care of this patient.   DEISI Bird   Available

## 2024-12-26 ENCOUNTER — OFFICE VISIT (OUTPATIENT)
Dept: CARDIOLOGY CLINIC | Facility: CLINIC | Age: 39
End: 2024-12-26
Payer: MEDICARE

## 2024-12-26 ENCOUNTER — APPOINTMENT (OUTPATIENT)
Facility: CLINIC | Age: 39
End: 2024-12-26
Payer: MEDICARE

## 2024-12-26 VITALS
BODY MASS INDEX: 28.36 KG/M2 | SYSTOLIC BLOOD PRESSURE: 122 MMHG | DIASTOLIC BLOOD PRESSURE: 70 MMHG | WEIGHT: 202.6 LBS | HEIGHT: 71 IN | HEART RATE: 75 BPM

## 2024-12-26 DIAGNOSIS — I74.9 THROMBOEMBOLIC DISORDER (HCC): ICD-10-CM

## 2024-12-26 DIAGNOSIS — N18.6 ANEMIA IN ESRD (END-STAGE RENAL DISEASE)  (HCC): ICD-10-CM

## 2024-12-26 DIAGNOSIS — I48.0 PAROXYSMAL ATRIAL FIBRILLATION (HCC): ICD-10-CM

## 2024-12-26 DIAGNOSIS — E78.2 MIXED HYPERLIPIDEMIA: Chronic | ICD-10-CM

## 2024-12-26 DIAGNOSIS — D63.1 ANEMIA IN ESRD (END-STAGE RENAL DISEASE)  (HCC): ICD-10-CM

## 2024-12-26 DIAGNOSIS — R07.9 ACUTE CHEST PAIN: ICD-10-CM

## 2024-12-26 DIAGNOSIS — I25.119 CORONARY ARTERY DISEASE INVOLVING NATIVE CORONARY ARTERY OF NATIVE HEART WITH ANGINA PECTORIS (HCC): Primary | ICD-10-CM

## 2024-12-26 DIAGNOSIS — E10.9 TYPE 1 DIABETES MELLITUS ON INSULIN THERAPY (HCC): Chronic | ICD-10-CM

## 2024-12-26 DIAGNOSIS — I10 PRIMARY HYPERTENSION: ICD-10-CM

## 2024-12-26 PROCEDURE — 99214 OFFICE O/P EST MOD 30 MIN: CPT

## 2024-12-26 PROCEDURE — 93000 ELECTROCARDIOGRAM COMPLETE: CPT

## 2024-12-26 NOTE — PATIENT INSTRUCTIONS
Continue current medication regimen  Bring complete list of medications to your follow-up appointment.   Please call the office if you have any questions

## 2024-12-26 NOTE — ASSESSMENT & PLAN NOTE
He did have 2 previous CVAs, likely from uncontrolled BP. SANJEEV showed no structural heart disease, no thrombus. He had an implantable loop recorder in 2018 which did not show any atrial fibrillation. Explanted in Sep 2023

## 2024-12-26 NOTE — ASSESSMENT & PLAN NOTE
Prothrombin gene mutation  Patient's mother reports that he was on an antiplatelet prior but following with hematology and they recommended to stay only on baby aspirin

## 2024-12-27 ENCOUNTER — OFFICE VISIT (OUTPATIENT)
Facility: CLINIC | Age: 39
End: 2024-12-27
Payer: MEDICARE

## 2024-12-27 DIAGNOSIS — Z91.81 STATUS POST FALL: Primary | ICD-10-CM

## 2024-12-27 DIAGNOSIS — R26.89 OTHER ABNORMALITIES OF GAIT AND MOBILITY: ICD-10-CM

## 2024-12-27 DIAGNOSIS — I63.9 CEREBROVASCULAR ACCIDENT (CVA), UNSPECIFIED MECHANISM (HCC): ICD-10-CM

## 2024-12-27 PROCEDURE — 97112 NEUROMUSCULAR REEDUCATION: CPT | Performed by: PHYSICAL THERAPIST

## 2024-12-27 NOTE — PROGRESS NOTES
"Daily Note     Today's date: 2024  Patient name: Mateus Mckeon  : 1985  MRN: [id number]  Referring provider: Dania Sher DO  Dx:   Encounter Diagnosis     ICD-10-CM    1. Status post fall  Z91.81       2. Cerebrovascular accident (CVA), unspecified mechanism (HCC)  I63.9       3. Other abnormalities of gait and mobility  R26.89                     POC expires Auth Status Total   Visits  Start date  Expiration date PT/OT + Visit Limit? Co-Insurance   2/10/25 Submitted to FD 25, AV pending pending pending BOMN Yes, $0 co-pay                                               Subjective: Patient presents to PT with SPC stating he feels tired today    Objective: See treatment diary below  *FLUID RESTRICTION- do not offer water*     BP start of session: 144/82 mmHg   HR: 71 bpm  SpO2: 99%  Per [employer]: >180/110 mmHg at rest, or >250/115 mmHg with activity, need to stop     NMR/TE: Gait Belt and CGA/CS  - STS: 10x 2 sets  - Sidesteppin laps x10 ft  - FWD step taps: 20x 4\" step  - LAT step taps: 20x 4\" step  - LAT hurdles: 5 laps x 6-9\" hurdles  - FWD hurdles: 5 laps x 6-9\" hurdles (2 sets)  - FWD step ups: 20x 4\" step (2 sets)        Assessment: Patient tolerated treatment well. Challenged patient dynamic balance navigating in forward and lateral directions, requiring PT CGA and gait belt to maintain patient safety due to fatigue. Patient mainly limited by fatigue requiring frequent seated rest breaks. When navigating steps and hurdles, postural sway and lateral instability observed however patient able to self correct with appropriate stepping reaction >75% of attempts. Patient would benefit from continued PT to improve balance, endurance, and reduce fall risk.       Plan: Continue per plan of care.              Outcome Measures Initial Eval  24 PN/DC  2024 Initial Evaluation  24 PN  2024   5xSTS 31.25 sec 13.67 sec   no UE defer 16.33 sec,   no UE "   TUG 16.3 sec with rollator    23.6 sec no AD 11.48 sec   no AD 18.91 sec w/ rollator     17.16 sec no AD 12.18 sec   no AD   10 meter  1.11 m/s self selected pace 0.84 m/s self selected pace  1.05 m/s self selected pace   MEDRANO  49/56 32/56 40/56   FGA 12/30 15/30 defer 12/30   6MWT 900 ft 955 ft no AD defer 825 ft no AD   mCTSIB  FTEO firm  FTEC firm  FTEO foam  FTEC foam   30 sec  30 sec  30 sec  Unable defer 30 sec  30 sec  30 sec  2 sec   ABC  66.88% 62.5% 57.5%

## 2024-12-31 ENCOUNTER — OFFICE VISIT (OUTPATIENT)
Facility: CLINIC | Age: 39
End: 2024-12-31
Payer: MEDICARE

## 2024-12-31 ENCOUNTER — APPOINTMENT (OUTPATIENT)
Facility: CLINIC | Age: 39
End: 2024-12-31
Payer: MEDICARE

## 2024-12-31 DIAGNOSIS — N18.6 END STAGE RENAL DISEASE ON DIALYSIS (HCC): ICD-10-CM

## 2024-12-31 DIAGNOSIS — R26.89 OTHER ABNORMALITIES OF GAIT AND MOBILITY: ICD-10-CM

## 2024-12-31 DIAGNOSIS — I63.9 CEREBROVASCULAR ACCIDENT (CVA), UNSPECIFIED MECHANISM (HCC): ICD-10-CM

## 2024-12-31 DIAGNOSIS — Z99.2 END STAGE RENAL DISEASE ON DIALYSIS (HCC): ICD-10-CM

## 2024-12-31 DIAGNOSIS — I63.89 CEREBROVASCULAR ACCIDENT (CVA) DUE TO OTHER MECHANISM (HCC): Primary | ICD-10-CM

## 2024-12-31 DIAGNOSIS — Z91.81 STATUS POST FALL: Primary | ICD-10-CM

## 2024-12-31 DIAGNOSIS — R53.1 GENERALIZED WEAKNESS: ICD-10-CM

## 2024-12-31 DIAGNOSIS — F33.1 MODERATE EPISODE OF RECURRENT MAJOR DEPRESSIVE DISORDER (HCC): ICD-10-CM

## 2024-12-31 LAB
LEFT EYE DIABETIC RETINOPATHY: POSITIVE
RIGHT EYE DIABETIC RETINOPATHY: POSITIVE

## 2024-12-31 PROCEDURE — 97110 THERAPEUTIC EXERCISES: CPT

## 2024-12-31 PROCEDURE — 97530 THERAPEUTIC ACTIVITIES: CPT

## 2024-12-31 PROCEDURE — 97112 NEUROMUSCULAR REEDUCATION: CPT

## 2024-12-31 RX ORDER — ESCITALOPRAM OXALATE 20 MG/1
20 TABLET ORAL DAILY
Qty: 90 TABLET | Refills: 0 | Status: ON HOLD | OUTPATIENT
Start: 2024-12-31

## 2024-12-31 NOTE — PROGRESS NOTES
"Daily Note     Today's date: 2024  Patient name: Mateus Mckeon  : 1985  MRN: [id number]  Referring provider: Dania Sher DO  Dx:   Encounter Diagnosis     ICD-10-CM    1. Status post fall  Z91.81       2. Cerebrovascular accident (CVA), unspecified mechanism (HCC)  I63.9       3. Other abnormalities of gait and mobility  R26.89                       POC expires Auth Status Total   Visits  Start date  Expiration date PT/OT + Visit Limit? Co-Insurance   2/10/25 Submitted to FD 25, AV pending pending pending BOMN Yes, $0 co-pay                                               Subjective: Patient presents to PT with SPC form OT stating he hasn't been feeling well    Objective: See treatment diary below  *FLUID RESTRICTION- do not offer water*     BP start of session: 127/73 mmHg   HR: 81 bpm  SpO2: 95%  Per [employer]: >180/110 mmHg at rest, or >250/115 mmHg with activity, need to stop     NMR/TE: Gait Belt and CGA/CS (1 min on/ 2 min off)  - Sidestepping, 0UE  - STS no UE  - FWD hurdles 9\"   - LAT hurdles 9\"  - Step-ups to 4\" step  - LAT step-taps  - Standing on foam pad w/ 5.5# TT trunk rotations  - Overground ambulation no AD, CGA    Assessment: Patient tolerated treatment well with focus on endurance and functional strengthening. Introduced interval based approach to facilitate improvements in tolerance to activity and in cardiovascular endurance. Performed all exercises with limited upper extremity support to further challenge balance strategies and improve balance confidence. He did not demonstrate overt LoB throughout session however, with increased light headedness and dizziness following sit to stands. Patient would benefit from continued PT to improve balance, endurance, and reduce fall risk.     Plan: Continue per plan of care.          Outcome Measures Initial Eval  24 PN/DC  2024 Initial Evaluation  24 PN  2024   5xSTS 31.25 sec 13.67 sec "   no UE defer 16.33 sec,   no UE   TUG 16.3 sec with rollator    23.6 sec no AD 11.48 sec   no AD 18.91 sec w/ rollator     17.16 sec no AD 12.18 sec   no AD   10 meter  1.11 m/s self selected pace 0.84 m/s self selected pace  1.05 m/s self selected pace   MEDRANO  49/56 32/56 40/56   FGA 12/30 15/30 defer 12/30   6MWT 900 ft 955 ft no AD defer 825 ft no AD   mCTSIB  FTEO firm  FTEC firm  FTEO foam  FTEC foam   30 sec  30 sec  30 sec  Unable defer 30 sec  30 sec  30 sec  2 sec   ABC  66.88% 62.5% 57.5%

## 2024-12-31 NOTE — PROGRESS NOTES
"OT Daily Note     Today's date: 2024  Patient name: Mateus Mckeon  : 1985  MRN: [id number]  Referring provider: Dania Sher DO  Dx:   Encounter Diagnosis     ICD-10-CM    1. Cerebrovascular accident (CVA) due to other mechanism (HCC)  I63.89       2. Generalized weakness  R53.1       3. End stage renal disease on dialysis (HCC)  N18.6     Z99.2               Start Time: 0800  Stop Time: 08  Total time in clinic (min): 43 minutes    PLAN OF CARE START: 2024  PLAN OF CARE END: 3/17/2025  FREQUENCY: 2x/wk  PRECAUTIONS: Renal failure, actively going through dialysis; type 1 diabetes; HTN; SAH; seizure (last one was )  ON FLUID RESTRICTIONS -- DO NOT OFFER WATER    Subjective: Pt reports \"I don't know if I will ever be able to fight this bug that I have, I still feel sick.\"    Objective: See treatment diary below    NM  - juxacisor 2x2 min with 1lb wrist weight donned for UE coordination and endurance   - bent over unilateral rows with 4lbs 3x10 to work on proximal strength, postural control, scapular retraction, and biceps strength    TE  - Shoulder flexion -> abduction 3x until fatigue (x10, x7, x9) with 2lb to work on shoulder strength, scapular retraction/stabilization, and endurance  - tricep extensions with green theraband to work on triceps strength and endurance     TA:  - yellow theraputty stamps with elbow extended 2x2min to improve sustained  strength  - yellow theraputty drags with lateral pinch tool 3x1min to improve sustained lateral pinch  - yellow theraputty push and twist with lateral pinch tool 3x2min to improve sustained lateral pinch and wrist radial/ulnar deviation strength    Assessment: Pt tolerated session well today. Pt continues to fatigue quickly and require prolonged rest breaks between sets of exercises with RUE. Pt would benefit from continued skilled occupational therapy services to improve fine motor coordination, dexterity, strength, UE " function, and functional cognition.    Plan: Continue per plan of care.       SHORT TERM GOALS ADDED 2/20/24:  Pt will increase sustained attention by completing trail making part A in 35 seconds to complete IADLs NOT MET  Pt will increase divided attention by completing trail making part B in 1 minute 30 seconds to complete IADLs NOT MET  Pt will increase delayed recall and recall 4 /5 items on MOCA to complete IADLs GOAL MET    LONG TERM GOALS ADDED 2/20/24:  Pt will increase sustained attention by completing trail making part A in 38 seconds to complete IADLs  Pt will increase divided attention by completing trail making part B in 1 minute 10 seconds to complete IADLs  Pt will increase delayed recall and recall 5/5 items on MOCA to complete IADLs  Resume right  hand dominance to refined functional assist with all activities of daily living]

## 2025-01-02 ENCOUNTER — OFFICE VISIT (OUTPATIENT)
Facility: CLINIC | Age: 40
End: 2025-01-02
Payer: MEDICARE

## 2025-01-02 DIAGNOSIS — R53.1 GENERALIZED WEAKNESS: ICD-10-CM

## 2025-01-02 DIAGNOSIS — I63.89 CEREBROVASCULAR ACCIDENT (CVA) DUE TO OTHER MECHANISM (HCC): Primary | ICD-10-CM

## 2025-01-02 DIAGNOSIS — Z91.81 STATUS POST FALL: Primary | ICD-10-CM

## 2025-01-02 DIAGNOSIS — N18.6 END STAGE RENAL DISEASE ON DIALYSIS (HCC): ICD-10-CM

## 2025-01-02 DIAGNOSIS — Z99.2 END STAGE RENAL DISEASE ON DIALYSIS (HCC): ICD-10-CM

## 2025-01-02 DIAGNOSIS — I63.9 CEREBROVASCULAR ACCIDENT (CVA), UNSPECIFIED MECHANISM (HCC): ICD-10-CM

## 2025-01-02 PROCEDURE — 97110 THERAPEUTIC EXERCISES: CPT

## 2025-01-02 PROCEDURE — 97530 THERAPEUTIC ACTIVITIES: CPT

## 2025-01-02 PROCEDURE — 97112 NEUROMUSCULAR REEDUCATION: CPT

## 2025-01-02 NOTE — PROGRESS NOTES
OT Daily Note     Today's date: 2025  Patient name: Mateus Mckeon  : 1985  MRN: [id number]  Referring provider: Dania Sher DO  Dx:   Encounter Diagnosis     ICD-10-CM    1. Cerebrovascular accident (CVA) due to other mechanism (HCC)  I63.89       2. Generalized weakness  R53.1       3. End stage renal disease on dialysis (HCC)  N18.6     Z99.2                 Start Time: 0800  Stop Time: 0845  Total time in clinic (min): 45 minutes    PLAN OF CARE START: 2024  PLAN OF CARE END: 3/17/2025  FREQUENCY: 2x/wk  PRECAUTIONS: Renal failure, actively going through dialysis; type 1 diabetes; HTN; SAH; seizure (last one was )  ON FLUID RESTRICTIONS -- DO NOT OFFER WATER    Subjective: Pt reports he always feels tired    Objective: See treatment diary below    NM  - Nustep x4 min level 3 for RUE proprioceptive input, endurance, and proximal strength  - threading weight between hands while standing and WB into the mat with unilateral rows after each rep, 4lbs 3x5 to work on UE endurance, RUE weightbearing and triceps strength,     TE  - shoulder ER from neutral with red theraband 2x10 to work on strength, endurance, and postural control     TA:  - multimatrix trail making using green clothespin to work on R hand pinch strength, gradation of pinch, and coordination      Assessment: Pt tolerated session well today. Pt exhibited frustration with pinching clothespin on multimatrix cubes for trail making, however required no cues for problem solving during this task. Continues to require rest breaks between sets of activities due to RUE fatigue. Pt would benefit from continued skilled occupational therapy services to improve fine motor coordination, dexterity, strength, UE function, and functional cognition.    Plan: Continue per plan of care.       SHORT TERM GOALS ADDED 24:  Pt will increase sustained attention by completing trail making part A in 35 seconds to complete IADLs NOT MET  Pt  will increase divided attention by completing trail making part B in 1 minute 30 seconds to complete IADLs NOT MET  Pt will increase delayed recall and recall 4 /5 items on MOCA to complete IADLs GOAL MET    LONG TERM GOALS ADDED 2/20/24:  Pt will increase sustained attention by completing trail making part A in 38 seconds to complete IADLs  Pt will increase divided attention by completing trail making part B in 1 minute 10 seconds to complete IADLs  Pt will increase delayed recall and recall 5/5 items on MOCA to complete IADLs  Resume right  hand dominance to refined functional assist with all activities of daily living]

## 2025-01-02 NOTE — PROGRESS NOTES
"Daily Note     Today's date: 2025  Patient name: Mateus Mckeon  : 1985  MRN: [id number]  Referring provider: Dania Sher DO  Dx:   Encounter Diagnosis     ICD-10-CM    1. Status post fall  Z91.81       2. Cerebrovascular accident (CVA), unspecified mechanism (HCC)  I63.9                         POC expires Auth Status Total   Visits  Start date  Expiration date PT/OT + Visit Limit? Co-Insurance   2/10/25 Submitted to FD 25, AV pending pending pending BOMN Yes, $0 co-pay                                               Subjective: Patient presents to PT with SPC form OT stating he feels \"the usual\" Had eye injection Tuesday.     Objective: See treatment diary below  *FLUID RESTRICTION- do not offer water*     BP start of session: 127/73 mmHg   HR: 81 bpm  SpO2: 95%  Per [employer]: >180/110 mmHg at rest, or >250/115 mmHg with activity, need to stop     NMR/TE: Gait Belt and CGA/CS (1 min on/ 2 min off; progressed to 1 minute off mid session)  - Sidestepping, 0UE  - STS no UE  - FWD hurdles 9\"  0 UE  - LAT hurdles 9\" 0 UE  - Step-ups to medium river rocks, 0 UE  - LAT step ups to 4\" step  - Standing on foam pad w/ 5.5# MB trunk rotations  - Standing on foam pad w/ 5.5# MB chest presses/shoulder presses  - Overground ambulation no AD, CGA  - Staggered stance front LE on small river rock, ring toss to cone    Assessment: Patient tolerated treatment well with focus on endurance and functional strengthening. Continued with interval based approach to facilitate improvements in tolerance to activity and in cardiovascular endurance; he reported feeling he could tolerate lesser rest breaks so reduced to 1 minute rest mid-session. Patient tolerated 1 minute on/1 minute off well without adverse symptoms.  Performed all exercises without upper extremity support to further challenge balance strategies and improve balance confidence. He did not demonstrate overt LoB throughout session; " did have posterior sway when performing UE movements on foam pad but able to recover independently. Patient would benefit from continued PT to improve balance, endurance, and reduce fall risk.     Plan: Continue per plan of care.          Outcome Measures Initial Eval  1/30/24 PN/DC  9/4/2024 Initial Evaluation  11/18/24 PN  12/24/2024   5xSTS 31.25 sec 13.67 sec   no UE defer 16.33 sec,   no UE   TUG 16.3 sec with rollator    23.6 sec no AD 11.48 sec   no AD 18.91 sec w/ rollator     17.16 sec no AD 12.18 sec   no AD   10 meter  1.11 m/s self selected pace 0.84 m/s self selected pace  1.05 m/s self selected pace   MEDRANO  49/56 32/56 40/56   FGA 12/30 15/30 defer 12/30   6MWT 900 ft 955 ft no AD defer 825 ft no AD   mCTSIB  FTEO firm  FTEC firm  FTEO foam  FTEC foam   30 sec  30 sec  30 sec  Unable defer 30 sec  30 sec  30 sec  2 sec   ABC  66.88% 62.5% 57.5%

## 2025-01-04 ENCOUNTER — HOSPITAL ENCOUNTER (INPATIENT)
Facility: HOSPITAL | Age: 40
LOS: 3 days | Discharge: HOME | DRG: 193 | End: 2025-01-07
Attending: EMERGENCY MEDICINE | Admitting: INTERNAL MEDICINE
Payer: MEDICARE

## 2025-01-04 ENCOUNTER — APPOINTMENT (EMERGENCY)
Dept: CT IMAGING | Facility: HOSPITAL | Age: 40
DRG: 193 | End: 2025-01-04
Payer: MEDICARE

## 2025-01-04 DIAGNOSIS — N18.6 ESRD ON HEMODIALYSIS (HCC): ICD-10-CM

## 2025-01-04 DIAGNOSIS — K21.9 GASTROESOPHAGEAL REFLUX DISEASE WITHOUT ESOPHAGITIS: Chronic | ICD-10-CM

## 2025-01-04 DIAGNOSIS — R11.10 VOMITING: ICD-10-CM

## 2025-01-04 DIAGNOSIS — I16.1 HYPERTENSIVE EMERGENCY: Primary | ICD-10-CM

## 2025-01-04 DIAGNOSIS — J10.1 INFLUENZA B: ICD-10-CM

## 2025-01-04 DIAGNOSIS — I10 ESSENTIAL (PRIMARY) HYPERTENSION: ICD-10-CM

## 2025-01-04 DIAGNOSIS — Z99.2 ESRD ON HEMODIALYSIS (HCC): ICD-10-CM

## 2025-01-04 DIAGNOSIS — R51.9 HEADACHE: ICD-10-CM

## 2025-01-04 LAB
2HR DELTA HS TROPONIN: -8 NG/L
4HR DELTA HS TROPONIN: -6 NG/L
ALBUMIN SERPL BCG-MCNC: 4.4 G/DL (ref 3.5–5)
ALP SERPL-CCNC: 95 U/L (ref 34–104)
ALT SERPL W P-5'-P-CCNC: 12 U/L (ref 7–52)
ANION GAP SERPL CALCULATED.3IONS-SCNC: 11 MMOL/L (ref 4–13)
APTT PPP: 31 SECONDS (ref 23–34)
AST SERPL W P-5'-P-CCNC: 19 U/L (ref 13–39)
ATRIAL RATE: 69 BPM
BASOPHILS # BLD AUTO: 0.05 THOUSANDS/ΜL (ref 0–0.1)
BASOPHILS NFR BLD AUTO: 1 % (ref 0–1)
BILIRUB SERPL-MCNC: 0.59 MG/DL (ref 0.2–1)
BNP SERPL-MCNC: 957 PG/ML (ref 0–100)
BUN SERPL-MCNC: 22 MG/DL (ref 5–25)
CALCIUM SERPL-MCNC: 9.1 MG/DL (ref 8.4–10.2)
CARDIAC TROPONIN I PNL SERPL HS: 15 NG/L (ref ?–50)
CARDIAC TROPONIN I PNL SERPL HS: 17 NG/L (ref ?–50)
CARDIAC TROPONIN I PNL SERPL HS: 23 NG/L (ref ?–50)
CHLORIDE SERPL-SCNC: 98 MMOL/L (ref 96–108)
CO2 SERPL-SCNC: 32 MMOL/L (ref 21–32)
CREAT SERPL-MCNC: 9.02 MG/DL (ref 0.6–1.3)
EOSINOPHIL # BLD AUTO: 0.33 THOUSAND/ΜL (ref 0–0.61)
EOSINOPHIL NFR BLD AUTO: 6 % (ref 0–6)
ERYTHROCYTE [DISTWIDTH] IN BLOOD BY AUTOMATED COUNT: 14.7 % (ref 11.6–15.1)
FLUAV AG UPPER RESP QL IA.RAPID: NEGATIVE
FLUBV AG UPPER RESP QL IA.RAPID: POSITIVE
GFR SERPL CREATININE-BSD FRML MDRD: 6 ML/MIN/1.73SQ M
GLUCOSE SERPL-MCNC: 162 MG/DL (ref 65–140)
HCT VFR BLD AUTO: 36.4 % (ref 36.5–49.3)
HGB BLD-MCNC: 11.8 G/DL (ref 12–17)
IMM GRANULOCYTES # BLD AUTO: 0.03 THOUSAND/UL (ref 0–0.2)
IMM GRANULOCYTES NFR BLD AUTO: 1 % (ref 0–2)
INR PPP: 0.9 (ref 0.85–1.19)
LYMPHOCYTES # BLD AUTO: 1.37 THOUSANDS/ΜL (ref 0.6–4.47)
LYMPHOCYTES NFR BLD AUTO: 26 % (ref 14–44)
MAGNESIUM SERPL-MCNC: 2.5 MG/DL (ref 1.9–2.7)
MCH RBC QN AUTO: 32.3 PG (ref 26.8–34.3)
MCHC RBC AUTO-ENTMCNC: 32.4 G/DL (ref 31.4–37.4)
MCV RBC AUTO: 100 FL (ref 82–98)
MONOCYTES # BLD AUTO: 0.36 THOUSAND/ΜL (ref 0.17–1.22)
MONOCYTES NFR BLD AUTO: 7 % (ref 4–12)
NEUTROPHILS # BLD AUTO: 3.16 THOUSANDS/ΜL (ref 1.85–7.62)
NEUTS SEG NFR BLD AUTO: 59 % (ref 43–75)
NRBC BLD AUTO-RTO: 0 /100 WBCS
P AXIS: 61 DEGREES
PLATELET # BLD AUTO: 189 THOUSANDS/UL (ref 149–390)
PLATELET # BLD AUTO: 227 THOUSANDS/UL (ref 149–390)
PMV BLD AUTO: 10 FL (ref 8.9–12.7)
PMV BLD AUTO: 10 FL (ref 8.9–12.7)
POTASSIUM SERPL-SCNC: 4.6 MMOL/L (ref 3.5–5.3)
PR INTERVAL: 174 MS
PROT SERPL-MCNC: 6.6 G/DL (ref 6.4–8.4)
PROTHROMBIN TIME: 12.9 SECONDS (ref 12.3–15)
QRS AXIS: 60 DEGREES
QRSD INTERVAL: 68 MS
QT INTERVAL: 440 MS
QTC INTERVAL: 471 MS
RBC # BLD AUTO: 3.65 MILLION/UL (ref 3.88–5.62)
SARS-COV+SARS-COV-2 AG RESP QL IA.RAPID: NEGATIVE
SODIUM SERPL-SCNC: 141 MMOL/L (ref 135–147)
T WAVE AXIS: 32 DEGREES
VENTRICULAR RATE: 69 BPM
WBC # BLD AUTO: 5.3 THOUSAND/UL (ref 4.31–10.16)

## 2025-01-04 PROCEDURE — 84484 ASSAY OF TROPONIN QUANT: CPT | Performed by: EMERGENCY MEDICINE

## 2025-01-04 PROCEDURE — 93005 ELECTROCARDIOGRAM TRACING: CPT

## 2025-01-04 PROCEDURE — 99285 EMERGENCY DEPT VISIT HI MDM: CPT

## 2025-01-04 PROCEDURE — 96375 TX/PRO/DX INJ NEW DRUG ADDON: CPT

## 2025-01-04 PROCEDURE — 83735 ASSAY OF MAGNESIUM: CPT | Performed by: EMERGENCY MEDICINE

## 2025-01-04 PROCEDURE — 85730 THROMBOPLASTIN TIME PARTIAL: CPT | Performed by: EMERGENCY MEDICINE

## 2025-01-04 PROCEDURE — 96376 TX/PRO/DX INJ SAME DRUG ADON: CPT

## 2025-01-04 PROCEDURE — 96374 THER/PROPH/DIAG INJ IV PUSH: CPT

## 2025-01-04 PROCEDURE — 87804 INFLUENZA ASSAY W/OPTIC: CPT | Performed by: EMERGENCY MEDICINE

## 2025-01-04 PROCEDURE — 87811 SARS-COV-2 COVID19 W/OPTIC: CPT | Performed by: EMERGENCY MEDICINE

## 2025-01-04 PROCEDURE — 80053 COMPREHEN METABOLIC PANEL: CPT | Performed by: EMERGENCY MEDICINE

## 2025-01-04 PROCEDURE — 85049 AUTOMATED PLATELET COUNT: CPT

## 2025-01-04 PROCEDURE — 85025 COMPLETE CBC W/AUTO DIFF WBC: CPT | Performed by: EMERGENCY MEDICINE

## 2025-01-04 PROCEDURE — 83880 ASSAY OF NATRIURETIC PEPTIDE: CPT | Performed by: EMERGENCY MEDICINE

## 2025-01-04 PROCEDURE — 70450 CT HEAD/BRAIN W/O DYE: CPT

## 2025-01-04 PROCEDURE — 5A1D70Z PERFORMANCE OF URINARY FILTRATION, INTERMITTENT, LESS THAN 6 HOURS PER DAY: ICD-10-PCS | Performed by: INTERNAL MEDICINE

## 2025-01-04 PROCEDURE — 36415 COLL VENOUS BLD VENIPUNCTURE: CPT

## 2025-01-04 PROCEDURE — 84484 ASSAY OF TROPONIN QUANT: CPT

## 2025-01-04 PROCEDURE — 85610 PROTHROMBIN TIME: CPT | Performed by: EMERGENCY MEDICINE

## 2025-01-04 PROCEDURE — NC001 PR NO CHARGE: Performed by: INTERNAL MEDICINE

## 2025-01-04 PROCEDURE — 99291 CRITICAL CARE FIRST HOUR: CPT | Performed by: EMERGENCY MEDICINE

## 2025-01-04 RX ORDER — ESCITALOPRAM OXALATE 20 MG/1
20 TABLET ORAL DAILY
Status: DISCONTINUED | OUTPATIENT
Start: 2025-01-05 | End: 2025-01-07 | Stop reason: HOSPADM

## 2025-01-04 RX ORDER — FAMOTIDINE 20 MG/1
40 TABLET, FILM COATED ORAL EVERY MORNING
Status: DISCONTINUED | OUTPATIENT
Start: 2025-01-05 | End: 2025-01-06

## 2025-01-04 RX ORDER — ATORVASTATIN CALCIUM 40 MG/1
40 TABLET, FILM COATED ORAL EVERY EVENING
Status: DISCONTINUED | OUTPATIENT
Start: 2025-01-04 | End: 2025-01-07 | Stop reason: HOSPADM

## 2025-01-04 RX ORDER — HYDRALAZINE HYDROCHLORIDE 25 MG/1
50 TABLET, FILM COATED ORAL EVERY 8 HOURS SCHEDULED
Status: DISCONTINUED | OUTPATIENT
Start: 2025-01-04 | End: 2025-01-06

## 2025-01-04 RX ORDER — OSELTAMIVIR PHOSPHATE 30 MG/1
30 CAPSULE ORAL ONCE
Status: COMPLETED | OUTPATIENT
Start: 2025-01-04 | End: 2025-01-04

## 2025-01-04 RX ORDER — ASPIRIN 81 MG/1
81 TABLET ORAL DAILY
Status: DISCONTINUED | OUTPATIENT
Start: 2025-01-05 | End: 2025-01-07 | Stop reason: HOSPADM

## 2025-01-04 RX ORDER — GABAPENTIN 100 MG/1
100 CAPSULE ORAL
Status: DISCONTINUED | OUTPATIENT
Start: 2025-01-05 | End: 2025-01-07 | Stop reason: HOSPADM

## 2025-01-04 RX ORDER — OLMESARTAN MEDOXOMIL 20 MG/1
40 TABLET ORAL DAILY
Status: DISCONTINUED | OUTPATIENT
Start: 2025-01-05 | End: 2025-01-05 | Stop reason: ALTCHOICE

## 2025-01-04 RX ORDER — CHLORHEXIDINE GLUCONATE ORAL RINSE 1.2 MG/ML
15 SOLUTION DENTAL EVERY 12 HOURS SCHEDULED
Status: DISCONTINUED | OUTPATIENT
Start: 2025-01-04 | End: 2025-01-07 | Stop reason: HOSPADM

## 2025-01-04 RX ORDER — ENOXAPARIN SODIUM 100 MG/ML
30 INJECTION SUBCUTANEOUS DAILY
Status: DISCONTINUED | OUTPATIENT
Start: 2025-01-05 | End: 2025-01-05

## 2025-01-04 RX ORDER — GABAPENTIN 100 MG/1
200 CAPSULE ORAL
Status: DISCONTINUED | OUTPATIENT
Start: 2025-01-04 | End: 2025-01-07 | Stop reason: HOSPADM

## 2025-01-04 RX ORDER — HYDRALAZINE HYDROCHLORIDE 20 MG/ML
10 INJECTION INTRAMUSCULAR; INTRAVENOUS ONCE
Status: COMPLETED | OUTPATIENT
Start: 2025-01-04 | End: 2025-01-04

## 2025-01-04 RX ORDER — NIFEDIPINE 30 MG/1
60 TABLET, EXTENDED RELEASE ORAL DAILY
Status: DISCONTINUED | OUTPATIENT
Start: 2025-01-05 | End: 2025-01-04

## 2025-01-04 RX ORDER — LABETALOL HYDROCHLORIDE 5 MG/ML
10 INJECTION, SOLUTION INTRAVENOUS ONCE
Status: COMPLETED | OUTPATIENT
Start: 2025-01-04 | End: 2025-01-04

## 2025-01-04 RX ORDER — ONDANSETRON 2 MG/ML
4 INJECTION INTRAMUSCULAR; INTRAVENOUS EVERY 6 HOURS PRN
Status: DISCONTINUED | OUTPATIENT
Start: 2025-01-04 | End: 2025-01-07 | Stop reason: HOSPADM

## 2025-01-04 RX ORDER — LABETALOL 100 MG/1
400 TABLET, FILM COATED ORAL 3 TIMES DAILY
Status: DISCONTINUED | OUTPATIENT
Start: 2025-01-04 | End: 2025-01-07 | Stop reason: HOSPADM

## 2025-01-04 RX ORDER — NIFEDIPINE 30 MG/1
90 TABLET, EXTENDED RELEASE ORAL DAILY
Status: DISCONTINUED | OUTPATIENT
Start: 2025-01-05 | End: 2025-01-06

## 2025-01-04 RX ORDER — CINACALCET 30 MG/1
60 TABLET, FILM COATED ORAL DAILY
Status: DISCONTINUED | OUTPATIENT
Start: 2025-01-05 | End: 2025-01-07 | Stop reason: HOSPADM

## 2025-01-04 RX ADMIN — NICARDIPINE HYDROCHLORIDE 5 MG/HR: 2.5 INJECTION, SOLUTION INTRAVENOUS at 22:34

## 2025-01-04 RX ADMIN — HYDRALAZINE HYDROCHLORIDE 10 MG: 20 INJECTION, SOLUTION INTRAMUSCULAR; INTRAVENOUS at 20:51

## 2025-01-04 RX ADMIN — GABAPENTIN 200 MG: 100 CAPSULE ORAL at 22:56

## 2025-01-04 RX ADMIN — NICARDIPINE HYDROCHLORIDE 10 MG/HR: 2.5 INJECTION, SOLUTION INTRAVENOUS at 23:23

## 2025-01-04 RX ADMIN — OSELTAMAVIR PHOSPHATE 30 MG: 30 CAPSULE ORAL at 22:32

## 2025-01-04 RX ADMIN — HYDRALAZINE HYDROCHLORIDE 50 MG: 25 TABLET ORAL at 22:58

## 2025-01-04 RX ADMIN — LABETALOL HYDROCHLORIDE 10 MG: 5 INJECTION, SOLUTION INTRAVENOUS at 20:16

## 2025-01-04 RX ADMIN — LABETALOL HYDROCHLORIDE 400 MG: 200 TABLET, FILM COATED ORAL at 22:57

## 2025-01-04 RX ADMIN — ONDANSETRON 4 MG: 2 INJECTION INTRAMUSCULAR; INTRAVENOUS at 23:57

## 2025-01-04 RX ADMIN — LABETALOL HYDROCHLORIDE 10 MG: 5 INJECTION, SOLUTION INTRAVENOUS at 21:57

## 2025-01-05 PROBLEM — N25.81 SECONDARY HYPERPARATHYROIDISM OF RENAL ORIGIN (HCC): Status: RESOLVED | Noted: 2024-10-18 | Resolved: 2025-01-05

## 2025-01-05 LAB
ALBUMIN SERPL BCG-MCNC: 3.9 G/DL (ref 3.5–5)
ALP SERPL-CCNC: 75 U/L (ref 34–104)
ALT SERPL W P-5'-P-CCNC: 9 U/L (ref 7–52)
ANION GAP SERPL CALCULATED.3IONS-SCNC: 11 MMOL/L (ref 4–13)
AST SERPL W P-5'-P-CCNC: 18 U/L (ref 13–39)
BASOPHILS # BLD AUTO: 0.05 THOUSANDS/ΜL (ref 0–0.1)
BASOPHILS NFR BLD AUTO: 1 % (ref 0–1)
BILIRUB SERPL-MCNC: 0.5 MG/DL (ref 0.2–1)
BUN SERPL-MCNC: 24 MG/DL (ref 5–25)
CALCIUM SERPL-MCNC: 8.5 MG/DL (ref 8.4–10.2)
CHLORIDE SERPL-SCNC: 101 MMOL/L (ref 96–108)
CO2 SERPL-SCNC: 30 MMOL/L (ref 21–32)
CREAT SERPL-MCNC: 9.71 MG/DL (ref 0.6–1.3)
EOSINOPHIL # BLD AUTO: 0.32 THOUSAND/ΜL (ref 0–0.61)
EOSINOPHIL NFR BLD AUTO: 7 % (ref 0–6)
ERYTHROCYTE [DISTWIDTH] IN BLOOD BY AUTOMATED COUNT: 14.8 % (ref 11.6–15.1)
GFR SERPL CREATININE-BSD FRML MDRD: 5 ML/MIN/1.73SQ M
GLUCOSE SERPL-MCNC: 144 MG/DL (ref 65–140)
GLUCOSE SERPL-MCNC: 154 MG/DL (ref 65–140)
GLUCOSE SERPL-MCNC: 165 MG/DL (ref 65–140)
GLUCOSE SERPL-MCNC: 190 MG/DL (ref 65–140)
GLUCOSE SERPL-MCNC: 214 MG/DL (ref 65–140)
HCT VFR BLD AUTO: 33.3 % (ref 36.5–49.3)
HGB BLD-MCNC: 10.9 G/DL (ref 12–17)
IMM GRANULOCYTES # BLD AUTO: 0.02 THOUSAND/UL (ref 0–0.2)
IMM GRANULOCYTES NFR BLD AUTO: 0 % (ref 0–2)
INR PPP: 1.01 (ref 0.85–1.19)
LYMPHOCYTES # BLD AUTO: 1.32 THOUSANDS/ΜL (ref 0.6–4.47)
LYMPHOCYTES NFR BLD AUTO: 28 % (ref 14–44)
MAGNESIUM SERPL-MCNC: 2.4 MG/DL (ref 1.9–2.7)
MCH RBC QN AUTO: 32.4 PG (ref 26.8–34.3)
MCHC RBC AUTO-ENTMCNC: 32.7 G/DL (ref 31.4–37.4)
MCV RBC AUTO: 99 FL (ref 82–98)
MONOCYTES # BLD AUTO: 0.44 THOUSAND/ΜL (ref 0.17–1.22)
MONOCYTES NFR BLD AUTO: 9 % (ref 4–12)
NEUTROPHILS # BLD AUTO: 2.65 THOUSANDS/ΜL (ref 1.85–7.62)
NEUTS SEG NFR BLD AUTO: 55 % (ref 43–75)
NRBC BLD AUTO-RTO: 0 /100 WBCS
PHOSPHATE SERPL-MCNC: 4.8 MG/DL (ref 2.7–4.5)
PLATELET # BLD AUTO: 211 THOUSANDS/UL (ref 149–390)
PMV BLD AUTO: 10.5 FL (ref 8.9–12.7)
POTASSIUM SERPL-SCNC: 4.4 MMOL/L (ref 3.5–5.3)
PROT SERPL-MCNC: 5.8 G/DL (ref 6.4–8.4)
PROTHROMBIN TIME: 14 SECONDS (ref 12.3–15)
RBC # BLD AUTO: 3.36 MILLION/UL (ref 3.88–5.62)
SODIUM SERPL-SCNC: 142 MMOL/L (ref 135–147)
WBC # BLD AUTO: 4.8 THOUSAND/UL (ref 4.31–10.16)

## 2025-01-05 PROCEDURE — 85610 PROTHROMBIN TIME: CPT

## 2025-01-05 PROCEDURE — 99233 SBSQ HOSP IP/OBS HIGH 50: CPT | Performed by: INTERNAL MEDICINE

## 2025-01-05 PROCEDURE — 99223 1ST HOSP IP/OBS HIGH 75: CPT | Performed by: INTERNAL MEDICINE

## 2025-01-05 PROCEDURE — 84100 ASSAY OF PHOSPHORUS: CPT

## 2025-01-05 PROCEDURE — 80053 COMPREHEN METABOLIC PANEL: CPT

## 2025-01-05 PROCEDURE — 83735 ASSAY OF MAGNESIUM: CPT

## 2025-01-05 PROCEDURE — 85025 COMPLETE CBC W/AUTO DIFF WBC: CPT

## 2025-01-05 PROCEDURE — 82948 REAGENT STRIP/BLOOD GLUCOSE: CPT

## 2025-01-05 RX ORDER — CALCIUM ACETATE 667 MG/1
667 CAPSULE ORAL
Status: DISCONTINUED | OUTPATIENT
Start: 2025-01-05 | End: 2025-01-07 | Stop reason: HOSPADM

## 2025-01-05 RX ORDER — LOSARTAN POTASSIUM 50 MG/1
100 TABLET ORAL DAILY
Status: DISCONTINUED | OUTPATIENT
Start: 2025-01-05 | End: 2025-01-06

## 2025-01-05 RX ORDER — ACETAMINOPHEN 325 MG/1
650 TABLET ORAL EVERY 6 HOURS PRN
Status: DISCONTINUED | OUTPATIENT
Start: 2025-01-05 | End: 2025-01-07 | Stop reason: HOSPADM

## 2025-01-05 RX ORDER — HYDRALAZINE HYDROCHLORIDE 20 MG/ML
20 INJECTION INTRAMUSCULAR; INTRAVENOUS EVERY 6 HOURS PRN
Status: DISCONTINUED | OUTPATIENT
Start: 2025-01-05 | End: 2025-01-06

## 2025-01-05 RX ORDER — HEPARIN SODIUM 5000 [USP'U]/ML
5000 INJECTION, SOLUTION INTRAVENOUS; SUBCUTANEOUS EVERY 8 HOURS SCHEDULED
Status: DISCONTINUED | OUTPATIENT
Start: 2025-01-06 | End: 2025-01-07 | Stop reason: HOSPADM

## 2025-01-05 RX ADMIN — GABAPENTIN 200 MG: 100 CAPSULE ORAL at 21:27

## 2025-01-05 RX ADMIN — HYDRALAZINE HYDROCHLORIDE 50 MG: 25 TABLET ORAL at 05:47

## 2025-01-05 RX ADMIN — GABAPENTIN 100 MG: 100 CAPSULE ORAL at 05:47

## 2025-01-05 RX ADMIN — LABETALOL HYDROCHLORIDE 400 MG: 200 TABLET, FILM COATED ORAL at 08:40

## 2025-01-05 RX ADMIN — LOSARTAN POTASSIUM 100 MG: 50 TABLET, FILM COATED ORAL at 08:51

## 2025-01-05 RX ADMIN — HYDRALAZINE HYDROCHLORIDE 50 MG: 25 TABLET ORAL at 21:27

## 2025-01-05 RX ADMIN — NICARDIPINE HYDROCHLORIDE 2.5 MG/HR: 2.5 INJECTION, SOLUTION INTRAVENOUS at 13:27

## 2025-01-05 RX ADMIN — HYDRALAZINE HYDROCHLORIDE 50 MG: 25 TABLET ORAL at 13:26

## 2025-01-05 RX ADMIN — NICARDIPINE HYDROCHLORIDE 15 MG/HR: 2.5 INJECTION, SOLUTION INTRAVENOUS at 02:15

## 2025-01-05 RX ADMIN — ACETAMINOPHEN 650 MG: 325 TABLET, FILM COATED ORAL at 15:46

## 2025-01-05 RX ADMIN — NICARDIPINE HYDROCHLORIDE 12.5 MG/HR: 2.5 INJECTION, SOLUTION INTRAVENOUS at 08:39

## 2025-01-05 RX ADMIN — NICARDIPINE HYDROCHLORIDE 15 MG/HR: 2.5 INJECTION, SOLUTION INTRAVENOUS at 04:00

## 2025-01-05 RX ADMIN — ATORVASTATIN CALCIUM 40 MG: 40 TABLET, FILM COATED ORAL at 17:53

## 2025-01-05 RX ADMIN — CALCIUM ACETATE 667 MG: 667 CAPSULE ORAL at 08:51

## 2025-01-05 RX ADMIN — LABETALOL HYDROCHLORIDE 400 MG: 200 TABLET, FILM COATED ORAL at 21:28

## 2025-01-05 RX ADMIN — CHLORHEXIDINE GLUCONATE 15 ML: 1.2 RINSE ORAL at 08:41

## 2025-01-05 RX ADMIN — FAMOTIDINE 40 MG: 20 TABLET, FILM COATED ORAL at 08:40

## 2025-01-05 RX ADMIN — NIFEDIPINE 90 MG: 30 TABLET, FILM COATED, EXTENDED RELEASE ORAL at 08:40

## 2025-01-05 RX ADMIN — NICARDIPINE HYDROCHLORIDE 15 MG/HR: 2.5 INJECTION, SOLUTION INTRAVENOUS at 06:13

## 2025-01-05 RX ADMIN — LABETALOL HYDROCHLORIDE 400 MG: 200 TABLET, FILM COATED ORAL at 15:46

## 2025-01-05 RX ADMIN — ESCITALOPRAM OXALATE 20 MG: 20 TABLET ORAL at 08:40

## 2025-01-05 RX ADMIN — CALCIUM ACETATE 667 MG: 667 CAPSULE ORAL at 12:01

## 2025-01-05 RX ADMIN — ASPIRIN 81 MG: 81 TABLET, COATED ORAL at 08:40

## 2025-01-05 RX ADMIN — CALCIUM ACETATE 667 MG: 667 CAPSULE ORAL at 15:46

## 2025-01-05 RX ADMIN — ENOXAPARIN SODIUM 30 MG: 30 INJECTION SUBCUTANEOUS at 08:41

## 2025-01-05 RX ADMIN — CHLORHEXIDINE GLUCONATE 15 ML: 1.2 RINSE ORAL at 21:28

## 2025-01-05 RX ADMIN — CINACALCET 60 MG: 30 TABLET ORAL at 08:51

## 2025-01-05 NOTE — ASSESSMENT & PLAN NOTE
Outpatient Rx: Mircera 150 mcg every 2 weeks, IV Venofer 50 mg weekly  Hemoglobin on admission 11.8 at goal  Continue Mircera and Venofer per protocol as outpatient

## 2025-01-05 NOTE — PLAN OF CARE
Problem: INFECTION - ADULT  Goal: Absence or prevention of progression during hospitalization  Description: INTERVENTIONS:  - Assess and monitor for signs and symptoms of infection  - Monitor lab/diagnostic results  - Monitor all insertion sites, i.e. indwelling lines, tubes, and drains  - Monitor endotracheal if appropriate and nasal secretions for changes in amount and color  - Brinson appropriate cooling/warming therapies per order  - Administer medications as ordered  - Instruct and encourage patient and family to use good hand hygiene technique  - Identify and instruct in appropriate isolation precautions for identified infection/condition  Outcome: Progressing     Problem: INFECTION - ADULT  Goal: Absence of fever/infection during neutropenic period  Description: INTERVENTIONS:  - Monitor WBC    Outcome: Progressing     Problem: Knowledge Deficit  Goal: Patient/family/caregiver demonstrates understanding of disease process, treatment plan, medications, and discharge instructions  Description: Complete learning assessment and assess knowledge base.  Interventions:  - Provide teaching at level of understanding  - Provide teaching via preferred learning methods  Outcome: Progressing

## 2025-01-05 NOTE — ASSESSMENT & PLAN NOTE
Per [facility] records Home Rx: Hydralazine 50 mg 3 times daily, labetalol 400 mg 3 times daily, nifedipine 90 mg twice daily, olmesartan 40 mg daily  Current Rx: Hydralazine 50 mg 3 times daily, labetalol 400 mg 3 times daily, nifedipine 90 mg daily, olmesartan 40 mg daily  Status post nicardipine drip with improvement in blood pressure  Changes: Start weaning off nicardipine drip and continue oral antihypertensive regimen

## 2025-01-05 NOTE — ED ATTENDING ATTESTATION
1/4/2025  I, Dulce Mcguire MD, saw and evaluated the patient. I have discussed the patient with the resident/non-physician practitioner and agree with the resident's/non-physician practitioner's findings, Plan of Care, and MDM as documented in the resident's/non-physician practitioner's note, except where noted. All available labs and Radiology studies were reviewed.  I was present for key portions of any procedure(s) performed by the resident/non-physician practitioner and I was immediately available to provide assistance.       At this point I agree with the current assessment done in the Emergency Department.  I have conducted an independent evaluation of this patient a history and physical is as follows:    39-year-old presenting to the ER with a headache vomiting, feels warm.  No abdominal pain.  History of renal failure due to diabetes, last dialysis yesterday but did not complete it.  No chest pain or trouble breathing.  History of prior stroke.  Nonfocal neurological at this time.  Blood pressure is very elevated.  Agree with CT head to evaluate for bleed.  Agree with the cardiac workup ordered, concern for intracranial bleed versus hypertensive urgency/emergency.  Will treat blood pressure.    Blood pressure responding.  Cardene drip started.      ED Course         Critical Care Time  CriticalCare Time    Date/Time: 1/5/2025 12:22 AM    Performed by: Dulce Mcguire MD  Authorized by: Dulce Mcguire MD    Critical care provider statement:     Critical care time (minutes):  38    Critical care time was exclusive of:  Separately billable procedures and treating other patients and teaching time    Critical care was necessary to treat or prevent imminent or life-threatening deterioration of the following conditions:  CNS failure or compromise and cardiac failure    Critical care was time spent personally by me on the following activities:  Interpretation of cardiac output measurements, ordering and  performing treatments and interventions, obtaining history from patient or surrogate, ordering and review of laboratory studies, development of treatment plan with patient or surrogate, ordering and review of radiographic studies, re-evaluation of patient's condition, review of old charts, evaluation of patient's response to treatment and examination of patient

## 2025-01-05 NOTE — ASSESSMENT & PLAN NOTE
Monitor temperature  Patient received Tamiflu in ED  Continue Tamiflu  Tylenol as needed  Symptomatic care    Based on guidelines, CrCL calculated to 14  Patient should get 30mg after each dialysis session for five days  Patient received first dose in ED

## 2025-01-05 NOTE — ASSESSMENT & PLAN NOTE
"Lab Results   Component Value Date    HGBA1C 6.9 (H) 11/05/2024       No results for input(s): \"POCGLU\" in the last 72 hours.    Blood Sugar Average: Last 72 hrs:    Patient has insulin pump in place, requesting to remain on pump and not go to sliding scale insulin.    Patient's insulin pump orders confirmed.  Patient's insulin carrol  "

## 2025-01-05 NOTE — ASSESSMENT & PLAN NOTE
Patient has history of hypertension with end-stage renal disease.  Given 20 of labetalol and 10 of hydralazine in the ED with no relief.  Started on Cardene drip.    Plan  Continue Cardene drip and wean down as tolerated.  Continue home meds, patient tolerating p.o.

## 2025-01-05 NOTE — PROGRESS NOTES
Progress Note - Critical Care/ICU   Name: Mateus Mckeon 39 y.o. male I MRN: [id number]  Unit/Bed#: ICU 11 I Date of Admission: 1/4/2025   Date of Service: 1/5/2025 I Hospital Day: 1      Assessment & Plan  Hypertensive urgency  Patient has history of hypertension with end-stage renal disease.  Given 20 of labetalol and 10 of hydralazine in the ED with no relief.  Started on Cardene drip.    Plan  Continue Cardene drip and wean down as tolerated.  Continue home meds, patient tolerating p.o.  Type 1 diabetes mellitus (HCC)  Patient has a dexacom on monitoring BS, administering insulin   1/5 Mother called and reported that dexacom was reading in the 60's. RN performed POCT,     Plan  BS checks ordered   Patient continued dexacom and insulin administration   Monitor for signs and symptoms of hypoglycemia     Lab Results   Component Value Date    HGBA1C 6.9 (H) 11/05/2024       Recent Labs     01/05/25  0730   POCGLU 154*       Blood Sugar Average: Last 72 hrs:  (P) 154  Patient has insulin pump in place, requesting to remain on pump and not go to sliding scale insulin.    Patient's insulin pump orders confirmed.  Patient's insulin carrol  ESRD on hemodialysis (HCC)  Patient is MWF dialysis   Last dialysis was this past Friday     Plan  Plan for dialysis tomorrow   Nephrology consulted, appreciated recommendations     Lab Results   Component Value Date    EGFR 5 01/05/2025    EGFR 6 01/04/2025    EGFR 8 12/09/2024    CREATININE 9.71 (H) 01/05/2025    CREATININE 9.02 (H) 01/04/2025    CREATININE 7.36 (H) 12/09/2024      - Kidney function baseline appears to fluctuate between 7 and 10.   - Hemodialysis Monday Wednesday Fridays   - Nephrology consult placed  Influenza B  Monitor temperature  Patient received Tamiflu in ED  Continue Tamiflu  Tylenol as needed  Symptomatic care    Based on guidelines, CrCL calculated to 14  Patient should get 30mg after each dialysis session for five days  Patient received first dose  in ED  Anemia in chronic kidney disease, on chronic dialysis (HCC)  Lab Results   Component Value Date    EGFR 5 01/05/2025    EGFR 6 01/04/2025    EGFR 8 12/09/2024    CREATININE 9.71 (H) 01/05/2025    CREATININE 9.02 (H) 01/04/2025    CREATININE 7.36 (H) 12/09/2024     Disposition: Stepdown Level 1    ICU Core Measures     A: Assess, Prevent, and Manage Pain Has pain been assessed? Yes  Need for changes to pain regimen? No   B: Both SAT/SAT  N/A   C: Choice of Sedation RASS Goal: 0 Alert and Calm or N/A patient not on sedation  Need for changes to sedation or analgesia regimen? NA   D: Delirium CAM-ICU: Negative   E: Early Mobility  Plan for early mobility? Yes   F: Family Engagement Plan for family engagement today? Yes         Prophylaxis:  VTE VTE covered by:  enoxaparin, Subcutaneous, 30 mg at 01/05/25 0841       Stress Ulcer  covered byfamotidine (PEPCID) 40 MG tablet [[id number]] (Long-Term Med), famotidine (PEPCID) tablet 40 mg [[id number]]         24 Hour Events : Patient admitted to ICU overnight. Remained on Cardene gtt and home meds started for hypertension. No other overnight events reported by RN   Subjective   Review of Systems: Review of Systems   Constitutional: Negative.    HENT:  Positive for congestion.    Eyes: Negative.    Respiratory:  Positive for cough. Negative for chest tightness and shortness of breath.    Cardiovascular:  Negative for chest pain.   Gastrointestinal:  Negative for abdominal distention and abdominal pain.   Endocrine: Negative.    Genitourinary: Negative.    Neurological:  Negative for weakness, light-headedness and headaches.   Hematological: Negative.    Psychiatric/Behavioral: Negative.     All other systems reviewed and are negative.      Objective :                   Vitals I/O      Most Recent Min/Max in 24hrs   Temp 98.5 °F (36.9 °C) Temp  Min: 97.8 °F (36.6 °C)  Max: 98.5 °F (36.9 °C)   Pulse 80 Pulse  Min: 66  Max: 90   Resp (!) 39 Resp  Min: 11  Max: 39   BP  116/58 BP  Min: 116/58  Max: 251/114   O2 Sat 97 % SpO2  Min: 94 %  Max: 100 %      Intake/Output Summary (Last 24 hours) at 1/5/2025 1007  Last data filed at 1/5/2025 0613  Gross per 24 hour   Intake 380 ml   Output 0 ml   Net 380 ml       Diet Renal; Lo Phosphorus; Yes; Fluid Restriction 1500 ML; No    Invasive Monitoring           Physical Exam   Physical Exam  Eyes:      Pupils: Pupils are equal, round, and reactive to light.   Skin:     General: Skin is warm and dry.   HENT:      Head: Normocephalic.      Right Ear: Hearing normal.      Left Ear: Hearing normal.      Nose: Congestion present.      Mouth/Throat:      Mouth: Mucous membranes are moist.   Cardiovascular:      Rate and Rhythm: Normal rate and regular rhythm.      Heart sounds: Normal heart sounds.   Musculoskeletal:      Right lower leg: No edema.      Left lower leg: No edema.   Abdominal: General: Bowel sounds are normal.      Palpations: Abdomen is soft.   Constitutional:       General: He is not in acute distress.     Appearance: He is well-developed. He is not ill-appearing or toxic-appearing.   Pulmonary:      Effort: Pulmonary effort is normal.      Breath sounds: Normal breath sounds.   Neurological:      General: No focal deficit present.      Mental Status: He is alert and oriented to person, place and time. Mental status is at baseline.      Motor: Strength full and intact in all extremities.          Diagnostic Studies        Lab Results: I have reviewed the following results:     Medications:  Scheduled PRN   aspirin, 81 mg, Daily  atorvastatin, 40 mg, QPM  calcium acetate, 667 mg, TID With Meals  chlorhexidine, 15 mL, Q12H HALIE  cinacalcet, 60 mg, Daily  enoxaparin, 30 mg, Daily  escitalopram, 20 mg, Daily  famotidine, 40 mg, QAM  gabapentin, 100 mg, Early Morning  gabapentin, 200 mg, HS  hydrALAZINE, 50 mg, Q8H HALIE  labetalol, 400 mg, TID  losartan, 100 mg, Daily  NIFEdipine, 90 mg, Daily  patient maintained insulin pump, 1 each,  Q8H      hydrALAZINE, 20 mg, Q6H PRN  insulin aspart, 100 Units, Daily PRN  ondansetron, 4 mg, Q6H PRN       Continuous    niCARdipine, 1-15 mg/hr, Last Rate: 5 mg/hr (01/05/25 1002)         Labs:   CBC    Recent Labs     01/04/25 1935 01/04/25  2324 01/05/25  0555   WBC 5.30  --  4.80   HGB 11.8*  --  10.9*   HCT 36.4*  --  33.3*    189 211     BMP    Recent Labs     01/04/25 1935 01/05/25  0555   SODIUM 141 142   K 4.6 4.4   CL 98 101   CO2 32 30   AGAP 11 11   BUN 22 24   CREATININE 9.02* 9.71*   CALCIUM 9.1 8.5       Coags    Recent Labs     01/04/25 1935 01/05/25  0555   INR 0.90 1.01   PTT 31  --         Additional Electrolytes  Recent Labs     01/04/25 1935 01/05/25  0555   MG 2.5 2.4   PHOS  --  4.8*          Blood Gas    No recent results  No recent results LFTs  Recent Labs     01/04/25 1935 01/05/25  0555   ALT 12 9   AST 19 18   ALKPHOS 95 75   ALB 4.4 3.9   TBILI 0.59 0.50       Infectious  No recent results  Glucose  Recent Labs     01/04/25 1935 01/05/25  0555   GLUC 162* 144*

## 2025-01-05 NOTE — ASSESSMENT & PLAN NOTE
Lab Results   Component Value Date    EGFR 5 01/05/2025    EGFR 6 01/04/2025    EGFR 8 12/09/2024    CREATININE 9.71 (H) 01/05/2025    CREATININE 9.02 (H) 01/04/2025    CREATININE 7.36 (H) 12/09/2024

## 2025-01-05 NOTE — ASSESSMENT & PLAN NOTE
Lab Results   Component Value Date    EGFR 6 01/04/2025    EGFR 8 12/09/2024    EGFR 5 11/10/2024    CREATININE 9.02 (H) 01/04/2025    CREATININE 7.36 (H) 12/09/2024    CREATININE 10.61 (H) 11/10/2024      - Kidney function baseline appears to fluctuate between 7 and 10.   - Hemodialysis Monday Wednesday Fridays   - Nephrology consult placed

## 2025-01-05 NOTE — ASSESSMENT & PLAN NOTE
MWF at [facility] [place]  Access: Left forearm AV fistula  EDW: 89.4 kg  Electrolyte (K4.4) and volume status stable today  Last outpatient HD: Friday 2 hours, full treatment on Thursday due to holiday schedule  Next HD: Monday, 01/06

## 2025-01-05 NOTE — ED PROVIDER NOTES
"Time reflects when diagnosis was documented in both MDM as applicable and the Disposition within this note       Time User Action Codes Description Comment    1/4/2025 10:29 PM Walter Jeff Add [I16.1] Hypertensive emergency     1/4/2025 11:04 PM Holden Jimenez Add [N18.6,  Z99.2] ESRD on hemodialysis (HCC)     1/5/2025 12:30 AM Ozzy Jeffza Add [R51.9] Headache     1/5/2025 12:30 AM Ozzy Jeffza Add [R11.10] Vomiting     1/5/2025 12:30 AM Ozzy Jeffza Add [J10.1] Influenza B           ED Disposition       ED Disposition   Admit    Condition   Stable    Date/Time   Sat Jan 4, 2025 10:30 PM    Comment   Case was discussed with critical care and the patient's admission status was agreed to be Admission Status: inpatient status to the service of Dr. Echavarria .               Assessment & Plan       Medical Decision Making  Patient with history as below presented to triage with CC: \"Patient presents with:  Hypertension: Patient reports BP at home SBP >200 with accompanying HA, nausea/vomiting. Took additional hydralazine x1 and labetalol x2 @ 1600 w/o relief to symptoms.   Headache  \"  Hx obtained from pt and family    39-year-old male history of ESRD on dialysis secondary to type 1 diabetes, and history of hemorrhagic CVA present to the ED with elevated BP readings 230s/100s.  Complaining of headache with episode of vomiting.  Exam without focal neurologic deficits or meningeal signs.  Patient on multiple antihypertensives at home, with normal range in the 120s/80s.  No improvement despite early administration of his home labetalol/hydralazine.  Will evaluate with labs, EKG and CT imaging for signs of endorgan damage.  Plan to address BP with as needed labetalol/hydralazine and if needed Cardene gtt.  Given episode of vomiting, remote history of diarrhea and congestion on exam, will test for COVID/flu.  Given high risk if positive will treat with Tamiflu after discussion of risk versus benefits.    Differential " diagnosis including but not limited to: hypertension, hypertensive urgency, hypertensive crisis, malignant hypertension, end organ damage, renal failure, metabolic abnormality, intracranial process, ACS, MI; doubt pheochromocytoma.  Viral syndrome       I have independently ordered, reviewed and interpreted the following: labs and/or imaging and/or EKG studies listed below  Reviewed external records including notes, and prior labs/imaging results.    Disposition: Patient condition, stable.  Discussed need for admission with patient for further management and workup with pt and they are agreeable with plan. Discussed case with critical care, who agreed to admit patient to stepdown status.     See ED Course for further MDM.      PLEASE NOTE:  This encounter was completed utilizing the [employer] Direct Speech Voice Recognition Software. Grammatical errors, random word insertions, pronoun errors and incomplete sentences are occasional inherent consequences of the system due to software limitations, ambient noise and hardware issues.These may be missed by proof reading prior to affixing electronic signature. Any questions or concerns about the content, text or information contained within the body of this dictation should be directly addressed to the physician for clarification. Please do not hesitate to call me directly if you have any questions or concerns.      Amount and/or Complexity of Data Reviewed  External Data Reviewed: labs, ECG and notes.  Labs: ordered. Decision-making details documented in ED Course.  Radiology: ordered. Decision-making details documented in ED Course.  ECG/medicine tests: ordered and independent interpretation performed. Decision-making details documented in ED Course.    Risk  Prescription drug management.  Decision regarding hospitalization.        ED Course as of 01/05/25 0031   Sat Jan 04, 2025 1959 Temperature: 97.8 °F (36.6 °C)   1959 Blood Pressure(!)(S): 237/114   2003  Blood Pressure(!): 229/106   2022 hs TnI 0hr: 23 2022 Creatinine(!): 9.02  Known ESRD   2022 MAGNESIUM: 2.5  wnl   2023 CBC and differential(!)  No leukocytosis.  Mild anemia, stable and chronic likely in the setting of end-stage renal disease.  Platelets WNL.  No significant bandemia.   2023 Comprehensive metabolic panel(!)  No acute electrolyte abnormality.  ESRD, creatinine at baseline.  Glucose 162.  No acute LFT elevation.  No signs of acute endorgan damage on CMP.   2024 ECG 12 lead  Independently interpreted by me.  Normal sinus rhythm at 69.  Normal axis.  Normal intervals.  Nonspecific T wave inversion in V6.  Appears similar to prior EKG done on 12/26/24.   2032 Influenza B Rapid Antigen(!): Positive   2140 CT head without contrast  No acute intracranial abnormality.   2140 Blood Pressure(!): 223/107   2228 Delta 2hr hsTnI: -8   2234 Patient remains persistently hypertensive despite labetalol x 2, hydralazine x 1.  Will start on Cardene gtt.  Discussed case with critical care with regards to admission for further workup and management.  Patient agreeable with plan.  Shared decision making was made with regards to starting Tamiflu, given patient's multiple comorbidities, patient agreed with understanding of risk versus benefits.       Medications   niCARdipine (CARDENE) 25 mg (STANDARD CONCENTRATION) in sodium chloride 0.9% 250 mL (10 mg/hr Intravenous New Bag 1/4/25 2323)   chlorhexidine (PERIDEX) 0.12 % oral rinse 15 mL (has no administration in time range)   enoxaparin (LOVENOX) subcutaneous injection 30 mg (has no administration in time range)   aspirin (ECOTRIN LOW STRENGTH) EC tablet 81 mg (has no administration in time range)   atorvastatin (LIPITOR) tablet 40 mg (40 mg Oral Not Given 1/4/25 2300)   cinacalcet (SENSIPAR) tablet 60 mg (has no administration in time range)   escitalopram (LEXAPRO) tablet 20 mg (has no administration in time range)   famotidine (PEPCID) tablet 40 mg (has no  administration in time range)   gabapentin (NEURONTIN) capsule 100 mg (has no administration in time range)   gabapentin (NEURONTIN) capsule 200 mg (200 mg Oral Given 1/4/25 2256)   hydrALAZINE (APRESOLINE) tablet 50 mg (50 mg Oral Given 1/4/25 2258)   labetalol (NORMODYNE) tablet 400 mg (400 mg Oral Given 1/4/25 2257)   NIFEdipine (PROCARDIA XL) 24 hr tablet 90 mg (has no administration in time range)   olmesartan (BENICAR) tablet 40 mg (has no administration in time range)   labetalol (NORMODYNE) injection 10 mg (10 mg Intravenous Given 1/4/25 2016)   hydrALAZINE (APRESOLINE) injection 10 mg (10 mg Intravenous Given 1/4/25 2051)   oseltamivir (TAMIFLU) capsule 30 mg (30 mg Oral Given 1/4/25 2232)   labetalol (NORMODYNE) injection 10 mg (10 mg Intravenous Given 1/4/25 2157)       ED Risk Strat Scores                          SBIRT 20yo+      Flowsheet Row Most Recent Value   Initial Alcohol Screen: US AUDIT-C     1. How often do you have a drink containing alcohol? 0 Filed at: 01/04/2025 1936   2. How many drinks containing alcohol do you have on a typical day you are drinking?  0 Filed at: 01/04/2025 1936   3a. Male UNDER 65: How often do you have five or more drinks on one occasion? 0 Filed at: 01/04/2025 1936   3b. FEMALE Any Age, or MALE 65+: How often do you have 4 or more drinks on one occassion? 0 Filed at: 01/04/2025 1936   Audit-C Score 0 Filed at: 01/04/2025 1936   ALETHEA: How many times in the past year have you...    Used an illegal drug or used a prescription medication for non-medical reasons? Never Filed at: 01/04/2025 1936                            History of Present Illness       Chief Complaint   Patient presents with    Hypertension     Patient reports BP at home SBP >200 with accompanying HA, nausea/vomiting. Took additional hydralazine x1 and labetalol x2 @ 1600 w/o relief to symptoms.     Headache       Past Medical History:   Diagnosis Date    Acute kidney injury (HCC)     Ambulates with cane      Anuria     Anxiety     Cellulitis of right elbow 03/31/2021    Chronic kidney disease     COVID-19 10/18/2024    Depression     Diabetes mellitus (HCC)     Diarrhea     Emesis 10/24/2020    End stage renal disease (HCC) 02/11/2018    Formatting of this note might be different from the original. Last Assessment & Plan:  Secondary to DM.  On nightly PD.  Followed by [facility].  Patient considering transplant for kidney and pancreas through [facility] Formatting of this note might be different from the original. Last Assessment & Plan:  Formatting of this note might be different from the original. 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    Esophagitis 07/21/2015    Falls     Gastroparesis     GERD (gastroesophageal reflux disease)     History of shingles 2010    History of transfusion 02/2018    no adverse reaction    Hyperlipidemia     Hyperphosphatemia     Hypertension     Hypoglycemia 07/15/2022    Itching     Mastoiditis of right side 07/15/2022    Muscle weakness     general unsteadiness    Obesity (BMI 30.0-34.9) 09/09/2019    Orthostatic hypotension 10/25/2020    Peripheral polyneuropathy 11/20/2019    PONV (postoperative nausea and vomiting) 01/26/2018    Protein-calorie malnutrition (HCC) 11/23/2020    Recurrent peritonitis (HCC) due to peritoneal dialysis catheter 07/31/2020    Retinopathy     Seizures (HCC)     early 2020 - one time    Skin abnormality     some dime size areas where skin was scratched from itching    Sleep apnea     Spontaneous bacterial peritonitis (HCC) 10/19/2020    Squamous cell skin cancer     left temple    Stroke (HCC)     x2 - off balance/no driving/fatigue    Swelling of both lower extremities     Swelling of joint of upper arm, right 04/03/2024    Traumatic onycholysis 07/21/2022    Vomiting     Wears glasses     Word finding difficulty 11/05/2024      Past Surgical History:   Procedure Laterality Date    CARDIAC ELECTROPHYSIOLOGY PROCEDURE N/A  9/21/2023    Procedure: Cardiac loop recorder explant;  Surgeon: Parish Morgan MD;  Location: [facility];  Service: Cardiology    CARDIAC LOOP RECORDER  05/2018    COLONOSCOPY      EGD      EYE SURGERY Right     HEMODIALYSIS ADULT  11/6/2024    IR AV FISTULAGRAM/GRAFTOGRAM  02/23/2021    IR CEREBRAL ANGIOGRAPHY  1/12/2024    IR CEREBRAL ANGIOGRAPHY / INTERVENTION  1/5/2024    IR TUNNELED CENTRAL LINE PLACEMENT  02/16/2021    IR TUNNELED DIALYSIS CATHETER PLACEMENT  11/18/2020    IR TUNNELED DIALYSIS CATHETER REMOVAL  02/12/2021    IR TUNNELED DIALYSIS CATHETER REMOVAL  03/11/2021    MOHS SURGERY Left 12/14/2022    Left temple with Dr. Hassan    PERITONEAL CATHETER INSERTION N/A 08/27/2018    Procedure: UNROOF PD CATHETER;  Surgeon: Felipe Lindo DO;  Location: [facility];  Service: General    AL ARTERIOVENOUS ANASTOMOSIS OPEN DIRECT Left 11/09/2020    Procedure: CREATION FISTULA  ARTERIOVENOUS (AV) - LEFT WRIST;  Surgeon: Placido Altamirano MD;  Location: [facility];  Service: Vascular    AL ESOPHAGOGASTRODUODENOSCOPY TRANSORAL DIAGNOSTIC N/A 04/18/2019    Procedure: ESOPHAGOGASTRODUODENOSCOPY (EGD);  Surgeon: Ale Figueroa MD;  Location: [facility];  Service: Gastroenterology    AL LAPS INSERTION TUNNELED INTRAPERITONEAL CATHETER N/A 08/06/2018    Procedure: LAPAROSCOPIC PD CATHETER PLACEMENT;  Surgeon: Felipe Lindo DO;  [facility];  Service: General    AL REMOVAL TUNNELED INTRAPERITONEAL CATHETER N/A 11/18/2020    Procedure: REMOVAL CATHETER PERITONEAL DIALYSIS;  Surgeon: Abdifatah Ty MD;  Location[facility];  Service: General    TONSILLECTOMY      UPPER GASTROINTESTINAL ENDOSCOPY        Family History   Problem Relation Age of Onset    Breast cancer Mother     Hypertension Mother     Hyperlipidemia Father     Hypertension Father     Leukemia Maternal Grandmother     Hyperlipidemia Maternal Grandfather     Hypertension Maternal Grandfather     Hyperlipidemia Paternal Grandmother      "Hypertension Paternal Grandmother     Heart disease Paternal Grandfather         cardiac disorder    Diabetes Paternal Grandfather       Social History     Tobacco Use    Smoking status: Former     Current packs/day: 0.00     Average packs/day: 0.5 packs/day for 12.0 years (6.0 ttl pk-yrs)     Types: Cigarettes     Start date: 2006     Quit date: 2018     Years since quittin.9    Smokeless tobacco: Never    Tobacco comments:     quit 2018   Vaping Use    Vaping status: Every Day    Substances: THC, CBD   Substance Use Topics    Alcohol use: Not Currently    Drug use: Yes     Types: Marijuana     Comment: medical marijuana last vaped 2024      E-Cigarette/Vaping    E-Cigarette Use Current Every Day User     Comments medical marijuana       E-Cigarette/Vaping Substances    Nicotine No     THC Yes     CBD Yes     Flavoring No     Other No     Unknown No       I have reviewed and agree with the history as documented.     39-year-old male with history of insulin-dependent type 1 diabetes, ESRD (secondary to DM) on dialysis MWF, and prior history of hemorrhagic CVA presented to the ED with complaints of elevated BP readings at home along with gradual onset diffuse moderate to severe headache and episode of vomiting.  States he got \"light\" dialysis treatment yesterday due to the holidays, since then he has been noting his blood pressure has been elevated into the 200s systolics.  He has had associated gradual onset headache with this, along with episode of vomiting just prior to arrival to the ED.  Otherwise denies fever, chills, congestion, sore throat, abdominal pain, nausea, bloody emesis, bloody/tarry stool, constipation, chest pain, shortness of breath back pain, dysuria, leg pain, leg swelling, rash neck pain, neck stiffness.  No visual changes, slurred speech, focal unilateral deficits/weakness, dizziness, lightheadedness, syncope, facial asymmetry.  He reports prior history of hemorrhagic stroke " secondary to high blood pressure in the past.  Is currently managed on multiple antihypertensives, and took his nighttime labetalol and hydralazine early without relief.        Review of Systems   Constitutional:  Negative for chills and fever.   HENT:  Negative for congestion, ear pain and sore throat.    Eyes:  Negative for photophobia, pain, redness and visual disturbance.   Respiratory:  Negative for cough, chest tightness and shortness of breath.    Cardiovascular:  Negative for chest pain, palpitations and leg swelling.   Gastrointestinal:  Positive for vomiting. Negative for abdominal pain, constipation, diarrhea and nausea.   Genitourinary:  Negative for dysuria, flank pain, frequency, hematuria, testicular pain and urgency.   Musculoskeletal:  Negative for arthralgias, back pain, neck pain and neck stiffness.   Skin:  Negative for color change and rash.   Neurological:  Positive for headaches. Negative for dizziness, seizures, syncope, light-headedness and numbness.   All other systems reviewed and are negative.          Objective       ED Triage Vitals   Temperature Pulse Blood Pressure Respirations SpO2 Patient Position - Orthostatic VS   01/04/25 1942 01/04/25 1938 01/04/25 1938 01/04/25 1938 01/04/25 1938 01/04/25 1938   97.8 °F (36.6 °C) 72 (S) (!) 225/110 20 100 % Sitting      Temp Source Heart Rate Source BP Location FiO2 (%) Pain Score    01/04/25 1942 01/04/25 1938 01/04/25 1938 -- 01/04/25 1938    Oral Monitor Right arm  No Pain      Vitals      Date and Time Temp Pulse SpO2 Resp BP Pain Score FACES Pain Rating User   01/05/25 0019 97.9 °F (36.6 °C) -- -- -- -- No Pain -- KD   01/05/25 0015 -- 82 96 % 38 145/67 -- -- KD   01/05/25 0000 -- 83 95 % 22 157/73 -- -- KD   01/04/25 2359 -- 83 -- -- 162/76 -- -- KD   01/04/25 2355 -- 82 95 % 22 -- No Pain -- KD   01/04/25 2340 97.9 °F (36.6 °C) 84 98 % 17 165/91 -- -- KD   01/04/25 2330 -- -- -- -- 170/79 -- -- MANDEEP   01/04/25 2315 -- 79 98 % 20 178/69 --  --    01/04/25 2300 -- 77 -- -- 202/92 -- --    01/04/25 2257 -- 80 -- -- 210/100 -- --    01/04/25 2234 -- 76 -- -- 217/109 -- --    01/04/25 2216 -- -- -- -- 219/104 -- -- HA   01/04/25 2215 -- 74 95 % -- 219/104 -- --    01/04/25 2200 -- 72 100 % 20 221/107 -- --    01/04/25 2154 -- 74 100 % -- 224/108 -- --    01/04/25 2130 -- 71 98 % -- 223/107 -- --    01/04/25 2100 -- 69 99 % 20 223/103 -- --    01/04/25 2051 -- -- -- -- 251/114 -- --    01/04/25 2045 -- 66 99 % -- 237/114 -- --    01/04/25 2016 -- -- -- -- 231/112 -- --    01/04/25 2000 -- 68 99 % -- 229/106 -- --    01/04/25 1950 -- 70 98 % 20  237/114 ED resident Walter notified and at bedside -- --    01/04/25 1942 97.8 °F (36.6 °C) -- -- -- -- -- --    01/04/25 1938 -- 72 100 % 20  225/110 No Pain -- SB            Physical Exam  Vitals and nursing note reviewed.   Constitutional:       General: He is in acute distress.      Appearance: He is well-developed. He is ill-appearing. He is not toxic-appearing.   HENT:      Head: Normocephalic and atraumatic.      Right Ear: External ear normal.      Left Ear: External ear normal.      Nose: Congestion present.      Mouth/Throat:      Mouth: Mucous membranes are moist.      Pharynx: Oropharynx is clear. No oropharyngeal exudate or posterior oropharyngeal erythema.   Eyes:      Extraocular Movements: Extraocular movements intact.      Conjunctiva/sclera: Conjunctivae normal.      Pupils: Pupils are equal, round, and reactive to light.   Cardiovascular:      Rate and Rhythm: Normal rate and regular rhythm.      Pulses: Normal pulses.      Heart sounds: Normal heart sounds. No murmur heard.     No friction rub. No gallop.   Pulmonary:      Effort: Pulmonary effort is normal. No respiratory distress.      Breath sounds: Normal breath sounds. No stridor. No wheezing, rhonchi or rales.   Abdominal:      General: Bowel sounds are normal.      Palpations: Abdomen is soft.      Tenderness:  There is no abdominal tenderness. There is no right CVA tenderness, left CVA tenderness, guarding or rebound.   Musculoskeletal:         General: No swelling, tenderness or deformity. Normal range of motion.      Cervical back: Normal range of motion and neck supple. No rigidity or tenderness.      Right lower leg: No edema.      Left lower leg: No edema.   Lymphadenopathy:      Cervical: No cervical adenopathy.   Skin:     General: Skin is warm and dry.      Capillary Refill: Capillary refill takes less than 2 seconds.      Coloration: Skin is not jaundiced or pale.      Findings: No bruising, erythema, lesion or rash.   Neurological:      General: No focal deficit present.      Mental Status: He is alert and oriented to person, place, and time. Mental status is at baseline.      GCS: GCS eye subscore is 4. GCS verbal subscore is 5. GCS motor subscore is 6.      Cranial Nerves: Cranial nerves 2-12 are intact. No cranial nerve deficit, dysarthria or facial asymmetry.      Sensory: Sensation is intact. No sensory deficit.      Motor: Motor function is intact. No abnormal muscle tone or pronator drift.      Coordination: Coordination is intact.      Gait: Gait is intact.   Psychiatric:         Mood and Affect: Mood normal.         Behavior: Behavior normal.         Thought Content: Thought content normal.         Results Reviewed       Procedure Component Value Units Date/Time    HS Troponin I 4hr [[id number]]  (Normal) Collected: 01/04/25 2324    Lab Status: Final result Specimen: Blood from Hand, Right Updated: 01/04/25 2358     hs TnI 4hr 17 ng/L      Delta 4hr hsTnI -6 ng/L     Platelet count [[id number]]  (Normal) Collected: 01/04/25 2324    Lab Status: Final result Specimen: Blood from Hand, Right Updated: 01/04/25 2334     Platelets 189 Thousands/uL      MPV 10.0 fL     HS Troponin I 2hr [[id number]]  (Normal) Collected: 01/04/25 2156    Lab Status: Final result Specimen: Blood from Arm, Right Updated: 01/04/25  2228     hs TnI 2hr 15 ng/L      Delta 2hr hsTnI -8 ng/L     FLU/COVID Rapid Antigen (30 min. TAT) - Preferred screening test in ED [[id number]]  (Abnormal) Collected: 01/04/25 2010    Lab Status: Final result Specimen: Nares from Nose Updated: 01/04/25 2031     SARS COV Rapid Antigen Negative     Influenza A Rapid Antigen Negative     Influenza B Rapid Antigen Positive    Narrative:      This test has been performed using the [employer] Jodie 2 FLU+SARS Antigen test under the Emergency Use Authorization (EUA). This test has been validated by the  and verified by the performing laboratory. The Jodie uses lateral flow immunofluorescent sandwich assay to detect SARS-COV, Influenza A and Influenza B Antigen.     The Quidel Jodie 2 SARS Antigen test does not differentiate between SARS-CoV and SARS-CoV-2.     Negative results are presumptive and may be confirmed with a molecular assay, if necessary, for patient management. Negative results do not rule out SARS-CoV-2 or influenza infection and should not be used as the sole basis for treatment or patient management decisions. A negative test result may occur if the level of antigen in a sample is below the limit of detection of this test.     Positive results are indicative of the presence of viral antigens, but do not rule out bacterial infection or co-infection with other viruses.     All test results should be used as an adjunct to clinical observations and other information available to the provider.    FOR PEDIATRIC PATIENTS - copy/paste COVID Guidelines URL to browser: [email or web address]    HS Troponin 0hr (reflex protocol) [[id number]]  (Normal) Collected: 01/04/25 1935    Lab Status: Final result Specimen: Blood from Arm, Right Updated: 01/04/25 2020     hs TnI 0hr 23 ng/L     B-Type Natriuretic Peptide(BNP) [[id number]]  (Abnormal) Collected: 01/04/25 1935    Lab Status: Final result Specimen: Blood  from Arm, Right Updated: 01/04/25 2019      pg/mL     Comprehensive metabolic panel [[id number]]  (Abnormal) Collected: 01/04/25 1935    Lab Status: Final result Specimen: Blood from Arm, Right Updated: 01/04/25 2017     Sodium 141 mmol/L      Potassium 4.6 mmol/L      Chloride 98 mmol/L      CO2 32 mmol/L      ANION GAP 11 mmol/L      BUN 22 mg/dL      Creatinine 9.02 mg/dL      Glucose 162 mg/dL      Calcium 9.1 mg/dL      AST 19 U/L      ALT 12 U/L      Alkaline Phosphatase 95 U/L      Total Protein 6.6 g/dL      Albumin 4.4 g/dL      Total Bilirubin 0.59 mg/dL      eGFR 6 ml/min/1.73sq m     Narrative:      National Kid[employer] Foundation guidelines for Chronic Kidney Disease (CKD):     Stage 1 with normal or high GFR (GFR > 90 mL/min/1.73 square meters)    Stage 2 Mild CKD (GFR = 60-89 mL/min/1.73 square meters)    Stage 3A Moderate CKD (GFR = 45-59 mL/min/1.73 square meters)    Stage 3B Moderate CKD (GFR = 30-44 mL/min/1.73 square meters)    Stage 4 Severe CKD (GFR = 15-29 mL/min/1.73 square meters)    Stage 5 End Stage CKD (GFR <15 mL/min/1.73 square meters)  Note: GFR calculation is accurate only with a steady state creatinine    Magnesium [[id number]]  (Normal) Collected: 01/04/25 1935    Lab Status: Final result Specimen: Blood from Arm, Right Updated: 01/04/25 2017     Magnesium 2.5 mg/dL     Protime-INR [[id number]]  (Normal) Collected: 01/04/25 1935    Lab Status: Final result Specimen: Blood from Arm, Right Updated: 01/04/25 2011     Protime 12.9 seconds      INR 0.90    Narrative:      INR Therapeutic Range    Indication                                             INR Range      Atrial Fibrillation                                               2.0-3.0  Hypercoagulable State                                    2.0.2.3  Left Ventricular Asist Device                            2.0-3.0  Mechanical Heart Valve                                  -    Aortic(with afib, MI, embolism, HF, LA enlargement,     and/or coagulopathy)                                     2.0-3.0 (2.5-3.5)     Mitral                                                             2.5-3.5  Prosthetic/Bioprosthetic Heart Valve               2.0-3.0  Venous thromboembolism (VTE: VT, PE        2.0-3.0    APTT [[id number]]  (Normal) Collected: 01/04/25 1935    Lab Status: Final result Specimen: Blood from Arm, Right Updated: 01/04/25 2011     PTT 31 seconds     CBC and differential [[id number]]  (Abnormal) Collected: 01/04/25 1935    Lab Status: Final result Specimen: Blood from Arm, Right Updated: 01/04/25 1948     WBC 5.30 Thousand/uL      RBC 3.65 Million/uL      Hemoglobin 11.8 g/dL      Hematocrit 36.4 %       fL      MCH 32.3 pg      MCHC 32.4 g/dL      RDW 14.7 %      MPV 10.0 fL      Platelets 227 Thousands/uL      nRBC 0 /100 WBCs      Segmented % 59 %      Immature Grans % 1 %      Lymphocytes % 26 %      Monocytes % 7 %      Eosinophils Relative 6 %      Basophils Relative 1 %      Absolute Neutrophils 3.16 Thousands/µL      Absolute Immature Grans 0.03 Thousand/uL      Absolute Lymphocytes 1.37 Thousands/µL      Absolute Monocytes 0.36 Thousand/µL      Eosinophils Absolute 0.33 Thousand/µL      Basophils Absolute 0.05 Thousands/µL             CT head without contrast   Final Interpretation by Garland Caro DO (01/04 2137)      No acute intracranial abnormality.                  Workstation performed: [id number]             ECG 12 Lead Documentation Only    Date/Time: 1/4/2025 8:24 PM    Performed by: Walter Jeff DO  Authorized by: Walter Jeff DO    Indications / Diagnosis:  Hypertensive emergency.  ECG reviewed by me, the ED Provider: yes    Patient location:  ED  Previous ECG:     Previous ECG:  Compared to current    Comparison ECG info:  12/26/2024.    Similarity:  Changes noted  Interpretation:     Interpretation: normal    Rate:     ECG rate:  69  Rhythm:     Rhythm: sinus rhythm    Ectopy:     Ectopy: none    QRS:     QRS axis:   Normal    QRS intervals:  Normal  Conduction:     Conduction: normal    ST segments:     ST segments:  Normal  T waves:     T waves: non-specific and inverted      Inverted:  V6  CriticalCare Time    Date/Time: 1/4/2025 8:03 PM    Performed by: Walter Jeff DO  Authorized by: Walter Jeff DO    Critical care provider statement:     Critical care time was exclusive of:  Separately billable procedures and treating other patients and teaching time    Critical care was necessary to treat or prevent imminent or life-threatening deterioration of the following conditions: Hypertensive emergency.    Critical care was time spent personally by me on the following activities:  Obtaining history from patient or surrogate, development of treatment plan with patient or surrogate, evaluation of patient's response to treatment, examination of patient, interpretation of cardiac output measurements, ordering and performing treatments and interventions, ordering and review of laboratory studies, ordering and review of radiographic studies, re-evaluation of patient's condition, review of old charts and discussions with consultants      ED Medication and Procedure Management   Prior to Admission Medications   Prescriptions Last Dose Informant Patient Reported? Taking?   GLUCAGON EMERGENCY 1 MG injection  Self, Family Member Yes No   Gvoke HypoPen 1-Pack 1 MG/0.2ML SOAJ  Self, Family Member Yes No   Sig: as needed   Insulin Disposable Pump (Omnipod 5 G6 Intro, Gen 5,) KIT  Self, Family Member Yes No   Insulin Disposable Pump (Omnipod 5 G6 Pod, Gen 5,) MISC  Self, Family Member Yes No   NIFEdipine (PROCARDIA XL) 90 mg 24 hr tablet  Self, Family Member Yes No   Sig: Take 120 mg by mouth daily   NovoLOG 100 UNIT/ML injection  Self, Family Member Yes No   Patient not taking: Reported on 11/19/2024   Patiromer Sorbitex Calcium (VELTASSA PO)  Self, Family Member Yes No   Sig: Take 5 g by mouth once a week   SPS 15 GM/60ML suspension  Self,  Family Member Yes No   Sig: TAKE 60 ML BY MOUTH SINGLE DOSE FOR EMERGENCY USE DURING MISSED HEMODIALYSIS   aspirin (ECOTRIN LOW STRENGTH) 81 mg EC tablet  Self, Family Member Yes No   Sig: Take 81 mg by mouth daily   atorvastatin (LIPITOR) 40 mg tablet  Self, Family Member No No   Sig: TAKE 1 TABLET BY MOUTH ONCE DAILY IN THE EVENING   b complex vitamins capsule  Self, Family Member Yes No   Sig: Take 1 capsule by mouth daily before lunch    calcitriol (ROCALTROL) 0.25 mcg capsule  Family Member No No   Sig: Take 3 capsules (0.75 mcg total) by mouth 3 (three) times a week   Patient not taking: Reported on 11/19/2024   calcium acetate (PHOSLO) capsule  Self, Family Member No No   Sig: Take 1 capsule (667 mg total) by mouth 3 (three) times a day   cinacalcet (SENSIPAR) 60 MG tablet  Self, Family Member No No   Sig: Take 1 tablet (60 mg total) by mouth daily   escitalopram (LEXAPRO) 20 mg tablet   No No   Sig: Take 1 tablet by mouth once daily   famotidine (PEPCID) 40 MG tablet  Family Member No No   Sig: TAKE 1 TABLET BY MOUTH IN THE MORNING   gabapentin (NEURONTIN) 100 mg capsule  Family Member No No   Sig: TAKE 1 CAPSULE BY MOUTH IN THE MORNING AND 2 AT BEDTIME   hydrALAZINE (APRESOLINE) 50 mg tablet  Family Member No No   Sig: Take 1 tablet (50 mg total) by mouth every 8 (eight) hours Holding hydralazine if your systolic blood pressure <150   hydrOXYzine HCL (ATARAX) 10 mg tablet  Self, Family Member No No   Sig: Take 1 tablet (10 mg total) by mouth every 6 (six) hours as needed for itching or anxiety   hyoscyamine (ANASPAZ,LEVSIN) 0.125 MG tablet  Family Member No No   Sig: Take 1 tablet (0.125 mg total) by mouth every 4 (four) hours as needed for cramping   labetalol (NORMODYNE) 200 mg tablet  Self, Family Member No No   Sig: Take 2 tablets (400 mg total) by mouth 3 (three) times a day   Patient taking differently: Take 400 mg by mouth 3 (three) times a day 200mg in AM, 400mg at lunch and dinner   olmesartan  (BENICAR) 40 mg tablet  Self, Family Member Yes No   Sig: Take 40 mg by mouth daily   ondansetron (ZOFRAN) 4 mg tablet  Self, Family Member No No   Sig: Take 1 tablet (4 mg total) by mouth every 8 (eight) hours as needed for nausea or vomiting   ondansetron (ZOFRAN) 4 mg tablet  Family Member No No   Sig: Take 1 tablet (4 mg total) by mouth every 6 (six) hours   Patient not taking: Reported on 12/26/2024   traZODone (DESYREL) 50 mg tablet  Family Member No No   Sig: TAKE 1/2 (ONE-HALF) TABLET BY MOUTH ONCE DAILY AT BEDTIME AS NEEDED FOR SLEEP      Facility-Administered Medications: None     Current Discharge Medication List        CONTINUE these medications which have NOT CHANGED    Details   aspirin (ECOTRIN LOW STRENGTH) 81 mg EC tablet Take 81 mg by mouth daily      atorvastatin (LIPITOR) 40 mg tablet TAKE 1 TABLET BY MOUTH ONCE DAILY IN THE EVENING  Qty: 90 tablet, Refills: 1    Associated Diagnoses: Blurred vision; Cerebrovascular accident (CVA), unspecified mechanism ([facility]); Optic neuritis due to multiple sclerosis ([facility]); Hypertensive urgency; Acute renal failure with acute tubular necrosis superimposed on stage 2 chronic kidney disease  ([facility]); Acute renal failure with acute tubular necrosis superimposed on stage 3 chronic kidney disease ([facility]); Azotemia; Hyperphosphatemia; Persistent proteinuria; Vitamin D deficiency; Cerebrovascular accident (CVA) of left pontine structure ([facility]); Type 1 diabetes mellitus with diabetic nephropathy, with long-term current use of insulin ([facility]); History of lacunar cerebrovascular accident (CVA); Nausea; 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; Hyperhomocysteinemia ([facility]); Binocular visual disturbance      b complex vitamins capsule Take 1 capsule by mouth daily before lunch       calcitriol (ROCALTROL) 0.25 mcg capsule Take 3 capsules (0.75 mcg total) by mouth 3 (three) times a week  Qty: 90 capsule, Refills: 0    Associated Diagnoses: ESRD on hemodialysis ([facility]);  Chronic kidney disease-mineral and bone disorder      calcium acetate (PHOSLO) capsule Take 1 capsule (667 mg total) by mouth 3 (three) times a day  Qty: 90 capsule, Refills: 0    Associated Diagnoses: End stage kidney disease (HCC)      cinacalcet (SENSIPAR) 60 MG tablet Take 1 tablet (60 mg total) by mouth daily  Qty: 30 tablet, Refills: 0    Associated Diagnoses: End stage kidney disease (HCC)      escitalopram (LEXAPRO) 20 mg tablet Take 1 tablet by mouth once daily  Qty: 90 tablet, Refills: 0    Associated Diagnoses: Moderate episode of recurrent major depressive disorder (HCC)      famotidine (PEPCID) 40 MG tablet TAKE 1 TABLET BY MOUTH IN THE MORNING  Qty: 90 tablet, Refills: 1    Associated Diagnoses: Gastroesophageal reflux disease without esophagitis      gabapentin (NEURONTIN) 100 mg capsule TAKE 1 CAPSULE BY MOUTH IN THE MORNING AND 2 AT BEDTIME  Qty: 90 capsule, Refills: 5    Associated Diagnoses: Diabetic polyneuropathy associated with type 1 diabetes mellitus (HCC); Right upper extremity numbness      GLUCAGON EMERGENCY 1 MG injection Refills: 3      Gvoke HypoPen 1-Pack 1 MG/0.2ML SOAJ as needed      hydrALAZINE (APRESOLINE) 50 mg tablet Take 1 tablet (50 mg total) by mouth every 8 (eight) hours Holding hydralazine if your systolic blood pressure <150  Qty: 90 tablet, Refills: 0    Associated Diagnoses: Hypertensive emergency      hydrOXYzine HCL (ATARAX) 10 mg tablet Take 1 tablet (10 mg total) by mouth every 6 (six) hours as needed for itching or anxiety  Qty: 30 tablet, Refills: 3    Associated Diagnoses: Mild episode of recurrent major depressive disorder (HCC); Generalized anxiety disorder; Pruritus      hyoscyamine (ANASPAZ,LEVSIN) 0.125 MG tablet Take 1 tablet (0.125 mg total) by mouth every 4 (four) hours as needed for cramping  Qty: 30 tablet, Refills: 0    Associated Diagnoses: Gastroenteritis      Insulin Disposable Pump (Omnipod 5 G6 Intro, Gen 5,) KIT       Insulin Disposable Pump  (Omnipod 5 G6 Pod, Gen 5,) MISC       labetalol (NORMODYNE) 200 mg tablet Take 2 tablets (400 mg total) by mouth 3 (three) times a day  Qty: 180 tablet, Refills: 0    Associated Diagnoses: Essential (primary) hypertension      NIFEdipine (PROCARDIA XL) 90 mg 24 hr tablet Take 120 mg by mouth daily      NovoLOG 100 UNIT/ML injection       olmesartan (BENICAR) 40 mg tablet Take 40 mg by mouth daily      !! ondansetron (ZOFRAN) 4 mg tablet Take 1 tablet (4 mg total) by mouth every 8 (eight) hours as needed for nausea or vomiting  Qty: 90 tablet, Refills: 1    Associated Diagnoses: Vertigo      !! ondansetron (ZOFRAN) 4 mg tablet Take 1 tablet (4 mg total) by mouth every 6 (six) hours  Qty: 12 tablet, Refills: 0    Associated Diagnoses: Gastroenteritis      Patiromer Sorbitex Calcium (VELTASSA PO) Take 5 g by mouth once a week      SPS 15 GM/60ML suspension TAKE 60 ML BY MOUTH SINGLE DOSE FOR EMERGENCY USE DURING MISSED HEMODIALYSIS      traZODone (DESYREL) 50 mg tablet TAKE 1/2 (ONE-HALF) TABLET BY MOUTH ONCE DAILY AT BEDTIME AS NEEDED FOR SLEEP  Qty: 30 tablet, Refills: 0    Associated Diagnoses: Mild episode of recurrent major depressive disorder (HCC); Generalized anxiety disorder       !! - Potential duplicate medications found. Please discuss with provider.        No discharge procedures on file.  ED SEPSIS DOCUMENTATION   Time reflects when diagnosis was documented in both MDM as applicable and the Disposition within this note       Time User Action Codes Description Comment    1/4/2025 10:29 PM Walter Jeff [I16.1] Hypertensive emergency     1/4/2025 11:04 PM Holden Jimenez Add [N18.6,  Z99.2] ESRD on hemodialysis (HCC)     1/5/2025 12:30 AM Walter Jeff [R51.9] Headache     1/5/2025 12:30 AM Walter Jeff [R11.10] Vomiting     1/5/2025 12:30 AM Walter Jeff [J10.1] Influenza B                  Walter Jeff DO  01/05/25 0031

## 2025-01-05 NOTE — ASSESSMENT & PLAN NOTE
Patient is MWF dialysis   Last dialysis was this past Friday     Plan  Plan for dialysis tomorrow   Nephrology consulted, appreciated recommendations     Lab Results   Component Value Date    EGFR 5 01/05/2025    EGFR 6 01/04/2025    EGFR 8 12/09/2024    CREATININE 9.71 (H) 01/05/2025    CREATININE 9.02 (H) 01/04/2025    CREATININE 7.36 (H) 12/09/2024      - Kidney function baseline appears to fluctuate between 7 and 10.   - Hemodialysis Monday Wednesday Fridays   - Nephrology consult placed

## 2025-01-05 NOTE — ASSESSMENT & PLAN NOTE
Patient has a dexacom on monitoring BS, administering insulin   1/5 Mother called and reported that dexacom was reading in the 60's. RN performed POCT,     Plan  BS checks ordered   Patient continued dexacom and insulin administration   Monitor for signs and symptoms of hypoglycemia     Lab Results   Component Value Date    HGBA1C 6.9 (H) 11/05/2024       Recent Labs     01/05/25  0730   POCGLU 154*       Blood Sugar Average: Last 72 hrs:  (P) 154  Patient has insulin pump in place, requesting to remain on pump and not go to sliding scale insulin.    Patient's insulin pump orders confirmed.  Patient's insulin carrol

## 2025-01-05 NOTE — ASSESSMENT & PLAN NOTE
Patient has history of hypertension with end-stage renal disease.  Given 20 of labetalol and 10 of hydralazine in the ED with no relief.  Started on Cardene drip.    Continue Cardene drip and wean down as tolerated.  Continue home meds, patient tolerating p.o.

## 2025-01-05 NOTE — H&P
"H&P - Critical Care/ICU   Name: Mateus Mckeon 39 y.o. male I MRN: [id number]  Unit/Bed#: ICU 11 I Date of Admission: 1/4/2025   Date of Service: 1/4/2025 I Hospital Day: 0       Assessment & Plan  Hypertensive urgency  Patient has history of hypertension with end-stage renal disease.  Given 20 of labetalol and 10 of hydralazine in the ED with no relief.  Started on Cardene drip.    Continue Cardene drip and wean down as tolerated.  Continue home meds, patient tolerating p.o.  Type 1 diabetes mellitus (HCC)  Lab Results   Component Value Date    HGBA1C 6.9 (H) 11/05/2024       No results for input(s): \"POCGLU\" in the last 72 hours.    Blood Sugar Average: Last 72 hrs:    Patient has insulin pump in place, requesting to remain on pump and not go to sliding scale insulin.    Patient's insulin pump orders confirmed.  Patient's insulin carrol  ESRD on hemodialysis (HCC)  Lab Results   Component Value Date    EGFR 6 01/04/2025    EGFR 8 12/09/2024    EGFR 5 11/10/2024    CREATININE 9.02 (H) 01/04/2025    CREATININE 7.36 (H) 12/09/2024    CREATININE 10.61 (H) 11/10/2024      - Kidney function baseline appears to fluctuate between 7 and 10.   - Hemodialysis Monday Wednesday Fridays   - Nephrology consult placed  Influenza B  Monitor temperature  Patient received Tamiflu in ED  Continue Tamiflu  Tylenol as needed  Symptomatic care    Based on guidelines, CrCL calculated to 14  Patient should get 30mg after each dialysis session for five days  Patient received first dose in ED  Disposition: Stepdown Level 1    History of Present Illness   Mateus Mckeon is a 39 y.o. who presents to the ICU after requiring a Cardene drip for hypertensive urgency in the ED.  Patient has a history of end-stage renal disease getting hemodialysis Monday Wednesdays Fridays, hypertension, type 1 diabetes with an insulin pump.  Patient states that over the past week he has been feeling less himself.  Patient noted he had hypertension today " and he attempted doubling up on his hypertensive medications but his blood pressure remained high.  Patient came into the ED and was found to have blood pressures 240s/110s.  The ED attempted 20 of labetalol as well as 20 of hydralazine with no relief.  Patient was then started on Cardene at a rate of 5.  Also mentioning that he had been feeling rundown and was noted to have congestion and rhinorrhea.  A flu COVID test was done and patient was found to be flu be positive.  Patient was started on Tamiflu in the ED.  Given patient's end-stage renal disease, patient only given 30 mg in the ED.    History obtained from chart review, the patient, and ED provider.  Review of Systems: Review of Systems   Constitutional:  Positive for fever. Negative for chills.   HENT:  Positive for congestion and rhinorrhea.    Eyes:  Negative for visual disturbance.   Respiratory:  Negative for cough, chest tightness and shortness of breath.    Cardiovascular:  Negative for chest pain and palpitations.   Gastrointestinal:  Negative for abdominal pain, diarrhea, nausea and vomiting.   Genitourinary:  Negative for dysuria.   Musculoskeletal:  Negative for arthralgias.   Skin:  Negative for color change.   Neurological:  Positive for weakness and headaches. Negative for dizziness.   Psychiatric/Behavioral:  Negative for agitation and confusion.        Historical Information   Past Medical History:  No date: Acute kidney injury (HCC)  No date: Ambulates with cane  No date: Anuria  No date: Anxiety  03/31/2021: Cellulitis of right elbow  No date: Chronic kidney disease  10/18/2024: COVID-19  No date: Depression  No date: Diabetes mellitus (HCC)  No date: Diarrhea  10/24/2020: Emesis  02/11/2018: End stage renal disease (HCC)      Comment:  Formatting of this note might be different from the                original. Last Assessment & Plan:  Secondary to DM.  On                nightly PD.  Followed by Nephro.  Patient considering                 transplant for kidney and pancreas through [facility]                Formatting of this note might be different from the                original. Last Assessment & Plan:  Formatting of this                note might be different from the original. 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  07/21/2015: Esophagitis  No date: Falls  No date: Gastroparesis  No date: GERD (gastroesophageal reflux disease)  2010: History of shingles  02/2018: History of transfusion      Comment:  no adverse reaction  No date: Hyperlipidemia  No date: Hyperphosphatemia  No date: Hypertension  07/15/2022: Hypoglycemia  No date: Itching  07/15/2022: Mastoiditis of right side  No date: Muscle weakness      Comment:  general unsteadiness  09/09/2019: Obesity (BMI 30.0-34.9)  10/25/2020: Orthostatic hypotension  11/20/2019: Peripheral polyneuropathy  01/26/2018: PONV (postoperative nausea and vomiting)  11/23/2020: Protein-calorie malnutrition (HCC)  07/31/2020: Recurrent peritonitis (HCC) due to peritoneal dialysis   catheter  No date: Retinopathy  No date: Seizures (HCC)      Comment:  early 2020 - one time  No date: Skin abnormality      Comment:  some dime size areas where skin was scratched from                itching  No date: Sleep apnea  10/19/2020: Spontaneous bacterial peritonitis (HCC)  No date: Squamous cell skin cancer      Comment:  left temple  No date: Stroke (HCC)      Comment:  x2 - off balance/no driving/fatigue  No date: Swelling of both lower extremities  04/03/2024: Swelling of joint of upper arm, right  07/21/2022: Traumatic onycholysis  No date: Vomiting  No date: Wears glasses  11/05/2024: Word finding difficulty Past Surgical History:  9/21/2023: CARDIAC ELECTROPHYSIOLOGY PROCEDURE; N/A      Comment:  Procedure: Cardiac loop recorder explant;  Surgeon:                Parish Morgan MD;  Location: [facility];                 Service: Cardiology  05/2018: CARDIAC  LOOP RECORDER  No date: COLONOSCOPY  No date: EGD  No date: EYE SURGERY; Right  11/6/2024: HEMODIALYSIS ADULT  02/23/2021: IR AV FISTULAGRAM/GRAFTOGRAM  1/12/2024: IR CEREBRAL ANGIOGRAPHY  1/5/2024: IR CEREBRAL ANGIOGRAPHY / INTERVENTION  02/16/2021: IR TUNNELED CENTRAL LINE PLACEMENT  11/18/2020: IR TUNNELED DIALYSIS CATHETER PLACEMENT  02/12/2021: IR TUNNELED DIALYSIS CATHETER REMOVAL  03/11/2021: IR TUNNELED DIALYSIS CATHETER REMOVAL  12/14/2022: MOHS SURGERY; Left      Comment:  Left temple with Dr. Hassan  08/27/2018: PERITONEAL CATHETER INSERTION; N/A      Comment:  Procedure: UNROOF PD CATHETER;  Surgeon: Felipe Lindo DO;  Location: [facility];  Service: General  11/09/2020: MA ARTERIOVENOUS ANASTOMOSIS OPEN DIRECT; Left      Comment:  Procedure: CREATION FISTULA  ARTERIOVENOUS (AV) - LEFT                WRIST;  Surgeon: Placido Altamirano MD;  Location: [facility];                 Service: Vascular  04/18/2019: MA ESOPHAGOGASTRODUODENOSCOPY TRANSORAL DIAGNOSTIC; N/A      Comment:  Procedure: ESOPHAGOGASTRODUODENOSCOPY (EGD);  Surgeon:                Ale Figueroa MD;  Location: [facility];  Service:                Gastroenterology  08/06/2018: MA LAPS INSERTION TUNNELED INTRAPERITONEAL CATHETER; N/A      Comment:  Procedure: LAPAROSCOPIC PD CATHETER PLACEMENT;  Surgeon:               Felipe Lindo DO;  Location: [facility];  Service:                General  11/18/2020: MA REMOVAL TUNNELED INTRAPERITONEAL CATHETER; N/A      Comment:  Procedure: REMOVAL CATHETER PERITONEAL DIALYSIS;                 Surgeon: Abdifatah Ty MD;  Location: [facility];                 Service: General  No date: TONSILLECTOMY  No date: UPPER GASTROINTESTINAL ENDOSCOPY   Current Outpatient Medications   Medication Instructions    aspirin (ECOTRIN LOW STRENGTH) 81 mg, Oral, Daily    atorvastatin (LIPITOR) 40 mg, Oral, Every evening    b complex vitamins capsule 1 capsule, Oral, Daily before lunch    calcitriol (ROCALTROL)  0.75 mcg, Oral, 3 times weekly    calcium acetate (PHOSLO) 667 mg, Oral, 3 times daily    cinacalcet (SENSIPAR) 60 mg, Oral, Daily    escitalopram (LEXAPRO) 20 mg, Oral, Daily    famotidine (PEPCID) 40 mg, Oral, Every morning    gabapentin (NEURONTIN) 100 mg capsule TAKE 1 CAPSULE BY MOUTH IN THE MORNING AND 2 AT BEDTIME    GLUCAGON EMERGENCY 1 MG injection     Gvoke HypoPen 1-Pack 1 MG/0.2ML SOAJ As needed    hydrALAZINE (APRESOLINE) 50 mg, Oral, Every 8 hours scheduled, Holding hydralazine if your systolic blood pressure <150    hydrOXYzine HCL (ATARAX) 10 mg, Oral, Every 6 hours PRN    hyoscyamine (ANASPAZ,LEVSIN) 0.125 mg, Oral, Every 4 hours PRN    Insulin Disposable Pump (Omnipod 5 G6 Intro, Gen 5,) KIT No dose, route, or frequency recorded.    Insulin Disposable Pump (Omnipod 5 G6 Pod, Gen 5,) MISC No dose, route, or frequency recorded.    labetalol (NORMODYNE) 400 mg, Oral, 3 times daily    NIFEdipine (PROCARDIA XL) 120 mg, Oral, Daily    NovoLOG 100 UNIT/ML injection     olmesartan (BENICAR) 40 mg, Oral, Daily    ondansetron (ZOFRAN) 4 mg, Oral, Every 8 hours PRN    ondansetron (ZOFRAN) 4 mg, Oral, Every 6 hours    Patiromer Sorbitex Calcium (VELTASSA PO) 5 g, Oral, Weekly    SPS 15 GM/60ML suspension TAKE 60 ML BY MOUTH SINGLE DOSE FOR EMERGENCY USE DURING MISSED HEMODIALYSIS    traZODone (DESYREL) 50 mg tablet TAKE 1/2 (ONE-HALF) TABLET BY MOUTH ONCE DAILY AT BEDTIME AS NEEDED FOR SLEEP    Allergies   Allergen Reactions    Sulfa Antibiotics Rash      Social History     Tobacco Use    Smoking status: Former     Current packs/day: 0.00     Average packs/day: 0.5 packs/day for 12.0 years (6.0 ttl pk-yrs)     Types: Cigarettes     Start date: 2006     Quit date: 2018     Years since quittin.9    Smokeless tobacco: Never    Tobacco comments:     quit 2018   Vaping Use    Vaping status: Every Day    Substances: THC, CBD   Substance Use Topics    Alcohol use: Not Currently    Drug use: Yes     Types:  Marijuana     Comment: medical marijuana last vaped 8/1/2024    Family History   Problem Relation Age of Onset    Breast cancer Mother     Hypertension Mother     Hyperlipidemia Father     Hypertension Father     Leukemia Maternal Grandmother     Hyperlipidemia Maternal Grandfather     Hypertension Maternal Grandfather     Hyperlipidemia Paternal Grandmother     Hypertension Paternal Grandmother     Heart disease Paternal Grandfather         cardiac disorder    Diabetes Paternal Grandfather           Objective :                   Vitals I/O      Most Recent Min/Max in 24hrs   Temp 97.8 °F (36.6 °C) Temp  Min: 97.8 °F (36.6 °C)  Max: 97.8 °F (36.6 °C)   Pulse 79 Pulse  Min: 66  Max: 80   Resp 20 Resp  Min: 20  Max: 20   BP (!) 178/69 BP  Min: 178/69  Max: 251/114   O2 Sat 98 % SpO2  Min: 95 %  Max: 100 %    No intake or output data in the 24 hours ending 01/04/25 2333    Diet Regular; Regular House    Invasive Monitoring           Physical Exam   Physical Exam  Eyes:      Extraocular Movements: Extraocular movements intact.      Pupils: Pupils are equal, round, and reactive to light.   Skin:     General: Skin is warm.   HENT:      Head: Normocephalic and atraumatic.      Right Ear: Hearing normal.      Left Ear: Hearing normal.      Nose: Congestion and rhinorrhea present.      Mouth/Throat:      Mouth: Mucous membranes are moist.   Cardiovascular:      Rate and Rhythm: Normal rate and regular rhythm.      Pulses: Normal pulses.   Musculoskeletal:         General: Normal range of motion.   Abdominal:      Palpations: Abdomen is soft.   Constitutional:       General: He is in acute distress.      Appearance: He is well-developed.   Pulmonary:      Effort: Pulmonary effort is normal.      Breath sounds: Normal breath sounds.   Neurological:      General: No focal deficit present.      Mental Status: He is alert and oriented to person, place and time. Mental status is at baseline. He is not agitated.      Motor:  Strength full and intact in all extremities.          Diagnostic Studies        Lab Results: I have reviewed the following results:     Medications:  Scheduled PRN   [START ON 1/5/2025] aspirin, 81 mg, Daily  atorvastatin, 40 mg, QPM  chlorhexidine, 15 mL, Q12H HALIE  [START ON 1/5/2025] cinacalcet, 60 mg, Daily  [START ON 1/5/2025] enoxaparin, 30 mg, Daily  [START ON 1/5/2025] escitalopram, 20 mg, Daily  [START ON 1/5/2025] famotidine, 40 mg, QAM  [START ON 1/5/2025] gabapentin, 100 mg, Early Morning  gabapentin, 200 mg, HS  hydrALAZINE, 50 mg, Q8H HALIE  labetalol, 400 mg, TID  [START ON 1/5/2025] NIFEdipine, 90 mg, Daily  [START ON 1/5/2025] olmesartan, 40 mg, Daily          Continuous    niCARdipine, 1-15 mg/hr, Last Rate: 10 mg/hr (01/04/25 2478)         Labs:   CBC    Recent Labs     01/04/25 1935   WBC 5.30   HGB 11.8*   HCT 36.4*        BMP    Recent Labs     01/04/25 1935   SODIUM 141   K 4.6   CL 98   CO2 32   AGAP 11   BUN 22   CREATININE 9.02*   CALCIUM 9.1       Coags    Recent Labs     01/04/25 1935   INR 0.90   PTT 31        Additional Electrolytes  Recent Labs     01/04/25 1935   MG 2.5          Blood Gas    No recent results  No recent results LFTs  Recent Labs     01/04/25 1935   ALT 12   AST 19   ALKPHOS 95   ALB 4.4   TBILI 0.59       Infectious  No recent results  Glucose  Recent Labs     01/04/25 1935   GLUC 162*

## 2025-01-05 NOTE — CONSULTS
NEPHROLOGY HOSPITAL CONSULTATION   Mateus Mckeon 39 y.o. male MRN: [id number]  Unit/Bed#: ICU 11 Encounter: [id number]    Brief History of Admission - Mateus Mckeon is a 39 y.o. male who was admitted to [facility] after presenting with hypertensive urgency. A renal consultation is requested today for assistance in the management of ESRD on HD.    Assessment & Plan  ESRD on hemodialysis (HCC)  MWF at [facility]  Access: Left forearm AV fistula  EDW: 89.4 kg  Electrolyte (K4.4) and volume status stable today  Last outpatient HD: Friday 2 hours, full treatment on Thursday due to holiday schedule  Next HD: Monday, 01/06  Hypertensive urgency  Per [facility] records Home Rx: Hydralazine 50 mg 3 times daily, labetalol 400 mg 3 times daily, nifedipine 90 mg twice daily, olmesartan 40 mg daily  Current Rx: Hydralazine 50 mg 3 times daily, labetalol 400 mg 3 times daily, nifedipine 90 mg daily, olmesartan 40 mg daily  Status post nicardipine drip with improvement in blood pressure  Changes: Start weaning off nicardipine drip and continue oral antihypertensive regimen  Type 1 diabetes mellitus (HCC)  Management per primary team  Influenza B  Management per ICU  Anemia in chronic kidney disease, on chronic dialysis ([facility])  Outpatient Rx: Mircera 150 mcg every 2 weeks, IV Venofer 50 mg weekly  Hemoglobin on admission 11.8 at goal  Continue Mircera and Venofer per protocol as outpatient  Secondary hyperparathyroidism of renal origin ([facility])  Home Rx: Cinacalcet 60 mg daily, PhosLo 1 tablet 3 times daily with meals  Continue home Rx    Discussed with ICU team.  After discussion, we agreed that due to improvement in blood pressure, we can hold off on dialysis today and to perform dialysis per schedule tomorrow and if blood pressure control becomes an issue later today we can certainly dialyze him in that situation.    HISTORY OF PRESENT ILLNESS:  Requesting Physician: Esteban Echavarria MD  Reason for  "Consult: ESRD on HD    Mateus Mckeon is a 39 y.o. male who was admitted to [facility] after presenting with hypertensive urgency. A renal consultation is requested today for assistance in the management of ESRD on HD.  Patient has history of ESRD on HD MWF, hypertension, type 1 diabetes who presented with not feeling himself.  Patient noted he had hypertension yesterday and attempted doubling up on his hypertensive medications but his blood pressure remained high.  Patient presented to the ED and was found to have blood pressure of 240/110.  ED provided 20 mg of labetalol as well as 20 mg of hydralazine with no relief.  He was later started on nicardipine drip and admitted to ICU.  He was also found to be influenza positive and was provided Tamiflu in ED.      DIALYSIS PRESCRIPTION    DIALYSIS PRESCRIPTION - Conventional Hemodialysis  Conventional Hemodialysis  Data Element Value   Order Date/Time December 16, 2024   Frequency 3X Week   Treatment Days MonWedFri   Dialyzer 180NRe Optiflux   Treatment Time (Total Minutes) 240 min   Blood Flow Rate (mL/min) 450 mL/min   Dialysate Flow Rate Manual 700   Estimated Dry Weight 89.4 kg   Dialysate Concentrate 2.0 K, 2.5 Ca, 1.0 Mg, 100 Dextrose ()   Sodium (mEq/L) 137 mEq/L   Bicarb Machine Setting (mEq/L) 35 mEq/L   Dialysis Access Hemodialysis-AV Fistula-Standard, Left Forearm, Other/Unknown  Access Placed on November 05, 2020   Arterial Needle Size 15g1\"   Venous Needle Size 15g1\"       PAST MEDICAL HISTORY:  Past Medical History:   Diagnosis Date    Acute kidney injury (HCC)     Ambulates with cane     Anuria     Anxiety     Cellulitis of right elbow 03/31/2021    Chronic kidney disease     COVID-19 10/18/2024    Depression     Diabetes mellitus (HCC)     Diarrhea     Emesis 10/24/2020    End stage renal disease (HCC) 02/11/2018    Formatting of this note might be different from the original. Last Assessment & Plan:  Secondary to DM.  On " nightly PD.  Followed by Nephro.  Patient considering transplant for kidney and pancreas through [facility] Formatting of this note might be different from the original. Last Assessment & Plan:  Formatting of this note might be different from the original. 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    Esophagitis 07/21/2015    Falls     Gastroparesis     GERD (gastroesophageal reflux disease)     History of shingles 2010    History of transfusion 02/2018    no adverse reaction    Hyperlipidemia     Hyperphosphatemia     Hypertension     Hypoglycemia 07/15/2022    Itching     Mastoiditis of right side 07/15/2022    Muscle weakness     general unsteadiness    Obesity (BMI 30.0-34.9) 09/09/2019    Orthostatic hypotension 10/25/2020    Peripheral polyneuropathy 11/20/2019    PONV (postoperative nausea and vomiting) 01/26/2018    Protein-calorie malnutrition (HCC) 11/23/2020    Recurrent peritonitis (HCC) due to peritoneal dialysis catheter 07/31/2020    Retinopathy     Seizures (HCC)     early 2020 - one time    Skin abnormality     some dime size areas where skin was scratched from itching    Sleep apnea     Spontaneous bacterial peritonitis (HCC) 10/19/2020    Squamous cell skin cancer     left temple    Stroke (HCC)     x2 - off balance/no driving/fatigue    Swelling of both lower extremities     Swelling of joint of upper arm, right 04/03/2024    Traumatic onycholysis 07/21/2022    Vomiting     Wears glasses     Word finding difficulty 11/05/2024       PAST SURGICAL HISTORY:  Past Surgical History:   Procedure Laterality Date    CARDIAC ELECTROPHYSIOLOGY PROCEDURE N/A 9/21/2023    Procedure: Cardiac loop recorder explant;  Surgeon: Parish Morgan MD;  Location: [facility];  Service: Cardiology    CARDIAC LOOP RECORDER  05/2018    COLONOSCOPY      EGD      EYE SURGERY Right     HEMODIALYSIS ADULT  11/6/2024    IR AV FISTULAGRAM/GRAFTOGRAM  02/23/2021    IR CEREBRAL  ANGIOGRAPHY  2024    IR CEREBRAL ANGIOGRAPHY / INTERVENTION  2024    IR TUNNELED CENTRAL LINE PLACEMENT  2021    IR TUNNELED DIALYSIS CATHETER PLACEMENT  2020    IR TUNNELED DIALYSIS CATHETER REMOVAL  2021    IR TUNNELED DIALYSIS CATHETER REMOVAL  2021    MOHS SURGERY Left 2022    Left temple with Dr. Hassan    PERITONEAL CATHETER INSERTION N/A 2018    Procedure: UNROOF PD CATHETER;  Surgeon: Felipe Lindo DO;  Location: [facility];  Service: General    NM ARTERIOVENOUS ANASTOMOSIS OPEN DIRECT Left 2020    Procedure: CREATION FISTULA  ARTERIOVENOUS (AV) - LEFT WRIST;  Surgeon: Placido Altamirano MD;  Location: [facility];  Service: Vascular    NM ESOPHAGOGASTRODUODENOSCOPY TRANSORAL DIAGNOSTIC N/A 2019    Procedure: ESOPHAGOGASTRODUODENOSCOPY (EGD);  Surgeon: Ale Figueroa MD;  Location: [facility];  Service: Gastroenterology    NM LAPS INSERTION TUNNELED INTRAPERITONEAL CATHETER N/A 2018    Procedure: LAPAROSCOPIC PD CATHETER PLACEMENT;  Surgeon: Felipe Lindo DO;  Location: [facility];  Service: General    NM REMOVAL TUNNELED INTRAPERITONEAL CATHETER N/A 2020    Procedure: REMOVAL CATHETER PERITONEAL DIALYSIS;  Surgeon: Abdifatah Ty MD;  Location: [facility];  Service: General    TONSILLECTOMY      UPPER GASTROINTESTINAL ENDOSCOPY         ALLERGIES:  Allergies   Allergen Reactions    Sulfa Antibiotics Rash       SOCIAL HISTORY:  Social History     Substance and Sexual Activity   Alcohol Use Not Currently     Social History     Substance and Sexual Activity   Drug Use Yes    Types: Marijuana    Comment: medical marijuana last vaped 2024     Social History     Tobacco Use   Smoking Status Former    Current packs/day: 0.00    Average packs/day: 0.5 packs/day for 12.0 years (6.0 ttl pk-yrs)    Types: Cigarettes    Start date: 2006    Quit date: 2018    Years since quittin.9   Smokeless Tobacco Never   Tobacco Comments    quit 2018        FAMILY HISTORY:  Family History   Problem Relation Age of Onset    Breast cancer Mother     Hypertension Mother     Hyperlipidemia Father     Hypertension Father     Leukemia Maternal Grandmother     Hyperlipidemia Maternal Grandfather     Hypertension Maternal Grandfather     Hyperlipidemia Paternal Grandmother     Hypertension Paternal Grandmother     Heart disease Paternal Grandfather         cardiac disorder    Diabetes Paternal Grandfather        MEDICATIONS:    Current Facility-Administered Medications:     aspirin (ECOTRIN LOW STRENGTH) EC tablet 81 mg, 81 mg, Oral, Daily, Holden Singh MD    atorvastatin (LIPITOR) tablet 40 mg, 40 mg, Oral, QPM, Holden Singh MD    chlorhexidine (PERIDEX) 0.12 % oral rinse 15 mL, 15 mL, Mouth/Throat, Q12H [facility], Holden Singh MD    cinacalcet (SENSIPAR) tablet 60 mg, 60 mg, Oral, Daily, Holden Singh MD    enoxaparin (LOVENOX) subcutaneous injection 30 mg, 30 mg, Subcutaneous, Daily, Holden Singh MD    escitalopram (LEXAPRO) tablet 20 mg, 20 mg, Oral, Daily, Holden Singh MD    famotidine (PEPCID) tablet 40 mg, 40 mg, Oral, QAM, Holden Singh MD    gabapentin (NEURONTIN) capsule 100 mg, 100 mg, Oral, Early Morning, Holden Singh MD    gabapentin (NEURONTIN) capsule 200 mg, 200 mg, Oral, HS, Holden Singh MD, 200 mg at 01/04/25 2256    hydrALAZINE (APRESOLINE) tablet 50 mg, 50 mg, Oral, Q8H HALIE, Holden Singh MD, 50 mg at 01/04/25 2258    insulin aspart (NovoLOG) FOR PUMP REFILLS 100 Units, 100 Units, Subcutaneous Insulin Pump, Daily PRN, Holden Singh MD    labetalol (NORMODYNE) tablet 400 mg, 400 mg, Oral, TID, Holden Singh MD, 400 mg at 01/04/25 2257    niCARdipine (CARDENE) 25 mg (STANDARD CONCENTRATION) in sodium chloride 0.9% 250 mL, 1-15 mg/hr, Intravenous, Titrated, Holden Singh MD, Last Rate: 150 mL/hr at 01/05/25 0215, 15 mg/hr at 01/05/25 0215    NIFEdipine (PROCARDIA XL) 24 hr tablet 90 mg, 90  mg, Oral, Daily, Holden Singh MD    olmesartan (BENICAR) tablet 40 mg, 40 mg, Oral, Daily, Holden Singh MD    ondansetron (ZOFRAN) injection 4 mg, 4 mg, Intravenous, Q6H PRN, Holden Singh MD, 4 mg at 01/04/25 6054    PATIENT MAINTAINED INSULIN PUMP 1 each, 1 each, Subcutaneous, Q8H, Holden Singh MD    REVIEW OF SYSTEMS:  Constitutional: Negative for fatigue, anorexia, fever, chills, diaphoresis  HENT: Negative for postnasal drip  Eyes: Negative for visual disturbance.   Respiratory: Negative for cough, shortness of breath and wheezing.   Cardiovascular: Negative for chest pain, palpitations and leg swelling.   Gastrointestinal: Negative for abdominal pain, constipation, diarrhea, nausea and vomiting.   Genitourinary: No dysuria, hematuria  Endocrine: Negative for polyuria.   Musculoskeletal: Negative for arthralgias, back pain and joint swelling.   Skin: Negative for rash.   Neurological: Negative for focal weakness, headaches, dizziness.  Hematological: Negative for easy bruising or bleeding.  Psychiatric/Behavioral: Negative for confusion and sleep disturbance.   All the systems were reviewed and were negative except as documented on the HPI.    PHYSICAL EXAM:  Current Weight: Weight - Scale: 91 kg (200 lb 9.9 oz)  First Weight: Weight - Scale: 91 kg (200 lb 9.9 oz)  Vitals:    01/05/25 0415 01/05/25 0430 01/05/25 0445 01/05/25 0500   BP: (!) 171/87 162/78 145/65 132/65   Pulse: 90 87 81 76   Resp: 22 12 12 19   Temp:       TempSrc:       SpO2: 94% 100% 96% 96%   Weight:       Height:           Intake/Output Summary (Last 24 hours) at 1/5/2025 0513  Last data filed at 1/5/2025 0234  Gross per 24 hour   Intake 120 ml   Output 0 ml   Net 120 ml     Physical Exam  Constitutional:       Appearance: Normal appearance.   HENT:      Head: Normocephalic and atraumatic.   Cardiovascular:      Rate and Rhythm: Normal rate and regular rhythm.      Comments: Left forearm AV fistula with positive thrill  "and bruit  Pulmonary:      Effort: Pulmonary effort is normal.      Breath sounds: Normal breath sounds.   Abdominal:      Palpations: Abdomen is soft.   Musculoskeletal:         General: Normal range of motion.   Skin:     General: Skin is warm.   Neurological:      Mental Status: He is alert and oriented to person, place, and time. Mental status is at baseline.   Psychiatric:         Mood and Affect: Mood normal.       Lab Results:   Results from last 7 days   Lab Units 01/04/25  2324 01/04/25  1935   WBC Thousand/uL  --  5.30   HEMOGLOBIN g/dL  --  11.8*   HEMATOCRIT %  --  36.4*   PLATELETS Thousands/uL 189 227   POTASSIUM mmol/L  --  4.6   CHLORIDE mmol/L  --  98   CO2 mmol/L  --  32   BUN mg/dL  --  22   CREATININE mg/dL  --  9.02*   CALCIUM mg/dL  --  9.1   MAGNESIUM mg/dL  --  2.5   ALK PHOS U/L  --  95   ALT U/L  --  12   AST U/L  --  19     Portions of the record may have been created with voice recognition software. Occasional wrong word or \"sound a like\" substitutions may have occurred due to the inherent limitations of voice recognition software. Read the chart carefully and recognize, using context, where substitutions have occurred.If you have any questions, please contact the dictating provider.    "

## 2025-01-06 ENCOUNTER — APPOINTMENT (INPATIENT)
Dept: DIALYSIS | Facility: HOSPITAL | Age: 40
DRG: 193 | End: 2025-01-06
Payer: MEDICARE

## 2025-01-06 PROBLEM — I12.0 BENIGN HYPERTENSION WITH ESRD (END-STAGE RENAL DISEASE) (HCC): Status: ACTIVE | Noted: 2025-01-06

## 2025-01-06 PROBLEM — N18.6 BENIGN HYPERTENSION WITH ESRD (END-STAGE RENAL DISEASE) (HCC): Status: ACTIVE | Noted: 2025-01-06

## 2025-01-06 LAB
ANION GAP SERPL CALCULATED.3IONS-SCNC: 11 MMOL/L (ref 4–13)
BUN SERPL-MCNC: 32 MG/DL (ref 5–25)
CALCIUM SERPL-MCNC: 8.5 MG/DL (ref 8.4–10.2)
CHLORIDE SERPL-SCNC: 100 MMOL/L (ref 96–108)
CO2 SERPL-SCNC: 29 MMOL/L (ref 21–32)
CREAT SERPL-MCNC: 12.1 MG/DL (ref 0.6–1.3)
ERYTHROCYTE [DISTWIDTH] IN BLOOD BY AUTOMATED COUNT: 14.9 % (ref 11.6–15.1)
GFR SERPL CREATININE-BSD FRML MDRD: 4 ML/MIN/1.73SQ M
GLUCOSE SERPL-MCNC: 139 MG/DL (ref 65–140)
GLUCOSE SERPL-MCNC: 144 MG/DL (ref 65–140)
GLUCOSE SERPL-MCNC: 153 MG/DL (ref 65–140)
GLUCOSE SERPL-MCNC: 213 MG/DL (ref 65–140)
GLUCOSE SERPL-MCNC: 214 MG/DL (ref 65–140)
HCT VFR BLD AUTO: 33.9 % (ref 36.5–49.3)
HGB BLD-MCNC: 11.1 G/DL (ref 12–17)
MAGNESIUM SERPL-MCNC: 2.4 MG/DL (ref 1.9–2.7)
MCH RBC QN AUTO: 32.6 PG (ref 26.8–34.3)
MCHC RBC AUTO-ENTMCNC: 32.7 G/DL (ref 31.4–37.4)
MCV RBC AUTO: 99 FL (ref 82–98)
PLATELET # BLD AUTO: 243 THOUSANDS/UL (ref 149–390)
PMV BLD AUTO: 10.1 FL (ref 8.9–12.7)
POTASSIUM SERPL-SCNC: 4.4 MMOL/L (ref 3.5–5.3)
RBC # BLD AUTO: 3.41 MILLION/UL (ref 3.88–5.62)
SODIUM SERPL-SCNC: 140 MMOL/L (ref 135–147)
WBC # BLD AUTO: 5.07 THOUSAND/UL (ref 4.31–10.16)

## 2025-01-06 PROCEDURE — 82948 REAGENT STRIP/BLOOD GLUCOSE: CPT

## 2025-01-06 PROCEDURE — 87081 CULTURE SCREEN ONLY: CPT | Performed by: INTERNAL MEDICINE

## 2025-01-06 PROCEDURE — 80048 BASIC METABOLIC PNL TOTAL CA: CPT | Performed by: PHYSICIAN ASSISTANT

## 2025-01-06 PROCEDURE — 83735 ASSAY OF MAGNESIUM: CPT | Performed by: PHYSICIAN ASSISTANT

## 2025-01-06 PROCEDURE — 85027 COMPLETE CBC AUTOMATED: CPT | Performed by: PHYSICIAN ASSISTANT

## 2025-01-06 PROCEDURE — 90935 HEMODIALYSIS ONE EVALUATION: CPT | Performed by: STUDENT IN AN ORGANIZED HEALTH CARE EDUCATION/TRAINING PROGRAM

## 2025-01-06 PROCEDURE — 99232 SBSQ HOSP IP/OBS MODERATE 35: CPT | Performed by: INTERNAL MEDICINE

## 2025-01-06 RX ORDER — LOSARTAN POTASSIUM 50 MG/1
100 TABLET ORAL
Status: DISCONTINUED | OUTPATIENT
Start: 2025-01-06 | End: 2025-01-06

## 2025-01-06 RX ORDER — LABETALOL HYDROCHLORIDE 5 MG/ML
20 INJECTION, SOLUTION INTRAVENOUS ONCE
Status: COMPLETED | OUTPATIENT
Start: 2025-01-06 | End: 2025-01-06

## 2025-01-06 RX ORDER — NIFEDIPINE 30 MG/1
60 TABLET, EXTENDED RELEASE ORAL ONCE
Status: COMPLETED | OUTPATIENT
Start: 2025-01-06 | End: 2025-01-06

## 2025-01-06 RX ORDER — HYDRALAZINE HYDROCHLORIDE 20 MG/ML
20 INJECTION INTRAMUSCULAR; INTRAVENOUS ONCE
Status: COMPLETED | OUTPATIENT
Start: 2025-01-06 | End: 2025-01-06

## 2025-01-06 RX ORDER — HYDRALAZINE HYDROCHLORIDE 25 MG/1
75 TABLET, FILM COATED ORAL EVERY 8 HOURS SCHEDULED
Status: DISCONTINUED | OUTPATIENT
Start: 2025-01-06 | End: 2025-01-06

## 2025-01-06 RX ORDER — HYDRALAZINE HYDROCHLORIDE 25 MG/1
100 TABLET, FILM COATED ORAL EVERY 8 HOURS SCHEDULED
Status: DISCONTINUED | OUTPATIENT
Start: 2025-01-06 | End: 2025-01-07 | Stop reason: HOSPADM

## 2025-01-06 RX ORDER — NIFEDIPINE 30 MG/1
30 TABLET, EXTENDED RELEASE ORAL ONCE
Status: COMPLETED | OUTPATIENT
Start: 2025-01-06 | End: 2025-01-06

## 2025-01-06 RX ORDER — CLONIDINE HYDROCHLORIDE 0.1 MG/1
0.2 TABLET ORAL EVERY 12 HOURS SCHEDULED
Status: DISCONTINUED | OUTPATIENT
Start: 2025-01-06 | End: 2025-01-07 | Stop reason: HOSPADM

## 2025-01-06 RX ORDER — LOSARTAN POTASSIUM 50 MG/1
100 TABLET ORAL
Status: DISCONTINUED | OUTPATIENT
Start: 2025-01-07 | End: 2025-01-07 | Stop reason: HOSPADM

## 2025-01-06 RX ORDER — OSELTAMIVIR PHOSPHATE 30 MG/1
30 CAPSULE ORAL
Status: DISCONTINUED | OUTPATIENT
Start: 2025-01-06 | End: 2025-01-07 | Stop reason: HOSPADM

## 2025-01-06 RX ORDER — NIFEDIPINE 30 MG/1
120 TABLET, EXTENDED RELEASE ORAL
Status: DISCONTINUED | OUTPATIENT
Start: 2025-01-06 | End: 2025-01-06

## 2025-01-06 RX ORDER — NIFEDIPINE 30 MG/1
120 TABLET, EXTENDED RELEASE ORAL
Status: DISCONTINUED | OUTPATIENT
Start: 2025-01-07 | End: 2025-01-07 | Stop reason: HOSPADM

## 2025-01-06 RX ORDER — NIFEDIPINE 30 MG/1
120 TABLET, EXTENDED RELEASE ORAL DAILY
Status: DISCONTINUED | OUTPATIENT
Start: 2025-01-07 | End: 2025-01-06

## 2025-01-06 RX ORDER — FAMOTIDINE 20 MG/1
10 TABLET, FILM COATED ORAL
Status: DISCONTINUED | OUTPATIENT
Start: 2025-01-06 | End: 2025-01-07 | Stop reason: HOSPADM

## 2025-01-06 RX ORDER — LABETALOL HYDROCHLORIDE 5 MG/ML
20 INJECTION, SOLUTION INTRAVENOUS EVERY 6 HOURS PRN
Status: DISCONTINUED | OUTPATIENT
Start: 2025-01-06 | End: 2025-01-07 | Stop reason: HOSPADM

## 2025-01-06 RX ADMIN — LABETALOL HYDROCHLORIDE 400 MG: 200 TABLET, FILM COATED ORAL at 21:19

## 2025-01-06 RX ADMIN — NIFEDIPINE 60 MG: 30 TABLET, FILM COATED, EXTENDED RELEASE ORAL at 22:30

## 2025-01-06 RX ADMIN — LABETALOL HYDROCHLORIDE 20 MG: 5 INJECTION, SOLUTION INTRAVENOUS at 05:28

## 2025-01-06 RX ADMIN — CHLORHEXIDINE GLUCONATE 15 ML: 1.2 RINSE ORAL at 21:19

## 2025-01-06 RX ADMIN — CLONIDINE HYDROCHLORIDE 0.2 MG: 0.1 TABLET ORAL at 12:50

## 2025-01-06 RX ADMIN — ASPIRIN 81 MG: 81 TABLET, COATED ORAL at 08:06

## 2025-01-06 RX ADMIN — LOSARTAN POTASSIUM 100 MG: 50 TABLET, FILM COATED ORAL at 08:06

## 2025-01-06 RX ADMIN — CALCIUM ACETATE 667 MG: 667 CAPSULE ORAL at 08:06

## 2025-01-06 RX ADMIN — ESCITALOPRAM OXALATE 20 MG: 20 TABLET ORAL at 08:06

## 2025-01-06 RX ADMIN — HYDRALAZINE HYDROCHLORIDE 20 MG: 20 INJECTION INTRAMUSCULAR; INTRAVENOUS at 01:55

## 2025-01-06 RX ADMIN — NIFEDIPINE 90 MG: 30 TABLET, FILM COATED, EXTENDED RELEASE ORAL at 08:06

## 2025-01-06 RX ADMIN — GABAPENTIN 100 MG: 100 CAPSULE ORAL at 05:19

## 2025-01-06 RX ADMIN — HEPARIN SODIUM 5000 UNITS: 5000 INJECTION INTRAVENOUS; SUBCUTANEOUS at 08:06

## 2025-01-06 RX ADMIN — CHLORHEXIDINE GLUCONATE 15 ML: 1.2 RINSE ORAL at 08:06

## 2025-01-06 RX ADMIN — ONDANSETRON 4 MG: 2 INJECTION INTRAMUSCULAR; INTRAVENOUS at 11:02

## 2025-01-06 RX ADMIN — FAMOTIDINE 40 MG: 20 TABLET, FILM COATED ORAL at 08:06

## 2025-01-06 RX ADMIN — LABETALOL HYDROCHLORIDE 20 MG: 5 INJECTION, SOLUTION INTRAVENOUS at 04:04

## 2025-01-06 RX ADMIN — GABAPENTIN 200 MG: 100 CAPSULE ORAL at 21:19

## 2025-01-06 RX ADMIN — ATORVASTATIN CALCIUM 40 MG: 40 TABLET, FILM COATED ORAL at 17:23

## 2025-01-06 RX ADMIN — CINACALCET 60 MG: 30 TABLET ORAL at 08:12

## 2025-01-06 RX ADMIN — LABETALOL HYDROCHLORIDE 20 MG: 5 INJECTION, SOLUTION INTRAVENOUS at 10:27

## 2025-01-06 RX ADMIN — HEPARIN SODIUM 5000 UNITS: 5000 INJECTION INTRAVENOUS; SUBCUTANEOUS at 17:23

## 2025-01-06 RX ADMIN — LABETALOL HYDROCHLORIDE 400 MG: 200 TABLET, FILM COATED ORAL at 15:08

## 2025-01-06 RX ADMIN — HYDRALAZINE HYDROCHLORIDE 20 MG: 20 INJECTION INTRAMUSCULAR; INTRAVENOUS at 06:40

## 2025-01-06 RX ADMIN — LABETALOL HYDROCHLORIDE 400 MG: 200 TABLET, FILM COATED ORAL at 08:06

## 2025-01-06 RX ADMIN — CLONIDINE HYDROCHLORIDE 0.2 MG: 0.1 TABLET ORAL at 21:21

## 2025-01-06 RX ADMIN — NIFEDIPINE 30 MG: 30 TABLET, FILM COATED, EXTENDED RELEASE ORAL at 10:26

## 2025-01-06 RX ADMIN — HYDRALAZINE HYDROCHLORIDE 100 MG: 25 TABLET ORAL at 21:19

## 2025-01-06 RX ADMIN — HYDRALAZINE HYDROCHLORIDE 100 MG: 25 TABLET ORAL at 13:29

## 2025-01-06 RX ADMIN — OSELTAMAVIR PHOSPHATE 30 MG: 30 CAPSULE ORAL at 15:10

## 2025-01-06 RX ADMIN — FAMOTIDINE 10 MG: 20 TABLET, FILM COATED ORAL at 15:08

## 2025-01-06 RX ADMIN — HYDRALAZINE HYDROCHLORIDE 50 MG: 25 TABLET ORAL at 05:18

## 2025-01-06 RX ADMIN — CALCIUM ACETATE 667 MG: 667 CAPSULE ORAL at 17:23

## 2025-01-06 NOTE — ASSESSMENT & PLAN NOTE
Patient has a dexacom on monitoring BS, administering insulin   1/5 Mother called and reported that dexacom was reading in the 60's. RN performed POCT,     Plan  BS checks ordered   Patient continued dexacom and insulin administration   Monitor for signs and symptoms of hypoglycemia     Lab Results   Component Value Date    HGBA1C 6.9 (H) 11/05/2024       Recent Labs     01/05/25  0730 01/05/25  1143 01/05/25  1733 01/05/25  2134   POCGLU 154* 165* 190* 214*       Blood Sugar Average: Last 72 hrs:  (P) 180.75  Patient has insulin pump in place, requesting to remain on pump and not go to sliding scale insulin.    Patient's insulin pump orders confirmed.  Patient's insulin carrol  One episode of hypoglycemia noted on patient's pump per mother  Day team checked with a bedside fingerstick an found patient blood sugars to be in 150's  Mother states pump will sometimes read incorrectly low    Patient getting continuous fingerstick checks

## 2025-01-06 NOTE — ASSESSMENT & PLAN NOTE
#ESRD on HD MWF:  Dialysis unit/days: [facility]  Access: AV fistula  Currently on dialysis, well-tolerated  Plan to continue to use per schedule  Renal Diet  Fluid restriction 1.8 L /d  Adjust medications to GFR<10  Avoid opioids

## 2025-01-06 NOTE — PLAN OF CARE
Target UF Goal  3.5  L as tolerated. Patient dialyzing for 3 hours on 3 K bath for serum K of  4.4  per protocol. Treatment plan reviewed with Nephrology.   Problem: METABOLIC, FLUID AND ELECTROLYTES - ADULT  Goal: Electrolytes maintained within normal limits  Description: INTERVENTIONS:  - Monitor labs and assess patient for signs and symptoms of electrolyte imbalances  - Administer electrolyte replacement as ordered  - Monitor response to electrolyte replacements, including repeat lab results as appropriate  - Instruct patient on fluid and nutrition as appropriate  Outcome: Progressing  Goal: Fluid balance maintained  Description: INTERVENTIONS:  - Monitor labs   - Monitor I/O and WT  - Instruct patient on fluid and nutrition as appropriate  - Assess for signs & symptoms of volume excess or deficit  Outcome: Progressing   Post-Dialysis RN Treatment Note    Blood Pressure:  Pre 208/95 mm/Hg  Post 190/89 mmHg   EDW:  89.4 kg    Weight:  Pre 96.9 kg   Post 88.3 kg   Mode of weight measurement: Bed Scale   Volume Removed:  3500 ml    Treatment duration: 180 minutes    NS given:  No    Treatment shortened Yes, describe: due to high census. Dr. Reyes aware and okayed   Medications given during Rx: None Reported   Estimated Kt/V:  None Reported   Access type: AV fistula   Needle Gauge:  15g   Access Issues: No    Report called to primary nurse:   Yes Elizabeth Knott RN

## 2025-01-06 NOTE — ASSESSMENT & PLAN NOTE
Current hemoglobin: 11.1 mg/dL  Treatment:  EPO  Transfuse for hemoglobin less than 7.0 per primary service

## 2025-01-06 NOTE — ASSESSMENT & PLAN NOTE
Patient has history of hypertension with end-stage renal disease.  Given 20 of labetalol and 10 of hydralazine in the ED with no relief.  Started on Cardene drip.    Plan  Cardene drip off  Continue home meds, patient tolerating p.o.

## 2025-01-06 NOTE — ASSESSMENT & PLAN NOTE
Lab Results   Component Value Date    EGFR 4 01/06/2025    EGFR 5 01/05/2025    EGFR 6 01/04/2025    CREATININE 12.10 (H) 01/06/2025    CREATININE 9.71 (H) 01/05/2025    CREATININE 9.02 (H) 01/04/2025     Volume: Hypervolemic  Blood pressure: Hypertensive, /93 at initiation of dialysis  Low-sodium diet  Continue clonidine 0.2 mg twice daily  Continue hydralazine 100 mg 3 times daily  Labetalol 400 mg 3 times daily  Losartan 100 mg at bedtime  Nifedipine 120 mg every 24 hours, patient take it at bedtime  UF on HD

## 2025-01-06 NOTE — PROGRESS NOTES
Progress Note - Critical Care/ICU   Name: Mateus Mckeon 39 y.o. male I MRN: [id number]  Unit/Bed#: ICU 11 I Date of Admission: 1/4/2025   Date of Service: 1/6/2025 I Hospital Day: 2      Assessment & Plan  Hypertensive urgency  Patient has history of hypertension with end-stage renal disease.  Given 20 of labetalol and 10 of hydralazine in the ED with no relief.  Started on Cardene drip.    Plan  Cardene drip off  Continue home meds, patient tolerating p.o.  Type 1 diabetes mellitus (HCC)  Patient has a dexacom on monitoring BS, administering insulin   1/5 Mother called and reported that dexacom was reading in the 60's. RN performed POCT,     Plan  BS checks ordered   Patient continued dexacom and insulin administration   Monitor for signs and symptoms of hypoglycemia     Lab Results   Component Value Date    HGBA1C 6.9 (H) 11/05/2024       Recent Labs     01/05/25  0730 01/05/25  1143 01/05/25  1733 01/05/25  2134   POCGLU 154* 165* 190* 214*       Blood Sugar Average: Last 72 hrs:  (P) 180.75  Patient has insulin pump in place, requesting to remain on pump and not go to sliding scale insulin.    Patient's insulin pump orders confirmed.  Patient's insulin carrol  One episode of hypoglycemia noted on patient's pump per mother  Day team checked with a bedside fingerstick an found patient blood sugars to be in 150's  Mother states pump will sometimes read incorrectly low    Patient getting continuous fingerstick checks  ESRD on hemodialysis (HCC)  Patient is MWF dialysis   Last dialysis was this past Friday     Plan  Plan for dialysis tomorrow   Nephrology consulted, appreciated recommendations     Lab Results   Component Value Date    EGFR 5 01/05/2025    EGFR 6 01/04/2025    EGFR 8 12/09/2024    CREATININE 9.71 (H) 01/05/2025    CREATININE 9.02 (H) 01/04/2025    CREATININE 7.36 (H) 12/09/2024      - Kidney function baseline appears to fluctuate between 7 and 10.   - Hemodialysis Monday Wednesday Fridays   -  Nephrology consult placed   - HD scheduled  Influenza B  Monitor temperature  Patient received Tamiflu in ED  Continue Tamiflu  Tylenol as needed  Symptomatic care    Based on guidelines, CrCL calculated to 14  Patient should get 30mg after each dialysis session for five days  Patient received first dose in ED  Anemia in chronic kidney disease, on chronic dialysis (HCC)  Lab Results   Component Value Date    EGFR 5 01/05/2025    EGFR 6 01/04/2025    EGFR 8 12/09/2024    CREATININE 9.71 (H) 01/05/2025    CREATININE 9.02 (H) 01/04/2025    CREATININE 7.36 (H) 12/09/2024      Last Assessment & Plan:    Formatting of this note might be different from the original.   -received Epo 1/3   -goal Hb>7 before transfusing   -looks clinically well   -follow up with Nephro   Disposition: Stepdown Level 1    ICU Core Measures     A: Assess, Prevent, and Manage Pain Has pain been assessed? Yes  Need for changes to pain regimen? No   B: Both SAT/SAT  N/A   C: Choice of Sedation RASS Goal: 0 Alert and Calm  Need for changes to sedation or analgesia regimen? No   D: Delirium CAM-ICU: Negative   E: Early Mobility  Plan for early mobility? Yes   F: Family Engagement Plan for family engagement today? Yes         Prophylaxis:  VTE VTE covered by:  heparin (porcine), Subcutaneous       Stress Ulcer  covered byfamotidine (PEPCID) 40 MG tablet [[id number]] (Long-Term Med), famotidine (PEPCID) tablet 40 mg [[id number]]         24 Hour Events : Patient off Cardene drip.  Patient stable for downgrade, no reoccurence of hypertension urgency.  Subjective   Review of Systems: See HPI for Review of Systems    Objective :                   Vitals I/O      Most Recent Min/Max in 24hrs   Temp 98.5 °F (36.9 °C) Temp  Min: 97.8 °F (36.6 °C)  Max: 98.5 °F (36.9 °C)   Pulse 66 Pulse  Min: 65  Max: 90   Resp 19 Resp  Min: 4  Max: 39   /77 BP  Min: 116/58  Max: 171/87   O2 Sat 97 % SpO2  Min: 94 %  Max: 100 %      Intake/Output Summary (Last 24 hours)  at 1/6/2025 0029  Last data filed at 1/5/2025 1901  Gross per 24 hour   Intake 2725.83 ml   Output 0 ml   Net 2725.83 ml       Diet Renal; Lo Phosphorus; Yes; Fluid Restriction 1500 ML; No    Invasive Monitoring           Physical Exam   Physical Exam  Eyes:      Extraocular Movements: Extraocular movements intact.      Pupils: Pupils are equal, round, and reactive to light.   Skin:     General: Skin is warm.      Capillary Refill: Capillary refill takes less than 2 seconds.   HENT:      Head: Normocephalic and atraumatic.      Right Ear: Hearing normal.      Left Ear: Hearing normal.      Mouth/Throat:      Mouth: Mucous membranes are moist.   Cardiovascular:      Rate and Rhythm: Normal rate and regular rhythm.      Pulses: Normal pulses.   Musculoskeletal:         General: Normal range of motion.   Abdominal:      Palpations: Abdomen is soft.   Constitutional:       Appearance: He is well-developed and well-nourished.   Pulmonary:      Effort: Pulmonary effort is normal.      Breath sounds: Normal breath sounds.   Neurological:      General: No focal deficit present.      Mental Status: He is alert and oriented to person, place and time. Mental status is at baseline.          Diagnostic Studies        Lab Results: I have reviewed the following results:     Medications:  Scheduled PRN   aspirin, 81 mg, Daily  atorvastatin, 40 mg, QPM  calcium acetate, 667 mg, TID With Meals  chlorhexidine, 15 mL, Q12H HALIE  cinacalcet, 60 mg, Daily  escitalopram, 20 mg, Daily  famotidine, 40 mg, QAM  gabapentin, 100 mg, Early Morning  gabapentin, 200 mg, HS  heparin (porcine), 5,000 Units, Q8H HALIE  hydrALAZINE, 50 mg, Q8H HALIE  labetalol, 400 mg, TID  losartan, 100 mg, Daily  NIFEdipine, 90 mg, Daily  patient maintained insulin pump, 1 each, Q8H      acetaminophen, 650 mg, Q6H PRN  hydrALAZINE, 20 mg, Q6H PRN  insulin aspart, 100 Units, Daily PRN  ondansetron, 4 mg, Q6H PRN       Continuous          Labs:   CBC    Recent Labs      01/04/25 1935 01/04/25  2324 01/05/25  0555   WBC 5.30  --  4.80   HGB 11.8*  --  10.9*   HCT 36.4*  --  33.3*    189 211     BMP    Recent Labs     01/04/25 1935 01/05/25  0555   SODIUM 141 142   K 4.6 4.4   CL 98 101   CO2 32 30   AGAP 11 11   BUN 22 24   CREATININE 9.02* 9.71*   CALCIUM 9.1 8.5       Coags    Recent Labs     01/04/25 1935 01/05/25  0555   INR 0.90 1.01   PTT 31  --         Additional Electrolytes  Recent Labs     01/04/25 1935 01/05/25  0555   MG 2.5 2.4   PHOS  --  4.8*          Blood Gas    No recent results  No recent results LFTs  Recent Labs     01/04/25 1935 01/05/25  0555   ALT 12 9   AST 19 18   ALKPHOS 95 75   ALB 4.4 3.9   TBILI 0.59 0.50       Infectious  No recent results  Glucose  Recent Labs     01/04/25 1935 01/05/25  0555   GLUC 162* 144*

## 2025-01-06 NOTE — ASSESSMENT & PLAN NOTE
Patient is MWF dialysis   Last dialysis was this past Friday     Plan  Plan for dialysis tomorrow   Nephrology consulted, appreciated recommendations     Lab Results   Component Value Date    EGFR 5 01/05/2025    EGFR 6 01/04/2025    EGFR 8 12/09/2024    CREATININE 9.71 (H) 01/05/2025    CREATININE 9.02 (H) 01/04/2025    CREATININE 7.36 (H) 12/09/2024      - Kidney function baseline appears to fluctuate between 7 and 10.   - Hemodialysis Monday Wednesday Fridays   - Nephrology consult placed   - HD scheduled

## 2025-01-06 NOTE — PROGRESS NOTES
Progress Note - Nephrology   Name: Mateus Mckeon 39 y.o. male I MRN: [id number]  Unit/Bed#: ICU 11 I Date of Admission: 1/4/2025   Date of Service: 1/6/2025 I Hospital Day: 2     Assessment & Plan  ESRD on hemodialysis ([facility])  #ESRD on HD MWF:  Dialysis unit/days: [facility]  Access: AV fistula  Currently on dialysis, well-tolerated  Plan to continue to use per schedule  Renal Diet  Fluid restriction 1.8 L /d  Adjust medications to GFR<10  Avoid opioids     Benign hypertension with ESRD (end-stage renal disease) ([facility])  Lab Results   Component Value Date    EGFR 4 01/06/2025    EGFR 5 01/05/2025    EGFR 6 01/04/2025    CREATININE 12.10 (H) 01/06/2025    CREATININE 9.71 (H) 01/05/2025    CREATININE 9.02 (H) 01/04/2025     Volume: Hypervolemic  Blood pressure: Hypertensive, /93 at initiation of dialysis  Low-sodium diet  Continue clonidine 0.2 mg twice daily  Continue hydralazine 100 mg 3 times daily  Labetalol 400 mg 3 times daily  Losartan 100 mg at bedtime  Nifedipine 120 mg every 24 hours, patient take it at bedtime  UF on HD  Type 1 diabetes mellitus ([facility])  Insulin as per primary team   Influenza B  Management per ICU  Anemia in chronic kidney disease, on chronic dialysis ([facility])  Current hemoglobin: 11.1 mg/dL  Treatment:  EPO  Transfuse for hemoglobin less than 7.0 per primary service      Secondary hyperparathyroidism of renal origin ([facility])  PhosLo 3 times daily with meals  Cinacalcet 60 mg daily    HEMODIALYSIS PROCEDURE NOTE  The patient was seen and examined on hemodialysis. The patient is tolerating the procedure well.  Time: 4 hours  Sodium: 135 Blood flow: 450   Dialyzer: F160 Potassium: 2 Dialysate flow: 800   Access: AVF Bicarbonate: 35 Ultrafiltration goal: 89.4kg, 3.5L   Medications on HD: none       I have reviewed the nephrology recommendations including hemodialysis today with ultrafiltration, with primary team, and we are in agreement with renal plan including the information outlined above. I  have discussed the above management plan in detail with the primary service.     Subjective   Brief History of Admission - 38 yo with PMH of ESRD on HD at [facility] MWF, hypertension, multiple admissions with fluid overload and hypertension urgency, CVA, ICH, COVID-19 in October p/feeling unwell.  Nephrology is consulted for management of ESRD    Currently on dialysis, feels well, no shortness of breath, no chest pain.    Objective :  Temp:  [98 °F (36.7 °C)-98.5 °F (36.9 °C)] 98.3 °F (36.8 °C)  HR:  [65-86] 72  BP: (123-214)/(69-98) 191/90  Resp:  [4-32] 15  SpO2:  [96 %-100 %] 97 %  O2 Device: None (Room air)    Current Weight: Weight - Scale: 91.2 kg (201 lb 1 oz)  First Weight: Weight - Scale: 91 kg (200 lb 9.9 oz)  I/O         01/04 0701  01/05 0700 01/05 0701  01/06 0700 01/06 0701  01/07 0700    P.O. 340 600     I.V. (mL/kg) 40 (0.4) 1815.8 (19.9) 500 (5.5)    Total Intake(mL/kg) 380 (4.2) 2415.8 (26.5) 500 (5.5)    Urine (mL/kg/hr) 0 0 (0)     Other   4000    Stool 0      Total Output 0 0 4000    Net +380 +2415.8 -3500           Unmeasured Stool Occurrence 1 x            Physical Exam  General:  no acute distress at this time  Skin:  No acute rash  Eyes:  No scleral icterus and noninjected  ENT:  mucous membranes moist  Neck:  no carotid bruits  Chest:  Clear to auscultation percussion, good respiratory effort, no use of accessory respiratory muscles  CVS:  Regular rate and rhythm without rub   Abdomen:  soft and nontender   Extremities: lower extremity edema  Neuro:  No gross focality  Psych:  Alert , cooperative   Dialysis access: AV fistula    Medications:    Current Facility-Administered Medications:     acetaminophen (TYLENOL) tablet 650 mg, 650 mg, Oral, Q6H PRN, Anny Vasquez PA-C, 650 mg at 01/05/25 1546    aspirin (ECOTRIN LOW STRENGTH) EC tablet 81 mg, 81 mg, Oral, Daily, Holden Singh MD, 81 mg at 01/06/25 0806    atorvastatin (LIPITOR) tablet 40 mg, 40 mg, Oral, QPM, Holden Singh MD, 40 mg  at 01/05/25 1753    calcium acetate (PHOSLO) capsule 667 mg, 667 mg, Oral, TID With Meals, Quinten Henson MD, 667 mg at 01/06/25 0806    chlorhexidine (PERIDEX) 0.12 % oral rinse 15 mL, 15 mL, Mouth/Throat, Q12H HALIE, Holden Singh MD, 15 mL at 01/06/25 0806    cinacalcet (SENSIPAR) tablet 60 mg, 60 mg, Oral, Daily, Holden Singh MD, 60 mg at 01/06/25 0812    cloNIDine (CATAPRES) tablet 0.2 mg, 0.2 mg, Oral, Q12H HALIE, DEISI Rico    escitalopram (LEXAPRO) tablet 20 mg, 20 mg, Oral, Daily, Holden Singh MD, 20 mg at 01/06/25 0806    famotidine (PEPCID) tablet 10 mg, 10 mg, Oral, Daily, Cody Martini MD    gabapentin (NEURONTIN) capsule 100 mg, 100 mg, Oral, Early Morning, Holden Singh MD, 100 mg at 01/06/25 0519    gabapentin (NEURONTIN) capsule 200 mg, 200 mg, Oral, HS, Holden Singh MD, 200 mg at 01/05/25 2127    heparin (porcine) subcutaneous injection 5,000 Units, 5,000 Units, Subcutaneous, Q8H HALIE, DEISI Sheridan, 5,000 Units at 01/06/25 0806    hydrALAZINE (APRESOLINE) tablet 100 mg, 100 mg, Oral, Q8H HALIE, DEISI Rico    insulin aspart (NovoLOG) FOR PUMP REFILLS 100 Units, 100 Units, Subcutaneous Insulin Pump, Daily PRN, Holden Singh MD    labetalol (NORMODYNE) injection 20 mg, 20 mg, Intravenous, Q6H PRN, Cody Martini MD, 20 mg at 01/06/25 1027    labetalol (NORMODYNE) tablet 400 mg, 400 mg, Oral, TID, Holden Singh MD, 400 mg at 01/06/25 0806    losartan (COZAAR) tablet 100 mg, 100 mg, Oral, Daily, Anny Vasquez PA-C, 100 mg at 01/06/25 0806    [START ON 1/7/2025] NIFEdipine (PROCARDIA XL) 24 hr tablet 120 mg, 120 mg, Oral, Daily, Cody Martini MD    ondansetron (ZOFRAN) injection 4 mg, 4 mg, Intravenous, Q6H PRN, Holden Singh MD, 4 mg at 01/06/25 1102    oseltamivir (TAMIFLU) capsule 30 mg, 30 mg, Oral, Once per day on Monday Wednesday Friday, Cody Martini MD    PATIENT  "MAINTAINED INSULIN PUMP 1 each, 1 each, Subcutaneous, Q8H, Holden Singh MD      Lab Results: I have reviewed the following results:  Results from last 7 days   Lab Units 01/06/25  0446 01/05/25  0555 01/04/25  2324 01/04/25  1935   WBC Thousand/uL 5.07 4.80  --  5.30   HEMOGLOBIN g/dL 11.1* 10.9*  --  11.8*   HEMATOCRIT % 33.9* 33.3*  --  36.4*   PLATELETS Thousands/uL 243 211 189 227   POTASSIUM mmol/L 4.4 4.4  --  4.6   CHLORIDE mmol/L 100 101  --  98   CO2 mmol/L 29 30  --  32   BUN mg/dL 32* 24  --  22   CREATININE mg/dL 12.10* 9.71*  --  9.02*   CALCIUM mg/dL 8.5 8.5  --  9.1   MAGNESIUM mg/dL 2.4 2.4  --  2.5   PHOSPHORUS mg/dL  --  4.8*  --   --    ALBUMIN g/dL  --  3.9  --  4.4       Administrative Statements     Portions of the record may have been created with voice recognition software. Occasional wrong word or \"sound a like\" substitutions may have occurred due to the inherent limitations of voice recognition software. Read the chart carefully and recognize, using context, where substitutions have occurred.If you have any questions, please contact the dictating provider.  "

## 2025-01-06 NOTE — PLAN OF CARE
Problem: PAIN - ADULT  Goal: Verbalizes/displays adequate comfort level or baseline comfort level  Description: Interventions:  - Encourage patient to monitor pain and request assistance  - Assess pain using appropriate pain scale  - Administer analgesics based on type and severity of pain and evaluate response  - Implement non-pharmacological measures as appropriate and evaluate response  - Consider cultural and social influences on pain and pain management  - Notify physician/advanced practitioner if interventions unsuccessful or patient reports new pain  Outcome: Progressing     Problem: INFECTION - ADULT  Goal: Absence or prevention of progression during hospitalization  Description: INTERVENTIONS:  - Assess and monitor for signs and symptoms of infection  - Monitor lab/diagnostic results  - Monitor all insertion sites, i.e. indwelling lines, tubes, and drains  - Monitor endotracheal if appropriate and nasal secretions for changes in amount and color  - [place] appropriate cooling/warming therapies per order  - Administer medications as ordered  - Instruct and encourage patient and family to use good hand hygiene technique  - Identify and instruct in appropriate isolation precautions for identified infection/condition  Outcome: Progressing  Goal: Absence of fever/infection during neutropenic period  Description: INTERVENTIONS:  - Monitor WBC    Outcome: Progressing     Problem: SAFETY ADULT  Goal: Patient will remain free of falls  Description: INTERVENTIONS:  - Educate patient/family on patient safety including physical limitations  - Instruct patient to call for assistance with activity   - Consult OT/PT to assist with strengthening/mobility   - Keep Call bell within reach  - Keep bed low and locked with side rails adjusted as appropriate  - Keep care items and personal belongings within reach  - Initiate and maintain comfort rounds  - Make Fall Risk Sign visible to staff  - Offer Toileting every  Hours,  in advance of need  - Initiate/Maintain alarm  - Obtain necessary fall risk management equipment:   - Apply yellow socks and bracelet for high fall risk patients  - Consider moving patient to room near nurses station  Outcome: Progressing  Goal: Maintain or return to baseline ADL function  Description: INTERVENTIONS:  -  Assess patient's ability to carry out ADLs; assess patient's baseline for ADL function and identify physical deficits which impact ability to perform ADLs (bathing, care of mouth/teeth, toileting, grooming, dressing, etc.)  - Assess/evaluate cause of self-care deficits   - Assess range of motion  - Assess patient's mobility; develop plan if impaired  - Assess patient's need for assistive devices and provide as appropriate  - Encourage maximum independence but intervene and supervise when necessary  - Involve family in performance of ADLs  - Assess for home care needs following discharge   - Consider OT consult to assist with ADL evaluation and planning for discharge  - Provide patient education as appropriate  Outcome: Progressing  Goal: Maintains/Returns to pre admission functional level  Description: INTERVENTIONS:  - Perform AM-PAC 6 Click Basic Mobility/ Daily Activity assessment daily.  - Set and communicate daily mobility goal to care team and patient/family/caregiver.   - Collaborate with rehabilitation services on mobility goals if consulted  - Perform Range of Motion  times a day.  - Reposition patient every  hours.  - Dangle patient  times a day  - Stand patient  times a day  - Ambulate patient  times a day  - Out of bed to chair  times a day   - Out of bed for meals  times a day  - Out of bed for toileting  - Record patient progress and toleration of activity level   Outcome: Progressing     Problem: DISCHARGE PLANNING  Goal: Discharge to home or other facility with appropriate resources  Description: INTERVENTIONS:  - Identify barriers to discharge w/patient and caregiver  - Arrange for  needed discharge resources and transportation as appropriate  - Identify discharge learning needs (meds, wound care, etc.)  - Arrange for interpretive services to assist at discharge as needed  - Refer to Case Management Department for coordinating discharge planning if the patient needs post-hospital services based on physician/advanced practitioner order or complex needs related to functional status, cognitive ability, or social support system  Outcome: Progressing     Problem: Knowledge Deficit  Goal: Patient/family/caregiver demonstrates understanding of disease process, treatment plan, medications, and discharge instructions  Description: Complete learning assessment and assess knowledge base.  Interventions:  - Provide teaching at level of understanding  - Provide teaching via preferred learning methods  Outcome: Progressing

## 2025-01-06 NOTE — ASSESSMENT & PLAN NOTE
PhosLo 3 times daily with meals  Cinacalcet 60 mg daily    HEMODIALYSIS PROCEDURE NOTE  The patient was seen and examined on hemodialysis. The patient is tolerating the procedure well.  Time: 4 hours  Sodium: 135 Blood flow: 450   Dialyzer: F160 Potassium: 2 Dialysate flow: 800   Access: AVF Bicarbonate: 35 Ultrafiltration goal: 89.4kg, 3.5L   Medications on HD: none

## 2025-01-06 NOTE — ASSESSMENT & PLAN NOTE
Lab Results   Component Value Date    EGFR 5 01/05/2025    EGFR 6 01/04/2025    EGFR 8 12/09/2024    CREATININE 9.71 (H) 01/05/2025    CREATININE 9.02 (H) 01/04/2025    CREATININE 7.36 (H) 12/09/2024      Last Assessment & Plan:    Formatting of this note might be different from the original.   -received Epo 1/3   -goal Hb>7 before transfusing   -looks clinically well   -follow up with Nephro

## 2025-01-06 NOTE — QUICK NOTE
Spoke with patient's mother Francine and provided clinical update with today's plan and answered all questions.

## 2025-01-07 ENCOUNTER — APPOINTMENT (OUTPATIENT)
Facility: CLINIC | Age: 40
End: 2025-01-07
Payer: MEDICARE

## 2025-01-07 ENCOUNTER — TRANSITIONAL CARE MANAGEMENT (OUTPATIENT)
Age: 40
End: 2025-01-07

## 2025-01-07 ENCOUNTER — TELEPHONE (OUTPATIENT)
Age: 40
End: 2025-01-07

## 2025-01-07 VITALS
DIASTOLIC BLOOD PRESSURE: 87 MMHG | OXYGEN SATURATION: 98 % | SYSTOLIC BLOOD PRESSURE: 148 MMHG | TEMPERATURE: 97.9 F | BODY MASS INDEX: 28.15 KG/M2 | WEIGHT: 201.06 LBS | HEIGHT: 71 IN | RESPIRATION RATE: 16 BRPM | HEART RATE: 76 BPM

## 2025-01-07 LAB
ANION GAP SERPL CALCULATED.3IONS-SCNC: 10 MMOL/L (ref 4–13)
BUN SERPL-MCNC: 21 MG/DL (ref 5–25)
CALCIUM SERPL-MCNC: 8.1 MG/DL (ref 8.4–10.2)
CHLORIDE SERPL-SCNC: 102 MMOL/L (ref 96–108)
CO2 SERPL-SCNC: 29 MMOL/L (ref 21–32)
CREAT SERPL-MCNC: 8.77 MG/DL (ref 0.6–1.3)
ERYTHROCYTE [DISTWIDTH] IN BLOOD BY AUTOMATED COUNT: 14.9 % (ref 11.6–15.1)
GFR SERPL CREATININE-BSD FRML MDRD: 6 ML/MIN/1.73SQ M
GLUCOSE SERPL-MCNC: 133 MG/DL (ref 65–140)
GLUCOSE SERPL-MCNC: 173 MG/DL (ref 65–140)
GLUCOSE SERPL-MCNC: 214 MG/DL (ref 65–140)
HCT VFR BLD AUTO: 33 % (ref 36.5–49.3)
HGB BLD-MCNC: 10.6 G/DL (ref 12–17)
MAGNESIUM SERPL-MCNC: 2.3 MG/DL (ref 1.9–2.7)
MCH RBC QN AUTO: 32.4 PG (ref 26.8–34.3)
MCHC RBC AUTO-ENTMCNC: 32.1 G/DL (ref 31.4–37.4)
MCV RBC AUTO: 101 FL (ref 82–98)
MRSA NOSE QL CULT: NORMAL
PHOSPHATE SERPL-MCNC: 4.8 MG/DL (ref 2.7–4.5)
PLATELET # BLD AUTO: 282 THOUSANDS/UL (ref 149–390)
PMV BLD AUTO: 10.5 FL (ref 8.9–12.7)
POTASSIUM SERPL-SCNC: 4.2 MMOL/L (ref 3.5–5.3)
RBC # BLD AUTO: 3.27 MILLION/UL (ref 3.88–5.62)
SODIUM SERPL-SCNC: 141 MMOL/L (ref 135–147)
WBC # BLD AUTO: 4.56 THOUSAND/UL (ref 4.31–10.16)

## 2025-01-07 PROCEDURE — 99239 HOSP IP/OBS DSCHRG MGMT >30: CPT | Performed by: INTERNAL MEDICINE

## 2025-01-07 PROCEDURE — 99232 SBSQ HOSP IP/OBS MODERATE 35: CPT | Performed by: STUDENT IN AN ORGANIZED HEALTH CARE EDUCATION/TRAINING PROGRAM

## 2025-01-07 PROCEDURE — NC001 PR NO CHARGE: Performed by: INTERNAL MEDICINE

## 2025-01-07 PROCEDURE — 97166 OT EVAL MOD COMPLEX 45 MIN: CPT

## 2025-01-07 PROCEDURE — 80048 BASIC METABOLIC PNL TOTAL CA: CPT

## 2025-01-07 PROCEDURE — 84100 ASSAY OF PHOSPHORUS: CPT

## 2025-01-07 PROCEDURE — 83735 ASSAY OF MAGNESIUM: CPT

## 2025-01-07 PROCEDURE — 82948 REAGENT STRIP/BLOOD GLUCOSE: CPT

## 2025-01-07 PROCEDURE — 85027 COMPLETE CBC AUTOMATED: CPT

## 2025-01-07 RX ORDER — TORSEMIDE 20 MG/1
20 TABLET ORAL DAILY
Status: DISCONTINUED | OUTPATIENT
Start: 2025-01-08 | End: 2025-01-07

## 2025-01-07 RX ORDER — FAMOTIDINE 10 MG
10 TABLET ORAL DAILY
Qty: 30 TABLET | Refills: 2 | Status: SHIPPED | OUTPATIENT
Start: 2025-01-07 | End: 2025-04-07

## 2025-01-07 RX ORDER — DOXAZOSIN 1 MG/1
2 TABLET ORAL DAILY
Status: DISCONTINUED | OUTPATIENT
Start: 2025-01-07 | End: 2025-01-07 | Stop reason: HOSPADM

## 2025-01-07 RX ORDER — HYDRALAZINE HYDROCHLORIDE 100 MG/1
100 TABLET, FILM COATED ORAL EVERY 8 HOURS SCHEDULED
Qty: 90 TABLET | Refills: 2 | Status: SHIPPED | OUTPATIENT
Start: 2025-01-07 | End: 2025-04-07

## 2025-01-07 RX ORDER — TORSEMIDE 100 MG/1
100 TABLET ORAL DAILY
Status: DISCONTINUED | OUTPATIENT
Start: 2025-01-07 | End: 2025-01-07

## 2025-01-07 RX ORDER — OSELTAMIVIR PHOSPHATE 30 MG/1
30 CAPSULE ORAL
Qty: 2 CAPSULE | Refills: 0 | Status: SHIPPED | OUTPATIENT
Start: 2025-01-07 | End: 2025-01-08

## 2025-01-07 RX ORDER — DOXAZOSIN 2 MG/1
2 TABLET ORAL DAILY
Qty: 30 TABLET | Refills: 2 | Status: SHIPPED | OUTPATIENT
Start: 2025-01-08 | End: 2025-01-13

## 2025-01-07 RX ORDER — TORSEMIDE 100 MG/1
100 TABLET ORAL DAILY
Status: DISCONTINUED | OUTPATIENT
Start: 2025-01-08 | End: 2025-01-07

## 2025-01-07 RX ORDER — OSELTAMIVIR PHOSPHATE 30 MG/1
30 CAPSULE ORAL
Qty: 3 CAPSULE | Refills: 0 | Status: SHIPPED | OUTPATIENT
Start: 2025-01-07 | End: 2025-01-07

## 2025-01-07 RX ORDER — TORSEMIDE 100 MG/1
100 TABLET ORAL DAILY
Status: DISCONTINUED | OUTPATIENT
Start: 2025-01-08 | End: 2025-01-07 | Stop reason: HOSPADM

## 2025-01-07 RX ORDER — LOSARTAN POTASSIUM 100 MG/1
100 TABLET ORAL
Qty: 30 TABLET | Refills: 2 | Status: SHIPPED | OUTPATIENT
Start: 2025-01-07

## 2025-01-07 RX ORDER — LABETALOL 200 MG/1
400 TABLET, FILM COATED ORAL 3 TIMES DAILY
Qty: 180 TABLET | Refills: 2 | Status: SHIPPED | OUTPATIENT
Start: 2025-01-07

## 2025-01-07 RX ORDER — TORSEMIDE 100 MG/1
100 TABLET ORAL DAILY
Qty: 30 TABLET | Refills: 2 | Status: SHIPPED | OUTPATIENT
Start: 2025-01-08

## 2025-01-07 RX ORDER — CLONIDINE HYDROCHLORIDE 0.2 MG/1
0.2 TABLET ORAL EVERY 12 HOURS SCHEDULED
Qty: 60 TABLET | Refills: 2 | Status: SHIPPED | OUTPATIENT
Start: 2025-01-07 | End: 2025-01-13

## 2025-01-07 RX ORDER — TORSEMIDE 10 MG/1
10 TABLET ORAL DAILY
Status: DISCONTINUED | OUTPATIENT
Start: 2025-01-07 | End: 2025-01-07

## 2025-01-07 RX ADMIN — DOXAZOSIN 2 MG: 1 TABLET ORAL at 11:43

## 2025-01-07 RX ADMIN — LABETALOL HYDROCHLORIDE 20 MG: 5 INJECTION, SOLUTION INTRAVENOUS at 04:05

## 2025-01-07 RX ADMIN — ESCITALOPRAM OXALATE 20 MG: 20 TABLET ORAL at 09:08

## 2025-01-07 RX ADMIN — HYDRALAZINE HYDROCHLORIDE 100 MG: 25 TABLET ORAL at 14:26

## 2025-01-07 RX ADMIN — CHLORHEXIDINE GLUCONATE 15 ML: 1.2 RINSE ORAL at 09:09

## 2025-01-07 RX ADMIN — LABETALOL HYDROCHLORIDE 400 MG: 200 TABLET, FILM COATED ORAL at 09:08

## 2025-01-07 RX ADMIN — CALCIUM ACETATE 667 MG: 667 CAPSULE ORAL at 11:43

## 2025-01-07 RX ADMIN — HYDRALAZINE HYDROCHLORIDE 100 MG: 25 TABLET ORAL at 05:23

## 2025-01-07 RX ADMIN — FAMOTIDINE 10 MG: 20 TABLET, FILM COATED ORAL at 14:26

## 2025-01-07 RX ADMIN — CLONIDINE HYDROCHLORIDE 0.2 MG: 0.1 TABLET ORAL at 09:08

## 2025-01-07 RX ADMIN — GABAPENTIN 100 MG: 100 CAPSULE ORAL at 05:23

## 2025-01-07 RX ADMIN — ASPIRIN 81 MG: 81 TABLET, COATED ORAL at 09:08

## 2025-01-07 RX ADMIN — HEPARIN SODIUM 5000 UNITS: 5000 INJECTION INTRAVENOUS; SUBCUTANEOUS at 00:10

## 2025-01-07 RX ADMIN — CINACALCET 60 MG: 30 TABLET ORAL at 09:08

## 2025-01-07 RX ADMIN — HEPARIN SODIUM 5000 UNITS: 5000 INJECTION INTRAVENOUS; SUBCUTANEOUS at 09:09

## 2025-01-07 NOTE — PROGRESS NOTES
Progress Note - Critical Care/ICU   Name: Mateus Mckeon 39 y.o. male I MRN: [id number]  Unit/Bed#: ICU 11 I Date of Admission: 1/4/2025   Date of Service: 1/7/2025 I Hospital Day: 3      Critical Care Interval Transfer Note:    Brief Hospital Summary: 39-year-old male with history of type 2 diabetes, end-stage renal disease on dialysis Monday Wednesday Fridays as well as essential hypertension who presented to the ED for hypertensive urgency.  Patient was admitted to critical care on a Cardene drip.  Patient remained on a Cardene drip for a day then was able to be weaned off to as needed labetalol as well as p.o. medication.  Patient has required an increased dose of nifedipine over the second night to manage blood pressure.  At this time patient is only requiring p.o. medication.  Patient is back on home regiment.  Patient does have an insulin pump with insulin pump orders in as patient did not want to go onto an insulin drip.  Nephrology has been consulted and has set up hemodialysis while here in the hospital.  Patient did receive hemodialysis on Monday morning and is next due on Wednesday.    Barriers to discharge:   Blood pressure management     Consults: IP CONSULT TO NEPHROLOGY    Recommended to review admission imaging for incidental findings and document in discharge navigator: Chart reviewed, no known incidental findings noted at this time.      Discharge Plan: Anticipate discharge in 24-48 hrs to home.       Patient seen and evaluated by Critical Care today and deemed to be appropriate for transfer to Med Surg. Spoke to [facility] from [facility] to accept transfer. Critical care can be contacted via SecureChat with any questions or concerns. Please use the Critical Care AP Role in Secure Chat for any provider inquires until the patient is transferred out of the ICU or until tomorrow at 0600.

## 2025-01-07 NOTE — ASSESSMENT & PLAN NOTE
Monitor temperature  Patient received Tamiflu in ED  Complete Tamiflu for 4 doses on HD days.  2 doses remaining on discharge  Tylenol as needed  Symptomatic care

## 2025-01-07 NOTE — OCCUPATIONAL THERAPY NOTE
Occupational Therapy Evaluation     Patient Name: Mateus Mckeon  Today's Date: 1/7/2025  Problem List  Principal Problem:    Hypertensive urgency  Active Problems:    Type 1 diabetes mellitus (HCC)    Anemia in chronic kidney disease, on chronic dialysis (HCC)    ESRD on hemodialysis (HCC)    Secondary hyperparathyroidism of renal origin (HCC)    Influenza B    Benign hypertension with ESRD (end-stage renal disease) (HCC)    Past Medical History  Past Medical History:   Diagnosis Date    Acute kidney injury (HCC)     Ambulates with cane     Anuria     Anxiety     Cellulitis of right elbow 03/31/2021    Chronic kidney disease     COVID-19 10/18/2024    Depression     Diabetes mellitus (HCC)     Diarrhea     Emesis 10/24/2020    End stage renal disease (HCC) 02/11/2018    Formatting of this note might be different from the original. Last Assessment & Plan:  Secondary to DM.  On nightly PD.  Followed by Nephro.  Patient considering transplant for kidney and pancreas through [facility] Formatting of this note might be different from the original. Last Assessment & Plan:  Formatting of this note might be different from the original. 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    Esophagitis 07/21/2015    Falls     Gastroparesis     GERD (gastroesophageal reflux disease)     History of shingles 2010    History of transfusion 02/2018    no adverse reaction    Hyperlipidemia     Hyperphosphatemia     Hypertension     Hypoglycemia 07/15/2022    Itching     Mastoiditis of right side 07/15/2022    Muscle weakness     general unsteadiness    Obesity (BMI 30.0-34.9) 09/09/2019    Orthostatic hypotension 10/25/2020    Peripheral polyneuropathy 11/20/2019    PONV (postoperative nausea and vomiting) 01/26/2018    Protein-calorie malnutrition (HCC) 11/23/2020    Recurrent peritonitis (HCC) due to peritoneal dialysis catheter 07/31/2020    Retinopathy     Seizures (HCC)     early 2020 - one  time    Skin abnormality     some dime size areas where skin was scratched from itching    Sleep apnea     Spontaneous bacterial peritonitis (HCC) 10/19/2020    Squamous cell skin cancer     left temple    Stroke (HCC)     x2 - off balance/no driving/fatigue    Swelling of both lower extremities     Swelling of joint of upper arm, right 04/03/2024    Traumatic onycholysis 07/21/2022    Vomiting     Wears glasses     Word finding difficulty 11/05/2024     Past Surgical History  Past Surgical History:   Procedure Laterality Date    CARDIAC ELECTROPHYSIOLOGY PROCEDURE N/A 9/21/2023    Procedure: Cardiac loop recorder explant;  Surgeon: Parish Morgan MD;  [facility];  Service: Cardiology    CARDIAC LOOP RECORDER  05/2018    COLONOSCOPY      EGD      EYE SURGERY Right     HEMODIALYSIS ADULT  11/6/2024    IR AV FISTULAGRAM/GRAFTOGRAM  02/23/2021    IR CEREBRAL ANGIOGRAPHY  1/12/2024    IR CEREBRAL ANGIOGRAPHY / INTERVENTION  1/5/2024    IR TUNNELED CENTRAL LINE PLACEMENT  02/16/2021    IR TUNNELED DIALYSIS CATHETER PLACEMENT  11/18/2020    IR TUNNELED DIALYSIS CATHETER REMOVAL  02/12/2021    IR TUNNELED DIALYSIS CATHETER REMOVAL  03/11/2021    MOHS SURGERY Left 12/14/2022    Left temple with Dr. Hassan    PERITONEAL CATHETER INSERTION N/A 08/27/2018    Procedure: UNROOF PD CATHETER;  Surgeon: Felipe Lindo DO;  Location: [facility];  Service: General    OK ARTERIOVENOUS ANASTOMOSIS OPEN DIRECT Left 11/09/2020    Procedure: CREATION FISTULA  ARTERIOVENOUS (AV) - LEFT WRIST;  Surgeon: Placido Altamirano MD;  Location: [facility];  Service: Vascular    OK ESOPHAGOGASTRODUODENOSCOPY TRANSORAL DIAGNOSTIC N/A 04/18/2019    Procedure: ESOPHAGOGASTRODUODENOSCOPY (EGD);  Surgeon: Ale Figueroa MD;  Location: [facility];  Service: Gastroenterology    OK LAPS INSERTION TUNNELED INTRAPERITONEAL CATHETER N/A 08/06/2018    Procedure: LAPAROSCOPIC PD CATHETER PLACEMENT;  Surgeon: Felipe Lindo DO;  [facility];  Service: General    CA REMOVAL TUNNELED INTRAPERITONEAL CATHETER N/A 11/18/2020    Procedure: REMOVAL CATHETER PERITONEAL DIALYSIS;  Surgeon: Abdifatah Ty MD;  [facility];  Service: General    TONSILLECTOMY      UPPER GASTROINTESTINAL ENDOSCOPY       te/Time Temp Pulse Resp BP MAP (mmHg) SpO2 O2 Device Patient Position - Orthostatic VS   01/07/25 08:39:56 -- 77 -- 169/91 117 95 % -- --   01/07/25 08:30:43 -- 76 -- 172/88 Abnormal  116 98 % -- --        01/07/25 0831   OT Last Visit   OT Visit Date 01/07/25   Note Type   Note type Evaluation   Pain Assessment   Pain Assessment Tool 0-10   Pain Score 2   Pain Location/Orientation Orientation: Bilateral;Location: Foot   Pain Radiating Towards non radiating   Pain Onset/Description Onset: Ongoing  (neuropathy pain that is chronic)   Effect of Pain on Daily Activities limits comfort   Patient's Stated Pain Goal No pain   Hospital Pain Intervention(s) Repositioned;Ambulation/increased activity;Emotional support   Multiple Pain Sites No   Restrictions/Precautions   Weight Bearing Precautions Per Order No   Other Precautions Contact/isolation;Droplet precautions;Cognitive;Chair Alarm;Bed Alarm;Fall Risk;Pain  ((+) flu)   Home Living   Type of Home House   Home Layout 1/2 bath on main level;Bed/bath upstairs;Multi-level  (2 NIRU)   Bathroom Shower/Tub Tub/shower unit  (also has access to a walk in shower)   Bathroom Toilet Standard   Bathroom Equipment Grab bars in shower;Shower chair;Grab bars around toilet   Bathroom Accessibility Accessible   Home Equipment Grab bars;Cane  (rollator)   Additional Comments rollator use in crowds and longer distances, SPC for short distances   Prior Function   Level of [place] Independent with ADLs;Independent with functional mobility;Needs assistance with IADLS  (parents assist if needed)   Lives With Family  (parents)   Receives Help From Outpatient therapy;Family  (OPOT current w/ goals focused on improving fine motor  "coordination, dexterity, strength, UE function, and functional cognition.)   IADLs Family/Friend/Other provides transportation;Family/Friend/Other provides meals;Family/Friend/Other provides medication management  (cleans own room)   Falls in the last 6 months 0   Vocational On disability   Lifestyle   Autonomy Pt lives w/ parents in a 2 story house. Independent with basic ADLs, assist for IADLs and TOBI for fnxl mobility w/ use of AD. (-)  and (-) falls   Reciprocal Relationships Supportive family   Service to Others On disability   General   Additional Pertinent History Pt admit with hypertensive urgency. Admit to the ICU on a Cardene drip    PMHx:DM I,HTN, ESRD on HD (MWF), CAD, HL, CVA w/ residual right sided deficit, seizure (last one was 2019)   Family/Caregiver Present Yes  (supportive mother at bedside)   Subjective   Subjective \"I am still worried about my blood pressure\"   ADL   Eating Assistance 7  Independent   Grooming Assistance 7  Independent   UB Bathing Assistance 6  Modified Independent   LB Bathing Assistance 5  Supervision/Setup   UB Dressing Assistance 6  Modified independent   LB Dressing Assistance 4  Minimal Assistance   LB Dressing Deficit Tie shoes  (tie right sneaker, parents provide assist at baseline d/t decreased fine motor skills s/p CVA)   Toileting Assistance  5  Supervision/Setup   Additional Comments The above levels of assistance are anticipated based on functional performance deficits with use of clinical judgement.   Bed Mobility   Additional Comments Bed mobility NT: pt received sitting in recliner chair.   Transfers   Sit to Stand 6  Modified independent   Additional items Armrests   Stand to Sit 6  Modified independent   Additional items Armrests   Additional Comments No use of AD. No overt LOB or signs of instability   Functional Mobility   Functional Mobility 5  Supervision   Additional Comments distant supervision, progressing to Mod I for functional household " distance w/o use of AD. No overt LOB noted.   Additional items   (none)   Balance   Static Sitting Normal   Dynamic Sitting Good   Static Standing Fair   Dynamic Standing Fair   Activity Tolerance   Activity Tolerance Patient tolerated treatment well   Medical Staff Made Aware Spoke with CM, PT   Nurse Made Aware Spoke with RN pre/post   RUE Assessment   RUE Assessment X   RUE Strength   RUE Overall Strength Deficits  (hx of CVA, although able to perform ADL tasks w/ inc time. Assist PRN for tasks that involve use of fine motor and dexterity skills)   LUE Assessment   LUE Assessment WFL   Hand Function   Hand Function Comments R handed   Sensation   Light Touch Partial deficits in the RUE;Partial deficits in the LUE;Partial deficits in the RLE;Partial deficits in the LLE   Vision-Basic Assessment   Current Vision Wears glasses for distance only   Visual History Other (Comment)  (retinopathy)   Patient Visual Report Other (Comment)  (denies acute changes)   Cognition   Overall Cognitive Status Impaired   Arousal/Participation Alert;Responsive;Cooperative   Attention Within functional limits   Orientation Level Oriented X4   Memory Decreased recall of recent events;Decreased short term memory   Following Commands Follows one step commands without difficulty   Comments Pt ID via wristband, name and . Per EMR patient scored a 15/30 on the MOCA (2024). Deficits noted in memory recall, short term memory and problem-solving. Will continue to assess.   Assessment   Limitation Decreased ADL status;Decreased UE ROM;Decreased UE strength;Decreased Safe judgement during ADL;Decreased cognition;Decreased self-care trans;Decreased high-level ADLs   Prognosis Good   Assessment Patient is a 39 y.o. male seen for OT evaluation following admission on 2025  s/p Hypertensive urgency. Please see above for comprehensive list of comorbidities and significant PMHx impacting functional performance. Upon initial evaluation, pt  appears to be functioning close to/or at baseline with functional mobility and ADL tasks. The AM-PAC & Barthel Index outcome tools were used to assist in determining pt safety w/self care /mobility and appropriate d/c recommendations, see above for score. Occupational performance is affected by the following deficits: decreased dynamic balance impacting functional reach, impaired memory , and (+) pain . Personal/Environmental factors impacting D/C include: steps to enter/navigate the home, FOS to second floor, Assistance needed for ADL/IADLs, High fall risk , and health management. Supporting factors include: able to maintain FFSU, support system available, 24/7 supervision available , and attitude towards recovery. No further acute OT needs identified at this time. Recommend continued active ADL participation and mobilization with hospital staff while in the hospital to increase pt’s endurance and strength upon D/C. From OT standpoint, recommend D/C Level III (Minimum Resource Intensity) with family support when medically cleared. D/C pt from OT caseload at this time.   Goals   Patient Goals to have his BP managed and return home   Plan   OT Frequency Eval only   Discharge Recommendation   Rehab Resource Intensity Level, OT III (Minimum Resource Intensity)   AM-PAC Daily Activity Inpatient   Lower Body Dressing 3   Bathing 4   Toileting 4   Upper Body Dressing 4   Grooming 4   Eating 4   Daily Activity Raw Score 23   Daily Activity Standardized Score (Calc for Raw Score >=11) 51.12   AM-PAC Applied Cognition Inpatient   Following a Speech/Presentation 3   Understanding Ordinary Conversation 4   Taking Medications 3   Remembering Where Things Are Placed or Put Away 3   Remembering List of 4-5 Errands 3   Taking Care of Complicated Tasks 3   Applied Cognition Raw Score 19   Applied Cognition Standardized Score 39.77   Barthel Index   Feeding 10   Bathing 5   Grooming Score 5   Dressing Score 5   Bladder Score 10    Bowels Score 10   Toilet Use Score 10   Transfers (Bed/Chair) Score 15   Mobility (Level Surface) Score 0   Stairs Score 5   Barthel Index Score 75   End of Consult   Education Provided Yes;Family or social support of family present for education by provider  (mother present)   Patient Position at End of Consult Bedside chair;Bed/Chair alarm activated;All needs within reach   Nurse Communication Nurse aware of consult       The patient's raw score on the AM-PAC Daily Activity Inpatient Short Form is 23. A raw score of greater than or equal to 19 suggests the patient may benefit from discharge to home. Please refer to the recommendation of the Occupational Therapist for safe discharge planning.    Yaneli Wynn MS OTR/L   [place] Licensure# [id number]

## 2025-01-07 NOTE — ASSESSMENT & PLAN NOTE
Lab Results   Component Value Date    HGBA1C 6.9 (H) 11/05/2024     Recent Labs     01/06/25  1605 01/06/25 2059 01/07/25  0709 01/07/25  1106   POCGLU 213* 214* 214* 173*     Blood Sugar Average: Last 72 hrs:  (P) 182    Blood sugars between 139-214  Currently on insulin pump

## 2025-01-07 NOTE — TELEPHONE ENCOUNTER
Can you please have the pharmacy call and clarify from the prescribing physician? I did not order med for the patient.

## 2025-01-07 NOTE — PHYSICAL THERAPY NOTE
PHYSICAL THERAPY NOTE    Patient Name: Mateus Mckeon  Today's Date: 1/7/2025 01/07/25 0922   PT Last Visit   PT Visit Date 01/07/25   Note Type   Note type Screen   Additional Comments Chart reviewed, and spoke to Yaneli from OT who she saw patient earlier today.  She passed on that patient/family stated they did not need PT while here or prior to DC. Will screen from IPPT services      Sherron Shrestha, PT

## 2025-01-07 NOTE — ASSESSMENT & PLAN NOTE
Lab Results   Component Value Date    EGFR 6 01/07/2025    EGFR 4 01/06/2025    EGFR 5 01/05/2025    CREATININE 8.77 (H) 01/07/2025    CREATININE 12.10 (H) 01/06/2025    CREATININE 9.71 (H) 01/05/2025

## 2025-01-07 NOTE — ASSESSMENT & PLAN NOTE
Recent Labs     01/05/25  0555 01/06/25  0446 01/07/25  0526   HGB 10.9* 11.1* 10.6*     Current hemoglobin:  10.6 mg/dL  Patient receives EPO  Transfuse for hemoglobin less than 7.0

## 2025-01-07 NOTE — ASSESSMENT & PLAN NOTE
Patient has history of hypertension with end-stage renal disease.  Given 20 of labetalol and 10 of hydralazine in the ED with no relief.  Started on Cardene drip. Weaned off it since 6 pm on 1/5  Continue home meds, patient tolerating p.o  Received a hemodialysis yesterday with 3.5 L ultrafiltration which she tolerated well.  Started on clonidine 0.2 mg BID  Received labetalol this morning for /86.    Plan:  Continue clonidine 0.2 mg twice daily  Continue hydralazine 100 mg 3 times daily  Labetalol 400 mg 3 times daily  Losartan 100 mg at bedtime  Nifedipine 120 mg every 24 hours at bedtime.  Continue UF on HD, next one is scheduled for tomorrow  Started on Doxazosin 2 mg daily  Started on Torsemide 100 mg daily  Spoke with nephrology, we planned tentative dc later today if SBP remains 160-180

## 2025-01-07 NOTE — ASSESSMENT & PLAN NOTE
Recent Labs     01/05/25  0555 01/06/25  0446 01/07/25  0526   HGB 10.9* 11.1* 10.6*     Current hemoglobin:  10.6 mg/dL  Patient receives EPO

## 2025-01-07 NOTE — ASSESSMENT & PLAN NOTE
Lab Results   Component Value Date    EGFR 6 01/07/2025    EGFR 4 01/06/2025    EGFR 5 01/05/2025    CREATININE 8.77 (H) 01/07/2025    CREATININE 12.10 (H) 01/06/2025    CREATININE 9.71 (H) 01/05/2025     Baseline cret 7-10.  HD is on MWF.  Nephro on board

## 2025-01-07 NOTE — PLAN OF CARE
Problem: PAIN - ADULT  Goal: Verbalizes/displays adequate comfort level or baseline comfort level  Description: Interventions:  - Encourage patient to monitor pain and request assistance  - Assess pain using appropriate pain scale  - Administer analgesics based on type and severity of pain and evaluate response  - Implement non-pharmacological measures as appropriate and evaluate response  - Consider cultural and social influences on pain and pain management  - Notify physician/advanced practitioner if interventions unsuccessful or patient reports new pain  Outcome: Progressing     Problem: INFECTION - ADULT  Goal: Absence or prevention of progression during hospitalization  Description: INTERVENTIONS:  - Assess and monitor for signs and symptoms of infection  - Monitor lab/diagnostic results  - Monitor all insertion sites, i.e. indwelling lines, tubes, and drains  - Monitor endotracheal if appropriate and nasal secretions for changes in amount and color  - Delta appropriate cooling/warming therapies per order  - Administer medications as ordered  - Instruct and encourage patient and family to use good hand hygiene technique  - Identify and instruct in appropriate isolation precautions for identified infection/condition  Outcome: Progressing  Goal: Absence of fever/infection during neutropenic period  Description: INTERVENTIONS:  - Monitor WBC    Outcome: Progressing     Problem: SAFETY ADULT  Goal: Patient will remain free of falls  Description: INTERVENTIONS:  - Educate patient/family on patient safety including physical limitations  - Instruct patient to call for assistance with activity   - Consult OT/PT to assist with strengthening/mobility   - Keep Call bell within reach  - Keep bed low and locked with side rails adjusted as appropriate  - Keep care items and personal belongings within reach  - Initiate and maintain comfort rounds  - Make Fall Risk Sign visible to staff  - Offer Toileting every 2 Hours,  in advance of need  - Initiate/Maintain bed/chair alarm  - Obtain necessary fall risk management equipment: yellow bracelet/socks  - Apply yellow socks and bracelet for high fall risk patients  - Consider moving patient to room near nurses station  Outcome: Progressing  Goal: Maintain or return to baseline ADL function  Description: INTERVENTIONS:  -  Assess patient's ability to carry out ADLs; assess patient's baseline for ADL function and identify physical deficits which impact ability to perform ADLs (bathing, care of mouth/teeth, toileting, grooming, dressing, etc.)  - Assess/evaluate cause of self-care deficits   - Assess range of motion  - Assess patient's mobility; develop plan if impaired  - Assess patient's need for assistive devices and provide as appropriate  - Encourage maximum independence but intervene and supervise when necessary  - Involve family in performance of ADLs  - Assess for home care needs following discharge   - Consider OT consult to assist with ADL evaluation and planning for discharge  - Provide patient education as appropriate  Outcome: Progressing  Goal: Maintains/Returns to pre admission functional level  Description: INTERVENTIONS:  - Perform AM-PAC 6 Click Basic Mobility/ Daily Activity assessment daily.  - Set and communicate daily mobility goal to care team and patient/family/caregiver.   - Collaborate with rehabilitation services on mobility goals if consulted  - Perform Range of Motion 3 times a day.  - Reposition patient every 2 hours.  - Dangle patient 3 times a day  - Stand patient 3 times a day  - Ambulate patient 3 times a day  - Out of bed to chair 3 times a day   - Out of bed for meals 3 times a day  - Out of bed for toileting  - Record patient progress and toleration of activity level   Outcome: Progressing     Problem: DISCHARGE PLANNING  Goal: Discharge to home or other facility with appropriate resources  Description: INTERVENTIONS:  - Identify barriers to discharge  w/patient and caregiver  - Arrange for needed discharge resources and transportation as appropriate  - Identify discharge learning needs (meds, wound care, etc.)  - Arrange for interpretive services to assist at discharge as needed  - Refer to Case Management Department for coordinating discharge planning if the patient needs post-hospital services based on physician/advanced practitioner order or complex needs related to functional status, cognitive ability, or social support system  Outcome: Progressing     Problem: Knowledge Deficit  Goal: Patient/family/caregiver demonstrates understanding of disease process, treatment plan, medications, and discharge instructions  Description: Complete learning assessment and assess knowledge base.  Interventions:  - Provide teaching at level of understanding  - Provide teaching via preferred learning methods  Outcome: Progressing     Problem: METABOLIC, FLUID AND ELECTROLYTES - ADULT  Goal: Electrolytes maintained within normal limits  Description: INTERVENTIONS:  - Monitor labs and assess patient for signs and symptoms of electrolyte imbalances  - Administer electrolyte replacement as ordered  - Monitor response to electrolyte replacements, including repeat lab results as appropriate  - Instruct patient on fluid and nutrition as appropriate  Outcome: Progressing  Goal: Fluid balance maintained  Description: INTERVENTIONS:  - Monitor labs   - Monitor I/O and WT  - Instruct patient on fluid and nutrition as appropriate  - Assess for signs & symptoms of volume excess or deficit  Outcome: Progressing  Goal: Glucose maintained within target range  Description: INTERVENTIONS:  - Monitor Blood Glucose as ordered  - Assess for signs and symptoms of hyperglycemia and hypoglycemia  - Administer ordered medications to maintain glucose within target range  - Assess nutritional intake and initiate nutrition service referral as needed  Outcome: Progressing

## 2025-01-07 NOTE — NURSING NOTE
Patient scoring high fall risk on EPIC and high fall risk interventions have been implemented this far since transfer to unit. Patient requesting to sign refusal form for bed/chair alarm. RN educated patient and mother criteria that calculates fall risk score. Patient signed refusal form for bed/charm alarm and copy placed at nurses station for scanning purpose.

## 2025-01-07 NOTE — CASE MANAGEMENT
Case Management Discharge Planning Note    Patient name Mateus Mckeon  Location S /S -01 MRN [id number]  : 1985 Date 2025       Current Admission Date: 2025  Current Admission Diagnosis:Hypertensive urgency   Patient Active Problem List    Diagnosis Date Noted Date Diagnosed    Benign hypertension with ESRD (end-stage renal disease) ([facility]) 2025     Influenza B 2025     Thromboembolic disorder ([facility]) 2024     Paroxysmal atrial fibrillation ([facility]) 2024     Myoclonic jerking 2024     Chronic kidney disease-mineral and bone disorder 2024     Primary hypertension 10/18/2024     Secondary hyperparathyroidism of renal origin ([facility]) 10/18/2024     Non-aneurysmal perimesencephalic subarachnoid hemorrhage ([facility]) 2024     Arm paresthesia, right 2024     Subarachnoid hemorrhage ([facility]) 2024     Anemia due to chronic kidney disease, on chronic dialysis ([facility]) 2024     Type 1 diabetes mellitus on insulin therapy ([facility]) 2023     Hyperlipidemia 2023     Insomnia 2023     RACHEAL (obstructive sleep apnea)      Status post placement of implantable loop recorder 2022     Coronary artery disease involving native coronary artery of native heart without angina pectoris 2022     Hypothyroidism 2022     Cerebellar stroke syndrome 2022     Anemia in ESRD (end-stage renal disease)  ([facility]) 2022     Generalized anxiety disorder 2021     Medical cannabis use 04/15/2021     Moderate episode of recurrent major depressive disorder ([facility]) 04/15/2021     Tubulovillous adenoma of colon 2021     Gastroesophageal reflux disease without esophagitis 2021     ESRD on hemodialysis ([facility]) 2020     Secondary hyperparathyroidism ([facility]) 10/23/2020     Diabetic neuropathy ([facility]) 2020     Provoked seizure ([facility]) 2020     Asterixis 2020     Foot drop, bilateral 2020     Impaired mobility and  ADLs 02/29/2020     Vertigo 02/20/2020     Multiple pulmonary nodules 12/09/2019     Gastroparesis diabeticorum  (HCC) 09/17/2019     Pruritus 09/06/2019     Environmental and seasonal allergies 05/21/2019     Strabismus 09/24/2018     Dyslipidemia 08/24/2018     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) 06/04/2018     Renovascular hypertension 03/26/2018     Left atrial dilation 02/27/2018     Persistent proteinuria 02/11/2018     Anemia in chronic kidney disease, on chronic dialysis (HCC) 02/10/2018     Homozygous MTHFR mutation [id number] 02/05/2018     Hyperphosphatemia 01/29/2018     Vitamin D deficiency 01/29/2018     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,   01/28/2018     Binocular visual disturbance 01/28/2018     History of lacunar cerebrovascular accident (CVA) 01/22/2018     Type 1 diabetes mellitus (HCC) 06/19/2017     Hypertensive urgency 07/21/2015     Ataxia 07/21/2015     Cerebrovascular accident (CVA) of left pontine structure (HCC) 07/21/2015       LOS (days): 3  Geometric Mean LOS (GMLOS) (days): 4  Days to GMLOS:1.3     OBJECTIVE:  Risk of Unplanned Readmission Score: 34.92         Current admission status: Inpatient   Preferred Pharmacy:   [facility] [place], [address] [address]  Phone: [phone] Fax: [phone]    Primary Care Provider: Dania Sher DO    Primary Insurance: MEDICARE  Secondary Insurance: [facility]    DISCHARGE DETAILS:    Discharge planning discussed with:: Francine Mckeon (Mother)  Freedom of Choice: Yes  Comments - Freedom of Choice: Family is declining any HHC at this time choosing for BRIT of outpatient PT/OT  CM contacted family/caregiver?: Yes  Were Treatment Team discharge recommendations reviewed with patient/caregiver?: Yes  Did patient/caregiver verbalize understanding of patient care needs?: Yes  Were patient/caregiver advised of the risks  associated with not following Treatment Team discharge recommendations?: Yes    Contacts  Patient Contacts: Francine Mckeon (Mother)  Relationship to Patient:: Family  Contact Method: Phone  Phone Number: [phone]  Reason/Outcome: Discharge Planning, Emergency Contact, Continuity of Care    Requested Home Health Care         Is the patient interested in HHC at discharge?: No    DME Referral Provided  Referral made for DME?: No    Other Referral/Resources/Interventions Provided:  Interventions: Outpatient OT, Outpatient PT         Treatment Team Recommendation: Home, Outpatient Rehab  Discharge Destination Plan:: Home, Outpatient Rehab  Transport at Discharge : Family                             IMM Given (Date):: 01/07/25  IMM Given to:: Family  Family notified:: Reviewed with mother Francine over the phone     IMM reviewed with patient.  Patient agrees with discharge determination.     CM notified that patient is medically stable for DC home today.  CM called patient's mother to discuss if there were any HHC needs at DC.  She reported that they are good and plan on resuming care at outpatient PT/OT as that has been working out for them.    Family will be providing transportation home.    CM reviewed the availability of treatment team to discuss questions or concerns patient and/or family may have regarding  understanding medications and recognizing signs and symptoms once discharged.  CM also encouraged patient to follow up with all recommended appointments after discharge. Patient advised of importance for patient and family to participate in managing patient's medical well being.

## 2025-01-07 NOTE — PROGRESS NOTES
Progress Note - Nephrology   Name: Mateus Mckeon 39 y.o. male I MRN: [id number]  Unit/Bed#: S -01 I Date of Admission: 1/4/2025   Date of Service: 1/7/2025 I Hospital Day: 3     Assessment & Plan  ESRD on hemodialysis (HCC)  #ESRD on HD MWF:  Dialysis unit/days: [facility]  Access: AV fistula   Had dialysis yesterday, well-tolerated, continue with HD as per schedule    Renal Diet  Fluid restriction 1.8 L /d  Adjust medications to GFR<10  Avoid opioids     Benign hypertension with ESRD (end-stage renal disease) ([facility])  Lab Results   Component Value Date    EGFR 6 01/07/2025    EGFR 4 01/06/2025    EGFR 5 01/05/2025    CREATININE 8.77 (H) 01/07/2025    CREATININE 12.10 (H) 01/06/2025    CREATININE 9.71 (H) 01/05/2025     Volume: Euvolemic on exam  Blood pressure: Hypertensive but better control, /90  Continue with low-sodium diet  On clonidine 0.2 mg twice a day   Agree with doxazosin 2 mg  Continue hydralazine 100 mg 3 times daily  Labetalol 400 mg 3 times daily  Losartan 100 mg at bedtime  Can increase nifedipine to 180, patient take it at bedtime  Increase torsemide to 100 mg and assess urinary output  UF on HD  Type 1 diabetes mellitus ([facility])  Insulin as per primary team   Influenza B  Management as per primary team  Anemia in chronic kidney disease, on chronic dialysis ([facility])  Current hemoglobin: 10.6 mg/dL   Treatment:  EPO as an outpatient  Transfuse for hemoglobin less than 7.0 per primary service      Secondary hyperparathyroidism of renal origin ([facility])  PhosLo 3 times daily with meals  Cinacalcet 60 mg daily        I have reviewed the nephrology recommendations including agree with doxazosin and start torsemide 100 mg daily, with primary team, and we are in agreement with renal plan including the information outlined above. I have discussed the above management plan in detail with the primary service.     Subjective   Brief History of Admission -  38 yo with PMH of ESRD on HD at [facility] MWF, hypertension,  multiple admissions with fluid overload and hypertension urgency, CVA, ICH, COVID-19 in October p/feeling unwell.  Nephrology is consulted for management of ESRD     Feels well, no shortness of breath, no chest pain, had dialysis yesterday, well-tolerated    Objective :  Temp:  [97.9 °F (36.6 °C)-99 °F (37.2 °C)] 97.9 °F (36.6 °C)  HR:  [61-91] 78  BP: (142-201)/() 161/90  Resp:  [14-42] 16  SpO2:  [95 %-99 %] 98 %  O2 Device: None (Room air)    Current Weight: Weight - Scale: 91.2 kg (201 lb 1 oz)  First Weight: Weight - Scale: 91 kg (200 lb 9.9 oz)  I/O         01/05 0701  01/06 0700 01/06 0701  01/07 0700 01/07 0701  01/08 0700    P.O. 600 150     I.V. (mL/kg) 1815.8 (19.9) 500 (5.5)     Total Intake(mL/kg) 2415.8 (26.5) 650 (7.1)     Urine (mL/kg/hr) 0 (0) 60 (0)     Emesis/NG output  25     Other  4000     Stool  0     Total Output 0 4085     Net +2415.8 -3435            Unmeasured Stool Occurrence  1 x           Physical Exam  .General:  no acute distress at this time  Skin:  No acute rash  Eyes:  No scleral icterus and noninjected  ENT:  mucous membranes moist  Neck:  no carotid bruits  Chest:  Clear to auscultation percussion, good respiratory effort, no use of accessory respiratory muscles  CVS:  Regular rate and rhythm without rub   Abdomen:  soft and nontender   Extremities: no significant lower extremity edema  Neuro:  No gross focality  Psych:  Alert , cooperative   Dialysis access: AV fistula      Medications:    Current Facility-Administered Medications:     acetaminophen (TYLENOL) tablet 650 mg, 650 mg, Oral, Q6H PRN, Stephany José MD, 650 mg at 01/05/25 1546    aspirin (ECOTRIN LOW STRENGTH) EC tablet 81 mg, 81 mg, Oral, Daily, Stephany José MD, 81 mg at 01/07/25 0908    atorvastatin (LIPITOR) tablet 40 mg, 40 mg, Oral, QPM, Stephany José MD, 40 mg at 01/06/25 9373    calcium acetate (PHOSLO) capsule 667 mg, 667 mg, Oral, TID With Meals, Stephany José MD, 667 mg at 01/07/25 1143     chlorhexidine (PERIDEX) 0.12 % oral rinse 15 mL, 15 mL, Mouth/Throat, Q12H HALIE, Stephany José MD, 15 mL at 01/07/25 0909    cinacalcet (SENSIPAR) tablet 60 mg, 60 mg, Oral, Daily, Stephany José MD, 60 mg at 01/07/25 0908    cloNIDine (CATAPRES) tablet 0.2 mg, 0.2 mg, Oral, Q12H HALIE, Stephany José MD, 0.2 mg at 01/07/25 0908    doxazosin (CARDURA) tablet 2 mg, 2 mg, Oral, Daily, Stephany José MD, 2 mg at 01/07/25 1143    escitalopram (LEXAPRO) tablet 20 mg, 20 mg, Oral, Daily, Stephany José MD, 20 mg at 01/07/25 0908    famotidine (PEPCID) tablet 10 mg, 10 mg, Oral, Daily, Stephany José MD, 10 mg at 01/06/25 1508    gabapentin (NEURONTIN) capsule 100 mg, 100 mg, Oral, Early Morning, Stephany José MD, 100 mg at 01/07/25 0523    gabapentin (NEURONTIN) capsule 200 mg, 200 mg, Oral, HS, Stephany José MD, 200 mg at 01/06/25 2119    heparin (porcine) subcutaneous injection 5,000 Units, 5,000 Units, Subcutaneous, Q8H HALIE, Stephany José MD, 5,000 Units at 01/07/25 0909    hydrALAZINE (APRESOLINE) tablet 100 mg, 100 mg, Oral, Q8H HALIE, Stephany José MD, 100 mg at 01/07/25 0523    insulin aspart (NovoLOG) FOR PUMP REFILLS 100 Units, 100 Units, Subcutaneous Insulin Pump, Daily PRN, Stephany José MD    labetalol (NORMODYNE) injection 20 mg, 20 mg, Intravenous, Q6H PRN, Stephany José MD, 20 mg at 01/07/25 0405    labetalol (NORMODYNE) tablet 400 mg, 400 mg, Oral, TID, Stephany José MD, 400 mg at 01/07/25 0908    losartan (COZAAR) tablet 100 mg, 100 mg, Oral, HS, Stephany José MD    NIFEdipine (PROCARDIA XL) 24 hr tablet 120 mg, 120 mg, Oral, HS, Stephany José MD    ondansetron (ZOFRAN) injection 4 mg, 4 mg, Intravenous, Q6H PRN, Stephany José MD, 4 mg at 01/06/25 1102    oseltamivir (TAMIFLU) capsule 30 mg, 30 mg, Oral, Once per day on Monday Wednesday Friday, Stephany José MD, 30 mg at 01/06/25 1510    PATIENT MAINTAINED INSULIN PUMP 1 each, 1 each, Subcutaneous, Q8H, Stephany José MD, 1 each at 01/07/25 1145    [START ON  "1/8/2025] torsemide (DEMADEX) tablet 100 mg, 100 mg, Oral, Daily, Joselyn Reyes Bahamonde, MD      Lab Results: I have reviewed the following results:  Results from last 7 days   Lab Units 01/07/25  0526 01/06/25  0446 01/05/25  0555 01/04/25  2324 01/04/25  1935   WBC Thousand/uL 4.56 5.07 4.80  --  5.30   HEMOGLOBIN g/dL 10.6* 11.1* 10.9*  --  11.8*   HEMATOCRIT % 33.0* 33.9* 33.3*  --  36.4*   PLATELETS Thousands/uL 282 243 211 189 227   POTASSIUM mmol/L 4.2 4.4 4.4  --  4.6   CHLORIDE mmol/L 102 100 101  --  98   CO2 mmol/L 29 29 30  --  32   BUN mg/dL 21 32* 24  --  22   CREATININE mg/dL 8.77* 12.10* 9.71*  --  9.02*   CALCIUM mg/dL 8.1* 8.5 8.5  --  9.1   MAGNESIUM mg/dL 2.3 2.4 2.4  --  2.5   PHOSPHORUS mg/dL 4.8*  --  4.8*  --   --    ALBUMIN g/dL  --   --  3.9  --  4.4       Administrative Statements     Portions of the record may have been created with voice recognition software. Occasional wrong word or \"sound a like\" substitutions may have occurred due to the inherent limitations of voice recognition software. Read the chart carefully and recognize, using context, where substitutions have occurred.If you have any questions, please contact the dictating provider.  "

## 2025-01-07 NOTE — QUICK NOTE
Due to ICU admission overnight and lack of bed availability, patient was downgraded in this early morning. Transfer accepted by [facility]. Placed under Dr. Mckeon given she is in the active role for ICU transfers.     Please notify critical care with any questions/concerns.

## 2025-01-07 NOTE — DISCHARGE SUMMARY
Discharge Summary - Hospitalist   Name: Mateus Mckeon 39 y.o. male I MRN: [id number]  Unit/Bed#: S -01 I Date of Admission: 1/4/2025   Date of Service: 1/7/2025 I Hospital Day: 3     Assessment & Plan  Hypertensive urgency  Patient has history of hypertension with end-stage renal disease.  Given 20 of labetalol and 10 of hydralazine in the ED with no relief.  Started on Cardene drip. Weaned off it since 6 pm on 1/5  Continue home meds, patient tolerating p.o  Received a hemodialysis yesterday with 3.5 L ultrafiltration which she tolerated well.  Started on clonidine 0.2 mg BID  Received labetalol this morning for /86.    Plan:  Continue clonidine 0.2 mg twice daily  Continue hydralazine 100 mg 3 times daily  Labetalol 400 mg 3 times daily  Losartan 100 mg at bedtime  Nifedipine 120 mg every 24 hours at bedtime.  Continue UF on HD, next one is scheduled for tomorrow  Started on Doxazosin 2 mg daily  Started on Torsemide 100 mg daily  Discontinued olmesartan  Discharged home today    Type 1 diabetes mellitus ([facility])  Lab Results   Component Value Date    HGBA1C 6.9 (H) 11/05/2024     Recent Labs     01/06/25  1605 01/06/25  2059 01/07/25  0709 01/07/25  1106   POCGLU 213* 214* 214* 173*     Blood Sugar Average: Last 72 hrs:  (P) 182    Blood sugars between 139-214  Currently on insulin pump    Anemia in chronic kidney disease, on chronic dialysis ([facility])  Recent Labs     01/05/25  0555 01/06/25  0446 01/07/25  0526   HGB 10.9* 11.1* 10.6*     Current hemoglobin:  10.6 mg/dL  Patient receives EPO    ESRD on hemodialysis ([facility])  Lab Results   Component Value Date    EGFR 6 01/07/2025    EGFR 4 01/06/2025    EGFR 5 01/05/2025    CREATININE 8.77 (H) 01/07/2025    CREATININE 12.10 (H) 01/06/2025    CREATININE 9.71 (H) 01/05/2025     Baseline cret 7-10.  HD is on MWF.  Nephro on board  Influenza B  Monitor temperature  Patient received Tamiflu in ED  Complete Tamiflu for 4 doses on HD days.  2 doses remaining  on discharge  Tylenol as needed  Symptomatic care       Medical Problems       Resolved Problems  Date Reviewed: 12/26/2024   None       Discharging Physician / Practitioner: Stephany José MD  PCP: Dania Sher DO  Admission Date:   Admission Orders (From admission, onward)       Ordered        01/04/25 2244  Inpatient Admission  Once                          Discharge Date: 01/07/25    Consultations During Hospital Stay:  Nephrology  Critical care    Procedures Performed:   Hemodialysis    Significant Findings / Test Results:   CT head without contrast  Result Date: 1/4/2025  Impression: No acute intracranial abnormality. Workstation performed: [id number]       No Chest XR results available for this patient.       Incidental Findings:   None    Test Results Pending at Discharge (will require follow up):   None     Outpatient Tests Requested:  Recommend basic metabolic panel in 1 week    Complications: None    Reason for Admission: Hypertensive urgency    Hospital Course:   Mateus Mckeon is a 39 y.o. male patient who originally presented to the hospital on 1/4/2025 due to high blood pressure.  In the ED with elevated BP to 230s/100.  Complain of headache and episode of vomiting, no neurologic deficit.  No improvement with labetalol/hydralazine.  Patient admitted to  and started on nicardipine drip with improvement, weaned off it to and his home regimen with increments.  Nephrology was consulted as patient is on hemodialysis.  [facility], and his blood pressure did spike again in 200s so his nifedipine was increased, he was started on doxazosin and spironolactone after discussing with nephrology.  Patient's blood pressure has remained stable today.  He will continue taking Tamiflu for 2 more doses on days when he has dialysis after his dialysis.  Patient is stable for discharge to home with close follow-up in 1 week with primary care physician.      Please see above list of  diagnoses and related plan for additional information.     Condition at Discharge: good    Discharge Day Visit / Exam:   * Please refer to separate progress note for these details *    Discussion with Family: Updated  (mother) at bedside.    Discharge instructions/Information to patient and family:   See after visit summary for information provided to patient and family.      Provisions for Follow-Up Care:  See after visit summary for information related to follow-up care and any pertinent home health orders.      Mobility at time of Discharge:   Basic Mobility Inpatient Raw Score: 24  JH-HLM Goal: 8: Walk 250 feet or more  JH-HLM Achieved: 6: Walk 10 steps or more  HLM Goal NOT achieved. Continue to encourage mobility in post discharge setting.     Disposition:   Home    Planned Readmission: None    Discharge Medications:  See after visit summary for reconciled discharge medications provided to patient and/or family.      Administrative Statements   Discharge Statement:  I have spent a total time of 35 minutes in caring for this patient on the day of the visit/encounter. .    Nutrition Assessment and Intervention:     Reviewed food recall journal      Physical Activity Assessment and Intervention:    Activity journal reviewed      Tobacco and Toxic Substance Assessment and Intervention:     Tobacco use screening performed    Alcohol and drug use screening performed          **Please Note: This note may have been constructed using a voice recognition system**

## 2025-01-07 NOTE — NURSING NOTE
"At this time, upon arrival to patient room, patient begins screaming at RN saying \"I am overdue for my medications. I take my medications at 7/7:30 AM everyday.\" RN informed patient that medication pass here on S4 begins at 9 AM and we can begin as early as 8 AM and have until 10 AM to complete medications before considered late. Patient and patient's mother at bedside aggressively yelling at RN stating \"I can't wait until 10 AM to receive my medications as they are what has been keeping me alive and the reason I am here.\" Also, patient stated \"No one has checked my BP except the therapist not too long ago.\" RN explained that blood pressure was checked upon arrival to unit around 7:06 AM and per provider BP is to be checked every 2 hours. Attempted to provide education about blood pressure checks but could not successfully do so as patient and patient mother continuously kept yelling.Charge RN called to bedside to attempt to de-escalate situation.     [facility] senior resident (Dr. Stephany José) made aware of situation and will talk to patient during AM rounds to inform that monitoring BP per patient request of every 30 minutes is not necessary. At this time, Masimo is cycled to check patient BP q30 minutes per patient request.  "

## 2025-01-07 NOTE — ASSESSMENT & PLAN NOTE
Lab Results   Component Value Date    EGFR 4 01/06/2025    EGFR 5 01/05/2025    EGFR 6 01/04/2025    CREATININE 12.10 (H) 01/06/2025    CREATININE 9.71 (H) 01/05/2025    CREATININE 9.02 (H) 01/04/2025     Baseline cret 7-10.  HD is on MWF.  Nephro on board

## 2025-01-07 NOTE — ASSESSMENT & PLAN NOTE
Patient has history of hypertension with end-stage renal disease.  Given 20 of labetalol and 10 of hydralazine in the ED with no relief.  Started on Cardene drip. Weaned off it since 6 pm on 1/5  Continue home meds, patient tolerating p.o  Received a hemodialysis yesterday with 3.5 L ultrafiltration which she tolerated well.  Started on clonidine 0.2 mg BID  Received labetalol this morning for /86.    Plan:  Continue clonidine 0.2 mg twice daily  Continue hydralazine 100 mg 3 times daily  Labetalol 400 mg 3 times daily  Losartan 100 mg at bedtime  Nifedipine 120 mg every 24 hours at bedtime.  Continue UF on HD, next one is scheduled for tomorrow  Started on Doxazosin 2 mg daily  Started on Torsemide 100 mg daily  Discontinued olmesartan  Discharged home today

## 2025-01-07 NOTE — ASSESSMENT & PLAN NOTE
Current hemoglobin: 10.6 mg/dL   Treatment:  EPO as an outpatient  Transfuse for hemoglobin less than 7.0 per primary service

## 2025-01-07 NOTE — QUICK NOTE
Patient's nifedipine and losartan changed to daily at bedtime.  Patient will be given a single 60 mg dose of nifedipine tonight for blood pressure control.  This was discussed at signout.

## 2025-01-07 NOTE — DISCHARGE INSTR - AVS FIRST PAGE
Dear Mateus Mckeon,     It was our pleasure to care for you here at [facility].  It is our hope that we were always able to exceed the expected standards for your care during your stay.  You were hospitalized due to hypertensive urgency.  You were cared for on the 4th floor by Stephany José MD under the service of Ruiz Herring MD with the [facility] who covers for your primary care physician (PCP), Dania Sher DO, while you were hospitalized.  If you have any questions or concerns related to this hospitalization, you may contact us at .  For follow up as well as any medication refills, we recommend that you follow up with your primary care physician.  A registered nurse will reach out to you by phone within a few days after your discharge to answer any additional questions that you may have after going home.  However, at this time we provide for you here, the most important instructions / recommendations at discharge:     Notable Medication Adjustments -   START TAKING Clonidine 0.2 mg every 12 hours starting tonight.  START TAKING Doxazosin 2 mg daily starting tomorrow.  START TAKING Torsemide 100 mg daily starting tomorrow.  START TAKING Losartan 100 mg daily starting tomorrow.  START TAKING Nifedipine 120 mg daily at bedtime starting tomorrow.  START TAKING oseltamivir 30 mg for two doses on dialysis days 1/8 and 1/10  after your dialysis.  START TAKING Hydralazine 100 mg every 8 hours starting today.  STOP TAKING hydralazine 50 mg  STOP TAKING nifedipine 90 mg  STOP TAKING olmesartan 40 mg  Testing Required after Discharge -   Basic metabolic panel in 1 week  ** Please contact your PCP to request testing orders for any of the testing recommended here **  Important follow up information -   Please follow-up with your primary care physician in 1 week.  Other Instructions -   Please take a low-salt diet.  If you have  difficulty breathing, altered mental status, chest pain, worsening headaches associated with nausea and vomiting, please call your doctor or return to the emergency department.  Please review this entire after visit summary as additional general instructions including medication list, appointments, activity, diet, any pertinent wound care, and other additional recommendations from your care team that may be provided for you.      Sincerely,     Stephany José MD

## 2025-01-07 NOTE — TELEPHONE ENCOUNTER
[facility] called questioning the Tamiflu prescription instructions.  The prescriptions states dispense 2 tablets but take the Tamiflu on 3 different dates.  If taking only 2 days of Tamiflu please state which days.      Please fax new prescription over once fixed.

## 2025-01-07 NOTE — PROGRESS NOTES
Progress Note - Hospitalist   Name: Mateus Mckeon 39 y.o. male I MRN: [id number]  Unit/Bed#: ICU 11 I Date of Admission: 1/4/2025   Date of Service: 1/7/2025 I Hospital Day: 3    Assessment & Plan  Hypertensive urgency  Patient has history of hypertension with end-stage renal disease.  Given 20 of labetalol and 10 of hydralazine in the ED with no relief.  Started on Cardene drip. Weaned off it since 6 pm on 1/5  Continue home meds, patient tolerating p.o  Received a hemodialysis yesterday with 3.5 L ultrafiltration which she tolerated well.  Started on clonidine 0.2 mg BID  Received labetalol this morning for /86.    Plan:  Continue clonidine 0.2 mg twice daily  Continue hydralazine 100 mg 3 times daily  Labetalol 400 mg 3 times daily  Losartan 100 mg at bedtime  Nifedipine 120 mg every 24 hours at bedtime.  Continue UF on HD, next one is scheduled for tomorrow  Started on Doxazosin 2 mg daily  Started on Torsemide 100 mg daily  Spoke with nephrology, we planned tentative dc later today if SBP remains 160-180    Type 1 diabetes mellitus (Formerly [facility])  Lab Results   Component Value Date    HGBA1C 6.9 (H) 11/05/2024     Recent Labs     01/06/25  0839 01/06/25  1139 01/06/25  1605 01/06/25 2059   POCGLU 139 144* 213* 214*     Blood Sugar Average: Last 72 hrs:  (P) 179.125    Blood sugars between 139-214  Currently on insulin pump    Anemia in chronic kidney disease, on chronic dialysis (Formerly [facility])  Recent Labs     01/05/25  0555 01/06/25  0446 01/07/25  0526   HGB 10.9* 11.1* 10.6*     Current hemoglobin:  10.6 mg/dL  Patient receives EPO  Transfuse for hemoglobin less than 7.0    ESRD on hemodialysis (Formerly [facility])  Lab Results   Component Value Date    EGFR 4 01/06/2025    EGFR 5 01/05/2025    EGFR 6 01/04/2025    CREATININE 12.10 (H) 01/06/2025    CREATININE 9.71 (H) 01/05/2025    CREATININE 9.02 (H) 01/04/2025     Baseline cret 7-10.  HD is on MWF.  Nephro on board  Influenza B  Monitor temperature  Patient received Tamiflu  in ED  Continue Tamiflu for 4 doses on HD days  Tylenol as needed  Symptomatic care      VTE Pharmacologic Prophylaxis:   High Risk (Score >/= 5) - Pharmacological DVT Prophylaxis Ordered: heparin. Sequential Compression Devices Ordered.    Mobility:   Basic Mobility Inpatient Raw Score: 24  JH-HLM Goal: 8: Walk 250 feet or more  JH-HLM Achieved: 5: Stand (1 or more minutes)  JH-HLM Goal NOT achieved. Continue with multidisciplinary rounding and encourage appropriate mobility to improve upon JH-HLM goals.    Patient Centered Rounds: I performed bedside rounds with nursing staff today.   Discussions with Specialists or Other Care Team Provider: Attending    Education and Discussions with Family / Patient: Patient declined call to .     Current Length of Stay: 3 day(s)  Current Patient Status: Inpatient   Certification Statement: The patient, admitted on an observation basis, will now require > 2 midnight hospital stay due to IV nicardipine  Discharge Plan: Anticipate discharge later today or tomorrow to home.    Code Status: Level 1 - Full Code    Subjective   Patient appears in no acute distress. Denies any congestion. Denies any chest pain and endorses feeling much better.  He is concerned that his blood pressure is not being checked frequently.     Objective :  Temp:  [98 °F (36.7 °C)-99 °F (37.2 °C)] 98.5 °F (36.9 °C)  HR:  [61-86] 62  BP: (145-214)/(72-98) 189/91  Resp:  [14-42] 22  SpO2:  [96 %-99 %] 98 %  O2 Device: None (Room air)    Body mass index is 28.04 kg/m².     Input and Output Summary (last 24 hours):     Intake/Output Summary (Last 24 hours) at 1/7/2025 0552  Last data filed at 1/7/2025 0400  Gross per 24 hour   Intake 650 ml   Output 4085 ml   Net -3435 ml       Physical Exam  Vitals and nursing note reviewed.   Constitutional:       General: He is not in acute distress.     Appearance: He is well-developed.   HENT:      Head: Normocephalic and atraumatic.   Eyes:       Conjunctiva/sclera: Conjunctivae normal.   Cardiovascular:      Rate and Rhythm: Normal rate and regular rhythm.      Heart sounds: No murmur heard.  Pulmonary:      Effort: Pulmonary effort is normal. No respiratory distress.      Breath sounds: Normal breath sounds. No wheezing, rhonchi or rales.   Abdominal:      General: There is no distension.      Palpations: Abdomen is soft.      Tenderness: There is no abdominal tenderness. There is no guarding or rebound.      Comments: Insulin pump   Musculoskeletal:         General: No swelling. Normal range of motion.      Cervical back: Neck supple.      Comments: Left arm fistula   Skin:     General: Skin is warm and dry.      Capillary Refill: Capillary refill takes less than 2 seconds.   Neurological:      Mental Status: He is alert and oriented to person, place, and time.   Psychiatric:         Mood and Affect: Mood normal.           Lines/Drains:            Lab Results: I have reviewed the following results:   Results from last 7 days   Lab Units 01/07/25  0526 01/06/25 0446 01/05/25  0555   WBC Thousand/uL 4.56   < > 4.80   HEMOGLOBIN g/dL 10.6*   < > 10.9*   HEMATOCRIT % 33.0*   < > 33.3*   PLATELETS Thousands/uL 282   < > 211   SEGS PCT %  --   --  55   LYMPHO PCT %  --   --  28   MONO PCT %  --   --  9   EOS PCT %  --   --  7*    < > = values in this interval not displayed.     Results from last 7 days   Lab Units 01/06/25  0446 01/05/25  0555   SODIUM mmol/L 140 142   POTASSIUM mmol/L 4.4 4.4   CHLORIDE mmol/L 100 101   CO2 mmol/L 29 30   BUN mg/dL 32* 24   CREATININE mg/dL 12.10* 9.71*   ANION GAP mmol/L 11 11   CALCIUM mg/dL 8.5 8.5   ALBUMIN g/dL  --  3.9   TOTAL BILIRUBIN mg/dL  --  0.50   ALK PHOS U/L  --  75   ALT U/L  --  9   AST U/L  --  18   GLUCOSE RANDOM mg/dL 153* 144*     Results from last 7 days   Lab Units 01/05/25  0555   INR  1.01     Results from last 7 days   Lab Units 01/06/25  2059 01/06/25  1605 01/06/25  1139 01/06/25  0839  01/05/25  2134 01/05/25  1733 01/05/25  1143 01/05/25  0730   POC GLUCOSE mg/dl 214* 213* 144* 139 214* 190* 165* 154*               Recent Cultures (last 7 days):         Imaging Results Review: I reviewed radiology reports from this admission including: CT head.  Other Study Results Review: EKG was reviewed.     Last 24 Hours Medication List:     Current Facility-Administered Medications:     acetaminophen (TYLENOL) tablet 650 mg, Q6H PRN    aspirin (ECOTRIN LOW STRENGTH) EC tablet 81 mg, Daily    atorvastatin (LIPITOR) tablet 40 mg, QPM    calcium acetate (PHOSLO) capsule 667 mg, TID With Meals    chlorhexidine (PERIDEX) 0.12 % oral rinse 15 mL, Q12H HALIE    cinacalcet (SENSIPAR) tablet 60 mg, Daily    cloNIDine (CATAPRES) tablet 0.2 mg, Q12H HALIE    escitalopram (LEXAPRO) tablet 20 mg, Daily    famotidine (PEPCID) tablet 10 mg, Daily    gabapentin (NEURONTIN) capsule 100 mg, Early Morning    gabapentin (NEURONTIN) capsule 200 mg, HS    heparin (porcine) subcutaneous injection 5,000 Units, Q8H HALIE    hydrALAZINE (APRESOLINE) tablet 100 mg, Q8H HALIE    insulin aspart (NovoLOG) FOR PUMP REFILLS 100 Units, Daily PRN    labetalol (NORMODYNE) injection 20 mg, Q6H PRN    labetalol (NORMODYNE) tablet 400 mg, TID    losartan (COZAAR) tablet 100 mg, HS    NIFEdipine (PROCARDIA XL) 24 hr tablet 120 mg, HS    ondansetron (ZOFRAN) injection 4 mg, Q6H PRN    oseltamivir (TAMIFLU) capsule 30 mg, Once per day on Monday Wednesday Friday    PATIENT MAINTAINED INSULIN PUMP 1 each, Q8H    Nutrition Assessment and Intervention:     Reviewed food recall journal      Physical Activity Assessment and Intervention:    Activity journal reviewed      Tobacco and Toxic Substance Assessment and Intervention:     Tobacco use screening performed    Alcohol and drug use screening performed    Brief intervention performed for tobacco, alcohol, or drug use          Administrative Statements   Today, Patient Was Seen By: Stephany José,  MD        **Please Note: This note may have been constructed using a voice recognition system.**

## 2025-01-07 NOTE — ASSESSMENT & PLAN NOTE
Lab Results   Component Value Date    EGFR 6 01/07/2025    EGFR 4 01/06/2025    EGFR 5 01/05/2025    CREATININE 8.77 (H) 01/07/2025    CREATININE 12.10 (H) 01/06/2025    CREATININE 9.71 (H) 01/05/2025     Volume: Euvolemic on exam  Blood pressure: Hypertensive but better control, /90  Continue with low-sodium diet  On clonidine 0.2 mg twice a day   Agree with doxazosin 2 mg  Continue hydralazine 100 mg 3 times daily  Labetalol 400 mg 3 times daily  Losartan 100 mg at bedtime  Can increase nifedipine to 180, patient take it at bedtime  Increase torsemide to 100 mg and assess urinary output  UF on HD

## 2025-01-07 NOTE — ASSESSMENT & PLAN NOTE
Lab Results   Component Value Date    HGBA1C 6.9 (H) 11/05/2024     Recent Labs     01/06/25  0839 01/06/25  1139 01/06/25  1605 01/06/25 2059   POCGLU 139 144* 213* 214*     Blood Sugar Average: Last 72 hrs:  (P) 179.125    Blood sugars between 139-214  Currently on insulin pump

## 2025-01-07 NOTE — ASSESSMENT & PLAN NOTE
Monitor temperature  Patient received Tamiflu in ED  Continue Tamiflu for 4 doses on HD days  Tylenol as needed  Symptomatic care

## 2025-01-07 NOTE — ASSESSMENT & PLAN NOTE
Lab Results   Component Value Date    EGFR 4 01/06/2025    EGFR 5 01/05/2025    EGFR 6 01/04/2025    CREATININE 12.10 (H) 01/06/2025    CREATININE 9.71 (H) 01/05/2025    CREATININE 9.02 (H) 01/04/2025

## 2025-01-07 NOTE — ASSESSMENT & PLAN NOTE
#ESRD on HD MWF:  Dialysis unit/days: [facility]  Access: AV fistula   Had dialysis yesterday, well-tolerated, continue with HD as per schedule    Renal Diet  Fluid restriction 1.8 L /d  Adjust medications to GFR<10  Avoid opioids

## 2025-01-08 ENCOUNTER — TELEPHONE (OUTPATIENT)
Facility: CLINIC | Age: 40
End: 2025-01-08

## 2025-01-08 RX ORDER — OSELTAMIVIR PHOSPHATE 30 MG/1
30 CAPSULE ORAL
Qty: 2 CAPSULE | Refills: 0 | Status: SHIPPED | OUTPATIENT
Start: 2025-01-08 | End: 2025-01-13

## 2025-01-08 NOTE — TELEPHONE ENCOUNTER
I spoke with this Bill on 1/8 regarding D/C from hospital yesterday and flu positive, discussed holding off on therapy tomorrow (1/9) and returning when he is feeling better the following week.    Joe Hopper, ALEX, OTR/L  Physical Therapy at [facility]  [place] OT License# [id number]

## 2025-01-09 ENCOUNTER — APPOINTMENT (OUTPATIENT)
Facility: CLINIC | Age: 40
End: 2025-01-09
Payer: MEDICARE

## 2025-01-09 ENCOUNTER — HOSPITAL ENCOUNTER (INPATIENT)
Facility: HOSPITAL | Age: 40
LOS: 3 days | Discharge: HOME | End: 2025-01-13
Attending: EMERGENCY MEDICINE | Admitting: INTERNAL MEDICINE
Payer: MEDICARE

## 2025-01-09 ENCOUNTER — APPOINTMENT (EMERGENCY)
Dept: RADIOLOGY | Facility: HOSPITAL | Age: 40
End: 2025-01-09
Payer: MEDICARE

## 2025-01-09 ENCOUNTER — APPOINTMENT (EMERGENCY)
Dept: CT IMAGING | Facility: HOSPITAL | Age: 40
End: 2025-01-09
Payer: MEDICARE

## 2025-01-09 DIAGNOSIS — J10.1 INFLUENZA B: ICD-10-CM

## 2025-01-09 DIAGNOSIS — N18.6 ESRD (END STAGE RENAL DISEASE) (HCC): ICD-10-CM

## 2025-01-09 DIAGNOSIS — I16.1 HYPERTENSIVE EMERGENCY: Primary | ICD-10-CM

## 2025-01-09 DIAGNOSIS — E10.9 TYPE 1 DIABETES (HCC): ICD-10-CM

## 2025-01-09 DIAGNOSIS — I16.0 HYPERTENSIVE URGENCY: ICD-10-CM

## 2025-01-09 LAB
ALBUMIN SERPL BCG-MCNC: 4 G/DL (ref 3.5–5)
ALP SERPL-CCNC: 86 U/L (ref 34–104)
ALT SERPL W P-5'-P-CCNC: 11 U/L (ref 7–52)
ANION GAP SERPL CALCULATED.3IONS-SCNC: 9 MMOL/L (ref 4–13)
AST SERPL W P-5'-P-CCNC: 20 U/L (ref 13–39)
ATRIAL RATE: 66 BPM
BASOPHILS # BLD AUTO: 0.06 THOUSANDS/ΜL (ref 0–0.1)
BASOPHILS NFR BLD AUTO: 1 % (ref 0–1)
BILIRUB SERPL-MCNC: 0.53 MG/DL (ref 0.2–1)
BUN SERPL-MCNC: 19 MG/DL (ref 5–25)
CALCIUM SERPL-MCNC: 8.5 MG/DL (ref 8.4–10.2)
CARDIAC TROPONIN I PNL SERPL HS: 20 NG/L (ref ?–50)
CHLORIDE SERPL-SCNC: 97 MMOL/L (ref 96–108)
CO2 SERPL-SCNC: 34 MMOL/L (ref 21–32)
CREAT SERPL-MCNC: 9.37 MG/DL (ref 0.6–1.3)
EOSINOPHIL # BLD AUTO: 0.33 THOUSAND/ΜL (ref 0–0.61)
EOSINOPHIL NFR BLD AUTO: 6 % (ref 0–6)
ERYTHROCYTE [DISTWIDTH] IN BLOOD BY AUTOMATED COUNT: 16 % (ref 11.6–15.1)
GFR SERPL CREATININE-BSD FRML MDRD: 6 ML/MIN/1.73SQ M
GLUCOSE SERPL-MCNC: 190 MG/DL (ref 65–140)
HCT VFR BLD AUTO: 33.9 % (ref 36.5–49.3)
HGB BLD-MCNC: 11.1 G/DL (ref 12–17)
IMM GRANULOCYTES # BLD AUTO: 0.03 THOUSAND/UL (ref 0–0.2)
IMM GRANULOCYTES NFR BLD AUTO: 1 % (ref 0–2)
LYMPHOCYTES # BLD AUTO: 1.65 THOUSANDS/ΜL (ref 0.6–4.47)
LYMPHOCYTES NFR BLD AUTO: 30 % (ref 14–44)
MCH RBC QN AUTO: 33 PG (ref 26.8–34.3)
MCHC RBC AUTO-ENTMCNC: 32.7 G/DL (ref 31.4–37.4)
MCV RBC AUTO: 101 FL (ref 82–98)
MONOCYTES # BLD AUTO: 0.48 THOUSAND/ΜL (ref 0.17–1.22)
MONOCYTES NFR BLD AUTO: 9 % (ref 4–12)
NEUTROPHILS # BLD AUTO: 3.03 THOUSANDS/ΜL (ref 1.85–7.62)
NEUTS SEG NFR BLD AUTO: 53 % (ref 43–75)
NRBC BLD AUTO-RTO: 0 /100 WBCS
P AXIS: 77 DEGREES
PLATELET # BLD AUTO: 274 THOUSANDS/UL (ref 149–390)
PMV BLD AUTO: 10 FL (ref 8.9–12.7)
POTASSIUM SERPL-SCNC: 4.4 MMOL/L (ref 3.5–5.3)
PR INTERVAL: 184 MS
PROT SERPL-MCNC: 5.8 G/DL (ref 6.4–8.4)
QRS AXIS: 65 DEGREES
QRSD INTERVAL: 72 MS
QT INTERVAL: 466 MS
QTC INTERVAL: 488 MS
RBC # BLD AUTO: 3.36 MILLION/UL (ref 3.88–5.62)
SODIUM SERPL-SCNC: 140 MMOL/L (ref 135–147)
T WAVE AXIS: 35 DEGREES
VENTRICULAR RATE: 66 BPM
WBC # BLD AUTO: 5.58 THOUSAND/UL (ref 4.31–10.16)

## 2025-01-09 PROCEDURE — 99284 EMERGENCY DEPT VISIT MOD MDM: CPT

## 2025-01-09 PROCEDURE — 80053 COMPREHEN METABOLIC PANEL: CPT

## 2025-01-09 PROCEDURE — 70450 CT HEAD/BRAIN W/O DYE: CPT

## 2025-01-09 PROCEDURE — 99291 CRITICAL CARE FIRST HOUR: CPT | Performed by: EMERGENCY MEDICINE

## 2025-01-09 PROCEDURE — 36415 COLL VENOUS BLD VENIPUNCTURE: CPT

## 2025-01-09 PROCEDURE — 71046 X-RAY EXAM CHEST 2 VIEWS: CPT

## 2025-01-09 PROCEDURE — 85025 COMPLETE CBC W/AUTO DIFF WBC: CPT

## 2025-01-09 PROCEDURE — 84484 ASSAY OF TROPONIN QUANT: CPT

## 2025-01-09 PROCEDURE — 93005 ELECTROCARDIOGRAM TRACING: CPT

## 2025-01-09 PROCEDURE — 96365 THER/PROPH/DIAG IV INF INIT: CPT

## 2025-01-09 RX ADMIN — NICARDIPINE HYDROCHLORIDE 5 MG/HR: 2.5 INJECTION, SOLUTION INTRAVENOUS at 23:23

## 2025-01-10 ENCOUNTER — APPOINTMENT (OUTPATIENT)
Dept: DIALYSIS | Facility: HOSPITAL | Age: 40
End: 2025-01-10
Payer: MEDICARE

## 2025-01-10 PROBLEM — J11.1 INFLUENZA: Status: ACTIVE | Noted: 2025-01-04

## 2025-01-10 PROBLEM — D63.8 ANEMIA OF CHRONIC DISEASE: Status: ACTIVE | Noted: 2022-01-06

## 2025-01-10 LAB
2HR DELTA HS TROPONIN: -3 NG/L
ANION GAP SERPL CALCULATED.3IONS-SCNC: 8 MMOL/L (ref 4–13)
BUN SERPL-MCNC: 20 MG/DL (ref 5–25)
CALCIUM SERPL-MCNC: 8.3 MG/DL (ref 8.4–10.2)
CARDIAC TROPONIN I PNL SERPL HS: 17 NG/L (ref ?–50)
CHLORIDE SERPL-SCNC: 99 MMOL/L (ref 96–108)
CO2 SERPL-SCNC: 32 MMOL/L (ref 21–32)
CREAT SERPL-MCNC: 9.05 MG/DL (ref 0.6–1.3)
GFR SERPL CREATININE-BSD FRML MDRD: 6 ML/MIN/1.73SQ M
GLUCOSE SERPL-MCNC: 132 MG/DL (ref 65–140)
POTASSIUM SERPL-SCNC: 4.4 MMOL/L (ref 3.5–5.3)
SODIUM SERPL-SCNC: 139 MMOL/L (ref 135–147)

## 2025-01-10 PROCEDURE — 36415 COLL VENOUS BLD VENIPUNCTURE: CPT

## 2025-01-10 PROCEDURE — 99223 1ST HOSP IP/OBS HIGH 75: CPT | Performed by: INTERNAL MEDICINE

## 2025-01-10 PROCEDURE — 80048 BASIC METABOLIC PNL TOTAL CA: CPT

## 2025-01-10 PROCEDURE — 96375 TX/PRO/DX INJ NEW DRUG ADDON: CPT

## 2025-01-10 PROCEDURE — 99215 OFFICE O/P EST HI 40 MIN: CPT | Performed by: STUDENT IN AN ORGANIZED HEALTH CARE EDUCATION/TRAINING PROGRAM

## 2025-01-10 PROCEDURE — 84484 ASSAY OF TROPONIN QUANT: CPT

## 2025-01-10 PROCEDURE — G0257 UNSCHED DIALYSIS ESRD PT HOS: HCPCS

## 2025-01-10 RX ORDER — CLONIDINE HYDROCHLORIDE 0.1 MG/1
0.2 TABLET ORAL EVERY 12 HOURS SCHEDULED
Status: DISCONTINUED | OUTPATIENT
Start: 2025-01-10 | End: 2025-01-10

## 2025-01-10 RX ORDER — CALCIUM ACETATE 667 MG/1
667 CAPSULE ORAL 3 TIMES DAILY
Status: DISCONTINUED | OUTPATIENT
Start: 2025-01-10 | End: 2025-01-13 | Stop reason: HOSPADM

## 2025-01-10 RX ORDER — TRAZODONE HYDROCHLORIDE 50 MG/1
50 TABLET, FILM COATED ORAL
Status: DISCONTINUED | OUTPATIENT
Start: 2025-01-10 | End: 2025-01-13 | Stop reason: HOSPADM

## 2025-01-10 RX ORDER — ONDANSETRON 4 MG/1
4 TABLET, ORALLY DISINTEGRATING ORAL EVERY 6 HOURS PRN
Status: DISCONTINUED | OUTPATIENT
Start: 2025-01-10 | End: 2025-01-13 | Stop reason: HOSPADM

## 2025-01-10 RX ORDER — NIFEDIPINE 30 MG/1
90 TABLET, EXTENDED RELEASE ORAL 2 TIMES DAILY
Status: DISCONTINUED | OUTPATIENT
Start: 2025-01-11 | End: 2025-01-11

## 2025-01-10 RX ORDER — FAMOTIDINE 20 MG/1
10 TABLET, FILM COATED ORAL DAILY
Status: DISCONTINUED | OUTPATIENT
Start: 2025-01-10 | End: 2025-01-13 | Stop reason: HOSPADM

## 2025-01-10 RX ORDER — HYDROXYZINE HYDROCHLORIDE 10 MG/1
10 TABLET, FILM COATED ORAL EVERY 6 HOURS PRN
Status: DISCONTINUED | OUTPATIENT
Start: 2025-01-10 | End: 2025-01-13 | Stop reason: HOSPADM

## 2025-01-10 RX ORDER — ASPIRIN 81 MG/1
81 TABLET ORAL DAILY
Status: DISCONTINUED | OUTPATIENT
Start: 2025-01-10 | End: 2025-01-13 | Stop reason: HOSPADM

## 2025-01-10 RX ORDER — HYDRALAZINE HYDROCHLORIDE 20 MG/ML
10 INJECTION INTRAMUSCULAR; INTRAVENOUS ONCE
Status: COMPLETED | OUTPATIENT
Start: 2025-01-10 | End: 2025-01-10

## 2025-01-10 RX ORDER — ATORVASTATIN CALCIUM 40 MG/1
40 TABLET, FILM COATED ORAL EVERY EVENING
Status: DISCONTINUED | OUTPATIENT
Start: 2025-01-10 | End: 2025-01-13 | Stop reason: HOSPADM

## 2025-01-10 RX ORDER — NIFEDIPINE 30 MG/1
120 TABLET, EXTENDED RELEASE ORAL
Status: DISCONTINUED | OUTPATIENT
Start: 2025-01-10 | End: 2025-01-10

## 2025-01-10 RX ORDER — HYDRALAZINE HYDROCHLORIDE 20 MG/ML
20 INJECTION INTRAMUSCULAR; INTRAVENOUS ONCE
Status: DISCONTINUED | OUTPATIENT
Start: 2025-01-10 | End: 2025-01-10

## 2025-01-10 RX ORDER — GABAPENTIN 100 MG/1
100 CAPSULE ORAL DAILY
Status: DISCONTINUED | OUTPATIENT
Start: 2025-01-10 | End: 2025-01-13 | Stop reason: HOSPADM

## 2025-01-10 RX ORDER — CLONIDINE HYDROCHLORIDE 0.1 MG/1
0.2 TABLET ORAL EVERY 8 HOURS SCHEDULED
Status: DISCONTINUED | OUTPATIENT
Start: 2025-01-10 | End: 2025-01-13 | Stop reason: HOSPADM

## 2025-01-10 RX ORDER — HYDRALAZINE HYDROCHLORIDE 25 MG/1
100 TABLET, FILM COATED ORAL EVERY 8 HOURS SCHEDULED
Status: DISCONTINUED | OUTPATIENT
Start: 2025-01-10 | End: 2025-01-13 | Stop reason: HOSPADM

## 2025-01-10 RX ORDER — GABAPENTIN 100 MG/1
200 CAPSULE ORAL
Status: DISCONTINUED | OUTPATIENT
Start: 2025-01-10 | End: 2025-01-13 | Stop reason: HOSPADM

## 2025-01-10 RX ORDER — DOXAZOSIN 4 MG/1
4 TABLET ORAL DAILY
Status: DISCONTINUED | OUTPATIENT
Start: 2025-01-11 | End: 2025-01-13 | Stop reason: HOSPADM

## 2025-01-10 RX ORDER — ESCITALOPRAM OXALATE 20 MG/1
20 TABLET ORAL DAILY
Status: DISCONTINUED | OUTPATIENT
Start: 2025-01-10 | End: 2025-01-13 | Stop reason: HOSPADM

## 2025-01-10 RX ORDER — TORSEMIDE 100 MG/1
100 TABLET ORAL DAILY
Status: DISCONTINUED | OUTPATIENT
Start: 2025-01-11 | End: 2025-01-13 | Stop reason: HOSPADM

## 2025-01-10 RX ORDER — TORSEMIDE 100 MG/1
100 TABLET ORAL DAILY
Status: DISCONTINUED | OUTPATIENT
Start: 2025-01-10 | End: 2025-01-10

## 2025-01-10 RX ORDER — NIFEDIPINE 30 MG/1
90 TABLET, EXTENDED RELEASE ORAL 2 TIMES DAILY
Status: CANCELLED | OUTPATIENT
Start: 2025-01-10

## 2025-01-10 RX ORDER — CALCITRIOL 0.25 UG/1
0.75 CAPSULE, LIQUID FILLED ORAL 3 TIMES WEEKLY
Status: DISCONTINUED | OUTPATIENT
Start: 2025-01-10 | End: 2025-01-13 | Stop reason: HOSPADM

## 2025-01-10 RX ORDER — ENOXAPARIN SODIUM 100 MG/ML
40 INJECTION SUBCUTANEOUS DAILY
Status: DISCONTINUED | OUTPATIENT
Start: 2025-01-10 | End: 2025-01-10

## 2025-01-10 RX ORDER — NIFEDIPINE 30 MG/1
180 TABLET, EXTENDED RELEASE ORAL
Status: DISCONTINUED | OUTPATIENT
Start: 2025-01-10 | End: 2025-01-10

## 2025-01-10 RX ORDER — CINACALCET 30 MG/1
60 TABLET, FILM COATED ORAL DAILY
Status: DISCONTINUED | OUTPATIENT
Start: 2025-01-10 | End: 2025-01-13 | Stop reason: HOSPADM

## 2025-01-10 RX ORDER — LOSARTAN POTASSIUM 50 MG/1
100 TABLET ORAL
Status: DISCONTINUED | OUTPATIENT
Start: 2025-01-10 | End: 2025-01-13 | Stop reason: HOSPADM

## 2025-01-10 RX ORDER — OSELTAMIVIR PHOSPHATE 30 MG/1
30 CAPSULE ORAL
Status: DISPENSED | OUTPATIENT
Start: 2025-01-10 | End: 2025-01-11

## 2025-01-10 RX ORDER — DOXAZOSIN 1 MG/1
2 TABLET ORAL DAILY
Status: DISCONTINUED | OUTPATIENT
Start: 2025-01-10 | End: 2025-01-10

## 2025-01-10 RX ORDER — LABETALOL 100 MG/1
400 TABLET, FILM COATED ORAL 3 TIMES DAILY
Status: DISCONTINUED | OUTPATIENT
Start: 2025-01-10 | End: 2025-01-10

## 2025-01-10 RX ORDER — ASCORBIC ACID, THIAMINE MONONITRATE,RIBOFLAVIN, NIACINAMIDE, PYRIDOXINE HYDROCHLORIDE, FOLIC ACID, CYANOCOBALAMIN, BIOTIN, CALCIUM PANTOTHENATE, 100; 1.5; 1.7; 20; 10; 1; 6000; 150000; 5 MG/1; MG/1; MG/1; MG/1; MG/1; MG/1; UG/1; UG/1; MG/1
1 CAPSULE, LIQUID FILLED ORAL
Status: DISCONTINUED | OUTPATIENT
Start: 2025-01-10 | End: 2025-01-13 | Stop reason: HOSPADM

## 2025-01-10 RX ORDER — LABETALOL 100 MG/1
400 TABLET, FILM COATED ORAL 3 TIMES DAILY
Status: DISCONTINUED | OUTPATIENT
Start: 2025-01-10 | End: 2025-01-13 | Stop reason: HOSPADM

## 2025-01-10 RX ADMIN — LABETALOL HYDROCHLORIDE 400 MG: 200 TABLET, FILM COATED ORAL at 18:35

## 2025-01-10 RX ADMIN — CINACALCET 60 MG: 30 TABLET ORAL at 08:59

## 2025-01-10 RX ADMIN — CALCIUM ACETATE 667 MG: 667 CAPSULE ORAL at 17:02

## 2025-01-10 RX ADMIN — GABAPENTIN 200 MG: 100 CAPSULE ORAL at 21:06

## 2025-01-10 RX ADMIN — HYDRALAZINE HYDROCHLORIDE 100 MG: 25 TABLET ORAL at 21:06

## 2025-01-10 RX ADMIN — CALCIUM ACETATE 667 MG: 667 CAPSULE ORAL at 20:15

## 2025-01-10 RX ADMIN — CLONIDINE HYDROCHLORIDE 0.2 MG: 0.1 TABLET ORAL at 08:52

## 2025-01-10 RX ADMIN — OSELTAMIVIR PHOSPHATE 30 MG: 30 CAPSULE ORAL at 18:35

## 2025-01-10 RX ADMIN — ATORVASTATIN CALCIUM 40 MG: 40 TABLET, FILM COATED ORAL at 17:02

## 2025-01-10 RX ADMIN — ASPIRIN 81 MG: 81 TABLET, COATED ORAL at 08:53

## 2025-01-10 RX ADMIN — CLONIDINE HYDROCHLORIDE 0.2 MG: 0.1 TABLET ORAL at 13:46

## 2025-01-10 RX ADMIN — CALCIUM ACETATE 667 MG: 667 CAPSULE ORAL at 08:53

## 2025-01-10 RX ADMIN — GABAPENTIN 100 MG: 100 CAPSULE ORAL at 08:53

## 2025-01-10 RX ADMIN — CALCITRIOL 0.75 MCG: 0.25 CAPSULE, LIQUID FILLED ORAL at 08:59

## 2025-01-10 RX ADMIN — DOXAZOSIN 2 MG: 1 TABLET ORAL at 08:53

## 2025-01-10 RX ADMIN — TRAZODONE HYDROCHLORIDE 50 MG: 50 TABLET ORAL at 21:06

## 2025-01-10 RX ADMIN — HYDRALAZINE HYDROCHLORIDE 10 MG: 20 INJECTION INTRAMUSCULAR; INTRAVENOUS at 20:15

## 2025-01-10 RX ADMIN — LOSARTAN POTASSIUM 100 MG: 50 TABLET, FILM COATED ORAL at 21:06

## 2025-01-10 RX ADMIN — LABETALOL HYDROCHLORIDE 400 MG: 100 TABLET, FILM COATED ORAL at 08:52

## 2025-01-10 RX ADMIN — HYDRALAZINE HYDROCHLORIDE 100 MG: 25 TABLET ORAL at 06:02

## 2025-01-10 RX ADMIN — CLONIDINE HYDROCHLORIDE 0.2 MG: 0.1 TABLET ORAL at 21:06

## 2025-01-10 RX ADMIN — TORSEMIDE 100 MG: 100 TABLET ORAL at 08:53

## 2025-01-10 RX ADMIN — HYDRALAZINE HYDROCHLORIDE 100 MG: 25 TABLET ORAL at 13:18

## 2025-01-10 RX ADMIN — Medication 1 CAPSULE: at 13:24

## 2025-01-10 RX ADMIN — LABETALOL HYDROCHLORIDE 400 MG: 200 TABLET, FILM COATED ORAL at 13:19

## 2025-01-10 RX ADMIN — ESCITALOPRAM OXALATE 20 MG: 20 TABLET ORAL at 08:53

## 2025-01-10 RX ADMIN — FAMOTIDINE 10 MG: 20 TABLET, FILM COATED ORAL at 08:53

## 2025-01-10 RX ADMIN — HYDRALAZINE HYDROCHLORIDE 10 MG: 20 INJECTION, SOLUTION INTRAMUSCULAR; INTRAVENOUS at 02:35

## 2025-01-10 NOTE — ASSESSMENT & PLAN NOTE
Patient has history of hypertension with end-stage renal disease.  Has previously been worked up for secondary causes including pheochromocytoma, renal artery stenosis.  Started on Cardene drip but was weaned off and started on hydralazine 20 mg.  Recently discharged on 1/7 for similar presentation.  Reports compliance with medications as outpatient.  Presented with headache which has improved with blood pressure reduction.  Current blood pressure is at goal, 166/85.  Interestingly blood pressure was 122/70 during his office visit with cardiology on 12/26/2024.  Echo 11/6/2024 showed EF of 65% and G1 DD.     Plan:  Resume his home medications  Cardene 0.2 mg every 12 hours  Doxazosin 2 mg daily  Hydralazine 100 mg every 8 hours  Labetalol 400 mg 3 times daily  Losartan 100 mg daily  Nifedipine 120 mg daily  Continue to monitor blood pressure.  Encourage medication compliance.

## 2025-01-10 NOTE — H&P
H&P - Hospitalist   Name: Mateus Mckeon 39 y.o. male I MRN: [id number]  Unit/Bed#: [facility]-01 I Date of Admission: 1/9/2025   Date of Service: 1/10/2025 I Hospital Day: 0     Assessment & Plan  Hypertensive urgency  Patient has history of hypertension with end-stage renal disease.  Has previously been worked up for secondary causes including pheochromocytoma, renal artery stenosis.  Started on Cardene drip but was weaned off and started on hydralazine 20 mg.  Recently discharged on 1/7 for similar presentation.  Reports compliance with medications as outpatient.  Presented with headache which has improved with blood pressure reduction.  Current blood pressure is at goal, 166/85.  Interestingly blood pressure was 122/70 during his office visit with cardiology on 12/26/2024.  Echo 11/6/2024 showed EF of 65% and G1 DD.     Plan:  Resume his home medications  Cardene 0.2 mg every 12 hours  Doxazosin 2 mg daily  Hydralazine 100 mg every 8 hours  Labetalol 400 mg 3 times daily  Losartan 100 mg daily  Nifedipine 120 mg daily  Continue to monitor blood pressure.  Encourage medication compliance.  Type 1 diabetes ([facility])  Lab Results   Component Value Date    HGBA1C 6.9 (H) 11/05/2024       Recent Labs     01/07/25  0709 01/07/25  1106   POCGLU 214* 173*     On insulin pump  Continue to trend blood sugars.  Anemia of chronic disease  Recent Labs     01/07/25  0526 01/09/25  2259   HGB 10.6* 11.1*     Patient receives EPO with dialysis.  Secondary to ESRD.  ESRD (end stage renal disease) ([facility])  Lab Results   Component Value Date    EGFR 6 01/09/2025    EGFR 6 01/07/2025    EGFR 4 01/06/2025    CREATININE 9.37 (H) 01/09/2025    CREATININE 8.77 (H) 01/07/2025    CREATININE 12.10 (H) 01/06/2025     On hemodialysis M,W, F   He is on transplant list and following with [facility]  Nephrology consulted to facilitate dialysis.  Influenza  Requires 1 more dose of Tamiflu.  History of CVA (cerebrovascular accident)  He did have 3  previous CVAs, likely from uncontrolled BP.   2 ischemic strokes, 1 hemorrhagic.  SANJEEV showed no structural heart disease, no thrombus.   He had an implantable loop recorder in 2018 which did not show any atrial fibrillation.   Explanted in Sep 2023   Has baseline mild cognitive deficit.  Has residual right upper extremity weakness.  Thromboembolic disorder (HCC)  Prothrombin gene mutation  Patient's mother reports that he was on an antiplatelet prior but following with hematology and they recommended to stay only on baby aspirin       VTE Pharmacologic Prophylaxis: VTE Score: 2 Low Risk (Score 0-2) - Encourage Ambulation.  Code Status: Level 1 - Full Code   Discussion with family: Patient declined call to .     Anticipated Length of Stay: Patient will be admitted on an observation basis with an anticipated length of stay of less than 2 midnights secondary to HTN Urgency.    History of Present Illness   Chief Complaint: Hypertensive urgency    Mateus Mckeon is a 39 y.o. male with a PMH of prior CVA, hypertension, ESRD on dialysis, type 1 diabetes who presents with headache and blood pressure elevation with systolics in the 200s.  Patient was recently discharged on 9/7/2025 for similar presentation and was started on clonidine and torsemide. On my evaluation he was unsure of his most recent torsemide dose. He otherwise reported compliance with his other medications. He did note that he has difficulty remembering which medications he takes however his mother helps him. This afternoon, his blood pressure has been consistently above 200 systolic even with double dosing his labetalol and hydralazine.  Patient also was suffering from nausea which improved after taking 4 mg of Zofran.  Also reported a headache that has improved. The patient denies vision changes, chest pain, shortness of breath, numbness, new/worsening tingling, new/worsening weakness, difficulty ambulating, diarrhea or emesis.      Review of Systems   Constitutional:  Negative for chills and fever.   HENT:  Negative for ear pain and sore throat.    Eyes:  Negative for pain and visual disturbance.   Respiratory:  Negative for cough and shortness of breath.    Cardiovascular:  Negative for chest pain and palpitations.   Gastrointestinal:  Negative for abdominal pain and vomiting.   Genitourinary:  Negative for dysuria and hematuria.   Musculoskeletal:  Negative for arthralgias and back pain.   Skin:  Negative for color change and rash.   Neurological:  Positive for headaches. Negative for seizures and syncope.   All other systems reviewed and are negative.      Historical Information   Past Medical History:   Diagnosis Date    Acute kidney injury (HCC)     Ambulates with cane     Anuria     Anxiety     Cellulitis of right elbow 03/31/2021    Chronic kidney disease     COVID-19 10/18/2024    Depression     Diabetes mellitus (HCC)     Diarrhea     Emesis 10/24/2020    End stage renal disease (HCC) 02/11/2018    Formatting of this note might be different from the original. Last Assessment & Plan:  Secondary to DM.  On nightly PD.  Followed by Nephro.  [facility] Formatting of this note might be different from the original. Last Assessment & Plan:  Formatting of this note might be different from the original. 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    Esophagitis 07/21/2015    Falls     Gastroparesis     GERD (gastroesophageal reflux disease)     History of shingles 2010    History of transfusion 02/2018    no adverse reaction    Hyperlipidemia     Hyperphosphatemia     Hypertension     Hypoglycemia 07/15/2022    Itching     Mastoiditis of right side 07/15/2022    Muscle weakness     general unsteadiness    Obesity (BMI 30.0-34.9) 09/09/2019    Orthostatic hypotension 10/25/2020    Peripheral polyneuropathy 11/20/2019    PONV (postoperative nausea  and vomiting) 01/26/2018    Protein-calorie malnutrition (HCC) 11/23/2020    Recurrent peritonitis (HCC) due to peritoneal dialysis catheter 07/31/2020    Retinopathy     Seizures (HCC)     early 2020 - one time    Skin abnormality     some dime size areas where skin was scratched from itching    Sleep apnea     Spontaneous bacterial peritonitis (HCC) 10/19/2020    Squamous cell skin cancer     left temple    Stroke (HCC)     x2 - off balance/no driving/fatigue    Swelling of both lower extremities     Swelling of joint of upper arm, right 04/03/2024    Traumatic onycholysis 07/21/2022    Vomiting     Wears glasses     Word finding difficulty 11/05/2024     Past Surgical History:   Procedure Laterality Date    CARDIAC ELECTROPHYSIOLOGY PROCEDURE N/A 9/21/2023    Procedure: Cardiac loop recorder explant;  Surgeon: Parish Morgan MD;  Location: [facility];  Service: Cardiology    CARDIAC LOOP RECORDER  05/2018    COLONOSCOPY      EGD      EYE SURGERY Right     HEMODIALYSIS ADULT  11/6/2024    IR AV FISTULAGRAM/GRAFTOGRAM  02/23/2021    IR CEREBRAL ANGIOGRAPHY  1/12/2024    IR CEREBRAL ANGIOGRAPHY / INTERVENTION  1/5/2024    IR TUNNELED CENTRAL LINE PLACEMENT  02/16/2021    IR TUNNELED DIALYSIS CATHETER PLACEMENT  11/18/2020    IR TUNNELED DIALYSIS CATHETER REMOVAL  02/12/2021    IR TUNNELED DIALYSIS CATHETER REMOVAL  03/11/2021    MOHS SURGERY Left 12/14/2022    Left temple with Dr. Hassan    PERITONEAL CATHETER INSERTION N/A 08/27/2018    Procedure: UNROOF PD CATHETER;  Surgeon: Felipe Lindo DO;  Location: [facility];  Service: General    NC ARTERIOVENOUS ANASTOMOSIS OPEN DIRECT Left 11/09/2020    Procedure: CREATION FISTULA  ARTERIOVENOUS (AV) - LEFT WRIST;  Surgeon: Placido Altamirano MD;  Location: [facility];  Service: Vascular    NC ESOPHAGOGASTRODUODENOSCOPY TRANSORAL DIAGNOSTIC N/A 04/18/2019    Procedure: ESOPHAGOGASTRODUODENOSCOPY (EGD);  Surgeon: Ale Figueroa MD;  Location: [facility];  Service:  Gastroenterology    KY LAPS INSERTION TUNNELED INTRAPERITONEAL CATHETER N/A 2018    Procedure: LAPAROSCOPIC PD CATHETER PLACEMENT;  Surgeon: Felipe Lindo DO;  Location: [facility];  Service: General    KY REMOVAL TUNNELED INTRAPERITONEAL CATHETER N/A 2020    Procedure: REMOVAL CATHETER PERITONEAL DIALYSIS;  Surgeon: Abdifatah Ty MD;  Location: [facility];  Service: General    TONSILLECTOMY      UPPER GASTROINTESTINAL ENDOSCOPY       Social History     Tobacco Use    Smoking status: Former     Current packs/day: 0.00     Average packs/day: 0.5 packs/day for 12.0 years (6.0 ttl pk-yrs)     Types: Cigarettes     Start date: 2006     Quit date: 2018     Years since quittin.9    Smokeless tobacco: Never    Tobacco comments:     quit 2018   Vaping Use    Vaping status: Every Day    Substances: THC, CBD   Substance and Sexual Activity    Alcohol use: Not Currently    Drug use: Yes     Types: Marijuana     Comment: medical marijuana last vaped 2024    Sexual activity: Not Currently     E-Cigarette/Vaping    E-Cigarette Use Current Every Day User     Comments medical marijuana      E-Cigarette/Vaping Substances    Nicotine No     THC Yes     CBD Yes     Flavoring No     Other No     Unknown No        Social History:  Marital Status: Single   Patient Pre-hospital Living Situation: Home  Patient Pre-hospital Level of Mobility: walks  Patient Pre-hospital Diet Restrictions: None    Meds/Allergies   I have reviewed home medications with patient personally.  Prior to Admission medications    Medication Sig Start Date End Date Taking? Authorizing Provider   aspirin (ECOTRIN LOW STRENGTH) 81 mg EC tablet Take 81 mg by mouth daily    Historical Provider, MD   atorvastatin (LIPITOR) 40 mg tablet TAKE 1 TABLET BY MOUTH ONCE DAILY IN THE EVENING 9/10/24   Dania Sher DO   b complex vitamins capsule Take 1 capsule by mouth daily before lunch     Historical Provider, MD   calcitriol (ROCALTROL) 0.25  mcg capsule Take 3 capsules (0.75 mcg total) by mouth 3 (three) times a week  Patient not taking: Reported on 11/19/2024 11/11/24   Jose D Menendez MD   calcium acetate (PHOSLO) capsule Take 1 capsule (667 mg total) by mouth 3 (three) times a day 1/23/24   Court Edwards MD   cinacalcet (SENSIPAR) 60 MG tablet Take 1 tablet (60 mg total) by mouth daily 1/23/24   Court Edwards MD   cloNIDine (CATAPRES) 0.2 mg tablet Take 1 tablet (0.2 mg total) by mouth every 12 (twelve) hours 1/7/25   Stephany José MD   doxazosin (CARDURA) 2 mg tablet Take 1 tablet (2 mg total) by mouth daily 1/8/25   Stephany José MD   escitalopram (LEXAPRO) 20 mg tablet Take 1 tablet by mouth once daily 12/31/24   Pedrito Bermeo MD   famotidine (PEPCID) 10 mg tablet Take 1 tablet (10 mg total) by mouth daily 1/7/25 4/7/25  Stephany José MD   gabapentin (NEURONTIN) 100 mg capsule TAKE 1 CAPSULE BY MOUTH IN THE MORNING AND 2 AT BEDTIME 10/31/24   Dania Sher DO   GLUCAGON EMERGENCY 1 MG injection  7/24/19   Historical Provider, MD Jose Alfredo Stacy 1-Pack 1 MG/0.2ML SOAJ as needed 8/10/22   Historical Provider, MD   hydrALAZINE (APRESOLINE) 100 MG tablet Take 1 tablet (100 mg total) by mouth every 8 (eight) hours 1/7/25 4/7/25  Stephany José MD   hydrOXYzine HCL (ATARAX) 10 mg tablet Take 1 tablet (10 mg total) by mouth every 6 (six) hours as needed for itching or anxiety 12/7/22   Pedrito Bermeo MD   hyoscyamine (ANASPAZ,LEVSIN) 0.125 MG tablet Take 1 tablet (0.125 mg total) by mouth every 4 (four) hours as needed for cramping 12/10/24   Noah Fairchild MD   Insulin Disposable Pump (Omnipod 5 G6 Intro, Gen 5,) KIT  1/17/23   Historical Provider, MD   Insulin Disposable Pump (Omnipod 5 G6 Pod, Gen 5,) MISC  3/24/23   Historical Provider, MD   labetalol (NORMODYNE) 200 mg tablet Take 2 tablets (400 mg total) by mouth 3 (three) times a day 1/7/25   Stephany José MD   losartan (COZAAR) 100 MG tablet Take 1 tablet (100 mg total) by  mouth daily at bedtime 1/7/25   Stephany José MD   NIFEdipine (ADALAT CC) 60 MG 24 hr tablet Take 2 tablets (120 mg total) by mouth daily at bedtime 1/7/25   Stephany oJsé MD   NovoLOG 100 UNIT/ML injection  1/5/23   Historical Provider, MD   ondansetron (ZOFRAN) 4 mg tablet Take 1 tablet (4 mg total) by mouth every 8 (eight) hours as needed for nausea or vomiting 6/21/24   Dania Sher DO   oseltamivir (TAMIFLU) 30 MG capsule Take 1 capsule (30 mg total) by mouth Before Dialysis (To be taken after dialysis on 1/8 and 1/10) for up to 2 doses Take 1 capsule (30 mg total) by mouth AFTER Dialysis 1/8/25 1/11/25  Stephany José MD   Patiromer Sorbitex Calcium (VELTASSA PO) Take 5 g by mouth once a week    Historical Provider, MD   SPS 15 GM/60ML suspension TAKE 60 ML BY MOUTH SINGLE DOSE FOR EMERGENCY USE DURING MISSED HEMODIALYSIS 12/13/22   Historical Provider, MD   torsemide (DEMADEX) 100 mg tablet Take 1 tablet (100 mg total) by mouth daily Do not start before January 8, 2025. 1/8/25   Stephany José MD   traZODone (DESYREL) 50 mg tablet TAKE 1/2 (ONE-HALF) TABLET BY MOUTH ONCE DAILY AT BEDTIME AS NEEDED FOR SLEEP 12/4/24   Pedrito Bermeo MD     Allergies   Allergen Reactions    Sulfa Antibiotics Rash       Objective :  Temp:  [98.1 °F (36.7 °C)] 98.1 °F (36.7 °C)  HR:  [63-68] 66  BP: (146-245)/() 148/73  Resp:  [16-18] 16  SpO2:  [97 %-100 %] 100 %  O2 Device: None (Room air)    Physical Exam  Vitals and nursing note reviewed.   Constitutional:       General: He is not in acute distress.     Appearance: He is well-developed.   HENT:      Head: Normocephalic and atraumatic.   Eyes:      Conjunctiva/sclera: Conjunctivae normal.   Cardiovascular:      Rate and Rhythm: Normal rate and regular rhythm.      Heart sounds: No murmur heard.  Pulmonary:      Effort: Pulmonary effort is normal. No respiratory distress.      Breath sounds: Normal breath sounds.   Abdominal:      Palpations: Abdomen is soft.       Tenderness: There is no abdominal tenderness.   Musculoskeletal:         General: No swelling.   Skin:     General: Skin is warm and dry.      Capillary Refill: Capillary refill takes less than 2 seconds.   Neurological:      Mental Status: He is alert. Mental status is at baseline.   Psychiatric:         Mood and Affect: Mood normal.            Lab Results: I have reviewed the following results:  Results from last 7 days   Lab Units 01/09/25  2259   WBC Thousand/uL 5.58   HEMOGLOBIN g/dL 11.1*   HEMATOCRIT % 33.9*   PLATELETS Thousands/uL 274   SEGS PCT % 53   LYMPHO PCT % 30   MONO PCT % 9   EOS PCT % 6     Results from last 7 days   Lab Units 01/09/25  2259   SODIUM mmol/L 140   POTASSIUM mmol/L 4.4   CHLORIDE mmol/L 97   CO2 mmol/L 34*   BUN mg/dL 19   CREATININE mg/dL 9.37*   ANION GAP mmol/L 9   CALCIUM mg/dL 8.5   ALBUMIN g/dL 4.0   TOTAL BILIRUBIN mg/dL 0.53   ALK PHOS U/L 86   ALT U/L 11   AST U/L 20   GLUCOSE RANDOM mg/dL 190*     Results from last 7 days   Lab Units 01/05/25  0555   INR  1.01     Results from last 7 days   Lab Units 01/07/25  1106 01/07/25  0709 01/06/25  2059 01/06/25  1605 01/06/25  1139 01/06/25  0839 01/05/25  2134 01/05/25  1733 01/05/25  1143 01/05/25  0730   POC GLUCOSE mg/dl 173* 214* 214* 213* 144* 139 214* 190* 165* 154*     Lab Results   Component Value Date    HGBA1C 6.9 (H) 11/05/2024    HGBA1C 6.2 (H) 07/16/2024    HGBA1C 6.0 (H) 01/14/2024                 Administrative Statements       ** Please Note: This note has been constructed using a voice recognition system. **

## 2025-01-10 NOTE — PLAN OF CARE
Post-Dialysis RN Treatment Note    Blood Pressure:  Pre:  184/87 mm/Hg  Post:  178/92 mmHg   EDW: 89.4 kg    Weight:  Pre: 90.5 kg   Post:  88.0 kg   Mode of weight measurement: Standing Scale   Volume Removed:  2500 ml    Treatment duration: 210 minutes    NS given:  No    Treatment shortened No   Medications given during Rx: None Reported   Estimated Kt/V:  Not Applicable   Access type: AV fistula   Needle Gauge: 15 gauge   Access Issues: No    Report called to primary nurse:   Lakeshia Mallory    Problem: METABOLIC, FLUID AND ELECTROLYTES - ADULT  Goal: Electrolytes maintained within normal limits  Description: INTERVENTIONS:  - Monitor labs and assess patient for signs and symptoms of electrolyte imbalances  - Administer electrolyte replacement as ordered  - Monitor response to electrolyte replacements, including repeat lab results as appropriate  - Instruct patient on fluid and nutrition as appropriate  Outcome: Progressing  Goal: Fluid balance maintained  Description: INTERVENTIONS:  - Monitor labs   - Monitor I/O and WT  - Instruct patient on fluid and nutrition as appropriate  - Assess for signs & symptoms of volume excess or deficit  Outcome: Progressing

## 2025-01-10 NOTE — ASSESSMENT & PLAN NOTE
Lab Results   Component Value Date    EGFR 6 01/09/2025    EGFR 6 01/07/2025    EGFR 4 01/06/2025    CREATININE 9.37 (H) 01/09/2025    CREATININE 8.77 (H) 01/07/2025    CREATININE 12.10 (H) 01/06/2025     On hemodialysis M,W, F   He is on transplant list and following with [facility]  Nephrology consulted to facilitate dialysis.

## 2025-01-10 NOTE — ED PROCEDURE NOTE
PROCEDURE  CriticalCare Time    Date/Time: 1/9/2025 11:00 PM    Performed by: Guanaco Collins MD  Authorized by: Guanaco Collins MD    Critical care provider statement:     Critical care time (minutes):  35    Critical care time was exclusive of:  Separately billable procedures and treating other patients and teaching time    Critical care was necessary to treat or prevent imminent or life-threatening deterioration of the following conditions: hypertensive urgency.    Critical care was time spent personally by me on the following activities:  Obtaining history from patient or surrogate, development of treatment plan with patient or surrogate, evaluation of patient's response to treatment, examination of patient, ordering and performing treatments and interventions, ordering and review of laboratory studies, ordering and review of radiographic studies, re-evaluation of patient's condition and review of old charts    I assumed direction of critical care for this patient from another provider in my specialty: no         Guanaco Collins MD  01/10/25 0004

## 2025-01-10 NOTE — ASSESSMENT & PLAN NOTE
Current hemoglobin: 11.1 mg/dL  Treatment:  Outpatient Epogen  Transfuse for hemoglobin less than 7.0 per primary service

## 2025-01-10 NOTE — ASSESSMENT & PLAN NOTE
Recent Labs     01/07/25  0526 01/09/25  2259   HGB 10.6* 11.1*     Patient receives EPO with dialysis.  Secondary to ESRD.

## 2025-01-10 NOTE — CONSULTS
"NEPHROLOGY HOSPITAL CONSULTATION   Mateus Mkceon 39 y.o. male MRN: [id number]  Unit/Bed#: -01 Encounter: [id number]    Brief History of Admission - 39 y.o. man  with PMH of ESRD on [place] at [facility] [place], hypertension with multiple admissions with uncontrolled hypertension, fluid overload, CVA, ICH.  Patient recently discharge 24 to 48 hours ago p/w hypertension urgency. Nephrology is consulted for management of ESRD  Assessment & Plan  ESRD (end stage renal disease) ([facility])  Lab Results   Component Value Date    EGFR 6 01/09/2025    EGFR 6 01/07/2025    EGFR 4 01/06/2025    CREATININE 9.37 (H) 01/09/2025    CREATININE 8.77 (H) 01/07/2025    CREATININE 12.10 (H) 01/06/2025   #ESRD on HD MWF:  Dialysis unit/days: [facility]  Access: AV fistula  Plan for HD today with fluid removal  Renal Diet  Fluid restriction 1-1.5L/d  Adjust medications to GFR<10  Avoid opioids     Hypertensive urgency    Volume: Appears euvolemic  Blood pressure: Hypertensive, /76, hypertension urgency on admission with blood pressure of 240/112  UF on HD  Agree with increasing nifedipine to 90 mg twice a day  Recommend to increase clonidine to 0.2 3 times daily  Consider increasing doxazosin to 4 mg  Continue losartan 100  Labetalol 400 3 times daily  Torsemide 100  Type 1 diabetes ([facility])  Lab Results   Component Value Date    HGBA1C 6.9 (H) 11/05/2024       No results for input(s): \"POCGLU\" in the last 72 hours.    Blood Sugar Average: Last 72 hrs:      Anemia of chronic disease  Current hemoglobin: 11.1 mg/dL  Treatment:  Outpatient Epogen  Transfuse for hemoglobin less than 7.0 per primary service    Secondary hyperparathyroidism of renal origin ([facility])    Continue PhosLo 3 times daily with meals 3 tablets  Cinacalcet 60 mg daily      I have reviewed the nephrology recommendations including hemodialysis today, increase nifedipine and clonidine, with primary team, and we are in agreement with renal plan including the information outlined " above.    HISTORY OF PRESENT ILLNESS:  Requesting Physician: Ruiz Herring MD  Reason for Consult: Management of ESRD    Mateus Mckeon is a 39 y.o. male with PMH of ESRD on HD at [facility], hypertension with multiple admissions with uncontrolled hypertension, fluid overload, CVA, ICH.  Patient recently discharge 24 to 48 hours ago who was admitted to [facility] after presenting with hypertension urgency. A renal consultation is requested today for assistance in the management of ESRD.    PAST MEDICAL HISTORY:  Past Medical History:   Diagnosis Date    Acute kidney injury (HCC)     Ambulates with cane     Anuria     Anxiety     Cellulitis of right elbow 03/31/2021    Chronic kidney disease     COVID-19 10/18/2024    Depression     Diabetes mellitus (HCC)     Diarrhea     Emesis 10/24/2020    End stage renal disease (HCC) 02/11/2018    Formatting of this note might be different from the original. Last Assessment & Plan:  Secondary to DM.  On nightly PD.  Followed by Nephro.  Patient considering transplant for kidney and pancreas through [facility] Formatting of this note might be different from the original. Last Assessment & Plan:  Formatting of this note might be different from the original. 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    Esophagitis 07/21/2015    Falls     Gastroparesis     GERD (gastroesophageal reflux disease)     History of shingles 2010    History of transfusion 02/2018    no adverse reaction    Hyperlipidemia     Hyperphosphatemia     Hypertension     Hypoglycemia 07/15/2022    Itching     Mastoiditis of right side 07/15/2022    Muscle weakness     general unsteadiness    Obesity (BMI 30.0-34.9) 09/09/2019    Orthostatic hypotension 10/25/2020    Peripheral polyneuropathy 11/20/2019    PONV (postoperative nausea and vomiting) 01/26/2018    Protein-calorie malnutrition (HCC) 11/23/2020    Recurrent peritonitis (HCC) due to peritoneal dialysis  catheter 07/31/2020    Retinopathy     Seizures (HCC)     early 2020 - one time    Skin abnormality     some dime size areas where skin was scratched from itching    Sleep apnea     Spontaneous bacterial peritonitis (HCC) 10/19/2020    Squamous cell skin cancer     left temple    Stroke (HCC)     x2 - off balance/no driving/fatigue    Swelling of both lower extremities     Swelling of joint of upper arm, right 04/03/2024    Traumatic onycholysis 07/21/2022    Vomiting     Wears glasses     Word finding difficulty 11/05/2024       PAST SURGICAL HISTORY:  Past Surgical History:   Procedure Laterality Date    CARDIAC ELECTROPHYSIOLOGY PROCEDURE N/A 9/21/2023    Procedure: Cardiac loop recorder explant;  Surgeon: Parish Morgan MD;  Location: [facility];  Service: Cardiology    CARDIAC LOOP RECORDER  05/2018    COLONOSCOPY      EGD      EYE SURGERY Right     HEMODIALYSIS ADULT  11/6/2024    IR AV FISTULAGRAM/GRAFTOGRAM  02/23/2021    IR CEREBRAL ANGIOGRAPHY  1/12/2024    IR CEREBRAL ANGIOGRAPHY / INTERVENTION  1/5/2024    IR TUNNELED CENTRAL LINE PLACEMENT  02/16/2021    IR TUNNELED DIALYSIS CATHETER PLACEMENT  11/18/2020    IR TUNNELED DIALYSIS CATHETER REMOVAL  02/12/2021    IR TUNNELED DIALYSIS CATHETER REMOVAL  03/11/2021    MOHS SURGERY Left 12/14/2022    Left temple with Dr. Hassan    PERITONEAL CATHETER INSERTION N/A 08/27/2018    Procedure: UNROOF PD CATHETER;  Surgeon: Felipe Lindo DO;  Location: [facility];  Service: General    AZ ARTERIOVENOUS ANASTOMOSIS OPEN DIRECT Left 11/09/2020    Procedure: CREATION FISTULA  ARTERIOVENOUS (AV) - LEFT WRIST;  Surgeon: Placido Altamirano MD;  Location: [facility];  Service: Vascular    AZ ESOPHAGOGASTRODUODENOSCOPY TRANSORAL DIAGNOSTIC N/A 04/18/2019    Procedure: ESOPHAGOGASTRODUODENOSCOPY (EGD);  Surgeon: Ale Figueroa MD;  Location: [facility];  Service: Gastroenterology    AZ LAPS INSERTION TUNNELED INTRAPERITONEAL CATHETER N/A 08/06/2018    Procedure:  LAPAROSCOPIC PD CATHETER PLACEMENT;  Surgeon: Felipe Lindo DO;  Location: [facility];  Service: General    WV REMOVAL TUNNELED INTRAPERITONEAL CATHETER N/A 2020    Procedure: REMOVAL CATHETER PERITONEAL DIALYSIS;  Surgeon: Abdifatah Ty MD;  Location: [facility];  Service: General    TONSILLECTOMY      UPPER GASTROINTESTINAL ENDOSCOPY         ALLERGIES:  Allergies   Allergen Reactions    Sulfa Antibiotics Rash       SOCIAL HISTORY:  Social History     Substance and Sexual Activity   Alcohol Use Not Currently     Social History     Substance and Sexual Activity   Drug Use Yes    Types: Marijuana    Comment: medical marijuana last vaped 2024     Social History     Tobacco Use   Smoking Status Former    Current packs/day: 0.00    Average packs/day: 0.5 packs/day for 12.0 years (6.0 ttl pk-yrs)    Types: Cigarettes    Start date: 2006    Quit date: 2018    Years since quittin.9   Smokeless Tobacco Never   Tobacco Comments    quit 2018       FAMILY HISTORY:  Family History   Problem Relation Age of Onset    Breast cancer Mother     Hypertension Mother     Hyperlipidemia Father     Hypertension Father     Leukemia Maternal Grandmother     Hyperlipidemia Maternal Grandfather     Hypertension Maternal Grandfather     Hyperlipidemia Paternal Grandmother     Hypertension Paternal Grandmother     Heart disease Paternal Grandfather         cardiac disorder    Diabetes Paternal Grandfather        MEDICATIONS:    Current Facility-Administered Medications:     aspirin (ECOTRIN LOW STRENGTH) EC tablet 81 mg, 81 mg, Oral, Daily, Héctor Su MD, 81 mg at 01/10/25 0853    atorvastatin (LIPITOR) tablet 40 mg, 40 mg, Oral, QPM, Héctor Su MD    calcitriol (ROCALTROL) capsule 0.75 mcg, 0.75 mcg, Oral, Once per day on , Héctor Su MD, 0.75 mcg at 01/10/25 0859    calcium acetate (PHOSLO) capsule 667 mg, 667 mg, Oral, TID, Héctor Su MD, 667 mg at 01/10/25 0853    cinacalcet (SENSIPAR)  tablet 60 mg, 60 mg, Oral, Daily, Héctor Su MD, 60 mg at 01/10/25 0859    cloNIDine (CATAPRES) tablet 0.2 mg, 0.2 mg, Oral, Q12H HALIE, Héctor Su MD, 0.2 mg at 01/10/25 0852    doxazosin (CARDURA) tablet 2 mg, 2 mg, Oral, Daily, Héctor Su MD, 2 mg at 01/10/25 0853    escitalopram (LEXAPRO) tablet 20 mg, 20 mg, Oral, Daily, Héctor Su MD, 20 mg at 01/10/25 0853    famotidine (PEPCID) tablet 10 mg, 10 mg, Oral, Daily, Héctor Su MD, 10 mg at 01/10/25 0853    gabapentin (NEURONTIN) capsule 100 mg, 100 mg, Oral, Daily, 100 mg at 01/10/25 0853 **AND** gabapentin (NEURONTIN) capsule 200 mg, 200 mg, Oral, HS, Héctor Su MD    hydrALAZINE (APRESOLINE) tablet 100 mg, 100 mg, Oral, Q8H HALIE, Héctor Su MD, 100 mg at 01/10/25 0602    hydrOXYzine HCL (ATARAX) tablet 10 mg, 10 mg, Oral, Q6H PRN, Héctor Su MD    labetalol (NORMODYNE) tablet 400 mg, 400 mg, Oral, TID, Héctor Su MD, 400 mg at 01/10/25 0852    losartan (COZAAR) tablet 100 mg, 100 mg, Oral, HS, Héctor Su MD    NIFEdipine (PROCARDIA XL) 24 hr tablet 120 mg, 120 mg, Oral, HS, Héctor Su MD    ondansetron (ZOFRAN-ODT) dispersible tablet 4 mg, 4 mg, Oral, Q6H PRN, Héctor Su MD    oseltamivir (TAMIFLU) capsule 30 mg, 30 mg, Oral, After Dialysis, Héctor Su MD    torsemide (DEMADEX) tablet 100 mg, 100 mg, Oral, Daily, Agustina Jiménez DO, 100 mg at 01/10/25 0853    traZODone (DESYREL) tablet 50 mg, 50 mg, Oral, HS, Héctor Su MD    vit B complex-vit C-folic acid (Renal Caps) capsule 1 capsule, 1 capsule, Oral, Daily Before Lunch, Héctor Su MD    REVIEW OF SYSTEMS:  Constitutional: Negative for fatigue, anorexia, fever, chills, diaphoresis  HENT: Negative for postnasal drip  Eyes: Negative for visual disturbance.   Respiratory: Negative for cough, shortness of breath and wheezing.   Cardiovascular: Negative for chest pain, palpitations and leg swelling.   Gastrointestinal: Negative for abdominal pain, constipation, diarrhea, nausea and  vomiting.   Genitourinary: No dysuria, hematuria  Endocrine: Negative for polyuria.   Musculoskeletal: Negative for arthralgias, back pain and joint swelling.   Skin: Negative for rash.   Neurological: Negative for focal weakness, headaches, dizziness.  Hematological: Negative for easy bruising or bleeding.  Psychiatric/Behavioral: Negative for confusion and sleep disturbance.   All the systems were reviewed and were negative except as documented on the HPI.    PHYSICAL EXAM:  Current Weight: Weight - Scale: 88.4 kg (194 lb 14.2 oz)  First Weight: Weight - Scale: 88.4 kg (194 lb 14.2 oz)  Vitals:    01/10/25 0405 01/10/25 0600 01/10/25 0750 01/10/25 0900   BP:   (!) 181/79 158/76   BP Location:   Right arm Right arm   Pulse:   61 65   Resp:   20    Temp:   97.5 °F (36.4 °C)    TempSrc:   Oral    SpO2:   97%    Weight: 88.4 kg (194 lb 14.2 oz) 88.4 kg (194 lb 14.2 oz)         Intake/Output Summary (Last 24 hours) at 1/10/2025 1139  Last data filed at 1/10/2025 0900  Gross per 24 hour   Intake 240 ml   Output --   Net 240 ml     Physical Exam  General:  no acute distress at this time  Skin:  No acute rash  Eyes:  No scleral icterus and noninjected  ENT:  mucous membranes moist  Neck:  no carotid bruits  Chest:  Clear to auscultation percussion, good respiratory effort, no use of accessory respiratory muscles  CVS:  Regular rate and rhythm without rub   Abdomen:  soft and nontender   Extremities: no significant lower extremity edema  Neuro:  No gross focality  Psych:  Alert , cooperative   Dialysis access: AV fistula      Invasive Devices:      Lab Results:   Results from last 7 days   Lab Units 01/09/25  2259 01/07/25  0526 01/06/25  0446 01/05/25  0555 01/04/25  2324 01/04/25  1935   WBC Thousand/uL 5.58 4.56 5.07 4.80  --  5.30   HEMOGLOBIN g/dL 11.1* 10.6* 11.1* 10.9*  --  11.8*   HEMATOCRIT % 33.9* 33.0* 33.9* 33.3*  --  36.4*   PLATELETS Thousands/uL 274 282 243 211   < > 227   POTASSIUM mmol/L 4.4 4.2 4.4 4.4   "--  4.6   CHLORIDE mmol/L 97 102 100 101  --  98   CO2 mmol/L 34* 29 29 30  --  32   BUN mg/dL 19 21 32* 24  --  22   CREATININE mg/dL 9.37* 8.77* 12.10* 9.71*  --  9.02*   CALCIUM mg/dL 8.5 8.1* 8.5 8.5  --  9.1   MAGNESIUM mg/dL  --  2.3 2.4 2.4  --  2.5   PHOSPHORUS mg/dL  --  4.8*  --  4.8*  --   --    ALK PHOS U/L 86  --   --  75  --  95   ALT U/L 11  --   --  9  --  12   AST U/L 20  --   --  18  --  19    < > = values in this interval not displayed.     Other Studies:     Portions of the record may have been created with voice recognition software. Occasional wrong word or \"sound a like\" substitutions may have occurred due to the inherent limitations of voice recognition software. Read the chart carefully and recognize, using context, where substitutions have occurred.If you have any questions, please contact the dictating provider.    "

## 2025-01-10 NOTE — ED ATTENDING ATTESTATION
1/9/2025  I, Guanaco Collins MD, saw and evaluated the patient. I have discussed the patient with the resident/non-physician practitioner and agree with the resident's/non-physician practitioner's findings, Plan of Care, and MDM as documented in the resident's/non-physician practitioner's note, except where noted. All available labs and Radiology studies were reviewed.  I was present for key portions of any procedure(s) performed by the resident/non-physician practitioner and I was immediately available to provide assistance.       At this point I agree with the current assessment done in the Emergency Department.  I have conducted an independent evaluation of this patient a history and physical is as follows: Patient is a 39 year old male with elevated blood pressure today despite taking his multiple BP medications. (+) headache. (+) nausea. (+) diarrhea. No vomiting. No chest pain or sob. No fever. No cough. Was diagnosed with influenza 1 week ago.  Last dialysis was yesterday. Has had prior ischemic and hemorrhagic stroke in the past with residual right sided weakness. Was last seen at [facility] on 1/7/25 for transitional care for hypertensive urgency. [facility] website checked on this patient and no Rx found. Patient needed our urgent attention. NCAT. PERRL. No scleral icterus. Mask in place. Lungs clear. Heart regular. Abdomen soft and nontender. Good bowel sounds. No edema. No rash noted. Slight weakness R UE. Differential diagnosis including but not limited to: hypertension, hypertensive urgency, hypertensive crisis, malignant hypertension, end organ damage, renal failure, metabolic abnormality, intracranial process, ACS, MI; doubt pheochromocytoma. Will check EKG, labs, CXR and CT head and give IV cardene. Will need admission.     ED Course         Critical Care Time  Procedures

## 2025-01-10 NOTE — ASSESSMENT & PLAN NOTE
Volume: Appears euvolemic  Blood pressure: Hypertensive, /76, hypertension urgency on admission with blood pressure of 240/112  UF on HD  Agree with increasing nifedipine to 90 mg twice a day  Recommend to increase clonidine to 0.2 3 times daily  Consider increasing doxazosin to 4 mg  Continue losartan 100  Labetalol 400 3 times daily  Torsemide 100

## 2025-01-10 NOTE — ASSESSMENT & PLAN NOTE
Lab Results   Component Value Date    HGBA1C 6.9 (H) 11/05/2024       Recent Labs     01/07/25  0709 01/07/25  1106   POCGLU 214* 173*     On insulin pump  Continue to trend blood sugars.

## 2025-01-10 NOTE — ASSESSMENT & PLAN NOTE
"Lab Results   Component Value Date    HGBA1C 6.9 (H) 11/05/2024       No results for input(s): \"POCGLU\" in the last 72 hours.    Blood Sugar Average: Last 72 hrs:      "

## 2025-01-10 NOTE — ASSESSMENT & PLAN NOTE
Lab Results   Component Value Date    EGFR 6 01/09/2025    EGFR 6 01/07/2025    EGFR 4 01/06/2025    CREATININE 9.37 (H) 01/09/2025    CREATININE 8.77 (H) 01/07/2025    CREATININE 12.10 (H) 01/06/2025   #ESRD on HD MWF:  Dialysis unit/days: [facility]  Access: AV fistula  Plan for HD today with fluid removal  Renal Diet  Fluid restriction 1-1.5L/d  Adjust medications to GFR<10  Avoid opioids

## 2025-01-10 NOTE — ASSESSMENT & PLAN NOTE
He did have 3 previous CVAs, likely from uncontrolled BP.   2 ischemic strokes, 1 hemorrhagic.  SANJEEV showed no structural heart disease, no thrombus.   He had an implantable loop recorder in 2018 which did not show any atrial fibrillation.   Explanted in Sep 2023   Has baseline mild cognitive deficit.  Has residual right upper extremity weakness.

## 2025-01-10 NOTE — ED PROVIDER NOTES
Time reflects when diagnosis was documented in both MDM as applicable and the Disposition within this note       Time User Action Codes Description Comment    1/10/2025  2:49 AM Dru Hinds Add [I16.1] Hypertensive emergency     1/10/2025  2:49 AM Dru Hinds Add [J10.1] Influenza B     1/10/2025  3:02 AM DennisGuanaco marin Add [I16.0] Hypertensive urgency     1/10/2025  3:02 AM DennisGuanaco Remove [I16.0] Hypertensive urgency     1/10/2025  4:11 AM Georges Héctor Add [N18.6] ESRD (end stage renal disease) ([facility])           ED Disposition       ED Disposition   Admit    Condition   Stable    Date/Time   Fri Eliezer 10, 2025  3:03 AM    Comment   Case was discussed with [facility] resident and the patient's admission status was agreed to be Admission Status: observation status to the service of Dr. Herring .               Assessment & Plan       Medical Decision Making  Amount and/or Complexity of Data Reviewed  Labs: ordered.  Radiology: ordered and independent interpretation performed.    Risk  Prescription drug management.  Decision regarding hospitalization.    39-year-old male with PMH 3x ischemic strokes, 1 hemorrhagic stroke, ESRD (on dialysis) who presents due to uncontrolled hypertension, malaise and nausea.  Initial presentation is concerning for hypertensive crisis and influenza, but other differentials include but are not limited to hypertensive urgency, malignant hypertension, end organ damage, renal failure, metabolic abnormality, intracranial process, ACS, MI; doubt pheochromocytoma.     Labs and results: Initial troponin of 20, delta of -3.  CT head: No acute intracranial abnormality.  CBC shows anemia to 11.1 hemoglobin.  CMP shows increased CO2 34, increased creatinine to 9.37 and decreased protein to 5.8.  EKG shows normal sinus rhythm with no acute ischemic change.    With patient doubling his home dosage of hydralazine and labetalol as well as his baseline regimen of 0.2 mg clonidine, 50 mg  hydralazine, 1200 mg labetalol and nifedipine 60 mg, we placed the patient directly on a Cardene drip starting at 5 mg/h.  This controlled the patient to a MAP of 97, which we then discontinued this drip.  Critical care was consulted who recommended placing the patient on hydralazine 20 mg and admitting to medicine.  After patient's MAP mazin above 137, 10 mg hydralazine was given which showed the patient's hypertension.    CT head shows no acute intracranial abnormality.  Chest x-ray shows no acute cardiopulmonary disease on my wet interpretation.    [facility] was consulted who agreed to accept the patient for further evaluation and management.    ED Course as of 01/10/25 0802   Fri Eliezer 10, 2025   0021 After critical care evaluated the patient, they recommended holding the Cardene drip and trialing 20 mg of hydralazine for control of his blood pressure.       Medications   hydrALAZINE (APRESOLINE) injection 10 mg (10 mg Intravenous Given 1/10/25 0235)       ED Risk Strat Scores   HEART Risk Score      Flowsheet Row Most Recent Value   Heart Score Risk Calculator    History 1 Filed at: 01/10/2025 0754   ECG 0 Filed at: 01/10/2025 0754   Age 0 Filed at: 01/10/2025 0754   Risk Factors 2 Filed at: 01/10/2025 0754   Troponin 1 Filed at: 01/10/2025 0754   HEART Score 4 Filed at: 01/10/2025 0754          HEART Risk Score      Flowsheet Row Most Recent Value   Heart Score Risk Calculator    History 1 Filed at: 01/10/2025 0754   ECG 0 Filed at: 01/10/2025 0754   Age 0 Filed at: 01/10/2025 0754   Risk Factors 2 Filed at: 01/10/2025 0754   Troponin 1 Filed at: 01/10/2025 0754   HEART Score 4 Filed at: 01/10/2025 0754                            SBIRT 22yo+      Flowsheet Row Most Recent Value   Initial Alcohol Screen: US AUDIT-C     1. How often do you have a drink containing alcohol? 0 Filed at: 01/09/2025 2252   2. How many drinks containing alcohol do you have on a typical day you are drinking?  0 Filed at: 01/09/2025 2252   3a.  Male UNDER 65: How often do you have five or more drinks on one occasion? 0 Filed at: 01/09/2025 2252   3b. FEMALE Any Age, or MALE 65+: How often do you have 4 or more drinks on one occassion? 0 Filed at: 01/09/2025 2252   Audit-C Score 0 Filed at: 01/09/2025 2252   ALETHEA: How many times in the past year have you...    Used an illegal drug or used a prescription medication for non-medical reasons? Never Filed at: 01/09/2025 2252                            History of Present Illness       Chief Complaint   Patient presents with    Hypertension     Pt reports HTN today that has not been controlled with medications. Reports headache starting around 2200. Took 100mg hydralazine, 400mg labetolol, 100mg losartan, and 120mg nifedipine around 2100       Past Medical History:   Diagnosis Date    Acute kidney injury (HCC)     Ambulates with cane     Anuria     Anxiety     Cellulitis of right elbow 03/31/2021    Chronic kidney disease     COVID-19 10/18/2024    Depression     Diabetes mellitus (HCC)     Diarrhea     Emesis 10/24/2020    End stage renal disease (HCC) 02/11/2018    Formatting of this note might be different from the original. Last Assessment & Plan:  Secondary to DM.  On nightly PD.  Followed by [facility].  [facility] Formatting of this note might be different from the original. Last Assessment & Plan:  Formatting of this note might be different from the original. 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    Esophagitis 07/21/2015    Falls     Gastroparesis     GERD (gastroesophageal reflux disease)     History of shingles 2010    History of transfusion 02/2018    no adverse reaction    Hyperlipidemia     Hyperphosphatemia     Hypertension     Hypoglycemia 07/15/2022    Itching     Mastoiditis of right side 07/15/2022    Muscle weakness     general unsteadiness    Obesity (BMI 30.0-34.9) 09/09/2019    Orthostatic  hypotension 10/25/2020    Peripheral polyneuropathy 11/20/2019    PONV (postoperative nausea and vomiting) 01/26/2018    Protein-calorie malnutrition (HCC) 11/23/2020    Recurrent peritonitis (HCC) due to peritoneal dialysis catheter 07/31/2020    Retinopathy     Seizures (HCC)     early 2020 - one time    Skin abnormality     some dime size areas where skin was scratched from itching    Sleep apnea     Spontaneous bacterial peritonitis (HCC) 10/19/2020    Squamous cell skin cancer     left temple    Stroke (HCC)     x2 - off balance/no driving/fatigue    Swelling of both lower extremities     Swelling of joint of upper arm, right 04/03/2024    Traumatic onycholysis 07/21/2022    Vomiting     Wears glasses     Word finding difficulty 11/05/2024      Past Surgical History:   Procedure Laterality Date    CARDIAC ELECTROPHYSIOLOGY PROCEDURE N/A 9/21/2023    Procedure: Cardiac loop recorder explant;  Surgeon: Parish Morgan MD;  Location: [facility];  Service: Cardiology    CARDIAC LOOP RECORDER  05/2018    COLONOSCOPY      EGD      EYE SURGERY Right     HEMODIALYSIS ADULT  11/6/2024    IR AV FISTULAGRAM/GRAFTOGRAM  02/23/2021    IR CEREBRAL ANGIOGRAPHY  1/12/2024    IR CEREBRAL ANGIOGRAPHY / INTERVENTION  1/5/2024    IR TUNNELED CENTRAL LINE PLACEMENT  02/16/2021    IR TUNNELED DIALYSIS CATHETER PLACEMENT  11/18/2020    IR TUNNELED DIALYSIS CATHETER REMOVAL  02/12/2021    IR TUNNELED DIALYSIS CATHETER REMOVAL  03/11/2021    MOHS SURGERY Left 12/14/2022    Left temple with Dr. Hassan    PERITONEAL CATHETER INSERTION N/A 08/27/2018    Procedure: UNROOF PD CATHETER;  Surgeon: Felipe Lindo DO;  Location: [facility];  Service: General    DC ARTERIOVENOUS ANASTOMOSIS OPEN DIRECT Left 11/09/2020    Procedure: CREATION FISTULA  ARTERIOVENOUS (AV) - LEFT WRIST;  Surgeon: Placido Altamirano MD;  Location: [facility];  Service: Vascular    DC ESOPHAGOGASTRODUODENOSCOPY TRANSORAL DIAGNOSTIC N/A 04/18/2019    Procedure:  ESOPHAGOGASTRODUODENOSCOPY (EGD);  Surgeon: Ale Figueroa MD;  Location: [facility];  Service: Gastroenterology    NC LAPS INSERTION TUNNELED INTRAPERITONEAL CATHETER N/A 2018    Procedure: LAPAROSCOPIC PD CATHETER PLACEMENT;  Surgeon: Felipe Lindo DO;  Location: [facility];  Service: General    NC REMOVAL TUNNELED INTRAPERITONEAL CATHETER N/A 2020    Procedure: REMOVAL CATHETER PERITONEAL DIALYSIS;  Surgeon: Abdifatah Ty MD;  Location: [facility];  Service: General    TONSILLECTOMY      UPPER GASTROINTESTINAL ENDOSCOPY        Family History   Problem Relation Age of Onset    Breast cancer Mother     Hypertension Mother     Hyperlipidemia Father     Hypertension Father     Leukemia Maternal Grandmother     Hyperlipidemia Maternal Grandfather     Hypertension Maternal Grandfather     Hyperlipidemia Paternal Grandmother     Hypertension Paternal Grandmother     Heart disease Paternal Grandfather         cardiac disorder    Diabetes Paternal Grandfather       Social History     Tobacco Use    Smoking status: Former     Current packs/day: 0.00     Average packs/day: 0.5 packs/day for 12.0 years (6.0 ttl pk-yrs)     Types: Cigarettes     Start date: 2006     Quit date: 2018     Years since quittin.9    Smokeless tobacco: Never    Tobacco comments:     quit 2018   Vaping Use    Vaping status: Every Day    Substances: THC, CBD   Substance Use Topics    Alcohol use: Not Currently    Drug use: Yes     Types: Marijuana     Comment: medical marijuana last vaped 2024      E-Cigarette/Vaping    E-Cigarette Use Current Every Day User     Comments medical marijuana       E-Cigarette/Vaping Substances    Nicotine No     THC Yes     CBD Yes     Flavoring No     Other No     Unknown No       I have reviewed and agree with the history as documented.     HPI  39-year-old male with PMH 3x ischemic strokes, 1 hemorrhagic stroke, ESRD (on dialysis) who presents due to uncontrolled hypertension, malaise  and nausea.  The patient states that he was recently hospitalized 5 days ago and discharged 2 days ago due to influenza B and hypertensive emergency.  He states that he was discharged on an updated hypertensive medication regimen and was below 160 systolic.  He reports that he has been suffering from increased malaise and nausea today.  He reports that he noticed his blood pressure with a systolic above 200 this morning, but then his blood pressure decreased after his morning medications.  This afternoon, his blood pressure has been consistently above 200 systolic even with double dosing his labetalol and hydralazine.  Patient also was suffering from nausea which improved after taking 4 mg of Zofran. The patient denies vision changes, chest pain, shortness of breath, numbness, new/worsening tingling, new/worsening weakness, difficulty ambulating, diarrhea or emesis.    Review of Systems   Constitutional:  Positive for appetite change, chills and fatigue. Negative for fever.   HENT:  Negative for ear pain and sore throat.    Eyes:  Negative for pain and visual disturbance.   Respiratory:  Negative for cough and shortness of breath.    Cardiovascular:  Negative for chest pain and palpitations.   Gastrointestinal:  Positive for nausea. Negative for abdominal pain, diarrhea and vomiting.   Genitourinary:  Negative for dysuria and hematuria.   Musculoskeletal:  Negative for arthralgias and back pain.   Skin:  Negative for color change and rash.   Neurological:  Negative for seizures and syncope.   All other systems reviewed and are negative.          Objective       ED Triage Vitals   Temperature Pulse Blood Pressure Respirations SpO2 Patient Position - Orthostatic VS   01/09/25 2249 01/09/25 2226 01/09/25 2226 01/09/25 2226 01/09/25 2226 01/09/25 2230   98.1 °F (36.7 °C) 66 (S) (!) 243/109 18 100 % Sitting      Temp Source Heart Rate Source BP Location FiO2 (%) Pain Score    01/09/25 2249 01/09/25 2226 01/09/25 2226 --  01/09/25 2249    Oral Monitor Right arm  6      Vitals      Date and Time Temp Pulse SpO2 Resp BP Pain Score FACES Pain Rating User   01/10/25 0750 97.5 °F (36.4 °C) 61 97 % 20 181/79 -- -- NK   01/10/25 0330 -- 66 100 % 16 148/73 -- -- RJK   01/10/25 0315 -- 67 100 % 16 151/72 -- -- RJK   01/10/25 0235 -- -- -- -- 182/86 -- -- RL   01/10/25 0215 -- 63 98 % -- 173/87 -- -- RL   01/10/25 0209 -- -- -- -- -- 2 -- RL   01/10/25 0200 -- 63 98 % -- 180/85 -- -- RL   01/10/25 0145 -- 65 99 % -- 198/89 -- -- RL   01/10/25 0130 -- 63 98 % -- 177/85 -- -- RL   01/10/25 0115 -- 66 98 % -- 173/79 -- -- RL   01/10/25 0100 -- 67 98 % -- 175/86 -- -- RL   01/10/25 0045 -- 67 98 % -- 169/83 -- -- RL   01/10/25 0030 -- 66 98 % -- 154/79 -- -- RL   01/10/25 0023 -- 67 -- -- 150/77 -- -- RL   01/10/25 0015 -- 66 100 % 16 152/75 -- -- RL   01/10/25 0001 -- -- -- -- -- 1 -- RL   01/10/25 0000 -- 68 -- -- 146/73 -- -- RL   01/09/25 2353 -- 68 -- -- 148/73 -- -- RL   01/09/25 2338 -- 68 -- -- 213/92 -- -- RL   01/09/25 2327 -- -- -- -- -- 3 -- RL   01/09/25 2323 -- 67 -- -- 240/112 -- -- RL   01/09/25 2249 98.1 °F (36.7 °C) -- -- -- -- 6 -- RL   01/09/25 2230 -- 65 97 % -- 245/116 -- -- RL   01/09/25 2226 -- 66 100 % 18  243/109 -- -- MOE            Physical Exam  Vitals and nursing note reviewed.   Constitutional:       General: He is in acute distress.      Appearance: He is well-developed. He is ill-appearing.   HENT:      Head: Normocephalic and atraumatic.   Eyes:      Conjunctiva/sclera: Conjunctivae normal.   Cardiovascular:      Rate and Rhythm: Normal rate and regular rhythm.      Heart sounds: No murmur heard.  Pulmonary:      Effort: Pulmonary effort is normal. No respiratory distress.      Breath sounds: Rales present. No rhonchi.   Abdominal:      Palpations: Abdomen is soft.      Tenderness: There is no abdominal tenderness.   Musculoskeletal:         General: No swelling.      Cervical back: Neck supple.   Skin:      General: Skin is warm and dry.      Capillary Refill: Capillary refill takes less than 2 seconds.   Neurological:      Mental Status: He is alert and oriented to person, place, and time. Mental status is at baseline.      Cranial Nerves: Cranial nerves 2-12 are intact.      Sensory: Sensation is intact.      Motor: Motor function is intact.      Coordination: Coordination is intact. Romberg sign negative. Coordination normal. Finger-Nose-Finger Test and Heel to Shin Test normal.      Comments: 4 out of 5 strength on the right which patient states is baseline.   Psychiatric:         Mood and Affect: Mood normal.         Results Reviewed       Procedure Component Value Units Date/Time    HS Troponin I 2hr [[id number]]  (Normal) Collected: 01/10/25 0102    Lab Status: Final result Specimen: Blood from Arm, Right Updated: 01/10/25 0202     hs TnI 2hr 17 ng/L      Delta 2hr hsTnI -3 ng/L     HS Troponin 0hr (reflex protocol) [[id number]]  (Normal) Collected: 01/09/25 2259    Lab Status: Final result Specimen: Blood from Arm, Right Updated: 01/09/25 2350     hs TnI 0hr 20 ng/L     Comprehensive metabolic panel [[id number]]  (Abnormal) Collected: 01/09/25 2259    Lab Status: Final result Specimen: Blood from Arm, Right Updated: 01/09/25 2345     Sodium 140 mmol/L      Potassium 4.4 mmol/L      Chloride 97 mmol/L      CO2 34 mmol/L      ANION GAP 9 mmol/L      BUN 19 mg/dL      Creatinine 9.37 mg/dL      Glucose 190 mg/dL      Calcium 8.5 mg/dL      AST 20 U/L      ALT 11 U/L      Alkaline Phosphatase 86 U/L      Total Protein 5.8 g/dL      Albumin 4.0 g/dL      Total Bilirubin 0.53 mg/dL      eGFR 6 ml/min/1.73sq m     Narrative:      National Kid[employer] Disease Foundation guidelines for Chronic Kidney Disease (CKD):     Stage 1 with normal or high GFR (GFR > 90 mL/min/1.73 square meters)    Stage 2 Mild CKD (GFR = 60-89 mL/min/1.73 square meters)    Stage 3A Moderate CKD (GFR = 45-59 mL/min/1.73 square meters)    Stage 3B  Moderate CKD (GFR = 30-44 mL/min/1.73 square meters)    Stage 4 Severe CKD (GFR = 15-29 mL/min/1.73 square meters)    Stage 5 End Stage CKD (GFR <15 mL/min/1.73 square meters)  Note: GFR calculation is accurate only with a steady state creatinine    CBC and differential [[id number]]  (Abnormal) Collected: 01/09/25 2259    Lab Status: Final result Specimen: Blood from Arm, Right Updated: 01/09/25 2332     WBC 5.58 Thousand/uL      RBC 3.36 Million/uL      Hemoglobin 11.1 g/dL      Hematocrit 33.9 %       fL      MCH 33.0 pg      MCHC 32.7 g/dL      RDW 16.0 %      MPV 10.0 fL      Platelets 274 Thousands/uL      nRBC 0 /100 WBCs      Segmented % 53 %      Immature Grans % 1 %      Lymphocytes % 30 %      Monocytes % 9 %      Eosinophils Relative 6 %      Basophils Relative 1 %      Absolute Neutrophils 3.03 Thousands/µL      Absolute Immature Grans 0.03 Thousand/uL      Absolute Lymphocytes 1.65 Thousands/µL      Absolute Monocytes 0.48 Thousand/µL      Eosinophils Absolute 0.33 Thousand/µL      Basophils Absolute 0.06 Thousands/µL             XR chest 2 views   ED Interpretation by Dru Hinds DO (01/10 2741)   On my wet read, no acute cardiopulmonary disease.      CT head without contrast   Final Interpretation by Sandra Crowell MD (01/09 4427)      No acute intracranial abnormality.                  Workstation performed: [id number]             ECG 12 Lead Documentation Only    Date/Time: 1/10/2025 7:57 AM    Performed by: Dru Hinds DO  Authorized by: Dru Hinds DO    Patient location:  ED  Previous ECG:     Previous ECG:  Compared to current    Similarity:  No change  Interpretation:     Interpretation: normal    Rate:     ECG rate:  66    ECG rate assessment: normal    Rhythm:     Rhythm: sinus rhythm    Ectopy:     Ectopy: none    QRS:     QRS axis:  Normal    QRS intervals:  Normal  Conduction:     Conduction: normal    ST segments:     ST segments:  Normal  T waves:     T waves: normal        ED  Medication and Procedure Management   Prior to Admission Medications   Prescriptions Last Dose Informant Patient Reported? Taking?   GLUCAGON EMERGENCY 1 MG injection Unknown Self, Family Member Yes No   Gvoke HypoPen 1-Pack 1 MG/0.2ML SOAJ Unknown Self, Family Member Yes No   Sig: as needed   Insulin Disposable Pump (Omnipod 5 G6 Intro, Gen 5,) KIT Unknown Self, Family Member Yes No   Insulin Disposable Pump (Omnipod 5 G6 Pod, Gen 5,) MISC Unknown Self, Family Member Yes No   NIFEdipine (ADALAT CC) 60 MG 24 hr tablet 1/9/2025  No Yes   Sig: Take 2 tablets (120 mg total) by mouth daily at bedtime   NovoLOG 100 UNIT/ML injection Not Taking Self, Family Member Yes No   Patient not taking: Reported on 11/19/2024   Patiromer Sorbitex Calcium (VELTASSA PO) 1/9/2025 Self, Family Member Yes Yes   Sig: Take 5 g by mouth once a week   SPS 15 GM/60ML suspension Unknown Self, Family Member Yes No   Sig: TAKE 60 ML BY MOUTH SINGLE DOSE FOR EMERGENCY USE DURING MISSED HEMODIALYSIS   aspirin (ECOTRIN LOW STRENGTH) 81 mg EC tablet 1/9/2025 Self, Family Member Yes Yes   Sig: Take 81 mg by mouth daily   atorvastatin (LIPITOR) 40 mg tablet 1/9/2025 Self, Family Member No Yes   Sig: TAKE 1 TABLET BY MOUTH ONCE DAILY IN THE EVENING   b complex vitamins capsule Past Week Self, Family Member Yes Yes   Sig: Take 1 capsule by mouth daily before lunch    calcitriol (ROCALTROL) 0.25 mcg capsule Past Week Family Member No Yes   Sig: Take 3 capsules (0.75 mcg total) by mouth 3 (three) times a week   calcium acetate (PHOSLO) capsule Past Week Self, Family Member No Yes   Sig: Take 1 capsule (667 mg total) by mouth 3 (three) times a day   cinacalcet (SENSIPAR) 60 MG tablet 1/9/2025 Self, Family Member No Yes   Sig: Take 1 tablet (60 mg total) by mouth daily   cloNIDine (CATAPRES) 0.2 mg tablet Past Week  No Yes   Sig: Take 1 tablet (0.2 mg total) by mouth every 12 (twelve) hours   doxazosin (CARDURA) 2 mg tablet 1/9/2025  No Yes   Sig: Take 1  tablet (2 mg total) by mouth daily   escitalopram (LEXAPRO) 20 mg tablet 1/9/2025  No Yes   Sig: Take 1 tablet by mouth once daily   famotidine (PEPCID) 10 mg tablet 1/9/2025  No Yes   Sig: Take 1 tablet (10 mg total) by mouth daily   gabapentin (NEURONTIN) 100 mg capsule 1/9/2025 Family Member No Yes   Sig: TAKE 1 CAPSULE BY MOUTH IN THE MORNING AND 2 AT BEDTIME   hydrALAZINE (APRESOLINE) 100 MG tablet 1/9/2025  No Yes   Sig: Take 1 tablet (100 mg total) by mouth every 8 (eight) hours   hydrOXYzine HCL (ATARAX) 10 mg tablet Past Week Self, Family Member No Yes   Sig: Take 1 tablet (10 mg total) by mouth every 6 (six) hours as needed for itching or anxiety   hyoscyamine (ANASPAZ,LEVSIN) 0.125 MG tablet More than a month Family Member No No   Sig: Take 1 tablet (0.125 mg total) by mouth every 4 (four) hours as needed for cramping   labetalol (NORMODYNE) 200 mg tablet 1/9/2025  No Yes   Sig: Take 2 tablets (400 mg total) by mouth 3 (three) times a day   losartan (COZAAR) 100 MG tablet 1/9/2025  No Yes   Sig: Take 1 tablet (100 mg total) by mouth daily at bedtime   ondansetron (ZOFRAN) 4 mg tablet Past Month Self, Family Member No Yes   Sig: Take 1 tablet (4 mg total) by mouth every 8 (eight) hours as needed for nausea or vomiting   oseltamivir (TAMIFLU) 30 MG capsule 1/9/2025  No Yes   Sig: Take 1 capsule (30 mg total) by mouth Before Dialysis (To be taken after dialysis on 1/8 and 1/10) for up to 2 doses Take 1 capsule (30 mg total) by mouth AFTER Dialysis   torsemide (DEMADEX) 100 mg tablet Unknown  No No   Sig: Take 1 tablet (100 mg total) by mouth daily Do not start before January 8, 2025.   traZODone (DESYREL) 50 mg tablet 1/9/2025 Family Member No Yes   Sig: TAKE 1/2 (ONE-HALF) TABLET BY MOUTH ONCE DAILY AT BEDTIME AS NEEDED FOR SLEEP      Facility-Administered Medications: None     Current Discharge Medication List        CONTINUE these medications which have NOT CHANGED    Details   aspirin (ECOTRIN LOW  STRENGTH) 81 mg EC tablet Take 81 mg by mouth daily      atorvastatin (LIPITOR) 40 mg tablet TAKE 1 TABLET BY MOUTH ONCE DAILY IN THE EVENING  Qty: 90 tablet, Refills: 1    Associated Diagnoses: Blurred vision; Cerebrovascular accident (CVA), unspecified mechanism ([facility]); Optic neuritis due to multiple sclerosis ([facility]); Hypertensive urgency; Acute renal failure with acute tubular necrosis superimposed on stage 2 chronic kidney disease  ([facility]); Acute renal failure with acute tubular necrosis superimposed on stage 3 chronic kidney disease ([facility]); Azotemia; Hyperphosphatemia; Persistent proteinuria; Vitamin D deficiency; Cerebrovascular accident (CVA) of left pontine structure ([facility]); Type 1 diabetes mellitus with diabetic nephropathy, with long-term current use of insulin ([facility]); History of lacunar cerebrovascular accident (CVA); Nausea; 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; Hyperhomocysteinemia ([facility]); Binocular visual disturbance      b complex vitamins capsule Take 1 capsule by mouth daily before lunch       calcitriol (ROCALTROL) 0.25 mcg capsule Take 3 capsules (0.75 mcg total) by mouth 3 (three) times a week  Qty: 90 capsule, Refills: 0    Associated Diagnoses: ESRD on hemodialysis ([facility]); Chronic kidney disease-mineral and bone disorder      calcium acetate (PHOSLO) capsule Take 1 capsule (667 mg total) by mouth 3 (three) times a day  Qty: 90 capsule, Refills: 0    Associated Diagnoses: End stage kidney disease ([facility])      cinacalcet (SENSIPAR) 60 MG tablet Take 1 tablet (60 mg total) by mouth daily  Qty: 30 tablet, Refills: 0    Associated Diagnoses: End stage kidney disease ([facility])      cloNIDine (CATAPRES) 0.2 mg tablet Take 1 tablet (0.2 mg total) by mouth every 12 (twelve) hours  Qty: 60 tablet, Refills: 2    Associated Diagnoses: Essential (primary) hypertension      doxazosin (CARDURA) 2 mg tablet Take 1 tablet (2 mg total) by mouth daily  Qty: 30 tablet, Refills: 2    Associated Diagnoses: Essential  (primary) hypertension      escitalopram (LEXAPRO) 20 mg tablet Take 1 tablet by mouth once daily  Qty: 90 tablet, Refills: 0    Associated Diagnoses: Moderate episode of recurrent major depressive disorder (HCC)      famotidine (PEPCID) 10 mg tablet Take 1 tablet (10 mg total) by mouth daily  Qty: 30 tablet, Refills: 2    Associated Diagnoses: Gastroesophageal reflux disease without esophagitis      gabapentin (NEURONTIN) 100 mg capsule TAKE 1 CAPSULE BY MOUTH IN THE MORNING AND 2 AT BEDTIME  Qty: 90 capsule, Refills: 5    Associated Diagnoses: Diabetic polyneuropathy associated with type 1 diabetes mellitus (HCC); Right upper extremity numbness      hydrALAZINE (APRESOLINE) 100 MG tablet Take 1 tablet (100 mg total) by mouth every 8 (eight) hours  Qty: 90 tablet, Refills: 2    Associated Diagnoses: Essential (primary) hypertension      hydrOXYzine HCL (ATARAX) 10 mg tablet Take 1 tablet (10 mg total) by mouth every 6 (six) hours as needed for itching or anxiety  Qty: 30 tablet, Refills: 3    Associated Diagnoses: Mild episode of recurrent major depressive disorder (HCC); Generalized anxiety disorder; Pruritus      labetalol (NORMODYNE) 200 mg tablet Take 2 tablets (400 mg total) by mouth 3 (three) times a day  Qty: 180 tablet, Refills: 2    Associated Diagnoses: Essential (primary) hypertension      losartan (COZAAR) 100 MG tablet Take 1 tablet (100 mg total) by mouth daily at bedtime  Qty: 30 tablet, Refills: 2    Associated Diagnoses: Essential (primary) hypertension      NIFEdipine (ADALAT CC) 60 MG 24 hr tablet Take 2 tablets (120 mg total) by mouth daily at bedtime  Qty: 60 tablet, Refills: 2    Associated Diagnoses: Essential (primary) hypertension      ondansetron (ZOFRAN) 4 mg tablet Take 1 tablet (4 mg total) by mouth every 8 (eight) hours as needed for nausea or vomiting  Qty: 90 tablet, Refills: 1    Associated Diagnoses: Vertigo      oseltamivir (TAMIFLU) 30 MG capsule Take 1 capsule (30 mg total) by  mouth Before Dialysis (To be taken after dialysis on 1/8 and 1/10) for up to 2 doses Take 1 capsule (30 mg total) by mouth AFTER Dialysis  Qty: 2 capsule, Refills: 0    Associated Diagnoses: Influenza B      Patiromer Sorbitex Calcium (VELTASSA PO) Take 5 g by mouth once a week      traZODone (DESYREL) 50 mg tablet TAKE 1/2 (ONE-HALF) TABLET BY MOUTH ONCE DAILY AT BEDTIME AS NEEDED FOR SLEEP  Qty: 30 tablet, Refills: 0    Associated Diagnoses: Mild episode of recurrent major depressive disorder (HCC); Generalized anxiety disorder      GLUCAGON EMERGENCY 1 MG injection Refills: 3      Gvoke HypoPen 1-Pack 1 MG/0.2ML SOAJ as needed      hyoscyamine (ANASPAZ,LEVSIN) 0.125 MG tablet Take 1 tablet (0.125 mg total) by mouth every 4 (four) hours as needed for cramping  Qty: 30 tablet, Refills: 0    Associated Diagnoses: Gastroenteritis      Insulin Disposable Pump (Omnipod 5 G6 Intro, Gen 5,) KIT       Insulin Disposable Pump (Omnipod 5 G6 Pod, Gen 5,) MISC       NovoLOG 100 UNIT/ML injection       SPS 15 GM/60ML suspension TAKE 60 ML BY MOUTH SINGLE DOSE FOR EMERGENCY USE DURING MISSED HEMODIALYSIS      torsemide (DEMADEX) 100 mg tablet Take 1 tablet (100 mg total) by mouth daily Do not start before January 8, 2025.  Qty: 30 tablet, Refills: 2    Associated Diagnoses: Essential (primary) hypertension           No discharge procedures on file.  ED SEPSIS DOCUMENTATION   Time reflects when diagnosis was documented in both MDM as applicable and the Disposition within this note       Time User Action Codes Description Comment    1/10/2025  2:49 AM Dru Hinds Add [I16.1] Hypertensive emergency     1/10/2025  2:49 AM Dru Hinds Add [J10.1] Influenza B     1/10/2025  3:02 AM Guanaco Collins Add [I16.0] Hypertensive urgency     1/10/2025  3:02 AM Guanaco Collins Remove [I16.0] Hypertensive urgency     1/10/2025  4:11 AM Héctor Su Add [N18.6] ESRD (end stage renal disease) (HCC)                  Dru Hinds    01/10/25 0802       Dru Hinds,   01/10/25 0809

## 2025-01-11 LAB
ERYTHROCYTE [DISTWIDTH] IN BLOOD BY AUTOMATED COUNT: 16.7 % (ref 11.6–15.1)
GLUCOSE SERPL-MCNC: 163 MG/DL (ref 65–140)
GLUCOSE SERPL-MCNC: 266 MG/DL (ref 65–140)
HCT VFR BLD AUTO: 35.2 % (ref 36.5–49.3)
HGB BLD-MCNC: 11.3 G/DL (ref 12–17)
MCH RBC QN AUTO: 32.5 PG (ref 26.8–34.3)
MCHC RBC AUTO-ENTMCNC: 32.1 G/DL (ref 31.4–37.4)
MCV RBC AUTO: 101 FL (ref 82–98)
PLATELET # BLD AUTO: 246 THOUSANDS/UL (ref 149–390)
PMV BLD AUTO: 10.3 FL (ref 8.9–12.7)
RBC # BLD AUTO: 3.48 MILLION/UL (ref 3.88–5.62)
WBC # BLD AUTO: 5.77 THOUSAND/UL (ref 4.31–10.16)

## 2025-01-11 PROCEDURE — 85027 COMPLETE CBC AUTOMATED: CPT | Performed by: PHYSICIAN ASSISTANT

## 2025-01-11 PROCEDURE — NC001 PR NO CHARGE

## 2025-01-11 PROCEDURE — 82948 REAGENT STRIP/BLOOD GLUCOSE: CPT

## 2025-01-11 PROCEDURE — 87081 CULTURE SCREEN ONLY: CPT

## 2025-01-11 PROCEDURE — 99232 SBSQ HOSP IP/OBS MODERATE 35: CPT | Performed by: STUDENT IN AN ORGANIZED HEALTH CARE EDUCATION/TRAINING PROGRAM

## 2025-01-11 PROCEDURE — 99223 1ST HOSP IP/OBS HIGH 75: CPT | Performed by: PHYSICIAN ASSISTANT

## 2025-01-11 PROCEDURE — NC001 PR NO CHARGE: Performed by: INTERNAL MEDICINE

## 2025-01-11 RX ORDER — HYDRALAZINE HYDROCHLORIDE 20 MG/ML
10 INJECTION INTRAMUSCULAR; INTRAVENOUS ONCE
Status: COMPLETED | OUTPATIENT
Start: 2025-01-11 | End: 2025-01-11

## 2025-01-11 RX ORDER — NIFEDIPINE 30 MG/1
90 TABLET, EXTENDED RELEASE ORAL 2 TIMES DAILY
Status: DISCONTINUED | OUTPATIENT
Start: 2025-01-11 | End: 2025-01-13 | Stop reason: HOSPADM

## 2025-01-11 RX ORDER — CHLORHEXIDINE GLUCONATE ORAL RINSE 1.2 MG/ML
15 SOLUTION DENTAL EVERY 12 HOURS SCHEDULED
Status: DISCONTINUED | OUTPATIENT
Start: 2025-01-11 | End: 2025-01-13 | Stop reason: HOSPADM

## 2025-01-11 RX ORDER — LABETALOL HYDROCHLORIDE 5 MG/ML
20 INJECTION, SOLUTION INTRAVENOUS ONCE
Status: COMPLETED | OUTPATIENT
Start: 2025-01-11 | End: 2025-01-11

## 2025-01-11 RX ADMIN — NIFEDIPINE 90 MG: 30 TABLET, FILM COATED, EXTENDED RELEASE ORAL at 21:27

## 2025-01-11 RX ADMIN — LABETALOL HYDROCHLORIDE 400 MG: 200 TABLET, FILM COATED ORAL at 08:11

## 2025-01-11 RX ADMIN — CHLORHEXIDINE GLUCONATE 15 ML: 1.2 RINSE ORAL at 08:06

## 2025-01-11 RX ADMIN — LABETALOL HYDROCHLORIDE 400 MG: 200 TABLET, FILM COATED ORAL at 18:56

## 2025-01-11 RX ADMIN — NIFEDIPINE 90 MG: 30 TABLET, FILM COATED, EXTENDED RELEASE ORAL at 01:07

## 2025-01-11 RX ADMIN — FAMOTIDINE 10 MG: 20 TABLET, FILM COATED ORAL at 08:06

## 2025-01-11 RX ADMIN — CINACALCET 60 MG: 30 TABLET ORAL at 09:55

## 2025-01-11 RX ADMIN — HYDRALAZINE HYDROCHLORIDE 100 MG: 25 TABLET ORAL at 21:29

## 2025-01-11 RX ADMIN — ESCITALOPRAM OXALATE 20 MG: 20 TABLET ORAL at 08:09

## 2025-01-11 RX ADMIN — CALCIUM ACETATE 667 MG: 667 CAPSULE ORAL at 21:27

## 2025-01-11 RX ADMIN — CLONIDINE HYDROCHLORIDE 0.2 MG: 0.1 TABLET ORAL at 14:07

## 2025-01-11 RX ADMIN — NICARDIPINE HYDROCHLORIDE 2.5 MG/HR: 2.5 INJECTION, SOLUTION INTRAVENOUS at 06:08

## 2025-01-11 RX ADMIN — CALCIUM ACETATE 667 MG: 667 CAPSULE ORAL at 17:31

## 2025-01-11 RX ADMIN — LABETALOL HYDROCHLORIDE 20 MG: 5 INJECTION, SOLUTION INTRAVENOUS at 03:00

## 2025-01-11 RX ADMIN — Medication 1 CAPSULE: at 11:18

## 2025-01-11 RX ADMIN — LABETALOL HYDROCHLORIDE 20 MG: 5 INJECTION, SOLUTION INTRAVENOUS at 00:27

## 2025-01-11 RX ADMIN — NIFEDIPINE 90 MG: 30 TABLET, FILM COATED, EXTENDED RELEASE ORAL at 09:55

## 2025-01-11 RX ADMIN — HYDRALAZINE HYDROCHLORIDE 10 MG: 20 INJECTION, SOLUTION INTRAMUSCULAR; INTRAVENOUS at 03:10

## 2025-01-11 RX ADMIN — ATORVASTATIN CALCIUM 40 MG: 40 TABLET, FILM COATED ORAL at 17:31

## 2025-01-11 RX ADMIN — CLONIDINE HYDROCHLORIDE 0.2 MG: 0.1 TABLET ORAL at 06:05

## 2025-01-11 RX ADMIN — LABETALOL HYDROCHLORIDE 400 MG: 200 TABLET, FILM COATED ORAL at 14:06

## 2025-01-11 RX ADMIN — LOSARTAN POTASSIUM 100 MG: 50 TABLET, FILM COATED ORAL at 21:27

## 2025-01-11 RX ADMIN — GABAPENTIN 200 MG: 100 CAPSULE ORAL at 21:26

## 2025-01-11 RX ADMIN — GABAPENTIN 100 MG: 100 CAPSULE ORAL at 08:06

## 2025-01-11 RX ADMIN — HYDRALAZINE HYDROCHLORIDE 100 MG: 25 TABLET ORAL at 06:05

## 2025-01-11 RX ADMIN — CLONIDINE HYDROCHLORIDE 0.2 MG: 0.1 TABLET ORAL at 21:28

## 2025-01-11 RX ADMIN — TRAZODONE HYDROCHLORIDE 50 MG: 50 TABLET ORAL at 21:28

## 2025-01-11 RX ADMIN — DOXAZOSIN 4 MG: 4 TABLET ORAL at 08:10

## 2025-01-11 RX ADMIN — CALCIUM ACETATE 667 MG: 667 CAPSULE ORAL at 08:06

## 2025-01-11 RX ADMIN — ASPIRIN 81 MG: 81 TABLET, COATED ORAL at 08:08

## 2025-01-11 RX ADMIN — CHLORHEXIDINE GLUCONATE 15 ML: 1.2 RINSE ORAL at 21:25

## 2025-01-11 RX ADMIN — HYDRALAZINE HYDROCHLORIDE 100 MG: 25 TABLET ORAL at 14:07

## 2025-01-11 RX ADMIN — TORSEMIDE 100 MG: 20 TABLET ORAL at 08:06

## 2025-01-11 NOTE — ASSESSMENT & PLAN NOTE
Patient has history of hypertension with end-stage renal disease. Recently admitted to ICU for similar presentation requiring Cardene. Asymptomatic.  Started on Cardene drip.     Continue Cardene drip and wean down as tolerated.  Nephrology following:   Continue clonidine 0.2 TID  Continue doxazosin 4mg  Continue losartan 100mg  Continue Lbaetalol 400mg TID  Continue Torsemide 100mg

## 2025-01-11 NOTE — ASSESSMENT & PLAN NOTE
Current hemoglobin: 11.3 mg/dL  Treatment:  No indication of Epogen at this time   Transfuse for hemoglobin less than 7.0 per primary service

## 2025-01-11 NOTE — PLAN OF CARE
Problem: Potential for Falls  Goal: Patient will remain free of falls  Description: INTERVENTIONS:  - Educate patient/family on patient safety including physical limitations  - Instruct patient to call for assistance with activity   - Consult OT/PT to assist with strengthening/mobility   - Keep Call bell within reach  - Keep bed low and locked with side rails adjusted as appropriate  - Keep care items and personal belongings within reach  - Initiate and maintain comfort rounds  - Make Fall Risk Sign visible to staff  Problem: PAIN - ADULT  Goal: Verbalizes/displays adequate comfort level or baseline comfort level  Description: Interventions:  - Encourage patient to monitor pain and request assistance  - Assess pain using appropriate pain scale  - Administer analgesics based on type and severity of pain and evaluate response  - Implement non-pharmacological measures as appropriate and evaluate response  - Consider cultural and social influences on pain and pain management  - Notify physician/advanced practitioner if interventions unsuccessful or patient reports new pain  Outcome: Progressing     Problem: INFECTION - ADULT  Goal: Absence or prevention of progression during hospitalization  Description: INTERVENTIONS:  - Assess and monitor for signs and symptoms of infection  - Monitor lab/diagnostic results  - Monitor all insertion sites, i.e. indwelling lines, tubes, and drains  - Monitor endotracheal if appropriate and nasal secretions for changes in amount and color  - [place] appropriate cooling/warming therapies per order  - Administer medications as ordered  - Instruct and encourage patient and family to use good hand hygiene technique  - Identify and instruct in appropriate isolation precautions for identified infection/condition  Outcome: Progressing  Goal: Absence of fever/infection during neutropenic period  Description: INTERVENTIONS:  - Monitor WBC    Outcome: Progressing     Problem: SAFETY  ADULT  Goal: Patient will remain free of falls  Description: INTERVENTIONS:  - Educate patient/family on patient safety including physical limitations  - Instruct patient to call for assistance with activity   - Consult OT/PT to assist with strengthening/mobility   - Keep Call bell within reach  - Keep bed low and locked with side rails adjusted as appropriate  - Keep care items and personal belongings within reach  - Initiate and maintain comfort rounds  - Make Fall Risk Sign visible to staff  - Apply yellow socks and bracelet for high fall risk patients  - Consider moving patient to room near nurses station  Outcome: Progressing  Goal: Maintain or return to baseline ADL function  Description: INTERVENTIONS:  -  Assess patient's ability to carry out ADLs; assess patient's baseline for ADL function and identify physical deficits which impact ability to perform ADLs (bathing, care of mouth/teeth, toileting, grooming, dressing, etc.)  - Assess/evaluate cause of self-care deficits   - Assess range of motion  - Assess patient's mobility; develop plan if impaired  - Assess patient's need for assistive devices and provide as appropriate  - Encourage maximum independence but intervene and supervise when necessary  - Involve family in performance of ADLs  - Assess for home care needs following discharge   - Consider OT consult to assist with ADL evaluation and planning for discharge  - Provide patient education as appropriate  Outcome: Progressing  Goal: Maintains/Returns to pre admission functional level  Description: INTERVENTIONS:  - Perform AM-PAC 6 Click Basic Mobility/ Daily Activity assessment daily.  - Set and communicate daily mobility goal to care team and patient/family/caregiver.   - Collaborate with rehabilitation services on mobility goals if consulted  Problem: Knowledge Deficit  Goal: Patient/family/caregiver demonstrates understanding of disease process, treatment plan, medications, and discharge  instructions  Description: Complete learning assessment and assess knowledge base.  Interventions:  - Provide teaching at level of understanding  - Provide teaching via preferred learning methods  Outcome: Progressing     - Out of bed for toileting  - Record patient progress and toleration of activity level   Outcome: Progressing     Problem: DISCHARGE PLANNING  Goal: Discharge to home or other facility with appropriate resources  Description: INTERVENTIONS:  - Identify barriers to discharge w/patient and caregiver  - Arrange for needed discharge resources and transportation as appropriate  - Identify discharge learning needs (meds, wound care, etc.)  - Arrange for interpretive services to assist at discharge as needed  - Refer to Case Management Department for coordinating discharge planning if the patient needs post-hospital services based on physician/advanced practitioner order or complex needs related to functional status, cognitive ability, or social support system  Outcome: Progressing     - Apply yellow socks and bracelet for high fall risk patients  - Consider moving patient to room near nurses station  Outcome: Progressing     Problem: METABOLIC, FLUID AND ELECTROLYTES - ADULT  Goal: Electrolytes maintained within normal limits  Description: INTERVENTIONS:  - Monitor labs and assess patient for signs and symptoms of electrolyte imbalances  - Administer electrolyte replacement as ordered  - Monitor response to electrolyte replacements, including repeat lab results as appropriate  - Instruct patient on fluid and nutrition as appropriate  Outcome: Progressing  Goal: Fluid balance maintained  Description: INTERVENTIONS:  - Monitor labs   - Monitor I/O and WT  - Instruct patient on fluid and nutrition as appropriate  - Assess for signs & symptoms of volume excess or deficit  Outcome: Progressing

## 2025-01-11 NOTE — ASSESSMENT & PLAN NOTE
Lab Results   Component Value Date    EGFR 6 01/10/2025    EGFR 6 01/09/2025    EGFR 6 01/07/2025    CREATININE 9.05 (H) 01/10/2025    CREATININE 9.37 (H) 01/09/2025    CREATININE 8.77 (H) 01/07/2025   #ESRD on HD MWF:  Dialysis unit/days: [facility]  Access: AV fistula  Patient had dialysis yesterday, UF 2 L, well-tolerated   Fluid restriction 1.8 L  Plan to continue as she has per schedule, next dialysis Monday   Avoid opioids

## 2025-01-11 NOTE — PLAN OF CARE
Problem: Potential for Falls  Goal: Patient will remain free of falls  Description: INTERVENTIONS:  - Educate patient/family on patient safety including physical limitations  - Instruct patient to call for assistance with activity   - Consult OT/PT to assist with strengthening/mobility   - Keep Call bell within reach  - Keep bed low and locked with side rails adjusted as appropriate  - Keep care items and personal belongings within reach  - Initiate and maintain comfort rounds  - Make Fall Risk Sign visible to staff  - Offer Toileting every 2 Hours, in advance of need  - Apply yellow socks and bracelet for high fall risk patients  - Consider moving patient to room near nurses station  Outcome: Progressing     Problem: METABOLIC, FLUID AND ELECTROLYTES - ADULT  Goal: Electrolytes maintained within normal limits  Description: INTERVENTIONS:  - Monitor labs and assess patient for signs and symptoms of electrolyte imbalances  - Administer electrolyte replacement as ordered  - Monitor response to electrolyte replacements, including repeat lab results as appropriate  - Instruct patient on fluid and nutrition as appropriate  Outcome: Progressing  Goal: Fluid balance maintained  Description: INTERVENTIONS:  - Monitor labs   - Monitor I/O and WT  - Instruct patient on fluid and nutrition as appropriate  - Assess for signs & symptoms of volume excess or deficit  Outcome: Progressing     Problem: PAIN - ADULT  Goal: Verbalizes/displays adequate comfort level or baseline comfort level  Description: Interventions:  - Encourage patient to monitor pain and request assistance  - Assess pain using appropriate pain scale  - Administer analgesics based on type and severity of pain and evaluate response  - Implement non-pharmacological measures as appropriate and evaluate response  - Consider cultural and social influences on pain and pain management  - Notify physician/advanced practitioner if interventions unsuccessful or patient  reports new pain  Outcome: Progressing     Problem: INFECTION - ADULT  Goal: Absence or prevention of progression during hospitalization  Description: INTERVENTIONS:  - Assess and monitor for signs and symptoms of infection  - Monitor lab/diagnostic results  - Monitor all insertion sites, i.e. indwelling lines, tubes, and drains  - Monitor endotracheal if appropriate and nasal secretions for changes in amount and color  - [place] appropriate cooling/warming therapies per order  - Administer medications as ordered  - Instruct and encourage patient and family to use good hand hygiene technique  - Identify and instruct in appropriate isolation precautions for identified infection/condition  Outcome: Progressing     Problem: Knowledge Deficit  Goal: Patient/family/caregiver demonstrates understanding of disease process, treatment plan, medications, and discharge instructions  Description: Complete learning assessment and assess knowledge base.  Interventions:  - Provide teaching at level of understanding  - Provide teaching via preferred learning methods  Outcome: Progressing

## 2025-01-11 NOTE — QUICK NOTE
39-year-old male with end-stage renal disease and resistant hypertension patient has been having persistently elevated blood pressure despite multiple  Oral and IV medications throughout the night  Patient has received oral nifedipine, hydralazine, doxazosin, labetalol, clonidine  hydralazine 10 mg IV x 2, labetalol IV 20 mg x 2  Systolic blood pressure still greater than 200  At bedside patient denies any headaches, chest pain shortness of breath  /82    Discussed with critical care team  Patient will be made stepdown 1 for Cardene drip

## 2025-01-11 NOTE — PLAN OF CARE
Problem: Potential for Falls  Goal: Patient will remain free of falls  Description: INTERVENTIONS:  - Educate patient/family on patient safety including physical limitations  - Instruct patient to call for assistance with activity   - Consult OT/PT to assist with strengthening/mobility   - Keep Call bell within reach  - Keep bed low and locked with side rails adjusted as appropriate  - Keep care items and personal belongings within reach  - Initiate and maintain comfort rounds  - Make Fall Risk Sign visible to staff  - Apply yellow socks and bracelet for high fall risk patients  - Consider moving patient to room near nurses station  Outcome: Progressing     Problem: METABOLIC, FLUID AND ELECTROLYTES - ADULT  Goal: Electrolytes maintained within normal limits  Description: INTERVENTIONS:  - Monitor labs and assess patient for signs and symptoms of electrolyte imbalances  - Administer electrolyte replacement as ordered  - Monitor response to electrolyte replacements, including repeat lab results as appropriate  - Instruct patient on fluid and nutrition as appropriate  Outcome: Progressing  Goal: Fluid balance maintained  Description: INTERVENTIONS:  - Monitor labs   - Monitor I/O and WT  - Instruct patient on fluid and nutrition as appropriate  - Assess for signs & symptoms of volume excess or deficit  Outcome: Progressing     Problem: PAIN - ADULT  Goal: Verbalizes/displays adequate comfort level or baseline comfort level  Description: Interventions:  - Encourage patient to monitor pain and request assistance  - Assess pain using appropriate pain scale  - Administer analgesics based on type and severity of pain and evaluate response  - Implement non-pharmacological measures as appropriate and evaluate response  - Consider cultural and social influences on pain and pain management  - Notify physician/advanced practitioner if interventions unsuccessful or patient reports new pain  Outcome: Progressing     Problem:  INFECTION - ADULT  Goal: Absence or prevention of progression during hospitalization  Description: INTERVENTIONS:  - Assess and monitor for signs and symptoms of infection  - Monitor lab/diagnostic results  - Monitor all insertion sites, i.e. indwelling lines, tubes, and drains  - Monitor endotracheal if appropriate and nasal secretions for changes in amount and color  - [place] appropriate cooling/warming therapies per order  - Administer medications as ordered  - Instruct and encourage patient and family to use good hand hygiene technique  - Identify and instruct in appropriate isolation precautions for identified infection/condition  Outcome: Progressing  Goal: Absence of fever/infection during neutropenic period  Description: INTERVENTIONS:  - Monitor WBC    Outcome: Progressing     Problem: SAFETY ADULT  Goal: Patient will remain free of falls  Description: INTERVENTIONS:  - Educate patient/family on patient safety including physical limitations  - Instruct patient to call for assistance with activity   - Consult OT/PT to assist with strengthening/mobility   - Keep Call bell within reach  - Keep bed low and locked with side rails adjusted as appropriate  - Keep care items and personal belongings within reach  - Initiate and maintain comfort rounds  - Make Fall Risk Sign visible to staff    - Apply yellow socks and bracelet for high fall risk patients  - Consider moving patient to room near nurses station  Outcome: Progressing  Goal: Maintain or return to baseline ADL function  Description: INTERVENTIONS:  -  Assess patient's ability to carry out ADLs; assess patient's baseline for ADL function and identify physical deficits which impact ability to perform ADLs (bathing, care of mouth/teeth, toileting, grooming, dressing, etc.)  - Assess/evaluate cause of self-care deficits   - Assess range of motion  - Assess patient's mobility; develop plan if impaired  - Assess patient's need for assistive devices and  provide as appropriate  - Encourage maximum independence but intervene and supervise when necessary  - Involve family in performance of ADLs  - Assess for home care needs following discharge   - Consider OT consult to assist with ADL evaluation and planning for discharge  - Provide patient education as appropriate  Outcome: Progressing  Goal: Maintains/Returns to pre admission functional level  Description: INTERVENTIONS:  - Perform AM-PAC 6 Click Basic Mobility/ Daily Activity assessment daily.  - Set and communicate daily mobility goal to care team and patient/family/caregiver.   - Collaborate with rehabilitation services on mobility goals if consulted  - Out of bed for toileting  - Record patient progress and toleration of activity level   Outcome: Progressing     Problem: DISCHARGE PLANNING  Goal: Discharge to home or other facility with appropriate resources  Description: INTERVENTIONS:  - Identify barriers to discharge w/patient and caregiver  - Arrange for needed discharge resources and transportation as appropriate  - Identify discharge learning needs (meds, wound care, etc.)  - Arrange for interpretive services to assist at discharge as needed  - Refer to Case Management Department for coordinating discharge planning if the patient needs post-hospital services based on physician/advanced practitioner order or complex needs related to functional status, cognitive ability, or social support system  Outcome: Progressing     Problem: Knowledge Deficit  Goal: Patient/family/caregiver demonstrates understanding of disease process, treatment plan, medications, and discharge instructions  Description: Complete learning assessment and assess knowledge base.  Interventions:  - Provide teaching at level of understanding  - Provide teaching via preferred learning methods  Outcome: Progressing

## 2025-01-11 NOTE — ASSESSMENT & PLAN NOTE
Volume: Euvolemic on exam   Blood pressure: Remains hypertensive, /91, transferred to ICU  Will continue UF on HD  Cardura increased to 4 mg  Hydralazine 100 mg 3 times daily  Labetalol 400 mg 3 times daily  Losartan 100 mg daily  Nifedipine increased to 90 mg twice daily  Torsemide 100  Currently on every BP agent  On nicardipine drip

## 2025-01-11 NOTE — PROGRESS NOTES
Progress Note - Nephrology   Name: Mateus Mckeon 39 y.o. male I MRN: [id number]  Unit/Bed#: ICU 15 I Date of Admission: 1/9/2025   Date of Service: 1/11/2025 I Hospital Day: 1     Assessment & Plan  ESRD (end stage renal disease) (HCC)  Lab Results   Component Value Date    EGFR 6 01/10/2025    EGFR 6 01/09/2025    EGFR 6 01/07/2025    CREATININE 9.05 (H) 01/10/2025    CREATININE 9.37 (H) 01/09/2025    CREATININE 8.77 (H) 01/07/2025   #ESRD on HD MWF:  [place] [facility] [place]  Access: AV fistula  Patient had dialysis yesterday, UF 2 L, well-tolerated   Fluid restriction 1.8 L  Plan to continue as she has per schedule, next dialysis Monday   Avoid opioids     Hypertensive urgency    Volume: Euvolemic on exam   Blood pressure: Remains hypertensive, /91, transferred to ICU  Will continue UF on HD  Cardura increased to 4 mg  Hydralazine 100 mg 3 times daily  Labetalol 400 mg 3 times daily  Losartan 100 mg daily  Nifedipine increased to 90 mg twice daily  Torsemide 100  Currently on every BP agent  On nicardipine drip  Anemia of chronic disease  Current hemoglobin: 11.3 mg/dL  Treatment:  No indication of Epogen at this time   Transfuse for hemoglobin less than 7.0 per primary service    Secondary hyperparathyroidism of renal origin (HCC)    Continue PhosLo 3 times daily with meals 3 tablets  Cinacalcet 60 mg daily      I have reviewed the nephrology recommendations including hemodialysis Monday, with primary team , and we are in agreement with renal plan including the information outlined above. I have discussed the above management plan in detail with the primary service.     Subjective   Brief History of Admission - 39 y.o. male with PMH of ESRD on HD at [facility] [place] [facility], hypertension with multiple admissions with uncontrolled hypertension, fluid overload, CVA, ICH.  Patient recently discharge 24 to 48 hours ago who was admitted to [facility] after presenting with hypertension urgency. A renal consultation  is requested today for assistance in the management of ESRD.     Feels well, no CP, no SOB. Patient had HD yesterday well tolerated    Objective :  Temp:  [97.6 °F (36.4 °C)-98.4 °F (36.9 °C)] 97.8 °F (36.6 °C)  HR:  [59-66] 66  BP: (144-270)/() 144/75  Resp:  [14-39] 39  SpO2:  [97 %-98 %] 97 %  O2 Device: None (Room air)    Current Weight: Weight - Scale: 87.8 kg (193 lb 9.6 oz)  First Weight: Weight - Scale: 88.4 kg (194 lb 14.2 oz)  I/O         01/09 0701  01/10 0700 01/10 0701 01/11 0700    P.O.  240    I.V. (mL/kg)  500 (5.7)    Total Intake(mL/kg)  740 (8.4)    Other  2500    Total Output  2500    Net  -1760                Physical Exam  General:  no acute distress at this time  Skin:  No acute rash  Eyes:  No scleral icterus and noninjected  ENT:  mucous membranes moist  Neck:  no carotid bruits  Chest:  Clear to auscultation percussion, good respiratory effort, no use of accessory respiratory muscles  CVS:  Regular rate and rhythm without rub   Abdomen:  soft and nontender   Extremities: no significant lower extremity edema  Neuro:  No gross focality  Psych:  Alert , cooperative ..  Dialysis access: AV fistula    Medications:    Current Facility-Administered Medications:     aspirin (ECOTRIN LOW STRENGTH) EC tablet 81 mg, 81 mg, Oral, Daily, Katerina Robles PA-C, 81 mg at 01/10/25 0853    atorvastatin (LIPITOR) tablet 40 mg, 40 mg, Oral, QPM, Katerina Robles PA-C, 40 mg at 01/10/25 1702    calcitriol (ROCALTROL) capsule 0.75 mcg, 0.75 mcg, Oral, Once per day on Monday Wednesday Friday, Katerina Robles PA-C, 0.75 mcg at 01/10/25 0859    calcium acetate (PHOSLO) capsule 667 mg, 667 mg, Oral, TID, Katerina Robles PA-C, 667 mg at 01/10/25 2015    chlorhexidine (PERIDEX) 0.12 % oral rinse 15 mL, 15 mL, Mouth/Throat, Q12H HALIE, Katerina Robles PA-C    cinacalcet (SENSIPAR) tablet 60 mg, 60 mg, Oral, Daily, Katerina Robles PA-C, 60 mg at 01/10/25 0859    cloNIDine (CATAPRES) tablet 0.2 mg, 0.2  mg, Oral, Q8H HALIE, Katerina Robles PA-C, 0.2 mg at 01/11/25 0605    doxazosin (CARDURA) tablet 4 mg, 4 mg, Oral, Daily, Katerina Robles PA-C    escitalopram (LEXAPRO) tablet 20 mg, 20 mg, Oral, Daily, Katerina Robles PA-C, 20 mg at 01/10/25 0853    famotidine (PEPCID) tablet 10 mg, 10 mg, Oral, Daily, Katerina Robles PA-C, 10 mg at 01/10/25 0853    gabapentin (NEURONTIN) capsule 100 mg, 100 mg, Oral, Daily, 100 mg at 01/10/25 0853 **AND** gabapentin (NEURONTIN) capsule 200 mg, 200 mg, Oral, HS, Katerina Robles PA-C, 200 mg at 01/10/25 2106    hydrALAZINE (APRESOLINE) tablet 100 mg, 100 mg, Oral, Q8H HALIE, Katerina Robles PA-C, 100 mg at 01/11/25 0605    hydrOXYzine HCL (ATARAX) tablet 10 mg, 10 mg, Oral, Q6H PRN, Katerina Robles PA-C    labetalol (NORMODYNE) tablet 400 mg, 400 mg, Oral, TID, Katerina Robles PA-C, 400 mg at 01/10/25 1835    losartan (COZAAR) tablet 100 mg, 100 mg, Oral, HS, Katerina Robles PA-C, 100 mg at 01/10/25 2106    niCARdipine (CARDENE) 25 mg (STANDARD CONCENTRATION) in sodium chloride 0.9% 250 mL, 1-15 mg/hr, Intravenous, Titrated, Katerina Robles PA-C, Last Rate: 25 mL/hr at 01/11/25 0701, 2.5 mg/hr at 01/11/25 0701    NIFEdipine (PROCARDIA XL) 24 hr tablet 90 mg, 90 mg, Oral, BID, Katerina Robles PA-C, 90 mg at 01/11/25 0107    ondansetron (ZOFRAN-ODT) dispersible tablet 4 mg, 4 mg, Oral, Q6H PRN, Katerina Robles PA-C    oseltamivir (TAMIFLU) capsule 30 mg, 30 mg, Oral, After Dialysis, Héctor Su MD, 30 mg at 01/10/25 1835    torsemide (DEMADEX) tablet 100 mg, 100 mg, Oral, Daily, Katerina Robles PA-C    traZODone (DESYREL) tablet 50 mg, 50 mg, Oral, HS, Katerina Robles PA-C, 50 mg at 01/10/25 2106    vit B complex-vit C-folic acid (Renal Caps) capsule 1 capsule, 1 capsule, Oral, Daily Before Lunch, Katerina Robles PA-C, 1 capsule at 01/10/25 1324      Lab Results: I have reviewed the following results:  Results from last 7 days   Lab Units 01/11/25  4088  "01/10/25  1324 01/09/25  2259 01/07/25  0526 01/06/25  0446 01/05/25  0555 01/04/25  2324 01/04/25  1935   WBC Thousand/uL 5.77  --  5.58 4.56 5.07 4.80  --  5.30   HEMOGLOBIN g/dL 11.3*  --  11.1* 10.6* 11.1* 10.9*  --  11.8*   HEMATOCRIT % 35.2*  --  33.9* 33.0* 33.9* 33.3*  --  36.4*   PLATELETS Thousands/uL 246  --  274 282 243 211 189 227   POTASSIUM mmol/L  --  4.4 4.4 4.2 4.4 4.4  --  4.6   CHLORIDE mmol/L  --  99 97 102 100 101  --  98   CO2 mmol/L  --  32 34* 29 29 30  --  32   BUN mg/dL  --  20 19 21 32* 24  --  22   CREATININE mg/dL  --  9.05* 9.37* 8.77* 12.10* 9.71*  --  9.02*   CALCIUM mg/dL  --  8.3* 8.5 8.1* 8.5 8.5  --  9.1   MAGNESIUM mg/dL  --   --   --  2.3 2.4 2.4  --  2.5   PHOSPHORUS mg/dL  --   --   --  4.8*  --  4.8*  --   --    ALBUMIN g/dL  --   --  4.0  --   --  3.9  --  4.4       Administrative Statements     Portions of the record may have been created with voice recognition software. Occasional wrong word or \"sound a like\" substitutions may have occurred due to the inherent limitations of voice recognition software. Read the chart carefully and recognize, using context, where substitutions have occurred.If you have any questions, please contact the dictating provider.  "

## 2025-01-11 NOTE — H&P
"H&P - Critical Care/ICU   Name: Mateus Mckeon 39 y.o. male I MRN: [id number]  Unit/Bed#: -01 I Date of Admission: 1/9/2025   Date of Service: 1/11/2025 I Hospital Day: 1       Assessment & Plan  Hypertensive urgency  Patient has history of hypertension with end-stage renal disease. Recently admitted to ICU for similar presentation requiring Cardene. Asymptomatic.  Started on Cardene drip.     Continue Cardene drip and wean down as tolerated.  Nephrology following:   Continue clonidine 0.2 TID  Continue doxazosin 4mg  Continue losartan 100mg  Continue Lbaetalol 400mg TID  Continue Torsemide 100mg  Type 1 diabetes (HCC)  Lab Results   Component Value Date    HGBA1C 6.9 (H) 11/05/2024       No results for input(s): \"POCGLU\" in the last 72 hours.    Blood Sugar Average: Last 72 hrs:    Sliding Scale  History of CVA (cerebrovascular accident)    ESRD (end stage renal disease) ([facility])  Lab Results   Component Value Date    EGFR 6 01/10/2025    EGFR 6 01/09/2025    EGFR 6 01/07/2025    CREATININE 9.05 (H) 01/10/2025    CREATININE 9.37 (H) 01/09/2025    CREATININE 8.77 (H) 01/07/2025      Kidney function baseline appears to fluctuate between 7 and 10.   Hemodialysis Monday Wednesday Fridays   Nephrology consulted  Disposition: Stepdown Level 1    History of Present Illness   Mateus Mckeon is a 39 y.o. hx ESRD, HTN who returned to the hospital for hypertensive urgency with BP's In the 200's. Consulted by medicine for persistently elevated BP despite medication adjustments. No acute symptoms. Was recently admitted to the ICU requiring cardene drip for BP stabilization. Was transitioned to PO regiment.     History obtained from chart review and medicine provider.  Review of Systems: Review of Systems   Constitutional: Negative.    HENT: Negative.     Respiratory: Negative.     Gastrointestinal: Negative.    Genitourinary: Negative.    Musculoskeletal: Negative.         C/o chronic neuropathy in b/l feet   Skin: " Negative.        Historical Information   Past Medical History:  No date: Acute kidney injury (HCC)  No date: Ambulates with cane  No date: Anuria  No date: Anxiety  03/31/2021: Cellulitis of right elbow  No date: Chronic kidney disease  10/18/2024: COVID-19  No date: Depression  No date: Diabetes mellitus (HCC)  No date: Diarrhea  10/24/2020: Emesis  02/11/2018: End stage renal disease (HCC)      Comment:  Formatting of this note might be different from the                original. Last Assessment & Plan:  Secondary to DM.  On                nightly PD.  Followed by Nephro.  Patient considering                transplant for kidney and pancreas through [facility]                Formatting of this note might be different from the                original. Last Assessment & Plan:  Formatting of this                note might be different from the original. 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  07/21/2015: Esophagitis  No date: Falls  No date: Gastroparesis  No date: GERD (gastroesophageal reflux disease)  2010: History of shingles  02/2018: History of transfusion      Comment:  no adverse reaction  No date: Hyperlipidemia  No date: Hyperphosphatemia  No date: Hypertension  07/15/2022: Hypoglycemia  No date: Itching  07/15/2022: Mastoiditis of right side  No date: Muscle weakness      Comment:  general unsteadiness  09/09/2019: Obesity (BMI 30.0-34.9)  10/25/2020: Orthostatic hypotension  11/20/2019: Peripheral polyneuropathy  01/26/2018: PONV (postoperative nausea and vomiting)  11/23/2020: Protein-calorie malnutrition (HCC)  07/31/2020: Recurrent peritonitis (HCC) due to peritoneal dialysis   catheter  No date: Retinopathy  No date: Seizures ([facility])      Comment:  early 2020 - one time  No date: Skin abnormality      Comment:  some dime size areas where skin was scratched from                itching  No date: Sleep apnea  10/19/2020: Spontaneous bacterial peritonitis  (HCC)  No date: Squamous cell skin cancer      Comment:  left temple  No date: Stroke (HCC)      Comment:  x2 - off balance/no driving/fatigue  No date: Swelling of both lower extremities  04/03/2024: Swelling of joint of upper arm, right  07/21/2022: Traumatic onycholysis  No date: Vomiting  No date: Wears glasses  11/05/2024: Word finding difficulty Past Surgical History:  9/21/2023: CARDIAC ELECTROPHYSIOLOGY PROCEDURE; N/A      Comment:  Procedure: Cardiac loop recorder explant;  Surgeon:                Parish Morgan MD;  Location: [facility];                 Service: Cardiology  05/2018: CARDIAC LOOP RECORDER  No date: COLONOSCOPY  No date: EGD  No date: EYE SURGERY; Right  11/6/2024: HEMODIALYSIS ADULT  02/23/2021: IR AV FISTULAGRAM/GRAFTOGRAM  1/12/2024: IR CEREBRAL ANGIOGRAPHY  1/5/2024: IR CEREBRAL ANGIOGRAPHY / INTERVENTION  02/16/2021: IR TUNNELED CENTRAL LINE PLACEMENT  11/18/2020: IR TUNNELED DIALYSIS CATHETER PLACEMENT  02/12/2021: IR TUNNELED DIALYSIS CATHETER REMOVAL  03/11/2021: IR TUNNELED DIALYSIS CATHETER REMOVAL  12/14/2022: MOHS SURGERY; Left      Comment:  Left temple with Dr. Hassan  08/27/2018: PERITONEAL CATHETER INSERTION; N/A      Comment:  Procedure: UNROOF PD CATHETER;  Surgeon: Felipe Lindo DO;  Location: [facility];  Service: General  11/09/2020: NC ARTERIOVENOUS ANASTOMOSIS OPEN DIRECT; Left      Comment:  Procedure: CREATION FISTULA  ARTERIOVENOUS (AV) - LEFT                WRIST;  Surgeon: Placido Altamirano MD;  Location: [facility];                 Service: Vascular  04/18/2019: NC ESOPHAGOGASTRODUODENOSCOPY TRANSORAL DIAGNOSTIC; N/A      Comment:  Procedure: ESOPHAGOGASTRODUODENOSCOPY (EGD);  Surgeon:                Ale Figueroa MD;  Location: [facility];  Service:                Gastroenterology  08/06/2018: NC LAPS INSERTION TUNNELED INTRAPERITONEAL CATHETER; N/A      Comment:  Procedure: LAPAROSCOPIC PD CATHETER PLACEMENT;  Surgeon:                Felipe Lindo DO;  Location: [facility];  Service:                General  11/18/2020: NV REMOVAL TUNNELED INTRAPERITONEAL CATHETER; N/A      Comment:  Procedure: REMOVAL CATHETER PERITONEAL DIALYSIS;                 Surgeon: Abdifatah Ty MD;  Location: [facility];                 Service: General  No date: TONSILLECTOMY  No date: UPPER GASTROINTESTINAL ENDOSCOPY   Current Outpatient Medications   Medication Instructions    aspirin (ECOTRIN LOW STRENGTH) 81 mg, Daily    atorvastatin (LIPITOR) 40 mg, Oral, Every evening    b complex vitamins capsule 1 capsule, Daily before lunch    calcitriol (ROCALTROL) 0.75 mcg, Oral, 3 times weekly    calcium acetate (PHOSLO) 667 mg, Oral, 3 times daily    cinacalcet (SENSIPAR) 60 mg, Oral, Daily    cloNIDine (CATAPRES) 0.2 mg, Oral, Every 12 hours scheduled    doxazosin (CARDURA) 2 mg, Oral, Daily    escitalopram (LEXAPRO) 20 mg, Oral, Daily    famotidine (PEPCID) 10 mg, Oral, Daily    gabapentin (NEURONTIN) 100 mg capsule TAKE 1 CAPSULE BY MOUTH IN THE MORNING AND 2 AT BEDTIME    GLUCAGON EMERGENCY 1 MG injection     Gvoke HypoPen 1-Pack 1 MG/0.2ML SOAJ As needed    hydrALAZINE (APRESOLINE) 100 mg, Oral, Every 8 hours scheduled    hydrOXYzine HCL (ATARAX) 10 mg, Oral, Every 6 hours PRN    hyoscyamine (ANASPAZ,LEVSIN) 0.125 mg, Oral, Every 4 hours PRN    Insulin Disposable Pump (Omnipod 5 G6 Intro, Gen 5,) KIT No dose, route, or frequency recorded.    Insulin Disposable Pump (Omnipod 5 G6 Pod, Gen 5,) MISC No dose, route, or frequency recorded.    labetalol (NORMODYNE) 400 mg, Oral, 3 times daily    losartan (COZAAR) 100 mg, Oral, Daily at bedtime    NIFEdipine ER (ADALAT CC) 120 mg, Oral, Daily at bedtime    NovoLOG 100 UNIT/ML injection     ondansetron (ZOFRAN) 4 mg, Oral, Every 8 hours PRN    oseltamivir (TAMIFLU) 30 mg, Oral, Before Dialysis, Take 1 capsule (30 mg total) by mouth AFTER Dialysis    Patiromer Sorbitex Calcium (VELTASSA PO) 5 g, Weekly    SPS 15 GM/60ML  suspension TAKE 60 ML BY MOUTH SINGLE DOSE FOR EMERGENCY USE DURING MISSED HEMODIALYSIS    torsemide (DEMADEX) 100 mg, Oral, Daily    traZODone (DESYREL) 50 mg tablet TAKE 1/2 (ONE-HALF) TABLET BY MOUTH ONCE DAILY AT BEDTIME AS NEEDED FOR SLEEP    Allergies   Allergen Reactions    Sulfa Antibiotics Rash      Social History     Tobacco Use    Smoking status: Former     Current packs/day: 0.00     Average packs/day: 0.5 packs/day for 12.0 years (6.0 ttl pk-yrs)     Types: Cigarettes     Start date: 2006     Quit date: 2018     Years since quittin.9    Smokeless tobacco: Never    Tobacco comments:     quit 2018   Vaping Use    Vaping status: Every Day    Substances: THC, CBD   Substance Use Topics    Alcohol use: Not Currently    Drug use: Yes     Types: Marijuana     Comment: medical marijuana last vaped 2024    Family History   Problem Relation Age of Onset    Breast cancer Mother     Hypertension Mother     Hyperlipidemia Father     Hypertension Father     Leukemia Maternal Grandmother     Hyperlipidemia Maternal Grandfather     Hypertension Maternal Grandfather     Hyperlipidemia Paternal Grandmother     Hypertension Paternal Grandmother     Heart disease Paternal Grandfather         cardiac disorder    Diabetes Paternal Grandfather           Objective :                   Vitals I/O      Most Recent Min/Max in 24hrs   Temp 98.4 °F (36.9 °C) Temp  Min: 97.5 °F (36.4 °C)  Max: 98.4 °F (36.9 °C)   Pulse 66 Pulse  Min: 59  Max: 66   Resp 16 Resp  Min: 16  Max: 20   BP (!) 206/82 BP  Min: 158/76  Max: 270/108   O2 Sat 97 % SpO2  Min: 97 %  Max: 97 %      Intake/Output Summary (Last 24 hours) at 2025 0458  Last data filed at 1/10/2025 1714  Gross per 24 hour   Intake 740 ml   Output 2500 ml   Net -1760 ml       Diet Regular; Regular House    Invasive Monitoring           Physical Exam   Physical Exam  Vitals and nursing note reviewed.   Eyes:      Conjunctiva/sclera: Conjunctivae normal.      Pupils:  Pupils are equal, round, and reactive to light.   Skin:     General: Skin is warm and dry.   HENT:      Head: Normocephalic and atraumatic.      Mouth/Throat:      Mouth: Mucous membranes are moist.      Pharynx: Oropharynx is clear.   Cardiovascular:      Rate and Rhythm: Normal rate and regular rhythm.   Musculoskeletal:      Cervical back: Neck supple.   Abdominal: General: There is no distension.      Palpations: Abdomen is soft.      Tenderness: There is no abdominal tenderness.   Constitutional:       General: He is awake. He is not in acute distress.     Appearance: He is well-developed. He is not ill-appearing.   Pulmonary:      Effort: Pulmonary effort is normal.      Breath sounds: Normal breath sounds.   Neurological:      General: No focal deficit present.      Mental Status: He is alert, oriented to person, place, and time and oriented to person, place and time.      GCS: GCS eye subscore is 4. GCS verbal subscore is 5. GCS motor subscore is 6.      Motor: Strength full and intact in all extremities.          Diagnostic Studies        Lab Results: I have reviewed the following results:     Medications:  Scheduled PRN   aspirin, 81 mg, Daily  atorvastatin, 40 mg, QPM  calcitriol, 0.75 mcg, Once per day on Monday Wednesday Friday  calcium acetate, 667 mg, TID  chlorhexidine, 15 mL, Q12H HALIE  cinacalcet, 60 mg, Daily  cloNIDine, 0.2 mg, Q8H HALIE  doxazosin, 4 mg, Daily  escitalopram, 20 mg, Daily  famotidine, 10 mg, Daily  gabapentin, 100 mg, Daily   And  gabapentin, 200 mg, HS  hydrALAZINE, 100 mg, Q8H HALIE  labetalol, 400 mg, TID  losartan, 100 mg, HS  NIFEdipine, 90 mg, BID  torsemide, 100 mg, Daily  traZODone, 50 mg, HS  vit B complex-vit C-folic acid, 1 capsule, Daily Before Lunch      hydrOXYzine HCL, 10 mg, Q6H PRN  ondansetron, 4 mg, Q6H PRN  oseltamivir, 30 mg, After Dialysis       Continuous    niCARdipine, 1-15 mg/hr         Labs:   CBC    Recent Labs     01/09/25  2259   WBC 5.58   HGB 11.1*    HCT 33.9*        BMP    Recent Labs     01/09/25 2259 01/10/25  1324   SODIUM 140 139   K 4.4 4.4   CL 97 99   CO2 34* 32   AGAP 9 8   BUN 19 20   CREATININE 9.37* 9.05*   CALCIUM 8.5 8.3*       Coags    No recent results     Additional Electrolytes  No recent results       Blood Gas    No recent results  No recent results LFTs  Recent Labs     01/09/25  2259   ALT 11   AST 20   ALKPHOS 86   ALB 4.0   TBILI 0.53       Infectious  No recent results  Glucose  Recent Labs     01/09/25  2259 01/10/25  1324   GLUC 190* 132

## 2025-01-11 NOTE — PROGRESS NOTES
Progress Note - Critical Care/ICU   Name: Mateus Mckeon 39 y.o. male I MRN: [id number]  Unit/Bed#: ICU 15 I Date of Admission: 1/9/2025   Date of Service: 1/11/2025 I Hospital Day: 1       Critical Care Attending Note    39 year old man with ESRD on HD, HTN, DM, GERD, presented for elevated BP.  Had similar recent presentation and required nicardipine gtt.  Recently Flu B (+).      24hr events - feels improved, resolved HA and nausea    VS AF, HR 60's, -240's (average 180-220's), DBP 70-90's, MAPS 100-130, SpO2 97% RA, I/Os -1.7L  Exam  GEN Middle aged man in chair, conversant, nontoxic  HEENT ATNC, MMM  Neck No accessory muscle use, JVP not elevated  CV reg, single s1/2, no m/r  Pulm clear no wheeze or rales  EXT warm, LUE AVF in place, no edema      Laboratory and Diagnostics  Results from last 7 days   Lab Units 01/11/25  0519 01/09/25  2259 01/07/25  0526 01/06/25  0446 01/05/25  0555 01/04/25  2324 01/04/25  1935   WBC Thousand/uL 5.77 5.58 4.56 5.07 4.80  --  5.30   HEMOGLOBIN g/dL 11.3* 11.1* 10.6* 11.1* 10.9*  --  11.8*   HEMATOCRIT % 35.2* 33.9* 33.0* 33.9* 33.3*  --  36.4*   PLATELETS Thousands/uL 246 274 282 243 211 189 227   SEGS PCT %  --  53  --   --  55  --  59   MONO PCT %  --  9  --   --  9  --  7   EOS PCT %  --  6  --   --  7*  --  6     Results from last 7 days   Lab Units 01/10/25  1324 01/09/25  2259 01/07/25  0526 01/06/25  0446 01/05/25  0555 01/04/25  1935   SODIUM mmol/L 139 140 141 140 142 141   POTASSIUM mmol/L 4.4 4.4 4.2 4.4 4.4 4.6   CHLORIDE mmol/L 99 97 102 100 101 98   CO2 mmol/L 32 34* 29 29 30 32   ANION GAP mmol/L 8 9 10 11 11 11   BUN mg/dL 20 19 21 32* 24 22   CREATININE mg/dL 9.05* 9.37* 8.77* 12.10* 9.71* 9.02*   CALCIUM mg/dL 8.3* 8.5 8.1* 8.5 8.5 9.1   GLUCOSE RANDOM mg/dL 132 190* 133 153* 144* 162*   ALT U/L  --  11  --   --  9 12   AST U/L  --  20  --   --  18 19   ALK PHOS U/L  --  86  --   --  75 95   ALBUMIN g/dL  --  4.0  --   --  3.9 4.4   TOTAL  BILIRUBIN mg/dL  --  0.53  --   --  0.50 0.59     Results from last 7 days   Lab Units 01/07/25  0526 01/06/25  0446 01/05/25  0555 01/04/25  1935   MAGNESIUM mg/dL 2.3 2.4 2.4 2.5   PHOSPHORUS mg/dL 4.8*  --  4.8*  --       Results from last 7 days   Lab Units 01/05/25  0555 01/04/25  1935   INR  1.01 0.90   PTT seconds  --  31       MICRO  MRSA pending  MRSA 01/06 neg  FLU B Ag (+) 01/06    RADIOGRAPHS - New images and reports personally reviewed   CXR 01/09 - no focal infiltrates, mild CM, no PTX    Assessment  Hypertensive urgency with chronic severe HTN  ESRD on HD  Recent FLU B infection  DM    PLAN  Clinically improved and off nicardipine  Increased oral BP meds per nephrology with good response  Continue HD per nephrology  Completed tamiflu   Stable for downgrade M/S with sLIM       I have personally provided 0 minutes of critical care time, exclusive of procedures, teaching, and any prior time recorded by providers other than myself. Time includes review of laboratory data, radiology results, discussion with consultants, and monitoring for potential decompensation.    Frank Buck, DO

## 2025-01-11 NOTE — ASSESSMENT & PLAN NOTE
"Lab Results   Component Value Date    HGBA1C 6.9 (H) 11/05/2024       No results for input(s): \"POCGLU\" in the last 72 hours.    Blood Sugar Average: Last 72 hrs:    Sliding Scale  "

## 2025-01-11 NOTE — ASSESSMENT & PLAN NOTE
Lab Results   Component Value Date    EGFR 6 01/10/2025    EGFR 6 01/09/2025    EGFR 6 01/07/2025    CREATININE 9.05 (H) 01/10/2025    CREATININE 9.37 (H) 01/09/2025    CREATININE 8.77 (H) 01/07/2025      Kidney function baseline appears to fluctuate between 7 and 10.   Hemodialysis Monday Wednesday Fridays   Nephrology consulted

## 2025-01-11 NOTE — PROGRESS NOTES
Progress Note - Critical Care/ICU   Name: Mateus Mckeon 39 y.o. male I MRN: [id number]  Unit/Bed#: ICU 15 I Date of Admission: 1/9/2025   Date of Service: 1/11/2025 I Hospital Day: 1      Critical Care Interval Transfer Note:    Brief Hospital Summary: Admitted to hospital for HTN urgency, on floors with rising BP. Patient was transferred to ICU for Cardene gtt. Nephrology following, increased nifedipine and Cardura doses. Patient remained stable off Cardene gtt and was transferred to med-surg.     Barriers to discharge:   Persistent BP control     Consults: IP CONSULT TO NEPHROLOGY    Recommended to review admission imaging for incidental findings and document in discharge navigator: Chart reviewed, no known incidental findings noted at this time.      Discharge Plan: Anticipate discharge in 24-48 hrs to home.       Patient seen and evaluated by Critical Care today and deemed to be appropriate for transfer to Med Surg. Spoke to Dr. Vieira  from [facility] to accept transfer. Critical care can be contacted via SecureChat with any questions or concerns. Please use the Critical Care AP Role in Secure Chat for any provider inquires until the patient is transferred out of the ICU or until tomorrow at 0600.

## 2025-01-12 LAB
ANION GAP SERPL CALCULATED.3IONS-SCNC: 10 MMOL/L (ref 4–13)
BUN SERPL-MCNC: 31 MG/DL (ref 5–25)
CALCIUM SERPL-MCNC: 8.2 MG/DL (ref 8.4–10.2)
CHLORIDE SERPL-SCNC: 98 MMOL/L (ref 96–108)
CO2 SERPL-SCNC: 29 MMOL/L (ref 21–32)
CREAT SERPL-MCNC: 10.26 MG/DL (ref 0.6–1.3)
ERYTHROCYTE [DISTWIDTH] IN BLOOD BY AUTOMATED COUNT: 17 % (ref 11.6–15.1)
FERRITIN SERPL-MCNC: 264 NG/ML (ref 24–336)
FOLATE SERPL-MCNC: 16.4 NG/ML
GFR SERPL CREATININE-BSD FRML MDRD: 5 ML/MIN/1.73SQ M
GLUCOSE SERPL-MCNC: 154 MG/DL (ref 65–140)
GLUCOSE SERPL-MCNC: 179 MG/DL (ref 65–140)
GLUCOSE SERPL-MCNC: 196 MG/DL (ref 65–140)
GLUCOSE SERPL-MCNC: 216 MG/DL (ref 65–140)
GLUCOSE SERPL-MCNC: 273 MG/DL (ref 65–140)
HCT VFR BLD AUTO: 34.7 % (ref 36.5–49.3)
HGB BLD-MCNC: 11.2 G/DL (ref 12–17)
IRON SATN MFR SERPL: 36 % (ref 15–50)
IRON SERPL-MCNC: 54 UG/DL (ref 50–212)
MCH RBC QN AUTO: 32.5 PG (ref 26.8–34.3)
MCHC RBC AUTO-ENTMCNC: 32.3 G/DL (ref 31.4–37.4)
MCV RBC AUTO: 101 FL (ref 82–98)
MRSA NOSE QL CULT: NORMAL
PLATELET # BLD AUTO: 219 THOUSANDS/UL (ref 149–390)
PMV BLD AUTO: 9.9 FL (ref 8.9–12.7)
POTASSIUM SERPL-SCNC: 5.1 MMOL/L (ref 3.5–5.3)
RBC # BLD AUTO: 3.45 MILLION/UL (ref 3.88–5.62)
SODIUM SERPL-SCNC: 137 MMOL/L (ref 135–147)
TIBC SERPL-MCNC: 151.2 UG/DL (ref 250–450)
TRANSFERRIN SERPL-MCNC: 108 MG/DL (ref 203–362)
UIBC SERPL-MCNC: 97 UG/DL (ref 155–355)
VIT B12 SERPL-MCNC: 500 PG/ML (ref 180–914)
WBC # BLD AUTO: 6.22 THOUSAND/UL (ref 4.31–10.16)

## 2025-01-12 PROCEDURE — 99232 SBSQ HOSP IP/OBS MODERATE 35: CPT | Performed by: STUDENT IN AN ORGANIZED HEALTH CARE EDUCATION/TRAINING PROGRAM

## 2025-01-12 PROCEDURE — 83540 ASSAY OF IRON: CPT | Performed by: STUDENT IN AN ORGANIZED HEALTH CARE EDUCATION/TRAINING PROGRAM

## 2025-01-12 PROCEDURE — 82728 ASSAY OF FERRITIN: CPT | Performed by: STUDENT IN AN ORGANIZED HEALTH CARE EDUCATION/TRAINING PROGRAM

## 2025-01-12 PROCEDURE — 85027 COMPLETE CBC AUTOMATED: CPT

## 2025-01-12 PROCEDURE — 82948 REAGENT STRIP/BLOOD GLUCOSE: CPT

## 2025-01-12 PROCEDURE — 83550 IRON BINDING TEST: CPT | Performed by: STUDENT IN AN ORGANIZED HEALTH CARE EDUCATION/TRAINING PROGRAM

## 2025-01-12 PROCEDURE — 80048 BASIC METABOLIC PNL TOTAL CA: CPT

## 2025-01-12 PROCEDURE — 82746 ASSAY OF FOLIC ACID SERUM: CPT | Performed by: STUDENT IN AN ORGANIZED HEALTH CARE EDUCATION/TRAINING PROGRAM

## 2025-01-12 PROCEDURE — 82607 VITAMIN B-12: CPT | Performed by: STUDENT IN AN ORGANIZED HEALTH CARE EDUCATION/TRAINING PROGRAM

## 2025-01-12 RX ADMIN — CHLORHEXIDINE GLUCONATE 15 ML: 1.2 RINSE ORAL at 21:17

## 2025-01-12 RX ADMIN — ATORVASTATIN CALCIUM 40 MG: 40 TABLET, FILM COATED ORAL at 17:26

## 2025-01-12 RX ADMIN — HYDRALAZINE HYDROCHLORIDE 100 MG: 25 TABLET ORAL at 21:15

## 2025-01-12 RX ADMIN — GABAPENTIN 200 MG: 100 CAPSULE ORAL at 21:16

## 2025-01-12 RX ADMIN — LABETALOL HYDROCHLORIDE 400 MG: 200 TABLET, FILM COATED ORAL at 13:58

## 2025-01-12 RX ADMIN — FAMOTIDINE 10 MG: 20 TABLET, FILM COATED ORAL at 08:44

## 2025-01-12 RX ADMIN — CALCIUM ACETATE 667 MG: 667 CAPSULE ORAL at 08:44

## 2025-01-12 RX ADMIN — ASPIRIN 81 MG: 81 TABLET, COATED ORAL at 08:44

## 2025-01-12 RX ADMIN — HYDRALAZINE HYDROCHLORIDE 100 MG: 25 TABLET ORAL at 13:58

## 2025-01-12 RX ADMIN — CINACALCET 60 MG: 30 TABLET ORAL at 08:44

## 2025-01-12 RX ADMIN — TRAZODONE HYDROCHLORIDE 50 MG: 50 TABLET ORAL at 22:30

## 2025-01-12 RX ADMIN — CLONIDINE HYDROCHLORIDE 0.2 MG: 0.1 TABLET ORAL at 06:08

## 2025-01-12 RX ADMIN — Medication 1 CAPSULE: at 11:37

## 2025-01-12 RX ADMIN — CALCIUM ACETATE 667 MG: 667 CAPSULE ORAL at 17:26

## 2025-01-12 RX ADMIN — LOSARTAN POTASSIUM 100 MG: 50 TABLET, FILM COATED ORAL at 21:15

## 2025-01-12 RX ADMIN — TORSEMIDE 100 MG: 20 TABLET ORAL at 06:07

## 2025-01-12 RX ADMIN — LABETALOL HYDROCHLORIDE 400 MG: 200 TABLET, FILM COATED ORAL at 21:14

## 2025-01-12 RX ADMIN — CLONIDINE HYDROCHLORIDE 0.2 MG: 0.1 TABLET ORAL at 13:58

## 2025-01-12 RX ADMIN — CHLORHEXIDINE GLUCONATE 15 ML: 1.2 RINSE ORAL at 08:44

## 2025-01-12 RX ADMIN — HYDRALAZINE HYDROCHLORIDE 100 MG: 25 TABLET ORAL at 06:07

## 2025-01-12 RX ADMIN — NIFEDIPINE 90 MG: 30 TABLET, FILM COATED, EXTENDED RELEASE ORAL at 08:44

## 2025-01-12 RX ADMIN — NIFEDIPINE 90 MG: 30 TABLET, FILM COATED, EXTENDED RELEASE ORAL at 21:14

## 2025-01-12 RX ADMIN — ESCITALOPRAM OXALATE 20 MG: 20 TABLET ORAL at 08:44

## 2025-01-12 RX ADMIN — LABETALOL HYDROCHLORIDE 400 MG: 200 TABLET, FILM COATED ORAL at 06:07

## 2025-01-12 RX ADMIN — DOXAZOSIN 4 MG: 4 TABLET ORAL at 06:07

## 2025-01-12 RX ADMIN — CLONIDINE HYDROCHLORIDE 0.2 MG: 0.1 TABLET ORAL at 21:15

## 2025-01-12 RX ADMIN — CALCIUM ACETATE 667 MG: 667 CAPSULE ORAL at 21:18

## 2025-01-12 RX ADMIN — GABAPENTIN 100 MG: 100 CAPSULE ORAL at 08:44

## 2025-01-12 NOTE — PLAN OF CARE
Problem: Potential for Falls  Goal: Patient will remain free of falls  Description: INTERVENTIONS:  - Educate patient/family on patient safety including physical limitations  - Instruct patient to call for assistance with activity   - Consult OT/PT to assist with strengthening/mobility   - Keep Call bell within reach  - Keep bed low and locked with side rails adjusted as appropriate  - Keep care items and personal belongings within reach  - Initiate and maintain comfort rounds  - Make Fall Risk Sign visible to staff  - Offer Toileting every 2 Hours, in advance of need    - Apply yellow socks and bracelet for high fall risk patients  - Consider moving patient to room near nurses station  1/11/2025 2305 by Aisha Galaviz RN  Outcome: Progressing  1/11/2025 2039 by Aisha Galaviz RN  Outcome: Progressing     Problem: METABOLIC, FLUID AND ELECTROLYTES - ADULT  Goal: Electrolytes maintained within normal limits  Description: INTERVENTIONS:  - Monitor labs and assess patient for signs and symptoms of electrolyte imbalances  - Administer electrolyte replacement as ordered  - Monitor response to electrolyte replacements, including repeat lab results as appropriate  - Instruct patient on fluid and nutrition as appropriate  1/11/2025 2305 by Aisha Galaviz RN  Outcome: Progressing  1/11/2025 2039 by Aisha Galaviz RN  Outcome: Progressing  Goal: Fluid balance maintained  Description: INTERVENTIONS:  - Monitor labs   - Monitor I/O and WT  - Instruct patient on fluid and nutrition as appropriate  - Assess for signs & symptoms of volume excess or deficit  1/11/2025 2305 by Aisha Galaviz RN  Outcome: Progressing  1/11/2025 2039 by Aisha Galaviz RN  Outcome: Progressing     Problem: PAIN - ADULT  Goal: Verbalizes/displays adequate comfort level or baseline comfort level  Description: Interventions:  - Encourage patient to monitor pain and request assistance  - Assess pain using appropriate pain scale  - Administer analgesics based on type and  severity of pain and evaluate response  - Implement non-pharmacological measures as appropriate and evaluate response  - Consider cultural and social influences on pain and pain management  - Notify physician/advanced practitioner if interventions unsuccessful or patient reports new pain  1/11/2025 2305 by Aisha Galaviz RN  Outcome: Progressing  1/11/2025 2039 by Aisha Galaviz RN  Outcome: Progressing     Problem: INFECTION - ADULT  Goal: Absence or prevention of progression during hospitalization  Description: INTERVENTIONS:  - Assess and monitor for signs and symptoms of infection  - Monitor lab/diagnostic results  - Monitor all insertion sites, i.e. indwelling lines, tubes, and drains  - Monitor endotracheal if appropriate and nasal secretions for changes in amount and color  - [place] appropriate cooling/warming therapies per order  - Administer medications as ordered  - Instruct and encourage patient and family to use good hand hygiene technique  - Identify and instruct in appropriate isolation precautions for identified infection/condition  1/11/2025 2305 by Aisha Galaviz RN  Outcome: Progressing  1/11/2025 2039 by Aisha Galaviz RN  Outcome: Progressing  Goal: Absence of fever/infection during neutropenic period  Description: INTERVENTIONS:  - Monitor WBC    1/11/2025 2305 by Aisha Galaviz RN  Outcome: Progressing  1/11/2025 2039 by Aisha Galaviz RN  Outcome: Progressing     Problem: SAFETY ADULT  Goal: Patient will remain free of falls  Description: INTERVENTIONS:  - Educate patient/family on patient safety including physical limitations  - Instruct patient to call for assistance with activity   - Consult OT/PT to assist with strengthening/mobility   - Keep Call bell within reach  - Keep bed low and locked with side rails adjusted as appropriate  - Keep care items and personal belongings within reach  - Initiate and maintain comfort rounds  - Make Fall Risk Sign visible to staff    - Apply yellow socks and bracelet for  high fall risk patients  - Consider moving patient to room near nurses station  1/11/2025 2305 by Aisha Galaviz RN  Outcome: Progressing  1/11/2025 2039 by Aisha Galaviz RN  Outcome: Progressing  Goal: Maintain or return to baseline ADL function  Description: INTERVENTIONS:  -  Assess patient's ability to carry out ADLs; assess patient's baseline for ADL function and identify physical deficits which impact ability to perform ADLs (bathing, care of mouth/teeth, toileting, grooming, dressing, etc.)  - Assess/evaluate cause of self-care deficits   - Assess range of motion  - Assess patient's mobility; develop plan if impaired  - Assess patient's need for assistive devices and provide as appropriate  - Encourage maximum independence but intervene and supervise when necessary  - Involve family in performance of ADLs  - Assess for home care needs following discharge   - Consider OT consult to assist with ADL evaluation and planning for discharge  - Provide patient education as appropriate  1/11/2025 2305 by Aisha Galaviz RN  Outcome: Progressing  1/11/2025 2039 by Aisha Galaviz RN  Outcome: Progressing  Goal: Maintains/Returns to pre admission functional level  Description: INTERVENTIONS:  - Perform AM-PAC 6 Click Basic Mobility/ Daily Activity assessment daily.  - Set and communicate daily mobility goal to care team and patient/family/caregiver.   - Collaborate with rehabilitation services on mobility goals if consulted  - Perform Range of Motion 3 times a day.  - Reposition patient every 2 hours.  - Dangle patient 3 times a day  - Stand patient 3 times a day  - Ambulate patient 3 times a day  - Out of bed to chair 3 times a day   - Out of bed for meals 3 times a day  - Out of bed for toileting  - Record patient progress and toleration of activity level   1/11/2025 2305 by Aisha Galaviz RN  Outcome: Progressing  1/11/2025 2039 by Aisha Galaviz RN  Outcome: Progressing     Problem: DISCHARGE PLANNING  Goal: Discharge to home or other  facility with appropriate resources  Description: INTERVENTIONS:  - Identify barriers to discharge w/patient and caregiver  - Arrange for needed discharge resources and transportation as appropriate  - Identify discharge learning needs (meds, wound care, etc.)  - Arrange for interpretive services to assist at discharge as needed  - Refer to Case Management Department for coordinating discharge planning if the patient needs post-hospital services based on physician/advanced practitioner order or complex needs related to functional status, cognitive ability, or social support system  1/11/2025 2305 by Aisha Galaviz RN  Outcome: Progressing  1/11/2025 2039 by Aisha Galaviz RN  Outcome: Progressing     Problem: Knowledge Deficit  Goal: Patient/family/caregiver demonstrates understanding of disease process, treatment plan, medications, and discharge instructions  Description: Complete learning assessment and assess knowledge base.  Interventions:  - Provide teaching at level of understanding  - Provide teaching via preferred learning methods  1/11/2025 2305 by Aisha Galaviz RN  Outcome: Progressing  1/11/2025 2039 by Aisha Galaviz RN  Outcome: Progressing

## 2025-01-12 NOTE — ASSESSMENT & PLAN NOTE
Lab Results   Component Value Date    HGBA1C 6.9 (H) 11/05/2024       Recent Labs     01/11/25  1128 01/11/25 2058 01/12/25  0725 01/12/25  1145   POCGLU 163* 266* 216* 273*     On insulin pump  Continue to trend blood sugars.  Endo consulted to help with glucose management

## 2025-01-12 NOTE — PROGRESS NOTES
Progress Note - Hospitalist   Name: Mateus Mckeon 39 y.o. male I MRN: [id number]  Unit/Bed#: [facility]-01 I Date of Admission: 1/9/2025   Date of Service: 1/12/2025 I Hospital Day: 2    Assessment & Plan  Hypertensive urgency  Patient has history of hypertension with end-stage renal disease.  Has previously been worked up for secondary causes including pheochromocytoma, renal artery stenosis.  Started on Cardene drip but was weaned off and started on hydralazine 20 mg.  Recently discharged on 1/7 for similar presentation.  Reports compliance with medications as outpatient.  Presented with headache which has improved with blood pressure reduction.  Current blood pressure is at goal, 166/85.  Interestingly blood pressure was 122/70 during his office visit with cardiology on 12/26/2024.  Echo 11/6/2024 showed EF of 65% and G1 DD.     Plan:  Resume his home medications  Cardene 0.2 mg every 12 hours increased to q8h  Doxazosin increased to 4 mg daily  Hydralazine 100 mg every 8 hours  Labetalol 400 mg 3 times daily  Losartan 100 mg daily  Nifedipine 90 mg bid  Continue to monitor blood pressure.  Appreciate nephrology help with blood pressure management  Type 1 diabetes ([facility])  Lab Results   Component Value Date    HGBA1C 6.9 (H) 11/05/2024       Recent Labs     01/11/25  1128 01/11/25  2058 01/12/25  0725 01/12/25  1145   POCGLU 163* 266* 216* 273*     On insulin pump  Continue to trend blood sugars.  Endo consulted to help with glucose management   Anemia of chronic disease  Recent Labs     01/09/25  2259 01/11/25  0519 01/12/25  0459   HGB 11.1* 11.3* 11.2*     Patient receives EPO with dialysis.  Secondary to ESRD.  ESRD (end stage renal disease) ([facility])  Lab Results   Component Value Date    EGFR 5 01/12/2025    EGFR 6 01/10/2025    EGFR 6 01/09/2025    CREATININE 10.26 (H) 01/12/2025    CREATININE 9.05 (H) 01/10/2025    CREATININE 9.37 (H) 01/09/2025     On hemodialysis M,W, F   He is on transplant list and  following with [facility]  Nephrology consulted to facilitate dialysis.  Influenza  Finished course of tamiflu while hospitalized   History of CVA (cerebrovascular accident)  He did have 3 previous CVAs, likely from uncontrolled BP.   2 ischemic strokes, 1 hemorrhagic.  SANJEEV showed no structural heart disease, no thrombus.   He had an implantable loop recorder in 2018 which did not show any atrial fibrillation.   Explanted in Sep 2023   Has baseline mild cognitive deficit.  Has residual right upper extremity weakness.  Thromboembolic disorder (HCC)  Prothrombin gene mutation  Patient's mother reports that he was on an antiplatelet prior but following with hematology and they recommended to stay only on baby aspirin   Secondary hyperparathyroidism of renal origin (HCC)      VTE Pharmacologic Prophylaxis: VTE Score: 2 Low Risk (Score 0-2) - Encourage Ambulation.    Mobility:   Basic Mobility Inpatient Raw Score: 22  JH-HLM Goal: 7: Walk 25 feet or more  JH-HLM Achieved: 8: Walk 250 feet ot more  JH-HLM Goal achieved. Continue to encourage appropriate mobility.    Patient Centered Rounds: I performed bedside rounds with nursing staff today.   Discussions with Specialists or Other Care Team Provider: case management    Education and Discussions with Family / Patient: Updated  (mother) at bedside.    Current Length of Stay: 2 day(s)  Current Patient Status: Inpatient   Certification Statement: The patient will continue to require additional inpatient hospital stay due to blood pressure monitoring  Discharge Plan: Anticipate discharge in 24-48 hrs to home.    Code Status: Level 1 - Full Code    Subjective   Pt states he's feeling well, no acute complaints.    Objective :  Temp:  [97.2 °F (36.2 °C)-97.8 °F (36.6 °C)] 97.2 °F (36.2 °C)  HR:  [65-69] 69  BP: (141-164)/(79-91) 147/80  SpO2:  [95 %-98 %] 97 %  O2 Device: None (Room air)    Body mass index is 27.98 kg/m².     Input and Output Summary (last 24 hours):      Intake/Output Summary (Last 24 hours) at 1/12/2025 1330  Last data filed at 1/12/2025 1300  Gross per 24 hour   Intake 950 ml   Output --   Net 950 ml       Physical Exam  Vitals and nursing note reviewed.   Constitutional:       General: He is not in acute distress.     Appearance: He is well-developed.   HENT:      Head: Normocephalic and atraumatic.   Eyes:      Conjunctiva/sclera: Conjunctivae normal.   Cardiovascular:      Rate and Rhythm: Normal rate and regular rhythm.      Heart sounds: No murmur heard.  Pulmonary:      Effort: Pulmonary effort is normal. No respiratory distress.      Breath sounds: Normal breath sounds.   Abdominal:      Palpations: Abdomen is soft.      Tenderness: There is no abdominal tenderness.   Musculoskeletal:         General: No swelling.   Skin:     General: Skin is warm and dry.   Neurological:      Mental Status: He is alert. Mental status is at baseline.   Psychiatric:         Mood and Affect: Mood normal.           Lines/Drains:              Lab Results: I have reviewed the following results:   Results from last 7 days   Lab Units 01/12/25  0459 01/11/25  0519 01/09/25  2259   WBC Thousand/uL 6.22   < > 5.58   HEMOGLOBIN g/dL 11.2*   < > 11.1*   HEMATOCRIT % 34.7*   < > 33.9*   PLATELETS Thousands/uL 219   < > 274   SEGS PCT %  --   --  53   LYMPHO PCT %  --   --  30   MONO PCT %  --   --  9   EOS PCT %  --   --  6    < > = values in this interval not displayed.     Results from last 7 days   Lab Units 01/12/25  0459 01/10/25  1324 01/09/25  2259   SODIUM mmol/L 137   < > 140   POTASSIUM mmol/L 5.1   < > 4.4   CHLORIDE mmol/L 98   < > 97   CO2 mmol/L 29   < > 34*   BUN mg/dL 31*   < > 19   CREATININE mg/dL 10.26*   < > 9.37*   ANION GAP mmol/L 10   < > 9   CALCIUM mg/dL 8.2*   < > 8.5   ALBUMIN g/dL  --   --  4.0   TOTAL BILIRUBIN mg/dL  --   --  0.53   ALK PHOS U/L  --   --  86   ALT U/L  --   --  11   AST U/L  --   --  20   GLUCOSE RANDOM mg/dL 196*   < > 190*    < > =  values in this interval not displayed.         Results from last 7 days   Lab Units 01/12/25  1145 01/12/25  0725 01/11/25 2058 01/11/25  1128 01/07/25  1106 01/07/25  0709 01/06/25 2059 01/06/25  1605 01/06/25  1139 01/06/25  0839 01/05/25  2134 01/05/25  1733   POC GLUCOSE mg/dl 273* 216* 266* 163* 173* 214* 214* 213* 144* 139 214* 190*               Recent Cultures (last 7 days):         Imaging Results Review: I reviewed radiology reports from this admission including: chest xray.  Other Study Results Review: EKG was reviewed.     Last 24 Hours Medication List:     Current Facility-Administered Medications:     aspirin (ECOTRIN LOW STRENGTH) EC tablet 81 mg, Daily    atorvastatin (LIPITOR) tablet 40 mg, QPM    calcitriol (ROCALTROL) capsule 0.75 mcg, Once per day on Monday Wednesday Friday    calcium acetate (PHOSLO) capsule 667 mg, TID    chlorhexidine (PERIDEX) 0.12 % oral rinse 15 mL, Q12H HALIE    cinacalcet (SENSIPAR) tablet 60 mg, Daily    cloNIDine (CATAPRES) tablet 0.2 mg, Q8H HALIE    doxazosin (CARDURA) tablet 4 mg, Daily    escitalopram (LEXAPRO) tablet 20 mg, Daily    famotidine (PEPCID) tablet 10 mg, Daily    gabapentin (NEURONTIN) capsule 100 mg, Daily **AND** gabapentin (NEURONTIN) capsule 200 mg, HS    hydrALAZINE (APRESOLINE) tablet 100 mg, Q8H HALIE    hydrOXYzine HCL (ATARAX) tablet 10 mg, Q6H PRN    insulin aspart (NovoLOG) FOR PUMP REFILLS 1 Units, Daily PRN    insulin aspart (NovoLOG) FOR PUMP REFILLS 1 Units, Daily PRN    labetalol (NORMODYNE) tablet 400 mg, TID    losartan (COZAAR) tablet 100 mg, HS    niCARdipine (CARDENE) 25 mg (STANDARD CONCENTRATION) in sodium chloride 0.9% 250 mL, Titrated, Last Rate: Stopped (01/11/25 0831)    NIFEdipine (PROCARDIA XL) 24 hr tablet 90 mg, BID    ondansetron (ZOFRAN-ODT) dispersible tablet 4 mg, Q6H PRN    PATIENT MAINTAINED INSULIN PUMP 1 each, Q8H    torsemide (DEMADEX) tablet 100 mg, Daily    traZODone (DESYREL) tablet 50 mg, HS    vit B complex-vit  C-folic acid (Renal Caps) capsule 1 capsule, Daily Before Lunch    Administrative Statements   Today, Patient Was Seen By: Ceferino Polanco DO      **Please Note: This note may have been constructed using a voice recognition system.**

## 2025-01-12 NOTE — ASSESSMENT & PLAN NOTE
Lab Results   Component Value Date    EGFR 5 01/12/2025    EGFR 6 01/10/2025    EGFR 6 01/09/2025    CREATININE 10.26 (H) 01/12/2025    CREATININE 9.05 (H) 01/10/2025    CREATININE 9.37 (H) 01/09/2025     On hemodialysis M,W, F   He is on transplant list and following with [facility]  Nephrology consulted to facilitate dialysis.

## 2025-01-12 NOTE — PLAN OF CARE
Problem: Potential for Falls  Goal: Patient will remain free of falls  Description: INTERVENTIONS:  - Educate patient/family on patient safety including physical limitations  - Instruct patient to call for assistance with activity   - Consult OT/PT to assist with strengthening/mobility   - Keep Call bell within reach  - Keep bed low and locked with side rails adjusted as appropriate  - Keep care items and personal belongings within reach  - Initiate and maintain comfort rounds  - Make Fall Risk Sign visible to staff  - Offer Toileting every 2 Hours, in advance of need      - Apply yellow socks and bracelet for high fall risk patients  - Consider moving patient to room near nurses station  Outcome: Progressing     Problem: METABOLIC, FLUID AND ELECTROLYTES - ADULT  Goal: Electrolytes maintained within normal limits  Description: INTERVENTIONS:  - Monitor labs and assess patient for signs and symptoms of electrolyte imbalances  - Administer electrolyte replacement as ordered  - Monitor response to electrolyte replacements, including repeat lab results as appropriate  - Instruct patient on fluid and nutrition as appropriate  Outcome: Progressing  Goal: Fluid balance maintained  Description: INTERVENTIONS:  - Monitor labs   - Monitor I/O and WT  - Instruct patient on fluid and nutrition as appropriate  - Assess for signs & symptoms of volume excess or deficit  Outcome: Progressing     Problem: PAIN - ADULT  Goal: Verbalizes/displays adequate comfort level or baseline comfort level  Description: Interventions:  - Encourage patient to monitor pain and request assistance  - Assess pain using appropriate pain scale  - Administer analgesics based on type and severity of pain and evaluate response  - Implement non-pharmacological measures as appropriate and evaluate response  - Consider cultural and social influences on pain and pain management  - Notify physician/advanced practitioner if interventions unsuccessful or  patient reports new pain  Outcome: Progressing     Problem: INFECTION - ADULT  Goal: Absence or prevention of progression during hospitalization  Description: INTERVENTIONS:  - Assess and monitor for signs and symptoms of infection  - Monitor lab/diagnostic results  - Monitor all insertion sites, i.e. indwelling lines, tubes, and drains  - Monitor endotracheal if appropriate and nasal secretions for changes in amount and color  - [place] appropriate cooling/warming therapies per order  - Administer medications as ordered  - Instruct and encourage patient and family to use good hand hygiene technique  - Identify and instruct in appropriate isolation precautions for identified infection/condition  Outcome: Progressing  Goal: Absence of fever/infection during neutropenic period  Description: INTERVENTIONS:  - Monitor WBC    Outcome: Progressing     Problem: SAFETY ADULT  Goal: Patient will remain free of falls  Description: INTERVENTIONS:  - Educate patient/family on patient safety including physical limitations  - Instruct patient to call for assistance with activity   - Consult OT/PT to assist with strengthening/mobility   - Keep Call bell within reach  - Keep bed low and locked with side rails adjusted as appropriate  - Keep care items and personal belongings within reach  - Initiate and maintain comfort rounds  - Make Fall Risk Sign visible to staff  - Offer Toileting every 2 Hours, in advance of need  - Initiate/Maintain bed alarm    - Apply yellow socks and bracelet for high fall risk patients  - Consider moving patient to room near nurses station  Outcome: Progressing  Goal: Maintain or return to baseline ADL function  Description: INTERVENTIONS:  -  Assess patient's ability to carry out ADLs; assess patient's baseline for ADL function and identify physical deficits which impact ability to perform ADLs (bathing, care of mouth/teeth, toileting, grooming, dressing, etc.)  - Assess/evaluate cause of self-care  deficits   - Assess range of motion  - Assess patient's mobility; develop plan if impaired  - Assess patient's need for assistive devices and provide as appropriate  - Encourage maximum independence but intervene and supervise when necessary  - Involve family in performance of ADLs  - Assess for home care needs following discharge   - Consider OT consult to assist with ADL evaluation and planning for discharge  - Provide patient education as appropriate  Outcome: Progressing  Goal: Maintains/Returns to pre admission functional level  Description: INTERVENTIONS:  - Perform AM-PAC 6 Click Basic Mobility/ Daily Activity assessment daily.  - Set and communicate daily mobility goal to care team and patient/family/caregiver.   - Collaborate with rehabilitation services on mobility goals if consulted  - Perform Range of Motion 3 times a day.  - Reposition patient every 2 hours.  - Dangle patient 3 times a day  - Stand patient 3 times a day  - Ambulate patient 3 times a day  - Out of bed to chair 3 times a day   - Out of bed for meals 3 times a day  - Out of bed for toileting  - Record patient progress and toleration of activity level   Outcome: Progressing     Problem: DISCHARGE PLANNING  Goal: Discharge to home or other facility with appropriate resources  Description: INTERVENTIONS:  - Identify barriers to discharge w/patient and caregiver  - Arrange for needed discharge resources and transportation as appropriate  - Identify discharge learning needs (meds, wound care, etc.)  - Arrange for interpretive services to assist at discharge as needed  - Refer to Case Management Department for coordinating discharge planning if the patient needs post-hospital services based on physician/advanced practitioner order or complex needs related to functional status, cognitive ability, or social support system  Outcome: Progressing     Problem: Knowledge Deficit  Goal: Patient/family/caregiver demonstrates understanding of disease  process, treatment plan, medications, and discharge instructions  Description: Complete learning assessment and assess knowledge base.  Interventions:  - Provide teaching at level of understanding  - Provide teaching via preferred learning methods  Outcome: Progressing

## 2025-01-12 NOTE — ASSESSMENT & PLAN NOTE
Patient has history of hypertension with end-stage renal disease.  Has previously been worked up for secondary causes including pheochromocytoma, renal artery stenosis.  Started on Cardene drip but was weaned off and started on hydralazine 20 mg.  Recently discharged on 1/7 for similar presentation.  Reports compliance with medications as outpatient.  Presented with headache which has improved with blood pressure reduction.  Current blood pressure is at goal, 166/85.  Interestingly blood pressure was 122/70 during his office visit with cardiology on 12/26/2024.  Echo 11/6/2024 showed EF of 65% and G1 DD.     Plan:  Resume his home medications  Cardene 0.2 mg every 12 hours increased to q8h  Doxazosin increased to 4 mg daily  Hydralazine 100 mg every 8 hours  Labetalol 400 mg 3 times daily  Losartan 100 mg daily  Nifedipine 90 mg bid  Continue to monitor blood pressure.  Appreciate nephrology help with blood pressure management

## 2025-01-12 NOTE — ASSESSMENT & PLAN NOTE
Recent Labs     01/09/25  2259 01/11/25  0519 01/12/25  0459   HGB 11.1* 11.3* 11.2*     Patient receives EPO with dialysis.  Secondary to ESRD.

## 2025-01-12 NOTE — PLAN OF CARE
Problem: Potential for Falls  Goal: Patient will remain free of falls  Description: INTERVENTIONS:  - Educate patient/family on patient safety including physical limitations  - Instruct patient to call for assistance with activity   - Consult OT/PT to assist with strengthening/mobility   - Keep Call bell within reach  - Keep bed low and locked with side rails adjusted as appropriate  - Keep care items and personal belongings within reach  - Initiate and maintain comfort rounds  - Make Fall Risk Sign visible to staff  - Offer Toileting every 2 Hours, in advance of need  - Apply yellow socks and bracelet for high fall risk patients  - Consider moving patient to room near nurses station  Outcome: Progressing     Problem: METABOLIC, FLUID AND ELECTROLYTES - ADULT  Goal: Electrolytes maintained within normal limits  Description: INTERVENTIONS:  - Monitor labs and assess patient for signs and symptoms of electrolyte imbalances  - Administer electrolyte replacement as ordered  - Monitor response to electrolyte replacements, including repeat lab results as appropriate  - Instruct patient on fluid and nutrition as appropriate  Outcome: Progressing  Goal: Fluid balance maintained  Description: INTERVENTIONS:  - Monitor labs   - Monitor I/O and WT  - Instruct patient on fluid and nutrition as appropriate  - Assess for signs & symptoms of volume excess or deficit  Outcome: Progressing     Problem: PAIN - ADULT  Goal: Verbalizes/displays adequate comfort level or baseline comfort level  Description: Interventions:  - Encourage patient to monitor pain and request assistance  - Assess pain using appropriate pain scale  - Administer analgesics based on type and severity of pain and evaluate response  - Implement non-pharmacological measures as appropriate and evaluate response  - Consider cultural and social influences on pain and pain management  - Notify physician/advanced practitioner if interventions unsuccessful or patient  reports new pain  Outcome: Progressing     Problem: INFECTION - ADULT  Goal: Absence or prevention of progression during hospitalization  Description: INTERVENTIONS:  - Assess and monitor for signs and symptoms of infection  - Monitor lab/diagnostic results  - Monitor all insertion sites, i.e. indwelling lines, tubes, and drains  - Monitor endotracheal if appropriate and nasal secretions for changes in amount and color  - New [place] appropriate cooling/warming therapies per order  - Administer medications as ordered  - Instruct and encourage patient and family to use good hand hygiene technique  - Identify and instruct in appropriate isolation precautions for identified infection/condition  Outcome: Progressing  Goal: Absence of fever/infection during neutropenic period  Description: INTERVENTIONS:  - Monitor WBC    Outcome: Progressing     Problem: Knowledge Deficit  Goal: Patient/family/caregiver demonstrates understanding of disease process, treatment plan, medications, and discharge instructions  Description: Complete learning assessment and assess knowledge base.  Interventions:  - Provide teaching at level of understanding  - Provide teaching via preferred learning methods  Outcome: Progressing

## 2025-01-13 ENCOUNTER — APPOINTMENT (INPATIENT)
Dept: DIALYSIS | Facility: HOSPITAL | Age: 40
End: 2025-01-13
Attending: STUDENT IN AN ORGANIZED HEALTH CARE EDUCATION/TRAINING PROGRAM
Payer: MEDICARE

## 2025-01-13 VITALS
OXYGEN SATURATION: 93 % | HEIGHT: 71 IN | HEART RATE: 74 BPM | DIASTOLIC BLOOD PRESSURE: 88 MMHG | WEIGHT: 205.25 LBS | SYSTOLIC BLOOD PRESSURE: 168 MMHG | TEMPERATURE: 98 F | BODY MASS INDEX: 28.73 KG/M2 | RESPIRATION RATE: 16 BRPM

## 2025-01-13 PROBLEM — E87.5 HYPERKALEMIA: Status: ACTIVE | Noted: 2025-01-13

## 2025-01-13 LAB
ALBUMIN SERPL BCG-MCNC: 3.6 G/DL (ref 3.5–5)
ANION GAP SERPL CALCULATED.3IONS-SCNC: 10 MMOL/L (ref 4–13)
ANION GAP SERPL CALCULATED.3IONS-SCNC: 11 MMOL/L (ref 4–13)
BUN SERPL-MCNC: 41 MG/DL (ref 5–25)
BUN SERPL-MCNC: 44 MG/DL (ref 5–25)
CALCIUM SERPL-MCNC: 7.8 MG/DL (ref 8.4–10.2)
CALCIUM SERPL-MCNC: 8 MG/DL (ref 8.4–10.2)
CHLORIDE SERPL-SCNC: 100 MMOL/L (ref 96–108)
CHLORIDE SERPL-SCNC: 99 MMOL/L (ref 96–108)
CO2 SERPL-SCNC: 27 MMOL/L (ref 21–32)
CO2 SERPL-SCNC: 27 MMOL/L (ref 21–32)
CREAT SERPL-MCNC: 10.77 MG/DL (ref 0.6–1.3)
CREAT SERPL-MCNC: 11.76 MG/DL (ref 0.6–1.3)
ERYTHROCYTE [DISTWIDTH] IN BLOOD BY AUTOMATED COUNT: 17 % (ref 11.6–15.1)
GFR SERPL CREATININE-BSD FRML MDRD: 4 ML/MIN/1.73SQ M
GFR SERPL CREATININE-BSD FRML MDRD: 5 ML/MIN/1.73SQ M
GLUCOSE SERPL-MCNC: 140 MG/DL (ref 65–140)
GLUCOSE SERPL-MCNC: 156 MG/DL (ref 65–140)
GLUCOSE SERPL-MCNC: 164 MG/DL (ref 65–140)
GLUCOSE SERPL-MCNC: 207 MG/DL (ref 65–140)
HCT VFR BLD AUTO: 34 % (ref 36.5–49.3)
HGB BLD-MCNC: 11.1 G/DL (ref 12–17)
MAGNESIUM SERPL-MCNC: 2.3 MG/DL (ref 1.9–2.7)
MAGNESIUM SERPL-MCNC: 2.7 MG/DL (ref 1.9–2.7)
MCH RBC QN AUTO: 33.2 PG (ref 26.8–34.3)
MCHC RBC AUTO-ENTMCNC: 32.6 G/DL (ref 31.4–37.4)
MCV RBC AUTO: 102 FL (ref 82–98)
PHOSPHATE SERPL-MCNC: 7.3 MG/DL (ref 2.7–4.5)
PLATELET # BLD AUTO: 218 THOUSANDS/UL (ref 149–390)
PMV BLD AUTO: 10.6 FL (ref 8.9–12.7)
POTASSIUM SERPL-SCNC: 5.1 MMOL/L (ref 3.5–5.3)
POTASSIUM SERPL-SCNC: 5.8 MMOL/L (ref 3.5–5.3)
RBC # BLD AUTO: 3.34 MILLION/UL (ref 3.88–5.62)
SODIUM SERPL-SCNC: 137 MMOL/L (ref 135–147)
SODIUM SERPL-SCNC: 137 MMOL/L (ref 135–147)
WBC # BLD AUTO: 6.61 THOUSAND/UL (ref 4.31–10.16)

## 2025-01-13 PROCEDURE — 83735 ASSAY OF MAGNESIUM: CPT | Performed by: STUDENT IN AN ORGANIZED HEALTH CARE EDUCATION/TRAINING PROGRAM

## 2025-01-13 PROCEDURE — 80069 RENAL FUNCTION PANEL: CPT | Performed by: STUDENT IN AN ORGANIZED HEALTH CARE EDUCATION/TRAINING PROGRAM

## 2025-01-13 PROCEDURE — 82948 REAGENT STRIP/BLOOD GLUCOSE: CPT

## 2025-01-13 PROCEDURE — NC001 PR NO CHARGE: Performed by: STUDENT IN AN ORGANIZED HEALTH CARE EDUCATION/TRAINING PROGRAM

## 2025-01-13 PROCEDURE — 5A1D70Z PERFORMANCE OF URINARY FILTRATION, INTERMITTENT, LESS THAN 6 HOURS PER DAY: ICD-10-PCS | Performed by: STUDENT IN AN ORGANIZED HEALTH CARE EDUCATION/TRAINING PROGRAM

## 2025-01-13 PROCEDURE — 83735 ASSAY OF MAGNESIUM: CPT

## 2025-01-13 PROCEDURE — 80048 BASIC METABOLIC PNL TOTAL CA: CPT

## 2025-01-13 PROCEDURE — 83918 ORGANIC ACIDS TOTAL QUANT: CPT | Performed by: STUDENT IN AN ORGANIZED HEALTH CARE EDUCATION/TRAINING PROGRAM

## 2025-01-13 PROCEDURE — 85027 COMPLETE CBC AUTOMATED: CPT | Performed by: STUDENT IN AN ORGANIZED HEALTH CARE EDUCATION/TRAINING PROGRAM

## 2025-01-13 PROCEDURE — 93005 ELECTROCARDIOGRAM TRACING: CPT

## 2025-01-13 PROCEDURE — 99239 HOSP IP/OBS DSCHRG MGMT >30: CPT | Performed by: STUDENT IN AN ORGANIZED HEALTH CARE EDUCATION/TRAINING PROGRAM

## 2025-01-13 PROCEDURE — 99222 1ST HOSP IP/OBS MODERATE 55: CPT | Performed by: INTERNAL MEDICINE

## 2025-01-13 RX ORDER — CLONIDINE HYDROCHLORIDE 0.2 MG/1
0.2 TABLET ORAL EVERY 8 HOURS SCHEDULED
Qty: 90 TABLET | Refills: 0 | Status: SHIPPED | OUTPATIENT
Start: 2025-01-13

## 2025-01-13 RX ORDER — NIFEDIPINE 90 MG/1
90 TABLET, EXTENDED RELEASE ORAL 2 TIMES DAILY
Qty: 60 TABLET | Refills: 0 | Status: SHIPPED | OUTPATIENT
Start: 2025-01-13

## 2025-01-13 RX ORDER — DOXAZOSIN 4 MG/1
4 TABLET ORAL DAILY
Qty: 30 TABLET | Refills: 0 | Status: SHIPPED | OUTPATIENT
Start: 2025-01-14

## 2025-01-13 RX ADMIN — CLONIDINE HYDROCHLORIDE 0.2 MG: 0.1 TABLET ORAL at 05:16

## 2025-01-13 RX ADMIN — CALCIUM ACETATE 667 MG: 667 CAPSULE ORAL at 11:37

## 2025-01-13 RX ADMIN — CLONIDINE HYDROCHLORIDE 0.2 MG: 0.1 TABLET ORAL at 15:10

## 2025-01-13 RX ADMIN — LABETALOL HYDROCHLORIDE 400 MG: 200 TABLET, FILM COATED ORAL at 05:15

## 2025-01-13 RX ADMIN — Medication 1 CAPSULE: at 11:42

## 2025-01-13 RX ADMIN — CALCITRIOL 0.75 MCG: 0.25 CAPSULE, LIQUID FILLED ORAL at 11:36

## 2025-01-13 RX ADMIN — HYDRALAZINE HYDROCHLORIDE 100 MG: 25 TABLET ORAL at 05:16

## 2025-01-13 RX ADMIN — GABAPENTIN 100 MG: 100 CAPSULE ORAL at 11:37

## 2025-01-13 RX ADMIN — CINACALCET 60 MG: 30 TABLET ORAL at 11:37

## 2025-01-13 RX ADMIN — CHLORHEXIDINE GLUCONATE 15 ML: 1.2 RINSE ORAL at 11:37

## 2025-01-13 RX ADMIN — TORSEMIDE 100 MG: 20 TABLET ORAL at 05:15

## 2025-01-13 RX ADMIN — DOXAZOSIN 4 MG: 4 TABLET ORAL at 05:16

## 2025-01-13 RX ADMIN — ASPIRIN 81 MG: 81 TABLET, COATED ORAL at 11:36

## 2025-01-13 RX ADMIN — HYDRALAZINE HYDROCHLORIDE 100 MG: 25 TABLET ORAL at 15:10

## 2025-01-13 RX ADMIN — NIFEDIPINE 90 MG: 30 TABLET, FILM COATED, EXTENDED RELEASE ORAL at 11:36

## 2025-01-13 RX ADMIN — ESCITALOPRAM OXALATE 20 MG: 20 TABLET ORAL at 11:37

## 2025-01-13 RX ADMIN — LABETALOL HYDROCHLORIDE 400 MG: 200 TABLET, FILM COATED ORAL at 15:10

## 2025-01-13 RX ADMIN — FAMOTIDINE 10 MG: 20 TABLET, FILM COATED ORAL at 11:37

## 2025-01-13 NOTE — ASSESSMENT & PLAN NOTE
Recent Labs     01/11/25  0519 01/12/25  0459 01/13/25  0453   HGB 11.3* 11.2* 11.1*     Patient receives EPO with dialysis.  Secondary to ESRD.

## 2025-01-13 NOTE — DISCHARGE INSTR - AVS FIRST PAGE
Dear Mateus Mckeon,     It was our pleasure to care for you here at [facility].  It is our hope that we were always able to exceed the expected standards for your care during your stay.  You were hospitalized due to Hypertensive Urgency.  You were cared for on the third floor by Yaneli Phillips MD under the service of Ceferino Polanco DO with the [facility] who covers for your primary care physician (PCP), Dania Sher DO, while you were hospitalized.  If you have any questions or concerns related to this hospitalization, you may contact us at .  For follow up as well as any medication refills, we recommend that you follow up with your primary care physician.  A registered nurse will reach out to you by phone within a few days after your discharge to answer any additional questions that you may have after going home.  However, at this time we provide for you here, the most important instructions / recommendations at discharge:       Notable Medication Adjustments -   Please CHANGE your Clonidine (Catapres) to 0.2 mg every 8 hours.   Please CHANGE your Doxazosin (Cardura) to 4 mg once a day.   Please CHANGE your Nifedipine (Procardia) to 90 mg twice a day.     Testing Required after Discharge -   none  ** Please contact your PCP to request testing orders for any of the testing recommended here **    Important follow up information -   Please follow up with your PCP outpatient.   Please follow up with your Nephrologist outpatient.   Please follow up with your Endocrinologist outpatient.     Other Instructions -   OmniPod 5 insulin pump with Dexcom G6 CGM and NovoLog insulin. Current settings are as follows:  Basal rate:   MN-6a: 0.5 u/hr  6a-MN: 0.65 u/hr  ICR: 16  ISF: 50  Target blood glucose: 120   AIT: 4 hours     Please review this entire after visit summary as additional general instructions including medication list,  appointments, activity, diet, any pertinent wound care, and other additional recommendations from your care team that may be provided for you.      Sincerely,     Yaneli Phillips MD

## 2025-01-13 NOTE — ASSESSMENT & PLAN NOTE
Recent Labs     01/11/25  0519 01/12/25  0459   HGB 11.3* 11.2*     Patient receives EPO with dialysis.  Secondary to ESRD.

## 2025-01-13 NOTE — PLAN OF CARE
Problem: Potential for Falls  Goal: Patient will remain free of falls  Description: INTERVENTIONS:  - Educate patient/family on patient safety including physical limitations  - Instruct patient to call for assistance with activity   - Consult OT/PT to assist with strengthening/mobility   - Keep Call bell within reach  - Keep bed low and locked with side rails adjusted as appropriate  - Keep care items and personal belongings within reach  - Initiate and maintain comfort rounds  - Make Fall Risk Sign visible to staff  - Apply yellow socks and bracelet for high fall risk patients  - Consider moving patient to room near nurses station  Outcome: Completed     Problem: METABOLIC, FLUID AND ELECTROLYTES - ADULT  Goal: Electrolytes maintained within normal limits  Description: INTERVENTIONS:  - Monitor labs and assess patient for signs and symptoms of electrolyte imbalances  - Administer electrolyte replacement as ordered  - Monitor response to electrolyte replacements, including repeat lab results as appropriate  - Instruct patient on fluid and nutrition as appropriate  Outcome: Completed  Goal: Fluid balance maintained  Description: INTERVENTIONS:  - Monitor labs   - Monitor I/O and WT  - Instruct patient on fluid and nutrition as appropriate  - Assess for signs & symptoms of volume excess or deficit  Outcome: Completed     Problem: PAIN - ADULT  Goal: Verbalizes/displays adequate comfort level or baseline comfort level  Description: Interventions:  - Encourage patient to monitor pain and request assistance  - Assess pain using appropriate pain scale  - Administer analgesics based on type and severity of pain and evaluate response  - Implement non-pharmacological measures as appropriate and evaluate response  - Consider cultural and social influences on pain and pain management  - Notify physician/advanced practitioner if interventions unsuccessful or patient reports new pain  Outcome: Completed     Problem: INFECTION  - ADULT  Goal: Absence or prevention of progression during hospitalization  Description: INTERVENTIONS:  - Assess and monitor for signs and symptoms of infection  - Monitor lab/diagnostic results  - Monitor all insertion sites, i.e. indwelling lines, tubes, and drains  - Monitor endotracheal if appropriate and nasal secretions for changes in amount and color  - [place] appropriate cooling/warming therapies per order  - Administer medications as ordered  - Instruct and encourage patient and family to use good hand hygiene technique  - Identify and instruct in appropriate isolation precautions for identified infection/condition  Outcome: Completed  Goal: Absence of fever/infection during neutropenic period  Description: INTERVENTIONS:  - Monitor WBC    Outcome: Completed     Problem: SAFETY ADULT  Goal: Patient will remain free of falls  Description: INTERVENTIONS:  - Educate patient/family on patient safety including physical limitations  - Instruct patient to call for assistance with activity   - Consult OT/PT to assist with strengthening/mobility   - Keep Call bell within reach  - Keep bed low and locked with side rails adjusted as appropriate  - Keep care items and personal belongings within reach  - Initiate and maintain comfort rounds  - Make Fall Risk Sign visible to staff  - Apply yellow socks and bracelet for high fall risk patients  - Consider moving patient to room near nurses station  Outcome: Completed  Goal: Maintain or return to baseline ADL function  Description: INTERVENTIONS:  -  Assess patient's ability to carry out ADLs; assess patient's baseline for ADL function and identify physical deficits which impact ability to perform ADLs (bathing, care of mouth/teeth, toileting, grooming, dressing, etc.)  - Assess/evaluate cause of self-care deficits   - Assess range of motion  - Assess patient's mobility; develop plan if impaired  - Assess patient's need for assistive devices and provide as  appropriate  - Encourage maximum independence but intervene and supervise when necessary  - Involve family in performance of ADLs  - Assess for home care needs following discharge   - Consider OT consult to assist with ADL evaluation and planning for discharge  - Provide patient education as appropriate  Outcome: Completed  Goal: Maintains/Returns to pre admission functional level  Description: INTERVENTIONS:  - Perform AM-PAC 6 Click Basic Mobility/ Daily Activity assessment daily.  - Set and communicate daily mobility goal to care team and patient/family/caregiver.   - Collaborate with rehabilitation services on mobility goals if consulted  - Out of bed for toileting  - Record patient progress and toleration of activity level   Outcome: Completed     Problem: DISCHARGE PLANNING  Goal: Discharge to home or other facility with appropriate resources  Description: INTERVENTIONS:  - Identify barriers to discharge w/patient and caregiver  - Arrange for needed discharge resources and transportation as appropriate  - Identify discharge learning needs (meds, wound care, etc.)  - Arrange for interpretive services to assist at discharge as needed  - Refer to Case Management Department for coordinating discharge planning if the patient needs post-hospital services based on physician/advanced practitioner order or complex needs related to functional status, cognitive ability, or social support system  Outcome: Completed     Problem: Knowledge Deficit  Goal: Patient/family/caregiver demonstrates understanding of disease process, treatment plan, medications, and discharge instructions  Description: Complete learning assessment and assess knowledge base.  Interventions:  - Provide teaching at level of understanding  - Provide teaching via preferred learning methods  Outcome: Completed

## 2025-01-13 NOTE — PROGRESS NOTES
Progress Note - Hospitalist   Name: Mateus Mckeon 39 y.o. male I MRN: [id number]  Unit/Bed#: [facility]-01 I Date of Admission: 1/9/2025   Date of Service: 1/13/2025 I Hospital Day: 3    Assessment & Plan  Hypertensive urgency  Patient has history of hypertension with end-stage renal disease.  Has previously been worked up for secondary causes including pheochromocytoma, renal artery stenosis.  Started on Cardene drip but was weaned off and started on hydralazine 20 mg.  Recently discharged on 1/7 for similar presentation.  Reports compliance with medications as outpatient.  Presented with headache which has improved with blood pressure reduction.  Current blood pressure is at goal, 166/85.  Interestingly blood pressure was 122/70 during his office visit with cardiology on 12/26/2024.  Echo 11/6/2024 showed EF of 65% and G1 DD.     Plan:  Resume his home medications  Cardene 0.2 mg every 12 hours increased to q8h  Doxazosin increased to 4 mg daily  Hydralazine 100 mg every 8 hours  Labetalol 400 mg 3 times daily  Losartan 100 mg daily  Nifedipine 90 mg bid  Continue to monitor blood pressure.  Appreciate nephrology help with blood pressure management  Type 1 diabetes ([facility])  Lab Results   Component Value Date    HGBA1C 6.9 (H) 11/05/2024       Recent Labs     01/12/25  1145 01/12/25  1601 01/12/25  2229 01/13/25  0511   POCGLU 273* 179* 154* 164*     On insulin pump  Continue to trend blood sugars, blood sugars appear to be improving  Anemia of chronic disease  Recent Labs     01/11/25  0519 01/12/25  0459   HGB 11.3* 11.2*     Patient receives EPO with dialysis.  Secondary to ESRD.  ESRD (end stage renal disease) ([facility])  Lab Results   Component Value Date    EGFR 5 01/12/2025    EGFR 6 01/10/2025    EGFR 6 01/09/2025    CREATININE 10.26 (H) 01/12/2025    CREATININE 9.05 (H) 01/10/2025    CREATININE 9.37 (H) 01/09/2025     On hemodialysis M,W, F   He is on transplant list and following with [facility]  Continue to  monitor electrolytes and volume status  Nephrology consulted to facilitate dialysis.  Influenza  Finished course of tamiflu while hospitalized   Patient is currently asymptomatic  History of CVA (cerebrovascular accident)  He did have 3 previous CVAs, likely from uncontrolled BP.   2 ischemic strokes, 1 hemorrhagic.  SANJEEV showed no structural heart disease, no thrombus.   He had an implantable loop recorder in 2018 which did not show any atrial fibrillation.   Explanted in Sep 2023   Has baseline mild cognitive deficit.  Has residual right upper extremity weakness.  Thromboembolic disorder (HCC)  Prothrombin gene mutation  Patient's mother reports that he was on an antiplatelet prior but following with hematology and they recommended to stay only on baby aspirin   Hyperkalemia  Recent Labs     01/12/25  0459 01/13/25  0453 01/13/25  0803   K 5.1 5.8* 5.1   MG  --  2.7  --      Patient ECG looks unchanged from ECG on admission  Current medications that may contribute: losartan    Plan:  Dialysis scheduled for today  Continue to monitor with daily labs  Start telemetry      VTE Pharmacologic Prophylaxis: VTE Score: 2 Low Risk (Score 0-2) - Encourage Ambulation.    Mobility:   Basic Mobility Inpatient Raw Score: 22  JH-HLM Goal: 7: Walk 25 feet or more  JH-HLM Achieved: 8: Walk 250 feet ot more  JH-HLM Goal achieved. Continue to encourage appropriate mobility.    Patient Centered Rounds: I performed bedside rounds with nursing staff today.   Discussions with Specialists or Other Care Team Provider: Dr. Collin CM    Education and Discussions with Family / Patient: Will attempt to call .    Current Length of Stay: 3 day(s)  Current Patient Status: Inpatient   Certification Statement: The patient will continue to require additional inpatient hospital stay due to ongoing medical management. Electrolyte abnormalities.  Discharge Plan: Anticipate discharge in 24-48 hrs to home.    Code Status: Level 1 - Full  Code    Subjective   Patient evaluated at bedside this morning. No acute overnight events reported. Patient reports that he is overall feeling well. No CP, SOB, visual changes, or abdominal pain. He is tolerating oral intake well and is resting comfortable at the time of this encounter.    Objective :  Temp:  [97.2 °F (36.2 °C)-98.2 °F (36.8 °C)] 98.2 °F (36.8 °C)  HR:  [69-75] 75  BP: (138-157)/(77-86) 147/85  Resp:  [18] 18  SpO2:  [94 %-98 %] 94 %  O2 Device: None (Room air)    Body mass index is 28.63 kg/m².     Input and Output Summary (last 24 hours):     Intake/Output Summary (Last 24 hours) at 1/13/2025 0535  Last data filed at 1/12/2025 2106  Gross per 24 hour   Intake 390 ml   Output 0 ml   Net 390 ml       Physical Exam  Vitals reviewed.   Constitutional:       General: He is not in acute distress.     Appearance: He is not toxic-appearing or diaphoretic.   HENT:      Mouth/Throat:      Mouth: Mucous membranes are moist.      Pharynx: Oropharynx is clear.   Cardiovascular:      Rate and Rhythm: Normal rate and regular rhythm.      Heart sounds: No murmur heard.  Pulmonary:      Effort: Pulmonary effort is normal. No respiratory distress.      Breath sounds: Normal breath sounds. No wheezing, rhonchi or rales.   Abdominal:      Palpations: Abdomen is soft.      Tenderness: There is no abdominal tenderness.   Musculoskeletal:      Right lower leg: No edema.      Left lower leg: No edema.   Skin:     General: Skin is warm and dry.   Neurological:      Mental Status: He is alert.         Lab Results: I have reviewed the following results:   Results from last 7 days   Lab Units 01/12/25  0459 01/11/25  0519 01/09/25  2259   WBC Thousand/uL 6.22   < > 5.58   HEMOGLOBIN g/dL 11.2*   < > 11.1*   HEMATOCRIT % 34.7*   < > 33.9*   PLATELETS Thousands/uL 219   < > 274   SEGS PCT %  --   --  53   LYMPHO PCT %  --   --  30   MONO PCT %  --   --  9   EOS PCT %  --   --  6    < > = values in this interval not displayed.      Results from last 7 days   Lab Units 01/12/25  0459 01/10/25  1324 01/09/25  2259   SODIUM mmol/L 137   < > 140   POTASSIUM mmol/L 5.1   < > 4.4   CHLORIDE mmol/L 98   < > 97   CO2 mmol/L 29   < > 34*   BUN mg/dL 31*   < > 19   CREATININE mg/dL 10.26*   < > 9.37*   ANION GAP mmol/L 10   < > 9   CALCIUM mg/dL 8.2*   < > 8.5   ALBUMIN g/dL  --   --  4.0   TOTAL BILIRUBIN mg/dL  --   --  0.53   ALK PHOS U/L  --   --  86   ALT U/L  --   --  11   AST U/L  --   --  20   GLUCOSE RANDOM mg/dL 196*   < > 190*    < > = values in this interval not displayed.         Results from last 7 days   Lab Units 01/13/25  0511 01/12/25  2229 01/12/25  1601 01/12/25  1145 01/12/25  0725 01/11/25  2058 01/11/25  1128 01/07/25  1106 01/07/25  0709 01/06/25  2059 01/06/25  1605 01/06/25  1139   POC GLUCOSE mg/dl 164* 154* 179* 273* 216* 266* 163* 173* 214* 214* 213* 144*               Imaging Results Review: I reviewed radiology reports from this admission including: CT head and xray(s).  Other Study Results Review: EKG was reviewed.     Last 24 Hours Medication List:     Current Facility-Administered Medications:     aspirin (ECOTRIN LOW STRENGTH) EC tablet 81 mg, Daily    atorvastatin (LIPITOR) tablet 40 mg, QPM    calcitriol (ROCALTROL) capsule 0.75 mcg, Once per day on Monday Wednesday Friday    calcium acetate (PHOSLO) capsule 667 mg, TID    chlorhexidine (PERIDEX) 0.12 % oral rinse 15 mL, Q12H HALIE    cinacalcet (SENSIPAR) tablet 60 mg, Daily    cloNIDine (CATAPRES) tablet 0.2 mg, Q8H HALIE    doxazosin (CARDURA) tablet 4 mg, Daily    escitalopram (LEXAPRO) tablet 20 mg, Daily    famotidine (PEPCID) tablet 10 mg, Daily    gabapentin (NEURONTIN) capsule 100 mg, Daily **AND** gabapentin (NEURONTIN) capsule 200 mg, HS    hydrALAZINE (APRESOLINE) tablet 100 mg, Q8H HALIE    hydrOXYzine HCL (ATARAX) tablet 10 mg, Q6H PRN    insulin aspart (NovoLOG) FOR PUMP REFILLS 1 Units, Daily PRN    insulin aspart (NovoLOG) FOR PUMP REFILLS 1 Units,  Daily PRN    labetalol (NORMODYNE) tablet 400 mg, TID    losartan (COZAAR) tablet 100 mg, HS    NIFEdipine (PROCARDIA XL) 24 hr tablet 90 mg, BID    ondansetron (ZOFRAN-ODT) dispersible tablet 4 mg, Q6H PRN    PATIENT MAINTAINED INSULIN PUMP 1 each, Q8H    torsemide (DEMADEX) tablet 100 mg, Daily    traZODone (DESYREL) tablet 50 mg, HS    vit B complex-vit C-folic acid (Renal Caps) capsule 1 capsule, Daily Before Lunch    Administrative Statements   Today, Patient Was Seen By: Yaneli Martinez, DO  I have spent a total time of 30 minutes in caring for this patient on the day of the visit/encounter including Diagnostic results, Patient and family education, Counseling / Coordination of care, Documenting in the medical record, Reviewing / ordering tests, medicine, procedures  , Obtaining or reviewing history  , and Communicating with other healthcare professionals .    **Please Note: This note may have been constructed using a voice recognition system.**

## 2025-01-13 NOTE — ASSESSMENT & PLAN NOTE
Patient has history of hypertension with end-stage renal disease.  Has previously been worked up for secondary causes including pheochromocytoma, renal artery stenosis.  Started on Cardene drip but was weaned off and started on hydralazine 20 mg.  Recently discharged on 1/7 for similar presentation.  Reports compliance with medications as outpatient.  Presented with headache which has improved with blood pressure reduction.  Current blood pressure is at goal, 166/85.  Interestingly blood pressure was 122/70 during his office visit with cardiology on 12/26/2024.  Echo 11/6/2024 showed EF of 65% and G1 DD.     Plan:  Resume his home medications  Cardene 0.2 mg every 12 hours increased to q8h  Doxazosin increased to 4 mg daily  Hydralazine 100 mg every 8 hours  Labetalol 400 mg 3 times daily  Losartan 100 mg daily  Nifedipine 90 mg bid  Continue to monitor blood pressure.  Appreciate nephrology help with blood pressure management   [FreeTextEntry1] : AUR wiht traumatic dc placement yesterday  + hematuria  1- dc placed today after 10 ml 2% lidocaine jelly  2- irrigated wiht one liter sterile water wht removal of small clots  3- keflex 250 BID x 3-5 d as PPX for cath placement and irrigation 4- supplies for irrigation at home if needed given 5- to rtc 1-2 weeks for dc removal if possible  6- encourage hydration

## 2025-01-13 NOTE — ASSESSMENT & PLAN NOTE
Lab Results   Component Value Date    EGFR 5 01/13/2025    EGFR 4 01/13/2025    EGFR 5 01/12/2025    CREATININE 10.77 (H) 01/13/2025    CREATININE 11.76 (H) 01/13/2025    CREATININE 10.26 (H) 01/12/2025   Continue management per nephrology team

## 2025-01-13 NOTE — ASSESSMENT & PLAN NOTE
Lab Results   Component Value Date    HGBA1C 6.9 (H) 11/05/2024       Recent Labs     01/12/25  1601 01/12/25  2229 01/13/25  0511 01/13/25  1221   POCGLU 179* 154* 164* 207*     On insulin pump  Follow up outpatient with Endocrinology

## 2025-01-13 NOTE — ASSESSMENT & PLAN NOTE
Lab Results   Component Value Date    HGBA1C 6.9 (H) 11/05/2024       Recent Labs     01/12/25  1145 01/12/25  1601 01/12/25  2229 01/13/25  0511   POCGLU 273* 179* 154* 164*       Blood Sugar Average: Last 72 hrs:  (P) [id number]  Continue use of OmniPod 5 insulin pump while he is inpatient  Avoid hypoglycemia and treat per protocol  His glycemic control is currently acceptable and there is no objection from the endocrinology perspective to discharge  Discussed outpatient follow up with his endocrinologist next week

## 2025-01-13 NOTE — ASSESSMENT & PLAN NOTE
Patient has history of hypertension with end-stage renal disease.  Has previously been worked up for secondary causes including pheochromocytoma, renal artery stenosis.  Started on Cardene drip but was weaned off and started on hydralazine 20 mg.  Recently discharged on 1/7 for similar presentation.  Reports compliance with medications as outpatient.  Presented with headache which has improved with blood pressure reduction.  Current blood pressure is at goal, 166/85.  Interestingly blood pressure was 122/70 during his office visit with cardiology on 12/26/2024.  Echo 11/6/2024 showed EF of 65% and G1 DD.     Plan:  Anti-htn medication regimen at discharge:   Cardene 0.2 mg every 12 hours increased to q8h  Doxazosin increased to 4 mg daily  Hydralazine 100 mg every 8 hours  Labetalol 400 mg 3 times daily  Losartan 100 mg daily  Nifedipine 90 mg bid  Follow up outpatient with Nephrology and PCP

## 2025-01-13 NOTE — ASSESSMENT & PLAN NOTE
Lab Results   Component Value Date    EGFR 5 01/13/2025    EGFR 4 01/13/2025    EGFR 5 01/12/2025    CREATININE 10.77 (H) 01/13/2025    CREATININE 11.76 (H) 01/13/2025    CREATININE 10.26 (H) 01/12/2025     On hemodialysis M,W, F   He is on transplant list and following with [facility]  Follow up with Nephrology outpatient

## 2025-01-13 NOTE — ASSESSMENT & PLAN NOTE
Lab Results   Component Value Date    HGBA1C 6.9 (H) 11/05/2024       Recent Labs     01/12/25  1145 01/12/25  1601 01/12/25  2229 01/13/25  0511   POCGLU 273* 179* 154* 164*     On insulin pump  Continue to trend blood sugars, blood sugars appear to be improving

## 2025-01-13 NOTE — PLAN OF CARE
Post-Dialysis RN Treatment Note    Blood Pressure:  Pre:  155/82 mm/Hg  Post:  131/77 mmHg   EDW:  89.4 kg    Weight:  Pre:  93.1 kg   Post: 89.0  kg   Mode of weight measurement: Standing Scale   Volume Removed:   4100 ml    Treatment duration: 195 minutes    NS given:      Treatment shortened Yes, describe: Staffing   Medications given during Rx: None Reported   Estimated Kt/V:  Not Applicable   Access type: AV fistula   Needle Gauge: 15 gauge   Access Issues: No    Report called to primary nurse:   Randee Mallory    Problem: METABOLIC, FLUID AND ELECTROLYTES - ADULT  Goal: Electrolytes maintained within normal limits  Description: INTERVENTIONS:  - Monitor labs and assess patient for signs and symptoms of electrolyte imbalances  - Administer electrolyte replacement as ordered  - Monitor response to electrolyte replacements, including repeat lab results as appropriate  - Instruct patient on fluid and nutrition as appropriate  Outcome: Progressing  Goal: Fluid balance maintained  Description: INTERVENTIONS:  - Monitor labs   - Monitor I/O and WT  - Instruct patient on fluid and nutrition as appropriate  - Assess for signs & symptoms of volume excess or deficit  Outcome: Progressing

## 2025-01-13 NOTE — ASSESSMENT & PLAN NOTE
Recent Labs     01/12/25  0459 01/13/25  0453 01/13/25  0803   K 5.1 5.8* 5.1   MG  --  2.7  --      Patient ECG looks unchanged from ECG on admission  Current medications that may contribute: losartan    Plan:  Dialysis scheduled for today  Continue to monitor with daily labs  Start telemetry

## 2025-01-13 NOTE — DISCHARGE SUMMARY
Discharge Summary - Hospitalist   Name: Mateus Mckeon 39 y.o. male I MRN: [id number]  Unit/Bed#: S -01 I Date of Admission: 1/9/2025   Date of Service: 1/13/2025 I Hospital Day: 3     Assessment & Plan  Hypertensive urgency  Patient has history of hypertension with end-stage renal disease.  Has previously been worked up for secondary causes including pheochromocytoma, renal artery stenosis.  Started on Cardene drip but was weaned off and started on hydralazine 20 mg.  Recently discharged on 1/7 for similar presentation.  Reports compliance with medications as outpatient.  Presented with headache which has improved with blood pressure reduction.  Current blood pressure is at goal, 166/85.  Interestingly blood pressure was 122/70 during his office visit with cardiology on 12/26/2024.  Echo 11/6/2024 showed EF of 65% and G1 DD.     Plan:  Anti-htn medication regimen at discharge:   Cardene 0.2 mg every 12 hours increased to q8h  Doxazosin increased to 4 mg daily  Hydralazine 100 mg every 8 hours  Labetalol 400 mg 3 times daily  Losartan 100 mg daily  Nifedipine 90 mg bid  Follow up outpatient with Nephrology and PCP   Type 1 diabetes ([facility])  Lab Results   Component Value Date    HGBA1C 6.9 (H) 11/05/2024       Recent Labs     01/12/25  1601 01/12/25  2229 01/13/25  0511 01/13/25  1221   POCGLU 179* 154* 164* 207*     On insulin pump  Follow up outpatient with Endocrinology   Anemia of chronic disease  Recent Labs     01/11/25  0519 01/12/25  0459 01/13/25  0453   HGB 11.3* 11.2* 11.1*     Patient receives EPO with dialysis.  Secondary to ESRD.  ESRD (end stage renal disease) ([facility])  Lab Results   Component Value Date    EGFR 5 01/13/2025    EGFR 4 01/13/2025    EGFR 5 01/12/2025    CREATININE 10.77 (H) 01/13/2025    CREATININE 11.76 (H) 01/13/2025    CREATININE 10.26 (H) 01/12/2025     On hemodialysis M,W, F   He is on transplant list and following with [facility]  Follow up with Nephrology outpatient  "  Influenza  Finished course of tamiflu while hospitalized   Patient is currently asymptomatic  History of CVA (cerebrovascular accident)  He did have 3 previous CVAs, likely from uncontrolled BP.   2 ischemic strokes, 1 hemorrhagic.  SANJEEV showed no structural heart disease, no thrombus.   He had an implantable loop recorder in 2018 which did not show any atrial fibrillation.   Explanted in Sep 2023   Has baseline mild cognitive deficit.  Has residual right upper extremity weakness.  Thromboembolic disorder (HCC)  Prothrombin gene mutation  Patient's mother reports that he was on an antiplatelet prior but following with hematology and they recommended to stay only on baby aspirin   Hyperkalemia  Recent Labs     01/12/25  0459 01/13/25  0453 01/13/25  0803 01/13/25  1058   K 5.1 5.8* 5.1  --    MG  --  2.7  --  2.3     Patient ECG looks unchanged from ECG on admission  Current medications that may contribute: losartan    Plan:  Continue MWF dialysis regimen  Follow up outpatient with Nephrology     Secondary hyperparathyroidism of renal origin (HCC)       Medical Problems       Resolved Problems  Date Reviewed: 1/12/2025   None       Discharging Physician / Practitioner: Yaneli Phillips MD  PCP: Dania Sher DO  Admission Date:   Admission Orders (From admission, onward)       Ordered        01/10/25 1409  INPATIENT ADMISSION  Once            01/10/25 0303  Place in Observation  Once                          Discharge Date: 01/13/25    Consultations During Hospital Stay:  Nephrology  Endocrinology    Procedures Performed:   Dialysis     Significant Findings / Test Results:   CT head demonstrated no acute intracranial abnormalities     Incidental Findings:   None    Test Results Pending at Discharge (will require follow up):   None     Outpatient Tests Requested:  None    Complications:  None    Reason for Admission: Hypertensive Urgency     HPI: \"Mateus Mckeon is a 39 y.o. male with a PMH of prior " "CVA, hypertension, ESRD on dialysis, type 1 diabetes who presents with headache and blood pressure elevation with systolics in the 200s.  Patient was recently discharged on 9/7/2025 for similar presentation and was started on clonidine and torsemide. On my evaluation he was unsure of his most recent torsemide dose. He otherwise reported compliance with his other medications. He did note that he has difficulty remembering which medications he takes however his mother helps him. This afternoon, his blood pressure has been consistently above 200 systolic even with double dosing his labetalol and hydralazine.  Patient also was suffering from nausea which improved after taking 4 mg of Zofran.  Also reported a headache that has improved. The patient denies vision changes, chest pain, shortness of breath, numbness, new/worsening tingling, new/worsening weakness, difficulty ambulating, diarrhea or emesis.\"    Hospital Course:   Mateus Mckeon is a 39 y.o. male patient who originally presented to the hospital on 1/9/2025 due to severe blood pressure elevation in the setting of nausea and headache. Patient initially required cardene drip to control severe range blood pressures, and was therefore briefly under the care of the ICU. Nephrology was consulted during admission, who adjusted patient's anti-htn medications as well as managed patient's hemodialysis. On day of discharge, patient's blood pressures were significantly improved and patient's symptoms had resolved. Patient was instructed to follow up outpatient with his PCP, Nephrologist, and Endocrinologist.     Please see above list of diagnoses and related plan for additional information.     Condition at Discharge: good    Discharge Day Visit / Exam:   * Please refer to separate progress note for these details *    Discussion with Family: Updated  (mother) via phone.    Discharge instructions/Information to patient and family:   See after visit " summary for information provided to patient and family.      Provisions for Follow-Up Care:  See after visit summary for information related to follow-up care and any pertinent home health orders.      Mobility at time of Discharge:   Basic Mobility Inpatient Raw Score: 22  JH-HLM Goal: 7: Walk 25 feet or more  JH-HLM Achieved: 7: Walk 25 feet or more  HLM Goal achieved. Continue to encourage appropriate mobility.     Disposition:   Home    Planned Readmission: None    Discharge Medications:  See after visit summary for reconciled discharge medications provided to patient and/or family.      Administrative Statements   Discharge Statement:  I have spent a total time of 30 minutes in caring for this patient on the day of the visit/encounter. .    **Please Note: This note may have been constructed using a voice recognition system**

## 2025-01-13 NOTE — CONSULTS
"Consultation - Endocrinology   Name: Mateus Mckeon 39 y.o. male I MRN: [id number]  Unit/Bed#: S -01 I Date of Admission: 1/9/2025   Date of Service: 1/13/2025 I Hospital Day: 3   Inpatient consult to Endocrinology  Consult performed by: Keke Munguia DO  Consult ordered by: Ceferino Polanco DO      Physician Requesting Evaluation: Ceferino Polanco DO   Reason for Evaluation / Principal Problem: Type 1 diabetes mellitus on insulin pump    Assessment & Plan  Type 1 diabetes (HCC)  Lab Results   Component Value Date    HGBA1C 6.9 (H) 11/05/2024       Recent Labs     01/12/25  1145 01/12/25  1601 01/12/25  2229 01/13/25  0511   POCGLU 273* 179* 154* 164*       Blood Sugar Average: Last 72 hrs:  (P) [id number]  Continue use of OmniPod 5 insulin pump while he is inpatient  Avoid hypoglycemia and treat per protocol  His glycemic control is currently acceptable and there is no objection from the endocrinology perspective to discharge  Discussed outpatient follow up with his endocrinologist next week    Hypertensive urgency  Continue management per primary team   ESRD (end stage renal disease) ([facility])  Lab Results   Component Value Date    EGFR 5 01/13/2025    EGFR 4 01/13/2025    EGFR 5 01/12/2025    CREATININE 10.77 (H) 01/13/2025    CREATININE 11.76 (H) 01/13/2025    CREATININE 10.26 (H) 01/12/2025   Continue management per nephrology team  Influenza  Continue management per primary team    I have discussed the above management plan in detail with the primary service.   Please contact the SecureChat role,\"AN Endocrinology Call\", with any questions/concerns.      History of Present Illness   Mateus Mckeon is a 39 y.o. male with a past medical history significant for type 1 diabetes mellitus complicated by neuropathy, retinopathy, history of CVA, and ESRD-HD along with hypothyroidism who presented with a headache and elevated systolic blood pressures and is admitted for further management of his " hypertensive urgency. Endocrinology is consulted for further management of his type 1 diabetes mellitus and insulin pump. His wife is present at bedside and provided additional history.    He follows Dr. Myrick at [facility] for his diabetes care and currently uses an OmniPod 5 insulin pump with Dexcom G6 CGM and NovoLog insulin. His current settings are as follows:    Basal rate:      MN-6a: 0.5 u/hr     6a-MN: 0.65 u/hr  ICR: 16  ISF: 50  Target blood glucose: 120   AIT: 4 hours     His glycemic control at home is reportedly appropriate with occasional false hypoglycemia due to sensor inaccuracies. He has been ill with Flu A and COVID-19 over the past month but did not require steroids for treatment.       Review of Systems   Constitutional:  Negative for chills and fever.   HENT:  Negative for ear pain and sore throat.    Eyes:  Negative for pain and visual disturbance.   Respiratory:  Negative for cough and shortness of breath.    Cardiovascular:  Negative for chest pain and palpitations.   Gastrointestinal:  Negative for abdominal pain and vomiting.   Genitourinary:  Negative for dysuria and hematuria.   Musculoskeletal:  Negative for arthralgias and back pain.   Skin:  Negative for color change and rash.   Neurological:  Negative for seizures and syncope.   All other systems reviewed and are negative.    I have reviewed the patient's PMH, PSH, Social History, Family History, Meds, and Allergies    Objective :  Temp:  [97.7 °F (36.5 °C)-98.2 °F (36.8 °C)] 98 °F (36.7 °C)  HR:  [69-75] 72  BP: (136-155)/(77-86) 136/77  Resp:  [16-18] 16  SpO2:  [94 %-99 %] 99 %  O2 Device: None (Room air)    Physical Exam  Vitals and nursing note reviewed.   Constitutional:       General: He is not in acute distress.     Appearance: He is well-developed.   HENT:      Head: Normocephalic and atraumatic.   Eyes:      Conjunctiva/sclera: Conjunctivae normal.   Cardiovascular:      Rate and Rhythm: Normal rate and regular  rhythm.      Heart sounds: No murmur heard.  Pulmonary:      Effort: Pulmonary effort is normal. No respiratory distress.      Breath sounds: Normal breath sounds.   Abdominal:      Palpations: Abdomen is soft.      Tenderness: There is no abdominal tenderness.   Musculoskeletal:         General: No swelling.      Cervical back: Neck supple.   Skin:     General: Skin is warm and dry.      Capillary Refill: Capillary refill takes less than 2 seconds.   Neurological:      Mental Status: He is alert.   Psychiatric:         Mood and Affect: Mood normal.         Lab Results: I have reviewed the following results:CBC/BMP:   .     01/13/25  0453 01/13/25  0803   WBC 6.61  --    HGB 11.1*  --    HCT 34.0*  --      --    SODIUM 137 137   K 5.8* 5.1   CL 99 100   CO2 27 27   BUN 44* 41*   CREATININE 11.76* 10.77*   GLUC 156* 140   MG 2.7  --    PHOS 7.3*  --     , Creatinine Clearance: Estimated Creatinine Clearance: 10.5 mL/min (A) (by C-G formula based on SCr of 10.77 mg/dL (H))., LFTs:   .     01/13/25  0453   ALB 3.6       Imaging Results Review: I reviewed radiology reports from this admission including: chest xray and CT head.

## 2025-01-13 NOTE — ASSESSMENT & PLAN NOTE
Lab Results   Component Value Date    EGFR 5 01/12/2025    EGFR 6 01/10/2025    EGFR 6 01/09/2025    CREATININE 10.26 (H) 01/12/2025    CREATININE 9.05 (H) 01/10/2025    CREATININE 9.37 (H) 01/09/2025     On hemodialysis M,W, F   He is on transplant list and following with [facility]  Continue to monitor electrolytes and volume status  Nephrology consulted to facilitate dialysis.

## 2025-01-13 NOTE — PLAN OF CARE
Problem: Potential for Falls  Goal: Patient will remain free of falls  Description: INTERVENTIONS:  - Educate patient/family on patient safety including physical limitations  - Instruct patient to call for assistance with activity   - Consult OT/PT to assist with strengthening/mobility   - Keep Call bell within reach  - Keep bed low and locked with side rails adjusted as appropriate  - Keep care items and personal belongings within reach  - Initiate and maintain comfort rounds  - Make Fall Risk Sign visible to staff  - Offer Toileting every 2 Hours, in advance of need  - Initiate/Maintain bed alarm  - Obtain necessary fall risk management equipment  - Apply yellow socks and bracelet for high fall risk patients  - Consider moving patient to room near nurses station  Outcome: Progressing     Problem: METABOLIC, FLUID AND ELECTROLYTES - ADULT  Goal: Electrolytes maintained within normal limits  Description: INTERVENTIONS:  - Monitor labs and assess patient for signs and symptoms of electrolyte imbalances  - Administer electrolyte replacement as ordered  - Monitor response to electrolyte replacements, including repeat lab results as appropriate  - Instruct patient on fluid and nutrition as appropriate  Outcome: Progressing  Goal: Fluid balance maintained  Description: INTERVENTIONS:  - Monitor labs   - Monitor I/O and WT  - Instruct patient on fluid and nutrition as appropriate  - Assess for signs & symptoms of volume excess or deficit  Outcome: Progressing     Problem: PAIN - ADULT  Goal: Verbalizes/displays adequate comfort level or baseline comfort level  Description: Interventions:  - Encourage patient to monitor pain and request assistance  - Assess pain using appropriate pain scale  - Administer analgesics based on type and severity of pain and evaluate response  - Implement non-pharmacological measures as appropriate and evaluate response  - Consider cultural and social influences on pain and pain management  -  Notify physician/advanced practitioner if interventions unsuccessful or patient reports new pain  Outcome: Progressing     Problem: INFECTION - ADULT  Goal: Absence or prevention of progression during hospitalization  Description: INTERVENTIONS:  - Assess and monitor for signs and symptoms of infection  - Monitor lab/diagnostic results  - Monitor all insertion sites, i.e. indwelling lines, tubes, and drains  - Monitor endotracheal if appropriate and nasal secretions for changes in amount and color  - Prospect appropriate cooling/warming therapies per order  - Administer medications as ordered  - Instruct and encourage patient and family to use good hand hygiene technique  - Identify and instruct in appropriate isolation precautions for identified infection/condition  Outcome: Progressing  Goal: Absence of fever/infection during neutropenic period  Description: INTERVENTIONS:  - Monitor WBC    Outcome: Progressing     Problem: SAFETY ADULT  Goal: Patient will remain free of falls  Description: INTERVENTIONS:  - Educate patient/family on patient safety including physical limitations  - Instruct patient to call for assistance with activity   - Consult OT/PT to assist with strengthening/mobility   - Keep Call bell within reach  - Keep bed low and locked with side rails adjusted as appropriate  - Keep care items and personal belongings within reach  - Initiate and maintain comfort rounds  - Make Fall Risk Sign visible to staff  - Offer Toileting every 2 Hours, in advance of need  - Initiate/Maintain bed/chair alarm  - Obtain necessary fall risk management equipment  - Apply yellow socks and bracelet for high fall risk patients  - Consider moving patient to room near nurses station  Outcome: Progressing  Goal: Maintain or return to baseline ADL function  Description: INTERVENTIONS:  -  Assess patient's ability to carry out ADLs; assess patient's baseline for ADL function and identify physical deficits which impact  ability to perform ADLs (bathing, care of mouth/teeth, toileting, grooming, dressing, etc.)  - Assess/evaluate cause of self-care deficits   - Assess range of motion  - Assess patient's mobility; develop plan if impaired  - Assess patient's need for assistive devices and provide as appropriate  - Encourage maximum independence but intervene and supervise when necessary  - Involve family in performance of ADLs  - Assess for home care needs following discharge   - Consider OT consult to assist with ADL evaluation and planning for discharge  - Provide patient education as appropriate  Outcome: Progressing  Goal: Maintains/Returns to pre admission functional level  Description: INTERVENTIONS:  - Perform AM-PAC 6 Click Basic Mobility/ Daily Activity assessment daily.  - Set and communicate daily mobility goal to care team and patient/family/caregiver.   - Collaborate with rehabilitation services on mobility goals if consulted  - Out of bed for meals 3 times a day  - Out of bed for toileting  - Record patient progress and toleration of activity level   Outcome: Progressing     Problem: DISCHARGE PLANNING  Goal: Discharge to home or other facility with appropriate resources  Description: INTERVENTIONS:  - Identify barriers to discharge w/patient and caregiver  - Arrange for needed discharge resources and transportation as appropriate  - Identify discharge learning needs (meds, wound care, etc.)  - Arrange for interpretive services to assist at discharge as needed  - Refer to Case Management Department for coordinating discharge planning if the patient needs post-hospital services based on physician/advanced practitioner order or complex needs related to functional status, cognitive ability, or social support system  Outcome: Progressing     Problem: Knowledge Deficit  Goal: Patient/family/caregiver demonstrates understanding of disease process, treatment plan, medications, and discharge instructions  Description: Complete  learning assessment and assess knowledge base.  Interventions:  - Provide teaching at level of understanding  - Provide teaching via preferred learning methods  Outcome: Progressing

## 2025-01-13 NOTE — ASSESSMENT & PLAN NOTE
Recent Labs     01/12/25  0459 01/13/25  0453 01/13/25  0803 01/13/25  1058   K 5.1 5.8* 5.1  --    MG  --  2.7  --  2.3     Patient ECG looks unchanged from ECG on admission  Current medications that may contribute: losartan    Plan:  Continue MWF dialysis regimen  Follow up outpatient with Nephrology

## 2025-01-14 ENCOUNTER — TRANSITIONAL CARE MANAGEMENT (OUTPATIENT)
Age: 40
End: 2025-01-14

## 2025-01-14 ENCOUNTER — APPOINTMENT (OUTPATIENT)
Facility: CLINIC | Age: 40
End: 2025-01-14
Payer: MEDICARE

## 2025-01-15 ENCOUNTER — OFFICE VISIT (OUTPATIENT)
Age: 40
End: 2025-01-15
Payer: MEDICARE

## 2025-01-15 ENCOUNTER — TELEPHONE (OUTPATIENT)
Dept: NEUROLOGY | Facility: CLINIC | Age: 40
End: 2025-01-15

## 2025-01-15 VITALS
BODY MASS INDEX: 28.42 KG/M2 | WEIGHT: 203 LBS | RESPIRATION RATE: 16 BRPM | DIASTOLIC BLOOD PRESSURE: 68 MMHG | SYSTOLIC BLOOD PRESSURE: 118 MMHG | TEMPERATURE: 96.5 F | HEIGHT: 71 IN | HEART RATE: 70 BPM | OXYGEN SATURATION: 99 %

## 2025-01-15 DIAGNOSIS — J11.1 INFLUENZA: ICD-10-CM

## 2025-01-15 DIAGNOSIS — I16.0 HYPERTENSIVE URGENCY: ICD-10-CM

## 2025-01-15 DIAGNOSIS — N18.6 ESRD (END STAGE RENAL DISEASE) (HCC): ICD-10-CM

## 2025-01-15 DIAGNOSIS — R42 DIZZINESS: Primary | ICD-10-CM

## 2025-01-15 LAB
ATRIAL RATE: 70 BPM
P AXIS: 7 DEGREES
PR INTERVAL: 174 MS
QRS AXIS: 40 DEGREES
QRSD INTERVAL: 66 MS
QT INTERVAL: 442 MS
QTC INTERVAL: 477 MS
T WAVE AXIS: -31 DEGREES
VENTRICULAR RATE: 70 BPM

## 2025-01-15 PROCEDURE — 99496 TRANSJ CARE MGMT HIGH F2F 7D: CPT | Performed by: INTERNAL MEDICINE

## 2025-01-15 PROCEDURE — 93010 ELECTROCARDIOGRAM REPORT: CPT | Performed by: INTERNAL MEDICINE

## 2025-01-15 NOTE — TELEPHONE ENCOUNTER
made a call for appt reminder with Dr. Graves 1/23/25 @11:00am.  and also mentioned [place] office address as well.

## 2025-01-15 NOTE — PROGRESS NOTES
Transition of Care Visit  Name: Mateus Mckeon      : 1985      MRN: [id number]  Encounter Provider: Dania Sher DO  Encounter Date: 1/15/2025   Encounter department: [facility] INTERNAL MEDICINE    Assessment & Plan  Dizziness  -pt has been more active this morning and felt dizzy following his 7a meds.  BP normal but low for him  -advised decreasing Labetalol to 200mg AM, continue 400mg doses in afternoon and evening       Hypertensive urgency  -hospitalized twice for HTN urgency requiring cardene gtt  -bp meds adjusted  -currently experiencing dizziness after showering this morning taking morning meds  -recommend adjustment.  Continue to take hydralalzine, doxazosin 4mg and clonidine 0.2mg at 5a.  Decrease labetalol 400mg to 200mg at 7a.  Continue nifedipine 90mg.  Continue rest of meds the same.  May need further adjustment based on his activities, pt to discuss with nephro       Influenza  -completed course of tamiflu  -no residual symptoms       ESRD (end stage renal disease) ([facility])  Lab Results   Component Value Date    EGFR 5 2025    EGFR 4 2025    EGFR 5 2025    CREATININE 10.77 (H) 2025    CREATININE 11.76 (H) 2025    CREATININE 10.26 (H) 2025   -goes to HD MWF              History of Present Illness     Transitional Care Management Review:   Mateus Mckeon is a 39 y.o. male here for TCM follow up.     During the TCM phone call patient stated:  TCM Call     Date and time call was made  2025  9:40 AM    Hospital care reviewed  Records reviewed    Patient was hospitialized at  [facility]     Date of Admission  25    Date of discharge  25    Diagnosis  Hypertensive urgency    Disposition  Home    Were the patients medications reviewed and updated  Yes    Current Symptoms  None      TCM Call     Post hospital issues  None    Should patient be enrolled in anticoag monitoring?  No  "   Scheduled for follow up?  Yes    Patients specialists  Endocrinologist    Did you obtain your prescribed medications  Yes    Do you need help managing your prescriptions or medications  No    Is transportation to your appointment needed  No    I have advised the patient to call PCP with any new or worsening symptoms  Alisa Foley M.A    Living Arrangements  Family members    Support System  [place]-based; Family; Friends    The type of support provided  Emotional; Physical    Do you have social support  Yes, as much as I need    Are you recieving any outpatient services  No    Are you recieving home care services  No    Are you using any community resources  No    Current waiver services  No    Have you fallen in the last 12 months  No    Interperter language line needed  No    Counseling  Patient    Comments  Patient appointment shceduled for 4/15/21        HPI    He is accompanied by his mom.    He was hospitalized 1/4-1/7/25.for hypertensive urgency with med adjustments.  He then returned and hospitalized again 1/9-1/13/25 for same and further medication adjustments made.      Since discharge, BP has been 140/-.  Today is first day he has had more activity.  Currently feels dizzy, weak.  No longer nausea.    Review of Systems   Constitutional:  Positive for appetite change. Negative for fever and unexpected weight change.   Respiratory:  Negative for cough, shortness of breath and wheezing.    Cardiovascular:  Negative for chest pain, palpitations and leg swelling.   Gastrointestinal:  Negative for abdominal pain, constipation and diarrhea.   Genitourinary:  Negative for difficulty urinating.   Musculoskeletal:  Positive for gait problem.   Neurological:  Positive for dizziness, numbness and headaches.     Objective   /68 (BP Location: Right arm, Patient Position: Sitting, Cuff Size: Large)   Pulse 70   Temp (!) 96.5 °F (35.8 °C) (Tympanic Core)   Resp 16   Ht 5' 11\" (1.803 m)   Wt 92.1 kg " (203 lb)   SpO2 99%   BMI 28.31 kg/m²     Physical Exam  Vitals reviewed.   Constitutional:       Appearance: He is not toxic-appearing or diaphoretic.      Comments: Laying flat on exam table   HENT:      Nose:      Comments: Wears face mask  Cardiovascular:      Rate and Rhythm: Normal rate and regular rhythm.      Pulses: Normal pulses.      Heart sounds: Normal heart sounds.   Pulmonary:      Effort: Pulmonary effort is normal. No respiratory distress.      Breath sounds: Normal breath sounds. No wheezing.   Abdominal:      General: Bowel sounds are normal. There is no distension.      Palpations: Abdomen is soft.      Tenderness: There is no abdominal tenderness.   Musculoskeletal:      Right lower leg: No edema.      Left lower leg: No edema.   Skin:     Coloration: Skin is not pale.   Neurological:      Mental Status: He is alert and oriented to person, place, and time.         Medications have been reviewed by provider in current encounter

## 2025-01-15 NOTE — ASSESSMENT & PLAN NOTE
-hospitalized twice for HTN urgency requiring cardene gtt  -bp meds adjusted  -currently experiencing dizziness after showering this morning taking morning meds  -recommend adjustment.  Continue to take hydralalzine, doxazosin 4mg and clonidine 0.2mg at 5a.  Decrease labetalol 400mg to 200mg at 7a.  Continue nifedipine 90mg.  Continue rest of meds the same.  May need further adjustment based on his activities, pt to discuss with nephro

## 2025-01-15 NOTE — ASSESSMENT & PLAN NOTE
Lab Results   Component Value Date    EGFR 5 01/13/2025    EGFR 4 01/13/2025    EGFR 5 01/12/2025    CREATININE 10.77 (H) 01/13/2025    CREATININE 11.76 (H) 01/13/2025    CREATININE 10.26 (H) 01/12/2025   -goes to HD [facility]

## 2025-01-16 ENCOUNTER — EVALUATION (OUTPATIENT)
Facility: CLINIC | Age: 40
End: 2025-01-16
Payer: MEDICARE

## 2025-01-16 DIAGNOSIS — R53.1 GENERALIZED WEAKNESS: ICD-10-CM

## 2025-01-16 DIAGNOSIS — I63.89 CEREBROVASCULAR ACCIDENT (CVA) DUE TO OTHER MECHANISM (HCC): Primary | ICD-10-CM

## 2025-01-16 DIAGNOSIS — I63.9 CEREBROVASCULAR ACCIDENT (CVA), UNSPECIFIED MECHANISM (HCC): ICD-10-CM

## 2025-01-16 DIAGNOSIS — N18.6 END STAGE RENAL DISEASE ON DIALYSIS (HCC): ICD-10-CM

## 2025-01-16 DIAGNOSIS — Z91.81 STATUS POST FALL: Primary | ICD-10-CM

## 2025-01-16 DIAGNOSIS — Z99.2 END STAGE RENAL DISEASE ON DIALYSIS (HCC): ICD-10-CM

## 2025-01-16 DIAGNOSIS — R26.89 OTHER ABNORMALITIES OF GAIT AND MOBILITY: ICD-10-CM

## 2025-01-16 PROCEDURE — 97168 OT RE-EVAL EST PLAN CARE: CPT

## 2025-01-16 PROCEDURE — 97530 THERAPEUTIC ACTIVITIES: CPT | Performed by: PHYSICAL THERAPIST

## 2025-01-16 NOTE — PROGRESS NOTES
PT Re-Evaluation             POC expires Auth Status Total   Visits  Start date  Expiration date PT/OT + Visit Limit? Co-Insurance   2/10/25 Submitted to FD 25, AV pending pending pending BOMN Yes, $0 co-pay                                                                               Today's date: 2025  Patient name: Mateus Mckeon  : 1985  MRN: [id number]  Referring provider: Dania Sher DO  Dx:   Encounter Diagnosis     ICD-10-CM    1. Status post fall  Z91.81       2. Cerebrovascular accident (CVA), unspecified mechanism (HCC)  I63.9       3. Other abnormalities of gait and mobility  R26.89                   Assessment  Assessment details: Patient is a 39 y.o. Male who is familiar to the balance center presents back following self-discharge after 24. Past medical history significant for ESRD on dialysis, and CVA's (most recent one in 2024). He has attended PT inconsistently from November- December and most recently was hospitalized twice in January due to hypertensive emergency and flu. He returns now feeling weak and deconditioned secondary to hospitalizations. He has regressed in 5x STS, TUG, modified CTSIB, and ABC scores; all are indicative of high fall risk. He maintained his Callejas score, remains high fall risk. Deferred 6MWT, 10 meter walk test, and FGA due to pt fatigue. He c/o dizziness following Callejas and his BP was 108/60 mmHg; pt reports he will inform PCP today because he was on multiple BP meds related to his hospitalization and may need adjustments.  He has met no new goals at this time. Updated pt HEP to focus on sit>stands and resuming treadmill gradually, in order to regain strength and endurance. Will focus on dynamic balance during PT sessions, and continue to give strengthening exercises for HEP. Patient will continue to benefit from skilled OPPT services in order to maximize safe  functional mobility and reduce overall risk for falls.       Impairments: Abnormal coordination, Abnormal gait, Abnormal muscle tone, Abnormal or restricted ROM, Activity intolerance, Impaired balance, Impaired physical strength, Lacks appropriate HEP, Poor posture, Poor body mechanics, Pain with function, Safety issue, Weight-bearing intolerance, Abnormal movement, Difficulty understanding, Abnormal muscle firing  Understanding of Dx/Px/POC: Good  Prognosis: Good    Patient verbalized understanding of POC.    Please contact me if you have any questions or recommendations. Thank you for the referral and the opportunity to share in Mateus Mckeon's care.      Plan  Plan details: PT 2-3x/week   Patient would benefit from: Skilled PT  Planned modality interventions: Biofeedback, Cryotherapy, TENS, Thermotherapy  Planned therapy interventions: Abdominal trunk stabilization, ADL training, Balance, Balance/WB training, Breathing training, Body mechanics training, Coordination, Functional ROM exercises, Gait training, HEP, Joint Mobilization, Manual Therapy, Ma taping, Motor coordination training, Neuromuscular re-education, Patient education, Postural training, Strengthening, Stretching, Therapeutic activities, Therapeutic exercises, Therapeutic training, Transfer training, Activity modification, Work reintegration  Frequency: 2-3x/wk  Duration in weeks: 12 weeks  Plan of Care beginning date: 11/18/2024  Plan of Care expiration date: 12 weeks - 2/10/2025  Treatment plan discussed with: Patient         Goals  Short Term Goals (4 weeks):    -Patient will complete 6 MWT to assess cardiovascular endurance and improve tolerance to activity - MET  -Patient will be independent with HEP within 4 weeks - NOT MET  -Patient will be able to ambulate household distances (100ft) or greater with LRAD within 4 week - ONGOING  - Patient will improve 5xSTS score by 2.3 seconds to promote improved LE functional strength needed  for ADLs - NOT MET  -Patient will improve ABC to 80% to demonstrate increased balance confidence and reduced fall risk - NOT MET    Long Term Goals (12 weeks):  - Patient will be independent in a comprehensive home exercise program  - Patient will improve gait speed to 1.0 m/s to improve safety with community ambulation   - Patient will improve MEDRANO by 6 points to facilitate return to safe independent ambulation  - Patient will complete DGI to assess dynamic balance and safety with ambulation without AD  - Patient will improve 6 Minute Walk Test score by 190 feet to promote improved cardiovascular endurance  - Patient will report going on walks at least 3 days per week to promote independence and improved cardiovascular endurance  - Patient will report ~75% improvement in falls or near falls to promote safety in his community and independence in his household      Cut off score    All date taken from APTA Neuro Section or Rehab Measures      Medrano:  Siva et al., 2018  MDC: 2.7 pts    She et al., 2011  Cut-off score: 45/56    Chronic CVA  < 44/56 high risk for falls (Siva et al., 2018)  < 47.5/56 slow walker status (Jus rodríguez al., 2011) 5xSTS: Jean Carlos 2010  MDC: 2.3 sec  Age Norms:  60-69: 11.1 sec  70-79: 12.6 sec  80-89: 14.8 sec    Yamileth 2010, Chronic Stroke  Chronic CVA: 12 sec   TUG  Nubia rodríguez al., 2005  MDC: 2.9 sec    Cut off score:  >13.5 sec community dwelling adults  >32.2 frail elderly  <20 I for basic transfers  >30 dependent on transfers 10 Meter Walk Test:   Talia et al., 2006  Small meaningful change: 0.06 m/s  Substantial meaningful change: 0.14 m/s  MCID: 0.16 m/s    < 0.4 m/s household ambulators  0.4 - 0.8 m/s limited community ambulators  > 0.8 m/s community ambulators   FGA: Deepika et al., 2010  MCID: 4.2 pts  Geriatrics/community < 22/30 fall risk  Geriatrics/community < 20/30 unexplained falls DGI  MDC: vestibular - 4 pts  MDC: geriatric/community - 3 pts  Falls risk <19/24   6  "Minute Walk Test  Talia et al., 2006  MDC: 60.98 m (200.01 ft)    Cuco, Verónica, & Ally, 2012  MCID: 34.4 m    Age Norms  60-69: M - 1876 ft   F - 1765 ft  70-79: M - 1729 ft   F - 1545 ft  80-89: M - 1368 ft   F - 1286 ft Modified Joel  0: No increase in tone  1: Catch and release or min resistance at end range  1+: Catch f/b min resistance throughout remainder (< half ROM)  2: Easily moved, but more marked tone throughout most ROM  3: Significant tone, PROM difficult  4: Rigid   MiniBest: Glenda et al., 2013  CVA < 17.5 fall risk Pass (Acute CVA)  MDC: 1.8 points (acute), 3.2 points (chronic)         Subjective    History of Present Illness  Mechanism of injury: Patient is familiar to balance center as he participated with OT and PT services before self-discharging from PT after 09/06/24 secondary to \"feeling ready and past the point of needing PT services\". Patient past medical history significant for CVA (3rd one in January), ESRD which he goes to dialysis for 3x a week, frequent falls, and COVID. During his hiatus from PT patient had 2 falls and COVID with subsequent ~5 day hospitalization on 10/18/24. He had a second hospital visit ~2 weeks ago where he was exhibiting seizure like activity. As per patient, medical professionals did not provide rationale for seizure like activity as imaging returned negative. He was not placed on any new medication and was discharged home. Follow-up with neurology in January; denies any new seizure like activity. He ambulates with his rollator while out in his community and cruises on furniture at home despite recommended use of cane or RW at home. Patient largest complaint is that he feels overtly fatigued as his tolerance to activity has vastly decreased, increased word finding difficulties, and decreased balance. His major goal is to improve tolerance and return to level he was prior to self discharge.     Updated (12/24/2024): Patient reports for reassessment after hiatus " from PT services due to being sick with Covid. He feels his endurance has been worse since being sick.    Updated (1/16/25): Patient has been hospitalized twice within the past month (on 1/4 and 1/9) for hypertensive emergency, and also was diagnosed with the flu.He reports feeling weak since being discharged from hospital. He presents with SPC.     Primary AD: currently SPC and rollator depending how he is feeling.   (PLOF he mostly used SPC and only used rollator for long distances like going to nephew's soccer game)   Assist level at home: lives with parents who are assisting with ADL's and IADL's, but he is transferring and walking independently.   WC usage: none  PLOF: using SPC mostly, still required some assist from parents for ADL's and IADL's   Patient goals: to walk with SPC again, work on balance, build up walking endurance     Pain  Pain in both feet due to neuropathy     Social Support  Steps to enter house: 2 NIRU  Stairs in house: full flight to 2nd floor (able to perform independently)    Lives in: 2 story home   Lives with: parents    Employment status: disabled   Hand dominance: right    Treatments  Previous treatment: PT, OT  Current treatment: PT, OT  Diagnostic Testing:       Objective   LE MMT   - R Hip Flexion: 4/5  - L Hip Flexion: 4+/5  - R Hip Extension: 4+/5  - L Hip Extension: 4+/5  - R Hip Abduction: 5/5  - L Hip abduction: 5/5  - R Hip adduction: 5/5  - L Hip adduction: 5/5  - R Knee Extension: 4+/5 - L Knee Extension: 4+/5  - R Knee Flexion: 4+/5  - L Knee Flexion: 4+/5  - R Ankle DF: 4/5  - L Ankle DF: 4/5  - R Ankle PF: 4+/5  - L Ankle PF: 4+/5    Sensation  - Light touch: neuropathy both feet up to below knee    Coordination  - Alternating Toe Taps: impaired  - Heel to shin: impaired   - Dysmetria: R side undershooting   - Dysdiadochokinesia: R slowed     Neglect  - R sided: No  - L sided: No    Gait  Wide COLBY, ataxic, decreased step length    Vitals:  /65 mmHg, reported  feeling dizzy at end of Medrano      Outcome Measures Initial Eval  1/30/24 PN/DC  9/4/2024 Initial Evaluation  11/18/24 PN  12/24/2024 Re-eval  1/16/2025   5xSTS 31.25 sec 13.67 sec   no UE defer 16.33 sec,   no UE 21.03 sec, no UE    TUG 16.3 sec with rollator    23.6 sec no AD 11.48 sec   no AD 18.91 sec w/ rollator     17.16 sec no AD 12.18 sec   no AD 15.72 sec, no AD   10 meter  1.11 m/s self selected pace 0.84 m/s self selected pace  1.05 m/s self selected pace Next time>   MEDRANO  49/56 32/56 40/56 40/56   FGA 12/30 15/30 defer 12/30 Next time>   6MWT 900 ft 955 ft no AD defer 825 ft no AD Next time>   mCTSIB  FTEO firm  FTEC firm  FTEO foam  FTEC foam   30 sec  30 sec  30 sec  Unable defer 30 sec  30 sec  30 sec  2 sec 30 sec  30 sec  16 sec  2 sec   ABC  66.88% 62.5% 57.5% 49.38%        Precautions: Check BP's RUE only (fistula LUE), frequent falls  Past Medical History:   Diagnosis Date    Acute kidney injury (HCC)     Ambulates with cane     Anuria     Anxiety     Cellulitis of right elbow 03/31/2021    Chronic kidney disease     COVID-19 10/18/2024    Depression     Diabetes mellitus (HCC)     Diarrhea     Emesis 10/24/2020    End stage renal disease (HCC) 02/11/2018    Formatting of this note might be different from the original. Last Assessment & Plan:  Secondary to DM.  On nightly PD.  Followed by Nephro.  [facility] Formatting of this note might be different from the original. Last Assessment & Plan:  Formatting of this note might be different from the original. 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    Esophagitis 07/21/2015    Falls     Gastroparesis     GERD (gastroesophageal reflux disease)     History of shingles 2010    History of transfusion 02/2018    no adverse reaction    Hyperlipidemia     Hyperphosphatemia     Hypertension     Hypoglycemia 07/15/2022    Itching     Mastoiditis of right side  07/15/2022    Muscle weakness     general unsteadiness    Obesity (BMI 30.0-34.9) 09/09/2019    Orthostatic hypotension 10/25/2020    Peripheral polyneuropathy 11/20/2019    PONV (postoperative nausea and vomiting) 01/26/2018    Protein-calorie malnutrition (HCC) 11/23/2020    Recurrent peritonitis ([facility]) due to peritoneal dialysis catheter 07/31/2020    Retinopathy     Seizures ([facility])     early 2020 - one time    Skin abnormality     some dime size areas where skin was scratched from itching    Sleep apnea     Spontaneous bacterial peritonitis ([facility]) 10/19/2020    Squamous cell skin cancer     left temple    Stroke ([facility])     x2 - off balance/no driving/fatigue    Swelling of both lower extremities     Swelling of joint of upper arm, right 04/03/2024    Traumatic onycholysis 07/21/2022    Vomiting     Wears glasses     Word finding difficulty 11/05/2024

## 2025-01-17 ENCOUNTER — TELEPHONE (OUTPATIENT)
Age: 40
End: 2025-01-17

## 2025-01-17 ENCOUNTER — APPOINTMENT (EMERGENCY)
Dept: CT IMAGING | Facility: HOSPITAL | Age: 40
End: 2025-01-17
Payer: MEDICARE

## 2025-01-17 ENCOUNTER — HOSPITAL ENCOUNTER (EMERGENCY)
Facility: HOSPITAL | Age: 40
Discharge: HOME | End: 2025-01-17
Attending: EMERGENCY MEDICINE
Payer: MEDICARE

## 2025-01-17 VITALS
OXYGEN SATURATION: 100 % | SYSTOLIC BLOOD PRESSURE: 162 MMHG | TEMPERATURE: 97.8 F | DIASTOLIC BLOOD PRESSURE: 74 MMHG | RESPIRATION RATE: 16 BRPM | HEART RATE: 69 BPM

## 2025-01-17 DIAGNOSIS — I10 HIGH BLOOD PRESSURE: ICD-10-CM

## 2025-01-17 DIAGNOSIS — R42 VERTIGO: ICD-10-CM

## 2025-01-17 DIAGNOSIS — R42 LIGHTHEADEDNESS: Primary | ICD-10-CM

## 2025-01-17 LAB
ALBUMIN SERPL BCG-MCNC: 4.1 G/DL (ref 3.5–5)
ALP SERPL-CCNC: 75 U/L (ref 34–104)
ALT SERPL W P-5'-P-CCNC: 8 U/L (ref 7–52)
ANION GAP SERPL CALCULATED.3IONS-SCNC: 12 MMOL/L (ref 4–13)
AST SERPL W P-5'-P-CCNC: 15 U/L (ref 13–39)
ATRIAL RATE: 69 BPM
BASOPHILS # BLD AUTO: 0.07 THOUSANDS/ΜL (ref 0–0.1)
BASOPHILS NFR BLD AUTO: 1 % (ref 0–1)
BILIRUB SERPL-MCNC: 0.57 MG/DL (ref 0.2–1)
BUN SERPL-MCNC: 37 MG/DL (ref 5–25)
CALCIUM SERPL-MCNC: 9 MG/DL (ref 8.4–10.2)
CHLORIDE SERPL-SCNC: 95 MMOL/L (ref 96–108)
CO2 SERPL-SCNC: 30 MMOL/L (ref 21–32)
CREAT SERPL-MCNC: 9.42 MG/DL (ref 0.6–1.3)
EOSINOPHIL # BLD AUTO: 0.36 THOUSAND/ΜL (ref 0–0.61)
EOSINOPHIL NFR BLD AUTO: 7 % (ref 0–6)
ERYTHROCYTE [DISTWIDTH] IN BLOOD BY AUTOMATED COUNT: 15.8 % (ref 11.6–15.1)
GFR SERPL CREATININE-BSD FRML MDRD: 6 ML/MIN/1.73SQ M
GLUCOSE SERPL-MCNC: 153 MG/DL (ref 65–140)
GLUCOSE SERPL-MCNC: 154 MG/DL (ref 65–140)
HCT VFR BLD AUTO: 36.7 % (ref 36.5–49.3)
HGB BLD-MCNC: 12 G/DL (ref 12–17)
IMM GRANULOCYTES # BLD AUTO: 0.02 THOUSAND/UL (ref 0–0.2)
IMM GRANULOCYTES NFR BLD AUTO: 0 % (ref 0–2)
LYMPHOCYTES # BLD AUTO: 1.31 THOUSANDS/ΜL (ref 0.6–4.47)
LYMPHOCYTES NFR BLD AUTO: 27 % (ref 14–44)
MAGNESIUM SERPL-MCNC: 2.7 MG/DL (ref 1.9–2.7)
MCH RBC QN AUTO: 32.6 PG (ref 26.8–34.3)
MCHC RBC AUTO-ENTMCNC: 32.7 G/DL (ref 31.4–37.4)
MCV RBC AUTO: 100 FL (ref 82–98)
METHYLMALONATE SERPL-SCNC: 879 NMOL/L (ref 0–378)
MONOCYTES # BLD AUTO: 0.42 THOUSAND/ΜL (ref 0.17–1.22)
MONOCYTES NFR BLD AUTO: 9 % (ref 4–12)
NEUTROPHILS # BLD AUTO: 2.77 THOUSANDS/ΜL (ref 1.85–7.62)
NEUTS SEG NFR BLD AUTO: 56 % (ref 43–75)
NRBC BLD AUTO-RTO: 0 /100 WBCS
P AXIS: 37 DEGREES
PLATELET # BLD AUTO: 222 THOUSANDS/UL (ref 149–390)
PMV BLD AUTO: 11.1 FL (ref 8.9–12.7)
POTASSIUM SERPL-SCNC: 5.1 MMOL/L (ref 3.5–5.3)
PR INTERVAL: 176 MS
PROT SERPL-MCNC: 6.4 G/DL (ref 6.4–8.4)
QRS AXIS: 28 DEGREES
QRSD INTERVAL: 74 MS
QT INTERVAL: 444 MS
QTC INTERVAL: 475 MS
RBC # BLD AUTO: 3.68 MILLION/UL (ref 3.88–5.62)
SODIUM SERPL-SCNC: 137 MMOL/L (ref 135–147)
T WAVE AXIS: 118 DEGREES
VENTRICULAR RATE: 69 BPM
WBC # BLD AUTO: 4.95 THOUSAND/UL (ref 4.31–10.16)

## 2025-01-17 PROCEDURE — 85025 COMPLETE CBC W/AUTO DIFF WBC: CPT

## 2025-01-17 PROCEDURE — 80053 COMPREHEN METABOLIC PANEL: CPT

## 2025-01-17 PROCEDURE — 70450 CT HEAD/BRAIN W/O DYE: CPT

## 2025-01-17 PROCEDURE — 93005 ELECTROCARDIOGRAM TRACING: CPT

## 2025-01-17 PROCEDURE — 83735 ASSAY OF MAGNESIUM: CPT

## 2025-01-17 PROCEDURE — 99285 EMERGENCY DEPT VISIT HI MDM: CPT | Performed by: EMERGENCY MEDICINE

## 2025-01-17 PROCEDURE — 99284 EMERGENCY DEPT VISIT MOD MDM: CPT

## 2025-01-17 PROCEDURE — 82948 REAGENT STRIP/BLOOD GLUCOSE: CPT

## 2025-01-17 PROCEDURE — 36415 COLL VENOUS BLD VENIPUNCTURE: CPT

## 2025-01-17 PROCEDURE — 93010 ELECTROCARDIOGRAM REPORT: CPT | Performed by: INTERNAL MEDICINE

## 2025-01-17 RX ORDER — GABAPENTIN 100 MG/1
100 CAPSULE ORAL ONCE
Status: COMPLETED | OUTPATIENT
Start: 2025-01-17 | End: 2025-01-17

## 2025-01-17 RX ORDER — ONDANSETRON 4 MG/1
4 TABLET, FILM COATED ORAL EVERY 8 HOURS PRN
Qty: 90 TABLET | Refills: 1 | Status: SHIPPED | OUTPATIENT
Start: 2025-01-17

## 2025-01-17 RX ORDER — LABETALOL 100 MG/1
200 TABLET, FILM COATED ORAL ONCE
Status: COMPLETED | OUTPATIENT
Start: 2025-01-17 | End: 2025-01-17

## 2025-01-17 RX ORDER — ONDANSETRON 4 MG/1
4 TABLET, ORALLY DISINTEGRATING ORAL ONCE
Status: COMPLETED | OUTPATIENT
Start: 2025-01-17 | End: 2025-01-17

## 2025-01-17 RX ORDER — TORSEMIDE 100 MG/1
100 TABLET ORAL ONCE
Status: COMPLETED | OUTPATIENT
Start: 2025-01-17 | End: 2025-01-17

## 2025-01-17 RX ORDER — FAMOTIDINE 20 MG/1
40 TABLET, FILM COATED ORAL ONCE
Status: COMPLETED | OUTPATIENT
Start: 2025-01-17 | End: 2025-01-17

## 2025-01-17 RX ADMIN — LABETALOL HYDROCHLORIDE 200 MG: 100 TABLET, FILM COATED ORAL at 07:37

## 2025-01-17 RX ADMIN — GABAPENTIN 100 MG: 100 CAPSULE ORAL at 07:36

## 2025-01-17 RX ADMIN — TORSEMIDE 100 MG: 100 TABLET ORAL at 07:35

## 2025-01-17 RX ADMIN — NIFEDIPINE 90 MG: 30 TABLET, FILM COATED, EXTENDED RELEASE ORAL at 07:35

## 2025-01-17 RX ADMIN — FAMOTIDINE 40 MG: 20 TABLET, FILM COATED ORAL at 07:34

## 2025-01-17 RX ADMIN — LABETALOL HYDROCHLORIDE 200 MG: 100 TABLET, FILM COATED ORAL at 07:38

## 2025-01-17 RX ADMIN — ONDANSETRON 4 MG: 4 TABLET, ORALLY DISINTEGRATING ORAL at 09:34

## 2025-01-17 NOTE — ED ATTENDING ATTESTATION
1/17/2025  I, Francisco Vogt DO, saw and evaluated the patient. I have discussed the patient with the resident/non-physician practitioner and agree with the resident's/non-physician practitioner's findings, Plan of Care, and MDM as documented in the resident's/non-physician practitioner's note, except where noted. All available labs and Radiology studies were reviewed.  I was present for key portions of any procedure(s) performed by the resident/non-physician practitioner and I was immediately available to provide assistance.       At this point I agree with the current assessment done in the Emergency Department.  I have conducted an independent evaluation of this patient a history and physical is as follows:    Patient is a 39-year-old male with history of hypertension, end-stage renal disease on hemodialysis who presents with lightheadedness.  Patient was discharged from the hospital on 1/13/2025.  He was admitted at that time for elevated blood pressure.  Since discharge, he has had a follow-up appointment with his PCP and has had both physical and occupational therapy.  Patient states that he has noticed lightheadedness with change in position and activity.  He did not have this lightheadedness in the hospital because he states that he was laying in a bed for the most part.  He denies vertigo.  He denies new speech changes, vision changes, weakness, numbness, tingling, other concerns.  No recent falls.  Patient has continued to take his medications as directed.  He is supposed to have dialysis today as he gets it Monday, Wednesday and Fridays.    On exam, patient is in no acute distress.  Heart is regular rate and rhythm.  Breath sounds normal.  Abdomen is soft, nontender, nondistended.  No rebound or guarding.  Cranial nerves II through XII grossly intact.  Motor, sensation normal.  Speech fluent.  Visual fields intact.    Presentation appears to be persistence of chronic symptoms.  No acute infectious or  "metabolic derangements.  CT head is unchanged.  Patient was evaluated by nephrology and they will ensure that he gets his outpatient hemodialysis.  Patient and parents are comfortable with discharge and will ensure that he gets to his dialysis center.  I do not believe that patient has an acute or subacute stroke.  I do not believe that hospitalization for additional workup will be beneficial.  I feel that he is appropriate for discharge at this time.    Portions of the above record have been created with voice recognition software.  Occasional wrong word or \"sound alike\" substitutions may have occurred due to the inherent limitations of voice recognition software.  Read the chart carefully and recognize, using context, where substitutions may have occurred.      ED Course         Critical Care Time  Procedures      "

## 2025-01-17 NOTE — TELEPHONE ENCOUNTER
Reason for call:   [x] Refill   [] Prior Auth  [] Other:     Office:   [x] PCP/Provider - Dania Sher   [] Specialty/Provider -     Medication: Ondansetron    Dose/Frequency: 4 mg Q 8 HRS PRN    Quantity: 90    Pharmacy: [facility] [place] [address]     Does the patient have enough for 3 days?   [x] Yes   [] No - Send as HP to POD

## 2025-01-17 NOTE — TELEPHONE ENCOUNTER
Kulwinder- Father - Patients father wanted to refill medications for the patient, patient is in the ED, once he gets discharged we are able to put refills in, father will call us back.

## 2025-01-17 NOTE — ED NOTES
Meds re-requested from pharmacy, not located in tube system in ED.       Miguel Lopes, MIKAEL  01/17/25 [id number]

## 2025-01-17 NOTE — ED PROVIDER NOTES
Time reflects when diagnosis was documented in both MDM as applicable and the Disposition within this note       Time User Action Codes Description Comment    1/17/2025  9:31 AM Holden Jimenez [R42] Lightheadedness     1/17/2025  9:31 AM Holden Jimenez [I10] High blood pressure           ED Disposition       ED Disposition   Discharge    Condition   Stable    Date/Time   Fri Jan 17, 2025  9:31 AM    Comment   Mateus Mckeon discharge to home/self care.                   Assessment & Plan       Medical Decision Making  39-year-old male presenting to the ED for general unwellness.    Patient has no strokelike symptoms, no slurred speech, no facial droop, no arm drift.  Strength bilaterally, cranial nerves II through XII intact, intact gait.    Patient states his symptoms are primarily from sitting to standing he feels mildly lightheaded but did not feel this when he was in the hospital.  Patient does admit while in the hospital however he was in bed most of the time.    Considering hypoglycemia, electrolyte abnormality, metabolic disturbance, arrhythmia, orthostatic hypotension.    Patient's hypertensive medication regimen had been changed and he has been compliant throughout the week stating that his blood pressures have been normal.    Will get patient's 7 AM medicines and do CBC, CMP, magnesium.  Will continue to reevaluate patient and blood pressure.    Following workup, patient CT scan showed chronic infarcts with no new or acute infarcts or hemorrhages.  I discussed blood work with patient and patient's mother.  Patient was seen by advanced practitioner from nephrology to discuss dialysis.  Patient's dialysis appointment at 12 PM.  At this time, I do not feel that patient required admission and could continue following up outpatient with his hypertension which is well-known and actively being managed.    After shared decision making conversation with family and patient, patient was  comfortable with discharge.  Patient at time of discharge was ambulatory and stable with improved blood pressure.    Patient discharged.    Amount and/or Complexity of Data Reviewed  Labs: ordered. Decision-making details documented in ED Course.  Radiology: ordered.    Risk  Prescription drug management.        ED Course as of 01/17/25 0943   Fri Jan 17, 2025 0706 CBC and differential(!)  No signs of elevated white count, no leukocytosis, no signs of anemia no signs of elevated white count, no leukocytosis, no signs of anemia.  Reassuring CBC.     0706 POC Glucose(!): 154  Has an insulin pump and Dexcom   0718 Creatinine(!): 9.42  Patient's end-stage renal disease on dialysis, creatinine mildly improved from previous admission.   0718 BUN(!): 37  Patient end-stage renal disease on dialysis.   0718 GLUCOSE(!): 153  Type II diabetic with Dexcom and insulin pump on board   0718 MAGNESIUM: 2.7  Reassuring   0721 Patient's lab work generally unremarkable.  EKG interpreted by provider.  This time we will continue to monitor patient's blood pressure and give morning medications.   0828 After an extensive shared decision-making conversation with the patient it was decided that a CT Noncon would be ordered.  Patient's concern is that he was having a hemorrhagic stroke due to his high blood pressure.  Full neuroexam was completed with no signs of deficits however given patient's concern, will order CT Noncon at this time.   0928 IMPRESSION:     1. No acute intracranial abnormality.     2. Unchanged chronic lacunar infarcts.                 Resident: BLAINE DAS I, the attending radiologist, have reviewed the images and agree with the final report above.     Workstation performed: [id number]         Medications   NIFEdipine (PROCARDIA XL) 24 hr tablet 90 mg (90 mg Oral Given 1/17/25 0735)   famotidine (PEPCID) tablet 40 mg (40 mg Oral Given 1/17/25 0734)   gabapentin (NEURONTIN) capsule 100 mg (100 mg Oral Given  1/17/25 0736)   labetalol (NORMODYNE) tablet 200 mg (200 mg Oral Given 1/17/25 0737)   torsemide (DEMADEX) tablet 100 mg (100 mg Oral Given 1/17/25 0735)   labetalol (NORMODYNE) tablet 200 mg (200 mg Oral Given 1/17/25 0738)   ondansetron (ZOFRAN-ODT) dispersible tablet 4 mg (4 mg Oral Given 1/17/25 0934)       ED Risk Strat Scores                                              History of Present Illness       Chief Complaint   Patient presents with    Dizziness     Patient complaining of dizziness and feeling confused at times since Monday. Hx of stroke, that started with similar symptoms.        Past Medical History:   Diagnosis Date    Acute kidney injury (HCC)     Ambulates with cane     Anuria     Anxiety     Cellulitis of right elbow 03/31/2021    Chronic kidney disease     COVID-19 10/18/2024    Depression     Diabetes mellitus (HCC)     Diarrhea     Emesis 10/24/2020    End stage renal disease (HCC) 02/11/2018    Formatting of this note might be different from the original. Last Assessment & Plan:  Secondary to DM.  On nightly PD.  Followed by Nephro.  Patient considering transplant for kidney and pancreas through [facility] Formatting of this note might be different from the original. Last Assessment & Plan:  Formatting of this note might be different from the original. 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    Esophagitis 07/21/2015    Falls     Gastroparesis     GERD (gastroesophageal reflux disease)     History of shingles 2010    History of transfusion 02/2018    no adverse reaction    Hyperlipidemia     Hyperphosphatemia     Hypertension     Hypoglycemia 07/15/2022    Itching     Mastoiditis of right side 07/15/2022    Muscle weakness     general unsteadiness    Obesity (BMI 30.0-34.9) 09/09/2019    Orthostatic hypotension 10/25/2020    Peripheral polyneuropathy 11/20/2019    PONV (postoperative nausea and vomiting) 01/26/2018    Protein-calorie malnutrition (HCC)  11/23/2020    Recurrent peritonitis (HCC) due to peritoneal dialysis catheter 07/31/2020    Retinopathy     Seizures (HCC)     early 2020 - one time    Skin abnormality     some dime size areas where skin was scratched from itching    Sleep apnea     Spontaneous bacterial peritonitis (HCC) 10/19/2020    Squamous cell skin cancer     left temple    Stroke (HCC)     x2 - off balance/no driving/fatigue    Swelling of both lower extremities     Swelling of joint of upper arm, right 04/03/2024    Traumatic onycholysis 07/21/2022    Vomiting     Wears glasses     Word finding difficulty 11/05/2024      Past Surgical History:   Procedure Laterality Date    CARDIAC ELECTROPHYSIOLOGY PROCEDURE N/A 9/21/2023    Procedure: Cardiac loop recorder explant;  Surgeon: Parish Morgan MD;  Location: [facility];  Service: Cardiology    CARDIAC LOOP RECORDER  05/2018    COLONOSCOPY      EGD      EYE SURGERY Right     HEMODIALYSIS ADULT  11/6/2024    IR AV FISTULAGRAM/GRAFTOGRAM  02/23/2021    IR CEREBRAL ANGIOGRAPHY  1/12/2024    IR CEREBRAL ANGIOGRAPHY / INTERVENTION  1/5/2024    IR TUNNELED CENTRAL LINE PLACEMENT  02/16/2021    IR TUNNELED DIALYSIS CATHETER PLACEMENT  11/18/2020    IR TUNNELED DIALYSIS CATHETER REMOVAL  02/12/2021    IR TUNNELED DIALYSIS CATHETER REMOVAL  03/11/2021    MOHS SURGERY Left 12/14/2022    Left temple with Dr. Hassan    PERITONEAL CATHETER INSERTION N/A 08/27/2018    Procedure: UNROOF PD CATHETER;  Surgeon: Felipe Lindo DO;  Location: [facility];  Service: General    MI ARTERIOVENOUS ANASTOMOSIS OPEN DIRECT Left 11/09/2020    Procedure: CREATION FISTULA  ARTERIOVENOUS (AV) - LEFT WRIST;  Surgeon: Placido Altamirano MD;  Location: [facility];  Service: Vascular    MI ESOPHAGOGASTRODUODENOSCOPY TRANSORAL DIAGNOSTIC N/A 04/18/2019    Procedure: ESOPHAGOGASTRODUODENOSCOPY (EGD);  Surgeon: Ale Figueroa MD;  Location: [facility];  Service: Gastroenterology    MI LAPS INSERTION TUNNELED INTRAPERITONEAL  CATHETER N/A 2018    Procedure: LAPAROSCOPIC PD CATHETER PLACEMENT;  Surgeon: Felipe Lindo DO;  Location[facility];  Service: General    MD REMOVAL TUNNELED INTRAPERITONEAL CATHETER N/A 2020    Procedure: REMOVAL CATHETER PERITONEAL DIALYSIS;  Surgeon: Abdifatah Ty MD;  Location[facility];  Service: General    TONSILLECTOMY      UPPER GASTROINTESTINAL ENDOSCOPY        Family History   Problem Relation Age of Onset    Breast cancer Mother     Hypertension Mother     Hyperlipidemia Father     Hypertension Father     Leukemia Maternal Grandmother     Hyperlipidemia Maternal Grandfather     Hypertension Maternal Grandfather     Hyperlipidemia Paternal Grandmother     Hypertension Paternal Grandmother     Heart disease Paternal Grandfather         cardiac disorder    Diabetes Paternal Grandfather       Social History     Tobacco Use    Smoking status: Former     Current packs/day: 0.00     Average packs/day: 0.5 packs/day for 12.0 years (6.0 ttl pk-yrs)     Types: Cigarettes     Start date: 2006     Quit date: 2018     Years since quittin.9    Smokeless tobacco: Never    Tobacco comments:     quit 2018   Vaping Use    Vaping status: Every Day    Substances: THC, CBD   Substance Use Topics    Alcohol use: Not Currently    Drug use: Yes     Types: Marijuana     Comment: medical marijuana last vaped 2024      E-Cigarette/Vaping    E-Cigarette Use Current Every Day User     Comments medical marijuana       E-Cigarette/Vaping Substances    Nicotine No     THC Yes     CBD Yes     Flavoring No     Other No     Unknown No       I have reviewed and agree with the history as documented.     39-year-old male with significant history of stroke, hypertensive urgency, seizures, diabetes, end-stage renal disease who receives dialysis  presenting to the ED 4 days after being discharged from the hospital for complaint of general unwellness.  Patient states that he has been having  lightheadedness when going from a seated to standing position and has felt generally weak since leaving the hospital.  Patient felt that he did not have the symptoms in the hospital but also states that he knows he was laying in bed most of time while in the hospital.  Patient is nervous because during one of his strokes he had similar symptoms where he was dizzy.  When discussing symptoms now however, patient states symptoms have been persistent since leaving the ER and nothing has changed over the past 12 to 24 hours.  Patient's blood sugars have been maintained appropriately and blood pressure has been maintained appropriate on new regiment at home.        Review of Systems   Constitutional:  Negative for chills and fever.   HENT:  Negative for congestion and rhinorrhea.    Respiratory:  Negative for chest tightness and shortness of breath.    Cardiovascular:  Negative for chest pain and palpitations.   Gastrointestinal:  Negative for abdominal pain, constipation, diarrhea and vomiting.   Endocrine: Negative for polyuria.   Genitourinary:  Negative for dysuria.   Musculoskeletal:  Negative for back pain and myalgias.   Skin:  Negative for color change.   Neurological:  Positive for light-headedness. Negative for dizziness, tremors, seizures, syncope, facial asymmetry, speech difficulty, weakness, numbness and headaches.   Psychiatric/Behavioral:  Negative for agitation and confusion.            Objective       ED Triage Vitals   Temperature Pulse Blood Pressure Respirations SpO2 Patient Position - Orthostatic VS   01/17/25 0628 01/17/25 0626 01/17/25 0626 01/17/25 0626 01/17/25 0626 01/17/25 0626   97.8 °F (36.6 °C) 69 (!) 180/94 18 100 % Lying      Temp Source Heart Rate Source BP Location FiO2 (%) Pain Score    01/17/25 0626 01/17/25 0626 01/17/25 0626 -- --    Oral Monitor Right arm        Vitals      Date and Time Temp Pulse SpO2 Resp BP Pain Score FACES Pain Rating User   01/17/25 0932 -- 69 100 % -- 162/74  -- --    01/17/25 0830 -- 65 97 % 16 179/83 -- -- [facility]   01/17/25 0800 -- 64 97 % 18 183/85 -- -- [facility]   01/17/25 0730 -- 66 97 % -- 188/87 -- -- [facility]   01/17/25 0725 -- 65 99 % -- -- -- --    01/17/25 0628 97.8 °F (36.6 °C) -- -- -- -- -- --    01/17/25 0626 -- 69 100 % 18 180/94 -- --             Physical Exam  Constitutional:       Appearance: Normal appearance.   HENT:      Head: Normocephalic and atraumatic.      Right Ear: External ear normal.      Left Ear: External ear normal.      Nose: Nose normal.      Mouth/Throat:      Pharynx: Oropharynx is clear.   Eyes:      Extraocular Movements: Extraocular movements intact.      Pupils: Pupils are equal, round, and reactive to light.   Cardiovascular:      Rate and Rhythm: Normal rate and regular rhythm.      Pulses: Normal pulses.      Heart sounds: Normal heart sounds.   Pulmonary:      Breath sounds: Normal breath sounds.   Abdominal:      General: Bowel sounds are normal.      Palpations: Abdomen is soft.   Musculoskeletal:         General: Normal range of motion.      Cervical back: Normal range of motion.   Skin:     General: Skin is warm.      Capillary Refill: Capillary refill takes less than 2 seconds.   Neurological:      General: No focal deficit present.      Mental Status: He is alert and oriented to person, place, and time.      GCS: GCS eye subscore is 4. GCS verbal subscore is 5. GCS motor subscore is 6.      Cranial Nerves: Cranial nerves 2-12 are intact.      Sensory: Sensation is intact.      Motor: Motor function is intact.      Coordination: Coordination is intact.      Gait: Gait is intact.   Psychiatric:         Mood and Affect: Mood normal.         Behavior: Behavior normal.         Results Reviewed       Procedure Component Value Units Date/Time    Comprehensive metabolic panel [[id number]]  (Abnormal) Collected: 01/17/25 0642    Lab Status: Final result Specimen: Blood from Arm, Right Updated: 01/17/25 0711     Sodium 137 mmol/L       Potassium 5.1 mmol/L      Chloride 95 mmol/L      CO2 30 mmol/L      ANION GAP 12 mmol/L      BUN 37 mg/dL      Creatinine 9.42 mg/dL      Glucose 153 mg/dL      Calcium 9.0 mg/dL      AST 15 U/L      ALT 8 U/L      Alkaline Phosphatase 75 U/L      Total Protein 6.4 g/dL      Albumin 4.1 g/dL      Total Bilirubin 0.57 mg/dL      eGFR 6 ml/min/1.73sq m     Narrative:      National Kid[employer] Disease Foundation guidelines for Chronic Kidney Disease (CKD):     Stage 1 with normal or high GFR (GFR > 90 mL/min/1.73 square meters)    Stage 2 Mild CKD (GFR = 60-89 mL/min/1.73 square meters)    Stage 3A Moderate CKD (GFR = 45-59 mL/min/1.73 square meters)    Stage 3B Moderate CKD (GFR = 30-44 mL/min/1.73 square meters)    Stage 4 Severe CKD (GFR = 15-29 mL/min/1.73 square meters)    Stage 5 End Stage CKD (GFR <15 mL/min/1.73 square meters)  Note: GFR calculation is accurate only with a steady state creatinine    Magnesium [[id number]]  (Normal) Collected: 01/17/25 0642    Lab Status: Final result Specimen: Blood from Arm, Right Updated: 01/17/25 0711     Magnesium 2.7 mg/dL     CBC and differential [[id number]]  (Abnormal) Collected: 01/17/25 0642    Lab Status: Final result Specimen: Blood from Arm, Right Updated: 01/17/25 0652     WBC 4.95 Thousand/uL      RBC 3.68 Million/uL      Hemoglobin 12.0 g/dL      Hematocrit 36.7 %       fL      MCH 32.6 pg      MCHC 32.7 g/dL      RDW 15.8 %      MPV 11.1 fL      Platelets 222 Thousands/uL      nRBC 0 /100 WBCs      Segmented % 56 %      Immature Grans % 0 %      Lymphocytes % 27 %      Monocytes % 9 %      Eosinophils Relative 7 %      Basophils Relative 1 %      Absolute Neutrophils 2.77 Thousands/µL      Absolute Immature Grans 0.02 Thousand/uL      Absolute Lymphocytes 1.31 Thousands/µL      Absolute Monocytes 0.42 Thousand/µL      Eosinophils Absolute 0.36 Thousand/µL      Basophils Absolute 0.07 Thousands/µL     Fingerstick Glucose (POCT) [[id number]]  (Abnormal)  Collected: 01/17/25 0634    Lab Status: Final result Specimen: Blood Updated: 01/17/25 0636     POC Glucose 154 mg/dl             CT head wo contrast   Final Interpretation by Delgado Li DO (01/17 0925)      1. No acute intracranial abnormality.      2. Unchanged chronic lacunar infarcts.                  Resident: BLAINE DAS I, the attending radiologist, have reviewed the images and agree with the final report above.      Workstation performed: [id number]             ECG 12 Lead Documentation Only    Date/Time: 1/17/2025 6:45 AM    Performed by: Holden Singh MD  Authorized by: Holden Singh MD    Indications / Diagnosis:  Hypertension  ECG reviewed by me, the ED Provider: yes    Patient location:  ED  Previous ECG:     Previous ECG:  Compared to current    Similarity:  Changes noted  Interpretation:     Interpretation: abnormal    Rate:     ECG rate:  69    ECG rate assessment: normal    Rhythm:     Rhythm: sinus rhythm    Ectopy:     Ectopy: none    QRS:     QRS axis:  Normal    QRS intervals:  Normal  Conduction:     Conduction: normal    ST segments:     ST segments:  Normal  T waves:     T waves: non-specific and inverted      Inverted:  AVL  Comments:      Noted for stroke of T waves from previous EKG.  aVL T waves are now negative in V6 T waves are now positive.  Nonspecific changes.  Otherwise sinus rhythm with normal rate.  No signs of ST elevations or depression.  No signs of MI mimic.      ED Medication and Procedure Management   Prior to Admission Medications   Prescriptions Last Dose Informant Patient Reported? Taking?   GLUCAGON EMERGENCY 1 MG injection  Self, Family Member Yes No   Gvoke HypoPen 1-Pack 1 MG/0.2ML SOAJ  Self, Family Member Yes No   Sig: as needed   Insulin Disposable Pump (Omnipod 5 G6 Intro, Gen 5,) KIT  Self, Family Member Yes No   Insulin Disposable Pump (Omnipod 5 G6 Pod, Gen 5,) MISC  Self, Family Member Yes No   NIFEdipine (PROCARDIA XL) 90 mg  24 hr tablet   No No   Sig: Take 1 tablet (90 mg total) by mouth 2 (two) times a day   NovoLOG 100 UNIT/ML injection  Self, Family Member Yes No   Patient not taking: Reported on 11/19/2024   Patiromer Sorbitex Calcium (VELTASSA PO)  Self, Family Member Yes No   Sig: Take 5 g by mouth once a week   SPS 15 GM/60ML suspension  Self, Family Member Yes No   Sig: TAKE 60 ML BY MOUTH SINGLE DOSE FOR EMERGENCY USE DURING MISSED HEMODIALYSIS   aspirin (ECOTRIN LOW STRENGTH) 81 mg EC tablet  Self, Family Member Yes No   Sig: Take 81 mg by mouth daily   atorvastatin (LIPITOR) 40 mg tablet  Self, Family Member No No   Sig: TAKE 1 TABLET BY MOUTH ONCE DAILY IN THE EVENING   b complex vitamins capsule  Self, Family Member Yes No   Sig: Take 1 capsule by mouth daily before lunch    calcium acetate (PHOSLO) capsule  Self, Family Member No No   Sig: Take 1 capsule (667 mg total) by mouth 3 (three) times a day   cinacalcet (SENSIPAR) 60 MG tablet  Self, Family Member No No   Sig: Take 1 tablet (60 mg total) by mouth daily   cloNIDine (CATAPRES) 0.2 mg tablet   No No   Sig: Take 1 tablet (0.2 mg total) by mouth every 8 (eight) hours   doxazosin (CARDURA) 4 mg tablet   No No   Sig: Take 1 tablet (4 mg total) by mouth daily   escitalopram (LEXAPRO) 20 mg tablet   No No   Sig: Take 1 tablet by mouth once daily   famotidine (PEPCID) 10 mg tablet   No No   Sig: Take 1 tablet (10 mg total) by mouth daily   gabapentin (NEURONTIN) 100 mg capsule  Family Member No No   Sig: TAKE 1 CAPSULE BY MOUTH IN THE MORNING AND 2 AT BEDTIME   hydrALAZINE (APRESOLINE) 100 MG tablet   No No   Sig: Take 1 tablet (100 mg total) by mouth every 8 (eight) hours   hydrOXYzine HCL (ATARAX) 10 mg tablet  Self, Family Member No No   Sig: Take 1 tablet (10 mg total) by mouth every 6 (six) hours as needed for itching or anxiety   hyoscyamine (ANASPAZ,LEVSIN) 0.125 MG tablet  Family Member No No   Sig: Take 1 tablet (0.125 mg total) by mouth every 4 (four) hours as  needed for cramping   labetalol (NORMODYNE) 200 mg tablet   No No   Sig: Take 2 tablets (400 mg total) by mouth 3 (three) times a day   losartan (COZAAR) 100 MG tablet   No No   Sig: Take 1 tablet (100 mg total) by mouth daily at bedtime   ondansetron (ZOFRAN) 4 mg tablet  Self, Family Member No No   Sig: Take 1 tablet (4 mg total) by mouth every 8 (eight) hours as needed for nausea or vomiting   torsemide (DEMADEX) 100 mg tablet   No No   Sig: Take 1 tablet (100 mg total) by mouth daily Do not start before January 8, 2025.   traZODone (DESYREL) 50 mg tablet  Family Member No No   Sig: TAKE 1/2 (ONE-HALF) TABLET BY MOUTH ONCE DAILY AT BEDTIME AS NEEDED FOR SLEEP      Facility-Administered Medications: None     Patient's Medications   Discharge Prescriptions    No medications on file     No discharge procedures on file.  ED SEPSIS DOCUMENTATION   Time reflects when diagnosis was documented in both MDM as applicable and the Disposition within this note       Time User Action Codes Description Comment    1/17/2025  9:31 AM Holden Singh [R42] Lightheadedness     1/17/2025  9:31 AM Holden Singh [I10] High blood pressure                  Holden Singh MD  01/17/25 0993

## 2025-01-20 ENCOUNTER — VBI (OUTPATIENT)
Age: 40
End: 2025-01-20

## 2025-01-20 ENCOUNTER — PATIENT OUTREACH (OUTPATIENT)
Dept: CASE MANAGEMENT | Facility: OTHER | Age: 40
End: 2025-01-20

## 2025-01-20 DIAGNOSIS — Z91.89 HIGH RISK FOR READMISSION: Primary | ICD-10-CM

## 2025-01-20 NOTE — TELEPHONE ENCOUNTER
01/20/25 9:19 AM    Patient contacted post ED visit, [facility] department spoke with patient/caregiver and outreach was successful.    Thank you.  DOMI MOTTA MA  [facility]

## 2025-01-20 NOTE — PROGRESS NOTES
Spoke briefly with Paulo. Reports he still has some dizziness. Blood pressure readings per Paulo are up and down. He is currently at dialysis saw his PCP last week. Offered PCP appointment he declined. Ended call because he was at dialysis.

## 2025-01-21 ENCOUNTER — APPOINTMENT (OUTPATIENT)
Facility: CLINIC | Age: 40
End: 2025-01-21
Payer: MEDICARE

## 2025-01-21 ENCOUNTER — OFFICE VISIT (OUTPATIENT)
Facility: CLINIC | Age: 40
End: 2025-01-21
Payer: MEDICARE

## 2025-01-21 DIAGNOSIS — I63.89 CEREBROVASCULAR ACCIDENT (CVA) DUE TO OTHER MECHANISM (HCC): Primary | ICD-10-CM

## 2025-01-21 DIAGNOSIS — N18.6 END STAGE RENAL DISEASE ON DIALYSIS (HCC): ICD-10-CM

## 2025-01-21 DIAGNOSIS — Z99.2 END STAGE RENAL DISEASE ON DIALYSIS (HCC): ICD-10-CM

## 2025-01-21 DIAGNOSIS — R53.1 GENERALIZED WEAKNESS: ICD-10-CM

## 2025-01-21 DIAGNOSIS — Z91.81 STATUS POST FALL: Primary | ICD-10-CM

## 2025-01-21 DIAGNOSIS — I63.9 CEREBROVASCULAR ACCIDENT (CVA), UNSPECIFIED MECHANISM (HCC): ICD-10-CM

## 2025-01-21 DIAGNOSIS — R26.89 OTHER ABNORMALITIES OF GAIT AND MOBILITY: ICD-10-CM

## 2025-01-21 PROCEDURE — 97530 THERAPEUTIC ACTIVITIES: CPT | Performed by: PHYSICAL THERAPIST

## 2025-01-21 PROCEDURE — 97110 THERAPEUTIC EXERCISES: CPT | Performed by: PHYSICAL THERAPIST

## 2025-01-21 PROCEDURE — 97110 THERAPEUTIC EXERCISES: CPT

## 2025-01-21 PROCEDURE — 97530 THERAPEUTIC ACTIVITIES: CPT

## 2025-01-21 PROCEDURE — 97112 NEUROMUSCULAR REEDUCATION: CPT

## 2025-01-21 NOTE — PROGRESS NOTES
OT Daily Note     Today's date: 2025  Patient name: Mateus Mckeon  : 1985  MRN: [id number]  Referring provider: Dania Sher DO  Dx:   Encounter Diagnosis     ICD-10-CM    1. Cerebrovascular accident (CVA) due to other mechanism (HCC)  I63.89       2. Generalized weakness  R53.1       3. End stage renal disease on dialysis (HCC)  N18.6     Z99.2                   Start Time: 0800  Stop Time: 0845  Total time in clinic (min): 45 minutes    PLAN OF CARE START: 2025  PLAN OF CARE END: 2025  FREQUENCY: 2x/wk  PRECAUTIONS: Renal failure, actively going through dialysis; type 1 diabetes; HTN; SAH; seizure (last one was )    Subjective: Pt reports he went back to the hospital on Friday because he felt like he had another stroke when he had his last hypertensive emergency; no findings reported and based on EMR review    Objective: See treatment diary below    NM  - Nustep x5 min level 1 for RUE proprioceptive input, endurance, and proximal strength      TE  - 2lb bicep curl to shoulder flexion to work on improving UE strength and endurance; 2x10    TA:  - placing mushroom pegs into pegboard 2 at a time then writing categories based on color peg placed, requiring rest breaks due to fatigue in hand.       Assessment: Pt tolerated session fair today. Low endurance today and lightheadedness impacted session. BP assessed at 118/82 which is very low for Pt. Advised to follow up with nephrology regarding BP management. Pt would benefit from continued skilled occupational therapy services to improve fine motor coordination, dexterity, strength, UE function, and functional cognition.    Plan: Continue per plan of care.       SHORT TERM GOALS ADDED 24:  Pt will increase sustained attention by completing trail making part A in 35 seconds to complete IADLs NOT MET  Pt will increase divided attention by completing trail making part B in 1 minute 30 seconds to complete IADLs NOT MET  Pt will  increase delayed recall and recall 4 /5 items on MOCA to complete IADLs GOAL MET    LONG TERM GOALS ADDED 2/20/24:  Pt will increase sustained attention by completing trail making part A in 38 seconds to complete IADLs  Pt will increase divided attention by completing trail making part B in 1 minute 10 seconds to complete IADLs  Pt will increase delayed recall and recall 5/5 items on MOCA to complete IADLs  Resume right  hand dominance to refined functional assist with all activities of daily living]

## 2025-01-21 NOTE — PROGRESS NOTES
Daily Note     Today's date: 2025  Patient name: Mateus Mckeon  : 1985  MRN: [id number]  Referring provider: Dania Sher DO  Dx:   Encounter Diagnosis     ICD-10-CM    1. Status post fall  Z91.81       2. Cerebrovascular accident (CVA), unspecified mechanism (HCC)  I63.9       3. Other abnormalities of gait and mobility  R26.89                           POC expires Auth Status Total   Visits  Start date  Expiration date PT/OT + Visit Limit? Co-Insurance   2/10/25 Submitted to FD 25, AV pending pending pending BOMN Yes, $0 co-pay                                               Subjective: Patient presents to PT with SPC from OT. OT reporting pt's BP was on the low side for him (118/82 mmHg); pt reports feeling dizzy currently but still wanting to try PT today. Pt reports he was able to go on treadmill at home over the weekend for 20 minutes, holding on with both hands.     Objective: See treatment diary below  *FLUID RESTRICTION- do not offer water*     BP start of session: 118/82 mmHg (per OT reading)  BP end of session: 115/80 mmHg    Per [employer]: >180/110 mmHg at rest, or >250/115 mmHg with activity, need to stop     TA:  - 6MWT, 10 meter walk test, FGA (see below for values)    TE:  - LAQ 2.5# ankle weights, 3x10  - Seated marching 2.5# ankle weights, 3x10    HEP: walking on treadmill at home and exercises below    Access Code: [email or web address]  URL: [email or web address]  Date: 2025  Prepared by: Marge Gilbert PT, DPT     Exercises  - Sit to Stand with Armchair  - 1 x daily - 7 x weekly - 3 sets - 10 reps  - Seated Long Arc Quad  - 1 x daily - 7 x weekly - 3 sets - 10 reps  - Seated March  - 1 x daily - 7 x weekly - 3 sets - 10 reps    Assessment: Patient tolerated treatment well today despite c/o dizziness related to lower BP reading. Able to complete outcome measures from re-eval. He demonstrates regression in 6MWT distance and gait speed, and  non-signifcant decline in FGA still deeming him high fall risk. Spent time reviewing HEP today- plan is for pt to focus on endurance/strengthening at home and focus on dynamic balance/gait during PT sessions. Patient would benefit from continued PT to improve balance, endurance, and reduce fall risk.     Plan: Continue per plan of care.          Outcome Measures Initial Eval  1/30/24 PN/DC  9/4/2024 Initial Evaluation  11/18/24 PN  12/24/2024 Re-eval  1/16/2025 Daily Note  1/21/2025   5xSTS 31.25 sec 13.67 sec   no UE defer 16.33 sec,   no UE 21.03 sec, no UE     TUG 16.3 sec with rollator    23.6 sec no AD 11.48 sec   no AD 18.91 sec w/ rollator     17.16 sec no AD 12.18 sec   no AD 15.72 sec, no AD    10 meter  1.11 m/s self selected pace 0.84 m/s self selected pace  1.05 m/s self selected pace Next time> 0.74 m/s    MEDRANO  49/56 32/56 40/56 40/56    FGA 12/30 15/30 defer 12/30 Next time> 11/30   6MWT 900 ft 955 ft no AD defer 825 ft no AD Next time> 700 ft no AD   mCTSIB  FTEO firm  FTEC firm  FTEO foam  FTEC foam   30 sec  30 sec  30 sec  Unable defer 30 sec  30 sec  30 sec  2 sec 30 sec  30 sec  16 sec  2 sec    ABC  66.88% 62.5% 57.5% 49.38%

## 2025-01-22 ENCOUNTER — CLINICAL SUPPORT (OUTPATIENT)
Age: 40
End: 2025-01-22
Payer: MEDICARE

## 2025-01-22 DIAGNOSIS — Z23 ENCOUNTER FOR IMMUNIZATION: Primary | ICD-10-CM

## 2025-01-22 LAB
ATRIAL RATE: 66 BPM
P AXIS: 77 DEGREES
PR INTERVAL: 184 MS
QRS AXIS: 65 DEGREES
QRSD INTERVAL: 72 MS
QT INTERVAL: 466 MS
QTC INTERVAL: 488 MS
T WAVE AXIS: 35 DEGREES
VENTRICULAR RATE: 66 BPM

## 2025-01-22 PROCEDURE — 93010 ELECTROCARDIOGRAM REPORT: CPT | Performed by: STUDENT IN AN ORGANIZED HEALTH CARE EDUCATION/TRAINING PROGRAM

## 2025-01-22 PROCEDURE — 90471 IMMUNIZATION ADMIN: CPT | Performed by: INTERNAL MEDICINE

## 2025-01-22 PROCEDURE — 90707 MMR VACCINE SC: CPT | Performed by: INTERNAL MEDICINE

## 2025-01-23 ENCOUNTER — OFFICE VISIT (OUTPATIENT)
Facility: CLINIC | Age: 40
End: 2025-01-23
Payer: MEDICARE

## 2025-01-23 ENCOUNTER — OFFICE VISIT (OUTPATIENT)
Dept: NEUROLOGY | Facility: CLINIC | Age: 40
End: 2025-01-23
Payer: MEDICARE

## 2025-01-23 ENCOUNTER — TELEPHONE (OUTPATIENT)
Dept: ADMINISTRATIVE | Facility: OTHER | Age: 40
End: 2025-01-23

## 2025-01-23 VITALS
RESPIRATION RATE: 14 BRPM | WEIGHT: 199.4 LBS | DIASTOLIC BLOOD PRESSURE: 62 MMHG | SYSTOLIC BLOOD PRESSURE: 124 MMHG | HEIGHT: 71 IN | TEMPERATURE: 98.2 F | HEART RATE: 74 BPM | OXYGEN SATURATION: 98 % | BODY MASS INDEX: 27.92 KG/M2

## 2025-01-23 DIAGNOSIS — R47.89 WORD FINDING DIFFICULTY: ICD-10-CM

## 2025-01-23 DIAGNOSIS — N18.6 END STAGE RENAL DISEASE ON DIALYSIS (HCC): ICD-10-CM

## 2025-01-23 DIAGNOSIS — R53.1 GENERALIZED WEAKNESS: ICD-10-CM

## 2025-01-23 DIAGNOSIS — R56.9 PROVOKED SEIZURE (HCC): Primary | ICD-10-CM

## 2025-01-23 DIAGNOSIS — Z99.2 END STAGE RENAL DISEASE ON DIALYSIS (HCC): ICD-10-CM

## 2025-01-23 DIAGNOSIS — Z91.81 STATUS POST FALL: Primary | ICD-10-CM

## 2025-01-23 DIAGNOSIS — I63.9 CEREBROVASCULAR ACCIDENT (CVA), UNSPECIFIED MECHANISM (HCC): ICD-10-CM

## 2025-01-23 DIAGNOSIS — R26.89 OTHER ABNORMALITIES OF GAIT AND MOBILITY: ICD-10-CM

## 2025-01-23 DIAGNOSIS — I63.89 CEREBROVASCULAR ACCIDENT (CVA) DUE TO OTHER MECHANISM (HCC): Primary | ICD-10-CM

## 2025-01-23 DIAGNOSIS — G25.3 MYOCLONIC JERKING: ICD-10-CM

## 2025-01-23 DIAGNOSIS — Z86.73 HISTORY OF CVA (CEREBROVASCULAR ACCIDENT): ICD-10-CM

## 2025-01-23 PROBLEM — E10.21 TYPE 1 DIABETES MELLITUS WITH DIABETIC NEPHROPATHY, WITH LONG-TERM CURRENT USE OF INSULIN (HCC): Status: ACTIVE | Noted: 2017-06-19

## 2025-01-23 PROCEDURE — G2212 PROLONG OUTPT/OFFICE VIS: HCPCS | Performed by: PSYCHIATRY & NEUROLOGY

## 2025-01-23 PROCEDURE — 99215 OFFICE O/P EST HI 40 MIN: CPT | Performed by: PSYCHIATRY & NEUROLOGY

## 2025-01-23 PROCEDURE — 97112 NEUROMUSCULAR REEDUCATION: CPT

## 2025-01-23 PROCEDURE — 97112 NEUROMUSCULAR REEDUCATION: CPT | Performed by: PHYSICAL THERAPIST

## 2025-01-23 PROCEDURE — 97530 THERAPEUTIC ACTIVITIES: CPT

## 2025-01-23 NOTE — PROGRESS NOTES
Neurology Ambulatory Visit  Name: Mateus Mckeon       : 1985       MRN: [id number]   Encounter Provider: Tena Graves MD   Encounter Date: 2025  Encounter department: [facility] [place]  Referring Provider/PCP: Dania Sher DO     Assessment & Plan  Provoked seizure (HCC)  History of provoked seizures in the setting of hypertensive emergency  He had one seizure like event during his hospitalization in November with myoclonic jerking and an abnormal EEG study showing the presence of a single right temporal sharp wave in the setting of hypertensive emergency.  No clear independent predisposition to recurrent seizures.  Word finding difficulty could be a more chronic cognitive problem due to repeated cerebrovascular injury from prior strokes and episodes of hypertensive emergency.  - ambulatory EEG 24 hours  - if there are events that look like seizures, try to get a video recording  Myoclonic jerking    History of CVA (cerebrovascular accident)  Follow-up with stroke specialist  Word finding difficulty  Neuropsychology referral for evaluation of word finding difficulties to assess for mild cognitive impairments due to prior cerebrovascular disease       He will Return in about 6 months (around 2025) for SOVS; and a routine follow-up with Dr. Penny regarding stroke prevention.      Assessment:  Mr. Mateus Mckeon is a 39 y.o. man with prior multiple strokes, intractable medical refractory hypertension, and chronic kidney disease who has recurrent hypertensive emergencies and encephalopathy.  In , he had provoked seizures in the setting of hypertensive and uremic encephalopathy.  More recently in , he had another episode of hypertensive encephalopathy with some myoclonic jerking activity, confusion and word finding difficulty.  He had a routine EEG study that showed the presence of a single right posterior temporal sharp wave.  The overall  clinical feature of his encephalopathy was more likely attributed to his hypertensive emergency than an underlying predisposition to recurrent seizures (I.e. seizure disorder).  The presence of a single sharp wave on the EEG is a finding of uncertain clinical significance without an unprovoked seizure.  It is not clear as to what had happened on the night of 11/5/2024, there was mention of a seizure but no further detail is available.  We should not make a diagnosis of seizure disorder without certainty of an unprovoked seizure.  For the time being, I do not recommend starting an antiseizure medication based on the EEG finding.  We could repeat a 24 hours ambulatory EEG study to assess for the consistency in the presence of interictal epileptiform discharges.    With his prior strokes and chronic kidney disease, it is possible for him to develop neurocognitive impairments.  So far his word finding difficulty is likely a chronic problem rather than an episodic problem.  Formal neuropsychological profile maybe more sensitive in determining his cognitive deficits.    Plan:   History of provoked seizures in the setting of hypertensive emergency  He had one seizure like event during his hospitalization in November with myoclonic jerking and an abnormal EEG study showing the presence of a single right temporal sharp wave in the setting of hypertensive emergency.  No clear independent predisposition to recurrent seizures.  Word finding difficulty could be a more chronic cognitive problem due to repeated cerebrovascular injury from prior strokes and episodes of hypertensive emergency.  - ambulatory EEG 24 hours  - if there are events that look like seizures, try to get a video recording    Neuropsychology referral for evaluation of word finding difficulties to assess for mild cognitive impairments due to prior cerebrovascular disease    Follow-up in 6 months  Schedule a follow-up visit with Dr. Penny to management risk  factors for stroke (secondary stroke prevention)    Call the office if there are new events, episodes of altered awareness, convulsion, or seizure like activity.  If you were to have a convulsive seizure, call [email or web address]/[email or web address] and go to the nearest hospital for evaluation.    I discussed the warning signs of stroke with the patient.  I reviewed that stroke and transient ischemic attack are signs of acute neurologic impairment due to abnormal cerebrovascular pathology either from ischemia (lack of blood flow) or hemorrhage (excessive bleeding).  Warning signs include but are not limited to acute (immediate onset) speech impairment, inability to understand language, loss of vision, visual deficits, lateralizing weakness, facial weakness (facial droop), sensory loss, ataxia, gait imbalance, sensation of being uncoordinated, or changes in perception/sensorium.    Patient was instructed to call 911/EMS for immediate attention for stroke alert to be started.  It is important to know the time symptoms started or when the patient was last known well.      I reviewed secondary stroke risk modification includes the use of antiplatelet agent (aspirin or clopidogrel or dipyridamole), antihypertensive medications to keep BP<130/90, lowering cholesterol by diet or with HMG CoA reductase inhibitor (statins), and diabetes management (if the patient has diabetes, goal HgbA1c <7.0).  Other factors may include cessation of smoking, further evaluation for cardiac arrhythmias (atrial fibrillation) or cerebrovascular stenosis, and obstructive sleep apnea.     Continue to follow-up with your medical doctors to continue to manage your epilepsy and end-stage renal disease.  You are at risk for provoke seizures should your blood pressure become too excessive (> 180/100) or if your potassium, sodium, or other electrolytes are severely out of balance.      Chief Complaint:   Chief Complaint   Patient presents with    Hospital Follow-up    Seizures      "Re-evaluation for seizure that happened in November 2024      HPI:      Mateus Mckeon is a 39 y.o. right handed male here for follow-up evaluation of a more recent seizure in November 2024.    Intake History 1/23/2025  I last saw Mr. Mckeon on 10/30/2020.  Since then he had seen Dr. Penny on 4/3/2024 -   He has a history of cerebellar and pontine strokes, prothrombin gene mutation, MTHFR mutation, ESRD on HD.  On 1/5/2024 he presented to hospital with headache and found to have SAH.  He had a cerebral angiogram.  MRI brain study showed punctate strokes in the right MCA territory, left insular strokes, and right velia strokes.  He was found to have right arm weakness after a second angiogram (strokes were thought to be a complication from conventional angiogram).  He is on aspirin 81mg daily.  He was seen by Dr. Mittal regarding the SAH, no aneurysm was found, so the diagnosis was \"benign perimesencephalic hemorrhage\" due to no vascular etiology for SAH.    November 2024 - he was seen by neurology consultation - he presented to hospital as a stroke alert with dizziness and word finding difficulties.  At 8:30 AM he began to talk with word finding difficulties (he has chronic sensory deficits and weakness in the right arm from prior strokes, and a right lower quadrant visual field deficit).  His blood pressure was found to be elevated to 215/100.  CTA head/neck was unremarkable.  He was started on Aspirin and Plavix.  On 11/6/2024, during dialysis - he had a change in mental status, diaphoretic, lasted about 30 minutes.  During the night, ICU night shift reported that he had tonic-clonic movements and word finding difficulties.  An EEG study was obtained that showed a single right occipital-posterior temporal spike wave discharge and diffuse theta-delta activity.  Discharge summary from 11/5/2024 admission - hypertensive emergency with blood pressure up to 200s, word finding difficulties, MRI brain study was " "negative for acute stroke.  EEG study showed one spike discharge, no antiseizure medication was recommended at the time, recommended a follow-up visit for evaluation.    He is here with his mother, who recalled that in the November 2024 admission, she saw that he was having twitching activity (it was not a lot maybe 3 times, mostly involved the arms) when he was awake.  She was not told if he had a convulsive seizure.  His mother recalled that for November he had severely elevated blood pressure, he could not find words, start a sentence then he can't find the word, but he was oriented, aware of what was happening, and no aphasia.    He has not had recurrence of the twitching or muscle jerking activity.  He has a couple more admissions to hospital for hypertensive emergency, but he also felt that he was having word finding problems, but it was not as severe as the prior strokes.  He may have noticed that the last few months that he has more difficulty with finding words or trying to explain things (such as during dialysis and physical therapy).  He will start a sentence and find that he cannot say what he wants to say.  He denies having problems with producing speech or understanding people.  Word finding difficulty has been more daily.  His mother has not noticed pauses in his speech or word substitution or neologisms.  His mother has not noticed if he has been more forgetful, but she may have to help him find things because he has low energy to find things on his own.    There has been no moment of him staring off, unresponsive, unusual repetitive behavior, or periods of confusion.   He continues to have weakness of the right arm from his prior strokes.  After his second stroke, he had strabismus surgery, difficult with looking to the right (he has difficulty focusing due to eye movements)      AED/side effects/compliance:  None    Event/Seizure semiology:  \"seizing\" in the setting of hypertensive encephalopathy - " 2/21/2020    Prior Epilepsy History:  Intake History 3/9/2020  Initially, the patient went to the ED in [place] on 2/20/2020 for two days of dizziness and word finding difficulty (seemingly not speaking and inattentive).  When neurology was assessing the patient on 2/21/2020, he was seizing (see note by Dr. Pankaj Ruffin) and a rapid response was called for status epilepticus.  Patient was subsequently given lorazepam 10mg along with midazolam 4mg, intubated, and loaded with 2 g of valproic acid.  He was then transferred to [facility] for video EEG monitoring.  There was no seizure on video EEG monitoring.  The neurology consultant considered that a few right arm jerks and eye deviation was concerning for seizure (or in follow-up neurology note: he was poorly responsive, alternating deviation of gaze and muscle rigidity).  At the end, neurology thought that the seizure could have been due to hypertensive encephalopathy (when seen by Dr. Ruffin -264/; 2/21/2020 his BP was 198/110).  He was discharged on divalproex.  MRI brain study did not find any new pathology.  Video EEG study did not capture further seizures.  He was previously followed by Dr. Bryan Justice due to multiple white matter T2 hyperintensities along with an acute stroke in January 2018 that affected his abducens nucleus causing diplopia (included CSF work up to rule out MS).  He was seen by Dr. ANALIA Elizondo for the extensive white matter changes on MRI brain imaging study; she did not believe that he had an underlying demyelinating syndrome.    He has 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 and heterozygous for prothrombin [id number].  (he was seen by Hematology at [facility] on 3/28/2019, the significance of MTHFR mutation is unclear, heterozygous prothrombin gene is more concerning, no family history of VTE and has a greater risk.  There is a nephrology consultation follow-up note on 2/22/2020 that referred to the patient getting Epogen 13208 units  weekly, and it was on hold when he had seizures.  He was on Epogen for about a year and a half ago, it is given if his hemoglobin is less 10.  He gets Epogen at his dialysis.  Last blood work was 2/7/2020.  He saw Dr. Welsh ([facility]) on 1/17/2020 and goes to [facility] to evaluate his peritoneal dialysis and that is where he gets Epogen injection.  It seems that he gets Epogen injection twice a month and last one was on 2/7/2020.    Patient's history:  He is here with his parents.  He was confused on the day he presented to the hospital.  He was having a difficult time remembering facts.  He was not feeling well for 2 days, he was lethargic, he seems like he was staring for a few seconds, not being able to say what he wanted to say (not able to get the right words out, could not describe how he was feeling).  There was no twitching or jerking activity.  There is no prior history of convulsive seizure or childhood seizures.  He never had difficulty with finding words in the past.    He continues to have difficulty recalling what happened during the hospitalization.  His blood pressure in the morning could be 180/100.  He usually tries to aim for -160.  His nephrologist at his dialysis center manages his blood pressure.  If his systolilc blood pressure goes below 120, he gets dizzy (motion feeling or lightheadedness).    There has been no recurrent episode of word finding difficulty.  During dialysis (he does his own night time peritoneal dialysis), he notices that his hands have tremors or twitching that makes it difficult for him to control his fine motor skill, including difficulty controlling his fingers for utensils and writing.  He has difficulty controlling his hands to  pills.    The patient denies any history of myoclonus, staring spells, automatisms, unexplained hyperkinetic behaviors, olfactory / gustatory hallucinations, epigastric rising events, rodolfo vu events, visual hallucinations,  unexplained nocturnal enuresis, or nocturnal tongue biting.    Summary   Divalproex was weaned off.  He reports that his dexterity improved after stopping divalproex.  There have been no recurrent seizure, loss of consciousness, jerking activity, period of unresponsive, or aphasia.        Special Features  Status epilepticus: No  Self Injury Seizures: No  Precipitating Factors: None    Epilepsy Risk Factors:  Abnormal pregnancy: No  Abnormal birth/: No  Abnormal Development: No  Febrile seizures, simple: No  Febrile seizures, complex: No  CNS infection: No  Mental retardation: No  Cerebral palsy: No  Head injury (moderate/severe): No  CNS neoplasm: No  CNS malformation: No  Neurosurgical procedure: No  Stroke: first stroke - loss of balance/walking; second stroke - caused double vision, he needed strabismus surgery to help (MRI brain 2018 - left facial droop, diffusion restriction in the posterior aspect of the velia near the facial colliculus (involving the abducens nucleus)); multifocal strokes in 2024  Alcohol abuse: No  Drug abuse: No  Family history Sz/epilepsy: No    Prior AEDs:  medication Max dose Time used Reason to stop                 Prior workup:  x  Imagin2018  MRI brain  Small focus of subacute infarct along the posterior aspect of the velia near the facial colliculus (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    2020  MRI brain w/o  Chronic lacunar infarcts in the left centrum semiovale and left thalamus  Previously demonstrated punctate infarct in the posterior velia is no longer visualized  Periventricular/subcortical T2 hyperintensities  Stable enlargement of the ventricles and sulci, consistent with  volume loss     1/14/2024 - MRI brain  Crescendo in multifocal areas of diffusion restriction in multiple vascular territories (new from prior study)  Progressive acute infarction  Diffusion restriction in the left occipital lobe indicative of an interval posterior cerebral artery infarction  Right occipital lobe cortex, subtle cortical focus of diffusion restriction  Right frontal cluster, right frontal operculum, left insular-subinsular of DWI lesions  Scattered areas of hemosiderin in the right frontal convexity, left frontotemporal regions, left frontoparietal region.    2/13/2024 - MRI brain   Multifocal gliosis and encephalomalacia with bandlike distribution in the supratentorial brain  Left cerebellum, left occipital lobe gliosis and encephalomalacia  Chronic bilateral basal ganglia lacunar infarctions  Evolving diffusion restriction in the left centrum semiovale  Superficial siderosis in the left frontoparietal region  Right frontal lobe hemosiderin staining    11/5/2024 - CTA head/neck  No large vessel occlusion or stenosis    11/6/2024 - MRI brain  Chronic ischemia in the left corona radiata  Small scattered hyperintensities on T2/FLAIR imaging in the periventricular and subcortical white matter  Hemosiderosis is unchanged.  No acute infarct is present.    EEGs:  2/21-2/23/2020  LTM - initially the study was started when he was intubated.  There was 9-10 Hz alpha activity with diffuse polymorphic delta activity, overtime the background improved with alpha, theta, and beta activity.  There were no epileptiform discharges  Events nonepileptic - appearing to be stretching and intermittent word finding difficulty (difficulty with speech)    3/18/2020  Excessive theta activity in the posterior rhythm.    9/14/2020  Poorly organized background with delta/theta activity  No epileptiform discharges.    11/7/2024  Diffuse theta-delta activity  Single right posterior temporal sharp wave (maximal over T6, broad  "field)    Labs:  Component      Latest Ref Rng 1/12/2025 1/17/2025   WBC      4.31 - 10.16 Thousand/uL  4.95    RBC      3.88 - 5.62 Million/uL  3.68 (L)    Hemoglobin      12.0 - 17.0 g/dL  12.0    Hematocrit      36.5 - 49.3 %  36.7    Platelet Count      149 - 390 Thousands/uL  222    Sodium      135 - 147 mmol/L  137    Potassium      3.5 - 5.3 mmol/L  5.1    Chloride      96 - 108 mmol/L  95 (L)    Carbon Dioxide      21 - 32 mmol/L  30    ANION GAP      4 - 13 mmol/L  12    BUN      5 - 25 mg/dL  37 (H)    Creatinine      0.60 - 1.30 mg/dL  9.42 (H)    GLUCOSE      65 - 140 mg/dL  153 (H)    Calcium      8.4 - 10.2 mg/dL  9.0    AST      13 - 39 U/L  15    ALT      7 - 52 U/L  8    ALK PHOS      34 - 104 U/L  75    Total Protein      6.4 - 8.4 g/dL  6.4    Albumin      3.5 - 5.0 g/dL  4.1    Total Bilirubin      0.20 - 1.00 mg/dL  0.57    GFR, Calculated      ml/min/1.73sq m  6    Vitamin B-12      180 - 914 pg/mL 500     FOLATE      >5.9 ng/mL 16.4     MAGNESIUM      1.9 - 2.7 mg/dL  2.7         General exam   /62 (BP Location: Right arm, Patient Position: Sitting, Cuff Size: Standard)   Pulse 74   Temp 98.2 °F (36.8 °C) (Temporal)   Resp 14   Ht 5' 11\" (1.803 m)   Wt 90.4 kg (199 lb 6.4 oz)   SpO2 98%   BMI 27.81 kg/m²    Appearance: normocephalic, normally developed  Carotids: not assessed  Cardiovascular: regular rate and rhythm and soft 2/6 systolic murmur is present  Pulmonary: clear to auscultation  Extremities: no edema    HEENT: anicteric and moist mucus membranes / oral cavity   Fundoscopy: not assessed    Mental status  Orientation: alert and oriented to name, place, time  Fund of Knowledge: intact   Attention and Concentration: able to spell HOUSE forwards and backwards  Current and Remote Memory:semantic and episodic memories are intact  Language: spontaneous speech is normal and comprehension is intact  MOCA 28/30 - missed that date and one word on recall    Cranial Nerves  CN 1: not " tested  CN 2:  there is a slight right upper field deficit (more lateral with the right eye) and pupils equal round reactive to direct and consenual light   CN 3, 4, 6: EOMI, no nystagmus  CN 5: there is decreased sensation to lighttouch over the right V1, V2, and V3 regions  CN 7: smile is symmetric, but there is decreased right nasolabial fold  CN 8:symmetric to finger rubs bilaterally  CN 9, 10:no dysarthria present  CN 11:symmetric strength of sternocleidomastoid and trapezius muscles and symmetric SCM with head turns  CN 12:tongue is midline    Motor:  Bulk, Tone: normal bulk, normal tone  Pronation:  subtle pronation of the right arm and mildly orbits around the right arm  Strength: Patient has full strength symmetrically of shoulder abduction, biceps, triceps, wrist flexion, wrist extension, finger flexion, finger abduction, hip flexion, knee flexion, knee extension, dorsiflexion, plantar flexion;    There is no abnormal movements of his hands    Sensory:  Lighttouch: intact at the upper arms, forearms, hands, and knees  Pinprick - intact at the arms, fingers, legs, decreased at the ankles  Romberg: normal    Coordination:  FNF:intact left hand/fingers and slightly wavers when the right finger approaches the examiner's fingers  VERA:clumsy with the right hand / arm, incoordinated finger movements of the right, and slow finger taps on the right  FFM:incoordinated finger movements of the right and intact left hand/fingers  Gait/Station:wide base gait and unable to tandem    Reflexes:  2+/4 Bilateral biceps, brachioradialis, triceps; 0/4 knees and ankles    Past Medical/Surgical History:  Patient Active Problem List   Diagnosis    Hypertensive urgency    Type 1 diabetes mellitus (HCC)    History of CVA (cerebrovascular accident)    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,      Binocular visual disturbance    Hyperphosphatemia    Vitamin D deficiency    Homozygous MTHFR mutation C677T    Anemia in  chronic kidney disease, on chronic dialysis (HCC)    Persistent proteinuria    Ataxia    Left atrial dilation    Renovascular hypertension    Heterozygous for prothrombin [id number] mutation (HCC)    Dyslipidemia    Strabismus    Environmental and seasonal allergies    Pruritus    Gastroparesis diabeticorum  (HCC)    Multiple pulmonary nodules    Vertigo    Impaired mobility and ADLs    Diabetic neuropathy (HCC)    Provoked seizure (HCC)    Asterixis    Foot drop, bilateral    Secondary hyperparathyroidism (HCC)    ESRD on hemodialysis (HCC)    Tubulovillous adenoma of colon    Gastroesophageal reflux disease without esophagitis    Medical cannabis use    Generalized anxiety disorder    Hypothyroidism    Coronary artery disease involving native coronary artery of native heart without angina pectoris    Status post placement of implantable loop recorder    RACHEAL (obstructive sleep apnea)    Type 1 diabetes (HCC)    Hyperlipidemia    Insomnia    Subarachnoid hemorrhage (HCC)    Cerebellar stroke syndrome    Anemia of chronic disease    ESRD (end stage renal disease) (HCC)    Cerebrovascular accident (CVA) of left pontine structure (HCC)    Arm paresthesia, right    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)    Moderate episode of recurrent major depressive disorder (HCC)    Primary hypertension    Secondary hyperparathyroidism of renal origin (HCC)    Word finding difficulty    Chronic kidney disease-mineral and bone disorder    Myoclonic jerking    Thromboembolic disorder (HCC)    Paroxysmal atrial fibrillation (HCC)    Influenza    Benign hypertension with ESRD (end-stage renal disease) (HCC)    Hyperkalemia    Type 1 diabetes mellitus with diabetic nephropathy, with long-term current use of insulin (HCC)     Past Medical History:   Diagnosis Date    Acute kidney injury (HCC)     Ambulates with cane     Anuria     Anxiety     Cellulitis of right elbow 03/31/2021    Chronic kidney disease     COVID-19  10/18/2024    Depression     Diabetes mellitus (HCC)     Diarrhea     Emesis 10/24/2020    End stage renal disease (HCC) 02/11/2018    Formatting of this note might be different from the original. Last Assessment & Plan:  Secondary to DM.  On nightly PD.  Followed by Nephro.  Patient considering transplant for kidney and pancreas through [facility] Formatting of this note might be different from the original. Last Assessment & Plan:  Formatting of this note might be different from the original. 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    Esophagitis 07/21/2015    Falls     Gastroparesis     GERD (gastroesophageal reflux disease)     History of shingles 2010    History of transfusion 02/2018    no adverse reaction    Hyperlipidemia     Hyperphosphatemia     Hypertension     Hypoglycemia 07/15/2022    Itching     Mastoiditis of right side 07/15/2022    Muscle weakness     general unsteadiness    Obesity (BMI 30.0-34.9) 09/09/2019    Orthostatic hypotension 10/25/2020    Peripheral polyneuropathy 11/20/2019    PONV (postoperative nausea and vomiting) 01/26/2018    Protein-calorie malnutrition (HCC) 11/23/2020    Recurrent peritonitis (HCC) due to peritoneal dialysis catheter 07/31/2020    Retinopathy     Seizures (HCC)     early 2020 - one time    Skin abnormality     some dime size areas where skin was scratched from itching    Sleep apnea     Spontaneous bacterial peritonitis (HCC) 10/19/2020    Squamous cell skin cancer     left temple    Stroke (HCC)     x2 - off balance/no driving/fatigue    Swelling of both lower extremities     Swelling of joint of upper arm, right 04/03/2024    Traumatic onycholysis 07/21/2022    Vomiting     Wears glasses     Word finding difficulty 11/05/2024     Past Surgical History:   Procedure Laterality Date    CARDIAC ELECTROPHYSIOLOGY PROCEDURE N/A 9/21/2023    Procedure: Cardiac loop recorder explant;  Surgeon: Parish Morgan MD;  Location:   CARDIAC CATH LAB;  Service: Cardiology    CARDIAC LOOP RECORDER  05/2018    COLONOSCOPY      EGD      EYE SURGERY Right     HEMODIALYSIS ADULT  11/6/2024    IR AV FISTULAGRAM/GRAFTOGRAM  02/23/2021    IR CEREBRAL ANGIOGRAPHY  1/12/2024    IR CEREBRAL ANGIOGRAPHY / INTERVENTION  1/5/2024    IR TUNNELED CENTRAL LINE PLACEMENT  02/16/2021    IR TUNNELED DIALYSIS CATHETER PLACEMENT  11/18/2020    IR TUNNELED DIALYSIS CATHETER REMOVAL  02/12/2021    IR TUNNELED DIALYSIS CATHETER REMOVAL  03/11/2021    MOHS SURGERY Left 12/14/2022    Left temple with Dr. Hassan    PERITONEAL CATHETER INSERTION N/A 08/27/2018    Procedure: UNROOF PD CATHETER;  Surgeon: Felipe Lindo DO;  [facility]: [facility];  Service: General    WV ARTERIOVENOUS ANASTOMOSIS OPEN DIRECT Left 11/09/2020    Procedure: CREATION FISTULA  ARTERIOVENOUS (AV) - LEFT WRIST;  Surgeon: Placido Altamirano MD;  Location: [facility];  Service: Vascular    WV ESOPHAGOGASTRODUODENOSCOPY TRANSORAL DIAGNOSTIC N/A 04/18/2019    Procedure: ESOPHAGOGASTRODUODENOSCOPY (EGD);  Surgeon: Ale Figueroa MD;  Location: [facility];  Service: Gastroenterology    WV LAPS INSERTION TUNNELED INTRAPERITONEAL CATHETER N/A 08/06/2018    Procedure: LAPAROSCOPIC PD CATHETER PLACEMENT;  Surgeon: Felipe Lindo DO;  [facility];  Service: General    WV REMOVAL TUNNELED INTRAPERITONEAL CATHETER N/A 11/18/2020    Procedure: REMOVAL CATHETER PERITONEAL DIALYSIS;  Surgeon: Abdifatah Ty MD;  [facility];  Service: General    TONSILLECTOMY      UPPER GASTROINTESTINAL ENDOSCOPY         Past Psychiatric History:  Depression: No  Anxiety: No  Psychosis: No    Medications:    Current Outpatient Medications:     aspirin (ECOTRIN LOW STRENGTH) 81 mg EC tablet, Take 81 mg by mouth daily, Disp: , Rfl:     atorvastatin (LIPITOR) 40 mg tablet, TAKE 1 TABLET BY MOUTH ONCE DAILY IN THE EVENING, Disp: 90 tablet, Rfl: 1    b complex vitamins capsule, Take 1 capsule by mouth daily before lunch ,  Disp: , Rfl:     calcium acetate (PHOSLO) capsule, Take 1 capsule (667 mg total) by mouth 3 (three) times a day, Disp: 90 capsule, Rfl: 0    cinacalcet (SENSIPAR) 60 MG tablet, Take 1 tablet (60 mg total) by mouth daily, Disp: 30 tablet, Rfl: 0    cloNIDine (CATAPRES) 0.2 mg tablet, Take 1 tablet (0.2 mg total) by mouth every 8 (eight) hours, Disp: 90 tablet, Rfl: 0    doxazosin (CARDURA) 4 mg tablet, Take 1 tablet (4 mg total) by mouth daily, Disp: 30 tablet, Rfl: 0    famotidine (PEPCID) 10 mg tablet, Take 1 tablet (10 mg total) by mouth daily (Patient taking differently: Take 40 mg by mouth daily), Disp: 30 tablet, Rfl: 2    gabapentin (NEURONTIN) 100 mg capsule, TAKE 1 CAPSULE BY MOUTH IN THE MORNING AND 2 AT BEDTIME, Disp: 90 capsule, Rfl: 5    GLUCAGON EMERGENCY 1 MG injection, , Disp: , Rfl: 3    Gvoke HypoPen 1-Pack 1 MG/0.2ML SOAJ, as needed, Disp: , Rfl:     hydrALAZINE (APRESOLINE) 100 MG tablet, Take 1 tablet (100 mg total) by mouth every 8 (eight) hours, Disp: 90 tablet, Rfl: 2    hydrOXYzine HCL (ATARAX) 10 mg tablet, Take 1 tablet (10 mg total) by mouth every 6 (six) hours as needed for itching or anxiety, Disp: 30 tablet, Rfl: 3    hyoscyamine (ANASPAZ,LEVSIN) 0.125 MG tablet, Take 1 tablet (0.125 mg total) by mouth every 4 (four) hours as needed for cramping, Disp: 30 tablet, Rfl: 0    Insulin Disposable Pump (Omnipod 5 G6 Intro, Gen 5,) KIT, , Disp: , Rfl:     Insulin Disposable Pump (Omnipod 5 G6 Pod, Gen 5,) MISC, , Disp: , Rfl:     labetalol (NORMODYNE) 200 mg tablet, Take 2 tablets (400 mg total) by mouth 3 (three) times a day, Disp: 180 tablet, Rfl: 2    losartan (COZAAR) 100 MG tablet, Take 1 tablet (100 mg total) by mouth daily at bedtime, Disp: 30 tablet, Rfl: 2    NIFEdipine (PROCARDIA XL) 90 mg 24 hr tablet, Take 1 tablet (90 mg total) by mouth 2 (two) times a day, Disp: 60 tablet, Rfl: 0    NovoLOG 100 UNIT/ML injection, , Disp: , Rfl:     ondansetron (ZOFRAN) 4 mg tablet, Take 1 tablet  (4 mg total) by mouth every 8 (eight) hours as needed for nausea or vomiting, Disp: 90 tablet, Rfl: 1    Patiromer Sorbitex Calcium (VELTASSA PO), Take 5 g by mouth once a week, Disp: , Rfl:     SPS 15 GM/60ML suspension, TAKE 60 ML BY MOUTH SINGLE DOSE FOR EMERGENCY USE DURING MISSED HEMODIALYSIS, Disp: , Rfl:     torsemide (DEMADEX) 100 mg tablet, Take 1 tablet (100 mg total) by mouth daily Do not start before January 8, 2025., Disp: 30 tablet, Rfl: 2    escitalopram (LEXAPRO) 20 mg tablet, Take 1 tablet (20 mg total) by mouth daily, Disp: 90 tablet, Rfl: 1    traZODone (DESYREL) 50 mg tablet, Take 1 tablet (50 mg total) by mouth daily at bedtime as needed for sleep, Disp: 30 tablet, Rfl: 1    Allergies:  Allergies   Allergen Reactions    Sulfa Antibiotics Rash       Family history:  Family History   Problem Relation Age of Onset    Breast cancer Mother     Hypertension Mother     Hyperlipidemia Father     Hypertension Father     Leukemia Maternal Grandmother     Hyperlipidemia Maternal Grandfather     Hypertension Maternal Grandfather     Hyperlipidemia Paternal Grandmother     Hypertension Paternal Grandmother     Heart disease Paternal Grandfather         cardiac disorder    Diabetes Paternal Grandfather        Social History  Living situation:  Lives with parents  Work:  Completed some college, disabled from medical condition  Driving:  Not driving, but he has an active 's license   reports that he quit smoking about 7 years ago. His smoking use included cigarettes. He started smoking about 19 years ago. He has a 6 pack-year smoking history. He has never used smokeless tobacco. He reports that he does not currently use alcohol. He reports current drug use. Drug: Marijuana.      The total amount of time spent with the patient along with pre-chart and post-chart preparation was 66 minutes on the calendar day of the date of service.  This included history taking, physical exam, review of ancillary testing,  counseling provided to the patient regarding diagnosis, medications, treatment, and risk management, and other communication to the patient's providers and/or family.  Start time: 11:10AM  End time: 12:16PM

## 2025-01-23 NOTE — TELEPHONE ENCOUNTER
----- Message from Dania Sher DO sent at 1/23/2025  8:52 AM EST -----  Regarding: HM DM eye exam  Please result HM DM eye exam in patient's care gap.  It was scanned 12/31/24 under Media from [facility].    Thank you.    Dr. Sher

## 2025-01-23 NOTE — PROGRESS NOTES
"OT Daily Note     Today's date: 2025  Patient name: Mateus Mckeon  : 1985  MRN: [id number]  Referring provider: Dania Sher DO  Dx:   Encounter Diagnosis     ICD-10-CM    1. Cerebrovascular accident (CVA) due to other mechanism (HCC)  I63.89       2. Generalized weakness  R53.1       3. End stage renal disease on dialysis (HCC)  N18.6     Z99.2                     Start Time: 0800  Stop Time: 0845  Total time in clinic (min): 45 minutes    PLAN OF CARE START: 2025  PLAN OF CARE END: 2025  FREQUENCY: 2x/wk  PRECAUTIONS: Renal failure, actively going through dialysis; type 1 diabetes; HTN; SAH; seizure (last one was )    Subjective: Pt reports he feels \"sluggish\" today so he decided to use his rollator    Objective: See treatment diary below    NM  - Nustep x5 min level 1 for RUE proprioceptive input, endurance, and proximal strength  - Standing push ups with shoulder taps for RUE proprioceptive, proximal strengthening, and endurance - reps until failure - x4, x6, x6    TA:  - finger<>palm translation with stabilization of 2 marbles at a time to place on number/letter powerwebs while naming foods for each to work on FMC, manipulation, recall, working memory, and attention      Assessment: Pt tolerated session well today. Poor endurance continues to impact tolerance to activities and required rest breaks throughout. No drops of marbles during in hand manipulation with the R today. Pt would benefit from continued skilled occupational therapy services to improve fine motor coordination, dexterity, strength, UE function, and functional cognition.    Plan: Continue per plan of care.       SHORT TERM GOALS ADDED 24:  Pt will increase sustained attention by completing trail making part A in 35 seconds to complete IADLs NOT MET  Pt will increase divided attention by completing trail making part B in 1 minute 30 seconds to complete IADLs NOT MET  Pt will increase delayed recall and " recall 4 /5 items on MOCA to complete IADLs GOAL MET    LONG TERM GOALS ADDED 2/20/24:  Pt will increase sustained attention by completing trail making part A in 38 seconds to complete IADLs  Pt will increase divided attention by completing trail making part B in 1 minute 10 seconds to complete IADLs  Pt will increase delayed recall and recall 5/5 items on MOCA to complete IADLs  Resume right  hand dominance to refined functional assist with all activities of daily living]

## 2025-01-23 NOTE — PATIENT INSTRUCTIONS
History of provoked seizures in the setting of hypertensive emergency  He had one seizure like event during his hospitalization in November with myoclonic jerking and an abnormal EEG study showing the presence of a single right temporal sharp wave in the setting of hypertensive emergency.  No clear independent predisposition to recurrent seizures.  Word finding difficulty could be a more chronic cognitive problem due to repeated cerebrovascular injury from prior strokes and episodes of hypertensive emergency.  - ambulatory EEG 24 hours  - if there are events that look like seizures, try to get a video recording    Neuropsychology referral for evaluation of word finding difficulties to assess for mild cognitive impairments due to prior cerebrovascular disease    Follow-up in 6 months with Dr. Graves  Schedule a follow-up visit with Dr. Penny to management risk factors for stroke (secondary stroke prevention)    Call the office if there are new events, episodes of altered awareness, convulsion, or seizure like activity.  If you were to have a convulsive seizure, call 911/EMS and go to the nearest hospital for evaluation.    I discussed the warning signs of stroke with the patient.  I reviewed that stroke and transient ischemic attack are signs of acute neurologic impairment due to abnormal cerebrovascular pathology either from ischemia (lack of blood flow) or hemorrhage (excessive bleeding).  Warning signs include but are not limited to acute (immediate onset) speech impairment, inability to understand language, loss of vision, visual deficits, lateralizing weakness, facial weakness (facial droop), sensory loss, ataxia, gait imbalance, sensation of being uncoordinated, or changes in perception/sensorium.    Patient was instructed to call 911/EMS for immediate attention for stroke alert to be started.  It is important to know the time symptoms started or when the patient was last known well.      I reviewed secondary  "stroke risk modification includes the use of antiplatelet agent (aspirin or clopidogrel or dipyridamole), antihypertensive medications to keep BP<130/90, lowering cholesterol by diet or with HMG CoA reductase inhibitor (statins), and diabetes management (if the patient has diabetes, goal HgbA1c <7.0).  Other factors may include cessation of smoking, further evaluation for cardiac arrhythmias (atrial fibrillation) or cerebrovascular stenosis, and obstructive sleep apnea.     Continue to follow-up with your medical doctors to continue to manage your epilepsy and end-stage renal disease.  You are at risk for provoke seizures should your blood pressure become too excessive (> 180/100) or if your potassium, sodium, or other electrolytes are severely out of balance.    Patient Education     Lowering the risk of having a stroke   The Basics   Written by the doctors and editors at UpToDate   Who is at risk for having a stroke? -- Certain things affect a person's risk of stroke. These are called \"risk factors.\"  Some of these things are not under your control. For example, you are at increased risk if you:   Are older   Are Black   Have a parent or sibling who has had a stroke   Have had a stroke or heart attack before  Certain health problems and behaviors also increase your risk of stroke. These include:   High cholesterol   High blood pressure   Diabetes   Smoking   Sedentary lifestyle   Drinking a lot of alcohol   Misusing drugs  If you have risk factors for stroke, it's important to know this so you can try to lower your risk. Ways to do this are discussed more below.  How can I lower my risk of stroke? -- Anyone can make healthy lifestyle changes to help lower their risk of stroke.  For people who have had a stroke before, doctors also recommend medicines. If your doctor prescribes medicines, it's still important to make healthy lifestyle changes, too.  Lifestyle changes -- Lifestyle changes can do a lot to lower your " "risk of stroke. That's partly because the right lifestyle choices can help control risk factors such as blood pressure, diabetes, and cholesterol. Also, the lifestyle changes that help lower your risk of stroke can also help prevent lots of other health problems.  The most important lifestyle changes:   Stop smoking, if you smoke.   Get regular exercise (if your doctor says that it's safe) for at least 30 minutes a day on most days of the week.   Lose weight, if you are overweight. Your doctor or nurse can help you do this in a healthy way.   Eat a healthy diet with lots of fruits and vegetables. Some experts recommend a \"Mediterranean diet.\" This involves eating a lot of fruits, vegetables, nuts, whole grains, and olive oil. It also includes some fish, poultry, and dairy products, but not a lot of red meat.   Eat less salt (sodium).   Limit the amount of alcohol that you drink:   For females, do not drink more than 1 drink a day.   For males, do not drink more than 1 to 2 drinks a day.  Medicines -- Medicines can help lower the risk of stroke in people who have had a stroke before.  An \"ischemic\" stroke happens when an artery in the brain gets clogged or blocked. As a result, part of the brain does not get enough blood. A transient ischemic attack, or \"TIA,\" is similar, but it does not damage the brain, because the artery reopens on its own.  If you have had a stroke or TIA, your doctor will prescribe medicines to lower your risk of having another stroke. Some medicines work by lowering your blood pressure, blood sugar, or cholesterol. Others help by keeping blood clots from forming. (Blood clots cause many strokes.)  Medicines that are especially important in preventing strokes include:   Medicines to lower blood pressure   Medicines to lower LDL (\"bad\") cholesterol, such as statins   Medicines to prevent blood clots, such as aspirin or blood thinners   Medicines that help keep your blood sugar as close to normal " as possible (if you have diabetes)  Whatever medicines your doctor prescribes, make sure that you take them every day as directed (table 1). If you cannot afford your medicines or if they cause side effects, talk to your doctor or nurse. There are often ways to deal with these problems.  Help with quitting smoking -- If you smoke, ask your doctor or nurse about how to quit. There are strategies and medicines that can improve your chances of success. Studies show that people are most successful at quitting if they take medicines to help them quit and work with a counselor. You might also have a better chance of success if you combine nicotine replacement with one of the prescription medicines that help people quit.  You can also get help from a free phone line (4-694-QUITNOW, or 1-547.626.6144) or online at www.smokefree.gov.  All topics are updated as new evidence becomes available and our peer review process is complete.  This topic retrieved from [employer] on: Feb 26, 2024.  Topic 73224 Version 12.0  Release: 32.2.4 - C32.56  © 2024 UpToDate, Inc. and/or its affiliates. All rights reserved.  table 1: Tips on taking oral medicines  Take your medicines exactly as your doctor, nurse, or pharmacist tells you to.   Use a daily or weekly pill box to organize your medicines.   Keep your medicine containers in a place you will see them every day.   Create reminders for yourself to take your medicines. You can use a calendar, a smartphone carrol, a digital watch, or whatever works to help you remember.   Read the prescription label and printed patient information that comes with each medicine.   Do not skip pills, change doses, or take extra pills unless your doctor tells you to. The dose your doctor prescribes is based on your age, weight, health problems, and other medicines you take. It can be dangerous to take different amounts.   Learn the names of each of your medicines, how it works, and why you take it.   Learn what  each of your medicines looks like (shape, size, color). When you get a refill, you might get a new generic version that looks different from the last time. That is OK as long as the new medicine has the same ingredient as the one it is replacing. If you are worried about a refill looking different, talk to your pharmacist.   Tell your doctor or nurse about any side effects you have. They might have ways to reduce or get rid of the side effects.   Tell your doctor, nurse, or pharmacist if you can't afford your medicines. There are often ways to lower costs.   Make a list of all the medicines you take, plus any medicines you are allergic to or have had problems with in the past. Keep 1 copy at home and 1 in your wallet.   Bring a bag containing all your medicines with you to your doctor's office, every time you go. If you can't do this, bring the list of your medicines. Have your doctor or nurse go over your medicines or list with you.   Talk to your doctor, nurse, or pharmacist before you take any cough, cold, allergy, pain, or other extra medicines. The same goes for supplements and herbal medicines. Over-the-counter and herbal medicines can affect the way prescription medicines work.   Graphic 00938 Version 10.0  Consumer Information Use and Disclaimer   Disclaimer: This generalized information is a limited summary of diagnosis, treatment, and/or medication information. It is not meant to be comprehensive and should be used as a tool to help the user understand and/or assess potential diagnostic and treatment options. It does NOT include all information about conditions, treatments, medications, side effects, or risks that may apply to a specific patient. It is not intended to be medical advice or a substitute for the medical advice, diagnosis, or treatment of a health care provider based on the health care provider's examination and assessment of a patient's specific and unique circumstances. Patients must speak  with a health care provider for complete information about their health, medical questions, and treatment options, including any risks or benefits regarding use of medications. This information does not endorse any treatments or medications as safe, effective, or approved for treating a specific patient. UpToDate, Inc. and its affiliates disclaim any warranty or liability relating to this information or the use thereof.The use of this information is governed by the Terms of Use, available at https://www.woltersKrakenuwer.com/en/know/clinical-effectiveness-terms. 2024© UpToDate, Inc. and its affiliates and/or licensors. All rights reserved.  Copyright   © 2024 UpToDate, Inc. and/or its affiliates. All rights reserved.

## 2025-01-23 NOTE — PROGRESS NOTES
"Daily Note     Today's date: 2025  Patient name: Mateus Mckeon  : 1985  MRN: [id number]  Referring provider: Dania Sher DO  Dx:   Encounter Diagnosis     ICD-10-CM    1. Status post fall  Z91.81       2. Cerebrovascular accident (CVA), unspecified mechanism (HCC)  I63.9       3. Other abnormalities of gait and mobility  R26.89                             POC expires Auth Status Total   Visits  Start date  Expiration date PT/OT + Visit Limit? Co-Insurance   2/10/25 Submitted to FD 25, AV pending pending pending BOMN Yes, $0 co-pay                                               Subjective: Patient reports he cut back on some of his BP meds. \"I'm always going to be dizzy\" but not as dizzy as the other day.     Objective: See treatment diary below  *FLUID RESTRICTION- do not offer water*     BP start of session: 118/80 mmHg  BP end of session:     Per [employer]: >180/110 mmHg at rest, or >250/115 mmHg with activity, need to stop     NMR/TE: Gait Belt and CGA/CS (1 min on/ 2 min off; progressed to 1 minute off mid session)  - Sidestepping, 0UE  - STS no UE  - Walk fwd/bwd, 0 UE  - FWD hurdles 6\"  0 UE  - LAT hurdles 6\" 0 UE  - Step-ups to medium river rocks, 0 UE  - Standing on foam pad with head turns  - Standing on foam pad w/ 5.5# tidal tank trunk rotations  - Standing on foam pad w/ 5.5# tidal tank chest presses  - LAT step ups to 4\" step    - Ambulating without AD throughout session      HEP: walking on treadmill at home and exercises below    Access Code: [email or web address]  URL: [email or web address]  Date: 2025  Prepared by: Marge Gilbert PT, DPT     Exercises  - Sit to Stand with Armchair  - 1 x daily - 7 x weekly - 3 sets - 10 reps  - Seated Long Arc Quad  - 1 x daily - 7 x weekly - 3 sets - 10 reps  - Seated March  - 1 x daily - 7 x weekly - 3 sets - 10 reps    Assessment: Patient tolerated treatment well today, resuming focus on dynamic balance and " gait. He did complete HEP yesterday morning. Did well with 1 min intervals. Pt most fatigued with core exercises on foam pad. Patient would benefit from continued PT to improve balance, endurance, and reduce fall risk.     Plan: Continue per plan of care.          Outcome Measures Initial Eval  1/30/24 PN/DC  9/4/2024 Initial Evaluation  11/18/24 PN  12/24/2024 Re-eval  1/16/2025 Daily Note  1/21/2025   5xSTS 31.25 sec 13.67 sec   no UE defer 16.33 sec,   no UE 21.03 sec, no UE     TUG 16.3 sec with rollator    23.6 sec no AD 11.48 sec   no AD 18.91 sec w/ rollator     17.16 sec no AD 12.18 sec   no AD 15.72 sec, no AD    10 meter  1.11 m/s self selected pace 0.84 m/s self selected pace  1.05 m/s self selected pace Next time> 0.74 m/s    MEDRANO  49/56 32/56 40/56 40/56    FGA 12/30 15/30 defer 12/30 Next time> 11/30   6MWT 900 ft 955 ft no AD defer 825 ft no AD Next time> 700 ft no AD   mCTSIB  FTEO firm  FTEC firm  FTEO foam  FTEC foam   30 sec  30 sec  30 sec  Unable defer 30 sec  30 sec  30 sec  2 sec 30 sec  30 sec  16 sec  2 sec    ABC  66.88% 62.5% 57.5% 49.38%

## 2025-01-28 ENCOUNTER — OFFICE VISIT (OUTPATIENT)
Facility: CLINIC | Age: 40
End: 2025-01-28
Payer: MEDICARE

## 2025-01-28 ENCOUNTER — APPOINTMENT (OUTPATIENT)
Facility: CLINIC | Age: 40
End: 2025-01-28
Payer: MEDICARE

## 2025-01-28 DIAGNOSIS — F41.1 GENERALIZED ANXIETY DISORDER: ICD-10-CM

## 2025-01-28 DIAGNOSIS — N18.6 END STAGE RENAL DISEASE ON DIALYSIS (HCC): ICD-10-CM

## 2025-01-28 DIAGNOSIS — I63.89 CEREBROVASCULAR ACCIDENT (CVA) DUE TO OTHER MECHANISM (HCC): Primary | ICD-10-CM

## 2025-01-28 DIAGNOSIS — I63.9 CEREBROVASCULAR ACCIDENT (CVA), UNSPECIFIED MECHANISM (HCC): Primary | ICD-10-CM

## 2025-01-28 DIAGNOSIS — F33.0 MILD EPISODE OF RECURRENT MAJOR DEPRESSIVE DISORDER (HCC): ICD-10-CM

## 2025-01-28 DIAGNOSIS — R53.1 GENERALIZED WEAKNESS: ICD-10-CM

## 2025-01-28 DIAGNOSIS — Z99.2 END STAGE RENAL DISEASE ON DIALYSIS (HCC): ICD-10-CM

## 2025-01-28 DIAGNOSIS — Z91.81 STATUS POST FALL: ICD-10-CM

## 2025-01-28 DIAGNOSIS — R26.89 OTHER ABNORMALITIES OF GAIT AND MOBILITY: ICD-10-CM

## 2025-01-28 PROCEDURE — 97530 THERAPEUTIC ACTIVITIES: CPT

## 2025-01-28 PROCEDURE — 97112 NEUROMUSCULAR REEDUCATION: CPT

## 2025-01-28 RX ORDER — TRAZODONE HYDROCHLORIDE 50 MG/1
TABLET, FILM COATED ORAL
Qty: 30 TABLET | Refills: 0 | Status: SHIPPED | OUTPATIENT
Start: 2025-01-28 | End: 2025-01-30 | Stop reason: SDUPTHER

## 2025-01-28 NOTE — PROGRESS NOTES
"OT Daily Note     Today's date: 2025  Patient name: Mateus Mckeon  : 1985  MRN: [id number]  Referring provider: Dania Sher DO  Dx:   Encounter Diagnosis     ICD-10-CM    1. Cerebrovascular accident (CVA) due to other mechanism (HCC)  I63.89       2. Generalized weakness  R53.1       3. End stage renal disease on dialysis (HCC)  N18.6     Z99.2                       Start Time: 0800  Stop Time: 0845  Total time in clinic (min): 45 minutes    PLAN OF CARE START: 2025  PLAN OF CARE END: 2025  FREQUENCY: 2x/wk  PRECAUTIONS: Renal failure, actively going through dialysis; type 1 diabetes; HTN; SAH; seizure (last one was )    Subjective: Pt reports he went to his [employer] gymn[employer] meet which was not handicap accessible and he was forced to sit in a plastic folding chair by the doorway. He reports that people were bumping into him constantly and that he \"felt horrible and had to leave senior care through.\" Pt reports after this, he was physically and mentally exhausted and slept the entire next day.    Objective: See treatment diary below    NM  - Nustep x5 min level 1 for RUE proprioceptive input, endurance, and proximal strength  - Standing push ups with needle threads of 3lb dumbbell with unilateral row for RUE proprioceptive, proximal strengthening, and endurance - 3x reps until failure - x6, x5, x6    TA:  - juxacisor with 1.5lb wrist weight donned while dual tasking to name names A-Z to work on divided attention, UE strength, and endurance  - red thick therabar bends up/down while dual tasking to name days of the week backwards, then alphabetical, then months of the year backwards order for UE strength, endurance, and divided attention      Assessment: Pt tolerated session well today. Poor endurance continues to impact tolerance to activities and required rest breaks throughout. Good divided attention during dual tasking today however decreased as cognitive demand increased " today. Pt would benefit from continued skilled occupational therapy services to improve fine motor coordination, dexterity, strength, UE function, and functional cognition.    Plan: Continue per plan of care.       SHORT TERM GOALS ADDED 2/20/24:  Pt will increase sustained attention by completing trail making part A in 35 seconds to complete IADLs NOT MET  Pt will increase divided attention by completing trail making part B in 1 minute 30 seconds to complete IADLs NOT MET  Pt will increase delayed recall and recall 4 /5 items on MOCA to complete IADLs GOAL MET    LONG TERM GOALS ADDED 2/20/24:  Pt will increase sustained attention by completing trail making part A in 38 seconds to complete IADLs  Pt will increase divided attention by completing trail making part B in 1 minute 10 seconds to complete IADLs  Pt will increase delayed recall and recall 5/5 items on MOCA to complete IADLs  Resume right  hand dominance to refined functional assist with all activities of daily living]

## 2025-01-28 NOTE — PROGRESS NOTES
"Daily Note     Today's date: 2025  Patient name: Mateus Mckeon  : 1985  MRN: [id number]  Referring provider: Dania Sher DO  Dx:   Encounter Diagnosis     ICD-10-CM    1. Cerebrovascular accident (CVA), unspecified mechanism (HCC)  I63.9       2. Other abnormalities of gait and mobility  R26.89       3. Status post fall  Z91.81                             POC expires Auth Status Total   Visits  Start date  Expiration date PT/OT + Visit Limit? Co-Insurance   2/10/25 Submitted to FD 25, AV pending pending pending BOMN Yes, $0 co-pay                                               Subjective: Patient reports that he is not feeling good today. He is feeling very fatigued.     Objective: See treatment diary below  *FLUID RESTRICTION- do not offer water*     BP start of session: 128/68 mmHg    Per [employer]: >180/110 mmHg at rest, or >250/115 mmHg with activity, need to stop     NMR/TE: Gait Belt and CGA/CS (1 min on/ 2 min off; progressed to 1 minute off mid session)  - Sidestepping, 0UE  - STS no UE  - Walk fwd/bwd, 0 UE  - FWD hurdles 6\"  0 UE  - LAT hurdles 6\" 0 UE  - Step-ups to medium river rocks, 0 UE  - Ambulation to front: 300 ft    Not today:   - Standing on foam pad with head turns  - Standing on foam pad w/ 5.5# tidal tank trunk rotations  - Standing on foam pad w/ 5.5# tidal tank chest presses  - LAT step ups to 4\" step  - Ambulating without AD throughout session      Assessment: Patient tolerated treatment fairly today with a focus on dynamic balance and gait training. Patient most challenged when instructed to stop/go with forward and backwards ambulation as noted by episodes of imbalance and use of stepping strategy. Vitals WNL throughout entirety of session. Good ability to negotiate lateral hurdles with minimal episodes of M/L instability, suggesting improvements in SLS balance. Patient with decreasing blood glucose levels as treatment progressed; ended " session 5 min early (B) due to patient feeling fatigued (has snack in car). Educated to seek higher medical attention if he has a change in symptoms and he was in understanding. Patient would benefit from continued PT to improve balance, endurance, and reduce fall risk.     Plan: Continue per plan of care.          HEP: walking on treadmill at home and exercises below    Access Code: [email or web address]  URL: [email or web address]  Date: 2025  Prepared by: Marge Gilbert PT, DPT     Exercises  - Sit to Stand with Armchair  - 1 x daily - 7 x weekly - 3 sets - 10 reps  - Seated Long Arc Quad  - 1 x daily - 7 x weekly - 3 sets - 10 reps  - Seated March  - 1 x daily - 7 x weekly - 3 sets - 10 reps    Outcome Measures Initial Eval  24 PN/DC  2024 Initial Evaluation  24 PN  2024 Re-eval  2025 Daily Note  2025   5xSTS 31.25 sec 13.67 sec   no UE defer 16.33 sec,   no UE 21.03 sec, no UE     TUG 16.3 sec with rollator    23.6 sec no AD 11.48 sec   no AD 18.91 sec w/ rollator     17.16 sec no AD 12.18 sec   no AD 15.72 sec, no AD    10 meter  1.11 m/s self selected pace 0.84 m/s self selected pace  1.05 m/s self selected pace Next time> 0.74 m/s    MEDRANO  49/56 32/56 40/56 40/56    FGA 12/30 15/30 defer  Next time>    6MWT 900 ft 955 ft no AD defer 825 ft no AD Next time> 700 ft no AD   mCTSIB  FTEO firm  FTEC firm  FTEO foam  FTEC foam   30 sec  30 sec  30 sec  Unable defer 30 sec  30 sec  30 sec  2 sec 30 sec  30 sec  16 sec  2 sec    ABC  66.88% 62.5% 57.5% 49.38%

## 2025-01-29 NOTE — PSYCH
MEDICATION MANAGEMENT NOTE        [facility]      Name and Date of Birth:  Mateus Mckeon 39 y.o. 1985 MRN: [id number]    Date of Visit: January 30, 2025    Reason for Visit: Follow-up visit for medication management     Virtual Visit Disclaimer:       TeleMed provider: Pedrito Bermeo MD.   Location: [place]     Verification of patient location:     Patient is currently located in the [facility]  Patient is currently located in a state in which I am licensed     After connecting through [employer], the patient was identified by name and date of birth.  Mateus Mckeon was informed that this is a telemedicine visit that is being conducted through [employer], and the patient was informed that this is a secure, HIPAA-compliant platform. My office door was closed. No one else was in the room. Mateus Mckeon acknowledged consent and understanding of privacy and security of the video platform. Mateus understands that the online visit is based solely on information provided by the patient, and that, in the absence of a face-to-face physical evaluation by the physician, the diagnosis Mateus  receives is both limited and provisional in terms of accuracy and completeness. Mateus Mckeon understands that they can discontinue the visit at any time. I informed Mateus that I have reviewed their record in EPIC and presented the opportunity for them to ask any questions regarding the visit today. Mateus Mckeon voiced understanding and consented to these terms. Mateus is aware this is a billable service. Mateus is present at home.      SUBJECTIVE:    Mateus Mckeon is a 39 y.o. male with past psychiatric history significant for major depressive disorder, generalized anxiety, cannabis use (medical) and insomnia who was personally seen and evaluated today at the [facility] outpatient clinic for follow-up and  "medication management. Mateus presents as dysphoric. His thoughts are linear and organized. He completes assessment without difficulty.     Mateus endorses compliance with psychotropic medication regimen consisting of Lexapro, PRN Trazodone, and PRN Atarax. He denies adverse medication side effects. Paulo endorses a challenging 4+ months since last visit. He was admitted medically on numerous occasions secondary to medical concerns, including hypertensive issues. He is subjectively \"on a boat load of BP meds\" which he shares has resulted in anergia, amotivation, dizziness, and gait instability. He was highly encouraged to speak to his nephrology team when attending dialysis this week as they apparently are the HTN treatment providers. Given recent setbacks and medical complications, Paulo shares that he has been mostly isolative and withdrawn. He admits to \"feeling like a burden\". We processed this feeling at length. Both supportive therapy and CBT was utilized. I attempted to unravel cognitive distortions and reframe his thinking. We discussed short-term, objective goals of lessening such a feeling. These goals include: cooking own meals, cleaning his room without mom's assistance, and being able to babysit the kids within the family. We discussed systematic approaches to this via OT/PT. Paulo wa appreciative. Acutely, Paulo reports stable sleep. He is using PRN Trazodone seldomly. His appetite is erratic. No current SI/HI. He continues to attend PT/OT and appointments with his other medical specialists, suggestive of self preservation. Paulo remains on [facility]'s transplant list and we processed his \"frustration\" regarding the delay in this process. Paulo's concentration/focus are erratic at the moment secondary to his medical comorbidities and medication regimen. He reports some hopelessness regarding his health but does respond to verbal redirecting. He continues to experience feelings of apathy and anhedonia. Mateus " "currently endorses occasional and appropriate anxiety that is not pathologic in nature. Mateus denies recent periods of extreme or excessive nervousness, irrational worry, or overt anxiousness. Mateus is not currently restless or tense nor does Mateus feel \"keyed up\" or on-edge. Mateus denies new-onset panic symptomatology or maladaptive behaviors. Throughout today's session, Mateus does not appear visibly perturbed. Acutely, Mateus does not exhibit objective evidence of aziza/hypomania or psychosis. Mateus denies recent ETOH or illicit substance abuse. At the conclusion of the assessment, we did discuss other medication options, to which Mateus said, \"his meds are fine\". He believes a majority of his concerns are medically-oriented and his MH would likely improve once he feels physically more stable and is subjectively \"able to contribute\". Mateus offers no further concerns.       Current Rating Scores:     None completed today.    Review Of Systems:      Constitutional feeling poorly, feeling tired, low energy, and as noted in HPI   ENT dizziness   Cardiovascular low blood pressure   Respiratory negative   Gastrointestinal abdominal discomfort   Genitourinary negative   Musculoskeletal joint pain   Integumentary negative   Neurological dizziness, numbness, and unstable gait   Endocrine negative   Other Symptoms none, all other systems are negative       Past Psychiatric History: (unchanged information from previous note copied and italicized) - Information that is bolded has been updated.      Inpatient psychiatric admissions: Denies  Prior outpatient psychiatric linkage: Denies  Past/current psychotherapy: Currently linked with Jakob Kessler   History of suicidal attempts/gestures: Denies  History of violence/aggressive behaviors: Denies  Psychotropic medication trials: Lexapro (3 years ago), Zoloft, Trazodone (now)  Substance abuse inpatient/outpatient rehabilitation: Denies     Substance Abuse History: " (unchanged information from previous note copied and italicized) - Information that is bolded has been updated.      No history of ETOH or illict substance abuse. Bill does endorse previous tobacco abuse and current medical cannabis prescription. No past legal actions or arrests secondary to substance intoxication. The patient denies prior DWIs/DUIs. No history of outpatient/inpatient rehabilitation programs. Mateus does not exhibit objective evidence of substance withdrawal during today's examination nor does Mateus appear under the influence of any psychoactive substance.          Social History: (unchanged information from previous note copied and italicized) - Information that is bolded has been updated.      Developmental: Denies a history of milestone/developmental delay. Denies a history of in-utero exposure to toxins/illicit substances. There is no documented history of IEP or need for special education. He was born and raised in [place], [place]. He lived there for 27 years. He moved to the [facility] in 2011 to live with his sister and work as . His parents have since relocated and he now lives with them.   Education: college graduate - [employer] class of 2006 - degree in theatre   Marital history: single  Living arrangement, social support: parents - has a sister who has children (nephews and nieces)   Occupational History: on permanent disability  Access to firearms: Denies direct access to weapons/firearms. Mateus Mckeon has no history of arrests or violence with a deadly weapon.      Traumatic History: (unchanged information from previous note copied and italicized) - Information that is bolded has been updated.     Abuse:none is reported  Other Traumatic Events: 2 CVAs - 2015 and 2018      Past Medical History:    Past Medical History:   Diagnosis Date    Acute kidney injury (HCC)     Ambulates with cane     Anuria     Anxiety     Cellulitis of right elbow 03/31/2021    Chronic kidney  disease     COVID-19 10/18/2024    Depression     Diabetes mellitus (HCC)     Diarrhea     Emesis 10/24/2020    End stage renal disease (HCC) 02/11/2018    Formatting of this note might be different from the original. Last Assessment & Plan:  Secondary to DM.  On nightly PD.  Followed by [facility].  Patient considering transplant for kidney and pancreas through [facility] Formatting of this note might be different from the original. Last Assessment & Plan:  Formatting of this note might be different from the original. 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    Esophagitis 07/21/2015    Falls     Gastroparesis     GERD (gastroesophageal reflux disease)     History of shingles 2010    History of transfusion 02/2018    no adverse reaction    Hyperlipidemia     Hyperphosphatemia     Hypertension     Hypoglycemia 07/15/2022    Itching     Mastoiditis of right side 07/15/2022    Muscle weakness     general unsteadiness    Obesity (BMI 30.0-34.9) 09/09/2019    Orthostatic hypotension 10/25/2020    Peripheral polyneuropathy 11/20/2019    PONV (postoperative nausea and vomiting) 01/26/2018    Protein-calorie malnutrition (HCC) 11/23/2020    Recurrent peritonitis (HCC) due to peritoneal dialysis catheter 07/31/2020    Retinopathy     Seizures (HCC)     early 2020 - one time    Skin abnormality     some dime size areas where skin was scratched from itching    Sleep apnea     Spontaneous bacterial peritonitis (HCC) 10/19/2020    Squamous cell skin cancer     left temple    Stroke (HCC)     x2 - off balance/no driving/fatigue    Swelling of both lower extremities     Swelling of joint of upper arm, right 04/03/2024    Traumatic onycholysis 07/21/2022    Vomiting     Wears glasses     Word finding difficulty 11/05/2024        Past Surgical History:   Procedure Laterality Date    CARDIAC ELECTROPHYSIOLOGY PROCEDURE N/A 9/21/2023    Procedure: Cardiac loop recorder explant;  Surgeon: Parish Morgan,  MD;  Location: [facility];  Service: Cardiology    CARDIAC LOOP RECORDER  05/2018    COLONOSCOPY      EGD      EYE SURGERY Right     HEMODIALYSIS ADULT  11/6/2024    IR AV FISTULAGRAM/GRAFTOGRAM  02/23/2021    IR CEREBRAL ANGIOGRAPHY  1/12/2024    IR CEREBRAL ANGIOGRAPHY / INTERVENTION  1/5/2024    IR TUNNELED CENTRAL LINE PLACEMENT  02/16/2021    IR TUNNELED DIALYSIS CATHETER PLACEMENT  11/18/2020    IR TUNNELED DIALYSIS CATHETER REMOVAL  02/12/2021    IR TUNNELED DIALYSIS CATHETER REMOVAL  03/11/2021    MOHS SURGERY Left 12/14/2022    Left temple with Dr. Hassan    PERITONEAL CATHETER INSERTION N/A 08/27/2018    Procedure: UNROOF PD CATHETER;  Surgeon: Felipe Lnido DO;  Location: [facility];  Service: General    GA ARTERIOVENOUS ANASTOMOSIS OPEN DIRECT Left 11/09/2020    Procedure: CREATION FISTULA  ARTERIOVENOUS (AV) - LEFT WRIST;  Surgeon: Placido Altamirano MD;  Location: [facility];  Service: Vascular    GA ESOPHAGOGASTRODUODENOSCOPY TRANSORAL DIAGNOSTIC N/A 04/18/2019    Procedure: ESOPHAGOGASTRODUODENOSCOPY (EGD);  Surgeon: Ale Figueroa MD;  Location: [facility];  Service: Gastroenterology    GA LAPS INSERTION TUNNELED INTRAPERITONEAL CATHETER N/A 08/06/2018    Procedure: LAPAROSCOPIC PD CATHETER PLACEMENT;  Surgeon: Felipe Lindo DO;  [facility];  Service: General    GA REMOVAL TUNNELED INTRAPERITONEAL CATHETER N/A 11/18/2020    Procedure: REMOVAL CATHETER PERITONEAL DIALYSIS;  Surgeon: Abdifatah Ty MD;  [facility];  Service: General    TONSILLECTOMY      UPPER GASTROINTESTINAL ENDOSCOPY       Allergies   Allergen Reactions    Sulfa Antibiotics Rash       Substance Abuse History:    Social History     Substance and Sexual Activity   Alcohol Use Not Currently     Social History     Substance and Sexual Activity   Drug Use Yes    Types: Marijuana    Comment: medical marijuana last vaped 8/1/2024       Social History:    Social History     Socioeconomic History    Marital status:  Single     Spouse name: Not on file    Number of children: Not on file    Years of education: Not on file    Highest education level: Not on file   Occupational History    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   Tobacco Use    Smoking status: Former     Current packs/day: 0.00     Average packs/day: 0.5 packs/day for 12.0 years (6.0 ttl pk-yrs)     Types: Cigarettes     Start date: 2006     Quit date: 2018     Years since quittin.0    Smokeless tobacco: Never    Tobacco comments:     quit 2018   Vaping Use    Vaping status: Every Day    Substances: THC, CBD   Substance and Sexual Activity    Alcohol use: Not Currently    Drug use: Yes     Types: Marijuana     Comment: medical marijuana last vaped 2024    Sexual activity: Not Currently   Other Topics Concern    Not on file   Social History Narrative    Caffeine use    single     Social Drivers of Health     Financial Resource Strain: Low Risk  (2023)    Overall Financial Resource Strain (CARDIA)     Difficulty of Paying Living Expenses: Not hard at all   Food Insecurity: No Food Insecurity (1/10/2025)    Nursing - Inadequate Food Risk Classification     Worried About Running Out of Food in the Last Year: Never true     Ran Out of Food in the Last Year: Never true     Ran Out of Food in the Last Year: Never true   Transportation Needs: No Transportation Needs (1/10/2025)    Nursing - Transportation Risk Classification     Lack of Transportation: Not on file     Lack of Transportation: No   Physical Activity: Not on file   Stress: Not on file   Social Connections: Unknown (2024)    Received from [employer]    Social Spoqa     How often do you feel lonely or isolated from those around you? (Adult - for ages 18 years and over): Not on file   Intimate Partner Violence: Unknown (1/10/2025)    Nursing IPS     Feels Physically and Emotionally Safe: Not on file     Physically Hurt by Someone: Not on file     Humiliated or  Emotionally Abused by Someone: Not on file     Physically Hurt by Someone: No     Hurt or Threatened by Someone: No   Housing Stability: Unknown (1/10/2025)    Nursing: Inadequate Housing Risk Classification     Has Housing: Not on file     Worried About Losing Housing: Not on file     Unable to Get Utilities: Not on file     Unable to Pay for Housing in the Last Year: No     Has Housin       Family Psychiatric History:     Family History   Problem Relation Age of Onset    Breast cancer Mother     Hypertension Mother     Hyperlipidemia Father     Hypertension Father     Leukemia Maternal Grandmother     Hyperlipidemia Maternal Grandfather     Hypertension Maternal Grandfather     Hyperlipidemia Paternal Grandmother     Hypertension Paternal Grandmother     Heart disease Paternal Grandfather         cardiac disorder    Diabetes Paternal Grandfather        History Review: The following portions of the patient's history were reviewed and updated as appropriate: allergies, current medications, past family history, past medical history, past social history, past surgical history, and problem list.         OBJECTIVE:     Vital signs in last 24 hours:    There were no vitals filed for this visit.    Mental Status Evaluation:    Appearance age appropriate, casually dressed, dressed appropriately, looks stated age   Behavior pleasant, cooperative, calm, good eye contact   Speech normal rate, normal volume, normal pitch   Mood dysphoric   Affect constricted   Thought Processes organized, logical, goal directed   Associations intact associations   Thought Content no overt delusions   Perceptual Disturbances: no auditory hallucinations, no visual hallucinations   Abnormal Thoughts  Risk Potential Suicidal ideation - None at present  Homicidal ideation - None at present  Potential for aggression - No   Orientation oriented to person, place, time/date, and situation   Memory Did not formally test   Consciousness alert and  awake   Attention Span Concentration Span attention span and concentration are age appropriate   Intellect appears to be of average intelligence   Insight fair   Judgement fair   Muscle Strength and  Gait unable to assess today due to virtual visit   Motor activity no abnormal movements   Language no difficulty naming common objects   Fund of Knowledge adequate knowledge of current events   Pain mild   Pain Scale Did not ask patient to formally rate       Laboratory Results: I have personally reviewed all pertinent laboratory/tests results    Recent Labs:   Admission on 01/17/2025, Discharged on 01/17/2025   Component Date Value    POC Glucose 01/17/2025 154 (H)     Ventricular Rate 01/17/2025 69     Atrial Rate 01/17/2025 69     HI Interval 01/17/2025 176     QRSD Interval 01/17/2025 74     QT Interval 01/17/2025 444     QTC Interval 01/17/2025 475     P Axis 01/17/2025 37     QRS [place] 01/17/2025 28     T Wave [place] 01/17/2025 118     WBC 01/17/2025 4.95     RBC 01/17/2025 3.68 (L)     Hemoglobin 01/17/2025 12.0     Hematocrit 01/17/2025 36.7     MCV 01/17/2025 100 (H)     MCH 01/17/2025 32.6     MCHC 01/17/2025 32.7     RDW 01/17/2025 15.8 (H)     MPV 01/17/2025 11.1     Platelets 01/17/2025 222     nRBC 01/17/2025 0     Segmented % 01/17/2025 56     Immature Grans % 01/17/2025 0     Lymphocytes % 01/17/2025 27     Monocytes % 01/17/2025 9     Eosinophils Relative 01/17/2025 7 (H)     Basophils Relative 01/17/2025 1     Absolute Neutrophils 01/17/2025 2.77     Absolute Immature Grans 01/17/2025 0.02     Absolute Lymphocytes 01/17/2025 1.31     Absolute Monocytes 01/17/2025 0.42     Eosinophils Absolute 01/17/2025 0.36     Basophils Absolute 01/17/2025 0.07     Sodium 01/17/2025 137     Potassium 01/17/2025 5.1     Chloride 01/17/2025 95 (L)     CO2 01/17/2025 30     ANION GAP 01/17/2025 12     BUN 01/17/2025 37 (H)     Creatinine 01/17/2025 9.42 (H)     Glucose 01/17/2025 153 (H)     Calcium 01/17/2025 9.0     AST  01/17/2025 15     ALT 01/17/2025 8     Alkaline Phosphatase 01/17/2025 75     Total Protein 01/17/2025 6.4     Albumin 01/17/2025 4.1     Total Bilirubin 01/17/2025 0.57     eGFR 01/17/2025 6     Magnesium 01/17/2025 2.7    Admission on 01/09/2025, Discharged on 01/13/2025   Component Date Value    WBC 01/09/2025 5.58     RBC 01/09/2025 3.36 (L)     Hemoglobin 01/09/2025 11.1 (L)     Hematocrit 01/09/2025 33.9 (L)     MCV 01/09/2025 101 (H)     MCH 01/09/2025 33.0     MCHC 01/09/2025 32.7     RDW 01/09/2025 16.0 (H)     MPV 01/09/2025 10.0     Platelets 01/09/2025 274     nRBC 01/09/2025 0     Segmented % 01/09/2025 53     Immature Grans % 01/09/2025 1     Lymphocytes % 01/09/2025 30     Monocytes % 01/09/2025 9     Eosinophils Relative 01/09/2025 6     Basophils Relative 01/09/2025 1     Absolute Neutrophils 01/09/2025 3.03     Absolute Immature Grans 01/09/2025 0.03     Absolute Lymphocytes 01/09/2025 1.65     Absolute Monocytes 01/09/2025 0.48     Eosinophils Absolute 01/09/2025 0.33     Basophils Absolute 01/09/2025 0.06     Sodium 01/09/2025 140     Potassium 01/09/2025 4.4     Chloride 01/09/2025 97     CO2 01/09/2025 34 (H)     ANION GAP 01/09/2025 9     BUN 01/09/2025 19     Creatinine 01/09/2025 9.37 (H)     Glucose 01/09/2025 190 (H)     Calcium 01/09/2025 8.5     AST 01/09/2025 20     ALT 01/09/2025 11     Alkaline Phosphatase 01/09/2025 86     Total Protein 01/09/2025 5.8 (L)     Albumin 01/09/2025 4.0     Total Bilirubin 01/09/2025 0.53     eGFR 01/09/2025 6     hs TnI 0hr 01/09/2025 20     Ventricular Rate 01/09/2025 66     Atrial Rate 01/09/2025 66     [place] Interval 01/09/2025 184     QRSD Interval 01/09/2025 72     QT Interval 01/09/2025 466     QTC Interval 01/09/2025 488     P Axis 01/09/2025 77     QRS [place] 01/09/2025 65     T Wave [place] 01/09/2025 35     hs TnI 2hr 01/10/2025 17     Delta 2hr hsTnI 01/10/2025 -3     Sodium 01/10/2025 139     Potassium 01/10/2025 4.4     Chloride 01/10/2025 99      CO2 01/10/2025 32     ANION GAP 01/10/2025 8     BUN 01/10/2025 20     Creatinine 01/10/2025 9.05 (H)     Glucose 01/10/2025 132     Calcium 01/10/2025 8.3 (L)     eGFR 01/10/2025 6     WBC 01/11/2025 5.77     RBC 01/11/2025 3.48 (L)     Hemoglobin 01/11/2025 11.3 (L)     Hematocrit 01/11/2025 35.2 (L)     MCV 01/11/2025 101 (H)     MCH 01/11/2025 32.5     MCHC 01/11/2025 32.1     RDW 01/11/2025 16.7 (H)     Platelets 01/11/2025 246     MPV 01/11/2025 10.3     MRSA Culture Only 01/11/2025 No Methicillin Resistant Staphlyococcus aureus (MRSA) isolated     POC Glucose 01/11/2025 163 (H)     POC Glucose 01/11/2025 266 (H)     WBC 01/12/2025 6.22     RBC 01/12/2025 3.45 (L)     Hemoglobin 01/12/2025 11.2 (L)     Hematocrit 01/12/2025 34.7 (L)     MCV 01/12/2025 101 (H)     MCH 01/12/2025 32.5     MCHC 01/12/2025 32.3     RDW 01/12/2025 17.0 (H)     Platelets 01/12/2025 219     MPV 01/12/2025 9.9     Sodium 01/12/2025 137     Potassium 01/12/2025 5.1     Chloride 01/12/2025 98     CO2 01/12/2025 29     ANION GAP 01/12/2025 10     BUN 01/12/2025 31 (H)     Creatinine 01/12/2025 10.26 (H)     Glucose 01/12/2025 196 (H)     Calcium 01/12/2025 8.2 (L)     eGFR 01/12/2025 5     POC Glucose 01/12/2025 216 (H)     Vitamin B-12 01/12/2025 500     Folate 01/12/2025 16.4     Iron Saturation 01/12/2025 36     TIBC 01/12/2025 151.2 (L)     Iron 01/12/2025 54     Transferrin 01/12/2025 108 (L)     UIBC 01/12/2025 97 (L)     Ferritin 01/12/2025 264     POC Glucose 01/12/2025 273 (H)     POC Glucose 01/12/2025 179 (H)     POC Glucose 01/12/2025 154 (H)     Methylmalonic Acid, S 01/13/2025 879 (H)     WBC 01/13/2025 6.61     RBC 01/13/2025 3.34 (L)     Hemoglobin 01/13/2025 11.1 (L)     Hematocrit 01/13/2025 34.0 (L)     MCV 01/13/2025 102 (H)     MCH 01/13/2025 33.2     MCHC 01/13/2025 32.6     RDW 01/13/2025 17.0 (H)     Platelets 01/13/2025 218     MPV 01/13/2025 10.6     Albumin 01/13/2025 3.6     Calcium 01/13/2025 7.8 (L)      Phosphorus 01/13/2025 7.3 (H)     Glucose 01/13/2025 156 (H)     BUN 01/13/2025 44 (H)     Creatinine 01/13/2025 11.76 (H)     Sodium 01/13/2025 137     Potassium 01/13/2025 5.8 (H)     Chloride 01/13/2025 99     CO2 01/13/2025 27     ANION GAP 01/13/2025 11     eGFR 01/13/2025 4     Magnesium 01/13/2025 2.7     POC Glucose 01/13/2025 164 (H)     Sodium 01/13/2025 137     Potassium 01/13/2025 5.1     Chloride 01/13/2025 100     CO2 01/13/2025 27     ANION GAP 01/13/2025 10     BUN 01/13/2025 41 (H)     Creatinine 01/13/2025 10.77 (H)     Glucose 01/13/2025 140     Calcium 01/13/2025 8.0 (L)     eGFR 01/13/2025 5     Magnesium 01/13/2025 2.3     POC Glucose 01/13/2025 207 (H)     Ventricular Rate 01/13/2025 70     Atrial Rate 01/13/2025 70     MI Interval 01/13/2025 174     QRSD Interval 01/13/2025 66     QT Interval 01/13/2025 442     QTC Interval 01/13/2025 477     P [place] 01/13/2025 7     QRS [place] 01/13/2025 40     T Wave [place] 01/13/2025 -31    Admission on 01/04/2025, Discharged on 01/07/2025   Component Date Value    WBC 01/04/2025 5.30     RBC 01/04/2025 3.65 (L)     Hemoglobin 01/04/2025 11.8 (L)     Hematocrit 01/04/2025 36.4 (L)     MCV 01/04/2025 100 (H)     MCH 01/04/2025 32.3     MCHC 01/04/2025 32.4     RDW 01/04/2025 14.7     MPV 01/04/2025 10.0     Platelets 01/04/2025 227     nRBC 01/04/2025 0     Segmented % 01/04/2025 59     Immature Grans % 01/04/2025 1     Lymphocytes % 01/04/2025 26     Monocytes % 01/04/2025 7     Eosinophils Relative 01/04/2025 6     Basophils Relative 01/04/2025 1     Absolute Neutrophils 01/04/2025 3.16     Absolute Immature Grans 01/04/2025 0.03     Absolute Lymphocytes 01/04/2025 1.37     Absolute Monocytes 01/04/2025 0.36     Eosinophils Absolute 01/04/2025 0.33     Basophils Absolute 01/04/2025 0.05     Sodium 01/04/2025 141     Potassium 01/04/2025 4.6     Chloride 01/04/2025 98     CO2 01/04/2025 32     ANION GAP 01/04/2025 11     BUN 01/04/2025 22     Creatinine  01/04/2025 9.02 (H)     Glucose 01/04/2025 162 (H)     Calcium 01/04/2025 9.1     AST 01/04/2025 19     ALT 01/04/2025 12     Alkaline Phosphatase 01/04/2025 95     Total Protein 01/04/2025 6.6     Albumin 01/04/2025 4.4     Total Bilirubin 01/04/2025 0.59     eGFR 01/04/2025 6     hs TnI 0hr 01/04/2025 23     Protime 01/04/2025 12.9     INR 01/04/2025 0.90     PTT 01/04/2025 31     BNP 01/04/2025 957 (H)     Magnesium 01/04/2025 2.5     Ventricular Rate 01/04/2025 69     Atrial Rate 01/04/2025 69     SD Interval 01/04/2025 174     QRSD Interval 01/04/2025 68     QT Interval 01/04/2025 440     QTC Interval 01/04/2025 471     P Axis 01/04/2025 61     QRS Axis 01/04/2025 60     T Wave [place] 01/04/2025 32     SARS COV Rapid Antigen 01/04/2025 Negative     Influenza A Rapid Antigen 01/04/2025 Negative     Influenza B Rapid Antigen 01/04/2025 Positive (A)     hs TnI 2hr 01/04/2025 15     Delta 2hr hsTnI 01/04/2025 -8     hs TnI 4hr 01/04/2025 17     Delta 4hr hsTnI 01/04/2025 -6     Platelets 01/04/2025 189     MPV 01/04/2025 10.0     WBC 01/05/2025 4.80     RBC 01/05/2025 3.36 (L)     Hemoglobin 01/05/2025 10.9 (L)     Hematocrit 01/05/2025 33.3 (L)     MCV 01/05/2025 99 (H)     MCH 01/05/2025 32.4     MCHC 01/05/2025 32.7     RDW 01/05/2025 14.8     MPV 01/05/2025 10.5     Platelets 01/05/2025 211     nRBC 01/05/2025 0     Segmented % 01/05/2025 55     Immature Grans % 01/05/2025 0     Lymphocytes % 01/05/2025 28     Monocytes % 01/05/2025 9     Eosinophils Relative 01/05/2025 7 (H)     Basophils Relative 01/05/2025 1     Absolute Neutrophils 01/05/2025 2.65     Absolute Immature Grans 01/05/2025 0.02     Absolute Lymphocytes 01/05/2025 1.32     Absolute Monocytes 01/05/2025 0.44     Eosinophils Absolute 01/05/2025 0.32     Basophils Absolute 01/05/2025 0.05     Sodium 01/05/2025 142     Potassium 01/05/2025 4.4     Chloride 01/05/2025 101     CO2 01/05/2025 30     ANION GAP 01/05/2025 11     BUN 01/05/2025 24      Creatinine 01/05/2025 9.71 (H)     Glucose 01/05/2025 144 (H)     Calcium 01/05/2025 8.5     AST 01/05/2025 18     ALT 01/05/2025 9     Alkaline Phosphatase 01/05/2025 75     Total Protein 01/05/2025 5.8 (L)     Albumin 01/05/2025 3.9     Total Bilirubin 01/05/2025 0.50     eGFR 01/05/2025 5     Magnesium 01/05/2025 2.4     Phosphorus 01/05/2025 4.8 (H)     Protime 01/05/2025 14.0     INR 01/05/2025 1.01     POC Glucose 01/05/2025 154 (H)     POC Glucose 01/05/2025 165 (H)     POC Glucose 01/05/2025 190 (H)     POC Glucose 01/05/2025 214 (H)     WBC 01/06/2025 5.07     RBC 01/06/2025 3.41 (L)     Hemoglobin 01/06/2025 11.1 (L)     Hematocrit 01/06/2025 33.9 (L)     MCV 01/06/2025 99 (H)     MCH 01/06/2025 32.6     MCHC 01/06/2025 32.7     RDW 01/06/2025 14.9     Platelets 01/06/2025 243     MPV 01/06/2025 10.1     Sodium 01/06/2025 140     Potassium 01/06/2025 4.4     Chloride 01/06/2025 100     CO2 01/06/2025 29     ANION GAP 01/06/2025 11     BUN 01/06/2025 32 (H)     Creatinine 01/06/2025 12.10 (H)     Glucose 01/06/2025 153 (H)     Calcium 01/06/2025 8.5     eGFR 01/06/2025 4     Magnesium 01/06/2025 2.4     MRSA Culture Only 01/06/2025 No Methicillin Resistant Staphlyococcus aureus (MRSA) isolated     POC Glucose 01/06/2025 139     POC Glucose 01/06/2025 144 (H)     POC Glucose 01/06/2025 213 (H)     POC Glucose 01/06/2025 214 (H)     WBC 01/07/2025 4.56     RBC 01/07/2025 3.27 (L)     Hemoglobin 01/07/2025 10.6 (L)     Hematocrit 01/07/2025 33.0 (L)     MCV 01/07/2025 101 (H)     MCH 01/07/2025 32.4     MCHC 01/07/2025 32.1     RDW 01/07/2025 14.9     Platelets 01/07/2025 282     MPV 01/07/2025 10.5     Sodium 01/07/2025 141     Potassium 01/07/2025 4.2     Chloride 01/07/2025 102     CO2 01/07/2025 29     ANION GAP 01/07/2025 10     BUN 01/07/2025 21     Creatinine 01/07/2025 8.77 (H)     Glucose 01/07/2025 133     Calcium 01/07/2025 8.1 (L)     eGFR 01/07/2025 6     Magnesium 01/07/2025 2.3     Phosphorus  01/07/2025 4.8 (H)     POC Glucose 01/07/2025 214 (H)     POC Glucose 01/07/2025 173 (H)        Suicide/Homicide Risk Assessment:    The following interventions are recommended: continue medication management, contracts for safety at present - agrees to go to ED if feeling unsafe      Lethality Statement:    Based on today's assessment and clinical criteria, Mateus Mckeon contracts for safety and is not an imminent risk of harm to self or others. Outpatient level of care is deemed appropriate at this current time. Mateus understands that if they can no longer contract for safety, they need to call the office or report to their nearest Emergency Room for immediate evaluation.      Assessment/Plan:     Mateus Mckeon is a 39 y.o. male with past psychiatric history significant for major depressive disorder, generalized anxiety, cannabis use (medical) and insomnia who was personally seen and evaluated today at the [facility] outpatient clinic for follow-up and medication management. Paulo endorses an extensive medical history complicated by 2 cerebral vascular accidents in 2015 and 2018. Paulo states that the etiology of these CVAs is unknown but suspected to be secondary to unmanaged diabetes mellitus, hypertension, and history of nicotine use. Paulo states that his second CVA in 2018 was far more debilitating than the first and result in significant impairment in occupational and social functionality. He now ambulates slowly with a walking cane and is on disability. He is no longer independent and requires transportation assistance via family. He also had to move back in with his parents which has been distressing. Over the last few years, Paulo states that he was placed on the [facility] renal transplant list and was awaiting a harvested kidney. In October 2020, in the context of familial discord and recent verbal disagreement with mother, Paulo voiced vague and fleeting thoughts of self-harm  "to his dialysis nurse. She was perturbed by these statements and referred him to his PCP. While being evaluated by his PCP, Paulo was then sent to the ED where he was ultimately given psychiatric resources in the community and discharged home. Unfortunately, as a result of this incident, Paulo was removed from the transplant list for \"1 year\". This is particularly disconcerting as Paulo did not actually harm himself or engage in any self-destructive behavior. He was transparent with his treatment team and emotional raw after a disagreement with his mother. By undergoing dialysis at the exact moment he voiced these concerns to his nurse, he was seeking treatment and thus, acting in a self-preserving manner, suggestive of a will to live. Paulo is frustrated regarding being removed from the renal transplant process and is seeking transplant via [facility]. His frustration regarding this process again, is representative of future-orientation and a will to live.       Historically, Mateus denies most neurovegetative symptomatology suggestive of major depressive disorder or dysthymia. Mateus does admit to fragmented or non-restorative sleep that ultimately contributes to anergia and amotivation. He has lost his sense of connectivity and purpose as a result of his CVAs and physical limitations, however, he is engaging in therapy and now medication management to regain these. Mateus adamantly denies acute thoughts of suicide or self-harm. Paulo denies acute anxiety that is overwhelming but does repot historical symptomatology suggestive of pathologic/overwhelming anxiety. He does not experience daily or weekly panic attacks. He admits to mild nervousness and fearfulness regarding his health, which is appropriate. He does not feel on-edge, restless, or perpetually irritable. Mateus vehemently denies any acute or chronic history suggestive of an underlying affective (bipolar) organization. rachel denies historical " symptomatology suggestive of an underlying psychotic process.      Today's Plan:     Psychopharmacologically, Paulo reports benefit and tolerability with current regimen. He denies lethality concerns or any interest in medication change or augmentation, which was discussed.      DSM-V Diagnoses:      1.) Major depressive disorder (moderate, recurrent)  2.) Generalized Anxiety Disorder  3.) Medical Cannabis Use  4.) Insomnia        Treatment Recommendations/Precautions:        1.) Major depressive disorder (moderate, recurrent)  - Continue Lexapro 20mg Daily     2.) Generalized Anxiety Disorder  - Continue Lexapro 20mg Daily  - Continue PRN Hydroxyzine 10mg         3.) Medical Cannabis Use  - Counseled to avoid ETOH, illict substances, and nicotine secondary to the detrimental effects of these substances on mental and physical health  - No concerns for misuse         4.) Insomnia   - Continue Trazodone 25mg QHS PRN   Assessment & Plan  Generalized anxiety disorder         Moderate episode of recurrent major depressive disorder (HCC)         Psychophysiological insomnia             Does not want any medication changes  Aware of need to follow up with family physician for medical issues  Aware of 24 hour and weekend coverage for urgent situations accessed by calling [facility] main practice number    Medications Risks/Benefits      Risks, Benefits And Possible Side Effects Of Medications:    Risks, benefits, and possible side effects of medications explained to Mateus including risk of suicidality and serotonin syndrome related to treatment with antidepressants. He verbalizes understanding and agreement for treatment.    Controlled Medication Discussion:     Not applicable    Psychotherapy Provided:     Individual psychotherapy provided: Yes  Counseling was provided during the session today for 18 minutes.  Medications, treatment progress and treatment plan reviewed with Mateus.  Medication  education provided to Mateus.  Recent stressors discussed with Mateus including family issues, health issues, medical problems, recent medication change, social difficulties, everyday stressors, occasional anxiety, and chronic mental illness.  Coping skills reviewed with Mateus.   Supportive therapy provided.   Cognitive therapy was utilized during the session.     Treatment Plan:    Completed and signed during the session: Yes - Treatment Plan done but not signed at time of office visit due to:  Plan reviewed by video and verbal consent given due to virtual visit.      Visit Time    Visit Start Time: 10:00 AM  Visit Stop Time: 10:28 AM  Total Visit Duration:  28 minutes     The total visit duration detailed above includes: patient engagement, medication management, psychotherapy/counseling, discussion regarding treatment goals, documentation, review of past medical records, and coordination of care.      Note Share Disclaimer:     This note was not shared with the patient due to reasonable likelihood of causing patient harm      Pedrito Bermeo MD  Board Certified Diplomate of the American Board of Psychiatry and Neurology  01/30/25

## 2025-01-30 ENCOUNTER — TELEMEDICINE (OUTPATIENT)
Dept: PSYCHIATRY | Facility: CLINIC | Age: 40
End: 2025-01-30
Payer: MEDICARE

## 2025-01-30 ENCOUNTER — OFFICE VISIT (OUTPATIENT)
Facility: CLINIC | Age: 40
End: 2025-01-30
Payer: MEDICARE

## 2025-01-30 ENCOUNTER — TELEPHONE (OUTPATIENT)
Age: 40
End: 2025-01-30

## 2025-01-30 DIAGNOSIS — I63.9 CEREBROVASCULAR ACCIDENT (CVA), UNSPECIFIED MECHANISM (HCC): Primary | ICD-10-CM

## 2025-01-30 DIAGNOSIS — Z99.2 END STAGE RENAL DISEASE ON DIALYSIS (HCC): ICD-10-CM

## 2025-01-30 DIAGNOSIS — N18.6 END STAGE RENAL DISEASE ON DIALYSIS (HCC): ICD-10-CM

## 2025-01-30 DIAGNOSIS — F51.04 PSYCHOPHYSIOLOGICAL INSOMNIA: ICD-10-CM

## 2025-01-30 DIAGNOSIS — R26.89 OTHER ABNORMALITIES OF GAIT AND MOBILITY: ICD-10-CM

## 2025-01-30 DIAGNOSIS — I63.89 CEREBROVASCULAR ACCIDENT (CVA) DUE TO OTHER MECHANISM (HCC): Primary | ICD-10-CM

## 2025-01-30 DIAGNOSIS — F33.1 MODERATE EPISODE OF RECURRENT MAJOR DEPRESSIVE DISORDER (HCC): Chronic | ICD-10-CM

## 2025-01-30 DIAGNOSIS — R53.1 GENERALIZED WEAKNESS: ICD-10-CM

## 2025-01-30 DIAGNOSIS — Z91.81 STATUS POST FALL: ICD-10-CM

## 2025-01-30 DIAGNOSIS — F41.1 GENERALIZED ANXIETY DISORDER: Primary | Chronic | ICD-10-CM

## 2025-01-30 LAB
ATRIAL RATE: 69 BPM
P AXIS: 61 DEGREES
PR INTERVAL: 174 MS
QRS AXIS: 60 DEGREES
QRSD INTERVAL: 68 MS
QT INTERVAL: 440 MS
QTC INTERVAL: 471 MS
T WAVE AXIS: 32 DEGREES
VENTRICULAR RATE: 69 BPM

## 2025-01-30 PROCEDURE — 97112 NEUROMUSCULAR REEDUCATION: CPT

## 2025-01-30 PROCEDURE — 97112 NEUROMUSCULAR REEDUCATION: CPT | Performed by: PHYSICAL THERAPIST

## 2025-01-30 PROCEDURE — 97530 THERAPEUTIC ACTIVITIES: CPT

## 2025-01-30 PROCEDURE — 97110 THERAPEUTIC EXERCISES: CPT

## 2025-01-30 PROCEDURE — 99213 OFFICE O/P EST LOW 20 MIN: CPT | Performed by: STUDENT IN AN ORGANIZED HEALTH CARE EDUCATION/TRAINING PROGRAM

## 2025-01-30 PROCEDURE — G2211 COMPLEX E/M VISIT ADD ON: HCPCS | Performed by: STUDENT IN AN ORGANIZED HEALTH CARE EDUCATION/TRAINING PROGRAM

## 2025-01-30 PROCEDURE — 90833 PSYTX W PT W E/M 30 MIN: CPT | Performed by: STUDENT IN AN ORGANIZED HEALTH CARE EDUCATION/TRAINING PROGRAM

## 2025-01-30 RX ORDER — TRAZODONE HYDROCHLORIDE 50 MG/1
50 TABLET, FILM COATED ORAL
Qty: 30 TABLET | Refills: 1 | Status: SHIPPED | OUTPATIENT
Start: 2025-01-30

## 2025-01-30 RX ORDER — ESCITALOPRAM OXALATE 20 MG/1
20 TABLET ORAL DAILY
Qty: 90 TABLET | Refills: 1 | Status: SHIPPED | OUTPATIENT
Start: 2025-01-30

## 2025-01-30 NOTE — TELEPHONE ENCOUNTER
Patients mother called in with question on how the patient will connect for his virtual appt with Dr Bermeo at 10 am. Writer was able to explain to her how to connect.

## 2025-01-30 NOTE — PROGRESS NOTES
"OT Daily Note     Today's date: 2025  Patient name: Mateus Mckeon  : 1985  MRN: [id number]  Referring provider: Dania Sher DO  Dx:   Encounter Diagnosis     ICD-10-CM    1. Cerebrovascular accident (CVA) due to other mechanism (HCC)  I63.89       2. Generalized weakness  R53.1       3. End stage renal disease on dialysis (HCC)  N18.6     Z99.2                         Start Time: 0800  Stop Time: 0845  Total time in clinic (min): 45 minutes    PLAN OF CARE START: 2025  PLAN OF CARE END: 2025  FREQUENCY: 2x/wk  PRECAUTIONS: Renal failure, actively going through dialysis; type 1 diabetes; HTN; SAH; seizure (last one was )    Subjective: Pt reports \"I feel the best I have in a few weeks today... don't get me wrong I still feel pretty bad but better than usual.\"    Objective: See treatment diary below    NM  - Nustep x5 min level 1 for RUE proprioceptive input, endurance, and proximal strength  - Standing push ups with shoulder taps with 2lb wrist weights donned to work on RUE proprioceptive, biceps strengthening, proximal strengthening, and endurance - 3x reps until failure - x10, x10, x6    TE  - chest press/tricep extension with red theraband to improve RUE strength and endurance, after 2 x10 Pt became dizzy and vitals assessed, activity terminated at this time    TA:  - marble Bebo activity completed with [place] [employer] Photoelectricityezers to work on FMC/dexterity, hand strength    Vitals:   - /77 HR 79BPM - Pt c/o dizziness    Assessment: Pt tolerated session well today. He was able to tolerate increased activity at the start of todays session and has better energy, however about half-way through became dizzy and BP was assessed at 124/77 which is low for this Pt. Seated FMC activities were performed after this due to fatigue and dizziness and he tolerated these well. Poor endurance continues to impact tolerance to activities and required rest breaks throughout. Pt would benefit from continued " skilled occupational therapy services to improve fine motor coordination, dexterity, strength, UE function, and functional cognition.    Plan: Continue per plan of care.       SHORT TERM GOALS ADDED 2/20/24:  Pt will increase sustained attention by completing trail making part A in 35 seconds to complete IADLs NOT MET  Pt will increase divided attention by completing trail making part B in 1 minute 30 seconds to complete IADLs NOT MET  Pt will increase delayed recall and recall 4 /5 items on MOCA to complete IADLs GOAL MET    LONG TERM GOALS ADDED 2/20/24:  Pt will increase sustained attention by completing trail making part A in 38 seconds to complete IADLs  Pt will increase divided attention by completing trail making part B in 1 minute 10 seconds to complete IADLs  Pt will increase delayed recall and recall 5/5 items on MOCA to complete IADLs  Resume right  hand dominance to refined functional assist with all activities of daily living]

## 2025-01-30 NOTE — PROGRESS NOTES
"Daily Note     Today's date: 2025  Patient name: Mateus Mckeon  : 1985  MRN: [id number]  Referring provider: Dania Sher DO  Dx:   Encounter Diagnosis     ICD-10-CM    1. Cerebrovascular accident (CVA), unspecified mechanism (HCC)  I63.9       2. Other abnormalities of gait and mobility  R26.89       3. Status post fall  Z91.81                               POC expires Auth Status Total   Visits  Start date  Expiration date PT/OT + Visit Limit? Co-Insurance   2/10/25 Submitted to FD 25, AV pending pending pending BOMN Yes, $0 co-pay                                               Subjective: Patient reports     Objective: See treatment diary below  *FLUID RESTRICTION- do not offer water*     BP start of session: 124/73 mmHg (per OT)    Per [employer]: >180/110 mmHg at rest, or >250/115 mmHg with activity, need to stop     NMR/TE: Gait Belt and CGA/CS (1 min on/ 2 min off; progressed to 1 minute off mid session)  - Sidestepping, 0UE  - STS no UE  - Walk fwd/bwd, 0 UE  - FWD hurdles 6\"  0 UE  - LAT hurdles 6\" 0 UE  - Step-ups to medium river rocks, 0 UE  - Standing on foam pad with head turns  - Standing on foam pad with head nods  - Standing on foam pad w/ 5.5# tidal tank trunk rotations  - Standing on foam pad w/ 5.5# tidal tank chest presses    BP end of session 110/60 mmHg and c/o dizziness      Assessment: Patient tolerated treatment well today, able to participate for full duration despite lower BP readings. Pt did c/o increased dizziness by end of session and BP had dropped further (low for him); medical team is aware and making adjustments to medications. He shows improvements with stability on foam pad; continue to progress foam exercises. Patient would benefit from continued PT to improve balance, endurance, and reduce fall risk.     Plan: Continue per plan of care.          HEP: walking on treadmill at home and exercises below    Access Code: [email or web address]  URL: " [email or web address]  Date: 01/21/2025  Prepared by: Marge Gilbert PT, DPT     Exercises  - Sit to Stand with Armchair  - 1 x daily - 7 x weekly - 3 sets - 10 reps  - Seated Long Arc Quad  - 1 x daily - 7 x weekly - 3 sets - 10 reps  - Seated March  - 1 x daily - 7 x weekly - 3 sets - 10 reps    Outcome Measures Initial Eval  1/30/24 PN/DC  9/4/2024 Initial Evaluation  11/18/24 PN  12/24/2024 Re-eval  1/16/2025 Daily Note  1/21/2025   5xSTS 31.25 sec 13.67 sec   no UE defer 16.33 sec,   no UE 21.03 sec, no UE     TUG 16.3 sec with rollator    23.6 sec no AD 11.48 sec   no AD 18.91 sec w/ rollator     17.16 sec no AD 12.18 sec   no AD 15.72 sec, no AD    10 meter  1.11 m/s self selected pace 0.84 m/s self selected pace  1.05 m/s self selected pace Next time> 0.74 m/s    MEDRANO  49/56 32/56 40/56 40/56    FGA 12/30 15/30 defer 12/30 Next time> 11/30   6MWT 900 ft 955 ft no AD defer 825 ft no AD Next time> 700 ft no AD   mCTSIB  FTEO firm  FTEC firm  FTEO foam  FTEC foam   30 sec  30 sec  30 sec  Unable defer 30 sec  30 sec  30 sec  2 sec 30 sec  30 sec  16 sec  2 sec    ABC  66.88% 62.5% 57.5% 49.38%

## 2025-01-30 NOTE — BH TREATMENT PLAN
"TREATMENT PLAN (Medication Management Only)        [facility]      Name and Date of Birth:  Mateus Mckeon 39 y.o. 1985 MRN: [id number]    Date of Visit: January 30, 2025    Diagnosis/Diagnoses:    1. Generalized anxiety disorder        2. Moderate episode of recurrent major depressive disorder (HCC)        3. Psychophysiological insomnia            Strengths/Personal Resources for Self-Care: supportive family, taking medications as prescribed, ability to adapt to life changes, ability to communicate needs, ability to communicate well, ability to listen, ability to reason, ability to understand psychiatric illness, good understanding of illness, motivation for treatment, ability to negotiate basic needs, being resoureceful, self-reliance.    Area/Areas of need (in own words): depressive symptoms    1. Long Term Goal: improve control of depression.  Target Date:6 months - 7/30/2025  Person/Persons responsible for completion of goal: Mateus    2.  Short Term Objective (s) - How will we reach this goal?:   A. Provider new recommended medication/dosage changes and/or continue medication(s): continue current medications as prescribed.  B. Keep regularly scheduled psychiatric appointments  C. Maintain adherence to psychotropic medication regimen   D. Exercise regularly via OT/PT  E. Maintain appropriate dietary intake  F. Practice adequate sleep hygiene    G. \"Be less of a burden to my family, contribute more\"  H. Start watching kids when I feel more stable    Target Date:6 months - 7/30/2025  Person/Persons Responsible for Completion of Goal: Mateus    Progress Towards Goals: continuing treatment    Treatment Modality: medication management every 3 months    Review due 180 days from date of this plan: 6 months - 7/30/2025  Expected length of service: ongoing treatment    My Physician/PA/NP and I have developed this plan together and I agree to work on the " goals and objectives. I understand the treatment goals that were developed for my treatment. Treatment Plan was completed with Mateus's assistance and input throughout today's session. Mateus consented to the information provided and documented above. Unfortunately, treatment plan was not physically signed at the time of this office visit secondary to time constraints and issues related to signature keypad. Again, Mateus did verbally consent.

## 2025-02-01 NOTE — ASSESSMENT & PLAN NOTE
Neuropsychology referral for evaluation of word finding difficulties to assess for mild cognitive impairments due to prior cerebrovascular disease

## 2025-02-01 NOTE — ASSESSMENT & PLAN NOTE
History of provoked seizures in the setting of hypertensive emergency  He had one seizure like event during his hospitalization in November with myoclonic jerking and an abnormal EEG study showing the presence of a single right temporal sharp wave in the setting of hypertensive emergency.  No clear independent predisposition to recurrent seizures.  Word finding difficulty could be a more chronic cognitive problem due to repeated cerebrovascular injury from prior strokes and episodes of hypertensive emergency.  - ambulatory EEG 24 hours  - if there are events that look like seizures, try to get a video recording

## 2025-02-03 ENCOUNTER — TELEPHONE (OUTPATIENT)
Dept: PSYCHIATRY | Facility: CLINIC | Age: 40
End: 2025-02-03

## 2025-02-03 NOTE — TELEPHONE ENCOUNTER
Upon review of the In Basket request we were able to locate, review, and update the patient chart as requested for Diabetic Eye Exam.    Any additional questions or concerns should be emailed to the Practice Liaisons via the appropriate education email address, please do not reply via In Basket.    Thank you  Souleymane Nazario MA   [facility]

## 2025-02-03 NOTE — TELEPHONE ENCOUNTER
Called and left message for patient to return a call to [phone] and schedule 4 month follow up with provider (Dr Bermeo). Please schedule upon return call. Thank you.

## 2025-02-04 ENCOUNTER — OFFICE VISIT (OUTPATIENT)
Facility: CLINIC | Age: 40
End: 2025-02-04
Payer: MEDICARE

## 2025-02-04 ENCOUNTER — APPOINTMENT (OUTPATIENT)
Facility: CLINIC | Age: 40
End: 2025-02-04
Payer: MEDICARE

## 2025-02-04 DIAGNOSIS — N18.6 END STAGE RENAL DISEASE ON DIALYSIS (HCC): ICD-10-CM

## 2025-02-04 DIAGNOSIS — R26.89 OTHER ABNORMALITIES OF GAIT AND MOBILITY: ICD-10-CM

## 2025-02-04 DIAGNOSIS — I63.9 CEREBROVASCULAR ACCIDENT (CVA), UNSPECIFIED MECHANISM (HCC): Primary | ICD-10-CM

## 2025-02-04 DIAGNOSIS — Z91.81 STATUS POST FALL: ICD-10-CM

## 2025-02-04 DIAGNOSIS — R53.1 GENERALIZED WEAKNESS: ICD-10-CM

## 2025-02-04 DIAGNOSIS — Z99.2 END STAGE RENAL DISEASE ON DIALYSIS (HCC): ICD-10-CM

## 2025-02-04 DIAGNOSIS — I63.89 CEREBROVASCULAR ACCIDENT (CVA) DUE TO OTHER MECHANISM (HCC): Primary | ICD-10-CM

## 2025-02-04 PROCEDURE — 97112 NEUROMUSCULAR REEDUCATION: CPT

## 2025-02-04 PROCEDURE — 97110 THERAPEUTIC EXERCISES: CPT

## 2025-02-04 PROCEDURE — 97112 NEUROMUSCULAR REEDUCATION: CPT | Performed by: PHYSICAL THERAPIST

## 2025-02-04 NOTE — PROGRESS NOTES
"Daily Note     Today's date: 2025  Patient name: Mateus Mckeon  : 1985  MRN: [id number]  Referring provider: Dania Sher DO  Dx:   Encounter Diagnosis     ICD-10-CM    1. Cerebrovascular accident (CVA), unspecified mechanism (HCC)  I63.9       2. Other abnormalities of gait and mobility  R26.89       3. Status post fall  Z91.81                                 POC expires Auth Status Total   Visits  Start date  Expiration date PT/OT + Visit Limit? Co-Insurance   2/10/25 Submitted to FD 25, AV pending pending pending BOMN Yes, $0 co-pay                                               Subjective: Patient reports     Objective: See treatment diary below  *FLUID RESTRICTION- do not offer water*     BP start of session: 124/73 mmHg (per OT)    Per [employer] PT: >180/110 mmHg at rest, or >250/115 mmHg with activity, need to stop     NMR/TE: Gait Belt and CGA/CS (1 min on/ 2 min off; progressed to 1 minute off mid session)  - Sidestepping, 0UE  - STS no UE  - Walk fwd/bwd, 0 UE  - FWD hurdles 6\"  0 UE  - LAT hurdles 6\" 0 UE  - Step-ups to medium river rocks, 0 UE  - Standing on foam pad with head turns  - Standing on foam pad with head nods  - Standing on foam pad w/ 5.5# tidal tank trunk rotations  - Standing on foam pad w/ 5.5# tidal tank chest presses    BP end of session 110/60 mmHg and c/o dizziness      Assessment: Patient tolerated treatment well today, able to participate for full duration despite lower BP readings. Pt did c/o increased dizziness by end of session and BP had dropped further (low for him); medical team is aware and making adjustments to medications. He shows improvements with stability on foam pad; continue to progress foam exercises. Patient would benefit from continued PT to improve balance, endurance, and reduce fall risk.     Plan: Continue per plan of care.          HEP: walking on treadmill at home and exercises below    Access Code: [email or web address]  URL: " [email or web address]  Date: 01/21/2025  Prepared by: Marge Gilbert PT, DPT     Exercises  - Sit to Stand with Armchair  - 1 x daily - 7 x weekly - 3 sets - 10 reps  - Seated Long Arc Quad  - 1 x daily - 7 x weekly - 3 sets - 10 reps  - Seated March  - 1 x daily - 7 x weekly - 3 sets - 10 reps    Outcome Measures Initial Eval  1/30/24 PN/DC  9/4/2024 Initial Evaluation  11/18/24 PN  12/24/2024 Re-eval  1/16/2025 Daily Note  1/21/2025   5xSTS 31.25 sec 13.67 sec   no UE defer 16.33 sec,   no UE 21.03 sec, no UE     TUG 16.3 sec with rollator    23.6 sec no AD 11.48 sec   no AD 18.91 sec w/ rollator     17.16 sec no AD 12.18 sec   no AD 15.72 sec, no AD    10 meter  1.11 m/s self selected pace 0.84 m/s self selected pace  1.05 m/s self selected pace Next time> 0.74 m/s    MEDRANO  49/56 32/56 40/56 40/56    FGA 12/30 15/30 defer 12/30 Next time> 11/30   6MWT 900 ft 955 ft no AD defer 825 ft no AD Next time> 700 ft no AD   mCTSIB  FTEO firm  FTEC firm  FTEO foam  FTEC foam   30 sec  30 sec  30 sec  Unable defer 30 sec  30 sec  30 sec  2 sec 30 sec  30 sec  16 sec  2 sec    ABC  66.88% 62.5% 57.5% 49.38%

## 2025-02-04 NOTE — PROGRESS NOTES
OT Daily Note     Today's date: 2025  Patient name: Mateus Mckeon  : 1985  MRN: [id number]  Referring provider: Dania Sher DO  Dx:   Encounter Diagnosis     ICD-10-CM    1. Cerebrovascular accident (CVA) due to other mechanism (HCC)  I63.89       2. Generalized weakness  R53.1       3. End stage renal disease on dialysis (HCC)  N18.6     Z99.2         Start Time: 801  Stop Time: 845  Total time in clinic (min): 44 minutes    PLAN OF CARE START: 2025  PLAN OF CARE END: 2025  FREQUENCY: 2x/wk  PRECAUTIONS: Renal failure, actively going through dialysis; type 1 diabetes; HTN; SAH; seizure (last one was )    Subjective:     Objective: See treatment diary below    NMR:  Pt picks up marbles with blue clothes pin using three point pinch and placed into target container. Frequent rest breaks due to fatigue.   Bead and string activity with pt picking up two beads at a time to string. Working on FMC, dexterity, bimanual skills.   Completed lacing activity with the R hand to work on dexterity, FMC    TE  Green flex bar bends 30x downward  Green flex bar bends 30x upward,   Green flex bar twists, 30x each direction    Assessment: Pt tolerated session well today. Focused on FMC, dexterity and bimanual skills today. Poor endurance continues to impact tolerance to activities and required rest breaks throughout. Pt would benefit from continued skilled occupational therapy services to improve fine motor coordination, dexterity, strength, UE function, and functional cognition.    Plan: Continue per plan of care.       SHORT TERM GOALS ADDED 24:  Pt will increase sustained attention by completing trail making part A in 35 seconds to complete IADLs NOT MET  Pt will increase divided attention by completing trail making part B in 1 minute 30 seconds to complete IADLs NOT MET  Pt will increase delayed recall and recall 4 /5 items on MOCA to complete IADLs GOAL MET    LONG TERM GOALS ADDED  2/20/24:  Pt will increase sustained attention by completing trail making part A in 38 seconds to complete IADLs  Pt will increase divided attention by completing trail making part B in 1 minute 10 seconds to complete IADLs  Pt will increase delayed recall and recall 5/5 items on MOCA to complete IADLs  Resume right  hand dominance to refined functional assist with all activities of daily living]

## 2025-02-04 NOTE — PROGRESS NOTES
"Daily Note     Today's date: 2025  Patient name: Mateus Mckeon  : 1985  MRN: [id number]  Referring provider: Dania Sher DO  Dx:   Encounter Diagnosis     ICD-10-CM    1. Cerebrovascular accident (CVA), unspecified mechanism (HCC)  I63.9       2. Other abnormalities of gait and mobility  R26.89       3. Status post fall  Z91.81                                 POC expires Auth Status Total   Visits  Start date  Expiration date PT/OT + Visit Limit? Co-Insurance   2/10/25 Submitted to FD 25, AV pending pending pending BOMN Yes, $0 co-pay                                               Subjective: Patient reports feeling dizzy, but better than last week.     Objective: See treatment diary below  *FLUID RESTRICTION- do not offer water*     BP start of session:     Per [employer]: >180/110 mmHg at rest, or >250/115 mmHg with activity, need to stop     NMR/TE: Gait Belt and CGA/CS (1 min on/ 2 min off; progressed to 1 minute off mid session)  - Sidestepping, 0UE  - STS no UE  - Walk fwd/bwd, 0 UE (2# ankle weights added)  - LAT hurdles 6\" 0 UE (2# ankle weights)  - FWD hurdles 6\"  0 UE (2# ankle weights) x 2 min  - Step-ups to medium river rocks, 0 UE, x 2 min  - Standing on foam pad with head turns x 2 min  - Standing on foam pad with head nods x 2 min  - Standing on foam pad w/ 5.5# tidal tank trunk rotations x 2 min    Assessment: Patient tolerated treatment well today. Progressed to 2 min intervals and added 2# ankle weights, which he tolerated well without c/o fatigue. Also progressed to reciprocal stepping over 6\" hurdles. He demonstrates increased instability with vertical head nods on foam pad. Patient would benefit from continued PT to improve balance, endurance, and reduce fall risk.     Plan: Continue per plan of care.          HEP: walking on treadmill at home and exercises below    Access Code: [email or web address]  URL: [email or web address]  Date: " 01/21/2025  Prepared by: Marge Gilbert PT, DPT     Exercises  - Sit to Stand with Armchair  - 1 x daily - 7 x weekly - 3 sets - 10 reps  - Seated Long Arc Quad  - 1 x daily - 7 x weekly - 3 sets - 10 reps  - Seated March  - 1 x daily - 7 x weekly - 3 sets - 10 reps    Outcome Measures Initial Eval  1/30/24 PN/DC  9/4/2024 Initial Evaluation  11/18/24 PN  12/24/2024 Re-eval  1/16/2025 Daily Note  1/21/2025   5xSTS 31.25 sec 13.67 sec   no UE defer 16.33 sec,   no UE 21.03 sec, no UE     TUG 16.3 sec with rollator    23.6 sec no AD 11.48 sec   no AD 18.91 sec w/ rollator     17.16 sec no AD 12.18 sec   no AD 15.72 sec, no AD    10 meter  1.11 m/s self selected pace 0.84 m/s self selected pace  1.05 m/s self selected pace Next time> 0.74 m/s    MEDRANO  49/56 32/56 40/56 40/56    FGA 12/30 15/30 defer 12/30 Next time> 11/30   6MWT 900 ft 955 ft no AD defer 825 ft no AD Next time> 700 ft no AD   mCTSIB  FTEO firm  FTEC firm  FTEO foam  FTEC foam   30 sec  30 sec  30 sec  Unable defer 30 sec  30 sec  30 sec  2 sec 30 sec  30 sec  16 sec  2 sec    ABC  66.88% 62.5% 57.5% 49.38%

## 2025-02-09 PROBLEM — J11.1 INFLUENZA: Status: RESOLVED | Noted: 2025-01-04 | Resolved: 2025-02-09

## 2025-02-10 NOTE — TELEPHONE ENCOUNTER
Patient's parent/guardian contacted the office to schedule a follow up visit with provider. Patient is now scheduled for 5/1/2025  at 8am virtually.

## 2025-02-11 ENCOUNTER — OFFICE VISIT (OUTPATIENT)
Facility: CLINIC | Age: 40
End: 2025-02-11
Payer: MEDICARE

## 2025-02-11 ENCOUNTER — TELEPHONE (OUTPATIENT)
Age: 40
End: 2025-02-11

## 2025-02-11 DIAGNOSIS — R53.1 GENERALIZED WEAKNESS: ICD-10-CM

## 2025-02-11 DIAGNOSIS — Z99.2 END STAGE RENAL DISEASE ON DIALYSIS (HCC): ICD-10-CM

## 2025-02-11 DIAGNOSIS — I63.9 CEREBROVASCULAR ACCIDENT (CVA), UNSPECIFIED MECHANISM (HCC): Primary | ICD-10-CM

## 2025-02-11 DIAGNOSIS — Z91.81 STATUS POST FALL: ICD-10-CM

## 2025-02-11 DIAGNOSIS — I63.89 CEREBROVASCULAR ACCIDENT (CVA) DUE TO OTHER MECHANISM (HCC): Primary | ICD-10-CM

## 2025-02-11 DIAGNOSIS — R26.89 OTHER ABNORMALITIES OF GAIT AND MOBILITY: ICD-10-CM

## 2025-02-11 DIAGNOSIS — N18.6 END STAGE RENAL DISEASE ON DIALYSIS (HCC): ICD-10-CM

## 2025-02-11 PROCEDURE — 97112 NEUROMUSCULAR REEDUCATION: CPT | Performed by: PHYSICAL THERAPIST

## 2025-02-11 PROCEDURE — 97112 NEUROMUSCULAR REEDUCATION: CPT | Performed by: OCCUPATIONAL THERAPIST

## 2025-02-11 NOTE — PROGRESS NOTES
OT Daily Note     Today's date: 2025  Patient name: Mateus Mckeon  : 1985  MRN: [id number]  Referring provider: Dania Sher DO  Dx:   Encounter Diagnosis     ICD-10-CM    1. Cerebrovascular accident (CVA) due to other mechanism (HCC)  I63.89       2. Generalized weakness  R53.1       3. End stage renal disease on dialysis (HCC)  N18.6     Z99.2                    PLAN OF CARE START: 2025  PLAN OF CARE END: 2025  FREQUENCY: 2x/wk  PRECAUTIONS: Renal failure, actively going through dialysis; type 1 diabetes; HTN; SAH; seizure (last one was 2019)    Subjective: Pt reports he's still recuperating from being in the hospital.    Objective: See treatment diary below    NMR:  Wooden sudoku setup and solve with 1lb wrist weight donned, pt picking up multiple tiles at a time, focusing on R hand FMC and dexterity, proximal strength/muscular endurance. Required whole session to complete. Divided attention component with engaging in light conversation.     Assessment: Pt tolerated session well today. Focused on FMC, dexterity and sustained/divided attention. One short rest break required half way through activity. Pt would benefit from continued skilled occupational therapy services to improve fine motor coordination, dexterity, strength, UE function, and functional cognition.    Plan: Continue per plan of care.       SHORT TERM GOALS ADDED 24:  Pt will increase sustained attention by completing trail making part A in 35 seconds to complete IADLs NOT MET  Pt will increase divided attention by completing trail making part B in 1 minute 30 seconds to complete IADLs NOT MET  Pt will increase delayed recall and recall 4 /5 items on MOCA to complete IADLs GOAL MET    LONG TERM GOALS ADDED 24:  Pt will increase sustained attention by completing trail making part A in 38 seconds to complete IADLs  Pt will increase divided attention by completing trail making part B in 1 minute 10 seconds  to complete IADLs  Pt will increase delayed recall and recall 5/5 items on MOCA to complete IADLs  Resume right  hand dominance to refined functional assist with all activities of daily living]

## 2025-02-11 NOTE — PROGRESS NOTES
"Daily Note     Today's date: 2025  Patient name: Mateus Mckeon  : 1985  MRN: [id number]  Referring provider: Dania Sher DO  Dx:   Encounter Diagnosis     ICD-10-CM    1. Cerebrovascular accident (CVA), unspecified mechanism (HCC)  I63.9       2. Other abnormalities of gait and mobility  R26.89       3. Status post fall  Z91.81                                   POC expires Auth Status Total   Visits  Start date  Expiration date PT/OT + Visit Limit? Co-Insurance   2/10/25 Submitted to FD 25, AV pending pending pending BOMN Yes, $0 co-pay                                               Subjective: Patient reports feeling dizzy.    Objective: See treatment diary below  *FLUID RESTRICTION- do not offer water*     BP start of session: 120/60 mmHg    Per [employer]: >180/110 mmHg at rest, or >250/115 mmHg with activity, need to stop     NMR/TE: Gait Belt and CGA/CS (2 min on/ 1 min off)   2# ankle weights   - Sidestepping, 0UE  - Walk fwd/bwd while holding small tidal tank, 0 UE   - LAT hurdles 6\" 0 UE   - FWD hurdles 6-9\"  0 UE  - Step-ups to medium river rocks, 0 UE  - Seated LAQ 3x10 (review of HEP)    BP end of session: 115/60 mmHg  Session ended early due to dizziness    Assessment: Patient tolerated treatment fair today. Able to complete some of his standing exercises but then requested to switch to seated due to ongoing dizziness. He did tolerate 2 min of standing exercises and only 1 min rest breaks today. Systolic BP decreased from 120 to 115 mmHg, which is quite low for his baseline. Walked pt out to lobby with no issues, and pt waiting in lobby for family. Patient would benefit from continued PT to improve balance, endurance, and reduce fall risk.     Plan: Continue per plan of care.          HEP: walking on treadmill at home and exercises below    Access Code: [email or web address]  URL: [email or web address]  Date: 2025  Prepared by: Georgia March PT, DPT   "   Exercises  - Sit to Stand with Armchair  - 1 x daily - 7 x weekly - 3 sets - 10 reps  - Seated Long Arc Quad  - 1 x daily - 7 x weekly - 3 sets - 10 reps  - Seated March  - 1 x daily - 7 x weekly - 3 sets - 10 reps    Outcome Measures Initial Eval  1/30/24 PN/DC  9/4/2024 Initial Evaluation  11/18/24 PN  12/24/2024 Re-eval  1/16/2025 Daily Note  1/21/2025   5xSTS 31.25 sec 13.67 sec   no UE defer 16.33 sec,   no UE 21.03 sec, no UE     TUG 16.3 sec with rollator    23.6 sec no AD 11.48 sec   no AD 18.91 sec w/ rollator     17.16 sec no AD 12.18 sec   no AD 15.72 sec, no AD    10 meter  1.11 m/s self selected pace 0.84 m/s self selected pace  1.05 m/s self selected pace Next time> 0.74 m/s    MEDRANO  49/56 32/56 40/56 40/56    FGA 12/30 15/30 defer 12/30 Next time> 11/30   6MWT 900 ft 955 ft no AD defer 825 ft no AD Next time> 700 ft no AD   mCTSIB  FTEO firm  FTEC firm  FTEO foam  FTEC foam   30 sec  30 sec  30 sec  Unable defer 30 sec  30 sec  30 sec  2 sec 30 sec  30 sec  16 sec  2 sec    ABC  66.88% 62.5% 57.5% 49.38%

## 2025-02-11 NOTE — TELEPHONE ENCOUNTER
sent referral to psychology practice prior auth. and referral specialist for review and closed in OP WQ

## 2025-02-12 ENCOUNTER — TELEPHONE (OUTPATIENT)
Age: 40
End: 2025-02-12

## 2025-02-12 NOTE — TELEPHONE ENCOUNTER
Writer spoke breifly to patient. Patient was about to go into an appointment. Writer will attempt to call patient tomorrow.    Writer is going to close referral and will send resources to patient after speaking to him.

## 2025-02-18 ENCOUNTER — OFFICE VISIT (OUTPATIENT)
Facility: CLINIC | Age: 40
End: 2025-02-18
Payer: MEDICARE

## 2025-02-18 ENCOUNTER — EVALUATION (OUTPATIENT)
Facility: CLINIC | Age: 40
End: 2025-02-18
Payer: MEDICARE

## 2025-02-18 DIAGNOSIS — Z99.2 END STAGE RENAL DISEASE ON DIALYSIS (HCC): ICD-10-CM

## 2025-02-18 DIAGNOSIS — I63.9 CEREBROVASCULAR ACCIDENT (CVA), UNSPECIFIED MECHANISM (HCC): Primary | ICD-10-CM

## 2025-02-18 DIAGNOSIS — R26.89 OTHER ABNORMALITIES OF GAIT AND MOBILITY: ICD-10-CM

## 2025-02-18 DIAGNOSIS — R53.1 GENERALIZED WEAKNESS: ICD-10-CM

## 2025-02-18 DIAGNOSIS — Z91.81 STATUS POST FALL: ICD-10-CM

## 2025-02-18 DIAGNOSIS — N18.6 END STAGE RENAL DISEASE ON DIALYSIS (HCC): ICD-10-CM

## 2025-02-18 DIAGNOSIS — I63.89 CEREBROVASCULAR ACCIDENT (CVA) DUE TO OTHER MECHANISM (HCC): Primary | ICD-10-CM

## 2025-02-18 PROCEDURE — 97530 THERAPEUTIC ACTIVITIES: CPT

## 2025-02-18 PROCEDURE — 97530 THERAPEUTIC ACTIVITIES: CPT | Performed by: PHYSICAL THERAPIST

## 2025-02-18 PROCEDURE — 97112 NEUROMUSCULAR REEDUCATION: CPT

## 2025-02-18 NOTE — PROGRESS NOTES
"OT Daily Note     Today's date: 2025  Patient name: Mateus Mckeon  : 1985  MRN: [id number]  Referring provider: Dania Sher DO  Dx:   Encounter Diagnosis     ICD-10-CM    1. Cerebrovascular accident (CVA) due to other mechanism (HCC)  I63.89       2. Generalized weakness  R53.1       3. End stage renal disease on dialysis (HCC)  N18.6     Z99.2           Start Time: 0800  Stop Time: 0845  Total time in clinic (min): 45 minutes    PLAN OF CARE START: 2025  PLAN OF CARE END: 2025  FREQUENCY: 2x/wk  PRECAUTIONS: Renal failure, actively going through dialysis; type 1 diabetes; HTN; SAH; seizure (last one was )    Subjective: Pt reports he got a dog last week, \"its keeping me busy\"    Objective: See treatment diary below    NMR:  Nustep level 4 x6 min completed to work on UE endurance, provide proprioceptive input, and improve proximal strengthening  Juxacizor with 2lb wrist weight donned to work on motor control, coordination, and UE endurance, 2 x2min     TA:  - pushing and twisting cone into pink theraputty to work on wrist strength, sustained grasp, and UE endurance   - pushing and twisting small peg into putty to work on tripod grasp, FMC  - suncatchers activity completed to work on R hand FMC, dexterity, and sustained attention to task, upgraded to translation with stabilization of 5-6 beads at a time with about 3-4 drops throughout the activity    Assessment: Pt tolerated session well today. Improved endurance noted during today's session with less rest breaks required. Pt would benefit from continued skilled occupational therapy services to improve fine motor coordination, dexterity, strength, UE function, and functional cognition.    Plan: Continue per plan of care.       SHORT TERM GOALS ADDED 24:  Pt will increase sustained attention by completing trail making part A in 35 seconds to complete IADLs NOT MET  Pt will increase divided attention by completing trail " making part B in 1 minute 30 seconds to complete IADLs NOT MET  Pt will increase delayed recall and recall 4 /5 items on MOCA to complete IADLs GOAL MET    LONG TERM GOALS ADDED 2/20/24:  Pt will increase sustained attention by completing trail making part A in 38 seconds to complete IADLs  Pt will increase divided attention by completing trail making part B in 1 minute 10 seconds to complete IADLs  Pt will increase delayed recall and recall 5/5 items on MOCA to complete IADLs  Resume right  hand dominance to refined functional assist with all activities of daily living]

## 2025-02-18 NOTE — PROGRESS NOTES
PT Re-Evaluation             POC expires Auth Status Total   Visits  Start date  Expiration date PT/OT + Visit Limit? Co-Insurance   2/10/25 Submitted to FD 25, AV pending pending pending SANTI Yes, $0 co-pay                                                                               Today's date: 2025  Patient name: Mateus Mckeon  : 1985  MRN: [id number]  Referring provider: Dania Sher DO  Dx:   Encounter Diagnosis     ICD-10-CM    1. Cerebrovascular accident (CVA), unspecified mechanism (HCC)  I63.9       2. Other abnormalities of gait and mobility  R26.89       3. Status post fall  Z91.81                     Assessment  Assessment details: Patient is a 39 y.o. Male who is familiar to the balance center presents back following self-discharge after 24. Past medical history significant for ESRD on dialysis, and CVA's (most recent one in 2024). He has attended PT inconsistently from November- December and most recently was hospitalized twice in January due to hypertensive emergency and flu. He returned for re-evaluation  and has been attending PT 2x/week consistently since then. He presents today for re-evaluation and demonstrates significant improvements in outcome measures below (met MDC for all measures except Callejas). He has met 4 of 5 short-term goals, and met 3 of 7 long-term goals thus far. He is now considered low fall risk for 5x STS, gait speed, and TUG which is a significant improvement from last month. He remains high fall risk for Callejas, FGA, and ABC. He met MDC for 6MWT indicating improved endurance. He did demonstrate regression in the past when he stopped attending PT, and now after attending for 1 month consistently his scores are already improving and he is transitioning from high fall risk to low fall risk for 3 outcome measures. Strongly advise continued PT to continue to make  improvements and work towards meeting all goals. Continued recommendations are for pt to use treadmill at home for HEP and to perform seated LE strengthening exercises. PT will continue to focus on dynamic balance. Patient will continue to benefit from skilled OPPT services in order to maximize safe functional mobility and reduce overall risk for falls.       Impairments: Abnormal coordination, Abnormal gait, Abnormal muscle tone, Abnormal or restricted ROM, Activity intolerance, Impaired balance, Impaired physical strength, Lacks appropriate HEP, Poor posture, Poor body mechanics, Pain with function, Safety issue, Weight-bearing intolerance, Abnormal movement, Difficulty understanding, Abnormal muscle firing  Understanding of Dx/Px/POC: Good  Prognosis: Good    Patient verbalized understanding of POC.    Please contact me if you have any questions or recommendations. Thank you for the referral and the opportunity to share in Mateus CLOTILDE Mckeon's care.      Plan  Plan details: PT 2-3x/week   Patient would benefit from: Skilled PT  Planned modality interventions: Biofeedback, Cryotherapy, TENS, Thermotherapy  Planned therapy interventions: Abdominal trunk stabilization, ADL training, Balance, Balance/WB training, Breathing training, Body mechanics training, Coordination, Functional ROM exercises, Gait training, HEP, Joint Mobilization, Manual Therapy, Ma taping, Motor coordination training, Neuromuscular re-education, Patient education, Postural training, Strengthening, Stretching, Therapeutic activities, Therapeutic exercises, Therapeutic training, Transfer training, Activity modification, Work reintegration  Frequency: 2-3x/wk  Duration in weeks: 12 weeks  Plan of Care beginning date: 2/10/25  Plan of Care expiration date: 12 weeks - 5/5/25  Treatment plan discussed with: Patient         Goals  Short Term Goals (4 weeks):    -Patient will complete 6 MWT to assess cardiovascular endurance and improve  tolerance to activity - MET  -Patient will be independent with HEP within 4 weeks - MET  -Patient will be able to ambulate household distances (100ft) or greater with LRAD within 4 week - MET  - Patient will improve 5xSTS score by 2.3 seconds to promote improved LE functional strength needed for ADLs - MET  -Patient will improve ABC to 80% to demonstrate increased balance confidence and reduced fall risk - NOT MET    Long Term Goals (12 weeks):  - Patient will be independent in a comprehensive home exercise program- NOT MET  - Patient will improve gait speed to 1.0 m/s to improve safety with community ambulation - MET  - Patient will improve MEDRANO by 6 points to facilitate return to safe independent ambulation- NOT MET  - Patient will complete DGI to assess dynamic balance and safety with ambulation without AD- NOT MET (performed FGA instead)  - Patient will improve 6 Minute Walk Test score by 190 feet to promote improved cardiovascular endurance- MET  - Patient will report going on walks at least 3 days per week to promote independence and improved cardiovascular endurance- NOT MET  - Patient will report ~75% improvement in falls or near falls to promote safety in his community and independence in his household- MET      Cut off score    All date taken from [facility] Neuro Section or Rehab Measures      [place]:  Siva et al., 2018  MDC: 2.7 pts    She et al., 2011  Cut-off score: 45/56    Chronic CVA  < 44/56 high risk for falls (Siva et al., 2018)  < 47.5/56 slow walker status (Jus et al., 2011) 5xSTS: Jean Carlos 2010  MDC: 2.3 sec  Age Norms:  60-69: 11.1 sec  70-79: 12.6 sec  80-89: 14.8 sec    Yamileth 2010, Chronic Stroke  Chronic CVA: 12 sec   TUG  Nubia rodríguez al., 2005  MDC: 2.9 sec    Cut off score:  >13.5 sec community dwelling adults  >32.2 frail elderly  <20 I for basic transfers  >30 dependent on transfers 10 Meter Walk Test:   Talia et al., 2006  Small meaningful change: 0.06 m/s  Substantial  "meaningful change: 0.14 m/s  MCID: 0.16 m/s    < 0.4 m/s household ambulators  0.4 - 0.8 m/s limited community ambulators  > 0.8 m/s community ambulators   FGA: Deepika et al., 2010  MCID: 4.2 pts  Geriatrics/community < 22/30 fall risk  Geriatrics/community < 20/30 unexplained falls DGI  MDC: vestibular - 4 pts  MDC: geriatric/community - 3 pts  Falls risk <19/24   6 Minute Walk Test  Talia et al., 2006  MDC: 60.98 m (200.01 ft)    Verónica Norwood, & Ally, 2012  MCID: 34.4 m    Age Norms  60-69: M - 1876 ft   F - 1765 ft  70-79: M - 1729 ft   F - 1545 ft  80-89: M - 1368 ft   F - 1286 ft Modified Joel  0: No increase in tone  1: Catch and release or min resistance at end range  1+: Catch f/b min resistance throughout remainder (< half ROM)  2: Easily moved, but more marked tone throughout most ROM  3: Significant tone, PROM difficult  4: Rigid   MiniBest: Glenda et al., 2013  CVA < 17.5 fall risk Pass (Acute CVA)  MDC: 1.8 points (acute), 3.2 points (chronic)         Subjective    History of Present Illness  Mechanism of injury: Patient is familiar to balance center as he participated with OT and PT services before self-discharging from PT after 09/06/24 secondary to \"feeling ready and past the point of needing PT services\". Patient past medical history significant for CVA (3rd one in January), ESRD which he goes to dialysis for 3x a week, frequent falls, and COVID. During his hiatus from PT patient had 2 falls and COVID with subsequent ~5 day hospitalization on 10/18/24. He had a second hospital visit ~2 weeks ago where he was exhibiting seizure like activity. As per patient, medical professionals did not provide rationale for seizure like activity as imaging returned negative. He was not placed on any new medication and was discharged home. Follow-up with neurology in January; denies any new seizure like activity. He ambulates with his rollator while out in his community and cruises on furniture at home despite " recommended use of cane or RW at home. Patient largest complaint is that he feels overtly fatigued as his tolerance to activity has vastly decreased, increased word finding difficulties, and decreased balance. His major goal is to improve tolerance and return to level he was prior to self discharge.     Updated (12/24/2024): Patient reports for reassessment after hiatus from PT services due to being sick with Covid. He feels his endurance has been worse since being sick.    Updated (1/16/25): Patient has been hospitalized twice within the past month (on 1/4 and 1/9) for hypertensive emergency, and also was diagnosed with the flu.He reports feeling weak since being discharged from hospital. He presents with SPC.     Updated (2/18/25): Pt reports BP's have been better, continues to get dizzy but it's bearable and more of his baseline now. He has eliminated some of his BP pills. Arrives with rollator today; contines to alternate between cane and rollator depending how he feels.     Primary AD: currently SPC and rollator depending how he is feeling.   (PLOF he mostly used SPC and only used rollator for long distances like going to nephew's soccer game)   Assist level at home: lives with parents who are assisting with ADL's and IADL's, but he is transferring and walking independently.   WC usage: none  PLOF: using SPC mostly, still required some assist from parents for ADL's and IADL's   Patient goals: to walk with SPC again, work on balance, build up walking endurance     Pain  Pain in both feet due to neuropathy     Social Support  Steps to enter house: 2 NIRU  Stairs in house: full flight to 2nd floor (able to perform independently)    Lives in: 2 story home   Lives with: parents    Employment status: disabled   Hand dominance: right    Treatments  Previous treatment: PT, OT  Current treatment: PT, OT  Diagnostic Testing:       Objective   LE MMT   - R Hip Flexion: 4/5  - L Hip Flexion: 4+/5  - R Hip Extension: 4+/5  -  L Hip Extension: 4+/5  - R Hip Abduction: 5/5  - L Hip abduction: 5/5  - R Hip adduction: 5/5  - L Hip adduction: 5/5  - R Knee Extension: 4+/5 - L Knee Extension: 4+/5  - R Knee Flexion: 4+/5  - L Knee Flexion: 4+/5  - R Ankle DF: 4/5  - L Ankle DF: 4/5  - R Ankle PF: 4+/5  - L Ankle PF: 4+/5    Sensation  - Light touch: neuropathy both feet up to below knee    Coordination  - Alternating Toe Taps: impaired  - Heel to shin: impaired   - Dysmetria: R side undershooting   - Dysdiadochokinesia: R slowed     Neglect  - R sided: No  - L sided: No    Gait  Wide COLBY, ataxic, decreased step length    Vitals:  /60 mmHg      Outcome Measures Initial Eval  1/30/24 PN/DC  9/4/2024 Initial Evaluation  11/18/24 PN  12/24/2024 Re-eval  1/16/2025 Re-eval  2/18/2025   5xSTS 31.25 sec 13.67 sec   no UE defer 16.33 sec,   no UE 21.03 sec, no UE  13.07 sec, no UE   TUG 16.3 sec with rollator    23.6 sec no AD 11.48 sec   no AD 18.91 sec w/ rollator     17.16 sec no AD 12.18 sec   no AD 15.72 sec, no AD 10.87 sec, no AD   10 meter  1.11 m/s self selected pace 0.84 m/s self selected pace  1.05 m/s self selected pace 0.74 m/s 1.14 m/s   MEDRANO  49/56 32/56 40/56 40/56 43/56   FGA 12/30 15/30 defer 12/30 11/30 16/30   6MWT 900 ft 955 ft no AD defer 825 ft no  ft, no  ft, no AD   mCTSIB  FTEO firm  FTEC firm  FTEO foam  FTEC foam   30 sec  30 sec  30 sec  Unable defer 30 sec  30 sec  30 sec  2 sec 30 sec  30 sec  16 sec  2 sec 30 sec  30 sec  17 sec  0 sec   ABC  66.88% 62.5% 57.5% 49.38% 60.63%        Precautions: Check BP's RUE only (fistula LUE), frequent falls  Past Medical History:   Diagnosis Date    Acute kidney injury (HCC)     Ambulates with cane     Anuria     Anxiety     Cellulitis of right elbow 03/31/2021    Chronic kidney disease     COVID-19 10/18/2024    Depression     Diabetes mellitus (HCC)     Diarrhea     Emesis 10/24/2020    End stage renal disease (HCC) 02/11/2018    Formatting of this note might be  different from the original. Last Assessment & Plan:  Secondary to DM.  On nightly PD.  Followed by Nephro.  Patient considering transplant for kidney and pancreas through [facility] Formatting of this note might be different from the original. Last Assessment & Plan:  Formatting of this note might be different from the original. 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    Esophagitis 07/21/2015    Falls     Gastroparesis     GERD (gastroesophageal reflux disease)     History of shingles 2010    History of transfusion 02/2018    no adverse reaction    Hyperlipidemia     Hyperphosphatemia     Hypertension     Hypoglycemia 07/15/2022    Itching     Mastoiditis of right side 07/15/2022    Muscle weakness     general unsteadiness    Obesity (BMI 30.0-34.9) 09/09/2019    Orthostatic hypotension 10/25/2020    Peripheral polyneuropathy 11/20/2019    PONV (postoperative nausea and vomiting) 01/26/2018    Protein-calorie malnutrition (HCC) 11/23/2020    Recurrent peritonitis (HCC) due to peritoneal dialysis catheter 07/31/2020    Retinopathy     Seizures (HCC)     early 2020 - one time    Skin abnormality     some dime size areas where skin was scratched from itching    Sleep apnea     Spontaneous bacterial peritonitis (HCC) 10/19/2020    Squamous cell skin cancer     left temple    Stroke (HCC)     x2 - off balance/no driving/fatigue    Swelling of both lower extremities     Swelling of joint of upper arm, right 04/03/2024    Traumatic onycholysis 07/21/2022    Vomiting     Wears glasses     Word finding difficulty 11/05/2024

## 2025-02-20 ENCOUNTER — OFFICE VISIT (OUTPATIENT)
Dept: PODIATRY | Facility: CLINIC | Age: 40
End: 2025-02-20
Payer: MEDICARE

## 2025-02-20 ENCOUNTER — EVALUATION (OUTPATIENT)
Facility: CLINIC | Age: 40
End: 2025-02-20
Payer: MEDICARE

## 2025-02-20 ENCOUNTER — OFFICE VISIT (OUTPATIENT)
Facility: CLINIC | Age: 40
End: 2025-02-20
Payer: MEDICARE

## 2025-02-20 VITALS — HEIGHT: 71 IN | BODY MASS INDEX: 27.86 KG/M2 | RESPIRATION RATE: 18 BRPM | WEIGHT: 199 LBS

## 2025-02-20 DIAGNOSIS — Z99.2 END STAGE RENAL DISEASE ON DIALYSIS (HCC): ICD-10-CM

## 2025-02-20 DIAGNOSIS — R53.1 GENERALIZED WEAKNESS: ICD-10-CM

## 2025-02-20 DIAGNOSIS — B35.1 ONYCHOMYCOSIS: ICD-10-CM

## 2025-02-20 DIAGNOSIS — N18.6 END STAGE RENAL DISEASE ON DIALYSIS (HCC): ICD-10-CM

## 2025-02-20 DIAGNOSIS — I63.89 CEREBROVASCULAR ACCIDENT (CVA) DUE TO OTHER MECHANISM (HCC): Primary | ICD-10-CM

## 2025-02-20 DIAGNOSIS — R26.89 OTHER ABNORMALITIES OF GAIT AND MOBILITY: ICD-10-CM

## 2025-02-20 DIAGNOSIS — E10.42 DIABETIC POLYNEUROPATHY ASSOCIATED WITH TYPE 1 DIABETES MELLITUS (HCC): Primary | ICD-10-CM

## 2025-02-20 DIAGNOSIS — I63.9 CEREBROVASCULAR ACCIDENT (CVA), UNSPECIFIED MECHANISM (HCC): Primary | ICD-10-CM

## 2025-02-20 PROCEDURE — RECHECK: Performed by: PODIATRIST

## 2025-02-20 PROCEDURE — 97112 NEUROMUSCULAR REEDUCATION: CPT

## 2025-02-20 PROCEDURE — 11721 DEBRIDE NAIL 6 OR MORE: CPT | Performed by: PODIATRIST

## 2025-02-20 PROCEDURE — 97530 THERAPEUTIC ACTIVITIES: CPT

## 2025-02-20 NOTE — PROGRESS NOTES
"Daily Note     Today's date: 2025  Patient name: Mateus Mckeon  : 1985  MRN: [id number]  Referring provider: Dania Sher DO  Dx:   Encounter Diagnosis     ICD-10-CM    1. Cerebrovascular accident (CVA), unspecified mechanism (HCC)  I63.9       2. Other abnormalities of gait and mobility  R26.89                                     POC expires Auth Status Total   Visits  Start date  Expiration date PT/OT + Visit Limit? Co-Insurance   2/10/25 Submitted to FD 25, AV pending pending pending BOMN Yes, $0 co-pay                                               Subjective: Patient presents to clinic with Rollator, no dizziness reported today.    Objective: See treatment diary below  *FLUID RESTRICTION- do not offer water*     BP start of session: 136/78 mmHg    Per [employer]: >180/110 mmHg at rest, or >250/115 mmHg with activity, need to stop     NMR/TE: Gait Belt and CGA/CS (2 min on/ 1 min off)   2.5# ankle weights   - Sidestepping, 0UE  - LAT hurdles 6\" 0 UE   - FWD hurdles 6-9\"  0 UE  - Step-ups to medium river rocks, 0 UE  - Walking facility dog and 5# TT other hand (tile + turf)  - Standing on foam pad with head turns x 2 min  - Standing on foam pad with head nods x 2 min  - Standing on foam pad w/ 5.5# tidal tank trunk rotations x 2 min (split up into 1 minute intervals with seated rest btwn)    BP end of session: 132/74 mmHg      Assessment: Patient tolerated treatment fair today. Increased ankle weights to 2.5# with good tolerance.Dizziness increased towards end of session with head/trunk movements performed on foam pad, able to recover with seated rest and vitals stable. One posterior LOB on river rocks but otherwise pt able to demonstrate adequate ankle and hip strategies to recover. No seated exercises needed today as he illustrated improved activity tolerance compared to last visit.  Patient would benefit from continued PT to improve balance, endurance, and reduce " fall risk.     Plan: Continue per plan of care.          HEP: walking on treadmill at home and exercises below        Outcome Measures Initial Eval  1/30/24 PN/DC  9/4/2024 Initial Evaluation  11/18/24 PN  12/24/2024 Re-eval  1/16/2025 Re-eval  2/18/2025   5xSTS 31.25 sec 13.67 sec   no UE defer 16.33 sec,   no UE 21.03 sec, no UE  13.07 sec, no UE   TUG 16.3 sec with rollator    23.6 sec no AD 11.48 sec   no AD 18.91 sec w/ rollator     17.16 sec no AD 12.18 sec   no AD 15.72 sec, no AD 10.87 sec, no AD   10 meter  1.11 m/s self selected pace 0.84 m/s self selected pace  1.05 m/s self selected pace 0.74 m/s 1.14 m/s   MEDRANO  49/56 32/56 40/56 40/56 43/56   FGA 12/30 15/30 defer 12/30 11/30 16/30   6MWT 900 ft 955 ft no AD defer 825 ft no  ft, no  ft, no AD   mCTSIB  FTEO firm  FTEC firm  FTEO foam  FTEC foam   30 sec  30 sec  30 sec  Unable defer 30 sec  30 sec  30 sec  2 sec 30 sec  30 sec  16 sec  2 sec 30 sec  30 sec  17 sec  0 sec   ABC  66.88% 62.5% 57.5% 49.38% 60.63%        Precautions: Check BP's RUE only (fistula LUE), frequent falls  Past Medical History:   Diagnosis Date    Acute kidney injury (HCC)     Ambulates with cane     Anuria     Anxiety     Cellulitis of right elbow 03/31/2021    Chronic kidney disease     COVID-19 10/18/2024    Depression     Diabetes mellitus (HCC)     Diarrhea     Emesis 10/24/2020    End stage renal disease (HCC) 02/11/2018    Formatting of this note might be different from the original. Last Assessment & Plan:  Secondary to DM.  On nightly PD.  Followed by Nephro.  Patient considering transplant for kidney and pancreas through [facility] Formatting of this note might be different from the original. Last Assessment & Plan:  Formatting of this note might be different from the original. 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    Esophagitis 07/21/2015    Falls     Gastroparesis     GERD (gastroesophageal reflux disease)      History of shingles 2010    History of transfusion 02/2018    no adverse reaction    Hyperlipidemia     Hyperphosphatemia     Hypertension     Hypoglycemia 07/15/2022    Itching     Mastoiditis of right side 07/15/2022    Muscle weakness     general unsteadiness    Obesity (BMI 30.0-34.9) 09/09/2019    Orthostatic hypotension 10/25/2020    Peripheral polyneuropathy 11/20/2019    PONV (postoperative nausea and vomiting) 01/26/2018    Protein-calorie malnutrition (HCC) 11/23/2020    Recurrent peritonitis (HCC) due to peritoneal dialysis catheter 07/31/2020    Retinopathy     Seizures (HCC)     early 2020 - one time    Skin abnormality     some dime size areas where skin was scratched from itching    Sleep apnea     Spontaneous bacterial peritonitis (HCC) 10/19/2020    Squamous cell skin cancer     left temple    Stroke (HCC)     x2 - off balance/no driving/fatigue    Swelling of both lower extremities     Swelling of joint of upper arm, right 04/03/2024    Traumatic onycholysis 07/21/2022    Vomiting     Wears glasses     Word finding difficulty 11/05/2024

## 2025-02-20 NOTE — PROGRESS NOTES
"OCCUPATIONAL THERAPY RE-EVALUATION    Today's Date: 2025  Patient Name: Mateus Mckeon  : 1985  MRN: [id number]  Referring Provider: Dania Sher DO  Dx: Cerebrovascular accident (CVA) due to other mechanism (HCC) [I63.89]      SKILLED ANALYSIS:  Pt presents to re-evaluation on 25 s/p ~1 year of OT treatment with a history of CVA and ESRD. Pt was more consistent with therapy over the past month and has patricia excellent improvement overall compared to his prior re-evaluation. Pt has met 4 more of his short term goals and 3 long term goals at this point. Pts UE function was evaluated thru FDT, 9 hole peg test, ANGEL, pinch meter, MMT, and functional cognition was measured through trail making A and B. PT FMC and dexterity made significant improvements compared to prior re-evaluation.  strength has improved by 7 lbs, and his tip and three point pinches each improved by 1 lb. Sustained and divided attention both improved based on the TM parts A and B assessments. Pt continues to function below normal limits in the following domains: R UE strength, endurance, R FMC, R dexterity, R  strength, attention, problem solving, and functional cognition. Recommend continued participation in OP OT 2x/wk for an additional 4-6 weeks with continued focus on aforementioned deficits to maximize functional performance and QOL.  Discussed progress and recommendations with pt and he is in agreement.    Subjective:   : Pt overall reports he is doing okay at this point, but states it is difficult for him to notice improvements in himself.     : \"I feel so weak since the hospital, they didn't let me get up. I am having a hard time focusing, and I think my dexterity has gotten a lot worse.... my balance and coordination is off\" Pts sugar alarm went of on his phone indicating low blood sugar, was provided a fruit bar during the session and this increased to WNL as per his report.    : \"I went to " "the hospital because I feel like I have long COVID, Lottie been so fatigue and sick. They did not give me answers.\"    5/23: Pt states he feels like his endurance is not where it needs to be. He has been attempting to use his hand as much as he can, however does not see much progress - if any thing he states \"I feel like I'm losing ground\". Pt states he did start using the theraputty and continued using foam to work on hand strength. Pt states he is not able to keep up with her exercises on days that he has dialysis due to fatigue.     7/11: Reports inc endurance during daily activities, but still fatigues quicker than baseline.  Able to complete zippers and buttons with inc time, requires assist for tying shoes.  Now able to write his initials and complete basic handwriting with ~80% legibility, significantly inc time and inc sizing    8/21: Pt states he is progressing, he feels like the use of his R hand has come a long way. Pt states he is better able to use his R hand, and is now able to tie his shoes.     9/25: Pt reports he feels like he is continuing to improve, however his R hand is still not where it was. Pt states he has been wearing his night resting hand splint less as he feels like his R hand is not as tight.     11/12: Pt states on 11/5 he sent to the ED as his BP was high and he was having difficulty forming sentences. As per EMR review, there was no acute infarct.     PLAN OF CARE START: 1/16/2025  PLAN OF CARE END: 4/16/2025  FREQUENCY: 2x/wk  PRECAUTIONS: Renal failure, actively going through dialysis; type 1 diabetes; HTN; SAH; seizure (last one was 2019)    Subjective    Mechanism of Injury  1/9-1/13 hospitilization from EMR: \"patient who originally presented to the hospital on 1/9/2025 due to severe blood pressure elevation in the setting of nausea and headache... Patient initially required cardene drip to control severe range blood pressures, and was therefore briefly under the care of the ICU " "\"    1/4-1/7 hospitalization from EMR: \"patient who originally presented to the hospital on 1/4/2025 due to high blood pressure.  In the ED with elevated BP to 230s/100.  Complain of headache and episode of vomiting, no neurologic deficit.\", influenza +    12/17/2024: From EMR: \"...presents with overall fatigue. Differential diagnosis includes but is not limited to gastroenteritis, COVID-19, influenza, pneumonia, electrolyte derangement, less likely ACS, less likely pericarditis/myocarditis given current absence of chest pain. Obtain laboratory analysis without acute pertinent findings. EKG as interpreted by myself as below. Chest x-ray without acute cardiopulmonary disease. POCUS obtained showing no pericardial effusion, and overall global function within normal limits.\"    11/12/24: \"Hypertensive emergency: Patient presented with blood pressure up to 200s, with word finding difficulty, with his history of CVAs, was admitted to ICU with Cardene drip and admitted to rule out stroke. Was treated with loading dose of aspirin and Plavix and maintained on daily dosing. Subsequently underwent CT head CTA which were negative. Underwent MRI brain which was negative for acute infarct. Hence Plavix can be discontinued at this time. Suspect all his symptoms which are secondary to high blood pressure temporarily affecting cerebral perfusion. He was weaned off Cardene drip and maintained on oral home medications yesterday in ICU and then downgraded as blood pressures were better controlled. According to the mother he gets symptomatic with SBP is below 120. Has BP of 140 is a good medium for him. It is unclear what acutely changes blood pressure hence his blood pressure was well-controlled for long period of time with the home regimen of medications. Only thing new was calcium and zinc supplementation because he was recently diagnosed with COVID 3 weeks ago\"    Paulo is a 38-year-old male with history of end-stage renal disease " "on dialysis, hypertension, type 1 diabetes, and recent subarachnoid hemorrhage. Pt reports he went to the hospital and they found a brain bleed and reported he needed an angioplasty. Unfortunately, during the angioplasty he suffered a stroke that affected his R UE. Since his stroke he reports his R UE function has improved although he continues with R hand FMC/dexterity deficits. Overall, pt requires assistance with 75% of his daily activities due to complex medical presentation as well as increased fatigue post-stroke.   \"There's nothing more like purgatory than using your non dominant side.\"    From Hospital note on 1/14/24:   38-year-old man with prior history of cerebellar and pontine strokes, prothrombin gene mutation, MTHFR gene mutation, diabetes, diabetic nephropathy with ESRD on HD, who presented with headache on 1/5 and was found to have subarachnoid hemorrhage in the basilar cisterns.  Unclear etiology.  Patient family reports nausea and retching around the time of headache.     - Underwent conventional angiogram later that day which was unremarkable for any clear source.  - MRI brain on 1/8 demonstrated multiple punctate foci of ischemia in the right MCA, bilateral cerebellar hemispheres, and right velia which may represent sequela of angiograph versus embolic strokes of unclear source (ESUS)   -Repeat angiogram on 1/12 was again unremarkable for source of subarachnoid.     Neurology consulted for right upper extremity weakness noted after second angiogram.  - Repeat MRI demonstrated numerous new embolic appearing foci of ischemia with similar suspected etiology as those mentioned above.     Transcranial Dopplers have been unremarkable with Lindegaard ratios consistently less than 3.     Spontaneous basilar cistern subarachnoid hemorrhage of yet unclear etiology.      Embolic appearing strokes, suspect complication of angiogram however cannot exclude additional embolic source.    Occupational Profile  Pt " "lives in a home with his parents; has one flight to bedroom and has AD throughout the house (grab bars etc.). Paulo reports he needs help with preparing meals (could get by making 1 meal for himself but not all day), laundry, and driving. He is independent with dressing and bathing, \"but it takes it out of me. I could do some things but I need help throughout the day.\"    It is of note that the pt is actively getting dialysis; has to sit still for 4 hours  Pt reports some numbness in fingers on left side (L sided forearm is where dialysis is put in) and notes difficulty with thinking and memory since stroke. No changes in vision     He enjoys spending time with nieces and nephews and would love to get a dog.    PATIENT GOAL: \"I want to be able to tie my shoes without having to ask my parents\"  2/20: over the next month states he would like to review more home program exercises in anticipation for d/c at the end of this POC.     HISTORY OF PRESENT ILLNESS:     PMH:   Past Medical History:   Diagnosis Date    Acute kidney injury (HCC)     Ambulates with cane     Anuria     Anxiety     Cellulitis of right elbow 03/31/2021    Chronic kidney disease     COVID-19 10/18/2024    Depression     Diabetes mellitus (HCC)     Diarrhea     Emesis 10/24/2020    End stage renal disease (HCC) 02/11/2018    Formatting of this note might be different from the original. Last Assessment & Plan:  Secondary to DM.  On nightly PD.  Followed by [facility].  [facility] Formatting of this note might be different from the original. Last Assessment & Plan:  Formatting of this note might be different from the original. 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    Esophagitis 07/21/2015    Falls     Gastroparesis     GERD (gastroesophageal reflux disease)     History of shingles 2010    History of transfusion 02/2018    no adverse reaction    " Hyperlipidemia     Hyperphosphatemia     Hypertension     Hypoglycemia 07/15/2022    Itching     Mastoiditis of right side 07/15/2022    Muscle weakness     general unsteadiness    Obesity (BMI 30.0-34.9) 09/09/2019    Orthostatic hypotension 10/25/2020    Peripheral polyneuropathy 11/20/2019    PONV (postoperative nausea and vomiting) 01/26/2018    Protein-calorie malnutrition (HCC) 11/23/2020    Recurrent peritonitis (HCC) due to peritoneal dialysis catheter 07/31/2020    Retinopathy     Seizures (HCC)     early 2020 - one time    Skin abnormality     some dime size areas where skin was scratched from itching    Sleep apnea     Spontaneous bacterial peritonitis (HCC) 10/19/2020    Squamous cell skin cancer     left temple    Stroke (HCC)     x2 - off balance/no driving/fatigue    Swelling of both lower extremities     Swelling of joint of upper arm, right 04/03/2024    Traumatic onycholysis 07/21/2022    Vomiting     Wears glasses     Word finding difficulty 11/05/2024       Past Surgical Hx:   Past Surgical History:   Procedure Laterality Date    CARDIAC ELECTROPHYSIOLOGY PROCEDURE N/A 9/21/2023    Procedure: Cardiac loop recorder explant;  Surgeon: Parish Morgan MD;  Location: [facility];  Service: Cardiology    CARDIAC LOOP RECORDER  05/2018    COLONOSCOPY      EGD      EYE SURGERY Right     HEMODIALYSIS ADULT  11/6/2024    IR AV FISTULAGRAM/GRAFTOGRAM  02/23/2021    IR CEREBRAL ANGIOGRAPHY  1/12/2024    IR CEREBRAL ANGIOGRAPHY / INTERVENTION  1/5/2024    IR TUNNELED CENTRAL LINE PLACEMENT  02/16/2021    IR TUNNELED DIALYSIS CATHETER PLACEMENT  11/18/2020    IR TUNNELED DIALYSIS CATHETER REMOVAL  02/12/2021    IR TUNNELED DIALYSIS CATHETER REMOVAL  03/11/2021    MOHS SURGERY Left 12/14/2022    Left temple with Dr. Hassan    PERITONEAL CATHETER INSERTION N/A 08/27/2018    Procedure: UNROOF PD CATHETER;  Surgeon: Felipe Lindo DO;  Location: [facility];  Service: General    NV ARTERIOVENOUS  ANASTOMOSIS OPEN DIRECT Left 11/09/2020    Procedure: CREATION FISTULA  ARTERIOVENOUS (AV) - LEFT WRIST;  Surgeon: Placido Altamirano MD;  Location: [facility];  Service: Vascular    AL ESOPHAGOGASTRODUODENOSCOPY TRANSORAL DIAGNOSTIC N/A 04/18/2019    Procedure: ESOPHAGOGASTRODUODENOSCOPY (EGD);  Surgeon: Ale Figueroa MD;  Location: [facility];  Service: Gastroenterology    AL LAPS INSERTION TUNNELED INTRAPERITONEAL CATHETER N/A 08/06/2018    Procedure: LAPAROSCOPIC PD CATHETER PLACEMENT;  Surgeon: Felipe Lindo DO;  [facility];  Service: General    AL REMOVAL TUNNELED INTRAPERITONEAL CATHETER N/A 11/18/2020    Procedure: REMOVAL CATHETER PERITONEAL DIALYSIS;  Surgeon: Abdifatah Ty MD;  Location[facility];  Service: General    TONSILLECTOMY      UPPER GASTROINTESTINAL ENDOSCOPY          Pain: FLACC 0     Objective    Upper Extremities:  Pt is right hand dominant    Range of Motion:  AROM:   R UE - not reassessed 1/16  - Shoulder flexion: 160* - WFL  -Shoulder abduction: 180* - WNL  - Shoulder extension: WNL  - Elbow flexion WNL  - Elbow extension: WNL  - Wrist flexion: WNL  - Wrist extension: WNL  - Pronation: WNL  - Supination: 95% AROM  - Finger extension: WNL  - Finger flexion: WNL    L UE : 100%     Manual Muscle Testing: NOT REASSESSED 1/16  R UE:  - Shoulder flexors: 5/5  - Shoulder extensors: 5/5  - Shoulder abductors: 5/5 Improved   - Shoulder external rotators: 5/5 Improved   - Shoulder internal rotators: 5/5  - Elbow flexors: 5/5  - Elbow extensors: 4+/5  - Wrist flexors: 5/5  - Wrist extensors: 5/5  L UE:  - Shoulder flexors: 5/5  - Shoulder extensors: 5/5  - Shoulder abductors: 5/5   - Shoulder external rotators: 5/5   - Shoulder internal rotators: 5/5  - Elbow flexors: 5/5  - Elbow extensors: 5/5  - Wrist flexors: 5/5  - Wrist extensors: 5/5                  ANGEL: RUE: 69lb (previously 62lb) LUE: 100lb Improved  The age norm is approximately R: 116.8;bs and L: 112.8 lbs, indicating decreased   strength..                 2 point pinch: RUE: 6lb , LUE: 10.5lb  (age norms are 18 lbs) Improved  3 point pinch: RUE: 12lb , LUE: 20lb  (age norms are 24 lbs) Improved  Lateral pinch: RUE: 20lb , LUE: 21lb  (age norms are 25 lbs) Maintained                  NINE-HOLE PEG TEST: assesses dexterity/fine motor coordination   R hand: 38 seconds (Prior 58) Improved  L hand: 47 seconds   Pt demonstrates decreased FMC compared to norms for age/sex (L: 18.62 seconds and R: 17.71 seconds)     Functional Dexterity Test: assesses patient's ability to use the hand for daily tasks requiring a 3-jaw federico prehension between the fingers and the thumb. Completed in a zig-zag pattern with unaffected UE first. 1  5-second penalty (each):  -Supinating forearm to assist  -Touching the board for assist    10-second penalty:  -Dropping a peg    R UE: 2 minutes 12 seconds no penalties = 132 seconds (Prior: 2min 58 sec + 2 penalties + 2 drops = 198 sec (prior 2 min 38 sec + 1 drop + 1 penalty = 173 seconds)) Improved  L UE:  44.4 seconds (previously: 58.39 seconds)     Norms based on age/sex: (Lucy et. al., 2013)    Age Group Sex Non-Dominant (50th percentile) Dominant (50th percentile)   20-49 Male 23.2 24.1     Pt performance on Functional Dexterity Test implies non functional hand compared to norms for age/sex.    Score by Functional Level Range (dominant hand) Range (non-dominant hand)   Functional  16-25 seconds 18-27 seconds   Moderately Functional  26-33 seconds 28-45 seconds   Minimally Functional 34-50 seconds 46-55 seconds   Nonfunctional >50 seconds >55 seconds     Subluxation: NONE    Scapular winging: MINIMAL    FUGYL JOHNS ASSESSMENT OF MOTOR RECOVERY AFTER STROKE: 1/16 NOT REASSESSED  R UE:  UPPER EXTREMITY SEATED: 35/36   WRIST: 7/10   HAND: 14/14   COORDINATION/SPEED: 5/6   OVERALL SCORE: 61/66   (Clinical change= 5 points, severe impairment <19, and mild impairment is >50)         Functional Cognition:  Highest  level of education: some college     [place] Cognitive Assessment Version 8.1 (MoCA V8.1)  NOT REASSESSED  Visuospatial/executive functionin/5  Namin/3  Memory: 1st trial:  , 2nd trial:    Attention/concentration:   List of letters:   Serial Seven Subtraction:  3/3 w/ 0 errors  Language/sentence repetition: 12  Language Fluency: 0/1 (previously: 0  Abstract/Correlational Thinkin/2   Delayed Recall:   Orientation:                Memory Index Score: 14/15  MoCA V1 8.3 Raw Score:  (previously: ), MIS:  13/15, indicative of no neurocognitive impairments.     MoCA Scoring              Normal: 26+              Mild Cognitive Impairment: 18-25               Moderate Cognitive Impairment: 10-17              Severe Cognitive Impairment: <10     Trail making Part A and Part B:   Part A: 48 seconds (Prior: 57 seconds) Improved  Part B: 1:22 (2min 12 seconds (prior 1:48 with no errors)) Improved  Indicating deficits: Part A > 24.40 seconds and Part B > 50.68 seconds       Contextual Memory Test:  not re-assessed  Immediate:  (previously ) IMPROVED  Delayed:   (previously ) IMPROVED    Treatment following re-evaluation:   Completed gripping with 50lb resistive ring with isometric holds for ~3 seconds and pt stating names of people in alphabetical order to address dual tasking.   Completed 3x beginner level IQ puzzler pro puzzle with pt using R hand to  and manipulate the pieces. Focus on FMC, dexterity, .       GOALS:     Short Term Goals:  Pt will improve  strength by 5 lbs (up to 67 lbs) in the R hand for improved performance during ADLs and IADLs. Achieved   Pt will improve time on TM parts B to within 120 seconds demonstrating improved divided attention required for IADLs. Achieved   Pt will demonstrate improved lateral pinch strength as evidenced by increase to 20 lbs for improved performance in ADLs/IADLs GOAL MET   Pt will  demonstrate improved three point pinch strength as evidenced by increase to 13 lbs for improved performance in ADLs/IADLs Progressing  Pt will demonstrate improved tip pinch strength as evidenced by increase to 7 lbs for improved performance in ADLs/IADLs Progressing  Pt will demonstrate improved dexterity required for ADLs/IADLs as demonstrated by completing FDT within 175 seconds, including penalties Achieved 2/20  Pt will demonstrate improved FMC required for ADLs/IADLs as demonstrated by completing 9-hole peg test within 50 seconds Achieved 2/20    Long Term Goals (8-12 weeks)  Pt will improve  strength by 5 lbs (up to 72 lbs) in the R hand for improved performance during ADLs and IADLs.   Pt will improve time on TM parts B to within 110 seconds demonstrating improved divided attention required for IADLs. Achieved 2/20  Pt will demonstrate improved lateral pinch strength as evidenced by increase to 22 lbs for improved performance in ADLs/IADLs   Pt will demonstrate improved three point pinch strength as evidenced by increase to 15 lbs for improved performance in ADLs/IADLs   Pt will demonstrate improved tip pinch strength as evidenced by increase to 9 lbs for improved performance in ADLs/IADLs   Pt will demonstrate improved dexterity required for ADLs/IADLs as demonstrated by completing FDT within 165 seconds, including penalties Achieved 2/20  Pt will demonstrate improved FMC required for ADLs/IADLs as demonstrated by completing 9-hole peg test within 45 seconds Achieved 2/20  Pt will improve R UE strength to 5/5 in all planes as measured via MMT thru use of strengthening program and HEP.     OTHER PLANNED THERAPY INTERVENTIONS:   Supine, seated, and in stance neuro re-ed  Tricep AG  NMES/FES  FMC/prehension  Timed Trials  Manual tx  Hand to target  Sensory re-ed  Seated functional reach: crossing midline  Supine place and hold  WBearing strategies   Closed chain activities  Open chain activities  Internal  and external memory aides

## 2025-02-25 ENCOUNTER — OFFICE VISIT (OUTPATIENT)
Facility: CLINIC | Age: 40
End: 2025-02-25
Payer: MEDICARE

## 2025-02-25 DIAGNOSIS — R26.89 OTHER ABNORMALITIES OF GAIT AND MOBILITY: ICD-10-CM

## 2025-02-25 DIAGNOSIS — I63.9 CEREBROVASCULAR ACCIDENT (CVA), UNSPECIFIED MECHANISM (HCC): Primary | ICD-10-CM

## 2025-02-25 DIAGNOSIS — Z91.81 STATUS POST FALL: ICD-10-CM

## 2025-02-25 DIAGNOSIS — I63.89 CEREBROVASCULAR ACCIDENT (CVA) DUE TO OTHER MECHANISM (HCC): Primary | ICD-10-CM

## 2025-02-25 DIAGNOSIS — R53.1 GENERALIZED WEAKNESS: ICD-10-CM

## 2025-02-25 PROCEDURE — 97112 NEUROMUSCULAR REEDUCATION: CPT | Performed by: PHYSICAL THERAPIST

## 2025-02-25 PROCEDURE — 97112 NEUROMUSCULAR REEDUCATION: CPT

## 2025-02-25 NOTE — PROGRESS NOTES
"Daily Note     Today's date: 2025  Patient name: Mateus Mckeon  : 1985  MRN: [id number]  Referring provider: Dania Sher DO  Dx:   Encounter Diagnosis     ICD-10-CM    1. Cerebrovascular accident (CVA), unspecified mechanism (HCC)  I63.9       2. Other abnormalities of gait and mobility  R26.89       3. Status post fall  Z91.81                                       POC expires Auth Status Total   Visits  Start date  Expiration date PT/OT + Visit Limit? Co-Insurance   2/10/25 Submitted to FD 25, AV pending pending pending BOMN Yes, $0 co-pay                                               Subjective: Patient presents to clinic with cane, no new reports.    Objective: See treatment diary below  *FLUID RESTRICTION- do not offer water*     BP start of session: 150/70 mmHg    Per [employer]: >180/110 mmHg at rest, or >250/115 mmHg with activity, need to stop     NMR/TE: Gait Belt and CGA/CS (2 min on/ 1 min off)     2.5# ankle weights   - Sidestepping, 0UE  - LAT hurdles 6\" 0 UE   - FWD hurdles 6-9\"  0 UE  - Step-ups to medium river rocks, 0 UE  - Obstacle course across medium and small river rocks, 0 UE   - Standing on foam pad with head turns x 2 min  - Standing on foam pad with head nods x 2 min  - Standing on foam pad w/ 5.5# tidal tank trunk rotations x 2 min (split up into 1 minute intervals with seated rest btwn)    Assessment: Patient tolerated treatment well today. BP within more normal range for him today and dizziness more minimal. Added in river rock obstacle course to mimic pt walking outside with his dog, as he reports some areas in his yard feel uneven and similar to the river rocks. He was most challenged with that activity and with tidal tank trunk rotations on foam pad, but overall tolerated session very well and progressing nicely. Patient would benefit from continued PT to improve balance, endurance, and reduce fall risk.     Plan: Continue per plan of " care.          HEP: walking on treadmill at home and exercises below        Outcome Measures Initial Eval  1/30/24 PN/DC  9/4/2024 Initial Evaluation  11/18/24 PN  12/24/2024 Re-eval  1/16/2025 Re-eval  2/18/2025   5xSTS 31.25 sec 13.67 sec   no UE defer 16.33 sec,   no UE 21.03 sec, no UE  13.07 sec, no UE   TUG 16.3 sec with rollator    23.6 sec no AD 11.48 sec   no AD 18.91 sec w/ rollator     17.16 sec no AD 12.18 sec   no AD 15.72 sec, no AD 10.87 sec, no AD   10 meter  1.11 m/s self selected pace 0.84 m/s self selected pace  1.05 m/s self selected pace 0.74 m/s 1.14 m/s   MEDRANO  49/56 32/56 40/56 40/56 43/56   FGA 12/30 15/30 defer 12/30 11/30 16/30   6MWT 900 ft 955 ft no AD defer 825 ft no  ft, no  ft, no AD   mCTSIB  FTEO firm  FTEC firm  FTEO foam  FTEC foam   30 sec  30 sec  30 sec  Unable defer 30 sec  30 sec  30 sec  2 sec 30 sec  30 sec  16 sec  2 sec 30 sec  30 sec  17 sec  0 sec   ABC  66.88% 62.5% 57.5% 49.38% 60.63%        Precautions: Check BP's RUE only (fistula LUE), frequent falls  Past Medical History:   Diagnosis Date    Acute kidney injury (HCC)     Ambulates with cane     Anuria     Anxiety     Cellulitis of right elbow 03/31/2021    Chronic kidney disease     COVID-19 10/18/2024    Depression     Diabetes mellitus (HCC)     Diarrhea     Emesis 10/24/2020    End stage renal disease (HCC) 02/11/2018    Formatting of this note might be different from the original. Last Assessment & Plan:  Secondary to DM.  On nightly PD.  Followed by Nephro.  [facility] Formatting of this note might be different from the original. Last Assessment & Plan:  Formatting of this note might be different from the original. 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    Esophagitis 07/21/2015    Falls     Gastroparesis     GERD (gastroesophageal reflux disease)     History of shingles 2010    History of  transfusion 02/2018    no adverse reaction    Hyperlipidemia     Hyperphosphatemia     Hypertension     Hypoglycemia 07/15/2022    Itching     Mastoiditis of right side 07/15/2022    Muscle weakness     general unsteadiness    Obesity (BMI 30.0-34.9) 09/09/2019    Orthostatic hypotension 10/25/2020    Peripheral polyneuropathy 11/20/2019    PONV (postoperative nausea and vomiting) 01/26/2018    Protein-calorie malnutrition (HCC) 11/23/2020    Recurrent peritonitis (HCC) due to peritoneal dialysis catheter 07/31/2020    Retinopathy     Seizures (HCC)     early 2020 - one time    Skin abnormality     some dime size areas where skin was scratched from itching    Sleep apnea     Spontaneous bacterial peritonitis (HCC) 10/19/2020    Squamous cell skin cancer     left temple    Stroke (HCC)     x2 - off balance/no driving/fatigue    Swelling of both lower extremities     Swelling of joint of upper arm, right 04/03/2024    Traumatic onycholysis 07/21/2022    Vomiting     Wears glasses     Word finding difficulty 11/05/2024

## 2025-02-25 NOTE — PROGRESS NOTES
"OT Daily Note     Today's date: 2025  Patient name: Mateus Mckeon  : 1985  MRN: [id number]  Referring provider: Dania Sher DO  Dx:   Encounter Diagnosis     ICD-10-CM    1. Cerebrovascular accident (CVA) due to other mechanism (HCC)  I63.89       2. Generalized weakness  R53.1             Start Time: 0802  Stop Time: 0845  Total time in clinic (min): 43 minutes    PLAN OF CARE START: 2025  PLAN OF CARE END: 2025  FREQUENCY: 2x/wk  PRECAUTIONS: Renal failure, actively going through dialysis; type 1 diabetes; HTN; SAH; seizure (last one was 2019)    Subjective: Pt states he would like to add in \"mind exercises\" today.     Objective: See treatment diary below    NMR:  Juxacizor while pt states place names in alphabetical order. Working on UE endurance, motor control, and dual tasking.   Calibrated gripper with 30 lbs resistance, 3 second hold, and pt stating names of foods going in reverse alphabetical order. Focus on  strength, endurance, dual tasking  Completed peg board activity with pt following design provided and stating names of cars for each peg placed into the board. Working on FMC, dexterity, dual tasking.     Assessment: Pt tolerated session well today. Improved endurance noted during today's session with less rest breaks required. Pt would benefit from continued skilled occupational therapy services to improve fine motor coordination, dexterity, strength, UE function, and functional cognition.    Plan: Continue per plan of care.       SHORT TERM GOALS ADDED 24:  Pt will increase sustained attention by completing trail making part A in 35 seconds to complete IADLs NOT MET  Pt will increase divided attention by completing trail making part B in 1 minute 30 seconds to complete IADLs NOT MET  Pt will increase delayed recall and recall 4 /5 items on MOCA to complete IADLs GOAL MET    LONG TERM GOALS ADDED 24:  Pt will increase sustained attention by completing " trail making part A in 38 seconds to complete IADLs  Pt will increase divided attention by completing trail making part B in 1 minute 10 seconds to complete IADLs  Pt will increase delayed recall and recall 5/5 items on MOCA to complete IADLs  Resume right  hand dominance to refined functional assist with all activities of daily living]

## 2025-02-27 ENCOUNTER — APPOINTMENT (OUTPATIENT)
Facility: CLINIC | Age: 40
End: 2025-02-27
Payer: MEDICARE

## 2025-02-28 DIAGNOSIS — G35 OPTIC NEURITIS DUE TO MULTIPLE SCLEROSIS (HCC): ICD-10-CM

## 2025-02-28 DIAGNOSIS — R80.1 PERSISTENT PROTEINURIA: ICD-10-CM

## 2025-02-28 DIAGNOSIS — E72.11 HYPERHOMOCYSTEINEMIA (HCC): ICD-10-CM

## 2025-02-28 DIAGNOSIS — R11.0 NAUSEA: ICD-10-CM

## 2025-02-28 DIAGNOSIS — N18.30 ACUTE RENAL FAILURE WITH ACUTE TUBULAR NECROSIS SUPERIMPOSED ON STAGE 3 CHRONIC KIDNEY DISEASE (HCC): ICD-10-CM

## 2025-02-28 DIAGNOSIS — N17.0: ICD-10-CM

## 2025-02-28 DIAGNOSIS — R79.89 AZOTEMIA: ICD-10-CM

## 2025-02-28 DIAGNOSIS — H51.0 BINOCULAR VISION DISORDER WITH CONJUGATE GAZE PALSY: ICD-10-CM

## 2025-02-28 DIAGNOSIS — E83.39 HYPERPHOSPHATEMIA: ICD-10-CM

## 2025-02-28 DIAGNOSIS — N18.2: ICD-10-CM

## 2025-02-28 DIAGNOSIS — Z86.73 HISTORY OF LACUNAR CEREBROVASCULAR ACCIDENT (CVA): ICD-10-CM

## 2025-02-28 DIAGNOSIS — H53.30 BINOCULAR VISUAL DISTURBANCE: ICD-10-CM

## 2025-02-28 DIAGNOSIS — I63.9 CEREBROVASCULAR ACCIDENT (CVA) OF LEFT PONTINE STRUCTURE (HCC): ICD-10-CM

## 2025-02-28 DIAGNOSIS — I16.0 HYPERTENSIVE URGENCY: ICD-10-CM

## 2025-02-28 DIAGNOSIS — H46.9 OPTIC NEURITIS DUE TO MULTIPLE SCLEROSIS (HCC): ICD-10-CM

## 2025-02-28 DIAGNOSIS — E10.21 TYPE 1 DIABETES MELLITUS WITH DIABETIC NEPHROPATHY, WITH LONG-TERM CURRENT USE OF INSULIN (HCC): Chronic | ICD-10-CM

## 2025-02-28 DIAGNOSIS — I63.9 CEREBROVASCULAR ACCIDENT (CVA), UNSPECIFIED MECHANISM (HCC): ICD-10-CM

## 2025-02-28 DIAGNOSIS — H53.8 BLURRED VISION: ICD-10-CM

## 2025-02-28 DIAGNOSIS — E55.9 VITAMIN D DEFICIENCY: ICD-10-CM

## 2025-02-28 DIAGNOSIS — N17.0 ACUTE RENAL FAILURE WITH ACUTE TUBULAR NECROSIS SUPERIMPOSED ON STAGE 3 CHRONIC KIDNEY DISEASE (HCC): ICD-10-CM

## 2025-02-28 RX ORDER — ATORVASTATIN CALCIUM 40 MG/1
40 TABLET, FILM COATED ORAL EVERY EVENING
Qty: 90 TABLET | Refills: 1 | Status: SHIPPED | OUTPATIENT
Start: 2025-02-28

## 2025-03-04 ENCOUNTER — OFFICE VISIT (OUTPATIENT)
Facility: CLINIC | Age: 40
End: 2025-03-04
Payer: MEDICARE

## 2025-03-04 DIAGNOSIS — I63.9 CEREBROVASCULAR ACCIDENT (CVA), UNSPECIFIED MECHANISM (HCC): Primary | ICD-10-CM

## 2025-03-04 DIAGNOSIS — R53.1 GENERALIZED WEAKNESS: ICD-10-CM

## 2025-03-04 DIAGNOSIS — R26.89 OTHER ABNORMALITIES OF GAIT AND MOBILITY: ICD-10-CM

## 2025-03-04 DIAGNOSIS — Z99.2 END STAGE RENAL DISEASE ON DIALYSIS (HCC): ICD-10-CM

## 2025-03-04 DIAGNOSIS — N18.6 END STAGE RENAL DISEASE ON DIALYSIS (HCC): ICD-10-CM

## 2025-03-04 DIAGNOSIS — I63.89 CEREBROVASCULAR ACCIDENT (CVA) DUE TO OTHER MECHANISM (HCC): Primary | ICD-10-CM

## 2025-03-04 DIAGNOSIS — Z91.81 STATUS POST FALL: ICD-10-CM

## 2025-03-04 PROCEDURE — 97112 NEUROMUSCULAR REEDUCATION: CPT | Performed by: PHYSICAL THERAPIST

## 2025-03-04 PROCEDURE — 97110 THERAPEUTIC EXERCISES: CPT | Performed by: OCCUPATIONAL THERAPIST

## 2025-03-04 PROCEDURE — 97112 NEUROMUSCULAR REEDUCATION: CPT | Performed by: OCCUPATIONAL THERAPIST

## 2025-03-04 NOTE — PROGRESS NOTES
"Daily Note     Today's date: 3/4/2025  Patient name: Mateus Mckeon  : 1985  MRN: [id number]  Referring provider: Dania Sher DO  Dx:   Encounter Diagnosis     ICD-10-CM    1. Cerebrovascular accident (CVA), unspecified mechanism (HCC)  I63.9       2. Other abnormalities of gait and mobility  R26.89       3. Status post fall  Z91.81                                         POC expires Auth Status Total   Visits  Start date  Expiration date PT/OT + Visit Limit? Co-Insurance   2/10/25 Submitted to FD 25, AV pending pending pending BOMN Yes, $0 co-pay                                               Subjective: Patient presents to clinic with cane, no new reports.    Objective: See treatment diary below  *FLUID RESTRICTION- do not offer water*     BP start of session: 140/70 mmHg    Per [employer]: >180/110 mmHg at rest, or >250/115 mmHg with activity, need to stop     NMR/TE: Gait Belt and CGA/CS (2 min on/ 1 min off)     2.5# ankle weights   - Sidestepping, 0UE  - LAT hurdles 6\" 0 UE   - FWD hurdles 6-9\"  0 UE  - Step-ups to medium river rocks, 0 UE  - Obstacle course across medium and small river rocks, 0 UE   - Standing on foam pad with head turns x 2 min  - Standing on foam pad with head nods x 2 min  - Standing on foam pad w/ 5.5# tidal tank trunk rotations x 2 min     Assessment: Patient tolerated treatment well today. BP within more normal range for him today and dizziness minimal. He demonstrates improved stability on foam pad today, with most imbalance noted on river rocks. Patient would benefit from continued PT to improve balance, endurance, and reduce fall risk.     Plan: Continue per plan of care.          HEP: walking on treadmill at home and exercises below        Outcome Measures Initial Eval  24 PN/DC  2024 Initial Evaluation  24 PN  2024 Re-eval  2025 Re-eval  2025   5xSTS 31.25 sec 13.67 sec   no UE defer 16.33 sec,   no UE 21.03 " sec, no UE  13.07 sec, no UE   TUG 16.3 sec with rollator    23.6 sec no AD 11.48 sec   no AD 18.91 sec w/ rollator     17.16 sec no AD 12.18 sec   no AD 15.72 sec, no AD 10.87 sec, no AD   10 meter  1.11 m/s self selected pace 0.84 m/s self selected pace  1.05 m/s self selected pace 0.74 m/s 1.14 m/s   MEDRANO  49/56 32/56 40/56 40/56 43/56   FGA 12/30 15/30 defer 12/30 11/30 16/30   6MWT 900 ft 955 ft no AD defer 825 ft no  ft, no  ft, no AD   mCTSIB  FTEO firm  FTEC firm  FTEO foam  FTEC foam   30 sec  30 sec  30 sec  Unable defer 30 sec  30 sec  30 sec  2 sec 30 sec  30 sec  16 sec  2 sec 30 sec  30 sec  17 sec  0 sec   ABC  66.88% 62.5% 57.5% 49.38% 60.63%        Precautions: Check BP's RUE only (fistula LUE), frequent falls  Past Medical History:   Diagnosis Date    Acute kidney injury (HCC)     Ambulates with cane     Anuria     Anxiety     Cellulitis of right elbow 03/31/2021    Chronic kidney disease     COVID-19 10/18/2024    Depression     Diabetes mellitus (HCC)     Diarrhea     Emesis 10/24/2020    End stage renal disease (HCC) 02/11/2018    Formatting of this note might be different from the original. Last Assessment & Plan:  Secondary to DM.  On nightly PD.  Followed by Nephro.  [facility] Format[facility] of this note might be different from the original. Last Assessment & Plan:  Formatting of this note might be different from the original. 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    Esophagitis 07/21/2015    Falls     Gastroparesis     GERD (gastroesophageal reflux disease)     History of shingles 2010    History of transfusion 02/2018    no adverse reaction    Hyperlipidemia     Hyperphosphatemia     Hypertension     Hypoglycemia 07/15/2022    Itching     Mastoiditis of right side 07/15/2022    Muscle weakness     general unsteadiness    Obesity (BMI 30.0-34.9) 09/09/2019    Orthostatic hypotension  10/25/2020    Peripheral polyneuropathy 11/20/2019    PONV (postoperative nausea and vomiting) 01/26/2018    Protein-calorie malnutrition (HCC) 11/23/2020    Recurrent peritonitis ([facility]) due to peritoneal dialysis catheter 07/31/2020    Retinopathy     Seizures ([facility])     early 2020 - one time    Skin abnormality     some dime size areas where skin was scratched from itching    Sleep apnea     Spontaneous bacterial peritonitis ([facility]) 10/19/2020    Squamous cell skin cancer     left temple    Stroke ([facility])     x2 - off balance/no driving/fatigue    Swelling of both lower extremities     Swelling of joint of upper arm, right 04/03/2024    Traumatic onycholysis 07/21/2022    Vomiting     Wears glasses     Word finding difficulty 11/05/2024

## 2025-03-04 NOTE — PROGRESS NOTES
OT Daily Note     Today's date: 3/4/2025  Patient name: Mateus Mckeon  : 1985  MRN: [id number]  Referring provider: Dania Sher DO  Dx:   Encounter Diagnosis     ICD-10-CM    1. Cerebrovascular accident (CVA) due to other mechanism (HCC)  I63.89       2. Generalized weakness  R53.1       3. End stage renal disease on dialysis (HCC)  N18.6     Z99.2             Start Time: 801  Stop Time: 842  Total time in clinic (min): 41 minutes    PLAN OF CARE START: 2025  PLAN OF CARE END: 2025  FREQUENCY: 2x/wk  PRECAUTIONS: Renal failure, actively going through dialysis; type 1 diabetes; HTN; SAH; seizure (last one was )    Subjective: Pt states he was diagnosed with cataracts since he was last here.     Objective: See treatment diary below    TE:  -Calibrated gripper with 35lbs resistance 1 set each hand to fatigue while naming TV/movie characters, and bands/musicians.  NMR:  -Rubber band puzzle with R hand only focusing on R FMC,  skills. Able to self ID errors and correct them.    Assessment: Pt tolerated session well today. Improved endurance noted during today's session with less rest breaks required. Pt would benefit from continued skilled occupational therapy services to improve fine motor coordination, dexterity, strength, UE function, and functional cognition.    Plan: Continue per plan of care.       SHORT TERM GOALS ADDED 24:  Pt will increase sustained attention by completing trail making part A in 35 seconds to complete IADLs NOT MET  Pt will increase divided attention by completing trail making part B in 1 minute 30 seconds to complete IADLs NOT MET  Pt will increase delayed recall and recall 4 /5 items on MOCA to complete IADLs GOAL MET    LONG TERM GOALS ADDED 24:  Pt will increase sustained attention by completing trail making part A in 38 seconds to complete IADLs  Pt will increase divided attention by completing trail making part B in 1 minute 10 seconds to  complete IADLs  Pt will increase delayed recall and recall 5/5 items on MOCA to complete IADLs  Resume right  hand dominance to refined functional assist with all activities of daily living]

## 2025-03-05 ENCOUNTER — TELEPHONE (OUTPATIENT)
Age: 40
End: 2025-03-05

## 2025-03-05 DIAGNOSIS — Z86.73 HISTORY OF CVA (CEREBROVASCULAR ACCIDENT): Primary | ICD-10-CM

## 2025-03-05 DIAGNOSIS — R47.89 WORD FINDING DIFFICULTY: ICD-10-CM

## 2025-03-06 ENCOUNTER — HOSPITAL ENCOUNTER (OUTPATIENT)
Dept: NEUROLOGY | Facility: CLINIC | Age: 40
Discharge: HOME | End: 2025-03-06

## 2025-03-06 ENCOUNTER — APPOINTMENT (OUTPATIENT)
Facility: CLINIC | Age: 40
End: 2025-03-06
Payer: MEDICARE

## 2025-03-06 DIAGNOSIS — Z86.73 HISTORY OF CVA (CEREBROVASCULAR ACCIDENT): ICD-10-CM

## 2025-03-06 DIAGNOSIS — R56.9 PROVOKED SEIZURE (HCC): ICD-10-CM

## 2025-03-06 DIAGNOSIS — G25.3 MYOCLONIC JERKING: ICD-10-CM

## 2025-03-07 ENCOUNTER — HOSPITAL ENCOUNTER (OUTPATIENT)
Dept: NEUROLOGY | Facility: CLINIC | Age: 40
End: 2025-03-07
Payer: MEDICARE

## 2025-03-07 DIAGNOSIS — G25.3 MYOCLONIC JERKING: ICD-10-CM

## 2025-03-07 DIAGNOSIS — R56.9 PROVOKED SEIZURE (HCC): ICD-10-CM

## 2025-03-07 DIAGNOSIS — Z86.73 HISTORY OF CVA (CEREBROVASCULAR ACCIDENT): ICD-10-CM

## 2025-03-07 PROCEDURE — 95708 EEG WO VID EA 12-26HR UNMNTR: CPT

## 2025-03-07 PROCEDURE — 95719 EEG PHYS/QHP EA INCR W/O VID: CPT | Performed by: PSYCHIATRY & NEUROLOGY

## 2025-03-12 ENCOUNTER — RA CDI HCC (OUTPATIENT)
Dept: OTHER | Facility: HOSPITAL | Age: 40
End: 2025-03-12

## 2025-03-12 NOTE — PROGRESS NOTES
HCC coding opportunities          Chart Reviewed number of suggestions sent to Provider: 4  E10.22  E10.3512  E10.3591  I12.0     Patients Insurance     Medicare Insurance: Medicare

## 2025-03-13 ENCOUNTER — OFFICE VISIT (OUTPATIENT)
Facility: CLINIC | Age: 40
End: 2025-03-13
Payer: MEDICARE

## 2025-03-13 ENCOUNTER — EVALUATION (OUTPATIENT)
Dept: SPEECH THERAPY | Facility: CLINIC | Age: 40
End: 2025-03-13
Payer: MEDICARE

## 2025-03-13 DIAGNOSIS — R41.841 COGNITIVE COMMUNICATION DEFICIT: Primary | ICD-10-CM

## 2025-03-13 DIAGNOSIS — I63.89 CEREBROVASCULAR ACCIDENT (CVA) DUE TO OTHER MECHANISM (HCC): Primary | ICD-10-CM

## 2025-03-13 DIAGNOSIS — I63.9 CEREBROVASCULAR ACCIDENT (CVA), UNSPECIFIED MECHANISM (HCC): ICD-10-CM

## 2025-03-13 DIAGNOSIS — I63.9 CEREBROVASCULAR ACCIDENT (CVA), UNSPECIFIED MECHANISM (HCC): Primary | ICD-10-CM

## 2025-03-13 DIAGNOSIS — Z91.81 STATUS POST FALL: ICD-10-CM

## 2025-03-13 DIAGNOSIS — R53.1 GENERALIZED WEAKNESS: ICD-10-CM

## 2025-03-13 DIAGNOSIS — N18.6 END STAGE RENAL DISEASE ON DIALYSIS (HCC): ICD-10-CM

## 2025-03-13 DIAGNOSIS — R26.89 OTHER ABNORMALITIES OF GAIT AND MOBILITY: ICD-10-CM

## 2025-03-13 DIAGNOSIS — Z99.2 END STAGE RENAL DISEASE ON DIALYSIS (HCC): ICD-10-CM

## 2025-03-13 PROCEDURE — 97112 NEUROMUSCULAR REEDUCATION: CPT | Performed by: PHYSICAL THERAPIST

## 2025-03-13 PROCEDURE — 96125 COGNITIVE TEST BY HC PRO: CPT | Performed by: SPEECH-LANGUAGE PATHOLOGIST

## 2025-03-13 PROCEDURE — 97112 NEUROMUSCULAR REEDUCATION: CPT

## 2025-03-13 NOTE — PROGRESS NOTES
OT Daily Note     Today's date: 3/13/2025  Patient name: Mateus Mckeon  : 1985  MRN: [id number]  Referring provider: Dania Sher DO  Dx:   Encounter Diagnosis     ICD-10-CM    1. Cerebrovascular accident (CVA) due to other mechanism (HCC)  I63.89       2. Generalized weakness  R53.1       3. End stage renal disease on dialysis (HCC)  N18.6     Z99.2               Start Time: 803  Stop Time: 845  Total time in clinic (min): 42 minutes    PLAN OF CARE START: 2025  PLAN OF CARE END: 2025  FREQUENCY: 2x/wk  PRECAUTIONS: Renal failure, actively going through dialysis; type 1 diabetes; HTN; SAH; seizure (last one was )    Subjective: Pt states he does his HEP as possible when he is not too fatigued from dialysis.     Objective: See treatment diary below    NMR:  Green flex bar bends 30x down, 30x up for bilateral UE strength motor control.   Calibrated gripper with 35lbs resistance with pt picking up multimatrix cubes and matching letter on top of shapes ing alphabetical order. Pt with 2 lb wrist weight donned for proprioceptive input. R hand observed to fatigeu ~residential thru exercise, downgraded to 25 lbs resistance with improved performance. Working on  strength, endurance, motor control.   Chain link exercise performed while seated at table to work on FMC, dexterity.     Assessment: Pt tolerated session well today. Improved endurance noted during today's session with less rest breaks required. Pt would benefit from continued skilled occupational therapy services to improve fine motor coordination, dexterity, strength, UE function, and functional cognition.    Plan: Continue per plan of care.       SHORT TERM GOALS ADDED 24:  Pt will increase sustained attention by completing trail making part A in 35 seconds to complete IADLs NOT MET  Pt will increase divided attention by completing trail making part B in 1 minute 30 seconds to complete IADLs NOT MET  Pt will increase  delayed recall and recall 4 /5 items on MOCA to complete IADLs GOAL MET    LONG TERM GOALS ADDED 2/20/24:  Pt will increase sustained attention by completing trail making part A in 38 seconds to complete IADLs  Pt will increase divided attention by completing trail making part B in 1 minute 10 seconds to complete IADLs  Pt will increase delayed recall and recall 5/5 items on MOCA to complete IADLs  Resume right  hand dominance to refined functional assist with all activities of daily living]

## 2025-03-13 NOTE — PROGRESS NOTES
Speech-Language Pathology Initial Evaluation    Today's date: 3/13/2025   Patient’s name: Mateus Mckeon  : 1985  MRN: [id number]  Safety measures:   Referring provider: Dania Sher DO Encounter Diagnosis     ICD-10-CM    1. Cognitive communication deficit  R41.841       2. Cerebrovascular accident (CVA), unspecified mechanism (HCC)  I63.9           Assessment:  Patient presents with mild to moderate cognitive linguistic deficits in expressive language and attention. Expressive language was notable for mild word finding difficulty. Visual naming was intact but mild difficulty was displayed during generative naming tasks.  Generative naming involves rapid naming within specific semantic or phonemic boundaries. This task combines executive word finding and working memory. Generative naming is a means of assessing executive function within the setting of expressive language as the task combines naming, working memory, self monitoring and sustained attention.  Mateus's raw score for generative naming was 10 words per minute (adjusted to 14 as per test guidelines). Average scores are 15 words per minute or above. Moderate deficits were detected in attention. The RBANS assesses attention in two areas: sustained attention and alternating attention. Sustained attention refers to the ability to maintain continuous effort (in this case mental concentration) over time. Related functional activities involve ignoring a variety of distractions and inhibiting  attention shifts to unrelated activities. Alternating attention refers to the ability to switch between tasks - to stop one task to participate in another and then be able to return to the initial task. Attention regulated by  the Executive Function system. Mateus did well with sustained attention for numbers (scaled score of 12) but displayed greater difficulty with the alternating attention subtest (scaled score of 1). Executive function relates  "to a set of mental processes that enable planning, focus, attention and memory in order to complete tasks. Working memory and mental flexibility are two key components of executive function.       Short-term goals:  1. Patient will complete attention dependent tasks with less than 2 errors per activity or 90% accuracy.   2. Improve high level word finding to 80% accuracy  3. Complete executive function dependent tasks with 80% accuracy or higher.     Long-term goals:  -Improve cognitive linguistic function.       Plan:  Patient would benefit from outpatient skilled Speech Therapy services: Cognitive-linguistic therapy    Frequency: 1-2x weekly  Duration: 3 months    Intervention certification from: 3/13/2025  Intervention certification to: 2025      Subjective:  History of present illness: Patient is a 39 y.o. male who was referred to outpatient skilled Speech Therapy services for a cognitive-linguistic evaluation.   He has a history of cerebellar and pontine strokes, prothrombin gene mutation, MTHFR mutation, ESRD on HD.  On 2024 he presented to hospital with headache and found to have SAH.  He had a cerebral angiogram.  MRI brain study showed punctate strokes in the right MCA territory, left insular strokes, and right velia strokes.  He was found to have right arm weakness after a second angiogram (strokes were thought to be a complication from conventional angiogram).  He is on aspirin 81mg daily.  He was seen by Dr. Mittal regarding the SAH, no aneurysm was found, so the diagnosis was \"benign perimesencephalic hemorrhage\" due to no vascular etiology for SAH.     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     2/20/2020  MRI brain w/o  Chronic lacunar infarcts in the left centrum semiovale and left thalamus  Previously demonstrated punctate infarct in the posterior velia is no longer visualized  Periventricular/subcortical T2 hyperintensities  Stable enlargement of the ventricles and sulci, consistent with volume loss      1/14/2024 - MRI brain  Crescendo in multifocal areas of diffusion restriction in multiple vascular territories (new from prior study)  Progressive acute infarction  Diffusion restriction in the left occipital lobe indicative of an interval posterior cerebral artery infarction  Right occipital lobe cortex, subtle cortical focus of diffusion restriction  Right frontal cluster, right frontal operculum, left insular-subinsular of DWI lesions  Scattered areas of hemosiderin in the right frontal convexity, left frontotemporal regions, left frontoparietal region.     2/13/2024 - MRI brain   Multifocal gliosis and encephalomalacia with bandlike distribution in the supratentorial brain  Left cerebellum, left occipital lobe gliosis and encephalomalacia  Chronic bilateral basal ganglia lacunar infarctions  Evolving diffusion restriction in the left centrum semiovale  Superficial siderosis in the left frontoparietal region  Right frontal lobe hemosiderin staining     11/5/2024 - CTA head/neck  No large vessel occlusion or stenosis     11/6/2024 - MRI brain  Chronic ischemia in the left corona radiata  Small scattered hyperintensities on T2/FLAIR imaging in the periventricular and subcortical white matter  Hemosiderosis is unchanged.  No acute infarct is present.           Patient's goal(s): To improve word finding skills     Pain: Absent (scale:  0 )    Hearing: WFL  Vision: WFL      Highest level of education: Bachelor's degree  Vocational status: Unknown      Objective:  The Repeatable Battery for the Assessment of  Neuropsychological Status (RBANS) is a brief, individually-administered assessment which measures attention, language, visuospatial/constructional abilities, and immediate & delayed memory. The RBANS is intended for use with adolescents to adults, ages 12 to 89 years. The following results were obtained during the administration of the assessment.    Form: B    Cognitive Domain/Subtest: Index Score: Percentile Rank: Classification:   IMMEDIATE MEMORY 109 73%ile Average        1. List Learning (31/40)        2. Story Memory (20/24)       VISUOSPATIAL/  CONSTRUCTIONAL 102 55%ile Average        3. Figure Copy (18/20)        4. Line Orientation (20/20)       LANGUAGE 85 16%ile Low Average        5. Picture Naming (10/10)        6. Semantic Fluency (14/40)       ATTENTION 79 8%ile Borderline        7. Digit Span (12/16)        8. Coding (18/89)       DELAYED MEMORY 114 82%ile High Average        9. List Recall (6/10)        10. List Recognition (20/20)        11. Story Recall (12/12)        12. Figure Recall (18/20)         Sum of Index Scores:  489   Total Score:  96   Percentile: 39%ile   Classification: Average         Treatment:  Advised/recommended.       Visit Tracking:  POC   Expires Auth Expiration Date ST Visit Limit   6/13/25 Auth needed for secondary insurance after 24  visits None -  primary - based on medical necessity                 Intervention Comments:  Mateus participated well with today's assessment.

## 2025-03-13 NOTE — PROGRESS NOTES
"Daily Note     Today's date: 3/13/2025  Patient name: Mateus Mckeon  : 1985  MRN: [id number]  Referring provider: Dania Sher DO  Dx:   Encounter Diagnosis     ICD-10-CM    1. Cerebrovascular accident (CVA), unspecified mechanism (HCC)  I63.9       2. Other abnormalities of gait and mobility  R26.89       3. Status post fall  Z91.81                                           POC expires Auth Status Total   Visits  Start date  Expiration date PT/OT + Visit Limit? Co-Insurance   2/10/25 Submitted to FD 25, AV pending pending pending BOMN Yes, $0 co-pay                                               Subjective: Patient presents to clinic with cane, no new reports.    Objective: See treatment diary below  *FLUID RESTRICTION- do not offer water*     BP start of session: 135/70 mmHg    Per [employer]: >180/110 mmHg at rest, or >250/115 mmHg with activity, need to stop     NMR/TE: Gait Belt and CGA/CS (2 min on/ 1 min off)     3# ankle weights   - Sidestepping, 0UE  - LAT hurdles 6\" 0 UE   - FWD hurdles 6-9\"  0 UE  - Step-ups to medium river rocks, 0 UE  - Obstacle course across medium and small river rocks, 0 UE   - Standing on foam pad with head turns x 2 min  - Standing on foam pad with head nods x 2 min  - Standing on foam pad w/ 5.5# tidal tank trunk rotations x 2 min     Assessment: Patient tolerated treatment well today. Continues to have most difficulty with river rock negotiation, requiring min A at times. Progressed to 3# ankle weights today. Patient would benefit from continued PT to improve balance, endurance, and reduce fall risk.     Plan: Continue per plan of care.          HEP: walking on treadmill at home and exercises below        Outcome Measures Initial Eval  24 PN/DC  2024 Initial Evaluation  24 PN  2024 Re-eval  2025 Re-eval  2025   5xSTS 31.25 sec 13.67 sec   no UE defer 16.33 sec,   no UE 21.03 sec, no UE  13.07 sec, no UE   TUG " 16.3 sec with rollator    23.6 sec no AD 11.48 sec   no AD 18.91 sec w/ rollator     17.16 sec no AD 12.18 sec   no AD 15.72 sec, no AD 10.87 sec, no AD   10 meter  1.11 m/s self selected pace 0.84 m/s self selected pace  1.05 m/s self selected pace 0.74 m/s 1.14 m/s   MEDRANO  49/56 32/56 40/56 40/56 43/56   FGA 12/30 15/30 defer 12/30 11/30 16/30   6MWT 900 ft 955 ft no AD defer 825 ft no  ft, no  ft, no AD   mCTSIB  FTEO firm  FTEC firm  FTEO foam  FTEC foam   30 sec  30 sec  30 sec  Unable defer 30 sec  30 sec  30 sec  2 sec 30 sec  30 sec  16 sec  2 sec 30 sec  30 sec  17 sec  0 sec   ABC  66.88% 62.5% 57.5% 49.38% 60.63%        Precautions: Check BP's RUE only (fistula LUE), frequent falls  Past Medical History:   Diagnosis Date    Acute kidney injury (HCC)     Ambulates with cane     Anuria     Anxiety     Cellulitis of right elbow 03/31/2021    Chronic kidney disease     COVID-19 10/18/2024    Depression     Diabetes mellitus (HCC)     Diarrhea     Emesis 10/24/2020    End stage renal disease (HCC) 02/11/2018    Formatting of this note might be different from the original. Last Assessment & Plan:  Secondary to DM.  On nightly PD.  Followed by Nephro.  [facility] Formatting of this note might be different from the original. Last Assessment & Plan:  Formatting of this note might be different from the original. 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    Esophagitis 07/21/2015    Falls     Gastroparesis     GERD (gastroesophageal reflux disease)     History of shingles 2010    History of transfusion 02/2018    no adverse reaction    Hyperlipidemia     Hyperphosphatemia     Hypertension     Hypoglycemia 07/15/2022    Itching     Mastoiditis of right side 07/15/2022    Muscle weakness     general unsteadiness    Obesity (BMI 30.0-34.9) 09/09/2019    Orthostatic hypotension 10/25/2020    Peripheral  polyneuropathy 11/20/2019    PONV (postoperative nausea and vomiting) 01/26/2018    Protein-calorie malnutrition (HCC) 11/23/2020    Recurrent peritonitis (HCC) due to peritoneal dialysis catheter 07/31/2020    Retinopathy     Seizures ([facility])     early 2020 - one time    Skin abnormality     some dime size areas where skin was scratched from itching    Sleep apnea     Spontaneous bacterial peritonitis (HCC) 10/19/2020    Squamous cell skin cancer     left temple    Stroke ([facility])     x2 - off balance/no driving/fatigue    Swelling of both lower extremities     Swelling of joint of upper arm, right 04/03/2024    Traumatic onycholysis 07/21/2022    Vomiting     Wears glasses     Word finding difficulty 11/05/2024

## 2025-03-14 ENCOUNTER — RESULTS FOLLOW-UP (OUTPATIENT)
Dept: NEUROLOGY | Facility: CLINIC | Age: 40
End: 2025-03-14

## 2025-03-18 ENCOUNTER — OFFICE VISIT (OUTPATIENT)
Facility: CLINIC | Age: 40
End: 2025-03-18
Payer: MEDICARE

## 2025-03-18 DIAGNOSIS — I63.9 CEREBROVASCULAR ACCIDENT (CVA), UNSPECIFIED MECHANISM (HCC): Primary | ICD-10-CM

## 2025-03-18 DIAGNOSIS — Z91.81 STATUS POST FALL: ICD-10-CM

## 2025-03-18 DIAGNOSIS — R26.89 OTHER ABNORMALITIES OF GAIT AND MOBILITY: ICD-10-CM

## 2025-03-18 PROCEDURE — 97112 NEUROMUSCULAR REEDUCATION: CPT | Performed by: PHYSICAL THERAPIST

## 2025-03-18 NOTE — PROGRESS NOTES
Daily Note     Today's date: 3/18/2025  Patient name: Mateus Mckeon  : 1985  MRN: [id number]  Referring provider: Dania Sher DO  Dx:   Encounter Diagnosis     ICD-10-CM    1. Cerebrovascular accident (CVA), unspecified mechanism (HCC)  I63.9       2. Other abnormalities of gait and mobility  R26.89       3. Status post fall  Z91.81                                             POC expires Auth Status Total   Visits  Start date  Expiration date PT/OT + Visit Limit? Co-Insurance   2/10/25 Submitted to FD 25, AV pending pending pending BOMN Yes, $0 co-pay                                               Subjective: Patient presents to clinic with cane, no new reports. Arrived 15 min late due to traffic.    Objective: See treatment diary below  *FLUID RESTRICTION- do not offer water*     BP start of session: 145/80 mmHg    Per [employer]: >180/110 mmHg at rest, or >250/115 mmHg with activity, need to stop     NMR/TE: Gait Belt and CGA/CS (2 min on/ 1 min off)     3# ankle weights   - Walking facility dog around clinic on level and uneven surfaces (used mat over turf and then put ankle weights underneath mat)  - Picking up ankle weights off floor while walking facility dog  - Standing on small river rocks placing rings on hoop in facility dog's mouth, 2nd and 3rd sets performed in staggered stance on river rocks      Assessment: Patient tolerated treatment well today. Focused on facility dog activities today due to shortened session and due to pt recently getting a dog of his own and wanting to be more independent walking and caring for him. He is most challenged when walking over uneven surfaces (mat with ankle weights underneath it) which most closely simulates him walking over grass with his dog at home. Will continue to practice. Patient would benefit from continued PT to improve balance, endurance, and reduce fall risk.     Plan: Continue per plan of care.          HEP: walking  on treadmill at home and exercises below        Outcome Measures Initial Eval  1/30/24 PN/DC  9/4/2024 Initial Evaluation  11/18/24 PN  12/24/2024 Re-eval  1/16/2025 Re-eval  2/18/2025   5xSTS 31.25 sec 13.67 sec   no UE defer 16.33 sec,   no UE 21.03 sec, no UE  13.07 sec, no UE   TUG 16.3 sec with rollator    23.6 sec no AD 11.48 sec   no AD 18.91 sec w/ rollator     17.16 sec no AD 12.18 sec   no AD 15.72 sec, no AD 10.87 sec, no AD   10 meter  1.11 m/s self selected pace 0.84 m/s self selected pace  1.05 m/s self selected pace 0.74 m/s 1.14 m/s   MEDRANO  49/56 32/56 40/56 40/56 43/56   FGA 12/30 15/30 defer 12/30 11/30 16/30   6MWT 900 ft 955 ft no AD defer 825 ft no  ft, no  ft, no AD   mCTSIB  FTEO firm  FTEC firm  FTEO foam  FTEC foam   30 sec  30 sec  30 sec  Unable defer 30 sec  30 sec  30 sec  2 sec 30 sec  30 sec  16 sec  2 sec 30 sec  30 sec  17 sec  0 sec   ABC  66.88% 62.5% 57.5% 49.38% 60.63%        Precautions: Check BP's RUE only (fistula LUE), frequent falls  Past Medical History:   Diagnosis Date    Acute kidney injury (HCC)     Ambulates with cane     Anuria     Anxiety     Cellulitis of right elbow 03/31/2021    Chronic kidney disease     COVID-19 10/18/2024    Depression     Diabetes mellitus (HCC)     Diarrhea     Emesis 10/24/2020    End stage renal disease (HCC) 02/11/2018    Formatting of this note might be different from the original. Last Assessment & Plan:  Secondary to DM.  On nightly PD.  Followed by Nephro.  [facility] Formatting of this note might be different from the original. Last Assessment & Plan:  Formatting of this note might be different from the original. 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    Esophagitis 07/21/2015    Falls     Gastroparesis     GERD (gastroesophageal reflux disease)     History of shingles 2010    History of transfusion 02/2018    no adverse  reaction    Hyperlipidemia     Hyperphosphatemia     Hypertension     Hypoglycemia 07/15/2022    Itching     Mastoiditis of right side 07/15/2022    Muscle weakness     general unsteadiness    Obesity (BMI 30.0-34.9) 09/09/2019    Orthostatic hypotension 10/25/2020    Peripheral polyneuropathy 11/20/2019    PONV (postoperative nausea and vomiting) 01/26/2018    Protein-calorie malnutrition (HCC) 11/23/2020    Recurrent peritonitis (HCC) due to peritoneal dialysis catheter 07/31/2020    Retinopathy     Seizures (HCC)     early 2020 - one time    Skin abnormality     some dime size areas where skin was scratched from itching    Sleep apnea     Spontaneous bacterial peritonitis (HCC) 10/19/2020    Squamous cell skin cancer     left temple    Stroke (HCC)     x2 - off balance/no driving/fatigue    Swelling of both lower extremities     Swelling of joint of upper arm, right 04/03/2024    Traumatic onycholysis 07/21/2022    Vomiting     Wears glasses     Word finding difficulty 11/05/2024

## 2025-03-19 ENCOUNTER — OFFICE VISIT (OUTPATIENT)
Age: 40
End: 2025-03-19
Payer: MEDICARE

## 2025-03-19 VITALS
HEIGHT: 71 IN | WEIGHT: 191.2 LBS | HEART RATE: 84 BPM | SYSTOLIC BLOOD PRESSURE: 148 MMHG | DIASTOLIC BLOOD PRESSURE: 88 MMHG | BODY MASS INDEX: 26.77 KG/M2 | TEMPERATURE: 97.9 F | RESPIRATION RATE: 16 BRPM | OXYGEN SATURATION: 97 %

## 2025-03-19 DIAGNOSIS — F33.1 MODERATE EPISODE OF RECURRENT MAJOR DEPRESSIVE DISORDER (HCC): Chronic | ICD-10-CM

## 2025-03-19 DIAGNOSIS — R11.0 NAUSEA: Primary | ICD-10-CM

## 2025-03-19 DIAGNOSIS — I16.0 HYPERTENSIVE URGENCY: ICD-10-CM

## 2025-03-19 PROCEDURE — 99214 OFFICE O/P EST MOD 30 MIN: CPT | Performed by: INTERNAL MEDICINE

## 2025-03-19 PROCEDURE — G2211 COMPLEX E/M VISIT ADD ON: HCPCS | Performed by: INTERNAL MEDICINE

## 2025-03-19 NOTE — ASSESSMENT & PLAN NOTE
-BP improved  -multiple episodes of HTN urgency with med adjustments  -now off doxazosin.  Remains on hydralazine, clonidine, labetalol, nifedipine and olmesartan per Nephro

## 2025-03-19 NOTE — PROGRESS NOTES
Name: Mateus Mckeon      : 1985      MRN: [id number]  Encounter Provider: Dania Sher DO  Encounter Date: 3/19/2025   Encounter department: [facility]  :  Assessment & Plan  Nausea  -has nausea mainly in the evening  -add pepcid in PM, can keep pepcid 20mg in AM       Moderate episode of recurrent major depressive disorder (HCC)  -overall mood is better since getting a new dog  -on escitalopram 20mg daily per psychiatry         Hypertensive urgency  -BP improved  -multiple episodes of HTN urgency with med adjustments  -now off doxazosin.  Remains on hydralazine, clonidine, labetalol, nifedipine and olmesartan per Nephro              History of Present Illness   HPI  He is accompanied by his mother.    Had negative EEG.  Has difficulty finding words chronically and is in speech therapy.    BP has been great.  He got a dog.  He is more motivated, is able to get out of bed without dragging.  He is more active.  He is not snacking in bed as much.  He has more nausea in the evening.    Has R eye blindness.  Has R eye cataract and has to schedule surgery.    Had bleeding issue with L AVF at last HD session.  Applied clamp..    Review of Systems   Constitutional:  Positive for activity change and appetite change.        Weight loss   Eyes:  Positive for visual disturbance.   Respiratory:  Negative for cough, shortness of breath and wheezing.    Cardiovascular:  Negative for chest pain, palpitations and leg swelling.   Gastrointestinal:  Positive for nausea.   Neurological:  Positive for dizziness (chronic). Negative for headaches.   Hematological:  Bruises/bleeds easily.   Psychiatric/Behavioral:  Negative for dysphoric mood. The patient is not nervous/anxious.        Current Outpatient Medications:   •  aspirin (ECOTRIN LOW STRENGTH) 81 mg EC tablet, Take 81 mg by mouth daily, Disp: , Rfl:   •  atorvastatin (LIPITOR) 40 mg tablet, TAKE 1 TABLET BY MOUTH ONCE DAILY IN THE  EVENING, Disp: 90 tablet, Rfl: 1  •  b complex vitamins capsule, Take 1 capsule by mouth daily before lunch , Disp: , Rfl:   •  calcium acetate (PHOSLO) capsule, Take 1 capsule (667 mg total) by mouth 3 (three) times a day, Disp: 90 capsule, Rfl: 0  •  cinacalcet (SENSIPAR) 60 MG tablet, Take 1 tablet (60 mg total) by mouth daily, Disp: 30 tablet, Rfl: 0  •  cloNIDine (CATAPRES) 0.2 mg tablet, Take 1 tablet (0.2 mg total) by mouth every 8 (eight) hours, Disp: 90 tablet, Rfl: 0  •  escitalopram (LEXAPRO) 20 mg tablet, Take 1 tablet (20 mg total) by mouth daily, Disp: 90 tablet, Rfl: 1  •  famotidine (PEPCID) 10 mg tablet, Take 1 tablet (10 mg total) by mouth daily (Patient taking differently: Take 40 mg by mouth daily), Disp: 30 tablet, Rfl: 2  •  gabapentin (NEURONTIN) 100 mg capsule, TAKE 1 CAPSULE BY MOUTH IN THE MORNING AND 2 AT BEDTIME, Disp: 90 capsule, Rfl: 5  •  GLUCAGON EMERGENCY 1 MG injection, , Disp: , Rfl: 3  •  Gvoke HypoPen 1-Pack 1 MG/0.2ML SOAJ, as needed, Disp: , Rfl:   •  hydrALAZINE (APRESOLINE) 100 MG tablet, Take 1 tablet (100 mg total) by mouth every 8 (eight) hours, Disp: 90 tablet, Rfl: 2  •  hydrOXYzine HCL (ATARAX) 10 mg tablet, Take 1 tablet (10 mg total) by mouth every 6 (six) hours as needed for itching or anxiety, Disp: 30 tablet, Rfl: 3  •  Insulin Disposable Pump (Omnipod 5 G6 Intro, Gen 5,) KIT, , Disp: , Rfl:   •  Insulin Disposable Pump (Omnipod 5 G6 Pod, Gen 5,) MISC, , Disp: , Rfl:   •  labetalol (NORMODYNE) 200 mg tablet, Take 2 tablets (400 mg total) by mouth 3 (three) times a day, Disp: 180 tablet, Rfl: 2  •  losartan (COZAAR) 100 MG tablet, Take 1 tablet (100 mg total) by mouth daily at bedtime, Disp: 30 tablet, Rfl: 2  •  NIFEdipine (PROCARDIA XL) 90 mg 24 hr tablet, Take 1 tablet (90 mg total) by mouth 2 (two) times a day, Disp: 60 tablet, Rfl: 0  •  NovoLOG 100 UNIT/ML injection, , Disp: , Rfl:   •  ondansetron (ZOFRAN) 4 mg tablet, Take 1 tablet (4 mg total) by mouth  "every 8 (eight) hours as needed for nausea or vomiting, Disp: 90 tablet, Rfl: 1  •  Patiromer Sorbitex Calcium (VELTASSA PO), Take 5 g by mouth once a week, Disp: , Rfl:   •  SPS 15 GM/60ML suspension, TAKE 60 ML BY MOUTH SINGLE DOSE FOR EMERGENCY USE DURING MISSED HEMODIALYSIS, Disp: , Rfl:   •  torsemide (DEMADEX) 100 mg tablet, Take 1 tablet (100 mg total) by mouth daily Do not start before January 8, 2025., Disp: 30 tablet, Rfl: 2  •  traZODone (DESYREL) 50 mg tablet, Take 1 tablet (50 mg total) by mouth daily at bedtime as needed for sleep, Disp: 30 tablet, Rfl: 1  •  doxazosin (CARDURA) 4 mg tablet, Take 1 tablet (4 mg total) by mouth daily (Patient not taking: Reported on 3/19/2025), Disp: 30 tablet, Rfl: 0  •  hyoscyamine (ANASPAZ,LEVSIN) 0.125 MG tablet, Take 1 tablet (0.125 mg total) by mouth every 4 (four) hours as needed for cramping (Patient not taking: Reported on 3/19/2025), Disp: 30 tablet, Rfl: 0      Objective   /88   Pulse 84   Temp 97.9 °F (36.6 °C)   Resp 16   Ht 5' 11\" (1.803 m)   Wt 86.7 kg (191 lb 3.2 oz)   SpO2 97%   BMI 26.67 kg/m²      Physical Exam  Vitals reviewed.   Constitutional:       General: He is not in acute distress.  Cardiovascular:      Rate and Rhythm: Normal rate and regular rhythm.      Pulses: Normal pulses.      Heart sounds: Normal heart sounds.      Comments: L AVF  Pulmonary:      Effort: Pulmonary effort is normal. No respiratory distress.      Breath sounds: Normal breath sounds. No wheezing.   Abdominal:      General: Bowel sounds are normal. There is no distension.      Palpations: Abdomen is soft.      Tenderness: There is no abdominal tenderness.   Neurological:      Mental Status: He is alert and oriented to person, place, and time.   Psychiatric:         Mood and Affect: Mood normal.             "

## 2025-03-20 ENCOUNTER — APPOINTMENT (OUTPATIENT)
Facility: CLINIC | Age: 40
End: 2025-03-20
Payer: MEDICARE

## 2025-03-24 ENCOUNTER — OFFICE VISIT (OUTPATIENT)
Dept: SPEECH THERAPY | Facility: CLINIC | Age: 40
End: 2025-03-24
Payer: MEDICARE

## 2025-03-24 DIAGNOSIS — I63.9 CEREBROVASCULAR ACCIDENT (CVA), UNSPECIFIED MECHANISM (HCC): ICD-10-CM

## 2025-03-24 DIAGNOSIS — R41.841 COGNITIVE COMMUNICATION DEFICIT: Primary | ICD-10-CM

## 2025-03-24 PROCEDURE — 92507 TX SP LANG VOICE COMM INDIV: CPT | Performed by: SPEECH-LANGUAGE PATHOLOGIST

## 2025-03-24 NOTE — PROGRESS NOTES
Speech Treatment Note    Today's date: 3/24/2025  Patient name: Mateus Mckeon  : 1985  MRN: [id number]  Referring provider: Dania Sher DO  Dx: No diagnosis found.               POC expires Auth Status Total   Visits  Start date  Expiration date PT/OT + Visit Limit? Co-Insurance   24 Medicare  BOMN 3/13/25 N/A No  Yes                                             Visit/Unit Tracking  AUTH Status: BOMN Date 3/13 3/24             Visits  Authed: AFTER 24 VISTS. Used               PN: every 10th visit  Remaining                     Subjective/Behavioral:Paulo participated well with goals.     Short-term goals:  1. Patient will complete attention dependent tasks with less than 2 errors per activity or 90% accuracy.   Paulo completed an entry level attention task with no attention errors. Paulo was tasked sorting cards into three groups while simultaneously tracking two target cards and naming an item in a predetermined category when either card was shown.     2. Improve high level word finding to 80% accuracy  Delayed recall of a word list  generated in goal one was at 75%.  Paulo completed a mid level category matrix puzzle with 75% accuracy, spontaneously. Titrated clueing applied. Accuracy improved to 100% accuracy.     3. Complete executive function dependent tasks with 80% accuracy or higher.    Not addressed     Long-term goals:  -Improve cognitive linguistic function.     Skilled Intervention Statement  Titrated clueing was used throughout the above mentioned activities. Titrated clueing is a skilled, hierarchical intervention which allows for the patient to arrive at a correct response which would otherwise be outside their current capacity. It is initiated by the therapist and is based on clinical analysis of patient's response to the activity presented by the therapist. Under this system, the activity is then adjusted slightly in difficulty in order to maximize performance, which, in turn,  drives recovery or helps maintain stability. Titrated clueing allows for maximum neuro-cognitive stimulation while permitting the patient a meaningful measure of control over the task.     Other:Patient was provided with home exercises/ activies to target goals in plan of care.  Recommendations:Continue with Plan of Care

## 2025-03-25 ENCOUNTER — APPOINTMENT (OUTPATIENT)
Facility: CLINIC | Age: 40
End: 2025-03-25
Payer: MEDICARE

## 2025-03-27 ENCOUNTER — APPOINTMENT (OUTPATIENT)
Facility: CLINIC | Age: 40
End: 2025-03-27
Payer: MEDICARE

## 2025-03-28 NOTE — PROGRESS NOTES
Speech Treatment Note    Today's date: 3/31/2025  Patient name: Mateus Mckeon  : 1985  MRN: [id number]  Referring provider: Dania Sher DO  Dx: No diagnosis found.               POC expires Auth Status Total   Visits  Start date  Expiration date PT/OT + Visit Limit? Co-Insurance   25 Medicare  BOMN 3/13/25 N/A No  Yes                                             Visit/Unit Tracking  AUTH Status: BOMN Date 3/13 3/24 3/31            Visits  Authed: AFTER 24 VISTS. Used 1 2 3            PN: every 10th visit  Remaining                     Subjective/Behavioral: Patient arrived on time and actively participated in all session activities. Patient reported doing well and did not complain of pain.    Short-term goals:  1. Patient will complete attention dependent tasks with less than 2 errors per activity or 90% accuracy.   Paulo completed an entry level attention task with no attention errors (100% accuracy). Paulo was tasked sorting cards into three groups (e.g., red, black, face) while simultaneously tracking two target cards and naming an item in a predetermined category (e.g., musical artists) when either card was shown. In subsequent sessions, task complexity should be increased.     2. Improve high level word finding to 80% accuracy  Paulo achieved 100% accuracy in immediate and delayed recall of word list generated in goal one. Paulo completed a mid level category matrix puzzle with 75% accuracy, spontaneously. Paulo appeared to benefit from semantic cues and demonstrated increased processing time. With the aforementioned support, Paulo achieved 100% accuracy.     3. Complete executive function dependent tasks with 80% accuracy or higher.    Not addressed     Long-term goals:  -Improve cognitive linguistic function.     Skilled Intervention Statement  Titrated clueing was used throughout the above mentioned activities. Titrated clueing is a skilled, hierarchical intervention which allows for the  patient to arrive at a correct response which would otherwise be outside their current capacity. It is initiated by the therapist and is based on clinical analysis of patient's response to the activity presented by the therapist. Under this system, the activity is then adjusted slightly in difficulty in order to maximize performance, which, in turn, drives recovery or helps maintain stability. Titrated clueing allows for maximum neuro-cognitive stimulation while permitting the patient a meaningful measure of control over the task.     Other:Patient was provided with home exercises/ activies to target goals in plan of care.  Recommendations:Continue with Plan of Care

## 2025-03-31 ENCOUNTER — OFFICE VISIT (OUTPATIENT)
Dept: SPEECH THERAPY | Facility: CLINIC | Age: 40
End: 2025-03-31
Payer: MEDICARE

## 2025-03-31 DIAGNOSIS — I63.9 CEREBROVASCULAR ACCIDENT (CVA), UNSPECIFIED MECHANISM (HCC): ICD-10-CM

## 2025-03-31 DIAGNOSIS — R41.841 COGNITIVE COMMUNICATION DEFICIT: Primary | ICD-10-CM

## 2025-03-31 PROCEDURE — 92507 TX SP LANG VOICE COMM INDIV: CPT

## 2025-04-01 ENCOUNTER — APPOINTMENT (OUTPATIENT)
Facility: CLINIC | Age: 40
End: 2025-04-01
Payer: MEDICARE

## 2025-04-03 ENCOUNTER — OFFICE VISIT (OUTPATIENT)
Facility: CLINIC | Age: 40
End: 2025-04-03
Payer: MEDICARE

## 2025-04-03 ENCOUNTER — OFFICE VISIT (OUTPATIENT)
Dept: SPEECH THERAPY | Facility: CLINIC | Age: 40
End: 2025-04-03
Payer: MEDICARE

## 2025-04-03 DIAGNOSIS — I63.89 CEREBROVASCULAR ACCIDENT (CVA) DUE TO OTHER MECHANISM (HCC): Primary | ICD-10-CM

## 2025-04-03 DIAGNOSIS — I63.9 CEREBROVASCULAR ACCIDENT (CVA), UNSPECIFIED MECHANISM (HCC): ICD-10-CM

## 2025-04-03 DIAGNOSIS — R26.89 OTHER ABNORMALITIES OF GAIT AND MOBILITY: ICD-10-CM

## 2025-04-03 DIAGNOSIS — Z91.81 STATUS POST FALL: ICD-10-CM

## 2025-04-03 DIAGNOSIS — Z99.2 END STAGE RENAL DISEASE ON DIALYSIS (HCC): ICD-10-CM

## 2025-04-03 DIAGNOSIS — R53.1 GENERALIZED WEAKNESS: ICD-10-CM

## 2025-04-03 DIAGNOSIS — I63.9 CEREBROVASCULAR ACCIDENT (CVA), UNSPECIFIED MECHANISM (HCC): Primary | ICD-10-CM

## 2025-04-03 DIAGNOSIS — R41.841 COGNITIVE COMMUNICATION DEFICIT: Primary | ICD-10-CM

## 2025-04-03 DIAGNOSIS — N18.6 END STAGE RENAL DISEASE ON DIALYSIS (HCC): ICD-10-CM

## 2025-04-03 PROCEDURE — 92507 TX SP LANG VOICE COMM INDIV: CPT

## 2025-04-03 PROCEDURE — 97112 NEUROMUSCULAR REEDUCATION: CPT | Performed by: OCCUPATIONAL THERAPIST

## 2025-04-03 PROCEDURE — 97530 THERAPEUTIC ACTIVITIES: CPT | Performed by: PHYSICAL THERAPIST

## 2025-04-03 NOTE — PROGRESS NOTES
"Speech Treatment Note    Today's date: 4/3/2025  Patient name: Mateus Mckeon  : 1985  MRN: [id number]  Referring provider: Dania Sher DO  Dx: No diagnosis found.               POC expires Auth Status Total   Visits  Start date  Expiration date PT/OT + Visit Limit? Co-Insurance   25 Medicare  BOMN 3/13/25 N/A No  Yes                                             Visit/Unit Tracking  AUTH Status: BOMN Date 3/13 3/24 3/31 4/3           Visits  Authed: AFTER 24 VISTS. Used 1 2 3 4           PN: every 10th visit  Remaining                     Subjective/Behavioral: Patient arrived on time and attended to all session activities. Patient reported doing well and did not complain of pain.    Short-term goals:  1. Patient will complete attention dependent tasks with less than 2 errors per activity or 90% accuracy.   Paulo was tasked sorting cards into three groups (e.g., red, black, face) while simultaneously tracking two target cards and naming an item in a predetermined category (e.g., board games) when either card was shown. Bill achieved 100% accuracy. Next, Bill was asked to complete the same task but track four target cards while naming items in a different category (e.g., \"food\"). Patient achieved 100% accuracy, yet reported the activity to be \"more challenging.\" Patient reported using a chunking strategy as beneficial when naming foods (e.g., breakfast foods, desserts, snacks).     2. Improve high level word finding to 80% accuracy  Bill achieved 75% accuracy in immediate recall of the word list generated in goal one (e.g., board games) and 88% accuracy in delayed recall of word list generated in goal one (e.g., board games). Patient reported that the following memory strategies were beneficial: association, visualization, and repetition.       Clinician introduced Semantic Feature Analysis (SFA), a word finding strategy where related words within the following categories (group, use, " "location, association, properties, and action) are used to help facilitate accurate retrieval of a target word. Paulo was provided a target word (e.g., \"dog\") and asked to describe the word within the aforementioned categories. Patient reported that visualizing his dog was helpful when completing the activity. Patient then completed the activity with a word of his choice \"trapeze.\" Patient required minimal cueing to think of related words.     3. Complete executive function dependent tasks with 80% accuracy or higher.    Not addressed     Long-term goals:  -Improve cognitive linguistic function.     Skilled Intervention Statement  Titrated clueing was used throughout the above mentioned activities. Titrated clueing is a skilled, hierarchical intervention which allows for the patient to arrive at a correct response which would otherwise be outside their current capacity. It is initiated by the therapist and is based on clinical analysis of patient's response to the activity presented by the therapist. Under this system, the activity is then adjusted slightly in difficulty in order to maximize performance, which, in turn, drives recovery or helps maintain stability. Titrated clueing allows for maximum neuro-cognitive stimulation while permitting the patient a meaningful measure of control over the task.     Other:Patient was provided with home exercises/ activies to target goals in plan of care.  Recommendations:Continue with Plan of Care  "

## 2025-04-03 NOTE — PROGRESS NOTES
PT Re-Evaluation             POC expires Auth Status Total   Visits  Start date  Expiration date PT/OT + Visit Limit? Co-Insurance   2/10/25 Submitted to FD 25, AV pending pending pending SANTI Yes, $0 co-pay                                                                               Today's date: 4/3/2025  Patient name: Mateus Mckeon  : 1985  MRN: [id number]  Referring provider: Dania Sher DO  Dx:   Encounter Diagnosis     ICD-10-CM    1. Cerebrovascular accident (CVA), unspecified mechanism (HCC)  I63.9       2. Other abnormalities of gait and mobility  R26.89       3. Status post fall  Z91.81                       Assessment  Assessment details: Patient is a 39 y.o. Male who is familiar to the balance center presents back following self-discharge after 24. Past medical history significant for ESRD on dialysis, and CVA's (most recent one in 2024). He has attended PT inconsistently from November- December and most recently was hospitalized twice in January due to hypertensive emergency and flu. He returned for re-evaluation  and has been attending PT 2x/week consistently since then. He presents today for re-evaluation and demonstrates significant improvements in outcome measures below (met MDC for all measures except [place]). He has met 4 of 5 short-term goals, and met 3 of 7 long-term goals thus far (no new goals met). He demonstrates improvement in 6MWT, likely from increased walking at home with his new dog. No other clinically significant improvements thus far. He remains low fall risk for TUG. Balance confidence has increased to 62% which is close to cut-off for low fall risk. Pt requesting D/C end of the month which is appropriate given no significant improvements in last month. Also important to note he hasn't been to PT for past 2 weeks due to dr christiansen in [facility]. Pt in agreement to D/C end  of month. Reviewed HEP.       Impairments: Abnormal coordination, Abnormal gait, Abnormal muscle tone, Abnormal or restricted ROM, Activity intolerance, Impaired balance, Impaired physical strength, Lacks appropriate HEP, Poor posture, Poor body mechanics, Pain with function, Safety issue, Weight-bearing intolerance, Abnormal movement, Difficulty understanding, Abnormal muscle firing  Understanding of Dx/Px/POC: Good  Prognosis: Good    Patient verbalized understanding of POC.    Please contact me if you have any questions or recommendations. Thank you for the referral and the opportunity to share in Mtaeus Mckeon's care.      Plan  Plan details: PT 2-3x/week   Patient would benefit from: Skilled PT  Planned modality interventions: Biofeedback, Cryotherapy, TENS, Thermotherapy  Planned therapy interventions: Abdominal trunk stabilization, ADL training, Balance, Balance/WB training, Breathing training, Body mechanics training, Coordination, Functional ROM exercises, Gait training, HEP, Joint Mobilization, Manual Therapy, Ma taping, Motor coordination training, Neuromuscular re-education, Patient education, Postural training, Strengthening, Stretching, Therapeutic activities, Therapeutic exercises, Therapeutic training, Transfer training, Activity modification, Work reintegration  Frequency: 2-3x/wk  Duration in weeks: 12 weeks  Plan of Care beginning date: 2/10/25  Plan of Care expiration date: 12 weeks - 5/5/25  Treatment plan discussed with: Patient         Goals  Short Term Goals (4 weeks):    -Patient will complete 6 MWT to assess cardiovascular endurance and improve tolerance to activity - MET  -Patient will be independent with HEP within 4 weeks - MET  -Patient will be able to ambulate household distances (100ft) or greater with LRAD within 4 week - MET  - Patient will improve 5xSTS score by 2.3 seconds to promote improved LE functional strength needed for ADLs - MET  -Patient will improve ABC to  80% to demonstrate increased balance confidence and reduced fall risk - NOT MET    Long Term Goals (12 weeks):  - Patient will be independent in a comprehensive home exercise program- NOT MET  - Patient will improve gait speed to 1.0 m/s to improve safety with community ambulation - MET  - Patient will improve MEDRANO by 6 points to facilitate return to safe independent ambulation- NOT MET  - Patient will complete DGI to assess dynamic balance and safety with ambulation without AD- NOT MET (performed FGA instead)  - Patient will improve 6 Minute Walk Test score by 190 feet to promote improved cardiovascular endurance- MET  - Patient will report going on walks at least 3 days per week to promote independence and improved cardiovascular endurance- NOT MET  - Patient will report ~75% improvement in falls or near falls to promote safety in his community and independence in his household- MET      Cut off score    All date taken from APTA Neuro Section or Rehab Measures      [place]:  Siva et al., 2018  MDC: 2.7 pts    She et al., 2011  Cut-off score: 45/56    Chronic CVA  < 44/56 high risk for falls (Siva et al., 2018)  < 47.5/56 slow walker status (Jus et al., 2011) 5xSTS: Jean Carlos 2010  MDC: 2.3 sec  Age Norms:  60-69: 11.1 sec  70-79: 12.6 sec  80-89: 14.8 sec    Yamileth 2010, Chronic Stroke  Chronic CVA: 12 sec   TUG  Nubia rodríguez al., 2005  MDC: 2.9 sec    Cut off score:  >13.5 sec community dwelling adults  >32.2 frail elderly  <20 I for basic transfers  >30 dependent on transfers 10 Meter Walk Test:   Talia et al., 2006  Small meaningful change: 0.06 m/s  Substantial meaningful change: 0.14 m/s  MCID: 0.16 m/s    < 0.4 m/s household ambulators  0.4 - 0.8 m/s limited community ambulators  > 0.8 m/s community ambulators   FGA: Deepika et al., 2010  MCID: 4.2 pts  Geriatrics/community < 22/30 fall risk  Geriatrics/community < 20/30 unexplained falls DGI  MDC: vestibular - 4 pts  MDC: geriatric/community - 3  "pts  Falls risk <19/24   6 Minute Walk Test  Tlaia et al., 2006  MDC: 60.98 m (200.01 ft)    Cuco, Verónica, & Ally, 2012  MCID: 34.4 m    Age Norms  60-69: M - 1876 ft   F - 1765 ft  70-79: M - 1729 ft   F - 1545 ft  80-89: M - 1368 ft   F - 1286 ft Modified Joel  0: No increase in tone  1: Catch and release or min resistance at end range  1+: Catch f/b min resistance throughout remainder (< half ROM)  2: Easily moved, but more marked tone throughout most ROM  3: Significant tone, PROM difficult  4: Rigid   MiniBest: Glenda et al., 2013  CVA < 17.5 fall risk Pass (Acute CVA)  MDC: 1.8 points (acute), 3.2 points (chronic)         Subjective    History of Present Illness  Mechanism of injury: Patient is familiar to balance center as he participated with OT and PT services before self-discharging from PT after 09/06/24 secondary to \"feeling ready and past the point of needing PT services\". Patient past medical history significant for CVA (3rd one in January), ESRD which he goes to dialysis for 3x a week, frequent falls, and COVID. During his hiatus from PT patient had 2 falls and COVID with subsequent ~5 day hospitalization on 10/18/24. He had a second hospital visit ~2 weeks ago where he was exhibiting seizure like activity. As per patient, medical professionals did not provide rationale for seizure like activity as imaging returned negative. He was not placed on any new medication and was discharged home. Follow-up with neurology in January; denies any new seizure like activity. He ambulates with his rollator while out in his community and cruises on furniture at home despite recommended use of cane or RW at home. Patient largest complaint is that he feels overtly fatigued as his tolerance to activity has vastly decreased, increased word finding difficulties, and decreased balance. His major goal is to improve tolerance and return to level he was prior to self discharge.     Updated (12/24/2024): Patient reports for " reassessment after hiatus from PT services due to being sick with Covid. He feels his endurance has been worse since being sick.    Updated (1/16/25): Patient has been hospitalized twice within the past month (on 1/4 and 1/9) for hypertensive emergency, and also was diagnosed with the flu.He reports feeling weak since being discharged from hospital. He presents with SPC.     Updated (2/18/25): Pt reports BP's have been better, continues to get dizzy but it's bearable and more of his baseline now. He has eliminated some of his BP pills. Arrives with rollator today; contines to alternate between cane and rollator depending how he feels.     Updated 4/3/25: Pt reports dizziness is stable. Has been busy with dr christiansen in [facility], so hasn't been to PT for past 2 weeks. Having a dog has really helped him mentally, and physically because he is helping to care for the dog.    Primary AD: currently SPC and rollator depending how he is feeling.   (PLOF he mostly used SPC and only used rollator for long distances like going to nephew's soccer game)   Assist level at home: lives with parents who are assisting with ADL's and IADL's, but he is transferring and walking independently.   WC usage: none  PLOF: using SPC mostly, still required some assist from parents for ADL's and IADL's   Patient goals: to walk with SPC again, work on balance, build up walking endurance     Pain  Pain in both feet due to neuropathy     Social Support  Steps to enter house: 2 NIRU  Stairs in house: full flight to 2nd floor (able to perform independently)    Lives in: 2 story home   Lives with: parents    Employment status: disabled   Hand dominance: right    Treatments  Previous treatment: PT, OT  Current treatment: PT, OT  Diagnostic Testing:       Objective   LE MMT   - R Hip Flexion: 4/5  - L Hip Flexion: 4+/5  - R Hip Extension: 4+/5  - L Hip Extension: 4+/5  - R Hip Abduction: 5/5  - L Hip abduction: 5/5  - R Hip adduction: 5/5  - L Hip adduction:  5/5  - R Knee Extension: 4+/5 - L Knee Extension: 4+/5  - R Knee Flexion: 4+/5  - L Knee Flexion: 4+/5  - R Ankle DF: 4/5  - L Ankle DF: 4/5  - R Ankle PF: 4+/5  - L Ankle PF: 4+/5    Sensation  - Light touch: neuropathy both feet up to below knee    Coordination  - Alternating Toe Taps: impaired  - Heel to shin: impaired   - Dysmetria: R side undershooting   - Dysdiadochokinesia: R slowed     Neglect  - R sided: No  - L sided: No    Gait  Wide COLBY, ataxic, decreased step length      Outcome Measures Initial Eval  1/30/24 PN/DC  9/4/2024 Initial Evaluation  11/18/24 PN  12/24/2024 Re-eval  1/16/2025 Re-eval  2/18/2025 Re-eval  4/3/2025   5xSTS 31.25 sec 13.67 sec   no UE defer 16.33 sec,   no UE 21.03 sec, no UE  13.07 sec, no UE 16.44 sec, no UE   TUG 16.3 sec with rollator    23.6 sec no AD 11.48 sec   no AD 18.91 sec w/ rollator     17.16 sec no AD 12.18 sec   no AD 15.72 sec, no AD 10.87 sec, no AD 13.02 sec, no AD   10 meter  1.11 m/s self selected pace 0.84 m/s self selected pace  1.05 m/s self selected pace 0.74 m/s 1.14 m/s    MITCHELL  49/56 32/56 40/56 40/56 43/56    FGA 12/30 15/30 defer 12/30 11/30 16/30    6MWT 900 ft 955 ft no AD defer 825 ft no  ft, no  ft, no AD 1,040 ft, no AD   mCTSIB  FTEO firm  FTEC firm  FTEO foam  FTEC foam   30 sec  30 sec  30 sec  Unable defer 30 sec  30 sec  30 sec  2 sec 30 sec  30 sec  16 sec  2 sec 30 sec  30 sec  17 sec  0 sec 30 sec  30 sec  8 sec  0 sec   ABC  66.88% 62.5% 57.5% 49.38% 60.63% 62.5%        Precautions: Check BP's RUE only (fistula LUE), frequent falls  Past Medical History:   Diagnosis Date    Acute kidney injury (HCC)     Ambulates with cane     Anuria     Anxiety     Cellulitis of right elbow 03/31/2021    Chronic kidney disease     COVID-19 10/18/2024    Depression     Diabetes mellitus (HCC)     Diarrhea     Emesis 10/24/2020    End stage renal disease (HCC) 02/11/2018    Formatting of this note might be different from the original. Last  Assessment & Plan:  Secondary to DM.  On nightly PD.  Followed by Nephro.  Patient considering transplant for kidney and pancreas through [facility] Formatting of this note might be different from the original. Last Assessment & Plan:  Formatting of this note might be different from the original. 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    Esophagitis 07/21/2015    Falls     Gastroparesis     GERD (gastroesophageal reflux disease)     History of shingles 2010    History of transfusion 02/2018    no adverse reaction    Hyperlipidemia     Hyperphosphatemia     Hypertension     Hypoglycemia 07/15/2022    Itching     Mastoiditis of right side 07/15/2022    Muscle weakness     general unsteadiness    Obesity (BMI 30.0-34.9) 09/09/2019    Orthostatic hypotension 10/25/2020    Peripheral polyneuropathy 11/20/2019    PONV (postoperative nausea and vomiting) 01/26/2018    Protein-calorie malnutrition (HCC) 11/23/2020    Recurrent peritonitis (HCC) due to peritoneal dialysis catheter 07/31/2020    Retinopathy     Seizures (HCC)     early 2020 - one time    Skin abnormality     some dime size areas where skin was scratched from itching    Sleep apnea     Spontaneous bacterial peritonitis (HCC) 10/19/2020    Squamous cell skin cancer     left temple    Stroke (HCC)     x2 - off balance/no driving/fatigue    Swelling of both lower extremities     Swelling of joint of upper arm, right 04/03/2024    Traumatic onycholysis 07/21/2022    Vomiting     Wears glasses     Word finding difficulty 11/05/2024

## 2025-04-03 NOTE — PROGRESS NOTES
OT Daily Note     Today's date: 4/3/2025  Patient name: Mateus Mckeon  : 1985  MRN: [id number]  Referring provider: Dania Sher DO  Dx:   Encounter Diagnosis     ICD-10-CM    1. Cerebrovascular accident (CVA) due to other mechanism (HCC)  I63.89       2. Generalized weakness  R53.1       3. End stage renal disease on dialysis (HCC)  N18.6     Z99.2               Start Time: 805  Stop Time: 841  Total time in clinic (min): 36 minutes    PLAN OF CARE START: 2025  PLAN OF CARE END: 2025  FREQUENCY: 2x/wk  PRECAUTIONS: Renal failure, actively going through dialysis; type 1 diabetes; HTN; SAH; seizure (last one was )    Subjective: Pt states he has had a lot of appts related to kidney transplant over the past few weeks.    Objective: See treatment diary below    NMR:  Completed with 2lb wrist weight to RUE:  Large pegboard task with pt picking up 2 pegs at a time, manipulating in hand to place in board. Then removed all pegs with green ring. Addressed dexterity,  strength.  Rubber band puzzle with R hand only focusing on FMC. Completed 3 rounds.  Qbitz x2 rounds with 90* rotation of design to address dexterity and  skills.     Assessment: Pt tolerated session well today. Intermittent rest breaks required. Pt would benefit from continued skilled occupational therapy services to improve fine motor coordination, dexterity, strength, UE function, and functional cognition.    Plan: Continue per plan of care. D/C planned for 4/15.      SHORT TERM GOALS ADDED 24:  Pt will increase sustained attention by completing trail making part A in 35 seconds to complete IADLs NOT MET  Pt will increase divided attention by completing trail making part B in 1 minute 30 seconds to complete IADLs NOT MET  Pt will increase delayed recall and recall 4 /5 items on MOCA to complete IADLs GOAL MET    LONG TERM GOALS ADDED 24:  Pt will increase sustained attention by completing trail making part  A in 38 seconds to complete IADLs  Pt will increase divided attention by completing trail making part B in 1 minute 10 seconds to complete IADLs  Pt will increase delayed recall and recall 5/5 items on MOCA to complete IADLs  Resume right  hand dominance to refined functional assist with all activities of daily living]

## 2025-04-08 ENCOUNTER — OFFICE VISIT (OUTPATIENT)
Facility: CLINIC | Age: 40
End: 2025-04-08
Payer: MEDICARE

## 2025-04-08 DIAGNOSIS — I63.89 CEREBROVASCULAR ACCIDENT (CVA) DUE TO OTHER MECHANISM (HCC): Primary | ICD-10-CM

## 2025-04-08 DIAGNOSIS — N18.6 END STAGE RENAL DISEASE ON DIALYSIS (HCC): ICD-10-CM

## 2025-04-08 DIAGNOSIS — I63.9 CEREBROVASCULAR ACCIDENT (CVA), UNSPECIFIED MECHANISM (HCC): Primary | ICD-10-CM

## 2025-04-08 DIAGNOSIS — Z99.2 END STAGE RENAL DISEASE ON DIALYSIS (HCC): ICD-10-CM

## 2025-04-08 DIAGNOSIS — R26.89 OTHER ABNORMALITIES OF GAIT AND MOBILITY: ICD-10-CM

## 2025-04-08 DIAGNOSIS — R53.1 GENERALIZED WEAKNESS: ICD-10-CM

## 2025-04-08 DIAGNOSIS — Z91.81 STATUS POST FALL: ICD-10-CM

## 2025-04-08 LAB
LEFT EYE DIABETIC RETINOPATHY: POSITIVE
RIGHT EYE DIABETIC RETINOPATHY: POSITIVE

## 2025-04-08 PROCEDURE — 97112 NEUROMUSCULAR REEDUCATION: CPT

## 2025-04-08 PROCEDURE — 97530 THERAPEUTIC ACTIVITIES: CPT | Performed by: OCCUPATIONAL THERAPIST

## 2025-04-08 PROCEDURE — 97112 NEUROMUSCULAR REEDUCATION: CPT | Performed by: OCCUPATIONAL THERAPIST

## 2025-04-08 NOTE — PROGRESS NOTES
OT Daily Note     Today's date: 2025  Patient name: Mateus Mckeon  : 1985  MRN: [id number]  Referring provider: Dania Sher DO  Dx:   Encounter Diagnosis     ICD-10-CM    1. Cerebrovascular accident (CVA) due to other mechanism (HCC)  I63.89       2. Generalized weakness  R53.1       3. End stage renal disease on dialysis (HCC)  N18.6     Z99.2               Start Time: 847  Stop Time: 928  Total time in clinic (min): 41 minutes    PLAN OF CARE START: 2025  PLAN OF CARE END: 2025  FREQUENCY: 2x/wk  PRECAUTIONS: Renal failure, LUE limb alert- actively going through dialysis; type 1 diabetes; HTN; SAH; seizure (last one was 2019)    Subjective: Pt states he feels like he's getting sick. He asked to incorporate cognitive activities for language skills today.    Objective: See treatment diary below    NMR/TA:  Completed with 1lb wrist weight to RUE:  -Letter box worksheet with pt finding words, then spelling them with magnetic bananagrams. Pt picked up all tiles to spell a word, then placed one at a time on tray to incorporate palm-finger translation   -Multimatrix with visual closure card- pt picked up 4-5 colored cubes at a time and turned in hand to locate letter and shape, then stated 5 words for each letter and placed on card.     Assessment: Pt tolerated session well today. Had some difficulty with EF (strategy formation) for letter box worksheet. Pt would benefit from continued skilled occupational therapy services to improve fine motor coordination, dexterity, strength, UE function, and functional cognition.    Plan: Continue per plan of care. D/C planned for 4/15.      SHORT TERM GOALS ADDED 24:  Pt will increase sustained attention by completing trail making part A in 35 seconds to complete IADLs NOT MET  Pt will increase divided attention by completing trail making part B in 1 minute 30 seconds to complete IADLs NOT MET  Pt will increase delayed recall and recall 4  /5 items on MOCA to complete IADLs GOAL MET    LONG TERM GOALS ADDED 2/20/24:  Pt will increase sustained attention by completing trail making part A in 38 seconds to complete IADLs  Pt will increase divided attention by completing trail making part B in 1 minute 10 seconds to complete IADLs  Pt will increase delayed recall and recall 5/5 items on MOCA to complete IADLs  Resume right  hand dominance to refined functional assist with all activities of daily living]

## 2025-04-08 NOTE — PROGRESS NOTES
Daily Note     Today's date: 2025  Patient name: Mateus Mckeon  : 1985  MRN: [id number]  Referring provider: Dania Sher DO  Dx:   Encounter Diagnosis     ICD-10-CM    1. Cerebrovascular accident (CVA), unspecified mechanism (HCC)  I63.9       2. Other abnormalities of gait and mobility  R26.89       3. Status post fall  Z91.81             POC expires Auth Status Total   Visits  Start date  Expiration date PT/OT + Visit Limit? Co-Insurance   2/10/25 Submitted to FD 25, AV pending pending pending BOMN Yes, $0 co-pay                                           Treatment session performed by JANNETH Lawson under the direct supervision and guidance of this PT. The patient case was discussed to assure appropriate continuation of care and safety; documentation reviewed by this PT as well. -Toñito Garcia, PT, DPT     Subjective: Patient presents to clinic with cane, no new reports.     Objective: See treatment diary below  *FLUID RESTRICTION- do not offer water*     BP start of session: 160/82 mmHg seated R arm 1st attempt, ~ 2 min rest for 2nd attempt 142/80 mmHg seated R am.    Per [employer]: >180/110 mmHg at rest, or >250/115 mmHg with activity, need to stop     NMR/TE: Gait Belt and CGA/CS (2 min on/ 1 min off)     #3 ankle weights  - FWD hurdles 6-9'' 0 UE, 1 set of 4 cycles  - LAT hurdles 6-9'' 0 UE,   - Step-ups to medium river rocks, 0 UE, 2 min  - Standing on foam pad with vertical 1 min and horizontal head turns 2 min   - Standing on foam pad w/ #5 TT trunk rotations x 1 min    Assessment:   Patient tolerated treatment well today with continued focus on balance and endurance. Patient able to perform all exercises with CGAx1 for safety and cueing. Patient was most challenged with standing on foam and doing head turns, felt nauseous upon completion, symptoms reduced after prolonged rest break. Patient demonstrates proper balance strategies utilizing hips and ankles  for correction, no LOB throughout session. Patient would benefit from continued PT to improve balance, endurance and reduce fall risk.     Plan: Continue per plan of care.          HEP: walking on treadmill at home and exercises below        Outcome Measures Initial Eval  1/30/24 PN/DC  9/4/2024 Initial Evaluation  11/18/24 PN  12/24/2024 Re-eval  1/16/2025 Re-eval  2/18/2025   5xSTS 31.25 sec 13.67 sec   no UE defer 16.33 sec,   no UE 21.03 sec, no UE  13.07 sec, no UE   TUG 16.3 sec with rollator    23.6 sec no AD 11.48 sec   no AD 18.91 sec w/ rollator     17.16 sec no AD 12.18 sec   no AD 15.72 sec, no AD 10.87 sec, no AD   10 meter  1.11 m/s self selected pace 0.84 m/s self selected pace  1.05 m/s self selected pace 0.74 m/s 1.14 m/s   MEDRANO  49/56 32/56 40/56 40/56 43/56   FGA 12/30 15/30 defer 12/30 11/30 16/30   6MWT 900 ft 955 ft no AD defer 825 ft no  ft, no  ft, no AD   mCTSIB  FTEO firm  FTEC firm  FTEO foam  FTEC foam   30 sec  30 sec  30 sec  Unable defer 30 sec  30 sec  30 sec  2 sec 30 sec  30 sec  16 sec  2 sec 30 sec  30 sec  17 sec  0 sec   ABC  66.88% 62.5% 57.5% 49.38% 60.63%        Precautions: Check BP's RUE only (fistula LUE), frequent falls  Past Medical History:   Diagnosis Date    Acute kidney injury (HCC)     Ambulates with cane     Anuria     Anxiety     Cellulitis of right elbow 03/31/2021    Chronic kidney disease     COVID-19 10/18/2024    Depression     Diabetes mellitus (HCC)     Diarrhea     Emesis 10/24/2020    End stage renal disease (HCC) 02/11/2018    Formatting of this note might be different from the original. Last Assessment & Plan:  Secondary to DM.  On nightly PD.  Followed by Nephro.  Patient considering transplant for kidney and pancreas through [facility] Formatting of this note might be different from the original. Last Assessment & Plan:  Formatting of this note might be different from the original. Lab Results  Component Value Date   EGFR     Eosinophilic  leukocytosis 11/04/2020    Esophagitis 07/21/2015    Falls     Gastroparesis     GERD (gastroesophageal reflux disease)     History of shingles 2010    History of transfusion 02/2018    no adverse reaction    Hyperlipidemia     Hyperphosphatemia     Hypertension     Hypoglycemia 07/15/2022    Itching     Mastoiditis of right side 07/15/2022    Muscle weakness     general unsteadiness    Obesity (BMI 30.0-34.9) 09/09/2019    Orthostatic hypotension 10/25/2020    Peripheral polyneuropathy 11/20/2019    PONV (postoperative nausea and vomiting) 01/26/2018    Protein-calorie malnutrition (HCC) 11/23/2020    Recurrent peritonitis (HCC) due to peritoneal dialysis catheter 07/31/2020    Retinopathy     Seizures (HCC)     early 2020 - one time    Skin abnormality     some dime size areas where skin was scratched from itching    Sleep apnea     Spontaneous bacterial peritonitis (HCC) 10/19/2020    Squamous cell skin cancer     left temple    Stroke (HCC)     x2 - off balance/no driving/fatigue    Swelling of both lower extremities     Swelling of joint of upper arm, right 04/03/2024    Traumatic onycholysis 07/21/2022    Vomiting     Wears glasses     Word finding difficulty 11/05/2024

## 2025-04-10 NOTE — PROGRESS NOTES
Speech Treatment Note    Today's date: 2025  Patient name: Mateus Mckeon  : 1985  MRN: [id number]  Referring provider: Dania Sher DO  Dx:   Encounter Diagnosis     ICD-10-CM    1. Cognitive communication deficit  R41.841       2. Cerebrovascular accident (CVA), unspecified mechanism (HCC)  I63.9           Start Time: 09  Stop Time: 1015  Total time in clinic (min): 45 minutes    POC expires Auth Status Total   Visits  Start date  Expiration date PT/OT + Visit Limit? Co-Insurance   25 Medicare  BOMN 3/13/25 N/A No  Yes                                             Visit/Unit Tracking  AUTH Status: BOMN Date 3/13 3/24 3/31 4/3 4/14          Visits  Authed: AFTER 24 VISTS. Used 1 2 3 4 5          PN: every 10th visit  Remaining                     Subjective/Behavioral: Patient arrived on time and attended to all session activities. Patient reported doing well and did not complain of pain. Patient reported scheduling cataract surgery for . Patient inquired about phone apps to download to help practice skills addressed in therapy. See comments below regarding advice provided.    Short-term goals:  1. Patient will complete attention dependent tasks with less than 2 errors per activity or 90% accuracy.   Paulo was tasked sorting cards into three groups (e.g., red, black, face) while simultaneously tracking four target cards and naming an item in a predetermined category (e.g., famous people) when either card was shown. Paulo made 1 attention error and achieved 98% accuracy (53/54 cards). Patient required increased processing time. Patient appeared to benefit from using an association strategy (e.g., naming political figures, singers, and civil rights figures). In subsequent sessions, clinician should increase activity complexity.       2. Improve high level word finding to 80% accuracy  Paulo achieved 100% accuracy in immediate recall of the word list generated in goal one (e.g.,  famous people) and 87% accuracy (14/16 opportunities) in delayed recall of word list generated in goal one (e.g., famous people). Patient reported that the following memory strategies were beneficial: association, visualization, and repetition.     3. Complete executive function dependent tasks with 80% accuracy or higher.      Reasoning / Deduction Puzzle Vehicle & City/  & Place: 100% acc (I). Patient completed two versions - one with modified clues and another in the original format. In subsequent sessions, puzzles of greater complexity should be completed.    Long-term goals:  -Improve cognitive linguistic function.     Skilled Intervention Statement  Titrated clueing was used throughout the above mentioned activities. Titrated clueing is a skilled, hierarchical intervention which allows for the patient to arrive at a correct response which would otherwise be outside their current capacity. It is initiated by the therapist and is based on clinical analysis of patient's response to the activity presented by the therapist. Under this system, the activity is then adjusted slightly in difficulty in order to maximize performance, which, in turn, drives recovery or helps maintain stability. Titrated clueing allows for maximum neuro-cognitive stimulation while permitting the patient a meaningful measure of control over the task.     Other:Patient was provided with home exercises/ activies to target goals in plan of care. Clinician recommended various phone applications for patient use to practice word finding and executive functioning at home (e.g., [employer], [employer]).   Recommendations:Continue with Plan of Care

## 2025-04-12 DIAGNOSIS — E10.42 DIABETIC POLYNEUROPATHY ASSOCIATED WITH TYPE 1 DIABETES MELLITUS (HCC): ICD-10-CM

## 2025-04-12 DIAGNOSIS — R20.0 RIGHT UPPER EXTREMITY NUMBNESS: ICD-10-CM

## 2025-04-14 ENCOUNTER — OFFICE VISIT (OUTPATIENT)
Dept: SPEECH THERAPY | Facility: CLINIC | Age: 40
End: 2025-04-14
Payer: MEDICARE

## 2025-04-14 DIAGNOSIS — R41.841 COGNITIVE COMMUNICATION DEFICIT: Primary | ICD-10-CM

## 2025-04-14 DIAGNOSIS — I63.9 CEREBROVASCULAR ACCIDENT (CVA), UNSPECIFIED MECHANISM (HCC): ICD-10-CM

## 2025-04-14 PROCEDURE — 92507 TX SP LANG VOICE COMM INDIV: CPT

## 2025-04-14 RX ORDER — GABAPENTIN 100 MG/1
CAPSULE ORAL
Qty: 90 CAPSULE | Refills: 0 | Status: SHIPPED | OUTPATIENT
Start: 2025-04-14

## 2025-04-15 ENCOUNTER — OFFICE VISIT (OUTPATIENT)
Facility: CLINIC | Age: 40
End: 2025-04-15
Attending: INTERNAL MEDICINE
Payer: MEDICARE

## 2025-04-15 ENCOUNTER — OFFICE VISIT (OUTPATIENT)
Facility: CLINIC | Age: 40
End: 2025-04-15
Payer: MEDICARE

## 2025-04-15 DIAGNOSIS — R53.1 GENERALIZED WEAKNESS: ICD-10-CM

## 2025-04-15 DIAGNOSIS — Z99.2 END STAGE RENAL DISEASE ON DIALYSIS (HCC): ICD-10-CM

## 2025-04-15 DIAGNOSIS — N18.6 END STAGE RENAL DISEASE ON DIALYSIS (HCC): ICD-10-CM

## 2025-04-15 DIAGNOSIS — R26.89 OTHER ABNORMALITIES OF GAIT AND MOBILITY: ICD-10-CM

## 2025-04-15 DIAGNOSIS — Z91.81 STATUS POST FALL: ICD-10-CM

## 2025-04-15 DIAGNOSIS — I63.89 CEREBROVASCULAR ACCIDENT (CVA) DUE TO OTHER MECHANISM (HCC): Primary | ICD-10-CM

## 2025-04-15 DIAGNOSIS — I63.9 CEREBROVASCULAR ACCIDENT (CVA), UNSPECIFIED MECHANISM (HCC): Primary | ICD-10-CM

## 2025-04-15 PROCEDURE — 97112 NEUROMUSCULAR REEDUCATION: CPT | Performed by: PHYSICAL THERAPIST

## 2025-04-15 PROCEDURE — 97530 THERAPEUTIC ACTIVITIES: CPT | Performed by: OCCUPATIONAL THERAPIST

## 2025-04-15 NOTE — PROGRESS NOTES
Daily Note / Discharge    Today's date: 4/15/2025  Patient name: Mateus Mckeon  : 1985  MRN: [id number]  Referring provider: Dania Sher DO  Dx:   Encounter Diagnosis     ICD-10-CM    1. Cerebrovascular accident (CVA), unspecified mechanism (HCC)  I63.9       2. Other abnormalities of gait and mobility  R26.89       3. Status post fall  Z91.81               POC expires Auth Status Total   Visits  Start date  Expiration date PT/OT + Visit Limit? Co-Insurance   2/10/25 Submitted to FD 25, AV pending pending pending BOMN Yes, $0 co-pay                                         Subjective: Patient thinks today is his last day of OT, and would like to make it last day of PT too (instead of discharging at the end of the month).    Objective: See treatment diary below  *FLUID RESTRICTION- do not offer water*    Per [employer]: >180/110 mmHg at rest, or >250/115 mmHg with activity, need to stop    NMR/TE: Gait Belt and CGA/CS (2 min on/ 1 min off)     #3 ankle weights  - FWD hurdles 6-9'' 0 UE, 2 min  - LAT hurdles 6-9'' 0 UE, 2 min  - Step-ups to medium river rocks, 0 UE, 2 min  - Standing on foam pad with vertical 1 min and horizontal head turns 2 min   - Standing on foam pad w/ #5 TT trunk rotations x 1 min  - Walking over mat with river rocks and ankle weights underneath it  - Walking facility dog through clinic     Assessment:   Patient tolerated treatment well today with continued focus on balance and endurance. Overall he was able to negotiate hurdles at CS level, instead of CG. He had most instability during step ups onto river rocks and walking over uneven mat surface. Discussed D/C at pt's last re-eval and he is in agreement to bump up D/C to today due to being D/C from   OT today as well. Please see goals from re-eval on 4/3/25 for goal status. Encouraged pt to return to PT if he feels his balance is declining; pt in agreement.     Plan: Discharge today.        HEP: walking  on treadmill at home and exercises below        Outcome Measures Initial Eval  1/30/24 PN/DC  9/4/2024 Initial Evaluation  11/18/24 PN  12/24/2024 Re-eval  1/16/2025 Re-eval  2/18/2025   5xSTS 31.25 sec 13.67 sec   no UE defer 16.33 sec,   no UE 21.03 sec, no UE  13.07 sec, no UE   TUG 16.3 sec with rollator    23.6 sec no AD 11.48 sec   no AD 18.91 sec w/ rollator     17.16 sec no AD 12.18 sec   no AD 15.72 sec, no AD 10.87 sec, no AD   10 meter  1.11 m/s self selected pace 0.84 m/s self selected pace  1.05 m/s self selected pace 0.74 m/s 1.14 m/s   MEDRANO  49/56 32/56 40/56 40/56 43/56   FGA 12/30 15/30 defer 12/30 11/30 16/30   6MWT 900 ft 955 ft no AD defer 825 ft no  ft, no  ft, no AD   mCTSIB  FTEO firm  FTEC firm  FTEO foam  FTEC foam   30 sec  30 sec  30 sec  Unable defer 30 sec  30 sec  30 sec  2 sec 30 sec  30 sec  16 sec  2 sec 30 sec  30 sec  17 sec  0 sec   ABC  66.88% 62.5% 57.5% 49.38% 60.63%        Precautions: Check BP's RUE only (fistula LUE), frequent falls  Past Medical History:   Diagnosis Date    Acute kidney injury (HCC)     Ambulates with cane     Anuria     Anxiety     Cellulitis of right elbow 03/31/2021    Chronic kidney disease     COVID-19 10/18/2024    Depression     Diabetes mellitus (HCC)     Diarrhea     Emesis 10/24/2020    End stage renal disease (HCC) 02/11/2018    Formatting of this note might be different from the original. Last Assessment & Plan:  Secondary to DM.  On nightly PD.  Followed by [facility].  [facility] Formatting of this note might be different from the original. Last Assessment & Plan:  Formatting of this note might be different from the original. 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    Esophagitis 07/21/2015    Falls     Gastroparesis     GERD (gastroesophageal reflux disease)     History of shingles 2010    History of transfusion 02/2018    no adverse  reaction    Hyperlipidemia     Hyperphosphatemia     Hypertension     Hypoglycemia 07/15/2022    Itching     Mastoiditis of right side 07/15/2022    Muscle weakness     general unsteadiness    Obesity (BMI 30.0-34.9) 09/09/2019    Orthostatic hypotension 10/25/2020    Peripheral polyneuropathy 11/20/2019    PONV (postoperative nausea and vomiting) 01/26/2018    Protein-calorie malnutrition (HCC) 11/23/2020    Recurrent peritonitis (HCC) due to peritoneal dialysis catheter 07/31/2020    Retinopathy     Seizures (HCC)     early 2020 - one time    Skin abnormality     some dime size areas where skin was scratched from itching    Sleep apnea     Spontaneous bacterial peritonitis (HCC) 10/19/2020    Squamous cell skin cancer     left temple    Stroke (HCC)     x2 - off balance/no driving/fatigue    Swelling of both lower extremities     Swelling of joint of upper arm, right 04/03/2024    Traumatic onycholysis 07/21/2022    Vomiting     Wears glasses     Word finding difficulty 11/05/2024

## 2025-04-15 NOTE — PROGRESS NOTES
OT Discharge    Today's date: 4/15/2025  Patient name: Mateus Mckeon  : 1985  MRN: [id number]  Referring provider: Dania Sher DO  Dx:   Encounter Diagnosis     ICD-10-CM    1. Cerebrovascular accident (CVA) due to other mechanism (HCC)  I63.89       2. Generalized weakness  R53.1       3. End stage renal disease on dialysis (HCC)  N18.6     Z99.2                          PLAN OF CARE START: 2025  PLAN OF CARE END: 2025  FREQUENCY: 2x/wk  PRECAUTIONS: Renal failure, LUE limb alert- actively going through dialysis; type 1 diabetes; HTN; SAH; seizure (last one was 2019)    Subjective: Pt states he feels like he's getting sick. He asked to incorporate cognitive activities for language skills today.    Objective: See treatment diary below    TA:  Pixy cubes with alternatng attention component of stating synonyms for happy, angry, sad, good x1 round, and 8 words per letter of the alphabet for 1 round. Required modA for naming component.    Assessment: Pt tolerated session well today. Had some difficulty with EF (strategy formation) for letter box worksheet. Pt would benefit from continued skilled occupational therapy services to improve fine motor coordination, dexterity, strength, UE function, and functional cognition.    Plan: D/C from OT services      SHORT TERM GOALS ADDED 24:  Pt will increase sustained attention by completing trail making part A in 35 seconds to complete IADLs NOT MET  Pt will increase divided attention by completing trail making part B in 1 minute 30 seconds to complete IADLs NOT MET  Pt will increase delayed recall and recall 4 /5 items on MOCA to complete IADLs GOAL MET    LONG TERM GOALS ADDED 24:  Pt will increase sustained attention by completing trail making part A in 38 seconds to complete IADLs  Pt will increase divided attention by completing trail making part B in 1 minute 10 seconds to complete IADLs  Pt will increase delayed recall and recall  5/5 items on MOCA to complete IADLs  Resume right  hand dominance to refined functional assist with all activities of daily living]

## 2025-04-16 NOTE — PROGRESS NOTES
Speech Treatment Note    Today's date: 2025  Patient name: Mateus Mckeon  : 1985  MRN: [id number]  Referring provider: Dania Sher DO  Dx:   Encounter Diagnosis     ICD-10-CM    1. Cognitive communication deficit  R41.841       2. Cerebrovascular accident (CVA), unspecified mechanism (HCC)  I63.9                        POC expires Auth Status Total   Visits  Start date  Expiration date PT/OT + Visit Limit? Co-Insurance   25 Medicare  BOMN 3/13/25 N/A No  Yes                                             Visit/Unit Tracking  AUTH Status: BOMN Date 3/13 3/24 3/31 4/3 4/14 4/17         Visits  Authed: AFTER 24 VISTS. Used 1 2 3 4 5 6         PN: every 10th visit  Remaining                     Subjective/Behavioral: Patient arrived on time and attended to all session activities. Patient appeared to be doing well and did not complain of pain.     Short-term goals:  1. Patient will complete attention dependent tasks with less than 2 errors per activity or 90% accuracy.   Paulo was tasked sorting cards into three groups (e.g., red, black, face) while simultaneously tracking four target cards and naming an item in two predetermined categories (e.g., things that are green and things that are cold) when either card was shown. Paulo made 1 attention error. Patient required increased processing time. Patient appeared to benefit from using an association strategy (e.g., naming freezer items, naming outdoor items ).       2. Improve high level word finding to 80% accuracy  Bill achieved 93% accuracy (15/16 words) in immediate recall of the word list generated in goal one (e.g., things that are cold and things that are green). Patient reported that the following memory strategies were beneficial: association, visualization, and repetition.     Patient completed a mid- level category matrix to promote appropriate word finding strategies and skill. Patient achieved 80% accuracy, independently. Patient  appeared to benefit from sporadic semantic cues. Patient required increased processing time and at times was asked to provide alternative answers to assist in expanding vocabulary. Home assignment was provided to promote carryover.     3. Complete executive function dependent tasks with 80% accuracy or higher.   Not addressed.     Long-term goals:  -Improve cognitive linguistic function.     Skilled Intervention Statement  Titrated clueing was used throughout the above mentioned activities. Titrated clueing is a skilled, hierarchical intervention which allows for the patient to arrive at a correct response which would otherwise be outside their current capacity. It is initiated by the therapist and is based on clinical analysis of patient's response to the activity presented by the therapist. Under this system, the activity is then adjusted slightly in difficulty in order to maximize performance, which, in turn, drives recovery or helps maintain stability. Titrated clueing allows for maximum neuro-cognitive stimulation while permitting the patient a meaningful measure of control over the task.     Other:Patient was provided with home exercises/ activies to target goals in plan of care.   Recommendations:Continue with Plan of Care

## 2025-04-17 ENCOUNTER — APPOINTMENT (OUTPATIENT)
Facility: CLINIC | Age: 40
End: 2025-04-17
Payer: MEDICARE

## 2025-04-17 ENCOUNTER — OFFICE VISIT (OUTPATIENT)
Dept: SPEECH THERAPY | Facility: CLINIC | Age: 40
End: 2025-04-17
Payer: MEDICARE

## 2025-04-17 DIAGNOSIS — R41.841 COGNITIVE COMMUNICATION DEFICIT: Primary | ICD-10-CM

## 2025-04-17 DIAGNOSIS — I63.9 CEREBROVASCULAR ACCIDENT (CVA), UNSPECIFIED MECHANISM (HCC): ICD-10-CM

## 2025-04-17 PROCEDURE — 92507 TX SP LANG VOICE COMM INDIV: CPT

## 2025-04-18 NOTE — PROGRESS NOTES
Speech Treatment Note    Today's date: 2025  Patient name: Mateus Mckeon  : 1985  MRN: [id number]  Referring provider: Dania Sher DO  Dx:   Encounter Diagnosis     ICD-10-CM    1. Cognitive communication deficit  R41.841       2. Cerebrovascular accident (CVA), unspecified mechanism (HCC)  I63.9               Start Time: 09  Stop Time: 1015  Total time in clinic (min): 45 minutes    POC expires Auth Status Total   Visits  Start date  Expiration date PT/OT + Visit Limit? Co-Insurance   25 Medicare  BOMN 3/13/25 N/A No  Yes                                             Visit/Unit Tracking  AUTH Status: BOMN Date 3/13 3/24 3/31 4/3 4/14 4/17 4/21        Visits  Authed: AFTER 24 VISTS. Used 1 2 3 4 5 6 7        PN: every 10th visit  Remaining                     Subjective/Behavioral: Patient arrived on time and attended to all session activities. Patient appeared to be doing well and did not complain of pain.     Short-term goals:  1. Patient will complete attention dependent tasks with less than 2 errors per activity or 90% accuracy.     Attention / Alternating Attention: Letter of Alphabet & Corresponding Numbers: 96% acc (I). Patient appeared to benefit from increased processing time.    Attention / Alternating Attention: Alternating Male & Female Names for each letter of the alphabet: 96% acc (I). Patient appeared to benefit from increased processing time. Patient demonstrated high accuracy in lexical retrieval of relevant male and female names. Patient reported naming individuals in his life assisted in word recall.       2. Improve high level word finding to 80% accuracy  Paulo was tasked with naming 10 Kur[employer] Jose songs and achieved 100% accuracy (I). Patient required increased processing time and appeared to benefit from naming familiar and beloved songs. Paulo achieved 80% accuracy (I), increased to 100% accuracy with minimal clinician support in the form of semantic cues in  immediate recall of song list and 90% accuracy in delayed recall.       3. Complete executive function dependent tasks with 80% accuracy or higher.   Paulo completed a mid-to-high level deductive reasoning puzzle with 100% accuracy (I). Prior to task completion, patient was instructed to start with clues that provided definitive information and cross out clues once completed. Patient appeared to benefit from crossing out clues and required increased processing time.         Long-term goals:  -Improve cognitive linguistic function.     Skilled Intervention Statement  Titrated clueing was used throughout the above mentioned activities. Titrated clueing is a skilled, hierarchical intervention which allows for the patient to arrive at a correct response which would otherwise be outside their current capacity. It is initiated by the therapist and is based on clinical analysis of patient's response to the activity presented by the therapist. Under this system, the activity is then adjusted slightly in difficulty in order to maximize performance, which, in turn, drives recovery or helps maintain stability. Titrated clueing allows for maximum neuro-cognitive stimulation while permitting the patient a meaningful measure of control over the task.     Other:Patient was provided with home exercises/ activies to target goals in plan of care.   Recommendations:Continue with Plan of Care

## 2025-04-21 ENCOUNTER — OFFICE VISIT (OUTPATIENT)
Dept: SPEECH THERAPY | Facility: CLINIC | Age: 40
End: 2025-04-21
Payer: MEDICARE

## 2025-04-21 DIAGNOSIS — I63.9 CEREBROVASCULAR ACCIDENT (CVA), UNSPECIFIED MECHANISM (HCC): ICD-10-CM

## 2025-04-21 DIAGNOSIS — R41.841 COGNITIVE COMMUNICATION DEFICIT: Primary | ICD-10-CM

## 2025-04-21 PROCEDURE — 92507 TX SP LANG VOICE COMM INDIV: CPT

## 2025-04-22 ENCOUNTER — APPOINTMENT (OUTPATIENT)
Facility: CLINIC | Age: 40
End: 2025-04-22
Payer: MEDICARE

## 2025-04-23 NOTE — PROGRESS NOTES
Speech Treatment Note    Today's date: 2025  Patient name: Mateus Mckeon  : 1985  MRN: [id number]  Referring provider: Dania Sher DO  Dx:   Encounter Diagnosis     ICD-10-CM    1. Cognitive communication deficit  R41.841       2. Cerebrovascular accident (CVA), unspecified mechanism (HCC)  I63.9                            POC expires Auth Status Total   Visits  Start date  Expiration date PT/OT + Visit Limit? Co-Insurance   25 Medicare  BOMN 3/13/25 N/A No  Yes                                             Visit/Unit Tracking  AUTH Status: BOMN Date 3/13 3/24 3/31 4/3 4/14 4/17 4/21 4/24       Visits  Authed: AFTER 24 VISTS. Used 1 2 3 4 5 6 7 8       PN: every 10th visit  Remaining                     Subjective/Behavioral: Patient arrived on time and attended to all session activities. Patient appeared to be doing well and did not complain of pain.     Short-term goals:  1. Patient will complete attention dependent tasks with less than 2 errors per activity or 90% accuracy.     Paulo was tasked sorting cards into three groups (e.g., red, black, face) while simultaneously tracking 2 target cards and naming an item in a predetermined category (e.g., musical artists) when either card was shown. Paulo made 0 attention errors. Patient appeared to benefit from using an association strategy (e.g., naming musical artists of interest). Patient was asked to memorize the list of eight musical artists and not write the named items down, thus increasing task complexity.      2. Improve high level word finding to 80% accuracy  Paulo achieved 87% accuracy (I), increased to 100% accuracy with minimal clinician support in the form of semantic cues in immediate recall of musical artist list in goal one and 100% accuracy in delayed recall. Patient demonstrated quicker recall time. Task complexity should be increased in previous sessions.    Paulo began a mid-to-high level category matrix puzzle (e.g.,  categories: things to ride, things that are warm, feelings, and things to wear on your head). Patient achieved 75% accuracy, independently (9/12 opportunities). Patient demonstrated increased processing time. Patient's accuracy improved following min-mod clinician cues in the form of semantic cues. Puzzle should be completed in next session.      3. Complete executive function dependent tasks with 80% accuracy or higher.   To target executive functioning and reasoning, Paulo was tasked with sorting a group of 16 words into four categories based off similarities than verbalizing the overlying connection (e.g., firetruck, gaudencio, stop sign, apple -->things that are red). Paulo completed two separate connections puzzles. In the first connections activity, Bill sorted and verbalized the appropriate connection with 100% accuracy, independently. In the second connections activity, Bill sorted and verbalized the appropriate connection with 25% accuracy, independently. Patient's accuracy improved to 75% accuracy with min-mod clinician cues in the form of semantic cues. While patient achieved a low-level of accuracy independently, patient demonstrated appropriate strategies such as starting with words he knew went together. Exercise should be returned to in subsequent sessions.          Long-term goals:  -Improve cognitive linguistic function.     Skilled Intervention Statement  Titrated clueing was used throughout the above mentioned activities. Titrated clueing is a skilled, hierarchical intervention which allows for the patient to arrive at a correct response which would otherwise be outside their current capacity. It is initiated by the therapist and is based on clinical analysis of patient's response to the activity presented by the therapist. Under this system, the activity is then adjusted slightly in difficulty in order to maximize performance, which, in turn, drives recovery or helps maintain stability. Titrated clueing  allows for maximum neuro-cognitive stimulation while permitting the patient a meaningful measure of control over the task.     Other:Patient was provided with home exercises/ activies to target goals in plan of care.   Recommendations:Continue with Plan of Care

## 2025-04-24 ENCOUNTER — OFFICE VISIT (OUTPATIENT)
Dept: SPEECH THERAPY | Facility: CLINIC | Age: 40
End: 2025-04-24
Payer: MEDICARE

## 2025-04-24 ENCOUNTER — APPOINTMENT (OUTPATIENT)
Facility: CLINIC | Age: 40
End: 2025-04-24
Payer: MEDICARE

## 2025-04-24 DIAGNOSIS — I63.9 CEREBROVASCULAR ACCIDENT (CVA), UNSPECIFIED MECHANISM (HCC): ICD-10-CM

## 2025-04-24 DIAGNOSIS — R41.841 COGNITIVE COMMUNICATION DEFICIT: Primary | ICD-10-CM

## 2025-04-24 PROCEDURE — 92507 TX SP LANG VOICE COMM INDIV: CPT

## 2025-04-29 ENCOUNTER — APPOINTMENT (OUTPATIENT)
Facility: CLINIC | Age: 40
End: 2025-04-29
Payer: MEDICARE

## 2025-04-29 ENCOUNTER — OFFICE VISIT (OUTPATIENT)
Dept: NEPHROLOGY | Facility: CLINIC | Age: 40
End: 2025-04-29
Payer: MEDICARE

## 2025-04-29 VITALS
SYSTOLIC BLOOD PRESSURE: 152 MMHG | DIASTOLIC BLOOD PRESSURE: 90 MMHG | WEIGHT: 195 LBS | HEART RATE: 76 BPM | HEIGHT: 71 IN | BODY MASS INDEX: 27.3 KG/M2 | OXYGEN SATURATION: 96 %

## 2025-04-29 DIAGNOSIS — Z01.818 PRE-TRANSPLANT EVALUATION FOR ESRD (END STAGE RENAL DISEASE): Primary | ICD-10-CM

## 2025-04-29 PROCEDURE — 99214 OFFICE O/P EST MOD 30 MIN: CPT | Performed by: INTERNAL MEDICINE

## 2025-04-29 RX ORDER — OLMESARTAN MEDOXOMIL 40 MG/1
40 TABLET ORAL DAILY
COMMUNITY

## 2025-04-29 NOTE — PROGRESS NOTES
Name: Mateus Mckeon      : 1985      MRN: [id number]  Encounter Provider: Maya Riley MD  Encounter Date: 2025   Encounter department: [facility] [place]  :  Assessment & Plan  Pre-transplant evaluation for ESRD (end stage renal disease)    Plan    Patient's case will be reviewed at transplant committee meeting.  Patient reports that his blood pressures are much better controlled recently.  He denies any current illnesses and overall is feeling well.    Patient was educated on microsite    Patient also asked about pancreas transplant-he was not deemed a candidate for pancreas transplant and we reviewed.     History of severe sleep apnea-pt does not tolerate CPAP     History of pulmonary nodules     History of stroke and lacunar infarct. 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  Patient followed with hematology and neurology prior.  On plavix and aspirin  -Recommendation-high risk of VTE in the perioperative period and DVT proph in perioperative period prior.  But given recent stroke with concern for SAH also, will need to rereview with surgery team regarding these recommendations from a transplant evaluation perspective     Pt with pulm HTN thought secondary to vol overload prior- prior discussion with AOT was at time of transplant, to consider ligation of fistula           There are no Patient Instructions on file for this visit.    It was a pleasure evaluating your patient in the office today. Thank you for allowing our team to participate in the care of Mr Mateus Mckeon. Please do not hesitate to contact our team if further issues/questions shall arise in the interim.     History of Present Illness   HPI  Mateus Mckeon is a 39 y.o. male who presents for follow-up visit for medical update for renal  transplant.  Patient presents with his mother.  Patient denies complaints.  No recent fevers, chills, nausea, vomiting.  No issues with dialysis currently that the patient reports.  He is able to get to his dry weight he states.  His blood pressures today are 150 systolic.  He has to take his afternoon medications he states still.      Pertinent Medical History     Mateus Mckeon is a 39 y.o. 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     ESRD presumed to be due to DM/HTN.  On HD since 2018.     Native disease secondary to diabetes/hypertension not biopsy proven     Prior MRI brain -new area of T2 hyperintensity in the right middle and right inferior cerebellar peduncle with resolution of a focus of hyperintensity in the right subinsular region.  Suggesting dissemination in time and space.  There is concern for demyelinating disease.     Renal Doppler study with no evidence of occlusive disease bilaterally.  There is possible intrinsic parenchymal disease on the left kidney.  Left renal artery was not able to be evaluated due to bowel gas.     CT scan of the abdomen and pelvis in November 2020 with moderate ascites, reason Deon and omental in edema stable from prior, otherwise no acute abnormality.     CT scan in November 2020 with small pulmonary nodule the right middle lobe.      Echocardiogram with SANJEEV in 2018 with EF 60%.     ECHO in July 2020 - EF 65%; mild diastolic dysfunction;      Stress test is nl in 2/2019 July 2021-patient underwent right left heart catheterization.  Mild-to-moderate elevated LVE DS, moderate pulmonary hypertension, no significant CAD, mid RCA 40% stenosis     October 2021-patient saw pulmonologist due to incidental finding of nodules in the lung.  Recommended repeat CT scan in 2022.  But no absolute contraindication from pulmonary standpoint for renal transplantation.     October 2021-patient saw [facility] team     December 2021-patient saw pulmonary hypertensive specialist at [facility] -was felt to be due to secondary to volume overload.  Pulmonary vasodilators was contraindicated.  Had repeat echocardiogram December 2021. States that there was reassuring features of normal RV size and function.  RVSP only 29. Therefore euvolemic.  -patient did not show features of moderate pulmonary hypertension that was seen 5 months prior.     Patient has sleep study with severe RACHEAL in July 2022.     July 2022-patient was admitted for hypoglycemia.  Insulin regimen was adjusted.     September 2022-patient saw [facility]- the clinical significance is unclear but there may be some association for arterial thrombus and prothrombin gene mutation. thrombophilia work-up does indicate patient has high risk for VTE in the perioperative period     October 2022-squamous cell carcinoma removed from left temple and status post Mohs procedure December 2022.     Saw [facility] team - pulm HTN felt to be 2/2 to vol overload  - ECHO EF 60%     November 2023-patient was admitted to the hospital with concern for gastroenteritis and improved with supportive care.  Patient was also noted to be hypertensive in the setting of not being able to take his medications due to severe nausea and vomiting prior to admission.  Improved with restarting blood pressure  medications.     January 2024-patient was admitted to the hospital with progressive worsening headache.  Had 2-week admission.  Was found initially to have concern of subarachnoid hemorrhage of unclear etiology.  Underwent cerebral angiogram with neurosurgery which was unrevealing.  MRI of the brain showed multifocal acute infarcts concerning for embolic infarcts.  Was felt to be possibly a complication of cerebral angiogram.  Had repeat cerebral angiogram in January 12, 2024 which was negative for subarachnoid hemorrhage.  Course was complicated by continued hypertension requiring medication adjustments.  There is also mastoid opacification on imaging but no sign of infection and therefore no further ENT intervention was recommended.  - MRI of the brain with evolving multifocal subacute infarcts without evidence of interval acute infarct.  Scattered areas of siderosis redemonstrated.  Large mastoid air cell effusion redemonstrated  - CT of the abdomen pelvis-trace pleural abdominal fluid and soft tissue edema likely related to patient's volume status.     Patient had another admission from January 25 to January 27-had presented with acute abdominal pain, nausea, vomiting.  Resolved with supportive care.  Had hypertensive urgency in the setting of pain.  Labetalol was adjusted.  - Echocardiogram with EF 70%, moderate concentric hypertrophy, LA moderately dilated, RA mildly dilated, trace MV regurgitation.     February 2024-patient was admitted to the hospital with headaches and elevated blood pressures at home.  Required Cardene drip and ICU management.     April 2023-patient saw neurosurgery team for follow-up.  Regarding benign perimesencephalic hemorrhage, neurosurgery team felt no further evaluation or workup was needed.  With regards to stroke, patient has residual weakness right upper extremity and has had improvement with therapy.  Also neuropathic pain.  From a neurosurgery standpoint, patient was cleared  for renal transplant.    I last saw the patient in May 2024 and patient is now being seen April 29, 2025 for medical update visit for renal transplant.        Since I last saw the patient-    - October 18 October 19, 2024-patient was admitted to the hospital with  fatigue, congestion and was diagnosed with COVID-19 infection and did receive 2 doses of remdesivir and was discharged thereafter.    November 15 November 11, 2024-patient was admitted to the hospital with dizziness, confusionAnd patient had a workup for stroke pathway which was negative for acute stroke.  Had hypertension to 230 systolic and required Cardene drip.  EEG was performed to rule out seizure.  Was started on hydralazine for hypertensive management and was stable for discharge on November 10th.    December 26, 2024-patient saw cardiologist for follow-up.  Echocardiogram November 2024 with EF 65%, grade 1 diastolic dysfunction, small pericardial effusion.  Was advised for follow-up in October.    January 4 January 7, 2025-patient was admitted to the hospital with elevated blood pressures of 230 systolic and headaches and vomiting.  Patient's nifedipine wasincreased, started on doxazosin and spironolactone    January 9 to January 13, 2025-patient presented with headache and was found to be in hypertensive urgency and was admitted to the ICU on Cardene drip.  Clonidine was adjusted and  doxazosin and caffeine were adjusted     Plan    Patient's case will be reviewed at transplant committee meeting.  Patient reports that his blood pressures are much better controlled recently.  He denies any current illnesses and overall is feeling well.    Patient was educated on microsite    Patient also asked about pancreas transplant-he was not deemed a candidate for pancreas transplant and we reviewed.     History of severe sleep apnea-pt does not tolerate CPAP     History of pulmonary nodules     History of stroke and lacunar infarct. Also has binocular vision  disorder concerning for demyelinating disorder and underwent plasma exchange prior.  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  Patient followed with hematology and neurology prior.  On plavix and aspirin  -Recommendation-high risk of VTE in the perioperative period and DVT proph in perioperative period prior.  But given recent stroke with concern for SAH also, will need to rereview with surgery team regarding these recommendations from a transplant evaluation perspective     Pt with pulm HTN thought secondary to vol overload prior- prior discussion with AOT was at time of transplant, to consider ligation of fistula        Review of Systems   Constitutional: Negative.  Negative for appetite change and fatigue.   HENT: Negative.     Eyes: Negative.    Respiratory: Negative.  Negative for shortness of breath.    Cardiovascular: Negative.  Negative for leg swelling.   Gastrointestinal: Negative.    Endocrine: Negative.    Genitourinary: Negative.  Negative for difficulty urinating.   Musculoskeletal: Negative.    Allergic/Immunologic: Negative.    Neurological: Negative.    Hematological: Negative.    Psychiatric/Behavioral: Negative.     All other systems reviewed and are negative.    Medical History Reviewed by provider this encounter:     .       Current Outpatient Medications on File Prior to Visit   Medication Sig Dispense Refill    aspirin (ECOTRIN LOW STRENGTH) 81 mg EC tablet Take 81 mg by mouth daily      atorvastatin (LIPITOR) 40 mg tablet TAKE 1 TABLET BY MOUTH ONCE DAILY IN THE EVENING 90 tablet 1    b complex vitamins capsule Take 1 capsule by mouth daily before lunch       calcium acetate (PHOSLO) capsule Take 1 capsule (667 mg total) by mouth 3 (three) times a day 90 capsule 0    cinacalcet (SENSIPAR) 60 MG tablet Take 1 tablet (60 mg total) by mouth daily 30 tablet 0    cloNIDine (CATAPRES) 0.2 mg tablet Take 1 tablet (0.2 mg total) by mouth every 8 (eight) hours  90 tablet 0    doxazosin (CARDURA) 4 mg tablet Take 1 tablet (4 mg total) by mouth daily (Patient not taking: Reported on 3/19/2025) 30 tablet 0    escitalopram (LEXAPRO) 20 mg tablet Take 1 tablet (20 mg total) by mouth daily 90 tablet 1    famotidine (PEPCID) 10 mg tablet Take 1 tablet (10 mg total) by mouth daily (Patient taking differently: Take 40 mg by mouth daily) 30 tablet 2    gabapentin (NEURONTIN) 100 mg capsule TAKE 1 CAPSULE BY MOUTH IN THE MORNING AND 2 CAPSULES AT BEDTIME 90 capsule 0    GLUCAGON EMERGENCY 1 MG injection   3    Gvoke HypoPen 1-Pack 1 MG/0.2ML SOAJ as needed      hydrALAZINE (APRESOLINE) 100 MG tablet Take 1 tablet (100 mg total) by mouth every 8 (eight) hours 90 tablet 2    hydrOXYzine HCL (ATARAX) 10 mg tablet Take 1 tablet (10 mg total) by mouth every 6 (six) hours as needed for itching or anxiety 30 tablet 3    hyoscyamine (ANASPAZ,LEVSIN) 0.125 MG tablet Take 1 tablet (0.125 mg total) by mouth every 4 (four) hours as needed for cramping (Patient not taking: Reported on 3/19/2025) 30 tablet 0    Insulin Disposable Pump (Omnipod 5 G6 Intro, Gen 5,) KIT       Insulin Disposable Pump (Omnipod 5 G6 Pod, Gen 5,) MISC       labetalol (NORMODYNE) 200 mg tablet Take 2 tablets (400 mg total) by mouth 3 (three) times a day 180 tablet 2    losartan (COZAAR) 100 MG tablet Take 1 tablet (100 mg total) by mouth daily at bedtime 30 tablet 2    NIFEdipine (PROCARDIA XL) 90 mg 24 hr tablet Take 1 tablet (90 mg total) by mouth 2 (two) times a day 60 tablet 0    NovoLOG 100 UNIT/ML injection       ondansetron (ZOFRAN) 4 mg tablet Take 1 tablet (4 mg total) by mouth every 8 (eight) hours as needed for nausea or vomiting 90 tablet 1    Patiromer Sorbitex Calcium (VELTASSA PO) Take 5 g by mouth once a week      SPS 15 GM/60ML suspension TAKE 60 ML BY MOUTH SINGLE DOSE FOR EMERGENCY USE DURING MISSED HEMODIALYSIS      torsemide (DEMADEX) 100 mg tablet Take 1 tablet (100 mg total) by mouth daily Do not  start before January 8, 2025. 30 tablet 2    traZODone (DESYREL) 50 mg tablet Take 1 tablet (50 mg total) by mouth daily at bedtime as needed for sleep 30 tablet 1     No current facility-administered medications on file prior to visit.     Objective   There were no vitals taken for this visit.     Physical Exam  Vitals and nursing note reviewed.   Constitutional:       General: He is not in acute distress.     Appearance: He is well-developed. He is not diaphoretic.   HENT:      Head: Normocephalic and atraumatic.      Comments: No thrush on oral exam    Eyes:      General: No scleral icterus.        Right eye: No discharge.         Left eye: No discharge.      Conjunctiva/sclera: Conjunctivae normal.       Cardiovascular:      Rate and Rhythm: Normal rate and regular rhythm.      Heart sounds: Normal heart sounds. No murmur heard.     No friction rub. No gallop.   Pulmonary:      Effort: Pulmonary effort is normal. No respiratory distress.      Breath sounds: Normal breath sounds. No wheezing or rales.   Chest:      Chest wall: No tenderness.   Abdominal:      General: Bowel sounds are normal. There is no distension.      Palpations: Abdomen is soft. There is no mass.      Tenderness: There is no abdominal tenderness. There is no rebound.     Musculoskeletal:         General: No tenderness or deformity. Normal range of motion.      Cervical back: Normal range of motion and neck supple.      Right lower leg: No edema.      Left lower leg: No edema.      Comments: Patient ambulates with cane.    Left upper extremity AV fistula good thrill and bruit     Skin:     General: Skin is warm and dry.      Coloration: Skin is not pale.      Findings: No erythema or rash.     Neurological:      Mental Status: He is alert and oriented to person, place, and time.      Cranial Nerves: No cranial nerve deficit.      Comments: Ambulates with cane   Psychiatric:         Behavior: Behavior normal.         Thought Content: Thought  "content normal.         Judgment: Judgment normal.           Laboratory Results:        Invalid input(s): \"ALBUMIN\"    Results for orders placed or performed in visit on 04/08/25    Diabetes Eye Exam   Result Value Ref Range    Right Eye Diabetic Retinopathy Positive     Left Eye Diabetic Retinopathy Positive      *Note: Due to a large number of results and/or encounters for the requested time period, some results have not been displayed. A complete set of results can be found in Results Review.         "

## 2025-04-29 NOTE — PATIENT INSTRUCTIONS
1) We will review your case at the transplant committee meeting and will review our decision with you in approximately 4 weeks over the phone.  2) If you do not receive a call from [facility] within 4 weeks, please call our local Nephrology office.  3) From a renal transplant evaluation purpose, we will see you once a year in our local office for medical follow-up.  4) Once we call you with our decision regarding further evaluation, you will receive further instruction over the phone and in the mail regarding the next steps to complete the transplant evaluation.  5) All of your non transplant evaluation follow up regarding your regular medical follow ups, your renal care follow ups, and any other specialists visits you are following with should continue as you are advised by those respective physicians and continue to follow with their management and care.  And if there is any emergency please go to the nearest urgent care or emergency room.

## 2025-04-30 NOTE — PSYCH
MEDICATION MANAGEMENT NOTE    [facility] MENTAL HEALTH SERVICES      Name and Date of Birth:  Mateus Mckeon 39 y.o. 1985 MRN: [id number]    Date of Visit: May 1, 2025    Reason for Visit: Follow-up visit for medication management     Administrative Statements   Encounter provider Pedrito Bermeo MD    The Patient is located at Home and in the following state in which I hold an active license [place].    The patient was identified by name and date of birth. Mateus Mckeon was informed that this is a telemedicine visit and that the visit is being conducted through the Epic Embedded platform. He agrees to proceed..  My office door was closed. No one else was in the room.  He acknowledged consent and understanding of privacy and security of the video platform. The patient has agreed to participate and understands they can discontinue the visit at any time.    I have spent a total time of 28 minutes in caring for this patient on the day of the visit/encounter including Risks and benefits of tx options, Instructions for management, Patient and family education, Importance of tx compliance, Risk factor reductions, Impressions, Counseling / Coordination of care, Documenting in the medical record, Reviewing/placing orders in the medical record (including tests, medications, and/or procedures), and Obtaining or reviewing history  , not including the time spent for establishing the audio/video connection.         SUBJECTIVE:    Mateus Mckeon is a 39 y.o. male with past psychiatric history significant for major depressive disorder, generalized anxiety, cannabis use (medical) and insomnia who was personally seen and evaluated today at the [facility] outpatient clinic for follow-up and medication management. Mateus presents as pleasant and cooperative. His thoughts are linear and organized.     Mateus endorses compliance with psychotropic medication regimen consisting  "of Lexapro and PRN Trazodone. He denies adverse medication side effects. Paulo shares that he has completed PT/OT since last visit. He felt a sense of accomplishment regarding this. Paulo is pleased to share that he acquired a new dog, Aguilar, who has provided significant benefit to his life. This was processed at length. His dog has showed Paulo that \"he does matter\" and his life has purpose and meaning. We discussed the structure/routine that the dog provides, as well as physical exercise and accountability. Paulo shares that his overall state of MH has improved greatly with this addition. Throughout today's visit, both CBT and supportive therapy provided. Acutely, Paulo continues to use PRN trazodone (3x per week) for insomnia. His appetite is satisfactory. NO current SI/HI today. His energy and motivation continue to waxe and wane depending on sleep and his medical comorbidities. Paulo's concentration and focus are limited at times. However, he recently started speech therapy (and completed updated EEG) which has been helpful. Paulo is far less anhedonia compared to previous visits. No recent hopelessness or lethality concerns. Mateus currently endorses occasional and appropriate anxiety that is not pathologic in nature. Mateus denies new-onset panic symptomatology or maladaptive behaviors. Throughout today's session, Mateus does not appear visibly perturbed. Paulo is future oriented, detailing plans to travel to his family's cabin outside of [place], which should be pleasurable. During today's examination, Mateus does not exhibit objective evidence of aziza/hypomania or psychosis. Mateus is not currently irritable, grandiose, labile, or pathologically euphoric. Mateus is without perceptual disturbances, such as A/V hallucinations, paranoia, ideas of reference, or delusional beliefs. Mateus denies recent ETOH or illicit substance abuse. Mateus offers no further concerns.     Current Rating Scores:     None " completed today.    Review Of Systems:      Constitutional fluctuating energy level and as noted in HPI   ENT negative   Cardiovascular elevated blood pressure   Respiratory negative   Gastrointestinal negative   Genitourinary negative   Musculoskeletal arthralgias, myalgias, and joint pain   Integumentary negative   Neurological decreased memory and muscle weakness   Endocrine negative   Other Symptoms none, all other systems are negative       Past Psychiatric History: (unchanged information from previous note copied and italicized) - Information that is bolded has been updated.      Inpatient psychiatric admissions: Denies  Prior outpatient psychiatric linkage: Denies  Past/current psychotherapy: Currently linked with Jakob Kessler   History of suicidal attempts/gestures: Denies  History of violence/aggressive behaviors: Denies  Psychotropic medication trials: Lexapro (3 years ago), Zoloft, Trazodone (now)  Substance abuse inpatient/outpatient rehabilitation: Denies     Substance Abuse History: (unchanged information from previous note copied and italicized) - Information that is bolded has been updated.      No history of ETOH or illict substance abuse. Bill does endorse previous tobacco abuse and current medical cannabis prescription. No past legal actions or arrests secondary to substance intoxication. The patient denies prior DWIs/DUIs. No history of outpatient/inpatient rehabilitation programs. Mateus does not exhibit objective evidence of substance withdrawal during today's examination nor does Mateus appear under the influence of any psychoactive substance.          Social History: (unchanged information from previous note copied and italicized) - Information that is bolded has been updated.      Developmental: Denies a history of milestone/developmental delay. Denies a history of in-utero exposure to toxins/illicit substances. There is no documented history of IEP or need for special education. He was  born and raised in [place], [place]. He lived there for 27 years. He moved to the [facility] in 2011 to live with his sister and work as . His parents have since relocated and he now lives with them.   Education: college graduate - [employer] class of 2006 - degree in theatre   Marital history: single  Living arrangement, social support: parents - has a sister who has children (nephews and nieces)   Occupational History: on permanent disability  Access to firearms: Denies direct access to weapons/firearms. Mateus Mckeon has no history of arrests or violence with a deadly weapon.      Traumatic History: (unchanged information from previous note copied and italicized) - Information that is bolded has been updated.     Abuse:none is reported  Other Traumatic Events: 2 CVAs - 2015 and 2018    Past Medical History:    Past Medical History:   Diagnosis Date    Acute kidney injury (HCC)     Ambulates with cane     Anuria     Anxiety     Cellulitis of right elbow 03/31/2021    Chronic kidney disease     COVID-19 10/18/2024    Depression     Diabetes mellitus (HCC)     Diarrhea     Emesis 10/24/2020    End stage renal disease (HCC) 02/11/2018    Formatting of this note might be different from the original. Last Assessment & Plan:  Secondary to DM.  On nightly PD.  Followed by [facility].  Patient considering transplant for kidney and pancreas through [facility] Formatting of this note might be different from the original. Last Assessment & Plan:  Formatting of this note might be different from the original. 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    Esophagitis 07/21/2015    Falls     Gastroparesis     GERD (gastroesophageal reflux disease)     History of shingles 2010    History of transfusion 02/2018    no adverse reaction    Hyperlipidemia     Hyperphosphatemia     Hypertension     Hypoglycemia 07/15/2022    Itching     Mastoiditis of right side 07/15/2022    Muscle weakness      general unsteadiness    Obesity (BMI 30.0-34.9) 09/09/2019    Orthostatic hypotension 10/25/2020    Peripheral polyneuropathy 11/20/2019    PONV (postoperative nausea and vomiting) 01/26/2018    Protein-calorie malnutrition (HCC) 11/23/2020    Recurrent peritonitis (HCC) due to peritoneal dialysis catheter 07/31/2020    Retinopathy     Seizures (HCC)     early 2020 - one time    Skin abnormality     some dime size areas where skin was scratched from itching    Sleep apnea     Spontaneous bacterial peritonitis (HCC) 10/19/2020    Squamous cell skin cancer     left temple    Stroke (HCC)     x2 - off balance/no driving/fatigue    Swelling of both lower extremities     Swelling of joint of upper arm, right 04/03/2024    Traumatic onycholysis 07/21/2022    Vomiting     Wears glasses     Word finding difficulty 11/05/2024        Past Surgical History:   Procedure Laterality Date    CARDIAC ELECTROPHYSIOLOGY PROCEDURE N/A 9/21/2023    Procedure: Cardiac loop recorder explant;  Surgeon: Parish Morgan MD;  Location: [facility];  Service: Cardiology    CARDIAC LOOP RECORDER  05/2018    COLONOSCOPY      EGD      EYE SURGERY Right     HEMODIALYSIS ADULT  11/6/2024    IR AV FISTULAGRAM/GRAFTOGRAM  02/23/2021    IR CEREBRAL ANGIOGRAPHY  1/12/2024    IR CEREBRAL ANGIOGRAPHY / INTERVENTION  1/5/2024    IR TUNNELED CENTRAL LINE PLACEMENT  02/16/2021    IR TUNNELED DIALYSIS CATHETER PLACEMENT  11/18/2020    IR TUNNELED DIALYSIS CATHETER REMOVAL  02/12/2021    IR TUNNELED DIALYSIS CATHETER REMOVAL  03/11/2021    MOHS SURGERY Left 12/14/2022    Left temple with Dr. Hassna    PERITONEAL CATHETER INSERTION N/A 08/27/2018    Procedure: UNROOF PD CATHETER;  Surgeon: Felipe Lindo DO;  Location: [facility];  Service: General    GA ARTERIOVENOUS ANASTOMOSIS OPEN DIRECT Left 11/09/2020    Procedure: CREATION FISTULA  ARTERIOVENOUS (AV) - LEFT WRIST;  Surgeon: Placido Altamirano MD;  Location: [facility];  Service: Vascular    GA  ESOPHAGOGASTRODUODENOSCOPY TRANSORAL DIAGNOSTIC N/A 2019    Procedure: ESOPHAGOGASTRODUODENOSCOPY (EGD);  Surgeon: Ale Figueroa MD;  Location: [facility] GI [facility];  Service: Gastroenterology    UT LAPS INSERTION TUNNELED INTRAPERITONEAL CATHETER N/A 2018    Procedure: LAPAROSCOPIC PD CATHETER PLACEMENT;  Surgeon: Felipe Lindo DO;  Location: [facility];  Service: General    UT REMOVAL TUNNELED INTRAPERITONEAL CATHETER N/A 2020    Procedure: REMOVAL CATHETER PERITONEAL DIALYSIS;  Surgeon: Abdifatah Ty MD;  Location: [facility];  Service: General    TONSILLECTOMY      UPPER GASTROINTESTINAL ENDOSCOPY       Allergies   Allergen Reactions    Sulfa Antibiotics Rash       Substance Abuse History:    Social History     Substance and Sexual Activity   Alcohol Use Not Currently     Social History     Substance and Sexual Activity   Drug Use Yes    Types: Marijuana    Comment: medical marijuana last vaped 2024       Social History:    Social History     Socioeconomic History    Marital status: Single     Spouse name: Not on file    Number of children: Not on file    Years of education: Not on file    Highest education level: Not on file   Occupational History    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   Tobacco Use    Smoking status: Former     Current packs/day: 0.00     Average packs/day: 0.5 packs/day for 12.0 years (6.0 ttl pk-yrs)     Types: Cigarettes     Start date: 2006     Quit date: 2018     Years since quittin.2    Smokeless tobacco: Never    Tobacco comments:     quit 2018   Vaping Use    Vaping status: Every Day    Substances: THC, CBD   Substance and Sexual Activity    Alcohol use: Not Currently    Drug use: Yes     Types: Marijuana     Comment: medical marijuana last vaped 2024    Sexual activity: Not Currently   Other Topics Concern    Not on file   Social History Narrative    Caffeine use    single     Social Drivers of Health     Financial Resource Strain:  Low Risk  (2023)    Overall Financial Resource Strain (CARDIA)     Difficulty of Paying Living Expenses: Not hard at all   Food Insecurity: No Food Insecurity (1/10/2025)    Nursing - Inadequate Food Risk Classification     Worried About Running Out of Food in the Last Year: Never true     Ran Out of Food in the Last Year: Never true     Ran Out of Food in the Last Year: Never true   Transportation Needs: No Transportation Needs (1/10/2025)    Nursing - Transportation Risk Classification     Lack of Transportation: Not on file     Lack of Transportation: No   Physical Activity: Not on file   Stress: Not on file   Social Connections: Unknown (2024)    Received from [facility]     How often do you feel lonely or isolated from those around you? (Adult - for ages 18 years and over): Not on file   Intimate Partner Violence: Unknown (1/10/2025)    Nursing IPS     Feels Physically and Emotionally Safe: Not on file     Physically Hurt by Someone: Not on file     Humiliated or Emotionally Abused by Someone: Not on file     Physically Hurt by Someone: No     Hurt or Threatened by Someone: No   Housing Stability: Unknown (1/10/2025)    Nursing: Inadequate Housing Risk Classification     Has Housing: Not on file     Worried About Losing Housing: Not on file     Unable to Get Utilities: Not on file     Unable to Pay for Housing in the Last Year: No     Has Housin       Family Psychiatric History:     Family History   Problem Relation Age of Onset    Breast cancer Mother     Hypertension Mother     Hyperlipidemia Father     Hypertension Father     Leukemia Maternal Grandmother     Hyperlipidemia Maternal Grandfather     Hypertension Maternal Grandfather     Hyperlipidemia Paternal Grandmother     Hypertension Paternal Grandmother     Heart disease Paternal Grandfather         cardiac disorder    Diabetes Paternal Grandfather        History Review: The following portions of the patient's history  were reviewed and updated as appropriate: allergies, current medications, past family history, past medical history, past social history, past surgical history, and problem list.         OBJECTIVE:     Vital signs in last 24 hours:    There were no vitals filed for this visit.    Mental Status Evaluation:    Appearance age appropriate, casually dressed, dressed appropriately, looks stated age   Behavior pleasant, cooperative, calm   Speech normal rate, normal volume, normal pitch   Mood less anxious, less depressed   Affect normal range and intensity, appropriate   Thought Processes organized, logical, goal directed   Associations intact associations   Thought Content no overt delusions   Perceptual Disturbances: no auditory hallucinations, no visual hallucinations   Abnormal Thoughts  Risk Potential Suicidal ideation - None at present  Homicidal ideation - None at present  Potential for aggression - No   Orientation oriented to person, place, time/date, and situation   Memory Did not formally test   Consciousness alert and awake   Attention Span Concentration Span attention span and concentration are age appropriate   Intellect appears to be of average intelligence   Insight intact and good   Judgement intact and good   Muscle Strength and  Gait unable to assess today due to virtual visit   Motor activity no abnormal movements   Language no difficulty naming common objects   Fund of Knowledge adequate knowledge of current events   Pain moderate   Pain Scale Did not ask patient to formally rate       Laboratory Results: I have personally reviewed all pertinent laboratory/tests results    Recent Labs:   Orders Only on 04/08/2025   Component Date Value    Right Eye Diabetic Retin* 04/08/2025 Positive     Left Eye Diabetic Retino* 04/08/2025 Positive        Suicide/Homicide Risk Assessment:    The following interventions are recommended: continue medication management, no intervention changes needed      Lethality  Statement:    Based on today's assessment and clinical criteria, Mateus Mckeon contracts for safety and is not an imminent risk of harm to self or others. Outpatient level of care is deemed appropriate at this current time. Mateus understands that if they can no longer contract for safety, they need to call the office or report to their nearest Emergency Room for immediate evaluation.      Assessment/Plan:     Mateus Mckeon is a 39 y.o. male with past psychiatric history significant for major depressive disorder, generalized anxiety, cannabis use (medical) and insomnia who was personally seen and evaluated today at the [facility] outpatient clinic for follow-up and medication management. Paulo endorses an extensive medical history complicated by 2 cerebral vascular accidents in 2015 and 2018. Paulo states that the etiology of these CVAs is unknown but suspected to be secondary to unmanaged diabetes mellitus, hypertension, and history of nicotine use. Paulo states that his second CVA in 2018 was far more debilitating than the first and result in significant impairment in occupational and social functionality. He now ambulates slowly with a walking cane and is on disability. He is no longer independent and requires transportation assistance via family. He also had to move back in with his parents which has been distressing. Over the last few years, Paulo states that he was placed on the [facility] renal transplant list and was awaiting a harvested kidney. In October 2020, in the context of familial discord and recent verbal disagreement with mother, Paulo voiced vague and fleeting thoughts of self-harm to his dialysis nurse. She was perturbed by these statements and referred him to his PCP. While being evaluated by his PCP, Paulo was then sent to the ED where he was ultimately given psychiatric resources in the community and discharged home. Unfortunately, as a result of this incident, Paulo was  "removed from the transplant list for \"1 year\". This is particularly disconcerting as Paulo did not actually harm himself or engage in any self-destructive behavior. He was transparent with his treatment team and emotional raw after a disagreement with his mother. By undergoing dialysis at the exact moment he voiced these concerns to his nurse, he was seeking treatment and thus, acting in a self-preserving manner, suggestive of a will to live. Paulo is frustrated regarding being removed from the renal transplant process and is seeking transplant via [facility]. His frustration regarding this process again, is representative of future-orientation and a will to live.       Historically, Mateus denies most neurovegetative symptomatology suggestive of major depressive disorder or dysthymia. Mateus does admit to fragmented or non-restorative sleep that ultimately contributes to anergia and amotivation. He has lost his sense of connectivity and purpose as a result of his CVAs and physical limitations, however, he is engaging in therapy and now medication management to regain these. Mateus adamantly denies acute thoughts of suicide or self-harm. Paulo denies acute anxiety that is overwhelming but does repot historical symptomatology suggestive of pathologic/overwhelming anxiety. He does not experience daily or weekly panic attacks. He admits to mild nervousness and fearfulness regarding his health, which is appropriate. He does not feel on-edge, restless, or perpetually irritable. Mateus vehemently denies any acute or chronic history suggestive of an underlying affective (bipolar) organization. [email or web address] denies historical symptomatology suggestive of an underlying psychotic process.      Today's Plan:     Psychopharmacologically, Paulo reports benefit and tolerability with current regimen. No current need for medication change or intervetion.      DSM-V Diagnoses:      1.) Major depressive disorder (moderate, " recurrent)  2.) Generalized Anxiety Disorder  3.) Medical Cannabis Use  4.) Insomnia        Treatment Recommendations/Precautions:        1.) Major depressive disorder (moderate, recurrent)  - Continue Lexapro 20mg Daily     2.) Generalized Anxiety Disorder  - Continue Lexapro 20mg Daily  - Continue PRN Hydroxyzine 10mg         3.) Medical Cannabis Use  - Counseled to avoid ETOH, illict substances, and nicotine secondary to the detrimental effects of these substances on mental and physical health  - No concerns for misuse         4.) Insomnia   - Continue Trazodone 25mg QHS PRN   Assessment & Plan  Moderate episode of recurrent major depressive disorder (HCC)    Orders:    escitalopram (LEXAPRO) 20 mg tablet; Take 1 tablet (20 mg total) by mouth daily    Generalized anxiety disorder    Orders:    traZODone (DESYREL) 50 mg tablet; Take 1 tablet (50 mg total) by mouth daily at bedtime as needed for sleep    escitalopram (LEXAPRO) 20 mg tablet; Take 1 tablet (20 mg total) by mouth daily    Psychophysiological insomnia    Orders:    traZODone (DESYREL) 50 mg tablet; Take 1 tablet (50 mg total) by mouth daily at bedtime as needed for sleep        Does not want any medication changes  Aware of need to follow up with family physician for medical issues  Aware of 24 hour and weekend coverage for urgent situations accessed by calling [facility] main practice number    Medications Risks/Benefits      Risks, Benefits And Possible Side Effects Of Medications:    Risks, benefits, and possible side effects of medications explained to Mateus including risk of suicidality and serotonin syndrome related to treatment with antidepressants. He verbalizes understanding and agreement for treatment.    Controlled Medication Discussion:     Not applicable    Psychotherapy Provided:     Individual psychotherapy provided: Yes  Counseling was provided during the session today for 18 minutes.  Medication education  provided to Mateus.  Recent stressors discussed with Mateus including family issues, health issues, medical problems, chronic pain, recent medication change, limited support, social difficulties, everyday stressors, and occasional anxiety.  Coping strategies reviewed with Mateus.   Educated on importance of medication and treatment compliance.  Supportive therapy provided.   Cognitive therapy was utilized during the session.     Treatment Plan:    Completed and signed during the session: Not applicable - Treatment Plan not due at this session      Visit Time    Visit Start Time: 8:02 Am  Visit Stop Time: 8:30 am  Total Visit Duration:  28 minutes     The total visit duration detailed above includes: patient engagement, medication management, psychotherapy/counseling, discussion regarding treatment goals, documentation, review of past medical records, and coordination of care.      Pedrito Bermeo MD  Board Certified Diplomate of the American Board of Psychiatry and Neurology  05/01/25

## 2025-05-01 ENCOUNTER — TELEPHONE (OUTPATIENT)
Dept: PSYCHIATRY | Facility: CLINIC | Age: 40
End: 2025-05-01

## 2025-05-01 ENCOUNTER — TELEMEDICINE (OUTPATIENT)
Dept: PSYCHIATRY | Facility: CLINIC | Age: 40
End: 2025-05-01
Payer: MEDICARE

## 2025-05-01 ENCOUNTER — APPOINTMENT (OUTPATIENT)
Facility: CLINIC | Age: 40
End: 2025-05-01
Payer: MEDICARE

## 2025-05-01 DIAGNOSIS — F41.1 GENERALIZED ANXIETY DISORDER: Chronic | ICD-10-CM

## 2025-05-01 DIAGNOSIS — F51.04 PSYCHOPHYSIOLOGICAL INSOMNIA: ICD-10-CM

## 2025-05-01 DIAGNOSIS — F33.1 MODERATE EPISODE OF RECURRENT MAJOR DEPRESSIVE DISORDER (HCC): Primary | Chronic | ICD-10-CM

## 2025-05-01 PROCEDURE — 90833 PSYTX W PT W E/M 30 MIN: CPT | Performed by: STUDENT IN AN ORGANIZED HEALTH CARE EDUCATION/TRAINING PROGRAM

## 2025-05-01 PROCEDURE — G2211 COMPLEX E/M VISIT ADD ON: HCPCS | Performed by: STUDENT IN AN ORGANIZED HEALTH CARE EDUCATION/TRAINING PROGRAM

## 2025-05-01 PROCEDURE — 99213 OFFICE O/P EST LOW 20 MIN: CPT | Performed by: STUDENT IN AN ORGANIZED HEALTH CARE EDUCATION/TRAINING PROGRAM

## 2025-05-01 RX ORDER — TRAZODONE HYDROCHLORIDE 50 MG/1
50 TABLET ORAL
Qty: 30 TABLET | Refills: 1 | Status: SHIPPED | OUTPATIENT
Start: 2025-05-01

## 2025-05-01 RX ORDER — ESCITALOPRAM OXALATE 20 MG/1
20 TABLET ORAL DAILY
Qty: 90 TABLET | Refills: 1 | Status: SHIPPED | OUTPATIENT
Start: 2025-05-01

## 2025-05-01 NOTE — ASSESSMENT & PLAN NOTE
Orders:    traZODone (DESYREL) 50 mg tablet; Take 1 tablet (50 mg total) by mouth daily at bedtime as needed for sleep    escitalopram (LEXAPRO) 20 mg tablet; Take 1 tablet (20 mg total) by mouth daily

## 2025-05-01 NOTE — TELEPHONE ENCOUNTER
Called and left message for patient to return a call to [phone] and schedule 4 month follow up with provider (Dr Bermeo ). Please schedule upon return call. Thank you.

## 2025-05-05 ENCOUNTER — OFFICE VISIT (OUTPATIENT)
Dept: SPEECH THERAPY | Facility: CLINIC | Age: 40
End: 2025-05-05
Attending: INTERNAL MEDICINE
Payer: MEDICARE

## 2025-05-05 DIAGNOSIS — I63.9 CEREBROVASCULAR ACCIDENT (CVA), UNSPECIFIED MECHANISM (HCC): ICD-10-CM

## 2025-05-05 DIAGNOSIS — R41.841 COGNITIVE COMMUNICATION DEFICIT: Primary | ICD-10-CM

## 2025-05-05 PROCEDURE — 92507 TX SP LANG VOICE COMM INDIV: CPT

## 2025-05-05 NOTE — PROGRESS NOTES
Speech Treatment Note    Today's date: 2025  Patient name: Mateus Mckeon  : 1985  MRN: [id number]  Referring provider: Dania Sher DO  Dx:   Encounter Diagnosis     ICD-10-CM    1. Cognitive communication deficit  R41.841       2. Cerebrovascular accident (CVA), unspecified mechanism (HCC)  I63.9                   Start Time: 930  Stop Time: 1015  Total time in clinic (min): 45 minutes    POC expires Auth Status Total   Visits  Start date  Expiration date PT/OT + Visit Limit? Co-Insurance   25 Medicare  BOMN 3/13/25 N/A No  Yes                                             Visit/Unit Tracking  AUTH Status: BOMN Date 3/13 3/24 3/31 4/3 4/14 4/17 4/21 4/24 5/5      Visits  Authed: AFTER 24 VISTS. Used 1 2 3 4 5 6 7 8 9      PN: every 10th visit  Remaining  10 9 8 7 6 5 4 3 2           Subjective/Behavioral: Patient arrived on time and attended to all session activities. Patient appeared to be doing well and did not complain of pain.     Short-term goals:  1. Patient will complete attention dependent tasks with less than 2 errors per activity or 90% accuracy.     Paulo was tasked sorting cards into three groups (e.g., red, black, face) while simultaneously tracking 2 target cards (e.g., 2-7) and naming an item in a predetermined category (e.g., things that are red) when either card was shown. Bill made 0 attention errors. Patient appeared to benefit from using an association strategy (e.g., naming fruits). Patient was asked to memorize the list of eight red items and not write the named items down, thus increasing task complexity.      2. Improve high level word finding to 80% accuracy  Bill achieved 100% accuracy (I) with in immediate recall of list in goal one and 100% accuracy in delayed recall. Patient demonstrated quicker recall time. Task complexity should be increased in previous sessions.    Paulo began a mid-to-high level 4x5 category matrix puzzle (e.g., categories: extreme  weather, breakfast foods, famous people, zoo animals). Patient achieved 81% accuracy, independently (13/16 opportunities). Patient demonstrated increased processing time. Patient's accuracy improved following min-mod clinician cues in the form of semantic cues. Puzzle should be completed in next session.      3. Complete executive function dependent tasks with 80% accuracy or higher.   To target executive functioning and reasoning, Paulo was tasked with sorting a group of 16 words into four categories based off similarities than verbalizing the overlying connection (e.g., firetruck, gaudencio, stop sign, apple -->things that are red). Paulo completed two separate connections puzzles. In the first connections activity, Paulo sorted and verbalized the appropriate connection with 50% accuracy, independently (previously 25% - IMPROVEMENT), accuracy improved to 75% with min cues in the form of semantic cues. This same puzzle was attempted in previous session and significant improvement was noted. While patient achieved a low-level of accuracy independently, patient demonstrated appropriate strategies such as starting with words he knew went together. Exercise should be returned to in subsequent sessions.          Long-term goals:  -Improve cognitive linguistic function.     Skilled Intervention Statement  Titrated clueing was used throughout the above mentioned activities. Titrated clueing is a skilled, hierarchical intervention which allows for the patient to arrive at a correct response which would otherwise be outside their current capacity. It is initiated by the therapist and is based on clinical analysis of patient's response to the activity presented by the therapist. Under this system, the activity is then adjusted slightly in difficulty in order to maximize performance, which, in turn, drives recovery or helps maintain stability. Titrated clueing allows for maximum neuro-cognitive stimulation while permitting the  patient a meaningful measure of control over the task.     Other:Patient was provided with home exercises/ activies to target goals in plan of care.   Recommendations:Continue with Plan of Care

## 2025-05-06 ENCOUNTER — APPOINTMENT (OUTPATIENT)
Facility: CLINIC | Age: 40
End: 2025-05-06
Payer: MEDICARE

## 2025-05-08 ENCOUNTER — HOSPITAL ENCOUNTER (INPATIENT)
Facility: HOSPITAL | Age: 40
LOS: 1 days | Discharge: HOME | DRG: 304 | End: 2025-05-10
Attending: EMERGENCY MEDICINE | Admitting: INTERNAL MEDICINE
Payer: MEDICARE

## 2025-05-08 ENCOUNTER — APPOINTMENT (EMERGENCY)
Dept: RADIOLOGY | Facility: HOSPITAL | Age: 40
DRG: 304 | End: 2025-05-08
Payer: MEDICARE

## 2025-05-08 ENCOUNTER — APPOINTMENT (OUTPATIENT)
Facility: CLINIC | Age: 40
End: 2025-05-08
Payer: MEDICARE

## 2025-05-08 ENCOUNTER — APPOINTMENT (EMERGENCY)
Dept: CT IMAGING | Facility: HOSPITAL | Age: 40
DRG: 304 | End: 2025-05-08
Payer: MEDICARE

## 2025-05-08 DIAGNOSIS — E10.42 DIABETIC POLYNEUROPATHY ASSOCIATED WITH TYPE 1 DIABETES MELLITUS (HCC): ICD-10-CM

## 2025-05-08 DIAGNOSIS — R29.90 STROKE-LIKE SYMPTOM: Primary | ICD-10-CM

## 2025-05-08 DIAGNOSIS — N18.6 ESRD (END STAGE RENAL DISEASE) (HCC): ICD-10-CM

## 2025-05-08 DIAGNOSIS — R20.0 RIGHT UPPER EXTREMITY NUMBNESS: ICD-10-CM

## 2025-05-08 DIAGNOSIS — R42 DIZZINESS: ICD-10-CM

## 2025-05-08 DIAGNOSIS — I10 HYPERTENSION: ICD-10-CM

## 2025-05-08 DIAGNOSIS — R51.9 HEADACHE: ICD-10-CM

## 2025-05-08 LAB
2HR DELTA HS TROPONIN: 4 NG/L
ANION GAP SERPL CALCULATED.3IONS-SCNC: 11 MMOL/L (ref 4–13)
APTT PPP: 32 SECONDS (ref 23–34)
BUN SERPL-MCNC: 25 MG/DL (ref 5–25)
CALCIUM SERPL-MCNC: 9.1 MG/DL (ref 8.4–10.2)
CARDIAC TROPONIN I PNL SERPL HS: 24 NG/L (ref ?–50)
CARDIAC TROPONIN I PNL SERPL HS: 28 NG/L (ref ?–50)
CHLORIDE SERPL-SCNC: 98 MMOL/L (ref 96–108)
CO2 SERPL-SCNC: 30 MMOL/L (ref 21–32)
CREAT SERPL-MCNC: 8.49 MG/DL (ref 0.6–1.3)
ERYTHROCYTE [DISTWIDTH] IN BLOOD BY AUTOMATED COUNT: 15.1 % (ref 11.6–15.1)
GFR SERPL CREATININE-BSD FRML MDRD: 7 ML/MIN/1.73SQ M
GLUCOSE SERPL-MCNC: 164 MG/DL (ref 65–140)
GLUCOSE SERPL-MCNC: 182 MG/DL (ref 65–140)
HCT VFR BLD AUTO: 34.8 % (ref 36.5–49.3)
HGB BLD-MCNC: 11.7 G/DL (ref 12–17)
INR PPP: 0.98 (ref 0.85–1.19)
MCH RBC QN AUTO: 33.3 PG (ref 26.8–34.3)
MCHC RBC AUTO-ENTMCNC: 33.6 G/DL (ref 31.4–37.4)
MCV RBC AUTO: 99 FL (ref 82–98)
PLATELET # BLD AUTO: 256 THOUSANDS/UL (ref 149–390)
PMV BLD AUTO: 10 FL (ref 8.9–12.7)
POTASSIUM SERPL-SCNC: 4.8 MMOL/L (ref 3.5–5.3)
PROTHROMBIN TIME: 13.7 SECONDS (ref 12.3–15)
RBC # BLD AUTO: 3.51 MILLION/UL (ref 3.88–5.62)
SODIUM SERPL-SCNC: 139 MMOL/L (ref 135–147)
WBC # BLD AUTO: 5.34 THOUSAND/UL (ref 4.31–10.16)

## 2025-05-08 PROCEDURE — 71045 X-RAY EXAM CHEST 1 VIEW: CPT

## 2025-05-08 PROCEDURE — 36415 COLL VENOUS BLD VENIPUNCTURE: CPT

## 2025-05-08 PROCEDURE — 70496 CT ANGIOGRAPHY HEAD: CPT

## 2025-05-08 PROCEDURE — 82948 REAGENT STRIP/BLOOD GLUCOSE: CPT

## 2025-05-08 PROCEDURE — 85730 THROMBOPLASTIN TIME PARTIAL: CPT

## 2025-05-08 PROCEDURE — 84484 ASSAY OF TROPONIN QUANT: CPT

## 2025-05-08 PROCEDURE — 85027 COMPLETE CBC AUTOMATED: CPT

## 2025-05-08 PROCEDURE — 85610 PROTHROMBIN TIME: CPT

## 2025-05-08 PROCEDURE — 70498 CT ANGIOGRAPHY NECK: CPT

## 2025-05-08 PROCEDURE — 99285 EMERGENCY DEPT VISIT HI MDM: CPT | Performed by: EMERGENCY MEDICINE

## 2025-05-08 PROCEDURE — 80048 BASIC METABOLIC PNL TOTAL CA: CPT

## 2025-05-08 PROCEDURE — 93005 ELECTROCARDIOGRAM TRACING: CPT

## 2025-05-08 PROCEDURE — 99285 EMERGENCY DEPT VISIT HI MDM: CPT

## 2025-05-08 RX ORDER — LABETALOL 100 MG/1
200 TABLET, FILM COATED ORAL DAILY
Status: DISCONTINUED | OUTPATIENT
Start: 2025-05-09 | End: 2025-05-10 | Stop reason: HOSPADM

## 2025-05-08 RX ORDER — GABAPENTIN 100 MG/1
CAPSULE ORAL
Qty: 90 CAPSULE | Refills: 5 | Status: SHIPPED | OUTPATIENT
Start: 2025-05-08

## 2025-05-08 RX ORDER — VITAMIN B COMPLEX
1 CAPSULE ORAL
Status: DISCONTINUED | OUTPATIENT
Start: 2025-05-09 | End: 2025-05-08 | Stop reason: RX

## 2025-05-08 RX ORDER — ASPIRIN 81 MG/1
81 TABLET ORAL DAILY
Status: DISCONTINUED | OUTPATIENT
Start: 2025-05-09 | End: 2025-05-10 | Stop reason: HOSPADM

## 2025-05-08 RX ORDER — ACETAMINOPHEN 325 MG/1
975 TABLET ORAL ONCE
Status: COMPLETED | OUTPATIENT
Start: 2025-05-08 | End: 2025-05-08

## 2025-05-08 RX ORDER — LABETALOL 100 MG/1
400 TABLET, FILM COATED ORAL ONCE
Status: COMPLETED | OUTPATIENT
Start: 2025-05-08 | End: 2025-05-08

## 2025-05-08 RX ORDER — ONDANSETRON 2 MG/ML
4 INJECTION INTRAMUSCULAR; INTRAVENOUS EVERY 8 HOURS PRN
Status: DISCONTINUED | OUTPATIENT
Start: 2025-05-08 | End: 2025-05-08

## 2025-05-08 RX ORDER — HEPARIN SODIUM 5000 [USP'U]/ML
5000 INJECTION, SOLUTION INTRAVENOUS; SUBCUTANEOUS EVERY 8 HOURS SCHEDULED
Status: DISCONTINUED | OUTPATIENT
Start: 2025-05-08 | End: 2025-05-10 | Stop reason: HOSPADM

## 2025-05-08 RX ORDER — CINACALCET 30 MG/1
60 TABLET, FILM COATED ORAL DAILY
Status: DISCONTINUED | OUTPATIENT
Start: 2025-05-09 | End: 2025-05-10 | Stop reason: HOSPADM

## 2025-05-08 RX ORDER — ATORVASTATIN CALCIUM 40 MG/1
40 TABLET, FILM COATED ORAL EVERY EVENING
Status: DISCONTINUED | OUTPATIENT
Start: 2025-05-08 | End: 2025-05-10 | Stop reason: HOSPADM

## 2025-05-08 RX ORDER — LOSARTAN POTASSIUM 50 MG/1
100 TABLET ORAL DAILY
Status: DISCONTINUED | OUTPATIENT
Start: 2025-05-09 | End: 2025-05-10 | Stop reason: HOSPADM

## 2025-05-08 RX ORDER — CLONIDINE HYDROCHLORIDE 0.1 MG/1
0.2 TABLET ORAL 2 TIMES DAILY
Status: DISCONTINUED | OUTPATIENT
Start: 2025-05-08 | End: 2025-05-10

## 2025-05-08 RX ORDER — LABETALOL 100 MG/1
400 TABLET, FILM COATED ORAL DAILY
Status: DISCONTINUED | OUTPATIENT
Start: 2025-05-09 | End: 2025-05-10 | Stop reason: HOSPADM

## 2025-05-08 RX ORDER — TORSEMIDE 100 MG/1
100 TABLET ORAL DAILY
Status: DISCONTINUED | OUTPATIENT
Start: 2025-05-09 | End: 2025-05-10 | Stop reason: HOSPADM

## 2025-05-08 RX ORDER — CALCIUM ACETATE 667 MG/1
667 CAPSULE ORAL 3 TIMES DAILY
Status: DISCONTINUED | OUTPATIENT
Start: 2025-05-09 | End: 2025-05-10 | Stop reason: HOSPADM

## 2025-05-08 RX ORDER — HYDRALAZINE HYDROCHLORIDE 20 MG/ML
10 INJECTION INTRAMUSCULAR; INTRAVENOUS ONCE
Status: DISCONTINUED | OUTPATIENT
Start: 2025-05-08 | End: 2025-05-08

## 2025-05-08 RX ORDER — ESCITALOPRAM OXALATE 20 MG/1
20 TABLET ORAL DAILY
Status: DISCONTINUED | OUTPATIENT
Start: 2025-05-09 | End: 2025-05-10 | Stop reason: HOSPADM

## 2025-05-08 RX ORDER — HYDRALAZINE HYDROCHLORIDE 25 MG/1
100 TABLET, FILM COATED ORAL ONCE
Status: CANCELLED | OUTPATIENT
Start: 2025-05-08 | End: 2025-05-08

## 2025-05-08 RX ORDER — HYDROXYZINE HYDROCHLORIDE 10 MG/1
10 TABLET, FILM COATED ORAL EVERY 6 HOURS PRN
Status: DISCONTINUED | OUTPATIENT
Start: 2025-05-08 | End: 2025-05-10 | Stop reason: HOSPADM

## 2025-05-08 RX ORDER — ONDANSETRON 4 MG/1
4 TABLET, ORALLY DISINTEGRATING ORAL EVERY 8 HOURS PRN
Status: CANCELLED | OUTPATIENT
Start: 2025-05-08

## 2025-05-08 RX ORDER — HYDRALAZINE HYDROCHLORIDE 25 MG/1
100 TABLET, FILM COATED ORAL ONCE
Status: COMPLETED | OUTPATIENT
Start: 2025-05-08 | End: 2025-05-08

## 2025-05-08 RX ORDER — TRAZODONE HYDROCHLORIDE 50 MG/1
50 TABLET ORAL
Status: DISCONTINUED | OUTPATIENT
Start: 2025-05-08 | End: 2025-05-10 | Stop reason: HOSPADM

## 2025-05-08 RX ORDER — HYDRALAZINE HYDROCHLORIDE 25 MG/1
100 TABLET, FILM COATED ORAL EVERY 8 HOURS SCHEDULED
Status: DISCONTINUED | OUTPATIENT
Start: 2025-05-09 | End: 2025-05-10 | Stop reason: HOSPADM

## 2025-05-08 RX ORDER — ONDANSETRON 2 MG/ML
4 INJECTION INTRAMUSCULAR; INTRAVENOUS EVERY 6 HOURS PRN
Status: DISCONTINUED | OUTPATIENT
Start: 2025-05-08 | End: 2025-05-10 | Stop reason: HOSPADM

## 2025-05-08 RX ORDER — GABAPENTIN 100 MG/1
100 CAPSULE ORAL DAILY
Status: DISCONTINUED | OUTPATIENT
Start: 2025-05-09 | End: 2025-05-10 | Stop reason: HOSPADM

## 2025-05-08 RX ORDER — CALCIUM CARBONATE 500 MG/1
750 TABLET, CHEWABLE ORAL DAILY PRN
Status: DISCONTINUED | OUTPATIENT
Start: 2025-05-08 | End: 2025-05-10 | Stop reason: HOSPADM

## 2025-05-08 RX ORDER — HYDRALAZINE HYDROCHLORIDE 20 MG/ML
10 INJECTION INTRAMUSCULAR; INTRAVENOUS ONCE
Status: COMPLETED | OUTPATIENT
Start: 2025-05-08 | End: 2025-05-08

## 2025-05-08 RX ORDER — LABETALOL 100 MG/1
400 TABLET, FILM COATED ORAL
Status: DISCONTINUED | OUTPATIENT
Start: 2025-05-09 | End: 2025-05-10 | Stop reason: HOSPADM

## 2025-05-08 RX ORDER — GABAPENTIN 100 MG/1
200 CAPSULE ORAL
Status: DISCONTINUED | OUTPATIENT
Start: 2025-05-08 | End: 2025-05-10 | Stop reason: HOSPADM

## 2025-05-08 RX ADMIN — CLONIDINE HYDROCHLORIDE 0.2 MG: 0.1 TABLET ORAL at 23:00

## 2025-05-08 RX ADMIN — HYDRALAZINE HYDROCHLORIDE 100 MG: 25 TABLET ORAL at 19:47

## 2025-05-08 RX ADMIN — IOHEXOL 85 ML: 350 INJECTION, SOLUTION INTRAVENOUS at 19:10

## 2025-05-08 RX ADMIN — ATORVASTATIN CALCIUM 40 MG: 40 TABLET, FILM COATED ORAL at 23:00

## 2025-05-08 RX ADMIN — ACETAMINOPHEN 975 MG: 325 TABLET ORAL at 19:21

## 2025-05-08 RX ADMIN — GABAPENTIN 200 MG: 100 CAPSULE ORAL at 22:59

## 2025-05-08 RX ADMIN — HEPARIN SODIUM 5000 UNITS: 5000 INJECTION INTRAVENOUS; SUBCUTANEOUS at 23:00

## 2025-05-08 RX ADMIN — CALCIUM CARBONATE 750 MG: 500 TABLET, CHEWABLE ORAL at 23:13

## 2025-05-08 RX ADMIN — LABETALOL HYDROCHLORIDE 400 MG: 100 TABLET, FILM COATED ORAL at 19:46

## 2025-05-08 RX ADMIN — HYDRALAZINE HYDROCHLORIDE 10 MG: 20 INJECTION, SOLUTION INTRAMUSCULAR; INTRAVENOUS at 22:02

## 2025-05-08 NOTE — ED PROVIDER NOTES
Time reflects when diagnosis was documented in both MDM as applicable and the Disposition within this note       Time User Action Codes Description Comment    5/8/2025  7:00 PM GerardAime kidd [R29.90] Stroke-like symptom     5/8/2025  7:28 PM GerardAime kidd Orin [R42] Dizziness     5/8/2025  7:28 PM GerardAime kidd Add [R51.9] Headache     5/8/2025  9:20 PM Aime Gee [I10] Hypertension           ED Disposition       ED Disposition   Admit    Condition   Stable    Date/Time   Thu May 8, 2025  8:16 PM    Comment                  Assessment & Plan       Medical Decision Making  39-year-old male history of multiple strokes presenting due to dizziness and headache that started this morning when he woke up.  Patient states last night he was feeling his normal state of health but when he woke up this morning he has had constant feeling of the room spinning.  Patient has also had a headache.  Denies any other neurologic deficits at this time other than persistent deficits from previous strokes.  Denies any slurred speech or facial droop.  Patient does have chronic neuropathy in lower extremities as well as right upper extremity weakness from previous stroke.  Patient takes a daily ASA.    On exam patient is hypertensive, 212/102, vitals otherwise unremarkable.  Patient does have some right upper extremity weakness but this is from prior stroke.  Normal cranial nerve exam, patient is alert and oriented, no slurred speech.  Stroke alert called, discussed with Dr. Riley from neurology.  Plan for CT CTA head and neck as well as basic lab workup.  Due to symptoms being present at the time of wake up this morning, last known well was last night, patient will be outside of thrombolytic timeframe.    Amount and/or Complexity of Data Reviewed  Labs: ordered. Decision-making details documented in ED Course.  Radiology: ordered and independent interpretation performed. Decision-making details documented in ED  Course.    Risk  OTC drugs.  Prescription drug management.  Decision regarding hospitalization.        ED Course as of 05/08/25 2121   Thu May 08, 2025   1853 When pt woke up this AM, dizzy- room is spinning, worse with shot of espresso. Constant all day.  Headache.   1924 CT stroke alert brain  No acute intracranial abnormality.   1925 CTA stroke alert (head/neck)  No large vessel occlusion, high-grade stenosis, or intracranial aneurysm identified on CT angiogram of the head. Stable moderate stenosis of the left cavernous and supraclinoid internal carotid artery due to atherosclerotic plaque.     No hemodynamically significant stenosis or dissection identified on CT angiogram of the neck.     Mildly enlarged main pulmonary artery, indicative of a pulmonary hypertension.     1925 CT imaging unremarkable.  Discussed with neurology, plan for continuing 81 mg ASA daily and admit on stroke pathway for MRI follow-up.   1937 Blood Pressure(!): 241/114  Will give home doses of labetalol and hydralazine   1937 CBC and Platelet(!)  Near baseline   2119 hs TnI 0hr: 24   2119 Blood Pressure(!): 211/109       Medications   iohexol (OMNIPAQUE) 350 MG/ML injection (MULTI-DOSE) 85 mL (85 mL Intravenous Given 5/8/25 1910)   acetaminophen (TYLENOL) tablet 975 mg (975 mg Oral Given 5/8/25 1921)   labetalol (NORMODYNE) tablet 400 mg (400 mg Oral Given 5/8/25 1946)   hydrALAZINE (APRESOLINE) tablet 100 mg (100 mg Oral Given 5/8/25 1947)       ED Risk Strat Scores         Stroke Assessment       Row Name 05/08/25 1857 05/08/25 2121          NIH Stroke Scale    Interval Baseline 2 hours post-treatment     Level of Consciousness (1a.) 0 0     LOC Questions (1b.) 0 0     LOC Commands (1c.) 0 0     Best Gaze (2.) 0 0     Visual (3.) 0 0     Facial Palsy (4.) 0 0     Motor Arm, Left (5a.) 0 0     Motor Arm, Right (5b.) 1 0     Motor Leg, Left (6a.) 0 0     Motor Leg, Right (6b.) 0 0     Limb Ataxia (7.) 0 0     Sensory (8.) 0 0     Best  Language (9.) 0 0     Dysarthria (10.) 0 0     Extinction and Inattention (11.) (Formerly Neglect) 0 0     Total 1 0                   Flowsheet Row Most Recent Value   Thrombolytic Decision Options    Thrombolytic Decision Patient not a candidate.   Patient is not a candidate options Unclear time of onset outside appropriate time window.                    No data recorded                            History of Present Illness       Chief Complaint   Patient presents with    Dizziness     Pt reports he took an espresso shot this morning and started to feel dizzy like the room is spinning. Patient reports hx of strokes.        Past Medical History:   Diagnosis Date    Acute kidney injury (HCC)     Ambulates with cane     Anuria     Anxiety     Cellulitis of right elbow 03/31/2021    Chronic kidney disease     COVID-19 10/18/2024    Depression     Diabetes mellitus (HCC)     Diarrhea     Emesis 10/24/2020    End stage renal disease (HCC) 02/11/2018    Formatting of this note might be different from the original. Last Assessment & Plan:  Secondary to DM.  On nightly PD.  Followed by Nephro.  Patient considering transplant for kidney and pancreas through [facility] Formatting of this note might be different from the original. Last Assessment & Plan:  Formatting of this note might be different from the original. 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    Esophagitis 07/21/2015    Falls     Gastroparesis     GERD (gastroesophageal reflux disease)     History of shingles 2010    History of transfusion 02/2018    no adverse reaction    Hyperlipidemia     Hyperphosphatemia     Hypertension     Hypoglycemia 07/15/2022    Itching     Mastoiditis of right side 07/15/2022    Muscle weakness     general unsteadiness    Obesity (BMI 30.0-34.9) 09/09/2019    Orthostatic hypotension 10/25/2020    Peripheral polyneuropathy 11/20/2019    PONV (postoperative nausea and vomiting) 01/26/2018     Protein-calorie malnutrition (HCC) 11/23/2020    Recurrent peritonitis (HCC) due to peritoneal dialysis catheter 07/31/2020    Retinopathy     Seizures (HCC)     early 2020 - one time    Skin abnormality     some dime size areas where skin was scratched from itching    Sleep apnea     Spontaneous bacterial peritonitis (HCC) 10/19/2020    Squamous cell skin cancer     left temple    Stroke (HCC)     x2 - off balance/no driving/fatigue    Swelling of both lower extremities     Swelling of joint of upper arm, right 04/03/2024    Traumatic onycholysis 07/21/2022    Vomiting     Wears glasses     Word finding difficulty 11/05/2024      Past Surgical History:   Procedure Laterality Date    CARDIAC ELECTROPHYSIOLOGY PROCEDURE N/A 9/21/2023    Procedure: Cardiac loop recorder explant;  Surgeon: Parish Morgan MD;  Location: [facility];  Service: Cardiology    CARDIAC LOOP RECORDER  05/2018    COLONOSCOPY      EGD      EYE SURGERY Right     HEMODIALYSIS ADULT  11/6/2024    IR AV FISTULAGRAM/GRAFTOGRAM  02/23/2021    IR CEREBRAL ANGIOGRAPHY  1/12/2024    IR CEREBRAL ANGIOGRAPHY / INTERVENTION  1/5/2024    IR TUNNELED CENTRAL LINE PLACEMENT  02/16/2021    IR TUNNELED DIALYSIS CATHETER PLACEMENT  11/18/2020    IR TUNNELED DIALYSIS CATHETER REMOVAL  02/12/2021    IR TUNNELED DIALYSIS CATHETER REMOVAL  03/11/2021    MOHS SURGERY Left 12/14/2022    Left temple with Dr. Hassan    PERITONEAL CATHETER INSERTION N/A 08/27/2018    Procedure: UNROOF PD CATHETER;  Surgeon: Felipe Lindo DO;  Location: [facility];  Service: General    LA ARTERIOVENOUS ANASTOMOSIS OPEN DIRECT Left 11/09/2020    Procedure: CREATION FISTULA  ARTERIOVENOUS (AV) - LEFT WRIST;  Surgeon: Placido Altamirano MD;  Location: [facility];  Service: Vascular    LA ESOPHAGOGASTRODUODENOSCOPY TRANSORAL DIAGNOSTIC N/A 04/18/2019    Procedure: ESOPHAGOGASTRODUODENOSCOPY (EGD);  Surgeon: Ale Figueroa MD;  Location: [facility];  Service: Gastroenterology    LA LAPS  INSERTION TUNNELED INTRAPERITONEAL CATHETER N/A 2018    Procedure: LAPAROSCOPIC PD CATHETER PLACEMENT;  Surgeon: Felipe Lindo DO;  Location: [facility];  Service: General    KY REMOVAL TUNNELED INTRAPERITONEAL CATHETER N/A 2020    Procedure: REMOVAL CATHETER PERITONEAL DIALYSIS;  Surgeon: Abdifatah Ty MD;  Location[facility];  Service: General    TONSILLECTOMY      UPPER GASTROINTESTINAL ENDOSCOPY        Family History   Problem Relation Age of Onset    Breast cancer Mother     Hypertension Mother     Hyperlipidemia Father     Hypertension Father     Leukemia Maternal Grandmother     Hyperlipidemia Maternal Grandfather     Hypertension Maternal Grandfather     Hyperlipidemia Paternal Grandmother     Hypertension Paternal Grandmother     Heart disease Paternal Grandfather         cardiac disorder    Diabetes Paternal Grandfather       Social History     Tobacco Use    Smoking status: Former     Current packs/day: 0.00     Average packs/day: 0.5 packs/day for 12.0 years (6.0 ttl pk-yrs)     Types: Cigarettes     Start date: 2006     Quit date: 2018     Years since quittin.2    Smokeless tobacco: Never    Tobacco comments:     quit 2018   Vaping Use    Vaping status: Every Day    Substances: THC, CBD   Substance Use Topics    Alcohol use: Not Currently    Drug use: Yes     Types: Marijuana     Comment: medical marijuana last vaped 2024      E-Cigarette/Vaping    E-Cigarette Use Current Every Day User     Comments medical marijuana       E-Cigarette/Vaping Substances    Nicotine No     THC Yes     CBD Yes     Flavoring No     Other No     Unknown No       I have reviewed and agree with the history as documented.     39-year-old male history of multiple strokes presenting due to dizziness and headache that started this morning when he woke up.  Patient states last night he was feeling his normal state of health but when he woke up this morning he has had constant feeling of the room  spinning.  Patient has also had a headache.  Denies any other neurologic deficits at this time other than persistent deficits from previous strokes.  Denies any slurred speech or facial droop.  Patient does have chronic neuropathy in lower extremities as well as right upper extremity weakness from previous stroke.  Patient takes a daily ASA.        Review of Systems   Constitutional:  Negative for chills and fever.   Eyes:  Negative for visual disturbance.   Respiratory:  Negative for cough and shortness of breath.    Cardiovascular:  Negative for chest pain and palpitations.   Gastrointestinal:  Negative for abdominal pain, nausea and vomiting.   Musculoskeletal:  Negative for arthralgias and back pain.   Skin:  Negative for color change and rash.   Neurological:  Positive for dizziness, weakness (Chronic) and headaches. Negative for seizures, syncope, facial asymmetry, light-headedness and numbness.   All other systems reviewed and are negative.          Objective       ED Triage Vitals [05/08/25 1842]   Temperature Pulse Blood Pressure Respirations SpO2 Patient Position - Orthostatic VS   97.8 °F (36.6 °C) 67 (!) 236/107 18 100 % Sitting      Temp Source Heart Rate Source BP Location FiO2 (%) Pain Score    Oral Monitor Right arm -- 4      Vitals      Date and Time Temp Pulse SpO2 Resp BP Pain Score FACES Pain Rating User   05/08/25 2045 -- 67 -- -- 211/109 -- --    05/08/25 2000 -- 63 98 % 18 243/110 2 --    05/08/25 1946 -- 62 -- -- 231/111 -- --    05/08/25 1945 -- 63 97 % 18 231/111 2 --    05/08/25 1930 -- 64 97 % 18 241/114 2 --    05/08/25 1915 -- 67 98 % 18 241/114 3 --    05/08/25 1900 -- 66 98 % 18 214/104 4 --    05/08/25 1845 -- 68 98 % -- 212/102 -- --    05/08/25 1842 97.8 °F (36.6 °C) 67 100 % 18 236/107 4 -- HK            Physical Exam  Vitals and nursing note reviewed.   Constitutional:       General: He is not in acute distress.     Appearance: He is well-developed.   HENT:       Head: Normocephalic and atraumatic.      Nose: Nose normal. No congestion.      Mouth/Throat:      Mouth: Mucous membranes are moist.      Pharynx: Oropharynx is clear.   Eyes:      Extraocular Movements: Extraocular movements intact.      Conjunctiva/sclera: Conjunctivae normal.      Pupils: Pupils are equal, round, and reactive to light.   Cardiovascular:      Rate and Rhythm: Normal rate and regular rhythm.      Pulses: Normal pulses.      Heart sounds: Normal heart sounds. No murmur heard.  Pulmonary:      Effort: Pulmonary effort is normal. No respiratory distress.      Breath sounds: Normal breath sounds. No wheezing or rales.   Chest:      Chest wall: No tenderness.   Abdominal:      General: Abdomen is flat. Bowel sounds are normal. There is no distension.      Palpations: Abdomen is soft.      Tenderness: There is no abdominal tenderness.   Musculoskeletal:         General: No deformity or signs of injury.      Cervical back: Normal range of motion and neck supple. No rigidity or tenderness.      Right lower leg: No edema.      Left lower leg: No edema.      Comments: Weakness in right upper extremity   Skin:     General: Skin is warm and dry.      Findings: No bruising, lesion or rash.   Neurological:      General: No focal deficit present.      Mental Status: He is alert and oriented to person, place, and time.      Cranial Nerves: No cranial nerve deficit.      Sensory: Sensory deficit (Neuropathy of bilateral lower extremities) present.         Results Reviewed       Procedure Component Value Units Date/Time    HS Troponin I 4hr [[id number]]     Lab Status: No result Specimen: Blood     HS Troponin 0hr (reflex protocol) [[id number]]  (Normal) Collected: 05/08/25 1903    Lab Status: Final result Specimen: Blood from Arm, Right Updated: 05/08/25 2000     hs TnI 0hr 24 ng/L     HS Troponin I 2hr [[id number]]     Lab Status: No result Specimen: Blood     Basic metabolic panel [[id number]]  (Abnormal) Collected:  05/08/25 1903    Lab Status: Final result Specimen: Blood from Arm, Right Updated: 05/08/25 1952     Sodium 139 mmol/L      Potassium 4.8 mmol/L      Chloride 98 mmol/L      CO2 30 mmol/L      ANION GAP 11 mmol/L      BUN 25 mg/dL      Creatinine 8.49 mg/dL      Glucose 164 mg/dL      Calcium 9.1 mg/dL      eGFR 7 ml/min/1.73sq m     Narrative:      [employer] guidelines for Chronic Kidney Disease (CKD):     Stage 1 with normal or high GFR (GFR > 90 mL/min/1.73 square meters)    Stage 2 Mild CKD (GFR = 60-89 mL/min/1.73 square meters)    Stage 3A Moderate CKD (GFR = 45-59 mL/min/1.73 square meters)    Stage 3B Moderate CKD (GFR = 30-44 mL/min/1.73 square meters)    Stage 4 Severe CKD (GFR = 15-29 mL/min/1.73 square meters)    Stage 5 End Stage CKD (GFR <15 mL/min/1.73 square meters)  Note: GFR calculation is accurate only with a steady state creatinine    Protime-INR [[id number]]  (Normal) Collected: 05/08/25 1903    Lab Status: Final result Specimen: Blood from Arm, Right Updated: 05/08/25 1948     Protime 13.7 seconds      INR 0.98    Narrative:      INR Therapeutic Range    Indication                                             INR Range      Atrial Fibrillation                                               2.0-3.0  Hypercoagulable State                                    2.0.2.3  Left Ventricular Asist Device                            2.0-3.0  Mechanical Heart Valve                                  -    Aortic(with afib, MI, embolism, HF, LA enlargement,    and/or coagulopathy)                                     2.0-3.0 (2.5-3.5)     Mitral                                                             2.5-3.5  Prosthetic/Bioprosthetic Heart Valve               2.0-3.0  Venous thromboembolism (VTE: VT, PE        2.0-3.0    APTT [[id number]]  (Normal) Collected: 05/08/25 1903    Lab Status: Final result Specimen: Blood from Arm, Right Updated: 05/08/25 1948     PTT 32 seconds     CBC and  Platelet [[id number]]  (Abnormal) Collected: 05/08/25 1903    Lab Status: Final result Specimen: Blood from Arm, Right Updated: 05/08/25 1933     WBC 5.34 Thousand/uL      RBC 3.51 Million/uL      Hemoglobin 11.7 g/dL      Hematocrit 34.8 %      MCV 99 fL      MCH 33.3 pg      MCHC 33.6 g/dL      RDW 15.1 %      Platelets 256 Thousands/uL      MPV 10.0 fL     Fingerstick Glucose (POCT) [[id number]]  (Abnormal) Collected: 05/08/25 1923    Lab Status: Final result Specimen: Blood Updated: 05/08/25 1924     POC Glucose 182 mg/dl             X-ray chest 1 view portable   ED Interpretation by Aime Gee MD (05/08 2013)   My personal interpretation-no acute cardiopulmonary disease appreciated.      CT stroke alert brain   Final Interpretation by Chris Jeff MD (05/08 1921)      No acute intracranial abnormality.      Findings were directly discussed with Dio Riley  at approximately 7:14 p.m. on 5/8/2025.      Workstation performed: [id number]         CTA stroke alert (head/neck)   Final Interpretation by Chris Jeff MD (05/08 1920)   No large vessel occlusion, high-grade stenosis, or intracranial aneurysm identified on CT angiogram of the head. Stable moderate stenosis of the left cavernous and supraclinoid internal carotid artery due to atherosclerotic plaque.      No hemodynamically significant stenosis or dissection identified on CT angiogram of the neck.      Mildly enlarged main pulmonary artery, indicative of a pulmonary hypertension.            Findings were directly discussed with Dio Riley  at 7:20 p.m. on 5/8/2025.      Workstation performed: [id number]             ECG 12 Lead Documentation Only    Date/Time: 5/8/2025 8:14 PM    Performed by: Aime Gee MD  Authorized by: Aime Gee MD    Patient location:  ED  Interpretation:     Interpretation: normal    Rate:     ECG rate assessment: normal    Rhythm:     Rhythm: sinus rhythm    Ectopy:     Ectopy: none    QRS:     QRS axis:  Normal    QRS  intervals:  Normal  Conduction:     Conduction: normal    ST segments:     ST segments:  Normal  T waves:     T waves: normal        ED Medication and Procedure Management   Prior to Admission Medications   Prescriptions Last Dose Informant Patient Reported? Taking?   GLUCAGON EMERGENCY 1 MG injection  Self, Family Member Yes No   Gvoke HypoPen 1-Pack 1 MG/0.2ML SOAJ  Self, Family Member Yes No   Sig: as needed   Insulin Disposable Pump (Omnipod 5 G6 Intro, Gen 5,) KIT  Self, Family Member Yes No   Insulin Disposable Pump (Omnipod 5 G6 Pod, Gen 5,) MISC  Self, Family Member Yes No   NIFEdipine (PROCARDIA XL) 90 mg 24 hr tablet  Self, Family Member No No   Sig: Take 1 tablet (90 mg total) by mouth 2 (two) times a day   NovoLOG 100 UNIT/ML injection  Self, Family Member Yes No   aspirin (ECOTRIN LOW STRENGTH) 81 mg EC tablet  Self, Family Member Yes No   Sig: Take 81 mg by mouth daily   atorvastatin (LIPITOR) 40 mg tablet  Self, Family Member No No   Sig: TAKE 1 TABLET BY MOUTH ONCE DAILY IN THE EVENING   b complex vitamins capsule  Self, Family Member Yes No   Sig: Take 1 capsule by mouth daily before lunch    calcium acetate (PHOSLO) capsule  Self, Family Member No No   Sig: Take 1 capsule (667 mg total) by mouth 3 (three) times a day   cinacalcet (SENSIPAR) 60 MG tablet  Self, Family Member No No   Sig: Take 1 tablet (60 mg total) by mouth daily   cloNIDine (CATAPRES) 0.2 mg tablet  Self, Family Member No No   Sig: Take 1 tablet (0.2 mg total) by mouth every 8 (eight) hours   escitalopram (LEXAPRO) 20 mg tablet   No No   Sig: Take 1 tablet (20 mg total) by mouth daily   gabapentin (NEURONTIN) 100 mg capsule   No No   Sig: TAKE 1 CAPSULE BY MOUTH IN THE MORNING AND 2 CAPSULES AT BEDTIME   hydrALAZINE (APRESOLINE) 100 MG tablet  Self, Family Member No No   Sig: Take 1 tablet (100 mg total) by mouth every 8 (eight) hours   hydrOXYzine HCL (ATARAX) 10 mg tablet  Self, Family Member No No   Sig: Take 1 tablet (10 mg  total) by mouth every 6 (six) hours as needed for itching or anxiety   labetalol (NORMODYNE) 200 mg tablet  Self, Family Member No No   Sig: Take 2 tablets (400 mg total) by mouth 3 (three) times a day   Patient taking differently: Take 200 mg by mouth Take 200 mg at 2 pm, 400 mg at 5 pm and 400 mg at 10 pm   olmesartan (BENICAR) 40 mg tablet  Self, Family Member Yes No   Sig: Take 40 mg by mouth daily   ondansetron (ZOFRAN) 4 mg tablet  Self, Family Member No No   Sig: Take 1 tablet (4 mg total) by mouth every 8 (eight) hours as needed for nausea or vomiting   torsemide (DEMADEX) 100 mg tablet  Self, Family Member No No   Sig: Take 1 tablet (100 mg total) by mouth daily Do not start before January 8, 2025.   traZODone (DESYREL) 50 mg tablet   No No   Sig: Take 1 tablet (50 mg total) by mouth daily at bedtime as needed for sleep      Facility-Administered Medications: None     Patient's Medications   Discharge Prescriptions    No medications on file     No discharge procedures on file.  ED SEPSIS DOCUMENTATION   Time reflects when diagnosis was documented in both MDM as applicable and the Disposition within this note       Time User Action Codes Description Comment    5/8/2025  7:00 PM Aime Gee [R29.90] Stroke-like symptom     5/8/2025  7:28 PM Aime Gee [R42] Dizziness     5/8/2025  7:28 PM Aime Gee [R51.9] Headache     5/8/2025  9:20 PM Aime Gee [I10] Hypertension                  Aime Gee MD  05/08/25 8257

## 2025-05-08 NOTE — QUICK NOTE
Stroke alert called at 659PM  Neurology response at 659PM    Brief HPI: 39M with a PMH of ESRD, SAH, RACHEAL, CVA, hypertensive urgency, depression who presented with persistent dizziness and headache that was present this AM around 7AM. Constant room spinning dizziness. No other complaints. Compliant with daily Aspirin. Headache 4/10 on the pain scale and not described as the worst headache of his life.    Last known well: 7AM  NIHSS 1 - residual from prior stroke    CTH - no acute findings  CTA - no LVO or other acute findings    IV thrombolysis was not given due to low NIHSS with non-disabling symptoms  - Recommend continuing Aspirin 81mg daily  - Goal map greater than 100, SBP less than 210  - Admit to stroke pathway    I discussed this case with the managing team from a remote location. I did not personally see or examine this patient. I have provided limited recommendations as above, but ultimate patient disposition as per managing team.

## 2025-05-08 NOTE — ED ATTENDING ATTESTATION
5/8/2025   IDeb MD, saw and evaluated the patient. I have discussed the patient with the resident/non-physician practitioner and agree with the resident's/non-physician practitioner's findings, Plan of Care, and MDM as documented in the resident's/non-physician practitioner's note, except where noted. All available labs and Radiology studies were reviewed.  I was present for key portions of any procedure(s) performed by the resident/non-physician practitioner and I was immediately available to provide assistance.       At this point I agree with the current assessment done in the Emergency Department.  I have conducted an independent evaluation of this patient a history and physical is as follows:    Unit/Bed#: ED-36 Encounter: [id number]    Chief Complaint   Patient presents with    Dizziness     Pt reports he took an espresso shot this morning and started to feel dizzy like the room is spinning. Patient reports hx of strokes.      39 y.o. male with complex past medical history including hypertension, hyperlipidemia, diabetes mellitus, ESRD on hemodialysis, prior strokes, presenting with vertigo which is similar to prior stroke.  Patient reports waking up this morning, drinking some coffee.  He developed significant vertigo and has had a headache throughout the day.  He has residual right upper extremity weakness from a prior stroke.  No new focal weakness or numbness.  Unable to ambulate due to the vertigo.  Headache was gradual in onset.    Physical Exam  ED Triage Vitals [05/08/25 1842]   Temperature Pulse Respirations Blood Pressure SpO2   97.8 °F (36.6 °C) 67 18 (!) 236/107 100 %      Temp Source Heart Rate Source Patient Position - Orthostatic VS BP Location FiO2 (%)   Oral Monitor Sitting Right arm --      Pain Score       4           Vital signs and nursing notes reviewed    CONSTITUTIONAL: male appearing stated age resting in bed, in no acute distress  HEENT: atraumatic, normocephalic. Sclera  anicteric, conjunctiva are not injected. Moist oral mucosa  CARDIOVASCULAR/CHEST: RRR, no M/R/G. 2+ radial pulses  PULMONARY: Breathing comfortably on RA. Breath sounds are equal and clear to auscultation  ABDOMEN: non-distended. BS present, normoactive. Non-tender  MSK: moves all extremities, no deformities, no peripheral edema, no calf asymmetry  NEURO: Awake, alert, and oriented x 3.  Pupils 2 mm and reactive, extraocular movements are intact, no nystagmus.  Face symmetric.  No dysarthria.  4/5 strength in right upper extremity.  Decree sensation to light touch in bilateral lower extremities distally in setting of known diabetic neuropathy.    SKIN: Warm, appears well-perfused  MENTAL STATUS: Normal affect      Labs and Imaging  Labs Reviewed   BASIC METABOLIC PANEL - Abnormal       Result Value Ref Range Status    Sodium 139  135 - 147 mmol/L Final    Potassium 4.8  3.5 - 5.3 mmol/L Final    Chloride 98  96 - 108 mmol/L Final    CO2 30  21 - 32 mmol/L Final    ANION GAP 11  4 - 13 mmol/L Final    BUN 25  5 - 25 mg/dL Final    Creatinine 8.49 (*) 0.60 - 1.30 mg/dL Final    Comment: Standardized to IDMS reference method    Glucose 164 (*) 65 - 140 mg/dL Final    Comment: If the patient is fasting, the ADA then defines impaired fasting glucose as > 100 mg/dL and diabetes as > or equal to 123 mg/dL.    Calcium 9.1  8.4 - 10.2 mg/dL Final    eGFR 7  ml/min/1.73sq m Final    Narrative:     [employer] guidelines for Chronic Kidney Disease (CKD):     Stage 1 with normal or high GFR (GFR > 90 mL/min/1.73 square meters)    Stage 2 Mild CKD (GFR = 60-89 mL/min/1.73 square meters)    Stage 3A Moderate CKD (GFR = 45-59 mL/min/1.73 square meters)    Stage 3B Moderate CKD (GFR = 30-44 mL/min/1.73 square meters)    Stage 4 Severe CKD (GFR = 15-29 mL/min/1.73 square meters)    Stage 5 End Stage CKD (GFR <15 mL/min/1.73 square meters)  Note: GFR calculation is accurate only with a steady state creatinine  "  CBC AND PLATELET - Abnormal    WBC 5.34  4.31 - 10.16 Thousand/uL Final    RBC 3.51 (*) 3.88 - 5.62 Million/uL Final    Hemoglobin 11.7 (*) 12.0 - 17.0 g/dL Final    Hematocrit 34.8 (*) 36.5 - 49.3 % Final    MCV 99 (*) 82 - 98 fL Final    MCH 33.3  26.8 - 34.3 pg Final    MCHC 33.6  31.4 - 37.4 g/dL Final    RDW 15.1  11.6 - 15.1 % Final    Platelets 256  149 - 390 Thousands/uL Final    MPV 10.0  8.9 - 12.7 fL Final   POCT GLUCOSE - Abnormal    POC Glucose 182 (*) 65 - 140 mg/dl Final    Comment: RN NOTIFIED/CRIT KARISHMA   PROTIME-INR - Normal    Protime 13.7  12.3 - 15.0 seconds Final    INR 0.98  0.85 - 1.19 Final    Narrative:     INR Therapeutic Range    Indication                                             INR Range      Atrial Fibrillation                                               2.0-3.0  Hypercoagulable State                                    2.0.2.3  Left Ventricular Asist Device                            2.0-3.0  Mechanical Heart Valve                                  -    Aortic(with afib, MI, embolism, HF, LA enlargement,    and/or coagulopathy)                                     2.0-3.0 (2.5-3.5)     Mitral                                                             2.5-3.5  Prosthetic/Bioprosthetic Heart Valve               2.0-3.0  Venous thromboembolism (VTE: VT, PE        2.0-3.0   APTT - Normal    PTT 32  23 - 34 seconds Final    Comment: Therapeutic Heparin Range =  60-90 seconds   HS TROPONIN I 0HR - Normal    hs TnI 0hr 24  \"Refer to ACS Flowchart\"- see link ng/L Final    Comment:                                              Initial (time 0) result  If >=50 ng/L, Myocardial injury suggested ;  Type of myocardial injury and treatment strategy  to be determined.  If 5-49 ng/L, a delta result at 2 hours and or 4 hours will be needed to further evaluate.  If <4 ng/L, and chest pain has been >3 hours since onset, patient may qualify for discharge based on the HEART score in the ED.  If <5 ng/L " and <3hours since onset of chest pain, a delta result at 2 hours will be needed to further evaluate.    HS Troponin 99th Percentile URL of a Health Population=12 ng/L with a 95% Confidence Interval of 8-18 ng/L.    Second Troponin (time 2 hours)  If calculated delta >= 20 ng/L,  Myocardial injury suggested ; Type of myocardial injury and treatment strategy to be determined.  If 5-49 ng/L and the calculated delta is 5-19 ng/L, consult medical service for evaluation.  Continue evaluation for ischemia on ecg and other possible etiology and repeat hs troponin at 4 hours.  If delta is <5 ng/L at 2 hours, consider discharge based on risk stratification via the HEART score (if in ED), or GERMAINE risk score in IP/Observation.    HS Troponin 99th Percentile URL of a Health Population=12 ng/L with a 95% Confidence Interval of 8-18 ng/L.   HS TROPONIN I 2HR   HS TROPONIN I 4HR       X-ray chest 1 view portable   ED Interpretation   My personal interpretation-no acute cardiopulmonary disease appreciated.      CT stroke alert brain   Final Result      No acute intracranial abnormality.      Findings were directly discussed with Dio Riley  at approximately 7:14 p.m. on 5/8/2025.      Workstation performed: [id number]         CTA stroke alert (head/neck)   Final Result   No large vessel occlusion, high-grade stenosis, or intracranial aneurysm identified on CT angiogram of the head. Stable moderate stenosis of the left cavernous and supraclinoid internal carotid artery due to atherosclerotic plaque.      No hemodynamically significant stenosis or dissection identified on CT angiogram of the neck.      Mildly enlarged main pulmonary artery, indicative of a pulmonary hypertension.            Findings were directly discussed with Dio Riley  at 7:20 p.m. on 5/8/2025.      Workstation performed: [id number]               Procedures  ECG 12 Lead Documentation Only    Date/Time: 5/8/2025 7:07 PM    Performed by: Deb Jones  MD  Authorized by: Deb Jones MD    Comments:      Normal sinus rhythm, ventricular rate 66, OH interval 168, QRS 76, QTc 465, normal axis, nonspecific ST/T wave abnormalities in lateral precordial leads, overall, no significant change in prior EKG dated 1/17/25.          ED Course  Medications   iohexol (OMNIPAQUE) 350 MG/ML injection (MULTI-DOSE) 85 mL (85 mL Intravenous Given 5/8/25 1910)   acetaminophen (TYLENOL) tablet 975 mg (975 mg Oral Given 5/8/25 1921)   labetalol (NORMODYNE) tablet 400 mg (400 mg Oral Given 5/8/25 1946)   hydrALAZINE (APRESOLINE) tablet 100 mg (100 mg Oral Given 5/8/25 1947)     39-year-old male presenting with vertigo and headache since this morning.  Vital signs reviewed, markedly hypertensive, within normal limits otherwise.  Differential diagnosis includes peripheral vertigo, central vertigo, hypertensive emergency, versus another etiology of symptoms.  Stroke workup initiated.  Findings are as above, no LVO, stable findings noted.  Patient is out of window for tenecteplase.  Tylenol administered for headache.  Labetalol  and hydralazine administered for elevated blood pressures.  EKG to my review is as above.  Labs as above.  Patient admitted to the hospital for further care.

## 2025-05-09 ENCOUNTER — APPOINTMENT (INPATIENT)
Dept: DIALYSIS | Facility: HOSPITAL | Age: 40
DRG: 304 | End: 2025-05-09
Payer: MEDICARE

## 2025-05-09 PROBLEM — E83.9 CHRONIC KIDNEY DISEASE-MINERAL BONE DISORDER (CKD-MBD) WITH STAGE 5 CHRONIC KIDNEY DISEASE, ON CHRONIC DIALYSIS (HCC): Status: ACTIVE | Noted: 2025-05-09

## 2025-05-09 PROBLEM — M89.9 CHRONIC KIDNEY DISEASE-MINERAL BONE DISORDER (CKD-MBD) WITH STAGE 5 CHRONIC KIDNEY DISEASE, ON CHRONIC DIALYSIS (HCC): Status: ACTIVE | Noted: 2025-05-09

## 2025-05-09 PROBLEM — N18.5 CHRONIC KIDNEY DISEASE-MINERAL BONE DISORDER (CKD-MBD) WITH STAGE 5 CHRONIC KIDNEY DISEASE, ON CHRONIC DIALYSIS (HCC): Status: ACTIVE | Noted: 2025-05-09

## 2025-05-09 PROBLEM — Z99.2 CHRONIC KIDNEY DISEASE-MINERAL BONE DISORDER (CKD-MBD) WITH STAGE 5 CHRONIC KIDNEY DISEASE, ON CHRONIC DIALYSIS (HCC): Status: ACTIVE | Noted: 2025-05-09

## 2025-05-09 PROBLEM — D63.1 ANEMIA IN ESRD (END-STAGE RENAL DISEASE)  (HCC): Status: ACTIVE | Noted: 2025-05-09

## 2025-05-09 PROBLEM — N18.6 ANEMIA IN ESRD (END-STAGE RENAL DISEASE)  (HCC): Status: ACTIVE | Noted: 2025-05-09

## 2025-05-09 LAB
4HR DELTA HS TROPONIN: 3 NG/L
ALBUMIN SERPL BCG-MCNC: 4.1 G/DL (ref 3.5–5)
ALP SERPL-CCNC: 70 U/L (ref 34–104)
ALT SERPL W P-5'-P-CCNC: 11 U/L (ref 7–52)
AST SERPL W P-5'-P-CCNC: 18 U/L (ref 13–39)
ATRIAL RATE: 66 BPM
BASOPHILS # BLD AUTO: 0.06 THOUSANDS/ÂΜL (ref 0–0.1)
BASOPHILS NFR BLD AUTO: 1 % (ref 0–1)
BILIRUB DIRECT SERPL-MCNC: 0.08 MG/DL (ref 0–0.2)
BILIRUB SERPL-MCNC: 0.5 MG/DL (ref 0.2–1)
CARDIAC TROPONIN I PNL SERPL HS: 27 NG/L (ref ?–50)
CHOLEST SERPL-MCNC: 93 MG/DL (ref ?–200)
EOSINOPHIL # BLD AUTO: 0.32 THOUSAND/ÂΜL (ref 0–0.61)
EOSINOPHIL NFR BLD AUTO: 6 % (ref 0–6)
ERYTHROCYTE [DISTWIDTH] IN BLOOD BY AUTOMATED COUNT: 15.1 % (ref 11.6–15.1)
EST. AVERAGE GLUCOSE BLD GHB EST-MCNC: 111 MG/DL
GLUCOSE SERPL-MCNC: 134 MG/DL (ref 65–140)
GLUCOSE SERPL-MCNC: 149 MG/DL (ref 65–140)
GLUCOSE SERPL-MCNC: 197 MG/DL (ref 65–140)
GLUCOSE SERPL-MCNC: 250 MG/DL (ref 65–140)
HBA1C MFR BLD: 5.5 %
HCT VFR BLD AUTO: 36.5 % (ref 36.5–49.3)
HDLC SERPL-MCNC: 39 MG/DL
HGB BLD-MCNC: 11.9 G/DL (ref 12–17)
IMM GRANULOCYTES # BLD AUTO: 0.02 THOUSAND/UL (ref 0–0.2)
IMM GRANULOCYTES NFR BLD AUTO: 0 % (ref 0–2)
LDLC SERPL CALC-MCNC: 33 MG/DL (ref 0–100)
LYMPHOCYTES # BLD AUTO: 1.68 THOUSANDS/ÂΜL (ref 0.6–4.47)
LYMPHOCYTES NFR BLD AUTO: 33 % (ref 14–44)
MCH RBC QN AUTO: 33 PG (ref 26.8–34.3)
MCHC RBC AUTO-ENTMCNC: 32.6 G/DL (ref 31.4–37.4)
MCV RBC AUTO: 101 FL (ref 82–98)
MONOCYTES # BLD AUTO: 0.45 THOUSAND/ÂΜL (ref 0.17–1.22)
MONOCYTES NFR BLD AUTO: 9 % (ref 4–12)
NEUTROPHILS # BLD AUTO: 2.55 THOUSANDS/ÂΜL (ref 1.85–7.62)
NEUTS SEG NFR BLD AUTO: 51 % (ref 43–75)
NRBC BLD AUTO-RTO: 0 /100 WBCS
P AXIS: 57 DEGREES
PLATELET # BLD AUTO: 225 THOUSANDS/UL (ref 149–390)
PLATELET # BLD AUTO: 231 THOUSANDS/UL (ref 149–390)
PMV BLD AUTO: 9.5 FL (ref 8.9–12.7)
PMV BLD AUTO: 9.9 FL (ref 8.9–12.7)
PR INTERVAL: 168 MS
PROT SERPL-MCNC: 5.9 G/DL (ref 6.4–8.4)
QRS AXIS: 43 DEGREES
QRSD INTERVAL: 76 MS
QT INTERVAL: 444 MS
QTC INTERVAL: 465 MS
RBC # BLD AUTO: 3.61 MILLION/UL (ref 3.88–5.62)
T WAVE AXIS: 25 DEGREES
TRIGL SERPL-MCNC: 106 MG/DL (ref ?–150)
VENTRICULAR RATE: 66 BPM
WBC # BLD AUTO: 5.08 THOUSAND/UL (ref 4.31–10.16)

## 2025-05-09 PROCEDURE — 82948 REAGENT STRIP/BLOOD GLUCOSE: CPT

## 2025-05-09 PROCEDURE — 99222 1ST HOSP IP/OBS MODERATE 55: CPT | Performed by: INTERNAL MEDICINE

## 2025-05-09 PROCEDURE — 97163 PT EVAL HIGH COMPLEX 45 MIN: CPT

## 2025-05-09 PROCEDURE — 83036 HEMOGLOBIN GLYCOSYLATED A1C: CPT

## 2025-05-09 PROCEDURE — 80061 LIPID PANEL: CPT

## 2025-05-09 PROCEDURE — 85049 AUTOMATED PLATELET COUNT: CPT

## 2025-05-09 PROCEDURE — 97167 OT EVAL HIGH COMPLEX 60 MIN: CPT

## 2025-05-09 PROCEDURE — 80076 HEPATIC FUNCTION PANEL: CPT

## 2025-05-09 PROCEDURE — 84484 ASSAY OF TROPONIN QUANT: CPT

## 2025-05-09 PROCEDURE — 99223 1ST HOSP IP/OBS HIGH 75: CPT | Performed by: INTERNAL MEDICINE

## 2025-05-09 PROCEDURE — 99214 OFFICE O/P EST MOD 30 MIN: CPT | Performed by: PSYCHIATRY & NEUROLOGY

## 2025-05-09 PROCEDURE — 5A1D70Z PERFORMANCE OF URINARY FILTRATION, INTERMITTENT, LESS THAN 6 HOURS PER DAY: ICD-10-PCS | Performed by: INTERNAL MEDICINE

## 2025-05-09 PROCEDURE — 85025 COMPLETE CBC W/AUTO DIFF WBC: CPT

## 2025-05-09 PROCEDURE — 93010 ELECTROCARDIOGRAM REPORT: CPT | Performed by: STUDENT IN AN ORGANIZED HEALTH CARE EDUCATION/TRAINING PROGRAM

## 2025-05-09 PROCEDURE — 90935 HEMODIALYSIS ONE EVALUATION: CPT | Performed by: INTERNAL MEDICINE

## 2025-05-09 RX ORDER — CALCITRIOL 0.25 UG/1
0.75 CAPSULE, LIQUID FILLED ORAL 3 TIMES WEEKLY
Status: DISCONTINUED | OUTPATIENT
Start: 2025-05-09 | End: 2025-05-10 | Stop reason: HOSPADM

## 2025-05-09 RX ORDER — HYDRALAZINE HYDROCHLORIDE 20 MG/ML
20 INJECTION INTRAMUSCULAR; INTRAVENOUS ONCE
Status: COMPLETED | OUTPATIENT
Start: 2025-05-09 | End: 2025-05-09

## 2025-05-09 RX ADMIN — ASPIRIN 81 MG: 81 TABLET ORAL at 08:06

## 2025-05-09 RX ADMIN — CALCIUM ACETATE 667 MG: 667 CAPSULE ORAL at 19:09

## 2025-05-09 RX ADMIN — ESCITALOPRAM OXALATE 20 MG: 20 TABLET ORAL at 08:06

## 2025-05-09 RX ADMIN — HYDRALAZINE HYDROCHLORIDE 100 MG: 25 TABLET ORAL at 21:11

## 2025-05-09 RX ADMIN — ATORVASTATIN CALCIUM 40 MG: 40 TABLET, FILM COATED ORAL at 19:09

## 2025-05-09 RX ADMIN — CLONIDINE HYDROCHLORIDE 0.2 MG: 0.1 TABLET ORAL at 21:09

## 2025-05-09 RX ADMIN — HEPARIN SODIUM 5000 UNITS: 5000 INJECTION INTRAVENOUS; SUBCUTANEOUS at 05:49

## 2025-05-09 RX ADMIN — LABETALOL HYDROCHLORIDE 200 MG: 100 TABLET, FILM COATED ORAL at 08:06

## 2025-05-09 RX ADMIN — CINACALCET HYDROCHLORIDE 60 MG: 30 TABLET, FILM COATED ORAL at 08:06

## 2025-05-09 RX ADMIN — NIFEDIPINE 90 MG: 30 TABLET, FILM COATED, EXTENDED RELEASE ORAL at 08:08

## 2025-05-09 RX ADMIN — LABETALOL HYDROCHLORIDE 400 MG: 100 TABLET, FILM COATED ORAL at 21:10

## 2025-05-09 RX ADMIN — HYDRALAZINE HYDROCHLORIDE 20 MG: 20 INJECTION INTRAMUSCULAR; INTRAVENOUS at 01:54

## 2025-05-09 RX ADMIN — GABAPENTIN 200 MG: 100 CAPSULE ORAL at 21:10

## 2025-05-09 RX ADMIN — CALCIUM ACETATE 667 MG: 667 CAPSULE ORAL at 08:21

## 2025-05-09 RX ADMIN — HYDRALAZINE HYDROCHLORIDE 100 MG: 25 TABLET ORAL at 13:50

## 2025-05-09 RX ADMIN — TORSEMIDE 100 MG: 100 TABLET ORAL at 08:06

## 2025-05-09 RX ADMIN — HEPARIN SODIUM 5000 UNITS: 5000 INJECTION INTRAVENOUS; SUBCUTANEOUS at 13:50

## 2025-05-09 RX ADMIN — NIFEDIPINE 90 MG: 30 TABLET, FILM COATED, EXTENDED RELEASE ORAL at 21:11

## 2025-05-09 RX ADMIN — GABAPENTIN 100 MG: 100 CAPSULE ORAL at 08:06

## 2025-05-09 RX ADMIN — CLONIDINE HYDROCHLORIDE 0.2 MG: 0.1 TABLET ORAL at 08:07

## 2025-05-09 RX ADMIN — HYDRALAZINE HYDROCHLORIDE 100 MG: 25 TABLET ORAL at 05:49

## 2025-05-09 RX ADMIN — HEPARIN SODIUM 5000 UNITS: 5000 INJECTION INTRAVENOUS; SUBCUTANEOUS at 21:11

## 2025-05-09 RX ADMIN — LOSARTAN POTASSIUM 100 MG: 50 TABLET, FILM COATED ORAL at 08:06

## 2025-05-09 RX ADMIN — CALCITRIOL CAPSULES 0.25 MCG 0.75 MCG: 0.25 CAPSULE ORAL at 10:28

## 2025-05-09 RX ADMIN — TRAZODONE HYDROCHLORIDE 50 MG: 50 TABLET ORAL at 21:19

## 2025-05-09 NOTE — H&P
H&P - Hospitalist   Name: Mateus Mckeon 39 y.o. 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  Stable moderate stenosis of the left cavernous and supraclinoid internal carotid artery.  No significant stenosis or dissection of neck  Neurology consult: Recommend admission along stroke pathway    Plan:  Continue aspirin and atorvastatin  Permissive hypertension, as per neurology: Maintain SBP less than 210, and MAP above 100  MRI brain ordered  Check lipid panel  Check A1c  Will defer echo for now as patient recently had echo in November which was unremarkable  Every hour vital sign and neuro checks for 4 hours, then every 2 hour for 8 hours, and vitals every 4 for 72 hours  Hypertensive urgency  Hypertensive up to 240/114 on admission, on extensive hypertensive regimen at home and indicates compliance with these medications  No recent change of diet, physical activity, or stress to explain acute rise of blood pressure  Blood pressure is regularly checked at home and baseline appears to be around 180 SBP  PTA meds:  Clonidine 0.2 mg twice daily  Labetalol 200 mg in the morning, 400 mg at 5 PM, and 400 mg at bedtime  Losartan 100 mg daily  Nifedipine 90 mg twice daily  Torsemide 100 mg daily  Given 400 mg labetalol, and 100 mg hydralazine orally in the ED    Plan:  As per neurology, maintain permissive hypertension with SBP goal of less than  210, and MAP above 100  Continue home medications while inpatient  Given additional 10 mg hydralazine IV dose  ESRD (end stage renal disease) on dialysis ([facility])  Lab Results   Component Value Date    EGFR 7 05/08/2025    EGFR 6 01/17/2025    EGFR 5 01/13/2025    CREATININE 8.49 (H) 05/08/2025    CREATININE 9.42 (H) 01/17/2025    CREATININE 10.77 (H) 01/13/2025   Creatinine stable  Receives dialysis every MWF    Plan:  Nephrology consult in place, plan to initiate dialysis 5/9  Type 1 diabetes ([facility])  Lab Results   Component Value Date    HGBA1C 6.9 (H) 11/05/2024       Recent Labs     05/08/25  1923 05/09/25  0356   POCGLU 182* 134       Blood Sugar Average: Last 72 hrs:  (P) 158    Blood sugar stable  Check A1c  Patient has insulin pump    Plan:  Carb controlled diet  Will continue insulin pump while inpatient  See media for settings     VTE Pharmacologic Prophylaxis: VTE Score: 10 High Risk (Score >/= 5) - Pharmacological DVT Prophylaxis Ordered: heparin. Sequential Compression Devices Ordered.  Code Status: Level 1 - Full Code discussed with patient   Discussion with family: Updated  (father and mother) at bedside.    Anticipated Length of Stay: Patient will be admitted on an inpatient basis with an anticipated length of stay of greater than 2 midnights secondary to stroke workup.    History of Present Illness   Chief Complaint: dizziness and headache    Mateus Mckeon is a 39 y.o. male with a PMH of ESRD on dialysis, SAH, RACHEAL, HTN, GERD, and 3 prior CVA's who presents with dizziness and headache which began earlier today.  Patient states he woke up began to feel immediately dizzy sensation of the room spinning around him, due to this he began to feel nauseous.  He does state this felt similar to his prior CVAs.  He also endorsed presence of a headache primarily located in the occipital region associated with photophobia.  Patient regularly mentions his blood pressure at home which both he and  his parents state normally runs around 180s systolically.  When he measured his blood pressure today it was in the 200s which prompted them to come to the ED for further evaluation.  Patient and family state compliance with patient's home medications.  He denies any recent change in physical activity, diet, or medications.  He receives dialysis every MWF most recently yesterday, and tolerated dialysis well without any blood pressure issues.  On my evaluation patient continues to endorse occipital headache as well as dizziness and nausea.  He also endorses right upper extremity weakness however this is baseline for him given his prior CVAs. He does also endorse chronic neuropathy present in legs bilaterally.     Review of Systems   Constitutional:  Negative for chills and fever.   Eyes:  Negative for photophobia and visual disturbance.   Respiratory:  Negative for chest tightness and shortness of breath.    Cardiovascular:  Negative for chest pain and palpitations.   Gastrointestinal:  Positive for nausea and vomiting. Negative for abdominal pain and diarrhea.   Neurological:  Positive for dizziness, weakness and headaches. Negative for light-headedness and numbness.       Historical Information   Past Medical History:   Diagnosis Date    Acute kidney injury (HCC)     Ambulates with cane     Anuria     Anxiety     Cellulitis of right elbow 03/31/2021    Chronic kidney disease     COVID-19 10/18/2024    Depression     Diabetes mellitus (HCC)     Diarrhea     Emesis 10/24/2020    End stage renal disease (HCC) 02/11/2018    Formatting of this note might be different from the original. Last Assessment & Plan:  Secondary to DM.  On nightly PD.  Followed by Nephro.  [facility] Formatting of this note might be different from the original. Last Assessment & Plan:  Formatting of this note might be different from the original. Lab Results  Component Value Date   EGFR      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    Esophagitis 07/21/2015    Falls     Gastroparesis     GERD (gastroesophageal reflux disease)     History of shingles 2010    History of transfusion 02/2018    no adverse reaction    Hyperlipidemia     Hyperphosphatemia     Hypertension     Hypoglycemia 07/15/2022    Itching     Mastoiditis of right side 07/15/2022    Muscle weakness     general unsteadiness    Obesity (BMI 30.0-34.9) 09/09/2019    Orthostatic hypotension 10/25/2020    Peripheral polyneuropathy 11/20/2019    PONV (postoperative nausea and vomiting) 01/26/2018    Protein-calorie malnutrition (HCC) 11/23/2020    Recurrent peritonitis (HCC) due to peritoneal dialysis catheter 07/31/2020    Retinopathy     Seizures (HCC)     early 2020 - one time    Skin abnormality     some dime size areas where skin was scratched from itching    Sleep apnea     Spontaneous bacterial peritonitis (HCC) 10/19/2020    Squamous cell skin cancer     left temple    Stroke (HCC)     x2 - off balance/no driving/fatigue    Swelling of both lower extremities     Swelling of joint of upper arm, right 04/03/2024    Traumatic onycholysis 07/21/2022    Vomiting     Wears glasses     Word finding difficulty 11/05/2024     Past Surgical History:   Procedure Laterality Date    CARDIAC ELECTROPHYSIOLOGY PROCEDURE N/A 9/21/2023    Procedure: Cardiac loop recorder explant;  Surgeon: Parish Morgan MD;  Location: [facility];  Service: Cardiology    CARDIAC LOOP RECORDER  05/2018    COLONOSCOPY      EGD      EYE SURGERY Right     HEMODIALYSIS ADULT  11/6/2024    IR AV FISTULAGRAM/GRAFTOGRAM  02/23/2021    IR CEREBRAL ANGIOGRAPHY  1/12/2024    IR CEREBRAL ANGIOGRAPHY / INTERVENTION  1/5/2024    IR TUNNELED CENTRAL LINE PLACEMENT  02/16/2021    IR TUNNELED DIALYSIS CATHETER PLACEMENT  11/18/2020    IR TUNNELED DIALYSIS CATHETER REMOVAL  02/12/2021    IR TUNNELED DIALYSIS CATHETER REMOVAL  03/11/2021    MOHS SURGERY Left 12/14/2022    Left  leonor with Dr. Hassan    PERITONEAL CATHETER INSERTION N/A 2018    Procedure: UNROOF PD CATHETER;  Surgeon: Felipe Lindo DO;  Location: [facility];  Service: General    CT ARTERIOVENOUS ANASTOMOSIS OPEN DIRECT Left 2020    Procedure: CREATION FISTULA  ARTERIOVENOUS (AV) - LEFT WRIST;  Surgeon: Placido Altamirano MD;  Location: [facility];  Service: Vascular    CT ESOPHAGOGASTRODUODENOSCOPY TRANSORAL DIAGNOSTIC N/A 2019    Procedure: ESOPHAGOGASTRODUODENOSCOPY (EGD);  Surgeon: Ale Figueroa MD;  Location: [facility];  Service: Gastroenterology    CT LAPS INSERTION TUNNELED INTRAPERITONEAL CATHETER N/A 2018    Procedure: LAPAROSCOPIC PD CATHETER PLACEMENT;  Surgeon: Felipe Lindo DO;  Location: [facility];  Service: General    CT REMOVAL TUNNELED INTRAPERITONEAL CATHETER N/A 2020    Procedure: REMOVAL CATHETER PERITONEAL DIALYSIS;  Surgeon: Abdifatah Ty MD;  Location: [facility];  Service: General    TONSILLECTOMY      UPPER GASTROINTESTINAL ENDOSCOPY       Social History     Tobacco Use    Smoking status: Former     Current packs/day: 0.00     Average packs/day: 0.5 packs/day for 12.0 years (6.0 ttl pk-yrs)     Types: Cigarettes     Start date: 2006     Quit date: 2018     Years since quittin.2    Smokeless tobacco: Never    Tobacco comments:     quit 2018   Vaping Use    Vaping status: Every Day    Substances: THC, CBD   Substance and Sexual Activity    Alcohol use: Not Currently    Drug use: Yes     Types: Marijuana     Comment: medical marijuana last vaped 2024    Sexual activity: Not Currently     E-Cigarette/Vaping    E-Cigarette Use Current Every Day User     Comments medical marijuana      E-Cigarette/Vaping Substances    Nicotine No     THC Yes     CBD Yes     Flavoring No     Other No     Unknown No        Social History:  Marital Status: Single   Occupation: N/A  Patient Pre-hospital Living Situation: With other family member: Parents  Patient Pre-hospital  Level of Mobility: walks with cane  Patient Pre-hospital Diet Restrictions: None    Meds/Allergies   I have reviewed home medications with patient family member.  Prior to Admission medications    Medication Sig Start Date End Date Taking? Authorizing Provider   aspirin (ECOTRIN LOW STRENGTH) 81 mg EC tablet Take 81 mg by mouth daily    Historical Provider, MD   atorvastatin (LIPITOR) 40 mg tablet TAKE 1 TABLET BY MOUTH ONCE DAILY IN THE EVENING 2/28/25   Dania Sher DO   b complex vitamins capsule Take 1 capsule by mouth daily before lunch     Historical Provider, MD   calcium acetate (PHOSLO) capsule Take 1 capsule (667 mg total) by mouth 3 (three) times a day 1/23/24   Court Edwards MD   cinacalcet (SENSIPAR) 60 MG tablet Take 1 tablet (60 mg total) by mouth daily 1/23/24   Court Edwards MD   cloNIDine (CATAPRES) 0.2 mg tablet Take 1 tablet (0.2 mg total) by mouth every 8 (eight) hours 1/13/25   Yaneli Phillips MD   escitalopram (LEXAPRO) 20 mg tablet Take 1 tablet (20 mg total) by mouth daily 5/1/25   Pedrito Bermeo MD   gabapentin (NEURONTIN) 100 mg capsule TAKE 1 CAPSULE BY MOUTH IN THE MORNING AND 2 CAPSULES AT BEDTIME 5/8/25   Dania Sher DO   GLUCAGON EMERGENCY 1 MG injection  7/24/19   Historical Provider, MD Veliz HypoPen 1-Pack 1 MG/0.2ML SOAJ as needed 8/10/22   Historical Provider, MD   hydrALAZINE (APRESOLINE) 100 MG tablet Take 1 tablet (100 mg total) by mouth every 8 (eight) hours 1/7/25 4/29/25  Stephany José MD   hydrOXYzine HCL (ATARAX) 10 mg tablet Take 1 tablet (10 mg total) by mouth every 6 (six) hours as needed for itching or anxiety 12/7/22   Pedrito Bermeo MD   Insulin Disposable Pump (Omnipod 5 G6 Intro, Gen 5,) KIT  1/17/23   Historical Provider, MD   Insulin Disposable Pump (Omnipod 5 G6 Pod, Gen 5,) MISC  3/24/23   Historical Provider, MD   labetalol (NORMODYNE) 200 mg tablet Take 2 tablets (400 mg total) by mouth 3 (three) times a day  Patient  taking differently: Take 200 mg by mouth Take 200 mg at 2 pm, 400 mg at 5 pm and 400 mg at 10 pm 1/7/25   Stephany José MD   NIFEdipine (PROCARDIA XL) 90 mg 24 hr tablet Take 1 tablet (90 mg total) by mouth 2 (two) times a day 1/13/25   Yaneli Phillips MD   NovoLOG 100 UNIT/ML injection  1/5/23   Historical Provider, MD   olmesartan (BENICAR) 40 mg tablet Take 40 mg by mouth daily    Historical Provider, MD   ondansetron (ZOFRAN) 4 mg tablet Take 1 tablet (4 mg total) by mouth every 8 (eight) hours as needed for nausea or vomiting 1/17/25   Dania Sher DO   torsemide (DEMADEX) 100 mg tablet Take 1 tablet (100 mg total) by mouth daily Do not start before January 8, 2025. 1/8/25   Stephany José MD   traZODone (DESYREL) 50 mg tablet Take 1 tablet (50 mg total) by mouth daily at bedtime as needed for sleep 5/1/25   Pedrito Bermeo MD   gabapentin (NEURONTIN) 100 mg capsule TAKE 1 CAPSULE BY MOUTH IN THE MORNING AND 2 CAPSULES AT BEDTIME 4/14/25 5/8/25  Dania Sher DO     Allergies   Allergen Reactions    Sulfa Antibiotics Rash       Objective :  Temp:  [97.8 °F (36.6 °C)-98.1 °F (36.7 °C)] 98.1 °F (36.7 °C)  HR:  [58-73] 62  BP: (197-247)/() 211/95  Resp:  [17-18] 17  SpO2:  [94 %-100 %] 99 %  O2 Device: None (Room air)    Physical Exam  Constitutional:       General: He is not in acute distress.  HENT:      Head: Normocephalic and atraumatic.      Mouth/Throat:      Mouth: Mucous membranes are moist.      Pharynx: Oropharynx is clear.   Eyes:      Extraocular Movements: Extraocular movements intact.      Pupils: Pupils are equal, round, and reactive to light.   Cardiovascular:      Rate and Rhythm: Normal rate and regular rhythm.      Pulses: Normal pulses.      Heart sounds: Normal heart sounds. No murmur heard.  Pulmonary:      Effort: Pulmonary effort is normal. No respiratory distress.      Breath sounds: Normal breath sounds. No wheezing, rhonchi or rales.   Abdominal:      General:  Bowel sounds are normal. There is no distension.      Palpations: Abdomen is soft.      Tenderness: There is no abdominal tenderness. There is no guarding or rebound.   Musculoskeletal:      Right lower leg: No edema.      Left lower leg: No edema.   Skin:     General: Skin is warm and dry.   Neurological:      Mental Status: He is alert and oriented to person, place, and time.      Cranial Nerves: No cranial nerve deficit.      Sensory: No sensory deficit.      Motor: Weakness present.      Comments: Right upper extremity weakness 4/5 strength which is baseline   Psychiatric:         Mood and Affect: Mood normal.         Behavior: Behavior normal.          Lines/Drains:            Lab Results: I have reviewed the following results:  Results from last 7 days   Lab Units 05/09/25  0003 05/08/25  1903   WBC Thousand/uL  --  5.34   HEMOGLOBIN g/dL  --  11.7*   HEMATOCRIT %  --  34.8*   PLATELETS Thousands/uL 225 256     Results from last 7 days   Lab Units 05/08/25  1903   SODIUM mmol/L 139   POTASSIUM mmol/L 4.8   CHLORIDE mmol/L 98   CO2 mmol/L 30   BUN mg/dL 25   CREATININE mg/dL 8.49*   ANION GAP mmol/L 11   CALCIUM mg/dL 9.1   GLUCOSE RANDOM mg/dL 164*     Results from last 7 days   Lab Units 05/08/25  1903   INR  0.98     Results from last 7 days   Lab Units 05/09/25  0356 05/08/25  1923   POC GLUCOSE mg/dl 134 182*     Lab Results   Component Value Date    HGBA1C 6.9 (H) 11/05/2024    HGBA1C 6.2 (H) 07/16/2024    HGBA1C 6.0 (H) 01/14/2024                 Administrative Statements       ** Please Note: This note has been constructed using a voice recognition system. **

## 2025-05-09 NOTE — PROCEDURES
Seen on HD:    HEMODIALYSIS PROCEDURE NOTE  The patient was seen and examined on hemodialysis.  Time: 4 hours  Sodium: 137 Blood flow: 400   Dialyzer: F160 Potassium: 2 Dialysate flow: 800   Access: AVF Bicarbonate: 35 Ultrafiltration goal: 1.5-2L   Medications on HD: none      Doing well, tolerating treatment.   BP remains elevated. Avoid low BP <150 systolic due to recent s/s.  MRI of the brain pending

## 2025-05-09 NOTE — PLAN OF CARE
Target UF Goal  to EDW as tolerated. Patient dialyzing for 4 hours on 2 K bath for serum K of  4.8  per protocol. Treatment plan reviewed with Nephrology.       Post-Dialysis RN Treatment Note    Blood Pressure:  Pre 183/110 mm/Hg  Post 166/104 mmHg   EDW  87 kg    Weight:  Pre 89.3 kg   Post 87 kg   Mode of weight measurement: Standing Scale   Volume Removed  2000 ml    Treatment duration 3 hours   NS given  No    Treatment shortened? Yes, describe: per patient request   Medications given during Rx None Reported   Estimated Kt/V  1.17   Access type: AV fistula   Access Issues: No   Needle Gauge: 15g   Report called to primary nurse   Yes, Giovany Barnett, RN & Hemant Razo RN        Problem: METABOLIC, FLUID AND ELECTROLYTES - ADULT  Goal: Electrolytes maintained within normal limits  Description: INTERVENTIONS:- Monitor labs and assess patient for signs and symptoms of electrolyte imbalances- Administer electrolyte replacement as ordered- Monitor response to electrolyte replacements, including repeat lab results as appropriate- Instruct patient on fluid and nutrition as appropriate  Outcome: Progressing  Goal: Fluid balance maintained  Description: INTERVENTIONS:- Monitor labs - Monitor I/O and WT- Instruct patient on fluid and nutrition as appropriate- Assess for signs & symptoms of volume excess or deficit  Outcome: Progressing

## 2025-05-09 NOTE — ASSESSMENT & PLAN NOTE
>>ASSESSMENT AND PLAN FOR ESRD (END STAGE RENAL DISEASE) ON DIALYSIS (HCC) WRITTEN ON 5/9/2025  5:19 AM BY ABNER MENJIVAR JR., DO    Lab Results   Component Value Date    EGFR 7 05/08/2025    EGFR 6 01/17/2025    EGFR 5 01/13/2025    CREATININE 8.49 (H) 05/08/2025    CREATININE 9.42 (H) 01/17/2025    CREATININE 10.77 (H) 01/13/2025   Creatinine stable  Receives dialysis every MWF    Plan:  Nephrology consult in place, plan to initiate dialysis 5/9

## 2025-05-09 NOTE — CASE MANAGEMENT
Case Management Assessment & Discharge Planning Note    Patient name Mateus Mckeon  Location S /S -01 MRN [id number]  : 1985 Date 2025       Current Admission Date: 2025  Current Admission Diagnosis:Stroke-like symptoms   Patient Active Problem List    Diagnosis Date Noted Date Diagnosed    Anemia in ESRD (end-stage renal disease)  ([facility]) 2025     Chronic kidney disease-mineral bone disorder (CKD-MBD) with stage 5 chronic kidney disease, on chronic dialysis ([facility]) 2025     Stroke-like symptoms 2025     Hyperkalemia 2025     Benign hypertension with ESRD (end-stage renal disease) ([facility]) 2025     Thromboembolic disorder ([facility]) 2024     Paroxysmal atrial fibrillation ([facility]) 2024     Myoclonic jerking 2024     Chronic kidney disease-mineral and bone disorder 2024     Word finding difficulty 2024     Primary hypertension 10/18/2024     Secondary hyperparathyroidism of renal origin ([facility]) 10/18/2024     Non-aneurysmal perimesencephalic subarachnoid hemorrhage ([facility]) 2024     Arm paresthesia, right 2024     Subarachnoid hemorrhage ([facility]) 2024     ESRD (end stage renal disease) on dialysis ([facility]) 2024     Type 1 diabetes ([facility]) 2023     Hyperlipidemia 2023     Insomnia 2023     RACHEAL (obstructive sleep apnea)      Status post placement of implantable loop recorder 2022     Coronary artery disease involving native coronary artery of native heart without angina pectoris 2022     Hypothyroidism 2022     Cerebellar stroke syndrome 2022     Anemia of chronic disease 2022     Generalized anxiety disorder 2021     Medical cannabis use 04/15/2021     Moderate episode of recurrent major depressive disorder ([facility]) 04/15/2021     Tubulovillous adenoma of colon 2021     Gastroesophageal reflux disease without esophagitis 2021     ESRD on hemodialysis ([facility])  12/03/2020     Secondary hyperparathyroidism (HCC) 10/23/2020     Diabetic neuropathy (HCC) 03/09/2020     Provoked seizure (HCC) 03/09/2020     Asterixis 03/09/2020     Foot drop, bilateral 03/09/2020     Impaired mobility and ADLs 02/29/2020     Vertigo 02/20/2020     Multiple pulmonary nodules 12/09/2019     Gastroparesis diabeticorum  (HCC) 09/17/2019     Pruritus 09/06/2019     Environmental and seasonal allergies 05/21/2019     Strabismus 09/24/2018     Dyslipidemia 08/24/2018     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/04/2018     Renovascular hypertension 03/26/2018     Left atrial dilation 02/27/2018     Persistent proteinuria 02/11/2018     Anemia in chronic kidney disease, on chronic dialysis ([facility]) 02/10/2018     Homozygous MTHFR mutation C677T 02/05/2018     Hyperphosphatemia 01/29/2018     Vitamin D deficiency 01/29/2018     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,   01/28/2018     Binocular visual disturbance 01/28/2018     History of CVA (cerebrovascular accident) 01/22/2018     Type 1 diabetes mellitus ([facility]) 06/19/2017     Type 1 diabetes mellitus with diabetic nephropathy, with long-term current use of insulin ([facility]) 06/19/2017     Hypertensive urgency 07/21/2015     Ataxia 07/21/2015     Cerebrovascular accident (CVA) of left pontine structure ([facility]) 07/21/2015       LOS (days): 0  Geometric Mean LOS (GMLOS) (days): 3.1  Days to GMLOS:3     OBJECTIVE:    Risk of Unplanned Readmission Score: 29.76     Current admission status: Inpatient    Preferred Pharmacy:   [facility] [place] [place] [address] [address]  Phone: [phone] Fax: [phone]    Primary Care Provider: Dania Sher DO    Primary Insurance: MEDICARE  Secondary Insurance: [employer] [facility]    ASSESSMENT:  Active Health Care Proxies       Francine Mckeon [facility] Care Representative - Mother   Primary Phone: [phone]  (Mobile)  Home Phone: [phone]                 Patient Information  Admitted from:: Home  Mental Status: Alert  During Assessment patient was accompanied by: Not accompanied during assessment  Assessment information provided by:: Patient  Primary Caregiver: Self  Support Systems: Self, Family members, Parent  County of Residence: [place]  What city do you live in?: [place]  Home entry access options. Select all that apply.: Stairs  Number of steps to enter home.: 2  Type of Current Residence: 2 story home  Upon entering residence, is there a bedroom on the main floor (no further steps)?: No  A bedroom is located on the following floor levels of residence (select all that apply):: 2nd Floor  Upon entering residence, is there a bathroom on the main floor (no further steps)?: Yes (1/2 bathroom on first and full on second)  Number of steps to 2nd floor from main floor: One Flight  Living Arrangements: Lives w/ Parent(s)    Activities of Daily Living Prior to Admission  Functional Status: Independent  Completes ADLs independently?: Yes  Ambulates independently?: Yes  Does patient use assisted devices?: Yes  Assisted Devices (DME) used: Straight Cane, Shower Chair, Other (Comment) (electric scooter)  Does patient currently own DME?: Yes  What DME does the patient currently own?: Other (Comment), Shower Chair, Straight Cane, Wheelchair (electric scooter)  Does patient have a history of Outpatient Therapy (PT/OT)?: Yes  Does the patient have a history of Short-Term Rehab?: Yes  Does patient have a history of HHC?: Yes  Does patient currently have HHC?: No    Patient Information Continued  Income Source: SSI/SSD  Does patient have prescription coverage?: Yes  Can the patient afford their medications and any related supplies (such as glucometers or test strips)?: Yes  Does patient receive dialysis treatments?: Yes ([facility] [place])  Does patient have a history of substance abuse?: No  Does patient have a  history of Mental Health Diagnosis?: Yes  Is patient receiving treatment for mental health?: Yes  Has patient received inpatient treatment related to mental health in the last 2 years?: No    Means of Transportation  Means of Transport to Appts:: Family transport    DISCHARGE DETAILS:    Discharge planning discussed with:: Pt  Freedom of Choice: Yes  Comments - Freedom of Choice: OP PT/OT    Other Referral/Resources/Interventions Provided:  Interventions: Outpatient OT, Outpatient PT  Referral Comments: CM met with pt at bedside and introduced self/role with dcp. Pt familiar with CM role and dcp. CM reviewed PT/OT recs for outpatient therapy. Pt agreeable to this. Aware CM will f/u with any other recommendations prior to dc.    Treatment Team Recommendation: Home  Discharge Destination Plan:: Home

## 2025-05-09 NOTE — ASSESSMENT & PLAN NOTE
Neurology following  History of prior CVA x 3  Presented with vertigo and vomiting along with hypertensive urgency  MRI pending

## 2025-05-09 NOTE — PHYSICAL THERAPY NOTE
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,      Binocular visual disturbance    Hyperphosphatemia    Vitamin D deficiency    Homozygous MTHFR mutation C677T    Anemia in chronic kidney disease, on chronic dialysis (HCC)    Persistent proteinuria    Ataxia    Left atrial dilation    Renovascular hypertension    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)    Dyslipidemia    Strabismus    Environmental and seasonal allergies    Pruritus    Gastroparesis diabeticorum  (HCC)    Multiple pulmonary nodules    Vertigo    Impaired mobility and ADLs    Diabetic neuropathy (HCC)    Provoked seizure (HCC)    Asterixis    Foot drop, bilateral    Secondary hyperparathyroidism (HCC)    ESRD on hemodialysis (HCC)    Tubulovillous adenoma of colon    Gastroesophageal reflux disease without esophagitis    Medical cannabis use    Generalized anxiety disorder    Hypothyroidism    Coronary artery disease involving native coronary artery of native heart without angina pectoris    Status post placement of implantable loop recorder    RACHEAL (obstructive sleep apnea)    Type 1 diabetes (HCC)    Hyperlipidemia    Insomnia    Subarachnoid hemorrhage (HCC)    Cerebellar stroke syndrome    Anemia of chronic disease    ESRD (end stage renal disease) on dialysis (HCC)    Cerebrovascular accident (CVA) of left pontine structure (HCC)    Arm paresthesia, right    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)    Moderate episode of recurrent major depressive disorder (HCC)    Primary hypertension    Secondary hyperparathyroidism of renal  origin (HCC)    Word finding difficulty    Chronic kidney disease-mineral and bone disorder    Myoclonic jerking    Thromboembolic disorder (HCC)    Paroxysmal atrial fibrillation (HCC)    Benign hypertension with ESRD (end-stage renal disease) (HCC)    Hyperkalemia    Type 1 diabetes mellitus with diabetic nephropathy, with long-term current use of insulin (HCC)    Stroke-like symptoms       Past Medical History  Past Medical History:   Diagnosis Date    Acute kidney injury (HCC)     Ambulates with cane     Anuria     Anxiety     Cellulitis of right elbow 03/31/2021    Chronic kidney disease     COVID-19 10/18/2024    Depression     Diabetes mellitus (HCC)     Diarrhea     Emesis 10/24/2020    End stage renal disease (HCC) 02/11/2018    Formatting of this note might be different from the original. Last Assessment & Plan:  Secondary to DM.  On nightly PD.  Followed by [facility]ph[facility].  [facility] Formatting of this note might be different from the original. Last Assessment & Plan:  Formatting of this note might be different from the original. 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    Esophagitis 07/21/2015    Falls     Gastroparesis     GERD (gastroesophageal reflux disease)     History of shingles 2010    History of transfusion 02/2018    no adverse reaction    Hyperlipidemia     Hyperphosphatemia     Hypertension     Hypoglycemia 07/15/2022    Itching     Mastoiditis of right side 07/15/2022    Muscle weakness     general unsteadiness    Obesity (BMI 30.0-34.9) 09/09/2019    Orthostatic hypotension 10/25/2020    Peripheral polyneuropathy 11/20/2019    PONV (postoperative nausea and vomiting) 01/26/2018    Protein-calorie malnutrition (HCC) 11/23/2020    Recurrent peritonitis (HCC) due to peritoneal dialysis catheter 07/31/2020    Retinopathy     Seizures (HCC)     early 2020 - one time    Skin abnormality     some dime size  areas where skin was scratched from itching    Sleep apnea     Spontaneous bacterial peritonitis (HCC) 10/19/2020    Squamous cell skin cancer     left temple    Stroke (HCC)     x2 - off balance/no driving/fatigue    Swelling of both lower extremities     Swelling of joint of upper arm, right 04/03/2024    Traumatic onycholysis 07/21/2022    Vomiting     Wears glasses     Word finding difficulty 11/05/2024       Past Surgical History  Past Surgical History:   Procedure Laterality Date    CARDIAC ELECTROPHYSIOLOGY PROCEDURE N/A 9/21/2023    Procedure: Cardiac loop recorder explant;  Surgeon: Parish Morgan MD;  Location: [facility];  Service: Cardiology    CARDIAC LOOP RECORDER  05/2018    COLONOSCOPY      EGD      EYE SURGERY Right     HEMODIALYSIS ADULT  11/6/2024    IR AV FISTULAGRAM/GRAFTOGRAM  02/23/2021    IR CEREBRAL ANGIOGRAPHY  1/12/2024    IR CEREBRAL ANGIOGRAPHY / INTERVENTION  1/5/2024    IR TUNNELED CENTRAL LINE PLACEMENT  02/16/2021    IR TUNNELED DIALYSIS CATHETER PLACEMENT  11/18/2020    IR TUNNELED DIALYSIS CATHETER REMOVAL  02/12/2021    IR TUNNELED DIALYSIS CATHETER REMOVAL  03/11/2021    MOHS SURGERY Left 12/14/2022    Left temple with Dr. Hassan    PERITONEAL CATHETER INSERTION N/A 08/27/2018    Procedure: UNROOF PD CATHETER;  Surgeon: Felipe Lindo DO;  Location: [facility] Main OR;  Service: General    OH ARTERIOVENOUS ANASTOMOSIS OPEN DIRECT Left 11/09/2020    Procedure: CREATION FISTULA  ARTERIOVENOUS (AV) - LEFT WRIST;  Surgeon: Placido Altamirano MD;  Location: [facility];  Service: Vascular    OH ESOPHAGOGASTRODUODENOSCOPY TRANSORAL DIAGNOSTIC N/A 04/18/2019    Procedure: ESOPHAGOGASTRODUODENOSCOPY (EGD);  Surgeon: Ale Figueroa MD;  Location: [facility] GI LAB;  Service: Gastroenterology    OH LAPS INSERTION TUNNELED INTRAPERITONEAL CATHETER N/A 08/06/2018    Procedure: LAPAROSCOPIC PD CATHETER PLACEMENT;  Surgeon: Felipe Lindo DO;  Location: [facility];  Service: General    OH REMOVAL  TUNNELED INTRAPERITONEAL CATHETER N/A 11/18/2020    Procedure: REMOVAL CATHETER PERITONEAL DIALYSIS;  Surgeon: Abdifatah Ty MD;  Location: [facility];  Service: General    TONSILLECTOMY      UPPER GASTROINTESTINAL ENDOSCOPY          05/09/25 0837   PT Last Visit   PT Visit Date 05/09/25   Note Type   Note type Evaluation   Pain Assessment   Pain Assessment Tool 0-10   Pain Score No Pain   Restrictions/Precautions   Weight Bearing Precautions Per Order No   Other Precautions Chair Alarm;Bed Alarm;Fall Risk;Multiple lines;Telemetry   Home Living   Type of Home House   Home Layout Two level;Stairs to enter with rails;1/2 bath on main level;Bed/bath upstairs  (2 NIRU)   Bathroom Shower/Tub Tub/shower unit   Bathroom Toilet Raised   Bathroom Equipment Grab bars in shower;Tub transfer bench;Grab bars around toilet   Home Equipment Walker;Cane;Crutches;Wheelchair-manual   Prior Function   Level of [place] Independent with functional mobility;Needs assistance with ADLs   Lives With Family  (parents)   Receives Help From Family   IADLs Family/Friend/Other provides transportation;Family/Friend/Other provides meals;Family/Friend/Other provides medication management   Falls in the last 6 months 0   Comments at baseline ambulates with SPC vs no AD within home, SPC vs RW pending distances in community   General   Additional Pertinent History 39-year-old male history of multiple strokes presenting due to dizziness and headache that started this morning when he woke up. ADmit as stroke alert. CTA Stroke alert: IMPRESSION:  No large vessel occlusion, high-grade stenosis, or intracranial aneurysm identified on CT angiogram of the head. Stable moderate stenosis of the left cavernous and supraclinoid internal carotid artery due to atherosclerotic plaque.     No hemodynamically significant stenosis or dissection identified on CT angiogram of the neck.     Mildly enlarged main pulmonary artery, indicative of a pulmonary hypertension.  "  Family/Caregiver Present Yes   Cognition   Arousal/Participation Cooperative   Orientation Level Oriented X4   Following Commands Follows one step commands without difficulty   Comments pt ID by wristband, name and    Subjective   Subjective pt agreeable to PT eval   Strength RLE   R Hip Flexion 4/5   R Knee Flexion 3+/5   R Knee Extension 3+/5   R Ankle Dorsiflexion 3+/5   R Ankle Plantar Flexion 3+/5   Strength LLE   L Hip Flexion 4/5   L Knee Flexion 3+/5   L Knee Extension 3+/5   L Ankle Dorsiflexion 3+/5   L Ankle Plantar Flexion 3+/5   Coordination   Movements are Fluid and Coordinated 0   Coordination and Movement Description ataxic   Sensation   (denies new numbness/tingling)   Bed Mobility   Supine to Sit 5  Supervision   Additional items HOB elevated   Transfers   Sit to Stand 5  Supervision   Additional items Increased time required   Stand to Sit 5  Supervision   Additional items Increased time required   Ambulation/Elevation   Gait pattern Ataxia;Decreased toe off;Short stride;Decreased foot clearance;Wide COLBY   Gait Assistance 5  Supervision  (close supervision)   Additional items Verbal cues   Assistive Device None   Distance 12'x1, 40'x2   Ambulation/Elevation Additional Comments without AD, ambulates with BL UE in high guard position. no LOB or path deviations noted. slowed kailee during turn negotiation. pt notes constant light headedness/dizziness with ambulation \"it's bearable\"   Balance   Static Sitting Fair +   Dynamic Sitting Fair   Static Standing Poor +   Dynamic Standing Poor +   Ambulatory Poor +   Activity Tolerance   Activity Tolerance Patient limited by fatigue  (limited by lightheadness/dizziness)   Medical Staff Made Aware care coordinated with JACLYN portillo due to pt medical complexity, comorbidites and deviation from functional baseline. jurgen Lu present. RN updated post. spoke with CM   Assessment   Prognosis Good   Problem List Decreased strength;Decreased " endurance;Impaired balance;Decreased mobility   Assessment Pt is a 39 y.o. male seen for PT evaluation s/p admit to [facility] on 5/8/2025. Pt was admitted with a primary dx of: stroke-like symptoms, dizziness, HTN, ESRD, headache.  PT now consulted for assessment of mobility and d/c needs. Pt with OOB to chair orders.  Pts current comorbidities and personal factors effecting treatment include: BMI, history of CVA, anemia in CKD on chronic dialysis, orthostatic hypotension, CAD, RACHEAL. Pts current clinical presentation is Unstable/Unpredictable (high complexity) due to Ongoing medical management for primary dx, Decreased activity tolerance compared to baseline, Fall risk, Increased assistance needed from caregiver at current time, Ongoing telemetry monitoring, Diagnostic imaging pending. Prior to admission, pt was independent with use of SC. Upon evaluation, pt currently is requiring Supervision for bed mobility; Supervision for transfers and Supervision for ambulation 40 ft w/ no AD. Pt presents at PT eval functioning below baseline and currently w/ overall mobility deficits 2* to: BLE weakness, impaired balance, gait deviations, decreased activity tolerance compared to baseline, decreased functional mobility tolerance compared to baseline, fall risk. Pt currently at a fall risk 2* to impairments listed above.  Pt will continue to benefit from skilled acute PT interventions to address stated impairments; to maximize functional mobility; for ongoing pt/ family training; and DME needs. At conclusion of PT session chair alarm engaged, all needs in reach, RN notified of session findings/recommendations, and pt returned back in recliner chair with phone and call bell within reach. Pt denies any further questions at this time. Recommend Level III (Minimum Resource Intensity)  upon hospital D/C.   Goals   Patient Goals to get better   STG Expiration Date 05/19/25   Short Term Goal #1 In 10 days pt will be able to:  1. Demonstrate ability to perform all aspects of bed mobility independently to improve functional safety.  2. Perform functional transfers with LRAD independently to facilitate safe return to previous living environment.  3.  Ambulate 150 ft with LRAD independently with stable vitals to improve safety with household distances and reduce fall risk.  4. Improve LE strength grades by 1 to increase ease of functional mobility with transfers and gait. 5. Pt will demonstrate improved balance by one grade in order to decrease risk of falls. 6. Climb at least 2 steps with unilateral HR LRAD and independently  to simulate entrance to home.   PT Treatment Day 0   Plan   Treatment/Interventions Functional transfer training;LE strengthening/ROM;Elevations;Therapeutic exercise;Patient/family training;Equipment eval/education;Bed mobility;Gait training;Endurance training;Spoke to nursing;Spoke to case management;OT   PT Frequency 2-3x/wk   Discharge Recommendation   Rehab Resource Intensity Level, PT III (Minimum Resource Intensity)   AM-PAC Basic Mobility Inpatient   Turning in Flat Bed Without Bedrails 3   Lying on Back to Sitting on Edge of Flat Bed Without Bedrails 3   Moving Bed to Chair 3   Standing Up From Chair Using Arms 3   Walk in Room 3   Climb 3-5 Stairs With Railing 3   Basic Mobility Inpatient Raw Score 18   Basic Mobility Standardized Score 41.05   [facility] Highest Level Of Mobility   -HLM Goal 6: Walk 10 steps or more   -HLM Achieved 7: Walk 25 feet or more   End of Consult   Patient Position at End of Consult Bedside chair;Bed/Chair alarm activated;All needs within reach   The patient's AM-PAC Basic Mobility Inpatient Short Form Raw Score is 18. A Raw score of greater than 16 suggests the patient may benefit from discharge to home. Please also refer to the recommendation of the Physical Therapist for safe discharge planning.         05/09/25 0842 05/09/25 0846 05/09/25 0847   Vitals   Pulse 71 70 71    Blood Pressure (!) 189/104 152/97 (!) 171/101   MAP (mmHg) 132 115 124   Patient Position - Orthostatic VS   (post ambulation to recliner) Standing Standing         Holden Ashley, PT

## 2025-05-09 NOTE — PLAN OF CARE
Problem: OCCUPATIONAL THERAPY ADULT  Goal: Performs self-care activities at highest level of function for planned discharge setting.  See evaluation for individualized goals.  Description: Treatment Interventions: ADL retraining, Functional transfer training, UE strengthening/ROM, Endurance training, Patient/family training, Equipment evaluation/education, Compensatory technique education, Continued evaluation, Energy conservation, Activityengagement          See flowsheet documentation for full assessment, interventions and recommendations.   Note: Limitation: Decreased ADL status, Decreased Safe judgement during ADL, Decreased endurance, Decreased self-care trans, Decreased high-level ADLs  Prognosis: Fair  Assessment: Pt is a 40 yo male who presented to [facility] with dizziness and headache with SBP in 200s in which pt dx w/ stroke-like symptoms, MRI pending. Pt with active OT orders in which OT consulted to assess pt's functional status and occupational performance to determine safe d/c needs. Pt seen with PT to increase safety, decrease fall risk, and maximize functional/occupational performance 2* medical complexity which is a regression from pt's functional baseline. Pt lives with his parents in a 2  with 2 NIRU. PTA, pt was independent in ADLs, has assistance with IADLs, and uses SPC vs RW vs no DME for functional mobility. (-) driving. Currently, pt performing bed mobility @ supervision level, functional transfers/mobility @ supervision level w/ no DME, and UB/LB ADLs @ supervision level. Pt demonstrates the following limitations/impairments which impact the pt's ability to engage in valued occupations: dizziness, balance, endurance/activity tolerance, standing tolerance, functional reach, postural/trunk control, strength, safety awareness, and pain. From an OT standpoint, recommend discharge w/ Level 3 resources once medically stable. The patient's raw score on the AM-PAC Daily Activity  Inpatient Short Form is 21. A raw score of greater than or equal to 19 suggests the patient may benefit from discharge to home. Please refer to the recommendation of the Occupational Therapist for safe discharge planning.  Pt would benefit from skilled OT services 1-2x/wk to address acute care needs and underlying performance skills to promote safety, decrease fall risk, and enhance occupational performance to return to PLOF. Goals to be met within the next 10-14 days.     Rehab Resource Intensity Level, OT: III (Minimum Resource Intensity)

## 2025-05-09 NOTE — ASSESSMENT & PLAN NOTE
Lab Results   Component Value Date    EGFR 7 05/08/2025    EGFR 6 01/17/2025    EGFR 5 01/13/2025    CREATININE 8.49 (H) 05/08/2025    CREATININE 9.42 (H) 01/17/2025    CREATININE 10.77 (H) 01/13/2025   Creatinine stable  Receives dialysis every MWF    Plan:  Nephrology consult in place, plan to initiate dialysis 5/9

## 2025-05-09 NOTE — CONSULTS
Consultation - Neurology   Name: Mateus Mckeon 39 y.o. male I MRN: [id number]  Unit/Bed#: S -01 I Date of Admission: 5/8/2025   Date of Service: 5/9/2025 I Hospital Day: 0   Consult to Neurology  Consult performed by: Cody Martini MD  Consult ordered by: Holden Whiteside MD        Physician Requesting Evaluation: Gloria Greer*   Reason for Evaluation / Principal Problem: Vertigo      Assessment/Plan:    Vertigo: Occurring shortly after double generalized partial on the day of presentation.  Patient states he woke up as his usual self without dizziness and was at baseline prior to this.  He describes it as room spinning and worsening of his past balance issues from previous strokes.  No associated new weakness or sensory issues.  There is a concurrent occipital headache that has been going for 2 weeks prior to presentation.  CT head showing old lacunar infarcts bilaterally basal ganglia, corona radiata on the left but no acute stroke.  CTA head and neck showing stable moderate stenosis of the left cavernous and supraclinoid internal carotid artery due to atherosclerotic plaque.  Suspecting that this vertigo was in the setting of hypertensive urgency occurring after taking double shot of espresso versus less likely possible recurrent stroke.  Brain MRI is ordered  Speech consulted  PT OT evaluations  Neurochecks per stroke pathway  Echo done in November EF65%, G1DD, bicuspid AV, no PFO, no need for new Echo  Occipital headache: For the past 2 weeks, possibly longer, 6 out of 10 in severity, radiating towards the front of the head bilaterally, described as a dull ache, with photophobia and phonophobia.  Currently the patient states he is not experiencing the headache, but does state that the dizziness is getting better.  Suspect that this is likely associated with hypertensive urgency as the patient came with blood pressures in the 230s systolic which was an acute rise from his  baseline.  Symptoms appear to be getting better with blood pressure control.  Brain MRI is ordered as above  Hypertensive urgency: Patient presenting with blood pressures in the 230s systolic, which is an acute rise from his baseline of 120s to 140s systolic at home.  This is occurring after he takes a double shot of espresso.  He states that he is compliant with his blood pressure medicines at home.  Blood pressure has improved to 170s systolic, and his symptoms stated above are improving as well.  Patient is due for dialysis today, will likely improve his blood pressure control  Continue blood pressure management with home medications  Goal is normotension as this was an acute rise in blood pressure on the day of presentation    Recommendations for outpatient neurological follow up have yet to be determined.    History of Present Illness   Mateus Mckeon is a 39 y.o.  male with past medical history of 3 CVAs with baseline right upper extremity weakness and ambulatory dysfunction due to balance issues who presents with acute onset dizziness occurring on the morning of 5/8.  Patient states that he was at his baseline when he woke up but that he took a double shot of espresso and then shortly afterwards he started having acute dizziness which he describes as feeling like the room is spinning and this is a finding that is new for him.  The dizziness was worse with movements.  There were no relieving factors for the dizziness.  He also endorses having several weeks of a headache in the occipital area that radiates towards the front of his head bilaterally.  The headache is associated with phonophobia or photophobia.  He describes it as a 6 out of 10 ongoing for few weeks.  Blood pressure at home is between 120s to 140s systolic, but on the day of presentation he noticed that his blood pressure was in the 180s systolic.  When he presented to the ED he had blood pressure in the 230s systolic.     Patient denies any  new weakness or numbness or tingling other than his old stroke deficits.    Review of Systems   Constitutional:  Negative for chills and fever.   HENT:  Negative for ear pain and sore throat.    Eyes:  Negative for pain and visual disturbance.   Respiratory:  Negative for cough and shortness of breath.    Cardiovascular:  Negative for chest pain and palpitations.   Gastrointestinal:  Negative for abdominal pain and vomiting.   Genitourinary:  Negative for dysuria and hematuria.   Musculoskeletal:  Negative for arthralgias and back pain.   Skin:  Negative for color change and rash.   Neurological:  Positive for dizziness, weakness and headaches. Negative for seizures and syncope.   All other systems reviewed and are negative.     dizziness  Historical Information   Past Medical History:   Diagnosis Date    Acute kidney injury (HCC)     Ambulates with cane     Anuria     Anxiety     Cellulitis of right elbow 03/31/2021    Chronic kidney disease     COVID-19 10/18/2024    Depression     Diabetes mellitus (HCC)     Diarrhea     Emesis 10/24/2020    End stage renal disease (HCC) 02/11/2018    Formatting of this note might be different from the original. Last Assessment & Plan:  Secondary to DM.  On nightly PD.  Followed by Nephro.  [facility] Formatting of this note might be different from the original. Last Assessment & Plan:  Formatting of this note might be different from the original. 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    Esophagitis 07/21/2015    Falls     Gastroparesis     GERD (gastroesophageal reflux disease)     History of shingles 2010    History of transfusion 02/2018    no adverse reaction    Hyperlipidemia     Hyperphosphatemia     Hypertension     Hypoglycemia 07/15/2022    Itching     Mastoiditis of right side 07/15/2022    Muscle weakness     general unsteadiness    Obesity (BMI 30.0-34.9) 09/09/2019     Orthostatic hypotension 10/25/2020    Peripheral polyneuropathy 11/20/2019    PONV (postoperative nausea and vomiting) 01/26/2018    Protein-calorie malnutrition (HCC) 11/23/2020    Recurrent peritonitis (HCC) due to peritoneal dialysis catheter 07/31/2020    Retinopathy     Seizures (HCC)     early 2020 - one time    Skin abnormality     some dime size areas where skin was scratched from itching    Sleep apnea     Spontaneous bacterial peritonitis (HCC) 10/19/2020    Squamous cell skin cancer     left temple    Stroke (HCC)     x2 - off balance/no driving/fatigue    Swelling of both lower extremities     Swelling of joint of upper arm, right 04/03/2024    Traumatic onycholysis 07/21/2022    Vomiting     Wears glasses     Word finding difficulty 11/05/2024     Past Surgical History:   Procedure Laterality Date    CARDIAC ELECTROPHYSIOLOGY PROCEDURE N/A 9/21/2023    Procedure: Cardiac loop recorder explant;  Surgeon: Parish Morgan MD;  Location: [facility];  Service: Cardiology    CARDIAC LOOP RECORDER  05/2018    COLONOSCOPY      EGD      EYE SURGERY Right     HEMODIALYSIS ADULT  11/6/2024    IR AV FISTULAGRAM/GRAFTOGRAM  02/23/2021    IR CEREBRAL ANGIOGRAPHY  1/12/2024    IR CEREBRAL ANGIOGRAPHY / INTERVENTION  1/5/2024    IR TUNNELED CENTRAL LINE PLACEMENT  02/16/2021    IR TUNNELED DIALYSIS CATHETER PLACEMENT  11/18/2020    IR TUNNELED DIALYSIS CATHETER REMOVAL  02/12/2021    IR TUNNELED DIALYSIS CATHETER REMOVAL  03/11/2021    MOHS SURGERY Left 12/14/2022    Left temple with Dr. Hassan    PERITONEAL CATHETER INSERTION N/A 08/27/2018    Procedure: UNROOF PD CATHETER;  Surgeon: Fleipe Lindo DO;  Location: [facility];  Service: General    AK ARTERIOVENOUS ANASTOMOSIS OPEN DIRECT Left 11/09/2020    Procedure: CREATION FISTULA  ARTERIOVENOUS (AV) - LEFT WRIST;  Surgeon: Placido Altamirano MD;  Location: [facility];  Service: Vascular    AK ESOPHAGOGASTRODUODENOSCOPY TRANSORAL DIAGNOSTIC N/A 04/18/2019     Procedure: ESOPHAGOGASTRODUODENOSCOPY (EGD);  Surgeon: Ale Figueroa MD;  Location: [facility];  Service: Gastroenterology    WV LAPS INSERTION TUNNELED INTRAPERITONEAL CATHETER N/A 2018    Procedure: LAPAROSCOPIC PD CATHETER PLACEMENT;  Surgeon: Felipe Lindo DO;  Location: [facility];  Service: General    WV REMOVAL TUNNELED INTRAPERITONEAL CATHETER N/A 2020    Procedure: REMOVAL CATHETER PERITONEAL DIALYSIS;  Surgeon: Abdifatah Ty MD;  Location: [facility];  Service: General    TONSILLECTOMY      UPPER GASTROINTESTINAL ENDOSCOPY       Social History     Tobacco Use    Smoking status: Former     Current packs/day: 0.00     Average packs/day: 0.5 packs/day for 12.0 years (6.0 ttl pk-yrs)     Types: Cigarettes     Start date: 2006     Quit date: 2018     Years since quittin.2    Smokeless tobacco: Never    Tobacco comments:     quit 2018   Vaping Use    Vaping status: Every Day    Substances: THC, CBD   Substance and Sexual Activity    Alcohol use: Not Currently    Drug use: Yes     Types: Marijuana     Comment: medical marijuana last vaped 2024    Sexual activity: Not Currently     E-Cigarette/Vaping    E-Cigarette Use Current Every Day User     Comments medical marijuana      E-Cigarette/Vaping Substances    Nicotine No     THC Yes     CBD Yes     Flavoring No     Other No     Unknown No      Family History   Problem Relation Age of Onset    Breast cancer Mother     Hypertension Mother     Hyperlipidemia Father     Hypertension Father     Leukemia Maternal Grandmother     Hyperlipidemia Maternal Grandfather     Hypertension Maternal Grandfather     Hyperlipidemia Paternal Grandmother     Hypertension Paternal Grandmother     Heart disease Paternal Grandfather         cardiac disorder    Diabetes Paternal Grandfather      Social History     Tobacco Use    Smoking status: Former     Current packs/day: 0.00     Average packs/day: 0.5 packs/day for 12.0 years (6.0 ttl pk-yrs)      Types: Cigarettes     Start date: 2006     Quit date: 2018     Years since quittin.2    Smokeless tobacco: Never    Tobacco comments:     quit 2018   Vaping Use    Vaping status: Every Day    Substances: THC, CBD   Substance and Sexual Activity    Alcohol use: Not Currently    Drug use: Yes     Types: Marijuana     Comment: medical marijuana last vaped 2024    Sexual activity: Not Currently       Current Facility-Administered Medications:     aspirin (ECOTRIN LOW STRENGTH) EC tablet 81 mg, Daily    atorvastatin (LIPITOR) tablet 40 mg, QPM    calcitriol (ROCALTROL) capsule 0.75 mcg, Once per day on     calcium acetate (PHOSLO) capsule 667 mg, TID    calcium carbonate (TUMS) chewable tablet 750 mg, Daily PRN    cinacalcet (SENSIPAR) tablet 60 mg, Daily    cloNIDine (CATAPRES) tablet 0.2 mg, BID    escitalopram (LEXAPRO) tablet 20 mg, Daily    gabapentin (NEURONTIN) capsule 100 mg, Daily    gabapentin (NEURONTIN) capsule 200 mg, HS    heparin (porcine) subcutaneous injection 5,000 Units, Q8H HALIE **AND** [COMPLETED] Platelet count, Once    hydrALAZINE (APRESOLINE) tablet 100 mg, Q8H HALIE    hydrOXYzine HCL (ATARAX) tablet 10 mg, Q6H PRN    insulin lispro (humALOG/ADMELOG) FOR PUMP REFILLS 50 Units, Daily PRN    labetalol (NORMODYNE) tablet 200 mg, Daily    labetalol (NORMODYNE) tablet 400 mg, Daily    labetalol (NORMODYNE) tablet 400 mg, HS    losartan (COZAAR) tablet 100 mg, Daily    NIFEdipine (PROCARDIA XL) 24 hr tablet 90 mg, BID    ondansetron (ZOFRAN) injection 4 mg, Q6H PRN    PATIENT MAINTAINED INSULIN PUMP 1 each, Q8H    torsemide (DEMADEX) tablet 100 mg, Daily    traZODone (DESYREL) tablet 50 mg, HS PRN  Prior to Admission Medications   Prescriptions Last Dose Informant Patient Reported? Taking?   GLUCAGON EMERGENCY 1 MG injection  Self, Family Member Yes No   Gvoke HypoPen 1-Pack 1 MG/0.2ML SOAJ  Self, Family Member Yes No   Sig: as needed   Insulin Disposable Pump  (Omnipod 5 G6 Intro, Gen 5,) KIT  Self, Family Member Yes No   Insulin Disposable Pump (Omnipod 5 G6 Pod, Gen 5,) MISC  Self, Family Member Yes No   NIFEdipine (PROCARDIA XL) 90 mg 24 hr tablet  Self, Family Member No No   Sig: Take 1 tablet (90 mg total) by mouth 2 (two) times a day   NovoLOG 100 UNIT/ML injection  Self, Family Member Yes No   aspirin (ECOTRIN LOW STRENGTH) 81 mg EC tablet  Self, Family Member Yes No   Sig: Take 81 mg by mouth daily   atorvastatin (LIPITOR) 40 mg tablet  Self, Family Member No No   Sig: TAKE 1 TABLET BY MOUTH ONCE DAILY IN THE EVENING   b complex vitamins capsule  Self, Family Member Yes No   Sig: Take 1 capsule by mouth daily before lunch    calcium acetate (PHOSLO) capsule  Self, Family Member No No   Sig: Take 1 capsule (667 mg total) by mouth 3 (three) times a day   cinacalcet (SENSIPAR) 60 MG tablet  Self, Family Member No No   Sig: Take 1 tablet (60 mg total) by mouth daily   cloNIDine (CATAPRES) 0.2 mg tablet  Self, Family Member No No   Sig: Take 1 tablet (0.2 mg total) by mouth every 8 (eight) hours   escitalopram (LEXAPRO) 20 mg tablet   No No   Sig: Take 1 tablet (20 mg total) by mouth daily   gabapentin (NEURONTIN) 100 mg capsule   No No   Sig: TAKE 1 CAPSULE BY MOUTH IN THE MORNING AND 2 CAPSULES AT BEDTIME   hydrALAZINE (APRESOLINE) 100 MG tablet  Self, Family Member No No   Sig: Take 1 tablet (100 mg total) by mouth every 8 (eight) hours   hydrOXYzine HCL (ATARAX) 10 mg tablet  Self, Family Member No No   Sig: Take 1 tablet (10 mg total) by mouth every 6 (six) hours as needed for itching or anxiety   labetalol (NORMODYNE) 200 mg tablet  Self, Family Member No No   Sig: Take 2 tablets (400 mg total) by mouth 3 (three) times a day   Patient taking differently: Take 200 mg by mouth Take 200 mg at 2 pm, 400 mg at 5 pm and 400 mg at 10 pm   olmesartan (BENICAR) 40 mg tablet  Self, Family Member Yes No   Sig: Take 40 mg by mouth daily   ondansetron (ZOFRAN) 4 mg tablet   Self, Family Member No No   Sig: Take 1 tablet (4 mg total) by mouth every 8 (eight) hours as needed for nausea or vomiting   torsemide (DEMADEX) 100 mg tablet  Self, Family Member No No   Sig: Take 1 tablet (100 mg total) by mouth daily Do not start before January 8, 2025.   traZODone (DESYREL) 50 mg tablet   No No   Sig: Take 1 tablet (50 mg total) by mouth daily at bedtime as needed for sleep      Facility-Administered Medications: None     Sulfa antibiotics    Objective :  Temp:  [97.6 °F (36.4 °C)-98.1 °F (36.7 °C)] 97.9 °F (36.6 °C)  HR:  [58-73] 62  BP: (152-247)/() 186/88  Resp:  [16-18] 16  SpO2:  [92 %-100 %] 95 %  O2 Device: None (Room air)    Physical Exam  Vitals and nursing note reviewed.   Constitutional:       General: He is not in acute distress.     Appearance: He is well-developed.   HENT:      Head: Normocephalic and atraumatic.   Eyes:      Extraocular Movements: Extraocular movements intact.      Conjunctiva/sclera: Conjunctivae normal.      Pupils: Pupils are equal, round, and reactive to light.   Cardiovascular:      Rate and Rhythm: Normal rate and regular rhythm.      Heart sounds: No murmur heard.  Pulmonary:      Effort: Pulmonary effort is normal. No respiratory distress.      Breath sounds: Normal breath sounds.   Abdominal:      Palpations: Abdomen is soft.      Tenderness: There is no abdominal tenderness.   Musculoskeletal:         General: No swelling.      Cervical back: Neck supple.   Skin:     General: Skin is warm and dry.      Capillary Refill: Capillary refill takes less than 2 seconds.   Neurological:      Mental Status: He is oriented to person, place, and time.      Sensory: No sensory deficit.      Motor: Weakness (residual R hand weakness 4/5) present.      Coordination: Coordination abnormal.      Gait: Gait abnormal (abnormal tandem gait).   Psychiatric:         Mood and Affect: Mood normal.     Neurological Exam  Mental Status  Awake, alert and oriented to  person, place and time. Oriented to person, place, time and situation. Oriented to person, place, and time.    Cranial Nerves  CN II: Visual fields full to confrontation.  CN III, IV, VI: Extraocular movements intact bilaterally. Pupils equal round and reactive to light bilaterally.  CN V: Facial sensation is normal.  CN VII: Full and symmetric facial movement.  CN VIII: Hearing is normal.  CN IX, X: Normal gag reflex.  CN XI: Shoulder shrug strength is normal.  CN XII: Tongue midline without atrophy or fasciculations.    Motor   No pronator drift.    Gait   Abnormal gait (abnormal tandem gait).        Lab Results: I have reviewed the following results:CBC:   Results from last 7 days   Lab Units 05/09/25  0509 05/09/25 0003 05/08/25  1903   WBC Thousand/uL 5.08  --  5.34   RBC Million/uL 3.61*  --  3.51*   HEMOGLOBIN g/dL 11.9*  --  11.7*   HEMATOCRIT % 36.5  --  34.8*   MCV fL 101*  --  99*   PLATELETS Thousands/uL 231 225 256     Recent Labs     05/08/25  1903 05/09/25  0003 05/09/25  0509   WBC 5.34  --  5.08   HGB 11.7*  --  11.9*   HCT 34.8*  --  36.5      < > 231   SODIUM 139  --   --    K 4.8  --   --    CL 98  --   --    CO2 30  --   --    BUN 25  --   --    CREATININE 8.49*  --   --    GLUC 164*  --   --     < > = values in this interval not displayed.     Imaging Results Review: No pertinent imaging studies reviewed.  Other Study Results Review: No additional pertinent studies reviewed.    VTE Prophylaxis: VTE covered by:  heparin (porcine), Subcutaneous, 5,000 Units at 05/09/25 0514

## 2025-05-09 NOTE — PLAN OF CARE
Problem: PHYSICAL THERAPY ADULT  Goal: Performs mobility at highest level of function for planned discharge setting.  See evaluation for individualized goals.  Description: Treatment/Interventions: Functional transfer training, LE strengthening/ROM, Elevations, Therapeutic exercise, Patient/family training, Equipment eval/education, Bed mobility, Gait training, Endurance training, Spoke to nursing, Spoke to case management, OT          See flowsheet documentation for full assessment, interventions and recommendations.  Note: Prognosis: Good  Problem List: Decreased strength, Decreased endurance, Impaired balance, Decreased mobility  Assessment: Pt is a 39 y.o. male seen for PT evaluation s/p admit to [facility] on 5/8/2025. Pt was admitted with a primary dx of: stroke-like symptoms, dizziness, HTN, ESRD, headache.  PT now consulted for assessment of mobility and d/c needs. Pt with OOB to chair orders.  Pts current comorbidities and personal factors effecting treatment include: BMI, history of CVA, anemia in CKD on chronic dialysis, orthostatic hypotension, CAD, RACHEAL. Pts current clinical presentation is Unstable/Unpredictable (high complexity) due to Ongoing medical management for primary dx, Decreased activity tolerance compared to baseline, Fall risk, Increased assistance needed from caregiver at current time, Ongoing telemetry monitoring, Diagnostic imaging pending. Prior to admission, pt was independent with use of SC. Upon evaluation, pt currently is requiring Supervision for bed mobility; Supervision for transfers and Supervision for ambulation 40 ft w/ no AD. Pt presents at PT eval functioning below baseline and currently w/ overall mobility deficits 2* to: BLE weakness, impaired balance, gait deviations, decreased activity tolerance compared to baseline, decreased functional mobility tolerance compared to baseline, fall risk. Pt currently at a fall risk 2* to impairments listed above.  Pt will  continue to benefit from skilled acute PT interventions to address stated impairments; to maximize functional mobility; for ongoing pt/ family training; and DME needs. At conclusion of PT session chair alarm engaged, all needs in reach, RN notified of session findings/recommendations, and pt returned back in recliner chair with phone and call bell within reach. Pt denies any further questions at this time. Recommend Level III (Minimum Resource Intensity)  upon hospital D/C.        Rehab Resource Intensity Level, PT: III (Minimum Resource Intensity)    See flowsheet documentation for full assessment.

## 2025-05-09 NOTE — SPEECH THERAPY NOTE
Speech Language/Pathology  Order received, chart reviewed. Pt passed nursing dysphagia screen and has been tolerating regular texture diet, thin liquids, and PO meds. Spoke w/ RN, RN denies concerns for dysphagia, aspiration, and/or speech/language deficits. Spoke w/ pt. Pt denies dysphagia, odynophagia, globus sensation, s/s aspiration, and/or speech/language deficits at this time. No gross speech/language deficits observed during conversation. SLP to monitor MRI results, if MRI neg (-) SLP to s/o as no skilled intervention is required, if MRI pos (+) SLP to f/u for completion of evaluation as able and appropriate.

## 2025-05-09 NOTE — OCCUPATIONAL THERAPY NOTE
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 Last Assessment & Plan:  Secondary to DM.  On nightly PD.  Followed by Nephro.  Patient considering transplant for kidney and pancreas through [facility] Formatting of this note might be different from the original. Last Assessment & Plan:  Formatting of this note might be different from the original. 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    Esophagitis 07/21/2015    Falls     Gastroparesis     GERD (gastroesophageal reflux disease)     History of shingles 2010    History of transfusion 02/2018    no adverse reaction    Hyperlipidemia     Hyperphosphatemia     Hypertension     Hypoglycemia 07/15/2022    Itching     Mastoiditis of right side 07/15/2022    Muscle weakness     general unsteadiness    Obesity (BMI 30.0-34.9) 09/09/2019    Orthostatic hypotension 10/25/2020    Peripheral polyneuropathy 11/20/2019    PONV (postoperative nausea and vomiting) 01/26/2018    Protein-calorie malnutrition (HCC) 11/23/2020    Recurrent peritonitis (HCC) due to peritoneal dialysis catheter  07/31/2020    Retinopathy     Seizures (HCC)     early 2020 - one time    Skin abnormality     some dime size areas where skin was scratched from itching    Sleep apnea     Spontaneous bacterial peritonitis (HCC) 10/19/2020    Squamous cell skin cancer     left temple    Stroke (HCC)     x2 - off balance/no driving/fatigue    Swelling of both lower extremities     Swelling of joint of upper arm, right 04/03/2024    Traumatic onycholysis 07/21/2022    Vomiting     Wears glasses     Word finding difficulty 11/05/2024     Past Surgical History  Past Surgical History:   Procedure Laterality Date    CARDIAC ELECTROPHYSIOLOGY PROCEDURE N/A 9/21/2023    Procedure: Cardiac loop recorder explant;  Surgeon: Parish Morgan MD;  [facility]: [facility];  Service: Cardiology    CARDIAC LOOP RECORDER  05/2018    COLONOSCOPY      EGD      EYE SURGERY Right     HEMODIALYSIS ADULT  11/6/2024    IR AV FISTULAGRAM/GRAFTOGRAM  02/23/2021    IR CEREBRAL ANGIOGRAPHY  1/12/2024    IR CEREBRAL ANGIOGRAPHY / INTERVENTION  1/5/2024    IR TUNNELED CENTRAL LINE PLACEMENT  02/16/2021    IR TUNNELED DIALYSIS CATHETER PLACEMENT  11/18/2020    IR TUNNELED DIALYSIS CATHETER REMOVAL  02/12/2021    IR TUNNELED DIALYSIS CATHETER REMOVAL  03/11/2021    MOHS SURGERY Left 12/14/2022    Left temple with Dr. Hassan    PERITONEAL CATHETER INSERTION N/A 08/27/2018    Procedure: UNROOF PD CATHETER;  Surgeon: Felipe Lindo DO;  Location: [facility];  Service: General    NH ARTERIOVENOUS ANASTOMOSIS OPEN DIRECT Left 11/09/2020    Procedure: CREATION FISTULA  ARTERIOVENOUS (AV) - LEFT WRIST;  Surgeon: Placido Altamirano MD;  Location: [facility];  Service: Vascular    NH ESOPHAGOGASTRODUODENOSCOPY TRANSORAL DIAGNOSTIC N/A 04/18/2019    Procedure: ESOPHAGOGASTRODUODENOSCOPY (EGD);  Surgeon: Ale Figueroa MD;  Location: [facility];  Service: Gastroenterology    NH LAPS INSERTION TUNNELED INTRAPERITONEAL CATHETER N/A 08/06/2018    Procedure: LAPAROSCOPIC PD  CATHETER PLACEMENT;  Surgeon: Felipe Lindo DO;  [facility];  Service: General    ME REMOVAL TUNNELED INTRAPERITONEAL CATHETER N/A 11/18/2020    Procedure: REMOVAL CATHETER PERITONEAL DIALYSIS;  Surgeon: Abdifatah Ty MD;  [facility];  Service: General    TONSILLECTOMY      UPPER GASTROINTESTINAL ENDOSCOPY           05/09/25 0838   OT Last Visit   OT Visit Date 05/09/25   Note Type   Note type Evaluation   Additional Comments Pt seen w/ PT to increase safety, decrease fall risk, and maximize functional/occupational performance 2* medical complexity which is a regression from pt's functional baseline.   Pain Assessment   Pain Assessment Tool 0-10   Pain Score No Pain   Hospital Pain Intervention(s) Repositioned;Ambulation/increased activity;Emotional support   Restrictions/Precautions   Weight Bearing Precautions Per Order No   Other Precautions Chair Alarm;Bed Alarm;Multiple lines;Telemetry;Fall Risk   Home Living   Type of Home House  (2  with 2 NIRU)   Home Layout Two level;1/2 bath on main level;Bed/bath upstairs;Performs ADLs on one level;Access   Bathroom Shower/Tub Tub/shower unit  (Both walk in and tub shower)   Bathroom Toilet Standard   Bathroom Equipment Grab bars in shower;Tub transfer bench;Grab bars around toilet   Bathroom Accessibility Accessible   Home Equipment Walker;Cane;Crutches;Wheelchair-manual   Additional Comments Pt reports living with his parents in a 2  with 2 NIRU, reporting that he uses SPC vs no DME within the home for functional mobility vs SPC vs RW for longer/community distances   Prior Function   Level of [place] Independent with ADLs;Independent with functional mobility;Needs assistance with IADLS   Lives With Family  (Parents)   Receives Help From Family;Outpatient therapy  (History of OP OT for functional cognition, functional use of UE, coordination, fine motor, dexterity, reporting that he graduated from OT and now is going to outpatient speech  "therapy)   IADLs Family/Friend/Other provides transportation;Family/Friend/Other provides meals;Family/Friend/Other provides medication management   Falls in the last 6 months 0   Vocational On disability   Comments (-) driving; reports that his mom manages medications 2 *complexity of medications   Lifestyle   Autonomy PTA, pt was independent in ADLs, has assistance with IADLs, and uses SPC vs no DME within the home vs SPC vs RW for longer/community functional mobility distances; (-) driving   Reciprocal Relationships Lives with his parents   Service to Others On disability   Intrinsic Gratification Enjoys spending time with his new dog   General   Additional Pertinent History When moving from EOB <> recliner, in seated position, pt's BP reading 189/104; in standing in front of recliner, pt's BP reading 152/97; after approx 1 minute of standing, pt's BP reading 171/101   Family/Caregiver Present Yes   Additional General Comments Pt's mother present, very supportive and interactive of pt   Subjective   Subjective \"I would love to get up and move.\"   ADL   Where Assessed Chair   Eating Assistance 5  Supervision/Setup   Grooming Assistance 5  Supervision/Setup   UB Bathing Assistance 5  Supervision/Setup   LB Bathing Assistance 5  Supervision/Setup   UB Dressing Assistance 5  Supervision/Setup   LB Dressing Assistance 5  Supervision/Setup   Toileting Assistance  5  Supervision/Setup   Functional Assistance 5  Supervision/Setup   Bed Mobility   Supine to Sit 5  Supervision   Additional items HOB elevated   Sit to Supine Unable to assess   Additional Comments At end of session, pt left sitting upright in recliner with all functional needs in reach with chair alarm activated, family present in room, and MD present   Transfers   Sit to Stand 5  Supervision   Additional items Increased time required   Stand to Sit 5  Supervision   Additional items Increased time required   Additional Comments w/ no DME   Functional " Mobility   Functional Mobility 5  Supervision   Additional Comments Pt completed household functional mobility distances @ supervision level w/ no DME   Balance   Static Sitting Fair +   Dynamic Sitting Fair   Static Standing Fair -   Dynamic Standing Poor +   Ambulatory Poor +   Activity Tolerance   Activity Tolerance Patient tolerated treatment well;Patient limited by fatigue;Treatment limited secondary to medical complications (Comment)  (Hypertension, lightheadedness, dizziness)   Medical Staff Made Aware GERARD Roe; Nephro AP   Nurse Made Aware RN cleared and made aware   RUE Assessment   RUE Assessment WFL   LUE Assessment   LUE Assessment WFL   Hand Function   Gross Motor Coordination Functional   Fine Motor Coordination Functional   Vision-Basic Assessment   Visual History Cataracts   Vision - Complex Assessment   Additional Comments Pt reporting no new changes in vision @ this time   Cognition   Arousal/Participation Alert;Responsive;Arousable;Cooperative   Attention Within functional limits   Orientation Level Oriented X4   Memory Within functional limits   Following Commands Follows one step commands without difficulty   Comments Pt pleasant and cooperative throughout session   Assessment   Limitation Decreased ADL status;Decreased Safe judgement during ADL;Decreased endurance;Decreased self-care trans;Decreased high-level ADLs   Prognosis Fair   Assessment Pt is a 38 yo male who [facility] with dizziness and headache with SBP in 200s in which pt dx w/ stroke-like symptoms, MRI pending. Pt with active OT orders in which OT consulted to assess pt's functional status and occupational performance to determine safe d/c needs. Pt seen with PT to increase safety, decrease fall risk, and maximize functional/occupational performance 2* medical complexity which is a regression from pt's functional baseline. Pt lives with his parents in a 2  with 2 NIRU. PTA, pt was independent in ADLs, has  assistance with IADLs, and uses SPC vs RW vs no DME for functional mobility. (-) driving. Currently, pt performing bed mobility @ supervision level, functional transfers/mobility @ supervision level w/ no DME, and UB/LB ADLs @ supervision level. Pt demonstrates the following limitations/impairments which impact the pt's ability to engage in valued occupations: dizziness, balance, endurance/activity tolerance, standing tolerance, functional reach, postural/trunk control, strength, safety awareness, and pain. From an OT standpoint, recommend discharge w/ Level 3 resources once medically stable. The patient's raw score on the AM-PAC Daily Activity Inpatient Short Form is 21. A raw score of greater than or equal to 19 suggests the patient may benefit from discharge to home. Please refer to the recommendation of the Occupational Therapist for safe discharge planning.  Pt would benefit from skilled OT services 1-2x/wk to address acute care needs and underlying performance skills to promote safety, decrease fall risk, and enhance occupational performance to return to [facility]. Goals to be met within the next 10-14 days.   Goals   Patient Goals To get better and go home   LTG Time Frame 10-14   Long Term Goal #1 See OT goals listed below   Plan   Treatment Interventions ADL retraining;Functional transfer training;UE strengthening/ROM;Endurance training;Patient/family training;Equipment evaluation/education;Compensatory technique education;Continued evaluation;Energy conservation;Activityengagement   Goal Expiration Date 05/23/25   OT Frequency 1-2x/wk   Discharge Recommendation   Rehab Resource Intensity Level, OT III (Minimum Resource Intensity)   -PAC Daily Activity Inpatient   Lower Body Dressing 3   Bathing 3   Toileting 3   Upper Body Dressing 4   Grooming 4   Eating 4   Daily Activity Raw Score 21   Daily Activity Standardized Score (Calc for Raw Score >=11) 44.27   AM-PAC Applied Cognition Inpatient   Following a  Speech/Presentation 3   Understanding Ordinary Conversation 4   Taking Medications 3   Remembering Where Things Are Placed or Put Away 4   Remembering List of 4-5 Errands 3   Taking Care of Complicated Tasks 3   Applied Cognition Raw Score 20   Applied Cognition Standardized Score 41.76   End of Consult   Education Provided Yes;Family or social support of family present for education by provider   Patient Position at End of Consult Bedside chair;Bed/Chair alarm activated;All needs within reach   Nurse Communication Nurse aware of consult     OT GOALS:    Pt will improve functional mobility during ADL/IADL/leisure tasks with Mod I using AE/DME prn.    Pt will improve activity tolerance/functional endurance during ADL/IADL/leisure tasks for at least 20 minutes to improve occupational performance and engagement in valued occupations using AE/DME prn.    Pt will be attentive 100% of the time during ongoing visual screens to rule out any further visual impairments or changes.    Pt will engage in ongoing functional/formal cognitive assessments to assist with safe d/c planning and increase safety during functional tasks.    Pt will improve dynamic standing balance for at least 15 minutes with Mod I during functional tasks to decrease fall risk and improve independence and engagement in ADL/IADL/leisure activities.    Pt will follow 100% of multi-step commands in ADL/IADL/leisure activities to improve functional cognition used in functional daily routines.    Pt will complete functional transfers on and off all surfaces used in daily routines with Mod I for safety to maximize functional/occupational performance.    Pt will complete all bed mobility tasks with Mod I to serve as a prerequisite for EOB/OOB ADL/IADL/leisure tasks, optimize positioning/comfort, and increase functional independence.    Pt will independently demonstrate good carryover of safety precautions and education/training during ADL/IADL/leisure tasks with  energy conservation techniques s/p skilled instruction without verbal cues.    Pt will complete UB ADL tasks with Mod I using AE/DME prn to increase functional independence in ADL/IADL/leisure tasks.    Pt will complete LB ADL tasks with Mod I using AE/DME prn to increase functional independence in ADL/IADL/leisure tasks.     Pt will complete toileting tasks with Mod I and good hygiene/thoroughness using AE/DME prn to increase functional independence.    Pt will independently identify and utilize 2-3 positive coping strategies to enhance overall wellbeing and engagement in valued occupations.    Chayo Mena MS, OTR/L

## 2025-05-09 NOTE — ASSESSMENT & PLAN NOTE
Hemoglobin at target for ESRD/above target  Receives Venofer 50 mg weekly at outpatient dialysis  Not on Mircera per outpatient records.  Patient does have history of CVA.  No HETAL indicated

## 2025-05-09 NOTE — ASSESSMENT & PLAN NOTE
Longstanding history of resistant hypertension  Patient and mother report that recently blood pressure has been fairly well controlled.  Unclear exacerbation prior to admission.  No change in medication, no missed doses, no change in diet.  Current regimen: Labetalol 200 mg in the a.m., 400 mg in the afternoon and 400 mg at bedtime, clonidine 0.2 mg 3 times a day, hydralazine 100 mg 3 times a day, every 8 hours, nifedipine 90 mg twice a day, losartan 100 mg daily, torsemide 100 mg daily  Prior echocardiogram November 2024: Ejection fraction 65%.  Grade 1 diastolic dysfunction.  Right ventricular systolic function normal.  No significant valvular dysfunction.  Small pericardial effusion.  Oral medications being supplemented with IV hydralazine  Blood pressure improving.  Troponin flat  Permissive hypertension in light of strokelike symptoms on admission.

## 2025-05-09 NOTE — ASSESSMENT & PLAN NOTE
Hypertensive up to 240/114 on admission, on extensive hypertensive regimen at home and indicates compliance with these medications  No recent change of diet, physical activity, or stress to explain acute rise of blood pressure  Blood pressure is regularly checked at home and baseline appears to be around 180 SBP  PTA meds:  Clonidine 0.2 mg twice daily  Labetalol 200 mg in the morning, 400 mg at 5 PM, and 400 mg at bedtime  Losartan 100 mg daily  Nifedipine 90 mg twice daily  Torsemide 100 mg daily  Given 400 mg labetalol, and 100 mg hydralazine orally in the ED    Plan:  As per neurology, maintain permissive hypertension with SBP goal of less than 210, and MAP above 100  Continue home medications while inpatient  Given additional 10 mg hydralazine IV dose

## 2025-05-09 NOTE — ASSESSMENT & PLAN NOTE
Outpatient treatment center: [facility]  Treatment days: Monday, Wednesday, Friday  Target weight 87 kg  Obtain standing scale weight prior to dialysis  Outpatient prescription:  mL/min, daily 800 mL/min, treatment time 4 hours, sodium 137, bicarb 35, 2K.  Machine temp 37.  Filter, Optiflux 180  Last outpatient treatment on 5/7  Patient presents on 5/8 with strokelike symptoms and hypertensive urgency.  Treatment plan for 5/9 in the afternoon.  Avoid hypotension in light of recent events.  Aim to keep systolic blood pressure above 140-150 on dialysis

## 2025-05-09 NOTE — CONSULTS
NEPHROLOGY HOSPITAL CONSULTATION   Mateus Mckeon 39 y.o. male MRN: [id number]  Unit/Bed#: S -01 Encounter: [id number]    Brief History of Admission -39-year-old with a complex past medical history who was admitted with vertigo.  Neurology following along stroke pathway.  Nephrology consulted for management of ESRD/hemodialysis    Assessment & Plan  Stroke-like symptoms  Neurology following  History of prior CVA x 3  Presented with vertigo and vomiting along with hypertensive urgency  MRI pending  ESRD (end stage renal disease) on dialysis (Formerly [facility])  Outpatient treatment center: [facility]  Treatment days: Monday, Wednesday, Friday  Target weight 87 kg  Obtain standing scale weight prior to dialysis  Outpatient prescription:  mL/min, daily 800 mL/min, treatment time 4 hours, sodium 137, bicarb 35, 2K.  Machine temp 37.  Filter, Optiflux 180  Last outpatient treatment on 5/7  Patient presents on 5/8 with strokelike symptoms and hypertensive urgency.  Treatment plan for 5/9 in the afternoon.  Avoid hypotension in light of recent events.  Aim to keep systolic blood pressure above 140-150 on dialysis    Hypertensive urgency  Longstanding history of resistant hypertension  Patient and mother report that recently blood pressure has been fairly well controlled.  Unclear exacerbation prior to admission.  No change in medication, no missed doses, no change in diet.  Current regimen: Labetalol 200 mg in the a.m., 400 mg in the afternoon and 400 mg at bedtime, clonidine 0.2 mg 3 times a day, hydralazine 100 mg 3 times a day, every 8 hours, nifedipine 90 mg twice a day, losartan 100 mg daily, torsemide 100 mg daily  Prior echocardiogram November 2024: Ejection fraction 65%.  Grade 1 diastolic dysfunction.  Right ventricular systolic function normal.  No significant valvular dysfunction.  Small pericardial effusion.  Oral medications being supplemented with IV hydralazine  Blood pressure improving.  Troponin  flat  Permissive hypertension in light of strokelike symptoms on admission.  Type 1 diabetes ([facility])  Lab Results   Component Value Date    HGBA1C 6.9 (H) 11/05/2024   Management per primary team  Secondary hyperparathyroidism of renal origin ([facility])  Continue calcitriol on dialysis days  Outpatient monitoring  Anemia in ESRD (end-stage renal disease)  ([facility])  Hemoglobin at target for ESRD/above target  Receives Venofer 50 mg weekly at outpatient dialysis  Not on Mircera per outpatient records.  Patient does have history of CVA.  No HETAL indicated   Chronic kidney disease-mineral bone disorder (CKD-MBD) with stage 5 chronic kidney disease, on chronic dialysis ([facility])  Continue phosphorus binder  Outpatient monitoring    I have reviewed the nephrology recommendations including hemodialysis this afternoon, with primary team and patient and mother, and we are in agreement with renal plan including the information outlined above.    HISTORY OF PRESENT ILLNESS:  Requesting Physician: Gloria Greer*  Reason for Consult: ESRD on hemodialysis    Mateus Mckeon is a 39 y.o. male with a longstanding history of end-stage renal disease on dialysis x 7 years, longstanding history of hypertension, CVA x 3, ICH, SAH 2024, diabetes mellitus type 1 (diagnosed at age 1), neuropathy, retinopathy/cataracts, orthostatic hypotension, RACHEAL, who was admitted to [facility] after presenting with spinning sensation.  Patient reports that symptoms were exacerbated by movement.  He would bend over to pet his dog/turn in bed and had a spinning sensation.  It was associated with nausea and vomiting x 1.  Has been compliant with outpatient dialysis and blood pressure has been quite good. A renal consultation is requested today for assistance in the management of ESRD/hemodialysis.  Paulo reports that since his stroke he always has some degree of lightheadedness or dizziness but had persistent vertigo which was exacerbated by movement.   He does get lightheaded upon standing if he gets up too quickly.  He has not had any syncopal events.    PAST MEDICAL HISTORY:  Past Medical History:   Diagnosis Date    Acute kidney injury (HCC)     Ambulates with cane     Anuria     Anxiety     Cellulitis of right elbow 03/31/2021    Chronic kidney disease     COVID-19 10/18/2024    Depression     Diabetes mellitus (HCC)     Diarrhea     Emesis 10/24/2020    End stage renal disease (HCC) 02/11/2018    Formatting of this note might be different from the original. Last Assessment & Plan:  Secondary to DM.  On nightly PD.  Followed by Nephro.  Patient considering transplant for kidney and pancreas through [facility] Formatting of this note might be different from the original. Last Assessment & Plan:  Formatting of this note might be different from the original. 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    Esophagitis 07/21/2015    Falls     Gastroparesis     GERD (gastroesophageal reflux disease)     History of shingles 2010    History of transfusion 02/2018    no adverse reaction    Hyperlipidemia     Hyperphosphatemia     Hypertension     Hypoglycemia 07/15/2022    Itching     Mastoiditis of right side 07/15/2022    Muscle weakness     general unsteadiness    Obesity (BMI 30.0-34.9) 09/09/2019    Orthostatic hypotension 10/25/2020    Peripheral polyneuropathy 11/20/2019    PONV (postoperative nausea and vomiting) 01/26/2018    Protein-calorie malnutrition (HCC) 11/23/2020    Recurrent peritonitis (HCC) due to peritoneal dialysis catheter 07/31/2020    Retinopathy     Seizures (HCC)     early 2020 - one time    Skin abnormality     some dime size areas where skin was scratched from itching    Sleep apnea     Spontaneous bacterial peritonitis (HCC) 10/19/2020    Squamous cell skin cancer     left temple    Stroke (HCC)     x2 - off balance/no driving/fatigue    Swelling of both lower extremities     Swelling of joint of upper arm,  right 04/03/2024    Traumatic onycholysis 07/21/2022    Vomiting     Wears glasses     Word finding difficulty 11/05/2024       PAST SURGICAL HISTORY:  Past Surgical History:   Procedure Laterality Date    CARDIAC ELECTROPHYSIOLOGY PROCEDURE N/A 9/21/2023    Procedure: Cardiac loop recorder explant;  Surgeon: Parish Morgan MD;  Location: [facility];  Service: Cardiology    CARDIAC LOOP RECORDER  05/2018    COLONOSCOPY      EGD      EYE SURGERY Right     HEMODIALYSIS ADULT  11/6/2024    IR AV FISTULAGRAM/GRAFTOGRAM  02/23/2021    IR CEREBRAL ANGIOGRAPHY  1/12/2024    IR CEREBRAL ANGIOGRAPHY / INTERVENTION  1/5/2024    IR TUNNELED CENTRAL LINE PLACEMENT  02/16/2021    IR TUNNELED DIALYSIS CATHETER PLACEMENT  11/18/2020    IR TUNNELED DIALYSIS CATHETER REMOVAL  02/12/2021    IR TUNNELED DIALYSIS CATHETER REMOVAL  03/11/2021    MOHS SURGERY Left 12/14/2022    Left temple with Dr. Hassan    PERITONEAL CATHETER INSERTION N/A 08/27/2018    Procedure: UNROOF PD CATHETER;  Surgeon: Felipe Lindo DO;  Location: [facility];  Service: General    WA ARTERIOVENOUS ANASTOMOSIS OPEN DIRECT Left 11/09/2020    Procedure: CREATION FISTULA  ARTERIOVENOUS (AV) - LEFT WRIST;  Surgeon: Placido Altamirano MD;  Location: [facility];  Service: Vascular    WA ESOPHAGOGASTRODUODENOSCOPY TRANSORAL DIAGNOSTIC N/A 04/18/2019    Procedure: ESOPHAGOGASTRODUODENOSCOPY (EGD);  Surgeon: Ale Figueroa MD;  Location: [facility];  Service: Gastroenterology    WA LAPS INSERTION TUNNELED INTRAPERITONEAL CATHETER N/A 08/06/2018    Procedure: LAPAROSCOPIC PD CATHETER PLACEMENT;  Surgeon: Felipe Lindo DO;  Location: [facility];  Service: General    WA REMOVAL TUNNELED INTRAPERITONEAL CATHETER N/A 11/18/2020    Procedure: REMOVAL CATHETER PERITONEAL DIALYSIS;  Surgeon: Abdifatah Ty MD;  Location: [facility];  Service: General    TONSILLECTOMY      UPPER GASTROINTESTINAL ENDOSCOPY         ALLERGIES:  Allergies   Allergen Reactions    Sulfa  Antibiotics Rash       SOCIAL HISTORY:  Social History     Substance and Sexual Activity   Alcohol Use Not Currently     Social History     Substance and Sexual Activity   Drug Use Yes    Types: Marijuana    Comment: medical marijuana last vaped 2024     Social History     Tobacco Use   Smoking Status Former    Current packs/day: 0.00    Average packs/day: 0.5 packs/day for 12.0 years (6.0 ttl pk-yrs)    Types: Cigarettes    Start date: 2006    Quit date: 2018    Years since quittin.2   Smokeless Tobacco Never   Tobacco Comments    quit 2018       FAMILY HISTORY:  Family History   Problem Relation Age of Onset    Breast cancer Mother     Hypertension Mother     Hyperlipidemia Father     Hypertension Father     Leukemia Maternal Grandmother     Hyperlipidemia Maternal Grandfather     Hypertension Maternal Grandfather     Hyperlipidemia Paternal Grandmother     Hypertension Paternal Grandmother     Heart disease Paternal Grandfather         cardiac disorder    Diabetes Paternal Grandfather        MEDICATIONS:    Current Facility-Administered Medications:     aspirin (ECOTRIN LOW STRENGTH) EC tablet 81 mg, 81 mg, Oral, Daily, Aime Head Jr., DO, 81 mg at 25    atorvastatin (LIPITOR) tablet 40 mg, 40 mg, Oral, QPM, Aime Head Jr. DO, 40 mg at 25 2300    calcium acetate (PHOSLO) capsule 667 mg, 667 mg, Oral, TID, Aime Head Jr. DO, 667 mg at 25 08    calcium carbonate (TUMS) chewable tablet 750 mg, 750 mg, Oral, Daily PRN, Aime Head Jr., DO, 750 mg at 25 2313    cinacalcet (SENSIPAR) tablet 60 mg, 60 mg, Oral, Daily, Aime Head Jr. DO, 60 mg at 25    cloNIDine (CATAPRES) tablet 0.2 mg, 0.2 mg, Oral, BID, Aime Head Jr., DO, 0.2 mg at 25 08    escitalopram (LEXAPRO) tablet 20 mg, 20 mg, Oral, Daily, Aime Head Jr. DO, 20 mg at 25 08     gabapentin (NEURONTIN) capsule 100 mg, 100 mg, Oral, Daily, Aime Head Jr., DO, 100 mg at 05/09/25 0806    gabapentin (NEURONTIN) capsule 200 mg, 200 mg, Oral, HS, Aime Head Jr., DO, 200 mg at 05/08/25 2259    heparin (porcine) subcutaneous injection 5,000 Units, 5,000 Units, Subcutaneous, Q8H HALIE, 5,000 Units at 05/09/25 0549 **AND** [COMPLETED] Platelet count, , , Once, Aime Head Jr., DO    hydrALAZINE (APRESOLINE) tablet 100 mg, 100 mg, Oral, Q8H HALIE, Aime Head Jr., DO, 100 mg at 05/09/25 0549    hydrOXYzine HCL (ATARAX) tablet 10 mg, 10 mg, Oral, Q6H PRN, Aime Head Jr., DO    insulin lispro (humALOG/ADMELOG) FOR PUMP REFILLS 50 Units, 50 Units, Subcutaneous Insulin Pump, Daily PRN, Aime Head Jr., DO    labetalol (NORMODYNE) tablet 200 mg, 200 mg, Oral, Daily, Aime Head Jr., DO, 200 mg at 05/09/25 0806    labetalol (NORMODYNE) tablet 400 mg, 400 mg, Oral, Daily, Aime Head Jr., DO    labetalol (NORMODYNE) tablet 400 mg, 400 mg, Oral, HS, Aime Head Jr., DO    losartan (COZAAR) tablet 100 mg, 100 mg, Oral, Daily, Aime Head Jr., DO, 100 mg at 05/09/25 0806    NIFEdipine (PROCARDIA XL) 24 hr tablet 90 mg, 90 mg, Oral, BID, Aime Head Jr., DO, 90 mg at 05/09/25 0808    ondansetron (ZOFRAN) injection 4 mg, 4 mg, Intravenous, Q6H PRN, Lara Amadro MD    PATIENT MAINTAINED INSULIN PUMP 1 each, 1 each, Subcutaneous, Q8H, Aime Head Jr., DO, 1 each at 05/09/25 0014    torsemide (DEMADEX) tablet 100 mg, 100 mg, Oral, Daily, Aime Head Jr., DO, 100 mg at 05/09/25 0806    traZODone (DESYREL) tablet 50 mg, 50 mg, Oral, HS PRN, Aime Head Jr., DO    REVIEW OF SYSTEMS:  Constitutional: No fevers or chills  HENT: Recently having problems with sinus congestion which has been helped by  Flomax  Eyes: Ongoing issues with vision/cataract  Respiratory: Negative for cough, shortness of breath and wheezing.   Cardiovascular: Negative for chest pain, palpitations and leg swelling.   Gastrointestinal: Negative for abdominal pain, constipation, diarrhea.  Positive for nausea vomiting  Genitourinary: No dysuria, hematuria  Endocrine: Negative for polyuria.   Musculoskeletal: Negative for usual arthralgias, back pain and joint swelling.   Skin: Negative for rash.   Neurological: Negative for focal weakness.  Positive for feeling of dizziness on a regular basis and recent vertigo  Hematological: Negative for easy bruising or bleeding.  Psychiatric/Behavioral: Negative for confusion  All the systems were reviewed and were negative except as documented on the HPI.    PHYSICAL EXAM:  Current Weight: Weight - Scale: 85.7 kg (189 lb)  First Weight: Weight - Scale: 86 kg (189 lb 9.5 oz)  Vitals:    05/09/25 0210 05/09/25 0252 05/09/25 0412 05/09/25 0724   BP: (!) 205/101 (!) 205/95 (!) 211/95 (!) 208/99   Pulse: 61 63 62 64   Resp:    16   Temp:    97.6 °F (36.4 °C)   TempSrc:       SpO2: 95% 97% 99% 97%   Weight:       Height:         No intake or output data in the 24 hours ending 05/09/25 0826  Physical Exam  Vitals reviewed.   Constitutional:       General: He is not in acute distress.     Appearance: Normal appearance. He is well-developed. He is not ill-appearing, toxic-appearing or diaphoretic.   HENT:      Head: Normocephalic and atraumatic.      Nose: Nose normal. No congestion.      Mouth/Throat:      Mouth: Mucous membranes are moist.   Eyes:      General: No scleral icterus.        Right eye: No discharge.         Left eye: No discharge.      Extraocular Movements: Extraocular movements intact.      Conjunctiva/sclera: Conjunctivae normal.   Neck:      Thyroid: No thyromegaly.      Vascular: No carotid bruit or JVD.      Trachea: No tracheal deviation.   Cardiovascular:      Rate and Rhythm: Normal rate  "and regular rhythm.      Heart sounds: No murmur heard.     No friction rub. No gallop.      Arteriovenous access: Left arteriovenous access is present.     Comments: + Bruit  Pulmonary:      Effort: Pulmonary effort is normal. No respiratory distress.      Breath sounds: Normal breath sounds. No stridor. No wheezing, rhonchi or rales.   Abdominal:      General: Bowel sounds are normal. There is no distension.      Palpations: Abdomen is soft. There is no mass.      Tenderness: There is no abdominal tenderness. There is no guarding or rebound.   Musculoskeletal:         General: No tenderness or signs of injury. Normal range of motion.      Cervical back: Normal range of motion and neck supple. No rigidity or tenderness.      Right lower leg: No edema.      Left lower leg: No edema.   Skin:     General: Skin is warm and dry.      Capillary Refill: Capillary refill takes less than 2 seconds.      Coloration: Skin is not jaundiced or pale.      Findings: No erythema or rash.   Neurological:      Mental Status: He is alert and oriented to person, place, and time.   Psychiatric:         Mood and Affect: Mood normal.         Behavior: Behavior normal.         Thought Content: Thought content normal.         Judgment: Judgment normal.           Invasive Devices:      Lab Results:   Results from last 7 days   Lab Units 05/09/25  0509 05/09/25  0003 05/08/25  1903   WBC Thousand/uL 5.08  --  5.34   HEMOGLOBIN g/dL 11.9*  --  11.7*   HEMATOCRIT % 36.5  --  34.8*   PLATELETS Thousands/uL 231 225 256   POTASSIUM mmol/L  --   --  4.8   CHLORIDE mmol/L  --   --  98   CO2 mmol/L  --   --  30   BUN mg/dL  --   --  25   CREATININE mg/dL  --   --  8.49*   CALCIUM mg/dL  --   --  9.1   ALK PHOS U/L 70  --   --    ALT U/L 11  --   --    AST U/L 18  --   --      Other Studies:     Portions of the record may have been created with voice recognition software. Occasional wrong word or \"sound a like\" substitutions may have occurred due to " the inherent limitations of voice recognition software. Read the chart carefully and recognize, using context, where substitutions have occurred.If you have any questions, please contact the dictating provider.

## 2025-05-09 NOTE — ASSESSMENT & PLAN NOTE
Lab Results   Component Value Date    HGBA1C 6.9 (H) 11/05/2024       Recent Labs     05/08/25  1923 05/09/25  0356   POCGLU 182* 134       Blood Sugar Average: Last 72 hrs:  (P) 158

## 2025-05-09 NOTE — ASSESSMENT & PLAN NOTE
>>ASSESSMENT AND PLAN FOR ESRD (END STAGE RENAL DISEASE) ON DIALYSIS (HCC) WRITTEN ON 5/9/2025  9:48 AM BY DEISI CABRERA    Outpatient treatment center: [facility]  Treatment days: Monday, Wednesday, Friday  Target weight 87 kg  Obtain standing scale weight prior to dialysis  Outpatient prescription:  mL/min, daily 800 mL/min, treatment time 4 hours, sodium 137, bicarb 35, 2K.  Machine temp 37.  Filter, Optiflux 180  Last outpatient treatment on 5/7  Patient presents on 5/8 with strokelike symptoms and hypertensive urgency.  Treatment plan for 5/9 in the afternoon.  Avoid hypotension in light of recent events.  Aim to keep systolic blood pressure above 140-150 on dialysis

## 2025-05-09 NOTE — ASSESSMENT & PLAN NOTE
Presenting with strokelike symptoms which began upon waking up this morning which included dizziness, headache, and nausea  Has hx of 3 prior CVAs with residual right upper extremity weakness currently on aspirin and atorvastatin  States that dizziness was not associated with movement and felt as if the room was spinning around him  CT head: No acute intracranial abnormality  CTA head and neck: No large vessel occlusion or stenosis or aneurysm of the head.  Stable moderate stenosis of the left cavernous and supraclinoid internal carotid artery.  No significant stenosis or dissection of neck  Neurology consult: Recommend admission along stroke pathway    Plan:  Continue aspirin and atorvastatin  Permissive hypertension, as per neurology: Maintain SBP less than 210, and MAP above 100  MRI brain ordered  Check lipid panel  Check A1c  Will defer echo for now as patient recently had echo in November which was unremarkable  Every hour vital sign and neuro checks for 4 hours, then every 2 hour for 8 hours, and vitals every 4 for 72 hours

## 2025-05-10 ENCOUNTER — APPOINTMENT (INPATIENT)
Dept: MRI IMAGING | Facility: HOSPITAL | Age: 40
DRG: 304 | End: 2025-05-10
Payer: MEDICARE

## 2025-05-10 VITALS
RESPIRATION RATE: 19 BRPM | HEART RATE: 73 BPM | DIASTOLIC BLOOD PRESSURE: 80 MMHG | WEIGHT: 189 LBS | HEIGHT: 71 IN | TEMPERATURE: 98.5 F | SYSTOLIC BLOOD PRESSURE: 144 MMHG | BODY MASS INDEX: 26.46 KG/M2 | OXYGEN SATURATION: 93 %

## 2025-05-10 LAB
ANION GAP SERPL CALCULATED.3IONS-SCNC: 11 MMOL/L (ref 4–13)
BUN SERPL-MCNC: 21 MG/DL (ref 5–25)
CALCIUM SERPL-MCNC: 9 MG/DL (ref 8.4–10.2)
CHLORIDE SERPL-SCNC: 95 MMOL/L (ref 96–108)
CO2 SERPL-SCNC: 28 MMOL/L (ref 21–32)
CREAT SERPL-MCNC: 7.66 MG/DL (ref 0.6–1.3)
ERYTHROCYTE [DISTWIDTH] IN BLOOD BY AUTOMATED COUNT: 15.3 % (ref 11.6–15.1)
GFR SERPL CREATININE-BSD FRML MDRD: 7 ML/MIN/1.73SQ M
GLUCOSE SERPL-MCNC: 203 MG/DL (ref 65–140)
GLUCOSE SERPL-MCNC: 214 MG/DL (ref 65–140)
GLUCOSE SERPL-MCNC: 259 MG/DL (ref 65–140)
HCT VFR BLD AUTO: 37.3 % (ref 36.5–49.3)
HGB BLD-MCNC: 12.3 G/DL (ref 12–17)
MCH RBC QN AUTO: 33.6 PG (ref 26.8–34.3)
MCHC RBC AUTO-ENTMCNC: 33 G/DL (ref 31.4–37.4)
MCV RBC AUTO: 102 FL (ref 82–98)
PLATELET # BLD AUTO: 223 THOUSANDS/UL (ref 149–390)
PMV BLD AUTO: 10.4 FL (ref 8.9–12.7)
POTASSIUM SERPL-SCNC: 5 MMOL/L (ref 3.5–5.3)
RBC # BLD AUTO: 3.66 MILLION/UL (ref 3.88–5.62)
SODIUM SERPL-SCNC: 134 MMOL/L (ref 135–147)
WBC # BLD AUTO: 5.47 THOUSAND/UL (ref 4.31–10.16)

## 2025-05-10 PROCEDURE — 82948 REAGENT STRIP/BLOOD GLUCOSE: CPT

## 2025-05-10 PROCEDURE — 85027 COMPLETE CBC AUTOMATED: CPT

## 2025-05-10 PROCEDURE — 99232 SBSQ HOSP IP/OBS MODERATE 35: CPT | Performed by: INTERNAL MEDICINE

## 2025-05-10 PROCEDURE — 70551 MRI BRAIN STEM W/O DYE: CPT

## 2025-05-10 PROCEDURE — 99238 HOSP IP/OBS DSCHRG MGMT 30/<: CPT | Performed by: INTERNAL MEDICINE

## 2025-05-10 PROCEDURE — 80048 BASIC METABOLIC PNL TOTAL CA: CPT

## 2025-05-10 RX ORDER — CLONIDINE HYDROCHLORIDE 0.1 MG/1
0.2 TABLET ORAL EVERY 8 HOURS SCHEDULED
Status: DISCONTINUED | OUTPATIENT
Start: 2025-05-10 | End: 2025-05-10 | Stop reason: HOSPADM

## 2025-05-10 RX ADMIN — ESCITALOPRAM OXALATE 20 MG: 20 TABLET ORAL at 08:53

## 2025-05-10 RX ADMIN — NIFEDIPINE 90 MG: 30 TABLET, FILM COATED, EXTENDED RELEASE ORAL at 08:54

## 2025-05-10 RX ADMIN — CALCIUM ACETATE 667 MG: 667 CAPSULE ORAL at 08:54

## 2025-05-10 RX ADMIN — HYDRALAZINE HYDROCHLORIDE 100 MG: 25 TABLET ORAL at 08:53

## 2025-05-10 RX ADMIN — TORSEMIDE 100 MG: 100 TABLET ORAL at 08:53

## 2025-05-10 RX ADMIN — CLONIDINE HYDROCHLORIDE 0.2 MG: 0.1 TABLET ORAL at 08:53

## 2025-05-10 RX ADMIN — GABAPENTIN 100 MG: 100 CAPSULE ORAL at 08:53

## 2025-05-10 RX ADMIN — CINACALCET HYDROCHLORIDE 60 MG: 30 TABLET, FILM COATED ORAL at 08:54

## 2025-05-10 RX ADMIN — HEPARIN SODIUM 5000 UNITS: 5000 INJECTION INTRAVENOUS; SUBCUTANEOUS at 06:39

## 2025-05-10 RX ADMIN — LOSARTAN POTASSIUM 100 MG: 50 TABLET, FILM COATED ORAL at 08:53

## 2025-05-10 RX ADMIN — LABETALOL HYDROCHLORIDE 200 MG: 100 TABLET, FILM COATED ORAL at 08:54

## 2025-05-10 RX ADMIN — ASPIRIN 81 MG: 81 TABLET ORAL at 08:53

## 2025-05-10 NOTE — ASSESSMENT & PLAN NOTE
Longstanding history of resistant hypertension, blood pressure currently improving  Blood pressure 236/107 on presentation on 05/08, blood pressure this morning 144/76  Labetalol 200 mg in the a.m., 400 mg in the afternoon and 400 mg at bedtime  Clonidine 0.2 mg 3 times daily  Hydralazine 100 mg 3 times daily  Nifedipine 90 mg twice daily  Losartan 100 mg daily  Torsemide 100 mg daily  Changes: Increase clonidine to 0.2 mg 3 times daily

## 2025-05-10 NOTE — QUICK NOTE
MRI Brain wo contrast 5/10/25: No acute infarction, intracranial hemorrhage or mass effect. Moderate, chronic microangiopathy and chronic left corona radiata lacunar infarction are stable. 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    Impression: Patient is a 39-year-old male who presented to the ED as result stroke-like symptoms.  Patient's MRI was negative for any ischemic changes.  Given that patient's blood pressure was 227/104 on presentation, patient's symptoms most likely secondary to hypertensive urgency +/- double espresso intake.    Plan:  Continue home stroke prevention regimen with aspirin 81 mg daily and Lipitor 40 mg daily  Blood pressure goals: Normotension, defer management to primary team  Rest of care as per primary team  No further inpatient recommendations at this time.  Please contact neurology with any further questions or concerns.    Mateus Mckeon will need follow-up in at the next regular appointment with neurovascular team for Other in 30 minute appointment. They will not require outpatient neurological testing. Appt scheduled for 6/12/25 with Dr. Penny already.    Thank you for letting us take care of this patient.

## 2025-05-10 NOTE — ASSESSMENT & PLAN NOTE
>>ASSESSMENT AND PLAN FOR ESRD (END STAGE RENAL DISEASE) ON DIALYSIS (HCC) WRITTEN ON 5/10/2025  7:13 PM BY SARAH BONILLA MD    Lab Results   Component Value Date    EGFR 7 05/10/2025    EGFR 7 05/08/2025    EGFR 6 01/17/2025    CREATININE 7.66 (H) 05/10/2025    CREATININE 8.49 (H) 05/08/2025    CREATININE 9.42 (H) 01/17/2025   Creatinine stable  Receives dialysis every MWF    Plan:  Continue dialysis as per schedule  Follow-up with nephrology

## 2025-05-10 NOTE — ASSESSMENT & PLAN NOTE
Lab Results   Component Value Date    EGFR 7 05/10/2025    EGFR 7 05/08/2025    EGFR 6 01/17/2025    CREATININE 7.66 (H) 05/10/2025    CREATININE 8.49 (H) 05/08/2025    CREATININE 9.42 (H) 01/17/2025   Follow-up with nephrology

## 2025-05-10 NOTE — ASSESSMENT & PLAN NOTE
Hypertensive up to 240/114 on admission, on extensive hypertensive regimen at home and indicates compliance with these medications  No recent change of diet, physical activity, or stress to explain acute rise of blood pressure  Blood pressure is regularly checked at home and baseline appears to be around 180 SBP  PTA meds:  Clonidine 0.2 mg 3 times daily  Labetalol 200 mg in the morning, 400 mg at 5 PM, and 400 mg at bedtime  Losartan 100 mg daily  Nifedipine 90 mg twice daily  Torsemide 100 mg daily  Given 400 mg labetalol, and 100 mg hydralazine orally in the ED    Plan:  Continue home medications  Follow-up with primary care

## 2025-05-10 NOTE — PROGRESS NOTES
NEPHROLOGY HOSPITAL PROGRESS NOTE   Mateus Mckeon 39 y.o. male MRN: [id number]  Unit/Bed#: S -01 Encounter: [id number]  Reason for Consult: ESRD on HD  Assessment & Plan  Stroke-like symptoms  History of prior CVA x 3  CTH - no acute findings  CTA - no LVO or other acute findings  MRI brain pending at this time  Management per neurology  ESRD (end stage renal disease) on dialysis ([facility])  MWF at [facility]  EDW 87 kg  Access: Left arm AV access  Last outpatient treatment: 05/07  Last inpatient treatment: 05/09 (preweight 89.3 kg, post weight 87 kg)  Next inpatient treatment: 05/12  Electrolytes and volume status stable today  Hypertensive urgency  Longstanding history of resistant hypertension, blood pressure currently improving  Blood pressure 236/107 on presentation on 05/08, blood pressure this morning 144/76  Labetalol 200 mg in the a.m., 400 mg in the afternoon and 400 mg at bedtime  Clonidine 0.2 mg 3 times daily  Hydralazine 100 mg 3 times daily  Nifedipine 90 mg twice daily  Losartan 100 mg daily  Torsemide 100 mg daily  Changes: Increase clonidine to 0.2 mg 3 times daily  Type 1 diabetes ([facility])  Management per primary team  Secondary hyperparathyroidism of renal origin ([facility])  Continue calcitriol 0.75 mcg 3 times weekly  Continue Sensipar 60 mg daily  Monitor PTH as outpatient  Anemia in ESRD (end-stage renal disease)  ([facility])  Hemoglobin today 12.3  Not on Mircera as outpatient due to history of CVA  Chronic kidney disease-mineral bone disorder (CKD-MBD) with stage 5 chronic kidney disease, on chronic dialysis ([facility])  Continue PhosLo 1 tablet 3 times daily with meals  Monitor phosphorus level as outpatient    I have reviewed the nephrology recommendations including no indication for dialysis today and that patient is stable for interdialytic timeframe, with internal medicine team, and we are in agreement with renal plan including the information outlined above.    SUBJECTIVE / 24H INTERVAL  HISTORY:  Status post dialysis yesterday with 2 L UF.  Blood pressure better.  Denies dyspnea.  Denies leg swelling.  Waiting for brain MRI.    OBJECTIVE:  Current Weight: Weight - Scale: 85.7 kg (189 lb)  Vitals:    05/09/25 2016 05/09/25 2230 05/10/25 0236 05/10/25 0546   BP: 135/79 (!) 171/91 160/81 144/76   BP Location:       Pulse: 76 72 73 78   Resp:  19     Temp: 98.2 °F (36.8 °C) 98.3 °F (36.8 °C) 98.1 °F (36.7 °C)    TempSrc:       SpO2: 91% 98% 94% 97%   Weight:       Height:           Intake/Output Summary (Last 24 hours) at 5/10/2025 0726  Last data filed at 5/9/2025 1815  Gross per 24 hour   Intake 500 ml   Output 2551 ml   Net -2051 ml     Review of Systems   Constitutional:  Negative for chills and fever.   HENT:  Negative for ear pain and sore throat.    Eyes:  Negative for pain and visual disturbance.   Respiratory:  Negative for cough and shortness of breath.    Cardiovascular:  Negative for chest pain and palpitations.   Gastrointestinal:  Negative for abdominal pain and vomiting.   Genitourinary:  Negative for dysuria and hematuria.   Musculoskeletal:  Negative for arthralgias and back pain.   Skin:  Negative for color change and rash.   Neurological:  Negative for seizures and syncope.   All other systems reviewed and are negative.    Physical Exam  Vitals and nursing note reviewed.   Constitutional:       General: He is not in acute distress.     Appearance: He is well-developed.   HENT:      Head: Normocephalic and atraumatic.   Eyes:      Conjunctiva/sclera: Conjunctivae normal.   Cardiovascular:      Rate and Rhythm: Normal rate and regular rhythm.      Pulses: Normal pulses.      Heart sounds: Normal heart sounds. No murmur heard.     Comments: Left forearm AV access with positive thrill and bruit  Pulmonary:      Effort: Pulmonary effort is normal. No respiratory distress.      Breath sounds: Normal breath sounds.   Abdominal:      Palpations: Abdomen is soft.      Tenderness: There is no  abdominal tenderness.   Musculoskeletal:         General: No swelling.      Cervical back: Neck supple.      Right lower leg: No edema.      Left lower leg: No edema.   Skin:     General: Skin is warm and dry.      Capillary Refill: Capillary refill takes less than 2 seconds.   Neurological:      Mental Status: He is alert.   Psychiatric:         Mood and Affect: Mood normal.       Medications:    Current Facility-Administered Medications:     aspirin (ECOTRIN LOW STRENGTH) EC tablet 81 mg, 81 mg, Oral, Daily, Aime Head Jr., DO, 81 mg at 05/09/25 0806    atorvastatin (LIPITOR) tablet 40 mg, 40 mg, Oral, QPM, Aime Head Jr., DO, 40 mg at 05/09/25 1909    calcitriol (ROCALTROL) capsule 0.75 mcg, 0.75 mcg, Oral, Once per day on Monday Wednesday Friday, Tabitha Alvaradok, GATONP, 0.75 mcg at 05/09/25 1028    calcium acetate (PHOSLO) capsule 667 mg, 667 mg, Oral, TID, Aime Head Jr., DO, 667 mg at 05/09/25 1909    calcium carbonate (TUMS) chewable tablet 750 mg, 750 mg, Oral, Daily PRN, Aime Head Jr., DO, 750 mg at 05/08/25 2313    cinacalcet (SENSIPAR) tablet 60 mg, 60 mg, Oral, Daily, Aime Head Jr., DO, 60 mg at 05/09/25 0806    cloNIDine (CATAPRES) tablet 0.2 mg, 0.2 mg, Oral, Q8H [facility], Quinten Henson MD    escitalopram (LEXAPRO) tablet 20 mg, 20 mg, Oral, Daily, Aime Head Jr., DO, 20 mg at 05/09/25 0806    gabapentin (NEURONTIN) capsule 100 mg, 100 mg, Oral, Daily, Aime Head Jr., DO, 100 mg at 05/09/25 0806    gabapentin (NEURONTIN) capsule 200 mg, 200 mg, Oral, HS, Aime Head Jr., DO, 200 mg at 05/09/25 2110    heparin (porcine) subcutaneous injection 5,000 Units, 5,000 Units, Subcutaneous, Q8H HALIE, 5,000 Units at 05/10/25 0639 **AND** [COMPLETED] Platelet count, , , Once, Aime Head Jr., DO    hydrALAZINE (APRESOLINE) tablet 100 mg, 100 mg, Oral, Q8H [facility], Aime Haque  "Sonido Jones DO, 100 mg at 05/09/25 2111    hydrOXYzine HCL (ATARAX) tablet 10 mg, 10 mg, Oral, Q6H PRN, Aime Head Jr., DO    insulin lispro (humALOG/ADMELOG) FOR PUMP REFILLS 50 Units, 50 Units, Subcutaneous Insulin Pump, Daily PRN, Aime Head Jr., DO    labetalol (NORMODYNE) tablet 200 mg, 200 mg, Oral, Daily, Aime Head Jr., DO, 200 mg at 05/09/25 0806    labetalol (NORMODYNE) tablet 400 mg, 400 mg, Oral, Daily, Aime Head Jr., DO    labetalol (NORMODYNE) tablet 400 mg, 400 mg, Oral, HS, Aiem Head Jr., DO, 400 mg at 05/09/25 2110    losartan (COZAAR) tablet 100 mg, 100 mg, Oral, Daily, Aime Head Jr., DO, 100 mg at 05/09/25 0806    NIFEdipine (PROCARDIA XL) 24 hr tablet 90 mg, 90 mg, Oral, BID, Aime Head Jr., DO, 90 mg at 05/09/25 2111    ondansetron (ZOFRAN) injection 4 mg, 4 mg, Intravenous, Q6H PRN, Lara Amador MD    PATIENT MAINTAINED INSULIN PUMP 1 each, 1 each, Subcutaneous, Q8H, Aime Head Jr., DO, 1 each at 05/09/25 2310    torsemide (DEMADEX) tablet 100 mg, 100 mg, Oral, Daily, Aime Head Jr., DO, 100 mg at 05/09/25 0806    traZODone (DESYREL) tablet 50 mg, 50 mg, Oral, HS PRN, Aime Head Jr., DO, 50 mg at 05/09/25 2119    Laboratory Results:  Results from last 7 days   Lab Units 05/10/25  0550 05/09/25  0509 05/09/25  0003 05/08/25  1903   WBC Thousand/uL 5.47 5.08  --  5.34   HEMOGLOBIN g/dL 12.3 11.9*  --  11.7*   HEMATOCRIT % 37.3 36.5  --  34.8*   PLATELETS Thousands/uL 223 231 225 256   POTASSIUM mmol/L 5.0  --   --  4.8   CHLORIDE mmol/L 95*  --   --  98   CO2 mmol/L 28  --   --  30   BUN mg/dL 21  --   --  25   CREATININE mg/dL 7.66*  --   --  8.49*   CALCIUM mg/dL 9.0  --   --  9.1       Portions of the record may have been created with voice recognition software. Occasional wrong word or \"sound a like\" substitutions " may have occurred due to the inherent limitations of voice recognition software. Read the chart carefully and recognize, using context, where substitutions have occurred.If you have any questions, please contact the dictating provider.

## 2025-05-10 NOTE — ASSESSMENT & PLAN NOTE
History of prior CVA x 3  CTH - no acute findings  CTA - no LVO or other acute findings  MRI brain pending at this time  Management per neurology

## 2025-05-10 NOTE — ASSESSMENT & PLAN NOTE
>>ASSESSMENT AND PLAN FOR ESRD (END STAGE RENAL DISEASE) ON DIALYSIS ([facility]) WRITTEN ON 5/10/2025  6:55 AM BY KIET YAO MD    MWF at [facility]  EDW 87 kg  Access: Left arm AV access  Last outpatient treatment: 05/07  Last inpatient treatment: 05/09 (preweight 89.3 kg, post weight 87 kg)  Next inpatient treatment: 05/12  Electrolytes and volume status stable today

## 2025-05-10 NOTE — ASSESSMENT & PLAN NOTE
Continue calcitriol 0.75 mcg 3 times weekly  Continue Sensipar 60 mg daily  Monitor PTH as outpatient

## 2025-05-10 NOTE — DISCHARGE SUMMARY
Discharge Summary - Hospitalist   Name: Mateus Mckeon 39 y.o. male I MRN: [id number]  Unit/Bed#: S -01 I Date of Admission: 5/8/2025   Date of Service: 5/10/2025 I Hospital Day: 1     Assessment & Plan  Stroke-like symptoms  Presenting with strokelike symptoms which began upon waking up this morning which included dizziness, headache, and nausea  Has hx of 3 prior CVAs with residual right upper extremity weakness currently on aspirin and atorvastatin  States that dizziness was not associated with movement and felt as if the room was spinning around him  CT head: No acute intracranial abnormality  CTA head and neck: No large vessel occlusion or stenosis or aneurysm of the head.  Stable moderate stenosis of the left cavernous and supraclinoid internal carotid artery.  No significant stenosis or dissection of neck  MRI negative for acute abnormality    Plan:  Continue aspirin 81 mg daily and atorvastatin 40 mg daily  Continue home medication to control blood pressure   Follow-up with nephrology order doses of clonidine  Follow-up with PCP  Ambulatory referral to PT and OT  Follow-up with Dr. Penny on 6/12/2025  Hypertensive urgency  Hypertensive up to 240/114 on admission, on extensive hypertensive regimen at home and indicates compliance with these medications  No recent change of diet, physical activity, or stress to explain acute rise of blood pressure  Blood pressure is regularly checked at home and baseline appears to be around 180 SBP  PTA meds:  Clonidine 0.2 mg 3 times daily  Labetalol 200 mg in the morning, 400 mg at 5 PM, and 400 mg at bedtime  Losartan 100 mg daily  Nifedipine 90 mg twice daily  Torsemide 100 mg daily  Given 400 mg labetalol, and 100 mg hydralazine orally in the ED    Plan:  Continue home medications  Follow-up with primary care  ESRD (end stage renal disease) on dialysis ([facility])  Lab Results   Component Value Date    EGFR 7 05/10/2025    EGFR 7 05/08/2025    EGFR 6 01/17/2025     CREATININE 7.66 (H) 05/10/2025    CREATININE 8.49 (H) 05/08/2025    CREATININE 9.42 (H) 01/17/2025   Creatinine stable  Receives dialysis every MWF    Plan:  Continue dialysis as per schedule  Follow-up with nephrology  Type 1 diabetes ([facility])  Lab Results   Component Value Date    HGBA1C 5.5 05/09/2025       Recent Labs     05/09/25  1214 05/09/25  2033 05/10/25  0755 05/10/25  1119   POCGLU 250* 197* 214* 203*       Blood Sugar Average: Last 72 hrs:  (P) 189.8936956375665337  Managed with insulin pump and G6 Dexcom  Follow-up with PCP  Secondary hyperparathyroidism of renal origin ([facility])  Continue Cinacalcet, PhosLo  Follow-up with nephrology  Anemia in ESRD (end-stage renal disease)  ([facility])  Lab Results   Component Value Date    EGFR 7 05/10/2025    EGFR 7 05/08/2025    EGFR 6 01/17/2025    CREATININE 7.66 (H) 05/10/2025    CREATININE 8.49 (H) 05/08/2025    CREATININE 9.42 (H) 01/17/2025   Follow-up with nephrology     Medical Problems       Resolved Problems  Date Reviewed: 4/3/2025   None       Discharging Physician / Practitioner: Elbert Cabello MD  PCP: Dania Sher DO  Admission Date:   Admission Orders (From admission, onward)       Ordered        05/09/25 1502  INPATIENT ADMISSION  Once            05/08/25 2017  Place in Observation  Once                          Discharge Date: 05/10/25    Consultations During Hospital Stay:  Neurology  Nephrology    Procedures Performed:   None    Significant Findings / Test Results:   None    Incidental Findings:   None    Test Results Pending at Discharge (will require follow up):   None     Outpatient Tests Requested:  None    Complications: None    Reason for Admission: Dizziness and headache    HPI:  Mateus Mckeon is a 39 y.o. male with a PMH of ESRD on dialysis, SAH, RACHEAL, HTN, GERD, and 3 prior CVA's who presents with dizziness and headache which began earlier today.  Patient states he woke up began to feel immediately dizzy sensation of the room spinning  around him, due to this he began to feel nauseous.  He does state this felt similar to his prior CVAs.  He also endorsed presence of a headache primarily located in the occipital region associated with photophobia.  Patient regularly mentions his blood pressure at home which both he and his parents state normally runs around 180s systolically.  When he measured his blood pressure today it was in the 200s which prompted them to come to the ED for further evaluation.  Patient and family state compliance with patient's home medications.  He denies any recent change in physical activity, diet, or medications.  He receives dialysis every MWF most recently yesterday, and tolerated dialysis well without any blood pressure issues.  On my evaluation patient continues to endorse occipital headache as well as dizziness and nausea.  He also endorses right upper extremity weakness however this is baseline for him given his prior CVAs. He does also endorse chronic neuropathy present in legs bilaterally.     Hospital Course:   After admission he was allowed to have permissive hypertension for 24 hours and then his blood pressure was under control with his home medications.  Had dialysis session once and PT OT evaluation recommended level 3.  He was pending for MRI this morning which came back negative for acute abnormality.  He is stable to be discharged today to home and ambulatory referral to PT OT sent as per mother's request.    Please see above list of diagnoses and related plan for additional information.     Condition at Discharge: fair    Discharge Day Visit / Exam:   Subjective:    No acute event overnight.  Patient was seen and examined at bedside this morning.  Endorses feeling comfortable without any new complaints.  Vitals: Blood Pressure: 144/80 (05/10/25 0756)  Pulse: 73 (05/10/25 0756)  Temperature: 98.5 °F (36.9 °C) (05/10/25 0756)  Temp Source: Temporal (05/09/25 1820)  Respirations: 19 (05/09/25 2230)  Height:  "5' 11\" (180.3 cm) (05/08/25 2300)  Weight - Scale: 85.7 kg (189 lb) (05/08/25 2300)  SpO2: 93 % (05/10/25 0756)  Physical Exam  Vitals and nursing note reviewed.   Constitutional:       General: He is not in acute distress.     Appearance: He is well-developed.   HENT:      Head: Normocephalic and atraumatic.   Eyes:      Conjunctiva/sclera: Conjunctivae normal.   Cardiovascular:      Rate and Rhythm: Normal rate and regular rhythm.      Heart sounds: No murmur heard.  Pulmonary:      Effort: Pulmonary effort is normal. No respiratory distress.      Breath sounds: Normal breath sounds.   Abdominal:      Palpations: Abdomen is soft.      Tenderness: There is no abdominal tenderness.   Musculoskeletal:         General: No swelling.      Cervical back: Neck supple.   Skin:     General: Skin is warm and dry.      Capillary Refill: Capillary refill takes less than 2 seconds.   Neurological:      Mental Status: He is alert.   Psychiatric:         Mood and Affect: Mood normal.          Discussion with Family: Updated  (mother) via phone.    Discharge instructions/Information to patient and family:   See after visit summary for information provided to patient and family.      Provisions for Follow-Up Care:  See after visit summary for information related to follow-up care and any pertinent home health orders.      Mobility at time of Discharge:   Basic Mobility Inpatient Raw Score: 18  JH-HLM Goal: 6: Walk 10 steps or more  JH-HLM Achieved: 7: Walk 25 feet or more  HLM Goal achieved. Continue to encourage appropriate mobility.     Disposition:   Home    Planned Readmission: No    Discharge Medications:  See after visit summary for reconciled discharge medications provided to patient and/or family.      Administrative Statements   Discharge Statement:  I have spent a total time of 30 minutes in caring for this patient on the day of the visit/encounter.    **Please Note: This note may have been constructed using " a voice recognition system**

## 2025-05-10 NOTE — ASSESSMENT & PLAN NOTE
Lab Results   Component Value Date    EGFR 7 05/10/2025    EGFR 7 05/08/2025    EGFR 6 01/17/2025    CREATININE 7.66 (H) 05/10/2025    CREATININE 8.49 (H) 05/08/2025    CREATININE 9.42 (H) 01/17/2025   Creatinine stable  Receives dialysis every MWF    Plan:  Continue dialysis as per schedule  Follow-up with nephrology

## 2025-05-10 NOTE — PLAN OF CARE
Problem: Nutrition/Hydration-ADULT  Goal: Nutrient/Hydration intake appropriate for improving, restoring or maintaining nutritional needs  Description: Monitor and assess patient's nutrition/hydration status for malnutrition. Collaborate with interdisciplinary team and initiate plan and interventions as ordered.  Monitor patient's weight and dietary intake as ordered or per policy. Utilize nutrition screening tool and intervene as necessary. Determine patient's food preferences and provide high-protein, high-caloric foods as appropriate. INTERVENTIONS:- Monitor oral intake, urinary output, labs, and treatment plans- Assess nutrition and hydration status and recommend course of action- Evaluate amount of meals eaten- Assist patient with eating if necessary - Allow adequate time for meals- Recommend/ encourage appropriate diets, oral nutritional supplements, and vitamin/mineral supplements- Order, calculate, and assess calorie counts as needed- Recommend, monitor, and adjust tube feedings and TPN/PPN based on assessed needs- Assess need for intravenous fluids- Provide specific nutrition/hydration education as appropriate- Include patient/family/caregiver in decisions related to nutrition  Outcome: Progressing     Problem: METABOLIC, FLUID AND ELECTROLYTES - ADULT  Goal: Electrolytes maintained within normal limits  Description: INTERVENTIONS:- Monitor labs and assess patient for signs and symptoms of electrolyte imbalances- Administer electrolyte replacement as ordered- Monitor response to electrolyte replacements, including repeat lab results as appropriate- Instruct patient on fluid and nutrition as appropriate  Outcome: Progressing  Goal: Fluid balance maintained  Description: INTERVENTIONS:- Monitor labs - Monitor I/O and WT- Instruct patient on fluid and nutrition as appropriate- Assess for signs & symptoms of volume excess or deficit  Outcome: Progressing

## 2025-05-10 NOTE — ASSESSMENT & PLAN NOTE
WILMA at [facility]  EDW 87 kg  Access: Left arm AV access  Last outpatient treatment: 05/07  Last inpatient treatment: 05/09 (preweight 89.3 kg, post weight 87 kg)  Next inpatient treatment: 05/12  Electrolytes and volume status stable today

## 2025-05-10 NOTE — ASSESSMENT & PLAN NOTE
Lab Results   Component Value Date    HGBA1C 5.5 05/09/2025       Recent Labs     05/09/25  1214 05/09/25 2033 05/10/25  0755 05/10/25  1119   POCGLU 250* 197* 214* 203*       Blood Sugar Average: Last 72 hrs:  (P) 189.0982892373637003  Managed with insulin pump and G6 Dexcom  Follow-up with PCP

## 2025-05-10 NOTE — ASSESSMENT & PLAN NOTE
Presenting with strokelike symptoms which began upon waking up this morning which included dizziness, headache, and nausea  Has hx of 3 prior CVAs with residual right upper extremity weakness currently on aspirin and atorvastatin  States that dizziness was not associated with movement and felt as if the room was spinning around him  CT head: No acute intracranial abnormality  CTA head and neck: No large vessel occlusion or stenosis or aneurysm of the head.  Stable moderate stenosis of the left cavernous and supraclinoid internal carotid artery.  No significant stenosis or dissection of neck  MRI negative for acute abnormality    Plan:  Continue aspirin 81 mg daily and atorvastatin 40 mg daily  Continue home medication to control blood pressure   Follow-up with nephrology order doses of clonidine  Follow-up with PCP  Ambulatory referral to PT and OT  Follow-up with Dr. Penny on 6/12/2025

## 2025-05-12 ENCOUNTER — TRANSITIONAL CARE MANAGEMENT (OUTPATIENT)
Age: 40
End: 2025-05-12

## 2025-05-12 ENCOUNTER — EVALUATION (OUTPATIENT)
Dept: SPEECH THERAPY | Facility: CLINIC | Age: 40
End: 2025-05-12
Attending: INTERNAL MEDICINE
Payer: MEDICARE

## 2025-05-12 DIAGNOSIS — I63.9 CEREBROVASCULAR ACCIDENT (CVA), UNSPECIFIED MECHANISM (HCC): ICD-10-CM

## 2025-05-12 DIAGNOSIS — R41.841 COGNITIVE COMMUNICATION DEFICIT: Primary | ICD-10-CM

## 2025-05-12 PROCEDURE — 96125 COGNITIVE TEST BY HC PRO: CPT

## 2025-05-12 NOTE — PROGRESS NOTES
"Re-Evaluation/ Speech Treatment Note    Today's date: 2025  Patient name: Mateus Mckeon  : 1985  MRN: [id number]  Referring provider: Dania Sher DO  Dx:   Encounter Diagnosis     ICD-10-CM    1. Cognitive communication deficit  R41.841       2. Cerebrovascular accident (CVA), unspecified mechanism (HCC)  I63.9                            POC expires Auth Status Total   Visits  Start date  Expiration date PT/OT + Visit Limit? Co-Insurance   25 Medicare  [facility] 3/13/25 N/A No  Yes                                             Visit/Unit Tracking  AUTH Status: [facility] Date               Visits  Authed: AFTER 24 VISTS. Used 1 2 3 4 5 6 7 8 9 10     PN: every 10th visit  Remaining  10 9 8 7 6 5 4 3 2 1          Subjective: Patient arrived on time and did not complain of pain. Patient is returning to speech following admittance to the hospital with stroke-like symptoms on 25. Patient reported experiencing dizziness, headache, and nausea with high blood-pressure. Patient received an MRI which \"shows no acute infarct\" per hospitalist report dated 5/10/25. Patient has been attending speech therapy services 2x weekly starting 3/13/25. Patient was re-evaluated to determine any cognitive-linguistic changes following hospital admittance. Patent attended well to re-evaluation tasks. Objective testing was completed - results in objective section.         Assessment: Patient continues to present with mild to moderate cognitive linguistic deficits characterized by predominant difficulties with immediate memory, language, and attention. The Repeatable Battery for the Assessment of Neuropsychological Status (RBANS) was administered to measure attention, language, visuospatial/constructional abilities, and immediate & delayed memory. Patient showed improvements in the following subtests: story memory, figure copy, coding, and list recall. Patient has further maintained the maximum score possible in the " following subtest: list recognition. Overall, patient has shown improvement in narrative and word recall, which targets immediate and delayed memory/working memory as well as detail recall. Expressive language was notable for mild word finding difficulty. Visual naming was intact but mild difficulty was displayed during generative naming tasks.  Generative naming involves rapid naming within specific semantic or phonemic boundaries. This task combines executive word finding and working memory. Generative naming is a means of assessing executive function within the setting of expressive language as the task combines naming, working memory, self monitoring and sustained attention. The RBANS assesses attention in two areas: sustained attention and alternating attention. Sustained attention refers to the ability to maintain continuous effort (in this case mental concentration) over time. Related functional activities involve ignoring a variety of distractions and inhibiting  attention shifts to unrelated activities. Alternating attention refers to the ability to switch between tasks - to stop one task to participate in another and then be able to return to the initial task. Paulo has shown improvements in alternating attention during structured speech tasks (e.g., sorting cards into piles while tracking target cards and naming an item in a predetermined category) as well as sustained attention for numbers in which Bill more or less maintained current skill level. While Paulo has made several gains with regards to his immediate and delayed memory, attention, word finding and executive function, he still maintains difficulty with lexical retrieval of higher-complexity words and executive function skills. Executive function relates to a set of mental processes that enable planning, focus, attention and memory in order to complete tasks. Working memory and mental flexibility are two key components of executive function. It is  recommended that Bill continue with OP speech therapy services 1-2x weekly for 3 months to continue carryover of gains made in therapy. Prognosis is favorable considering patient's motivation and familial support.     Plan:  Patient would benefit from outpatient skilled Speech Therapy services: Cognitive-linguistic therapy     Frequency: 1-2x weekly  Duration: 3 months     Intervention certification from: 5/12/2025  Intervention certification to: 8/12/2025    Objective:  The Repeatable Battery for the Assessment of Neuropsychological Status (RBANS) is a brief, individually-administered assessment which measures attention, language, visuospatial/constructional abilities, and immediate & delayed memory. The RBANS is intended for use with adolescents to adults, ages 12 to 89 years. The following results were obtained during the administration of the assessment.    Form: A    Cognitive Domain/Subtest: Index Score: Percentile Rank: Classification: IE Index Score: Status:   IMMEDIATE MEMORY 103 58%ile Average 109 DECLINE        1. List Learning (27/40)          2. Story Memory (21/24)           VISUOSPATIAL/  CONSTRUCTIONAL 100 50%ile Average 102 NO CHANGE        3. Figure Copy (19/20)          4. Line Orientation (16/20)           LANGUAGE 85 16%ile Low Average 85 NO CHANGE        5. Picture Naming (9/10)          6. Semantic Fluency (14/40)           ATTENTION 75 5%ile Borderline 79 DECLINE        7. Digit Span (11/16)          8. Coding (20/89)           DELAYED MEMORY 118 88%ile High Average 114 IMPROVEMENT        9. List Recall (9/10)          10. List Recognition (20/20)          11. Story Recall (11/12)          12. Figure Recall (18/20)           Sum of Index Scores:  481  489 DECLINE   Total Score:  94      Percentile: 34%ile      Classification: Average          “IE” indicates the scores from the initial evaluation (3/13/25). Form: A        Treatment:  Short-term goals:  1. Patient will complete attention  dependent tasks with less than 2 errors per activity or 90% accuracy. --PARTIALLY MET (GOAL PROGRESSING)    Paulo was tasked sorting cards into three groups (e.g., red, black, face) while simultaneously tracking 4 target cards (e.g., 4 and 7) and naming an item in a predetermined category (e.g., states) when either card was shown. Paulo made 0 attention errors. Patient appeared to benefit from using an association strategy (e.g., naming states that he traveled  to during a road trip). Patient was asked to memorize the list of eight states and not write the named items down, thus increasing task complexity.      2. Improve high level word finding to 80% accuracy --PARTIALLY MET (GOAL PROGRESSING)  Not addressed during today's session.          3. Complete executive function dependent tasks with 80% accuracy or higher. --PARTIALLY MET (GOAL PROGRESSING)  Not addressed this session.      Long-term goals:  -Improve cognitive linguistic function. --PARTIALLY MET (GOAL PROGRESSING)    Skilled Intervention Statement  Titrated clueing was used throughout the above mentioned activities. Titrated clueing is a skilled, hierarchical intervention which allows for the patient to arrive at a correct response which would otherwise be outside their current capacity. It is initiated by the therapist and is based on clinical analysis of patient's response to the activity presented by the therapist. Under this system, the activity is then adjusted slightly in difficulty in order to maximize performance, which, in turn, drives recovery or helps maintain stability. Titrated clueing allows for maximum neuro-cognitive stimulation while permitting the patient a meaningful measure of control over the task.     Other:Patient was provided with home exercises/ activies to target goals in plan of care. Clinician reviewed POC with patient; patient expressed agreement and understanding.  Recommendations:Continue with Plan of Care

## 2025-05-13 ENCOUNTER — RA CDI HCC (OUTPATIENT)
Dept: OTHER | Facility: HOSPITAL | Age: 40
End: 2025-05-13

## 2025-05-13 ENCOUNTER — APPOINTMENT (OUTPATIENT)
Facility: CLINIC | Age: 40
End: 2025-05-13
Attending: INTERNAL MEDICINE
Payer: MEDICARE

## 2025-05-13 ENCOUNTER — EVALUATION (OUTPATIENT)
Facility: CLINIC | Age: 40
End: 2025-05-13
Attending: INTERNAL MEDICINE
Payer: MEDICARE

## 2025-05-13 DIAGNOSIS — R26.89 IMBALANCE: Primary | ICD-10-CM

## 2025-05-13 DIAGNOSIS — R29.90 STROKE-LIKE SYMPTOM: ICD-10-CM

## 2025-05-13 PROCEDURE — 97530 THERAPEUTIC ACTIVITIES: CPT

## 2025-05-13 NOTE — PROGRESS NOTES
"                                                                   PT Re-Evaluation             POC expires Auth Status Total   Visits  Start date  Expiration date PT/OT + Visit Limit? Co-Insurance   25 Auth req after 24v NA NA NA BOMN Yes, $0 co-pay                                                                      Visit/Unit Tracking  AUTH Status:  Date               Auth req after 24v   used Used 1               Remaining  17                         Today's date: 2025  Patient name: Mateus Mckeon  : 1985  MRN: [id number]  Referring provider: Dania Sher DO  Dx:   Encounter Diagnosis     ICD-10-CM    1. Imbalance  R26.89       2. Stroke-like symptom  R29.90 Ambulatory referral to Physical Therapy                      Assessment  Assessment details: Patient is a 39 y.o. Male who is familiar to the balance center presents back following discharge last month due to recent hospitalization. Past medical history significant for ESRD on dialysis, and CVA's (most recent one in 2024). Today he presents with decline in lower extremity strength and power as per 5x STS and decreased gait speed per 10 meter walk test. His gait speed classifies him as a limited community ambulator and not yet meeting criteria to cross the street. All other outcome measures remained relatively similar indicating great maintenance of functional gains made in therapy prior to discharge. Patient remains at HIGH risk for falls as per APTA and [facility] cutoff scores for TUG, FGA, and mCTSIB. Patient is highly visually reliant to maintain static and dynamic balance as he displays significant difficulty completing 2nd and 4th conditions of mCTSIB. Discussed plan of 6 week \"tune up\" where sessions are focused on returning patient to function level prior to discharge which he was in good verbal understanding with. Patient will benefit from skilled OPPT services to address recent regressions from " hospitalization and improve independence.     Impairments: Abnormal coordination, Abnormal gait, Abnormal muscle tone, Abnormal or restricted ROM, Activity intolerance, Impaired balance, Impaired physical strength, Lacks appropriate HEP, Poor posture, Poor body mechanics, Pain with function, Safety issue, Weight-bearing intolerance, Abnormal movement, Difficulty understanding, Abnormal muscle firing  Understanding of Dx/Px/POC: Good  Prognosis: Good    Patient verbalized understanding of POC.    Please contact me if you have any questions or recommendations. Thank you for the referral and the opportunity to share in Mateus Mckeon's care.      Plan  Plan details: PT 2-3x/week   Patient would benefit from: Skilled PT  Planned modality interventions: Biofeedback, Cryotherapy, TENS, Thermotherapy  Planned therapy interventions: Abdominal trunk stabilization, ADL training, Balance, Balance/WB training, Breathing training, Body mechanics training, Coordination, Functional ROM exercises, Gait training, HEP, Joint Mobilization, Manual Therapy, Ma taping, Motor coordination training, Neuromuscular re-education, Patient education, Postural training, Strengthening, Stretching, Therapeutic activities, Therapeutic exercises, Therapeutic training, Transfer training, Activity modification, Work reintegration  Frequency: 2-3x/wk  Duration in weeks: 6 weeks  Plan of Care beginning date: 5/13/25  Plan of Care expiration date: 6-weeks 6/24/35  Treatment plan discussed with: Patient         Goals  Short Term Goals (4 weeks):    - Patient will improve TUG score by MDC of 2.9 to indicate improved functional mobility and improved safety  - Patient will be independent with simple HEP to indicate improved independence  - Patient will report 75% improvement in overall function and mobility  - Patient will be able to negotiate stairs reciprocally without UE support to indicate improve safety     Long Term Goals (6 weeks):  -  Patient will improve 5x STS score to </= score at discharge to indicate return to prior level of function  - Patient will report 90% improvement in mobility and tolerance to activity to indicate improved independence  - Patient will complete 3rd condition of mCTSIB to indicate improved balance strategies on compliant surface  - Patient will be independent with complex HEP to improve independence and indicate maintenance of functional gains.       Cut off score    All date taken from APTA Neuro Section or Rehab Measures      [place]:  Siva et al., 2018  MDC: 2.7 pts    She et al., 2011  Cut-off score: 45/56    Chronic CVA  < 44/56 high risk for falls (Siva et al., 2018)  < 47.5/56 slow walker status (Jus et al., 2011) 5xSTS: Jean Carlos 2010  MDC: 2.3 sec  Age Norms:  60-69: 11.1 sec  70-79: 12.6 sec  80-89: 14.8 sec    Yamileth 2010, Chronic Stroke  Chronic CVA: 12 sec   TUG  Yon., 2005  MDC: 2.9 sec    Cut off score:  >13.5 sec community dwelling adults  >32.2 frail elderly  <20 I for basic transfers  >30 dependent on transfers 10 Meter Walk Test:   Talia et al., 2006  Small meaningful change: 0.06 m/s  Substantial meaningful change: 0.14 m/s  MCID: 0.16 m/s    < 0.4 m/s household ambulators  0.4 - 0.8 m/s limited community ambulators  > 0.8 m/s community ambulators   FGA: Deepika et al., 2010  MCID: 4.2 pts  Geriatrics/community < 22/30 fall risk  Geriatrics/community < 20/30 unexplained falls DGI  MDC: vestibular - 4 pts  MDC: geriatric/community - 3 pts  Falls risk <19/24   6 Minute Walk Test  Talia et al., 2006  MDC: 60.98 m (200.01 ft)    Cuco, Verónica, & Ally, 2012  MCID: 34.4 m    Age Norms  60-69: M - 1876 ft   F - 1765 ft  70-79: M - 1729 ft   F - 1545 ft  80-89: M - 1368 ft   F - 1286 ft Modified Joel  0: No increase in tone  1: Catch and release or min resistance at end range  1+: Catch f/b min resistance throughout remainder (< half ROM)  2: Easily moved, but more marked tone  "throughout most ROM  3: Significant tone, PROM difficult  4: Rigid   MiniBest: Glenda et al., 2013  CVA < 17.5 fall risk Pass (Acute CVA)  MDC: 1.8 points (acute), 3.2 points (chronic)         Subjective    History of Present Illness  Mechanism of injury: Patient is familiar to balance center as he participated with OT and PT services before self-discharging from PT after 09/06/24 secondary to \"feeling ready and past the point of needing PT services\". Patient past medical history significant for CVA (3rd one in January), ESRD which he goes to dialysis for 3x a week, frequent falls, and COVID. During his hiatus from PT patient had 2 falls and COVID with subsequent ~5 day hospitalization on 10/18/24. He had a second hospital visit ~2 weeks ago where he was exhibiting seizure like activity. As per patient, medical professionals did not provide rationale for seizure like activity as imaging returned negative. He was not placed on any new medication and was discharged home. Follow-up with neurology in January; denies any new seizure like activity. He ambulates with his rollator while out in his community and cruises on furniture at home despite recommended use of cane or RW at home. Patient largest complaint is that he feels overtly fatigued as his tolerance to activity has vastly decreased, increased word finding difficulties, and decreased balance. His major goal is to improve tolerance and return to level he was prior to self discharge.     Updated (12/24/2024): Patient reports for reassessment after hiatus from PT services due to being sick with Covid. He feels his endurance has been worse since being sick.    Updated (1/16/25): Patient has been hospitalized twice within the past month (on 1/4 and 1/9) for hypertensive emergency, and also was diagnosed with the flu.He reports feeling weak since being discharged from hospital. He presents with SPC.     Updated (2/18/25): Pt reports BP's have been better, continues to " get dizzy but it's bearable and more of his baseline now. He has eliminated some of his BP pills. Arrives with rollator today; contines to alternate between cane and rollator depending how he feels.     Updated 4/3/25: Pt reports dizziness is stable. Has been busy with dr christiansen in [facility], so hasn't been to PT for past 2 weeks. Having a dog has really helped him mentally, and physically because he is helping to care for the dog.    Updated 5/13/25: Patient presents back to PT following ~3 day hospital stay secondary to high blood pressure with reported readings of SBP in the 200s. He states he had a headache and was nauseas. Imaging did not reveal any acute abnormalities as per patient he was told it was a hypertensive event. Since being discharged home he has noticed decrease in endurance and balance. He continues to attend dialysis every M,W, and F at 11 am. Denies any falls. He will be undergoing cataracts surgery late July.    Primary AD: currently SPC and rollator depending how he is feeling.   (PLOF he mostly used SPC and only used rollator for long distances like going to nephew's soccer game)   Assist level at home: lives with parents who are assisting with ADL's and IADL's, but he is transferring and walking independently.   WC usage: none  PLOF: using SPC mostly, still required some assist from parents for ADL's and IADL's   Patient goals: to walk with SPC again, work on balance, build up walking endurance     Pain  Pain in both feet due to neuropathy     Social Support  Steps to enter house: 2 NIRU  Stairs in house: full flight to 2nd floor (able to perform independently)    Lives in: 2 story home   Lives with: parents, and new puppy!    Employment status: disabled   Hand dominance: right    Treatments  Previous treatment: PT, OT  Current treatment: PT, OT  Diagnostic Testing:       Objective   LE MMT   - R Hip Flexion: 4/5  - L Hip Flexion: 4+/5  - R Hip Extension: 4+/5  - L Hip Extension: 4+/5  - R Hip  Abduction: 5/5  - L Hip abduction: 5/5  - R Hip adduction: 5/5  - L Hip adduction: 5/5  - R Knee Extension: 4+/5 - L Knee Extension: 4+/5  - R Knee Flexion: 4+/5  - L Knee Flexion: 4+/5  - R Ankle DF: 4/5  - L Ankle DF: 4/5  - R Ankle PF: 4+/5  - L Ankle PF: 4+/5    LE MMT Updated 5/13/25  - R Hip Flexion: 4/5  - L Hip Flexion: 4+/5  - R Hip Extension: 4+/5  - L Hip Extension: 4+/5  - R Hip Abduction: 5/5  - L Hip abduction: 5/5  - R Hip adduction: 5/5  - L Hip adduction: 5/5  - R Knee Extension: 4+/5 - L Knee Extension: 4+/5  - R Knee Flexion: 4+/5  - L Knee Flexion: 4+/5  - R Ankle DF: 4/5  - L Ankle DF: 4/5  - R Ankle PF: 4+/5  - L Ankle PF: 4+/5    Sensation  - Light touch: neuropathy both feet up to below knee    Coordination  - Alternating Toe Taps: impaired  - Heel to shin: impaired   - Dysmetria: R side undershooting   - Dysdiadochokinesia: R slowed     Neglect  - R sided: No  - L sided: No    Gait  Wide COLBY, ataxic, decreased step length    BP: 148/92 mmHg  HR: 87 bpm  SpO2: 97%      Outcome Measures Initial Eval  1/30/24 PN/DC  9/4/2024 Initial Evaluation  11/18/24 PN  12/24/2024 Re-eval  1/16/2025 Re-eval  2/18/2025 Re-eval  4/3/2025 RE  5/13/25   5xSTS 31.25 sec 13.67 sec   no UE defer 16.33 sec,   no UE 21.03 sec, no UE  13.07 sec, no UE 16.44 sec, no UE 18.00 sec, no UE   TUG 16.3 sec with rollator    23.6 sec no AD 11.48 sec   no AD 18.91 sec w/ rollator     17.16 sec no AD 12.18 sec   no AD 15.72 sec, no AD 10.87 sec, no AD 13.02 sec, no AD 12.00 sec, no AD   10 meter  1.11 m/s self selected pace 0.84 m/s self selected pace  1.05 m/s self selected pace 0.74 m/s 1.14 m/s  0.67 m/s w/ SPC MEDRANO  49/56 32/56 40/56 40/56 43/56     FGA 12/30 15/30 defer 12/30 11/30 16/30 16/30   6MWT 900 ft 955 ft no AD defer 825 ft no  ft, no  ft, no AD 1,040 ft, no AD defer   mCTSIB  FTEO firm  FTEC firm  FTEO foam  FTEC foam   30 sec  30 sec  30 sec  Unable defer 30 sec  30 sec  30 sec  2 sec 30 sec  30  sec  16 sec  2 sec 30 sec  30 sec  17 sec  0 sec 30 sec  30 sec  8 sec  0 sec 30 sec  30 sec  15 sec  0 sec   ABC  66.88% 62.5% 57.5% 49.38% 60.63% 62.5% defer        Precautions: Check BP's RUE only (fistula LUE), frequent falls  Past Medical History:   Diagnosis Date    Acute kidney injury (HCC)     Ambulates with cane     Anuria     Anxiety     Cellulitis of right elbow 03/31/2021    Chronic kidney disease     COVID-19 10/18/2024    Depression     Diabetes mellitus (HCC)     Diarrhea     Emesis 10/24/2020    End stage renal disease (HCC) 02/11/2018    Formatting of this note might be different from the original. Last Assessment & Plan:  Secondary to DM.  On nightly PD.  Followed by [facility]ph[facility].  Patient considering transplant for kidney and pancreas through [facility] Formatting of this note might be different from the original. Last Assessment & Plan:  Formatting of this note might be different from the original. 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    Esophagitis 07/21/2015    Falls     Gastroparesis     GERD (gastroesophageal reflux disease)     History of shingles 2010    History of transfusion 02/2018    no adverse reaction    Hyperlipidemia     Hyperphosphatemia     Hypertension     Hypoglycemia 07/15/2022    Itching     Mastoiditis of right side 07/15/2022    Muscle weakness     general unsteadiness    Obesity (BMI 30.0-34.9) 09/09/2019    Orthostatic hypotension 10/25/2020    Peripheral polyneuropathy 11/20/2019    PONV (postoperative nausea and vomiting) 01/26/2018    Protein-calorie malnutrition (HCC) 11/23/2020    Recurrent peritonitis (HCC) due to peritoneal dialysis catheter 07/31/2020    Retinopathy     Seizures (HCC)     early 2020 - one time    Skin abnormality     some dime size areas where skin was scratched from itching    Sleep apnea     Spontaneous bacterial peritonitis (HCC) 10/19/2020    Squamous cell skin cancer     left temple    Stroke (HCC)     x2 - off  balance/no driving/fatigue    Swelling of both lower extremities     Swelling of joint of upper arm, right 04/03/2024    Traumatic onycholysis 07/21/2022    Vomiting     Wears glasses     Word finding difficulty 11/05/2024

## 2025-05-14 ENCOUNTER — OFFICE VISIT (OUTPATIENT)
Dept: SPEECH THERAPY | Facility: CLINIC | Age: 40
End: 2025-05-14
Attending: INTERNAL MEDICINE
Payer: MEDICARE

## 2025-05-14 DIAGNOSIS — I63.9 CEREBROVASCULAR ACCIDENT (CVA), UNSPECIFIED MECHANISM (HCC): ICD-10-CM

## 2025-05-14 DIAGNOSIS — R41.841 COGNITIVE COMMUNICATION DEFICIT: Primary | ICD-10-CM

## 2025-05-14 PROCEDURE — 92507 TX SP LANG VOICE COMM INDIV: CPT

## 2025-05-14 NOTE — PROGRESS NOTES
Speech Treatment Note    Today's date: 2025  Patient name: Mateus Mckeon  : 1985  MRN: [id number]  Referring provider: Dania Sher DO  Dx:   Encounter Diagnosis     ICD-10-CM    1. Cognitive communication deficit  R41.841       2. Cerebrovascular accident (CVA), unspecified mechanism (HCC)  I63.9                              POC expires Auth Status Total   Visits  Start date  Expiration date PT/OT + Visit Limit? Co-Insurance   25 Medicare  BOMN 3/13/25 N/A No  Yes                                             Visit/Unit Tracking  AUTH Status: BOMN Date              Visits  Authed: AFTER 24 VISTS. Used 1 2 3 4 5 6 7 8 9 10     PN: every 10th visit  Remaining  10 9 8 7 6 5 4 3 2 1            Subjective/Behavioral: Patient arrived on time and attended to all session activities. Patient appeared to be doing well and did not complain of pain. Patient completed objective testing in previous session. Results were reviewed with patient; plan of care was discussed. Patient verbalized agreement and expressed understanding.     Short-term goals:  1. Patient will complete attention dependent tasks with less than 2 errors per activity or 90% accuracy.   Alternating Attention/Alphabet: Bill was tasked with naming a musical artist whose first name corresponded to the letter of the alphabet (e.g., A --Rishi Quintero, B -- Kurtis Garcia). Patient completed task with 0 attention errors, accurately maintaining the correct letter during task. Despite keeping track of the letters of the alphabet, patient demonstrated significantly increased processing time during word retrieval part of activity. See goal two for more information.      2. Improve high level word finding to 80% accuracy  Paulo accurately named a musical artist/band with constraints remarked upon in goal one (naming artist following letters of alphabet) with 73% accuracy (I). Patient reported naming artists of high familiarity or artists  resonated personally assisted in accurate recall. Patient demonstrated increased processing time. Accuracy improved to 90% accuracy with min-mod clinician cues in the form of semantic cues.     3. Complete executive function dependent tasks with 80% accuracy or higher.   Not addressed this session.          Long-term goals:  -Improve cognitive linguistic function.     Skilled Intervention Statement  Titrated clueing was used throughout the above mentioned activities. Titrated clueing is a skilled, hierarchical intervention which allows for the patient to arrive at a correct response which would otherwise be outside their current capacity. It is initiated by the therapist and is based on clinical analysis of patient's response to the activity presented by the therapist. Under this system, the activity is then adjusted slightly in difficulty in order to maximize performance, which, in turn, drives recovery or helps maintain stability. Titrated clueing allows for maximum neuro-cognitive stimulation while permitting the patient a meaningful measure of control over the task.     Other:Patient was provided with home exercises/ activies to target goals in plan of care.   Recommendations:Continue with Plan of Care

## 2025-05-15 ENCOUNTER — OFFICE VISIT (OUTPATIENT)
Facility: CLINIC | Age: 40
End: 2025-05-15
Attending: INTERNAL MEDICINE
Payer: MEDICARE

## 2025-05-15 ENCOUNTER — APPOINTMENT (OUTPATIENT)
Facility: CLINIC | Age: 40
End: 2025-05-15
Payer: MEDICARE

## 2025-05-15 DIAGNOSIS — R29.90 STROKE-LIKE SYMPTOM: ICD-10-CM

## 2025-05-15 DIAGNOSIS — R26.89 IMBALANCE: Primary | ICD-10-CM

## 2025-05-15 PROCEDURE — 97112 NEUROMUSCULAR REEDUCATION: CPT

## 2025-05-15 NOTE — PROGRESS NOTES
"Daily Note   POC expires Auth Status Total   Visits  Start date  Expiration date PT/OT + Visit Limit? Co-Insurance   25 Auth req after 24v NA NA NA BOMN Yes, $0 co-pay                                                                      Visit/Unit Tracking  AUTH Status:  Date 5/13 5/15             Auth req after 24v   used Used 1 1              Remaining  17 16               Today's date: 5/15/2025  Patient name: Mateus Mckeon  : 1985  MRN: [id number]  Referring provider: Dania Sher DO  Dx:   Encounter Diagnosis     ICD-10-CM    1. Imbalance  R26.89       2. Stroke-like symptom  R29.90                      Subjective: Patient presents to PT stating his systolic blood pressure was in the 200s during dialysis yesterday but seemingly returned to baseline when he checked it this morning.       Objective: See treatment diary below    NMR/TA: (gait belt, CG/CS for all activities)  BP: 156/70 mmHg  (1-2 minute seated rest break before ea exercise)  - Step-ups to 8\" stair, 0UE: 15x, 2 sets  - Sidestepping>monster walks w/ GTB: 15ft, 2 laps each  - Standing GTB resisted marching with counter support: 1 min  - Open the ylles over 9\" haydee: 10x    Assessment: Patient tolerated treatment well with emphasis on initiating exercise program centered on strengthening and endurance. Included timed seated rest periods to avoid over-fatiguing patient and minimize light headedness symptoms. Patient displays good single leg balance this session with improved motor control during haydee negotiation. Plan to continue to progress as tolerated. Patient would benefit from continued PT to maximize function and reduce risk for falls.       Plan: Continue per plan of care.           Outcome Measures Initial Eval  24 PN/DC  2024 Initial Evaluation  24 PN  2024 Re-eval  2025 Re-eval  2025 Re-eval  4/3/2025 RE  25   5xSTS 31.25 sec 13.67 sec   no UE defer 16.33 sec,   no UE 21.03 " sec, no UE  13.07 sec, no UE 16.44 sec, no UE 18.00 sec, no UE   TUG 16.3 sec with rollator    23.6 sec no AD 11.48 sec   no AD 18.91 sec w/ rollator     17.16 sec no AD 12.18 sec   no AD 15.72 sec, no AD 10.87 sec, no AD 13.02 sec, no AD 12.00 sec, no AD   10 meter  1.11 m/s self selected pace 0.84 m/s self selected pace  1.05 m/s self selected pace 0.74 m/s 1.14 m/s  0.67 m/s w/ SPC   MEDRANO  49/56 32/56 40/56 40/56 43/56     FGA 12/30 15/30 defer 12/30 11/30 16/30  16/30   6MWT 900 ft 955 ft no AD defer 825 ft no  ft, no  ft, no AD 1,040 ft, no AD defer   mCTSIB  FTEO firm  FTEC firm  FTEO foam  FTEC foam   30 sec  30 sec  30 sec  Unable defer 30 sec  30 sec  30 sec  2 sec 30 sec  30 sec  16 sec  2 sec 30 sec  30 sec  17 sec  0 sec 30 sec  30 sec  8 sec  0 sec 30 sec  30 sec  15 sec  0 sec   ABC  66.88% 62.5% 57.5% 49.38% 60.63% 62.5% defer

## 2025-05-19 ENCOUNTER — OFFICE VISIT (OUTPATIENT)
Dept: SPEECH THERAPY | Facility: CLINIC | Age: 40
End: 2025-05-19
Attending: INTERNAL MEDICINE
Payer: MEDICARE

## 2025-05-19 DIAGNOSIS — R41.841 COGNITIVE COMMUNICATION DEFICIT: Primary | ICD-10-CM

## 2025-05-19 DIAGNOSIS — I63.9 CEREBROVASCULAR ACCIDENT (CVA), UNSPECIFIED MECHANISM (HCC): ICD-10-CM

## 2025-05-19 PROCEDURE — 92507 TX SP LANG VOICE COMM INDIV: CPT

## 2025-05-19 NOTE — PROGRESS NOTES
"Speech Treatment Note    Today's date: 2025  Patient name: Mateus Mckeon  : 1985  MRN: [id number]  Referring provider: Dania Sher DO  Dx:   Encounter Diagnosis     ICD-10-CM    1. Cognitive communication deficit  R41.841       2. Cerebrovascular accident (CVA), unspecified mechanism (HCC)  I63.9                     Start Time: 30  Stop Time: 1015  Total time in clinic (min): 45 minutes    POC expires Auth Status Total   Visits  Start date  Expiration date PT/OT + Visit Limit? Co-Insurance   25 Medicare  BOMN 3/13/25 N/A No  Yes                                             Visit/Unit Tracking  AUTH Status: BOMN Date             Visits  Authed: AFTER 24 VISTS. Used 1 2 3 4 5 6 7 8 9 10     PN: every 10th visit  Remaining  10 9 8 7 6 5 4 3 2 1            Subjective/Behavioral: Patient arrived on time and attended to all session activities. Patient appeared to be doing well and did not complain of pain. Patient reported feeling more \"unsteady\" while walking since being admitted to the hospital. Clinician will continue to monitor.    Short-term goals:  1. Patient will complete attention dependent tasks with less than 2 errors per activity or 90% accuracy.   Alternating Attention/Alphabet: Paulo was tasked with naming a song titles with a first name corresponding to the letter of the alphabet (e.g., All of me --A, B -- Blowin' in the wind). Patient completed task with 2 attention errors, accurately maintaining the correct letter during task. Despite keeping track of the letters of the alphabet, patient demonstrated significantly increased processing time during word retrieval part of activity. See goal two for more information.      2. Improve high level word finding to 80% accuracy  Paulo accurately named a song title with constraints remarked upon in goal one (naming songs following letters of alphabet) with 76% accuracy (I). Patient reported naming songs of high familiarity " or songs that resonated personally assisted in accurate recall. Patient further mentioned that choosing a word that started with relevant letter aided in word recall. Patient demonstrated increased processing time. Accuracy improved to 88% accuracy with min-mod clinician cues in the form of semantic and phonemic cues.     3. Complete executive function dependent tasks with 80% accuracy or higher.   Not addressed this session.          Long-term goals:  -Improve cognitive linguistic function.     Skilled Intervention Statement  Titrated clueing was used throughout the above mentioned activities. Titrated clueing is a skilled, hierarchical intervention which allows for the patient to arrive at a correct response which would otherwise be outside their current capacity. It is initiated by the therapist and is based on clinical analysis of patient's response to the activity presented by the therapist. Under this system, the activity is then adjusted slightly in difficulty in order to maximize performance, which, in turn, drives recovery or helps maintain stability. Titrated clueing allows for maximum neuro-cognitive stimulation while permitting the patient a meaningful measure of control over the task.     Other:Patient was provided with home exercises/ activies to target goals in plan of care.   Recommendations:Continue with Plan of Care

## 2025-05-20 ENCOUNTER — APPOINTMENT (OUTPATIENT)
Facility: CLINIC | Age: 40
End: 2025-05-20
Payer: MEDICARE

## 2025-05-20 ENCOUNTER — OFFICE VISIT (OUTPATIENT)
Age: 40
End: 2025-05-20
Payer: MEDICARE

## 2025-05-20 ENCOUNTER — OFFICE VISIT (OUTPATIENT)
Facility: CLINIC | Age: 40
End: 2025-05-20
Attending: INTERNAL MEDICINE
Payer: MEDICARE

## 2025-05-20 ENCOUNTER — TELEPHONE (OUTPATIENT)
Dept: SPEECH THERAPY | Facility: CLINIC | Age: 40
End: 2025-05-20

## 2025-05-20 VITALS
DIASTOLIC BLOOD PRESSURE: 80 MMHG | WEIGHT: 195 LBS | HEIGHT: 71 IN | SYSTOLIC BLOOD PRESSURE: 140 MMHG | RESPIRATION RATE: 16 BRPM | BODY MASS INDEX: 27.3 KG/M2 | OXYGEN SATURATION: 99 % | TEMPERATURE: 97.9 F | HEART RATE: 79 BPM

## 2025-05-20 DIAGNOSIS — Z99.2 ESRD (END STAGE RENAL DISEASE) ON DIALYSIS (HCC): ICD-10-CM

## 2025-05-20 DIAGNOSIS — Z99.2 ESRD ON HEMODIALYSIS (HCC): Chronic | ICD-10-CM

## 2025-05-20 DIAGNOSIS — N18.6 ESRD (END STAGE RENAL DISEASE) ON DIALYSIS (HCC): ICD-10-CM

## 2025-05-20 DIAGNOSIS — R29.90 STROKE-LIKE SYMPTOM: ICD-10-CM

## 2025-05-20 DIAGNOSIS — I16.0 HYPERTENSIVE URGENCY: Primary | ICD-10-CM

## 2025-05-20 DIAGNOSIS — R26.89 IMBALANCE: Primary | ICD-10-CM

## 2025-05-20 DIAGNOSIS — N18.6 ESRD ON HEMODIALYSIS (HCC): Chronic | ICD-10-CM

## 2025-05-20 DIAGNOSIS — R29.90 STROKE-LIKE SYMPTOMS: ICD-10-CM

## 2025-05-20 DIAGNOSIS — Z86.73 HISTORY OF CVA (CEREBROVASCULAR ACCIDENT): ICD-10-CM

## 2025-05-20 PROBLEM — I48.0 PAROXYSMAL ATRIAL FIBRILLATION (HCC): Status: RESOLVED | Noted: 2024-12-26 | Resolved: 2025-05-20

## 2025-05-20 PROCEDURE — 99495 TRANSJ CARE MGMT MOD F2F 14D: CPT | Performed by: INTERNAL MEDICINE

## 2025-05-20 PROCEDURE — 97112 NEUROMUSCULAR REEDUCATION: CPT

## 2025-05-20 NOTE — TELEPHONE ENCOUNTER
Clinician called pt to follow-up regarding virtual visits. Pt will attend virtual visit on Wednesday 5/21/25. A telehealth appointment was created via [employer] per updated administrative requirements. Follow up email was sent to patient for confirmation.

## 2025-05-20 NOTE — ASSESSMENT & PLAN NOTE
Lab Results   Component Value Date    EGFR 7 05/10/2025    EGFR 7 05/08/2025    EGFR 6 01/17/2025    CREATININE 7.66 (H) 05/10/2025    CREATININE 8.49 (H) 05/08/2025    CREATININE 9.42 (H) 01/17/2025

## 2025-05-20 NOTE — ASSESSMENT & PLAN NOTE
-presented 5/8 with dizziness, HA, nausea and elevated bp with prior h/o CVA  -CTH, MRI brain neg.  CTA showed stable moderate stenosis of L cavernous and supraclinoid internal carotidy artery  -suspected to be secondary to hypertensive urgency  -pt to return to PT for dizziness and gait abnormality

## 2025-05-20 NOTE — ASSESSMENT & PLAN NOTE
-per chart review, /114 on admission associated with dizziness, HA and nausea  -imaging ruled out acute CVA.  Has prior history  -patient continued on same home meds with recommendation to possibly switch clonidine to patch to prevent lability of bp, defer to Nephro

## 2025-05-20 NOTE — PROGRESS NOTES
Speech Treatment Note    Today's date: 2025  Patient name: Mateus Mckeon  : 1985  MRN: [id number]  Referring provider: Dania Sher DO  Dx:   Encounter Diagnosis     ICD-10-CM    1. Cognitive communication deficit  R41.841       2. Cerebrovascular accident (CVA), unspecified mechanism (HCC)  I63.9                       Start Time: 0940  Stop Time: 1015  Total time in clinic (min): 35 minutes    POC expires Auth Status Total   Visits  Start date  Expiration date PT/OT + Visit Limit? Co-Insurance   25 Medicare  BOMN 3/13/25 N/A No  Yes                                             Visit/Unit Tracking  AUTH Status: BOMN Date            Visits  Authed: AFTER 24 VISTS. Used 1 2 3 4 5 6 7 8 9 10     PN: every 10th visit  Remaining  10 9 8 7 6 5 4 3 2 1          Telemedicine consent    Patient: Mateus Mckeon  Provider: Kimmy Hung, SLP  Provider located at [facility]  PHYSICAL THERAPY AT [address] [address]    After connecting through [facility], the patient was identified by name and date of birth. Mateus Mckeon was informed that this is a telemedicine visit which may not be secure and therefore, might not be HIPAA-compliant.  My office door was closed. No one else was in the room.  He acknowledged consent and understanding of privacy and security of the platform. The patient has agreed to participate and understands they can discontinue the visit at any time. Patient is aware this is a billable service.      Subjective/Behavioral: Patient entered teletherapy appointment on time and attended to all session activities. Patient appeared to be doing well and did not complain of pain.     Short-term goals:  1. Patient will complete attention dependent tasks with less than 2 errors per activity or 90% accuracy.   Alternating Attention/Alphabet: Paulo was tasked with naming famous people with a first name  corresponding to the letter of the alphabet (e.g., Denny Knox --A, B -- Kurtis francois). Patient completed task with 0 attention errors, accurately maintaining the correct letter during task. Despite keeping track of the letters of the alphabet, patient demonstrated significantly increased processing time during word retrieval part of activity. Same activity was repeated but with patient naming movie title, increasing complexity. While patient displayed 0 attention errors, recall of titles appeared difficult. See goal two for more information.      2. Improve high level word finding to 80% accuracy  Bill accurately named famous people with constraints remarked upon in goal one (naming famous people following letters of alphabet) with 100% accuracy (I). Patient reported naming famous people of high familiarity assisted in accurate recall. Patient further mentioned that choosing a word that started with relevant letter aided in word recall. Patient demonstrated increased processing time. In second iteration of this activity, patience achieved 82% accuracy in naming movie titles. Patient demonstrated increased processing time. Patient completed up to letter 17 letters.    3. Complete executive function dependent tasks with 80% accuracy or higher.   Not addressed this session.          Long-term goals:  -Improve cognitive linguistic function.     Skilled Intervention Statement  Titrated clueing was used throughout the above mentioned activities. Titrated clueing is a skilled, hierarchical intervention which allows for the patient to arrive at a correct response which would otherwise be outside their current capacity. It is initiated by the therapist and is based on clinical analysis of patient's response to the activity presented by the therapist. Under this system, the activity is then adjusted slightly in difficulty in order to maximize performance, which, in turn, drives recovery or helps maintain stability.  Titrated clueing allows for maximum neuro-cognitive stimulation while permitting the patient a meaningful measure of control over the task.     Other:Patient was provided with home exercises/ activies to target goals in plan of care.   Recommendations:Continue with Plan of Care

## 2025-05-20 NOTE — PROGRESS NOTES
"Daily Note   POC expires Auth Status Total   Visits  Start date  Expiration date PT/OT + Visit Limit? Co-Insurance   25 Auth req after 24v NA NA NA BOMN Yes, $0 co-pay                                                                      Visit/Unit Tracking  AUTH Status:  Date 5/13 5/15 5/20            Auth req after 24v   used Used 1 1              Remaining  17 16 15                Today's date: 2025  Patient name: Mateus Mckeon  : 1985  MRN: [id number]  Referring provider: Dania Sher DO  Dx:   Encounter Diagnosis     ICD-10-CM    1. Imbalance  R26.89       2. Stroke-like symptom  R29.90             Start Time: 1420  Stop Time: 1500  Total time in clinic (min): 40 minutes    Subjective: Patient presents to PT without new complaints     Objective: See treatment diary below    NMR/TA: (**gait belt, CG/CS for all activities**)  BP: 120/70 mmHg R UE seated on arrival    (1-2 minute seated rest break before ea exercise)  - Step-ups to 8\" stair, 0UE: 15x, 2 sets  - Sidestepping>monster walks w/ GTB around distal thighs: 15ft x 4 laps   - Standing GTB (loops on forefeet) resisted marching with counter support: 30 reps  - posteriorly resisted for/rev gait along counter but no UE support used   - Open the lyles over 9\" haydee: 10x    Assessment: Patient tolerated treatment well with emphasis on initiating exercise program centered on strengthening and endurance. Included timed seated rest periods to avoid over-fatiguing patient and minimize light headedness symptoms. Patient displays good single leg balance this session with improved motor control during haydee negotiation.  Patient required UE support for resisted hip flexion in standing. Plan to continue to progress as tolerated. Patient would benefit from continued PT to maximize function and reduce risk for falls.       Plan: Continue per plan of care.           Outcome Measures Initial Eval  24 PN/DC  2024 Initial " Evaluation  11/18/24 PN  12/24/2024 Re-eval  1/16/2025 Re-eval  2/18/2025 Re-eval  4/3/2025 RE  5/13/25   5xSTS 31.25 sec 13.67 sec   no UE defer 16.33 sec,   no UE 21.03 sec, no UE  13.07 sec, no UE 16.44 sec, no UE 18.00 sec, no UE   TUG 16.3 sec with rollator    23.6 sec no AD 11.48 sec   no AD 18.91 sec w/ rollator     17.16 sec no AD 12.18 sec   no AD 15.72 sec, no AD 10.87 sec, no AD 13.02 sec, no AD 12.00 sec, no AD   10 meter  1.11 m/s self selected pace 0.84 m/s self selected pace  1.05 m/s self selected pace 0.74 m/s 1.14 m/s  0.67 m/s w/ SPC   MEDRANO  49/56 32/56 40/56 40/56 43/56     FGA 12/30 15/30 defer 12/30 11/30 16/30 16/30   6MWT 900 ft 955 ft no AD defer 825 ft no  ft, no  ft, no AD 1,040 ft, no AD defer   mCTSIB  FTEO firm  FTEC firm  FTEO foam  FTEC foam   30 sec  30 sec  30 sec  Unable defer 30 sec  30 sec  30 sec  2 sec 30 sec  30 sec  16 sec  2 sec 30 sec  30 sec  17 sec  0 sec 30 sec  30 sec  8 sec  0 sec 30 sec  30 sec  15 sec  0 sec   ABC  66.88% 62.5% 57.5% 49.38% 60.63% 62.5% defer

## 2025-05-20 NOTE — PROGRESS NOTES
Transition of Care Visit:  Name: Mateus Mckeon      : 1985      MRN: [id number]  Encounter Provider: Dania Sher DO  Encounter Date: 2025   Encounter department: [facility] INTERNAL MEDICINE    Assessment & Plan  Hypertensive urgency  -per chart review, /114 on admission associated with dizziness, HA and nausea  -imaging ruled out acute CVA.  Has prior history  -patient continued on same home meds with recommendation to possibly switch clonidine to patch to prevent lability of bp, defer to Nephro       Stroke-like symptoms  -presented  with dizziness, HA, nausea and elevated bp with prior h/o CVA  -CTH, MRI brain neg.  CTA showed stable moderate stenosis of L cavernous and supraclinoid internal carotidy artery  -suspected to be secondary to hypertensive urgency  -pt to return to PT for dizziness and gait abnormality         History of CVA (cerebrovascular accident)  -s/p loop recorder, neg for PAF  -continue ASA and atorvastatin       ESRD (end stage renal disease) on dialysis ([facility])  Lab Results   Component Value Date    EGFR 7 05/10/2025    EGFR 7 2025    EGFR 6 2025    CREATININE 7.66 (H) 05/10/2025    CREATININE 8.49 (H) 2025    CREATININE 9.42 (H) 2025            ESRD on hemodialysis ([facility])  Lab Results   Component Value Date    EGFR 7 05/10/2025    EGFR 7 2025    EGFR 6 2025    CREATININE 7.66 (H) 05/10/2025    CREATININE 8.49 (H) 2025    CREATININE 9.42 (H) 2025                 History of Present Illness     Transitional Care Management Review:   Mateus Mckeon is a 39 y.o. male here for TCM follow up.     During the TCM phone call patient stated:  TCM Call (since 2025)     Date and time call was made  2025 10:38 AM    Patient was hospitialized at  [facility]    Date of Admission  25    Date of discharge  05/10/25    Diagnosis  Stroke-like symptoms    Disposition  Home      TCM Call (since  "5/6/2025)     Scheduled for follow up?  Yes    I have advised the patient to call PCP with any new or worsening symptoms  Ruddy Foley M.A        HPI    He was hospitalized at [facility] 5/8-5/10/25 for HA, nausea, dizziness with SBP in 200s that would not come down despite home bp meds.  Acute CVA ruled out.  Has known stable moderate stenosis of L cavernous and supraclinoid internal carotid artery.    Home BP have been \"rather good\" around 130-150/90s.  Gets HA intermittently though not same intensity.  Feels generalized weakness and restarted PT.  Feels nervous around stairs.      Review of Systems   Constitutional:  Positive for appetite change. Negative for activity change.   Respiratory:  Negative for cough, shortness of breath and wheezing.    Cardiovascular:  Negative for chest pain, palpitations and leg swelling.   Musculoskeletal:  Positive for gait problem.   Neurological:  Positive for dizziness, weakness (generalized weakness) and numbness.   Psychiatric/Behavioral:  The patient is nervous/anxious.      Current Medications[1]    Objective   /80 (BP Location: Right arm, Patient Position: Sitting, Cuff Size: Standard)   Pulse 79   Temp 97.9 °F (36.6 °C) (Tympanic)   Resp 16   Ht 5' 11\" (1.803 m)   Wt 88.5 kg (195 lb)   SpO2 99%   BMI 27.20 kg/m²     Physical Exam  Vitals reviewed.   Constitutional:       General: He is not in acute distress.  HENT:      Mouth/Throat:      Mouth: Mucous membranes are moist.     Cardiovascular:      Rate and Rhythm: Normal rate and regular rhythm.      Pulses: Normal pulses.      Heart sounds: Normal heart sounds.      Comments: VADIM VIERA  Pulmonary:      Effort: Pulmonary effort is normal. No respiratory distress.      Breath sounds: Normal breath sounds. No wheezing.     Musculoskeletal:      Right lower leg: No edema.      Left lower leg: No edema.      Comments: Ambulates with a cane     Skin:     Coloration: Skin is not pale.     Neurological:     "  Mental Status: He is alert and oriented to person, place, and time.      Motor: No weakness.      Gait: Gait abnormal.     Psychiatric:         Mood and Affect: Mood normal.         Speech: Speech normal.         Medications have been reviewed by provider in current encounter             [1]    Current Outpatient Medications:   •  aspirin (ECOTRIN LOW STRENGTH) 81 mg EC tablet, Take 81 mg by mouth in the morning., Disp: , Rfl:   •  atorvastatin (LIPITOR) 40 mg tablet, TAKE 1 TABLET BY MOUTH ONCE DAILY IN THE EVENING, Disp: 90 tablet, Rfl: 1  •  b complex vitamins capsule, Take 1 capsule by mouth daily before lunch, Disp: , Rfl:   •  calcium acetate (PHOSLO) capsule, Take 1 capsule (667 mg total) by mouth 3 (three) times a day, Disp: 90 capsule, Rfl: 0  •  cinacalcet (SENSIPAR) 60 MG tablet, Take 1 tablet (60 mg total) by mouth daily, Disp: 30 tablet, Rfl: 0  •  cloNIDine (CATAPRES) 0.2 mg tablet, Take 1 tablet (0.2 mg total) by mouth every 8 (eight) hours, Disp: 90 tablet, Rfl: 0  •  escitalopram (LEXAPRO) 20 mg tablet, Take 1 tablet (20 mg total) by mouth daily, Disp: 90 tablet, Rfl: 1  •  gabapentin (NEURONTIN) 100 mg capsule, TAKE 1 CAPSULE BY MOUTH IN THE MORNING AND 2 CAPSULES AT BEDTIME, Disp: 90 capsule, Rfl: 5  •  GLUCAGON EMERGENCY 1 MG injection, , Disp: , Rfl: 3  •  Gvoke HypoPen 1-Pack 1 MG/0.2ML SOAJ, as needed, Disp: , Rfl:   •  hydrOXYzine HCL (ATARAX) 10 mg tablet, Take 1 tablet (10 mg total) by mouth every 6 (six) hours as needed for itching or anxiety, Disp: 30 tablet, Rfl: 3  •  Insulin Disposable Pump (Omnipod 5 G6 Intro, Gen 5,) KIT, , Disp: , Rfl:   •  Insulin Disposable Pump (Omnipod 5 G6 Pod, Gen 5,) MISC, , Disp: , Rfl:   •  labetalol (NORMODYNE) 200 mg tablet, Take 2 tablets (400 mg total) by mouth 3 (three) times a day (Patient taking differently: Take 200 mg by mouth Take 200 mg at 2 pm, 400 mg at 5 pm and 400 mg at 10 pm), Disp: 180 tablet, Rfl: 2  •  NIFEdipine (PROCARDIA XL) 90 mg 24  hr tablet, Take 1 tablet (90 mg total) by mouth 2 (two) times a day, Disp: 60 tablet, Rfl: 0  •  NovoLOG 100 UNIT/ML injection, , Disp: , Rfl:   •  olmesartan (BENICAR) 40 mg tablet, Take 40 mg by mouth in the morning., Disp: , Rfl:   •  ondansetron (ZOFRAN) 4 mg tablet, Take 1 tablet (4 mg total) by mouth every 8 (eight) hours as needed for nausea or vomiting, Disp: 90 tablet, Rfl: 1  •  torsemide (DEMADEX) 100 mg tablet, Take 1 tablet (100 mg total) by mouth daily Do not start before January 8, 2025., Disp: 30 tablet, Rfl: 2  •  traZODone (DESYREL) 50 mg tablet, Take 1 tablet (50 mg total) by mouth daily at bedtime as needed for sleep, Disp: 30 tablet, Rfl: 1  •  hydrALAZINE (APRESOLINE) 100 MG tablet, Take 1 tablet (100 mg total) by mouth every 8 (eight) hours, Disp: 90 tablet, Rfl: 2

## 2025-05-21 ENCOUNTER — TELEMEDICINE (OUTPATIENT)
Dept: SPEECH THERAPY | Facility: CLINIC | Age: 40
End: 2025-05-21
Attending: INTERNAL MEDICINE
Payer: MEDICARE

## 2025-05-21 DIAGNOSIS — R41.841 COGNITIVE COMMUNICATION DEFICIT: Primary | ICD-10-CM

## 2025-05-21 DIAGNOSIS — I63.9 CEREBROVASCULAR ACCIDENT (CVA), UNSPECIFIED MECHANISM (HCC): ICD-10-CM

## 2025-05-21 PROCEDURE — 92507 TX SP LANG VOICE COMM INDIV: CPT

## 2025-05-22 ENCOUNTER — OFFICE VISIT (OUTPATIENT)
Facility: CLINIC | Age: 40
End: 2025-05-22
Attending: INTERNAL MEDICINE
Payer: MEDICARE

## 2025-05-22 ENCOUNTER — APPOINTMENT (OUTPATIENT)
Facility: CLINIC | Age: 40
End: 2025-05-22
Payer: MEDICARE

## 2025-05-22 DIAGNOSIS — R26.89 IMBALANCE: Primary | ICD-10-CM

## 2025-05-22 DIAGNOSIS — R29.90 STROKE-LIKE SYMPTOM: ICD-10-CM

## 2025-05-22 PROCEDURE — 97112 NEUROMUSCULAR REEDUCATION: CPT | Performed by: PHYSICAL THERAPIST

## 2025-05-22 NOTE — PROGRESS NOTES
"Daily Note   POC expires Auth Status Total   Visits  Start date  Expiration date PT/OT + Visit Limit? Co-Insurance   25 Auth req after 24v NA NA NA BOMN Yes, $0 co-pay                                                                      Visit/Unit Tracking  AUTH Status:  Date 5/13 5/15 5/20 5/22           Auth req after 24v   used Used 1 1 1 1            Remaining  17 16 15   14             Today's date: 2025  Patient name: Mateus Mckeon  : 1985  MRN: [id number]  Referring provider: Dania Sher DO  Dx:   Encounter Diagnosis     ICD-10-CM    1. Imbalance  R26.89       2. Stroke-like symptom  R29.90                          Subjective: Patient presents to PT without new complaints     Objective: See treatment diary below    NMR/TA: (**gait belt, CG/CS for all activities**)  BP: 120/70 mmHg R UE seated on arrival    (1-2 minute seated rest break before ea exercise)  - Standing marching with 1 UE support x 1 min  - Sit to stands x 1 min  - Seated LAQ 2# ankle weights  - Seated marching 2# ankle weights  - Fwd/bwd over 6\" haydee; 10x with 1UE, 10x with no UE support; 2# ankle weights  - Lateral over 6\" haydee; 10x with 1UE, 10x with no UE support; 2# ankle weights  - Walking while carrying 5.5# tidal tank 50 ft x 2  - Sidestepping w/ GTB around distal thighs: 10ft x 6 laps   - Heel toe raises 30x      Assessment: Patient tolerated treatment fair with emphasis on initiating exercise program centered on strengthening and endurance. Seated exercises performed as active rest breaks. Able to perform sidestepping safely without guarding. Plan to continue to progress as tolerated. Patient would benefit from continued PT to maximize function and reduce risk for falls.       Plan: Continue per plan of care.           Outcome Measures Initial Eval  24 PN/DC  2024 Initial Evaluation  24 PN  2024 Re-eval  2025 Re-eval  2025 Re-eval  4/3/2025 RE  25   5xSTS " 31.25 sec 13.67 sec   no UE defer 16.33 sec,   no UE 21.03 sec, no UE  13.07 sec, no UE 16.44 sec, no UE 18.00 sec, no UE   TUG 16.3 sec with rollator    23.6 sec no AD 11.48 sec   no AD 18.91 sec w/ rollator     17.16 sec no AD 12.18 sec   no AD 15.72 sec, no AD 10.87 sec, no AD 13.02 sec, no AD 12.00 sec, no AD   10 meter  1.11 m/s self selected pace 0.84 m/s self selected pace  1.05 m/s self selected pace 0.74 m/s 1.14 m/s  0.67 m/s w/ SPC   MEDRANO  49/56 32/56 40/56 40/56 43/56     FGA 12/30 15/30 defer 12/30 11/30 16/30  16/30   6MWT 900 ft 955 ft no AD defer 825 ft no  ft, no  ft, no AD 1,040 ft, no AD defer   mCTSIB  FTEO firm  FTEC firm  FTEO foam  FTEC foam   30 sec  30 sec  30 sec  Unable defer 30 sec  30 sec  30 sec  2 sec 30 sec  30 sec  16 sec  2 sec 30 sec  30 sec  17 sec  0 sec 30 sec  30 sec  8 sec  0 sec 30 sec  30 sec  15 sec  0 sec   ABC  66.88% 62.5% 57.5% 49.38% 60.63% 62.5% defer

## 2025-05-27 ENCOUNTER — APPOINTMENT (OUTPATIENT)
Facility: CLINIC | Age: 40
End: 2025-05-27
Payer: MEDICARE

## 2025-05-27 ENCOUNTER — TELEPHONE (OUTPATIENT)
Age: 40
End: 2025-05-27

## 2025-05-27 ENCOUNTER — TELEPHONE (OUTPATIENT)
Dept: SPEECH THERAPY | Facility: CLINIC | Age: 40
End: 2025-05-27

## 2025-05-27 NOTE — TELEPHONE ENCOUNTER
Patient's parent/guardian contacted the office to schedule a follow up visit with provider. Patient is now scheduled for 8/14/25  at 10:30am virtually.

## 2025-05-27 NOTE — PROGRESS NOTES
Speech Treatment Note    Today's date: 2025  Patient name: Mateus Mckeon  : 1985  MRN: [id number]  Referring provider: Dania Sher DO  Dx:   Encounter Diagnosis     ICD-10-CM    1. Cognitive communication deficit  R41.841       2. Cerebrovascular accident (CVA), unspecified mechanism (HCC)  I63.9                         Start Time: 0800  Stop Time: 0845  Total time in clinic (min): 45 minutes    POC expires Auth Status Total   Visits  Start date  Expiration date PT/OT + Visit Limit? Co-Insurance   25 Medicare  BOMN 3/13/25 N/A No  Yes                                             Visit/Unit Tracking  AUTH Status: BOMN Date           Visits  Authed: AFTER 24 VISTS. Used 1 2 3 4 5 6 7 8 9 10     PN: every 10th visit  Remaining  10 9 8 7 6 5 4 3 2 1            Subjective/Behavioral: Patient arrived on time and attended to all session activities. Patient appeared to be doing well and did not complain of pain.     Short-term goals:  1. Patient will complete attention dependent tasks with less than 2 errors per activity or 90% accuracy.   Paulo was tasked sorting cards into three groups (e.g., red, black, face) while simultaneously tracking 2 target cards (e.g., 2 and 10) and naming an item in a predetermined category (e.g., song title) when either card was shown. Paulo made 0 attention errors. Patient appeared to benefit from using an association strategy (e.g., naming song titles of familiar songs, using an alphabet strategy). Patient was asked to memorize the list of eight song titles and not write the named items down, thus increasing task complexity. Patient demonstrated increased processing time when naming songs. Patient self-corrected sorting errors, yet forgot to keep sorting half-way through the task. Paulo accurately recalled 2/8 songs independently (25% accuracy), yet recalled all 8 songs (100% accuracy) when provided min-mod clinician cues in the form of  "semantic cues.       2. Improve high level word finding to 80% accuracy  Bill accurately named song titles with constraints remarked upon in goal one with 87% accuracy (I). Patient reported naming \"obscure\" song titles to prevent repetition. Patient further mentioned that choosing a word that started with relevant letter aided in word recall. Patient demonstrated increased processing time.    3. Complete executive function dependent tasks with 80% accuracy or higher.   Not addressed this session.          Long-term goals:  -Improve cognitive linguistic function.     Skilled Intervention Statement  Titrated clueing was used throughout the above mentioned activities. Titrated clueing is a skilled, hierarchical intervention which allows for the patient to arrive at a correct response which would otherwise be outside their current capacity. It is initiated by the therapist and is based on clinical analysis of patient's response to the activity presented by the therapist. Under this system, the activity is then adjusted slightly in difficulty in order to maximize performance, which, in turn, drives recovery or helps maintain stability. Titrated clueing allows for maximum neuro-cognitive stimulation while permitting the patient a meaningful measure of control over the task.     Other:Patient was provided with home exercises/ activies to target goals in plan of care.   Recommendations:Continue with Plan of Care  "

## 2025-05-28 ENCOUNTER — OFFICE VISIT (OUTPATIENT)
Dept: SPEECH THERAPY | Facility: CLINIC | Age: 40
End: 2025-05-28
Attending: INTERNAL MEDICINE
Payer: MEDICARE

## 2025-05-28 DIAGNOSIS — R41.841 COGNITIVE COMMUNICATION DEFICIT: Primary | ICD-10-CM

## 2025-05-28 DIAGNOSIS — I63.9 CEREBROVASCULAR ACCIDENT (CVA), UNSPECIFIED MECHANISM (HCC): ICD-10-CM

## 2025-05-28 PROCEDURE — 92507 TX SP LANG VOICE COMM INDIV: CPT

## 2025-05-29 ENCOUNTER — OFFICE VISIT (OUTPATIENT)
Facility: CLINIC | Age: 40
End: 2025-05-29
Attending: INTERNAL MEDICINE
Payer: MEDICARE

## 2025-05-29 ENCOUNTER — EVALUATION (OUTPATIENT)
Facility: CLINIC | Age: 40
End: 2025-05-29
Attending: INTERNAL MEDICINE
Payer: MEDICARE

## 2025-05-29 ENCOUNTER — APPOINTMENT (OUTPATIENT)
Facility: CLINIC | Age: 40
End: 2025-05-29
Payer: MEDICARE

## 2025-05-29 DIAGNOSIS — R29.90 STROKE-LIKE SYMPTOM: ICD-10-CM

## 2025-05-29 DIAGNOSIS — R26.89 IMBALANCE: Primary | ICD-10-CM

## 2025-05-29 DIAGNOSIS — I63.89 CEREBROVASCULAR ACCIDENT (CVA) DUE TO OTHER MECHANISM (HCC): Primary | ICD-10-CM

## 2025-05-29 PROCEDURE — 97530 THERAPEUTIC ACTIVITIES: CPT

## 2025-05-29 PROCEDURE — 97112 NEUROMUSCULAR REEDUCATION: CPT | Performed by: PHYSICAL THERAPIST

## 2025-05-29 NOTE — PROGRESS NOTES
"OCCUPATIONAL THERAPY RE-EVALUATION    Today's Date: 2025  Patient Name: Mateus Mckeon  : 1985  MRN: [id number]  Referring Provider: Dania Sher DO  Dx: Cerebrovascular accident (CVA) due to other mechanism (HCC) [I63.89]    SKILLED ANALYSIS:    On 25 pt was admitted to the hospital for 2 days due to stroke like symptoms, MRI was performed with no acute findings. Pt presents to re-evaluation on 25 to re-evaluate due to his recent hospitalization. Pt was evaluated with AROM testing, MMT, dynamometer, pinch meter, 9-hole peg test, FDT, and skilled clinical observation. Based on patient report and assessments today there has been no significant changes in functional status with his R UE. Pt continues to work on physical therapy and speech therapy at this point. Discussed with pt for him to continue with his prior HEP and to continue with keeping active with functional tasks and use of his R UE during daily tasks, which he was in agreement with. Skilled outpatient OT is not recommended, pt to continue with his previous HEP.     Subjective:   Pt reports feeling more deconditioned since his hospitalization, however does not notice any significant changes in his R UE since he was previously discharged on 4/15.     PLAN OF CARE START: 2025  PLAN OF CARE END:   FREQUENCY: Discharge  PRECAUTIONS: Renal failure, actively going through dialysis; type 1 diabetes; HTN; SAH; seizure (last one was )    Subjective    Mechanism of Injury  As per hospitalization on : \"Underwent MRI of the brain shows no acute infarct, patient is cleared for discharge to continue with dialysis as scheduled for underlying ESRD. Patient has been resumed on home dose of antihypertensive, plan was discussed with the mother at bedside, clonidine was reduced to 0.2 mg 2 times a day which will be increased to 0.2 mg 3 times a day and may consider clonidine patch as outpatient. Blood pressure has " Dyspnea   AMBULATORY CARE:   What is dyspnea? Dyspnea is breathing difficulty or discomfort  You may have labored, painful, or shallow breathing  You may feel breathless or short of breath  Dyspnea can occur during rest or with activity  You may have dyspnea for a short time, or it might become chronic  Dyspnea is often a symptom of a disease or condition  An allergic reaction, anxiety, or travel to high altitudes can increase your risk for dyspnea  Your risk is also increased by a lung condition such as asthma, a heart condition such as heart failure, or a nerve condition  Being overweight or smoking cigarettes can also lead to dyspnea  Signs and symptoms that can occur with dyspnea:   · Chest tightness or pain    · Cough or a coarse or high-pitched noise when you breathe    · Pale and sweaty, cool skin    · Confusion and tiredness    · Bluish-gray lips or nails    Seek care immediately if:   · Your signs and symptoms are the same or worse within 24 hours of treatment  · You have shaking chills or a fever over 102°F      · You have new pain, pressure, or tightness in your chest      · You have a new or worse cough or wheezing, or you cough up blood  · You feel like you cannot get enough air  · The skin over your ribs or on your neck sinks in when you breathe  · You have a severe headache with vomiting and abdominal pain  · You feel confused or dizzy  Call your doctor if:   · You have questions or concerns about your condition or care  Treatment:  You will work with your healthcare provider to treat the condition causing your dyspnea  You may need the following to improve your symptoms:  · Oxygen therapy  may be used to help you breathe easier  You may need oxygen if your blood oxygen level is lower than it should be  · Medicines  may be used to treat the cause of your dyspnea  Medicines may reduce swelling in your airway or decrease extra fluid from around your heart or lungs   Other "improved with current antihypertensive regimen and HD session.\"    Occupational Profile  From previous EOC: Pt reports no changes in living status, other than getting a dog.     Pt lives in a home with his parents; has one flight to bedroom and has AD throughout the house (grab bars etc.). Paulo reports he needs help with preparing meals (could get by making 1 meal for himself but not all day), laundry, and driving. He is independent with dressing and bathing, \"but it takes it out of me. I could do some things but I need help throughout the day.\"    It is of note that the pt is actively getting dialysis; has to sit still for 4 hours  Pt reports some numbness in fingers on left side (L sided forearm is where dialysis is put in) and notes difficulty with thinking and memory since stroke. No changes in vision     He enjoys spending time with nieces and nephews and and spending time with his dog.     PATIENT GOAL: Pt wants to check for any decline since his most recent hospitalization.     HISTORY OF PRESENT ILLNESS:     PMH:   Past Medical History:   Diagnosis Date    Acute kidney injury (HCC)     Ambulates with cane     Anuria     Anxiety     Cellulitis of right elbow 03/31/2021    Chronic kidney disease     COVID-19 10/18/2024    Depression     Diabetes mellitus (HCC)     Diarrhea     Emesis 10/24/2020    End stage renal disease (HCC) 02/11/2018    Formatting of this note might be different from the original. Last Assessment & Plan:  Secondary to DM.  On nightly PD.  Followed by [facility].  [facility] Formatting of this note might be different from the original. Last Assessment & Plan:  Formatting of this note might be different from the original. 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    Esophagitis 07/21/2015    Falls     Gastroparesis     GERD (gastroesophageal reflux disease)     History of shingles 2010    History of " medicines may be used to decrease anxiety and help you feel calm and relaxed  · Pulmonary rehabilitation  is used to reduce your symptoms while keeping you active  You may learn breathing techniques, muscle strengthening, and how to pace yourself when you are active  Manage long-term dyspnea:   · Create an action plan  You and your healthcare provider can work together to create a plan for how to handle episodes of dyspnea  The plan can include daily activities, treatment changes, and what to do if you have severe breathing problems  · Lean forward on your elbows when you sit  This helps your lungs expand and may make it easier to breathe  · Use pursed-lip breathing any time you feel short of breath  Breathe in through your nose and then slowly breathe out through your mouth with your lips slightly puckered  It should take you twice as long to breathe out as it did to breathe in  · Do not smoke  Nicotine and other chemicals in cigarettes and cigars can cause lung damage and make it harder to breathe  Ask your healthcare provider for information if you currently smoke and need help to quit  E-cigarettes or smokeless tobacco still contain nicotine  Talk to your healthcare provider before you use these products  · Reach or maintain a healthy weight  Your healthcare provider can help you create a safe weight loss plan if you are overweight  · Exercise as directed  Exercise can help your lungs work more easily  Exercise can also help you lose weight if needed  Try to get at least 30 minutes of exercise most days of the week  Your healthcare provider can help you create an exercise plan that is safe for you  Follow up with your doctor or specialist as directed:  Write down your questions so you remember to ask them during your visits  © [employer] 2022 Information is for End User's use only and may not be sold, redistributed or otherwise used for commercial purposes   All illustrations and images included in CareNotes® are the copyrighted property of [employer] Inc  or Andrei Daniel  The above information is an  only  It is not intended as medical advice for individual conditions or treatments  Talk to your doctor, nurse or pharmacist before following any medical regimen to see if it is safe and effective for you  transfusion 02/2018    no adverse reaction    Hyperlipidemia     Hyperphosphatemia     Hypertension     Hypoglycemia 07/15/2022    Itching     Mastoiditis of right side 07/15/2022    Muscle weakness     general unsteadiness    Obesity (BMI 30.0-34.9) 09/09/2019    Orthostatic hypotension 10/25/2020    Peripheral polyneuropathy 11/20/2019    PONV (postoperative nausea and vomiting) 01/26/2018    Protein-calorie malnutrition (HCC) 11/23/2020    Recurrent peritonitis (HCC) due to peritoneal dialysis catheter 07/31/2020    Retinopathy     Seizures (HCC)     early 2020 - one time    Skin abnormality     some dime size areas where skin was scratched from itching    Sleep apnea     Spontaneous bacterial peritonitis (HCC) 10/19/2020    Squamous cell skin cancer     left temple    Stroke (HCC)     x2 - off balance/no driving/fatigue    Swelling of both lower extremities     Swelling of joint of upper arm, right 04/03/2024    Traumatic onycholysis 07/21/2022    Vomiting     Wears glasses     Word finding difficulty 11/05/2024       Past Surgical Hx:   Past Surgical History:   Procedure Laterality Date    CARDIAC ELECTROPHYSIOLOGY PROCEDURE N/A 9/21/2023    Procedure: Cardiac loop recorder explant;  Surgeon: Parish Morgan MD;  Location: [facility];  Service: Cardiology    CARDIAC LOOP RECORDER  05/2018    COLONOSCOPY      EGD      EYE SURGERY Right     HEMODIALYSIS ADULT  11/6/2024    IR AV FISTULAGRAM/GRAFTOGRAM  02/23/2021    IR CEREBRAL ANGIOGRAPHY  1/12/2024    IR CEREBRAL ANGIOGRAPHY / INTERVENTION  1/5/2024    IR TUNNELED CENTRAL LINE PLACEMENT  02/16/2021    IR TUNNELED DIALYSIS CATHETER PLACEMENT  11/18/2020    IR TUNNELED DIALYSIS CATHETER REMOVAL  02/12/2021    IR TUNNELED DIALYSIS CATHETER REMOVAL  03/11/2021    MOHS SURGERY Left 12/14/2022    Left temple with Dr. Hassan    PERITONEAL CATHETER INSERTION N/A 08/27/2018    Procedure: UNROOF PD CATHETER;  Surgeon: Felipe Lindo DO;  Location: [facility];   Service: General    MD ARTERIOVENOUS ANASTOMOSIS OPEN DIRECT Left 11/09/2020    Procedure: CREATION FISTULA  ARTERIOVENOUS (AV) - LEFT WRIST;  Surgeon: Placido Altamirano MD;  Location: [facility];  Service: Vascular    MD ESOPHAGOGASTRODUODENOSCOPY TRANSORAL DIAGNOSTIC N/A 04/18/2019    Procedure: ESOPHAGOGASTRODUODENOSCOPY (EGD);  Surgeon: Ale Figueroa MD;  Location: [facility];  Service: Gastroenterology    MD LAPS INSERTION TUNNELED INTRAPERITONEAL CATHETER N/A 08/06/2018    Procedure: LAPAROSCOPIC PD CATHETER PLACEMENT;  Surgeon: Felipe Lindo DO;  Location[facility];  Service: General    MD REMOVAL TUNNELED INTRAPERITONEAL CATHETER N/A 11/18/2020    Procedure: REMOVAL CATHETER PERITONEAL DIALYSIS;  Surgeon: Abdifatah Ty MD;  Location[facility];  Service: General    TONSILLECTOMY      UPPER GASTROINTESTINAL ENDOSCOPY          Pain: FLACC 0     Objective    Upper Extremities:  Pt is right hand dominant    Range of Motion:  AROM:   R UE   - Shoulder flexion: 150* - WFL  -Shoulder abduction: 178* - WNL  - Shoulder extension: WNL  - Elbow flexion WNL  - Elbow extension: WNL  - Wrist flexion: WNL  - Wrist extension: WNL  - Pronation: WNL  - Supination: WNL  - Finger extension: WNL  - Finger flexion: WNL    L UE : 100%     Manual Muscle Testing:   R UE:  - Shoulder flexors: 5/5  - Shoulder extensors: 5/5  - Shoulder abductors: 4+/5    - Shoulder external rotators: 5/5    - Shoulder internal rotators: 5/5  - Elbow flexors: 5/5  - Elbow extensors: 5/5  - Wrist flexors: 5/5  - Wrist extensors: 5/5  L UE:  - Shoulder flexors: 5/5  - Shoulder extensors: 5/5  - Shoulder abductors: 5/5   - Shoulder external rotators: 5/5   - Shoulder internal rotators: 5/5  - Elbow flexors: 5/5  - Elbow extensors: 5/5  - Wrist flexors: 5/5  - Wrist extensors: 5/5                  ANGEL: RUE: 72lb LUE: 112lb   The age norm is approximately R: 116.8;bs and L: 112.8 lbs, indicating decreased  strength..                 2 point pinch:  RUE: 7lb , LUE: 10.5lb  (age norms are 18 lbs)  3 point pinch: RUE: 13lb , LUE: 20lb  (age norms are 24 lbs)  Lateral pinch: RUE: 19lb , LUE: 21lb  (age norms are 25 lbs)                   NINE-HOLE PEG TEST: assesses dexterity/fine motor coordination   R hand: 47 seconds   L hand: 35 seconds   Pt demonstrates decreased FMC compared to norms for age/sex (L: 18.62 seconds and R: 17.71 seconds)     Functional Dexterity Test: assesses patient's ability to use the hand for daily tasks requiring a 3-jaw federico prehension between the fingers and the thumb. Completed in a zig-zag pattern with unaffected UE first. 1  5-second penalty (each):  -Supinating forearm to assist  -Touching the board for assist    10-second penalty:  -Dropping a peg    R UE: 1 minute 5 seconds  L UE:  59 seconds     Norms based on age/sex: (Lucy et. al., 2013)    Age Group Sex Non-Dominant (50th percentile) Dominant (50th percentile)   20-49 Male 23.2 24.1     Pt performance on Functional Dexterity Test implies non functional hand compared to norms for age/sex.    Score by Functional Level Range (dominant hand) Range (non-dominant hand)   Functional  16-25 seconds 18-27 seconds   Moderately Functional  26-33 seconds 28-45 seconds   Minimally Functional 34-50 seconds 46-55 seconds   Nonfunctional >50 seconds >55 seconds

## 2025-05-29 NOTE — PROGRESS NOTES
"Daily Note   POC expires Auth Status Total   Visits  Start date  Expiration date PT/OT + Visit Limit? Co-Insurance   25 Auth req after 24v NA NA NA BOMN Yes, $0 co-pay                                                                      Visit/Unit Tracking  AUTH Status:  Date 5/13 5/15 5/20 5/22           Auth req after 24v   used Used 1 1 1 1            Remaining  17 16 15   14             Today's date: 2025  Patient name: Mateus Mckeon  : 1985  MRN: [id number]  Referring provider: Dania Sher DO  Dx:   Encounter Diagnosis     ICD-10-CM    1. Imbalance  R26.89       2. Stroke-like symptom  R29.90                          Subjective: Patient presents to PT reporting he is tired today    Objective: See treatment diary below    NMR/TA: (**gait belt, CG/CS for all activities**)      (1-2 minute seated rest break before ea exercise)  - Standing marching with 1 UE support x 2 min  - Sit to stands x 1 min  - Seated LAQ 2# ankle weights  - Seated hip add iso x 2 min  - FWD/BWD haydee (6\"): 2 min  - Seated heel/toe raises: 2 min  - LAT haydee (6\"): 2 min        Assessment: Patient tolerated treatment fairly. Rotated standing and seated exercises today to maximize patient participation with all exercises. When standing patient displays postural instability to right side with occasional UE use to maintain balance. Gait belt utilized when standing to maximize patient safety. Plan to continue to progress as tolerated. Patient would benefit from continued PT to maximize function and reduce risk for falls.       Plan: Continue per plan of care.           Outcome Measures Initial Eval  24 PN/DC  2024 Initial Evaluation  24 PN  2024 Re-eval  2025 Re-eval  2025 Re-eval  4/3/2025 RE  25   5xSTS 31.25 sec 13.67 sec   no UE defer 16.33 sec,   no UE 21.03 sec, no UE  13.07 sec, no UE 16.44 sec, no UE 18.00 sec, no UE   TUG 16.3 sec with rollator    23.6 sec no AD " 11.48 sec   no AD 18.91 sec w/ rollator     17.16 sec no AD 12.18 sec   no AD 15.72 sec, no AD 10.87 sec, no AD 13.02 sec, no AD 12.00 sec, no AD   10 meter  1.11 m/s self selected pace 0.84 m/s self selected pace  1.05 m/s self selected pace 0.74 m/s 1.14 m/s  0.67 m/s w/ SPC   MEDRANO  49/56 32/56 40/56 40/56 43/56     FGA 12/30 15/30 defer 12/30 11/30 16/30 16/30   6MWT 900 ft 955 ft no AD defer 825 ft no  ft, no  ft, no AD 1,040 ft, no AD defer   mCTSIB  FTEO firm  FTEC firm  FTEO foam  FTEC foam   30 sec  30 sec  30 sec  Unable defer 30 sec  30 sec  30 sec  2 sec 30 sec  30 sec  16 sec  2 sec 30 sec  30 sec  17 sec  0 sec 30 sec  30 sec  8 sec  0 sec 30 sec  30 sec  15 sec  0 sec   ABC  66.88% 62.5% 57.5% 49.38% 60.63% 62.5% defer

## 2025-05-30 NOTE — PROGRESS NOTES
Speech Treatment Note    Today's date: 2025  Patient name: Mateus Mckeon  : 1985  MRN: [id number]  Referring provider: Dania Sher DO  Dx:   Encounter Diagnosis     ICD-10-CM    1. Cognitive communication deficit  R41.841       2. Cerebrovascular accident (CVA), unspecified mechanism (HCC)  I63.9                           Start Time: 930  Stop Time: 1015  Total time in clinic (min): 45 minutes    POC expires Auth Status Total   Visits  Start date  Expiration date PT/OT + Visit Limit? Co-Insurance   25 Medicare  BOMN 3/13/25 N/A No  Yes                                             Visit/Unit Tracking  AUTH Status: BOMN Date          Visits  Authed: AFTER 24 VISTS. Used 1 2 3 4 5 6 7 8 9 10     PN: every 10th visit  Remaining  10 9 8 7 6 5 4 3 2 1            Subjective/Behavioral: Patient arrived on time and attended to all session activities. Patient did not complain of pain. Patient informed clinician he had to cancel session on 6/3/25.    Short-term goals:  1. Patient will complete attention dependent tasks with less than 2 errors per activity or 90% accuracy.   Paulo was tasked sorting cards into three groups (e.g., red, black, face) while simultaneously tracking 4 target cards (e.g., 2-6-8-10) and naming an item in a predetermined category (e.g., Things that are dull and things that are round) when either card was shown. Bill made 0 attention errors. Patient appeared to benefit from using an association and categorization. Patient was asked to memorize the list of sixteen items and not write the named items down, thus increasing task complexity. Patient demonstrated increased processing time when naming things that were dull, yet appeared to benefit from naming items that were physically dull and metaphorically. Patient achieved 81% accuracy in immediate recall (increased to 100% accuracy with min-mod clues) and 81% delayed recall.    Attention /  "Alternating Attention: Counting 1-50 and replacing multiples of 5 with the word “Boom” and double digits of same number with “Snap: 100% acc (I). Two attempts were made one with clinician and patient participating. Second attempt with only patient. Patient demonstrated high accuracy, yet reported his \"brain was thinking.\"    2. Improve high level word finding to 80% accuracy  Bill accurately named items in goal one with constraints remarked upon in goal one with 93% accuracy (I). Patient demonstrated increased processing time.    3. Complete executive function dependent tasks with 80% accuracy or higher.   Deduction Puzzle #4 was given for HEP to facilitate carryover.           Long-term goals:  -Improve cognitive linguistic function.     Skilled Intervention Statement  Titrated clueing was used throughout the above mentioned activities. Titrated clueing is a skilled, hierarchical intervention which allows for the patient to arrive at a correct response which would otherwise be outside their current capacity. It is initiated by the therapist and is based on clinical analysis of patient's response to the activity presented by the therapist. Under this system, the activity is then adjusted slightly in difficulty in order to maximize performance, which, in turn, drives recovery or helps maintain stability. Titrated clueing allows for maximum neuro-cognitive stimulation while permitting the patient a meaningful measure of control over the task.     Other:Patient was provided with home exercises/ activies to target goals in plan of care.   Recommendations:Continue with Plan of Care  "

## 2025-06-02 ENCOUNTER — OFFICE VISIT (OUTPATIENT)
Dept: SPEECH THERAPY | Facility: CLINIC | Age: 40
End: 2025-06-02
Attending: INTERNAL MEDICINE
Payer: MEDICARE

## 2025-06-02 DIAGNOSIS — R41.841 COGNITIVE COMMUNICATION DEFICIT: Primary | ICD-10-CM

## 2025-06-02 DIAGNOSIS — I63.9 CEREBROVASCULAR ACCIDENT (CVA), UNSPECIFIED MECHANISM (HCC): ICD-10-CM

## 2025-06-02 PROCEDURE — 92507 TX SP LANG VOICE COMM INDIV: CPT

## 2025-06-03 ENCOUNTER — APPOINTMENT (OUTPATIENT)
Facility: CLINIC | Age: 40
End: 2025-06-03
Attending: INTERNAL MEDICINE
Payer: MEDICARE

## 2025-06-03 ENCOUNTER — APPOINTMENT (OUTPATIENT)
Dept: SPEECH THERAPY | Facility: CLINIC | Age: 40
End: 2025-06-03
Attending: INTERNAL MEDICINE
Payer: MEDICARE

## 2025-06-05 ENCOUNTER — OFFICE VISIT (OUTPATIENT)
Dept: SPEECH THERAPY | Facility: CLINIC | Age: 40
End: 2025-06-05
Attending: INTERNAL MEDICINE
Payer: MEDICARE

## 2025-06-05 DIAGNOSIS — I63.9 CEREBROVASCULAR ACCIDENT (CVA), UNSPECIFIED MECHANISM (HCC): ICD-10-CM

## 2025-06-05 DIAGNOSIS — R41.841 COGNITIVE COMMUNICATION DEFICIT: Primary | ICD-10-CM

## 2025-06-05 PROCEDURE — 92507 TX SP LANG VOICE COMM INDIV: CPT

## 2025-06-05 NOTE — PROGRESS NOTES
"Speech Treatment Note    Today's date: 2025  Patient name: Mateus Mckeon  : 1985  MRN: [id number]  Referring provider: Dania Sher DO  Dx:   Encounter Diagnosis     ICD-10-CM    1. Cognitive communication deficit  R41.841       2. Cerebrovascular accident (CVA), unspecified mechanism (HCC)  I63.9                                        POC expires Auth Status Total   Visits  Start date  Expiration date PT/OT + Visit Limit? Co-Insurance   25 Medicare  BOMN 3/13/25 N/A No  Yes                                             Visit/Unit Tracking  AUTH Status: BOMN Date         Visits  Authed: AFTER 24 VISTS. Used 1 2 3 4 5 6 7 8 9 10     PN: every 10th visit  Remaining  10 9 8 7 6 5 4 3 2 1            Subjective/Behavioral: Patient arrived on time and attended to all session activities. Patient did not complain of pain. Patient reported feeling unsteady when walking and reports that \"it could have something to do with low blood pressure.\" Clinician monitored patient throughout session and walked with patient to and from treatment room.     Short-term goals:  1. Patient will complete attention dependent tasks with less than 2 errors per activity or 90% accuracy.   Bill was tasked sorting cards into three groups (e.g., red, black, face) while simultaneously tracking 4 target cards (e.g., 3-5-7-9) and naming an item in a predetermined category (e.g., Things that are sharp and things that are blue) when either card was shown. Bill made 0 attention errors. Patient appeared to benefit from using an association and categorization. Patient was asked to memorize the list of sixteen items and not write the named items down, thus increasing task complexity. Patient achieved 81% accuracy in immediate recall and 75% delayed recall. Accuracy improved to 100% with minimal semantic cues. Attention-focused HEP assignment was provided for carryover. Review next session.    2. " "Improve high level word finding to 80% accuracy  Bill accurately named items in goal one with constraints remarked upon in goal one with 100% accuracy (I).     3. Complete executive function dependent tasks with 80% accuracy or higher.   To address working memory and executive function, patient completed a deduction puzzle #4. Patient was tasked with placing information from clues into a grid. Patient was previously given exercise for HEP and reported difficulty. Exercise was completed during session for clinician observation. Patient reported exercise as \"overwhelming.\" Task was abandoned due to increased cognitive burden. Task will be modified/titrated down and attempted in future session.       Long-term goals:  -Improve cognitive linguistic function.     Skilled Intervention Statement  Titrated clueing was used throughout the above mentioned activities. Titrated clueing is a skilled, hierarchical intervention which allows for the patient to arrive at a correct response which would otherwise be outside their current capacity. It is initiated by the therapist and is based on clinical analysis of patient's response to the activity presented by the therapist. Under this system, the activity is then adjusted slightly in difficulty in order to maximize performance, which, in turn, drives recovery or helps maintain stability. Titrated clueing allows for maximum neuro-cognitive stimulation while permitting the patient a meaningful measure of control over the task.     Other:Patient was provided with home exercises/ activies to target goals in plan of care.   Recommendations:Continue with Plan of Care  "

## 2025-06-06 DIAGNOSIS — R42 VERTIGO: ICD-10-CM

## 2025-06-06 RX ORDER — ONDANSETRON 4 MG/1
4 TABLET, FILM COATED ORAL EVERY 8 HOURS PRN
Qty: 90 TABLET | Refills: 0 | Status: SHIPPED | OUTPATIENT
Start: 2025-06-06

## 2025-06-06 NOTE — TELEPHONE ENCOUNTER
Patient lost 5/29/25 script    Reason for call:   [x] Refill   [] Prior Auth  [] Other:     Office:   [x] PCP/Provider - Dania Sher   [] Specialty/Provider -     Medication: ondansetron (ZOFRAN) 4 mg tablet     Dose/Frequency: Take 1 tablet (4 mg total) by mouth every 8 (eight) hours as needed for nausea or vomiting     Quantity: 90    Pharmacy: [facility]   Does the patient have enough for 3 days?   [] Yes   [x] No - Send as HP to POD

## 2025-06-10 ENCOUNTER — TELEPHONE (OUTPATIENT)
Dept: NEUROLOGY | Facility: CLINIC | Age: 40
End: 2025-06-10

## 2025-06-10 NOTE — TELEPHONE ENCOUNTER
Called and left vm to notify of change of schedule as provider is unable to come in to the office but we can change appt to VR. Advised to call office to if agreeable to change apt

## 2025-06-11 NOTE — TELEPHONE ENCOUNTER
Patients mother Francine returned call to change appointment to virtual.    Francine is agreeable to have appointment Virtual on 6/12/2025 at 3:30 pm with Dr. Penny.

## 2025-06-12 ENCOUNTER — OFFICE VISIT (OUTPATIENT)
Dept: SPEECH THERAPY | Facility: CLINIC | Age: 40
End: 2025-06-12
Attending: INTERNAL MEDICINE
Payer: MEDICARE

## 2025-06-12 ENCOUNTER — OFFICE VISIT (OUTPATIENT)
Facility: CLINIC | Age: 40
End: 2025-06-12
Attending: INTERNAL MEDICINE
Payer: MEDICARE

## 2025-06-12 ENCOUNTER — TELEMEDICINE (OUTPATIENT)
Dept: NEUROLOGY | Facility: CLINIC | Age: 40
End: 2025-06-12
Payer: MEDICARE

## 2025-06-12 DIAGNOSIS — E11.40 DIABETIC NEUROPATHY (HCC): Chronic | ICD-10-CM

## 2025-06-12 DIAGNOSIS — R29.90 STROKE-LIKE SYMPTOM: ICD-10-CM

## 2025-06-12 DIAGNOSIS — R26.89 IMBALANCE: Primary | ICD-10-CM

## 2025-06-12 DIAGNOSIS — G46.4 CEREBELLAR STROKE SYNDROME: Primary | Chronic | ICD-10-CM

## 2025-06-12 DIAGNOSIS — I63.9 CEREBROVASCULAR ACCIDENT (CVA), UNSPECIFIED MECHANISM (HCC): ICD-10-CM

## 2025-06-12 DIAGNOSIS — E78.5 HYPERLIPIDEMIA: Chronic | ICD-10-CM

## 2025-06-12 DIAGNOSIS — R56.9 PROVOKED SEIZURE (HCC): ICD-10-CM

## 2025-06-12 DIAGNOSIS — R41.841 COGNITIVE COMMUNICATION DEFICIT: Primary | ICD-10-CM

## 2025-06-12 PROCEDURE — 97110 THERAPEUTIC EXERCISES: CPT | Performed by: PHYSICAL THERAPIST

## 2025-06-12 PROCEDURE — G2211 COMPLEX E/M VISIT ADD ON: HCPCS | Performed by: PSYCHIATRY & NEUROLOGY

## 2025-06-12 PROCEDURE — 92507 TX SP LANG VOICE COMM INDIV: CPT

## 2025-06-12 PROCEDURE — 99215 OFFICE O/P EST HI 40 MIN: CPT | Performed by: PSYCHIATRY & NEUROLOGY

## 2025-06-12 PROCEDURE — 97112 NEUROMUSCULAR REEDUCATION: CPT | Performed by: PHYSICAL THERAPIST

## 2025-06-12 NOTE — PROGRESS NOTES
"Speech Treatment Note    Today's date: 2025  Patient name: Mateus Mckeon  : 1985  MRN: [id number]  Referring provider: Dania Sher DO  Dx:   Encounter Diagnosis     ICD-10-CM    1. Cognitive communication deficit  R41.841       2. Cerebrovascular accident (CVA), unspecified mechanism (HCC)  I63.9                       Start Time: 0845  Stop Time: 09  Total time in clinic (min): 45 minutes    POC expires Auth Status Total   Visits  Start date  Expiration date PT/OT + Visit Limit? Co-Insurance   25 Medicare  BOMN 3/13/25 N/A No  Yes                                             Visit/Unit Tracking  AUTH Status: BOMN Date        Visits  Authed: AFTER 24 VISTS. Used 1 2 3 4 5 6 7 8 9 10     PN: every 10th visit  Remaining  10 9 8 7 6 5 4 3 2 1            Subjective/Behavioral: Patient arrived on time and attended to all session activities. Patient did not complain of pain. Patient reported having to cancel last session as glucose monitor was malfunctioning. Patient's glucose monitor is reportedly now functioning. Patient reports difficulty completing the HEP. Clinician encouraged patient to select a previously provided HEP assignment to complete for next session.     Short-term goals:  1. Patient will complete attention dependent tasks with less than 2 errors per activity or 90% accuracy.   Bill was tasked sorting cards into three groups (e.g., odd numbers, even numbers, face) while simultaneously tracking 4 target cards (e.g., 3-8-4-7) and naming an item in a predetermined category (e.g., Things that are shiny and things that are soft) when either card was shown. Bill made 5-6 attention errors. Patient exhibited difficulty with sorting cards in the odd/even pattern and required clinician cues to readjust. Patient demonstrated increased difficulty with naming items in the two predetermined groups and reported \"wanting to name things that are sharp.\" " Patient named 14/16 possible items independently. Patient did not write list of items during activity, yet was shown written list to study before recall was attempted.  Patient achieved 75% accuracy in immediate recall. Accuracy improved to 100% with minimal semantic cues.     2. Improve high level word finding to 80% accuracy  See goal one above.    3. Complete executive function dependent tasks with 80% accuracy or higher.   Not addressed this session.      Long-term goals:  -Improve cognitive linguistic function.     Skilled Intervention Statement  Titrated clueing was used throughout the above mentioned activities. Titrated clueing is a skilled, hierarchical intervention which allows for the patient to arrive at a correct response which would otherwise be outside their current capacity. It is initiated by the therapist and is based on clinical analysis of patient's response to the activity presented by the therapist. Under this system, the activity is then adjusted slightly in difficulty in order to maximize performance, which, in turn, drives recovery or helps maintain stability. Titrated clueing allows for maximum neuro-cognitive stimulation while permitting the patient a meaningful measure of control over the task.     Other:Patient was provided with home exercises/ activies to target goals in plan of care.   Recommendations:Continue with Plan of Care    Services provided by:    Kimmy Hung M.A., CF-SLP  Temporary License #: [id number]

## 2025-06-12 NOTE — PROGRESS NOTES
"Daily Note   POC expires Auth Status Total   Visits  Start date  Expiration date PT/OT + Visit Limit? Co-Insurance   25 Auth req after 24v NA NA NA BOMN Yes, $0 co-pay                                                                      Visit/Unit Tracking  AUTH Status:  Date 5/13 5/15 5/20 5/22 5/27 5/29 6/12        Auth req after 24v   used Used 1 1 1 1 1 1 1         Remaining  17 16 15   14 13 12 11          Today's date: 2025  Patient name: Mateus Mckeon  : 1985  MRN: [id number]  Referring provider: Dania Sher DO  Dx:   Encounter Diagnosis     ICD-10-CM    1. Imbalance  R26.89       2. Stroke-like symptom  R29.90                            Subjective: Patient presents to PT with no new complaints    Objective: See treatment diary below    NMR/TA: (**gait belt, CG/CS for all activities**)      (1-2 minute seated rest break before ea exercise)  - Standing marching with 1 UE support x 2 min  - Sit to stands x 1 min  - Seated LAQ 2# ankle weights x 2 min   - Fwd/bwd over 6\" haydee; 10x with 1UE, 10x with no UE support; 2# ankle weights  - Seated marching 2# ankle weights x 2 min  - Lateral over 6\" haydee; 10x with 1UE, 10x with no UE support; 2# ankle weights  - Sidestepping w/ GTB around distal thighs: 10ft x 6 laps  - Heel toe raises 30x  - Walking while carrying 5.5# tidal tank 50 ft x 4      Assessment: Patient tolerated treatment well today. Pt displays postural instability with dynamic standing exercises, overall requiring CG. Seated TE performed as active rest breaks. Patient would benefit from continued PT to maximize function and reduce risk for falls.       Plan: Continue per plan of care.           Outcome Measures Initial Eval  24 PN/DC  2024 Initial Evaluation  24 PN  2024 Re-eval  2025 Re-eval  2025 Re-eval  4/3/2025 RE  25   5xSTS 31.25 sec 13.67 sec   no UE defer 16.33 sec,   no UE 21.03 sec, no UE  13.07 sec, no UE 16.44 sec, " no UE 18.00 sec, no UE   TUG 16.3 sec with rollator    23.6 sec no AD 11.48 sec   no AD 18.91 sec w/ rollator     17.16 sec no AD 12.18 sec   no AD 15.72 sec, no AD 10.87 sec, no AD 13.02 sec, no AD 12.00 sec, no AD   10 meter  1.11 m/s self selected pace 0.84 m/s self selected pace  1.05 m/s self selected pace 0.74 m/s 1.14 m/s  0.67 m/s w/ SPC   MEDRANO  49/56 32/56 40/56 40/56 43/56     FGA 12/30 15/30 defer 12/30 11/30 16/30  16/30   6MWT 900 ft 955 ft no AD defer 825 ft no  ft, no  ft, no AD 1,040 ft, no AD defer   mCTSIB  FTEO firm  FTEC firm  FTEO foam  FTEC foam   30 sec  30 sec  30 sec  Unable defer 30 sec  30 sec  30 sec  2 sec 30 sec  30 sec  16 sec  2 sec 30 sec  30 sec  17 sec  0 sec 30 sec  30 sec  8 sec  0 sec 30 sec  30 sec  15 sec  0 sec   ABC  66.88% 62.5% 57.5% 49.38% 60.63% 62.5% defer

## 2025-06-12 NOTE — PROGRESS NOTES
"Virtual Regular Visit  Name: Mateus Mckeon      : 1985      MRN: [id number]  Encounter Provider: Norma Penny MD  Encounter Date: 2025   Encounter department: [facility]  :  Assessment & Plan  Cerebellar stroke syndrome  Incidental chronic stroke   Secondary Prevention -   -for medications - recommend continuation of combination of aspirin and atorvastatin  -Blood Pressure goal < 130/80, BP is currently at goal   -LDL goal <70, last LDL is 33 which is at goal   -RACHEAL screening - no snoring   - cardiac workup - none     Therapy needs  Currently in PT, ST and OT twice a week    Counseling/stroke education -   -I advised patient to avoid using NSAIDs for headaches or other pain and to stick to tylenol if needed  -Recommend lifestyle modifications such as mediterranean diet & regular exercise regimen (brisk walking) atleast 4-5 times a week for 20-30 minutes.   -I educated patient/family regarding medication compliance  -encourage smoking cessation, and control of diabetes and hypertension; defer management to primary        Provoked seizure (HCC)  Follows Dr. Graves       Hyperlipidemia  Continue with atorvastatin for stroke prevention       Diabetic neuropathy (HCC)  Patient maintained on neurontin 100mg in AM and 2 capsules at bedtime  Seems to be making a difference in terms of symptom control  He is able to sleep at night he states    Lab Results   Component Value Date    HGBA1C 5.5 2025          Follow up as needed.     I would be happy to see the patient sooner if any new questions/concerns arise.  Patient/Guardian was advised to the call the office if they have any questions and concerns in the meantime.     We discussed \"red flag\" headache symptoms including symptoms such as facial droop on one side, weakness/paralysis on either side, speech trouble, numbness on one side, balance issues, any vision changes, extreme dizziness or significant increase in " severity of headache or a sudden onset severe headache, patient is to call 9-1-1 immediately or to proceed to the nearest ER.         History of Present Illness     HPI      This is a 38 y/o Male with heterozygous MTHFR mutation, who is here as a follow up for history of SAH and incidental strokes seen on Mri brain which were chronic.    No new headaches, no new TIA/CVA like symptoms. Patient has been on gabapentin 100mg BID for neuropathy. Patient in speech therapy once a week, for word finding difficulty and she says that has subsided since then he has noticed an improvement. He is maintained on aspirin and atorvastatin for stroke prevention.     He presented to the ER on 5/8 with headache, and dizziness. Headache was 4/10 and he was worked up as a stroke alert. He had CT head which was negative. CTA head and neck was negative for LVO. MRI brain without contrast which shows moderate chronic microangiopathic changes and chronic L corona radiata lacunar infarction. Etiology of his presentation was most likely hypertensive urgency. He does follow up with Dr. Graves for provoked seizures.       Review of Systems   Constitutional: Negative.    HENT: Negative.     Eyes: Negative.    Respiratory: Negative.     Cardiovascular: Negative.    Gastrointestinal: Negative.    Endocrine: Negative.    Genitourinary: Negative.    Musculoskeletal: Negative.    Skin: Negative.    Allergic/Immunologic: Negative.    Neurological: Negative.    Hematological: Negative.    Psychiatric/Behavioral: Negative.     All other systems reviewed and are negative.      Objective   There were no vitals taken for this visit.    Physical Exam    Administrative Statements   Encounter provider Norma Pneny MD    The Patient is located at Home and in the following state in which I hold an active license [place].    The patient was identified by name and date of birth. Mateus Mckeon was informed that this is a telemedicine visit and that the visit is  being conducted through the Epic Embedded platform. He agrees to proceed..  My office door was closed. No one else was in the room.  He acknowledged consent and understanding of privacy and security of the video platform. The patient has agreed to participate and understands they can discontinue the visit at any time.    I have spent a total time of 40 minutes in caring for this patient on the day of the visit/encounter including Risks and benefits of tx options, Instructions for management, Counseling / Coordination of care, Documenting in the medical record, Reviewing/placing orders in the medical record (including tests, medications, and/or procedures), Obtaining or reviewing history  , and Communicating with other healthcare professionals , not including the time spent for establishing the audio/video connection.

## 2025-06-16 NOTE — PROGRESS NOTES
"Speech Treatment Note    Today's date: 2025  Patient name: Mateus Mckeon  : 1985  MRN: [id number]  Referring provider: Dania Sher DO  Dx:   Encounter Diagnosis     ICD-10-CM    1. Cognitive communication deficit  R41.841       2. Cerebrovascular accident (CVA), unspecified mechanism (HCC)  I63.9                     Start Time: 0800  Stop Time: 0847  Total time in clinic (min): 47 minutes    POC expires Auth Status Total   Visits  Start date  Expiration date PT/OT + Visit Limit? Co-Insurance   25 Medicare  BOMN 3/13/25 N/A No  Yes                                             Visit/Unit Tracking  AUTH Status: BOMN Date      Visits  Authed: AFTER 24 VISTS. Used 1 2 3 4 5 6 7 8 9 10     PN: every 10th visit  Remaining  10 9 8 7 6 5 4 3 2 1            Subjective/Behavioral: Patient arrived on time and attended to all session activities. Patient did not complain of pain. HEP provided to facilitate carryover.     Short-term goals:  1. Patient will complete attention dependent tasks with less than 2 errors per activity or 90% accuracy.   Paulo was tasked sorting cards into three groups (e.g., odd numbers, even numbers, face) while simultaneously tracking 4 target cards (e.g., 2-3-6-7) and naming an item in a predetermined category (e.g., breakfast cereal and things that are round) when either card was shown. Patient has named \"things that are round\" before; however, clinician challenged patient to name conventionally round items. Bill made 3 attention errors throughout exercise. Patient exhibited difficulty with sorting cards in the odd/even pattern and required clinician cues to readjust. Patient named 16/16 possible items independently. Patient wrote list of named items during activity,which assisted in visual organization.  Patient achieved 87% accuracy in immediate recall. Accuracy improved to 100% with minimal semantic cues. Delayed recall is " at 100% accuracy (I). Consider employing recall tasks in unstructured format to comprehensively assess short term memory (e.g., narratives, etc..).    2. Improve high level word finding to 80% accuracy  See goal one above.    3. Complete executive function dependent tasks with 80% accuracy or higher.   To address working memory and executive function, patient began deduction puzzle #3. Patient was tasked with placing information from clues into a grid. Patient achieved 100% accuracy (I) (8/8 opportunities). Remainder of puzzle was provided for HEP to facilitate carryover.       Long-term goals:  -Improve cognitive linguistic function.     Progress: During the reporting period ending today, Paulo has shown several improvements with his attention, word finding, and executive functioning skills. He demonstrates improved ability to name abstract items during tasks requiring sustained and divided attention. In most recent sorting activity, patient exhibited a reduction in sorting errors (e.g., 5 reduced to 3). Immediate and delayed recall of lists remain a relative strength for patient (e.g., 6/17/25 - Immediate recall at 87%, Delayed at 100%). Patient displays consistent independent use of internal memory strategies such as categorization, visualization, and association to support task performance. Despite recall strength, Paulo reports difficulty with recalling details in unstructured settings (e.g., conversation, stories, names, etc.) He would continue to benefit from OP ST services to address memory concerns, high-level word finding, and divided attention tasks.     Skilled Intervention Statement  Titrated clueing was used throughout the above mentioned activities. Titrated clueing is a skilled, hierarchical intervention which allows for the patient to arrive at a correct response which would otherwise be outside their current capacity. It is initiated by the therapist and is based on clinical analysis of patient's  response to the activity presented by the therapist. Under this system, the activity is then adjusted slightly in difficulty in order to maximize performance, which, in turn, drives recovery or helps maintain stability. Titrated clueing allows for maximum neuro-cognitive stimulation while permitting the patient a meaningful measure of control over the task.     Other:Patient was provided with home exercises/ activies to target goals in plan of care.   Recommendations:Continue with Plan of Care    Services provided by:    Kimmy Hung M.A., CF-SLP  Temporary License #: [id number]

## 2025-06-17 ENCOUNTER — OFFICE VISIT (OUTPATIENT)
Facility: CLINIC | Age: 40
End: 2025-06-17
Attending: INTERNAL MEDICINE
Payer: MEDICARE

## 2025-06-17 ENCOUNTER — OFFICE VISIT (OUTPATIENT)
Dept: SPEECH THERAPY | Facility: CLINIC | Age: 40
End: 2025-06-17
Attending: INTERNAL MEDICINE
Payer: MEDICARE

## 2025-06-17 DIAGNOSIS — R41.841 COGNITIVE COMMUNICATION DEFICIT: Primary | ICD-10-CM

## 2025-06-17 DIAGNOSIS — R26.89 IMBALANCE: Primary | ICD-10-CM

## 2025-06-17 DIAGNOSIS — I63.9 CEREBROVASCULAR ACCIDENT (CVA), UNSPECIFIED MECHANISM (HCC): ICD-10-CM

## 2025-06-17 DIAGNOSIS — R29.90 STROKE-LIKE SYMPTOM: ICD-10-CM

## 2025-06-17 PROCEDURE — 92507 TX SP LANG VOICE COMM INDIV: CPT

## 2025-06-17 PROCEDURE — 97112 NEUROMUSCULAR REEDUCATION: CPT | Performed by: PHYSICAL THERAPIST

## 2025-06-17 PROCEDURE — 97110 THERAPEUTIC EXERCISES: CPT | Performed by: PHYSICAL THERAPIST

## 2025-06-17 NOTE — ASSESSMENT & PLAN NOTE
"Patient maintained on neurontin 100mg in AM and 2 capsules at bedtime  Seems to be making a difference in terms of symptom control  He is able to sleep at night he states    Lab Results   Component Value Date    HGBA1C 5.5 05/09/2025          Follow up as needed.     I would be happy to see the patient sooner if any new questions/concerns arise.  Patient/Guardian was advised to the call the office if they have any questions and concerns in the meantime.     We discussed \"red flag\" headache symptoms including symptoms such as facial droop on one side, weakness/paralysis on either side, speech trouble, numbness on one side, balance issues, any vision changes, extreme dizziness or significant increase in severity of headache or a sudden onset severe headache, patient is to call 9-1-1 immediately or to proceed to the nearest ER.     "

## 2025-06-17 NOTE — ASSESSMENT & PLAN NOTE
Incidental chronic stroke   Secondary Prevention -   -for medications - recommend continuation of combination of aspirin and atorvastatin  -Blood Pressure goal < 130/80, BP is currently at goal   -LDL goal <70, last LDL is 33 which is at goal   -RACHEAL screening - no snoring   - cardiac workup - none     Therapy needs  Currently in PT, ST and OT twice a week    Counseling/stroke education -   -I advised patient to avoid using NSAIDs for headaches or other pain and to stick to tylenol if needed  -Recommend lifestyle modifications such as mediterranean diet & regular exercise regimen (brisk walking) atleast 4-5 times a week for 20-30 minutes.   -I educated patient/family regarding medication compliance  -encourage smoking cessation, and control of diabetes and hypertension; defer management to primary

## 2025-06-17 NOTE — PROGRESS NOTES
"Daily Note   POC expires Auth Status Total   Visits  Start date  Expiration date PT/OT + Visit Limit? Co-Insurance   25 Auth req after 24v NA NA NA BOMN Yes, $0 co-pay                                                                      Visit/Unit Tracking  AUTH Status:  Date 5/13 5/15 5/20 5/22 5/27 5/29 6/12 6/17       Auth req after 24v   used Used 1 1 1 1 1 1 1 1        Remaining  17 16 15   14 13 12 11 10         Today's date: 2025  Patient name: Mateus Mckeon  : 1985  MRN: [id number]  Referring provider: Dania Sher DO  Dx:   Encounter Diagnosis     ICD-10-CM    1. Imbalance  R26.89       2. Stroke-like symptom  R29.90                              Subjective: Patient presents to PT with no new complaints    Objective: See treatment diary below    NMR/TA: (**gait belt, CG/CS for all activities**)      (1-2 minute seated rest break before ea exercise)  - Standing marching with 1 UE support x 2 min  - Sit to stands x 1 min  - Fwd/bwd over 6\" haydee; 20x with 0 UE, 10x with no UE support; 2# ankle weights  - Lateral over 6\" haydee; 20x with 0 UE, 10x with no UE support; 2# ankle weights  - Seated LAQ 2# ankle weights x 2 min   - Heel toe raises 30x  - Sidestepping w/ GTB around distal thighs: 10ft x 6 laps  - Walking while carrying 5.5# tidal tank 50 ft x 4  - Sit to stands holding 5.5# tidal tank x 10 reps  - Walking facility dog around clinic x 1 lap        Assessment: Patient tolerated treatment well today. Progressing well without UE support for dynamic balance activities. Pt most limited by decreased endurance; may consider circuit training for future sessions. Patient would benefit from continued PT to maximize function and reduce risk for falls.       Plan: Continue per plan of care.           Outcome Measures Initial Eval  24 PN/DC  2024 Initial Evaluation  24 PN  2024 Re-eval  2025 Re-eval  2025 Re-eval  4/3/2025 RE  25   5xSTS 31.25 " sec 13.67 sec   no UE defer 16.33 sec,   no UE 21.03 sec, no UE  13.07 sec, no UE 16.44 sec, no UE 18.00 sec, no UE   TUG 16.3 sec with rollator    23.6 sec no AD 11.48 sec   no AD 18.91 sec w/ rollator     17.16 sec no AD 12.18 sec   no AD 15.72 sec, no AD 10.87 sec, no AD 13.02 sec, no AD 12.00 sec, no AD   10 meter  1.11 m/s self selected pace 0.84 m/s self selected pace  1.05 m/s self selected pace 0.74 m/s 1.14 m/s  0.67 m/s w/ SPC   MEDRANO  49/56 32/56 40/56 40/56 43/56     FGA 12/30 15/30 defer 12/30 11/30 16/30  16/30   6MWT 900 ft 955 ft no AD defer 825 ft no  ft, no  ft, no AD 1,040 ft, no AD defer   mCTSIB  FTEO firm  FTEC firm  FTEO foam  FTEC foam   30 sec  30 sec  30 sec  Unable defer 30 sec  30 sec  30 sec  2 sec 30 sec  30 sec  16 sec  2 sec 30 sec  30 sec  17 sec  0 sec 30 sec  30 sec  8 sec  0 sec 30 sec  30 sec  15 sec  0 sec   ABC  66.88% 62.5% 57.5% 49.38% 60.63% 62.5% defer

## 2025-06-18 ENCOUNTER — TELEPHONE (OUTPATIENT)
Age: 40
End: 2025-06-18

## 2025-06-19 ENCOUNTER — APPOINTMENT (OUTPATIENT)
Dept: SPEECH THERAPY | Facility: CLINIC | Age: 40
End: 2025-06-19
Attending: INTERNAL MEDICINE
Payer: MEDICARE

## 2025-06-20 ENCOUNTER — OFFICE VISIT (OUTPATIENT)
Dept: SPEECH THERAPY | Facility: CLINIC | Age: 40
End: 2025-06-20
Attending: INTERNAL MEDICINE
Payer: MEDICARE

## 2025-06-20 DIAGNOSIS — I63.9 CEREBROVASCULAR ACCIDENT (CVA), UNSPECIFIED MECHANISM (HCC): ICD-10-CM

## 2025-06-20 DIAGNOSIS — R41.841 COGNITIVE COMMUNICATION DEFICIT: Primary | ICD-10-CM

## 2025-06-20 NOTE — PROGRESS NOTES
"Speech Treatment Note    Today's date: 2025  Patient name: Mateus Mckeon  : 1985  MRN: [id number]  Referring provider: Dania Sher DO  Dx:   Encounter Diagnosis     ICD-10-CM    1. Cognitive communication deficit  R41.841       2. Cerebrovascular accident (CVA), unspecified mechanism (HCC)  I63.9                          POC expires Auth Status Total   Visits  Start date  Expiration date PT/OT + Visit Limit? Co-Insurance   25 Medicare  BOMN 3/13/25 N/A No  Yes                                             Visit/Unit Tracking  AUTH Status: BOMN Date               Visits  Authed: AFTER 24 VISTS. Used 1 2 3 4 5 6 7 8 9 10     PN: every 10th visit  Remaining  10 9 8 7 6 5 4 3 2 1            Subjective/Behavioral: Patient arrived on time and attended to all session activities. Patient complained of tooth pain following a tooth extraction . Clinician continued to monitor throughout session. Mid-way through the session patient reported feeling dizzy and requested blood pressure and oxygen level measures to be taken. Patient's vitals were taken by an on-site physical therapist. Blood pressure was at 118/52, heart rate at 78, and oxygen level at 95. By end of session, patient reported feeling less dizzy, yet \"slightly nauseous.\" Patient and patient caregiver was advised that if conditions remain or worsen they should seek immediate medical evaluation to assess and resolve patient presentation. Patient and family member expressed agreement and understanding.       Short-term goals:  1. Patient will complete attention dependent tasks with less than 2 errors per activity or 90% accuracy.   Bill was tasked sorting cards into three groups (e.g., odd numbers, even numbers, face) while simultaneously tracking 4 target cards (e.g.,3-4-7-8) and naming an item in a predetermined category (e.g.,potato chip types and blockbuster movies) when either card was shown. Patient has named \"things that are " "round\" before; however, clinician challenged patient to name conventionally round items. Paulo made 4 attention errors throughout exercise. Patient exhibited difficulty with sorting cards in the odd/even pattern and required clinician cues to readjust. Activity was discontinued due to patient reports of \"dizziness.\"      2. Improve high level word finding to 80% accuracy  Not addressed this session.    3. Complete executive function dependent tasks with 80% accuracy or higher.   Not addressed this session.    Long-term goals:  -Improve cognitive linguistic function.       Skilled Intervention Statement  Titrated clueing was used throughout the above mentioned activities. Titrated clueing is a skilled, hierarchical intervention which allows for the patient to arrive at a correct response which would otherwise be outside their current capacity. It is initiated by the therapist and is based on clinical analysis of patient's response to the activity presented by the therapist. Under this system, the activity is then adjusted slightly in difficulty in order to maximize performance, which, in turn, drives recovery or helps maintain stability. Titrated clueing allows for maximum neuro-cognitive stimulation while permitting the patient a meaningful measure of control over the task.     Other:Patient was provided with home exercises/ activies to target goals in plan of care.   Recommendations:Continue with Plan of Care    Services provided by:    Kimmy Hung M.A., CF-SLP  Temporary License #: [id number]    "

## 2025-06-23 DIAGNOSIS — F41.1 GENERALIZED ANXIETY DISORDER: Chronic | ICD-10-CM

## 2025-06-23 DIAGNOSIS — F51.04 PSYCHOPHYSIOLOGICAL INSOMNIA: ICD-10-CM

## 2025-06-24 RX ORDER — TRAZODONE HYDROCHLORIDE 50 MG/1
50 TABLET ORAL
Qty: 30 TABLET | Refills: 1 | Status: SHIPPED | OUTPATIENT
Start: 2025-06-24

## 2025-06-30 DIAGNOSIS — R42 VERTIGO: ICD-10-CM

## 2025-06-30 NOTE — PROGRESS NOTES
Speech Treatment Note    Today's date: 2025  Patient name: Mateus Mckeon  : 1985  MRN: [id number]  Referring provider: Dania Sher DO  Dx:   Encounter Diagnosis     ICD-10-CM    1. Cognitive communication deficit  R41.841       2. Cerebrovascular accident (CVA), unspecified mechanism (HCC)  I63.9                            POC expires Auth Status Total   Visits  Start date  Expiration date PT/OT + Visit Limit? Co-Insurance   25 Medicare  BOMN 3/13/25 N/A No  Yes                                             Visit/Unit Tracking  AUTH Status: BOMN Date             Visits  Authed: AFTER 24 VISTS. Used 1 2 3 4 5 6 7 8 9 10     PN: every 10th visit  Remaining  10 9 8 7 6 5 4 3 2 1            Subjective/Behavioral: Patient arrived on time and attended to all session activities. Patient did not complain of pain.    Short-term goals:  1. Patient will complete attention dependent tasks with less than 2 errors per activity or 90% accuracy.   Paulo was tasked sorting cards into three groups (e.g., odd numbers, even numbers, face) while simultaneously tracking 4 target cards (e.g.,3-4-7-8) and naming an item in a predetermined category (e.g.,pointy things and 90s sitcoms) when either card was shown. Clinician challenged patient to name conventionally pointy items. Bill made 0 attention errors throughout exercise. Patient exhibited improvement with sorting cards in the odd/even pattern compared to previous sessions. Patient named 16/16 items independently. Immediate recall was at 81% accuracy (I).     2. Improve high level word finding to 80% accuracy  Not addressed this session.    3. Complete executive function dependent tasks with 80% accuracy or higher.   To address executive function and working memory, patient completed a deductive reasoning task (e.g., Eliezer's Closet). He was tasked with filling in a grid based off of information contained in a list of randomized clues. Accuracy was  at 40% (I), which improved to 50% accuracy following min semantic cues. Consider repeating same exercise with clue modifications titrated down.    Long-term goals:  -Improve cognitive linguistic function.       Skilled Intervention Statement  Titrated clueing was used throughout the above mentioned activities. Titrated clueing is a skilled, hierarchical intervention which allows for the patient to arrive at a correct response which would otherwise be outside their current capacity. It is initiated by the therapist and is based on clinical analysis of patient's response to the activity presented by the therapist. Under this system, the activity is then adjusted slightly in difficulty in order to maximize performance, which, in turn, drives recovery or helps maintain stability. Titrated clueing allows for maximum neuro-cognitive stimulation while permitting the patient a meaningful measure of control over the task.     Other:Patient was provided with home exercises/ activies to target goals in plan of care.   Recommendations:Continue with Plan of Care    Services provided by:    Kimmy Hung M.A., CF-SLP  Temporary License #: [id number]

## 2025-06-30 NOTE — TELEPHONE ENCOUNTER
Reason for call:   [x] Refill   [] Prior Auth  [] Other:     Office:   [x] PCP/Provider - [facility]  Authorized By: Dania Sher DO    [] Specialty/Provider -     Medication: ondansetron (ZOFRAN) 4 mg tablet    Dose/Frequency: Take 1 tablet (4 mg total) by mouth every 8 (eight) hours as needed for nausea or vomiting    Quantity: 90    Pharmacy: [address] [place] [facility]   Does the patient have enough for 3 days?   [x] Yes   [] No - Send as HP to POD    Mail Away Pharmacy   Does the patient have enough for 10 days?   [] Yes   [] No - Send as HP to POD

## 2025-07-01 ENCOUNTER — OFFICE VISIT (OUTPATIENT)
Dept: SPEECH THERAPY | Facility: CLINIC | Age: 40
End: 2025-07-01
Attending: INTERNAL MEDICINE
Payer: MEDICARE

## 2025-07-01 ENCOUNTER — HOSPITAL ENCOUNTER (EMERGENCY)
Facility: HOSPITAL | Age: 40
Discharge: HOME | End: 2025-07-01
Attending: EMERGENCY MEDICINE
Payer: MEDICARE

## 2025-07-01 ENCOUNTER — APPOINTMENT (EMERGENCY)
Dept: RADIOLOGY | Facility: HOSPITAL | Age: 40
End: 2025-07-01
Payer: MEDICARE

## 2025-07-01 VITALS
RESPIRATION RATE: 18 BRPM | TEMPERATURE: 98.2 F | SYSTOLIC BLOOD PRESSURE: 170 MMHG | OXYGEN SATURATION: 100 % | HEART RATE: 75 BPM | DIASTOLIC BLOOD PRESSURE: 83 MMHG

## 2025-07-01 DIAGNOSIS — S62.308A: Primary | ICD-10-CM

## 2025-07-01 DIAGNOSIS — I63.9 CEREBROVASCULAR ACCIDENT (CVA), UNSPECIFIED MECHANISM (HCC): ICD-10-CM

## 2025-07-01 DIAGNOSIS — R41.841 COGNITIVE COMMUNICATION DEFICIT: Primary | ICD-10-CM

## 2025-07-01 DIAGNOSIS — M25.511 RIGHT SHOULDER PAIN: ICD-10-CM

## 2025-07-01 PROCEDURE — 73000 X-RAY EXAM OF COLLAR BONE: CPT

## 2025-07-01 PROCEDURE — 99283 EMERGENCY DEPT VISIT LOW MDM: CPT

## 2025-07-01 PROCEDURE — 29125 APPL SHORT ARM SPLINT STATIC: CPT

## 2025-07-01 PROCEDURE — 92507 TX SP LANG VOICE COMM INDIV: CPT

## 2025-07-01 PROCEDURE — 73110 X-RAY EXAM OF WRIST: CPT

## 2025-07-01 PROCEDURE — 99285 EMERGENCY DEPT VISIT HI MDM: CPT

## 2025-07-01 PROCEDURE — 73130 X-RAY EXAM OF HAND: CPT

## 2025-07-01 PROCEDURE — 73030 X-RAY EXAM OF SHOULDER: CPT

## 2025-07-01 RX ORDER — OXYCODONE HYDROCHLORIDE 5 MG/1
5 TABLET ORAL ONCE
Refills: 0 | Status: COMPLETED | OUTPATIENT
Start: 2025-07-01 | End: 2025-07-01

## 2025-07-01 RX ORDER — OXYCODONE HYDROCHLORIDE 5 MG/1
5 TABLET ORAL EVERY 8 HOURS PRN
Qty: 16 TABLET | Refills: 0 | Status: SHIPPED | OUTPATIENT
Start: 2025-07-01 | End: 2025-07-15

## 2025-07-01 RX ORDER — ACETAMINOPHEN 325 MG/1
650 TABLET ORAL ONCE
Status: COMPLETED | OUTPATIENT
Start: 2025-07-01 | End: 2025-07-01

## 2025-07-01 RX ADMIN — ACETAMINOPHEN 650 MG: 325 TABLET ORAL at 19:51

## 2025-07-01 RX ADMIN — OXYCODONE HYDROCHLORIDE 5 MG: 5 TABLET ORAL at 21:15

## 2025-07-02 ENCOUNTER — PATIENT OUTREACH (OUTPATIENT)
Dept: CASE MANAGEMENT | Facility: OTHER | Age: 40
End: 2025-07-02

## 2025-07-02 RX ORDER — ONDANSETRON 4 MG/1
4 TABLET, FILM COATED ORAL EVERY 8 HOURS PRN
Qty: 90 TABLET | Refills: 2 | Status: SHIPPED | OUTPATIENT
Start: 2025-07-02

## 2025-07-02 NOTE — DISCHARGE INSTRUCTIONS
Tylenol 650 mg every 6 hours as needed for pain.  Oxycodone as prescribed for severe/breakthrough pain.  Keep splint on and keep it dry until following up with orthopedics.   Follow up with orthopedics.  Follow up with PCP.  Return to ER with worsening pain, swelling, fevers, or any other concerning symptoms.

## 2025-07-02 NOTE — PROGRESS NOTES
Speech Treatment Note    Today's date: 7/3/2025  Patient name: Mateus Mckeon  : 1985  MRN: [id number]  Referring provider: Dania Sher DO  Dx:   Encounter Diagnosis     ICD-10-CM    1. Cognitive communication deficit  R41.841       2. Cerebrovascular accident (CVA), unspecified mechanism (HCC)  I63.9                              POC expires Auth Status Total   Visits  Start date  Expiration date PT/OT + Visit Limit? Co-Insurance   25 Medicare  BOMN 3/13/25 N/A No  Yes                                             Visit/Unit Tracking  AUTH Status: BOMN Date 6/20 6/24 7/1 7/3           Visits  Authed: AFTER 24 VISTS. Used 1 2 3 4 5 6 7 8 9 10     PN: every 10th visit  Remaining  10 9 8 7 6 5 4 3 2 1            Subjective/Behavioral: Patient arrived on time and attended to all session activities. Patient reported feeling pain in hand and rated it at a 6 on a 10 point scale. Clinician continued to monitor throughout the session. Clinician asked patient to complete a previously assigned and incomplete HEP this week.    Short-term goals:  1. Patient will complete attention dependent tasks with less than 2 errors per activity or 90% accuracy.   Bill was tasked sorting cards into three groups (e.g., odd numbers, even numbers, face) while simultaneously tracking 4 target cards (e.g.,2-7-4-9) and naming an item in a predetermined category (e.g.,grocery store item and [employer] quarterbacks) when either card was shown. Bill made 2-3 attention errors throughout exercise (e.g., sorting odd and even pattern and recalling target number pairs). Patient named 16/16 items independently. Immediate recall was at 87% accuracy (I). Patient reported using an association strategy (e.g., naming ingredients in a salad assisted in recall ability). Delayed recall was at 93% accuracy (I). Titrate up next session.    2. Improve high level word finding to 80% accuracy  Not addressed this session.    3. Complete executive  function dependent tasks with 80% accuracy or higher.   To address executive function and working memory, patient was asked to repeat a list of three randomized words in sequential order (e.g., cocoon, caterpillar, butterfly -->caterpillar, cocoon, butterfly). Words were read aloud by clinician, no visual support was provided. Patient achieved 95% accuracy (I). Titrate up in subsequent sessions.      Long-term goals:  -Improve cognitive linguistic function.       Skilled Intervention Statement  Titrated clueing was used throughout the above mentioned activities. Titrated clueing is a skilled, hierarchical intervention which allows for the patient to arrive at a correct response which would otherwise be outside their current capacity. It is initiated by the therapist and is based on clinical analysis of patient's response to the activity presented by the therapist. Under this system, the activity is then adjusted slightly in difficulty in order to maximize performance, which, in turn, drives recovery or helps maintain stability. Titrated clueing allows for maximum neuro-cognitive stimulation while permitting the patient a meaningful measure of control over the task.     Other:Patient was provided with home exercises/ activies to target goals in plan of care.   Recommendations:Continue with Plan of Care    Services provided by:    Kimmy Hung M.A., CF-SLP  Temporary License #: [id number]

## 2025-07-02 NOTE — ED PROVIDER NOTES
Time reflects when diagnosis was documented in both MDM as applicable and the Disposition within this note       Time User Action Codes Description Comment    7/1/2025  9:04 PM Mehdi Wilburn Add [S62.308A] Closed fracture of fifth metacarpal bone     7/1/2025  9:04 PM Mehdi Wilburn Add [M25.511] Right shoulder pain           ED Disposition       ED Disposition   Discharge    Condition   Stable    Date/Time   Tue Jul 1, 2025  9:03 PM    Comment   Mateus Mckeon discharge to home/self care.                   Assessment & Plan       Medical Decision Making  39 year old male with a PMH of CVA, ESRD, HTN presenting with right hand pain after punching a wall around 11am. Patient states he was frustrated and hit the wall. States he also has pain in his right shoulder. He has had limited ROM of his right hand since the injury. He last took tylenol around 1pm. Denies other injuries. On exam, patient resting comfortably. Edema noted over right 5th metacarpal. Limited ROM of right wrist secondary to pain. No obvious deformity. Normal ROM of right shoulder. Mild tenderness to posterior R shoulder, no obvious deformity. Neurovascularly intact.     DDX including but not limited to: boxer's fracture, dislocation, strain,     XR shows closed R 5th metacarpal fracture, possible closed R 4th metacarpal fracture. XR R shoulder and R clavicle NAD per my read in ED. See procedure note. Patient given tylenol and oxycodone for pain. Recommended tylenol for pain, prescribed few doses of oxycodone for severe breakthrough pain. Discussed care for splint with patient. Referred patient to orthopedics. Prior to discharge, discharge instructions were discussed with patient at bedside. Patient was provided both verbal and written instructions. Patient is understanding of the discharge instructions and is agreeable to plan of care. Return precautions were discussed with patient bedside, patient verbalized understanding of signs and symptoms  that would necessitate return to the ED. All questions were answered. Patient was comfortable with the plan of care and discharged to home.         Problems Addressed:  Closed fracture of fifth metacarpal bone: acute illness or injury  Right shoulder pain: acute illness or injury    Amount and/or Complexity of Data Reviewed  Radiology: ordered and independent interpretation performed.    Risk  OTC drugs.  Prescription drug management.             Medications   acetaminophen (TYLENOL) tablet 650 mg (650 mg Oral Given 7/1/25 1951)   oxyCODONE (ROXICODONE) IR tablet 5 mg (5 mg Oral Given 7/1/25 2115)       ED Risk Strat Scores                    No data recorded                            History of Present Illness       Chief Complaint   Patient presents with    Hand Injury     Reports punching a wall out of frustration around 11am, reports right hand pain and right shoulder pain. Last tylenol at 1pm       Past Medical History[1]   Past Surgical History[2]   Family History[3]   Social History[4]   E-Cigarette/Vaping    E-Cigarette Use Current Every Day User     Comments medical marijuana       E-Cigarette/Vaping Substances    Nicotine No     THC Yes     CBD Yes     Flavoring No     Other No     Unknown No       I have reviewed and agree with the history as documented.     39 year old male with a PMH of CVA, ESRD, HTN presenting with right hand pain after punching a wall around 11am. Patient states he was frustrated and hit the wall. States he also has pain in his right shoulder. He has had limited ROM of his right hand since the injury. He last took tylenol around 1pm. Denies other injuries.      Hand Injury      Review of Systems   Respiratory:  Negative for shortness of breath.    Cardiovascular:  Negative for chest pain.   Skin:  Positive for wound (right hand).   Neurological:  Negative for weakness and numbness.   Psychiatric/Behavioral: Negative.             Objective       ED Triage Vitals   Temperature Pulse  Blood Pressure Respirations SpO2 Patient Position - Orthostatic VS   07/01/25 1927 07/01/25 1927 07/01/25 1927 07/01/25 1927 07/01/25 1927 07/01/25 1927   98.2 °F (36.8 °C) 75 (!) 214/95 18 99 % Lying      Temp Source Heart Rate Source BP Location FiO2 (%) Pain Score    07/01/25 1927 07/01/25 1927 07/01/25 1927 -- 07/01/25 1951    Oral Monitor Right arm  7      Vitals      Date and Time Temp Pulse SpO2 Resp BP Pain Score FACES Pain Rating User   07/01/25 2115 -- -- -- -- -- 7 -- KD   07/01/25 2103 -- -- -- -- 170/83 -- -- IM   07/01/25 1951 -- -- -- -- -- 7 -- KB   07/01/25 1933 -- 75 100 % 18 195/83 -- -- SH   07/01/25 1927 98.2 °F (36.8 °C) 75 99 % 18 214/95 -- -- JF            Physical Exam  Vitals reviewed.   Constitutional:       General: He is not in acute distress.     Appearance: Normal appearance.   HENT:      Head: Normocephalic and atraumatic.      Nose: Nose normal.      Mouth/Throat:      Mouth: Mucous membranes are moist.      Pharynx: Oropharynx is clear.     Eyes:      Extraocular Movements: Extraocular movements intact.      Conjunctiva/sclera: Conjunctivae normal.       Cardiovascular:      Rate and Rhythm: Normal rate and regular rhythm.      Pulses: Normal pulses.      Heart sounds: Normal heart sounds.   Pulmonary:      Effort: Pulmonary effort is normal.      Breath sounds: Normal breath sounds.   Abdominal:      General: Abdomen is flat. Bowel sounds are normal.      Palpations: Abdomen is soft.     Musculoskeletal:         General: Normal range of motion.      Comments: Edema noted over right 5th metacarpal. Limited ROM of right wrist secondary to pain. No obvious deformity. Normal ROM of right shoulder. Mild tenderness to posterior R shoulder, no obvious deformity. Neurovascularly intact.      Skin:     General: Skin is warm and dry.      Capillary Refill: Capillary refill takes less than 2 seconds.     Neurological:      General: No focal deficit present.      Mental Status: He is alert and  oriented to person, place, and time. Mental status is at baseline.     Psychiatric:         Mood and Affect: Mood normal.         Behavior: Behavior normal.         Thought Content: Thought content normal.         Results Reviewed       None            XR hand 3+ views RIGHT   ED Interpretation by Mehdi Wilburn PA-C (07/01 2027)   5th metacarpal fx, possible 4th metacarpal fx      XR wrist 3+ views RIGHT   ED Interpretation by Mehdi Wilburn PA-C (07/01 2027)   5th metacarpal fx, possible 4th metacarpal fx.       XR clavicle RIGHT   ED Interpretation by Mehdi Wilburn PA-C (07/01 2028)   No acute osseous abnormalities per my read in ED.       XR shoulder 2+ views RIGHT   ED Interpretation by Mehdi Wilburn PA-C (07/01 2028)   No acute osseous abnormalities per my read in ED.           Splint application    Date/Time: 7/1/2025 8:50 PM    Performed by: Mehdi Wilburn PA-C  Authorized by: Mehdi Wilburn PA-C    Other Assisting Provider: No    Verbal consent obtained?: Yes    Risks and benefits: Risks, benefits and alternatives were discussed    Consent given by:  Patient  Patient states understanding of procedure being performed: Yes    Patient identity confirmed:  Verbally with patient  Pre-procedure details:     Sensation:  Normal    Skin color:  Pink  Procedure details:     Laterality:  Right    Location:  Hand    Hand:  R hand    Splint composition: static      Splint type:  Ulnar gutter (static)    Supplies:  Cotton padding, elastic bandage and Ortho-Glass  Post-procedure details:     Pain:  Improved    Sensation:  Normal    Skin color:  Pink    Patient tolerance of procedure:  Tolerated well, no immediate complications  Comments:      Static ulnar gutter applied to R hand/wrist. Neurovascularly intact before and after application. No immediate complications.       ED Medication and Procedure Management   Prior to Admission Medications   Prescriptions Last Dose Informant Patient Reported? Taking?   GLUCAGON EMERGENCY 1  MG injection  Self, Family Member Yes No   Gvoke HypoPen 1-Pack 1 MG/0.2ML SOAJ  Self, Family Member Yes No   Sig: as needed   Insulin Disposable Pump (Omnipod 5 G6 Intro, Gen 5,) KIT  Self, Family Member Yes No   Insulin Disposable Pump (Omnipod 5 G6 Pod, Gen 5,) MISC  Self, Family Member Yes No   NIFEdipine (PROCARDIA XL) 90 mg 24 hr tablet  Self, Family Member No No   Sig: Take 1 tablet (90 mg total) by mouth 2 (two) times a day   NovoLOG 100 UNIT/ML injection  Self, Family Member Yes No   aspirin (ECOTRIN LOW STRENGTH) 81 mg EC tablet  Self, Family Member Yes No   Sig: Take 81 mg by mouth in the morning.   atorvastatin (LIPITOR) 40 mg tablet  Self, Family Member No No   Sig: TAKE 1 TABLET BY MOUTH ONCE DAILY IN THE EVENING   b complex vitamins capsule  Self, Family Member Yes No   Sig: Take 1 capsule by mouth daily before lunch   calcium acetate (PHOSLO) capsule  Self, Family Member No No   Sig: Take 1 capsule (667 mg total) by mouth 3 (three) times a day   cinacalcet (SENSIPAR) 60 MG tablet  Self, Family Member No No   Sig: Take 1 tablet (60 mg total) by mouth daily   cloNIDine (CATAPRES) 0.2 mg tablet  Self, Family Member No No   Sig: Take 1 tablet (0.2 mg total) by mouth every 8 (eight) hours   escitalopram (LEXAPRO) 20 mg tablet   No No   Sig: Take 1 tablet (20 mg total) by mouth daily   gabapentin (NEURONTIN) 100 mg capsule   No No   Sig: TAKE 1 CAPSULE BY MOUTH IN THE MORNING AND 2 CAPSULES AT BEDTIME   hydrALAZINE (APRESOLINE) 100 MG tablet  Self, Family Member No No   Sig: Take 1 tablet (100 mg total) by mouth every 8 (eight) hours   hydrOXYzine HCL (ATARAX) 10 mg tablet  Self, Family Member No No   Sig: Take 1 tablet (10 mg total) by mouth every 6 (six) hours as needed for itching or anxiety   Patient not taking: Reported on 6/12/2025   labetalol (NORMODYNE) 200 mg tablet  Self, Family Member No No   Sig: Take 2 tablets (400 mg total) by mouth 3 (three) times a day   olmesartan (BENICAR) 40 mg tablet   Self, Family Member Yes No   Sig: Take 40 mg by mouth in the morning.   torsemide (DEMADEX) 100 mg tablet  Self, Family Member No No   Sig: Take 1 tablet (100 mg total) by mouth daily Do not start before January 8, 2025.   traZODone (DESYREL) 50 mg tablet   No No   Sig: TAKE 1 TABLET BY MOUTH ONCE DAILY AT BEDTIME AS NEEDED FOR SLEEP      Facility-Administered Medications: None     Discharge Medication List as of 7/1/2025  9:11 PM        START taking these medications    Details   oxyCODONE (ROXICODONE) 5 immediate release tablet Take 1 tablet (5 mg total) by mouth every 8 (eight) hours as needed for moderate pain for up to 16 doses Max Daily Amount: 15 mg, Starting Tue 7/1/2025, Normal           CONTINUE these medications which have NOT CHANGED    Details   aspirin (ECOTRIN LOW STRENGTH) 81 mg EC tablet Take 81 mg by mouth in the morning., Historical Med      atorvastatin (LIPITOR) 40 mg tablet TAKE 1 TABLET BY MOUTH ONCE DAILY IN THE EVENING, Starting Fri 2/28/2025, Normal      b complex vitamins capsule Take 1 capsule by mouth daily before lunch, Historical Med      calcium acetate (PHOSLO) capsule Take 1 capsule (667 mg total) by mouth 3 (three) times a day, Starting Tue 1/23/2024, Normal      cinacalcet (SENSIPAR) 60 MG tablet Take 1 tablet (60 mg total) by mouth daily, Starting Tue 1/23/2024, Normal      cloNIDine (CATAPRES) 0.2 mg tablet Take 1 tablet (0.2 mg total) by mouth every 8 (eight) hours, Starting Mon 1/13/2025, Normal      escitalopram (LEXAPRO) 20 mg tablet Take 1 tablet (20 mg total) by mouth daily, Starting Thu 5/1/2025, Normal      gabapentin (NEURONTIN) 100 mg capsule TAKE 1 CAPSULE BY MOUTH IN THE MORNING AND 2 CAPSULES AT BEDTIME, Normal      GLUCAGON EMERGENCY 1 MG injection Historical Med      Gvoke HypoPen 1-Pack 1 MG/0.2ML SOAJ as needed, Starting Wed 8/10/2022, Historical Med      hydrALAZINE (APRESOLINE) 100 MG tablet Take 1 tablet (100 mg total) by mouth every 8 (eight) hours, Starting  Tue 1/7/2025, Until Thu 6/12/2025, Normal      hydrOXYzine HCL (ATARAX) 10 mg tablet Take 1 tablet (10 mg total) by mouth every 6 (six) hours as needed for itching or anxiety, Starting Wed 12/7/2022, Normal      Insulin Disposable Pump (Omnipod 5 G6 Intro, Gen 5,) KIT Starting Tue 1/17/2023, Historical Med      Insulin Disposable Pump (Omnipod 5 G6 Pod, Gen 5,) MISC Starting Fri 3/24/2023, Historical Med      labetalol (NORMODYNE) 200 mg tablet Take 2 tablets (400 mg total) by mouth 3 (three) times a day, Starting Tue 1/7/2025, Normal      NIFEdipine (PROCARDIA XL) 90 mg 24 hr tablet Take 1 tablet (90 mg total) by mouth 2 (two) times a day, Starting Mon 1/13/2025, Normal      NovoLOG 100 UNIT/ML injection Historical Med      olmesartan (BENICAR) 40 mg tablet Take 40 mg by mouth in the morning., Historical Med      torsemide (DEMADEX) 100 mg tablet Take 1 tablet (100 mg total) by mouth daily Do not start before January 8, 2025., Starting Wed 1/8/2025, Normal      traZODone (DESYREL) 50 mg tablet TAKE 1 TABLET BY MOUTH ONCE DAILY AT BEDTIME AS NEEDED FOR SLEEP, Starting Tue 6/24/2025, Normal      ondansetron (ZOFRAN) 4 mg tablet Take 1 tablet (4 mg total) by mouth every 8 (eight) hours as needed for nausea or vomiting, Starting Fri 6/6/2025, Normal             ED SEPSIS DOCUMENTATION   Time reflects when diagnosis was documented in both MDM as applicable and the Disposition within this note       Time User Action Codes Description Comment    7/1/2025  9:04 PM Mehdi Wilburn Add [S62.308A] Closed fracture of fifth metacarpal bone     7/1/2025  9:04 PM Mehdi Wilburn Add [M25.511] Right shoulder pain                    [1]   Past Medical History:  Diagnosis Date    Acute kidney injury (HCC)     Ambulates with cane     Anuria     Anxiety     Cellulitis of right elbow 03/31/2021    Chronic kidney disease     COVID-19 10/18/2024    Depression     Diabetes mellitus (HCC)     Diarrhea     Emesis 10/24/2020    End stage renal  disease (HCC) 02/11/2018    Formatting of this note might be different from the original. Last Assessment & Plan:  Secondary to DM.  On nightly PD.  Followed by [facility].  Patient considering transplant for kidney and pancreas through [facility] Formatting of this note might be different from the original. Last Assessment & Plan:  Formatting of this note might be different from the original. 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    Esophagitis 07/21/2015    Falls     Gastroparesis     GERD (gastroesophageal reflux disease)     History of shingles 2010    History of transfusion 02/2018    no adverse reaction    Hyperlipidemia     Hyperphosphatemia     Hypertension     Hypoglycemia 07/15/2022    Itching     Mastoiditis of right side 07/15/2022    Muscle weakness     general unsteadiness    Obesity (BMI 30.0-34.9) 09/09/2019    Orthostatic hypotension 10/25/2020    Peripheral polyneuropathy 11/20/2019    PONV (postoperative nausea and vomiting) 01/26/2018    Protein-calorie malnutrition (HCC) 11/23/2020    Recurrent peritonitis (HCC) due to peritoneal dialysis catheter 07/31/2020    Retinopathy     Seizures (HCC)     early 2020 - one time    Skin abnormality     some dime size areas where skin was scratched from itching    Sleep apnea     Spontaneous bacterial peritonitis (HCC) 10/19/2020    Squamous cell skin cancer     left temple    Stroke (HCC)     x2 - off balance/no driving/fatigue    Swelling of both lower extremities     Swelling of joint of upper arm, right 04/03/2024    Traumatic onycholysis 07/21/2022    Vomiting     Wears glasses     Word finding difficulty 11/05/2024   [2]   Past Surgical History:  Procedure Laterality Date    CARDIAC ELECTROPHYSIOLOGY PROCEDURE N/A 9/21/2023    Procedure: Cardiac loop recorder explant;  Surgeon: Parish Morgan MD;  Location: [facility];  Service: Cardiology    CARDIAC LOOP RECORDER  05/2018    COLONOSCOPY      EGD      EYE  SURGERY Right     HEMODIALYSIS ADULT  11/6/2024    IR AV FISTULAGRAM/GRAFTOGRAM  02/23/2021    IR CEREBRAL ANGIOGRAPHY  1/12/2024    IR CEREBRAL ANGIOGRAPHY / INTERVENTION  1/5/2024    IR TUNNELED CENTRAL LINE PLACEMENT  02/16/2021    IR TUNNELED DIALYSIS CATHETER PLACEMENT  11/18/2020    IR TUNNELED DIALYSIS CATHETER REMOVAL  02/12/2021    IR TUNNELED DIALYSIS CATHETER REMOVAL  03/11/2021    MOHS SURGERY Left 12/14/2022    Left temple with Dr. Hassan    PERITONEAL CATHETER INSERTION N/A 08/27/2018    Procedure: UNROOF PD CATHETER;  Surgeon: Felipe Lindo DO;  Location: [facility];  Service: General    KS ARTERIOVENOUS ANASTOMOSIS OPEN DIRECT Left 11/09/2020    Procedure: CREATION FISTULA  ARTERIOVENOUS (AV) - LEFT WRIST;  Surgeon: Placido Altamirano MD;  Location: [facility];  Service: Vascular    KS ESOPHAGOGASTRODUODENOSCOPY TRANSORAL DIAGNOSTIC N/A 04/18/2019    Procedure: ESOPHAGOGASTRODUODENOSCOPY (EGD);  Surgeon: Ale Figueroa MD;  Location: [facility];  Service: Gastroenterology    KS LAPS INSERTION TUNNELED INTRAPERITONEAL CATHETER N/A 08/06/2018    Procedure: LAPAROSCOPIC PD CATHETER PLACEMENT;  Surgeon: Felipe Lindo DO;  Location[facility];  Service: General    KS REMOVAL TUNNELED INTRAPERITONEAL CATHETER N/A 11/18/2020    Procedure: REMOVAL CATHETER PERITONEAL DIALYSIS;  Surgeon: Abdifatah Ty MD;  Location[facility];  Service: General    TONSILLECTOMY      UPPER GASTROINTESTINAL ENDOSCOPY     [3]   Family History  Problem Relation Name Age of Onset    Breast cancer Mother      Hypertension Mother      Hyperlipidemia Father      Hypertension Father      Leukemia Maternal Grandmother      Hyperlipidemia Maternal Grandfather      Hypertension Maternal Grandfather      Hyperlipidemia Paternal Grandmother      Hypertension Paternal Grandmother      Heart disease Paternal Grandfather          cardiac disorder    Diabetes Paternal Grandfather     [4]   Social History  Tobacco Use    Smoking status: Former      Current packs/day: 0.00     Average packs/day: 0.5 packs/day for 12.0 years (6.0 ttl pk-yrs)     Types: Cigarettes     Start date: 2006     Quit date: 2018     Years since quittin.4    Smokeless tobacco: Never    Tobacco comments:     quit 2018   Vaping Use    Vaping status: Every Day    Substances: THC, CBD   Substance Use Topics    Alcohol use: Not Currently    Drug use: Yes     Types: Marijuana     Comment: medical marijuana last vaped 2024        Mehdi Wilburn PA-C  25 [id number]

## 2025-07-02 NOTE — PROGRESS NOTES
Outpatient Care Management Note:  Referral for care management received after recent ED visit.  Chart review completed.     History includes renovascular HTN, CAD, CVA, thromboembolic disorder, RACHEAL, GERD, gastroparesis, DM wit recent A1C of 5.5, hypothyroid, hyperparathyroidism, subarachnoid hemorrhage, binocular vision disorder, ESRD on HD, depression and anxiety.     7/1/2025-Presented to ED with right hand pain after hitting a wall in frustration. Imaging shows possible closed (R) 5th metacarpal fracture.  Referred to orthopedics.  Discharged with splint and a prescription for oxycodone.     Outreach call attempted to Susan Linda.  Message left for patient to please return call.  Contact information left on message.     Second call reminder sent.          unknown

## 2025-07-03 ENCOUNTER — OFFICE VISIT (OUTPATIENT)
Dept: SPEECH THERAPY | Facility: CLINIC | Age: 40
End: 2025-07-03
Attending: INTERNAL MEDICINE
Payer: MEDICARE

## 2025-07-03 DIAGNOSIS — I63.9 CEREBROVASCULAR ACCIDENT (CVA), UNSPECIFIED MECHANISM (HCC): ICD-10-CM

## 2025-07-03 DIAGNOSIS — R41.841 COGNITIVE COMMUNICATION DEFICIT: Primary | ICD-10-CM

## 2025-07-03 PROCEDURE — 92507 TX SP LANG VOICE COMM INDIV: CPT

## 2025-07-08 ENCOUNTER — APPOINTMENT (OUTPATIENT)
Dept: SPEECH THERAPY | Facility: CLINIC | Age: 40
End: 2025-07-08
Attending: INTERNAL MEDICINE
Payer: MEDICARE

## 2025-07-08 ENCOUNTER — PATIENT OUTREACH (OUTPATIENT)
Dept: CASE MANAGEMENT | Facility: OTHER | Age: 40
End: 2025-07-08

## 2025-07-08 NOTE — PROGRESS NOTES
Outpatient Care Management Note:  Outreach call placed to Mr. Mckeon.  Introduced myself and my role.      Mr. Mckeon states he does continue to have some pain in his right hand.  He is using Tylenol and Oxycodone rarely.  He does have a follow up appointment with orthopedics tomorrow.   He is attending his HD three times weekly.  He currently feels he has no needs and does not feel outreach is needed.

## 2025-07-09 ENCOUNTER — OFFICE VISIT (OUTPATIENT)
Dept: OBGYN CLINIC | Facility: CLINIC | Age: 40
End: 2025-07-09
Payer: MEDICARE

## 2025-07-09 ENCOUNTER — OFFICE VISIT (OUTPATIENT)
Dept: OCCUPATIONAL THERAPY | Facility: CLINIC | Age: 40
End: 2025-07-09
Attending: PHYSICIAN ASSISTANT
Payer: MEDICARE

## 2025-07-09 VITALS — WEIGHT: 195 LBS | HEIGHT: 71 IN | BODY MASS INDEX: 27.3 KG/M2

## 2025-07-09 DIAGNOSIS — S62.336A CLOSED DISPLACED FRACTURE OF NECK OF FIFTH METACARPAL BONE OF RIGHT HAND, INITIAL ENCOUNTER: Primary | ICD-10-CM

## 2025-07-09 PROCEDURE — 99203 OFFICE O/P NEW LOW 30 MIN: CPT | Performed by: SURGERY

## 2025-07-09 PROCEDURE — L3933 FO W/O JOINTS CF: HCPCS

## 2025-07-09 NOTE — PROGRESS NOTES
Mary Modi M.D.  Attending, Orthopaedic Surgery  Hand, Wrist, and Elbow Surgery  [facility]      ORTHOPAEDIC HAND, WRIST, AND ELBOW OFFICE  VISIT       ASSESSMENT/PLAN:        Assessment & Plan  Closed displaced fracture of neck of fifth metacarpal bone of right hand, initial encounter  X-rays reviewed demonstrating a slightly angulated fifth metacarpal neck fracture  Discussed with the patient that this injury is amenable to nonoperative management in a splint, no acute surgical intervention necessary.  Given patient's history of stroke and preferred use of a cane for ambulation decision was made to refer him to OT for a custom hand-based ulnar gutter splint which he will wear for the next 5 weeks until his fracture heals  Splint may be removed for hygiene purposes and gentle range of motion only, otherwise should be worn 95% of the time  Follow-up for repeat x-rays and evaluation in about 2 weeks    Orders:    Ambulatory Referral to Orthopedic Surgery    Ambulatory Referral to PT/OT Hand Therapy; Future             The patient verbalized understanding of exam findings and treatment plan. We engaged in the shared decision-making process and treatment options were discussed at length with the patient. Surgical and conservative management discussed today along with risks and benefits.      Follow Up:  Return in about 2 weeks (around 7/23/2025) for Recheck.    To Do Next Visit:  Re-evaluation of current issue and X-rays of the  right  hand      General Discussions:  Fracture - Nonoperative Care: The physiology of a fractured bone was discussed with the patient today.  With non-displaced or minimally displaced fractures, conservative treatment such as casting or splinting often results in a functional recovery.  Typically, these fractures are immobilized in either a cast or splint depending on the pattern.  Radiographs are typically taken at intervals throughout the fracture healing to  ensure that reduction or alignment is not lost.  If the fracture loses its alignment, surgical intervention may be required to stabilize it.  Medical conditions such as diabetes, osteoporosis, vitamin D deficiency, and a history of or exposure to smoking may delay or prevent fracture healing. Options between cast/splint immobilization and surgical treatment were offered and the risks and benefits of both were discussed.     ____________________________________________________________________________________________________________________________________________      CHIEF COMPLAINT:  Chief Complaint   Patient presents with    Right Hand - Pain     Patient punched a wall and fractures his hand        SUBJECTIVE:  Mateus Mckeon is a 39 y.o. year old THD male who presents for evaluation of a right 5th MC fracture. Injury was sustained about a week ago after punching a wall. Patient was seen and splinted in the ED and has been in his splint since. He has some pain at the fracture site but is otherwise doing well. Patient has medical hx significant for type 1 DM with neuropathy, CKD5 on dialysis, and hx of cerebellar strokes. He walks with a cane most of the time      I have personally reviewed all the relevant PMH, PSH, SH, FH, Medications and allergies      PAST MEDICAL HISTORY:  Past Medical History[1]    PAST SURGICAL HISTORY:  Past Surgical History[2]    FAMILY HISTORY:  Family History[3]    SOCIAL HISTORY:  Social History[4]    MEDICATIONS:  Current Medications[5]    ALLERGIES:  Allergies[6]        REVIEW OF SYSTEMS:  Review of Systems   Constitutional:  Negative for chills and fever.   HENT:  Negative for ear pain and sore throat.    Eyes:  Negative for pain and visual disturbance.   Respiratory:  Negative for cough and shortness of breath.    Cardiovascular:  Negative for chest pain and palpitations.   Gastrointestinal:  Negative for abdominal pain and vomiting.   Genitourinary:  Negative for dysuria and  hematuria.   Musculoskeletal:  Positive for arthralgias. Negative for back pain.   Skin:  Negative for color change and rash.   Neurological:  Negative for seizures and syncope.   All other systems reviewed and are negative.      VITALS:  There were no vitals filed for this visit.    LABS:      _____________________________________________________  PHYSICAL EXAMINATION:  General: well developed and well nourished, alert, oriented times 3, and appears comfortable  Psychiatric: Normal  HEENT: Normocephalic, Atraumatic Trachea Midline, No torticollis  Pulmonary: No audible wheezing or respiratory distress   Abdomen/GI: Non tender, non distended   Cardiovascular: No pitting edema, 2+ radial pulse   Skin: No masses, erythema, lacerations, fluctation, ulcerations  Neurovascular: Sensation Intact to the Median, Ulnar, Radial Nerve, Motor Intact to the Median, Ulnar, Radial Nerve, and Pulses Intact  Musculoskeletal: Normal, except as noted in detailed exam and in HPI.      MUSCULOSKELETAL EXAMINATION:  Right hand:   Mild edema  Skin with some abrasions and excoriations over the forearm and hand due to dry skin   Able to make loose fist   No malrotation  Sensation at baseline   Digits well perfused     ___________________________________________________  STUDIES REVIEWED:  I have personally reviewed and interpreted  AP lateral and oblique radiographs of the right hand which demonstrate a slightly angulated 5th MC neck fracture        LABS REVIEWED:    HgA1c:   Lab Results   Component Value Date    HGBA1C 5.5 05/09/2025     BMP:   Lab Results   Component Value Date    GLUCOSE 275 (H) 02/21/2020    CALCIUM 9.0 05/10/2025     10/28/2015    K 5.0 05/10/2025    CO2 28 05/10/2025    CL 95 (L) 05/10/2025    BUN 21 05/10/2025    CREATININE 7.66 (H) 05/10/2025         PROCEDURE ADDITIONAL : CPT 40083  Removal of splint applied by another health care provider  After obtaining permission from the patient via verbal consent, the  right upper extremity ulnar gutter splint was removed atraumatically to allow for examination of the injured limb.           PROCEDURES PERFORMED:  Procedures  No Procedures performed today    _____________________________________________________      Scribe Attestation      I,:  Rosibel Steel PA-C am acting as a scribe while in the presence of the attending physician.:       I,:  Mary Modi MD personally performed the services described in this documentation    as scribed in my presence.:                          [1]   Past Medical History:  Diagnosis Date    Acute kidney injury (HCC)     Ambulates with cane     Anuria     Anxiety     Cellulitis of right elbow 03/31/2021    Chronic kidney disease     COVID-19 10/18/2024    Depression     Diabetes mellitus (HCC)     Diarrhea     Emesis 10/24/2020    End stage renal disease (HCC) 02/11/2018    Formatting of this note might be different from the original. Last Assessment & Plan:  Secondary to DM.  On nightly PD.  Followed by [facility].  [facility] Formatting of this note might be different from the original. Last Assessment & Plan:  Formatting of this note might be different from the original. 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    Esophagitis 07/21/2015    Falls     Gastroparesis     GERD (gastroesophageal reflux disease)     History of shingles 2010    History of transfusion 02/2018    no adverse reaction    Hyperlipidemia     Hyperphosphatemia     Hypertension     Hypoglycemia 07/15/2022    Itching     Mastoiditis of right side 07/15/2022    Muscle weakness     general unsteadiness    Obesity (BMI 30.0-34.9) 09/09/2019    Orthostatic hypotension 10/25/2020    Peripheral polyneuropathy 11/20/2019    PONV (postoperative nausea and vomiting) 01/26/2018    Protein-calorie malnutrition (HCC) 11/23/2020    Recurrent peritonitis (HCC) due to peritoneal dialysis  catheter 07/31/2020    Retinopathy     Seizures (HCC)     early 2020 - one time    Skin abnormality     some dime size areas where skin was scratched from itching    Sleep apnea     Spontaneous bacterial peritonitis (HCC) 10/19/2020    Squamous cell skin cancer     left temple    Stroke (HCC)     x2 - off balance/no driving/fatigue    Swelling of both lower extremities     Swelling of joint of upper arm, right 04/03/2024    Traumatic onycholysis 07/21/2022    Vomiting     Wears glasses     Word finding difficulty 11/05/2024   [2]   Past Surgical History:  Procedure Laterality Date    CARDIAC ELECTROPHYSIOLOGY PROCEDURE N/A 9/21/2023    Procedure: Cardiac loop recorder explant;  Surgeon: Parish Morgan MD;  Location: [facility];  Service: Cardiology    CARDIAC LOOP RECORDER  05/2018    COLONOSCOPY      EGD      EYE SURGERY Right     HEMODIALYSIS ADULT  11/6/2024    IR AV FISTULAGRAM/GRAFTOGRAM  02/23/2021    IR CEREBRAL ANGIOGRAPHY  1/12/2024    IR CEREBRAL ANGIOGRAPHY / INTERVENTION  1/5/2024    IR TUNNELED CENTRAL LINE PLACEMENT  02/16/2021    IR TUNNELED DIALYSIS CATHETER PLACEMENT  11/18/2020    IR TUNNELED DIALYSIS CATHETER REMOVAL  02/12/2021    IR TUNNELED DIALYSIS CATHETER REMOVAL  03/11/2021    MOHS SURGERY Left 12/14/2022    Left temple with Dr. Hassan    PERITONEAL CATHETER INSERTION N/A 08/27/2018    Procedure: UNROOF PD CATHETER;  Surgeon: Felipe Lindo DO;  Location: [facility];  Service: General    VA ARTERIOVENOUS ANASTOMOSIS OPEN DIRECT Left 11/09/2020    Procedure: CREATION FISTULA  ARTERIOVENOUS (AV) - LEFT WRIST;  Surgeon: Placido Altamirano MD;  Location: [facility];  Service: Vascular    VA ESOPHAGOGASTRODUODENOSCOPY TRANSORAL DIAGNOSTIC N/A 04/18/2019    Procedure: ESOPHAGOGASTRODUODENOSCOPY (EGD);  Surgeon: Ale Figueroa MD;  Location: [facility];  Service: Gastroenterology    VA LAPS INSERTION TUNNELED INTRAPERITONEAL CATHETER N/A 08/06/2018    Procedure: LAPAROSCOPIC PD CATHETER  PLACEMENT;  Surgeon: Felipe Lindo DO;  Location: [facility];  Service: General    ND REMOVAL TUNNELED INTRAPERITONEAL CATHETER N/A 2020    Procedure: REMOVAL CATHETER PERITONEAL DIALYSIS;  Surgeon: Abdifatah Ty MD;  Location: [facility];  Service: General    TONSILLECTOMY      UPPER GASTROINTESTINAL ENDOSCOPY     [3]   Family History  Problem Relation Name Age of Onset    Breast cancer Mother      Hypertension Mother      Hyperlipidemia Father      Hypertension Father      Leukemia Maternal Grandmother      Hyperlipidemia Maternal Grandfather      Hypertension Maternal Grandfather      Hyperlipidemia Paternal Grandmother      Hypertension Paternal Grandmother      Heart disease Paternal Grandfather          cardiac disorder    Diabetes Paternal Grandfather     [4]   Social History  Tobacco Use    Smoking status: Former     Current packs/day: 0.00     Average packs/day: 0.5 packs/day for 12.0 years (6.0 ttl pk-yrs)     Types: Cigarettes     Start date: 2006     Quit date: 2018     Years since quittin.4    Smokeless tobacco: Never    Tobacco comments:     quit 2018   Vaping Use    Vaping status: Every Day    Substances: THC, CBD   Substance Use Topics    Alcohol use: Not Currently    Drug use: Yes     Types: Marijuana     Comment: medical marijuana last vaped 2024   [5]   Current Outpatient Medications:     aspirin (ECOTRIN LOW STRENGTH) 81 mg EC tablet, Take 81 mg by mouth in the morning., Disp: , Rfl:     atorvastatin (LIPITOR) 40 mg tablet, TAKE 1 TABLET BY MOUTH ONCE DAILY IN THE EVENING, Disp: 90 tablet, Rfl: 1    b complex vitamins capsule, Take 1 capsule by mouth daily before lunch, Disp: , Rfl:     calcium acetate (PHOSLO) capsule, Take 1 capsule (667 mg total) by mouth 3 (three) times a day, Disp: 90 capsule, Rfl: 0    cinacalcet (SENSIPAR) 60 MG tablet, Take 1 tablet (60 mg total) by mouth daily, Disp: 30 tablet, Rfl: 0    cloNIDine (CATAPRES) 0.2 mg tablet, Take 1 tablet (0.2 mg  total) by mouth every 8 (eight) hours, Disp: 90 tablet, Rfl: 0    escitalopram (LEXAPRO) 20 mg tablet, Take 1 tablet (20 mg total) by mouth daily, Disp: 90 tablet, Rfl: 1    gabapentin (NEURONTIN) 100 mg capsule, TAKE 1 CAPSULE BY MOUTH IN THE MORNING AND 2 CAPSULES AT BEDTIME, Disp: 90 capsule, Rfl: 5    GLUCAGON EMERGENCY 1 MG injection, , Disp: , Rfl: 3    Gvoke HypoPen 1-Pack 1 MG/0.2ML SOAJ, as needed, Disp: , Rfl:     Insulin Disposable Pump (Omnipod 5 G6 Intro, Gen 5,) KIT, , Disp: , Rfl:     Insulin Disposable Pump (Omnipod 5 G6 Pod, Gen 5,) MISC, , Disp: , Rfl:     labetalol (NORMODYNE) 200 mg tablet, Take 2 tablets (400 mg total) by mouth 3 (three) times a day, Disp: 180 tablet, Rfl: 2    NIFEdipine (PROCARDIA XL) 90 mg 24 hr tablet, Take 1 tablet (90 mg total) by mouth 2 (two) times a day, Disp: 60 tablet, Rfl: 0    NovoLOG 100 UNIT/ML injection, , Disp: , Rfl:     olmesartan (BENICAR) 40 mg tablet, Take 40 mg by mouth in the morning., Disp: , Rfl:     ondansetron (ZOFRAN) 4 mg tablet, Take 1 tablet (4 mg total) by mouth every 8 (eight) hours as needed for nausea or vomiting, Disp: 90 tablet, Rfl: 2    torsemide (DEMADEX) 100 mg tablet, Take 1 tablet (100 mg total) by mouth daily Do not start before January 8, 2025., Disp: 30 tablet, Rfl: 2    traZODone (DESYREL) 50 mg tablet, TAKE 1 TABLET BY MOUTH ONCE DAILY AT BEDTIME AS NEEDED FOR SLEEP, Disp: 30 tablet, Rfl: 1    hydrALAZINE (APRESOLINE) 100 MG tablet, Take 1 tablet (100 mg total) by mouth every 8 (eight) hours, Disp: 90 tablet, Rfl: 2    hydrOXYzine HCL (ATARAX) 10 mg tablet, Take 1 tablet (10 mg total) by mouth every 6 (six) hours as needed for itching or anxiety (Patient not taking: Reported on 6/12/2025), Disp: 30 tablet, Rfl: 3    oxyCODONE (ROXICODONE) 5 immediate release tablet, Take 1 tablet (5 mg total) by mouth every 8 (eight) hours as needed for moderate pain for up to 16 doses Max Daily Amount: 15 mg (Patient not taking: Reported on  7/9/2025), Disp: 16 tablet, Rfl: 0  [6]   Allergies  Allergen Reactions    Sulfa Antibiotics Rash

## 2025-07-09 NOTE — PROGRESS NOTES
Orthosis    Diagnosis:   1. Closed displaced fracture of neck of fifth metacarpal bone of right hand, initial encounter  Ambulatory Referral to PT/OT Hand Therapy        Indication: Fracture    Location: Right  ring finger and small finger  Supplies: Custom Fit Orthotic  Orthosis type: Ulnar Gutter Hand Based  Wearing Schedule: Remove for hygiene only  Describe Position: MCP/PIP included, IP Free, wrist free    Precautions: Fracture    Patient or Caregiver expresses understanding of wearing Schedule and Precautions? Yes  Patient or Caregiver able to don/doff orthotic independently?Yes    Written orders provided to patient? Yes  Orders Obtained: Written  Orders Obtained from: Dr. Modi    Return for evaluation and treatment Yes

## 2025-07-10 ENCOUNTER — OFFICE VISIT (OUTPATIENT)
Facility: CLINIC | Age: 40
End: 2025-07-10
Attending: INTERNAL MEDICINE
Payer: MEDICARE

## 2025-07-10 ENCOUNTER — OFFICE VISIT (OUTPATIENT)
Dept: SPEECH THERAPY | Facility: CLINIC | Age: 40
End: 2025-07-10
Attending: INTERNAL MEDICINE
Payer: MEDICARE

## 2025-07-10 DIAGNOSIS — R41.841 COGNITIVE COMMUNICATION DEFICIT: Primary | ICD-10-CM

## 2025-07-10 DIAGNOSIS — R26.89 IMBALANCE: Primary | ICD-10-CM

## 2025-07-10 DIAGNOSIS — I63.9 CEREBROVASCULAR ACCIDENT (CVA), UNSPECIFIED MECHANISM (HCC): ICD-10-CM

## 2025-07-10 DIAGNOSIS — R29.90 STROKE-LIKE SYMPTOM: ICD-10-CM

## 2025-07-10 PROCEDURE — 97530 THERAPEUTIC ACTIVITIES: CPT

## 2025-07-10 PROCEDURE — 92507 TX SP LANG VOICE COMM INDIV: CPT

## 2025-07-10 NOTE — PROGRESS NOTES
Speech Treatment Note    Today's date: 7/10/2025  Patient name: Mateus Mckeon  : 1985  MRN: [id number]  Referring provider: Dania Sher DO  Dx:   Encounter Diagnosis     ICD-10-CM    1. Cognitive communication deficit  R41.841       2. Cerebrovascular accident (CVA), unspecified mechanism (HCC)  I63.9                                POC expires Auth Status Total   Visits  Start date  Expiration date PT/OT + Visit Limit? Co-Insurance   25 Medicare  BOMN 3/13/25 N/A No  Yes                                             Visit/Unit Tracking  AUTH Status: BOMN Date 6/20 6/24 7/1 7/3 7/10          Visits  Authed: AFTER 24 VISTS. Used 1 2 3 4 5 6 7 8 9 10     PN: every 10th visit  Remaining  10 9 8 7 6 5 4 3 2 1            Subjective/Behavioral: Patient arrived on time and attended to all session activities. Patient reported feeling pain in hand and rated it at a 3 on a 10 point scale. Clinician continued to monitor throughout the session. Clinician asked patient to complete a previously assigned and incomplete HEP this week.    Short-term goals:  1. Patient will complete attention dependent tasks with less than 2 errors per activity or 90% accuracy.   Bill was tasked sorting cards into three groups (e.g., odd numbers, even numbers, face) while simultaneously tracking 4 target cards (e.g.,2-8-4-6) and naming an item in a predetermined category (e.g.,90's song titles and things you order in a diner) when either card was shown. Bill made 0 attention errors throughout exercise. Patient named 15/16 items independently. Immediate recall was at 75% accuracy (I). Delayed recall was not addressed due to time constraints.     2. Improve high level word finding to 80% accuracy  Not addressed this session.    3. Complete executive function dependent tasks with 80% accuracy or higher.   To address executive function and working memory, patient was asked to repeat a list of three randomized words in alphabetical  order. Words were read aloud by clinician, no visual support was provided. Patient achieved 90% accuracy (I) (9/10 opportunities). Titrate up in subsequent sessions.      Long-term goals:  -Improve cognitive linguistic function.       Skilled Intervention Statement  Titrated clueing was used throughout the above mentioned activities. Titrated clueing is a skilled, hierarchical intervention which allows for the patient to arrive at a correct response which would otherwise be outside their current capacity. It is initiated by the therapist and is based on clinical analysis of patient's response to the activity presented by the therapist. Under this system, the activity is then adjusted slightly in difficulty in order to maximize performance, which, in turn, drives recovery or helps maintain stability. Titrated clueing allows for maximum neuro-cognitive stimulation while permitting the patient a meaningful measure of control over the task.     Other:Patient was provided with home exercises/ activies to target goals in plan of care.   Recommendations:Continue with Plan of Care    Services provided by:    Kimmy Hung M.A. CF-SLP  Temporary License #: [id number]

## 2025-07-10 NOTE — PROGRESS NOTES
PT Re-Evaluation / PT Discharge             POC expires Auth Status Total   Visits  Start date  Expiration date PT/OT + Visit Limit? Co-Insurance   25 Auth req after 24v NA NA NA BOMN Yes, $0 co-pay                                                                         Visit/Unit Tracking  AUTH Status:  Date 5/13 5/15 5/20 5/22 5/27 5/29 6/12 6/17 7/10/25      Auth req after 24v   used Used 1 1 1 1 1 1 1 1 1       Remaining  17 16 15   14 13 12 11 10 9                 Today's date: 7/10/2025  Patient name: Mateus Mckeon  : 1985  MRN: [id number]  Referring provider: Dania Sher DO  Dx:   Encounter Diagnosis     ICD-10-CM    1. Imbalance  R26.89       2. Stroke-like symptom  R29.90                       Assessment  Assessment details: Patient is a 39 y.o. Male who has been seen in PT for the past two months to address mobility, balance, and strength deficit secondary to chronic CVA. PT attendance has been limited due to patient not feeling well. Patient recently broke his R hand which is currently in an splint and NWB. Due to NWB status of his R hand, he is unable to use the SPC therefore arrived to therapy without AD. Patient has reported no falls since starting PT session. Currently, he made significant improvement on TUG and FSTS. Based on TUG, patient is consider an LOW fall risk however, patient's gait is unable with WBOS and path deviation. FSTS demonstrated an significant improvement however, is still consider an HIGH fall risk. Gait speed improved significantly but has not met the criteria to cross the street. FGA score remained relatively unchanged and is consider an high fall risk. On the mCTSIB, he improved to passing 3 out of 4 conditions compared to only 2 conditions. Outcome measures has returned back to his baseline from prior to his recent hospitalization. Patient has met 4 goals indicating good  response to therapy. Despite patient is still an HIGH fall risk, he has returned back to baseline and patient reports he is ready for discharge and return back in 3 months for check.     Impairments: Abnormal coordination, Abnormal gait, Abnormal muscle tone, Abnormal or restricted ROM, Activity intolerance, Impaired balance, Impaired physical strength, Lacks appropriate HEP, Poor posture, Poor body mechanics, Pain with function, Safety issue, Weight-bearing intolerance, Abnormal movement, Difficulty understanding, Abnormal muscle firing  Understanding of Dx/Px/POC: Good  Prognosis: Good    Patient verbalized understanding of POC.    Please contact me if you have any questions or recommendations. Thank you for the referral and the opportunity to share in Mateus Mckeon's care.      Plan  Plan details: Discharge from skilled PT - return back in 3 months for check in         Goals  Short Term Goals (4 weeks):    - Patient will improve TUG score by MDC of 2.9 to indicate improved functional mobility and improved safety - MET  - Patient will be independent with simple HEP to indicate improved independence - NOT met   - Patient will report 75% improvement in overall function and mobility - MET   - Patient will be able to negotiate stairs reciprocally without UE support to indicate improve safety - NOT met     Long Term Goals (6 weeks):  - Patient will improve 5x STS score to </= score at discharge to indicate return to prior level of function - MET   - Patient will report 90% improvement in mobility and tolerance to activity to indicate improved independence - NOT met   - Patient will complete 3rd condition of mCTSIB to indicate improved balance strategies on compliant  surface - MET   - Patient will be independent with complex HEP to improve independence and indicate maintenance of functional gains.  - Not MET       Cut off score    All date taken from [facility] Neuro Section or Rehab Measures      Kayli Vernon  "al., 2018  MDC: 2.7 pts    She et al., 2011  Cut-off score: 45/56    Chronic CVA  < 44/56 high risk for falls (Siva et al., 2018)  < 47.5/56 slow walker status (Jus rodríguez al., 2011) 5xSTS: Jean Carlos 2010  MDC: 2.3 sec  Age Norms:  60-69: 11.1 sec  70-79: 12.6 sec  80-89: 14.8 sec    Yamileth, 2010, Chronic Stroke  Chronic CVA: 12 sec   TUG  Nubia rodríguez al., 2005  MDC: 2.9 sec    Cut off score:  >13.5 sec community dwelling adults  >32.2 frail elderly  <20 I for basic transfers  >30 dependent on transfers 10 Meter Walk Test:   Talia rodríguez al., 2006  Small meaningful change: 0.06 m/s  Substantial meaningful change: 0.14 m/s  MCID: 0.16 m/s    < 0.4 m/s household ambulators  0.4 - 0.8 m/s limited community ambulators  > 0.8 m/s community ambulators   FGA: Deepika et al., 2010  MCID: 4.2 pts  Geriatrics/community < 22/30 fall risk  Geriatrics/community < 20/30 unexplained falls DGI  MDC: vestibular - 4 pts  MDC: geriatric/community - 3 pts  Falls risk <19/24   6 Minute Walk Test  Talia et al., 2006  MDC: 60.98 m (200.01 ft)    Verónica Norwood, & Ally, 2012  MCID: 34.4 m    Age Norms  60-69: M - 1876 ft   F - 1765 ft  70-79: M - 1729 ft   F - 1545 ft  80-89: M - 1368 ft   F - 1286 ft Modified Joel  0: No increase in tone  1: Catch and release or min resistance at end range  1+: Catch f/b min resistance throughout remainder (< half ROM)  2: Easily moved, but more marked tone throughout most ROM  3: Significant tone, PROM difficult  4: Rigid   MiniBest: Glenda et al., 2013  CVA < 17.5 fall risk Pass (Acute CVA)  MDC: 1.8 points (acute), 3.2 points (chronic)         Subjective    History of Present Illness  Mechanism of injury: Patient is familiar to balance center as he participated with OT and PT services before self-discharging from PT after 09/06/24 secondary to \"feeling ready and past the point of needing PT services\". Patient past medical history significant for CVA (3rd one in January), ESRD which he goes to dialysis " for 3x a week, frequent falls, and COVID. During his hiatus from PT patient had 2 falls and COVID with subsequent ~5 day hospitalization on 10/18/24. He had a second hospital visit ~2 weeks ago where he was exhibiting seizure like activity. As per patient, medical professionals did not provide rationale for seizure like activity as imaging returned negative. He was not placed on any new medication and was discharged home. Follow-up with neurology in January; denies any new seizure like activity. He ambulates with his rollator while out in his community and cruises on furniture at home despite recommended use of cane or RW at home. Patient largest complaint is that he feels overtly fatigued as his tolerance to activity has vastly decreased, increased word finding difficulties, and decreased balance. His major goal is to improve tolerance and return to level he was prior to self discharge.     Updated (12/24/2024): Patient reports for reassessment after hiatus from PT services due to being sick with Covid. He feels his endurance has been worse since being sick.    Updated (1/16/25): Patient has been hospitalized twice within the past month (on 1/4 and 1/9) for hypertensive emergency, and also was diagnosed with the flu.He reports feeling weak since being discharged from hospital. He presents with SPC.     Updated (2/18/25): Pt reports BP's have been better, continues to get dizzy but it's bearable and more of his baseline now. He has eliminated some of his BP pills. Arrives with rollator today; contines to alternate between cane and rollator depending how he feels.     Updated 4/3/25: Pt reports dizziness is stable. Has been busy with dr christiansen in [facility], so hasn't been to PT for past 2 weeks. Having a dog has really helped him mentally, and physically because he is helping to care for the dog.    Updated 5/13/25: Patient presents back to PT following ~3 day hospital stay secondary to high blood pressure with reported  readings of SBP in the 200s. He states he had a headache and was nauseas. Imaging did not reveal any acute abnormalities as per patient he was told it was a hypertensive event. Since being discharged home he has noticed decrease in endurance and balance. He continues to attend dialysis every M,W, and F at 11 am. Denies any falls. He will be undergoing cataracts surgery late July.    Update 7/10/25: Patient reported he is feeling good today.  Patient had an R boxer fracture after punching a wall approx 1 week ago. Seen by Ortho - in an splint, HENNA. Patient's attendance has been limited due to patient not feeling feel. Patient would like to be discharge and return back in 3 months.     Primary AD: currently SPC and rollator depending how he is feeling.   (PLOF he mostly used SPC and only used rollator for long distances like going to nephew's soccer game)   Assist level at home: lives with parents who are assisting with ADL's and IADL's, but he is transferring and walking independently.   WC usage: none  PLOF: using SPC mostly, still required some assist from parents for ADL's and IADL's   Patient goals: to walk with SPC again, work on balance, build up walking endurance     Pain  Pain in both feet due to neuropathy     Social Support  Steps to enter house: 2 NIRU  Stairs in house: full flight to 2nd floor (able to perform independently)    Lives in: 2 story home   Lives with: parents, and new puppy!    Employment status: disabled   Hand dominance: right    Treatments  Previous treatment: PT, OT  Current treatment: PT, OT  Diagnostic Testing:       Objective   LE MMT   - R Hip Flexion: 4/5  - L Hip Flexion: 4+/5  - R Hip Extension: 4+/5  - L Hip Extension: 4+/5  - R Hip Abduction: 5/5  - L Hip abduction: 5/5  - R Hip adduction: 5/5  - L Hip adduction: 5/5  - R Knee Extension: 4+/5 - L Knee Extension: 4+/5  - R Knee Flexion: 4+/5  - L Knee Flexion: 4+/5  - R Ankle DF: 4/5  - L Ankle DF: 4/5  - R Ankle PF: 4+/5  - L  Ankle PF: 4+/5    LE MMT Updated 5/13/25  - R Hip Flexion: 4/5  - L Hip Flexion: 4+/5  - R Hip Extension: 4+/5  - L Hip Extension: 4+/5  - R Hip Abduction: 5/5  - L Hip abduction: 5/5  - R Hip adduction: 5/5  - L Hip adduction: 5/5  - R Knee Extension: 4+/5 - L Knee Extension: 4+/5  - R Knee Flexion: 4+/5  - L Knee Flexion: 4+/5  - R Ankle DF: 4/5  - L Ankle DF: 4/5  - R Ankle PF: 4+/5  - L Ankle PF: 4+/5    Sensation  - Light touch: neuropathy both feet up to below knee    Coordination  - Alternating Toe Taps: impaired  - Heel to shin: impaired   - Dysmetria: R side undershooting   - Dysdiadochokinesia: R slowed     Neglect  - R sided: No  - L sided: No    Gait  Wide COLBY, ataxic, decreased step length    BP: 138/72 mmHg  HR: 87 bpm  SpO2: 97%      Outcome Measures Initial Eval  1/30/24 PN/DC  9/4/2024 Initial Evaluation  11/18/24 PN  12/24/2024 Re-eval  1/16/2025 Re-eval  2/18/2025 Re-eval  4/3/2025 RE  5/13/25 RE   7/10/25   5xSTS 31.25 sec 13.67 sec   no UE defer 16.33 sec,   no UE 21.03 sec, no UE  13.07 sec, no UE 16.44 sec, no UE 18.00 sec, no UE 13.59 sec, no UE support   TUG 16.3 sec with rollator    23.6 sec no AD 11.48 sec   no AD 18.91 sec w/ rollator     17.16 sec no AD 12.18 sec   no AD 15.72 sec, no AD 10.87 sec, no AD 13.02 sec, no AD 12.00 sec, no AD 9.10 sec, no AD    10 meter  1.11 m/s self selected pace 0.84 m/s self selected pace  1.05 m/s self selected pace 0.74 m/s 1.14 m/s  0.67 m/s w/ SPC 0.93 m/s without AD    MEDRANO  49/56 32/56 40/56 40/56 43/56      FGA 12/30 15/30 defer 12/30 11/30 16/30 16/30 16/30   6MWT 900 ft 955 ft no AD defer 825 ft no  ft, no  ft, no AD 1,040 ft, no AD defer 1050 feet, no AD    mCTSIB  FTEO firm  FTEC firm  FTEO foam  FTEC foam   30 sec  30 sec  30 sec  Unable defer 30 sec  30 sec  30 sec  2 sec 30 sec  30 sec  16 sec  2 sec 30 sec  30 sec  17 sec  0 sec 30 sec  30 sec  8 sec  0 sec 30 sec  30 sec  15 sec  0 sec 30 sec  30 sec  30 sec   4 sec   ABC   66.88% 62.5% 57.5% 49.38% 60.63% 62.5% defer Defer         Precautions: Check BP's RUE only (fistula LUE), frequent falls, R boxer fracture (NWB with splint)

## 2025-07-15 ENCOUNTER — OFFICE VISIT (OUTPATIENT)
Age: 40
End: 2025-07-15
Payer: MEDICARE

## 2025-07-15 ENCOUNTER — OFFICE VISIT (OUTPATIENT)
Dept: SPEECH THERAPY | Facility: CLINIC | Age: 40
End: 2025-07-15
Attending: INTERNAL MEDICINE
Payer: MEDICARE

## 2025-07-15 ENCOUNTER — APPOINTMENT (OUTPATIENT)
Facility: CLINIC | Age: 40
End: 2025-07-15
Attending: INTERNAL MEDICINE
Payer: MEDICARE

## 2025-07-15 VITALS
SYSTOLIC BLOOD PRESSURE: 130 MMHG | BODY MASS INDEX: 27.58 KG/M2 | OXYGEN SATURATION: 98 % | WEIGHT: 197 LBS | HEART RATE: 72 BPM | DIASTOLIC BLOOD PRESSURE: 84 MMHG | RESPIRATION RATE: 16 BRPM | HEIGHT: 71 IN

## 2025-07-15 DIAGNOSIS — F33.41 RECURRENT MAJOR DEPRESSIVE DISORDER, IN PARTIAL REMISSION (HCC): ICD-10-CM

## 2025-07-15 DIAGNOSIS — Z00.00 MEDICARE ANNUAL WELLNESS VISIT, SUBSEQUENT: ICD-10-CM

## 2025-07-15 DIAGNOSIS — E78.5 DYSLIPIDEMIA: ICD-10-CM

## 2025-07-15 DIAGNOSIS — I74.9 THROMBOEMBOLIC DISORDER (HCC): ICD-10-CM

## 2025-07-15 DIAGNOSIS — G47.00 INSOMNIA, UNSPECIFIED TYPE: ICD-10-CM

## 2025-07-15 DIAGNOSIS — S62.336D CLOSED DISPLACED FRACTURE OF NECK OF FIFTH METACARPAL BONE OF RIGHT HAND WITH ROUTINE HEALING, SUBSEQUENT ENCOUNTER: Primary | ICD-10-CM

## 2025-07-15 DIAGNOSIS — R41.841 COGNITIVE COMMUNICATION DEFICIT: Primary | ICD-10-CM

## 2025-07-15 DIAGNOSIS — I63.9 CEREBROVASCULAR ACCIDENT (CVA), UNSPECIFIED MECHANISM (HCC): ICD-10-CM

## 2025-07-15 PROCEDURE — G2211 COMPLEX E/M VISIT ADD ON: HCPCS | Performed by: INTERNAL MEDICINE

## 2025-07-15 PROCEDURE — G0439 PPPS, SUBSEQ VISIT: HCPCS | Performed by: INTERNAL MEDICINE

## 2025-07-15 PROCEDURE — 99214 OFFICE O/P EST MOD 30 MIN: CPT | Performed by: INTERNAL MEDICINE

## 2025-07-15 PROCEDURE — 92507 TX SP LANG VOICE COMM INDIV: CPT

## 2025-07-17 ENCOUNTER — OFFICE VISIT (OUTPATIENT)
Dept: SPEECH THERAPY | Facility: CLINIC | Age: 40
End: 2025-07-17
Attending: INTERNAL MEDICINE
Payer: MEDICARE

## 2025-07-17 ENCOUNTER — APPOINTMENT (OUTPATIENT)
Facility: CLINIC | Age: 40
End: 2025-07-17
Attending: INTERNAL MEDICINE
Payer: MEDICARE

## 2025-07-17 DIAGNOSIS — I63.9 CEREBROVASCULAR ACCIDENT (CVA), UNSPECIFIED MECHANISM (HCC): ICD-10-CM

## 2025-07-17 DIAGNOSIS — R41.841 COGNITIVE COMMUNICATION DEFICIT: Primary | ICD-10-CM

## 2025-07-17 PROCEDURE — 92507 TX SP LANG VOICE COMM INDIV: CPT

## 2025-07-17 NOTE — PROGRESS NOTES
Speech Treatment Note    Today's date: 2025  Patient name: Mateus Mckeon  : 1985  MRN: [id number]  Referring provider: Dania Sher DO  Dx:   Encounter Diagnosis     ICD-10-CM    1. Cognitive communication deficit  R41.841       2. Cerebrovascular accident (CVA), unspecified mechanism (HCC)  I63.9                                    POC expires Auth Status Total   Visits  Start date  Expiration date PT/OT + Visit Limit? Co-Insurance   25 Medicare  BOMN 3/13/25 N/A No  Yes                                             Visit/Unit Tracking  AUTH Status: BOMN Date 6/20 6/24 7/1 7/3 7/10 7/15 7/17        Visits  Authed: AFTER 24 VISTS. Used 1 2 3 4 5 6 7 8 9 10     PN: every 10th visit  Remaining  10 9 8 7 6 5 4 3 2 1            Subjective/Behavioral: Patient arrived on time and attended to all session activities. Clinician asked patient to complete a previously assigned and incomplete HEP this week.    Short-term goals:  1. Patient will complete attention dependent tasks with less than 2 errors per activity or 90% accuracy.   Paulo was tasked sorting cards into five groups (e.g., odd numbers - red and black, even numbers - red and black, face) while simultaneously tracking 4 target cards (e.g.2-9-3-6) and naming an item in a predetermined category (e.g.,Movies in  and things in summer) when either card was shown. Bill made 1 attention error throughout exercise. Patient named 16/16 items independently. Immediate recall was at 86% accuracy (I). Patient appeared to benefit from using an association strategy. Delayed recall was at 93% accuracy (I).    Attention / Alternating Attention: Counting 1-50 and replacing multiples of 5 with the word “Boom” and double digits of same number with “Snap: Patient completed task 2 times, once alternating with clinician and once independently. Patient averaged 1 attention error per attempt.    2. Improve high level word finding to 80% accuracy  Not  addressed this session.    3. Complete executive function dependent tasks with 80% accuracy or higher.   Not addressed this session.    Long-term goals:  -Improve cognitive linguistic function.       Skilled Intervention Statement  Titrated clueing was used throughout the above mentioned activities. Titrated clueing is a skilled, hierarchical intervention which allows for the patient to arrive at a correct response which would otherwise be outside their current capacity. It is initiated by the therapist and is based on clinical analysis of patient's response to the activity presented by the therapist. Under this system, the activity is then adjusted slightly in difficulty in order to maximize performance, which, in turn, drives recovery or helps maintain stability. Titrated clueing allows for maximum neuro-cognitive stimulation while permitting the patient a meaningful measure of control over the task.     Other:Patient was provided with home exercises/ activies to target goals in plan of care.   Recommendations:Continue with Plan of Care    Services provided by:    Kimmy Hung M.A., CF-SLP  Temporary License #: [id number]

## 2025-07-22 ENCOUNTER — APPOINTMENT (OUTPATIENT)
Facility: CLINIC | Age: 40
End: 2025-07-22
Attending: INTERNAL MEDICINE
Payer: MEDICARE

## 2025-07-23 ENCOUNTER — OFFICE VISIT (OUTPATIENT)
Dept: OBGYN CLINIC | Facility: CLINIC | Age: 40
End: 2025-07-23
Payer: MEDICARE

## 2025-07-23 ENCOUNTER — APPOINTMENT (OUTPATIENT)
Dept: RADIOLOGY | Facility: AMBULARY SURGERY CENTER | Age: 40
End: 2025-07-23
Attending: SURGERY
Payer: MEDICARE

## 2025-07-23 VITALS — WEIGHT: 197 LBS | BODY MASS INDEX: 27.58 KG/M2 | HEIGHT: 71 IN

## 2025-07-23 DIAGNOSIS — S62.336A CLOSED DISPLACED FRACTURE OF NECK OF FIFTH METACARPAL BONE OF RIGHT HAND, INITIAL ENCOUNTER: ICD-10-CM

## 2025-07-23 DIAGNOSIS — S62.336D CLOSED DISPLACED FRACTURE OF NECK OF FIFTH METACARPAL BONE OF RIGHT HAND WITH ROUTINE HEALING, SUBSEQUENT ENCOUNTER: Primary | ICD-10-CM

## 2025-07-23 PROCEDURE — 99213 OFFICE O/P EST LOW 20 MIN: CPT | Performed by: SURGERY

## 2025-07-23 PROCEDURE — 73130 X-RAY EXAM OF HAND: CPT

## 2025-07-23 NOTE — PROGRESS NOTES
Speech Treatment Note    Today's date: 2025  Patient name: Mateus Mckeon  : 1985  MRN: [id number]  Referring provider: Dania Sher DO  Dx:   Encounter Diagnosis     ICD-10-CM    1. Cognitive communication deficit  R41.841       2. Cerebrovascular accident (CVA), unspecified mechanism (HCC)  I63.9                           Start Time: 0847  Stop Time: 09  Total time in clinic (min): 43 minutes    POC expires Auth Status Total   Visits  Start date  Expiration date PT/OT + Visit Limit? Co-Insurance   25 Medicare  BOMN 3/13/25 N/A No  Yes                                             Visit/Unit Tracking  AUTH Status: BOMN Date 6/20 6/24 7/1 7/3 7/10 7/15 7/17 7/22 7/24      Visits  Authed: AFTER 24 VISTS. Used 1 2 3 4 5 6 7 8 9 10     PN: every 10th visit  Remaining  10 9 8 7 6 5 4 3 2 1            Subjective/Behavioral: Patient arrived on time and attended to all session activities. Clinician asked patient to complete a previously assigned and incomplete HEP this week.    Short-term goals:  1. Patient will complete attention dependent tasks with less than 2 errors per activity or 90% accuracy.   Paulo was tasked sorting cards into five groups (e.g., odd numbers - red and black, even numbers - red and black, face) while simultaneously tracking 4 target cards (e.g.2-4-6-10) and naming an item in a predetermined category (e.g.,movie director and football coaches) when either card was shown. Paulo made 1 attention error throughout exercise. Patient named 16/16 items independently. Immediate recall was at 81% accuracy (I). Patient appeared to benefit from using an association strategy. Delayed recall not attempted due to time constraints.     2. Improve high level word finding to 80% accuracy  Not addressed this session.      3. Complete executive function dependent tasks with 80% accuracy or higher.   Crossword Puzzle: To target executive functioning, patient completed a crossword style  activity where patient was asked to fill in the letters of words with vowel options. Patient began exercise and achieved 100% accuracy (I). Patient required increased processing time. Remainder was provided for HEP.      Long-term goals:  -Improve cognitive linguistic function.       Skilled Intervention Statement  Titrated clueing was used throughout the above mentioned activities. Titrated clueing is a skilled, hierarchical intervention which allows for the patient to arrive at a correct response which would otherwise be outside their current capacity. It is initiated by the therapist and is based on clinical analysis of patient's response to the activity presented by the therapist. Under this system, the activity is then adjusted slightly in difficulty in order to maximize performance, which, in turn, drives recovery or helps maintain stability. Titrated clueing allows for maximum neuro-cognitive stimulation while permitting the patient a meaningful measure of control over the task.     Other:Patient was provided with home exercises/ activies to target goals in plan of care.   Recommendations:Continue with Plan of Care    Services provided by:    Kimmy Hung M.A. CF-SLP  Temporary License #: [id number]

## 2025-07-23 NOTE — PROGRESS NOTES
Mary Mdoi M.D.  Attending, Orthopaedic Surgery  Hand, Wrist, and Elbow Surgery  [facility]      ORTHOPAEDIC HAND, WRIST, AND ELBOW OFFICE  VISIT       ASSESSMENT/PLAN:        Assessment & Plan  Closed displaced fracture of neck of fifth metacarpal bone of right hand with routine healing, subsequent encounter  DOI 7/1/25   New x-rays were obtained and reviewed in office today demonstrating overall maintained alignment of 5th metacarpal neck fracture  Plan to maintain custom ulnar gutter splint for about 3 more weeks  Patient may remove splint for hygiene and digital range of motion but should otherwise wear it majority of the time  Follow-up in 3 weeks for repeat x-rays and evaluation.  Patient has slightly limited digital range of motion at baseline secondary to stroke, will determine need for formal therapy at follow-up visit  maintain cast restrictions    Orders:    XR hand 3+ vw right; Future             The patient verbalized understanding of exam findings and treatment plan. We engaged in the shared decision-making process and treatment options were discussed at length with the patient. Surgical and conservative management discussed today along with risks and benefits.      Follow Up:  Return in about 3 weeks (around 8/13/2025) for Recheck.    To Do Next Visit:  Re-evaluation of current issue and X-rays of the  right  hand    ____________________________________________________________________________________________________________________________________________      CHIEF COMPLAINT:  Chief Complaint   Patient presents with    Right Hand - Pain, Follow-up       SUBJECTIVE:  Mateus CLOTILDE Mckeon is a 39 y.o. year old THD male who presents for follow up evaluation of a right 5th MC fracture.  Patient has been treating in a custom hand-based ulnar motor splint and tolerating this well.  He notes some discomfort and stiffness in the joints but no significant pain.  No paresthesias  above baseline.  Overall doing well     I have personally reviewed all the relevant PMH, PSH, SH, FH, Medications and allergies      PAST MEDICAL HISTORY:  Past Medical History[1]    PAST SURGICAL HISTORY:  Past Surgical History[2]    FAMILY HISTORY:  Family History[3]    SOCIAL HISTORY:  Social History[4]    MEDICATIONS:  Current Medications[5]    ALLERGIES:  Allergies[6]        REVIEW OF SYSTEMS:  Review of Systems   Constitutional:  Negative for chills and fever.   HENT:  Negative for ear pain and sore throat.    Eyes:  Negative for pain and visual disturbance.   Respiratory:  Negative for cough and shortness of breath.    Cardiovascular:  Negative for chest pain and palpitations.   Gastrointestinal:  Negative for abdominal pain and vomiting.   Genitourinary:  Negative for dysuria and hematuria.   Musculoskeletal:  Positive for arthralgias. Negative for back pain.   Skin:  Negative for color change and rash.   Neurological:  Negative for seizures and syncope.   All other systems reviewed and are negative.      VITALS:  There were no vitals filed for this visit.    LABS:      _____________________________________________________  PHYSICAL EXAMINATION:  General: well developed and well nourished, alert, oriented times 3, and appears comfortable  Psychiatric: Normal  HEENT: Normocephalic, Atraumatic Trachea Midline, No torticollis  Pulmonary: No audible wheezing or respiratory distress   Abdomen/GI: Non tender, non distended   Cardiovascular: No pitting edema, 2+ radial pulse   Skin: No masses, erythema, lacerations, fluctation, ulcerations  Neurovascular: Sensation Intact to the Median, Ulnar, Radial Nerve, Motor Intact to the Median, Ulnar, Radial Nerve, and Pulses Intact  Musculoskeletal: Normal, except as noted in detailed exam and in HPI.      MUSCULOSKELETAL EXAMINATION:  Right hand:   No significant edema  Skin intact   Able to make loose fist   No malrotation  Sensation at baseline   Digits well perfused    No significant fracture site tenderness     ___________________________________________________  STUDIES REVIEWED:  I have personally reviewed and interpreted  AP lateral and oblique radiographs of the right hand which demonstrate a slightly angulated 5th MC neck fracture in overall maintained alignment       LABS REVIEWED:    HgA1c:   Lab Results   Component Value Date    HGBA1C 5.3 07/18/2025     BMP:   Lab Results   Component Value Date    GLUCOSE 275 (H) 02/21/2020    CALCIUM 9.0 05/10/2025     10/28/2015    K 5.0 05/10/2025    CO2 28 05/10/2025    CL 95 (L) 05/10/2025    BUN 21 05/10/2025    CREATININE 7.66 (H) 05/10/2025         PROCEDURE ADDITIONAL : CPT 10821  Removal of splint applied by another health care provider  After obtaining permission from the patient via verbal consent, the right upper extremity ulnar gutter splint was removed atraumatically to allow for examination of the injured limb.           PROCEDURES PERFORMED:  Procedures  No Procedures performed today    _____________________________________________________    Rosibel Steel           [1]   Past Medical History:  Diagnosis Date    Acute kidney injury (HCC)     Ambulates with cane     Anuria     Anxiety 2011    Cellulitis of right elbow 03/31/2021    Chronic kidney disease 2018    COVID-19 10/18/2024    Depression 2015    Diabetes mellitus (HCC)     Diarrhea     Emesis 10/24/2020    End stage renal disease (HCC) 02/11/2018    Formatting of this note might be different from the original. Last Assessment & Plan:  Secondary to DM.  On nightly PD.  Followed by [facility].  [facility] Formatting of this note might be different from the original. Last Assessment & Plan:  Formatting of this note might be different from the original. 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    Esophagitis 07/21/2015    Falls     Gastroparesis     GERD  (gastroesophageal reflux disease)     History of shingles 2010    History of transfusion 02/2018    no adverse reaction    Hyperlipidemia     Hyperphosphatemia     Hypertension     Hypoglycemia 07/15/2022    Itching     Mastoiditis of right side 07/15/2022    Muscle weakness     general unsteadiness    Obesity (BMI 30.0-34.9) 09/09/2019    Orthostatic hypotension 10/25/2020    Peripheral polyneuropathy 11/20/2019    PONV (postoperative nausea and vomiting) 01/26/2018    Protein-calorie malnutrition (HCC) 11/23/2020    Recurrent peritonitis (HCC) due to peritoneal dialysis catheter 07/31/2020    Retinopathy     Seizures (HCC) 2018    early 2020 - one time    Skin abnormality     some dime size areas where skin was scratched from itching    Sleep apnea     Spontaneous bacterial peritonitis (HCC) 10/19/2020    Squamous cell skin cancer     left temple    Stroke (HCC)     x2 - off balance/no driving/fatigue    Swelling of both lower extremities     Swelling of joint of upper arm, right 04/03/2024    Traumatic onycholysis 07/21/2022    Vomiting     Wears glasses     Word finding difficulty 11/05/2024   [2]   Past Surgical History:  Procedure Laterality Date    CARDIAC ELECTROPHYSIOLOGY PROCEDURE N/A 9/21/2023    Procedure: Cardiac loop recorder explant;  Surgeon: Parish Morgan MD;  Location: [facility];  Service: Cardiology    CARDIAC LOOP RECORDER  05/2018    COLONOSCOPY      EGD      EYE SURGERY Right 2019    HEMODIALYSIS ADULT  11/06/2024    IR AV FISTULAGRAM/GRAFTOGRAM  02/23/2021    IR CEREBRAL ANGIOGRAPHY  1/12/2024    IR CEREBRAL ANGIOGRAPHY / INTERVENTION  1/5/2024    IR TUNNELED CENTRAL LINE PLACEMENT  02/16/2021    IR TUNNELED DIALYSIS CATHETER PLACEMENT  11/18/2020    IR TUNNELED DIALYSIS CATHETER REMOVAL  02/12/2021    IR TUNNELED DIALYSIS CATHETER REMOVAL  03/11/2021    MOHS SURGERY Left 12/14/2022    Left temple with Dr. Hassan    PERITONEAL CATHETER INSERTION N/A 08/27/2018    Procedure: UNROOF  PD CATHETER;  Surgeon: Felipe Lindo DO;  Location: [facility];  Service: General    [place] ARTERIOVENOUS ANASTOMOSIS OPEN DIRECT Left 2020    Procedure: CREATION FISTULA  ARTERIOVENOUS (AV) - LEFT WRIST;  Surgeon: Placido Altamirano MD;  Location: [facility];  Service: Vascular    WV ESOPHAGOGASTRODUODENOSCOPY TRANSORAL DIAGNOSTIC N/A 2019    Procedure: ESOPHAGOGASTRODUODENOSCOPY (EGD);  Surgeon: Ale Figueroa MD;  Location: [facility];  Service: Gastroenterology    WV LAPS INSERTION TUNNELED INTRAPERITONEAL CATHETER N/A 2018    Procedure: LAPAROSCOPIC PD CATHETER PLACEMENT;  Surgeon: Felipe Lindo DO;  Location: [facility];  Service: General    [place] REMOVAL TUNNELED INTRAPERITONEAL CATHETER N/A 2020    Procedure: REMOVAL CATHETER PERITONEAL DIALYSIS;  Surgeon: Abdifatah Ty MD;  Location: [facility];  Service: General    TONSILLECTOMY      UPPER GASTROINTESTINAL ENDOSCOPY     [3]   Family History  Problem Relation Name Age of Onset    Breast cancer Mother      Hypertension Mother      Hyperlipidemia Father      Hypertension Father      Leukemia Maternal Grandmother      Hyperlipidemia Maternal Grandfather      Hypertension Maternal Grandfather      Hyperlipidemia Paternal Grandmother      Hypertension Paternal Grandmother      Heart disease Paternal Grandfather          cardiac disorder    Diabetes Paternal Grandfather     [4]   Social History  Tobacco Use    Smoking status: Former     Current packs/day: 0.00     Average packs/day: 0.5 packs/day for 12.0 years (6.0 ttl pk-yrs)     Types: Cigarettes     Start date: 2006     Quit date: 2018     Years since quittin.4    Smokeless tobacco: Never    Tobacco comments:     quit 2018   Vaping Use    Vaping status: Every Day    Substances: THC, CBD   Substance Use Topics    Alcohol use: Not Currently    Drug use: Yes     Types: Marijuana     Comment: medical marijuana last vaped 2024   [5]   Current Outpatient Medications:     aspirin  (ECOTRIN LOW STRENGTH) 81 mg EC tablet, Take 81 mg by mouth in the morning., Disp: , Rfl:     atorvastatin (LIPITOR) 40 mg tablet, TAKE 1 TABLET BY MOUTH ONCE DAILY IN THE EVENING, Disp: 90 tablet, Rfl: 1    b complex vitamins capsule, Take 1 capsule by mouth daily before lunch, Disp: , Rfl:     calcium acetate (PHOSLO) capsule, Take 1 capsule (667 mg total) by mouth 3 (three) times a day, Disp: 90 capsule, Rfl: 0    cinacalcet (SENSIPAR) 60 MG tablet, Take 1 tablet (60 mg total) by mouth daily, Disp: 30 tablet, Rfl: 0    escitalopram (LEXAPRO) 20 mg tablet, Take 1 tablet (20 mg total) by mouth daily, Disp: 90 tablet, Rfl: 1    gabapentin (NEURONTIN) 100 mg capsule, TAKE 1 CAPSULE BY MOUTH IN THE MORNING AND 2 CAPSULES AT BEDTIME, Disp: 90 capsule, Rfl: 5    GLUCAGON EMERGENCY 1 MG injection, , Disp: , Rfl: 3    Gvoke HypoPen 1-Pack 1 MG/0.2ML SOAJ, as needed, Disp: , Rfl:     hydrOXYzine HCL (ATARAX) 10 mg tablet, Take 1 tablet (10 mg total) by mouth every 6 (six) hours as needed for itching or anxiety, Disp: 30 tablet, Rfl: 3    Insulin Disposable Pump (Omnipod 5 G6 Intro, Gen 5,) KIT, , Disp: , Rfl:     Insulin Disposable Pump (Omnipod 5 G6 Pod, Gen 5,) MISC, , Disp: , Rfl:     labetalol (NORMODYNE) 200 mg tablet, Take 2 tablets (400 mg total) by mouth 3 (three) times a day, Disp: 180 tablet, Rfl: 2    NIFEdipine (PROCARDIA XL) 90 mg 24 hr tablet, Take 1 tablet (90 mg total) by mouth 2 (two) times a day (Patient taking differently: Take 60 mg by mouth in the morning and 60 mg before bedtime.), Disp: 60 tablet, Rfl: 0    NovoLOG 100 UNIT/ML injection, , Disp: , Rfl:     olmesartan (BENICAR) 40 mg tablet, Take 40 mg by mouth in the morning., Disp: , Rfl:     ondansetron (ZOFRAN) 4 mg tablet, Take 1 tablet (4 mg total) by mouth every 8 (eight) hours as needed for nausea or vomiting, Disp: 90 tablet, Rfl: 2    torsemide (DEMADEX) 100 mg tablet, Take 1 tablet (100 mg total) by mouth daily Do not start before January  8, 2025., Disp: 30 tablet, Rfl: 2    traZODone (DESYREL) 50 mg tablet, TAKE 1 TABLET BY MOUTH ONCE DAILY AT BEDTIME AS NEEDED FOR SLEEP, Disp: 30 tablet, Rfl: 1    cloNIDine (CATAPRES) 0.2 mg tablet, Take 1 tablet (0.2 mg total) by mouth every 8 (eight) hours (Patient not taking: Reported on 7/15/2025), Disp: 90 tablet, Rfl: 0    hydrALAZINE (APRESOLINE) 100 MG tablet, Take 1 tablet (100 mg total) by mouth every 8 (eight) hours, Disp: 90 tablet, Rfl: 2  [6]   Allergies  Allergen Reactions    Sulfa Antibiotics Rash

## 2025-07-23 NOTE — ASSESSMENT & PLAN NOTE
Lab Results   Component Value Date    HGBA1C 5 6 02/21/2020       Recent Labs     02/25/20  0415 02/25/20  0559 02/25/20  1108 02/25/20  1636   POCGLU 470* 337* 267* 111       Blood Sugar Average: Last 72 hrs:  (P) 514 [id number]     With episodes of hypo and hyperglycemia this hospitalization  Patient was initially on insulin drip now off and with a higher dextrose dialysate- nephrology to change dialysate  Endocrinology consulted, appreciate input  Continue Lantus 10 units in the morning and 14 units at night, Humalog 6 units with meals, SSI, Accu-Cheks What Type Of Note Output Would You Prefer (Optional)?: Standard Output Hpi Title: Evaluation of Skin Lesions Have Your Spot(S) Been Treated In The Past?: has been treated Additional History: Pt presents today for full body skin check. History of melanoma in 2020, dysplastic nevi and scc. Most recent was SCC in May of 2023. Pt reports itching from bug bite.

## 2025-07-24 ENCOUNTER — OFFICE VISIT (OUTPATIENT)
Dept: SPEECH THERAPY | Facility: CLINIC | Age: 40
End: 2025-07-24
Attending: INTERNAL MEDICINE
Payer: MEDICARE

## 2025-07-24 ENCOUNTER — APPOINTMENT (OUTPATIENT)
Facility: CLINIC | Age: 40
End: 2025-07-24
Attending: INTERNAL MEDICINE
Payer: MEDICARE

## 2025-07-24 DIAGNOSIS — I63.9 CEREBROVASCULAR ACCIDENT (CVA), UNSPECIFIED MECHANISM (HCC): ICD-10-CM

## 2025-07-24 DIAGNOSIS — R41.841 COGNITIVE COMMUNICATION DEFICIT: Primary | ICD-10-CM

## 2025-07-28 ENCOUNTER — TELEPHONE (OUTPATIENT)
Age: 40
End: 2025-07-28

## 2025-07-29 ENCOUNTER — APPOINTMENT (OUTPATIENT)
Facility: CLINIC | Age: 40
End: 2025-07-29
Attending: INTERNAL MEDICINE
Payer: MEDICARE

## 2025-07-29 ENCOUNTER — OFFICE VISIT (OUTPATIENT)
Dept: SPEECH THERAPY | Facility: CLINIC | Age: 40
End: 2025-07-29
Attending: INTERNAL MEDICINE
Payer: MEDICARE

## 2025-07-29 DIAGNOSIS — I63.9 CEREBROVASCULAR ACCIDENT (CVA), UNSPECIFIED MECHANISM (HCC): ICD-10-CM

## 2025-07-29 DIAGNOSIS — R41.841 COGNITIVE COMMUNICATION DEFICIT: Primary | ICD-10-CM

## 2025-07-29 PROCEDURE — 92507 TX SP LANG VOICE COMM INDIV: CPT

## 2025-07-31 ENCOUNTER — APPOINTMENT (OUTPATIENT)
Dept: SPEECH THERAPY | Facility: CLINIC | Age: 40
End: 2025-07-31
Attending: INTERNAL MEDICINE
Payer: MEDICARE

## 2025-07-31 ENCOUNTER — APPOINTMENT (OUTPATIENT)
Facility: CLINIC | Age: 40
End: 2025-07-31
Attending: INTERNAL MEDICINE
Payer: MEDICARE

## 2025-08-05 ENCOUNTER — OFFICE VISIT (OUTPATIENT)
Dept: SPEECH THERAPY | Facility: CLINIC | Age: 40
End: 2025-08-05
Attending: INTERNAL MEDICINE
Payer: MEDICARE

## 2025-08-05 DIAGNOSIS — I63.9 CEREBROVASCULAR ACCIDENT (CVA), UNSPECIFIED MECHANISM (HCC): ICD-10-CM

## 2025-08-05 DIAGNOSIS — R41.841 COGNITIVE COMMUNICATION DEFICIT: Primary | ICD-10-CM

## 2025-08-05 PROCEDURE — 92507 TX SP LANG VOICE COMM INDIV: CPT

## 2025-08-07 ENCOUNTER — EVALUATION (OUTPATIENT)
Dept: SPEECH THERAPY | Facility: CLINIC | Age: 40
End: 2025-08-07
Attending: INTERNAL MEDICINE
Payer: MEDICARE

## 2025-08-07 DIAGNOSIS — R41.841 COGNITIVE COMMUNICATION DEFICIT: Primary | ICD-10-CM

## 2025-08-07 DIAGNOSIS — I63.9 CEREBROVASCULAR ACCIDENT (CVA), UNSPECIFIED MECHANISM (HCC): ICD-10-CM

## 2025-08-07 PROCEDURE — 96125 COGNITIVE TEST BY HC PRO: CPT

## 2025-08-11 ENCOUNTER — OFFICE VISIT (OUTPATIENT)
Age: 40
End: 2025-08-11
Payer: MEDICARE

## 2025-08-12 ENCOUNTER — PROCEDURE VISIT (OUTPATIENT)
Dept: PODIATRY | Facility: CLINIC | Age: 40
End: 2025-08-12
Payer: MEDICARE

## 2025-08-12 ENCOUNTER — OFFICE VISIT (OUTPATIENT)
Dept: SPEECH THERAPY | Facility: CLINIC | Age: 40
End: 2025-08-12
Attending: INTERNAL MEDICINE
Payer: MEDICARE

## 2025-08-12 PROBLEM — B35.1 ONYCHOMYCOSIS: Status: ACTIVE | Noted: 2025-08-12

## 2025-08-14 ENCOUNTER — TELEPHONE (OUTPATIENT)
Dept: PSYCHIATRY | Facility: CLINIC | Age: 40
End: 2025-08-14

## 2025-08-14 ENCOUNTER — TELEMEDICINE (OUTPATIENT)
Dept: PSYCHIATRY | Facility: CLINIC | Age: 40
End: 2025-08-14
Payer: MEDICARE

## 2025-08-14 ENCOUNTER — APPOINTMENT (OUTPATIENT)
Dept: RADIOLOGY | Facility: AMBULARY SURGERY CENTER | Age: 40
End: 2025-08-14
Attending: SURGERY
Payer: MEDICARE

## 2025-08-14 ENCOUNTER — OFFICE VISIT (OUTPATIENT)
Dept: DERMATOLOGY | Facility: CLINIC | Age: 40
End: 2025-08-14
Payer: MEDICARE

## 2025-08-14 PROBLEM — F33.1 MODERATE EPISODE OF RECURRENT MAJOR DEPRESSIVE DISORDER (HCC): Status: ACTIVE | Noted: 2021-04-15

## 2025-08-16 ENCOUNTER — APPOINTMENT (OUTPATIENT)
Dept: LAB | Facility: CLINIC | Age: 40
End: 2025-08-16
Payer: MEDICARE

## 2025-08-16 DIAGNOSIS — E10.22 TYPE 1 DIABETES MELLITUS WITH END-STAGE RENAL DISEASE (ESRD) (HCC): ICD-10-CM

## 2025-08-16 DIAGNOSIS — Z01.818 OTHER SPECIFIED PRE-OPERATIVE EXAMINATION: ICD-10-CM

## 2025-08-16 DIAGNOSIS — N18.6 TYPE 1 DIABETES MELLITUS WITH END-STAGE RENAL DISEASE (ESRD) (HCC): ICD-10-CM

## 2025-08-16 DIAGNOSIS — Z99.2 ENCOUNTER FOR EXTRACORPOREAL DIALYSIS (HCC): ICD-10-CM

## 2025-08-16 DIAGNOSIS — F41.1 GENERALIZED ANXIETY DISORDER: Chronic | ICD-10-CM

## 2025-08-16 DIAGNOSIS — F51.04 PSYCHOPHYSIOLOGICAL INSOMNIA: ICD-10-CM

## 2025-08-16 DIAGNOSIS — I63.9 IMPENDING CEREBROVASCULAR ACCIDENT (HCC): ICD-10-CM

## 2025-08-16 DIAGNOSIS — N18.6 END STAGE RENAL DISEASE (HCC): ICD-10-CM

## 2025-08-16 LAB
ALBUMIN SERPL BCG-MCNC: 4.5 G/DL (ref 3.5–5)
ALP SERPL-CCNC: 71 U/L (ref 34–104)
ALT SERPL W P-5'-P-CCNC: 12 U/L (ref 7–52)
ANION GAP SERPL CALCULATED.3IONS-SCNC: 10 MMOL/L (ref 4–13)
APTT PPP: 30 SECONDS (ref 23–34)
AST SERPL W P-5'-P-CCNC: 17 U/L (ref 13–39)
BASOPHILS # BLD AUTO: 0.09 THOUSANDS/ÂΜL (ref 0–0.1)
BASOPHILS NFR BLD AUTO: 1 % (ref 0–1)
BILIRUB SERPL-MCNC: 0.52 MG/DL (ref 0.2–1)
BUN SERPL-MCNC: 25 MG/DL (ref 5–25)
CALCIUM SERPL-MCNC: 10.4 MG/DL (ref 8.4–10.2)
CHLORIDE SERPL-SCNC: 94 MMOL/L (ref 96–108)
CO2 SERPL-SCNC: 35 MMOL/L (ref 21–32)
CREAT SERPL-MCNC: 7.87 MG/DL (ref 0.6–1.3)
EOSINOPHIL # BLD AUTO: 0.36 THOUSAND/ÂΜL (ref 0–0.61)
EOSINOPHIL NFR BLD AUTO: 5 % (ref 0–6)
ERYTHROCYTE [DISTWIDTH] IN BLOOD BY AUTOMATED COUNT: 13.7 % (ref 11.6–15.1)
EST. AVERAGE GLUCOSE BLD GHB EST-MCNC: 111 MG/DL
GFR SERPL CREATININE-BSD FRML MDRD: 7 ML/MIN/1.73SQ M
GLUCOSE P FAST SERPL-MCNC: 147 MG/DL (ref 65–99)
HAV AB SER QL IA: NORMAL
HBA1C MFR BLD: 5.5 %
HBV CORE AB SER QL: NORMAL
HBV SURFACE AG SER QL: NORMAL
HCT VFR BLD AUTO: 40.6 % (ref 36.5–49.3)
HGB BLD-MCNC: 13 G/DL (ref 12–17)
HIV 1+2 AB+HIV1 P24 AG SERPL QL IA: NORMAL
IMM GRANULOCYTES # BLD AUTO: 0.02 THOUSAND/UL (ref 0–0.2)
IMM GRANULOCYTES NFR BLD AUTO: 0 % (ref 0–2)
INR PPP: 0.85 (ref 0.85–1.19)
LYMPHOCYTES # BLD AUTO: 1.74 THOUSANDS/ÂΜL (ref 0.6–4.47)
LYMPHOCYTES NFR BLD AUTO: 24 % (ref 14–44)
MCH RBC QN AUTO: 32.8 PG (ref 26.8–34.3)
MCHC RBC AUTO-ENTMCNC: 32 G/DL (ref 31.4–37.4)
MCV RBC AUTO: 103 FL (ref 82–98)
MONOCYTES # BLD AUTO: 0.52 THOUSAND/ÂΜL (ref 0.17–1.22)
MONOCYTES NFR BLD AUTO: 7 % (ref 4–12)
NEUTROPHILS # BLD AUTO: 4.54 THOUSANDS/ÂΜL (ref 1.85–7.62)
NEUTS SEG NFR BLD AUTO: 63 % (ref 43–75)
NRBC BLD AUTO-RTO: 0 /100 WBCS
PLATELET # BLD AUTO: 292 THOUSANDS/UL (ref 149–390)
PMV BLD AUTO: 10.4 FL (ref 8.9–12.7)
POTASSIUM SERPL-SCNC: 4.8 MMOL/L (ref 3.5–5.3)
PROT SERPL-MCNC: 7.2 G/DL (ref 6.4–8.4)
PROTHROMBIN TIME: 12.3 SECONDS (ref 12.3–15)
RBC # BLD AUTO: 3.96 MILLION/UL (ref 3.88–5.62)
SODIUM SERPL-SCNC: 139 MMOL/L (ref 135–147)
TREPONEMA PALLIDUM IGG+IGM AB [PRESENCE] IN SERUM OR PLASMA BY IMMUNOASSAY: NORMAL
VZV IGG SER QL IA: 25.1 S/CO
VZV IGG SER QL IA: POSITIVE
WBC # BLD AUTO: 7.27 THOUSAND/UL (ref 4.31–10.16)

## 2025-08-16 PROCEDURE — 86787 VARICELLA-ZOSTER ANTIBODY: CPT

## 2025-08-16 PROCEDURE — 36415 COLL VENOUS BLD VENIPUNCTURE: CPT

## 2025-08-16 PROCEDURE — 85610 PROTHROMBIN TIME: CPT

## 2025-08-16 PROCEDURE — 86665 EPSTEIN-BARR CAPSID VCA: CPT

## 2025-08-16 PROCEDURE — 86480 TB TEST CELL IMMUN MEASURE: CPT

## 2025-08-16 PROCEDURE — 86765 RUBEOLA ANTIBODY: CPT

## 2025-08-16 PROCEDURE — 83036 HEMOGLOBIN GLYCOSYLATED A1C: CPT

## 2025-08-16 PROCEDURE — 86695 HERPES SIMPLEX TYPE 1 TEST: CPT

## 2025-08-16 PROCEDURE — 86645 CMV ANTIBODY IGM: CPT

## 2025-08-16 PROCEDURE — 87340 HEPATITIS B SURFACE AG IA: CPT

## 2025-08-16 PROCEDURE — 86696 HERPES SIMPLEX TYPE 2 TEST: CPT

## 2025-08-16 PROCEDURE — 86682 HELMINTH ANTIBODY: CPT

## 2025-08-16 PROCEDURE — 86704 HEP B CORE ANTIBODY TOTAL: CPT

## 2025-08-16 PROCEDURE — 86780 TREPONEMA PALLIDUM: CPT

## 2025-08-16 PROCEDURE — 86762 RUBELLA ANTIBODY: CPT

## 2025-08-16 PROCEDURE — 86663 EPSTEIN-BARR ANTIBODY: CPT

## 2025-08-16 PROCEDURE — 86803 HEPATITIS C AB TEST: CPT

## 2025-08-16 PROCEDURE — 86644 CMV ANTIBODY: CPT

## 2025-08-16 PROCEDURE — 87389 HIV-1 AG W/HIV-1&-2 AB AG IA: CPT

## 2025-08-16 PROCEDURE — 86708 HEPATITIS A ANTIBODY: CPT

## 2025-08-16 PROCEDURE — 86735 MUMPS ANTIBODY: CPT

## 2025-08-16 PROCEDURE — 87517 HEPATITIS B DNA QUANT: CPT

## 2025-08-16 PROCEDURE — 85730 THROMBOPLASTIN TIME PARTIAL: CPT

## 2025-08-16 PROCEDURE — 85025 COMPLETE CBC W/AUTO DIFF WBC: CPT

## 2025-08-16 PROCEDURE — 86664 EPSTEIN-BARR NUCLEAR ANTIGEN: CPT

## 2025-08-16 PROCEDURE — 80053 COMPREHEN METABOLIC PANEL: CPT

## 2025-08-17 LAB
GAMMA INTERFERON BACKGROUND BLD IA-ACNC: 0.04 IU/ML
HCV AB S/CO SERPL IA: NON REACTIVE
M TB IFN-G BLD-IMP: NEGATIVE
M TB IFN-G CD4+ BCKGRND COR BLD-ACNC: 0 IU/ML
M TB IFN-G CD4+ BCKGRND COR BLD-ACNC: 0 IU/ML
MITOGEN IGNF BCKGRD COR BLD-ACNC: 9.96 IU/ML
SL AMB INTERPRETATION: NORMAL

## 2025-08-18 ENCOUNTER — TELEPHONE (OUTPATIENT)
Dept: NEUROLOGY | Facility: CLINIC | Age: 40
End: 2025-08-18

## 2025-08-18 LAB
CMV IGG SERPL QL IA: POSITIVE
CMV IGM SERPL QL IA: NEGATIVE
EBV EA IGG SER QL IA: NEGATIVE
EBV NA IGG SER QL IA: POSITIVE
EBV VCA IGG SER QL IA: POSITIVE
EBV VCA IGM SER QL IA: NEGATIVE
HBV DNA SERPL NAA+PROBE-ACNC: NOT DETECTED [IU]/ML
HSV2 IGG SERPL QL IA: NEGATIVE
HSV2 IGG SERPL QL IA: NEGATIVE
MEV IGG SER IA-ACNC: 80.2 AU/ML
MUV IGG SER IA-ACNC: 67 AU/ML
RUBV IGG SERPL IA-ACNC: 11.3 INDEX
STRONGYLOIDES IGG SER QL IA: NEGATIVE

## 2025-08-18 RX ORDER — TRAZODONE HYDROCHLORIDE 50 MG/1
50 TABLET ORAL
Qty: 30 TABLET | Refills: 1 | Status: SHIPPED | OUTPATIENT
Start: 2025-08-18

## 2025-08-21 ENCOUNTER — TELEPHONE (OUTPATIENT)
Age: 40
End: 2025-08-21

## 2025-08-21 DIAGNOSIS — E55.9 VITAMIN D DEFICIENCY: ICD-10-CM

## 2025-08-21 DIAGNOSIS — H53.30 BINOCULAR VISUAL DISTURBANCE: ICD-10-CM

## 2025-08-21 DIAGNOSIS — R11.0 NAUSEA: ICD-10-CM

## 2025-08-21 DIAGNOSIS — G35 OPTIC NEURITIS DUE TO MULTIPLE SCLEROSIS (HCC): ICD-10-CM

## 2025-08-21 DIAGNOSIS — E10.21 TYPE 1 DIABETES MELLITUS WITH DIABETIC NEPHROPATHY, WITH LONG-TERM CURRENT USE OF INSULIN (HCC): Chronic | ICD-10-CM

## 2025-08-21 DIAGNOSIS — I16.0 HYPERTENSIVE URGENCY: ICD-10-CM

## 2025-08-21 DIAGNOSIS — H51.0 BINOCULAR VISION DISORDER WITH CONJUGATE GAZE PALSY: ICD-10-CM

## 2025-08-21 DIAGNOSIS — H53.8 BLURRED VISION: ICD-10-CM

## 2025-08-21 DIAGNOSIS — E72.11 HYPERHOMOCYSTEINEMIA (HCC): ICD-10-CM

## 2025-08-21 DIAGNOSIS — I63.9 CEREBROVASCULAR ACCIDENT (CVA) OF LEFT PONTINE STRUCTURE (HCC): ICD-10-CM

## 2025-08-21 DIAGNOSIS — N18.30 ACUTE RENAL FAILURE WITH ACUTE TUBULAR NECROSIS SUPERIMPOSED ON STAGE 3 CHRONIC KIDNEY DISEASE (HCC): ICD-10-CM

## 2025-08-21 DIAGNOSIS — I63.9 CEREBROVASCULAR ACCIDENT (CVA), UNSPECIFIED MECHANISM (HCC): ICD-10-CM

## 2025-08-21 DIAGNOSIS — N18.2: ICD-10-CM

## 2025-08-21 DIAGNOSIS — E83.39 HYPERPHOSPHATEMIA: ICD-10-CM

## 2025-08-21 DIAGNOSIS — N17.0: ICD-10-CM

## 2025-08-21 DIAGNOSIS — N17.0 ACUTE RENAL FAILURE WITH ACUTE TUBULAR NECROSIS SUPERIMPOSED ON STAGE 3 CHRONIC KIDNEY DISEASE (HCC): ICD-10-CM

## 2025-08-21 DIAGNOSIS — R79.89 AZOTEMIA: ICD-10-CM

## 2025-08-21 DIAGNOSIS — Z86.73 HISTORY OF LACUNAR CEREBROVASCULAR ACCIDENT (CVA): ICD-10-CM

## 2025-08-21 DIAGNOSIS — H46.9 OPTIC NEURITIS DUE TO MULTIPLE SCLEROSIS (HCC): ICD-10-CM

## 2025-08-21 DIAGNOSIS — R80.1 PERSISTENT PROTEINURIA: ICD-10-CM

## 2025-08-21 RX ORDER — ATORVASTATIN CALCIUM 40 MG/1
40 TABLET, FILM COATED ORAL EVERY EVENING
Qty: 100 TABLET | Refills: 1 | Status: SHIPPED | OUTPATIENT
Start: 2025-08-21

## (undated) DEVICE — SUT MONOCRYL 4-0 PS-2 27 IN Y426H

## (undated) DEVICE — CHLORAPREP HI-LITE 26ML ORANGE

## (undated) DEVICE — ENDOPATH XCEL BLADELESS TROCARS WITH STABILITY SLEEVES: Brand: ENDOPATH XCEL

## (undated) DEVICE — IV CATH INTROCAN 18G X 1 1/4 SAFETY

## (undated) DEVICE — SUT PROLENE 6-0 BV130 30 IN 8709H

## (undated) DEVICE — NEEDLE 25G X 1 1/2

## (undated) DEVICE — THIS DEVICE IS A DOUBLE SEALING MALE LUER LOCK INTENDED FOR USE WITH THE BAXTER LOCKING TITANIUM ADAPTER FOR PERITONEAL DIALYSIS.: Brand: LOCKING CAP FOR PERITONEAL DIALYSIS CATHETER ADAPTER

## (undated) DEVICE — 2000CC GUARDIAN II: Brand: GUARDIAN

## (undated) DEVICE — SUT MONOCRYL 3-0 SH 27 IN Y416H

## (undated) DEVICE — PAD GROUNDING ADULT

## (undated) DEVICE — LIGHT HANDLE COVER SLEEVE DISP BLUE STELLAR

## (undated) DEVICE — INTENDED FOR TISSUE SEPARATION, AND OTHER PROCEDURES THAT REQUIRE A SHARP SURGICAL BLADE TO PUNCTURE OR CUT.: Brand: BARD-PARKER SAFETY BLADES SIZE 15, STERILE

## (undated) DEVICE — 3M™ TEGADERM™ TRANSPARENT FILM DRESSING FRAME STYLE, 1626W, 4 IN X 4-3/4 IN (10 CM X 12 CM), 50/CT 4CT/CASE: Brand: 3M™ TEGADERM™

## (undated) DEVICE — NEEDLE SPINAL 25G X 3.5 IN QUINCKE

## (undated) DEVICE — GLOVE SRG BIOGEL ECLIPSE 7

## (undated) DEVICE — INTENDED FOR TISSUE SEPARATION, AND OTHER PROCEDURES THAT REQUIRE A SHARP SURGICAL BLADE TO PUNCTURE OR CUT.: Brand: BARD-PARKER ® CARBON RIB-BACK BLADES

## (undated) DEVICE — BETHLEHEM UNIVERSAL MINOR GEN: Brand: CARDINAL HEALTH

## (undated) DEVICE — SCD SEQUENTIAL COMPRESSION COMFORT SLEEVE MEDIUM KNEE LENGTH: Brand: KENDALL SCD

## (undated) DEVICE — PETRI DISH STERILE

## (undated) DEVICE — SUT VICRYL 3-0 SH 27 IN J416H

## (undated) DEVICE — SUT PROLENE 7-0 BV 175-6 24 IN 8735H

## (undated) DEVICE — SUT SILK 4-0 30 IN A303H

## (undated) DEVICE — NEEDLE 22 G X 1 1/2 SAFETY

## (undated) DEVICE — GLOVE SRG BIOGEL ORTHOPEDIC 8

## (undated) DEVICE — GLOVE SRG BIOGEL 8

## (undated) DEVICE — 3M™ TEGADERM™ TRANSPARENT FILM DRESSING FRAME STYLE, 1624W, 2-3/8 IN X 2-3/4 IN (6 CM X 7 CM), 100/CT 4CT/CASE: Brand: 3M™ TEGADERM™

## (undated) DEVICE — TIBURON SPLIT SHEET: Brand: CONVERTORS

## (undated) DEVICE — DRAPE SHEET X-LG

## (undated) DEVICE — GAUZE SPONGES,USP TYPE VII GAUZE, 12 PLY: Brand: CURITY

## (undated) DEVICE — VIAL DECANTER

## (undated) DEVICE — DRAPE EQUIPMENT RF WAND

## (undated) DEVICE — MONTCRIEF CATH TITANIUM ADAPTOR

## (undated) DEVICE — ALLENTOWN LAP CHOLE APP PACK: Brand: CARDINAL HEALTH

## (undated) DEVICE — 3M™ TEGADERM™ TRANSPARENT FILM DRESSING FRAME STYLE, 1627, 4 IN X 10 IN (10 CM X 25 CM), 20/CT 4CT/CASE: Brand: 3M™ TEGADERM™

## (undated) DEVICE — LIGACLIP MCA MULTIPLE CLIP APPLIERS, 20 SMALL CLIPS: Brand: LIGACLIP

## (undated) DEVICE — PLUMEPEN PRO 10FT

## (undated) DEVICE — SYRINGE 1ML TB 25G X 5/8 NON SAFETY

## (undated) DEVICE — SYRINGE 20ML LL

## (undated) DEVICE — ADHESIVE SKIN HIGH VISCOSITY EXOFIN 1ML

## (undated) DEVICE — CYSTO TUBING SINGLE IRRIGATION

## (undated) DEVICE — ENDOPATH XCEL UNIVERSAL TROCAR STABLILITY SLEEVES: Brand: ENDOPATH XCEL

## (undated) DEVICE — SUT SILK 3-0 30 IN A304H

## (undated) DEVICE — INTENDED FOR TISSUE SEPARATION, AND OTHER PROCEDURES THAT REQUIRE A SHARP SURGICAL BLADE TO PUNCTURE OR CUT.: Brand: BARD-PARKER SAFETY BLADES SIZE 11, STERILE

## (undated) DEVICE — GLOVE SRG BIOGEL 7.5

## (undated) DEVICE — ELECTRODE BLADE MOD E-Z CLEAN  2.75IN 7CM -0012AM

## (undated) DEVICE — GAUZE SPONGES,16 PLY: Brand: CURITY

## (undated) DEVICE — TOWEL SURG XR DETECT GREEN STRL RFD

## (undated) DEVICE — BAG DECANTER

## (undated) DEVICE — SYRINGE 5ML LL

## (undated) DEVICE — SURGICEL FIBRILLAR 1 X 2

## (undated) DEVICE — PERITONEAL DIALYSIS ACCESSORY,BETA-CAP CLAMP: Brand: ARGYLE

## (undated) DEVICE — SUT SILK 2-0 30 IN A305H

## (undated) DEVICE — LIGACLIP MCA MULTIPLE CLIP APPLIERS, 20 MEDIUM CLIPS: Brand: LIGACLIP

## (undated) DEVICE — SUT MONOCRYL 4-0 PS-2 18 IN Y496G

## (undated) DEVICE — SPECIMEN CONTAINER STERILE PEEL PACK

## (undated) DEVICE — REM POLYHESIVE ADULT PATIENT RETURN ELECTRODE: Brand: VALLEYLAB

## (undated) DEVICE — SUT VICRYL 0 UR-6 27 IN J603H

## (undated) DEVICE — ADHESIVE SKN CLSR HISTOACRYL FLEX 0.5ML LF

## (undated) DEVICE — STRL BETHLEHEM A V FISTULA PK: Brand: CARDINAL HEALTH